# Patient Record
Sex: FEMALE | Race: WHITE | NOT HISPANIC OR LATINO | Employment: OTHER | URBAN - METROPOLITAN AREA
[De-identification: names, ages, dates, MRNs, and addresses within clinical notes are randomized per-mention and may not be internally consistent; named-entity substitution may affect disease eponyms.]

---

## 2017-01-15 ENCOUNTER — HOSPITAL ENCOUNTER (INPATIENT)
Facility: HOSPITAL | Age: 52
LOS: 11 days | Discharge: OTHER INSTITUTION | DRG: 442 | End: 2017-01-26
Attending: EMERGENCY MEDICINE | Admitting: HOSPITALIST
Payer: MEDICARE

## 2017-01-15 DIAGNOSIS — R56.9 SEIZURE IN RESPONSE TO ACUTE EVENT: ICD-10-CM

## 2017-01-15 DIAGNOSIS — F31.70 BIPOLAR DISORDER IN REMISSION: ICD-10-CM

## 2017-01-15 DIAGNOSIS — F10.21 ALCOHOL DEPENDENCE IN REMISSION: ICD-10-CM

## 2017-01-15 DIAGNOSIS — K92.0 HEMATEMESIS: ICD-10-CM

## 2017-01-15 DIAGNOSIS — E46 MALNUTRITION: ICD-10-CM

## 2017-01-15 DIAGNOSIS — F13.932: ICD-10-CM

## 2017-01-15 DIAGNOSIS — K76.82 HEPATIC ENCEPHALOPATHY: Primary | ICD-10-CM

## 2017-01-15 DIAGNOSIS — F10.90 ALCOHOL USE DISORDER: ICD-10-CM

## 2017-01-15 LAB
ALBUMIN SERPL BCP-MCNC: 3.9 G/DL
ALP SERPL-CCNC: 115 U/L
ALT SERPL W/O P-5'-P-CCNC: 40 U/L
AMMONIA PLAS-SCNC: 50 UMOL/L
AMPHET+METHAMPHET UR QL: NEGATIVE
ANION GAP SERPL CALC-SCNC: 8 MMOL/L
AST SERPL-CCNC: 64 U/L
BARBITURATES UR QL SCN>200 NG/ML: NEGATIVE
BASOPHILS # BLD AUTO: 0.04 K/UL
BASOPHILS NFR BLD: 0.3 %
BENZODIAZ UR QL SCN>200 NG/ML: NORMAL
BILIRUB SERPL-MCNC: 14.1 MG/DL
BILIRUB UR QL STRIP: ABNORMAL
BUN SERPL-MCNC: 16 MG/DL
BZE UR QL SCN: NEGATIVE
CALCIUM SERPL-MCNC: 9.9 MG/DL
CANNABINOIDS UR QL SCN: NEGATIVE
CHLORIDE SERPL-SCNC: 107 MMOL/L
CLARITY UR REFRACT.AUTO: CLEAR
CO2 SERPL-SCNC: 24 MMOL/L
COLOR UR AUTO: ABNORMAL
CREAT SERPL-MCNC: 0.7 MG/DL
CREAT UR-MCNC: 192 MG/DL
DIFFERENTIAL METHOD: ABNORMAL
EOSINOPHIL # BLD AUTO: 0.2 K/UL
EOSINOPHIL NFR BLD: 1.2 %
ERYTHROCYTE [DISTWIDTH] IN BLOOD BY AUTOMATED COUNT: 16 %
EST. GFR  (AFRICAN AMERICAN): >60 ML/MIN/1.73 M^2
EST. GFR  (NON AFRICAN AMERICAN): >60 ML/MIN/1.73 M^2
ETHANOL SERPL-MCNC: <10 MG/DL
ETHANOL UR-MCNC: <10 MG/DL
GLUCOSE SERPL-MCNC: 107 MG/DL
GLUCOSE UR QL STRIP: NEGATIVE
HCT VFR BLD AUTO: 31.4 %
HGB BLD-MCNC: 10.7 G/DL
HGB UR QL STRIP: NEGATIVE
INR PPP: 1.8
KETONES UR QL STRIP: ABNORMAL
LACTATE SERPL-SCNC: 2.3 MMOL/L
LEUKOCYTE ESTERASE UR QL STRIP: NEGATIVE
LITHIUM SERPL-SCNC: 0.7 MMOL/L
LYMPHOCYTES # BLD AUTO: 1.5 K/UL
LYMPHOCYTES NFR BLD: 12.5 %
MCH RBC QN AUTO: 32 PG
MCHC RBC AUTO-ENTMCNC: 34.1 %
MCV RBC AUTO: 94 FL
METHADONE UR QL SCN>300 NG/ML: NEGATIVE
MONOCYTES # BLD AUTO: 0.7 K/UL
MONOCYTES NFR BLD: 5.8 %
NEUTROPHILS # BLD AUTO: 9.8 K/UL
NEUTROPHILS NFR BLD: 79.6 %
NITRITE UR QL STRIP: NEGATIVE
OPIATES UR QL SCN: NEGATIVE
PCP UR QL SCN>25 NG/ML: NEGATIVE
PH UR STRIP: 6 [PH] (ref 5–8)
PLATELET # BLD AUTO: 70 K/UL
PMV BLD AUTO: 9.9 FL
POTASSIUM SERPL-SCNC: 3.6 MMOL/L
PROT SERPL-MCNC: 7.9 G/DL
PROT UR QL STRIP: ABNORMAL
PROTHROMBIN TIME: 17.7 SEC
RBC # BLD AUTO: 3.34 M/UL
SODIUM SERPL-SCNC: 139 MMOL/L
SP GR UR STRIP: 1.02 (ref 1–1.03)
TOXICOLOGY INFORMATION: NORMAL
URN SPEC COLLECT METH UR: ABNORMAL
UROBILINOGEN UR STRIP-ACNC: 2 EU/DL
WBC # BLD AUTO: 12.34 K/UL

## 2017-01-15 PROCEDURE — 82140 ASSAY OF AMMONIA: CPT

## 2017-01-15 PROCEDURE — 80178 ASSAY OF LITHIUM: CPT

## 2017-01-15 PROCEDURE — 25000003 PHARM REV CODE 250: Performed by: STUDENT IN AN ORGANIZED HEALTH CARE EDUCATION/TRAINING PROGRAM

## 2017-01-15 PROCEDURE — 85610 PROTHROMBIN TIME: CPT

## 2017-01-15 PROCEDURE — 93010 ELECTROCARDIOGRAM REPORT: CPT | Mod: ,,, | Performed by: INTERNAL MEDICINE

## 2017-01-15 PROCEDURE — 63600175 PHARM REV CODE 636 W HCPCS: Performed by: STUDENT IN AN ORGANIZED HEALTH CARE EDUCATION/TRAINING PROGRAM

## 2017-01-15 PROCEDURE — 80307 DRUG TEST PRSMV CHEM ANLYZR: CPT

## 2017-01-15 PROCEDURE — 11000001 HC ACUTE MED/SURG PRIVATE ROOM

## 2017-01-15 PROCEDURE — 96360 HYDRATION IV INFUSION INIT: CPT

## 2017-01-15 PROCEDURE — 85025 COMPLETE CBC W/AUTO DIFF WBC: CPT

## 2017-01-15 PROCEDURE — 99285 EMERGENCY DEPT VISIT HI MDM: CPT | Mod: ,,, | Performed by: EMERGENCY MEDICINE

## 2017-01-15 PROCEDURE — 25000003 PHARM REV CODE 250: Performed by: EMERGENCY MEDICINE

## 2017-01-15 PROCEDURE — 83605 ASSAY OF LACTIC ACID: CPT

## 2017-01-15 PROCEDURE — 96361 HYDRATE IV INFUSION ADD-ON: CPT

## 2017-01-15 PROCEDURE — 80053 COMPREHEN METABOLIC PANEL: CPT

## 2017-01-15 PROCEDURE — 80184 ASSAY OF PHENOBARBITAL: CPT

## 2017-01-15 PROCEDURE — 81003 URINALYSIS AUTO W/O SCOPE: CPT

## 2017-01-15 PROCEDURE — 99285 EMERGENCY DEPT VISIT HI MDM: CPT | Mod: 25

## 2017-01-15 PROCEDURE — 93005 ELECTROCARDIOGRAM TRACING: CPT

## 2017-01-15 PROCEDURE — 87086 URINE CULTURE/COLONY COUNT: CPT

## 2017-01-15 PROCEDURE — 87040 BLOOD CULTURE FOR BACTERIA: CPT

## 2017-01-15 PROCEDURE — 80320 DRUG SCREEN QUANTALCOHOLS: CPT

## 2017-01-15 PROCEDURE — 99223 1ST HOSP IP/OBS HIGH 75: CPT | Mod: ,,, | Performed by: HOSPITALIST

## 2017-01-15 RX ORDER — IBUPROFEN 200 MG
24 TABLET ORAL
Status: DISCONTINUED | OUTPATIENT
Start: 2017-01-15 | End: 2017-01-26 | Stop reason: HOSPADM

## 2017-01-15 RX ORDER — ONDANSETRON 8 MG/1
8 TABLET, ORALLY DISINTEGRATING ORAL EVERY 8 HOURS PRN
Status: DISCONTINUED | OUTPATIENT
Start: 2017-01-15 | End: 2017-01-21

## 2017-01-15 RX ORDER — PANTOPRAZOLE SODIUM 40 MG/1
40 TABLET, DELAYED RELEASE ORAL DAILY
Status: DISCONTINUED | OUTPATIENT
Start: 2017-01-16 | End: 2017-01-21

## 2017-01-15 RX ORDER — IBUPROFEN 200 MG
16 TABLET ORAL
Status: DISCONTINUED | OUTPATIENT
Start: 2017-01-15 | End: 2017-01-26 | Stop reason: HOSPADM

## 2017-01-15 RX ORDER — FOLIC ACID 1 MG/1
1000 TABLET ORAL DAILY
Status: DISCONTINUED | OUTPATIENT
Start: 2017-01-16 | End: 2017-01-26 | Stop reason: HOSPADM

## 2017-01-15 RX ORDER — LEVETIRACETAM 500 MG/1
500 TABLET ORAL 2 TIMES DAILY
Status: DISCONTINUED | OUTPATIENT
Start: 2017-01-15 | End: 2017-01-17

## 2017-01-15 RX ORDER — FUROSEMIDE 20 MG/1
20 TABLET ORAL DAILY
Status: DISCONTINUED | OUTPATIENT
Start: 2017-01-16 | End: 2017-01-15

## 2017-01-15 RX ORDER — GLUCAGON 1 MG
1 KIT INJECTION
Status: DISCONTINUED | OUTPATIENT
Start: 2017-01-15 | End: 2017-01-26 | Stop reason: HOSPADM

## 2017-01-15 RX ORDER — SPIRONOLACTONE 25 MG/1
50 TABLET ORAL DAILY
Status: DISCONTINUED | OUTPATIENT
Start: 2017-01-16 | End: 2017-01-15

## 2017-01-15 RX ORDER — ENOXAPARIN SODIUM 100 MG/ML
30 INJECTION SUBCUTANEOUS EVERY 24 HOURS
Status: DISCONTINUED | OUTPATIENT
Start: 2017-01-16 | End: 2017-01-16

## 2017-01-15 RX ORDER — ENOXAPARIN SODIUM 100 MG/ML
40 INJECTION SUBCUTANEOUS EVERY 24 HOURS
Status: DISCONTINUED | OUTPATIENT
Start: 2017-01-16 | End: 2017-01-15

## 2017-01-15 RX ORDER — OLANZAPINE 10 MG/2ML
2.5 INJECTION, POWDER, FOR SOLUTION INTRAMUSCULAR ONCE AS NEEDED
Status: DISPENSED | OUTPATIENT
Start: 2017-01-15 | End: 2017-01-15

## 2017-01-15 RX ORDER — LACTULOSE 10 G/15ML
20 SOLUTION ORAL 3 TIMES DAILY
Status: DISCONTINUED | OUTPATIENT
Start: 2017-01-15 | End: 2017-01-16

## 2017-01-15 RX ORDER — LITHIUM CARBONATE 300 MG/1
300 CAPSULE ORAL DAILY
Status: DISCONTINUED | OUTPATIENT
Start: 2017-01-16 | End: 2017-01-20

## 2017-01-15 RX ORDER — LACTULOSE 10 G/15ML
20 SOLUTION ORAL ONCE
Status: COMPLETED | OUTPATIENT
Start: 2017-01-15 | End: 2017-01-15

## 2017-01-15 RX ADMIN — LEVETIRACETAM 500 MG: 500 TABLET, FILM COATED ORAL at 10:01

## 2017-01-15 RX ADMIN — CEFTRIAXONE 1 G: 1 INJECTION, SOLUTION INTRAVENOUS at 11:01

## 2017-01-15 RX ADMIN — LACTULOSE 20 G: 10 SOLUTION ORAL at 02:01

## 2017-01-15 RX ADMIN — SODIUM CHLORIDE 1000 ML: 0.9 INJECTION, SOLUTION INTRAVENOUS at 02:01

## 2017-01-15 RX ADMIN — LACTULOSE 20 G: 10 SOLUTION ORAL at 10:01

## 2017-01-15 NOTE — ED NOTES
Patient identifiers verified and correct for Marely Cardosoelle     C/C: AMS  APPEARANCE: awake.  SKIN: warm, dry and intact. No breakdown or bruising. Jaundice noted.  MUSCULOSKELETAL: Patient moving all extremities spontaneously, no obvious swelling or deformities noted. Ambulates with assistance.   RESPIRATORY: no shortness of breath. Productive cough noted.   CARDIAC: heart tones normal. Regular rate and rhythm; 2+ distal pulses; no peripheral edema  ABDOMEN: S/ND/NT, normoactive bowel sounds present in all four quadrants. Normal stool pattern.  : voids spontaneously without difficulty.  Neurologic: AAO x 4; delayed responses. follows commands equal strength in all extremities; denies numbness/tingling.

## 2017-01-15 NOTE — ED PROVIDER NOTES
Encounter Date: 1/15/2017       History     Chief Complaint   Patient presents with    Altered Mental Status     since last sunday; patient jaundiced; hx liver problems     Review of patient's allergies indicates:   Allergen Reactions    Sulfa (sulfonamide antibiotics) Rash     HPI Comments: 51 y.o. F w end stage liver disease, bipolar disorder, presenting for AMS for 1 week. The patient was previously being evaluated for liver transplant here but after seeing a [facility] doctor who told her that transplant was not needed, she was lost to f/u at [facility]. This is according to [facility] chart review. Patient's mother states that she was rejected for transplant and followed up at [facility] after this. The patient is alert and A&Ox3 but very slow to respond to questions. Sometimes does not respond at all. She denies having specific complaints. She is not eating much (per mom) but has baseline poor PO intake and was noted to be cachectic in prior notes. No fevers or vomiting. No abdominal pain. Patient states that she does take lactulose but does not take it every day. She takes lithium for bipolar according to her mom.      Past Medical History   Diagnosis Date    Alcohol abuse, in remission 6/15/2015     14.5 weeks ago; AA weekly    Anemia     Anxiety 6/15/2015    Behavioral problem     Bipolar disorder     Bipolar disorder in remission 6/15/2015    Cirrhosis, Laennec's 6/15/2015    Depression     Encounter for blood transfusion     Epistaxis 6/15/2015    Fatigue     Glaucoma     Hematuria     Hepatic encephalopathy 6/15/2015    Hepatic enlargement     History of psychiatric care     History of psychiatric hospitalization     History of seizure 6/15/2015     1    hx of intentional Tylenol overdose 6/15/2015     2005- situational and hx of bipolar    Jeana     Osteoarthritis     Other ascites 6/15/2015    Psychiatric problem     Renal disorder     Seizures     Self-harming behavior     Suicide  attempt     Therapy     Thrombocytopenia 6/15/2015     Past Medical History Pertinent Negatives   Diagnosis Date Noted    ADHD (attention deficit hyperactivity disorder) 11/26/2014    CHF (congestive heart failure) 11/26/2014    COPD (chronic obstructive pulmonary disease) 11/26/2014    CVA (cerebral vascular accident) 11/26/2014    Dementia 11/26/2014    HIV infection 11/26/2014    Neuropathy 11/26/2014    Obsessive-compulsive disorder 11/26/2014    Oppositional defiant disorder 11/26/2014    Psychosis 11/26/2014    PTSD (post-traumatic stress disorder) 11/26/2014    Renal dialysis status(V45.11) 11/26/2014    Schizoaffective disorder 11/26/2014    Thyroid disease 11/26/2014     Past Surgical History   Procedure Laterality Date    Esophagogastroduodenoscopy      Cosmetic surgery       Family History   Problem Relation Age of Onset    Alcohol abuse Father     Suicide Father     Bipolar disorder Father     Alcohol abuse Sister     Hypertension Mother     Alcohol abuse Paternal Grandfather     Drug abuse Neg Hx     Dementia Neg Hx      Social History   Substance Use Topics    Smoking status: Never Smoker    Smokeless tobacco: None    Alcohol use No      Comment: pt denies at this time     Review of Systems   Unable to perform ROS: Mental status change       Physical Exam   Initial Vitals   BP Pulse Resp Temp SpO2   01/15/17 1153 01/15/17 1153 01/15/17 1153 01/15/17 1153 01/15/17 1153   187/82 100 15 98.7 °F (37.1 °C) 95 %     Physical Exam    Constitutional: She appears well-developed and well-nourished. She is not diaphoretic. No distress.   HENT:   Head: Normocephalic and atraumatic.   Eyes: Conjunctivae and EOM are normal. Pupils are equal, round, and reactive to light. Right eye exhibits no discharge. Left eye exhibits no discharge. Scleral icterus is present.   Neck: Normal range of motion. Neck supple.   Cardiovascular: Normal rate and normal heart sounds. Exam reveals no friction  rub.    No murmur heard.  Pulmonary/Chest: Breath sounds normal. No respiratory distress. She has no wheezes. She has no rales.   Abdominal: Soft. There is hepatosplenomegaly. There is no tenderness. There is no rebound and no guarding.   Musculoskeletal: She exhibits no edema or tenderness.   Lymphadenopathy:     She has no cervical adenopathy.   Neurological: She is alert and oriented to person, place, and time.   Skin: Skin is warm and dry. No erythema. No pallor.   jaundice         ED Course   Procedures  Labs Reviewed   CBC W/ AUTO DIFFERENTIAL   COMPREHENSIVE METABOLIC PANEL   AMMONIA   URINALYSIS   LITHIUM LEVEL                   APC / Resident Notes:   51 y.o. F w ESLD presenting w AMS. Differential is broad including hepatic encephalopathy (most likely), occult infection, renal failure, dehydration, intracranial pathology, ACS, dysrhythmia, urosepsis.  Patient found to have ammonia just at upper limit of normal. Bilirubin has continued to rise. RUQ ultrasound shows GB with thickened wall but likely chronic in etiology. She has been given 1 dose of PO lactulose and will be admitted to medicine for further w/u and treatment of her AMS.    Shea Meyer MD           Attending Attestation:   Physician Attestation Statement for Resident:  As the supervising MD . -: Patient with history of liver difficulties patient wife is concerned because the patient continues to be more lethargic despite taking lactulose patient is not on the liver transplant list  -: Will answer some questions but is quite lethargic no recent trauma with no contusions of head  -: Will do labs including ammonia level concern for infection GI bleed            Attending ED Notes:   Patient with history of hepatic complications presenting now his wife because of increased lethargy and confusion this is despite taking his lactulose patient evaluated in the ED was gi ammonia level patient required admission to the hospital because of continued  confusion          ED Course     Clinical Impression:   The encounter diagnosis was Hepatic encephalopathy.    Disposition:   Disposition: Admitted  Condition: Serious       Michelle Lucio MD  Resident  01/15/17 2046       Brady Deras MD  01/24/17 5825

## 2017-01-15 NOTE — ED TRIAGE NOTES
Pt arrived to the ED with CC of AMS. Pts mother states she noticed a decline in her daughters mental status a week ago. pts mother states she helps her with her medications but does not know if her daughter has been taking her lactulose.

## 2017-01-16 LAB
ALBUMIN SERPL BCP-MCNC: 3.3 G/DL
ALP SERPL-CCNC: 111 U/L
ALT SERPL W/O P-5'-P-CCNC: 34 U/L
ANION GAP SERPL CALC-SCNC: 6 MMOL/L
AST SERPL-CCNC: 50 U/L
BACTERIA UR CULT: NO GROWTH
BASOPHILS # BLD AUTO: 0.03 K/UL
BASOPHILS NFR BLD: 0.4 %
BILIRUB SERPL-MCNC: 11.7 MG/DL
BUN SERPL-MCNC: 12 MG/DL
CALCIUM SERPL-MCNC: 8.9 MG/DL
CHLORIDE SERPL-SCNC: 111 MMOL/L
CO2 SERPL-SCNC: 23 MMOL/L
CREAT SERPL-MCNC: 0.6 MG/DL
DIFFERENTIAL METHOD: ABNORMAL
EOSINOPHIL # BLD AUTO: 0.2 K/UL
EOSINOPHIL NFR BLD: 2.6 %
ERYTHROCYTE [DISTWIDTH] IN BLOOD BY AUTOMATED COUNT: 15.9 %
EST. GFR  (AFRICAN AMERICAN): >60 ML/MIN/1.73 M^2
EST. GFR  (NON AFRICAN AMERICAN): >60 ML/MIN/1.73 M^2
GLUCOSE SERPL-MCNC: 93 MG/DL
HAV IGM SERPL QL IA: NEGATIVE
HBV CORE IGM SERPL QL IA: NEGATIVE
HBV SURFACE AG SERPL QL IA: NEGATIVE
HCT VFR BLD AUTO: 28.9 %
HCV AB SERPL QL IA: NEGATIVE
HGB BLD-MCNC: 10 G/DL
HIV 1+2 AB+HIV1 P24 AG SERPL QL IA: NEGATIVE
INR PPP: 1.8
LYMPHOCYTES # BLD AUTO: 1.3 K/UL
LYMPHOCYTES NFR BLD: 16.1 %
MAGNESIUM SERPL-MCNC: 1.7 MG/DL
MCH RBC QN AUTO: 33.1 PG
MCHC RBC AUTO-ENTMCNC: 34.6 %
MCV RBC AUTO: 96 FL
MONOCYTES # BLD AUTO: 0.7 K/UL
MONOCYTES NFR BLD: 8 %
NEUTROPHILS # BLD AUTO: 5.9 K/UL
NEUTROPHILS NFR BLD: 72.5 %
PHENOBARB SERPL-MCNC: <2 UG/DL
PLATELET # BLD AUTO: 58 K/UL
PMV BLD AUTO: 8.9 FL
POTASSIUM SERPL-SCNC: 3.5 MMOL/L
PROT SERPL-MCNC: 6.8 G/DL
PROTHROMBIN TIME: 17.7 SEC
RBC # BLD AUTO: 3.02 M/UL
SODIUM SERPL-SCNC: 140 MMOL/L
WBC # BLD AUTO: 8.16 K/UL

## 2017-01-16 PROCEDURE — 85025 COMPLETE CBC W/AUTO DIFF WBC: CPT

## 2017-01-16 PROCEDURE — 80053 COMPREHEN METABOLIC PANEL: CPT

## 2017-01-16 PROCEDURE — 99232 SBSQ HOSP IP/OBS MODERATE 35: CPT | Mod: GC,,, | Performed by: INTERNAL MEDICINE

## 2017-01-16 PROCEDURE — 63600175 PHARM REV CODE 636 W HCPCS: Performed by: STUDENT IN AN ORGANIZED HEALTH CARE EDUCATION/TRAINING PROGRAM

## 2017-01-16 PROCEDURE — 92610 EVALUATE SWALLOWING FUNCTION: CPT

## 2017-01-16 PROCEDURE — 80321 ALCOHOLS BIOMARKERS 1OR 2: CPT

## 2017-01-16 PROCEDURE — 11000001 HC ACUTE MED/SURG PRIVATE ROOM

## 2017-01-16 PROCEDURE — 36415 COLL VENOUS BLD VENIPUNCTURE: CPT

## 2017-01-16 PROCEDURE — 97802 MEDICAL NUTRITION INDIV IN: CPT

## 2017-01-16 PROCEDURE — G8989 SELF CARE D/C STATUS: HCPCS | Mod: CN

## 2017-01-16 PROCEDURE — 97163 PT EVAL HIGH COMPLEX 45 MIN: CPT

## 2017-01-16 PROCEDURE — 25000003 PHARM REV CODE 250: Performed by: STUDENT IN AN ORGANIZED HEALTH CARE EDUCATION/TRAINING PROGRAM

## 2017-01-16 PROCEDURE — 85610 PROTHROMBIN TIME: CPT

## 2017-01-16 PROCEDURE — 86703 HIV-1/HIV-2 1 RESULT ANTBDY: CPT

## 2017-01-16 PROCEDURE — G8987 SELF CARE CURRENT STATUS: HCPCS | Mod: CN

## 2017-01-16 PROCEDURE — 97167 OT EVAL HIGH COMPLEX 60 MIN: CPT

## 2017-01-16 PROCEDURE — 83735 ASSAY OF MAGNESIUM: CPT

## 2017-01-16 PROCEDURE — 80074 ACUTE HEPATITIS PANEL: CPT

## 2017-01-16 PROCEDURE — G8988 SELF CARE GOAL STATUS: HCPCS | Mod: CN

## 2017-01-16 RX ORDER — LACTULOSE 10 G/15ML
20 SOLUTION ORAL
Status: DISCONTINUED | OUTPATIENT
Start: 2017-01-16 | End: 2017-01-21

## 2017-01-16 RX ORDER — POTASSIUM CHLORIDE 750 MG/1
30 CAPSULE, EXTENDED RELEASE ORAL ONCE
Status: COMPLETED | OUTPATIENT
Start: 2017-01-16 | End: 2017-01-16

## 2017-01-16 RX ORDER — CLOTRIMAZOLE 10 MG/1
10 LOZENGE ORAL; TOPICAL 3 TIMES DAILY
Status: DISCONTINUED | OUTPATIENT
Start: 2017-01-16 | End: 2017-01-23

## 2017-01-16 RX ADMIN — LEVETIRACETAM 500 MG: 500 TABLET, FILM COATED ORAL at 08:01

## 2017-01-16 RX ADMIN — POTASSIUM CHLORIDE 30 MEQ: 750 CAPSULE, EXTENDED RELEASE ORAL at 10:01

## 2017-01-16 RX ADMIN — LACTULOSE 20 G: 10 SOLUTION ORAL at 05:01

## 2017-01-16 RX ADMIN — FOLIC ACID 1000 MCG: 1 TABLET ORAL at 10:01

## 2017-01-16 RX ADMIN — LEVETIRACETAM 500 MG: 500 TABLET, FILM COATED ORAL at 10:01

## 2017-01-16 RX ADMIN — CEFTRIAXONE 1 G: 1 INJECTION, SOLUTION INTRAVENOUS at 10:01

## 2017-01-16 RX ADMIN — LITHIUM CARBONATE 300 MG: 300 CAPSULE, GELATIN COATED ORAL at 10:01

## 2017-01-16 RX ADMIN — LACTULOSE 20 G: 10 SOLUTION ORAL at 08:01

## 2017-01-16 RX ADMIN — LACTULOSE 20 G: 10 SOLUTION ORAL at 06:01

## 2017-01-16 RX ADMIN — PANTOPRAZOLE SODIUM 40 MG: 40 TABLET, DELAYED RELEASE ORAL at 10:01

## 2017-01-16 RX ADMIN — RIFAXIMIN 550 MG: 550 TABLET ORAL at 08:01

## 2017-01-16 NOTE — PROGRESS NOTES
Pt speaking Bhutanese in response to questions to posed to her    Pt speaks minimally and responds predominately c/ Y or N in response to questions.    MD in room assessing pt.

## 2017-01-16 NOTE — PT/OT/SLP EVAL
Speech Language Pathology  Evaluation    Marely Hamilton   MRN: [id number]   Admitting Diagnosis: Hepatic encephalopathy    Diet recommendations: Solid Diet Level: Regular  Liquid Diet Level: Thin Feed only when awake/alert, HOB to 90 degrees, Small bites/sips, Alternating bites/sips, 1 bite/sip at a time and Check for pocketing/oral residue    SLP Treatment Date: 17  Speech Start Time: 1028     Speech Stop Time: 1036     Speech Total (min): 8 min       TREATMENT BILLABLE MINUTES:  Eval Swallow and Oral Function 8    Diagnosis: Hepatic encephalopathy      Past Medical History   Diagnosis Date    Alcohol abuse, in remission 6/15/2015     14.5 weeks ago; AA weekly    Anemia     Anxiety 6/15/2015    Behavioral problem     Bipolar disorder     Bipolar disorder in remission 6/15/2015    Cirrhosis, Laennec's 6/15/2015    Depression     Encounter for blood transfusion     Epistaxis 6/15/2015    Fatigue     Glaucoma     Hematuria     Hepatic encephalopathy 6/15/2015    Hepatic enlargement     History of psychiatric care     History of psychiatric hospitalization     History of seizure 6/15/2015     1    hx of intentional Tylenol overdose 6/15/2015     2005- situational and hx of bipolar    Jeana     Osteoarthritis     Other ascites 6/15/2015    Psychiatric problem     Renal disorder     Seizures     Self-harming behavior     Suicide attempt     Therapy     Thrombocytopenia 6/15/2015     Past Surgical History   Procedure Laterality Date    Esophagogastroduodenoscopy      Cosmetic surgery         Has the patient been evaluated by SLP for swallowing? : Yes  Keep patient NPO?: No   General Precautions: Standard, aspiration, fall         Prior diet: Regular/thin.    Subjective:  Awake/alert, confusion noted.    Pain Ratin/10              Pain Rating Post-Intervention: 0/10    Objective:    Pt reluctant to accept trials throughout tx, requiring min cues.     Oral Musculature Evaluation  Oral  Musculature: WFL  Dentition: present and adequate  Mandibular Strength and Mobility: WFL  Oral Labial Strength and Mobility: WFL  Lingual Strength and Mobility: WFL  Voice Prior to PO Intake: clear     Bedside Swallow Eval:  Consistencies Assessed: Thin liquids x7 cup/straw, Puree x2 and Solids x1  Oral Phase: WFL, oral residue noted post medication whole, pt would benefit from liquid/puree wash  Pharyngeal Phase: no overt clinical  signs/symptoms of aspiration and no overt clinical signs/symptoms of pharyngeal dysphagia      Assessment:  Marely Hamilton is a 51 y.o. female with a medical diagnosis of Hepatic encephalopathy and presents with Dysphaia.        Goals:   SLP Goals        Problem: SLP Goal    Goal Priority Disciplines Outcome   SLP Goal     SLP    Description:  Speech Language Pathology Goals  Goals expected to be met by 1/23    1. Pt will tolerate regular diet/thin liquids without overt s/s of aspiration                   Plan:   Patient to be seen Therapy Frequency: 5 x/week   Plan of Care expires: 02/14/17  Plan of Care reviewed with: patient                 Mayra Olivier CCC-SLP   Speech Language Pathologist  Pager [phone]  01/16/2017

## 2017-01-16 NOTE — ASSESSMENT & PLAN NOTE
- Cirrhosis secondary to alcohol abuse  - Review principal problem for more elaboration   - Will place her Ceftriaxone 1 gm BID.  - No fluid to be tapped in her ascitic fluid  - Will consult hepatology

## 2017-01-16 NOTE — ED NOTES
Patient maintained on continuous cardiac monitor, automatic blood pressure cuff and continuous pulse oximeter.

## 2017-01-16 NOTE — PT/OT/SLP EVAL
Physical Therapy  Evaluation    Marely Hamilton   MRN: [id number]   Admitting Diagnosis: Hepatic encephalopathy    PT Received On: 01/16/17  PT Start Time: 0748     PT Stop Time: 0759    PT Total Time (min): 11 min       Billable Minutes:  Evaluation 11    Diagnosis: Hepatic encephalopathy    Past Medical History   Diagnosis Date    Alcohol abuse, in remission 6/15/2015     14.5 weeks ago; AA weekly    Anemia     Anxiety 6/15/2015    Behavioral problem     Bipolar disorder     Bipolar disorder in remission 6/15/2015    Cirrhosis, Laennec's 6/15/2015    Depression     Encounter for blood transfusion     Epistaxis 6/15/2015    Fatigue     Glaucoma     Hematuria     Hepatic encephalopathy 6/15/2015    Hepatic enlargement     History of psychiatric care     History of psychiatric hospitalization     History of seizure 6/15/2015     1    hx of intentional Tylenol overdose 6/15/2015     2005- situational and hx of bipolar    Jeana     Osteoarthritis     Other ascites 6/15/2015    Psychiatric problem     Renal disorder     Seizures     Self-harming behavior     Suicide attempt     Therapy     Thrombocytopenia 6/15/2015      Past Surgical History   Procedure Laterality Date    Esophagogastroduodenoscopy      Cosmetic surgery         Referring physician: BEATRICE Bowen  Date referred to PT: 1/15/2017    General Precautions: Standard, fall, aspiration  Orthopedic Precautions: N/A   Braces: N/A            Patient History:  Lives With: parent(s)  Living Environment Comment: Pt lives with mother per chart review. Information limited 2* poor cognition and communication of the pt and no family present.  DME owned (not currently used): unkown 2* pt limited cognition and no family present    Previous Level of Function:  Ambulation Skills:  (unable to obtain information)  Transfer Skills:  (unable to obtain information)  ADL Skills:  (unable to obtain information)  Work/Leisure Activity:  (unable to obtain  "information)    Subjective:  Communicated with RN prior to session.  Pt laying in bed with tele. Pt nonverbal to questions, but nodded "yes" when asked if she wanted to get up.  Chief Complaint: unable to state 2* pt limited communication  Patient goals: unable to state due to limited communication    Pain Rating:  (Pt nodded "yes" when asked if she was in pain, but unable to quantify or indicate location.)     Objective:   Patient found with: telemetry     Cognitive Exam:  Oriented to: Pt unable to communicate (verbally or nonverbally) knowledge of person, place, situation, or time.    Follows Commands/attention: Inattentive, pt able to follow commands 25% of the time for single step  Communication: Pt able to verbally say "yes" and "no" and nod head "yes" and "no". However, the responses are inconsistent and not accurate to the questions asked.  Safety awareness/insight to disability: impaired    Physical Exam:  Postural examination/scapula alignment: Head forward    Skin integrity: Visible skin intact  Edema: None noted BLE    Sensation:   Pt unable to indicate sensation.    Lower Extremity Range of Motion:  Right Lower Extremity: WFL  Left Lower Extremity: WFL    Lower Extremity Strength:  Right Lower Extremity: unable to formally assess 2* to poor command follow  Left Lower Extremity: unable to formally assess 2* to poor command follow     Gross motor coordination: unable to assess 2* to poor command follow    Functional Mobility:  Bed Mobility:  Supine to Sit: Dependent  Sit to Supine: Dependent    Transfers:  Sit <> Stand Assistance:  (unable to perform)    Gait:   Gait Distance: unable to perform    Stairs:  Not assessed at this time.    Balance:   Static Sit: 0: Needs MAXIMAL assist to maintain sitting without back support  Dynamic Sit: 0: N/A  Static Stand: 0: Needs MAXIMAL assist to maintain   Dynamic stand: 0: N/A    Therapeutic Activities and Exercises:  Pt nonverbal, except for an occasional "yes" and " ""no", however the responses were inconsistent and inappropriate for the questions.  Pt also has limited eye contact.  Pt dependent for bed mobility and to sit at EOB.  Pt had no equilibrium reaction when sitting at EOB.    AM-PAC 6 CLICK MOBILITY  How much help from another person does this patient currently need?   1 = Unable, Total/Dependent Assistance  2 = A lot, Maximum/Moderate Assistance  3 = A little, Minimum/Contact Guard/Supervision  4 = None, Modified [place]/Independent    Turning over in bed (including adjusting bedclothes, sheets and blankets)?: 1  Sitting down on and standing up from a chair with arms (e.g., wheelchair, bedside commode, etc.): 1  Moving from lying on back to sitting on the side of the bed?: 1  Moving to and from a bed to a chair (including a wheelchair)?: 1  Need to walk in hospital room?: 1  Climbing 3-5 steps with a railing?: 1  Total Score: 6     AM-PAC Raw Score CMS G-Code Modifier Level of Impairment Assistance   6 % Total / Unable   7 - 9 CM 80 - 100% Maximal Assist   10 - 14 CL 60 - 80% Moderate Assist   15 - 19 CK 40 - 60% Moderate Assist   20 - 22 CJ 20 - 40% Minimal Assist   23 CI 1-20% SBA / CGA   24 CH 0% Independent/ Mod I     Patient left supine with all lines intact, call button in reach, bed alarm on and RN notified.    Assessment:   Marely Hamilton is a 51 y.o. female with a medical diagnosis of Hepatic encephalopathy. Pt with limited verbal and nonverbal communication, and only able to communicate with "yes" and "no", however the responses are inconsistent and inappropriate for the questions asked. Pt able to follow commands 25% of the time for single step requests. Pt dependent for supine to sit, sit to supine, and to sit at EOB. She does not demonstrate equilibrium reactions when sitting at EOB. Pt demonstrated independent leg movements when asked to move her legs, and a gross assessment of leg strength showed that her strength is WFL. Pt to benefit " from skilled physical therapy on a trial basis (pending patient ability to participate) at this time to address these deficits and restore functional mobility.    Rehab identified problem list/impairments: Rehab identified problem list/impairments: weakness, impaired endurance, impaired self care skills, impaired functional mobilty, decreased lower extremity function, decreased upper extremity function, decreased coordination, impaired cognition, impaired coordination    Rehab potential is fair.    Activity tolerance: Fair    Discharge recommendations: Discharge Facility/Level Of Care Needs: other (see comments) (24 hour assist, pending patient progress)     Barriers to discharge: Barriers to Discharge: Other (Comment) (cognition)    Equipment recommendations: Equipment Needed After Discharge:  (TBD)     GOALS:   Physical Therapy Goals        Problem: Physical Therapy Goal    Goal Priority Disciplines Outcome Goal Variances Interventions   Physical Therapy Goal     PT/OT, PT      Physical Therapy Goal     PT Ongoing (interventions implemented as appropriate)     Description:  Goals to be met by: 2017     Patient will increase functional independence with mobility by performin. Supine to sit with Moderate Assistance  2. Sit to supine with Moderate Assistance  3. Sit to stand transfer with Maximum Assistance  4. Gait  x 10 feet with Maximum Assistance with or without appropriate AD.                 PLAN:    Patient to be seen 4 x/week to address the above listed problems via therapeutic exercises, therapeutic activities, gait training  Plan of Care expires:  2017  Plan of Care reviewed with: patient (pt unable to communicate understanding)          DILSHAD Curiel  2017

## 2017-01-16 NOTE — MEDICAL/APP STUDENT
Progress Note  [facility]  Hepatology    01/16/2017  REASON FOR CONSULT  Hepatic Encephalopathy    HPI  Ms. Hamilton, a 50 yo female with a PMHx significant of EtOH liver cirrhosis (rejected for transplant by [facility] and lost to follow-up with [facility]) and bipolar disorder was brought to the ER by her mother. For the last week her mother had a notice a change in mental status and a change in her skin color. Per notes, her mother states that Ms. Hamilton has not been compliant with her medication especially her lactulose.     Subjective   The patient has been speaking Occitan and minimally responds to questions with a 'yes' or 'no'. Per nursing staff there was no acute events overnight.    ROS  Unable to attain due to mental status.    Objective     Vitals:    01/16/17 1151   BP: (!) 142/73   Pulse: 94   Resp: 17   Temp: 98.2 °F (36.8 °C)       Physical Exam   Constitutional:   Patient is NOT orient x3   Eyes: Scleral icterus is present.   Abdominal: Soft. Bowel sounds are normal. She exhibits no distension and no mass. There is hepatosplenomegaly. There is no tenderness. There is no rebound, no guarding and negative Tang's sign.   Neurological: She is alert.   Psychiatric: Cognition and memory are impaired. She is noncommunicative.       LABS    CBC  WBC: 8.16  Hb: 10  Hct: 28.9  Platelets: 58    CMP  Na: 140  K: 3.5  Cl: 111  Bicarb: 23  BUN: 12  Cr: 0.6  Glucose: 93  Total Protein: 6.8  Albumin: 3.3  Total Bili: 11.7  AST: 50  ALT: 34  ALP: 111    PT: 17.7  INR: 1.8  MELD: 22    U/A: unremarkable    Urine Toxicology: negative    Blood Cultures: no growth to date    IMAGING    U/S Liver: small cirrhotic liver, splenomegaly, possible cholelithiasis    ASSESSMENT/PLAN    Ms. Hamilton, a 50 yo female with a PMHx significant of EtOH cirrhosis is consulted for hepatic encephalopathy.    Assessment: medication non-compliance vs EtOH induced vs infection    Recommendations:  -Lactulose: titrate to 3-4 bowel  movements per day, watch and observe  -Rifaximin  550 mg PO BID  -PETH screen  -Daily CMPs  -Continue possible liver transplant discussion/evaluation when patient returns to baseline and patient's mother is available (primary caregiver).

## 2017-01-16 NOTE — ASSESSMENT & PLAN NOTE
- Hospitalized for crystal and psychosis x 2; also has had depression (total length 4) (duration 3 wks and 1.5 weeks with the others)  - On lithium daily and follow with her psychiatric  - Lithium level is normal

## 2017-01-16 NOTE — ASSESSMENT & PLAN NOTE
Recent Labs  Lab 01/15/17  1406   HGB 10.7*   HCT 31.4*   - epoetin eladio (PROCRIT) injection 40,000 Units

## 2017-01-16 NOTE — PLAN OF CARE
Pt very confused.     01/16/17 1201   Discharge Assessment   Assessment Type Discharge Planning Assessment   Confirmed/corrected address and phone number on facesheet? Yes   Assessment information obtained from? Other  (Zaria Hamilton, sister)   Expected Length of Stay (days) 5   Communicated expected length of stay with patient/caregiver no   Prior to hospitilization cognitive status: Alert/Oriented   Prior to hospitalization functional status: Independent   Current cognitive status: Not Oriented to Person;Not Oriented to Place;Not Oriented to Time;Unable to Assess   Current Functional Status: Needs Assistance   Arrived From home or self-care   Lives With parent(s)   Able to Return to Prior Arrangements unable to determine at this time (comments)   Is patient able to care for self after discharge? Unable to determine at this time (comments)   How many people do you have in your home that can help with your care after discharge? 1   Who are your caregiver(s) and their phone number(s)? Anastasia Hamilton, mother, [phone]; Zaria Hamilton, sister, [phone]   Readmission Within The Last 30 Days no previous admission in last 30 days   Patient currently being followed by outpatient case management? No   Patient currently receives home health services? No   Does the patient currently use HME? No   Patient currently receives private duty nursing? N/A   Patient currently receives any other outside agency services? No   Equipment Currently Used at Home none   Do you have any problems affording any of your prescribed medications? No   Is the patient taking medications as prescribed? yes   Do you have any financial concerns preventing you from receiving the healthcare you need? No   Does the patient have transportation to healthcare appointments? Yes   Transportation Available family or friend will provide   On Dialysis? No   Does the patient receive services at the [facility]mad[facility] Clinic? No   Are there any open cases? No    Discharge Plan A Home;Home with family   Discharge Plan B Home;Home with family;Home Health   Patient/Family In Agreement With Plan yes     Viktor Ross MD  [address] / [place] [address]      [facility] - [place] - [address]  [address]  [place] [address]  Phone: [phone] Fax: [phone]      [facility] / Plan: [employer] MEDICARE HMO / Product Type: Capitation /

## 2017-01-16 NOTE — PROGRESS NOTES
"[facility] Hospital Medicine  Progress Note    Patient Name: Marely Hamilton  MRN: [id number]  Patient Class: IP- Inpatient   Admission Date: 1/15/2017  Length of Stay: 1 days  Attending Physician: Monalisa Bowen MD  Primary Care Provider: Viktor Ross MD    [facility] Medicine Team: Networked reference to record PCT  Adin Sawyer MD    Subjective:     Principal Problem:Hepatic encephalopathy    HPI:  This is Ms. Marely Hamilton, 51 year female known to have Liver Cirrhosis and ESLD secondary to alcohol abuse, Bipolar disorder she was brought to ED by her mother after she noticed that she was confused and her skin was discolor for the last couple of days.    When we evaluate the patient at bedside, she was by herself and not accompanied by anyone, patient was not good historian and she couldn't reply the question we were asking. So I called the mother Joy Anastasia [phone] and ask questions about current presentation. So based on mother conversation over the phone she  claimed that she was non complaint with her medication especially Lactulose and she did not keep track of her bowel movement on daily basis. Mother also mention that she was having some change in her basal mentation and when you ask her question and doesn't answer question and taking about her sister and cats and "doesn't make sense". Patient lives with her mother. Her mother noticed change in her face color and eyes color in the last couple of weeks more noticeably in the last couple of days. Mother denies if she observed any fever chills, or symptoms systemic infections and lethargy. No clear when she drinks the last alcohol drink.              Interval History:     Over night, she was stable no agitation or disruptive behavior, resumed her Bipolar medication and negative for alcohol in her urine. Still seems lethargic and delay in her responsive. Increased lactulose to aim for better bowel movement.     Review of Systems "   Constitutional: Positive for fatigue. Negative for activity change, appetite change, chills, diaphoresis and fever.   HENT: Negative for congestion and dental problem.    Eyes: Negative for photophobia, pain, discharge and redness.   Respiratory: Negative for apnea, cough, choking and shortness of breath.    Cardiovascular: Negative for chest pain, palpitations and leg swelling.   Gastrointestinal: Negative for abdominal distention, abdominal pain, anal bleeding and blood in stool.   Genitourinary: Negative for difficulty urinating, dysuria, flank pain and frequency.   Musculoskeletal: Negative for back pain and gait problem.   Skin: Positive for color change and pallor.   Neurological: Negative for dizziness, facial asymmetry and headaches.   Hematological: Does not bruise/bleed easily.     Objective:     Vital Signs (Most Recent):  Temp: 98 °F (36.7 °C) (01/16/17 0329)  Pulse: 90 (01/16/17 0329)  Resp: 15 (01/16/17 0329)  BP: (!) 159/67 (01/16/17 0329)  SpO2: 96 % (01/16/17 0329) Vital Signs (24h Range):  Temp:  [97.6 °F (36.4 °C)-98.7 °F (37.1 °C)] 98 °F (36.7 °C)  Pulse:  [] 90  Resp:  [15-17] 15  SpO2:  [95 %-98 %] 96 %  BP: (149-187)/(66-82) 159/67     Weight: 52.1 kg (114 lb 12.8 oz)  Body mass index is 20.34 kg/(m^2).  No intake or output data in the 24 hours ending 01/16/17 0717   Physical Exam   Constitutional: She appears well-developed. No distress.   HENT:   Sclerae of face and conjunctivae    Eyes: Right eye exhibits no discharge. Left eye exhibits no discharge. Scleral icterus is present.   Neck: Normal range of motion. Neck supple. No JVD present. No thyromegaly present.   Cardiovascular: Normal rate and regular rhythm.  Exam reveals no friction rub.    No murmur heard.  Pulmonary/Chest: Effort normal and breath sounds normal. No respiratory distress. She has no wheezes. She has no rales.   Abdominal: Soft. Bowel sounds are normal. She exhibits no distension. There is no tenderness.    Neurological: No cranial nerve deficit. Coordination normal.   conscious but only oriented to person and place.    Skin: She is not diaphoretic.   Vitals reviewed.    Complete Blood Count Coagulation Profile       Recent Labs  Lab 01/15/17  1406 01/16/17  0438   WBC 12.34 8.16   HGB 10.7* 10.0*   HCT 31.4* 28.9*   PLT 70* 58*   MCV 94 96   RDW 16.0* 15.9*      Recent Labs  Lab 01/15/17  2228 01/16/17  0438   INR 1.8* 1.8*        Basic Metabolic Panel  Liver Function Test and Lipid Profile     Recent Labs  Lab 01/15/17  1406 01/16/17  0438    140   K 3.6 3.5    111*   CO2 24 23   BUN 16 12   CREATININE 0.7 0.6   CALCIUM 9.9 8.9   MG  --  1.7      Recent Labs  Lab 01/15/17  1406 01/16/17  0438   PROT 7.9 6.8   BILITOT 14.1* 11.7*   ALKPHOS 115 111   ALT 40 34   AST 64* 50*       Lab Results   Component Value Date    CHOL 175 06/15/2015    HDL 30 (L) 06/15/2015    LDLCALC 125.4 06/15/2015    TRIG 98 06/15/2015        POCT Glucose HbA1c   No results for input(s): POCTGLUCOSE in the last 168 hours. No results found for: HGBA1C     Cardiac Marker Echocardiography   No results for input(s): TROPONINI, BNP in the last 168 hours. EF   Date Value Ref Range Status   10/29/2015 65 55 - 65         Blood Culture  Urine Culture   Lab Results   Component Value Date    LABBLOO No Growth to date 01/15/2017    LABBLOO No Growth to date 01/15/2017    LABBLOO No growth after 5 days. 09/19/2015    LABBLOO No growth after 5 days. 09/19/2015    Lab Results   Component Value Date    LABURIN  09/18/2015     STAPHYLOCOCCUS EPIDERMIDIS  10,000 - 49,999 cfu/ml      LABURIN ESCHERICHIA COLI  >100,000 cfu/ml   09/05/2015        Medications:  Scheduled Meds:   cefTRIAXone (ROCEPHIN) IVPB  1 g Intravenous Q12H    folic acid  1,000 mcg Oral Daily    lactulose  20 g Oral TID    levetiracetam  500 mg Oral BID    lithium  300 mg Oral Daily    pantoprazole  40 mg Oral Daily     Continuous Infusions:   PRN Meds:dextrose 50%, dextrose  50%, glucagon (human recombinant), glucose, glucose, ondansetron       Assessment/Plan:      * Hepatic encephalopathy  - Non compliance history of her medication  - Ammonia at presentation was 50   - No clear etiology, differential diagnosis include but not limited to Hepatic Encephalopathy, medication overdose, deterioration of her ESLD, metabolic etiologies or infectious process.   - Delirium precaution :    Daytime: awake, up in chair as tolerated, tv on, window shades up, frequent reorientation.   Nighttime: in bed, minimize interruptions, tv off, window shades down.  - Resumed lactulose and aim for bowel movement 3-4 times bowel movements   - Increased lactulose for better bowel movement  - Add Rifaximin to improve her mentation   - Will consult Hepatology for their input    MELD-Na score: 22 at 1/16/2017  4:38 AM  MELD score: 22 at 1/16/2017  4:38 AM  Calculated from:  Serum Creatinine: 0.6 mg/dL (Rounded to 1) at 1/16/2017  4:38 AM  Serum Sodium: 140 mmol/L (Rounded to 137) at 1/16/2017  4:38 AM  Total Bilirubin: 11.7 mg/dL at 1/16/2017  4:38 AM  INR(ratio): 1.8 at 1/16/2017  4:38 AM  Age: 51 years        Cirrhosis, Laennec's  - Cirrhosis secondary to alcohol abuse  - Review principal problem for more elaboration   - Will place her Ceftriaxone 1 gm BID.  - No fluid to be tapped in her ascitic fluid  - Will consult hepatology and waiting their input.     Thrombocytopenia  - Could be secondary to her chronic liver disease   - Will hold DVT PPx given her thrombocytopenia and high INR     Chronic liver failure  - Review principal problem for more elaboration     Bipolar disorder in remission  - Hospitalized for crystal and psychosis x 2; also has had depression (total length 4) (duration 3 wks and 1.5 weeks with the others)  - On lithium daily and follow with her psychiatric  - Lithium level is normal  Resumed Lithium       Anemia    Recent Labs  Lab 01/15/17  1406 01/16/17  0438   HGB 10.7* 10.0*   HCT 31.4* 28.9*    - epoetin eladio (PROCRIT) injection 40,000 Units      Alcohol dependence in remission  - No clear history of alcohol use  - Alcohol level is < 10     Malnutrition  - Will consult dietary for their input.       VTE Risk Mitigation         Ordered     Medium Risk of VTE  Once      01/15/17 1926          Adin Sawyer MD  Department of Hospital Medicine   [facility]

## 2017-01-16 NOTE — PT/OT/SLP EVAL
Occupational Therapy  Evaluation/Discharge Summary    Marely Hamilton   MRN: [id number]   Admitting Diagnosis: Hepatic encephalopathy    OT Date of Treatment: 01/16/17   OT Start Time: 0747  OT Stop Time: 0759  OT Total Time (min): 12 min    Billable Minutes:  Evaluation 12    Diagnosis: Hepatic encephalopathy       Past Medical History   Diagnosis Date    Alcohol abuse, in remission 6/15/2015     14.5 weeks ago; AA weekly    Anemia     Anxiety 6/15/2015    Behavioral problem     Bipolar disorder     Bipolar disorder in remission 6/15/2015    Cirrhosis, Laennec's 6/15/2015    Depression     Encounter for blood transfusion     Epistaxis 6/15/2015    Fatigue     Glaucoma     Hematuria     Hepatic encephalopathy 6/15/2015    Hepatic enlargement     History of psychiatric care     History of psychiatric hospitalization     History of seizure 6/15/2015     1    hx of intentional Tylenol overdose 6/15/2015     2005- situational and hx of bipolar    Jeana     Osteoarthritis     Other ascites 6/15/2015    Psychiatric problem     Renal disorder     Seizures     Self-harming behavior     Suicide attempt     Therapy     Thrombocytopenia 6/15/2015      Past Surgical History   Procedure Laterality Date    Esophagogastroduodenoscopy      Cosmetic surgery       Comorbidities/personal factors that affect OT eval/treatment:  · Anxiety  · Bipolar Disorder  · Glaucoma  · OA  · Seizures  · AMS  · Anemia      Referring physician: Adin Sawyer MD  Date referred to OT: 1/16/2017    General Precautions: Standard, fall, aspiration (cardiac, delirium)  Orthopedic Precautions: N/A  Braces: N/A    Do you have any cultural, spiritual, Druze conflicts, given your current situation?: none stated     Patient History:  Living Environment  Lives With: parent(s) (mother)  Living Arrangements: house  Living Environment Comment: Pt was non verbal during session and is poor historian due to cognition  "deficits. No family present. Per previous notes, pt lives in a house with mother.    Prior level of function: No family present during evaluation  Bed Mobility/Transfers:  (unable to determine)  Grooming:  (unable to determine)  Bathing:  (unable to determine)  Upper Body Dressing:  (unable to determine)  Lower Body Dressing:  (unable to determine)  Toileting:  (unable to determine)        Subjective:  Communicated with RN prior to session.      Chief Complaint: none stated  Patient/Family stated goals: none stated    Pain Rating:  (nodedd "yes" to pain but unable to state location or level)    Objective:  Patient found with: telemetry, bed alarm    Pt found in supine in bed. OT/PT introduced themselves to pt with no response. Curtain raised and lights were turned on due to delirium precautions.     Cognitive Exam:  Oriented to: AOxO  Follows Commands/attention: Inattentive and Easily distracted, Follows one simple commands with ~25% accuracy  Communication: non verbal during majority of session. Pt would state random, incoherent words not associated to questions  Memory:  Impaired STM, Impaired LTM and Poor immediate recall  Safety awareness/insight to disability: impaired  Coping skills/emotional control: flat affect  -Pt only responded to yes/no questions by nodding "yes" or "no", majority of session pt's answers were not appropriate to questions.    Visual/perceptual:  Unable to assess. Pt had blank stare and flat affect during session.    Physical Exam:  Postural examination/scapula alignment: cachexia  Skin integrity: discoloration of skin, jaundice  Edema: None noted in BUE    Upper Extremity Range of Motion: Unable to assess due to pt not following commands and cognition; pt depedent in transfers, unable to assess from observation  Right Upper Extremity:   Left Upper Extremity:     Upper Extremity Strength:Unable to assess due to pt not following commands and cognition; pt depedent in transfers, unable to " "assess from observation  Right Upper Extremity:   Left Upper Extremity:     Functional Mobility:  Bed Mobility:  Scooting/Bridging: Dependent, With assist of 2 (uisng drawsheet to HOB)  Supine to Sit: Dependent, With assist of 2  Sit to Supine: Dependent, With assist of 2    Transfers:  Sit <> Stand Assistance: Activity did not occur (due to pt's current cognitive level and not following commands consistently)        Activities of Daily Living: Pt currently dependent in all ADLs.    Balance: Tolerated EOB ~3 minutes with dependent assistance for balance from OT posteriorly, no basic response or reflex when feeling of falling when  let go if pt in a controlled manner with OT posteriorly and PT anteriorly.    Therapeutic Activities and Exercises:    Pt was educated on OT's role and whiteboard updated, pt did not respond with verbalization or gave indication of understanding.    AM-PAC 6 CLICK ADL  How much help from another person does this patient currently need?  1 = Unable, Total/Dependent Assistance  2 = A lot, Maximum/Moderate Assistance  3 = A little, Minimum/Contact Guard/Supervision  4 = None, Modified [place]win/Independent    Putting on and taking off regular lower body clothing? : 1  Bathing (including washing, rinsing, drying)?: 1  Toileting, which includes using toilet, bedpan, or urinal? : 1  Putting on and taking off regular upper body clothing?: 1  Taking care of personal grooming such as brushing teeth?: 1  Eating meals?: 1  Total Score: 6    AM-PAC Raw Score CMS "G-Code Modifier Level of Impairment Assistance   6 % Total / Unable   7 - 9 CM 80 - 100% Maximal Assist   10 - 14 CL 60 - 80% Moderate Assist   15 - 19 CK 40 - 60% Moderate Assist   20 - 22 CJ 20 - 40% Minimal Assist   23 CI 1-20% SBA / CGA   24 CH 0% Independent/ Mod I     Patient left supine with all lines intact, call button in reach, bed alarm on and RN notified     Clinical presentation: Evolving. Currently, pt has several " co-morbidities affecting her participation in ADLs and mobility.      Assessment:  Marely Hamilton is a 51 y.o. female with a medical diagnosis of Hepatic encephalopathy and presents with AMS and not following commands. Session severely limited due to pt's current cognitive level and not following commands consistently. During session, pt was non verbal and could not express any concerns to therapist. Currently, she is dependent in all transfers in ADLs and mobility. Pt is only appropriate for one discpline of therapy due to current level of function and not following commands. PT will follow pt and will inform OT when any mental or functional changes has occurred. She will be discharged from skilled OT acute services.    Discharge recommendations: Discharge Facility/Level Of Care Needs: HHOT with 24/7 supervision/assistance pending changes in mental status and further assessment.    Barriers to discharge:   Requires increased assistance for all ADLs and mobility.    Equipment recommendations:  (TBD pending progress)     GOALS:   Occupational Therapy Goals     Not on file      Multidisciplinary Problems (Resolved)        Problem: Occupational Therapy Goal    Goal Priority Disciplines Outcome Interventions   Occupational Therapy Goal   (Resolved)     OT, PT/OT Outcome(s) achieved              PLAN:  DC from acute OT services. PT will inform OT services if pt has any medical or functional changes.  OT G-codes  Functional Assessment Tool Used: [facility]  Score: 6  Functional Limitation: Self care  Self Care Current Status (): HIGINIO  Self Care Goal Status (): CN  Self Care Discharge Status (): BALBIR Ray  01/16/2017

## 2017-01-16 NOTE — PLAN OF CARE
Problem: Physical Therapy Goal  Goal: Physical Therapy Goal  Goals to be met by: 2017      Patient will increase functional independence with mobility by performin. Supine to sit with Moderate Assistance  2. Sit to supine with Moderate Assistance  3. Sit to stand transfer with Maximum Assistance  4. Gait  x 10 feet with Maximum Assistance with or without appropriate AD.

## 2017-01-16 NOTE — PLAN OF CARE
"Problem: Occupational Therapy Goal  Goal: Occupational Therapy Goal  Outcome: Outcome(s) achieved Date Met:  01/16/17  OT evaluation completed. Currently, pt is dependent for mobility and ADLs. She follows commands with ~25% accuracy and is only responding to "yes" or "no" questions with head nodding. Pt is only one discpline appropriate at this time due to AMS and not following commands. PT will inform pt if any changes in medical status. Pt will be discharged from skilled OT acute services.     BALBIR Jimenez  1/16/2017             "

## 2017-01-16 NOTE — ASSESSMENT & PLAN NOTE
- Cirrhosis secondary to alcohol abuse  - Review principal problem for more elaboration   - Will place her Ceftriaxone 1 gm BID.  - No fluid to be tapped in her ascitic fluid  - Will consult hepatology and waiting their input.

## 2017-01-16 NOTE — PLAN OF CARE
Problem: Physical Therapy Goal  Goal: Physical Therapy Goal  Goals to be met by: 2017     Patient will increase functional independence with mobility by performin. Supine to sit with Moderate Assistance  2. Sit to supine with Moderate Assistance  3. Sit to stand transfer with Maximum Assistance  4. Gait x 10 feet with Maximum Assistance with or without appropriate AD.   Outcome: Ongoing (interventions implemented as appropriate)  PT evaluation moose Reynaga, DILSHAD  2017

## 2017-01-16 NOTE — ASSESSMENT & PLAN NOTE
- Could be secondary to her chronic liver disease   - Will hold DVT PPx given her thrombocytopenia and high INR

## 2017-01-16 NOTE — ASSESSMENT & PLAN NOTE
- Non compliance history of her medication  - Ammonia at presentation was 50   - No clear etiology, differential diagnosis include but not limited to Hepatic Encephalopathy, medication overdose, deterioration of her ESLD, metabolic etiologies or infectious process.   - Delirium precaution :    Daytime: awake, up in chair as tolerated, tv on, window shades up, frequent reorientation.   Nighttime: in bed, minimize interruptions, tv off, window shades down.  - Resumed lactulose and aim for bowel movement 3-4 times bowel movements   - Will consult Hepatology for their input    MELD-Na score: 22 at 1/16/2017  4:38 AM  MELD score: 22 at 1/16/2017  4:38 AM  Calculated from:  Serum Creatinine: 0.6 mg/dL (Rounded to 1) at 1/16/2017  4:38 AM  Serum Sodium: 140 mmol/L (Rounded to 137) at 1/16/2017  4:38 AM  Total Bilirubin: 11.7 mg/dL at 1/16/2017  4:38 AM  INR(ratio): 1.8 at 1/16/2017  4:38 AM  Age: 51 years

## 2017-01-16 NOTE — SUBJECTIVE & OBJECTIVE
Interval History:     Over night, she was stable no agitation or disruptive behavior, resumed her Bipolar medication and negative for alcohol in her urine.      Review of Systems   Constitutional: Positive for fatigue. Negative for activity change, appetite change, chills, diaphoresis and fever.   HENT: Negative for congestion and dental problem.    Eyes: Negative for photophobia, pain, discharge and redness.   Respiratory: Negative for apnea, cough, choking and shortness of breath.    Cardiovascular: Negative for chest pain, palpitations and leg swelling.   Gastrointestinal: Negative for abdominal distention, abdominal pain, anal bleeding and blood in stool.   Genitourinary: Negative for difficulty urinating, dysuria, flank pain and frequency.   Musculoskeletal: Negative for back pain and gait problem.   Skin: Positive for color change and pallor.   Neurological: Negative for dizziness, facial asymmetry and headaches.   Hematological: Does not bruise/bleed easily.     Objective:     Vital Signs (Most Recent):  Temp: 98 °F (36.7 °C) (01/16/17 0329)  Pulse: 90 (01/16/17 0329)  Resp: 15 (01/16/17 0329)  BP: (!) 159/67 (01/16/17 0329)  SpO2: 96 % (01/16/17 0329) Vital Signs (24h Range):  Temp:  [97.6 °F (36.4 °C)-98.7 °F (37.1 °C)] 98 °F (36.7 °C)  Pulse:  [] 90  Resp:  [15-17] 15  SpO2:  [95 %-98 %] 96 %  BP: (149-187)/(66-82) 159/67     Weight: 52.1 kg (114 lb 12.8 oz)  Body mass index is 20.34 kg/(m^2).  No intake or output data in the 24 hours ending 01/16/17 0717   Physical Exam   Constitutional: She appears well-developed. No distress.   HENT:   Sclerae of face and conjunctivae    Eyes: Right eye exhibits no discharge. Left eye exhibits no discharge. Scleral icterus is present.   Neck: Normal range of motion. Neck supple. No JVD present. No thyromegaly present.   Cardiovascular: Normal rate and regular rhythm.  Exam reveals no friction rub.    No murmur heard.  Pulmonary/Chest: Effort normal and breath  sounds normal. No respiratory distress. She has no wheezes. She has no rales.   Abdominal: Soft. Bowel sounds are normal. She exhibits no distension. There is no tenderness.   Neurological: No cranial nerve deficit. Coordination normal.   conscious but only oriented to person and place.    Skin: She is not diaphoretic.   Vitals reviewed.    Complete Blood Count Coagulation Profile       Recent Labs  Lab 01/15/17  1406 01/16/17  0438   WBC 12.34 8.16   HGB 10.7* 10.0*   HCT 31.4* 28.9*   PLT 70* 58*   MCV 94 96   RDW 16.0* 15.9*      Recent Labs  Lab 01/15/17  2228 01/16/17  0438   INR 1.8* 1.8*        Basic Metabolic Panel  Liver Function Test and Lipid Profile     Recent Labs  Lab 01/15/17  1406 01/16/17  0438    140   K 3.6 3.5    111*   CO2 24 23   BUN 16 12   CREATININE 0.7 0.6   CALCIUM 9.9 8.9   MG  --  1.7      Recent Labs  Lab 01/15/17  1406 01/16/17  0438   PROT 7.9 6.8   BILITOT 14.1* 11.7*   ALKPHOS 115 111   ALT 40 34   AST 64* 50*       Lab Results   Component Value Date    CHOL 175 06/15/2015    HDL 30 (L) 06/15/2015    LDLCALC 125.4 06/15/2015    TRIG 98 06/15/2015        POCT Glucose HbA1c   No results for input(s): POCTGLUCOSE in the last 168 hours. No results found for: HGBA1C     Cardiac Marker Echocardiography   No results for input(s): TROPONINI, BNP in the last 168 hours. EF   Date Value Ref Range Status   10/29/2015 65 55 - 65         Blood Culture  Urine Culture   Lab Results   Component Value Date    LABBLOO No Growth to date 01/15/2017    LABBLOO No Growth to date 01/15/2017    LABBLOO No growth after 5 days. 09/19/2015    LABBLOO No growth after 5 days. 09/19/2015    Lab Results   Component Value Date    LABURIN  09/18/2015     STAPHYLOCOCCUS EPIDERMIDIS  10,000 - 49,999 cfu/ml      LABURIN ESCHERICHIA COLI  >100,000 cfu/ml   09/05/2015        Medications:  Scheduled Meds:   cefTRIAXone (ROCEPHIN) IVPB  1 g Intravenous Q12H    folic acid  1,000 mcg Oral Daily    lactulose   20 g Oral TID    levetiracetam  500 mg Oral BID    lithium  300 mg Oral Daily    pantoprazole  40 mg Oral Daily     Continuous Infusions:   PRN Meds:dextrose 50%, dextrose 50%, glucagon (human recombinant), glucose, glucose, ondansetron

## 2017-01-16 NOTE — CONSULTS
"[facility]  Adult Nutrition  Consult Note    SUMMARY     Recommendations    Recommendation/Intervention: 1. Boost Plus TID (unable to obtain from pt which flavor she prefers); cont Regular diet unless pt becomes edematous; in that case add 2gm Na restriction 2. Per prior notes, pt was on Megace last year; consider appetite stimulant if meal intake remians poor; pt w/ 26# desired wt gain since RD initially eval'd pt in 10/2015  3. Consider IVF if PO intake remains poor  Goals: Maintain meal intake >/=75%  Nutrition Goal Status: new  Communication of RD Recs: reviewed with RN (Rell)    Continuum of Care Plan    Referral to Outpatient Services: other (see comments) (Nutr D/c Plan: unable to determine @ this time)    Reason for Assessment    Reason for Assessment: physician consult  Diagnosis: liver disease, other (see comments) (hepatic encephalopathy 2/2 ESLD)  Relevent Medical History: ESLD 2/2 EtOH abuse, Bipolar   Interdisciplinary Rounds: did not attend     General Information Comments: Majority of nutr hx obtained from pre-tx w/u nutr note; pt seen 10/2015 in outpatient clinic. At that time she was only 40.2kg; was drinking Boost TID & was on Megace as well.Attempted to speak to pt; no fmly present in room. Pt only answered a few of my "yes"/"no" questions. Stared blankly at me when I attempted to obtain more info from her.     Nutrition Prescription Ordered    Current Diet Order: Regular        Evaluation of Received Nutrients/Fluid Intake        Energy Calories Required: not meeting needs                 Protein Required: not meeting needs           Other Fluid (mL):  (IVPB abx)      Fluid Required: not meeting needs  Comments: LBM 1/15     Nutrition/Diet History    Patient Reported Diet/Restrictions/Preferences: other (see comments) (JAMIL)  Typical Food/Fluid Intake: JAMIL  Food Preferences: JAMIL     Supplemental Drinks or Food Habits: Boost Plus (pta per prior nutr eval note)  Factors " Affecting Nutritional Intake: impaired cognitive status/motor control, decreased appetite           Labs/Tests/Procedures/Meds       Pertinent Labs Reviewed: reviewed  Pertinent Labs Comments: tbili 11.7, Alb 3.3, other LFT's noted  Pertinent Medications Reviewed: reviewed  Pertinent Medications Comments: folic acid, lactulose, lithium, pantoprazole    Physical Findings    Overall Physical Appearance: generalized wasting        Skin: jaundice, other (see comments) (Francisco 18)    Anthropometrics       Height (inches): 62.99 in  Weight Method: Bed Scale  Weight (kg): 52.1 kg     Ideal Body Weight (IBW), Female: 114.95 lb     % Ideal Body Weight, Female (lb): 99.92 lb  BMI (kg/m2): 20.35  BMI Grade: 18.5-24.9 - normal  Usual Body Weight (UBW), k kg  % Usual Body Weight: 110.85     Weight Loss: other (see comments) (none noted per prior notes)        Estimated/Assessed Needs    Weight Used For Calorie Calculations: 52.1 kg (114 lb 13.8 oz)   Height (cm): 160 cm     Energy Need Method: [place]-St Jeor (x 1.4 = 1558 cals)    RMR ([place]-St. Jeor Equation): 1113.48        Weight Used For Protein Calculations: 52.1 kg (114 lb 13.8 oz)  Protein Requirements: 62-73 gms (1.2-1.4 gms/kg)    Fluid Need Method: RDA Method (1ml/kcal or per MD)        Malnutrition (Undernutrition) Diagnosis    % Intake of Estimated Energy Needs: Other (Comment) (none recorded)  % Meal Intake: Other (comment) (unknown)  Malnutrition Reason: chronic, illness related     Nutrition Diagnosis    Nutrition Problem: Increased nutrient needs  Etiology/Related To: physiological factors w/ ESLD  Nutrition Diagnosis Signs/Symptoms As Evidenced By: EPN  Nutrition Diagnosis Status: New    Monitor and Evaluation    Food and Nutrient Intake: energy intake, food and beverage intake  Food and Nutrient Adminstration: diet order        Anthropometric Measurements: weight, weight change  Biochemical Data, Medical Tests and Procedures: glucose/endocrine profile,  electrolyte and renal panel, gastrointestinal profile  Nutrition-Focused Physical Findings: overall appearance    Nutrition Risk    Level of Risk: high    Nutrition Follow-Up    RD Follow-up?: Yes

## 2017-01-16 NOTE — ASSESSMENT & PLAN NOTE
- Hospitalized for crystal and psychosis x 2; also has had depression (total length 4) (duration 3 wks and 1.5 weeks with the others)  - On lithium daily and follow with her psychiatric  - Lithium level is normal  Resumed Lithium

## 2017-01-16 NOTE — H&P
"[facility] Hospital Medicine    Patient Name: Marely Hamilton  MRN: [id number]  Patient Class: IP- Inpatient   Admission Date: 1/15/2017  Length of Stay: 0 days  Attending Physician: Monalisa Bowen MD  Primary Care Provider: Viktor Ross MD    [facility] Medicine Team: Networked reference to record PCT  Adin Sawyer MD    Subjective:     Principal Problem:Hepatic encephalopathy    HPI:  This is Ms. Marely Hamilton, 51 year female known to have Liver Cirrhosis and ESLD secondary to alcohol abuse, Bipolar disorder she was brought to ED by her mother after she noticed that she was confused and her skin was discolor for the last couple of days.    When we evaluate the patient at bedside, she was by herself and not accompanied by anyone, patient was not good historian and she couldn't reply the question we were asking. So I called the mother Joy Oconnor [phone] and ask questions about current presentation. So based on mother conversation over the phone she  claimed that she was non complaint with her medication especially Lactulose and she did not keep track of her bowel movement on daily basis. Mother also mention that she was having some change in her basal mentation and when you ask her question and doesn't answer question and taking about her sister and cats and "doesn't make sense". Patient lives with her mother. Her mother noticed change in her face color and eyes color in the last couple of weeks more noticeably in the last couple of days. Mother denies if she observed any fever chills, or symptoms systemic infections and lethargy. No clear when she drinks the last alcohol drink.                  Review of Systems   Unable to perform ROS: Mental status change     Objective:     Vital Signs (Most Recent):  Temp: 98.7 °F (37.1 °C) (01/15/17 1153)  Pulse: 92 (01/15/17 2016)  Resp: 15 (01/15/17 1153)  BP: (!) 168/78 (01/15/17 2016)  SpO2: 96 % (01/15/17 2016) Vital Signs (24h " Range):  Temp:  [98.7 °F (37.1 °C)] 98.7 °F (37.1 °C)  Pulse:  [] 92  Resp:  [15] 15  SpO2:  [95 %-98 %] 96 %  BP: (151-187)/(66-82) 168/78     Weight: 47.2 kg (104 lb)  Body mass index is 18.42 kg/(m^2).  No intake or output data in the 24 hours ending 01/15/17 2024   Physical Exam   Constitutional: She appears well-developed. No distress.   HENT:   Sclerae of face and conjunctivae    Eyes: Right eye exhibits no discharge. Left eye exhibits no discharge. Scleral icterus is present.   Neck: Normal range of motion. Neck supple. No JVD present. No thyromegaly present.   Cardiovascular: Normal rate and regular rhythm.  Exam reveals no friction rub.    No murmur heard.  Pulmonary/Chest: Effort normal and breath sounds normal. No respiratory distress. She has no wheezes. She has no rales.   Abdominal: Soft. Bowel sounds are normal. She exhibits no distension. There is no tenderness.   Neurological: No cranial nerve deficit. Coordination normal.   conscious but only oriented to person and place.    Skin: She is not diaphoretic.   Vitals reviewed.    Complete Blood Count Coagulation Profile       Recent Labs  Lab 01/15/17  1406   WBC 12.34   HGB 10.7*   HCT 31.4*   PLT 70*   MCV 94   RDW 16.0*    No results for input(s): INR, APTT in the last 168 hours.    Invalid input(s): PT     Basic Metabolic Panel  Liver Function Test and Lipid Profile     Recent Labs  Lab 01/15/17  1406      K 3.6      CO2 24   BUN 16   CREATININE 0.7   CALCIUM 9.9      Recent Labs  Lab 01/15/17  1406   PROT 7.9   BILITOT 14.1*   ALKPHOS 115   ALT 40   AST 64*       Lab Results   Component Value Date    CHOL 175 06/15/2015    HDL 30 (L) 06/15/2015    LDLCALC 125.4 06/15/2015    TRIG 98 06/15/2015        POCT Glucose HbA1c   No results for input(s): POCTGLUCOSE in the last 168 hours. No results found for: HGBA1C     Cardiac Marker Echocardiography   No results for input(s): TROPONINI, BNP in the last 168 hours. EF   Date Value Ref  Range Status   10/29/2015 65 55 - 65         Blood Culture  Urine Culture   Lab Results   Component Value Date    LABBLOO No growth after 5 days. 09/19/2015    LABBLOO No growth after 5 days. 09/19/2015    Lab Results   Component Value Date    LABURIN  09/18/2015     STAPHYLOCOCCUS EPIDERMIDIS  10,000 - 49,999 cfu/ml      LABURIN ESCHERICHIA COLI  >100,000 cfu/ml   09/05/2015        Medications:  Scheduled Meds:   cefTRIAXone (ROCEPHIN) IVPB  1 g Intravenous Q12H    [START ON 1/16/2017] enoxaparin  30 mg Subcutaneous Daily    [START ON 1/16/2017] folic acid  1,000 mcg Oral Daily    lactulose  20 g Oral TID    levetiracetam  500 mg Oral BID    [START ON 1/16/2017] lithium  300 mg Oral Daily    [START ON 1/16/2017] pantoprazole  40 mg Oral Daily     Continuous Infusions:   PRN Meds:dextrose 50%, dextrose 50%, glucagon (human recombinant), glucose, glucose, olanzapine, ondansetron       Assessment/Plan:      * Hepatic encephalopathy  - Non compliance history of her medication  - Ammonia at presentation was 50   - No clear etiology, differential diagnosis include but not limited to Hepatic Encephalopathy, medication overdose, deterioration of her ESLD, metabolic etiologies or infectious process.   - Delirium precaution :    Daytime: awake, up in chair as tolerated, tv on, window shades up, frequent reorientation.   Nighttime: in bed, minimize interruptions, tv off, window shades down.  - Resumed lactulose and aim for bowel movement 3-4 times bowel movements   - Will consult Hepatology for their input    MELD-Na score: 18 at 3/29/2016 12:20 PM  MELD score: 18 at 3/29/2016 12:20 PM  Calculated from:  Serum Creatinine: 0.8 mg/dL (Rounded to 1) at 3/29/2016 12:20 PM  Serum Sodium: 137 mmol/L at 3/29/2016 12:20 PM  Total Bilirubin: 8.1 mg/dL at 3/29/2016 12:20 PM  INR(ratio): 1.4 at 3/29/2016 12:20 PM  Age: 51 years        Cirrhosis, Laennec's  - Cirrhosis secondary to alcohol abuse  - Review principal problem for  more elaboration   - Will place her Ceftriaxone 1 gm BID.  - No fluid to be tapped in her ascitic fluid  - Will consult hepatology      Thrombocytopenia  - Could be secondary to her chronic liver disease   - Will make her DVT PPx as 30 mg SQ    Chronic liver failure  - Review principal problem for more elaboration     Bipolar disorder in remission  - Hospitalized for crystal and psychosis x 2; also has had depression (total length 4) (duration 3 wks and 1.5 weeks with the others)  - On lithium daily and follow with her psychiatric  - Lithium level is normal        Anemia    Recent Labs  Lab 01/15/17  1406   HGB 10.7*   HCT 31.4*   - epoetin eladio (PROCRIT) injection 40,000 Units      Alcohol dependence in remission  - No clear history of alcohol use  - Will send for alcohol level       Malnutrition  - Will consult dietary for their input.       VTE Risk Mitigation         Ordered     enoxaparin injection 30 mg  Daily     Route:  Subcutaneous        01/15/17 1926     Medium Risk of VTE  Once      01/15/17 1926          Adin Sawyer MD  Department of Hospital Medicine   [facility]

## 2017-01-16 NOTE — ASSESSMENT & PLAN NOTE
- Non compliance history of her medication  - Ammonia at presentation was 50   - No clear etiology, differential diagnosis include but not limited to Hepatic Encephalopathy, medication overdose, deterioration of her ESLD, metabolic etiologies or infectious process.   - Delirium precaution :    Daytime: awake, up in chair as tolerated, tv on, window shades up, frequent reorientation.   Nighttime: in bed, minimize interruptions, tv off, window shades down.  - Resumed lactulose and aim for bowel movement 3-4 times bowel movements   - Will consult Hepatology for their input    MELD-Na score: 18 at 3/29/2016 12:20 PM  MELD score: 18 at 3/29/2016 12:20 PM  Calculated from:  Serum Creatinine: 0.8 mg/dL (Rounded to 1) at 3/29/2016 12:20 PM  Serum Sodium: 137 mmol/L at 3/29/2016 12:20 PM  Total Bilirubin: 8.1 mg/dL at 3/29/2016 12:20 PM  INR(ratio): 1.4 at 3/29/2016 12:20 PM  Age: 51 years

## 2017-01-16 NOTE — CONSULTS
"Hepatology  Consult note      SUBJECTIVE:     Reason for Consult: ESLD    History of Present Illness:  Patient is a 51 y.o. 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    No family presently at bedside, however the mother had reported that she had noticed a progressive decline in mental function over the past week.  She was unsure if the patient was taking her lactulose as prescribed.  No reported ETOH use, however the mother was unsure of this.  No known fevers, chills, rigors.      The patient was previously worked up for liver transplant at [facility] and was in workup for OLT here at [facility] last Spring, however she stopped her evaluation because she said her [facility] physician told her she "didn't need a transplant."  No further follow up since March, 2016.        Facility-Administered Medications Prior to Admission   Medication    epoetin eladio (PROCRIT) injection 40,000 Units    epoetin eladio (PROCRIT) injection 40,000 Units    epoetin eladio (PROCRIT) injection 40,000 Units    epoetin eladio (PROCRIT) injection 40,000 Units     PTA Medications   Medication Sig    clonazePAM (KLONOPIN) 0.5 MG tablet Take 1.5 mg by mouth every evening.     folic acid (FOLVITE) 1 MG tablet TAKE ONE TABLET BY MOUTH EVERY DAY    lithium (ESKALITH) 300 MG capsule Take 300 mg by mouth once daily.     megestrol (MEGACE) 20 MG Tab Take 1 tablet (20 mg total) by mouth once daily.    multivitamin (ONE DAILY MULTIVITAMIN) per tablet Take 1 tablet by mouth once daily.    pantoprazole (PROTONIX) 40 MG tablet Take 40 mg by mouth daily as needed.     pilocarpine HCL 1% (PILOCAR) 1 % ophthalmic solution Place 1 drop into both eyes 3 (three) times daily.     vitamin D 1000 units Tab Take 1,000 mg by mouth once daily.     bethanechol (URECHOLINE) 5 MG Tab Take 5 mg by mouth 2 (two) times daily.     brimonidine-timolol (COMBIGAN) 0.2-0.5 % Drop " Place 1 drop into both eyes 3 (three) times daily.     furosemide (LASIX) 20 MG tablet Take 20 mg by mouth once daily.     lactulose (CHRONULAC) 10 gram/15 mL solution Take 23 mLs (15.3333 g total) by mouth 3 (three) times daily. (Patient taking differently: Take 45 g by mouth 2 (two) times daily. )    megestrol (MEGACE) 20 MG Tab TAKE ONE TABLET BY MOUTH EVERY DAY    ondansetron (ZOFRAN) 4 MG tablet Take 4 mg by mouth every 8 (eight) hours.     phenobarbital (LUMINAL) 32.4 MG tablet Take 32.4 mg by mouth before meals, at bedtime and at 0200.    phytonadione (MEPHYTON) 5 mg Tab Take 2 tablets (10 mg total) by mouth once daily. Take 10 mg daily x 3 then 10 mg weekly    ranitidine (ZANTAC) 300 MG tablet Take 150 mg by mouth 2 (two) times daily as needed.     sodium polystyrene (KAYEXALATE) 15 gram/60 mL Susp Take 120 mLs (30 g total) by mouth as directed.    spironolactone (ALDACTONE) 50 MG tablet Take 1 tablet (50 mg total) by mouth once daily.       Review of patient's allergies indicates:   Allergen Reactions    Sulfa (sulfonamide antibiotics) Rash        Past Medical History   Diagnosis Date    Alcohol abuse, in remission 6/15/2015     14.5 weeks ago; AA weekly    Anemia     Anxiety 6/15/2015    Behavioral problem     Bipolar disorder     Bipolar disorder in remission 6/15/2015    Cirrhosis, Laennec's 6/15/2015    Depression     Encounter for blood transfusion     Epistaxis 6/15/2015    Fatigue     Glaucoma     Hematuria     Hepatic encephalopathy 6/15/2015    Hepatic enlargement     History of psychiatric care     History of psychiatric hospitalization     History of seizure 6/15/2015     1    hx of intentional Tylenol overdose 6/15/2015     2005- situational and hx of bipolar    Jeana     Osteoarthritis     Other ascites 6/15/2015    Psychiatric problem     Renal disorder     Seizures     Self-harming behavior     Suicide attempt     Therapy     Thrombocytopenia 6/15/2015      Past Surgical History   Procedure Laterality Date    Esophagogastroduodenoscopy      Cosmetic surgery       Family History   Problem Relation Age of Onset    Alcohol abuse Father     Suicide Father     Bipolar disorder Father     Alcohol abuse Sister     Hypertension Mother     Alcohol abuse Paternal Grandfather     Drug abuse Neg Hx     Dementia Neg Hx      Social History   Substance Use Topics    Smoking status: Never Smoker    Smokeless tobacco: None    Alcohol use No      Comment: pt denies at this time       Review of Systems:  Could not obtain 2/2 patient condition      OBJECTIVE:     Vital Signs (Most Recent)  Temp: 98.2 °F (36.8 °C) (01/16/17 1151)  Pulse: 102 (01/16/17 1200)  Resp: 17 (01/16/17 1151)  BP: (!) 142/73 (01/16/17 1151)  SpO2: 99 % (01/16/17 1151)    Physical Exam:  General appearance: no acute distress  Eyes: scleral icterus  Lungs: breath sounds vesicular in nature, air entry equal bilaterally,   Heart: regular rate and rhythm, S1, S2 clearly audible,  Abdomen: soft, non-tender, non-distended;  no rebound tenderness, rigidity, or guarding  Extremities: clubbing bilaterally  Pulses: 2+ and symmetric  Skin: Skin color, texture, turgor normal.   Neurologic: Oriented to place and person.  Intermittent nonsensical speech.       Laboratory    CBC:   Recent Labs  Lab 01/15/17  1406 01/16/17  0438   WBC 12.34 8.16   RBC 3.34* 3.02*   HGB 10.7* 10.0*   HCT 31.4* 28.9*   PLT 70* 58*   MCV 94 96   MCH 32.0* 33.1*   MCHC 34.1 34.6     BMP:   Recent Labs  Lab 01/15/17  1406 01/16/17  0438    93    140   K 3.6 3.5    111*   CO2 24 23   BUN 16 12   CREATININE 0.7 0.6   CALCIUM 9.9 8.9   MG  --  1.7     Coagulation:   Recent Labs  Lab 01/16/17  0438   INR 1.8*           ASSESSMENT/PLAN:     ESLD 2/2 ETOH presenting with hepatic encephalopathy likely 2/2 medication noncompliance.  Patient has no obvious ascites for drainage by U/S, her UA was not consistent with infection.   She has no white count and no fevers.        Recommendations:   Check PETH  Continue lactulose with dose titrated to have 3-4 liquid BM's per day  Can start Rifaximin 500mg BID  Check daily CMP    Will revisit the idea of potential liver transplant evaluation with patient and mother when patient's mental status has improved (patient previously declined evaluation).      Gagandeep Nunez   Gastroenterology Fellow PGY V  [phone]

## 2017-01-16 NOTE — PLAN OF CARE
Problem: Patient Care Overview  Goal: Plan of Care Review  Recommendations     Recommendation/Intervention: 1. Boost Plus TID (unable to obtain from pt which flavor she prefers); cont Regular diet unless pt becomes edematous; in that case add 2gm Na restriction 2. Per prior notes, pt was on Megace last year; consider appetite stimulant if meal intake remians poor; pt w/ 26# desired wt gain since RD initially eval'd pt in 10/2015 3. Consider IVF if PO intake remains poor  Goals: Maintain meal intake >/=75%  Nutrition Goal Status: new  Communication of RD Recs: reviewed with RN (Rell)

## 2017-01-16 NOTE — SUBJECTIVE & OBJECTIVE
Review of Systems   Unable to perform ROS: Mental status change     Objective:     Vital Signs (Most Recent):  Temp: 98.7 °F (37.1 °C) (01/15/17 1153)  Pulse: 92 (01/15/17 2016)  Resp: 15 (01/15/17 1153)  BP: (!) 168/78 (01/15/17 2016)  SpO2: 96 % (01/15/17 2016) Vital Signs (24h Range):  Temp:  [98.7 °F (37.1 °C)] 98.7 °F (37.1 °C)  Pulse:  [] 92  Resp:  [15] 15  SpO2:  [95 %-98 %] 96 %  BP: (151-187)/(66-82) 168/78     Weight: 47.2 kg (104 lb)  Body mass index is 18.42 kg/(m^2).  No intake or output data in the 24 hours ending 01/15/17 2024   Physical Exam   Constitutional: She appears well-developed. No distress.   HENT:   Sclerae of face and conjunctivae    Eyes: Right eye exhibits no discharge. Left eye exhibits no discharge. Scleral icterus is present.   Neck: Normal range of motion. Neck supple. No JVD present. No thyromegaly present.   Cardiovascular: Normal rate and regular rhythm.  Exam reveals no friction rub.    No murmur heard.  Pulmonary/Chest: Effort normal and breath sounds normal. No respiratory distress. She has no wheezes. She has no rales.   Abdominal: Soft. Bowel sounds are normal. She exhibits no distension. There is no tenderness.   Neurological: No cranial nerve deficit. Coordination normal.   conscious but only oriented to person and place.    Skin: She is not diaphoretic.   Vitals reviewed.    Complete Blood Count Coagulation Profile       Recent Labs  Lab 01/15/17  1406   WBC 12.34   HGB 10.7*   HCT 31.4*   PLT 70*   MCV 94   RDW 16.0*    No results for input(s): INR, APTT in the last 168 hours.    Invalid input(s): PT     Basic Metabolic Panel  Liver Function Test and Lipid Profile     Recent Labs  Lab 01/15/17  1406      K 3.6      CO2 24   BUN 16   CREATININE 0.7   CALCIUM 9.9      Recent Labs  Lab 01/15/17  1406   PROT 7.9   BILITOT 14.1*   ALKPHOS 115   ALT 40   AST 64*       Lab Results   Component Value Date    CHOL 175 06/15/2015    HDL 30 (L) 06/15/2015     LDLCALC 125.4 06/15/2015    TRIG 98 06/15/2015        POCT Glucose HbA1c   No results for input(s): POCTGLUCOSE in the last 168 hours. No results found for: HGBA1C     Cardiac Marker Echocardiography   No results for input(s): TROPONINI, BNP in the last 168 hours. EF   Date Value Ref Range Status   10/29/2015 65 55 - 65         Blood Culture  Urine Culture   Lab Results   Component Value Date    LABBLOO No growth after 5 days. 09/19/2015    LABBLOO No growth after 5 days. 09/19/2015    Lab Results   Component Value Date    LABURIN  09/18/2015     STAPHYLOCOCCUS EPIDERMIDIS  10,000 - 49,999 cfu/ml      LABURIN ESCHERICHIA COLI  >100,000 cfu/ml   09/05/2015        Medications:  Scheduled Meds:   cefTRIAXone (ROCEPHIN) IVPB  1 g Intravenous Q12H    [START ON 1/16/2017] enoxaparin  30 mg Subcutaneous Daily    [START ON 1/16/2017] folic acid  1,000 mcg Oral Daily    lactulose  20 g Oral TID    levetiracetam  500 mg Oral BID    [START ON 1/16/2017] lithium  300 mg Oral Daily    [START ON 1/16/2017] pantoprazole  40 mg Oral Daily     Continuous Infusions:   PRN Meds:dextrose 50%, dextrose 50%, glucagon (human recombinant), glucose, glucose, olanzapine, ondansetron

## 2017-01-16 NOTE — ASSESSMENT & PLAN NOTE
Recent Labs  Lab 01/15/17  1406 01/16/17  0438   HGB 10.7* 10.0*   HCT 31.4* 28.9*   - epoetin eladio (PROCRIT) injection 40,000 Units

## 2017-01-17 LAB
ALBUMIN SERPL BCP-MCNC: 3.2 G/DL
ALP SERPL-CCNC: 107 U/L
ALT SERPL W/O P-5'-P-CCNC: 34 U/L
AMMONIA PLAS-SCNC: 48 UMOL/L
ANION GAP SERPL CALC-SCNC: 7 MMOL/L
AST SERPL-CCNC: 56 U/L
BASOPHILS # BLD AUTO: 0.02 K/UL
BASOPHILS NFR BLD: 0.4 %
BILIRUB SERPL-MCNC: 9.5 MG/DL
BUN SERPL-MCNC: 9 MG/DL
CALCIUM SERPL-MCNC: 9 MG/DL
CHLORIDE SERPL-SCNC: 113 MMOL/L
CO2 SERPL-SCNC: 22 MMOL/L
CREAT SERPL-MCNC: 0.6 MG/DL
DIFFERENTIAL METHOD: ABNORMAL
EOSINOPHIL # BLD AUTO: 0.2 K/UL
EOSINOPHIL NFR BLD: 3.1 %
ERYTHROCYTE [DISTWIDTH] IN BLOOD BY AUTOMATED COUNT: 16 %
EST. GFR  (AFRICAN AMERICAN): >60 ML/MIN/1.73 M^2
EST. GFR  (NON AFRICAN AMERICAN): >60 ML/MIN/1.73 M^2
GLUCOSE SERPL-MCNC: 113 MG/DL
HCT VFR BLD AUTO: 29.4 %
HGB BLD-MCNC: 10 G/DL
INR PPP: 1.8
LYMPHOCYTES # BLD AUTO: 1 K/UL
LYMPHOCYTES NFR BLD: 18.7 %
MAGNESIUM SERPL-MCNC: 1.8 MG/DL
MCH RBC QN AUTO: 32.5 PG
MCHC RBC AUTO-ENTMCNC: 34 %
MCV RBC AUTO: 96 FL
MONOCYTES # BLD AUTO: 0.5 K/UL
MONOCYTES NFR BLD: 9.1 %
NEUTROPHILS # BLD AUTO: 3.6 K/UL
NEUTROPHILS NFR BLD: 68.3 %
PHOSPHATE SERPL-MCNC: 4.3 MG/DL
PLATELET # BLD AUTO: 65 K/UL
PMV BLD AUTO: 9.6 FL
POTASSIUM SERPL-SCNC: 4.2 MMOL/L
PROT SERPL-MCNC: 6.8 G/DL
PROTHROMBIN TIME: 17.9 SEC
RBC # BLD AUTO: 3.08 M/UL
SODIUM SERPL-SCNC: 142 MMOL/L
WBC # BLD AUTO: 5.19 K/UL

## 2017-01-17 PROCEDURE — 84100 ASSAY OF PHOSPHORUS: CPT

## 2017-01-17 PROCEDURE — 82140 ASSAY OF AMMONIA: CPT

## 2017-01-17 PROCEDURE — 11000001 HC ACUTE MED/SURG PRIVATE ROOM

## 2017-01-17 PROCEDURE — 36415 COLL VENOUS BLD VENIPUNCTURE: CPT

## 2017-01-17 PROCEDURE — 95819 EEG AWAKE AND ASLEEP: CPT | Mod: 26,,, | Performed by: PSYCHIATRY & NEUROLOGY

## 2017-01-17 PROCEDURE — 25000003 PHARM REV CODE 250: Performed by: STUDENT IN AN ORGANIZED HEALTH CARE EDUCATION/TRAINING PROGRAM

## 2017-01-17 PROCEDURE — 83735 ASSAY OF MAGNESIUM: CPT

## 2017-01-17 PROCEDURE — 99233 SBSQ HOSP IP/OBS HIGH 50: CPT | Mod: ,,, | Performed by: HOSPITALIST

## 2017-01-17 PROCEDURE — 80053 COMPREHEN METABOLIC PANEL: CPT

## 2017-01-17 PROCEDURE — 95816 EEG AWAKE AND DROWSY: CPT

## 2017-01-17 PROCEDURE — 85610 PROTHROMBIN TIME: CPT

## 2017-01-17 PROCEDURE — 92526 ORAL FUNCTION THERAPY: CPT

## 2017-01-17 PROCEDURE — 99232 SBSQ HOSP IP/OBS MODERATE 35: CPT | Mod: GC,,, | Performed by: INTERNAL MEDICINE

## 2017-01-17 PROCEDURE — 85025 COMPLETE CBC W/AUTO DIFF WBC: CPT

## 2017-01-17 RX ORDER — ALPRAZOLAM 2 MG/1
2 TABLET ORAL NIGHTLY
Status: ON HOLD | COMMUNITY
End: 2017-01-24 | Stop reason: SINTOL

## 2017-01-17 RX ORDER — OLANZAPINE 5 MG/1
10 TABLET, ORALLY DISINTEGRATING ORAL ONCE
Status: COMPLETED | OUTPATIENT
Start: 2017-01-17 | End: 2017-01-17

## 2017-01-17 RX ORDER — TEMAZEPAM 30 MG/1
30 CAPSULE ORAL NIGHTLY PRN
Status: ON HOLD | COMMUNITY
End: 2017-01-24 | Stop reason: SINTOL

## 2017-01-17 RX ORDER — LACTULOSE 10 G/15ML
200 SOLUTION ORAL; RECTAL 3 TIMES DAILY
Status: DISCONTINUED | OUTPATIENT
Start: 2017-01-17 | End: 2017-01-18

## 2017-01-17 RX ADMIN — RIFAXIMIN 550 MG: 550 TABLET ORAL at 10:01

## 2017-01-17 RX ADMIN — LACTULOSE 20 G: 10 SOLUTION ORAL at 03:01

## 2017-01-17 RX ADMIN — LITHIUM CARBONATE 300 MG: 300 CAPSULE, GELATIN COATED ORAL at 09:01

## 2017-01-17 RX ADMIN — LACTULOSE 200 G: 10 SOLUTION ORAL at 03:01

## 2017-01-17 RX ADMIN — LACTULOSE 20 G: 10 SOLUTION ORAL at 05:01

## 2017-01-17 RX ADMIN — LACTULOSE 20 G: 10 SOLUTION ORAL at 12:01

## 2017-01-17 RX ADMIN — LACTULOSE 200 G: 10 SOLUTION ORAL at 11:01

## 2017-01-17 RX ADMIN — PANTOPRAZOLE SODIUM 40 MG: 40 TABLET, DELAYED RELEASE ORAL at 09:01

## 2017-01-17 RX ADMIN — LEVETIRACETAM 500 MG: 500 TABLET, FILM COATED ORAL at 09:01

## 2017-01-17 RX ADMIN — OLANZAPINE 10 MG: 5 TABLET, ORALLY DISINTEGRATING ORAL at 03:01

## 2017-01-17 RX ADMIN — CLOTRIMAZOLE 10 MG: 10 LOZENGE ORAL at 10:01

## 2017-01-17 RX ADMIN — CLOTRIMAZOLE 10 MG: 10 LOZENGE ORAL at 03:01

## 2017-01-17 RX ADMIN — RIFAXIMIN 550 MG: 550 TABLET ORAL at 09:01

## 2017-01-17 RX ADMIN — LACTULOSE 20 G: 10 SOLUTION ORAL at 10:01

## 2017-01-17 RX ADMIN — LACTULOSE 20 G: 10 SOLUTION ORAL at 09:01

## 2017-01-17 RX ADMIN — FOLIC ACID 1000 MCG: 1 TABLET ORAL at 09:01

## 2017-01-17 RX ADMIN — CLOTRIMAZOLE 10 MG: 10 LOZENGE ORAL at 06:01

## 2017-01-17 NOTE — ASSESSMENT & PLAN NOTE
- Non compliance history of her medication  - Ammonia at presentation was 50   - No clear etiology, differential diagnosis include but not limited to Hepatic Encephalopathy, medication overdose, deterioration of her ESLD, metabolic etiologies or infectious process.   - Delirium precaution :    Daytime: awake, up in chair as tolerated, tv on, window shades up, frequent reorientation.   Nighttime: in bed, minimize interruptions, tv off, window shades down.  - Resumed lactulose and aim for bowel movement 3-4 times bowel movements   - Consulted hepatology and they agreed on current plan and will disucss with patient when she is more clear and comprehensive.  - Hepatitis panel and HIV negative  - PETH pending  - If PETH is negative, then patient continues to have decompensated liver disease despite appropriate medical therapy and abstinence    MELD-Na score: 22 at 1/16/2017  4:38 AM  MELD score: 22 at 1/16/2017  4:38 AM  Calculated from:  Serum Creatinine: 0.6 mg/dL (Rounded to 1) at 1/16/2017  4:38 AM  Serum Sodium: 140 mmol/L (Rounded to 137) at 1/16/2017  4:38 AM  Total Bilirubin: 11.7 mg/dL at 1/16/2017  4:38 AM  INR(ratio): 1.8 at 1/16/2017  4:38 AM  Age: 51 years

## 2017-01-17 NOTE — CONSULTS
Consult to manage medications; however according to the primary team patient is unable to clarify her medications at this time due to AMS.  Re Consult when patient can participate in medication management.

## 2017-01-17 NOTE — PHYSICIAN QUERY
"PT Name: Marely Hamilton  MR #: [id number]  Physician Query Form - Nutrition Clarification   Reviewer  Ext [id number] Sumaya Winters RN CDS    This form is a permanent document in the medical record.     Query Date: January 17, 2017  By submitting this query, we are merely seeking further clarification of documentation.. Please utilize your independent clinical judgment when addressing the question(s) below.    The Medical record reflects the following:   Indicators     Supporting Clinical Findings Location in Medical Record   x Wt/ BMI/ Usual Body Weight BMI=18.4 Flow sheet     x Alb          TProt            Pre-Albumin Albumin= 3.9...>3.2   Labs 1/15...>1/17   x Acute or Chronic illness known to have Liver Cirrhosis and ESLD secondary to alcohol abuse, Bipolar disorder she was brought to ED by her mother after she noticed that she was confused  and her skin was discolor for the last couple of days.    H/P 1/15   x % of estimated energy intake over a time frame from p.o., TF or TPN Boost Plus TID (unable to obtain from pt which flavor she prefers); cont Regular diet unless pt becomes edematous; in that case add 2gm Na restriction 2. Per prior notes, pt was on Megace last year; consider appetite stimulant if meal intake remians poor; pt w/ 26# desired wt gain since RD initially eval'd pt in 10/2015 3. Consider IVF if PO intake remains poor   Nutrition care plane 1/16    Weight status over a time frame      Subcutaneous fat and/or muscle loss      Reduced  strength      "Delayed wound healing:, "Failure to thrive"      "Fluid accumulation" or "Edema"      "Hypoalbuminemia"     x Other:  Malnutrition    H/P 1/15     AND / ASPEN Clinical Characteristics (October 2011)  A minimum of two characteristics is recommended for diagnosing either moderate or severe malnutrition    Mild Malnutrition Moderate Malnutrition Severe Malnutrition    Energy Intake from p.o., TF or TPN. < 75% intake of estimated energy needs for less than " 7 days. < 75% intake of estimated energy needs for greater than 7 days. < 50% intake of estimated energy needs for > 5 days   Weight Loss 1-2% in 1 month  5% in 3 months  7.5% in 6 months  10% in one year 1-2 % in 1 week  5% in 1 month  7.5% in 3 months  10% in 6 months  20% in 1 year > 2% in 1 week  > 5% in 1 month  >7.5% in 3 months  >10% in 6 months  >20% in 1 year   Physical Findings None *Mild subcutaneous fat and/or muscle loss  *Mild fluid accumulation  *Stage II decubitus  *Surgical wound or non-healing wound *Mod subcutaneous fat and/or muscle loss  *Mod/severe fluid accumulation  *Stage III or IV decubitus  *Non-healing surgical wound     Provider, please specify the diagnosis or diagnoses associated with above clinical findings.                                [ ] Mild Protein-Calorie Malnutrition  [ x  ] Moderate Protein-Calorie Malnutrition  [ ] Severe Protein-CalorieMalnutrition  [ ] Cachexia  [ ] Anorexia  [ ] Other Nutritional Diagnosis (Specify) ____________________________________  [ ] Clinically Undetermined    Please document in your progress notes daily for the duration of treatment, until resolved, and include in your discharge summary.

## 2017-01-17 NOTE — PLAN OF CARE
Problem: SLP Goal  Goal: SLP Goal  Speech Language Pathology Goals  Goals expected to be met by 1/23    1. Pt will tolerate regular diet/thin liquids without overt s/s of aspiration     Continue current diet at this time, limited trials 2/2 lethargy  Mayra Olivier CCC-SLP  1/17/2017

## 2017-01-17 NOTE — MEDICAL/APP STUDENT
"[facility] Hospital Medicine  Progress Note    Patient Name: Marely Hamilton  MRN: [id number]  Patient Class: IP- Inpatient   Admission Date: 1/15/2017  Length of Stay: 2 days  Attending Physician: Blessing Cherry MD  Primary Care Provider: Viktor Ross MD    [facility] Medicine Team: [facility] 5 Corrie Rogers    Subjective:     HPI:  Ms. Marely Hamilton, 51 year female known to have Liver Cirrhosis and ESLD secondary to alcohol abuse, Bipolar disorder she was brought to ED by her mother after she noticed that she was confused and her skin was discolored for the last couple of days.     Based on a phone conversation with the patient's mother the patient was non complaint with her medication especially Lactulose and she did not keep track of her bowel movement on daily basis. Mother also mention that she was having some change in her basal mentation, for example she doesn't answer question and when talking about her sister and cats she "doesn't make sense". Patient lives with her mother. Her mother noticed change in her face color and eyes color in the last couple of weeks more noticeably in the last couple of days. Mother denies if she observed any fever chills, or symptoms systemic infections and lethargy. Not clear when patient had last alcohol drink.      Interval History:    Principal Problem:Hepatic encephalopathy     This morning patient was extremely sleepy, in contrast to yesterday.   Resident stated patient was rouseable but not talkative. Both the sleepiness and lack of speech represent a worsening of her mental status compared to yesterday.   Nurse stated she actively resisted lactulose medication administration at night, also 2 overnight bowel movements reported.   Jaundice is unchanged.     Review of Systems   Constitutional: Positive for fatigue.   Eyes: Negative for discharge.   Gastrointestinal: Negative for abdominal distention.   Skin: Positive for color change. "   Psychiatric/Behavioral: Positive for confusion.     Objective:     Vital Signs (Most Recent):  Temp: 97 °F (36.1 °C) (01/17/17 1202)  Pulse: 82 (01/17/17 1202)  Resp: 18 (01/17/17 1202)  BP: (!) 163/93 (01/17/17 1202)  SpO2: 97 % (01/17/17 1202) Vital Signs (24h Range):  Temp:  [96.8 °F (36 °C)-99.5 °F (37.5 °C)] 97 °F (36.1 °C)  Pulse:  [] 82  Resp:  [17-19] 18  SpO2:  [96 %-98 %] 97 %  BP: (146-163)/(54-93) 163/93     Weight: 49.4 kg (109 lb)  Body mass index is 19.31 kg/(m^2).  No intake or output data in the 24 hours ending 01/17/17 1404   Physical Exam   Constitutional: She appears well-developed. She appears lethargic.   Eyes: Right eye exhibits no discharge. Left eye exhibits no discharge. Scleral icterus is present.   Cardiovascular: Normal rate, regular rhythm, normal heart sounds and intact distal pulses.    Pulmonary/Chest: Effort normal and breath sounds normal. No respiratory distress.   Abdominal: Soft. Normal appearance. She exhibits no distension and no ascites.   Neurological: She appears lethargic.   Skin: Nails show clubbing.   Psychiatric: She is noncommunicative. She is inattentive.      Significant Labs:   All significant labs over the last 24 hours have been reviewed.       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 Female admitted from ED for altered  mental status and jaundice with a history of ESLD and bipolar disorder.     Principal Problem - Hepatic encephalopathy:  - Continue lactulose medication, if necessary parenteral.  - EEG to exclude absence seizure.     Alcohol Dependence  - in remission, confirmed with negative Peth test.     Malnutrition  - continue regular diet for now    Chronic Liver Failure  - MELD Score = 22 as of 1/17/17 with a bilirubin of 11.7  - Hepatitis and HIV panel returned negative.    - discuss possible liver transplant after current episode is resolved    Anemia:   - Hemoglobin has been stable at 10, hematocrit 29.4.  - continue epoetin    Cirrhosis  - Liver Cirrhosis confirmed with CT Scan on 1/15/17.     - discuss possible liver transplant after current episode is resolved    Thrombocytopenia  - Currently no treatment planned    Bipolar disorder   - Patient has been suffering from Bipolar Disorder since her late teens and it is controlled with medication.   - Continue lithium treatment      Corrie Rogers  Department of Hospital Medicine   [facility]

## 2017-01-17 NOTE — PROGRESS NOTES
"[facility] Hospital Medicine  Progress Note    Patient Name: Marely Hamilton  MRN: [id number]  Patient Class: IP- Inpatient   Admission Date: 1/15/2017  Length of Stay: 2 days  Attending Physician: Monalisa Bowen MD  Primary Care Provider: Viktor Ross MD    [facility] Medicine Team: [facility] 5 Adin Sawyer MD    Subjective:     Principal Problem:Hepatic encephalopathy    HPI:  This is Ms. Marely Hamilton, 51 year female known to have Liver Cirrhosis and ESLD secondary to alcohol abuse, Bipolar disorder she was brought to ED by her mother after she noticed that she was confused and her skin was discolor for the last couple of days.    When we evaluate the patient at bedside, she was by herself and not accompanied by anyone, patient was not good historian and she couldn't reply the question we were asking. So I called the mother Joy Oconnor [phone] and ask questions about current presentation. So based on mother conversation over the phone she  claimed that she was non complaint with her medication especially Lactulose and she did not keep track of her bowel movement on daily basis. Mother also mention that she was having some change in her basal mentation and when you ask her question and doesn't answer question and taking about her sister and cats and "doesn't make sense". Patient lives with her mother. Her mother noticed change in her face color and eyes color in the last couple of weeks more noticeably in the last couple of days. Mother denies if she observed any fever chills, or symptoms systemic infections and lethargy. No clear when she drinks the last alcohol drink.              Interval History:     Over night,     Review of Systems   Constitutional: Positive for fatigue. Negative for activity change, appetite change, chills, diaphoresis and fever.   HENT: Negative for congestion and dental problem.    Eyes: Negative for photophobia, pain, discharge and redness. "   Respiratory: Negative for apnea, cough, choking and shortness of breath.    Cardiovascular: Negative for chest pain, palpitations and leg swelling.   Gastrointestinal: Negative for abdominal distention, abdominal pain, anal bleeding and blood in stool.   Genitourinary: Negative for difficulty urinating, dysuria, flank pain and frequency.   Musculoskeletal: Negative for back pain and gait problem.   Skin: Positive for color change and pallor.   Neurological: Negative for dizziness, facial asymmetry and headaches.   Hematological: Does not bruise/bleed easily.     Objective:     Vital Signs (Most Recent):  Temp: 98.9 °F (37.2 °C) (01/17/17 0325)  Pulse: 84 (01/17/17 0325)  Resp: 19 (01/17/17 0325)  BP: (!) 149/70 (01/17/17 0325)  SpO2: 96 % (01/17/17 0325) Vital Signs (24h Range):  Temp:  [97.2 °F (36.2 °C)-99.5 °F (37.5 °C)] 98.9 °F (37.2 °C)  Pulse:  [] 84  Resp:  [16-19] 19  SpO2:  [96 %-99 %] 96 %  BP: (142-170)/(70-79) 149/70     Weight: 49.4 kg (109 lb)  Body mass index is 19.31 kg/(m^2).  No intake or output data in the 24 hours ending 01/17/17 0508   Physical Exam   Constitutional: She appears well-developed. No distress.   HENT:   Sclerae of face and conjunctivae    Eyes: Right eye exhibits no discharge. Left eye exhibits no discharge. Scleral icterus is present.   Neck: Normal range of motion. Neck supple. No JVD present. No thyromegaly present.   Cardiovascular: Normal rate and regular rhythm.  Exam reveals no friction rub.    No murmur heard.  Pulmonary/Chest: Effort normal and breath sounds normal. No respiratory distress. She has no wheezes. She has no rales.   Abdominal: Soft. Bowel sounds are normal. She exhibits no distension. There is no tenderness.   Neurological: No cranial nerve deficit. Coordination normal.   conscious but only oriented to person and place.    Skin: She is not diaphoretic.   Vitals reviewed.    Complete Blood Count Coagulation Profile       Recent Labs  Lab 01/15/17  1406  01/16/17  0438   WBC 12.34 8.16   HGB 10.7* 10.0*   HCT 31.4* 28.9*   PLT 70* 58*   MCV 94 96   RDW 16.0* 15.9*      Recent Labs  Lab 01/15/17  2228 01/16/17  0438   INR 1.8* 1.8*        Basic Metabolic Panel  Liver Function Test and Lipid Profile     Recent Labs  Lab 01/15/17  1406 01/16/17  0438    140   K 3.6 3.5    111*   CO2 24 23   BUN 16 12   CREATININE 0.7 0.6   CALCIUM 9.9 8.9   MG  --  1.7      Recent Labs  Lab 01/15/17  1406 01/16/17  0438   PROT 7.9 6.8   BILITOT 14.1* 11.7*   ALKPHOS 115 111   ALT 40 34   AST 64* 50*       Lab Results   Component Value Date    CHOL 175 06/15/2015    HDL 30 (L) 06/15/2015    LDLCALC 125.4 06/15/2015    TRIG 98 06/15/2015        POCT Glucose HbA1c   No results for input(s): POCTGLUCOSE in the last 168 hours. No results found for: HGBA1C     Cardiac Marker Echocardiography   No results for input(s): TROPONINI, BNP in the last 168 hours. EF   Date Value Ref Range Status   10/29/2015 65 55 - 65         Blood Culture  Urine Culture   Lab Results   Component Value Date    LABBLOO No Growth to date 01/15/2017    LABBLOO No Growth to date 01/15/2017    LABBLOO No Growth to date 01/15/2017    LABBLOO No Growth to date 01/15/2017    LABBLOO No growth after 5 days. 09/19/2015    Lab Results   Component Value Date    LABURIN No growth 01/15/2017    LABURIN  09/18/2015     STAPHYLOCOCCUS EPIDERMIDIS  10,000 - 49,999 cfu/ml      LABURIN ESCHERICHIA COLI  >100,000 cfu/ml   09/05/2015        Medications:  Scheduled Meds:   clotrimazole  10 mg Oral TID    folic acid  1,000 mcg Oral Daily    lactulose  20 g Oral Q3H    levetiracetam  500 mg Oral BID    lithium  300 mg Oral Daily    pantoprazole  40 mg Oral Daily    rifAXIMimin  550 mg Oral BID     Continuous Infusions:   PRN Meds:dextrose 50%, dextrose 50%, glucagon (human recombinant), glucose, glucose, ondansetron       Assessment/Plan:      * Hepatic encephalopathy  - Non compliance history of her medication  -  Ammonia at presentation was 50   - No clear etiology, differential diagnosis include but not limited to Hepatic Encephalopathy, medication overdose, deterioration of her ESLD, metabolic etiologies or infectious process.   - Delirium precaution :    Daytime: awake, up in chair as tolerated, tv on, window shades up, frequent reorientation.   Nighttime: in bed, minimize interruptions, tv off, window shades down.  - Resumed lactulose and aim for bowel movement 3-4 times bowel movements   - Consulted hepatology and they agreed on current plan and will disucss with patient when she is more clear and comprehensive.  - Still having low bowel movement than the target despite increasing the frequency of her lactulose.   - Hepatitis panel and HIV negative  - PETH pending  - If PETH is negative, then patient continues to have decompensated liver disease despite appropriate medical therapy and abstinence   - Hepatology recommend to investigate further etiology of her AMS.   - Will order EEG.    MELD-Na score: 22 at 1/17/2017  6:20 AM  MELD score: 22 at 1/17/2017  6:20 AM  Calculated from:  Serum Creatinine: 0.6 mg/dL (Rounded to 1) at 1/17/2017  6:20 AM  Serum Sodium: 142 mmol/L (Rounded to 137) at 1/17/2017  6:20 AM  Total Bilirubin: 9.5 mg/dL at 1/17/2017  6:20 AM  INR(ratio): 1.8 at 1/17/2017  6:20 AM  Age: 51 years    Cirrhosis, Laennec's  - Cirrhosis secondary to alcohol abuse  - Review principal problem for more elaboration   - Will place her Ceftriaxone 1 gm BID.  - No fluid to be tapped in her ascitic fluid  - Consult Hepatology and they recommend to continue current plan (increase Lactulose and rifaximin)       Thrombocytopenia  - Could be secondary to her chronic liver disease   - Will hold DVT PPx given her thrombocytopenia and high INR     Chronic liver failure  - Review principal problem for more elaboration     Bipolar disorder in remission  - Hospitalized for crystal and psychosis x 2; also has had depression (total  length 4) (duration 3 wks and 1.5 weeks with the others)  - On lithium daily and follow with her psychiatric  - Lithium level is normal  Resumed Lithium       Anemia    Recent Labs  Lab 01/15/17  1406 01/16/17  0438   HGB 10.7* 10.0*   HCT 31.4* 28.9*   - epoetin eladio (PROCRIT) injection 40,000 Units      Alcohol dependence in remission  - No clear history of alcohol use  - Alcohol level is < 10     Malnutrition  - Will consult dietary for their input.       VTE Risk Mitigation         Ordered     Medium Risk of VTE  Once      01/15/17 1926          Adin Sawyer MD  Department of Hospital Medicine   [facility]

## 2017-01-17 NOTE — PLAN OF CARE
Pt sleeping with nurse at bedside stating pt was very agitated last pm and given Zyprexa.  Will continue to follow.

## 2017-01-17 NOTE — SUBJECTIVE & OBJECTIVE
Interval History:     Over night,     Review of Systems   Constitutional: Positive for fatigue. Negative for activity change, appetite change, chills, diaphoresis and fever.   HENT: Negative for congestion and dental problem.    Eyes: Negative for photophobia, pain, discharge and redness.   Respiratory: Negative for apnea, cough, choking and shortness of breath.    Cardiovascular: Negative for chest pain, palpitations and leg swelling.   Gastrointestinal: Negative for abdominal distention, abdominal pain, anal bleeding and blood in stool.   Genitourinary: Negative for difficulty urinating, dysuria, flank pain and frequency.   Musculoskeletal: Negative for back pain and gait problem.   Skin: Positive for color change and pallor.   Neurological: Negative for dizziness, facial asymmetry and headaches.   Hematological: Does not bruise/bleed easily.     Objective:     Vital Signs (Most Recent):  Temp: 98.9 °F (37.2 °C) (01/17/17 0325)  Pulse: 84 (01/17/17 0325)  Resp: 19 (01/17/17 0325)  BP: (!) 149/70 (01/17/17 0325)  SpO2: 96 % (01/17/17 0325) Vital Signs (24h Range):  Temp:  [97.2 °F (36.2 °C)-99.5 °F (37.5 °C)] 98.9 °F (37.2 °C)  Pulse:  [] 84  Resp:  [16-19] 19  SpO2:  [96 %-99 %] 96 %  BP: (142-170)/(70-79) 149/70     Weight: 49.4 kg (109 lb)  Body mass index is 19.31 kg/(m^2).  No intake or output data in the 24 hours ending 01/17/17 0508   Physical Exam   Constitutional: She appears well-developed. No distress.   HENT:   Sclerae of face and conjunctivae    Eyes: Right eye exhibits no discharge. Left eye exhibits no discharge. Scleral icterus is present.   Neck: Normal range of motion. Neck supple. No JVD present. No thyromegaly present.   Cardiovascular: Normal rate and regular rhythm.  Exam reveals no friction rub.    No murmur heard.  Pulmonary/Chest: Effort normal and breath sounds normal. No respiratory distress. She has no wheezes. She has no rales.   Abdominal: Soft. Bowel sounds are normal. She  exhibits no distension. There is no tenderness.   Neurological: No cranial nerve deficit. Coordination normal.   conscious but only oriented to person and place.    Skin: She is not diaphoretic.   Vitals reviewed.    Complete Blood Count Coagulation Profile       Recent Labs  Lab 01/15/17  1406 01/16/17  0438   WBC 12.34 8.16   HGB 10.7* 10.0*   HCT 31.4* 28.9*   PLT 70* 58*   MCV 94 96   RDW 16.0* 15.9*      Recent Labs  Lab 01/15/17  2228 01/16/17  0438   INR 1.8* 1.8*        Basic Metabolic Panel  Liver Function Test and Lipid Profile     Recent Labs  Lab 01/15/17  1406 01/16/17  0438    140   K 3.6 3.5    111*   CO2 24 23   BUN 16 12   CREATININE 0.7 0.6   CALCIUM 9.9 8.9   MG  --  1.7      Recent Labs  Lab 01/15/17  1406 01/16/17  0438   PROT 7.9 6.8   BILITOT 14.1* 11.7*   ALKPHOS 115 111   ALT 40 34   AST 64* 50*       Lab Results   Component Value Date    CHOL 175 06/15/2015    HDL 30 (L) 06/15/2015    LDLCALC 125.4 06/15/2015    TRIG 98 06/15/2015        POCT Glucose HbA1c   No results for input(s): POCTGLUCOSE in the last 168 hours. No results found for: HGBA1C     Cardiac Marker Echocardiography   No results for input(s): TROPONINI, BNP in the last 168 hours. EF   Date Value Ref Range Status   10/29/2015 65 55 - 65         Blood Culture  Urine Culture   Lab Results   Component Value Date    LABBLOO No Growth to date 01/15/2017    LABBLOO No Growth to date 01/15/2017    LABBLOO No Growth to date 01/15/2017    LABBLOO No Growth to date 01/15/2017    LABBLOO No growth after 5 days. 09/19/2015    Lab Results   Component Value Date    LABURIN No growth 01/15/2017    LABURIN  09/18/2015     STAPHYLOCOCCUS EPIDERMIDIS  10,000 - 49,999 cfu/ml      LABURIN ESCHERICHIA COLI  >100,000 cfu/ml   09/05/2015        Medications:  Scheduled Meds:   clotrimazole  10 mg Oral TID    folic acid  1,000 mcg Oral Daily    lactulose  20 g Oral Q3H    levetiracetam  500 mg Oral BID    lithium  300 mg Oral Daily     pantoprazole  40 mg Oral Daily    rifAXIMimin  550 mg Oral BID     Continuous Infusions:   PRN Meds:dextrose 50%, dextrose 50%, glucagon (human recombinant), glucose, glucose, ondansetron

## 2017-01-17 NOTE — PT/OT/SLP PROGRESS
Speech Language Pathology  Treatment    Marely Hamilton   MRN: [id number]   Admitting Diagnosis: Hepatic encephalopathy    Diet recommendations: Solid Diet Level: Regular  Liquid Diet Level: Thin Feed only when awake/alert, HOB to 90 degrees, Small bites/sips, Alternating bites/sips and 1 bite/sip at a time    SLP Treatment Date: 17  Speech Start Time: 1323     Speech Stop Time: 1335     Speech Total (min): 12 min       TREATMENT BILLABLE MINUTES:  Treatment Swallowing Dysfunction 12    Has the patient been evaluated by SLP for swallowing? : Yes  Keep patient NPO?: No   General Precautions: Standard, aspiration, fall          Subjective:  Awake yet lethargic    Pain Ratin/10  Pain Rating Post-Intervention: 0/10    Objective:     Pt repositioned upright for PO trials and aroused with verbal cues. She tolerated peaches x2 and tea via straw x6 without overt s/s of aspiration. NSG reports she was unable to tolerate medication this am 2/2 lethargy. SLP reviewed swallow precautions with pt. Educated NSG to crush meds in puree when pt lethargic. Continue regular diet/thin liquids.     Assessment:  Marely Hamilton is a 51 y.o. female with a medical diagnosis of Hepatic encephalopathy and presents with Dysphagia.      Goals:   SLP Goals        Problem: SLP Goal    Goal Priority Disciplines Outcome   SLP Goal     SLP    Description:  Speech Language Pathology Goals  Goals expected to be met by     1. Pt will tolerate regular diet/thin liquids without overt s/s of aspiration                   Plan:   Patient to be seen Therapy Frequency: 5 x/week   Plan of Care expires: 17  Plan of Care reviewed with: patient  SLP Follow-up?: Yes              Mayra Olivier CCC-SLP   Speech Language Pathologist  Pager [phone]  2017

## 2017-01-17 NOTE — ASSESSMENT & PLAN NOTE
- Cirrhosis secondary to alcohol abuse  - Review principal problem for more elaboration   - Will place her Ceftriaxone 1 gm BID.  - No fluid to be tapped in her ascitic fluid  - Consult Hepatology and they recommend to continue current plan (increase Lactulose and rifaximin)

## 2017-01-17 NOTE — PROGRESS NOTES
"Hepatology  Progress note      SUBJECTIVE:     Reason for Consult: ESLD    Initial HPI:  Patient is a 51 y.o. 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    No family presently at bedside, however the mother had reported that she had noticed a progressive decline in mental function over the past week.  She was unsure if the patient was taking her lactulose as prescribed.  No reported ETOH use, however the mother was unsure of this.  No known fevers, chills, rigors.      The patient was previously worked up for liver transplant at [facility] and was in workup for OLT here at [facility] last Spring, however she stopped her evaluation because she said her [facility] physician told her she "didn't need a transplant."  No further follow up since March, 2016.    Interval Hx: Patient with deteriorating mental status.  Now only opens eyes to voice.  Does not follow commands, nonverbal.         OBJECTIVE:     Vital Signs (Most Recent)  Temp: 97 °F (36.1 °C) (01/17/17 1202)  Pulse: 82 (01/17/17 1202)  Resp: 18 (01/17/17 1202)  BP: (!) 163/93 (01/17/17 1202)  SpO2: 97 % (01/17/17 1202)    Physical Exam:  General appearance: no acute distress; somnolent  Eyes: scleral icterus  Lungs: breath sounds vesicular in nature, air entry equal bilaterally,   Heart: regular rate and rhythm, S1, S2 clearly audible,  Abdomen: soft, non-tender, non-distended;  no rebound tenderness, rigidity, or guarding  Extremities: clubbing bilaterally  Pulses: 2+ and symmetric  Skin: Skin color, texture, turgor normal.   Neurologic: Nonverbal, somnolent      Laboratory    CBC:     Recent Labs  Lab 01/15/17  1406 01/16/17  0438 01/17/17  0620   WBC 12.34 8.16 5.19   RBC 3.34* 3.02* 3.08*   HGB 10.7* 10.0* 10.0*   HCT 31.4* 28.9* 29.4*   PLT 70* 58* 65*   MCV 94 96 96   MCH 32.0* 33.1* 32.5*   MCHC 34.1 34.6 34.0     BMP:   Recent Labs  Lab 01/15/17  1406  01/16/17  0438 " 01/17/17  0620     --  93 113*     --  140 142   K 3.6  --  3.5 4.2     --  111* 113*   CO2 24  --  23 22*   BUN 16  --  12 9   CREATININE 0.7  --  0.6 0.6   CALCIUM 9.9  --  8.9 9.0   MG  --   < > 1.7 1.8   < > = values in this interval not displayed.  Coagulation:     Recent Labs  Lab 01/17/17  0620   INR 1.8*           ASSESSMENT/PLAN:     ESLD 2/2 ETOH presenting with hepatic encephalopathy likely 2/2 medication noncompliance.  Patient has no obvious ascites for drainage by U/S, her UA was not consistent with infection.  She has no white count and no fevers.        Recommendations:     Given worsening mental status and normal Ammonia at presentation, would recommend rechecking Ammonia, and if not significantly elevated, then pursuing other causes of AMS (CT head, ?EEG, etc.)    Check PETH  Continue lactulose with dose titrated to have 3-4 liquid BM's per day  Can start Rifaximin 500mg BID  Check daily CMP    Will revisit the idea of potential liver transplant evaluation with patient and mother when patient's mental status has improved (patient previously declined evaluation).      Gagandeep Nunez   Gastroenterology Fellow PGY V  [phone]

## 2017-01-18 LAB
ALBUMIN SERPL BCP-MCNC: 3.4 G/DL
ALP SERPL-CCNC: 124 U/L
ALT SERPL W/O P-5'-P-CCNC: 38 U/L
ANION GAP SERPL CALC-SCNC: 10 MMOL/L
AST SERPL-CCNC: 61 U/L
BILIRUB SERPL-MCNC: 9.3 MG/DL
BUN SERPL-MCNC: 7 MG/DL
CALCIUM SERPL-MCNC: 9.2 MG/DL
CHLORIDE SERPL-SCNC: 113 MMOL/L
CO2 SERPL-SCNC: 21 MMOL/L
CREAT SERPL-MCNC: 0.8 MG/DL
EST. GFR  (AFRICAN AMERICAN): >60 ML/MIN/1.73 M^2
EST. GFR  (NON AFRICAN AMERICAN): >60 ML/MIN/1.73 M^2
GLUCOSE SERPL-MCNC: 109 MG/DL
INR PPP: 1.7
MAGNESIUM SERPL-MCNC: 1.7 MG/DL
POTASSIUM SERPL-SCNC: 3.4 MMOL/L
PROT SERPL-MCNC: 7.2 G/DL
PROTHROMBIN TIME: 16.9 SEC
SODIUM SERPL-SCNC: 144 MMOL/L

## 2017-01-18 PROCEDURE — 63600175 PHARM REV CODE 636 W HCPCS: Performed by: STUDENT IN AN ORGANIZED HEALTH CARE EDUCATION/TRAINING PROGRAM

## 2017-01-18 PROCEDURE — 85610 PROTHROMBIN TIME: CPT

## 2017-01-18 PROCEDURE — 92526 ORAL FUNCTION THERAPY: CPT

## 2017-01-18 PROCEDURE — 25000003 PHARM REV CODE 250: Performed by: STUDENT IN AN ORGANIZED HEALTH CARE EDUCATION/TRAINING PROGRAM

## 2017-01-18 PROCEDURE — 83735 ASSAY OF MAGNESIUM: CPT

## 2017-01-18 PROCEDURE — 25000003 PHARM REV CODE 250: Performed by: INTERNAL MEDICINE

## 2017-01-18 PROCEDURE — 11000001 HC ACUTE MED/SURG PRIVATE ROOM

## 2017-01-18 PROCEDURE — 36415 COLL VENOUS BLD VENIPUNCTURE: CPT

## 2017-01-18 PROCEDURE — 99233 SBSQ HOSP IP/OBS HIGH 50: CPT | Mod: GC,,, | Performed by: HOSPITALIST

## 2017-01-18 PROCEDURE — 80053 COMPREHEN METABOLIC PANEL: CPT

## 2017-01-18 PROCEDURE — 99232 SBSQ HOSP IP/OBS MODERATE 35: CPT | Mod: GC,,, | Performed by: INTERNAL MEDICINE

## 2017-01-18 RX ORDER — THIAMINE HCL 100 MG
100 TABLET ORAL DAILY
Status: DISCONTINUED | OUTPATIENT
Start: 2017-01-18 | End: 2017-01-25

## 2017-01-18 RX ORDER — ZINC SULFATE 50(220)MG
220 CAPSULE ORAL DAILY
Status: DISCONTINUED | OUTPATIENT
Start: 2017-01-18 | End: 2017-01-21

## 2017-01-18 RX ORDER — RAMELTEON 8 MG/1
8 TABLET ORAL NIGHTLY PRN
Status: DISCONTINUED | OUTPATIENT
Start: 2017-01-18 | End: 2017-01-26 | Stop reason: HOSPADM

## 2017-01-18 RX ORDER — ENOXAPARIN SODIUM 100 MG/ML
40 INJECTION SUBCUTANEOUS EVERY 24 HOURS
Status: DISCONTINUED | OUTPATIENT
Start: 2017-01-18 | End: 2017-01-20

## 2017-01-18 RX ORDER — HYDROXYZINE HYDROCHLORIDE 25 MG/1
25 TABLET, FILM COATED ORAL 3 TIMES DAILY PRN
Status: DISCONTINUED | OUTPATIENT
Start: 2017-01-18 | End: 2017-01-21

## 2017-01-18 RX ADMIN — LACTULOSE 200 G: 10 SOLUTION ORAL at 05:01

## 2017-01-18 RX ADMIN — CLOTRIMAZOLE 10 MG: 10 LOZENGE ORAL at 05:01

## 2017-01-18 RX ADMIN — ENOXAPARIN SODIUM 40 MG: 100 INJECTION SUBCUTANEOUS at 04:01

## 2017-01-18 RX ADMIN — LITHIUM CARBONATE 300 MG: 300 CAPSULE, GELATIN COATED ORAL at 08:01

## 2017-01-18 RX ADMIN — FOLIC ACID 1000 MCG: 1 TABLET ORAL at 08:01

## 2017-01-18 RX ADMIN — LACTULOSE 20 G: 10 SOLUTION ORAL at 02:01

## 2017-01-18 RX ADMIN — Medication 220 MG: at 02:01

## 2017-01-18 RX ADMIN — PANTOPRAZOLE SODIUM 40 MG: 40 TABLET, DELAYED RELEASE ORAL at 08:01

## 2017-01-18 RX ADMIN — LACTULOSE 20 G: 10 SOLUTION ORAL at 05:01

## 2017-01-18 RX ADMIN — Medication 100 MG: at 11:01

## 2017-01-18 RX ADMIN — RAMELTEON 8 MG: 8 TABLET, FILM COATED ORAL at 10:01

## 2017-01-18 RX ADMIN — LACTULOSE 20 G: 10 SOLUTION ORAL at 09:01

## 2017-01-18 RX ADMIN — RIFAXIMIN 550 MG: 550 TABLET ORAL at 09:01

## 2017-01-18 RX ADMIN — CLOTRIMAZOLE 10 MG: 10 LOZENGE ORAL at 09:01

## 2017-01-18 RX ADMIN — CLOTRIMAZOLE 10 MG: 10 LOZENGE ORAL at 06:01

## 2017-01-18 RX ADMIN — RIFAXIMIN 550 MG: 550 TABLET ORAL at 08:01

## 2017-01-18 RX ADMIN — LACTULOSE 20 G: 10 SOLUTION ORAL at 12:01

## 2017-01-18 RX ADMIN — LACTULOSE 20 G: 10 SOLUTION ORAL at 08:01

## 2017-01-18 NOTE — ASSESSMENT & PLAN NOTE
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

## 2017-01-18 NOTE — PLAN OF CARE
Problem: Patient Care Overview  Goal: Plan of Care Review  Outcome: Ongoing (interventions implemented as appropriate)  Patient remained free of falls thsi shift. Lactulose given with slight improvement in mental status. Skin remains free of breakdown

## 2017-01-18 NOTE — ASSESSMENT & PLAN NOTE
Recent Labs  Lab 01/15/17  1406 01/16/17  0438 01/17/17  0620   HGB 10.7* 10.0* 10.0*   HCT 31.4* 28.9* 29.4*   - epoetin eladio (PROCRIT) injection 40,000 Units

## 2017-01-18 NOTE — PT/OT/SLP PROGRESS
Speech Language Pathology  Treatment/Discharge Summary    Marely Hamilton   MRN: [id number]   Admitting Diagnosis: Hepatic encephalopathy    Diet recommendations: Solid Diet Level: Regular  Liquid Diet Level: Thin Feed only when awake/alert, HOB to 90 degrees, Small bites/sips, Alternating bites/sips and 1 bite/sip at a time, pt able to take meds whole when awake/alert, if pt lethargic crush meds in puree.     SLP Treatment Date: 17  Speech Start Time: 850     Speech Stop Time: 859     Speech Total (min): 9 min       TREATMENT BILLABLE MINUTES:  Treatment Swallowing Dysfunction 9    Has the patient been evaluated by SLP for swallowing? : Yes  Keep patient NPO?: No   General Precautions: Standard, aspiration, fall          Subjective:  Awake/alert    Pain Ratin/10  Pain Rating Post-Intervention: 0/10    Objective:     Pt upright in bed upon entry. Thin liquids tolerated x7 via straw without overt s/s of aspiration. Pt tolerated 3 bites of apple with adequate mastication and no overt s/s of airway compromise. NSG reports pt tolerated meds crushed without difficulty. Pt is able to tolerate meds whole one at a time when awake/alert, if lethargic crush meds. Continue regular diet/thin liquids at this time. No further ST recommended at this time, please re-consult if warranted.     Assessment:  Marely Hamilton is a 51 y.o. female with a medical diagnosis of Hepatic encephalopathy and presents with Dysphagia.      Goals:   SLP Goals        Problem: SLP Goal    Goal Priority Disciplines Outcome   SLP Goal     SLP    Description:  Speech Language Pathology Goals  Goals expected to be met by     1. Pt will tolerate regular diet/thin liquids without overt s/s of aspiration GOAL MET                    Plan:   Plan of Care reviewed with: patient  SLP Follow-up?: No              Mayra Olivier CCC-SLP   Speech Language Pathologist  Pager [phone]  2017

## 2017-01-18 NOTE — PROGRESS NOTES
"[facility] Hospital Medicine  Progress Note    Patient Name: Marely Hamilton  MRN: [id number]  Patient Class: IP- Inpatient   Admission Date: 1/15/2017  Length of Stay: 3 days  Attending Physician: Blessing Cherry MD  Primary Care Provider: Viktor Ross MD    [facility] Medicine Team: [facility] HOSP MED 5 Julia Collazo MD    Subjective:     Principal Problem:Hepatic encephalopathy    HPI:  This is Ms. Marely Hamilton, 51 year female known to have Liver Cirrhosis and ESLD secondary to alcohol abuse, Bipolar disorder she was brought to ED by her mother after she noticed that she was confused and her skin was discolor for the last couple of days.    When we evaluate the patient at bedside, she was by herself and not accompanied by anyone, patient was not good historian and she couldn't reply the question we were asking. So I called the mother Joy Oconnor [phone] and ask questions about current presentation. So based on mother conversation over the phone she  claimed that she was non complaint with her medication especially Lactulose and she did not keep track of her bowel movement on daily basis. Mother also mention that she was having some change in her basal mentation and when you ask her question and doesn't answer question and taking about her sister and cats and "doesn't make sense". Patient lives with her mother. Her mother noticed change in her face color and eyes color in the last couple of weeks more noticeably in the last couple of days. Mother denies if she observed any fever chills, or symptoms systemic infections and lethargy. No clear when she drinks the last alcohol drink.           Hospital Course:  01/18/17 - Patient improving on the PO lactulose    Interval History:     Over night, no acute issues. Patient's mentation much improved on PO lactulose. Will try to get PT/OT re-ordered to assess for placement options now that she is more cooperative.     Review of Systems "   Constitutional: Positive for fatigue. Negative for activity change, appetite change, chills, diaphoresis and fever.   HENT: Negative for congestion and dental problem.    Eyes: Negative for photophobia, pain, discharge and redness.   Respiratory: Negative for apnea, cough, choking and shortness of breath.    Cardiovascular: Negative for chest pain, palpitations and leg swelling.   Gastrointestinal: Negative for abdominal distention, abdominal pain, anal bleeding and blood in stool.   Genitourinary: Negative for difficulty urinating, dysuria, flank pain and frequency.   Musculoskeletal: Negative for back pain and gait problem.   Skin: Positive for color change and pallor.   Neurological: Negative for dizziness, facial asymmetry and headaches.   Hematological: Does not bruise/bleed easily.     Objective:     Vital Signs (Most Recent):  Temp: 98.6 °F (37 °C) (01/18/17 1143)  Pulse: 101 (01/18/17 1143)  Resp: 18 (01/18/17 1143)  BP: (!) 150/82 (01/18/17 1143)  SpO2: 97 % (01/18/17 1143) Vital Signs (24h Range):  Temp:  [98.1 °F (36.7 °C)-99.4 °F (37.4 °C)] 98.6 °F (37 °C)  Pulse:  [] 101  Resp:  [18] 18  SpO2:  [97 %-100 %] 97 %  BP: (150-163)/(70-85) 150/82     Weight: 49.4 kg (109 lb)  Body mass index is 19.31 kg/(m^2).  No intake or output data in the 24 hours ending 01/18/17 1516   Physical Exam   Constitutional: She appears well-developed. No distress.   HENT:   Sclerae of face and conjunctivae    Eyes: Right eye exhibits no discharge. Left eye exhibits no discharge. Scleral icterus is present.   Neck: Normal range of motion. Neck supple. No JVD present. No thyromegaly present.   Cardiovascular: Normal rate and regular rhythm.  Exam reveals no friction rub.    No murmur heard.  Pulmonary/Chest: Effort normal and breath sounds normal. No respiratory distress. She has no wheezes. She has no rales.   Abdominal: Soft. Bowel sounds are normal. She exhibits no distension. There is no tenderness.   Negative for  hepatic asterixis.    Neurological: No cranial nerve deficit. Coordination normal.   conscious but only oriented to person and place.    Skin: She is not diaphoretic.   Vitals reviewed.    Complete Blood Count Coagulation Profile       Recent Labs  Lab 01/15/17  1406 01/16/17 0438 01/17/17  0620   WBC 12.34 8.16 5.19   HGB 10.7* 10.0* 10.0*   HCT 31.4* 28.9* 29.4*   PLT 70* 58* 65*   MCV 94 96 96   RDW 16.0* 15.9* 16.0*      Recent Labs  Lab 01/16/17  0438 01/17/17  0620 01/18/17  0519   INR 1.8* 1.8* 1.7*        Basic Metabolic Panel  Liver Function Test and Lipid Profile     Recent Labs  Lab 01/16/17 0438 01/17/17  0620 01/18/17  0519    142 144   K 3.5 4.2 3.4*   * 113* 113*   CO2 23 22* 21*   BUN 12 9 7   CREATININE 0.6 0.6 0.8   CALCIUM 8.9 9.0 9.2   MG 1.7 1.8 1.7   PHOS  --  4.3  --       Recent Labs  Lab 01/16/17  0438 01/17/17  0620 01/18/17  0519   PROT 6.8 6.8 7.2   BILITOT 11.7* 9.5* 9.3*   ALKPHOS 111 107 124   ALT 34 34 38   AST 50* 56* 61*       Lab Results   Component Value Date    CHOL 175 06/15/2015    HDL 30 (L) 06/15/2015    LDLCALC 125.4 06/15/2015    TRIG 98 06/15/2015        POCT Glucose HbA1c   No results for input(s): POCTGLUCOSE in the last 168 hours. No results found for: HGBA1C     Cardiac Marker Echocardiography   No results for input(s): TROPONINI, BNP in the last 168 hours. EF   Date Value Ref Range Status   10/29/2015 65 55 - 65         Blood Culture  Urine Culture   Lab Results   Component Value Date    LABBLOO No Growth to date 01/15/2017    LABBLOO No Growth to date 01/15/2017    LABBLOO No Growth to date 01/15/2017    LABBLOO No Growth to date 01/15/2017    LABBLOO No Growth to date 01/15/2017    LABBLOO No Growth to date 01/15/2017    Lab Results   Component Value Date    LABURIN No growth 01/15/2017    LABURIN  09/18/2015     STAPHYLOCOCCUS EPIDERMIDIS  10,000 - 49,999 cfu/ml      LABURIN ESCHERICHIA COLI  >100,000 cfu/ml   09/05/2015        Medications:  Scheduled  Meds:   clotrimazole  10 mg Oral TID    folic acid  1,000 mcg Oral Daily    lactulose  20 g Oral Q3H    lithium  300 mg Oral Daily    pantoprazole  40 mg Oral Daily    rifAXIMimin  550 mg Oral BID    thiamine  100 mg Oral Daily    zinc sulfate  220 mg Oral Daily     Continuous Infusions:   PRN Meds:dextrose 50%, dextrose 50%, glucagon (human recombinant), glucose, glucose, hydrOXYzine HCl, ondansetron, ramelteon       Assessment/Plan:      * Hepatic encephalopathy  - Non compliance history of her medication  - Ammonia at presentation was 50   - No clear etiology, differential diagnosis include but not limited to Hepatic Encephalopathy, medication overdose, deterioration of her ESLD, metabolic etiologies or infectious process.   - Delirium precaution :    Daytime: awake, up in chair as tolerated, tv on, window shades up, frequent reorientation.   Nighttime: in bed, minimize interruptions, tv off, window shades down.  - Resumed lactulose and aim for bowel movement 3-4 times bowel movements   - Consulted hepatology and they agreed on current plan and will disucss with patient when she is more clear and comprehensive.  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      Bipolar disorder in remission  - Hospitalized for crystal and psychosis x 2; also has had depression (total length 4) (duration 3 wks and 1.5 weeks with the others)  - On lithium daily and follow with her psychiatric  - Lithium level is normal  Resumed Lithium       Anemia    Recent Labs  Lab 01/15/17  1406 01/16/17  0438 01/17/17  0620   HGB 10.7* 10.0* 10.0*   HCT 31.4* 28.9* 29.4*   - epoetin eladio (PROCRIT) injection 40,000 Units      Chronic liver failure  - Review principal problem for more elaboration     Malnutrition  - Will consult dietary for their input.       Alcohol dependence in remission  - No clear history of alcohol use  - Alcohol level is < 10     VTE Risk Mitigation         Ordered     enoxaparin injection 40 mg  Daily     Route:  Subcutaneous        01/18/17 1518     Medium Risk of VTE  Once      01/15/17 1926          Julia Collazo MD  Department of Hospital Medicine   [facility]

## 2017-01-18 NOTE — ASSESSMENT & PLAN NOTE
- Could be secondary to her chronic liver disease and hypersplenism  - Restarted dvt ppx as her platelets are above 50.

## 2017-01-18 NOTE — PLAN OF CARE
CM in rounding with IM5 this am.  Pt looks better sitting up in bed awake and alert but still some disorientation to time and not answering some questions appropriately, wanders with ideas.  Pt continues to be jaundiced.  Pt with some complaints of pain in various places on her body.  Plan continues to follow with Liver Service for disposition.  Will continue to follow.

## 2017-01-18 NOTE — NURSING
Pt awake and alert since 2100.  Pt has been oriented but has difficulty following commands.  Also noted to have some flight of idea and other thought disruptions.

## 2017-01-18 NOTE — PHARMACY MED REC
"Admission Medication Reconciliation - Pharmacy Consult Note    The home medication history was taken by Daily Payan.  Based on information gathered and subsequent review by the clinical pharmacist, the items below may need attention.     You may go to "Admission" then "Reconcile Home Medications" tabs to review and/or act upon these items. Based on information gathered and subsequent review by the clinical pharmacist, the items below may need attention.    Potentially problematic discrepancies with current MAR: See Chart Below      Please address this information as you see fit.  Feel free to contact us if you have any questions or require assistance.    Brenton Mcgraw, PharmD  [id number]        Patient's prior to admission medication regimen was as follows:    Medication Sig     alprazolam (XANAX) 2 MG Tab Take 2 mg by mouth every evening. Consider not restarting; undergoes hepatic metabolism    brimonidine-timolol (COMBIGAN) 0.2-0.5 % Drop Place 1 drop into both eyes 3 (three) times daily.  Currently not ordered    CLONAZEPAM ORAL Take 2 mg by mouth every evening.  Recommend not restarting; extensive accumulation in setting of ESLD    megestrol (MEGACE) 20 MG Tab Take 1 tablet (20 mg total) by mouth once daily. (Patient taking differently: Take 20 mg by mouth daily as needed (for appetite). ) Currently not ordered; was taking for appetite; consider alternative    pilocarpine HCL 1% (PILOCAR) 1 % ophthalmic solution Place 1 drop into both eyes 3 (three) times daily.  Currently not ordered    temazepam (RESTORIL) 30 mg capsule Take 30 mg by mouth nightly as needed for Insomnia. Consider not restarting; undergoes hepatic metabolism         Please add appropriate    SmartPhrase below:        "

## 2017-01-18 NOTE — PROCEDURES
DATE OF SERVICE:  01/17/2017    ELECTROENCEPHALOGRAM REPORT    METHODOLOGY:  Electroencephalographic (EEG) recording is recorded with   electrodes placed according to the International 10-20 placement system.  Thirty   two (32) channels of digital signal (sampling rate of 512/sec), including T1   and T2, were simultaneously recorded from the scalp and may include EKG, EMG,   and/or eye monitors.  Recording band pass was 0.1 to 512 Hz.  Digital video   recording of the patient is simultaneously recorded with the EEG.  The patient   is instructed to report clinical symptoms which may occur during the recording   session.  EEG and video recording are stored and archived in digital format.    Activation procedures, which include photic stimulation, hyperventilation and   instructing patients to perform simple tasks, are done in selected patients.    The EEG is displayed on a monitor screen and can be reviewed using different   montages.  Computer assisted-analysis is employed to detect spike and   electrographic seizure activity.  The entire record is submitted for computer   analysis.  The entire recording is visually reviewed, and the times identified   by computer analysis as being spikes or seizures are reviewed again.    Compressed spectral analysis (CSA) is also performed on the activity recorded   from each individual channel.  This is displayed as a power display of   frequencies from 0 to 30 Hz over time.  The CSA is reviewed looking for   asymmetries in power between homologous areas of the scalp, then compared with   the original EEG recording.    [employer] software was also utilized in the review of this study.  This software   suite analyzes the EEG recording in multiple domains.  Coherence and rhythmicity   are computed to identify EEG sections which may contain organized seizures.    Each channel undergoes analysis to detect the presence of spike and sharp waves   which have special and morphological  characteristics of epileptic activity.  The   routine EEG recording is converted from special into frequency domain.  This is   then displayed comparing homologous areas to identify areas of significant   asymmetry.  Algorithm to identify non-cortically generated artifact is used to   separate artifact from the EEG.    FINDINGS:  At the onset of this study, the patient appears to be asleep, and the   EEG reveals low amplitude activity with some central spindles and occasional K   complexes.  The accompanying video reveals the patient in a hospital bed,   seemingly asleep.    The patient appears to arouse midway through the study, after which time, EEG   begins reveal some faster activity including alpha and beta activity and   snippets of a posterior dominant rhythm.  The patient remains drowsy and drifts   in and out of sleep.  EEG does reveal bitemporal rhythmic theta activity at   times, which is somewhat sharp and is of unclear significance (this may   represent a normal variant rhythmic mid temporal theta of drowsiness or it may   represent an abnormality, difficult to discern from this brief sample).    The remainder of the study, the patient drifts in and out of sleep and no   further findings are seen.  There are no electrographic seizures or clinical   events.  There are no clear focal epileptiform disturbances other than the   indeterminate pattern mentioned above.    INTERPRETATION:  Probably normal asleep and drowsy EEG with rhythmic mid   temporal theta activity, which may signify drowsiness.  If suspicion for an   epileptiform disturbance is high, consider repeat or prolonged EEG monitoring in   the future.      BILL/GEOFF  dd: 01/17/2017 15:04:11 (CST)  td: 01/17/2017 22:05:53 (CST)  Doc ID   #[id number]  Job ID #[id number]    CC:

## 2017-01-18 NOTE — SUBJECTIVE & OBJECTIVE
Interval History:     Over night, no acute issues. Patient's mentation much improved on PO lactulose. Will try to get PT/OT re-ordered to assess for placement options now that she is more cooperative.     Review of Systems   Constitutional: Positive for fatigue. Negative for activity change, appetite change, chills, diaphoresis and fever.   HENT: Negative for congestion and dental problem.    Eyes: Negative for photophobia, pain, discharge and redness.   Respiratory: Negative for apnea, cough, choking and shortness of breath.    Cardiovascular: Negative for chest pain, palpitations and leg swelling.   Gastrointestinal: Negative for abdominal distention, abdominal pain, anal bleeding and blood in stool.   Genitourinary: Negative for difficulty urinating, dysuria, flank pain and frequency.   Musculoskeletal: Negative for back pain and gait problem.   Skin: Positive for color change and pallor.   Neurological: Negative for dizziness, facial asymmetry and headaches.   Hematological: Does not bruise/bleed easily.     Objective:     Vital Signs (Most Recent):  Temp: 98.6 °F (37 °C) (01/18/17 1143)  Pulse: 101 (01/18/17 1143)  Resp: 18 (01/18/17 1143)  BP: (!) 150/82 (01/18/17 1143)  SpO2: 97 % (01/18/17 1143) Vital Signs (24h Range):  Temp:  [98.1 °F (36.7 °C)-99.4 °F (37.4 °C)] 98.6 °F (37 °C)  Pulse:  [] 101  Resp:  [18] 18  SpO2:  [97 %-100 %] 97 %  BP: (150-163)/(70-85) 150/82     Weight: 49.4 kg (109 lb)  Body mass index is 19.31 kg/(m^2).  No intake or output data in the 24 hours ending 01/18/17 1516   Physical Exam   Constitutional: She appears well-developed. No distress.   HENT:   Sclerae of face and conjunctivae    Eyes: Right eye exhibits no discharge. Left eye exhibits no discharge. Scleral icterus is present.   Neck: Normal range of motion. Neck supple. No JVD present. No thyromegaly present.   Cardiovascular: Normal rate and regular rhythm.  Exam reveals no friction rub.    No murmur  heard.  Pulmonary/Chest: Effort normal and breath sounds normal. No respiratory distress. She has no wheezes. She has no rales.   Abdominal: Soft. Bowel sounds are normal. She exhibits no distension. There is no tenderness.   Negative for hepatic asterixis.    Neurological: No cranial nerve deficit. Coordination normal.   conscious but only oriented to person and place.    Skin: She is not diaphoretic.   Vitals reviewed.    Complete Blood Count Coagulation Profile       Recent Labs  Lab 01/15/17  1406 01/16/17  0438 01/17/17  0620   WBC 12.34 8.16 5.19   HGB 10.7* 10.0* 10.0*   HCT 31.4* 28.9* 29.4*   PLT 70* 58* 65*   MCV 94 96 96   RDW 16.0* 15.9* 16.0*      Recent Labs  Lab 01/16/17  0438 01/17/17  0620 01/18/17  0519   INR 1.8* 1.8* 1.7*        Basic Metabolic Panel  Liver Function Test and Lipid Profile     Recent Labs  Lab 01/16/17  0438 01/17/17  0620 01/18/17  0519    142 144   K 3.5 4.2 3.4*   * 113* 113*   CO2 23 22* 21*   BUN 12 9 7   CREATININE 0.6 0.6 0.8   CALCIUM 8.9 9.0 9.2   MG 1.7 1.8 1.7   PHOS  --  4.3  --       Recent Labs  Lab 01/16/17  0438 01/17/17  0620 01/18/17  0519   PROT 6.8 6.8 7.2   BILITOT 11.7* 9.5* 9.3*   ALKPHOS 111 107 124   ALT 34 34 38   AST 50* 56* 61*       Lab Results   Component Value Date    CHOL 175 06/15/2015    HDL 30 (L) 06/15/2015    LDLCALC 125.4 06/15/2015    TRIG 98 06/15/2015        POCT Glucose HbA1c   No results for input(s): POCTGLUCOSE in the last 168 hours. No results found for: HGBA1C     Cardiac Marker Echocardiography   No results for input(s): TROPONINI, BNP in the last 168 hours. EF   Date Value Ref Range Status   10/29/2015 65 55 - 65         Blood Culture  Urine Culture   Lab Results   Component Value Date    LABBLOO No Growth to date 01/15/2017    LABBLOO No Growth to date 01/15/2017    LABBLOO No Growth to date 01/15/2017    LABBLOO No Growth to date 01/15/2017    LABBLOO No Growth to date 01/15/2017    LABBLOO No Growth to date 01/15/2017     Lab Results   Component Value Date    LABURIN No growth 01/15/2017    LABURIN  09/18/2015     STAPHYLOCOCCUS EPIDERMIDIS  10,000 - 49,999 cfu/ml      LABURIN ESCHERICHIA COLI  >100,000 cfu/ml   09/05/2015        Medications:  Scheduled Meds:   clotrimazole  10 mg Oral TID    folic acid  1,000 mcg Oral Daily    lactulose  20 g Oral Q3H    lithium  300 mg Oral Daily    pantoprazole  40 mg Oral Daily    rifAXIMimin  550 mg Oral BID    thiamine  100 mg Oral Daily    zinc sulfate  220 mg Oral Daily     Continuous Infusions:   PRN Meds:dextrose 50%, dextrose 50%, glucagon (human recombinant), glucose, glucose, hydrOXYzine HCl, ondansetron, ramelteon

## 2017-01-18 NOTE — MEDICAL/APP STUDENT
"[facility] Hospital Medicine  Progress Note    Patient Name: Marely Hamilton  MRN: [id number]  Patient Class: IP- Inpatient   Admission Date: 1/15/2017  Length of Stay: 3 days  Attending Physician: Blessing Cherry MD  Primary Care Provider: Viktor Ross MD    [facility] Medicine Team: [facility] MED 5 Corrie Rogers    Subjective:     Principal Problem:Hepatic encephalopathy    HPI: Ms. Marely Hamilton, 51 year female known to have Liver Cirrhosis and ESLD secondary to alcohol abuse, Bipolar disorder she was brought to ED by her mother after she noticed that she was confused and her skin was discolored for the last couple of days.      Based on a phone conversation with the patient's mother the patient was non complaint with her medication especially Lactulose and she did not keep track of her bowel movement on daily basis. Mother also mention that she was having some change in her basal mentation, for example she doesn't answer question and when talking about her sister and cats she "doesn't make sense". Patient lives with her mother. Her mother noticed change in her face color and eyes color in the last couple of weeks more noticeably in the last couple of days. Mother denies if she observed any fever chills, or symptoms systemic infections and lethargy. Not clear when patient had last alcohol drink.     Interval History:     Principal Problem:Hepatic encephalopathy      -This morning patient was alert and able to answer most questions with some confabulations and tangential speech.  -2 overnight bowel movements reported.   -Jaundice is clearing up.   -Pt reported light tenderness in lower abdomen (1/10)   - Pt reported upper back pain. Reduced breath sounds in left lower lung field noted, Xray ordered.    Review of Systems   Gastrointestinal: Positive for abdominal pain.   Musculoskeletal: Positive for back pain.   Skin: Positive for color change.   Psychiatric/Behavioral: Positive for " confusion.     Objective:     Vital Signs (Most Recent):  Temp: 98.6 °F (37 °C) (01/18/17 1143)  Pulse: 101 (01/18/17 1143)  Resp: 18 (01/18/17 1143)  BP: (!) 150/82 (01/18/17 1143)  SpO2: 97 % (01/18/17 1143) Vital Signs (24h Range):  Temp:  [98.1 °F (36.7 °C)-99.4 °F (37.4 °C)] 98.6 °F (37 °C)  Pulse:  [] 101  Resp:  [18] 18  SpO2:  [97 %-100 %] 97 %  BP: (150-163)/(70-85) 150/82     Weight: 49.4 kg (109 lb)  Body mass index is 19.31 kg/(m^2).  No intake or output data in the 24 hours ending 01/18/17 1417   Physical Exam   Constitutional: She appears well-developed.   Eyes: Scleral icterus is present.   Cardiovascular: Normal rate, regular rhythm, normal heart sounds and intact distal pulses.    Pulmonary/Chest: Effort normal. She has decreased breath sounds in the left lower field. She has no wheezes. She has no rhonchi. She has no rales.   Abdominal: There is tenderness.   Neurological: She is alert.   Skin: Nails show clubbing.   Psychiatric: Her speech is delayed and tangential.       Significant Labs: All pertinent labs within the past 24 hours have been reviewed.      Assessment/Plan:      Active Diagnoses:    Diagnosis Date Noted POA    PRINCIPAL PROBLEM:  Hepatic encephalopathy [K72.90] 10/11/2015 Yes    Alcohol dependence in remission [F10.21]  Yes    Malnutrition [E46] 10/11/2015 Yes    Chronic liver failure [K72.11] 10/11/2015 Yes    Anemia [D64.9] 09/18/2015 Yes    Cirrhosis, Laennec's [K70.30] 06/15/2015 Yes    Thrombocytopenia [D69.6] 06/15/2015 Yes    Bipolar disorder in remission [F31.70] 06/15/2015 Yes      Problems Resolved During this Admission:    Diagnosis Date Noted Date Resolved POA     VTE Risk Mitigation         Ordered     Medium Risk of VTE  Once      01/15/17 1926        Ms Hamilton, a 51 y.o. Female admitted from ED for altered mental status and jaundice with a history of ESLD and bipolar disorder.      Principal Problem - Hepatic encephalopathy:  - Continue lactulose  medication, if necessary parenteral. Talked to pt about the importance of taking lactulose.  - EEG to exclude absence seizure determined no seizures or adverse events.      Alcohol Dependence  - in remission, confirmed with negative Peth test.      Malnutrition  - continue regular diet for now     Chronic Liver Failure  - MELD Score = 21 as of 1/18/17 with a bilirubin of 9.3  - Hepatitis and HIV panel are negative.   - discuss possible liver transplant after current episode is resolved     Anemia:   - Hemoglobin has been stable at 10, hematocrit 29.4.  - continue epoetin     Cirrhosis  - Liver Cirrhosis confirmed with CT Scan on 1/15/17.   - discuss possible liver transplant after current episode is resolved     Thrombocytopenia  - Currently no treatment planned     Bipolar disorder   - Patient has been suffering from Bipolar Disorder since her late teens and it is controlled with medication.   - Continue lithium treatment        Corrie Rogers  Department of Hospital Medicine   [facility]

## 2017-01-18 NOTE — ASSESSMENT & PLAN NOTE
- Non compliance history of her medication  - Ammonia at presentation was 50   - No clear etiology, differential diagnosis include but not limited to Hepatic Encephalopathy, medication overdose, deterioration of her ESLD, metabolic etiologies or infectious process.   - Delirium precaution :    Daytime: awake, up in chair as tolerated, tv on, window shades up, frequent reorientation.   Nighttime: in bed, minimize interruptions, tv off, window shades down.  - Resumed lactulose and aim for bowel movement 3-4 times bowel movements   - Consulted hepatology and they agreed on current plan and will disucss with patient when she is more clear and comprehensive.  - Hepatitis panel and HIV negative  - PETH pending  - If PETH is negative, then patient continues to have decompensated liver disease despite appropriate medical therapy and abstinence    MELD-Na score: 21 at 1/18/2017  5:19 AM  MELD score: 21 at 1/18/2017  5:19 AM  Calculated from:  Serum Creatinine: 0.8 mg/dL (Rounded to 1) at 1/18/2017  5:19 AM  Serum Sodium: 144 mmol/L (Rounded to 137) at 1/18/2017  5:19 AM  Total Bilirubin: 9.3 mg/dL at 1/18/2017  5:19 AM  INR(ratio): 1.7 at 1/18/2017  5:19 AM  Age: 51 years

## 2017-01-18 NOTE — PROGRESS NOTES
"Hepatology  Progress note      SUBJECTIVE:     Reason for Consult: ESLD    Initial HPI:  Patient is a 51 y.o. 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    No family presently at bedside, however the mother had reported that she had noticed a progressive decline in mental function over the past week.  She was unsure if the patient was taking her lactulose as prescribed.  No reported ETOH use, however the mother was unsure of this.  No known fevers, chills, rigors.      The patient was previously worked up for liver transplant at [facility] and was in workup for OLT here at [facility] last Spring, however she stopped her evaluation because she said her [facility] physician told her she "didn't need a transplant."  No further follow up since March, 2016.    Interval Hx: Improved mental status however still encephalopathic, nonsensical speech.         OBJECTIVE:     Vital Signs (Most Recent)  Temp: 98.6 °F (37 °C) (01/18/17 1143)  Pulse: 101 (01/18/17 1143)  Resp: 18 (01/18/17 1143)  BP: (!) 150/82 (01/18/17 1143)  SpO2: 97 % (01/18/17 1143)    Physical Exam:  General appearance: no acute distress;   Eyes: scleral icterus  Lungs: breath sounds vesicular in nature, air entry equal bilaterally,   Heart: regular rate and rhythm, S1, S2 clearly audible,  Abdomen: soft, non-tender, non-distended;  no rebound tenderness, rigidity, or guarding  Extremities: clubbing bilaterally  Pulses: 2+ and symmetric  Skin: Skin color, texture, turgor normal.   Neurologic: Encephalopathic    Laboratory    CBC:     Recent Labs  Lab 01/15/17  1406 01/16/17  0438 01/17/17  0620   WBC 12.34 8.16 5.19   RBC 3.34* 3.02* 3.08*   HGB 10.7* 10.0* 10.0*   HCT 31.4* 28.9* 29.4*   PLT 70* 58* 65*   MCV 94 96 96   MCH 32.0* 33.1* 32.5*   MCHC 34.1 34.6 34.0     BMP:     Recent Labs  Lab 01/16/17  0438 01/17/17  0620 01/18/17  0519   GLU 93 113* 109    142 144   K " 3.5 4.2 3.4*   * 113* 113*   CO2 23 22* 21*   BUN 12 9 7   CREATININE 0.6 0.6 0.8   CALCIUM 8.9 9.0 9.2   MG 1.7 1.8 1.7     Coagulation:     Recent Labs  Lab 01/18/17  0519   INR 1.7*           ASSESSMENT/PLAN:     ESLD 2/2 ETOH presenting with hepatic encephalopathy likely 2/2 medication noncompliance.  Patient has no obvious ascites for drainage by U/S, her UA was not consistent with infection.  She has no white count and no fevers.        Recommendations:     Add Zinc Sulfate to regimen for HE    Await PETH  Continue lactulose with dose titrated to have 3-4 liquid BM's per day  Continue Rifaximin 500mg BID  Check daily CMP    Will revisit the idea of potential liver transplant evaluation with patient and mother when patient's mental status has improved (patient previously declined evaluation).      Gagandeep Nunez   Gastroenterology Fellow PGY V  [phone]

## 2017-01-19 LAB
ALBUMIN SERPL BCP-MCNC: 3.4 G/DL
ALP SERPL-CCNC: 153 U/L
ALT SERPL W/O P-5'-P-CCNC: 39 U/L
ANION GAP SERPL CALC-SCNC: 11 MMOL/L
AST SERPL-CCNC: 70 U/L
BASOPHILS # BLD AUTO: 0.02 K/UL
BASOPHILS NFR BLD: 0.3 %
BILIRUB SERPL-MCNC: 9.4 MG/DL
BUN SERPL-MCNC: 5 MG/DL
CALCIUM SERPL-MCNC: 9.3 MG/DL
CHLORIDE SERPL-SCNC: 108 MMOL/L
CO2 SERPL-SCNC: 20 MMOL/L
CREAT SERPL-MCNC: 0.7 MG/DL
DIFFERENTIAL METHOD: ABNORMAL
EOSINOPHIL # BLD AUTO: 0.2 K/UL
EOSINOPHIL NFR BLD: 3.1 %
ERYTHROCYTE [DISTWIDTH] IN BLOOD BY AUTOMATED COUNT: 16.3 %
EST. GFR  (AFRICAN AMERICAN): >60 ML/MIN/1.73 M^2
EST. GFR  (NON AFRICAN AMERICAN): >60 ML/MIN/1.73 M^2
GLUCOSE SERPL-MCNC: 162 MG/DL
HCT VFR BLD AUTO: 31.3 %
HGB BLD-MCNC: 10.9 G/DL
INR PPP: 1.9
LYMPHOCYTES # BLD AUTO: 0.7 K/UL
LYMPHOCYTES NFR BLD: 9.9 %
MAGNESIUM SERPL-MCNC: 1.5 MG/DL
MCH RBC QN AUTO: 32.3 PG
MCHC RBC AUTO-ENTMCNC: 34.8 %
MCV RBC AUTO: 93 FL
MONOCYTES # BLD AUTO: 0.7 K/UL
MONOCYTES NFR BLD: 10.2 %
NEUTROPHILS # BLD AUTO: 5.1 K/UL
NEUTROPHILS NFR BLD: 76.4 %
PHOSPHATIDYLETHANOL (PETH): NEGATIVE NG/ML
PLATELET # BLD AUTO: 57 K/UL
PMV BLD AUTO: 9 FL
POTASSIUM SERPL-SCNC: 3.4 MMOL/L
PROT SERPL-MCNC: 7.1 G/DL
PROTHROMBIN TIME: 18.9 SEC
RBC # BLD AUTO: 3.37 M/UL
SODIUM SERPL-SCNC: 139 MMOL/L
WBC # BLD AUTO: 6.74 K/UL

## 2017-01-19 PROCEDURE — 80053 COMPREHEN METABOLIC PANEL: CPT

## 2017-01-19 PROCEDURE — 83735 ASSAY OF MAGNESIUM: CPT

## 2017-01-19 PROCEDURE — 85610 PROTHROMBIN TIME: CPT

## 2017-01-19 PROCEDURE — 97803 MED NUTRITION INDIV SUBSEQ: CPT

## 2017-01-19 PROCEDURE — 97168 OT RE-EVAL EST PLAN CARE: CPT

## 2017-01-19 PROCEDURE — 11000001 HC ACUTE MED/SURG PRIVATE ROOM

## 2017-01-19 PROCEDURE — 25000003 PHARM REV CODE 250: Performed by: STUDENT IN AN ORGANIZED HEALTH CARE EDUCATION/TRAINING PROGRAM

## 2017-01-19 PROCEDURE — 99232 SBSQ HOSP IP/OBS MODERATE 35: CPT | Mod: GC,,, | Performed by: INTERNAL MEDICINE

## 2017-01-19 PROCEDURE — 97535 SELF CARE MNGMENT TRAINING: CPT

## 2017-01-19 PROCEDURE — 25000003 PHARM REV CODE 250: Performed by: INTERNAL MEDICINE

## 2017-01-19 PROCEDURE — 36415 COLL VENOUS BLD VENIPUNCTURE: CPT

## 2017-01-19 PROCEDURE — 85025 COMPLETE CBC W/AUTO DIFF WBC: CPT

## 2017-01-19 PROCEDURE — 97116 GAIT TRAINING THERAPY: CPT

## 2017-01-19 PROCEDURE — 99232 SBSQ HOSP IP/OBS MODERATE 35: CPT | Mod: GC,,, | Performed by: HOSPITALIST

## 2017-01-19 PROCEDURE — 63600175 PHARM REV CODE 636 W HCPCS: Performed by: STUDENT IN AN ORGANIZED HEALTH CARE EDUCATION/TRAINING PROGRAM

## 2017-01-19 PROCEDURE — 97530 THERAPEUTIC ACTIVITIES: CPT

## 2017-01-19 RX ORDER — MAGNESIUM SULFATE HEPTAHYDRATE 40 MG/ML
2 INJECTION, SOLUTION INTRAVENOUS ONCE
Status: COMPLETED | OUTPATIENT
Start: 2017-01-19 | End: 2017-01-19

## 2017-01-19 RX ORDER — POTASSIUM CHLORIDE 20 MEQ/1
40 TABLET, EXTENDED RELEASE ORAL ONCE
Status: COMPLETED | OUTPATIENT
Start: 2017-01-19 | End: 2017-01-19

## 2017-01-19 RX ADMIN — LACTULOSE 20 G: 10 SOLUTION ORAL at 02:01

## 2017-01-19 RX ADMIN — ENOXAPARIN SODIUM 40 MG: 100 INJECTION SUBCUTANEOUS at 11:01

## 2017-01-19 RX ADMIN — POTASSIUM CHLORIDE 40 MEQ: 1500 TABLET, EXTENDED RELEASE ORAL at 09:01

## 2017-01-19 RX ADMIN — LITHIUM CARBONATE 300 MG: 300 CAPSULE, GELATIN COATED ORAL at 09:01

## 2017-01-19 RX ADMIN — LACTULOSE 20 G: 10 SOLUTION ORAL at 09:01

## 2017-01-19 RX ADMIN — LACTULOSE 20 G: 10 SOLUTION ORAL at 06:01

## 2017-01-19 RX ADMIN — LACTULOSE 20 G: 10 SOLUTION ORAL at 05:01

## 2017-01-19 RX ADMIN — LACTULOSE 20 G: 10 SOLUTION ORAL at 03:01

## 2017-01-19 RX ADMIN — RIFAXIMIN 550 MG: 550 TABLET ORAL at 09:01

## 2017-01-19 RX ADMIN — MAGNESIUM SULFATE IN WATER 2 G: 40 INJECTION, SOLUTION INTRAVENOUS at 09:01

## 2017-01-19 RX ADMIN — Medication 100 MG: at 09:01

## 2017-01-19 RX ADMIN — CLOTRIMAZOLE 10 MG: 10 LOZENGE ORAL at 06:01

## 2017-01-19 RX ADMIN — CLOTRIMAZOLE 10 MG: 10 LOZENGE ORAL at 09:01

## 2017-01-19 RX ADMIN — CLOTRIMAZOLE 10 MG: 10 LOZENGE ORAL at 02:01

## 2017-01-19 RX ADMIN — LACTULOSE 20 G: 10 SOLUTION ORAL at 11:01

## 2017-01-19 RX ADMIN — LACTULOSE 20 G: 10 SOLUTION ORAL at 12:01

## 2017-01-19 RX ADMIN — Medication 220 MG: at 09:01

## 2017-01-19 RX ADMIN — PANTOPRAZOLE SODIUM 40 MG: 40 TABLET, DELAYED RELEASE ORAL at 09:01

## 2017-01-19 RX ADMIN — FOLIC ACID 1000 MCG: 1 TABLET ORAL at 09:01

## 2017-01-19 NOTE — NURSING
Pt was found out of bed in a confused state several times through the night. Pt was always orientated to name,  was able to read the board for the current date and that she was in an [facility]. Pt was confused as to why she was here.

## 2017-01-19 NOTE — PLAN OF CARE
Pt sitting up in bed awake and alert today still confused with time and why she is in the hospital.  Pt re-oriented per floor RN and meds given.  Pt still not medically stable for discharge, will continue to follow.

## 2017-01-19 NOTE — ASSESSMENT & PLAN NOTE
- Could be secondary to her chronic liver disease and hypersplenism  - Restarted dvt ppx as her platelets are above 50

## 2017-01-19 NOTE — SUBJECTIVE & OBJECTIVE
Interval History:     Over night, her mentation improved compared to admission. Still having .... . Had three - four bowel movement in the last 24 hours. Hepatology on board for possible transplant workup. Re ordered PT OT for appropriate placement.     Review of Systems   Constitutional: Positive for fatigue. Negative for activity change, appetite change, chills, diaphoresis and fever.   HENT: Negative for congestion and dental problem.    Eyes: Negative for photophobia, pain, discharge and redness.   Respiratory: Negative for apnea, cough, choking and shortness of breath.    Cardiovascular: Negative for chest pain, palpitations and leg swelling.   Gastrointestinal: Negative for abdominal distention, abdominal pain, anal bleeding and blood in stool.   Genitourinary: Negative for difficulty urinating, dysuria, flank pain and frequency.   Musculoskeletal: Negative for back pain and gait problem.   Skin: Positive for color change and pallor.   Neurological: Negative for dizziness, facial asymmetry and headaches.   Hematological: Does not bruise/bleed easily.     Objective:     Vital Signs (Most Recent):  Temp: 97.8 °F (36.6 °C) (01/19/17 0100)  Pulse: 99 (01/19/17 0100)  Resp: 16 (01/19/17 0100)  BP: (!) 177/82 (01/19/17 0100)  SpO2: 96 % (01/18/17 1930) Vital Signs (24h Range):  Temp:  [97.8 °F (36.6 °C)-99.3 °F (37.4 °C)] 97.8 °F (36.6 °C)  Pulse:  [] 99  Resp:  [16-18] 16  SpO2:  [96 %-98 %] 96 %  BP: (146-186)/(74-82) 177/82     Weight: 49.4 kg (109 lb)  Body mass index is 19.31 kg/(m^2).  No intake or output data in the 24 hours ending 01/19/17 0643   Physical Exam   Constitutional: She appears well-developed. No distress.   HENT:   Sclerae of face and conjunctivae    Eyes: Right eye exhibits no discharge. Left eye exhibits no discharge. Scleral icterus is present.   Neck: Normal range of motion. Neck supple. No JVD present. No thyromegaly present.   Cardiovascular: Normal rate and regular rhythm.  Exam  reveals no friction rub.    No murmur heard.  Pulmonary/Chest: Effort normal and breath sounds normal. No respiratory distress. She has no wheezes. She has no rales.   Abdominal: Soft. Bowel sounds are normal. She exhibits no distension. There is no tenderness.   Negative for hepatic asterixis.    Neurological: No cranial nerve deficit. Coordination normal.   conscious but only oriented to person and place.    Skin: She is not diaphoretic.   Vitals reviewed.    Complete Blood Count Coagulation Profile       Recent Labs  Lab 01/16/17  0438 01/17/17  0620 01/19/17  0420   WBC 8.16 5.19 6.74   HGB 10.0* 10.0* 10.9*   HCT 28.9* 29.4* 31.3*   PLT 58* 65* 57*   MCV 96 96 93   RDW 15.9* 16.0* 16.3*      Recent Labs  Lab 01/17/17  0620 01/18/17  0519 01/19/17  0420   INR 1.8* 1.7* 1.9*        Basic Metabolic Panel  Liver Function Test and Lipid Profile     Recent Labs  Lab 01/17/17  0620 01/18/17  0519 01/19/17  0420    144 139   K 4.2 3.4* 3.4*   * 113* 108   CO2 22* 21* 20*   BUN 9 7 5*   CREATININE 0.6 0.8 0.7   CALCIUM 9.0 9.2 9.3   MG 1.8 1.7 1.5*   PHOS 4.3  --   --       Recent Labs  Lab 01/17/17  0620 01/18/17  0519 01/19/17  0420   PROT 6.8 7.2 7.1   BILITOT 9.5* 9.3* 9.4*   ALKPHOS 107 124 153*   ALT 34 38 39   AST 56* 61* 70*       Lab Results   Component Value Date    CHOL 175 06/15/2015    HDL 30 (L) 06/15/2015    LDLCALC 125.4 06/15/2015    TRIG 98 06/15/2015        POCT Glucose HbA1c   No results for input(s): POCTGLUCOSE in the last 168 hours. No results found for: HGBA1C     Cardiac Marker Echocardiography   No results for input(s): TROPONINI, BNP in the last 168 hours. EF   Date Value Ref Range Status   10/29/2015 65 55 - 65         Blood Culture  Urine Culture   Lab Results   Component Value Date    LABBLOO No Growth to date 01/15/2017    LABBLOO No Growth to date 01/15/2017    LABBLOO No Growth to date 01/15/2017    LABBLOO No Growth to date 01/15/2017    LABBLOO No Growth to date 01/15/2017     LABBLOO No Growth to date 01/15/2017    LABBLOO No Growth to date 01/15/2017    LABBLOO No Growth to date 01/15/2017    Lab Results   Component Value Date    LABURIN No growth 01/15/2017    LABURIN  09/18/2015     STAPHYLOCOCCUS EPIDERMIDIS  10,000 - 49,999 cfu/ml      LABURIN ESCHERICHIA COLI  >100,000 cfu/ml   09/05/2015        Medications:  Scheduled Meds:   clotrimazole  10 mg Oral TID    enoxaparin  40 mg Subcutaneous Daily    folic acid  1,000 mcg Oral Daily    lactulose  20 g Oral Q3H    lithium  300 mg Oral Daily    magnesium sulfate IVPB  2 g Intravenous Once    pantoprazole  40 mg Oral Daily    potassium chloride  40 mEq Oral Once    rifAXIMimin  550 mg Oral BID    thiamine  100 mg Oral Daily    zinc sulfate  220 mg Oral Daily     Continuous Infusions:   PRN Meds:dextrose 50%, dextrose 50%, glucagon (human recombinant), glucose, glucose, hydrOXYzine HCl, ondansetron, ramelteon

## 2017-01-19 NOTE — PT/OT/SLP PROGRESS
Physical Therapy  Treatment    Marely aHmilton   MRN: [id number]   Admitting Diagnosis: Hepatic encephalopathy    PT Received On: 17  PT Start Time: 1309     PT Stop Time: 1333    PT Total Time (min): 24 min   Completed with OT (patrick)    Billable Minutes:  Gait Training 10 minutes and Therapeutic Activity 14 minutes    Treatment Type: Treatment  PT/PTA: PT     PTA Visit Number: 0       General Precautions: Standard, fall  Orthopedic Precautions: N/A   Braces: N/A         Subjective:  Communicated with RN prior to session.  Pt agreeable to therapy. Pt's mother present. RN request to not leave patient in BS chair secondary to confusion. PT notified RN and updated white board regarding functional mobility. RN stated she would put patient in chair this afternoon.     Pain Ratin/10  Pain Rating Post-Intervention: 0/10    Objective:   Patient found with: peripheral IV    Functional Mobility:  Bed Mobility:   Scooting/Bridging: Contact Guard Assistance  Supine to Sit: Contact Guard Assistance (increased time to complete secondary to confusion)  Sit to Supine: Stand by Assistance    Transfers:  Sit <> Stand Assistance: Stand By Assistance  Sit <> Stand Assistive Device: No Assistive Device  Toilet Transfer Technique: Stand Pivot  Toilet Transfer Assistance: Stand By Assistance  Toilet Transfer Assistive Device: No Assistive Device    Gait:   Gait Distance: 20' within the room with CGA and no AD. Pt required cues for safety secondary to confusion  Assistance 1: Contact Guard Assistance  Gait Assistive Device: No device  Gait Pattern: reciprocal  Gait Deviation(s): decreased alex, decreased step length, decreased stride length    Stairs: did not occur     Balance:   Static Sit: FAIR+: Able to take MINIMAL challenges from all directions  Dynamic Sit: FAIR+: Maintains balance through MINIMAL excursions of active trunk motion  Static Stand: FAIR+: Takes MINIMAL challenges from all directions  Dynamic stand: FAIR:  Needs CONTACT GUARD during gait     Therapeutic Activities and Exercises:  Therapeutic activities aimed to increase pt's independence, safety, and efficiency with bed mobility and functional transfers. See above for assistance levels.   · Pt completed sit to stand from toilet with SBA and self cleaned self  · Pt maintained stand balance with SBA at sink to wash hands  · Pt required multiple re-directional cueing for safety     Gait training performed to increase pt's endurance, gait stability, safety, and independence.   · Attempted RW but patient confused with AD     AM-PAC 6 CLICK MOBILITY  How much help from another person does this patient currently need?   1 = Unable, Total/Dependent Assistance  2 = A lot, Maximum/Moderate Assistance  3 = A little, Minimum/Contact Guard/Supervision  4 = None, Modified [place]/Independent    Turning over in bed (including adjusting bedclothes, sheets and blankets)?: 3  Sitting down on and standing up from a chair with arms (e.g., wheelchair, bedside commode, etc.): 3  Moving from lying on back to sitting on the side of the bed?: 3  Moving to and from a bed to a chair (including a wheelchair)?: 3  Need to walk in hospital room?: 3  Climbing 3-5 steps with a railing?: 3  Total Score: 18    AM-PAC Raw Score CMS G-Code Modifier Level of Impairment Assistance   6 % Total / Unable   7 - 9 CM 80 - 100% Maximal Assist   10 - 14 CL 60 - 80% Moderate Assist   15 - 19 CK 40 - 60% Moderate Assist   20 - 22 CJ 20 - 40% Minimal Assist   23 CI 1-20% SBA / CGA   24 CH 0% Independent/ Mod I     Patient left head in chair position with all lines intact, call button in reach, bed alarm on, RN notified and mother present.    Assessment:  Marely Hamilton is a 51 y.o. female with a medical diagnosis of Hepatic encephalopathy. Pt met all goals today. Goals revised and appropriate. Pt progressing towards goals, but not at PLOF. Pt tolerated session well with improvements in cognitive  status. Pt is improving with therapy evidenced by completing bed mobility with CGA, transfers with SBA, and ambulated 20' with CGA and no AD. Pt would benefit from continued PT services to improve overall functional mobility. Recommend d/c to Home (with 24/7 assistance) to maximize functional independence.    Rehab identified problem list/impairments: Rehab identified problem list/impairments: impaired endurance, impaired sensation, impaired self care skills, impaired functional mobilty, gait instability, impaired balance, impaired cognition, decreased safety awareness    Rehab potential is good.    Activity tolerance: Good    Discharge recommendations: Discharge Facility/Level Of Care Needs: home health PT     Barriers to discharge: Barriers to Discharge: None    Equipment recommendations: Equipment Needed After Discharge: none     GOALS:   Physical Therapy Goals        Problem: Physical Therapy Goal    Goal Priority Disciplines Outcome Goal Variances Interventions   Physical Therapy Goal     PT/OT, PT      Physical Therapy Goal     PT Ongoing (interventions implemented as appropriate)     Description:  Goals to be met by: 2017     Patient will increase functional independence with mobility by performin. Supine to sit with Moderate Assistance- Met  Revised: Supine to sit with supervision   2. Sit to supine with Moderate Assistance- Met  Revised: Sit to supine with Moderate Assistance  3. Sit to stand transfer with Maximum Assistance- Met  Revised: Sit to stand transfer with supervision   4. Gait  x 10 feet with Maximum Assistance with or without appropriate AD. - Met  Revised: Gait  x 100  feet with Supervision with or without appropriate AD  5. (I) with BLE therapeutic exercises                 PLAN:    Patient to be seen 4 x/week  to address the above listed problems via gait training, therapeutic activities, therapeutic exercises, neuromuscular re-education  Plan of Care expires: 17  Plan of  Care reviewed with: patient, mother      Gertrudis OLIVAREZ Olya, PT  01/19/2017

## 2017-01-19 NOTE — PROGRESS NOTES
"[facility] Hospital Medicine  Progress Note    Patient Name: Marely Hamilton  MRN: [id number]  Patient Class: IP- Inpatient   Admission Date: 1/15/2017  Length of Stay: 4 days  Attending Physician: Blessing Cherry MD  Primary Care Provider: Viktor Ross MD    [facility] Medicine Team: [facility] 5 Adin Sawyer MD    Subjective:     Principal Problem:Hepatic encephalopathy    HPI:  This is Ms. Marely Hamilton, 51 year female known to have Liver Cirrhosis and ESLD secondary to alcohol abuse, Bipolar disorder she was brought to ED by her mother after she noticed that she was confused and her skin was discolor for the last couple of days.    When we evaluate the patient at bedside, she was by herself and not accompanied by anyone, patient was not good historian and she couldn't reply the question we were asking. So I called the mother Joy Oconnor [phone] and ask questions about current presentation. So based on mother conversation over the phone she  claimed that she was non complaint with her medication especially Lactulose and she did not keep track of her bowel movement on daily basis. Mother also mention that she was having some change in her basal mentation and when you ask her question and doesn't answer question and taking about her sister and cats and "doesn't make sense". Patient lives with her mother. Her mother noticed change in her face color and eyes color in the last couple of weeks more noticeably in the last couple of days. Mother denies if she observed any fever chills, or symptoms systemic infections and lethargy. No clear when she drinks the last alcohol drink.           Hospital Course:  01/18/17 - Patient improving on the PO lactulose    Interval History:     Over night, her mentation improved compared to admission. Still having .... . Had three - four bowel movement in the last 24 hours. Hepatology on board for possible transplant workup. Re ordered PT OT for " appropriate placement.     Review of Systems   Constitutional: Positive for fatigue. Negative for activity change, appetite change, chills, diaphoresis and fever.   HENT: Negative for congestion and dental problem.    Eyes: Negative for photophobia, pain, discharge and redness.   Respiratory: Negative for apnea, cough, choking and shortness of breath.    Cardiovascular: Negative for chest pain, palpitations and leg swelling.   Gastrointestinal: Negative for abdominal distention, abdominal pain, anal bleeding and blood in stool.   Genitourinary: Negative for difficulty urinating, dysuria, flank pain and frequency.   Musculoskeletal: Negative for back pain and gait problem.   Skin: Positive for color change and pallor.   Neurological: Negative for dizziness, facial asymmetry and headaches.   Hematological: Does not bruise/bleed easily.     Objective:     Vital Signs (Most Recent):  Temp: 97.8 °F (36.6 °C) (01/19/17 0100)  Pulse: 99 (01/19/17 0100)  Resp: 16 (01/19/17 0100)  BP: (!) 177/82 (01/19/17 0100)  SpO2: 96 % (01/18/17 1930) Vital Signs (24h Range):  Temp:  [97.8 °F (36.6 °C)-99.3 °F (37.4 °C)] 97.8 °F (36.6 °C)  Pulse:  [] 99  Resp:  [16-18] 16  SpO2:  [96 %-98 %] 96 %  BP: (146-186)/(74-82) 177/82     Weight: 49.4 kg (109 lb)  Body mass index is 19.31 kg/(m^2).  No intake or output data in the 24 hours ending 01/19/17 0643   Physical Exam   Constitutional: She appears well-developed. No distress.   HENT:   Sclerae of face and conjunctivae    Eyes: Right eye exhibits no discharge. Left eye exhibits no discharge. Scleral icterus is present.   Neck: Normal range of motion. Neck supple. No JVD present. No thyromegaly present.   Cardiovascular: Normal rate and regular rhythm.  Exam reveals no friction rub.    No murmur heard.  Pulmonary/Chest: Effort normal and breath sounds normal. No respiratory distress. She has no wheezes. She has no rales.   Abdominal: Soft. Bowel sounds are normal. She exhibits no  distension. There is no tenderness.   Negative for hepatic asterixis.    Neurological: No cranial nerve deficit. Coordination normal.   conscious but only oriented to person and place.    Skin: She is not diaphoretic.   Vitals reviewed.    Complete Blood Count Coagulation Profile       Recent Labs  Lab 01/16/17  0438 01/17/17  0620 01/19/17  0420   WBC 8.16 5.19 6.74   HGB 10.0* 10.0* 10.9*   HCT 28.9* 29.4* 31.3*   PLT 58* 65* 57*   MCV 96 96 93   RDW 15.9* 16.0* 16.3*      Recent Labs  Lab 01/17/17  0620 01/18/17  0519 01/19/17  0420   INR 1.8* 1.7* 1.9*        Basic Metabolic Panel  Liver Function Test and Lipid Profile     Recent Labs  Lab 01/17/17  0620 01/18/17  0519 01/19/17  0420    144 139   K 4.2 3.4* 3.4*   * 113* 108   CO2 22* 21* 20*   BUN 9 7 5*   CREATININE 0.6 0.8 0.7   CALCIUM 9.0 9.2 9.3   MG 1.8 1.7 1.5*   PHOS 4.3  --   --       Recent Labs  Lab 01/17/17 0620 01/18/17  0519 01/19/17  0420   PROT 6.8 7.2 7.1   BILITOT 9.5* 9.3* 9.4*   ALKPHOS 107 124 153*   ALT 34 38 39   AST 56* 61* 70*       Lab Results   Component Value Date    CHOL 175 06/15/2015    HDL 30 (L) 06/15/2015    LDLCALC 125.4 06/15/2015    TRIG 98 06/15/2015        POCT Glucose HbA1c   No results for input(s): POCTGLUCOSE in the last 168 hours. No results found for: HGBA1C     Cardiac Marker Echocardiography   No results for input(s): TROPONINI, BNP in the last 168 hours. EF   Date Value Ref Range Status   10/29/2015 65 55 - 65         Blood Culture  Urine Culture   Lab Results   Component Value Date    LABBLOO No Growth to date 01/15/2017    LABBLOO No Growth to date 01/15/2017    LABBLOO No Growth to date 01/15/2017    LABBLOO No Growth to date 01/15/2017    LABBLOO No Growth to date 01/15/2017    LABBLOO No Growth to date 01/15/2017    LABBLOO No Growth to date 01/15/2017    LABBLOO No Growth to date 01/15/2017    Lab Results   Component Value Date    LABURIN No growth 01/15/2017    LABURIN  09/18/2015      STAPHYLOCOCCUS EPIDERMIDIS  10,000 - 49,999 cfu/ml      LABURIN ESCHERICHIA COLI  >100,000 cfu/ml   09/05/2015        Medications:  Scheduled Meds:   clotrimazole  10 mg Oral TID    enoxaparin  40 mg Subcutaneous Daily    folic acid  1,000 mcg Oral Daily    lactulose  20 g Oral Q3H    lithium  300 mg Oral Daily    magnesium sulfate IVPB  2 g Intravenous Once    pantoprazole  40 mg Oral Daily    potassium chloride  40 mEq Oral Once    rifAXIMimin  550 mg Oral BID    thiamine  100 mg Oral Daily    zinc sulfate  220 mg Oral Daily     Continuous Infusions:   PRN Meds:dextrose 50%, dextrose 50%, glucagon (human recombinant), glucose, glucose, hydrOXYzine HCl, ondansetron, ramelteon       Assessment/Plan:      * Hepatic encephalopathy  - Non compliance history of her medication  - Ammonia at presentation was 50   - No clear etiology, differential diagnosis include but not limited to Hepatic Encephalopathy, medication overdose, deterioration of her ESLD, metabolic etiologies or infectious process.   - Delirium precaution    Daytime: awake, up in chair as tolerated, tv on, window shades up, frequent reorientation.   Nighttime: in bed, minimize interruptions, tv off, window shades down.  - Resumed lactulose and aim for bowel movement 3-4 times bowel movements   - Consulted hepatology and they agreed on current plan and will disucss with patient when she is more clear and comprehensive.  - Hepatitis panel and HIV negative  - PETH negative   - Hepatology will decide for transplant work up with patient and her mother.   - Started on long term vitamin treatment.  - Hepatology recommend possibility to consider in patient transplant evaluation.    MELD-Na score: 22 at 1/19/2017  4:20 AM  MELD score: 22 at 1/19/2017  4:20 AM  Calculated from:  Serum Creatinine: 0.7 mg/dL (Rounded to 1) at 1/19/2017  4:20 AM  Serum Sodium: 139 mmol/L (Rounded to 137) at 1/19/2017  4:20 AM  Total Bilirubin: 9.4 mg/dL at 1/19/2017  4:20  AM  INR(ratio): 1.9 at 1/19/2017  4:20 AM  Age: 51 years    Cirrhosis, Laennec's  - Cirrhosis secondary to alcohol abuse  - Review principal problem for more elaboration   - No fluid to be tapped in her ascitic fluid  - Consult Hepatology and they recommend to continue current plan (Lactulose and rifaximin)     Thrombocytopenia  - Could be secondary to her chronic liver disease and hypersplenism  - Restarted dvt ppx as her platelets are above 50    Chronic liver failure  - Review principal problem for more elaboration     Bipolar disorder in remission  - Hospitalized for crystal and psychosis x 2; also has had depression (total length 4) (duration 3 wks and 1.5 weeks with the others)  - On lithium daily and follow with her psychiatric  - Lithium level is normal  Resumed Lithium     Anemia  Recent Labs  Lab 01/16/17  0438 01/17/17  0620 01/19/17  0420   HGB 10.0* 10.0* 10.9*   HCT 28.9* 29.4* 31.3*   - epoetin eladio (PROCRIT) injection 40,000 Units    Alcohol dependence in remission  - No clear history of alcohol use  - Alcohol level is < 10  - Started on thiamine daily and will continue after discharge     Malnutrition  - Will consult dietary for their input.     Code: Full    VTE Risk Mitigation         Ordered     enoxaparin injection 40 mg  Daily     Route:  Subcutaneous        01/18/17 1518     Medium Risk of VTE  Once      01/15/17 1926          Adin Sawyer MD  Department of Hospital Medicine   [facility]

## 2017-01-19 NOTE — ASSESSMENT & PLAN NOTE
- Non compliance history of her medication  - Ammonia at presentation was 50   - No clear etiology, differential diagnosis include but not limited to Hepatic Encephalopathy, medication overdose, deterioration of her ESLD, metabolic etiologies or infectious process.   - Delirium precaution    Daytime: awake, up in chair as tolerated, tv on, window shades up, frequent reorientation.   Nighttime: in bed, minimize interruptions, tv off, window shades down.  - Resumed lactulose and aim for bowel movement 3-4 times bowel movements   - Consulted hepatology and they agreed on current plan and will disucss with patient when she is more clear and comprehensive.  - Hepatitis panel and HIV negative  - PETH negative   - Hepatology will decide for transplant work up with patient and her mother.   - Started on long term vitamin treatment.   MELD-Na score: 22 at 1/19/2017  4:20 AM  MELD score: 22 at 1/19/2017  4:20 AM  Calculated from:  Serum Creatinine: 0.7 mg/dL (Rounded to 1) at 1/19/2017  4:20 AM  Serum Sodium: 139 mmol/L (Rounded to 137) at 1/19/2017  4:20 AM  Total Bilirubin: 9.4 mg/dL at 1/19/2017  4:20 AM  INR(ratio): 1.9 at 1/19/2017  4:20 AM  Age: 51 years

## 2017-01-19 NOTE — ASSESSMENT & PLAN NOTE
Recent Labs  Lab 01/16/17  0438 01/17/17  0620 01/19/17  0420   HGB 10.0* 10.0* 10.9*   HCT 28.9* 29.4* 31.3*   - epoetin eladio (PROCRIT) injection 40,000 Units

## 2017-01-19 NOTE — PROGRESS NOTES
"Hepatology  Progress note      SUBJECTIVE:     Reason for Consult: ESLD    Initial HPI:  Patient is a 51 y.o. 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    No family presently at bedside, however the mother had reported that she had noticed a progressive decline in mental function over the past week.  She was unsure if the patient was taking her lactulose as prescribed.  No reported ETOH use, however the mother was unsure of this.  No known fevers, chills, rigors.      The patient was previously worked up for liver transplant at [place] and was in workup for OLT here at [facility] last Spring, however she stopped her evaluation because she said her [place] physician told her she "didn't need a transplant."  No further follow up since March, 2016.    Interval Hx: Mental status improved today - patient acknowledges she isn't "oriented"    OBJECTIVE:     Vital Signs (Most Recent)  Temp: 98.5 °F (36.9 °C) (01/19/17 1150)  Pulse: 84 (01/19/17 1150)  Resp: 18 (01/19/17 1150)  BP: 135/67 (01/19/17 1150)  SpO2: 98 % (01/19/17 1150)    Physical Exam:  General appearance: no acute distress;   Eyes: scleral icterus  Lungs: breath sounds vesicular in nature, air entry equal bilaterally,   Heart: regular rate and rhythm, S1, S2 clearly audible,  Abdomen: soft, non-tender, non-distended;  no rebound tenderness, rigidity, or guarding  Extremities: clubbing bilaterally  Pulses: 2+ and symmetric  Skin: Skin color, texture, turgor normal.   Neurologic: Encephalopathic    Laboratory    CBC:     Recent Labs  Lab 01/16/17  0438 01/17/17  0620 01/19/17  0420   WBC 8.16 5.19 6.74   RBC 3.02* 3.08* 3.37*   HGB 10.0* 10.0* 10.9*   HCT 28.9* 29.4* 31.3*   PLT 58* 65* 57*   MCV 96 96 93   MCH 33.1* 32.5* 32.3*   MCHC 34.6 34.0 34.8     BMP:     Recent Labs  Lab 01/17/17  0620 01/18/17  0519 01/19/17  0420   * 109 162*    144 139   K 4.2 " 3.4* 3.4*   * 113* 108   CO2 22* 21* 20*   BUN 9 7 5*   CREATININE 0.6 0.8 0.7   CALCIUM 9.0 9.2 9.3   MG 1.8 1.7 1.5*     Coagulation:     Recent Labs  Lab 01/19/17  0420   INR 1.9*           ASSESSMENT/PLAN:     ESLD 2/2 ETOH presenting with hepatic encephalopathy likely 2/2 medication noncompliance.  Patient has no obvious ascites for drainage by U/S, her UA was not consistent with infection.  She has no white count and no fevers.      PETH negative    Recommendations:     Continue lactulose with dose titrated to have 3-4 liquid BM's per day  Continue Rifaximin 500mg BID  Zinc Sulfate  Check daily CMP    Will revisit the idea of potential liver transplant evaluation with patient, patient's sister, and mother when patient's mental status has improved (patient previously declined evaluation).    Will seek Insurance Authorization for inpatient transplant evaluation.     Gagandeep Nunez   Gastroenterology Fellow PGY V  [phone]

## 2017-01-19 NOTE — PLAN OF CARE
Problem: Occupational Therapy Goal  Goal: Occupational Therapy Goal  Goals to be met by: 2/19/17     Patient will increase functional independence with ADLs by performing:    UE Dressing with [place].  LE Dressing with [place].  Grooming while standing at sink with [place].  Toileting from toilet with [place] for hygiene and clothing management.   Bathing from edge of bed with [place].  Toilet transfer to toilet with [place].  Upper extremity exercise program x15 reps per handout, with independence.  Pt will be Ox4.  Pt will state what to do in an emergency situation c 100% accuracy.  Pt will follow simple multi-step commands c 100% accuracy.  Pt will demonstrate good STM/LTM c 100% accuracy.  POC initiated

## 2017-01-19 NOTE — ASSESSMENT & PLAN NOTE
- No clear history of alcohol use  - Alcohol level is < 10  - Started on thiamine daily and will continue after discharge

## 2017-01-19 NOTE — PLAN OF CARE
Problem: Physical Therapy Goal  Goal: Physical Therapy Goal  Goals to be met by: 2017     Patient will increase functional independence with mobility by performin. Supine to sit with Moderate Assistance- Met  Revised: Supine to sit with supervision   2. Sit to supine with Moderate Assistance- Met  Revised: Sit to supine with Moderate Assistance  3. Sit to stand transfer with Maximum Assistance- Met  Revised: Sit to stand transfer with supervision   4. Gait x 10 feet with Maximum Assistance with or without appropriate AD. - Met  Revised: Gait x 100 feet with Supervision with or without appropriate AD  5. (I) with BLE therapeutic exercises   Outcome: Ongoing (interventions implemented as appropriate)  Pt met all goals today. Goals revised and appropriate. Pt progressing towards goals, but not at PLOF. Pt tolerated session well with improvements in cognitive status. Pt is improving with therapy evidenced by completing bed mobility with CGA, transfers with SBA, and ambulated 20' with CGA and no AD. Pt would benefit from continued PT services to improve overall functional mobility. Recommend d/c to Home (with 24/7 assistance) to maximize functional independence.     Gertrudis Dobson DPT, PT  2017

## 2017-01-19 NOTE — PROGRESS NOTES
[facility]  Adult Nutrition  Consult Note    SUMMARY     Recommendations    Recommendation/Intervention: 1. Cont w/ Regular diet; ordered Boost Plus TID to optimize meal intake  2. RD to monitor  Goals: Maintain meal intake >/=75%  Nutrition Goal Status: progressing towards goal  Communication of RD Recs: reviewed with RN (Rell)    Continuum of Care Plan    Referral to Outpatient Services: other (see comments) (Nutr D/c Plan: unable to determine @ this time)    Reason for Assessment    Reason for Assessment: RD follow-up  Diagnosis: liver disease, other (see comments) (hepatic encephalopathy 2/2 ESLD)  Relevent Medical History: ESLD 2/2 EtOH abuse, Bipolar   Interdisciplinary Rounds: did not attend     General Information Comments: Pt's MS improved since Monday when I first spoke to her; able to answer most questions appropriately; able to recall what she had for breakfast; interested in receiving Boost Plus w/ meals    Nutrition Prescription Ordered    Current Diet Order: Regular        Evaluation of Received Nutrients/Fluid Intake          Energy Calories Required: not meeting needs                 Protein Required: not meeting needs           Other Fluid (mL):  (IVPB meds)      Fluid Required: meeting needs  Comments: LBM 1/15        Nutrition Risk Screen     Nutrition Risk Screen: no indicators present    Nutrition/Diet History    Patient Reported Diet/Restrictions/Preferences: other (see comments) (JAMIL)  Typical Food/Fluid Intake: JAMIL  Food Preferences: JAMIL     Supplemental Drinks or Food Habits: Boost Plus (pta per prior nutr eval note)  Factors Affecting Nutritional Intake: impaired cognitive status/motor control, decreased appetite                Labs/Tests/Procedures/Meds       Pertinent Labs Reviewed: reviewed  Pertinent Labs Comments: Mg 1.5 (being replaced), Alb 3.4, K 3.4, BUN 5, Glu 162  Pertinent Medications Reviewed: reviewed  Pertinent Medications Comments: folic acid,  lactulose, lithium, Mg sulfate, pantoprazole, KCl, thiamine, zinc sulfate    Physical Findings    Overall Physical Appearance: generalized wasting        Skin: jaundice, intact, other (see comments) (Francisco 16)    Anthropometrics       Height (inches): 62.99 in  Weight Method: Bed Scale  Weight (kg): 49.4 kg     Ideal Body Weight (IBW), Female: 114.95 lb     % Ideal Body Weight, Female (lb): 99.92 lb  BMI (kg/m2): 20.35  BMI Grade: 18.5-24.9 - normal  Usual Body Weight (UBW), k kg  % Usual Body Weight: 110.85     Weight Loss: other (see comments) (none noted per prior notes)        Estimated/Assessed Needs    Weight Used For Calorie Calculations: 52.1 kg (114 lb 13.8 oz)   Height (cm): 160 cm     Energy Need Method: Coram-St Jeor (x 1.4 = 1558 cals)     RMR (Coram-St. Jeor Equation): 1113.48        Weight Used For Protein Calculations: 52.1 kg (114 lb 13.8 oz)  Protein Requirements: 62-73 gms (1.2-1.4 gms/kg)    Fluid Need Method: RDA Method (1ml/kcal or per MD)     Malnutrition (Undernutrition) Diagnosis    % Intake of Estimated Energy Needs: 25 - 50%  % Meal Intake: Other (comment) (0-50%)  Malnutrition Reason: chronic, illness related              Nutrition Diagnosis    Nutrition Problem: Inadequate energy intake  Etiology/Related To: multiple factors (decreased appetite, AMS)  Nutrition Diagnosis Signs/Symptoms As Evidenced By: % meals consumed  Nutrition Diagnosis Status: New    Monitor and Evaluation    Food and Nutrient Intake: energy intake, food and beverage intake  Food and Nutrient Adminstration: diet order        Anthropometric Measurements: weight, weight change  Biochemical Data, Medical Tests and Procedures: glucose/endocrine profile, electrolyte and renal panel, gastrointestinal profile  Nutrition-Focused Physical Findings: overall appearance    Nutrition Risk    Level of Risk: moderate    Nutrition Follow-Up    RD Follow-up?: Yes

## 2017-01-19 NOTE — PT/OT/SLP RE-EVAL
Occupational Therapy  Re-evaluation    Marely Hamilton   MRN: [id number]   Admitting Diagnosis: Hepatic encephalopathy    OT Date of Treatment: 01/19/17   OT Start Time: 1315  OT Stop Time: 1345  OT Total Time (min): 30 min    Billable Minutes:  Re-eval 15  Self Care/Home Management 15    Diagnosis: Hepatic encephalopathy       Past Medical History   Diagnosis Date    Alcohol abuse, in remission 6/15/2015     14.5 weeks ago; AA weekly    Anemia     Anxiety 6/15/2015    Behavioral problem     Bipolar disorder     Bipolar disorder in remission 6/15/2015    Cirrhosis, Laennec's 6/15/2015    Depression     Encounter for blood transfusion     Epistaxis 6/15/2015    Fatigue     Glaucoma     Hematuria     Hepatic encephalopathy 6/15/2015    Hepatic enlargement     History of psychiatric care     History of psychiatric hospitalization     History of seizure 6/15/2015     1    hx of intentional Tylenol overdose 6/15/2015     2005- situational and hx of bipolar    Jeana     Osteoarthritis     Other ascites 6/15/2015    Psychiatric problem     Renal disorder     Seizures     Self-harming behavior     Suicide attempt     Therapy     Thrombocytopenia 6/15/2015      Past Surgical History   Procedure Laterality Date    Esophagogastroduodenoscopy      Cosmetic surgery             General Precautions: Standard, fall  Orthopedic Precautions: N/A  Braces: N/A    Do you have any cultural, spiritual, Jehovah's witness conflicts, given your current situation?: None     Patient History:  Living Environment  Lives With: parent(s) (mother)  Living Arrangements: house  Living Environment Comment: Pt was non verbal during session and is poor historian due to cognition deficits. No family present. Per previous notes, pt lives in a house with mother.    Prior level of function:   Bed Mobility/Transfers:  (See initial eval for PLOF)  Grooming:  (unable to determine)  Bathing:  (unable to determine)  Upper Body Dressing:   (unable to determine)  Lower Body Dressing:  (unable to determine)  Toileting:  (unable to determine)            Subjective:  Communicated with RN prior to session.    Chief Complaint: Pt was admitted c cirrhosis, hepatic encephalopathy, and ESLD.  Patient/Family stated goals: To get better.    Pain Ratin/10              Pain Rating Post-Intervention: 0/10    Objective:  Patient found with: peripheral IV    Cognitive Exam:  Oriented to: Person, Place and Situation  Follows Commands/attention: Follows one-step commands  Communication: clear/fluent  Memory:  Impaired STM and Impaired LTM  Safety awareness/insight to disability: impaired  Coping skills/emotional control: Appropriate to situation    Visual/perceptual:  Intact    Physical Exam:  Postural examination/scapula alignment: No postural abnormalities identified  Skin integrity: Visible skin intact  Edema: None noted     Sensation:   Intact    Upper Extremity Range of Motion:  Right Upper Extremity: WFL  Left Upper Extremity: WFL    Upper Extremity Strength:  Right Upper Extremity: 4/5  Left Upper Extremity: 4/5   Strength: 4/5      Fine motor coordination:   Intact    Gross motor coordination: WFL    Functional Mobility:  Bed Mobility:  Supine to Sit: Stand by Assistance  Sit to Supine: Stand by Assistance    Transfers:  Sit <> Stand Assistance: Stand By Assistance  Sit <> Stand Assistive Device: No Assistive Device  Toilet Transfer Technique: Stand Pivot  Toilet Transfer Assistance: Stand By Assistance  Toilet Transfer Assistive Device: No Assistive Device    Functional Ambulation: Pt was able to walk to bathroom c CGA.    Activities of Daily Living:       UE Dressing Level of Assistance: Moderate assistance (To don hospital gown 2* IV lines)    Grooming Position: Standing at sink  Grooming Level of Assistance: Minimum assistance (To wash hands.)  Toileting Where Assessed: Toilet  Toileting Level of Assistance: Moderate assistance (For toilet  "hygiene)            Balance:   Static Sit: GOOD: Takes MODERATE challenges from all directions  Dynamic Sit: GOOD: Maintains balance through MODERATE excursions of active trunk movement  Static Stand: FAIR: Maintains without assist but unable to take challenges  Dynamic stand: FAIR: Needs CONTACT GUARD during gait      AM-PAC 6 CLICK ADL  How much help from another person does this patient currently need?  1 = Unable, Total/Dependent Assistance  2 = A lot, Maximum/Moderate Assistance  3 = A little, Minimum/Contact Guard/Supervision  4 = None, Modified [place]/Independent    Putting on and taking off regular lower body clothing? : 3  Bathing (including washing, rinsing, drying)?: 3  Toileting, which includes using toilet, bedpan, or urinal? : 3  Putting on and taking off regular upper body clothing?: 3  Taking care of personal grooming such as brushing teeth?: 3  Eating meals?: 4  Total Score: 19    AM-PAC Raw Score CMS "G-Code Modifier Level of Impairment Assistance   6 % Total / Unable   7 - 9 CM 80 - 100% Maximal Assist   10 - 14 CL 60 - 80% Moderate Assist   15 - 19 CK 40 - 60% Moderate Assist   20 - 22 CJ 20 - 40% Minimal Assist   23 CI 1-20% SBA / CGA   24 CH 0% Independent/ Mod I       Patient left supine with all lines intact, call button in reach, RN notified and family present    Assessment:  Marely Hamilton is a 51 y.o. female with a medical diagnosis of Hepatic encephalopathy and presents with deficits in ADL's, functional T/F's, decreased B UE strength and impaired cognition.  Pt was able to perform supine/sit, sit/stand, and walk to bathroom c CGA 2* fair dynamic standing balance.  Pt was Ox3- not date, and follows simple 1 step commands.  Has difficulty sequencing tasks and has poor STM/LTM.  Has deficits in safety awareness as well.  Able to perform toilet hygiene, grooming, and UB dressing c min A and cues.  Has 4/5 B UE strength.  Rehab identified problem list/impairments: Rehab " identified problem list/impairments: impaired self care skills, impaired functional mobilty, impaired cognition, decreased upper extremity function, decreased ROM    Rehab potential is good.    Activity tolerance: Good    Discharge recommendations: Discharge Facility/Level Of Care Needs: nursing facility, skilled     Barriers to discharge: Barriers to Discharge: Decreased caregiver support    Equipment recommendations: none     GOALS:   Occupational Therapy Goals        Problem: Occupational Therapy Goal    Goal Priority Disciplines Outcome Interventions   Occupational Therapy Goal     OT, PT/OT     Description:  Goals to be met by: 2/19/17     Patient will increase functional independence with ADLs by performing:    UE Dressing with [place].  LE Dressing with [place].  Grooming while standing at sink with [place].  Toileting from toilet with [place] for hygiene and clothing management.   Bathing from  edge of bed with [place].  Toilet transfer to toilet with [place].  Upper extremity exercise program x15 reps per handout, with independence.  Pt will be Ox4.  Pt will state what to do in an emergency situation c 100% accuracy.  Pt will follow simple multi-step commands c 100% accuracy.  Pt will demonstrate good STM/LTM c 100% accuracy.                PLAN:  Patient to be seen 5 x/week to address the above listed problems via self-care/home management, therapeutic activities, therapeutic exercises  Plan of Care expires: 02/19/17  Plan of Care reviewed with: patient, mother         BALBIR Harrell  01/19/2017

## 2017-01-20 LAB
ALBUMIN SERPL BCP-MCNC: 3.1 G/DL
ALP SERPL-CCNC: 172 U/L
ALT SERPL W/O P-5'-P-CCNC: 46 U/L
ANION GAP SERPL CALC-SCNC: 7 MMOL/L
AST SERPL-CCNC: 85 U/L
BACTERIA BLD CULT: NORMAL
BACTERIA BLD CULT: NORMAL
BILIRUB SERPL-MCNC: 7.7 MG/DL
BUN SERPL-MCNC: 5 MG/DL
CALCIUM SERPL-MCNC: 8.9 MG/DL
CHLORIDE SERPL-SCNC: 112 MMOL/L
CO2 SERPL-SCNC: 22 MMOL/L
CREAT SERPL-MCNC: 0.7 MG/DL
EST. GFR  (AFRICAN AMERICAN): >60 ML/MIN/1.73 M^2
EST. GFR  (NON AFRICAN AMERICAN): >60 ML/MIN/1.73 M^2
GLUCOSE SERPL-MCNC: 118 MG/DL
INR PPP: 2
MAGNESIUM SERPL-MCNC: 1.8 MG/DL
POTASSIUM SERPL-SCNC: 4 MMOL/L
PROT SERPL-MCNC: 6.6 G/DL
PROTHROMBIN TIME: 20.6 SEC
SODIUM SERPL-SCNC: 141 MMOL/L
TSH SERPL DL<=0.005 MIU/L-ACNC: 0.54 UIU/ML

## 2017-01-20 PROCEDURE — 90792 PSYCH DIAG EVAL W/MED SRVCS: CPT | Mod: GC,,, | Performed by: PSYCHIATRY & NEUROLOGY

## 2017-01-20 PROCEDURE — 36415 COLL VENOUS BLD VENIPUNCTURE: CPT

## 2017-01-20 PROCEDURE — 25000003 PHARM REV CODE 250: Performed by: STUDENT IN AN ORGANIZED HEALTH CARE EDUCATION/TRAINING PROGRAM

## 2017-01-20 PROCEDURE — 25000003 PHARM REV CODE 250: Performed by: INTERNAL MEDICINE

## 2017-01-20 PROCEDURE — 83735 ASSAY OF MAGNESIUM: CPT

## 2017-01-20 PROCEDURE — 11000001 HC ACUTE MED/SURG PRIVATE ROOM

## 2017-01-20 PROCEDURE — 25000003 PHARM REV CODE 250: Performed by: PSYCHIATRY & NEUROLOGY

## 2017-01-20 PROCEDURE — 99233 SBSQ HOSP IP/OBS HIGH 50: CPT | Mod: GC,,, | Performed by: INTERNAL MEDICINE

## 2017-01-20 PROCEDURE — 99233 SBSQ HOSP IP/OBS HIGH 50: CPT | Mod: GC,,, | Performed by: HOSPITALIST

## 2017-01-20 PROCEDURE — 80053 COMPREHEN METABOLIC PANEL: CPT

## 2017-01-20 PROCEDURE — 93005 ELECTROCARDIOGRAM TRACING: CPT

## 2017-01-20 PROCEDURE — 93010 ELECTROCARDIOGRAM REPORT: CPT | Mod: ,,, | Performed by: INTERNAL MEDICINE

## 2017-01-20 PROCEDURE — 85610 PROTHROMBIN TIME: CPT

## 2017-01-20 PROCEDURE — 84443 ASSAY THYROID STIM HORMONE: CPT

## 2017-01-20 RX ORDER — CLONAZEPAM 1 MG/1
1 TABLET ORAL 2 TIMES DAILY
Status: DISCONTINUED | OUTPATIENT
Start: 2017-01-20 | End: 2017-01-20

## 2017-01-20 RX ORDER — RISPERIDONE 0.5 MG/1
0.5 TABLET ORAL NIGHTLY
Status: DISCONTINUED | OUTPATIENT
Start: 2017-01-20 | End: 2017-01-20

## 2017-01-20 RX ORDER — LORAZEPAM 1 MG/1
2 TABLET ORAL EVERY 4 HOURS PRN
Status: DISCONTINUED | OUTPATIENT
Start: 2017-01-20 | End: 2017-01-21

## 2017-01-20 RX ADMIN — CLOTRIMAZOLE 10 MG: 10 LOZENGE ORAL at 02:01

## 2017-01-20 RX ADMIN — LACTULOSE 20 G: 10 SOLUTION ORAL at 03:01

## 2017-01-20 RX ADMIN — RIFAXIMIN 550 MG: 550 TABLET ORAL at 08:01

## 2017-01-20 RX ADMIN — Medication 100 MG: at 09:01

## 2017-01-20 RX ADMIN — CLOTRIMAZOLE 10 MG: 10 LOZENGE ORAL at 08:01

## 2017-01-20 RX ADMIN — LACTULOSE 20 G: 10 SOLUTION ORAL at 06:01

## 2017-01-20 RX ADMIN — CLOTRIMAZOLE 10 MG: 10 LOZENGE ORAL at 06:01

## 2017-01-20 RX ADMIN — LITHIUM CARBONATE 300 MG: 300 CAPSULE, GELATIN COATED ORAL at 11:01

## 2017-01-20 RX ADMIN — LACTULOSE 20 G: 10 SOLUTION ORAL at 11:01

## 2017-01-20 RX ADMIN — LACTULOSE 20 G: 10 SOLUTION ORAL at 09:01

## 2017-01-20 RX ADMIN — CLONAZEPAM 1 MG: 1 TABLET ORAL at 05:01

## 2017-01-20 RX ADMIN — Medication 220 MG: at 09:01

## 2017-01-20 RX ADMIN — PANTOPRAZOLE SODIUM 40 MG: 40 TABLET, DELAYED RELEASE ORAL at 09:01

## 2017-01-20 RX ADMIN — LACTULOSE 20 G: 10 SOLUTION ORAL at 08:01

## 2017-01-20 RX ADMIN — RIFAXIMIN 550 MG: 550 TABLET ORAL at 09:01

## 2017-01-20 RX ADMIN — FOLIC ACID 1000 MCG: 1 TABLET ORAL at 09:01

## 2017-01-20 RX ADMIN — LACTULOSE 20 G: 10 SOLUTION ORAL at 12:01

## 2017-01-20 NOTE — PROGRESS NOTES
"[facility] Hospital Medicine  Progress Note    Patient Name: Marely Hamilton  MRN: [id number]  Patient Class: IP- Inpatient   Admission Date: 1/15/2017  Length of Stay: 5 days  Attending Physician: Blessing Cherry MD  Primary Care Provider: Viktor Ross MD    [facility] Medicine Team: [facility] HOSP MED 5 Adin Sawyer MD    Subjective:     Principal Problem:Hepatic encephalopathy    HPI:  This is Ms. Marely Hamilton, 51 year female known to have Liver Cirrhosis and ESLD secondary to alcohol abuse, Bipolar disorder she was brought to ED by her mother after she noticed that she was confused and her skin was discolor for the last couple of days.    When we evaluate the patient at bedside, she was by herself and not accompanied by anyone, patient was not good historian and she couldn't reply the question we were asking. So I called the mother Joy Oconnor [phone] and ask questions about current presentation. So based on mother conversation over the phone she  claimed that she was non complaint with her medication especially Lactulose and she did not keep track of her bowel movement on daily basis. Mother also mention that she was having some change in her basal mentation and when you ask her question and doesn't answer question and taking about her sister and cats and "doesn't make sense". Patient lives with her mother. Her mother noticed change in her face color and eyes color in the last couple of weeks more noticeably in the last couple of days. Mother denies if she observed any fever chills, or symptoms systemic infections and lethargy. No clear when she drinks the last alcohol drink.           Hospital Course:  01/18/17 - Patient improving on the PO lactulose    Interval History:     Over night, she was doing fine, no acute event happened and no signs of deterioration of her mentation. Seems clearer this morning and able to conduct a conversation with me. No chills, fever, SOB or falls. " According to charts, one bowel movement in the last 24 hours.     Review of Systems   Constitutional: Positive for fatigue. Negative for activity change, appetite change, chills, diaphoresis and fever.   HENT: Negative for congestion and dental problem.    Eyes: Negative for photophobia, pain, discharge and redness.   Respiratory: Negative for apnea, cough, choking and shortness of breath.    Cardiovascular: Negative for chest pain, palpitations and leg swelling.   Gastrointestinal: Negative for abdominal distention, abdominal pain, anal bleeding and blood in stool.   Genitourinary: Negative for difficulty urinating, dysuria, flank pain and frequency.   Musculoskeletal: Negative for back pain and gait problem.   Skin: Positive for color change and pallor.   Neurological: Negative for dizziness, facial asymmetry and headaches.   Hematological: Does not bruise/bleed easily.     Objective:     Vital Signs (Most Recent):  Temp: 98.3 °F (36.8 °C) (01/20/17 0500)  Pulse: (!) 114 (01/20/17 0500)  Resp: 18 (01/20/17 0500)  BP: (!) 158/80 (01/20/17 0500)  SpO2: 96 % (01/20/17 0500) Vital Signs (24h Range):  Temp:  [98.1 °F (36.7 °C)-99.4 °F (37.4 °C)] 98.3 °F (36.8 °C)  Pulse:  [] 114  Resp:  [16-18] 18  SpO2:  [94 %-98 %] 96 %  BP: (132-158)/(63-80) 158/80     Weight: 49.4 kg (109 lb)  Body mass index is 19.31 kg/(m^2).  No intake or output data in the 24 hours ending 01/20/17 0708   Physical Exam   Constitutional: She appears well-developed. No distress.   HENT:   Sclerae of face and conjunctivae    Eyes: Right eye exhibits no discharge. Left eye exhibits no discharge. Scleral icterus is present.   Neck: Normal range of motion. Neck supple. No JVD present. No thyromegaly present.   Cardiovascular: Normal rate and regular rhythm.  Exam reveals no friction rub.    No murmur heard.  Pulmonary/Chest: Effort normal and breath sounds normal. No respiratory distress. She has no wheezes. She has no rales.   Abdominal: Soft.  Bowel sounds are normal. She exhibits no distension. There is no tenderness.   Negative for hepatic asterixis.    Neurological: No cranial nerve deficit. Coordination normal.   conscious but only oriented to person and place.    Skin: She is not diaphoretic.   Vitals reviewed.        Recent Labs  Lab 01/18/17 0519 01/19/17 0420 01/20/17  0504    139 141   K 3.4* 3.4* 4.0   * 108 112*   CO2 21* 20* 22*   BUN 7 5* 5*   CREATININE 0.8 0.7 0.7   CALCIUM 9.2 9.3 8.9   MG 1.7 1.5* 1.8      Recent Labs  Lab 01/18/17 0519 01/19/17  0420 01/20/17  0504   ALBUMIN 3.4* 3.4* 3.1*   PROT 7.2 7.1 6.6   BILITOT 9.3* 9.4* 7.7*   ALKPHOS 124 153* 172*   ALT 38 39 46*   AST 61* 70* 85*         Assessment/Plan:      * Hepatic encephalopathy  - Non compliance history of her medication  - Ammonia at presentation was 50   - No clear etiology, differential diagnosis include but not limited to Hepatic Encephalopathy, medication overdose, deterioration of her ESLD, metabolic etiologies or infectious process.   - Delirium precaution    Daytime: awake, up in chair as tolerated, tv on, window shades up, frequent reorientation.   Nighttime: in bed, minimize interruptions, tv off, window shades down.  - Resumed lactulose and aim for bowel movement 3-4 times bowel movements   - Consulted hepatology and they agreed on current plan and will disucss with patient when she is more clear and comprehensive.  - Hepatitis panel and HIV negative  - PETH negative   - Hepatology will decide for transplant work up with patient and her sister and they tried to reach her sister and communicate regarding liver transplant workup. If failed to reach sister will communicate mother. If no transplant workup will be initiated, palliative consult is imperative.   - Hepatology called her sister and she will come over the week and discuss the transplant work up  - Also Hepatology recommend to consult psychiatry given her crystal episode and being on lithium.    - Started on long term vitamin treatment.   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  - Review principal problem for more elaboration   - No fluid to be tapped in her ascitic fluid  - Consult Hepatology and they recommend to continue current plan (Lactulose and rifaximin)   - Review Above problem for more elaboration     Thrombocytopenia  - Could be secondary to her chronic liver disease and hypersplenism  - Restarted dvt ppx as her platelets are above 50    Chronic liver failure  - Review principal problem for more elaboration     Bipolar disorder in remission  - Hospitalized for crystal and psychosis x 2; also has had depression (total length 4) (duration 3 wks and 1.5 weeks with the others)  - On lithium daily and follow with her psychiatric  - Lithium level is normal  Resumed Lithium     Anemia    Recent Labs  Lab 01/16/17  0438 01/17/17  0620 01/19/17  0420   HGB 10.0* 10.0* 10.9*   HCT 28.9* 29.4* 31.3*   - epoetin eladio (PROCRIT) injection 40,000 Units      Alcohol dependence in remission  - No clear history of alcohol use  - Alcohol level is < 10  - Started on thiamine daily and will continue after discharge    Malnutrition  - Will consult dietary for their input.     VTE Risk Mitigation         Ordered     enoxaparin injection 40 mg  Daily     Route:  Subcutaneous        01/18/17 1518     Medium Risk of VTE  Once      01/15/17 1926          Adin Sawyer MD  Department of Hospital Medicine   [facility]

## 2017-01-20 NOTE — ASSESSMENT & PLAN NOTE
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

## 2017-01-20 NOTE — PLAN OF CARE
CM in to see pt with IMFRANKLIN Richardson looking medically better and talkative appropriate.  Pt understands per Medicine she will be transferred to Liver team for Liver workup for possible transplant.

## 2017-01-20 NOTE — SUBJECTIVE & OBJECTIVE
Interval History:     Over night, she was doing fine, no acute event happened and no signs of deterioration of her mentation. Seems clearer this morning and able to conduct a conversation with me. No chills, fever, SOB or falls. According to charts, one bowel movement in the last 24 hours.     Review of Systems   Constitutional: Positive for fatigue. Negative for activity change, appetite change, chills, diaphoresis and fever.   HENT: Negative for congestion and dental problem.    Eyes: Negative for photophobia, pain, discharge and redness.   Respiratory: Negative for apnea, cough, choking and shortness of breath.    Cardiovascular: Negative for chest pain, palpitations and leg swelling.   Gastrointestinal: Negative for abdominal distention, abdominal pain, anal bleeding and blood in stool.   Genitourinary: Negative for difficulty urinating, dysuria, flank pain and frequency.   Musculoskeletal: Negative for back pain and gait problem.   Skin: Positive for color change and pallor.   Neurological: Negative for dizziness, facial asymmetry and headaches.   Hematological: Does not bruise/bleed easily.     Objective:     Vital Signs (Most Recent):  Temp: 98.3 °F (36.8 °C) (01/20/17 0500)  Pulse: (!) 114 (01/20/17 0500)  Resp: 18 (01/20/17 0500)  BP: (!) 158/80 (01/20/17 0500)  SpO2: 96 % (01/20/17 0500) Vital Signs (24h Range):  Temp:  [98.1 °F (36.7 °C)-99.4 °F (37.4 °C)] 98.3 °F (36.8 °C)  Pulse:  [] 114  Resp:  [16-18] 18  SpO2:  [94 %-98 %] 96 %  BP: (132-158)/(63-80) 158/80     Weight: 49.4 kg (109 lb)  Body mass index is 19.31 kg/(m^2).  No intake or output data in the 24 hours ending 01/20/17 0708   Physical Exam   Constitutional: She appears well-developed. No distress.   HENT:   Sclerae of face and conjunctivae    Eyes: Right eye exhibits no discharge. Left eye exhibits no discharge. Scleral icterus is present.   Neck: Normal range of motion. Neck supple. No JVD present. No thyromegaly present.    Cardiovascular: Normal rate and regular rhythm.  Exam reveals no friction rub.    No murmur heard.  Pulmonary/Chest: Effort normal and breath sounds normal. No respiratory distress. She has no wheezes. She has no rales.   Abdominal: Soft. Bowel sounds are normal. She exhibits no distension. There is no tenderness.   Negative for hepatic asterixis.    Neurological: No cranial nerve deficit. Coordination normal.   conscious but only oriented to person and place.    Skin: She is not diaphoretic.   Vitals reviewed.        Recent Labs  Lab 01/18/17 0519 01/19/17  0420 01/20/17  0504    139 141   K 3.4* 3.4* 4.0   * 108 112*   CO2 21* 20* 22*   BUN 7 5* 5*   CREATININE 0.8 0.7 0.7   CALCIUM 9.2 9.3 8.9   MG 1.7 1.5* 1.8      Recent Labs  Lab 01/18/17 0519 01/19/17  0420 01/20/17  0504   ALBUMIN 3.4* 3.4* 3.1*   PROT 7.2 7.1 6.6   BILITOT 9.3* 9.4* 7.7*   ALKPHOS 124 153* 172*   ALT 38 39 46*   AST 61* 70* 85*

## 2017-01-20 NOTE — CONSULTS
1/20/2017 2:52 PM   Marely Hamilton   1965   [id number]        Psychiatric H&P     Chief complaint/Reason for consult: Bipolar Disorder    SUBJECTIVE:   HPI:   Marely Hamilton is a 51 y.o. female with past psychiatric history of Bipolar Disorder, Alcohol Use Disorder, currently admitted with Decompensated Alcoholic Cirrhosis, Hepatic encephalopathy.  Psychiatry has been consulted to address Bipolar Disorder.     Patient evaluated this afternoon with mother at bedside. She is awake and alert. Pt is oriented to location, city, state, month and year. She reports feeling like her mind is racing and is anxiou. Mother requests she be placed back on klonopin because she has been agitated and keeps trying to get out of bed. Patient has not been sleeping well at night. She endorses cognitive slowing during hospitalization and episodes of confusion, but denies AVH. Pt reports racing thoughts, decreased sleep prior to entering the hospital. Was drinking at least 1 bottle of Wine daily and has been trying to cut down on etoh intake recently.     Pt has h/o manic episodes in the past associated with increased energy and activity, distractibility, pressure speech, and reckless behavior. She denies suicidal or homicidal thoughts. She expresses desire to become sober and plans to enter sober living once discharged.     She has been on a higher dose of lithium before and could not tolerate it. She has also taken seroquel but felt it was too sedating. Pt has tolerated risperdal in the past.       Collateral:   Mother at bedside confirms documented hx.      Current Medications:   Scheduled Meds:    clotrimazole  10 mg Oral TID    folic acid  1,000 mcg Oral Daily    lactulose  20 g Oral Q3H    lithium  300 mg Oral Daily    pantoprazole  40 mg Oral Daily    rifAXIMimin  550 mg Oral BID    thiamine  100 mg Oral Daily    zinc sulfate  220 mg Oral Daily      PRN Meds: dextrose 50%, dextrose 50%, glucagon (human recombinant),  glucose, glucose, hydrOXYzine HCl, ondansetron, ramelteon   Psychotherapeutics     Start     Stop Route Frequency Ordered    01/16/17 0900  lithium capsule 300 mg      -- Oral Daily 01/15/17 1926    01/15/17 2050  olanzapine injection 2.5 mg      01/15 2359 IM Once as needed 01/15/17 1950    01/18/17 1609  ramelteon tablet 8 mg      -- Oral Nightly PRN 01/18/17 1509        OTC Meds: unknown  Home Meds: lithium 300mg po QHS, clonazepam 2mg po 4x daily    Allergies:   Review of patient's allergies indicates:   Allergen Reactions    Sulfa (sulfonamide antibiotics) Rash        Past Medical/Surgical History:   Past Medical History   Diagnosis Date    Alcohol abuse, in remission 6/15/2015     14.5 weeks ago; AA weekly    Anemia     Anxiety 6/15/2015    Behavioral problem     Bipolar disorder     Bipolar disorder in remission 6/15/2015    Cirrhosis, Laennec's 6/15/2015    Depression     Encounter for blood transfusion     Epistaxis 6/15/2015    Fatigue     Glaucoma     Hematuria     Hepatic encephalopathy 6/15/2015    Hepatic enlargement     History of psychiatric care     History of psychiatric hospitalization     History of seizure 6/15/2015     1    hx of intentional Tylenol overdose 6/15/2015     2005- situational and hx of bipolar    Jeana     Osteoarthritis     Other ascites 6/15/2015    Psychiatric problem     Renal disorder     Seizures     Self-harming behavior     Suicide attempt     Therapy     Thrombocytopenia 6/15/2015     Past Surgical History   Procedure Laterality Date    Esophagogastroduodenoscopy      Cosmetic surgery          Past Psychiatric History:   Diagnoses: Bipolar disorder  Previous Medication Trials:yes, zoloft-induced jeana, xanax- anxiety, Ambien- insomnia, Restoril, Seroquel- too strong, Risperdal-did well, unable to tolerate higher doses of lithium in the past  Previous Psychiatric Hospitalizations: yes, pt recalls 4x for symptoms of psychosis and jeana; 1x  suicide attempt  Previous Suicide Attempts: yes- tylenol OD following crystal impulsivity leading to social issues including lose of job/BF  History of Violence:no   Outpatient Psychiatrist:yes- Dr. Coates U, sees Q3 months, last saw Nov 4, 2016. [facility] chart review, she is prescribed klonopin 2mg po 4x daily, lithium 300mg po QHS      Nerurological History:   Seizures: yes, many years ago  Head trauma: yes, 3 falls    Social History:   Childhood history: Happy childhood prior to fathers suicide; grew up in [place] with her 3 sisters, mother and father  History of Abuse (whether as abuser or abused): no  Education: completed a law degree at [employer] Ed: no  Relational: single  Children: 0  Occupational: on disability  Financial status: social security disability for mental health disorder and seizures   history: No  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[employer]; loves looking for [employer] clothing/[employer]   Yazdanism: Yarsanism    Legal:   Past Charges/Incarcerations:yes; charged with DWI in 2005 but it was dismissed  Pending charges: no    Psychosocial Factors:  Maladaptive or problem behaviors: past alcohol use  Peer group, social, ethic, cultural, emotional, and health factors: does not have significant peer group  Living situation, family constellation, family circumstances/homeLives in [place] with her 85 yo mother; 1 younger sister who is an  in [place] and 1 older sister who is an  in [place]  Recovery environment: lives in a supportive home with her mother; younger sister is also very supportive and would like to help  Community resources used by patient: Recently started to attend AA meetings  Treatment acceptance/motivation for change: Pt is aware of her condition and is motivated to change so she can be around to help her aging mother when she needs her in the future       Substance Abuse History:   Recreational Drugs:denies  Use of Alcohol: heavy, reports drinking 1 bottle of wine daily.  Per chart, Pt has h/o of drinking fifth of vodka for about 10 years.  Tobacco Use:denies   Rehab History: yes      Family Psychiatric History:   father ([place] and [place]) was bipolar, alcoholic, and committed suicide by TCA OD when she was 18; Older sister Bridget is an alcoholic not interested in Tx      OBJECTIVE:   Vitals   Vitals:    01/20/17 1200   BP: (!) 160/70   Pulse: 90   Resp: 16   Temp: 98.4 °F (36.9 °C)        Labs/Imaging/Studies:   Recent Results (from the past 48 hour(s))   Comprehensive Metabolic Panel (CMP)    Collection Time: 01/19/17  4:20 AM   Result Value Ref Range    Sodium 139 136 - 145 mmol/L    Potassium 3.4 (L) 3.5 - 5.1 mmol/L    Chloride 108 95 - 110 mmol/L    CO2 20 (L) 23 - 29 mmol/L    Glucose 162 (H) 70 - 110 mg/dL    BUN, Bld 5 (L) 6 - 20 mg/dL    Creatinine 0.7 0.5 - 1.4 mg/dL    Calcium 9.3 8.7 - 10.5 mg/dL    Total Protein 7.1 6.0 - 8.4 g/dL    Albumin 3.4 (L) 3.5 - 5.2 g/dL    Total Bilirubin 9.4 (H) 0.1 - 1.0 mg/dL    Alkaline Phosphatase 153 (H) 55 - 135 U/L    AST 70 (H) 10 - 40 U/L    ALT 39 10 - 44 U/L    Anion Gap 11 8 - 16 mmol/L    eGFR if African American >60.0 >60 mL/min/1.73 m^2    eGFR if non African American >60.0 >60 mL/min/1.73 m^2   PT/INR    Collection Time: 01/19/17  4:20 AM   Result Value Ref Range    Prothrombin Time 18.9 (H) 9.0 - 12.5 sec    INR 1.9 (H) 0.8 - 1.2   Magnesium    Collection Time: 01/19/17  4:20 AM   Result Value Ref Range    Magnesium 1.5 (L) 1.6 - 2.6 mg/dL   CBC auto differential    Collection Time: 01/19/17  4:20 AM   Result Value Ref Range    WBC 6.74 3.90 - 12.70 K/uL    RBC 3.37 (L) 4.00 - 5.40 M/uL    Hemoglobin 10.9 (L) 12.0 - 16.0 g/dL    Hematocrit 31.3 (L) 37.0 - 48.5 %    MCV 93 82 - 98 fL    MCH 32.3 (H) 27.0 - 31.0 pg    MCHC 34.8 32.0 - 36.0 %    RDW 16.3 (H) 11.5 - 14.5 %    Platelets 57 (L) 150 - 350 K/uL    MPV 9.0 (L) 9.2 - 12.9 fL    Gran # 5.1 1.8 - 7.7 K/uL    Lymph # 0.7 (L) 1.0 - 4.8 K/uL    Mono # 0.7 0.3 - 1.0  "K/uL    Eos # 0.2 0.0 - 0.5 K/uL    Baso # 0.02 0.00 - 0.20 K/uL    Gran% 76.4 (H) 38.0 - 73.0 %    Lymph% 9.9 (L) 18.0 - 48.0 %    Mono% 10.2 4.0 - 15.0 %    Eosinophil% 3.1 0.0 - 8.0 %    Basophil% 0.3 0.0 - 1.9 %    Differential Method Automated    Comprehensive Metabolic Panel (CMP)    Collection Time: 01/20/17  5:04 AM   Result Value Ref Range    Sodium 141 136 - 145 mmol/L    Potassium 4.0 3.5 - 5.1 mmol/L    Chloride 112 (H) 95 - 110 mmol/L    CO2 22 (L) 23 - 29 mmol/L    Glucose 118 (H) 70 - 110 mg/dL    BUN, Bld 5 (L) 6 - 20 mg/dL    Creatinine 0.7 0.5 - 1.4 mg/dL    Calcium 8.9 8.7 - 10.5 mg/dL    Total Protein 6.6 6.0 - 8.4 g/dL    Albumin 3.1 (L) 3.5 - 5.2 g/dL    Total Bilirubin 7.7 (H) 0.1 - 1.0 mg/dL    Alkaline Phosphatase 172 (H) 55 - 135 U/L    AST 85 (H) 10 - 40 U/L    ALT 46 (H) 10 - 44 U/L    Anion Gap 7 (L) 8 - 16 mmol/L    eGFR if African American >60.0 >60 mL/min/1.73 m^2    eGFR if non African American >60.0 >60 mL/min/1.73 m^2   PT/INR    Collection Time: 01/20/17  5:04 AM   Result Value Ref Range    Prothrombin Time 20.6 (H) 9.0 - 12.5 sec    INR 2.0 (H) 0.8 - 1.2   Magnesium    Collection Time: 01/20/17  5:04 AM   Result Value Ref Range    Magnesium 1.8 1.6 - 2.6 mg/dL   TSH    Collection Time: 01/20/17 12:02 PM   Result Value Ref Range    TSH 0.538 0.400 - 4.000 uIU/mL      Lab Results   Component Value Date    PHENOBARB <2.0 (L) 01/15/2017           Mental Status Exam:   Appearance: thin, jaundiced, scleral icterus  Level of Consciousness: alert, awake   Orientation: intact to person, place, time   Grooming: Disheveled  Psychomotor Behavior: retardation   Speech: soft, slow   Language: fluent English   Mood: "anxious"   Affect: blunted   Thought Process: slowed, but linear and logical   Associations: intact, no loosening of associations   Thought Content: denies suicidal/homicidal ideation, denies plan or intent for self harm or harm to others; no evidence of hallucinations, or " delusions   Memory: intact to recent medical events  Attention: intact, not distractible   Fund of Knowledge: appropriate for setting   Insight: intact, appropriate   Judgment: intact, appropriate       ASSESSMENT/PLAN:   Bipolar Disorder type I  Alcohol Use Disorder    Recommendations:   - Pt does not meet criteria for involuntary psychiatric hospitalization; she is not suicidal, homicidal or gravely disabled. The described symptoms she experienced prior to being hospitalized are likely due alcohol/benzo withdrawal rather than crystal as she was trying to cut back on etoh intake.  -She was prescribed klonopin 2mg po 4x day prior to entering the hospital. She is at risk of benzo withdrawal and has had episodes of tachycardia;  Place on withdrawal precautions, monitor vital every 4 hours.  - Will restart scheduled klonopin 1mg po BID. Will also order Ativan 2mg po every 4 hours prn systolic blood pressure > 160, Diastolic Blood pressure >110, heart rate > 110, tremors, diaphoresis, or other signs of withdrawal.  -Continue Lithium 300mg po QHS, she was unable to tolerate higher doses in the past.   - Patients EKG displayed QTc prolongation on 1/15/17 @ 503. Would recheck EKG, and if it is no longer prolonged (<460), consider risperdal 0.5mg po QHS to regulate sleep/wake cycle during delirium due to hepatic encephalopathy. This can also be used as a duel mood stabilizer. She has tolerated this medication in the past.   - Multivitamin, Thiamine, and Folate qdaily   -  At this current time the pt states she is motivated to quit drinking alcohol, would recommend she transition to a residential rehab program.       Case discussed with Dr. Amara Brannon Wiedemann, M.D.   [facility] Psychiatry PGY4  1/20/2017

## 2017-01-20 NOTE — ASSESSMENT & PLAN NOTE
- Non compliance history of her medication  - Ammonia at presentation was 51   - No clear etiology, differential diagnosis include but not limited to Hepatic Encephalopathy, medication overdose, deterioration of her ESLD, metabolic etiologies or infectious process.   - Delirium precaution    Daytime: awake, up in chair as tolerated, tv on, window shades up, frequent reorientation.   Nighttime: in bed, minimize interruptions, tv off, window shades down.  - Resumed lactulose and aim for bowel movement 3-4 times bowel movements   - Consulted hepatology and they agreed on current plan and will disucss with patient when she is more clear and comprehensive.  - Hepatitis panel and HIV negative  - PETH negative   - Hepatology will decide for transplant work up with patient and her sister and they tried to reach her sister and communicate regarding liver transplant workup. If failed to reach sister will communicate mother. If no transplant workup will be initiated, palliative consult is imperative.   - Started on long term vitamin treatment.   MELD-Na score: 22 at 1/20/2017  5:04 AM  MELD score: 22 at 1/20/2017  5:04 AM  Calculated from:  Serum Creatinine: 0.7 mg/dL (Rounded to 1) at 1/20/2017  5:04 AM  Serum Sodium: 141 mmol/L (Rounded to 137) at 1/20/2017  5:04 AM  Total Bilirubin: 7.7 mg/dL at 1/20/2017  5:04 AM  INR(ratio): 2.0 at 1/20/2017  5:04 AM  Age: 51 years

## 2017-01-20 NOTE — PROGRESS NOTES
"Hepatology  Progress note      SUBJECTIVE:     Reason for Consult: ESLD    Initial HPI:  Patient is a 51 y.o. 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    No family presently at bedside, however the mother had reported that she had noticed a progressive decline in mental function over the past week.  She was unsure if the patient was taking her lactulose as prescribed.  No reported ETOH use, however the mother was unsure of this.  No known fevers, chills, rigors.      The patient was previously worked up for liver transplant at [facility] and was in workup for OLT here at [facility] last Spring, however she stopped her evaluation because she said her [facility] physician told her she "didn't need a transplant."  No further follow up since March, 2016.    Interval Hx: Mental status improved today - patient states she thinks she's going into a Manic Episode    OBJECTIVE:     Vital Signs (Most Recent)  Temp: 98.4 °F (36.9 °C) (01/20/17 0735)  Pulse: 80 (01/20/17 0735)  Resp: 16 (01/20/17 0735)  BP: 135/65 (01/20/17 0735)  SpO2: (!) 92 % (01/20/17 0735)    Physical Exam:  General appearance: no acute distress;   Eyes: scleral icterus  Lungs: breath sounds vesicular in nature, air entry equal bilaterally,   Heart: regular rate and rhythm, S1, S2 clearly audible,  Abdomen: soft, non-tender, non-distended;  no rebound tenderness, rigidity, or guarding  Extremities: clubbing bilaterally  Pulses: 2+ and symmetric  Skin: Skin color, texture, turgor normal.   Neurologic: no focal deficits noted    Laboratory    CBC:     Recent Labs  Lab 01/16/17  0438 01/17/17  0620 01/19/17  0420   WBC 8.16 5.19 6.74   RBC 3.02* 3.08* 3.37*   HGB 10.0* 10.0* 10.9*   HCT 28.9* 29.4* 31.3*   PLT 58* 65* 57*   MCV 96 96 93   MCH 33.1* 32.5* 32.3*   MCHC 34.6 34.0 34.8     BMP:     Recent Labs  Lab 01/18/17  0519 01/19/17  0420 01/20/17  0504    162* 118* "    139 141   K 3.4* 3.4* 4.0   * 108 112*   CO2 21* 20* 22*   BUN 7 5* 5*   CREATININE 0.8 0.7 0.7   CALCIUM 9.2 9.3 8.9   MG 1.7 1.5* 1.8     Coagulation:     Recent Labs  Lab 01/20/17  0504   INR 2.0*           ASSESSMENT/PLAN:     ESLD 2/2 ETOH presenting with hepatic encephalopathy likely 2/2 medication noncompliance.  Patient has no obvious ascites for drainage by U/S, her UA was not consistent with infection.  She has no white count and no fevers.      PETH negative    Recommendations:     Psychiatry consult today to evaluate for comorbid conditions affecting mental status (i.e. Jeana)    Continue lactulose with dose titrated to have 3-4 liquid BM's per day  Continue Rifaximin 500mg BID  Zinc Sulfate  Check daily CMP    Will revisit the idea of potential liver transplant evaluation with patient, patient's sister, and mother over the weekend -- sister is planning on visiting from [place]   Gastroenterology Fellow PGY V  [phone]

## 2017-01-21 PROBLEM — K92.0 HEMATEMESIS: Status: ACTIVE | Noted: 2017-01-21

## 2017-01-21 PROBLEM — K92.2 UGIB (UPPER GASTROINTESTINAL BLEED): Status: ACTIVE | Noted: 2017-01-21

## 2017-01-21 LAB
ABO + RH BLD: NORMAL
ALBUMIN SERPL BCP-MCNC: 3.3 G/DL
ALBUMIN SERPL BCP-MCNC: 3.3 G/DL
ALP SERPL-CCNC: 235 U/L
ALP SERPL-CCNC: 235 U/L
ALT SERPL W/O P-5'-P-CCNC: 51 U/L
ALT SERPL W/O P-5'-P-CCNC: 51 U/L
ANION GAP SERPL CALC-SCNC: 16 MMOL/L
ANION GAP SERPL CALC-SCNC: 16 MMOL/L
AST SERPL-CCNC: 94 U/L
AST SERPL-CCNC: 94 U/L
BASOPHILS # BLD AUTO: 0.01 K/UL
BASOPHILS # BLD AUTO: 0.03 K/UL
BASOPHILS # BLD AUTO: 0.04 K/UL
BASOPHILS NFR BLD: 0.2 %
BASOPHILS NFR BLD: 0.4 %
BASOPHILS NFR BLD: 0.5 %
BILIRUB SERPL-MCNC: 7.5 MG/DL
BILIRUB SERPL-MCNC: 7.5 MG/DL
BLD GP AB SCN CELLS X3 SERPL QL: NORMAL
BUN SERPL-MCNC: 7 MG/DL
BUN SERPL-MCNC: 7 MG/DL
CALCIUM SERPL-MCNC: 9.2 MG/DL
CALCIUM SERPL-MCNC: 9.2 MG/DL
CHLORIDE SERPL-SCNC: 109 MMOL/L
CHLORIDE SERPL-SCNC: 109 MMOL/L
CO2 SERPL-SCNC: 16 MMOL/L
CO2 SERPL-SCNC: 16 MMOL/L
CREAT SERPL-MCNC: 0.7 MG/DL
CREAT SERPL-MCNC: 0.7 MG/DL
DIFFERENTIAL METHOD: ABNORMAL
EOSINOPHIL # BLD AUTO: 0.1 K/UL
EOSINOPHIL # BLD AUTO: 0.2 K/UL
EOSINOPHIL # BLD AUTO: 0.2 K/UL
EOSINOPHIL # BLD AUTO: 0.3 K/UL
EOSINOPHIL # BLD AUTO: 0.3 K/UL
EOSINOPHIL NFR BLD: 2.1 %
EOSINOPHIL NFR BLD: 2.5 %
EOSINOPHIL NFR BLD: 2.5 %
EOSINOPHIL NFR BLD: 4.4 %
EOSINOPHIL NFR BLD: 4.4 %
ERYTHROCYTE [DISTWIDTH] IN BLOOD BY AUTOMATED COUNT: 17.1 %
ERYTHROCYTE [DISTWIDTH] IN BLOOD BY AUTOMATED COUNT: 17.1 %
ERYTHROCYTE [DISTWIDTH] IN BLOOD BY AUTOMATED COUNT: 17.2 %
ERYTHROCYTE [DISTWIDTH] IN BLOOD BY AUTOMATED COUNT: 17.3 %
ERYTHROCYTE [DISTWIDTH] IN BLOOD BY AUTOMATED COUNT: 17.4 %
EST. GFR  (AFRICAN AMERICAN): >60 ML/MIN/1.73 M^2
EST. GFR  (AFRICAN AMERICAN): >60 ML/MIN/1.73 M^2
EST. GFR  (NON AFRICAN AMERICAN): >60 ML/MIN/1.73 M^2
EST. GFR  (NON AFRICAN AMERICAN): >60 ML/MIN/1.73 M^2
GLUCOSE SERPL-MCNC: 125 MG/DL
GLUCOSE SERPL-MCNC: 125 MG/DL
HCT VFR BLD AUTO: 28.7 %
HCT VFR BLD AUTO: 29.7 %
HCT VFR BLD AUTO: 30.8 %
HCT VFR BLD AUTO: 32 %
HGB BLD-MCNC: 10.2 G/DL
HGB BLD-MCNC: 10.4 G/DL
HGB BLD-MCNC: 10.9 G/DL
HGB BLD-MCNC: 9.9 G/DL
INR PPP: 1.8
INR PPP: 1.8
INR PPP: 1.9
LYMPHOCYTES # BLD AUTO: 0.9 K/UL
LYMPHOCYTES # BLD AUTO: 1.4 K/UL
LYMPHOCYTES # BLD AUTO: 1.6 K/UL
LYMPHOCYTES # BLD AUTO: 1.9 K/UL
LYMPHOCYTES # BLD AUTO: 1.9 K/UL
LYMPHOCYTES NFR BLD: 19.1 %
LYMPHOCYTES NFR BLD: 20.9 %
LYMPHOCYTES NFR BLD: 21.4 %
LYMPHOCYTES NFR BLD: 29.5 %
LYMPHOCYTES NFR BLD: 29.5 %
MAGNESIUM SERPL-MCNC: 1.6 MG/DL
MCH RBC QN AUTO: 32.4 PG
MCH RBC QN AUTO: 32.4 PG
MCH RBC QN AUTO: 32.6 PG
MCH RBC QN AUTO: 32.7 PG
MCH RBC QN AUTO: 32.7 PG
MCHC RBC AUTO-ENTMCNC: 33.8 %
MCHC RBC AUTO-ENTMCNC: 34.1 %
MCHC RBC AUTO-ENTMCNC: 34.1 %
MCHC RBC AUTO-ENTMCNC: 34.3 %
MCHC RBC AUTO-ENTMCNC: 34.5 %
MCV RBC AUTO: 94 FL
MCV RBC AUTO: 94 FL
MCV RBC AUTO: 96 FL
MONOCYTES # BLD AUTO: 0.4 K/UL
MONOCYTES # BLD AUTO: 0.6 K/UL
MONOCYTES # BLD AUTO: 0.6 K/UL
MONOCYTES # BLD AUTO: 0.9 K/UL
MONOCYTES # BLD AUTO: 1.1 K/UL
MONOCYTES NFR BLD: 12.7 %
MONOCYTES NFR BLD: 14.3 %
MONOCYTES NFR BLD: 8.5 %
MONOCYTES NFR BLD: 9.3 %
MONOCYTES NFR BLD: 9.3 %
NEUTROPHILS # BLD AUTO: 3.3 K/UL
NEUTROPHILS # BLD AUTO: 3.6 K/UL
NEUTROPHILS # BLD AUTO: 3.6 K/UL
NEUTROPHILS # BLD AUTO: 4.3 K/UL
NEUTROPHILS # BLD AUTO: 4.6 K/UL
NEUTROPHILS NFR BLD: 56 %
NEUTROPHILS NFR BLD: 56 %
NEUTROPHILS NFR BLD: 60.9 %
NEUTROPHILS NFR BLD: 63.2 %
NEUTROPHILS NFR BLD: 69.9 %
PLATELET # BLD AUTO: 53 K/UL
PLATELET # BLD AUTO: 55 K/UL
PLATELET # BLD AUTO: 56 K/UL
PLATELET # BLD AUTO: 64 K/UL
PLATELET # BLD AUTO: 64 K/UL
PMV BLD AUTO: 10 FL
PMV BLD AUTO: 10.1 FL
PMV BLD AUTO: 9.5 FL
PMV BLD AUTO: 9.5 FL
PMV BLD AUTO: 9.8 FL
POCT GLUCOSE: 138 MG/DL (ref 70–110)
POTASSIUM SERPL-SCNC: 3.5 MMOL/L
POTASSIUM SERPL-SCNC: 3.5 MMOL/L
PROT SERPL-MCNC: 7.2 G/DL
PROT SERPL-MCNC: 7.2 G/DL
PROTHROMBIN TIME: 18.5 SEC
PROTHROMBIN TIME: 18.5 SEC
PROTHROMBIN TIME: 18.7 SEC
RBC # BLD AUTO: 3.04 M/UL
RBC # BLD AUTO: 3.15 M/UL
RBC # BLD AUTO: 3.21 M/UL
RBC # BLD AUTO: 3.33 M/UL
RBC # BLD AUTO: 3.33 M/UL
SODIUM SERPL-SCNC: 141 MMOL/L
SODIUM SERPL-SCNC: 141 MMOL/L
WBC # BLD AUTO: 4.7 K/UL
WBC # BLD AUTO: 6.43 K/UL
WBC # BLD AUTO: 6.43 K/UL
WBC # BLD AUTO: 6.83 K/UL
WBC # BLD AUTO: 7.48 K/UL

## 2017-01-21 PROCEDURE — 99233 SBSQ HOSP IP/OBS HIGH 50: CPT | Mod: GC,,, | Performed by: INTERNAL MEDICINE

## 2017-01-21 PROCEDURE — 36415 COLL VENOUS BLD VENIPUNCTURE: CPT

## 2017-01-21 PROCEDURE — 25000003 PHARM REV CODE 250: Performed by: STUDENT IN AN ORGANIZED HEALTH CARE EDUCATION/TRAINING PROGRAM

## 2017-01-21 PROCEDURE — 99232 SBSQ HOSP IP/OBS MODERATE 35: CPT | Mod: GC,,, | Performed by: INTERNAL MEDICINE

## 2017-01-21 PROCEDURE — 80053 COMPREHEN METABOLIC PANEL: CPT

## 2017-01-21 PROCEDURE — 83735 ASSAY OF MAGNESIUM: CPT

## 2017-01-21 PROCEDURE — 63600175 PHARM REV CODE 636 W HCPCS: Performed by: STUDENT IN AN ORGANIZED HEALTH CARE EDUCATION/TRAINING PROGRAM

## 2017-01-21 PROCEDURE — 85025 COMPLETE CBC W/AUTO DIFF WBC: CPT | Mod: 91

## 2017-01-21 PROCEDURE — 86850 RBC ANTIBODY SCREEN: CPT

## 2017-01-21 PROCEDURE — 85610 PROTHROMBIN TIME: CPT | Mod: 91

## 2017-01-21 PROCEDURE — 11000001 HC ACUTE MED/SURG PRIVATE ROOM

## 2017-01-21 PROCEDURE — 85610 PROTHROMBIN TIME: CPT

## 2017-01-21 PROCEDURE — 86920 COMPATIBILITY TEST SPIN: CPT

## 2017-01-21 PROCEDURE — 25000003 PHARM REV CODE 250: Performed by: INTERNAL MEDICINE

## 2017-01-21 PROCEDURE — C9113 INJ PANTOPRAZOLE SODIUM, VIA: HCPCS | Performed by: STUDENT IN AN ORGANIZED HEALTH CARE EDUCATION/TRAINING PROGRAM

## 2017-01-21 RX ORDER — HYDROCODONE BITARTRATE AND ACETAMINOPHEN 500; 5 MG/1; MG/1
TABLET ORAL
Status: DISCONTINUED | OUTPATIENT
Start: 2017-01-21 | End: 2017-01-23

## 2017-01-21 RX ORDER — ONDANSETRON HYDROCHLORIDE 4 MG/5ML
4 SOLUTION ORAL EVERY 8 HOURS PRN
Status: DISCONTINUED | OUTPATIENT
Start: 2017-01-21 | End: 2017-01-26 | Stop reason: HOSPADM

## 2017-01-21 RX ORDER — PANTOPRAZOLE SODIUM 40 MG/10ML
80 INJECTION, POWDER, LYOPHILIZED, FOR SOLUTION INTRAVENOUS ONCE
Status: DISCONTINUED | OUTPATIENT
Start: 2017-01-21 | End: 2017-01-21

## 2017-01-21 RX ORDER — OCTREOTIDE ACETATE 100 UG/ML
50 INJECTION, SOLUTION INTRAVENOUS; SUBCUTANEOUS ONCE
Status: COMPLETED | OUTPATIENT
Start: 2017-01-21 | End: 2017-01-21

## 2017-01-21 RX ORDER — ONDANSETRON 2 MG/ML
4 INJECTION INTRAMUSCULAR; INTRAVENOUS ONCE
Status: COMPLETED | OUTPATIENT
Start: 2017-01-21 | End: 2017-01-21

## 2017-01-21 RX ORDER — PANTOPRAZOLE SODIUM 40 MG/10ML
80 INJECTION, POWDER, LYOPHILIZED, FOR SOLUTION INTRAVENOUS ONCE
Status: COMPLETED | OUTPATIENT
Start: 2017-01-21 | End: 2017-01-21

## 2017-01-21 RX ADMIN — PANTOPRAZOLE SODIUM 80 MG: 40 INJECTION, POWDER, FOR SOLUTION INTRAVENOUS at 05:01

## 2017-01-21 RX ADMIN — RAMELTEON 8 MG: 8 TABLET, FILM COATED ORAL at 10:01

## 2017-01-21 RX ADMIN — LACTULOSE 20 G: 10 SOLUTION ORAL at 12:01

## 2017-01-21 RX ADMIN — OCTREOTIDE ACETATE 50 MCG/HR: 50 INJECTION, SOLUTION INTRAVENOUS; SUBCUTANEOUS at 05:01

## 2017-01-21 RX ADMIN — LACTULOSE 20 G: 10 SOLUTION ORAL at 03:01

## 2017-01-21 RX ADMIN — Medication 8 MG/HR: at 11:01

## 2017-01-21 RX ADMIN — PHYTONADIONE 10 MG: 10 INJECTION, EMULSION INTRAMUSCULAR; INTRAVENOUS; SUBCUTANEOUS at 09:01

## 2017-01-21 RX ADMIN — OCTREOTIDE ACETATE 50 MCG: 100 INJECTION, SOLUTION INTRAVENOUS; SUBCUTANEOUS at 05:01

## 2017-01-21 RX ADMIN — CLOTRIMAZOLE 10 MG: 10 LOZENGE ORAL at 02:01

## 2017-01-21 RX ADMIN — OCTREOTIDE ACETATE 50 MCG/HR: 50 INJECTION, SOLUTION INTRAVENOUS; SUBCUTANEOUS at 09:01

## 2017-01-21 RX ADMIN — Medication 8 MG/HR: at 05:01

## 2017-01-21 RX ADMIN — Medication 8 MG/HR: at 09:01

## 2017-01-21 RX ADMIN — ONDANSETRON 4 MG: 2 INJECTION INTRAMUSCULAR; INTRAVENOUS at 09:01

## 2017-01-21 RX ADMIN — CLOTRIMAZOLE 10 MG: 10 LOZENGE ORAL at 10:01

## 2017-01-21 NOTE — RESIDENT HANDOFF
"Handoff     Primary Team: Networked reference to record [facility]  Room Number: 1150/1150 A     Patient Name: Marely Hamilton MRN: [id number]     Date of Birth: [id number] Allergies: Sulfa (sulfonamide antibiotics)     Age: 51 y.o. Admit Date: 1/15/2017     Sex: female  BMI: Body mass index is 19.31 kg/(m^2).     Code Status: Full Code        Illness Level (current clinical status): Watcher - No    Reason for Admission: Hepatic encephalopathy    Brief HPI (pertinent PMH and diagnosis or differential diagnosis):   Marely Hamilton, 51 year female known to have Liver Cirrhosis and ESLD secondary to alcohol abuse, Bipolar disorder she was brought to ED by her mother after she noticed increasing confusion and skin discoloration of several days duration prior to admission.     When hospital medicine evaluated the patient at bedside, she was by herself and not accompanied by anyone, patient was not good historian and she couldn't reply the questions they were asking. Primary team called the mother Joy Anastasia [phone] and asked questions about her current presentation. So based on mothers' conversation over the phone she claimed that she was non complaint with her medication especially Lactulose and she did not keep track of her bowel movement on daily basis. Mother also mentioned that she was having some change in her basal mentation and when you ask her question and doesn't answer question and taking about her sister and cats and "doesn't make sense". Patient lives with her mother. Her mother noticed change in her face color and eyes color in the last couple of weeks more noticeably in the last couple of days. Mother denies if she observed any fever chills, or symptoms systemic infections and lethargy. Not clear when she drinks the last alcohol drink.     Hospital Course (updated, brief assessment by system or problem, significant events): On admission, GI was consulted for suspected variceal bleed after she had hematemesis " overnight but did not scope her due to suspicion of oral bleeding from tongue biting during her seizure (with loss of bowel and bladder incontinence). She had no more bowel movements and had stable Hgb with normal BUN. She was deemed an appropriate candidate for transfer back down to the floor given her clinical stability.      Tasks (specific, using if-then statements): Follow up psychiatry and GI recs.     Contingency Plan (special circumstances anticipated and plan): Please feel free to re consult us if there is evidence of active GI bleeding (ie increases in BMs, increasing BUN with normal sCr, black or blood BMs, or more hematemesis without evidence of tounge / soft palate lacerations with a dropping H&H)    Estimated Discharge Date: ---    Discharge Disposition: Home or Self Care    Mentored By: Dr. James

## 2017-01-21 NOTE — ASSESSMENT & PLAN NOTE
- was likely due to non compliance history of her medication - pt with history of alcoholic cirrhosis  - Ammonia at presentation was 51   - No clear etiology, differential diagnosis include but not limited to Hepatic Encephalopathy, medication overdose, deterioration of her ESLD, metabolic etiologies or infectious process.   -CT Head unremarkable  - Delirium precaution    Daytime: awake, up in chair as tolerated, tv on, window shades up, frequent reorientation.   Nighttime: in bed, minimize interruptions, tv off, window shades down.   - Consulted hepatology and they agreed on current plan and will disucss with patient when she is more clear and comprehensive.  - Hepatitis panel and HIV negative  - PETH negative   - Hepatology will decide for transplant work up with patient and her sister and they tried to reach her sister and communicate regarding liver transplant workup. If failed to reach sister will communicate mother. If no transplant workup will be initiated, palliative consult is imperative.   - Hepatology called her sister and she will come over the week and discuss the transplant work up  - Also Hepatology recommended to consult psychiatry given her crystal episode and being on lithium   - Started on long term vitamin treatment - will continue thiamine and folate  - Resumed lactulose on admission and aimed for bowel movement 3-4 times bowel movements; will hold lactulose and rifaximin and made pt NPO due to concern for esophageal varices    MELD-Na score: 21 at 1/21/2017  5:01 AM  MELD score: 21 at 1/21/2017  5:01 AM  Calculated from:  Serum Creatinine: 0.7 mg/dL (Rounded to 1) at 1/21/2017  5:01 AM  Serum Sodium: 141 mmol/L (Rounded to 137) at 1/21/2017  5:01 AM  Total Bilirubin: 7.5 mg/dL at 1/21/2017  5:01 AM  INR(ratio): 1.8 at 1/21/2017  5:01 AM  Age: 51 years

## 2017-01-21 NOTE — CONSULTS
GI Initial Consult H&P    Requesting service: IM  Reason for Consult: hematemesis    HPI:  Marely Hamilton is a 51 y.o. female hx of etoh cirrhosis and bipolar d/o admitted 1/15/17 for AMS who has developed hematemesis overnight and thus GI consulted.  Pt currently undergoing workup for altered mental status with heptology involvement. Overnight pt having 'seizure' like activity, pt was nonverbal and had loss of bowel and bladder continence. Pt found having bright red hematemesis.  On interview, pt is altered and cannot give history. She is repeating everything that i ask her. Sitter at bedside, says no more vomiting.  Overnight, MELD 21. Hgb 10.9. INR 1.8 and Plt 64.Bun is 7, with Cr 0.7.      Endoscopic Hx:  Unknown to me and unable to obtain    Review of Systems:     Unable to obtain 2/2 clinical condition / AMS      Past Medical History   Diagnosis Date    Alcohol abuse, in remission 6/15/2015     14.5 weeks ago; AA weekly    Anemia     Anxiety 6/15/2015    Behavioral problem     Bipolar disorder     Bipolar disorder in remission 6/15/2015    Cirrhosis, Laennec's 6/15/2015    Depression     Encounter for blood transfusion     Epistaxis 6/15/2015    Fatigue     Glaucoma     Hematuria     Hepatic encephalopathy 6/15/2015    Hepatic enlargement     History of psychiatric care     History of psychiatric hospitalization     History of seizure 6/15/2015     1    hx of intentional Tylenol overdose 6/15/2015     2005- situational and hx of bipolar    Jeana     Osteoarthritis     Other ascites 6/15/2015    Psychiatric problem     Renal disorder     Seizures     Self-harming behavior     Suicide attempt     Therapy     Thrombocytopenia 6/15/2015       Past Surgical History   Procedure Laterality Date    Esophagogastroduodenoscopy      Cosmetic surgery         Family History   Problem Relation Age of Onset    Alcohol abuse Father     Suicide Father     Bipolar disorder Father     Alcohol  "abuse Sister     Hypertension Mother     Alcohol abuse Paternal Grandfather     Drug abuse Neg Hx     Dementia Neg Hx        Review of patient's allergies indicates:   Allergen Reactions    Sulfa (sulfonamide antibiotics) Rash       Social History     Social History    Marital status: Single     Spouse name: N/A    Number of children: N/A    Years of education: N/A     Occupational History    Disabled Disabled     Social History Main Topics    Smoking status: Never Smoker    Smokeless tobacco: None    Alcohol use No      Comment: pt denies at this time    Drug use: No    Sexual activity: Not Currently     Other Topics Concern    None     Social History Narrative    Pt has 1 older and 1 younger sister.  Her father committed suicide when she was 18.  She has a [employer] degree and worked as an , but she has been disabled since age 40.  She never  and has no children.  She is not dating and claims no current friends.  She currently lives with her mother along with her pet dog.  She denies any hobbies and says she is not Yarsanism.       Medications:     clotrimazole  10 mg Oral TID    folic acid  1,000 mcg Oral Daily    lactulose  20 g Oral Q3H    phytonadione ((AQUA-MEPHYTON) IVPB  10 mg Intravenous Daily    rifAXIMimin  550 mg Oral BID    thiamine  100 mg Oral Daily       Physical exam:  Visit Vitals    BP (!) 154/75    Pulse 91    Temp 97.8 °F (36.6 °C) (Axillary)    Resp 16    Ht 5' 3" (1.6 m)    Wt 49.4 kg (109 lb)    SpO2 95%    Breastfeeding No    BMI 19.31 kg/m2       Gen: alert but altered and repeating phrases ;   HEENT: sclera mild icteric ; pinpoint pupils; wandering gaze ; tongue and lips with dried dark blood.  Chest: non-labored breathing ; symmetrical expansion  CV: RRR ; pulses intact  Abd: soft, NT , ND ; active BS ; no organomegaly  Ext: trace LE edema  Skin: no rashes,  or lesions  ; mild jaundice  Pscyh:altered , nonsensical speech   Neuro: alert but not " oriented ; unable to cooperate with neuro exam ;pinpoint pupils but symmetric        Recent Labs  Lab 01/21/17  0501   WBC 6.43  6.43   RBC 3.33*  3.33*   HGB 10.9*  10.9*  10.9*   HCT 32.0*  32.0*  32.0*   PLT 64*  64*   MCV 96  96   MCH 32.7*  32.7*   MCHC 34.1  34.1       Recent Labs  Lab 01/21/17  0501   CALCIUM 9.2  9.2   PROT 7.2  7.2     141   K 3.5  3.5   CO2 16*  16*     109   BUN 7  7   CREATININE 0.7  0.7   ALKPHOS 235*  235*   ALT 51*  51*   AST 94*  94*   BILITOT 7.5*  7.5*       Recent Labs  Lab 01/21/17  0501   INR 1.8*  1.8*       MELD-Na score: 21 at 1/21/2017  5:01 AM  MELD score: 21 at 1/21/2017  5:01 AM  Calculated from:  Serum Creatinine: 0.7 mg/dL (Rounded to 1) at 1/21/2017  5:01 AM  Serum Sodium: 141 mmol/L (Rounded to 137) at 1/21/2017  5:01 AM  Total Bilirubin: 7.5 mg/dL at 1/21/2017  5:01 AM  INR(ratio): 1.8 at 1/21/2017  5:01 AM  Age: 51 years          Assessment:  Marely Hamilton is a 51 y.o. female with etoh cirrhosis and altered mental status who developed hematemsis overnight.   Currently HDS but severely altered, which does not seem to be a change from prior.   Given normal BUN, stable Hgb without further episodes or without BMs, we suspect this may have been oral bleeding from tongue biting during her seizure (with loss of bowel / bladder continence).  Given risk however, would continue treatment with octreotide, ppi, and rocephin.    Recs:  Protonix 80mg IV bolus x1 and then gtt at 8mg/hr ; Octreotide gtt at 50mcg/hr  Ceftriaxone 1g IV Q daily for primary SBP prophylaxis  Intravascular resuscitation/support with IVFs ; Serial H/H's and pRBCs transfusion as indicated  Discontinue all NSAIDs and Heparin products  Please correct any coagulopathy with platelets and FFP to a goal of platelets >50K and INR <2.0  Maintain IV access with 2 large bore IVs  No plan for EGD currently  - can start clear liquids     IF developed hematemesis or overt blood in  stool, please contact GI on call  Please notify GI team if there is significant change in patient's clinical status    Please call with any additional concerns/ questions.    Jermain Youssef MD  Gastroenterology Fellow ([id number])  Pager: [phone]

## 2017-01-21 NOTE — H&P
"[facility]  Critical Care Medicine  Progress Note    Patient Name: Marely Hamilton  MRN: [id number]  Admission Date: 1/15/2017  Hospital Length of Stay: 6 days  Code Status: Full Code  Attending Provider: Katy James MD  Primary Care Provider: Viktor Ross MD   Principal Problem: Hepatic encephalopathy    Subjective:     HPI:  Marely Hamilton, 51 year female known to have Liver Cirrhosis and ESLD secondary to alcohol abuse, Bipolar disorder she was brought to ED by her mother after she noticed increasing confusion and skin discoloration of several days duration prior to admission.    When hospital medicine evaluated the patient at bedside, she was by herself and not accompanied by anyone, patient was not good historian and she couldn't reply the questions they were asking. Primary team called the mother Joy Oconnor [phone] and asked questions about her current presentation. So based on mothers' conversation over the phone she claimed that she was non complaint with her medication especially Lactulose and she did not keep track of her bowel movement on daily basis. Mother also mentioned that she was having some change in her basal mentation and when you ask her question and doesn't answer question and taking about her sister and cats and "doesn't make sense". Patient lives with her mother. Her mother noticed change in her face color and eyes color in the last couple of weeks more noticeably in the last couple of days. Mother denies if she observed any fever chills, or symptoms systemic infections and lethargy. Not clear when she drinks the last alcohol drink.            Hospital/ICU Course:  Patient admitted to hospital medicine and treated for hepatic encephalopathy - per notes, pt improved on PO lactulose and evaluated by liver transplant and psychiatry. ICU consulted on 1/21/17 after pt had 1 episode of hematemesis with 70cc of bright red blood suctioned. Pt initially tachycardic to " 105 and hypertensive to the 170's but remained HDS after with no further episodes of hematemesis. Pt started on octreotide and pantoprazole gtt's due to concern for esophageal varices given pt's history of alcoholic cirrhosis and transferred to ICU. GI consulted and plan to scope later this AM.    Interval History/Significant Events: Please see hospital course    Review of Systems   Constitutional: Positive for fatigue.   Respiratory: Negative for cough and shortness of breath.    Cardiovascular: Negative for chest pain.   Gastrointestinal: Positive for vomiting. Negative for abdominal pain and blood in stool.   Neurological: Positive for dizziness, weakness and light-headedness.     Objective:     Vital Signs (Most Recent):  Temp: 97.8 °F (36.6 °C) (01/21/17 0455)  Pulse: 91 (01/21/17 0530)  Resp: 16 (01/21/17 0530)  BP: (!) 154/75 (01/21/17 0530)  SpO2: 95 % (01/21/17 0530) Vital Signs (24h Range):  Temp:  [97.8 °F (36.6 °C)-98.4 °F (36.9 °C)] 97.8 °F (36.6 °C)  Pulse:  [] 91  Resp:  [16-18] 16  SpO2:  [92 %-98 %] 95 %  BP: (116-177)/(65-81) 154/75   Weight: 49.4 kg (109 lb)  Body mass index is 19.31 kg/(m^2).    No intake or output data in the 24 hours ending 01/21/17 0623    Physical Exam   Constitutional: She is oriented to person, place, and time. She appears ill.   HENT:   Head: Normocephalic and atraumatic.   Pt with dried blood around mouth    Eyes: Pupils are equal, round, and reactive to light. Scleral icterus is present.   Neck: Normal range of motion. Neck supple.   Cardiovascular: Regular rhythm.  Tachycardia present.    No murmur heard.  Pulmonary/Chest: Effort normal. No respiratory distress.   Abdominal: Soft. She exhibits no distension. There is no tenderness.   Musculoskeletal: She exhibits no edema.   Decreased strength in all 4 extremities   Neurological: She is alert and oriented to person, place, and time.   Skin:   Jaundiced skin       Vents:  Oxygen Concentration (%): 100 (01/17/17  1600)  Lines/Drains/Airways     Peripheral Intravenous Line                 Peripheral IV - Single Lumen 01/21/17 0521 Left Forearm less than 1 day         Peripheral IV - Single Lumen 01/21/17 0522 Right Forearm less than 1 day              Significant Labs:    CBC/Anemia Profile:    Recent Labs  Lab 01/21/17  0501   WBC 6.43  6.43   HGB 10.9*  10.9*  10.9*   HCT 32.0*  32.0*  32.0*   PLT 64*  64*   MCV 96  96   RDW 17.1*  17.1*        Chemistries:    Recent Labs  Lab 01/20/17  0504 01/21/17  0501    141  141   K 4.0 3.5  3.5   * 109  109   CO2 22* 16*  16*   BUN 5* 7  7   CREATININE 0.7 0.7  0.7   CALCIUM 8.9 9.2  9.2   ALBUMIN 3.1* 3.3*  3.3*   PROT 6.6 7.2  7.2   BILITOT 7.7* 7.5*  7.5*   ALKPHOS 172* 235*  235*   ALT 46* 51*  51*   AST 85* 94*  94*   MG 1.8 1.6       Coagulation:   Recent Labs  Lab 01/21/17  0501   INR 1.8*  1.8*       Significant Imaging:  I have reviewed all pertinent imaging results/findings within the past 24 hours.   US Liver with Doppler 1/15/17  -Small cirrhotic liver.    -Splenomegaly.  -Cholelithiasis.  Mild gallbladder wall thickening without evidence of hyperemia or pericholecystic fluid collection.  Sonographic Tang's sign is negative.  Gallbladder wall thickening may be secondary to nondistention or underlying liver disease.  Correlation with symptomatology for acute cholecystitis is recommended.    US Abdomen 1/15/17  -Several gallbladder stones measuring up to 1.1 cm with diffuse wall thickening.  Sonographic Tang's sign is negative, and no pericholecystic fluid or biliary ductal dilatation.  Findings are nonspecific but suggest a more chronic process such as secondary thickening from intrinsic liver disease or hypoproteinemia, with sequela of chronic cholecystitis not entirely excluded.  -Dilatation of the portal and splenic veins suggesting underlying elevated portal venous pressures.    CT Head 1/15/17  -No acute intracranial  abnormalities.    Assessment/Plan:     Neuro  * Hepatic encephalopathy  - was likely due to non compliance history of her medication - pt with history of alcoholic cirrhosis  - Ammonia at presentation was 50   - No clear etiology, differential diagnosis include but not limited to Hepatic Encephalopathy, medication overdose, deterioration of her ESLD, metabolic etiologies or infectious process.   -CT Head unremarkable  - Delirium precaution    Daytime: awake, up in chair as tolerated, tv on, window shades up, frequent reorientation.   Nighttime: in bed, minimize interruptions, tv off, window shades down.   - Consulted hepatology and they agreed on current plan and will disucss with patient when she is more clear and comprehensive.  - Hepatitis panel and HIV negative  - PETH negative   - Hepatology will decide for transplant work up with patient and her sister and they tried to reach her sister and communicate regarding liver transplant workup. If failed to reach sister will communicate mother. If no transplant workup will be initiated, palliative consult is imperative.   - Hepatology called her sister and she will come over the week and discuss the transplant work up  - Also Hepatology recommended to consult psychiatry given her crystal episode and being on lithium   - Started on long term vitamin treatment - will continue thiamine and folate  - Resumed lactulose on admission and aimed for bowel movement 3-4 times bowel movements; will hold lactulose and rifaximin and made pt NPO due to concern for esophageal varices    MELD-Na score: 21 at 1/21/2017  5:01 AM  MELD score: 21 at 1/21/2017  5:01 AM  Calculated from:  Serum Creatinine: 0.7 mg/dL (Rounded to 1) at 1/21/2017  5:01 AM  Serum Sodium: 141 mmol/L (Rounded to 137) at 1/21/2017  5:01 AM  Total Bilirubin: 7.5 mg/dL at 1/21/2017  5:01 AM  INR(ratio): 1.8 at 1/21/2017  5:01 AM  Age: 51 years      Bipolar disorder in remission  - Hospitalized for crystal and psychosis  x 2; also has had depression (total length 4) (duration 3 wks and 1.5 weeks with the others)  - On lithium daily; psych recommended to hold Lithium for now as renal function declining and high risk for seizure in lieu of pts alcohol disorder and high home dose of Klonopin 2mg QID in addition to autonomic instability  -Will restart Klonopin 1mg BID with PRN coverage of Ativan 2mg po/im q4 for Benzo and alcohol withdrawals. May consider Risperdal 0.5 qhs if QTC<460. D/C PRN upon admission to ICU and pt NPO for scope today    Fluids/Electrolytes/Nutrition/GI  Cirrhosis, Laennec's  - Cirrhosis secondary to alcohol abuse  - Review principal problem for more elaboration   - No fluid to be tapped in her ascitic fluid  - Consulted Hepatology and they recommend to continue lactulose and rifaximin - currently holding as pt NPO for scope today     Hematemesis  -pt with 1 episode of hematemesis at approximately 0440 on 1/21/17 with 70cc of bright red blood suctioned  -concern for esophageal varices given pt's history of alcoholic cirrhosis; pt with no prior EGD as she refused it in the past per chart review. Only documentation of esophageal varices is in a discharge summary from 2015. US from 1/15/17 showed dilatation of the portal and splenic veins suggesting underlying elevated portal venous pressures.  -ordered CXray  -placed 2 large bore (18guage) peripheral IV   -pt started on octreotide and pantoprazole gtt  -Hb/Hct at 10.9/32.0 -- will consider trending q6h  -INR 1.8, will give Vitamin K IV daily for 3 days  -ordered type&screen, prepped 1U PRBC  -IV anti-emetics PRN  -consulted GI, will scope later today     Other  Thrombocytopenia  -pt's platelet count at 64  -likely 2/2 to to her chronic liver disease and hypersplenism     Critical Care Medicine Daily Checklist:    A: Awake: RASS Goal/Actual Goal:    Actual:     B: Spontaneous Breathing Trial Performed?  n/a   C: SAT & SBT Coordinated?  n/a                    D:  Delirium: CAM-ICU  negative   E: Early Mobility Performed? no   F: Feeding Goal: Goals: Maintain meal intake >/=75%  Status: Nutrition Goal Status: progressing towards goal   Current Diet Order   Procedures    Diet NPO      AS: Analgesia/Sedation n/a   T: Thromboembolic Prophylaxis n/a   H: HOB > 300 yes   U: Stress Ulcer Prophylaxis (if needed) Pantoprazole gtt   G: Glucose Control LDSSI   B: Bowel Function Stool Occurrence: 1   I: Indwelling Catheter (Lines & Saldaña) Necessity 2 18 gauge peripheral IV   D: De-escalation of Antimicrobials/Pharmacotherapies n/a    Plan for the day/ETD EGD    Code Status:  Family/Goals of Care: Full Code          Kevin Rolon MD  Critical Care Medicine  [facility]

## 2017-01-21 NOTE — PROGRESS NOTES
"[facility]  Critical Care Medicine  Progress Note    Patient Name: Marely Hamilton  MRN: [id number]  Admission Date: 1/15/2017  Hospital Length of Stay: 6 days  Code Status: Full Code  Attending Provider: Katy James MD  Primary Care Provider: Viktor Ross MD   Principal Problem: Hepatic encephalopathy    Subjective:     HPI:  Marely Hamilton, 51 year female known to have Liver Cirrhosis and ESLD secondary to alcohol abuse, Bipolar disorder she was brought to ED by her mother after she noticed increasing confusion and skin discoloration of several days duration prior to admission.    When hospital medicine evaluated the patient at bedside, she was by herself and not accompanied by anyone, patient was not good historian and she couldn't reply the questions they were asking. Primary team called the mother Joy Oconnor [phone] and asked questions about her current presentation. So based on mothers' conversation over the phone she claimed that she was non complaint with her medication especially Lactulose and she did not keep track of her bowel movement on daily basis. Mother also mentioned that she was having some change in her basal mentation and when you ask her question and doesn't answer question and taking about her sister and cats and "doesn't make sense". Patient lives with her mother. Her mother noticed change in her face color and eyes color in the last couple of weeks more noticeably in the last couple of days. Mother denies if she observed any fever chills, or symptoms systemic infections and lethargy. Not clear when she drinks the last alcohol drink.            Hospital/ICU Course:  Patient admitted to hospital medicine and treated for hepatic encephalopathy - per notes, pt improved on PO lactulose and evaluated by liver transplant and psychiatry. ICU consulted on 1/21/17 after pt had 1 episode of hematemesis with 70cc of bright red blood suctioned. Pt initially tachycardic to " 105 and hypertensive to the 170's but remained HDS after with no further episodes of hematemesis. Pt started on octreotide and pantoprazole gtt's due to concern for esophageal varices given pt's history of alcoholic cirrhosis and transferred to ICU. GI consulted and plan to scope later this AM.      Assessment/Plan:     Neuro  * Hepatic encephalopathy  - was likely due to non compliance history of her medication - pt with history of alcoholic cirrhosis  - Ammonia at presentation was 50   - No clear etiology, differential diagnosis include but not limited to Hepatic Encephalopathy, medication overdose, deterioration of her ESLD, metabolic etiologies or infectious process.   -CT Head unremarkable  - Delirium precaution    Daytime: awake, up in chair as tolerated, tv on, window shades up, frequent reorientation.   Nighttime: in bed, minimize interruptions, tv off, window shades down.   - Consulted hepatology and they agreed on current plan and will disucss with patient when she is more clear and comprehensive.  - Hepatitis panel and HIV negative  - PETH negative   - Hepatology will decide for transplant work up with patient and her sister and they tried to reach her sister and communicate regarding liver transplant workup. If failed to reach sister will communicate mother. If no transplant workup will be initiated, palliative consult is imperative.   - Hepatology called her sister and she will come over the week and discuss the transplant work up  - Also Hepatology recommended to consult psychiatry given her crystal episode and being on lithium   - Started on long term vitamin treatment - will continue thiamine and folate  - Resumed lactulose on admission and aimed for bowel movement 3-4 times bowel movements; will hold lactulose and rifaximin and made pt NPO due to concern for esophageal varices    MELD-Na score: 21 at 1/21/2017  5:01 AM  MELD score: 21 at 1/21/2017  5:01 AM  Calculated from:  Serum Creatinine: 0.7  mg/dL (Rounded to 1) at 1/21/2017  5:01 AM  Serum Sodium: 141 mmol/L (Rounded to 137) at 1/21/2017  5:01 AM  Total Bilirubin: 7.5 mg/dL at 1/21/2017  5:01 AM  INR(ratio): 1.8 at 1/21/2017  5:01 AM  Age: 51 years      Bipolar disorder in remission  - Hospitalized for crystal and psychosis x 2; also has had depression (total length 4) (duration 3 wks and 1.5 weeks with the others)  - On lithium daily; psych recommended to hold Lithium for now as renal function declining and high risk for seizure in lieu of pts alcohol disorder and high home dose of Klonopin 2mg QID in addition to autonomic instability  -Will restart Klonopin 1mg BID with PRN coverage of Ativan 2mg po/im q4 for Benzo and alcohol withdrawals. May consider Risperdal 0.5 qhs if QTC<460. D/C PRN upon admission to ICU and pt NPO for scope today    Fluids/Electrolytes/Nutrition/GI  Cirrhosis, Laennec's  - Cirrhosis secondary to alcohol abuse  - Review principal problem for more elaboration   - No fluid to be tapped in her ascitic fluid  - Consulted Hepatology and they recommend to continue lactulose and rifaximin - currently holding as pt NPO for scope today     Hematemesis  -pt with 1 episode of hematemesis at approximately 0440 on 1/21/17 with 70cc of bright red blood suctioned  -concern for esophageal varices given pt's history of alcoholic cirrhosis; pt with no prior EGD as she refused it in the past per chart review. Only documentation of esophageal varices is in a discharge summary from 2015. US from 1/15/17 showed dilatation of the portal and splenic veins suggesting underlying elevated portal venous pressures.  -ordered CXray  -placed 2 large bore (18guage) peripheral IV   -pt started on octreotide and pantoprazole gtt  -Hb/Hct at 10.9/32.0 -- will consider trending q6h  -INR 1.8, will give Vitamin K IV daily for 3 days  -ordered type&screen, prepped 1U PRBC  -IV anti-emetics PRN  -consulted GI, will scope later today     Other  Thrombocytopenia  -pt's  platelet count at 64  -likely 2/2 to to her chronic liver disease and hypersplenism     Critical Care Medicine Daily Checklist:    A: Awake: RASS Goal/Actual Goal:    Actual:     B: Spontaneous Breathing Trial Performed?  n/a   C: SAT & SBT Coordinated?  n/a                      D: Delirium: CAM-ICU     E: Early Mobility Performed? no   F: Feeding Goal: Goals: Maintain meal intake >/=75%  Status: Nutrition Goal Status: progressing towards goal   Current Diet Order   Procedures    Diet NPO      AS: Analgesia/Sedation n/a   T: Thromboembolic Prophylaxis SINCERE. SCD.    H: HOB > 300 no   U: Stress Ulcer Prophylaxis (if needed) Pantoprazole gtt   G: Glucose Control LDSSI   B: Bowel Function Stool Occurrence: 1   I: Indwelling Catheter (Lines & Saldaña) Necessity 2 18gauge peripheral IV   D: De-escalation of Antimicrobials/Pharmacotherapies n/a    Plan for the day/ETD EGD    Code Status:  Family/Goals of Care: Full Code          Kevin Rolon MD  Critical Care Medicine  [facility] [address]

## 2017-01-21 NOTE — NURSING
"Called to room for pt having "seizure"by [employer] tech. Upon entering room pt nonverbal and vomiting bright red blood. Pt had a loss of bowel and bladder during this time. Pt suctioned at the bedside and Vitals were taken. Rapid response was called. Charge nurse in room. Pt assessed by ICU team.  and . Pt had CBC CMP PT INR T&S drawn. Accucheck normal Pt sats in high 90s on RA. Pt given Octreotide and Protonix IV. 2 18gauges started. No oral meds to be given at this time per ICU team in case of varicies. Will monitor.   "

## 2017-01-21 NOTE — PROGRESS NOTES
"1/21/2017 5:59 PM   Marely Hamilton   1965   [id number]        Psychiatry Progress Note     SUBJECTIVE:   Patient seen and examined on rounds today in conjunction with student doctor Zulma.    Per Medical Student: Pt had an episode of hematemesis this morning. She was seen by GI and Critical Care teams and will likely have EGD today. Pt lying in bed with eyes closed upon entering. Pt was difficult to rouse. She would stare but would not respond to questions before closing her eyes and laying back down. She was unable to follow commands. At one point she woke up, pointed to her sock and her wrist bands and said, "Risk. Rich people low risk. Flying. Free. Arjun type shit." She then closed her eyes and put her head back down.     On my interview, Ms. Hamilton has a flat affect. She stares throughout the interview with very little facial expression. She is disorganized and RIS. She states that she wants me to explain to her mother about whether or not a tattoo is permanent on the interviewer's shoulder. I informed her that not only is her mother not in the room, but that I do not have a tattoo. She then responded to all other questions by reading seemingly random words off of the markerboard in her room. She intermittently refers to herself in the 3rd person.       Current Medications:   Scheduled Meds:    clotrimazole  10 mg Oral TID    folic acid  1,000 mcg Oral Daily    phytonadione ((AQUA-MEPHYTON) IVPB  10 mg Intravenous Daily    thiamine  100 mg Oral Daily      PRN Meds: sodium chloride, dextrose 50%, dextrose 50%, glucagon (human recombinant), glucose, glucose, ondansetron, ramelteon   Psychotherapeutics     Start     Stop Route Frequency Ordered    01/15/17 2050  olanzapine injection 2.5 mg      01/15 2359 IM Once as needed 01/15/17 1950    01/18/17 1609  ramelteon tablet 8 mg      -- Oral Nightly PRN 01/18/17 1509          Allergies:   Review of patient's allergies indicates:   Allergen Reactions    " Sulfa (sulfonamide antibiotics) Rash        OBJECTIVE:   Vitals   Vitals:    01/21/17 1600   BP: (!) 128/59   Pulse: 89   Resp: 17   Temp: 97.8 °F (36.6 °C)        Labs/Imaging/Studies:   Recent Results (from the past 36 hour(s))   TSH    Collection Time: 01/20/17 12:02 PM   Result Value Ref Range    TSH 0.538 0.400 - 4.000 uIU/mL   POCT glucose    Collection Time: 01/21/17  4:40 AM   Result Value Ref Range    POCT Glucose 138 (H) 70 - 110 mg/dL   Comprehensive Metabolic Panel (CMP)    Collection Time: 01/21/17  5:01 AM   Result Value Ref Range    Sodium 141 136 - 145 mmol/L    Potassium 3.5 3.5 - 5.1 mmol/L    Chloride 109 95 - 110 mmol/L    CO2 16 (L) 23 - 29 mmol/L    Glucose 125 (H) 70 - 110 mg/dL    BUN, Bld 7 6 - 20 mg/dL    Creatinine 0.7 0.5 - 1.4 mg/dL    Calcium 9.2 8.7 - 10.5 mg/dL    Total Protein 7.2 6.0 - 8.4 g/dL    Albumin 3.3 (L) 3.5 - 5.2 g/dL    Total Bilirubin 7.5 (H) 0.1 - 1.0 mg/dL    Alkaline Phosphatase 235 (H) 55 - 135 U/L    AST 94 (H) 10 - 40 U/L    ALT 51 (H) 10 - 44 U/L    Anion Gap 16 8 - 16 mmol/L    eGFR if African American >60.0 >60 mL/min/1.73 m^2    eGFR if non African American >60.0 >60 mL/min/1.73 m^2   PT/INR    Collection Time: 01/21/17  5:01 AM   Result Value Ref Range    Prothrombin Time 18.5 (H) 9.0 - 12.5 sec    INR 1.8 (H) 0.8 - 1.2   Magnesium    Collection Time: 01/21/17  5:01 AM   Result Value Ref Range    Magnesium 1.6 1.6 - 2.6 mg/dL   CBC auto differential    Collection Time: 01/21/17  5:01 AM   Result Value Ref Range    WBC 6.43 3.90 - 12.70 K/uL    RBC 3.33 (L) 4.00 - 5.40 M/uL    Hemoglobin 10.9 (L) 12.0 - 16.0 g/dL    Hematocrit 32.0 (L) 37.0 - 48.5 %    MCV 96 82 - 98 fL    MCH 32.7 (H) 27.0 - 31.0 pg    MCHC 34.1 32.0 - 36.0 %    RDW 17.1 (H) 11.5 - 14.5 %    Platelets 64 (L) 150 - 350 K/uL    MPV 9.5 9.2 - 12.9 fL    Gran # 3.6 1.8 - 7.7 K/uL    Lymph # 1.9 1.0 - 4.8 K/uL    Mono # 0.6 0.3 - 1.0 K/uL    Eos # 0.3 0.0 - 0.5 K/uL    Baso # 0.03 0.00 - 0.20 K/uL     Gran% 56.0 38.0 - 73.0 %    Lymph% 29.5 18.0 - 48.0 %    Mono% 9.3 4.0 - 15.0 %    Eosinophil% 4.4 0.0 - 8.0 %    Basophil% 0.5 0.0 - 1.9 %    Differential Method Automated    Hemoglobin    Collection Time: 01/21/17  5:01 AM   Result Value Ref Range    Hemoglobin 10.9 (L) 12.0 - 16.0 g/dL   Hematocrit    Collection Time: 01/21/17  5:01 AM   Result Value Ref Range    Hematocrit 32.0 (L) 37.0 - 48.5 %   CBC auto differential    Collection Time: 01/21/17  5:01 AM   Result Value Ref Range    WBC 6.43 3.90 - 12.70 K/uL    RBC 3.33 (L) 4.00 - 5.40 M/uL    Hemoglobin 10.9 (L) 12.0 - 16.0 g/dL    Hematocrit 32.0 (L) 37.0 - 48.5 %    MCV 96 82 - 98 fL    MCH 32.7 (H) 27.0 - 31.0 pg    MCHC 34.1 32.0 - 36.0 %    RDW 17.1 (H) 11.5 - 14.5 %    Platelets 64 (L) 150 - 350 K/uL    MPV 9.5 9.2 - 12.9 fL    Gran # 3.6 1.8 - 7.7 K/uL    Lymph # 1.9 1.0 - 4.8 K/uL    Mono # 0.6 0.3 - 1.0 K/uL    Eos # 0.3 0.0 - 0.5 K/uL    Baso # 0.03 0.00 - 0.20 K/uL    Gran% 56.0 38.0 - 73.0 %    Lymph% 29.5 18.0 - 48.0 %    Mono% 9.3 4.0 - 15.0 %    Eosinophil% 4.4 0.0 - 8.0 %    Basophil% 0.5 0.0 - 1.9 %    Differential Method Automated    Comprehensive metabolic panel    Collection Time: 01/21/17  5:01 AM   Result Value Ref Range    Sodium 141 136 - 145 mmol/L    Potassium 3.5 3.5 - 5.1 mmol/L    Chloride 109 95 - 110 mmol/L    CO2 16 (L) 23 - 29 mmol/L    Glucose 125 (H) 70 - 110 mg/dL    BUN, Bld 7 6 - 20 mg/dL    Creatinine 0.7 0.5 - 1.4 mg/dL    Calcium 9.2 8.7 - 10.5 mg/dL    Total Protein 7.2 6.0 - 8.4 g/dL    Albumin 3.3 (L) 3.5 - 5.2 g/dL    Total Bilirubin 7.5 (H) 0.1 - 1.0 mg/dL    Alkaline Phosphatase 235 (H) 55 - 135 U/L    AST 94 (H) 10 - 40 U/L    ALT 51 (H) 10 - 44 U/L    Anion Gap 16 8 - 16 mmol/L    eGFR if African American >60.0 >60 mL/min/1.73 m^2    eGFR if non African American >60.0 >60 mL/min/1.73 m^2   Protime-INR    Collection Time: 01/21/17  5:01 AM   Result Value Ref Range    Prothrombin Time 18.5 (H) 9.0 - 12.5 sec     INR 1.8 (H) 0.8 - 1.2   Type & Screen    Collection Time: 01/21/17  5:16 AM   Result Value Ref Range    Group & Rh O POS     Indirect Ameya NEG    Prepare RBC 1 Unit    Collection Time: 01/21/17  5:16 AM   Result Value Ref Range    UNIT NUMBER [id number]     PRODUCT CODE H7620T18     DISPENSE STATUS CROSSMATCHED     CODING SYSTEM UIRU916     Unit Blood Type Code 5100     Unit Blood Type O POS     Unit Expiration [id number]    CBC auto differential    Collection Time: 01/21/17  9:22 AM   Result Value Ref Range    WBC 4.70 3.90 - 12.70 K/uL    RBC 3.21 (L) 4.00 - 5.40 M/uL    Hemoglobin 10.4 (L) 12.0 - 16.0 g/dL    Hematocrit 30.8 (L) 37.0 - 48.5 %    MCV 96 82 - 98 fL    MCH 32.4 (H) 27.0 - 31.0 pg    MCHC 33.8 32.0 - 36.0 %    RDW 17.2 (H) 11.5 - 14.5 %    Platelets 55 (L) 150 - 350 K/uL    MPV 10.0 9.2 - 12.9 fL    Gran # 3.3 1.8 - 7.7 K/uL    Lymph # 0.9 (L) 1.0 - 4.8 K/uL    Mono # 0.4 0.3 - 1.0 K/uL    Eos # 0.1 0.0 - 0.5 K/uL    Baso # 0.01 0.00 - 0.20 K/uL    Gran% 69.9 38.0 - 73.0 %    Lymph% 19.1 18.0 - 48.0 %    Mono% 8.5 4.0 - 15.0 %    Eosinophil% 2.1 0.0 - 8.0 %    Basophil% 0.2 0.0 - 1.9 %    Differential Method Automated    Protime-INR    Collection Time: 01/21/17  9:22 AM   Result Value Ref Range    Prothrombin Time 18.7 (H) 9.0 - 12.5 sec    INR 1.9 (H) 0.8 - 1.2   CBC auto differential    Collection Time: 01/21/17 11:41 AM   Result Value Ref Range    WBC 6.83 3.90 - 12.70 K/uL    RBC 3.15 (L) 4.00 - 5.40 M/uL    Hemoglobin 10.2 (L) 12.0 - 16.0 g/dL    Hematocrit 29.7 (L) 37.0 - 48.5 %    MCV 94 82 - 98 fL    MCH 32.4 (H) 27.0 - 31.0 pg    MCHC 34.3 32.0 - 36.0 %    RDW 17.4 (H) 11.5 - 14.5 %    Platelets 56 (L) 150 - 350 K/uL    MPV 10.1 9.2 - 12.9 fL    Gran # 4.3 1.8 - 7.7 K/uL    Lymph # 1.4 1.0 - 4.8 K/uL    Mono # 0.9 0.3 - 1.0 K/uL    Eos # 0.2 0.0 - 0.5 K/uL    Baso # 0.03 0.00 - 0.20 K/uL    Gran% 63.2 38.0 - 73.0 %    Lymph% 20.9 18.0 - 48.0 %    Mono% 12.7 4.0 - 15.0 %     "Eosinophil% 2.5 0.0 - 8.0 %    Basophil% 0.4 0.0 - 1.9 %    Differential Method Automated    CBC auto differential    Collection Time: 01/21/17  3:54 PM   Result Value Ref Range    WBC 7.48 3.90 - 12.70 K/uL    RBC 3.04 (L) 4.00 - 5.40 M/uL    Hemoglobin 9.9 (L) 12.0 - 16.0 g/dL    Hematocrit 28.7 (L) 37.0 - 48.5 %    MCV 94 82 - 98 fL    MCH 32.6 (H) 27.0 - 31.0 pg    MCHC 34.5 32.0 - 36.0 %    RDW 17.3 (H) 11.5 - 14.5 %    Platelets 53 (L) 150 - 350 K/uL    MPV 9.8 9.2 - 12.9 fL    Gran # 4.6 1.8 - 7.7 K/uL    Lymph # 1.6 1.0 - 4.8 K/uL    Mono # 1.1 (H) 0.3 - 1.0 K/uL    Eos # 0.2 0.0 - 0.5 K/uL    Baso # 0.04 0.00 - 0.20 K/uL    Gran% 60.9 38.0 - 73.0 %    Lymph% 21.4 18.0 - 48.0 %    Mono% 14.3 4.0 - 15.0 %    Eosinophil% 2.5 0.0 - 8.0 %    Basophil% 0.5 0.0 - 1.9 %    Differential Method Automated         Mental Status Exam:   Arousal: awake, alert  Appearance: NAD, lying in bed  Sensorium/Orientation: disoriented to place, situation. Oriented to month and year  Grooming: disheveled  Behavior/Cooperation: Limited cooperation   Psychomotor: no PMA/PMR, some stereotyped movements present  Speech: increased latency of response, childlike tone   Language: english, but with inappropriate use of vocabulary  Mood: "good"   Affect: flat   Thought Process: disorganized   Thought Content: clang associations noted, loose associations noted, some possible confabulation as well. Denied SI/HI. +RIS and VH of mother in room  Associations: Loose associations present  Attention/Concentration: impaired and easily distracted  Memory: unable to assess  Fund of Knowledge: unable to assess   Insight: limited   Judgment: limited    ASSESSMENT/PLAN:   Bipolar Disorder type I  Alcohol Use Disorder     Recommendations:   - Pt does not meet criteria for involuntary psychiatric hospitalization; she is not suicidal, homicidal or gravely disabled. The described symptoms she experienced prior to being hospitalized are likely due " alcohol/benzo withdrawal rather than crystal as she was trying to cut back on etoh intake.  -She was prescribed klonopin 2mg po 4x day prior to entering the hospital. She is at risk of benzo withdrawal and has had episodes of tachycardia;   - Continue withdrawal precautions, monitor vital every 4 hours.    - Recommend scheduled Ativan 1mg Q12H Recommend Ativan 2mg po every 4 hours prn systolic blood pressure > 160, Diastolic Blood pressure >110, heart rate > 110, tremors, diaphoresis, or other signs of withdrawal.   - Previous recommendation was for scheduled clonazepam with PRN ativan. As the patient has liver damage, using clonazepam can result in increased levels in the blood as it is not cleared as quickly. Therefore we are recommending that the patient be placed on both scheduled and PRN Ativan.    -Recommend Lithium 300mg po QHS, she was unable to tolerate higher doses in the past.   - Patients EKG displayed QTc prolongation on 1/15/17 @ 503. Would recheck EKG, and if it is no longer prolonged (<460), consider risperdal 0.5mg po QHS to regulate sleep/wake cycle during delirium due to hepatic encephalopathy. This can also be used as a duel mood stabilizer. She has tolerated this medication in the past.   - Multivitamin, Thiamine, and Folate geovanny Vasquez M.D.  1/21/2017

## 2017-01-21 NOTE — SUBJECTIVE & OBJECTIVE
Interval History/Significant Events: Please see hospital course    Review of Systems   Constitutional: Positive for fatigue.   Respiratory: Negative for cough and shortness of breath.    Cardiovascular: Negative for chest pain.   Gastrointestinal: Positive for vomiting. Negative for abdominal pain and blood in stool.   Neurological: Positive for dizziness, weakness and light-headedness.     Objective:     Vital Signs (Most Recent):  Temp: 97.8 °F (36.6 °C) (01/21/17 0455)  Pulse: 91 (01/21/17 0530)  Resp: 16 (01/21/17 0530)  BP: (!) 154/75 (01/21/17 0530)  SpO2: 95 % (01/21/17 0530) Vital Signs (24h Range):  Temp:  [97.8 °F (36.6 °C)-98.4 °F (36.9 °C)] 97.8 °F (36.6 °C)  Pulse:  [] 91  Resp:  [16-18] 16  SpO2:  [92 %-98 %] 95 %  BP: (116-177)/(65-81) 154/75   Weight: 49.4 kg (109 lb)  Body mass index is 19.31 kg/(m^2).    No intake or output data in the 24 hours ending 01/21/17 0623    Physical Exam   Constitutional: She is oriented to person, place, and time. She appears ill.   HENT:   Head: Normocephalic and atraumatic.   Pt with dried blood around mouth    Eyes: Pupils are equal, round, and reactive to light. Scleral icterus is present.   Neck: Normal range of motion. Neck supple.   Cardiovascular: Regular rhythm.  Tachycardia present.    No murmur heard.  Pulmonary/Chest: Effort normal. No respiratory distress.   Abdominal: Soft. She exhibits no distension. There is no tenderness.   Musculoskeletal: She exhibits no edema.   Decreased strength in all 4 extremities   Neurological: She is alert and oriented to person, place, and time.   Skin:   Jaundiced skin       Vents:  Oxygen Concentration (%): 100 (01/17/17 1600)  Lines/Drains/Airways     Peripheral Intravenous Line                 Peripheral IV - Single Lumen 01/21/17 0521 Left Forearm less than 1 day         Peripheral IV - Single Lumen 01/21/17 0522 Right Forearm less than 1 day              Significant Labs:    CBC/Anemia Profile:    Recent Labs  Lab  01/21/17  0501   WBC 6.43  6.43   HGB 10.9*  10.9*  10.9*   HCT 32.0*  32.0*  32.0*   PLT 64*  64*   MCV 96  96   RDW 17.1*  17.1*        Chemistries:    Recent Labs  Lab 01/20/17  0504 01/21/17  0501    141  141   K 4.0 3.5  3.5   * 109  109   CO2 22* 16*  16*   BUN 5* 7  7   CREATININE 0.7 0.7  0.7   CALCIUM 8.9 9.2  9.2   ALBUMIN 3.1* 3.3*  3.3*   PROT 6.6 7.2  7.2   BILITOT 7.7* 7.5*  7.5*   ALKPHOS 172* 235*  235*   ALT 46* 51*  51*   AST 85* 94*  94*   MG 1.8 1.6       Coagulation:   Recent Labs  Lab 01/21/17  0501   INR 1.8*  1.8*       Significant Imaging:  I have reviewed all pertinent imaging results/findings within the past 24 hours.   US Liver with Doppler 1/15/17  -Small cirrhotic liver.    -Splenomegaly.  -Cholelithiasis.  Mild gallbladder wall thickening without evidence of hyperemia or pericholecystic fluid collection.  Sonographic Tang's sign is negative.  Gallbladder wall thickening may be secondary to nondistention or underlying liver disease.  Correlation with symptomatology for acute cholecystitis is recommended.    US Abdomen 1/15/17  -Several gallbladder stones measuring up to 1.1 cm with diffuse wall thickening.  Sonographic Tang's sign is negative, and no pericholecystic fluid or biliary ductal dilatation.  Findings are nonspecific but suggest a more chronic process such as secondary thickening from intrinsic liver disease or hypoproteinemia, with sequela of chronic cholecystitis not entirely excluded.  -Dilatation of the portal and splenic veins suggesting underlying elevated portal venous pressures.    CT Head 1/15/17  -No acute intracranial abnormalities.

## 2017-01-21 NOTE — ASSESSMENT & PLAN NOTE
- Cirrhosis secondary to alcohol abuse  - Review principal problem for more elaboration   - No fluid to be tapped in her ascitic fluid  - Consulted Hepatology and they recommend to continue lactulose and rifaximin - currently holding as pt NPO for scope today

## 2017-01-21 NOTE — ASSESSMENT & PLAN NOTE
-pt with 1 episode of hematemesis at approximately 0440 on 1/21/17 with 70cc of bright red blood suctioned  -concern for esophageal varices given pt's history of alcoholic cirrhosis; pt with no prior EGD as she refused it in the past per chart review. Only documentation of esophageal varices is in a discharge summary from 2015. US from 1/15/17 showed dilatation of the portal and splenic veins suggesting underlying elevated portal venous pressures.  -ordered CXray  -placed 2 large bore (18guage) peripheral IV   -pt started on octreotide and pantoprazole gtt  -Hb/Hct at 10.9/32.0 -- will consider trending q6h  -INR 1.8, will give Vitamin K IV daily for 3 days  -ordered type&screen, prepped 1U PRBC  -IV anti-emetics PRN  -consulted GI, will scope later today

## 2017-01-21 NOTE — PROGRESS NOTES
Spoke with Maggi, [facility], patient will be stepped down to Hospital Medicine.     Monalisa Gomez MD   IM PGY3

## 2017-01-21 NOTE — ASSESSMENT & PLAN NOTE
- Hospitalized for crystal and psychosis x 2; also has had depression (total length 4) (duration 3 wks and 1.5 weeks with the others)  - On lithium daily; psych recommended to hold Lithium for now as renal function declining and high risk for seizure in lieu of pts alcohol disorder and high home dose of Klonopin 2mg QID in addition to autonomic instability  -Will restart Klonopin 1mg BID with PRN coverage of Ativan 2mg po/im q4 for Benzo and alcohol withdrawals. May consider Risperdal 0.5 qhs if QTC<460. D/C PRN upon admission to ICU and pt NPO for scope today

## 2017-01-21 NOTE — MEDICAL/APP STUDENT
"1/21/2017 11:31 AM   Marely Hamilton   1965   [id number]        Psychiatry Progress Note     SUBJECTIVE:   Patient seen and examined this morning. Pt had an episode of hematemesis this morning. She was seen by GI and Critical Care teams and will likely have EGD today. Pt lying in bed with eyes closed upon entering. Pt was difficult to rouse. She would stare but would not respond to questions before closing her eyes and laying back down. She was unable to follow commands. At one point she woke up, pointed to her sock and her wrist bands and said, "Risk. Rich people low risk. Flying. Free. Arjun type shit." She then closed her eyes and put her head back down.       Current Medications:   Scheduled Meds:    clotrimazole  10 mg Oral TID    folic acid  1,000 mcg Oral Daily    phytonadione ((AQUA-MEPHYTON) IVPB  10 mg Intravenous Daily    thiamine  100 mg Oral Daily      PRN Meds: sodium chloride, dextrose 50%, dextrose 50%, glucagon (human recombinant), glucose, glucose, ondansetron, ramelteon   Psychotherapeutics     Start     Stop Route Frequency Ordered    01/15/17 2050  olanzapine injection 2.5 mg      01/15 2359 IM Once as needed 01/15/17 1950    01/18/17 1609  ramelteon tablet 8 mg      -- Oral Nightly PRN 01/18/17 1509          Allergies:   Review of patient's allergies indicates:   Allergen Reactions    Sulfa (sulfonamide antibiotics) Rash        OBJECTIVE:   Vitals   Vitals:    01/21/17 0830   BP: (!) 144/78   Pulse: 94   Resp: 15   Temp: 97.9 °F (36.6 °C)        Labs/Imaging/Studies:   Recent Results (from the past 36 hour(s))   Comprehensive Metabolic Panel (CMP)    Collection Time: 01/20/17  5:04 AM   Result Value Ref Range    Sodium 141 136 - 145 mmol/L    Potassium 4.0 3.5 - 5.1 mmol/L    Chloride 112 (H) 95 - 110 mmol/L    CO2 22 (L) 23 - 29 mmol/L    Glucose 118 (H) 70 - 110 mg/dL    BUN, Bld 5 (L) 6 - 20 mg/dL    Creatinine 0.7 0.5 - 1.4 mg/dL    Calcium 8.9 8.7 - 10.5 mg/dL    Total Protein 6.6 " 6.0 - 8.4 g/dL    Albumin 3.1 (L) 3.5 - 5.2 g/dL    Total Bilirubin 7.7 (H) 0.1 - 1.0 mg/dL    Alkaline Phosphatase 172 (H) 55 - 135 U/L    AST 85 (H) 10 - 40 U/L    ALT 46 (H) 10 - 44 U/L    Anion Gap 7 (L) 8 - 16 mmol/L    eGFR if African American >60.0 >60 mL/min/1.73 m^2    eGFR if non African American >60.0 >60 mL/min/1.73 m^2   PT/INR    Collection Time: 01/20/17  5:04 AM   Result Value Ref Range    Prothrombin Time 20.6 (H) 9.0 - 12.5 sec    INR 2.0 (H) 0.8 - 1.2   Magnesium    Collection Time: 01/20/17  5:04 AM   Result Value Ref Range    Magnesium 1.8 1.6 - 2.6 mg/dL   TSH    Collection Time: 01/20/17 12:02 PM   Result Value Ref Range    TSH 0.538 0.400 - 4.000 uIU/mL   POCT glucose    Collection Time: 01/21/17  4:40 AM   Result Value Ref Range    POCT Glucose 138 (H) 70 - 110 mg/dL   Comprehensive Metabolic Panel (CMP)    Collection Time: 01/21/17  5:01 AM   Result Value Ref Range    Sodium 141 136 - 145 mmol/L    Potassium 3.5 3.5 - 5.1 mmol/L    Chloride 109 95 - 110 mmol/L    CO2 16 (L) 23 - 29 mmol/L    Glucose 125 (H) 70 - 110 mg/dL    BUN, Bld 7 6 - 20 mg/dL    Creatinine 0.7 0.5 - 1.4 mg/dL    Calcium 9.2 8.7 - 10.5 mg/dL    Total Protein 7.2 6.0 - 8.4 g/dL    Albumin 3.3 (L) 3.5 - 5.2 g/dL    Total Bilirubin 7.5 (H) 0.1 - 1.0 mg/dL    Alkaline Phosphatase 235 (H) 55 - 135 U/L    AST 94 (H) 10 - 40 U/L    ALT 51 (H) 10 - 44 U/L    Anion Gap 16 8 - 16 mmol/L    eGFR if African American >60.0 >60 mL/min/1.73 m^2    eGFR if non African American >60.0 >60 mL/min/1.73 m^2   PT/INR    Collection Time: 01/21/17  5:01 AM   Result Value Ref Range    Prothrombin Time 18.5 (H) 9.0 - 12.5 sec    INR 1.8 (H) 0.8 - 1.2   Magnesium    Collection Time: 01/21/17  5:01 AM   Result Value Ref Range    Magnesium 1.6 1.6 - 2.6 mg/dL   CBC auto differential    Collection Time: 01/21/17  5:01 AM   Result Value Ref Range    WBC 6.43 3.90 - 12.70 K/uL    RBC 3.33 (L) 4.00 - 5.40 M/uL    Hemoglobin 10.9 (L) 12.0 - 16.0 g/dL     Hematocrit 32.0 (L) 37.0 - 48.5 %    MCV 96 82 - 98 fL    MCH 32.7 (H) 27.0 - 31.0 pg    MCHC 34.1 32.0 - 36.0 %    RDW 17.1 (H) 11.5 - 14.5 %    Platelets 64 (L) 150 - 350 K/uL    MPV 9.5 9.2 - 12.9 fL    Gran # 3.6 1.8 - 7.7 K/uL    Lymph # 1.9 1.0 - 4.8 K/uL    Mono # 0.6 0.3 - 1.0 K/uL    Eos # 0.3 0.0 - 0.5 K/uL    Baso # 0.03 0.00 - 0.20 K/uL    Gran% 56.0 38.0 - 73.0 %    Lymph% 29.5 18.0 - 48.0 %    Mono% 9.3 4.0 - 15.0 %    Eosinophil% 4.4 0.0 - 8.0 %    Basophil% 0.5 0.0 - 1.9 %    Differential Method Automated    Hemoglobin    Collection Time: 01/21/17  5:01 AM   Result Value Ref Range    Hemoglobin 10.9 (L) 12.0 - 16.0 g/dL   Hematocrit    Collection Time: 01/21/17  5:01 AM   Result Value Ref Range    Hematocrit 32.0 (L) 37.0 - 48.5 %   CBC auto differential    Collection Time: 01/21/17  5:01 AM   Result Value Ref Range    WBC 6.43 3.90 - 12.70 K/uL    RBC 3.33 (L) 4.00 - 5.40 M/uL    Hemoglobin 10.9 (L) 12.0 - 16.0 g/dL    Hematocrit 32.0 (L) 37.0 - 48.5 %    MCV 96 82 - 98 fL    MCH 32.7 (H) 27.0 - 31.0 pg    MCHC 34.1 32.0 - 36.0 %    RDW 17.1 (H) 11.5 - 14.5 %    Platelets 64 (L) 150 - 350 K/uL    MPV 9.5 9.2 - 12.9 fL    Gran # 3.6 1.8 - 7.7 K/uL    Lymph # 1.9 1.0 - 4.8 K/uL    Mono # 0.6 0.3 - 1.0 K/uL    Eos # 0.3 0.0 - 0.5 K/uL    Baso # 0.03 0.00 - 0.20 K/uL    Gran% 56.0 38.0 - 73.0 %    Lymph% 29.5 18.0 - 48.0 %    Mono% 9.3 4.0 - 15.0 %    Eosinophil% 4.4 0.0 - 8.0 %    Basophil% 0.5 0.0 - 1.9 %    Differential Method Automated    Comprehensive metabolic panel    Collection Time: 01/21/17  5:01 AM   Result Value Ref Range    Sodium 141 136 - 145 mmol/L    Potassium 3.5 3.5 - 5.1 mmol/L    Chloride 109 95 - 110 mmol/L    CO2 16 (L) 23 - 29 mmol/L    Glucose 125 (H) 70 - 110 mg/dL    BUN, Bld 7 6 - 20 mg/dL    Creatinine 0.7 0.5 - 1.4 mg/dL    Calcium 9.2 8.7 - 10.5 mg/dL    Total Protein 7.2 6.0 - 8.4 g/dL    Albumin 3.3 (L) 3.5 - 5.2 g/dL    Total Bilirubin 7.5 (H) 0.1 - 1.0 mg/dL     Alkaline Phosphatase 235 (H) 55 - 135 U/L    AST 94 (H) 10 - 40 U/L    ALT 51 (H) 10 - 44 U/L    Anion Gap 16 8 - 16 mmol/L    eGFR if African American >60.0 >60 mL/min/1.73 m^2    eGFR if non African American >60.0 >60 mL/min/1.73 m^2   Protime-INR    Collection Time: 01/21/17  5:01 AM   Result Value Ref Range    Prothrombin Time 18.5 (H) 9.0 - 12.5 sec    INR 1.8 (H) 0.8 - 1.2   Type & Screen    Collection Time: 01/21/17  5:16 AM   Result Value Ref Range    Group & Rh O POS     Indirect Ameya NEG    Prepare RBC 1 Unit    Collection Time: 01/21/17  5:16 AM   Result Value Ref Range    UNIT NUMBER [id number]     PRODUCT CODE M2544O43     DISPENSE STATUS CROSSMATCHED     CODING SYSTEM NCJQ645     Unit Blood Type Code 5100     Unit Blood Type O POS     Unit Expiration [id number]    CBC auto differential    Collection Time: 01/21/17  9:22 AM   Result Value Ref Range    WBC 4.70 3.90 - 12.70 K/uL    RBC 3.21 (L) 4.00 - 5.40 M/uL    Hemoglobin 10.4 (L) 12.0 - 16.0 g/dL    Hematocrit 30.8 (L) 37.0 - 48.5 %    MCV 96 82 - 98 fL    MCH 32.4 (H) 27.0 - 31.0 pg    MCHC 33.8 32.0 - 36.0 %    RDW 17.2 (H) 11.5 - 14.5 %    Platelets 55 (L) 150 - 350 K/uL    MPV 10.0 9.2 - 12.9 fL    Gran # 3.3 1.8 - 7.7 K/uL    Lymph # 0.9 (L) 1.0 - 4.8 K/uL    Mono # 0.4 0.3 - 1.0 K/uL    Eos # 0.1 0.0 - 0.5 K/uL    Baso # 0.01 0.00 - 0.20 K/uL    Gran% 69.9 38.0 - 73.0 %    Lymph% 19.1 18.0 - 48.0 %    Mono% 8.5 4.0 - 15.0 %    Eosinophil% 2.1 0.0 - 8.0 %    Basophil% 0.2 0.0 - 1.9 %    Differential Method Automated    Protime-INR    Collection Time: 01/21/17  9:22 AM   Result Value Ref Range    Prothrombin Time 18.7 (H) 9.0 - 12.5 sec    INR 1.9 (H) 0.8 - 1.2        Mental Status Exam:   Arousal: difficult to arouse, drifting in and out of sleep  Appearance: lying in bed, disheveled, dried blood in and around mouth  Sensorium/Orientation: not oriented to person, place, or time  Grooming: poor  Behavior/Cooperation: unable to cooperate    Psychomotor: PMR  Speech: short, broken   Language: English  Mood: unable to assess   Affect: flat    Thought Process: illogical   Thought Content: unable to assess  Associations: unable to assess  Attention/Concentration: unable to concentrate  Memory: unable to assess  Fund of Knowledge: unable to assess  Insight: unable to assess   Judgment: impaired    ASSESSMENT/PLAN:     52yo female w/ Hx of bipolar disorder and alcohol use disorder. She is currently admitted for decompensated alcoholic cirrhosis and hepatic encephalopathy. Likely suffering from withdrawal from alcohol and benzodiazepines.     Recommendations:    - Management per primary team  - d/c klonopin due to declining liver function  - Start Ativan 1mg po BID  - Continue Ativan 2mg po every 4 hours prn for benzo withdrawl  - Multivitamin, Thiamine, and Folate qd    Rangel Maya, L3  [employer]  [facility]

## 2017-01-21 NOTE — SIGNIFICANT EVENT
Responded to patient found with blood around mouth and actively vomiting blood. Patient has history of cirrhosis secondary to alcohol abuse. Patient was not responsive when I arrived at the room. Vitals were ascertained and found to be within normal limits. CBC, CMP, type and screen ordered. Octreotide and protonix bolus and infusion started. ICU consulted and presented to bedside. GI consulted, indicated likely EGD this AM. Patient continued to remain stable hemodynamically. Will continue to monitor closely.    Cliff Cee MD PGY1    Sister and Mother called x2 for collateral and to inform of change in condition at approximately 0530.

## 2017-01-21 NOTE — CONSULTS
Pt to be admitted to ICU due to concern for esophageal bleed given pt's history of alcoholic cirrhosis. Full H&P dated 1/221/17 to follow.    Discussed with fellow, Dr. Marquez.     Kevin Rolon MD

## 2017-01-21 NOTE — PROGRESS NOTES
"Hepatology  Progress note      SUBJECTIVE:     Reason for Consult: ESLD    Initial HPI:  Patient is a 51 y.o. 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    No family presently at bedside, however the mother had reported that she had noticed a progressive decline in mental function over the past week.  She was unsure if the patient was taking her lactulose as prescribed.  No reported ETOH use, however the mother was unsure of this.  No known fevers, chills, rigors.      The patient was previously worked up for liver transplant at [facility] and was in workup for OLT here at [facility] last Spring, however she stopped her evaluation because she said her [facility] physician told her she "didn't need a transplant."  No further follow up since March, 2016.    Interval Hx: Patient had a seizure overnight -- lost bowel and bladder continence, likely with tongue biting causing oral bleeding.      OBJECTIVE:     Vital Signs (Most Recent)  Temp: 97.9 °F (36.6 °C) (01/21/17 0830)  Pulse: 94 (01/21/17 0830)  Resp: 15 (01/21/17 0830)  BP: (!) 144/78 (01/21/17 0830)  SpO2: 98 % (01/21/17 0830)    Physical Exam:  General appearance: no acute distress;   Eyes: scleral icterus  Lungs: breath sounds vesicular in nature, air entry equal bilaterally,   Heart: regular rate and rhythm, S1, S2 clearly audible,  Abdomen: soft, non-tender, non-distended;  no rebound tenderness, rigidity, or guarding  Extremities: clubbing bilaterally  Pulses: 2+ and symmetric  Skin: Skin color, texture, turgor normal.   Neurologic: no focal deficits noted    Laboratory    CBC:     Recent Labs  Lab 01/19/17  0420 01/21/17  0501 01/21/17  0922   WBC 6.74 6.43  6.43 4.70   RBC 3.37* 3.33*  3.33* 3.21*   HGB 10.9* 10.9*  10.9*  10.9* 10.4*   HCT 31.3* 32.0*  32.0*  32.0* 30.8*   PLT 57* 64*  64* 55*   MCV 93 96  96 96   MCH 32.3* 32.7*  32.7* 32.4*   MCHC 34.8 34.1  " "34.1 33.8     BMP:     Recent Labs  Lab 01/19/17  0420 01/20/17  0504 01/21/17  0501   * 118* 125*  125*    141 141  141   K 3.4* 4.0 3.5  3.5    112* 109  109   CO2 20* 22* 16*  16*   BUN 5* 5* 7  7   CREATININE 0.7 0.7 0.7  0.7   CALCIUM 9.3 8.9 9.2  9.2   MG 1.5* 1.8 1.6     Coagulation:     Recent Labs  Lab 01/21/17  0922   INR 1.9*           ASSESSMENT/PLAN:     ESLD 2/2 ETOH presenting with hepatic encephalopathy likely 2/2 medication noncompliance.  Patient has no obvious ascites for drainage by U/S, her UA was not consistent with infection.  She has no white count and no fevers.      PETH negative    1/21: ?Benzo withdrawal seizure    Recommendations:     Appreciate Psych recs: lithium discontinued 2/2 "declining renal function" however renal function stable and WNL -- ?further recs    Appreciate GI recs: likely oral bleeding sustained during seizure.  No convincing evidence of Upper GI bleed    Continue lactulose with dose titrated to have 3-4 liquid BM's per day  Continue Rifaximin 500mg BID  Zinc Sulfate  Check daily CMP    Will revisit the idea of potential liver transplant evaluation with patient, patient's sister, and mother over the weekend -- sister is planning on visiting from [place]   Gastroenterology Fellow PGY V  [phone]    "

## 2017-01-22 PROBLEM — R56.9 SEIZURE IN RESPONSE TO ACUTE EVENT: Status: ACTIVE | Noted: 2017-01-22

## 2017-01-22 PROBLEM — F13.939 BENZODIAZEPINE WITHDRAWAL WITH COMPLICATION: Status: ACTIVE | Noted: 2017-01-22

## 2017-01-22 PROBLEM — S01.512A TONGUE LACERATION: Status: ACTIVE | Noted: 2017-01-22

## 2017-01-22 PROBLEM — K92.0 HEMATEMESIS: Status: RESOLVED | Noted: 2017-01-21 | Resolved: 2017-01-22

## 2017-01-22 PROBLEM — K92.2 UGIB (UPPER GASTROINTESTINAL BLEED): Status: RESOLVED | Noted: 2017-01-21 | Resolved: 2017-01-22

## 2017-01-22 LAB
ALBUMIN SERPL BCP-MCNC: 2.9 G/DL
ALLENS TEST: ABNORMAL
ALP SERPL-CCNC: 138 U/L
ALT SERPL W/O P-5'-P-CCNC: 40 U/L
ANION GAP SERPL CALC-SCNC: 7 MMOL/L
AST SERPL-CCNC: 72 U/L
BASOPHILS # BLD AUTO: 0.03 K/UL
BASOPHILS # BLD AUTO: 0.03 K/UL
BASOPHILS # BLD AUTO: 0.05 K/UL
BASOPHILS NFR BLD: 0.4 %
BILIRUB SERPL-MCNC: 10 MG/DL
BLD PROD TYP BPU: NORMAL
BLOOD UNIT EXPIRATION DATE: NORMAL
BLOOD UNIT TYPE CODE: 5100
BLOOD UNIT TYPE: NORMAL
BUN SERPL-MCNC: 7 MG/DL
CALCIUM SERPL-MCNC: 8.4 MG/DL
CHLORIDE SERPL-SCNC: 106 MMOL/L
CO2 SERPL-SCNC: 23 MMOL/L
CODING SYSTEM: NORMAL
CREAT SERPL-MCNC: 0.6 MG/DL
DELSYS: ABNORMAL
DIFFERENTIAL METHOD: ABNORMAL
DISPENSE STATUS: NORMAL
EOSINOPHIL # BLD AUTO: 0.1 K/UL
EOSINOPHIL # BLD AUTO: 0.2 K/UL
EOSINOPHIL # BLD AUTO: 0.2 K/UL
EOSINOPHIL NFR BLD: 1.4 %
EOSINOPHIL NFR BLD: 1.5 %
EOSINOPHIL NFR BLD: 2.4 %
ERYTHROCYTE [DISTWIDTH] IN BLOOD BY AUTOMATED COUNT: 16.7 %
ERYTHROCYTE [DISTWIDTH] IN BLOOD BY AUTOMATED COUNT: 16.8 %
ERYTHROCYTE [DISTWIDTH] IN BLOOD BY AUTOMATED COUNT: 16.9 %
EST. GFR  (AFRICAN AMERICAN): >60 ML/MIN/1.73 M^2
EST. GFR  (NON AFRICAN AMERICAN): >60 ML/MIN/1.73 M^2
FLOW: 4
GLUCOSE SERPL-MCNC: 121 MG/DL
HCO3 UR-SCNC: 15.2 MMOL/L (ref 24–28)
HCT VFR BLD AUTO: 28.5 %
HCT VFR BLD AUTO: 29 %
HCT VFR BLD AUTO: 30.4 %
HGB BLD-MCNC: 10.5 G/DL
HGB BLD-MCNC: 9.9 G/DL
HGB BLD-MCNC: 9.9 G/DL
INR PPP: 1.7
LYMPHOCYTES # BLD AUTO: 1.1 K/UL
LYMPHOCYTES # BLD AUTO: 1.7 K/UL
LYMPHOCYTES # BLD AUTO: 2.3 K/UL
LYMPHOCYTES NFR BLD: 15.4 %
LYMPHOCYTES NFR BLD: 18.9 %
LYMPHOCYTES NFR BLD: 21.1 %
MAGNESIUM SERPL-MCNC: 1.3 MG/DL
MCH RBC QN AUTO: 32.5 PG
MCH RBC QN AUTO: 32.9 PG
MCH RBC QN AUTO: 33.2 PG
MCHC RBC AUTO-ENTMCNC: 34.1 %
MCHC RBC AUTO-ENTMCNC: 34.5 %
MCHC RBC AUTO-ENTMCNC: 34.7 %
MCV RBC AUTO: 93 FL
MCV RBC AUTO: 96 FL
MCV RBC AUTO: 96 FL
MODE: ABNORMAL
MONOCYTES # BLD AUTO: 0.7 K/UL
MONOCYTES # BLD AUTO: 0.7 K/UL
MONOCYTES # BLD AUTO: 1.1 K/UL
MONOCYTES NFR BLD: 8.6 %
MONOCYTES NFR BLD: 9.4 %
MONOCYTES NFR BLD: 9.5 %
NEUTROPHILS # BLD AUTO: 5.2 K/UL
NEUTROPHILS # BLD AUTO: 5.4 K/UL
NEUTROPHILS # BLD AUTO: 8.4 K/UL
NEUTROPHILS NFR BLD: 67.1 %
NEUTROPHILS NFR BLD: 69.4 %
NEUTROPHILS NFR BLD: 72.7 %
NUM UNITS TRANS FFP: NORMAL
PCO2 BLDA: 38.2 MMHG (ref 35–45)
PH SMN: 7.21 [PH] (ref 7.35–7.45)
PLATELET # BLD AUTO: 55 K/UL
PLATELET # BLD AUTO: 58 K/UL
PLATELET # BLD AUTO: 71 K/UL
PMV BLD AUTO: 9.7 FL
PMV BLD AUTO: 9.7 FL
PMV BLD AUTO: 9.8 FL
PO2 BLDA: 94 MMHG (ref 80–100)
POC BE: -13 MMOL/L
POC SATURATED O2: 95 % (ref 95–100)
POC TCO2: 16 MMOL/L (ref 23–27)
POCT GLUCOSE: 164 MG/DL (ref 70–110)
POTASSIUM SERPL-SCNC: 4.1 MMOL/L
PROT SERPL-MCNC: 6.3 G/DL
PROTHROMBIN TIME: 17.1 SEC
RBC # BLD AUTO: 3.01 M/UL
RBC # BLD AUTO: 3.05 M/UL
RBC # BLD AUTO: 3.16 M/UL
SAMPLE: ABNORMAL
SITE: ABNORMAL
SODIUM SERPL-SCNC: 136 MMOL/L
SP02: 95
WBC # BLD AUTO: 12.09 K/UL
WBC # BLD AUTO: 7.14 K/UL
WBC # BLD AUTO: 8.02 K/UL

## 2017-01-22 PROCEDURE — 83735 ASSAY OF MAGNESIUM: CPT

## 2017-01-22 PROCEDURE — 99232 SBSQ HOSP IP/OBS MODERATE 35: CPT | Mod: GC,,, | Performed by: INTERNAL MEDICINE

## 2017-01-22 PROCEDURE — 36415 COLL VENOUS BLD VENIPUNCTURE: CPT

## 2017-01-22 PROCEDURE — 63600175 PHARM REV CODE 636 W HCPCS: Performed by: STUDENT IN AN ORGANIZED HEALTH CARE EDUCATION/TRAINING PROGRAM

## 2017-01-22 PROCEDURE — 82803 BLOOD GASES ANY COMBINATION: CPT

## 2017-01-22 PROCEDURE — 63600175 PHARM REV CODE 636 W HCPCS: Performed by: INTERNAL MEDICINE

## 2017-01-22 PROCEDURE — 85610 PROTHROMBIN TIME: CPT

## 2017-01-22 PROCEDURE — 99223 1ST HOSP IP/OBS HIGH 75: CPT | Mod: GC,,, | Performed by: INTERNAL MEDICINE

## 2017-01-22 PROCEDURE — 11000001 HC ACUTE MED/SURG PRIVATE ROOM

## 2017-01-22 PROCEDURE — 36600 WITHDRAWAL OF ARTERIAL BLOOD: CPT

## 2017-01-22 PROCEDURE — 99233 SBSQ HOSP IP/OBS HIGH 50: CPT | Mod: GC,,, | Performed by: HOSPITALIST

## 2017-01-22 PROCEDURE — 25000003 PHARM REV CODE 250: Performed by: STUDENT IN AN ORGANIZED HEALTH CARE EDUCATION/TRAINING PROGRAM

## 2017-01-22 PROCEDURE — P9017 PLASMA 1 DONOR FRZ W/IN 8 HR: HCPCS | Mod: BL

## 2017-01-22 PROCEDURE — P9017 PLASMA 1 DONOR FRZ W/IN 8 HR: HCPCS

## 2017-01-22 PROCEDURE — 36430 TRANSFUSION BLD/BLD COMPNT: CPT

## 2017-01-22 PROCEDURE — 80053 COMPREHEN METABOLIC PANEL: CPT

## 2017-01-22 PROCEDURE — 85025 COMPLETE CBC W/AUTO DIFF WBC: CPT | Mod: 91

## 2017-01-22 PROCEDURE — 63600175 PHARM REV CODE 636 W HCPCS

## 2017-01-22 PROCEDURE — 25000003 PHARM REV CODE 250: Performed by: INTERNAL MEDICINE

## 2017-01-22 RX ORDER — DOXYLAMINE SUCCINATE 25 MG
TABLET ORAL 2 TIMES DAILY
Status: DISCONTINUED | OUTPATIENT
Start: 2017-01-22 | End: 2017-01-26 | Stop reason: HOSPADM

## 2017-01-22 RX ORDER — LORAZEPAM 1 MG/1
2 TABLET ORAL EVERY 4 HOURS PRN
Status: DISCONTINUED | OUTPATIENT
Start: 2017-01-22 | End: 2017-01-22

## 2017-01-22 RX ORDER — LORAZEPAM 1 MG/1
1 TABLET ORAL 2 TIMES DAILY
Status: DISCONTINUED | OUTPATIENT
Start: 2017-01-22 | End: 2017-01-22

## 2017-01-22 RX ORDER — LORAZEPAM 2 MG/ML
2 INJECTION INTRAMUSCULAR ONCE
Status: COMPLETED | OUTPATIENT
Start: 2017-01-22 | End: 2017-01-22

## 2017-01-22 RX ORDER — LORAZEPAM 1 MG/1
1 TABLET ORAL EVERY 6 HOURS
Status: DISCONTINUED | OUTPATIENT
Start: 2017-01-22 | End: 2017-01-22

## 2017-01-22 RX ORDER — LORAZEPAM 2 MG/ML
INJECTION INTRAMUSCULAR
Status: COMPLETED
Start: 2017-01-22 | End: 2017-01-22

## 2017-01-22 RX ORDER — LORAZEPAM 2 MG/ML
2 INJECTION INTRAMUSCULAR EVERY 10 MIN PRN
Status: DISCONTINUED | OUTPATIENT
Start: 2017-01-22 | End: 2017-01-26 | Stop reason: HOSPADM

## 2017-01-22 RX ORDER — LORAZEPAM 2 MG/ML
1 INJECTION INTRAMUSCULAR EVERY 6 HOURS
Status: DISCONTINUED | OUTPATIENT
Start: 2017-01-22 | End: 2017-01-23

## 2017-01-22 RX ORDER — HYDROCODONE BITARTRATE AND ACETAMINOPHEN 500; 5 MG/1; MG/1
TABLET ORAL
Status: DISCONTINUED | OUTPATIENT
Start: 2017-01-22 | End: 2017-01-23

## 2017-01-22 RX ORDER — LACTULOSE 10 G/15ML
15 SOLUTION ORAL 3 TIMES DAILY
Status: DISCONTINUED | OUTPATIENT
Start: 2017-01-22 | End: 2017-01-26 | Stop reason: HOSPADM

## 2017-01-22 RX ADMIN — PHYTONADIONE 10 MG: 10 INJECTION, EMULSION INTRAMUSCULAR; INTRAVENOUS; SUBCUTANEOUS at 09:01

## 2017-01-22 RX ADMIN — Medication 8 MG/HR: at 09:01

## 2017-01-22 RX ADMIN — RIFAXIMIN 550 MG: 550 TABLET ORAL at 08:01

## 2017-01-22 RX ADMIN — LORAZEPAM 1 MG: 2 INJECTION INTRAMUSCULAR; INTRAVENOUS at 05:01

## 2017-01-22 RX ADMIN — CLOTRIMAZOLE 10 MG: 10 LOZENGE ORAL at 09:01

## 2017-01-22 RX ADMIN — CLOTRIMAZOLE 10 MG: 10 LOZENGE ORAL at 05:01

## 2017-01-22 RX ADMIN — MICONAZOLE NITRATE: 20 CREAM TOPICAL at 01:01

## 2017-01-22 RX ADMIN — LORAZEPAM 2 MG: 2 INJECTION INTRAMUSCULAR at 11:01

## 2017-01-22 RX ADMIN — Medication 8 MG/HR: at 05:01

## 2017-01-22 RX ADMIN — CLOTRIMAZOLE 10 MG: 10 LOZENGE ORAL at 02:01

## 2017-01-22 RX ADMIN — RAMELTEON 8 MG: 8 TABLET, FILM COATED ORAL at 08:01

## 2017-01-22 RX ADMIN — FOLIC ACID 1000 MCG: 1 TABLET ORAL at 09:01

## 2017-01-22 RX ADMIN — LORAZEPAM 2 MG: 2 INJECTION INTRAMUSCULAR; INTRAVENOUS at 11:01

## 2017-01-22 RX ADMIN — LACTULOSE 15 G: 10 SOLUTION ORAL at 09:01

## 2017-01-22 RX ADMIN — MICONAZOLE NITRATE: 20 CREAM TOPICAL at 08:01

## 2017-01-22 RX ADMIN — OCTREOTIDE ACETATE 50 MCG/HR: 50 INJECTION, SOLUTION INTRAVENOUS; SUBCUTANEOUS at 05:01

## 2017-01-22 RX ADMIN — Medication 100 MG: at 09:01

## 2017-01-22 RX ADMIN — LACTULOSE 15 G: 10 SOLUTION ORAL at 02:01

## 2017-01-22 NOTE — ASSESSMENT & PLAN NOTE
- Could be secondary to her chronic liver disease and hypersplenism  - off of dvt ppx as the patient has a oral pharyngeal bleed.

## 2017-01-22 NOTE — ASSESSMENT & PLAN NOTE
- Non compliance history of her lactulose.   - Ammonia at presentation was 50   - complicated by her administration of Klonopin.   - Delirium precaution    Daytime: awake, up in chair as tolerated, tv on, window shades up, frequent reorientation.   Nighttime: in bed, minimize interruptions, tv off, window shades down.  - lactulose and rifxamin has improved her mental status.   - Hepatitis panel and HIV negative  - PETH negative   - Hepatology will decide for transplant work up with patient and her sister and they tried to reach her sister and communicate regarding liver transplant workup. If failed to reach sister will communicate mother. If no transplant workup will be initiated, palliative consult is imperative.   - Started on long term vitamin treatment.  - NPO past midnight.    MELD-Na score: 22 at 1/22/2017  6:22 AM  MELD score: 21 at 1/22/2017  6:22 AM  Calculated from:  Serum Creatinine: 0.6 mg/dL (Rounded to 1) at 1/22/2017  6:22 AM  Serum Sodium: 136 mmol/L at 1/22/2017  6:22 AM  Total Bilirubin: 10.0 mg/dL at 1/22/2017  6:22 AM  INR(ratio): 1.7 at 1/22/2017  6:22 AM  Age: 51 years

## 2017-01-22 NOTE — SUBJECTIVE & OBJECTIVE
Interval History:   No acute issues overnight. Currently however, patient seized before administration of ativan. Currently in post-ictal state. Before this was able to converse and was alert and oriented.      Review of Systems   Constitutional: Positive for fatigue. Negative for activity change, appetite change, chills, diaphoresis and fever.   HENT: Negative for congestion and dental problem.    Eyes: Negative for photophobia, pain, discharge and redness.   Respiratory: Negative for apnea, cough, choking and shortness of breath.    Cardiovascular: Negative for chest pain, palpitations and leg swelling.   Gastrointestinal: Negative for abdominal distention, abdominal pain, anal bleeding and blood in stool.   Genitourinary: Negative for difficulty urinating, dysuria, flank pain and frequency.   Musculoskeletal: Negative for back pain and gait problem.   Skin: Positive for color change and pallor.   Neurological: Positive for seizures. Negative for dizziness, facial asymmetry and headaches.   Hematological: Does not bruise/bleed easily.     Objective:     Vital Signs (Most Recent):  Temp: 98.1 °F (36.7 °C) (01/22/17 1133)  Pulse: 97 (01/22/17 1133)  Resp: 18 (01/22/17 1133)  BP: (!) 163/77 (01/22/17 1133)  SpO2: 100 % (01/22/17 1133) Vital Signs (24h Range):  Temp:  [97.1 °F (36.2 °C)-100.1 °F (37.8 °C)] 98.1 °F (36.7 °C)  Pulse:  [] 97  Resp:  [16-20] 18  SpO2:  [93 %-100 %] 100 %  BP: (122-202)/(58-82) 163/77     Weight: 49.4 kg (109 lb)  Body mass index is 19.31 kg/(m^2).  No intake or output data in the 24 hours ending 01/22/17 1147   Physical Exam   Constitutional: She appears well-developed. No distress.   HENT:   Sclerae of face and conjunctivae    Eyes: Right eye exhibits no discharge. Left eye exhibits no discharge. Scleral icterus is present.   Neck: Normal range of motion. Neck supple. No JVD present. No thyromegaly present.   Cardiovascular: Normal rate and regular rhythm.  Exam reveals no friction  rub.    No murmur heard.  Pulmonary/Chest: Effort normal and breath sounds normal. No respiratory distress. She has no wheezes. She has no rales.   Abdominal: Soft. Bowel sounds are normal. She exhibits no distension. There is no tenderness.   Negative for hepatic asterixis.    Neurological: No cranial nerve deficit. Coordination normal.   conscious but only oriented to person and place.    Skin: She is not diaphoretic.   Vitals reviewed.        Recent Labs  Lab 01/20/17  0504 01/21/17  0501 01/22/17  0622    141  141 136   K 4.0 3.5  3.5 4.1   * 109  109 106   CO2 22* 16*  16* 23   BUN 5* 7  7 7   CREATININE 0.7 0.7  0.7 0.6   CALCIUM 8.9 9.2  9.2 8.4*   MG 1.8 1.6 1.3*        Recent Labs  Lab 01/20/17  0504 01/21/17  0501 01/22/17  0622   ALBUMIN 3.1* 3.3*  3.3* 2.9*   PROT 6.6 7.2  7.2 6.3   BILITOT 7.7* 7.5*  7.5* 10.0*   ALKPHOS 172* 235*  235* 138*   ALT 46* 51*  51* 40   AST 85* 94*  94* 72*

## 2017-01-22 NOTE — PROGRESS NOTES
"Hepatology  Progress note      SUBJECTIVE:     Reason for Consult: ESLD    Initial HPI:  Patient is a 51 y.o. 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    No family presently at bedside, however the mother had reported that she had noticed a progressive decline in mental function over the past week.  She was unsure if the patient was taking her lactulose as prescribed.  No reported ETOH use, however the mother was unsure of this.  No known fevers, chills, rigors.      The patient was previously worked up for liver transplant at [facility] and was in workup for OLT here at [facility] last Spring, however she stopped her evaluation because she said her [facility] physician told her she "didn't need a transplant."  No further follow up since March, 2016.    Interval Hx: Patient had a seizure overnight -- lost bowel and bladder continence, likely with tongue biting causing oral bleeding.      OBJECTIVE:     Vital Signs (Most Recent)  Temp: 98.1 °F (36.7 °C) (01/22/17 1133)  Pulse: 97 (01/22/17 1133)  Resp: 18 (01/22/17 1133)  BP: (!) 163/77 (01/22/17 1133)  SpO2: 100 % (01/22/17 1133)    Physical Exam:  General appearance: no acute distress;   Eyes: scleral icterus  Lungs: breath sounds vesicular in nature, air entry equal bilaterally,   Heart: regular rate and rhythm, S1, S2 clearly audible,  Abdomen: soft, non-tender, non-distended;  no rebound tenderness, rigidity, or guarding  Extremities: clubbing bilaterally  Pulses: 2+ and symmetric  Skin: Skin color, texture, turgor normal.   Neurologic: no focal deficits noted    Laboratory    CBC:     Recent Labs  Lab 01/21/17  1554 01/21/17  2346 01/22/17  0800   WBC 7.48 12.09 8.02   RBC 3.04* 3.16* 3.01*   HGB 9.9* 10.5* 9.9*   HCT 28.7* 30.4* 29.0*   PLT 53* 71* 58*   MCV 94 96 96   MCH 32.6* 33.2* 32.9*   MCHC 34.5 34.5 34.1     BMP:     Recent Labs  Lab 01/20/17  0504 01/21/17  0501 " 01/22/17  0622   * 125*  125* 121*    141  141 136   K 4.0 3.5  3.5 4.1   * 109  109 106   CO2 22* 16*  16* 23   BUN 5* 7  7 7   CREATININE 0.7 0.7  0.7 0.6   CALCIUM 8.9 9.2  9.2 8.4*   MG 1.8 1.6 1.3*     Coagulation:     Recent Labs  Lab 01/22/17  0622   INR 1.7*           ASSESSMENT/PLAN:     ESLD 2/2 ETOH presenting with hepatic encephalopathy likely 2/2 medication noncompliance.  Patient has no obvious ascites for drainage by U/S, her UA was not consistent with infection.  She has no white count and no fevers.      PETH negative    1/21: ?Benzo withdrawal seizure    Recommendations:     Appreciate Psych recs: patient switched to Ativan scheduled+PRN    Appreciate GI recs: likely oral bleeding sustained during seizure.  No convincing evidence of Upper GI bleed    Continue lactulose with dose titrated to have 3-4 liquid BM's per day  Continue Rifaximin 500mg BID  Zinc Sulfate  Check daily CMP    Will revisit the idea of potential liver transplant evaluation once patient has had a chance to speak with her sister.     Gagandeep Nunez   Gastroenterology Fellow PGY V  [phone]

## 2017-01-22 NOTE — PROGRESS NOTES
"[facility] Hospital Medicine  Progress Note    Patient Name: Marely Hamilton  MRN: [id number]  Patient Class: IP- Inpatient   Admission Date: 1/15/2017  Length of Stay: 7 days  Attending Physician: Blessing Cherry MD  Primary Care Provider: Viktor Ross MD    [facility] Medicine Team: [facility] HOSP MED 5 Julia Collazo MD    Subjective:     Principal Problem:Hepatic encephalopathy    HPI:  Marely Hamilton, 51 year female known to have Liver Cirrhosis and ESLD secondary to alcohol abuse, Bipolar disorder she was brought to ED by her mother after she noticed increasing confusion and skin discoloration of several days duration prior to admission.    When hospital medicine evaluated the patient at bedside, she was by herself and not accompanied by anyone, patient was not good historian and she couldn't reply the questions they were asking. Primary team called the mother Joy Oconnor [phone] and asked questions about her current presentation. So based on mothers' conversation over the phone she claimed that she was non complaint with her medication especially Lactulose and she did not keep track of her bowel movement on daily basis. Mother also mentioned that she was having some change in her basal mentation and when you ask her question and doesn't answer question and taking about her sister and cats and "doesn't make sense". Patient lives with her mother. Her mother noticed change in her face color and eyes color in the last couple of weeks more noticeably in the last couple of days. Mother denies if she observed any fever chills, or symptoms systemic infections and lethargy. Not clear when she drinks the last alcohol drink.            Hospital Course:  Patient admitted to hospital medicine and treated for hepatic encephalopathy - per notes, pt improved on PO lactulose and evaluated by liver transplant and psychiatry. ICU consulted on 1/21/17 after pt had 1 episode of what was thought to be " hematemesis but later upon inspection was a bleed from mouth trauma secondary to a seizure.     01/22/17 - Patient was scheduled to get ativan, but seized before administration of ativan this morning. The patient was treated with ativan 2 mg IV once and started on 1 mg q6 hours. 1 unit of FFP ordered for her oral pharyngeal bleed. Mouth packed. The patient is currently being considered for hepatic transplantation. If she does not elect for this then palliation should be considered.        Interval History:   No acute issues overnight. Currently however, patient seized before administration of ativan. Currently in post-ictal state. Before this was able to converse and was alert and oriented.      Review of Systems   Constitutional: Positive for fatigue. Negative for activity change, appetite change, chills, diaphoresis and fever.   HENT: Negative for congestion and dental problem.    Eyes: Negative for photophobia, pain, discharge and redness.   Respiratory: Negative for apnea, cough, choking and shortness of breath.    Cardiovascular: Negative for chest pain, palpitations and leg swelling.   Gastrointestinal: Negative for abdominal distention, abdominal pain, anal bleeding and blood in stool.   Genitourinary: Negative for difficulty urinating, dysuria, flank pain and frequency.   Musculoskeletal: Negative for back pain and gait problem.   Skin: Positive for color change and pallor.   Neurological: Positive for seizures. Negative for dizziness, facial asymmetry and headaches.   Hematological: Does not bruise/bleed easily.     Objective:     Vital Signs (Most Recent):  Temp: 98.1 °F (36.7 °C) (01/22/17 1133)  Pulse: 97 (01/22/17 1133)  Resp: 18 (01/22/17 1133)  BP: (!) 163/77 (01/22/17 1133)  SpO2: 100 % (01/22/17 1133) Vital Signs (24h Range):  Temp:  [97.1 °F (36.2 °C)-100.1 °F (37.8 °C)] 98.1 °F (36.7 °C)  Pulse:  [] 97  Resp:  [16-20] 18  SpO2:  [93 %-100 %] 100 %  BP: (122-202)/(58-82) 163/77     Weight:  49.4 kg (109 lb)  Body mass index is 19.31 kg/(m^2).  No intake or output data in the 24 hours ending 01/22/17 1147   Physical Exam   Constitutional: She appears well-developed. No distress.   HENT:   Sclerae of face and conjunctivae    Eyes: Right eye exhibits no discharge. Left eye exhibits no discharge. Scleral icterus is present.   Neck: Normal range of motion. Neck supple. No JVD present. No thyromegaly present.   Cardiovascular: Normal rate and regular rhythm.  Exam reveals no friction rub.    No murmur heard.  Pulmonary/Chest: Effort normal and breath sounds normal. No respiratory distress. She has no wheezes. She has no rales.   Abdominal: Soft. Bowel sounds are normal. She exhibits no distension. There is no tenderness.   Negative for hepatic asterixis.    Neurological: No cranial nerve deficit. Coordination normal.   conscious but only oriented to person and place.    Skin: She is not diaphoretic.   Vitals reviewed.        Recent Labs  Lab 01/20/17  0504 01/21/17  0501 01/22/17  0622    141  141 136   K 4.0 3.5  3.5 4.1   * 109  109 106   CO2 22* 16*  16* 23   BUN 5* 7  7 7   CREATININE 0.7 0.7  0.7 0.6   CALCIUM 8.9 9.2  9.2 8.4*   MG 1.8 1.6 1.3*        Recent Labs  Lab 01/20/17  0504 01/21/17  0501 01/22/17  0622   ALBUMIN 3.1* 3.3*  3.3* 2.9*   PROT 6.6 7.2  7.2 6.3   BILITOT 7.7* 7.5*  7.5* 10.0*   ALKPHOS 172* 235*  235* 138*   ALT 46* 51*  51* 40   AST 85* 94*  94* 72*         Assessment/Plan:      * Hepatic encephalopathy  - Non compliance history of her lactulose.   - Ammonia at presentation was 50   - complicated by her administration of Klonopin.   - Delirium precaution    Daytime: awake, up in chair as tolerated, tv on, window shades up, frequent reorientation.   Nighttime: in bed, minimize interruptions, tv off, window shades down.  - lactulose and rifxamin has improved her mental status.   - Hepatitis panel and HIV negative  - PETH negative   - Hepatology will decide  for transplant work up with patient and her sister and they tried to reach her sister and communicate regarding liver transplant workup. If failed to reach sister will communicate mother. If no transplant workup will be initiated, palliative consult is imperative.   - Started on long term vitamin treatment.  - NPO past midnight.    MELD-Na score: 22 at 1/22/2017  6:22 AM  MELD score: 21 at 1/22/2017  6:22 AM  Calculated from:  Serum Creatinine: 0.6 mg/dL (Rounded to 1) at 1/22/2017  6:22 AM  Serum Sodium: 136 mmol/L at 1/22/2017  6:22 AM  Total Bilirubin: 10.0 mg/dL at 1/22/2017  6:22 AM  INR(ratio): 1.7 at 1/22/2017  6:22 AM  Age: 51 years      Cirrhosis, Laennec's  - Cirrhosis secondary to alcohol abuse  - Review principal problem for more elaboration   - No fluid to be tapped in her ascitic fluid  - Consulted Hepatology and following their recommendations.      Thrombocytopenia  - Could be secondary to her chronic liver disease and hypersplenism  - off of dvt ppx as the patient has a oral pharyngeal bleed.     Bipolar disorder in remission  - Hospitalized for crystal and psychosis x 2; also has had depression (total length 4) (duration 3 wks and 1.5 weeks with the others)  - On lithium daily and follow with her psychiatric  - Lithium level is normal  Resumed Lithium   - Will consider risperdal once patient's mental status comes back to baseline.     Anemia    Recent Labs  Lab 01/21/17  1554 01/21/17  2346 01/22/17  0800   HGB 9.9* 10.5* 9.9*   HCT 28.7* 30.4* 29.0*   - secondary to chronic liver disease and potentially nutritional deficiencies       Chronic liver failure  - Review principal problem for more elaboration     Malnutrition  - Will consult dietary for their input.     Alcohol dependence in remission  - No clear history of alcohol use  - Alcohol level is < 10  - Started on thiamine daily and will continue after discharge    Benzodiazepine withdrawal with complication  Giving now 1 mg ativan q 6  hours  Seized before getting ativan dosage this morning  PRN's in place.       Seizure in response to acute event  See problem with benzo withdrawal.       Tongue laceration  Secondary to seizures.   Ordered FFP  IV Vitamin K given.       VTE Risk Mitigation         Ordered     Medium Risk of VTE  Once      01/15/17 1926          Julia Collazo MD  Department of Hospital Medicine   [facility]

## 2017-01-22 NOTE — NURSING
Plan of care reviewed with patient. Pt oriented to self only, unable to verbalize understanding. V/S stable, afebrile throughout shift. Medicated per MAR. Pt free of fall/injury, Bed in lowest position with wheels locked, side rails up x3, avasys camera at bedside, belongings within reach. Will continue to monitor

## 2017-01-22 NOTE — MEDICAL/APP STUDENT
"1/22/2017 9:44 AM   Marely Hamilton   1965   [id number]        Psychiatry Progress Note     SUBJECTIVE:   Patient seen and examined this AM. Pt appeared jaundiced this morning. She was awake and lying in bed. Though she was alone in the room, she believes that her mother is in the room with her and speaking with her. She spoke of being "50%" and needing to "assemble the full team." Several times, she referred to 2 magnets that were conducting a signal that tuned her into the radio. She appeared to be incorporating things that she saw around the room or on TV into her speech. She stated that she did not feel safe. She reported that she had thoughts of harming herself and that safety was her main goal. She denied any pain.      Current Medications:   Scheduled Meds:    clotrimazole  10 mg Oral TID    folic acid  1,000 mcg Oral Daily    lorazepam  1 mg Oral BID    phytonadione ((AQUA-MEPHYTON) IVPB  10 mg Intravenous Daily    thiamine  100 mg Oral Daily      PRN Meds: sodium chloride, dextrose 50%, dextrose 50%, glucagon (human recombinant), glucose, glucose, lorazepam, ondansetron, ramelteon   Psychotherapeutics     Start     Stop Route Frequency Ordered    01/15/17 2050  olanzapine injection 2.5 mg      01/15 2359 IM Once as needed 01/15/17 1950    01/18/17 1609  ramelteon tablet 8 mg      -- Oral Nightly PRN 01/18/17 1509    01/22/17 1100  lorazepam tablet 1 mg      -- Oral 2 times daily 01/22/17 0800    01/22/17 0904  lorazepam tablet 2 mg      -- Oral Every 4 hours PRN 01/22/17 0804          Allergies:   Review of patient's allergies indicates:   Allergen Reactions    Sulfa (sulfonamide antibiotics) Rash        OBJECTIVE:   Vitals   Vitals:    01/22/17 0840   BP: (!) 140/66   Pulse: 79   Resp: 16   Temp: 98.9 °F (37.2 °C)        Labs/Imaging/Studies:   Recent Results (from the past 36 hour(s))   POCT glucose    Collection Time: 01/21/17  4:40 AM   Result Value Ref Range    POCT Glucose 138 (H) 70 - 110 " mg/dL   Comprehensive Metabolic Panel (CMP)    Collection Time: 01/21/17  5:01 AM   Result Value Ref Range    Sodium 141 136 - 145 mmol/L    Potassium 3.5 3.5 - 5.1 mmol/L    Chloride 109 95 - 110 mmol/L    CO2 16 (L) 23 - 29 mmol/L    Glucose 125 (H) 70 - 110 mg/dL    BUN, Bld 7 6 - 20 mg/dL    Creatinine 0.7 0.5 - 1.4 mg/dL    Calcium 9.2 8.7 - 10.5 mg/dL    Total Protein 7.2 6.0 - 8.4 g/dL    Albumin 3.3 (L) 3.5 - 5.2 g/dL    Total Bilirubin 7.5 (H) 0.1 - 1.0 mg/dL    Alkaline Phosphatase 235 (H) 55 - 135 U/L    AST 94 (H) 10 - 40 U/L    ALT 51 (H) 10 - 44 U/L    Anion Gap 16 8 - 16 mmol/L    eGFR if African American >60.0 >60 mL/min/1.73 m^2    eGFR if non African American >60.0 >60 mL/min/1.73 m^2   PT/INR    Collection Time: 01/21/17  5:01 AM   Result Value Ref Range    Prothrombin Time 18.5 (H) 9.0 - 12.5 sec    INR 1.8 (H) 0.8 - 1.2   Magnesium    Collection Time: 01/21/17  5:01 AM   Result Value Ref Range    Magnesium 1.6 1.6 - 2.6 mg/dL   CBC auto differential    Collection Time: 01/21/17  5:01 AM   Result Value Ref Range    WBC 6.43 3.90 - 12.70 K/uL    RBC 3.33 (L) 4.00 - 5.40 M/uL    Hemoglobin 10.9 (L) 12.0 - 16.0 g/dL    Hematocrit 32.0 (L) 37.0 - 48.5 %    MCV 96 82 - 98 fL    MCH 32.7 (H) 27.0 - 31.0 pg    MCHC 34.1 32.0 - 36.0 %    RDW 17.1 (H) 11.5 - 14.5 %    Platelets 64 (L) 150 - 350 K/uL    MPV 9.5 9.2 - 12.9 fL    Gran # 3.6 1.8 - 7.7 K/uL    Lymph # 1.9 1.0 - 4.8 K/uL    Mono # 0.6 0.3 - 1.0 K/uL    Eos # 0.3 0.0 - 0.5 K/uL    Baso # 0.03 0.00 - 0.20 K/uL    Gran% 56.0 38.0 - 73.0 %    Lymph% 29.5 18.0 - 48.0 %    Mono% 9.3 4.0 - 15.0 %    Eosinophil% 4.4 0.0 - 8.0 %    Basophil% 0.5 0.0 - 1.9 %    Differential Method Automated    Hemoglobin    Collection Time: 01/21/17  5:01 AM   Result Value Ref Range    Hemoglobin 10.9 (L) 12.0 - 16.0 g/dL   Hematocrit    Collection Time: 01/21/17  5:01 AM   Result Value Ref Range    Hematocrit 32.0 (L) 37.0 - 48.5 %   CBC auto differential    Collection  Time: 01/21/17  5:01 AM   Result Value Ref Range    WBC 6.43 3.90 - 12.70 K/uL    RBC 3.33 (L) 4.00 - 5.40 M/uL    Hemoglobin 10.9 (L) 12.0 - 16.0 g/dL    Hematocrit 32.0 (L) 37.0 - 48.5 %    MCV 96 82 - 98 fL    MCH 32.7 (H) 27.0 - 31.0 pg    MCHC 34.1 32.0 - 36.0 %    RDW 17.1 (H) 11.5 - 14.5 %    Platelets 64 (L) 150 - 350 K/uL    MPV 9.5 9.2 - 12.9 fL    Gran # 3.6 1.8 - 7.7 K/uL    Lymph # 1.9 1.0 - 4.8 K/uL    Mono # 0.6 0.3 - 1.0 K/uL    Eos # 0.3 0.0 - 0.5 K/uL    Baso # 0.03 0.00 - 0.20 K/uL    Gran% 56.0 38.0 - 73.0 %    Lymph% 29.5 18.0 - 48.0 %    Mono% 9.3 4.0 - 15.0 %    Eosinophil% 4.4 0.0 - 8.0 %    Basophil% 0.5 0.0 - 1.9 %    Differential Method Automated    Comprehensive metabolic panel    Collection Time: 01/21/17  5:01 AM   Result Value Ref Range    Sodium 141 136 - 145 mmol/L    Potassium 3.5 3.5 - 5.1 mmol/L    Chloride 109 95 - 110 mmol/L    CO2 16 (L) 23 - 29 mmol/L    Glucose 125 (H) 70 - 110 mg/dL    BUN, Bld 7 6 - 20 mg/dL    Creatinine 0.7 0.5 - 1.4 mg/dL    Calcium 9.2 8.7 - 10.5 mg/dL    Total Protein 7.2 6.0 - 8.4 g/dL    Albumin 3.3 (L) 3.5 - 5.2 g/dL    Total Bilirubin 7.5 (H) 0.1 - 1.0 mg/dL    Alkaline Phosphatase 235 (H) 55 - 135 U/L    AST 94 (H) 10 - 40 U/L    ALT 51 (H) 10 - 44 U/L    Anion Gap 16 8 - 16 mmol/L    eGFR if African American >60.0 >60 mL/min/1.73 m^2    eGFR if non African American >60.0 >60 mL/min/1.73 m^2   Protime-INR    Collection Time: 01/21/17  5:01 AM   Result Value Ref Range    Prothrombin Time 18.5 (H) 9.0 - 12.5 sec    INR 1.8 (H) 0.8 - 1.2   Type & Screen    Collection Time: 01/21/17  5:16 AM   Result Value Ref Range    Group & Rh O POS     Indirect Ameya NEG    Prepare RBC 1 Unit    Collection Time: 01/21/17  5:16 AM   Result Value Ref Range    UNIT NUMBER [id number]     PRODUCT CODE K1738U65     DISPENSE STATUS CROSSMATCHED     CODING SYSTEM MLBB130     Unit Blood Type Code 5100     Unit Blood Type O POS     Unit Expiration [id number]    CBC  auto differential    Collection Time: 01/21/17  9:22 AM   Result Value Ref Range    WBC 4.70 3.90 - 12.70 K/uL    RBC 3.21 (L) 4.00 - 5.40 M/uL    Hemoglobin 10.4 (L) 12.0 - 16.0 g/dL    Hematocrit 30.8 (L) 37.0 - 48.5 %    MCV 96 82 - 98 fL    MCH 32.4 (H) 27.0 - 31.0 pg    MCHC 33.8 32.0 - 36.0 %    RDW 17.2 (H) 11.5 - 14.5 %    Platelets 55 (L) 150 - 350 K/uL    MPV 10.0 9.2 - 12.9 fL    Gran # 3.3 1.8 - 7.7 K/uL    Lymph # 0.9 (L) 1.0 - 4.8 K/uL    Mono # 0.4 0.3 - 1.0 K/uL    Eos # 0.1 0.0 - 0.5 K/uL    Baso # 0.01 0.00 - 0.20 K/uL    Gran% 69.9 38.0 - 73.0 %    Lymph% 19.1 18.0 - 48.0 %    Mono% 8.5 4.0 - 15.0 %    Eosinophil% 2.1 0.0 - 8.0 %    Basophil% 0.2 0.0 - 1.9 %    Differential Method Automated    Protime-INR    Collection Time: 01/21/17  9:22 AM   Result Value Ref Range    Prothrombin Time 18.7 (H) 9.0 - 12.5 sec    INR 1.9 (H) 0.8 - 1.2   CBC auto differential    Collection Time: 01/21/17 11:41 AM   Result Value Ref Range    WBC 6.83 3.90 - 12.70 K/uL    RBC 3.15 (L) 4.00 - 5.40 M/uL    Hemoglobin 10.2 (L) 12.0 - 16.0 g/dL    Hematocrit 29.7 (L) 37.0 - 48.5 %    MCV 94 82 - 98 fL    MCH 32.4 (H) 27.0 - 31.0 pg    MCHC 34.3 32.0 - 36.0 %    RDW 17.4 (H) 11.5 - 14.5 %    Platelets 56 (L) 150 - 350 K/uL    MPV 10.1 9.2 - 12.9 fL    Gran # 4.3 1.8 - 7.7 K/uL    Lymph # 1.4 1.0 - 4.8 K/uL    Mono # 0.9 0.3 - 1.0 K/uL    Eos # 0.2 0.0 - 0.5 K/uL    Baso # 0.03 0.00 - 0.20 K/uL    Gran% 63.2 38.0 - 73.0 %    Lymph% 20.9 18.0 - 48.0 %    Mono% 12.7 4.0 - 15.0 %    Eosinophil% 2.5 0.0 - 8.0 %    Basophil% 0.4 0.0 - 1.9 %    Differential Method Automated    CBC auto differential    Collection Time: 01/21/17  3:54 PM   Result Value Ref Range    WBC 7.48 3.90 - 12.70 K/uL    RBC 3.04 (L) 4.00 - 5.40 M/uL    Hemoglobin 9.9 (L) 12.0 - 16.0 g/dL    Hematocrit 28.7 (L) 37.0 - 48.5 %    MCV 94 82 - 98 fL    MCH 32.6 (H) 27.0 - 31.0 pg    MCHC 34.5 32.0 - 36.0 %    RDW 17.3 (H) 11.5 - 14.5 %    Platelets 53 (L) 150 -  350 K/uL    MPV 9.8 9.2 - 12.9 fL    Gran # 4.6 1.8 - 7.7 K/uL    Lymph # 1.6 1.0 - 4.8 K/uL    Mono # 1.1 (H) 0.3 - 1.0 K/uL    Eos # 0.2 0.0 - 0.5 K/uL    Baso # 0.04 0.00 - 0.20 K/uL    Gran% 60.9 38.0 - 73.0 %    Lymph% 21.4 18.0 - 48.0 %    Mono% 14.3 4.0 - 15.0 %    Eosinophil% 2.5 0.0 - 8.0 %    Basophil% 0.5 0.0 - 1.9 %    Differential Method Automated    CBC auto differential    Collection Time: 01/21/17 11:46 PM   Result Value Ref Range    WBC 12.09 3.90 - 12.70 K/uL    RBC 3.16 (L) 4.00 - 5.40 M/uL    Hemoglobin 10.5 (L) 12.0 - 16.0 g/dL    Hematocrit 30.4 (L) 37.0 - 48.5 %    MCV 96 82 - 98 fL    MCH 33.2 (H) 27.0 - 31.0 pg    MCHC 34.5 32.0 - 36.0 %    RDW 16.9 (H) 11.5 - 14.5 %    Platelets 71 (L) 150 - 350 K/uL    MPV 9.8 9.2 - 12.9 fL    Gran # 8.4 (H) 1.8 - 7.7 K/uL    Lymph # 2.3 1.0 - 4.8 K/uL    Mono # 1.1 (H) 0.3 - 1.0 K/uL    Eos # 0.2 0.0 - 0.5 K/uL    Baso # 0.05 0.00 - 0.20 K/uL    Gran% 69.4 38.0 - 73.0 %    Lymph% 18.9 18.0 - 48.0 %    Mono% 9.4 4.0 - 15.0 %    Eosinophil% 1.5 0.0 - 8.0 %    Basophil% 0.4 0.0 - 1.9 %    Differential Method Automated    Comprehensive Metabolic Panel (CMP)    Collection Time: 01/22/17  6:22 AM   Result Value Ref Range    Sodium 136 136 - 145 mmol/L    Potassium 4.1 3.5 - 5.1 mmol/L    Chloride 106 95 - 110 mmol/L    CO2 23 23 - 29 mmol/L    Glucose 121 (H) 70 - 110 mg/dL    BUN, Bld 7 6 - 20 mg/dL    Creatinine 0.6 0.5 - 1.4 mg/dL    Calcium 8.4 (L) 8.7 - 10.5 mg/dL    Total Protein 6.3 6.0 - 8.4 g/dL    Albumin 2.9 (L) 3.5 - 5.2 g/dL    Total Bilirubin 10.0 (H) 0.1 - 1.0 mg/dL    Alkaline Phosphatase 138 (H) 55 - 135 U/L    AST 72 (H) 10 - 40 U/L    ALT 40 10 - 44 U/L    Anion Gap 7 (L) 8 - 16 mmol/L    eGFR if African American >60.0 >60 mL/min/1.73 m^2    eGFR if non African American >60.0 >60 mL/min/1.73 m^2   Magnesium    Collection Time: 01/22/17  6:22 AM   Result Value Ref Range    Magnesium 1.3 (L) 1.6 - 2.6 mg/dL   PT/INR    Collection Time: 01/22/17  " 6:22 AM   Result Value Ref Range    Prothrombin Time 17.1 (H) 9.0 - 12.5 sec    INR 1.7 (H) 0.8 - 1.2   CBC auto differential    Collection Time: 01/22/17  8:00 AM   Result Value Ref Range    WBC 8.02 3.90 - 12.70 K/uL    RBC 3.01 (L) 4.00 - 5.40 M/uL    Hemoglobin 9.9 (L) 12.0 - 16.0 g/dL    Hematocrit 29.0 (L) 37.0 - 48.5 %    MCV 96 82 - 98 fL    MCH 32.9 (H) 27.0 - 31.0 pg    MCHC 34.1 32.0 - 36.0 %    RDW 16.8 (H) 11.5 - 14.5 %    Platelets 58 (L) 150 - 350 K/uL    MPV 9.7 9.2 - 12.9 fL    Gran # 5.4 1.8 - 7.7 K/uL    Lymph # 1.7 1.0 - 4.8 K/uL    Mono # 0.7 0.3 - 1.0 K/uL    Eos # 0.2 0.0 - 0.5 K/uL    Baso # 0.03 0.00 - 0.20 K/uL    Gran% 67.1 38.0 - 73.0 %    Lymph% 21.1 18.0 - 48.0 %    Mono% 8.6 4.0 - 15.0 %    Eosinophil% 2.4 0.0 - 8.0 %    Basophil% 0.4 0.0 - 1.9 %    Differential Method Automated         Mental Status Exam:   Arousal: awake, lying in bed   Appearance: jaundiced, disheveled   Sensorium/Orientation: oriented to person and place, not oriented to time  Grooming: poor  Behavior/Cooperation: cooperative  Psychomotor: no PMA/PMR  Speech: normal rate, rhythm, and tone     Language: English, occasional Greenlandic  Mood: "ok"   Affect: blunted   Thought Process: impaired   Thought Content: endorses SI w/ no plan, denies HI, active AVH - Pt believes mother is in her room with her and is talking with her  Associations: loose associations based on objects in the room, things seen on tv, and words and images on the board in her room  Attention/Concentration: distractible  Memory: impaired  Fund of Knowledge: impaired   Insight: impaired   Judgment: impaired    ASSESSMENT/PLAN:     52yo female w/ Hx of bipolar disorder and alcohol use disorder. She is currently admitted for decompensated alcoholic cirrhosis and hepatic encephalopathy. Likely suffering from withdrawal from alcohol and benzodiazepines.      Recommendations:    - Continue Ativan 1mg po BID for benzo withdrawal  - Continue Ativan 2mg po " every 4 hours PRN - systolic blood pressure > 160, Diastolic Blood pressure >110, heart rate > 110, tremors, diaphoresis, or other signs of withdrawal.  - Multivitamin, Thiamine, and Folate QD  - Recommend Lithium 300 mg po QHS, she has been unable to tolerate higher does in the past  - Per EKG performed on 1/20/17, QTc was 490. Recommend repeat EKG to assess QTc. If QTc <450, consider adding Risperdal 0.5mg po QHS to regulate sleep/wake cycle during delerium 2/2 hepatic encephalopathy. She has tolerated Risperdal in the past.        Rangel Maya, L3  [facility] [employer]  [facility]

## 2017-01-22 NOTE — PROGRESS NOTES
GI Follow-up Note      Chart reviewed.  H/H remains stable.  Pt with another seizure this AM      Plan:  No evidence of further bleeding, do no suspect variceal bleed  Continue supportive care per primary  No need for endoscopic eval at this time.    Will sign off at this time. Please call with any additional concerns /questions    Jermain Youssef MD  Gastroenterology Fellow (PGY-[id number])  Pager: [phone]

## 2017-01-22 NOTE — ASSESSMENT & PLAN NOTE
- Hospitalized for crystal and psychosis x 2; also has had depression (total length 4) (duration 3 wks and 1.5 weeks with the others)  - On lithium daily and follow with her psychiatric  - Lithium level is normal  Resumed Lithium   - Will consider risperdal once patient's mental status comes back to baseline.

## 2017-01-22 NOTE — ASSESSMENT & PLAN NOTE
Recent Labs  Lab 01/21/17  1554 01/21/17  2346 01/22/17  0800   HGB 9.9* 10.5* 9.9*   HCT 28.7* 30.4* 29.0*   - secondary to chronic liver disease and potentially nutritional deficiencies

## 2017-01-22 NOTE — ASSESSMENT & PLAN NOTE
- Cirrhosis secondary to alcohol abuse  - Review principal problem for more elaboration   - No fluid to be tapped in her ascitic fluid  - Consulted Hepatology and following their recommendations.

## 2017-01-22 NOTE — ASSESSMENT & PLAN NOTE
Giving now 1 mg ativan q 6 hours  Seized before getting ativan dosage this morning  PRN's in place.

## 2017-01-23 PROBLEM — F13.932: Status: ACTIVE | Noted: 2017-01-23

## 2017-01-23 PROBLEM — R56.9 SEIZURE IN RESPONSE TO ACUTE EVENT: Status: RESOLVED | Noted: 2017-01-22 | Resolved: 2017-01-23

## 2017-01-23 PROBLEM — F10.90 ALCOHOL USE DISORDER: Status: ACTIVE | Noted: 2017-01-23

## 2017-01-23 LAB
ALBUMIN SERPL BCP-MCNC: 2.8 G/DL
ALP SERPL-CCNC: 121 U/L
ALT SERPL W/O P-5'-P-CCNC: 35 U/L
ANION GAP SERPL CALC-SCNC: 7 MMOL/L
AST SERPL-CCNC: 65 U/L
BASOPHILS # BLD AUTO: 0.02 K/UL
BASOPHILS # BLD AUTO: 0.02 K/UL
BASOPHILS # BLD AUTO: 0.03 K/UL
BASOPHILS # BLD AUTO: 0.03 K/UL
BASOPHILS NFR BLD: 0.3 %
BASOPHILS NFR BLD: 0.3 %
BASOPHILS NFR BLD: 0.4 %
BASOPHILS NFR BLD: 0.4 %
BILIRUB SERPL-MCNC: 9.4 MG/DL
BUN SERPL-MCNC: 8 MG/DL
CALCIUM SERPL-MCNC: 8.4 MG/DL
CHLORIDE SERPL-SCNC: 104 MMOL/L
CO2 SERPL-SCNC: 25 MMOL/L
CREAT SERPL-MCNC: 0.6 MG/DL
DIFFERENTIAL METHOD: ABNORMAL
EOSINOPHIL # BLD AUTO: 0.1 K/UL
EOSINOPHIL # BLD AUTO: 0.1 K/UL
EOSINOPHIL # BLD AUTO: 0.2 K/UL
EOSINOPHIL # BLD AUTO: 0.2 K/UL
EOSINOPHIL NFR BLD: 1.4 %
EOSINOPHIL NFR BLD: 1.8 %
EOSINOPHIL NFR BLD: 2.4 %
EOSINOPHIL NFR BLD: 2.4 %
ERYTHROCYTE [DISTWIDTH] IN BLOOD BY AUTOMATED COUNT: 16.2 %
ERYTHROCYTE [DISTWIDTH] IN BLOOD BY AUTOMATED COUNT: 16.3 %
ERYTHROCYTE [DISTWIDTH] IN BLOOD BY AUTOMATED COUNT: 16.3 %
ERYTHROCYTE [DISTWIDTH] IN BLOOD BY AUTOMATED COUNT: 16.4 %
EST. GFR  (AFRICAN AMERICAN): >60 ML/MIN/1.73 M^2
EST. GFR  (NON AFRICAN AMERICAN): >60 ML/MIN/1.73 M^2
GLUCOSE SERPL-MCNC: 111 MG/DL
HCT VFR BLD AUTO: 26.9 %
HCT VFR BLD AUTO: 26.9 %
HCT VFR BLD AUTO: 30.1 %
HCT VFR BLD AUTO: 30.4 %
HGB BLD-MCNC: 10.1 G/DL
HGB BLD-MCNC: 10.4 G/DL
HGB BLD-MCNC: 9.2 G/DL
HGB BLD-MCNC: 9.4 G/DL
INR PPP: 1.5
LYMPHOCYTES # BLD AUTO: 1.5 K/UL
LYMPHOCYTES # BLD AUTO: 1.6 K/UL
LYMPHOCYTES # BLD AUTO: 1.7 K/UL
LYMPHOCYTES # BLD AUTO: 2 K/UL
LYMPHOCYTES NFR BLD: 19.1 %
LYMPHOCYTES NFR BLD: 23.2 %
LYMPHOCYTES NFR BLD: 24 %
LYMPHOCYTES NFR BLD: 25.4 %
MAGNESIUM SERPL-MCNC: 1.4 MG/DL
MCH RBC QN AUTO: 32.2 PG
MCH RBC QN AUTO: 32.4 PG
MCH RBC QN AUTO: 32.6 PG
MCH RBC QN AUTO: 32.8 PG
MCHC RBC AUTO-ENTMCNC: 33.6 %
MCHC RBC AUTO-ENTMCNC: 34.2 %
MCHC RBC AUTO-ENTMCNC: 34.2 %
MCHC RBC AUTO-ENTMCNC: 34.9 %
MCV RBC AUTO: 93 FL
MCV RBC AUTO: 94 FL
MCV RBC AUTO: 96 FL
MCV RBC AUTO: 97 FL
MONOCYTES # BLD AUTO: 0.5 K/UL
MONOCYTES # BLD AUTO: 0.5 K/UL
MONOCYTES # BLD AUTO: 0.7 K/UL
MONOCYTES # BLD AUTO: 0.8 K/UL
MONOCYTES NFR BLD: 10.6 %
MONOCYTES NFR BLD: 6.5 %
MONOCYTES NFR BLD: 7 %
MONOCYTES NFR BLD: 8.5 %
NEUTROPHILS # BLD AUTO: 4.6 K/UL
NEUTROPHILS # BLD AUTO: 4.8 K/UL
NEUTROPHILS # BLD AUTO: 4.8 K/UL
NEUTROPHILS # BLD AUTO: 5.5 K/UL
NEUTROPHILS NFR BLD: 60.8 %
NEUTROPHILS NFR BLD: 65.9 %
NEUTROPHILS NFR BLD: 67.8 %
NEUTROPHILS NFR BLD: 70.3 %
PLATELET # BLD AUTO: 53 K/UL
PLATELET # BLD AUTO: 54 K/UL
PLATELET # BLD AUTO: 60 K/UL
PLATELET # BLD AUTO: 63 K/UL
PMV BLD AUTO: 10 FL
PMV BLD AUTO: 9.4 FL
PMV BLD AUTO: 9.5 FL
PMV BLD AUTO: 9.7 FL
POTASSIUM SERPL-SCNC: 4 MMOL/L
PROT SERPL-MCNC: 6.2 G/DL
PROTHROMBIN TIME: 15.3 SEC
RBC # BLD AUTO: 2.86 M/UL
RBC # BLD AUTO: 2.88 M/UL
RBC # BLD AUTO: 3.12 M/UL
RBC # BLD AUTO: 3.17 M/UL
SODIUM SERPL-SCNC: 136 MMOL/L
WBC # BLD AUTO: 6.96 K/UL
WBC # BLD AUTO: 7.08 K/UL
WBC # BLD AUTO: 7.8 K/UL
WBC # BLD AUTO: 7.92 K/UL

## 2017-01-23 PROCEDURE — 63600175 PHARM REV CODE 636 W HCPCS: Performed by: HOSPITALIST

## 2017-01-23 PROCEDURE — 25000003 PHARM REV CODE 250: Performed by: STUDENT IN AN ORGANIZED HEALTH CARE EDUCATION/TRAINING PROGRAM

## 2017-01-23 PROCEDURE — 25000003 PHARM REV CODE 250: Performed by: INTERNAL MEDICINE

## 2017-01-23 PROCEDURE — 99233 SBSQ HOSP IP/OBS HIGH 50: CPT | Mod: GC,,, | Performed by: PSYCHIATRY & NEUROLOGY

## 2017-01-23 PROCEDURE — 80053 COMPREHEN METABOLIC PANEL: CPT

## 2017-01-23 PROCEDURE — 25000003 PHARM REV CODE 250: Performed by: HOSPITALIST

## 2017-01-23 PROCEDURE — 92610 EVALUATE SWALLOWING FUNCTION: CPT

## 2017-01-23 PROCEDURE — 63600175 PHARM REV CODE 636 W HCPCS: Performed by: INTERNAL MEDICINE

## 2017-01-23 PROCEDURE — 36415 COLL VENOUS BLD VENIPUNCTURE: CPT

## 2017-01-23 PROCEDURE — 63600175 PHARM REV CODE 636 W HCPCS: Performed by: STUDENT IN AN ORGANIZED HEALTH CARE EDUCATION/TRAINING PROGRAM

## 2017-01-23 PROCEDURE — 85610 PROTHROMBIN TIME: CPT

## 2017-01-23 PROCEDURE — 99233 SBSQ HOSP IP/OBS HIGH 50: CPT | Mod: GC,,, | Performed by: INTERNAL MEDICINE

## 2017-01-23 PROCEDURE — 99233 SBSQ HOSP IP/OBS HIGH 50: CPT | Mod: GC,,, | Performed by: HOSPITALIST

## 2017-01-23 PROCEDURE — 83735 ASSAY OF MAGNESIUM: CPT

## 2017-01-23 PROCEDURE — 85025 COMPLETE CBC W/AUTO DIFF WBC: CPT

## 2017-01-23 PROCEDURE — 11000001 HC ACUTE MED/SURG PRIVATE ROOM

## 2017-01-23 RX ORDER — MAGNESIUM SULFATE HEPTAHYDRATE 40 MG/ML
2 INJECTION, SOLUTION INTRAVENOUS
Status: COMPLETED | OUTPATIENT
Start: 2017-01-23 | End: 2017-01-23

## 2017-01-23 RX ORDER — ZINC SULFATE 50(220)MG
220 CAPSULE ORAL DAILY
Status: DISCONTINUED | OUTPATIENT
Start: 2017-01-23 | End: 2017-01-26 | Stop reason: HOSPADM

## 2017-01-23 RX ORDER — RISPERIDONE 1 MG/ML
0.5 SOLUTION ORAL NIGHTLY
Status: DISCONTINUED | OUTPATIENT
Start: 2017-01-23 | End: 2017-01-26 | Stop reason: HOSPADM

## 2017-01-23 RX ORDER — LORAZEPAM 1 MG/1
1 TABLET ORAL EVERY 6 HOURS
Status: DISCONTINUED | OUTPATIENT
Start: 2017-01-23 | End: 2017-01-25

## 2017-01-23 RX ADMIN — RIFAXIMIN 550 MG: 550 TABLET ORAL at 09:01

## 2017-01-23 RX ADMIN — CLOTRIMAZOLE 10 MG: 10 LOZENGE ORAL at 05:01

## 2017-01-23 RX ADMIN — FOLIC ACID 1000 MCG: 1 TABLET ORAL at 09:01

## 2017-01-23 RX ADMIN — LORAZEPAM 1 MG: 2 INJECTION INTRAMUSCULAR; INTRAVENOUS at 05:01

## 2017-01-23 RX ADMIN — MICONAZOLE NITRATE: 20 CREAM TOPICAL at 09:01

## 2017-01-23 RX ADMIN — LACTULOSE 15 G: 10 SOLUTION ORAL at 09:01

## 2017-01-23 RX ADMIN — MAGNESIUM SULFATE IN WATER 2 G: 40 INJECTION, SOLUTION INTRAVENOUS at 07:01

## 2017-01-23 RX ADMIN — RISPERIDONE 0.5 MG: 1 SOLUTION ORAL at 09:01

## 2017-01-23 RX ADMIN — LACTULOSE 15 G: 10 SOLUTION ORAL at 01:01

## 2017-01-23 RX ADMIN — PHYTONADIONE 10 MG: 10 INJECTION, EMULSION INTRAMUSCULAR; INTRAVENOUS; SUBCUTANEOUS at 01:01

## 2017-01-23 RX ADMIN — MICONAZOLE NITRATE: 20 CREAM TOPICAL at 12:01

## 2017-01-23 RX ADMIN — LORAZEPAM 1 MG: 2 INJECTION INTRAMUSCULAR; INTRAVENOUS at 12:01

## 2017-01-23 RX ADMIN — Medication 100 MG: at 09:01

## 2017-01-23 RX ADMIN — Medication 220 MG: at 04:01

## 2017-01-23 RX ADMIN — CLOTRIMAZOLE 10 MG: 10 LOZENGE ORAL at 01:01

## 2017-01-23 RX ADMIN — LORAZEPAM 1 MG: 1 TABLET ORAL at 05:01

## 2017-01-23 RX ADMIN — LACTULOSE 15 G: 10 SOLUTION ORAL at 05:01

## 2017-01-23 RX ADMIN — MAGNESIUM SULFATE IN WATER 2 G: 40 INJECTION, SOLUTION INTRAVENOUS at 04:01

## 2017-01-23 NOTE — PLAN OF CARE
CM in to see pt and still very confused, jaundiced.  Pt pending family with decision of possible liver workup for transplant.  Will continue to follow.

## 2017-01-23 NOTE — ASSESSMENT & PLAN NOTE
- Non compliance history of her lactulose.   - Ammonia at presentation was 50   - complicated by her administration of Klonopin.   - Delirium precaution   Daytime: awake, up in chair as tolerated, tv on, window shades up, frequent reorientation.  Nighttime: in bed, minimize interruptions, tv off, window shades down.  - lactulose and rifxamin has improved her mental status.   - Hepatitis panel and HIV negative  - PETH negative   - Hepatology will decide for transplant work up with patient and her sister and they tried to reach her sister and communicate regarding liver transplant workup. If failed to reach sister will communicate mother. If no transplant workup will be initiated, palliative consult is imperative.   - Started on long term vitamin treatment.  - Hepatology recommended to start zinc sulfate capsule 220 mg  - NPO past midnight.    MELD-Na score: 20 at 1/23/2017  5:55 AM  MELD score: 19 at 1/23/2017  5:55 AM  Calculated from:  Serum Creatinine: 0.6 mg/dL (Rounded to 1) at 1/23/2017  5:55 AM  Serum Sodium: 136 mmol/L at 1/23/2017  5:55 AM  Total Bilirubin: 9.4 mg/dL at 1/23/2017  5:55 AM  INR(ratio): 1.5 at 1/23/2017  5:55 AM  Age: 51 years

## 2017-01-23 NOTE — ASSESSMENT & PLAN NOTE
Recent Labs  Lab 01/22/17  2321 01/23/17  0746 01/23/17  1606   HGB 9.4* 10.4* 10.1*   HCT 26.9* 30.4* 30.1*   - secondary to chronic liver disease and potentially nutritional deficiencies

## 2017-01-23 NOTE — SUBJECTIVE & OBJECTIVE
Interval History:     No acute events happened, she is currently having better communication and more talkative more than previous days. Ortinted.      Review of Systems   Constitutional: Positive for fatigue. Negative for activity change, appetite change, chills, diaphoresis and fever.   HENT: Negative for congestion and dental problem.    Eyes: Negative for photophobia, pain, discharge and redness.   Respiratory: Negative for apnea, cough, choking and shortness of breath.    Cardiovascular: Negative for chest pain, palpitations and leg swelling.   Gastrointestinal: Negative for abdominal distention, abdominal pain, anal bleeding and blood in stool.   Genitourinary: Negative for difficulty urinating, dysuria, flank pain and frequency.   Musculoskeletal: Negative for back pain and gait problem.   Skin: Positive for color change and pallor.   Neurological: Positive for seizures. Negative for dizziness, facial asymmetry and headaches.   Hematological: Does not bruise/bleed easily.     Objective:     Vital Signs (Most Recent):  Temp: 98.6 °F (37 °C) (01/23/17 1630)  Pulse: 97 (01/23/17 1630)  Resp: 16 (01/23/17 1630)  BP: 120/63 (01/23/17 1630)  SpO2: 96 % (01/23/17 1630) Vital Signs (24h Range):  Temp:  [97.9 °F (36.6 °C)-100 °F (37.8 °C)] 98.6 °F (37 °C)  Pulse:  [77-97] 97  Resp:  [14-18] 16  SpO2:  [93 %-96 %] 96 %  BP: (118-144)/(59-76) 120/63     Weight: 49.4 kg (109 lb)  Body mass index is 19.31 kg/(m^2).  No intake or output data in the 24 hours ending 01/23/17 1657   Physical Exam   Constitutional: She appears well-developed. No distress.   HENT:   Sclerae of face and conjunctivae    Eyes: Right eye exhibits no discharge. Left eye exhibits no discharge. Scleral icterus is present.   Neck: Normal range of motion. Neck supple. No JVD present. No thyromegaly present.   Cardiovascular: Normal rate and regular rhythm.  Exam reveals no friction rub.    No murmur heard.  Pulmonary/Chest: Effort normal and breath  sounds normal. No respiratory distress. She has no wheezes. She has no rales.   Abdominal: Soft. Bowel sounds are normal. She exhibits no distension. There is no tenderness.   Negative for hepatic asterixis.    Neurological: No cranial nerve deficit. Coordination normal.   conscious but only oriented to person and place.    Skin: She is not diaphoretic.   Vitals reviewed.        Recent Labs  Lab 01/21/17  0501 01/22/17  0622 01/23/17  0555     141 136 136   K 3.5  3.5 4.1 4.0     109 106 104   CO2 16*  16* 23 25   BUN 7  7 7 8   CREATININE 0.7  0.7 0.6 0.6   CALCIUM 9.2  9.2 8.4* 8.4*   MG 1.6 1.3* 1.4*        Recent Labs  Lab 01/21/17  0501 01/22/17  0622 01/23/17  0555   ALBUMIN 3.3*  3.3* 2.9* 2.8*   PROT 7.2  7.2 6.3 6.2   BILITOT 7.5*  7.5* 10.0* 9.4*   ALKPHOS 235*  235* 138* 121   ALT 51*  51* 40 35   AST 94*  94* 72* 65*

## 2017-01-23 NOTE — PROGRESS NOTES
"Hepatology  Progress note      SUBJECTIVE:     Reason for Consult: ESLD    Presentation history:  Patient is a 51 y.o. 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    No family presently at bedside, however the mother had reported that she had noticed a progressive decline in mental function over the past week.  She was unsure if the patient was taking her lactulose as prescribed.  No reported ETOH use, however the mother was unsure of this.  No known fevers, chills, rigors.      The patient was previously worked up for liver transplant at [facility] and was in workup for OLT here at [facility] last Spring, however she stopped her evaluation because she said her [facility] physician told her she "didn't need a transplant."  No further follow up since March, 2016.    Interval Hx: Patient had a seizure overnight over the weekend-- lost bowel and bladder continence, likely with tongue biting causing oral bleeding.  Patient continues with delirium, pressured speech, she is oriented to person, date, situation, but confused about which hospital she is at at times.    OBJECTIVE:     Vital Signs (Most Recent)  Temp: 98.3 °F (36.8 °C) (01/23/17 0800)  Pulse: 86 (01/23/17 0800)  Resp: 14 (01/23/17 0800)  BP: 136/69 (01/23/17 0800)  SpO2: (!) 94 % (01/23/17 0800)    Physical Exam:  General appearance: no acute distress;   Eyes: scleral icterus  Lungs: breath sounds vesicular in nature, air entry equal bilaterally,   Heart: regular rate and rhythm, S1, S2 clearly audible,  Abdomen: soft, non-tender, non-distended;  no rebound tenderness, rigidity, or guarding  Extremities: clubbing bilaterally  Pulses: 2+ and symmetric  Skin: Skin color, texture, turgor normal.   Neurologic: no focal deficits noted    Laboratory    CBC:     Recent Labs  Lab 01/22/17  1629 01/22/17  2321 01/23/17  0746   WBC 7.14 7.92 7.80   RBC 3.05* 2.88* 3.17*   HGB 9.9* 9.4* " 10.4*   HCT 28.5* 26.9* 30.4*   PLT 55* 53* 63*   MCV 93 93 96   MCH 32.5* 32.6* 32.8*   MCHC 34.7 34.9 34.2     BMP:     Recent Labs  Lab 01/21/17  0501 01/22/17  0622 01/23/17  0555   *  125* 121* 111*     141 136 136   K 3.5  3.5 4.1 4.0     109 106 104   CO2 16*  16* 23 25   BUN 7  7 7 8   CREATININE 0.7  0.7 0.6 0.6   CALCIUM 9.2  9.2 8.4* 8.4*   MG 1.6 1.3* 1.4*     Coagulation:     Recent Labs  Lab 01/23/17  0555   INR 1.5*           ASSESSMENT/PLAN:     ESLD 2/2 ETOH presenting with hepatic encephalopathy likely 2/2 medication noncompliance.  Patient has no obvious ascites for drainage by U/S, her UA was not consistent with infection.  She has no white count and no fevers.      PETH negative    1/21: ?Benzo withdrawal seizure    Recommendations:     Appreciate Psych recs    Appreciate GI recs: likely oral bleeding sustained during seizure.  No convincing evidence of Upper GI bleed    Continue lactulose with dose titrated to have 3-4 liquid BM's per day  Continue Rifaximin 500mg BID  Zinc Sulfate  Check daily CMP    Will discuss the patients case with her hepatologist from [facility] who was managing her liver disease, and discuss possible transfer to [facility] and we will keep the primary team updated on this progress    Roman Rucker   Gastroenterology Fellow PGY V

## 2017-01-23 NOTE — PROGRESS NOTES
"[facility] Hospital Medicine  Progress Note    Patient Name: Marely Hamilton  MRN: [id number]  Patient Class: IP- Inpatient   Admission Date: 1/15/2017  Length of Stay: 8 days  Attending Physician: Blessing Cherry MD  Primary Care Provider: Viktor Ross MD    [facility] Medicine Team: [facility] [place] [facility] 5 Adin Sawyer MD    Subjective:     Principal Problem:Hepatic encephalopathy    HPI:  Marely Hamilton, 51 year female known to have Liver Cirrhosis and ESLD secondary to alcohol abuse, Bipolar disorder she was brought to ED by her mother after she noticed increasing confusion and skin discoloration of several days duration prior to admission.    When hospital medicine evaluated the patient at bedside, she was by herself and not accompanied by anyone, patient was not good historian and she couldn't reply the questions they were asking. Primary team called the mother Joy Oconnor [phone] and asked questions about her current presentation. So based on mothers' conversation over the phone she claimed that she was non complaint with her medication especially Lactulose and she did not keep track of her bowel movement on daily basis. Mother also mentioned that she was having some change in her basal mentation and when you ask her question and doesn't answer question and taking about her sister and cats and "doesn't make sense". Patient lives with her mother. Her mother noticed change in her face color and eyes color in the last couple of weeks more noticeably in the last couple of days. Mother denies if she observed any fever chills, or symptoms systemic infections and lethargy. Not clear when she drinks the last alcohol drink.            Hospital Course:  Patient admitted to hospital medicine and treated for hepatic encephalopathy - per notes, pt improved on PO lactulose and evaluated by liver transplant and psychiatry. ICU consulted on 1/21/17 after pt had 1 episode of what was thought to be " hematemesis but later upon inspection was a bleed from mouth trauma secondary to a seizure.     01/22/17 - Patient was scheduled to get ativan, but seized before administration of ativan this morning. The patient was treated with ativan 2 mg IV once and started on 1 mg q6 hours. 1 unit of FFP ordered for her oral pharyngeal bleed. Mouth packed. The patient is currently being considered for hepatic transplantation. If she does not elect for this then palliation should be considered.        Interval History:     No acute events happened, she is currently having better communication and more talkative more than previous days. Ortinted.      Review of Systems   Constitutional: Positive for fatigue. Negative for activity change, appetite change, chills, diaphoresis and fever.   HENT: Negative for congestion and dental problem.    Eyes: Negative for photophobia, pain, discharge and redness.   Respiratory: Negative for apnea, cough, choking and shortness of breath.    Cardiovascular: Negative for chest pain, palpitations and leg swelling.   Gastrointestinal: Negative for abdominal distention, abdominal pain, anal bleeding and blood in stool.   Genitourinary: Negative for difficulty urinating, dysuria, flank pain and frequency.   Musculoskeletal: Negative for back pain and gait problem.   Skin: Positive for color change and pallor.   Neurological: Positive for seizures. Negative for dizziness, facial asymmetry and headaches.   Hematological: Does not bruise/bleed easily.     Objective:     Vital Signs (Most Recent):  Temp: 98.6 °F (37 °C) (01/23/17 1630)  Pulse: 97 (01/23/17 1630)  Resp: 16 (01/23/17 1630)  BP: 120/63 (01/23/17 1630)  SpO2: 96 % (01/23/17 1630) Vital Signs (24h Range):  Temp:  [97.9 °F (36.6 °C)-100 °F (37.8 °C)] 98.6 °F (37 °C)  Pulse:  [77-97] 97  Resp:  [14-18] 16  SpO2:  [93 %-96 %] 96 %  BP: (118-144)/(59-76) 120/63     Weight: 49.4 kg (109 lb)  Body mass index is 19.31 kg/(m^2).  No intake or output  data in the 24 hours ending 01/23/17 7471   Physical Exam   Constitutional: She appears well-developed. No distress.   HENT:   Sclerae of face and conjunctivae    Eyes: Right eye exhibits no discharge. Left eye exhibits no discharge. Scleral icterus is present.   Neck: Normal range of motion. Neck supple. No JVD present. No thyromegaly present.   Cardiovascular: Normal rate and regular rhythm.  Exam reveals no friction rub.    No murmur heard.  Pulmonary/Chest: Effort normal and breath sounds normal. No respiratory distress. She has no wheezes. She has no rales.   Abdominal: Soft. Bowel sounds are normal. She exhibits no distension. There is no tenderness.   Negative for hepatic asterixis.    Neurological: No cranial nerve deficit. Coordination normal.   conscious but only oriented to person and place.    Skin: She is not diaphoretic.   Vitals reviewed.        Recent Labs  Lab 01/21/17  0501 01/22/17  0622 01/23/17  0555     141 136 136   K 3.5  3.5 4.1 4.0     109 106 104   CO2 16*  16* 23 25   BUN 7  7 7 8   CREATININE 0.7  0.7 0.6 0.6   CALCIUM 9.2  9.2 8.4* 8.4*   MG 1.6 1.3* 1.4*        Recent Labs  Lab 01/21/17  0501 01/22/17  0622 01/23/17  0555   ALBUMIN 3.3*  3.3* 2.9* 2.8*   PROT 7.2  7.2 6.3 6.2   BILITOT 7.5*  7.5* 10.0* 9.4*   ALKPHOS 235*  235* 138* 121   ALT 51*  51* 40 35   AST 94*  94* 72* 65*         Assessment/Plan:      * Hepatic encephalopathy  - Non compliance history of her lactulose.   - Ammonia at presentation was 50   - complicated by her administration of Klonopin.   - Delirium precaution   Daytime: awake, up in chair as tolerated, tv on, window shades up, frequent reorientation.  Nighttime: in bed, minimize interruptions, tv off, window shades down.  - lactulose and rifxamin has improved her mental status.   - Hepatitis panel and HIV negative  - PETH negative   - Hepatology will decide for transplant work up with patient and her sister and they tried to reach her  sister and communicate regarding liver transplant workup. If failed to reach sister will communicate mother. If no transplant workup will be initiated, palliative consult is imperative.   - Started on long term vitamin treatment.  - Hepatology recommended to start zinc sulfate capsule 220 mg  - Psychiatry recommends Risperdal 0.5 mg QHS.  - Also recommend obtaining of thiamin level and possible neuro consult for confabulation.    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      Thrombocytopenia  - Could be secondary to her chronic liver disease and hypersplenism  - off of dvt ppx as the patient has a oral pharyngeal bleed.     Chronic liver failure  - Review principal problem for more elaboration     Bipolar disorder in remission  - Hospitalized for crystal and psychosis x 2; also has had depression (total length 4) (duration 3 wks and 1.5 weeks with the others)  - On lithium daily and follow with her psychiatric  - Lithium level is normal  Resumed Lithium   - Will consider risperdal once patient's mental status comes back to baseline.     Anemia    Recent Labs  Lab 01/22/17  2321 01/23/17  0746 01/23/17  1606   HGB 9.4* 10.4* 10.1*   HCT 26.9* 30.4* 30.1*   - secondary to chronic liver disease and potentially nutritional deficiencies       Alcohol dependence in remission  - No clear history of alcohol use  - Alcohol level is < 10  - Started on thiamine daily and will continue after discharge    Malnutrition  - Will consult dietary  for their input.     History of seizure        Benzodiazepine withdrawal with complication  Giving now 1 mg ativan q 6 hours  Seized before getting ativan dosage this morning  PRN's in place.       Tongue laceration  Secondary to seizures.   Ordered FFP  IV Vitamin K given.       Seizure in response to acute event, resolved as of 1/23/2017  See problem with benzo withdrawal.       VTE Risk Mitigation         Ordered     Medium Risk of VTE  Once      01/15/17 1926          Adin Sawyer MD  Department of Hospital Medicine   [facility]

## 2017-01-23 NOTE — PT/OT/SLP PROGRESS
Physical Therapy      Marely Hamilton  MRN: [id number]    Patient not seen today secondary to Other (Comment). MD discontinued orders. Attempted to get in touch with primary team on orders in AM and PM; new treatment orders in PM. Will follow-up when next scheduled.    ROMY MONTAGUE, PT   1/23/2017

## 2017-01-23 NOTE — PLAN OF CARE
CM called pt sister, Zaria Hamliton, leaving voicemail to return call in regards to pt having liver workup for possible Liver Transplant and thoughts from the family per Md request.

## 2017-01-23 NOTE — NURSING
Plan of care reviewed with patient, questions/concerns addressed. Pt oriented to self and place, verbalized understanding of plan. V/S stable. Medicated per MAR. Pt free of fall/injury, Bed in lowest position with wheels locked, side rails up x2, belongings within reach, avasys monitor at bedside. Will continue to monitor

## 2017-01-23 NOTE — PT/OT/SLP EVAL
Speech Language Pathology  Evaluation    Marely Hamilton   MRN: [id number]   Admitting Diagnosis: Hepatic encephalopathy    Diet recommendations: Solid Diet Level: Regular  Liquid Diet Level: Thin Feed only when awake/alert, HOB to 90 degrees, Small bites/sips, Alternating bites/sips and 1 bite/sip at a time  Pt able to take meds whole when awake/alert, if pt lethargic or taking large meds crush meds in puree  .   SLP Treatment Date: 17  Speech Start Time: 1350     Speech Stop Time: 1401     Speech Total (min): 11 min       TREATMENT BILLABLE MINUTES:  Eval Swallow and Oral Function 11    Diagnosis: Hepatic encephalopathy      Past Medical History   Diagnosis Date    Alcohol abuse, in remission 6/15/2015     14.5 weeks ago; AA weekly    Anemia     Anxiety 6/15/2015    Behavioral problem     Bipolar disorder     Bipolar disorder in remission 6/15/2015    Cirrhosis, Laennec's 6/15/2015    Depression     Encounter for blood transfusion     Epistaxis 6/15/2015    Fatigue     Glaucoma     Hematuria     Hepatic encephalopathy 6/15/2015    Hepatic enlargement     History of psychiatric care     History of psychiatric hospitalization     History of seizure 6/15/2015     1    hx of intentional Tylenol overdose 6/15/2015     2005- situational and hx of bipolar    Jeana     Osteoarthritis     Other ascites 6/15/2015    Psychiatric problem     Renal disorder     Seizures     Self-harming behavior     Suicide attempt     Therapy     Thrombocytopenia 6/15/2015     Past Surgical History   Procedure Laterality Date    Esophagogastroduodenoscopy      Cosmetic surgery         Has the patient been evaluated by SLP for swallowing? : Yes  Keep patient NPO?: No   General Precautions: Standard, fall          Prior diet: Regular/thin.    Subjective:  Awake/alert, confused    Pain Ratin/10  Pain Rating Post-Intervention: 0/10    Objective:        Oral Musculature Evaluation  Oral Musculature:  WFL  Dentition: present and adequate  Mandibular Strength and Mobility: WFL  Oral Labial Strength and Mobility: WFL  Lingual Strength and Mobility: WFL  Voice Prior to PO Intake: clear     Bedside Swallow Eval:  Consistencies Assessed: Thin liquids x12 straw, Puree x4 and Solids x2  Oral Phase: WFL  Pharyngeal Phase: no overt clinical  signs/symptoms of aspiration and no overt clinical signs/symptoms of pharyngeal dysphagia with single and consecutive sips without medication, puree and cracker trials. Coughing when trying to take large chalky pill with consecutive sips of thin liquids. Pill had to be cut in half and placed in puree.       Assessment:  Marely Hamilton is a 51 y.o. female with a medical diagnosis of Hepatic encephalopathy and presents with Oral and pharyngeal phases of swallow deemed WFL.          Discharge recommendations:   TBD    Goals:   SLP Goals        Problem: SLP Goal    Goal Priority Disciplines Outcome   SLP Goal     SLP    Description:  Speech Language Pathology Goals  Goals expected to be met by 1/23    1. Pt will tolerate regular diet/thin liquids without overt s/s of aspiration GOAL MET                    Plan:   Plan of Care reviewed with: patient  SLP Follow-up?: No              Mayra Olivier CCC-SLP   Speech Language Pathologist  Pager [phone]  01/23/2017

## 2017-01-23 NOTE — PROGRESS NOTES
"1/23/2017 10:25 AM   Marely Hamilton   1965   [id number]        Psychiatry Progress Note     SUBJECTIVE:   Per student eval:   Patient seen and examined this AM. Pt was lying in bed awake this morning. She is more alert this morning. She still appears jaundiced. She reports her mood as "much better," but she still c/o 8/10 pain. She seems almost child like at times this morning. She says that she slept well overnight and that the sleep is important for her to regain her energy. She also stated that being able to have her "juice and popsicles" would help her to regain her energy. She says that she feels safe in the hospital. She stated that safety remains her main concern, especially in regards to staying in bed and asking for help getting out of bed and using the bathroom. She states that her "brock tees" and "doodles" are private, and she is concerned about having female staff around to assist her in using the bathroom. She is also concerned with her mother being home alone, specifically that her thermostat may be up too high. She currently denies any SI/HI/AVH, but she did say that she and her mother had slept on top of each other on the couch last night. She also continues to incorporate items that she seems on the new or reads off her board into her speech. She was able to spell "world" but was unable to spell it backwards. She failed "SAVEAHAART". She is oriented to person (Marely Hamilton) and place ([facility] in [place], [place]). She is able to state the date; however, she does so after reading it off her board.     Patient evaluated later this am. She is able to states day of week, month and year but not date. She failed SAVEAHAART and when asked to spell "GLOBE" she responded "World? Backwards? Let do that one because its faster and I know the med students are a little confused..." When she attempted to spell WORLD backwards, she closed her eyes and said "av... Chart... Finger." She exhibits " "disorganized thought process, loose associations and is possibly confabulating. She asked what happened to her mother and pointed to the couch, she then suggested the blanket either be placed in "soiled linens" or thrown on top of her before she does "the procedure." When asked what procedure she was referring to, pt responded "the mini mental status procedure."    On rounds, pt continued to display disorganized thought process. She is able to name pen and watch correctly, but when asked what they were used for, she said "you erase" with a pen and became distracted and did not answer the use of a watch. Patient's mother was at bedside and she reports concern that she is becoming more confused and is getting worse.   I attempted to test for nystagmus but patient had difficulty complying with my directions, and wouldn't fully follow my finger w her eyes. Some saccades were noted. Unable to test gait (bed alarm).        Current Medications:   Scheduled Meds:    clotrimazole  10 mg Oral TID    folic acid  1,000 mcg Oral Daily    lactulose  15 g Oral TID    lorazepam  1 mg Intravenous Q6H    miconazole   Topical (Top) BID    phytonadione ((AQUA-MEPHYTON) IVPB  10 mg Intravenous Daily    rifAXIMimin  550 mg Oral BID    thiamine  100 mg Oral Daily      PRN Meds: dextrose 50%, dextrose 50%, glucagon (human recombinant), glucose, glucose, lorazepam, ondansetron, ramelteon   Psychotherapeutics     Start     Stop Route Frequency Ordered    01/15/17 2050  olanzapine injection 2.5 mg      01/15 2359 IM Once as needed 01/15/17 1950    01/18/17 1609  ramelteon tablet 8 mg      -- Oral Nightly PRN 01/18/17 1509    01/22/17 1800  lorazepam injection 1 mg      -- IV Every 6 hours 01/22/17 1124    01/22/17 1224  lorazepam injection 2 mg      -- IV Every 10 min PRN 01/22/17 1125          Allergies:   Review of patient's allergies indicates:   Allergen Reactions    Sulfa (sulfonamide antibiotics) Rash        OBJECTIVE:   Vitals "   Vitals:    01/23/17 0800   BP: 136/69   Pulse: 86   Resp: 14   Temp: 98.3 °F (36.8 °C)        Labs/Imaging/Studies:   Recent Results (from the past 36 hour(s))   Comprehensive Metabolic Panel (CMP)    Collection Time: 01/22/17  6:22 AM   Result Value Ref Range    Sodium 136 136 - 145 mmol/L    Potassium 4.1 3.5 - 5.1 mmol/L    Chloride 106 95 - 110 mmol/L    CO2 23 23 - 29 mmol/L    Glucose 121 (H) 70 - 110 mg/dL    BUN, Bld 7 6 - 20 mg/dL    Creatinine 0.6 0.5 - 1.4 mg/dL    Calcium 8.4 (L) 8.7 - 10.5 mg/dL    Total Protein 6.3 6.0 - 8.4 g/dL    Albumin 2.9 (L) 3.5 - 5.2 g/dL    Total Bilirubin 10.0 (H) 0.1 - 1.0 mg/dL    Alkaline Phosphatase 138 (H) 55 - 135 U/L    AST 72 (H) 10 - 40 U/L    ALT 40 10 - 44 U/L    Anion Gap 7 (L) 8 - 16 mmol/L    eGFR if African American >60.0 >60 mL/min/1.73 m^2    eGFR if non African American >60.0 >60 mL/min/1.73 m^2   Magnesium    Collection Time: 01/22/17  6:22 AM   Result Value Ref Range    Magnesium 1.3 (L) 1.6 - 2.6 mg/dL   PT/INR    Collection Time: 01/22/17  6:22 AM   Result Value Ref Range    Prothrombin Time 17.1 (H) 9.0 - 12.5 sec    INR 1.7 (H) 0.8 - 1.2   CBC auto differential    Collection Time: 01/22/17  8:00 AM   Result Value Ref Range    WBC 8.02 3.90 - 12.70 K/uL    RBC 3.01 (L) 4.00 - 5.40 M/uL    Hemoglobin 9.9 (L) 12.0 - 16.0 g/dL    Hematocrit 29.0 (L) 37.0 - 48.5 %    MCV 96 82 - 98 fL    MCH 32.9 (H) 27.0 - 31.0 pg    MCHC 34.1 32.0 - 36.0 %    RDW 16.8 (H) 11.5 - 14.5 %    Platelets 58 (L) 150 - 350 K/uL    MPV 9.7 9.2 - 12.9 fL    Gran # 5.4 1.8 - 7.7 K/uL    Lymph # 1.7 1.0 - 4.8 K/uL    Mono # 0.7 0.3 - 1.0 K/uL    Eos # 0.2 0.0 - 0.5 K/uL    Baso # 0.03 0.00 - 0.20 K/uL    Gran% 67.1 38.0 - 73.0 %    Lymph% 21.1 18.0 - 48.0 %    Mono% 8.6 4.0 - 15.0 %    Eosinophil% 2.4 0.0 - 8.0 %    Basophil% 0.4 0.0 - 1.9 %    Differential Method Automated    POCT glucose    Collection Time: 01/22/17 11:16 AM   Result Value Ref Range    POCT Glucose 164 (H) 70 -  110 mg/dL   ISTAT PROCEDURE    Collection Time: 01/22/17 11:17 AM   Result Value Ref Range    POC PH 7.208 (LL) 7.35 - 7.45    POC PCO2 38.2 35 - 45 mmHg    POC PO2 94 80 - 100 mmHg    POC HCO3 15.2 (L) 24 - 28 mmol/L    POC BE -13 -2 to 2 mmol/L    POC SATURATED O2 95 95 - 100 %    POC TCO2 16 (L) 23 - 27 mmol/L    Sample ARTERIAL     Site RR     Allens Test Pass     DelSys Nasal Can     Mode SPONT     Flow 4     Sp02 95    CBC auto differential    Collection Time: 01/22/17  4:29 PM   Result Value Ref Range    WBC 7.14 3.90 - 12.70 K/uL    RBC 3.05 (L) 4.00 - 5.40 M/uL    Hemoglobin 9.9 (L) 12.0 - 16.0 g/dL    Hematocrit 28.5 (L) 37.0 - 48.5 %    MCV 93 82 - 98 fL    MCH 32.5 (H) 27.0 - 31.0 pg    MCHC 34.7 32.0 - 36.0 %    RDW 16.7 (H) 11.5 - 14.5 %    Platelets 55 (L) 150 - 350 K/uL    MPV 9.7 9.2 - 12.9 fL    Gran # 5.2 1.8 - 7.7 K/uL    Lymph # 1.1 1.0 - 4.8 K/uL    Mono # 0.7 0.3 - 1.0 K/uL    Eos # 0.1 0.0 - 0.5 K/uL    Baso # 0.03 0.00 - 0.20 K/uL    Gran% 72.7 38.0 - 73.0 %    Lymph% 15.4 (L) 18.0 - 48.0 %    Mono% 9.5 4.0 - 15.0 %    Eosinophil% 1.4 0.0 - 8.0 %    Basophil% 0.4 0.0 - 1.9 %    Differential Method Automated    CBC auto differential    Collection Time: 01/22/17 11:21 PM   Result Value Ref Range    WBC 7.92 3.90 - 12.70 K/uL    RBC 2.88 (L) 4.00 - 5.40 M/uL    Hemoglobin 9.4 (L) 12.0 - 16.0 g/dL    Hematocrit 26.9 (L) 37.0 - 48.5 %    MCV 93 82 - 98 fL    MCH 32.6 (H) 27.0 - 31.0 pg    MCHC 34.9 32.0 - 36.0 %    RDW 16.4 (H) 11.5 - 14.5 %    Platelets 53 (L) 150 - 350 K/uL    MPV 9.4 9.2 - 12.9 fL    Gran # 4.8 1.8 - 7.7 K/uL    Lymph # 2.0 1.0 - 4.8 K/uL    Mono # 0.8 0.3 - 1.0 K/uL    Eos # 0.2 0.0 - 0.5 K/uL    Baso # 0.02 0.00 - 0.20 K/uL    Gran% 60.8 38.0 - 73.0 %    Lymph% 25.4 18.0 - 48.0 %    Mono% 10.6 4.0 - 15.0 %    Eosinophil% 2.4 0.0 - 8.0 %    Basophil% 0.3 0.0 - 1.9 %    Differential Method Automated    Comprehensive Metabolic Panel (CMP)    Collection Time: 01/23/17  5:55 AM    Result Value Ref Range    Sodium 136 136 - 145 mmol/L    Potassium 4.0 3.5 - 5.1 mmol/L    Chloride 104 95 - 110 mmol/L    CO2 25 23 - 29 mmol/L    Glucose 111 (H) 70 - 110 mg/dL    BUN, Bld 8 6 - 20 mg/dL    Creatinine 0.6 0.5 - 1.4 mg/dL    Calcium 8.4 (L) 8.7 - 10.5 mg/dL    Total Protein 6.2 6.0 - 8.4 g/dL    Albumin 2.8 (L) 3.5 - 5.2 g/dL    Total Bilirubin 9.4 (H) 0.1 - 1.0 mg/dL    Alkaline Phosphatase 121 55 - 135 U/L    AST 65 (H) 10 - 40 U/L    ALT 35 10 - 44 U/L    Anion Gap 7 (L) 8 - 16 mmol/L    eGFR if African American >60.0 >60 mL/min/1.73 m^2    eGFR if non African American >60.0 >60 mL/min/1.73 m^2   Magnesium    Collection Time: 01/23/17  5:55 AM   Result Value Ref Range    Magnesium 1.4 (L) 1.6 - 2.6 mg/dL   PT/INR    Collection Time: 01/23/17  5:55 AM   Result Value Ref Range    Prothrombin Time 15.3 (H) 9.0 - 12.5 sec    INR 1.5 (H) 0.8 - 1.2   CBC auto differential    Collection Time: 01/23/17  7:46 AM   Result Value Ref Range    WBC 7.80 3.90 - 12.70 K/uL    RBC 3.17 (L) 4.00 - 5.40 M/uL    Hemoglobin 10.4 (L) 12.0 - 16.0 g/dL    Hematocrit 30.4 (L) 37.0 - 48.5 %    MCV 96 82 - 98 fL    MCH 32.8 (H) 27.0 - 31.0 pg    MCHC 34.2 32.0 - 36.0 %    RDW 16.3 (H) 11.5 - 14.5 %    Platelets 63 (L) 150 - 350 K/uL    MPV 9.7 9.2 - 12.9 fL    Gran # 5.5 1.8 - 7.7 K/uL    Lymph # 1.5 1.0 - 4.8 K/uL    Mono # 0.7 0.3 - 1.0 K/uL    Eos # 0.1 0.0 - 0.5 K/uL    Baso # 0.03 0.00 - 0.20 K/uL    Gran% 70.3 38.0 - 73.0 %    Lymph% 19.1 18.0 - 48.0 %    Mono% 8.5 4.0 - 15.0 %    Eosinophil% 1.4 0.0 - 8.0 %    Basophil% 0.4 0.0 - 1.9 %    Differential Method Automated         Mental Status Exam:   Arousal: awake, lying in bed  Appearance: jaundiced, disheveled, excoriations around mouth  Sensorium/Orientation: oriented to person and place, possibly oriented to time (reading from board)  Grooming: poor  Behavior/Cooperation: cooperative, good eye contact, disorganized behavior  Psychomotor: no PMA/PMR  Speech:  "normal rate, volume, tone   Language: fluent English  Mood: "much better"   Affect: blunted  Thought Process: disorganized, loose, answers questions inappropriately, easily derails  Thought Content: denies SI/HI/AVH  Associations: loose associations based on objects in room, on tv, or on her board. Conversation shifts based on new observations and phrases/signs written in the room  Attention/Concentration: distractible, impaired, failed SAVEAHAART  Memory: impaired  Fund of Knowledge: impaired   Insight: impaired   Judgment: impaired    ASSESSMENT/PLAN:   Bipolar Disorder I  Alcohol Use Disorder  Benzodiazepine Use Disorder  Benzodiazepine/Alcohol Withdrawal  R/O Delirium   R/O Wernicke Encephalopathy    Recommendations:    Continue withdrawal precautions, monitor vital every 4 hours. She was prescribed klonopin 2mg po 4x day prior to entering the hospital and has h/o alcohol dependence.   - Continue scheduled Ativan taper with prn Ativan 2mg po every 4 hours prn systolic blood pressure > 160, Diastolic Blood pressure >110, heart rate > 110, tremors, diaphoresis, or other signs of withdrawal.  - Continue  Lithium 300mg po QHS, she was unable to tolerate higher doses in the past.   - EKG 1/20/17 showed improved QTc @ 490 (compared to QTc 503 on 1/15/17). Recommend scheduled Risperdal 0.5mg mtabs po QHS to regulate sleep/wake cycle and manage psychosis during delirium. This can also be used as a duel mood stabilizer. She has tolerated this medication in the past.   - Multivitamin and Folate qdaily   - Patient appears to be confabulating. Obtain thiamine level, replete thiamine via IV route, recommend Neuro consult and consider MRI brain.    Case seen and discussed w Dr. Corado.  Brannon Wiedemann, M.D.   [facility] Psychiatry PGY4  1/23/2017       STAFF NOTE:  Patient seen on rounds.  Case discussed and I agree with A & P per Dr. Wiedemann as stated above. Pt was fully awake and alert.  However, many of her responses " "to questions were inappropriate.  She exhibited a significant amount of confusion with MMSE testing.  She was easily distracted by irrelevant or innocuous stimuli.  She had difficulty comprehending and complying with most simple commands, such as "touch your left hand to your right shoulder".    Mom states her confusion appears to be worse than in previous days.      IMP:  Hepatic Encephalopathy (R/O Wernicke's)   Bipolar Disorder I, unspecified   Alcohol Use Disorder, severe  Benzodiazepine Use Disorder, unspecified   Benzodiazepine/Alcohol Withdrawal    REC:  As noted above.  IV thiamine to help with possible progression into Wernicke's encephalopathy.  Continue to check labs, including ammonia level.    I also agree with consulting Neurology for additional assistance if sensorium continues to decline.      Will f/u with you.   "

## 2017-01-23 NOTE — PLAN OF CARE
Problem: SLP Goal  Goal: SLP Goal  Speech Language Pathology Goals  Goals expected to be met by 1/23    1. Pt will tolerate regular diet/thin liquids without overt s/s of aspiration GOAL MET     Bedside swallow evaluation completed, no further ST recommended  Mayra Olivier CCC-SLP  1/23/2017

## 2017-01-23 NOTE — MEDICAL/APP STUDENT
"1/23/2017 10:25 AM   Marely Hamilton   1965   [id number]        Psychiatry Progress Note     SUBJECTIVE:     Patient seen and examined this AM. Pt was lying in bed awake this morning. She is more alert this morning. She still appears jaundiced. She reports her mood as "much better," but she still c/o 8/10 pain. She seems almost child like at times this morning. She says that she slept well overnight and that the sleep is important for her to regain her energy. She also stated that being able to have her "juice and popsicles" would help her to regain her energy. She says that she feels safe in the hospital. She stated that safety remains her main concern, especially in regards to staying in bed and asking for help getting out of bed and using the bathroom. She states that her "brock tees" and "doodles" are private, and she is concerned about having female staff around to assist her in using the bathroom. She is also concerned with her mother being home alone, specifically that her thermostat may be up too high. She currently denies any SI/HI/AVH, but she did say that she and her mother had slept on top of each other on the couch last night. She also continues to incorporate items that she seems on the new or reads off her board into her speech. She was able to spell "world" but was unable to spell it backwards. She failed "SAVEAHAART". She is oriented to person (Marely Joy) and place ([facility] in [place], [place]). She is able to state the date; however, she does so after reading it off her board. Will continue to follow.        Current Medications:   Scheduled Meds:    clotrimazole  10 mg Oral TID    folic acid  1,000 mcg Oral Daily    lactulose  15 g Oral TID    lorazepam  1 mg Intravenous Q6H    miconazole   Topical (Top) BID    phytonadione ((AQUA-MEPHYTON) IVPB  10 mg Intravenous Daily    rifAXIMimin  550 mg Oral BID    thiamine  100 mg Oral Daily      PRN Meds: dextrose 50%, dextrose " 50%, glucagon (human recombinant), glucose, glucose, lorazepam, ondansetron, ramelteon   Psychotherapeutics     Start     Stop Route Frequency Ordered    01/15/17 2050  olanzapine injection 2.5 mg      01/15 2359 IM Once as needed 01/15/17 1950    01/18/17 1609  ramelteon tablet 8 mg      -- Oral Nightly PRN 01/18/17 1509    01/22/17 1800  lorazepam injection 1 mg      -- IV Every 6 hours 01/22/17 1124    01/22/17 1224  lorazepam injection 2 mg      -- IV Every 10 min PRN 01/22/17 1125          Allergies:   Review of patient's allergies indicates:   Allergen Reactions    Sulfa (sulfonamide antibiotics) Rash        OBJECTIVE:   Vitals   Vitals:    01/23/17 0800   BP: 136/69   Pulse: 86   Resp: 14   Temp: 98.3 °F (36.8 °C)        Labs/Imaging/Studies:   Recent Results (from the past 36 hour(s))   CBC auto differential    Collection Time: 01/21/17 11:46 PM   Result Value Ref Range    WBC 12.09 3.90 - 12.70 K/uL    RBC 3.16 (L) 4.00 - 5.40 M/uL    Hemoglobin 10.5 (L) 12.0 - 16.0 g/dL    Hematocrit 30.4 (L) 37.0 - 48.5 %    MCV 96 82 - 98 fL    MCH 33.2 (H) 27.0 - 31.0 pg    MCHC 34.5 32.0 - 36.0 %    RDW 16.9 (H) 11.5 - 14.5 %    Platelets 71 (L) 150 - 350 K/uL    MPV 9.8 9.2 - 12.9 fL    Gran # 8.4 (H) 1.8 - 7.7 K/uL    Lymph # 2.3 1.0 - 4.8 K/uL    Mono # 1.1 (H) 0.3 - 1.0 K/uL    Eos # 0.2 0.0 - 0.5 K/uL    Baso # 0.05 0.00 - 0.20 K/uL    Gran% 69.4 38.0 - 73.0 %    Lymph% 18.9 18.0 - 48.0 %    Mono% 9.4 4.0 - 15.0 %    Eosinophil% 1.5 0.0 - 8.0 %    Basophil% 0.4 0.0 - 1.9 %    Differential Method Automated    Comprehensive Metabolic Panel (CMP)    Collection Time: 01/22/17  6:22 AM   Result Value Ref Range    Sodium 136 136 - 145 mmol/L    Potassium 4.1 3.5 - 5.1 mmol/L    Chloride 106 95 - 110 mmol/L    CO2 23 23 - 29 mmol/L    Glucose 121 (H) 70 - 110 mg/dL    BUN, Bld 7 6 - 20 mg/dL    Creatinine 0.6 0.5 - 1.4 mg/dL    Calcium 8.4 (L) 8.7 - 10.5 mg/dL    Total Protein 6.3 6.0 - 8.4 g/dL    Albumin 2.9 (L) 3.5 -  5.2 g/dL    Total Bilirubin 10.0 (H) 0.1 - 1.0 mg/dL    Alkaline Phosphatase 138 (H) 55 - 135 U/L    AST 72 (H) 10 - 40 U/L    ALT 40 10 - 44 U/L    Anion Gap 7 (L) 8 - 16 mmol/L    eGFR if African American >60.0 >60 mL/min/1.73 m^2    eGFR if non African American >60.0 >60 mL/min/1.73 m^2   Magnesium    Collection Time: 01/22/17  6:22 AM   Result Value Ref Range    Magnesium 1.3 (L) 1.6 - 2.6 mg/dL   PT/INR    Collection Time: 01/22/17  6:22 AM   Result Value Ref Range    Prothrombin Time 17.1 (H) 9.0 - 12.5 sec    INR 1.7 (H) 0.8 - 1.2   CBC auto differential    Collection Time: 01/22/17  8:00 AM   Result Value Ref Range    WBC 8.02 3.90 - 12.70 K/uL    RBC 3.01 (L) 4.00 - 5.40 M/uL    Hemoglobin 9.9 (L) 12.0 - 16.0 g/dL    Hematocrit 29.0 (L) 37.0 - 48.5 %    MCV 96 82 - 98 fL    MCH 32.9 (H) 27.0 - 31.0 pg    MCHC 34.1 32.0 - 36.0 %    RDW 16.8 (H) 11.5 - 14.5 %    Platelets 58 (L) 150 - 350 K/uL    MPV 9.7 9.2 - 12.9 fL    Gran # 5.4 1.8 - 7.7 K/uL    Lymph # 1.7 1.0 - 4.8 K/uL    Mono # 0.7 0.3 - 1.0 K/uL    Eos # 0.2 0.0 - 0.5 K/uL    Baso # 0.03 0.00 - 0.20 K/uL    Gran% 67.1 38.0 - 73.0 %    Lymph% 21.1 18.0 - 48.0 %    Mono% 8.6 4.0 - 15.0 %    Eosinophil% 2.4 0.0 - 8.0 %    Basophil% 0.4 0.0 - 1.9 %    Differential Method Automated    POCT glucose    Collection Time: 01/22/17 11:16 AM   Result Value Ref Range    POCT Glucose 164 (H) 70 - 110 mg/dL   ISTAT PROCEDURE    Collection Time: 01/22/17 11:17 AM   Result Value Ref Range    POC PH 7.208 (LL) 7.35 - 7.45    POC PCO2 38.2 35 - 45 mmHg    POC PO2 94 80 - 100 mmHg    POC HCO3 15.2 (L) 24 - 28 mmol/L    POC BE -13 -2 to 2 mmol/L    POC SATURATED O2 95 95 - 100 %    POC TCO2 16 (L) 23 - 27 mmol/L    Sample ARTERIAL     Site RR     Allens Test Pass     DelSys Nasal Can     Mode SPONT     Flow 4     Sp02 95    CBC auto differential    Collection Time: 01/22/17  4:29 PM   Result Value Ref Range    WBC 7.14 3.90 - 12.70 K/uL    RBC 3.05 (L) 4.00 - 5.40 M/uL     Hemoglobin 9.9 (L) 12.0 - 16.0 g/dL    Hematocrit 28.5 (L) 37.0 - 48.5 %    MCV 93 82 - 98 fL    MCH 32.5 (H) 27.0 - 31.0 pg    MCHC 34.7 32.0 - 36.0 %    RDW 16.7 (H) 11.5 - 14.5 %    Platelets 55 (L) 150 - 350 K/uL    MPV 9.7 9.2 - 12.9 fL    Gran # 5.2 1.8 - 7.7 K/uL    Lymph # 1.1 1.0 - 4.8 K/uL    Mono # 0.7 0.3 - 1.0 K/uL    Eos # 0.1 0.0 - 0.5 K/uL    Baso # 0.03 0.00 - 0.20 K/uL    Gran% 72.7 38.0 - 73.0 %    Lymph% 15.4 (L) 18.0 - 48.0 %    Mono% 9.5 4.0 - 15.0 %    Eosinophil% 1.4 0.0 - 8.0 %    Basophil% 0.4 0.0 - 1.9 %    Differential Method Automated    CBC auto differential    Collection Time: 01/22/17 11:21 PM   Result Value Ref Range    WBC 7.92 3.90 - 12.70 K/uL    RBC 2.88 (L) 4.00 - 5.40 M/uL    Hemoglobin 9.4 (L) 12.0 - 16.0 g/dL    Hematocrit 26.9 (L) 37.0 - 48.5 %    MCV 93 82 - 98 fL    MCH 32.6 (H) 27.0 - 31.0 pg    MCHC 34.9 32.0 - 36.0 %    RDW 16.4 (H) 11.5 - 14.5 %    Platelets 53 (L) 150 - 350 K/uL    MPV 9.4 9.2 - 12.9 fL    Gran # 4.8 1.8 - 7.7 K/uL    Lymph # 2.0 1.0 - 4.8 K/uL    Mono # 0.8 0.3 - 1.0 K/uL    Eos # 0.2 0.0 - 0.5 K/uL    Baso # 0.02 0.00 - 0.20 K/uL    Gran% 60.8 38.0 - 73.0 %    Lymph% 25.4 18.0 - 48.0 %    Mono% 10.6 4.0 - 15.0 %    Eosinophil% 2.4 0.0 - 8.0 %    Basophil% 0.3 0.0 - 1.9 %    Differential Method Automated    Comprehensive Metabolic Panel (CMP)    Collection Time: 01/23/17  5:55 AM   Result Value Ref Range    Sodium 136 136 - 145 mmol/L    Potassium 4.0 3.5 - 5.1 mmol/L    Chloride 104 95 - 110 mmol/L    CO2 25 23 - 29 mmol/L    Glucose 111 (H) 70 - 110 mg/dL    BUN, Bld 8 6 - 20 mg/dL    Creatinine 0.6 0.5 - 1.4 mg/dL    Calcium 8.4 (L) 8.7 - 10.5 mg/dL    Total Protein 6.2 6.0 - 8.4 g/dL    Albumin 2.8 (L) 3.5 - 5.2 g/dL    Total Bilirubin 9.4 (H) 0.1 - 1.0 mg/dL    Alkaline Phosphatase 121 55 - 135 U/L    AST 65 (H) 10 - 40 U/L    ALT 35 10 - 44 U/L    Anion Gap 7 (L) 8 - 16 mmol/L    eGFR if African American >60.0 >60 mL/min/1.73 m^2    eGFR if  "non African American >60.0 >60 mL/min/1.73 m^2   Magnesium    Collection Time: 01/23/17  5:55 AM   Result Value Ref Range    Magnesium 1.4 (L) 1.6 - 2.6 mg/dL   PT/INR    Collection Time: 01/23/17  5:55 AM   Result Value Ref Range    Prothrombin Time 15.3 (H) 9.0 - 12.5 sec    INR 1.5 (H) 0.8 - 1.2   CBC auto differential    Collection Time: 01/23/17  7:46 AM   Result Value Ref Range    WBC 7.80 3.90 - 12.70 K/uL    RBC 3.17 (L) 4.00 - 5.40 M/uL    Hemoglobin 10.4 (L) 12.0 - 16.0 g/dL    Hematocrit 30.4 (L) 37.0 - 48.5 %    MCV 96 82 - 98 fL    MCH 32.8 (H) 27.0 - 31.0 pg    MCHC 34.2 32.0 - 36.0 %    RDW 16.3 (H) 11.5 - 14.5 %    Platelets 63 (L) 150 - 350 K/uL    MPV 9.7 9.2 - 12.9 fL    Gran # 5.5 1.8 - 7.7 K/uL    Lymph # 1.5 1.0 - 4.8 K/uL    Mono # 0.7 0.3 - 1.0 K/uL    Eos # 0.1 0.0 - 0.5 K/uL    Baso # 0.03 0.00 - 0.20 K/uL    Gran% 70.3 38.0 - 73.0 %    Lymph% 19.1 18.0 - 48.0 %    Mono% 8.5 4.0 - 15.0 %    Eosinophil% 1.4 0.0 - 8.0 %    Basophil% 0.4 0.0 - 1.9 %    Differential Method Automated         Mental Status Exam:   Arousal: awake, lying in bed  Appearance: jaundiced, disheveled  Sensorium/Orientation: oriented to person and place, possibly oriented to time (reading from board)  Grooming: poor  Behavior/Cooperation: cooperative   Psychomotor: no PMA/PMR  Speech: normal rate, volume, tone   Language: fluent English  Mood: "much better"   Affect: blunted, child like at times  Thought Process: impaired   Thought Content: denies SI/HI/AVH  Associations: loose associations based on objects in room, on tv, or on her board. Conversation shifts based on new observations.   Attention/Concentration: distractible  Memory: impaired  Fund of Knowledge: impaired   Insight: impaired   Judgment: impaired    ASSESSMENT/PLAN:     Bipolar Disorder I  Alcohol Use Disorder    Recommendations:   - Pt does not meet criteria for involuntary psychiatric hospitalization; she is not suicidal, homicidal or gravely disabled. " The described symptoms she experienced prior to being hospitalized are likely due alcohol/benzo withdrawal rather than crystal as she was trying to cut back on etoh intake.  - She was prescribed klonopin 2mg po 4x day prior to entering the hospital. She is at risk of benzo withdrawal and has had episodes of tachycardia;   - Continue withdrawal precautions, monitor vital every 4 hours.  - Recommend continue scheduled Ativan 1mg Q12H Recommend Ativan 2mg po every 4 hours prn systolic blood pressure > 160, Diastolic Blood pressure >110, heart rate > 110, tremors, diaphoresis, or other signs of withdrawal.  - Recommend Lithium 300mg po QHS, she was unable to tolerate higher doses in the past.   - Patients EKG displayed QTc prolongation on 1/15/17 @ 503. Would recheck EKG, and if it is no longer prolonged (<460), consider risperdal 0.5mg po QHS to regulate sleep/wake cycle during delirium due to hepatic encephalopathy. This can also be used as a duel mood stabilizer. She has tolerated this medication in the past.   - Multivitamin, Thiamine, and Folate geovanny Maya, L3  [facility] [employer] [employer]  [facility]

## 2017-01-24 LAB
ALBUMIN SERPL BCP-MCNC: 3 G/DL
ALP SERPL-CCNC: 166 U/L
ALT SERPL W/O P-5'-P-CCNC: 39 U/L
ANION GAP SERPL CALC-SCNC: 6 MMOL/L
AST SERPL-CCNC: 73 U/L
BASOPHILS # BLD AUTO: 0.01 K/UL
BASOPHILS # BLD AUTO: 0.02 K/UL
BASOPHILS NFR BLD: 0.2 %
BASOPHILS NFR BLD: 0.3 %
BILIRUB SERPL-MCNC: 6.6 MG/DL
BUN SERPL-MCNC: 6 MG/DL
CALCIUM SERPL-MCNC: 8.4 MG/DL
CHLORIDE SERPL-SCNC: 106 MMOL/L
CO2 SERPL-SCNC: 25 MMOL/L
CREAT SERPL-MCNC: 0.6 MG/DL
DIFFERENTIAL METHOD: ABNORMAL
DIFFERENTIAL METHOD: ABNORMAL
EOSINOPHIL # BLD AUTO: 0.1 K/UL
EOSINOPHIL # BLD AUTO: 0.2 K/UL
EOSINOPHIL NFR BLD: 2.6 %
EOSINOPHIL NFR BLD: 2.7 %
ERYTHROCYTE [DISTWIDTH] IN BLOOD BY AUTOMATED COUNT: 16.1 %
ERYTHROCYTE [DISTWIDTH] IN BLOOD BY AUTOMATED COUNT: 16.3 %
EST. GFR  (AFRICAN AMERICAN): >60 ML/MIN/1.73 M^2
EST. GFR  (NON AFRICAN AMERICAN): >60 ML/MIN/1.73 M^2
GLUCOSE SERPL-MCNC: 123 MG/DL
HCT VFR BLD AUTO: 28 %
HCT VFR BLD AUTO: 28.3 %
HGB BLD-MCNC: 9.5 G/DL
HGB BLD-MCNC: 9.6 G/DL
INR PPP: 1.5
LYMPHOCYTES # BLD AUTO: 1 K/UL
LYMPHOCYTES # BLD AUTO: 1.4 K/UL
LYMPHOCYTES NFR BLD: 21 %
LYMPHOCYTES NFR BLD: 24.5 %
MAGNESIUM SERPL-MCNC: 2 MG/DL
MCH RBC QN AUTO: 32.5 PG
MCH RBC QN AUTO: 32.9 PG
MCHC RBC AUTO-ENTMCNC: 33.6 %
MCHC RBC AUTO-ENTMCNC: 34.3 %
MCV RBC AUTO: 96 FL
MCV RBC AUTO: 97 FL
MONOCYTES # BLD AUTO: 0.4 K/UL
MONOCYTES # BLD AUTO: 0.4 K/UL
MONOCYTES NFR BLD: 7.3 %
MONOCYTES NFR BLD: 8.4 %
NEUTROPHILS # BLD AUTO: 3.3 K/UL
NEUTROPHILS # BLD AUTO: 3.7 K/UL
NEUTROPHILS NFR BLD: 65.1 %
NEUTROPHILS NFR BLD: 67.5 %
PLATELET # BLD AUTO: 55 K/UL
PLATELET # BLD AUTO: 63 K/UL
PMV BLD AUTO: 9.6 FL
PMV BLD AUTO: 9.7 FL
POTASSIUM SERPL-SCNC: 3.8 MMOL/L
PROT SERPL-MCNC: 6.4 G/DL
PROTHROMBIN TIME: 15 SEC
RBC # BLD AUTO: 2.92 M/UL
RBC # BLD AUTO: 2.92 M/UL
SODIUM SERPL-SCNC: 137 MMOL/L
WBC # BLD AUTO: 4.86 K/UL
WBC # BLD AUTO: 5.75 K/UL

## 2017-01-24 PROCEDURE — 36415 COLL VENOUS BLD VENIPUNCTURE: CPT

## 2017-01-24 PROCEDURE — 25000003 PHARM REV CODE 250: Performed by: HOSPITALIST

## 2017-01-24 PROCEDURE — 80053 COMPREHEN METABOLIC PANEL: CPT

## 2017-01-24 PROCEDURE — 97530 THERAPEUTIC ACTIVITIES: CPT

## 2017-01-24 PROCEDURE — 93010 ELECTROCARDIOGRAM REPORT: CPT | Mod: ,,, | Performed by: INTERNAL MEDICINE

## 2017-01-24 PROCEDURE — 25000003 PHARM REV CODE 250: Performed by: STUDENT IN AN ORGANIZED HEALTH CARE EDUCATION/TRAINING PROGRAM

## 2017-01-24 PROCEDURE — 84425 ASSAY OF VITAMIN B-1: CPT

## 2017-01-24 PROCEDURE — 85025 COMPLETE CBC W/AUTO DIFF WBC: CPT | Mod: 91

## 2017-01-24 PROCEDURE — 93005 ELECTROCARDIOGRAM TRACING: CPT

## 2017-01-24 PROCEDURE — 85610 PROTHROMBIN TIME: CPT

## 2017-01-24 PROCEDURE — 25000003 PHARM REV CODE 250: Performed by: INTERNAL MEDICINE

## 2017-01-24 PROCEDURE — 11000001 HC ACUTE MED/SURG PRIVATE ROOM

## 2017-01-24 PROCEDURE — 99238 HOSP IP/OBS DSCHRG MGMT 30/<: CPT | Mod: GC,,, | Performed by: HOSPITALIST

## 2017-01-24 PROCEDURE — 83735 ASSAY OF MAGNESIUM: CPT

## 2017-01-24 PROCEDURE — 97116 GAIT TRAINING THERAPY: CPT

## 2017-01-24 PROCEDURE — 99233 SBSQ HOSP IP/OBS HIGH 50: CPT | Mod: GC,,, | Performed by: PSYCHIATRY & NEUROLOGY

## 2017-01-24 PROCEDURE — 97535 SELF CARE MNGMENT TRAINING: CPT

## 2017-01-24 RX ORDER — LACTULOSE 10 G/15ML
15 SOLUTION ORAL 3 TIMES DAILY
Qty: 675 ML | Refills: 0
Start: 2017-01-24 | End: 2017-02-03

## 2017-01-24 RX ORDER — LANOLIN ALCOHOL/MO/W.PET/CERES
100 CREAM (GRAM) TOPICAL DAILY
Status: ON HOLD | COMMUNITY
Start: 2017-01-24 | End: 2020-06-09 | Stop reason: HOSPADM

## 2017-01-24 RX ORDER — LITHIUM CARBONATE 300 MG/1
300 CAPSULE ORAL NIGHTLY
Status: DISCONTINUED | OUTPATIENT
Start: 2017-01-24 | End: 2017-01-26 | Stop reason: HOSPADM

## 2017-01-24 RX ORDER — DOXYLAMINE SUCCINATE 25 MG
TABLET ORAL 2 TIMES DAILY
Refills: 0 | Status: ON HOLD | COMMUNITY
Start: 2017-01-24 | End: 2020-06-09 | Stop reason: HOSPADM

## 2017-01-24 RX ORDER — LORAZEPAM 1 MG/1
TABLET ORAL
Qty: 90 TABLET | Refills: 0 | Status: ON HOLD
Start: 2017-01-24 | End: 2019-10-09 | Stop reason: HOSPADM

## 2017-01-24 RX ORDER — RISPERIDONE 1 MG/ML
0.5 SOLUTION ORAL NIGHTLY
Qty: 15 ML | Refills: 0 | Status: ON HOLD
Start: 2017-01-24 | End: 2019-10-09 | Stop reason: HOSPADM

## 2017-01-24 RX ORDER — ZINC SULFATE 50(220)MG
220 CAPSULE ORAL DAILY
Status: ON HOLD | COMMUNITY
Start: 2017-01-24 | End: 2020-07-20 | Stop reason: HOSPADM

## 2017-01-24 RX ADMIN — LORAZEPAM 1 MG: 1 TABLET ORAL at 11:01

## 2017-01-24 RX ADMIN — LORAZEPAM 1 MG: 1 TABLET ORAL at 05:01

## 2017-01-24 RX ADMIN — Medication 220 MG: at 09:01

## 2017-01-24 RX ADMIN — LACTULOSE 15 G: 10 SOLUTION ORAL at 05:01

## 2017-01-24 RX ADMIN — FOLIC ACID 1000 MCG: 1 TABLET ORAL at 09:01

## 2017-01-24 RX ADMIN — RIFAXIMIN 550 MG: 550 TABLET ORAL at 08:01

## 2017-01-24 RX ADMIN — LACTULOSE 15 G: 10 SOLUTION ORAL at 01:01

## 2017-01-24 RX ADMIN — LITHIUM CARBONATE 300 MG: 300 CAPSULE, GELATIN COATED ORAL at 08:01

## 2017-01-24 RX ADMIN — LACTULOSE 15 G: 10 SOLUTION ORAL at 10:01

## 2017-01-24 RX ADMIN — Medication 100 MG: at 09:01

## 2017-01-24 RX ADMIN — LORAZEPAM 1 MG: 1 TABLET ORAL at 12:01

## 2017-01-24 RX ADMIN — MICONAZOLE NITRATE: 20 CREAM TOPICAL at 09:01

## 2017-01-24 RX ADMIN — RISPERIDONE 0.5 MG: 1 SOLUTION ORAL at 08:01

## 2017-01-24 RX ADMIN — RIFAXIMIN 550 MG: 550 TABLET ORAL at 09:01

## 2017-01-24 RX ADMIN — LORAZEPAM 1 MG: 1 TABLET ORAL at 01:01

## 2017-01-24 RX ADMIN — MICONAZOLE NITRATE: 20 CREAM TOPICAL at 08:01

## 2017-01-24 NOTE — ASSESSMENT & PLAN NOTE
Recent Labs  Lab 01/23/17  0746 01/23/17  1606 01/23/17  2344   HGB 10.4* 10.1* 9.2*   HCT 30.4* 30.1* 26.9*   - secondary to chronic liver disease and potentially nutritional deficiencies

## 2017-01-24 NOTE — ASSESSMENT & PLAN NOTE
- Giving now 1 mg ativan q 6 hours  - Seized before getting ativan dosage this morning  - PRN's in place.

## 2017-01-24 NOTE — MEDICAL/APP STUDENT
"1/24/2017 12:02 PM   Marely Hamilton   1965   [id number]        Psychiatry Progress Note     SUBJECTIVE:     Patient seen and examined this AM. She was awake and sitting up in bed. She had just finished her breakfast, which she ate nearly all of. She reported being "better" today, and said that she slept much better last night. She attributes her feeling better this morning to getting good sleep and eating a good breakfast. She is oriented to person (Marely Hamilton), place ([facility]), and time (January 24, 2017 and Tuesday). Her thought process is much improved from yesterday. She no longer appears to incorporating items from her room or television into her speech. She is able to remained focused on the interview and, for the most part, answer questions appropriately. She passed [employer]oster[employer] with no errors. When asked to spell the word "world," she asked, "Forwards or backwards?" She was able to spell "world" forwards and backwards with only 1 error (swapping "r" and "o" backwards". She is very concerned about her cleanliness and her appearance today. She expressed a desirfe to look at herself in the mirror and to have a shower and wash her hair. Otherwise, she denies any SI/HI/AVH. She denies and pain, nausea, or vomiting. She says that she feels safe and that the staff is caring for her well. We will continue to follow her.      Current Medications:   Scheduled Meds:    folic acid  1,000 mcg Oral Daily    lactulose  15 g Oral TID    lorazepam  1 mg Oral Q6H    miconazole   Topical (Top) BID    rifAXIMimin  550 mg Oral BID    risperidone 1 mg/ml  0.5 mg Oral QHS    thiamine  100 mg Oral Daily    zinc sulfate  220 mg Oral Daily      PRN Meds: dextrose 50%, dextrose 50%, glucagon (human recombinant), glucose, glucose, lorazepam, ondansetron, ramelteon   Psychotherapeutics     Start     Stop Route Frequency Ordered    01/15/17 2050  olanzapine injection 2.5 mg      01/15 2359 IM Once " as needed 01/15/17 1950    01/18/17 1609  ramelteon tablet 8 mg      -- Oral Nightly PRN 01/18/17 1509    01/22/17 1224  lorazepam injection 2 mg      -- IV Every 10 min PRN 01/22/17 1125    01/23/17 1800  lorazepam tablet 1 mg      -- Oral Every 6 hours 01/23/17 1530    01/23/17 2100  risperidone 1 mg/ml oral solution 0.5 mg      -- Oral Nightly 01/23/17 1844          Allergies:   Review of patient's allergies indicates:   Allergen Reactions    Sulfa (sulfonamide antibiotics) Rash        OBJECTIVE:   Vitals   Vitals:    01/24/17 1100   BP: (!) 116/58   Pulse: 86   Resp: 18   Temp: 98.5 °F (36.9 °C)        Labs/Imaging/Studies:   Recent Results (from the past 36 hour(s))   Comprehensive Metabolic Panel (CMP)    Collection Time: 01/23/17  5:55 AM   Result Value Ref Range    Sodium 136 136 - 145 mmol/L    Potassium 4.0 3.5 - 5.1 mmol/L    Chloride 104 95 - 110 mmol/L    CO2 25 23 - 29 mmol/L    Glucose 111 (H) 70 - 110 mg/dL    BUN, Bld 8 6 - 20 mg/dL    Creatinine 0.6 0.5 - 1.4 mg/dL    Calcium 8.4 (L) 8.7 - 10.5 mg/dL    Total Protein 6.2 6.0 - 8.4 g/dL    Albumin 2.8 (L) 3.5 - 5.2 g/dL    Total Bilirubin 9.4 (H) 0.1 - 1.0 mg/dL    Alkaline Phosphatase 121 55 - 135 U/L    AST 65 (H) 10 - 40 U/L    ALT 35 10 - 44 U/L    Anion Gap 7 (L) 8 - 16 mmol/L    eGFR if African American >60.0 >60 mL/min/1.73 m^2    eGFR if non African American >60.0 >60 mL/min/1.73 m^2   Magnesium    Collection Time: 01/23/17  5:55 AM   Result Value Ref Range    Magnesium 1.4 (L) 1.6 - 2.6 mg/dL   PT/INR    Collection Time: 01/23/17  5:55 AM   Result Value Ref Range    Prothrombin Time 15.3 (H) 9.0 - 12.5 sec    INR 1.5 (H) 0.8 - 1.2   CBC auto differential    Collection Time: 01/23/17  7:46 AM   Result Value Ref Range    WBC 7.80 3.90 - 12.70 K/uL    RBC 3.17 (L) 4.00 - 5.40 M/uL    Hemoglobin 10.4 (L) 12.0 - 16.0 g/dL    Hematocrit 30.4 (L) 37.0 - 48.5 %    MCV 96 82 - 98 fL    MCH 32.8 (H) 27.0 - 31.0 pg    MCHC 34.2 32.0 - 36.0 %    RDW  16.3 (H) 11.5 - 14.5 %    Platelets 63 (L) 150 - 350 K/uL    MPV 9.7 9.2 - 12.9 fL    Gran # 5.5 1.8 - 7.7 K/uL    Lymph # 1.5 1.0 - 4.8 K/uL    Mono # 0.7 0.3 - 1.0 K/uL    Eos # 0.1 0.0 - 0.5 K/uL    Baso # 0.03 0.00 - 0.20 K/uL    Gran% 70.3 38.0 - 73.0 %    Lymph% 19.1 18.0 - 48.0 %    Mono% 8.5 4.0 - 15.0 %    Eosinophil% 1.4 0.0 - 8.0 %    Basophil% 0.4 0.0 - 1.9 %    Differential Method Automated    CBC auto differential    Collection Time: 01/23/17  4:06 PM   Result Value Ref Range    WBC 7.08 3.90 - 12.70 K/uL    RBC 3.12 (L) 4.00 - 5.40 M/uL    Hemoglobin 10.1 (L) 12.0 - 16.0 g/dL    Hematocrit 30.1 (L) 37.0 - 48.5 %    MCV 97 82 - 98 fL    MCH 32.4 (H) 27.0 - 31.0 pg    MCHC 33.6 32.0 - 36.0 %    RDW 16.3 (H) 11.5 - 14.5 %    Platelets 60 (L) 150 - 350 K/uL    MPV 9.5 9.2 - 12.9 fL    Gran # 4.8 1.8 - 7.7 K/uL    Lymph # 1.6 1.0 - 4.8 K/uL    Mono # 0.5 0.3 - 1.0 K/uL    Eos # 0.1 0.0 - 0.5 K/uL    Baso # 0.02 0.00 - 0.20 K/uL    Gran% 67.8 38.0 - 73.0 %    Lymph% 23.2 18.0 - 48.0 %    Mono% 6.5 4.0 - 15.0 %    Eosinophil% 1.8 0.0 - 8.0 %    Basophil% 0.3 0.0 - 1.9 %    Differential Method Automated    CBC auto differential    Collection Time: 01/23/17 11:44 PM   Result Value Ref Range    WBC 6.96 3.90 - 12.70 K/uL    RBC 2.86 (L) 4.00 - 5.40 M/uL    Hemoglobin 9.2 (L) 12.0 - 16.0 g/dL    Hematocrit 26.9 (L) 37.0 - 48.5 %    MCV 94 82 - 98 fL    MCH 32.2 (H) 27.0 - 31.0 pg    MCHC 34.2 32.0 - 36.0 %    RDW 16.2 (H) 11.5 - 14.5 %    Platelets 54 (L) 150 - 350 K/uL    MPV 10.0 9.2 - 12.9 fL    Gran # 4.6 1.8 - 7.7 K/uL    Lymph # 1.7 1.0 - 4.8 K/uL    Mono # 0.5 0.3 - 1.0 K/uL    Eos # 0.2 0.0 - 0.5 K/uL    Baso # 0.03 0.00 - 0.20 K/uL    Gran% 65.9 38.0 - 73.0 %    Lymph% 24.0 18.0 - 48.0 %    Mono% 7.0 4.0 - 15.0 %    Eosinophil% 2.4 0.0 - 8.0 %    Basophil% 0.4 0.0 - 1.9 %    Differential Method Automated    Comprehensive Metabolic Panel (CMP)    Collection Time: 01/24/17  6:30 AM   Result Value Ref  Range    Sodium 137 136 - 145 mmol/L    Potassium 3.8 3.5 - 5.1 mmol/L    Chloride 106 95 - 110 mmol/L    CO2 25 23 - 29 mmol/L    Glucose 123 (H) 70 - 110 mg/dL    BUN, Bld 6 6 - 20 mg/dL    Creatinine 0.6 0.5 - 1.4 mg/dL    Calcium 8.4 (L) 8.7 - 10.5 mg/dL    Total Protein 6.4 6.0 - 8.4 g/dL    Albumin 3.0 (L) 3.5 - 5.2 g/dL    Total Bilirubin 6.6 (H) 0.1 - 1.0 mg/dL    Alkaline Phosphatase 166 (H) 55 - 135 U/L    AST 73 (H) 10 - 40 U/L    ALT 39 10 - 44 U/L    Anion Gap 6 (L) 8 - 16 mmol/L    eGFR if African American >60.0 >60 mL/min/1.73 m^2    eGFR if non African American >60.0 >60 mL/min/1.73 m^2   Magnesium    Collection Time: 01/24/17  6:30 AM   Result Value Ref Range    Magnesium 2.0 1.6 - 2.6 mg/dL   PT/INR    Collection Time: 01/24/17  6:30 AM   Result Value Ref Range    Prothrombin Time 15.0 (H) 9.0 - 12.5 sec    INR 1.5 (H) 0.8 - 1.2   CBC auto differential    Collection Time: 01/24/17  8:06 AM   Result Value Ref Range    WBC 4.86 3.90 - 12.70 K/uL    RBC 2.92 (L) 4.00 - 5.40 M/uL    Hemoglobin 9.6 (L) 12.0 - 16.0 g/dL    Hematocrit 28.0 (L) 37.0 - 48.5 %    MCV 96 82 - 98 fL    MCH 32.9 (H) 27.0 - 31.0 pg    MCHC 34.3 32.0 - 36.0 %    RDW 16.1 (H) 11.5 - 14.5 %    Platelets 55 (L) 150 - 350 K/uL    MPV 9.7 9.2 - 12.9 fL    Gran # 3.3 1.8 - 7.7 K/uL    Lymph # 1.0 1.0 - 4.8 K/uL    Mono # 0.4 0.3 - 1.0 K/uL    Eos # 0.1 0.0 - 0.5 K/uL    Baso # 0.01 0.00 - 0.20 K/uL    Gran% 67.5 38.0 - 73.0 %    Lymph% 21.0 18.0 - 48.0 %    Mono% 8.4 4.0 - 15.0 %    Eosinophil% 2.7 0.0 - 8.0 %    Basophil% 0.2 0.0 - 1.9 %    Differential Method Automated         Mental Status Exam:   Arousal: awake, sitting up in bed  Appearance: jaundiced (improved from yesterday), disheveled, rash around mouth  Sensorium/Orientation: AOx3   Grooming: poor, but she is concerned about her appearance today and wants to shower and fix her hair  Behavior/Cooperation: cooperative, good eye contact   Psychomotor: no PMA/PMR  Speech: normal  "rate, volume, tone   Language: fluent English  Mood: "better"   Affect: constricted, but improving   Thought Process: slightly disorganized at times, but improving/becoming more linear  Thought Content: denies SI/HI/AVH  Associations: no loose associations  Attention/Concentration: attentive, passed [employer]AVEA[employer]ART  Memory: impaired, but improving  Fund of Knowledge: impaired, but improving   Insight: impaired, but improving  Judgment: impaired, but improving     ASSESSMENT/PLAN:     Bipolar Disorder I  Alcohol Use Disorder  Benzodiazepine Use Disorder  Benzodiazepine/Alcohol Withdrawal  R/O Delirium   R/O Wernicke Encephalopathy     Recommendations:     - Continue withdrawal precautions, monitor vital every 4 hours. She was prescribed klonopin 2mg po 4x day prior to entering the hospital and has h/o alcohol dependence.   - Continue scheduled Ativan taper with prn Ativan 2mg po every 4 hours prn systolic blood pressure > 160, Diastolic Blood pressure >110, heart rate > 110, tremors, diaphoresis, or other signs of withdrawal.  - Continue  Lithium 300mg po QHS, she was unable to tolerate higher doses in the past.   - EKG 1/20/17 showed improved QTc @ 490 (compared to QTc 503 on 1/15/17).   - Repeat EKG to monitor QTc   Continue scheduled Risperdal 0.5mg mtabs po QHS to regulate sleep/wake cycle and manage psychosis during delirium. This can also be used as a duel mood stabilizer. She has tolerated this medication in the past.   - Multivitamin and Folate qdaily   - Patient possibly confabulating. Thiamine level results still pending, continue to replete thiamine via IV route, recommend Neuro consult and consider MRI brain.      Rangel Maya, L3  [employer] [facility] [employer]  [facility]    "

## 2017-01-24 NOTE — PLAN OF CARE
CM called pt sister, Zaria in [place], informed of transfer to [facility] still pending insurance authorization and to stay in touch with her nurse here at [facility] floor for updates of transfer.

## 2017-01-24 NOTE — PLAN OF CARE
Pt to be transferred to [facility] through transfer center.  Spoke with Alia in Transfer Center requesting lab, imaging, h&p, clinicals, progress notes to be faxed to NE[facility]incon @ [phone].

## 2017-01-24 NOTE — SUBJECTIVE & OBJECTIVE
Interval History:     Over night,     Review of Systems   Constitutional: Positive for fatigue. Negative for activity change, appetite change, chills, diaphoresis and fever.   HENT: Negative for congestion and dental problem.    Eyes: Negative for photophobia, pain, discharge and redness.   Respiratory: Negative for apnea, cough, choking and shortness of breath.    Cardiovascular: Negative for chest pain, palpitations and leg swelling.   Gastrointestinal: Negative for abdominal distention, abdominal pain, anal bleeding and blood in stool.   Genitourinary: Negative for difficulty urinating, dysuria, flank pain and frequency.   Musculoskeletal: Negative for back pain and gait problem.   Skin: Positive for color change and pallor.   Neurological: Negative for dizziness, seizures, facial asymmetry and headaches.   Hematological: Does not bruise/bleed easily.     Objective:     Vital Signs (Most Recent):  Temp: 98.3 °F (36.8 °C) (01/24/17 0515)  Pulse: 81 (01/24/17 0515)  Resp: 18 (01/24/17 0515)  BP: 109/60 (01/24/17 0515)  SpO2: 95 % (01/24/17 0515) Vital Signs (24h Range):  Temp:  [97.9 °F (36.6 °C)-98.6 °F (37 °C)] 98.3 °F (36.8 °C)  Pulse:  [81-97] 81  Resp:  [14-18] 18  SpO2:  [94 %-97 %] 95 %  BP: (109-144)/(60-76) 109/60     Weight: 49.4 kg (109 lb)  Body mass index is 19.31 kg/(m^2).    Intake/Output Summary (Last 24 hours) at 01/24/17 0707  Last data filed at 01/23/17 1800   Gross per 24 hour   Intake              820 ml   Output                0 ml   Net              820 ml      Physical Exam   Constitutional: She appears well-developed. No distress.   HENT:   Sclerae of face and conjunctivae    Eyes: Right eye exhibits no discharge. Left eye exhibits no discharge. Scleral icterus is present.   Neck: Normal range of motion. Neck supple. No JVD present. No thyromegaly present.   Cardiovascular: Normal rate and regular rhythm.  Exam reveals no friction rub.    No murmur heard.  Pulmonary/Chest: Effort normal and  breath sounds normal. No respiratory distress. She has no wheezes. She has no rales.   Abdominal: Soft. Bowel sounds are normal. She exhibits no distension. There is no tenderness.   Negative for hepatic asterixis.    Neurological: No cranial nerve deficit. Coordination normal.   conscious but only oriented to person and place.    Skin: She is not diaphoretic.   Vitals reviewed.      Recent Labs  Lab 01/23/17  0746 01/23/17  1606 01/23/17  2344   WBC 7.80 7.08 6.96   HGB 10.4* 10.1* 9.2*   HCT 30.4* 30.1* 26.9*   PLT 63* 60* 54*       Recent Labs  Lab 01/21/17  0501 01/22/17  0622 01/23/17  0555     141 136 136   K 3.5  3.5 4.1 4.0     109 106 104   CO2 16*  16* 23 25   BUN 7  7 7 8   CREATININE 0.7  0.7 0.6 0.6   CALCIUM 9.2  9.2 8.4* 8.4*   MG 1.6 1.3* 1.4*      Recent Labs  Lab 01/21/17  0501 01/22/17  0622 01/23/17  0555   ALBUMIN 3.3*  3.3* 2.9* 2.8*   PROT 7.2  7.2 6.3 6.2   BILITOT 7.5*  7.5* 10.0* 9.4*   ALKPHOS 235*  235* 138* 121   ALT 51*  51* 40 35   AST 94*  94* 72* 65*

## 2017-01-24 NOTE — PLAN OF CARE
Problem: Physical Therapy Goal  Goal: Physical Therapy Goal  Goals to be met by: 2017     Patient will increase functional independence with mobility by performin. Supine to sit with Moderate Assistance- Met  Revised: Supine to sit with supervision   2. Sit to supine with Moderate Assistance- Met  Revised: Sit to supine with Moderate Assistance  3. Sit to stand transfer with Maximum Assistance- Met  Revised: Sit to stand transfer with supervision   4. Gait x 10 feet with Maximum Assistance with or without appropriate AD. - Met  Revised: Gait x 100 feet with Supervision with or without appropriate AD  5. (I) with BLE therapeutic exercises   Outcome: Ongoing (interventions implemented as appropriate)  Pt progressing towards goals.      ROMY MONTAGUE, PT  2017

## 2017-01-24 NOTE — ASSESSMENT & PLAN NOTE
- Happened once, bit her tongue and had some mild bleeding through mouth  - Could be withdrawal symptoms,  - No recurrence.

## 2017-01-24 NOTE — PLAN OF CARE
01/24/17 1635   Final Note   Assessment Type Final Discharge Note   Discharge Disposition ANOTHER INST  ([facility] Transfer)   Discharge planning education complete? Yes   Hospital Follow Up  Appt(s) scheduled? No   Discharge plans and expectations educations in teach back method with documentation complete? No  (Transfer)   Offered [facility] -- Bedside Delivery? n/a   Discharge/Hospital Encounter Summary to (non[facility]) PCP n/a   Referral to Outpatient Case Management complete? n/a   Referral to / orders for Home Health Complete? n/a   30 day supply of medicines given at discharge, if documented non-compliance / non-adherence? n/a   Any social issues identified prior to discharge? No   Did you assess the readiness or willingness of the family or caregiver to support self management of care? No  (sister, Zaria, informed via phone)    Pt transfer to [facility]

## 2017-01-24 NOTE — PROGRESS NOTES
"1/24/2017 10:25 AM   Marely Hamilton   1965   [id number]        Psychiatry Progress Note     SUBJECTIVE:     Patient evaluated this morning. She is awake, alert and oriented to date. She passed SAVEAHAART with 0 errors. She could spell GLOBE forwards, when asked to spell backwards, she started to spell WORLD, but was able to be redirected and spelled GLOBE backwards. She was able to name the city that president Debra is from. She asks some inappropriate questions such as if she should keep thinking "keep calm and carry on," or "keep calm and be still." Denies AVH.     Current Medications:   Scheduled Meds:    folic acid  1,000 mcg Oral Daily    lactulose  15 g Oral TID    lorazepam  1 mg Oral Q6H    miconazole   Topical (Top) BID    rifAXIMimin  550 mg Oral BID    risperidone 1 mg/ml  0.5 mg Oral QHS    thiamine  100 mg Oral Daily    zinc sulfate  220 mg Oral Daily      PRN Meds: dextrose 50%, dextrose 50%, glucagon (human recombinant), glucose, glucose, lorazepam, ondansetron, ramelteon   Psychotherapeutics     Start     Stop Route Frequency Ordered    01/15/17 2050  olanzapine injection 2.5 mg      01/15 2359 IM Once as needed 01/15/17 1950    01/18/17 1609  ramelteon tablet 8 mg      -- Oral Nightly PRN 01/18/17 1509    01/22/17 1224  lorazepam injection 2 mg      -- IV Every 10 min PRN 01/22/17 1125    01/23/17 1800  lorazepam tablet 1 mg      -- Oral Every 6 hours 01/23/17 1530    01/23/17 2100  risperidone 1 mg/ml oral solution 0.5 mg      -- Oral Nightly 01/23/17 1844          Allergies:   Review of patient's allergies indicates:   Allergen Reactions    Sulfa (sulfonamide antibiotics) Rash        OBJECTIVE:   Vitals   Vitals:    01/24/17 0800   BP: 117/63   Pulse: 84   Resp: 18   Temp: 98 °F (36.7 °C)        Labs/Imaging/Studies:   Recent Results (from the past 36 hour(s))   CBC auto differential    Collection Time: 01/22/17 11:21 PM   Result Value Ref Range    WBC 7.92 3.90 - 12.70 K/uL    RBC " 2.88 (L) 4.00 - 5.40 M/uL    Hemoglobin 9.4 (L) 12.0 - 16.0 g/dL    Hematocrit 26.9 (L) 37.0 - 48.5 %    MCV 93 82 - 98 fL    MCH 32.6 (H) 27.0 - 31.0 pg    MCHC 34.9 32.0 - 36.0 %    RDW 16.4 (H) 11.5 - 14.5 %    Platelets 53 (L) 150 - 350 K/uL    MPV 9.4 9.2 - 12.9 fL    Gran # 4.8 1.8 - 7.7 K/uL    Lymph # 2.0 1.0 - 4.8 K/uL    Mono # 0.8 0.3 - 1.0 K/uL    Eos # 0.2 0.0 - 0.5 K/uL    Baso # 0.02 0.00 - 0.20 K/uL    Gran% 60.8 38.0 - 73.0 %    Lymph% 25.4 18.0 - 48.0 %    Mono% 10.6 4.0 - 15.0 %    Eosinophil% 2.4 0.0 - 8.0 %    Basophil% 0.3 0.0 - 1.9 %    Differential Method Automated    Comprehensive Metabolic Panel (CMP)    Collection Time: 01/23/17  5:55 AM   Result Value Ref Range    Sodium 136 136 - 145 mmol/L    Potassium 4.0 3.5 - 5.1 mmol/L    Chloride 104 95 - 110 mmol/L    CO2 25 23 - 29 mmol/L    Glucose 111 (H) 70 - 110 mg/dL    BUN, Bld 8 6 - 20 mg/dL    Creatinine 0.6 0.5 - 1.4 mg/dL    Calcium 8.4 (L) 8.7 - 10.5 mg/dL    Total Protein 6.2 6.0 - 8.4 g/dL    Albumin 2.8 (L) 3.5 - 5.2 g/dL    Total Bilirubin 9.4 (H) 0.1 - 1.0 mg/dL    Alkaline Phosphatase 121 55 - 135 U/L    AST 65 (H) 10 - 40 U/L    ALT 35 10 - 44 U/L    Anion Gap 7 (L) 8 - 16 mmol/L    eGFR if African American >60.0 >60 mL/min/1.73 m^2    eGFR if non African American >60.0 >60 mL/min/1.73 m^2   Magnesium    Collection Time: 01/23/17  5:55 AM   Result Value Ref Range    Magnesium 1.4 (L) 1.6 - 2.6 mg/dL   PT/INR    Collection Time: 01/23/17  5:55 AM   Result Value Ref Range    Prothrombin Time 15.3 (H) 9.0 - 12.5 sec    INR 1.5 (H) 0.8 - 1.2   CBC auto differential    Collection Time: 01/23/17  7:46 AM   Result Value Ref Range    WBC 7.80 3.90 - 12.70 K/uL    RBC 3.17 (L) 4.00 - 5.40 M/uL    Hemoglobin 10.4 (L) 12.0 - 16.0 g/dL    Hematocrit 30.4 (L) 37.0 - 48.5 %    MCV 96 82 - 98 fL    MCH 32.8 (H) 27.0 - 31.0 pg    MCHC 34.2 32.0 - 36.0 %    RDW 16.3 (H) 11.5 - 14.5 %    Platelets 63 (L) 150 - 350 K/uL    MPV 9.7 9.2 - 12.9 fL     Gran # 5.5 1.8 - 7.7 K/uL    Lymph # 1.5 1.0 - 4.8 K/uL    Mono # 0.7 0.3 - 1.0 K/uL    Eos # 0.1 0.0 - 0.5 K/uL    Baso # 0.03 0.00 - 0.20 K/uL    Gran% 70.3 38.0 - 73.0 %    Lymph% 19.1 18.0 - 48.0 %    Mono% 8.5 4.0 - 15.0 %    Eosinophil% 1.4 0.0 - 8.0 %    Basophil% 0.4 0.0 - 1.9 %    Differential Method Automated    CBC auto differential    Collection Time: 01/23/17  4:06 PM   Result Value Ref Range    WBC 7.08 3.90 - 12.70 K/uL    RBC 3.12 (L) 4.00 - 5.40 M/uL    Hemoglobin 10.1 (L) 12.0 - 16.0 g/dL    Hematocrit 30.1 (L) 37.0 - 48.5 %    MCV 97 82 - 98 fL    MCH 32.4 (H) 27.0 - 31.0 pg    MCHC 33.6 32.0 - 36.0 %    RDW 16.3 (H) 11.5 - 14.5 %    Platelets 60 (L) 150 - 350 K/uL    MPV 9.5 9.2 - 12.9 fL    Gran # 4.8 1.8 - 7.7 K/uL    Lymph # 1.6 1.0 - 4.8 K/uL    Mono # 0.5 0.3 - 1.0 K/uL    Eos # 0.1 0.0 - 0.5 K/uL    Baso # 0.02 0.00 - 0.20 K/uL    Gran% 67.8 38.0 - 73.0 %    Lymph% 23.2 18.0 - 48.0 %    Mono% 6.5 4.0 - 15.0 %    Eosinophil% 1.8 0.0 - 8.0 %    Basophil% 0.3 0.0 - 1.9 %    Differential Method Automated    CBC auto differential    Collection Time: 01/23/17 11:44 PM   Result Value Ref Range    WBC 6.96 3.90 - 12.70 K/uL    RBC 2.86 (L) 4.00 - 5.40 M/uL    Hemoglobin 9.2 (L) 12.0 - 16.0 g/dL    Hematocrit 26.9 (L) 37.0 - 48.5 %    MCV 94 82 - 98 fL    MCH 32.2 (H) 27.0 - 31.0 pg    MCHC 34.2 32.0 - 36.0 %    RDW 16.2 (H) 11.5 - 14.5 %    Platelets 54 (L) 150 - 350 K/uL    MPV 10.0 9.2 - 12.9 fL    Gran # 4.6 1.8 - 7.7 K/uL    Lymph # 1.7 1.0 - 4.8 K/uL    Mono # 0.5 0.3 - 1.0 K/uL    Eos # 0.2 0.0 - 0.5 K/uL    Baso # 0.03 0.00 - 0.20 K/uL    Gran% 65.9 38.0 - 73.0 %    Lymph% 24.0 18.0 - 48.0 %    Mono% 7.0 4.0 - 15.0 %    Eosinophil% 2.4 0.0 - 8.0 %    Basophil% 0.4 0.0 - 1.9 %    Differential Method Automated    PT/INR    Collection Time: 01/24/17  6:30 AM   Result Value Ref Range    Prothrombin Time 15.0 (H) 9.0 - 12.5 sec    INR 1.5 (H) 0.8 - 1.2   CBC auto differential    Collection Time:  "01/24/17  8:06 AM   Result Value Ref Range    WBC 4.86 3.90 - 12.70 K/uL    RBC 2.92 (L) 4.00 - 5.40 M/uL    Hemoglobin 9.6 (L) 12.0 - 16.0 g/dL    Hematocrit 28.0 (L) 37.0 - 48.5 %    MCV 96 82 - 98 fL    MCH 32.9 (H) 27.0 - 31.0 pg    MCHC 34.3 32.0 - 36.0 %    RDW 16.1 (H) 11.5 - 14.5 %    Platelets 55 (L) 150 - 350 K/uL    MPV 9.7 9.2 - 12.9 fL    Gran # 3.3 1.8 - 7.7 K/uL    Lymph # 1.0 1.0 - 4.8 K/uL    Mono # 0.4 0.3 - 1.0 K/uL    Eos # 0.1 0.0 - 0.5 K/uL    Baso # 0.01 0.00 - 0.20 K/uL    Gran% 67.5 38.0 - 73.0 %    Lymph% 21.0 18.0 - 48.0 %    Mono% 8.4 4.0 - 15.0 %    Eosinophil% 2.7 0.0 - 8.0 %    Basophil% 0.2 0.0 - 1.9 %    Differential Method Automated         Mental Status Exam:   Arousal: awake, lying in bed  Appearance: jaundiced, disheveled, excoriations around mouth  Sensorium/Orientation: oriented to person and place, possibly oriented to time (reading from board)  Grooming: poor  Behavior/Cooperation: cooperative, good eye contact, disorganized behavior  Psychomotor: no PMA/PMR  Speech: normal rate, volume, tone   Language: fluent English  Mood: "much better"   Affect: constricted, improving  Thought Process: tangential  Thought Content: denies SI/HI/AVH  Associations: loose associations based on objects in room, on tv, or on her board. Conversation shifts based on new observations and phrases/signs written in the room  Attention/Concentration: passed SAVEAHAART  Memory: impaired  Fund of Knowledge: intact   Insight: impaired   Judgment: impaired    ASSESSMENT/PLAN:   Hepatic Encephalopathy (R/O Wernicke's)   Bipolar Disorder I, unspecified   Alcohol Use Disorder, severe  Benzodiazepine Use Disorder, unspecified   Benzodiazepine/Alcohol Withdrawal    Recommendations:    Continue withdrawal precautions, monitor vital every 4 hours. She was prescribed klonopin 2mg po 4x day prior to entering the hospital and has h/o alcohol dependence.   - Continue scheduled Ativan taper with prn Ativan 2mg po " every 4 hours prn systolic blood pressure > 160, Diastolic Blood pressure >110, heart rate > 110, tremors, diaphoresis, or other signs of withdrawal.  - Continue  Lithium 300mg po QHS, she was unable to tolerate higher doses in the past.   - EKG 1/20/17 showed improved QTc @ 490 (compared to QTc 503 on 1/15/17). Recommend scheduled Risperdal 0.5mg mtabs po QHS to regulate sleep/wake cycle and manage psychosis during delirium. Will order a repeat EKG to monitor QTc. This can also be used as a duel mood stabilizer. She has tolerated this medication in the past.   - Multivitamin and Folate qdaily   - Obtain thiamine level, replete thiamine via IV route, recommend Neuro consult and consider MRI brain.    Case discussed w Dr. Corado.  Brannon Wiedemann, M.D.   [facility] Psychiatry PGY4  1/24/2017

## 2017-01-24 NOTE — PT/OT/SLP PROGRESS
"Occupational Therapy  Treatment    Marely Hamilton   MRN: [id number]   Admitting Diagnosis: Hepatic encephalopathy    OT Date of Treatment: 17   OT Start Time: 926  OT Stop Time: 1000  OT Total Time (min): 34 min    Billable Minutes:  Self Care/Home Management 24 minutes and Therapeutic Activity 10 minutes    General Precautions: Standard, fall    Do you have any cultural, spiritual, Evangelical conflicts, given your current situation?: None    Subjective:  Communicated with RN prior to session.  "I need to look better for when my mom comes."    Pain Ratin/10  Pain Rating Post-Intervention: 0/10    Objective:   Pt found supine with HOB elevated. Pt agreeable to therapy.     Functional Mobility:  Bed Mobility:  Scooting/Bridging: Supervision  Supine to Sit: Stand by Assistance  Sit to Supine: Stand by Assistance    Transfers:  Sit <> Stand Assistance: Stand By Assistance  Sit <> Stand Assistive Device: No Assistive Device    Functional Ambulation: HHA from bed to bathroom. Pt able to ambulate with SBA, but stated she felt "loopy", therefore CGA provided for safety and guidance of task.    Activities of Daily Living:    Feeding: Setup A  Grooming Position: Standing at sink  Grooming Level of Assistance: Stand by assistance    Balance:   Static Sit: NORMAL: No deviations seen in posture held statically  Dynamic Sit: GOOD+: Maintains balance through MAXIMAL excursions of active trunk motion  Static Stand: GOOD-: Takes MODERATE challenges from all directions inconsistently  Dynamic stand: FAIR+: Needs CLOSE SUPERVISION during gait and is able to right self with minor LOB    Therapeutic Activities and Exercises:  Pt ed re OT role and POC. Pt performed supine to sit and scoot to EOB with SBA. Pt performed sit to stand t/f with SBA and no AD, and ambulated with CGA to bathroom. Pt washed mouth, face, and hands at sink with SBA and cues for location of items. Pt with minor LOB, able to self correct. Pt returned to room " and 'crawled into bed' with LOB noted as she rotated her body to supine; pt ed re safety and technique for supine to sit, pt demo understanding as she repeated supine to sit and sit to supine with SBA. Pt able to scoot to HOB with S. Pt setup with food tray.    AM-PAC 6 CLICK ADL   How much help from another person does this patient currently need?   1 = Unable, Total/Dependent Assistance  2 = A lot, Maximum/Moderate Assistance  3 = A little, Minimum/Contact Guard/Supervision  4 = None, Modified [place]/Independent    Putting on and taking off regular lower body clothing? : 3  Bathing (including washing, rinsing, drying)?: 3  Toileting, which includes using toilet, bedpan, or urinal? : 3  Putting on and taking off regular upper body clothing?: 3  Taking care of personal grooming such as brushing teeth?: 3  Eating meals?: 4  Total Score: 19     AM-PAC Raw Score CMS G-Code Modifier Level of Impairment Assistance   6 % Total / Unable   7 - 9 CM 80 - 100% Maximal Assist   10 - 14 CL 60 - 80% Moderate Assist   15 - 19 CK 40 - 60% Moderate Assist   20 - 22 CJ 20 - 40% Minimal Assist   23 CI 1-20% SBA / CGA   24 CH 0% Independent/ Mod I     Patient left HOB elevated with all lines intact, call button in reach and bed alarm on    ASSESSMENT:  Marely Hamilton is a 51 y.o. female with a medical diagnosis of Hepatic encephalopathy and presents with improved bed mobility, strength, endurance, and balance. Pt orientedx4, but requiring cues for safety and sequencing tasks in the bathroom. Pt able to ambulate with good balance and HHA. Pt demo some decreased balance while turning in stance. Pt would benefit from cont OT to improve self care skills and safety with mobility.    Rehab identified problem list/impairments: Rehab identified problem list/impairments: impaired endurance, impaired self care skills, impaired functional mobilty, gait instability, impaired balance, impaired cognition, decreased safety  awareness    Rehab potential is good.    Activity tolerance: Fair    Discharge recommendations: Discharge Facility/Level Of Care Needs: home with home health     Barriers to discharge: Barriers to Discharge: None    Equipment recommendations: none     GOALS:   Occupational Therapy Goals        Problem: Occupational Therapy Goal    Goal Priority Disciplines Outcome Interventions   Occupational Therapy Goal     OT, PT/OT Ongoing (interventions implemented as appropriate)    Description:  Goals to be met by: 2/19/17     Patient will increase functional independence with ADLs by performing:    UE Dressing with [place].  LE Dressing with [place].  Grooming while standing at sink with [place].  Toileting from toilet with [place] for hygiene and clothing management.   Bathing from  edge of bed with [place].  Toilet transfer to toilet with [place].  Upper extremity exercise program x15 reps per handout, with independence.  Pt will be Ox4.  Pt will state what to do in an emergency situation c 100% accuracy.  Pt will follow simple multi-step commands c 100% accuracy.  Pt will demonstrate good STM/LTM c 100% accuracy.                Plan:  Patient to be seen 5 x/week to address the above listed problems via self-care/home management, therapeutic activities, therapeutic exercises  Plan of Care expires:    Plan of Care reviewed with: patient    Taurus BALBIR Owens  1/24/2017  Pager: [phone]

## 2017-01-24 NOTE — ASSESSMENT & PLAN NOTE
- Non compliance history of her lactulose.   - Ammonia at presentation was 50   - complicated by her administration of Klonopin.   - Hepatitis panel and HIV negative  - Delirium precaution  Daytime: awake, up in chair as tolerated, tv on, window shades up, frequent reorientation.  Nighttime: in bed, minimize interruptions, tv off, window shades down.  - PETH negative   - Hepatology will decide for transplant work up with patient and her sister and they tried to reach her sister and communicate regarding liver transplant workup. If failed to reach sister will communicate mother. If no transplant workup will be initiated, palliative consult is imperative.   - Started on long term vitamin treatment.  - Hepatology recommended to start zinc sulfate capsule 220 mg, and still finding out the decision for work her up for liver transplant.    MELD-Na score: 20 at 1/23/2017  5:55 AM  MELD score: 19 at 1/23/2017  5:55 AM  Calculated from:  Serum Creatinine: 0.6 mg/dL (Rounded to 1) at 1/23/2017  5:55 AM  Serum Sodium: 136 mmol/L at 1/23/2017  5:55 AM  Total Bilirubin: 9.4 mg/dL at 1/23/2017  5:55 AM  INR(ratio): 1.5 at 1/23/2017  5:55 AM  Age: 51 years

## 2017-01-24 NOTE — DISCHARGE SUMMARY
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]  Yes     Priority: 2     Thrombocytopenia [D69.6]  Yes     Priority: 2     Bipolar disorder in remission [F31.70]  Yes     Priority: 3      Hospitalized for crystal and psychosis x 2; also has had depression (total length 4) (duration 3 wks and 1.5 weeks with the others)      Alcohol dependence in remission [F10.21]  Yes     Priority: 5     Anemia [D64.9]  Yes     Priority: 5      6 units PRBC since June 2015      Malnutrition [E46]  Yes     Priority: 6     Alcohol use disorder [F10.99]  Yes    Sedative, hypnotic or anxiolytic use, 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]  Yes    Benzodiazepine withdrawal with complication [F13.239]  No    Tongue laceration [S01.512A]  No    History of seizure [Z87.898]  Not Applicable     One seizure 2013- "low blood sugar from a starvation diet"        Resolved Hospital Problems    Diagnosis Date Resolved POA    Seizure in response to acute event [R56.9] 01/23/2017 No     In responsive to benzo withdrawl      Hematemesis [K92.0] 01/22/2017 Yes    UGIB (upper gastrointestinal bleed) [K92.2] 01/22/2017 Yes       Discharged Condition: admit problems have stabilized       HOSPITAL COURSE:    Initial Presentation:     This is Ms. Marely Hamilton, 51 year female known to have Liver Cirrhosis and ESLD secondary to alcohol abuse, Bipolar disorder she was brought to ED by her mother after she noticed that she was confused and her skin was discolor for the last couple of days. When we evaluate the patient at " "bedside, she was by herself and not accompanied by anyone, patient was not good historian and she couldn't reply the question we were asking. So I called the mother Joy Oconnor [phone] and ask questions about current presentation. So based on mother conversation over the phone she claimed that she was non complaint with her medication especially Lactulose and she did not keep track of her bowel movement on daily basis. Mother also mention that she was having some change in her basal mentation and when you ask her question and doesn't answer question and taking about her sister and cats and "doesn't make sense". Patient lives with her mother. Her mother noticed change in her face color and eyes color in the last couple of weeks more noticeably in the last couple of days. Mother denies if she observed any fever chills, or symptoms systemic infections and lethargy. No clear when she drinks the last alcohol drink.     Course of Principle Problem for Admission:    Encephalopathy    Differential diagnoses included but not limited to hepatic encephalopathy due to noncompliance with lactulose, adverse effect of benzos (UTox +benzos; clonazepam on home med list). Resumed on her lactulose to have better bowel movement, it showed slight improvement in her mentation in the next couple of days, but not return to her baseline. Head CT scan were negative for any acute insult to her brain, EEG showed Probably normal asleep and drowsy EEG with rhythmic mid temporal theta activity, which may signify drowsiness. If suspicion for an   epileptiform disturbance is high, consider repeat or prolonged EEG monitoring in the future. Psychiatry involved and they recommend slow tapering of her Benzodiazepine given her presumptive positive in her urine when she presents to ED. They recommend involving neurology for further workup. Patient still having confabulation and still doesn't return to her baseline mental status. Recommend to " continue her workup from neurological point of view.     Work up for Liver Transplant in Alcoholic Cirrhosis     Hepatology services were consulted and they recommended continuing lactulose with better frequency of bowel movement, her bowel movement were reached target and her MELD score were slightly elevated but stayed the same for the last 3-4 days prior discharge. - PETH negative. Ammonia on presentation showed 50, - Hepatitis panel and HIV negative. Hepatology recommend transfer of care to [facility] to resume her transplant work up. If patient could not continue transplant work up in patient, recommend to continue working her up as outpatient.  MELD-Na score: 18 at 1/24/2017  6:30 AM  MELD score: 18 at 1/24/2017  6:30 AM  Calculated from:  Serum Creatinine: 0.6 mg/dL (Rounded to 1) at 1/24/2017  6:30 AM  Serum Sodium: 137 mmol/L at 1/24/2017  6:30 AM  Total Bilirubin: 6.6 mg/dL at 1/24/2017  6:30 AM  INR(ratio): 1.5 at 1/24/2017  6:30 AM  Age: 51 years    Other Medical Problems Addressed in the Hospital:    Thrombocytopenia and anemia - Could be secondary to her chronic liver disease and hypersplenism.  Recent Labs  Lab 01/23/17  2344 01/24/17  0806 01/24/17  1531   WBC 6.96 4.86 5.75   HGB 9.2* 9.6* 9.5*   HCT 26.9* 28.0* 28.3*   PLT 54* 55* 63*     Chronic liver failure  - Review principal problem for more elaboration     Bipolar disorder in remission  - Hospitalized for crystal and psychosis x 2; also has had depression (total length 4) (duration 3 wks and 1.5 weeks with the others)  - On lithium daily and follow with her psychiatric  - Lithium level is normal Resumed Lithium   - Psychiatry recommended to start patient on Risperidone QHS for delirium and improve her sleep awake cycle       Alcohol dependence in remission-  No clear history of alcohol use Alcohol level is < 10 Started on thiamine daily and will continue after discharge.    CONSULTS: Hepatology, Psychiatry     Pertinent/Significant Diagnostic  Studies:  Head CT scan and EEG.     Disposition:  [facility] for In patient Liver Transplant Work up    Future Scheduled Appointments:  No future appointments.    Last CBC/BMP/HgbA1c (if applicable):  Recent Results (from the past 336 hour(s))   CBC auto differential    Collection Time: 01/24/17  3:31 PM   Result Value Ref Range    WBC 5.75 3.90 - 12.70 K/uL    Hemoglobin 9.5 (L) 12.0 - 16.0 g/dL    Hematocrit 28.3 (L) 37.0 - 48.5 %    Platelets 63 (L) 150 - 350 K/uL   CBC auto differential    Collection Time: 01/24/17  8:06 AM   Result Value Ref Range    WBC 4.86 3.90 - 12.70 K/uL    Hemoglobin 9.6 (L) 12.0 - 16.0 g/dL    Hematocrit 28.0 (L) 37.0 - 48.5 %    Platelets 55 (L) 150 - 350 K/uL   CBC auto differential    Collection Time: 01/23/17 11:44 PM   Result Value Ref Range    WBC 6.96 3.90 - 12.70 K/uL    Hemoglobin 9.2 (L) 12.0 - 16.0 g/dL    Hematocrit 26.9 (L) 37.0 - 48.5 %    Platelets 54 (L) 150 - 350 K/uL     No results found for this or any previous visit (from the past 336 hour(s)).  No results found for: HGBA1C    Discharge Medication List:     Medication List      START taking these medications          lactulose 20 gram/30 mL Soln   Commonly known as:  CHRONULAC   Take 22.5 mLs (15 g total) by mouth 3 (three) times daily.       lorazepam 1 MG tablet   Commonly known as:  ATIVAN   Take 1 tab PO every 8 hours for 2 days, then 1 tab every 12 hours for 2 days, then 1 tab daily for 2 days then STOP.       miconazole 2 % cream   Commonly known as:  MICOTIN   Apply topically 2 (two) times daily. Inguinal area around buttocks       * rifAXIMin 550 mg Tab   Commonly known as:  XIFAXAN   Take 1 tablet (550 mg total) by mouth 2 (two) times daily.       * rifAXIMin 550 mg Tab   Commonly known as:  XIFAXAN   Take 1 tablet (550 mg total) by mouth 2 (two) times daily.       risperidone 1 mg/ml 1 mg/mL Soln   Commonly known as:  RISPERDAL   Take 0.5 mLs (0.5 mg total) by mouth every evening.       thiamine 100 MG tablet    Take 1 tablet (100 mg total) by mouth once daily.       zinc sulfate 220 (50) mg capsule   Commonly known as:  ZINCATE   Take 1 capsule (220 mg total) by mouth once daily.       * Notice:  This list has 2 medication(s) that are the same as other medications prescribed for you. Read the directions carefully, and ask your doctor or other care provider to review them with you.      CONTINUE taking these medications          COMBIGAN 0.2-0.5 % Drop   Generic drug:  brimonidine-timolol       folic acid 1 MG tablet   Commonly known as:  FOLVITE   TAKE ONE TABLET BY MOUTH EVERY DAY       lithium 300 MG capsule   Commonly known as:  ESKALITH       ondansetron 4 MG tablet   Commonly known as:  ZOFRAN       ONE DAILY MULTIVITAMIN per tablet   Generic drug:  multivitamin       pantoprazole 40 MG tablet   Commonly known as:  PROTONIX       pilocarpine HCL 1% 1 % ophthalmic solution   Commonly known as:  PILOCAR       vitamin D 1000 units Tab            Where to Get Your Medications      These medications were sent to [facility] - [place], [place] - [address], [place] [address]     Phone:  [phone]     rifAXIMin 550 mg Tab         You can get these medications from any pharmacy     You don't need a prescription for these medications     miconazole 2 % cream    thiamine 100 MG tablet    zinc sulfate 220 (50) mg capsule         Information about where to get these medications is not yet available     ! Ask your nurse or doctor about these medications     lactulose 20 gram/30 mL Soln    lorazepam 1 MG tablet    rifAXIMin 550 mg Tab    risperidone 1 mg/ml 1 mg/mL Soln             Patient Instructions:  No discharge procedures on file.    Signing Physician:  Adin Sawyer MD

## 2017-01-24 NOTE — PLAN OF CARE
CM called [facility], for update on pt transfer stating pt has been accepted per Dr. Anguiano awaiting bed assignment and insurance authorization.

## 2017-01-24 NOTE — ASSESSMENT & PLAN NOTE
- Hospitalized for crystal and psychosis x 2; also has had depression (total length 4) (duration 3 wks and 1.5 weeks with the others)  - On lithium daily and follow with her psychiatric  - Lithium level is normal  Resumed Lithium   - Psychiatry recommended to start patient on Risperidone QHS

## 2017-01-24 NOTE — PLAN OF CARE
CM rounding with IM team.  Team spoke with Liver service and pt will be a transfer to [facility] with original Liver workup and pt informed.  Transfer center to be notified of transfer and will follow.

## 2017-01-24 NOTE — PROGRESS NOTES
"[facility] Hospital Medicine  Progress Note    Patient Name: Marely Hamilton  MRN: [id number]  Patient Class: IP- Inpatient   Admission Date: 1/15/2017  Length of Stay: 9 days  Attending Physician: Blessing Cherry MD  Primary Care Provider: Viktor Ross MD    [facility] Medicine Team: [facility] HOSP MED 5 Adin Sawyer MD    Subjective:     Principal Problem:Hepatic encephalopathy    HPI:  Marely Hamilton, 51 year female known to have Liver Cirrhosis and ESLD secondary to alcohol abuse, Bipolar disorder she was brought to ED by her mother after she noticed increasing confusion and skin discoloration of several days duration prior to admission.    When hospital medicine evaluated the patient at bedside, she was by herself and not accompanied by anyone, patient was not good historian and she couldn't reply the questions they were asking. Primary team called the mother Joy Oconnor [phone] and asked questions about her current presentation. So based on mothers' conversation over the phone she claimed that she was non complaint with her medication especially Lactulose and she did not keep track of her bowel movement on daily basis. Mother also mentioned that she was having some change in her basal mentation and when you ask her question and doesn't answer question and taking about her sister and cats and "doesn't make sense". Patient lives with her mother. Her mother noticed change in her face color and eyes color in the last couple of weeks more noticeably in the last couple of days. Mother denies if she observed any fever chills, or symptoms systemic infections and lethargy. Not clear when she drinks the last alcohol drink.            Hospital Course:  Patient admitted to hospital medicine and treated for hepatic encephalopathy - per notes, pt improved on PO lactulose and evaluated by liver transplant and psychiatry. ICU consulted on 1/21/17 after pt had 1 episode of what was thought to be " hematemesis but later upon inspection was a bleed from mouth trauma secondary to a seizure.     01/22/17 - Patient was scheduled to get ativan, but seized before administration of ativan this morning. The patient was treated with ativan 2 mg IV once and started on 1 mg q6 hours. 1 unit of FFP ordered for her oral pharyngeal bleed. Mouth packed. The patient is currently being considered for hepatic transplantation. If she does not elect for this then palliation should be considered.        Interval History:     Over night, she was doing fine, no active complain. Seems to be more verbal and has some lose of association in her conversation.     Review of Systems   Constitutional: Positive for fatigue. Negative for activity change, appetite change, chills, diaphoresis and fever.   HENT: Negative for congestion and dental problem.    Eyes: Negative for photophobia, pain, discharge and redness.   Respiratory: Negative for apnea, cough, choking and shortness of breath.    Cardiovascular: Negative for chest pain, palpitations and leg swelling.   Gastrointestinal: Negative for abdominal distention, abdominal pain, anal bleeding and blood in stool.   Genitourinary: Negative for difficulty urinating, dysuria, flank pain and frequency.   Musculoskeletal: Negative for back pain and gait problem.   Skin: Positive for color change and pallor.   Neurological: Negative for dizziness, seizures, facial asymmetry and headaches.   Hematological: Does not bruise/bleed easily.     Objective:     Vital Signs (Most Recent):  Temp: 98.3 °F (36.8 °C) (01/24/17 0515)  Pulse: 81 (01/24/17 0515)  Resp: 18 (01/24/17 0515)  BP: 109/60 (01/24/17 0515)  SpO2: 95 % (01/24/17 0515) Vital Signs (24h Range):  Temp:  [97.9 °F (36.6 °C)-98.6 °F (37 °C)] 98.3 °F (36.8 °C)  Pulse:  [81-97] 81  Resp:  [14-18] 18  SpO2:  [94 %-97 %] 95 %  BP: (109-144)/(60-76) 109/60     Weight: 49.4 kg (109 lb)  Body mass index is 19.31 kg/(m^2).    Intake/Output Summary  (Last 24 hours) at 01/24/17 0707  Last data filed at 01/23/17 1800   Gross per 24 hour   Intake              820 ml   Output                0 ml   Net              820 ml      Physical Exam   Constitutional: She appears well-developed. No distress.   HENT:   Sclerae of face and conjunctivae    Eyes: Right eye exhibits no discharge. Left eye exhibits no discharge. Scleral icterus is present.   Neck: Normal range of motion. Neck supple. No JVD present. No thyromegaly present.   Cardiovascular: Normal rate and regular rhythm.  Exam reveals no friction rub.    No murmur heard.  Pulmonary/Chest: Effort normal and breath sounds normal. No respiratory distress. She has no wheezes. She has no rales.   Abdominal: Soft. Bowel sounds are normal. She exhibits no distension. There is no tenderness.   Negative for hepatic asterixis.    Neurological: No cranial nerve deficit. Coordination normal.   conscious but only oriented to person and place.    Skin: She is not diaphoretic.   Vitals reviewed.      Recent Labs  Lab 01/23/17  0746 01/23/17  1606 01/23/17  2344   WBC 7.80 7.08 6.96   HGB 10.4* 10.1* 9.2*   HCT 30.4* 30.1* 26.9*   PLT 63* 60* 54*       Recent Labs  Lab 01/21/17  0501 01/22/17  0622 01/23/17  0555     141 136 136   K 3.5  3.5 4.1 4.0     109 106 104   CO2 16*  16* 23 25   BUN 7  7 7 8   CREATININE 0.7  0.7 0.6 0.6   CALCIUM 9.2  9.2 8.4* 8.4*   MG 1.6 1.3* 1.4*      Recent Labs  Lab 01/21/17  0501 01/22/17  0622 01/23/17  0555   ALBUMIN 3.3*  3.3* 2.9* 2.8*   PROT 7.2  7.2 6.3 6.2   BILITOT 7.5*  7.5* 10.0* 9.4*   ALKPHOS 235*  235* 138* 121   ALT 51*  51* 40 35   AST 94*  94* 72* 65*         Assessment/Plan:      * Hepatic encephalopathy  - Non compliance history of her lactulose.   - Ammonia at presentation was 50   - complicated by her administration of Klonopin.   - Hepatitis panel and HIV negative  - Delirium precaution  Daytime: awake, up in chair as tolerated, tv on, window shades  up, frequent reorientation.  Nighttime: in bed, minimize interruptions, tv off, window shades down.  - PETH negative   - Hepatology will decide for transplant work up with patient and her sister and they tried to reach her sister and communicate regarding liver transplant workup. If failed to reach sister will communicate mother. If no transplant workup will be initiated, palliative consult is imperative.   - Started on long term vitamin treatment.  - Hepatology recommended to start zinc sulfate capsule 220 mg  - Hepatology recommend to resume her work up as outpatient in [facility] since she is already started the work up.    MELD-Na score: 20 at 1/23/2017  5:55 AM  MELD score: 19 at 1/23/2017  5:55 AM  Calculated from:  Serum Creatinine: 0.6 mg/dL (Rounded to 1) at 1/23/2017  5:55 AM  Serum Sodium: 136 mmol/L at 1/23/2017  5:55 AM  Total Bilirubin: 9.4 mg/dL at 1/23/2017  5:55 AM  INR(ratio): 1.5 at 1/23/2017  5:55 AM  Age: 51 years      Cirrhosis, Laennec's  - Cirrhosis secondary to alcohol abuse  - Review principal problem for more elaboration     Thrombocytopenia  - Could be secondary to her chronic liver disease and hypersplenism  - off of dvt ppx as the patient has a oral pharyngeal bleed.   Recent Labs  Lab 01/23/17  0746 01/23/17  1606 01/23/17  2344   WBC 7.80 7.08 6.96   HGB 10.4* 10.1* 9.2*   HCT 30.4* 30.1* 26.9*   PLT 63* 60* 54*       Chronic liver failure  - Review principal problem for more elaboration    Bipolar disorder in remission  - Hospitalized for crystal and psychosis x 2; also has had depression (total length 4) (duration 3 wks and 1.5 weeks with the others)  - On lithium daily and follow with her psychiatric  - Lithium level is normal  Resumed Lithium   - Psychiatry recommended to start patient on Risperidone QHS for delirium and improve her sleep awake cycle     Anemia    Recent Labs  Lab 01/23/17  0746 01/23/17  1606 01/23/17  2344   HGB 10.4* 10.1* 9.2*   HCT 30.4* 30.1* 26.9*   -  secondary to chronic liver disease and potentially nutritional deficiencies       Alcohol dependence in remission  - No clear history of alcohol use  - Alcohol level is < 10  - Started on thiamine daily and will continue after discharge    Malnutrition  - Will consult dietary for their input.     History of seizure  - Happened once, bit her tongue and had some mild bleeding through mouth  - Could be withdrawal symptoms,  - No recurrence.       Benzodiazepine withdrawal with complication  - Giving now 1 mg ativan q 6 hours  - Seized before getting ativan dosage this morning  - PRN's in place.       Tongue laceration  - Secondary to seizures.   - Ordered FFP  - IV Vitamin K given.     Alcohol use disorder  - Review above for more elaboration         Hematemesis, resolved as of 1/22/2017      UGIB (upper gastrointestinal bleed), resolved as of 1/22/2017      Seizure in response to acute event, resolved as of 1/23/2017  See problem with benzo withdrawal.       VTE Risk Mitigation         Ordered     Medium Risk of VTE  Once      01/15/17 1926          Adin Sawyer MD  Department of Hospital Medicine   [facility]

## 2017-01-24 NOTE — ASSESSMENT & PLAN NOTE
- Could be secondary to her chronic liver disease and hypersplenism  - off of dvt ppx as the patient has a oral pharyngeal bleed.   Recent Labs  Lab 01/23/17  0746 01/23/17  1606 01/23/17  2344   WBC 7.80 7.08 6.96   HGB 10.4* 10.1* 9.2*   HCT 30.4* 30.1* 26.9*   PLT 63* 60* 54*

## 2017-01-24 NOTE — PLAN OF CARE
Problem: Occupational Therapy Goal  Goal: Occupational Therapy Goal  Goals to be met by: 2/19/17     Patient will increase functional independence with ADLs by performing:    UE Dressing with Mcintosh.  LE Dressing with Mcintosh.  Grooming while standing at sink with Mcintosh.  Toileting from toilet with Mcintosh for hygiene and clothing management.   Bathing from edge of bed with Mcintosh.  Toilet transfer to toilet with Mcintosh.  Upper extremity exercise program x15 reps per handout, with independence.  Pt will be Ox4.  Pt will state what to do in an emergency situation c 100% accuracy.  Pt will follow simple multi-step commands c 100% accuracy.  Pt will demonstrate good STM/LTM c 100% accuracy.   Outcome: Ongoing (interventions implemented as appropriate)  Goals remain appropriate. Cont POC.     BALBIR Blevins  1/24/2017  Pager: [phone]

## 2017-01-24 NOTE — PT/OT/SLP PROGRESS
Physical Therapy  Treatment    Marely Hamilton   MRN: [id number]   Admitting Diagnosis: Hepatic encephalopathy    PT Received On: 17  PT Start Time: 1251     PT Stop Time: 1308    PT Total Time (min): 17 min       Billable Minutes:  Gait Training9 and Therapeutic Activity 8    Treatment Type: Treatment  PT/PTA: PT     PTA Visit Number: 0       General Precautions: Standard, fall  Orthopedic Precautions: N/A   Braces: N/A         Subjective:  Communicated with RN prior to session.  Pt agreeable to therapy session    Pain Ratin/10              Pain Rating Post-Intervention: 0/10    Objective:   Patient found with: peripheral IV    Functional Mobility:  Bed Mobility:    Pt seated EOB when PT entered room    Transfers:  Sit <> Stand Assistance: Stand By Assistance  Sit <> Stand Assistive Device: No Assistive Device  Toilet Transfer Assistance: Contact Guard Assistance  Toilet Transfer Assistive Device: No Assistive Device    Gait:   Gait Distance: ~75ft with 2 LOB 2* easily distractable while amb in hallway with mild SOB  Assistance 1: Contact Guard Assistance  Gait Assistive Device: No device  Gait Pattern: reciprocal  Gait Deviation(s): decreased alex, decreased step length, decreased stride length    Balance:   Static Sit: GOOD-: Takes MODERATE challenges from all directions but inconsistently  Dynamic Sit: GOOD-: Maintains balance through MODERATE excursions of active trunk movement,     Static Stand: FAIR+: Takes MINIMAL challenges from all directions  Dynamic stand: FAIR: Needs CONTACT GUARD during gait     Therapeutic Activities and Exercises:  Pt educated on safety with amb.  Pt performed toileting and assistance needed for donning brief.  Pt safe to amb in hallway with RN staff.      AM-PAC 6 CLICK MOBILITY  How much help from another person does this patient currently need?   1 = Unable, Total/Dependent Assistance  2 = A lot, Maximum/Moderate Assistance  3 = A little, Minimum/Contact  Guard/Supervision  4 = None, Modified [place]/Independent    Turning over in bed (including adjusting bedclothes, sheets and blankets)?: 4  Sitting down on and standing up from a chair with arms (e.g., wheelchair, bedside commode, etc.): 3  Moving from lying on back to sitting on the side of the bed?: 3  Moving to and from a bed to a chair (including a wheelchair)?: 3  Need to walk in hospital room?: 3  Climbing 3-5 steps with a railing?: 3  Total Score: 19    AM-PAC Raw Score CMS G-Code Modifier Level of Impairment Assistance   6 % Total / Unable   7 - 9 CM 80 - 100% Maximal Assist   10 - 14 CL 60 - 80% Moderate Assist   15 - 19 CK 40 - 60% Moderate Assist   20 - 22 CJ 20 - 40% Minimal Assist   23 CI 1-20% SBA / CGA   24 CH 0% Independent/ Mod I     Patient left up in chair with all lines intact, call button in reach, RN notified and mother present.    Assessment:  Marely Hamilton is a 51 y.o. female with a medical diagnosis of Hepatic encephalopathy and presents with decreased safety awareness, balance, endurance and overall functional mobility. Pt performed transfers SBA/CGA and amb ~75ft CGA without AD with 2 LOB 2* easily distractable while amb in hallway with mild SOB. Pt will continue to benefit from skilled PT to improve deficits and increase overall functional mobility.     Rehab identified problem list/impairments: Rehab identified problem list/impairments: impaired balance, decreased safety awareness, impaired endurance, impaired functional mobilty, gait instability    Rehab potential is good.    Activity tolerance: Good    Discharge recommendations: Discharge Facility/Level Of Care Needs: home with home health     Barriers to discharge: Barriers to Discharge: None    Equipment recommendations: Equipment Needed After Discharge: none     GOALS:   Physical Therapy Goals        Problem: Physical Therapy Goal    Goal Priority Disciplines Outcome Goal Variances Interventions   Physical Therapy  Goal     PT/OT, PT      Physical Therapy Goal     PT Ongoing (interventions implemented as appropriate)     Description:  Goals to be met by: 2017     Patient will increase functional independence with mobility by performin. Supine to sit with Moderate Assistance- Met  Revised: Supine to sit with supervision   2. Sit to supine with Moderate Assistance- Met  Revised: Sit to supine with Moderate Assistance  3. Sit to stand transfer with Maximum Assistance- Met  Revised: Sit to stand transfer with supervision   4. Gait  x 10 feet with Maximum Assistance with or without appropriate AD. - Met  Revised: Gait  x 100  feet with Supervision with or without appropriate AD  5. (I) with BLE therapeutic exercises                 PLAN:    Patient to be seen 4 x/week  to address the above listed problems via therapeutic exercises, neuromuscular re-education, gait training, therapeutic activities  Plan of Care expires: 17  Plan of Care reviewed with: patient, mother         ROMY MONTAGUE, PT  2017

## 2017-01-25 PROBLEM — S01.512A TONGUE LACERATION: Status: RESOLVED | Noted: 2017-01-22 | Resolved: 2017-01-25

## 2017-01-25 LAB
ALBUMIN SERPL BCP-MCNC: 2.6 G/DL
ALP SERPL-CCNC: 164 U/L
ALT SERPL W/O P-5'-P-CCNC: 35 U/L
ANION GAP SERPL CALC-SCNC: 7 MMOL/L
AST SERPL-CCNC: 64 U/L
BASOPHILS # BLD AUTO: 0.01 K/UL
BASOPHILS # BLD AUTO: 0.02 K/UL
BASOPHILS # BLD AUTO: 0.02 K/UL
BASOPHILS NFR BLD: 0.2 %
BASOPHILS NFR BLD: 0.4 %
BASOPHILS NFR BLD: 0.5 %
BILIRUB SERPL-MCNC: 4.9 MG/DL
BLD PROD TYP BPU: NORMAL
BLOOD UNIT EXPIRATION DATE: NORMAL
BLOOD UNIT TYPE CODE: 5100
BLOOD UNIT TYPE: NORMAL
BUN SERPL-MCNC: 8 MG/DL
CALCIUM SERPL-MCNC: 8.5 MG/DL
CHLORIDE SERPL-SCNC: 107 MMOL/L
CO2 SERPL-SCNC: 24 MMOL/L
CODING SYSTEM: NORMAL
CREAT SERPL-MCNC: 0.6 MG/DL
DIFFERENTIAL METHOD: ABNORMAL
DISPENSE STATUS: NORMAL
EOSINOPHIL # BLD AUTO: 0.1 K/UL
EOSINOPHIL NFR BLD: 2.2 %
EOSINOPHIL NFR BLD: 2.4 %
EOSINOPHIL NFR BLD: 2.5 %
ERYTHROCYTE [DISTWIDTH] IN BLOOD BY AUTOMATED COUNT: 16.1 %
ERYTHROCYTE [DISTWIDTH] IN BLOOD BY AUTOMATED COUNT: 16.4 %
ERYTHROCYTE [DISTWIDTH] IN BLOOD BY AUTOMATED COUNT: 16.4 %
EST. GFR  (AFRICAN AMERICAN): >60 ML/MIN/1.73 M^2
EST. GFR  (NON AFRICAN AMERICAN): >60 ML/MIN/1.73 M^2
GLUCOSE SERPL-MCNC: 94 MG/DL
HCT VFR BLD AUTO: 25.2 %
HCT VFR BLD AUTO: 28.3 %
HCT VFR BLD AUTO: 30.5 %
HGB BLD-MCNC: 10.1 G/DL
HGB BLD-MCNC: 8.7 G/DL
HGB BLD-MCNC: 9.6 G/DL
INR PPP: 1.6
LYMPHOCYTES # BLD AUTO: 1.1 K/UL
LYMPHOCYTES # BLD AUTO: 1.3 K/UL
LYMPHOCYTES # BLD AUTO: 1.4 K/UL
LYMPHOCYTES NFR BLD: 27.4 %
LYMPHOCYTES NFR BLD: 28.4 %
LYMPHOCYTES NFR BLD: 29.3 %
MAGNESIUM SERPL-MCNC: 1.6 MG/DL
MCH RBC QN AUTO: 32.2 PG
MCH RBC QN AUTO: 32.6 PG
MCH RBC QN AUTO: 32.8 PG
MCHC RBC AUTO-ENTMCNC: 33.1 %
MCHC RBC AUTO-ENTMCNC: 33.9 %
MCHC RBC AUTO-ENTMCNC: 34.5 %
MCV RBC AUTO: 94 FL
MCV RBC AUTO: 97 FL
MCV RBC AUTO: 97 FL
MONOCYTES # BLD AUTO: 0.3 K/UL
MONOCYTES # BLD AUTO: 0.4 K/UL
MONOCYTES # BLD AUTO: 0.4 K/UL
MONOCYTES NFR BLD: 7.2 %
MONOCYTES NFR BLD: 7.6 %
MONOCYTES NFR BLD: 8.4 %
NEUTROPHILS # BLD AUTO: 2.5 K/UL
NEUTROPHILS # BLD AUTO: 2.7 K/UL
NEUTROPHILS # BLD AUTO: 3 K/UL
NEUTROPHILS NFR BLD: 59.7 %
NEUTROPHILS NFR BLD: 61 %
NEUTROPHILS NFR BLD: 62.4 %
PLATELET # BLD AUTO: 56 K/UL
PLATELET # BLD AUTO: 57 K/UL
PLATELET # BLD AUTO: 62 K/UL
PMV BLD AUTO: 10.2 FL
PMV BLD AUTO: 9.3 FL
PMV BLD AUTO: 9.9 FL
POTASSIUM SERPL-SCNC: 4.3 MMOL/L
PROT SERPL-MCNC: 5.8 G/DL
PROTHROMBIN TIME: 16.2 SEC
RBC # BLD AUTO: 2.67 M/UL
RBC # BLD AUTO: 2.93 M/UL
RBC # BLD AUTO: 3.14 M/UL
SODIUM SERPL-SCNC: 138 MMOL/L
TRANS ERYTHROCYTES VOL PATIENT: NORMAL ML
VIT B1 SERPL-MCNC: 40 UG/L (ref 38–122)
WBC # BLD AUTO: 4.05 K/UL
WBC # BLD AUTO: 4.54 K/UL
WBC # BLD AUTO: 4.9 K/UL

## 2017-01-25 PROCEDURE — 25000003 PHARM REV CODE 250: Performed by: STUDENT IN AN ORGANIZED HEALTH CARE EDUCATION/TRAINING PROGRAM

## 2017-01-25 PROCEDURE — 85025 COMPLETE CBC W/AUTO DIFF WBC: CPT | Mod: 91

## 2017-01-25 PROCEDURE — 80053 COMPREHEN METABOLIC PANEL: CPT

## 2017-01-25 PROCEDURE — 25000003 PHARM REV CODE 250: Performed by: HOSPITALIST

## 2017-01-25 PROCEDURE — 11000001 HC ACUTE MED/SURG PRIVATE ROOM

## 2017-01-25 PROCEDURE — 85610 PROTHROMBIN TIME: CPT

## 2017-01-25 PROCEDURE — 36415 COLL VENOUS BLD VENIPUNCTURE: CPT

## 2017-01-25 PROCEDURE — 83735 ASSAY OF MAGNESIUM: CPT

## 2017-01-25 PROCEDURE — 99232 SBSQ HOSP IP/OBS MODERATE 35: CPT | Mod: GC,,, | Performed by: HOSPITALIST

## 2017-01-25 PROCEDURE — 25000003 PHARM REV CODE 250: Performed by: INTERNAL MEDICINE

## 2017-01-25 PROCEDURE — 97803 MED NUTRITION INDIV SUBSEQ: CPT

## 2017-01-25 RX ORDER — LORAZEPAM 1 MG/1
1 TABLET ORAL EVERY 8 HOURS
Status: DISCONTINUED | OUTPATIENT
Start: 2017-01-25 | End: 2017-01-26

## 2017-01-25 RX ORDER — THIAMINE HCL 100 MG
500 TABLET ORAL DAILY
Status: DISCONTINUED | OUTPATIENT
Start: 2017-01-26 | End: 2017-01-26 | Stop reason: HOSPADM

## 2017-01-25 RX ADMIN — MICONAZOLE NITRATE: 20 CREAM TOPICAL at 09:01

## 2017-01-25 RX ADMIN — FOLIC ACID 1000 MCG: 1 TABLET ORAL at 09:01

## 2017-01-25 RX ADMIN — Medication 220 MG: at 09:01

## 2017-01-25 RX ADMIN — MICONAZOLE NITRATE: 20 CREAM TOPICAL at 10:01

## 2017-01-25 RX ADMIN — RIFAXIMIN 550 MG: 550 TABLET ORAL at 09:01

## 2017-01-25 RX ADMIN — RAMELTEON 8 MG: 8 TABLET, FILM COATED ORAL at 10:01

## 2017-01-25 RX ADMIN — LORAZEPAM 1 MG: 1 TABLET ORAL at 02:01

## 2017-01-25 RX ADMIN — LORAZEPAM 1 MG: 1 TABLET ORAL at 05:01

## 2017-01-25 RX ADMIN — Medication 100 MG: at 09:01

## 2017-01-25 RX ADMIN — LITHIUM CARBONATE 300 MG: 300 CAPSULE, GELATIN COATED ORAL at 10:01

## 2017-01-25 RX ADMIN — LACTULOSE 15 G: 10 SOLUTION ORAL at 05:01

## 2017-01-25 RX ADMIN — LORAZEPAM 1 MG: 1 TABLET ORAL at 10:01

## 2017-01-25 RX ADMIN — RIFAXIMIN 550 MG: 550 TABLET ORAL at 10:01

## 2017-01-25 RX ADMIN — LACTULOSE 15 G: 10 SOLUTION ORAL at 02:01

## 2017-01-25 NOTE — MEDICAL/APP STUDENT
"1/25/2017 11:04 AM   Marely Hamilton   1965   [id number]        Psychiatry Progress Note     SUBJECTIVE:   Patient seen and examined this AM. Pt states that she is doing "great" and that she slept well last night. She was able to tell us that [place] being [place] this [place]. She is oriented to person, place, time. She has been improving, and she states that she expects to continue to do so. She is looking forward to getting to [place] to start her workup for liver transplant.     Current Medications:   Scheduled Meds:    folic acid  1,000 mcg Oral Daily    lactulose  15 g Oral TID    lithium carbonate  300 mg Oral QHS    lorazepam  1 mg Oral Q8H    miconazole   Topical (Top) BID    rifAXIMimin  550 mg Oral BID    risperidone 1 mg/ml  0.5 mg Oral QHS    [START ON 1/26/2017] thiamine  500 mg Oral Daily    zinc sulfate  220 mg Oral Daily      PRN Meds: dextrose 50%, dextrose 50%, glucagon (human recombinant), glucose, glucose, lorazepam, ondansetron, ramelteon   Psychotherapeutics     Start     Stop Route Frequency Ordered    01/15/17 2050  olanzapine injection 2.5 mg      01/15 2359 IM Once as needed 01/15/17 1950    01/18/17 1609  ramelteon tablet 8 mg      -- Oral Nightly PRN 01/18/17 1509    01/22/17 1224  lorazepam injection 2 mg      -- IV Every 10 min PRN 01/22/17 1125    01/23/17 2100  risperidone 1 mg/ml oral solution 0.5 mg      -- Oral Nightly 01/23/17 1844    01/24/17 2100  lithium capsule 300 mg      -- Oral Nightly 01/24/17 1318    01/25/17 1400  lorazepam tablet 1 mg      -- Oral Every 8 hours 01/25/17 1028          Allergies:   Review of patient's allergies indicates:   Allergen Reactions    Sulfa (sulfonamide antibiotics) Rash        OBJECTIVE:   Vitals   Vitals:    01/25/17 0800   BP: (!) 121/59   Pulse: 78   Resp: 20   Temp: 98 °F (36.7 °C)        Labs/Imaging/Studies:   Recent Results (from the past 36 hour(s))   CBC auto differential    Collection Time: 01/23/17 11:44 " PM   Result Value Ref Range    WBC 6.96 3.90 - 12.70 K/uL    RBC 2.86 (L) 4.00 - 5.40 M/uL    Hemoglobin 9.2 (L) 12.0 - 16.0 g/dL    Hematocrit 26.9 (L) 37.0 - 48.5 %    MCV 94 82 - 98 fL    MCH 32.2 (H) 27.0 - 31.0 pg    MCHC 34.2 32.0 - 36.0 %    RDW 16.2 (H) 11.5 - 14.5 %    Platelets 54 (L) 150 - 350 K/uL    MPV 10.0 9.2 - 12.9 fL    Gran # 4.6 1.8 - 7.7 K/uL    Lymph # 1.7 1.0 - 4.8 K/uL    Mono # 0.5 0.3 - 1.0 K/uL    Eos # 0.2 0.0 - 0.5 K/uL    Baso # 0.03 0.00 - 0.20 K/uL    Gran% 65.9 38.0 - 73.0 %    Lymph% 24.0 18.0 - 48.0 %    Mono% 7.0 4.0 - 15.0 %    Eosinophil% 2.4 0.0 - 8.0 %    Basophil% 0.4 0.0 - 1.9 %    Differential Method Automated    Comprehensive Metabolic Panel (CMP)    Collection Time: 01/24/17  6:30 AM   Result Value Ref Range    Sodium 137 136 - 145 mmol/L    Potassium 3.8 3.5 - 5.1 mmol/L    Chloride 106 95 - 110 mmol/L    CO2 25 23 - 29 mmol/L    Glucose 123 (H) 70 - 110 mg/dL    BUN, Bld 6 6 - 20 mg/dL    Creatinine 0.6 0.5 - 1.4 mg/dL    Calcium 8.4 (L) 8.7 - 10.5 mg/dL    Total Protein 6.4 6.0 - 8.4 g/dL    Albumin 3.0 (L) 3.5 - 5.2 g/dL    Total Bilirubin 6.6 (H) 0.1 - 1.0 mg/dL    Alkaline Phosphatase 166 (H) 55 - 135 U/L    AST 73 (H) 10 - 40 U/L    ALT 39 10 - 44 U/L    Anion Gap 6 (L) 8 - 16 mmol/L    eGFR if African American >60.0 >60 mL/min/1.73 m^2    eGFR if non African American >60.0 >60 mL/min/1.73 m^2   Magnesium    Collection Time: 01/24/17  6:30 AM   Result Value Ref Range    Magnesium 2.0 1.6 - 2.6 mg/dL   PT/INR    Collection Time: 01/24/17  6:30 AM   Result Value Ref Range    Prothrombin Time 15.0 (H) 9.0 - 12.5 sec    INR 1.5 (H) 0.8 - 1.2   CBC auto differential    Collection Time: 01/24/17  8:06 AM   Result Value Ref Range    WBC 4.86 3.90 - 12.70 K/uL    RBC 2.92 (L) 4.00 - 5.40 M/uL    Hemoglobin 9.6 (L) 12.0 - 16.0 g/dL    Hematocrit 28.0 (L) 37.0 - 48.5 %    MCV 96 82 - 98 fL    MCH 32.9 (H) 27.0 - 31.0 pg    MCHC 34.3 32.0 - 36.0 %    RDW 16.1 (H) 11.5 - 14.5  %    Platelets 55 (L) 150 - 350 K/uL    MPV 9.7 9.2 - 12.9 fL    Gran # 3.3 1.8 - 7.7 K/uL    Lymph # 1.0 1.0 - 4.8 K/uL    Mono # 0.4 0.3 - 1.0 K/uL    Eos # 0.1 0.0 - 0.5 K/uL    Baso # 0.01 0.00 - 0.20 K/uL    Gran% 67.5 38.0 - 73.0 %    Lymph% 21.0 18.0 - 48.0 %    Mono% 8.4 4.0 - 15.0 %    Eosinophil% 2.7 0.0 - 8.0 %    Basophil% 0.2 0.0 - 1.9 %    Differential Method Automated    CBC auto differential    Collection Time: 01/24/17  3:31 PM   Result Value Ref Range    WBC 5.75 3.90 - 12.70 K/uL    RBC 2.92 (L) 4.00 - 5.40 M/uL    Hemoglobin 9.5 (L) 12.0 - 16.0 g/dL    Hematocrit 28.3 (L) 37.0 - 48.5 %    MCV 97 82 - 98 fL    MCH 32.5 (H) 27.0 - 31.0 pg    MCHC 33.6 32.0 - 36.0 %    RDW 16.3 (H) 11.5 - 14.5 %    Platelets 63 (L) 150 - 350 K/uL    MPV 9.6 9.2 - 12.9 fL    Gran # 3.7 1.8 - 7.7 K/uL    Lymph # 1.4 1.0 - 4.8 K/uL    Mono # 0.4 0.3 - 1.0 K/uL    Eos # 0.2 0.0 - 0.5 K/uL    Baso # 0.02 0.00 - 0.20 K/uL    Gran% 65.1 38.0 - 73.0 %    Lymph% 24.5 18.0 - 48.0 %    Mono% 7.3 4.0 - 15.0 %    Eosinophil% 2.6 0.0 - 8.0 %    Basophil% 0.3 0.0 - 1.9 %    Differential Method Automated    CBC auto differential    Collection Time: 01/25/17 12:15 AM   Result Value Ref Range    WBC 4.54 3.90 - 12.70 K/uL    RBC 2.67 (L) 4.00 - 5.40 M/uL    Hemoglobin 8.7 (L) 12.0 - 16.0 g/dL    Hematocrit 25.2 (L) 37.0 - 48.5 %    MCV 94 82 - 98 fL    MCH 32.6 (H) 27.0 - 31.0 pg    MCHC 34.5 32.0 - 36.0 %    RDW 16.4 (H) 11.5 - 14.5 %    Platelets 56 (L) 150 - 350 K/uL    MPV 10.2 9.2 - 12.9 fL    Gran # 2.7 1.8 - 7.7 K/uL    Lymph # 1.3 1.0 - 4.8 K/uL    Mono # 0.4 0.3 - 1.0 K/uL    Eos # 0.1 0.0 - 0.5 K/uL    Baso # 0.01 0.00 - 0.20 K/uL    Gran% 59.7 38.0 - 73.0 %    Lymph% 29.3 18.0 - 48.0 %    Mono% 8.4 4.0 - 15.0 %    Eosinophil% 2.2 0.0 - 8.0 %    Basophil% 0.2 0.0 - 1.9 %    Differential Method Automated    Comprehensive Metabolic Panel (CMP)    Collection Time: 01/25/17  4:28 AM   Result Value Ref Range    Sodium 138 136 -  "145 mmol/L    Potassium 4.3 3.5 - 5.1 mmol/L    Chloride 107 95 - 110 mmol/L    CO2 24 23 - 29 mmol/L    Glucose 94 70 - 110 mg/dL    BUN, Bld 8 6 - 20 mg/dL    Creatinine 0.6 0.5 - 1.4 mg/dL    Calcium 8.5 (L) 8.7 - 10.5 mg/dL    Total Protein 5.8 (L) 6.0 - 8.4 g/dL    Albumin 2.6 (L) 3.5 - 5.2 g/dL    Total Bilirubin 4.9 (H) 0.1 - 1.0 mg/dL    Alkaline Phosphatase 164 (H) 55 - 135 U/L    AST 64 (H) 10 - 40 U/L    ALT 35 10 - 44 U/L    Anion Gap 7 (L) 8 - 16 mmol/L    eGFR if African American >60.0 >60 mL/min/1.73 m^2    eGFR if non African American >60.0 >60 mL/min/1.73 m^2   Magnesium    Collection Time: 01/25/17  4:28 AM   Result Value Ref Range    Magnesium 1.6 1.6 - 2.6 mg/dL   PT/INR    Collection Time: 01/25/17  4:28 AM   Result Value Ref Range    Prothrombin Time 16.2 (H) 9.0 - 12.5 sec    INR 1.6 (H) 0.8 - 1.2   CBC auto differential    Collection Time: 01/25/17  8:06 AM   Result Value Ref Range    WBC 4.05 3.90 - 12.70 K/uL    RBC 2.93 (L) 4.00 - 5.40 M/uL    Hemoglobin 9.6 (L) 12.0 - 16.0 g/dL    Hematocrit 28.3 (L) 37.0 - 48.5 %    MCV 97 82 - 98 fL    MCH 32.8 (H) 27.0 - 31.0 pg    MCHC 33.9 32.0 - 36.0 %    RDW 16.1 (H) 11.5 - 14.5 %    Platelets 57 (L) 150 - 350 K/uL    MPV 9.9 9.2 - 12.9 fL    Gran # 2.5 1.8 - 7.7 K/uL    Lymph # 1.1 1.0 - 4.8 K/uL    Mono # 0.3 0.3 - 1.0 K/uL    Eos # 0.1 0.0 - 0.5 K/uL    Baso # 0.02 0.00 - 0.20 K/uL    Gran% 62.4 38.0 - 73.0 %    Lymph% 27.4 18.0 - 48.0 %    Mono% 7.2 4.0 - 15.0 %    Eosinophil% 2.5 0.0 - 8.0 %    Basophil% 0.5 0.0 - 1.9 %    Differential Method Automated         Mental Status Exam:   Arousal: awake, lying in bed  Appearance: jaundiced, disheveled, excoriations around mouth  Sensorium/Orientation: AOx3  Grooming: poor  Behavior/Cooperation: cooperative, good eye contact  Psychomotor: no PMA/PMR  Speech: normal rate, volume, tone   Language: fluent English  Mood: "great"   Affect: constricted, improving  Thought Process: tangential  Thought " Content: denies SI/HI/AVH  Associations:no loose associations  Attention/Concentration: passed SAVEAHAART  Memory: impaired  Fund of Knowledge: intact   Insight: impaired, improving  Judgment: impaired    ASSESSMENT/PLAN:     Hepatic Encephalopathy (R/O Wernicke's)   Bipolar Disorder I, unspecified   Alcohol Use Disorder, severe  Benzodiazepine Use Disorder, unspecified   Benzodiazepine/Alcohol Withdrawal     Recommendations:   - Pt is preparing for transfer to [facility] today for liver transplant workup  - Continue withdrawal precautions, monitor vital every 4 hours. She was prescribed klonopin 2mg po 4x day prior to entering the hospital and has h/o alcohol dependence.   - Continue scheduled Ativan taper with prn Ativan 2mg po every 4 hours prn systolic blood pressure > 160, Diastolic Blood pressure >110, heart rate > 110, tremors, diaphoresis, or other signs of withdrawal.  - Continue Lithium 300mg po QHS, she was unable to tolerate higher doses in the past.   - EKG 1/20/17 showed improved QTc @ 490 (compared to QTc 503 on 1/15/17). Recommend scheduled Risperdal 0.5mg mtabs po QHS to regulate sleep/wake cycle and manage psychosis during delirium. Will order a repeat EKG to monitor QTc. This can also be used as a duel mood stabilizer. She has tolerated this medication in the past.   - Multivitamin and Folate qdaily   - Continue Thiamine supplementation via IV      Rangel Maya, L3  [facility] [employer]  [facility]

## 2017-01-25 NOTE — PROGRESS NOTES
"[facility] Hospital Medicine  Progress Note    Patient Name: Marely Hamilton  MRN: [id number]  Patient Class: IP- Inpatient   Admission Date: 1/15/2017  Length of Stay: 10 days  Attending Physician: Blessing Cherry MD  Primary Care Provider: Viktor Ross MD    [facility] Medicine Team: [facility] 5 Adin Sawyer MD    Subjective:     Principal Problem:Hepatic encephalopathy    HPI:  Marely Hamilton, 51 year female known to have Liver Cirrhosis and ESLD secondary to alcohol abuse, Bipolar disorder she was brought to ED by her mother after she noticed increasing confusion and skin discoloration of several days duration prior to admission.    When hospital medicine evaluated the patient at bedside, she was by herself and not accompanied by anyone, patient was not good historian and she couldn't reply the questions they were asking. Primary team called the mother Joy Oconnor [phone] and asked questions about her current presentation. So based on mothers' conversation over the phone she claimed that she was non complaint with her medication especially Lactulose and she did not keep track of her bowel movement on daily basis. Mother also mentioned that she was having some change in her basal mentation and when you ask her question and doesn't answer question and taking about her sister and cats and "doesn't make sense". Patient lives with her mother. Her mother noticed change in her face color and eyes color in the last couple of weeks more noticeably in the last couple of days. Mother denies if she observed any fever chills, or symptoms systemic infections and lethargy. Not clear when she drinks the last alcohol drink.            Hospital Course:  Patient admitted to hospital medicine and treated for hepatic encephalopathy - per notes, pt improved on PO lactulose and evaluated by liver transplant and psychiatry. ICU consulted on 1/21/17 after pt had 1 episode of what was thought to be " hematemesis but later upon inspection was a bleed from mouth trauma secondary to a seizure.     01/22/17 - Patient was scheduled to get ativan, but seized before administration of ativan this morning. The patient was treated with ativan 2 mg IV once and started on 1 mg q6 hours. 1 unit of FFP ordered for her oral pharyngeal bleed. Mouth packed. The patient is currently being considered for hepatic transplantation. If she does not elect for this then palliation should be considered.        Interval History:     Over night, she was doing stable no acute events happened overnight, denies any chest pain, SOB. Seems still to have mild encephalopathy, but slowly improved.     Review of Systems   Constitutional: Positive for fatigue. Negative for activity change, appetite change, chills, diaphoresis and fever.   HENT: Negative for congestion and dental problem.    Eyes: Negative for photophobia, pain, discharge and redness.   Respiratory: Negative for apnea, cough, choking and shortness of breath.    Cardiovascular: Negative for chest pain, palpitations and leg swelling.   Gastrointestinal: Negative for abdominal distention, abdominal pain, anal bleeding and blood in stool.   Genitourinary: Negative for difficulty urinating, dysuria, flank pain and frequency.   Musculoskeletal: Negative for back pain and gait problem.   Skin: Positive for color change and pallor.   Neurological: Negative for dizziness, seizures, facial asymmetry and headaches.   Hematological: Does not bruise/bleed easily.     Objective:     Vital Signs (Most Recent):  Temp: 98 °F (36.7 °C) (01/25/17 0800)  Pulse: 78 (01/25/17 0800)  Resp: 20 (01/25/17 0800)  BP: (!) 121/59 (01/25/17 0800)  SpO2: 98 % (01/25/17 0800) Vital Signs (24h Range):  Temp:  [97.9 °F (36.6 °C)-98.1 °F (36.7 °C)] 98 °F (36.7 °C)  Pulse:  [78-94] 78  Resp:  [18-20] 20  SpO2:  [95 %-99 %] 98 %  BP: (106-121)/(55-72) 121/59     Weight: 49.4 kg (109 lb)  Body mass index is 19.31  kg/(m^2).    Intake/Output Summary (Last 24 hours) at 01/25/17 1336  Last data filed at 01/25/17 1000   Gross per 24 hour   Intake              840 ml   Output              301 ml   Net              539 ml      Physical Exam   Constitutional: She appears well-developed. No distress.   HENT:   Sclerae of face and conjunctivae    Eyes: Right eye exhibits no discharge. Left eye exhibits no discharge. Scleral icterus is present.   Neck: Normal range of motion. Neck supple. No JVD present. No thyromegaly present.   Cardiovascular: Normal rate and regular rhythm.  Exam reveals no friction rub.    No murmur heard.  Pulmonary/Chest: Effort normal and breath sounds normal. No respiratory distress. She has no wheezes. She has no rales.   Abdominal: Soft. Bowel sounds are normal. She exhibits no distension. There is no tenderness.   Negative for hepatic asterixis.    Neurological: No cranial nerve deficit. Coordination normal.   conscious but only oriented to person and place.    Skin: She is not diaphoretic.   Vitals reviewed.      Recent Labs  Lab 01/24/17  1531 01/25/17  0015 01/25/17  0806   WBC 5.75 4.54 4.05   HGB 9.5* 8.7* 9.6*   HCT 28.3* 25.2* 28.3*   PLT 63* 56* 57*       Recent Labs  Lab 01/23/17  0555 01/24/17  0630 01/25/17  0428    137 138   K 4.0 3.8 4.3    106 107   CO2 25 25 24   BUN 8 6 8   CREATININE 0.6 0.6 0.6   CALCIUM 8.4* 8.4* 8.5*   MG 1.4* 2.0 1.6        Recent Labs  Lab 01/23/17  0555 01/24/17  0630 01/25/17  0428   ALBUMIN 2.8* 3.0* 2.6*   PROT 6.2 6.4 5.8*   BILITOT 9.4* 6.6* 4.9*   ALKPHOS 121 166* 164*   ALT 35 39 35   AST 65* 73* 64*         Assessment/Plan:      * Hepatic encephalopathy  - Non compliance history of her lactulose.   - Ammonia at presentation was 50   - complicated by her administration of Klonopin.   - Hepatitis panel and HIV negative  - Delirium precaution  Daytime: awake, up in chair as tolerated, tv on, window shades up, frequent reorientation.  Nighttime: in bed,  minimize interruptions, tv off, window shades down.  - PETH negative   - Hepatology will decide for transplant work up with patient and her sister and they tried to reach her sister and communicate regarding liver transplant workup. If failed to reach sister will communicate mother. If no transplant workup will be initiated, palliative consult is imperative.   - Started on long term vitamin treatment.  - Hepatology recommended to start zinc sulfate capsule 220 mg, and still finding out the decision for work her up for liver transplant.    MELD-Na score: 18 at 1/25/2017  4:28 AM  MELD score: 18 at 1/25/2017  4:28 AM  Calculated from:  Serum Creatinine: 0.6 mg/dL (Rounded to 1) at 1/25/2017  4:28 AM  Serum Sodium: 138 mmol/L (Rounded to 137) at 1/25/2017  4:28 AM  Total Bilirubin: 4.9 mg/dL at 1/25/2017  4:28 AM  INR(ratio): 1.6 at 1/25/2017  4:28 AM  Age: 51 years      Cirrhosis, Laennec's  - Cirrhosis secondary to alcohol abuse  - Review principal problem for more elaboration     Thrombocytopenia  - Could be secondary to her chronic liver disease and hypersplenism  - off of dvt ppx as the patient has a oral pharyngeal bleed.   Recent Labs  Lab 01/24/17  1531 01/25/17  0015 01/25/17  0806   WBC 5.75 4.54 4.05   HGB 9.5* 8.7* 9.6*   HCT 28.3* 25.2* 28.3*   PLT 63* 56* 57*       Chronic liver failure  - Review principal problem for more elaboration    Bipolar disorder in remission  - Hospitalized for crystal and psychosis x 2; also has had depression (total length 4) (duration 3 wks and 1.5 weeks with the others)  - On lithium daily and follow with her psychiatric  - Lithium level is normal  Resumed Lithium   - Psychiatry recommended to start patient on Risperidone QHS  - Will slowly taper her from Benzo if we are going to start work her up for transplant     Benzodiazepine withdrawal with complication  - Giving now 1 mg ativan q 6 hours  - Seized before getting ativan dosage this morning  - PRN's in place.     History  of seizure  - Happened once, bit her tongue and had some mild bleeding through mouth  - Could be withdrawal symptoms,  - No recurrence.     Anemia    Recent Labs  Lab 01/24/17  1531 01/25/17  0015 01/25/17  0806   HGB 9.5* 8.7* 9.6*   HCT 28.3* 25.2* 28.3*   - secondary to chronic liver disease and potentially nutritional deficiencies       Alcohol dependence in remission  - No clear history of alcohol use  - Alcohol level is < 10  - Thiamin level is normal  - Increase thiamine to 500 mg PO daily.     Malnutrition  - Will consult dietary for their input.     Alcohol use disorder  - Review above for more elaboration       Sedative, hypnotic or anxiolytic use, unspecified with withdrawal with perceptual disturbances        Hematemesis, resolved as of 1/22/2017        UGIB (upper gastrointestinal bleed), resolved as of 1/22/2017        Seizure in response to acute event, resolved as of 1/23/2017  See problem with benzo withdrawal.       VTE Risk Mitigation         Ordered     Medium Risk of VTE  Once      01/15/17 1926          Adin Sawyer MD  Department of Hospital Medicine   [facility]

## 2017-01-25 NOTE — ASSESSMENT & PLAN NOTE
Recent Labs  Lab 01/24/17  1531 01/25/17  0015 01/25/17  0806   HGB 9.5* 8.7* 9.6*   HCT 28.3* 25.2* 28.3*   - secondary to chronic liver disease and potentially nutritional deficiencies

## 2017-01-25 NOTE — PLAN OF CARE
01/25/17 1407   Right Care Assessment   Can the patient answer the patient profile reliably? No, cognitively impaired   How often would a person be available to care for the patient? Infrequently   Describe the patient's ability to walk at the present time. Minor restrictions or changes   How does the patient rate their overall health at the present time? Fair   During the past month, has the patient often been bothered by feeling down, depressed or hopeless? No   During the past month, has the patient often been bothered by little interest or pleasure in doing things? No   Have you felt little interest or pleasure in doing things? 1     Plan: Transfer to [facility] vs Home with

## 2017-01-25 NOTE — PROGRESS NOTES
"1/25/2017 11:04 AM   Marely Hamilton   1965   [id number]        Psychiatry Progress Note     SUBJECTIVE:   Patient seen and examined this AM. Pt states that she is doing "great" and that she slept well last night. She was able to tell us that she is being [place] to [place] this afternoon. She is oriented to person, place, time. She has been improving, and she states that she expects to continue to do so. She is looking forward to getting to [place] to start her workup for liver transplant.     Current Medications:   Scheduled Meds:    folic acid  1,000 mcg Oral Daily    lactulose  15 g Oral TID    lithium carbonate  300 mg Oral QHS    lorazepam  1 mg Oral Q8H    miconazole   Topical (Top) BID    rifAXIMimin  550 mg Oral BID    risperidone 1 mg/ml  0.5 mg Oral QHS    [START ON 1/26/2017] thiamine  500 mg Oral Daily    zinc sulfate  220 mg Oral Daily      PRN Meds: dextrose 50%, dextrose 50%, glucagon (human recombinant), glucose, glucose, lorazepam, ondansetron, ramelteon   Psychotherapeutics     Start     Stop Route Frequency Ordered    01/15/17 2050  olanzapine injection 2.5 mg      01/15 2359 IM Once as needed 01/15/17 1950    01/18/17 1609  ramelteon tablet 8 mg      -- Oral Nightly PRN 01/18/17 1509    01/22/17 1224  lorazepam injection 2 mg      -- IV Every 10 min PRN 01/22/17 1125    01/23/17 2100  risperidone 1 mg/ml oral solution 0.5 mg      -- Oral Nightly 01/23/17 1844    01/24/17 2100  lithium capsule 300 mg      -- Oral Nightly 01/24/17 1318    01/25/17 1400  lorazepam tablet 1 mg      -- Oral Every 8 hours 01/25/17 1028          Allergies:   Review of patient's allergies indicates:   Allergen Reactions    Sulfa (sulfonamide antibiotics) Rash        OBJECTIVE:   Vitals   Vitals:    01/25/17 0800   BP: (!) 121/59   Pulse: 78   Resp: 20   Temp: 98 °F (36.7 °C)        Labs/Imaging/Studies:   Recent Results (from the past 36 hour(s))   Comprehensive Metabolic Panel (CMP)    Collection Time: " 01/24/17  6:30 AM   Result Value Ref Range    Sodium 137 136 - 145 mmol/L    Potassium 3.8 3.5 - 5.1 mmol/L    Chloride 106 95 - 110 mmol/L    CO2 25 23 - 29 mmol/L    Glucose 123 (H) 70 - 110 mg/dL    BUN, Bld 6 6 - 20 mg/dL    Creatinine 0.6 0.5 - 1.4 mg/dL    Calcium 8.4 (L) 8.7 - 10.5 mg/dL    Total Protein 6.4 6.0 - 8.4 g/dL    Albumin 3.0 (L) 3.5 - 5.2 g/dL    Total Bilirubin 6.6 (H) 0.1 - 1.0 mg/dL    Alkaline Phosphatase 166 (H) 55 - 135 U/L    AST 73 (H) 10 - 40 U/L    ALT 39 10 - 44 U/L    Anion Gap 6 (L) 8 - 16 mmol/L    eGFR if African American >60.0 >60 mL/min/1.73 m^2    eGFR if non African American >60.0 >60 mL/min/1.73 m^2   Magnesium    Collection Time: 01/24/17  6:30 AM   Result Value Ref Range    Magnesium 2.0 1.6 - 2.6 mg/dL   PT/INR    Collection Time: 01/24/17  6:30 AM   Result Value Ref Range    Prothrombin Time 15.0 (H) 9.0 - 12.5 sec    INR 1.5 (H) 0.8 - 1.2   Vitamin B1    Collection Time: 01/24/17  6:30 AM   Result Value Ref Range    Thiamine 40 38 - 122 ug/L   CBC auto differential    Collection Time: 01/24/17  8:06 AM   Result Value Ref Range    WBC 4.86 3.90 - 12.70 K/uL    RBC 2.92 (L) 4.00 - 5.40 M/uL    Hemoglobin 9.6 (L) 12.0 - 16.0 g/dL    Hematocrit 28.0 (L) 37.0 - 48.5 %    MCV 96 82 - 98 fL    MCH 32.9 (H) 27.0 - 31.0 pg    MCHC 34.3 32.0 - 36.0 %    RDW 16.1 (H) 11.5 - 14.5 %    Platelets 55 (L) 150 - 350 K/uL    MPV 9.7 9.2 - 12.9 fL    Gran # 3.3 1.8 - 7.7 K/uL    Lymph # 1.0 1.0 - 4.8 K/uL    Mono # 0.4 0.3 - 1.0 K/uL    Eos # 0.1 0.0 - 0.5 K/uL    Baso # 0.01 0.00 - 0.20 K/uL    Gran% 67.5 38.0 - 73.0 %    Lymph% 21.0 18.0 - 48.0 %    Mono% 8.4 4.0 - 15.0 %    Eosinophil% 2.7 0.0 - 8.0 %    Basophil% 0.2 0.0 - 1.9 %    Differential Method Automated    CBC auto differential    Collection Time: 01/24/17  3:31 PM   Result Value Ref Range    WBC 5.75 3.90 - 12.70 K/uL    RBC 2.92 (L) 4.00 - 5.40 M/uL    Hemoglobin 9.5 (L) 12.0 - 16.0 g/dL    Hematocrit 28.3 (L) 37.0 - 48.5 %     MCV 97 82 - 98 fL    MCH 32.5 (H) 27.0 - 31.0 pg    MCHC 33.6 32.0 - 36.0 %    RDW 16.3 (H) 11.5 - 14.5 %    Platelets 63 (L) 150 - 350 K/uL    MPV 9.6 9.2 - 12.9 fL    Gran # 3.7 1.8 - 7.7 K/uL    Lymph # 1.4 1.0 - 4.8 K/uL    Mono # 0.4 0.3 - 1.0 K/uL    Eos # 0.2 0.0 - 0.5 K/uL    Baso # 0.02 0.00 - 0.20 K/uL    Gran% 65.1 38.0 - 73.0 %    Lymph% 24.5 18.0 - 48.0 %    Mono% 7.3 4.0 - 15.0 %    Eosinophil% 2.6 0.0 - 8.0 %    Basophil% 0.3 0.0 - 1.9 %    Differential Method Automated    CBC auto differential    Collection Time: 01/25/17 12:15 AM   Result Value Ref Range    WBC 4.54 3.90 - 12.70 K/uL    RBC 2.67 (L) 4.00 - 5.40 M/uL    Hemoglobin 8.7 (L) 12.0 - 16.0 g/dL    Hematocrit 25.2 (L) 37.0 - 48.5 %    MCV 94 82 - 98 fL    MCH 32.6 (H) 27.0 - 31.0 pg    MCHC 34.5 32.0 - 36.0 %    RDW 16.4 (H) 11.5 - 14.5 %    Platelets 56 (L) 150 - 350 K/uL    MPV 10.2 9.2 - 12.9 fL    Gran # 2.7 1.8 - 7.7 K/uL    Lymph # 1.3 1.0 - 4.8 K/uL    Mono # 0.4 0.3 - 1.0 K/uL    Eos # 0.1 0.0 - 0.5 K/uL    Baso # 0.01 0.00 - 0.20 K/uL    Gran% 59.7 38.0 - 73.0 %    Lymph% 29.3 18.0 - 48.0 %    Mono% 8.4 4.0 - 15.0 %    Eosinophil% 2.2 0.0 - 8.0 %    Basophil% 0.2 0.0 - 1.9 %    Differential Method Automated    Comprehensive Metabolic Panel (CMP)    Collection Time: 01/25/17  4:28 AM   Result Value Ref Range    Sodium 138 136 - 145 mmol/L    Potassium 4.3 3.5 - 5.1 mmol/L    Chloride 107 95 - 110 mmol/L    CO2 24 23 - 29 mmol/L    Glucose 94 70 - 110 mg/dL    BUN, Bld 8 6 - 20 mg/dL    Creatinine 0.6 0.5 - 1.4 mg/dL    Calcium 8.5 (L) 8.7 - 10.5 mg/dL    Total Protein 5.8 (L) 6.0 - 8.4 g/dL    Albumin 2.6 (L) 3.5 - 5.2 g/dL    Total Bilirubin 4.9 (H) 0.1 - 1.0 mg/dL    Alkaline Phosphatase 164 (H) 55 - 135 U/L    AST 64 (H) 10 - 40 U/L    ALT 35 10 - 44 U/L    Anion Gap 7 (L) 8 - 16 mmol/L    eGFR if African American >60.0 >60 mL/min/1.73 m^2    eGFR if non African American >60.0 >60 mL/min/1.73 m^2   Magnesium    Collection Time:  "01/25/17  4:28 AM   Result Value Ref Range    Magnesium 1.6 1.6 - 2.6 mg/dL   PT/INR    Collection Time: 01/25/17  4:28 AM   Result Value Ref Range    Prothrombin Time 16.2 (H) 9.0 - 12.5 sec    INR 1.6 (H) 0.8 - 1.2   CBC auto differential    Collection Time: 01/25/17  8:06 AM   Result Value Ref Range    WBC 4.05 3.90 - 12.70 K/uL    RBC 2.93 (L) 4.00 - 5.40 M/uL    Hemoglobin 9.6 (L) 12.0 - 16.0 g/dL    Hematocrit 28.3 (L) 37.0 - 48.5 %    MCV 97 82 - 98 fL    MCH 32.8 (H) 27.0 - 31.0 pg    MCHC 33.9 32.0 - 36.0 %    RDW 16.1 (H) 11.5 - 14.5 %    Platelets 57 (L) 150 - 350 K/uL    MPV 9.9 9.2 - 12.9 fL    Gran # 2.5 1.8 - 7.7 K/uL    Lymph # 1.1 1.0 - 4.8 K/uL    Mono # 0.3 0.3 - 1.0 K/uL    Eos # 0.1 0.0 - 0.5 K/uL    Baso # 0.02 0.00 - 0.20 K/uL    Gran% 62.4 38.0 - 73.0 %    Lymph% 27.4 18.0 - 48.0 %    Mono% 7.2 4.0 - 15.0 %    Eosinophil% 2.5 0.0 - 8.0 %    Basophil% 0.5 0.0 - 1.9 %    Differential Method Automated         Mental Status Exam:   Arousal: awake, lying in bed  Appearance: jaundiced, disheveled, excoriations around mouth  Sensorium/Orientation: AOx3  Grooming: poor  Behavior/Cooperation: cooperative, good eye contact  Psychomotor: no PMA/PMR  Speech: normal rate, volume, tone   Language: fluent English  Mood: "great"   Affect: constricted, improving  Thought Process: tangential  Thought Content: denies SI/HI/AVH  Associations:no loose associations  Attention/Concentration: passed SAVEAHAART  Memory: impaired  Fund of Knowledge: intact   Insight: impaired, improving  Judgment: impaired    ASSESSMENT/PLAN:     Hepatic Encephalopathy (R/O Wernicke's)   Bipolar Disorder I, unspecified   Alcohol Use Disorder, severe  Benzodiazepine Use Disorder, unspecified   Benzodiazepine/Alcohol Withdrawal     Recommendations:   - Pt is preparing for transfer to [facility] today for liver transplant workup  - Continue withdrawal precautions, monitor vital every 4 hours. She was prescribed klonopin 2mg po 4x day prior " to entering the hospital and has h/o alcohol dependence.   - Continue scheduled Ativan taper with prn Ativan 2mg po every 4 hours prn systolic blood pressure > 160, Diastolic Blood pressure >110, heart rate > 110, tremors, diaphoresis, or other signs of withdrawal.  - Continue Lithium 300mg po QHS, she was unable to tolerate higher doses in the past.   - EKG 1/20/17 showed improved QTc @ 490 (compared to QTc 503 on 1/15/17). Continue scheduled Risperdal 0.5mg mtabs po QHS while awaiting transplant in the event of encephalopathy. It will also serve to regulate sleep/wake cycle and manage psychosis during delirium. This can also be used as a duel mood stabilizer. She has tolerated this medication in the past.   Its important to insure the pt remains compliant w lactulose to reduce encephalopathy from elevated ammonia.  - Multivitamin and Folate qdaily   - Continue Thiamine supplementation via IV      Brannon E Wiedemann, PGY4  Psychiatry

## 2017-01-25 NOTE — ASSESSMENT & PLAN NOTE
- Could be secondary to her chronic liver disease and hypersplenism  - off of dvt ppx as the patient has a oral pharyngeal bleed.   Recent Labs  Lab 01/24/17  1531 01/25/17  0015 01/25/17  0806   WBC 5.75 4.54 4.05   HGB 9.5* 8.7* 9.6*   HCT 28.3* 25.2* 28.3*   PLT 63* 56* 57*

## 2017-01-25 NOTE — PROGRESS NOTES
[facility]  Adult Nutrition  Consult Note    SUMMARY     Recommendations    Recommendation/Intervention: 1. Cont current diet/OS order as it is appropriate to meet pt's EEN/EPN  Goals: Maintain meal intake >/=75%  Nutrition Goal Status: goal met  Communication of RD Recs: reviewed with RN    Continuum of Care Plan    Referral to Outpatient Services: other (see comments) (Nutr D/c Plan: d/c on Regular diet + OS)    Reason for Assessment    Reason for Assessment: RD follow-up  Diagnosis: liver disease, other (see comments) (hepatic encephalopathy 2/2 ESLD)  Relevent Medical History: ESLD 2/2 EtOH abuse, Bipolar   Interdisciplinary Rounds: did not attend     General Information Comments: Pt possibly to tx to [facility]; unknown when. Pt able to answer most of my questions appropriately today; reports she cont's to eat well & drink Boost OS daily; documented meal intake over past 48hrs has mostly been 100%; ate 50% of breakfast today    Nutrition Prescription Ordered    Current Diet Order: Regular              Oral Nutrition Supplement: Boost Plus TID     Evaluation of Received Nutrients/Fluid Intake        Energy Calories Required: meeting needs                 Protein Required: meeting needs           Other Fluid (mL):  (IVPB meds)      Fluid Required: meeting needs  Comments: LBM 1/22  Tolerance: tolerating     Nutrition Risk Screen     Nutrition Risk Screen: no indicators present    Nutrition/Diet History    Patient Reported Diet/Restrictions/Preferences: general  Typical Food/Fluid Intake: adequate  Food Preferences: No Caodaism/cultural prefs reported     Supplemental Drinks or Food Habits: Boost Plus (pta per prior nutr eval note)  Factors Affecting Nutritional Intake: impaired cognitive status/motor control                Labs/Tests/Procedures/Meds       Pertinent Labs Reviewed: reviewed  Pertinent Labs Comments: nickie wnl, tbili 4.9  Pertinent Medications Reviewed: reviewed  Pertinent  Medications Comments: folic acid, lactulose, lithium, thiamine, zinc, risperidone    Physical Findings    Overall Physical Appearance: generalized wasting        Skin: intact, jaundice, other (see comments) (Francisco 20)    Anthropometrics       Height (inches): 62.99 in  Weight Method: Bed Scale  Weight (kg): 49.4 kg (no new wt since RD's last eval)     Ideal Body Weight (IBW), Female: 114.95 lb     % Ideal Body Weight, Female (lb): 94.75 lb  BMI (kg/m2): 19.3  BMI Grade: 18.5-24.9 - normal  Usual Body Weight (UBW), k kg  % Usual Body Weight: 110.85     Weight Loss: other (see comments) (none noted per prior notes)        Estimated/Assessed Needs    Weight Used For Calorie Calculations: 49.4 kg (108 lb 14.5 oz)   Height (cm): 160 cm     Energy Need Method: Crittenden-St Jeor (x 1.4 = 1520 cals daily )     RMR (Crittenden-St. Jeor Equation): 1086.51        Weight Used For Protein Calculations: 49.4 kg (108 lb 14.5 oz)  Protein Requirements: 59-74 gms (1.2-1.5 gms/kg)    Fluid Need Method: RDA Method (1ml/kcal or per MD)        Malnutrition (Undernutrition) Diagnosis    % Intake of Estimated Energy Needs: 75 - 100%  % Meal Intake: Other (comment) (%)  Malnutrition Reason: chronic, illness related        Nutrition Diagnosis    Nutrition Problem: Inadequate energy intake  Etiology/Related To: multiple factors (decreased appetite, AMS)  Nutrition Diagnosis Signs/Symptoms As Evidenced By: % meals consumed  Nutrition Diagnosis Status: Improving    Monitor and Evaluation    Food and Nutrient Intake: energy intake, food and beverage intake  Food and Nutrient Adminstration: diet order        Anthropometric Measurements: weight, weight change  Biochemical Data, Medical Tests and Procedures: glucose/endocrine profile, electrolyte and renal panel, gastrointestinal profile  Nutrition-Focused Physical Findings: overall appearance    Nutrition Risk    Level of Risk: low    Nutrition Follow-Up    RD Follow-up?: Yes

## 2017-01-25 NOTE — PLAN OF CARE
MARINA called transfer center speaking with Sara Kirkland for update on pt transfer to [facility] stating she spoke with [facility] transfer center, who states MARINA Marc was questioning the transfer of the pt.

## 2017-01-25 NOTE — PLAN OF CARE
CM called Florentino Cotter, leaving voicemail to return call in regards to pt pending authorization status for transfer to [facility].  Awaiting return call.

## 2017-01-25 NOTE — ASSESSMENT & PLAN NOTE
- No clear history of alcohol use  - Alcohol level is < 10  - Thiamin level is normal  - Increase thiamine to 500 mg PO daily.

## 2017-01-25 NOTE — SUBJECTIVE & OBJECTIVE
Interval History:     Over night, she was doing stable no acute events happened overnight, denies any chest pain, SOB. Seems still to have mild encephalopathy, but slowly improved.     Review of Systems   Constitutional: Positive for fatigue. Negative for activity change, appetite change, chills, diaphoresis and fever.   HENT: Negative for congestion and dental problem.    Eyes: Negative for photophobia, pain, discharge and redness.   Respiratory: Negative for apnea, cough, choking and shortness of breath.    Cardiovascular: Negative for chest pain, palpitations and leg swelling.   Gastrointestinal: Negative for abdominal distention, abdominal pain, anal bleeding and blood in stool.   Genitourinary: Negative for difficulty urinating, dysuria, flank pain and frequency.   Musculoskeletal: Negative for back pain and gait problem.   Skin: Positive for color change and pallor.   Neurological: Negative for dizziness, seizures, facial asymmetry and headaches.   Hematological: Does not bruise/bleed easily.     Objective:     Vital Signs (Most Recent):  Temp: 98 °F (36.7 °C) (01/25/17 0800)  Pulse: 78 (01/25/17 0800)  Resp: 20 (01/25/17 0800)  BP: (!) 121/59 (01/25/17 0800)  SpO2: 98 % (01/25/17 0800) Vital Signs (24h Range):  Temp:  [97.9 °F (36.6 °C)-98.1 °F (36.7 °C)] 98 °F (36.7 °C)  Pulse:  [78-94] 78  Resp:  [18-20] 20  SpO2:  [95 %-99 %] 98 %  BP: (106-121)/(55-72) 121/59     Weight: 49.4 kg (109 lb)  Body mass index is 19.31 kg/(m^2).    Intake/Output Summary (Last 24 hours) at 01/25/17 1336  Last data filed at 01/25/17 1000   Gross per 24 hour   Intake              840 ml   Output              301 ml   Net              539 ml      Physical Exam   Constitutional: She appears well-developed. No distress.   HENT:   Sclerae of face and conjunctivae    Eyes: Right eye exhibits no discharge. Left eye exhibits no discharge. Scleral icterus is present.   Neck: Normal range of motion. Neck supple. No JVD present. No  thyromegaly present.   Cardiovascular: Normal rate and regular rhythm.  Exam reveals no friction rub.    No murmur heard.  Pulmonary/Chest: Effort normal and breath sounds normal. No respiratory distress. She has no wheezes. She has no rales.   Abdominal: Soft. Bowel sounds are normal. She exhibits no distension. There is no tenderness.   Negative for hepatic asterixis.    Neurological: No cranial nerve deficit. Coordination normal.   conscious but only oriented to person and place.    Skin: She is not diaphoretic.   Vitals reviewed.      Recent Labs  Lab 01/24/17  1531 01/25/17  0015 01/25/17  0806   WBC 5.75 4.54 4.05   HGB 9.5* 8.7* 9.6*   HCT 28.3* 25.2* 28.3*   PLT 63* 56* 57*       Recent Labs  Lab 01/23/17  0555 01/24/17  0630 01/25/17  0428    137 138   K 4.0 3.8 4.3    106 107   CO2 25 25 24   BUN 8 6 8   CREATININE 0.6 0.6 0.6   CALCIUM 8.4* 8.4* 8.5*   MG 1.4* 2.0 1.6        Recent Labs  Lab 01/23/17  0555 01/24/17  0630 01/25/17  0428   ALBUMIN 2.8* 3.0* 2.6*   PROT 6.2 6.4 5.8*   BILITOT 9.4* 6.6* 4.9*   ALKPHOS 121 166* 164*   ALT 35 39 35   AST 65* 73* 64*

## 2017-01-25 NOTE — PLAN OF CARE
CM in to see pt this am.  Psychiatry at pt bedside seeing pt.  Pt informed her sister, Zaria, is flying in today from [place].  Pt informed of transfer to [facility] hold up pending insurance authorization.  Pt very pleasant more oriented.  Will continue to follow for transfer process.

## 2017-01-25 NOTE — PLAN OF CARE
MARINA placed several calls to f/u on the status of the transfer to [facility].    CM spoke with Pat in the C.     1156: CM called [facility]'s [facility] to /.    1159[phone] Per Tari, this is a lateral move, they need to see if the patient will meet IP criteria, they haven't received insurance auth, and if the insurance doesn't approve, the pt may be responsible for the bill. MARINA asked if they submitted for auth from [employer] and she said yes.   MARINA exchanged phone numbers with Tari and our CM team will give her a call back after speaking with the MD, pt/fmly, and our [employer] contact.    1213: Informed team MARINA.

## 2017-01-25 NOTE — PLAN OF CARE
CM called Transfer Center for update on status of pt transfer to [facility].  Spoke with Sara Kirkland in transfer center stating awaiting approval from [facility].

## 2017-01-25 NOTE — ASSESSMENT & PLAN NOTE
- Non compliance history of her lactulose.   - Ammonia at presentation was 50   - complicated by her administration of Klonopin.   - Hepatitis panel and HIV negative  - Delirium precaution  Daytime: awake, up in chair as tolerated, tv on, window shades up, frequent reorientation.  Nighttime: in bed, minimize interruptions, tv off, window shades down.  - PETH negative   - Hepatology will decide for transplant work up with patient and her sister and they tried to reach her sister and communicate regarding liver transplant workup. If failed to reach sister will communicate mother. If no transplant workup will be initiated, palliative consult is imperative.   - Started on long term vitamin treatment.  - Hepatology recommended to start zinc sulfate capsule 220 mg, and still finding out the decision for work her up for liver transplant.    MELD-Na score: 18 at 1/25/2017  4:28 AM  MELD score: 18 at 1/25/2017  4:28 AM  Calculated from:  Serum Creatinine: 0.6 mg/dL (Rounded to 1) at 1/25/2017  4:28 AM  Serum Sodium: 138 mmol/L (Rounded to 137) at 1/25/2017  4:28 AM  Total Bilirubin: 4.9 mg/dL at 1/25/2017  4:28 AM  INR(ratio): 1.6 at 1/25/2017  4:28 AM  Age: 51 years

## 2017-01-25 NOTE — ASSESSMENT & PLAN NOTE
- Hospitalized for crystal and psychosis x 2; also has had depression (total length 4) (duration 3 wks and 1.5 weeks with the others)  - On lithium daily and follow with her psychiatric  - Lithium level is normal  Resumed Lithium   - Psychiatry recommended to start patient on Risperidone QHS  - Will slowly taper her from Benzo if we are going to start work her up for transplant

## 2017-01-25 NOTE — NURSING
Patient more alert today than previous shift. Pt able to tell me that she is being discharged tomorrow to go to [facility] so another doctor can look at her liver. Will continue to monitor pt throughout the shift.

## 2017-01-25 NOTE — PLAN OF CARE
CM received call from Cyndee Good CM, stating pt has authorization for transfer to [facility].  Dr. Sawyer and CM at pt bedside speaking with pt, mother, and sister Zaria in regards to the transfer being approved.  All in agreement.  Transfer Center called and notified of authorization of transfer from [facility].  [facility] service notified of transfer needed pt placed in will call.

## 2017-01-25 NOTE — PLAN OF CARE
CM called Alia Feliciano, leaving voicemail to return call on pending authorization of pt transfer to [facility].  Awaiting return call.

## 2017-01-26 VITALS
DIASTOLIC BLOOD PRESSURE: 63 MMHG | WEIGHT: 109 LBS | HEIGHT: 63 IN | SYSTOLIC BLOOD PRESSURE: 136 MMHG | OXYGEN SATURATION: 96 % | HEART RATE: 96 BPM | TEMPERATURE: 99 F | BODY MASS INDEX: 19.31 KG/M2 | RESPIRATION RATE: 18 BRPM

## 2017-01-26 LAB
ALBUMIN SERPL BCP-MCNC: 3 G/DL
ALP SERPL-CCNC: 178 U/L
ALT SERPL W/O P-5'-P-CCNC: 36 U/L
ANION GAP SERPL CALC-SCNC: 6 MMOL/L
AST SERPL-CCNC: 72 U/L
BASOPHILS # BLD AUTO: 0.01 K/UL
BASOPHILS # BLD AUTO: 0.02 K/UL
BASOPHILS # BLD AUTO: 0.03 K/UL
BASOPHILS NFR BLD: 0.2 %
BASOPHILS NFR BLD: 0.4 %
BASOPHILS NFR BLD: 0.7 %
BILIRUB SERPL-MCNC: 5.5 MG/DL
BUN SERPL-MCNC: 6 MG/DL
CALCIUM SERPL-MCNC: 9 MG/DL
CHLORIDE SERPL-SCNC: 107 MMOL/L
CO2 SERPL-SCNC: 23 MMOL/L
CREAT SERPL-MCNC: 0.6 MG/DL
DIFFERENTIAL METHOD: ABNORMAL
EOSINOPHIL # BLD AUTO: 0.1 K/UL
EOSINOPHIL NFR BLD: 2 %
EOSINOPHIL NFR BLD: 2.3 %
EOSINOPHIL NFR BLD: 2.4 %
ERYTHROCYTE [DISTWIDTH] IN BLOOD BY AUTOMATED COUNT: 16.4 %
ERYTHROCYTE [DISTWIDTH] IN BLOOD BY AUTOMATED COUNT: 16.4 %
ERYTHROCYTE [DISTWIDTH] IN BLOOD BY AUTOMATED COUNT: 16.6 %
EST. GFR  (AFRICAN AMERICAN): >60 ML/MIN/1.73 M^2
EST. GFR  (NON AFRICAN AMERICAN): >60 ML/MIN/1.73 M^2
GLUCOSE SERPL-MCNC: 95 MG/DL
HCT VFR BLD AUTO: 26.5 %
HCT VFR BLD AUTO: 27.3 %
HCT VFR BLD AUTO: 29.3 %
HGB BLD-MCNC: 9 G/DL
HGB BLD-MCNC: 9.5 G/DL
HGB BLD-MCNC: 9.9 G/DL
INR PPP: 1.5
LYMPHOCYTES # BLD AUTO: 0.9 K/UL
LYMPHOCYTES # BLD AUTO: 1.2 K/UL
LYMPHOCYTES # BLD AUTO: 1.4 K/UL
LYMPHOCYTES NFR BLD: 21.9 %
LYMPHOCYTES NFR BLD: 23.8 %
LYMPHOCYTES NFR BLD: 31.6 %
MAGNESIUM SERPL-MCNC: 1.6 MG/DL
MCH RBC QN AUTO: 33 PG
MCH RBC QN AUTO: 33.3 PG
MCH RBC QN AUTO: 33.9 PG
MCHC RBC AUTO-ENTMCNC: 33.8 %
MCHC RBC AUTO-ENTMCNC: 34 %
MCHC RBC AUTO-ENTMCNC: 34.8 %
MCV RBC AUTO: 97 FL
MCV RBC AUTO: 98 FL
MCV RBC AUTO: 99 FL
MONOCYTES # BLD AUTO: 0.3 K/UL
MONOCYTES # BLD AUTO: 0.3 K/UL
MONOCYTES # BLD AUTO: 0.4 K/UL
MONOCYTES NFR BLD: 6.6 %
MONOCYTES NFR BLD: 7.1 %
MONOCYTES NFR BLD: 7.5 %
NEUTROPHILS # BLD AUTO: 2.6 K/UL
NEUTROPHILS # BLD AUTO: 2.8 K/UL
NEUTROPHILS # BLD AUTO: 3.5 K/UL
NEUTROPHILS NFR BLD: 57.6 %
NEUTROPHILS NFR BLD: 66 %
NEUTROPHILS NFR BLD: 69.3 %
PLATELET # BLD AUTO: 57 K/UL
PLATELET # BLD AUTO: 62 K/UL
PLATELET # BLD AUTO: 62 K/UL
PMV BLD AUTO: 9.5 FL
PMV BLD AUTO: 9.6 FL
PMV BLD AUTO: 9.7 FL
POTASSIUM SERPL-SCNC: 4.6 MMOL/L
PROT SERPL-MCNC: 6.5 G/DL
PROTHROMBIN TIME: 15.4 SEC
RBC # BLD AUTO: 2.73 M/UL
RBC # BLD AUTO: 2.8 M/UL
RBC # BLD AUTO: 2.97 M/UL
SODIUM SERPL-SCNC: 136 MMOL/L
WBC # BLD AUTO: 4.07 K/UL
WBC # BLD AUTO: 4.56 K/UL
WBC # BLD AUTO: 5.22 K/UL

## 2017-01-26 PROCEDURE — 85025 COMPLETE CBC W/AUTO DIFF WBC: CPT

## 2017-01-26 PROCEDURE — 25000003 PHARM REV CODE 250: Performed by: STUDENT IN AN ORGANIZED HEALTH CARE EDUCATION/TRAINING PROGRAM

## 2017-01-26 PROCEDURE — 85610 PROTHROMBIN TIME: CPT

## 2017-01-26 PROCEDURE — 25000003 PHARM REV CODE 250: Performed by: INTERNAL MEDICINE

## 2017-01-26 PROCEDURE — 25000003 PHARM REV CODE 250: Performed by: HOSPITALIST

## 2017-01-26 PROCEDURE — 36415 COLL VENOUS BLD VENIPUNCTURE: CPT

## 2017-01-26 PROCEDURE — 97535 SELF CARE MNGMENT TRAINING: CPT

## 2017-01-26 PROCEDURE — 80053 COMPREHEN METABOLIC PANEL: CPT

## 2017-01-26 PROCEDURE — 99238 HOSP IP/OBS DSCHRG MGMT 30/<: CPT | Mod: GC,,, | Performed by: HOSPITALIST

## 2017-01-26 PROCEDURE — 83735 ASSAY OF MAGNESIUM: CPT

## 2017-01-26 RX ORDER — PILOCARPINE HYDROCHLORIDE 10 MG/ML
1 SOLUTION/ DROPS OPHTHALMIC 3 TIMES DAILY
Status: DISCONTINUED | OUTPATIENT
Start: 2017-01-26 | End: 2017-01-26 | Stop reason: HOSPADM

## 2017-01-26 RX ORDER — THIAMINE HCL 500 MG
500 TABLET ORAL DAILY
Start: 2017-01-26

## 2017-01-26 RX ORDER — DOXYLAMINE SUCCINATE 25 MG
TABLET ORAL 2 TIMES DAILY
Refills: 0 | COMMUNITY
Start: 2017-01-26

## 2017-01-26 RX ORDER — LACTULOSE 10 G/15ML
15 SOLUTION ORAL 3 TIMES DAILY
Qty: 675 ML | Refills: 0
Start: 2017-01-26 | End: 2017-02-05

## 2017-01-26 RX ORDER — LANOLIN ALCOHOL/MO/W.PET/CERES
100 CREAM (GRAM) TOPICAL DAILY
COMMUNITY
Start: 2017-01-26

## 2017-01-26 RX ORDER — LORAZEPAM 1 MG/1
1 TABLET ORAL 2 TIMES DAILY
Status: DISCONTINUED | OUTPATIENT
Start: 2017-01-26 | End: 2017-01-26 | Stop reason: HOSPADM

## 2017-01-26 RX ORDER — LORAZEPAM 1 MG/1
TABLET ORAL
Qty: 90 TABLET | Refills: 0
Start: 2017-01-26

## 2017-01-26 RX ORDER — RISPERIDONE 1 MG/ML
0.5 SOLUTION ORAL NIGHTLY
Qty: 15 ML | Refills: 0
Start: 2017-01-26 | End: 2018-01-26

## 2017-01-26 RX ORDER — ZINC SULFATE 50(220)MG
220 CAPSULE ORAL DAILY
COMMUNITY
Start: 2017-01-26

## 2017-01-26 RX ORDER — LANOLIN ALCOHOL/MO/W.PET/CERES
100 CREAM (GRAM) TOPICAL DAILY
Status: CANCELLED | COMMUNITY
Start: 2017-01-26

## 2017-01-26 RX ADMIN — LORAZEPAM 1 MG: 1 TABLET ORAL at 05:01

## 2017-01-26 RX ADMIN — RIFAXIMIN 550 MG: 550 TABLET ORAL at 08:01

## 2017-01-26 RX ADMIN — PILOCARPINE HYDROCHLORIDE 1 DROP: 10 SOLUTION/ DROPS OPHTHALMIC at 01:01

## 2017-01-26 RX ADMIN — LACTULOSE 15 G: 10 SOLUTION ORAL at 05:01

## 2017-01-26 RX ADMIN — Medication 500 MG: at 08:01

## 2017-01-26 RX ADMIN — MICONAZOLE NITRATE: 20 CREAM TOPICAL at 08:01

## 2017-01-26 RX ADMIN — FOLIC ACID 1000 MCG: 1 TABLET ORAL at 08:01

## 2017-01-26 RX ADMIN — RISPERIDONE 0.5 MG: 1 SOLUTION ORAL at 12:01

## 2017-01-26 RX ADMIN — Medication 220 MG: at 08:01

## 2017-01-26 NOTE — PLAN OF CARE
MARINA spoke with pt sister, Zaria, informing I have been in touch with Dr. Grove office to continue to facilitate transfer of the pt and awaiting return call from NEHAL Quarles.

## 2017-01-26 NOTE — PROGRESS NOTES
"[facility] Hospital Medicine  Progress Note    Patient Name: Marely Hamilton  MRN: [id number]  Patient Class: IP- Inpatient   Admission Date: 1/15/2017  Length of Stay: 11 days  Attending Physician: Blessing Cherry MD  Primary Care Provider: Viktor Ross MD    [facility] Medicine Team: [facility] 5 Adin Sawyer MD    Subjective:     Principal Problem:Hepatic encephalopathy    HPI:  Marely Hamilton, 51 year female known to have Liver Cirrhosis and ESLD secondary to alcohol abuse, Bipolar disorder she was brought to ED by her mother after she noticed increasing confusion and skin discoloration of several days duration prior to admission.    When hospital medicine evaluated the patient at bedside, she was by herself and not accompanied by anyone, patient was not good historian and she couldn't reply the questions they were asking. Primary team called the mother Joy Oconnor [phone] and asked questions about her current presentation. So based on mothers' conversation over the phone she claimed that she was non complaint with her medication especially Lactulose and she did not keep track of her bowel movement on daily basis. Mother also mentioned that she was having some change in her basal mentation and when you ask her question and doesn't answer question and taking about her sister and cats and "doesn't make sense". Patient lives with her mother. Her mother noticed change in her face color and eyes color in the last couple of weeks more noticeably in the last couple of days. Mother denies if she observed any fever chills, or symptoms systemic infections and lethargy. Not clear when she drinks the last alcohol drink.            Hospital Course:  Patient admitted to hospital medicine and treated for hepatic encephalopathy - per notes, pt improved on PO lactulose and evaluated by liver transplant and psychiatry. ICU consulted on 1/21/17 after pt had 1 episode of what was thought to be " hematemesis but later upon inspection was a bleed from mouth trauma secondary to a seizure.     01/22/17 - Patient was scheduled to get ativan, but seized before administration of ativan this morning. The patient was treated with ativan 2 mg IV once and started on 1 mg q6 hours. 1 unit of FFP ordered for her oral pharyngeal bleed. Mouth packed. The patient is currently being considered for hepatic transplantation. If she does not elect for this then palliation should be considered.        Interval History:     Over the night, the patient has no complain, no active event happened and change in the treatment plan occurred. Patient denies chest pain, SOB, difficulty in breathing, and had 3 bowel movement and has limited ambulating.     Review of Systems   Constitutional: Positive for fatigue. Negative for activity change, appetite change, chills, diaphoresis and fever.   HENT: Negative for congestion and dental problem.    Eyes: Negative for photophobia, pain, discharge and redness.   Respiratory: Negative for apnea, cough, choking and shortness of breath.    Cardiovascular: Negative for chest pain, palpitations and leg swelling.   Gastrointestinal: Negative for abdominal distention, abdominal pain, anal bleeding and blood in stool.   Genitourinary: Negative for difficulty urinating, dysuria, flank pain and frequency.   Musculoskeletal: Negative for back pain and gait problem.   Skin: Positive for color change and pallor.   Neurological: Negative for dizziness, seizures, facial asymmetry and headaches.   Hematological: Does not bruise/bleed easily.     Objective:     Vital Signs (Most Recent):  Temp: 98.1 °F (36.7 °C) (01/26/17 0400)  Pulse: 84 (01/26/17 0400)  Resp: 18 (01/26/17 0400)  BP: 121/68 (01/26/17 0400)  SpO2: 96 % (01/26/17 0400) Vital Signs (24h Range):  Temp:  [98 °F (36.7 °C)-98.5 °F (36.9 °C)] 98.1 °F (36.7 °C)  Pulse:  [78-93] 84  Resp:  [18-20] 18  SpO2:  [96 %-98 %] 96 %  BP: (121-132)/(59-72) 121/68      Weight: 49.4 kg (109 lb)  Body mass index is 19.31 kg/(m^2).    Intake/Output Summary (Last 24 hours) at 01/26/17 0730  Last data filed at 01/25/17 1800   Gross per 24 hour   Intake              600 ml   Output                0 ml   Net              600 ml      Physical Exam   Constitutional: She appears well-developed. No distress.   HENT:   Sclerae of face and conjunctivae    Eyes: Right eye exhibits no discharge. Left eye exhibits no discharge. Scleral icterus is present.   Neck: Normal range of motion. Neck supple. No JVD present. No thyromegaly present.   Cardiovascular: Normal rate and regular rhythm.  Exam reveals no friction rub.    No murmur heard.  Pulmonary/Chest: Effort normal and breath sounds normal. No respiratory distress. She has no wheezes. She has no rales.   Abdominal: Soft. Bowel sounds are normal. She exhibits no distension. There is no tenderness.   Negative for hepatic asterixis.    Neurological: No cranial nerve deficit. Coordination normal.   conscious but only oriented to person and place.    Skin: She is not diaphoretic.   Vitals reviewed.      Recent Labs  Lab 01/25/17  0806 01/25/17  1543 01/26/17  0015   WBC 4.05 4.90 4.56   HGB 9.6* 10.1* 9.0*   HCT 28.3* 30.5* 26.5*   PLT 57* 62* 57*       Recent Labs  Lab 01/24/17  0630 01/25/17  0428 01/26/17  0428    138 136   K 3.8 4.3 4.6    107 107   CO2 25 24 23   BUN 6 8 6   CREATININE 0.6 0.6 0.6   CALCIUM 8.4* 8.5* 9.0   MG 2.0 1.6 1.6      Recent Labs  Lab 01/24/17  0630 01/25/17  0428 01/26/17  0428   ALBUMIN 3.0* 2.6* 3.0*   PROT 6.4 5.8* 6.5   BILITOT 6.6* 4.9* 5.5*   ALKPHOS 166* 164* 178*   ALT 39 35 36   AST 73* 64* 72*         Assessment/Plan:      * Hepatic encephalopathy  - Non compliance history of her lactulose.   - Ammonia at presentation was 50   - complicated by her administration of Klonopin.   - Hepatitis panel and HIV negative  - Delirium precaution  Daytime: awake, up in chair as tolerated, tv on, window  shades up, frequent reorientation.  Nighttime: in bed, minimize interruptions, tv off, window shades down.  - PETH negative   - Hepatology will decide for transplant work up with patient and her sister and they tried to reach her sister and communicate regarding liver transplant workup. If failed to reach sister will communicate mother. If no transplant workup will be initiated, palliative consult is imperative.   - Started on long term vitamin treatment.  - Hepatology recommended to start zinc sulfate capsule 220 mg, and still finding out the decision for work her up for liver transplant.   - Suppose to have transfer to [facility]    MELD-Na score: 18 at 1/26/2017  4:28 AM  MELD score: 17 at 1/26/2017  4:28 AM  Calculated from:  Serum Creatinine: 0.6 mg/dL (Rounded to 1) at 1/26/2017  4:28 AM  Serum Sodium: 136 mmol/L at 1/26/2017  4:28 AM  Total Bilirubin: 5.5 mg/dL at 1/26/2017  4:28 AM  INR(ratio): 1.5 at 1/26/2017  4:28 AM  Age: 51 years      Cirrhosis, Laennec's  - Cirrhosis secondary to alcohol abuse  - Review principal problem for more elaboration    Thrombocytopenia  - Could be secondary to her chronic liver disease and hypersplenism  - off of dvt ppx as the patient has a oral pharyngeal bleed.     Recent Labs  Lab 01/25/17  0806 01/25/17  1543 01/26/17  0015   WBC 4.05 4.90 4.56   HGB 9.6* 10.1* 9.0*   HCT 28.3* 30.5* 26.5*   PLT 57* 62* 57*       Chronic liver failure  - Review principal problem for more elaboration    Bipolar disorder in remission  - Hospitalized for crystal and psychosis x 2; also has had depression (total length 4) (duration 3 wks and 1.5 weeks with the others)  - On lithium daily and follow with her psychiatric  - Lithium level is normal  Resumed Lithium   - Psychiatry recommended to start patient on Risperidone QHS  - Will slowly taper her from Benzo if we are going to start work her up for transplant     Benzodiazepine withdrawal with complication  - Giving now 1 mg ativan BID (was TID  yesterday)   - Seized before getting ativan dosage this morning  - PRN's in place.     History of seizure  - Happened once, bit her tongue and had some mild bleeding through mouth  - Could be withdrawal symptoms,  - No recurrence.     Anemia    Recent Labs  Lab 01/25/17  0806 01/25/17  1543 01/26/17  0015   HGB 9.6* 10.1* 9.0*   HCT 28.3* 30.5* 26.5*   - secondary to chronic liver disease and potentially nutritional deficiencies       Alcohol dependence in remission  - No clear history of alcohol use  - Alcohol level is < 10  - Thiamin level is normal  - Increase thiamine to 500 mg PO daily.     Malnutrition  - Will consult dietary for their input.     Alcohol use disorder  - Review above for more elaboration       Sedative, hypnotic or anxiolytic use, unspecified with withdrawal with perceptual disturbances        Hematemesis, resolved as of 1/22/2017        UGIB (upper gastrointestinal bleed), resolved as of 1/22/2017        Seizure in response to acute event, resolved as of 1/23/2017  See problem with benzo withdrawal.       VTE Risk Mitigation         Ordered     Medium Risk of VTE  Once      01/15/17 1926          Adin Sawyer MD  Department of Hospital Medicine   [facility]

## 2017-01-26 NOTE — PLAN OF CARE
CM received call from NEHAL Quarles, Dr. Grove nurse stating the Director and  of [facility] met and has approved the transfer of the pt to [facility].  States there is a bed available and they will be calling shortly with bed assignment for report to be called.  Awaiting bed assignment.  Pt and sister informed of approved transfer as well as floor RN.

## 2017-01-26 NOTE — PLAN OF CARE
Problem: Patient Care Overview  Goal: Plan of Care Review  Outcome: Ongoing (interventions implemented as appropriate)  No acute events throughout shift. VS and assessment performed per orders. Pt A&Ox3, calm and cooperative.  Plan of care reviewed with pt, all concerns addressed. Will continue to monitor

## 2017-01-26 NOTE — ASSESSMENT & PLAN NOTE
- Non compliance history of her lactulose.   - Ammonia at presentation was 50   - complicated by her administration of Klonopin.   - Hepatitis panel and HIV negative  - Delirium precaution  Daytime: awake, up in chair as tolerated, tv on, window shades up, frequent reorientation.  Nighttime: in bed, minimize interruptions, tv off, window shades down.  - PETH negative   - Hepatology will decide for transplant work up with patient and her sister and they tried to reach her sister and communicate regarding liver transplant workup. If failed to reach sister will communicate mother. If no transplant workup will be initiated, palliative consult is imperative.   - Started on long term vitamin treatment.  - Hepatology recommended to start zinc sulfate capsule 220 mg, and still finding out the decision for work her up for liver transplant.   - Suppose to have transfer to [facility]    MELD-Na score: 18 at 1/26/2017  4:28 AM  MELD score: 17 at 1/26/2017  4:28 AM  Calculated from:  Serum Creatinine: 0.6 mg/dL (Rounded to 1) at 1/26/2017  4:28 AM  Serum Sodium: 136 mmol/L at 1/26/2017  4:28 AM  Total Bilirubin: 5.5 mg/dL at 1/26/2017  4:28 AM  INR(ratio): 1.5 at 1/26/2017  4:28 AM  Age: 51 years

## 2017-01-26 NOTE — ASSESSMENT & PLAN NOTE
Recent Labs  Lab 01/25/17  0806 01/25/17  1543 01/26/17  0015   HGB 9.6* 10.1* 9.0*   HCT 28.3* 30.5* 26.5*   - secondary to chronic liver disease and potentially nutritional deficiencies

## 2017-01-26 NOTE — PLAN OF CARE
Problem: Occupational Therapy Goal  Goal: Occupational Therapy Goal  Goals to be met by: 2/19/17     Patient will increase functional independence with ADLs by performing:    UE Dressing with [place].  LE Dressing with [place].  Grooming while standing at sink with [place].  Toileting from toilet with [place] for hygiene and clothing management.   Bathing from edge of bed with [place].  Toilet transfer to toilet with [place].  Upper extremity exercise program x15 reps per handout, with independence.  Pt will be Ox4.  Pt will state what to do in an emergency situation c 100% accuracy.  Pt will follow simple multi-step commands c 100% accuracy.  Pt will demonstrate good STM/LTM c 100% accuracy.   Outcome: Ongoing (interventions implemented as appropriate)  Pt. Is progressing towards goals.

## 2017-01-26 NOTE — PT/OT/SLP PROGRESS
Occupational Therapy  Treatment    Marely Hamilton   MRN: [id number]   Admitting Diagnosis: Hepatic encephalopathy    OT Date of Treatment: 17   OT Start Time: 1039  OT Stop Time: 1103  OT Total Time (min): 24 min    Billable Minutes:  Self Care/Home Management 24    General Precautions: Standard, fall, seizure  Orthopedic Precautions: N/A  Braces: N/A    Do you have any cultural, spiritual, Anabaptist conflicts, given your current situation?: None    Subjective:  I am supposed to go to [facility] tomorrow.   Communicated with nurse prior to session.      Pain Ratin/10        Location: head  Pain Addressed: Reposition, Distraction  Pain Rating Post-Intervention:  (no increases in pain noted)    Objective:  Patient found with:  (supine in bed)     Functional Mobility:  Bed Mobility:  Rolling/Turning to Left: Supervision  Rolling/Turning Right: Supervision  Supine to Sit: Supervision    Transfers:   Sit <> Stand Assistance: Stand By Assistance  Sit <> Stand Assistive Device: No Assistive Device    Functional Ambulation: Pt. Performed functional mobility to and from the bathroom x 2 trials without an AD and SBA/CGA    Activities of Daily Living:     UE Dressing Level of Assistance: Minimum assistance (to don gown like robe)  LE Dressing Level of Assistance: Stand by assistance (to don socks)  Grooming Position: Standing at sink  Grooming Level of Assistance: Contact guard assistance (to brush teeth and wash face)                Balance:   Static Sit: GOOD: Takes MODERATE challenges from all directions  Dynamic Sit: GOOD: Maintains balance through MODERATE excursions of active trunk movement  Static Stand: SBA  Dynamic stand: CGA      AM-PAC 6 CLICK ADL   How much help from another person does this patient currently need?   1 = Unable, Total/Dependent Assistance  2 = A lot, Maximum/Moderate Assistance  3 = A little, Minimum/Contact Guard/Supervision  4 = None, Modified [place]/Independent    Putting on and taking  off regular lower body clothing? : 3  Bathing (including washing, rinsing, drying)?: 3  Toileting, which includes using toilet, bedpan, or urinal? : 3  Putting on and taking off regular upper body clothing?: 3  Taking care of personal grooming such as brushing teeth?: 3  Eating meals?: 4  Total Score: 19     AM-PAC Raw Score CMS G-Code Modifier Level of Impairment Assistance   6 % Total / Unable   7 - 9 CM 80 - 100% Maximal Assist   10 - 14 CL 60 - 80% Moderate Assist   15 - 19 CK 40 - 60% Moderate Assist   20 - 22 CJ 20 - 40% Minimal Assist   23 CI 1-20% SBA / CGA   24 CH 0% Independent/ Mod I     Patient left supine with call button in reach    ASSESSMENT:  Marely Hamilton is a 51 y.o. female with a medical diagnosis of Hepatic encephalopathy and presents with deficits with higher level ADL tasks particularly those involving standing and would benefit from continued OT services to  maximize level of independence and safety with ADL tasks.    Rehab identified problem list/impairments: Rehab identified problem list/impairments: impaired endurance, impaired self care skills, impaired functional mobilty, gait instability    Rehab potential is good.    Activity tolerance: Good    Discharge recommendations: Discharge Facility/Level Of Care Needs: home health OT (with supervision/light assist)     Barriers to discharge:      Equipment recommendations: none     GOALS:   Occupational Therapy Goals        Problem: Occupational Therapy Goal    Goal Priority Disciplines Outcome Interventions   Occupational Therapy Goal     OT, PT/OT Ongoing (interventions implemented as appropriate)    Description:  Goals to be met by: 2/19/17     Patient will increase functional independence with ADLs by performing:    UE Dressing with [place].  LE Dressing with [place].  Grooming while standing at sink with [place].  Toileting from toilet with [place] for hygiene and clothing management.   Bathing from  edge of  bed with [place].  Toilet transfer to toilet with [place].  Upper extremity exercise program x15 reps per handout, with independence.  Pt will be Ox4.  Pt will state what to do in an emergency situation c 100% accuracy.  Pt will follow simple multi-step commands c 100% accuracy.  Pt will demonstrate good STM/LTM c 100% accuracy.                Plan:  Patient to be seen 5 x/week to address the above listed problems via self-care/home management, therapeutic activities, therapeutic exercises  Plan of Care expires:    Plan of Care reviewed with: patient         BALBIR Willis  01/26/2017

## 2017-01-26 NOTE — PLAN OF CARE
MARINA called Dr. Colt Grove's office speaking with NEHAL Quarles to inform Dr. Grove his pt had been denied the transfer to his facility, [facility], for liver workup possible transplant as of 3:52pm 1/25/17.  Robina states she was aware and would speak with Dr. Grove on this matter and get back with me as soon as possible on the transfer.  Robina made aware the transfer was accepted by Dr. Radha Anguiano, Hospitalist, on Wednesday, 1/24/17, 3pm.  We were pending [employer] authorization for the transfer on Wednesday 3pm.  1/25/17 CM received call from Cyndee Good CM, authorization the transfer.  [facility] transfer [facility] was notified and 3:52pm and returned call to  stating the transfer had been declined per Joe Marc CM, and  On Call.  Telma, coordinator of [facility], called per MARINA requesting reason for decline seeing that we had accepting physician and authorization from [facility] stating she could give no further information.  MARINA informed pt and family, Zaria her sister, of the decline and would continue to work on the transfer with Dr. Grove today.

## 2017-01-26 NOTE — PLAN OF CARE
Pt sister, Zaria, called CM asking for update on transfer status to [place].  CM informed Zaria several calls in to NEHAL Quarles of Dr. Grove notifying of no bed assignment called in since  approval from [place] at 12:30pm.  Informed Zaria that my manager was going to make some calls himself to try to escalate this transfer.  Zaria states she will wait until 5pm if no word she will take her sister, the pt, out of this hospital and bring her to [place] ED for evaluation and admission.

## 2017-01-26 NOTE — ASSESSMENT & PLAN NOTE
- Could be secondary to her chronic liver disease and hypersplenism  - off of dvt ppx as the patient has a oral pharyngeal bleed.     Recent Labs  Lab 01/25/17  0806 01/25/17  1543 01/26/17  0015   WBC 4.05 4.90 4.56   HGB 9.6* 10.1* 9.0*   HCT 28.3* 30.5* 26.5*   PLT 57* 62* 57*

## 2017-01-26 NOTE — SUBJECTIVE & OBJECTIVE
Interval History:     Over the night, the patient has no complain, no active event happened and change in the treatment plan occurred. Patient denies chest pain, SOB, difficulty in breathing, and had 3 bowel movement and has limited ambulating.     Review of Systems   Constitutional: Positive for fatigue. Negative for activity change, appetite change, chills, diaphoresis and fever.   HENT: Negative for congestion and dental problem.    Eyes: Negative for photophobia, pain, discharge and redness.   Respiratory: Negative for apnea, cough, choking and shortness of breath.    Cardiovascular: Negative for chest pain, palpitations and leg swelling.   Gastrointestinal: Negative for abdominal distention, abdominal pain, anal bleeding and blood in stool.   Genitourinary: Negative for difficulty urinating, dysuria, flank pain and frequency.   Musculoskeletal: Negative for back pain and gait problem.   Skin: Positive for color change and pallor.   Neurological: Negative for dizziness, seizures, facial asymmetry and headaches.   Hematological: Does not bruise/bleed easily.     Objective:     Vital Signs (Most Recent):  Temp: 98.1 °F (36.7 °C) (01/26/17 0400)  Pulse: 84 (01/26/17 0400)  Resp: 18 (01/26/17 0400)  BP: 121/68 (01/26/17 0400)  SpO2: 96 % (01/26/17 0400) Vital Signs (24h Range):  Temp:  [98 °F (36.7 °C)-98.5 °F (36.9 °C)] 98.1 °F (36.7 °C)  Pulse:  [78-93] 84  Resp:  [18-20] 18  SpO2:  [96 %-98 %] 96 %  BP: (121-132)/(59-72) 121/68     Weight: 49.4 kg (109 lb)  Body mass index is 19.31 kg/(m^2).    Intake/Output Summary (Last 24 hours) at 01/26/17 0730  Last data filed at 01/25/17 1800   Gross per 24 hour   Intake              600 ml   Output                0 ml   Net              600 ml      Physical Exam   Constitutional: She appears well-developed. No distress.   HENT:   Sclerae of face and conjunctivae    Eyes: Right eye exhibits no discharge. Left eye exhibits no discharge. Scleral icterus is present.   Neck:  Normal range of motion. Neck supple. No JVD present. No thyromegaly present.   Cardiovascular: Normal rate and regular rhythm.  Exam reveals no friction rub.    No murmur heard.  Pulmonary/Chest: Effort normal and breath sounds normal. No respiratory distress. She has no wheezes. She has no rales.   Abdominal: Soft. Bowel sounds are normal. She exhibits no distension. There is no tenderness.   Negative for hepatic asterixis.    Neurological: No cranial nerve deficit. Coordination normal.   conscious but only oriented to person and place.    Skin: She is not diaphoretic.   Vitals reviewed.      Recent Labs  Lab 01/25/17  0806 01/25/17  1543 01/26/17  0015   WBC 4.05 4.90 4.56   HGB 9.6* 10.1* 9.0*   HCT 28.3* 30.5* 26.5*   PLT 57* 62* 57*       Recent Labs  Lab 01/24/17  0630 01/25/17  0428 01/26/17  0428    138 136   K 3.8 4.3 4.6    107 107   CO2 25 24 23   BUN 6 8 6   CREATININE 0.6 0.6 0.6   CALCIUM 8.4* 8.5* 9.0   MG 2.0 1.6 1.6      Recent Labs  Lab 01/24/17  0630 01/25/17  0428 01/26/17  0428   ALBUMIN 3.0* 2.6* 3.0*   PROT 6.4 5.8* 6.5   BILITOT 6.6* 4.9* 5.5*   ALKPHOS 166* 164* 178*   ALT 39 35 36   AST 73* 64* 72*

## 2017-01-26 NOTE — PLAN OF CARE
MARINA spoke with Oswaldo, 11th floor RN, updating on transfer informing hospital medicine md at [facility] to speak with IM5 team then bed assignment will be called to him to give report.  Oswaldo informed pt in will call with [facility] for transfer and given [id number] reference # for pt with [facility].  Transfer center called and spoke with Sara Kirkland updating her on the progress with the transfer.  MARINA called Zaria, sister, and informed of progress as well.

## 2017-01-26 NOTE — PLAN OF CARE
MARINA called [facility] center speaking with [facility] in regards to bed assignment for pt transfer to [facility].  Heaven states she will place a call to [facility] for the bed assignment and call me back.  Awaiting return call.

## 2017-01-27 NOTE — DISCHARGE SUMMARY
"DISCHARGE SUMMARY  Hospital Medicine    Team: [facility] [place]    Patient Name: Marely Hamilton  YOB: 1965    Admit Date: 1/15/2017    Discharge Date: 01/27/2017    Discharge Attending Physician: No att. 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]  Yes     Priority: 2     Thrombocytopenia [D69.6]  Yes     Priority: 2     Benzodiazepine withdrawal with complication [F13.239]  No     Priority: 3     Bipolar disorder in remission [F31.70]  Yes     Priority: 3      Hospitalized for crystal and psychosis x 2; also has had depression (total length 4) (duration 3 wks and 1.5 weeks with the others)      History of seizure [Z87.898]  Not Applicable     Priority: 4      One seizure 2013- "low blood sugar from a starvation diet"      Alcohol dependence in remission [F10.21]  Yes     Priority: 5     Anemia [D64.9]  Yes     Priority: 5      6 units PRBC since June 2015      Malnutrition [E46]  Yes     Priority: 6     Alcohol use disorder [F10.99]  Yes    Sedative, hypnotic or anxiolytic use, 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     HOSPITAL COURSE:    Initial Presentation:     This is Ms. Marely Hamilton, 51 year female known to have Liver Cirrhosis and ESLD secondary to alcohol abuse, Bipolar disorder she was brought to ED by her mother after she noticed that she " "was confused and her skin was discolor for the last couple of days. When we evaluate the patient at bedside, she was by herself and not accompanied by anyone, patient was not good historian and she couldn't reply the question we were asking. So I called the mother Joy Oconnor [phone] and ask questions about current presentation. So based on mother conversation over the phone she claimed that she was non complaint with her medication especially Lactulose and she did not keep track of her bowel movement on daily basis. Mother also mention that she was having some change in her basal mentation and when you ask her question and doesn't answer question and taking about her sister and cats and "doesn't make sense". Patient lives with her mother. Her mother noticed change in her face color and eyes color in the last couple of weeks more noticeably in the last couple of days. Mother denies if she observed any fever chills, or symptoms systemic infections and lethargy. No clear when she drinks the last alcohol drink.     Course of Principle Problem for Admission:    Encephalopathy    Differential diagnoses included but not limited to hepatic encephalopathy due to noncompliance with lactulose, adverse effect of benzos (UTox +benzos; clonazepam on home med list). Resumed on her lactulose to have better bowel movement, it showed slight improvement in her mentation in the next couple of days, but not return to her baseline. Head CT scan were negative for any acute insult to her brain, EEG showed Probably normal asleep and drowsy EEG with rhythmic mid temporal theta activity, which may signify drowsiness. If suspicion for an   epileptiform disturbance is high, consider repeat or prolonged EEG monitoring in the future. Psychiatry involved and they recommend slow tapering of her Benzodiazepine given her presumptive positive in her urine when she presents to ED. They recommend involving neurology for further workup. Patient " still having confabulation and still doesn't return to her baseline mental status. Recommend to continue her workup from neurological point of view.     Work up for Liver Transplant in Alcoholic Cirrhosis     Hepatology services were consulted and they recommended continuing lactulose with better frequency of bowel movement, her bowel movement were reached target and her MELD score were slightly elevated but stayed the same for the last 3-4 days prior discharge. - PETH negative. Ammonia on presentation showed 50, - Hepatitis panel and HIV negative. Hepatology recommend transfer of care to [facility] to resume her transplant work up. If patient could not continue transplant work up in patient, recommend to continue working her up as outpatient.  MELD-Na score: 18 at 1/26/2017  4:28 AM  MELD score: 17 at 1/26/2017  4:28 AM  Calculated from:  Serum Creatinine: 0.6 mg/dL (Rounded to 1) at 1/26/2017  4:28 AM  Serum Sodium: 136 mmol/L at 1/26/2017  4:28 AM  Total Bilirubin: 5.5 mg/dL at 1/26/2017  4:28 AM  INR(ratio): 1.5 at 1/26/2017  4:28 AM  Age: 51 years    Other Medical Problems Addressed in the Hospital:    Thrombocytopenia and anemia - Could be secondary to her chronic liver disease and hypersplenism.    Recent Labs  Lab 01/26/17  0015 01/26/17  0818 01/26/17  1629   WBC 4.56 4.07 5.22   HGB 9.0* 9.9* 9.5*   HCT 26.5* 29.3* 27.3*   PLT 57* 62* 62*     Chronic liver failure  - Review principal problem for more elaboration     Bipolar disorder in remission  - Hospitalized for crystal and psychosis x 2; also has had depression (total length 4) (duration 3 wks and 1.5 weeks with the others)  - On lithium daily and follow with her psychiatric  - Lithium level is normal Resumed Lithium   - Psychiatry recommended to start patient on Risperidone QHS for delirium and improve her sleep awake cycle       Alcohol dependence in remission-  No clear history of alcohol use Alcohol level is < 10 Started on thiamine daily and will  continue after discharge.    CONSULTS: Hepatology, Psychiatry     Pertinent/Significant Diagnostic Studies:  Head CT scan and EEG.     Disposition:  [facility] for In patient Liver Transplant Work up    Future Scheduled Appointments:  No future appointments.    Last CBC/BMP/HgbA1c (if applicable):  Recent Results (from the past 336 hour(s))   CBC auto differential    Collection Time: 01/26/17  4:29 PM   Result Value Ref Range    WBC 5.22 3.90 - 12.70 K/uL    Hemoglobin 9.5 (L) 12.0 - 16.0 g/dL    Hematocrit 27.3 (L) 37.0 - 48.5 %    Platelets 62 (L) 150 - 350 K/uL   CBC auto differential    Collection Time: 01/26/17  8:18 AM   Result Value Ref Range    WBC 4.07 3.90 - 12.70 K/uL    Hemoglobin 9.9 (L) 12.0 - 16.0 g/dL    Hematocrit 29.3 (L) 37.0 - 48.5 %    Platelets 62 (L) 150 - 350 K/uL   CBC auto differential    Collection Time: 01/26/17 12:15 AM   Result Value Ref Range    WBC 4.56 3.90 - 12.70 K/uL    Hemoglobin 9.0 (L) 12.0 - 16.0 g/dL    Hematocrit 26.5 (L) 37.0 - 48.5 %    Platelets 57 (L) 150 - 350 K/uL     No results found for this or any previous visit (from the past 336 hour(s)).  No results found for: HGBA1C    Discharge Medication List:     Medication List      START taking these medications          lactulose 20 gram/30 mL Soln   Commonly known as:  CHRONULAC   Take 22.5 mLs (15 g total) by mouth 3 (three) times daily.       lorazepam 1 MG tablet   Commonly known as:  ATIVAN   Take 1 tab PO every 8 hours for 2 days, then 1 tab every 12 hours for 2 days, then 1 tab daily for 2 days then STOP.       miconazole 2 % cream   Commonly known as:  MICOTIN   Apply topically 2 (two) times daily. Inguinal area around buttocks       rifAXIMin 550 mg Tab   Commonly known as:  XIFAXAN   Take 1 tablet (550 mg total) by mouth 2 (two) times daily.       risperidone 1 mg/ml 1 mg/mL Soln   Commonly known as:  RISPERDAL   Take 0.5 mLs (0.5 mg total) by mouth every evening.       thiamine 100 MG tablet   Take 1 tablet (100 mg  total) by mouth once daily.       zinc sulfate 220 (50) mg capsule   Commonly known as:  ZINCATE   Take 1 capsule (220 mg total) by mouth once daily.         CONTINUE taking these medications          COMBIGAN 0.2-0.5 % Drop   Generic drug:  brimonidine-timolol       folic acid 1 MG tablet   Commonly known as:  FOLVITE   TAKE ONE TABLET BY MOUTH EVERY DAY       lithium 300 MG capsule   Commonly known as:  ESKALITH       ondansetron 4 MG tablet   Commonly known as:  ZOFRAN       ONE DAILY MULTIVITAMIN per tablet   Generic drug:  multivitamin       pantoprazole 40 MG tablet   Commonly known as:  PROTONIX       pilocarpine HCL 1% 1 % ophthalmic solution   Commonly known as:  PILOCAR       vitamin D 1000 units Tab            Where to Get Your Medications      These medications were sent to [facility] - [place], [place] - [address], [place] [address]     Phone:  [phone]     rifAXIMin 550 mg Tab         You can get these medications from any pharmacy     You don't need a prescription for these medications     miconazole 2 % cream    thiamine 100 MG tablet    zinc sulfate 220 (50) mg capsule         Information about where to get these medications is not yet available     ! Ask your nurse or doctor about these medications     lactulose 20 gram/30 mL Soln    lorazepam 1 MG tablet    risperidone 1 mg/ml 1 mg/mL Soln             Patient Instructions:  No discharge procedures on file.    Signing Physician:  Adin Sawyer MD

## 2017-02-07 NOTE — PT/OT/SLP DISCHARGE
Occupational Therapy Discharge Summary    Marely Hamilton  MRN: [id number]   Hepatic encephalopathy   Patient Discharged from acute Occupational Therapy on 02-07-17.  Please refer to prior OT note dated on 01-26-17 for functional status.     Assessment:   Patient appropriate for care in another setting.  GOALS:   Occupational Therapy Goals        Problem: Occupational Therapy Goal    Goal Priority Disciplines Outcome Interventions   Occupational Therapy Goal     OT, PT/OT Ongoing (interventions implemented as appropriate)    Description:  Goals to be met by: 2/19/17     Patient will increase functional independence with ADLs by performing:    UE Dressing with Blanco. NOT MET  LE Dressing with Blanco. NOT MET  Grooming while standing at sink with Blanco.NOT MET  Toileting from toilet with Blanco for hygiene and clothing management. NOT MET  Bathing from  edge of bed with Blanco. NOT MET  Toilet transfer to toilet with Blanco.NOT MET  Upper extremity exercise program x15 reps per handout, with independence.NOT MET  Pt will be Ox4.NOT MET  Pt will state what to do in an emergency situation c 100% accuracy.NOT MET  Pt will follow simple multi-step commands c 100% accuracy.NOT MET  Pt will demonstrate good STM/LTM c 100% accuracy.Not MET              Reasons for Discontinuation of Therapy Services  Transfer to alternate level of care.      Plan:  Patient Discharged to: transfer to [facility].

## 2017-02-27 NOTE — PT/OT/SLP DISCHARGE
Physical Therapy Discharge Summary    Marely Hamilton  MRN: [id number]   Hepatic encephalopathy   Patient Discharged from acute Physical Therapy on 2017.  Please refer to prior PT noted date on 2017 for functional status.     Assessment:   Patient was discharge unexpectedly.  Information required to complete and accurate discharge summary is unknown.  Refer to therapy initial evaluation and last progress note for initial and most recent functional status and goal achievement.  Recommendations made may be found in medical record.  GOALS:   Physical Therapy Goals        Problem: Physical Therapy Goal    Goal Priority Disciplines Outcome Goal Variances Interventions   Physical Therapy Goal     PT/OT, PT      Physical Therapy Goal     PT Ongoing (interventions implemented as appropriate)     Description:  Goals to be met by: 2017     Patient will increase functional independence with mobility by performin. Supine to sit with Moderate Assistance- Met  Revised: Supine to sit with supervision   2. Sit to supine with Moderate Assistance- Met  Revised: Sit to supine with Moderate Assistance  3. Sit to stand transfer with Maximum Assistance- Met  Revised: Sit to stand transfer with supervision   4. Gait  x 10 feet with Maximum Assistance with or without appropriate AD. - Met  Revised: Gait  x 100  feet with Supervision with or without appropriate AD  5. (I) with BLE therapeutic exercises               Reasons for Discontinuation of Therapy Services  Transfer to alternate level of care.      Plan:  Patient Discharged to: transfer to [facility].     ROMY MONTAGUE, PT  2017  .

## 2019-09-26 PROBLEM — F29 PSYCHOSIS: Status: ACTIVE | Noted: 2019-09-26

## 2019-10-04 PROBLEM — F31.10 BIPOLAR AFFECTIVE DISORDER, CURRENT EPISODE MANIC: Status: ACTIVE | Noted: 2019-10-04

## 2020-05-19 PROBLEM — E63.8 INADEQUATE DIETARY INTAKE OF PROTEIN: Status: ACTIVE | Noted: 2020-05-19

## 2020-06-03 PROBLEM — R79.89 ELEVATED LFTS: Status: ACTIVE | Noted: 2020-06-03

## 2020-06-09 PROBLEM — F31.9 BIPOLAR 1 DISORDER: Status: ACTIVE | Noted: 2020-06-09

## 2020-07-29 DIAGNOSIS — F31.70 BIPOLAR DISORDER IN REMISSION: Primary | ICD-10-CM

## 2020-08-03 PROCEDURE — G0179 MD RECERTIFICATION HHA PT: HCPCS | Mod: ,,,

## 2020-08-03 PROCEDURE — G0179 PR HOME HEALTH MD RECERTIFICATION: ICD-10-PCS | Mod: ,,,

## 2020-08-25 ENCOUNTER — DOCUMENT SCAN (OUTPATIENT)
Dept: HOME HEALTH SERVICES | Facility: HOSPITAL | Age: 55
End: 2020-08-25
Payer: MEDICARE

## 2020-08-26 ENCOUNTER — EXTERNAL HOME HEALTH (OUTPATIENT)
Dept: HOME HEALTH SERVICES | Facility: HOSPITAL | Age: 55
End: 2020-08-26
Payer: MEDICARE

## 2020-11-16 ENCOUNTER — DOCUMENT SCAN (OUTPATIENT)
Dept: HOME HEALTH SERVICES | Facility: HOSPITAL | Age: 55
End: 2020-11-16
Payer: MEDICARE

## 2020-11-21 PROBLEM — G93.40 ENCEPHALOPATHY: Status: ACTIVE | Noted: 2020-11-21

## 2020-12-23 PROBLEM — F10.90 ALCOHOL USE DISORDER: Status: RESOLVED | Noted: 2017-01-23 | Resolved: 2020-12-23

## 2020-12-23 PROBLEM — F13.939 BENZODIAZEPINE WITHDRAWAL WITH COMPLICATION: Status: RESOLVED | Noted: 2017-01-22 | Resolved: 2020-12-23

## 2020-12-23 PROBLEM — F31.10 BIPOLAR AFFECTIVE DISORDER, CURRENT EPISODE MANIC: Status: RESOLVED | Noted: 2019-10-04 | Resolved: 2020-12-23

## 2020-12-23 PROBLEM — F29 PSYCHOSIS: Status: RESOLVED | Noted: 2019-09-26 | Resolved: 2020-12-23

## 2020-12-23 PROBLEM — F13.932: Status: RESOLVED | Noted: 2017-01-23 | Resolved: 2020-12-23

## 2021-01-01 PROCEDURE — G0179 MD RECERTIFICATION HHA PT: HCPCS | Mod: ,,,

## 2021-01-01 PROCEDURE — G0179 PR HOME HEALTH MD RECERTIFICATION: ICD-10-PCS | Mod: ,,,

## 2021-01-11 ENCOUNTER — EXTERNAL HOME HEALTH (OUTPATIENT)
Dept: HOME HEALTH SERVICES | Facility: HOSPITAL | Age: 56
End: 2021-01-11
Payer: MEDICARE

## 2021-01-12 ENCOUNTER — DOCUMENT SCAN (OUTPATIENT)
Dept: HOME HEALTH SERVICES | Facility: HOSPITAL | Age: 56
End: 2021-01-12
Payer: MEDICARE

## 2021-01-19 ENCOUNTER — TELEPHONE (OUTPATIENT)
Dept: ADMINISTRATIVE | Facility: OTHER | Age: 56
End: 2021-01-19

## 2021-01-19 ENCOUNTER — DOCUMENT SCAN (OUTPATIENT)
Dept: HOME HEALTH SERVICES | Facility: HOSPITAL | Age: 56
End: 2021-01-19
Payer: MEDICARE

## 2021-01-20 ENCOUNTER — TELEPHONE (OUTPATIENT)
Dept: ADMINISTRATIVE | Facility: OTHER | Age: 56
End: 2021-01-20

## 2021-01-21 PROBLEM — F31.10 BIPOLAR DISORDER, MANIC: Status: ACTIVE | Noted: 2021-01-21

## 2021-01-25 ENCOUNTER — TELEPHONE (OUTPATIENT)
Dept: UROLOGY | Facility: CLINIC | Age: 56
End: 2021-01-25

## 2021-02-19 ENCOUNTER — TELEPHONE (OUTPATIENT)
Dept: PHARMACY | Facility: CLINIC | Age: 56
End: 2021-02-19

## 2021-02-24 ENCOUNTER — TELEPHONE (OUTPATIENT)
Dept: PHARMACY | Facility: CLINIC | Age: 56
End: 2021-02-24

## 2021-04-08 ENCOUNTER — IMMUNIZATION (OUTPATIENT)
Dept: PHARMACY | Facility: CLINIC | Age: 56
End: 2021-04-08
Payer: MEDICARE

## 2021-04-08 DIAGNOSIS — Z23 NEED FOR VACCINATION: Primary | ICD-10-CM

## 2021-05-06 ENCOUNTER — IMMUNIZATION (OUTPATIENT)
Dept: PHARMACY | Facility: CLINIC | Age: 56
End: 2021-05-06
Payer: MEDICARE

## 2021-05-06 DIAGNOSIS — Z23 NEED FOR VACCINATION: Primary | ICD-10-CM

## 2021-11-03 PROBLEM — E87.6 HYPOKALEMIA: Status: ACTIVE | Noted: 2021-11-03

## 2021-11-03 PROBLEM — N39.0 URINARY TRACT INFECTION WITHOUT HEMATURIA: Status: ACTIVE | Noted: 2021-11-03

## 2021-11-05 PROBLEM — E87.6 HYPOKALEMIA: Status: RESOLVED | Noted: 2021-11-03 | Resolved: 2021-11-05

## 2021-11-05 PROBLEM — G93.40 ENCEPHALOPATHY ACUTE: Status: RESOLVED | Noted: 2020-11-21 | Resolved: 2021-11-05

## 2022-10-25 NOTE — ASSESSMENT & PLAN NOTE
- Giving now 1 mg ativan q 6 hours  - Seized before getting ativan dosage this morning  - PRN's in place.    Pleural effusion, right

## 2023-01-18 NOTE — PROGRESS NOTES
"1/22/2017 5:59 PM   Marely Hamilton   1965   [id number]        Psychiatry Progress Note     SUBJECTIVE:   Patient seen and examined on rounds today in conjunction with student doctor, our findings are integrated below:    Patient seen and examined. Pt appeared jaundiced this morning. She was awake and lying in bed. Though she was alone in the room, she believes that her mother is in the room with her and speaking with her. She spoke of being "50%" and needing to "assemble the full team." Several times, she referred to 2 magnets that were conducting a signal that tuned her into the radio. She appeared to be incorporating things that she saw around the room or on TV into her speech. She stated that she did not feel safe. She reported that she had thoughts of harming herself and that safety was her main goal. She denied any pain.       Current Medications:   Scheduled Meds:    clotrimazole  10 mg Oral TID    folic acid  1,000 mcg Oral Daily    lorazepam  1 mg Oral BID    phytonadione ((AQUA-MEPHYTON) IVPB  10 mg Intravenous Daily    thiamine  100 mg Oral Daily      PRN Meds: sodium chloride, dextrose 50%, dextrose 50%, glucagon (human recombinant), glucose, glucose, lorazepam, ondansetron, ramelteon   Psychotherapeutics     Start     Stop Route Frequency Ordered    01/15/17 2050  olanzapine injection 2.5 mg      01/15 2359 IM Once as needed 01/15/17 1950    01/18/17 1609  ramelteon tablet 8 mg      -- Oral Nightly PRN 01/18/17 1509    01/22/17 1100  lorazepam tablet 1 mg      -- Oral 2 times daily 01/22/17 0800    01/22/17 0904  lorazepam tablet 2 mg      -- Oral Every 4 hours PRN 01/22/17 0804          Allergies:   Review of patient's allergies indicates:   Allergen Reactions    Sulfa (sulfonamide antibiotics) Rash        OBJECTIVE:   Vitals   Vitals:    01/22/17 0840   BP: (!) 140/66   Pulse: 79   Resp: 16   Temp: 98.9 °F (37.2 °C)        Labs/Imaging/Studies:   Recent Results (from the past 36 hour(s)) "   POCT glucose    Collection Time: 01/21/17  4:40 AM   Result Value Ref Range    POCT Glucose 138 (H) 70 - 110 mg/dL   Comprehensive Metabolic Panel (CMP)    Collection Time: 01/21/17  5:01 AM   Result Value Ref Range    Sodium 141 136 - 145 mmol/L    Potassium 3.5 3.5 - 5.1 mmol/L    Chloride 109 95 - 110 mmol/L    CO2 16 (L) 23 - 29 mmol/L    Glucose 125 (H) 70 - 110 mg/dL    BUN, Bld 7 6 - 20 mg/dL    Creatinine 0.7 0.5 - 1.4 mg/dL    Calcium 9.2 8.7 - 10.5 mg/dL    Total Protein 7.2 6.0 - 8.4 g/dL    Albumin 3.3 (L) 3.5 - 5.2 g/dL    Total Bilirubin 7.5 (H) 0.1 - 1.0 mg/dL    Alkaline Phosphatase 235 (H) 55 - 135 U/L    AST 94 (H) 10 - 40 U/L    ALT 51 (H) 10 - 44 U/L    Anion Gap 16 8 - 16 mmol/L    eGFR if African American >60.0 >60 mL/min/1.73 m^2    eGFR if non African American >60.0 >60 mL/min/1.73 m^2   PT/INR    Collection Time: 01/21/17  5:01 AM   Result Value Ref Range    Prothrombin Time 18.5 (H) 9.0 - 12.5 sec    INR 1.8 (H) 0.8 - 1.2   Magnesium    Collection Time: 01/21/17  5:01 AM   Result Value Ref Range    Magnesium 1.6 1.6 - 2.6 mg/dL   CBC auto differential    Collection Time: 01/21/17  5:01 AM   Result Value Ref Range    WBC 6.43 3.90 - 12.70 K/uL    RBC 3.33 (L) 4.00 - 5.40 M/uL    Hemoglobin 10.9 (L) 12.0 - 16.0 g/dL    Hematocrit 32.0 (L) 37.0 - 48.5 %    MCV 96 82 - 98 fL    MCH 32.7 (H) 27.0 - 31.0 pg    MCHC 34.1 32.0 - 36.0 %    RDW 17.1 (H) 11.5 - 14.5 %    Platelets 64 (L) 150 - 350 K/uL    MPV 9.5 9.2 - 12.9 fL    Gran # 3.6 1.8 - 7.7 K/uL    Lymph # 1.9 1.0 - 4.8 K/uL    Mono # 0.6 0.3 - 1.0 K/uL    Eos # 0.3 0.0 - 0.5 K/uL    Baso # 0.03 0.00 - 0.20 K/uL    Gran% 56.0 38.0 - 73.0 %    Lymph% 29.5 18.0 - 48.0 %    Mono% 9.3 4.0 - 15.0 %    Eosinophil% 4.4 0.0 - 8.0 %    Basophil% 0.5 0.0 - 1.9 %    Differential Method Automated    Hemoglobin    Collection Time: 01/21/17  5:01 AM   Result Value Ref Range    Hemoglobin 10.9 (L) 12.0 - 16.0 g/dL   Hematocrit    Collection Time:  01/21/17  5:01 AM   Result Value Ref Range    Hematocrit 32.0 (L) 37.0 - 48.5 %   CBC auto differential    Collection Time: 01/21/17  5:01 AM   Result Value Ref Range    WBC 6.43 3.90 - 12.70 K/uL    RBC 3.33 (L) 4.00 - 5.40 M/uL    Hemoglobin 10.9 (L) 12.0 - 16.0 g/dL    Hematocrit 32.0 (L) 37.0 - 48.5 %    MCV 96 82 - 98 fL    MCH 32.7 (H) 27.0 - 31.0 pg    MCHC 34.1 32.0 - 36.0 %    RDW 17.1 (H) 11.5 - 14.5 %    Platelets 64 (L) 150 - 350 K/uL    MPV 9.5 9.2 - 12.9 fL    Gran # 3.6 1.8 - 7.7 K/uL    Lymph # 1.9 1.0 - 4.8 K/uL    Mono # 0.6 0.3 - 1.0 K/uL    Eos # 0.3 0.0 - 0.5 K/uL    Baso # 0.03 0.00 - 0.20 K/uL    Gran% 56.0 38.0 - 73.0 %    Lymph% 29.5 18.0 - 48.0 %    Mono% 9.3 4.0 - 15.0 %    Eosinophil% 4.4 0.0 - 8.0 %    Basophil% 0.5 0.0 - 1.9 %    Differential Method Automated    Comprehensive metabolic panel    Collection Time: 01/21/17  5:01 AM   Result Value Ref Range    Sodium 141 136 - 145 mmol/L    Potassium 3.5 3.5 - 5.1 mmol/L    Chloride 109 95 - 110 mmol/L    CO2 16 (L) 23 - 29 mmol/L    Glucose 125 (H) 70 - 110 mg/dL    BUN, Bld 7 6 - 20 mg/dL    Creatinine 0.7 0.5 - 1.4 mg/dL    Calcium 9.2 8.7 - 10.5 mg/dL    Total Protein 7.2 6.0 - 8.4 g/dL    Albumin 3.3 (L) 3.5 - 5.2 g/dL    Total Bilirubin 7.5 (H) 0.1 - 1.0 mg/dL    Alkaline Phosphatase 235 (H) 55 - 135 U/L    AST 94 (H) 10 - 40 U/L    ALT 51 (H) 10 - 44 U/L    Anion Gap 16 8 - 16 mmol/L    eGFR if African American >60.0 >60 mL/min/1.73 m^2    eGFR if non African American >60.0 >60 mL/min/1.73 m^2   Protime-INR    Collection Time: 01/21/17  5:01 AM   Result Value Ref Range    Prothrombin Time 18.5 (H) 9.0 - 12.5 sec    INR 1.8 (H) 0.8 - 1.2   Type & Screen    Collection Time: 01/21/17  5:16 AM   Result Value Ref Range    Group & Rh O POS     Indirect Ameya NEG    Prepare RBC 1 Unit    Collection Time: 01/21/17  5:16 AM   Result Value Ref Range    UNIT NUMBER [id number]     PRODUCT CODE Q5212B82     DISPENSE STATUS CROSSMATCHED      CODING SYSTEM [id number]     Unit Blood Type Code 5100     Unit Blood Type O POS     Unit Expiration [id number]    CBC auto differential    Collection Time: 01/21/17  9:22 AM   Result Value Ref Range    WBC 4.70 3.90 - 12.70 K/uL    RBC 3.21 (L) 4.00 - 5.40 M/uL    Hemoglobin 10.4 (L) 12.0 - 16.0 g/dL    Hematocrit 30.8 (L) 37.0 - 48.5 %    MCV 96 82 - 98 fL    MCH 32.4 (H) 27.0 - 31.0 pg    MCHC 33.8 32.0 - 36.0 %    RDW 17.2 (H) 11.5 - 14.5 %    Platelets 55 (L) 150 - 350 K/uL    MPV 10.0 9.2 - 12.9 fL    Gran # 3.3 1.8 - 7.7 K/uL    Lymph # 0.9 (L) 1.0 - 4.8 K/uL    Mono # 0.4 0.3 - 1.0 K/uL    Eos # 0.1 0.0 - 0.5 K/uL    Baso # 0.01 0.00 - 0.20 K/uL    Gran% 69.9 38.0 - 73.0 %    Lymph% 19.1 18.0 - 48.0 %    Mono% 8.5 4.0 - 15.0 %    Eosinophil% 2.1 0.0 - 8.0 %    Basophil% 0.2 0.0 - 1.9 %    Differential Method Automated    Protime-INR    Collection Time: 01/21/17  9:22 AM   Result Value Ref Range    Prothrombin Time 18.7 (H) 9.0 - 12.5 sec    INR 1.9 (H) 0.8 - 1.2   CBC auto differential    Collection Time: 01/21/17 11:41 AM   Result Value Ref Range    WBC 6.83 3.90 - 12.70 K/uL    RBC 3.15 (L) 4.00 - 5.40 M/uL    Hemoglobin 10.2 (L) 12.0 - 16.0 g/dL    Hematocrit 29.7 (L) 37.0 - 48.5 %    MCV 94 82 - 98 fL    MCH 32.4 (H) 27.0 - 31.0 pg    MCHC 34.3 32.0 - 36.0 %    RDW 17.4 (H) 11.5 - 14.5 %    Platelets 56 (L) 150 - 350 K/uL    MPV 10.1 9.2 - 12.9 fL    Gran # 4.3 1.8 - 7.7 K/uL    Lymph # 1.4 1.0 - 4.8 K/uL    Mono # 0.9 0.3 - 1.0 K/uL    Eos # 0.2 0.0 - 0.5 K/uL    Baso # 0.03 0.00 - 0.20 K/uL    Gran% 63.2 38.0 - 73.0 %    Lymph% 20.9 18.0 - 48.0 %    Mono% 12.7 4.0 - 15.0 %    Eosinophil% 2.5 0.0 - 8.0 %    Basophil% 0.4 0.0 - 1.9 %    Differential Method Automated    CBC auto differential    Collection Time: 01/21/17  3:54 PM   Result Value Ref Range    WBC 7.48 3.90 - 12.70 K/uL    RBC 3.04 (L) 4.00 - 5.40 M/uL    Hemoglobin 9.9 (L) 12.0 - 16.0 g/dL    Hematocrit 28.7 (L) 37.0 - 48.5 %    MCV 94 82 -  98 fL    MCH 32.6 (H) 27.0 - 31.0 pg    MCHC 34.5 32.0 - 36.0 %    RDW 17.3 (H) 11.5 - 14.5 %    Platelets 53 (L) 150 - 350 K/uL    MPV 9.8 9.2 - 12.9 fL    Gran # 4.6 1.8 - 7.7 K/uL    Lymph # 1.6 1.0 - 4.8 K/uL    Mono # 1.1 (H) 0.3 - 1.0 K/uL    Eos # 0.2 0.0 - 0.5 K/uL    Baso # 0.04 0.00 - 0.20 K/uL    Gran% 60.9 38.0 - 73.0 %    Lymph% 21.4 18.0 - 48.0 %    Mono% 14.3 4.0 - 15.0 %    Eosinophil% 2.5 0.0 - 8.0 %    Basophil% 0.5 0.0 - 1.9 %    Differential Method Automated    CBC auto differential    Collection Time: 01/21/17 11:46 PM   Result Value Ref Range    WBC 12.09 3.90 - 12.70 K/uL    RBC 3.16 (L) 4.00 - 5.40 M/uL    Hemoglobin 10.5 (L) 12.0 - 16.0 g/dL    Hematocrit 30.4 (L) 37.0 - 48.5 %    MCV 96 82 - 98 fL    MCH 33.2 (H) 27.0 - 31.0 pg    MCHC 34.5 32.0 - 36.0 %    RDW 16.9 (H) 11.5 - 14.5 %    Platelets 71 (L) 150 - 350 K/uL    MPV 9.8 9.2 - 12.9 fL    Gran # 8.4 (H) 1.8 - 7.7 K/uL    Lymph # 2.3 1.0 - 4.8 K/uL    Mono # 1.1 (H) 0.3 - 1.0 K/uL    Eos # 0.2 0.0 - 0.5 K/uL    Baso # 0.05 0.00 - 0.20 K/uL    Gran% 69.4 38.0 - 73.0 %    Lymph% 18.9 18.0 - 48.0 %    Mono% 9.4 4.0 - 15.0 %    Eosinophil% 1.5 0.0 - 8.0 %    Basophil% 0.4 0.0 - 1.9 %    Differential Method Automated    Comprehensive Metabolic Panel (CMP)    Collection Time: 01/22/17  6:22 AM   Result Value Ref Range    Sodium 136 136 - 145 mmol/L    Potassium 4.1 3.5 - 5.1 mmol/L    Chloride 106 95 - 110 mmol/L    CO2 23 23 - 29 mmol/L    Glucose 121 (H) 70 - 110 mg/dL    BUN, Bld 7 6 - 20 mg/dL    Creatinine 0.6 0.5 - 1.4 mg/dL    Calcium 8.4 (L) 8.7 - 10.5 mg/dL    Total Protein 6.3 6.0 - 8.4 g/dL    Albumin 2.9 (L) 3.5 - 5.2 g/dL    Total Bilirubin 10.0 (H) 0.1 - 1.0 mg/dL    Alkaline Phosphatase 138 (H) 55 - 135 U/L    AST 72 (H) 10 - 40 U/L    ALT 40 10 - 44 U/L    Anion Gap 7 (L) 8 - 16 mmol/L    eGFR if African American >60.0 >60 mL/min/1.73 m^2    eGFR if non African American >60.0 >60 mL/min/1.73 m^2   Magnesium    Collection  "Time: 01/22/17  6:22 AM   Result Value Ref Range    Magnesium 1.3 (L) 1.6 - 2.6 mg/dL   PT/INR    Collection Time: 01/22/17  6:22 AM   Result Value Ref Range    Prothrombin Time 17.1 (H) 9.0 - 12.5 sec    INR 1.7 (H) 0.8 - 1.2   CBC auto differential    Collection Time: 01/22/17  8:00 AM   Result Value Ref Range    WBC 8.02 3.90 - 12.70 K/uL    RBC 3.01 (L) 4.00 - 5.40 M/uL    Hemoglobin 9.9 (L) 12.0 - 16.0 g/dL    Hematocrit 29.0 (L) 37.0 - 48.5 %    MCV 96 82 - 98 fL    MCH 32.9 (H) 27.0 - 31.0 pg    MCHC 34.1 32.0 - 36.0 %    RDW 16.8 (H) 11.5 - 14.5 %    Platelets 58 (L) 150 - 350 K/uL    MPV 9.7 9.2 - 12.9 fL    Gran # 5.4 1.8 - 7.7 K/uL    Lymph # 1.7 1.0 - 4.8 K/uL    Mono # 0.7 0.3 - 1.0 K/uL    Eos # 0.2 0.0 - 0.5 K/uL    Baso # 0.03 0.00 - 0.20 K/uL    Gran% 67.1 38.0 - 73.0 %    Lymph% 21.1 18.0 - 48.0 %    Mono% 8.6 4.0 - 15.0 %    Eosinophil% 2.4 0.0 - 8.0 %    Basophil% 0.4 0.0 - 1.9 %    Differential Method Automated         Mental Status Exam:   Arousal: awake, lying in bed   Appearance: jaundiced, disheveled   Sensorium/Orientation: oriented to person and place, not oriented to time  Grooming: poor  Behavior/Cooperation: cooperative  Psychomotor: no PMA/PMR  Speech: normal rate, rhythm, and tone    Language: English, occasional Welsh  Mood: "ok"   Affect: blunted   Thought Process: impaired   Thought Content: endorses SI w/ no plan, denies HI, active AVH - Pt believes mother is in her room with her and is talking with her  Associations: loose associations based on objects in the room, things seen on tv, and words and images on the board in her room  Attention/Concentration: distractible  Memory: impaired  Fund of Knowledge: impaired   Insight: impaired   Judgment: impaired    ASSESSMENT/PLAN:   Bipolar Disorder type I  Alcohol Use Disorder     Recommendations:   - Pt does not meet criteria for involuntary psychiatric hospitalization; she is not suicidal, homicidal or gravely disabled. The described " symptoms she experienced prior to being hospitalized are likely due alcohol/benzo withdrawal rather than crystal as she was trying to cut back on etoh intake.  -She was prescribed klonopin 2mg po 4x day prior to entering the hospital. She is at risk of benzo withdrawal and has had episodes of tachycardia;   - Continue withdrawal precautions, monitor vital every 4 hours.  - Recommend continue scheduled Ativan 1mg Q12H Recommend Ativan 2mg po every 4 hours prn systolic blood pressure > 160, Diastolic Blood pressure >110, heart rate > 110, tremors, diaphoresis, or other signs of withdrawal.  -Recommend Lithium 300mg po QHS, she was unable to tolerate higher doses in the past.   - Patients EKG displayed QTc prolongation on 1/15/17 @ 503. Would recheck EKG, and if it is no longer prolonged (<460), consider risperdal 0.5mg po QHS to regulate sleep/wake cycle during delirium due to hepatic encephalopathy. This can also be used as a duel mood stabilizer. She has tolerated this medication in the past.   - Multivitamin, Thiamine, and Folate qdaillayla Thurman MD  [facility] / [facility] PGY2  1/22/2017  Pager: [phone]             done

## 2023-01-25 ENCOUNTER — HOSPITAL ENCOUNTER (INPATIENT)
Facility: HOSPITAL | Age: 58
LOS: 3 days | Discharge: HOME | DRG: 443 | End: 2023-01-28
Attending: EMERGENCY MEDICINE | Admitting: FAMILY MEDICINE
Payer: MEDICARE

## 2023-01-25 DIAGNOSIS — D69.6 THROMBOCYTOPENIA: ICD-10-CM

## 2023-01-25 DIAGNOSIS — R07.9 CHEST PAIN: ICD-10-CM

## 2023-01-25 DIAGNOSIS — K76.82 HEPATIC ENCEPHALOPATHY: Primary | ICD-10-CM

## 2023-01-25 DIAGNOSIS — F31.9 BIPOLAR 1 DISORDER: ICD-10-CM

## 2023-01-25 DIAGNOSIS — R41.82 ALTERED MENTAL STATUS: ICD-10-CM

## 2023-01-25 DIAGNOSIS — Z87.898 HISTORY OF SEIZURE: ICD-10-CM

## 2023-01-25 LAB
ALBUMIN SERPL BCP-MCNC: 3.1 G/DL (ref 3.5–5.2)
ALP SERPL-CCNC: 102 U/L (ref 55–135)
ALT SERPL W/O P-5'-P-CCNC: 14 U/L (ref 10–44)
AMMONIA PLAS-SCNC: 139 UMOL/L (ref 10–50)
AMPHET+METHAMPHET UR QL: NEGATIVE
ANION GAP SERPL CALC-SCNC: 3 MMOL/L (ref 8–16)
APTT BLDCRRT: 32.3 SEC (ref 21–32)
AST SERPL-CCNC: 35 U/L (ref 10–40)
BARBITURATES UR QL SCN>200 NG/ML: NEGATIVE
BASOPHILS # BLD AUTO: 0.01 K/UL (ref 0–0.2)
BASOPHILS NFR BLD: 0.4 % (ref 0–1.9)
BENZODIAZ UR QL SCN>200 NG/ML: NEGATIVE
BILIRUB SERPL-MCNC: 2.4 MG/DL (ref 0.1–1)
BILIRUB UR QL STRIP: NEGATIVE
BUN SERPL-MCNC: 15 MG/DL (ref 6–20)
BZE UR QL SCN: NEGATIVE
CALCIUM SERPL-MCNC: 9.6 MG/DL (ref 8.7–10.5)
CANNABINOIDS UR QL SCN: NEGATIVE
CHLORIDE SERPL-SCNC: 113 MMOL/L (ref 95–110)
CK SERPL-CCNC: 27 U/L (ref 20–180)
CLARITY UR: CLEAR
CO2 SERPL-SCNC: 22 MMOL/L (ref 23–29)
COLOR UR: YELLOW
CREAT SERPL-MCNC: 0.7 MG/DL (ref 0.5–1.4)
CREAT UR-MCNC: 83.2 MG/DL (ref 15–325)
CTP QC/QA: YES
DIFFERENTIAL METHOD: ABNORMAL
EOSINOPHIL # BLD AUTO: 0.1 K/UL (ref 0–0.5)
EOSINOPHIL NFR BLD: 3.3 % (ref 0–8)
ERYTHROCYTE [DISTWIDTH] IN BLOOD BY AUTOMATED COUNT: 15.6 % (ref 11.5–14.5)
EST. GFR  (NO RACE VARIABLE): >60 ML/MIN/1.73 M^2
ETHANOL SERPL-MCNC: <10 MG/DL
GLUCOSE SERPL-MCNC: 103 MG/DL (ref 70–110)
GLUCOSE UR QL STRIP: NEGATIVE
HCT VFR BLD AUTO: 36.4 % (ref 37–48.5)
HGB BLD-MCNC: 12.2 G/DL (ref 12–16)
HGB UR QL STRIP: NEGATIVE
IMM GRANULOCYTES # BLD AUTO: 0.01 K/UL (ref 0–0.04)
IMM GRANULOCYTES NFR BLD AUTO: 0.4 % (ref 0–0.5)
INR PPP: 1.5 (ref 0.8–1.2)
KETONES UR QL STRIP: NEGATIVE
LEUKOCYTE ESTERASE UR QL STRIP: NEGATIVE
LIPASE SERPL-CCNC: 187 U/L (ref 4–60)
LITHIUM SERPL-SCNC: 0.9 MMOL/L (ref 0.6–1.2)
LYMPHOCYTES # BLD AUTO: 0.8 K/UL (ref 1–4.8)
LYMPHOCYTES NFR BLD: 33.8 % (ref 18–48)
MAGNESIUM SERPL-MCNC: 2 MG/DL (ref 1.6–2.6)
MCH RBC QN AUTO: 33 PG (ref 27–31)
MCHC RBC AUTO-ENTMCNC: 33.5 G/DL (ref 32–36)
MCV RBC AUTO: 98 FL (ref 82–98)
METHADONE UR QL SCN>300 NG/ML: NEGATIVE
MONOCYTES # BLD AUTO: 0.2 K/UL (ref 0.3–1)
MONOCYTES NFR BLD: 6.7 % (ref 4–15)
NEUTROPHILS # BLD AUTO: 1.3 K/UL (ref 1.8–7.7)
NEUTROPHILS NFR BLD: 55.4 % (ref 38–73)
NITRITE UR QL STRIP: NEGATIVE
NRBC BLD-RTO: 0 /100 WBC
OPIATES UR QL SCN: NEGATIVE
PCP UR QL SCN>25 NG/ML: NEGATIVE
PH UR STRIP: 7 [PH] (ref 5–8)
PLATELET # BLD AUTO: 79 K/UL (ref 150–450)
PMV BLD AUTO: 10 FL (ref 9.2–12.9)
POC MOLECULAR INFLUENZA A AGN: NEGATIVE
POC MOLECULAR INFLUENZA B AGN: NEGATIVE
POCT GLUCOSE: 93 MG/DL (ref 70–110)
POTASSIUM SERPL-SCNC: 3.9 MMOL/L (ref 3.5–5.1)
PROT SERPL-MCNC: 6.2 G/DL (ref 6–8.4)
PROT UR QL STRIP: NEGATIVE
PROTHROMBIN TIME: 15.1 SEC (ref 9–12.5)
RBC # BLD AUTO: 3.7 M/UL (ref 4–5.4)
SODIUM SERPL-SCNC: 138 MMOL/L (ref 136–145)
SP GR UR STRIP: 1.01 (ref 1–1.03)
TOXICOLOGY INFORMATION: NORMAL
TROPONIN I SERPL DL<=0.01 NG/ML-MCNC: <0.006 NG/ML (ref 0–0.03)
TSH SERPL DL<=0.005 MIU/L-ACNC: 1.59 UIU/ML (ref 0.4–4)
URN SPEC COLLECT METH UR: ABNORMAL
UROBILINOGEN UR STRIP-ACNC: ABNORMAL EU/DL
WBC # BLD AUTO: 2.4 K/UL (ref 3.9–12.7)

## 2023-01-25 PROCEDURE — 51798 US URINE CAPACITY MEASURE: CPT

## 2023-01-25 PROCEDURE — 85610 PROTHROMBIN TIME: CPT | Performed by: EMERGENCY MEDICINE

## 2023-01-25 PROCEDURE — 80053 COMPREHEN METABOLIC PANEL: CPT | Performed by: EMERGENCY MEDICINE

## 2023-01-25 PROCEDURE — 82140 ASSAY OF AMMONIA: CPT | Performed by: EMERGENCY MEDICINE

## 2023-01-25 PROCEDURE — 84146 ASSAY OF PROLACTIN: CPT

## 2023-01-25 PROCEDURE — 85730 THROMBOPLASTIN TIME PARTIAL: CPT | Performed by: EMERGENCY MEDICINE

## 2023-01-25 PROCEDURE — 82962 GLUCOSE BLOOD TEST: CPT

## 2023-01-25 PROCEDURE — 85025 COMPLETE CBC W/AUTO DIFF WBC: CPT | Performed by: EMERGENCY MEDICINE

## 2023-01-25 PROCEDURE — 12000002 HC ACUTE/MED SURGE SEMI-PRIVATE ROOM

## 2023-01-25 PROCEDURE — 84443 ASSAY THYROID STIM HORMONE: CPT | Performed by: EMERGENCY MEDICINE

## 2023-01-25 PROCEDURE — 83735 ASSAY OF MAGNESIUM: CPT | Performed by: EMERGENCY MEDICINE

## 2023-01-25 PROCEDURE — 99285 EMERGENCY DEPT VISIT HI MDM: CPT | Mod: 25

## 2023-01-25 PROCEDURE — 82077 ASSAY SPEC XCP UR&BREATH IA: CPT | Performed by: EMERGENCY MEDICINE

## 2023-01-25 PROCEDURE — 93010 EKG 12-LEAD: ICD-10-PCS | Mod: ,,, | Performed by: INTERNAL MEDICINE

## 2023-01-25 PROCEDURE — 80307 DRUG TEST PRSMV CHEM ANLYZR: CPT | Performed by: EMERGENCY MEDICINE

## 2023-01-25 PROCEDURE — 25000003 PHARM REV CODE 250

## 2023-01-25 PROCEDURE — 93010 ELECTROCARDIOGRAM REPORT: CPT | Mod: ,,, | Performed by: INTERNAL MEDICINE

## 2023-01-25 PROCEDURE — 82550 ASSAY OF CK (CPK): CPT

## 2023-01-25 PROCEDURE — 81003 URINALYSIS AUTO W/O SCOPE: CPT | Mod: 59 | Performed by: EMERGENCY MEDICINE

## 2023-01-25 PROCEDURE — 80178 ASSAY OF LITHIUM: CPT | Performed by: EMERGENCY MEDICINE

## 2023-01-25 PROCEDURE — 63600175 PHARM REV CODE 636 W HCPCS: Performed by: EMERGENCY MEDICINE

## 2023-01-25 PROCEDURE — 25000003 PHARM REV CODE 250: Performed by: EMERGENCY MEDICINE

## 2023-01-25 PROCEDURE — 84484 ASSAY OF TROPONIN QUANT: CPT | Performed by: EMERGENCY MEDICINE

## 2023-01-25 PROCEDURE — 80177 DRUG SCRN QUAN LEVETIRACETAM: CPT | Performed by: EMERGENCY MEDICINE

## 2023-01-25 PROCEDURE — 25000003 PHARM REV CODE 250: Performed by: STUDENT IN AN ORGANIZED HEALTH CARE EDUCATION/TRAINING PROGRAM

## 2023-01-25 PROCEDURE — 83690 ASSAY OF LIPASE: CPT | Performed by: EMERGENCY MEDICINE

## 2023-01-25 PROCEDURE — 93005 ELECTROCARDIOGRAM TRACING: CPT

## 2023-01-25 PROCEDURE — 87502 INFLUENZA DNA AMP PROBE: CPT

## 2023-01-25 RX ORDER — NALOXONE HCL 0.4 MG/ML
0.02 VIAL (ML) INJECTION
Status: DISCONTINUED | OUTPATIENT
Start: 2023-01-25 | End: 2023-01-28 | Stop reason: HOSPADM

## 2023-01-25 RX ORDER — IBUPROFEN 200 MG
24 TABLET ORAL
Status: DISCONTINUED | OUTPATIENT
Start: 2023-01-25 | End: 2023-01-28 | Stop reason: HOSPADM

## 2023-01-25 RX ORDER — HYDROXYZINE HYDROCHLORIDE 25 MG/1
50 TABLET, FILM COATED ORAL NIGHTLY
Status: DISCONTINUED | OUTPATIENT
Start: 2023-01-25 | End: 2023-01-26

## 2023-01-25 RX ORDER — AMOXICILLIN 250 MG
1 CAPSULE ORAL 2 TIMES DAILY PRN
Status: DISCONTINUED | OUTPATIENT
Start: 2023-01-25 | End: 2023-01-28 | Stop reason: HOSPADM

## 2023-01-25 RX ORDER — SODIUM CHLORIDE 0.9 % (FLUSH) 0.9 %
5 SYRINGE (ML) INJECTION
Status: DISCONTINUED | OUTPATIENT
Start: 2023-01-25 | End: 2023-01-28 | Stop reason: HOSPADM

## 2023-01-25 RX ORDER — GLUCAGON 1 MG
1 KIT INJECTION
Status: DISCONTINUED | OUTPATIENT
Start: 2023-01-25 | End: 2023-01-28 | Stop reason: HOSPADM

## 2023-01-25 RX ORDER — ONDANSETRON 2 MG/ML
4 INJECTION INTRAMUSCULAR; INTRAVENOUS EVERY 8 HOURS PRN
Status: DISCONTINUED | OUTPATIENT
Start: 2023-01-25 | End: 2023-01-28 | Stop reason: HOSPADM

## 2023-01-25 RX ORDER — LACTULOSE 10 G/15ML
30 SOLUTION ORAL 2 TIMES DAILY
Status: DISCONTINUED | OUTPATIENT
Start: 2023-01-25 | End: 2023-01-25

## 2023-01-25 RX ORDER — LACTULOSE 10 G/15ML
30 SOLUTION ORAL 3 TIMES DAILY
Status: DISCONTINUED | OUTPATIENT
Start: 2023-01-25 | End: 2023-01-28 | Stop reason: HOSPADM

## 2023-01-25 RX ORDER — LACTULOSE 10 G/15ML
20 SOLUTION ORAL 2 TIMES DAILY
Status: DISCONTINUED | OUTPATIENT
Start: 2023-01-25 | End: 2023-01-25

## 2023-01-25 RX ORDER — ENOXAPARIN SODIUM 100 MG/ML
40 INJECTION SUBCUTANEOUS EVERY 24 HOURS
Status: DISCONTINUED | OUTPATIENT
Start: 2023-01-25 | End: 2023-01-25

## 2023-01-25 RX ORDER — IBUPROFEN 200 MG
16 TABLET ORAL
Status: DISCONTINUED | OUTPATIENT
Start: 2023-01-25 | End: 2023-01-28 | Stop reason: HOSPADM

## 2023-01-25 RX ORDER — TALC
6 POWDER (GRAM) TOPICAL NIGHTLY PRN
Status: DISCONTINUED | OUTPATIENT
Start: 2023-01-25 | End: 2023-01-28 | Stop reason: HOSPADM

## 2023-01-25 RX ORDER — LEVETIRACETAM 500 MG/1
500 TABLET ORAL 2 TIMES DAILY
Status: DISCONTINUED | OUTPATIENT
Start: 2023-01-25 | End: 2023-01-28 | Stop reason: HOSPADM

## 2023-01-25 RX ORDER — QUETIAPINE FUMARATE 100 MG/1
100 TABLET, FILM COATED ORAL NIGHTLY
Status: DISCONTINUED | OUTPATIENT
Start: 2023-01-25 | End: 2023-01-26

## 2023-01-25 RX ORDER — LACTULOSE 10 G/15ML
30 SOLUTION ORAL
Status: COMPLETED | OUTPATIENT
Start: 2023-01-25 | End: 2023-01-25

## 2023-01-25 RX ORDER — SPIRONOLACTONE 25 MG/1
25 TABLET ORAL DAILY
Status: DISCONTINUED | OUTPATIENT
Start: 2023-01-25 | End: 2023-01-28 | Stop reason: HOSPADM

## 2023-01-25 RX ORDER — ARIPIPRAZOLE 5 MG/1
10 TABLET ORAL DAILY
Status: DISCONTINUED | OUTPATIENT
Start: 2023-01-26 | End: 2023-01-26

## 2023-01-25 RX ORDER — AMOXICILLIN 250 MG
1 CAPSULE ORAL 2 TIMES DAILY
Status: DISCONTINUED | OUTPATIENT
Start: 2023-01-25 | End: 2023-01-25

## 2023-01-25 RX ADMIN — SPIRONOLACTONE 25 MG: 25 TABLET ORAL at 03:01

## 2023-01-25 RX ADMIN — LACTULOSE 30 G: 10 SOLUTION ORAL at 10:01

## 2023-01-25 RX ADMIN — SODIUM CHLORIDE 500 ML: 9 INJECTION, SOLUTION INTRAVENOUS at 11:01

## 2023-01-25 RX ADMIN — HYDROXYZINE HYDROCHLORIDE 50 MG: 25 TABLET ORAL at 08:01

## 2023-01-25 RX ADMIN — QUETIAPINE FUMARATE 100 MG: 100 TABLET ORAL at 08:01

## 2023-01-25 RX ADMIN — SODIUM CHLORIDE, SODIUM LACTATE, POTASSIUM CHLORIDE, AND CALCIUM CHLORIDE 1000 ML: .6; .31; .03; .02 INJECTION, SOLUTION INTRAVENOUS at 01:01

## 2023-01-25 RX ADMIN — LACTULOSE 30 G: 20 SOLUTION ORAL at 08:01

## 2023-01-25 RX ADMIN — LACTULOSE 30 G: 10 SOLUTION ORAL at 01:01

## 2023-01-25 RX ADMIN — LEVETIRACETAM 500 MG: 500 TABLET, FILM COATED ORAL at 08:01

## 2023-01-25 NOTE — HPI
56 yo F with PMH alcoholic cirrhosis, hepatic encephalopathy, seizure, bipolar disorder who presented to [facility] ED via EMS after call for AMS at patient's home. Per EMS report, patient was found with urinary incontinence and confused. Patient reports she has been feeling sick for a few days now. Denies any complaints. Unable to explain any symptoms or what medications she takes. She denies fever/chills, nausea or vomiting, abdominal pain. She denies any alcohol, drug or tobacco use. Due to patient unable to answer questions appropriately, called mother who patient lives with for collateral. She states patient is compliant with her medications and goes to  PCP. However, she states for the past few days she noticed she has been more sleepier than usual and could not communicate at her baseline. She also reports decreased appetite. She states she has been trying to keep patient out of the hospital but she often goes back in for a similar presentation. She states sister who lives in [place] was the one who called EMS for patient due to concerns.     In ED, HR 58, RR16, /60, stable on room air. Ammonia 139. Ethanol wnl. Tbili 2.4. WBC 2.40. PT/INR 15.1/1.5. CTH negative. CXR with no acute abnormality. UA and UDS in process. [facility] consulted for admission for hepatic encephalopathy vs seizure.

## 2023-01-25 NOTE — H&P
[facility] Medicine  History & Physical    Patient Name: Marely Hamilton  MRN: [id number]  Patient Class: IP- Inpatient  Admission Date: 1/25/2023  Attending Physician: Amina Herr MD   Primary Care Provider: Viktor Ross MD         Patient information was obtained from patient, parent, past medical records and ER records.     Subjective:     Principal Problem:Hepatic encephalopathy    Chief Complaint:   Chief Complaint   Patient presents with    Altered Mental Status     EMS called to residence by careworker who found patient to be disoriented, incontinent of urine, and confused. Unknown baseline, unknown last seen normal. EMS reports h/o liver disease. On arrival, awake, oriented to name only and responds to all questions by stating her name. MM dry, color pale, strong odor of urine. Resp unlabored.         HPI: 58 yo F with PMH alcoholic cirrhosis, hepatic encephalopathy, seizure, bipolar disorder who presented to [facility] ED via EMS after call for AMS at patient's home. Per EMS report, patient was found with urinary incontinence and confused. Patient reports she has been feeling sick for a few days now. Denies any complaints. Unable to explain any symptoms or what medications she takes. She denies fever/chills, nausea or vomiting, abdominal pain. She denies any alcohol, drug or tobacco use. Due to patient unable to answer questions appropriately, called mother who patient lives with for collateral. She states patient is compliant with her medications and goes to  PCP. However, she states for the past few days she noticed she has been more sleepier than usual and could not communicate at her baseline. She also reports decreased appetite. She states she has been trying to keep patient out of the hospital but she often goes back in for a similar presentation. She states sister who lives in [place] was the one who called EMS for patient due to concerns.     In ED, HR 58, RR16, BP  103/60, stable on room air. Ammonia 139. Ethanol wnl. Tbili 2.4. WBC 2.40. PT/INR 15.1/1.5. CTH negative. CXR with no acute abnormality. UA and UDS in process. [facility] consulted for admission for hepatic encephalopathy vs seizure.       Past Medical History:   Diagnosis Date    Addiction to drug     Alcohol abuse     Alcohol abuse, in remission 6/15/2015    14.5 weeks ago; AA weekly    Anemia     Anxiety 6/15/2015    Behavioral problem     Bipolar disorder     Bipolar disorder in remission 6/15/2015    Cirrhosis, Laennec's 6/15/2015    Depression     Encounter for blood transfusion     Epistaxis 6/15/2015    Fatigue     Glaucoma     Hematuria     Hepatic encephalopathy 6/15/2015    Hepatic enlargement     History of psychiatric care     History of psychiatric hospitalization     History of seizure 6/15/2015    1    hx of intentional Tylenol overdose 6/15/2015    2005- situational and hx of bipolar    Hx of psychiatric care     Jeana     Osteoarthritis     Other ascites 6/15/2015    Psychiatric exam requested by authority     Psychiatric problem     Psychosis 9/26/2019    Renal disorder     Seizures     Self-harming behavior     Suicide attempt     Therapy     Thrombocytopenia 6/15/2015       Past Surgical History:   Procedure Laterality Date    COSMETIC SURGERY      ESOPHAGOGASTRODUODENOSCOPY         Review of patient's allergies indicates:   Allergen Reactions    Sulfa (sulfonamide antibiotics) Rash    Codeine Nausea And Vomiting       No current facility-administered medications on file prior to encounter.     Current Outpatient Medications on File Prior to Encounter   Medication Sig    ARIPiprazole (ABILIFY) 20 MG Tab Take 1 tablet (20 mg total) by mouth once daily. (Patient taking differently: Take 10 mg by mouth once daily.)    camphor-menthol 0.5-0.5% (SARNA) lotion Apply topically as needed (muscle pain).    folic acid (FOLVITE) 1 MG tablet Take 1 tablet (1  mg total) by mouth once daily.    furosemide (LASIX) 20 MG tablet Take 1 tablet (20 mg total) by mouth once daily.    lactulose (CHRONULAC) 10 gram/15 mL solution Take 45 mLs (30 g total) by mouth 2 (two) times daily.    lithium (ESKALITH) 300 MG capsule Take 1 capsule (300 mg total) by mouth 2 (two) times daily. (Patient taking differently: Take 300 mg by mouth every evening.)    magnesium oxide (MAG-OX) 400 mg (241.3 mg magnesium) tablet Take 400 mg by mouth once daily. 250mg daily    multivitamin Tab Take 1 tablet by mouth once daily.    propranoloL (INDERAL) 40 MG tablet Take 40 mg by mouth once daily.    QUEtiapine (SEROQUEL) 300 MG Tab Take 1 tablet (300 mg total) by mouth nightly.    sodium bicarbonate 650 MG tablet Take 1 tablet (650 mg total) by mouth once daily.    spironolactone (ALDACTONE) 50 MG tablet Take 1 tablet (50 mg total) by mouth once daily.    zonisamide (ZONEGRAN) 100 MG Cap Take 1 capsule (100 mg total) by mouth once daily.     Family History       Problem Relation (Age of Onset)    Alcohol abuse Father, Sister, Paternal Grandfather    Bipolar disorder Father    Hypertension Mother    Suicide Father          Tobacco Use    Smoking status: Never    Smokeless tobacco: Never   Substance and Sexual Activity    Alcohol use: Not Currently     Comment: hx of ETOH abuse with cirrhosis of liver    Drug use: No    Sexual activity: Not Currently     Review of Systems   Unable to perform ROS: Mental status change   Constitutional:  Negative for chills and fever.   Respiratory:  Negative for shortness of breath.    Gastrointestinal:  Negative for abdominal pain, diarrhea, nausea and vomiting.   Neurological:  Positive for weakness. Negative for headaches.   Psychiatric/Behavioral:  Positive for confusion.    Objective:     Vital Signs (Most Recent):  Temp: 98.7 °F (37.1 °C) (01/25/23 1049)  Pulse: (!) 58 (01/25/23 1049)  Resp: 16 (01/25/23 1049)  BP: 103/60 (01/25/23 1049)  SpO2: 97 %  (01/25/23 1049)   Vital Signs (24h Range):  Temp:  [98.7 °F (37.1 °C)] 98.7 °F (37.1 °C)  Pulse:  [58] 58  Resp:  [16] 16  SpO2:  [97 %] 97 %  BP: (103)/(60) 103/60        There is no height or weight on file to calculate BMI.    Physical Exam  Vitals reviewed.   Constitutional:       General: She is awake. She is not in acute distress.     Appearance: She is not toxic-appearing.   Eyes:      Extraocular Movements: Extraocular movements intact.   Cardiovascular:      Rate and Rhythm: Regular rhythm. Bradycardia present.      Pulses: Normal pulses.      Heart sounds: Normal heart sounds.   Pulmonary:      Effort: Pulmonary effort is normal. No respiratory distress.      Breath sounds: Normal breath sounds.   Abdominal:      General: Abdomen is flat.      Palpations: Abdomen is soft.      Tenderness: There is no abdominal tenderness. There is no guarding or rebound.   Neurological:      Mental Status: She is disoriented and confused.      Cranial Nerves: Cranial nerves 2-12 are intact. No cranial nerve deficit or facial asymmetry.      Sensory: No sensory deficit.      Motor: Weakness present.      Coordination: Finger-Nose-Finger Test abnormal.      Comments: Oriented only to person, not to time or place  Asterixis present bilaterally  Able to squeeze my fingers, but decreased  strength  Bradykinesia and dysdiadochokinesia  Cranial nerves grossly intact  Able to follow simple commands   Psychiatric:         Behavior: Behavior is cooperative.           Significant Labs: All pertinent labs within the past 24 hours have been reviewed.    Significant Imaging: I have reviewed all pertinent imaging results/findings within the past 24 hours.    Assessment/Plan:     * Hepatic encephalopathy  Lactulose 30g TID history but not seen on patient's dispense report. Unsure of how long she has been off of this   Taking Spironolactone 25 mg and Propranolol 20 mg daily at home   Remote hx of Lasix, but not taking it at home    Ammonia 139 on admit  Elevated lipase. Tbili 2.4 on admit.    Plan:  -Cont lactulose, will up-titrate as needed  -Continue spironolactone 25mg daily  -Home propranolol held due to bradycardia on admit, will re-start when appropriate   -Abdominal US ordered to rule out ascites      Bipolar 1 disorder  Per medication dispense report, patient on Aripiprazole 10 mg, hydroxyzine 50 nightly, lithium 300 daily, seroquel 100 nightly   Per mother, she thinks the psych medications are the cause of her sleepiness and inability to do much during the day  Followed by Dr. Coates at   [place] level wnl on admit but has hx of lithium toxicity     Plan:  -Consult Psych to help with medication management. Appreciate recs  -Will hold Lithium for now and re-start if appropriate  -Plan to continue home meds otherwise if appropriate per Psych recs      History of seizure  Unknown patient's last seizure  Could be seizure as patient was found by EMS urinary incontinence     Plan:  -Continue home Keppra 500 BID  -Follow up Keppra level      Thrombocytopenia  79 on admit  Chronic hx of thrombocytopenia 2/2 splenomegaly in setting of alcoholic cirrhosis     Plan:  -Continue to monitor for bleeding        VTE Risk Mitigation (From admission, onward)         Ordered     IP VTE LOW RISK PATIENT  Once         01/25/23 1352     Place sequential compression device  Until discontinued         01/25/23 1352                   Radha Bang MD  [facility], PGY-1  01/25/2023

## 2023-01-25 NOTE — ED NOTES
Present to ED via EMS with AMS. EMS reports pt has been lying in the bed at home for 3 day with decrease appetite and AMS. AAO x 1

## 2023-01-25 NOTE — ED NOTES
Patient checked for incontinence. No BM. RN fed patient about 25% of meal tray. Ate without difficulty. Drank 2 cups of lemonade. MD in room rounding on patient. Patient alert to self and place but not time or situation. Intermittently following commands. Speech clear.

## 2023-01-25 NOTE — SUBJECTIVE & OBJECTIVE
Past Medical History:   Diagnosis Date    Addiction to drug     Alcohol abuse     Alcohol abuse, in remission 6/15/2015    14.5 weeks ago; AA weekly    Anemia     Anxiety 6/15/2015    Behavioral problem     Bipolar disorder     Bipolar disorder in remission 6/15/2015    Cirrhosis, Laennec's 6/15/2015    Depression     Encounter for blood transfusion     Epistaxis 6/15/2015    Fatigue     Glaucoma     Hematuria     Hepatic encephalopathy 6/15/2015    Hepatic enlargement     History of psychiatric care     History of psychiatric hospitalization     History of seizure 6/15/2015    1    hx of intentional Tylenol overdose 6/15/2015    2005- situational and hx of bipolar    Hx of psychiatric care     Jeana     Osteoarthritis     Other ascites 6/15/2015    Psychiatric exam requested by authority     Psychiatric problem     Psychosis 9/26/2019    Renal disorder     Seizures     Self-harming behavior     Suicide attempt     Therapy     Thrombocytopenia 6/15/2015       Past Surgical History:   Procedure Laterality Date    COSMETIC SURGERY      ESOPHAGOGASTRODUODENOSCOPY         Review of patient's allergies indicates:   Allergen Reactions    Sulfa (sulfonamide antibiotics) Rash    Codeine Nausea And Vomiting       No current facility-administered medications on file prior to encounter.     Current Outpatient Medications on File Prior to Encounter   Medication Sig    ARIPiprazole (ABILIFY) 20 MG Tab Take 1 tablet (20 mg total) by mouth once daily. (Patient taking differently: Take 10 mg by mouth once daily.)    camphor-menthol 0.5-0.5% (SARNA) lotion Apply topically as needed (muscle pain).    folic acid (FOLVITE) 1 MG tablet Take 1 tablet (1 mg total) by mouth once daily.    furosemide (LASIX) 20 MG tablet Take 1 tablet (20 mg total) by mouth once daily.    lactulose (CHRONULAC) 10 gram/15 mL solution Take 45 mLs (30 g total) by mouth 2 (two) times daily.    lithium (ESKALITH) 300 MG capsule Take 1 capsule (300 mg total) by  mouth 2 (two) times daily. (Patient taking differently: Take 300 mg by mouth every evening.)    magnesium oxide (MAG-OX) 400 mg (241.3 mg magnesium) tablet Take 400 mg by mouth once daily. 250mg daily    multivitamin Tab Take 1 tablet by mouth once daily.    propranoloL (INDERAL) 40 MG tablet Take 40 mg by mouth once daily.    QUEtiapine (SEROQUEL) 300 MG Tab Take 1 tablet (300 mg total) by mouth nightly.    sodium bicarbonate 650 MG tablet Take 1 tablet (650 mg total) by mouth once daily.    spironolactone (ALDACTONE) 50 MG tablet Take 1 tablet (50 mg total) by mouth once daily.    zonisamide (ZONEGRAN) 100 MG Cap Take 1 capsule (100 mg total) by mouth once daily.     Family History       Problem Relation (Age of Onset)    Alcohol abuse Father, Sister, Paternal Grandfather    Bipolar disorder Father    Hypertension Mother    Suicide Father          Tobacco Use    Smoking status: Never    Smokeless tobacco: Never   Substance and Sexual Activity    Alcohol use: Not Currently     Comment: hx of ETOH abuse with cirrhosis of liver    Drug use: No    Sexual activity: Not Currently     Review of Systems   Unable to perform ROS: Mental status change   Constitutional:  Negative for chills and fever.   Respiratory:  Negative for shortness of breath.    Gastrointestinal:  Negative for abdominal pain, diarrhea, nausea and vomiting.   Neurological:  Positive for weakness. Negative for headaches.   Psychiatric/Behavioral:  Positive for confusion.    Objective:     Vital Signs (Most Recent):  Temp: 98.7 °F (37.1 °C) (01/25/23 1049)  Pulse: (!) 58 (01/25/23 1049)  Resp: 16 (01/25/23 1049)  BP: 103/60 (01/25/23 1049)  SpO2: 97 % (01/25/23 1049)   Vital Signs (24h Range):  Temp:  [98.7 °F (37.1 °C)] 98.7 °F (37.1 °C)  Pulse:  [58] 58  Resp:  [16] 16  SpO2:  [97 %] 97 %  BP: (103)/(60) 103/60        There is no height or weight on file to calculate BMI.    Physical Exam  Vitals reviewed.   Constitutional:       General: She is awake.  She is not in acute distress.     Appearance: She is not toxic-appearing.   Eyes:      Extraocular Movements: Extraocular movements intact.   Cardiovascular:      Rate and Rhythm: Regular rhythm. Bradycardia present.      Pulses: Normal pulses.      Heart sounds: Normal heart sounds.   Pulmonary:      Effort: Pulmonary effort is normal. No respiratory distress.      Breath sounds: Normal breath sounds.   Abdominal:      General: Abdomen is flat.      Palpations: Abdomen is soft.      Tenderness: There is no abdominal tenderness. There is no guarding or rebound.   Neurological:      Mental Status: She is disoriented and confused.      Cranial Nerves: Cranial nerves 2-12 are intact. No cranial nerve deficit or facial asymmetry.      Sensory: No sensory deficit.      Motor: Weakness present.      Coordination: Finger-Nose-Finger Test abnormal.      Comments: Oriented only to person, not to time or place  Asterixis present bilaterally  Able to squeeze my fingers, but decreased  strength  Bradykinesia and dysdiadochokinesia  Cranial nerves grossly intact  Able to follow simple commands   Psychiatric:         Behavior: Behavior is cooperative.           Significant Labs: All pertinent labs within the past 24 hours have been reviewed.    Significant Imaging: I have reviewed all pertinent imaging results/findings within the past 24 hours.

## 2023-01-25 NOTE — ASSESSMENT & PLAN NOTE
Per medication dispense report, patient on Aripiprazole 10 mg, hydroxyzine 50 nightly, lithium 300 daily, seroquel 100 nightly   Per mother, she thinks the psych medications are the cause of her sleepiness and inability to do much during the day  Followed by Dr. Coates at   [facility] [facility] level wnl on admit but has hx of lithium toxicity     Plan:  -Consult Psych to help with medication management. Appreciate recs  -Will hold Lithium for now and re-start if appropriate  -Plan to continue home meds otherwise if appropriate per Psych recs

## 2023-01-25 NOTE — ASSESSMENT & PLAN NOTE
Lactulose 30g TID history but not seen on patient's dispense report. Unsure of how long she has been off of this   Taking Spironolactone 25 mg and Propranolol 20 mg daily at home   Remote hx of Lasix, but not taking it at home   Ammonia 139 on admit  Elevated lipase. Tbili 2.4 on admit.    Plan:  -Cont lactulose, will up-titrate as needed  -Continue spironolactone 25mg daily  -Home propranolol held due to bradycardia on admit, will re-start when appropriate   -Abdominal US ordered to rule out ascites

## 2023-01-25 NOTE — ED NOTES
Present to ED via EMS with AMS. EMS report caregiver discovered pt at home lying in bed confused and incontinent of urine. AAO x 1. Hx of liver disease. Pt delayed to follow command. Responds to all question by stating her name. Pt also repeats I'm sick.

## 2023-01-25 NOTE — ASSESSMENT & PLAN NOTE
80 on admit  Chronic hx of thrombocytopenia 2/2 splenomegaly in setting of alcoholic cirrhosis     Plan:  -Continue to monitor for bleeding

## 2023-01-25 NOTE — ED PROVIDER NOTES
Encounter Date: 1/25/2023       History     Chief Complaint   Patient presents with    Altered Mental Status     EMS called to residence by careworker who found patient to be disoriented, incontinent of urine, and confused. Unknown baseline, unknown last seen normal. EMS reports h/o liver disease. On arrival, awake, oriented to name only and responds to all questions by stating her name. MM dry, color pale, strong odor of urine. Resp unlabored.      57-year-old female brought to the emergency department by EMS for altered mental status.  EMS was called by someone that found the patient in this state.  No other history immediately available.    Review of patient's allergies indicates:   Allergen Reactions    Sulfa (sulfonamide antibiotics) Rash    Codeine Nausea And Vomiting     Past Medical History:   Diagnosis Date    Addiction to drug     Alcohol abuse     Alcohol abuse, in remission 6/15/2015    14.5 weeks ago; AA weekly    Anemia     Anxiety 6/15/2015    Behavioral problem     Bipolar disorder     Bipolar disorder in remission 6/15/2015    Cirrhosis, Laennec's 6/15/2015    Depression     Encounter for blood transfusion     Epistaxis 6/15/2015    Fatigue     Glaucoma     Hematuria     Hepatic encephalopathy 6/15/2015    Hepatic enlargement     History of psychiatric care     History of psychiatric hospitalization     History of seizure 6/15/2015    1    hx of intentional Tylenol overdose 6/15/2015    2005- situational and hx of bipolar    Hx of psychiatric care     Jeana     Osteoarthritis     Other ascites 6/15/2015    Psychiatric exam requested by authority     Psychiatric problem     Psychosis 9/26/2019    Renal disorder     Seizures     Self-harming behavior     Suicide attempt     Therapy     Thrombocytopenia 6/15/2015     Past Surgical History:   Procedure Laterality Date    COSMETIC SURGERY      ESOPHAGOGASTRODUODENOSCOPY       Family History   Problem Relation Age of Onset    Alcohol abuse Father      Suicide Father     Bipolar disorder Father     Alcohol abuse Sister     Hypertension Mother     Alcohol abuse Paternal Grandfather     Drug abuse Neg Hx     Dementia Neg Hx      Social History     Tobacco Use    Smoking status: Never    Smokeless tobacco: Never   Substance Use Topics    Alcohol use: Not Currently     Comment: hx of ETOH abuse with cirrhosis of liver    Drug use: No     Review of Systems   Unable to perform ROS: Mental status change     Physical Exam     Initial Vitals [01/25/23 1049]   BP Pulse Resp Temp SpO2   103/60 (!) 58 16 98.7 °F (37.1 °C) 97 %      MAP       --         Physical Exam    Nursing note and vitals reviewed.  Constitutional: She appears well-developed and well-nourished.   HENT:   Head: Normocephalic and atraumatic.   Dry MM   Eyes: Conjunctivae and EOM are normal. Pupils are equal, round, and reactive to light.   Neck: Neck supple. No tracheal deviation present.   Normal range of motion.  Cardiovascular:  Normal rate and intact distal pulses.           Pulmonary/Chest: No respiratory distress.   Abdominal: Abdomen is soft. She exhibits no distension.   Musculoskeletal:         General: No tenderness or edema. Normal range of motion.      Cervical back: Normal range of motion and neck supple.     Neurological: She is alert. No cranial nerve deficit. GCS eye subscore is 4. GCS verbal subscore is 4. GCS motor subscore is 5.   Oriented to person   Skin: Skin is warm and dry.       ED Course   Critical Care    Date/Time: 1/25/2023 12:59 PM  Performed by: Juan Devine MD  Authorized by: Juan Devine MD   Direct patient critical care time: 15 minutes  Additional history critical care time: 15 minutes  Ordering / reviewing critical care time: 15 minutes  Documentation critical care time: 15 minutes  Consulting other physicians critical care time: 15 minutes  Consult with family critical care time: 10 minutes  Total critical care time (exclusive of procedural time) : 85  minutes  Critical care time was exclusive of separately billable procedures and treating other patients.  Critical care was necessary to treat or prevent imminent or life-threatening deterioration of the following conditions: CNS failure or compromise and hepatic failure.  Critical care was time spent personally by me on the following activities: development of treatment plan with patient or surrogate, interpretation of cardiac output measurements, evaluation of patient's response to treatment, examination of patient, obtaining history from patient or surrogate, ordering and performing treatments and interventions, ordering and review of radiographic studies, pulse oximetry and ordering and review of laboratory studies.      Labs Reviewed   CBC W/ AUTO DIFFERENTIAL - Abnormal; Notable for the following components:       Result Value    WBC 2.40 (*)     RBC 3.70 (*)     Hematocrit 36.4 (*)     MCH 33.0 (*)     RDW 15.6 (*)     Platelets 79 (*)     Gran # (ANC) 1.3 (*)     Lymph # 0.8 (*)     Mono # 0.2 (*)     All other components within normal limits   COMPREHENSIVE METABOLIC PANEL - Abnormal; Notable for the following components:    Chloride 113 (*)     CO2 22 (*)     Albumin 3.1 (*)     Total Bilirubin 2.4 (*)     Anion Gap 3 (*)     All other components within normal limits   PROTIME-INR - Abnormal; Notable for the following components:    Prothrombin Time 15.1 (*)     INR 1.5 (*)     All other components within normal limits   APTT - Abnormal; Notable for the following components:    aPTT 32.3 (*)     All other components within normal limits   LIPASE - Abnormal; Notable for the following components:    Lipase 187 (*)     All other components within normal limits   AMMONIA - Abnormal; Notable for the following components:    Ammonia 139 (*)     All other components within normal limits    Narrative:     sample looks normal    ALCOHOL,MEDICAL (ETHANOL)   TSH   TROPONIN I   MAGNESIUM   LITHIUM LEVEL   URINALYSIS    DRUG SCREEN PANEL, URINE EMERGENCY   LEVETIRACETAM  (KEPPRA) LEVEL   POCT INFLUENZA A/B MOLECULAR   POCT GLUCOSE   POCT GLUCOSE MONITORING CONTINUOUS              X-Rays:   Independently Interpreted Readings:   Other Readings:  Chest x-ray interpreted independently by me pending radiology review:  No acute infiltrate, no pneumothorax, no effusion    CT head interpreted independently by me pending radiology review:  No acute intracranial findings  Imaging Results              CT Head Without Contrast (Final result)  Result time 01/25/23 12:53:51      Final result by Ayush Umana MD (01/25/23 12:53:51)                   Impression:      No evidence of acute hemorrhage or major vascular distribution infarct.      Electronically signed by: Ayush Umana MD  Date:    01/25/2023  Time:    12:53               Narrative:    EXAMINATION:  CT HEAD WITHOUT CONTRAST    CLINICAL HISTORY:  Mental status change, unknown cause;    TECHNIQUE:  Low dose axial CT images obtained throughout the head without the use of intravenous contrast.  Axial, sagittal and coronal reconstructions were performed.    COMPARISON:  11/2/21.    FINDINGS:  Intracranial compartment:    Ventricles and sulci are normal in size for age without evidence of hydrocephalus.    The brain parenchyma appears stable.  No new parenchymal  hemorrhage, edema, mass effect or major vascular distribution infarct.    No extra-axial blood or fluid collections.    Skull/extracranial contents (limited evaluation):    No displaced calvarial fracture.    The mastoid air cells and visualized paranasal sinuses are essentially clear.    Post-surgical change in the pre-maxillary soft tissues.  Bilateral pseudophakia.                                       X-Ray Chest AP Portable (Final result)  Result time 01/25/23 12:05:27      Final result by Jamar Funes MD (01/25/23 12:05:27)                   Impression:      No detrimental change or radiographic acute intrathoracic  process seen.      Electronically signed by: Jamar Funes MD  Date:    01/25/2023  Time:    12:05               Narrative:    EXAMINATION:  XR CHEST AP PORTABLE    CLINICAL HISTORY:  Altered mental status;    TECHNIQUE:  Single frontal view of the chest was performed.    COMPARISON:  Chest radiograph 11/02/2021    FINDINGS:  Monitoring leads overlie the chest.    No detrimental change.  Unchanged slight elevation of the right hemidiaphragm.  Cardiomediastinal silhouette is midline and stable without evidence of failure.  Pulmonary vasculature and hilar contours are within normal limits.  Grossly similar scattered linear opacities throughout the lungs consistent with platelike scarring versus atelectasis.  The lungs are otherwise well expanded without large consolidation, pleural effusion or pneumothorax.  No acute osseous process seen.  PA and lateral views can be obtained.                                      Medications   lactulose 20 gram/30 mL solution Soln 30 g (has no administration in time range)   lactated ringers bolus 1,000 mL (has no administration in time range)   sodium chloride 0.9% bolus 500 mL 500 mL (500 mLs Intravenous New Bag 1/25/23 1143)     Medical Decision Making:   History:   I obtained history from: someone other than patient and EMS provider.       <> Summary of History: EMS confirmed triage note and vital signs EN route; also spoke with the patient's mother who reports that she is had similar presentations in the past, that for the last 4 days she has been eating or drinking as much prompting the mother to call 911 this morning because the patient was no longer contributing to her ADLs    Mother's (Anastasia) phone number is [phone]    Sister's (Zaria) phone number is [phone]  Old Medical Records: I decided to obtain old medical records.  Old Records Summarized: records from previous admission(s).       <> Summary of Records: Reviewed most recent discharge summary from an [facility]  facility in November of 2021, with a apparently similar seeming presentation, was admitted for encephalopathy and urinary tract infection, also with noted medication noncompliance  Initial Assessment:   57-year-old female brought to the emergency department for evaluation of altered mental status  Differential Diagnosis:   Differential:  Infection, UTI, pneumonia, encephalopathy, dehydration, electrolyte dyscrasias, AYESHA, hyperammonemia, renal failure, noncompliance, toxidrome, overdose, withdrawal, several of these diagnoses may require admission which is being considered in this patient     Initial MDM: Plan to start with IV fluid, a relatively broad altered mental status workup including head CT, chest x-ray, labs, urine sample  Independently Interpreted Test(s):   I have ordered and independently interpreted X-rays - see prior notes.  I have ordered and independently interpreted EKG Reading(s) - see prior notes  Clinical Tests:   Lab Tests: Reviewed       <> Summary of Lab: CBC with borderline low white blood cell count similar to baseline, somewhat improved H&H compared to baseline, borderline low platelet count similar to baseline; CMP consistent with mild dehydration, normal kidney function, mildly elevated lipase, significantly elevated ammonia level; troponin and TSH both within normal limits  ED Management:  I have ordered 2 L of IV fluid and a dose of lactulose.  Patient is pending urinalysis and urine drug screen results.  She will require admission for hepatic encephalopathy.  I have discussed her case with [facility] who will see and admit the patient.                         Clinical Impression:   Final diagnoses:  [R41.82] Altered mental status  [K76.82] Hepatic encephalopathy (Primary)        ED Disposition Condition    Admit Stable                Juan Devine MD  01/25/23 4581

## 2023-01-25 NOTE — ASSESSMENT & PLAN NOTE
Unknown patient's last seizure  Could be seizure as patient was found by EMS urinary incontinence     Plan:  -Continue home Keppra 500 BID  -Follow up Keppra level

## 2023-01-26 LAB
ALBUMIN SERPL BCP-MCNC: 3 G/DL (ref 3.5–5.2)
ALP SERPL-CCNC: 82 U/L (ref 55–135)
ALT SERPL W/O P-5'-P-CCNC: 15 U/L (ref 10–44)
ANION GAP SERPL CALC-SCNC: 7 MMOL/L (ref 8–16)
AST SERPL-CCNC: 36 U/L (ref 10–40)
BASOPHILS # BLD AUTO: 0.02 K/UL (ref 0–0.2)
BASOPHILS NFR BLD: 0.9 % (ref 0–1.9)
BILIRUB SERPL-MCNC: 3 MG/DL (ref 0.1–1)
BUN SERPL-MCNC: 10 MG/DL (ref 6–20)
CALCIUM SERPL-MCNC: 9.7 MG/DL (ref 8.7–10.5)
CHLORIDE SERPL-SCNC: 120 MMOL/L (ref 95–110)
CO2 SERPL-SCNC: 14 MMOL/L (ref 23–29)
CREAT SERPL-MCNC: 0.8 MG/DL (ref 0.5–1.4)
DIFFERENTIAL METHOD: ABNORMAL
EOSINOPHIL # BLD AUTO: 0.1 K/UL (ref 0–0.5)
EOSINOPHIL NFR BLD: 3.9 % (ref 0–8)
ERYTHROCYTE [DISTWIDTH] IN BLOOD BY AUTOMATED COUNT: 15.7 % (ref 11.5–14.5)
EST. GFR  (NO RACE VARIABLE): >60 ML/MIN/1.73 M^2
GLUCOSE SERPL-MCNC: 124 MG/DL (ref 70–110)
HCT VFR BLD AUTO: 36.8 % (ref 37–48.5)
HGB BLD-MCNC: 12.2 G/DL (ref 12–16)
IMM GRANULOCYTES # BLD AUTO: 0.01 K/UL (ref 0–0.04)
IMM GRANULOCYTES NFR BLD AUTO: 0.4 % (ref 0–0.5)
INR PPP: 1.5 (ref 0.8–1.2)
LYMPHOCYTES # BLD AUTO: 0.9 K/UL (ref 1–4.8)
LYMPHOCYTES NFR BLD: 36.5 % (ref 18–48)
MAGNESIUM SERPL-MCNC: 2 MG/DL (ref 1.6–2.6)
MCH RBC QN AUTO: 32.6 PG (ref 27–31)
MCHC RBC AUTO-ENTMCNC: 33.2 G/DL (ref 32–36)
MCV RBC AUTO: 98 FL (ref 82–98)
MONOCYTES # BLD AUTO: 0.2 K/UL (ref 0.3–1)
MONOCYTES NFR BLD: 10.3 % (ref 4–15)
NEUTROPHILS # BLD AUTO: 1.1 K/UL (ref 1.8–7.7)
NEUTROPHILS NFR BLD: 48 % (ref 38–73)
NRBC BLD-RTO: 0 /100 WBC
PHOSPHATE SERPL-MCNC: 2.8 MG/DL (ref 2.7–4.5)
PLATELET # BLD AUTO: 74 K/UL (ref 150–450)
PMV BLD AUTO: 10 FL (ref 9.2–12.9)
POTASSIUM SERPL-SCNC: 3.2 MMOL/L (ref 3.5–5.1)
PROT SERPL-MCNC: 6 G/DL (ref 6–8.4)
PROTHROMBIN TIME: 15.4 SEC (ref 9–12.5)
RBC # BLD AUTO: 3.74 M/UL (ref 4–5.4)
SODIUM SERPL-SCNC: 141 MMOL/L (ref 136–145)
WBC # BLD AUTO: 2.33 K/UL (ref 3.9–12.7)

## 2023-01-26 PROCEDURE — 25000003 PHARM REV CODE 250: Performed by: STUDENT IN AN ORGANIZED HEALTH CARE EDUCATION/TRAINING PROGRAM

## 2023-01-26 PROCEDURE — 25000003 PHARM REV CODE 250

## 2023-01-26 PROCEDURE — 25000003 PHARM REV CODE 250: Performed by: PSYCHIATRY & NEUROLOGY

## 2023-01-26 PROCEDURE — 99223 PR INITIAL HOSPITAL CARE,LEVL III: ICD-10-PCS | Mod: ,,, | Performed by: PSYCHIATRY & NEUROLOGY

## 2023-01-26 PROCEDURE — 97530 THERAPEUTIC ACTIVITIES: CPT

## 2023-01-26 PROCEDURE — 97165 OT EVAL LOW COMPLEX 30 MIN: CPT

## 2023-01-26 PROCEDURE — 85025 COMPLETE CBC W/AUTO DIFF WBC: CPT

## 2023-01-26 PROCEDURE — 99223 1ST HOSP IP/OBS HIGH 75: CPT | Mod: ,,, | Performed by: PSYCHIATRY & NEUROLOGY

## 2023-01-26 PROCEDURE — 80053 COMPREHEN METABOLIC PANEL: CPT

## 2023-01-26 PROCEDURE — 84100 ASSAY OF PHOSPHORUS: CPT

## 2023-01-26 PROCEDURE — 11000001 HC ACUTE MED/SURG PRIVATE ROOM

## 2023-01-26 PROCEDURE — 97535 SELF CARE MNGMENT TRAINING: CPT

## 2023-01-26 PROCEDURE — 85610 PROTHROMBIN TIME: CPT

## 2023-01-26 PROCEDURE — 63600175 PHARM REV CODE 636 W HCPCS

## 2023-01-26 PROCEDURE — 83735 ASSAY OF MAGNESIUM: CPT

## 2023-01-26 RX ORDER — ARIPIPRAZOLE 5 MG/1
5 TABLET ORAL DAILY
Status: DISCONTINUED | OUTPATIENT
Start: 2023-01-27 | End: 2023-01-28 | Stop reason: HOSPADM

## 2023-01-26 RX ORDER — QUETIAPINE FUMARATE 200 MG/1
1 TABLET, FILM COATED ORAL NIGHTLY
COMMUNITY
Start: 2022-08-29 | End: 2023-01-26 | Stop reason: DRUGHIGH

## 2023-01-26 RX ORDER — ARIPIPRAZOLE 10 MG/1
10 TABLET ORAL NIGHTLY
COMMUNITY
Start: 2022-11-25 | End: 2023-05-05 | Stop reason: CLARIF

## 2023-01-26 RX ORDER — LITHIUM CARBONATE 150 MG/1
150 CAPSULE ORAL NIGHTLY
Status: DISCONTINUED | OUTPATIENT
Start: 2023-01-26 | End: 2023-01-28 | Stop reason: HOSPADM

## 2023-01-26 RX ORDER — HYDROXYZINE HYDROCHLORIDE 50 MG/1
50 TABLET, FILM COATED ORAL NIGHTLY
Status: ON HOLD | COMMUNITY
Start: 2022-12-03 | End: 2023-03-13 | Stop reason: HOSPADM

## 2023-01-26 RX ORDER — ENOXAPARIN SODIUM 100 MG/ML
40 INJECTION SUBCUTANEOUS EVERY 24 HOURS
Status: DISCONTINUED | OUTPATIENT
Start: 2023-01-26 | End: 2023-01-28 | Stop reason: HOSPADM

## 2023-01-26 RX ORDER — QUETIAPINE FUMARATE 25 MG/1
50 TABLET, FILM COATED ORAL NIGHTLY
Status: DISCONTINUED | OUTPATIENT
Start: 2023-01-26 | End: 2023-01-28 | Stop reason: HOSPADM

## 2023-01-26 RX ORDER — ZONISAMIDE 100 MG/1
100 CAPSULE ORAL NIGHTLY
Status: ON HOLD | COMMUNITY
End: 2023-01-27 | Stop reason: HOSPADM

## 2023-01-26 RX ORDER — PANTOPRAZOLE SODIUM 40 MG/1
40 TABLET, DELAYED RELEASE ORAL DAILY
COMMUNITY
Start: 2022-12-03 | End: 2023-05-05

## 2023-01-26 RX ORDER — LITHIUM CARBONATE 300 MG
300 TABLET ORAL NIGHTLY
Status: ON HOLD | COMMUNITY
Start: 2022-12-20 | End: 2023-03-13 | Stop reason: HOSPADM

## 2023-01-26 RX ORDER — PROPRANOLOL HYDROCHLORIDE 40 MG/1
40 TABLET ORAL DAILY
Status: ON HOLD | COMMUNITY
Start: 2022-12-03 | End: 2023-08-01

## 2023-01-26 RX ORDER — MOMETASONE FUROATE 1 MG/G
OINTMENT TOPICAL
Status: ON HOLD | COMMUNITY
Start: 2023-01-05 | End: 2023-02-12 | Stop reason: HOSPADM

## 2023-01-26 RX ORDER — LEVETIRACETAM 500 MG/1
1000 TABLET ORAL 2 TIMES DAILY
COMMUNITY
Start: 2023-01-04 | End: 2023-03-03 | Stop reason: SDUPTHER

## 2023-01-26 RX ORDER — HYDROXYZINE HYDROCHLORIDE 25 MG/1
50 TABLET, FILM COATED ORAL 3 TIMES DAILY PRN
Status: DISCONTINUED | OUTPATIENT
Start: 2023-01-26 | End: 2023-01-26

## 2023-01-26 RX ORDER — QUETIAPINE FUMARATE 100 MG/1
100 TABLET, FILM COATED ORAL NIGHTLY
Status: ON HOLD | COMMUNITY
Start: 2022-12-26 | End: 2023-03-13 | Stop reason: SDUPTHER

## 2023-01-26 RX ORDER — LITHIUM CARBONATE 150 MG/1
300 CAPSULE ORAL NIGHTLY
Status: DISCONTINUED | OUTPATIENT
Start: 2023-01-26 | End: 2023-01-26

## 2023-01-26 RX ORDER — SPIRONOLACTONE 25 MG/1
25 TABLET ORAL DAILY
Status: ON HOLD | COMMUNITY
Start: 2022-12-03 | End: 2023-05-28 | Stop reason: HOSPADM

## 2023-01-26 RX ORDER — HYDROXYZINE HYDROCHLORIDE 10 MG/1
TABLET, FILM COATED ORAL
COMMUNITY
Start: 2022-08-12 | End: 2023-01-26 | Stop reason: DRUGHIGH

## 2023-01-26 RX ORDER — ZOLPIDEM TARTRATE 12.5 MG/1
10 TABLET, FILM COATED, EXTENDED RELEASE ORAL
COMMUNITY
End: 2023-01-26

## 2023-01-26 RX ORDER — QUETIAPINE FUMARATE 25 MG/1
25 TABLET, FILM COATED ORAL NIGHTLY
COMMUNITY
End: 2023-01-26 | Stop reason: DRUGHIGH

## 2023-01-26 RX ORDER — METRONIDAZOLE 250 MG/1
250 TABLET ORAL 2 TIMES DAILY
Status: ON HOLD | COMMUNITY
Start: 2023-01-19 | End: 2023-01-27 | Stop reason: HOSPADM

## 2023-01-26 RX ORDER — HYDROXYZINE HYDROCHLORIDE 25 MG/1
50 TABLET, FILM COATED ORAL DAILY PRN
Status: DISCONTINUED | OUTPATIENT
Start: 2023-01-26 | End: 2023-01-28 | Stop reason: HOSPADM

## 2023-01-26 RX ORDER — PROPRANOLOL HYDROCHLORIDE 10 MG/1
20 TABLET ORAL DAILY
Status: DISCONTINUED | OUTPATIENT
Start: 2023-01-26 | End: 2023-01-28 | Stop reason: HOSPADM

## 2023-01-26 RX ORDER — QUETIAPINE FUMARATE 200 MG/1
200 TABLET, FILM COATED ORAL NIGHTLY
COMMUNITY
Start: 2022-09-09 | End: 2023-01-26 | Stop reason: DRUGHIGH

## 2023-01-26 RX ADMIN — SPIRONOLACTONE 25 MG: 25 TABLET ORAL at 10:01

## 2023-01-26 RX ADMIN — LACTULOSE 30 G: 10 SOLUTION ORAL at 04:01

## 2023-01-26 RX ADMIN — LACTULOSE 30 G: 10 SOLUTION ORAL at 09:01

## 2023-01-26 RX ADMIN — QUETIAPINE FUMARATE 50 MG: 25 TABLET ORAL at 08:01

## 2023-01-26 RX ADMIN — LITHIUM CARBONATE 150 MG: 150 CAPSULE, GELATIN COATED ORAL at 08:01

## 2023-01-26 RX ADMIN — POTASSIUM BICARBONATE 25 MEQ: 977.5 TABLET, EFFERVESCENT ORAL at 07:01

## 2023-01-26 RX ADMIN — ENOXAPARIN SODIUM 40 MG: 40 INJECTION SUBCUTANEOUS at 04:01

## 2023-01-26 RX ADMIN — LEVETIRACETAM 500 MG: 500 TABLET, FILM COATED ORAL at 10:01

## 2023-01-26 RX ADMIN — POTASSIUM BICARBONATE 25 MEQ: 977.5 TABLET, EFFERVESCENT ORAL at 10:01

## 2023-01-26 RX ADMIN — LEVETIRACETAM 500 MG: 500 TABLET, FILM COATED ORAL at 08:01

## 2023-01-26 RX ADMIN — ARIPIPRAZOLE 10 MG: 5 TABLET ORAL at 10:01

## 2023-01-26 RX ADMIN — Medication 6 MG: at 08:01

## 2023-01-26 RX ADMIN — LACTULOSE 30 G: 10 SOLUTION ORAL at 10:01

## 2023-01-26 NOTE — CONSULTS
PSYCHIATRY INPATIENT CONSULT NOTE      1/26/2023 11:31 AM   Marely Hamilton   1965   [id number]           DATE OF ADMISSION: 1/25/2023 10:45 AM    SITE: [facility]    CURRENT LEGAL STATUS: Patient does not currently meet PEC criteria due to not currently being an imminent threat to self/others and not being gravely disabled 2/2 mental illness at this time.     HISTORY    Per Initial History from Primary Team:   58 yo F with PMH alcoholic cirrhosis, hepatic encephalopathy, seizure, bipolar disorder who presented to [facility] ED via EMS after call for AMS at patient's home. Per EMS report, patient was found with urinary incontinence and confused. Patient reports she has been feeling sick for a few days now. Denies any complaints. Unable to explain any symptoms or what medications she takes. She denies fever/chills, nausea or vomiting, abdominal pain. She denies any alcohol, drug or tobacco use. Due to patient unable to answer questions appropriately, called mother who patient lives with for collateral. She states patient is compliant with her medications and goes to  PCP. However, she states for the past few days she noticed she has been more sleepier than usual and could not communicate at her baseline. She also reports decreased appetite. She states she has been trying to keep patient out of the hospital but she often goes back in for a similar presentation. She states sister who lives in [place] was the one who called EMS for patient due to concerns.   In ED, HR 58, RR16, /60, stable on room air. Ammonia 139. Ethanol wnl. Tbili 2.4. WBC 2.40. PT/INR 15.1/1.5. CTH negative. CXR with no acute abnormality. UA and UDS in process. [facility] consulted for admission for hepatic encephalopathy vs seizure.   Interval History from Primary Team on 01/26/2023:  Overnight, had 2 BM. Mentation improved, now oriented to person and place but not to time. No other complaints other than on-going weakness which  is why she has been unable to get out of bed much. She also states some of her psych meds may be contributing factor as well. Dispo pending Psych recs and PT/OT recs.       Chief Complaint / Reason for Psychiatry Consult: Psychiatric Medication Management in Patient with History of Bipolar Disorder and Hepatic Encephalopathy       HPI:   Marely Hamilton is a 57 y.o. female with a past medical history of alcoholic cirrhosis, hepatic encephalopathy, and seizure, and a past psychiatric history of Bipolar I Disorder and Alcohol Use Disorder, currently being treated by her inpatient primary team for a principle problem of Hepatic Encephalopathy.  Psychiatry was originally consulted as noted above.  The patient was seen and examined.  The chart was reviewed.  On examination today, the patient was alert and oriented to person, type of place, state, and situation.  She was disoriented to name of place, city, month, and year.  She was CAM-ICU negative for delirium (initially got questions incorrect, then self-corrected ; patient with insight into confusion in HE).  She endorses that her mood has been stable on her most recent outpatient psychiatric medication regimen from her longstanding psychiatrist (Dr. Dewey Coates) of Lithium, Seroquel, and Abilify (patient endorses recent compliance but was unable to give specific dosages ; per discussion with Dr. Coates, her current regimen is Lithium 300 mg PO QHS, Seroquel 100 mg PO QHS, and Abilify 10 mg PO daily ; see collateral below).  Other than some chronic intermittent low mood when thinking about not being able to work anymore as a , she denies any current or recent s/s of depression.  She denies any current or recent s/s of anxiety / panic.  She denies any current or recent s/s consistent with psychosis other than some vague recent nighttimes hallucinations that appear to be more in the context of hepatic encephalopathy / delirium rather than a mood / psychosis  disorder.  She denies any current / recent issues with insomnia (unable to quantify number of hours of sleep per night).  She endorses a good appetite but does endorses some recent mild unintentional weight loss.  She denies any current or recent active or passive SI / HI.  She denies any current AH, VH, TH, delusions, paranoia, or DIGFAST (crystal) s/s.  She denies any adverse effects to her current medication regimen.  Regarding current medical/physical complaints, she endorses fatigue and confusion.  She denies any other medical complaints at this time.  NAD was observed during the examination.  See detailed psych ROS below.  See A/P below.      Collateral:    Per Primary Team's discussion with patient's sister:  Called patient's sister Zaria on file. She verified patient's name and . Discussed with her the plan is to repeat ammonia tomorrow and see if improves. Also to have PT/OT for evaluation for possible Home Health PT/OT due to on-going weakness. We also have Psych on board due to multiple medications possibly contributing to her lethargy. Patient and patient's mother feels like her psych meds may be too sedating. Lawtey has been held, but due to concerns by patient's sister, will re-start today to prevent manic episode relapse. Patient's sister reports patient has been seeing Dr. Coates for over a decade and to call [phone] if we have any questions or need to make any changes to her medications. Per dispense report, patient has not picked up Lactulose in a while. However, sister reports patient has been taking it but does miss some from time to time right before her hospitalization. She states usually it is due to a precipitating factor like a UTI. She also reports there is a helper that comes to the home 6 days a week. All questions were answered.     Per my discussion today with patient's longstanding outpatient psychiatrist (Dr. Dewey Coates):  Patient most recently has been stable on a regimen  of Lithium 300 mg PO QHS, Seroquel 100 mg PO QHS, and Abilify 10 mg PO daily.  I described her current presentation, and he stated that his sounds similar to prior admissions for hepatic encephalopathy / delirium as opposed to an acute decompensation of Bipolar Disorder.  We discussed that reducing her current regimen to Lithium 150 mg PO QHS, Seroquel 50 mg PO QHS, and Abilify 5 mg PO daily would be the most appropriate plan while her delirium / encephalopathy is improving.  His office can be contacted for outpatient psychiatric f/u at discharge from this hospitalization.       Psychiatric Review Of Systems - Currently, the patient is endorsing and/or denying the following:  (patient's endorsements are BOLDED below; if not BOLDED, then patient denied):    Endorses mild intermittent Symptoms of Depression (see HPI above): diminished mood, low motivation, loss of interest/anhedonia, irritability, diminished energy, change in sleep, change in appetite, diminished concentration or cognition or indecisiveness, PMA/R, excessive guilt or hopelessness or worthlessness, suicidal ideations    Denies issues with Sleep: initiation, maintenance, early morning awakening with inability to return to sleep    Denies Suicidal/Homicidal ideations: active/passive ideations, organized plans, future intentions    Denies Symptoms of psychosis: hallucinations, delusions, disorganized thinking, disorganized behavior or abnormal motor behavior, or negative symptoms (diminshed emotional expression, avolition, anhedonia, alogia, asociality     Denies Symptoms of crystal or hypomania: elevated, expansive, or irritable mood with increased energy or activity; with inflated self-esteem or grandiosity, decreased need for sleep, increased rate of speech, FOI or racing thoughts, distractibility, increased goal directed activity or PMA, risky/disinhibited behavior    Denies Symptoms of Anxiety: excessive anxiety/worry/fear, more days than not, about  numerous issues, difficult to control, with restlessness, fatigue, poor concentration, irritability, muscle tension, sleep disturbance; causes functionally impairing distress     Denies Symptoms of Panic Disorder: recurrent panic attacks, precipitated or un-precipitated, source of worry and/or behavioral changes secondary; with or without agoraphobia    Denies Symptoms of PTSD: h/o trauma; re-experiencing/intrusive symptoms, avoidant behavior, negative alterations in cognition or mood, or hyperarousal symptoms; with or without dissociative symptoms     Denies Symptoms of OCD: obsessions or compulsions     Denies Symptoms of Eating Disorders: anorexia, bulimia or binging    Denies Substance Use: intoxication, withdrawal, tolerance, used in larger amounts or duration than intended, unsuccessful attempts to limit or quit, increased time engaging in or seeking out, cravings or strong desire to use, failure to fulfill obligations, negative consequences in social/interpersonal/occupational,/recreational areas, use in dangerous situations, medical or psychological consequences       ROS:  General ROS: negative for - chills, fever or night sweats; positive for fatigue  Ophthalmic ROS: negative for - blurry vision, double vision or eye pain  ENT ROS: negative for - sinus pain, headaches, sore throat or visual changes  Allergy and Immunology ROS: negative for - hives, itchy/watery eyes or nasal congestion  Hematological and Lymphatic ROS: negative for - bleeding problems, bruising, jaundice or pallor  Endocrine ROS: negative for - galactorrhea, hot flashes, mood swings, palpitations or temperature intolerance  Respiratory ROS: negative for - cough, hemoptysis, shortness of breath, tachypnea or wheezing  Cardiovascular ROS: negative for - chest pain, dyspnea on exertion, loss of consciousness, palpitations, rapid heart rate or shortness of breath  Gastrointestinal ROS: negative for - appetite loss, nausea, abdominal pain,  blood in stools, change in bowel habits, constipation or diarrhea  Genito-Urinary ROS: negative for - incontinence, nocturia or pelvic pain  Musculoskeletal ROS: negative for - joint stiffness, joint swelling, joint pain or muscle pain   Neurological ROS: negative for - behavioral changes, dizziness, memory loss, numbness/tingling or seizures; positive for confusion  Dermatological ROS: negative for dry skin, hair changes, pruritus or rash  Psychiatric ROS: see detailed psychiatric ROS above in history section       Past Psychiatric History:  Previous Medication Trials: yes, multiple  Previous Psychiatric Hospitalizations: yes, multiple (most recently in 02/2021 at [facility])  Previous Suicide Attempts: 3 previous SA via OD  History of Violence: denies   Outpatient Psychiatrist: Dr. Dewey Coates with [facility]   Hx of Depression: yes  Hx of Anxiety: yes  Hx of Jeana: yes  Hx of Psychosis: denies   Hx of PTSD: denies     Social History:  Marital Status: single  Children: 0 (has two sisters)   Employment Status/Info: on disability  Education: post college graduate work or degree (EDIN from [employer] [place] [employer])  Special Ed: denies  : denies  Housing Status: lives with family in [place], [place]    HobJayCut/Leisure time: yes   History of phys/sexual abuse: denies  Access to gun: denies    Family Psychiatric History: father with bipolar disorder ; father and sister with alcoholism     Substance Abuse History:  Recreational Drugs: denies  Use of Alcohol: hx of heavy alcohol abuse / dependence with resultant cirrhosis ; endorses total sobriety for multiple years (unable to give specific #) ; endorses hx of complicated withdrawal   Rehab History: yes  Tobacco Use: denies     Legal History:  Past Charges/Incarcerations: prior DUI   Pending charges: denies      Psychosocial Stressors: financial, health, and occupational   Functioning Relationships: good support system in family  Strengths AND Liabilities: Strength: Patient  accepts guidance/feedback, Strength: Patient is expressive/articulate., Strength: Patient is motivated for change., Strength: Patient has positive support network., Liability: Patient has poor health., Liability: Patient has possible cognitive impairment.      PAST MEDICAL & SURGICAL HISTORY   Past Medical History:   Diagnosis Date    Addiction to drug     Alcohol abuse     Alcohol abuse, in remission 6/15/2015    14.5 weeks ago; AA weekly    Anemia     Anxiety 6/15/2015    Behavioral problem     Bipolar disorder     Bipolar disorder in remission 6/15/2015    Cirrhosis, Laennec's 6/15/2015    Depression     Encounter for blood transfusion     Epistaxis 6/15/2015    Fatigue     Glaucoma     Hematuria     Hepatic encephalopathy 6/15/2015    Hepatic enlargement     History of psychiatric care     History of psychiatric hospitalization     History of seizure 6/15/2015    1    hx of intentional Tylenol overdose 6/15/2015    2005- situational and hx of bipolar    Hx of psychiatric care     Jeana     Osteoarthritis     Other ascites 6/15/2015    Psychiatric exam requested by authority     Psychiatric problem     Psychosis 9/26/2019    Renal disorder     Seizures     Self-harming behavior     Suicide attempt     Therapy     Thrombocytopenia 6/15/2015     Past Surgical History:   Procedure Laterality Date    COSMETIC SURGERY      ESOPHAGOGASTRODUODENOSCOPY         NEUROLOGIC HISTORY  Seizures: yes   Head trauma with LOC: denies   CVA: denies     FAMILY HISTORY   Family History   Problem Relation Age of Onset    Alcohol abuse Father     Suicide Father     Bipolar disorder Father     Alcohol abuse Sister     Hypertension Mother     Alcohol abuse Paternal Grandfather     Drug abuse Neg Hx     Dementia Neg Hx        ALLERGIES   Review of patient's allergies indicates:   Allergen Reactions    Sulfa (sulfonamide antibiotics) Rash    Codeine Nausea And Vomiting       CURRENT MEDICATION REGIMEN   Home Meds:   Prior to Admission  medications    Medication Sig Start Date End Date Taking? Authorizing Provider   ARIPiprazole (ABILIFY) 10 MG Tab Take 10 mg by mouth nightly. 11/25/22  Yes Historical Provider   hydrOXYzine (ATARAX) 50 MG tablet Take 50 mg by mouth every evening. 12/3/22  Yes Historical Provider   levETIRAcetam (KEPPRA) 500 MG Tab Take 1,000 mg by mouth 2 (two) times daily. 1/4/23  Yes Historical Provider   lithium (LITHOTAB) 300 mg tablet Take 300 mg by mouth nightly. 12/20/22  Yes Historical Provider   metroNIDAZOLE (FLAGYL) 250 MG tablet Take 250 mg by mouth 2 (two) times a day. 1/19/23  Yes Historical Provider   mometasone (ELOCON) 0.1 % ointment Apply topically. 1/5/23  Yes Historical Provider   pantoprazole (PROTONIX) 40 MG tablet Take 40 mg by mouth once daily. 12/3/22  Yes Historical Provider   propranoloL (INDERAL) 20 MG tablet Take 20 mg by mouth once daily. 12/3/22  Yes Historical Provider   QUEtiapine (SEROQUEL) 100 MG Tab Take 100 mg by mouth every evening. 12/26/22  Yes Historical Provider   spironolactone (ALDACTONE) 25 MG tablet Take 25 mg by mouth once daily. 12/3/22  Yes Historical Provider   zonisamide (ZONEGRAN) 100 MG Cap Take 100 mg by mouth every evening.   Yes Historical Provider   multivitamin Tab Take 1 tablet by mouth once daily. 2/2/21   Lucero Castillo MD   ARIPiprazole (ABILIFY) 20 MG Tab Take 1 tablet (20 mg total) by mouth once daily.  Patient taking differently: Take 10 mg by mouth once daily. 2/3/21 1/26/23  Lucero Castillo MD   camphor-menthol 0.5-0.5% (SARNA) lotion Apply topically as needed (muscle pain). 2/2/21 1/26/23  Lucero Castillo MD   folic acid (FOLVITE) 1 MG tablet Take 1 tablet (1 mg total) by mouth once daily. 2/2/21 1/26/23  Lucero Castillo MD   furosemide (LASIX) 20 MG tablet Take 1 tablet (20 mg total) by mouth once daily. 2/2/21 1/26/23  Lucero Castillo MD   hydrOXYzine HCL (ATARAX) 10 MG Tab Take by mouth. 8/12/22 1/26/23  Historical Provider   lactulose (CHRONULAC) 10 gram/15 mL  solution Take 45 mLs (30 g total) by mouth 2 (two) times daily. 11/4/21 1/26/23  Pallavi Sunkara, MD   lithium (ESKALITH) 300 MG capsule Take 1 capsule (300 mg total) by mouth 2 (two) times daily.  Patient taking differently: Take 300 mg by mouth every evening. 2/2/21 1/26/23  Lucero Castillo MD   magnesium oxide (MAG-OX) 400 mg (241.3 mg magnesium) tablet Take 400 mg by mouth once daily. 250mg daily  1/26/23  Historical Provider   propranoloL (INDERAL) 40 MG tablet Take 40 mg by mouth once daily.  1/26/23  Historical Provider   QUEtiapine (SEROQUEL) 200 MG Tab Take 200 mg by mouth every evening. 9/9/22 1/26/23  Historical Provider   QUEtiapine (SEROQUEL) 200 MG Tab Take 1 tablet by mouth every evening. 8/29/22 1/26/23  Historical Provider   QUEtiapine (SEROQUEL) 25 MG Tab Take 25 mg by mouth every evening.  1/26/23  Historical Provider   QUEtiapine (SEROQUEL) 300 MG Tab Take 1 tablet (300 mg total) by mouth nightly. 2/2/21 1/26/23  Lucero Castillo MD   sodium bicarbonate 650 MG tablet Take 1 tablet (650 mg total) by mouth once daily. 2/2/21 1/26/23  Lucero Castillo MD   spironolactone (ALDACTONE) 50 MG tablet Take 1 tablet (50 mg total) by mouth once daily. 2/2/21 1/26/23  Lucero Castillo MD   zolpidem (AMBIEN CR) 12.5 MG CR tablet Take 10 mg by mouth.  1/26/23  Historical Provider   zonisamide (ZONEGRAN) 100 MG Cap Take 1 capsule (100 mg total) by mouth once daily. 2/2/21 1/26/23  Lucero Castillo MD       OTC Meds: denies     Scheduled Meds:    [START ON 1/27/2023] ARIPiprazole  5 mg Oral Daily    enoxaparin  40 mg Subcutaneous Daily    lactulose  30 g Oral TID    levETIRAcetam  500 mg Oral BID    lithium  150 mg Oral QHS    propranoloL  20 mg Oral Daily    QUEtiapine  50 mg Oral QHS    spironolactone  25 mg Oral Daily      PRN Meds: dextrose 10%, dextrose 10%, glucagon (human recombinant), glucose, glucose, hydrOXYzine HCL, melatonin, naloxone, ondansetron, senna-docusate 8.6-50 mg, sodium chloride 0.9%    Psychotherapeutics (From admission, onward)      Start     Stop Route Frequency Ordered    01/27/23 0900  ARIPiprazole tablet 5 mg         -- Oral Daily 01/26/23 1726 01/26/23 2100  lithium capsule         -- Oral Nightly 01/26/23 1726 01/26/23 2100  QUEtiapine tablet 50 mg         -- Oral Nightly 01/26/23 1726            LABORATORY DATA   Recent Results (from the past 72 hour(s))   CBC auto differential    Collection Time: 01/25/23 11:22 AM   Result Value Ref Range    WBC 2.40 (L) 3.90 - 12.70 K/uL    RBC 3.70 (L) 4.00 - 5.40 M/uL    Hemoglobin 12.2 12.0 - 16.0 g/dL    Hematocrit 36.4 (L) 37.0 - 48.5 %    MCV 98 82 - 98 fL    MCH 33.0 (H) 27.0 - 31.0 pg    MCHC 33.5 32.0 - 36.0 g/dL    RDW 15.6 (H) 11.5 - 14.5 %    Platelets 79 (L) 150 - 450 K/uL    MPV 10.0 9.2 - 12.9 fL    Immature Granulocytes 0.4 0.0 - 0.5 %    Gran # (ANC) 1.3 (L) 1.8 - 7.7 K/uL    Immature Grans (Abs) 0.01 0.00 - 0.04 K/uL    Lymph # 0.8 (L) 1.0 - 4.8 K/uL    Mono # 0.2 (L) 0.3 - 1.0 K/uL    Eos # 0.1 0.0 - 0.5 K/uL    Baso # 0.01 0.00 - 0.20 K/uL    nRBC 0 0 /100 WBC    Gran % 55.4 38.0 - 73.0 %    Lymph % 33.8 18.0 - 48.0 %    Mono % 6.7 4.0 - 15.0 %    Eosinophil % 3.3 0.0 - 8.0 %    Basophil % 0.4 0.0 - 1.9 %    Differential Method Automated    Comprehensive metabolic panel    Collection Time: 01/25/23 11:22 AM   Result Value Ref Range    Sodium 138 136 - 145 mmol/L    Potassium 3.9 3.5 - 5.1 mmol/L    Chloride 113 (H) 95 - 110 mmol/L    CO2 22 (L) 23 - 29 mmol/L    Glucose 103 70 - 110 mg/dL    BUN 15 6 - 20 mg/dL    Creatinine 0.7 0.5 - 1.4 mg/dL    Calcium 9.6 8.7 - 10.5 mg/dL    Total Protein 6.2 6.0 - 8.4 g/dL    Albumin 3.1 (L) 3.5 - 5.2 g/dL    Total Bilirubin 2.4 (H) 0.1 - 1.0 mg/dL    Alkaline Phosphatase 102 55 - 135 U/L    AST 35 10 - 40 U/L    ALT 14 10 - 44 U/L    Anion Gap 3 (L) 8 - 16 mmol/L    eGFR >60 >60 mL/min/1.73 m^2   Ethanol    Collection Time: 01/25/23 11:22 AM   Result Value Ref Range    Alcohol, Serum <10  <10 mg/dL   Protime-INR    Collection Time: 01/25/23 11:22 AM   Result Value Ref Range    Prothrombin Time 15.1 (H) 9.0 - 12.5 sec    INR 1.5 (H) 0.8 - 1.2   APTT    Collection Time: 01/25/23 11:22 AM   Result Value Ref Range    aPTT 32.3 (H) 21.0 - 32.0 sec   TSH    Collection Time: 01/25/23 11:22 AM   Result Value Ref Range    TSH 1.595 0.400 - 4.000 uIU/mL   Troponin I    Collection Time: 01/25/23 11:22 AM   Result Value Ref Range    Troponin I <0.006 0.000 - 0.026 ng/mL   Lipase    Collection Time: 01/25/23 11:22 AM   Result Value Ref Range    Lipase 187 (H) 4 - 60 U/L   Ammonia    Collection Time: 01/25/23 11:22 AM   Result Value Ref Range    Ammonia 139 (H) 10 - 50 umol/L   Magnesium    Collection Time: 01/25/23 11:22 AM   Result Value Ref Range    Magnesium 2.0 1.6 - 2.6 mg/dL   Lithium level    Collection Time: 01/25/23 11:22 AM   Result Value Ref Range    Lithium Level 0.9 0.6 - 1.2 mmol/L   POCT glucose    Collection Time: 01/25/23 11:34 AM   Result Value Ref Range    POCT Glucose 93 70 - 110 mg/dL   POCT Influenza A/B Molecular    Collection Time: 01/25/23 11:55 AM   Result Value Ref Range    POC Molecular Influenza A Ag Negative Negative, Not Reported    POC Molecular Influenza B Ag Negative Negative, Not Reported     Acceptable Yes    Drug screen panel, emergency    Collection Time: 01/25/23  1:56 PM   Result Value Ref Range    Benzodiazepines Negative Negative    Methadone metabolites Negative Negative    Cocaine (Metab.) Negative Negative    Opiate Scrn, Ur Negative Negative    Barbiturate Screen, Ur Negative Negative    Amphetamine Screen, Ur Negative Negative    THC Negative Negative    Phencyclidine Negative Negative    Creatinine, Urine 83.2 15.0 - 325.0 mg/dL    Toxicology Information SEE COMMENT    Urinalysis - Cath.    Collection Time: 01/25/23  1:57 PM   Result Value Ref Range    Specimen UA Urine, Catheterized     Color, UA Yellow Yellow, Straw, Sarah    Appearance, UA Clear  Clear    pH, UA 7.0 5.0 - 8.0    Specific Gravity, UA 1.015 1.005 - 1.030    Protein, UA Negative Negative    Glucose, UA Negative Negative    Ketones, UA Negative Negative    Bilirubin (UA) Negative Negative    Occult Blood UA Negative Negative    Nitrite, UA Negative Negative    Urobilinogen, UA 2.0-3.0 (A) <2.0 EU/dL    Leukocytes, UA Negative Negative   CK    Collection Time: 01/25/23  3:17 PM   Result Value Ref Range    CPK 27 20 - 180 U/L   Comprehensive Metabolic Panel (CMP)    Collection Time: 01/26/23  5:19 AM   Result Value Ref Range    Sodium 141 136 - 145 mmol/L    Potassium 3.2 (L) 3.5 - 5.1 mmol/L    Chloride 120 (H) 95 - 110 mmol/L    CO2 14 (L) 23 - 29 mmol/L    Glucose 124 (H) 70 - 110 mg/dL    BUN 10 6 - 20 mg/dL    Creatinine 0.8 0.5 - 1.4 mg/dL    Calcium 9.7 8.7 - 10.5 mg/dL    Total Protein 6.0 6.0 - 8.4 g/dL    Albumin 3.0 (L) 3.5 - 5.2 g/dL    Total Bilirubin 3.0 (H) 0.1 - 1.0 mg/dL    Alkaline Phosphatase 82 55 - 135 U/L    AST 36 10 - 40 U/L    ALT 15 10 - 44 U/L    Anion Gap 7 (L) 8 - 16 mmol/L    eGFR >60 >60 mL/min/1.73 m^2   Magnesium    Collection Time: 01/26/23  5:19 AM   Result Value Ref Range    Magnesium 2.0 1.6 - 2.6 mg/dL   Phosphorus    Collection Time: 01/26/23  5:19 AM   Result Value Ref Range    Phosphorus 2.8 2.7 - 4.5 mg/dL   CBC with Automated Differential    Collection Time: 01/26/23  5:19 AM   Result Value Ref Range    WBC 2.33 (L) 3.90 - 12.70 K/uL    RBC 3.74 (L) 4.00 - 5.40 M/uL    Hemoglobin 12.2 12.0 - 16.0 g/dL    Hematocrit 36.8 (L) 37.0 - 48.5 %    MCV 98 82 - 98 fL    MCH 32.6 (H) 27.0 - 31.0 pg    MCHC 33.2 32.0 - 36.0 g/dL    RDW 15.7 (H) 11.5 - 14.5 %    Platelets 74 (L) 150 - 450 K/uL    MPV 10.0 9.2 - 12.9 fL    Immature Granulocytes 0.4 0.0 - 0.5 %    Gran # (ANC) 1.1 (L) 1.8 - 7.7 K/uL    Immature Grans (Abs) 0.01 0.00 - 0.04 K/uL    Lymph # 0.9 (L) 1.0 - 4.8 K/uL    Mono # 0.2 (L) 0.3 - 1.0 K/uL    Eos # 0.1 0.0 - 0.5 K/uL    Baso # 0.02 0.00 - 0.20 K/uL  "   nRBC 0 0 /100 WBC    Gran % 48.0 38.0 - 73.0 %    Lymph % 36.5 18.0 - 48.0 %    Mono % 10.3 4.0 - 15.0 %    Eosinophil % 3.9 0.0 - 8.0 %    Basophil % 0.9 0.0 - 1.9 %    Differential Method Automated    PT/INR    Collection Time: 01/26/23  5:19 AM   Result Value Ref Range    Prothrombin Time 15.4 (H) 9.0 - 12.5 sec    INR 1.5 (H) 0.8 - 1.2      Lab Results   Component Value Date    PHENOBARB <2.0 (L) 01/15/2017         EXAMINATION    VITALS   Vitals:    01/26/23 1230 01/26/23 1258 01/26/23 1409 01/26/23 1512   BP: 137/71 (!) 145/67  134/63   Patient Position:  Lying  Lying   Pulse: 84 77  80   Resp: (!) 22 16  16   Temp:  98.2 °F (36.8 °C)  98.2 °F (36.8 °C)   TempSrc:  Oral  Oral   SpO2: 98% 95%  (!) 93%   Weight:       Height:   5' 3" (1.6 m)         CONSTITUTIONAL  General Appearance: NAD, unremarkable, age appropriate, normal weight, lying in bed, calm     MUSCULOSKELETAL  Muscle Strength and Tone: WNL (generalized weakness / fatigue noted)   Abnormal Involuntary Movements: none observed   Gait and Station: Attempted but unable to assess due to medical acuity     PSYCHIATRIC   Behavior/Cooperation:  friendly and cooperative, eye contact normal, calm  Speech:  normal tone, normal pitch, normal volume, slowed, increased latency of response  Language: grossly intact, able to name, able to repeat with spontaneous speech  Mood:  "pretty good"  Affect:  congruent with mood and full / reactive   Associations: intact; no TRISTAN  Thought Process: Linear with intermittent confusion   Thought Content: denies SI, HI, AH, VH, TH, delusions, or paranoia (no RIS observed)   Sensorium:  Awake/Delirium  Alert and Oriented: to person, type of place, state, and situation.  She was disoriented to name of place, city, month, and year.  Memory: 3/3 immediate, 1/3 at 5 minutes    Recent: Limited / Intact; able to report intermittent recent events   Remote: Limited / Intact; Named 3/4 past presidents   Attention/concentration: Fair / " Limited. Inappropriate for age/education. Able to spell w-o-r-l-d but NOT d-l-r-o-w.   Similarities: Intact (difference between apple and orange?)  Abstract reasoning: Limited  Fund of Knowledge: Named 3/4 past presidents   Insight: Limited / Intact  Judgment: Limited / Intact    CAM ICU Delirium Assessment - Patient was CAM-ICU negative for delirium (initially got questions incorrect, then self-corrected ; patient with insight into confusion in HE).    Is the patient aware of the biomedical complications associated with substance abuse and mental illness? yes        MEDICAL DECISION MAKING    ASSESSMENT        Bipolar I Disorder, In Full Remission   Delirium due to Medication Condition (Hepatic Encephalopathy) Without Behavioral Disturbances   Alcohol Use Disorder, Severe, Dependence, In Sustained Remission        RECOMMENDATIONS       - Patient does not currently meet PEC criteria due to not being an imminent threat to self/others and not being gravely disabled 2/2 mental illness at this time.       - After discussion with patient's longstanding outpatient psychiatrist (Dewey Coates MD), will continue a reduced regimen of Lithium 150 mg PO QHS for mood, Seroquel 50 mg PO QHS for mood, and Abilify 5 mg PO daily for mood while patient is recovering from hepatic encephalopathy / delirium (discussed risks/benefits/alt vs no treatment with patient).     Implement the below DELIRIUM BEHAVIOR MANAGEMENT:  - Minimize use of restraints; if physical restraints necessary, please utilize medical/chemical prns for agitation (can use Zyprexa 2.5 mg PO/IM q8 hours PRN).  - Keep shades open and room lit during day and room dim at night in order to promote healthy circadian rhythms.  - Encourage family at bedside.  - Keep whiteboard in patient's room current with the date and name of the members of patient's team for easy patient self re-orientation.  - Avoid benzodiazepines, antihistamines, anticholinergics, hypnotics, and  "minimize opiates while controlling for pain as these medications may exacerbate delirium.      - Patient's most recent resulted labs, imaging, and EKG were reviewed today ; Ammonia elevated at 139 (repeat pending) ; Lipase elevated at 187 ; Lithium level wnl at 0.9 ; UDS negative ; Ethanol < 10     - Please have Hospital CM/SW assist patient with asap outpatient mental health f/u for s/p discharge from this facility (with patient's longstanding psychiatrist, Dewey Coates MD).     - Patient was instructed to call 911 and/or [phone], and return to the nearest ED if she begins feeling suicidal, homicidal, or gravely disabled (for s/p this hospitalization).       - Thank you for this consult ; I will be on leave from 01/27/2023 through 01/30/2023 ; please utilize our [facility] service for any psychiatric consultation needs during this time ; the operating procedure for obtaining a telepsychiatry consultation is to place the "telemedicine-psych" order in [employer] and then call [phone] (option 3) to inform the team of the consult need (both the Epic order and the phone call are needed).           Total time spent with patient and/or managing/coordinating patient's care today: 78 minutes      More than 50% of the time was spent counseling/coordinating care.     Consulting clinician was informed of the encounter and consult note.       STAFF:  Alfredito Aguayo MD  [facility]   1/26/2023        "

## 2023-01-26 NOTE — CARE UPDATE
Called patient's sister Zaria on file. She verified patient's name and . Discussed with her the plan is to repeat ammonia tomorrow and see if improves. Also to have PT/OT for evaluation for possible Home Health PT/OT due to on-going weakness. We also have Psych on board due to multiple medications possibly contributing to her lethargy. Patient and patient's mother feels like her psych meds may be too sedating. [facility] has been held, but due to concerns by patient's sister, will re-start today to prevent manic episode relapse. Patient's sister reports patient has been seeing Dr. Coates for over a decade and to call [phone] if we have any questions or need to make any changes to her medications. Per dispense report, patient has not picked up Lactulose in a while. However, sister reports patient has been taking it but does miss some from time to time right before her hospitalization. She states usually it is due to a precipitating factor like a UTI. She also reports there is a helper that comes to the home 6 days a week. All questions were answered.     Radha aBng MD  [facility], PGY-1  2023

## 2023-01-26 NOTE — PLAN OF CARE
Occupational therapy evaluation completed in ED. Skilled acute OT services warranted to maximize occupational engagement and performance in daily and mobility routines. Patient oriented to self and with choices able to discern being in the hospital. Patient with tremulous, ataxic movements and significant lethargy on this date; tolerated transition to eob with moderate assist and requiring fluctuating minimal to moderate assist for sitting balance eob. Per conversation with patient's sister via phone call this is significantly different from patient's baseline which is independently mobilizing without device, carrying out own self care routine, and performing community mobility/driving and IADL household management/housework. Does have hired help 6 days a week but is for 4 hours only. Continued OT assessment needed for providing specific discharge disposition recommendations pending progression of medical stability and functional therapy-related improvements. On this date recommend consideration of post acute therapy. Detailed note to follow.    Problem: Occupational Therapy  Goal: Occupational Therapy Goal  Description: Goals to be met by: 2/23/2023     Patient will increase functional independence with ADLs by performing:    Feeding with Set-up Assistance in upright sitting.  UE Dressing with Set-up Assistance.  LE Dressing with Contact Guard Assistance.  Toileting from bedside commode with Contact Guard Assistance for hygiene and clothing management.   Sitting at edge of bed x8 minutes with Stand-by Assistance for balance  Toilet transfer to bedside commode with Standby Assistance.  Functional mobility from bed to the bathroom with CGA and use of least restrictive device  Increased functional strength to 4-/5 grossly for improving strength to participate in daily regimen.    Outcome: Ongoing, Progressing

## 2023-01-26 NOTE — ASSESSMENT & PLAN NOTE
Lactulose 30g TID history but not seen on patient's dispense report. Unsure of how long she has been off of this   Taking Spironolactone 25 mg and Propranolol 20 mg daily at home   Remote hx of Lasix, but not taking it at home   Ammonia 139 on admit  Elevated lipase. Tbili 2.4 on admit.  US with no ascites, did show reversal flow of portal vein    Plan:  -Cont lactulose, will up-titrate as needed  -Continue spironolactone 25mg daily  -Re-started home propranolol 20 daily

## 2023-01-26 NOTE — PHARMACY MED REC
"[facility]           Pharmacy  Admission Medication History     The home medication history was taken by Jovita Rangel.      Medication history obtained from Medications listed below were obtained from: Patient's pharmacy    Based on information gathered for medication list, you may go to "Admission" then "Reconcile Home Medications" tabs to review and/or act upon those items.     The home medication list has been updated by the Pharmacy department.   Please read ALL comments highlighted in yellow.   Please address this information as you see fit.    Feel free to contact us if you have any questions or require assistance.    The medications listed below were removed from the home medication list.  Please reorder if appropriate:    Patient reports NOT TAKING the following medication(s):  Zolpidem cr 12.5mg  Sodium bicarb 650mg  Mag ox 400mg  Lactulose 10g/15ml  Lasix 20mg  Folic acid 1mg  Sarna lotion      No current facility-administered medications on file prior to encounter.     Current Outpatient Medications on File Prior to Encounter   Medication Sig Dispense Refill    ARIPiprazole (ABILIFY) 10 MG Tab Take 10 mg by mouth nightly.      hydrOXYzine (ATARAX) 50 MG tablet Take 50 mg by mouth every evening.      levETIRAcetam (KEPPRA) 500 MG Tab Take 1,000 mg by mouth 2 (two) times daily.      lithium (LITHOTAB) 300 mg tablet Take 300 mg by mouth nightly.      metroNIDAZOLE (FLAGYL) 250 MG tablet Take 250 mg by mouth 2 (two) times a day.      mometasone (ELOCON) 0.1 % ointment Apply topically.      pantoprazole (PROTONIX) 40 MG tablet Take 40 mg by mouth once daily.      propranoloL (INDERAL) 20 MG tablet Take 20 mg by mouth once daily.      QUEtiapine (SEROQUEL) 100 MG Tab Take 100 mg by mouth every evening.      spironolactone (ALDACTONE) 25 MG tablet Take 25 mg by mouth once daily.      zonisamide (ZONEGRAN) 100 MG Cap Take 100 mg by mouth every evening.      multivitamin Tab Take 1 tablet " by mouth once daily.         Please address this information as you see fit.  Feel free to contact us if you have any questions or require assistance.    Jovita Rangel  [phone]                  .

## 2023-01-26 NOTE — ASSESSMENT & PLAN NOTE
Per medication dispense report, patient on Aripiprazole 10 mg, hydroxyzine 50 nightly, lithium 300 daily, seroquel 100 nightly   Per mother, she thinks the psych medications are the cause of her sleepiness and inability to do much during the day  Followed by Dr. Coates at   [place] level wnl on admit but has hx of lithium toxicity     Plan:  -Consult Psych to help with medication management. Appreciate recs  -Will hold Lithium for now and re-start if appropriate  -Plan to continue home meds otherwise if appropriate per Psych recs

## 2023-01-26 NOTE — PT/OT/SLP PROGRESS
Occupational Therapy evaluation/history intake initiated      Patient Name:  Marely Hamilton   MRN:  [id number]    Patient not seen today secondary to Other (Comment) (Dr. Aguayo entered room via teleservice). Will follow-up as able.    OT in room from 11:29-11:41     1/26/2023
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
## 2023-01-26 NOTE — ED NOTES
Assisted with eating, pt. Tolerated <50% of breakfast tray. Incontinent of liquid brown stool. Skin care and linens changed. Repositioned in bed for comfort.

## 2023-01-26 NOTE — ED NOTES
Patient had incontinence bowel episode. Patient cleaned up, new gown on patient. Face washed. Warm blankets covering patient. Side rails up. Call light within reach. Patient alert to self, that she is in a hospital, and that she is here for confusion. Talking in complete sentences. Repositioned on R side for comfort. Moving self in bed. Coccyx red and blanchable. Has not voided on own since in and out catheterized.

## 2023-01-26 NOTE — PLAN OF CARE
01/26/23 1411   Admission   Initial VN Admission Questions Complete   Communication Issues? Technical Issue  (unable to visualize patient)   Shift   Pain Management Interventions care clustered;diversional activity provided;quiet environment facilitated;relaxation techniques promoted   Virtual Nurse - Patient Verbalized Approval Of Camera Use;VN Rounding   Safety/Activity   Safety Promotion/Fall Prevention Fall Risk reviewed with patient/family;instructed to call staff for mobility   Pain/Comfort/Sleep   Preferred Pain Scale number (Numeric Rating Pain Scale)   VN cued into patient's room for introduction with patient's permission. Unable to visualize patient due to camera issues. Patient can visualize me and we can hear each other.  VN role explained and informed patient that VN would be working with bedside nurse and rest of care team.  Fall risk and bed alarm protocol education provided.  Instructed patient to call for assistance.  Patient aware and agreeable; requires reinforcement due to confusion at times. Patient's chart, labs and vital signs reviewed.  Allowed time for questions.  Will continue to be available as needed.

## 2023-01-26 NOTE — PROGRESS NOTES
[facility]  [facility]  Progress Note    Patient Name: Marely Hamilton  MRN: [id number]  Patient Class: IP- Inpatient   Admission Date: 1/25/2023  Length of Stay: 1 days  Attending Physician: Amina Herr MD  Primary Care Provider: Viktor Ross MD        Subjective:     Principal Problem:Hepatic encephalopathy        HPI:  56 yo F with PMH alcoholic cirrhosis, hepatic encephalopathy, seizure, bipolar disorder who presented to [facility] ED via EMS after call for AMS at patient's home. Per EMS report, patient was found with urinary incontinence and confused. Patient reports she has been feeling sick for a few days now. Denies any complaints. Unable to explain any symptoms or what medications she takes. She denies fever/chills, nausea or vomiting, abdominal pain. She denies any alcohol, drug or tobacco use. Due to patient unable to answer questions appropriately, called mother who patient lives with for collateral. She states patient is compliant with her medications and goes to  PCP. However, she states for the past few days she noticed she has been more sleepier than usual and could not communicate at her baseline. She also reports decreased appetite. She states she has been trying to keep patient out of the hospital but she often goes back in for a similar presentation. She states sister who lives in [place] was the one who called EMS for patient due to concerns.     In ED, HR 58, RR16, /60, stable on room air. Ammonia 139. Ethanol wnl. Tbili 2.4. WBC 2.40. PT/INR 15.1/1.5. CTH negative. CXR with no acute abnormality. UA and UDS in process. [facility] consulted for admission for hepatic encephalopathy vs seizure.       Interval History: Overnight, had 2 BM. Mentation improved, now oriented to person and place but not to time. No other complaints other than on-going weakness which is why she has been unable to get out of bed much. She also states some of her psych meds may be  contributing factor as well. Dispo pending Psych recs and PT/OT recs.     Review of Systems   Unable to perform ROS: Mental status change   Constitutional:  Negative for chills and fever.   Respiratory:  Negative for shortness of breath.    Gastrointestinal:  Negative for abdominal pain, diarrhea, nausea and vomiting.   Genitourinary:  Negative for difficulty urinating.   Neurological:  Positive for weakness. Negative for headaches.   Psychiatric/Behavioral:  Positive for confusion.    Objective:     Vital Signs (Most Recent):  Temp: 97.8 °F (36.6 °C) (01/26/23 0030)  Pulse: 69 (01/26/23 0629)  Resp: 13 (01/26/23 0629)  BP: (!) 129/59 (01/26/23 0602)  SpO2: 97 % (01/26/23 0629)   Vital Signs (24h Range):  Temp:  [97.8 °F (36.6 °C)-98.7 °F (37.1 °C)] 97.8 °F (36.6 °C)  Pulse:  [57-70] 69  Resp:  [10-19] 13  SpO2:  [95 %-98 %] 97 %  BP: (103-132)/(57-83) 129/59     Weight: 58.9 kg (129 lb 13.6 oz)  Body mass index is 23 kg/m².    Intake/Output Summary (Last 24 hours) at 1/26/2023 1016  Last data filed at 1/25/2023 2300  Gross per 24 hour   Intake --   Output 500 ml   Net -500 ml      Physical Exam  Vitals reviewed.   Constitutional:       General: She is awake. She is not in acute distress.     Appearance: She is not toxic-appearing.   Eyes:      Extraocular Movements: Extraocular movements intact.   Cardiovascular:      Rate and Rhythm: Regular rhythm. Bradycardia present.      Pulses: Normal pulses.      Heart sounds: Normal heart sounds.   Pulmonary:      Effort: Pulmonary effort is normal. No respiratory distress.      Breath sounds: Normal breath sounds.   Abdominal:      General: Abdomen is flat.      Palpations: Abdomen is soft.      Tenderness: There is no abdominal tenderness. There is no guarding or rebound.   Neurological:      Mental Status: She is disoriented and confused.      Cranial Nerves: Cranial nerves 2-12 are intact. No cranial nerve deficit or facial asymmetry.      Sensory: No sensory deficit.       Motor: Weakness present.      Coordination: Finger-Nose-Finger Test abnormal.      Comments: Oriented only to person and place, not to time  Asterixis present bilaterally  Able to squeeze my fingers, but decreased  strength  Bradykinesia and dysdiadochokinesia  Cranial nerves grossly intact  Able to follow simple commands   Psychiatric:         Behavior: Behavior is cooperative.       Significant Labs: All pertinent labs within the past 24 hours have been reviewed.    Significant Imaging: I have reviewed all pertinent imaging results/findings within the past 24 hours.      Assessment/Plan:      * Hepatic encephalopathy  Lactulose 30g TID history but not seen on patient's dispense report. Unsure of how long she has been off of this   Taking Spironolactone 25 mg and Propranolol 20 mg daily at home   Remote hx of Lasix, but not taking it at home   Ammonia 139 on admit  Elevated lipase. Tbili 2.4 on admit.  US with no ascites, did show reversal flow of portal vein    Plan:  -Cont lactulose, will up-titrate as needed  -Continue spironolactone 25mg daily  -Re-started home propranolol 20 daily       Bipolar 1 disorder  Per medication dispense report, patient on Aripiprazole 10 mg, hydroxyzine 50 nightly, lithium 300 daily, seroquel 100 nightly   Per mother, she thinks the psych medications are the cause of her sleepiness and inability to do much during the day  Followed by Dr. Coates at   [place] level wnl on admit but has hx of lithium toxicity     Plan:  -Consult Psych to help with medication management. Appreciate recs  -Will hold Lithium for now and re-start if appropriate  -Plan to continue home meds otherwise if appropriate per Psych recs      History of seizure  Unknown patient's last seizure  Could be seizure as patient was found by EMS urinary incontinence     Plan:  -Continue home Keppra 500 BID  -Follow up Keppra level      Thrombocytopenia  79 on admit  Chronic hx of thrombocytopenia 2/2 splenomegaly  in setting of alcoholic cirrhosis     Plan:  -Continue to monitor for bleeding        VTE Risk Mitigation (From admission, onward)         Ordered     IP VTE LOW RISK PATIENT  Once         01/25/23 1352     Place sequential compression device  Until discontinued         01/25/23 1352                Discharge Planning   BRAYAN:      Code Status: Full Code   Is the patient medically ready for discharge?:     Reason for patient still in hospital (select all that apply): Consult recommendations, PT / OT recommendations and Pending disposition             Radha Bang MD  [facility], PGY-1  01/26/2023

## 2023-01-26 NOTE — HOSPITAL COURSE
UDS negative. UA with no evidence of UTI. Abdominal US with no ascites. Mentation continued to improve with the initiation of lactulose 30 mg TID with goal BM 2-3 daily achieved. Psych consulted for med recs. After discussion with patient's outpatient psychiatrist Dr. Coates, he recommended giving half dose of home meds while inpatient. Patient to resume full dose of her psych meds on discharge. PT/OT worked with patient due to chronic complaint of weakness. PT/OT recommended post-acute placement. However family and patient declined and preferred patient to go home with Home Health as they have a sitter already that is there 6 days a week. Repeat ammonia on 1/27 showed improvement to 47. Family spoke with patient on phone and agreed that she was at baseline and safe to discharge. Return precautions discussed. All questions answered. Patient verbalized understanding. Medications delivered bedside for patient.    Physical Exam  Vitals reviewed.   Constitutional:       General: She is awake. She is not in acute distress.     Appearance: She is not toxic-appearing.   Eyes:      Extraocular Movements: Extraocular movements intact.   Cardiovascular:      Rate and Rhythm: Normal rate and regular rhythm.      Pulses: Normal pulses.      Heart sounds: Normal heart sounds.   Pulmonary:      Effort: Pulmonary effort is normal. No respiratory distress.      Breath sounds: Normal breath sounds.   Abdominal:      General: Abdomen is flat.      Palpations: Abdomen is soft.      Tenderness: There is no abdominal tenderness. There is no guarding or rebound.   Neurological:      Mental Status: She is alert and oriented to person, place, and time.      Cranial Nerves: Cranial nerves 2-12 are intact. No cranial nerve deficit or facial asymmetry.      Sensory: No sensory deficit.      Motor: Weakness present.      Coordination: Finger-Nose-Finger Test abnormal.      Comments: Asterixis present bilaterally  Able to squeeze my fingers,  but decreased  strength  Bradykinesia and dysdiadochokinesia  Cranial nerves grossly intact  Able to follow simple commands   Psychiatric:         Behavior: Behavior normal. Behavior is cooperative.         Thought Content: Thought content normal.

## 2023-01-26 NOTE — SUBJECTIVE & OBJECTIVE
Interval History: Overnight, had 2 BM. Mentation improved, now oriented to person and place but not to time. No other complaints other than on-going weakness which is why she has been unable to get out of bed much. She also states some of her psych meds may be contributing factor as well. Dispo pending Psych recs and PT/OT recs.     Review of Systems   Unable to perform ROS: Mental status change   Constitutional:  Negative for chills and fever.   Respiratory:  Negative for shortness of breath.    Gastrointestinal:  Negative for abdominal pain, diarrhea, nausea and vomiting.   Genitourinary:  Negative for difficulty urinating.   Neurological:  Positive for weakness. Negative for headaches.   Psychiatric/Behavioral:  Positive for confusion.    Objective:     Vital Signs (Most Recent):  Temp: 97.8 °F (36.6 °C) (01/26/23 0030)  Pulse: 69 (01/26/23 0629)  Resp: 13 (01/26/23 0629)  BP: (!) 129/59 (01/26/23 0602)  SpO2: 97 % (01/26/23 0629)   Vital Signs (24h Range):  Temp:  [97.8 °F (36.6 °C)-98.7 °F (37.1 °C)] 97.8 °F (36.6 °C)  Pulse:  [57-70] 69  Resp:  [10-19] 13  SpO2:  [95 %-98 %] 97 %  BP: (103-132)/(57-83) 129/59     Weight: 58.9 kg (129 lb 13.6 oz)  Body mass index is 23 kg/m².    Intake/Output Summary (Last 24 hours) at 1/26/2023 1016  Last data filed at 1/25/2023 2300  Gross per 24 hour   Intake --   Output 500 ml   Net -500 ml      Physical Exam  Vitals reviewed.   Constitutional:       General: She is awake. She is not in acute distress.     Appearance: She is not toxic-appearing.   Eyes:      Extraocular Movements: Extraocular movements intact.   Cardiovascular:      Rate and Rhythm: Regular rhythm. Bradycardia present.      Pulses: Normal pulses.      Heart sounds: Normal heart sounds.   Pulmonary:      Effort: Pulmonary effort is normal. No respiratory distress.      Breath sounds: Normal breath sounds.   Abdominal:      General: Abdomen is flat.      Palpations: Abdomen is soft.      Tenderness: There is  no abdominal tenderness. There is no guarding or rebound.   Neurological:      Mental Status: She is disoriented and confused.      Cranial Nerves: Cranial nerves 2-12 are intact. No cranial nerve deficit or facial asymmetry.      Sensory: No sensory deficit.      Motor: Weakness present.      Coordination: Finger-Nose-Finger Test abnormal.      Comments: Oriented only to person and place, not to time  Asterixis present bilaterally  Able to squeeze my fingers, but decreased  strength  Bradykinesia and dysdiadochokinesia  Cranial nerves grossly intact  Able to follow simple commands   Psychiatric:         Behavior: Behavior is cooperative.       Significant Labs: All pertinent labs within the past 24 hours have been reviewed.    Significant Imaging: I have reviewed all pertinent imaging results/findings within the past 24 hours.

## 2023-01-26 NOTE — ED NOTES
RN spoke with Dr. Roman with admit team. Patient unable to void and has not had a BM at this time. Bladder scan for >400.  Patient states she feels like she needs to have a BM soon. Orders received.

## 2023-01-26 NOTE — ED NOTES
Patient awake and alert to self. Redirected. In and out catheterized. Patient passing gas. Smear of stool on oxana pad.

## 2023-01-26 NOTE — ED NOTES
Pt. Is tolerating p.o. medications with juice well. She is a/o to person and place. She denies any pain or distress. She states she lives with her mother who falls often and not able to help her. She has hx. Of Hepatic Encephalopathy with altered mental status. She states she is compliant with her medicine regimen at home. She is able to ambulate with moderate weakness at baseline. She is able to give her sisters name(Zaria) and gives permission to give update on the phone. She is occasionally confused to surroundings. She is chronically ill appearing without distress.

## 2023-01-27 LAB
ALBUMIN SERPL BCP-MCNC: 2.8 G/DL (ref 3.5–5.2)
ALP SERPL-CCNC: 90 U/L (ref 55–135)
ALT SERPL W/O P-5'-P-CCNC: 15 U/L (ref 10–44)
AMMONIA PLAS-SCNC: 47 UMOL/L (ref 10–50)
ANION GAP SERPL CALC-SCNC: 5 MMOL/L (ref 8–16)
AST SERPL-CCNC: 34 U/L (ref 10–40)
BASOPHILS # BLD AUTO: 0.02 K/UL (ref 0–0.2)
BASOPHILS NFR BLD: 0.6 % (ref 0–1.9)
BILIRUB SERPL-MCNC: 3.3 MG/DL (ref 0.1–1)
BUN SERPL-MCNC: 10 MG/DL (ref 6–20)
CALCIUM SERPL-MCNC: 9.6 MG/DL (ref 8.7–10.5)
CHLORIDE SERPL-SCNC: 116 MMOL/L (ref 95–110)
CO2 SERPL-SCNC: 18 MMOL/L (ref 23–29)
CREAT SERPL-MCNC: 0.8 MG/DL (ref 0.5–1.4)
DIFFERENTIAL METHOD: ABNORMAL
EOSINOPHIL # BLD AUTO: 0.1 K/UL (ref 0–0.5)
EOSINOPHIL NFR BLD: 4.2 % (ref 0–8)
ERYTHROCYTE [DISTWIDTH] IN BLOOD BY AUTOMATED COUNT: 16.1 % (ref 11.5–14.5)
EST. GFR  (NO RACE VARIABLE): >60 ML/MIN/1.73 M^2
GLUCOSE SERPL-MCNC: 110 MG/DL (ref 70–110)
HCT VFR BLD AUTO: 34.7 % (ref 37–48.5)
HGB BLD-MCNC: 11.8 G/DL (ref 12–16)
IMM GRANULOCYTES # BLD AUTO: 0.01 K/UL (ref 0–0.04)
IMM GRANULOCYTES NFR BLD AUTO: 0.3 % (ref 0–0.5)
INR PPP: 1.6 (ref 0.8–1.2)
LYMPHOCYTES # BLD AUTO: 1.4 K/UL (ref 1–4.8)
LYMPHOCYTES NFR BLD: 42.4 % (ref 18–48)
MAGNESIUM SERPL-MCNC: 1.7 MG/DL (ref 1.6–2.6)
MCH RBC QN AUTO: 32.9 PG (ref 27–31)
MCHC RBC AUTO-ENTMCNC: 34 G/DL (ref 32–36)
MCV RBC AUTO: 97 FL (ref 82–98)
MONOCYTES # BLD AUTO: 0.3 K/UL (ref 0.3–1)
MONOCYTES NFR BLD: 8.2 % (ref 4–15)
NEUTROPHILS # BLD AUTO: 1.5 K/UL (ref 1.8–7.7)
NEUTROPHILS NFR BLD: 44.3 % (ref 38–73)
NRBC BLD-RTO: 0 /100 WBC
PHOSPHATE SERPL-MCNC: 3.2 MG/DL (ref 2.7–4.5)
PLATELET # BLD AUTO: 84 K/UL (ref 150–450)
PMV BLD AUTO: 10.8 FL (ref 9.2–12.9)
POTASSIUM SERPL-SCNC: 3.5 MMOL/L (ref 3.5–5.1)
PROLACTIN SERPL IA-MCNC: 7.6 NG/ML (ref 5.2–26.5)
PROT SERPL-MCNC: 5.8 G/DL (ref 6–8.4)
PROTHROMBIN TIME: 15.7 SEC (ref 9–12.5)
RBC # BLD AUTO: 3.59 M/UL (ref 4–5.4)
SODIUM SERPL-SCNC: 139 MMOL/L (ref 136–145)
WBC # BLD AUTO: 3.3 K/UL (ref 3.9–12.7)

## 2023-01-27 PROCEDURE — 84100 ASSAY OF PHOSPHORUS: CPT

## 2023-01-27 PROCEDURE — 63600175 PHARM REV CODE 636 W HCPCS: Performed by: STUDENT IN AN ORGANIZED HEALTH CARE EDUCATION/TRAINING PROGRAM

## 2023-01-27 PROCEDURE — 97535 SELF CARE MNGMENT TRAINING: CPT

## 2023-01-27 PROCEDURE — 97116 GAIT TRAINING THERAPY: CPT

## 2023-01-27 PROCEDURE — 85610 PROTHROMBIN TIME: CPT

## 2023-01-27 PROCEDURE — 85025 COMPLETE CBC W/AUTO DIFF WBC: CPT

## 2023-01-27 PROCEDURE — 82140 ASSAY OF AMMONIA: CPT | Performed by: STUDENT IN AN ORGANIZED HEALTH CARE EDUCATION/TRAINING PROGRAM

## 2023-01-27 PROCEDURE — 94761 N-INVAS EAR/PLS OXIMETRY MLT: CPT

## 2023-01-27 PROCEDURE — 25000003 PHARM REV CODE 250: Performed by: STUDENT IN AN ORGANIZED HEALTH CARE EDUCATION/TRAINING PROGRAM

## 2023-01-27 PROCEDURE — 97530 THERAPEUTIC ACTIVITIES: CPT

## 2023-01-27 PROCEDURE — 99900035 HC TECH TIME PER 15 MIN (STAT)

## 2023-01-27 PROCEDURE — 97161 PT EVAL LOW COMPLEX 20 MIN: CPT

## 2023-01-27 PROCEDURE — 11000001 HC ACUTE MED/SURG PRIVATE ROOM

## 2023-01-27 PROCEDURE — 63600175 PHARM REV CODE 636 W HCPCS

## 2023-01-27 PROCEDURE — 36415 COLL VENOUS BLD VENIPUNCTURE: CPT

## 2023-01-27 PROCEDURE — 83735 ASSAY OF MAGNESIUM: CPT

## 2023-01-27 PROCEDURE — 25000003 PHARM REV CODE 250

## 2023-01-27 PROCEDURE — 80053 COMPREHEN METABOLIC PANEL: CPT

## 2023-01-27 PROCEDURE — 25000003 PHARM REV CODE 250: Performed by: PSYCHIATRY & NEUROLOGY

## 2023-01-27 RX ORDER — LACTULOSE 10 G/15ML
30 SOLUTION ORAL; RECTAL 3 TIMES DAILY
Qty: 3784 ML | Refills: 3 | Status: ON HOLD | OUTPATIENT
Start: 2023-01-27 | End: 2023-02-12 | Stop reason: SDUPTHER

## 2023-01-27 RX ORDER — POTASSIUM CHLORIDE 20 MEQ/1
40 TABLET, EXTENDED RELEASE ORAL ONCE
Status: DISCONTINUED | OUTPATIENT
Start: 2023-01-27 | End: 2023-01-27

## 2023-01-27 RX ORDER — MAGNESIUM SULFATE HEPTAHYDRATE 40 MG/ML
2 INJECTION, SOLUTION INTRAVENOUS ONCE
Status: COMPLETED | OUTPATIENT
Start: 2023-01-27 | End: 2023-01-27

## 2023-01-27 RX ADMIN — POTASSIUM BICARBONATE 25 MEQ: 978 TABLET, EFFERVESCENT ORAL at 12:01

## 2023-01-27 RX ADMIN — ENOXAPARIN SODIUM 40 MG: 40 INJECTION SUBCUTANEOUS at 06:01

## 2023-01-27 RX ADMIN — LEVETIRACETAM 500 MG: 500 TABLET, FILM COATED ORAL at 09:01

## 2023-01-27 RX ADMIN — LITHIUM CARBONATE 150 MG: 150 CAPSULE, GELATIN COATED ORAL at 09:01

## 2023-01-27 RX ADMIN — LACTULOSE 30 G: 10 SOLUTION ORAL at 09:01

## 2023-01-27 RX ADMIN — MAGNESIUM SULFATE 2 G: 2 INJECTION INTRAVENOUS at 09:01

## 2023-01-27 RX ADMIN — POTASSIUM BICARBONATE 25 MEQ: 978 TABLET, EFFERVESCENT ORAL at 09:01

## 2023-01-27 RX ADMIN — PROPRANOLOL HYDROCHLORIDE 20 MG: 10 TABLET ORAL at 09:01

## 2023-01-27 RX ADMIN — SPIRONOLACTONE 25 MG: 25 TABLET ORAL at 09:01

## 2023-01-27 RX ADMIN — ARIPIPRAZOLE 5 MG: 5 TABLET ORAL at 09:01

## 2023-01-27 RX ADMIN — LACTULOSE 30 G: 10 SOLUTION ORAL at 03:01

## 2023-01-27 RX ADMIN — QUETIAPINE FUMARATE 50 MG: 25 TABLET ORAL at 09:01

## 2023-01-27 NOTE — SUBJECTIVE & OBJECTIVE
Interval History: Overnight, no acute events. 2 BM yesterday. This AM, mentation continuing to improve - oriented to person, place and year. Ammonia level now normal. Dispo pending PT/OT recs.     Review of Systems   Constitutional:  Negative for chills and fever.   Respiratory:  Negative for shortness of breath.    Gastrointestinal:  Negative for abdominal pain, diarrhea, nausea and vomiting.   Genitourinary:  Negative for difficulty urinating.   Neurological:  Positive for weakness. Negative for headaches.   Psychiatric/Behavioral:  Positive for confusion.    Objective:     Vital Signs (Most Recent):  Temp: 97.9 °F (36.6 °C) (01/27/23 1221)  Pulse: 75 (01/27/23 1239)  Resp: 18 (01/27/23 1221)  BP: (!) 98/54 (01/27/23 1221)  SpO2: 97 % (01/27/23 1221)   Vital Signs (24h Range):  Temp:  [97.6 °F (36.4 °C)-98.6 °F (37 °C)] 97.9 °F (36.6 °C)  Pulse:  [70-86] 75  Resp:  [16-18] 18  SpO2:  [93 %-97 %] 97 %  BP: ()/(54-69) 98/54     Weight: 58.9 kg (129 lb 13.6 oz)  Body mass index is 23 kg/m².    Intake/Output Summary (Last 24 hours) at 1/27/2023 1312  Last data filed at 1/27/2023 0800  Gross per 24 hour   Intake 480 ml   Output --   Net 480 ml      Physical Exam  Vitals reviewed.   Constitutional:       General: She is awake. She is not in acute distress.     Appearance: She is not toxic-appearing.   Eyes:      Extraocular Movements: Extraocular movements intact.   Cardiovascular:      Rate and Rhythm: Normal rate and regular rhythm.      Pulses: Normal pulses.      Heart sounds: Normal heart sounds.   Pulmonary:      Effort: Pulmonary effort is normal. No respiratory distress.      Breath sounds: Normal breath sounds.   Abdominal:      General: Abdomen is flat.      Palpations: Abdomen is soft.      Tenderness: There is no abdominal tenderness. There is no guarding or rebound.   Neurological:      Mental Status: She is alert and oriented to person, place, and time.      Cranial Nerves: Cranial nerves 2-12 are  intact. No cranial nerve deficit or facial asymmetry.      Sensory: No sensory deficit.      Motor: Weakness present.      Coordination: Finger-Nose-Finger Test abnormal.      Comments: Asterixis present bilaterally  Able to squeeze my fingers, but decreased  strength  Bradykinesia and dysdiadochokinesia  Cranial nerves grossly intact  Able to follow simple commands   Psychiatric:         Behavior: Behavior normal. Behavior is cooperative.         Thought Content: Thought content normal.       Significant Labs: All pertinent labs within the past 24 hours have been reviewed.    Significant Imaging: I have reviewed all pertinent imaging results/findings within the past 24 hours.

## 2023-01-27 NOTE — PROGRESS NOTES
[facility] Medicine  Progress Note    Patient Name: Marely Hamilton  MRN: [id number]  Patient Class: IP- Inpatient   Admission Date: 1/25/2023  Length of Stay: 2 days  Attending Physician: Edu Paz III, MD  Primary Care Provider: Viktor Ross MD        Subjective:     Principal Problem:Hepatic encephalopathy        HPI:  56 yo F with PMH alcoholic cirrhosis, hepatic encephalopathy, seizure, bipolar disorder who presented to [facility] ED via EMS after call for AMS at patient's home. Per EMS report, patient was found with urinary incontinence and confused. Patient reports she has been feeling sick for a few days now. Denies any complaints. Unable to explain any symptoms or what medications she takes. She denies fever/chills, nausea or vomiting, abdominal pain. She denies any alcohol, drug or tobacco use. Due to patient unable to answer questions appropriately, called mother who patient lives with for collateral. She states patient is compliant with her medications and goes to  PCP. However, she states for the past few days she noticed she has been more sleepier than usual and could not communicate at her baseline. She also reports decreased appetite. She states she has been trying to keep patient out of the hospital but she often goes back in for a similar presentation. She states sister who lives in [place] was the one who called EMS for patient due to concerns.     In ED, HR 58, RR16, /60, stable on room air. Ammonia 139. Ethanol wnl. Tbili 2.4. WBC 2.40. PT/INR 15.1/1.5. CTH negative. CXR with no acute abnormality. UA and UDS in process. [facility] consulted for admission for hepatic encephalopathy vs seizure.       Interval History: Overnight, no acute events. 2 BM yesterday. This AM, mentation continuing to improve - oriented to person, place and year. Ammonia level now normal. Dispo pending PT/OT recs.     Review of Systems   Constitutional:  Negative for chills and fever.    Respiratory:  Negative for shortness of breath.    Gastrointestinal:  Negative for abdominal pain, diarrhea, nausea and vomiting.   Genitourinary:  Negative for difficulty urinating.   Neurological:  Positive for weakness. Negative for headaches.   Psychiatric/Behavioral:  Positive for confusion.    Objective:     Vital Signs (Most Recent):  Temp: 97.9 °F (36.6 °C) (01/27/23 1221)  Pulse: 75 (01/27/23 1239)  Resp: 18 (01/27/23 1221)  BP: (!) 98/54 (01/27/23 1221)  SpO2: 97 % (01/27/23 1221)   Vital Signs (24h Range):  Temp:  [97.6 °F (36.4 °C)-98.6 °F (37 °C)] 97.9 °F (36.6 °C)  Pulse:  [70-86] 75  Resp:  [16-18] 18  SpO2:  [93 %-97 %] 97 %  BP: ()/(54-69) 98/54     Weight: 58.9 kg (129 lb 13.6 oz)  Body mass index is 23 kg/m².    Intake/Output Summary (Last 24 hours) at 1/27/2023 1312  Last data filed at 1/27/2023 0800  Gross per 24 hour   Intake 480 ml   Output --   Net 480 ml      Physical Exam  Vitals reviewed.   Constitutional:       General: She is awake. She is not in acute distress.     Appearance: She is not toxic-appearing.   Eyes:      Extraocular Movements: Extraocular movements intact.   Cardiovascular:      Rate and Rhythm: Normal rate and regular rhythm.      Pulses: Normal pulses.      Heart sounds: Normal heart sounds.   Pulmonary:      Effort: Pulmonary effort is normal. No respiratory distress.      Breath sounds: Normal breath sounds.   Abdominal:      General: Abdomen is flat.      Palpations: Abdomen is soft.      Tenderness: There is no abdominal tenderness. There is no guarding or rebound.   Neurological:      Mental Status: She is alert and oriented to person, place, and time.      Cranial Nerves: Cranial nerves 2-12 are intact. No cranial nerve deficit or facial asymmetry.      Sensory: No sensory deficit.      Motor: Weakness present.      Coordination: Finger-Nose-Finger Test abnormal.      Comments: Asterixis present bilaterally  Able to squeeze my fingers, but decreased   strength  Bradykinesia and dysdiadochokinesia  Cranial nerves grossly intact  Able to follow simple commands   Psychiatric:         Behavior: Behavior normal. Behavior is cooperative.         Thought Content: Thought content normal.       Significant Labs: All pertinent labs within the past 24 hours have been reviewed.    Significant Imaging: I have reviewed all pertinent imaging results/findings within the past 24 hours.      Assessment/Plan:      * Hepatic encephalopathy  Lactulose 30g TID history but not seen on patient's dispense report. Unsure of how long she has been off of this   Taking Spironolactone 25 mg and Propranolol 20 mg daily at home   Remote hx of Lasix, but not taking it at home   Ammonia 139 on admit  Elevated lipase. Tbili 2.4 on admit.  US with no ascites, did show reversal flow of portal vein    Plan:  -Cont lactulose 30g TID for goal 2-3 BM daily, can adjust as needed  -Continue spironolactone 25mg daily  -Re-started home propranolol 20 daily     Bipolar 1 disorder  Per medication dispense report, patient on Aripiprazole 10 mg, hydroxyzine 50 nightly, lithium 300 daily, seroquel 100 nightly   Per mother, she thinks the psych medications are the cause of her sleepiness and inability to do much during the day  Followed by Dr. Coates   Lithium level wnl on admit but has hx of lithium toxicity     Plan:  -Consult Psych to help with medication management. Appreciate recs. After discussion with patient's longstanding outpatient psychiatrist (Dewey Coates MD), will continue a reduced regimen of Lithium 150 mg PO QHS for mood, Seroquel 50 mg PO QHS for mood, and Abilify 5 mg PO daily for mood while patient is recovering from hepatic encephalopathy / delirium (discussed risks/benefits/alt vs no treatment with patient).  -Patient to go home and resume full dose of medications on discharge      History of seizure  Unknown patient's last seizure  Could be seizure as patient was found by EMS urinary  incontinence     Plan:  -Continue home Keppra 500 BID  -Follow up Keppra level      Thrombocytopenia  79 on admit  Chronic hx of thrombocytopenia 2/2 splenomegaly in setting of alcoholic cirrhosis     Plan:  -Continue to monitor for bleeding        VTE Risk Mitigation (From admission, onward)         Ordered     enoxaparin injection 40 mg  Daily         01/26/23 1424     IP VTE LOW RISK PATIENT  Once         01/25/23 1352     Place sequential compression device  Until discontinued         01/25/23 1352                Discharge Planning   BRAYAN:      Code Status: Full Code   Is the patient medically ready for discharge?:     Reason for patient still in hospital (select all that apply): PT / OT recommendations and Pending disposition             Radha Bang MD  [facility], PGY-1  01/27/2023

## 2023-01-27 NOTE — PT/OT/SLP PROGRESS
Occupational Therapy   Treatment    Name: Marely Hamilton  MRN: [id number]  Admitting Diagnosis:  Hepatic encephalopathy       Recommendations:     Discharge Recommendations: other (see comments) (post acute therapy at this time)  Discharge Equipment Recommendations:  other (see comments) (to be determined)  Barriers to discharge:  Other (Comment) (decreased mentation; fall risk)    Assessment:     Marely Hamilton is a 57 y.o. female with a medical diagnosis of Hepatic encephalopathy.  She presents with ..The primary encounter diagnosis was Hepatic encephalopathy. Diagnoses of Altered mental status and Chest pain were also pertinent to this visit.  . Performance deficits affecting function are weakness, impaired endurance, impaired cognition, impaired sensation, impaired coordination, impaired self care skills, decreased upper extremity function, decreased lower extremity function, impaired functional mobility, gait instability, impaired balance, decreased safety awareness.     Improved progress and mentation on this date prior to yesterday's initial evaluation. Patient oriented to self, place, follow commands with improved accuracy, and partake in functional conversation. Yesterday unable to legibly write name vs today able to write name. Clock drawing screen: impaired (able to recognize 12 #s of clock but accuracy < 25% in drawing own clock). Cotreatment with PT to maximize mobility outcomes. BUE strength improved to 3+/5 to 4-/5 grossly. Less assist for lower body dressing. Able to participate in functional mobility with bilateral handheld assist and moderate lifting assist (2 therapists present for safety 2/2 periodically decreased attention). At this time continued therapy needed. Recommending post acute therapy at this time; will update discharge disposition and DME recommendations as patient progresses.     Rehab Prognosis:  Good; patient would benefit from acute skilled OT services to address these deficits  and reach maximum level of function.       Plan:     Patient to be seen 3 x/week to address the above listed problems via self-care/home management, therapeutic activities, therapeutic exercises  Plan of Care Expires: 02/23/23  Plan of Care Reviewed with: patient    Subjective     Pain/Comfort:  Pain Rating 1: 0/10  Pain Addressed 1: Pre-medicate for activity, Cessation of Activity, Nurse notified  Pain Rating Post-Intervention 1: 0/10    Objective:     Communicated with: nursing prior to session.  Patient found HOB elevated with bed alarm, peripheral IV, telemetry upon OT entry to room.    General Precautions: Standard, fall    Orthopedic Precautions:N/A  Braces: N/A  Respiratory Status: Room air     Occupational Performance:     Bed Mobility:    Patient completed rolling bilaterally with minimal assist.  Patient performed supine to sit with CGA.  Patient performed scooting to eob with min assist  Increased time, and visual cues provided for technique. Instructed for reaching for bed rail.     Functional Mobility/Transfers:  Patient completed Sit <> Stand Transfer with CGA to Min assist x3 trials  with  Bilateral handheld assist   Functional Mobility: with assist of 2 therapists patient completed stepping in place with balance assist. Patient with posterior leaning and lowering self to bed. Brief attempt to place walker in front to improve stability, but due  to confusion, resumed bilateral handheld assist of two thearpists to facilitate safe, but effortful mobility from bed to alongside bed to recliner. Very small steps with looking down at floor and intermittently closing eyes. Continuous cues for forward ahead scanning.    Activities of Daily Living:  Upper Body Dressing: minimum assistance ; increased time ; requires prompting to facilitate recognition for changing clothing  Lower Body Dressing: moderate assistance ; guided to sit eob with assist to thread on followed by return to supine and able to perform  assisted bridging  Toileting: recognized need to be changed; total assist for hygiene although does attempt to assist but poor thoroughness    [facility] 6 Click ADL: 17    Treatment & Education:  Patient educated on role of OT. Cotreat with PT to optimize outcomes. Orientation questions and additional inquiries asked. Patient on this date able to make needs known and notified therapists that she was soiled and needing assist for getting cleaned up. Instructed in rolling multiple times for hygiene for pericare followed by dressing as above. Patient transitioned to eob. Performed static stand with assist. Performed functional mobility to recliner as above. Able to legibly write name on command. Able to recognize there are 12 numbers on a clock but unable to be assisted in drawing. Patient able to without cueing demonstrate recognition for how to use call light to call for assist.    Patient left HOB elevated with all lines intact, call button in reach, chair alarm on, and nursing notified    GOALS:   Multidisciplinary Problems       Occupational Therapy Goals          Problem: Occupational Therapy    Goal Priority Disciplines Outcome Interventions   Occupational Therapy Goal     OT, PT/OT Ongoing, Progressing    Description: Goals to be met by: 2/23/2023     Patient will increase functional independence with ADLs by performing:    Feeding with Set-up Assistance in upright sitting.  UE Dressing with Set-up Assistance.  LE Dressing with Contact Guard Assistance.  Toileting from bedside commode with Contact Guard Assistance for hygiene and clothing management.   Sitting at edge of bed x8 minutes with Stand-by Assistance for balance  Toilet transfer to bedside commode with Standby Assistance.  Functional mobility from bed to the bathroom with CGA and use of least restrictive device  Increased functional strength to 4-/5 grossly for improving strength to participate in daily regimen.                         Time Tracking:      OT Date of Treatment: 01/27/23  OT Start Time: 1116  OT Stop Time: 1155  OT Total Time (min): 39 min ; cotx with PT to maximize outcomes    Billable Minutes:Self Care/Home Management 19 min  Therapeutic Activity 20 min    OT/DC: OT          1/27/2023

## 2023-01-27 NOTE — PT/OT/SLP EVAL
Physical Therapy Evaluation and Treatment    Patient Name:  Marely Hamilton   MRN:  [id number]    Recommendations:     Discharge Recommendations:  (post acute placement / therapy)   Discharge Equipment Recommendations:  (TBD)   Barriers to discharge:  increased assist needed, fall risk    Assessment:     Marely Hamilton is a 57 y.o. female admitted with a medical diagnosis of Hepatic encephalopathy.  She presents with the following impairments/functional limitations: weakness, decreased upper extremity function, gait instability, decreased lower extremity function, impaired balance, impaired endurance, impaired self care skills, impaired functional mobility, decreased safety awareness, impaired cognition, decreased coordination, impaired coordination, impaired cardiopulmonary response to activity .Pt reporting improvement in mentation this date. Pt AAO x 3 this date. However, pt requires increased assistance for functional mobility and is not safe to d/c home at this time. Pt requires ModA x 1 (and assist of another for safety) for ambulating ~12 ft with B HHA. Pt demonstrating increased difficulty with motor planning and sequencing. Pt presenting with decreased alertness while ambulating and needing cueing to keep eyes open. Recommending post acute placement / therapy at this time.     Rehab Prognosis: Good; patient would benefit from acute skilled PT services to address these deficits and reach maximum level of function.    Recent Surgery: * No surgery found *      Plan:     During this hospitalization, patient to be seen 5 x/week to address the identified rehab impairments via gait training, therapeutic activities, therapeutic exercises, neuromuscular re-education and progress toward the following goals:    Plan of Care Expires:  02/27/23    Subjective     Chief Complaint: None stated  Patient/Family Comments/goals: pt reports improvement in mentation this date  Pain/Comfort:  Pain Rating 1: 0/10  Pain Rating  Post-Intervention 1: 0/10    Patients cultural, spiritual, Anabaptism conflicts given the current situation: no    Living Environment:  Pt lives with elderly mom in a SSH, 1 [place] without HR, and WIS with BIS  Prior to admission, patients level of function was Mod I / Independent with ADL's and mobility; pt drives and grocery shops; pt with hx of Bipolar disorder. Patient and her mom do have hired help that come in from 9am - 1pm 6 days a week that assist her mom and perform heavier housework. Equipment used at home: walker, rolling, bedside commode, wheelchair.  DME owned (not currently used): rolling walker, bedside commode, and wheelchair.  Upon discharge, patient will have assistance from limited physical assist available;  does have hired help 4 hours a day 6 days a week for mother and housework but unsure if able to assist the pt.    Objective:     Communicated with nsg prior to session.  Patient found HOB elevated with telemetry, peripheral IV  upon PT entry to room.    General Precautions: Standard, fall  Orthopedic Precautions:N/A   Braces: N/A  Respiratory Status: Room air    Exams:  Cognitive Exam:  Patient is oriented to Person, Place, and Situation  Postural Exam:  Patient presented with the following abnormalities:    -       Rounded shoulders  Skin Integrity/Edema:      -       Skin integrity: Visible skin intact  -       Edema: None noted BLE  BLE ROM: WFL  BLE Strength: grossly 4- to 4+/5 and generalized weakness noted upon functional assessment     Functional Mobility:  Bed Mobility:     Rolling Left:  minimum assistance  Rolling Right: minimum assistance  Scooting: contact guard assistance  Supine to Sit: contact guard assistance  Sit to Supine: contact guard assistance and minimum assistance  Transfers:     Sit to Stand:  minimum assistance with B hand-held assist  Bed to Chair: moderate assistance with B hand-held assist  using  Step Transfer  Gait: Pt requires ModA x 1 (and assist of another for  safety) for ambulating ~12 ft with B HHA. Pt demonstrating increased difficulty with motor planning and sequencing. Max cueing for sequencing and navigating - pt demonstrating increased hip flexion and marching while taking steps, decreased step length B and increased flexion at B knees and trunk; pt unable to manage use of RW safely upon attempt      AM-PAC 6 CLICK MOBILITY  Total Score:14       Treatment & Education:  Pt educated on role of PT/POC and pt agreeable to participate  Pt soiled in BM upon arrival.  B rolling and hygiene performed   Pt transitioned sitting EOB  Performed 3 sit<>stands with B HHA and Darwin 2/2 pt posterior leaning against bed and increased flexed posture  Attempted 2-3 steps with use of RW ,however, pt unable to safely manage RW  Pt ambulated with B HHA as reported above to chair  Pt presenting with decreased alertness while ambulating and needing cueing to keep eyes open. Pt denies any dizziness.   Pt requiring cueing for safe approach to chair, hand placement, and controlled descent to chair.  Discussed pt's need for more therapy prior to d/c.  B LE elevated and pt educated on UE/LE exercises and instructed to call for assist from staff when OOB.     Patient left up in chair with all lines intact, call button in reach, chair alarm on, and nsg notified.    GOALS:   Multidisciplinary Problems       Physical Therapy Goals          Problem: Physical Therapy    Goal Priority Disciplines Outcome Goal Variances Interventions   Physical Therapy Goal     PT, PT/OT Ongoing, Progressing     Description: Goals to be met by: 23     Patient will increase functional independence with mobility by performin. Supine to sit with Stand-by Assistance  2. Sit to supine with Stand-by Assistance  3. Sit to stand transfer with Stand-by Assistance  4. Bed to chair transfer with Stand-by Assistance using LRAD  5. Gait  x 100 feet with Stand-by Assistance using LRAD.   6. Ascend/descend 1 step with  either HHA / CGA or use of assistive device.                          History:     Past Medical History:   Diagnosis Date    Addiction to drug     Alcohol abuse     Alcohol abuse, in remission 6/15/2015    14.5 weeks ago; AA weekly    Anemia     Anxiety 6/15/2015    Behavioral problem     Bipolar disorder     Bipolar disorder in remission 6/15/2015    Cirrhosis, Laennec's 6/15/2015    Depression     Encounter for blood transfusion     Epistaxis 6/15/2015    Fatigue     Glaucoma     Hematuria     Hepatic encephalopathy 6/15/2015    Hepatic enlargement     History of psychiatric care     History of psychiatric hospitalization     History of seizure 6/15/2015    1    hx of intentional Tylenol overdose 6/15/2015    2005- situational and hx of bipolar    Hx of psychiatric care     Jeana     Osteoarthritis     Other ascites 6/15/2015    Psychiatric exam requested by authority     Psychiatric problem     Psychosis 9/26/2019    Renal disorder     Seizures     Self-harming behavior     Suicide attempt     Therapy     Thrombocytopenia 6/15/2015       Past Surgical History:   Procedure Laterality Date    COSMETIC SURGERY      ESOPHAGOGASTRODUODENOSCOPY         Time Tracking:     PT Received On: 01/27/23  PT Start Time: 1116     PT Stop Time: 1155  PT Total Time (min): 39 min     Billable Minutes: Evaluation 10, Gait Training 15, and Therapeutic Activity 14 (cotx with OT)      01/27/2023

## 2023-01-27 NOTE — ASSESSMENT & PLAN NOTE
Per medication dispense report, patient on Aripiprazole 10 mg, hydroxyzine 50 nightly, lithium 300 daily, seroquel 100 nightly   Per mother, she thinks the psych medications are the cause of her sleepiness and inability to do much during the day  Followed by Dr. Coates   Lithium level wnl on admit but has hx of lithium toxicity     Plan:  -Consult Psych to help with medication management. Appreciate recs. After discussion with patient's longstanding outpatient psychiatrist (Dewey Coates MD), will continue a reduced regimen of Lithium 150 mg PO QHS for mood, Seroquel 50 mg PO QHS for mood, and Abilify 5 mg PO daily for mood while patient is recovering from hepatic encephalopathy / delirium (discussed risks/benefits/alt vs no treatment with patient).  -Patient to go home and resume full dose of medications on discharge

## 2023-01-27 NOTE — PLAN OF CARE
Problem: Physical Therapy  Goal: Physical Therapy Goal  Description: Goals to be met by: 23     Patient will increase functional independence with mobility by performin. Supine to sit with Stand-by Assistance  2. Sit to supine with Stand-by Assistance  3. Sit to stand transfer with Stand-by Assistance  4. Bed to chair transfer with Stand-by Assistance using LRAD  5. Gait  x 100 feet with Stand-by Assistance using LRAD.   6. Ascend/descend 1 step with either HHA / CGA or use of assistive device.     Outcome: Ongoing, Progressing     PT Eval completed, note to follow. Pt reporting improvement in mentation this date. Pt AAO x 3 this date. However, pt requires increased assistance for functional mobility and is not safe to d/c home at this time. Pt requires ModA x 1 (and assist of another for safety) for ambulating ~12 ft with B HHA. Pt demonstrating increased difficulty with motor planning and sequencing. Pt presenting with decreased alertness while ambulating and needing cueing to keep eyes open. Recommending post acute placement / therapy at this time.

## 2023-01-27 NOTE — ASSESSMENT & PLAN NOTE
Lactulose 30g TID history but not seen on patient's dispense report. Unsure of how long she has been off of this   Taking Spironolactone 25 mg and Propranolol 20 mg daily at home   Remote hx of Lasix, but not taking it at home   Ammonia 139 on admit  Elevated lipase. Tbili 2.4 on admit.  US with no ascites, did show reversal flow of portal vein    Plan:  -Cont lactulose 30g TID for goal 2-3 BM daily, can adjust as needed  -Continue spironolactone 25mg daily  -Re-started home propranolol 20 daily

## 2023-01-27 NOTE — PLAN OF CARE
went to meet with patient today. Patient reports she lives at home with her mother. She has a wheelchair (if needed), walker, and bedside commode at home. She receives sitter services from [facility] (separate from Home Health). Her caregiver is there 9 am-1 pm/6 days a week. She does not drive, but the caregiver will provide transport to appointments. Patient stated it was ok if I contacted her sister as well.      contacted patient's sister. She confirmed the above information. Patient's sitter/caregiver services provide non-medical services such as grocery shopping, laundry, transport to appointments, bathing if needed, etc. They pay for this service out of pocket. Her sister also stated she was going to pay for a NP at home again to see patient to check her meds, etc. (She has done this before). I update her on therapy recommendations. She told me that she believes her sister will want to go home. She will make sure adequate support is set up. She is agreeable for  to set up Home Health with [facility] and request NP at home program follow-up.      and Supervisor Fernando went to meet with patient in room. We again dicussed therapy recommendations and patient wants to go home with home health. She is refusing placement.     I contacted patient's sister and notified her. I also spoke with Dr. Cherry. Plan for patient to discharge tomorrow with  services. Her sister reports to call her (sister lives out of state) and she can contact sitter services to transport home. If sitter unable to transport home (after 1 pm), CM/SW will need to set up ride home for patient.    [facility] has accepted patient for Home Health. Patient Choice Form signed.       Patient Contacts    Name Relation Home Work Mobile   Zaria Hamilton Sister [phone]     Julian Chou Other   [phone]   Anastasia Hamilton Mother   [phone]      [facility] [place] [facility]  [facility]  Home Health Services, Home Therapy Services, Home Living Aide Services [phone] [address] [place] [address]     Next Steps: Follow up  Appointment:   Instructions: Home Health [employer]    Paz ANDERSON Chairs, NP Paz ANDERSON Chairs, NP  Hospitalist [phone] [address] [place] [address]     Next Steps: Follow up  Appointment:   Instructions: [facility] (NP hospital follow-up at home appointment requested).      01/27/23 1639   Discharge Assessment   Assessment Type Discharge Planning Assessment   Confirmed/corrected address, phone number and insurance Yes   Confirmed Demographics Correct on Facesheet   Source of Information patient;family;health record   People in Home parent(s)   Facility Arrived From: Home   Do you expect to return to your current living situation? Yes   Do you have help at home or someone to help you manage your care at home? Yes   Prior to hospitilization cognitive status: Alert/Oriented   Current cognitive status: Alert/Oriented   Walking or Climbing Stairs ambulation difficulty, requires equipment   Equipment Currently Used at Home walker, rolling;bedside commode   Do you currently have service(s) that help you manage your care at home? Yes   Name and Contact number of agency [facility] [employer] 9 am-1 pm/6 days a week   Do you take prescription medications? Yes   Do you have prescription coverage? Yes   Do you have any problems affording any of your prescribed medications? No   Is the patient taking medications as prescribed? yes   Who is going to help you get home at discharge? Patient will likely need transport home.   How do you get to doctors appointments? other (see comments)  ([facility] [employer] [facility])   Are you on dialysis? No   Do you take coumadin? No   Discharge Plan A Home with family;Home Health   Discharge Plan B Skilled Nursing Facility   DME Needed Upon Discharge  none   Discharge Plan discussed with: Patient;Sibling    Discharge Barriers Identified Mental illness   Physical Activity   On average, how many days per week do you engage in moderate to strenuous exercise (like a brisk walk)? Patient refu   On average, how many minutes do you engage in exercise at this level? Patient refu   Financial Resource Strain   How hard is it for you to pay for the very basics like food, housing, medical care, and heating? Not hard   Housing Stability   In the last 12 months, was there a time when you were not able to pay the mortgage or rent on time? N   In the last 12 months, was there a time when you did not have a steady place to sleep or slept in a shelter (including now)? N   Transportation Needs   In the past 12 months, has lack of transportation kept you from medical appointments or from getting medications? no   In the past 12 months, has lack of transportation kept you from meetings, work, or from getting things needed for daily living? No   Food Insecurity   Within the past 12 months, you worried that your food would run out before you got the money to buy more. Never true   Within the past 12 months, the food you bought just didn't last and you didn't have money to get more. Never true   Stress   Do you feel stress - tense, restless, nervous, or anxious, or unable to sleep at night because your mind is troubled all the time - these days? Patient refu   Social Connections   In a typical week, how many times do you talk on the phone with family, friends, or neighbors? More than 3   How often do you get together with friends or relatives? More than 3   How often do you attend Methodist or Synagogue services? Patient refu   Do you belong to any clubs or organizations such as Methodist groups, unions, fraternal or athletic groups, or school groups? Patient refu   How often do you attend meetings of the clubs or organizations you belong to? Patient refu   Are you , , , , never , or living with a partner?  Patient refu   Alcohol Use   Q1: How often do you have a drink containing alcohol? Patient refu   Q2: How many drinks containing alcohol do you have on a typical day when you are drinking? Patient refu   Q3: How often do you have six or more drinks on one occasion? Patient refu     Kanwal Caban RN    [phone]

## 2023-01-27 NOTE — PLAN OF CARE
Improved progress and mentation on this date prior to yesterday's initial evaluation. Patient oriented to self, place, follow commands with improved accuracy, and partake in functional conversation. Yesterday unable to legibly write name vs today able to write name. Clock drawing screen: impaired (able to recognize 12 #s of clock but accuracy < 25% in drawing own clock). Cotreatment with PT to maximize mobility outcomes. BUE strength improved to 3+/5 to 4-/5 grossly. Less assist for lower body dressing. Able to participate in functional mobility with bilateral handheld assist and moderate lifting assist (2 therapists present for safety 2/2 periodically decreased attention). At this time continued therapy needed. Recommending post acute therapy at this time; will update discharge disposition and DME recommendations as patient progresses. Note to follow.    Problem: Occupational Therapy  Goal: Occupational Therapy Goal  Description: Goals to be met by: 2/23/2023     Patient will increase functional independence with ADLs by performing:    Feeding with Set-up Assistance in upright sitting.  UE Dressing with Set-up Assistance.  LE Dressing with Contact Guard Assistance.  Toileting from bedside commode with Contact Guard Assistance for hygiene and clothing management.   Sitting at edge of bed x8 minutes with Stand-by Assistance for balance  Toilet transfer to bedside commode with Standby Assistance.  Functional mobility from bed to the bathroom with CGA and use of least restrictive device  Increased functional strength to 4-/5 grossly for improving strength to participate in daily regimen.    Outcome: Ongoing, Progressing

## 2023-01-28 VITALS
BODY MASS INDEX: 23.01 KG/M2 | DIASTOLIC BLOOD PRESSURE: 54 MMHG | SYSTOLIC BLOOD PRESSURE: 96 MMHG | WEIGHT: 129.88 LBS | OXYGEN SATURATION: 93 % | TEMPERATURE: 98 F | RESPIRATION RATE: 20 BRPM | HEART RATE: 77 BPM | HEIGHT: 63 IN

## 2023-01-28 LAB
ALBUMIN SERPL BCP-MCNC: 2.5 G/DL (ref 3.5–5.2)
ALP SERPL-CCNC: 83 U/L (ref 55–135)
ALT SERPL W/O P-5'-P-CCNC: 17 U/L (ref 10–44)
ANION GAP SERPL CALC-SCNC: 6 MMOL/L (ref 8–16)
AST SERPL-CCNC: 35 U/L (ref 10–40)
BASOPHILS # BLD AUTO: 0.01 K/UL (ref 0–0.2)
BASOPHILS NFR BLD: 0.3 % (ref 0–1.9)
BILIRUB SERPL-MCNC: 2.8 MG/DL (ref 0.1–1)
BUN SERPL-MCNC: 7 MG/DL (ref 6–20)
CALCIUM SERPL-MCNC: 8.8 MG/DL (ref 8.7–10.5)
CHLORIDE SERPL-SCNC: 116 MMOL/L (ref 95–110)
CO2 SERPL-SCNC: 17 MMOL/L (ref 23–29)
CREAT SERPL-MCNC: 0.8 MG/DL (ref 0.5–1.4)
DIFFERENTIAL METHOD: ABNORMAL
EOSINOPHIL # BLD AUTO: 0.1 K/UL (ref 0–0.5)
EOSINOPHIL NFR BLD: 3.5 % (ref 0–8)
ERYTHROCYTE [DISTWIDTH] IN BLOOD BY AUTOMATED COUNT: 16.1 % (ref 11.5–14.5)
EST. GFR  (NO RACE VARIABLE): >60 ML/MIN/1.73 M^2
GLUCOSE SERPL-MCNC: 94 MG/DL (ref 70–110)
HCT VFR BLD AUTO: 31 % (ref 37–48.5)
HGB BLD-MCNC: 10.3 G/DL (ref 12–16)
IMM GRANULOCYTES # BLD AUTO: 0.01 K/UL (ref 0–0.04)
IMM GRANULOCYTES NFR BLD AUTO: 0.3 % (ref 0–0.5)
INR PPP: 1.6 (ref 0.8–1.2)
LYMPHOCYTES # BLD AUTO: 1.4 K/UL (ref 1–4.8)
LYMPHOCYTES NFR BLD: 43.7 % (ref 18–48)
MAGNESIUM SERPL-MCNC: 1.7 MG/DL (ref 1.6–2.6)
MCH RBC QN AUTO: 33.2 PG (ref 27–31)
MCHC RBC AUTO-ENTMCNC: 33.2 G/DL (ref 32–36)
MCV RBC AUTO: 100 FL (ref 82–98)
MONOCYTES # BLD AUTO: 0.3 K/UL (ref 0.3–1)
MONOCYTES NFR BLD: 9.3 % (ref 4–15)
NEUTROPHILS # BLD AUTO: 1.3 K/UL (ref 1.8–7.7)
NEUTROPHILS NFR BLD: 42.9 % (ref 38–73)
NRBC BLD-RTO: 0 /100 WBC
PHOSPHATE SERPL-MCNC: 3.2 MG/DL (ref 2.7–4.5)
PLATELET # BLD AUTO: 77 K/UL (ref 150–450)
PMV BLD AUTO: 10.5 FL (ref 9.2–12.9)
POTASSIUM SERPL-SCNC: 3.6 MMOL/L (ref 3.5–5.1)
PROT SERPL-MCNC: 5.1 G/DL (ref 6–8.4)
PROTHROMBIN TIME: 15.7 SEC (ref 9–12.5)
RBC # BLD AUTO: 3.1 M/UL (ref 4–5.4)
SODIUM SERPL-SCNC: 139 MMOL/L (ref 136–145)
WBC # BLD AUTO: 3.11 K/UL (ref 3.9–12.7)

## 2023-01-28 PROCEDURE — 85610 PROTHROMBIN TIME: CPT

## 2023-01-28 PROCEDURE — 25000003 PHARM REV CODE 250: Performed by: STUDENT IN AN ORGANIZED HEALTH CARE EDUCATION/TRAINING PROGRAM

## 2023-01-28 PROCEDURE — 25000003 PHARM REV CODE 250: Performed by: PSYCHIATRY & NEUROLOGY

## 2023-01-28 PROCEDURE — 84100 ASSAY OF PHOSPHORUS: CPT

## 2023-01-28 PROCEDURE — 25000003 PHARM REV CODE 250

## 2023-01-28 PROCEDURE — 85025 COMPLETE CBC W/AUTO DIFF WBC: CPT

## 2023-01-28 PROCEDURE — 83735 ASSAY OF MAGNESIUM: CPT

## 2023-01-28 PROCEDURE — 80053 COMPREHEN METABOLIC PANEL: CPT

## 2023-01-28 PROCEDURE — 36415 COLL VENOUS BLD VENIPUNCTURE: CPT

## 2023-01-28 RX ORDER — FUROSEMIDE 20 MG/1
10 TABLET ORAL 2 TIMES DAILY
Qty: 30 TABLET | Refills: 1 | Status: ON HOLD | OUTPATIENT
Start: 2023-01-28 | End: 2023-02-27 | Stop reason: SDUPTHER

## 2023-01-28 RX ADMIN — SPIRONOLACTONE 25 MG: 25 TABLET ORAL at 09:01

## 2023-01-28 RX ADMIN — LACTULOSE 30 G: 10 SOLUTION ORAL at 09:01

## 2023-01-28 RX ADMIN — LEVETIRACETAM 500 MG: 500 TABLET, FILM COATED ORAL at 09:01

## 2023-01-28 RX ADMIN — ARIPIPRAZOLE 5 MG: 5 TABLET ORAL at 09:01

## 2023-01-28 NOTE — DISCHARGE SUMMARY
[facility] Medicine  Discharge Summary      Patient Name: Marely Hamilton  MRN: [id number]  BUTCH: [id number]  Patient Class: IP- Inpatient  Admission Date: 1/25/2023  Hospital Length of Stay: 3 days  Discharge Date and Time:  01/28/2023 9:00 AM  Attending Physician: Edu Paz III, MD   Discharging Provider: Radha Bang MD  Primary Care Provider: Viktor Ross MD    Primary Care Team: Networked reference to record PCT     HPI:   58 yo F with PMH alcoholic cirrhosis, hepatic encephalopathy, seizure, bipolar disorder who presented to [facility] ED via EMS after call for AMS at patient's home. Per EMS report, patient was found with urinary incontinence and confused. Patient reports she has been feeling sick for a few days now. Denies any complaints. Unable to explain any symptoms or what medications she takes. She denies fever/chills, nausea or vomiting, abdominal pain. She denies any alcohol, drug or tobacco use. Due to patient unable to answer questions appropriately, called mother who patient lives with for collateral. She states patient is compliant with her medications and goes to  PCP. However, she states for the past few days she noticed she has been more sleepier than usual and could not communicate at her baseline. She also reports decreased appetite. She states she has been trying to keep patient out of the hospital but she often goes back in for a similar presentation. She states sister who lives in [place] was the one who called EMS for patient due to concerns.     In ED, HR 58, RR16, /60, stable on room air. Ammonia 139. Ethanol wnl. Tbili 2.4. WBC 2.40. PT/INR 15.1/1.5. CTH negative. CXR with no acute abnormality. UA and UDS in process. [facility] consulted for admission for hepatic encephalopathy vs seizure.       * No surgery found *      Hospital Course:   UDS negative. UA with no evidence of UTI. Abdominal US with no ascites. Mentation continued to improve with the  initiation of lactulose 30 mg TID with goal BM 2-3 daily achieved. Psych consulted for med recs. After discussion with patient's outpatient psychiatrist Dr. Coates, he recommended giving half dose of home meds while inpatient. Patient to resume full dose of her psych meds on discharge. PT/OT worked with patient due to chronic complaint of weakness. PT/OT recommended post-acute placement. However family and patient declined and preferred patient to go home with Home Health as they have a sitter already that is there 6 days a week. Repeat ammonia on 1/27 showed improvement to 47. Family spoke with patient on phone and agreed that she was at baseline and safe to discharge. Return precautions discussed. All questions answered. Patient verbalized understanding. Medications delivered bedside for patient.    Physical Exam  Vitals reviewed.   Constitutional:       General: She is awake. She is not in acute distress.     Appearance: She is not toxic-appearing.   Eyes:      Extraocular Movements: Extraocular movements intact.   Cardiovascular:      Rate and Rhythm: Normal rate and regular rhythm.      Pulses: Normal pulses.      Heart sounds: Normal heart sounds.   Pulmonary:      Effort: Pulmonary effort is normal. No respiratory distress.      Breath sounds: Normal breath sounds.   Abdominal:      General: Abdomen is flat.      Palpations: Abdomen is soft.      Tenderness: There is no abdominal tenderness. There is no guarding or rebound.   Neurological:      Mental Status: She is alert and oriented to person, place, and time.      Cranial Nerves: Cranial nerves 2-12 are intact. No cranial nerve deficit or facial asymmetry.      Sensory: No sensory deficit.      Motor: Weakness present.      Coordination: Finger-Nose-Finger Test abnormal.      Comments: Asterixis present bilaterally  Able to squeeze my fingers, but decreased  strength  Bradykinesia and dysdiadochokinesia  Cranial nerves grossly intact  Able to follow  simple commands   Psychiatric:         Behavior: Behavior normal. Behavior is cooperative.         Thought Content: Thought content normal.           Goals of Care Treatment Preferences:  Code Status: Full Code      Consults:   Consults (From admission, onward)        Status Ordering Provider     Inpatient consult to Psychiatry  Once        Provider:  Alfredito Aguayo MD    Completed DIPTI SILVA          No new Assessment & Plan notes have been filed under this hospital service since the last note was generated.  Service: Hospital Medicine    Final Active Diagnoses:    Diagnosis Date Noted POA    PRINCIPAL PROBLEM:  Hepatic encephalopathy [K76.82] 10/11/2015 Yes    Bipolar 1 disorder [F31.9] 06/09/2020 Yes    History of seizure [Z87.898] 06/15/2015 Yes    Thrombocytopenia [D69.6] 06/15/2015 Yes      Problems Resolved During this Admission:       Discharged Condition: stable    Disposition: Home or Self Care    Follow Up:   Follow-up Information     [facility] Follow up.    Specialties: Home Health Services, Home Therapy Services, Home Living Aide Services  Why: [facility]  Contact information:  [address]  [facility] [id number] [phone]           Paz Barrientos, NP Follow up.    Specialty: Hospitalist  Why: [facility] (NP hospital follow-up at home appointment requested).  Contact information:  [address] [place] [address] [phone]                       Patient Instructions:      Ambulatory referral/consult to [facility]   Standing Status: Future   Referral Priority: Routine Referral Type: Consultation   Referral Reason: Specialty Services Required   Number of Visits Requested: 1     Ambulatory referral/consult to Home Health   Standing Status: Future   Referral Priority: Routine Referral Type: Home Health   Referral Reason: Specialty Services Required   Requested Specialty: Home Health Services   Number of Visits  Requested: 1     Diet Adult Regular     Notify your health care provider if you experience any of the following:  persistent nausea and vomiting or diarrhea     Notify your health care provider if you experience any of the following:  persistent dizziness, light-headedness, or visual disturbances     Notify your health care provider if you experience any of the following:  increased confusion or weakness     Notify your health care provider if you experience any of the following:  temperature >100.4     Activity as tolerated       Significant Diagnostic Studies: Labs: All labs within the past 24 hours have been reviewed    Pending Diagnostic Studies:     Procedure Component Value Units Date/Time    Levetiracetam level [[id number]] Collected: 01/25/23 1122    Order Status: Sent Lab Status: In process Updated: 01/25/23 1939    Specimen: Blood          Medications:  Reconciled Home Medications:      Medication List      START taking these medications    lactulose 10 gram/15 mL solution  Commonly known as: CHRONULAC  Take 45 mLs (30 g total) by mouth 3 (three) times daily.        CONTINUE taking these medications    ARIPiprazole 10 MG Tab  Commonly known as: ABILIFY  Take 10 mg by mouth nightly.     hydrOXYzine 50 MG tablet  Commonly known as: ATARAX  Take 50 mg by mouth every evening.     levETIRAcetam 500 MG Tab  Commonly known as: KEPPRA  Take 1,000 mg by mouth 2 (two) times daily.     lithium 300 mg tablet  Commonly known as: LITHOTAB  Take 300 mg by mouth nightly.     mometasone 0.1 % ointment  Commonly known as: ELOCON  Apply topically.     multivitamin Tab  Take 1 tablet by mouth once daily.     pantoprazole 40 MG tablet  Commonly known as: PROTONIX  Take 40 mg by mouth once daily.     propranoloL 20 MG tablet  Commonly known as: INDERAL  Take 20 mg by mouth once daily.     QUEtiapine 100 MG Tab  Commonly known as: SEROQUEL  Take 100 mg by mouth every evening.     spironolactone 25 MG tablet  Commonly known as:  ALDACTONE  Take 25 mg by mouth once daily.        STOP taking these medications    metroNIDAZOLE 250 MG tablet  Commonly known as: FLAGYL     zonisamide 100 MG Cap  Commonly known as: ZONEGRAN            Indwelling Lines/Drains at time of discharge:   Lines/Drains/Airways     None                 Time spent on the discharge of patient: 30 minutes       Radha Bang MD  [facility], PGY-1  01/28/2023

## 2023-01-28 NOTE — CARE UPDATE
Medication list    START taking these medications    START taking these medications     Additional info Begin Date AM Noon PM Bedtime   Icon medications to start taking   lactulose 10 gram/15 mL solution  Commonly known as: CHRONULAC  Quantity: 3784 mL  Refills: 3  Dose: 30 g  Last time this was given: 30 g on January 28, 2023  9:03 AM Take 45 mLs (30 g total) by mouth 3 (three) times daily. Begin today with next dose due         CONTINUE taking these medications    CONTINUE taking these medications     Additional info Begin Date AM Noon PM Bedtime   Icon medications to continue taking   ARIPiprazole 10 MG Tab  Commonly known as: ABILIFY  Refills: 0  Dose: 10 mg  Last time this was given: 5 mg on January 28, 2023  9:03 AM Take 10 mg by mouth nightly. Begin tonight       Icon medications to continue taking   hydrOXYzine 50 MG tablet  Commonly known as: ATARAX  Refills: 0  Dose: 50 mg  Last time this was given: 50 mg on January 25, 2023  8:44 PM Take 50 mg by mouth every evening. Begin tonight       Icon medications to continue taking   levETIRAcetam 500 MG Tab  Commonly known as: KEPPRA  Refills: 0  Dose: 1,000 mg  Last time this was given: 500 mg on January 28, 2023  9:03 AM Take 1,000 mg by mouth 2 (two) times daily. Begin tonight       Icon medications to continue taking   lithium 300 mg tablet  Commonly known as: LITHOTAB  Refills: 0  Dose: 300 mg  Last time this was given: Ask your nurse or doctor Take 300 mg by mouth nightly. Begin tonight       Icon medications to continue taking   mometasone 0.1 % ointment  Commonly known as: ELOCON  Refills: 0 Apply topically. As directed       Icon medications to continue taking   multivitamin Tab  Refills: 0  Dose: 1 tablet Take 1 tablet by mouth once daily.        Icon medications to continue taking   pantoprazole 40 MG tablet  Commonly known as: PROTONIX  Refills: 0  Dose: 40 mg Take 40 mg by mouth once daily.        Icon medications to continue taking   propranoloL 20  MG tablet  Commonly known as: INDERAL  Refills: 0  Dose: 20 mg  Last time this was given: 20 mg on January 27, 2023  9:07 AM Take 20 mg by mouth once daily.        Icon medications to continue taking   QUEtiapine 100 MG Tab  Commonly known as: SEROQUEL  Refills: 0  Dose: 100 mg  Last time this was given: 50 mg on January 27, 2023  9:37 PM Take 100 mg by mouth every evening. Begin tonight       Icon medications to continue taking   spironolactone 25 MG tablet  Commonly known as: ALDACTONE  Refills: 0  Dose: 25 mg  Last time this was given: 25 mg on January 28, 2023  9:03 AM Take 25 mg by mouth once daily.          STOP taking these medications    STOP taking these medications   Icon medications to stop taking   metroNIDAZOLE 250 MG tablet  Commonly known as: FLAGYL   Icon medications to stop taking   zonisamide 100 MG Cap  Commonly known as: ZONEGRAN

## 2023-01-28 NOTE — NURSING
AVS reviewed with patient. Verbalized understanding of home medications. IV and Cardiac monitoring in place. Prescriptions at bedside. Voiced no concerns. W/c transport arrived to bring patient home.

## 2023-01-28 NOTE — PLAN OF CARE
"[facility] - Med Surg  Discharge Final Note    Primary Care Provider: Viktor Ross MD    Expected Discharge Date:     Pharmacist will go over home medications and reasons for medications. VN and bedside nurse to reiterate final discharge instructions.     Cleared from CM . Bedside Nurse and VN notified.    0928 am - Updated sister to DC for today. She will contact sitter to pickup pt for DC today. Bedside staff notified.   0940 am - Sitter unavailable. [facility] transport requested for ambulatory van for 1030 am .    Final Discharge Note (most recent)       Final Note - 01/28/23 0836          Final Note    Assessment Type Final Discharge Note     Anticipated Discharge Disposition Home-Health Care [facility]     Hospital Resources/Appts/Education Provided Appointments scheduled and added to AVS        Post-Acute Status    Post-Acute Authorization Home Health     Home Health Status Set-up Complete/Auth obtained   Orders to be sent to [facility]. [facility] accepted. Discussed with sister. Reports she did not want [facility] but is amendable to them. SOC for 1/30    Discharge Delays Personal Transportation   Orders needed for DC. PCA sitter to  at DC if prior to 1 pm.                  No future appointments.    BP (!) 96/54   Pulse 77   Temp 98.1 °F (36.7 °C)   Resp 20   Ht 5' 3" (1.6 m)   Wt 58.9 kg (129 lb 13.6 oz)   SpO2 (!) 93%   BMI 23.00 kg/m²       Contact Info       [facility]   Specialty: Home Health Services, Home Therapy Services, Home Living Aide Services    [address]  [facility] [address]   Phone: [phone]       Next Steps: Follow up    Instructions: Spoqa    Paz Barrientos, NP   Specialty: Hospitalist    [address] [place] [address]   Phone: [phone]       Next Steps: Follow up    Instructions: [facility] (NP hospital follow-up at home appointment requested).             Medication List        START taking these medications      lactulose " 10 gram/15 mL solution  Commonly known as: CHRONULAC  Take 45 mLs (30 g total) by mouth 3 (three) times daily.            CONTINUE taking these medications      ARIPiprazole 10 MG Tab  Commonly known as: ABILIFY     hydrOXYzine 50 MG tablet  Commonly known as: ATARAX     levETIRAcetam 500 MG Tab  Commonly known as: KEPPRA     lithium 300 mg tablet  Commonly known as: LITHOTAB     mometasone 0.1 % ointment  Commonly known as: ELOCON     multivitamin Tab  Take 1 tablet by mouth once daily.     pantoprazole 40 MG tablet  Commonly known as: PROTONIX     propranoloL 20 MG tablet  Commonly known as: INDERAL     QUEtiapine 100 MG Tab  Commonly known as: SEROQUEL     spironolactone 25 MG tablet  Commonly known as: ALDACTONE            STOP taking these medications      metroNIDAZOLE 250 MG tablet  Commonly known as: FLAGYL     zonisamide 100 MG Cap  Commonly known as: ZONEGRAN               Where to Get Your Medications        These medications were sent to [facility] [address] [address] [place] [address]      Hours: Mon-Fri, 8a-5:30p Phone: [phone]   lactulose 10 gram/15 mL solution

## 2023-01-28 NOTE — PT/OT/SLP PROGRESS
Physical Therapy      Patient Name:  Marely Hamilton   MRN:  [id number]    Patient not seen today secondary to Other (Comment) (Pt was discharged prior to PT staff arriving to her room.).

## 2023-01-28 NOTE — PLAN OF CARE
Problem: Adult Inpatient Plan of Care  Goal: Plan of Care Review  2023 1108 by Ronit Le RN  Outcome: Met  2023 1046 by Ronit Le RN  Outcome: Ongoing, Progressing  Goal: Patient-Specific Goal (Individualized)  Outcome: Met  Goal: Absence of Hospital-Acquired Illness or Injury  Outcome: Met  Goal: Optimal Comfort and Wellbeing  Outcome: Met  Goal: Readiness for Transition of Care  Outcome: Met     Problem: Skin Injury Risk Increased  Goal: Skin Health and Integrity  Outcome: Met     Problem: Fall Injury Risk  Goal: Absence of Fall and Fall-Related Injury  Outcome: Met     Problem: Behavioral Health Comorbidity  Goal: Maintenance of Behavioral Health Symptom Control  Outcome: Met     Problem: Seizure Disorder Comorbidity  Goal: Maintenance of Seizure Control  Outcome: Met     Problem: Confusion Acute  Goal: Optimal Cognitive Function  Outcome: Met     Problem: Occupational Therapy  Goal: Occupational Therapy Goal  Description: Goals to be met by: 2023     Patient will increase functional independence with ADLs by performing:    Feeding with Set-up Assistance in upright sitting.  UE Dressing with Set-up Assistance.  LE Dressing with Contact Guard Assistance.  Toileting from bedside commode with Contact Guard Assistance for hygiene and clothing management.   Sitting at edge of bed x8 minutes with Stand-by Assistance for balance  Toilet transfer to bedside commode with Standby Assistance.  Functional mobility from bed to the bathroom with CGA and use of least restrictive device  Increased functional strength to 4-/5 grossly for improving strength to participate in daily regimen.    Outcome: Met     Problem: Physical Therapy  Goal: Physical Therapy Goal  Description: Goals to be met by: 23     Patient will increase functional independence with mobility by performin. Supine to sit with Stand-by Assistance  2. Sit to supine with Stand-by Assistance  3. Sit to stand transfer with Stand-by  Assistance  4. Bed to chair transfer with Stand-by Assistance using LRAD  5. Gait  x 100 feet with Stand-by Assistance using LRAD.   6. Ascend/descend 1 step with either HHA / CGA or use of assistive device.     Outcome: Met     Problem: Bleeding Risk or Actual (Thrombocytopenia)  Goal: Absence of Bleeding  Outcome: Met

## 2023-01-28 NOTE — PLAN OF CARE
VN NOTE  CM informed VN to call patient's sister with discharge instructions. Called patient's sister, Zaria, who had a copy of the discharge papers. Reviewed all discharge instructions. Zaria verbalized understanding. Teach back method used. All questions answered.

## 2023-01-28 NOTE — CARE UPDATE
[place] med list emailed to sister Vernon at [email or web address]    Medication list    START taking these medications    START taking these medications     Additional info Begin Date AM Noon PM Bedtime   Icon medications to start taking   furosemide 20 MG tablet  Commonly known as: LASIX  Quantity: 30 tablet  Refills: 1  Dose: 10 mg Take 0.5 tablets (10 mg total) by mouth 2 (two) times daily. Begin today before dinner       Icon medications to start taking   lactulose 10 gram/15 mL solution  Commonly known as: CHRONULAC  Quantity: 3784 mL  Refills: 3  Dose: 30 g  Last time this was given: 30 g on January 28, 2023  9:03 AM Take 45 mLs (30 g total) by mouth 3 (three) times daily. Begin today with next dose due         CONTINUE taking these medications    CONTINUE taking these medications     Additional info Begin Date AM Noon PM Bedtime   Icon medications to continue taking   ARIPiprazole 10 MG Tab  Commonly known as: ABILIFY  Refills: 0  Dose: 10 mg  Last time this was given: 5 mg on January 28, 2023  9:03 AM Take 10 mg by mouth nightly. Begin tonight       Icon medications to continue taking   hydrOXYzine 50 MG tablet  Commonly known as: ATARAX  Refills: 0  Dose: 50 mg  Last time this was given: 50 mg on January 25, 2023  8:44 PM Take 50 mg by mouth every evening. Begin tonight       Icon medications to continue taking   levETIRAcetam 500 MG Tab  Commonly known as: KEPPRA  Refills: 0  Dose: 1,000 mg  Last time this was given: 500 mg on January 28, 2023  9:03 AM Take 1,000 mg by mouth 2 (two) times daily. Begin tonight       Icon medications to continue taking   lithium 300 mg tablet  Commonly known as: LITHOTAB  Refills: 0  Dose: 300 mg  Last time this was given: Ask your nurse or doctor Take 300 mg by mouth nightly. Begin tonight       Icon medications to continue taking   mometasone 0.1 % ointment  Commonly known as: ELOCON  Refills: 0 Apply topically. As directed       Icon medications to continue taking   multivitamin  Tab  Refills: 0  Dose: 1 tablet Take 1 tablet by mouth once daily.        Icon medications to continue taking   pantoprazole 40 MG tablet  Commonly known as: PROTONIX  Refills: 0  Dose: 40 mg Take 40 mg by mouth once daily.        Icon medications to continue taking   propranoloL 20 MG tablet  Commonly known as: INDERAL  Refills: 0  Dose: 20 mg  Last time this was given: 20 mg on January 27, 2023  9:07 AM Take 20 mg by mouth once daily.        Icon medications to continue taking   QUEtiapine 100 MG Tab  Commonly known as: SEROQUEL  Refills: 0  Dose: 100 mg  Last time this was given: 50 mg on January 27, 2023  9:37 PM Take 100 mg by mouth every evening. Begin tonight       Icon medications to continue taking   spironolactone 25 MG tablet  Commonly known as: ALDACTONE  Refills: 0  Dose: 25 mg  Last time this was given: 25 mg on January 28, 2023  9:03 AM Take 25 mg by mouth once daily.          STOP taking these medications    STOP taking these medications   Icon medications to stop taking   metroNIDAZOLE 250 MG tablet  Commonly known as: FLAGYL   Icon medications to stop taking   zonisamide 100 MG Cap  Commonly known as: ZONEGRAN            Where to  your medications    Icon medication  information                these medications at [facility]  [address] [place] [address]    furosemide  [address] [address] [address]   Phone: [phone]     Icon medication  information                these medications at [facility]    lactulose  Address: [address] [place] [address] [address]   Hours: Mon-Fri, 8a-5:30p   Phone: [phone]

## 2023-01-28 NOTE — PLAN OF CARE
Problem: Adult Inpatient Plan of Care  Goal: Plan of Care Review  Outcome: Ongoing, Not Progressing   Updated care plan.  Chart reviewed.

## 2023-01-30 ENCOUNTER — PATIENT OUTREACH (OUTPATIENT)
Dept: ADMINISTRATIVE | Facility: CLINIC | Age: 58
End: 2023-01-30
Payer: MEDICARE

## 2023-01-30 ENCOUNTER — NURSE TRIAGE (OUTPATIENT)
Dept: ADMINISTRATIVE | Facility: CLINIC | Age: 58
End: 2023-01-30
Payer: MEDICARE

## 2023-01-30 DIAGNOSIS — K76.82 HEPATIC ENCEPHALOPATHY: Primary | ICD-10-CM

## 2023-01-30 LAB — LEVETIRACETAM SERPL-MCNC: 44.9 UG/ML (ref 3–60)
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
## 2023-01-30 NOTE — PROGRESS NOTES
"C3 nurse spoke with Marely Hamilton for a TCC post hospital discharge follow up call. The patient reports does not have a scheduled HOSFU appointment. C3 nurse was unable to schedule HOSFU appointment for [facility]. Patient advised to contact their PCP to schedule a HOSPFU within 5-7 days. Referral to home NP placed.   Pt complained of "Dizziness, nausea, vomitting" Last vomited this morning. [facility] nurse will reach out to pt.               "

## 2023-01-31 ENCOUNTER — PES CALL (OUTPATIENT)
Dept: ADMINISTRATIVE | Facility: CLINIC | Age: 58
End: 2023-01-31
Payer: MEDICARE

## 2023-02-07 ENCOUNTER — HOSPITAL ENCOUNTER (INPATIENT)
Facility: HOSPITAL | Age: 58
LOS: 7 days | Discharge: NURSING, REHAB OR LONG-TERM CARE | DRG: 442 | End: 2023-02-14
Attending: EMERGENCY MEDICINE | Admitting: EMERGENCY MEDICINE
Payer: MEDICARE

## 2023-02-07 DIAGNOSIS — R41.82 AMS (ALTERED MENTAL STATUS): ICD-10-CM

## 2023-02-07 DIAGNOSIS — R00.0 TACHYCARDIA: ICD-10-CM

## 2023-02-07 DIAGNOSIS — K76.82 HEPATIC ENCEPHALOPATHY: Primary | ICD-10-CM

## 2023-02-07 DIAGNOSIS — I47.10 SVT (SUPRAVENTRICULAR TACHYCARDIA): ICD-10-CM

## 2023-02-07 LAB
ALBUMIN SERPL BCP-MCNC: 3.1 G/DL (ref 3.5–5.2)
ALP SERPL-CCNC: 91 U/L (ref 55–135)
ALT SERPL W/O P-5'-P-CCNC: 16 U/L (ref 10–44)
AMMONIA PLAS-SCNC: 131 UMOL/L (ref 10–50)
ANION GAP SERPL CALC-SCNC: 9 MMOL/L (ref 8–16)
APTT BLDCRRT: 32.3 SEC (ref 21–32)
AST SERPL-CCNC: 34 U/L (ref 10–40)
BASOPHILS # BLD AUTO: 0.01 K/UL (ref 0–0.2)
BASOPHILS NFR BLD: 0.3 % (ref 0–1.9)
BILIRUB SERPL-MCNC: 3.1 MG/DL (ref 0.1–1)
BUN SERPL-MCNC: 16 MG/DL (ref 6–20)
CALCIUM SERPL-MCNC: 9.7 MG/DL (ref 8.7–10.5)
CHLORIDE SERPL-SCNC: 117 MMOL/L (ref 95–110)
CO2 SERPL-SCNC: 14 MMOL/L (ref 23–29)
CREAT SERPL-MCNC: 0.9 MG/DL (ref 0.5–1.4)
DIFFERENTIAL METHOD: ABNORMAL
EOSINOPHIL # BLD AUTO: 0.1 K/UL (ref 0–0.5)
EOSINOPHIL NFR BLD: 3.4 % (ref 0–8)
ERYTHROCYTE [DISTWIDTH] IN BLOOD BY AUTOMATED COUNT: 16.9 % (ref 11.5–14.5)
EST. GFR  (NO RACE VARIABLE): >60 ML/MIN/1.73 M^2
GLUCOSE SERPL-MCNC: 211 MG/DL (ref 70–110)
HCT VFR BLD AUTO: 40.1 % (ref 37–48.5)
HCV AB SERPL QL IA: NORMAL
HGB BLD-MCNC: 12.5 G/DL (ref 12–16)
HIV 1+2 AB+HIV1 P24 AG SERPL QL IA: NORMAL
IMM GRANULOCYTES # BLD AUTO: 0.01 K/UL (ref 0–0.04)
IMM GRANULOCYTES NFR BLD AUTO: 0.3 % (ref 0–0.5)
INR PPP: 1.6 (ref 0.8–1.2)
LITHIUM SERPL-SCNC: 0.5 MMOL/L (ref 0.6–1.2)
LYMPHOCYTES # BLD AUTO: 1.1 K/UL (ref 1–4.8)
LYMPHOCYTES NFR BLD: 33.4 % (ref 18–48)
MCH RBC QN AUTO: 32.3 PG (ref 27–31)
MCHC RBC AUTO-ENTMCNC: 31.2 G/DL (ref 32–36)
MCV RBC AUTO: 104 FL (ref 82–98)
MONOCYTES # BLD AUTO: 0.2 K/UL (ref 0.3–1)
MONOCYTES NFR BLD: 7.1 % (ref 4–15)
NEUTROPHILS # BLD AUTO: 1.8 K/UL (ref 1.8–7.7)
NEUTROPHILS NFR BLD: 55.5 % (ref 38–73)
NRBC BLD-RTO: 0 /100 WBC
PLATELET # BLD AUTO: 59 K/UL (ref 150–450)
PMV BLD AUTO: 11.4 FL (ref 9.2–12.9)
POTASSIUM SERPL-SCNC: 4.3 MMOL/L (ref 3.5–5.1)
PROT SERPL-MCNC: 6.2 G/DL (ref 6–8.4)
PROTHROMBIN TIME: 16 SEC (ref 9–12.5)
RBC # BLD AUTO: 3.87 M/UL (ref 4–5.4)
SODIUM SERPL-SCNC: 140 MMOL/L (ref 136–145)
T4 FREE SERPL-MCNC: 0.81 NG/DL (ref 0.71–1.51)
TSH SERPL DL<=0.005 MIU/L-ACNC: 0.36 UIU/ML (ref 0.4–4)
WBC # BLD AUTO: 3.26 K/UL (ref 3.9–12.7)

## 2023-02-07 PROCEDURE — 80178 ASSAY OF LITHIUM: CPT | Performed by: EMERGENCY MEDICINE

## 2023-02-07 PROCEDURE — 25000003 PHARM REV CODE 250: Performed by: STUDENT IN AN ORGANIZED HEALTH CARE EDUCATION/TRAINING PROGRAM

## 2023-02-07 PROCEDURE — 87389 HIV-1 AG W/HIV-1&-2 AB AG IA: CPT | Performed by: PHYSICIAN ASSISTANT

## 2023-02-07 PROCEDURE — 80053 COMPREHEN METABOLIC PANEL: CPT | Performed by: EMERGENCY MEDICINE

## 2023-02-07 PROCEDURE — 85730 THROMBOPLASTIN TIME PARTIAL: CPT | Performed by: EMERGENCY MEDICINE

## 2023-02-07 PROCEDURE — 63600175 PHARM REV CODE 636 W HCPCS: Performed by: EMERGENCY MEDICINE

## 2023-02-07 PROCEDURE — 99285 EMERGENCY DEPT VISIT HI MDM: CPT | Mod: 25

## 2023-02-07 PROCEDURE — 99223 1ST HOSP IP/OBS HIGH 75: CPT | Mod: ,,, | Performed by: STUDENT IN AN ORGANIZED HEALTH CARE EDUCATION/TRAINING PROGRAM

## 2023-02-07 PROCEDURE — 99285 PR EMERGENCY DEPT VISIT,LEVEL V: ICD-10-PCS | Mod: ,,, | Performed by: EMERGENCY MEDICINE

## 2023-02-07 PROCEDURE — 93010 ELECTROCARDIOGRAM REPORT: CPT | Mod: ,,, | Performed by: INTERNAL MEDICINE

## 2023-02-07 PROCEDURE — 99223 PR INITIAL HOSPITAL CARE,LEVL III: ICD-10-PCS | Mod: ,,, | Performed by: STUDENT IN AN ORGANIZED HEALTH CARE EDUCATION/TRAINING PROGRAM

## 2023-02-07 PROCEDURE — 93005 ELECTROCARDIOGRAM TRACING: CPT

## 2023-02-07 PROCEDURE — 85610 PROTHROMBIN TIME: CPT | Performed by: EMERGENCY MEDICINE

## 2023-02-07 PROCEDURE — 25000003 PHARM REV CODE 250: Performed by: EMERGENCY MEDICINE

## 2023-02-07 PROCEDURE — 84439 ASSAY OF FREE THYROXINE: CPT | Performed by: EMERGENCY MEDICINE

## 2023-02-07 PROCEDURE — 86803 HEPATITIS C AB TEST: CPT | Performed by: PHYSICIAN ASSISTANT

## 2023-02-07 PROCEDURE — 99285 EMERGENCY DEPT VISIT HI MDM: CPT | Mod: ,,, | Performed by: EMERGENCY MEDICINE

## 2023-02-07 PROCEDURE — 84443 ASSAY THYROID STIM HORMONE: CPT | Performed by: EMERGENCY MEDICINE

## 2023-02-07 PROCEDURE — 85025 COMPLETE CBC W/AUTO DIFF WBC: CPT | Performed by: EMERGENCY MEDICINE

## 2023-02-07 PROCEDURE — 93010 EKG 12-LEAD: ICD-10-PCS | Mod: ,,, | Performed by: INTERNAL MEDICINE

## 2023-02-07 PROCEDURE — 12000002 HC ACUTE/MED SURGE SEMI-PRIVATE ROOM

## 2023-02-07 PROCEDURE — 82140 ASSAY OF AMMONIA: CPT | Performed by: EMERGENCY MEDICINE

## 2023-02-07 RX ORDER — LACTULOSE 10 G/15ML
30 SOLUTION ORAL 4 TIMES DAILY
Status: DISCONTINUED | OUTPATIENT
Start: 2023-02-07 | End: 2023-02-08

## 2023-02-07 RX ORDER — POLYETHYLENE GLYCOL 3350 17 G/17G
17 POWDER, FOR SOLUTION ORAL DAILY PRN
Status: DISCONTINUED | OUTPATIENT
Start: 2023-02-07 | End: 2023-02-14 | Stop reason: HOSPADM

## 2023-02-07 RX ORDER — SODIUM CHLORIDE 0.9 % (FLUSH) 0.9 %
10 SYRINGE (ML) INJECTION EVERY 12 HOURS PRN
Status: DISCONTINUED | OUTPATIENT
Start: 2023-02-07 | End: 2023-02-14 | Stop reason: HOSPADM

## 2023-02-07 RX ORDER — TALC
6 POWDER (GRAM) TOPICAL NIGHTLY PRN
Status: DISCONTINUED | OUTPATIENT
Start: 2023-02-07 | End: 2023-02-14 | Stop reason: HOSPADM

## 2023-02-07 RX ORDER — ONDANSETRON 8 MG/1
8 TABLET, ORALLY DISINTEGRATING ORAL EVERY 8 HOURS PRN
Status: DISCONTINUED | OUTPATIENT
Start: 2023-02-07 | End: 2023-02-14 | Stop reason: HOSPADM

## 2023-02-07 RX ORDER — FOLIC ACID 1 MG/1
1 TABLET ORAL DAILY
Status: DISCONTINUED | OUTPATIENT
Start: 2023-02-08 | End: 2023-02-14 | Stop reason: HOSPADM

## 2023-02-07 RX ORDER — PROCHLORPERAZINE EDISYLATE 5 MG/ML
5 INJECTION INTRAMUSCULAR; INTRAVENOUS EVERY 6 HOURS PRN
Status: DISCONTINUED | OUTPATIENT
Start: 2023-02-07 | End: 2023-02-14 | Stop reason: HOSPADM

## 2023-02-07 RX ORDER — PANTOPRAZOLE SODIUM 40 MG/1
40 TABLET, DELAYED RELEASE ORAL DAILY
Status: DISCONTINUED | OUTPATIENT
Start: 2023-02-08 | End: 2023-02-14 | Stop reason: HOSPADM

## 2023-02-07 RX ORDER — THIAMINE HCL 100 MG
100 TABLET ORAL DAILY
Status: DISCONTINUED | OUTPATIENT
Start: 2023-02-08 | End: 2023-02-14 | Stop reason: HOSPADM

## 2023-02-07 RX ORDER — LITHIUM CARBONATE 300 MG/1
300 TABLET, FILM COATED, EXTENDED RELEASE ORAL NIGHTLY
Status: DISCONTINUED | OUTPATIENT
Start: 2023-02-07 | End: 2023-02-14 | Stop reason: HOSPADM

## 2023-02-07 RX ORDER — SPIRONOLACTONE 25 MG/1
25 TABLET ORAL DAILY
Status: DISCONTINUED | OUTPATIENT
Start: 2023-02-08 | End: 2023-02-10

## 2023-02-07 RX ORDER — ARIPIPRAZOLE 5 MG/1
10 TABLET ORAL NIGHTLY
Status: DISCONTINUED | OUTPATIENT
Start: 2023-02-07 | End: 2023-02-14 | Stop reason: HOSPADM

## 2023-02-07 RX ORDER — HYDROXYZINE HYDROCHLORIDE 25 MG/1
50 TABLET, FILM COATED ORAL NIGHTLY
Status: DISCONTINUED | OUTPATIENT
Start: 2023-02-07 | End: 2023-02-14 | Stop reason: HOSPADM

## 2023-02-07 RX ORDER — LEVETIRACETAM 500 MG/1
1000 TABLET ORAL 2 TIMES DAILY
Status: DISCONTINUED | OUTPATIENT
Start: 2023-02-07 | End: 2023-02-14 | Stop reason: HOSPADM

## 2023-02-07 RX ORDER — LACTULOSE 10 G/15ML
10 SOLUTION ORAL
Status: COMPLETED | OUTPATIENT
Start: 2023-02-07 | End: 2023-02-07

## 2023-02-07 RX ORDER — ACETAMINOPHEN 325 MG/1
650 TABLET ORAL EVERY 8 HOURS PRN
Status: DISCONTINUED | OUTPATIENT
Start: 2023-02-07 | End: 2023-02-14 | Stop reason: HOSPADM

## 2023-02-07 RX ORDER — NALOXONE HCL 0.4 MG/ML
0.02 VIAL (ML) INJECTION
Status: DISCONTINUED | OUTPATIENT
Start: 2023-02-07 | End: 2023-02-14 | Stop reason: HOSPADM

## 2023-02-07 RX ORDER — PROPRANOLOL HYDROCHLORIDE 10 MG/1
20 TABLET ORAL DAILY
Status: DISCONTINUED | OUTPATIENT
Start: 2023-02-08 | End: 2023-02-07

## 2023-02-07 RX ADMIN — LACTULOSE 10 G: 20 SOLUTION ORAL at 03:02

## 2023-02-07 RX ADMIN — LITHIUM CARBONATE 300 MG: 300 TABLET, FILM COATED, EXTENDED RELEASE ORAL at 10:02

## 2023-02-07 RX ADMIN — ARIPIPRAZOLE 10 MG: 5 TABLET ORAL at 10:02

## 2023-02-07 RX ADMIN — LACTULOSE 30 G: 20 SOLUTION ORAL at 10:02

## 2023-02-07 RX ADMIN — SODIUM CHLORIDE, POTASSIUM CHLORIDE, SODIUM LACTATE AND CALCIUM CHLORIDE 1000 ML: 600; 310; 30; 20 INJECTION, SOLUTION INTRAVENOUS at 03:02

## 2023-02-07 RX ADMIN — LACTULOSE 30 G: 20 SOLUTION ORAL at 05:02

## 2023-02-07 RX ADMIN — LEVETIRACETAM 1000 MG: 500 TABLET, FILM COATED ORAL at 10:02

## 2023-02-07 RX ADMIN — HYDROXYZINE HYDROCHLORIDE 50 MG: 25 TABLET, FILM COATED ORAL at 10:02

## 2023-02-07 NOTE — ASSESSMENT & PLAN NOTE
MELD-Na score: 16 at 2/7/2023  2:24 PM  MELD score: 16 at 2/7/2023  2:24 PM  Calculated from:  Serum Creatinine: 0.9 mg/dL (Using min of 1 mg/dL) at 2/7/2023  1:56 PM  Serum Sodium: 140 mmol/L (Using max of 137 mmol/L) at 2/7/2023  1:56 PM  Total Bilirubin: 3.1 mg/dL at 2/7/2023  1:56 PM  INR(ratio): 1.6 at 2/7/2023  2:24 PM  Age: 57 years     - Lactulose 4 times daily  - Holding home Lasix and spironolactone given concern for dehydration  - Holding home propranolol given bradycardia  - Low-sodium diet, fluid restriction 1500 mL  - Nutrition consult  - Hepatology consult

## 2023-02-07 NOTE — ED TRIAGE NOTES
EMS states pt home health sitter stated she is altered from baseline and is refusing to take medications

## 2023-02-07 NOTE — ED NOTES
Patient identifiers verified and correct for Marely Hamilton  C/C: EMS states pt home health sitter stated she is altered from baseline and is refusing to take medications. Pt denies any complaints at this time  APPEARANCE: awake and alert in no acute distress.  SKIN: warm, dry and intact. No breakdown or bruising.  MUSCULOSKELETAL: Patient moving all extremities spontaneously, no obvious swelling or deformities noted.   RESPIRATORY: Denies shortness of breath. Respirations unlabored.   CARDIAC: Denies CP, 2+ distal pulses; no peripheral edema  ABDOMEN: soft, non-tender, and non-distended, Denies N/V  : voids spontaneously, denies difficulty  Neurologic: AAO x 2, only oriented to person and place; follows commands equal strength in all extremities; denies numbness/tingling.

## 2023-02-07 NOTE — ED PROVIDER NOTES
Encounter Date: 2/7/2023       History     Chief Complaint   Patient presents with    Altered Mental Status     Hasnt taken lactulose in week. Hx of cirrhosis. Also hasn't been eating or drinking.     HPI  58 yo F with PMH alcoholic cirrhosis, hepatic encephalopathy, seizure, bipolar disorder who [place] [facility] after call for AMS.  Patient is alert and oriented to self only, confused about time or her current situation, appear to be semi somnolent.  Per EMS report, they was called by her sitter for altered mental status, she was confused and unable to follow command at that time.  I decided to call her sister for collateral information, she states that patient has history of bipolar and alcoholic liver cirrhosis.  States that patient has depression.  That she would not take care of herself, stopped taking medication which makes her condition getting worse quickly.  They believe that patient has not taking lactulose for 1 week now.  Denies head injury, alcohol consumption.  Patient is normally take care of the medication by herself, she lives with her mother also had chronic illness, they have a caregiver that came to check on them every day.  From chart review, patient was recently discharged from the hospital on January 28 for similar presentation.  Review of patient's allergies indicates:   Allergen Reactions    Sulfa (sulfonamide antibiotics) Rash    Codeine Nausea And Vomiting     Past Medical History:   Diagnosis Date    Addiction to drug     Alcohol abuse     Alcohol abuse, in remission 6/15/2015    14.5 weeks ago; AA weekly    Anemia     Anxiety 6/15/2015    Behavioral problem     Bipolar disorder     Bipolar disorder in remission 6/15/2015    Cirrhosis, Laennec's 6/15/2015    Depression     Encounter for blood transfusion     Epistaxis 6/15/2015    Fatigue     Glaucoma     Hematuria     Hepatic encephalopathy 6/15/2015    Hepatic enlargement     History of psychiatric care     History of  psychiatric hospitalization     History of seizure 6/15/2015    1    hx of intentional Tylenol overdose 6/15/2015    2005- situational and hx of bipolar    Hx of psychiatric care     Jeana     Osteoarthritis     Other ascites 6/15/2015    Psychiatric exam requested by authority     Psychiatric problem     Psychosis 9/26/2019    Renal disorder     Seizures     Self-harming behavior     Suicide attempt     Therapy     Thrombocytopenia 6/15/2015     Past Surgical History:   Procedure Laterality Date    COSMETIC SURGERY      ESOPHAGOGASTRODUODENOSCOPY       Family History   Problem Relation Age of Onset    Alcohol abuse Father     Suicide Father     Bipolar disorder Father     Alcohol abuse Sister     Hypertension Mother     Alcohol abuse Paternal Grandfather     Drug abuse Neg Hx     Dementia Neg Hx      Social History     Tobacco Use    Smoking status: Never    Smokeless tobacco: Never   Substance Use Topics    Alcohol use: Not Currently     Comment: hx of ETOH abuse with cirrhosis of liver    Drug use: No     Review of Systems   Unable to perform ROS: Mental status change     Physical Exam     Initial Vitals [02/07/23 1335]   BP Pulse Resp Temp SpO2   107/68 (!) 57 (!) 22 98.8 °F (37.1 °C) 99 %      MAP       --         Physical Exam    Nursing note and vitals reviewed.  Constitutional: She appears well-developed. No distress.   HENT:   Head: Normocephalic and atraumatic.   Mouth/Throat: Oropharynx is clear and moist.   Eyes: Conjunctivae and EOM are normal.   Neck: No JVD present.   Normal range of motion.  Cardiovascular:  Normal rate, regular rhythm, normal heart sounds and intact distal pulses.           Pulmonary/Chest: Breath sounds normal. No respiratory distress.   Abdominal: Abdomen is soft. She exhibits no distension. There is no abdominal tenderness.   Musculoskeletal:         General: No tenderness or edema. Normal range of motion.      Cervical back: Normal range of motion.     Neurological: She is  alert. She has normal strength. No cranial nerve deficit.   Skin: Skin is warm and dry. Capillary refill takes less than 2 seconds.       ED Course   Procedures  Labs Reviewed   CBC W/ AUTO DIFFERENTIAL - Abnormal; Notable for the following components:       Result Value    WBC 3.26 (*)     RBC 3.87 (*)      (*)     MCH 32.3 (*)     MCHC 31.2 (*)     RDW 16.9 (*)     Platelets 59 (*)     Mono # 0.2 (*)     All other components within normal limits    Narrative:     Release to patient->Immediate   COMPREHENSIVE METABOLIC PANEL - Abnormal; Notable for the following components:    Chloride 117 (*)     CO2 14 (*)     Glucose 211 (*)     Albumin 3.1 (*)     Total Bilirubin 3.1 (*)     All other components within normal limits    Narrative:     Release to patient->Immediate   APTT - Abnormal; Notable for the following components:    aPTT 32.3 (*)     All other components within normal limits    Narrative:     Release to patient->Immediate   PROTIME-INR - Abnormal; Notable for the following components:    Prothrombin Time 16.0 (*)     INR 1.6 (*)     All other components within normal limits    Narrative:     Release to patient->Immediate   TSH - Abnormal; Notable for the following components:    TSH 0.361 (*)     All other components within normal limits    Narrative:     Release to patient->Immediate   AMMONIA - Abnormal; Notable for the following components:    Ammonia 131 (*)     All other components within normal limits    Narrative:     Release to patient->Immediate   LITHIUM LEVEL - Abnormal; Notable for the following components:    Lithium Level 0.5 (*)     All other components within normal limits   HIV 1 / 2 ANTIBODY    Narrative:     Release to patient->Immediate   HEPATITIS C ANTIBODY    Narrative:     Release to patient->Immediate   T4, FREE    Narrative:     Release to patient->Immediate     EKG Readings: (Independently Interpreted)   Initial Reading: No STEMI. Rhythm: Sinus Bradycardia. Heart Rate:  56. Ectopy: No Ectopy. Conduction: Normal. ST Segments: Normal ST Segments. T Waves: Normal. Axis: Normal. Clinical Impression: Sinus Bradycardia   ECG Results              EKG 12-lead (Final result)  Result time 02/07/23 15:57:30      Final result by Interface, Lab In [facility] (02/07/23 15:57:30)                   Narrative:    Test Reason : R41.82,    Vent. Rate : 056 BPM     Atrial Rate : 056 BPM     P-R Int : 152 ms          QRS Dur : 082 ms      QT Int : 438 ms       P-R-T Axes : 077 036 087 degrees     QTc Int : 422 ms    Artifact  Sinus bradycardia  Nonspecific T wave abnormality  Abnormal ECG  When compared with ECG of 25-JAN-2023 11:13,  Poor data quality in current ECG precludes serial comparison    Confirmed by Ayush Hay MD ([id number]) on 2/7/2023 3:57:21 PM    Referred By: [email or web address]   SELF           Confirmed By:Ayush Hay MD                                  Imaging Results              CT Head Without Contrast (Final result)  Result time 02/07/23 15:58:19      Final result by Ayush Umana MD (02/07/23 15:58:19)                   Impression:      Stable exam.  No evidence of acute hemorrhage or major vascular distribution infarct.    Electronically signed by resident: Emiliano Mcmahon  Date:    02/07/2023  Time:    15:38    Electronically signed by: Ayush Umana MD  Date:    02/07/2023  Time:    15:58               Narrative:    EXAMINATION:  CT HEAD WITHOUT CONTRAST    CLINICAL HISTORY:  Mental status change, unknown cause;    TECHNIQUE:  Low dose axial CT images obtained throughout the head without the use of intravenous contrast.  Axial, sagittal and coronal reconstructions were performed.    COMPARISON:  CT head 01/25/2023    FINDINGS:  The brain parenchyma appears unchanged.  No new hemorrhage or edema.  No new mass effect or midline shift.  No new major vascular distribution infarct.    Ventricles are unchanged in size. No hydrocephalus.    No new extra-axial blood or fluid collections are  identified.    No displaced calvarial fracture.    Visualized mastoid air cells and paranasal sinuses are essentially clear.                                       Medications   sodium chloride 0.9% flush 10 mL (has no administration in time range)   melatonin tablet 6 mg (has no administration in time range)   ondansetron disintegrating tablet 8 mg (has no administration in time range)   prochlorperazine injection Soln 5 mg (has no administration in time range)   polyethylene glycol packet 17 g (has no administration in time range)   acetaminophen tablet 650 mg (has no administration in time range)   naloxone 0.4 mg/mL injection 0.02 mg (has no administration in time range)   levETIRAcetam tablet 1,000 mg (1,000 mg Oral Given 2/8/23 0911)   lithium CR tablet 300 mg (300 mg Oral Given 2/7/23 2222)   lactulose 20 gram/30 mL solution Soln 30 g (30 g Oral Given 2/8/23 1313)   hydrOXYzine HCL tablet 50 mg (50 mg Oral Given 2/7/23 2222)   ARIPiprazole tablet 10 mg (10 mg Oral Given 2/7/23 2222)   multivitamin tablet (1 tablet Oral Given 2/8/23 0911)   pantoprazole EC tablet 40 mg (40 mg Oral Given 2/8/23 0912)   spironolactone tablet 25 mg (0 mg Oral Hold 2/8/23 0911)   thiamine tablet 100 mg (100 mg Oral Given 2/8/23 0911)   folic acid tablet 1 mg (1 mg Oral Given 2/8/23 0912)   lactulose 20 gram/30 mL solution Soln 10 g (10 g Oral Given 2/7/23 1503)   lactated ringers bolus 1,000 mL (0 mLs Intravenous Stopped 2/7/23 1730)     Medical Decision Making:   History:   Old Medical Records: I decided to obtain old medical records.  Initial Assessment:   58 yo F with PMH alcoholic cirrhosis, hepatic encephalopathy, seizure, bipolar disorder who presented to [facility] [place] [place] via [place] after call for AMS.  Patient is alert, but only oriented to self and place, confused about her current situation and appears somnolent.  Vital signs reviewed, bradycardia, but afebrile, no hypoxia.  No obvious head trauma  Differential Diagnosis:   Hepatic  encephalopathy, intracranial process, electrolyte derangement, lithium overdose, doubt intoxication  Clinical Tests:   Lab Tests: Ordered and Reviewed  Radiological Study: Ordered and Reviewed  Medical Tests: Ordered and Reviewed   MELD-Na score: 17 at 2/8/2023  3:38 AM  MELD score: 17 at 2/8/2023  3:38 AM  Calculated from:  Serum Creatinine: 0.7 mg/dL (Using min of 1 mg/dL) at 2/8/2023  3:38 AM  Serum Sodium: 141 mmol/L (Using max of 137 mmol/L) at 2/8/2023  3:38 AM  Total Bilirubin: 3.7 mg/dL at 2/8/2023  3:38 AM  INR(ratio): 1.6 at 2/8/2023  3:38 AM  Age: 57 years        Attending Attestation:   Physician Attestation Statement for Resident:  As the supervising MD   Physician Attestation Statement: I have personally seen and examined this patient.   I agree with the above history.  -:   As the supervising MD I agree with the above PE.     As the supervising MD I agree with the above treatment, course, plan, and disposition.                  ED Course as of 02/08/23 1330   Tue Feb 07, 2023   1450  Leukocytopenia and thrombocytopenia  [NC]   1451 Comprehensive metabolic panel(!)   No electrolyte derangement, normal renal function [NC]   1452 Ammonia(!): 131   Concerning for hepatic encephalopathy [NC]   1644 CT Head Without Contrast  No intracranial process [NC]   1644 Comprehensive metabolic panel(!)  Hyperchloremia, and non-anion gap metabolic acidosis, normal renal function [NC]   1645 Discussed with Hospital Medicine, will admit patient for hepatic encephalopathy likely from medication noncompliance. [NC]      ED Course User Index  [NC] Kevin Rollins MD                 Clinical Impression:   Final diagnoses:  [R41.82] AMS (altered mental status)  [K76.82] Hepatic encephalopathy (Primary)        ED Disposition Condition    Admit Stable                Kevin Rollins MD  Resident  02/07/23 1646       Brenton Clark MD  02/08/23 1331

## 2023-02-08 LAB
ALBUMIN SERPL BCP-MCNC: 2.9 G/DL (ref 3.5–5.2)
ALP SERPL-CCNC: 76 U/L (ref 55–135)
ALT SERPL W/O P-5'-P-CCNC: 15 U/L (ref 10–44)
ANION GAP SERPL CALC-SCNC: 9 MMOL/L (ref 8–16)
AST SERPL-CCNC: 34 U/L (ref 10–40)
BACTERIA #/AREA URNS AUTO: ABNORMAL /HPF
BASOPHILS # BLD AUTO: 0.03 K/UL (ref 0–0.2)
BASOPHILS NFR BLD: 0.6 % (ref 0–1.9)
BILIRUB SERPL-MCNC: 3.7 MG/DL (ref 0.1–1)
BILIRUB UR QL STRIP: NEGATIVE
BUN SERPL-MCNC: 13 MG/DL (ref 6–20)
CALCIUM SERPL-MCNC: 9.7 MG/DL (ref 8.7–10.5)
CHLORIDE SERPL-SCNC: 116 MMOL/L (ref 95–110)
CLARITY UR REFRACT.AUTO: ABNORMAL
CO2 SERPL-SCNC: 16 MMOL/L (ref 23–29)
COLOR UR AUTO: YELLOW
CREAT SERPL-MCNC: 0.7 MG/DL (ref 0.5–1.4)
DIFFERENTIAL METHOD: ABNORMAL
EOSINOPHIL # BLD AUTO: 0.2 K/UL (ref 0–0.5)
EOSINOPHIL NFR BLD: 3.6 % (ref 0–8)
ERYTHROCYTE [DISTWIDTH] IN BLOOD BY AUTOMATED COUNT: 16.6 % (ref 11.5–14.5)
EST. GFR  (NO RACE VARIABLE): >60 ML/MIN/1.73 M^2
GLUCOSE SERPL-MCNC: 98 MG/DL (ref 70–110)
GLUCOSE UR QL STRIP: NEGATIVE
HCT VFR BLD AUTO: 36.3 % (ref 37–48.5)
HGB BLD-MCNC: 12.1 G/DL (ref 12–16)
HGB UR QL STRIP: NEGATIVE
HYALINE CASTS UR QL AUTO: 22 /LPF
IMM GRANULOCYTES # BLD AUTO: 0.01 K/UL (ref 0–0.04)
IMM GRANULOCYTES NFR BLD AUTO: 0.2 % (ref 0–0.5)
INR PPP: 1.6 (ref 0.8–1.2)
KETONES UR QL STRIP: NEGATIVE
LEUKOCYTE ESTERASE UR QL STRIP: ABNORMAL
LITHIUM SERPL-SCNC: 0.6 MMOL/L (ref 0.6–1.2)
LYMPHOCYTES # BLD AUTO: 1.9 K/UL (ref 1–4.8)
LYMPHOCYTES NFR BLD: 39.9 % (ref 18–48)
MAGNESIUM SERPL-MCNC: 1.8 MG/DL (ref 1.6–2.6)
MCH RBC QN AUTO: 33.9 PG (ref 27–31)
MCHC RBC AUTO-ENTMCNC: 33.3 G/DL (ref 32–36)
MCV RBC AUTO: 102 FL (ref 82–98)
MICROSCOPIC COMMENT: ABNORMAL
MONOCYTES # BLD AUTO: 0.3 K/UL (ref 0.3–1)
MONOCYTES NFR BLD: 7.2 % (ref 4–15)
NEUTROPHILS # BLD AUTO: 2.3 K/UL (ref 1.8–7.7)
NEUTROPHILS NFR BLD: 48.5 % (ref 38–73)
NITRITE UR QL STRIP: NEGATIVE
NRBC BLD-RTO: 0 /100 WBC
PH UR STRIP: 6 [PH] (ref 5–8)
PHOSPHATE SERPL-MCNC: 2.9 MG/DL (ref 2.7–4.5)
PLATELET # BLD AUTO: 89 K/UL (ref 150–450)
PMV BLD AUTO: 9.9 FL (ref 9.2–12.9)
POTASSIUM SERPL-SCNC: 3.7 MMOL/L (ref 3.5–5.1)
PROT SERPL-MCNC: 5.9 G/DL (ref 6–8.4)
PROT UR QL STRIP: NEGATIVE
PROTHROMBIN TIME: 16.3 SEC (ref 9–12.5)
RBC # BLD AUTO: 3.57 M/UL (ref 4–5.4)
RBC #/AREA URNS AUTO: 2 /HPF (ref 0–4)
SODIUM SERPL-SCNC: 141 MMOL/L (ref 136–145)
SP GR UR STRIP: 1.02 (ref 1–1.03)
URN SPEC COLLECT METH UR: ABNORMAL
WBC # BLD AUTO: 4.71 K/UL (ref 3.9–12.7)
WBC #/AREA URNS AUTO: >100 /HPF (ref 0–5)

## 2023-02-08 PROCEDURE — 80053 COMPREHEN METABOLIC PANEL: CPT | Performed by: STUDENT IN AN ORGANIZED HEALTH CARE EDUCATION/TRAINING PROGRAM

## 2023-02-08 PROCEDURE — 36415 COLL VENOUS BLD VENIPUNCTURE: CPT | Performed by: STUDENT IN AN ORGANIZED HEALTH CARE EDUCATION/TRAINING PROGRAM

## 2023-02-08 PROCEDURE — 94761 N-INVAS EAR/PLS OXIMETRY MLT: CPT

## 2023-02-08 PROCEDURE — 81001 URINALYSIS AUTO W/SCOPE: CPT | Performed by: EMERGENCY MEDICINE

## 2023-02-08 PROCEDURE — 83735 ASSAY OF MAGNESIUM: CPT | Performed by: STUDENT IN AN ORGANIZED HEALTH CARE EDUCATION/TRAINING PROGRAM

## 2023-02-08 PROCEDURE — 12000002 HC ACUTE/MED SURGE SEMI-PRIVATE ROOM

## 2023-02-08 PROCEDURE — 25000003 PHARM REV CODE 250: Performed by: STUDENT IN AN ORGANIZED HEALTH CARE EDUCATION/TRAINING PROGRAM

## 2023-02-08 PROCEDURE — 87186 SC STD MICRODIL/AGAR DIL: CPT | Performed by: EMERGENCY MEDICINE

## 2023-02-08 PROCEDURE — 85025 COMPLETE CBC W/AUTO DIFF WBC: CPT | Performed by: STUDENT IN AN ORGANIZED HEALTH CARE EDUCATION/TRAINING PROGRAM

## 2023-02-08 PROCEDURE — 84100 ASSAY OF PHOSPHORUS: CPT | Performed by: STUDENT IN AN ORGANIZED HEALTH CARE EDUCATION/TRAINING PROGRAM

## 2023-02-08 PROCEDURE — 99233 PR SUBSEQUENT HOSPITAL CARE,LEVL III: ICD-10-PCS | Mod: ,,, | Performed by: STUDENT IN AN ORGANIZED HEALTH CARE EDUCATION/TRAINING PROGRAM

## 2023-02-08 PROCEDURE — 85610 PROTHROMBIN TIME: CPT | Performed by: STUDENT IN AN ORGANIZED HEALTH CARE EDUCATION/TRAINING PROGRAM

## 2023-02-08 PROCEDURE — 51798 US URINE CAPACITY MEASURE: CPT

## 2023-02-08 PROCEDURE — 87077 CULTURE AEROBIC IDENTIFY: CPT | Performed by: EMERGENCY MEDICINE

## 2023-02-08 PROCEDURE — 87088 URINE BACTERIA CULTURE: CPT | Performed by: EMERGENCY MEDICINE

## 2023-02-08 PROCEDURE — 99233 SBSQ HOSP IP/OBS HIGH 50: CPT | Mod: ,,, | Performed by: STUDENT IN AN ORGANIZED HEALTH CARE EDUCATION/TRAINING PROGRAM

## 2023-02-08 PROCEDURE — 80178 ASSAY OF LITHIUM: CPT | Performed by: STUDENT IN AN ORGANIZED HEALTH CARE EDUCATION/TRAINING PROGRAM

## 2023-02-08 PROCEDURE — 87086 URINE CULTURE/COLONY COUNT: CPT | Performed by: EMERGENCY MEDICINE

## 2023-02-08 RX ORDER — LACTULOSE 10 G/15ML
30 SOLUTION ORAL EVERY 4 HOURS
Status: DISCONTINUED | OUTPATIENT
Start: 2023-02-08 | End: 2023-02-10

## 2023-02-08 RX ADMIN — LACTULOSE 30 G: 20 SOLUTION ORAL at 01:02

## 2023-02-08 RX ADMIN — THIAMINE HCL TAB 100 MG 100 MG: 100 TAB at 09:02

## 2023-02-08 RX ADMIN — FOLIC ACID 1 MG: 1 TABLET ORAL at 09:02

## 2023-02-08 RX ADMIN — LACTULOSE 30 G: 20 SOLUTION ORAL at 09:02

## 2023-02-08 RX ADMIN — PANTOPRAZOLE SODIUM 40 MG: 40 TABLET, DELAYED RELEASE ORAL at 09:02

## 2023-02-08 RX ADMIN — ARIPIPRAZOLE 10 MG: 5 TABLET ORAL at 09:02

## 2023-02-08 RX ADMIN — THERA TABS 1 TABLET: TAB at 09:02

## 2023-02-08 RX ADMIN — LITHIUM CARBONATE 300 MG: 300 TABLET, FILM COATED, EXTENDED RELEASE ORAL at 09:02

## 2023-02-08 RX ADMIN — LEVETIRACETAM 1000 MG: 500 TABLET, FILM COATED ORAL at 09:02

## 2023-02-08 RX ADMIN — HYDROXYZINE HYDROCHLORIDE 50 MG: 25 TABLET, FILM COATED ORAL at 09:02

## 2023-02-08 RX ADMIN — LACTULOSE 30 G: 20 SOLUTION ORAL at 05:02

## 2023-02-08 NOTE — PROGRESS NOTES
[facility] ([address] Medicine  Progress Note    Patient Name: Marely Hamilton  MRN: [id number]  Patient Class: IP- Inpatient   Admission Date: 2/7/2023  Length of Stay: 1 days  Attending Physician: Sarbjit Tavarez MD  Primary Care Provider: Viktor Ross MD        Subjective:     Principal Problem:Hepatic encephalopathy        HPI:  Ms. Hamilton is a 57 yoF with a h/o alcoholic cirrhosis, hepatic encephalopathy, seizure, bipolar disorder who present with altered mental status.  History taken from chart review given patient only able to state that she is confused.  Per report, EMS was called by patient's sister given concern for worsening mental status.  Sister also states patient has become less able take care of herself and manage her medications.  Believes patient has not taking her lactulose in over a week.    In the ED, VSS.  Labs notable for creatinine 0.4, hemoglobin 12.5, bilirubin 3.1, platelets 59, INR 1.6, ammonia 131.  CT head unremarkable.            Overview/Hospital Course:  No notes on file    Interval History:   No events overnight. Patient more alert this morning, but reports continued confusion. No other complaints. Saldaña placed due to urinary retention. Attempted to call patient sister, Zaria, twice with no answer.       Review of Systems   Constitutional:  Negative for activity change, appetite change, fatigue and fever.   Respiratory:  Negative for shortness of breath.    Cardiovascular:  Negative for palpitations.   Gastrointestinal:  Negative for abdominal pain.   Musculoskeletal:  Negative for arthralgias.   Neurological:  Positive for weakness. Negative for speech difficulty.   Objective:     Vital Signs (Most Recent):  Temp: 98.7 °F (37.1 °C) (02/08/23 1122)  Pulse: 62 (02/08/23 1122)  Resp: 14 (02/08/23 1122)  BP: (!) 106/57 (02/08/23 1122)  SpO2: 96 % (02/08/23 1122)   Vital Signs (24h Range):  Temp:  [98.2 °F (36.8 °C)-98.7 °F (37.1 °C)] 98.7 °F (37.1  °C)  Pulse:  [50-63] 62  Resp:  [14-18] 14  SpO2:  [95 %-100 %] 96 %  BP: ()/(55-67) 106/57     Weight: 58.5 kg (129 lb)  Body mass index is 22.85 kg/m².    Intake/Output Summary (Last 24 hours) at 2/8/2023 1507  Last data filed at 2/8/2023 1330  Gross per 24 hour   Intake 1000 ml   Output 750 ml   Net 250 ml      Physical Exam  Constitutional:       General: She is not in acute distress.     Appearance: She is underweight.   HENT:      Head: Normocephalic and atraumatic.      Mouth/Throat:      Mouth: Mucous membranes are moist.   Eyes:      General: No scleral icterus.     Extraocular Movements: Extraocular movements intact.      Conjunctiva/sclera: Conjunctivae normal.   Cardiovascular:      Rate and Rhythm: Normal rate and regular rhythm.   Pulmonary:      Effort: Pulmonary effort is normal. No respiratory distress.      Breath sounds: Normal breath sounds. No wheezing or rales.   Abdominal:      General: Abdomen is flat. Bowel sounds are normal.      Palpations: Abdomen is soft.      Tenderness: There is no abdominal tenderness. There is no guarding.   Musculoskeletal:         General: No swelling or tenderness.   Skin:     Coloration: Skin is not jaundiced.      Findings: No rash.      Nails: There is clubbing.   Neurological:      Mental Status: She is alert. She is disoriented.   Psychiatric:         Behavior: Behavior is cooperative.       Significant Labs: All pertinent labs within the past 24 hours have been reviewed.  CBC:   Recent Labs   Lab 02/07/23  1356 02/08/23  0338   WBC 3.26* 4.71   HGB 12.5 12.1   HCT 40.1 36.3*   PLT 59* 89*     CMP:   Recent Labs   Lab 02/07/23  1356 02/08/23  0338    141   K 4.3 3.7   * 116*   CO2 14* 16*   * 98   BUN 16 13   CREATININE 0.9 0.7   CALCIUM 9.7 9.7   PROT 6.2 5.9*   ALBUMIN 3.1* 2.9*   BILITOT 3.1* 3.7*   ALKPHOS 91 76   AST 34 34   ALT 16 15   ANIONGAP 9 9     Coagulation:   Recent Labs   Lab 02/07/23  1424 02/08/23  0338   INR 1.6*  1.6*   APTT 32.3*  --        Significant Imaging: I have reviewed all pertinent imaging results/findings within the past 24 hours.      Assessment/Plan:      * Hepatic encephalopathy  See above      Cirrhosis, Capri's  MELD-Na score: 17 at 2/8/2023  3:38 AM  MELD score: 17 at 2/8/2023  3:38 AM  Calculated from:  Serum Creatinine: 0.7 mg/dL (Using min of 1 mg/dL) at 2/8/2023  3:38 AM  Serum Sodium: 141 mmol/L (Using max of 137 mmol/L) at 2/8/2023  3:38 AM  Total Bilirubin: 3.7 mg/dL at 2/8/2023  3:38 AM  INR(ratio): 1.6 at 2/8/2023  3:38 AM  Age: 57 years     - Lactulose 4 times daily  - Holding home Lasix given concern for dehydration  - Continue spironolactone   - Holding home propranolol given bradycardia  - Low-sodium diet, fluid restriction 1500 mL  - Nutrition consulted  - Mentation improving  - PT/OT    Bipolar 1 disorder, depressed, severe  - Continue home lithium  - Continue home Abilify  - Holding home Seroquel    Malnutrition  Nutrition consulted      Macrocytic anemia  Trend CBC      History of seizure  Continue home Keppra      Thrombocytopenia  Secondary to cirrhosis   Trend CBC      VTE Risk Mitigation (From admission, onward)         Ordered     IP VTE LOW RISK PATIENT  Once         02/07/23 1635     Place sequential compression device  Until discontinued         02/07/23 1635                Discharge Planning   BRAYAN:      Code Status: Full Code   Is the patient medically ready for discharge?:     Reason for patient still in hospital (select all that apply): Patient trending condition, Laboratory test, Treatment, PT / OT recommendations and Pending disposition  Discharge Plan A: Home, Home with family, Home Health ([facility]                  Sarbjit Tavarez MD  Department of Hospital Medicine   [facility] - Intensive Care ([place]-[facility])

## 2023-02-08 NOTE — H&P
[facility] - Emergency Dept  [facility] Medicine  History & Physical    Patient Name: Marely Hamilton  MRN: [id number]  Patient Class: IP- Inpatient  Admission Date: 2/7/2023  Attending Physician: Sarbjit Tavarez MD  Primary Care Provider: Viktor Ross MD         Patient information was obtained from past medical records and ER records.     Subjective:     Principal Problem:<principal problem not specified>    Chief Complaint:   Chief Complaint   Patient presents with    Altered Mental Status     Hasnt taken lactulose in week. Hx of cirrhosis. Also hasn't been eating or drinking.        HPI: Ms. Hamilton is a 57 yoF with a h/o alcoholic cirrhosis, hepatic encephalopathy, seizure, bipolar disorder who present with altered mental status.  History taken from chart review given patient only able to state that she is confused.  Per report, EMS was called by patient's sister given concern for worsening mental status.  Sister also states patient has become less able take care of herself and manage her medications.  Believes patient has not taking her lactulose in over a week.    In the ED, VSS.  Labs notable for creatinine 0.4, hemoglobin 12.5, bilirubin 3.1, platelets 59, INR 1.6, ammonia 131.  CT head unremarkable.            Past Medical History:   Diagnosis Date    Addiction to drug     Alcohol abuse     Alcohol abuse, in remission 6/15/2015    14.5 weeks ago; AA weekly    Anemia     Anxiety 6/15/2015    Behavioral problem     Bipolar disorder     Bipolar disorder in remission 6/15/2015    Cirrhosis, Laennec's 6/15/2015    Depression     Encounter for blood transfusion     Epistaxis 6/15/2015    Fatigue     Glaucoma     Hematuria     Hepatic encephalopathy 6/15/2015    Hepatic enlargement     History of psychiatric care     History of psychiatric hospitalization     History of seizure 6/15/2015    1    hx of intentional Tylenol overdose 6/15/2015    2005- situational and hx of bipolar    Hx of  psychiatric care     Jeana     Osteoarthritis     Other ascites 6/15/2015    Psychiatric exam requested by authority     Psychiatric problem     Psychosis 9/26/2019    Renal disorder     Seizures     Self-harming behavior     Suicide attempt     Therapy     Thrombocytopenia 6/15/2015       Past Surgical History:   Procedure Laterality Date    COSMETIC SURGERY      ESOPHAGOGASTRODUODENOSCOPY         Review of patient's allergies indicates:   Allergen Reactions    Sulfa (sulfonamide antibiotics) Rash    Codeine Nausea And Vomiting       No current facility-administered medications on file prior to encounter.     Current Outpatient Medications on File Prior to Encounter   Medication Sig    ARIPiprazole (ABILIFY) 10 MG Tab Take 10 mg by mouth nightly.    furosemide (LASIX) 20 MG tablet Take 0.5 tablets (10 mg total) by mouth 2 (two) times daily.    hydrOXYzine (ATARAX) 50 MG tablet Take 50 mg by mouth every evening.    lactulose (CHRONULAC) 10 gram/15 mL solution Take 45 mLs (30 g total) by mouth 3 (three) times daily.    levETIRAcetam (KEPPRA) 500 MG Tab Take 1,000 mg by mouth 2 (two) times daily.    lithium (LITHOTAB) 300 mg tablet Take 300 mg by mouth nightly.    mometasone (ELOCON) 0.1 % ointment Apply topically.    multivitamin Tab Take 1 tablet by mouth once daily.    pantoprazole (PROTONIX) 40 MG tablet Take 40 mg by mouth once daily.    propranoloL (INDERAL) 20 MG tablet Take 20 mg by mouth once daily.    QUEtiapine (SEROQUEL) 100 MG Tab Take 100 mg by mouth every evening.    spironolactone (ALDACTONE) 25 MG tablet Take 25 mg by mouth once daily.     Family History       Problem Relation (Age of Onset)    Alcohol abuse Father, Sister, Paternal Grandfather    Bipolar disorder Father    Hypertension Mother    Suicide Father          Tobacco Use    Smoking status: Never    Smokeless tobacco: Never   Substance and Sexual Activity    Alcohol use: Not Currently     Comment: hx of ETOH  abuse with cirrhosis of liver    Drug use: No    Sexual activity: Not Currently     Review of Systems   Unable to perform ROS: Mental status change   Objective:     Vital Signs (Most Recent):  Temp: 98.8 °F (37.1 °C) (02/07/23 1335)  Pulse: (!) 58 (02/07/23 1830)  Resp: 16 (02/07/23 1830)  BP: (!) 111/59 (02/07/23 1830)  SpO2: 99 % (02/07/23 1830)   Vital Signs (24h Range):  Temp:  [98.8 °F (37.1 °C)] 98.8 °F (37.1 °C)  Pulse:  [50-63] 58  Resp:  [16-22] 16  SpO2:  [95 %-100 %] 99 %  BP: (105-125)/(56-68) 111/59     Weight: 58.5 kg (129 lb)  Body mass index is 22.85 kg/m².    Physical Exam  Constitutional:       General: She is not in acute distress.     Appearance: She is underweight.   HENT:      Head: Normocephalic and atraumatic.      Mouth/Throat:      Mouth: Mucous membranes are moist.   Eyes:      General: No scleral icterus.     Extraocular Movements: Extraocular movements intact.      Conjunctiva/sclera: Conjunctivae normal.   Cardiovascular:      Rate and Rhythm: Normal rate and regular rhythm.   Pulmonary:      Effort: Pulmonary effort is normal. No respiratory distress.      Breath sounds: Normal breath sounds. No wheezing or rales.   Abdominal:      General: Abdomen is flat. Bowel sounds are normal.      Palpations: Abdomen is soft.      Tenderness: There is no abdominal tenderness. There is no guarding.   Musculoskeletal:         General: No swelling or tenderness.   Skin:     Coloration: Skin is not jaundiced.      Findings: No rash.      Nails: There is clubbing.   Neurological:      Mental Status: She is alert. She is disoriented.   Psychiatric:         Behavior: Behavior is cooperative.           Significant Labs: All pertinent labs within the past 24 hours have been reviewed.  CBC:   Recent Labs   Lab 02/07/23  1356   WBC 3.26*   HGB 12.5   HCT 40.1   PLT 59*     CMP:   Recent Labs   Lab 02/07/23  1356      K 4.3   *   CO2 14*   *   BUN 16   CREATININE 0.9   CALCIUM 9.7   PROT  6.2   ALBUMIN 3.1*   BILITOT 3.1*   ALKPHOS 91   AST 34   ALT 16   ANIONGAP 9       Significant Imaging: I have reviewed all pertinent imaging results/findings within the past 24 hours.    Assessment/Plan:     Cirrhosis, Capri's  MELD-Na score: 16 at 2/7/2023  2:24 PM  MELD score: 16 at 2/7/2023  2:24 PM  Calculated from:  Serum Creatinine: 0.9 mg/dL (Using min of 1 mg/dL) at 2/7/2023  1:56 PM  Serum Sodium: 140 mmol/L (Using max of 137 mmol/L) at 2/7/2023  1:56 PM  Total Bilirubin: 3.1 mg/dL at 2/7/2023  1:56 PM  INR(ratio): 1.6 at 2/7/2023  2:24 PM  Age: 57 years     - Lactulose 4 times daily  - Holding home Lasix and spironolactone given concern for dehydration  - Holding home propranolol given bradycardia  - Low-sodium diet, fluid restriction 1500 mL  - Nutrition consult  - Hepatology consult    Bipolar 1 disorder, depressed, severe  - Continue home lithium  - Follow-up lithium level  - Continue home Abilify  - Holding home Seroquel    Hepatic encephalopathy  See above      Malnutrition  Nutrition consulted      Macrocytic anemia        History of seizure  Continue home Keppra      Thrombocytopenia  Secondary to cirrhosis   Trend CBC        VTE Risk Mitigation (From admission, onward)         Ordered     IP VTE LOW RISK PATIENT  Once         02/07/23 1635     Place sequential compression device  Until discontinued         02/07/23 1635                   Sarbjit Tavarez MD  Department of Hospital Medicine   [facility] - Emergency Dept

## 2023-02-08 NOTE — CONSULTS
[facility] - Intensive Care ([facility]-)  Adult Nutrition  Consult Note    SUMMARY     Recommendations    1. Continue Low Na diet      2. Add Boost Plus TID      3. RD to monitor and follow    Goals: Meet % EEN, EPN by RD f/u  Nutrition Goal Status: new  Communication of RD Recs:  (POC)    Assessment and Plan    Nutrition Problem  Increased nutrient needs (protein, energy)    Related to (etiology):   Increased physiological demands    Signs and Symptoms (as evidenced by):   Liver cirrhosis    Interventions/Recommendations (treatment strategy):  Collaboration with other providers  ONS    Nutrition Diagnosis Status:   New      Malnutrition Assessment  Orbital Region (Subcutaneous Fat Loss): moderate depletion  Upper Arm Region (Subcutaneous Fat Loss): moderate depletion   Cumming Region (Muscle Loss): severe depletion  Clavicle Bone Region (Muscle Loss): severe depletion  Clavicle and Acromion Bone Region (Muscle Loss): severe depletion  Dorsal Hand (Muscle Loss): severe depletion  Anterior Thigh Region (Muscle Loss): moderate depletion  Posterior Calf Region (Muscle Loss): moderate depletion    Reason for Assessment    Reason For Assessment: consult  Diagnosis:  (Hepatic encephalopathy)  Relevant Medical History: cirrhosis, malnutrition  Interdisciplinary Rounds: did not attend    General Information Comments:   57 yoF with a h/o alcoholic cirrhosis, hepatic encephalopathy, seizure, bipolar disorder who present with altered mental status. Pt reports good appetite PTA, usually eat 3 meals per day, takes MV and Boost x1/d at home. Reports tolerated 50% of breakfast this AM. Denies N/V, chewing/swallowing difficulties. Pt unsure of UBW. Last recorded wt is 129lb from 11/2/2021, no significant wt changes. NFPE completed today, noted moderate-severe fat and muscle depletion. However, does not meet criteria for malnutrition at this time.     Nutrition Discharge Planning: Pending medical course    Nutrition Risk  "Screen    Nutrition Risk Screen: no indicators present    Anthropometrics    Temp: 98.7 °F (37.1 °C)  Height: 5' 3" (160 cm)  Height (inches): 63 in  Weight: 58.5 kg (129 lb)  Weight (lb): 129 lb  Ideal Body Weight (IBW), Female: 115 lb  % Ideal Body Weight, Female (lb): 112.17 %  BMI (Calculated): 22.9     Lab/Procedures/Meds    Pertinent Labs Reviewed: reviewed  Pertinent Labs Comments: Tbili 3.7  Pertinent Medications Reviewed: reviewed  Pertinent Medications Comments: MV, pantoprazole, spironolactone, thiamine, flic acid, lactulose    Estimated/Assessed Needs    Weight Used For Calorie Calculations: 58.5 kg (129 lb)  Energy Calorie Requirements (kcal): 7218-8663 kcal  Energy Need Method: Kcal/kg (25-30)  Protein Requirements: 70-82g (1.2-1.5g/kg)  Weight Used For Protein Calculations: 58.5 kg (129 lb)  Fluid Requirements (mL): 1ml/kcal or per MD  Estimated Fluid Requirement Method: RDA Method  RDA Method (mL): 1462     Nutrition Prescription Ordered    Current Diet Order: Low Na  Nutrition Order Comments: 1500ml FR    Evaluation of Received Nutrient/Fluid Intake    I/O: +1 L since admit  Energy Calories Required: not meeting needs  Protein Required: not meeting needs  Comments: LBM 2/3  Tolerance: tolerating    Nutrition Risk    Level of Risk/Frequency of Follow-up:  (1x/week)     Monitor and Evaluation    Food and Nutrient Intake: energy intake, food and beverage intake  Food and Nutrient Adminstration: diet order  Physical Activity and Function: nutrition-related ADLs and IADLs  Anthropometric Measurements: height/length, weight, body mass index, weight change  Biochemical Data, Medical Tests and Procedures: electrolyte and renal panel, gastrointestinal profile, glucose/endocrine profile, inflammatory profile, lipid profile  Nutrition-Focused Physical Findings: overall appearance     Nutrition Follow-Up    RD Follow-up?: Yes    "

## 2023-02-08 NOTE — PLAN OF CARE
Recommendations    1. Continue Low Na diet      2. Add Boost Plus TID      3. RD to monitor and follow    Goals: Meet % EEN, EPN by RD f/u  Nutrition Goal Status: new  Communication of RD Recs:  (POC)

## 2023-02-08 NOTE — HPI
Ms. Hamilton is a 57 yoF with a h/o alcoholic cirrhosis, hepatic encephalopathy, seizure, bipolar disorder who present with altered mental status.  History taken from chart review given patient only able to state that she is confused.  Per report, EMS was called by patient's sister given concern for worsening mental status.  Sister also states patient has become less able take care of herself and manage her medications.  Believes patient has not taking her lactulose in over a week.    In the ED, VSS.  Labs notable for creatinine 0.4, hemoglobin 12.5, bilirubin 3.1, platelets 59, INR 1.6, ammonia 131.  CT head unremarkable.

## 2023-02-08 NOTE — PLAN OF CARE
[facility] - Intensive Care ([place] [address])  Initial Discharge Assessment       Primary Care Provider: Viktor Ross MD    Admission Diagnosis: Hepatic encephalopathy [K76.82]  AMS (altered mental status) [R41.82]    Admission Date: 2/7/2023  Expected Discharge Date:     Discharge Barriers Identified: None    Payor: HU[employer] MANAGED MEDICARE / Plan: HU[employer] MEDICARE HMO / Product Type: Capitation /     Extended Emergency Contact Information  Primary Emergency Contact: Zaria Hamilton   [facility]  Home Phone: [phone]  Relation: Sister  Secondary Emergency Contact: Anastasia Hamilton   [place]  Mobile Phone: [phone]  Relation: Mother    Discharge Plan A: Home, Home with family, [facility] ([facility]  Discharge Plan B: Skilled Nursing Facility      [facility] - [facility] - [address]  [address]  [place] [address]  Phone: [phone] Fax: [phone]      Initial Assessment (most recent)       Adult Discharge Assessment - 02/08/23 1456          Discharge Assessment    Confirmed/corrected address, phone number and insurance Yes     Confirmed Demographics Correct on Facesheet     Source of Information health record;other (see comments)   Additional information obtained from Bertha robison/ [facility].    Communicated BRAYAN with patient/caregiver Date not available/Unable to determine     Reason For Admission Hepatic Encephalopathy     People in Home parent(s)   Pt lives with her mother who also requires a caregiver.    Do you expect to return to your current living situation? Yes     Do you have help at home or someone to help you manage your care at home? Yes     Who are your caregiver(s) and their phone number(s)? Per records, pt has hired sitters to help both she and her mother 4 hours a day - 6 days a week.  Sitters information is unknown at this time.     Prior to hospitilization cognitive status: Unable to Assess      Current cognitive status: Unable to Assess     Walking or Climbing Stairs ambulation difficulty, requires equipment     Mobility Management Walker, rolling, wheelchair.     Equipment Currently Used at Home walker, rolling;wheelchair;bedside commode     Readmission within 30 days? Yes     Do you currently have service(s) that help you manage your care at home? Yes     Name and Contact number of agency Pt recently enrolled with [facility].     Is the pt/caregiver preference to resume services with current agency Yes     Do you take prescription medications? Yes     Do you have prescription coverage? Yes     Coverage [employer]     Do you have any problems affording any of your prescribed medications? No     Is the patient taking medications as prescribed? yes     Who is going to help you get home at discharge? Pt may need transportation home if [facility] is unable to provide transportation.     How do you get to doctors appointments? family or friend will provide;other (see comments)   sitter service provides transportation.    Are you on dialysis? No     Do you take coumadin? No     Discharge Plan A Home;Home with family;[facility]   [facility] and [facility].    Discharge Plan B Skilled Nursing Facility     DME Needed Upon Discharge  other (see comments)   TBD    Discharge Plan discussed with: --   Unable to discuss with anyone at this time.  Left message for sister.    Discharge Barriers Identified None        OTHER    Name(s) of People in Home Pt lives with her elder mother.                      ELSI met with pt and was unable to complete assessment due to pt's mental status.  Pt has hospital readmissions within the last 30 days.  ELSI telephoned pt's sister, Zaria [phone], and was advised she is at work and unable to talk but will call ELSI back on her break.  Information for assessment obtained from clinicals.      Pt lives with her  elderly mother  in a [facility] with 1 step for entry into home. Pt uses a walker, wheelchair, and bedside commode as needed.  Pt has paid sitter services with [place].  Pt has caregives 4 hours a day for 6 days a week.  Sitter serive provides care to both pt and her mother.  Sitter service available from 9am to 1p.m.  Pt also has [facility].  Per  staff, pt has coginitive deficiencies.      Pt does not receive coumadin or dialysis.  Pt does have a history of mental health ([facility]).    Disp:  Tentative- Home w/ HH and sitter service ([facility] and [place] [facility]), Care at Home.  Pt refused placement in the past.      Emily De La Rosa LMSW  PRN-  [facility]  Ext. [phone]

## 2023-02-08 NOTE — PLAN OF CARE
Problem: Skin Injury Risk Increased  Goal: Skin Health and Integrity  Outcome: Ongoing, Progressing     Problem: Infection  Goal: Absence of Infection Signs and Symptoms  Outcome: Ongoing, Progressing     Problem: Fluid Volume Excess  Goal: Fluid Balance  Outcome: Ongoing, Progressing     Problem: Urinary Retention  Goal: Effective Urinary Elimination  Outcome: Ongoing, Progressing

## 2023-02-08 NOTE — SUBJECTIVE & OBJECTIVE
Past Medical History:   Diagnosis Date    Addiction to drug     Alcohol abuse     Alcohol abuse, in remission 6/15/2015    14.5 weeks ago; AA weekly    Anemia     Anxiety 6/15/2015    Behavioral problem     Bipolar disorder     Bipolar disorder in remission 6/15/2015    Cirrhosis, Laennec's 6/15/2015    Depression     Encounter for blood transfusion     Epistaxis 6/15/2015    Fatigue     Glaucoma     Hematuria     Hepatic encephalopathy 6/15/2015    Hepatic enlargement     History of psychiatric care     History of psychiatric hospitalization     History of seizure 6/15/2015    1    hx of intentional Tylenol overdose 6/15/2015    2005- situational and hx of bipolar    Hx of psychiatric care     Jeana     Osteoarthritis     Other ascites 6/15/2015    Psychiatric exam requested by authority     Psychiatric problem     Psychosis 9/26/2019    Renal disorder     Seizures     Self-harming behavior     Suicide attempt     Therapy     Thrombocytopenia 6/15/2015       Past Surgical History:   Procedure Laterality Date    COSMETIC SURGERY      ESOPHAGOGASTRODUODENOSCOPY         Review of patient's allergies indicates:   Allergen Reactions    Sulfa (sulfonamide antibiotics) Rash    Codeine Nausea And Vomiting       No current facility-administered medications on file prior to encounter.     Current Outpatient Medications on File Prior to Encounter   Medication Sig    ARIPiprazole (ABILIFY) 10 MG Tab Take 10 mg by mouth nightly.    furosemide (LASIX) 20 MG tablet Take 0.5 tablets (10 mg total) by mouth 2 (two) times daily.    hydrOXYzine (ATARAX) 50 MG tablet Take 50 mg by mouth every evening.    lactulose (CHRONULAC) 10 gram/15 mL solution Take 45 mLs (30 g total) by mouth 3 (three) times daily.    levETIRAcetam (KEPPRA) 500 MG Tab Take 1,000 mg by mouth 2 (two) times daily.    lithium (LITHOTAB) 300 mg tablet Take 300 mg by mouth nightly.    mometasone (ELOCON) 0.1 % ointment Apply topically.    multivitamin Tab Take 1 tablet  by mouth once daily.    pantoprazole (PROTONIX) 40 MG tablet Take 40 mg by mouth once daily.    propranoloL (INDERAL) 20 MG tablet Take 20 mg by mouth once daily.    QUEtiapine (SEROQUEL) 100 MG Tab Take 100 mg by mouth every evening.    spironolactone (ALDACTONE) 25 MG tablet Take 25 mg by mouth once daily.     Family History       Problem Relation (Age of Onset)    Alcohol abuse Father, Sister, Paternal Grandfather    Bipolar disorder Father    Hypertension Mother    Suicide Father          Tobacco Use    Smoking status: Never    Smokeless tobacco: Never   Substance and Sexual Activity    Alcohol use: Not Currently     Comment: hx of ETOH abuse with cirrhosis of liver    Drug use: No    Sexual activity: Not Currently     Review of Systems   Unable to perform ROS: Mental status change   Objective:     Vital Signs (Most Recent):  Temp: 98.8 °F (37.1 °C) (02/07/23 1335)  Pulse: (!) 58 (02/07/23 1830)  Resp: 16 (02/07/23 1830)  BP: (!) 111/59 (02/07/23 1830)  SpO2: 99 % (02/07/23 1830)   Vital Signs (24h Range):  Temp:  [98.8 °F (37.1 °C)] 98.8 °F (37.1 °C)  Pulse:  [50-63] 58  Resp:  [16-22] 16  SpO2:  [95 %-100 %] 99 %  BP: (105-125)/(56-68) 111/59     Weight: 58.5 kg (129 lb)  Body mass index is 22.85 kg/m².    Physical Exam  Constitutional:       General: She is not in acute distress.     Appearance: She is underweight.   HENT:      Head: Normocephalic and atraumatic.      Mouth/Throat:      Mouth: Mucous membranes are moist.   Eyes:      General: No scleral icterus.     Extraocular Movements: Extraocular movements intact.      Conjunctiva/sclera: Conjunctivae normal.   Cardiovascular:      Rate and Rhythm: Normal rate and regular rhythm.   Pulmonary:      Effort: Pulmonary effort is normal. No respiratory distress.      Breath sounds: Normal breath sounds. No wheezing or rales.   Abdominal:      General: Abdomen is flat. Bowel sounds are normal.      Palpations: Abdomen is soft.      Tenderness: There is no  abdominal tenderness. There is no guarding.   Musculoskeletal:         General: No swelling or tenderness.   Skin:     Coloration: Skin is not jaundiced.      Findings: No rash.      Nails: There is clubbing.   Neurological:      Mental Status: She is alert. She is disoriented.   Psychiatric:         Behavior: Behavior is cooperative.           Significant Labs: All pertinent labs within the past 24 hours have been reviewed.  CBC:   Recent Labs   Lab 02/07/23  1356   WBC 3.26*   HGB 12.5   HCT 40.1   PLT 59*     CMP:   Recent Labs   Lab 02/07/23  1356      K 4.3   *   CO2 14*   *   BUN 16   CREATININE 0.9   CALCIUM 9.7   PROT 6.2   ALBUMIN 3.1*   BILITOT 3.1*   ALKPHOS 91   AST 34   ALT 16   ANIONGAP 9       Significant Imaging: I have reviewed all pertinent imaging results/findings within the past 24 hours.

## 2023-02-08 NOTE — ASSESSMENT & PLAN NOTE
- Continue home lithium  - Follow-up lithium level  - Continue home Abilify  - Holding home Seroquel

## 2023-02-08 NOTE — ASSESSMENT & PLAN NOTE
MELD-Na score: 17 at 2/8/2023  3:38 AM  MELD score: 17 at 2/8/2023  3:38 AM  Calculated from:  Serum Creatinine: 0.7 mg/dL (Using min of 1 mg/dL) at 2/8/2023  3:38 AM  Serum Sodium: 141 mmol/L (Using max of 137 mmol/L) at 2/8/2023  3:38 AM  Total Bilirubin: 3.7 mg/dL at 2/8/2023  3:38 AM  INR(ratio): 1.6 at 2/8/2023  3:38 AM  Age: 57 years     - Lactulose 4 times daily  - Holding home Lasix given concern for dehydration  - Continue spironolactone   - Holding home propranolol given bradycardia  - Low-sodium diet, fluid restriction 1500 mL  - Nutrition consulted  - Mentation improving  - PT/OT

## 2023-02-08 NOTE — SUBJECTIVE & OBJECTIVE
Interval History:   No events overnight. Patient more alert this morning, but reports continued confusion. No other complaints. Saldaña placed due to urinary retention. Attempted to call patient sister, Zaria, twice with no answer.       Review of Systems   Constitutional:  Negative for activity change, appetite change, fatigue and fever.   Respiratory:  Negative for shortness of breath.    Cardiovascular:  Negative for palpitations.   Gastrointestinal:  Negative for abdominal pain.   Musculoskeletal:  Negative for arthralgias.   Neurological:  Positive for weakness. Negative for speech difficulty.   Objective:     Vital Signs (Most Recent):  Temp: 98.7 °F (37.1 °C) (02/08/23 1122)  Pulse: 62 (02/08/23 1122)  Resp: 14 (02/08/23 1122)  BP: (!) 106/57 (02/08/23 1122)  SpO2: 96 % (02/08/23 1122)   Vital Signs (24h Range):  Temp:  [98.2 °F (36.8 °C)-98.7 °F (37.1 °C)] 98.7 °F (37.1 °C)  Pulse:  [50-63] 62  Resp:  [14-18] 14  SpO2:  [95 %-100 %] 96 %  BP: ()/(55-67) 106/57     Weight: 58.5 kg (129 lb)  Body mass index is 22.85 kg/m².    Intake/Output Summary (Last 24 hours) at 2/8/2023 1507  Last data filed at 2/8/2023 1330  Gross per 24 hour   Intake 1000 ml   Output 750 ml   Net 250 ml      Physical Exam  Constitutional:       General: She is not in acute distress.     Appearance: She is underweight.   HENT:      Head: Normocephalic and atraumatic.      Mouth/Throat:      Mouth: Mucous membranes are moist.   Eyes:      General: No scleral icterus.     Extraocular Movements: Extraocular movements intact.      Conjunctiva/sclera: Conjunctivae normal.   Cardiovascular:      Rate and Rhythm: Normal rate and regular rhythm.   Pulmonary:      Effort: Pulmonary effort is normal. No respiratory distress.      Breath sounds: Normal breath sounds. No wheezing or rales.   Abdominal:      General: Abdomen is flat. Bowel sounds are normal.      Palpations: Abdomen is soft.      Tenderness: There is no abdominal tenderness.  There is no guarding.   Musculoskeletal:         General: No swelling or tenderness.   Skin:     Coloration: Skin is not jaundiced.      Findings: No rash.      Nails: There is clubbing.   Neurological:      Mental Status: She is alert. She is disoriented.   Psychiatric:         Behavior: Behavior is cooperative.       Significant Labs: All pertinent labs within the past 24 hours have been reviewed.  CBC:   Recent Labs   Lab 02/07/23  1356 02/08/23  0338   WBC 3.26* 4.71   HGB 12.5 12.1   HCT 40.1 36.3*   PLT 59* 89*     CMP:   Recent Labs   Lab 02/07/23  1356 02/08/23  0338    141   K 4.3 3.7   * 116*   CO2 14* 16*   * 98   BUN 16 13   CREATININE 0.9 0.7   CALCIUM 9.7 9.7   PROT 6.2 5.9*   ALBUMIN 3.1* 2.9*   BILITOT 3.1* 3.7*   ALKPHOS 91 76   AST 34 34   ALT 16 15   ANIONGAP 9 9     Coagulation:   Recent Labs   Lab 02/07/23  1424 02/08/23  0338   INR 1.6* 1.6*   APTT 32.3*  --        Significant Imaging: I have reviewed all pertinent imaging results/findings within the past 24 hours.

## 2023-02-09 PROBLEM — E83.39 HYPOPHOSPHATEMIA: Status: ACTIVE | Noted: 2023-02-09

## 2023-02-09 PROBLEM — R79.1 ELEVATED INR: Status: ACTIVE | Noted: 2023-02-09

## 2023-02-09 LAB
ALBUMIN SERPL BCP-MCNC: 2.7 G/DL (ref 3.5–5.2)
ALP SERPL-CCNC: 86 U/L (ref 55–135)
ALT SERPL W/O P-5'-P-CCNC: 15 U/L (ref 10–44)
ANION GAP SERPL CALC-SCNC: 9 MMOL/L (ref 8–16)
AST SERPL-CCNC: 35 U/L (ref 10–40)
BASOPHILS # BLD AUTO: 0.02 K/UL (ref 0–0.2)
BASOPHILS NFR BLD: 0.5 % (ref 0–1.9)
BILIRUB SERPL-MCNC: 3 MG/DL (ref 0.1–1)
BUN SERPL-MCNC: 9 MG/DL (ref 6–20)
CALCIUM SERPL-MCNC: 9.2 MG/DL (ref 8.7–10.5)
CHLORIDE SERPL-SCNC: 116 MMOL/L (ref 95–110)
CO2 SERPL-SCNC: 13 MMOL/L (ref 23–29)
CREAT SERPL-MCNC: 0.8 MG/DL (ref 0.5–1.4)
DIFFERENTIAL METHOD: ABNORMAL
EOSINOPHIL # BLD AUTO: 0.1 K/UL (ref 0–0.5)
EOSINOPHIL NFR BLD: 3.2 % (ref 0–8)
ERYTHROCYTE [DISTWIDTH] IN BLOOD BY AUTOMATED COUNT: 16.1 % (ref 11.5–14.5)
EST. GFR  (NO RACE VARIABLE): >60 ML/MIN/1.73 M^2
GLUCOSE SERPL-MCNC: 96 MG/DL (ref 70–110)
HCT VFR BLD AUTO: 34.1 % (ref 37–48.5)
HGB BLD-MCNC: 11.4 G/DL (ref 12–16)
IMM GRANULOCYTES # BLD AUTO: 0.01 K/UL (ref 0–0.04)
IMM GRANULOCYTES NFR BLD AUTO: 0.2 % (ref 0–0.5)
INR PPP: 1.6 (ref 0.8–1.2)
LYMPHOCYTES # BLD AUTO: 1.6 K/UL (ref 1–4.8)
LYMPHOCYTES NFR BLD: 36.9 % (ref 18–48)
MAGNESIUM SERPL-MCNC: 1.7 MG/DL (ref 1.6–2.6)
MCH RBC QN AUTO: 33.3 PG (ref 27–31)
MCHC RBC AUTO-ENTMCNC: 33.4 G/DL (ref 32–36)
MCV RBC AUTO: 100 FL (ref 82–98)
MONOCYTES # BLD AUTO: 0.4 K/UL (ref 0.3–1)
MONOCYTES NFR BLD: 9.9 % (ref 4–15)
NEUTROPHILS # BLD AUTO: 2.2 K/UL (ref 1.8–7.7)
NEUTROPHILS NFR BLD: 49.3 % (ref 38–73)
NRBC BLD-RTO: 0 /100 WBC
PHOSPHATE SERPL-MCNC: 2.5 MG/DL (ref 2.7–4.5)
PLATELET # BLD AUTO: 95 K/UL (ref 150–450)
PMV BLD AUTO: 10 FL (ref 9.2–12.9)
POTASSIUM SERPL-SCNC: 3.1 MMOL/L (ref 3.5–5.1)
PROT SERPL-MCNC: 5.6 G/DL (ref 6–8.4)
PROTHROMBIN TIME: 16.4 SEC (ref 9–12.5)
RBC # BLD AUTO: 3.42 M/UL (ref 4–5.4)
SODIUM SERPL-SCNC: 138 MMOL/L (ref 136–145)
WBC # BLD AUTO: 4.44 K/UL (ref 3.9–12.7)

## 2023-02-09 PROCEDURE — 84100 ASSAY OF PHOSPHORUS: CPT | Performed by: STUDENT IN AN ORGANIZED HEALTH CARE EDUCATION/TRAINING PROGRAM

## 2023-02-09 PROCEDURE — 80053 COMPREHEN METABOLIC PANEL: CPT | Performed by: STUDENT IN AN ORGANIZED HEALTH CARE EDUCATION/TRAINING PROGRAM

## 2023-02-09 PROCEDURE — 63600175 PHARM REV CODE 636 W HCPCS: Performed by: HOSPITALIST

## 2023-02-09 PROCEDURE — 25000003 PHARM REV CODE 250: Performed by: STUDENT IN AN ORGANIZED HEALTH CARE EDUCATION/TRAINING PROGRAM

## 2023-02-09 PROCEDURE — 87040 BLOOD CULTURE FOR BACTERIA: CPT | Mod: 59 | Performed by: HOSPITALIST

## 2023-02-09 PROCEDURE — 97165 OT EVAL LOW COMPLEX 30 MIN: CPT

## 2023-02-09 PROCEDURE — 99233 SBSQ HOSP IP/OBS HIGH 50: CPT | Mod: ,,, | Performed by: HOSPITALIST

## 2023-02-09 PROCEDURE — 36415 COLL VENOUS BLD VENIPUNCTURE: CPT | Performed by: HOSPITALIST

## 2023-02-09 PROCEDURE — 97116 GAIT TRAINING THERAPY: CPT

## 2023-02-09 PROCEDURE — 85610 PROTHROMBIN TIME: CPT | Performed by: STUDENT IN AN ORGANIZED HEALTH CARE EDUCATION/TRAINING PROGRAM

## 2023-02-09 PROCEDURE — 99233 PR SUBSEQUENT HOSPITAL CARE,LEVL III: ICD-10-PCS | Mod: ,,, | Performed by: HOSPITALIST

## 2023-02-09 PROCEDURE — 36415 COLL VENOUS BLD VENIPUNCTURE: CPT | Performed by: STUDENT IN AN ORGANIZED HEALTH CARE EDUCATION/TRAINING PROGRAM

## 2023-02-09 PROCEDURE — 85025 COMPLETE CBC W/AUTO DIFF WBC: CPT | Performed by: STUDENT IN AN ORGANIZED HEALTH CARE EDUCATION/TRAINING PROGRAM

## 2023-02-09 PROCEDURE — 83735 ASSAY OF MAGNESIUM: CPT | Performed by: STUDENT IN AN ORGANIZED HEALTH CARE EDUCATION/TRAINING PROGRAM

## 2023-02-09 PROCEDURE — 12000002 HC ACUTE/MED SURGE SEMI-PRIVATE ROOM

## 2023-02-09 PROCEDURE — 51702 INSERT TEMP BLADDER CATH: CPT

## 2023-02-09 PROCEDURE — 25000003 PHARM REV CODE 250: Performed by: HOSPITALIST

## 2023-02-09 PROCEDURE — 97161 PT EVAL LOW COMPLEX 20 MIN: CPT

## 2023-02-09 PROCEDURE — 97535 SELF CARE MNGMENT TRAINING: CPT

## 2023-02-09 RX ORDER — SODIUM,POTASSIUM PHOSPHATES 280-250MG
2 POWDER IN PACKET (EA) ORAL ONCE
Status: COMPLETED | OUTPATIENT
Start: 2023-02-09 | End: 2023-02-09

## 2023-02-09 RX ORDER — SODIUM BICARBONATE 650 MG/1
1300 TABLET ORAL 2 TIMES DAILY
Status: DISCONTINUED | OUTPATIENT
Start: 2023-02-09 | End: 2023-02-09

## 2023-02-09 RX ORDER — POTASSIUM CHLORIDE 20 MEQ/1
40 TABLET, EXTENDED RELEASE ORAL ONCE
Status: COMPLETED | OUTPATIENT
Start: 2023-02-09 | End: 2023-02-09

## 2023-02-09 RX ORDER — SODIUM BICARBONATE 650 MG/1
1300 TABLET ORAL 3 TIMES DAILY
Status: DISCONTINUED | OUTPATIENT
Start: 2023-02-09 | End: 2023-02-10

## 2023-02-09 RX ADMIN — LACTULOSE 30 G: 20 SOLUTION ORAL at 10:02

## 2023-02-09 RX ADMIN — THIAMINE HCL TAB 100 MG 100 MG: 100 TAB at 10:02

## 2023-02-09 RX ADMIN — LACTULOSE 30 G: 20 SOLUTION ORAL at 05:02

## 2023-02-09 RX ADMIN — LITHIUM CARBONATE 300 MG: 300 TABLET, FILM COATED, EXTENDED RELEASE ORAL at 08:02

## 2023-02-09 RX ADMIN — HYDROXYZINE HYDROCHLORIDE 50 MG: 25 TABLET, FILM COATED ORAL at 08:02

## 2023-02-09 RX ADMIN — POTASSIUM & SODIUM PHOSPHATES POWDER PACK 280-160-250 MG 2 PACKET: 280-160-250 PACK at 10:02

## 2023-02-09 RX ADMIN — LEVETIRACETAM 1000 MG: 500 TABLET, FILM COATED ORAL at 08:02

## 2023-02-09 RX ADMIN — SODIUM BICARBONATE 1300 MG: 650 TABLET ORAL at 08:02

## 2023-02-09 RX ADMIN — PANTOPRAZOLE SODIUM 40 MG: 40 TABLET, DELAYED RELEASE ORAL at 10:02

## 2023-02-09 RX ADMIN — LACTULOSE 30 G: 20 SOLUTION ORAL at 06:02

## 2023-02-09 RX ADMIN — LEVETIRACETAM 1000 MG: 500 TABLET, FILM COATED ORAL at 10:02

## 2023-02-09 RX ADMIN — THERA TABS 1 TABLET: TAB at 10:02

## 2023-02-09 RX ADMIN — SODIUM BICARBONATE 1300 MG: 650 TABLET ORAL at 03:02

## 2023-02-09 RX ADMIN — FOLIC ACID 1 MG: 1 TABLET ORAL at 10:02

## 2023-02-09 RX ADMIN — POTASSIUM CHLORIDE 40 MEQ: 1500 TABLET, EXTENDED RELEASE ORAL at 10:02

## 2023-02-09 RX ADMIN — ARIPIPRAZOLE 10 MG: 5 TABLET ORAL at 08:02

## 2023-02-09 RX ADMIN — LACTULOSE 30 G: 20 SOLUTION ORAL at 01:02

## 2023-02-09 RX ADMIN — SPIRONOLACTONE 25 MG: 25 TABLET, FILM COATED ORAL at 10:02

## 2023-02-09 RX ADMIN — CEFTRIAXONE 1 G: 1 INJECTION, POWDER, FOR SOLUTION INTRAMUSCULAR; INTRAVENOUS at 10:02

## 2023-02-09 NOTE — PT/OT/SLP EVAL
Occupational Therapy   Co-Evaluation & Treatment    Name: Marely Hamilton  MRN: [id number]  Admitting Diagnosis:  Hepatic encephalopathy    Length of Stay: 2 days    Recommendations:     Discharge Recommendations: nursing facility, skilled  Discharge Equipment Recommendations:  other (see comments) (TBD pending progress)  Barriers to discharge:  Decreased caregiver support, Other (Comment) (impaired cognition requiring 24/7 (A)/Sup)    Plan:     Patient to be seen 3 x/week to address the above listed problems via self-care/home management, therapeutic activities, therapeutic exercises, neuromuscular re-education  Plan of Care Expires: 03/11/23  Plan of Care Reviewed with: patient      Assessment:     Marely Hamilton is a 57 y.o. female with a medical diagnosis of Hepatic encephalopathy.  She presents with the following performance deficits affecting function: weakness, impaired endurance, impaired self care skills, impaired functional mobility, gait instability, impaired balance, impaired cognition, decreased upper extremity function, decreased lower extremity function, decreased safety awareness, impaired fine motor.      Pt mainly limited by impaired gait pattern on this date. Pt requiring SBA for bed mobility, CGA-Min A for functional mobility (gait) with RW and HHA, and SBA-Min A for ADLs (grooming, LE dressing). Pt's strengths include strong desire to regain independence. Pt with good motivation and fair tolerance for therapy. Pt would benefit from SNF OT upon D/C to return to [facility].    Rehab Prognosis: Good; patient would benefit from acute skilled OT services to address these deficits and reach maximum level of function.         Subjective     Communicated with: RN prior to session. Patient found supine with bed alarm, peripheral IV, baugh catheter and no family present upon OT entry to room.    Chief Complaint: Altered Mental Status (Hasnt taken lactulose in week. Hx of cirrhosis. Also hasn't been eating or  "drinking.)    Patient/Family Comments/goals: Pt requested to walk a further distance during functional mobility in hallway    Pain/Comfort:  Pain Rating 1: 0/10  Pain Rating Post-Intervention 1: 0/10    Patients cultural, spiritual, Gnosticism conflicts given the current situation: no    Occupational Profile:  Living Environment: Pt lives with her mother in a H with 1 DAKOTA. Pt's bathroom has a WIS, there is another WIS available with a built-in bench.  Prior Level of Function: Patient reports being Mod I with mobility & with ADLs, however she requires assist with bathing. She has hired sitters who come for 4h/day, 4d/wk, primarily to assist her mother, however they do assist her with showering. Pt uses a RW PRN for mobility.  Patient uses DME as follows: RW.   DME owned (not currently used): [employer], W/C.  Roles/Responsibilities:   Work: No, previously a .   Drive: No, [place] sitters provide.   Managing Medicines/Managing Home: Yes.     Patient reports they will have limited assistance from sitters (no 24/7 assist) upon discharge.      Objective:     Patient found with: bed alarm, peripheral IV, baugh catheter   General Precautions: Standard, fall   Orthopedic Precautions:N/A   Braces: N/A   Oxygen Device: Room air  Vitals: /65 (Patient Position: Lying)   Pulse 69   Temp 98.4 °F (36.9 °C) (Oral)   Resp 18   Ht 5' 3" (1.6 m)   Wt 58.5 kg (129 lb)   SpO2 (!) 94%   BMI 22.85 kg/m²     Cognitive and Psychosocial Function:   AxOx3 Person, place, situation; able to state month as February, unable to name year   Command-Following  Follows 2-step commands with increased processing time   Communication Slowed speech, often inappropriate gradation of volume   Memory Impaired   Safety Awareness Impaired   Affect Flat, cooperative     Hearing: WFL    Vision: No deficits noted    Physical Exam:  Postural examination/scapula alignment:    -       Rounded shoulders  -       Forward head     Left UE Right UE   UE " Edema Absent Absent   UE ROM WFL WFL   UE Strength Grossly 4/5 Grossly 4/5    Strength WFL WFL   Sensation No deficits noted No deficits noted   Fine Motor Coordination Impaired (Finger-to-Nose test, manipulation of objects) Impaired (Finger-to-Nose test, manipulation of objects)   Gross Motor Coordination Intact Intact     Occupational Performance:  Bed Mobility:    Scooting to HOB in supine: Sup  Patient completed supine to sit with SBA on right side of bed  Scooting anteriorly to EOB to have both feet planted on floor: SBA  Patient completed sit to supine with SBA on left side of bed    Functional Mobility/Transfers:  Static Sitting EOB: Sup  Patient completed Sit <> Stand Transfer with CGA with RW  Static Standing Balance: CGA  Functional Mobility: Pt engaging in functional mobility to simulate household/community distances in order to maximize functional activity tolerance and standing balance required for engagement in occupations of choice  Trial 1 (bedside > bathroom): utilizing HHA and Min A  Trial 2 (bathroom > hallway > bedside): utilizing RW and CGA   LOB: Absent  SOB: Absent  Dizziness/Lightheadedness: Absent    Activities of Daily Living:  Grooming: SBA to perform oral hygiene while standing at sink  Upper Body Dressing: Min A to doff/down gown while standing  Lower Body Dressing: Min A to don B  socks with assist for threading L foot    AMPAC 6 Click ADL:  AMPAC Total Score: 18    Treatment & Education:  Educated on role of OT, POC, and goals for this hospital stay  Emphasized importance of OOB ax and level of staff assistance required for transfers and functional mobility (CGA with RW)  Encouraged pt to alert OT of personal self-care goals and/or comfort-related concerns during future OT sessions  Pt denies any further questions, concerns, or requests at this time        Patient left supine with all lines intact, call button in reach, and bed alarm on    GOALS:   Multidisciplinary Problems        Occupational Therapy Goals          Problem: Occupational Therapy    Goal Priority Disciplines Outcome Interventions   Occupational Therapy Goal     OT, PT/OT Ongoing, Progressing    Description: Goals to be met by: 2/23/2023    Patient will increase functional independence with ADLs by performing:    Feeding with [place].  UE Dressing with [place].  LE Dressing with [place].  Grooming while standing at sink with Modified [place] using AD PRN.  Toileting from toilet with [place] for hygiene and clothing management.   Toilet transfer to toilet with Modified [place] using AD PRN.  Step transfer with Modified [place] using AD PRN.                           History:     Past Medical History:   Diagnosis Date    Addiction to drug     Alcohol abuse     Alcohol abuse, in remission 6/15/2015    14.5 weeks ago; AA weekly    Anemia     Anxiety 6/15/2015    Behavioral problem     Bipolar disorder     Bipolar disorder in remission 6/15/2015    Cirrhosis, Laennec's 6/15/2015    Depression     Encounter for blood transfusion     Epistaxis 6/15/2015    Fatigue     Glaucoma     Hematuria     Hepatic encephalopathy 6/15/2015    Hepatic enlargement     History of psychiatric care     History of psychiatric hospitalization     History of seizure 6/15/2015    1    hx of intentional Tylenol overdose 6/15/2015    2005- situational and hx of bipolar    Hx of psychiatric care     Macrocytic anemia 9/18/2015    6 units PRBC since June 2015    Jeana     Osteoarthritis     Other ascites 6/15/2015    Psychiatric exam requested by authority     Psychiatric problem     Psychosis 9/26/2019    Renal disorder     Seizures     Self-harming behavior     Suicide attempt     Therapy     Thrombocytopenia 6/15/2015         Past Surgical History:   Procedure Laterality Date    COSMETIC SURGERY      ESOPHAGOGASTRODUODENOSCOPY           Time Tracking:     OT Date of Treatment: 02/09/23  OT Start Time: 1104  OT Stop  Time: 1130  OT Total Time (min): 26 min  Additional staff present: PT  Co-evaluation performed to safely facilitate mobility and functional tasks concurrently for comprehensive assessment.      Billable Minutes:  Evaluation 16  Self Care/Home Management 10      2/9/2023

## 2023-02-09 NOTE — PLAN OF CARE
Problem: Occupational Therapy  Goal: Occupational Therapy Goal  Description: Goals to be met by: 2/23/2023    Patient will increase functional independence with ADLs by performing:    Feeding with [place].  UE Dressing with [place].  LE Dressing with [place].  Grooming while standing at sink with Modified [place] using AD PRN.  Toileting from toilet with [place] for hygiene and clothing management.   Toilet transfer to toilet with Modified [place] using AD PRN.  Step transfer with Modified [place] using AD PRN.    Outcome: Ongoing, Progressing     Evaluation complete; goals and POC established. Recommending SNF upon discharge to return to [facility].    2/9/2023

## 2023-02-09 NOTE — ASSESSMENT & PLAN NOTE
Patient's with macrocytic anemia.. Hemoglobin stable. Etiology likely due to chronic disease . B12 and folate levels   Current CBC reviewed-  Recent Labs   Lab 02/07/23  1356 02/08/23  0338 02/09/23  0223   HGB 12.5 12.1 11.4*     Monitor CBC and transfuse if H/H drops below 7/21.

## 2023-02-09 NOTE — ASSESSMENT & PLAN NOTE
MELD-Na score: 16 at 2/9/2023  2:23 AM  MELD score: 16 at 2/9/2023  2:23 AM  Calculated from:  Serum Creatinine: 0.8 mg/dL (Using min of 1 mg/dL) at 2/9/2023  2:22 AM  Serum Sodium: 138 mmol/L (Using max of 137 mmol/L) at 2/9/2023  2:22 AM  Total Bilirubin: 3.0 mg/dL at 2/9/2023  2:22 AM  INR(ratio): 1.6 at 2/9/2023  2:23 AM  Age: 57 years     - Lactulose 4 times daily  - Holding home Lasix given concern for dehydration  - Continue spironolactone   - Holding home propranolol given bradycardia  - Low-sodium diet, fluid restriction 1500 mL  - Nutrition consulted  - Mentation improving  - PT/OT

## 2023-02-09 NOTE — ASSESSMENT & PLAN NOTE
See above  Patient with hepatic enephalopathy  Recent Labs   Lab 02/07/23  1356   AMMONIA 131*     continue lactulose TID with goal of 3-4 bowel movements daily.  2/9 Alert and oriented to person, place. follows simple commands

## 2023-02-09 NOTE — ASSESSMENT & PLAN NOTE
patient with INR Recent Labs   Lab 02/07/23  1424 02/08/23  0338 02/09/23  0223   INR 1.6* 1.6* 1.6*   . INR is supratherapeutic. secondary to coagulopathy from liver disease.monitor

## 2023-02-09 NOTE — ASSESSMENT & PLAN NOTE
Secondary to cirrhosis   Trend CBC  2/9     Patient with thrombocytopenia Recent Labs   Lab 02/07/23  1356 02/08/23  0338 02/09/23  0223   PLT 59* 89* 95*   . Platelet counts stable.monitor

## 2023-02-09 NOTE — HOSPITAL COURSE
2/9 K and P replaced. UA + UC pending. stared on IV ceftriaxone . CX ray -No significant intrathoracic abnormality.and BCx 2 . sono abdomen with no ascitis  2/10  SVT 160s overnight ,asymptomatic improved with  1L NS bolus initiated and carotid massage . converted to NSR on EKG. K, MG and P replaced. 9 episodes of BMs yesterday. lactulose held. severe metabolic acidosis likely from diarrhea. started on sodium bicarbonate drip . UC - GRAM NEGATIVE BILLY >100,000 cfu/ml Identification and susceptibility pending .ENTEROCOCCUS SPECIES > 100,000 cfu/ml dentification and susceptibility pending. started on amoxicillin  2/11 BCX 2 NGTD. E coli sensitive to ceftriaxone and Enterococcus sensitive to amoxicillin. Ammonia trended down to 58. Bicarb at 21. Saldaña cath discontinued. resumed lactulose at  10g TID  2/12  Mg and K replaced . 2BMs yesterday.   2/14 lactulose increased to 30g TID. sister preferes [facility]. discharge to [facility] today

## 2023-02-09 NOTE — PLAN OF CARE
PT Evaluation completed and PT POC established.    Problem: Physical Therapy  Goal: Physical Therapy Goal  Description: Goals to be met by: 2023     Patient will increase functional independence with mobility by performin. Supine to sit with [place]  2. Sit to supine with [place]  3. Sit to stand transfer with Supervision  4. Bed to chair transfer with Supervision using LRAD  5. Gait  x 150 feet with Supervision using LRAD.   6. Ascend/descend 1 stair with no Handrails Stand-by Assistance using LRAD.     Outcome: Ongoing, Progressing

## 2023-02-09 NOTE — PT/OT/SLP EVAL
"Physical Therapy Co-Evaluation    Patient Name:  Marely Hamilton   MRN:  [id number]    Recommendations:     Discharge Recommendations: nursing facility, skilled   Discharge Equipment Recommendations:  (TBD)   Barriers to discharge: Pt currently requiring increased level of assistance with mobility    Assessment:     Marely Hamilton is a 57 y.o. female admitted with a medical diagnosis of Hepatic encephalopathy.  She presents with the following impairments/functional limitations: weakness, impaired endurance, impaired functional mobility, gait instability, impaired balance, decreased coordination, impaired cognition, decreased safety awareness. AAOx3, intermittently confused. Attempted to assess finger to nose coordination and MMT, however demonstrated difficulty with differentiating L and R and following ~50-75% simple commands with cues. Pt amb in keating with RW and presented with improved steadiness compared to without AD. Pt mobility limited due to cognition status. Pt will benefit from skilled PT services while in house in order to address the aforementioned deficits.      Rehab Prognosis: Good; patient would benefit from acute skilled PT services to address these deficits and reach maximum level of function.    Recent Surgery: * No surgery found *      Plan:     During this hospitalization, patient to be seen 3 x/week to address the identified rehab impairments via therapeutic activities, gait training, therapeutic exercises, neuromuscular re-education and progress toward the following goals:    Plan of Care Expires:  03/09/23    Subjective     "We can try to walk more"    Pain/Comfort:  Pain Rating 1: 0/10  Pain Rating Post-Intervention 1: 0/10    Patients cultural, spiritual, Yarsani conflicts given the current situation: no    Living Environment:  Per EMR, Pt lives with elderly mom in a [facility], 1 DAKOTA without HR, and walk in shower  Prior to admission, patients level of function was Mod I / Independent with " ADL's and mobility. Patient and her mom do have hired help that come in from 9am - 1pm 6 days a week that assist her mom and perform heavier housework. Equipment used at home: walker, rolling, bedside commode, wheelchair.  DME owned (not currently used): rolling walker, bedside commode, and wheelchair.  Upon discharge, patient will have assistance from unknown;  does have hired help 4 hours a day 6 days a week for mother and housework; pt requires assist with bathing  Objective:     Communicated with RN prior to session.  Patient found HOB elevated with bed alarm, peripheral IV  upon PT entry to room.    General Precautions: Standard, fall  Orthopedic Precautions:N/A   Braces: N/A  Respiratory Status: Room air    Exams:  Cognitive Exam:  Patient is oriented to Person, Place, and Situation  RLE ROM: WFL  RLE Strength: Grossly 3+/5  LLE ROM: WFL  LLE Strength: Grossly 3+/5    Functional Mobility:  Bed Mobility:     Scooting: stand by assistance  Supine to Sit: stand by assistance  Transfers:     Sit to Stand:  contact guard assistance with no AD  Gait: Pt amb 10' HHA [place] + 120' RW CGA. With AD, pt presented with improved steadiness, reciprocal gait, decreased alex  Balance:   Good sitting balance  Fair standing balance RW      AM-PAC 6 CLICK MOBILITY  Total Score:18       Treatment & Education:  Educated pt on PT role/POC  Educated pt on importance of OOB activity and daily ambulation   Pt educated on proper body mechanics, safety techniques, and energy conservation with PT facilitation and cueing throughout session   Pt verbalized understanding      Patient left HOB elevated with all lines intact, call button in reach, bed alarm on, RN notified, and OT present.    GOALS:   Multidisciplinary Problems       Physical Therapy Goals          Problem: Physical Therapy    Goal Priority Disciplines Outcome Goal Variances Interventions   Physical Therapy Goal     PT, PT/OT Ongoing, Progressing     Description: Goals to be  met by: 2023     Patient will increase functional independence with mobility by performin. Supine to sit with [place]  2. Sit to supine with [place]  3. Sit to stand transfer with Supervision  4. Bed to chair transfer with Supervision using LRAD  5. Gait  x 150 feet with Supervision using LRAD.   6. Ascend/descend 1 stair with no Handrails Stand-by Assistance using LRAD.                          History:     Past Medical History:   Diagnosis Date    Addiction to drug     Alcohol abuse     Alcohol abuse, in remission 6/15/2015    14.5 weeks ago; AA weekly    Anemia     Anxiety 6/15/2015    Behavioral problem     Bipolar disorder     Bipolar disorder in remission 6/15/2015    Cirrhosis, Laennec's 6/15/2015    Depression     Encounter for blood transfusion     Epistaxis 6/15/2015    Fatigue     Glaucoma     Hematuria     Hepatic encephalopathy 6/15/2015    Hepatic enlargement     History of psychiatric care     History of psychiatric hospitalization     History of seizure 6/15/2015    1    hx of intentional Tylenol overdose 6/15/2015    2005- situational and hx of bipolar    Hx of psychiatric care     Macrocytic anemia 2015    6 units PRBC since 2015    Jeana     Osteoarthritis     Other ascites 6/15/2015    Psychiatric exam requested by authority     Psychiatric problem     Psychosis 2019    Renal disorder     Seizures     Self-harming behavior     Suicide attempt     Therapy     Thrombocytopenia 6/15/2015       Past Surgical History:   Procedure Laterality Date    COSMETIC SURGERY      ESOPHAGOGASTRODUODENOSCOPY         Time Tracking:     PT Received On: 23  PT Start Time: 1105     PT Stop Time: 1130  PT Total Time (min): 25 min     Billable Minutes: Evaluation 10 and Gait Training 15    Co-treatment performed due to patient's multiple deficits requiring two skilled therapists to appropriately and safely assess patient's strength and endurance while facilitating functional  tasks in addition to accommodating for patient's activity tolerance.       02/09/2023

## 2023-02-09 NOTE — PLAN OF CARE
Problem: Adult Inpatient Plan of Care  Goal: Absence of Hospital-Acquired Illness or Injury  Outcome: Ongoing, Progressing  Goal: Optimal Comfort and Wellbeing  Outcome: Ongoing, Progressing     Problem: Skin Injury Risk Increased  Goal: Skin Health and Integrity  Outcome: Ongoing, Progressing     Problem: Infection  Goal: Absence of Infection Signs and Symptoms  Outcome: Ongoing, Progressing     Problem: Fluid Volume Excess  Goal: Fluid Balance  Outcome: Ongoing, Progressing     Problem: Urinary Retention  Goal: Effective Urinary Elimination  2/9/2023 1725 by Jose L Bang RN  Outcome: Ongoing, Progressing

## 2023-02-09 NOTE — PROGRESS NOTES
[facility] ([address] Medicine  Progress Note    Patient Name: Marely Hamilton  MRN: [id number]  Patient Class: IP- Inpatient   Admission Date: 2/7/2023  Length of Stay: 2 days  Attending Physician: Seth Noyola MD  Primary Care Provider: Viktor Ross MD        Subjective:     Principal Problem:Hepatic encephalopathy        HPI:  Ms. Hamilton is a 57 yoF with a h/o alcoholic cirrhosis, hepatic encephalopathy, seizure, bipolar disorder who present with altered mental status.  History taken from chart review given patient only able to state that she is confused.  Per report, EMS was called by patient's sister given concern for worsening mental status.  Sister also states patient has become less able take care of herself and manage her medications.  Believes patient has not taking her lactulose in over a week.    In the ED, VSS.  Labs notable for creatinine 0.4, hemoglobin 12.5, bilirubin 3.1, platelets 59, INR 1.6, ammonia 131.  CT head unremarkable.          Overview/Hospital Course:  2/9 K and P replaced. UA + UC pending. stared on IV ceftriaxone . CX ray -No significant intrathoracic abnormality.and BCx 2 . sono abdomen to rule out ascitis        Review of Systems:   Pain scale:   Unable to perform ROS: Mental status change   Constitutional:  fever,  chills, headache, vision loss, hearing loss, weight loss, Generalized weakness, falls, loss of smell, loss of taste, poor appetite,  sore throat  Respiratory: cough, shortness of breath.   Cardiovascular: chest pain, dizziness, palpitations, orthopnea, swelling of feet, syncope  Gastrointestinal: nausea, vomiting, abdominal pain- suprapubic, diarrhea, black stool,  blood in stool, change in bowel habits  Genitourinary: hematuria, dysuria, urgency, frequency  Integument/Breast: rash,  pruritis  Hematologic/Lymphatic: easy bruising, lymphadenopathy  Musculoskeletal: arthralgias , myalgias, back pain, neck pain, knee pain  Neurological:  confusion, seizures, tremors, slurred speech  Behavioral/Psych:  depression, anxiety, auditory or visual hallucinations     OBJECTIVE:     Physical Exam:  Body mass index is 22.85 kg/m².    Constitutional: Appears well-developed and well-nourished.   Head: Normocephalic and atraumatic.   Neck: Normal range of motion. Neck supple.   Cardiovascular: Normal heart rate.  Regular heart rhythm.  Pulmonary/Chest: Effort normal.   Abdominal: No distension.  + suprapubic  tenderness  Musculoskeletal: Normal range of motion. No edema.   Neurological: Alert and oriented to person, place. follows simple commands  Skin: Skin is warm and dry.   Psychiatric: Normal mood and affect. Behavior is normal.                  Vital Signs  Temp: 98.9 °F (37.2 °C) (02/09/23 1543)  Pulse: 71 (02/09/23 1543)  Resp: 18 (02/09/23 1543)  BP: 126/64 (02/09/23 1543)  SpO2: (Abnormal) 94 % (02/09/23 1543)       24 Hour VS Range    Temp:  [97 °F (36.1 °C)-98.9 °F (37.2 °C)]   Pulse:  [66-73]   Resp:  [17-20]   BP: (106-138)/(58-65)   SpO2:  [93 %-97 %]     Intake/Output Summary (Last 24 hours) at 2/9/2023 1924  Last data filed at 2/9/2023 1035  Gross per 24 hour   Intake 360 ml   Output 650 ml   Net -290 ml         I/O This Shift:  No intake/output data recorded.    Wt Readings from Last 3 Encounters:   02/09/23 58.5 kg (129 lb)   01/27/23 58.9 kg (129 lb 13.6 oz)   11/02/21 58.9 kg (129 lb 13.6 oz)       I have personally reviewed the vitals and recorded Intake/Output     Laboratory/Diagnostic Data:    CBC/Anemia Labs: Coags:    Recent Labs   Lab 02/07/23  1356 02/08/23  0338 02/09/23  0223   WBC 3.26* 4.71 4.44   HGB 12.5 12.1 11.4*   HCT 40.1 36.3* 34.1*   PLT 59* 89* 95*   * 102* 100*   RDW 16.9* 16.6* 16.1*    Recent Labs   Lab 02/07/23  1424 02/08/23  0338 02/09/23 0223   INR 1.6* 1.6* 1.6*   APTT 32.3*  --   --         Chemistries: ABG:   Recent Labs   Lab 02/07/23  1356 02/08/23  0338 02/09/23 0222    141 138   K 4.3 3.7 3.1*    * 116* 116*   CO2 14* 16* 13*   BUN 16 13 9   CREATININE 0.9 0.7 0.8   CALCIUM 9.7 9.7 9.2   PROT 6.2 5.9* 5.6*   BILITOT 3.1* 3.7* 3.0*   ALKPHOS 91 76 86   ALT 16 15 15   AST 34 34 35   MG  --  1.8 1.7   PHOS  --  2.9 2.5*    No results for input(s): PH, PCO2, PO2, HCO3, POCSATURATED, BE in the last 168 hours.     POCT Glucose: HbA1c:    No results for input(s): POCTGLUCOSE in the last 168 hours. Hemoglobin A1C   Date Value Ref Range Status   01/22/2021 4.1 4.0 - 5.6 % Final     Comment:     ADA Screening Guidelines:  5.7-6.4%  Consistent with prediabetes  >or=6.5%  Consistent with diabetes  High levels of fetal hemoglobin interfere with the HbA1C  assay. Heterozygous hemoglobin variants (HbS, HgC, etc)do  not significantly interfere with this assay.   However, presence of multiple variants may affect accuracy.     12/10/2020 4.6 4.0 - 5.6 % Final     Comment:     ADA Screening Guidelines:  5.7-6.4%  Consistent with prediabetes  >or=6.5%  Consistent with diabetes  High levels of fetal hemoglobin interfere with the HbA1C  assay. Heterozygous hemoglobin variants (HbS, HgC, etc)do  not significantly interfere with this assay.   However, presence of multiple variants may affect accuracy.     05/16/2020 4.1 4.0 - 5.6 % Final     Comment:     ADA Screening Guidelines:  5.7-6.4%  Consistent with prediabetes  >or=6.5%  Consistent with diabetes  High levels of fetal hemoglobin interfere with the HbA1C  assay. Heterozygous hemoglobin variants (HbS, HgC, etc)do  not significantly interfere with this assay.   However, presence of multiple variants may affect accuracy.          Cardiac Enzymes: Ejection Fractions:    No results for input(s): CPK, CPKMB, MB, TROPONINI in the last 72 hours. No results found for: EF       Recent Labs   Lab 02/08/23  1230   COLORU Yellow   APPEARANCEUA Hazy*   PHUR 6.0   SPECGRAV 1.020   PROTEINUA Negative   GLUCUA Negative   KETONESU Negative   BILIRUBINUA Negative   OCCULTUA Negative    NITRITE Negative   LEUKOCYTESUR 3+*   RBCUA 2   WBCUA >100*   BACTERIA Many*   HYALINECASTS 22*       Lactate (Lactic Acid) (mmol/L)   Date Value   11/02/2021 1.8   01/11/2021 2.0   01/15/2017 2.3 (H)     No results found for: BNP  No results found for: CRP, SEDRATE  No results found for: DDIMER  Ferritin (ng/mL)   Date Value   10/23/2015 412 (H)   09/19/2015 599 (H)   09/19/2015 599 (H)   07/23/2015 551 (H)   06/17/2015 612 (H)     LD (U/L)   Date Value   09/19/2015 280 (H)     Troponin I (ng/mL)   Date Value   01/25/2023 <0.006     CPK (U/L)   Date Value   01/25/2023 27   06/14/2020 23 (L)     No results found for this or any previous visit.  SARS-CoV-2 RNA, Amplification, Qual (no units)   Date Value   11/02/2021 Negative   01/21/2021 Negative   01/11/2021 Negative   11/21/2020 Negative   06/16/2020 Negative       Microbiology labs for the last week  Microbiology Results (last 7 days)       Procedure Component Value Units Date/Time    Blood culture [[id number]] Collected: 02/09/23 0847    Order Status: Completed Specimen: Blood Updated: 02/09/23 1545     Blood Culture, Routine No Growth to date    Blood culture [[id number]] Collected: 02/09/23 0847    Order Status: Completed Specimen: Blood Updated: 02/09/23 1545     Blood Culture, Routine No Growth to date    Urine culture [[id number]] Collected: 02/08/23 1230    Order Status: No result Specimen: Urine Updated: 02/08/23 1622            Reviewed and noted in plan where applicable- Please see chart for full lab data.    Lines/Drains:       Peripheral IV - Single Lumen 02/07/23 1425 20 G Anterior;Right Upper Arm (Active)   Site Assessment Clean;Dry;Intact 02/09/23 0800   Extremity Assessment Distal to IV No swelling;No redness;No abnormal discoloration 02/09/23 0800   Line Status Saline locked 02/09/23 0800   Dressing Status Clean;Dry;Intact 02/09/23 0800   Dressing Intervention Integrity maintained 02/09/23 0800   Dressing Change Due 02/11/23 02/09/23 0800   Site  "Change Due 02/11/23 02/09/23 0800   Reason Not Rotated Not due 02/09/23 0800   Number of days: 2            Urethral Catheter 02/08/23 1240 16 Fr. (Active)   $ Saldaña Insertion Bedside Insertion Performed 02/09/23 1305   Site Assessment Clean;Intact 02/09/23 0800   Collection Container Urimeter 02/09/23 0800   Securement Method secured to top of thigh w/ adhesive device 02/09/23 0800   Catheter Care Performed yes 02/09/23 0800   Reason for Continuing Urinary Catheterization Urinary retention 02/09/23 0800   CAUTI Prevention Bundle Securement Device in place with 1" slack 02/09/23 0800   Output (mL) 200 mL 02/09/23 0800   Number of days: 1       Imaging  ECG Results              EKG 12-lead (Final result)  Result time 02/07/23 15:57:30      Final result by Interface, Lab [facility] (02/07/23 15:57:30)               Narrative:    Test Reason : R41.82,    Vent. Rate : 056 BPM     Atrial Rate : 056 BPM     P-R Int : 152 ms          QRS Dur : 082 ms      QT Int : 438 ms       P-R-T Axes : 077 036 087 degrees     QTc Int : 422 ms    Artifact  Sinus bradycardia  Nonspecific T wave abnormality  Abnormal ECG  When compared with ECG of 25-JAN-2023 11:13,  Poor data quality in current ECG precludes serial comparison    Confirmed by Ayush Hay MD ([id number]) on 2/7/2023 3:57:21 PM    Referred By: [email or web address]   SELF           Confirmed By:Ayush Hay MD                                  Results for orders placed during the hospital encounter of 11/21/20    Echo Color Flow Doppler? Yes    Interpretation Summary  · The left ventricle is normal in size with normal systolic function. The estimated ejection fraction is 55%.  · There is left ventricular concentric remodeling.  · Normal left ventricular diastolic function.  · Normal right ventricular size with normal right ventricular systolic function.  · Normal central venous pressure (3 mmHg).      X-Ray Chest 1 View  Narrative: EXAMINATION:  XR CHEST 1 VIEW    CLINICAL HISTORY:  r/o " pneumonia;    TECHNIQUE:  One view    COMPARISON:  Comparison is made to 01/25/2023.    FINDINGS:  Heart size is normal, as is the appearance of the pulmonary vascularity.  Lung zones are clear, and are free of significant airspace consolidation or volume loss.  No pleural fluid.  No hilar or mediastinal mass lesion.  No pneumothorax.  Impression: No significant intrathoracic abnormality.  No significant detrimental interval change in the appearance of the chest since 01/25/2023 is appreciated.    Electronically signed by: Americo Reyes MD  Date:    02/09/2023  Time:    11:12  US Abdomen Limited  Narrative: EXAMINATION:  US ABDOMEN LIMITED    CLINICAL HISTORY:  r/o ascitis;    TECHNIQUE:  Limited ultrasound evaluation of the abdomen was performed to evaluate for ascites.    COMPARISON:  Abdominal ultrasound 01/25/2023.  CT abdomen pelvis 01/21/2021.    FINDINGS:  Limited sonographic evaluation of the all 4 abdominal quadrants demonstrates no significant ascites.    Redemonstrated dilated varices within the right lower quadrant.  Impression: No significant ascites.    Electronically signed by resident: Wilian Daily  Date:    02/09/2023  Time:    10:15    Electronically signed by: Eriberto Holliday  Date:    02/09/2023  Time:    10:49      Labs, Imaging, EKG and Diagnostic results from 2/9/2023 were reviewed.    Medications:  Medication list was reviewed and changes noted under Assessment/Plan.  No current facility-administered medications on file prior to encounter.     Current Outpatient Medications on File Prior to Encounter   Medication Sig Dispense Refill    ARIPiprazole (ABILIFY) 10 MG Tab Take 10 mg by mouth nightly.      furosemide (LASIX) 20 MG tablet Take 0.5 tablets (10 mg total) by mouth 2 (two) times daily. 30 tablet 1    hydrOXYzine (ATARAX) 50 MG tablet Take 50 mg by mouth every evening.      lactulose (CHRONULAC) 10 gram/15 mL solution Take 45 mLs (30 g total) by mouth 3 (three) times daily. 3784 mL 3     levETIRAcetam (KEPPRA) 500 MG Tab Take 1,000 mg by mouth 2 (two) times daily.      lithium (LITHOTAB) 300 mg tablet Take 300 mg by mouth nightly.      mometasone (ELOCON) 0.1 % ointment Apply topically.      multivitamin Tab Take 1 tablet by mouth once daily.      pantoprazole (PROTONIX) 40 MG tablet Take 40 mg by mouth once daily.      propranoloL (INDERAL) 20 MG tablet Take 20 mg by mouth once daily.      QUEtiapine (SEROQUEL) 100 MG Tab Take 100 mg by mouth every evening.      spironolactone (ALDACTONE) 25 MG tablet Take 25 mg by mouth once daily.       Scheduled Medications:  ARIPiprazole, 10 mg, Oral, Nightly  cefTRIAXone (ROCEPHIN) IVPB, 1 g, Intravenous, Q24H  folic acid, 1 mg, Oral, Daily  hydrOXYzine, 50 mg, Oral, QHS  lactulose, 30 g, Oral, Q4H  levETIRAcetam, 1,000 mg, Oral, BID  lithium, 300 mg, Oral, QHS  multivitamin, 1 tablet, Oral, Daily  pantoprazole, 40 mg, Oral, Daily  sodium bicarbonate, 1,300 mg, Oral, TID  spironolactone, 25 mg, Oral, Daily  thiamine, 100 mg, Oral, Daily      PRN: acetaminophen, melatonin, naloxone, ondansetron, polyethylene glycol, prochlorperazine, sodium chloride 0.9%  Infusions:   Estimated Creatinine Clearance: 65 mL/min (based on SCr of 0.8 mg/dL).     Assessment/Plan:      * Hepatic encephalopathy  See above  Patient with hepatic enephalopathy  Recent Labs   Lab 02/07/23  1356   AMMONIA 131*     continue lactulose TID with goal of 3-4 bowel movements daily.  2/9 Alert and oriented to person, place. follows simple commands    Elevated INR  patient with INR Recent Labs   Lab 02/07/23  1424 02/08/23  0338 02/09/23  0223   INR 1.6* 1.6* 1.6*   . INR is supratherapeutic. secondary to coagulopathy from liver disease.monitor      Hypophosphatemia  replaced      Hypokalemia    replaced     UTI (urinary tract infection)  2/9  UA + UC pending. stared on IV ceftriaxone .       Bipolar 1 disorder, depressed, severe  - Continue home lithium  - Continue home Abilify  - Holding home  Seroquel    Malnutrition  Nutrition consulted      Anemia    Patient's with macrocytic anemia.. Hemoglobin stable. Etiology likely due to chronic disease . B12 and folate levels   Current CBC reviewed-  Recent Labs   Lab 02/07/23  1356 02/08/23  0338 02/09/23 0223   HGB 12.5 12.1 11.4*     Monitor CBC and transfuse if H/H drops below 7/21.       History of seizure  Continue home Keppra      Thrombocytopenia  Secondary to cirrhosis   Trend CBC  2/9     Patient with thrombocytopenia Recent Labs   Lab 02/07/23  1356 02/08/23  0338 02/09/23 0223   PLT 59* 89* 95*   . Platelet counts stable.monitor    Cirrhosis, Laennec's  MELD-Na score: 16 at 2/9/2023  2:23 AM  MELD score: 16 at 2/9/2023  2:23 AM  Calculated from:  Serum Creatinine: 0.8 mg/dL (Using min of 1 mg/dL) at 2/9/2023  2:22 AM  Serum Sodium: 138 mmol/L (Using max of 137 mmol/L) at 2/9/2023  2:22 AM  Total Bilirubin: 3.0 mg/dL at 2/9/2023  2:22 AM  INR(ratio): 1.6 at 2/9/2023  2:23 AM  Age: 57 years     - Lactulose 4 times daily  - Holding home Lasix given concern for dehydration  - Continue spironolactone   - Holding home propranolol given bradycardia  - Low-sodium diet, fluid restriction 1500 mL  - Nutrition consulted  - Mentation improving  - PT/OT      VTE Risk Mitigation (From admission, onward)           Ordered     IP VTE LOW RISK PATIENT  Once         02/07/23 1635     Place sequential compression device  Until discontinued         02/07/23 1635                    Discharge Planning   BRAYAN:      Code Status: Full Code   Is the patient medically ready for discharge?: No    Reason for patient still in hospital (select all that apply): Treatment  Discharge Plan A: Home, Home with family, Home Health, Other ([facility], [facility], and [facility])   Discharge Delays: None known at this time              Seth Noyola MD  Department of Hospital Medicine   [facility] - Intensive [facility] ([place])

## 2023-02-09 NOTE — PLAN OF CARE
Problem: Adult Inpatient Plan of Care  Goal: Plan of Care Review  Outcome: Ongoing, Progressing  Goal: Patient-Specific Goal (Individualized)  Outcome: Ongoing, Progressing  Pt alert, but able to express needs.  No c/o pain.  Tolerating Lactulose.  Several BM's.  Safety maintained.  Bed in low position,  call  light in reach.

## 2023-02-10 PROBLEM — E87.20 METABOLIC ACIDOSIS: Status: ACTIVE | Noted: 2023-02-10

## 2023-02-10 PROBLEM — I47.10 SVT (SUPRAVENTRICULAR TACHYCARDIA): Status: ACTIVE | Noted: 2023-02-10

## 2023-02-10 PROBLEM — R19.7 DIARRHEA: Status: ACTIVE | Noted: 2023-02-10

## 2023-02-10 LAB
ALBUMIN SERPL BCP-MCNC: 2.4 G/DL (ref 3.5–5.2)
ALBUMIN SERPL BCP-MCNC: 2.6 G/DL (ref 3.5–5.2)
ALLENS TEST: ABNORMAL
ALP SERPL-CCNC: 100 U/L (ref 55–135)
ALT SERPL W/O P-5'-P-CCNC: 17 U/L (ref 10–44)
ANION GAP SERPL CALC-SCNC: 10 MMOL/L (ref 8–16)
ANION GAP SERPL CALC-SCNC: 5 MMOL/L (ref 8–16)
AST SERPL-CCNC: 44 U/L (ref 10–40)
BASOPHILS # BLD AUTO: 0.02 K/UL (ref 0–0.2)
BASOPHILS NFR BLD: 0.4 % (ref 0–1.9)
BILIRUB SERPL-MCNC: 3 MG/DL (ref 0.1–1)
BUN SERPL-MCNC: 3 MG/DL (ref 6–20)
BUN SERPL-MCNC: 5 MG/DL (ref 6–20)
CALCIUM SERPL-MCNC: 8 MG/DL (ref 8.7–10.5)
CALCIUM SERPL-MCNC: 9.5 MG/DL (ref 8.7–10.5)
CHLORIDE SERPL-SCNC: 111 MMOL/L (ref 95–110)
CHLORIDE SERPL-SCNC: 115 MMOL/L (ref 95–110)
CO2 SERPL-SCNC: 11 MMOL/L (ref 23–29)
CO2 SERPL-SCNC: 18 MMOL/L (ref 23–29)
CREAT SERPL-MCNC: 0.6 MG/DL (ref 0.5–1.4)
CREAT SERPL-MCNC: 0.6 MG/DL (ref 0.5–1.4)
DIFFERENTIAL METHOD: ABNORMAL
EOSINOPHIL # BLD AUTO: 0.2 K/UL (ref 0–0.5)
EOSINOPHIL NFR BLD: 3.1 % (ref 0–8)
ERYTHROCYTE [DISTWIDTH] IN BLOOD BY AUTOMATED COUNT: 16.2 % (ref 11.5–14.5)
EST. GFR  (NO RACE VARIABLE): >60 ML/MIN/1.73 M^2
EST. GFR  (NO RACE VARIABLE): >60 ML/MIN/1.73 M^2
FOLATE SERPL-MCNC: 15.1 NG/ML (ref 4–24)
GLUCOSE SERPL-MCNC: 102 MG/DL (ref 70–110)
GLUCOSE SERPL-MCNC: 118 MG/DL (ref 70–110)
HCO3 UR-SCNC: 16.6 MMOL/L (ref 24–28)
HCT VFR BLD AUTO: 33.6 % (ref 37–48.5)
HGB BLD-MCNC: 11.2 G/DL (ref 12–16)
IMM GRANULOCYTES # BLD AUTO: 0.01 K/UL (ref 0–0.04)
IMM GRANULOCYTES NFR BLD AUTO: 0.2 % (ref 0–0.5)
INR PPP: 1.6 (ref 0.8–1.2)
LYMPHOCYTES # BLD AUTO: 1.8 K/UL (ref 1–4.8)
LYMPHOCYTES NFR BLD: 34.4 % (ref 18–48)
MAGNESIUM SERPL-MCNC: 1.6 MG/DL (ref 1.6–2.6)
MCH RBC QN AUTO: 32.9 PG (ref 27–31)
MCHC RBC AUTO-ENTMCNC: 33.3 G/DL (ref 32–36)
MCV RBC AUTO: 99 FL (ref 82–98)
MONOCYTES # BLD AUTO: 0.5 K/UL (ref 0.3–1)
MONOCYTES NFR BLD: 9 % (ref 4–15)
NEUTROPHILS # BLD AUTO: 2.7 K/UL (ref 1.8–7.7)
NEUTROPHILS NFR BLD: 52.9 % (ref 38–73)
NRBC BLD-RTO: 0 /100 WBC
PCO2 BLDA: 23.6 MMHG (ref 35–45)
PH SMN: 7.46 [PH] (ref 7.35–7.45)
PHOSPHATE SERPL-MCNC: 2.1 MG/DL (ref 2.7–4.5)
PHOSPHATE SERPL-MCNC: 2.2 MG/DL (ref 2.7–4.5)
PLATELET # BLD AUTO: 93 K/UL (ref 150–450)
PMV BLD AUTO: 9.9 FL (ref 9.2–12.9)
PO2 BLDA: 117 MMHG (ref 80–100)
POC BE: -7 MMOL/L
POC SATURATED O2: 99 % (ref 95–100)
POC TCO2: 17 MMOL/L (ref 23–27)
POCT GLUCOSE: 114 MG/DL (ref 70–110)
POTASSIUM SERPL-SCNC: 3.3 MMOL/L (ref 3.5–5.1)
POTASSIUM SERPL-SCNC: 4.6 MMOL/L (ref 3.5–5.1)
PROT SERPL-MCNC: 5.5 G/DL (ref 6–8.4)
PROTHROMBIN TIME: 16.5 SEC (ref 9–12.5)
RBC # BLD AUTO: 3.4 M/UL (ref 4–5.4)
SAMPLE: ABNORMAL
SITE: ABNORMAL
SODIUM SERPL-SCNC: 134 MMOL/L (ref 136–145)
SODIUM SERPL-SCNC: 136 MMOL/L (ref 136–145)
VIT B12 SERPL-MCNC: 944 PG/ML (ref 210–950)
WBC # BLD AUTO: 5.12 K/UL (ref 3.9–12.7)

## 2023-02-10 PROCEDURE — 99233 SBSQ HOSP IP/OBS HIGH 50: CPT | Mod: ,,, | Performed by: HOSPITALIST

## 2023-02-10 PROCEDURE — 82746 ASSAY OF FOLIC ACID SERUM: CPT | Performed by: HOSPITALIST

## 2023-02-10 PROCEDURE — 25000003 PHARM REV CODE 250: Performed by: HOSPITALIST

## 2023-02-10 PROCEDURE — 93010 ELECTROCARDIOGRAM REPORT: CPT | Mod: ,,, | Performed by: INTERNAL MEDICINE

## 2023-02-10 PROCEDURE — 36600 WITHDRAWAL OF ARTERIAL BLOOD: CPT

## 2023-02-10 PROCEDURE — 63600175 PHARM REV CODE 636 W HCPCS: Performed by: HOSPITALIST

## 2023-02-10 PROCEDURE — 99900035 HC TECH TIME PER 15 MIN (STAT)

## 2023-02-10 PROCEDURE — 97530 THERAPEUTIC ACTIVITIES: CPT | Mod: CQ

## 2023-02-10 PROCEDURE — 93005 ELECTROCARDIOGRAM TRACING: CPT

## 2023-02-10 PROCEDURE — 99233 PR SUBSEQUENT HOSPITAL CARE,LEVL III: ICD-10-PCS | Mod: ,,, | Performed by: HOSPITALIST

## 2023-02-10 PROCEDURE — 97110 THERAPEUTIC EXERCISES: CPT | Mod: CQ

## 2023-02-10 PROCEDURE — 83735 ASSAY OF MAGNESIUM: CPT | Performed by: STUDENT IN AN ORGANIZED HEALTH CARE EDUCATION/TRAINING PROGRAM

## 2023-02-10 PROCEDURE — 93010 EKG 12-LEAD: ICD-10-PCS | Mod: 76,,, | Performed by: INTERNAL MEDICINE

## 2023-02-10 PROCEDURE — 12000002 HC ACUTE/MED SURGE SEMI-PRIVATE ROOM

## 2023-02-10 PROCEDURE — 80053 COMPREHEN METABOLIC PANEL: CPT | Performed by: STUDENT IN AN ORGANIZED HEALTH CARE EDUCATION/TRAINING PROGRAM

## 2023-02-10 PROCEDURE — 80069 RENAL FUNCTION PANEL: CPT | Performed by: HOSPITALIST

## 2023-02-10 PROCEDURE — 82607 VITAMIN B-12: CPT | Performed by: HOSPITALIST

## 2023-02-10 PROCEDURE — 84100 ASSAY OF PHOSPHORUS: CPT | Performed by: STUDENT IN AN ORGANIZED HEALTH CARE EDUCATION/TRAINING PROGRAM

## 2023-02-10 PROCEDURE — 85610 PROTHROMBIN TIME: CPT | Performed by: STUDENT IN AN ORGANIZED HEALTH CARE EDUCATION/TRAINING PROGRAM

## 2023-02-10 PROCEDURE — 97530 THERAPEUTIC ACTIVITIES: CPT

## 2023-02-10 PROCEDURE — 36415 COLL VENOUS BLD VENIPUNCTURE: CPT | Performed by: HOSPITALIST

## 2023-02-10 PROCEDURE — 82803 BLOOD GASES ANY COMBINATION: CPT

## 2023-02-10 PROCEDURE — 97535 SELF CARE MNGMENT TRAINING: CPT

## 2023-02-10 PROCEDURE — 25000003 PHARM REV CODE 250: Performed by: STUDENT IN AN ORGANIZED HEALTH CARE EDUCATION/TRAINING PROGRAM

## 2023-02-10 PROCEDURE — 85025 COMPLETE CBC W/AUTO DIFF WBC: CPT | Performed by: STUDENT IN AN ORGANIZED HEALTH CARE EDUCATION/TRAINING PROGRAM

## 2023-02-10 RX ORDER — POTASSIUM CHLORIDE 20 MEQ/1
40 TABLET, EXTENDED RELEASE ORAL ONCE
Status: COMPLETED | OUTPATIENT
Start: 2023-02-10 | End: 2023-02-10

## 2023-02-10 RX ORDER — TRIPROLIDINE/PSEUDOEPHEDRINE 2.5MG-60MG
200 TABLET ORAL EVERY 6 HOURS PRN
Status: CANCELLED | OUTPATIENT
Start: 2023-02-10

## 2023-02-10 RX ORDER — SODIUM BICARBONATE 650 MG/1
1300 TABLET ORAL 3 TIMES DAILY
Status: DISCONTINUED | OUTPATIENT
Start: 2023-02-11 | End: 2023-02-11

## 2023-02-10 RX ORDER — AMOXICILLIN 875 MG/1
875 TABLET, FILM COATED ORAL EVERY 12 HOURS
Status: DISCONTINUED | OUTPATIENT
Start: 2023-02-10 | End: 2023-02-13

## 2023-02-10 RX ORDER — ALBUTEROL SULFATE 2.5 MG/.5ML
2.5 SOLUTION RESPIRATORY (INHALATION) EVERY 4 HOURS PRN
Status: CANCELLED | OUTPATIENT
Start: 2023-02-10

## 2023-02-10 RX ORDER — SODIUM,POTASSIUM PHOSPHATES 280-250MG
2 POWDER IN PACKET (EA) ORAL
Status: COMPLETED | OUTPATIENT
Start: 2023-02-10 | End: 2023-02-10

## 2023-02-10 RX ORDER — MAGNESIUM SULFATE HEPTAHYDRATE 40 MG/ML
2 INJECTION, SOLUTION INTRAVENOUS ONCE
Status: COMPLETED | OUTPATIENT
Start: 2023-02-10 | End: 2023-02-10

## 2023-02-10 RX ADMIN — ARIPIPRAZOLE 10 MG: 5 TABLET ORAL at 09:02

## 2023-02-10 RX ADMIN — AMOXICILLIN 875 MG: 875 TABLET, COATED ORAL at 09:02

## 2023-02-10 RX ADMIN — HYDROXYZINE HYDROCHLORIDE 50 MG: 25 TABLET, FILM COATED ORAL at 09:02

## 2023-02-10 RX ADMIN — LITHIUM CARBONATE 300 MG: 300 TABLET, FILM COATED, EXTENDED RELEASE ORAL at 09:02

## 2023-02-10 RX ADMIN — FOLIC ACID 1 MG: 1 TABLET ORAL at 09:02

## 2023-02-10 RX ADMIN — THERA TABS 1 TABLET: TAB at 09:02

## 2023-02-10 RX ADMIN — LACTULOSE 30 G: 20 SOLUTION ORAL at 02:02

## 2023-02-10 RX ADMIN — CEFTRIAXONE 1 G: 1 INJECTION, POWDER, FOR SOLUTION INTRAMUSCULAR; INTRAVENOUS at 09:02

## 2023-02-10 RX ADMIN — AMOXICILLIN 875 MG: 875 TABLET, COATED ORAL at 03:02

## 2023-02-10 RX ADMIN — POTASSIUM CHLORIDE 40 MEQ: 1500 TABLET, EXTENDED RELEASE ORAL at 06:02

## 2023-02-10 RX ADMIN — MAGNESIUM SULFATE 2 G: 2 INJECTION INTRAVENOUS at 06:02

## 2023-02-10 RX ADMIN — POTASSIUM & SODIUM PHOSPHATES POWDER PACK 280-160-250 MG 2 PACKET: 280-160-250 PACK at 04:02

## 2023-02-10 RX ADMIN — MAGNESIUM SULFATE 2 G: 2 INJECTION INTRAVENOUS at 10:02

## 2023-02-10 RX ADMIN — POTASSIUM CHLORIDE 40 MEQ: 1500 TABLET, EXTENDED RELEASE ORAL at 10:02

## 2023-02-10 RX ADMIN — LEVETIRACETAM 1000 MG: 500 TABLET, FILM COATED ORAL at 09:02

## 2023-02-10 RX ADMIN — POTASSIUM & SODIUM PHOSPHATES POWDER PACK 280-160-250 MG 2 PACKET: 280-160-250 PACK at 10:02

## 2023-02-10 RX ADMIN — PANTOPRAZOLE SODIUM 40 MG: 40 TABLET, DELAYED RELEASE ORAL at 09:02

## 2023-02-10 RX ADMIN — POTASSIUM & SODIUM PHOSPHATES POWDER PACK 280-160-250 MG 2 PACKET: 280-160-250 PACK at 09:02

## 2023-02-10 RX ADMIN — SODIUM CHLORIDE 1000 ML: 9 INJECTION, SOLUTION INTRAVENOUS at 05:02

## 2023-02-10 RX ADMIN — SODIUM BICARBONATE: 84 INJECTION, SOLUTION INTRAVENOUS at 10:02

## 2023-02-10 RX ADMIN — THIAMINE HCL TAB 100 MG 100 MG: 100 TAB at 09:02

## 2023-02-10 NOTE — ASSESSMENT & PLAN NOTE
2/10 9 episodes of BMs yesterday. lactulose held. severe metabolic acidosis likely from diarrhea.. patient with bicarbonate   Recent Labs   Lab 02/12/23  0241 02/13/23  0338 02/14/23  0457   CO2 20* 21* 20*    .started on bicarbonate drip. monitor   2/11 off bicarbonate drip. continue sodium bicarbonate

## 2023-02-10 NOTE — ASSESSMENT & PLAN NOTE
2/19  SVT 160s overnight ,asymptomatic improved with  1L NS bolus initiated and carotid massage . converted to NSR on EKG.

## 2023-02-10 NOTE — ASSESSMENT & PLAN NOTE
2/9  UA + UC pending. stared on IV ceftriaxone .   2/10 . UC - GRAM NEGATIVE BILLY >100,000 cfu/ml Identification and susceptibility pending .ENTEROCOCCUS SPECIES > 100,000 cfu/ml dentification and susceptibility pending. started on amoxicillin  2/12 E coli sensitive to ceftriaxone and Enterococcus sensitive to amoxicillin

## 2023-02-10 NOTE — ASSESSMENT & PLAN NOTE
2/10. 9 episodes of BMs yesterday. lactulose held.   2/11 resolved.  resumed lactulose at  10g TID

## 2023-02-10 NOTE — PLAN OF CARE
Problem: Fluid Volume Excess  Goal: Fluid Balance  Outcome: Ongoing, Progressing     Problem: Urinary Retention  Goal: Effective Urinary Elimination  Outcome: Ongoing, Progressing   Pt able to express needs.  NO c/o pain.  AT 4:43 this morning, Heart rate 160-170 sustaining.  Charge nurse and Rapid Response in room.  EKG , Blood Gases, & Bolus given.  No IVP from code cart.  Heart rate returned to normal after sternal rub/ carotid massage per Dr. Rivera.   IV Magnesium and PO Potassium given as ordered. Patient stable by 7am.  Reported to day shift.  Safety maintained.  Bed in low position,  call  light in reach.

## 2023-02-10 NOTE — PROGRESS NOTES
Removed indwelling baugh per MD orders. 200cc clear yellow emptied and urine recorded in I&O.  Placed purewick. Patient returned to bed from recliner.  Bed in lowest/locked position, bed alarm on and call bell within reach.  Nabicarb running to piv @ 75cc/hr.  Renal function panel drawn by lab.  Yousuf.

## 2023-02-10 NOTE — PT/OT/SLP PROGRESS
"Occupational Therapy   Treatment    Name: Marely Hamilton  MRN: [id number]  Admitting Diagnosis:  Hepatic encephalopathy       Recommendations:     Discharge Recommendations: nursing facility, skilled  Discharge Equipment Recommendations:  none  Barriers to discharge:  Decreased caregiver support    Assessment:     Marely Hamilton is a 57 y.o. female with a medical diagnosis of Hepatic encephalopathy.  She presents with lethargy, but agreeable for therapy. Pt was full code for rapid response at 5am and did not receive much rest. Pt HR remained "normal" 84 bpm and 99% O2 saturation during light activity. Pt tolerated sitting EOB for 10 minutes with mild light headedness. Once resolved pt donned B socks without (A). Pt performed STS with CGA for stability / safety. Pt ambulated to chair using RW without changes in vitals. Performance deficits affecting function are weakness, impaired sensation, impaired endurance, impaired self care skills, impaired functional mobility, impaired balance, gait instability, impaired cardiopulmonary response to activity.     Rehab Prognosis:  Good; patient would benefit from acute skilled OT services to address these deficits and reach maximum level of function.       Plan:     Patient to be seen 3 x/week to address the above listed problems via self-care/home management, neuromuscular re-education, therapeutic activities, therapeutic exercises  Plan of Care Expires: 03/11/23  Plan of Care Reviewed with: patient    Subjective     Pain/Comfort:  Pain Rating 1: 0/10    Objective:     Communicated with: nurse prior to session.  Patient found HOB elevated with bed alarm, peripheral IV, baugh catheter upon OT entry to room.    General Precautions: Standard, fall    Orthopedic Precautions:N/A  Braces: N/A  Respiratory Status: Room air     Occupational Performance:     Bed Mobility:    Patient completed Rolling/Turning to Left with  contact guard assistance  Patient completed Rolling/Turning to " Right with contact guard assistance  Patient completed Supine to Sit with contact guard assistance     Functional Mobility/Transfers:  Patient completed Sit <> Stand Transfer with contact guard assistance  with  rolling walker   Patient completed Bed <> Chair Transfer using Stand Pivot technique with contact guard assistance with rolling walker  Functional Mobility: Pt ambulated to chair with unsteady gait    Activities of Daily Living:  Lower Body Dressing: contact guard assistance Don B socks at EOB      [facility] 6 Click ADL: 20    Treatment & Education:  Pt educated on role of OT/POC  Pt. Educated on safety precautions   Pt provided self-care/ADL re-education  Pt reviewed bed mobility/functional mobility    Patient left up in chair with all lines intact, call button in reach, and nurse notified    GOALS:   Multidisciplinary Problems       Occupational Therapy Goals          Problem: Occupational Therapy    Goal Priority Disciplines Outcome Interventions   Occupational Therapy Goal     OT, PT/OT Ongoing, Progressing    Description: Goals to be met by: 2/23/2023    Patient will increase functional independence with ADLs by performing:    Feeding with [place].  UE Dressing with [place].  LE Dressing with [place].  Grooming while standing at sink with Modified [place] using AD PRN.  Toileting from toilet with [place] for hygiene and clothing management.   Toilet transfer to toilet with Modified [place] using AD PRN.  Step transfer with Modified [place] using AD PRN.                         Time Tracking:     OT Date of Treatment: 02/10/23  OT Start Time: 0940  OT Stop Time: 1004  OT Total Time (min): 24 min    Billable Minutes:Self Care/Home Management 24    OT/DC: OT     DC Visit Number: 0    2/10/2023

## 2023-02-10 NOTE — CARE UPDATE
RAPID RESPONSE NURSE NOTE        Admit Date: 2023  LOS: 3  Code Status: Full Code   Date of Consult: 02/10/2023  : 1965  Age: 57 y.o.  Weight:   Wt Readings from Last 1 Encounters:   23 58.5 kg (129 lb)     Sex: female  Race: White   Bed: 37978/45092 A:   MRN: [id number]  Time Rapid Response Team page Received: 0505  Time Rapid Response Team at Bedside: 0511  Time Rapid Response Team left Bedside: 0532  Was the patient discharged from an ICU this admission? No   Was the patient discharged from a PACU within last 24 hours? No   Did the patient receive conscious sedation/general anesthesia in last 24 hours? No  Was the patient in the ED within the past 24 hours? No  Was the patient on NIPPV within the past 24 hours? No   Did this progress into an ARC or CPA: no  Attending Physician: Seth Noyola MD  Primary Service: [facility] MED N       SITUATION    Notified by charge RN via phone call.  Reason for alert: SVT  Called to evaluate the patient for Dysrythmia    BACKGROUND     Why is the patient in the hospital?: Hepatic encephalopathy    Patient has a past medical history of Addiction to drug, Alcohol abuse, Alcohol abuse, in remission, Anemia, Anxiety, Behavioral problem, Bipolar disorder, Bipolar disorder in remission, Cirrhosis, Laennec's, Depression, Encounter for blood transfusion, Epistaxis, Fatigue, Glaucoma, Hematuria, Hepatic encephalopathy, Hepatic enlargement, History of psychiatric care, History of psychiatric hospitalization, History of seizure, hx of intentional Tylenol overdose, psychiatric care, Macrocytic anemia, Jeana, Osteoarthritis, Other ascites, Psychiatric exam requested by authority, Psychiatric problem, Psychosis, Renal disorder, Seizures, Self-harming behavior, Suicide attempt, Therapy, and Thrombocytopenia.    Last Vitals:  Temp: 96.4 °F (35.8 °C) (02/10 0443)  Pulse: 160 (02/10 0443)  Resp: 18 (02/10 0443)  BP: 104/60 (02/10 0443)  SpO2: 96 % (02/10 0443)    24 Hours Vitals  Range:  Temp:  [96.4 °F (35.8 °C)-98.9 °F (37.2 °C)]   Pulse:  []   Resp:  [18]   BP: (104-131)/(58-67)   SpO2:  [93 %-96 %]     Labs:  Recent Labs     02/08/23  0338 02/09/23  0223 02/10/23  0436   WBC 4.71 4.44 5.12   HGB 12.1 11.4* 11.2*   HCT 36.3* 34.1* 33.6*   PLT 89* 95* 93*       Recent Labs     02/07/23  1356 02/08/23  0338 02/09/23  0222    141 138   K 4.3 3.7 3.1*   * 116* 116*   CO2 14* 16* 13*   CREATININE 0.9 0.7 0.8   * 98 96   PHOS  --  2.9 2.5*   MG  --  1.8 1.7        No results for input(s): PH, PCO2, PO2, HCO3, POCSATURATED, BE in the last 72 hours.     ASSESSMENT    Physical Exam  Vitals and nursing note reviewed.   Constitutional:       General: She is not in acute distress.     Appearance: She is ill-appearing.   Cardiovascular:      Rate and Rhythm: Tachycardia present.   Pulmonary:      Effort: Pulmonary effort is normal.   Abdominal:      General: Abdomen is flat.      Palpations: Abdomen is soft.   Skin:     General: Skin is warm.   Neurological:      Mental Status: She is oriented to person, place, and time. She is lethargic.     Called to assess patient for HR sustaining 160 on telemetry. Patient in no acute distress, BP stable. 12-Lead EKG shows SVT. Attempted valsalva maneuvers with no success. Dr. Rivera to bedside. 1L NS bolus initiated, carotid massage performed by MD. Patient converted to NSR, confirmed with repeat 12-lead EKG. Morning labs already collected and pending (CBC, CMP, Mag, Phos, PT/INR).     RRN IV START       IV started during emergency event. 20g placed to right forearm.   Please call Rapid Response RN, Cameron Junior RN with any questions or concerns at [phone].      INTERVENTIONS    The patient was seen for Cardiac problem. Staff concerns included tachycardia. The following interventions were performed: CBC, CMP, Magnesium, EKG, continuous cardiac monitoring continued, continuous pulse ox monitoring continued, normal saline 1000 ml IV  bolus , and carotid massage per MD.    RECOMMENDATIONS    We recommend: admin NS bolus, follow up with labs and replete lytes as necessary. Consider MIVF as patient is having multiple BMs with lactulose administration. Consider cardiology consult if dysrhythmia occurs again.     PROVIDER ESCALATION    Orders received and case discussed with Dr. Rivera .    Primary team arrival time: 0516    Disposition: Remain in room 11791.    FOLLOW UP    Bedside RNAngella and Charge RN Pancho  updated on plan of care. Instructed to call the Rapid Response Nurse, Cameron Junior RN at [phone] for additional questions or concerns.

## 2023-02-10 NOTE — NURSING
At 0400 vitals, pt HR noted to be in 160s sustaining. Stat EKG obtained showing SVT. MD and RR notified, both to bedside. Carotid massage performed by Dr. Rivera, with a positive response. HR returned to low 90s, stat EKG obtained showing Normal sinus. 1L NS bolus, telemetry monitoring, and an ABG ordered. Pt remains on this floor at this time. Monitoring.

## 2023-02-10 NOTE — PLAN OF CARE
[facility] - Intensive Care ([facility])  Discharge Reassessment    Primary Care Provider: Viktor Ross MD    Expected Discharge Date: 2/13/2023    Reassessment (most recent)       Discharge Reassessment - 02/10/23 1120          Discharge Reassessment    Assessment Type Discharge Planning Reassessment     Did the patient's condition or plan change since previous assessment? No     Discharge Plan A Skilled Nursing Facility     Discharge Plan B Home Health     DME Needed Upon Discharge  --   TBD    Discharge Barriers Identified None     Why the patient remains in the hospital Requires continued medical care        Post-Acute Status    Post-Acute Authorization Placement;Home Health     Post-Acute Placement Status Pending post-acute provider review/more information requested   SNFs are reviewing the referral    Home Health Status Pending medical clearance/testing   [facility] - patient is current    Discharge Delays None known at this time                 Completed LOCET. Will need attending MD's signature on the PASRR prior to faxing to the Office of Aging along with supporting clinical documentation. Updated MD.    SNFs currently reviewing/sent in careport:  [facility]  [facility]  [facility]    SNFs that have denied:  [facility]- no beds available  [facility]- care needs exceed current capacity      12:00 PM  Faxed MD signed PASRR along with supporting clinical documentation to the Office of Aging to obtain form 142 needed for SNF placement.    1:55 PM  Form 142/PASRR has been uploaded to careport.    Updated clinicals were sent to SNFs in care\[facility]\"".    Mirela Cintron LCSW  [facility]  Ext. [phone]

## 2023-02-10 NOTE — PLAN OF CARE
Pt current with [facility], [facility], and [facility].  Pt may make decision to go home and resume services instead of SNF services.      Pt's sister, Zaria, interested in SNF short term; [facility] first choice.  SW sent to facilities in network with pt's insurance.  Referrals sent to the following facilities:  [facility], [facility]. [facility], [facility], [facility], and [facility].       02/09/23 5072   Post-Acute Status   Post-Acute Authorization Placement;Home Health   Post-Acute Placement Status Referrals Sent   Home Health Status Referrals Sent   Coverage [employer]   Discharge Delays None known at this time   Discharge Plan   Discharge Plan A Skilled Nursing Facility   Discharge Plan B Home Health;Home;Home with family;Other  ([facility], [facility], and [facility])       Emily De La Rosa LMSW  PRN-  [facility]  Ext. [phone]

## 2023-02-10 NOTE — PLAN OF CARE
SW spoke to pt's sister, Zaria [phone] to discuss recommendations for SNF.  Pt's sister stated pt nor pt's family interested in nursing home placement and does not want pt to go live anywhere.  SW provided education on difference between Nursing Home Placement and SNF.  Pt's sister stated pt may agree to SNF placement.  Pt's sister stated once pt stabilizes she should be able to make decision on if she wants SNF vs continue HH w/ sitter services.  Pt's sister stated if they decided to go with SNF -1st preference [facility].  SW advised will send referrals to facilities in network with [facility].   Pt's sister requested a return call from provider.  SW notified MD, pt's sister would like a return call.     Emily De La Rosa LMSW  PRN-  [facility]  Ext. [phone]

## 2023-02-10 NOTE — PROGRESS NOTES
[facility] - [facility] ([address] Medicine  Progress Note    Patient Name: Marely Hamilton  MRN: [id number]  Patient Class: IP- Inpatient   Admission Date: 2/7/2023  Length of Stay: 3 days  Attending Physician: Seth Noyola MD  Primary Care Provider: Viktor Ross MD        Subjective:     Principal Problem:Hepatic encephalopathy        HPI:  Ms. Hamilton is a 57 yoF with a h/o alcoholic cirrhosis, hepatic encephalopathy, seizure, bipolar disorder who present with altered mental status.  History taken from chart review given patient only able to state that she is confused.  Per report, EMS was called by patient's sister given concern for worsening mental status.  Sister also states patient has become less able take care of herself and manage her medications.  Believes patient has not taking her lactulose in over a week.    In the ED, VSS.  Labs notable for creatinine 0.4, hemoglobin 12.5, bilirubin 3.1, platelets 59, INR 1.6, ammonia 131.  CT head unremarkable.          Overview/Hospital Course:  2/9 K and P replaced. UA + UC pending. stared on IV ceftriaxone . CX ray -No significant intrathoracic abnormality.and BCx 2 . sono abdomen with no ascitis  2/10  SVT 160s overnight ,asymptomatic improved with  1L NS bolus initiated and carotid massage . converted to NSR on EKG. K, MG and P replaced. 9 episodes of BMs yesterday. lactulose held. severe metabolic acidosis likely from diarrhea. started on sodium bicarbonate drip . UC - GRAM NEGATIVE BILLY >100,000 cfu/ml Identification and susceptibility pending .ENTEROCOCCUS SPECIES > 100,000 cfu/ml dentification and susceptibility pending. started on amoxicillin          Review of Systems:   Pain scale:   Unable to perform ROS: Mental status change   Constitutional:  fever,  chills, headache, vision loss, hearing loss, weight loss, Generalized weakness, falls, loss of smell, loss of taste, poor appetite,  sore throat, epistaxis- resolved  Respiratory:  cough, shortness of breath.   Cardiovascular: chest pain, dizziness, palpitations, orthopnea, swelling of feet, syncope  Gastrointestinal: nausea, vomiting, abdominal pain- suprapubic -improved , diarrhea -improved , black stool,  blood in stool, change in bowel habits  Genitourinary: hematuria, dysuria, urgency, frequency  Integument/Breast: rash,  pruritis  Hematologic/Lymphatic: easy bruising, lymphadenopathy  Musculoskeletal: arthralgias , myalgias, back pain, neck pain, knee pain  Neurological: confusion, seizures, tremors, slurred speech  Behavioral/Psych:  depression, anxiety, auditory or visual hallucinations     OBJECTIVE:     Physical Exam:  Body mass index is 22.85 kg/m².    Constitutional: Appears well-developed and well-nourished.   Head: Normocephalic and atraumatic.   Neck: Normal range of motion. Neck supple.   Cardiovascular: Normal heart rate.  Regular heart rhythm.  Pulmonary/Chest: Effort normal.   Abdominal: No distension.  + suprapubic  tenderness  Musculoskeletal: Normal range of motion. No edema.   Neurological: Alert and oriented to person, place, month  follows simple commands  Skin: Skin is warm and dry.   Psychiatric: Normal mood and affect. Behavior is normal.                  Vital Signs  Temp: 98.6 °F (37 °C) (02/10/23 1115)  Pulse: 79 (02/10/23 1149)  Resp: 14 (02/10/23 1115)  BP: (Abnormal) 100/55 (02/10/23 1115)  SpO2: 98 % (02/10/23 1149)       24 Hour VS Range    Temp:  [96.4 °F (35.8 °C)-98.9 °F (37.2 °C)]   Pulse:  []   Resp:  [14-25]   BP: (100-131)/(55-67)   SpO2:  [93 %-98 %]     Intake/Output Summary (Last 24 hours) at 2/10/2023 1515  Last data filed at 2/10/2023 1300  Gross per 24 hour   Intake 369.25 ml   Output 700 ml   Net -330.75 ml         I/O This Shift:  No intake/output data recorded.    Wt Readings from Last 3 Encounters:   02/09/23 58.5 kg (129 lb)   01/27/23 58.9 kg (129 lb 13.6 oz)   11/02/21 58.9 kg (129 lb 13.6 oz)       I have personally reviewed the  vitals and recorded Intake/Output     Laboratory/Diagnostic Data:    CBC/Anemia Labs: Coags:    Recent Labs   Lab 02/08/23  0338 02/09/23  0223 02/10/23  0436   WBC 4.71 4.44 5.12   HGB 12.1 11.4* 11.2*   HCT 36.3* 34.1* 33.6*   PLT 89* 95* 93*   * 100* 99*   RDW 16.6* 16.1* 16.2*   FOLATE  --   --  15.1   [id number]  --   --  944    Recent Labs   Lab 02/07/23  1424 02/08/23  0338 02/09/23  0223 02/10/23  0436   INR 1.6* 1.6* 1.6* 1.6*   APTT 32.3*  --   --   --         Chemistries: ABG:   Recent Labs   Lab 02/08/23  0338 02/09/23  0222 02/10/23  0437    138 136   K 3.7 3.1* 3.3*   * 116* 115*   CO2 16* 13* 11*   BUN 13 9 5*   CREATININE 0.7 0.8 0.6   CALCIUM 9.7 9.2 9.5   PROT 5.9* 5.6* 5.5*   BILITOT 3.7* 3.0* 3.0*   ALKPHOS 76 86 100   ALT 15 15 17   AST 34 35 44*   MG 1.8 1.7 1.6   PHOS 2.9 2.5* 2.1*    Recent Labs   Lab 02/10/23  0537   PH 7.456*   PCO2 23.6*   PO2 117*   HCO3 16.6*   POCSATURATED 99   BE -7        POCT Glucose: HbA1c:    Recent Labs   Lab 02/10/23  0504   POCTGLUCOSE 114*    Hemoglobin A1C   Date Value Ref Range Status   01/22/2021 4.1 4.0 - 5.6 % Final     Comment:     ADA Screening Guidelines:  5.7-6.4%  Consistent with prediabetes  >or=6.5%  Consistent with diabetes  High levels of fetal hemoglobin interfere with the HbA1C  assay. Heterozygous hemoglobin variants (HbS, HgC, etc)do  not significantly interfere with this assay.   However, presence of multiple variants may affect accuracy.     12/10/2020 4.6 4.0 - 5.6 % Final     Comment:     ADA Screening Guidelines:  5.7-6.4%  Consistent with prediabetes  >or=6.5%  Consistent with diabetes  High levels of fetal hemoglobin interfere with the HbA1C  assay. Heterozygous hemoglobin variants (HbS, HgC, etc)do  not significantly interfere with this assay.   However, presence of multiple variants may affect accuracy.     05/16/2020 4.1 4.0 - 5.6 % Final     Comment:     ADA Screening Guidelines:  5.7-6.4%  Consistent with  prediabetes  >or=6.5%  Consistent with diabetes  High levels of fetal hemoglobin interfere with the HbA1C  assay. Heterozygous hemoglobin variants (HbS, HgC, etc)do  not significantly interfere with this assay.   However, presence of multiple variants may affect accuracy.          Cardiac Enzymes: Ejection Fractions:    No results for input(s): CPK, CPKMB, MB, TROPONINI in the last 72 hours. No results found for: EF       No results for input(s): COLORU, APPEARANCEUA, PHUR, SPECGRAV, PROTEINUA, GLUCUA, KETONESU, BILIRUBINUA, OCCULTUA, NITRITE, UROBILINOGEN, LEUKOCYTESUR, RBCUA, WBCUA, BACTERIA, SQUAMEPITHEL, HYALINECASTS in the last 48 hours.    Invalid input(s): WRIGHTSUR      Lactate (Lactic Acid) (mmol/L)   Date Value   11/02/2021 1.8   01/11/2021 2.0   01/15/2017 2.3 (H)     No results found for: BNP  No results found for: CRP, SEDRATE  No results found for: DDIMER  Ferritin (ng/mL)   Date Value   10/23/2015 412 (H)   09/19/2015 599 (H)   09/19/2015 599 (H)   07/23/2015 551 (H)   06/17/2015 612 (H)     LD (U/L)   Date Value   09/19/2015 280 (H)     Troponin I (ng/mL)   Date Value   01/25/2023 <0.006     CPK (U/L)   Date Value   01/25/2023 27   06/14/2020 23 (L)     No results found for this or any previous visit.  SARS-CoV-2 RNA, Amplification, Qual (no units)   Date Value   11/02/2021 Negative   01/21/2021 Negative   01/11/2021 Negative   11/21/2020 Negative   06/16/2020 Negative       Microbiology labs for the last week  Microbiology Results (last 7 days)       Procedure Component Value Units Date/Time    Blood culture [[id number]] Collected: 02/09/23 0847    Order Status: Completed Specimen: Blood Updated: 02/10/23 1012     Blood Culture, Routine No Growth to date      No Growth to date    Blood culture [[id number]] Collected: 02/09/23 0847    Order Status: Completed Specimen: Blood Updated: 02/10/23 1012     Blood Culture, Routine No Growth to date      No Growth to date    Urine culture [[id number]]   "(Abnormal) Collected: 02/08/23 1230    Order Status: Completed Specimen: Urine Updated: 02/09/23 2024     Urine Culture, Routine GRAM NEGATIVE BILLY  >100,000 cfu/ml  Identification and susceptibility pending        ENTEROCOCCUS SPECIES  > 100,000 cfu/ml  Identification and susceptibility pending      Narrative:      Specimen Source->Urine            Reviewed and noted in plan where applicable- Please see chart for full lab data.    Lines/Drains:       Peripheral IV - Single Lumen 02/07/23 1425 20 G Anterior;Right Upper Arm (Active)   Site Assessment Clean;Dry;Intact 02/09/23 0800   Extremity Assessment Distal to IV No swelling;No redness;No abnormal discoloration 02/09/23 0800   Line Status Saline locked 02/09/23 0800   Dressing Status Clean;Dry;Intact 02/09/23 0800   Dressing Intervention Integrity maintained 02/09/23 0800   Dressing Change Due 02/11/23 02/09/23 0800   Site Change Due 02/11/23 02/09/23 0800   Reason Not Rotated Not due 02/09/23 0800   Number of days: 2            Urethral Catheter 02/08/23 1240 16 Fr. (Active)   $ Saldaña Insertion Bedside Insertion Performed 02/09/23 1305   Site Assessment Clean;Intact 02/09/23 0800   Collection Container Urimeter 02/09/23 0800   Securement Method secured to top of thigh w/ adhesive device 02/09/23 0800   Catheter Care Performed yes 02/09/23 0800   Reason for Continuing Urinary Catheterization Urinary retention 02/09/23 0800   CAUTI Prevention Bundle Securement Device in place with 1" slack 02/09/23 0800   Output (mL) 200 mL 02/09/23 0800   Number of days: 1       Imaging  ECG Results              EKG 12-lead (Final result)  Result time 02/07/23 15:57:30      Final result by Interface, Lab In [facility] (02/07/23 15:57:30)               Narrative:    Test Reason : R41.82,    Vent. Rate : 056 BPM     Atrial Rate : 056 BPM     P-R Int : 152 ms          QRS Dur : 082 ms      QT Int : 438 ms       P-R-T Axes : 077 036 087 degrees     QTc Int : 422 ms    Artifact  Sinus " bradycardia  Nonspecific T wave abnormality  Abnormal ECG  When compared with ECG of 25-JAN-2023 11:13,  Poor data quality in current ECG precludes serial comparison    Confirmed by Ayush Hay MD ([id number]) on 2/7/2023 3:57:21 PM    Referred By: GLORIA   SELF           Confirmed By:Ayush Hay MD                                  Results for orders placed during the hospital encounter of 11/21/20    Echo Color Flow Doppler? Yes    Interpretation Summary  · The left ventricle is normal in size with normal systolic function. The estimated ejection fraction is 55%.  · There is left ventricular concentric remodeling.  · Normal left ventricular diastolic function.  · Normal right ventricular size with normal right ventricular systolic function.  · Normal central venous pressure (3 mmHg).      X-Ray Chest 1 View  Narrative: EXAMINATION:  XR CHEST 1 VIEW    CLINICAL HISTORY:  r/o pneumonia;    TECHNIQUE:  One view    COMPARISON:  Comparison is made to 01/25/2023.    FINDINGS:  Heart size is normal, as is the appearance of the pulmonary vascularity.  Lung zones are clear, and are free of significant airspace consolidation or volume loss.  No pleural fluid.  No hilar or mediastinal mass lesion.  No pneumothorax.  Impression: No significant intrathoracic abnormality.  No significant detrimental interval change in the appearance of the chest since 01/25/2023 is appreciated.    Electronically signed by: Americo Reyes MD  Date:    02/09/2023  Time:    11:12  US Abdomen Limited  Narrative: EXAMINATION:  US ABDOMEN LIMITED    CLINICAL HISTORY:  r/o ascitis;    TECHNIQUE:  Limited ultrasound evaluation of the abdomen was performed to evaluate for ascites.    COMPARISON:  Abdominal ultrasound 01/25/2023.  CT abdomen pelvis 01/21/2021.    FINDINGS:  Limited sonographic evaluation of the all 4 abdominal quadrants demonstrates no significant ascites.    Redemonstrated dilated varices within the right lower quadrant.  Impression: No  significant ascites.    Electronically signed by resident: Wilian Daily  Date:    02/09/2023  Time:    10:15    Electronically signed by: Eriberto Holliday  Date:    02/09/2023  Time:    10:49      Labs, Imaging, EKG and Diagnostic results from 2/10/2023 were reviewed.    Medications:  Medication list was reviewed and changes noted under Assessment/Plan.  No current facility-administered medications on file prior to encounter.     Current Outpatient Medications on File Prior to Encounter   Medication Sig Dispense Refill    ARIPiprazole (ABILIFY) 10 MG Tab Take 10 mg by mouth nightly.      furosemide (LASIX) 20 MG tablet Take 0.5 tablets (10 mg total) by mouth 2 (two) times daily. 30 tablet 1    hydrOXYzine (ATARAX) 50 MG tablet Take 50 mg by mouth every evening.      lactulose (CHRONULAC) 10 gram/15 mL solution Take 45 mLs (30 g total) by mouth 3 (three) times daily. 3784 mL 3    levETIRAcetam (KEPPRA) 500 MG Tab Take 1,000 mg by mouth 2 (two) times daily.      lithium (LITHOTAB) 300 mg tablet Take 300 mg by mouth nightly.      mometasone (ELOCON) 0.1 % ointment Apply topically.      multivitamin Tab Take 1 tablet by mouth once daily.      pantoprazole (PROTONIX) 40 MG tablet Take 40 mg by mouth once daily.      propranoloL (INDERAL) 20 MG tablet Take 20 mg by mouth once daily.      QUEtiapine (SEROQUEL) 100 MG Tab Take 100 mg by mouth every evening.      spironolactone (ALDACTONE) 25 MG tablet Take 25 mg by mouth once daily.       Scheduled Medications:  amoxicillin, 875 mg, Oral, Q12H  ARIPiprazole, 10 mg, Oral, Nightly  cefTRIAXone (ROCEPHIN) IVPB, 1 g, Intravenous, Q24H  folic acid, 1 mg, Oral, Daily  hydrOXYzine, 50 mg, Oral, QHS  levETIRAcetam, 1,000 mg, Oral, BID  lithium, 300 mg, Oral, QHS  multivitamin, 1 tablet, Oral, Daily  pantoprazole, 40 mg, Oral, Daily  potassium, sodium phosphates, 2 packet, Oral, QID (AC & HS)  [START ON 2/11/2023] sodium bicarbonate, 1,300 mg, Oral, TID  thiamine, 100 mg, Oral,  Daily      PRN: acetaminophen, melatonin, naloxone, ondansetron, polyethylene glycol, prochlorperazine, sodium chloride 0.9%  Infusions:    sodium bicarbonate drip 75 mL/hr at 02/10/23 1017     Estimated Creatinine Clearance: 86.6 mL/min (based on SCr of 0.6 mg/dL).     Assessment/Plan:      * Hepatic encephalopathy  See above  Patient with hepatic enephalopathy  Recent Labs   Lab 02/07/23  1356   AMMONIA 131*     continue lactulose TID with goal of 3-4 bowel movements daily.  2/9 Alert and oriented to person, place. follows simple commands    Metabolic acidosis   2/109 episodes of BMs yesterday. lactulose held. severe metabolic acidosis likely from diarrhea. \  likely from CKD/AYESHA . patient with bicarbonate Recent Labs   Lab 02/08/23  0338 02/09/23  0222 02/10/23  0437   CO2 16* 13* 11*    .started on bicarbonate drip. monitor       Diarrhea  2/10. 9 episodes of BMs yesterday. lactulose held.     SVT (supraventricular tachycardia)  2/19  SVT 160s overnight ,asymptomatic improved with  1L NS bolus initiated and carotid massage . converted to NSR on EKG. K, MG and P replaced.     Elevated INR  patient with INR Recent Labs   Lab 02/07/23  1424 02/08/23  0338 02/09/23  0223   INR 1.6* 1.6* 1.6*   . INR is supratherapeutic. secondary to coagulopathy from liver disease.monitor      Hypophosphatemia  replaced      Hypokalemia    replaced     UTI (urinary tract infection)  2/9  UA + UC pending. stared on IV ceftriaxone .   2/10 . UC - GRAM NEGATIVE BILLY >100,000 cfu/ml Identification and susceptibility pending .ENTEROCOCCUS SPECIES > 100,000 cfu/ml dentification and susceptibility pending. started on amoxicillin      Bipolar 1 disorder, depressed, severe  - Continue home lithium  - Continue home Abilify  - Holding home Seroquel    Malnutrition  Nutrition consulted      Anemia    Patient's with macrocytic anemia.. Hemoglobin stable. Etiology likely due to chronic disease . B12 and folate levels   Current CBC reviewed-   Recent Labs   Lab 02/07/23  1356 02/08/23  0338 02/09/23  0223   HGB 12.5 12.1 11.4*     Monitor CBC and transfuse if H/H drops below 7/21.       History of seizure  Continue home Keppra      Thrombocytopenia  Secondary to cirrhosis   Trend CBC  2/9     Patient with thrombocytopenia Recent Labs   Lab 02/07/23  1356 02/08/23  0338 02/09/23  0223   PLT 59* 89* 95*   . Platelet counts stable.monitor    Cirrhosis, Laennec's  MELD-Na score: 16 at 2/9/2023  2:23 AM  MELD score: 16 at 2/9/2023  2:23 AM  Calculated from:  Serum Creatinine: 0.8 mg/dL (Using min of 1 mg/dL) at 2/9/2023  2:22 AM  Serum Sodium: 138 mmol/L (Using max of 137 mmol/L) at 2/9/2023  2:22 AM  Total Bilirubin: 3.0 mg/dL at 2/9/2023  2:22 AM  INR(ratio): 1.6 at 2/9/2023  2:23 AM  Age: 57 years     - Lactulose 4 times daily  - Holding home Lasix given concern for dehydration  - Continue spironolactone   - Holding home propranolol given bradycardia  - Low-sodium diet, fluid restriction 1500 mL  - Nutrition consulted  - Mentation improving  - PT/OT      VTE Risk Mitigation (From admission, onward)           Ordered     IP VTE LOW RISK PATIENT  Once         02/07/23 1635     Place sequential compression device  Until discontinued         02/07/23 1635                    Discharge Planning   BRAYAN:      Code Status: Full Code   Is the patient medically ready for discharge?: No    Reason for patient still in hospital (select all that apply): Treatment  Discharge Plan A: Skilled Nursing Facility   Discharge Delays: None known at this time              Seth Noyola MD  Department of Hospital Medicine   [facility] - Intensive Care ([place]-16)

## 2023-02-10 NOTE — PLAN OF CARE
Problem: Occupational Therapy  Goal: Occupational Therapy Goal  Description: Goals to be met by: 2/23/2023    Patient will increase functional independence with ADLs by performing:    Feeding with [place].  UE Dressing with [place].  LE Dressing with [place].  Grooming while standing at sink with Modified [place] using AD PRN.  Toileting from toilet with [place] for hygiene and clothing management.   Toilet transfer to toilet with Modified [place] using AD PRN.  Step transfer with Modified [place] using AD PRN.    Outcome: Ongoing, Progressing

## 2023-02-10 NOTE — PT/OT/SLP PROGRESS
"Physical Therapy Treatment    Patient Name:  Marely Hamilton   MRN:  [id number]    Recommendations:     Discharge Recommendations: nursing facility, skilled  Discharge Equipment Recommendations: other (see comments)  Barriers to discharge:  Pt currently requiring increased level of assistance with mobility    Assessment:     Marely Hamilton is a 57 y.o. female admitted with a medical diagnosis of Hepatic encephalopathy.  She presents with the following impairments/functional limitations: weakness, impaired endurance, impaired self care skills, impaired functional mobility, gait instability, impaired balance, impaired cognition, impaired fine motor, decreased upper extremity function, decreased lower extremity function, decreased safety awareness. Pt was calm and cooperative during therapy. Pt was oriented to place and self and followed commands 75%. Pt required redirection during gait training using RW. Pt was able to perform therapeutic exercises in bed AROM.  Pt will cont to benefit from skilled acute PT to improve mobility and endurance.    Rehab Prognosis: Good; patient would benefit from acute skilled PT services to address these deficits and reach maximum level of function.    Recent Surgery: * No surgery found *      Plan:     During this hospitalization, patient to be seen 3 x/week to address the identified rehab impairments via gait training, therapeutic activities, therapeutic exercises, neuromuscular re-education and progress toward the following goals:    Plan of Care Expires:  03/09/23    Subjective     Chief Complaint: None  Patient/Family Comments/goals: "Thank you"  Pain/Comfort:  Pain Rating 1: 0/10      Objective:     Communicated with nurse prior to session.  Patient found HOB elevated with bed alarm, peripheral IV, baugh catheter upon PT entry to room.     General Precautions: Standard, fall  Orthopedic Precautions: N/A  Braces: N/A  Respiratory Status: Room air     Functional Mobility:  Bed " Mobility:     Rolling Left:  stand by assistance  Rolling Right: stand by assistance  Scooting: stand by assistance  Supine to Sit: stand by assistance  Sit to Supine: stand by assistance  Transfers:     Sit to Stand:  contact guard assistance with no AD  Gait: 16 ft in room w/ RW @ CGA mod v/c to navigate using RW, decreased step length, decrease alex  Balance: Fair standing balance w/ RW      AM-PAC 6 CLICK MOBILITY  Turning over in bed (including adjusting bedclothes, sheets and blankets)?: 4  Sitting down on and standing up from a chair with arms (e.g., wheelchair, bedside commode, etc.): 4  Moving from lying on back to sitting on the side of the bed?: 3  Moving to and from a bed to a chair (including a wheelchair)?: 3  Need to walk in hospital room?: 3  Climbing 3-5 steps with a railing?: 2  Basic Mobility Total Score: 19       Treatment & Education:  Therapeutic exercises: hip abd/add, marching, and AP's x 10 reps  Pt was educated on pacing activities and performing exercises in bed to maintain mobility.     Patient left HOB elevated with all lines intact, call button in reach, bed alarm on, and nurse present..    GOALS:   Multidisciplinary Problems       Physical Therapy Goals          Problem: Physical Therapy    Goal Priority Disciplines Outcome Goal Variances Interventions   Physical Therapy Goal     PT, PT/OT Ongoing, Progressing     Description: Goals to be met by: 2023     Patient will increase functional independence with mobility by performin. Supine to sit with [place]  2. Sit to supine with [place]  3. Sit to stand transfer with Supervision  4. Bed to chair transfer with Supervision using LRAD  5. Gait  x 150 feet with Supervision using LRAD.   6. Ascend/descend 1 stair with no Handrails Stand-by Assistance using LRAD.                          Time Tracking:     PT Received On: 02/10/23  PT Start Time: 900     PT Stop Time: 923  PT Total Time (min): 23 min     Billable  Minutes: Therapeutic Activity 13 and Therapeutic Exercise 10    Treatment Type: Treatment  PT/PTA: PTA     PTA Visit Number: 1     02/10/2023

## 2023-02-11 LAB
ALBUMIN SERPL BCP-MCNC: 2.3 G/DL (ref 3.5–5.2)
ALP SERPL-CCNC: 104 U/L (ref 55–135)
ALT SERPL W/O P-5'-P-CCNC: 19 U/L (ref 10–44)
AMMONIA PLAS-SCNC: 58 UMOL/L (ref 10–50)
ANION GAP SERPL CALC-SCNC: 8 MMOL/L (ref 8–16)
AST SERPL-CCNC: 46 U/L (ref 10–40)
BASOPHILS # BLD AUTO: 0.02 K/UL (ref 0–0.2)
BASOPHILS NFR BLD: 0.6 % (ref 0–1.9)
BILIRUB SERPL-MCNC: 3.1 MG/DL (ref 0.1–1)
BUN SERPL-MCNC: 4 MG/DL (ref 6–20)
CALCIUM SERPL-MCNC: 8.4 MG/DL (ref 8.7–10.5)
CHLORIDE SERPL-SCNC: 109 MMOL/L (ref 95–110)
CO2 SERPL-SCNC: 21 MMOL/L (ref 23–29)
CREAT SERPL-MCNC: 0.6 MG/DL (ref 0.5–1.4)
DIFFERENTIAL METHOD: ABNORMAL
EOSINOPHIL # BLD AUTO: 0.1 K/UL (ref 0–0.5)
EOSINOPHIL NFR BLD: 3.8 % (ref 0–8)
ERYTHROCYTE [DISTWIDTH] IN BLOOD BY AUTOMATED COUNT: 16.3 % (ref 11.5–14.5)
EST. GFR  (NO RACE VARIABLE): >60 ML/MIN/1.73 M^2
GLUCOSE SERPL-MCNC: 88 MG/DL (ref 70–110)
HCT VFR BLD AUTO: 29.7 % (ref 37–48.5)
HGB BLD-MCNC: 9.9 G/DL (ref 12–16)
IMM GRANULOCYTES # BLD AUTO: 0.01 K/UL (ref 0–0.04)
IMM GRANULOCYTES NFR BLD AUTO: 0.3 % (ref 0–0.5)
INR PPP: 1.7 (ref 0.8–1.2)
LYMPHOCYTES # BLD AUTO: 1.1 K/UL (ref 1–4.8)
LYMPHOCYTES NFR BLD: 35 % (ref 18–48)
MAGNESIUM SERPL-MCNC: 1.8 MG/DL (ref 1.6–2.6)
MCH RBC QN AUTO: 33.7 PG (ref 27–31)
MCHC RBC AUTO-ENTMCNC: 33.3 G/DL (ref 32–36)
MCV RBC AUTO: 101 FL (ref 82–98)
MONOCYTES # BLD AUTO: 0.3 K/UL (ref 0.3–1)
MONOCYTES NFR BLD: 8.8 % (ref 4–15)
NEUTROPHILS # BLD AUTO: 1.7 K/UL (ref 1.8–7.7)
NEUTROPHILS NFR BLD: 51.5 % (ref 38–73)
NRBC BLD-RTO: 0 /100 WBC
PHOSPHATE SERPL-MCNC: 3.1 MG/DL (ref 2.7–4.5)
PLATELET # BLD AUTO: 75 K/UL (ref 150–450)
PMV BLD AUTO: 9.9 FL (ref 9.2–12.9)
POTASSIUM SERPL-SCNC: 3.9 MMOL/L (ref 3.5–5.1)
PROT SERPL-MCNC: 4.7 G/DL (ref 6–8.4)
PROTHROMBIN TIME: 17.2 SEC (ref 9–12.5)
RBC # BLD AUTO: 2.94 M/UL (ref 4–5.4)
SODIUM SERPL-SCNC: 138 MMOL/L (ref 136–145)
WBC # BLD AUTO: 3.2 K/UL (ref 3.9–12.7)

## 2023-02-11 PROCEDURE — 12000002 HC ACUTE/MED SURGE SEMI-PRIVATE ROOM

## 2023-02-11 PROCEDURE — 25000003 PHARM REV CODE 250: Performed by: STUDENT IN AN ORGANIZED HEALTH CARE EDUCATION/TRAINING PROGRAM

## 2023-02-11 PROCEDURE — 36415 COLL VENOUS BLD VENIPUNCTURE: CPT | Performed by: HOSPITALIST

## 2023-02-11 PROCEDURE — 51798 US URINE CAPACITY MEASURE: CPT

## 2023-02-11 PROCEDURE — 82140 ASSAY OF AMMONIA: CPT | Performed by: HOSPITALIST

## 2023-02-11 PROCEDURE — 99232 SBSQ HOSP IP/OBS MODERATE 35: CPT | Mod: ,,, | Performed by: HOSPITALIST

## 2023-02-11 PROCEDURE — 80053 COMPREHEN METABOLIC PANEL: CPT | Performed by: STUDENT IN AN ORGANIZED HEALTH CARE EDUCATION/TRAINING PROGRAM

## 2023-02-11 PROCEDURE — 63600175 PHARM REV CODE 636 W HCPCS: Performed by: HOSPITALIST

## 2023-02-11 PROCEDURE — 83735 ASSAY OF MAGNESIUM: CPT | Performed by: STUDENT IN AN ORGANIZED HEALTH CARE EDUCATION/TRAINING PROGRAM

## 2023-02-11 PROCEDURE — 84100 ASSAY OF PHOSPHORUS: CPT | Performed by: STUDENT IN AN ORGANIZED HEALTH CARE EDUCATION/TRAINING PROGRAM

## 2023-02-11 PROCEDURE — 85025 COMPLETE CBC W/AUTO DIFF WBC: CPT | Performed by: STUDENT IN AN ORGANIZED HEALTH CARE EDUCATION/TRAINING PROGRAM

## 2023-02-11 PROCEDURE — 85610 PROTHROMBIN TIME: CPT | Performed by: STUDENT IN AN ORGANIZED HEALTH CARE EDUCATION/TRAINING PROGRAM

## 2023-02-11 PROCEDURE — 25000003 PHARM REV CODE 250: Performed by: HOSPITALIST

## 2023-02-11 PROCEDURE — 99232 PR SUBSEQUENT HOSPITAL CARE,LEVL II: ICD-10-PCS | Mod: ,,, | Performed by: HOSPITALIST

## 2023-02-11 RX ORDER — LACTULOSE 10 G/15ML
10 SOLUTION ORAL 3 TIMES DAILY
Status: DISCONTINUED | OUTPATIENT
Start: 2023-02-11 | End: 2023-02-12

## 2023-02-11 RX ORDER — SODIUM BICARBONATE 650 MG/1
1300 TABLET ORAL 3 TIMES DAILY
Status: COMPLETED | OUTPATIENT
Start: 2023-02-11 | End: 2023-02-12

## 2023-02-11 RX ADMIN — THIAMINE HCL TAB 100 MG 100 MG: 100 TAB at 10:02

## 2023-02-11 RX ADMIN — CEFTRIAXONE 1 G: 1 INJECTION, POWDER, FOR SOLUTION INTRAMUSCULAR; INTRAVENOUS at 10:02

## 2023-02-11 RX ADMIN — ARIPIPRAZOLE 10 MG: 5 TABLET ORAL at 10:02

## 2023-02-11 RX ADMIN — HYDROXYZINE HYDROCHLORIDE 50 MG: 25 TABLET, FILM COATED ORAL at 09:02

## 2023-02-11 RX ADMIN — LACTULOSE 10 G: 20 SOLUTION ORAL at 02:02

## 2023-02-11 RX ADMIN — LITHIUM CARBONATE 300 MG: 300 TABLET, FILM COATED, EXTENDED RELEASE ORAL at 09:02

## 2023-02-11 RX ADMIN — LACTULOSE 10 G: 20 SOLUTION ORAL at 09:02

## 2023-02-11 RX ADMIN — SODIUM BICARBONATE 1300 MG: 650 TABLET ORAL at 02:02

## 2023-02-11 RX ADMIN — AMOXICILLIN 875 MG: 875 TABLET, COATED ORAL at 10:02

## 2023-02-11 RX ADMIN — LEVETIRACETAM 1000 MG: 500 TABLET, FILM COATED ORAL at 09:02

## 2023-02-11 RX ADMIN — SODIUM BICARBONATE 1300 MG: 650 TABLET ORAL at 09:02

## 2023-02-11 RX ADMIN — AMOXICILLIN 875 MG: 875 TABLET, COATED ORAL at 09:02

## 2023-02-11 RX ADMIN — THERA TABS 1 TABLET: TAB at 10:02

## 2023-02-11 RX ADMIN — LEVETIRACETAM 1000 MG: 500 TABLET, FILM COATED ORAL at 10:02

## 2023-02-11 RX ADMIN — PANTOPRAZOLE SODIUM 40 MG: 40 TABLET, DELAYED RELEASE ORAL at 10:02

## 2023-02-11 RX ADMIN — LACTULOSE 10 G: 20 SOLUTION ORAL at 10:02

## 2023-02-11 RX ADMIN — FOLIC ACID 1 MG: 1 TABLET ORAL at 10:02

## 2023-02-11 NOTE — ASSESSMENT & PLAN NOTE
Patient's with macrocytic anemia.. Hemoglobin stable. Etiology likely due to chronic disease . B12 and folate levels   Current CBC reviewed-    Recent Labs   Lab 02/12/23  0241 02/13/23  0338 02/14/23  0457   HGB 9.1* 9.4* 9.6*     Monitor CBC and transfuse if H/H drops below 7/21.

## 2023-02-11 NOTE — ASSESSMENT & PLAN NOTE
patient with INR   Recent Labs   Lab 02/12/23  0241 02/13/23  0338 02/14/23  0457   INR 1.7* 1.5* 1.6*   . INR is supratherapeutic. secondary to coagulopathy from liver disease.monitor

## 2023-02-11 NOTE — PROGRESS NOTES
[facility] ([address] Medicine  Progress Note    Patient Name: Marely Hamilton  MRN: [id number]  Patient Class: IP- Inpatient   Admission Date: 2/7/2023  Length of Stay: 4 days  Attending Physician: Seth Noyola MD  Primary Care Provider: Viktor Ross MD        Subjective:     Principal Problem:Hepatic encephalopathy        HPI:  Ms. Hamilton is a 57 yoF with a h/o alcoholic cirrhosis, hepatic encephalopathy, seizure, bipolar disorder who present with altered mental status.  History taken from chart review given patient only able to state that she is confused.  Per report, EMS was called by patient's sister given concern for worsening mental status.  Sister also states patient has become less able take care of herself and manage her medications.  Believes patient has not taking her lactulose in over a week.    In the ED, VSS.  Labs notable for creatinine 0.4, hemoglobin 12.5, bilirubin 3.1, platelets 59, INR 1.6, ammonia 131.  CT head unremarkable.          Overview/Hospital Course:  2/9 K and P replaced. UA + UC pending. stared on IV ceftriaxone . CX ray -No significant intrathoracic abnormality.and BCx 2 . sono abdomen with no ascitis  2/10  SVT 160s overnight ,asymptomatic improved with  1L NS bolus initiated and carotid massage . converted to NSR on EKG. K, MG and P replaced. 9 episodes of BMs yesterday. lactulose held. severe metabolic acidosis likely from diarrhea. started on sodium bicarbonate drip . UC - GRAM NEGATIVE BILLY >100,000 cfu/ml Identification and susceptibility pending .ENTEROCOCCUS SPECIES > 100,000 cfu/ml dentification and susceptibility pending. started on amoxicillin  2/11 BCX 2 NGTD. Ammonia trended down to 58. Bicarb at 21. Saldaña cath discontinued. resumed lactulose at  10g TID          Review of Systems:   Pain scale:   Unable to perform ROS: Mental status change   Constitutional:  fever,  chills, headache, vision loss, hearing loss, weight loss,  Generalized weakness, falls, loss of smell, loss of taste, poor appetite,  sore throat, epistaxis- resolved  Respiratory: cough, shortness of breath.   Cardiovascular: chest pain, dizziness, palpitations, orthopnea, swelling of feet, syncope  Gastrointestinal: nausea, vomiting, abdominal pain- suprapubic -improved , diarrhea -improved , black stool,  blood in stool, change in bowel habits  Genitourinary: hematuria, dysuria, urgency, frequency  Integument/Breast: rash,  pruritis  Hematologic/Lymphatic: easy bruising, lymphadenopathy  Musculoskeletal: arthralgias , myalgias, back pain, neck pain, knee pain  Neurological: confusion, seizures, tremors, slurred speech  Behavioral/Psych:  depression, anxiety, auditory or visual hallucinations     OBJECTIVE:     Physical Exam:  Body mass index is 22.85 kg/m².    Constitutional: Appears well-developed and well-nourished.   Head: Normocephalic and atraumatic.   Neck: Normal range of motion. Neck supple.   Cardiovascular: Normal heart rate.  Regular heart rhythm.  Pulmonary/Chest: Effort normal.   Abdominal: No distension.  + suprapubic  tenderness- improved  Musculoskeletal: Normal range of motion. No edema.   Neurological: Alert and oriented to person, place, month  follows simple commands  Skin: Skin is warm and dry.   Psychiatric: Normal mood and affect. Behavior is normal.                  Vital Signs  Temp: 98.3 °F (36.8 °C) (02/11/23 1130)  Pulse: 86 (02/11/23 1134)  Resp: 18 (02/11/23 1130)  BP: (Abnormal) 112/57 (02/11/23 1130)  SpO2: (Abnormal) 94 % (02/11/23 1130)       24 Hour VS Range    Temp:  [97.7 °F (36.5 °C)-98.9 °F (37.2 °C)]   Pulse:  [83-92]   Resp:  [14-20]   BP: ()/(54-58)   SpO2:  [93 %-97 %]     Intake/Output Summary (Last 24 hours) at 2/11/2023 1532  Last data filed at 2/11/2023 0524  Gross per 24 hour   Intake 480 ml   Output 900 ml   Net -420 ml         I/O This Shift:  No intake/output data recorded.    Wt Readings from Last 3 Encounters:    02/09/23 58.5 kg (129 lb)   01/27/23 58.9 kg (129 lb 13.6 oz)   11/02/21 58.9 kg (129 lb 13.6 oz)       I have personally reviewed the vitals and recorded Intake/Output     Laboratory/Diagnostic Data:    CBC/Anemia Labs: Coags:    Recent Labs   Lab 02/09/23  0223 02/10/23  0436 02/11/23  0451   WBC 4.44 5.12 3.20*   HGB 11.4* 11.2* 9.9*   HCT 34.1* 33.6* 29.7*   PLT 95* 93* 75*   * 99* 101*   RDW 16.1* 16.2* 16.3*   FOLATE  --  15.1  --    [id number]  --  944  --     Recent Labs   Lab 02/07/23  1424 02/08/23  0338 02/09/23  0223 02/10/23  0436 02/11/23  0451   INR 1.6*   < > 1.6* 1.6* 1.7*   APTT 32.3*  --   --   --   --     < > = values in this interval not displayed.        Chemistries: ABG:   Recent Labs   Lab 02/09/23  0222 02/10/23  0437 02/10/23  1609 02/11/23  0451    136 134* 138   K 3.1* 3.3* 4.6 3.9   * 115* 111* 109   CO2 13* 11* 18* 21*   BUN 9 5* 3* 4*   CREATININE 0.8 0.6 0.6 0.6   CALCIUM 9.2 9.5 8.0* 8.4*   PROT 5.6* 5.5*  --  4.7*   BILITOT 3.0* 3.0*  --  3.1*   ALKPHOS 86 100  --  104   ALT 15 17  --  19   AST 35 44*  --  46*   MG 1.7 1.6  --  1.8   PHOS 2.5* 2.1* 2.2* 3.1    Recent Labs   Lab 02/10/23  0537   PH 7.456*   PCO2 23.6*   PO2 117*   HCO3 16.6*   POCSATURATED 99   BE -7        POCT Glucose: HbA1c:    Recent Labs   Lab 02/10/23  0504   POCTGLUCOSE 114*    Hemoglobin A1C   Date Value Ref Range Status   01/22/2021 4.1 4.0 - 5.6 % Final     Comment:     ADA Screening Guidelines:  5.7-6.4%  Consistent with prediabetes  >or=6.5%  Consistent with diabetes  High levels of fetal hemoglobin interfere with the HbA1C  assay. Heterozygous hemoglobin variants (HbS, HgC, etc)do  not significantly interfere with this assay.   However, presence of multiple variants may affect accuracy.     12/10/2020 4.6 4.0 - 5.6 % Final     Comment:     ADA Screening Guidelines:  5.7-6.4%  Consistent with prediabetes  >or=6.5%  Consistent with diabetes  High levels of fetal hemoglobin interfere  with the HbA1C  assay. Heterozygous hemoglobin variants (HbS, HgC, etc)do  not significantly interfere with this assay.   However, presence of multiple variants may affect accuracy.     05/16/2020 4.1 4.0 - 5.6 % Final     Comment:     ADA Screening Guidelines:  5.7-6.4%  Consistent with prediabetes  >or=6.5%  Consistent with diabetes  High levels of fetal hemoglobin interfere with the HbA1C  assay. Heterozygous hemoglobin variants (HbS, HgC, etc)do  not significantly interfere with this assay.   However, presence of multiple variants may affect accuracy.          Cardiac Enzymes: Ejection Fractions:    No results for input(s): CPK, CPKMB, MB, TROPONINI in the last 72 hours. No results found for: EF       No results for input(s): COLORU, APPEARANCEUA, PHUR, SPECGRAV, PROTEINUA, GLUCUA, KETONESU, BILIRUBINUA, OCCULTUA, NITRITE, UROBILINOGEN, LEUKOCYTESUR, RBCUA, WBCUA, BACTERIA, SQUAMEPITHEL, HYALINECASTS in the last 48 hours.    Invalid input(s): WRIGHTSUR      Lactate (Lactic Acid) (mmol/L)   Date Value   11/02/2021 1.8   01/11/2021 2.0   01/15/2017 2.3 (H)     No results found for: BNP  No results found for: CRP, SEDRATE  No results found for: DDIMER  Ferritin (ng/mL)   Date Value   10/23/2015 412 (H)   09/19/2015 599 (H)   09/19/2015 599 (H)   07/23/2015 551 (H)   06/17/2015 612 (H)     LD (U/L)   Date Value   09/19/2015 280 (H)     Troponin I (ng/mL)   Date Value   01/25/2023 <0.006     CPK (U/L)   Date Value   01/25/2023 27   06/14/2020 23 (L)     No results found for this or any previous visit.  SARS-CoV-2 RNA, Amplification, Qual (no units)   Date Value   11/02/2021 Negative   01/21/2021 Negative   01/11/2021 Negative   11/21/2020 Negative   06/16/2020 Negative       Microbiology labs for the last week  Microbiology Results (last 7 days)       Procedure Component Value Units Date/Time    Blood culture [[id number]] Collected: 02/09/23 0847    Order Status: Completed Specimen: Blood Updated: 02/11/23 1012      "Blood Culture, Routine No Growth to date      No Growth to date      No Growth to date    Blood culture [[id number]] Collected: 02/09/23 0847    Order Status: Completed Specimen: Blood Updated: 02/11/23 1012     Blood Culture, Routine No Growth to date      No Growth to date      No Growth to date    Urine culture [[id number]]  (Abnormal)  (Susceptibility) Collected: 02/08/23 1230    Order Status: Completed Specimen: Urine Updated: 02/11/23 0215     Urine Culture, Routine ESCHERICHIA COLI  >100,000 cfu/ml        ENTEROCOCCUS SPECIES  > 100,000 cfu/ml  Identification and susceptibility pending      Narrative:      Specimen Source->Urine            Reviewed and noted in plan where applicable- Please see chart for full lab data.    Lines/Drains:       Peripheral IV - Single Lumen 02/07/23 1425 20 G Anterior;Right Upper Arm (Active)   Site Assessment Clean;Dry;Intact 02/09/23 0800   Extremity Assessment Distal to IV No swelling;No redness;No abnormal discoloration 02/09/23 0800   Line Status Saline locked 02/09/23 0800   Dressing Status Clean;Dry;Intact 02/09/23 0800   Dressing Intervention Integrity maintained 02/09/23 0800   Dressing Change Due 02/11/23 02/09/23 0800   Site Change Due 02/11/23 02/09/23 0800   Reason Not Rotated Not due 02/09/23 0800   Number of days: 2            Urethral Catheter 02/08/23 1240 16 Fr. (Active)   $ Saldaña Insertion Bedside Insertion Performed 02/09/23 1305   Site Assessment Clean;Intact 02/09/23 0800   Collection Container Urimeter 02/09/23 0800   Securement Method secured to top of thigh w/ adhesive device 02/09/23 0800   Catheter Care Performed yes 02/09/23 0800   Reason for Continuing Urinary Catheterization Urinary retention 02/09/23 0800   CAUTI Prevention Bundle Securement Device in place with 1" slack 02/09/23 0800   Output (mL) 200 mL 02/09/23 0800   Number of days: 1       Imaging  ECG Results              EKG 12-lead (Final result)  Result time 02/07/23 15:57:30      Final " result by Interface, Lab In [facility] (02/07/23 15:57:30)               Narrative:    Test Reason : R41.82,    Vent. Rate : 056 BPM     Atrial Rate : 056 BPM     P-R Int : 152 ms          QRS Dur : 082 ms      QT Int : 438 ms       P-R-T Axes : 077 036 087 degrees     QTc Int : 422 ms    Artifact  Sinus bradycardia  Nonspecific T wave abnormality  Abnormal ECG  When compared with ECG of 25-JAN-2023 11:13,  Poor data quality in current ECG precludes serial comparison    Confirmed by Ayush Hay MD ([id number]) on 2/7/2023 3:57:21 PM    Referred By: [email or web address]   SELF           Confirmed By:Ayush Hay MD                                  Results for orders placed during the hospital encounter of 11/21/20    Echo Color Flow Doppler? Yes    Interpretation Summary  · The left ventricle is normal in size with normal systolic function. The estimated ejection fraction is 55%.  · There is left ventricular concentric remodeling.  · Normal left ventricular diastolic function.  · Normal right ventricular size with normal right ventricular systolic function.  · Normal central venous pressure (3 mmHg).      X-Ray Chest 1 View  Narrative: EXAMINATION:  XR CHEST 1 VIEW    CLINICAL HISTORY:  r/o pneumonia;    TECHNIQUE:  One view    COMPARISON:  Comparison is made to 01/25/2023.    FINDINGS:  Heart size is normal, as is the appearance of the pulmonary vascularity.  Lung zones are clear, and are free of significant airspace consolidation or volume loss.  No pleural fluid.  No hilar or mediastinal mass lesion.  No pneumothorax.  Impression: No significant intrathoracic abnormality.  No significant detrimental interval change in the appearance of the chest since 01/25/2023 is appreciated.    Electronically signed by: Americo Reyes MD  Date:    02/09/2023  Time:    11:12  US Abdomen Limited  Narrative: EXAMINATION:  US ABDOMEN LIMITED    CLINICAL HISTORY:  r/o ascitis;    TECHNIQUE:  Limited ultrasound evaluation of the abdomen was performed  to evaluate for ascites.    COMPARISON:  Abdominal ultrasound 01/25/2023.  CT abdomen pelvis 01/21/2021.    FINDINGS:  Limited sonographic evaluation of the all 4 abdominal quadrants demonstrates no significant ascites.    Redemonstrated dilated varices within the right lower quadrant.  Impression: No significant ascites.    Electronically signed by resident: Wilian Daily  Date:    02/09/2023  Time:    10:15    Electronically signed by: Eriberto Holliday  Date:    02/09/2023  Time:    10:49      Labs, Imaging, EKG and Diagnostic results from 2/11/2023 were reviewed.    Medications:  Medication list was reviewed and changes noted under Assessment/Plan.  No current facility-administered medications on file prior to encounter.     Current Outpatient Medications on File Prior to Encounter   Medication Sig Dispense Refill    ARIPiprazole (ABILIFY) 10 MG Tab Take 10 mg by mouth nightly.      furosemide (LASIX) 20 MG tablet Take 0.5 tablets (10 mg total) by mouth 2 (two) times daily. 30 tablet 1    hydrOXYzine (ATARAX) 50 MG tablet Take 50 mg by mouth every evening.      lactulose (CHRONULAC) 10 gram/15 mL solution Take 45 mLs (30 g total) by mouth 3 (three) times daily. 3784 mL 3    levETIRAcetam (KEPPRA) 500 MG Tab Take 1,000 mg by mouth 2 (two) times daily.      lithium (LITHOTAB) 300 mg tablet Take 300 mg by mouth nightly.      mometasone (ELOCON) 0.1 % ointment Apply topically.      multivitamin Tab Take 1 tablet by mouth once daily.      pantoprazole (PROTONIX) 40 MG tablet Take 40 mg by mouth once daily.      propranoloL (INDERAL) 20 MG tablet Take 20 mg by mouth once daily.      QUEtiapine (SEROQUEL) 100 MG Tab Take 100 mg by mouth every evening.      spironolactone (ALDACTONE) 25 MG tablet Take 25 mg by mouth once daily.       Scheduled Medications:  amoxicillin, 875 mg, Oral, Q12H  ARIPiprazole, 10 mg, Oral, Nightly  folic acid, 1 mg, Oral, Daily  hydrOXYzine, 50 mg, Oral, QHS  lactulose, 10 g, Oral,  TID  levETIRAcetam, 1,000 mg, Oral, BID  lithium, 300 mg, Oral, QHS  multivitamin, 1 tablet, Oral, Daily  pantoprazole, 40 mg, Oral, Daily  sodium bicarbonate, 1,300 mg, Oral, TID  thiamine, 100 mg, Oral, Daily      PRN: acetaminophen, melatonin, naloxone, ondansetron, polyethylene glycol, prochlorperazine, sodium chloride 0.9%  Infusions:       Estimated Creatinine Clearance: 86.6 mL/min (based on SCr of 0.6 mg/dL).     Assessment/Plan:      * Hepatic encephalopathy  See above  Patient with hepatic enephalopathy  Recent Labs   Lab 02/11/23  0451   AMMONIA 58*     continue lactulose TID with goal of 3-4 bowel movements daily.  2/9 Alert and oriented to person, place. follows simple commands  2/11 BCX 2 NGTD. Ammonia trended down to 58  resumed lactulose at  10g TID    Metabolic acidosis   2/10 9 episodes of BMs yesterday. lactulose held. severe metabolic acidosis likely from diarrhea. \  likely from CKD/AYESHA . patient with bicarbonate   Recent Labs   Lab 02/10/23  0437 02/10/23  1609 02/11/23  0451   CO2 11* 18* 21*    .started on bicarbonate drip. monitor   2/11 off bicarbonate drip. continue sodium bicarbonate    Diarrhea  2/10. 9 episodes of BMs yesterday. lactulose held.   2/11 resolved.  resumed lactulose at  10g TID    SVT (supraventricular tachycardia)  2/19  SVT 160s overnight ,asymptomatic improved with  1L NS bolus initiated and carotid massage . converted to NSR on EKG.      Elevated INR  patient with INR   Recent Labs   Lab 02/09/23  0223 02/10/23  0436 02/11/23  0451   INR 1.6* 1.6* 1.7*   . INR is supratherapeutic. secondary to coagulopathy from liver disease.monitor      Hypophosphatemia  replaced      Hypokalemia    replaced     UTI (urinary tract infection)  2/9  UA + UC pending. stared on IV ceftriaxone .   2/10 . UC - GRAM NEGATIVE BILLY >100,000 cfu/ml Identification and susceptibility pending .ENTEROCOCCUS SPECIES > 100,000 cfu/ml dentification and susceptibility pending. started on  amoxicillin      Bipolar 1 disorder, depressed, severe  - Continue home lithium  - Continue home Abilify  - Holding home Seroquel    Malnutrition  Nutrition consulted      Anemia    Patient's with macrocytic anemia.. Hemoglobin stable. Etiology likely due to chronic disease . B12 and folate levels   Current CBC reviewed-    Recent Labs   Lab 02/09/23  0223 02/10/23  0436 02/11/23  0451   HGB 11.4* 11.2* 9.9*     Monitor CBC and transfuse if H/H drops below 7/21.       History of seizure  Continue home Keppra      Thrombocytopenia  Secondary to cirrhosis   Trend CBC  2/9     Patient with thrombocytopenia   Recent Labs   Lab 02/09/23  0223 02/10/23  0436 02/11/23  0451   PLT 95* 93* 75*   . Platelet counts stable.monitor    Cirrhosis, Laennec's  MELD-Na score: 16 at 2/9/2023  2:23 AM  MELD score: 16 at 2/9/2023  2:23 AM  Calculated from:  Serum Creatinine: 0.8 mg/dL (Using min of 1 mg/dL) at 2/9/2023  2:22 AM  Serum Sodium: 138 mmol/L (Using max of 137 mmol/L) at 2/9/2023  2:22 AM  Total Bilirubin: 3.0 mg/dL at 2/9/2023  2:22 AM  INR(ratio): 1.6 at 2/9/2023  2:23 AM  Age: 57 years     - Lactulose 4 times daily  - Holding home Lasix given concern for dehydration  - Continue spironolactone   - Holding home propranolol given bradycardia  - Low-sodium diet, fluid restriction 1500 mL  - Nutrition consulted  - Mentation improving  - PT/OT      VTE Risk Mitigation (From admission, onward)           Ordered     IP VTE LOW RISK PATIENT  Once         02/07/23 1635     Place sequential compression device  Until discontinued         02/07/23 1635                    Discharge Planning   BRAYAN:      Code Status: Full Code   Is the patient medically ready for discharge?: No    Reason for patient still in hospital (select all that apply): Treatment  Discharge Plan A: Skilled Nursing Facility   Discharge Delays: None known at this time              Seth Noyola MD  Department of Hospital Medicine   [facility] - Intensive [facility]  [id number])

## 2023-02-11 NOTE — ASSESSMENT & PLAN NOTE
See above  Patient with hepatic enephalopathy  Recent Labs   Lab 02/11/23  0451   AMMONIA 58*     continue lactulose TID with goal of 3-4 bowel movements daily.  2/9 Alert and oriented to person, place. follows simple commands  2/11 BCX 2 NGTD. Ammonia trended down to 58  resumed lactulose at  10g TID  2/14 lactulose increased to 30g TID. sister preferes [facility]

## 2023-02-11 NOTE — ASSESSMENT & PLAN NOTE
Secondary to cirrhosis   Trend CBC  2/9     Patient with thrombocytopenia   Recent Labs   Lab 02/12/23  0241 02/13/23  0338 02/14/23  0457   PLT 60* 58* 50*   . Platelet counts stable.monitor

## 2023-02-11 NOTE — PLAN OF CARE
"   [facility]     Department of Hospital Medicine     [address] [place] [address]     [phone] after hours  [phone] fax       NURSING HOME ORDERS    Patient Name: Marely Hamilton  YOB: [birth year]    Admit to Nursing Home:  skilled Bed                                              Diagnoses:  Active Hospital Problems    Diagnosis  POA    *Hepatic encephalopathy [K76.82]  Yes    SVT (supraventricular tachycardia) [I47.1]  Yes    Diarrhea [R19.7]  Yes    Metabolic acidosis [E87.20]  Yes    Hypophosphatemia [E83.39]  Yes    Elevated INR [R79.1]  Yes    Hypokalemia [E87.6]  Unknown    UTI (urinary tract infection) [N39.0]  Unknown    Bipolar 1 disorder, depressed, severe [F31.4]  Yes    Malnutrition [E46]  Yes    Anemia [D64.9]  Yes     6 units PRBC since June 2015      Cirrhosis, Laennec's [K70.30]  Yes    History of seizure [Z87.898]  Yes     One seizure 2013- "low blood sugar from a starvation diet"      Thrombocytopenia [D69.6]  Yes      Resolved Hospital Problems   No resolved problems to display.       Patient is homebound due to:  Hepatic encephalopathy    Allergies:  Review of patient's allergies indicates:   Allergen Reactions    Sulfa (sulfonamide antibiotics) Rash    Codeine Nausea And Vomiting       Vitals:       Every shift (Skilled Nursing patients)    Diet: cardiac diet                  Acitivities:    - Up in a chair each morning as tolerated  - Ambulate with assistance to bathroom  - Scheduled walks once each shift (every 8 hours)  - May ambulate independently  - May use walker, cane, or self-propelled wheelchair       - Weight bearing: as tolerated      LABS:  Per facility protocol     Nursing Precautions:     - Aspiration precautions:             - Total assistance with meals            -  Upright 90 degrees befor during and after meals             -  Suction at bedside          - Fall precautions per nursing home " protocol   - Seizure precaution per assisted protocol   - Decubitus precautions:        -  for positioning   - Pressure reducing foam mattress   - Turn patient every two hours. Use wedge pillows to anchor patient    CONSULTS:      Physical Therapy to evaluate and treat     Occupational Therapy to evaluate and treat        MISCELLANEOUS CARE:       Routine Skin for Bedridden Patients:  Apply moisture barrier cream to all    skin folds and wet areas in perineal area daily and after baths and                           all bowel movements.        DIABETES CARE:         Medications: Discontinue all previous medication orders, if any. See new list below.        Medication List        Start taking these medications      folic acid 1 MG tablet  Commonly known as: FOLVITE  Take 1 tablet (1 mg total) by mouth once daily.  Start taking on: February 13, 2023     thiamine 100 MG tablet  Take 1 tablet (100 mg total) by mouth once daily.  Start taking on: February 13, 2023            Change how you take these medications      lactulose 10 gram/15 mL solution  Commonly known as: CHRONULAC  Take 30 mLs (20 g total) by mouth 3 (three) times daily.  What changed: how much to take            Continue taking these medications      ARIPiprazole 10 MG Tab  Commonly known as: ABILIFY  Take 10 mg by mouth nightly.     furosemide 20 MG tablet  Commonly known as: LASIX  Take 0.5 tablets (10 mg total) by mouth 2 (two) times daily.     hydrOXYzine 50 MG tablet  Commonly known as: ATARAX  Take 50 mg by mouth every evening.     levETIRAcetam 500 MG Tab  Commonly known as: KEPPRA  Take 1,000 mg by mouth 2 (two) times daily.     lithium 300 mg tablet  Commonly known as: LITHOTAB  Take 300 mg by mouth nightly.     multivitamin Tab  Take 1 tablet by mouth once daily.     pantoprazole 40 MG tablet  Commonly known as: PROTONIX  Take 40 mg by mouth once daily.     propranoloL 20 MG tablet  Commonly known as: INDERAL  Take 20 mg by mouth once  daily.     QUEtiapine 100 MG Tab  Commonly known as: SEROQUEL  Take 100 mg by mouth every evening.     spironolactone 25 MG tablet  Commonly known as: ALDACTONE  Take 25 mg by mouth once daily.            Stop taking these medications      mometasone 0.1 % ointment  Commonly known as: ELOCON                   _________________________________  Seth Noyola MD  02/12/2023

## 2023-02-11 NOTE — PLAN OF CARE
Patient continues on room air. Denies pain. IV abx continued. Lactulose continued. Oral intake encouraged.   1500- patient had not urinated, [place] [facility] [place]. Bladder scanned patient- 272mL. MD notified.   1750- bladder scanned patient- 355mL  Per MD, straight cath x1 if bladder scan is >400mL  Patient stable at this time, no acute changes.       Problem: Adult Inpatient Plan of Care  Goal: Plan of Care Review  Outcome: Ongoing, Progressing  Goal: Patient-Specific Goal (Individualized)  Outcome: Ongoing, Progressing  Goal: Absence of Hospital-Acquired Illness or Injury  Outcome: Ongoing, Progressing  Goal: Optimal Comfort and Wellbeing  Outcome: Ongoing, Progressing  Goal: Readiness for Transition of Care  Outcome: Ongoing, Progressing     Problem: Skin Injury Risk Increased  Goal: Skin Health and Integrity  Outcome: Ongoing, Progressing     Problem: Infection  Goal: Absence of Infection Signs and Symptoms  Outcome: Ongoing, Progressing     Problem: Fluid Volume Excess  Goal: Fluid Balance  Outcome: Ongoing, Progressing     Problem: Urinary Retention  Goal: Effective Urinary Elimination  Outcome: Ongoing, Progressing

## 2023-02-11 NOTE — PLAN OF CARE
Problem: Fluid Volume Excess  Goal: Fluid Balance  Outcome: Ongoing, Progressing     Problem: Urinary Retention  Goal: Effective Urinary Elimination  Outcome: Ongoing, Progressing    Pt  able to express needs.  NO c/o pain. Saldaña cath discontinued on day shift yesterday.  Pure wick in place.  Appetite better, but needs assistance/encouragement  with meals.  Safety maintained.  Bed in low position,  call  light in reach.

## 2023-02-12 LAB
ALBUMIN SERPL BCP-MCNC: 2.1 G/DL (ref 3.5–5.2)
ALP SERPL-CCNC: 95 U/L (ref 55–135)
ALT SERPL W/O P-5'-P-CCNC: 19 U/L (ref 10–44)
ANION GAP SERPL CALC-SCNC: 9 MMOL/L (ref 8–16)
AST SERPL-CCNC: 47 U/L (ref 10–40)
BACTERIA UR CULT: ABNORMAL
BACTERIA UR CULT: ABNORMAL
BASOPHILS # BLD AUTO: 0.02 K/UL (ref 0–0.2)
BASOPHILS NFR BLD: 0.8 % (ref 0–1.9)
BILIRUB SERPL-MCNC: 2.7 MG/DL (ref 0.1–1)
BUN SERPL-MCNC: 4 MG/DL (ref 6–20)
CALCIUM SERPL-MCNC: 8.4 MG/DL (ref 8.7–10.5)
CHLORIDE SERPL-SCNC: 105 MMOL/L (ref 95–110)
CO2 SERPL-SCNC: 20 MMOL/L (ref 23–29)
CREAT SERPL-MCNC: 0.5 MG/DL (ref 0.5–1.4)
DIFFERENTIAL METHOD: ABNORMAL
EOSINOPHIL # BLD AUTO: 0.1 K/UL (ref 0–0.5)
EOSINOPHIL NFR BLD: 3.4 % (ref 0–8)
ERYTHROCYTE [DISTWIDTH] IN BLOOD BY AUTOMATED COUNT: 16.3 % (ref 11.5–14.5)
EST. GFR  (NO RACE VARIABLE): >60 ML/MIN/1.73 M^2
GLUCOSE SERPL-MCNC: 81 MG/DL (ref 70–110)
HCT VFR BLD AUTO: 27.7 % (ref 37–48.5)
HGB BLD-MCNC: 9.1 G/DL (ref 12–16)
IMM GRANULOCYTES # BLD AUTO: 0.01 K/UL (ref 0–0.04)
IMM GRANULOCYTES NFR BLD AUTO: 0.4 % (ref 0–0.5)
INR PPP: 1.7 (ref 0.8–1.2)
LYMPHOCYTES # BLD AUTO: 0.9 K/UL (ref 1–4.8)
LYMPHOCYTES NFR BLD: 39.5 % (ref 18–48)
MAGNESIUM SERPL-MCNC: 1.5 MG/DL (ref 1.6–2.6)
MCH RBC QN AUTO: 34.1 PG (ref 27–31)
MCHC RBC AUTO-ENTMCNC: 32.9 G/DL (ref 32–36)
MCV RBC AUTO: 104 FL (ref 82–98)
MONOCYTES # BLD AUTO: 0.2 K/UL (ref 0.3–1)
MONOCYTES NFR BLD: 10.1 % (ref 4–15)
NEUTROPHILS # BLD AUTO: 1.1 K/UL (ref 1.8–7.7)
NEUTROPHILS NFR BLD: 45.8 % (ref 38–73)
NRBC BLD-RTO: 0 /100 WBC
PHOSPHATE SERPL-MCNC: 3 MG/DL (ref 2.7–4.5)
PLATELET # BLD AUTO: 60 K/UL (ref 150–450)
PMV BLD AUTO: 9.9 FL (ref 9.2–12.9)
POTASSIUM SERPL-SCNC: 3.6 MMOL/L (ref 3.5–5.1)
PROT SERPL-MCNC: 4.3 G/DL (ref 6–8.4)
PROTHROMBIN TIME: 17.1 SEC (ref 9–12.5)
RBC # BLD AUTO: 2.67 M/UL (ref 4–5.4)
SODIUM SERPL-SCNC: 134 MMOL/L (ref 136–145)
WBC # BLD AUTO: 2.38 K/UL (ref 3.9–12.7)

## 2023-02-12 PROCEDURE — 99233 PR SUBSEQUENT HOSPITAL CARE,LEVL III: ICD-10-PCS | Mod: ,,, | Performed by: HOSPITALIST

## 2023-02-12 PROCEDURE — 63600175 PHARM REV CODE 636 W HCPCS: Performed by: HOSPITALIST

## 2023-02-12 PROCEDURE — 25000003 PHARM REV CODE 250: Performed by: HOSPITALIST

## 2023-02-12 PROCEDURE — 83735 ASSAY OF MAGNESIUM: CPT | Performed by: STUDENT IN AN ORGANIZED HEALTH CARE EDUCATION/TRAINING PROGRAM

## 2023-02-12 PROCEDURE — 99233 SBSQ HOSP IP/OBS HIGH 50: CPT | Mod: ,,, | Performed by: HOSPITALIST

## 2023-02-12 PROCEDURE — 36415 COLL VENOUS BLD VENIPUNCTURE: CPT | Performed by: STUDENT IN AN ORGANIZED HEALTH CARE EDUCATION/TRAINING PROGRAM

## 2023-02-12 PROCEDURE — 25000003 PHARM REV CODE 250: Performed by: STUDENT IN AN ORGANIZED HEALTH CARE EDUCATION/TRAINING PROGRAM

## 2023-02-12 PROCEDURE — 85610 PROTHROMBIN TIME: CPT | Performed by: STUDENT IN AN ORGANIZED HEALTH CARE EDUCATION/TRAINING PROGRAM

## 2023-02-12 PROCEDURE — 85025 COMPLETE CBC W/AUTO DIFF WBC: CPT | Performed by: STUDENT IN AN ORGANIZED HEALTH CARE EDUCATION/TRAINING PROGRAM

## 2023-02-12 PROCEDURE — 80053 COMPREHEN METABOLIC PANEL: CPT | Performed by: STUDENT IN AN ORGANIZED HEALTH CARE EDUCATION/TRAINING PROGRAM

## 2023-02-12 PROCEDURE — 84100 ASSAY OF PHOSPHORUS: CPT | Performed by: STUDENT IN AN ORGANIZED HEALTH CARE EDUCATION/TRAINING PROGRAM

## 2023-02-12 PROCEDURE — 12000002 HC ACUTE/MED SURGE SEMI-PRIVATE ROOM

## 2023-02-12 RX ORDER — MAGNESIUM SULFATE HEPTAHYDRATE 40 MG/ML
2 INJECTION, SOLUTION INTRAVENOUS ONCE
Status: COMPLETED | OUTPATIENT
Start: 2023-02-12 | End: 2023-02-12

## 2023-02-12 RX ORDER — POTASSIUM CHLORIDE 20 MEQ/1
40 TABLET, EXTENDED RELEASE ORAL ONCE
Status: COMPLETED | OUTPATIENT
Start: 2023-02-12 | End: 2023-02-12

## 2023-02-12 RX ORDER — FOLIC ACID 1 MG/1
1 TABLET ORAL DAILY
Qty: 30 TABLET | Refills: 2 | Status: ON HOLD | OUTPATIENT
Start: 2023-02-13 | End: 2023-08-01

## 2023-02-12 RX ORDER — LACTULOSE 10 G/15ML
15 SOLUTION ORAL 3 TIMES DAILY
Status: DISCONTINUED | OUTPATIENT
Start: 2023-02-12 | End: 2023-02-13

## 2023-02-12 RX ORDER — LACTULOSE 10 G/15ML
20 SOLUTION ORAL; RECTAL 3 TIMES DAILY
Qty: 3784 ML | Refills: 3 | Status: SHIPPED | OUTPATIENT
Start: 2023-02-12 | End: 2023-02-14 | Stop reason: HOSPADM

## 2023-02-12 RX ORDER — LANOLIN ALCOHOL/MO/W.PET/CERES
100 CREAM (GRAM) TOPICAL DAILY
Qty: 30 TABLET | Refills: 2 | Status: SHIPPED | OUTPATIENT
Start: 2023-02-13 | End: 2023-03-03 | Stop reason: SDUPTHER

## 2023-02-12 RX ADMIN — ARIPIPRAZOLE 10 MG: 5 TABLET ORAL at 08:02

## 2023-02-12 RX ADMIN — POTASSIUM CHLORIDE 40 MEQ: 1500 TABLET, EXTENDED RELEASE ORAL at 04:02

## 2023-02-12 RX ADMIN — PANTOPRAZOLE SODIUM 40 MG: 40 TABLET, DELAYED RELEASE ORAL at 08:02

## 2023-02-12 RX ADMIN — HYDROXYZINE HYDROCHLORIDE 50 MG: 25 TABLET, FILM COATED ORAL at 08:02

## 2023-02-12 RX ADMIN — MAGNESIUM SULFATE 2 G: 2 INJECTION INTRAVENOUS at 12:02

## 2023-02-12 RX ADMIN — LEVETIRACETAM 1000 MG: 500 TABLET, FILM COATED ORAL at 08:02

## 2023-02-12 RX ADMIN — LACTULOSE 10 G: 20 SOLUTION ORAL at 08:02

## 2023-02-12 RX ADMIN — LACTULOSE 15 G: 20 SOLUTION ORAL at 04:02

## 2023-02-12 RX ADMIN — FOLIC ACID 1 MG: 1 TABLET ORAL at 08:02

## 2023-02-12 RX ADMIN — THIAMINE HCL TAB 100 MG 100 MG: 100 TAB at 08:02

## 2023-02-12 RX ADMIN — THERA TABS 1 TABLET: TAB at 08:02

## 2023-02-12 RX ADMIN — AMOXICILLIN 875 MG: 875 TABLET, COATED ORAL at 08:02

## 2023-02-12 RX ADMIN — SODIUM BICARBONATE 1300 MG: 650 TABLET ORAL at 08:02

## 2023-02-12 RX ADMIN — LACTULOSE 15 G: 20 SOLUTION ORAL at 08:02

## 2023-02-12 RX ADMIN — LITHIUM CARBONATE 300 MG: 300 TABLET, FILM COATED, EXTENDED RELEASE ORAL at 08:02

## 2023-02-12 NOTE — PROGRESS NOTES
[facility] ([address] Medicine  Progress Note    Patient Name: Marely Hamilton  MRN: [id number]  Patient Class: IP- Inpatient   Admission Date: 2/7/2023  Length of Stay: 5 days  Attending Physician: Seth Noyola MD  Primary Care Provider: Viktor Ross MD        Subjective:     Principal Problem:Hepatic encephalopathy        HPI:  Ms. Hamilton is a 57 yoF with a h/o alcoholic cirrhosis, hepatic encephalopathy, seizure, bipolar disorder who present with altered mental status.  History taken from chart review given patient only able to state that she is confused.  Per report, EMS was called by patient's sister given concern for worsening mental status.  Sister also states patient has become less able take care of herself and manage her medications.  Believes patient has not taking her lactulose in over a week.    In the ED, VSS.  Labs notable for creatinine 0.4, hemoglobin 12.5, bilirubin 3.1, platelets 59, INR 1.6, ammonia 131.  CT head unremarkable.          Overview/Hospital Course:  2/9 K and P replaced. UA + UC pending. stared on IV ceftriaxone . CX ray -No significant intrathoracic abnormality.and BCx 2 . sono abdomen with no ascitis  2/10  SVT 160s overnight ,asymptomatic improved with  1L NS bolus initiated and carotid massage . converted to NSR on EKG. K, MG and P replaced. 9 episodes of BMs yesterday. lactulose held. severe metabolic acidosis likely from diarrhea. started on sodium bicarbonate drip . UC - GRAM NEGATIVE BILLY >100,000 cfu/ml Identification and susceptibility pending .ENTEROCOCCUS SPECIES > 100,000 cfu/ml dentification and susceptibility pending. started on amoxicillin  2/11 BCX 2 NGTD. E coli sensitive to ceftriaxone and Enterococcus sensitive to amoxicillin. Ammonia trended down to 58. Bicarb at 21. Saldaña cath discontinued. resumed lactulose at  10g TID  2/12  Mg and K replaced . 2BMs yesterday. lactulose increased to 15g TID          Review of Systems:   Pain  scale:   Unable to perform ROS: Mental status change   Constitutional:  fever,  chills, headache, vision loss, hearing loss, weight loss, Generalized weakness, falls, loss of smell, loss of taste, poor appetite,  sore throat, epistaxis- resolved  Respiratory: cough, shortness of breath.   Cardiovascular: chest pain, dizziness, palpitations, orthopnea, swelling of feet, syncope  Gastrointestinal: nausea, vomiting, abdominal pain- suprapubic -improved , diarrhea -improved , black stool,  blood in stool, change in bowel habits  Genitourinary: hematuria, dysuria, urgency, frequency  Integument/Breast: rash,  pruritis  Hematologic/Lymphatic: easy bruising, lymphadenopathy  Musculoskeletal: arthralgias , myalgias, back pain, neck pain, knee pain  Neurological: confusion, seizures, tremors, slurred speech  Behavioral/Psych:  depression, anxiety, auditory or visual hallucinations     OBJECTIVE:     Physical Exam:  Body mass index is 22.85 kg/m².    Constitutional: Appears well-developed and well-nourished.   Head: Normocephalic and atraumatic.   Neck: Normal range of motion. Neck supple.   Cardiovascular: Normal heart rate.  Regular heart rhythm.  Pulmonary/Chest: Effort normal.   Abdominal: No distension.  + suprapubic  tenderness- improved  Musculoskeletal: Normal range of motion. No edema.   Neurological: Alert and oriented to person, place, month  follows simple commands  Skin: Skin is warm and dry.   Psychiatric: Normal mood and affect. Behavior is normal.                  Vital Signs  Temp: 98.5 °F (36.9 °C) (02/12/23 1210)  Pulse: 82 (02/12/23 1450)  Resp: 15 (02/12/23 1210)  BP: (Abnormal) 109/56 (02/12/23 1210)  SpO2: 95 % (02/12/23 1450)       24 Hour VS Range    Temp:  [98.5 °F (36.9 °C)-99.4 °F (37.4 °C)]   Pulse:  [74-89]   Resp:  [13-23]   BP: (100-124)/(51-59)   SpO2:  [94 %-97 %]     Intake/Output Summary (Last 24 hours) at 2/12/2023 2819  Last data filed at 2/12/2023 0956  Gross per 24 hour   Intake 710 ml    Output 400 ml   Net 310 ml         I/O This Shift:  I/O this shift:  In: 240 [P.O.:240]  Out: 0     Wt Readings from Last 3 Encounters:   02/09/23 58.5 kg (129 lb)   01/27/23 58.9 kg (129 lb 13.6 oz)   11/02/21 58.9 kg (129 lb 13.6 oz)       I have personally reviewed the vitals and recorded Intake/Output     Laboratory/Diagnostic Data:    CBC/Anemia Labs: Coags:    Recent Labs   Lab 02/10/23  0436 02/11/23  0451 02/12/23  0241   WBC 5.12 3.20* 2.38*   HGB 11.2* 9.9* 9.1*   HCT 33.6* 29.7* 27.7*   PLT 93* 75* 60*   MCV 99* 101* 104*   RDW 16.2* 16.3* 16.3*   FOLATE 15.1  --   --    [id number] 944  --   --     Recent Labs   Lab 02/07/23  1424 02/08/23  0338 02/10/23  0436 02/11/23  0451 02/12/23  0241   INR 1.6*   < > 1.6* 1.7* 1.7*   APTT 32.3*  --   --   --   --     < > = values in this interval not displayed.        Chemistries: ABG:   Recent Labs   Lab 02/10/23  0437 02/10/23  1609 02/11/23  0451 02/12/23  0241    134* 138 134*   K 3.3* 4.6 3.9 3.6   * 111* 109 105   CO2 11* 18* 21* 20*   BUN 5* 3* 4* 4*   CREATININE 0.6 0.6 0.6 0.5   CALCIUM 9.5 8.0* 8.4* 8.4*   PROT 5.5*  --  4.7* 4.3*   BILITOT 3.0*  --  3.1* 2.7*   ALKPHOS 100  --  104 95   ALT 17  --  19 19   AST 44*  --  46* 47*   MG 1.6  --  1.8 1.5*   PHOS 2.1* 2.2* 3.1 3.0    Recent Labs   Lab 02/10/23  0537   PH 7.456*   PCO2 23.6*   PO2 117*   HCO3 16.6*   POCSATURATED 99   BE -7        POCT Glucose: HbA1c:    Recent Labs   Lab 02/10/23  0504   POCTGLUCOSE 114*    Hemoglobin A1C   Date Value Ref Range Status   01/22/2021 4.1 4.0 - 5.6 % Final     Comment:     ADA Screening Guidelines:  5.7-6.4%  Consistent with prediabetes  >or=6.5%  Consistent with diabetes  High levels of fetal hemoglobin interfere with the HbA1C  assay. Heterozygous hemoglobin variants (HbS, HgC, etc)do  not significantly interfere with this assay.   However, presence of multiple variants may affect accuracy.     12/10/2020 4.6 4.0 - 5.6 % Final     Comment:     ADA  Screening Guidelines:  5.7-6.4%  Consistent with prediabetes  >or=6.5%  Consistent with diabetes  High levels of fetal hemoglobin interfere with the HbA1C  assay. Heterozygous hemoglobin variants (HbS, HgC, etc)do  not significantly interfere with this assay.   However, presence of multiple variants may affect accuracy.     05/16/2020 4.1 4.0 - 5.6 % Final     Comment:     ADA Screening Guidelines:  5.7-6.4%  Consistent with prediabetes  >or=6.5%  Consistent with diabetes  High levels of fetal hemoglobin interfere with the HbA1C  assay. Heterozygous hemoglobin variants (HbS, HgC, etc)do  not significantly interfere with this assay.   However, presence of multiple variants may affect accuracy.          Cardiac Enzymes: Ejection Fractions:    No results for input(s): CPK, CPKMB, MB, TROPONINI in the last 72 hours. No results found for: EF       No results for input(s): COLORU, APPEARANCEUA, PHUR, SPECGRAV, PROTEINUA, GLUCUA, KETONESU, BILIRUBINUA, OCCULTUA, NITRITE, UROBILINOGEN, LEUKOCYTESUR, RBCUA, WBCUA, BACTERIA, SQUAMEPITHEL, HYALINECASTS in the last 48 hours.    Invalid input(s): WRIGHTSUR      Lactate (Lactic Acid) (mmol/L)   Date Value   11/02/2021 1.8   01/11/2021 2.0   01/15/2017 2.3 (H)     No results found for: BNP  No results found for: CRP, SEDRATE  No results found for: DDIMER  Ferritin (ng/mL)   Date Value   10/23/2015 412 (H)   09/19/2015 599 (H)   09/19/2015 599 (H)   07/23/2015 551 (H)   06/17/2015 612 (H)     LD (U/L)   Date Value   09/19/2015 280 (H)     Troponin I (ng/mL)   Date Value   01/25/2023 <0.006     CPK (U/L)   Date Value   01/25/2023 27   06/14/2020 23 (L)     No results found for this or any previous visit.  SARS-CoV-2 RNA, Amplification, Qual (no units)   Date Value   11/02/2021 Negative   01/21/2021 Negative   01/11/2021 Negative   11/21/2020 Negative   06/16/2020 Negative       Microbiology labs for the last week  Microbiology Results (last 7 days)       Procedure Component Value  "Units Date/Time    Urine culture [[id number]]  (Abnormal)  (Susceptibility) Collected: 02/08/23 1230    Order Status: Completed Specimen: Urine Updated: 02/12/23 1058     Urine Culture, Routine ESCHERICHIA COLI  >100,000 cfu/ml        ENTEROCOCCUS FAECALIS  > 100,000 cfu/ml      Narrative:      Specimen Source->Urine    Blood culture [[id number]] Collected: 02/09/23 0847    Order Status: Completed Specimen: Blood Updated: 02/12/23 1012     Blood Culture, Routine No Growth to date      No Growth to date      No Growth to date      No Growth to date    Blood culture [[id number]] Collected: 02/09/23 0847    Order Status: Completed Specimen: Blood Updated: 02/12/23 1012     Blood Culture, Routine No Growth to date      No Growth to date      No Growth to date      No Growth to date            Reviewed and noted in plan where applicable- Please see chart for full lab data.    Lines/Drains:       Peripheral IV - Single Lumen 02/07/23 1425 20 G Anterior;Right Upper Arm (Active)   Site Assessment Clean;Dry;Intact 02/09/23 0800   Extremity Assessment Distal to IV No swelling;No redness;No abnormal discoloration 02/09/23 0800   Line Status Saline locked 02/09/23 0800   Dressing Status Clean;Dry;Intact 02/09/23 0800   Dressing Intervention Integrity maintained 02/09/23 0800   Dressing Change Due 02/11/23 02/09/23 0800   Site Change Due 02/11/23 02/09/23 0800   Reason Not Rotated Not due 02/09/23 0800   Number of days: 2            Urethral Catheter 02/08/23 1240 16 Fr. (Active)   $ Saldaña Insertion Bedside Insertion Performed 02/09/23 1305   Site Assessment Clean;Intact 02/09/23 0800   Collection Container Urimeter 02/09/23 0800   Securement Method secured to top of thigh w/ adhesive device 02/09/23 0800   Catheter Care Performed yes 02/09/23 0800   Reason for Continuing Urinary Catheterization Urinary retention 02/09/23 0800   CAUTI Prevention Bundle Securement Device in place with 1" slack 02/09/23 0800   Output (mL) 200 mL " 02/09/23 0800   Number of days: 1       Imaging  ECG Results              EKG 12-lead (Final result)  Result time 02/07/23 15:57:30      Final result by Interface, Lab In [facility] (02/07/23 15:57:30)               Narrative:    Test Reason : R41.82,    Vent. Rate : 056 BPM     Atrial Rate : 056 BPM     P-R Int : 152 ms          QRS Dur : 082 ms      QT Int : 438 ms       P-R-T Axes : 077 036 087 degrees     QTc Int : 422 ms    Artifact  Sinus bradycardia  Nonspecific T wave abnormality  Abnormal ECG  When compared with ECG of 25-JAN-2023 11:13,  Poor data quality in current ECG precludes serial comparison    Confirmed by Ayush Hay MD ([id number]) on 2/7/2023 3:57:21 PM    Referred By: [email or web address]   SELF           Confirmed By:Ayush Hay MD                                  Results for orders placed during the hospital encounter of 11/21/20    Echo Color Flow Doppler? Yes    Interpretation Summary  · The left ventricle is normal in size with normal systolic function. The estimated ejection fraction is 55%.  · There is left ventricular concentric remodeling.  · Normal left ventricular diastolic function.  · Normal right ventricular size with normal right ventricular systolic function.  · Normal central venous pressure (3 mmHg).      X-Ray Chest 1 View  Narrative: EXAMINATION:  XR CHEST 1 VIEW    CLINICAL HISTORY:  r/o pneumonia;    TECHNIQUE:  One view    COMPARISON:  Comparison is made to 01/25/2023.    FINDINGS:  Heart size is normal, as is the appearance of the pulmonary vascularity.  Lung zones are clear, and are free of significant airspace consolidation or volume loss.  No pleural fluid.  No hilar or mediastinal mass lesion.  No pneumothorax.  Impression: No significant intrathoracic abnormality.  No significant detrimental interval change in the appearance of the chest since 01/25/2023 is appreciated.    Electronically signed by: Americo Reyes MD  Date:    02/09/2023  Time:    11:12  US Abdomen Limited  Narrative:  EXAMINATION:  US ABDOMEN LIMITED    CLINICAL HISTORY:  r/o ascitis;    TECHNIQUE:  Limited ultrasound evaluation of the abdomen was performed to evaluate for ascites.    COMPARISON:  Abdominal ultrasound 01/25/2023.  CT abdomen pelvis 01/21/2021.    FINDINGS:  Limited sonographic evaluation of the all 4 abdominal quadrants demonstrates no significant ascites.    Redemonstrated dilated varices within the right lower quadrant.  Impression: No significant ascites.    Electronically signed by resident: Wilian Daily  Date:    02/09/2023  Time:    10:15    Electronically signed by: Eriberto Holliday  Date:    02/09/2023  Time:    10:49      Labs, Imaging, EKG and Diagnostic results from 2/12/2023 were reviewed.    Medications:  Medication list was reviewed and changes noted under Assessment/Plan.  No current facility-administered medications on file prior to encounter.     Current Outpatient Medications on File Prior to Encounter   Medication Sig Dispense Refill    ARIPiprazole (ABILIFY) 10 MG Tab Take 10 mg by mouth nightly.      furosemide (LASIX) 20 MG tablet Take 0.5 tablets (10 mg total) by mouth 2 (two) times daily. 30 tablet 1    hydrOXYzine (ATARAX) 50 MG tablet Take 50 mg by mouth every evening.      lactulose (CHRONULAC) 10 gram/15 mL solution Take 45 mLs (30 g total) by mouth 3 (three) times daily. 3784 mL 3    levETIRAcetam (KEPPRA) 500 MG Tab Take 1,000 mg by mouth 2 (two) times daily.      lithium (LITHOTAB) 300 mg tablet Take 300 mg by mouth nightly.      mometasone (ELOCON) 0.1 % ointment Apply topically.      multivitamin Tab Take 1 tablet by mouth once daily.      pantoprazole (PROTONIX) 40 MG tablet Take 40 mg by mouth once daily.      propranoloL (INDERAL) 20 MG tablet Take 20 mg by mouth once daily.      QUEtiapine (SEROQUEL) 100 MG Tab Take 100 mg by mouth every evening.      spironolactone (ALDACTONE) 25 MG tablet Take 25 mg by mouth once daily.       Scheduled Medications:  amoxicillin, 875 mg, Oral,  Q12H  ARIPiprazole, 10 mg, Oral, Nightly  folic acid, 1 mg, Oral, Daily  hydrOXYzine, 50 mg, Oral, QHS  lactulose, 15 g, Oral, TID  levETIRAcetam, 1,000 mg, Oral, BID  lithium, 300 mg, Oral, QHS  multivitamin, 1 tablet, Oral, Daily  pantoprazole, 40 mg, Oral, Daily  potassium chloride, 40 mEq, Oral, Once  thiamine, 100 mg, Oral, Daily      PRN: acetaminophen, melatonin, naloxone, ondansetron, polyethylene glycol, prochlorperazine, sodium chloride 0.9%  Infusions:       Estimated Creatinine Clearance: 103.9 mL/min (based on SCr of 0.5 mg/dL).     Assessment/Plan:      * Hepatic encephalopathy  See above  Patient with hepatic enephalopathy  Recent Labs   Lab 02/11/23  0451   AMMONIA 58*     continue lactulose TID with goal of 3-4 bowel movements daily.  2/9 Alert and oriented to person, place. follows simple commands  2/11 BCX 2 NGTD. Ammonia trended down to 58  resumed lactulose at  10g TID    Metabolic acidosis   2/10 9 episodes of BMs yesterday. lactulose held. severe metabolic acidosis likely from diarrhea. \  likely from CKD/AYESHA . patient with bicarbonate   Recent Labs   Lab 02/10/23  1609 02/11/23  0451 02/12/23  0241   CO2 18* 21* 20*    .started on bicarbonate drip. monitor   2/11 off bicarbonate drip. continue sodium bicarbonate    Diarrhea  2/10. 9 episodes of BMs yesterday. lactulose held.   2/11 resolved.  resumed lactulose at  10g TID    SVT (supraventricular tachycardia)  2/19  SVT 160s overnight ,asymptomatic improved with  1L NS bolus initiated and carotid massage . converted to NSR on EKG.      Elevated INR  patient with INR   Recent Labs   Lab 02/10/23  0436 02/11/23  0451 02/12/23  0241   INR 1.6* 1.7* 1.7*   . INR is supratherapeutic. secondary to coagulopathy from liver disease.monitor      Hypophosphatemia  replaced      Hypokalemia    replaced     UTI (urinary tract infection)  2/9  UA + UC pending. stared on IV ceftriaxone .   2/10 . UC - GRAM NEGATIVE BLILY >100,000 cfu/ml Identification and  susceptibility pending .ENTEROCOCCUS SPECIES > 100,000 cfu/ml dentification and susceptibility pending. started on amoxicillin  2/12 E coli sensitive to ceftriaxone and Enterococcus sensitive to amoxicillin      Bipolar 1 disorder, depressed, severe  - Continue home lithium  - Continue home Abilify  - Holding home Seroquel    Malnutrition  Nutrition consulted      Anemia    Patient's with macrocytic anemia.. Hemoglobin stable. Etiology likely due to chronic disease . B12 and folate levels   Current CBC reviewed-    Recent Labs   Lab 02/10/23  0436 02/11/23  0451 02/12/23  0241   HGB 11.2* 9.9* 9.1*     Monitor CBC and transfuse if H/H drops below 7/21.       History of seizure  Continue home Keppra      Thrombocytopenia  Secondary to cirrhosis   Trend CBC  2/9     Patient with thrombocytopenia   Recent Labs   Lab 02/10/23  0436 02/11/23  0451 02/12/23  0241   PLT 93* 75* 60*   . Platelet counts stable.monitor    Cirrhosis, Laennec's  MELD-Na score: 16 at 2/9/2023  2:23 AM  MELD score: 16 at 2/9/2023  2:23 AM  Calculated from:  Serum Creatinine: 0.8 mg/dL (Using min of 1 mg/dL) at 2/9/2023  2:22 AM  Serum Sodium: 138 mmol/L (Using max of 137 mmol/L) at 2/9/2023  2:22 AM  Total Bilirubin: 3.0 mg/dL at 2/9/2023  2:22 AM  INR(ratio): 1.6 at 2/9/2023  2:23 AM  Age: 57 years     - Lactulose 4 times daily  - Holding home Lasix given concern for dehydration  - Continue spironolactone   - Holding home propranolol given bradycardia  - Low-sodium diet, fluid restriction 1500 mL  - Nutrition consulted  - Mentation improving  - PT/OT      VTE Risk Mitigation (From admission, onward)           Ordered     IP VTE LOW RISK PATIENT  Once         02/07/23 1635     Place sequential compression device  Until discontinued         02/07/23 1635                    Discharge Planning   BRAYAN:      Code Status: Full Code   Is the patient medically ready for discharge?: No    Reason for patient still in hospital (select all that apply):  Treatment  Discharge Plan A: Skilled Nursing Facility   Discharge Delays: None known at this time              Seth Noyola MD  Department of Hospital Medicine   [facility] - Intensive Care ([place]-16)

## 2023-02-12 NOTE — PLAN OF CARE
Problem: Adult Inpatient Plan of Care  Goal: Plan of Care Review  Outcome: Ongoing, Progressing  Goal: Patient-Specific Goal (Individualized)  Outcome: Ongoing, Progressing  Goal: Absence of Hospital-Acquired Illness or Injury  Outcome: Ongoing, Progressing  Pt able to express needs.  No c/o pain.  Wants to speak with team this morning regarding discharge plans to go to [place] to live with her sister.  Voiding independently via pure wick/diaper.  Safety maintained.  Bed in low position,  call  light in reach.

## 2023-02-13 LAB
ALBUMIN SERPL BCP-MCNC: 2.2 G/DL (ref 3.5–5.2)
ALP SERPL-CCNC: 129 U/L (ref 55–135)
ALT SERPL W/O P-5'-P-CCNC: 20 U/L (ref 10–44)
ANION GAP SERPL CALC-SCNC: 5 MMOL/L (ref 8–16)
AST SERPL-CCNC: 44 U/L (ref 10–40)
BASOPHILS # BLD AUTO: 0.01 K/UL (ref 0–0.2)
BASOPHILS NFR BLD: 0.5 % (ref 0–1.9)
BILIRUB SERPL-MCNC: 2.1 MG/DL (ref 0.1–1)
BUN SERPL-MCNC: 6 MG/DL (ref 6–20)
CALCIUM SERPL-MCNC: 8.7 MG/DL (ref 8.7–10.5)
CHLORIDE SERPL-SCNC: 110 MMOL/L (ref 95–110)
CO2 SERPL-SCNC: 21 MMOL/L (ref 23–29)
CREAT SERPL-MCNC: 0.6 MG/DL (ref 0.5–1.4)
DIFFERENTIAL METHOD: ABNORMAL
EOSINOPHIL # BLD AUTO: 0.1 K/UL (ref 0–0.5)
EOSINOPHIL NFR BLD: 3 % (ref 0–8)
ERYTHROCYTE [DISTWIDTH] IN BLOOD BY AUTOMATED COUNT: 15.9 % (ref 11.5–14.5)
EST. GFR  (NO RACE VARIABLE): >60 ML/MIN/1.73 M^2
GLUCOSE SERPL-MCNC: 132 MG/DL (ref 70–110)
HCT VFR BLD AUTO: 28.5 % (ref 37–48.5)
HGB BLD-MCNC: 9.4 G/DL (ref 12–16)
IMM GRANULOCYTES # BLD AUTO: 0.01 K/UL (ref 0–0.04)
IMM GRANULOCYTES NFR BLD AUTO: 0.5 % (ref 0–0.5)
INR PPP: 1.5 (ref 0.8–1.2)
LYMPHOCYTES # BLD AUTO: 0.7 K/UL (ref 1–4.8)
LYMPHOCYTES NFR BLD: 32 % (ref 18–48)
MAGNESIUM SERPL-MCNC: 1.8 MG/DL (ref 1.6–2.6)
MCH RBC QN AUTO: 33.2 PG (ref 27–31)
MCHC RBC AUTO-ENTMCNC: 33 G/DL (ref 32–36)
MCV RBC AUTO: 101 FL (ref 82–98)
MONOCYTES # BLD AUTO: 0.2 K/UL (ref 0.3–1)
MONOCYTES NFR BLD: 8.4 % (ref 4–15)
NEUTROPHILS # BLD AUTO: 1.1 K/UL (ref 1.8–7.7)
NEUTROPHILS NFR BLD: 55.6 % (ref 38–73)
NRBC BLD-RTO: 0 /100 WBC
PHOSPHATE SERPL-MCNC: 2.7 MG/DL (ref 2.7–4.5)
PLATELET # BLD AUTO: 58 K/UL (ref 150–450)
PMV BLD AUTO: 10 FL (ref 9.2–12.9)
POTASSIUM SERPL-SCNC: 4 MMOL/L (ref 3.5–5.1)
PROT SERPL-MCNC: 4.5 G/DL (ref 6–8.4)
PROTHROMBIN TIME: 15.8 SEC (ref 9–12.5)
RBC # BLD AUTO: 2.83 M/UL (ref 4–5.4)
SODIUM SERPL-SCNC: 136 MMOL/L (ref 136–145)
WBC # BLD AUTO: 2.03 K/UL (ref 3.9–12.7)

## 2023-02-13 PROCEDURE — 85025 COMPLETE CBC W/AUTO DIFF WBC: CPT | Performed by: STUDENT IN AN ORGANIZED HEALTH CARE EDUCATION/TRAINING PROGRAM

## 2023-02-13 PROCEDURE — 99232 PR SUBSEQUENT HOSPITAL CARE,LEVL II: ICD-10-PCS | Mod: ,,, | Performed by: HOSPITALIST

## 2023-02-13 PROCEDURE — 83735 ASSAY OF MAGNESIUM: CPT | Performed by: STUDENT IN AN ORGANIZED HEALTH CARE EDUCATION/TRAINING PROGRAM

## 2023-02-13 PROCEDURE — 80053 COMPREHEN METABOLIC PANEL: CPT | Performed by: STUDENT IN AN ORGANIZED HEALTH CARE EDUCATION/TRAINING PROGRAM

## 2023-02-13 PROCEDURE — 12000002 HC ACUTE/MED SURGE SEMI-PRIVATE ROOM

## 2023-02-13 PROCEDURE — 25000003 PHARM REV CODE 250: Performed by: HOSPITALIST

## 2023-02-13 PROCEDURE — 85610 PROTHROMBIN TIME: CPT | Performed by: STUDENT IN AN ORGANIZED HEALTH CARE EDUCATION/TRAINING PROGRAM

## 2023-02-13 PROCEDURE — 25000003 PHARM REV CODE 250: Performed by: STUDENT IN AN ORGANIZED HEALTH CARE EDUCATION/TRAINING PROGRAM

## 2023-02-13 PROCEDURE — 97530 THERAPEUTIC ACTIVITIES: CPT | Mod: CQ

## 2023-02-13 PROCEDURE — 36415 COLL VENOUS BLD VENIPUNCTURE: CPT | Performed by: STUDENT IN AN ORGANIZED HEALTH CARE EDUCATION/TRAINING PROGRAM

## 2023-02-13 PROCEDURE — 99232 SBSQ HOSP IP/OBS MODERATE 35: CPT | Mod: ,,, | Performed by: HOSPITALIST

## 2023-02-13 PROCEDURE — 97116 GAIT TRAINING THERAPY: CPT | Mod: CQ

## 2023-02-13 PROCEDURE — 84100 ASSAY OF PHOSPHORUS: CPT | Performed by: STUDENT IN AN ORGANIZED HEALTH CARE EDUCATION/TRAINING PROGRAM

## 2023-02-13 RX ORDER — AMOXICILLIN 875 MG/1
875 TABLET, FILM COATED ORAL EVERY 12 HOURS
Status: DISCONTINUED | OUTPATIENT
Start: 2023-02-13 | End: 2023-02-14 | Stop reason: HOSPADM

## 2023-02-13 RX ORDER — LACTULOSE 10 G/15ML
20 SOLUTION ORAL 3 TIMES DAILY
Status: DISCONTINUED | OUTPATIENT
Start: 2023-02-13 | End: 2023-02-14

## 2023-02-13 RX ADMIN — LACTULOSE 15 G: 20 SOLUTION ORAL at 10:02

## 2023-02-13 RX ADMIN — HYDROXYZINE HYDROCHLORIDE 50 MG: 25 TABLET, FILM COATED ORAL at 08:02

## 2023-02-13 RX ADMIN — AMOXICILLIN 875 MG: 875 TABLET, COATED ORAL at 10:02

## 2023-02-13 RX ADMIN — THIAMINE HCL TAB 100 MG 100 MG: 100 TAB at 10:02

## 2023-02-13 RX ADMIN — ARIPIPRAZOLE 10 MG: 5 TABLET ORAL at 08:02

## 2023-02-13 RX ADMIN — LACTULOSE 15 G: 20 SOLUTION ORAL at 04:02

## 2023-02-13 RX ADMIN — THERA TABS 1 TABLET: TAB at 10:02

## 2023-02-13 RX ADMIN — LEVETIRACETAM 1000 MG: 500 TABLET, FILM COATED ORAL at 08:02

## 2023-02-13 RX ADMIN — LITHIUM CARBONATE 300 MG: 300 TABLET, FILM COATED, EXTENDED RELEASE ORAL at 08:02

## 2023-02-13 RX ADMIN — FOLIC ACID 1 MG: 1 TABLET ORAL at 10:02

## 2023-02-13 RX ADMIN — LEVETIRACETAM 1000 MG: 500 TABLET, FILM COATED ORAL at 10:02

## 2023-02-13 RX ADMIN — PANTOPRAZOLE SODIUM 40 MG: 40 TABLET, DELAYED RELEASE ORAL at 10:02

## 2023-02-13 RX ADMIN — LACTULOSE 20 G: 20 SOLUTION ORAL at 08:02

## 2023-02-13 RX ADMIN — AMOXICILLIN 875 MG: 875 TABLET, COATED ORAL at 08:02

## 2023-02-13 NOTE — PT/OT/SLP PROGRESS
Physical Therapy Treatment    Patient Name:  Marely Hamilton   MRN:  [id number]    Recommendations:     Discharge Recommendations: nursing facility, skilled  Discharge Equipment Recommendations: other (see comments)  Barriers to discharge:  currently requiring increased levels of assistance with mobility    Assessment:     Marely Hamilton is a 57 y.o. female admitted with a medical diagnosis of Hepatic encephalopathy.  She presents with the following impairments/functional limitations: weakness, impaired endurance, impaired self care skills, impaired functional mobility, gait instability, decreased safety awareness, impaired cardiopulmonary response to activity Patient agreeable to session and tolerated well. Treatment focused on progression of gait training and functional mobility independence. Patient able to achieve multiple trials of sit to stands with SBA. Gait training of distances up to 38ft with RW required CGA this date with notable muscular fatigue displayed. Patient continues to be limited by prior mentioned deficits but remains strongly motivated to return to PLOF. Further skilled therapy would be beneficial to maximize patient functional mobility independence.     Rehab Prognosis: Good; patient would benefit from acute skilled PT services to address these deficits and reach maximum level of function.    Recent Surgery: * No surgery found *      Plan:     During this hospitalization, patient to be seen 3 x/week to address the identified rehab impairments via gait training, therapeutic activities, therapeutic exercises, neuromuscular re-education and progress toward the following goals:    Plan of Care Expires:  03/09/23    Subjective     Chief Complaint: none stated  Patient/Family Comments/goals: to walk more  Pain/Comfort:  Pain Rating 1: 0/10  Pain Rating Post-Intervention 1: 0/10      Objective:     Communicated with nurse prior to session.  Patient found HOB elevated with peripheral IV, PureWick upon  PT entry to room.     General Precautions: Standard, fall  Orthopedic Precautions: N/A  Braces: N/A  Respiratory Status: Room air     Functional Mobility:  Bed Mobility:     Rolling Right: stand by assistance  Scooting: stand by assistance  Bridging: contact guard assistance  Supine to Sit: stand by assistance  Sit to Supine: stand by assistance  Transfers:     Sit to Stand:    stand by assistance with rolling walker x1 trial from EOB  Stand by assistance with rolling walker x3 trials from commode  Toilet transfer: CGA with RW and step transfer  Gait: ~20ft + 38ft with RW and CGA.   Toileting completed between trials  Increased knee flexion in stance phase bilaterally  Decreased alex  Decreased foot clearance and step length bilaterally      AM-PAC 6 CLICK MOBILITY  Turning over in bed (including adjusting bedclothes, sheets and blankets)?: 4  Sitting down on and standing up from a chair with arms (e.g., wheelchair, bedside commode, etc.): 4  Moving from lying on back to sitting on the side of the bed?: 3  Moving to and from a bed to a chair (including a wheelchair)?: 3  Need to walk in hospital room?: 3  Climbing 3-5 steps with a railing?: 2  Basic Mobility Total Score: 19       Treatment & Education:  Patient found with soiled brief upon arrival. Patient gait trained to bathroom for pericare and changing of brief. Patient able to complete pericare with minimal assistance and donned brief with minimum assistance all in standing.   Educated patient on PTA role in established POC  Educated patient on using call bell to request assistance with functional mobility  All questions within PTA scope of practice answered.       Patient left HOB elevated with all lines intact, call button in reach, nurse notified, and telesitter present..    GOALS:   Multidisciplinary Problems       Physical Therapy Goals          Problem: Physical Therapy    Goal Priority Disciplines Outcome Goal Variances Interventions   Physical  Therapy Goal     PT, PT/OT Ongoing, Progressing     Description: Goals to be met by: 2023     Patient will increase functional independence with mobility by performin. Supine to sit with Casper  2. Sit to supine with Casper  3. Sit to stand transfer with Supervision  4. Bed to chair transfer with Supervision using LRAD  5. Gait  x 150 feet with Supervision using LRAD.   6. Ascend/descend 1 stair with no Handrails Stand-by Assistance using LRAD.                          Time Tracking:     PT Received On: 23  PT Start Time: 830     PT Stop Time: 0855  PT Total Time (min): 25 min     Billable Minutes: Gait Training 15 and Therapeutic Activity 10    Treatment Type: Treatment  PT/PTA: PTA     PTA Visit Number: 2     2023

## 2023-02-13 NOTE — PLAN OF CARE
Problem: Adult Inpatient Plan of Care  Goal: Plan of Care Review  Outcome: Ongoing, Progressing  Goal: Patient-Specific Goal (Individualized)  Outcome: Ongoing, Progressing  Goal: Absence of Hospital-Acquired Illness or Injury  Outcome: Ongoing, Progressing  Goal: Optimal Comfort and Wellbeing  Outcome: Ongoing, Progressing  Goal: Readiness for Transition of Care  Outcome: Ongoing, Progressing     Problem: Skin Injury Risk Increased  Goal: Skin Health and Integrity  Outcome: Ongoing, Progressing     Problem: Infection  Goal: Absence of Infection Signs and Symptoms  Outcome: Ongoing, Progressing     Problem: Fluid Volume Excess  Goal: Fluid Balance  Outcome: Ongoing, Progressing     Problem: Urinary Retention  Goal: Effective Urinary Elimination  Outcome: Ongoing, Progressing

## 2023-02-13 NOTE — PLAN OF CARE
Problem: Fluid Volume Excess  Goal: Fluid Balance  Outcome: Ongoing, Progressing     Problem: Urinary Retention  Goal: Effective Urinary Elimination  Outcome: Ongoing, Progressing    Pt AAO X 4; able to express needs.  No c/o pain.  Resting quietly.  Currently asking about discharge plans.  Skin intact.  Safety maintained.  Bed in low position,  call  light in reach.

## 2023-02-13 NOTE — PLAN OF CARE
02/13/23 1446   Post-Acute Status   Post-Acute Authorization Placement   Post-Acute Placement Status Pending post-acute provider review/more information requested   Discharge Delays None known at this time   Discharge Plan   Discharge Plan A Skilled Nursing Facility   Discharge Plan B Home Health     SW spoke with with the pt as the SW was told in huddle that the pt may be going to her sisters in [place] at [place].  SW met with the pt do discuss this and she asked the SW to call her sister.  SW called her sister, Zaria.  She stated that the plan is for the pt to either go to SNF or home with HH and sitters.  Her first choice for SNF is [facility]. Pt has been denied by [facility], [facility], and [facility].  Pt is still being reviewed by [facility], [facility], [facility], and [facility].  SW will f/u as needed.    Maya Martinez LCSW   PRN

## 2023-02-14 ENCOUNTER — HOSPITAL ENCOUNTER (INPATIENT)
Facility: HOSPITAL | Age: 58
LOS: 14 days | Discharge: HOME | DRG: 433 | End: 2023-02-28
Attending: HOSPITALIST | Admitting: HOSPITALIST
Payer: MEDICARE

## 2023-02-14 VITALS
HEIGHT: 63 IN | DIASTOLIC BLOOD PRESSURE: 52 MMHG | SYSTOLIC BLOOD PRESSURE: 108 MMHG | HEART RATE: 73 BPM | TEMPERATURE: 99 F | BODY MASS INDEX: 22.86 KG/M2 | RESPIRATION RATE: 18 BRPM | WEIGHT: 129 LBS | OXYGEN SATURATION: 92 %

## 2023-02-14 DIAGNOSIS — K70.30 CIRRHOSIS, LAENNEC'S: Primary | ICD-10-CM

## 2023-02-14 DIAGNOSIS — K76.82 HEPATIC ENCEPHALOPATHY: ICD-10-CM

## 2023-02-14 LAB
ALBUMIN SERPL BCP-MCNC: 2.3 G/DL (ref 3.5–5.2)
ALP SERPL-CCNC: 110 U/L (ref 55–135)
ALT SERPL W/O P-5'-P-CCNC: 20 U/L (ref 10–44)
ANION GAP SERPL CALC-SCNC: 7 MMOL/L (ref 8–16)
ANISOCYTOSIS BLD QL SMEAR: SLIGHT
AST SERPL-CCNC: 45 U/L (ref 10–40)
BACTERIA BLD CULT: NORMAL
BACTERIA BLD CULT: NORMAL
BASOPHILS NFR BLD: 1 % (ref 0–1.9)
BILIRUB SERPL-MCNC: 2.5 MG/DL (ref 0.1–1)
BUN SERPL-MCNC: 5 MG/DL (ref 6–20)
BURR CELLS BLD QL SMEAR: ABNORMAL
CALCIUM SERPL-MCNC: 8.7 MG/DL (ref 8.7–10.5)
CHLORIDE SERPL-SCNC: 108 MMOL/L (ref 95–110)
CO2 SERPL-SCNC: 20 MMOL/L (ref 23–29)
CREAT SERPL-MCNC: 0.5 MG/DL (ref 0.5–1.4)
DIFFERENTIAL METHOD: ABNORMAL
EOSINOPHIL NFR BLD: 6 % (ref 0–8)
ERYTHROCYTE [DISTWIDTH] IN BLOOD BY AUTOMATED COUNT: 16.4 % (ref 11.5–14.5)
EST. GFR  (NO RACE VARIABLE): >60 ML/MIN/1.73 M^2
GLUCOSE SERPL-MCNC: 86 MG/DL (ref 70–110)
HCT VFR BLD AUTO: 30.4 % (ref 37–48.5)
HGB BLD-MCNC: 9.6 G/DL (ref 12–16)
HYPOCHROMIA BLD QL SMEAR: ABNORMAL
IMM GRANULOCYTES # BLD AUTO: ABNORMAL K/UL (ref 0–0.04)
IMM GRANULOCYTES NFR BLD AUTO: ABNORMAL % (ref 0–0.5)
INR PPP: 1.6 (ref 0.8–1.2)
LYMPHOCYTES NFR BLD: 14 % (ref 18–48)
MAGNESIUM SERPL-MCNC: 1.6 MG/DL (ref 1.6–2.6)
MCH RBC QN AUTO: 33.7 PG (ref 27–31)
MCHC RBC AUTO-ENTMCNC: 31.6 G/DL (ref 32–36)
MCV RBC AUTO: 107 FL (ref 82–98)
MONOCYTES NFR BLD: 5 % (ref 4–15)
NEUTROPHILS NFR BLD: 74 % (ref 38–73)
NRBC BLD-RTO: 0 /100 WBC
OVALOCYTES BLD QL SMEAR: ABNORMAL
PHOSPHATE SERPL-MCNC: 2.7 MG/DL (ref 2.7–4.5)
PLATELET # BLD AUTO: 50 K/UL (ref 150–450)
PLATELET BLD QL SMEAR: ABNORMAL
PMV BLD AUTO: 9.6 FL (ref 9.2–12.9)
POIKILOCYTOSIS BLD QL SMEAR: SLIGHT
POLYCHROMASIA BLD QL SMEAR: ABNORMAL
POTASSIUM SERPL-SCNC: 3.7 MMOL/L (ref 3.5–5.1)
PROT SERPL-MCNC: 4.7 G/DL (ref 6–8.4)
PROTHROMBIN TIME: 15.9 SEC (ref 9–12.5)
RBC # BLD AUTO: 2.85 M/UL (ref 4–5.4)
SARS-COV-2 RNA RESP QL NAA+PROBE: NOT DETECTED
SODIUM SERPL-SCNC: 135 MMOL/L (ref 136–145)
WBC # BLD AUTO: 1.96 K/UL (ref 3.9–12.7)

## 2023-02-14 PROCEDURE — 97535 SELF CARE MNGMENT TRAINING: CPT

## 2023-02-14 PROCEDURE — 25000003 PHARM REV CODE 250: Performed by: STUDENT IN AN ORGANIZED HEALTH CARE EDUCATION/TRAINING PROGRAM

## 2023-02-14 PROCEDURE — 80053 COMPREHEN METABOLIC PANEL: CPT | Performed by: STUDENT IN AN ORGANIZED HEALTH CARE EDUCATION/TRAINING PROGRAM

## 2023-02-14 PROCEDURE — 11000004 HC SNF PRIVATE

## 2023-02-14 PROCEDURE — 85610 PROTHROMBIN TIME: CPT | Performed by: STUDENT IN AN ORGANIZED HEALTH CARE EDUCATION/TRAINING PROGRAM

## 2023-02-14 PROCEDURE — 25000003 PHARM REV CODE 250: Performed by: HOSPITALIST

## 2023-02-14 PROCEDURE — 83735 ASSAY OF MAGNESIUM: CPT | Performed by: STUDENT IN AN ORGANIZED HEALTH CARE EDUCATION/TRAINING PROGRAM

## 2023-02-14 PROCEDURE — U0005 INFEC AGEN DETEC AMPLI PROBE: HCPCS | Performed by: HOSPITALIST

## 2023-02-14 PROCEDURE — 99239 HOSP IP/OBS DSCHRG MGMT >30: CPT | Mod: ,,, | Performed by: HOSPITALIST

## 2023-02-14 PROCEDURE — 85027 COMPLETE CBC AUTOMATED: CPT | Performed by: STUDENT IN AN ORGANIZED HEALTH CARE EDUCATION/TRAINING PROGRAM

## 2023-02-14 PROCEDURE — 85007 BL SMEAR W/DIFF WBC COUNT: CPT | Performed by: STUDENT IN AN ORGANIZED HEALTH CARE EDUCATION/TRAINING PROGRAM

## 2023-02-14 PROCEDURE — 36415 COLL VENOUS BLD VENIPUNCTURE: CPT | Performed by: STUDENT IN AN ORGANIZED HEALTH CARE EDUCATION/TRAINING PROGRAM

## 2023-02-14 PROCEDURE — U0003 INFECTIOUS AGENT DETECTION BY NUCLEIC ACID (DNA OR RNA); SEVERE ACUTE RESPIRATORY SYNDROME CORONAVIRUS 2 (SARS-COV-2) (CORONAVIRUS DISEASE [COVID-19]), AMPLIFIED PROBE TECHNIQUE, MAKING USE OF HIGH THROUGHPUT TECHNOLOGIES AS DESCRIBED BY CMS-2020-01-R: HCPCS | Performed by: HOSPITALIST

## 2023-02-14 PROCEDURE — 84100 ASSAY OF PHOSPHORUS: CPT | Performed by: STUDENT IN AN ORGANIZED HEALTH CARE EDUCATION/TRAINING PROGRAM

## 2023-02-14 PROCEDURE — 99239 PR HOSPITAL DISCHARGE DAY,>30 MIN: ICD-10-PCS | Mod: ,,, | Performed by: HOSPITALIST

## 2023-02-14 RX ORDER — PROPRANOLOL HYDROCHLORIDE 10 MG/1
20 TABLET ORAL DAILY
Status: DISCONTINUED | OUTPATIENT
Start: 2023-02-15 | End: 2023-02-28 | Stop reason: HOSPADM

## 2023-02-14 RX ORDER — FOLIC ACID 1 MG/1
1 TABLET ORAL DAILY
Status: DISCONTINUED | OUTPATIENT
Start: 2023-02-15 | End: 2023-02-28 | Stop reason: HOSPADM

## 2023-02-14 RX ORDER — LEVETIRACETAM 500 MG/1
1000 TABLET ORAL 2 TIMES DAILY
Status: DISCONTINUED | OUTPATIENT
Start: 2023-02-14 | End: 2023-02-28 | Stop reason: HOSPADM

## 2023-02-14 RX ORDER — AMOXICILLIN 875 MG/1
875 TABLET, FILM COATED ORAL EVERY 12 HOURS
Qty: 2 TABLET | Refills: 0 | Status: ON HOLD | OUTPATIENT
Start: 2023-02-14 | End: 2023-02-27 | Stop reason: HOSPADM

## 2023-02-14 RX ORDER — ACETAMINOPHEN 325 MG/1
650 TABLET ORAL EVERY 6 HOURS PRN
Status: DISCONTINUED | OUTPATIENT
Start: 2023-02-14 | End: 2023-02-28 | Stop reason: HOSPADM

## 2023-02-14 RX ORDER — SPIRONOLACTONE 25 MG/1
25 TABLET ORAL DAILY
Status: DISCONTINUED | OUTPATIENT
Start: 2023-02-15 | End: 2023-02-28 | Stop reason: HOSPADM

## 2023-02-14 RX ORDER — LITHIUM CARBONATE 300 MG/1
300 TABLET, FILM COATED, EXTENDED RELEASE ORAL NIGHTLY
Status: DISCONTINUED | OUTPATIENT
Start: 2023-02-14 | End: 2023-02-28 | Stop reason: HOSPADM

## 2023-02-14 RX ORDER — AMOXICILLIN 250 MG
1 CAPSULE ORAL 2 TIMES DAILY
Status: DISCONTINUED | OUTPATIENT
Start: 2023-02-14 | End: 2023-02-15

## 2023-02-14 RX ORDER — LACTULOSE 10 G/15ML
30 SOLUTION ORAL 3 TIMES DAILY
Status: DISCONTINUED | OUTPATIENT
Start: 2023-02-14 | End: 2023-02-28 | Stop reason: HOSPADM

## 2023-02-14 RX ORDER — THIAMINE HCL 100 MG
100 TABLET ORAL DAILY
Status: DISCONTINUED | OUTPATIENT
Start: 2023-02-15 | End: 2023-02-28 | Stop reason: HOSPADM

## 2023-02-14 RX ORDER — CALCIUM CARBONATE 200(500)MG
500 TABLET,CHEWABLE ORAL 2 TIMES DAILY PRN
Status: DISCONTINUED | OUTPATIENT
Start: 2023-02-14 | End: 2023-02-28 | Stop reason: HOSPADM

## 2023-02-14 RX ORDER — ARIPIPRAZOLE 5 MG/1
10 TABLET ORAL NIGHTLY
Status: DISCONTINUED | OUTPATIENT
Start: 2023-02-14 | End: 2023-02-28 | Stop reason: HOSPADM

## 2023-02-14 RX ORDER — SODIUM BICARBONATE 650 MG/1
1300 TABLET ORAL 2 TIMES DAILY
Qty: 120 TABLET | Refills: 11 | Status: ON HOLD | OUTPATIENT
Start: 2023-02-14 | End: 2023-05-28 | Stop reason: HOSPADM

## 2023-02-14 RX ORDER — TALC
6 POWDER (GRAM) TOPICAL NIGHTLY PRN
Status: DISCONTINUED | OUTPATIENT
Start: 2023-02-14 | End: 2023-02-28 | Stop reason: HOSPADM

## 2023-02-14 RX ORDER — LACTULOSE 10 G/15ML
30 SOLUTION ORAL 3 TIMES DAILY
Status: DISCONTINUED | OUTPATIENT
Start: 2023-02-14 | End: 2023-02-14 | Stop reason: HOSPADM

## 2023-02-14 RX ORDER — LACTULOSE 10 G/15ML
30 SOLUTION ORAL 3 TIMES DAILY
Qty: 4050 ML | Refills: 0 | Status: SHIPPED | OUTPATIENT
Start: 2023-02-14 | End: 2023-03-16

## 2023-02-14 RX ORDER — SODIUM BICARBONATE 650 MG/1
1300 TABLET ORAL 2 TIMES DAILY
Status: DISCONTINUED | OUTPATIENT
Start: 2023-02-14 | End: 2023-02-28 | Stop reason: HOSPADM

## 2023-02-14 RX ORDER — HYDROXYZINE HYDROCHLORIDE 25 MG/1
50 TABLET, FILM COATED ORAL NIGHTLY
Status: DISCONTINUED | OUTPATIENT
Start: 2023-02-14 | End: 2023-02-28 | Stop reason: HOSPADM

## 2023-02-14 RX ORDER — FUROSEMIDE 20 MG/1
20 TABLET ORAL 2 TIMES DAILY
Status: DISCONTINUED | OUTPATIENT
Start: 2023-02-14 | End: 2023-02-14

## 2023-02-14 RX ORDER — QUETIAPINE FUMARATE 25 MG/1
100 TABLET, FILM COATED ORAL NIGHTLY
Status: DISCONTINUED | OUTPATIENT
Start: 2023-02-14 | End: 2023-02-14 | Stop reason: HOSPADM

## 2023-02-14 RX ORDER — QUETIAPINE FUMARATE 25 MG/1
100 TABLET, FILM COATED ORAL EVERY EVENING
Status: DISCONTINUED | OUTPATIENT
Start: 2023-02-14 | End: 2023-02-28 | Stop reason: HOSPADM

## 2023-02-14 RX ORDER — AMOXICILLIN 875 MG/1
875 TABLET, FILM COATED ORAL EVERY 12 HOURS
Status: COMPLETED | OUTPATIENT
Start: 2023-02-14 | End: 2023-02-15

## 2023-02-14 RX ORDER — SODIUM BICARBONATE 650 MG/1
1300 TABLET ORAL 2 TIMES DAILY
Status: DISCONTINUED | OUTPATIENT
Start: 2023-02-14 | End: 2023-02-14 | Stop reason: HOSPADM

## 2023-02-14 RX ORDER — PANTOPRAZOLE SODIUM 40 MG/1
40 TABLET, DELAYED RELEASE ORAL DAILY
Status: DISCONTINUED | OUTPATIENT
Start: 2023-02-15 | End: 2023-02-28 | Stop reason: HOSPADM

## 2023-02-14 RX ADMIN — Medication 6 MG: at 03:02

## 2023-02-14 RX ADMIN — SODIUM BICARBONATE 1300 MG: 650 TABLET ORAL at 09:02

## 2023-02-14 RX ADMIN — LITHIUM CARBONATE 300 MG: 300 TABLET, FILM COATED, EXTENDED RELEASE ORAL at 09:02

## 2023-02-14 RX ADMIN — LACTULOSE 20 G: 20 SOLUTION ORAL at 09:02

## 2023-02-14 RX ADMIN — FOLIC ACID 1 MG: 1 TABLET ORAL at 09:02

## 2023-02-14 RX ADMIN — ARIPIPRAZOLE 10 MG: 5 TABLET ORAL at 09:02

## 2023-02-14 RX ADMIN — SENNOSIDES AND DOCUSATE SODIUM 1 TABLET: 50; 8.6 TABLET ORAL at 09:02

## 2023-02-14 RX ADMIN — LACTULOSE 30 G: 20 SOLUTION ORAL at 09:02

## 2023-02-14 RX ADMIN — AMOXICILLIN 875 MG: 875 TABLET, COATED ORAL at 09:02

## 2023-02-14 RX ADMIN — THERA TABS 1 TABLET: TAB at 09:02

## 2023-02-14 RX ADMIN — THIAMINE HCL TAB 100 MG 100 MG: 100 TAB at 09:02

## 2023-02-14 RX ADMIN — HYDROXYZINE HYDROCHLORIDE 50 MG: 25 TABLET, FILM COATED ORAL at 09:02

## 2023-02-14 RX ADMIN — LEVETIRACETAM 1000 MG: 500 TABLET, FILM COATED ORAL at 09:02

## 2023-02-14 RX ADMIN — PANTOPRAZOLE SODIUM 40 MG: 40 TABLET, DELAYED RELEASE ORAL at 09:02

## 2023-02-14 RX ADMIN — QUETIAPINE FUMARATE 100 MG: 25 TABLET ORAL at 06:02

## 2023-02-14 RX ADMIN — LACTULOSE 30 G: 20 SOLUTION ORAL at 04:02

## 2023-02-14 NOTE — PROGRESS NOTES
[facility] ([address] Medicine  Progress Note    Patient Name: Marely Hamilton  MRN: [id number]  Patient Class: IP- Inpatient   Admission Date: 2/7/2023  Length of Stay: 6 days  Attending Physician: Seth Noyola MD  Primary Care Provider: Viktor Ross MD        Subjective:     Principal Problem:Hepatic encephalopathy        HPI:  Ms. Hamilton is a 57 yoF with a h/o alcoholic cirrhosis, hepatic encephalopathy, seizure, bipolar disorder who present with altered mental status.  History taken from chart review given patient only able to state that she is confused.  Per report, EMS was called by patient's sister given concern for worsening mental status.  Sister also states patient has become less able take care of herself and manage her medications.  Believes patient has not taking her lactulose in over a week.    In the ED, VSS.  Labs notable for creatinine 0.4, hemoglobin 12.5, bilirubin 3.1, platelets 59, INR 1.6, ammonia 131.  CT head unremarkable.          Overview/Hospital Course:  2/9 K and P replaced. UA + UC pending. stared on IV ceftriaxone . CX ray -No significant intrathoracic abnormality.and BCx 2 . sono abdomen with no ascitis  2/10  SVT 160s overnight ,asymptomatic improved with  1L NS bolus initiated and carotid massage . converted to NSR on EKG. K, MG and P replaced. 9 episodes of BMs yesterday. lactulose held. severe metabolic acidosis likely from diarrhea. started on sodium bicarbonate drip . UC - GRAM NEGATIVE IBLLY >100,000 cfu/ml Identification and susceptibility pending .ENTEROCOCCUS SPECIES > 100,000 cfu/ml dentification and susceptibility pending. started on amoxicillin  2/11 BCX 2 NGTD. E coli sensitive to ceftriaxone and Enterococcus sensitive to amoxicillin. Ammonia trended down to 58. Bicarb at 21. Saldaña cath discontinued. resumed lactulose at  10g TID  2/12  Mg and K replaced . 2BMs yesterday.   2/13 lactulose increased to 20g TID          Review of Systems:    Pain scale:   Unable to perform ROS: Mental status change   Constitutional:  fever,  chills, headache, vision loss, hearing loss, weight loss, Generalized weakness, falls, loss of smell, loss of taste, poor appetite,  sore throat, epistaxis- resolved  Respiratory: cough, shortness of breath.   Cardiovascular: chest pain, dizziness, palpitations, orthopnea, swelling of feet, syncope  Gastrointestinal: nausea, vomiting, abdominal pain- suprapubic -improved , diarrhea -improved , black stool,  blood in stool, change in bowel habits  Genitourinary: hematuria, dysuria, urgency, frequency  Integument/Breast: rash,  pruritis  Hematologic/Lymphatic: easy bruising, lymphadenopathy  Musculoskeletal: arthralgias , myalgias, back pain, neck pain, knee pain  Neurological: confusion, seizures, tremors, slurred speech  Behavioral/Psych:  depression, anxiety, auditory or visual hallucinations     OBJECTIVE:     Physical Exam:  Body mass index is 22.85 kg/m².    Constitutional: Appears well-developed and well-nourished.   Head: Normocephalic and atraumatic.   Neck: Normal range of motion. Neck supple.   Cardiovascular: Normal heart rate.  Regular heart rhythm.  Pulmonary/Chest: Effort normal.   Abdominal: No distension.  + suprapubic  tenderness- improved  Musculoskeletal: Normal range of motion. No edema.   Neurological: Alert and oriented to person, place, month  follows simple commands  Skin: Skin is warm and dry.   Psychiatric: Normal mood and affect. Behavior is normal.                  Vital Signs  Temp: 99 °F (37.2 °C) (02/13/23 1530)  Pulse: 89 (02/13/23 1530)  Resp: 18 (02/13/23 1530)  BP: (Abnormal) 121/58 (02/13/23 1530)  SpO2: (Abnormal) 92 % (02/13/23 1530)       24 Hour VS Range    Temp:  [98 °F (36.7 °C)-99 °F (37.2 °C)]   Pulse:  [75-91]   Resp:  [11-18]   BP: (104-121)/(56-60)   SpO2:  [92 %-97 %]     Intake/Output Summary (Last 24 hours) at 2/13/2023 2220  Last data filed at 2/13/2023 0643  Gross per 24 hour    Intake 480 ml   Output 576 ml   Net -96 ml         I/O This Shift:  No intake/output data recorded.    Wt Readings from Last 3 Encounters:   02/09/23 58.5 kg (129 lb)   01/27/23 58.9 kg (129 lb 13.6 oz)   11/02/21 58.9 kg (129 lb 13.6 oz)       I have personally reviewed the vitals and recorded Intake/Output     Laboratory/Diagnostic Data:    CBC/Anemia Labs: Coags:    Recent Labs   Lab 02/10/23  0436 02/11/23  0451 02/12/23 0241 02/13/23  0338   WBC 5.12 3.20* 2.38* 2.03*   HGB 11.2* 9.9* 9.1* 9.4*   HCT 33.6* 29.7* 27.7* 28.5*   PLT 93* 75* 60* 58*   MCV 99* 101* 104* 101*   RDW 16.2* 16.3* 16.3* 15.9*   FOLATE 15.1  --   --   --    [id number]  --   --   --     Recent Labs   Lab 02/07/23  1424 02/08/23  0338 02/11/23 0451 02/12/23 0241 02/13/23  0338   INR 1.6*   < > 1.7* 1.7* 1.5*   APTT 32.3*  --   --   --   --     < > = values in this interval not displayed.        Chemistries: ABG:   Recent Labs   Lab 02/11/23 0451 02/12/23 0241 02/13/23  0338    134* 136   K 3.9 3.6 4.0    105 110   CO2 21* 20* 21*   BUN 4* 4* 6   CREATININE 0.6 0.5 0.6   CALCIUM 8.4* 8.4* 8.7   PROT 4.7* 4.3* 4.5*   BILITOT 3.1* 2.7* 2.1*   ALKPHOS 104 95 129   ALT 19 19 20   AST 46* 47* 44*   MG 1.8 1.5* 1.8   PHOS 3.1 3.0 2.7    Recent Labs   Lab 02/10/23  0537   PH 7.456*   PCO2 23.6*   PO2 117*   HCO3 16.6*   POCSATURATED 99   BE -7        POCT Glucose: HbA1c:    Recent Labs   Lab 02/10/23  0504   POCTGLUCOSE 114*    Hemoglobin A1C   Date Value Ref Range Status   01/22/2021 4.1 4.0 - 5.6 % Final     Comment:     ADA Screening Guidelines:  5.7-6.4%  Consistent with prediabetes  >or=6.5%  Consistent with diabetes  High levels of fetal hemoglobin interfere with the HbA1C  assay. Heterozygous hemoglobin variants (HbS, HgC, etc)do  not significantly interfere with this assay.   However, presence of multiple variants may affect accuracy.     12/10/2020 4.6 4.0 - 5.6 % Final     Comment:     ADA Screening  Guidelines:  5.7-6.4%  Consistent with prediabetes  >or=6.5%  Consistent with diabetes  High levels of fetal hemoglobin interfere with the HbA1C  assay. Heterozygous hemoglobin variants (HbS, HgC, etc)do  not significantly interfere with this assay.   However, presence of multiple variants may affect accuracy.     05/16/2020 4.1 4.0 - 5.6 % Final     Comment:     ADA Screening Guidelines:  5.7-6.4%  Consistent with prediabetes  >or=6.5%  Consistent with diabetes  High levels of fetal hemoglobin interfere with the HbA1C  assay. Heterozygous hemoglobin variants (HbS, HgC, etc)do  not significantly interfere with this assay.   However, presence of multiple variants may affect accuracy.          Cardiac Enzymes: Ejection Fractions:    No results for input(s): CPK, CPKMB, MB, TROPONINI in the last 72 hours. No results found for: EF       No results for input(s): COLORU, APPEARANCEUA, PHUR, SPECGRAV, PROTEINUA, GLUCUA, KETONESU, BILIRUBINUA, OCCULTUA, NITRITE, UROBILINOGEN, LEUKOCYTESUR, RBCUA, WBCUA, BACTERIA, SQUAMEPITHEL, HYALINECASTS in the last 48 hours.    Invalid input(s): WRIGHTSUR      Lactate (Lactic Acid) (mmol/L)   Date Value   11/02/2021 1.8   01/11/2021 2.0   01/15/2017 2.3 (H)     No results found for: BNP  No results found for: CRP, SEDRATE  No results found for: DDIMER  Ferritin (ng/mL)   Date Value   10/23/2015 412 (H)   09/19/2015 599 (H)   09/19/2015 599 (H)   07/23/2015 551 (H)   06/17/2015 612 (H)     LD (U/L)   Date Value   09/19/2015 280 (H)     Troponin I (ng/mL)   Date Value   01/25/2023 <0.006     CPK (U/L)   Date Value   01/25/2023 27   06/14/2020 23 (L)     No results found for this or any previous visit.  SARS-CoV-2 RNA, Amplification, Qual (no units)   Date Value   11/02/2021 Negative   01/21/2021 Negative   01/11/2021 Negative   11/21/2020 Negative   06/16/2020 Negative       Microbiology labs for the last week  Microbiology Results (last 7 days)       Procedure Component Value Units  "Date/Time    Blood culture [[id number]] Collected: 02/09/23 0847    Order Status: Completed Specimen: Blood Updated: 02/13/23 1012     Blood Culture, Routine No Growth to date      No Growth to date      No Growth to date      No Growth to date      No Growth to date    Blood culture [[id number]] Collected: 02/09/23 0847    Order Status: Completed Specimen: Blood Updated: 02/13/23 1012     Blood Culture, Routine No Growth to date      No Growth to date      No Growth to date      No Growth to date      No Growth to date    Urine culture [[id number]]  (Abnormal)  (Susceptibility) Collected: 02/08/23 1230    Order Status: Completed Specimen: Urine Updated: 02/12/23 1058     Urine Culture, Routine ESCHERICHIA COLI  >100,000 cfu/ml        ENTEROCOCCUS FAECALIS  > 100,000 cfu/ml      Narrative:      Specimen Source->Urine            Reviewed and noted in plan where applicable- Please see chart for full lab data.    Lines/Drains:       Peripheral IV - Single Lumen 02/07/23 1425 20 G Anterior;Right Upper Arm (Active)   Site Assessment Clean;Dry;Intact 02/09/23 0800   Extremity Assessment Distal to IV No swelling;No redness;No abnormal discoloration 02/09/23 0800   Line Status Saline locked 02/09/23 0800   Dressing Status Clean;Dry;Intact 02/09/23 0800   Dressing Intervention Integrity maintained 02/09/23 0800   Dressing Change Due 02/11/23 02/09/23 0800   Site Change Due 02/11/23 02/09/23 0800   Reason Not Rotated Not due 02/09/23 0800   Number of days: 2            Urethral Catheter 02/08/23 1240 16 Fr. (Active)   $ Saldaña Insertion Bedside Insertion Performed 02/09/23 1305   Site Assessment Clean;Intact 02/09/23 0800   Collection Container Urimeter 02/09/23 0800   Securement Method secured to top of thigh w/ adhesive device 02/09/23 0800   Catheter Care Performed yes 02/09/23 0800   Reason for Continuing Urinary Catheterization Urinary retention 02/09/23 0800   CAUTI Prevention Bundle Securement Device in place with 1" slack " 02/09/23 0800   Output (mL) 200 mL 02/09/23 0800   Number of days: 1       Imaging  ECG Results              EKG 12-lead (Final result)  Result time 02/07/23 15:57:30      Final result by Interface, Lab In [facility] (02/07/23 15:57:30)               Narrative:    Test Reason : R41.82,    Vent. Rate : 056 BPM     Atrial Rate : 056 BPM     P-R Int : 152 ms          QRS Dur : 082 ms      QT Int : 438 ms       P-R-T Axes : 077 036 087 degrees     QTc Int : 422 ms    Artifact  Sinus bradycardia  Nonspecific T wave abnormality  Abnormal ECG  When compared with ECG of 25-JAN-2023 11:13,  Poor data quality in current ECG precludes serial comparison    Confirmed by Ayush Hay MD ([id number]) on 2/7/2023 3:57:21 PM    Referred By: [email or web address]   SELF           Confirmed By:Ayush Hay MD                                  Results for orders placed during the hospital encounter of 11/21/20    Echo Color Flow Doppler? Yes    Interpretation Summary  · The left ventricle is normal in size with normal systolic function. The estimated ejection fraction is 55%.  · There is left ventricular concentric remodeling.  · Normal left ventricular diastolic function.  · Normal right ventricular size with normal right ventricular systolic function.  · Normal central venous pressure (3 mmHg).      X-Ray Chest 1 View  Narrative: EXAMINATION:  XR CHEST 1 VIEW    CLINICAL HISTORY:  r/o pneumonia;    TECHNIQUE:  One view    COMPARISON:  Comparison is made to 01/25/2023.    FINDINGS:  Heart size is normal, as is the appearance of the pulmonary vascularity.  Lung zones are clear, and are free of significant airspace consolidation or volume loss.  No pleural fluid.  No hilar or mediastinal mass lesion.  No pneumothorax.  Impression: No significant intrathoracic abnormality.  No significant detrimental interval change in the appearance of the chest since 01/25/2023 is appreciated.    Electronically signed by: Americo Reyes  MD  Date:    02/09/2023  Time:    11:12  US Abdomen Limited  Narrative: EXAMINATION:  US ABDOMEN LIMITED    CLINICAL HISTORY:  r/o ascitis;    TECHNIQUE:  Limited ultrasound evaluation of the abdomen was performed to evaluate for ascites.    COMPARISON:  Abdominal ultrasound 01/25/2023.  CT abdomen pelvis 01/21/2021.    FINDINGS:  Limited sonographic evaluation of the all 4 abdominal quadrants demonstrates no significant ascites.    Redemonstrated dilated varices within the right lower quadrant.  Impression: No significant ascites.    Electronically signed by resident: Wilian Daily  Date:    02/09/2023  Time:    10:15    Electronically signed by: Eriberto Holliday  Date:    02/09/2023  Time:    10:49      Labs, Imaging, EKG and Diagnostic results from 2/13/2023 were reviewed.    Medications:  Medication list was reviewed and changes noted under Assessment/Plan.  No current facility-administered medications on file prior to encounter.     Current Outpatient Medications on File Prior to Encounter   Medication Sig Dispense Refill    ARIPiprazole (ABILIFY) 10 MG Tab Take 10 mg by mouth nightly.      furosemide (LASIX) 20 MG tablet Take 0.5 tablets (10 mg total) by mouth 2 (two) times daily. 30 tablet 1    hydrOXYzine (ATARAX) 50 MG tablet Take 50 mg by mouth every evening.      levETIRAcetam (KEPPRA) 500 MG Tab Take 1,000 mg by mouth 2 (two) times daily.      lithium (LITHOTAB) 300 mg tablet Take 300 mg by mouth nightly.      multivitamin Tab Take 1 tablet by mouth once daily.      pantoprazole (PROTONIX) 40 MG tablet Take 40 mg by mouth once daily.      propranoloL (INDERAL) 20 MG tablet Take 20 mg by mouth once daily.      QUEtiapine (SEROQUEL) 100 MG Tab Take 100 mg by mouth every evening.      spironolactone (ALDACTONE) 25 MG tablet Take 25 mg by mouth once daily.       Scheduled Medications:  amoxicillin, 875 mg, Oral, Q12H  ARIPiprazole, 10 mg, Oral, Nightly  folic acid, 1 mg, Oral, Daily  hydrOXYzine, 50 mg, Oral,  QHS  lactulose, 20 g, Oral, TID  levETIRAcetam, 1,000 mg, Oral, BID  lithium, 300 mg, Oral, QHS  multivitamin, 1 tablet, Oral, Daily  pantoprazole, 40 mg, Oral, Daily  thiamine, 100 mg, Oral, Daily      PRN: acetaminophen, melatonin, naloxone, ondansetron, polyethylene glycol, prochlorperazine, sodium chloride 0.9%  Infusions:       Estimated Creatinine Clearance: 86.6 mL/min (based on SCr of 0.6 mg/dL).     Assessment/Plan:      * Hepatic encephalopathy  See above  Patient with hepatic enephalopathy  Recent Labs   Lab 02/11/23 0451   AMMONIA 58*     continue lactulose TID with goal of 3-4 bowel movements daily.  2/9 Alert and oriented to person, place. follows simple commands  2/11 BCX 2 NGTD. Ammonia trended down to 58  resumed lactulose at  10g TID    Metabolic acidosis   2/10 9 episodes of BMs yesterday. lactulose held. severe metabolic acidosis likely from diarrhea.. patient with bicarbonate   Recent Labs   Lab 02/11/23 0451 02/12/23 0241 02/13/23 0338   CO2 21* 20* 21*    .started on bicarbonate drip. monitor   2/11 off bicarbonate drip. continue sodium bicarbonate    Diarrhea  2/10. 9 episodes of BMs yesterday. lactulose held.   2/11 resolved.  resumed lactulose at  10g TID    SVT (supraventricular tachycardia)  2/19  SVT 160s overnight ,asymptomatic improved with  1L NS bolus initiated and carotid massage . converted to NSR on EKG.      Elevated INR  patient with INR   Recent Labs   Lab 02/11/23 0451 02/12/23 0241 02/13/23  0338   INR 1.7* 1.7* 1.5*   . INR is supratherapeutic. secondary to coagulopathy from liver disease.monitor      Hypophosphatemia  replaced      Hypokalemia    replaced     UTI (urinary tract infection)  2/9  UA + UC pending. stared on IV ceftriaxone .   2/10 . UC - GRAM NEGATIVE BILLY >100,000 cfu/ml Identification and susceptibility pending .ENTEROCOCCUS SPECIES > 100,000 cfu/ml dentification and susceptibility pending. started on amoxicillin  2/12 E coli sensitive to ceftriaxone  and Enterococcus sensitive to amoxicillin      Bipolar 1 disorder, depressed, severe  - Continue home lithium  - Continue home Abilify  - Holding home Seroquel    Malnutrition  Nutrition consulted      Anemia    Patient's with macrocytic anemia.. Hemoglobin stable. Etiology likely due to chronic disease . B12 and folate levels   Current CBC reviewed-    Recent Labs   Lab 02/10/23  0436 02/11/23  0451 02/12/23  0241   HGB 11.2* 9.9* 9.1*     Monitor CBC and transfuse if H/H drops below 7/21.       History of seizure  Continue home Keppra      Thrombocytopenia  Secondary to cirrhosis   Trend CBC  2/9     Patient with thrombocytopenia   Recent Labs   Lab 02/11/23  0451 02/12/23  0241 02/13/23  0338   PLT 75* 60* 58*   . Platelet counts stable.monitor    Cirrhosis, Laennec's  MELD-Na score: 16 at 2/9/2023  2:23 AM  MELD score: 16 at 2/9/2023  2:23 AM  Calculated from:  Serum Creatinine: 0.8 mg/dL (Using min of 1 mg/dL) at 2/9/2023  2:22 AM  Serum Sodium: 138 mmol/L (Using max of 137 mmol/L) at 2/9/2023  2:22 AM  Total Bilirubin: 3.0 mg/dL at 2/9/2023  2:22 AM  INR(ratio): 1.6 at 2/9/2023  2:23 AM  Age: 57 years     - Lactulose 4 times daily  - Holding home Lasix given concern for dehydration  - Continue spironolactone   - Holding home propranolol given bradycardia  - Low-sodium diet, fluid restriction 1500 mL  - Nutrition consulted  - Mentation improving  - PT/OT      VTE Risk Mitigation (From admission, onward)           Ordered     IP VTE LOW RISK PATIENT  Once         02/07/23 1635     Place sequential compression device  Until discontinued         02/07/23 1635                    Discharge Planning   BRAYAN:      Code Status: Full Code   Is the patient medically ready for discharge?: No    Reason for patient still in hospital (select all that apply): Treatment  Discharge Plan A: Skilled Nursing Facility   Discharge Delays: None known at this time              Seth Noyola MD  Department of Hospital Medicine   [facility] ([place]

## 2023-02-14 NOTE — PLAN OF CARE
Problem: Adult Inpatient Plan of Care  Goal: Plan of Care Review  2/14/2023 1649 by Mayra Baires RN  Outcome: Met  2/14/2023 0957 by Mayra Baires RN  Outcome: Ongoing, Progressing  Goal: Patient-Specific Goal (Individualized)  2/14/2023 1649 by Mayra Baires RN  Outcome: Met  2/14/2023 0957 by Mayra Baires RN  Outcome: Ongoing, Progressing  Goal: Absence of Hospital-Acquired Illness or Injury  2/14/2023 1649 by Mayra Baires RN  Outcome: Met  2/14/2023 0957 by Mayra Baires RN  Outcome: Ongoing, Progressing  Goal: Optimal Comfort and Wellbeing  2/14/2023 1649 by Mayra Baires RN  Outcome: Met  2/14/2023 0957 by Mayra Baires RN  Outcome: Ongoing, Progressing  Goal: Readiness for Transition of Care  2/14/2023 1649 by Mayra Baires RN  Outcome: Met  2/14/2023 0957 by Mayra Baires RN  Outcome: Ongoing, Progressing     Problem: Skin Injury Risk Increased  Goal: Skin Health and Integrity  2/14/2023 1649 by Mayra Baires RN  Outcome: Met  2/14/2023 0957 by Mayra Baires RN  Outcome: Ongoing, Progressing     Problem: Infection  Goal: Absence of Infection Signs and Symptoms  2/14/2023 1649 by Mayra Baires RN  Outcome: Met  2/14/2023 0957 by Mayra Baires RN  Outcome: Ongoing, Progressing     Problem: Fluid Volume Excess  Goal: Fluid Balance  2/14/2023 1649 by Mayra Baires RN  Outcome: Met  2/14/2023 0957 by Mayra Bairse RN  Outcome: Ongoing, Progressing     Problem: Urinary Retention  Goal: Effective Urinary Elimination  2/14/2023 1649 by Mayra Baires RN  Outcome: Met  2/14/2023 0957 by Mayra Baires RN  Outcome: Ongoing, Progressing

## 2023-02-14 NOTE — NURSING TRANSFER
Nursing Transfer Note      2/14/2023     Reason patient is being transferred: DC to [facility]     Transfer To: [facility]     Transfer via wheelchair    Transfer with na    Transported by [facility]    Medicines sent: na    Any special needs or follow-up needed: na    Chart send with patient: No    Notified: Report called to Edwige at [facility]     Patient reassessed at: 2/14/23, 1650 (date, time)    Upon arrival to floor: call bell in reach

## 2023-02-14 NOTE — PHYSICIAN QUERY
PT Name: Marely Hamilton  MR #: [id number]    DOCUMENTATION CLARIFICATION     CDS/: Saima Gudino RN, CDI            Contact information:[email or web address]     This form is a permanent document in the medical record.     Query Date: February 14, 2023    By submitting this query, we are merely seeking further clarification of documentation.. Please utilize your independent clinical judgment when addressing the question(s) below.    The medical record contains the following:   Indicators  Supporting Clinical Findings Location in Medical Record   x Energy Intake  Pt reports good appetite PTA, usually eat 3 meals per day, takes MV and Boost x1/d at home. Reports tolerated 50% of breakfast this AM. Denies N/V, chewing/swallowing difficulties. Nutrition consult 2/8   x Weight Loss Pt unsure of UBW. Last recorded wt is 129lb from 11/2/2021, no significant wt changes Nutrition consult 2/8   x Fat Loss Orbital Region (Subcutaneous Fat Loss): moderate depletion  Upper Arm Region (Subcutaneous Fat Loss): moderate depletion  Nutrition consult 2/8   x Muscle Loss Anabaptism Region (Muscle Loss): severe depletion  Clavicle Bone Region (Muscle Loss): severe depletion  Clavicle and Acromion Bone Region (Muscle Loss): severe depletion  Dorsal Hand (Muscle Loss): severe depletion  Anterior Thigh Region (Muscle Loss): moderate depletion  Posterior Calf Region (Muscle Loss): moderate depletion Nutrition consult 2/8    Edema/Fluid Accumulation      Reduced  Strength (by dynamometer)     x Weight, BMI, Usual Body Weight Weight: 58.5 kg (129 lb)  BMI (Calculated): 22.9  Last recorded wt is 129lb from 11/2/2021 Nutrition consult 2/8    Delayed Wound Healing     x Registered Dietician Diagnosis NFPE completed today, noted moderate-severe fat and muscle depletion. However, does not meet criteria for malnutrition at this time.  Nutrition consult 2/8   x Acute or Chronic Illness Diagnosis:  (Hepatic encephalopathy)  Relevant Medical  History: cirrhosis, malnutrition    57 yoF with a h/o alcoholic cirrhosis, hepatic encephalopathy, seizure, bipolar disorder who present with altered mental status Nutrition consult 2/8    Social or Environmental Circumstances     x Treatment Recommendations     1. Continue Low Na diet    2. Add Boost Plus TID    3. RD to monitor and follow   Nutrition consult 2/8   x Other  Appearance: She is underweight  Malnutrition  Nutrition consulted H&P 2/7     Academy of Nutrition and Dietetics (Academy) and the American Society for Parenteral and Enteral Nutrition (A.S.P.E.N.) Clinical Characteristics to support Malnutrition   Malnutrition in the Context of Acute Illness or Injury Malnutrition in the Context of Chronic Illness or Injury Malnutrition in the Context of Social or Environmental Circumstances   Malnutrition Level Moderate Severe Moderate Severe   Moderate   Severe   Energy Intake <75%                   >7 days <50%                 >5 days <75%           >1 month <75%                      >1 month   <75% for >3 months   <50% for >1 month   Weight Loss   1-2% in 1 week >2% in 1 week 5% in 1 month >5% in 1 month 5% in 1 month >5% in 1 month    5% in 1 month >5% in 1 month 7.5% in 3 months >7.5% in 3 months 7.5% in 3 months >7.5% in 3 months    7.5% in 3 months >7.5% in 3 months 10% in 6 months >10% in 6 months 10% in 6 months >10% in 6 months        20% in 1 year                    >20% in 1 year                                                                  20% in 1 year                            >20% in 1 year                                                  Subcutaneous Fat Loss Mild  Moderate  Mild  Severe    Mild   Severe   Muscle Loss Mild  Moderate  Mild  Severe    Mild   Severe   Edema/Fluid Accumulation Mild Moderate to severe  Mild  Severe   Mild   Severe   Reduced  Strength         (based on standards supplied by  of dynamometer) N/A Measurably reduced N/A Measurably reduced N/A  Measurably reduced     Criteria for mild malnutrition is defined as 1 characteristic outlined above within the established moderate or severe parameters.  A minimum of 2 out of the 6 characteristics noted above are recommended for a diagnosis of moderate or severe malnutrition.  Chronic illness/injury is a disease/condition lasting 3 months or longer.    The noted clinical guidelines are only system guidelines and do not replace the providers clinical judgment.    Provider, please clarify the diagnosis or diagnoses associated with above clinical findings.    [  ] Mild Malnutrition - 1 characteristic outlined above within the established moderate or severe parameters   [  ] Moderate Malnutrition - a minimum of 2 of the 6 moderate malnutrition characteristics noted above    [  ] Malnutrition, Unspecified degree   [  ] Failure to Thrive   [  ] Underweight   [  ] Other Nutritional Diagnosis (please specify): _______   [x  ] Malnutrition ruled out     Please document in your progress notes daily for the duration of treatment until resolved and  include in your discharge summary.      References:    LARA Valencia, & JENNIFER Silva (2022, April). Assessment and management of anorexia and cachexia in palliative care. Retrieved May 23, 2022, from https://www.[email or web address]/contents/assessment-and-management-of-anorexia-and-cachexia-in-palliative-care?[email or web address]&source=see_link     CARRINGTON Iglesias, PhD, RD, AMERICA, CHRISTIANO Peters, PhD, RN, ARTHUR Orozco MD, PhD, [place] PEREIRA A., MS, RD, [facility], LEXII Escalante, MS, RD, The Academy Malnutrition Work Group, The A.S.P.E.N. Board of Directors. (2012). Consensus Statement: Academy of Nutrition and Dietetics and American Society for Parenteral and Enteral Nutrition: Characteristics Recommended for the Identification and Documentation of Adult Malnutrition (Undernutrition). Journal of Parenteral and Enteral Nutrition, 36(3), 275-283. doi:10.1177/0693575775667836     Form No. 91010

## 2023-02-14 NOTE — PLAN OF CARE
Pt has been accepted to transfer to OS[facility].  SW set up a WC van via the [facility] to  the pt at 5pm.  SW gave the nurse the number to call report.      EMMANUEL StroudW   PRN

## 2023-02-14 NOTE — PT/OT/SLP PROGRESS
"Occupational Therapy   Treatment    Name: Marely Hamilton  MRN: [id number]  Admitting Diagnosis:  Hepatic encephalopathy       Recommendations:     Discharge Recommendations: nursing facility, skilled  Discharge Equipment Recommendations:  none  Barriers to discharge:  Decreased caregiver support    Assessment:     Marely Hamilton is a 58 y.o. female with a medical diagnosis of Hepatic encephalopathy.  She presents with deficits in self-care tasks as well as mobility and endurance. Pt. Tolerated session well on this date. Pt. Pleasant and cooperative. Pt. Continues to require SBA/CGA for functional mobility in room. Patient would benefit from continued OT services to maximize level of safety and independence with self-care tasks.   Performance deficits affecting function are weakness, impaired endurance, impaired self care skills, impaired functional mobility, gait instability, impaired cardiopulmonary response to activity.     Rehab Prognosis:  Good; patient would benefit from acute skilled OT services to address these deficits and reach maximum level of function.       Plan:     Patient to be seen 3 x/week to address the above listed problems via self-care/home management, therapeutic activities, therapeutic exercises, neuromuscular re-education  Plan of Care Expires: 03/11/23  Plan of Care Reviewed with: patient    Subjective   " It's my [birth year]!"  Pain/Comfort:  Pain Rating 1: 0/10  Pain Rating Post-Intervention 1: 0/10    Objective:     Communicated with: nurse prior to session.  Patient found supine with PureWick, telemetry upon OT entry to room.Telesitter in room    General Precautions: Standard, fall    Orthopedic Precautions:N/A  Braces: N/A  Respiratory Status: Room air     Occupational Performance:     Bed Mobility:    Patient completed Supine to Sit with supervision     Functional Mobility/Transfers:  Patient completed Sit <> Stand Transfer with stand by assistance  with  rolling walker   Patient " completed Bed <> Chair Transfer using Stand Pivot technique with stand by assistance with rolling walker  Patient completed Toilet Transfer Stand Pivot technique with stand by assistance with  rolling walker  Functional Mobility: Pt. Ambulated ~ 20 feet in room x 2  trials with RW and CGA due to unsteadiness     Activities of Daily Living:  Upper Body Dressing: minimum assistance to don gown like robe  Toileting: minimum assistance to manage clothing in stand      [facility] 6 Click ADL: 20    Treatment & Education:  Pt. Educated on safety with mobility and ADL tasks.     Patient left up in chair with all lines intact and call button in reach   Telesitter present and nurse notified  GOALS:   Multidisciplinary Problems       Occupational Therapy Goals          Problem: Occupational Therapy    Goal Priority Disciplines Outcome Interventions   Occupational Therapy Goal     OT, PT/OT Ongoing, Progressing    Description: Goals to be met by: 2/23/2023    Patient will increase functional independence with ADLs by performing:    Feeding with [place].  UE Dressing with [place].  LE Dressing with [place].  Grooming while standing at sink with Modified [place] using AD PRN.  Toileting from toilet with [place] for hygiene and clothing management.   Toilet transfer to toilet with Modified [place] using AD PRN.  Step transfer with Modified [place] using AD PRN.                         Time Tracking:     OT Date of Treatment: 02/14/23  OT Start Time: 1207  OT Stop Time: 1225  OT Total Time (min): 18 min    Billable Minutes:Self Care/Home Management 18    OT/DC: OT     DC Visit Number: 0    2/14/2023

## 2023-02-14 NOTE — PLAN OF CARE
"   [facility]     Department of Hospital Medicine     [address] [place] [address]     [phone] after hours  [phone] fax       NURSING HOME ORDERS    Patient Name: Marely Hamilton  YOB: [birth year]    Admit to Nursing Home:   Skilled Bed                                            Diagnoses:  Active Hospital Problems    Diagnosis  POA    *Hepatic encephalopathy [K76.82]  Yes    SVT (supraventricular tachycardia) [I47.1]  Yes    Diarrhea [R19.7]  Yes    Metabolic acidosis [E87.20]  Yes    Hypophosphatemia [E83.39]  Yes    Elevated INR [R79.1]  Yes    Hypokalemia [E87.6]  Unknown    UTI (urinary tract infection) [N39.0]  Unknown    Bipolar 1 disorder, depressed, severe [F31.4]  Yes    Malnutrition [E46]  Yes    Anemia [D64.9]  Yes     6 units PRBC since June 2015      Cirrhosis, Laennec's [K70.30]  Yes    History of seizure [Z87.898]  Yes     One seizure 2013- "low blood sugar from a starvation diet"      Thrombocytopenia [D69.6]  Yes      Resolved Hospital Problems   No resolved problems to display.       Patient is homebound due to:  Hepatic encephalopathy    Allergies:  Review of patient's allergies indicates:   Allergen Reactions    Sulfa (sulfonamide antibiotics) Rash    Codeine Nausea And Vomiting       Vitals:      Every shift (Skilled Nursing patients)    Diet: cardiac diet                  Acitivities:    - Up in a chair each morning as tolerated  - Ambulate with assistance to bathroom  - Scheduled walks once each shift (every 8 hours)  - May ambulate independently  - May use walker, cane, or self-propelled wheelchair       - Weight bearing: as tolerated      LABS:  Per facility protocol     Nursing Precautions:     - Aspiration precautions:             - Total assistance with meals            -  Upright 90 degrees befor during and after meals             -  Suction at bedside          - Fall precautions per nursing home " protocol   - Seizure precaution per prison protocol   - Decubitus precautions:        -  for positioning   - Pressure reducing foam mattress   - Turn patient every two hours. Use wedge pillows to anchor patient    CONSULTS:     Physical Therapy to evaluate and treat     Occupational Therapy to evaluate and treat     MISCELLANEOUS CARE:       DIABETES CARE:  NA    Medications: Discontinue all previous medication orders, if any. See new list below.        Medication List        Start taking these medications      amoxicillin 875 MG tablet  Commonly known as: AMOXIL  Take 1 tablet (875 mg total) by mouth every 12 (twelve) hours. for 1 day     folic acid 1 MG tablet  Commonly known as: FOLVITE  Take 1 tablet (1 mg total) by mouth once daily.     sodium bicarbonate 650 MG tablet  Take 2 tablets (1,300 mg total) by mouth 2 (two) times daily.     thiamine 100 MG tablet  Take 1 tablet (100 mg total) by mouth once daily.            Change how you take these medications      lactulose 20 gram/30 mL Soln  Commonly known as: CHRONULAC  Take 45 mLs (30 g total) by mouth 3 (three) times daily.  What changed: medication strength            Continue taking these medications      ARIPiprazole 10 MG Tab  Commonly known as: ABILIFY  Take 10 mg by mouth nightly.     furosemide 20 MG tablet  Commonly known as: LASIX  Take 0.5 tablets (10 mg total) by mouth 2 (two) times daily. (may hold until discharge from [facility])      hydrOXYzine 50 MG tablet  Commonly known as: ATARAX  Take 50 mg by mouth every evening.     levETIRAcetam 500 MG Tab  Commonly known as: KEPPRA  Take 1,000 mg by mouth 2 (two) times daily.     lithium 300 mg tablet  Commonly known as: LITHOTAB  Take 300 mg by mouth nightly.     multivitamin Tab  Take 1 tablet by mouth once daily.     pantoprazole 40 MG tablet  Commonly known as: PROTONIX  Take 40 mg by mouth once daily.     propranoloL 20 MG tablet  Commonly known as: INDERAL  Take 20 mg by mouth once daily.      QUEtiapine 100 MG Tab  Commonly known as: SEROQUEL  Take 100 mg by mouth every evening.     spironolactone 25 MG tablet  Commonly known as: ALDACTONE  Take 25 mg by mouth once daily.            Stop taking these medications      mometasone 0.1 % ointment  Commonly known as: ELOCON                   _________________________________  Seth Noyola MD  02/14/2023

## 2023-02-14 NOTE — PROGRESS NOTES
[facility] - [facility] ([address] Medicine  Progress Note    Patient Name: Marely Hamilton  MRN: [id number]  Patient Class: IP- Inpatient   Admission Date: 2/7/2023  Length of Stay: 7 days  Attending Physician: Seth Noyola MD  Primary Care Provider: Viktor Ross MD        Subjective:     Principal Problem:Hepatic encephalopathy        HPI:  Ms. Hamilton is a 57 yoF with a h/o alcoholic cirrhosis, hepatic encephalopathy, seizure, bipolar disorder who present with altered mental status.  History taken from chart review given patient only able to state that she is confused.  Per report, EMS was called by patient's sister given concern for worsening mental status.  Sister also states patient has become less able take care of herself and manage her medications.  Believes patient has not taking her lactulose in over a week.    In the ED, VSS.  Labs notable for creatinine 0.4, hemoglobin 12.5, bilirubin 3.1, platelets 59, INR 1.6, ammonia 131.  CT head unremarkable.          Overview/Hospital Course:  2/9 K and P replaced. UA + UC pending. stared on IV ceftriaxone . CX ray -No significant intrathoracic abnormality.and BCx 2 . sono abdomen with no ascitis  2/10  SVT 160s overnight ,asymptomatic improved with  1L NS bolus initiated and carotid massage . converted to NSR on EKG. K, MG and P replaced. 9 episodes of BMs yesterday. lactulose held. severe metabolic acidosis likely from diarrhea. started on sodium bicarbonate drip . UC - GRAM NEGATIVE BILLY >100,000 cfu/ml Identification and susceptibility pending .ENTEROCOCCUS SPECIES > 100,000 cfu/ml dentification and susceptibility pending. started on amoxicillin  2/11 BCX 2 NGTD. E coli sensitive to ceftriaxone and Enterococcus sensitive to amoxicillin. Ammonia trended down to 58. Bicarb at 21. Saldaña cath discontinued. resumed lactulose at  10g TID  2/12  Mg and K replaced . 2BMs yesterday.   2/14 lactulose increased to 30g TID. sister zanes [facility]. discharge to [facility] today          Review of Systems:   Pain scale:   Unable to perform ROS: Mental status change   Constitutional:  fever,  chills, headache, vision loss, hearing loss, weight loss, Generalized weakness, falls, loss of smell, loss of taste, poor appetite,  sore throat, epistaxis- resolved  Respiratory: cough, shortness of breath.   Cardiovascular: chest pain, dizziness, palpitations, orthopnea, swelling of feet, syncope  Gastrointestinal: nausea, vomiting, abdominal pain- suprapubic -improved , diarrhea -improved , black stool,  blood in stool, change in bowel habits  Genitourinary: hematuria, dysuria, urgency, frequency  Integument/Breast: rash,  pruritis  Hematologic/Lymphatic: easy bruising, lymphadenopathy  Musculoskeletal: arthralgias , myalgias, back pain, neck pain, knee pain  Neurological: confusion, seizures, tremors, slurred speech  Behavioral/Psych:  depression, anxiety, auditory or visual hallucinations     OBJECTIVE:     Physical Exam:  Body mass index is 22.85 kg/m².    Constitutional: Appears well-developed and well-nourished.   Head: Normocephalic and atraumatic.   Neck: Normal range of motion. Neck supple.   Cardiovascular: Normal heart rate.  Regular heart rhythm.  Pulmonary/Chest: Effort normal.   Abdominal: No distension.  + suprapubic  tenderness- improved  Musculoskeletal: Normal range of motion. No edema.   Neurological: Alert and oriented to person, place, month  follows simple commands  Skin: Skin is warm and dry.   Psychiatric: Normal mood and affect. Behavior is normal.                  Vital Signs  Temp: 99.4 °F (37.4 °C) (02/14/23 1137)  Pulse: 87 (02/14/23 1137)  Resp: 19 (02/14/23 1137)  BP: (Abnormal) 115/57 (02/14/23 1137)  SpO2: (Abnormal) 91 % (02/14/23 1137)       24 Hour VS Range    Temp:  [97.3 °F (36.3 °C)-99.4 °F (37.4 °C)]   Pulse:  [72-91]   Resp:  [16-19]   BP: (108-131)/(54-60)   SpO2:  [91 %-96 %]     Intake/Output Summary (Last 24 hours) at  2/14/2023 1308  Last data filed at 2/14/2023 0530  Gross per 24 hour   Intake 470 ml   Output 1200 ml   Net -730 ml         I/O This Shift:  No intake/output data recorded.    Wt Readings from Last 3 Encounters:   02/09/23 58.5 kg (129 lb)   01/27/23 58.9 kg (129 lb 13.6 oz)   11/02/21 58.9 kg (129 lb 13.6 oz)       I have personally reviewed the vitals and recorded Intake/Output     Laboratory/Diagnostic Data:    CBC/Anemia Labs: Coags:    Recent Labs   Lab 02/10/23  0436 02/11/23  0451 02/12/23  0241 02/13/23 0338 02/14/23 0457   WBC 5.12   < > 2.38* 2.03* 1.96*   HGB 11.2*   < > 9.1* 9.4* 9.6*   HCT 33.6*   < > 27.7* 28.5* 30.4*   PLT 93*   < > 60* 58* 50*   MCV 99*   < > 104* 101* 107*   RDW 16.2*   < > 16.3* 15.9* 16.4*   FOLATE 15.1  --   --   --   --    [id number]  --   --   --   --     < > = values in this interval not displayed.    Recent Labs   Lab 02/07/23  1424 02/08/23 0338 02/12/23 0241 02/13/23 0338 02/14/23 0457   INR 1.6*   < > 1.7* 1.5* 1.6*   APTT 32.3*  --   --   --   --     < > = values in this interval not displayed.        Chemistries: ABG:   Recent Labs   Lab 02/12/23  0241 02/13/23 0338 02/14/23 0457   * 136 135*   K 3.6 4.0 3.7    110 108   CO2 20* 21* 20*   BUN 4* 6 5*   CREATININE 0.5 0.6 0.5   CALCIUM 8.4* 8.7 8.7   PROT 4.3* 4.5* 4.7*   BILITOT 2.7* 2.1* 2.5*   ALKPHOS 95 129 110   ALT 19 20 20   AST 47* 44* 45*   MG 1.5* 1.8 1.6   PHOS 3.0 2.7 2.7    Recent Labs   Lab 02/10/23  0537   PH 7.456*   PCO2 23.6*   PO2 117*   HCO3 16.6*   POCSATURATED 99   BE -7        POCT Glucose: HbA1c:    Recent Labs   Lab 02/10/23  0504   POCTGLUCOSE 114*    Hemoglobin A1C   Date Value Ref Range Status   01/22/2021 4.1 4.0 - 5.6 % Final     Comment:     ADA Screening Guidelines:  5.7-6.4%  Consistent with prediabetes  >or=6.5%  Consistent with diabetes  High levels of fetal hemoglobin interfere with the HbA1C  assay. Heterozygous hemoglobin variants (HbS, HgC, etc)do  not  significantly interfere with this assay.   However, presence of multiple variants may affect accuracy.     12/10/2020 4.6 4.0 - 5.6 % Final     Comment:     ADA Screening Guidelines:  5.7-6.4%  Consistent with prediabetes  >or=6.5%  Consistent with diabetes  High levels of fetal hemoglobin interfere with the HbA1C  assay. Heterozygous hemoglobin variants (HbS, HgC, etc)do  not significantly interfere with this assay.   However, presence of multiple variants may affect accuracy.     05/16/2020 4.1 4.0 - 5.6 % Final     Comment:     ADA Screening Guidelines:  5.7-6.4%  Consistent with prediabetes  >or=6.5%  Consistent with diabetes  High levels of fetal hemoglobin interfere with the HbA1C  assay. Heterozygous hemoglobin variants (HbS, HgC, etc)do  not significantly interfere with this assay.   However, presence of multiple variants may affect accuracy.          Cardiac Enzymes: Ejection Fractions:    No results for input(s): CPK, CPKMB, MB, TROPONINI in the last 72 hours. No results found for: EF       No results for input(s): COLORU, APPEARANCEUA, PHUR, SPECGRAV, PROTEINUA, GLUCUA, KETONESU, BILIRUBINUA, OCCULTUA, NITRITE, UROBILINOGEN, LEUKOCYTESUR, RBCUA, WBCUA, BACTERIA, SQUAMEPITHEL, HYALINECASTS in the last 48 hours.    Invalid input(s): WRIGHTSUR      Lactate (Lactic Acid) (mmol/L)   Date Value   11/02/2021 1.8   01/11/2021 2.0   01/15/2017 2.3 (H)     No results found for: BNP  No results found for: CRP, SEDRATE  No results found for: DDIMER  Ferritin (ng/mL)   Date Value   10/23/2015 412 (H)   09/19/2015 599 (H)   09/19/2015 599 (H)   07/23/2015 551 (H)   06/17/2015 612 (H)     LD (U/L)   Date Value   09/19/2015 280 (H)     Troponin I (ng/mL)   Date Value   01/25/2023 <0.006     CPK (U/L)   Date Value   01/25/2023 27   06/14/2020 23 (L)     No results found for this or any previous visit.  SARS-CoV-2 RNA, Amplification, Qual (no units)   Date Value   11/02/2021 Negative   01/21/2021 Negative   01/11/2021  "Negative   11/21/2020 Negative   06/16/2020 Negative       Microbiology labs for the last week  Microbiology Results (last 7 days)       Procedure Component Value Units Date/Time    Blood culture [[id number]] Collected: 02/09/23 0847    Order Status: Completed Specimen: Blood Updated: 02/14/23 1012     Blood Culture, Routine No growth after 5 days.    Blood culture [[id number]] Collected: 02/09/23 0847    Order Status: Completed Specimen: Blood Updated: 02/14/23 1012     Blood Culture, Routine No growth after 5 days.    Urine culture [[id number]]  (Abnormal)  (Susceptibility) Collected: 02/08/23 1230    Order Status: Completed Specimen: Urine Updated: 02/12/23 1058     Urine Culture, Routine ESCHERICHIA COLI  >100,000 cfu/ml        ENTEROCOCCUS FAECALIS  > 100,000 cfu/ml      Narrative:      Specimen Source->Urine            Reviewed and noted in plan where applicable- Please see chart for full lab data.    Lines/Drains:       Peripheral IV - Single Lumen 02/07/23 1425 20 G Anterior;Right Upper Arm (Active)   Site Assessment Clean;Dry;Intact 02/09/23 0800   Extremity Assessment Distal to IV No swelling;No redness;No abnormal discoloration 02/09/23 0800   Line Status Saline locked 02/09/23 0800   Dressing Status Clean;Dry;Intact 02/09/23 0800   Dressing Intervention Integrity maintained 02/09/23 0800   Dressing Change Due 02/11/23 02/09/23 0800   Site Change Due 02/11/23 02/09/23 0800   Reason Not Rotated Not due 02/09/23 0800   Number of days: 2            Urethral Catheter 02/08/23 1240 16 Fr. (Active)   $ Saldaña Insertion Bedside Insertion Performed 02/09/23 1305   Site Assessment Clean;Intact 02/09/23 0800   Collection Container Urimeter 02/09/23 0800   Securement Method secured to top of thigh w/ adhesive device 02/09/23 0800   Catheter Care Performed yes 02/09/23 0800   Reason for Continuing Urinary Catheterization Urinary retention 02/09/23 0800   CAUTI Prevention Bundle Securement Device in place with 1" slack " 02/09/23 0800   Output (mL) 200 mL 02/09/23 0800   Number of days: 1       Imaging  ECG Results              EKG 12-lead (Final result)  Result time 02/07/23 15:57:30      Final result by Interface, Lab In [facility] (02/07/23 15:57:30)               Narrative:    Test Reason : R41.82,    Vent. Rate : 056 BPM     Atrial Rate : 056 BPM     P-R Int : 152 ms          QRS Dur : 082 ms      QT Int : 438 ms       P-R-T Axes : 077 036 087 degrees     QTc Int : 422 ms    Artifact  Sinus bradycardia  Nonspecific T wave abnormality  Abnormal ECG  When compared with ECG of 25-JAN-2023 11:13,  Poor data quality in current ECG precludes serial comparison    Confirmed by Ayush Hay MD ([id number]) on 2/7/2023 3:57:21 PM    Referred By: [email or web address]   SELF           Confirmed By:Ayush Hay MD                                  Results for orders placed during the hospital encounter of 11/21/20    Echo Color Flow Doppler? Yes    Interpretation Summary  · The left ventricle is normal in size with normal systolic function. The estimated ejection fraction is 55%.  · There is left ventricular concentric remodeling.  · Normal left ventricular diastolic function.  · Normal right ventricular size with normal right ventricular systolic function.  · Normal central venous pressure (3 mmHg).      X-Ray Chest 1 View  Narrative: EXAMINATION:  XR CHEST 1 VIEW    CLINICAL HISTORY:  r/o pneumonia;    TECHNIQUE:  One view    COMPARISON:  Comparison is made to 01/25/2023.    FINDINGS:  Heart size is normal, as is the appearance of the pulmonary vascularity.  Lung zones are clear, and are free of significant airspace consolidation or volume loss.  No pleural fluid.  No hilar or mediastinal mass lesion.  No pneumothorax.  Impression: No significant intrathoracic abnormality.  No significant detrimental interval change in the appearance of the chest since 01/25/2023 is appreciated.    Electronically signed by: Americo Reyes  MD  Date:    02/09/2023  Time:    11:12  US Abdomen Limited  Narrative: EXAMINATION:  US ABDOMEN LIMITED    CLINICAL HISTORY:  r/o ascitis;    TECHNIQUE:  Limited ultrasound evaluation of the abdomen was performed to evaluate for ascites.    COMPARISON:  Abdominal ultrasound 01/25/2023.  CT abdomen pelvis 01/21/2021.    FINDINGS:  Limited sonographic evaluation of the all 4 abdominal quadrants demonstrates no significant ascites.    Redemonstrated dilated varices within the right lower quadrant.  Impression: No significant ascites.    Electronically signed by resident: Wilian Daily  Date:    02/09/2023  Time:    10:15    Electronically signed by: Eribreto Holliday  Date:    02/09/2023  Time:    10:49      Labs, Imaging, EKG and Diagnostic results from 2/14/2023 were reviewed.    Medications:  Medication list was reviewed and changes noted under Assessment/Plan.  No current facility-administered medications on file prior to encounter.     Current Outpatient Medications on File Prior to Encounter   Medication Sig Dispense Refill    ARIPiprazole (ABILIFY) 10 MG Tab Take 10 mg by mouth nightly.      furosemide (LASIX) 20 MG tablet Take 0.5 tablets (10 mg total) by mouth 2 (two) times daily. 30 tablet 1    hydrOXYzine (ATARAX) 50 MG tablet Take 50 mg by mouth every evening.      levETIRAcetam (KEPPRA) 500 MG Tab Take 1,000 mg by mouth 2 (two) times daily.      lithium (LITHOTAB) 300 mg tablet Take 300 mg by mouth nightly.      multivitamin Tab Take 1 tablet by mouth once daily.      pantoprazole (PROTONIX) 40 MG tablet Take 40 mg by mouth once daily.      propranoloL (INDERAL) 20 MG tablet Take 20 mg by mouth once daily.      QUEtiapine (SEROQUEL) 100 MG Tab Take 100 mg by mouth every evening.      spironolactone (ALDACTONE) 25 MG tablet Take 25 mg by mouth once daily.       Scheduled Medications:  amoxicillin, 875 mg, Oral, Q12H  ARIPiprazole, 10 mg, Oral, Nightly  folic acid, 1 mg, Oral, Daily  hydrOXYzine, 50 mg, Oral,  QHS  lactulose, 30 g, Oral, TID  levETIRAcetam, 1,000 mg, Oral, BID  lithium, 300 mg, Oral, QHS  multivitamin, 1 tablet, Oral, Daily  pantoprazole, 40 mg, Oral, Daily  QUEtiapine, 100 mg, Oral, QHS  sodium bicarbonate, 1,300 mg, Oral, BID  thiamine, 100 mg, Oral, Daily      PRN: acetaminophen, melatonin, naloxone, ondansetron, polyethylene glycol, prochlorperazine, sodium chloride 0.9%  Infusions:       Estimated Creatinine Clearance: 102.7 mL/min (based on SCr of 0.5 mg/dL).     Assessment/Plan:      * Hepatic encephalopathy  See above  Patient with hepatic enephalopathy  Recent Labs   Lab 02/11/23  0451   AMMONIA 58*     continue lactulose TID with goal of 3-4 bowel movements daily.  2/9 Alert and oriented to person, place. follows simple commands  2/11 BCX 2 NGTD. Ammonia trended down to 58  resumed lactulose at  10g TID  2/14 lactulose increased to 30g TID. sister preferes [facility]    Metabolic acidosis   2/10 9 episodes of BMs yesterday. lactulose held. severe metabolic acidosis likely from diarrhea.. patient with bicarbonate   Recent Labs   Lab 02/12/23  0241 02/13/23 0338 02/14/23  0457   CO2 20* 21* 20*    .started on bicarbonate drip. monitor   2/11 off bicarbonate drip. continue sodium bicarbonate    Diarrhea  2/10. 9 episodes of BMs yesterday. lactulose held.   2/11 resolved.  resumed lactulose at  10g TID    SVT (supraventricular tachycardia)  2/19  SVT 160s overnight ,asymptomatic improved with  1L NS bolus initiated and carotid massage . converted to NSR on EKG.      Elevated INR  patient with INR   Recent Labs   Lab 02/12/23  0241 02/13/23  0338 02/14/23  0457   INR 1.7* 1.5* 1.6*   . INR is supratherapeutic. secondary to coagulopathy from liver disease.monitor      Hypophosphatemia  replaced      Hypokalemia    replaced     UTI (urinary tract infection)  2/9  UA + UC pending. stared on IV ceftriaxone .   2/10 . UC - GRAM NEGATIVE BILLY >100,000 cfu/ml Identification and susceptibility pending  .ENTEROCOCCUS SPECIES > 100,000 cfu/ml dentification and susceptibility pending. started on amoxicillin  2/12 E coli sensitive to ceftriaxone and Enterococcus sensitive to amoxicillin      Bipolar 1 disorder, depressed, severe  - Continue home lithium  - Continue home Abilify  - Holding home Seroquel    Malnutrition  Nutrition consulted      Anemia    Patient's with macrocytic anemia.. Hemoglobin stable. Etiology likely due to chronic disease . B12 and folate levels   Current CBC reviewed-    Recent Labs   Lab 02/12/23  0241 02/13/23  0338 02/14/23  0457   HGB 9.1* 9.4* 9.6*     Monitor CBC and transfuse if H/H drops below 7/21.       History of seizure  Continue home Keppra      Thrombocytopenia  Secondary to cirrhosis   Trend CBC  2/9     Patient with thrombocytopenia   Recent Labs   Lab 02/12/23 0241 02/13/23 0338 02/14/23  0457   PLT 60* 58* 50*   . Platelet counts stable.monitor    Cirrhosis, Laennec's  MELD-Na score: 16 at 2/9/2023  2:23 AM  MELD score: 16 at 2/9/2023  2:23 AM  Calculated from:  Serum Creatinine: 0.8 mg/dL (Using min of 1 mg/dL) at 2/9/2023  2:22 AM  Serum Sodium: 138 mmol/L (Using max of 137 mmol/L) at 2/9/2023  2:22 AM  Total Bilirubin: 3.0 mg/dL at 2/9/2023  2:22 AM  INR(ratio): 1.6 at 2/9/2023  2:23 AM  Age: 57 years     - Lactulose 4 times daily  - Holding home Lasix given concern for dehydration  - Continue spironolactone   - Holding home propranolol given bradycardia  - Low-sodium diet, fluid restriction 1500 mL  - Nutrition consulted  - Mentation improving  - PT/OT      VTE Risk Mitigation (From admission, onward)           Ordered     IP VTE LOW RISK PATIENT  Once         02/07/23 1635     Place sequential compression device  Until discontinued         02/07/23 1635                    Discharge Planning   BRAYAN:      Code Status: Full Code   Is the patient medically ready for discharge?: No    Reason for patient still in hospital (select all that apply): Treatment  Discharge Plan A:  Skilled Nursing Facility   Discharge Delays: None known at this time              Seth Noyola MD  Department of Hospital Medicine   [facility] - Intensive Care ([place]-[facility])

## 2023-02-14 NOTE — DISCHARGE SUMMARY
[facility] - Intensive Care ([place] [address])  [facility] Medicine  Discharge Summary      Patient Name: Marely Hamilton  MRN: [id number]  BUTCH: [id number]  Patient Class: IP- Inpatient  Admission Date: 2/7/2023  Hospital Length of Stay: 7 days  Discharge Date and Time:  02/14/2023 1:08 PM  Attending Physician: Seth Noyola MD   Discharging Provider: Seth Noyola MD  Primary Care Provider: Viktor Ross MD  [facility] Medicine Team: [facility]OSP MED N Seth Noyola MD  Primary Care Team: [facility] MED N    HPI:   Ms. Hamilton is a 57 yoF with a h/o alcoholic cirrhosis, hepatic encephalopathy, seizure, bipolar disorder who present with altered mental status.  History taken from chart review given patient only able to state that she is confused.  Per report, EMS was called by patient's sister given concern for worsening mental status.  Sister also states patient has become less able take care of herself and manage her medications.  Believes patient has not taking her lactulose in over a week.    In the ED, VSS.  Labs notable for creatinine 0.4, hemoglobin 12.5, bilirubin 3.1, platelets 59, INR 1.6, ammonia 131.  CT head unremarkable.            * No surgery found *      Hospital Course:   2/9 K and P replaced. UA + UC pending. stared on IV ceftriaxone . CX ray -No significant intrathoracic abnormality.and BCx 2 . sono abdomen with no ascitis  2/10  SVT 160s overnight ,asymptomatic improved with  1L NS bolus initiated and carotid massage . converted to NSR on EKG. K, MG and P replaced. 9 episodes of BMs yesterday. lactulose held. severe metabolic acidosis likely from diarrhea. started on sodium bicarbonate drip . UC - GRAM NEGATIVE BILLY >100,000 cfu/ml Identification and susceptibility pending .ENTEROCOCCUS SPECIES > 100,000 cfu/ml dentification and susceptibility pending. started on amoxicillin  2/11 BCX 2 NGTD. E coli sensitive to ceftriaxone and Enterococcus sensitive to amoxicillin. Ammonia trended down to 58.  Bicarb at 21. Saldaña cath discontinued. resumed lactulose at  10g TID  2/12  Mg and K replaced . 2BMs yesterday.   2/14 lactulose increased to 30g TID. sister preferes [facility]. discharge to [facility] today         Goals of Care Treatment Preferences:  Code Status: Full Code      Consults:   Consults (From admission, onward)        Status Ordering Provider     Inpatient consult to Registered Dietitian/Nutritionist  Once        Provider:  (Not yet assigned)    Completed DOREEN BURDCIK        Assessment/Plan:      * Hepatic encephalopathy  See above  Patient with hepatic enephalopathy      Recent Labs   Lab 02/11/23  0451   AMMONIA 58*      continue lactulose TID with goal of 3-4 bowel movements daily.  2/9 Alert and oriented to person, place. follows simple commands  2/11 BCX 2 NGTD. Ammonia trended down to 58  resumed lactulose at  10g TID  2/14 lactulose increased to 30g TID. sister preferes [facility]     Metabolic acidosis   2/10 9 episodes of BMs yesterday. lactulose held. severe metabolic acidosis likely from diarrhea.. patient with bicarbonate         Recent Labs   Lab 02/12/23  0241 02/13/23 0338 02/14/23  0457   CO2 20* 21* 20*    .started on bicarbonate drip. monitor   2/11 off bicarbonate drip. continue sodium bicarbonate     Diarrhea  2/10. 9 episodes of BMs yesterday. lactulose held.   2/11 resolved.  resumed lactulose at  10g TID     SVT (supraventricular tachycardia)  2/19  SVT 160s overnight ,asymptomatic improved with  1L NS bolus initiated and carotid massage . converted to NSR on EKG.       Elevated INR  patient with INR         Recent Labs   Lab 02/12/23  0241 02/13/23 0338 02/14/23  0457   INR 1.7* 1.5* 1.6*   . INR is supratherapeutic. secondary to coagulopathy from liver disease.monitor        Hypophosphatemia  replaced        Hypokalemia     replaced      UTI (urinary tract infection)  2/9  UA + UC pending. stared on IV ceftriaxone .   2/10 . UC - GRAM NEGATIVE BILLY >100,000 cfu/ml  Identification and susceptibility pending .ENTEROCOCCUS SPECIES > 100,000 cfu/ml dentification and susceptibility pending. started on amoxicillin  2/12 E coli sensitive to ceftriaxone and Enterococcus sensitive to amoxicillin        Bipolar 1 disorder, depressed, severe  - Continue home lithium  - Continue home Abilify  - Holding home Seroquel     Malnutrition  Nutrition consulted        Anemia     Patient's with macrocytic anemia.. Hemoglobin stable. Etiology likely due to chronic disease . B12 and folate levels   Current CBC reviewed-          Recent Labs   Lab 02/12/23 0241 02/13/23 0338 02/14/23  0457   HGB 9.1* 9.4* 9.6*      Monitor CBC and transfuse if H/H drops below 7/21.         History of seizure  Continue home Keppra        Thrombocytopenia  Secondary to cirrhosis   Trend CBC  2/9      Patient with thrombocytopenia         Recent Labs   Lab 02/12/23 0241 02/13/23 0338 02/14/23  0457   PLT 60* 58* 50*   . Platelet counts stable.monitor     Cirrhosis, Laennec's  MELD-Na score: 16 at 2/9/2023  2:23 AM  MELD score: 16 at 2/9/2023  2:23 AM  Calculated from:  Serum Creatinine: 0.8 mg/dL (Using min of 1 mg/dL) at 2/9/2023  2:22 AM  Serum Sodium: 138 mmol/L (Using max of 137 mmol/L) at 2/9/2023  2:22 AM  Total Bilirubin: 3.0 mg/dL at 2/9/2023  2:22 AM  INR(ratio): 1.6 at 2/9/2023  2:23 AM  Age: 57 years      - Lactulose 4 times daily  - Holding home Lasix given concern for dehydration  - Continue spironolactone   - Holding home propranolol given bradycardia  - Low-sodium diet, fluid restriction 1500 mL  - Nutrition consulted  - Mentation improving  - PT/OT            Final Active Diagnoses:    Diagnosis Date Noted POA    PRINCIPAL PROBLEM:  Hepatic encephalopathy [K76.82] 10/11/2015 Yes    SVT (supraventricular tachycardia) [I47.1] 02/10/2023 Yes    Diarrhea [R19.7] 02/10/2023 Yes    Metabolic acidosis [E87.20] 02/10/2023 Yes    Hypophosphatemia [E83.39] 02/09/2023 Yes    Elevated INR [R79.1]  02/09/2023 Yes    Hypokalemia [E87.6] 11/03/2021 Unknown    UTI (urinary tract infection) [N39.0] 11/03/2021 Unknown    Bipolar 1 disorder, depressed, severe [F31.4]  Yes    Malnutrition [E46] 10/11/2015 Yes    Anemia [D64.9] 09/18/2015 Yes    Cirrhosis, Laennec's [K70.30] 06/15/2015 Yes    History of seizure [Z87.898] 06/15/2015 Yes    Thrombocytopenia [D69.6] 06/15/2015 Yes      Problems Resolved During this Admission:       Medications:  Reconciled Home Medications:      Medication List      Start taking these medications    amoxicillin 875 MG tablet  Commonly known as: AMOXIL  Take 1 tablet (875 mg total) by mouth every 12 (twelve) hours. for 1 day     folic acid 1 MG tablet  Commonly known as: FOLVITE  Take 1 tablet (1 mg total) by mouth once daily.     sodium bicarbonate 650 MG tablet  Take 2 tablets (1,300 mg total) by mouth 2 (two) times daily.     thiamine 100 MG tablet  Take 1 tablet (100 mg total) by mouth once daily.        Change how you take these medications    lactulose 20 gram/30 mL Soln  Commonly known as: CHRONULAC  Take 45 mLs (30 g total) by mouth 3 (three) times daily.  What changed: medication strength        Continue taking these medications    ARIPiprazole 10 MG Tab  Commonly known as: ABILIFY  Take 10 mg by mouth nightly.     furosemide 20 MG tablet  Commonly known as: LASIX  Take 0.5 tablets (10 mg total) by mouth 2 (two) times daily.     hydrOXYzine 50 MG tablet  Commonly known as: ATARAX  Take 50 mg by mouth every evening.     levETIRAcetam 500 MG Tab  Commonly known as: KEPPRA  Take 1,000 mg by mouth 2 (two) times daily.     lithium 300 mg tablet  Commonly known as: LITHOTAB  Take 300 mg by mouth nightly.     multivitamin Tab  Take 1 tablet by mouth once daily.     pantoprazole 40 MG tablet  Commonly known as: PROTONIX  Take 40 mg by mouth once daily.     propranoloL 20 MG tablet  Commonly known as: INDERAL  Take 20 mg by mouth once daily.     QUEtiapine 100 MG Tab  Commonly  known as: SEROQUEL  Take 100 mg by mouth every evening.     spironolactone 25 MG tablet  Commonly known as: ALDACTONE  Take 25 mg by mouth once daily.        Stop taking these medications    mometasone 0.1 % ointment  Commonly known as: ELOCON              Discharged Condition: fair    Disposition: Skilled Nursing Facility          Follow Up:     Patient Instructions:   No discharge procedures on file.    Laboratory/Diagnostic Data:    CBC/Anemia Labs: Coags:    Recent Labs   Lab 02/10/23  0436 02/11/23 0451 02/12/23 0241 02/13/23 0338 02/14/23 0457   WBC 5.12   < > 2.38* 2.03* 1.96*   HGB 11.2*   < > 9.1* 9.4* 9.6*   HCT 33.6*   < > 27.7* 28.5* 30.4*   PLT 93*   < > 60* 58* 50*   MCV 99*   < > 104* 101* 107*   RDW 16.2*   < > 16.3* 15.9* 16.4*   FOLATE 15.1  --   --   --   --    [id number] 944  --   --   --   --     < > = values in this interval not displayed.    Recent Labs   Lab 02/07/23  1424 02/08/23 0338 02/12/23 0241 02/13/23 0338 02/14/23 0457   INR 1.6*   < > 1.7* 1.5* 1.6*   APTT 32.3*  --   --   --   --     < > = values in this interval not displayed.        Chemistries: ABG:   Recent Labs   Lab 02/12/23 0241 02/13/23 0338 02/14/23 0457   * 136 135*   K 3.6 4.0 3.7    110 108   CO2 20* 21* 20*   BUN 4* 6 5*   CREATININE 0.5 0.6 0.5   CALCIUM 8.4* 8.7 8.7   PROT 4.3* 4.5* 4.7*   BILITOT 2.7* 2.1* 2.5*   ALKPHOS 95 129 110   ALT 19 20 20   AST 47* 44* 45*   MG 1.5* 1.8 1.6   PHOS 3.0 2.7 2.7    Recent Labs   Lab 02/10/23  0537   PH 7.456*   PCO2 23.6*   PO2 117*   HCO3 16.6*   POCSATURATED 99   BE -7        POCT Glucose: HbA1c:    Recent Labs   Lab 02/10/23  0504   POCTGLUCOSE 114*    Hemoglobin A1C   Date Value Ref Range Status   01/22/2021 4.1 4.0 - 5.6 % Final     Comment:     ADA Screening Guidelines:  5.7-6.4%  Consistent with prediabetes  >or=6.5%  Consistent with diabetes  High levels of fetal hemoglobin interfere with the HbA1C  assay. Heterozygous hemoglobin variants (HbS,  HgC, etc)do  not significantly interfere with this assay.   However, presence of multiple variants may affect accuracy.     12/10/2020 4.6 4.0 - 5.6 % Final     Comment:     ADA Screening Guidelines:  5.7-6.4%  Consistent with prediabetes  >or=6.5%  Consistent with diabetes  High levels of fetal hemoglobin interfere with the HbA1C  assay. Heterozygous hemoglobin variants (HbS, HgC, etc)do  not significantly interfere with this assay.   However, presence of multiple variants may affect accuracy.     05/16/2020 4.1 4.0 - 5.6 % Final     Comment:     ADA Screening Guidelines:  5.7-6.4%  Consistent with prediabetes  >or=6.5%  Consistent with diabetes  High levels of fetal hemoglobin interfere with the HbA1C  assay. Heterozygous hemoglobin variants (HbS, HgC, etc)do  not significantly interfere with this assay.   However, presence of multiple variants may affect accuracy.          Cardiac Enzymes: Ejection Fractions:    No results for input(s): CPK, CPKMB, MB, TROPONINI in the last 72 hours. No results found for: EF       No results for input(s): COLORU, APPEARANCEUA, PHUR, SPECGRAV, PROTEINUA, GLUCUA, KETONESU, BILIRUBINUA, OCCULTUA, NITRITE, UROBILINOGEN, LEUKOCYTESUR, RBCUA, WBCUA, BACTERIA, SQUAMEPITHEL, HYALINECASTS in the last 48 hours.    Invalid input(s): WRIGHTSUR    Lactate (Lactic Acid) (mmol/L)   Date Value   11/02/2021 1.8   01/11/2021 2.0   01/15/2017 2.3 (H)     No results found for: BNP  No results found for: CRP, SEDRATE  No results found for: DDIMER  Ferritin (ng/mL)   Date Value   10/23/2015 412 (H)   09/19/2015 599 (H)   09/19/2015 599 (H)   07/23/2015 551 (H)   06/17/2015 612 (H)     LD (U/L)   Date Value   09/19/2015 280 (H)     Troponin I (ng/mL)   Date Value   01/25/2023 <0.006     CPK (U/L)   Date Value   01/25/2023 27   06/14/2020 23 (L)     No results found for this or any previous visit.  SARS-CoV-2 RNA, Amplification, Qual (no units)   Date Value   11/02/2021 Negative   01/21/2021 Negative    01/11/2021 Negative   11/21/2020 Negative   06/16/2020 Negative       Microbiology labs for the last week  Microbiology Results (last 7 days)     Procedure Component Value Units Date/Time    Blood culture [[id number]] Collected: 02/09/23 0847    Order Status: Completed Specimen: Blood Updated: 02/14/23 1012     Blood Culture, Routine No growth after 5 days.    Blood culture [[id number]] Collected: 02/09/23 0847    Order Status: Completed Specimen: Blood Updated: 02/14/23 1012     Blood Culture, Routine No growth after 5 days.    Urine culture [[id number]]  (Abnormal)  (Susceptibility) Collected: 02/08/23 1230    Order Status: Completed Specimen: Urine Updated: 02/12/23 1058     Urine Culture, Routine ESCHERICHIA COLI  >100,000 cfu/ml        ENTEROCOCCUS FAECALIS  > 100,000 cfu/ml      Narrative:      Specimen Source->Urine            Reviewed and noted in plan where applicable- Please see chart for full lab data.    Lines/Drains:       Peripheral IV - Single Lumen 02/10/23 0525 20 G Anterior;Right Forearm (Active)   Site Assessment Clean;Dry;Intact 02/14/23 0900   Extremity Assessment Distal to IV No abnormal discoloration 02/14/23 0900   Line Status Saline locked 02/14/23 0900   Dressing Status Clean;Dry;Intact 02/13/23 2000   Dressing Intervention Integrity maintained 02/13/23 1600   Dressing Change Due 02/17/23 02/10/23 0800   Site Change Due 02/14/23 02/13/23 2000   Reason Not Rotated Not due 02/13/23 2000   Number of days: 4       Female External Urinary Catheter 02/13/23 2000 (Active)   Output (mL) 200 mL 02/14/23 0530   Number of days: 0       Imaging  ECG Results          EKG 12-lead (Final result)  Result time 02/07/23 15:57:30    Final result by Interface, Lab In [facility] (02/07/23 15:57:30)             Narrative:    Test Reason : R41.82,    Vent. Rate : 056 BPM     Atrial Rate : 056 BPM     P-R Int : 152 ms          QRS Dur : 082 ms      QT Int : 438 ms       P-R-T Axes : 077 036 087 degrees     QTc Int :  422 ms    Artifact  Sinus bradycardia  Nonspecific T wave abnormality  Abnormal ECG  When compared with ECG of 25-JAN-2023 11:13,  Poor data quality in current ECG precludes serial comparison    Confirmed by Ayush Hay MD ([id number]) on 2/7/2023 3:57:21 PM    Referred By: GLORIA   SELF           Confirmed By:Ayush Hay MD                            Results for orders placed during the hospital encounter of 11/21/20    Echo Color Flow Doppler? Yes    Interpretation Summary  · The left ventricle is normal in size with normal systolic function. The estimated ejection fraction is 55%.  · There is left ventricular concentric remodeling.  · Normal left ventricular diastolic function.  · Normal right ventricular size with normal right ventricular systolic function.  · Normal central venous pressure (3 mmHg).      X-Ray Chest 1 View  Narrative: EXAMINATION:  XR CHEST 1 VIEW    CLINICAL HISTORY:  r/o pneumonia;    TECHNIQUE:  One view    COMPARISON:  Comparison is made to 01/25/2023.    FINDINGS:  Heart size is normal, as is the appearance of the pulmonary vascularity.  Lung zones are clear, and are free of significant airspace consolidation or volume loss.  No pleural fluid.  No hilar or mediastinal mass lesion.  No pneumothorax.  Impression: No significant intrathoracic abnormality.  No significant detrimental interval change in the appearance of the chest since 01/25/2023 is appreciated.    Electronically signed by: Americo Reyes MD  Date:    02/09/2023  Time:    11:12  US Abdomen Limited  Narrative: EXAMINATION:  US ABDOMEN LIMITED    CLINICAL HISTORY:  r/o ascitis;    TECHNIQUE:  Limited ultrasound evaluation of the abdomen was performed to evaluate for ascites.    COMPARISON:  Abdominal ultrasound 01/25/2023.  CT abdomen pelvis 01/21/2021.    FINDINGS:  Limited sonographic evaluation of the all 4 abdominal quadrants demonstrates no significant ascites.    Redemonstrated dilated varices within the right lower  quadrant.  Impression: No significant ascites.    Electronically signed by resident: Wilian Daily  Date:    02/09/2023  Time:    10:15    Electronically signed by: Eriberto Holliday  Date:    02/09/2023  Time:    10:49      Imaging:  Imaging Results          CT Head Without Contrast (Final result)  Result time 02/07/23 15:58:19    Final result by Ayush Umana MD (02/07/23 15:58:19)             Impression:      Stable exam.  No evidence of acute hemorrhage or major vascular distribution infarct.    Electronically signed by resident: Emiliano Mcmahon  Date:    02/07/2023  Time:    15:38    Electronically signed by: Ayush Umana MD  Date:    02/07/2023  Time:    15:58           Narrative:    EXAMINATION:  CT HEAD WITHOUT CONTRAST    CLINICAL HISTORY:  Mental status change, unknown cause;    TECHNIQUE:  Low dose axial CT images obtained throughout the head without the use of intravenous contrast.  Axial, sagittal and coronal reconstructions were performed.    COMPARISON:  CT head 01/25/2023    FINDINGS:  The brain parenchyma appears unchanged.  No new hemorrhage or edema.  No new mass effect or midline shift.  No new major vascular distribution infarct.    Ventricles are unchanged in size. No hydrocephalus.    No new extra-axial blood or fluid collections are identified.    No displaced calvarial fracture.    Visualized mastoid air cells and paranasal sinuses are essentially clear.                                Follow Up Instructions:     No future appointments.    Discharge Instructions:  No discharge procedures on file.      Total time spent on discharge  45  minutes     Seth Noyola MD  Attending Staff Physician  [facility] Medicine

## 2023-02-14 NOTE — PLAN OF CARE
02/14/23 1338   Post-Acute Status   Post-Acute Authorization Placement   Post-Acute Placement Status Set-up Complete/Auth obtained   Discharge Delays None known at this time   Discharge Plan   Discharge Plan A Skilled Nursing Facility     Pt has been accepted to transfer to [facility] today and has auth. [facility] updated the pt.  Pt has a covid test pending.  SW will f/u as needed to set up transportation.    Maya Martinez LCSW   PRN

## 2023-02-15 PROCEDURE — 97535 SELF CARE MNGMENT TRAINING: CPT

## 2023-02-15 PROCEDURE — 97165 OT EVAL LOW COMPLEX 30 MIN: CPT

## 2023-02-15 PROCEDURE — 97110 THERAPEUTIC EXERCISES: CPT

## 2023-02-15 PROCEDURE — 97162 PT EVAL MOD COMPLEX 30 MIN: CPT

## 2023-02-15 PROCEDURE — 25000003 PHARM REV CODE 250: Performed by: HOSPITALIST

## 2023-02-15 PROCEDURE — 11000004 HC SNF PRIVATE

## 2023-02-15 PROCEDURE — 97530 THERAPEUTIC ACTIVITIES: CPT

## 2023-02-15 RX ADMIN — SPIRONOLACTONE 25 MG: 25 TABLET, FILM COATED ORAL at 10:02

## 2023-02-15 RX ADMIN — LITHIUM CARBONATE 300 MG: 300 TABLET, FILM COATED, EXTENDED RELEASE ORAL at 08:02

## 2023-02-15 RX ADMIN — LACTULOSE 30 G: 20 SOLUTION ORAL at 04:02

## 2023-02-15 RX ADMIN — AMOXICILLIN 875 MG: 875 TABLET, COATED ORAL at 01:02

## 2023-02-15 RX ADMIN — HYDROXYZINE HYDROCHLORIDE 50 MG: 25 TABLET, FILM COATED ORAL at 08:02

## 2023-02-15 RX ADMIN — Medication 6 MG: at 12:02

## 2023-02-15 RX ADMIN — THIAMINE HCL TAB 100 MG 100 MG: 100 TAB at 10:02

## 2023-02-15 RX ADMIN — FOLIC ACID 1 MG: 1 TABLET ORAL at 10:02

## 2023-02-15 RX ADMIN — QUETIAPINE FUMARATE 100 MG: 25 TABLET ORAL at 08:02

## 2023-02-15 RX ADMIN — LEVETIRACETAM 1000 MG: 500 TABLET, FILM COATED ORAL at 10:02

## 2023-02-15 RX ADMIN — THERA TABS 1 TABLET: TAB at 10:02

## 2023-02-15 RX ADMIN — PANTOPRAZOLE SODIUM 40 MG: 40 TABLET, DELAYED RELEASE ORAL at 10:02

## 2023-02-15 RX ADMIN — SODIUM BICARBONATE 1300 MG: 650 TABLET ORAL at 10:02

## 2023-02-15 RX ADMIN — LEVETIRACETAM 1000 MG: 500 TABLET, FILM COATED ORAL at 08:02

## 2023-02-15 RX ADMIN — ARIPIPRAZOLE 10 MG: 5 TABLET ORAL at 08:02

## 2023-02-15 RX ADMIN — SODIUM BICARBONATE 1300 MG: 650 TABLET ORAL at 08:02

## 2023-02-15 NOTE — NURSING
Pt arrived to floor for skilled services via wheel chair. Pt required 1 person assistance from wheelchair to bed. Pt is AAOX4, incontinent of B/B, Brief in place at arrival. Pt is on a Cardiac diet. Skin assessment done: Bruised to bilateral upper arms and hands, Bruised to right buttocks cheek, Blanchable redness to sacrum area, no additional skin breakdown noted, Patient presents with a distended abdomen at arrival.  Patients mother Anastasia informed of arrival. POC reviewed with patient. Pt denies pain at this time and is educated to use call light and not get OOB w/o assistance

## 2023-02-15 NOTE — PLAN OF CARE
Problem: Occupational Therapy  Goal: Occupational Therapy Goal  Description: Goals to be met by: 3/17/2023     Patient will increase functional independence with ADLs by performing:    UE Dressing with Cole.  LE Dressing with Cole.  Grooming while standing at sink with Modified Cole.  Toileting from toilet with Modified Cole for hygiene and clothing management.   Bathing from  shower chair/bench with Stand-by Assistance.  Supine to sit with Cole.  Step transfer with Modified Cole with RW.  Toilet transfer to toilet with Modified Cole with RW.    Outcome: Ongoing, Progressing     Pt evaluated and OT goals established.

## 2023-02-15 NOTE — PT/OT/SLP EVAL
Physical Therapy Evaluation    Patient Name:  Marely Hamilton   MRN:  [id number]  Admit Date: 2/14/2023  Diagnosis: Hepatic encephalopathy  General Precautions: Standard, fall   Orthopedic Precautions: N/A   Braces: N/A    Recommendations:     Discharge Recommendations: home with home health   Level of Assistance Recommended: Intermittent assistance   Discharge Equipment Recommendations: none   Barriers to discharge: Decreased caregiver support    Assessment:     Marely Hamilton is a 58 y.o. female admitted with a medical diagnosis of Hepatic encephalopathy.  Performance deficits affecting function  weakness, impaired endurance, impaired self care skills, impaired functional mobility, gait instability, impaired balance, decreased upper extremity function, decreased lower extremity function, impaired cardiopulmonary response to activity. Pt was Mod I for all functional mobility PTA and now requires [place]/CGA for most functional tasks. Pt would like to return home but has limited assistance from family, and would therefore benefit from continued SNF rehab.    Rehab Potential is good     Activity Tolerance: Good    Plan:     Patient to be seen 6 x/week to address the above listed problems via gait training, therapeutic activities, therapeutic exercises, neuromuscular re-education, wheelchair management/training     Plan of Care Expires: 03/17/23  Plan of Care Reviewed with: patient    Subjective     Chief Complaint: weakness  Patient/Family Comments/goals: gain (I)  Pain/Comfort:  Pain Rating 1: 0/10  Pain Rating Post-Intervention 1: 0/10    Living Environment:   Per pt, Pt lives with elderly mom in a H, 1 DAKOTA without HR, and walk in shower with no seat  Prior to admission, patients level of function was Mod I / Independent with ADL's and mobility. Patient and her mom do have hired help that come in from 9am - 1pm 6 days a week that assist her mom and perform heavier housework. Equipment used at home: walker, rolling   DME owned (not currently used): bedside commode, and wheelchair.  Upon discharge, patient will have assistance from unknown;  does have hired help 4 hours a day 6 days a week for mother and housework; pt requires assist with bathing    Patients cultural, spiritual, Judaism conflicts given the current situation: no    Objective:     Communicated with pt's nurse prior to session.  Patient found up in chair with    upon PT entry to room.    Exams:  Cognitive Exam:  Patient is oriented to Person, Place, Time, and Situation  Gross Motor Coordination:  WFL  Sensation:    -       Intact  Skin Integrity/Edema:      -       Skin integrity: Visible skin intact  RLE ROM: WFL  RLE Strength: grossly 4/5  LLE ROM: WFL  LLE Strength: grossly 4/5    Functional Mobility:     02/15/23 1135   Prior Functioning: Everyday Activities   Self Care Independent   Indoor Mobility (Ambulation) Independent   Stairs Unknown   Functional Cognition Independent   Prior Device Use Walker   Roll Left and Right   Assistance Needed Supervision   Comment SBA per OT eval   CARE Score - Roll Left and Right 4   Sit to Lying   Assistance Needed Supervision   Comment SBA per OT eval   CARE Score - Sit to Lying 4   Lying to Sitting on Side of Bed   Assistance Needed Supervision   Comment SBA   CARE Score - Lying to Sitting on Side of Bed 4   Sit to Stand   Physical Assistance Level 25% or less   Comment with RW from w/c   CARE Score - Sit to Stand 3   Chair/Bed-to-Chair Transfer   Physical Assistance Level 25% or less   Comment SPT with no AD   CARE Score - Chair/Bed-to-Chair Transfer 3   Car Transfer   Reason if not Attempted Environmental limitations   CARE Score - Car Transfer 10   Walk 10 Feet   Physical Assistance Level 25% or less   Comment with RW, 60ft +63ft. pt needing seated rest breaks between GT trials d.t fatigue. pt with very short step length and decrease alex.   CARE Score - Walk 10 Feet 3   Walk 50 Feet with Two Turns   Physical  Assistance Level 25% or less   Comment with RW, 60ft +63ft. pt needing seated rest breaks between GT trials d.t fatigue. pt with very short step length and decrease alex.   CARE Score - Walk 50 Feet with Two Turns 3   Walk 150 Feet   Reason if not Attempted Safety concerns   CARE Score - Walk 150 Feet 88   Walking 10 Feet on Uneven Surfaces   Physical Assistance Level 25% or less   Comment with RW   CARE Score - Walking 10 Feet on Uneven Surfaces 3   1 Step (Curb)   Reason if not Attempted Safety concerns   CARE Score - 1 Step (Curb) 88   4 Steps   Reason if not Attempted Safety concerns   CARE Score - 4 Steps 88   12 Steps   Reason if not Attempted Safety concerns   CARE Score - 12 Steps 88   Picking Up Object   Reason if not Attempted Safety concerns   CARE Score - Picking Up Object 88   Uses a Wheelchair/Scooter?   Uses a Wheelchair/Scooter? Yes   Wheel 50 Feet with Two Turns   Physical Assistance Level 26%-50%   CARE Score - Wheel 50 Feet with Two Turns 3   Type of Wheelchair/Scooter Manual   Wheel 150 Feet   Reason if not Attempted Safety concerns   CARE Score - Wheel 150 Feet 88   Type of Wheelchair/Scooter Manual       Therapeutic Activities and Exercises: Mini-eliptical set at medium resistance for 10mins to help improve B l/E MMT and endurance.  Additional time spent GT  Education:  Patient provided with daily orientation and goals of this PT session.  Patient educated to transfer to bedside chair/bedside commode/bathroom with RN/PCT present.   Patient educated on Fall risk and plan of care by explanation.   Patient Verbalized Understanding.  Time provided for therapeutic counseling and discussion of current health disposition. All questions answered to satisfaction, within scope of PT practice; voiced no other concerns. White board updated in patient's room, RN notified of session.     AM-PAC 6 CLICK MOBILITY  Total Score:17     Patient left up in chair with call button in reach.    GOALS:    Multidisciplinary Problems       Physical Therapy Goals          Problem: Physical Therapy    Goal Priority Disciplines Outcome Goal Variances Interventions   Physical Therapy Goal     PT, PT/OT Ongoing, Progressing     Description: Goals to be met by: 3/17/23     Patient will increase functional independence with mobility by performing:    . Supine to sit with [place]  . Sit to supine with [place]  . Rolling to Left and Right with [place].  . Sit to stand transfer with Supervision  . Bed to chair transfer with Supervision using No Assistive Device  . Gait  x 150 feet with Supervision using Rolling Walker.   . Wheelchair propulsion x100 feet with Contact Guard Assistance using bilateral uppper extremities  . Ascend/descend 4 stair with bilateral Handrails Contact Guard Assistance using No Assistive Device.   . Ascend/Descend 4 inch curb step with Contact Guard Assistance using Rolling Walker.                         History:     Past Medical History:   Diagnosis Date    Addiction to drug     Alcohol abuse     Alcohol abuse, in remission 6/15/2015    14.5 weeks ago; AA weekly    Anemia     Anxiety 6/15/2015    Behavioral problem     Bipolar disorder     Bipolar disorder in remission 6/15/2015    Cirrhosis, Laennec's 6/15/2015    Depression     Encounter for blood transfusion     Epistaxis 6/15/2015    Fatigue     Glaucoma     Hematuria     Hepatic encephalopathy 6/15/2015    Hepatic enlargement     History of psychiatric care     History of psychiatric hospitalization     History of seizure 6/15/2015    1    hx of intentional Tylenol overdose 6/15/2015    2005- situational and hx of bipolar    Hx of psychiatric care     Macrocytic anemia 9/18/2015    6 units PRBC since June 2015    Jeana     Osteoarthritis     Other ascites 6/15/2015    Psychiatric exam requested by authority     Psychiatric problem     Psychosis 9/26/2019    Renal disorder     Seizures     Self-harming behavior     Suicide attempt      Therapy     Thrombocytopenia 6/15/2015       Past Surgical History:   Procedure Laterality Date    COSMETIC SURGERY      ESOPHAGOGASTRODUODENOSCOPY         Time Tracking:     PT Received On: 02/15/23  PT Start Time: 1113     PT Stop Time: 1146  PT Total Time (min): 33 min     Billable Minutes: Evaluation 20 and Therapeutic Exercise 13      02/15/2023

## 2023-02-15 NOTE — PLAN OF CARE
Problem: Physical Therapy  Goal: Physical Therapy Goal  Description: Goals to be met by: 3/17/23     Patient will increase functional independence with mobility by performing:    . Supine to sit with [place]  . Sit to supine with [place]  . Rolling to Left and Right with [place].  . Sit to stand transfer with Supervision  . Bed to chair transfer with Supervision using No Assistive Device  . Gait  x 150 feet with Supervision using Rolling Walker.   . Wheelchair propulsion x100 feet with Contact Guard Assistance using bilateral uppper extremities  . Ascend/descend 4 stair with bilateral Handrails Contact Guard Assistance using No Assistive Device.   . Ascend/Descend 4 inch curb step with Contact Guard Assistance using Rolling Walker.    Outcome: Ongoing, Progressing

## 2023-02-15 NOTE — PLAN OF CARE
[facility] - Skilled Nursing  Discharge Final Note    Primary Care Provider: Viktor Ross MD    Expected Discharge Date:     Final Discharge Note (most recent)       Final Note - 02/14/23 1600          Final Note    Assessment Type Final Discharge Note (P)      Anticipated Discharge Disposition Skilled Nursing Facility (P)         Post-Acute Status    Discharge Delays None known at this time (P)                      Important Message from Medicare             Maya Martinez LCSW   PRN

## 2023-02-15 NOTE — PT/OT/SLP EVAL
"Occupational Therapy   Evaluation and Treatment    Name: Marely Hamilton  MRN: [id number]  Admit Date: 2/14/2023  Admission Diagnosis: Hepatic encephalopathy  Recent Surgeries: N/A     General Precautions: Standard, fall  Orthopedic Precautions:N/A   Braces: N/A    Recommendations:     Discharge Recommendations: home with home health  Level of Assistance Recommended: Intermittent assistance   Discharge Equipment Recommendations:  other (see comments) (TBD)  Barriers to discharge:  Decreased caregiver support    Assessment:     Marely Hamilton is a 58 y.o. female with a medical diagnosis of hepatic encephalopathy.   Pt tolerated session well and without incident, but she requires assistance to perform self-care tasks and mobility.  She would continue to benefit from skilled OT services at SNF to maximize her gains in functional independence.   She presents with the following.  Performance deficits affecting function: weakness, impaired endurance, impaired self care skills, impaired functional mobility, gait instability, impaired balance, pain, impaired fine motor, decreased upper extremity function, decreased lower extremity function.      Rehab Potential is good    Activity Tolerance: Good    Plan:     Patient to be seen 6 x/week to address the above listed problems via self-care/home management, therapeutic activities, therapeutic exercises  Plan of Care Expires: 03/17/23  Plan of Care Reviewed with: patient    Subjective   "I'm sleepy."  Chief Complaint: abdominal pain  Patient/Family Comments/goals: "I want to brush my teeth."    Occupational Profile:  Living Environment: Pt lives with her mother in a [facility] with 1 DAKOTA. She has a walk-in shower with a built-in bench.  Previous level of function: Independent with most ADLs, but she requires assistance for bathing.  Independent - Mod I to ambulate, using RW as needed.  She does not drive.   Roles and Routines: daughter  Equipment Used at Home: bedside commode, walker, " rolling, wheelchair, other (see comments) (shower bench)  Assistance upon Discharge: Her mother, who also requires assistance.  Hired caregivers, who assist pt and her mother for 4 hours/day for 5 days/week.      Pain/Comfort:  Pain Rating 1: 4/10  Location - Orientation 1: generalized  Location 1: abdomen  Pain Addressed 1: Reposition, Distraction  Pain Rating Post-Intervention 1: 4/10    Patients cultural, spiritual, Confucianist conflicts given the current situation: no    Objective:     Communicated with: nursing prior to session.  Patient found HOB elevated with Other (comments) (no active lines attached) upon OT entry to room.    Occupational Performance:     Bed Mobility:    Patient completed Rolling/Turning to Right with stand by assistance  Patient completed Scooting/Bridging with stand by assistance  Patient completed Supine to Sit with stand by assistance    Functional Mobility/Transfers:  Patient completed Sit <> Stand Transfer from EOB x 2 trials and from toilet x 1 trial with contact guard assistance with rolling walker and with grab bar from toilet  Patient completed Bed <> Chair Transfer using Step Transfer technique with contact guard assistance with rolling walker  Patient completed Toilet Transfer Step Transfer technique with contact guard assistance with  rolling walker and grab bar  Functional Mobility: Pt ambulated ~45 ft in total in her room with CGA with RW.  She reported no dizziness.  Pt was mildly unsteady and required cueing to remain inside the walker while turning.    Activities of Daily Living:  Feeding:  Writing therapist observed pt eating her lunch while seated in w/c with setup assistance of tray table.  Grooming: minimum assistance to brush her teeth while standing at the sink with (A) to loosen cap on toothpaste tube.  CGA for standing balance while pt washed her face at the sink.   Bathing: Pt respectfully declined to perform.   Upper Body Dressing: minimum assistance to doff  hospital gown/corey pullover shirt while seated EOB with (A) to initiate threading BUE through sleeves  Lower Body Dressing: minimum assistance to corey pants while seated EOB with (A) to thread her LLE.  Supervision to doff/corey non-skid socks while seated in bedside chair.  Toileting: contact guard assistance for transfers.  SBA for hygiene while seated on toilet.  Pt was able to urinate.  Mod A to manage adult brief while standing.    Cognitive/Visual Perceptual:  Cognitive/Psychosocial Skills:     -       Oriented to: Person, Place, Situation, and to month and day of the week.  Pt not oriented to the year.    -       Follows Commands/attention:Follows all one-step commands  -       Communication: clear/fluent    Physical Exam:  Dominant hand:    -       R-handed  Upper Extremity Range of Motion:     -       Right Upper Extremity: WFL  -       Left Upper Extremity: WFL  Upper Extremity Strength:    -       Right Upper Extremity: 4/5  -       Left Upper Extremity: 4/5   Strength:    -       Right Upper Extremity: moderate composite grasp  -       Left Upper Extremity: moderate composite grasp  Fine Motor Coordination: Impaired functionally during grooming task.     AMPA 6 Click ADL:  AMPAC Total Score: 19    Treatment & Education:  Pt edu on role of OT, POC, safety when performing self care tasks, benefit of performing OOB activity, and safety when performing functional transfers and mobility.  - White board updated  - Self care tasks completed-- as noted above      Patient left up in chair with call button in reach    GOALS:   Multidisciplinary Problems       Occupational Therapy Goals          Problem: Occupational Therapy    Goal Priority Disciplines Outcome Interventions   Occupational Therapy Goal     OT, PT/OT Ongoing, Progressing    Description: Goals to be met by: 3/17/2023     Patient will increase functional independence with ADLs by performing:    UE Dressing with [place].  LE Dressing with  [place].  Grooming while standing at sink with Modified [place].  Toileting from toilet with Modified [place] for hygiene and clothing management.   Bathing from  shower chair/bench with Stand-by Assistance.  Supine to sit with [place].  Step transfer with Modified [place] with RW.  Toilet transfer to toilet with Modified [place] with RW.                         History:     Past Medical History:   Diagnosis Date    Addiction to drug     Alcohol abuse     Alcohol abuse, in remission 6/15/2015    14.5 weeks ago; AA weekly    Anemia     Anxiety 6/15/2015    Behavioral problem     Bipolar disorder     Bipolar disorder in remission 6/15/2015    Cirrhosis, Laennec's 6/15/2015    Depression     Encounter for blood transfusion     Epistaxis 6/15/2015    Fatigue     Glaucoma     Hematuria     Hepatic encephalopathy 6/15/2015    Hepatic enlargement     History of psychiatric care     History of psychiatric hospitalization     History of seizure 6/15/2015    1    hx of intentional Tylenol overdose 6/15/2015    2005- situational and hx of bipolar    Hx of psychiatric care     Macrocytic anemia 9/18/2015    6 units PRBC since June 2015    Jeana     Osteoarthritis     Other ascites 6/15/2015    Psychiatric exam requested by authority     Psychiatric problem     Psychosis 9/26/2019    Renal disorder     Seizures     Self-harming behavior     Suicide attempt     Therapy     Thrombocytopenia 6/15/2015         Past Surgical History:   Procedure Laterality Date    COSMETIC SURGERY      ESOPHAGOGASTRODUODENOSCOPY         Time Tracking:     OT Date of Treatment: 02/15/23  OT Start Time: 0935  OT Stop Time: 1012  OT Total Time (min): 37 min    Billable Minutes:Evaluation 10 min  Self Care/Home Management 17 min  Therapeutic Activity 10 min    2/15/2023

## 2023-02-15 NOTE — PLAN OF CARE
Problem: Adult Inpatient Plan of Care  Goal: Plan of Care Review  Outcome: Ongoing, Progressing  Flowsheets (Taken 2/15/2023 0558)  Plan of Care Reviewed With: patient  Goal: Absence of Hospital-Acquired Illness or Injury  Outcome: Ongoing, Progressing  Intervention: Identify and Manage Fall Risk  Flowsheets (Taken 2/15/2023 0558)  Safety Promotion/Fall Prevention:   assistive device/personal item within reach   Fall Risk reviewed with patient/family  Goal: Optimal Comfort and Wellbeing  Outcome: Ongoing, Progressing  Intervention: Monitor Pain and Promote Comfort  Flowsheets (Taken 2/15/2023 0558)  Pain Management Interventions: pain management plan reviewed with patient/caregiver     Problem: Skin Injury Risk Increased  Goal: Skin Health and Integrity  Outcome: Ongoing, Progressing

## 2023-02-15 NOTE — PROGRESS NOTES
"                                                        [facility]                                  Skilled Nursing Facility                   Progress Note     Admit Date: 2/14/2023  BRAYAN   Principal Problem:  Hepatic encephalopathy   HPI obtained from patient interview and chart review     Chief Complaint: Establish Care/ Initial Visit     HPI:   Ms. Hamilton is a 57 year old female PMHx of alcoholic cirrhosis, hepatic encephalopathy, seizure, bipolar disorder who presents to SNF following hospitalization for hepatic encephalopathy with UTI.  Admission to SNF for secondary weakness and debility.     patient originally presented to [facility] ED on 02/07 with altered mental status.  Per [facility], History taken from chart review given patient only able to state that she is confused.  Per report, EMS was called by patient's sister given concern for worsening mental status.  Sister also states patient has become less able take care of herself and manage her medications.  Believes patient has not taking her lactulose in over a week.  In the ED, VSS.  Labs notable for creatinine 0.4, hemoglobin 12.5, bilirubin 3.1, platelets 59, INR 1.6, ammonia 131.  CT head unremarkable.  Patient admitted to Hospital Medicine, UA + UC pending. stared on IV ceftriaxone . CX ray -No significant intrathoracic abnormality.and BCx 2 . sono abdomen with no ascites. Experience SVT in the 160s, asymptomatic improved with  1L NS bolus initiated and carotid massage . converted to NSR on EKG. 9 episodes of BMs. lactulose held. severe metabolic acidosis likely from diarrhea. started on sodium bicarbonate drip . BCX 2 NGTD.UCx with E coli sensitive to ceftriaxone and Enterococcus sensitive to amoxicillin. Ammonia trended down to 58. Bicarb at 21. Saldaña cath discontinued. lactulose increased to 30g TID." PT/OT recommending SNF stay.    Today, patient patient's mentation has improved.  She is able to state her name, date of birth, year and " place.  She does report incorrect age of 57.  Patient's blood pressures are soft at this time, he is asymptomatic.  She reports no bowel movements yet today but having had 4 yesterday.  Patient refused her lactulose this morning.    Patient will be treated at [facility] with PT and OT to improve functional status and ability to perform ADLs.     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    Past Surgical History: Patient has a past surgical history that includes Esophagogastroduodenoscopy and Cosmetic surgery.    Social History: Patient reports that she has never smoked. She has never used smokeless tobacco. She reports that she does not currently use alcohol. She reports that she does not use drugs.    Family History: family history includes Alcohol abuse in her father, paternal grandfather, and sister; Bipolar disorder in her father; Hypertension in her mother; Suicide in her father.    Allergies: Patient is allergic to sulfa (sulfonamide antibiotics) and codeine.    ROS  Constitutional: Negative for fever   Eyes: Negative for blurred vision, double vision   Respiratory: Negative for cough,  shortness of breath   Cardiovascular: Negative for chest pain, palpitations, and leg swelling.   Gastrointestinal: Negative for abdominal pain, constipation, diarrhea, nausea, vomiting.   Genitourinary: Negative for dysuria, frequency   Musculoskeletal:  + generalized weakness. Negative for back pain and myalgias.   Skin: Negative for itching and rash.   Neurological: Negative for dizziness, headaches.   Psychiatric/Behavioral: +  for depression. The patient is not nervous/anxious.      24 hour Vital Sign Range   Temp:  [98.6 °F (37 °C)-99.1 °F (37.3 °C)]   Pulse:  [73-81]   Resp:  [18]   BP: (101-112)/(51-59)   SpO2:  [92 %-95 %]     Current BMI: Body mass index is 18.9 kg/m².    PEx  Constitutional: Patient appears debilitated.  No distress noted  HENT:   Head: Normocephalic and atraumatic.   Eyes: Pupils are equal, round  Neck: Normal range of motion. Neck supple.   Cardiovascular: Normal rate, regular rhythm and normal heart sounds.    Pulmonary/Chest: Effort normal and breath sounds are clear  Abdominal: Soft. Bowel sounds are normal.   Musculoskeletal: Normal range of motion.   Neurological: Alert and oriented to person, place, and time.   Psychiatric: Normal mood and affect. Behavior is normal.   Skin: Skin is warm and dry. Full skin assessment completed.  Scattered areas of ecchymosis noted, Blanchable redness to sacrum area    No results for input(s): GLUCOSE, NA, K, CL, CO2, BUN, CREATININE, MG in the last 24 hours.    Invalid input(s):  CALCIUM    No results for input(s): WBC, RBC, HGB, HCT, PLT, MCV, MCH, MCHC in the last 24 hours.      Assessment and Plan:    Cirrhosis, Laennec's  Hepatic encephalopathy (resolved)  - continue lactulose 30g TID with goal of 3-4 bowel movements daily  - Holding home Lasix given concern for dehydration  - Continue spironolactone 25 mg daily  - continue propanolol  - Low-sodium diet, fluid restriction 1500 mL  - Nutrition consulted  - Mentation back to baseline  -  PT/OT    Metabolic acidosis  - continue sodium bicarbonate 1300 mg BID     UTI (urinary tract infection)  - 2/9  UA + UC pending. stared on IV ceftriaxone .   - 2/10 . UC - GRAM NEGATIVE BILLY >100,000 cfu/ml Identification and susceptibility pending .ENTEROCOCCUS SPECIES > 100,000 cfu/ml dentification and susceptibility pending. started on amoxicillin  - 2/12 E coli sensitive to ceftriaxone and Enterococcus sensitive to amoxicillin  - complete 2 more doses of amoxicillin at SNF to complete UTI treatment     Bipolar 1 disorder, depressed, severe  - Continue home lithium 3 mg qHS., Abilify 10 mg qHS, Seroquel 100 mg qHS.     Malnutrition  - RD consulted  - continue multivitamin     Anemia  - macrocytic anemia.  - Hemoglobin stable.  - Etiology likely due to chronic disease  - continue folic acid, thiamine  - continue monitor twice weekly CBCs, transfuse for hemoglobin < 7    History of seizure  - Continue home Keppra 1000 mg BID      Thrombocytopenia  - Secondary to cirrhosis   - platelet counts low but stable  - continue monitor twice weekly CBC    GERD  - continue Protonix 40 mg daily    Debility   - Continue with PT/OT for gait training and strengthening and restoration of ADL's   - Encourage mobility, OOB in chair, and early ambulation as appropriate  - Fall precautions   - Monitor for bowel and bladder dysfunction  - Monitor for and prevent skin breakdown and pressure ulcers  - Continue DVT prophylaxis with frequent ambulation, chemical DVT prophylaxis not indicated in this patient with coagulopathies      Anticipate disposition:  Home with home health      Follow-up needed during SNF stay-    Follow-up needed after discharge from SNF: PCP, hepatology    No future appointments.      I certify that SNF services are required to be given on an inpatient basis because Marely Hamilton needs for skilled nursing care and/or skilled rehabilitation are required on a daily basis and such services can only practically be  provided in a skilled nursing facility setting and are for an ongoing condition for which she received inpatient care in the hospital.     Total time of the visit 112 minutes [id number]   Non physical exam/ non charting time: 70 minutes   Description of non physical exam/non charting time: counseling patient on clinical conditions and therapies provided regarding importance of medication compliance, particularly lactulose, importance of proper nutrition and hydration, participation with therapy, follow-up appointments in the beginning of discharge planning.  Extensive chart review completed including all consultation notes.  All pertinent laboratory and radiographical images reviewed.        Robina Esquivel NP  Department of Hospital Medicine   [facility]     DOS: 2/15/2023       Patient note was created using MModal Dictation.  Any errors in syntax or even information may not have been identified and edited on initial review prior to signing this note.

## 2023-02-16 LAB
ALBUMIN SERPL BCP-MCNC: 2.2 G/DL (ref 3.5–5.2)
ALP SERPL-CCNC: 94 U/L (ref 55–135)
ALT SERPL W/O P-5'-P-CCNC: 18 U/L (ref 10–44)
AMMONIA PLAS-SCNC: 61 UMOL/L (ref 10–50)
ANION GAP SERPL CALC-SCNC: 12 MMOL/L (ref 8–16)
ANISOCYTOSIS BLD QL SMEAR: SLIGHT
AST SERPL-CCNC: 43 U/L (ref 10–40)
BACTERIA #/AREA URNS AUTO: ABNORMAL /HPF
BASOPHILS # BLD AUTO: 0.01 K/UL (ref 0–0.2)
BASOPHILS NFR BLD: 0.7 % (ref 0–1.9)
BILIRUB SERPL-MCNC: 2.7 MG/DL (ref 0.1–1)
BILIRUB UR QL STRIP: NEGATIVE
BUN SERPL-MCNC: 5 MG/DL (ref 6–20)
BURR CELLS BLD QL SMEAR: ABNORMAL
CALCIUM SERPL-MCNC: 8.3 MG/DL (ref 8.7–10.5)
CHLORIDE SERPL-SCNC: 107 MMOL/L (ref 95–110)
CLARITY UR REFRACT.AUTO: CLEAR
CO2 SERPL-SCNC: 18 MMOL/L (ref 23–29)
COLOR UR AUTO: YELLOW
CREAT SERPL-MCNC: 0.5 MG/DL (ref 0.5–1.4)
DIFFERENTIAL METHOD: ABNORMAL
EOSINOPHIL # BLD AUTO: 0 K/UL (ref 0–0.5)
EOSINOPHIL NFR BLD: 2.9 % (ref 0–8)
ERYTHROCYTE [DISTWIDTH] IN BLOOD BY AUTOMATED COUNT: 16.3 % (ref 11.5–14.5)
EST. GFR  (NO RACE VARIABLE): >60 ML/MIN/1.73 M^2
GLUCOSE SERPL-MCNC: 65 MG/DL (ref 70–110)
GLUCOSE UR QL STRIP: NEGATIVE
HCT VFR BLD AUTO: 25.9 % (ref 37–48.5)
HGB BLD-MCNC: 8.1 G/DL (ref 12–16)
HGB UR QL STRIP: NEGATIVE
IMM GRANULOCYTES # BLD AUTO: 0 K/UL (ref 0–0.04)
IMM GRANULOCYTES NFR BLD AUTO: 0 % (ref 0–0.5)
KETONES UR QL STRIP: NEGATIVE
LEUKOCYTE ESTERASE UR QL STRIP: ABNORMAL
LYMPHOCYTES # BLD AUTO: 0.5 K/UL (ref 1–4.8)
LYMPHOCYTES NFR BLD: 35 % (ref 18–48)
MAGNESIUM SERPL-MCNC: 1.6 MG/DL (ref 1.6–2.6)
MCH RBC QN AUTO: 33.2 PG (ref 27–31)
MCHC RBC AUTO-ENTMCNC: 31.3 G/DL (ref 32–36)
MCV RBC AUTO: 106 FL (ref 82–98)
MICROSCOPIC COMMENT: ABNORMAL
MONOCYTES # BLD AUTO: 0.1 K/UL (ref 0.3–1)
MONOCYTES NFR BLD: 8.8 % (ref 4–15)
NEUTROPHILS # BLD AUTO: 0.7 K/UL (ref 1.8–7.7)
NEUTROPHILS NFR BLD: 52.6 % (ref 38–73)
NITRITE UR QL STRIP: NEGATIVE
NON-SQ EPI CELLS #/AREA URNS AUTO: 1 /HPF
NRBC BLD-RTO: 0 /100 WBC
OVALOCYTES BLD QL SMEAR: ABNORMAL
PH UR STRIP: 6 [PH] (ref 5–8)
PHOSPHATE SERPL-MCNC: 2.8 MG/DL (ref 2.7–4.5)
PLATELET # BLD AUTO: 40 K/UL (ref 150–450)
PLATELET BLD QL SMEAR: ABNORMAL
PMV BLD AUTO: 10.6 FL (ref 9.2–12.9)
POIKILOCYTOSIS BLD QL SMEAR: ABNORMAL
POTASSIUM SERPL-SCNC: 3.5 MMOL/L (ref 3.5–5.1)
PROT SERPL-MCNC: 4.4 G/DL (ref 6–8.4)
PROT UR QL STRIP: NEGATIVE
RBC # BLD AUTO: 2.44 M/UL (ref 4–5.4)
RBC #/AREA URNS AUTO: 3 /HPF (ref 0–4)
SMUDGE CELLS BLD QL SMEAR: PRESENT
SODIUM SERPL-SCNC: 137 MMOL/L (ref 136–145)
SP GR UR STRIP: 1.01 (ref 1–1.03)
SQUAMOUS #/AREA URNS AUTO: 2 /HPF
URN SPEC COLLECT METH UR: ABNORMAL
WBC # BLD AUTO: 1.37 K/UL (ref 3.9–12.7)
WBC #/AREA URNS AUTO: 29 /HPF (ref 0–5)
YEAST UR QL AUTO: ABNORMAL

## 2023-02-16 PROCEDURE — 85025 COMPLETE CBC W/AUTO DIFF WBC: CPT | Performed by: HOSPITALIST

## 2023-02-16 PROCEDURE — 97530 THERAPEUTIC ACTIVITIES: CPT

## 2023-02-16 PROCEDURE — 87040 BLOOD CULTURE FOR BACTERIA: CPT | Performed by: NURSE PRACTITIONER

## 2023-02-16 PROCEDURE — 97110 THERAPEUTIC EXERCISES: CPT

## 2023-02-16 PROCEDURE — 36415 COLL VENOUS BLD VENIPUNCTURE: CPT | Performed by: HOSPITALIST

## 2023-02-16 PROCEDURE — 84100 ASSAY OF PHOSPHORUS: CPT | Performed by: HOSPITALIST

## 2023-02-16 PROCEDURE — 11000004 HC SNF PRIVATE

## 2023-02-16 PROCEDURE — 25000003 PHARM REV CODE 250: Performed by: HOSPITALIST

## 2023-02-16 PROCEDURE — 97535 SELF CARE MNGMENT TRAINING: CPT

## 2023-02-16 PROCEDURE — 97116 GAIT TRAINING THERAPY: CPT

## 2023-02-16 PROCEDURE — 87088 URINE BACTERIA CULTURE: CPT | Performed by: NURSE PRACTITIONER

## 2023-02-16 PROCEDURE — 83735 ASSAY OF MAGNESIUM: CPT | Performed by: HOSPITALIST

## 2023-02-16 PROCEDURE — 87086 URINE CULTURE/COLONY COUNT: CPT | Performed by: NURSE PRACTITIONER

## 2023-02-16 PROCEDURE — 80053 COMPREHEN METABOLIC PANEL: CPT | Performed by: NURSE PRACTITIONER

## 2023-02-16 PROCEDURE — 81001 URINALYSIS AUTO W/SCOPE: CPT | Performed by: NURSE PRACTITIONER

## 2023-02-16 PROCEDURE — 87077 CULTURE AEROBIC IDENTIFY: CPT | Performed by: NURSE PRACTITIONER

## 2023-02-16 PROCEDURE — 36415 COLL VENOUS BLD VENIPUNCTURE: CPT | Performed by: NURSE PRACTITIONER

## 2023-02-16 PROCEDURE — 87186 SC STD MICRODIL/AGAR DIL: CPT | Performed by: NURSE PRACTITIONER

## 2023-02-16 PROCEDURE — 82140 ASSAY OF AMMONIA: CPT | Performed by: NURSE PRACTITIONER

## 2023-02-16 RX ADMIN — LEVETIRACETAM 1000 MG: 500 TABLET, FILM COATED ORAL at 10:02

## 2023-02-16 RX ADMIN — PANTOPRAZOLE SODIUM 40 MG: 40 TABLET, DELAYED RELEASE ORAL at 10:02

## 2023-02-16 RX ADMIN — PROPRANOLOL HYDROCHLORIDE 20 MG: 10 TABLET ORAL at 10:02

## 2023-02-16 RX ADMIN — LACTULOSE 30 G: 20 SOLUTION ORAL at 09:02

## 2023-02-16 RX ADMIN — SODIUM BICARBONATE 1300 MG: 650 TABLET ORAL at 10:02

## 2023-02-16 RX ADMIN — QUETIAPINE FUMARATE 100 MG: 25 TABLET ORAL at 05:02

## 2023-02-16 RX ADMIN — SPIRONOLACTONE 25 MG: 25 TABLET, FILM COATED ORAL at 10:02

## 2023-02-16 RX ADMIN — FOLIC ACID 1 MG: 1 TABLET ORAL at 10:02

## 2023-02-16 RX ADMIN — LEVETIRACETAM 1000 MG: 500 TABLET, FILM COATED ORAL at 08:02

## 2023-02-16 RX ADMIN — THERA TABS 1 TABLET: TAB at 10:02

## 2023-02-16 RX ADMIN — SODIUM BICARBONATE 1300 MG: 650 TABLET ORAL at 08:02

## 2023-02-16 RX ADMIN — THIAMINE HCL TAB 100 MG 100 MG: 100 TAB at 10:02

## 2023-02-16 RX ADMIN — ARIPIPRAZOLE 10 MG: 5 TABLET ORAL at 08:02

## 2023-02-16 RX ADMIN — HYDROXYZINE HYDROCHLORIDE 50 MG: 25 TABLET, FILM COATED ORAL at 08:02

## 2023-02-16 RX ADMIN — LITHIUM CARBONATE 300 MG: 300 TABLET, FILM COATED, EXTENDED RELEASE ORAL at 08:02

## 2023-02-16 RX ADMIN — LACTULOSE 30 G: 20 SOLUTION ORAL at 03:02

## 2023-02-16 NOTE — CONSULTS
[facility] - Skilled Nursing  Adult Nutrition  Consult Note    SUMMARY   Recommendations  Continue low sodium diet, add boost plus daily, chocolate, RD following  Goals: PO to meet 75% of needs with ONS by next RD follow up  Nutrition Goal Status: new  Communication of RD Recs: reviewed with physician    Assessment and Plan    Nutrition Problem:  Moderate  Protein-Calorie Malnutrition  Malnutrition in the context of  Chronic Illness/Injury    Related to (etiology):  Inadequate oral intake in the presence of altered mental status     Signs and Symptoms (as evidenced by):  Energy Intake: <75% of estimated energy requirement for > one month  Body Fat Depletion: moderate depletion of orbitals and triceps mild, chest  Muscle Mass Depletion: moderate depletion of temples, clavicle region, interosseous muscle and lower extremities       Interventions(treatment strategy):  Mineral modified diet- low sodium  Commercial beverage( protein, calories) boost plus daily, chocolate  Collaboration with other providers    Nutrition Diagnosis Status:  Continues      Malnutrition Assessment 2/16     Skin (Micronutrient): dry, cracked, bruised  Nails (Micronutrient): clubbed  Lips/Mucous Membranes (Micronutrient): pallor  Teeth (Micronutrient): broken dentition, other (see comments) (painful chewing)  Tongue (Micronutrient): red  Neck/Chest (Micronutrient): muscle wasting, subcutaneous fat loss  Musculoskeletal/Lower Extremities: muscle wasting, subcutaneous fat loss           Orbital Region (Subcutaneous Fat Loss): moderate depletion  Upper Arm Region (Subcutaneous Fat Loss): moderate depletion (severe buccal)  Thoracic and Lumbar Region: mild depletion   [place] Region (Muscle Loss): moderate depletion  Clavicle Bone Region (Muscle Loss): moderate depletion  Clavicle and Acromion Bone Region (Muscle Loss): severe depletion  Dorsal Hand (Muscle Loss): severe depletion  Patellar Region (Muscle Loss): mild depletion  Anterior Thigh  "Region (Muscle Loss): mild depletion  Posterior Calf Region (Muscle Loss): well nourished   Edema (Fluid Accumulation): 0-->no edema present             Reason for Assessment    Reason For Assessment: consult  Diagnosis:  (hepatic encephalopathy)  Interdisciplinary Rounds: did not attend  General Information Comments: Lives with mother , they both cook and shop. Patient reports good appetite, does not like hospital food, will take boost plus chocolate, takes one at home daily. NFPE completed with moderate to severe losses.  Nutrition Discharge Planning: DC low sodium diet, oral supplement of choice    Nutrition/Diet History    Patient Reported Diet/Restrictions/Preferences: general  Typical Food/Fluid Intake: 2 meals per day  Food Preferences: pasta, sandwiches  Spiritual, Cultural Beliefs, Baptist Practices, Values that Affect Care: no  Supplemental Drinks or Food Habits: Boost Plus  Vitamin/Mineral/Herbal Supplements: Multlivitamin/mineral  Food Allergies: NKFA    Anthropometrics    Temp: 98.7 °F (37.1 °C)  Height Method: Stated  Height: 5' 3" (160 cm)  Height (inches): 63 in  Weight Method: Standard Scale  Weight: 47.5 kg (104 lb 11.5 oz)  Weight (lb): 104.72 lb  Ideal Body Weight (IBW), Female: 115 lb  % Ideal Body Weight, Female (lb): 91.06 %  BMI (Calculated): 18.6  BMI Grade: 18.5-24.9 - normal  Weight Loss: unintentional  Usual Body Weight (UBW), k.36 kg  Weight Change Amount:  (pt reports ten pound weight loss this past year.started boost plus)  % Usual Body Weight: 92.68  % Weight Change From Usual Weight: -7.52 %       Lab/Procedures/Meds    Pertinent Labs Reviewed: reviewed  Pertinent Labs Comments: Hg 8.1, Hct 25.9, glucose 65, albumin 2.2, ammonia 58, Ca 8.3  Pertinent Medications Reviewed: reviewed  Pertinent Medications Comments: fiolic acid, thiamine, Mvi, lithium, lactulose, spironolactone, pantoprazole    Estimated/Assessed Needs    Weight Used For Calorie Calculations: 47.5 kg (104 lb 11.5 " oz)  Energy Calorie Requirements (kcal): 1662  Energy Need Method: Kcal/kg (35 kcal/kg)  Protein Requirements: 57g-71g  Weight Used For Protein Calculations: 47.5 kg (104 lb 11.5 oz) (x 1.2-1.5 g/kg)  Fluid Requirements (mL): 1662 or per MD  Estimated Fluid Requirement Method: RDA Method  RDA Method (mL): 1662  CHO Requirement: -      Nutrition Prescription Ordered  Low sodium   Current Diet Order: Low sodium  Nutrition Order Comments: does not like the food  Oral Nutrition Supplement: -    Evaluation of Received Nutrient/Fluid Intake    Energy Calories Required: not meeting needs  Protein Required: not meeting needs  Fluid Required: meeting needs  Comments: LBM 2/16  Tolerance: tolerating  % Intake of Estimated Energy Needs: 50-75%  % Meal Intake: 50 - 75 %    Nutrition Risk    Level of Risk/Frequency of Follow-up: high (two times per week)       Monitor and Evaluation    Food and Nutrient Intake: food and beverage intake  Food and Nutrient Adminstration: diet order  Physical Activity and Function: nutrition-related ADLs and IADLs  Anthropometric Measurements: weight change  Biochemical Data, Medical Tests and Procedures: electrolyte and renal panel, inflammatory profile, gastrointestinal profile  Nutrition-Focused Physical Findings: overall appearance       Nutrition Follow-Up    RD Follow-up?: Yes

## 2023-02-16 NOTE — PT/OT/SLP PROGRESS
Occupational Therapy   Treatment    Name: Marely Hamilton  MRN: [id number]  Admit Date: 2/14/2023  Admitting Diagnosis:  Hepatic encephalopathy    General Precautions: Standard, fall   Orthopedic Precautions: N/A   Braces: N/A    Recommendations:     Discharge Recommendations:  home health OT  Level of Assistance Recommended at Discharge: Intermittent assistance for ADL's and homemaking tasks  Discharge Equipment Recommendations:  (TBD)  Barriers to discharge:  Decreased caregiver support    Assessment:     Marely Hamilton is a 58 y.o. female with a medical diagnosis of Hepatic encephalopathy.  She presents with good participation and motivation. Pt demonstrated poor safety awareness and RW management requiring cues for safety with transfers. Pt brought to therapy gym beginning of session however returned to room for X-ray. Remainder of OT session completed on increasing functional independence with ADLs and functional mobility/transfers in room. Pt continues to require (A) with all activities & is at risk for falls. Goals remain appropriate. . Performance deficits affecting function are weakness, impaired endurance, impaired self care skills, impaired functional mobility, gait instability, impaired balance, impaired cognition, decreased upper extremity function, decreased lower extremity function, decreased safety awareness, impaired cardiopulmonary response to activity.     Rehab Potential is good    Activity tolerance:  Good    Plan:     Patient to be seen 6 x/week to address the above listed problems via self-care/home management, therapeutic activities, therapeutic exercises    Plan of Care Expires: 03/17/23  Plan of Care Reviewed with: patient    Subjective     Communicated with: RN prior to session.     Pain/Comfort:  Pain Rating 1: 0/10  Pain Rating Post-Intervention 1: 0/10    Patient's cultural, spiritual, Faith conflicts given the current situation:  no    Objective:     Patient found sitting edge of  bed with  (no lines attached) upon OT entry to room.    Bed Mobility:    Patient completed Scooting/Bridging with stand by assistance     Functional Mobility/Transfers:  Patient completed Sit <> Stand Transfer with contact guard assistance  with  rolling walker   From EOB and wheelchair  Patient completed Bed > Wheelchair Transfer with ambulation ~6 ft with minimum assistance with rolling walker  Patient completed Toilet Transfer Step Transfer technique with minimum assistance with  rolling walker  Functional Mobility: Pt completed functional ambulation to/from toilet (BSC over) with minimum assistance and RW  Pt demonstrated impaired RW management and multiple attempts to abandon device. Cues for proper use to maintain safety  No overt LOB however unsteadiness observed  Wheelchair Mobility: Pt propelled standard wheelchair using B UE from hallway to therapy gym ~30 ft with minimum assistance for steering    Activities of Daily Living:  Upper Body Dressing: minimum assistance and verbal cues for orientation to don/doff scrub top seated on wheelchair  Lower Body Dressing: minimum assistance required to initiate threading scrub pants onto b/l feet and manipulate above hips in standing RW    AMPAC 6 Click ADL: 19    OT Exercises: UE Ergometer 5 minutes forward and 3 minutes backward on minimal-moderate resistance. Performed to improve B UE strength/endurance required for engagement in functional activities of choice.    Treatment & Education:  -Education on energy conservation and task modification to maximize safety and (I) during ADLs and mobility  -Education on importance of OOB activity to improve overall activity tolerance and promote recovery  -Pt educated to call for assistance and to transfer with hospital staff only  -Provided education regarding role of OT, POC, & discharge recommendations with pt verbalizing understanding.  Pt had no further questions & when asked whether there were any concerns pt reported  none.     Patient left up in chair with call button in reach and RN notified    GOALS:   Multidisciplinary Problems       Occupational Therapy Goals          Problem: Occupational Therapy    Goal Priority Disciplines Outcome Interventions   Occupational Therapy Goal     OT, PT/OT Ongoing, Progressing    Description: Goals to be met by: 3/17/2023     Patient will increase functional independence with ADLs by performing:    UE Dressing with [place].  LE Dressing with [place].  Grooming while standing at sink with Modified [place].  Toileting from toilet with Modified [place] for hygiene and clothing management.   Bathing from  shower chair/bench with Stand-by Assistance.  Supine to sit with [place].  Step transfer with Modified [place] with RW.  Toilet transfer to toilet with Modified [place] with RW.                         Time Tracking:     OT Date of Treatment: 02/16/23  OT Start Time: 1300    OT Stop Time: 1330  OT Total Time (min): 30 min    Billable Minutes:Self Care/Home Management 14  Therapeutic Activity 16    2/16/2023

## 2023-02-16 NOTE — PROGRESS NOTES
[facility]                                  Skilled Nursing Facility                   Progress Note     Admit Date: 2/14/2023  BRAYAN   Principal Problem:  Hepatic encephalopathy   HPI obtained from patient interview and chart review     Chief Complaint: Re-evaluation of medical treatment and therapy status: Lab review, confusion    HPI:   Ms. Hamilton is a 57 year old female PMHx of alcoholic cirrhosis, hepatic encephalopathy, seizure, bipolar disorder who presents to SNF following hospitalization for hepatic encephalopathy with UTI.  Admission to SNF for secondary weakness and debility.    Interval history: All of today's labs reviewed and are listed below.  24 hr vital sign ranges listed below.  Patient with worsening leukopenia.  Patient's sister requested a phone call, patient's sister states that she is not acting herself, patient using grandiose phrases.  Will order infectious workup.  Patient denies shortness of breath, abdominal discomfort, nausea, or vomiting.  Patient reports an adequate appetite.  Patient denies dysuria.  Patient reports having regular bowel movements.  Patient progessing with PT/OT. Continuing to follow and treat all acute and chronic conditions.      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    Past Surgical History: Patient has a past surgical history that includes Esophagogastroduodenoscopy and Cosmetic surgery.    Social History: Patient reports that she has never smoked. She has never used smokeless tobacco. She reports that she does not currently use alcohol. She reports that she does not use drugs.    Family History: family history includes Alcohol abuse in her father, paternal grandfather, and sister; Bipolar disorder in her father; Hypertension in her mother; Suicide in her father.    Allergies: Patient is allergic to sulfa (sulfonamide antibiotics) and codeine.    ROS  Constitutional: Negative for fever   Eyes: Negative for blurred vision, double vision   Respiratory: Negative for cough, shortness of breath   Cardiovascular: Negative for chest pain, palpitations, and leg swelling.   Gastrointestinal: Negative for abdominal pain, constipation, diarrhea, nausea, vomiting.   Genitourinary: Negative for dysuria, frequency   Musculoskeletal:  + generalized weakness. Negative for back pain and myalgias.   Skin: Negative for itching and rash.   Neurological: Negative for dizziness, headaches.   Psychiatric/Behavioral: +  for depression. The patient is not nervous/anxious.      24 hour Vital Sign Range   Temp:  [98.1 °F (36.7 °C)-98.7 °F (37.1 °C)]   Pulse:  [80-96]   Resp:  [18]   BP: (108-122)/(53-60)   SpO2:  [96 %-99 %]     Current BMI: Body mass index is 18.55 kg/m².    PEx  Constitutional: Patient appears debilitated.  No distress noted  HENT:   Head: Normocephalic and atraumatic.   Eyes: Pupils are equal, round  Neck: Normal range of motion. Neck supple.   Cardiovascular: Normal rate, regular rhythm and normal heart sounds.    Pulmonary/Chest: Effort normal and breath sounds are  clear  Abdominal: Soft. Bowel sounds are normal.   Musculoskeletal: Normal range of motion.   Neurological: Alert and oriented to person, place, and time.   Psychiatric:  Bizarre mood and affect. Behavior is abnormal.   Skin: Skin is warm and dry.  Scattered areas of ecchymosis noted, Blanchable redness to sacrum area    Recent Labs   Lab 02/16/23  0513      K 3.5      CO2 18*   BUN 5*   CREATININE 0.5   MG 1.6       Recent Labs   Lab 02/16/23  0513   WBC 1.37*   RBC 2.44*   HGB 8.1*   HCT 25.9*   PLT 40*   *   MCH 33.2*   MCHC 31.3*         Assessment and Plan:    Leukopenia, worsening  - ordered:  Blood cultures- NGTD, UAx- reflexing to culture will await results, cxr- neg for acute findings-     Cirrhosis, Laennec's  Hepatic encephalopathy (resolved)  - continue lactulose 30g TID with goal of 3-4 bowel movements daily  - Holding home Lasix given concern for dehydration  - Continue spironolactone 25 mg daily  - continue propanolol  - Low-sodium diet, fluid restriction 1500 mL  - Nutrition consulted  - Mentation back to baseline  - PT/OT  - 2/16 ordered ammonia level, 61 from 68    Metabolic acidosis  - continue sodium bicarbonate 1300 mg BID     UTI (urinary tract infection)  - 2/9  UA + UC pending. stared on IV ceftriaxone .   - 2/10 . UC - GRAM NEGATIVE BILLY >100,000 cfu/ml Identification and susceptibility pending .ENTEROCOCCUS SPECIES > 100,000 cfu/ml dentification and susceptibility pending. started on amoxicillin  - 2/12 E coli sensitive to ceftriaxone and Enterococcus sensitive to amoxicillin  - complete 2 more doses of amoxicillin at SNF to complete UTI treatment     Bipolar 1 disorder, depressed, severe  - Continue home lithium 3 mg qHS., Abilify 10 mg qHS, Seroquel 100 mg qHS.     Malnutrition  - RD consulted  - continue multivitamin     Anemia  - macrocytic anemia.  - Hemoglobin stable.  - Etiology likely due to chronic disease  - continue folic acid, thiamine  - continue monitor twice  weekly CBCs, transfuse for hemoglobin < 7    History of seizure  - Continue home Keppra 1000 mg BID      Thrombocytopenia  - Secondary to cirrhosis   - platelet counts low but stable  - continue monitor twice weekly CBC    GERD  - continue Protonix 40 mg daily    Debility   - Continue with PT/OT for gait training and strengthening and restoration of ADL's   - Encourage mobility, OOB in chair, and early ambulation as appropriate  - Fall precautions   - Monitor for bowel and bladder dysfunction  - Monitor for and prevent skin breakdown and pressure ulcers  - Continue DVT prophylaxis with frequent ambulation, chemical DVT prophylaxis not indicated in this patient with coagulopathies      Anticipate disposition:  Home with home health      Follow-up needed during SNF stay-    Follow-up needed after discharge from SNF: PCP, hepatology    No future appointments.      Total time of the visit 67 minutes  [id number]  Non physical exam/ non charting time: 49 minutes   Description of non physical exam/non charting time:  Phone conversation held with patient's sister.  Care update provided.  Reviewing infectious workup.      Robina Esquivel NP  Department of Hospital Medicine   [facility]     DOS: 2/16/2023       Patient note was created using MModal Dictation.  Any errors in syntax or even information may not have been identified and edited on initial review prior to signing this note.

## 2023-02-16 NOTE — PT/OT/SLP PROGRESS
Physical Therapy Treatment    Patient Name:  Marely Hamilton   MRN:  [id number]  Admit Date: 2/14/2023  Admitting Diagnosis: Hepatic encephalopathy    General Precautions: Standard, fall  Orthopedic Precautions: N/A  Braces: N/A    Recommendations:     Discharge Recommendations: home with home health  Level of Assistance Recommended at Discharge: Intermittent assistance   Discharge Equipment Recommendations: shower chair  Barriers to discharge: Decreased caregiver support    Assessment:     Marely Hamilton is a 58 y.o. female admitted with a medical diagnosis of Hepatic encephalopathy . Pt continues to be motivated to participate in PT and pt was able to increase her GT distance to a total of 230 ft with 2 seated breaks d.t fatigue, and pt needing CGA.pt will benefit from continued SNF rehab to help improve her overall functional mobility and safety.    Performance deficits affecting function: weakness, impaired endurance, impaired self care skills, gait instability, impaired functional mobility, decreased upper extremity function, decreased lower extremity function, impaired balance, impaired cognition, impaired cardiopulmonary response to activity.    Rehab Potential is good    Activity Tolerance: Good    Plan:     Patient to be seen 6 x/week to address the above listed problems via gait training, therapeutic activities, therapeutic exercises, neuromuscular re-education, wheelchair management/training    Plan of Care Expires: 03/17/23  Plan of Care Reviewed with: patient    Subjective     Pt. Agreeable to work with PT.     Pain/Comfort:  Pain Rating 1: 0/10  Pain Rating Post-Intervention 1: 0/10    Patient's cultural, spiritual, Worship conflicts given the current situation:  no    Objective:     Communicated with pt's nurse prior to session.  Patient found up in chair with   upon PT entry to room.     Therapeutic Activities and Exercises: Mini-elipitcal x 10mins set at medium to high resistance to help improve B  "L/E MMT and endurance.  Mulitple transfer training with SBA with RW and with no AD    Functional Mobility:  Transfers:     Sit to Stand:  stand by assistance with no AD and rolling walker  Bed to Chair: stand by assistance with  no AD  using  Stand Pivot  Gait: 73ft +74ft +83ft with RW and CGA 2* to pt with very short step length, decrease alex and decrease push off.  Stairs:  Pt ascended/descended 4" curb step with Rolling Walker with no handrails with Minimal Assistance.   Wheelchair Propulsion:  Pt propelled Standard wheelchair x ~60 feet on Level tile with  Bilateral upper extremity with Moderate Assistance.     Education:  Patient provided with daily orientation and goals of this PT session.  Patient educated to transfer to bedside chair/bedside commode/bathroom with RN/PCT present.   Patient educated on Fall risk and plan of care by explanation.   Patient Verbalized Understanding.  Time provided for therapeutic counseling and discussion of current health disposition. All questions answered to satisfaction, within scope of PT practice; voiced no other concerns. White board updated in patient's room, RN notified of session.     AM-PAC 6 CLICK MOBILITY  17    Patient left up in chair with call button in reach.    GOALS:   Multidisciplinary Problems       Physical Therapy Goals          Problem: Physical Therapy    Goal Priority Disciplines Outcome Goal Variances Interventions   Physical Therapy Goal     PT, PT/OT Ongoing, Progressing     Description: Goals to be met by: 3/17/23     Patient will increase functional independence with mobility by performing:    . Supine to sit with [place]  . Sit to supine with [place]  . Rolling to Left and Right with [place].  . Sit to stand transfer with Supervision  . Bed to chair transfer with Supervision using No Assistive Device  . Gait  x 150 feet with Supervision using Rolling Walker.   . Wheelchair propulsion x100 feet with Contact Guard Assistance using " bilateral uppper extremities  . Ascend/descend 4 stair with bilateral Handrails Contact Guard Assistance using No Assistive Device.   . Ascend/Descend 4 inch curb step with Contact Guard Assistance using Rolling Walker.                         Time Tracking:     PT Received On: 02/16/23  PT Start Time: 0840  PT Stop Time: 0918  PT Total Time (min): 38 min    Billable Minutes: Gait Training 15, Therapeutic Activity 1-, and Therapeutic Exercise 15    Treatment Type: Treatment  PT/PTA: PT     PTA Visit Number: 0     02/16/2023

## 2023-02-16 NOTE — CLINICAL REVIEW
"Clinical Pharmacy Chart Review Note      Admit Date: 2/14/2023   LOS: 2 days       Marely Hamilton is a 58 y.o. female admitted to SNF for PT/OT after hospitalization for hepatic encephalopathy.    Active Hospital Problems    Diagnosis  POA    *Hepatic encephalopathy [K76.82]  Yes    Metabolic acidosis [E87.20]  Yes    UTI (urinary tract infection) [N39.0]  Yes    Bipolar disorder, manic [F31.10]  Yes    Bipolar 1 disorder [F31.9]  Yes    Elevated LFTs [R79.89]  Yes    Weakness [R53.1]  Yes    Malnutrition [E46]  Yes    Chronic liver failure [K72.11]  Yes    Anemia [D64.9]  Yes     6 units PRBC since June 2015      History of seizure [Z87.898]  Yes     One seizure 2013- "low blood sugar from a starvation diet"      Thrombocytopenia [D69.6]  Yes    Cirrhosis, Laennec's [K70.30]  Yes      Resolved Hospital Problems   No resolved problems to display.     Review of patient's allergies indicates:   Allergen Reactions    Sulfa (sulfonamide antibiotics) Rash    Codeine Nausea And Vomiting     Patient Active Problem List    Diagnosis Date Noted    SVT (supraventricular tachycardia) 02/10/2023    Diarrhea 02/10/2023    Metabolic acidosis 02/10/2023    Hypophosphatemia 02/09/2023    Elevated INR 02/09/2023    UTI (urinary tract infection) 11/03/2021    Hypokalemia 11/03/2021    Bipolar disorder, manic 01/21/2021    Bipolar 1 disorder 06/09/2020    Elevated LFTs 06/03/2020    Inadequate dietary intake of protein 05/19/2020    Bipolar 1 disorder, depressed, severe     Chronic liver failure 10/11/2015    Malnutrition 10/11/2015    Weakness 10/11/2015    Hepatic encephalopathy 10/11/2015    Anemia 09/18/2015    Cirrhosis, Laennec's 06/15/2015    Thrombocytopenia 06/15/2015    History of seizure 06/15/2015    Glaucoma 06/15/2015    hx of intentional Tylenol overdose 06/15/2015    Alcohol abuse, in remission 06/15/2015       Scheduled Meds:    ARIPiprazole  10 mg Oral Nightly    folic acid  1 mg Oral Daily    hydrOXYzine HCL  50 " mg Oral Nightly    lactulose  30 g Oral TID    levETIRAcetam  1,000 mg Oral BID    lithium  300 mg Oral Nightly    multivitamin  1 tablet Oral Daily    pantoprazole  40 mg Oral Daily    propranoloL  20 mg Oral Daily    QUEtiapine  100 mg Oral Daily PM    sodium bicarbonate  1,300 mg Oral BID    spironolactone  25 mg Oral Daily    thiamine  100 mg Oral Daily     Continuous Infusions:   PRN Meds: acetaminophen, calcium carbonate, melatonin    OBJECTIVE:     Vital Signs (Last 24H)  Temp:  [98.1 °F (36.7 °C)-98.7 °F (37.1 °C)]   Pulse:  [80-96]   Resp:  [18]   BP: (108-122)/(53-60)   SpO2:  [96 %-99 %]     Laboratory:  CBC:   Recent Labs   Lab 02/13/23 0338 02/14/23 0457 02/16/23 0513   WBC 2.03* 1.96* 1.37*   RBC 2.83* 2.85* 2.44*   HGB 9.4* 9.6* 8.1*   HCT 28.5* 30.4* 25.9*   PLT 58* 50* 40*   * 107* 106*   MCH 33.2* 33.7* 33.2*   MCHC 33.0 31.6* 31.3*     BMP:   Recent Labs   Lab 02/13/23 0338 02/14/23 0457 02/16/23  0513   * 86 65*    135* 137   K 4.0 3.7 3.5    108 107   CO2 21* 20* 18*   BUN 6 5* 5*   CREATININE 0.6 0.5 0.5   CALCIUM 8.7 8.7 8.3*   MG 1.8 1.6 1.6     CMP:   Recent Labs   Lab 02/13/23 0338 02/14/23 0457 02/16/23  0513   * 86 65*   CALCIUM 8.7 8.7 8.3*   ALBUMIN 2.2* 2.3* 2.2*   PROT 4.5* 4.7* 4.4*    135* 137   K 4.0 3.7 3.5   CO2 21* 20* 18*    108 107   BUN 6 5* 5*   CREATININE 0.6 0.5 0.5   ALKPHOS 129 110 94   ALT 20 20 18   AST 44* 45* 43*   BILITOT 2.1* 2.5* 2.7*     LFTs:   Recent Labs   Lab 02/13/23  0338 02/14/23  0457 02/16/23  0513   ALT 20 20 18   AST 44* 45* 43*   ALKPHOS 129 110 94   BILITOT 2.1* 2.5* 2.7*   PROT 4.5* 4.7* 4.4*   ALBUMIN 2.2* 2.3* 2.2*     Lab Results   Component Value Date    LITHIUM 0.6 02/08/2023     02/16/2023    BUN 5 (L) 02/16/2023    CREATININE 0.5 02/16/2023    TSH 0.361 (L) 02/07/2023    WBC 1.37 (LL) 02/16/2023           ASSESSMENT/PLAN:     I have reviewed the medications in compliance with CMS  "Regulation F756 of the [place]. Based on information gathered, the following items may need to be addressed:    **According to PMH and home medication list, patient takes the following medications at home. These medications are not currently ordered at [facility]:  Furosemide 10 mg twice daily    **Patient is taking lithium, quetiapine, and aripiprazole (home meds) for bipolar disorder. A gradual dose reduction is not recommended at this time. Patient to follow-up with prescribing MD as outpatient.  Monitor: mental status for manic behaviors, depression, suicide ideation, anxiety, hyponatremia, dizziness, drowsiness, somnolence  "Extrapyramidal symptoms (EPS)" such as:  - Akathisia: a distressing feeling of internal restlessness that may appear as constant motion, the inability to sit still, fidgeting, pacing, or rocking.   - Medication-induced Parkinsonism: including tremors, shuffling gait, slowness of movement, expressionless face, drooling, postural unsteadiness and rigidity of muscles in the limbs, neck and trunk.   - Dystonia: acute, painful, spastic contraction of muscle groups (commonly the neck, eyes and trunk) that often occurs soon after initiating treatment and is more common in younger individuals   "Tardive dyskinesia"   - abnormal, recurrent, involuntary movements that may be irreversible and typically present as lateral movements of the tongue or jaw, tongue thrusting, chewing, frequent blinking, brow arching, grimacing, and lip smacking, although the trunk or other parts of the body may also be affected   "Neuroleptic Malignant Syndrome (NMS)"  - typically presents with a sudden onset of diffuse muscle rigidity, high fever, labile blood pressure, tremor, and notable cognitive dysfunction. It is potentially fatal if not treated immediately, including stopping the offending medications       Medications reviewed by PharmD, please re-consult if needed.      Sujatha Holt, Sandy. D.  Clinical " Pharmacist  [facility]

## 2023-02-17 PROCEDURE — 97530 THERAPEUTIC ACTIVITIES: CPT | Mod: CO

## 2023-02-17 PROCEDURE — 97116 GAIT TRAINING THERAPY: CPT | Mod: CQ

## 2023-02-17 PROCEDURE — 25000003 PHARM REV CODE 250: Performed by: HOSPITALIST

## 2023-02-17 PROCEDURE — 97110 THERAPEUTIC EXERCISES: CPT | Mod: CQ

## 2023-02-17 PROCEDURE — 97530 THERAPEUTIC ACTIVITIES: CPT | Mod: CQ

## 2023-02-17 PROCEDURE — 97110 THERAPEUTIC EXERCISES: CPT | Mod: CO

## 2023-02-17 PROCEDURE — 11000004 HC SNF PRIVATE

## 2023-02-17 PROCEDURE — 99306 PR NURSING FACILITY CARE, INIT, HIGH SEVERITY: ICD-10-PCS | Mod: AI,,, | Performed by: HOSPITALIST

## 2023-02-17 PROCEDURE — 99306 1ST NF CARE HIGH MDM 50: CPT | Mod: AI,,, | Performed by: HOSPITALIST

## 2023-02-17 RX ADMIN — SODIUM BICARBONATE 1300 MG: 650 TABLET ORAL at 09:02

## 2023-02-17 RX ADMIN — FOLIC ACID 1 MG: 1 TABLET ORAL at 09:02

## 2023-02-17 RX ADMIN — LITHIUM CARBONATE 300 MG: 300 TABLET, FILM COATED, EXTENDED RELEASE ORAL at 08:02

## 2023-02-17 RX ADMIN — SODIUM BICARBONATE 1300 MG: 650 TABLET ORAL at 08:02

## 2023-02-17 RX ADMIN — ACETAMINOPHEN 650 MG: 325 TABLET ORAL at 08:02

## 2023-02-17 RX ADMIN — SPIRONOLACTONE 25 MG: 25 TABLET, FILM COATED ORAL at 09:02

## 2023-02-17 RX ADMIN — ARIPIPRAZOLE 10 MG: 5 TABLET ORAL at 08:02

## 2023-02-17 RX ADMIN — PANTOPRAZOLE SODIUM 40 MG: 40 TABLET, DELAYED RELEASE ORAL at 09:02

## 2023-02-17 RX ADMIN — Medication 6 MG: at 08:02

## 2023-02-17 RX ADMIN — THERA TABS 1 TABLET: TAB at 09:02

## 2023-02-17 RX ADMIN — LACTULOSE 30 G: 20 SOLUTION ORAL at 02:02

## 2023-02-17 RX ADMIN — THIAMINE HCL TAB 100 MG 100 MG: 100 TAB at 09:02

## 2023-02-17 RX ADMIN — LEVETIRACETAM 1000 MG: 500 TABLET, FILM COATED ORAL at 08:02

## 2023-02-17 RX ADMIN — QUETIAPINE FUMARATE 100 MG: 25 TABLET ORAL at 05:02

## 2023-02-17 RX ADMIN — LEVETIRACETAM 1000 MG: 500 TABLET, FILM COATED ORAL at 09:02

## 2023-02-17 RX ADMIN — LACTULOSE 30 G: 20 SOLUTION ORAL at 09:02

## 2023-02-17 RX ADMIN — HYDROXYZINE HYDROCHLORIDE 50 MG: 25 TABLET, FILM COATED ORAL at 08:02

## 2023-02-17 NOTE — PLAN OF CARE
Problem: Occupational Therapy  Goal: Occupational Therapy Goal  Description: Goals to be met by: 3/17/2023     Patient will increase functional independence with ADLs by performing:    UE Dressing with [place].  LE Dressing with [place].  Grooming while standing at sink with Modified [place].  Toileting from toilet with Modified [place] for hygiene and clothing management.   Bathing from  shower chair/bench with Stand-by Assistance.  Supine to sit with [place].  Step transfer with Modified [place] with RW.  Toilet transfer to toilet with Modified [place] with RW.    Outcome: Ongoing, Progressing

## 2023-02-17 NOTE — H&P
"[facility] Medicine  Skilled Nursing Facility   History and Physical Exam      Date of Service: 02/17/2023      Patient Name: Marely Hamilton  MRN: [id number]  Admission Date: 2/14/2023  Attending Physician: Tim Quan MD  Primary Care Provider: Viktor Ross MD  Code Status: Full Code      Principal problem:  Hepatic encephalopathy      Chief Complaint:   Chief Complaint   Patient presents with    Urinary Tract Infection     Admitted to [facility] for rehabilitation following hospital stay for hepatic encephalopathy and UTI.           HPI:   "Ms. Hamilton is a 57 year old female PMHx of alcoholic cirrhosis, hepatic encephalopathy, seizure, bipolar disorder who presents to SNF following hospitalization for hepatic encephalopathy with UTI.  Admission to SNF for secondary weakness and debility.      patient originally presented to [facility] ED on 02/07 with altered mental status.  Per [facility], History taken from chart review given patient only able to state that she is confused.  Per report, EMS was called by patient's sister given concern for worsening mental status.  Sister also states patient has become less able take care of herself and manage her medications.  Believes patient has not taking her lactulose in over a week.  In the ED, VSS.  Labs notable for creatinine 0.4, hemoglobin 12.5, bilirubin 3.1, platelets 59, INR 1.6, ammonia 131.  CT head unremarkable.  Patient admitted to [facility] Medicine, UA + UC pending. stared on IV ceftriaxone . CX ray -No significant intrathoracic abnormality.and BCx 2 . sono abdomen with no ascites. Experience SVT in the 160s, asymptomatic improved with  1L NS bolus initiated and carotid massage . converted to NSR on EKG. 9 episodes of BMs. lactulose held. severe metabolic acidosis likely from diarrhea. started on sodium bicarbonate drip . BCX 2 NGTD.UCx with E coli sensitive to ceftriaxone and Enterococcus sensitive to amoxicillin. Ammonia trended down to 58. Bicarb at 21. [place] cath " "discontinued. lactulose increased to 30g TID." PT/OT recommending SNF stay.     Today, patient patient's mentation has improved.  She is able to state her name, date of birth, year and place.  She does report incorrect age of 57.  Patient's blood pressures are soft at this time, he is asymptomatic.  She reports no bowel movements yet today but having had 4 yesterday.  Patient refused her lactulose this morning." Per Robina Esquivel NP on 2/15/23.     She is doing okay today. Working well with therapy and walked 86 ft and 96ft using rolling walker and CGA today. Says that her appetite is okay and is having regular BMs. She denies any dysuria, fever, or chills.     Patient admitted with skilled services with PT and OT to improve functional status and ability to perform ADLs.       Past Medical History:   Past Medical History:   Diagnosis Date    Addiction to drug     Alcohol abuse     Alcohol abuse, in remission 6/15/2015    14.5 weeks ago; AA weekly    Anemia     Anxiety 6/15/2015    Behavioral problem     Bipolar disorder     Bipolar disorder in remission 6/15/2015    Cirrhosis, Laennec's 6/15/2015    Depression     Encounter for blood transfusion     Epistaxis 6/15/2015    Fatigue     Glaucoma     Hematuria     Hepatic encephalopathy 6/15/2015    Hepatic enlargement     History of psychiatric care     History of psychiatric hospitalization     History of seizure 6/15/2015    1    hx of intentional Tylenol overdose 6/15/2015    2005- situational and hx of bipolar    Hx of psychiatric care     Macrocytic anemia 9/18/2015    6 units PRBC since June 2015    Jeana     Osteoarthritis     Other ascites 6/15/2015    Psychiatric exam requested by authority     Psychiatric problem     Psychosis 9/26/2019    Renal disorder     Seizures     Self-harming behavior     Suicide attempt     Therapy     Thrombocytopenia 6/15/2015       Past Surgical History:   Past Surgical History:   Procedure Laterality Date    COSMETIC SURGERY      " ESOPHAGOGASTRODUODENOSCOPY         Social History:   Tobacco Use    Smoking status: Never    Smokeless tobacco: Never   Substance and Sexual Activity    Alcohol use: Not Currently     Comment: hx of ETOH abuse with cirrhosis of liver    Drug use: No    Sexual activity: Not Currently       Family History:   Family History       Problem Relation (Age of Onset)    Alcohol abuse Father, Sister, Paternal Grandfather    Bipolar disorder Father    Hypertension Mother    Suicide Father            No current facility-administered medications on file prior to encounter.     Current Outpatient Medications on File Prior to Encounter   Medication Sig    ARIPiprazole (ABILIFY) 10 MG Tab Take 10 mg by mouth nightly.    folic acid (FOLVITE) 1 MG tablet Take 1 tablet (1 mg total) by mouth once daily.    furosemide (LASIX) 20 MG tablet Take 0.5 tablets (10 mg total) by mouth 2 (two) times daily.    hydrOXYzine (ATARAX) 50 MG tablet Take 50 mg by mouth every evening.    lactulose (CHRONULAC) 20 gram/30 mL Soln Take 45 mLs (30 g total) by mouth 3 (three) times daily.    levETIRAcetam (KEPPRA) 500 MG Tab Take 1,000 mg by mouth 2 (two) times daily.    lithium (LITHOTAB) 300 mg tablet Take 300 mg by mouth nightly.    multivitamin Tab Take 1 tablet by mouth once daily.    pantoprazole (PROTONIX) 40 MG tablet Take 40 mg by mouth once daily.    propranoloL (INDERAL) 20 MG tablet Take 20 mg by mouth once daily.    QUEtiapine (SEROQUEL) 100 MG Tab Take 100 mg by mouth every evening.    sodium bicarbonate 650 MG tablet Take 2 tablets (1,300 mg total) by mouth 2 (two) times daily.    spironolactone (ALDACTONE) 25 MG tablet Take 25 mg by mouth once daily.    thiamine 100 MG tablet Take 1 tablet (100 mg total) by mouth once daily.       Allergies:   Review of patient's allergies indicates:   Allergen Reactions    Sulfa (sulfonamide antibiotics) Rash    Codeine Nausea And Vomiting       ROS:  Review of Systems   Constitutional:  Negative for  appetite change and fever.   Respiratory:  Negative for cough and shortness of breath.    Cardiovascular:  Negative for chest pain and palpitations.   Gastrointestinal:  Negative for abdominal pain, nausea and vomiting.   Genitourinary:  Negative for difficulty urinating and dysuria.   Musculoskeletal:  Negative for arthralgias and back pain.   Neurological:  Positive for weakness. Negative for dizziness and light-headedness.   Psychiatric/Behavioral:  Negative for sleep disturbance. The patient is not nervous/anxious.        Objective:  Temp:  [98.3 °F (36.8 °C)-98.6 °F (37 °C)]   Pulse:  [74-97]   Resp:  [18]   BP: ()/(50-54)   SpO2:  [95 %-98 %]     Body mass index is 18.82 kg/m².      Physical Exam  Vitals and nursing note reviewed.   Constitutional:       General: She is not in acute distress.     Appearance: She is well-developed.   Cardiovascular:      Rate and Rhythm: Normal rate and regular rhythm.      Heart sounds: S1 normal and S2 normal. Murmur heard.   Systolic murmur is present.   Pulmonary:      Effort: Pulmonary effort is normal. No respiratory distress.      Breath sounds: Normal breath sounds. No wheezing or rales.   Abdominal:      General: Bowel sounds are normal. There is no distension.      Palpations: Abdomen is soft.      Tenderness: There is no abdominal tenderness.   Musculoskeletal:         General: No tenderness.      Right lower leg: No edema.      Left lower leg: No edema.   Skin:     General: Skin is warm and dry.      Findings: Bruising present.   Neurological:      Mental Status: She is alert and oriented to person, place, and time.   Psychiatric:         Mood and Affect: Mood normal.         Behavior: Behavior normal. Behavior is cooperative.         Thought Content: Thought content normal.       Significant Labs:   TSH:   Recent Labs   Lab 02/07/23  1356   TSH 0.361*     Urine Studies:   Recent Labs   Lab 02/16/23  1624   COLORU Yellow   APPEARANCEUA Clear   PHUR 6.0    SPECGRAV 1.010   PROTEINUA Negative   GLUCUA Negative   KETONESU Negative   BILIRUBINUA Negative   OCCULTUA Negative   NITRITE Negative   LEUKOCYTESUR 2+*   RBCUA 3   WBCUA 29*   BACTERIA Many*   SQUAMEPITHEL 2     CBC:  Recent Labs   Lab 02/16/23  0513   WBC 1.37*   HGB 8.1*   HCT 25.9*   PLT 40*   *     BMP:  Recent Labs   Lab 02/16/23 0513   GLU 65*      K 3.5      CO2 18*   BUN 5*   CREATININE 0.5   CALCIUM 8.3*   MG 1.6   PHOS 2.8     LFTs:  Recent Labs   Lab 02/16/23 0513   ALT 18   AST 43*   ALKPHOS 94   BILITOT 2.7*   PROT 4.4*   ALBUMIN 2.2*         Significant Imaging: I have reviewed all pertinent imaging results/findings completed during prior hospitalization.      Assessment and Plan:  Active Diagnoses:    Diagnosis Date Noted POA    PRINCIPAL PROBLEM:  Hepatic encephalopathy [K76.82] 10/11/2015 Yes    Metabolic acidosis [E87.20] 02/10/2023 Yes    UTI (urinary tract infection) [N39.0] 11/03/2021 Yes    Bipolar disorder, manic [F31.10] 01/21/2021 Yes    Bipolar 1 disorder [F31.9] 06/09/2020 Yes    Elevated LFTs [R79.89] 06/03/2020 Yes    Weakness [R53.1] 10/11/2015 Yes    Malnutrition [E46] 10/11/2015 Yes    Chronic liver failure [K72.11] 10/11/2015 Yes    Anemia [D64.9] 09/18/2015 Yes    History of seizure [Z87.898] 06/15/2015 Yes    Thrombocytopenia [D69.6] 06/15/2015 Yes    Cirrhosis, Laennec's [K70.30] 06/15/2015 Yes      Problems Resolved During this Admission:        Cirrhosis, Laennec's  Hepatic encephalopathy (resolved)  - continue lactulose 30g TID with goal of 3-4 bowel movements daily  - Holding home Lasix given concern for dehydration  - Continue spironolactone 25 mg daily  - continue propanolol  - Low-sodium diet, fluid restriction 1500 mL  - Nutrition consulted  - Mentation back to baseline  - PT/OT  - 2/16 ordered ammonia level, 61 from 68     Metabolic acidosis  - continue sodium bicarbonate 1300 mg BID     UTI (urinary tract infection) due to E.coli and  Enterococcus  - Received course of ceftriaxone in the hospital.   - Completed course of amoxicillin on 2/16/23 while at SNF.   - Repeat UA negative yesterday.      Bipolar 1 disorder, depressed, severe  - Continue home lithium 3 mg qHS., Abilify 10 mg qHS, Seroquel 100 mg qHS.     Malnutrition  - RD consulted  - continue multivitamin     Pancytopenia  Leukopenia/Neutropenia  Macrocytic Anemia  - Etiology likely due to chronic disease  - continue folic acid, thiamine  - continue monitor twice weekly CBCs  - transfuse for hemoglobin < 7     History of seizure  - Continue home Keppra 1000 mg BID      Thrombocytopenia  - Secondary to cirrhosis   - platelet counts low but stable  - continue monitor twice weekly CBC     GERD  - continue pantoprazole 40 mg daily     Debility   - Continue with PT/OT for gait training and strengthening and restoration of ADL's   - Encourage mobility, OOB in chair, and early ambulation as appropriate  - Fall precautions   - Monitor for bowel and bladder dysfunction  - Monitor for and prevent skin breakdown and pressure ulcers  - Continue DVT prophylaxis with frequent ambulation, chemical DVT prophylaxis not indicated in this patient with coagulopathies       Anticipated Disposition:  Home with home health      No future appointments.    I certify that SNF services are required to be given on an inpatient basis because Marely Hamilton needs for skilled nursing care and/or skilled rehabilitation are required on a daily basis and such services can only practically be provided in a skilled nursing facility setting and are for an ongoing condition for which she received inpatient care in the hospital.       Tim Quan MD  Department of Hospital Medicine   [facility] - Skilled Nursing

## 2023-02-17 NOTE — PT/OT/SLP PROGRESS
"Occupational Therapy   Treatment    Name: Marely Hamilton  MRN: [id number]  Admit Date: 2/14/2023  Admitting Diagnosis:  Hepatic encephalopathy    General Precautions: Standard, fall   Orthopedic Precautions: N/A   Braces: N/A    Recommendations:     Discharge Recommendations:  home health OT  Level of Assistance Recommended at Discharge: Intermittent assistance for ADL's and homemaking tasks  Discharge Equipment Recommendations:  (TBD)  Barriers to discharge:  Decreased caregiver support    Assessment:     Marely Hamilton is a 58 y.o. female with a medical diagnosis of Hepatic encephalopathy .  She presents with performance deficits affecting function are weakness, impaired endurance, impaired self care skills, impaired functional mobility, gait instability, impaired balance, impaired cognition, decreased upper extremity function, decreased lower extremity function, decreased safety awareness, impaired cardiopulmonary response to activity.     Pt participated well during today's session. Pt tolerated tx session without incident and is making progress, however, continues to demonstrate deficits with self care skills, balance, functional mobility, UB strength and endurance. Pt will benefit from continued OT services to progress towards goals.     Rehab Potential is good    Activity tolerance:  Good    Plan:     Patient to be seen 6 x/week to address the above listed problems via self-care/home management, therapeutic activities, therapeutic exercises    Plan of Care Expires: 03/17/23  Plan of Care Reviewed with: patient    Subjective   "I'm doing good today"  Communicated with: Rolando prior to session.  .    Pain/Comfort:  Pain Rating 1: 0/10  Pain Rating Post-Intervention 1: 0/10    Patient's cultural, spiritual, Sikh conflicts given the current situation:  no    Objective:     Patient found left sidelying upon OT entry to room.    Bed Mobility:    Patient completed Scooting/Bridging with stand by " assistance  Patient completed Supine to Sit with stand by assistance     Functional Mobility/Transfers:  Patient completed Sit <> Stand Transfer with contact guard assistance  with  rolling walker   Patient completed Bed <> Chair Transfer using Stand Pivot technique with contact guard assistance with rolling walker    Activities of Daily Living:  Not performed, already completed.     [facility] 6 Click ADL: 19    OT Exercises: AROM x 2 sets of 15 with #2 dowel to perform shoulder flex/ext, forward flex and bicep curls.   Therex performed to improve overall functional mobility/transfers, ADL's and w/c propulsion.     Treatment & Education:  Pt with standing activity on today with CGA and RW. Pt at raised counter perform matching card activity with focus on standing tolerance, dynamic standing bal, functional reaching crossing midline, and to promote independence with homemaking and self care tasks. Pt able to maintain standing tolerance for ~ 2:25 min/sec, Pt instructed to sit in w/c due to unsteadiness. Pt required v/c's to accurately match cards.     Pt educated on role of OT, safety while performing functional transfers and self care tasks, o and progress towards OT goals.    Patient left up in chair with call button in reach    GOALS:   Multidisciplinary Problems       Occupational Therapy Goals          Problem: Occupational Therapy    Goal Priority Disciplines Outcome Interventions   Occupational Therapy Goal     OT, PT/OT Ongoing, Progressing    Description: Goals to be met by: 3/17/2023     Patient will increase functional independence with ADLs by performing:    UE Dressing with [place].  LE Dressing with [place].  Grooming while standing at sink with Modified [place].  Toileting from toilet with Modified [place] for hygiene and clothing management.   Bathing from  shower chair/bench with Stand-by Assistance.  Supine to sit with [place].  Step transfer with Modified [place] with  RW.  Toilet transfer to toilet with Modified [place] with RW.                         Time Tracking:     OT Date of Treatment: 02/17/23  OT Start Time: 1106    OT Stop Time: 1139  OT Total Time (min): 33 min    Billable Minutes:Therapeutic Activity 18  Therapeutic Exercise 15    2/17/2023  A client care conference was performed between the LOTR and SHEPARD, prior to treatment by SHEPARD, to discuss the patient's status, treatment plan and established goals.

## 2023-02-17 NOTE — PT/OT/SLP PROGRESS
"Physical Therapy Treatment    Patient Name:  Marely Hamilton   MRN:  [id number]  Admit Date: 2/14/2023  Admitting Diagnosis: Hepatic encephalopathy  Recent Surgeries:     General Precautions: Standard, fall  Orthopedic Precautions: N/A  Braces: N/A    Recommendations:     Discharge Recommendations: home with home health  Level of Assistance Recommended at Discharge: Intermittent assistance   Discharge Equipment Recommendations: shower chair  Barriers to discharge: Decreased caregiver support    Assessment:     Marely Hamilton is a 58 y.o. female admitted with a medical diagnosis of Hepatic encephalopathy . Pt tolerated well, pt is very pleasant, demo good effort, motivated to improve, pt would continue to benefit from skilled PT services to improve overall functional mobility, strength and endurance.  .      Performance deficits affecting function: weakness, impaired endurance, impaired self care skills, gait instability, impaired functional mobility, decreased upper extremity function, decreased lower extremity function, impaired balance, impaired cognition, impaired cardiopulmonary response to activity.    Rehab Potential is good    Activity Tolerance: Fair    Plan:     Patient to be seen 6 x/week to address the above listed problems via gait training, therapeutic activities, therapeutic exercises, neuromuscular re-education, wheelchair management/training    Plan of Care Expires: 03/17/23  Plan of Care Reviewed with: patient    Subjective     "I'm good" .     Pain/Comfort:  Pain Rating 1: 0/10  Pain Rating Post-Intervention 1: 0/10    Patient's cultural, spiritual, Baptism conflicts given the current situation:  no    Objective:       Patient found with  (in wc) upon PT entry to room.     Therapeutic Activities and Exercises: mini elliptical x 15 minutes  BUE bicep curls and punch out 2x10 reps RUE 1.5 #wt LUE 1# wt    Functional Mobility:  Transfers:     Sit to Stand:  stand by assistance with rolling " walker  Gait: amb with RW CGA ~ 86 ft an 96 ft seated rest break wc follow little unsteady gait but no LOB pt walks on the front of her foot no heel strike noted recommended  wearing  shoes would be beneficial  for balance  Balance: dyn standing bal act with RW CGA uni lat support tapping balloon with BUE ~ 5 minutes    AM-PAC 6 CLICK MOBILITY  17    Patient left up in chair with call button in reach and belonging in reach .    GOALS:   Multidisciplinary Problems       Physical Therapy Goals          Problem: Physical Therapy    Goal Priority Disciplines Outcome Goal Variances Interventions   Physical Therapy Goal     PT, PT/OT Ongoing, Progressing     Description: Goals to be met by: 3/17/23     Patient will increase functional independence with mobility by performing:    . Supine to sit with [place]  . Sit to supine with [place]  . Rolling to Left and Right with [place].  . Sit to stand transfer with Supervision  . Bed to chair transfer with Supervision using No Assistive Device  . Gait  x 150 feet with Supervision using Rolling Walker.   . Wheelchair propulsion x100 feet with Contact Guard Assistance using bilateral uppper extremities  . Ascend/descend 4 stair with bilateral Handrails Contact Guard Assistance using No Assistive Device.   . Ascend/Descend 4 inch curb step with Contact Guard Assistance using Rolling Walker.                         Time Tracking:     PT Received On: 02/17/23  PT Start Time: 1009  PT Stop Time: 1050  PT Total Time (min): 41 min    Billable Minutes: Gait Training 13, Therapeutic Activity 8, and Therapeutic Exercise 20    Treatment Type: Treatment  PT/PTA: PTA     PTA Visit Number: 1 02/17/2023

## 2023-02-18 PROCEDURE — 97535 SELF CARE MNGMENT TRAINING: CPT | Mod: CO

## 2023-02-18 PROCEDURE — 11000004 HC SNF PRIVATE

## 2023-02-18 PROCEDURE — 25000003 PHARM REV CODE 250: Performed by: HOSPITALIST

## 2023-02-18 RX ADMIN — LITHIUM CARBONATE 300 MG: 300 TABLET, FILM COATED, EXTENDED RELEASE ORAL at 08:02

## 2023-02-18 RX ADMIN — LEVETIRACETAM 1000 MG: 500 TABLET, FILM COATED ORAL at 09:02

## 2023-02-18 RX ADMIN — LACTULOSE 30 G: 20 SOLUTION ORAL at 08:02

## 2023-02-18 RX ADMIN — SODIUM BICARBONATE 1300 MG: 650 TABLET ORAL at 08:02

## 2023-02-18 RX ADMIN — THERA TABS 1 TABLET: TAB at 09:02

## 2023-02-18 RX ADMIN — SPIRONOLACTONE 25 MG: 25 TABLET, FILM COATED ORAL at 09:02

## 2023-02-18 RX ADMIN — LEVETIRACETAM 1000 MG: 500 TABLET, FILM COATED ORAL at 08:02

## 2023-02-18 RX ADMIN — SODIUM BICARBONATE 1300 MG: 650 TABLET ORAL at 09:02

## 2023-02-18 RX ADMIN — FOLIC ACID 1 MG: 1 TABLET ORAL at 09:02

## 2023-02-18 RX ADMIN — PANTOPRAZOLE SODIUM 40 MG: 40 TABLET, DELAYED RELEASE ORAL at 09:02

## 2023-02-18 RX ADMIN — THIAMINE HCL TAB 100 MG 100 MG: 100 TAB at 09:02

## 2023-02-18 RX ADMIN — QUETIAPINE FUMARATE 100 MG: 25 TABLET ORAL at 05:02

## 2023-02-18 RX ADMIN — HYDROXYZINE HYDROCHLORIDE 50 MG: 25 TABLET, FILM COATED ORAL at 08:02

## 2023-02-18 RX ADMIN — LACTULOSE 30 G: 20 SOLUTION ORAL at 09:02

## 2023-02-18 NOTE — PLAN OF CARE
Problem: Adult Inpatient Plan of Care  Goal: Plan of Care Review  Outcome: Ongoing, Progressing  Goal: Patient-Specific Goal (Individualized)  Outcome: Ongoing, Progressing  Goal: Absence of Hospital-Acquired Illness or Injury  Outcome: Ongoing, Progressing  Goal: Optimal Comfort and Wellbeing  Outcome: Ongoing, Progressing  Goal: Readiness for Transition of Care  Outcome: Ongoing, Progressing     Problem: Skin Injury Risk Increased  Goal: Skin Health and Integrity  Outcome: Ongoing, Progressing     Problem: Impaired Wound Healing  Goal: Optimal Wound Healing  Outcome: Ongoing, Progressing     Problem: Malnutrition  Goal: Improved Nutritional Intake  Outcome: Ongoing, Progressing

## 2023-02-18 NOTE — PT/OT/SLP PROGRESS
"Occupational Therapy   Treatment    Name: Marely Hamilton  MRN: [id number]  Admit Date: 2/14/2023  Admitting Diagnosis:  Hepatic encephalopathy    General Precautions: Standard, fall   Orthopedic Precautions: N/A   Braces: N/A    Recommendations:     Discharge Recommendations:  home health OT  Level of Assistance Recommended at Discharge: Intermittent assistance for ADL's and homemaking tasks  Discharge Equipment Recommendations:  (TBD)  Barriers to discharge:  Decreased caregiver support    Assessment:     Marely Hamilton is a 58 y.o. female with a medical diagnosis of Hepatic encephalopathy .  She presents with performance deficits affecting function are weakness, impaired endurance, impaired self care skills, impaired functional mobility, gait instability, impaired balance, impaired cognition, decreased upper extremity function, decreased lower extremity function, decreased safety awareness, impaired cardiopulmonary response to activity.   Pt participated fair during today's session. Pt stated that she was exhausted but would try. Pt is making progress, however, continues to demonstrate deficits with self care skills, balance, functional mobility, UB strength and endurance. Pt will benefit from continued OT services to progress towards goals.     Rehab Potential is good    Activity tolerance:  Fair    Plan:     Patient to be seen 6 x/week to address the above listed problems via self-care/home management, therapeutic activities, therapeutic exercises    Plan of Care Expires: 03/17/23  Plan of Care Reviewed with: patient    Subjective   "I'm so tired today"  Communicated with: [facility] prior to session.  .    Pain/Comfort:  Pain Rating 1: 0/10  Pain Rating Post-Intervention 1: 0/10    Patient's cultural, spiritual, Caodaism conflicts given the current situation:  no    Objective:     Patient found right sidelying upon OT entry to room.    Bed Mobility:    Patient completed Scooting/Bridging with supervision  Patient " completed Supine to Sit with supervision     Functional Mobility/Transfers:  Patient completed Sit <> Stand Transfer with contact guard assistance  with  rolling walker   Patient completed Toilet Transfer Step Transfer technique with contact guard assistance with  rolling walker  Functional Mobility: Pt ambulated approx 15 ft in room with CGA and RW. Pt required v/c's for safety.     Activities of Daily Living:  Toileting: contact guard assistance to perform brijesh hygiene in stance with use of grab bar and RW.     [facility] 6 Click ADL: 19    Treatment & Education:  Pt educated on role of OT, safety while performing functional transfers and self care tasks,s and progress towards OT goals.    Patient left supine with call button in reach    GOALS:   Multidisciplinary Problems       Occupational Therapy Goals          Problem: Occupational Therapy    Goal Priority Disciplines Outcome Interventions   Occupational Therapy Goal     OT, PT/OT Ongoing, Progressing    Description: Goals to be met by: 3/17/2023     Patient will increase functional independence with ADLs by performing:    UE Dressing with [place].  LE Dressing with [place].  Grooming while standing at sink with Modified [place].  Toileting from toilet with Modified [place] for hygiene and clothing management.   Bathing from  shower chair/bench with Stand-by Assistance.  Supine to sit with [place].  Step transfer with Modified [place] with RW.  Toilet transfer to toilet with Modified [place] with RW.                         Time Tracking:     OT Date of Treatment: 02/18/23  OT Start Time: 1411    OT Stop Time: 1426  OT Total Time (min): 15 min    Billable Minutes:Self Care/Home Management 15    2/18/2023

## 2023-02-19 LAB — BACTERIA UR CULT: ABNORMAL

## 2023-02-19 PROCEDURE — 11000004 HC SNF PRIVATE

## 2023-02-19 PROCEDURE — 97530 THERAPEUTIC ACTIVITIES: CPT

## 2023-02-19 PROCEDURE — 97110 THERAPEUTIC EXERCISES: CPT

## 2023-02-19 PROCEDURE — 97116 GAIT TRAINING THERAPY: CPT

## 2023-02-19 PROCEDURE — 25000003 PHARM REV CODE 250: Performed by: HOSPITALIST

## 2023-02-19 RX ADMIN — LEVETIRACETAM 1000 MG: 500 TABLET, FILM COATED ORAL at 08:02

## 2023-02-19 RX ADMIN — FOLIC ACID 1 MG: 1 TABLET ORAL at 08:02

## 2023-02-19 RX ADMIN — THERA TABS 1 TABLET: TAB at 08:02

## 2023-02-19 RX ADMIN — SODIUM BICARBONATE 1300 MG: 650 TABLET ORAL at 08:02

## 2023-02-19 RX ADMIN — Medication 6 MG: at 07:02

## 2023-02-19 RX ADMIN — ARIPIPRAZOLE 10 MG: 5 TABLET ORAL at 08:02

## 2023-02-19 RX ADMIN — QUETIAPINE FUMARATE 100 MG: 25 TABLET ORAL at 05:02

## 2023-02-19 RX ADMIN — LACTULOSE 30 G: 20 SOLUTION ORAL at 08:02

## 2023-02-19 RX ADMIN — LACTULOSE 30 G: 20 SOLUTION ORAL at 03:02

## 2023-02-19 RX ADMIN — PANTOPRAZOLE SODIUM 40 MG: 40 TABLET, DELAYED RELEASE ORAL at 08:02

## 2023-02-19 RX ADMIN — LITHIUM CARBONATE 300 MG: 300 TABLET, FILM COATED, EXTENDED RELEASE ORAL at 08:02

## 2023-02-19 RX ADMIN — THIAMINE HCL TAB 100 MG 100 MG: 100 TAB at 08:02

## 2023-02-19 RX ADMIN — SPIRONOLACTONE 25 MG: 25 TABLET, FILM COATED ORAL at 08:02

## 2023-02-19 NOTE — PLAN OF CARE
Problem: Adult Inpatient Plan of Care  Goal: Plan of Care Review  Outcome: Ongoing, Progressing  Goal: Patient-Specific Goal (Individualized)  Outcome: Ongoing, Progressing  Goal: Absence of Hospital-Acquired Illness or Injury  Outcome: Ongoing, Progressing  Goal: Optimal Comfort and Wellbeing  Outcome: Ongoing, Progressing     Problem: Skin Injury Risk Increased  Goal: Skin Health and Integrity  Outcome: Ongoing, Progressing     Problem: Impaired Wound Healing  Goal: Optimal Wound Healing  Outcome: Ongoing, Progressing     Problem: Malnutrition  Goal: Improved Nutritional Intake  Outcome: Ongoing, Progressing

## 2023-02-19 NOTE — PT/OT/SLP PROGRESS
Physical Therapy Treatment    Patient Name:  Marely Hamilton   MRN:  [id number]  Admit Date: 2/14/2023  Admitting Diagnosis: Hepatic encephalopathy    General Precautions: Standard, fall  Orthopedic Precautions: N/A  Braces: N/A    Recommendations:     Discharge Recommendations: home with home health  Level of Assistance Recommended at Discharge: Intermittent assistance   Discharge Equipment Recommendations: shower chair  Barriers to discharge: Decreased caregiver support    Assessment:     Marely Hamilton is a 58 y.o. female admitted with a medical diagnosis of Hepatic encephalopathy . Pt continues to make good functional progress as evident in improvements with her GG scores since her initial eval. Pt will benefit from continued SNF rehab to help improve her overall functional mobility and safety prior to d/c home.      Performance deficits affecting function: weakness, impaired endurance, impaired functional mobility, impaired self care skills, gait instability, impaired balance, decreased upper extremity function, decreased lower extremity function, decreased safety awareness, impaired cardiopulmonary response to activity (Avas[employer] camera in place).    Rehab Potential is good    Activity Tolerance: Good    Plan:     Patient to be seen 6 x/week to address the above listed problems via gait training, therapeutic activities, therapeutic exercises, neuromuscular re-education, wheelchair management/training    Plan of Care Expires: 03/17/23  Plan of Care Reviewed with: patient    Subjective     Pt. Agreeable to work with PT.     Pain/Comfort:  Pain Rating 1: 0/10  Pain Rating Post-Intervention 1: 0/10    Patient's cultural, spiritual, Nondenominational conflicts given the current situation:  no    Objective:     Communicated with pt's nurse prior to session.  Patient found HOB elevated with   upon PT entry to room.     Therapeutic Activities and Exercises: Seated mini-eliptical with resistance set at medium to help improve B L/E  MMT and endurance.    Functional Mobility:     02/19/23 0949   Roll Left and Right   Assistance Needed Supervision   CARE Score - Roll Left and Right 4   Sit to Lying   Assistance Needed Supervision   CARE Score - Sit to Lying 4   Lying to Sitting on Side of Bed   Assistance Needed Supervision   CARE Score - Lying to Sitting on Side of Bed 4   Sit to Stand   Assistance Needed Incidental touching   Comment with no AD and with RW   CARE Score - Sit to Stand 4   Chair/Bed-to-Chair Transfer   Assistance Needed Incidental touching   Comment SPT, no AD   CARE Score - Chair/Bed-to-Chair Transfer 4   Chair/Bed-to-Chair Transfer Discharge Goal   Discharge Goal 5   Car Transfer   Reason if not Attempted Environmental limitations   CARE Score - Car Transfer 10   Walk 10 Feet   Assistance Needed Incidental touching   Comment 120ft +124ft with RW and CGA for safety as pt with decrease step length, decrease alex and decrease B push off. pt needed rest breaks d.t fatigue.   CARE Score - Walk 10 Feet 4   Walk 50 Feet with Two Turns   Assistance Needed Incidental touching   Comment 120ft +124ft with RW and CGA for safety as pt with decrease step length, decrease alex and decrease B push off. pt needed rest breaks d.t fatigue.   CARE Score - Walk 50 Feet with Two Turns 4   Walk 150 Feet   Reason if not Attempted Safety concerns   CARE Score - Walk 150 Feet 88   Walking 10 Feet on Uneven Surfaces   Physical Assistance Level 25% or less   Comment with RW   CARE Score - Walking 10 Feet on Uneven Surfaces 3   1 Step (Curb)   Physical Assistance Level 25% or less   Comment with RW, 4inch curb step   CARE Score - 1 Step (Curb) 3   4 Steps   Physical Assistance Level 25% or less   Comment using B rails   CARE Score - 4 Steps 3   12 Steps   Reason if not Attempted Safety concerns   CARE Score - 12 Steps 88   Picking Up Object   Physical Assistance Level 25% or less   Comment with RW and reacher   CARE Score - Picking Up Object 3   Uses  a Wheelchair/Scooter?   Uses a Wheelchair/Scooter? Yes   Wheel 50 Feet with Two Turns   Physical Assistance Level 26%-50%   Comment pt became fatigued at 80ft   CARE Score - Wheel 50 Feet with Two Turns 3   Type of Wheelchair/Scooter Manual   Wheel 150 Feet   Reason if not Attempted Safety concerns   CARE Score - Wheel 150 Feet 88   Type of Wheelchair/Scooter Manual     Education:  Patient provided with daily orientation and goals of this PT session.  Patient educated to transfer to bedside chair/bedside commode/bathroom with RN/PCT present.   Patient educated on Fall risk and plan of care by explanation.   Patient Verbalized Understanding and Needs Reinforcement.  Time provided for therapeutic counseling and discussion of current health disposition. All questions answered to satisfaction, within scope of PT practice; voiced no other concerns. White board updated in patient's room, RN notified of session.     AM-PAC 6 CLICK MOBILITY  18    Patient left up in chair with call button in reach.    GOALS:   Multidisciplinary Problems       Physical Therapy Goals          Problem: Physical Therapy    Goal Priority Disciplines Outcome Goal Variances Interventions   Physical Therapy Goal     PT, PT/OT Ongoing, Progressing     Description: Goals to be met by: 3/17/23     Patient will increase functional independence with mobility by performing:    . Supine to sit with [place]  . Sit to supine with [place]  . Rolling to Left and Right with [place].  . Sit to stand transfer with Supervision  . Bed to chair transfer with Supervision using No Assistive Device  . Gait  x 150 feet with Supervision using Rolling Walker.   . Wheelchair propulsion x100 feet with Contact Guard Assistance using bilateral uppper extremities  . Ascend/descend 4 stair with bilateral Handrails Contact Guard Assistance using No Assistive Device.   . Ascend/Descend 4 inch curb step with Contact Guard Assistance using Rolling Walker.                          Time Tracking:     PT Received On: 02/19/23  PT Start Time: 0910  PT Stop Time: 0958  PT Total Time (min): 48 min    Billable Minutes: Gait Training 18, Therapeutic Activity 15, and Therapeutic Exercise 15    Treatment Type: Treatment  PT/PTA: PT     PTA Visit Number: 0     02/19/2023

## 2023-02-20 LAB
ALBUMIN SERPL BCP-MCNC: 2.1 G/DL (ref 3.5–5.2)
ALP SERPL-CCNC: 121 U/L (ref 55–135)
ALT SERPL W/O P-5'-P-CCNC: 17 U/L (ref 10–44)
ANION GAP SERPL CALC-SCNC: 7 MMOL/L (ref 8–16)
ANISOCYTOSIS BLD QL SMEAR: SLIGHT
AST SERPL-CCNC: 35 U/L (ref 10–40)
BASOPHILS # BLD AUTO: 0.01 K/UL (ref 0–0.2)
BASOPHILS NFR BLD: 0.7 % (ref 0–1.9)
BILIRUB SERPL-MCNC: 1.8 MG/DL (ref 0.1–1)
BUN SERPL-MCNC: 8 MG/DL (ref 6–20)
BURR CELLS BLD QL SMEAR: ABNORMAL
CALCIUM SERPL-MCNC: 8.4 MG/DL (ref 8.7–10.5)
CHLORIDE SERPL-SCNC: 107 MMOL/L (ref 95–110)
CO2 SERPL-SCNC: 23 MMOL/L (ref 23–29)
CREAT SERPL-MCNC: 0.6 MG/DL (ref 0.5–1.4)
DIFFERENTIAL METHOD: ABNORMAL
EOSINOPHIL # BLD AUTO: 0.1 K/UL (ref 0–0.5)
EOSINOPHIL NFR BLD: 3.3 % (ref 0–8)
ERYTHROCYTE [DISTWIDTH] IN BLOOD BY AUTOMATED COUNT: 16.3 % (ref 11.5–14.5)
EST. GFR  (NO RACE VARIABLE): >60 ML/MIN/1.73 M^2
GLUCOSE SERPL-MCNC: 100 MG/DL (ref 70–110)
HCT VFR BLD AUTO: 25.4 % (ref 37–48.5)
HGB BLD-MCNC: 8.2 G/DL (ref 12–16)
IMM GRANULOCYTES # BLD AUTO: 0 K/UL (ref 0–0.04)
IMM GRANULOCYTES NFR BLD AUTO: 0 % (ref 0–0.5)
LYMPHOCYTES # BLD AUTO: 0.6 K/UL (ref 1–4.8)
LYMPHOCYTES NFR BLD: 41.4 % (ref 18–48)
MAGNESIUM SERPL-MCNC: 1.7 MG/DL (ref 1.6–2.6)
MCH RBC QN AUTO: 34 PG (ref 27–31)
MCHC RBC AUTO-ENTMCNC: 32.3 G/DL (ref 32–36)
MCV RBC AUTO: 105 FL (ref 82–98)
MONOCYTES # BLD AUTO: 0.2 K/UL (ref 0.3–1)
MONOCYTES NFR BLD: 10.5 % (ref 4–15)
NEUTROPHILS # BLD AUTO: 0.7 K/UL (ref 1.8–7.7)
NEUTROPHILS NFR BLD: 44.1 % (ref 38–73)
NRBC BLD-RTO: 0 /100 WBC
OVALOCYTES BLD QL SMEAR: ABNORMAL
PHOSPHATE SERPL-MCNC: 3 MG/DL (ref 2.7–4.5)
PLATELET # BLD AUTO: 41 K/UL (ref 150–450)
PMV BLD AUTO: 8.9 FL (ref 9.2–12.9)
POIKILOCYTOSIS BLD QL SMEAR: SLIGHT
POLYCHROMASIA BLD QL SMEAR: ABNORMAL
POTASSIUM SERPL-SCNC: 3.7 MMOL/L (ref 3.5–5.1)
PROT SERPL-MCNC: 4.3 G/DL (ref 6–8.4)
RBC # BLD AUTO: 2.41 M/UL (ref 4–5.4)
SODIUM SERPL-SCNC: 137 MMOL/L (ref 136–145)
WBC # BLD AUTO: 1.52 K/UL (ref 3.9–12.7)

## 2023-02-20 PROCEDURE — 25000003 PHARM REV CODE 250: Performed by: NURSE PRACTITIONER

## 2023-02-20 PROCEDURE — 97110 THERAPEUTIC EXERCISES: CPT

## 2023-02-20 PROCEDURE — 25000003 PHARM REV CODE 250: Performed by: HOSPITALIST

## 2023-02-20 PROCEDURE — 11000004 HC SNF PRIVATE

## 2023-02-20 PROCEDURE — 97535 SELF CARE MNGMENT TRAINING: CPT | Mod: CO

## 2023-02-20 PROCEDURE — 84100 ASSAY OF PHOSPHORUS: CPT | Performed by: HOSPITALIST

## 2023-02-20 PROCEDURE — 97116 GAIT TRAINING THERAPY: CPT

## 2023-02-20 PROCEDURE — 85025 COMPLETE CBC W/AUTO DIFF WBC: CPT | Performed by: HOSPITALIST

## 2023-02-20 PROCEDURE — 83735 ASSAY OF MAGNESIUM: CPT | Performed by: HOSPITALIST

## 2023-02-20 PROCEDURE — 36415 COLL VENOUS BLD VENIPUNCTURE: CPT | Performed by: HOSPITALIST

## 2023-02-20 PROCEDURE — 80053 COMPREHEN METABOLIC PANEL: CPT | Performed by: NURSE PRACTITIONER

## 2023-02-20 RX ORDER — NITROFURANTOIN 25; 75 MG/1; MG/1
100 CAPSULE ORAL EVERY 12 HOURS
Status: COMPLETED | OUTPATIENT
Start: 2023-02-20 | End: 2023-02-26

## 2023-02-20 RX ADMIN — THIAMINE HCL TAB 100 MG 100 MG: 100 TAB at 08:02

## 2023-02-20 RX ADMIN — PANTOPRAZOLE SODIUM 40 MG: 40 TABLET, DELAYED RELEASE ORAL at 08:02

## 2023-02-20 RX ADMIN — LEVETIRACETAM 1000 MG: 500 TABLET, FILM COATED ORAL at 09:02

## 2023-02-20 RX ADMIN — SODIUM BICARBONATE 1300 MG: 650 TABLET ORAL at 09:02

## 2023-02-20 RX ADMIN — ARIPIPRAZOLE 10 MG: 5 TABLET ORAL at 09:02

## 2023-02-20 RX ADMIN — LACTULOSE 30 G: 20 SOLUTION ORAL at 09:02

## 2023-02-20 RX ADMIN — Medication 6 MG: at 09:02

## 2023-02-20 RX ADMIN — SODIUM BICARBONATE 1300 MG: 650 TABLET ORAL at 08:02

## 2023-02-20 RX ADMIN — LITHIUM CARBONATE 300 MG: 300 TABLET, FILM COATED, EXTENDED RELEASE ORAL at 09:02

## 2023-02-20 RX ADMIN — THERA TABS 1 TABLET: TAB at 08:02

## 2023-02-20 RX ADMIN — NITROFURANTOIN MONOHYDRATE/MACROCRYSTALS 100 MG: 25; 75 CAPSULE ORAL at 02:02

## 2023-02-20 RX ADMIN — SPIRONOLACTONE 25 MG: 25 TABLET, FILM COATED ORAL at 08:02

## 2023-02-20 RX ADMIN — LEVETIRACETAM 1000 MG: 500 TABLET, FILM COATED ORAL at 08:02

## 2023-02-20 RX ADMIN — LACTULOSE 30 G: 20 SOLUTION ORAL at 08:02

## 2023-02-20 RX ADMIN — NITROFURANTOIN MONOHYDRATE/MACROCRYSTALS 100 MG: 25; 75 CAPSULE ORAL at 09:02

## 2023-02-20 RX ADMIN — FOLIC ACID 1 MG: 1 TABLET ORAL at 08:02

## 2023-02-20 RX ADMIN — HYDROXYZINE HYDROCHLORIDE 50 MG: 25 TABLET, FILM COATED ORAL at 09:02

## 2023-02-20 RX ADMIN — QUETIAPINE FUMARATE 100 MG: 25 TABLET ORAL at 06:02

## 2023-02-20 NOTE — PROGRESS NOTES
[facility] - Skilled Nursing  Adult Nutrition  Progress Note    SUMMARY   Recommendations  Continue low sodium diet, boost plus daily chocolate, RD following  Goals: PO to meet 75% of needs with ONS by next RD follow up  Nutrition Goal Status: progressing towards goal  Communication of RD Recs: other (comment) (POC)    Assessment and Plan   Moderate  Protein-Calorie Malnutrition  Malnutrition in the context of  Chronic Illness/Injury     Related to (etiology):  Inadequate oral intake in the presence of altered mental status      Signs and Symptoms (as evidenced by):  Energy Intake: <75% of estimated energy requirement for > one month  Body Fat Depletion: moderate depletion of orbitals and triceps mild, chest  Muscle Mass Depletion: moderate depletion of temples, clavicle region, interosseous muscle and lower extremities         Interventions(treatment strategy):  Mineral modified diet- low sodium  Commercial beverage( protein, calories) boost plus daily, chocolate  Collaboration with other providers     Nutrition Diagnosis Status:  Continues    Malnutrition Assessment 2/16     Skin (Micronutrient): dry, cracked, bruised  Nails (Micronutrient): clubbed  Lips/Mucous Membranes (Micronutrient): pallor  Teeth (Micronutrient): broken dentition, other (see comments) (painful chewing)  Tongue (Micronutrient): red  Neck/Chest (Micronutrient): muscle wasting, subcutaneous fat loss  Musculoskeletal/Lower Extremities: muscle wasting, subcutaneous fat loss           Orbital Region (Subcutaneous Fat Loss): moderate depletion  Upper Arm Region (Subcutaneous Fat Loss): moderate depletion (severe buccal)  Thoracic and Lumbar Region: mild depletion   [place] Region (Muscle Loss): moderate depletion  Clavicle Bone Region (Muscle Loss): moderate depletion  Clavicle and Acromion Bone Region (Muscle Loss): severe depletion  Dorsal Hand (Muscle Loss): severe depletion  Patellar Region (Muscle Loss): mild depletion  Anterior Thigh Region  "(Muscle Loss): mild depletion  Posterior Calf Region (Muscle Loss): well nourished   Edema (Fluid Accumulation): 0-->no edema present             Reason for Assessment    Reason For Assessment: RD follow-up  Diagnosis:  (hepatic encephalopathy)  Interdisciplinary Rounds: attended  General Information Comments: wants more hot food such as hot tea with meals as she is cold, room temp is set at 80 degrees, spoke with patient during IDT rounds. taking boost plus, PO >75%  Nutrition Discharge Planning: DC low sodium diet, oral supplement of choice  Nutrition Risk Screen: no indicators present    Nutrition/Diet History    Patient Reported Diet/Restrictions/Preferences: general  Typical Food/Fluid Intake: 2 meals per day  Food Preferences: pasta, sandwiches  Spiritual, Cultural Beliefs, Catholic Practices, Values that Affect Care: no  Supplemental Drinks or Food Habits: Boost Plus  Vitamin/Mineral/Herbal Supplements: Multlivitamin/mineral  Food Allergies: NKFA    Anthropometrics    Temp: 98.2 °F (36.8 °C)  Height Method: Stated  Height: 5' 3" (160 cm)  Height (inches): 63 in  Weight Method: Standard Scale  Weight: 49.4 kg (108 lb 14.5 oz)  Weight (lb): 108.91 lb  Ideal Body Weight (IBW), Female: 115 lb  % Ideal Body Weight, Female (lb): 91.06 %  BMI (Calculated): 19.3  BMI Grade: 18.5-24.9 - normal  Weight Loss: unintentional  Usual Body Weight (UBW), k.36 kg  Weight Change Amount:  (pt reports ten pound weight loss this past year.started boost plus)  % Usual Body Weight: 92.68  % Weight Change From Usual Weight: -7.52 %       Lab/Procedures/Meds    Pertinent Labs Reviewed: reviewed  Pertinent Labs Comments: Hg 8.2, Hct 25.4, Ca 8.4, Alblumin 2.1,  Pertinent Medications Reviewed: reviewed  Pertinent Medications Comments: fiolic acid, thiamine, Mvi, lithium, lactulose, spironolactone, pantoprazole    Estimated/Assessed Needs    Weight Used For Calorie Calculations: 47.5 kg (104 lb 11.5 oz)  Energy Calorie Requirements " (kcal): 1662  Energy Need Method: Kcal/kg (35 kcal/kg)  Protein Requirements: 57g-71g  Weight Used For Protein Calculations: 47.5 kg (104 lb 11.5 oz) (x 1.2-1.5 g/kg)  Fluid Requirements (mL): 1662 or per MD  Estimated Fluid Requirement Method: RDA Method  RDA Method (mL): 1662  CHO Requirement: -      Nutrition Prescription Ordered    Current Diet Order: Low sodium  Nutrition Order Comments: hot tea w meals  Oral Nutrition Supplement: boost plus chocolate daily    Evaluation of Received Nutrient/Fluid Intake    I/O: no data  Energy Calories Required: meeting needs  Protein Required: meeting needs  Fluid Required: meeting needs  Comments: LBM 2/19 patient appears to have gained 2 pounds from 016 to 108#  Tolerance: tolerating  % Intake of Estimated Energy Needs: 75 - 100 %  % Meal Intake: 50 - 75 %    Nutrition Risk    Level of Risk/Frequency of Follow-up: low (one time per week)     Monitor and Evaluation    Food and Nutrient Intake: food and beverage intake  Food and Nutrient Adminstration: diet order  Physical Activity and Function: nutrition-related ADLs and IADLs  Anthropometric Measurements: weight change  Biochemical Data, Medical Tests and Procedures: electrolyte and renal panel, inflammatory profile, gastrointestinal profile  Nutrition-Focused Physical Findings: overall appearance     Nutrition Follow-Up    RD Follow-up?: Yes

## 2023-02-20 NOTE — PT/OT/SLP PROGRESS
"Occupational Therapy   Treatment    Name: Marely Hamilton  MRN: [id number]  Admit Date: 2/14/2023  Admitting Diagnosis:  Hepatic encephalopathy    General Precautions: Standard, fall   Orthopedic Precautions: N/A   Braces: N/A    Recommendations:     Discharge Recommendations:  home health OT  Level of Assistance Recommended at Discharge: Intermittent assistance for ADL's and homemaking tasks  Discharge Equipment Recommendations:  (TBD)  Barriers to discharge:  Decreased caregiver support    Assessment:     Marely Hamilton is a 58 y.o. female with a medical diagnosis of Hepatic encephalopathy .  She presents with performance deficits affecting function are weakness, impaired endurance, impaired self care skills, impaired functional mobility, gait instability, impaired balance, impaired cognition, decreased upper extremity function, decreased lower extremity function, decreased safety awareness, impaired cardiopulmonary response to activity.   Pt participated well during today's session. Pt tolerated tx session without incident and is making progress, however, continues to demonstrate deficits with self care skills, balance, functional mobility, UB strength and endurance. Pt will benefit from continued OT services to progress towards goals.     Rehab Potential is good    Activity tolerance:  Good    Plan:     Patient to be seen 6 x/week to address the above listed problems via self-care/home management, therapeutic activities, therapeutic exercises    Plan of Care Expires: 03/17/23  Plan of Care Reviewed with: patient    Subjective   "Good morning"  Communicated with: Rol[place] prior to session.  .    Pain/Comfort:  Pain Rating 1: 0/10  Pain Rating Post-Intervention 1: 0/10    Patient's cultural, spiritual, Orthodox conflicts given the current situation:  no    Objective:     Patient found supine upon OT entry to room.    Bed Mobility:    Patient completed Rolling/Turning to Left with  supervision  Patient completed " Scooting/Bridging with supervision  Patient completed Supine to Sit with supervision     Functional Mobility/Transfers:  Patient completed Sit <> Stand Transfer x 3 with stand by assistance  with  rolling walker   Patient completed Toilet Transfer Step Transfer technique with stand by assistance with  rolling walker    Activities of Daily Living:  Feeding:  set up breakfast     Grooming: stand by assistance to perform oral/facial hygiene in stance at sink with use of RW.   Upper Body Dressing: modified independence to doff/don pullover shirt.   Lower Body Dressing: stand by assistance yo thread B LE and manage pants over hips in stance.  Toileting: minimum assistance for brief management. Pt able to perform brijesh hygiene seated on toilet.     [facility] 6 Click ADL: 19    Treatment & Education:  Pt educated on role of OT, safety while performing functional transfers and self care tasks, and progress towards OT goals.    Patient left up in chair with call button in reach and nsg present    GOALS:   Multidisciplinary Problems       Occupational Therapy Goals          Problem: Occupational Therapy    Goal Priority Disciplines Outcome Interventions   Occupational Therapy Goal     OT, PT/OT Ongoing, Progressing    Description: Goals to be met by: 3/17/2023     Patient will increase functional independence with ADLs by performing:    UE Dressing with [place].- Ongoing  LE Dressing with [place].- Ongoing  Grooming while standing at sink with Modified [place].- Ongoing  Toileting from toilet with Modified [place] for hygiene and clothing management.- Ongoing  Bathing from  shower chair/bench with Stand-by Assistance.- Ongoing  Supine to sit with [place] - Ongoing   Step transfer with Modified [place] with RW. - Ongoing  Toilet transfer to toilet with Modified [place] with RW.- Ongoing                          Time Tracking:     OT Date of Treatment: 02/20/23  OT Start Time: 0750    OT Stop Time:  0816  OT Total Time (min): 26 min    Billable Minutes:Self Care/Home Management 26 2/20/2023

## 2023-02-20 NOTE — PROGRESS NOTES
[facility]                                  Skilled Nursing Facility                   Progress Note     Admit Date: 2/14/2023  BRAYAN   Principal Problem:  Hepatic encephalopathy   HPI obtained from patient interview and chart review     Chief Complaint: Re-evaluation of medical treatment and therapy status: Lab review, evaluation of UCx    HPI:   Ms. Hamilton is a 57 year old female PMHx of alcoholic cirrhosis, hepatic encephalopathy, seizure, bipolar disorder who presents to SNF following hospitalization for hepatic encephalopathy with UTI.  Admission to SNF for secondary weakness and debility.    Interval history: All of today's labs reviewed and are listed below.  24 hr vital sign ranges listed below.  Urine culture reveals E coli, will treat with Macrobid.  Patient denies shortness of breath, abdominal discomfort, nausea, or vomiting.  Patient reports an adequate appetite.  Patient denies dysuria.  Patient reports having regular bowel movements.  Patient progessing with PT/OT. Continuing to follow and treat all acute and chronic conditions.  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    Past Surgical History: Patient has a past surgical history that includes Esophagogastroduodenoscopy and Cosmetic surgery.    Social History: Patient reports that she has never smoked. She has never used smokeless tobacco. She reports that she does not currently use alcohol. She reports that she does not use drugs.    Family History: family history includes Alcohol abuse in her father, paternal grandfather, and sister; Bipolar disorder in her father; Hypertension in her mother; Suicide in her father.    Allergies: Patient is allergic to sulfa (sulfonamide antibiotics) and codeine.    ROS  Constitutional: Negative for fever   Eyes: Negative for blurred vision, double vision   Respiratory: Negative for cough, shortness of breath   Cardiovascular: Negative for chest pain, palpitations, and leg swelling.   Gastrointestinal: Negative for abdominal pain, constipation, diarrhea, nausea, vomiting.   Genitourinary: Negative for dysuria, frequency   Musculoskeletal:  + generalized weakness. Negative for back pain and myalgias.   Skin: Negative for itching and rash.   Neurological: Negative for dizziness, headaches.   Psychiatric/Behavioral: +  for depression. The patient is not nervous/anxious.      24 hour Vital Sign Range   Temp:  [97.6 °F (36.4 °C)-98.2 °F (36.8 °C)]   Pulse:  [71-77]   Resp:  [16-18]   BP: ()/(54-57)   SpO2:  [93 %-95 %]     Current BMI: Body mass index is 19.29 kg/m².    PEx  Constitutional: Patient appears debilitated.  No distress noted  HENT:   Head: Normocephalic and atraumatic.   Eyes: Pupils are equal, round  Neck: Normal range of motion. Neck supple.   Cardiovascular: Normal rate, regular rhythm and normal heart sounds.    Pulmonary/Chest: Effort normal and breath sounds are clear  Abdominal: Soft. Bowel sounds are normal.   Musculoskeletal: Normal range of motion.    Neurological: Alert and oriented to person, place, and time.   Psychiatric: Normal mood and affect. Behavior is normal.   Skin: Skin is warm and dry. Scattered areas of ecchymosis noted, Blanchable redness to sacrum area    Recent Labs   Lab 02/20/23  0539      K 3.7      CO2 23   BUN 8   CREATININE 0.6   MG 1.7       Recent Labs   Lab 02/20/23  0539   WBC 1.52*   RBC 2.41*   HGB 8.2*   HCT 25.4*   PLT 41*   *   MCH 34.0*   MCHC 32.3         Assessment and Plan:    UTI (urinary tract infection)  - 2/9  UA + UC pending. stared on IV ceftriaxone .   - 2/10 . UC - GRAM NEGATIVE BILLY >100,000 cfu/ml Identification and susceptibility pending .ENTEROCOCCUS SPECIES > 100,000 cfu/ml dentification and susceptibility pending. started on amoxicillin  - 2/12 E coli sensitive to ceftriaxone and Enterococcus sensitive to amoxicillin  - complete 2 more doses of amoxicillin at SNF to complete UTI treatment  - 2/20 patient with E coli UTI again, now resistant to amoxicillin, sensitive to Macrobid will treat with that- 100 mg BID x7 days    Cirrhosis, Laennec's  Hepatic encephalopathy (resolved)  - continue lactulose 30g TID with goal of 3-4 bowel movements daily  - Holding home Lasix given concern for dehydration  - Continue spironolactone 25 mg daily  - continue propanolol  - Low-sodium diet, fluid restriction 1500 mL  - Nutrition consulted  - Mentation back to baseline  - PT/OT  - 2/16 ordered ammonia level, 61 from 68  - 2/20 LFTs improved today     Metabolic acidosis  - continue sodium bicarbonate 1300 mg BID    Bipolar 1 disorder, depressed, severe  - Continue home lithium 3 mg qHS., Abilify 10 mg qHS, Seroquel 100 mg qHS.     Malnutrition  - RD consulted  - continue multivitamin     Anemia  - macrocytic anemia.  - Hemoglobin stable.  - Etiology likely due to chronic disease  - continue folic acid, thiamine  - continue monitor twice weekly CBCs, transfuse for hemoglobin < 7    History of seizure  - Continue  home Keppra 1000 mg BID      Thrombocytopenia  - Secondary to cirrhosis   - platelet counts low but stable  - continue monitor twice weekly CBC    GERD  - continue Protonix 40 mg daily    Debility   - Continue with PT/OT for gait training and strengthening and restoration of ADL's   - Encourage mobility, OOB in chair, and early ambulation as appropriate  - Fall precautions   - Monitor for bowel and bladder dysfunction  - Monitor for and prevent skin breakdown and pressure ulcers  - Continue DVT prophylaxis with frequent ambulation, chemical DVT prophylaxis not indicated in this patient with coagulopathies      Anticipate disposition:  Home with home health      Follow-up needed during SNF stay-    Follow-up needed after discharge from SNF: PCP, hepatology    No future appointments.      Robina Esquivel NP  Department of Hospital Medicine   [facility]     DOS: 2/20/2023       Patient note was created using MModal Dictation.  Any errors in syntax or even information may not have been identified and edited on initial review prior to signing this note.

## 2023-02-20 NOTE — PLAN OF CARE
Interdisciplinary team, Vaughn Ibarra, OT, Marquise José Miguel LMSW, Arielle Barger, Activities, and Macie Narayan, Dietician, spoke to patient and patient's sister via phone for care plan conference, weekly status update, and therapy progress update. Tentative discharge date set for 2/28/23.

## 2023-02-20 NOTE — PLAN OF CARE
Family Training     Patient Name:  Marely Hamilton   MRN:  [id number]  Admit Date: 2/14/2023      Rehab tech called to schedule family training. Unable to reach pt's family/caregiver and voicemail left. Will re-attempt to schedule as able.     2/20/2023

## 2023-02-20 NOTE — PT/OT/SLP PROGRESS
Physical Therapy Treatment    Patient Name:  Marely Hamilton   MRN:  [id number]  Admit Date: 2/14/2023  Admitting Diagnosis: Hepatic encephalopathy    General Precautions: Standard, fall  Orthopedic Precautions: N/A  Braces: N/A    Recommendations:     Discharge Recommendations: home with home health  Level of Assistance Recommended at Discharge: Intermittent assistance   Discharge Equipment Recommendations: shower chair  Barriers to discharge: Decreased caregiver support    Assessment:     Marely Hamilton is a 58 y.o. female admitted with a medical diagnosis of Hepatic encephalopathy . Pt continues to make good functional progress and will benefit from continued SNF rehab.      Performance deficits affecting function: weakness, impaired endurance, impaired self care skills, impaired functional mobility, gait instability, impaired balance, decreased upper extremity function, decreased lower extremity function, decreased safety awareness, impaired cardiopulmonary response to activity.    Rehab Potential is good    Activity Tolerance: Good    Plan:     Patient to be seen 6 x/week to address the above listed problems via gait training, therapeutic activities, therapeutic exercises, neuromuscular re-education, wheelchair management/training    Plan of Care Expires: 03/17/23  Plan of Care Reviewed with: patient    Subjective     Pt agreeable to work with PT.     Pain/Comfort:  Pain Rating 1: 0/10  Pain Rating Post-Intervention 1: 0/10    Patient's cultural, spiritual, Hinduism conflicts given the current situation:  no    Objective:     Communicated with pt's nurse prior to session.  Patient found up in chair with   upon PT entry to room.     Therapeutic Activities and Exercises: Mini-eliptical x 15mins with resistance set at medium to help improve B l/e MMT and endurance.    Functional Mobility:  Transfers:     Sit to Stand x multiple trials with RW from w/c:  Supervision with rolling walker  Gait: 112ft +200ft with RW  "and CGA for safety as pt with slightly improved GT speed but with decrease step length and decrease B foot push off during the swing phase of GT.  Stairs:  Pt ascended/descended 4 stair(s) and 4" curb step with Rolling Walker with bilateral handrails with Minimal Assistance   Wheelchair Propulsion:  Pt propelled Standard wheelchair x ~100 feet on Level tile with  Bilateral upper extremity with Moderate Assistance.     AM-PAC 6 CLICK MOBILITY  18    Patient left up in chair with call button in reach.    GOALS:   Multidisciplinary Problems       Physical Therapy Goals          Problem: Physical Therapy    Goal Priority Disciplines Outcome Goal Variances Interventions   Physical Therapy Goal     PT, PT/OT Ongoing, Progressing     Description: Goals to be met by: 3/17/23     Patient will increase functional independence with mobility by performing:    . Supine to sit with [place]  . Sit to supine with [place]  . Rolling to Left and Right with [place].  . Sit to stand transfer with Supervision  . Bed to chair transfer with Supervision using No Assistive Device  . Gait  x 150 feet with Supervision using Rolling Walker.   . Wheelchair propulsion x100 feet with Contact Guard Assistance using bilateral uppper extremities  . Ascend/descend 4 stair with bilateral Handrails Contact Guard Assistance using No Assistive Device.   . Ascend/Descend 4 inch curb step with Contact Guard Assistance using Rolling Walker.                         Time Tracking:     PT Received On: 02/20/23  PT Start Time: 0855  PT Stop Time: 0930  PT Total Time (min): 35 min    Billable Minutes: Gait Training 20 and Therapeutic Exercise 15    Treatment Type: Treatment  PT/PTA: PT     PTA Visit Number: 0     02/20/2023  "

## 2023-02-21 LAB
BACTERIA BLD CULT: NORMAL
BACTERIA BLD CULT: NORMAL

## 2023-02-21 PROCEDURE — 25000003 PHARM REV CODE 250: Performed by: HOSPITALIST

## 2023-02-21 PROCEDURE — 11000004 HC SNF PRIVATE

## 2023-02-21 PROCEDURE — 25000003 PHARM REV CODE 250: Performed by: NURSE PRACTITIONER

## 2023-02-21 RX ADMIN — SODIUM BICARBONATE 1300 MG: 650 TABLET ORAL at 08:02

## 2023-02-21 RX ADMIN — SPIRONOLACTONE 25 MG: 25 TABLET, FILM COATED ORAL at 10:02

## 2023-02-21 RX ADMIN — LACTULOSE 30 G: 20 SOLUTION ORAL at 10:02

## 2023-02-21 RX ADMIN — PANTOPRAZOLE SODIUM 40 MG: 40 TABLET, DELAYED RELEASE ORAL at 10:02

## 2023-02-21 RX ADMIN — NITROFURANTOIN MONOHYDRATE/MACROCRYSTALS 100 MG: 25; 75 CAPSULE ORAL at 10:02

## 2023-02-21 RX ADMIN — ARIPIPRAZOLE 10 MG: 5 TABLET ORAL at 08:02

## 2023-02-21 RX ADMIN — SODIUM BICARBONATE 1300 MG: 650 TABLET ORAL at 10:02

## 2023-02-21 RX ADMIN — LEVETIRACETAM 1000 MG: 500 TABLET, FILM COATED ORAL at 10:02

## 2023-02-21 RX ADMIN — Medication 6 MG: at 08:02

## 2023-02-21 RX ADMIN — THIAMINE HCL TAB 100 MG 100 MG: 100 TAB at 10:02

## 2023-02-21 RX ADMIN — LEVETIRACETAM 1000 MG: 500 TABLET, FILM COATED ORAL at 08:02

## 2023-02-21 RX ADMIN — QUETIAPINE FUMARATE 100 MG: 25 TABLET ORAL at 06:02

## 2023-02-21 RX ADMIN — NITROFURANTOIN MONOHYDRATE/MACROCRYSTALS 100 MG: 25; 75 CAPSULE ORAL at 08:02

## 2023-02-21 RX ADMIN — THERA TABS 1 TABLET: TAB at 10:02

## 2023-02-21 RX ADMIN — LITHIUM CARBONATE 300 MG: 300 TABLET, FILM COATED, EXTENDED RELEASE ORAL at 08:02

## 2023-02-21 RX ADMIN — HYDROXYZINE HYDROCHLORIDE 50 MG: 25 TABLET, FILM COATED ORAL at 08:02

## 2023-02-21 RX ADMIN — FOLIC ACID 1 MG: 1 TABLET ORAL at 10:02

## 2023-02-22 PROCEDURE — 97110 THERAPEUTIC EXERCISES: CPT | Mod: CQ

## 2023-02-22 PROCEDURE — 97530 THERAPEUTIC ACTIVITIES: CPT

## 2023-02-22 PROCEDURE — 25000003 PHARM REV CODE 250: Performed by: HOSPITALIST

## 2023-02-22 PROCEDURE — 97535 SELF CARE MNGMENT TRAINING: CPT

## 2023-02-22 PROCEDURE — 97116 GAIT TRAINING THERAPY: CPT | Mod: CQ

## 2023-02-22 PROCEDURE — 97110 THERAPEUTIC EXERCISES: CPT

## 2023-02-22 PROCEDURE — 11000004 HC SNF PRIVATE

## 2023-02-22 PROCEDURE — 25000003 PHARM REV CODE 250: Performed by: NURSE PRACTITIONER

## 2023-02-22 RX ADMIN — HYDROXYZINE HYDROCHLORIDE 50 MG: 25 TABLET, FILM COATED ORAL at 08:02

## 2023-02-22 RX ADMIN — LEVETIRACETAM 1000 MG: 500 TABLET, FILM COATED ORAL at 08:02

## 2023-02-22 RX ADMIN — SODIUM BICARBONATE 1300 MG: 650 TABLET ORAL at 08:02

## 2023-02-22 RX ADMIN — PANTOPRAZOLE SODIUM 40 MG: 40 TABLET, DELAYED RELEASE ORAL at 08:02

## 2023-02-22 RX ADMIN — Medication 6 MG: at 08:02

## 2023-02-22 RX ADMIN — QUETIAPINE FUMARATE 100 MG: 25 TABLET ORAL at 05:02

## 2023-02-22 RX ADMIN — LACTULOSE 30 G: 20 SOLUTION ORAL at 03:02

## 2023-02-22 RX ADMIN — LITHIUM CARBONATE 300 MG: 300 TABLET, FILM COATED, EXTENDED RELEASE ORAL at 08:02

## 2023-02-22 RX ADMIN — ARIPIPRAZOLE 10 MG: 5 TABLET ORAL at 08:02

## 2023-02-22 RX ADMIN — NITROFURANTOIN MONOHYDRATE/MACROCRYSTALS 100 MG: 25; 75 CAPSULE ORAL at 08:02

## 2023-02-22 RX ADMIN — SPIRONOLACTONE 25 MG: 25 TABLET, FILM COATED ORAL at 02:02

## 2023-02-22 RX ADMIN — THERA TABS 1 TABLET: TAB at 08:02

## 2023-02-22 RX ADMIN — FOLIC ACID 1 MG: 1 TABLET ORAL at 08:02

## 2023-02-22 RX ADMIN — THIAMINE HCL TAB 100 MG 100 MG: 100 TAB at 08:02

## 2023-02-22 NOTE — PT/OT/SLP PROGRESS
"Physical Therapy Treatment    Patient Name:  Marely Hamilton   MRN:  [id number]  Admit Date: 2/14/2023  Admitting Diagnosis: Hepatic encephalopathy    General Precautions: Standard, fall  Orthopedic Precautions: N/A  Braces: N/A    Recommendations:     Discharge Recommendations: home with home health  Level of Assistance Recommended at Discharge: Intermittent assistance   Discharge Equipment Recommendations: shower chair  Barriers to discharge: Decreased caregiver support    Assessment:     Marely Hamilton is a 58 y.o. female admitted with a medical diagnosis of Hepatic encephalopathy .   Pt was agreeable and tolerated therapy session well. Pt ambulated with CGA and RW. Pt has occasional instability but no LOB noted during session. Pt completed gait activity and therex with no issues. Pt is motivated to improve and continues to benefit from therapy to improve functional endurance, strength, and mobility.       Performance deficits affecting function: weakness, impaired endurance, impaired self care skills, impaired functional mobility, gait instability, impaired balance, decreased upper extremity function, decreased lower extremity function, decreased safety awareness, impaired cardiopulmonary response to activity.    Rehab Potential is good    Activity Tolerance: Good    Plan:     Patient to be seen 6 x/week to address the above listed problems via gait training, therapeutic activities, therapeutic exercises, neuromuscular re-education, wheelchair management/training    Plan of Care Expires: 03/17/23  Plan of Care Reviewed with: patient    Subjective     "A bit of pain in my coccyx from laying in bed" and "I used to run all the time"     Pain/Comfort:  Pain Rating 1: 1/10  Location - Orientation 1: generalized  Location 1: coccyx  Pain Addressed 1: Reposition  Pain Rating Post-Intervention 1: 0/10    Patient's cultural, spiritual, Mormonism conflicts given the current situation:  no    Objective:     Patient found " "HOB elevated with  (no active lines) upon PT entry to room.     Therapeutic Activities and Exercises:   Mini elliptical x10 mins (moderate resistance)  To improve BLE endurance, strength, and ROM  Patient educated on role of therapy, goals of session, and benefits of out of bed mobility.   Instructed on use of call button and importance of calling nursing staff for assistance with mobility   Questions/concerns addressed within PTA scope of practice  Pt verbalized understanding.    Functional Mobility:  Bed Mobility:   Supine to Sit: supervision; HOB slightly elevated  Transfers:   Sit <> Stand Transfer: supervision with rolling walker   Bed <> Chair Transfer: stand by assistance with rolling walker using Step Transfer technique   Gait:  Pt ambulated ~200+100 ft with contact guard assistance and rolling walker.  No major LOB  Completed finding rings for visual scanning (~80ft) and weaving around cones (~40ft)  Gait Deviation(s): occasional unsteady gait with slight lateral sway, decrease alex, decrease step length, and decrease heel strike  Verbal/tactile cues for upright posture and pacing  Curb:  Pt ascended/descended 2 trials of  4" curb step with Rolling Walker with Minimal Assistance.   Cues to get closer to the edge and sequencing  [place] for RW management     AM-PAC 6 CLICK MOBILITY  20    Patient left  up in wheelchair in therapy gym  with  OT present for hand-off    GOALS:   Multidisciplinary Problems       Physical Therapy Goals          Problem: Physical Therapy    Goal Priority Disciplines Outcome Goal Variances Interventions   Physical Therapy Goal     PT, PT/OT Ongoing, Progressing     Description: Goals to be met by: 3/17/23     Patient will increase functional independence with mobility by performing:    . Supine to sit with [place]  . Sit to supine with [place]  . Rolling to Left and Right with [place].  . Sit to stand transfer with Supervision  . Bed to chair transfer with " Supervision using No Assistive Device  . Gait  x 150 feet with Supervision using Rolling Walker.   . Wheelchair propulsion x100 feet with Contact Guard Assistance using bilateral uppper extremities  . Ascend/descend 4 stair with bilateral Handrails Contact Guard Assistance using No Assistive Device.   . Ascend/Descend 4 inch curb step with Contact Guard Assistance using Rolling Walker.                         Time Tracking:     PT Received On: 02/22/23  PT Start Time: 1345  PT Stop Time: 1410  PT Total Time (min): 25 min    Billable Minutes: Gait Training 13 and Therapeutic Exercise 12    Treatment Type: Treatment  PT/PTA: PTA     PTA Visit Number: 1     02/22/2023

## 2023-02-22 NOTE — PLAN OF CARE
Problem: Adult Inpatient Plan of Care  Goal: Plan of Care Review  Outcome: Ongoing, Progressing  Goal: Patient-Specific Goal (Individualized)  Outcome: Ongoing, Progressing     Problem: Skin Injury Risk Increased  Goal: Skin Health and Integrity  Outcome: Ongoing, Progressing     Problem: Impaired Wound Healing  Goal: Optimal Wound Healing  Outcome: Ongoing, Progressing     Problem: Malnutrition  Goal: Improved Nutritional Intake  Outcome: Ongoing, Progressing

## 2023-02-22 NOTE — PT/OT/SLP PROGRESS
Occupational Therapy   Treatment    Name: Marely Hamilton  MRN: [id number]  Admit Date: 2/14/2023  Admitting Diagnosis:  Hepatic encephalopathy    General Precautions: Standard, fall   Orthopedic Precautions: N/A   Braces: N/A    Recommendations:     Discharge Recommendations:  home health OT  Level of Assistance Recommended at Discharge: Intermittent assistance for ADL's and homemaking tasks  Discharge Equipment Recommendations:  (TBD)  Barriers to discharge:  Decreased caregiver support    Assessment:     Marely Hamilton is a 58 y.o. female with a medical diagnosis of Hepatic encephalopathy .  She currently limited in the safe performance of self-care , functional mobility and ADLs and currently not performing tasks at PLOF  . Currently presenting with performance deficits including  weakness, impaired endurance, impaired self care skills, impaired functional mobility, gait instability, impaired balance, impaired cognition, decreased upper extremity function, decreased lower extremity function, decreased safety awareness, impaired cardiopulmonary response to activity.   Pt tolerated Tx without incident and is making progress but continues to require assist to perform self care tasks, functional mobility and functional transfers . She would continue to benefit from OT intervention to further her functional (I)ce and safety.     Rehab Potential is good    Activity tolerance:  Good    Plan:     Patient to be seen 6 x/week to address the above listed problems via self-care/home management, therapeutic activities, therapeutic exercises    Plan of Care Expires: 03/17/23  Plan of Care Reviewed with: patient    Subjective     Communicated with: nurse prior to session. .    Pain/Comfort:  Pain Rating 1: 0/10  Pain Rating Post-Intervention 1: 0/10    Patient's cultural, spiritual, Zoroastrianism conflicts given the current situation:  no    Objective:     Patient found up in chair with  (no active lines) upon OT entry to  room.    Bed Mobility:    Patient completed Rolling/Turning to Right with modified independence  Patient completed Scooting/Bridging with modified independence  Patient completed Sit to Supine with modified independence     Functional Mobility/Transfers:  Patient completed Sit <> Stand Transfer with supervision  with  rolling walker   Patient completed Bed <> Chair Transfer using Step Transfer technique with supervision with rolling walker  Functional Mobility: Pt ambulated with RW from therapy gym to her room a distance of 143 ft with (S) provided for safety.  No loss of balance observed but V/Cs required for safety.      Activities of Daily Living:  Lower Body Dressing: supervision with Pt doffing/donning pants and socks with RW to steady when standing.     [facility] 6 Click ADL: 21    OT Exercises: Pt performed UBE exercise for 13  minutes with Min resistance.  UE exercises performed to increase functional endurance and strength in order increase independence when performing self care tasks, functional ambulation, W/C propulsion, and functional standing activities .    Treatment & Education:    Pt worked on functional standing activity consisting of standing with RW while reaching in all planes and  crossing of midline to facilitate (B) wt shifting and stability in standing in prep for performance of self care tasks and functional ADLS in standing.  Pt tolerated up to  15 Min. in standing with (S) and RW to steady.     Patient left supine with call button in reach    GOALS:   Multidisciplinary Problems       Occupational Therapy Goals          Problem: Occupational Therapy    Goal Priority Disciplines Outcome Interventions   Occupational Therapy Goal     OT, PT/OT Ongoing, Progressing    Description: Goals to be met by: 3/17/2023     Patient will increase functional independence with ADLs by performing:    UE Dressing with [place].--MET  LE Dressing with [place]. --Ongoing  Grooming while standing at sink  with Modified [place]. --Ongoing  Toileting from toilet with Modified [place] for hygiene and clothing management. --Ongoing  Bathing from  shower chair/bench with Stand-by Assistance. --Ongoing  Supine to sit with [place].  --MET  Step transfer with Modified [place] with RW. --Ongoing  Toilet transfer to toilet with Modified [place] with RW. --Ongoing                         Time Tracking:     OT Date of Treatment: 02/22/23  OT Start Time: 1415    OT Stop Time: 1455  OT Total Time (min): 40 min    Billable Minutes:Self Care/Home Management 14  Therapeutic Activity 12  Therapeutic Exercise 14    2/22/2023

## 2023-02-22 NOTE — TREATMENT PLAN
Rehab Services' DME recommendations    Marely Hamilton  MRN: [id number]    Pt reports having RW, BSC, and wheelchair.    [x] Shower Chair With back    [x] Home health PT, OT, and Aide    Joan Wall, PTA 2/22/2023

## 2023-02-22 NOTE — PLAN OF CARE
Problem: Occupational Therapy  Goal: Occupational Therapy Goal  Description: Goals to be met by: 3/17/2023     Patient will increase functional independence with ADLs by performing:    UE Dressing with [place].--MET  LE Dressing with [place]. --Ongoing  Grooming while standing at sink with Modified [place]. --Ongoing  Toileting from toilet with Modified [place] for hygiene and clothing management. --Ongoing  Bathing from  shower chair/bench with Stand-by Assistance. --Ongoing  Supine to sit with [place].  --MET  Step transfer with Modified [place] with RW. --Ongoing  Toilet transfer to toilet with Modified [place] with RW. --Ongoing    Outcome: Ongoing, Progressing

## 2023-02-23 LAB
ALBUMIN SERPL BCP-MCNC: 2.1 G/DL (ref 3.5–5.2)
ALP SERPL-CCNC: 108 U/L (ref 55–135)
ALT SERPL W/O P-5'-P-CCNC: 17 U/L (ref 10–44)
ANION GAP SERPL CALC-SCNC: 5 MMOL/L (ref 8–16)
ANISOCYTOSIS BLD QL SMEAR: SLIGHT
AST SERPL-CCNC: 37 U/L (ref 10–40)
BASO STIPL BLD QL SMEAR: ABNORMAL
BASOPHILS # BLD AUTO: 0.01 K/UL (ref 0–0.2)
BASOPHILS NFR BLD: 0.8 % (ref 0–1.9)
BILIRUB SERPL-MCNC: 2.2 MG/DL (ref 0.1–1)
BUN SERPL-MCNC: 6 MG/DL (ref 6–20)
BURR CELLS BLD QL SMEAR: ABNORMAL
CALCIUM SERPL-MCNC: 8.1 MG/DL (ref 8.7–10.5)
CHLORIDE SERPL-SCNC: 115 MMOL/L (ref 95–110)
CO2 SERPL-SCNC: 20 MMOL/L (ref 23–29)
CREAT SERPL-MCNC: 0.5 MG/DL (ref 0.5–1.4)
DIFFERENTIAL METHOD: ABNORMAL
EOSINOPHIL # BLD AUTO: 0 K/UL (ref 0–0.5)
EOSINOPHIL NFR BLD: 3.1 % (ref 0–8)
ERYTHROCYTE [DISTWIDTH] IN BLOOD BY AUTOMATED COUNT: 16 % (ref 11.5–14.5)
EST. GFR  (NO RACE VARIABLE): >60 ML/MIN/1.73 M^2
GLUCOSE SERPL-MCNC: 70 MG/DL (ref 70–110)
HCT VFR BLD AUTO: 24.8 % (ref 37–48.5)
HGB BLD-MCNC: 8.1 G/DL (ref 12–16)
IMM GRANULOCYTES # BLD AUTO: 0 K/UL (ref 0–0.04)
IMM GRANULOCYTES NFR BLD AUTO: 0 % (ref 0–0.5)
LYMPHOCYTES # BLD AUTO: 0.6 K/UL (ref 1–4.8)
LYMPHOCYTES NFR BLD: 45 % (ref 18–48)
MAGNESIUM SERPL-MCNC: 1.7 MG/DL (ref 1.6–2.6)
MCH RBC QN AUTO: 34 PG (ref 27–31)
MCHC RBC AUTO-ENTMCNC: 32.7 G/DL (ref 32–36)
MCV RBC AUTO: 104 FL (ref 82–98)
MONOCYTES # BLD AUTO: 0.1 K/UL (ref 0.3–1)
MONOCYTES NFR BLD: 7.8 % (ref 4–15)
NEUTROPHILS # BLD AUTO: 0.6 K/UL (ref 1.8–7.7)
NEUTROPHILS NFR BLD: 43.3 % (ref 38–73)
NRBC BLD-RTO: 0 /100 WBC
OVALOCYTES BLD QL SMEAR: ABNORMAL
PHOSPHATE SERPL-MCNC: 3 MG/DL (ref 2.7–4.5)
PLATELET # BLD AUTO: 40 K/UL (ref 150–450)
PLATELET BLD QL SMEAR: ABNORMAL
PMV BLD AUTO: 9.2 FL (ref 9.2–12.9)
POIKILOCYTOSIS BLD QL SMEAR: SLIGHT
POLYCHROMASIA BLD QL SMEAR: ABNORMAL
POTASSIUM SERPL-SCNC: 4 MMOL/L (ref 3.5–5.1)
PROT SERPL-MCNC: 4.3 G/DL (ref 6–8.4)
RBC # BLD AUTO: 2.38 M/UL (ref 4–5.4)
SMUDGE CELLS BLD QL SMEAR: PRESENT
SODIUM SERPL-SCNC: 140 MMOL/L (ref 136–145)
WBC # BLD AUTO: 1.29 K/UL (ref 3.9–12.7)

## 2023-02-23 PROCEDURE — 80053 COMPREHEN METABOLIC PANEL: CPT | Performed by: NURSE PRACTITIONER

## 2023-02-23 PROCEDURE — 25000003 PHARM REV CODE 250: Performed by: NURSE PRACTITIONER

## 2023-02-23 PROCEDURE — 84100 ASSAY OF PHOSPHORUS: CPT | Performed by: HOSPITALIST

## 2023-02-23 PROCEDURE — 25000003 PHARM REV CODE 250: Performed by: HOSPITALIST

## 2023-02-23 PROCEDURE — 97110 THERAPEUTIC EXERCISES: CPT | Mod: CO

## 2023-02-23 PROCEDURE — 97530 THERAPEUTIC ACTIVITIES: CPT | Mod: CO

## 2023-02-23 PROCEDURE — 97110 THERAPEUTIC EXERCISES: CPT | Mod: CQ

## 2023-02-23 PROCEDURE — 94761 N-INVAS EAR/PLS OXIMETRY MLT: CPT

## 2023-02-23 PROCEDURE — 97116 GAIT TRAINING THERAPY: CPT | Mod: CQ

## 2023-02-23 PROCEDURE — 85025 COMPLETE CBC W/AUTO DIFF WBC: CPT | Performed by: HOSPITALIST

## 2023-02-23 PROCEDURE — 83735 ASSAY OF MAGNESIUM: CPT | Performed by: HOSPITALIST

## 2023-02-23 PROCEDURE — 11000004 HC SNF PRIVATE

## 2023-02-23 PROCEDURE — 97530 THERAPEUTIC ACTIVITIES: CPT | Mod: CQ

## 2023-02-23 PROCEDURE — 36415 COLL VENOUS BLD VENIPUNCTURE: CPT | Performed by: HOSPITALIST

## 2023-02-23 RX ADMIN — ARIPIPRAZOLE 10 MG: 5 TABLET ORAL at 08:02

## 2023-02-23 RX ADMIN — THERA TABS 1 TABLET: TAB at 09:02

## 2023-02-23 RX ADMIN — THIAMINE HCL TAB 100 MG 100 MG: 100 TAB at 09:02

## 2023-02-23 RX ADMIN — QUETIAPINE FUMARATE 100 MG: 25 TABLET ORAL at 05:02

## 2023-02-23 RX ADMIN — LEVETIRACETAM 1000 MG: 500 TABLET, FILM COATED ORAL at 09:02

## 2023-02-23 RX ADMIN — SODIUM BICARBONATE 1300 MG: 650 TABLET ORAL at 08:02

## 2023-02-23 RX ADMIN — LACTULOSE 30 G: 20 SOLUTION ORAL at 09:02

## 2023-02-23 RX ADMIN — NITROFURANTOIN MONOHYDRATE/MACROCRYSTALS 100 MG: 25; 75 CAPSULE ORAL at 09:02

## 2023-02-23 RX ADMIN — SPIRONOLACTONE 25 MG: 25 TABLET, FILM COATED ORAL at 09:02

## 2023-02-23 RX ADMIN — LACTULOSE 30 G: 20 SOLUTION ORAL at 08:02

## 2023-02-23 RX ADMIN — LEVETIRACETAM 1000 MG: 500 TABLET, FILM COATED ORAL at 08:02

## 2023-02-23 RX ADMIN — Medication 6 MG: at 08:02

## 2023-02-23 RX ADMIN — LITHIUM CARBONATE 300 MG: 300 TABLET, FILM COATED, EXTENDED RELEASE ORAL at 08:02

## 2023-02-23 RX ADMIN — NITROFURANTOIN MONOHYDRATE/MACROCRYSTALS 100 MG: 25; 75 CAPSULE ORAL at 08:02

## 2023-02-23 RX ADMIN — SODIUM BICARBONATE 1300 MG: 650 TABLET ORAL at 09:02

## 2023-02-23 RX ADMIN — HYDROXYZINE HYDROCHLORIDE 50 MG: 25 TABLET, FILM COATED ORAL at 08:02

## 2023-02-23 RX ADMIN — FOLIC ACID 1 MG: 1 TABLET ORAL at 09:02

## 2023-02-23 RX ADMIN — PANTOPRAZOLE SODIUM 40 MG: 40 TABLET, DELAYED RELEASE ORAL at 09:02

## 2023-02-23 NOTE — PLAN OF CARE
Problem: Adult Inpatient Plan of Care  Goal: Plan of Care Review  Outcome: Ongoing, Progressing  Goal: Absence of Hospital-Acquired Illness or Injury  Outcome: Ongoing, Progressing     Problem: Skin Injury Risk Increased  Goal: Skin Health and Integrity  Outcome: Ongoing, Progressing     Problem: Malnutrition  Goal: Improved Nutritional Intake  Outcome: Ongoing, Progressing     POC reviewed with pt who verbalized understanding. AAOx4. VSS on RA. Remains free of falls and injury. No complaints of pain or nausea. Up with walker and standby assist to bathroom. Resting well between care. Avasys in use, bed alarm on, call light in reach, and rounds made for safety. Will continue to monitor.

## 2023-02-23 NOTE — PT/OT/SLP PROGRESS
"Physical Therapy Treatment    Patient Name:  Marely Hamilton   MRN:  [id number]  Admit Date: 2/14/2023  Admitting Diagnosis: Hepatic encephalopathy    General Precautions: Standard, fall  Orthopedic Precautions: N/A  Braces: N/A    Recommendations:     Discharge Recommendations: home with home health  Level of Assistance Recommended at Discharge: Intermittent assistance   Discharge Equipment Recommendations: shower chair  Barriers to discharge: Decreased caregiver support    Assessment:     Marely Hamilton is a 58 y.o. female admitted with a medical diagnosis of Hepatic encephalopathy .   Pt was agreeable and tolerated therapy session well. Pt completed seated and standing therex and picking up activities with no issues. Pt continues to need occasional cues for ascending and descending steps. Pt is progressing well and continues to benefit from therapy to improve functional endurance, strength, and mobility.       Performance deficits affecting function: weakness, impaired endurance, impaired self care skills, impaired functional mobility, gait instability, impaired balance, decreased upper extremity function, decreased lower extremity function, decreased safety awareness, impaired cardiopulmonary response to activity.    Rehab Potential is good    Activity Tolerance: Good    Plan:     Patient to be seen 6 x/week to address the above listed problems via gait training, therapeutic activities, therapeutic exercises, neuromuscular re-education, wheelchair management/training    Plan of Care Expires: 03/17/23  Plan of Care Reviewed with: patient    Subjective     "There's always someone in the house".     Pain/Comfort:  Pain Rating 1: 0/10  Pain Rating Post-Intervention 1: 0/10    Patient's cultural, spiritual, Roman Catholic conflicts given the current situation:  no    Objective:     Patient found  up in wheelchair  with  (Avasys telesitter and no lines) upon PT entry to room.     Therapeutic Activities and Exercises: " "  Seated BLE therex x15 reps: heel/toe raises, LAQ, marching   Standing BLE therex x10 reps: toe tap fwd, side, and back  CGA with RW for safety   2x 5 reps of picking up cylinder blocks   1st trial with RW and reacher, CGA for safety   2nd trial with noAD and bending down, CGA for safety  Cues to step closer to block before attempting to reach down   Mini elliptical x10 mins (moderate resistance)  To improve BLE endurance, strength, and ROM  Patient educated on role of therapy, goals of session, and benefits of out of bed mobility.   Instructed on use of call button and importance of calling nursing staff for assistance with mobility   Questions/concerns addressed within PTA scope of practice  Pt verbalized understanding.    Functional Mobility:  Transfers:   Sit <> Stand Transfer: supervision with rolling walker   Bed <> Chair Transfer: contact guard assistance with no assistive device using Stand Pivot technique   Gait:  Pt ambulated ~205+110 ft with  CGA-closeSBA  and rolling walker.  1 seated rest breaks & no LOB  Gait Deviation(s): occasional unsteady gait with slightly flexed posture, decrease heel strike, and slightly lateral sway   Verbal/tactile cues for upright posture and pacing  Curb:  Pt ascended/descended 4" curb step with Rolling Walker with Minimal Assistance.   Cues to get closer to edge and have RW all the way up/down before moving feet    AM-PAC 6 CLICK MOBILITY  20    Patient left up in chair with call button in reach.    GOALS:   Multidisciplinary Problems       Physical Therapy Goals          Problem: Physical Therapy    Goal Priority Disciplines Outcome Goal Variances Interventions   Physical Therapy Goal     PT, PT/OT Ongoing, Progressing     Description: Goals to be met by: 3/17/23     Patient will increase functional independence with mobility by performing:    . Supine to sit with [place]  . Sit to supine with [place]  . Rolling to Left and Right with [place].  . Sit to " stand transfer with Supervision  . Bed to chair transfer with Supervision using No Assistive Device  . Gait  x 150 feet with Supervision using Rolling Walker.   . Wheelchair propulsion x100 feet with Contact Guard Assistance using bilateral uppper extremities  . Ascend/descend 4 stair with bilateral Handrails Contact Guard Assistance using No Assistive Device.   . Ascend/Descend 4 inch curb step with Contact Guard Assistance using Rolling Walker.                         Time Tracking:     PT Received On: 02/23/23  PT Start Time: 0953  PT Stop Time: 1032  PT Total Time (min): 39 min    Billable Minutes: Gait Training 12, Therapeutic Activity 10, and Therapeutic Exercise 17    Treatment Type: Treatment  PT/PTA: PTA     PTA Visit Number: 2     02/23/2023

## 2023-02-23 NOTE — PLAN OF CARE
Problem: Occupational Therapy  Goal: Occupational Therapy Goal  Description: Goals to be met by: 3/17/2023     Patient will increase functional independence with ADLs by performing:    UE Dressing with [place].--MET  LE Dressing with [place]. --Ongoing  Grooming while standing at sink with Modified [place]. --Ongoing  Toileting from toilet with Modified [place] for hygiene and clothing management. --Ongoing  Bathing from  shower chair/bench with Stand-by Assistance. --Ongoing  Supine to sit with [place].  --MET  Step transfer with Modified [place] with RW. --Ongoing  Toilet transfer to toilet with Modified [place] with RW. --Ongoing    Outcome: Ongoing, Progressing

## 2023-02-23 NOTE — NURSING
Patient refusing her Lactulose tonight despite no BM reported today. Patient educated on the importance of medication and she verbalized understanding. Patient promised she will take medication in the AM. Dr. Miller notified. No new orders at this time. Will continue to monitor.

## 2023-02-23 NOTE — PT/OT/SLP PROGRESS
Occupational Therapy   Treatment    Name: Marely Hamilton  MRN: [id number]  Admit Date: 2/14/2023  Admitting Diagnosis:  Hepatic encephalopathy    General Precautions: Standard, fall   Orthopedic Precautions: N/A   Braces: N/A    Recommendations:     Discharge Recommendations:  home health OT  Level of Assistance Recommended at Discharge: Intermittent assistance for ADL's and homemaking tasks  Discharge Equipment Recommendations:  (TBD)  Barriers to discharge:  Decreased caregiver support    Assessment:     Marely Hamilton is a 58 y.o. female with a medical diagnosis of Hepatic encephalopathy.  She presents with limitations in performance of self-care, functional mobility, and ADLs. Performance deficits affecting function are weakness, impaired endurance, impaired self care skills, impaired functional mobility, gait instability, impaired balance, decreased upper extremity function, decreased lower extremity function, decreased safety awareness, impaired cardiopulmonary response to activity. Pt tolerated Tx without incident and is making progress but continues to require assist to perform self care tasks, functional mobility and functional transfers. Pt would continue to benefit from OT intervention to further functional (I)ce and safety.     Rehab Potential is good    Activity tolerance:  Good    Plan:     Patient to be seen 6 x/week to address the above listed problems via self-care/home management, therapeutic activities, therapeutic exercises    Plan of Care Expires: 03/17/23  Plan of Care Reviewed with: patient    Subjective     Communicated with: Nursing prior to session.    Pain/Comfort:  Pain Rating 1: 0/10  Pain Rating Post-Intervention 1: 0/10    Patient's cultural, spiritual, Restoration conflicts given the current situation:  no    Objective:     Patient found HOB elevated with  (Avasys telesitter and no lines) upon OT entry to room.    Bed Mobility:    Patient completed Scooting/Bridging with modified  independence  Patient completed Supine to Sit with modified independence  Patient completed Sit to Supine with modified independence     Functional Mobility/Transfers:  Patient completed Sit <> Stand Transfer with supervision  with  no assistive device and rolling walker   Patient completed Bed <> Chair <> Bed Transfer using Stand Pivot technique with stand by assistance with no assistive device  Functional Mobility: Pt propelled W/C from room to therapy gym with Min A for a total of 56 ft using BUE to propel chair.    [facility] 6 Click ADL: 21    OT Exercises: Pt performed UBE exercise for 12 minutes with Min resistance.  UE exercises performed to increase functional endurance and strength in order increase independence when performing self care tasks, functional ambulation, W/C propulsion, and functional standing activities .    Treatment & Education:  Pt participated in gross motor standing activity with SBA and RW. Pt using racket and balloon with focus on standing tolerance, functional reaching, dynamic standing bal, crossing midline, and to promote independence with homemaking and self care tasks.    Pt educated on role of OT, safety while performing functional transfers and self care tasks, and progress towards OT goals    Patient left HOB elevated with call button in reach    GOALS:   Multidisciplinary Problems       Occupational Therapy Goals          Problem: Occupational Therapy    Goal Priority Disciplines Outcome Interventions   Occupational Therapy Goal     OT, PT/OT Ongoing, Progressing    Description: Goals to be met by: 3/17/2023     Patient will increase functional independence with ADLs by performing:    UE Dressing with [place].--MET  LE Dressing with [place]. --Ongoing  Grooming while standing at sink with Modified [place]. --Ongoing  Toileting from toilet with Modified [place] for hygiene and clothing management. --Ongoing  Bathing from  shower chair/bench with Stand-by  Assistance. --Ongoing  Supine to sit with [place].  --MET  Step transfer with Modified [place] with RW. --Ongoing  Toilet transfer to toilet with Modified [place] with RW. --Ongoing                         Time Tracking:     OT Date of Treatment: 02/23/23  OT Start Time: 1348    OT Stop Time: 1422  OT Total Time (min): 34 min    Billable Minutes:Therapeutic Activity 19  Therapeutic Exercise 15    2/23/2023  A client care conference was performed between the LOTR and DREA, prior to treatment by DREA, to discuss the patient's status, treatment plan and established goals.

## 2023-02-24 ENCOUNTER — TELEPHONE (OUTPATIENT)
Dept: HEPATOLOGY | Facility: CLINIC | Age: 58
End: 2023-02-24
Payer: MEDICARE

## 2023-02-24 PROCEDURE — 94761 N-INVAS EAR/PLS OXIMETRY MLT: CPT

## 2023-02-24 PROCEDURE — 97535 SELF CARE MNGMENT TRAINING: CPT | Mod: CO

## 2023-02-24 PROCEDURE — 25000003 PHARM REV CODE 250: Performed by: NURSE PRACTITIONER

## 2023-02-24 PROCEDURE — 97110 THERAPEUTIC EXERCISES: CPT | Mod: CQ

## 2023-02-24 PROCEDURE — 97530 THERAPEUTIC ACTIVITIES: CPT | Mod: CQ

## 2023-02-24 PROCEDURE — 11000004 HC SNF PRIVATE

## 2023-02-24 PROCEDURE — 25000003 PHARM REV CODE 250: Performed by: HOSPITALIST

## 2023-02-24 PROCEDURE — 97116 GAIT TRAINING THERAPY: CPT | Mod: CQ

## 2023-02-24 RX ADMIN — QUETIAPINE FUMARATE 100 MG: 25 TABLET ORAL at 06:02

## 2023-02-24 RX ADMIN — NITROFURANTOIN MONOHYDRATE/MACROCRYSTALS 100 MG: 25; 75 CAPSULE ORAL at 08:02

## 2023-02-24 RX ADMIN — ARIPIPRAZOLE 10 MG: 5 TABLET ORAL at 08:02

## 2023-02-24 RX ADMIN — LEVETIRACETAM 1000 MG: 500 TABLET, FILM COATED ORAL at 08:02

## 2023-02-24 RX ADMIN — THERA TABS 1 TABLET: TAB at 08:02

## 2023-02-24 RX ADMIN — Medication 6 MG: at 09:02

## 2023-02-24 RX ADMIN — THIAMINE HCL TAB 100 MG 100 MG: 100 TAB at 08:02

## 2023-02-24 RX ADMIN — SPIRONOLACTONE 25 MG: 25 TABLET, FILM COATED ORAL at 08:02

## 2023-02-24 RX ADMIN — LITHIUM CARBONATE 300 MG: 300 TABLET, FILM COATED, EXTENDED RELEASE ORAL at 08:02

## 2023-02-24 RX ADMIN — SODIUM BICARBONATE 1300 MG: 650 TABLET ORAL at 08:02

## 2023-02-24 RX ADMIN — FOLIC ACID 1 MG: 1 TABLET ORAL at 08:02

## 2023-02-24 RX ADMIN — PROPRANOLOL HYDROCHLORIDE 20 MG: 10 TABLET ORAL at 08:02

## 2023-02-24 RX ADMIN — HYDROXYZINE HYDROCHLORIDE 50 MG: 25 TABLET, FILM COATED ORAL at 08:02

## 2023-02-24 RX ADMIN — PANTOPRAZOLE SODIUM 40 MG: 40 TABLET, DELAYED RELEASE ORAL at 08:02

## 2023-02-24 RX ADMIN — LACTULOSE 30 G: 20 SOLUTION ORAL at 08:02

## 2023-02-24 NOTE — PLAN OF CARE
Problem: Adult Inpatient Plan of Care  Goal: Plan of Care Review  Outcome: Ongoing, Progressing  Goal: Patient-Specific Goal (Individualized)  Outcome: Ongoing, Progressing  Goal: Absence of Hospital-Acquired Illness or Injury  Outcome: Ongoing, Progressing  Goal: Optimal Comfort and Wellbeing  Outcome: Ongoing, Progressing  Goal: Readiness for Transition of Care  Outcome: Ongoing, Progressing     Problem: Skin Injury Risk Increased  Goal: Skin Health and Integrity  Outcome: Ongoing, Progressing     Problem: Impaired Wound Healing  Goal: Optimal Wound Healing  Outcome: Ongoing, Progressing     Problem: Malnutrition  Goal: Improved Nutritional Intake  Outcome: Ongoing, Progressing     Problem: Fall Injury Risk  Goal: Absence of Fall and Fall-Related Injury  Outcome: Ongoing, Progressing   Pt admitted to Skilled Nursing for Hepatic encephalopathy [K76.82] . Patient is AA0X4. Receiving daily PT/OT for strengthening, balance, gait training and ambulation. Pt currently requiring  stand by person assistance,for transfers and ambulation. Patient also requiring stand by person assistance with dressing and set up for grooming and eating. Daily skilled nursing interventions include pain management, medication management, surgical wound management and ADL assistance. Patient rates pain 0/10. Pt is on a low sodium diet, tolerating well. Care plan reviewed and updated. No complaints at this time, will continue to update care and monitor.

## 2023-02-24 NOTE — PROGRESS NOTES
[facility]                                  Skilled Nursing Facility                   Progress Note     Admit Date: 2/14/2023  BRAYAN 2/28/2023  Principal Problem:  Hepatic encephalopathy   HPI obtained from patient interview and chart review     Chief Complaint: Re-evaluation of medical treatment and therapy status: Lab review, re-evaluation of swollen ankles    HPI:   Ms. Hamilton is a 57 year old female PMHx of alcoholic cirrhosis, hepatic encephalopathy, seizure, bipolar disorder who presents to SNF following hospitalization for hepatic encephalopathy with UTI.  Admission to SNF for secondary weakness and debility.    Interval history: All of today's labs reviewed and are listed below.  Continue leukopenia at 1.29, bicarb 20,  24 hr vital sign ranges listed below.  24 hour blood pressure range is 99/50 to 105/55, patient asymptomatic.  Yesterday, patient noted to have swollen ankles, Seymour hose applied.  Today, very mild improvement noted.  Seymour hose removed at Ace bandage applied to foot and ankle to provide more concise compression, patient instructed to elevate.  Her blood pressure is too low for me to give any other diuretics at this time, she is on her home dose of spironolactone.  Patient denies shortness of breath, abdominal discomfort, nausea, or vomiting.  Patient reports an adequate appetite.  Patient denies dysuria.  Patient reports having regular bowel movements.  Patient progessing with PT/OT- ambulated ~205+110 ft with CGA-closeSBA  and rolling walker. Continuing to follow and treat all acute and chronic conditions.      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    Past Surgical History: Patient has a past surgical history that includes Esophagogastroduodenoscopy and Cosmetic surgery.    Social History: Patient reports that she has never smoked. She has never used smokeless tobacco. She reports that she does not currently use alcohol. She reports that she does not use drugs.    Family History: family history includes Alcohol abuse in her father, paternal grandfather, and sister; Bipolar disorder in her father; Hypertension in her mother; Suicide in her father.    Allergies: Patient is allergic to sulfa (sulfonamide antibiotics) and codeine.    ROS  Constitutional: Negative for fever   Eyes: Negative for blurred vision, double vision   Respiratory: Negative for cough, shortness of breath   Cardiovascular: Negative for chest pain, palpitations, and leg swelling.   Gastrointestinal: Negative for abdominal pain, constipation, diarrhea, nausea, vomiting.   Genitourinary: Negative for dysuria, frequency   Musculoskeletal:  + generalized weakness. Negative for back pain and myalgias.    Skin: Negative for itching and rash.   Neurological: Negative for dizziness, headaches.   Psychiatric/Behavioral: +  for depression. The patient is not nervous/anxious.      24 hour Vital Sign Range   Temp:  [98.1 °F (36.7 °C)-98.8 °F (37.1 °C)]   Pulse:  [77-80]   Resp:  [16-18]   BP: ()/(50-55)   SpO2:  [93 %-94 %]     Current BMI: Body  mass index is 19.37 kg/m².    PEx  Constitutional: Patient appears debilitated.  No distress noted  HENT:   Head: Normocephalic and atraumatic.   Eyes: Pupils are equal, round  Neck: Normal range of motion. Neck supple.   Cardiovascular: Normal rate, regular rhythm and normal heart sounds.    Pulmonary/Chest: Effort normal and breath sounds are clear  Abdominal: Soft. Bowel sounds are normal.   Musculoskeletal: Normal range of motion.   Neurological: Alert and oriented to person, place, and time.   Psychiatric: Normal mood and affect. Behavior is normal.   Skin: Skin is warm and dry. Scattered areas of ecchymosis noted, Blanchable redness to sacrum area. +bilateral ankle swelling    Recent Labs   Lab 02/23/23  0442      K 4.0   *   CO2 20*   BUN 6   CREATININE 0.5   MG 1.7       Recent Labs   Lab 02/23/23  0442   WBC 1.29*   RBC 2.38*   HGB 8.1*   HCT 24.8*   PLT 40*   *   MCH 34.0*   MCHC 32.7         Assessment and Plan:      Ankle swelling  - bilateral, compressive wraps applied today, continue spironolactone    UTI (urinary tract infection)  - 2/9  UA + UC pending. stared on IV ceftriaxone .   - 2/10 . UC - GRAM NEGATIVE BILLY >100,000 cfu/ml Identification and susceptibility pending .ENTEROCOCCUS SPECIES > 100,000 cfu/ml dentification and susceptibility pending. started on amoxicillin  - 2/12 E coli sensitive to ceftriaxone and Enterococcus sensitive to amoxicillin  - complete 2 more doses of amoxicillin at SNF to complete UTI treatment  - 2/20 patient with E coli UTI again, now resistant to amoxicillin, sensitive to Macrobid will treat with that- 100 mg BID x7 days    Cirrhosis, Laennec's  Hepatic encephalopathy (resolved)  - continue lactulose 30g TID with goal of 3-4 bowel movements daily  - Holding home Lasix given concern for dehydration  - Continue spironolactone 25 mg daily  - continue propanolol  - Low-sodium diet, fluid restriction 1500 mL  - Nutrition consulted  - Mentation back to  baseline  - PT/OT  - 2/16 ordered ammonia level, 61 from 68  - 2/23 LFTs stable; ambulatory referral placed for hepatology, patient has been lost to follow-up with hepatology for the last few years, patient's sister states they are having trouble finding a provider that they like, patient's PCP has been refilling her lactulose, message sent to  to set patient up with appointment    Metabolic acidosis  - continue sodium bicarbonate 1300 mg BID    Bipolar 1 disorder, depressed, severe  - Continue home lithium 3 mg qHS., Abilify 10 mg qHS, Seroquel 100 mg qHS.     Malnutrition  - RD consulted  - continue multivitamin     Anemia  - macrocytic anemia.  - Hemoglobin stable.  - Etiology likely due to chronic disease  - continue folic acid, thiamine  - continue monitor twice weekly CBCs, transfuse for hemoglobin < 7    History of seizure  - Continue home Keppra 1000 mg BID      Thrombocytopenia  - Secondary to cirrhosis   - platelet counts low but stable  - continue monitor twice weekly CBC    GERD  - continue Protonix 40 mg daily    Debility   - Continue with PT/OT for gait training and strengthening and restoration of ADL's   - Encourage mobility, OOB in chair, and early ambulation as appropriate  - Fall precautions   - Monitor for bowel and bladder dysfunction  - Monitor for and prevent skin breakdown and pressure ulcers  - Continue DVT prophylaxis with frequent ambulation, chemical DVT prophylaxis not indicated in this patient with coagulopathies      Anticipate disposition:  Home with home health      Follow-up needed during SNF stay-    Follow-up needed after discharge from SNF: PCP, hepatology    No future appointments.      Robina Esquivel NP  Department of Hospital Medicine   [facility]     DOS: 2/23/2023       Patient note was created using MModal Dictation.  Any errors in syntax or even information may not have been identified and edited on initial review prior to  signing this note.

## 2023-02-24 NOTE — PT/OT/SLP PROGRESS
"Occupational Therapy   Treatment    Name: Marely Hamilton  MRN: [id number]  Admit Date: 2/14/2023  Admitting Diagnosis:  Hepatic encephalopathy    General Precautions: Standard, fall   Orthopedic Precautions: N/A   Braces: N/A    Recommendations:     Discharge Recommendations:  home health OT  Level of Assistance Recommended at Discharge: Intermittent assistance for ADL's and homemaking tasks  Discharge Equipment Recommendations:  (TBD)  Barriers to discharge:  Decreased caregiver support    Assessment:     Marely Hamilton is a 58 y.o. female with a medical diagnosis of Hepatic encephalopathy .  She presents with performance deficits affecting function are weakness, impaired endurance, impaired self care skills, impaired functional mobility, gait instability, impaired balance, decreased upper extremity function, decreased lower extremity function, decreased safety awareness, impaired cardiopulmonary response to activity.     Pt participated well during today's session. Pt tolerated tx session without incident and is making progress, however, continues to demonstrate deficits with self care skills, balance, functional mobility, UB strength and endurance. Pt will benefit from continued OT services to progress towards goals.     Rehab Potential is good    Activity tolerance:  Good    Plan:     Patient to be seen 6 x/week to address the above listed problems via self-care/home management, therapeutic activities, therapeutic exercises    Plan of Care Expires: 03/17/23  Plan of Care Reviewed with: patient    Subjective   "I'm ready to go home"  Communicated with: Rolando prior to session.  .    Pain/Comfort:  Pain Rating 1: 0/10  Pain Rating Post-Intervention 1: 0/10    Patient's cultural, spiritual, Pentecostal conflicts given the current situation:  no    Objective:     Patient found HOB elevated upon OT entry to room.    Bed Mobility:    Patient completed Rolling/Turning to Right with modified independence  Patient completed " Scooting/Bridging with modified independence  Patient completed Supine to Sit with modified independence     Functional Mobility/Transfers:  Patient completed Sit <> Stand Transfer with supervision  with  rolling walker   Functional Mobility: Pt ambulated approx 20 ft in room with supervision and RW.     Activities of Daily Living:  Feeding:  set up breakfast. Opened containers and utensils.   Grooming: supervision to perform oral/facial hygiene.     [facility] 6 Click ADL: 21    Treatment & Education:  Pt performed grooming in stance for approx ~ 10 min at sink with focus on standing tolerance, dynamic standing bal, crossing midline, and to promote independence with homemaking and self care tasks.   Pt educated on role of OT, safety while performing functional transfers and self care tasks, and progress towards OT goals.    Patient left up in chair with call button in reach and PCT notified    GOALS:   Multidisciplinary Problems       Occupational Therapy Goals          Problem: Occupational Therapy    Goal Priority Disciplines Outcome Interventions   Occupational Therapy Goal     OT, PT/OT Ongoing, Progressing    Description: Goals to be met by: 3/17/2023     Patient will increase functional independence with ADLs by performing:    UE Dressing with [place].--MET  LE Dressing with [place]. --Ongoing  Grooming while standing at sink with Modified [place]. --Ongoing  Toileting from toilet with Modified [place] for hygiene and clothing management. --Ongoing  Bathing from  shower chair/bench with Stand-by Assistance. --Ongoing  Supine to sit with [place].  --MET  Step transfer with Modified [place] with RW. --Ongoing  Toilet transfer to toilet with Modified [place] with RW. --Ongoing                         Time Tracking:     OT Date of Treatment: 02/24/23  OT Start Time: 0750    OT Stop Time: 0813  OT Total Time (min): 23 min    Billable Minutes:Self Care/Home Management 23 2/24/2023

## 2023-02-24 NOTE — PROGRESS NOTES
[facility]                                  Skilled Nursing Facility                   Progress Note     Admit Date: 2/14/2023  BRAYAN 2/28/2023  Principal Problem:  Hepatic encephalopathy   HPI obtained from patient interview and chart review     Chief Complaint: Re-evaluation of medical treatment and therapy status: re-evaluation of swollen ankles    HPI:   Ms. Hamilton is a 57 year old female PMHx of alcoholic cirrhosis, hepatic encephalopathy, seizure, bipolar disorder who presents to SNF following hospitalization for hepatic encephalopathy with UTI.  Admission to SNF for secondary weakness and debility.    Interval history: All of today's labs reviewed and are listed below.  24 hr vital sign ranges listed below.  Ankle swelling has greatly decreased after providing compressive wraps yesterday.  Edema is nearly resolved.  Patient denies shortness of breath, abdominal discomfort, nausea, or vomiting.  Patient reports an adequate appetite.  Patient denies dysuria.  Patient reports having regular bowel movements.  Patient progessing with PT/OT- Gait: amb with RW CG/close SBA ~ 150 ft thomas include curb step and 120 ft to include 4 steps seated rest break no LOB. Continuing to follow and treat all acute and chronic conditions.      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    Past Surgical History: Patient has a past surgical history that includes Esophagogastroduodenoscopy and Cosmetic surgery.    Social History: Patient reports that she has never smoked. She has never used smokeless tobacco. She reports that she does not currently use alcohol. She reports that she does not use drugs.    Family History: family history includes Alcohol abuse in her father, paternal grandfather, and sister; Bipolar disorder in her father; Hypertension in her mother; Suicide in her father.    Allergies: Patient is allergic to sulfa (sulfonamide antibiotics) and codeine.    ROS  Constitutional: Negative for fever   Eyes: Negative for blurred vision, double vision   Respiratory: Negative for cough, shortness of breath   Cardiovascular: Negative for chest pain, palpitations, and leg swelling.   Gastrointestinal: Negative for abdominal pain, constipation, diarrhea, nausea, vomiting.   Genitourinary: Negative for dysuria, frequency   Musculoskeletal:  + generalized weakness. Negative for back pain and myalgias.    Skin: Negative for itching and rash.   Neurological: Negative for dizziness, headaches.   Psychiatric/Behavioral: +  for depression. The patient is not nervous/anxious.      24 hour Vital Sign Range   Temp:  [97.6 °F (36.4 °C)-98.3 °F (36.8 °C)]   Pulse:  [80-85]   Resp:  [16-18]   BP: ()/(55-57)   SpO2:  [94 %-97 %]     Current BMI: Body mass index is 19.33 kg/m².    PEx  Constitutional: Patient appears debilitated.  No distress noted  HENT:   Head: Normocephalic and atraumatic.   Eyes: Pupils are equal, round  Neck: Normal range of motion. Neck supple.   Cardiovascular: Normal rate, regular rhythm and normal heart sounds.     Pulmonary/Chest: Effort normal and breath sounds are clear  Abdominal: Soft. Bowel sounds are normal.   Musculoskeletal: Normal range of motion.   Neurological: Alert and oriented to person, place, and time.   Psychiatric: Normal mood and affect. Behavior is normal.   Skin: Skin is warm and dry. Scattered areas of ecchymosis noted, Blanchable redness to sacrum area. +bilateral ankle swelling, improved    No results for input(s): GLUCOSE, NA, K, CL, CO2, BUN, CREATININE, MG in the last 24 hours.    Invalid input(s):  CALCIUM      No results for input(s): WBC, RBC, HGB, HCT, PLT, MCV, MCH, MCHC in the last 24 hours.        Assessment and Plan:      Ankle swelling  - improved, bilateral, compressive wraps applied 2/23, continue spironolactone    UTI (urinary tract infection)  - 2/9  UA + UC pending. stared on IV ceftriaxone .   - 2/10 . UC - GRAM NEGATIVE BILLY >100,000 cfu/ml Identification and susceptibility pending .ENTEROCOCCUS SPECIES > 100,000 cfu/ml dentification and susceptibility pending. started on amoxicillin  - 2/12 E coli sensitive to ceftriaxone and Enterococcus sensitive to amoxicillin  - complete 2 more doses of amoxicillin at SNF to complete UTI treatment  - 2/20 patient with E coli UTI again, now resistant to amoxicillin, sensitive to Macrobid will treat with that- 100 mg BID x7 days    Cirrhosis, Laennec's  Hepatic encephalopathy (resolved)  - continue lactulose 30g TID with goal of 3-4 bowel movements daily  - Holding home Lasix given concern for dehydration  - Continue spironolactone 25 mg daily  - continue propanolol  - Low-sodium diet, fluid restriction 1500 mL  - Nutrition consulted  - Mentation back to baseline  - PT/OT  - 2/16 ordered ammonia level, 61 from 68  - 2/23 LFTs stable; ambulatory referral placed for hepatology, patient has been lost to follow-up with hepatology for the last few years, patient's sister states they are having trouble finding a provider that they like, patient's PCP  has been refilling her lactulose, message sent to  to set patient up with appointment  - 2/24 per hepatology office, 1st available will be in April, they will not schedule patient's appointment until patient is discharged from facility    Metabolic acidosis  - continue sodium bicarbonate 1300 mg BID    Bipolar 1 disorder, depressed, severe  - Continue home lithium 3 mg qHS., Abilify 10 mg qHS, Seroquel 100 mg qHS.     Malnutrition  - RD consulted  - continue multivitamin     Anemia  - macrocytic anemia.  - Hemoglobin stable.  - Etiology likely due to chronic disease  - continue folic acid, thiamine  - continue monitor twice weekly CBCs, transfuse for hemoglobin < 7    History of seizure  - Continue home Keppra 1000 mg BID      Thrombocytopenia  - Secondary to cirrhosis   - platelet counts low but stable  - continue monitor twice weekly CBC    GERD  - continue Protonix 40 mg daily    Debility   - Continue with PT/OT for gait training and strengthening and restoration of ADL's   - Encourage mobility, OOB in chair, and early ambulation as appropriate  - Fall precautions   - Monitor for bowel and bladder dysfunction  - Monitor for and prevent skin breakdown and pressure ulcers  - Continue DVT prophylaxis with frequent ambulation, chemical DVT prophylaxis not indicated in this patient with coagulopathies      Anticipate disposition:  Home with home health      Follow-up needed during SNF stay-    Follow-up needed after discharge from SNF: PCP, hepatology    No future appointments.      Robina Esquivel NP  Department of Hospital Medicine   [facility]- Skilled Nursing Facility     DOS: 2/24/2023       Patient note was created using MModal Dictation.  Any errors in syntax or even information may not have been identified and edited on initial review prior to signing this note.

## 2023-02-24 NOTE — PLAN OF CARE
"[facility] - Skilled Nursing      HOME HEALTH ORDERS  FACE TO FACE ENCOUNTER    Patient Name: Marely Hamilton  YOB: 1965    PCP: Viktor Ross MD   PCP Address: [address] / [place] [address]  PCP Phone Number: [phone]  PCP Fax: [phone]    Encounter Date: 2/14/23    Admit to Home Health    Diagnoses:  Active Hospital Problems    Diagnosis  POA    *Hepatic encephalopathy [K76.82]  Yes    Metabolic acidosis [E87.20]  Yes    UTI (urinary tract infection) [N39.0]  Yes    Bipolar disorder, manic [F31.10]  Yes    Bipolar 1 disorder [F31.9]  Yes    Elevated LFTs [R79.89]  Yes    Weakness [R53.1]  Yes    Malnutrition [E46]  Yes    Chronic liver failure [K72.11]  Yes    Anemia [D64.9]  Yes     6 units PRBC since June 2015      History of seizure [Z87.898]  Yes     One seizure 2013- "low blood sugar from a starvation diet"      Thrombocytopenia [D69.6]  Yes    Cirrhosis, Laennec's [K70.30]  Yes      Resolved Hospital Problems   No resolved problems to display.       Follow Up Appointments:  No future appointments.    Allergies:  Review of patient's allergies indicates:   Allergen Reactions    Sulfa (sulfonamide antibiotics) Rash    Codeine Nausea And Vomiting       Medications: Review discharge medications with patient and family and provide education.      I have seen and examined this patient within the last 30 days. My clinical findings that support the need for the home health skilled services and home bound status are the following:no   Weakness/numbness causing balance and gait disturbance due to Liver Disease making it taxing to leave home.     Referrals/ Consults  Physical Therapy to evaluate and treat. Evaluate for home safety and equipment needs; Establish/upgrade home exercise program. Perform / instruct on therapeutic exercises, gait training, transfer training, and Range of Motion.  Occupational Therapy to evaluate and treat. Evaluate home environment for safety and equipment " needs. Perform/Instruct on transfers, ADL training, ROM, and therapeutic exercises.  Aide to provide assistance with personal care, ADLs, and vital signs.    Activities:   activity as tolerated    Nursing:   Agency to admit patient within 24 hours of hospital discharge unless specified on physician order or at patient request    SN to complete comprehensive assessment including routine vital signs. Instruct on disease process and s/s of complications to report to MD. Review/verify medication list sent home with the patient at time of discharge  and instruct patient/caregiver as needed. Frequency may be adjusted depending on start of care date.     Skilled nurse to perform up to 3 visits PRN for symptoms related to diagnosis    Notify MD if SBP > 160 or < 90; DBP > 90 or < 50; HR > 120 or < 50; Temp > 101; O2 < 88%    Ok to schedule additional visits based on staff availability and patient request on consecutive days within the home health episode.      Home Health Aide:  Nursing Three times weekly, Physical Therapy Three times weekly, Occupational Therapy Three times weekly, and Home Health Aide Three times weekly    Wound Care Orders  no    I certify that this patient is confined to her home and needs intermittent skilled nursing care, physical therapy, and occupational therapy.

## 2023-02-24 NOTE — PLAN OF CARE
Problem: Adult Inpatient Plan of Care  Goal: Plan of Care Review  Outcome: Ongoing, Progressing  Goal: Absence of Hospital-Acquired Illness or Injury  Outcome: Ongoing, Progressing     Problem: Skin Injury Risk Increased  Goal: Skin Health and Integrity  Outcome: Ongoing, Progressing     Problem: Malnutrition  Goal: Improved Nutritional Intake  Outcome: Ongoing, Progressing     Problem: Fall Injury Risk  Goal: Absence of Fall and Fall-Related Injury  Outcome: Ongoing, Progressing   POC reviewed with pt who verbalized understanding. AAOx4. VSS on RA. Remains free of falls and injury. No complaints of pain or nausea. Up with standby assist and walker to bathroom; BM 1x reported this shift. Resting well between care. Avasys in use, bed alarm on, call light in reach and rounds made for safety. Will continue to monitor.

## 2023-02-24 NOTE — PROGRESS NOTES
[facility]                                  Skilled Nursing Facility                   Progress Note     Admit Date: 2/14/2023  BRAYAN 2/28/2023  Principal Problem:  Hepatic encephalopathy   HPI obtained from patient interview and chart review     Chief Complaint: Re-evaluation of medical treatment and therapy status:  Patient reports swollen ankles    HPI:   Ms. Hamilton is a 57 year old female PMHx of alcoholic cirrhosis, hepatic encephalopathy, seizure, bipolar disorder who presents to SNF following hospitalization for hepatic encephalopathy with UTI.  Admission to SNF for secondary weakness and debility.    Interval history:  24 hr vital sign ranges listed below.   Today, patient reports swollen ankles, Seymour hose applied. Patient denies shortness of breath, abdominal discomfort, nausea, or vomiting.  Patient reports an adequate appetite.  Patient denies dysuria.  Patient reports having regular bowel movements.  Patient progessing with PT/OT. Continuing to follow and treat all acute and chronic conditions.      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    Past Surgical History: Patient has a past surgical history that includes Esophagogastroduodenoscopy and Cosmetic surgery.    Social History: Patient reports that she has never smoked. She has never used smokeless tobacco. She reports that she does not currently use alcohol. She reports that she does not use drugs.    Family History: family history includes Alcohol abuse in her father, paternal grandfather, and sister; Bipolar disorder in her father; Hypertension in her mother; Suicide in her father.    Allergies: Patient is allergic to sulfa (sulfonamide antibiotics) and codeine.    ROS  Constitutional: Negative for fever   Eyes: Negative for blurred vision, double vision   Respiratory: Negative for cough, shortness of breath   Cardiovascular: Negative for chest pain, palpitations, and leg swelling.   Gastrointestinal: Negative for abdominal pain, constipation, diarrhea, nausea, vomiting.   Genitourinary: Negative for dysuria, frequency   Musculoskeletal:  + generalized weakness. Negative for back pain and myalgias.    Skin: Negative for itching and rash.   Neurological: Negative for dizziness, headaches.   Psychiatric/Behavioral: +  for depression. The patient is not nervous/anxious.      24 hour Vital Sign Range   Temp:  [98.1 °F (36.7 °C)-98.8 °F (37.1 °C)]   Pulse:  [77-80]   Resp:  [16-18]   BP: ()/(50-55)   SpO2:  [93 %-94 %]     Current BMI: Body mass index is 19.37 kg/m².    PEx  Constitutional: Patient appears debilitated.  No distress noted  HENT:   Head: Normocephalic and atraumatic.   Eyes: Pupils are equal, round  Neck: Normal range of motion. Neck supple.   Cardiovascular: Normal rate, regular rhythm and normal heart sounds.    Pulmonary/Chest: Effort normal and breath sounds are clear  Abdominal: Soft. Bowel sounds are normal.   Musculoskeletal: Normal range of motion.   Neurological: Alert and oriented to person, place, and time.   Psychiatric: Normal mood and  affect. Behavior is normal.   Skin: Skin is warm and dry. Scattered areas of ecchymosis noted, Blanchable redness to sacrum area. +bilateral ankle swelling      Assessment and Plan:      Ankle swelling  - bilateral, Seymour domingueze applied, continue spironolactone    UTI (urinary tract infection)  - 2/9  UA + UC pending. stared on IV ceftriaxone .   - 2/10 . UC - GRAM NEGATIVE BILLY >100,000 cfu/ml Identification and susceptibility pending .ENTEROCOCCUS SPECIES > 100,000 cfu/ml dentification and susceptibility pending. started on amoxicillin  - 2/12 E coli sensitive to ceftriaxone and Enterococcus sensitive to amoxicillin  - complete 2 more doses of amoxicillin at [facility] to complete UTI treatment  - 2/20 patient with E coli UTI again, now resistant to amoxicillin, sensitive to Macrobid will treat with that- 100 mg BID x7 days    Cirrhosis, Laennec's  Hepatic encephalopathy (resolved)  - continue lactulose 30g TID with goal of 3-4 bowel movements daily  - Holding home Lasix given concern for dehydration  - Continue spironolactone 25 mg daily  - continue propanolol  - Low-sodium diet, fluid restriction 1500 mL  - Nutrition consulted  - Mentation back to baseline  - PT/OT  - 2/16 ordered ammonia level, 61 from 68    Metabolic acidosis  - continue sodium bicarbonate 1300 mg BID    Bipolar 1 disorder, depressed, severe  - Continue home lithium 3 mg qHS., Abilify 10 mg qHS, Seroquel 100 mg qHS.     Malnutrition  - RD consulted  - continue multivitamin     Anemia  - macrocytic anemia.  - Hemoglobin stable.  - Etiology likely due to chronic disease  - continue folic acid, thiamine  - continue monitor twice weekly CBCs, transfuse for hemoglobin < 7    History of seizure  - Continue home Keppra 1000 mg BID      Thrombocytopenia  - Secondary to cirrhosis   - platelet counts low but stable  - continue monitor twice weekly CBC    GERD  - continue Protonix 40 mg daily    Debility   - Continue with PT/OT for gait training and strengthening  and restoration of ADL's   - Encourage mobility, OOB in chair, and early ambulation as appropriate  - Fall precautions   - Monitor for bowel and bladder dysfunction  - Monitor for and prevent skin breakdown and pressure ulcers  - Continue DVT prophylaxis with frequent ambulation, chemical DVT prophylaxis not indicated in this patient with coagulopathies      Anticipate disposition:  Home with home health      Follow-up needed during SNF stay-    Follow-up needed after discharge from SNF: PCP, hepatology    No future appointments.      Robina Esquivel NP  Department of Hospital Medicine   [facility]     DOS: 2/22/2023       Patient note was created using MModal Dictation.  Any errors in syntax or even information may not have been identified and edited on initial review prior to signing this note.

## 2023-02-24 NOTE — TELEPHONE ENCOUNTER
Referral to Hepatology for decompensated cirrhosis due to alcohol, hepatic encephalopathy. Previously seen by Dr. Ruggiero (last in 2016) though was then to f/u with Joe.    Scheduling attempt # 1    Please call patient to schedule appointment with Dr. Ruggiero. Thanks!

## 2023-02-24 NOTE — PT/OT/SLP PROGRESS
"Physical Therapy Treatment    Patient Name:  Marely Hamilton   MRN:  [id number]  Admit Date: 2/14/2023  Admitting Diagnosis: Hepatic encephalopathy  Recent Surgeries:     General Precautions: Standard, fall  Orthopedic Precautions: N/A  Braces: N/A    Recommendations:     Discharge Recommendations: home with home health  Level of Assistance Recommended at Discharge: Intermittent assistance   Discharge Equipment Recommendations: shower chair  Barriers to discharge: Decreased caregiver support    Assessment:     Marely Hamilton is a 58 y.o. female admitted with a medical diagnosis of Hepatic encephalopathy . Pt tolerated well, pt would continue to benefit from skilled PT services to improve overall functional mobility, strength and endurance.  .      Performance deficits affecting function: weakness, impaired endurance, impaired self care skills, impaired functional mobility, gait instability, impaired balance, decreased upper extremity function, decreased lower extremity function, decreased safety awareness, impaired cardiopulmonary response to activity.    Rehab Potential is good    Activity Tolerance: Fair    Plan:     Patient to be seen 6 x/week to address the above listed problems via gait training, therapeutic activities, therapeutic exercises, neuromuscular re-education, wheelchair management/training    Plan of Care Expires: 03/17/23  Plan of Care Reviewed with: patient    Subjective     "Good". Pt with concerns about swelling in feet and numbness inquiring about fluid pill lasix, nsg notified    Pain/Comfort:  Pain Rating 1: 0/10  Pain Rating Post-Intervention 1: 0/10    Patient's cultural, spiritual, Mosque conflicts given the current situation:  no    Objective:     Patient found with  (in BSC) upon PT entry to room.     Therapeutic Activities and Exercises: mini elliptical x 15 min with medium resistance    Functional Mobility:  Transfers:     Sit to Stand:  supervision with rolling walker  BSC to hair: " "stand by assistance with  rolling walker  using  Stand Pivot and vcs to keep RW closer and not to side sit, WC to BSC no AD SBA using arm rest for stability, vcs for safety/tech/positioning  Gait: amb with RW CG/close SBA ~ 150 ft thomas include curb step and 120 ft to include 4 steps seated rest break no LOB  Stairs:  asc/cris 4 steps with BHR SBA  Curb asc/cris 4" curb with RW CGA vcs for safety/tech    AM-PAC 6 CLICK MOBILITY  19    Patient left up in chair with call button in reach and belonging sin reach . Camera notified    GOALS:   Multidisciplinary Problems       Physical Therapy Goals          Problem: Physical Therapy    Goal Priority Disciplines Outcome Goal Variances Interventions   Physical Therapy Goal     PT, PT/OT Ongoing, Progressing     Description: Goals to be met by: 3/17/23     Patient will increase functional independence with mobility by performing:    . Supine to sit with Craig  . Sit to supine with Craig  . Rolling to Left and Right with Craig.  . Sit to stand transfer with Supervision  . Bed to chair transfer with Supervision using No Assistive Device  . Gait  x 150 feet with Supervision using Rolling Walker.   . Wheelchair propulsion x100 feet with Contact Guard Assistance using bilateral uppper extremities  . Ascend/descend 4 stair with bilateral Handrails Contact Guard Assistance using No Assistive Device.   . Ascend/Descend 4 inch curb step with Contact Guard Assistance using Rolling Walker.                         Time Tracking:     PT Received On: 02/24/23  PT Start Time: 0833  PT Stop Time: 0914  PT Total Time (min): 41 min    Billable Minutes: Gait Training 15, Therapeutic Activity 11, and Therapeutic Exercise 15    Treatment Type: Treatment  PT/PTA: PTA     PTA Visit Number: 3     02/24/2023  "

## 2023-02-25 PROCEDURE — 11000004 HC SNF PRIVATE

## 2023-02-25 PROCEDURE — 97116 GAIT TRAINING THERAPY: CPT | Mod: CQ

## 2023-02-25 PROCEDURE — 97530 THERAPEUTIC ACTIVITIES: CPT | Mod: CO

## 2023-02-25 PROCEDURE — 25000003 PHARM REV CODE 250: Performed by: HOSPITALIST

## 2023-02-25 PROCEDURE — 25000003 PHARM REV CODE 250: Performed by: NURSE PRACTITIONER

## 2023-02-25 PROCEDURE — 97110 THERAPEUTIC EXERCISES: CPT | Mod: CQ

## 2023-02-25 PROCEDURE — 97530 THERAPEUTIC ACTIVITIES: CPT | Mod: CQ

## 2023-02-25 RX ADMIN — LITHIUM CARBONATE 300 MG: 300 TABLET, FILM COATED, EXTENDED RELEASE ORAL at 08:02

## 2023-02-25 RX ADMIN — QUETIAPINE FUMARATE 100 MG: 25 TABLET ORAL at 05:02

## 2023-02-25 RX ADMIN — NITROFURANTOIN MONOHYDRATE/MACROCRYSTALS 100 MG: 25; 75 CAPSULE ORAL at 08:02

## 2023-02-25 RX ADMIN — PANTOPRAZOLE SODIUM 40 MG: 40 TABLET, DELAYED RELEASE ORAL at 09:02

## 2023-02-25 RX ADMIN — THERA TABS 1 TABLET: TAB at 09:02

## 2023-02-25 RX ADMIN — THIAMINE HCL TAB 100 MG 100 MG: 100 TAB at 09:02

## 2023-02-25 RX ADMIN — SODIUM BICARBONATE 1300 MG: 650 TABLET ORAL at 09:02

## 2023-02-25 RX ADMIN — NITROFURANTOIN MONOHYDRATE/MACROCRYSTALS 100 MG: 25; 75 CAPSULE ORAL at 09:02

## 2023-02-25 RX ADMIN — LEVETIRACETAM 1000 MG: 500 TABLET, FILM COATED ORAL at 09:02

## 2023-02-25 RX ADMIN — LACTULOSE 30 G: 20 SOLUTION ORAL at 09:02

## 2023-02-25 RX ADMIN — FOLIC ACID 1 MG: 1 TABLET ORAL at 09:02

## 2023-02-25 RX ADMIN — ARIPIPRAZOLE 10 MG: 5 TABLET ORAL at 08:02

## 2023-02-25 RX ADMIN — Medication 6 MG: at 08:02

## 2023-02-25 RX ADMIN — HYDROXYZINE HYDROCHLORIDE 50 MG: 25 TABLET, FILM COATED ORAL at 08:02

## 2023-02-25 RX ADMIN — PROPRANOLOL HYDROCHLORIDE 20 MG: 10 TABLET ORAL at 09:02

## 2023-02-25 RX ADMIN — LEVETIRACETAM 1000 MG: 500 TABLET, FILM COATED ORAL at 08:02

## 2023-02-25 RX ADMIN — SODIUM BICARBONATE 1300 MG: 650 TABLET ORAL at 08:02

## 2023-02-25 RX ADMIN — SPIRONOLACTONE 25 MG: 25 TABLET, FILM COATED ORAL at 09:02

## 2023-02-25 RX ADMIN — LACTULOSE 30 G: 20 SOLUTION ORAL at 08:02

## 2023-02-25 NOTE — PT/OT/SLP PROGRESS
Occupational Therapy   Treatment    Name: Marely Hamilton  MRN: [id number]  Admit Date: 2/14/2023  Admitting Diagnosis:  Hepatic encephalopathy    General Precautions: Standard, fall   Orthopedic Precautions: N/A   Braces: N/A    Recommendations:     Discharge Recommendations:  home health OT  Level of Assistance Recommended at Discharge: Intermittent assistance for ADL's and homemaking tasks  Discharge Equipment Recommendations:  (TBD)  Barriers to discharge:  Decreased caregiver support    Assessment:     Marely Hamilton is a 58 y.o. female with a medical diagnosis of Hepatic encephalopathy.  She presents with  Performance deficits affecting function are weakness, impaired endurance, impaired self care skills, impaired functional mobility, gait instability, impaired balance, decreased upper extremity function, decreased lower extremity function, decreased safety awareness, impaired cardiopulmonary response to activity.     Pt. Was cooperative and participated well with session on this day. Pt  continues to demonstrate levels of physical deficits with  functional indep with daily management activities tasks, selfcare skills with balance,  functional mobility, UB strength and endurance. Pt. Will continue to benefit from continued OT to progress towards goals      Rehab Potential is fair    Activity tolerance:  Fair    Plan:     Patient to be seen 6 x/week to address the above listed problems via self-care/home management, therapeutic activities, therapeutic exercises    Plan of Care Expires: 03/17/23  Plan of Care Reviewed with: patient    Subjective     Communicated with: PTA and Pt  prior to session. I am doing well today    Pain/Comfort:  Pain Rating 1:  (did not rate)  Location - Orientation 1: generalized  Location 1: coccyx  Pain Addressed 1: Pre-medicate for activity, Distraction    Patient's cultural, spiritual, Presybeterian conflicts given the current situation:  no    Objective:     Patient found up in chair in  gym with PTA present    upon OT entry to room.    Bed Mobility:    Patient completed Sit to Supine with stand by assistance     Functional Mobility/Transfers:  Patient completed Sit <> Stand Transfer with contact guard assistance  with  rolling walker   Patient completed Bed <> Chair Transfer using Stand Pivot technique with contact guard assistance with rolling walker    Activities of Daily Living:  Feeding:  modified independence with drinking water from a cup    AMPA 6 Click ADL: 21    OT Exercises: UE Ergometer 10 min    Treatment & Education:  Pt. With standing act on this day with task. Pt. With CGA/SBA for balance aspects with task with  AD at raised counter Pt with visual perception task with discrimination of various shapes and sizes x 7:11 min/sec with standing bal and min cues through out with weight shifting and use of BUE's incorporated and crossing mid line and facilitation with posture in prep for home management .     Pt. With 2# dowel activity with 2x15 reps with  shd flex, bicep curls and forward flex motion to increase BUE ROM and strength.    Pt. With therex performed to increase ROM, endurance selfcare task and fxl mobility for independence   Pt edu on role of OT, POC, safety when performing self care tasks , benefit of performing OOB activity, and safety when performing functional transfers and mobility management for preparation with goals to progress towards next level of care     Patient left supine with all lines intact and call button in reach    GOALS:   Multidisciplinary Problems       Occupational Therapy Goals          Problem: Occupational Therapy    Goal Priority Disciplines Outcome Interventions   Occupational Therapy Goal     OT, PT/OT Ongoing, Progressing    Description: Goals to be met by: 3/17/2023     Patient will increase functional independence with ADLs by performing:    UE Dressing with [place].--MET  LE Dressing with [place]. --Ongoing  Grooming while standing  at sink with Modified [place]. --Ongoing  Toileting from toilet with Modified [place] for hygiene and clothing management. --Ongoing  Bathing from  shower chair/bench with Stand-by Assistance. --Ongoing  Supine to sit with [place].  --MET  Step transfer with Modified [place] with RW. --Ongoing  Toilet transfer to toilet with Modified [place] with RW. --Ongoing                         Time Tracking:     OT Date of Treatment: 02/25/23  OT Start Time: 1050    OT Stop Time: 1128  OT Total Time (min): 38 min    Billable Minutes:Therapeutic Activity 38    2/25/2023  A client care conference was performed between the BALBIR and DREA, prior to treatment to discuss the patient's status, treatment plan and established goals.

## 2023-02-25 NOTE — PLAN OF CARE
CHW served per facility [facility] to patient. A copy was given to patient and faxed to [facility].

## 2023-02-25 NOTE — NURSING
"Notified by PTA that patient told them that "she wants to jump out the window." And the patient is close to being suicidal. Patient asked if she felt depressed. Patient replied "No, I take a lot of medicine to keep me from being depressed." Patient asked if she informed the PTA she wanted to jump out the window patient replied "no, you must have me confused with someone else". Patient asked if she felt suicidal patient replied "no".     "

## 2023-02-25 NOTE — PT/OT/SLP PROGRESS
"Physical Therapy Treatment    Patient Name:  Marely Hamilton   MRN:  [id number]  Admit Date: 2/14/2023  Admitting Diagnosis: Hepatic encephalopathy  Recent Surgeries:     General Precautions: Standard, fall  Orthopedic Precautions: N/A  Braces: N/A    Recommendations:     Discharge Recommendations: home with home health  Level of Assistance Recommended at Discharge: Intermittent assistance   Discharge Equipment Recommendations: shower chair  Barriers to discharge: Decreased caregiver support    Assessment:     Marely Hamilton is a 58 y.o. female admitted with a medical diagnosis of Hepatic encephalopathy . Pt tolerated well, pt would continue to benefit from skilled PT services to improve overall functional mobility, strength and endurance.  .      Performance deficits affecting function: weakness, impaired endurance, impaired self care skills, impaired functional mobility, gait instability, impaired balance, decreased upper extremity function, decreased lower extremity function, decreased safety awareness, impaired cardiopulmonary response to activity.    Rehab Potential is good    Activity Tolerance: Fair    Plan:     Patient to be seen 6 x/week to address the above listed problems via gait training, therapeutic activities, therapeutic exercises, neuromuscular re-education, wheelchair management/training    Plan of Care Expires: 03/17/23  Plan of Care Reviewed with: patient    Subjective     "Feel ok, my legs are weak" pt agreeable to therapy    Pain/Comfort:  Pain Rating 1:  (did not rate" sore")  Location - Orientation 1: generalized  Location 1: coccyx  Pain Addressed 1: Reposition, Distraction  Pain Rating Post-Intervention 1:  (no further mention)    Patient's cultural, spiritual, Pentecostalism conflicts given the current situation:  no    Objective:      Patient found with  (in bed) upon PT entry to room.     Therapeutic Activities and Exercises: recumbent cross  x 3 min L-2 (appeared nervous), pt preferred " mini elliptical x 7 minutes light resistance    Functional Mobility:  Bed Mobility:     Supine to Sit: modified independence and supervision  Transfers:     Sit to Stand:  supervision with rolling walker and occ vcs to ensure brakes  Bed to Chair: stand by assistance with  no AD  using  Stand Pivot   Gait: amb with RW CGA ~ 208 ft slightly unsteady but no LOB, amb with CG/HHA ~ 80 ft with 2 turns   Balance: dyn standing bal act with RW uni lat support CG/close SBA using BUE ~ 4:08 minutes  Stairs:  asc/cris 4 step with BHR SBA vcs for safety/tech    AM-PAC 6 CLICK MOBILITY  19    Patient left up in chair with  with OT in gym .    GOALS:   Multidisciplinary Problems       Physical Therapy Goals          Problem: Physical Therapy    Goal Priority Disciplines Outcome Goal Variances Interventions   Physical Therapy Goal     PT, PT/OT Ongoing, Progressing     Description: Goals to be met by: 3/17/23     Patient will increase functional independence with mobility by performing:    . Supine to sit with [place]  . Sit to supine with [place]  . Rolling to Left and Right with [place].  . Sit to stand transfer with Supervision  . Bed to chair transfer with Supervision using No Assistive Device  . Gait  x 150 feet with Supervision using Rolling Walker.   . Wheelchair propulsion x100 feet with Contact Guard Assistance using bilateral uppper extremities  . Ascend/descend 4 stair with bilateral Handrails Contact Guard Assistance using No Assistive Device.   . Ascend/Descend 4 inch curb step with Contact Guard Assistance using Rolling Walker.                         Time Tracking:     PT Received On: 02/25/23  PT Start Time: 0950  PT Stop Time: 1031  PT Total Time (min): 41 min    Billable Minutes: Gait Training 12, Therapeutic Activity 19, and Therapeutic Exercise 10    Treatment Type: Treatment  PT/PTA: PTA     PTA Visit Number: 4     02/25/2023

## 2023-02-25 NOTE — PLAN OF CARE
Problem: Adult Inpatient Plan of Care  Goal: Plan of Care Review  Outcome: Ongoing, Progressing  Flowsheets (Taken 2/25/2023 1254)  Plan of Care Reviewed With: patient     Problem: Skin Injury Risk Increased  Goal: Skin Health and Integrity  Outcome: Ongoing, Progressing     Problem: Impaired Wound Healing  Goal: Optimal Wound Healing  Outcome: Ongoing, Progressing     Problem: Malnutrition  Goal: Improved Nutritional Intake  Outcome: Ongoing, Progressing     Problem: Fall Injury Risk  Goal: Absence of Fall and Fall-Related Injury  Outcome: Ongoing, Progressing

## 2023-02-25 NOTE — PLAN OF CARE
Problem: Occupational Therapy  Goal: Occupational Therapy Goal  Description: Goals to be met by: 3/17/2023     Patient will increase functional independence with ADLs by performing:    UE Dressing with [place].--MET  LE Dressing with [place]. --Ongoing  Grooming while standing at sink with Modified [place]. --Ongoing  Toileting from toilet with Modified [place] for hygiene and clothing management. --Ongoing  Bathing from  shower chair/bench with Stand-by Assistance. --Ongoing  Supine to sit with [place].  --MET  Step transfer with Modified [place] with RW. --Ongoing  Toilet transfer to toilet with Modified [place] with RW. --Ongoing    Outcome: Ongoing, Progressing

## 2023-02-25 NOTE — NURSING
Scheduled medication explained. Ms Hamilton refused 30 mg of lactulose. Pt had only had one BM today. Will continue to monitor. Call light is within reach.

## 2023-02-26 PROCEDURE — 25000003 PHARM REV CODE 250: Performed by: HOSPITALIST

## 2023-02-26 PROCEDURE — 25000003 PHARM REV CODE 250: Performed by: NURSE PRACTITIONER

## 2023-02-26 PROCEDURE — 11000004 HC SNF PRIVATE

## 2023-02-26 RX ADMIN — SPIRONOLACTONE 25 MG: 25 TABLET, FILM COATED ORAL at 09:02

## 2023-02-26 RX ADMIN — PANTOPRAZOLE SODIUM 40 MG: 40 TABLET, DELAYED RELEASE ORAL at 09:02

## 2023-02-26 RX ADMIN — LITHIUM CARBONATE 300 MG: 300 TABLET, FILM COATED, EXTENDED RELEASE ORAL at 08:02

## 2023-02-26 RX ADMIN — LEVETIRACETAM 1000 MG: 500 TABLET, FILM COATED ORAL at 09:02

## 2023-02-26 RX ADMIN — SODIUM BICARBONATE 1300 MG: 650 TABLET ORAL at 09:02

## 2023-02-26 RX ADMIN — SODIUM BICARBONATE 1300 MG: 650 TABLET ORAL at 08:02

## 2023-02-26 RX ADMIN — LEVETIRACETAM 1000 MG: 500 TABLET, FILM COATED ORAL at 08:02

## 2023-02-26 RX ADMIN — LACTULOSE 30 G: 20 SOLUTION ORAL at 09:02

## 2023-02-26 RX ADMIN — NITROFURANTOIN MONOHYDRATE/MACROCRYSTALS 100 MG: 25; 75 CAPSULE ORAL at 08:02

## 2023-02-26 RX ADMIN — THIAMINE HCL TAB 100 MG 100 MG: 100 TAB at 09:02

## 2023-02-26 RX ADMIN — ARIPIPRAZOLE 10 MG: 5 TABLET ORAL at 08:02

## 2023-02-26 RX ADMIN — THERA TABS 1 TABLET: TAB at 09:02

## 2023-02-26 RX ADMIN — LACTULOSE 30 G: 20 SOLUTION ORAL at 02:02

## 2023-02-26 RX ADMIN — HYDROXYZINE HYDROCHLORIDE 50 MG: 25 TABLET, FILM COATED ORAL at 08:02

## 2023-02-26 RX ADMIN — Medication 6 MG: at 08:02

## 2023-02-26 RX ADMIN — QUETIAPINE FUMARATE 100 MG: 25 TABLET ORAL at 05:02

## 2023-02-26 RX ADMIN — FOLIC ACID 1 MG: 1 TABLET ORAL at 09:02

## 2023-02-26 RX ADMIN — NITROFURANTOIN MONOHYDRATE/MACROCRYSTALS 100 MG: 25; 75 CAPSULE ORAL at 09:02

## 2023-02-26 RX ADMIN — LACTULOSE 30 G: 20 SOLUTION ORAL at 08:02

## 2023-02-26 NOTE — PLAN OF CARE
Problem: Adult Inpatient Plan of Care  Goal: Plan of Care Review  Outcome: Ongoing, Progressing  Goal: Patient-Specific Goal (Individualized)  Outcome: Ongoing, Progressing     Problem: Skin Injury Risk Increased  Goal: Skin Health and Integrity  Outcome: Ongoing, Progressing     Problem: Impaired Wound Healing  Goal: Optimal Wound Healing  Outcome: Ongoing, Progressing     Problem: Malnutrition  Goal: Improved Nutritional Intake  Outcome: Ongoing, Progressing     Problem: Fall Injury Risk  Goal: Absence of Fall and Fall-Related Injury  Outcome: Ongoing, Progressing

## 2023-02-26 NOTE — PLAN OF CARE
Problem: Adult Inpatient Plan of Care  Goal: Plan of Care Review  Outcome: Ongoing, Progressing  Flowsheets (Taken 2/26/2023 2037)  Plan of Care Reviewed With: patient     Problem: Skin Injury Risk Increased  Goal: Skin Health and Integrity  Outcome: Ongoing, Progressing     Problem: Impaired Wound Healing  Goal: Optimal Wound Healing  Outcome: Ongoing, Progressing     Problem: Malnutrition  Goal: Improved Nutritional Intake  Outcome: Ongoing, Progressing     Problem: Fall Injury Risk  Goal: Absence of Fall and Fall-Related Injury  Outcome: Ongoing, Progressing

## 2023-02-27 LAB
ALBUMIN SERPL BCP-MCNC: 2.2 G/DL (ref 3.5–5.2)
ALP SERPL-CCNC: 118 U/L (ref 55–135)
ALT SERPL W/O P-5'-P-CCNC: 18 U/L (ref 10–44)
AMMONIA PLAS-SCNC: 41 UMOL/L (ref 10–50)
ANION GAP SERPL CALC-SCNC: 5 MMOL/L (ref 8–16)
ANISOCYTOSIS BLD QL SMEAR: SLIGHT
AST SERPL-CCNC: 38 U/L (ref 10–40)
BASOPHILS # BLD AUTO: 0.01 K/UL (ref 0–0.2)
BASOPHILS NFR BLD: 0.7 % (ref 0–1.9)
BILIRUB SERPL-MCNC: 2 MG/DL (ref 0.1–1)
BUN SERPL-MCNC: 6 MG/DL (ref 6–20)
BURR CELLS BLD QL SMEAR: ABNORMAL
CALCIUM SERPL-MCNC: 8.3 MG/DL (ref 8.7–10.5)
CHLORIDE SERPL-SCNC: 113 MMOL/L (ref 95–110)
CO2 SERPL-SCNC: 20 MMOL/L (ref 23–29)
CREAT SERPL-MCNC: 0.7 MG/DL (ref 0.5–1.4)
DIFFERENTIAL METHOD: ABNORMAL
EOSINOPHIL # BLD AUTO: 0 K/UL (ref 0–0.5)
EOSINOPHIL NFR BLD: 2.9 % (ref 0–8)
ERYTHROCYTE [DISTWIDTH] IN BLOOD BY AUTOMATED COUNT: 15.5 % (ref 11.5–14.5)
EST. GFR  (NO RACE VARIABLE): >60 ML/MIN/1.73 M^2
GLUCOSE SERPL-MCNC: 85 MG/DL (ref 70–110)
HCT VFR BLD AUTO: 27.5 % (ref 37–48.5)
HGB BLD-MCNC: 8.3 G/DL (ref 12–16)
IMM GRANULOCYTES # BLD AUTO: 0 K/UL (ref 0–0.04)
IMM GRANULOCYTES NFR BLD AUTO: 0 % (ref 0–0.5)
LYMPHOCYTES # BLD AUTO: 0.6 K/UL (ref 1–4.8)
LYMPHOCYTES NFR BLD: 42 % (ref 18–48)
MAGNESIUM SERPL-MCNC: 1.6 MG/DL (ref 1.6–2.6)
MCH RBC QN AUTO: 33.1 PG (ref 27–31)
MCHC RBC AUTO-ENTMCNC: 30.2 G/DL (ref 32–36)
MCV RBC AUTO: 110 FL (ref 82–98)
MONOCYTES # BLD AUTO: 0.1 K/UL (ref 0.3–1)
MONOCYTES NFR BLD: 10.1 % (ref 4–15)
NEUTROPHILS # BLD AUTO: 0.6 K/UL (ref 1.8–7.7)
NEUTROPHILS NFR BLD: 44.3 % (ref 38–73)
NRBC BLD-RTO: 0 /100 WBC
PHOSPHATE SERPL-MCNC: 3.1 MG/DL (ref 2.7–4.5)
PLATELET # BLD AUTO: 48 K/UL (ref 150–450)
PLATELET BLD QL SMEAR: ABNORMAL
PMV BLD AUTO: 10.9 FL (ref 9.2–12.9)
POIKILOCYTOSIS BLD QL SMEAR: SLIGHT
POTASSIUM SERPL-SCNC: 4.3 MMOL/L (ref 3.5–5.1)
PROT SERPL-MCNC: 4.5 G/DL (ref 6–8.4)
RBC # BLD AUTO: 2.51 M/UL (ref 4–5.4)
SODIUM SERPL-SCNC: 138 MMOL/L (ref 136–145)
WBC # BLD AUTO: 1.38 K/UL (ref 3.9–12.7)

## 2023-02-27 PROCEDURE — 97530 THERAPEUTIC ACTIVITIES: CPT

## 2023-02-27 PROCEDURE — 11000004 HC SNF PRIVATE

## 2023-02-27 PROCEDURE — 36415 COLL VENOUS BLD VENIPUNCTURE: CPT | Performed by: HOSPITALIST

## 2023-02-27 PROCEDURE — 25000003 PHARM REV CODE 250: Performed by: NURSE PRACTITIONER

## 2023-02-27 PROCEDURE — 83735 ASSAY OF MAGNESIUM: CPT | Performed by: HOSPITALIST

## 2023-02-27 PROCEDURE — 97110 THERAPEUTIC EXERCISES: CPT

## 2023-02-27 PROCEDURE — 85025 COMPLETE CBC W/AUTO DIFF WBC: CPT | Performed by: HOSPITALIST

## 2023-02-27 PROCEDURE — 82140 ASSAY OF AMMONIA: CPT | Performed by: NURSE PRACTITIONER

## 2023-02-27 PROCEDURE — 97116 GAIT TRAINING THERAPY: CPT

## 2023-02-27 PROCEDURE — 25000003 PHARM REV CODE 250: Performed by: HOSPITALIST

## 2023-02-27 PROCEDURE — 84100 ASSAY OF PHOSPHORUS: CPT | Performed by: HOSPITALIST

## 2023-02-27 PROCEDURE — 80053 COMPREHEN METABOLIC PANEL: CPT | Performed by: NURSE PRACTITIONER

## 2023-02-27 RX ORDER — FUROSEMIDE 20 MG/1
10 TABLET ORAL 2 TIMES DAILY
Qty: 30 TABLET | Refills: 1
Start: 2023-02-27 | End: 2023-05-05

## 2023-02-27 RX ORDER — TALC
6 POWDER (GRAM) TOPICAL NIGHTLY PRN
Refills: 0 | Status: CANCELLED | COMMUNITY
Start: 2023-02-27

## 2023-02-27 RX ADMIN — LACTULOSE 30 G: 20 SOLUTION ORAL at 09:02

## 2023-02-27 RX ADMIN — SPIRONOLACTONE 25 MG: 25 TABLET, FILM COATED ORAL at 09:02

## 2023-02-27 RX ADMIN — THERA TABS 1 TABLET: TAB at 09:02

## 2023-02-27 RX ADMIN — LEVETIRACETAM 1000 MG: 500 TABLET, FILM COATED ORAL at 08:02

## 2023-02-27 RX ADMIN — PANTOPRAZOLE SODIUM 40 MG: 40 TABLET, DELAYED RELEASE ORAL at 09:02

## 2023-02-27 RX ADMIN — QUETIAPINE FUMARATE 100 MG: 25 TABLET ORAL at 05:02

## 2023-02-27 RX ADMIN — LACTULOSE 30 G: 20 SOLUTION ORAL at 03:02

## 2023-02-27 RX ADMIN — HYDROXYZINE HYDROCHLORIDE 50 MG: 25 TABLET, FILM COATED ORAL at 08:02

## 2023-02-27 RX ADMIN — THIAMINE HCL TAB 100 MG 100 MG: 100 TAB at 09:02

## 2023-02-27 RX ADMIN — LACTULOSE 30 G: 20 SOLUTION ORAL at 08:02

## 2023-02-27 RX ADMIN — FOLIC ACID 1 MG: 1 TABLET ORAL at 09:02

## 2023-02-27 RX ADMIN — SODIUM BICARBONATE 1300 MG: 650 TABLET ORAL at 09:02

## 2023-02-27 RX ADMIN — Medication 6 MG: at 08:02

## 2023-02-27 RX ADMIN — LEVETIRACETAM 1000 MG: 500 TABLET, FILM COATED ORAL at 09:02

## 2023-02-27 RX ADMIN — LITHIUM CARBONATE 300 MG: 300 TABLET, FILM COATED, EXTENDED RELEASE ORAL at 08:02

## 2023-02-27 RX ADMIN — ARIPIPRAZOLE 10 MG: 5 TABLET ORAL at 08:02

## 2023-02-27 RX ADMIN — PROPRANOLOL HYDROCHLORIDE 20 MG: 10 TABLET ORAL at 09:02

## 2023-02-27 RX ADMIN — SODIUM BICARBONATE 1300 MG: 650 TABLET ORAL at 08:02

## 2023-02-27 NOTE — PROGRESS NOTES
[facility]                                  Skilled Nursing Facility                   Progress Note     Admit Date: 2/14/2023  RBAYAN 2/28/2023  Principal Problem:  Hepatic encephalopathy   HPI obtained from patient interview and chart review     Chief Complaint: Re-evaluation of medical treatment and therapy status:  Lab review, re-evaluation of swollen ankles    HPI:   Ms. Hamilton is a 57 year old female PMHx of alcoholic cirrhosis, hepatic encephalopathy, seizure, bipolar disorder who presents to SNF following hospitalization for hepatic encephalopathy with UTI.  Admission to SNF for secondary weakness and debility.    Interval history: All of today's labs reviewed and are listed below.  Patient with persistently low bicarb at 20, being supplemented, T bili has improved to 2.0, AST and ALT are stable, repeat ammonia is now within normal limits at 41, previously elevated at 61, leukopenia persists with WBC of 1.38, possibly due to her antipsychotic medications.  H&H is stable, thrombocytopenia continues.  24 hr vital sign ranges listed below.  Patient's bilateral ankle swelling has resolved.  Patient denies shortness of breath, abdominal discomfort, nausea, or vomiting.  Patient reports an adequate appetite.  Patient denies dysuria.  Patient reports having regular bowel movements.  Patient progessing with PT/OT. Continuing to follow and treat all acute and chronic conditions.        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    Past Surgical History: Patient has a past surgical history that includes Esophagogastroduodenoscopy and Cosmetic surgery.    Social History: Patient reports that she has never smoked. She has never used smokeless tobacco. She reports that she does not currently use alcohol. She reports that she does not use drugs.    Family History: family history includes Alcohol abuse in her father, paternal grandfather, and sister; Bipolar disorder in her father; Hypertension in her mother; Suicide in her father.    Allergies: Patient is allergic to sulfa (sulfonamide antibiotics) and codeine.    ROS  Constitutional: Negative for fever   Eyes: Negative for blurred vision, double vision   Respiratory: Negative for cough, shortness of breath   Cardiovascular: Negative for chest pain, palpitations, and leg swelling.   Gastrointestinal: Negative for abdominal pain, constipation, diarrhea, nausea, vomiting.   Genitourinary: Negative for dysuria, frequency   Musculoskeletal:  + generalized weakness. Negative for back pain and myalgias.    Skin: Negative for itching and rash.   Neurological: Negative for dizziness, headaches.   Psychiatric/Behavioral: +  for depression. The patient is not nervous/anxious.      24 hour Vital Sign Range   Temp:  [97.9 °F (36.6 °C)-98.1 °F (36.7 °C)]   Pulse:  [75-80]   Resp:  [16-18]   BP: (96-98)/(57-74)   SpO2:  [95 %-97 %]     Current BMI: Body mass index is 18.39 kg/m².    PEx  Constitutional: Patient appears debilitated.  No distress noted  HENT:   Head: Normocephalic and atraumatic.   Eyes:  Pupils are equal, round  Neck: Normal range of motion. Neck supple.   Cardiovascular: Normal rate, regular rhythm and normal heart sounds.    Pulmonary/Chest: Effort normal and breath sounds are clear  Abdominal: Soft. Bowel sounds are normal.   Musculoskeletal: Normal range of motion.   Neurological: Alert and oriented to person, place, and time.   Psychiatric: Normal mood and affect. Behavior is normal.   Skin: Skin is warm and dry. Scattered areas of ecchymosis noted, Blanchable redness to sacrum area.     Recent Labs   Lab 02/27/23  0442      K 4.3   *   CO2 20*   BUN 6   CREATININE 0.7   MG 1.6         Recent Labs   Lab 02/27/23  0442   WBC 1.38*   RBC 2.51*   HGB 8.3*   HCT 27.5*   PLT 48*   *   MCH 33.1*   MCHC 30.2*           Assessment and Plan:    Cirrhosis, Laennec's  Hepatic encephalopathy (resolved)  - continue lactulose 30g TID with goal of 3-4 bowel movements daily  - Holding home Lasix given concern for dehydration  - Continue spironolactone 25 mg daily  - continue propanolol  - Low-sodium diet, fluid restriction 1500 mL  - Nutrition consulted  - Mentation back to baseline  - PT/OT  - 2/16 ordered ammonia level, 61 from 68  - 2/23 LFTs stable; ambulatory referral placed for hepatology, patient has been lost to follow-up with hepatology for the last few years, patient's sister states they are having trouble finding a provider that they like, patient's PCP has been refilling her lactulose, message sent to  to set patient up with appointment  - 2/24 per hepatology office, 1st available will be in April, they will not schedule patient's appointment until patient is discharged from facility  - 2/27 stable, ammonia now within normal range, discussed with patient that her follow-up appointment with hepatology will be made after she is discharged and we will call her with the appointment time and date    Ankle swelling  - improved, bilateral, compressive wraps applied 2/23,  continue spironolactone  - resolved    UTI (urinary tract infection)  - 2/9  UA + UC pending. stared on IV ceftriaxone .   - 2/10 . UC - GRAM NEGATIVE BILLY >100,000 cfu/ml Identification and susceptibility pending .ENTEROCOCCUS SPECIES > 100,000 cfu/ml dentification and susceptibility pending. started on amoxicillin  - 2/12 E coli sensitive to ceftriaxone and Enterococcus sensitive to amoxicillin  - complete 2 more doses of amoxicillin at SNF to complete UTI treatment  - 2/20 patient with E coli UTI again, now resistant to amoxicillin, sensitive to Macrobid will treat with that- 100 mg BID x7 days  - resolved    Metabolic acidosis  - continue sodium bicarbonate 1300 mg BID    Bipolar 1 disorder, depressed, severe  - Continue home lithium 3 mg qHS., Abilify 10 mg qHS, Seroquel 100 mg qHS.     Malnutrition  - RD consulted  - continue multivitamin     Anemia  - macrocytic anemia.  - Hemoglobin stable.  - Etiology likely due to chronic disease  - continue folic acid, thiamine  - continue monitor twice weekly CBCs, transfuse for hemoglobin < 7    History of seizure  - Continue home Keppra 1000 mg BID      Thrombocytopenia  - Secondary to cirrhosis   - platelet counts low but stable  - continue monitor twice weekly CBC    GERD  - continue Protonix 40 mg daily    Debility   - Continue with PT/OT for gait training and strengthening and restoration of ADL's   - Encourage mobility, OOB in chair, and early ambulation as appropriate  - Fall precautions   - Monitor for bowel and bladder dysfunction  - Monitor for and prevent skin breakdown and pressure ulcers  - Continue DVT prophylaxis with frequent ambulation, chemical DVT prophylaxis not indicated in this patient with coagulopathies      Anticipate disposition:  Home with home health      Follow-up needed during SNF stay-    Follow-up needed after discharge from SNF: PCP, hepatology    No future appointments.      Robina Esquivel, NP  Department of Hospital Medicine   [facility]     DOS: 2/27/2023       Patient note was created using MModal Dictation.  Any errors in syntax or even information may not have been identified and edited on initial review prior to signing this note.

## 2023-02-27 NOTE — PT/OT/SLP PROGRESS
Physical Therapy Treatment and Discharge Summary    Patient Name:  Marely Hamilton   MRN:  [id number]  Admit Date: 2/14/2023  Admitting Diagnosis: Hepatic encephalopathy  General Precautions: Standard, fall  Orthopedic Precautions: N/A  Braces: N/A    Recommendations:     Discharge Recommendations: home with home health  Level of Assistance Recommended at Discharge: Intermittent assistance   Discharge Equipment Recommendations: shower chair  Barriers to discharge: Decreased caregiver support    Assessment:     Marely Hamilton is a 58 y.o. female admitted with a medical diagnosis of Hepatic encephalopathy . Pt appropriate for d/c home tomorrow with continued HHPT and intermittent assistance especially for GT and stair negotiation.      Performance deficits affecting function: weakness, impaired endurance, impaired self care skills, impaired functional mobility, gait instability, impaired balance, decreased upper extremity function, decreased lower extremity function, decreased safety awareness, impaired cardiopulmonary response to activity.    Rehab Potential is good    Activity Tolerance: Good    Plan:     Patient to be seen 6 x/week to address the above listed problems via gait training, therapeutic activities, therapeutic exercises, neuromuscular re-education, wheelchair management/training    Plan of Care Expires: 03/17/23  Plan of Care Reviewed with: patient    Subjective     Pt. Agreeable to work with PT and looking forward to being dc home.     Pain/Comfort:  Pain Rating 1: 0/10  Pain Rating Post-Intervention 1: 0/10    Patient's cultural, spiritual, Oriental orthodox conflicts given the current situation:  no    Objective:     Communicated with pt's nurse prior to session.  Patient found HOB elevated with   upon PT entry to room.     Therapeutic Activities and Exercises: Nustep with resistance at 4 to help improve pt's overall MMT and cardiovascular endurance. Pt did not need any rest breaks.    Functional  "Mobility:  Bed Mobility:     Supine to Sit: modified independence  Transfers:     Sit to Stand:  supervision with no AD and rolling walker  Bed to Chair: supervision with  no AD  using  Stand Pivot  Gait: ~220ft +120ft with HHA and CGA for safe balance as pt with lateral instability and decrease B push off. ~110ft with no Ad and [place] 2* increase lateral instability  Stairs:  Pt ascended/descended 12 stair(s) and 4" curb step with Rolling Walker with bilateral handrails with Contact Guard Assistance.   Wheelchair Propulsion:  Pt propelled Standard wheelchair x ~100 feet on Level tile with  Bilateral upper extremity with Minimal Assistance.     AM-PAC 6 CLICK MOBILITY  20    Patient left up in chair with call button in reach and [place] telesitter contacted to notify of pt's return to her room .    GOALS:   Multidisciplinary Problems       Physical Therapy Goals          Problem: Physical Therapy    Goal Priority Disciplines Outcome Goal Variances Interventions   Physical Therapy Goal     PT, PT/OT Ongoing, Progressing     Description: Goals to be met by: 3/17/23     Patient will increase functional independence with mobility by performing:    . Supine to sit with [place]-met  . Sit to supine with [place]-met  . Rolling to Left and Right with [place].-met  . Sit to stand transfer with Supervision-met  . Bed to chair transfer with Supervision using No Assistive Device-met  . Gait  x 150 feet with Supervision using Rolling Walker.-not met   . Wheelchair propulsion x100 feet with Contact Guard Assistance using bilateral uppper extremities-not met  . Ascend/descend 4 stair with bilateral Handrails Contact Guard Assistance using No Assistive Device. -met  . Ascend/Descend 4 inch curb step with Contact Guard Assistance using Rolling Walker.-met                         Time Tracking:     PT Received On: 02/27/23  PT Start Time: 0915  PT Stop Time: 1000  PT Total Time (min): 45 min    Billable Minutes: Gait " Training 20, Therapeutic Activity 10, and Therapeutic Exercise 15    Treatment Type: Treatment  PT/PTA: PT     PTA Visit Number: 0     02/27/2023

## 2023-02-27 NOTE — PLAN OF CARE
Patient is set to discharge on 02/28/23 between 11:30am-12:00pm. Patient discharging to home and will be picked up [facility]. Patient set up with [facility]. DME provided: the patient declined the recommended shower chair; she stated she have a shower chair at home.

## 2023-02-27 NOTE — PLAN OF CARE
Problem: Physical Therapy  Goal: Physical Therapy Goal  Description: Goals to be met by: 3/17/23     Patient will increase functional independence with mobility by performing:    . Supine to sit with [place]-met  . Sit to supine with [place]-met  . Rolling to Left and Right with [place].-met  . Sit to stand transfer with [place]-met  . Bed to chair transfer with [place] using No Assistive Device-met  . Gait  x 150 feet with Supervision using Rolling Walker.-not met   . Wheelchair propulsion x100 feet with Contact Guard Assistance using bilateral uppper extremities-not met  . Ascend/descend 4 stair with bilateral Handrails Contact Guard Assistance using No Assistive Device. -met  . Ascend/Descend 4 inch curb step with Contact Guard Assistance using Rolling Walker.-met    Outcome: Ongoing, Progressing

## 2023-02-27 NOTE — PLAN OF CARE
Problem: Adult Inpatient Plan of Care  Goal: Plan of Care Review  Outcome: Ongoing, Progressing  Goal: Patient-Specific Goal (Individualized)  Outcome: Ongoing, Progressing  Goal: Absence of Hospital-Acquired Illness or Injury  Outcome: Ongoing, Progressing  Goal: Optimal Comfort and Wellbeing  Outcome: Ongoing, Progressing  Goal: Readiness for Transition of Care  Outcome: Ongoing, Progressing     Problem: Skin Injury Risk Increased  Goal: Skin Health and Integrity  Outcome: Ongoing, Progressing     Problem: Impaired Wound Healing  Goal: Optimal Wound Healing  Outcome: Ongoing, Progressing     Problem: Malnutrition  Goal: Improved Nutritional Intake  Outcome: Ongoing, Progressing     Problem: Fall Injury Risk  Goal: Absence of Fall and Fall-Related Injury  Outcome: Ongoing, Progressing

## 2023-02-27 NOTE — PT/OT/SLP PROGRESS
Occupational Therapy   Treatment    Name: Marely Hamilton  MRN: [id number]  Admit Date: 2/14/2023  Admitting Diagnosis:  Hepatic encephalopathy    General Precautions: Standard, fall   Orthopedic Precautions: N/A   Braces: N/A    Recommendations:     Discharge Recommendations:  home health OT  Level of Assistance Recommended at Discharge: Intermittent assistance for ADL's and homemaking tasks  Discharge Equipment Recommendations:  (TBD)  Barriers to discharge:  Decreased caregiver support    Assessment:     Marely Hamilton is a 58 y.o. female with a medical diagnosis of Hepatic encephalopathy .  She currently limited in the safe performance of self-care , functional mobility and ADLs and currently not performing tasks at PLOF  . Currently presenting with performance deficits including  weakness, impaired endurance, impaired self care skills, impaired functional mobility, gait instability, impaired balance and  decreased safety awareness.   Pt tolerated Tx without incident and is making progress but continues to require SBA to (S)  to perform self care tasks, functional mobility and functional transfers .  She would continue to benefit from OT intervention to further her functional (I)ce and safety.     Rehab Potential is good    Activity tolerance:  Good    Plan:     Patient to be seen 6 x/week to address the above listed problems via self-care/home management, therapeutic activities, therapeutic exercises    Plan of Care Expires: 03/17/23  Plan of Care Reviewed with: patient    Subjective     Communicated with: nurse prior to session.     Pain/Comfort:  Pain Rating 1: 0/10  Pain Rating Post-Intervention 1: 0/10    Patient's cultural, spiritual, Mosque conflicts given the current situation:  no    Objective:     Patient found up in chair with  (no active lines) upon OT entry to room.    Bed Mobility:    Pt seated in bedside chair at onset of therapy session.      Functional Mobility/Transfers:  Patient completed  Sit <> Stand Transfer with supervision  with  rolling walker   Patient completed Bed <> Chair Transfer using Step Transfer technique with supervision with rolling walker  Functional Mobility: Pt ambulated with RW from bedside chair to therapy gym  a distance of 145 ft and later ambulated back to her room 140 ft  with (S) only.      Pt worked on functional standing activity consisting of standing with RW while reaching in all planes , crossing of midline and reaching to varying heights to facilitate (B) wt shifting and stability in standing in prep for performance of self care tasks and functional ADLS in standing.  Pt tolerated up to 14 Min. in standing with (S) and RW to steady.     [facility] 6 Click ADL: 21    OT Exercises: Pt performed UBE exercise for 12 minutes with Min  resistance.  UE exercises performed to increase functional endurance and strength in order increase independence when performing self care tasks, functional ambulation, W/C propulsion, and functional standing activities .     Treatment & Education:  Pt edu on safety when performing functional standing activities , and safety when performing functional transfers and mobility.  - White board updated    Patient left up in chair with call button in reach    GOALS:   Multidisciplinary Problems       Occupational Therapy Goals          Problem: Occupational Therapy    Goal Priority Disciplines Outcome Interventions   Occupational Therapy Goal     OT, PT/OT Ongoing, Progressing    Description: Goals to be met by: 3/17/2023     Patient will increase functional independence with ADLs by performing:    UE Dressing with [place].--MET  LE Dressing with [place]. --Ongoing  Grooming while standing at sink with Modified [place]. --Ongoing  Toileting from toilet with Modified [place] for hygiene and clothing management. --Ongoing  Bathing from  shower chair/bench with Stand-by Assistance. --Ongoing  Supine to sit with [place].  --MET  Step  transfer with Modified [place] with RW. --Ongoing  Toilet transfer to toilet with Modified [place] with RW. --Ongoing                         Time Tracking:     OT Date of Treatment: 02/27/23  OT Start Time: 0755    OT Stop Time: 0835  OT Total Time (min): 40 min    Billable Minutes:Therapeutic Activity 28  Therapeutic Exercise 12    2/27/2023

## 2023-02-28 VITALS
HEART RATE: 73 BPM | BODY MASS INDEX: 18.39 KG/M2 | OXYGEN SATURATION: 96 % | HEIGHT: 63 IN | TEMPERATURE: 98 F | DIASTOLIC BLOOD PRESSURE: 55 MMHG | RESPIRATION RATE: 18 BRPM | WEIGHT: 103.81 LBS | SYSTOLIC BLOOD PRESSURE: 109 MMHG

## 2023-02-28 PROCEDURE — 25000003 PHARM REV CODE 250: Performed by: NURSE PRACTITIONER

## 2023-02-28 PROCEDURE — 97535 SELF CARE MNGMENT TRAINING: CPT | Mod: CO

## 2023-02-28 PROCEDURE — 25000003 PHARM REV CODE 250: Performed by: HOSPITALIST

## 2023-02-28 RX ADMIN — SODIUM BICARBONATE 1300 MG: 650 TABLET ORAL at 08:02

## 2023-02-28 RX ADMIN — FOLIC ACID 1 MG: 1 TABLET ORAL at 08:02

## 2023-02-28 RX ADMIN — LEVETIRACETAM 1000 MG: 500 TABLET, FILM COATED ORAL at 08:02

## 2023-02-28 RX ADMIN — THERA TABS 1 TABLET: TAB at 08:02

## 2023-02-28 RX ADMIN — PANTOPRAZOLE SODIUM 40 MG: 40 TABLET, DELAYED RELEASE ORAL at 08:02

## 2023-02-28 RX ADMIN — SPIRONOLACTONE 25 MG: 25 TABLET, FILM COATED ORAL at 08:02

## 2023-02-28 RX ADMIN — THIAMINE HCL TAB 100 MG 100 MG: 100 TAB at 08:02

## 2023-02-28 NOTE — DISCHARGE SUMMARY
"[facility]  Discharge Summary      Patient Name: Marely Hamilton  MRN: [id number]  BUTCH: [id number]  Patient Class: IP- SNF  Admission Date: 2/14/2023  Hospital Length of Stay: 14 days  Discharge Date and Time: 2/28/2023 11:56 AM  Attending Physician: No att. providers found   Discharging Provider: Robina Esquivel NP  Primary Care Provider: Viktor Ross MD    Primary Care Team: LTAC 8 ROBINA ESQUIVEL     HPI:   Ms. Hamilton is a 57 year old female PMHx of alcoholic cirrhosis, hepatic encephalopathy, seizure, bipolar disorder who presents to SNF following hospitalization for hepatic encephalopathy with UTI.  Admission to SNF for secondary weakness and debility.      patient originally presented to [facility] ED on 02/07 with altered mental status.  Per [facility], History taken from chart review given patient only able to state that she is confused.  Per report, EMS was called by patient's sister given concern for worsening mental status.  Sister also states patient has become less able take care of herself and manage her medications.  Believes patient has not taking her lactulose in over a week.  In the ED, VSS.  Labs notable for creatinine 0.4, hemoglobin 12.5, bilirubin 3.1, platelets 59, INR 1.6, ammonia 131.  CT head unremarkable.  Patient admitted to Hospital Medicine, UA + UC pending. stared on IV ceftriaxone . CX ray -No significant intrathoracic abnormality.and BCx 2 . sono abdomen with no ascites. Experience SVT in the 160s, asymptomatic improved with  1L NS bolus initiated and carotid massage . converted to NSR on EKG. 9 episodes of BMs. lactulose held. severe metabolic acidosis likely from diarrhea. started on sodium bicarbonate drip . BCX 2 NGTD.UCx with E coli sensitive to ceftriaxone and Enterococcus sensitive to amoxicillin. Ammonia trended down to 58. Bicarb at 21. Salda[place] cath discontinued. lactulose increased to 30g TID." PT/OT recommending SNF stay.     Today, patient patient's " mentation has improved.  She is able to state her name, date of birth, year and place.  She does report incorrect age of 57.  Patient's blood pressures are soft at this time, he is asymptomatic.  She reports no bowel movements yet today but having had 4 yesterday.  Patient refused her lactulose this morning.     Patient will be treated at [facility] with PT and OT to improve functional status and ability to perform ADLs.     Hospital Course:   Patient progressed well with PT and OT- last PT note states that patient ambulated~220ft +120ft with HHA and CGA for safe balance as pt with lateral instability and decrease B push off. ~110ft with no Ad and [place] 2* increase lateral instability. Patient had no significant events during their stay at SNF.  Patient's metabolic encephalopathy resolved.  Patient's ammonia returned to normal levels prior to discharge from SNF.  Attempted to set patient up with hepatology follow-up as patient has not seen a hepatologist in years.  They would not allow appointment to be scheduled until patient was discharged.  Message sent to  to set up for appointment now that patient is discharged.  We will call patient and let her know when the appointment is.  Home health was set up. DME was ordered if needed. Follow up appointment to be made by patient within one week. All prescriptions and discharge instructions were ordered to be given to the patient prior to discharge.          Goals of Care Treatment Preferences:  Code Status: Full Code      Consults:   Consults (From admission, onward)        Status Ordering Provider     Inpatient consult to Registered Dietitian/Nutritionist  Once        Provider:  (Not yet assigned)    LUKAS Bowen notes have been filed under this hospital service.  Service: Hospital Medicine    Final Active Diagnoses:    Diagnosis Date Noted POA    PRINCIPAL PROBLEM:  Hepatic encephalopathy [K76.82] 10/11/2015 Yes    Metabolic  "acidosis [E87.20] 02/10/2023 Yes    UTI (urinary tract infection) [N39.0] 11/03/2021 Yes    Bipolar disorder, manic [F31.10] 01/21/2021 Yes    Bipolar 1 disorder [F31.9] 06/09/2020 Yes    Elevated LFTs [R79.89] 06/03/2020 Yes    Weakness [R53.1] 10/11/2015 Yes    Malnutrition [E46] 10/11/2015 Yes    Chronic liver failure [K72.11] 10/11/2015 Yes    Anemia [D64.9] 09/18/2015 Yes    History of seizure [Z87.898] 06/15/2015 Yes    Thrombocytopenia [D69.6] 06/15/2015 Yes    Cirrhosis, Laennec's [K70.30] 06/15/2015 Yes      Problems Resolved During this Admission:       Discharged Condition: good    Disposition: Home or Self Care    Follow Up:   Follow-up Information     [facility] Follow up.    Specialties: Home Health Services, Home Therapy Services, Home Living Aide Services  Why: Patient will be admitted on 03/01/23.  Contact information:  [address] [facility] [address]  [facility] [address]  [phone]           Viktor Ross MD Follow up in 6 day(s).    Specialty: Family Medicine  Why: Patient appointment is scheduled on 03/06/23 at 10:30am with Dr. Herb Brito.  Contact information:  [address] [address]  [phone]                       Patient Instructions:      BATH/SHOWER CHAIR FOR HOME USE     Order Specific Question Answer Comments   Height: 5' 3" (1.6 m)    Weight: 49.5 kg (109 lb 2 oz)    Does patient have medical equipment at home? bedside commode shower bench / shower bench / shower bench / shower bench   Does patient have medical equipment at home? walker, rolling    Does patient have medical equipment at home? wheelchair    Does patient have medical equipment at home? other (see comments)    Length of need (1-99 months): 99    Type: With back      Ambulatory referral/consult to Hepatology   Standing Status: Future   Referral Priority: Routine Referral Type: Consultation   Referral Reason: Specialty Services Required   Requested Specialty: " Hepatology   Number of Visits Requested: 1     No driving until:   Order Comments: Cleared by PCP     Notify your health care provider if you experience any of the following:  temperature >100.4     Notify your health care provider if you experience any of the following:  persistent nausea and vomiting or diarrhea     Notify your health care provider if you experience any of the following:  severe uncontrolled pain     Notify your health care provider if you experience any of the following:  redness, tenderness, or signs of infection (pain, swelling, redness, odor or green/yellow discharge around incision site)     Notify your health care provider if you experience any of the following:  difficulty breathing or increased cough     Notify your health care provider if you experience any of the following:  severe persistent headache     Notify your health care provider if you experience any of the following:  worsening rash     Notify your health care provider if you experience any of the following:  persistent dizziness, light-headedness, or visual disturbances     Notify your health care provider if you experience any of the following:  increased confusion or weakness     Activity as tolerated       Pending Diagnostic Studies:     None         Medications:  Reconciled Home Medications:      Medication List      CHANGE how you take these medications    furosemide 20 MG tablet  Commonly known as: LASIX  Take 0.5 tablets (10 mg total) by mouth 2 (two) times daily. HOLD UNTIL OTHERWISE DIRECTED BY PRIMARY CARE PROVIDER  What changed: additional instructions        CONTINUE taking these medications    ARIPiprazole 10 MG Tab  Commonly known as: ABILIFY  Take 10 mg by mouth nightly.     folic acid 1 MG tablet  Commonly known as: FOLVITE  Take 1 tablet (1 mg total) by mouth once daily.     hydrOXYzine 50 MG tablet  Commonly known as: ATARAX  Take 50 mg by mouth every evening.     lactulose 20 gram/30 mL Soln  Commonly known  as: CHRONULAC  Take 45 mLs (30 g total) by mouth 3 (three) times daily.     levETIRAcetam 500 MG Tab  Commonly known as: KEPPRA  Take 1,000 mg by mouth 2 (two) times daily.     lithium 300 mg tablet  Commonly known as: LITHOTAB  Take 300 mg by mouth nightly.     multivitamin Tab  Take 1 tablet by mouth once daily.     pantoprazole 40 MG tablet  Commonly known as: PROTONIX  Take 40 mg by mouth once daily.     propranoloL 20 MG tablet  Commonly known as: INDERAL  Take 20 mg by mouth once daily.     QUEtiapine 100 MG Tab  Commonly known as: SEROQUEL  Take 100 mg by mouth every evening.     sodium bicarbonate 650 MG tablet  Take 2 tablets (1,300 mg total) by mouth 2 (two) times daily.     spironolactone 25 MG tablet  Commonly known as: ALDACTONE  Take 25 mg by mouth once daily.     thiamine 100 MG tablet  Take 1 tablet (100 mg total) by mouth once daily.        STOP taking these medications    amoxicillin 875 MG tablet  Commonly known as: AMOXIL            Indwelling Lines/Drains at time of discharge:   Lines/Drains/Airways     None                 Time spent on the discharge of patient: 38 minutes         Robina Esquivel NP  Department of [facility]  [facility]

## 2023-02-28 NOTE — HOSPITAL COURSE
Patient progressed well with PT and OT- last PT note states that patient ambulated***. Patient had no significant events during their stay at SNF. Home health was set up. DME was ordered if needed. Follow up appointment to be made by patient within one week. All prescriptions and discharge instructions were ordered to be given to the patient prior to discharge.

## 2023-02-28 NOTE — NURSING
POC reviewed with pt, pt verbalized understanding. Safety maintained throughout shift, bed locked and in lowest position, call light in reach, Side rails up X 2. Non skid sock on when OOB. Pt remained free of fall/trauma. VSS, afebrile this shift. Pt denies pain this AM. Skin free of injury at discharge. Discharge instruction, follow up appointments and medication instructions given to pt, pt verbalized understanding.  Wheel chair van service transporting pt home. All item gathered and taken at the time of discharge.

## 2023-02-28 NOTE — PLAN OF CARE
Problem: Adult Inpatient Plan of Care  Goal: Plan of Care Review  2/28/2023 0755 by Nuzhat Gallegos LPN  Outcome: Met  2/28/2023 0755 by Nuzhat Gallegos LPN  Outcome: Ongoing, Progressing  Goal: Patient-Specific Goal (Individualized)  2/28/2023 0755 by Nuzhat Gallegos LPN  Outcome: Met  2/28/2023 0755 by Nuzhat Gallegos LPN  Outcome: Ongoing, Progressing  Goal: Absence of Hospital-Acquired Illness or Injury  2/28/2023 0755 by Nuzhat Gallegos LPN  Outcome: Met  2/28/2023 0755 by Nuzhat Gallegos LPN  Outcome: Ongoing, Progressing  Goal: Optimal Comfort and Wellbeing  2/28/2023 0755 by Nuzhat Gallegos LPN  Outcome: Met  2/28/2023 0755 by Nuzhat Gallegos LPN  Outcome: Ongoing, Progressing  Goal: Readiness for Transition of Care  2/28/2023 0755 by Nuzhat Gallegos LPN  Outcome: Met  2/28/2023 0755 by Nuzhat Gallegos LPN  Outcome: Ongoing, Progressing     Problem: Skin Injury Risk Increased  Goal: Skin Health and Integrity  2/28/2023 0755 by Nuzhat Gallegos LPN  Outcome: Met  2/28/2023 0755 by Nuzhat Gallegos LPN  Outcome: Ongoing, Progressing     Problem: Impaired Wound Healing  Goal: Optimal Wound Healing  2/28/2023 0755 by Nuzhat Gallegos LPN  Outcome: Met  2/28/2023 0755 by Nuzhat Gallegos LPN  Outcome: Ongoing, Progressing     Problem: Malnutrition  Goal: Improved Nutritional Intake  2/28/2023 0755 by Nuzhat Gallegos LPN  Outcome: Met  2/28/2023 0755 by Nuzhat Gallegos LPN  Outcome: Ongoing, Progressing     Problem: Fall Injury Risk  Goal: Absence of Fall and Fall-Related Injury  2/28/2023 0755 by Nuzhat Gallegos LPN  Outcome: Met  2/28/2023 0755 by Nuzhat Gallegos LPN  Outcome: Ongoing, Progressing

## 2023-03-03 ENCOUNTER — EXTERNAL HOME HEALTH (OUTPATIENT)
Dept: HOME HEALTH SERVICES | Facility: HOSPITAL | Age: 58
End: 2023-03-03
Payer: MEDICARE

## 2023-03-03 RX ORDER — LANOLIN ALCOHOL/MO/W.PET/CERES
100 CREAM (GRAM) TOPICAL DAILY
Qty: 30 TABLET | Refills: 2 | Status: ON HOLD | OUTPATIENT
Start: 2023-03-03 | End: 2023-05-28 | Stop reason: HOSPADM

## 2023-03-03 RX ORDER — LEVETIRACETAM 500 MG/1
1000 TABLET ORAL 2 TIMES DAILY
Qty: 60 TABLET | Refills: 3 | Status: ON HOLD | OUTPATIENT
Start: 2023-03-03 | End: 2023-05-28 | Stop reason: HOSPADM

## 2023-03-09 ENCOUNTER — DOCUMENT SCAN (OUTPATIENT)
Dept: HOME HEALTH SERVICES | Facility: HOSPITAL | Age: 58
End: 2023-03-09
Payer: MEDICARE

## 2023-03-09 ENCOUNTER — HOSPITAL ENCOUNTER (OUTPATIENT)
Facility: HOSPITAL | Age: 58
Discharge: HOME | End: 2023-03-13
Attending: EMERGENCY MEDICINE | Admitting: INTERNAL MEDICINE
Payer: MEDICARE

## 2023-03-09 DIAGNOSIS — E87.20 METABOLIC ACIDOSIS: ICD-10-CM

## 2023-03-09 DIAGNOSIS — K76.82 HEPATIC ENCEPHALOPATHY: Primary | ICD-10-CM

## 2023-03-09 DIAGNOSIS — R41.82 ALTERED MENTAL STATUS: ICD-10-CM

## 2023-03-09 DIAGNOSIS — K74.60 HEPATIC CIRRHOSIS, UNSPECIFIED HEPATIC CIRRHOSIS TYPE, UNSPECIFIED WHETHER ASCITES PRESENT: ICD-10-CM

## 2023-03-09 DIAGNOSIS — F31.4 BIPOLAR 1 DISORDER, DEPRESSED, SEVERE: ICD-10-CM

## 2023-03-09 LAB
ALBUMIN SERPL BCP-MCNC: 3.1 G/DL (ref 3.5–5.2)
ALP SERPL-CCNC: 140 U/L (ref 55–135)
ALT SERPL W/O P-5'-P-CCNC: 22 U/L (ref 10–44)
AMMONIA PLAS-SCNC: 55 UMOL/L (ref 10–50)
ANION GAP SERPL CALC-SCNC: 8 MMOL/L (ref 8–16)
APTT BLDCRRT: 33.9 SEC (ref 21–32)
AST SERPL-CCNC: 41 U/L (ref 10–40)
BASOPHILS # BLD AUTO: 0.02 K/UL (ref 0–0.2)
BASOPHILS NFR BLD: 0.4 % (ref 0–1.9)
BILIRUB SERPL-MCNC: 4.5 MG/DL (ref 0.1–1)
BILIRUB UR QL STRIP: NEGATIVE
BUN SERPL-MCNC: 14 MG/DL (ref 6–20)
CALCIUM SERPL-MCNC: 9.7 MG/DL (ref 8.7–10.5)
CHLORIDE SERPL-SCNC: 114 MMOL/L (ref 95–110)
CLARITY UR: CLEAR
CO2 SERPL-SCNC: 18 MMOL/L (ref 23–29)
COLOR UR: YELLOW
CREAT SERPL-MCNC: 0.7 MG/DL (ref 0.5–1.4)
DIFFERENTIAL METHOD: ABNORMAL
EOSINOPHIL # BLD AUTO: 0.1 K/UL (ref 0–0.5)
EOSINOPHIL NFR BLD: 3.1 % (ref 0–8)
ERYTHROCYTE [DISTWIDTH] IN BLOOD BY AUTOMATED COUNT: 15.4 % (ref 11.5–14.5)
EST. GFR  (NO RACE VARIABLE): >60 ML/MIN/1.73 M^2
ETHANOL SERPL-MCNC: <10 MG/DL
GLUCOSE SERPL-MCNC: 91 MG/DL (ref 70–110)
GLUCOSE UR QL STRIP: NEGATIVE
HCT VFR BLD AUTO: 41.3 % (ref 37–48.5)
HGB BLD-MCNC: 13.2 G/DL (ref 12–16)
HGB UR QL STRIP: NEGATIVE
IMM GRANULOCYTES # BLD AUTO: 0 K/UL (ref 0–0.04)
IMM GRANULOCYTES NFR BLD AUTO: 0 % (ref 0–0.5)
INR PPP: 1.6 (ref 0.8–1.2)
KETONES UR QL STRIP: NEGATIVE
LEUKOCYTE ESTERASE UR QL STRIP: NEGATIVE
LIPASE SERPL-CCNC: 43 U/L (ref 4–60)
LITHIUM SERPL-SCNC: 0.2 MMOL/L (ref 0.6–1.2)
LYMPHOCYTES # BLD AUTO: 1.3 K/UL (ref 1–4.8)
LYMPHOCYTES NFR BLD: 28.1 % (ref 18–48)
MAGNESIUM SERPL-MCNC: 2 MG/DL (ref 1.6–2.6)
MCH RBC QN AUTO: 33.1 PG (ref 27–31)
MCHC RBC AUTO-ENTMCNC: 32 G/DL (ref 32–36)
MCV RBC AUTO: 104 FL (ref 82–98)
MONOCYTES # BLD AUTO: 0.4 K/UL (ref 0.3–1)
MONOCYTES NFR BLD: 8.8 % (ref 4–15)
NEUTROPHILS # BLD AUTO: 2.7 K/UL (ref 1.8–7.7)
NEUTROPHILS NFR BLD: 59.6 % (ref 38–73)
NITRITE UR QL STRIP: NEGATIVE
NRBC BLD-RTO: 0 /100 WBC
PH UR STRIP: 7 [PH] (ref 5–8)
PLATELET # BLD AUTO: 110 K/UL (ref 150–450)
PMV BLD AUTO: 11.1 FL (ref 9.2–12.9)
POTASSIUM SERPL-SCNC: 5.1 MMOL/L (ref 3.5–5.1)
PROT SERPL-MCNC: 7.2 G/DL (ref 6–8.4)
PROT UR QL STRIP: NEGATIVE
PROTHROMBIN TIME: 16 SEC (ref 9–12.5)
RBC # BLD AUTO: 3.99 M/UL (ref 4–5.4)
SODIUM SERPL-SCNC: 140 MMOL/L (ref 136–145)
SP GR UR STRIP: 1.02 (ref 1–1.03)
URN SPEC COLLECT METH UR: NORMAL
UROBILINOGEN UR STRIP-ACNC: NEGATIVE EU/DL
WBC # BLD AUTO: 4.45 K/UL (ref 3.9–12.7)

## 2023-03-09 PROCEDURE — 82140 ASSAY OF AMMONIA: CPT | Performed by: EMERGENCY MEDICINE

## 2023-03-09 PROCEDURE — 81003 URINALYSIS AUTO W/O SCOPE: CPT | Mod: 59 | Performed by: EMERGENCY MEDICINE

## 2023-03-09 PROCEDURE — 82077 ASSAY SPEC XCP UR&BREATH IA: CPT | Performed by: EMERGENCY MEDICINE

## 2023-03-09 PROCEDURE — 80053 COMPREHEN METABOLIC PANEL: CPT | Performed by: EMERGENCY MEDICINE

## 2023-03-09 PROCEDURE — 83735 ASSAY OF MAGNESIUM: CPT | Performed by: EMERGENCY MEDICINE

## 2023-03-09 PROCEDURE — 80178 ASSAY OF LITHIUM: CPT

## 2023-03-09 PROCEDURE — 25000003 PHARM REV CODE 250: Performed by: STUDENT IN AN ORGANIZED HEALTH CARE EDUCATION/TRAINING PROGRAM

## 2023-03-09 PROCEDURE — 63600175 PHARM REV CODE 636 W HCPCS: Performed by: STUDENT IN AN ORGANIZED HEALTH CARE EDUCATION/TRAINING PROGRAM

## 2023-03-09 PROCEDURE — 83690 ASSAY OF LIPASE: CPT | Performed by: EMERGENCY MEDICINE

## 2023-03-09 PROCEDURE — 85610 PROTHROMBIN TIME: CPT

## 2023-03-09 PROCEDURE — 99285 EMERGENCY DEPT VISIT HI MDM: CPT | Mod: 25

## 2023-03-09 PROCEDURE — 96365 THER/PROPH/DIAG IV INF INIT: CPT

## 2023-03-09 PROCEDURE — G0378 HOSPITAL OBSERVATION PER HR: HCPCS

## 2023-03-09 PROCEDURE — P9612 CATHETERIZE FOR URINE SPEC: HCPCS

## 2023-03-09 PROCEDURE — 93010 ELECTROCARDIOGRAM REPORT: CPT | Mod: ,,, | Performed by: INTERNAL MEDICINE

## 2023-03-09 PROCEDURE — 93010 EKG 12-LEAD: ICD-10-PCS | Mod: ,,, | Performed by: INTERNAL MEDICINE

## 2023-03-09 PROCEDURE — 80307 DRUG TEST PRSMV CHEM ANLYZR: CPT | Performed by: STUDENT IN AN ORGANIZED HEALTH CARE EDUCATION/TRAINING PROGRAM

## 2023-03-09 PROCEDURE — 96361 HYDRATE IV INFUSION ADD-ON: CPT

## 2023-03-09 PROCEDURE — 85025 COMPLETE CBC W/AUTO DIFF WBC: CPT | Performed by: EMERGENCY MEDICINE

## 2023-03-09 PROCEDURE — 25000003 PHARM REV CODE 250: Performed by: EMERGENCY MEDICINE

## 2023-03-09 PROCEDURE — 93005 ELECTROCARDIOGRAM TRACING: CPT

## 2023-03-09 PROCEDURE — 85730 THROMBOPLASTIN TIME PARTIAL: CPT

## 2023-03-09 RX ORDER — NALOXONE HCL 0.4 MG/ML
0.02 VIAL (ML) INJECTION
Status: DISCONTINUED | OUTPATIENT
Start: 2023-03-09 | End: 2023-03-13 | Stop reason: HOSPADM

## 2023-03-09 RX ORDER — PANTOPRAZOLE SODIUM 40 MG/1
40 TABLET, DELAYED RELEASE ORAL DAILY
Status: DISCONTINUED | OUTPATIENT
Start: 2023-03-10 | End: 2023-03-13 | Stop reason: HOSPADM

## 2023-03-09 RX ORDER — THIAMINE HCL 100 MG
100 TABLET ORAL DAILY
Status: DISCONTINUED | OUTPATIENT
Start: 2023-03-10 | End: 2023-03-09

## 2023-03-09 RX ORDER — GLUCAGON 1 MG
1 KIT INJECTION
Status: DISCONTINUED | OUTPATIENT
Start: 2023-03-09 | End: 2023-03-13 | Stop reason: HOSPADM

## 2023-03-09 RX ORDER — SODIUM CHLORIDE 0.9 % (FLUSH) 0.9 %
10 SYRINGE (ML) INJECTION EVERY 12 HOURS PRN
Status: DISCONTINUED | OUTPATIENT
Start: 2023-03-09 | End: 2023-03-13 | Stop reason: HOSPADM

## 2023-03-09 RX ORDER — FOLIC ACID 1 MG/1
1 TABLET ORAL DAILY
Status: DISCONTINUED | OUTPATIENT
Start: 2023-03-10 | End: 2023-03-13 | Stop reason: HOSPADM

## 2023-03-09 RX ORDER — DEXTROSE 40 %
15 GEL (GRAM) ORAL
Status: DISCONTINUED | OUTPATIENT
Start: 2023-03-09 | End: 2023-03-13 | Stop reason: HOSPADM

## 2023-03-09 RX ORDER — ZONISAMIDE 100 MG/1
100 CAPSULE ORAL DAILY
Status: ON HOLD | COMMUNITY
Start: 2022-08-12 | End: 2023-06-30 | Stop reason: HOSPADM

## 2023-03-09 RX ORDER — DEXTROSE 40 %
30 GEL (GRAM) ORAL
Status: DISCONTINUED | OUTPATIENT
Start: 2023-03-09 | End: 2023-03-13 | Stop reason: HOSPADM

## 2023-03-09 RX ORDER — ARIPIPRAZOLE 5 MG/1
10 TABLET ORAL NIGHTLY
Status: DISCONTINUED | OUTPATIENT
Start: 2023-03-09 | End: 2023-03-13 | Stop reason: HOSPADM

## 2023-03-09 RX ORDER — TALC
6 POWDER (GRAM) TOPICAL NIGHTLY PRN
Status: DISCONTINUED | OUTPATIENT
Start: 2023-03-09 | End: 2023-03-13 | Stop reason: HOSPADM

## 2023-03-09 RX ORDER — PROPRANOLOL HYDROCHLORIDE 10 MG/1
20 TABLET ORAL DAILY
Status: DISCONTINUED | OUTPATIENT
Start: 2023-03-10 | End: 2023-03-13 | Stop reason: HOSPADM

## 2023-03-09 RX ORDER — LEVETIRACETAM 500 MG/1
1000 TABLET ORAL 2 TIMES DAILY
Status: DISCONTINUED | OUTPATIENT
Start: 2023-03-09 | End: 2023-03-13 | Stop reason: HOSPADM

## 2023-03-09 RX ORDER — LACTULOSE 10 G/15ML
30 SOLUTION ORAL EVERY 6 HOURS
Status: DISCONTINUED | OUTPATIENT
Start: 2023-03-10 | End: 2023-03-13 | Stop reason: HOSPADM

## 2023-03-09 RX ORDER — SODIUM BICARBONATE 650 MG/1
1300 TABLET ORAL 2 TIMES DAILY
Status: DISCONTINUED | OUTPATIENT
Start: 2023-03-09 | End: 2023-03-13 | Stop reason: HOSPADM

## 2023-03-09 RX ORDER — HYDROXYZINE PAMOATE 25 MG/1
25 CAPSULE ORAL EVERY 8 HOURS PRN
Status: DISCONTINUED | OUTPATIENT
Start: 2023-03-09 | End: 2023-03-10

## 2023-03-09 RX ADMIN — SODIUM BICARBONATE 1300 MG: 650 TABLET ORAL at 08:03

## 2023-03-09 RX ADMIN — LACTULOSE 30 G: 20 SOLUTION ORAL at 11:03

## 2023-03-09 RX ADMIN — LEVETIRACETAM 1000 MG: 500 TABLET, FILM COATED ORAL at 08:03

## 2023-03-09 RX ADMIN — SODIUM CHLORIDE 1000 ML: 0.9 INJECTION, SOLUTION INTRAVENOUS at 01:03

## 2023-03-09 RX ADMIN — THIAMINE HYDROCHLORIDE 500 MG: 100 INJECTION, SOLUTION INTRAMUSCULAR; INTRAVENOUS at 08:03

## 2023-03-09 RX ADMIN — ARIPIPRAZOLE 10 MG: 5 TABLET ORAL at 08:03

## 2023-03-09 NOTE — Clinical Note
Diagnosis: Altered mental status [780.97.ICD-9-CM]   Future Attending Provider: NITO VILLALBA [[id number]]   Admitting Provider:: NITO VILLALBA [[id number]]   Special Needs:: Fall Risk [15]

## 2023-03-09 NOTE — ED NOTES
"Prema CALVERT and Amy CALVERT at  for I/O cath; pt is more talkative however still disoriented x 4, replies "I know" when asked orientation questions.  "

## 2023-03-09 NOTE — ED PROVIDER NOTES
Encounter Date: 3/9/2023       History     Chief Complaint   Patient presents with    Altered Mental Status     Brought to Ed from home for AMS. Pt has hx of hepatic encephalopathy. Pt is poor historian.      59 y/o F brought to the ER for evaluation of AMS. Px with h/o hepatic encephalopathy. On arrival, oriented to person only. No other history immediately available.     Review of patient's allergies indicates:   Allergen Reactions    Sulfa (sulfonamide antibiotics) Rash    Codeine Nausea And Vomiting     Past Medical History:   Diagnosis Date    Addiction to drug     Alcohol abuse     Alcohol abuse, in remission 6/15/2015    14.5 weeks ago; AA weekly    Anemia     Anxiety 6/15/2015    Behavioral problem     Bipolar disorder     Bipolar disorder in remission 6/15/2015    Cirrhosis, Laennec's 6/15/2015    Depression     Encounter for blood transfusion     Epistaxis 6/15/2015    Fatigue     Glaucoma     Hematuria     Hepatic encephalopathy 6/15/2015    Hepatic enlargement     History of psychiatric care     History of psychiatric hospitalization     History of seizure 6/15/2015    1    hx of intentional Tylenol overdose 6/15/2015    2005- situational and hx of bipolar    Hx of psychiatric care     Macrocytic anemia 9/18/2015    6 units PRBC since June 2015    Jeana     Osteoarthritis     Other ascites 6/15/2015    Psychiatric exam requested by authority     Psychiatric problem     Psychosis 9/26/2019    Renal disorder     Seizures     Self-harming behavior     Suicide attempt     Therapy     Thrombocytopenia 6/15/2015     Past Surgical History:   Procedure Laterality Date    COSMETIC SURGERY      ESOPHAGOGASTRODUODENOSCOPY       Family History   Problem Relation Age of Onset    Alcohol abuse Father     Suicide Father     Bipolar disorder Father     Alcohol abuse Sister     Hypertension Mother     Alcohol abuse Paternal Grandfather     Drug abuse Neg Hx     Dementia Neg Hx      Social History     Tobacco Use     Smoking status: Never    Smokeless tobacco: Never   Substance Use Topics    Alcohol use: Not Currently     Comment: hx of ETOH abuse with cirrhosis of liver    Drug use: No     Review of Systems   Unable to perform ROS: Mental status change     Physical Exam     Initial Vitals [03/09/23 1227]   BP Pulse Resp Temp SpO2   132/75 60 18 98.8 °F (37.1 °C) 99 %      MAP       --         Physical Exam    Nursing note and vitals reviewed.  Constitutional: She appears well-developed and well-nourished. No distress.   HENT:   Head: Normocephalic and atraumatic.   Eyes: Conjunctivae and EOM are normal. Pupils are equal, round, and reactive to light.   Neck: Neck supple. No tracheal deviation present.   Normal range of motion.  Cardiovascular:  Normal rate and intact distal pulses.           Pulmonary/Chest: No respiratory distress.   Abdominal: Abdomen is soft. She exhibits no distension. There is no abdominal tenderness.   Musculoskeletal:         General: No tenderness. Normal range of motion.      Cervical back: Normal range of motion and neck supple.     Neurological: She is alert. She has normal strength. No cranial nerve deficit. GCS score is 15. GCS eye subscore is 4. GCS verbal subscore is 5. GCS motor subscore is 6.   Not oriented to person, place, or time   Skin: Skin is warm and dry.       ED Course   Critical Care    Date/Time: 3/9/2023 4:54 PM  Performed by: Juan Devine MD  Authorized by: Juan Devine MD   Direct patient critical care time: 15 minutes  Additional history critical care time: 15 minutes  Ordering / reviewing critical care time: 15 minutes  Documentation critical care time: 15 minutes  Consulting other physicians critical care time: 15 minutes  Consult with family critical care time: 10 minutes  Total critical care time (exclusive of procedural time) : 85 minutes  Critical care time was exclusive of separately billable procedures and treating other patients.  Critical care was  necessary to treat or prevent imminent or life-threatening deterioration of the following conditions: CNS failure or compromise.  Critical care was time spent personally by me on the following activities: development of treatment plan with patient or surrogate, interpretation of cardiac output measurements, evaluation of patient's response to treatment, examination of patient, obtaining history from patient or surrogate, ordering and review of laboratory studies, ordering and performing treatments and interventions, ordering and review of radiographic studies, pulse oximetry, review of old charts and re-evaluation of patient's condition.      Labs Reviewed   CBC W/ AUTO DIFFERENTIAL - Abnormal; Notable for the following components:       Result Value    RBC 3.99 (*)      (*)     MCH 33.1 (*)     RDW 15.4 (*)     Platelets 110 (*)     All other components within normal limits   COMPREHENSIVE METABOLIC PANEL - Abnormal; Notable for the following components:    Chloride 114 (*)     CO2 18 (*)     Albumin 3.1 (*)     Total Bilirubin 4.5 (*)     Alkaline Phosphatase 140 (*)     AST 41 (*)     All other components within normal limits   AMMONIA - Abnormal; Notable for the following components:    Ammonia 55 (*)     All other components within normal limits   URINALYSIS   ALCOHOL,MEDICAL (ETHANOL)   LIPASE   MAGNESIUM   AMMONIA   POCT GLUCOSE MONITORING CONTINUOUS     EKG Readings: (Independently Interpreted)   Initial Reading: No STEMI. Previous EKG: Compared with most recent EKG Previous EKG Date: 2/10/2023 (Minimal change). Rhythm: Normal Sinus Rhythm. Heart Rate: 57. Ectopy: No Ectopy. Conduction: Normal. ST Segments: Normal ST Segments. Axis: Normal.   EKG independently interpreted by me pending cardiology review:    ECG Results              EKG 12-lead (In process)  Result time 03/09/23 15:44:46      In process by Interface, Lab In [facility] (03/09/23 15:44:46)                   Narrative:    Test Reason :  R41.82,    Vent. Rate : 057 BPM     Atrial Rate : 057 BPM     P-R Int : 148 ms          QRS Dur : 082 ms      QT Int : 476 ms       P-R-T Axes : 044 004 060 degrees     QTc Int : 463 ms    Sinus bradycardia  Possible Anterior infarct (cited on or before 09-MAR-2023)  Abnormal ECG  When compared with ECG of 10-FEB-2023 05:14,  Vent. rate has decreased BY  28 BPM  Questionable change in initial forces of Septal leads  T wave inversion no longer evident in Inferior leads  T wave inversion no longer evident in Lateral leads    Referred By: [email or web address]   SELF           Confirmed By:                                       X-Rays:   Independently Interpreted Readings:   Other Readings:  CXR independently interpreted by me pending radiology review: No acute infiltrate, no pneumothorax, no effusion    CT Head independently interpreted by me pending radiology review: No acute intracranial findings   Imaging Results              US Abdomen Limited (Final result)  Result time 03/09/23 16:29:13      Final result by Bora Mckeon MD (03/09/23 16:29:13)                   Impression:      1. Cholelithiasis.  2. Mild splenomegaly.  3. Right pleural effusion.      Electronically signed by: Bora Mckeon  Date:    03/09/2023  Time:    16:29               Narrative:    EXAMINATION:  US ABDOMEN LIMITED    CLINICAL HISTORY:  Hyperbilirubinemia;    TECHNIQUE:  Limited abdominal ultrasound.    COMPARISON:  02/09/2023, CT 01/21/2021    FINDINGS:  Liver: Normal in size, measuring 11.2 cm. Homogeneous echotexture. No focal hepatic lesions.    Gallbladder: Multiple echogenic stones are present.  The largest measures 1.2 cm.  Negative sonographic Tang sign.  Gallbladder is contracted with limited visualization.  Mild wall thickening is not excluded.  No pericholecystic fluid.  No wall hypervascularity.  The patient is unable to left lateral decubitus.    Biliary system: The common duct is not dilated, measuring 5 mm.  No intrahepatic ductal  dilatation.    No acute abnormality of the inferior vena cava.    The spleen measures 12.7 cm.    Limited visualization of the pancreas which is obscured by gas.    Miscellaneous: No upper abdominal ascites.    Right pleural effusion is noted.    No evidence of hydronephrosis of the right kidney on limited visualization.    CT with contrast may better characterize.                                       CT Head Without Contrast (Final result)  Result time 03/09/23 15:12:35      Final result by Mikey Rodriguez DO (03/09/23 15:12:35)                   Impression:      Allowing for slight motion artifact there is no definite acute intracranial findings specifically without evidence for acute intracranial hemorrhage or sulcal effacement to suggest large territory recent infarction    Clinical correlation and further evaluation as warranted.      Electronically signed by: Mikey Rodriguez DO  Date:    03/09/2023  Time:    15:12               Narrative:    EXAMINATION:  CT HEAD WITHOUT CONTRAST    CLINICAL HISTORY:  Mental status change, unknown cause;    TECHNIQUE:  Multiple sequential 5 mm axial images of the head without contrast.  Coronal and sagittal reformatted imaging from the axial acquisition.    COMPARISON:  02/07/2023    FINDINGS:  Study is distorted by artifact from motion.  Subcentimeter lipoma along the interhemispheric fissure posteriorly stable    Mild ill-defined decreased attenuation supratentorial white matter while nonspecific concerning for underlying chronic ischemic change.  Allowing for artifacts there is no evidence for acute intracranial hemorrhage or sulcal effacement.  The ventricles are normal in size without hydrocephalus.  There is no midline shift or mass effect.  Small lobular opacities maxillary antra with moderate opacification right ethmoid air cells.  Remaining visualized paranasal sinuses and mastoid air cells are clear.  Incidental probable cosmetic implants pre maxillary soft tissues  bilaterally.                                       X-Ray Chest AP Portable (Final result)  Result time 03/09/23 13:51:46      Final result by Mikey Rodriguez DO (03/09/23 13:51:46)                   Impression:      Please see above      Electronically signed by: Mikey Rodriguez DO  Date:    03/09/2023  Time:    13:51               Narrative:    EXAMINATION:  XR CHEST AP PORTABLE    CLINICAL HISTORY:  Altered mental status;    TECHNIQUE:  Single frontal view of the chest was performed.    COMPARISON:  02/16/2023    FINDINGS:  Small lung volumes likely to poor inspiratory effort.  Reduced perihilar and ill-defined lung opacities which may represent resolving vascular congestion and edema.  There is no significant new lung opacity.  No large lung consolidation.  No large effusion or pneumothorax.  Further evaluation as warranted clinically.                                      Medications   sodium chloride 0.9% bolus 1,000 mL 1,000 mL (0 mLs Intravenous Stopped 3/9/23 1502)     Medical Decision Making:   History:   I obtained history from: someone other than patient and EMS provider.       <> Summary of History: History per EMS, as patient is altered   Old Medical Records: I decided to obtain old medical records.  Old Records Summarized: records from clinic visits.       <> Summary of Records: Reviewed recent discharge summary for similar presentation, documenting PMH and baseline meds  Initial Assessment:   59 y/o F brought to the ER for evaluation of AMS  Differential Diagnosis:   Hepatic v metabolic v infectious encephalopathy, CVA, dehydration, electrolyte dyscrasia   Independently Interpreted Test(s):   I have ordered and independently interpreted X-rays - see prior notes.  I have ordered and independently interpreted EKG Reading(s) - see prior notes  Clinical Tests:   Lab Tests: Reviewed       <> Summary of Lab: CBC without leukocytosis, minimal anemia; CMP with elevated LFTS, normal renal functions tests, normal  electrolytes; ammonia elevated; UA without overt signs of infection   ED Management:  I ordered an US due to elevated bilirubin and LFTs, which fortunately did not reveal any acute pathology. Labs concerning for elevated ammonia. Patient given IVF. On reeval, patient is now oriented to person. D/W [facility], who will see and admit patient.                         Clinical Impression:   Final diagnoses:  [R41.82] Altered mental status  [K76.82] Hepatic encephalopathy (Primary)        ED Disposition Condition    Observation Stable                Juan Devine MD  03/09/23 [id number]

## 2023-03-09 NOTE — ED NOTES
Pt's sister Zaria states pt has hx of bipolar, has been seeing psychiatrist over 10yrs; states pt may have taken an additional dose of medications but is not sure; pt lives at home with mother.

## 2023-03-09 NOTE — H&P
"[facility] Medicine H&P Note     Admitting Team: [facility] Hospitalist Team B  Attending Physician: Andre Nicholson MD  Resident: Nathaly Rutledge MD  Intern: Bang Neal MD (Nak)    Date of Admit: 3/9/2023    Chief Complaint     Altered Mental Status (Brought to Ed from home for AMS. Pt has hx of hepatic encephalopathy. Pt is poor historian. )   for unknown duration at this time.    Subjective:      History of Present Illness:  Marely Hamilton is a 58 y.o. female with a PMHx of alcoholic cirrhosis, hepatic encephalopathy, seizure, bipolar 1 disorder. The patient presented on 3/9/2023 for evaluation of AMS. Work-up in the ED notable for elevated NH3, elevated ALP, and U/S abdomen concerning for cholelithiasis w/ R pleural effusion. Ethanol levels and U/A were unremarkable, as well as CTH showed no acute findings. Per ED, pt was given lactulose (not charted at the time this note was created). Of note, pt was previously admitted here last month for AMS 2/2 hepatic encephalopathy.    Pt seen at bedside. Pt provided limited hx given only oriented to self but not oriented to location, situation, or time. Unable to provide HPI or ROS but stated living w/ her mother. Pt was calm, cooperative, and interactive during interview. Pt did not voice any complaints at this time. Pt will be admitted to [facility] IM for the evaluation and management of AMS.     Collateral Mother (Anastasia Hamilton; [phone]); called around 1930hrs on 3/9/23  No answer; left voice-message    Past Medical History:  Past Medical History:   Diagnosis Date    Addiction to drug     Alcohol abuse     Alcohol abuse, in remission 6/15/2015    14.5 weeks ago; AA weekly    Anemia     Anxiety 6/15/2015    Behavioral problem     Bipolar disorder     Bipolar disorder in remission 6/15/2015    Cirrhosis, Laennec's 6/15/2015    Depression     Encounter for blood transfusion     Epistaxis 6/15/2015    Fatigue     Glaucoma     Hematuria     Hepatic encephalopathy 6/15/2015 "    Hepatic enlargement     History of psychiatric care     History of psychiatric hospitalization     History of seizure 6/15/2015    1    hx of intentional Tylenol overdose 6/15/2015    2005- situational and hx of bipolar    Hx of psychiatric care     Macrocytic anemia 9/18/2015    6 units PRBC since June 2015    Jeana     Osteoarthritis     Other ascites 6/15/2015    Psychiatric exam requested by authority     Psychiatric problem     Psychosis 9/26/2019    Renal disorder     Seizures     Self-harming behavior     Suicide attempt     Therapy     Thrombocytopenia 6/15/2015       Past Surgical History:  Past Surgical History:   Procedure Laterality Date    COSMETIC SURGERY      ESOPHAGOGASTRODUODENOSCOPY         Allergies:  Review of patient's allergies indicates:   Allergen Reactions    Sulfa (sulfonamide antibiotics) Rash    Codeine Nausea And Vomiting       Home Medications:  Prior to Admission medications    Medication Sig Start Date End Date Taking? Authorizing Provider   ARIPiprazole (ABILIFY) 10 MG Tab Take 10 mg by mouth nightly. 11/25/22   Historical Provider   folic acid (FOLVITE) 1 MG tablet Take 1 tablet (1 mg total) by mouth once daily. 2/13/23 2/13/24  Seth Noyola MD   furosemide (LASIX) 20 MG tablet Take 0.5 tablets (10 mg total) by mouth 2 (two) times daily. HOLD UNTIL OTHERWISE DIRECTED BY PRIMARY CARE PROVIDER 2/27/23 2/27/24  Robina Esquivel NP   hydrOXYzine (ATARAX) 50 MG tablet Take 50 mg by mouth every evening. 12/3/22   Historical Provider   lactulose (CHRONULAC) 20 gram/30 mL Soln Take 45 mLs (30 g total) by mouth 3 (three) times daily. 2/14/23 3/16/23  Seth Noyola MD   levETIRAcetam (KEPPRA) 500 MG Tab Take 2 tablets (1,000 mg total) by mouth 2 (two) times daily. 3/3/23   Robina Esquivel NP   lithium (LITHOTAB) 300 mg tablet Take 300 mg by mouth nightly. 12/20/22   Historical Provider   multivitamin Tab Take 1 tablet by mouth once daily. 2/2/21   Lucero Castillo MD  "  pantoprazole (PROTONIX) 40 MG tablet Take 40 mg by mouth once daily. 12/3/22   Historical Provider   propranoloL (INDERAL) 20 MG tablet Take 20 mg by mouth once daily. 12/3/22   Historical Provider   QUEtiapine (SEROQUEL) 100 MG Tab Take 100 mg by mouth every evening. 22   Historical Provider   sodium bicarbonate 650 MG tablet Take 2 tablets (1,300 mg total) by mouth 2 (two) times daily. 23  Seth Noyola MD   spironolactone (ALDACTONE) 25 MG tablet Take 25 mg by mouth once daily. 12/3/22   Historical Provider   thiamine 100 MG tablet Take 1 tablet (100 mg total) by mouth once daily. 3/3/23   Robina Esquivel NP       Family History:  Family History   Problem Relation Age of Onset    Alcohol abuse Father     Suicide Father     Bipolar disorder Father     Alcohol abuse Sister     Hypertension Mother     Alcohol abuse Paternal Grandfather     Drug abuse Neg Hx     Dementia Neg Hx        Social History:  Social History     Tobacco Use    Smoking status: Never    Smokeless tobacco: Never   Substance Use Topics    Alcohol use: Not Currently     Comment: hx of ETOH abuse with cirrhosis of liver    Drug use: No     Review of Systems:  Review of Systems   Unable to perform ROS: Mental status change     Health Care Maintenance :   Primary Care Physician: Viktor Ross MD   Immunizations:   Immunization History   Administered Date(s) Administered    COVID-19, MRNA, LN-S, PF (MODERNA FULL 0.5 ML DOSE) 2021, 2021, 2022      Cancer Screening:  PAP: unable to assess  MMG: unable to assess  Colonoscopy: unable to assess    Objective:   Last 24 Hour Vital Signs:  BP  Min: 132/75  Max: 132/75  Temp  Av.8 °F (37.1 °C)  Min: 98.8 °F (37.1 °C)  Max: 98.8 °F (37.1 °C)  Pulse  Av  Min: 60  Max: 60  Resp  Av  Min: 18  Max: 18  SpO2  Av %  Min: 99 %  Max: 99 %  Height  Av' 3" (160 cm)  Min: 5' 3" (160 cm)  Max: 5' 3" (160 cm)  Weight  Av.7 kg (103 lb)  Min: 46.7 kg " (103 lb)  Max: 46.7 kg (103 lb)  Body mass index is 18.25 kg/m².  No intake/output data recorded.    Physical Examination:  Physical Exam   Constitutional: No distress.   HENT:   Head: Normocephalic and atraumatic.   Nose: Nose normal.   Mouth/Throat: Mucous membranes are moist. Oropharynx is clear.   Eyes: Scleral icterus is present.   Cardiovascular: Normal rate, regular rhythm, normal heart sounds and normal pulses. Pulmonary:      Effort: Pulmonary effort is normal.      Breath sounds: Normal breath sounds.     Abdominal: Soft.   Neurological: She is alert. She has normal motor skills and normal sensation. She is disoriented. Coordination normal.   Oriented to self only; disoriented to time, location, and situation.  Unable to follow one-step commands  +Asterixis   Skin: Skin is warm and dry. Capillary refill takes less than 2 seconds.   Psychiatric: Her behavior is normal.      Laboratory:  Most Recent Data:  CBC:   Lab Results   Component Value Date    WBC 4.45 03/09/2023    HGB 13.2 03/09/2023    HCT 41.3 03/09/2023     (L) 03/09/2023     (H) 03/09/2023    RDW 15.4 (H) 03/09/2023     WBC Differential: 2.7 % N, 0 % Bands, 1.3 % L, 0.4 % M, 0.1 % Eo, 0.02 % Baso, no additional cells seen  BMP:   Lab Results   Component Value Date     03/09/2023    K 5.1 03/09/2023     (H) 03/09/2023    CO2 18 (L) 03/09/2023    BUN 14 03/09/2023    CREATININE 0.7 03/09/2023    GLU 91 03/09/2023    CALCIUM 9.7 03/09/2023    MG 2.0 03/09/2023    PHOS 3.1 02/27/2023     LFTs:   Lab Results   Component Value Date    PROT 7.2 03/09/2023    ALBUMIN 3.1 (L) 03/09/2023    BILITOT 4.5 (H) 03/09/2023    AST 41 (H) 03/09/2023    ALKPHOS 140 (H) 03/09/2023    ALT 22 03/09/2023     (H) 06/02/2020     Coags:   Lab Results   Component Value Date    INR 1.6 (H) 02/14/2023     FLP:   Lab Results   Component Value Date    CHOL 138 12/11/2020    HDL 48 12/11/2020    LDLCALC 82.2 12/11/2020    TRIG 39 12/11/2020     CHOLHDL 34.8 12/11/2020     DM:   Lab Results   Component Value Date    HGBA1C 4.1 01/22/2021    HGBA1C 4.6 12/10/2020    HGBA1C 4.1 05/16/2020    LDLCALC 82.2 12/11/2020    CREATININE 0.7 03/09/2023     Thyroid:   Lab Results   Component Value Date    TSH 0.361 (L) 02/07/2023    FREET4 0.81 02/07/2023     Anemia:   Lab Results   Component Value Date    IRON 208 (H) 10/23/2015    TIBC 244 (L) 10/23/2015    FERRITIN 412 (H) 10/23/2015    HMIEWNFE19 944 02/10/2023    FOLATE 15.1 02/10/2023     Cardiac:   Lab Results   Component Value Date    TROPONINI <0.006 01/25/2023     Urinalysis:   Lab Results   Component Value Date    LABURIN (A) 02/16/2023     ESCHERICHIA COLI  10,000 - 49,999 cfu/ml  No other significant isolate      COLORU Yellow 03/09/2023    SPECGRAV 1.020 03/09/2023    NITRITE Negative 03/09/2023    KETONESU Negative 03/09/2023    UROBILINOGEN Negative 03/09/2023    WBCUA 29 (H) 02/16/2023     Trended Lab Data:  Recent Labs   Lab 03/09/23  1323   WBC 4.45   HGB 13.2   HCT 41.3   *   *   RDW 15.4*      K 5.1   *   CO2 18*   BUN 14   CREATININE 0.7   GLU 91   PROT 7.2   ALBUMIN 3.1*   BILITOT 4.5*   AST 41*   ALKPHOS 140*   ALT 22     Trended Cardiac Data:  No results for input(s): TROPONINI, CKTOTAL, CKMB, BNP in the last 168 hours.  Microbiology Data:  Microbiology Results (last 7 days)       ** No results found for the last 168 hours. **           Other Results:  EKG (my interpretation): sinus adan w/ abnormal EKG changes from previous  .  ECG Results              EKG 12-lead (In process)  Result time 03/09/23 15:44:46      In process by Interface, Lab In [facility] (03/09/23 15:44:46)                   Narrative:    Test Reason : R41.82,    Vent. Rate : 057 BPM     Atrial Rate : 057 BPM     P-R Int : 148 ms          QRS Dur : 082 ms      QT Int : 476 ms       P-R-T Axes : 044 004 060 degrees     QTc Int : 463 ms    Sinus bradycardia  Possible Anterior infarct (cited on or before  09-MAR-2023)  Abnormal ECG  When compared with ECG of 10-FEB-2023 05:14,  Vent. rate has decreased BY  28 BPM  Questionable change in initial forces of Septal leads  T wave inversion no longer evident in Inferior leads  T wave inversion no longer evident in Lateral leads    Referred By: [email or web address]   SELF           Confirmed By:                                   Radiology:  X-Ray Chest 1 View    Result Date: 2/16/2023  EXAMINATION: XR CHEST 1 VIEW CLINICAL HISTORY: infectious w/u; TECHNIQUE: Single frontal view of the chest was performed. COMPARISON: 02/09/2023 FINDINGS: Less complete inspiration, rotate chest left.  Heart top normal size.  Mild generalized prominence of interstitial markings with overall remote appearance.  Chronic pleuroparenchymal change left cardiophrenic angle fat pad.     Chronic interstitial fibrotic change.  No pneumonia or other acute change. Electronically signed by: Darien Bernard MD Date:    02/16/2023 Time:    16:16    X-Ray Chest 1 View    Result Date: 2/9/2023  EXAMINATION: XR CHEST 1 VIEW CLINICAL HISTORY: r/o pneumonia; TECHNIQUE: One view COMPARISON: Comparison is made to 01/25/2023. FINDINGS: Heart size is normal, as is the appearance of the pulmonary vascularity.  Lung zones are clear, and are free of significant airspace consolidation or volume loss.  No pleural fluid.  No hilar or mediastinal mass lesion.  No pneumothorax.     No significant intrathoracic abnormality.  No significant detrimental interval change in the appearance of the chest since 01/25/2023 is appreciated. Electronically signed by: Americo Reyes MD Date:    02/09/2023 Time:    11:12    CT Head Without Contrast    Result Date: 3/9/2023  EXAMINATION: CT HEAD WITHOUT CONTRAST CLINICAL HISTORY: Mental status change, unknown cause; TECHNIQUE: Multiple sequential 5 mm axial images of the head without contrast.  Coronal and sagittal reformatted imaging from the axial acquisition. COMPARISON: 02/07/2023 FINDINGS: Study  is distorted by artifact from motion.  Subcentimeter lipoma along the interhemispheric fissure posteriorly stable Mild ill-defined decreased attenuation supratentorial white matter while nonspecific concerning for underlying chronic ischemic change.  Allowing for artifacts there is no evidence for acute intracranial hemorrhage or sulcal effacement.  The ventricles are normal in size without hydrocephalus.  There is no midline shift or mass effect.  Small lobular opacities maxillary antra with moderate opacification right ethmoid air cells.  Remaining visualized paranasal sinuses and mastoid air cells are clear.  Incidental probable cosmetic implants pre maxillary soft tissues bilaterally.     Allowing for slight motion artifact there is no definite acute intracranial findings specifically without evidence for acute intracranial hemorrhage or sulcal effacement to suggest large territory recent infarction Clinical correlation and further evaluation as warranted. Electronically signed by: Mikey Rodriguez DO Date:    03/09/2023 Time:    15:12    X-Ray Chest AP Portable    Result Date: 3/9/2023  EXAMINATION: XR CHEST AP PORTABLE CLINICAL HISTORY: Altered mental status; TECHNIQUE: Single frontal view of the chest was performed. COMPARISON: 02/16/2023 FINDINGS: Small lung volumes likely to poor inspiratory effort.  Reduced perihilar and ill-defined lung opacities which may represent resolving vascular congestion and edema.  There is no significant new lung opacity.  No large lung consolidation.  No large effusion or pneumothorax.  Further evaluation as warranted clinically.     Please see above Electronically signed by: Mikey Rodriguez DO Date:    03/09/2023 Time:    13:51    US Abdomen Limited    Result Date: 3/9/2023  EXAMINATION: US ABDOMEN LIMITED CLINICAL HISTORY: Hyperbilirubinemia; TECHNIQUE: Limited abdominal ultrasound. COMPARISON: 02/09/2023, CT 01/21/2021 FINDINGS: Liver: Normal in size, measuring 11.2 cm.  Homogeneous echotexture. No focal hepatic lesions. Gallbladder: Multiple echogenic stones are present.  The largest measures 1.2 cm.  Negative sonographic Tang sign.  Gallbladder is contracted with limited visualization.  Mild wall thickening is not excluded.  No pericholecystic fluid.  No wall hypervascularity.  The patient is unable to left lateral decubitus. Biliary system: The common duct is not dilated, measuring 5 mm.  No intrahepatic ductal dilatation. No acute abnormality of the inferior vena cava. The spleen measures 12.7 cm. Limited visualization of the pancreas which is obscured by gas. Miscellaneous: No upper abdominal ascites. Right pleural effusion is noted. No evidence of hydronephrosis of the right kidney on limited visualization. CT with contrast may better characterize.     1. Cholelithiasis. 2. Mild splenomegaly. 3. Right pleural effusion. Electronically signed by: Bora Roldan Date:    03/09/2023 Time:    16:29    US Abdomen Limited    Result Date: 2/9/2023  EXAMINATION: US ABDOMEN LIMITED CLINICAL HISTORY: r/o ascitis; TECHNIQUE: Limited ultrasound evaluation of the abdomen was performed to evaluate for ascites. COMPARISON: Abdominal ultrasound 01/25/2023.  CT abdomen pelvis 01/21/2021. FINDINGS: Limited sonographic evaluation of the all 4 abdominal quadrants demonstrates no significant ascites. Redemonstrated dilated varices within the right lower quadrant.     No significant ascites. Electronically signed by resident: Wilian Daily Date:    02/09/2023 Time:    10:15 Electronically signed by: Eriberto Holliday Date:    02/09/2023 Time:    10:49    Assessment:     Marely Hamilton is a 58 y.o. female with a PMHx of alcoholic cirrhosis, hepatic encephalopathy, seizure, and bipolar 1 disorder presenting with AMS for an unknown duration at this time. Pt was previously admitted here last month for AMS 2/2 hepatic encephalopathy. Pt will be admitted to [facility] IM for the evaluation and management of AMS.  "     Plan:     Hepatic encephalopathy  Likely given recent presentation, elevated NH3 55, and AMS only oriented to self.  Lactulose 4 times daily  Trending NH3 and CTM    Cirrhosis, Laennec's  From previous admission w/ MELD-Na & MELD scores: 16  MELD-Na score: (pending INR)  MELD score: (pending INR)  Holding home Lasix and Spironolactone given concern for dehydration and K>5 respecively  Continue Propranolol  Low-sodium diet     Bipolar 1 disorder, depressed, severe  Home Lithium and Abilify, w/ Seroquel for sleep per chart  Hold Lithium; pending Lithium levels  Continue Abilify  Holding home Seroquel; PRN melatonin and vistaril for sleep    Macrocytic anemia  Hx of Macrocytic anemia on previous admission  H&H this admit currently stable and wnl;   Supplemental Thiamine, Folate, B12    History of seizure  Continue home Keppra    Thrombocytopenia  Plt 110; improved from previous presentation and no signs or symptoms of bleeds at this time.  Trending CBC and CTM    Non-Anion Gap Metabolic Acidosis  HCO3 18, AG 8, Cl 114  Continue home Bicarb   Trending CMP/BMP and CTM    Ppx: SCD; PPI  Diet: Low Sodium  Code: Full    Disposition: pending resolution/improvement of AMS    Bang "Ramu Neal MD  [facility] Internal Medicine, PGY-1    [facility] Medicine Hospitalist Pager numbers:   [facility] Hospitalist Medicine Team A (Angela/Vianney): [phone]  [facility] Hospitalist Medicine Team B (Lyn/Edenilson):  [phone]    "

## 2023-03-10 LAB
ALBUMIN SERPL BCP-MCNC: 2.7 G/DL (ref 3.5–5.2)
ALLENS TEST: ABNORMAL
ALP SERPL-CCNC: 112 U/L (ref 55–135)
ALT SERPL W/O P-5'-P-CCNC: 19 U/L (ref 10–44)
ANION GAP SERPL CALC-SCNC: 9 MMOL/L (ref 8–16)
AST SERPL-CCNC: 41 U/L (ref 10–40)
BASOPHILS # BLD AUTO: 0.03 K/UL (ref 0–0.2)
BASOPHILS NFR BLD: 0.7 % (ref 0–1.9)
BILIRUB DIRECT SERPL-MCNC: 0.9 MG/DL (ref 0.1–0.3)
BILIRUB SERPL-MCNC: 4.5 MG/DL (ref 0.1–1)
BUN SERPL-MCNC: 15 MG/DL (ref 6–20)
CALCIUM SERPL-MCNC: 9 MG/DL (ref 8.7–10.5)
CHLORIDE SERPL-SCNC: 113 MMOL/L (ref 95–110)
CO2 SERPL-SCNC: 16 MMOL/L (ref 23–29)
CREAT SERPL-MCNC: 0.7 MG/DL (ref 0.5–1.4)
DELSYS: ABNORMAL
DIFFERENTIAL METHOD: ABNORMAL
EOSINOPHIL # BLD AUTO: 0.2 K/UL (ref 0–0.5)
EOSINOPHIL NFR BLD: 4.6 % (ref 0–8)
ERYTHROCYTE [DISTWIDTH] IN BLOOD BY AUTOMATED COUNT: 14.6 % (ref 11.5–14.5)
EST. GFR  (NO RACE VARIABLE): >60 ML/MIN/1.73 M^2
GLUCOSE SERPL-MCNC: 90 MG/DL (ref 70–110)
HCO3 UR-SCNC: 15.9 MMOL/L (ref 24–28)
HCT VFR BLD AUTO: 37.4 % (ref 37–48.5)
HGB BLD-MCNC: 12.3 G/DL (ref 12–16)
IMM GRANULOCYTES # BLD AUTO: 0.01 K/UL (ref 0–0.04)
IMM GRANULOCYTES NFR BLD AUTO: 0.2 % (ref 0–0.5)
LACTATE SERPL-SCNC: 2.2 MMOL/L (ref 0.5–2.2)
LYMPHOCYTES # BLD AUTO: 1.3 K/UL (ref 1–4.8)
LYMPHOCYTES NFR BLD: 30.8 % (ref 18–48)
MAGNESIUM SERPL-MCNC: 1.8 MG/DL (ref 1.6–2.6)
MCH RBC QN AUTO: 33.4 PG (ref 27–31)
MCHC RBC AUTO-ENTMCNC: 32.9 G/DL (ref 32–36)
MCV RBC AUTO: 102 FL (ref 82–98)
MONOCYTES # BLD AUTO: 0.3 K/UL (ref 0.3–1)
MONOCYTES NFR BLD: 7.6 % (ref 4–15)
NEUTROPHILS # BLD AUTO: 2.4 K/UL (ref 1.8–7.7)
NEUTROPHILS NFR BLD: 56.1 % (ref 38–73)
NRBC BLD-RTO: 0 /100 WBC
PCO2 BLDA: 25.3 MMHG (ref 35–45)
PH SMN: 7.41 [PH] (ref 7.35–7.45)
PHOSPHATE SERPL-MCNC: 3.3 MG/DL (ref 2.7–4.5)
PLATELET # BLD AUTO: 113 K/UL (ref 150–450)
PMV BLD AUTO: 10.8 FL (ref 9.2–12.9)
PO2 BLDA: 54 MMHG (ref 40–60)
POC BE: -9 MMOL/L
POC SATURATED O2: 89 % (ref 95–100)
POC TCO2: 17 MMOL/L (ref 24–29)
POTASSIUM SERPL-SCNC: 4.5 MMOL/L (ref 3.5–5.1)
PROT SERPL-MCNC: 6.4 G/DL (ref 6–8.4)
RBC # BLD AUTO: 3.68 M/UL (ref 4–5.4)
SAMPLE: ABNORMAL
SODIUM SERPL-SCNC: 138 MMOL/L (ref 136–145)
WBC # BLD AUTO: 4.35 K/UL (ref 3.9–12.7)

## 2023-03-10 PROCEDURE — 80053 COMPREHEN METABOLIC PANEL: CPT | Performed by: STUDENT IN AN ORGANIZED HEALTH CARE EDUCATION/TRAINING PROGRAM

## 2023-03-10 PROCEDURE — G0427 PR INPT TELEHEALTH CON 70/>M: ICD-10-PCS | Mod: 95,,, | Performed by: PSYCHIATRY & NEUROLOGY

## 2023-03-10 PROCEDURE — G0378 HOSPITAL OBSERVATION PER HR: HCPCS

## 2023-03-10 PROCEDURE — 83735 ASSAY OF MAGNESIUM: CPT | Performed by: STUDENT IN AN ORGANIZED HEALTH CARE EDUCATION/TRAINING PROGRAM

## 2023-03-10 PROCEDURE — 25000003 PHARM REV CODE 250: Performed by: STUDENT IN AN ORGANIZED HEALTH CARE EDUCATION/TRAINING PROGRAM

## 2023-03-10 PROCEDURE — 82803 BLOOD GASES ANY COMBINATION: CPT

## 2023-03-10 PROCEDURE — 63600175 PHARM REV CODE 636 W HCPCS: Performed by: STUDENT IN AN ORGANIZED HEALTH CARE EDUCATION/TRAINING PROGRAM

## 2023-03-10 PROCEDURE — 99900035 HC TECH TIME PER 15 MIN (STAT)

## 2023-03-10 PROCEDURE — 96366 THER/PROPH/DIAG IV INF ADDON: CPT

## 2023-03-10 PROCEDURE — 83605 ASSAY OF LACTIC ACID: CPT | Performed by: STUDENT IN AN ORGANIZED HEALTH CARE EDUCATION/TRAINING PROGRAM

## 2023-03-10 PROCEDURE — 94761 N-INVAS EAR/PLS OXIMETRY MLT: CPT

## 2023-03-10 PROCEDURE — 85025 COMPLETE CBC W/AUTO DIFF WBC: CPT | Performed by: STUDENT IN AN ORGANIZED HEALTH CARE EDUCATION/TRAINING PROGRAM

## 2023-03-10 PROCEDURE — 84425 ASSAY OF VITAMIN B-1: CPT

## 2023-03-10 PROCEDURE — 82248 BILIRUBIN DIRECT: CPT | Performed by: STUDENT IN AN ORGANIZED HEALTH CARE EDUCATION/TRAINING PROGRAM

## 2023-03-10 PROCEDURE — G0427 INPT/ED TELECONSULT70: HCPCS | Mod: 95,,, | Performed by: PSYCHIATRY & NEUROLOGY

## 2023-03-10 PROCEDURE — 84100 ASSAY OF PHOSPHORUS: CPT | Performed by: STUDENT IN AN ORGANIZED HEALTH CARE EDUCATION/TRAINING PROGRAM

## 2023-03-10 PROCEDURE — 25000003 PHARM REV CODE 250

## 2023-03-10 RX ORDER — SODIUM CHLORIDE 9 MG/ML
INJECTION, SOLUTION INTRAVENOUS CONTINUOUS
Status: DISCONTINUED | OUTPATIENT
Start: 2023-03-10 | End: 2023-03-13 | Stop reason: HOSPADM

## 2023-03-10 RX ADMIN — LEVETIRACETAM 1000 MG: 500 TABLET, FILM COATED ORAL at 08:03

## 2023-03-10 RX ADMIN — LACTULOSE 30 G: 20 SOLUTION ORAL at 06:03

## 2023-03-10 RX ADMIN — PANTOPRAZOLE SODIUM 40 MG: 40 TABLET, DELAYED RELEASE ORAL at 08:03

## 2023-03-10 RX ADMIN — PROPRANOLOL HYDROCHLORIDE 20 MG: 10 TABLET ORAL at 08:03

## 2023-03-10 RX ADMIN — RIFAXIMIN 550 MG: 550 TABLET ORAL at 09:03

## 2023-03-10 RX ADMIN — THIAMINE HYDROCHLORIDE 500 MG: 100 INJECTION, SOLUTION INTRAMUSCULAR; INTRAVENOUS at 01:03

## 2023-03-10 RX ADMIN — FOLIC ACID 1 MG: 1 TABLET ORAL at 08:03

## 2023-03-10 RX ADMIN — LEVETIRACETAM 1000 MG: 500 TABLET, FILM COATED ORAL at 09:03

## 2023-03-10 RX ADMIN — MELATONIN TAB 3 MG 6 MG: 3 TAB at 09:03

## 2023-03-10 RX ADMIN — RIFAXIMIN 550 MG: 550 TABLET ORAL at 11:03

## 2023-03-10 RX ADMIN — LACTULOSE 30 G: 20 SOLUTION ORAL at 11:03

## 2023-03-10 RX ADMIN — THIAMINE HYDROCHLORIDE 500 MG: 100 INJECTION, SOLUTION INTRAMUSCULAR; INTRAVENOUS at 10:03

## 2023-03-10 RX ADMIN — SODIUM BICARBONATE 1300 MG: 650 TABLET ORAL at 09:03

## 2023-03-10 RX ADMIN — SODIUM BICARBONATE 1300 MG: 650 TABLET ORAL at 08:03

## 2023-03-10 RX ADMIN — LACTULOSE 30 G: 20 SOLUTION ORAL at 01:03

## 2023-03-10 RX ADMIN — THIAMINE HYDROCHLORIDE 500 MG: 100 INJECTION, SOLUTION INTRAMUSCULAR; INTRAVENOUS at 08:03

## 2023-03-10 RX ADMIN — ARIPIPRAZOLE 10 MG: 5 TABLET ORAL at 09:03

## 2023-03-10 NOTE — PLAN OF CARE
VN cued into pt's room for introduction with pt's permission. Pt oriented to self and place. Pt answered admission questions appropriately.  VN role explained and informed pt that VN would be working with bedside nurse and the rest of the care team.  Fall risk and bed alarm protocol education provided.  Instructed pt to call for assistance and agreeable.  Allowed time for questions. NAD noted.  Will cont to be available as needed.      03/10/23 1709   Admission   Initial VN Admission Questions Complete   Communication Issues? None   Shift   Virtual Nurse - Patient Verbalized Approval Of Camera Use;VN Rounding   Safety/Activity   Patient Rounds call light in patient/parent reach;visualized patient   Safety Promotion/Fall Prevention Fall Risk reviewed with patient/family;instructed to call staff for mobility   Pain/Comfort/Sleep   Preferred Pain Scale number (Numeric Rating Pain Scale)   Pain Rating (0-10): Rest 0

## 2023-03-10 NOTE — ED NOTES
Pt tolerated PO water, no coughing or drooling; pt still disoriented x4 however follows commands, says thank you, says she does not remember name nor knows where she is even with reorientation; pt currently watching tv, sr x2, VSS, call light in reach.

## 2023-03-10 NOTE — PLAN OF CARE
"Collateral: Anastasia Hamilton (mother); [phone]  3/10/22; ~1345hrs  Last saw her Tuesday or Wednesday of this wk. Limited hx provided. Per Mother, pt told mother that she wanted to come into hospital due to feeling more confused and also had recent change in her medication (pt manages her own medication per mother; not aware of specifics). No further clarity provided about details leading to this admission at this time. Pt has home health set-up by sister to help w/ ADLs for pt and mother. Mother is okay w/ pt coming home.     Collateral: Zaria Hamilton (sister); [phone]  3/10/22; ~1410hrs; called w/o answer and unable to leave voice message.     Pending verification and clarity from sister about mother's collateral information.    Pt's outpatient psychiatrist is Dr Alfredito Coates and last had an appointment w/ her at the end of last month. Dr Coates has been notified of pt's current admission which pt was amendable to.    Bang Neal MD (Nak)  [facility] Internal Medicine, PGY-1    "

## 2023-03-10 NOTE — PLAN OF CARE
This SW met with pt at bedside to complete DCA assessment. SW noted pt now has assessment in from ED worker. This SWCM will not enter a new assessment and has confirmed information noted.       No future appointments.    SHAHEEN Braga  [facility]  [phone]

## 2023-03-10 NOTE — PLAN OF CARE
[facility] - Emergency Dept  Initial Discharge Assessment       Primary Care Provider: Viktor Ross MD    Admission Diagnosis: Altered mental status [R41.82]    Admission Date: 3/9/2023  Expected Discharge Date:   SW went to meet with patient today at bedside. Patient reports she lives at home with her mother (92 years old). She stated she may need a wheelchair. Pt stated she had walker and shower chair at home. She receives sitter services and home health from [facility]. Pt was AO x3 and was mildly confused through out assessment. SW would reframe questions; pt would answer appropriately. Pt will transport home through sitter services [phone]. SW requested follow up appointment.        Payor: [employer] MEDICARE / Plan: [employer] MEDICARE HMO / Product Type: Capitation /     Extended Emergency Contact Information  Primary Emergency Contact: Zaria Hamilton   [facility]  Home Phone: [phone]  Relation: Sister  Secondary Emergency Contact: Anastasia Hamilton   [place]  Mobile Phone: [phone]  Relation: Mother    Discharge Plan A: Home Health  Discharge Plan B: Home with family      [facility] - [place], [place] - [address]  [address]  [place] [address]  Phone: [phone] Fax: [phone]      Initial Assessment (most recent)       Adult Discharge Assessment - 03/10/23 1430          Discharge Assessment    Assessment Type Discharge Planning Assessment     Confirmed/corrected address, phone number and insurance Yes     Source of Information patient     Does patient/caregiver understand observation status Yes     People in Home parent(s)     Do you expect to return to your current living situation? Yes     Do you have help at home or someone to help you manage your care at home? Yes     Who are your caregiver(s) and their phone number(s)? [facility]     Prior to hospitilization cognitive status: Unable to  Assess     Walking or Climbing Stairs ambulation difficulty, requires equipment     Mobility Management Wheelchair, walker     Dressing/Bathing bathing difficulty, requires equipment     Dressing/Bathing Management Shower chair     Home Accessibility wheelchair accessible     Equipment Currently Used at Home shower chair;walker, rolling;cane, straight     Patient currently being followed by outpatient case management? Unable to determine (comments)   pt. would benifit    Do you currently have service(s) that help you manage your care at home? Yes     Name and Contact number of agency [facility]     Is the pt/caregiver preference to resume services with current agency Yes     Do you take prescription medications? Yes     Do you have prescription coverage? Yes     Coverage HUMANA     Do you have any problems affording any of your prescribed medications? TBD     Who is going to help you get home at discharge? [facility] sitter to drive mother     Are you on dialysis? No     Discharge Plan A Home Health     Discharge Plan B Home with family     DME Needed Upon Discharge  --   possible wheelchair    Discharge Plan discussed with: Patient                                     03/10/23 1430   Discharge Assessment   Assessment Type Discharge Planning Assessment   Confirmed/corrected address, phone number and insurance Yes   Source of Information patient   Does patient/caregiver understand observation status Yes   People in Home parent(s)   Do you expect to return to your current living situation? Yes   Do you have help at home or someone to help you manage your care at home? Yes   Who are your caregiver(s) and their phone number(s)? [facility] sitter   Prior to hospitilization cognitive status: Unable to Assess   Walking or Climbing Stairs ambulation difficulty, requires equipment   Mobility Management Wheelchair, walker   Dressing/Bathing bathing difficulty, requires equipment   Dressing/Bathing Management  Shower chair   Home Accessibility wheelchair accessible   Equipment Currently Used at Home shower chair;walker, rolling;cane, straight   Patient currently being followed by outpatient case management? Unable to determine (comments)  (pt. would benifit)   Do you currently have service(s) that help you manage your care at home? Yes   Name and Contact number of agency [facility]   Is the pt/caregiver preference to resume services with current agency Yes   Do you take prescription medications? Yes   Do you have prescription coverage? Yes   Coverage HU[employer]NA   Do you have any problems affording any of your prescribed medications? TBD   Who is going to help you get home at discharge? pca sitter to drive mother   Are you on dialysis? No   Discharge Plan A Home Health   Discharge Plan B Home with family   DME Needed Upon Discharge    (possible wheelchair)   Discharge Plan discussed with: Patient         Irena Johnson, [facility]  ED Social Work  [phone]

## 2023-03-10 NOTE — NURSING
Patient is awake, alert and oriented to self and place.  Pure wick applied and connected to wall suction.  Diaper in use.  Telemetry monitoring box applied in ED.  AvaSys placed in room and patient is instructed on use of camera.  Verbalized understanding.  On seizure precautions and side rails are padded.  Safety is maintained with bed low, wheels locked and side rails up.  Call light within reach.  Bed alarm on.  Will continue to monitor.  Bruises noted to left hand.

## 2023-03-10 NOTE — CONSULTS
Please see telepsychiatry consultation note from this afternoon by Dr. Robert Calhoun.  Thank you.      Alfredito Aguayo MD  [facility]   03/10/2023

## 2023-03-10 NOTE — PROGRESS NOTES
"[facility] Medicine Progress Note    Primary Team: [facility] Hospitalist Team B  Attending Physician: Andre Nicholson MD  Resident: Nathaly Rutledge MD  Intern: Bang Neal MD (Nak)    Hospital Day: 0 days    Chief Complaint: AMS    Subjective:   NAEON. Overnight, pt was unable to urinate, had >400 cc of urine via bladder scan, and was straight cath'd. Afebrile w/ stable vitals on room air. Patient seen and examined by me this morning. Pt improved and oriented to self and location however not to situation or HPI for this admission. Pt denied any abdominal pains, SOB, chest pains, HA, dizziness, numbness, weakness, fevers, or chills    Review of Systems   All other systems reviewed and are negative.      Objective:   Last 24 Hour Vital Signs:  BP  Min: 117/66  Max: 179/75  Temp  Av.8 °F (37.1 °C)  Min: 98.8 °F (37.1 °C)  Max: 98.8 °F (37.1 °C)  Pulse  Av.8  Min: 53  Max: 68  Resp  Avg: 10.7  Min: 9  Max: 18  SpO2  Av.8 %  Min: 93 %  Max: 100 %  Height  Av' 3" (160 cm)  Min: 5' 3" (160 cm)  Max: 5' 3" (160 cm)  Weight  Av.7 kg (103 lb)  Min: 46.7 kg (103 lb)  Max: 46.7 kg (103 lb)    Intake/Output Summary (Last 24 hours) at 3/10/2023 0720  Last data filed at 3/9/2023 2042  Gross per 24 hour   Intake 100 ml   Output --   Net 100 ml        Physical Examination:  Physical Exam  HENT:      Head: Normocephalic and atraumatic.      Nose: Nose normal.      Mouth/Throat:      Mouth: Mucous membranes are moist.      Pharynx: Oropharynx is clear.   Eyes:      General: No scleral icterus.     Pupils: Pupils are equal, round, and reactive to light.   Cardiovascular:      Rate and Rhythm: Normal rate and regular rhythm.      Pulses: Normal pulses.      Heart sounds: Normal heart sounds.   Pulmonary:      Effort: Pulmonary effort is normal.      Breath sounds: Normal breath sounds.   Abdominal:      General: Abdomen is flat. Bowel sounds are normal.      Palpations: Abdomen is soft.      Tenderness: There is " no abdominal tenderness.   Musculoskeletal:      Right lower leg: No edema.      Left lower leg: No edema.   Skin:     General: Skin is warm and dry.      Capillary Refill: Capillary refill takes less than 2 seconds.   Neurological:      General: No focal deficit present.      Mental Status: She is alert. She is disoriented.      Comments: Disoriented but improved; neg asterixis   Psychiatric:         Mood and Affect: Mood normal.         Behavior: Behavior normal.         Thought Content: Thought content normal.      Laboratory:  Recent Labs   Lab 03/09/23  1323 03/10/23  0635   WBC 4.45 4.35   HGB 13.2 12.3   HCT 41.3 37.4   * 113*   * 102*   RDW 15.4* 14.6*    138   K 5.1 4.5   * 113*   CO2 18* 16*   BUN 14 15   CREATININE 0.7 0.7   GLU 91 90   PROT 7.2 6.4   ALBUMIN 3.1* 2.7*   BILITOT 4.5* 4.5*   AST 41* 41*   ALKPHOS 140* 112   ALT 22 19     Microbiology Results (last 7 days)       ** No results found for the last 168 hours. **          I have personally reviewed the above laboratory studies.     Imaging:  EKG (my interpretation): sinus adan w/ abnormal EKG changes from previous.    X-Ray Chest 1 View    Result Date: 2/16/2023  EXAMINATION: XR CHEST 1 VIEW CLINICAL HISTORY: infectious w/u; TECHNIQUE: Single frontal view of the chest was performed. COMPARISON: 02/09/2023 FINDINGS: Less complete inspiration, rotate chest left.  Heart top normal size.  Mild generalized prominence of interstitial markings with overall remote appearance.  Chronic pleuroparenchymal change left cardiophrenic angle fat pad.     Chronic interstitial fibrotic change.  No pneumonia or other acute change. Electronically signed by: Darien Bernard MD Date:    02/16/2023 Time:    16:16    X-Ray Chest 1 View    Result Date: 2/9/2023  EXAMINATION: XR CHEST 1 VIEW CLINICAL HISTORY: r/o pneumonia; TECHNIQUE: One view COMPARISON: Comparison is made to 01/25/2023. FINDINGS: Heart size is normal, as is the appearance  of the pulmonary vascularity.  Lung zones are clear, and are free of significant airspace consolidation or volume loss.  No pleural fluid.  No hilar or mediastinal mass lesion.  No pneumothorax.     No significant intrathoracic abnormality.  No significant detrimental interval change in the appearance of the chest since 01/25/2023 is appreciated. Electronically signed by: Americo Reyes MD Date:    02/09/2023 Time:    11:12    CT Head Without Contrast    Result Date: 3/9/2023  EXAMINATION: CT HEAD WITHOUT CONTRAST CLINICAL HISTORY: Mental status change, unknown cause; TECHNIQUE: Multiple sequential 5 mm axial images of the head without contrast.  Coronal and sagittal reformatted imaging from the axial acquisition. COMPARISON: 02/07/2023 FINDINGS: Study is distorted by artifact from motion.  Subcentimeter lipoma along the interhemispheric fissure posteriorly stable Mild ill-defined decreased attenuation supratentorial white matter while nonspecific concerning for underlying chronic ischemic change.  Allowing for artifacts there is no evidence for acute intracranial hemorrhage or sulcal effacement.  The ventricles are normal in size without hydrocephalus.  There is no midline shift or mass effect.  Small lobular opacities maxillary antra with moderate opacification right ethmoid air cells.  Remaining visualized paranasal sinuses and mastoid air cells are clear.  Incidental probable cosmetic implants pre maxillary soft tissues bilaterally.     Allowing for slight motion artifact there is no definite acute intracranial findings specifically without evidence for acute intracranial hemorrhage or sulcal effacement to suggest large territory recent infarction Clinical correlation and further evaluation as warranted. Electronically signed by: Mikey Rodriguez DO Date:    03/09/2023 Time:    15:12    X-Ray Chest AP Portable    Result Date: 3/9/2023  EXAMINATION: XR CHEST AP PORTABLE CLINICAL HISTORY: Altered mental status;  TECHNIQUE: Single frontal view of the chest was performed. COMPARISON: 02/16/2023 FINDINGS: Small lung volumes likely to poor inspiratory effort.  Reduced perihilar and ill-defined lung opacities which may represent resolving vascular congestion and edema.  There is no significant new lung opacity.  No large lung consolidation.  No large effusion or pneumothorax.  Further evaluation as warranted clinically.     Please see above Electronically signed by: Mikey Rodriguez DO Date:    03/09/2023 Time:    13:51    US Abdomen Limited    Result Date: 3/9/2023  EXAMINATION: US ABDOMEN LIMITED CLINICAL HISTORY: Hyperbilirubinemia; TECHNIQUE: Limited abdominal ultrasound. COMPARISON: 02/09/2023, CT 01/21/2021 FINDINGS: Liver: Normal in size, measuring 11.2 cm. Homogeneous echotexture. No focal hepatic lesions. Gallbladder: Multiple echogenic stones are present.  The largest measures 1.2 cm.  Negative sonographic Tang sign.  Gallbladder is contracted with limited visualization.  Mild wall thickening is not excluded.  No pericholecystic fluid.  No wall hypervascularity.  The patient is unable to left lateral decubitus. Biliary system: The common duct is not dilated, measuring 5 mm.  No intrahepatic ductal dilatation. No acute abnormality of the inferior vena cava. The spleen measures 12.7 cm. Limited visualization of the pancreas which is obscured by gas. Miscellaneous: No upper abdominal ascites. Right pleural effusion is noted. No evidence of hydronephrosis of the right kidney on limited visualization. CT with contrast may better characterize.     1. Cholelithiasis. 2. Mild splenomegaly. 3. Right pleural effusion. Electronically signed by: Bora Roldan Date:    03/09/2023 Time:    16:29    US Abdomen Limited    Result Date: 2/9/2023  EXAMINATION: US ABDOMEN LIMITED CLINICAL HISTORY: r/o ascitis; TECHNIQUE: Limited ultrasound evaluation of the abdomen was performed to evaluate for ascites. COMPARISON: Abdominal ultrasound  01/25/2023.  CT abdomen pelvis 01/21/2021. FINDINGS: Limited sonographic evaluation of the all 4 abdominal quadrants demonstrates no significant ascites. Redemonstrated dilated varices within the right lower quadrant.     No significant ascites. Electronically signed by resident: Wilian Daily Date:    02/09/2023 Time:    10:15 Electronically signed by: Eriberto Holliday Date:    02/09/2023 Time:    10:49    Current Medications:     Scheduled:   ARIPiprazole  10 mg Oral Nightly    folic acid  1 mg Oral Daily    lactulose  30 g Oral Q6H    levETIRAcetam  1,000 mg Oral BID    pantoprazole  40 mg Oral Daily    propranoloL  20 mg Oral Daily    sodium bicarbonate  1,300 mg Oral BID    thiamine (VITAMIN B1) IVPB  500 mg Intravenous TID    Followed by    [START ON 3/12/2023] thiamine (VITAMIN B1) IVPB  250 mg Intravenous Daily         Infusions:       PRN:  dextrose 10%, dextrose 10%, dextrose, dextrose, glucagon (human recombinant), hydrOXYzine pamoate, melatonin, naloxone, sodium chloride 0.9%    Antibiotics and Day Number of Therapy:  Antibiotics (From admission, onward)      None           Assessment:     Marely Hamilton is a 58 y.o. female with a PMHx of alcoholic cirrhosis, hepatic encephalopathy, seizure, and bipolar 1 disorder presenting with AMS for an unknown duration at this time. Pt was previously admitted here last month for AMS 2/2 hepatic encephalopathy. Pt will be admitted to [facility] for the evaluation and management of AMS.     Plan:     Hepatic encephalopathy  Likely given recent presentation, elevated NH3 55, and AMS only oriented to self.  Lactulose 4 times daily  Trending NH3 and CTM  Bladder scan q6hrs  Pending UDS  Pending Urine Na, K, Cl     Cirrhosis, Laennec's  From previous admission w/ MELD-Na & MELD scores: 16  INR 1.6; PT 16; aPTT 33.9  MELD-Na score: 17  MELD score: 17  Holding home Lasix and Spironolactone given concern for dehydration and K>5 respecively  Continue Propranolol  Low-sodium diet    "  Bipolar 1 disorder, depressed, severe  Home Lithium and Abilify, w/ Seroquel for sleep per chart  Low Lithium levels 0.2  Continue Abilify  Holding home Lithium and Seroquel; PRN melatonin and vistaril for sleep  Currently sees Dr Coates at [facility] for outpatient psychiatry  Consulted Psych for med management  Pending; appreciate recs     Macrocytic anemia  Hx of Macrocytic anemia on previous admission  H&H this admit currently stable and wnl;   On 2/10/23, B12 944 (wnl) & Folate 15.1 (wnl)  Pending Thiamine lab  Supplemental Thiamine, Folate, B12    Gallstones  U/S Abd showed multiple echogenic stones w/o evidence of obstruction at this time.  AST/ALT stable; ALP trending down  H&H stable w/ stable vitals on room air  CTM     History of seizure  Continue home Keppra     Thrombocytopenia  Plt 110; improved from previous presentation and no signs or symptoms of bleeds at this time  Trending CBC and CTM     Non-Anion Gap Metabolic Acidosis  HCO3 18, AG 8, Cl 114  Continue home Bicarb   Trending CMP/BMP and CTM    Ppx: SCD; PPI  Diet: Low Sodium  Code: Full    Disposition: pending resolution/improvement of AMS; collaterals for HPI and discharge planning    Bang Neal MD (Nak)  [facility], PGY-1    [facility] Hospitalist Pager numbers:   [facility] Hospitalist Medicine Team A (Angela/Vianney): [phone]  [facility] Hospitalist Medicine Team B (Lyn/Edenilson):  [phone]     "

## 2023-03-10 NOTE — PHARMACY MED REC
"    [facility]           Pharmacy  Admission Medication History     The home medication history was taken by Jovita Rangel.      Medication history obtained from Medications listed below were obtained from: [employer].     Based on information gathered for medication list, you may go to "Admission" then "Reconcile Home Medications" tabs to review and/or act upon those items.     The home medication list has been updated by the Pharmacy department.   Please read ALL comments highlighted in yellow.   Please address this information as you see fit.    Feel free to contact us if you have any questions or require assistance.        No current facility-administered medications on file prior to encounter.     Current Outpatient Medications on File Prior to Encounter   Medication Sig Dispense Refill    folic acid (FOLVITE) 1 MG tablet Take 1 tablet (1 mg total) by mouth once daily. 30 tablet 2    levETIRAcetam (KEPPRA) 500 MG Tab Take 2 tablets (1,000 mg total) by mouth 2 (two) times daily. 60 tablet 3    pantoprazole (PROTONIX) 40 MG tablet Take 40 mg by mouth once daily.      propranoloL (INDERAL) 20 MG tablet Take 20 mg by mouth once daily.      QUEtiapine (SEROQUEL) 100 MG Tab Take 100 mg by mouth every evening.      spironolactone (ALDACTONE) 25 MG tablet Take 25 mg by mouth once daily.      zonisamide (ZONEGRAN) 100 MG Cap Take 100 mg by mouth once daily.      ARIPiprazole (ABILIFY) 10 MG Tab Take 10 mg by mouth nightly.      furosemide (LASIX) 20 MG tablet Take 0.5 tablets (10 mg total) by mouth 2 (two) times daily. HOLD UNTIL OTHERWISE DIRECTED BY PRIMARY CARE PROVIDER 30 tablet 1    hydrOXYzine (ATARAX) 50 MG tablet Take 50 mg by mouth every evening.      lactulose (CHRONULAC) 20 gram/30 mL Soln Take 45 mLs (30 g total) by mouth 3 (three) times daily. 4050 mL 0    lithium (LITHOTAB) 300 mg tablet Take 300 mg by mouth nightly.      multivitamin Tab Take 1 tablet by mouth once " daily.      sodium bicarbonate 650 MG tablet Take 2 tablets (1,300 mg total) by mouth 2 (two) times daily. 120 tablet 11    thiamine 100 MG tablet Take 1 tablet (100 mg total) by mouth once daily. 30 tablet 2       Please address this information as you see fit.  Feel free to contact us if you have any questions or require assistance.    Jovita Rangel  [phone]              .

## 2023-03-10 NOTE — CONSULTS
"  Consults  Consult Start Time: 03/10/2023 13:00 CST  Consult End Time: 03/10/2023 14:10 CST      Inpatient Telepsychiatry Consult    The chief complaint leading to psychiatric consultation is: encephalopathy  This consultation is from the patient's treating physician Dr. Nathaly Harvey  Start time of consultation 1:00 pm      Patient Identification:  Marely Hamilton is a 58 y.o. female.    Patient information was obtained from patient.    History of Present Illness:    From H&P from yesterday:  "Marely Hamilton is a 58 y.o. female with a PMHx of alcoholic cirrhosis, hepatic encephalopathy, seizure, bipolar 1 disorder. The patient presented on 3/9/2023 for evaluation of AMS. Work-up in the ED notable for elevated NH3, elevated ALP, and U/S abdomen concerning for cholelithiasis w/ R pleural effusion. Ethanol levels and U/A were unremarkable, as well as CTH showed no acute findings. Per ED, pt was given lactulose (not charted at the time this note was created). Of note, pt was previously admitted here last month for AMS 2/2 hepatic encephalopathy.  Pt seen at bedside. Pt provided limited hx given only oriented to self but not oriented to location, situation, or time. Unable to provide HPI or ROS but stated living w/ her mother. Pt was calm, cooperative, and interactive during interview. Pt did not voice any complaints at this time. Pt will be admitted to [facility] for the evaluation and management of AMS."    On interview by me today:  Remembers her  with some difficulty. Does not know day of the week, does not know the month or the year.  Denies SI/HI/AH's/paranoid feelings.  Denies alcohol or drug use.  Lives with mother. Mother organizes medication.  Not in a relationship.  No child.  Not working.    Sister Zaria [phone]: when pt. Is at her baseline pt. Is not confused; h/o crystal[becomes psychotic], h/o depression[attempted suicide years ago via Tylenol overdose]; no alcohol for years, no " drug    Psychiatrist Dr. Coates [phone]: unable to reach    Mother Anastasia [phone]: unable to reach    Psychiatric History:   Please see psychiatry consult from 01/26/23.    Past Medical History:   Past Medical History:   Diagnosis Date    Addiction to drug     Alcohol abuse     Alcohol abuse, in remission 6/15/2015    14.5 weeks ago; AA weekly    Anemia     Anxiety 6/15/2015    Behavioral problem     Bipolar disorder     Bipolar disorder in remission 6/15/2015    Cirrhosis, Laennec's 6/15/2015    Depression     Encounter for blood transfusion     Epistaxis 6/15/2015    Fatigue     Glaucoma     Hematuria     Hepatic encephalopathy 6/15/2015    Hepatic enlargement     History of psychiatric care     History of psychiatric hospitalization     History of seizure 6/15/2015    1    hx of intentional Tylenol overdose 6/15/2015    2005- situational and hx of bipolar    Hx of psychiatric care     Macrocytic anemia 9/18/2015    6 units PRBC since June 2015    Jeana     Osteoarthritis     Other ascites 6/15/2015    Psychiatric exam requested by authority     Psychiatric problem     Psychosis 9/26/2019    Renal disorder     Seizures     Self-harming behavior     Suicide attempt     Therapy     Thrombocytopenia 6/15/2015     Allergies:   Review of patient's allergies indicates:   Allergen Reactions    Sulfa (sulfonamide antibiotics) Rash    Codeine Nausea And Vomiting     Medications:  Scheduled Meds:    ARIPiprazole  10 mg Oral Nightly    folic acid  1 mg Oral Daily    lactulose  30 g Oral Q6H    levETIRAcetam  1,000 mg Oral BID    pantoprazole  40 mg Oral Daily    propranoloL  20 mg Oral Daily    rifAXImin  550 mg Oral BID    sodium bicarbonate  1,300 mg Oral BID    thiamine (VITAMIN B1) IVPB  500 mg Intravenous TID    Followed by    [START ON 3/12/2023] thiamine (VITAMIN B1) IVPB  250 mg Intravenous Daily      PRN Meds: dextrose 10%, dextrose 10%, dextrose, dextrose, glucagon (human recombinant), hydrOXYzine pamoate,  "melatonin, naloxone, sodium chloride 0.9%   Psychotherapeutics (From admission, onward)      Start     Stop Route Frequency Ordered    03/09/23 2100  ARIPiprazole tablet 10 mg         -- Oral Nightly 03/09/23 1829          Family History:   Family History   Problem Relation Age of Onset    Alcohol abuse Father     Suicide Father     Bipolar disorder Father     Alcohol abuse Sister     Hypertension Mother     Alcohol abuse Paternal Grandfather     Drug abuse Neg Hx     Dementia Neg Hx      Legal History:   Pending charges: denies    Social History:   Voodoo: believes in God   History: denies  Access to Gun: yes     Current Evaluation:     Constitutional  Vitals:  Vitals:    03/09/23 1902 03/09/23 2002 03/09/23 2102 03/09/23 2202   BP: 125/65 121/67 128/66 131/67   Pulse: 64 64 68 66   Resp: 12 10     Temp:       SpO2: 98% 99% 96% 99%   Weight:       Height:        03/09/23 2203 03/09/23 2302 03/10/23 0002 03/10/23 0102   BP:   117/66 121/67   Pulse:  65 66 65   Resp: 10 10 10    Temp:       SpO2:  97% 97% 98%   Weight:       Height:        03/10/23 0107 03/10/23 0128 03/10/23 0202 03/10/23 0302   BP:       Pulse:  64 65 65   Resp: 10 (!) 9 10 10   Temp:       SpO2:  97% 97% 98%   Weight:       Height:        03/10/23 0402 03/10/23 0502 03/10/23 0732   BP: 122/65  120/71   Pulse: 66 68 66   Resp: 10 10 12   Temp:      SpO2: 98% 95% 96%   Weight:      Height:         General:  Appears stated age     Psychiatric  Level of Consciousness: alert  Orientation: please see above  Psychomotor Behavior: calm  Speech: normal r/r/v  Language: normal use of words  Mood: "pretty good"  Affect: appropriate  Thought Process: logical  Associations: intact  Thought Content: denies SI/HI  Attention: intact for interview  Insight: appears fair  Judgement: appears fair    Laboratory Data:   Recent Results (from the past 36 hour(s))   CBC auto differential    Collection Time: 03/09/23  1:23 PM   Result Value Ref Range    WBC 4.45 " 3.90 - 12.70 K/uL    RBC 3.99 (L) 4.00 - 5.40 M/uL    Hemoglobin 13.2 12.0 - 16.0 g/dL    Hematocrit 41.3 37.0 - 48.5 %     (H) 82 - 98 fL    MCH 33.1 (H) 27.0 - 31.0 pg    MCHC 32.0 32.0 - 36.0 g/dL    RDW 15.4 (H) 11.5 - 14.5 %    Platelets 110 (L) 150 - 450 K/uL    MPV 11.1 9.2 - 12.9 fL    Immature Granulocytes 0.0 0.0 - 0.5 %    Gran # (ANC) 2.7 1.8 - 7.7 K/uL    Immature Grans (Abs) 0.00 0.00 - 0.04 K/uL    Lymph # 1.3 1.0 - 4.8 K/uL    Mono # 0.4 0.3 - 1.0 K/uL    Eos # 0.1 0.0 - 0.5 K/uL    Baso # 0.02 0.00 - 0.20 K/uL    nRBC 0 0 /100 WBC    Gran % 59.6 38.0 - 73.0 %    Lymph % 28.1 18.0 - 48.0 %    Mono % 8.8 4.0 - 15.0 %    Eosinophil % 3.1 0.0 - 8.0 %    Basophil % 0.4 0.0 - 1.9 %    Differential Method Automated    Comprehensive metabolic panel    Collection Time: 03/09/23  1:23 PM   Result Value Ref Range    Sodium 140 136 - 145 mmol/L    Potassium 5.1 3.5 - 5.1 mmol/L    Chloride 114 (H) 95 - 110 mmol/L    CO2 18 (L) 23 - 29 mmol/L    Glucose 91 70 - 110 mg/dL    BUN 14 6 - 20 mg/dL    Creatinine 0.7 0.5 - 1.4 mg/dL    Calcium 9.7 8.7 - 10.5 mg/dL    Total Protein 7.2 6.0 - 8.4 g/dL    Albumin 3.1 (L) 3.5 - 5.2 g/dL    Total Bilirubin 4.5 (H) 0.1 - 1.0 mg/dL    Alkaline Phosphatase 140 (H) 55 - 135 U/L    AST 41 (H) 10 - 40 U/L    ALT 22 10 - 44 U/L    Anion Gap 8 8 - 16 mmol/L    eGFR >60 >60 mL/min/1.73 m^2   Ethanol    Collection Time: 03/09/23  1:23 PM   Result Value Ref Range    Alcohol, Serum <10 <10 mg/dL   Lipase    Collection Time: 03/09/23  1:23 PM   Result Value Ref Range    Lipase 43 4 - 60 U/L   Ammonia    Collection Time: 03/09/23  1:23 PM   Result Value Ref Range    Ammonia 55 (H) 10 - 50 umol/L   Magnesium    Collection Time: 03/09/23  1:23 PM   Result Value Ref Range    Magnesium 2.0 1.6 - 2.6 mg/dL   Urinalysis - Cath.    Collection Time: 03/09/23  2:53 PM   Result Value Ref Range    Specimen UA Urine, Clean Catch     Color, UA Yellow Yellow, Straw, Sarah    Appearance, UA  Clear Clear    pH, UA 7.0 5.0 - 8.0    Specific Gravity, UA 1.020 1.005 - 1.030    Protein, UA Negative Negative    Glucose, UA Negative Negative    Ketones, UA Negative Negative    Bilirubin (UA) Negative Negative    Occult Blood UA Negative Negative    Nitrite, UA Negative Negative    Urobilinogen, UA Negative <2.0 EU/dL    Leukocytes, UA Negative Negative   Lithium level    Collection Time: 03/09/23  7:05 PM   Result Value Ref Range    Lithium Level 0.2 (L) 0.6 - 1.2 mmol/L   Protime-INR    Collection Time: 03/09/23  7:06 PM   Result Value Ref Range    Prothrombin Time 16.0 (H) 9.0 - 12.5 sec    INR 1.6 (H) 0.8 - 1.2   APTT    Collection Time: 03/09/23  7:06 PM   Result Value Ref Range    aPTT 33.9 (H) 21.0 - 32.0 sec   Comprehensive Metabolic Panel (CMP)    Collection Time: 03/10/23  6:35 AM   Result Value Ref Range    Sodium 138 136 - 145 mmol/L    Potassium 4.5 3.5 - 5.1 mmol/L    Chloride 113 (H) 95 - 110 mmol/L    CO2 16 (L) 23 - 29 mmol/L    Glucose 90 70 - 110 mg/dL    BUN 15 6 - 20 mg/dL    Creatinine 0.7 0.5 - 1.4 mg/dL    Calcium 9.0 8.7 - 10.5 mg/dL    Total Protein 6.4 6.0 - 8.4 g/dL    Albumin 2.7 (L) 3.5 - 5.2 g/dL    Total Bilirubin 4.5 (H) 0.1 - 1.0 mg/dL    Alkaline Phosphatase 112 55 - 135 U/L    AST 41 (H) 10 - 40 U/L    ALT 19 10 - 44 U/L    Anion Gap 9 8 - 16 mmol/L    eGFR >60 >60 mL/min/1.73 m^2   Magnesium    Collection Time: 03/10/23  6:35 AM   Result Value Ref Range    Magnesium 1.8 1.6 - 2.6 mg/dL   Phosphorus    Collection Time: 03/10/23  6:35 AM   Result Value Ref Range    Phosphorus 3.3 2.7 - 4.5 mg/dL   CBC with Automated Differential    Collection Time: 03/10/23  6:35 AM   Result Value Ref Range    WBC 4.35 3.90 - 12.70 K/uL    RBC 3.68 (L) 4.00 - 5.40 M/uL    Hemoglobin 12.3 12.0 - 16.0 g/dL    Hematocrit 37.4 37.0 - 48.5 %     (H) 82 - 98 fL    MCH 33.4 (H) 27.0 - 31.0 pg    MCHC 32.9 32.0 - 36.0 g/dL    RDW 14.6 (H) 11.5 - 14.5 %    Platelets 113 (L) 150 - 450 K/uL    MPV  10.8 9.2 - 12.9 fL    Immature Granulocytes 0.2 0.0 - 0.5 %    Gran # (ANC) 2.4 1.8 - 7.7 K/uL    Immature Grans (Abs) 0.01 0.00 - 0.04 K/uL    Lymph # 1.3 1.0 - 4.8 K/uL    Mono # 0.3 0.3 - 1.0 K/uL    Eos # 0.2 0.0 - 0.5 K/uL    Baso # 0.03 0.00 - 0.20 K/uL    nRBC 0 0 /100 WBC    Gran % 56.1 38.0 - 73.0 %    Lymph % 30.8 18.0 - 48.0 %    Mono % 7.6 4.0 - 15.0 %    Eosinophil % 4.6 0.0 - 8.0 %    Basophil % 0.7 0.0 - 1.9 %    Differential Method Automated    ISTAT PROCEDURE    Collection Time: 03/10/23  8:17 AM   Result Value Ref Range    POC PH 7.405 7.35 - 7.45    POC PCO2 25.3 (L) 35 - 45 mmHg    POC PO2 54 40 - 60 mmHg    POC HCO3 15.9 (L) 24 - 28 mmol/L    POC BE -9 -2 to 2 mmol/L    POC SATURATED O2 89 (L) 95 - 100 %    POC TCO2 17 (L) 24 - 29 mmol/L    Sample VENOUS     Allens Test N/A     DelSys Room Air    Lactic acid, plasma    Collection Time: 03/10/23  8:42 AM   Result Value Ref Range    Lactate (Lactic Acid) 2.2 0.5 - 2.2 mmol/L   Bilirubin, direct    Collection Time: 03/10/23  8:42 AM   Result Value Ref Range    Bilirubin, Direct 0.9 (H) 0.1 - 0.3 mg/dL        Assessment - Diagnosis - Goals:     Impression:   Encephalopathy  Today , Platelets 113K, Total bili 4.5; Yesterday INR 1.6, Lithium level 0.2, Ammonia 55    Recommendations:   - can continue Abilify 10 mg at bedtime  - due to encephalopathy would not give Lithium or Seroquel for now  - would discontinue PRN Hydroxyzine  - Zyprexa 5 mg PO/IM Q8H PRN for agitation  - follow EKG/QTc if pt. Receives Zyprexa  - obtain psych f/u prn    Total time, including chart review, time with patient, obtaining collateral info[if possible]: 70 min

## 2023-03-10 NOTE — ED NOTES
Pt unable to urinate, stating she feels like she doesn't have to go. No urinary output noted for shift with this RN. Bladder scan performed, >400 in bladder at this time. MD paged for straight cath orders.

## 2023-03-11 LAB
ALBUMIN SERPL BCP-MCNC: 2.7 G/DL (ref 3.5–5.2)
ALP SERPL-CCNC: 119 U/L (ref 55–135)
ALT SERPL W/O P-5'-P-CCNC: 19 U/L (ref 10–44)
ANION GAP SERPL CALC-SCNC: 7 MMOL/L (ref 8–16)
AST SERPL-CCNC: 35 U/L (ref 10–40)
BASOPHILS # BLD AUTO: 0.03 K/UL (ref 0–0.2)
BASOPHILS NFR BLD: 0.5 % (ref 0–1.9)
BILIRUB SERPL-MCNC: 3.5 MG/DL (ref 0.1–1)
BUN SERPL-MCNC: 11 MG/DL (ref 6–20)
CALCIUM SERPL-MCNC: 8.7 MG/DL (ref 8.7–10.5)
CHLORIDE SERPL-SCNC: 116 MMOL/L (ref 95–110)
CO2 SERPL-SCNC: 15 MMOL/L (ref 23–29)
CREAT SERPL-MCNC: 0.7 MG/DL (ref 0.5–1.4)
DIFFERENTIAL METHOD: ABNORMAL
EOSINOPHIL # BLD AUTO: 0.1 K/UL (ref 0–0.5)
EOSINOPHIL NFR BLD: 2.3 % (ref 0–8)
ERYTHROCYTE [DISTWIDTH] IN BLOOD BY AUTOMATED COUNT: 14.8 % (ref 11.5–14.5)
EST. GFR  (NO RACE VARIABLE): >60 ML/MIN/1.73 M^2
GLUCOSE SERPL-MCNC: 128 MG/DL (ref 70–110)
HCT VFR BLD AUTO: 38.7 % (ref 37–48.5)
HGB BLD-MCNC: 12.8 G/DL (ref 12–16)
IMM GRANULOCYTES # BLD AUTO: 0.02 K/UL (ref 0–0.04)
IMM GRANULOCYTES NFR BLD AUTO: 0.3 % (ref 0–0.5)
LYMPHOCYTES # BLD AUTO: 1.2 K/UL (ref 1–4.8)
LYMPHOCYTES NFR BLD: 18.8 % (ref 18–48)
MAGNESIUM SERPL-MCNC: 1.6 MG/DL (ref 1.6–2.6)
MCH RBC QN AUTO: 33.1 PG (ref 27–31)
MCHC RBC AUTO-ENTMCNC: 33.1 G/DL (ref 32–36)
MCV RBC AUTO: 100 FL (ref 82–98)
MONOCYTES # BLD AUTO: 0.9 K/UL (ref 0.3–1)
MONOCYTES NFR BLD: 14.3 % (ref 4–15)
NEUTROPHILS # BLD AUTO: 4 K/UL (ref 1.8–7.7)
NEUTROPHILS NFR BLD: 63.8 % (ref 38–73)
NRBC BLD-RTO: 0 /100 WBC
PHOSPHATE SERPL-MCNC: 2.2 MG/DL (ref 2.7–4.5)
PLATELET # BLD AUTO: 105 K/UL (ref 150–450)
PMV BLD AUTO: 9.6 FL (ref 9.2–12.9)
POTASSIUM SERPL-SCNC: 4 MMOL/L (ref 3.5–5.1)
PROT SERPL-MCNC: 6.2 G/DL (ref 6–8.4)
RBC # BLD AUTO: 3.87 M/UL (ref 4–5.4)
SODIUM SERPL-SCNC: 138 MMOL/L (ref 136–145)
WBC # BLD AUTO: 6.22 K/UL (ref 3.9–12.7)

## 2023-03-11 PROCEDURE — 80053 COMPREHEN METABOLIC PANEL: CPT | Performed by: STUDENT IN AN ORGANIZED HEALTH CARE EDUCATION/TRAINING PROGRAM

## 2023-03-11 PROCEDURE — 85025 COMPLETE CBC W/AUTO DIFF WBC: CPT | Performed by: INTERNAL MEDICINE

## 2023-03-11 PROCEDURE — 84100 ASSAY OF PHOSPHORUS: CPT | Performed by: STUDENT IN AN ORGANIZED HEALTH CARE EDUCATION/TRAINING PROGRAM

## 2023-03-11 PROCEDURE — 63600175 PHARM REV CODE 636 W HCPCS: Performed by: STUDENT IN AN ORGANIZED HEALTH CARE EDUCATION/TRAINING PROGRAM

## 2023-03-11 PROCEDURE — 96367 TX/PROPH/DG ADDL SEQ IV INF: CPT

## 2023-03-11 PROCEDURE — G0378 HOSPITAL OBSERVATION PER HR: HCPCS

## 2023-03-11 PROCEDURE — 97166 OT EVAL MOD COMPLEX 45 MIN: CPT

## 2023-03-11 PROCEDURE — 25000003 PHARM REV CODE 250

## 2023-03-11 PROCEDURE — 97161 PT EVAL LOW COMPLEX 20 MIN: CPT

## 2023-03-11 PROCEDURE — 25000003 PHARM REV CODE 250: Performed by: STUDENT IN AN ORGANIZED HEALTH CARE EDUCATION/TRAINING PROGRAM

## 2023-03-11 PROCEDURE — 97530 THERAPEUTIC ACTIVITIES: CPT

## 2023-03-11 PROCEDURE — 36415 COLL VENOUS BLD VENIPUNCTURE: CPT | Performed by: INTERNAL MEDICINE

## 2023-03-11 PROCEDURE — 83735 ASSAY OF MAGNESIUM: CPT | Performed by: STUDENT IN AN ORGANIZED HEALTH CARE EDUCATION/TRAINING PROGRAM

## 2023-03-11 PROCEDURE — 96366 THER/PROPH/DIAG IV INF ADDON: CPT

## 2023-03-11 PROCEDURE — 36415 COLL VENOUS BLD VENIPUNCTURE: CPT | Performed by: STUDENT IN AN ORGANIZED HEALTH CARE EDUCATION/TRAINING PROGRAM

## 2023-03-11 RX ADMIN — ARIPIPRAZOLE 10 MG: 5 TABLET ORAL at 09:03

## 2023-03-11 RX ADMIN — PANTOPRAZOLE SODIUM 40 MG: 40 TABLET, DELAYED RELEASE ORAL at 08:03

## 2023-03-11 RX ADMIN — LEVETIRACETAM 1000 MG: 500 TABLET, FILM COATED ORAL at 08:03

## 2023-03-11 RX ADMIN — RIFAXIMIN 550 MG: 550 TABLET ORAL at 09:03

## 2023-03-11 RX ADMIN — THIAMINE HYDROCHLORIDE 500 MG: 100 INJECTION, SOLUTION INTRAMUSCULAR; INTRAVENOUS at 10:03

## 2023-03-11 RX ADMIN — SODIUM PHOSPHATE, MONOBASIC, MONOHYDRATE AND SODIUM PHOSPHATE, DIBASIC, ANHYDROUS 20.01 MMOL: 142; 276 INJECTION, SOLUTION INTRAVENOUS at 02:03

## 2023-03-11 RX ADMIN — SODIUM BICARBONATE 1300 MG: 650 TABLET ORAL at 08:03

## 2023-03-11 RX ADMIN — RIFAXIMIN 550 MG: 550 TABLET ORAL at 08:03

## 2023-03-11 RX ADMIN — MELATONIN TAB 3 MG 6 MG: 3 TAB at 09:03

## 2023-03-11 RX ADMIN — FOLIC ACID 1 MG: 1 TABLET ORAL at 08:03

## 2023-03-11 RX ADMIN — PROPRANOLOL HYDROCHLORIDE 20 MG: 10 TABLET ORAL at 08:03

## 2023-03-11 RX ADMIN — LEVETIRACETAM 1000 MG: 500 TABLET, FILM COATED ORAL at 09:03

## 2023-03-11 RX ADMIN — THIAMINE HYDROCHLORIDE 500 MG: 100 INJECTION, SOLUTION INTRAMUSCULAR; INTRAVENOUS at 02:03

## 2023-03-11 RX ADMIN — SODIUM BICARBONATE 1300 MG: 650 TABLET ORAL at 09:03

## 2023-03-11 NOTE — PROGRESS NOTES
[facility] Medicine Progress Note    Primary Team: [facility] Hospitalist Team B  Attending Physician: Andre Nicholson MD  Resident: Mellissa Leal MD    Hospital Day: 2     Chief Complaint: AMS    Subjective:   Alert and oriented x3, reports she stopped taking her lactulose because of the taste and effects of frequent loose bowel movements. Recognizes this was a bad idea, amenable to restarting at home.     Review of Systems   All other systems reviewed and are negative.      Objective:   Last 24 Hour Vital Signs:  BP  Min: 121/64  Max: 139/67  Temp  Av.4 °F (36.9 °C)  Min: 98.3 °F (36.8 °C)  Max: 98.7 °F (37.1 °C)  Pulse  Av  Min: 66  Max: 69  Resp  Av.8  Min: 18  Max: 20  SpO2  Av.8 %  Min: 96 %  Max: 98 %  Weight  Av.8 kg (103 lb 2.8 oz)  Min: 46.8 kg (103 lb 2.8 oz)  Max: 46.8 kg (103 lb 2.8 oz)    Intake/Output Summary (Last 24 hours) at 3/11/2023 0739  Last data filed at 3/10/2023 2300  Gross per 24 hour   Intake --   Output 300 ml   Net -300 ml          Physical Examination:  Physical Exam  HENT:      Head: Normocephalic and atraumatic.      Nose: Nose normal.      Mouth/Throat:      Mouth: Mucous membranes are moist.      Pharynx: Oropharynx is clear.   Eyes:      General: No scleral icterus.     Pupils: Pupils are equal, round, and reactive to light.   Cardiovascular:      Rate and Rhythm: Normal rate and regular rhythm.      Pulses: Normal pulses.      Heart sounds: Normal heart sounds.   Pulmonary:      Effort: Pulmonary effort is normal.      Breath sounds: Normal breath sounds.   Abdominal:      General: Abdomen is flat. Bowel sounds are normal.      Palpations: Abdomen is soft.      Tenderness: There is no abdominal tenderness.   Musculoskeletal:      Right lower leg: No edema.      Left lower leg: No edema.   Skin:     General: Skin is warm and dry.      Capillary Refill: Capillary refill takes less than 2 seconds.   Neurological:      General: No focal deficit present.       Mental Status: She is alert. She is disoriented.      Comments: Disoriented but improved; neg asterixis   Psychiatric:         Mood and Affect: Mood normal.         Behavior: Behavior normal.         Thought Content: Thought content normal.      Laboratory:  Recent Labs   Lab 03/09/23  1323 03/10/23  0635   WBC 4.45 4.35   HGB 13.2 12.3   HCT 41.3 37.4   * 113*   * 102*   RDW 15.4* 14.6*    138   K 5.1 4.5   * 113*   CO2 18* 16*   BUN 14 15   CREATININE 0.7 0.7   GLU 91 90   PROT 7.2 6.4   ALBUMIN 3.1* 2.7*   BILITOT 4.5* 4.5*   AST 41* 41*   ALKPHOS 140* 112   ALT 22 19       Microbiology Results (last 7 days)       ** No results found for the last 168 hours. **          I have personally reviewed the above laboratory studies.     Imaging:  EKG (my interpretation): sinus adan w/ abnormal EKG changes from previous.    X-Ray Chest 1 View    Result Date: 2/16/2023  EXAMINATION: XR CHEST 1 VIEW CLINICAL HISTORY: infectious w/u; TECHNIQUE: Single frontal view of the chest was performed. COMPARISON: 02/09/2023 FINDINGS: Less complete inspiration, rotate chest left.  Heart top normal size.  Mild generalized prominence of interstitial markings with overall remote appearance.  Chronic pleuroparenchymal change left cardiophrenic angle fat pad.     Chronic interstitial fibrotic change.  No pneumonia or other acute change. Electronically signed by: Darien Bernard MD Date:    02/16/2023 Time:    16:16    X-Ray Chest 1 View    Result Date: 2/9/2023  EXAMINATION: XR CHEST 1 VIEW CLINICAL HISTORY: r/o pneumonia; TECHNIQUE: One view COMPARISON: Comparison is made to 01/25/2023. FINDINGS: Heart size is normal, as is the appearance of the pulmonary vascularity.  Lung zones are clear, and are free of significant airspace consolidation or volume loss.  No pleural fluid.  No hilar or mediastinal mass lesion.  No pneumothorax.     No significant intrathoracic abnormality.  No significant detrimental interval  change in the appearance of the chest since 01/25/2023 is appreciated. Electronically signed by: Americo Reyes MD Date:    02/09/2023 Time:    11:12    CT Head Without Contrast    Result Date: 3/9/2023  EXAMINATION: CT HEAD WITHOUT CONTRAST CLINICAL HISTORY: Mental status change, unknown cause; TECHNIQUE: Multiple sequential 5 mm axial images of the head without contrast.  Coronal and sagittal reformatted imaging from the axial acquisition. COMPARISON: 02/07/2023 FINDINGS: Study is distorted by artifact from motion.  Subcentimeter lipoma along the interhemispheric fissure posteriorly stable Mild ill-defined decreased attenuation supratentorial white matter while nonspecific concerning for underlying chronic ischemic change.  Allowing for artifacts there is no evidence for acute intracranial hemorrhage or sulcal effacement.  The ventricles are normal in size without hydrocephalus.  There is no midline shift or mass effect.  Small lobular opacities maxillary antra with moderate opacification right ethmoid air cells.  Remaining visualized paranasal sinuses and mastoid air cells are clear.  Incidental probable cosmetic implants pre maxillary soft tissues bilaterally.     Allowing for slight motion artifact there is no definite acute intracranial findings specifically without evidence for acute intracranial hemorrhage or sulcal effacement to suggest large territory recent infarction Clinical correlation and further evaluation as warranted. Electronically signed by: Mikey Rodriguez DO Date:    03/09/2023 Time:    15:12    X-Ray Chest AP Portable    Result Date: 3/9/2023  EXAMINATION: XR CHEST AP PORTABLE CLINICAL HISTORY: Altered mental status; TECHNIQUE: Single frontal view of the chest was performed. COMPARISON: 02/16/2023 FINDINGS: Small lung volumes likely to poor inspiratory effort.  Reduced perihilar and ill-defined lung opacities which may represent resolving vascular congestion and edema.  There is no significant new  lung opacity.  No large lung consolidation.  No large effusion or pneumothorax.  Further evaluation as warranted clinically.     Please see above Electronically signed by: Mikey Rodriguez DO Date:    03/09/2023 Time:    13:51    US Abdomen Limited    Result Date: 3/9/2023  EXAMINATION: US ABDOMEN LIMITED CLINICAL HISTORY: Hyperbilirubinemia; TECHNIQUE: Limited abdominal ultrasound. COMPARISON: 02/09/2023, CT 01/21/2021 FINDINGS: Liver: Normal in size, measuring 11.2 cm. Homogeneous echotexture. No focal hepatic lesions. Gallbladder: Multiple echogenic stones are present.  The largest measures 1.2 cm.  Negative sonographic Tang sign.  Gallbladder is contracted with limited visualization.  Mild wall thickening is not excluded.  No pericholecystic fluid.  No wall hypervascularity.  The patient is unable to left lateral decubitus. Biliary system: The common duct is not dilated, measuring 5 mm.  No intrahepatic ductal dilatation. No acute abnormality of the inferior vena cava. The spleen measures 12.7 cm. Limited visualization of the pancreas which is obscured by gas. Miscellaneous: No upper abdominal ascites. Right pleural effusion is noted. No evidence of hydronephrosis of the right kidney on limited visualization. CT with contrast may better characterize.     1. Cholelithiasis. 2. Mild splenomegaly. 3. Right pleural effusion. Electronically signed by: Bora Roldan Date:    03/09/2023 Time:    16:29    US Abdomen Limited    Result Date: 2/9/2023  EXAMINATION: US ABDOMEN LIMITED CLINICAL HISTORY: r/o ascitis; TECHNIQUE: Limited ultrasound evaluation of the abdomen was performed to evaluate for ascites. COMPARISON: Abdominal ultrasound 01/25/2023.  CT abdomen pelvis 01/21/2021. FINDINGS: Limited sonographic evaluation of the all 4 abdominal quadrants demonstrates no significant ascites. Redemonstrated dilated varices within the right lower quadrant.     No significant ascites. Electronically signed by resident:  Wilian Killian Date:    02/09/2023 Time:    10:15 Electronically signed by: Eriberto Holliday Date:    02/09/2023 Time:    10:49    Current Medications:     Scheduled:   ARIPiprazole  10 mg Oral Nightly    folic acid  1 mg Oral Daily    lactulose  30 g Oral Q6H    levETIRAcetam  1,000 mg Oral BID    pantoprazole  40 mg Oral Daily    propranoloL  20 mg Oral Daily    rifAXImin  550 mg Oral BID    sodium bicarbonate  1,300 mg Oral BID    thiamine (VITAMIN B1) IVPB  500 mg Intravenous TID    Followed by    [START ON 3/12/2023] thiamine (VITAMIN B1) IVPB  250 mg Intravenous Daily         Infusions:   sodium chloride 0.9%          PRN:  dextrose 10%, dextrose 10%, dextrose, dextrose, glucagon (human recombinant), melatonin, naloxone, sodium chloride 0.9%    Antibiotics and Day Number of Therapy:  Antibiotics (From admission, onward)      Start     Stop Route Frequency Ordered    03/10/23 1215  rifAXIMin tablet 550 mg         -- Oral 2 times daily 03/10/23 1106           Assessment:     Marely Hamilton is a 58 y.o. female with a PMHx of alcoholic cirrhosis, hepatic encephalopathy, seizure, and bipolar 1 disorder presenting with AMS for an unknown duration at this time. Pt was previously admitted here last month for AMS 2/2 hepatic encephalopathy. Pt will be admitted to [facility] for the evaluation and management of AMS.     Plan:     Hepatic encephalopathy  Likely given recent presentation, elevated NH3 55, and acute mental status change  Lactulose titrated to 3 bowel movements daily    Cirrhosis 2/2 alcohol use disorder  From previous admission w/ MELD-Na & MELD scores: 16  INR 1.6; PT 16; aPTT 33.9  MELD-Na score: 17  MELD score: 17  Holding home Lasix and Spironolactone given concern for dehydration and K>5 respecively  Continue Propranolol  Low-sodium diet     Bipolar 1 disorder  Home Lithium and Abilify, w/ Seroquel for sleep per chart  Low Lithium levels 0.2  Continue Abilify  Holding home Lithium and Seroquel; PRN melatonin  and vistaril for sleep  Currently sees Dr Coates at [facility] for outpatient psychiatry  Consulted Psych for med management, rec:   - Abilify 10 mg at bedtime  - Zyprexa 5 mg PO/IM Q8H PRN for agitation         Macrocytic anemia  Hx of Macrocytic anemia on previous admission  H&H this admit currently stable and wnl;   B12 & Folate normal  Pending Thiamine  Supplemental Thiamine, Folate, B12    Cholelithiasis without obstruction  U/S Abd showed multiple echogenic stones w/o evidence of obstruction at this time.  AST/ALT stable; ALP trending down     History of seizure  Continue home Keppra     Thrombocytopenia, mild  Plt ~110-113     Non-Anion Gap Metabolic Acidosis  HCO3 18, AG 8, Cl 114  Continue home Bicarb     Ppx: SCD; PPI  Diet: Low Sodium  Code: Full    Disposition: pending resolution/improvement of AMS; collaterals for HPI and discharge planning    Mellissa Leal MD      [facility] Medicine Hospitalist Pager numbers:   [facility] Hospitalist Medicine Team A (Angela/Vianney): [phone]  [facility] Hospitalist Medicine Team B (Lyn/Edenilson):  [phone]

## 2023-03-11 NOTE — PT/OT/SLP EVAL
Occupational Therapy   Evaluation    Name: Marely Hamilton  MRN: [id number]  Admitting Diagnosis: <principal problem not specified>  Recent Surgery: * No surgery found *      Recommendations:     Discharge Recommendations:  (SNF but may progress to HHOT/PT)  Discharge Equipment Recommendations:  walker, rolling, shower chair  Barriers to discharge:   (Pt requires increased assistance)    Assessment:     Marely Hamilton is a 58 y.o. female with a medical diagnosis of <principal problem not specified>.  She presents with deconditioning, slight asterixis with fatigue while walking in hallway, L lateral lean with fatigue in stance. Performance deficits affecting function: weakness, impaired endurance, impaired self care skills, impaired functional mobility, gait instability, decreased lower extremity function, decreased upper extremity function, impaired balance, impaired fine motor, impaired coordination, decreased coordination.      Rehab Prognosis: Good; patient would benefit from acute skilled OT services to address these deficits and reach maximum level of function.       Plan:     Patient to be seen 3 x/week to address the above listed problems via self-care/home management, therapeutic activities, therapeutic exercises, neuromuscular re-education  Plan of Care Expires: 04/11/23  Plan of Care Reviewed with: patient    Subjective     Chief Complaint: Pt reports that she is feeling a little better  Patient/Family Comments/goals: To return to [facility]    Occupational Profile:  Living Environment: Pt lives with 92 year old mother in [address] (reports no seat in shower)  Previous level of function: Pt states she is Mod I for ADLs and functional mobility but requires assistance for bathing  Roles and Routines: Caretaker to self, some light housework and meal prep. Family has hired caregivers 6 days/week 7AM-7PM. Pt worked as a  in [employer]  Equipment Used at Home: rollator  Assistance upon Discharge:  Hired caregivers but unknown level of physical assistance available as caregivers are present for mother per pt report    Pain/Comfort:  Pain Rating 1: 0/10    Patients cultural, spiritual, Episcopal conflicts given the current situation:      Objective:     Communicated with: nscharlene prior to session.  Patient found HOB elevated with bed alarm, telemetry, peripheral IV upon OT entry to room.    General Precautions: Standard, fall  Orthopedic Precautions: N/A  Braces: N/A  Respiratory Status: Room air    Occupational Performance:    Bed Mobility:    Patient completed Rolling/Turning to Right with contact guard assistance  Patient completed Scooting/Bridging with contact guard assistance  Patient completed Supine to Sit with contact guard assistance    Functional Mobility/Transfers:  Patient completed Sit <> Stand Transfer with minimum assistance  with  rolling walker   Patient completed Bed <> Chair Transfer using Step Transfer technique with contact guard assistance and minimum assistance with rolling walker  Functional Mobility: Pt with fair to fair- dynamic seated and standing balance.     Activities of Daily Living:  Grooming: stand by assistance to brush teeth seated in bed side chair to open small packaging  Upper Body Dressing: minimum assistance to don gown as robe seated EOB  Lower Body Dressing: contact guard assistance to don/doff B socks seated EOB    Cognitive/Visual Perceptual:  Cognitive/Psychosocial Skills:     -       Oriented to: Person, Place, Time and Situation   -       Follows Commands/attention:Follows multistep  commands  -       Communication: clear/fluent  -       Memory: No Deficits noted  -       Safety awareness/insight to disability: intact   -       Mood/Affect/Coping skills/emotional control: Appropriate to situation     Physical Exam:  Postural examination/scapula alignment:    -       Rounded shoulders  Skin integrity: Visible skin intact  Motor Planning:    -      Some impairment  noted with slight asterixis   Dominant hand:    -       R handed  Upper Extremity Range of Motion: BUE WFL     Upper Extremity Strength:  BUE grossly 3 to 3+/5   Strength:  B hands 3+/5  Fine Motor Coordination:    -      Fairly intact but increased time required in functional task practice  Gross motor coordination:   WFL     AMPAC 6 Click ADL:  AMPAC Total Score: 19    Treatment & Education:  Pt educated on role of OT and POC.   Pt performing skills as listed above.   Pt ambulated in room and in hallway ~50 ft with increased assistance required upon return to room 2/2 increased unsteadiness and asterixis like movement with fatigue.    Patient left up in chair with all lines intact, call button in reach, chair alarm on, and nsg notified    GOALS:   Multidisciplinary Problems       Occupational Therapy Goals          Problem: Occupational Therapy    Goal Priority Disciplines Outcome Interventions   Occupational Therapy Goal     OT, PT/OT Ongoing, Progressing    Description: Goals to be met by: 03/11/2023      Patient will increase functional independence with ADLs by performing:    UE Dressing with Supervision.  LE Dressing with Contact Guard Assistance.  Grooming while standing with Stand-by Assistance.  Toileting from toilet with Supervision for hygiene and clothing management.   Toilet transfer to bedside commode over toilet with Supervision.  Increased functional strength to WFL for self care.  Upper extremity exercise program x10 reps per handout, with supervision.                          History:     Past Medical History:   Diagnosis Date    Addiction to drug     Alcohol abuse     Alcohol abuse, in remission 6/15/2015    14.5 weeks ago; AA weekly    Anemia     Anxiety 6/15/2015    Behavioral problem     Bipolar disorder     Bipolar disorder in remission 6/15/2015    Cirrhosis, Laennec's 6/15/2015    Depression     Encounter for blood transfusion     Epistaxis 6/15/2015    Fatigue     Glaucoma      Hematuria     Hepatic encephalopathy 6/15/2015    Hepatic enlargement     History of psychiatric care     History of psychiatric hospitalization     History of seizure 6/15/2015    1    hx of intentional Tylenol overdose 6/15/2015    2005- situational and hx of bipolar    Hx of psychiatric care     Macrocytic anemia 9/18/2015    6 units PRBC since June 2015    Jeana     Osteoarthritis     Other ascites 6/15/2015    Psychiatric exam requested by authority     Psychiatric problem     Psychosis 9/26/2019    Renal disorder     Seizures     Self-harming behavior     Suicide attempt     Therapy     Thrombocytopenia 6/15/2015         Past Surgical History:   Procedure Laterality Date    COSMETIC SURGERY      ESOPHAGOGASTRODUODENOSCOPY         Time Tracking:     OT Date of Treatment: 03/11/23  OT Start Time: 1325  OT Stop Time: 1358  OT Total Time (min): 33 min    Billable Minutes:Evaluation 20  Therapeutic Activity 13    3/11/2023

## 2023-03-11 NOTE — NURSING
RAPID RESPONSE NURSE PROACTIVE ROUNDING NOTE       Time of Visit: 013    Admit Date: 3/9/2023  LOS: 0  Code Status: Full Code   Date of Visit: 2023  : 1965  Age: 58 y.o.  Sex: female  Race: White  Bed: K530/K530 A:   MRN: [id number]  Was the patient discharged from an ICU this admission? No   Was the patient discharged from a PACU within last 24 hours? No   Did the patient receive conscious sedation/general anesthesia in last 24 hours? No   Was the patient in the ED within the past 24 hours? Yes   Was the patient on NIPPV within the past 24 hours? No   Attending Physician: Andre Nicholson MD  Primary Service: Allergy,Allergy and Immunology,Internal Medicine   Time spent at the bedside: < 15 min    SITUATION    Notified by bedside RN via phone call  Reason for alert: IV access    Diagnosis: <principal problem not specified>   has a past medical history of Addiction to drug, Alcohol abuse, Alcohol abuse, in remission, Anemia, Anxiety, Behavioral problem, Bipolar disorder, Bipolar disorder in remission, Cirrhosis, Laennec's, Depression, Encounter for blood transfusion, Epistaxis, Fatigue, Glaucoma, Hematuria, Hepatic encephalopathy, Hepatic enlargement, History of psychiatric care, History of psychiatric hospitalization, History of seizure, hx of intentional Tylenol overdose, psychiatric care, Macrocytic anemia, Jeana, Osteoarthritis, Other ascites, Psychiatric exam requested by authority, Psychiatric problem, Psychosis, Renal disorder, Seizures, Self-harming behavior, Suicide attempt, Therapy, and Thrombocytopenia.    Last Vitals:  Temp: 98.5 °F (36.9 °C) (03/10 2331)  Pulse: 69 ( 0030)  Resp: 19 (03/10 2331)  BP: 133/71 (03/10 2331)  SpO2: 98 % (03/10 2331)    24 Hour Vitals Range:  Temp:  [98.4 °F (36.9 °C)-98.7 °F (37.1 °C)]   Pulse:  [65-69]   Resp:  [10-19]   BP: (120-134)/(64-71)   SpO2:  [95 %-98 %]     22G PIV placed in R forearm via ultrasound. Nurse notified.    Call back the Rapid Response  Nurse, Jovi SHULTZ at  for additional questions or concerns.

## 2023-03-11 NOTE — PLAN OF CARE
Problem: Occupational Therapy  Goal: Occupational Therapy Goal  Description: Goals to be met by: 03/11/2023      Patient will increase functional independence with ADLs by performing:    UE Dressing with Supervision.  LE Dressing with Contact Guard Assistance.  Grooming while standing with Stand-by Assistance.  Toileting from toilet with Supervision for hygiene and clothing management.   Toilet transfer to bedside commode over toilet with Supervision.  Increased functional strength to WFL for self care.  Upper extremity exercise program x10 reps per handout, with supervision.     Outcome: Ongoing, Progressing   Pt would benefit from continued OT to address deficits in self care and functional mobility. Recommending SNF but may progress to HHOT/PT; DME needs likely [place], [place]

## 2023-03-12 LAB
ALBUMIN SERPL BCP-MCNC: 2.5 G/DL (ref 3.5–5.2)
ALP SERPL-CCNC: 130 U/L (ref 55–135)
ALT SERPL W/O P-5'-P-CCNC: 19 U/L (ref 10–44)
ANION GAP SERPL CALC-SCNC: 6 MMOL/L (ref 8–16)
AST SERPL-CCNC: 30 U/L (ref 10–40)
BASOPHILS # BLD AUTO: 0.03 K/UL (ref 0–0.2)
BASOPHILS NFR BLD: 0.7 % (ref 0–1.9)
BILIRUB SERPL-MCNC: 2.6 MG/DL (ref 0.1–1)
BUN SERPL-MCNC: 12 MG/DL (ref 6–20)
CALCIUM SERPL-MCNC: 8.5 MG/DL (ref 8.7–10.5)
CHLORIDE SERPL-SCNC: 108 MMOL/L (ref 95–110)
CO2 SERPL-SCNC: 24 MMOL/L (ref 23–29)
CREAT SERPL-MCNC: 0.7 MG/DL (ref 0.5–1.4)
DIFFERENTIAL METHOD: ABNORMAL
EOSINOPHIL # BLD AUTO: 0.2 K/UL (ref 0–0.5)
EOSINOPHIL NFR BLD: 4.3 % (ref 0–8)
ERYTHROCYTE [DISTWIDTH] IN BLOOD BY AUTOMATED COUNT: 14.5 % (ref 11.5–14.5)
EST. GFR  (NO RACE VARIABLE): >60 ML/MIN/1.73 M^2
GLUCOSE SERPL-MCNC: 112 MG/DL (ref 70–110)
HCT VFR BLD AUTO: 32.4 % (ref 37–48.5)
HGB BLD-MCNC: 10.8 G/DL (ref 12–16)
IMM GRANULOCYTES # BLD AUTO: 0.01 K/UL (ref 0–0.04)
IMM GRANULOCYTES NFR BLD AUTO: 0.2 % (ref 0–0.5)
LYMPHOCYTES # BLD AUTO: 1.6 K/UL (ref 1–4.8)
LYMPHOCYTES NFR BLD: 37.9 % (ref 18–48)
MAGNESIUM SERPL-MCNC: 1.7 MG/DL (ref 1.6–2.6)
MCH RBC QN AUTO: 33.4 PG (ref 27–31)
MCHC RBC AUTO-ENTMCNC: 33.3 G/DL (ref 32–36)
MCV RBC AUTO: 100 FL (ref 82–98)
MONOCYTES # BLD AUTO: 0.5 K/UL (ref 0.3–1)
MONOCYTES NFR BLD: 12.7 % (ref 4–15)
NEUTROPHILS # BLD AUTO: 1.8 K/UL (ref 1.8–7.7)
NEUTROPHILS NFR BLD: 44.2 % (ref 38–73)
NRBC BLD-RTO: 0 /100 WBC
PHOSPHATE SERPL-MCNC: 3 MG/DL (ref 2.7–4.5)
PLATELET # BLD AUTO: 108 K/UL (ref 150–450)
PMV BLD AUTO: 10.5 FL (ref 9.2–12.9)
POTASSIUM SERPL-SCNC: 4.2 MMOL/L (ref 3.5–5.1)
PROT SERPL-MCNC: 5.6 G/DL (ref 6–8.4)
RBC # BLD AUTO: 3.23 M/UL (ref 4–5.4)
SODIUM SERPL-SCNC: 138 MMOL/L (ref 136–145)
WBC # BLD AUTO: 4.17 K/UL (ref 3.9–12.7)

## 2023-03-12 PROCEDURE — 36415 COLL VENOUS BLD VENIPUNCTURE: CPT | Performed by: STUDENT IN AN ORGANIZED HEALTH CARE EDUCATION/TRAINING PROGRAM

## 2023-03-12 PROCEDURE — 96366 THER/PROPH/DIAG IV INF ADDON: CPT

## 2023-03-12 PROCEDURE — 63600175 PHARM REV CODE 636 W HCPCS: Performed by: STUDENT IN AN ORGANIZED HEALTH CARE EDUCATION/TRAINING PROGRAM

## 2023-03-12 PROCEDURE — 85025 COMPLETE CBC W/AUTO DIFF WBC: CPT | Performed by: STUDENT IN AN ORGANIZED HEALTH CARE EDUCATION/TRAINING PROGRAM

## 2023-03-12 PROCEDURE — 83735 ASSAY OF MAGNESIUM: CPT | Performed by: STUDENT IN AN ORGANIZED HEALTH CARE EDUCATION/TRAINING PROGRAM

## 2023-03-12 PROCEDURE — 25000003 PHARM REV CODE 250

## 2023-03-12 PROCEDURE — 80053 COMPREHEN METABOLIC PANEL: CPT | Performed by: STUDENT IN AN ORGANIZED HEALTH CARE EDUCATION/TRAINING PROGRAM

## 2023-03-12 PROCEDURE — G0378 HOSPITAL OBSERVATION PER HR: HCPCS

## 2023-03-12 PROCEDURE — 84100 ASSAY OF PHOSPHORUS: CPT | Performed by: STUDENT IN AN ORGANIZED HEALTH CARE EDUCATION/TRAINING PROGRAM

## 2023-03-12 PROCEDURE — 25000003 PHARM REV CODE 250: Performed by: STUDENT IN AN ORGANIZED HEALTH CARE EDUCATION/TRAINING PROGRAM

## 2023-03-12 RX ADMIN — RIFAXIMIN 550 MG: 550 TABLET ORAL at 10:03

## 2023-03-12 RX ADMIN — SODIUM BICARBONATE 1300 MG: 650 TABLET ORAL at 10:03

## 2023-03-12 RX ADMIN — FOLIC ACID 1 MG: 1 TABLET ORAL at 08:03

## 2023-03-12 RX ADMIN — ARIPIPRAZOLE 10 MG: 5 TABLET ORAL at 10:03

## 2023-03-12 RX ADMIN — RIFAXIMIN 550 MG: 550 TABLET ORAL at 08:03

## 2023-03-12 RX ADMIN — PROPRANOLOL HYDROCHLORIDE 20 MG: 10 TABLET ORAL at 08:03

## 2023-03-12 RX ADMIN — PANTOPRAZOLE SODIUM 40 MG: 40 TABLET, DELAYED RELEASE ORAL at 08:03

## 2023-03-12 RX ADMIN — THIAMINE HYDROCHLORIDE 250 MG: 100 INJECTION, SOLUTION INTRAMUSCULAR; INTRAVENOUS at 08:03

## 2023-03-12 RX ADMIN — LEVETIRACETAM 1000 MG: 500 TABLET, FILM COATED ORAL at 10:03

## 2023-03-12 RX ADMIN — MELATONIN TAB 3 MG 6 MG: 3 TAB at 10:03

## 2023-03-12 RX ADMIN — LEVETIRACETAM 1000 MG: 500 TABLET, FILM COATED ORAL at 08:03

## 2023-03-12 RX ADMIN — LACTULOSE 30 G: 20 SOLUTION ORAL at 05:03

## 2023-03-12 RX ADMIN — LACTULOSE 30 G: 20 SOLUTION ORAL at 01:03

## 2023-03-12 RX ADMIN — LACTULOSE 30 G: 20 SOLUTION ORAL at 11:03

## 2023-03-12 RX ADMIN — SODIUM BICARBONATE 1300 MG: 650 TABLET ORAL at 08:03

## 2023-03-12 NOTE — PLAN OF CARE
Patient A&Ox2.Up to bathroom with standby assist. No c/o pain or discomfort. Call light within reach. Bed alarm on.

## 2023-03-12 NOTE — PROGRESS NOTES
[facility] Medicine Progress Note    Primary Team: [facility] Hospitalist Team B  Attending Physician: Andre Nicholson MD  Resident: Mellissa Leal MD    Hospital Day: 3     Chief Complaint: AMS    Subjective:   Remains alert and oriented x3,     Review of Systems   All other systems reviewed and are negative.      Objective:   Last 24 Hour Vital Signs:  BP  Min: 98/53  Max: 120/66  Temp  Av.4 °F (36.9 °C)  Min: 98.1 °F (36.7 °C)  Max: 98.6 °F (37 °C)  Pulse  Av.7  Min: 62  Max: 70  Resp  Av.5  Min: 17  Max: 20  SpO2  Av.5 %  Min: 93 %  Max: 96 %    Intake/Output Summary (Last 24 hours) at 3/12/2023 1248  Last data filed at 3/11/2023 1812  Gross per 24 hour   Intake 240 ml   Output --   Net 240 ml          Physical Examination:  Physical Exam  HENT:      Head: Normocephalic and atraumatic.      Nose: Nose normal.      Mouth/Throat:      Mouth: Mucous membranes are moist.      Pharynx: Oropharynx is clear.   Eyes:      General: No scleral icterus.     Pupils: Pupils are equal, round, and reactive to light.   Cardiovascular:      Rate and Rhythm: Normal rate and regular rhythm.      Pulses: Normal pulses.      Heart sounds: Normal heart sounds.   Pulmonary:      Effort: Pulmonary effort is normal.      Breath sounds: Normal breath sounds.   Abdominal:      General: Abdomen is flat. Bowel sounds are normal.      Palpations: Abdomen is soft.      Tenderness: There is no abdominal tenderness.   Musculoskeletal:      Right lower leg: No edema.      Left lower leg: No edema.   Skin:     General: Skin is warm and dry.      Capillary Refill: Capillary refill takes less than 2 seconds.   Neurological:      General: No focal deficit present.      Mental Status: She is alert. She is disoriented.      Comments: Disoriented but improved; neg asterixis   Psychiatric:         Mood and Affect: Mood normal.         Behavior: Behavior normal.         Thought Content: Thought content normal.      Laboratory:  Recent Labs    Lab 03/10/23  0635 03/11/23  0707 03/11/23  0755 03/12/23  0602   WBC 4.35  --  6.22 4.17   HGB 12.3  --  12.8 10.8*   HCT 37.4  --  38.7 32.4*   *  --  105* 108*   *  --  100* 100*   RDW 14.6*  --  14.8* 14.5    138  --  138   K 4.5 4.0  --  4.2   * 116*  --  108   CO2 16* 15*  --  24   BUN 15 11  --  12   CREATININE 0.7 0.7  --  0.7   GLU 90 128*  --  112*   PROT 6.4 6.2  --  5.6*   ALBUMIN 2.7* 2.7*  --  2.5*   BILITOT 4.5* 3.5*  --  2.6*   AST 41* 35  --  30   ALKPHOS 112 119  --  130   ALT 19 19  --  19       Microbiology Results (last 7 days)       ** No results found for the last 168 hours. **          I have personally reviewed the above laboratory studies.     Imaging:  EKG (my interpretation): sinus adan w/ abnormal EKG changes from previous.    X-Ray Chest 1 View    Result Date: 2/16/2023  EXAMINATION: XR CHEST 1 VIEW CLINICAL HISTORY: infectious w/u; TECHNIQUE: Single frontal view of the chest was performed. COMPARISON: 02/09/2023 FINDINGS: Less complete inspiration, rotate chest left.  Heart top normal size.  Mild generalized prominence of interstitial markings with overall remote appearance.  Chronic pleuroparenchymal change left cardiophrenic angle fat pad.     Chronic interstitial fibrotic change.  No pneumonia or other acute change. Electronically signed by: Darien Bernard MD Date:    02/16/2023 Time:    16:16    X-Ray Chest 1 View    Result Date: 2/9/2023  EXAMINATION: XR CHEST 1 VIEW CLINICAL HISTORY: r/o pneumonia; TECHNIQUE: One view COMPARISON: Comparison is made to 01/25/2023. FINDINGS: Heart size is normal, as is the appearance of the pulmonary vascularity.  Lung zones are clear, and are free of significant airspace consolidation or volume loss.  No pleural fluid.  No hilar or mediastinal mass lesion.  No pneumothorax.     No significant intrathoracic abnormality.  No significant detrimental interval change in the appearance of the chest since 01/25/2023 is  appreciated. Electronically signed by: Americo Reyes MD Date:    02/09/2023 Time:    11:12    CT Head Without Contrast    Result Date: 3/9/2023  EXAMINATION: CT HEAD WITHOUT CONTRAST CLINICAL HISTORY: Mental status change, unknown cause; TECHNIQUE: Multiple sequential 5 mm axial images of the head without contrast.  Coronal and sagittal reformatted imaging from the axial acquisition. COMPARISON: 02/07/2023 FINDINGS: Study is distorted by artifact from motion.  Subcentimeter lipoma along the interhemispheric fissure posteriorly stable Mild ill-defined decreased attenuation supratentorial white matter while nonspecific concerning for underlying chronic ischemic change.  Allowing for artifacts there is no evidence for acute intracranial hemorrhage or sulcal effacement.  The ventricles are normal in size without hydrocephalus.  There is no midline shift or mass effect.  Small lobular opacities maxillary antra with moderate opacification right ethmoid air cells.  Remaining visualized paranasal sinuses and mastoid air cells are clear.  Incidental probable cosmetic implants pre maxillary soft tissues bilaterally.     Allowing for slight motion artifact there is no definite acute intracranial findings specifically without evidence for acute intracranial hemorrhage or sulcal effacement to suggest large territory recent infarction Clinical correlation and further evaluation as warranted. Electronically signed by: Mikey Rodriguez DO Date:    03/09/2023 Time:    15:12    X-Ray Chest AP Portable    Result Date: 3/9/2023  EXAMINATION: XR CHEST AP PORTABLE CLINICAL HISTORY: Altered mental status; TECHNIQUE: Single frontal view of the chest was performed. COMPARISON: 02/16/2023 FINDINGS: Small lung volumes likely to poor inspiratory effort.  Reduced perihilar and ill-defined lung opacities which may represent resolving vascular congestion and edema.  There is no significant new lung opacity.  No large lung consolidation.  No large  effusion or pneumothorax.  Further evaluation as warranted clinically.     Please see above Electronically signed by: Mikey Rodriguez DO Date:    03/09/2023 Time:    13:51    US Abdomen Limited    Result Date: 3/9/2023  EXAMINATION: US ABDOMEN LIMITED CLINICAL HISTORY: Hyperbilirubinemia; TECHNIQUE: Limited abdominal ultrasound. COMPARISON: 02/09/2023, CT 01/21/2021 FINDINGS: Liver: Normal in size, measuring 11.2 cm. Homogeneous echotexture. No focal hepatic lesions. Gallbladder: Multiple echogenic stones are present.  The largest measures 1.2 cm.  Negative sonographic Tang sign.  Gallbladder is contracted with limited visualization.  Mild wall thickening is not excluded.  No pericholecystic fluid.  No wall hypervascularity.  The patient is unable to left lateral decubitus. Biliary system: The common duct is not dilated, measuring 5 mm.  No intrahepatic ductal dilatation. No acute abnormality of the inferior vena cava. The spleen measures 12.7 cm. Limited visualization of the pancreas which is obscured by gas. Miscellaneous: No upper abdominal ascites. Right pleural effusion is noted. No evidence of hydronephrosis of the right kidney on limited visualization. CT with contrast may better characterize.     1. Cholelithiasis. 2. Mild splenomegaly. 3. Right pleural effusion. Electronically signed by: Bora Roldan Date:    03/09/2023 Time:    16:29    US Abdomen Limited    Result Date: 2/9/2023  EXAMINATION: US ABDOMEN LIMITED CLINICAL HISTORY: r/o ascitis; TECHNIQUE: Limited ultrasound evaluation of the abdomen was performed to evaluate for ascites. COMPARISON: Abdominal ultrasound 01/25/2023.  CT abdomen pelvis 01/21/2021. FINDINGS: Limited sonographic evaluation of the all 4 abdominal quadrants demonstrates no significant ascites. Redemonstrated dilated varices within the right lower quadrant.     No significant ascites. Electronically signed by resident: Wilian Daily Date:    02/09/2023 Time:    10:15  Electronically signed by: Eriberto Holliday Date:    02/09/2023 Time:    10:49    Current Medications:     Scheduled:   ARIPiprazole  10 mg Oral Nightly    folic acid  1 mg Oral Daily    lactulose  30 g Oral Q6H    levETIRAcetam  1,000 mg Oral BID    pantoprazole  40 mg Oral Daily    propranoloL  20 mg Oral Daily    rifAXImin  550 mg Oral BID    sodium bicarbonate  1,300 mg Oral BID    thiamine (VITAMIN B1) IVPB  250 mg Intravenous Daily         Infusions:   sodium chloride 0.9%          PRN:  dextrose 10%, dextrose 10%, dextrose, dextrose, glucagon (human recombinant), melatonin, naloxone, sodium chloride 0.9%    Antibiotics and Day Number of Therapy:  Antibiotics (From admission, onward)      Start     Stop Route Frequency Ordered    03/10/23 1215  rifAXIMin tablet 550 mg         -- Oral 2 times daily 03/10/23 1106           Assessment:     Marely Hamilton is a 58 y.o. female with a PMHx of alcoholic cirrhosis, hepatic encephalopathy, seizure, and bipolar 1 disorder presenting with AMS for an unknown duration at this time. Pt was previously admitted here last month for AMS 2/2 hepatic encephalopathy. Pt admitted to [facility] for the evaluation and management of AMS.     Plan:     Hepatic encephalopathy, resolved  Likely given recent presentation, elevated NH3 55, and acute mental status change  Lactulose titrated to 3 bowel movements daily    Cirrhosis 2/2 alcohol use disorder  From previous admission w/ MELD-Na & MELD scores: 16  INR 1.6; PT 16; aPTT 33.9  MELD-Na score: 17  MELD score: 17  Holding home Lasix and Spironolactone given concern for dehydration and K>5 respecively  Continue Propranolol  Low-sodium diet     Bipolar 1 disorder  Home Lithium and Abilify, w/ Seroquel for sleep per chart  Low Lithium levels 0.2  Continue Abilify  Holding home Lithium and Seroquel; PRN melatonin and vistaril for sleep  Currently sees Dr Co[facility] at [facility] for [facility]  Consulted Psych for med management, rec:   - Abilify  10 mg at bedtime  - Zyprexa 5 mg PO/IM Q8H PRN for agitation       Macrocytic anemia  Hx of Macrocytic anemia on previous admission  H&H this admit currently stable and wnl;   B12 & Folate normal  Pending Thiamine  Supplemental Thiamine, Folate, B12    Cholelithiasis without obstruction  U/S Abd showed multiple echogenic stones w/o evidence of obstruction at this time.  AST/ALT stable; ALP trending down     History of seizure  Continue home Keppra     Thrombocytopenia, mild  Plt ~110-113     Non-Anion Gap Metabolic Acidosis  HCO3 18, AG 8, Cl 114  Continue home Bicarb     Ppx: SCD; PPI  Diet: Low Sodium  Code: Full    Disposition: home with PT vs post acute placement for PT    Mellissa Leal MD      [facility] Hospitalist Pager numbers:   [facility] Hospitalist Medicine Team A (Angela/Vianney): [phone]  [facility] Hospitalist Medicine Team B (Lyn/Edenilson):  [phone]

## 2023-03-12 NOTE — PLAN OF CARE
Problem: Physical Therapy  Goal: Physical Therapy Goal  Description: Goals to be met by: 2023     Patient will increase functional independence with mobility by performin. Supine <> sit with  Supervision/Modified [place]  2. Sit to stand transfer with Supervision and Stand-by Assistance  3. Bed to chair transfer with Supervision and Stand-by Assistance using Rolling Walker  4. Gait  x 100 feet with Stand-by Assistance using Rolling Walker.     Outcome: Ongoing, Progressing   PT evaluation was completed. Pt requires CGAx 1 with supine<>sit, MINAx 1 with sit<>stand transfer and ambulation using a rw 40 ft requiring MIN/CGAx 1 exhibiting decreased B HS with high steppage gait pattern. Pt will benefit from post acute placement upon d/c for further therapy to address functional mobility deficits.

## 2023-03-12 NOTE — PLAN OF CARE
Patient disoriented to time. Awake and alert. Denies pain. Medications administered per MAR. Seizure precautions maintained. Safety maintained.

## 2023-03-12 NOTE — PT/OT/SLP EVAL
Physical Therapy Evaluation    Patient Name:  Marely Hamilton   MRN:  [id number]    Recommendations:     Discharge Recommendations: nursing facility, skilled (pending progress with PT - may be able to return home with HH PT/OT)   Discharge Equipment Recommendations: walker, rolling, shower chair   Barriers to discharge: Decreased caregiver support and impaired functional mobility    Assessment:     Marely Hamilton is a 58 y.o. female admitted with a medical diagnosis of <principal problem not specified>.  She presents with the following impairments/functional limitations: weakness, impaired endurance, impaired self care skills, impaired functional mobility, gait instability, impaired balance, decreased lower extremity function, decreased safety awareness, impaired fine motor, impaired coordination, decreased upper extremity function, decreased coordination. Pt requires CGAx 1 with supine<>sit, MINAx 1 with sit<>stand transfer and ambulation using a rw 40 ft requiring MIN/CGAx 1 exhibiting decreased B HS with high steppage gait pattern. Pt will benefit from post acute placement upon d/c for further therapy to address functional mobility deficits..    Rehab Prognosis: Good; patient would benefit from acute skilled PT services to address these deficits and reach maximum level of function.    Recent Surgery: * No surgery found *      Plan:     During this hospitalization, patient to be seen 5 x/week to address the identified rehab impairments via gait training, therapeutic activities, therapeutic exercises and progress toward the following goals:    Plan of Care Expires:  04/11/23    Subjective     Chief Complaint: unsteadiness with ambulation  Patient/Family Comments/goals: to get stronger and be independent   Pain/Comfort:  Pain Rating 1: 0/10  Pain Rating Post-Intervention 1: 0/10    Patients cultural, spiritual, Yarsanism conflicts given the current situation: no    Living Environment:  Pt lives with elderly mom in  [facility], 1 [address] without railing and walk in shower.Pt reports has hired sitter 6 x wk from 7 am to 7pm to assist with transportation, cooking and housekeeping needs. Pt reports receiving  PT and OT prior to this hospitalization.  Prior to admission, patients level of function was MOD (I) with ADLs and ambulation using rollator.  Equipment used at home: rollator.  DME owned (not currently used): bedside commode and wheelchair.  Upon discharge, patient will have assistance from sitter from 7 am to 7 pm 6 x wk.    Objective:     Communicated with RN prior to session.  Patient found HOB elevated with bed alarm, telemetry, peripheral IV  upon PT entry to room.    General Precautions: Standard, fall  Orthopedic Precautions:N/A   Braces: N/A  Respiratory Status: Room air    Exams:  Cognitive Exam:  Patient is oriented to Person, Place, Time, and Situation  Gross Motor Coordination:  WFL  Postural Exam:  Patient presented with the following abnormalities:    -       Rounded shoulders  RLE ROM: WFL except active ankle DF to neutral only  RLE Strength: 3+/5  LLE ROM: WFL except active ankle DF to neutral only  LLE Strength: 3+/5    Functional Mobility:  Bed Mobility:     Supine to Sit: contact guard assistance with cues for efficient technique  Sit to Supine: contact guard assistance with cues for efficient technique  Transfers:     Sit to Stand:  minimum assistance with no AD and cues for increasing ant wt shifting over CHETAN  Gait: ambulates 40 ft using a rw with MIN/CGAx 1 needing assist at time for proper rw advancement exp with turns exhibiting decreased B heel strike, high steppage gait and leaning posteriorly.  Balance: Static stand : F- using a rw      Dynamic stand: P+ using rw      AM-PAC 6 CLICK MOBILITY  Total Score:17       Treatment & Education:  Pt was educated in role of PT and POC. Pt  performed mobility skills as reported above needing cues for safety, balance  and proper rw management during transfer/ gait  skills.    Patient left HOB elevated with call button in reach, bed alarm on, and RN notified.    GOALS:   Multidisciplinary Problems       Physical Therapy Goals          Problem: Physical Therapy    Goal Priority Disciplines Outcome Goal Variances Interventions   Physical Therapy Goal     PT, PT/OT Ongoing, Progressing     Description: Goals to be met by: 2023     Patient will increase functional independence with mobility by performin. Supine <> sit with  Supervision/Modified Dauphin  2. Sit to stand transfer with Supervision and Stand-by Assistance  3. Bed to chair transfer with Supervision and Stand-by Assistance using Rolling Walker  4. Gait  x 100 feet with Stand-by Assistance using Rolling Walker.                          History:     Past Medical History:   Diagnosis Date    Addiction to drug     Alcohol abuse     Alcohol abuse, in remission 6/15/2015    14.5 weeks ago; AA weekly    Anemia     Anxiety 6/15/2015    Behavioral problem     Bipolar disorder     Bipolar disorder in remission 6/15/2015    Cirrhosis, Laennec's 6/15/2015    Depression     Encounter for blood transfusion     Epistaxis 6/15/2015    Fatigue     Glaucoma     Hematuria     Hepatic encephalopathy 6/15/2015    Hepatic enlargement     History of psychiatric care     History of psychiatric hospitalization     History of seizure 6/15/2015    1    hx of intentional Tylenol overdose 6/15/2015    2005- situational and hx of bipolar    Hx of psychiatric care     Macrocytic anemia 2015    6 units PRBC since 2015    Jeana     Osteoarthritis     Other ascites 6/15/2015    Psychiatric exam requested by authority     Psychiatric problem     Psychosis 2019    Renal disorder     Seizures     Self-harming behavior     Suicide attempt     Therapy     Thrombocytopenia 6/15/2015       Past Surgical History:   Procedure Laterality Date    COSMETIC SURGERY      ESOPHAGOGASTRODUODENOSCOPY         Time Tracking:     PT Received  On: 03/11/23  PT Start Time: 1212     PT Stop Time: 1234  PT Total Time (min): 22 min     Billable Minutes: Evaluation 22 03/11/2023

## 2023-03-12 NOTE — PLAN OF CARE
03/12/23 1338   Post-Acute Status   Post-Acute Authorization Placement  (Worker spoke with the patient in reference to skilled placement , she would like to have [facility] re[facility] as her first choice and other facilities close to home as her alternative choices.)   Post-Acute Placement Status Referrals Sent

## 2023-03-13 VITALS
HEART RATE: 69 BPM | HEIGHT: 63 IN | BODY MASS INDEX: 18.29 KG/M2 | TEMPERATURE: 99 F | RESPIRATION RATE: 18 BRPM | OXYGEN SATURATION: 95 % | SYSTOLIC BLOOD PRESSURE: 114 MMHG | WEIGHT: 103.19 LBS | DIASTOLIC BLOOD PRESSURE: 59 MMHG

## 2023-03-13 PROBLEM — R79.1 ELEVATED INR: Status: RESOLVED | Noted: 2023-02-09 | Resolved: 2023-03-13

## 2023-03-13 PROBLEM — N39.0 UTI (URINARY TRACT INFECTION): Status: RESOLVED | Noted: 2021-11-03 | Resolved: 2023-03-13

## 2023-03-13 PROBLEM — E63.8 INADEQUATE DIETARY INTAKE OF PROTEIN: Status: RESOLVED | Noted: 2020-05-19 | Resolved: 2023-03-13

## 2023-03-13 PROBLEM — R19.7 DIARRHEA: Status: RESOLVED | Noted: 2023-02-10 | Resolved: 2023-03-13

## 2023-03-13 PROBLEM — K70.30 ALCOHOLIC CIRRHOSIS: Status: ACTIVE | Noted: 2018-04-27

## 2023-03-13 PROBLEM — E87.6 HYPOKALEMIA: Status: RESOLVED | Noted: 2021-11-03 | Resolved: 2023-03-13

## 2023-03-13 PROBLEM — E83.39 HYPOPHOSPHATEMIA: Status: RESOLVED | Noted: 2023-02-09 | Resolved: 2023-03-13

## 2023-03-13 PROBLEM — E87.20 METABOLIC ACIDOSIS: Status: RESOLVED | Noted: 2023-02-10 | Resolved: 2023-03-13

## 2023-03-13 LAB
ALBUMIN SERPL BCP-MCNC: 2.3 G/DL (ref 3.5–5.2)
ALP SERPL-CCNC: 125 U/L (ref 55–135)
ALT SERPL W/O P-5'-P-CCNC: 16 U/L (ref 10–44)
AMPHETAMINES SERPL QL: NEGATIVE
ANION GAP SERPL CALC-SCNC: 7 MMOL/L (ref 8–16)
AST SERPL-CCNC: 37 U/L (ref 10–40)
BARBITURATES SERPL QL SCN: NEGATIVE
BASOPHILS # BLD AUTO: 0.02 K/UL (ref 0–0.2)
BASOPHILS NFR BLD: 0.6 % (ref 0–1.9)
BENZODIAZ SERPL QL SCN: NEGATIVE
BILIRUB SERPL-MCNC: 2 MG/DL (ref 0.1–1)
BUN SERPL-MCNC: 11 MG/DL (ref 6–20)
BZE SERPL QL: NEGATIVE
CALCIUM SERPL-MCNC: 8.5 MG/DL (ref 8.7–10.5)
CARBOXYTHC SERPL QL SCN: NEGATIVE
CHLORIDE SERPL-SCNC: 108 MMOL/L (ref 95–110)
CO2 SERPL-SCNC: 23 MMOL/L (ref 23–29)
CREAT SERPL-MCNC: 0.8 MG/DL (ref 0.5–1.4)
DIFFERENTIAL METHOD: ABNORMAL
EOSINOPHIL # BLD AUTO: 0.1 K/UL (ref 0–0.5)
EOSINOPHIL NFR BLD: 3 % (ref 0–8)
ERYTHROCYTE [DISTWIDTH] IN BLOOD BY AUTOMATED COUNT: 14.5 % (ref 11.5–14.5)
EST. GFR  (NO RACE VARIABLE): >60 ML/MIN/1.73 M^2
ETHANOL SERPL QL SCN: NEGATIVE
GLUCOSE SERPL-MCNC: 120 MG/DL (ref 70–110)
HCT VFR BLD AUTO: 29.3 % (ref 37–48.5)
HGB BLD-MCNC: 9.9 G/DL (ref 12–16)
IMM GRANULOCYTES # BLD AUTO: 0 K/UL (ref 0–0.04)
IMM GRANULOCYTES NFR BLD AUTO: 0 % (ref 0–0.5)
LYMPHOCYTES # BLD AUTO: 1.3 K/UL (ref 1–4.8)
LYMPHOCYTES NFR BLD: 39 % (ref 18–48)
MCH RBC QN AUTO: 33.9 PG (ref 27–31)
MCHC RBC AUTO-ENTMCNC: 33.8 G/DL (ref 32–36)
MCV RBC AUTO: 100 FL (ref 82–98)
METHADONE SERPL QL SCN: NEGATIVE
MONOCYTES # BLD AUTO: 0.4 K/UL (ref 0.3–1)
MONOCYTES NFR BLD: 12.8 % (ref 4–15)
NEUTROPHILS # BLD AUTO: 1.5 K/UL (ref 1.8–7.7)
NEUTROPHILS NFR BLD: 44.6 % (ref 38–73)
NRBC BLD-RTO: 0 /100 WBC
OPIATES SERPL QL SCN: NEGATIVE
PCP SERPL QL SCN: NEGATIVE
PLATELET # BLD AUTO: 88 K/UL (ref 150–450)
PMV BLD AUTO: 10.6 FL (ref 9.2–12.9)
POTASSIUM SERPL-SCNC: 3.4 MMOL/L (ref 3.5–5.1)
PROPOXYPH SERPL QL: NEGATIVE
PROT SERPL-MCNC: 5.3 G/DL (ref 6–8.4)
RBC # BLD AUTO: 2.92 M/UL (ref 4–5.4)
SODIUM SERPL-SCNC: 138 MMOL/L (ref 136–145)
WBC # BLD AUTO: 3.36 K/UL (ref 3.9–12.7)

## 2023-03-13 PROCEDURE — 80053 COMPREHEN METABOLIC PANEL: CPT | Performed by: STUDENT IN AN ORGANIZED HEALTH CARE EDUCATION/TRAINING PROGRAM

## 2023-03-13 PROCEDURE — 90833 PSYTX W PT W E/M 30 MIN: CPT | Mod: ,,, | Performed by: PSYCHIATRY & NEUROLOGY

## 2023-03-13 PROCEDURE — 97110 THERAPEUTIC EXERCISES: CPT | Mod: CQ

## 2023-03-13 PROCEDURE — 99233 PR SUBSEQUENT HOSPITAL CARE,LEVL III: ICD-10-PCS | Mod: ,,, | Performed by: PSYCHIATRY & NEUROLOGY

## 2023-03-13 PROCEDURE — 99233 SBSQ HOSP IP/OBS HIGH 50: CPT | Mod: ,,, | Performed by: PSYCHIATRY & NEUROLOGY

## 2023-03-13 PROCEDURE — 25000003 PHARM REV CODE 250: Performed by: STUDENT IN AN ORGANIZED HEALTH CARE EDUCATION/TRAINING PROGRAM

## 2023-03-13 PROCEDURE — 63600175 PHARM REV CODE 636 W HCPCS: Performed by: STUDENT IN AN ORGANIZED HEALTH CARE EDUCATION/TRAINING PROGRAM

## 2023-03-13 PROCEDURE — 25000003 PHARM REV CODE 250: Performed by: PSYCHIATRY & NEUROLOGY

## 2023-03-13 PROCEDURE — 36415 COLL VENOUS BLD VENIPUNCTURE: CPT | Performed by: STUDENT IN AN ORGANIZED HEALTH CARE EDUCATION/TRAINING PROGRAM

## 2023-03-13 PROCEDURE — 90833 PR PSYCHOTHERAPY W/PATIENT W/E&M, 30 MIN (ADD ON): ICD-10-PCS | Mod: ,,, | Performed by: PSYCHIATRY & NEUROLOGY

## 2023-03-13 PROCEDURE — 97535 SELF CARE MNGMENT TRAINING: CPT | Mod: CO

## 2023-03-13 PROCEDURE — 96366 THER/PROPH/DIAG IV INF ADDON: CPT

## 2023-03-13 PROCEDURE — 85025 COMPLETE CBC W/AUTO DIFF WBC: CPT | Performed by: STUDENT IN AN ORGANIZED HEALTH CARE EDUCATION/TRAINING PROGRAM

## 2023-03-13 PROCEDURE — 97116 GAIT TRAINING THERAPY: CPT | Mod: CQ

## 2023-03-13 PROCEDURE — G0378 HOSPITAL OBSERVATION PER HR: HCPCS

## 2023-03-13 RX ORDER — QUETIAPINE FUMARATE 50 MG/1
50 TABLET, FILM COATED ORAL NIGHTLY
Qty: 30 TABLET | Refills: 2 | Status: SHIPPED | OUTPATIENT
Start: 2023-03-13 | End: 2023-05-05 | Stop reason: DRUGHIGH

## 2023-03-13 RX ORDER — LITHIUM CARBONATE 150 MG/1
150 CAPSULE ORAL DAILY
Qty: 90 CAPSULE | Refills: 0 | Status: SHIPPED | OUTPATIENT
Start: 2023-03-14 | End: 2023-05-05 | Stop reason: DRUGHIGH

## 2023-03-13 RX ORDER — LITHIUM CARBONATE 150 MG/1
150 CAPSULE ORAL DAILY
Status: DISCONTINUED | OUTPATIENT
Start: 2023-03-13 | End: 2023-03-13 | Stop reason: HOSPADM

## 2023-03-13 RX ORDER — QUETIAPINE FUMARATE 25 MG/1
50 TABLET, FILM COATED ORAL NIGHTLY
Status: DISCONTINUED | OUTPATIENT
Start: 2023-03-13 | End: 2023-03-13 | Stop reason: HOSPADM

## 2023-03-13 RX ORDER — POTASSIUM CHLORIDE 20 MEQ/1
20 TABLET, EXTENDED RELEASE ORAL
Status: DISPENSED | OUTPATIENT
Start: 2023-03-13 | End: 2023-03-13

## 2023-03-13 RX ADMIN — RIFAXIMIN 550 MG: 550 TABLET ORAL at 08:03

## 2023-03-13 RX ADMIN — LACTULOSE 30 G: 20 SOLUTION ORAL at 05:03

## 2023-03-13 RX ADMIN — LEVETIRACETAM 1000 MG: 500 TABLET, FILM COATED ORAL at 08:03

## 2023-03-13 RX ADMIN — FOLIC ACID 1 MG: 1 TABLET ORAL at 08:03

## 2023-03-13 RX ADMIN — THIAMINE HYDROCHLORIDE 250 MG: 100 INJECTION, SOLUTION INTRAMUSCULAR; INTRAVENOUS at 10:03

## 2023-03-13 RX ADMIN — PANTOPRAZOLE SODIUM 40 MG: 40 TABLET, DELAYED RELEASE ORAL at 08:03

## 2023-03-13 RX ADMIN — POTASSIUM CHLORIDE 20 MEQ: 1500 TABLET, EXTENDED RELEASE ORAL at 11:03

## 2023-03-13 RX ADMIN — POTASSIUM CHLORIDE 20 MEQ: 1500 TABLET, EXTENDED RELEASE ORAL at 02:03

## 2023-03-13 RX ADMIN — LITHIUM CARBONATE 150 MG: 150 CAPSULE, GELATIN COATED ORAL at 11:03

## 2023-03-13 RX ADMIN — SODIUM BICARBONATE 1300 MG: 650 TABLET ORAL at 08:03

## 2023-03-13 RX ADMIN — LACTULOSE 30 G: 20 SOLUTION ORAL at 11:03

## 2023-03-13 RX ADMIN — PROPRANOLOL HYDROCHLORIDE 20 MG: 10 TABLET ORAL at 08:03

## 2023-03-13 NOTE — PLAN OF CARE
Pt reports to already be active with Home health and to receive paid caregiver support 7am-7pm. Pt lives at home with her mother and receive service support from her sister Zaria who is emergency contact. Pt able to ambulate and call for help if needed.     SW to send updated home health orders to [facility].        03/13/23 1504   Post-Acute Status   Post-Acute Authorization Home Health   Post-Acute Placement Status Discharge Plan Changed   Home Health Status Set-up Complete/Auth obtained   Discharge Delays None known at this time   Discharge Plan   Discharge Plan A Home with family;Home Health     No future appointments.    SHAHEEN Braga Case Management  [phone]

## 2023-03-13 NOTE — PROGRESS NOTES
"[facility] Medicine Progress Note    Primary Team: [facility] Hospitalist Team B  Attending Physician: Andre Nicholson MD  Resident: Driss Hallman MD  Intern: Bang Neal MD (Nak)    Hospital Day: 3     Chief Complaint: AMS ~1 day    Subjective:   NAEON. VSS on room air. No recorded I&Os overnight. Pt is A&O to self, location, time and situation-specifically expressing insight about discontinuing lactulose outpatient led to this current presentation. No other medical issues/concerns voiced at this time.    Review of Systems   All other systems reviewed and are negative.      Objective:   Last 24 Hour Vital Signs:  BP  Min: 98/56  Max: 115/58  Temp  Av.5 °F (36.9 °C)  Min: 98.2 °F (36.8 °C)  Max: 98.7 °F (37.1 °C)  Pulse  Av.6  Min: 62  Max: 77  Resp  Av.7  Min: 18  Max: 20  SpO2  Av.7 %  Min: 92 %  Max: 96 %  No intake or output data in the 24 hours ending 23 0804     Physical Examination:  Physical Exam  HENT:      Head: Normocephalic and atraumatic.      Nose: Nose normal.      Mouth/Throat:      Mouth: Mucous membranes are moist.      Pharynx: Oropharynx is clear.   Eyes:      General: No scleral icterus.     Pupils: Pupils are equal, round, and reactive to light.   Cardiovascular:      Rate and Rhythm: Normal rate and regular rhythm.      Pulses: Normal pulses.      Heart sounds: Normal heart sounds.   Pulmonary:      Effort: Pulmonary effort is normal.      Breath sounds: Normal breath sounds.   Abdominal:      General: Abdomen is flat. Bowel sounds are normal.      Palpations: Abdomen is soft.      Tenderness: There is no abdominal tenderness.   Musculoskeletal:      Right lower leg: No edema.      Left lower leg: No edema.   Skin:     General: Skin is warm and dry.      Capillary Refill: Capillary refill takes less than 2 seconds.   Neurological:      General: No focal deficit present.      Mental Status: She is alert.      Comments: A&O to self, location, time and situation; neg " asterixis   Psychiatric:         Mood and Affect: Mood normal.         Behavior: Behavior normal.         Thought Content: Thought content normal.      Laboratory:  Recent Labs   Lab 03/10/23  0635 03/11/23  0707 03/11/23  0755 03/12/23  0602 03/13/23  0745   WBC 4.35  --  6.22 4.17 3.36*   HGB 12.3  --  12.8 10.8* 9.9*   HCT 37.4  --  38.7 32.4* 29.3*   *  --  105* 108* 88*   *  --  100* 100* 100*   RDW 14.6*  --  14.8* 14.5 14.5    138  --  138  --    K 4.5 4.0  --  4.2  --    * 116*  --  108  --    CO2 16* 15*  --  24  --    BUN 15 11  --  12  --    CREATININE 0.7 0.7  --  0.7  --    GLU 90 128*  --  112*  --    PROT 6.4 6.2  --  5.6*  --    ALBUMIN 2.7* 2.7*  --  2.5*  --    BILITOT 4.5* 3.5*  --  2.6*  --    AST 41* 35  --  30  --    ALKPHOS 112 119  --  130  --    ALT 19 19  --  19  --        Microbiology Results (last 7 days)       ** No results found for the last 168 hours. **          I have personally reviewed the above laboratory studies.     Imaging:  EKG (my interpretation): sinus adan w/ abnormal EKG changes from previous.    X-Ray Chest 1 View    Result Date: 2/16/2023  EXAMINATION: XR CHEST 1 VIEW CLINICAL HISTORY: infectious w/u; TECHNIQUE: Single frontal view of the chest was performed. COMPARISON: 02/09/2023 FINDINGS: Less complete inspiration, rotate chest left.  Heart top normal size.  Mild generalized prominence of interstitial markings with overall remote appearance.  Chronic pleuroparenchymal change left cardiophrenic angle fat pad.     Chronic interstitial fibrotic change.  No pneumonia or other acute change. Electronically signed by: Darien Bernard MD Date:    02/16/2023 Time:    16:16    X-Ray Chest 1 View    Result Date: 2/9/2023  EXAMINATION: XR CHEST 1 VIEW CLINICAL HISTORY: r/o pneumonia; TECHNIQUE: One view COMPARISON: Comparison is made to 01/25/2023. FINDINGS: Heart size is normal, as is the appearance of the pulmonary vascularity.  Lung zones are  clear, and are free of significant airspace consolidation or volume loss.  No pleural fluid.  No hilar or mediastinal mass lesion.  No pneumothorax.     No significant intrathoracic abnormality.  No significant detrimental interval change in the appearance of the chest since 01/25/2023 is appreciated. Electronically signed by: Americo Reyes MD Date:    02/09/2023 Time:    11:12    CT Head Without Contrast    Result Date: 3/9/2023  EXAMINATION: CT HEAD WITHOUT CONTRAST CLINICAL HISTORY: Mental status change, unknown cause; TECHNIQUE: Multiple sequential 5 mm axial images of the head without contrast.  Coronal and sagittal reformatted imaging from the axial acquisition. COMPARISON: 02/07/2023 FINDINGS: Study is distorted by artifact from motion.  Subcentimeter lipoma along the interhemispheric fissure posteriorly stable Mild ill-defined decreased attenuation supratentorial white matter while nonspecific concerning for underlying chronic ischemic change.  Allowing for artifacts there is no evidence for acute intracranial hemorrhage or sulcal effacement.  The ventricles are normal in size without hydrocephalus.  There is no midline shift or mass effect.  Small lobular opacities maxillary antra with moderate opacification right ethmoid air cells.  Remaining visualized paranasal sinuses and mastoid air cells are clear.  Incidental probable cosmetic implants pre maxillary soft tissues bilaterally.     Allowing for slight motion artifact there is no definite acute intracranial findings specifically without evidence for acute intracranial hemorrhage or sulcal effacement to suggest large territory recent infarction Clinical correlation and further evaluation as warranted. Electronically signed by: Mikey Rodriguez DO Date:    03/09/2023 Time:    15:12    X-Ray Chest AP Portable    Result Date: 3/9/2023  EXAMINATION: XR CHEST AP PORTABLE CLINICAL HISTORY: Altered mental status; TECHNIQUE: Single frontal view of the chest was  performed. COMPARISON: 02/16/2023 FINDINGS: Small lung volumes likely to poor inspiratory effort.  Reduced perihilar and ill-defined lung opacities which may represent resolving vascular congestion and edema.  There is no significant new lung opacity.  No large lung consolidation.  No large effusion or pneumothorax.  Further evaluation as warranted clinically.     Please see above Electronically signed by: Mikey Rodriguez DO Date:    03/09/2023 Time:    13:51    US Abdomen Limited    Result Date: 3/9/2023  EXAMINATION: US ABDOMEN LIMITED CLINICAL HISTORY: Hyperbilirubinemia; TECHNIQUE: Limited abdominal ultrasound. COMPARISON: 02/09/2023, CT 01/21/2021 FINDINGS: Liver: Normal in size, measuring 11.2 cm. Homogeneous echotexture. No focal hepatic lesions. Gallbladder: Multiple echogenic stones are present.  The largest measures 1.2 cm.  Negative sonographic Tang sign.  Gallbladder is contracted with limited visualization.  Mild wall thickening is not excluded.  No pericholecystic fluid.  No wall hypervascularity.  The patient is unable to left lateral decubitus. Biliary system: The common duct is not dilated, measuring 5 mm.  No intrahepatic ductal dilatation. No acute abnormality of the inferior vena cava. The spleen measures 12.7 cm. Limited visualization of the pancreas which is obscured by gas. Miscellaneous: No upper abdominal ascites. Right pleural effusion is noted. No evidence of hydronephrosis of the right kidney on limited visualization. CT with contrast may better characterize.     1. Cholelithiasis. 2. Mild splenomegaly. 3. Right pleural effusion. Electronically signed by: Bora Roldan Date:    03/09/2023 Time:    16:29    US Abdomen Limited    Result Date: 2/9/2023  EXAMINATION: US ABDOMEN LIMITED CLINICAL HISTORY: r/o ascitis; TECHNIQUE: Limited ultrasound evaluation of the abdomen was performed to evaluate for ascites. COMPARISON: Abdominal ultrasound 01/25/2023.  CT abdomen pelvis 01/21/2021.  FINDINGS: Limited sonographic evaluation of the all 4 abdominal quadrants demonstrates no significant ascites. Redemonstrated dilated varices within the right lower quadrant.     No significant ascites. Electronically signed by resident: Wilian Daily Date:    02/09/2023 Time:    10:15 Electronically signed by: Eriberto Holliday Date:    02/09/2023 Time:    10:49    Current Medications:     Scheduled:   ARIPiprazole  10 mg Oral Nightly    folic acid  1 mg Oral Daily    lactulose  30 g Oral Q6H    levETIRAcetam  1,000 mg Oral BID    pantoprazole  40 mg Oral Daily    propranoloL  20 mg Oral Daily    rifAXImin  550 mg Oral BID    sodium bicarbonate  1,300 mg Oral BID    thiamine (VITAMIN B1) IVPB  250 mg Intravenous Daily         Infusions:   sodium chloride 0.9%          PRN:  dextrose 10%, dextrose 10%, dextrose, dextrose, glucagon (human recombinant), melatonin, naloxone, sodium chloride 0.9%    Antibiotics and Day Number of Therapy:  Antibiotics (From admission, onward)      Start     Stop Route Frequency Ordered    03/10/23 1215  rifAXIMin tablet 550 mg         -- Oral 2 times daily 03/10/23 1106           Assessment:     Marely Hamilton is a 58 y.o. female with a PMHx of alcoholic cirrhosis, hepatic encephalopathy, seizure, and bipolar 1 disorder presenting with AMS for an unknown duration at this time. Pt was previously admitted here last month for AMS 2/2 hepatic encephalopathy. Pt admitted to [facility] for the evaluation and management of AMS.     Plan:     Hepatic encephalopathy, resolved  Likely given recent presentation, elevated NH3 55, and acute mental status change  Lactulose titrated to 3 bowel movements daily  A&O to self, location, time and situation at this time    Cirrhosis 2/2 alcohol use disorder  From previous admission w/ MELD-Na & MELD scores: 16  INR 1.6; PT 16; aPTT 33.9  MELD-Na score: 17  MELD score: 17  Holding home Lasix and Spironolactone given concern for dehydration and K>5  "respecively  Continue Propranolol  Low-sodium diet     Bipolar 1 disorder  Home Lithium and Abilify, w/ Seroquel for sleep per chart  Low Lithium levels 0.2  Continue Abilify  Holding home Lithium and Seroquel; PRN melatonin and vistaril for sleep  Currently sees Dr Co[facility] at [facility] for outpatient [facility]ry  Consulted Psych for med management, rec:   Abilify 10 mg at bedtime  Zyprexa 5 mg PO/IM Q8H PRN for non-verbally redirectable agitation  Appreciate recs     Macrocytic anemia  Hx of Macrocytic anemia on previous admission  H&H this admit currently stable and wnl;   B12 & Folate normal  Pending Thiamine  Supplemental Thiamine, Folate, B12    Cholelithiasis without obstruction  U/S Abd showed multiple echogenic stones w/o evidence of obstruction at this time.  AST/ALT stable; ALP trending down     History of seizure  Continue home Keppra     Thrombocytopenia, mild  Plt ~110-113     Non-Anion Gap Metabolic Acidosis  HCO3 18, AG 8, Cl 114  Continue home Bicarb     Ppx: SCD; PPI  Diet: Low Sodium  Code: Full    Disposition: home with PT vs post acute placement for PT    Bang Neal MD (Nak)  [facility] Medicine, PGY-1    \[facility] [place] [facility]\"" Medicine Hospitalist Pager numbers:   [facility] Hospitalist Medicine Team A (Angela/Vianney): [phone]  \[facility] [place] [facility]\"" Hospitalist Medicine Team B (Lyn/Edenilson):  [phone]     "

## 2023-03-13 NOTE — PROGRESS NOTES
PSYCHIATRY INPATIENT PROGRESS NOTE  SUBSEQUENT HOSPITAL VISIT      3/13/2023 9:38 AM   Marely Hamilton   1965   [id number]           DATE OF ADMISSION: 3/9/2023 12:29 PM    [facility]    CURRENT LEGAL STATUS: Patient does not currently meet PEC criteria due to not currently being an imminent threat to self/others and not being gravely disabled 2/2 mental illness at this time.     HISTORY    Per Initial History from Primary Team:   Marely Hamilton is a 58 y.o. female with a PMHx of alcoholic cirrhosis, hepatic encephalopathy, seizure, bipolar 1 disorder. The patient presented on 3/9/2023 for evaluation of AMS. Work-up in the ED notable for elevated NH3, elevated ALP, and U/S abdomen concerning for cholelithiasis w/ R pleural effusion. Ethanol levels and U/A were unremarkable, as well as CTH showed no acute findings. Per ED, pt was given lactulose (not charted at the time this note was created). Of note, pt was previously admitted here last month for AMS 2/2 hepatic encephalopathy.  Pt seen at bedside. Pt provided limited hx given only oriented to self but not oriented to location, situation, or time. Unable to provide HPI or ROS but stated living w/ her mother. Pt was calm, cooperative, and interactive during interview. Pt did not voice any complaints at this time. Pt will be admitted to [facility] for the evaluation and management of AMS.   Collateral Mother (Anastasia Hamilton; [phone]); called around 1930hrs on 3/9/23  No answer; left voice-message  Interval History from Primary Team on 03/13/2023:  NAEON. VSS on room air. No recorded I&Os overnight. Pt is A&O to self, location, time and situation-specifically expressing insight about discontinuing lactulose outpatient led to this current presentation. No other medical issues/concerns voiced at this time.      Chief Complaint / Reason for original Psychiatry Consult: Encephalopathy / AMS      Subjective / Interval Psychiatric History Today (03/13/2023)  "(with Psychiatric ROS below):  Marely Hamilton is a 58 y.o. female with a past medical history of alcoholic cirrhosis, hepatic encephalopathy, and seizure, and a past psychiatric history of Bipolar I Disorder and Alcohol Use Disorder, currently being treated by her inpatient primary team for a principle problem of Hepatic Encephalopathy.  Psychiatry was originally consulted as noted above.  The patient was seen and examined.  The chart was reviewed.  On examination today, the patient was alert and oriented to person, name of place, type of place, city, state, month, year, and situation.  She was CAM-ICU negative for delirium.  She endorses being recently non-compliant with her lactulose "because it tastes so nasty" (counseled the patient on the importance of compliance).  She now appears to be back at or near her neurocognitive baseline.  She endorses that her mood has been stable on her most recent outpatient psychiatric medication regimen from her longstanding psychiatrist (Dr. Dewey Coates) of Lithium, Seroquel, and Abilify (patient endorses recent compliance with Lithium 300 mg PO QHS, Seroquel 100 mg PO QHS, and Abilify 10 mg PO daily ; see collateral below ; she endorses having virtual visits with Dr. Coates every month).  As noted previously, other than some chronic intermittent low mood when thinking about not being able to work anymore as a , she denies any current or recent s/s of depression.  She denies any current or recent s/s of anxiety / panic.  She denies any current or recent s/s consistent with psychosis / crystal.  She denies any current / recent issues with insomnia (slept 7-8 hours last night).  She endorses a good appetite but does endorses some recent mild unintentional weight loss.  She denies any current or recent active or passive SI / HI.  She denies any current AH, VH, TH, delusions, paranoia, or DIGFAST (crystal) s/s.  She denies any adverse effects to her current medication regimen.  " Regarding current medical/physical complaints, she endorses fatigue / weakness.  She denies any other medical complaints at this time.  NAD was observed during the examination.  Psychotherapy was implemented as noted below with a focus on improving mood, medication compliance, and continued total sobriety (denies any alcohol consumption in several years ; Ethanol < 10).  See detailed psych ROS below.  See A/P below.       Collateral:    Per my discussion with patient's longstanding outpatient psychiatrist (Dr. Dewey Coates):  As noted previously, the patient most recently has been stable on a regimen of Lithium 300 mg PO QHS, Seroquel 100 mg PO QHS, and Abilify 10 mg PO daily.  I described her ED presentation, and he stated that his sounds similar to prior admissions for hepatic encephalopathy / delirium as opposed to an acute decompensation of Bipolar Disorder.  His office can be contacted for outpatient psychiatric f/u at discharge from this hospitalization.         Psychiatric Review Of Systems - Currently, the patient is endorsing and/or denying the following:  (patient's endorsements are BOLDED below; if not BOLDED, then patient denied):     Endorses mild intermittent Symptoms of Depression (see HPI above): diminished mood (improving), low motivation, loss of interest/anhedonia, irritability, diminished energy, change in sleep, change in appetite, diminished concentration or cognition (improving) or indecisiveness, PMA/R, excessive guilt or hopelessness or worthlessness, suicidal ideations     Denies issues with Sleep: initiation, maintenance, early morning awakening with inability to return to sleep     Denies Suicidal/Homicidal ideations: active/passive ideations, organized plans, future intentions     Denies Symptoms of psychosis: hallucinations, delusions, disorganized thinking, disorganized behavior or abnormal motor behavior, or negative symptoms (diminshed emotional expression, avolition, anhedonia,  alogia, asociality      Denies Symptoms of crystal or hypomania: elevated, expansive, or irritable mood with increased energy or activity; with inflated self-esteem or grandiosity, decreased need for sleep, increased rate of speech, FOI or racing thoughts, distractibility, increased goal directed activity or PMA, risky/disinhibited behavior     Denies Symptoms of Anxiety: excessive anxiety/worry/fear, more days than not, about numerous issues, difficult to control, with restlessness, fatigue, poor concentration, irritability, muscle tension, sleep disturbance; causes functionally impairing distress      Denies Symptoms of Panic Disorder: recurrent panic attacks, precipitated or un-precipitated, source of worry and/or behavioral changes secondary; with or without agoraphobia     Denies Symptoms of PTSD: h/o trauma; re-experiencing/intrusive symptoms, avoidant behavior, negative alterations in cognition or mood, or hyperarousal symptoms; with or without dissociative symptoms      Denies Symptoms of OCD: obsessions or compulsions      Denies Symptoms of Eating Disorders: anorexia, bulimia or binging     Denies Substance Use: intoxication, withdrawal, tolerance, used in larger amounts or duration than intended, unsuccessful attempts to limit or quit, increased time engaging in or seeking out, cravings or strong desire to use, failure to fulfill obligations, negative consequences in social/interpersonal/occupational,/recreational areas, use in dangerous situations, medical or psychological consequences       [facility] Toolkit ASQ Suicide Screening Tool:  In the past few weeks, have you wished you were dead? Denies   In the past few weeks, have you felt that you or your family would be better off if you were dead? Denies  In the past week, have you been having thoughts about killing yourself? Denies  Have you ever tried to kill yourself? YES (remote hx ; see below)  Are you having thoughts of killing yourself right now? Denies        PSYCHOTHERAPY ADD-ON +10190   30 (16-37*) minutes    Time: 20 minutes  Participants: Met with patient    Therapeutic Intervention Type: behavior modifying psychotherapy, supportive psychotherapy  Why chosen therapy is appropriate versus another modality: relevant to diagnosis, patient responds to this modality, evidence based practice    Target symptoms: mood, medication noncompliance, and hx of alcohol abuse / dependence   Primary focus: improving mood, medication compliance, and continued total sobriety  Psychotherapeutic techniques: supportive and psychodynamic techniques; psycho-education; reality / insight orientation; motivational interviewing; CBT; problem solving techniques and managing life/medical stressors    Outcome monitoring methods: self-report, observation    Patient's response to intervention:  The patient's response to intervention is accepting.    Progress toward goals:  The patient's progress toward goals is fair.      ROS:  General ROS: negative for - chills, fever or night sweats; positive for fatigue / weakness   Ophthalmic ROS: negative for - blurry vision, double vision or eye pain  ENT ROS: negative for - sinus pain, headaches, sore throat or visual changes  Allergy and Immunology ROS: negative for - hives, itchy/watery eyes or nasal congestion  Hematological and Lymphatic ROS: negative for - bleeding problems, bruising, jaundice or pallor  Endocrine ROS: negative for - galactorrhea, hot flashes, mood swings, palpitations or temperature intolerance  Respiratory ROS: negative for - cough, hemoptysis, shortness of breath, tachypnea or wheezing  Cardiovascular ROS: negative for - chest pain, dyspnea on exertion, loss of consciousness, palpitations, rapid heart rate or shortness of breath  Gastrointestinal ROS: negative for - appetite loss, nausea, abdominal pain, blood in stools, change in bowel habits, constipation or diarrhea  Genito-Urinary ROS: negative for - incontinence, nocturia  or pelvic pain  Musculoskeletal ROS: negative for - joint stiffness, joint swelling, joint pain or muscle pain   Neurological ROS: negative for - behavioral changes, confusion, dizziness, memory loss, numbness/tingling or seizures  Dermatological ROS: negative for dry skin, hair changes, pruritus or rash  Psychiatric ROS: see detailed psychiatric ROS above in subjective section       Past Psychiatric History:  Previous Medication Trials: yes, multiple  Previous Psychiatric Hospitalizations: yes, multiple (most recently in 02/2021 at [facility])  Previous Suicide Attempts: 3 previous SA via OD  History of Violence: denies   Outpatient Psychiatrist: Dr. Dewey Coates with [facility]   Hx of Depression: yes  Hx of Anxiety: yes  Hx of Jeana: yes  Hx of Psychosis: denies   Hx of PTSD: denies      Social History:  Marital Status: single  Children: 0 (has two sisters)   Employment Status/Info: on disability  Education: post college graduate work or degree (EDIN from [employer])  Special Ed: denies  : denies  Housing Status: lives with family / mother in [place], [place]    Hobbies/Leisure time: yes   History of phys/sexual abuse: denies  Access to gun: denies     Family Psychiatric History: father with bipolar disorder ; father and sister with alcoholism      Substance Abuse History:  Recreational Drugs: denies  Use of Alcohol: hx of heavy alcohol abuse / dependence with resultant cirrhosis ; endorses total sobriety for multiple years (unable to give specific #) ; endorses hx of complicated withdrawal   Rehab History: yes  Tobacco Use: denies      Legal History:  Past Charges/Incarcerations: prior DUI   Pending charges: denies       Psychosocial Stressors: financial, health, and occupational   Functioning Relationships: good support system in family  Strengths AND Liabilities: Strength: Patient accepts guidance/feedback, Strength: Patient is expressive/articulate., Strength: Patient is motivated for change.,  Strength: Patient has positive support network., Liability: Patient has poor health., Liability: Patient has possible cognitive impairment.      PAST MEDICAL & SURGICAL HISTORY   Past Medical History:   Diagnosis Date    Addiction to drug     Alcohol abuse     Alcohol abuse, in remission 6/15/2015    14.5 weeks ago; AA weekly    Anemia     Anxiety 6/15/2015    Behavioral problem     Bipolar disorder     Bipolar disorder in remission 6/15/2015    Cirrhosis, Laennec's 6/15/2015    Depression     Encounter for blood transfusion     Epistaxis 6/15/2015    Fatigue     Glaucoma     Hematuria     Hepatic encephalopathy 6/15/2015    Hepatic enlargement     History of psychiatric care     History of psychiatric hospitalization     History of seizure 6/15/2015    1    hx of intentional Tylenol overdose 6/15/2015    2005- situational and hx of bipolar    Hx of psychiatric care     Macrocytic anemia 9/18/2015    6 units PRBC since June 2015    Jeana     Osteoarthritis     Other ascites 6/15/2015    Psychiatric exam requested by authority     Psychiatric problem     Psychosis 9/26/2019    Renal disorder     Seizures     Self-harming behavior     Suicide attempt     Therapy     Thrombocytopenia 6/15/2015     Past Surgical History:   Procedure Laterality Date    COSMETIC SURGERY      ESOPHAGOGASTRODUODENOSCOPY         NEUROLOGIC HISTORY  Seizures: yes   Head trauma with LOC: denies   CVA: denies     FAMILY HISTORY   Family History   Problem Relation Age of Onset    Alcohol abuse Father     Suicide Father     Bipolar disorder Father     Alcohol abuse Sister     Hypertension Mother     Alcohol abuse Paternal Grandfather     Drug abuse Neg Hx     Dementia Neg Hx        ALLERGIES   Review of patient's allergies indicates:   Allergen Reactions    Sulfa (sulfonamide antibiotics) Rash    Codeine Nausea And Vomiting       CURRENT MEDICATION REGIMEN   Home Meds:   Prior to Admission medications    Medication Sig Start Date End Date Taking?  Authorizing Provider   folic acid (FOLVITE) 1 MG tablet Take 1 tablet (1 mg total) by mouth once daily. 2/13/23 2/13/24 Yes Seth Noyola MD   levETIRAcetam (KEPPRA) 500 MG Tab Take 2 tablets (1,000 mg total) by mouth 2 (two) times daily. 3/3/23  Yes Robina Esquivel NP   pantoprazole (PROTONIX) 40 MG tablet Take 40 mg by mouth once daily. 12/3/22  Yes Historical Provider   propranoloL (INDERAL) 20 MG tablet Take 20 mg by mouth once daily. 12/3/22  Yes Historical Provider   QUEtiapine (SEROQUEL) 100 MG Tab Take 100 mg by mouth every evening. 12/26/22  Yes Historical Provider   spironolactone (ALDACTONE) 25 MG tablet Take 25 mg by mouth once daily. 12/3/22  Yes Historical Provider   zonisamide (ZONEGRAN) 100 MG Cap Take 100 mg by mouth once daily. 8/12/22  Yes Historical Provider   ARIPiprazole (ABILIFY) 10 MG Tab Take 10 mg by mouth nightly. 11/25/22   Historical Provider   furosemide (LASIX) 20 MG tablet Take 0.5 tablets (10 mg total) by mouth 2 (two) times daily. HOLD UNTIL OTHERWISE DIRECTED BY PRIMARY CARE PROVIDER 2/27/23 2/27/24  Robina Esquivel NP   hydrOXYzine (ATARAX) 50 MG tablet Take 50 mg by mouth every evening. 12/3/22   Historical Provider   lactulose (CHRONULAC) 20 gram/30 mL Soln Take 45 mLs (30 g total) by mouth 3 (three) times daily. 2/14/23 3/16/23  Seth Noyola MD   lithium (LITHOTAB) 300 mg tablet Take 300 mg by mouth nightly. 12/20/22   Historical Provider   multivitamin Tab Take 1 tablet by mouth once daily. 2/2/21   Lucero Castillo MD   sodium bicarbonate 650 MG tablet Take 2 tablets (1,300 mg total) by mouth 2 (two) times daily. 2/14/23 2/14/24  Seth Noyola MD   thiamine 100 MG tablet Take 1 tablet (100 mg total) by mouth once daily. 3/3/23   Robina M. Caire, NP       OTC Meds: denies     Scheduled Meds:    ARIPiprazole  10 mg Oral Nightly    folic acid  1 mg Oral Daily    lactulose  30 g Oral Q6H    levETIRAcetam  1,000 mg Oral BID    pantoprazole  40 mg Oral Daily     propranoloL  20 mg Oral Daily    rifAXImin  550 mg Oral BID    sodium bicarbonate  1,300 mg Oral BID    thiamine (VITAMIN B1) IVPB  250 mg Intravenous Daily      PRN Meds: dextrose 10%, dextrose 10%, dextrose, dextrose, glucagon (human recombinant), melatonin, naloxone, sodium chloride 0.9%   Psychotherapeutics (From admission, onward)      Start     Stop Route Frequency Ordered    03/09/23 2100  ARIPiprazole tablet 10 mg         -- Oral Nightly 03/09/23 1829            LABORATORY DATA   Recent Results (from the past 72 hour(s))   Comprehensive Metabolic Panel (CMP)    Collection Time: 03/11/23  7:07 AM   Result Value Ref Range    Sodium 138 136 - 145 mmol/L    Potassium 4.0 3.5 - 5.1 mmol/L    Chloride 116 (H) 95 - 110 mmol/L    CO2 15 (L) 23 - 29 mmol/L    Glucose 128 (H) 70 - 110 mg/dL    BUN 11 6 - 20 mg/dL    Creatinine 0.7 0.5 - 1.4 mg/dL    Calcium 8.7 8.7 - 10.5 mg/dL    Total Protein 6.2 6.0 - 8.4 g/dL    Albumin 2.7 (L) 3.5 - 5.2 g/dL    Total Bilirubin 3.5 (H) 0.1 - 1.0 mg/dL    Alkaline Phosphatase 119 55 - 135 U/L    AST 35 10 - 40 U/L    ALT 19 10 - 44 U/L    Anion Gap 7 (L) 8 - 16 mmol/L    eGFR >60 >60 mL/min/1.73 m^2   Magnesium    Collection Time: 03/11/23  7:07 AM   Result Value Ref Range    Magnesium 1.6 1.6 - 2.6 mg/dL   Phosphorus    Collection Time: 03/11/23  7:07 AM   Result Value Ref Range    Phosphorus 2.2 (L) 2.7 - 4.5 mg/dL   CBC auto differential    Collection Time: 03/11/23  7:55 AM   Result Value Ref Range    WBC 6.22 3.90 - 12.70 K/uL    RBC 3.87 (L) 4.00 - 5.40 M/uL    Hemoglobin 12.8 12.0 - 16.0 g/dL    Hematocrit 38.7 37.0 - 48.5 %     (H) 82 - 98 fL    MCH 33.1 (H) 27.0 - 31.0 pg    MCHC 33.1 32.0 - 36.0 g/dL    RDW 14.8 (H) 11.5 - 14.5 %    Platelets 105 (L) 150 - 450 K/uL    MPV 9.6 9.2 - 12.9 fL    Immature Granulocytes 0.3 0.0 - 0.5 %    Gran # (ANC) 4.0 1.8 - 7.7 K/uL    Immature Grans (Abs) 0.02 0.00 - 0.04 K/uL    Lymph # 1.2 1.0 - 4.8 K/uL    Mono # 0.9 0.3 - 1.0  K/uL    Eos # 0.1 0.0 - 0.5 K/uL    Baso # 0.03 0.00 - 0.20 K/uL    nRBC 0 0 /100 WBC    Gran % 63.8 38.0 - 73.0 %    Lymph % 18.8 18.0 - 48.0 %    Mono % 14.3 4.0 - 15.0 %    Eosinophil % 2.3 0.0 - 8.0 %    Basophil % 0.5 0.0 - 1.9 %    Differential Method Automated    Comprehensive Metabolic Panel (CMP)    Collection Time: 03/12/23  6:02 AM   Result Value Ref Range    Sodium 138 136 - 145 mmol/L    Potassium 4.2 3.5 - 5.1 mmol/L    Chloride 108 95 - 110 mmol/L    CO2 24 23 - 29 mmol/L    Glucose 112 (H) 70 - 110 mg/dL    BUN 12 6 - 20 mg/dL    Creatinine 0.7 0.5 - 1.4 mg/dL    Calcium 8.5 (L) 8.7 - 10.5 mg/dL    Total Protein 5.6 (L) 6.0 - 8.4 g/dL    Albumin 2.5 (L) 3.5 - 5.2 g/dL    Total Bilirubin 2.6 (H) 0.1 - 1.0 mg/dL    Alkaline Phosphatase 130 55 - 135 U/L    AST 30 10 - 40 U/L    ALT 19 10 - 44 U/L    Anion Gap 6 (L) 8 - 16 mmol/L    eGFR >60 >60 mL/min/1.73 m^2   Magnesium    Collection Time: 03/12/23  6:02 AM   Result Value Ref Range    Magnesium 1.7 1.6 - 2.6 mg/dL   Phosphorus    Collection Time: 03/12/23  6:02 AM   Result Value Ref Range    Phosphorus 3.0 2.7 - 4.5 mg/dL   CBC with Automated Differential    Collection Time: 03/12/23  6:02 AM   Result Value Ref Range    WBC 4.17 3.90 - 12.70 K/uL    RBC 3.23 (L) 4.00 - 5.40 M/uL    Hemoglobin 10.8 (L) 12.0 - 16.0 g/dL    Hematocrit 32.4 (L) 37.0 - 48.5 %     (H) 82 - 98 fL    MCH 33.4 (H) 27.0 - 31.0 pg    MCHC 33.3 32.0 - 36.0 g/dL    RDW 14.5 11.5 - 14.5 %    Platelets 108 (L) 150 - 450 K/uL    MPV 10.5 9.2 - 12.9 fL    Immature Granulocytes 0.2 0.0 - 0.5 %    Gran # (ANC) 1.8 1.8 - 7.7 K/uL    Immature Grans (Abs) 0.01 0.00 - 0.04 K/uL    Lymph # 1.6 1.0 - 4.8 K/uL    Mono # 0.5 0.3 - 1.0 K/uL    Eos # 0.2 0.0 - 0.5 K/uL    Baso # 0.03 0.00 - 0.20 K/uL    nRBC 0 0 /100 WBC    Gran % 44.2 38.0 - 73.0 %    Lymph % 37.9 18.0 - 48.0 %    Mono % 12.7 4.0 - 15.0 %    Eosinophil % 4.3 0.0 - 8.0 %    Basophil % 0.7 0.0 - 1.9 %    Differential Method  Automated    CBC auto differential    Collection Time: 03/13/23  7:45 AM   Result Value Ref Range    WBC 3.36 (L) 3.90 - 12.70 K/uL    RBC 2.92 (L) 4.00 - 5.40 M/uL    Hemoglobin 9.9 (L) 12.0 - 16.0 g/dL    Hematocrit 29.3 (L) 37.0 - 48.5 %     (H) 82 - 98 fL    MCH 33.9 (H) 27.0 - 31.0 pg    MCHC 33.8 32.0 - 36.0 g/dL    RDW 14.5 11.5 - 14.5 %    Platelets 88 (L) 150 - 450 K/uL    MPV 10.6 9.2 - 12.9 fL    Immature Granulocytes 0.0 0.0 - 0.5 %    Gran # (ANC) 1.5 (L) 1.8 - 7.7 K/uL    Immature Grans (Abs) 0.00 0.00 - 0.04 K/uL    Lymph # 1.3 1.0 - 4.8 K/uL    Mono # 0.4 0.3 - 1.0 K/uL    Eos # 0.1 0.0 - 0.5 K/uL    Baso # 0.02 0.00 - 0.20 K/uL    nRBC 0 0 /100 WBC    Gran % 44.6 38.0 - 73.0 %    Lymph % 39.0 18.0 - 48.0 %    Mono % 12.8 4.0 - 15.0 %    Eosinophil % 3.0 0.0 - 8.0 %    Basophil % 0.6 0.0 - 1.9 %    Differential Method Automated    Comprehensive metabolic panel    Collection Time: 03/13/23  7:45 AM   Result Value Ref Range    Sodium 138 136 - 145 mmol/L    Potassium 3.4 (L) 3.5 - 5.1 mmol/L    Chloride 108 95 - 110 mmol/L    CO2 23 23 - 29 mmol/L    Glucose 120 (H) 70 - 110 mg/dL    BUN 11 6 - 20 mg/dL    Creatinine 0.8 0.5 - 1.4 mg/dL    Calcium 8.5 (L) 8.7 - 10.5 mg/dL    Total Protein 5.3 (L) 6.0 - 8.4 g/dL    Albumin 2.3 (L) 3.5 - 5.2 g/dL    Total Bilirubin 2.0 (H) 0.1 - 1.0 mg/dL    Alkaline Phosphatase 125 55 - 135 U/L    AST 37 10 - 40 U/L    ALT 16 10 - 44 U/L    Anion Gap 7 (L) 8 - 16 mmol/L    eGFR >60 >60 mL/min/1.73 m^2      Lab Results   Component Value Date    PHENOBARB <2.0 (L) 01/15/2017         EXAMINATION    VITALS   Vitals:    03/12/23 2326 03/13/23 0039 03/13/23 0505 03/13/23 0751   BP: (!) 104/53  (!) 98/56 (!) 115/58   Pulse: 67 70 75 77   Resp: 18  18 18   Temp: 98.6 °F (37 °C)  98.6 °F (37 °C) 98.3 °F (36.8 °C)   TempSrc:    Oral   SpO2: (!) 94%  (!) 92% (!) 93%   Weight:       Height:          CONSTITUTIONAL  General Appearance: NAD, unremarkable, age appropriate,  "normal weight, seated in chair, calm      MUSCULOSKELETAL  Muscle Strength and Tone: WNL (generalized weakness / fatigue noted)   Abnormal Involuntary Movements: none observed   Gait and Station: Attempted but unable to assess due to medical acuity      PSYCHIATRIC   Behavior/Cooperation:  friendly and cooperative, eye contact normal, calm  Speech:  normal tone, normal pitch, normal volume, slowed, increased latency of response  Language: grossly intact, able to name, able to repeat with spontaneous speech  Mood:  "pretty stable"  Affect:  mildly constricted   Associations: intact; no TRISTAN  Thought Process: Linear and Future-Oriented   Thought Content: denies SI, HI, AH, VH, TH, delusions, or paranoia (no RIS observed)   Sensorium:  Awake  Alert and Oriented: to person, name of place, type of place, city, state, month, year, and situation.   Memory: 3/3 immediate, 1/3 at 5 minutes               Recent: Limited / Intact; able to report intermittent recent events              Remote: Intact; Named 5/5 past presidents   Attention/concentration: Intact. Appropriate for age/education. Able to spell [id number] & [id number].   Similarities: Intact (difference between apple and orange?)  Abstract reasoning: Intact   Fund of Knowledge: Named 5/5 past presidents   Insight: Intact  Judgment: Intact     CAM ICU Delirium Assessment - NEGATIVE     Is the patient aware of the biomedical complications associated with substance abuse and mental illness? yes      MEDICAL DECISION MAKING     ASSESSMENT         Bipolar I Disorder, In Full Remission   Delirium due to Medication Condition (Hepatic Encephalopathy) Without Behavioral Disturbances (resolving / resolved)  Alcohol Use Disorder, Severe, Dependence, In Sustained Remission         RECOMMENDATIONS       - Patient does not currently meet PEC criteria due to not being an imminent threat to self/others and not being gravely disabled 2/2 mental illness at this time.       - After " discussion with patient's longstanding outpatient psychiatrist (Dewey Coates MD), will continue a reduced regimen of Lithium 150 mg PO QHS for mood, Seroquel 50 mg PO QHS for mood, and Abilify 10 mg PO daily for mood while patient is recovering from hepatic encephalopathy / delirium (patient can increase / titrate back to her prior longstanding regimen of Lithium 300 mg PO QHS, Seroquel 100 mg PO QHS, and Abilify 10 mg PO daily after 2-3 days on above-listed reduced regimen) (discussed risks/benefits/alt vs no treatment with patient).     Implement / Continue the below DELIRIUM BEHAVIOR MANAGEMENT:  - Minimize use of restraints; if physical restraints necessary, please utilize medical/chemical prns for agitation (can use Zyprexa 2.5 mg PO/IM q8 hours PRN).  - Keep shades open and room lit during day and room dim at night in order to promote healthy circadian rhythms.  - Encourage family at bedside.  - Keep whiteboard in patient's room current with the date and name of the members of patient's team for easy patient self re-orientation.  - Avoid benzodiazepines, antihistamines, anticholinergics, hypnotics, and minimize opiates while controlling for pain as these medications may exacerbate delirium.      - Patient's most recent resulted labs, imaging, and EKG were reviewed today ; Ammonia elevated at 55 at admission ; Lithium at 0.2 at admission ; UDS pending ; Ethanol < 10     - Psychotherapy was implemented as noted above with a focus on improving mood, medication compliance, and continued total sobriety (denies any alcohol consumption in several years ; Ethanol < 10).     - Please have Hospital CM/SW assist patient with asap outpatient mental health f/u for s/p discharge from this facility (with patient's longstanding psychiatrist, Dewey Coates MD).     - Patient was instructed to call 911 and/or 988, and return to the nearest ED if she begins feeling suicidal, homicidal, or gravely disabled (for s/p this  hospitalization).       - Thank you for this consult         Total time spent with patient and/or managing/coordinating patient's care today (excluding the time spent on psychotherapy): 60 minutes   Time spent on psychotherapy today (as noted above): 20 minutes   Total time for encounter today including psychotherapy: 80 minutes      More than 50% of the time was spent counseling/coordinating care.     Consulting clinician was informed of the encounter and consult note.       STAFF:  Alfredito Aguayo MD  [facility]   3/13/2023

## 2023-03-13 NOTE — PLAN OF CARE
SW reviewed pt chart and noted recs for skilled placement.   SW noted pt baseline supports and hx. SW also noted that weekend SW sent referrals via careport and so far no accepting facilities.    SW noted that due to pt MH hx pt will require Level II state clearance for any nursing home placement. SW to review with team and discuss best plan for pt.   Pt has paid care giving supports and reports to live at home with parents. SW to assess for additional services.     03/13/23 1017   Post-Acute Status   Post-Acute Authorization Placement   Post-Acute Placement Status Referrals Sent   Discharge Delays None known at this time   Discharge Plan   Discharge Plan A Home with family;Home Health     No future appointments.    SHAHEEN Braga Case Management  [phone]

## 2023-03-13 NOTE — PLAN OF CARE
D/C recs noted. Pt to have follow up with PCP and psych. Pharmacist will go over home medications and reasons for medications. VN and bedside nurse to reiterate final discharge instructions.       At time of discharge pt will be transported home by wheelchair van set for 4:30pm . Please check under encounters for ETA and any changes.    DME at discharge: none  Home Health: [facility]     Pt has follow up appointments added to AVS.         03/13/23 1511   Final Note   Assessment Type Final Discharge Note   Anticipated Discharge Disposition Home-Health   What phone number can be called within the next 1-3 days to see how you are doing after discharge? [phone]   Hospital Resources/Appts/Education Provided Appointments scheduled by Navigator/Coordinator   Post-Acute Status   Home Health Status Set-up Complete/Auth obtained   Discharge Delays None known at this time     No future appointments.    SHAHEEN Braga Case Management  [phone]

## 2023-03-13 NOTE — PLAN OF CARE
Problem: Occupational Therapy  Goal: Occupational Therapy Goal  Description: Goals to be met by: 03/11/2023      Patient will increase functional independence with ADLs by performing:    UE Dressing with Supervision.  LE Dressing with Contact Guard Assistance.  Grooming while standing with Stand-by Assistance.  Toileting from toilet with Supervision for hygiene and clothing management.   Toilet transfer to bedside commode over toilet with Supervision.  Increased functional strength to WF for self care.  Upper extremity exercise program x10 reps per handout, with supervision.     Outcome: Ongoing, Progressing     Patient tolerated activity well. Tremulous throughout session and with impaired balance, both static and dynamic. Patient ambulates /c narrow CHETAN, difficulty managing RW around turns/corners. Would benefit from post-acute therapy placement.

## 2023-03-13 NOTE — PT/OT/SLP PROGRESS
Occupational Therapy   Treatment    Name: Marely Hamilton  MRN: [id number]  Admitting Diagnosis:   The primary encounter diagnosis was Hepatic encephalopathy. Diagnoses of Altered mental status, Metabolic acidosis, and Bipolar 1 disorder, depressed, severe were also pertinent to this visit.    Recommendations:     Discharge Recommendations: nursing facility, skilled  Discharge Equipment Recommendations:  walker, rolling, shower chair  Barriers to discharge:   (Increased fall risk)    Assessment:     Marely Hamilton is a 58 y.o. female with a medical diagnosis of The primary encounter diagnosis was Hepatic encephalopathy. Diagnoses of Altered mental status, Metabolic acidosis, and Bipolar 1 disorder, depressed, severe were also pertinent to this visit. Performance deficits affecting function are weakness, impaired endurance, impaired self care skills, impaired functional mobility, gait instability, impaired balance, decreased upper extremity function, decreased lower extremity function, decreased safety awareness, impaired coordination, impaired fine motor, decreased coordination.     Rehab Prognosis:  Good and Fair; patient would benefit from acute skilled OT services to address these deficits and reach maximum level of function.       Plan:     Patient to be seen 3 x/week to address the above listed problems via self-care/home management, therapeutic activities, therapeutic exercises, neuromuscular re-education  Plan of Care Expires: 04/11/23  Plan of Care Reviewed with: patient    Subjective     Pain/Comfort:  Pain Rating 1: 0/10  Pain Rating Post-Intervention 1: 0/10    Objective:     Communicated with: Bernice perkins prior to session.  Patient found up in chair with peripheral IV (chair alarm) upon OT entry to room.    General Precautions: Standard, fall    Orthopedic Precautions:N/A  Braces: N/A  Respiratory Status: Room air    Functional Mobility/Transfers:  Patient completed Sit <> Stand Transfer with contact  guard assistance  with  rolling walker   Functional Mobility: CGA/[place] using RW in room; some difficulty /c RW mgmt around turns and corners    Activities of Daily Living:  Grooming: set-up and CGA for balance in stance    Lower Body Dressing: stand by assistance patient able to pull B socks up from seated position in chair    AMPAC 6 Click ADL: 19    Treatment & Education:  Patient alert and agreeable to therapy. Required VCs to scoot self to edge of chair prior to stand. Patient ambulated in room as above; some difficulty /c RW mgmt around turns and corners which required VCs to correct. Narrow CHETAN throughout gait, along /c tremors and impaired safety awareness make patient an increased falls risk. Patient completed oral care at sink (noted patient required cues for sequencing - using a cup to rinse; left toothbrush un-rinsed after use); facial hygiene and hair grooming. Patient returned to chair level and set-up /c lunch. Nsg present for meds as well.    Patient left up in chair with all lines intact, call button in reach, chair alarm on, and nsg present    GOALS:   Multidisciplinary Problems       Occupational Therapy Goals          Problem: Occupational Therapy    Goal Priority Disciplines Outcome Interventions   Occupational Therapy Goal     OT, PT/OT Ongoing, Progressing    Description: Goals to be met by: 03/11/2023      Patient will increase functional independence with ADLs by performing:    UE Dressing with Supervision.  LE Dressing with Contact Guard Assistance.  Grooming while standing with Stand-by Assistance.  Toileting from toilet with Supervision for hygiene and clothing management.   Toilet transfer to bedside commode over toilet with Supervision.  Increased functional strength to WFL for self care.  Upper extremity exercise program x10 reps per handout, with supervision.                          Time Tracking:     OT Date of Treatment: 03/13/23  OT Start Time: 1109  OT Stop Time: 1134  OT Total  Time (min): 25 min    Billable Minutes:Self Care/Home Management 25    OT/DC: DC     Number of DC visits since last OT visit: 1    3/13/2023

## 2023-03-13 NOTE — PLAN OF CARE
Problem: Adult Inpatient Plan of Care  Goal: Plan of Care Review  Outcome: Ongoing, Progressing  Goal: Patient-Specific Goal (Individualized)  Outcome: Ongoing, Progressing  Goal: Absence of Hospital-Acquired Illness or Injury  Outcome: Ongoing, Progressing  Goal: Optimal Comfort and Wellbeing  Outcome: Ongoing, Progressing  Goal: Readiness for Transition of Care  Outcome: Ongoing, Progressing     Problem: Fall Injury Risk  Goal: Absence of Fall and Fall-Related Injury  Outcome: Ongoing, Progressing     Problem: Confusion Acute  Goal: Optimal Cognitive Function  Outcome: Ongoing, Progressing     Problem: Seizure Disorder Comorbidity  Goal: Maintenance of Seizure Control  Outcome: Ongoing, Progressing

## 2023-03-13 NOTE — PLAN OF CARE
VN reviewed discharge instructions with pt. Using teach back method.  AVS printed and handed to pt by bedside nurse.  Reviewed follow-up appointments, medications, diet, and importance of medication compliance.  Reviewed home care instructions, treatment plan, self-management, and when to seek medical attention.  Allowed time for questions.  All questions answered.  Patient verbalized complete understanding of discharge instructions and voices no concerns.     Discharge instructions complete.  Bedside delivery done. Pt waiting on wc van. .  Bedside nurse notified.

## 2023-03-13 NOTE — PT/OT/SLP PROGRESS
Physical Therapy Treatment    Patient Name:  Marely Hamilton   MRN:  [id number]    Recommendations:     Discharge Recommendations: nursing facility, skilled  Discharge Equipment Recommendations: walker, rolling, shower chair  Barriers to discharge:  decreased mobility,strength and endurance    Assessment:     Marely Hamilton is a 58 y.o. female admitted with a medical diagnosis of <principal problem not specified>.  She presents with the following impairments/functional limitations: weakness, impaired endurance, impaired functional mobility, gait instability, impaired balance, impaired cognition, decreased ROM, impaired coordination,pt with improving status and some decreased safety awareness,pt will benefit from continuing PT services upon discharge.    Rehab Prognosis: Fair; patient would benefit from acute skilled PT services to address these deficits and reach maximum level of function.    Recent Surgery: * No surgery found *      Plan:     During this hospitalization, patient to be seen 5 x/week to address the identified rehab impairments via gait training, therapeutic activities, therapeutic exercises, neuromuscular re-education and progress toward the following goals:    Plan of Care Expires:  04/11/23    Subjective     Chief Complaint: n/a  Patient/Family Comments/goals: pt agreeable to up in chair.  Pain/Comfort:  Pain Rating 1: 0/10      Objective:     Communicated with nsg prior to session.  Patient found supine with bed alarm, peripheral IV upon PT entry to room.     General Precautions: Standard, fall  Orthopedic Precautions: N/A  Braces: N/A  Respiratory Status: Room air     Functional Mobility:  Bed Mobility:     Supine to Sit: modified independence  Transfers:     Sit to Stand:  modified independence and supervision with rolling walker  Gait: amb ~65' X 2 with RW and SBA/CGA  Balance: fair standing balance with RW      AM-PAC 6 CLICK MOBILITY  Turning over in bed (including adjusting bedclothes, sheets  and blankets)?: 4  Sitting down on and standing up from a chair with arms (e.g., wheelchair, bedside commode, etc.): 3  Moving from lying on back to sitting on the side of the bed?: 4  Moving to and from a bed to a chair (including a wheelchair)?: 3  Need to walk in hospital room?: 3  Climbing 3-5 steps with a railing?: 2  Basic Mobility Total Score: 19       Treatment & Education: le supine ex's x 10-12 reps inc: ap,hs,abd/add,slr.      Patient left up in chair with all lines intact, call button in reach, bed alarm on, and nsg notified..    GOALS: see general POC  Multidisciplinary Problems       Physical Therapy Goals          Problem: Physical Therapy    Goal Priority Disciplines Outcome Goal Variances Interventions   Physical Therapy Goal     PT, PT/OT Ongoing, Progressing     Description: Goals to be met by: 2023     Patient will increase functional independence with mobility by performin. Supine <> sit with  Supervision/Modified [place]  2. Sit to stand transfer with Supervision and Stand-by Assistance  3. Bed to chair transfer with Supervision and Stand-by Assistance using Rolling Walker  4. Gait  x 100 feet with Stand-by Assistance using Rolling Walker.                          Time Tracking:     PT Received On: 23  PT Start Time: 823     PT Stop Time: 848  PT Total Time (min): 25 min     Billable Minutes: Gait Training 15 and Therapeutic Exercise 10    Treatment Type: Treatment  PT/PTA: PTA     Number of PTA visits since last PT visit: 2023

## 2023-03-13 NOTE — NURSING
Pt AAO. Medication administered per MAR. Pt OOB, ambulating to bathroom with 1:1 assist. Tele sitter utilized in pt room. Pt safety maintained, bed in lowest position, locked, and bed alarms set. Pt instructed to call when need, verbalized understanding. Call light within pt's reach.

## 2023-03-13 NOTE — PLAN OF CARE
"[facility] - [facility]      HOME HEALTH ORDERS  FACE TO FACE ENCOUNTER    Patient Name: Marely Hamilton  YOB: 1965    PCP: Viktor Ross MD   PCP Address: [address] [facility] [address] / [place]  PCP Phone Number: [phone]  PCP Fax: [phone]    Encounter Date: 3/9/23    Admit to Home Health    Diagnoses:  Active Hospital Problems    Diagnosis  POA    *Hepatic encephalopathy [K76.82]  Yes    Bipolar 1 disorder [F31.9]  Yes    Alcoholic cirrhosis [K70.30]  Yes     Last Assessment & Plan:   Formatting of this note might be different from the original.  This is reportedly due to tylenol OD and alcohol abuse per her sister. She has severe disease with elevated INR and MELD score of 25. She is at her baseline per her PCP. She was continued on rifaximin and encouraged to take lactulose. She was started on propranolol temporary for significant tachycardia. She is reportedly followed by Dr. Colt PATTEN, at [facility].  Formatting of this note might be different from the original.  Last Assessment & Plan:   Formatting of this note might be different from the original.  This is reportedly due to tylenol OD and alcohol abuse per her sister. She has severe disease with elevated INR and MELD score of 25. She is at her baseline per her PCP. She was continued on rifaximin and encouraged to take lactulose. She was started on propranolol temporary for significant tachycardia. She is reportedly followed by Dr. Colt PATTEN, at [facility].      Chronic liver failure [K72.11]  Yes    Thrombocytopenia [D69.6]  Yes    History of seizure [Z87.898]  Yes     One seizure 2013- "low blood sugar from a starvation diet"      Glaucoma [H40.9]  Yes    Alcohol abuse, in remission [F10.11]  Yes     Has addiction psych clearance from Dr Crain for liver tx        Resolved Hospital Problems   No resolved problems to display.       Follow Up Appointments:  No future appointments.    Allergies:  Review of patient's " allergies indicates:   Allergen Reactions    Sulfa (sulfonamide antibiotics) Rash    Codeine Nausea And Vomiting       Medications: Review discharge medications with patient and family and provide education.    I have seen and examined this patient within the last 30 days. My clinical findings that support the need for the home health skilled services and home bound status are the following:no   Weakness/numbness causing balance and gait disturbance due to Liver Disease making it taxing to leave home.  Requiring assistive device to leave home due to unsteady gait caused by  Liver Disease.  Patient with medication mismanagement issues requiring home bound status as evidenced by  Poor understanding of medication regimen/dosage and Poor adherence to medication regimen/dosage.     Diet:   cardiac diet    Referrals/ Consults  Physical Therapy to evaluate and treat. Evaluate for home safety and equipment needs; Establish/upgrade home exercise program. Perform / instruct on therapeutic exercises, gait training, transfer training, and Range of Motion.  Occupational Therapy to evaluate and treat. Evaluate home environment for safety and equipment needs. Perform/Instruct on transfers, ADL training, ROM, and therapeutic exercises.  Aide to provide assistance with personal care, ADLs, and vital signs.    Activities:   activity as tolerated    Nursing:   Agency to admit patient within 24 hours of hospital discharge unless specified on physician order or at patient request    SN to complete comprehensive assessment including routine vital signs. Instruct on disease process and s/s of complications to report to MD. Review/verify medication list sent home with the patient at time of discharge  and instruct patient/caregiver as needed. Frequency may be adjusted depending on start of care date.     Skilled nurse to perform up to 3 visits PRN for symptoms related to diagnosis    Notify MD if SBP > 160 or < 90; DBP > 90 or < 50; HR >  120 or < 50; Temp > 101; O2 < 88%;    Ok to schedule additional visits based on staff availability and patient request on consecutive days within the home health episode.    Home Health Aide:  Physical Therapy Three times weekly and Occupational Therapy Three times weekly    Wound Care Orders  no    I certify that this patient is confined to her home and needs physical therapy.    Driss Hallman MD  [facility] Internal Medicine HO-3  [facility] Hospitalist Team B    [facility] Medicine Hospitalist Pager numbers:   [facility] Hospitalist Medicine Team A (Angela/Vianney): [phone]  [facility] Hospitalist Medicine Team B (Lyn/Edenilson):  [phone]

## 2023-03-13 NOTE — DISCHARGE SUMMARY
"[facility] Medicine Discharge Summary    Primary Team: [facility] Hospitalist Team B  Attending Physician: Andre Nicholson MD  Resident: Driss Hallman MD  Intern: Bang Neal MD (Nak)    Date of Admit: 3/9/2023  Date of Discharge: 3/13/2023    Discharge to: Home  Condition: Stable    Discharge Diagnoses     Patient Active Problem List   Diagnosis    Cirrhosis, Laennec's    Thrombocytopenia    History of seizure    Glaucoma    hx of intentional Tylenol overdose    Alcohol abuse, in remission    Anemia    Chronic liver failure    Malnutrition    Hepatic encephalopathy    Bipolar 1 disorder, depressed, severe    Elevated LFTs    Bipolar 1 disorder    Bipolar disorder, manic    SVT (supraventricular tachycardia)    Alcoholic cirrhosis       Consultants and Procedures     Consultants:  Psychiatry    Procedures:   None    Imaging:  See image tab    Brief History of Present Illness & Summary of Hospital Course      Marely Hamilton is a 58 y.o.female with a PMHx of alcoholic cirrhosis, hepatic encephalopathy, seizure, and bipolar 1 disorder who presented on 3/9/2023 for AMS for an unknown duration of time. Work-up in the ED notable for elevated NH3, elevated ALP, and U/S abdomen concerning for cholelithiasis w/ R pleural effusion. U/A, UDS, and Ethanol levels were unremarkable, as well as CTH that showed no acute findings. Pt was restarted on home meds-including lactulose-with the addition of rifaximin, showing clinical improvements in hyperammonemia. Psych consulted for med rec management while inpatient. PT/OT evaluated pt prior to discharge recommending acute skilled PT (due to recent SNF placement, insurance declined) or discharged back home w/ HH PT/OT w/ DMEs of RW and shower chair. Discussed latter plan w/ pt and mother, which they understood and were in agreement. On the day of discharge, patient was afebrile with stable vital signs and will be discharged in stable condition to home with close follow up. "     For the full HPI please refer to the History & Physical from this admission.    Discharge Vitals and Physical Exam:   Last 24 Hour Vital Signs:  BP  Min: 98/56  Max: 115/58  Temp  Av.5 °F (36.9 °C)  Min: 98.2 °F (36.8 °C)  Max: 98.7 °F (37.1 °C)  Pulse  Av.8  Min: 64  Max: 77  Resp  Av.3  Min: 18  Max: 20  SpO2  Av.5 %  Min: 92 %  Max: 95 %  Body mass index is 18.28 kg/m².  No intake/output data recorded.    Physical Examination:  HENT:      Head: Normocephalic and atraumatic.      Nose: Nose normal.      Mouth/Throat:      Mouth: Mucous membranes are moist.      Pharynx: Oropharynx is clear.   Eyes:      General: No scleral icterus.     Pupils: Pupils are equal, round, and reactive to light.   Cardiovascular:      Rate and Rhythm: Normal rate and regular rhythm.      Pulses: Normal pulses.      Heart sounds: Normal heart sounds.   Pulmonary:      Effort: Pulmonary effort is normal.      Breath sounds: Normal breath sounds.   Abdominal:      General: Abdomen is flat. Bowel sounds are normal.      Palpations: Abdomen is soft.      Tenderness: There is no abdominal tenderness.   Musculoskeletal:      Right lower leg: No edema.      Left lower leg: No edema.   Skin:     General: Skin is warm and dry.      Capillary Refill: Capillary refill takes less than 2 seconds.   Neurological:      General: No focal deficit present.      Mental Status: She is alert.      Comments: A&O to self, location, time and situation; neg asterixis   Psychiatric:         Mood and Affect: Mood normal.         Behavior: Behavior normal.         Thought Content: Thought content normal.     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 Per Psych:   Abilify 10 mg at bedtime  Zyprexa 5 mg PO/IM Q8H PRN for non-verbally redirectable agitation  Appreciate recs  Follow-up o/p w/ Dr Coates.     Macrocytic anemia  Hx of Macrocytic anemia on previous admission  H&H this admit currently stable and wnl;   B12 & Folate normal  Pending Thiamine  Supplemental Thiamine, Folate, B12  Follow-up o/p w/ PCP     Cholelithiasis without obstruction  U/S Abd showed multiple echogenic stones w/o evidence of obstruction at this time.  AST/ALT stable; ALP trending down  CTM & follow-up o/p w/ PCP     History of seizure  Continue home Keppra  CTM & follow-up o/p w/ PCP     Thrombocytopenia, mild  Plt ~110-113  CTM & follow-up o/p w/ PCP     Non-Anion Gap Metabolic Acidosis  HCO3 18, AG 8, Cl 114  Continue home Bicarb  Follow-up o/p w/ PCP     Discharge Medications        Medication List        START taking these medications      rifAXIMin 550 mg Tab  Commonly known as: XIFAXAN  Take 1 tablet (550 mg total) by mouth 2 (two) times daily.            CHANGE how you take these medications      QUEtiapine 50 MG tablet  Commonly known as: SEROQUEL  Take 1 tablet (50 mg total) by mouth every evening.  What changed:   medication strength  how much to take            CONTINUE taking these medications      ARIPiprazole 10 MG Tab  Commonly known as: ABILIFY     folic acid 1 MG tablet  Commonly known as: FOLVITE  Take 1  "tablet (1 mg total) by mouth once daily.     furosemide 20 MG tablet  Commonly known as: LASIX  Take 0.5 tablets (10 mg total) by mouth 2 (two) times daily. HOLD UNTIL OTHERWISE DIRECTED BY PRIMARY CARE PROVIDER     lactulose 20 gram/30 mL Soln  Commonly known as: CHRONULAC  Take 45 mLs (30 g total) by mouth 3 (three) times daily.     levETIRAcetam 500 MG Tab  Commonly known as: KEPPRA  Take 2 tablets (1,000 mg total) by mouth 2 (two) times daily.     multivitamin Tab  Take 1 tablet by mouth once daily.     pantoprazole 40 MG tablet  Commonly known as: PROTONIX     propranoloL 20 MG tablet  Commonly known as: INDERAL     sodium bicarbonate 650 MG tablet  Take 2 tablets (1,300 mg total) by mouth 2 (two) times daily.     spironolactone 25 MG tablet  Commonly known as: ALDACTONE     thiamine 100 MG tablet  Take 1 tablet (100 mg total) by mouth once daily.     zonisamide 100 MG Cap  Commonly known as: ZONEGRAN            STOP taking these medications      hydrOXYzine 50 MG tablet  Commonly known as: ATARAX     lithium 300 mg tablet  Commonly known as: LITHOTAB               Where to Get Your Medications        These medications were sent to [facility]  [address], [place] [address]      Hours: Mon-Fri, 8a-5:30p Phone: [phone]   QUEtiapine 50 MG tablet  rifAXIMin 550 mg Tab          Discharge Information:   Diet:  Low sodium    Physical Activity:  As tolerated             Instructions:  1. Take all medications as prescribed  2. Keep all follow-up appointments  3. Return to the hospital or call your primary care physicians if any worsening symptoms such as fever, chest pain, shortness of breath, return of symptoms, or any other concerns.    Follow-Up Appointments:  PCP  Psychiatry    Bang Neal MD (Nak)  [facility], PGY-1    "

## 2023-03-13 NOTE — NURSING
Telemetry and PIV removed for discharge. Medications delivered to bedside. AVS given to patient. VN notified.

## 2023-03-13 NOTE — PLAN OF CARE
Problem: Physical Therapy  Goal: Physical Therapy Goal  Description: Goals to be met by: 2023     Patient will increase functional independence with mobility by performin. Supine <> sit with  Supervision/Modified Empire  2. Sit to stand transfer with Supervision and Stand-by Assistance  3. Bed to chair transfer with Supervision and Stand-by Assistance using Rolling Walker  4. Gait  x 100 feet with Stand-by Assistance using Rolling Walker.     Outcome: Ongoing, Progressing

## 2023-03-14 ENCOUNTER — NURSE TRIAGE (OUTPATIENT)
Dept: ADMINISTRATIVE | Facility: CLINIC | Age: 58
End: 2023-03-14
Payer: MEDICARE

## 2023-03-14 LAB — VIT B1 BLD-MCNC: >160 UG/L (ref 38–122)

## 2023-03-14 NOTE — TELEPHONE ENCOUNTER
Pt reports was discharged from hospital yesterday, was given Lithium to take, but not rifaxmin. Pt is confused as to if she is to take both meds or not. Discharge AVS states she is to be taking both meds, confirmed with the on call MD for [facility] primary care, Dr. Hernandez. Pt informed that a prescription for rifaxmin was sent to [facility] yesterday. Pt encouraged to call back with any worsening symptoms or questions and verbalized understanding.    Reason for Disposition   [1] Caller has URGENT medicine question about med that PCP or specialist prescribed AND [2] triager unable to answer question    Protocols used: Medication Question Call-A-

## 2023-03-17 ENCOUNTER — DOCUMENT SCAN (OUTPATIENT)
Dept: HOME HEALTH SERVICES | Facility: HOSPITAL | Age: 58
End: 2023-03-17
Payer: MEDICARE

## 2023-03-27 ENCOUNTER — DOCUMENT SCAN (OUTPATIENT)
Dept: HOME HEALTH SERVICES | Facility: HOSPITAL | Age: 58
End: 2023-03-27
Payer: MEDICARE

## 2023-04-18 PROBLEM — E87.20 METABOLIC ACIDOSIS: Status: ACTIVE | Noted: 2023-04-18

## 2023-04-18 PROBLEM — K74.60 HEPATIC CIRRHOSIS: Status: ACTIVE | Noted: 2023-04-18

## 2023-05-05 ENCOUNTER — HOSPITAL ENCOUNTER (OUTPATIENT)
Facility: HOSPITAL | Age: 58
Discharge: HOME | End: 2023-05-07
Attending: EMERGENCY MEDICINE | Admitting: INTERNAL MEDICINE
Payer: MEDICARE

## 2023-05-05 DIAGNOSIS — F31.4 BIPOLAR 1 DISORDER, DEPRESSED, SEVERE: ICD-10-CM

## 2023-05-05 DIAGNOSIS — R30.0 DYSURIA: ICD-10-CM

## 2023-05-05 DIAGNOSIS — K76.82 HEPATIC ENCEPHALOPATHY: Primary | ICD-10-CM

## 2023-05-05 DIAGNOSIS — K70.30 CIRRHOSIS, LAENNEC'S: ICD-10-CM

## 2023-05-05 DIAGNOSIS — R29.818 OTHER SYMPTOMS AND SIGNS INVOLVING THE NERVOUS SYSTEM: ICD-10-CM

## 2023-05-05 DIAGNOSIS — R41.82 ALTERED MENTAL STATUS: ICD-10-CM

## 2023-05-05 LAB
ALBUMIN SERPL BCP-MCNC: 2.3 G/DL (ref 3.5–5.2)
ALP SERPL-CCNC: 114 U/L (ref 55–135)
ALT SERPL W/O P-5'-P-CCNC: 22 U/L (ref 10–44)
AMMONIA PLAS-SCNC: 67 UMOL/L (ref 10–50)
AMPHET+METHAMPHET UR QL: NEGATIVE
ANION GAP SERPL CALC-SCNC: 4 MMOL/L (ref 8–16)
APTT PPP: 30.1 SEC (ref 21–32)
AST SERPL-CCNC: 42 U/L (ref 10–40)
BARBITURATES UR QL SCN>200 NG/ML: NEGATIVE
BASOPHILS # BLD AUTO: 0.01 K/UL (ref 0–0.2)
BASOPHILS NFR BLD: 0.2 % (ref 0–1.9)
BENZODIAZ UR QL SCN>200 NG/ML: NEGATIVE
BILIRUB SERPL-MCNC: 2.6 MG/DL (ref 0.1–1)
BILIRUB UR QL STRIP: NEGATIVE
BUN SERPL-MCNC: 14 MG/DL (ref 6–20)
BZE UR QL SCN: NEGATIVE
CALCIUM SERPL-MCNC: 8.8 MG/DL (ref 8.7–10.5)
CANNABINOIDS UR QL SCN: NEGATIVE
CHLORIDE SERPL-SCNC: 114 MMOL/L (ref 95–110)
CK SERPL-CCNC: 47 U/L (ref 20–180)
CLARITY UR: CLEAR
CO2 SERPL-SCNC: 21 MMOL/L (ref 23–29)
COLOR UR: YELLOW
CREAT SERPL-MCNC: 0.6 MG/DL (ref 0.5–1.4)
CREAT UR-MCNC: 103.9 MG/DL (ref 15–325)
DIFFERENTIAL METHOD: ABNORMAL
EOSINOPHIL # BLD AUTO: 0.2 K/UL (ref 0–0.5)
EOSINOPHIL NFR BLD: 4.4 % (ref 0–8)
ERYTHROCYTE [DISTWIDTH] IN BLOOD BY AUTOMATED COUNT: 16.7 % (ref 11.5–14.5)
EST. GFR  (NO RACE VARIABLE): >60 ML/MIN/1.73 M^2
ETHANOL SERPL-MCNC: <10 MG/DL
GLUCOSE SERPL-MCNC: 138 MG/DL (ref 70–110)
GLUCOSE UR QL STRIP: NEGATIVE
HCT VFR BLD AUTO: 35.9 % (ref 37–48.5)
HGB BLD-MCNC: 11.4 G/DL (ref 12–16)
HGB UR QL STRIP: NEGATIVE
IMM GRANULOCYTES # BLD AUTO: 0.02 K/UL (ref 0–0.04)
IMM GRANULOCYTES NFR BLD AUTO: 0.4 % (ref 0–0.5)
INR PPP: 1.7 (ref 0.8–1.2)
KETONES UR QL STRIP: NEGATIVE
LACTATE SERPL-SCNC: 1.9 MMOL/L (ref 0.5–2.2)
LACTATE SERPL-SCNC: 2.9 MMOL/L (ref 0.5–2.2)
LEUKOCYTE ESTERASE UR QL STRIP: NEGATIVE
LIPASE SERPL-CCNC: 42 U/L (ref 4–60)
LITHIUM SERPL-SCNC: 0.4 MMOL/L (ref 0.6–1.2)
LYMPHOCYTES # BLD AUTO: 1.1 K/UL (ref 1–4.8)
LYMPHOCYTES NFR BLD: 21.8 % (ref 18–48)
MAGNESIUM SERPL-MCNC: 1.8 MG/DL (ref 1.6–2.6)
MCH RBC QN AUTO: 31.9 PG (ref 27–31)
MCHC RBC AUTO-ENTMCNC: 31.8 G/DL (ref 32–36)
MCV RBC AUTO: 101 FL (ref 82–98)
METHADONE UR QL SCN>300 NG/ML: NEGATIVE
MONOCYTES # BLD AUTO: 0.5 K/UL (ref 0.3–1)
MONOCYTES NFR BLD: 11.2 % (ref 4–15)
NEUTROPHILS # BLD AUTO: 3 K/UL (ref 1.8–7.7)
NEUTROPHILS NFR BLD: 62 % (ref 38–73)
NITRITE UR QL STRIP: NEGATIVE
NRBC BLD-RTO: 0 /100 WBC
OPIATES UR QL SCN: NEGATIVE
PCP UR QL SCN>25 NG/ML: NEGATIVE
PH UR STRIP: 6 [PH] (ref 5–8)
PLATELET # BLD AUTO: 88 K/UL (ref 150–450)
PMV BLD AUTO: 9.7 FL (ref 9.2–12.9)
POTASSIUM SERPL-SCNC: 3.4 MMOL/L (ref 3.5–5.1)
PROT SERPL-MCNC: 5.3 G/DL (ref 6–8.4)
PROT UR QL STRIP: NEGATIVE
PROTHROMBIN TIME: 17.7 SEC (ref 9–12.5)
RBC # BLD AUTO: 3.57 M/UL (ref 4–5.4)
SODIUM SERPL-SCNC: 139 MMOL/L (ref 136–145)
SP GR UR STRIP: 1.02 (ref 1–1.03)
TOXICOLOGY INFORMATION: NORMAL
TROPONIN I SERPL DL<=0.01 NG/ML-MCNC: <0.006 NG/ML (ref 0–0.03)
TSH SERPL DL<=0.005 MIU/L-ACNC: 0.72 UIU/ML (ref 0.4–4)
URN SPEC COLLECT METH UR: ABNORMAL
UROBILINOGEN UR STRIP-ACNC: >=8 EU/DL
WBC # BLD AUTO: 4.82 K/UL (ref 3.9–12.7)

## 2023-05-05 PROCEDURE — 80053 COMPREHEN METABOLIC PANEL: CPT | Performed by: EMERGENCY MEDICINE

## 2023-05-05 PROCEDURE — G0378 HOSPITAL OBSERVATION PER HR: HCPCS

## 2023-05-05 PROCEDURE — 83690 ASSAY OF LIPASE: CPT | Performed by: EMERGENCY MEDICINE

## 2023-05-05 PROCEDURE — 84443 ASSAY THYROID STIM HORMONE: CPT | Performed by: EMERGENCY MEDICINE

## 2023-05-05 PROCEDURE — 85610 PROTHROMBIN TIME: CPT | Performed by: EMERGENCY MEDICINE

## 2023-05-05 PROCEDURE — 93010 EKG 12-LEAD: ICD-10-PCS | Mod: ,,, | Performed by: INTERNAL MEDICINE

## 2023-05-05 PROCEDURE — 93005 ELECTROCARDIOGRAM TRACING: CPT

## 2023-05-05 PROCEDURE — 80307 DRUG TEST PRSMV CHEM ANLYZR: CPT | Performed by: EMERGENCY MEDICINE

## 2023-05-05 PROCEDURE — 25000003 PHARM REV CODE 250

## 2023-05-05 PROCEDURE — 82550 ASSAY OF CK (CPK): CPT | Performed by: EMERGENCY MEDICINE

## 2023-05-05 PROCEDURE — 81003 URINALYSIS AUTO W/O SCOPE: CPT | Mod: 59 | Performed by: EMERGENCY MEDICINE

## 2023-05-05 PROCEDURE — 83605 ASSAY OF LACTIC ACID: CPT | Performed by: EMERGENCY MEDICINE

## 2023-05-05 PROCEDURE — 93010 ELECTROCARDIOGRAM REPORT: CPT | Mod: ,,, | Performed by: INTERNAL MEDICINE

## 2023-05-05 PROCEDURE — 83735 ASSAY OF MAGNESIUM: CPT | Performed by: EMERGENCY MEDICINE

## 2023-05-05 PROCEDURE — 82077 ASSAY SPEC XCP UR&BREATH IA: CPT | Performed by: EMERGENCY MEDICINE

## 2023-05-05 PROCEDURE — 25000003 PHARM REV CODE 250: Performed by: EMERGENCY MEDICINE

## 2023-05-05 PROCEDURE — 99285 EMERGENCY DEPT VISIT HI MDM: CPT | Mod: 25

## 2023-05-05 PROCEDURE — 85025 COMPLETE CBC W/AUTO DIFF WBC: CPT | Performed by: EMERGENCY MEDICINE

## 2023-05-05 PROCEDURE — 80178 ASSAY OF LITHIUM: CPT

## 2023-05-05 PROCEDURE — 84484 ASSAY OF TROPONIN QUANT: CPT | Performed by: EMERGENCY MEDICINE

## 2023-05-05 PROCEDURE — 85730 THROMBOPLASTIN TIME PARTIAL: CPT | Performed by: EMERGENCY MEDICINE

## 2023-05-05 PROCEDURE — 82140 ASSAY OF AMMONIA: CPT | Performed by: EMERGENCY MEDICINE

## 2023-05-05 PROCEDURE — 83605 ASSAY OF LACTIC ACID: CPT | Mod: 91

## 2023-05-05 PROCEDURE — 87040 BLOOD CULTURE FOR BACTERIA: CPT | Performed by: EMERGENCY MEDICINE

## 2023-05-05 RX ORDER — QUETIAPINE FUMARATE 100 MG/1
100 TABLET, FILM COATED ORAL NIGHTLY
Status: ON HOLD | COMMUNITY
Start: 2023-04-10 | End: 2023-05-28 | Stop reason: HOSPADM

## 2023-05-05 RX ORDER — FUROSEMIDE 20 MG/1
10 TABLET ORAL 2 TIMES DAILY
Status: ON HOLD | COMMUNITY
End: 2023-05-28 | Stop reason: HOSPADM

## 2023-05-05 RX ORDER — HYDROXYZINE HYDROCHLORIDE 50 MG/1
50 TABLET, FILM COATED ORAL NIGHTLY
COMMUNITY
Start: 2023-04-14 | End: 2023-05-05 | Stop reason: CLARIF

## 2023-05-05 RX ORDER — OLANZAPINE 2.5 MG/1
5 TABLET ORAL NIGHTLY
Status: DISCONTINUED | OUTPATIENT
Start: 2023-05-05 | End: 2023-05-07 | Stop reason: HOSPADM

## 2023-05-05 RX ORDER — LITHIUM CARBONATE 300 MG
300 TABLET ORAL DAILY
Status: ON HOLD | COMMUNITY
Start: 2023-04-10 | End: 2023-05-28 | Stop reason: HOSPADM

## 2023-05-05 RX ORDER — LACTULOSE 10 G/15ML
45 SOLUTION ORAL 2 TIMES DAILY
Status: ON HOLD | COMMUNITY
Start: 2023-04-17 | End: 2023-05-28 | Stop reason: HOSPADM

## 2023-05-05 RX ORDER — OLANZAPINE 5 MG/1
5 TABLET ORAL NIGHTLY
Status: ON HOLD | COMMUNITY
Start: 2023-04-26 | End: 2023-05-28 | Stop reason: HOSPADM

## 2023-05-05 RX ORDER — GLUCAGON 1 MG
1 KIT INJECTION
Status: DISCONTINUED | OUTPATIENT
Start: 2023-05-05 | End: 2023-05-07 | Stop reason: HOSPADM

## 2023-05-05 RX ORDER — DEXTROSE 40 %
30 GEL (GRAM) ORAL
Status: DISCONTINUED | OUTPATIENT
Start: 2023-05-05 | End: 2023-05-07 | Stop reason: HOSPADM

## 2023-05-05 RX ORDER — ZONISAMIDE 100 MG/1
100 CAPSULE ORAL DAILY
Status: DISCONTINUED | OUTPATIENT
Start: 2023-05-05 | End: 2023-05-07 | Stop reason: HOSPADM

## 2023-05-05 RX ORDER — LEVETIRACETAM 500 MG/1
1000 TABLET ORAL 2 TIMES DAILY
Status: DISCONTINUED | OUTPATIENT
Start: 2023-05-05 | End: 2023-05-07 | Stop reason: HOSPADM

## 2023-05-05 RX ORDER — FOLIC ACID 1 MG/1
1 TABLET ORAL DAILY
Status: DISCONTINUED | OUTPATIENT
Start: 2023-05-06 | End: 2023-05-07 | Stop reason: HOSPADM

## 2023-05-05 RX ORDER — LITHIUM CARBONATE 150 MG/1
300 CAPSULE ORAL DAILY
Status: DISCONTINUED | OUTPATIENT
Start: 2023-05-06 | End: 2023-05-07 | Stop reason: HOSPADM

## 2023-05-05 RX ORDER — LACTULOSE 10 G/15ML
20 SOLUTION ORAL
Status: COMPLETED | OUTPATIENT
Start: 2023-05-05 | End: 2023-05-05

## 2023-05-05 RX ORDER — SODIUM CHLORIDE 0.9 % (FLUSH) 0.9 %
10 SYRINGE (ML) INJECTION EVERY 12 HOURS PRN
Status: DISCONTINUED | OUTPATIENT
Start: 2023-05-05 | End: 2023-05-07 | Stop reason: HOSPADM

## 2023-05-05 RX ORDER — SPIRONOLACTONE 25 MG/1
25 TABLET ORAL DAILY
Status: DISCONTINUED | OUTPATIENT
Start: 2023-05-06 | End: 2023-05-07 | Stop reason: HOSPADM

## 2023-05-05 RX ORDER — QUETIAPINE FUMARATE 100 MG/1
100 TABLET, FILM COATED ORAL NIGHTLY
Status: DISCONTINUED | OUTPATIENT
Start: 2023-05-05 | End: 2023-05-07 | Stop reason: HOSPADM

## 2023-05-05 RX ORDER — DEXTROSE 40 %
15 GEL (GRAM) ORAL
Status: DISCONTINUED | OUTPATIENT
Start: 2023-05-05 | End: 2023-05-07 | Stop reason: HOSPADM

## 2023-05-05 RX ORDER — SODIUM BICARBONATE 650 MG/1
1300 TABLET ORAL 2 TIMES DAILY
Status: DISCONTINUED | OUTPATIENT
Start: 2023-05-05 | End: 2023-05-07 | Stop reason: HOSPADM

## 2023-05-05 RX ORDER — LACTULOSE 10 G/15ML
30 SOLUTION ORAL 3 TIMES DAILY
Status: DISCONTINUED | OUTPATIENT
Start: 2023-05-05 | End: 2023-05-07 | Stop reason: HOSPADM

## 2023-05-05 RX ORDER — PROPRANOLOL HYDROCHLORIDE 10 MG/1
20 TABLET ORAL DAILY
Status: DISCONTINUED | OUTPATIENT
Start: 2023-05-06 | End: 2023-05-07 | Stop reason: HOSPADM

## 2023-05-05 RX ORDER — THIAMINE HCL 100 MG
100 TABLET ORAL DAILY
Status: DISCONTINUED | OUTPATIENT
Start: 2023-05-06 | End: 2023-05-07 | Stop reason: HOSPADM

## 2023-05-05 RX ADMIN — QUETIAPINE FUMARATE 100 MG: 100 TABLET ORAL at 09:05

## 2023-05-05 RX ADMIN — LEVETIRACETAM 1000 MG: 500 TABLET, FILM COATED ORAL at 09:05

## 2023-05-05 RX ADMIN — LACTULOSE 30 G: 20 SOLUTION ORAL at 10:05

## 2023-05-05 RX ADMIN — LACTULOSE 20 G: 20 SOLUTION ORAL at 02:05

## 2023-05-05 RX ADMIN — SODIUM BICARBONATE 1300 MG: 650 TABLET ORAL at 09:05

## 2023-05-05 RX ADMIN — RIFAXIMIN 550 MG: 550 TABLET ORAL at 09:05

## 2023-05-05 RX ADMIN — OLANZAPINE 5 MG: 2.5 TABLET, FILM COATED ORAL at 09:05

## 2023-05-05 NOTE — ED PROVIDER NOTES
Encounter Date: 5/5/2023    SCRIBE #1 NOTE: I, Cameron Villanueva, am scribing for, and in the presence of,  Coy Coreas MD. I have scribed the following portions of the note - Other sections scribed: HPI, ROS, PE, MDM.     History     Chief Complaint   Patient presents with    Altered Mental Status     AMS not compliant with medications for approximately a week. Pt presents with jaundice . Pts sitter states that she is having hallucinations. Pt also presents with ascites.      Marely Hamilton is a 58 y.o. female who has a past medical history of Addiction to drug, Alcohol abuse, Alcohol abuse, in remission (6/15/2015), Anxiety (6/15/2015), Behavioral problem, Bipolar disorder, Bipolar disorder in remission (6/15/2015), Depression, History of psychiatric care, History of psychiatric hospitalization, Jenaa, Other ascites (6/15/2015), Psychiatric exam requested by authority, Psychiatric problem, Psychosis (9/26/2019), Self-harming behavior, Suicide attempt.    Patient was noticed earlier today by her sitter with an altered mental status possibly having hallucinations.  She reportedly has a history of noncompliance with her medications.  Patient was admitted last month after she was found with an elevated ammonia level.  She presents to the ED slightly lethargic with no physical complaints.    The history is provided by the EMS personnel, a caregiver and the patient. No  was used.   Review of patient's allergies indicates:   Allergen Reactions    Sulfa (sulfonamide antibiotics) Rash    Codeine Nausea And Vomiting     Past Medical History:   Diagnosis Date    Addiction to drug     Alcohol abuse     Alcohol abuse, in remission 6/15/2015    14.5 weeks ago; AA weekly    Anemia     Anxiety 6/15/2015    Behavioral problem     Bipolar disorder     Bipolar disorder in remission 6/15/2015    Cirrhosis, Laennec's 6/15/2015    Depression     Encounter for blood transfusion     Epistaxis 6/15/2015    Fatigue      Glaucoma     Hematuria     Hepatic encephalopathy 6/15/2015    Hepatic enlargement     History of psychiatric care     History of psychiatric hospitalization     History of seizure 6/15/2015    1    hx of intentional Tylenol overdose 6/15/2015    2005- situational and hx of bipolar    Hx of psychiatric care     Macrocytic anemia 9/18/2015    6 units PRBC since June 2015    Jeana     Osteoarthritis     Other ascites 6/15/2015    Psychiatric exam requested by authority     Psychiatric problem     Psychosis 9/26/2019    Renal disorder     Seizures     Self-harming behavior     Suicide attempt     Therapy     Thrombocytopenia 6/15/2015     Past Surgical History:   Procedure Laterality Date    COSMETIC SURGERY      ESOPHAGOGASTRODUODENOSCOPY       Family History   Problem Relation Age of Onset    Alcohol abuse Father     Suicide Father     Bipolar disorder Father     Alcohol abuse Sister     Hypertension Mother     Alcohol abuse Paternal Grandfather     Drug abuse Neg Hx     Dementia Neg Hx      Social History     Tobacco Use    Smoking status: Never    Smokeless tobacco: Never   Substance Use Topics    Alcohol use: Not Currently     Comment: hx of ETOH abuse with cirrhosis of liver    Drug use: No     Review of Systems   Reason unable to perform ROS: Alter mental status.     Physical Exam     Initial Vitals [05/05/23 1105]   BP Pulse Resp Temp SpO2   117/61 (!) 59 18 97.9 °F (36.6 °C) 98 %      MAP       --         Physical Exam    Nursing note and vitals reviewed.  HENT:   Head: Atraumatic.   Dry mucous membranes.   Eyes: EOM are normal. Scleral icterus is present.   Neck: Neck supple.   Cardiovascular:  Normal rate, regular rhythm and normal heart sounds.           Pulmonary/Chest: Breath sounds normal. No respiratory distress.   Abdominal: Abdomen is soft. There is no abdominal tenderness.   Musculoskeletal:         General: No edema. Normal range of motion.      Cervical back: Neck supple.      Right lower leg: No  edema.      Left lower leg: No edema.     Neurological: She is alert and oriented to person, place, and time.   Skin: Skin is warm and dry. There is pallor (slighty).   Psychiatric: Thought content normal.       ED Course   Procedures  Labs Reviewed   CBC W/ AUTO DIFFERENTIAL - Abnormal; Notable for the following components:       Result Value    RBC 3.57 (*)     Hemoglobin 11.4 (*)     Hematocrit 35.9 (*)      (*)     MCH 31.9 (*)     MCHC 31.8 (*)     RDW 16.7 (*)     Platelets 88 (*)     All other components within normal limits   COMPREHENSIVE METABOLIC PANEL - Abnormal; Notable for the following components:    Potassium 3.4 (*)     Chloride 114 (*)     CO2 21 (*)     Glucose 138 (*)     Total Protein 5.3 (*)     Albumin 2.3 (*)     Total Bilirubin 2.6 (*)     AST 42 (*)     Anion Gap 4 (*)     All other components within normal limits   PROTIME-INR - Abnormal; Notable for the following components:    Prothrombin Time 17.7 (*)     INR 1.7 (*)     All other components within normal limits   URINALYSIS - Abnormal; Notable for the following components:    Urobilinogen, UA >=8.0 (*)     All other components within normal limits    Narrative:     Specimen Source->Urine   LACTIC ACID, PLASMA - Abnormal; Notable for the following components:    Lactate (Lactic Acid) 2.9 (*)     All other components within normal limits   AMMONIA - Abnormal; Notable for the following components:    Ammonia 67 (*)     All other components within normal limits   CULTURE, BLOOD   CULTURE, BLOOD   CK   TROPONIN I   APTT   TSH   MAGNESIUM   LIPASE   DRUG SCREEN PANEL, URINE EMERGENCY    Narrative:     Specimen Source->Urine   ALCOHOL,MEDICAL (ETHANOL)          Imaging Results              X-Ray Chest 1 View (Final result)  Result time 05/05/23 14:12:48      Final result by Mumtaz Penny MD (05/05/23 14:12:48)                   Impression:      No convincing evidence of acute cardiopulmonary disease.      Electronically signed  by: Mumtaz Prasadross  Date:    05/05/2023  Time:    14:12               Narrative:    EXAMINATION:  XR CHEST 1 VIEW    CLINICAL HISTORY:  Altered mental status;    TECHNIQUE:  Single frontal view of the chest was performed.    COMPARISON:  Chest radiograph performed 03/09/2023    FINDINGS:  Monitoring leads overlie the chest.  Cardiomediastinal contours grossly unchanged.  Chronic interstitial coarsening, relatively similar appearance when compared to 03/09/2023 radiograph.    No definite focal airspace consolidation.    No definite pneumothorax or large pleural effusion.    No acute findings in the visualized abdomen.  Round to ovoid foci of increased attenuation in the right upper quadrant the abdomen could relate to cholelithiasis.  Osseous and soft tissue structures appear without definite acute change.                                       Medications   lactulose 20 gram/30 mL solution Soln 20 g (20 g Oral Given 5/5/23 1441)     Medical Decision Making:   Initial Assessment:     The patient presents to the ED via EMS for evaluation of altered mental status, ongoing for 1 week.  Differential Diagnosis:   Differential Diagnosis includes, but is not limited to:  CVA/TIA, seizure, status epilepticus, post-ictal state, meningitis/encephalitis, sepsis, MI/ACS, arrhythmia, syncope, intracranial mass/hemorrhage, head trauma, anaphylaxis, substance abuse, alcohol intoxication/withdrawal, medication reaction, intentional medication overdose, neuroleptic malignant syndrome, serotonin syndrome, CO poisoning, hypoxia/hypercapnea, hepatic encephalopathy, metabolic disturbance, thyroid disease, hypoglycemia.   Clinical Tests:   Lab Tests: Ordered and Reviewed       <> Summary of Lab: CBC shows a hemoglobin of 11.4 hematocrit of 35.9.  CMP with a potassium of 3.4, glucose of 139.  Ammonia level is 67.  Lactic acid is 2.9.  Radiological Study: Reviewed and Ordered  Medical Tests: Reviewed and Ordered  ED Management:  Ammonia  level is 67, suggestive of hepatic encephalopathy.  Patient is given a dose of lactulose.  She will be admitted by [facility] for further evaluation and treatment.        Scribe Attestation:   Scribe #1: I performed the above scribed service and the documentation accurately describes the services I performed. I attest to the accuracy of the note.            I, Dr. Coy Coreas, personally performed the services described in this documentation. All medical record entries made by the scribe were at my direction and in my presence. I have reviewed the chart and agree that the record reflects my personal performance and is accurate and complete. Coy Coreas MD.  2:59 PM 05/05/2023         Clinical Impression:   Final diagnoses:  [R41.82] Altered mental status  [K76.82] Hepatic encephalopathy (Primary)        ED Disposition Condition    Observation Stable                Coy Coreas MD  05/05/23 1500

## 2023-05-05 NOTE — NURSING
Pt up to floor; VS taken and recorded. Pt oriented to room, call light and personal items within reach. Bed alarm on and activated. Advised pt to call for assistance, pt verbalized understanding.    Pt given dinner tray and ice water.

## 2023-05-05 NOTE — PHARMACY MED REC
"  [facility]           Pharmacy  Admission Medication History     The home medication history was taken by Jovita Rangel.      Medication history obtained from Medications listed below were obtained from: Patient/family    Based on information gathered for medication list, you may go to "Admission" then "Reconcile Home Medications" tabs to review and/or act upon those items.     The home medication list has been updated by the Pharmacy department.   Please read ALL comments highlighted in yellow.   Please address this information as you see fit.    Feel free to contact us if you have any questions or require assistance.    The medications listed below were removed from the home medication list.  Please reorder if appropriate:    Patient reports NOT TAKING the following medication(s):  Lasix 20mg  Protonix 40mg          No current facility-administered medications on file prior to encounter.     Current Outpatient Medications on File Prior to Encounter   Medication Sig Dispense Refill    CONSTULOSE 10 gram/15 mL solution Take 45 mLs by mouth 2 (two) times daily.      folic acid (FOLVITE) 1 MG tablet Take 1 tablet (1 mg total) by mouth once daily. 30 tablet 2    levETIRAcetam (KEPPRA) 500 MG Tab Take 2 tablets (1,000 mg total) by mouth 2 (two) times daily. 60 tablet 3    lithium (LITHOTAB) 300 mg tablet Take 300 mg by mouth once daily.      multivitamin with iron (HAIR VITAMINS ORAL) Take by mouth.      OLANZapine (ZYPREXA) 5 MG tablet Take 5 mg by mouth every evening.      propranoloL (INDERAL) 20 MG tablet Take 20 mg by mouth once daily.      QUEtiapine (SEROQUEL) 100 MG Tab Take 100 mg by mouth every evening.      spironolactone (ALDACTONE) 25 MG tablet Take 25 mg by mouth once daily.      thiamine 100 MG tablet Take 1 tablet (100 mg total) by mouth once daily. 30 tablet 2    ARIPiprazole (ABILIFY) 10 MG Tab Take 10 mg by mouth nightly.      hydrOXYzine (ATARAX) 50 MG tablet Take 50 mg by " mouth every evening.      rifAXIMin (XIFAXAN) 550 mg Tab Take 1 tablet (550 mg total) by mouth 2 (two) times daily. 60 tablet 2    sodium bicarbonate 650 MG tablet Take 2 tablets (1,300 mg total) by mouth 2 (two) times daily. 120 tablet 11    zonisamide (ZONEGRAN) 100 MG Cap Take 100 mg by mouth once daily.         Please address this information as you see fit.  Feel free to contact us if you have any questions or require assistance.    Jovita Rangel  [phone]                .

## 2023-05-05 NOTE — ED NOTES
Mauro from the lab calls and states that the 2nd ammonia level is also hemolyzed. Pt is now going to be marked as a lab collect.

## 2023-05-06 LAB
ALBUMIN SERPL BCP-MCNC: 2.1 G/DL (ref 3.5–5.2)
ALP SERPL-CCNC: 112 U/L (ref 55–135)
ALT SERPL W/O P-5'-P-CCNC: 22 U/L (ref 10–44)
ANION GAP SERPL CALC-SCNC: 5 MMOL/L (ref 8–16)
AST SERPL-CCNC: 31 U/L (ref 10–40)
BASOPHILS # BLD AUTO: 0.02 K/UL (ref 0–0.2)
BASOPHILS NFR BLD: 0.4 % (ref 0–1.9)
BILIRUB SERPL-MCNC: 2.9 MG/DL (ref 0.1–1)
BUN SERPL-MCNC: 11 MG/DL (ref 6–20)
CALCIUM SERPL-MCNC: 8.7 MG/DL (ref 8.7–10.5)
CHLORIDE SERPL-SCNC: 111 MMOL/L (ref 95–110)
CO2 SERPL-SCNC: 21 MMOL/L (ref 23–29)
CREAT SERPL-MCNC: 0.6 MG/DL (ref 0.5–1.4)
DIFFERENTIAL METHOD: ABNORMAL
EOSINOPHIL # BLD AUTO: 0.2 K/UL (ref 0–0.5)
EOSINOPHIL NFR BLD: 5 % (ref 0–8)
ERYTHROCYTE [DISTWIDTH] IN BLOOD BY AUTOMATED COUNT: 16.5 % (ref 11.5–14.5)
EST. GFR  (NO RACE VARIABLE): >60 ML/MIN/1.73 M^2
GLUCOSE SERPL-MCNC: 91 MG/DL (ref 70–110)
HCT VFR BLD AUTO: 31.4 % (ref 37–48.5)
HGB BLD-MCNC: 10.5 G/DL (ref 12–16)
IMM GRANULOCYTES # BLD AUTO: 0.01 K/UL (ref 0–0.04)
IMM GRANULOCYTES NFR BLD AUTO: 0.2 % (ref 0–0.5)
LYMPHOCYTES # BLD AUTO: 1.2 K/UL (ref 1–4.8)
LYMPHOCYTES NFR BLD: 26.7 % (ref 18–48)
MAGNESIUM SERPL-MCNC: 1.7 MG/DL (ref 1.6–2.6)
MCH RBC QN AUTO: 32.4 PG (ref 27–31)
MCHC RBC AUTO-ENTMCNC: 33.4 G/DL (ref 32–36)
MCV RBC AUTO: 97 FL (ref 82–98)
MONOCYTES # BLD AUTO: 0.5 K/UL (ref 0.3–1)
MONOCYTES NFR BLD: 10.4 % (ref 4–15)
NEUTROPHILS # BLD AUTO: 2.6 K/UL (ref 1.8–7.7)
NEUTROPHILS NFR BLD: 57.3 % (ref 38–73)
NRBC BLD-RTO: 0 /100 WBC
PHOSPHATE SERPL-MCNC: 3.1 MG/DL (ref 2.7–4.5)
PLATELET # BLD AUTO: 80 K/UL (ref 150–450)
PMV BLD AUTO: 9.4 FL (ref 9.2–12.9)
POTASSIUM SERPL-SCNC: 3.1 MMOL/L (ref 3.5–5.1)
PROT SERPL-MCNC: 4.9 G/DL (ref 6–8.4)
RBC # BLD AUTO: 3.24 M/UL (ref 4–5.4)
SODIUM SERPL-SCNC: 137 MMOL/L (ref 136–145)
WBC # BLD AUTO: 4.6 K/UL (ref 3.9–12.7)

## 2023-05-06 PROCEDURE — 85025 COMPLETE CBC W/AUTO DIFF WBC: CPT

## 2023-05-06 PROCEDURE — 25000003 PHARM REV CODE 250

## 2023-05-06 PROCEDURE — 36415 COLL VENOUS BLD VENIPUNCTURE: CPT

## 2023-05-06 PROCEDURE — 84100 ASSAY OF PHOSPHORUS: CPT

## 2023-05-06 PROCEDURE — 25000003 PHARM REV CODE 250: Performed by: STUDENT IN AN ORGANIZED HEALTH CARE EDUCATION/TRAINING PROGRAM

## 2023-05-06 PROCEDURE — 80053 COMPREHEN METABOLIC PANEL: CPT

## 2023-05-06 PROCEDURE — 83735 ASSAY OF MAGNESIUM: CPT

## 2023-05-06 PROCEDURE — G0378 HOSPITAL OBSERVATION PER HR: HCPCS

## 2023-05-06 RX ORDER — POTASSIUM CHLORIDE 20 MEQ/1
40 TABLET, EXTENDED RELEASE ORAL
Status: COMPLETED | OUTPATIENT
Start: 2023-05-06 | End: 2023-05-06

## 2023-05-06 RX ADMIN — SODIUM BICARBONATE 1300 MG: 650 TABLET ORAL at 09:05

## 2023-05-06 RX ADMIN — LACTULOSE 30 G: 20 SOLUTION ORAL at 09:05

## 2023-05-06 RX ADMIN — FOLIC ACID 1 MG: 1 TABLET ORAL at 09:05

## 2023-05-06 RX ADMIN — OLANZAPINE 5 MG: 2.5 TABLET, FILM COATED ORAL at 09:05

## 2023-05-06 RX ADMIN — LITHIUM CARBONATE 300 MG: 150 CAPSULE, GELATIN COATED ORAL at 09:05

## 2023-05-06 RX ADMIN — RIFAXIMIN 550 MG: 550 TABLET ORAL at 09:05

## 2023-05-06 RX ADMIN — PROPRANOLOL HYDROCHLORIDE 20 MG: 10 TABLET ORAL at 09:05

## 2023-05-06 RX ADMIN — SPIRONOLACTONE 25 MG: 25 TABLET, FILM COATED ORAL at 09:05

## 2023-05-06 RX ADMIN — THERA TABS 1 TABLET: TAB at 09:05

## 2023-05-06 RX ADMIN — QUETIAPINE FUMARATE 100 MG: 100 TABLET ORAL at 09:05

## 2023-05-06 RX ADMIN — LEVETIRACETAM 1000 MG: 500 TABLET, FILM COATED ORAL at 09:05

## 2023-05-06 RX ADMIN — RIFAXIMIN 550 MG: 550 TABLET ORAL at 10:05

## 2023-05-06 RX ADMIN — POTASSIUM CHLORIDE 40 MEQ: 1500 TABLET, EXTENDED RELEASE ORAL at 11:05

## 2023-05-06 RX ADMIN — POTASSIUM CHLORIDE 40 MEQ: 1500 TABLET, EXTENDED RELEASE ORAL at 01:05

## 2023-05-06 RX ADMIN — ZONISAMIDE 100 MG: 100 CAPSULE ORAL at 09:05

## 2023-05-06 RX ADMIN — THIAMINE HCL TAB 100 MG 100 MG: 100 TAB at 09:05

## 2023-05-06 NOTE — PLAN OF CARE
VSS, patient tolerating treatment well, able to ambulate with minimal assistance to bathroom, on RA, eating well. No signs of distress noted, bed is in the lowest position, wheels are locked and call bell within reach.

## 2023-05-06 NOTE — H&P
[facility] Medicine H&P Note     Admitting Team: [facility] Hospitalist Team A  Attending Physician: Miguel Miller MD  Resident:   Intern: Lennie    Date of Admit: 5/5/2023    Chief Complaint     Confusion x 1 day    Subjective:      History of Present Illness:  Marely Hamilton is a 58 y.o. female with a PMH of compensated alcoholic cirrhosis, seizure disorder on AED, and bipolar disorder who presents for confusion/hallucinations for 1 day.     The patient was in their usual state of health until today when her sitter and mother noticed a change in her mental status. They noted that the patient seemed confused and possibly hallucinating this morning, which is very different from her baseline. Per her sister, that patient is very sharp at baseline. She can drive, pay her bills, handle her medications. Her mother states that she doesn't always take her lactulose and often becomes confused like this. The patient reports feeling fine today and has no complaints. Denies fevers, chills, nausea, vomiting, CP, SOB, palpitations, abdominal pain, dysuria, increased urinary frequency, foul-smelling urine, changes in BM, rash. She reports taking her lactulose as prescribed and having 2-3 BM a day.     Past Medical History:  Compensated Alcoholic Cirrhosis  Bipolar Disorder  Seizure disorder  GERD    Past Surgical History:  Past Surgical History:   Procedure Laterality Date    COSMETIC SURGERY      ESOPHAGOGASTRODUODENOSCOPY       Allergies:  Review of patient's allergies indicates:   Allergen Reactions    Sulfa (sulfonamide antibiotics) Rash    Codeine Nausea And Vomiting     Home Medications:  Seroquel 100mg nightly  Lithium 300mg daily  Zyprexa 5mg qHs  Keppra 1000mg BID  Zonisamide 100mg daily  Propanolol 20mg daily  Lasix 10mg BID  Spironolactone 25mg daily  Lactulose 30g TID  Rifaximin 550mg BID - not on this anymore because she could not afford it  Protonix 40mg daily  Sodium bicarb 1300mg BID  Folate  Thiamine    Family  "History:  Family History   Problem Relation Age of Onset    Alcohol abuse Father     Suicide Father     Bipolar disorder Father     Alcohol abuse Sister     Hypertension Mother     Alcohol abuse Paternal Grandfather     Drug abuse Neg Hx     Dementia Neg Hx      Social History:  Insignificant smoking history (1 pack per year)  Used to drink EtOH to excess, liquor  Denies illicits  Has a sitter at home but otherwise independent  States she used to work as  years ago    Review of Systems:  As above in HPI. All other systems are reviewed and are negative.    Health Maintaince :   Primary Care Physician: Viktor Ross MD    Immunizations:   TDap NUTD    Flu NUTD   Pna NUTD  COVID x2    Cancer Screening:  PAP: NUTD  MMG: NUTD  Colonoscopy: NUTD     Objective:   Last 24 Hour Vital Signs:  BP  Min: 101/57  Max: 124/64  Temp  Av.3 °F (36.8 °C)  Min: 97.9 °F (36.6 °C)  Max: 98.7 °F (37.1 °C)  Pulse  Av.6  Min: 53  Max: 62  Resp  Av.9  Min: 11  Max: 20  SpO2  Av.6 %  Min: 96 %  Max: 99 %  Height  Av' 2" (157.5 cm)  Min: 5' 2" (157.5 cm)  Max: 5' 2" (157.5 cm)  Weight  Av.3 kg (99 lb 13.9 oz)  Min: 45.3 kg (99 lb 13.9 oz)  Max: 45.3 kg (99 lb 13.9 oz)  Body mass index is 18.27 kg/m².  No intake/output data recorded.    Physical Examination:  General: alert and oriented, conversational, NAD  HEENT: PERRL, EOMI, moist mucus membranes   Neck: Trachea midline, JVP ~6cm  Cardiovascular: RRR, no murmurs appreciated  Pulm: CTAB, no wheezes or crackles; no respiratory distress  Abdomen: Soft, non-tender, non-distended; no ascites on bedside ultrasound  Skin: No rashes or erythema noted   Extremities: Atraumatic, no edema  Pulses: 2+ and symmetric  Neurological: no focal deficits  Psychiatric: flat affect, became a little tangential at the end of interview    Laboratory:  Most Recent Data:  CBC:   Lab Results   Component Value Date    WBC 4.82 2023    HGB 11.4 (L) 2023    HCT 35.9 (L) " 05/05/2023    PLT 88 (L) 05/05/2023     (H) 05/05/2023    RDW 16.7 (H) 05/05/2023     BMP:   Lab Results   Component Value Date     05/05/2023    K 3.4 (L) 05/05/2023     (H) 05/05/2023    CO2 21 (L) 05/05/2023    BUN 14 05/05/2023    CREATININE 0.6 05/05/2023     (H) 05/05/2023    CALCIUM 8.8 05/05/2023    MG 1.8 05/05/2023    PHOS 3.0 03/12/2023     LFTs:   Lab Results   Component Value Date    PROT 5.3 (L) 05/05/2023    ALBUMIN 2.3 (L) 05/05/2023    BILITOT 2.6 (H) 05/05/2023    AST 42 (H) 05/05/2023    ALKPHOS 114 05/05/2023    ALT 22 05/05/2023     (H) 06/02/2020     Coags:   Lab Results   Component Value Date    INR 1.7 (H) 05/05/2023     FLP:   Lab Results   Component Value Date    CHOL 138 12/11/2020    HDL 48 12/11/2020    LDLCALC 82.2 12/11/2020    TRIG 39 12/11/2020    CHOLHDL 34.8 12/11/2020     DM:   Lab Results   Component Value Date    HGBA1C 4.1 01/22/2021    HGBA1C 4.6 12/10/2020    HGBA1C 4.1 05/16/2020    LDLCALC 82.2 12/11/2020    CREATININE 0.6 05/05/2023     Thyroid:   Lab Results   Component Value Date    TSH 0.721 05/05/2023    FREET4 0.81 02/07/2023     Anemia:   Lab Results   Component Value Date    IRON 208 (H) 10/23/2015    TIBC 244 (L) 10/23/2015    FERRITIN 412 (H) 10/23/2015    WOLWIHSM50 944 02/10/2023    FOLATE 15.1 02/10/2023     Cardiac:   Lab Results   Component Value Date    TROPONINI <0.006 05/05/2023     Urinalysis:   Lab Results   Component Value Date    COLORU Yellow 05/05/2023    SPECGRAV 1.020 05/05/2023    NITRITE Negative 05/05/2023    KETONESU Negative 05/05/2023    UROBILINOGEN >=8.0 (A) 05/05/2023    WBCUA 29 (H) 02/16/2023     Microbiology Data:  Blood Culture 5/5 pending    Other Results:  EKG (my interpretation): Sinus bradycardia, TWI in V1-V3 seen on previous EKGs    Radiology:  CXR 5/5:  Monitoring leads overlie the chest.  Cardiomediastinal contours grossly unchanged.  Chronic interstitial coarsening, relatively similar  appearance when compared to 03/09/2023 radiograph.  No definite focal airspace consolidation.  No definite pneumothorax or large pleural effusion.  No acute findings in the visualized abdomen.  Round to ovoid foci of increased attenuation in the right upper quadrant the abdomen could relate to cholelithiasis.  Osseous and soft tissue structures appear without definite acute change.    CTH 5/5:  Chronic microvascular ischemic changes that are stable. No acute process.      Assessment:     Marely Hamilton is a 58 y.o. female with a PMH of compensated alcoholic cirrhosis, seizure disorder on AED, and bipolar disorder who presents for confusion/hallucinations for 1 day noted by her sitter and mother. Patient does not always take her lactulose as prescribed per family. Patient was admitted for acute encephalopathy likely hepatic encephalopathy 2/2 not taking lactulose.      Plan:     Acute encephalopathy  Hepatic encephalopathy  Compensated alcoholic cirrhosis   - patient presenting with 1 day of confusion  - family reports patient often does not take her lactulose as prescribed and becomes confused like this  - afebrile, no leukocytosis, TSH wnl, UDS negative, UA negative for infection, EtOH level negative  - no abdominal pain, abdomen soft and nontender, bedside US with no ascites  - on RA, CXR with no acute process  - CTH negative for acute process  - INR 1.7, MELD 17  - home meds: propanolol 20mg daily, lasix 10mg BID, spironolactone 25mg daily, lactulose 30g TID  - continue home meds  - resumed lactulose for ~3 BM/day  - ordered lithium level    Seizure disorder  - family denies witnessed seizure or post-ictal state  - no seizures witnessed in ED  - continue home keppra 1000mg bid, zyprexa 5mg qHS and zonisamide 100mg daily  - little suspicion for seizure at this time, but can consider EEG if patient persistently confused     Bipolar 1 disorder  - continue home seroquel 100mg nightly and lithium 300mg daily  -  ordered lithium level    Chronic non-anion gap metabolic acidosis  - HCO3 21, AG 4, Cl 114  - continue home sodium bicarb 1300mg BID    Macrocytic anemia  - Hb 11.4,   - baseline Hb fluctuates, anywhere in range of 9-13  - B12 792 and folate 13.5 in 04/2023  - no acute issues    Thrombocytopenia  - likely 2/2 cirrhosis  - PT 17.7, INR 1.7  - Plt 88 within baseline range  - no acute issues    Healthcare Maintenance  - needs Tdap and PNA  - needs PAP, MMG, and CSC    Diet: regular  DVT: SCDs  IVF: None  Code: Full    Dispo: Admit to observation for hepatic encephalopathy    Sharron DERAS Do, MD  [facility] HO-II  [facility] Internal Medicine Team A    [facility] Medicine Hospitalist Pager numbers:   [facility] Hospitalist Medicine Team A (Angela/Vianney): [phone]  [facility] Hospitalist Medicine Team B (Lyn/Edenilson):  [phone]

## 2023-05-06 NOTE — PROGRESS NOTES
"\[facility]\"" Hospital Medicine Progress Note    Primary Team: \[facility]\"" Hospitalist Team A  Attending Physician: Miguel Miller MD  Resident:   Intern: Lennie    Subjective:      Patient reports feeling better today. Denies fevers, chills, nausea, vomiting, CP, SOB. States that she feels back to her baseline. Had 2 BM yesterday.      Objective:     Last 24 Hour Vital Signs:  BP  Min: 101/57  Max: 125/68  Temp  Av.6 °F (37 °C)  Min: 97.9 °F (36.6 °C)  Max: 99.2 °F (37.3 °C)  Pulse  Av.6  Min: 53  Max: 87  Resp  Avg: 15.3  Min: 11  Max: 20  SpO2  Av.4 %  Min: 95 %  Max: 99 %  Height  Av' 2" (157.5 cm)  Min: 5' 2" (157.5 cm)  Max: 5' 2" (157.5 cm)  Weight  Av.3 kg (99 lb 13.9 oz)  Min: 45.3 kg (99 lb 13.9 oz)  Max: 45.3 kg (99 lb 13.9 oz)  No intake/output data recorded.    Physical Examination:  General: alert and oriented, conversational, NAD  HEENT: PERRL, EOMI, moist mucus membranes   Neck: Trachea midline, JVP ~6cm  Cardiovascular: RRR, no murmurs appreciated  Pulm: CTAB, no wheezes or crackles; no respiratory distress  Abdomen: Soft, non-tender, non-distended; no ascites on bedside ultrasound  Skin: No rashes or erythema noted   Extremities: Atraumatic, no edema  Pulses: 2+ and symmetric  Neurological: no focal deficits  Psychiatric: flat affect more improved today    Laboratory:  Recent Labs   Lab 23  1203 23  0605   WBC 4.82 4.60   HGB 11.4* 10.5*   HCT 35.9* 31.4*   PLT 88* 80*   * 97   RDW 16.7* 16.5*     --    K 3.4*  --    *  --    CO2 21*  --    BUN 14  --    CREATININE 0.6  --    *  --    PROT 5.3*  --    ALBUMIN 2.3*  --    BILITOT 2.6*  --    AST 42*  --    ALKPHOS 114  --    ALT 22  --      Laboratory Data Reviewed.    Microbiology Data Reviewed:  Blood Cx  NGTD    Other Results:  EKG (my interpretation): no new    Radiology Data Reviewed:   CXR :  Monitoring leads overlie the chest.  Cardiomediastinal contours grossly unchanged.  Chronic " interstitial coarsening, relatively similar appearance when compared to 03/09/2023 radiograph.  No definite focal airspace consolidation.  No definite pneumothorax or large pleural effusion.  No acute findings in the visualized abdomen.  Round to ovoid foci of increased attenuation in the right upper quadrant the abdomen could relate to cholelithiasis.  Osseous and soft tissue structures appear without definite acute change.     CTH 5/5:  Chronic microvascular ischemic changes that are stable. No acute process.     Current Medications:     Infusions:       Scheduled:   folic acid  1 mg Oral Daily    lactulose  30 g Oral TID    levETIRAcetam  1,000 mg Oral BID    lithium  300 mg Oral Daily    multivitamin  1 tablet Oral Daily    OLANZapine  5 mg Oral QHS    propranoloL  20 mg Oral Daily    QUEtiapine  100 mg Oral QHS    rifAXIMin  550 mg Oral BID    sodium bicarbonate  1,300 mg Oral BID    spironolactone  25 mg Oral Daily    thiamine  100 mg Oral Daily    zonisamide  100 mg Oral Daily        PRN:  dextrose 10%, dextrose 10%, dextrose, dextrose, glucagon (human recombinant), sodium chloride 0.9%    Assessment:     Marely Hamilton is a 58 y.o. female with a PMH of compensated alcoholic cirrhosis, seizure disorder on AED, and bipolar disorder who presents for confusion/hallucinations for 1 day noted by her sitter and mother. Patient does not always take her lactulose as prescribed per family. Patient was admitted for acute encephalopathy likely hepatic encephalopathy 2/2 not taking lactulose.      Plan:     Acute encephalopathy, resolved  Hepatic encephalopathy  Compensated alcoholic cirrhosis   - patient presenting with 1 day of confusion  - family reports patient often does not take her lactulose as prescribed and becomes confused like this  - afebrile, no leukocytosis, TSH wnl, UDS negative, UA negative for infection, EtOH level negative  - lithium level is low so no concern for lithium toxicity  - no abdominal  pain, abdomen soft and nontender, bedside US with no ascites  - on RA, CXR with no acute process  - CTH negative for acute process  - INR 1.7, MELD 17  - home meds: propanolol 20mg daily, lasix 10mg BID, spironolactone 25mg daily, lactulose 30g TID  - continue home meds  - mental status improved today  - resume lactulose for ~3 BM/day     Seizure disorder  - family denies witnessed seizure or post-ictal state  - no seizures witnessed in ED  - continue home keppra 1000mg bid, zyprexa 5mg qHS and zonisamide 100mg daily  - patient appears at her baseline mental status, no need for EEG     Bipolar 1 disorder  - continue home seroquel 100mg nightly and lithium 300mg daily  - lithium level 0.4 (subtherapeutic)     Chronic non-anion gap metabolic acidosis  - HCO3 21, AG 4, Cl 114  - continue home sodium bicarb 1300mg BID     Macrocytic anemia  - Hb 11.4,   - baseline Hb fluctuates, anywhere in range of 9-13  - B12 792 and folate 13.5 in 04/2023  - no acute issues     Thrombocytopenia  - likely 2/2 cirrhosis  - PT 17.7, INR 1.7  - Plt 88 within baseline range  - no acute issues     Healthcare Maintenance  - needs Tdap and PNA  - needs PAP, MMG, and CSC     Diet: regular  DVT: SCDs  IVF: None  Code: Full     Dispo: likely discharge today     Sharron DERAS Do, MD  [facility] Internal Medicine HO-II  [facility] Internal Medicine Team A    \[facility]\"" Medicine Hospitalist Pager numbers:   [facility] Hospitalist Medicine Team A (Angela/Vianney): [phone]  [facility] Hospitalist Medicine Team B (Lyn/Edenilson):  [phone]

## 2023-05-06 NOTE — PLAN OF CARE
05/05/23 2108   Admission   Initial VN Admission Questions Complete   Communication Issues? None   Shift   Virtual Nurse - Rounding Complete   Virtual Nurse - Patient Verbalized Approval Of Camera Use;VN Rounding   Type of Frequent Check   Type Patient Rounds   Safety/Activity   Safety Promotion/Fall Prevention assistive device/personal item within reach;bed alarm set;Fall Risk reviewed with patient/family;side rails raised x 2;instructed to call staff for mobility   Admission questions completed. Introduced patient to VIP model, patient verbalized understanding. Educated patient on fall prevention protocol, updated on plan of care. Opportunity given for pt's questions. All questions answered. Denies needs at this time.

## 2023-05-06 NOTE — PLAN OF CARE
"[facility]      HOME HEALTH ORDERS  FACE TO FACE ENCOUNTER    Patient Name: Marely Hamilton  YOB: 1965    PCP: Viktor Ross MD   PCP Address: [address] / [place]  PCP Phone Number: [phone]  PCP Fax: [phone]    Encounter Date: 5/5/23    Admit to Home Health    Diagnoses:  Active Hospital Problems    Diagnosis  POA    *Hepatic encephalopathy [K76.82]  Yes    Metabolic acidosis [E87.20]  Yes    Bipolar 1 disorder [F31.9]  Yes    Chronic liver failure [K72.11]  Yes    Malnutrition [E46]  Yes    Macrocytic anemia [D53.9]  Unknown     6 units PRBC since June 2015        Cirrhosis, Laennec's [K70.30]  Yes    Thrombocytopenia [D69.6]  Yes    Alcohol abuse, in remission [F10.11]  Yes     Has addiction psych clearance from Dr Crain for liver tx        History of seizure [Z87.898]  Yes     One seizure 2013- "low blood sugar from a starvation diet"          Resolved Hospital Problems   No resolved problems to display.       Follow Up Appointments:  No future appointments.    Allergies:  Review of patient's allergies indicates:   Allergen Reactions    Sulfa (sulfonamide antibiotics) Rash    Codeine Nausea And Vomiting       Medications: Review discharge medications with patient and family and provide education.    Current Discharge Medication List        CONTINUE these medications which have NOT CHANGED    Details   CONSTULOSE 10 gram/15 mL solution Take 45 mLs by mouth 2 (two) times daily.      folic acid (FOLVITE) 1 MG tablet Take 1 tablet (1 mg total) by mouth once daily.  Qty: 30 tablet, Refills: 2      furosemide (LASIX) 20 MG tablet Take 10 mg by mouth 2 (two) times daily.      levETIRAcetam (KEPPRA) 500 MG Tab Take 2 tablets (1,000 mg total) by mouth 2 (two) times daily.  Qty: 60 tablet, Refills: 3      lithium (LITHOTAB) 300 mg tablet Take 300 mg by mouth once daily.      multivitamin with iron (HAIR VITAMINS ORAL) Take by mouth.      OLANZapine (ZYPREXA) 5 MG " tablet Take 5 mg by mouth every evening.      propranoloL (INDERAL) 20 MG tablet Take 20 mg by mouth once daily.      QUEtiapine (SEROQUEL) 100 MG Tab Take 100 mg by mouth every evening.      spironolactone (ALDACTONE) 25 MG tablet Take 25 mg by mouth once daily.      thiamine 100 MG tablet Take 1 tablet (100 mg total) by mouth once daily.  Qty: 30 tablet, Refills: 2      rifAXIMin (XIFAXAN) 550 mg Tab Take 1 tablet (550 mg total) by mouth 2 (two) times daily.  Qty: 60 tablet, Refills: 2      sodium bicarbonate 650 MG tablet Take 2 tablets (1,300 mg total) by mouth 2 (two) times daily.  Qty: 120 tablet, Refills: 11      zonisamide (ZONEGRAN) 100 MG Cap Take 100 mg by mouth once daily.               I have seen and examined this patient within the last 30 days. My clinical findings that support the need for the home health skilled services and home bound status are the following:no   Patient with medication mismanagement issues requiring home bound status as evidenced by  Poor adherence to medication regimen/dosage.  Mental confusion making it unsafe for patient to leave home alone due to  Bipolar Disorder and hepatic encephalopathy.     Diet:   regular diet    Labs:  Per agency    Referrals/ Consults  Physical Therapy to evaluate and treat. Evaluate for home safety and equipment needs; Establish/upgrade home exercise program. Perform / instruct on therapeutic exercises, gait training, transfer training, and Range of Motion.  Occupational Therapy to evaluate and treat. Evaluate home environment for safety and equipment needs. Perform/Instruct on transfers, ADL training, ROM, and therapeutic exercises.  Aide to provide assistance with personal care, ADLs, and vital signs.    Activities:   activity as tolerated    Nursing:   Agency to admit patient within 24 hours of hospital discharge unless specified on physician order or at patient request    SN to complete comprehensive assessment including routine vital signs.  Instruct on disease process and s/s of complications to report to MD. Review/verify medication list sent home with the patient at time of discharge  and instruct patient/caregiver as needed. Frequency may be adjusted depending on start of care date.     Skilled nurse to perform up to 3 visits PRN for symptoms related to diagnosis    Notify MD if SBP > 160 or < 90; DBP > 90 or < 50; HR > 120 or < 50; Temp > 101; O2 < 88%    Ok to schedule additional visits based on staff availability and patient request on consecutive days within the home health episode.    When multiple disciplines ordered:    Start of Care occurs on Sunday - Wednesday schedule remaining discipline evaluations as ordered on separate consecutive days following the start of care.    Thursday SOC -schedule subsequent evaluations Friday and Monday the following week.     Friday - Saturday SOC - schedule subsequent discipline evaluations on consecutive days starting Monday of the following week.    For all post-discharge communication and subsequent orders please contact patient's primary care physician. If unable to reach primary care physician or do not receive response within 30 minutes, please contact [facility] for clinical staff order clarification    Miscellaneous   None    Home Health Aide:  Nursing Twice weekly, Physical Therapy Twice weekly, Occupational Therapy Twice weekly, and Home Health Aide Twice weekly    Wound Care Orders  no    I certify that this patient is confined to her home and needs intermittent skilled nursing care, physical therapy, and occupational therapy.

## 2023-05-06 NOTE — PLAN OF CARE
Problem: Adult Inpatient Plan of Care  Goal: Plan of Care Review  Outcome: Ongoing, Progressing  Flowsheets (Taken 5/6/2023 0334)  Plan of Care Reviewed With: patient     Problem: Fall Injury Risk  Goal: Absence of Fall and Fall-Related Injury  Outcome: Ongoing, Progressing  Intervention: Promote Injury-Free Environment  Flowsheets (Taken 5/6/2023 0334)  Safety Promotion/Fall Prevention:   side rails raised x 3   lighting adjusted   bed alarm set     Problem: Social, Occupational or Functional Impairment (Psychotic Signs/Symptoms)  Goal: Enhanced Social, Occupational or Functional Skills (Psychotic Signs/Symptoms)  Outcome: Ongoing, Progressing  Intervention: Promote Social, Occupational and Functional Ability  Flowsheets (Taken 5/6/2023 0334)  Trust Relationship/Rapport:   empathic listening provided   emotional support provided     Problem: Thought Process Alteration  Goal: Optimal Thought Clarity  Outcome: Ongoing, Progressing  Intervention: Minimize Safety Risk and Altered Thought  Flowsheets (Taken 5/6/2023 0334)  Sensory Stimulation Regulation: tactile stimulation minimized  Enhanced Safety Measures: bed alarm set

## 2023-05-07 VITALS
BODY MASS INDEX: 18.38 KG/M2 | RESPIRATION RATE: 18 BRPM | TEMPERATURE: 99 F | SYSTOLIC BLOOD PRESSURE: 99 MMHG | WEIGHT: 99.88 LBS | OXYGEN SATURATION: 96 % | HEART RATE: 87 BPM | DIASTOLIC BLOOD PRESSURE: 53 MMHG | HEIGHT: 62 IN

## 2023-05-07 LAB
ALBUMIN SERPL BCP-MCNC: 2.2 G/DL (ref 3.5–5.2)
ALP SERPL-CCNC: 119 U/L (ref 55–135)
ALT SERPL W/O P-5'-P-CCNC: 21 U/L (ref 10–44)
ANION GAP SERPL CALC-SCNC: 2 MMOL/L (ref 8–16)
AST SERPL-CCNC: 31 U/L (ref 10–40)
BACTERIA #/AREA URNS HPF: ABNORMAL /HPF
BASOPHILS # BLD AUTO: 0.02 K/UL (ref 0–0.2)
BASOPHILS NFR BLD: 0.4 % (ref 0–1.9)
BILIRUB SERPL-MCNC: 2.4 MG/DL (ref 0.1–1)
BILIRUB UR QL STRIP: NEGATIVE
BUN SERPL-MCNC: 9 MG/DL (ref 6–20)
CALCIUM SERPL-MCNC: 8.6 MG/DL (ref 8.7–10.5)
CHLORIDE SERPL-SCNC: 113 MMOL/L (ref 95–110)
CLARITY UR: ABNORMAL
CO2 SERPL-SCNC: 22 MMOL/L (ref 23–29)
COLOR UR: YELLOW
CREAT SERPL-MCNC: 0.7 MG/DL (ref 0.5–1.4)
DIFFERENTIAL METHOD: ABNORMAL
EOSINOPHIL # BLD AUTO: 0.1 K/UL (ref 0–0.5)
EOSINOPHIL NFR BLD: 2.6 % (ref 0–8)
ERYTHROCYTE [DISTWIDTH] IN BLOOD BY AUTOMATED COUNT: 16.7 % (ref 11.5–14.5)
EST. GFR  (NO RACE VARIABLE): >60 ML/MIN/1.73 M^2
GLUCOSE SERPL-MCNC: 105 MG/DL (ref 70–110)
GLUCOSE UR QL STRIP: NEGATIVE
HCT VFR BLD AUTO: 32.3 % (ref 37–48.5)
HGB BLD-MCNC: 10.5 G/DL (ref 12–16)
HGB UR QL STRIP: NEGATIVE
IMM GRANULOCYTES # BLD AUTO: 0.03 K/UL (ref 0–0.04)
IMM GRANULOCYTES NFR BLD AUTO: 0.6 % (ref 0–0.5)
KETONES UR QL STRIP: NEGATIVE
LEUKOCYTE ESTERASE UR QL STRIP: ABNORMAL
LYMPHOCYTES # BLD AUTO: 1.2 K/UL (ref 1–4.8)
LYMPHOCYTES NFR BLD: 25.5 % (ref 18–48)
MAGNESIUM SERPL-MCNC: 1.8 MG/DL (ref 1.6–2.6)
MCH RBC QN AUTO: 31.6 PG (ref 27–31)
MCHC RBC AUTO-ENTMCNC: 32.5 G/DL (ref 32–36)
MCV RBC AUTO: 97 FL (ref 82–98)
MICROSCOPIC COMMENT: ABNORMAL
MONOCYTES # BLD AUTO: 0.5 K/UL (ref 0.3–1)
MONOCYTES NFR BLD: 10.3 % (ref 4–15)
NEUTROPHILS # BLD AUTO: 2.8 K/UL (ref 1.8–7.7)
NEUTROPHILS NFR BLD: 60.6 % (ref 38–73)
NITRITE UR QL STRIP: POSITIVE
NON-SQ EPI CELLS #/AREA URNS HPF: 2 /HPF
NRBC BLD-RTO: 0 /100 WBC
PH UR STRIP: >8 [PH] (ref 5–8)
PHOSPHATE SERPL-MCNC: 2.6 MG/DL (ref 2.7–4.5)
PLATELET # BLD AUTO: 83 K/UL (ref 150–450)
PMV BLD AUTO: 9.7 FL (ref 9.2–12.9)
POTASSIUM SERPL-SCNC: 4.5 MMOL/L (ref 3.5–5.1)
PROT SERPL-MCNC: 5 G/DL (ref 6–8.4)
PROT UR QL STRIP: ABNORMAL
RBC # BLD AUTO: 3.32 M/UL (ref 4–5.4)
RBC #/AREA URNS HPF: 4 /HPF (ref 0–4)
SODIUM SERPL-SCNC: 137 MMOL/L (ref 136–145)
SP GR UR STRIP: 1.01 (ref 1–1.03)
SQUAMOUS #/AREA URNS HPF: 2 /HPF
URN SPEC COLLECT METH UR: ABNORMAL
UROBILINOGEN UR STRIP-ACNC: >=8 EU/DL
WBC # BLD AUTO: 4.67 K/UL (ref 3.9–12.7)
WBC #/AREA URNS HPF: 15 /HPF (ref 0–5)

## 2023-05-07 PROCEDURE — 85025 COMPLETE CBC W/AUTO DIFF WBC: CPT

## 2023-05-07 PROCEDURE — 81000 URINALYSIS NONAUTO W/SCOPE: CPT

## 2023-05-07 PROCEDURE — 36415 COLL VENOUS BLD VENIPUNCTURE: CPT

## 2023-05-07 PROCEDURE — 87086 URINE CULTURE/COLONY COUNT: CPT

## 2023-05-07 PROCEDURE — 87186 SC STD MICRODIL/AGAR DIL: CPT

## 2023-05-07 PROCEDURE — G0378 HOSPITAL OBSERVATION PER HR: HCPCS

## 2023-05-07 PROCEDURE — 87088 URINE BACTERIA CULTURE: CPT

## 2023-05-07 PROCEDURE — 80053 COMPREHEN METABOLIC PANEL: CPT

## 2023-05-07 PROCEDURE — 84100 ASSAY OF PHOSPHORUS: CPT

## 2023-05-07 PROCEDURE — 25000003 PHARM REV CODE 250

## 2023-05-07 PROCEDURE — 87077 CULTURE AEROBIC IDENTIFY: CPT

## 2023-05-07 PROCEDURE — 83735 ASSAY OF MAGNESIUM: CPT

## 2023-05-07 RX ADMIN — PROPRANOLOL HYDROCHLORIDE 20 MG: 10 TABLET ORAL at 09:05

## 2023-05-07 RX ADMIN — THERA TABS 1 TABLET: TAB at 09:05

## 2023-05-07 RX ADMIN — ZONISAMIDE 100 MG: 100 CAPSULE ORAL at 09:05

## 2023-05-07 RX ADMIN — LACTULOSE 30 G: 20 SOLUTION ORAL at 03:05

## 2023-05-07 RX ADMIN — THIAMINE HCL TAB 100 MG 100 MG: 100 TAB at 09:05

## 2023-05-07 RX ADMIN — LEVETIRACETAM 1000 MG: 500 TABLET, FILM COATED ORAL at 09:05

## 2023-05-07 RX ADMIN — RIFAXIMIN 550 MG: 550 TABLET ORAL at 09:05

## 2023-05-07 RX ADMIN — FOLIC ACID 1 MG: 1 TABLET ORAL at 09:05

## 2023-05-07 RX ADMIN — SPIRONOLACTONE 25 MG: 25 TABLET, FILM COATED ORAL at 09:05

## 2023-05-07 RX ADMIN — SODIUM BICARBONATE 1300 MG: 650 TABLET ORAL at 09:05

## 2023-05-07 RX ADMIN — LACTULOSE 30 G: 20 SOLUTION ORAL at 09:05

## 2023-05-07 RX ADMIN — LITHIUM CARBONATE 300 MG: 150 CAPSULE, GELATIN COATED ORAL at 09:05

## 2023-05-07 NOTE — NURSING
[facility] at bedside for transport to home. All personal belongings with pt at time of discharge.

## 2023-05-07 NOTE — PLAN OF CARE
05/07/23 1356   Final Note   Assessment Type Final Discharge Note   Anticipated Discharge Disposition Home   Hospital Resources/Appts/Education Provided Appointments scheduled and added to AVS   Post-Acute Status   Discharge Delays None known at this time     [facility] has been placed for discharge transportation for this patient . Patient is returning home at discharge

## 2023-05-07 NOTE — PLAN OF CARE
Introduced as VN and will be reviewing discharge instructions.  Educated patient on reason for admission, home medication list, and discharge instructions including when to return to ED and the following doctor appointments.  Education per flowsheet.  Opportunity given for questions and questions answered.    Nurse notified of   completion of discharge education. Patient has to call family for ride home and she want s to eat dinner. Wheelchair can be requested when she is ready.

## 2023-05-07 NOTE — PLAN OF CARE
Problem: Adult Inpatient Plan of Care  Goal: Plan of Care Review  Outcome: Ongoing, Progressing  Goal: Patient-Specific Goal (Individualized)  Outcome: Ongoing, Progressing  Goal: Absence of Hospital-Acquired Illness or Injury  Outcome: Ongoing, Progressing  Goal: Optimal Comfort and Wellbeing  Outcome: Ongoing, Progressing  Goal: Readiness for Transition of Care  Outcome: Ongoing, Progressing     Problem: Fall Injury Risk  Goal: Absence of Fall and Fall-Related Injury  Outcome: Ongoing, Progressing     Problem: Behavior Regulation Impairment (Psychotic Signs/Symptoms)  Goal: Improved Behavioral Control (Psychotic Signs/Symptoms)  Outcome: Ongoing, Progressing     Problem: Cognitive Impairment (Psychotic Signs/Symptoms)  Goal: Optimal Cognitive Function (Psychotic Signs/Symptoms)  Outcome: Ongoing, Progressing     Problem: Decreased Participation and Engagement (Psychotic Signs/Symptoms)  Goal: Increased Participation and Engagement (Psychotic Signs/Symptoms)  Outcome: Ongoing, Progressing     Problem: Mood Impairment (Psychotic Signs/Symptoms)  Goal: Improved Mood Symptoms (Psychotic Signs/Symptoms)  Outcome: Ongoing, Progressing     Problem: Psychomotor Impairment (Psychotic Signs/Symptoms)  Goal: Improved Psychomotor Symptoms (Psychotic Signs/Symptoms)  Outcome: Ongoing, Progressing     Problem: Sensory Perception Impairment (Psychotic Signs/Symptoms)  Goal: Decreased Sensory Symptoms (Psychotic Signs/Symptoms)  Outcome: Ongoing, Progressing     Problem: Sleep Disturbance (Psychotic Signs/Symptoms)  Goal: Improved Sleep (Psychotic Signs/Symptoms)  Outcome: Ongoing, Progressing     Problem: Social, Occupational or Functional Impairment (Psychotic Signs/Symptoms)  Goal: Enhanced Social, Occupational or Functional Skills (Psychotic Signs/Symptoms)  Outcome: Ongoing, Progressing     Problem: Thought Process Alteration  Goal: Optimal Thought Clarity  Outcome: Ongoing, Progressing

## 2023-05-07 NOTE — NURSING
Assumed care of patient from NEHAL Fleming, patient is AAOX4, room air, vitals WNL, no c/o pain or discomfort, SBAX1, all safety precautions are in place, call bell and tray table are within reach of the patient and will continue to monitor.

## 2023-05-07 NOTE — DISCHARGE SUMMARY
[facility] Hospital Medicine Discharge Summary    Primary Team: [facility] Hospitalist Team A  Attending Physician: Miguel Miller MD  Resident:   Intern: Lennie    Date of Admit: 2023  Date of Discharge: 2023    Discharge to: Home  Condition: Improved    Discharge Diagnoses     Patient Active Problem List   Diagnosis    Cirrhosis, Laennec's    Thrombocytopenia    History of seizure    Glaucoma    hx of intentional Tylenol overdose    Alcohol abuse, in remission    Macrocytic anemia    Chronic liver failure    Malnutrition    Hepatic encephalopathy    Bipolar 1 disorder, depressed, severe    Elevated LFTs    Bipolar 1 disorder    Bipolar disorder, manic    SVT (supraventricular tachycardia)    Alcoholic cirrhosis    Hepatic cirrhosis    Metabolic acidosis       Consultants and Procedures     Consultants:  None    Procedures:   None    Imagin/5 CXR: No convincing evidence of acute cardiopulmonary disease   CTH without: no acute abnormality    Brief History of Present Illness      Marely Hamilton is a 58 y.o. female with a PMH of compensated alcoholic cirrhosis, seizure disorder on AED, and bipolar disorder who presents for confusion/hallucinations for 1 day noted by her sitter and mother. Patient does not always take her lactulose as prescribed per family. Patient was admitted for acute encephalopathy likely hepatic encephalopathy 2/2 not taking lactulose.     For the full HPI please refer to the History & Physical from this admission.    Day of Discharge Physical Exam:  General: sitting up in bed, alert and oriented, conversational, NAD  HEENT: PERRL, EOMI, moist mucus membranes   Neck: Trachea midline  Cardiovascular: RRR, no murmurs appreciated  Pulm: CTAB, no wheezes or crackles; no respiratory distress  Abdomen: Soft, non-tender, non-distended  Skin: No jaundice, rashes or erythema noted   Extremities: Atraumatic, no edema  Pulses: 2+ and symmetric  Neurological: no focal deficits; able to move all  extremities freely  Psychiatric: flat affect that is improved today    Hospital Course By Problem with Pertinent Findings     Acute encephalopathy, resolved  Hepatic encephalopathy  Compensated alcoholic cirrhosis   - patient presenting with 1 day of confusion  - family reports patient often does not take her lactulose as prescribed and becomes confused like this  - afebrile, no leukocytosis, TSH wnl, UDS negative, UA negative for infection, EtOH level negative  - lithium level was low so no concern for lithium toxicity  - no abdominal pain, abdomen soft and nontender, bedside US with no ascites  - on RA, CXR with no acute process  - CTH negative for acute process  - INR 1.7, MELD 17  - home meds: propanolol 20mg daily, lasix 10mg BID, spironolactone 25mg daily, lactulose 30g TID  - continued home meds  - mental status improved today  - resume lactulose for ~3 BM/day     Seizure disorder  - family denied witnessed seizure or post-ictal state  - no seizures witnessed in ED  - continue home keppra 1000mg bid, zyprexa 5mg qHS and zonisamide 100mg daily  - patient appears at her baseline mental status, no need for EEG     Bipolar 1 disorder  - continued home seroquel 100mg nightly and lithium 300mg daily  - lithium level 0.4 (subtherapeutic)     Chronic non-anion gap metabolic acidosis  - HCO3 21, AG 4, Cl 114  - continued home sodium bicarb 1300mg BID     Macrocytic anemia  - Hb 11.4,   - baseline Hb fluctuates, anywhere in range of 9-13  - B12 792 and folate 13.5 in 04/2023  - no acute issues     Thrombocytopenia  - likely 2/2 cirrhosis  - PT 17.7, INR 1.7  - Plt 88 within baseline range  - no acute issues     Healthcare Maintenance  - needs follow up with PCP for PAP, MMG, and CSC    Discharge Medications        Medication List        CONTINUE taking these medications      CONSTULOSE 10 gram/15 mL solution  Generic drug: lactulose     folic acid 1 MG tablet  Commonly known as: FOLVITE  Take 1 tablet (1 mg  total) by mouth once daily.     furosemide 20 MG tablet  Commonly known as: LASIX     HAIR VITAMINS ORAL     levETIRAcetam 500 MG Tab  Commonly known as: KEPPRA  Take 2 tablets (1,000 mg total) by mouth 2 (two) times daily.     lithium 300 mg tablet  Commonly known as: LITHOTAB     OLANZapine 5 MG tablet  Commonly known as: ZyPREXA     propranoloL 20 MG tablet  Commonly known as: INDERAL     QUEtiapine 100 MG Tab  Commonly known as: SEROQUEL     rifAXIMin 550 mg Tab  Commonly known as: XIFAXAN  Take 1 tablet (550 mg total) by mouth 2 (two) times daily.     sodium bicarbonate 650 MG tablet  Take 2 tablets (1,300 mg total) by mouth 2 (two) times daily.     spironolactone 25 MG tablet  Commonly known as: ALDACTONE     thiamine 100 MG tablet  Take 1 tablet (100 mg total) by mouth once daily.     zonisamide 100 MG Cap  Commonly known as: ZONEGRAN              Discharge Information:   Diet:  Adult regular    Physical Activity:  As tolerated             Instructions:  1. Take all medications as prescribed  2. Keep all follow-up appointments  3. Return to the hospital or call your primary care physicians if any worsening symptoms such as fever, chest pain, shortness of breath, return of symptoms, or any other concerns.    Follow-Up Appointments:  Follow up with PCP Dr. Ross  Referral placed to [facility] and [facility] for hospital follow up    Lydia Lawrence MD  [facility] Internal Medicine, HO-1

## 2023-05-09 ENCOUNTER — TELEPHONE (OUTPATIENT)
Dept: HEPATOLOGY | Facility: CLINIC | Age: 58
End: 2023-05-09
Payer: MEDICARE

## 2023-05-09 LAB — BACTERIA UR CULT: ABNORMAL

## 2023-05-09 NOTE — TELEPHONE ENCOUNTER
Referral to hepatology for decompensated cirrhosis due to alcohol, hepatic encephalopathy    Scheduling attempt # 2 (no showed recent MD appt)    Previous chart notes state that pt has been following with Dr. Boudreaux at [facility]    Please call pt and inquire if she wishes to schedule appt at [facility] or if she is continuing to f/u with [facility] ([facility])?   If wishes to schedule with us, please schedule pt for MD visit    If not, please route message back and I can delete referral    Thanks !

## 2023-05-10 LAB
BACTERIA BLD CULT: NORMAL
BACTERIA BLD CULT: NORMAL

## 2023-05-11 NOTE — PROGRESS NOTES
I attempted to call Ms. Hamilton twice to inform her of her repeat urinalysis results and positive urine culture. On third attempt, her mother answered the phone. Ms. Hamilton did not want to speak on the phone but told her mother she was no longer having dysuria or other symptoms of UTI. Will defer treatment of asymptomatic bacteruria at this time. Counseled patient on symptoms to look for including dysuria, increased frequency or urination, foul odor, suprapubic tenderness. Recommend contacting PCP if symptoms recur.     Bridget Argueta MD

## 2023-05-16 ENCOUNTER — TELEPHONE (OUTPATIENT)
Dept: ADMINISTRATIVE | Facility: OTHER | Age: 58
End: 2023-05-16
Payer: MEDICARE

## 2023-05-18 ENCOUNTER — HOSPITAL ENCOUNTER (INPATIENT)
Facility: HOSPITAL | Age: 58
LOS: 10 days | Discharge: ANOTHER HOSPITAL | DRG: 441 | End: 2023-05-28
Attending: EMERGENCY MEDICINE | Admitting: INTERNAL MEDICINE
Payer: MEDICARE

## 2023-05-18 DIAGNOSIS — K74.60 HEPATIC CIRRHOSIS: ICD-10-CM

## 2023-05-18 DIAGNOSIS — N39.0 URINARY TRACT INFECTION WITHOUT HEMATURIA, SITE UNSPECIFIED: ICD-10-CM

## 2023-05-18 DIAGNOSIS — R41.82 AMS (ALTERED MENTAL STATUS): ICD-10-CM

## 2023-05-18 DIAGNOSIS — D69.6 THROMBOCYTOPENIA: ICD-10-CM

## 2023-05-18 DIAGNOSIS — E72.20 HYPERAMMONEMIA: ICD-10-CM

## 2023-05-18 DIAGNOSIS — I47.10 SVT (SUPRAVENTRICULAR TACHYCARDIA): ICD-10-CM

## 2023-05-18 DIAGNOSIS — E83.52 HYPERCALCEMIA: ICD-10-CM

## 2023-05-18 DIAGNOSIS — E87.20 METABOLIC ACIDOSIS: ICD-10-CM

## 2023-05-18 DIAGNOSIS — K76.82 HEPATIC ENCEPHALOPATHY: Primary | ICD-10-CM

## 2023-05-18 DIAGNOSIS — K70.30 ALCOHOLIC CIRRHOSIS: ICD-10-CM

## 2023-05-18 DIAGNOSIS — K70.30 ALCOHOLIC CIRRHOSIS, UNSPECIFIED WHETHER ASCITES PRESENT: ICD-10-CM

## 2023-05-18 DIAGNOSIS — R68.89 LOW BODY TEMPERATURE: ICD-10-CM

## 2023-05-18 DIAGNOSIS — F31.9 BIPOLAR 1 DISORDER: ICD-10-CM

## 2023-05-18 DIAGNOSIS — E83.39 HYPOPHOSPHATEMIA: ICD-10-CM

## 2023-05-18 DIAGNOSIS — E87.0 HYPERNATREMIA: ICD-10-CM

## 2023-05-18 DIAGNOSIS — E87.5 HYPERKALEMIA: ICD-10-CM

## 2023-05-18 DIAGNOSIS — K74.60 HEPATIC CIRRHOSIS, UNSPECIFIED HEPATIC CIRRHOSIS TYPE, UNSPECIFIED WHETHER ASCITES PRESENT: ICD-10-CM

## 2023-05-18 DIAGNOSIS — E21.0 PRIMARY HYPERPARATHYROIDISM: ICD-10-CM

## 2023-05-18 DIAGNOSIS — F31.4 BIPOLAR 1 DISORDER, DEPRESSED, SEVERE: ICD-10-CM

## 2023-05-18 DIAGNOSIS — R73.9 HYPERGLYCEMIA: ICD-10-CM

## 2023-05-18 DIAGNOSIS — G93.40 ACUTE ENCEPHALOPATHY: ICD-10-CM

## 2023-05-18 DIAGNOSIS — D53.9 MACROCYTIC ANEMIA: ICD-10-CM

## 2023-05-18 DIAGNOSIS — N17.9 AKI (ACUTE KIDNEY INJURY): ICD-10-CM

## 2023-05-18 LAB
ALBUMIN SERPL BCP-MCNC: 2.1 G/DL (ref 3.5–5.2)
ALP SERPL-CCNC: 119 U/L (ref 55–135)
ALT SERPL W/O P-5'-P-CCNC: 23 U/L (ref 10–44)
AMMONIA PLAS-SCNC: 139 UMOL/L (ref 10–50)
AMPHET+METHAMPHET UR QL: NEGATIVE
ANION GAP SERPL CALC-SCNC: 6 MMOL/L (ref 8–16)
ANION GAP SERPL CALC-SCNC: 7 MMOL/L (ref 8–16)
ANISOCYTOSIS BLD QL SMEAR: SLIGHT
AST SERPL-CCNC: 48 U/L (ref 10–40)
BACTERIA #/AREA URNS HPF: ABNORMAL /HPF
BARBITURATES UR QL SCN>200 NG/ML: NEGATIVE
BASOPHILS # BLD AUTO: 0.02 K/UL (ref 0–0.2)
BASOPHILS NFR BLD: 0.2 % (ref 0–1.9)
BENZODIAZ UR QL SCN>200 NG/ML: NEGATIVE
BILIRUB DIRECT SERPL-MCNC: 1.8 MG/DL (ref 0.1–0.3)
BILIRUB SERPL-MCNC: 4.8 MG/DL (ref 0.1–1)
BILIRUB UR QL STRIP: NEGATIVE
BUN SERPL-MCNC: 56 MG/DL (ref 6–20)
BUN SERPL-MCNC: 70 MG/DL (ref 6–20)
BZE UR QL SCN: NEGATIVE
CA-I BLDV-SCNC: 1.6 MMOL/L (ref 1.06–1.42)
CALCIUM SERPL-MCNC: 11.4 MG/DL (ref 8.7–10.5)
CALCIUM SERPL-MCNC: 11.9 MG/DL (ref 8.7–10.5)
CANNABINOIDS UR QL SCN: NEGATIVE
CHLORIDE SERPL-SCNC: 123 MMOL/L (ref 95–110)
CHLORIDE SERPL-SCNC: 126 MMOL/L (ref 95–110)
CLARITY UR: ABNORMAL
CO2 SERPL-SCNC: 22 MMOL/L (ref 23–29)
CO2 SERPL-SCNC: 24 MMOL/L (ref 23–29)
COLOR UR: YELLOW
CREAT SERPL-MCNC: 1.1 MG/DL (ref 0.5–1.4)
CREAT SERPL-MCNC: 1.1 MG/DL (ref 0.5–1.4)
CREAT UR-MCNC: 89.9 MG/DL (ref 15–325)
DIFFERENTIAL METHOD: ABNORMAL
EOSINOPHIL # BLD AUTO: 0.1 K/UL (ref 0–0.5)
EOSINOPHIL NFR BLD: 0.5 % (ref 0–8)
ERYTHROCYTE [DISTWIDTH] IN BLOOD BY AUTOMATED COUNT: 18.4 % (ref 11.5–14.5)
EST. GFR  (NO RACE VARIABLE): 59 ML/MIN/1.73 M^2
EST. GFR  (NO RACE VARIABLE): 59 ML/MIN/1.73 M^2
ETHANOL SERPL-MCNC: <10 MG/DL
FERRITIN SERPL-MCNC: 110 NG/ML (ref 20–300)
GLUCOSE SERPL-MCNC: 203 MG/DL (ref 70–110)
GLUCOSE SERPL-MCNC: 98 MG/DL (ref 70–110)
GLUCOSE UR QL STRIP: NEGATIVE
HCT VFR BLD AUTO: 37.6 % (ref 37–48.5)
HGB BLD-MCNC: 11.5 G/DL (ref 12–16)
HGB UR QL STRIP: ABNORMAL
HYALINE CASTS #/AREA URNS LPF: 0 /LPF
HYPOCHROMIA BLD QL SMEAR: ABNORMAL
IMM GRANULOCYTES # BLD AUTO: 0.05 K/UL (ref 0–0.04)
IMM GRANULOCYTES NFR BLD AUTO: 0.4 % (ref 0–0.5)
INR PPP: 3 (ref 0.8–1.2)
IRON SERPL-MCNC: 132 UG/DL (ref 30–160)
KETONES UR QL STRIP: NEGATIVE
LACTATE SERPL-SCNC: 2.3 MMOL/L (ref 0.5–2.2)
LEUKOCYTE ESTERASE UR QL STRIP: ABNORMAL
LITHIUM SERPL-SCNC: 0.3 MMOL/L (ref 0.6–1.2)
LYMPHOCYTES # BLD AUTO: 2.6 K/UL (ref 1–4.8)
LYMPHOCYTES NFR BLD: 20.2 % (ref 18–48)
MAGNESIUM SERPL-MCNC: 2.7 MG/DL (ref 1.6–2.6)
MCH RBC QN AUTO: 32.6 PG (ref 27–31)
MCHC RBC AUTO-ENTMCNC: 30.6 G/DL (ref 32–36)
MCV RBC AUTO: 107 FL (ref 82–98)
METHADONE UR QL SCN>300 NG/ML: NEGATIVE
MICROSCOPIC COMMENT: ABNORMAL
MONOCYTES # BLD AUTO: 1.6 K/UL (ref 0.3–1)
MONOCYTES NFR BLD: 11.9 % (ref 4–15)
NEUTROPHILS # BLD AUTO: 8.7 K/UL (ref 1.8–7.7)
NEUTROPHILS NFR BLD: 66.8 % (ref 38–73)
NITRITE UR QL STRIP: NEGATIVE
NRBC BLD-RTO: 0 /100 WBC
OPIATES UR QL SCN: NEGATIVE
OSMOLALITY SERPL: 360 MOSM/KG (ref 275–295)
OVALOCYTES BLD QL SMEAR: ABNORMAL
PCP UR QL SCN>25 NG/ML: NEGATIVE
PH UR STRIP: 8 [PH] (ref 5–8)
PHOSPHATE SERPL-MCNC: 4.2 MG/DL (ref 2.7–4.5)
PLATELET # BLD AUTO: 147 K/UL (ref 150–450)
PLATELET BLD QL SMEAR: ABNORMAL
PMV BLD AUTO: 9.2 FL (ref 9.2–12.9)
POCT GLUCOSE: 185 MG/DL (ref 70–110)
POIKILOCYTOSIS BLD QL SMEAR: SLIGHT
POLYCHROMASIA BLD QL SMEAR: ABNORMAL
POTASSIUM SERPL-SCNC: 5 MMOL/L (ref 3.5–5.1)
POTASSIUM SERPL-SCNC: 5.5 MMOL/L (ref 3.5–5.1)
PROT SERPL-MCNC: 5.4 G/DL (ref 6–8.4)
PROT UR QL STRIP: ABNORMAL
PROTHROMBIN TIME: 30.9 SEC (ref 9–12.5)
PTH-INTACT SERPL-MCNC: 84.1 PG/ML (ref 9–77)
RBC # BLD AUTO: 3.53 M/UL (ref 4–5.4)
RBC #/AREA URNS HPF: 34 /HPF (ref 0–4)
SATURATED IRON: 102 % (ref 20–50)
SODIUM SERPL-SCNC: 153 MMOL/L (ref 136–145)
SODIUM SERPL-SCNC: 155 MMOL/L (ref 136–145)
SP GR UR STRIP: 1.02 (ref 1–1.03)
SQUAMOUS #/AREA URNS HPF: 17 /HPF
TOTAL IRON BINDING CAPACITY: 130 UG/DL (ref 250–450)
TOXICOLOGY INFORMATION: NORMAL
TRANSFERRIN SERPL-MCNC: 88 MG/DL (ref 200–375)
TROPONIN I SERPL DL<=0.01 NG/ML-MCNC: 0.04 NG/ML (ref 0–0.03)
TROPONIN I SERPL DL<=0.01 NG/ML-MCNC: 0.04 NG/ML (ref 0–0.03)
UNIDENT CRYS URNS QL MICRO: 33
URN SPEC COLLECT METH UR: ABNORMAL
UROBILINOGEN UR STRIP-ACNC: ABNORMAL EU/DL
WBC # BLD AUTO: 13 K/UL (ref 3.9–12.7)
WBC #/AREA URNS HPF: >100 /HPF (ref 0–5)
WBC CLUMPS URNS QL MICRO: ABNORMAL

## 2023-05-18 PROCEDURE — 93005 ELECTROCARDIOGRAM TRACING: CPT

## 2023-05-18 PROCEDURE — 82077 ASSAY SPEC XCP UR&BREATH IA: CPT | Performed by: EMERGENCY MEDICINE

## 2023-05-18 PROCEDURE — 93010 EKG 12-LEAD: ICD-10-PCS | Mod: ,,, | Performed by: INTERNAL MEDICINE

## 2023-05-18 PROCEDURE — 83735 ASSAY OF MAGNESIUM: CPT

## 2023-05-18 PROCEDURE — 85610 PROTHROMBIN TIME: CPT

## 2023-05-18 PROCEDURE — 82330 ASSAY OF CALCIUM: CPT

## 2023-05-18 PROCEDURE — 83930 ASSAY OF BLOOD OSMOLALITY: CPT

## 2023-05-18 PROCEDURE — 25000003 PHARM REV CODE 250

## 2023-05-18 PROCEDURE — 82248 BILIRUBIN DIRECT: CPT | Performed by: EMERGENCY MEDICINE

## 2023-05-18 PROCEDURE — 85025 COMPLETE CBC W/AUTO DIFF WBC: CPT | Performed by: PHYSICIAN ASSISTANT

## 2023-05-18 PROCEDURE — 87088 URINE BACTERIA CULTURE: CPT | Performed by: PHYSICIAN ASSISTANT

## 2023-05-18 PROCEDURE — 51701 INSERT BLADDER CATHETER: CPT

## 2023-05-18 PROCEDURE — 21400001 HC TELEMETRY ROOM

## 2023-05-18 PROCEDURE — 99291 CRITICAL CARE FIRST HOUR: CPT

## 2023-05-18 PROCEDURE — 80307 DRUG TEST PRSMV CHEM ANLYZR: CPT | Performed by: PHYSICIAN ASSISTANT

## 2023-05-18 PROCEDURE — 84466 ASSAY OF TRANSFERRIN: CPT

## 2023-05-18 PROCEDURE — 80053 COMPREHEN METABOLIC PANEL: CPT | Performed by: PHYSICIAN ASSISTANT

## 2023-05-18 PROCEDURE — 25000003 PHARM REV CODE 250: Performed by: EMERGENCY MEDICINE

## 2023-05-18 PROCEDURE — 84484 ASSAY OF TROPONIN QUANT: CPT | Mod: 91

## 2023-05-18 PROCEDURE — 93010 ELECTROCARDIOGRAM REPORT: CPT | Mod: ,,, | Performed by: INTERNAL MEDICINE

## 2023-05-18 PROCEDURE — 82140 ASSAY OF AMMONIA: CPT | Performed by: PHYSICIAN ASSISTANT

## 2023-05-18 PROCEDURE — 84484 ASSAY OF TROPONIN QUANT: CPT | Performed by: PHYSICIAN ASSISTANT

## 2023-05-18 PROCEDURE — 87077 CULTURE AEROBIC IDENTIFY: CPT | Performed by: PHYSICIAN ASSISTANT

## 2023-05-18 PROCEDURE — 63600175 PHARM REV CODE 636 W HCPCS: Performed by: INTERNAL MEDICINE

## 2023-05-18 PROCEDURE — 63600175 PHARM REV CODE 636 W HCPCS: Performed by: STUDENT IN AN ORGANIZED HEALTH CARE EDUCATION/TRAINING PROGRAM

## 2023-05-18 PROCEDURE — 63600175 PHARM REV CODE 636 W HCPCS

## 2023-05-18 PROCEDURE — 36415 COLL VENOUS BLD VENIPUNCTURE: CPT

## 2023-05-18 PROCEDURE — 84100 ASSAY OF PHOSPHORUS: CPT

## 2023-05-18 PROCEDURE — 25000003 PHARM REV CODE 250: Performed by: INTERNAL MEDICINE

## 2023-05-18 PROCEDURE — 87086 URINE CULTURE/COLONY COUNT: CPT | Performed by: PHYSICIAN ASSISTANT

## 2023-05-18 PROCEDURE — 83605 ASSAY OF LACTIC ACID: CPT

## 2023-05-18 PROCEDURE — 82728 ASSAY OF FERRITIN: CPT

## 2023-05-18 PROCEDURE — 63600175 PHARM REV CODE 636 W HCPCS: Performed by: EMERGENCY MEDICINE

## 2023-05-18 PROCEDURE — 80048 BASIC METABOLIC PNL TOTAL CA: CPT | Mod: XB

## 2023-05-18 PROCEDURE — 83970 ASSAY OF PARATHORMONE: CPT | Performed by: PHYSICIAN ASSISTANT

## 2023-05-18 PROCEDURE — 80178 ASSAY OF LITHIUM: CPT

## 2023-05-18 PROCEDURE — 87186 SC STD MICRODIL/AGAR DIL: CPT | Performed by: PHYSICIAN ASSISTANT

## 2023-05-18 PROCEDURE — 81000 URINALYSIS NONAUTO W/SCOPE: CPT | Mod: 59 | Performed by: PHYSICIAN ASSISTANT

## 2023-05-18 RX ORDER — ALBUTEROL SULFATE 2.5 MG/.5ML
10 SOLUTION RESPIRATORY (INHALATION) ONCE
Status: DISCONTINUED | OUTPATIENT
Start: 2023-05-18 | End: 2023-05-20

## 2023-05-18 RX ORDER — SODIUM CHLORIDE 0.9 % (FLUSH) 0.9 %
10 SYRINGE (ML) INJECTION
Status: DISCONTINUED | OUTPATIENT
Start: 2023-05-18 | End: 2023-05-28 | Stop reason: HOSPADM

## 2023-05-18 RX ORDER — LACTULOSE 10 G/15ML
30 SOLUTION ORAL 3 TIMES DAILY
Status: DISCONTINUED | OUTPATIENT
Start: 2023-05-18 | End: 2023-05-19

## 2023-05-18 RX ORDER — LACTULOSE 10 G/15ML
30 SOLUTION ORAL 2 TIMES DAILY
Status: DISCONTINUED | OUTPATIENT
Start: 2023-05-18 | End: 2023-05-18

## 2023-05-18 RX ORDER — QUETIAPINE FUMARATE 100 MG/1
100 TABLET, FILM COATED ORAL NIGHTLY
Status: DISCONTINUED | OUTPATIENT
Start: 2023-05-18 | End: 2023-05-19

## 2023-05-18 RX ORDER — LACTULOSE 10 G/15ML
10 SOLUTION ORAL
Status: DISCONTINUED | OUTPATIENT
Start: 2023-05-18 | End: 2023-05-18

## 2023-05-18 RX ORDER — ZONISAMIDE 100 MG/1
100 CAPSULE ORAL DAILY
Status: DISCONTINUED | OUTPATIENT
Start: 2023-05-18 | End: 2023-05-19

## 2023-05-18 RX ORDER — PROPRANOLOL HYDROCHLORIDE 10 MG/1
20 TABLET ORAL DAILY
Status: DISCONTINUED | OUTPATIENT
Start: 2023-05-18 | End: 2023-05-19

## 2023-05-18 RX ORDER — SPIRONOLACTONE 25 MG/1
25 TABLET ORAL DAILY
Status: DISCONTINUED | OUTPATIENT
Start: 2023-05-18 | End: 2023-05-18

## 2023-05-18 RX ORDER — LEVETIRACETAM 500 MG/1
1000 TABLET ORAL 2 TIMES DAILY
Status: DISCONTINUED | OUTPATIENT
Start: 2023-05-18 | End: 2023-05-19

## 2023-05-18 RX ORDER — OLANZAPINE 2.5 MG/1
5 TABLET ORAL NIGHTLY
Status: DISCONTINUED | OUTPATIENT
Start: 2023-05-18 | End: 2023-05-19

## 2023-05-18 RX ORDER — SODIUM CHLORIDE, SODIUM LACTATE, POTASSIUM CHLORIDE, CALCIUM CHLORIDE 600; 310; 30; 20 MG/100ML; MG/100ML; MG/100ML; MG/100ML
INJECTION, SOLUTION INTRAVENOUS CONTINUOUS
Status: DISCONTINUED | OUTPATIENT
Start: 2023-05-18 | End: 2023-05-19

## 2023-05-18 RX ORDER — FOLIC ACID 1 MG/1
1 TABLET ORAL DAILY
Status: DISCONTINUED | OUTPATIENT
Start: 2023-05-18 | End: 2023-05-19

## 2023-05-18 RX ORDER — SODIUM BICARBONATE 650 MG/1
1300 TABLET ORAL 2 TIMES DAILY
Status: DISCONTINUED | OUTPATIENT
Start: 2023-05-18 | End: 2023-05-18

## 2023-05-18 RX ORDER — LITHIUM CARBONATE 150 MG/1
300 CAPSULE ORAL DAILY
Status: DISCONTINUED | OUTPATIENT
Start: 2023-05-18 | End: 2023-05-19

## 2023-05-18 RX ORDER — THIAMINE HCL 100 MG
100 TABLET ORAL DAILY
Status: DISCONTINUED | OUTPATIENT
Start: 2023-05-18 | End: 2023-05-19

## 2023-05-18 RX ORDER — TALC
6 POWDER (GRAM) TOPICAL NIGHTLY PRN
Status: DISCONTINUED | OUTPATIENT
Start: 2023-05-18 | End: 2023-05-19

## 2023-05-18 RX ADMIN — LACTULOSE 30 G: 20 SOLUTION ORAL at 09:05

## 2023-05-18 RX ADMIN — PROPRANOLOL HYDROCHLORIDE 20 MG: 10 TABLET ORAL at 01:05

## 2023-05-18 RX ADMIN — SODIUM CHLORIDE, POTASSIUM CHLORIDE, SODIUM LACTATE AND CALCIUM CHLORIDE: 600; 310; 30; 20 INJECTION, SOLUTION INTRAVENOUS at 04:05

## 2023-05-18 RX ADMIN — LITHIUM CARBONATE 300 MG: 150 CAPSULE, GELATIN COATED ORAL at 04:05

## 2023-05-18 RX ADMIN — ZONISAMIDE 100 MG: 100 CAPSULE ORAL at 04:05

## 2023-05-18 RX ADMIN — CEFEPIME 1 G: 1 INJECTION, POWDER, FOR SOLUTION INTRAMUSCULAR; INTRAVENOUS at 05:05

## 2023-05-18 RX ADMIN — SODIUM ZIRCONIUM CYCLOSILICATE 5 G: 5 POWDER, FOR SUSPENSION ORAL at 09:05

## 2023-05-18 RX ADMIN — CEFTRIAXONE SODIUM 2 G: 2 INJECTION, POWDER, FOR SOLUTION INTRAMUSCULAR; INTRAVENOUS at 01:05

## 2023-05-18 RX ADMIN — LEVETIRACETAM 1000 MG: 500 TABLET, FILM COATED ORAL at 09:05

## 2023-05-18 RX ADMIN — RIFAXIMIN 550 MG: 550 TABLET ORAL at 09:05

## 2023-05-18 RX ADMIN — DEXTROSE 250 ML: 10 SOLUTION INTRAVENOUS at 09:05

## 2023-05-18 RX ADMIN — SPIRONOLACTONE 25 MG: 25 TABLET, FILM COATED ORAL at 01:05

## 2023-05-18 RX ADMIN — Medication 100 MG: at 01:05

## 2023-05-18 RX ADMIN — SODIUM CHLORIDE, POTASSIUM CHLORIDE, SODIUM LACTATE AND CALCIUM CHLORIDE 1000 ML: 600; 310; 30; 20 INJECTION, SOLUTION INTRAVENOUS at 03:05

## 2023-05-18 RX ADMIN — VANCOMYCIN HYDROCHLORIDE 750 MG: 750 INJECTION, POWDER, LYOPHILIZED, FOR SOLUTION INTRAVENOUS at 04:05

## 2023-05-18 RX ADMIN — MULTIVITAMIN 15 ML: LIQUID ORAL at 04:05

## 2023-05-18 RX ADMIN — FOLIC ACID 1 MG: 1 TABLET ORAL at 01:05

## 2023-05-18 RX ADMIN — INSULIN HUMAN 10 UNITS: 100 INJECTION, SOLUTION PARENTERAL at 10:05

## 2023-05-18 NOTE — PROGRESS NOTES
Pharmacokinetic Initial Assessment: IV Vancomycin    Assessment/Plan:    Initiate intravenous vancomycin with loading dose of 750 mg once with subsequent doses when random concentrations are less than 20 mcg/mL  Desired empiric serum trough concentration is 15 to 20 mcg/mL  Draw vancomycin random level on 05/19/23 at 1715.  Pharmacy will continue to follow and monitor vancomycin.      Please contact pharmacy at extension [phone] with any questions regarding this assessment.     Thank you for the consult,   Marietta Hess       Patient brief summary:  Marely Hamilton is a 58 y.o. female initiated on antimicrobial therapy with IV Vancomycin for treatment of suspected urinary tract infection    Drug Allergies:   Review of patient's allergies indicates:   Allergen Reactions    Sulfa (sulfonamide antibiotics) Rash    Codeine Nausea And Vomiting       Actual Body Weight:   41.8 kg    Renal Function:   Estimated Creatinine Clearance: 37.2 mL/min (based on SCr of 1.1 mg/dL).,     Dialysis Method (if applicable):       CBC (last 72 hours):  Recent Labs   Lab Result Units 05/18/23  1140   WBC K/uL 13.00*   Hemoglobin g/dL 11.5*   Hematocrit % 37.6   Platelets K/uL 147*   Gran % % 66.8   Lymph % % 20.2   Mono % % 11.9   Eosinophil % % 0.5   Basophil % % 0.2   Differential Method  Automated       Metabolic Panel (last 72 hours):  Recent Labs   Lab Result Units 05/18/23  1127 05/18/23  1140 05/18/23  1527   Sodium mmol/L  --  155*  --    Potassium mmol/L  --  5.0  --    Chloride mmol/L  --  126*  --    CO2 mmol/L  --  22*  --    Glucose mg/dL  --  98  --    Glucose, UA  Negative  --   --    BUN mg/dL  --  56*  --    Creatinine mg/dL  --  1.1  --    Creatinine, Urine mg/dL 89.9  --   --    Albumin g/dL  --  2.1*  --    Total Bilirubin mg/dL  --  4.8*  --    Alkaline Phosphatase U/L  --  119  --    AST U/L  --  48*  --    ALT U/L  --  23  --    Magnesium mg/dL  --   --  2.7*   Phosphorus mg/dL  --   --  4.2       Drug levels (last  3 results):  No results for input(s): VANCOMYCINRA, VANCORANDOM, VANCOMYCINPE, VANCOPEAK, VANCOMYCINTR, VANCOTROUGH in the last 72 hours.    Microbiologic Results:  Microbiology Results (last 7 days)       Procedure Component Value Units Date/Time    Urine culture [[id number]] Collected: 05/18/23 1127    Order Status: No result Specimen: Urine Updated: 05/18/23 1202

## 2023-05-18 NOTE — ED PROVIDER NOTES
Encounter Date: 5/18/2023       History     Chief Complaint   Patient presents with    Altered Mental Status     CNA called EMS for AMS, GCS 14 pt confused to situation, jaundice, pt denies any complaints     Patient is a 58-year-old female with a past history of hepatic encephalopathy, hepatic enlargement, Laennec's cirrhosis, anxiety, anemia, alcohol abuse who presents to the ED via EMS with complaint of altered mental status.  Per EMS, patient was found to be altered from her baseline per CNA.  Also reports of jaundice, confusion.  Patient able to give me additional information secondary to altered mental status.    Review of patient's allergies indicates:   Allergen Reactions    Sulfa (sulfonamide antibiotics) Rash    Codeine Nausea And Vomiting     Past Medical History:   Diagnosis Date    Addiction to drug     Alcohol abuse     Alcohol abuse, in remission 6/15/2015    14.5 weeks ago; AA weekly    Anemia     Anxiety 6/15/2015    Behavioral problem     Bipolar disorder     Bipolar disorder in remission 6/15/2015    Cirrhosis, Laennec's 6/15/2015    Depression     Encounter for blood transfusion     Epistaxis 6/15/2015    Fatigue     Glaucoma     Hematuria     Hepatic encephalopathy 6/15/2015    Hepatic enlargement     History of psychiatric care     History of psychiatric hospitalization     History of seizure 6/15/2015    1    hx of intentional Tylenol overdose 6/15/2015    2005- situational and hx of bipolar    Hx of psychiatric care     Macrocytic anemia 9/18/2015    6 units PRBC since June 2015    Jeana     Osteoarthritis     Other ascites 6/15/2015    Psychiatric exam requested by authority     Psychiatric problem     Psychosis 9/26/2019    Renal disorder     Seizures     Self-harming behavior     Suicide attempt     Therapy     Thrombocytopenia 6/15/2015     Past Surgical History:   Procedure Laterality Date    COSMETIC SURGERY      ESOPHAGOGASTRODUODENOSCOPY       Family History   Problem Relation Age of  Onset    Alcohol abuse Father     Suicide Father     Bipolar disorder Father     Alcohol abuse Sister     Hypertension Mother     Alcohol abuse Paternal Grandfather     Drug abuse Neg Hx     Dementia Neg Hx      Social History     Tobacco Use    Smoking status: Never    Smokeless tobacco: Never   Substance Use Topics    Alcohol use: Not Currently     Comment: hx of ETOH abuse with cirrhosis of liver    Drug use: No     Review of Systems   Unable to perform ROS: Mental status change     Physical Exam     Initial Vitals [05/18/23 1124]   BP Pulse Resp Temp SpO2   131/78 102 20 99.4 °F (37.4 °C) (!) 94 %      MAP       --         Physical Exam    Nursing note and vitals reviewed.  Constitutional: She appears well-developed.   HENT:   Head: Normocephalic and atraumatic.   Right Ear: External ear normal.   Left Ear: External ear normal.   Eyes: EOM are normal. Pupils are equal, round, and reactive to light.   Scleral icterus bilaterally   Neck: Neck supple.   Normal range of motion.  Cardiovascular:  Normal rate, regular rhythm and normal heart sounds.           Pulmonary/Chest: Breath sounds normal.   Abdominal: Abdomen is soft. Bowel sounds are normal.   No hepatomegaly; no spider angioma; no right upper quadrant tenderness   Musculoskeletal:         General: No tenderness or edema. Normal range of motion.      Cervical back: Normal range of motion and neck supple.     Neurological: She is alert. She has normal strength.   Patient is alert and oriented to person only.  She is not oriented to place or time or year.  She is primarily giving me nonsensical answers to my questions.  Asterixis.   Skin: Skin is warm. Capillary refill takes less than 2 seconds. No erythema.   Jaundiced   Psychiatric: She has a normal mood and affect.       ED Course   Critical Care    Date/Time: 5/18/2023 3:02 PM  Performed by: Gagandeep Peralta MD  Authorized by: Gagandeep Peralta MD   Direct patient critical care time: 10  minutes  Additional history critical care time: 5 minutes  Ordering / reviewing critical care time: 5 minutes  Documentation critical care time: 5 minutes  Consulting other physicians critical care time: 6 minutes  Consult with family critical care time: 0 minutes  Other critical care time: 2 minutes  Total critical care time (exclusive of procedural time) : 33 minutes  Critical care was necessary to treat or prevent imminent or life-threatening deterioration of the following conditions: metabolic crisis and hepatic failure.      Labs Reviewed   CBC W/ AUTO DIFFERENTIAL - Abnormal; Notable for the following components:       Result Value    WBC 13.00 (*)     RBC 3.53 (*)     Hemoglobin 11.5 (*)      (*)     MCH 32.6 (*)     MCHC 30.6 (*)     RDW 18.4 (*)     Platelets 147 (*)     Gran # (ANC) 8.7 (*)     Immature Grans (Abs) 0.05 (*)     Mono # 1.6 (*)     All other components within normal limits   COMPREHENSIVE METABOLIC PANEL - Abnormal; Notable for the following components:    Sodium 155 (*)     Chloride 126 (*)     CO2 22 (*)     BUN 56 (*)     Calcium 11.9 (*)     Total Protein 5.4 (*)     Albumin 2.1 (*)     Total Bilirubin 4.8 (*)     AST 48 (*)     Anion Gap 7 (*)     eGFR 59 (*)     All other components within normal limits   URINALYSIS, REFLEX TO URINE CULTURE - Abnormal; Notable for the following components:    Appearance, UA Cloudy (*)     Protein, UA 2+ (*)     Occult Blood UA 3+ (*)     Urobilinogen, UA 2.0-3.0 (*)     Leukocytes, UA 3+ (*)     All other components within normal limits    Narrative:     Specimen Source->Urine   AMMONIA - Abnormal; Notable for the following components:    Ammonia 139 (*)     All other components within normal limits   TROPONIN I - Abnormal; Notable for the following components:    Troponin I 0.037 (*)     All other components within normal limits   BILIRUBIN, DIRECT - Abnormal; Notable for the following components:    Bilirubin, Direct 1.8 (*)     All other  components within normal limits   URINALYSIS MICROSCOPIC - Abnormal; Notable for the following components:    RBC, UA 34 (*)     WBC, UA >100 (*)     WBC Clumps, UA Many (*)     Bacteria Many (*)     All other components within normal limits    Narrative:     Specimen Source->Urine   CULTURE, URINE   DRUG SCREEN PANEL, URINE EMERGENCY    Narrative:     Specimen Source->Urine   ALCOHOL,MEDICAL (ETHANOL)   TROPONIN I   IRON AND TIBC   FERRITIN   PROTIME-INR   MAGNESIUM   PHOSPHORUS   UREA NITROGEN, URINE, RANDOM   CREATININE, URINE, RANDOM   LACTIC ACID, PLASMA   OSMOLALITY, URINE RANDOM   OSMOLALITY, SERUM   URINALYSIS, REFLEX TO URINE CULTURE   CALCIUM, IONIZED   LITHIUM LEVEL     EKG Readings: (Independently Interpreted)   Initial Reading: No STEMI. Rhythm: Normal Sinus Rhythm. Heart Rate: 100. Ectopy: No Ectopy. ST Segments: Normal ST Segments. T Waves: Normal.   NSR; no ischemic change; no STEMI    ECG Results              EKG 12-lead (Final result)  Result time 05/18/23 11:55:59      Final result by Interface, Lab In [facility] (05/18/23 11:55:59)                   Narrative:    Test Reason : R41.82,    Vent. Rate : 100 BPM     Atrial Rate : 100 BPM     P-R Int : 116 ms          QRS Dur : 070 ms      QT Int : 332 ms       P-R-T Axes : 063 -15 068 degrees     QTc Int : 428 ms    Normal sinus rhythm  Normal ECG  When compared with ECG of 05-MAY-2023 11:30,  Vent. rate has increased BY  47 BPM  Nonspecific T wave abnormality no longer evident in Inferior leads  T wave inversion less evident in Anterior leads  QT has shortened  Confirmed by Raulito KENDALL MD, Garo BERMUDEZ ([id number]) on 5/18/2023 11:55:45 AM    Referred By: System System           Confirmed By:Garo Cabezas III, MD                                  Imaging Results              CT Head Without Contrast (Final result)  Result time 05/18/23 14:08:10      Final result by Mumtaz Penny MD (05/18/23 14:08:10)                   Impression:      No acute  intracranial findings.    No significant change relative to prior head CT 05/05/2023.      Electronically signed by: Mumtaz Penny  Date:    05/18/2023  Time:    14:08               Narrative:    EXAMINATION:  CT HEAD WITHOUT CONTRAST    CLINICAL HISTORY:  Mental status change, unknown cause; Hepatic encephalopathy    TECHNIQUE:  Low dose axial images were obtained through the head.  Coronal and sagittal reformations were also performed. Contrast was not administered.    COMPARISON:  CT head 05/05/2023.    FINDINGS:  Blood: No acute intracranial hemorrhage.    Parenchyma: No definite loss of gray-white differentiation to suggest acute or subacute transcortical infarct. Parenchymal volume loss.  Nonspecific areas of white matter hypoattenuation may relate to sequela of chronic small vessel ischemic disease.    Ventricles/Extra-axial spaces: No abnormal extra-axial fluid collection. Basal cisterns are patent.    Vessels: Moderate atherosclerotic calcification.    Orbits: Status post bilateral lens replacements    Scalp: Unremarkable.    Skull: There are no depressed skull fractures or destructive bone lesions.    Sinuses and mastoids: Scattered relatively modest paranasal sinus mucosal thickening with small retention cysts.    Other findings: Partially imaged sequela of cosmetic surgery, with suggested silicone implants in the bilateral pre malar soft tissues.                                       X-Ray Chest 1 View (Final result)  Result time 05/18/23 12:00:12      Final result by Yossi Espinosa IV, MD (05/18/23 12:00:12)                   Impression:      No acute intrathoracic abnormality.      Electronically signed by: Yossi Espinosa  Date:    05/18/2023  Time:    12:00               Narrative:    EXAMINATION:  XR CHEST 1 VIEW    CLINICAL HISTORY:  AMS;    TECHNIQUE:  Single frontal view of the chest was performed.    COMPARISON:  Chest radiograph from 05/05/2023.    FINDINGS:  Mediastinal structures are  midline.  Normal mediastinal and hilar contours.  Normal size cardiac silhouette.    Lungs are symmetrically expanded.  No focal consolidation.  No pneumothorax.  No significant pleural fluid.    Probable cholelithiasis, similar to prior exam.  Osseous structures appear intact.                                       Medications   levETIRAcetam tablet 1,000 mg (has no administration in time range)   lithium capsule (has no administration in time range)   OLANZapine tablet 5 mg (has no administration in time range)   propranoloL tablet 20 mg (20 mg Oral Given 5/18/23 1320)   QUEtiapine tablet 100 mg (has no administration in time range)   rifAXIMin tablet 550 mg (has no administration in time range)   spironolactone tablet 25 mg (25 mg Oral Given 5/18/23 1320)   thiamine tablet 100 mg (100 mg Oral Given 5/18/23 1321)   zonisamide capsule 100 mg (has no administration in time range)   lactulose 20 gram/30 mL solution Soln 30 g (has no administration in time range)   folic acid tablet 1 mg (1 mg Oral Given 5/18/23 1321)   multivit-min-ferrous gluconate 9 mg iron/15 mL oral liquid 15 mL (has no administration in time range)   sodium chloride 0.9% flush 10 mL (has no administration in time range)   melatonin tablet 6 mg (has no administration in time range)   lactated ringers bolus 1,000 mL (has no administration in time range)   lactated ringers infusion (has no administration in time range)   vancomycin - pharmacy to dose (has no administration in time range)   ceFEPIme (MAXIPIME) 1 g in dextrose 5 % in water (D5W) 5 % 50 mL IVPB (MB+) (has no administration in time range)   cefTRIAXone (ROCEPHIN) 2 g in dextrose 5 % in water (D5W) 5 % 50 mL IVPB (MB+) (0 g Intravenous Stopped 5/18/23 1353)     Medical Decision Making:   Initial Assessment:   58-year-old female with past history of hepatic encephalopathy, alcohol abuse presents with 1 day of altered mental status; patient oriented to person only  Differential Diagnosis:    Hepatic encephalopathy, ascending cholangitis, alcohol intoxication, hyperammonemia  Clinical Tests:   Radiological Study: Ordered and Reviewed  Medical Tests: Ordered and Reviewed  ED Management:  - lab findings notable for mildly elevated troponin, hyper ammonia anemia, elevated T bili, hypernatremia urinary tract infection; patient administered IV fluids and Rocephin; will admit to the [facility] Medicine service as a bounce-back as the patient was recently admitted for the same complaint less than 2 weeks ago           ED Course as of 05/18/23 1503   u May 18, 2023   1500 WBC(!): 13.00  Reviewed by self - mild elevation.  [LC]   1500 Sodium(!): 155  Reviewed by self-abnormally elevated. [LC]   1500 Bilirubin Direct(!): 1.8  Reviewed by self-abnormal elevation T bili. [LC]   1500 Troponin I(!): 0.037 [LC]   1501 Ammonia(!): 139  Elevated - will administer lactulose.  [LC]   1501 Drug screen panel, emergency  Reviewed by self - WNL.  [LC]   1501 Leukocytes, UA(!): 3+  Reviewed by self - WNL.  [LC]   1502 X-Ray Chest 1 View  No acute cardiopulmonary process per my independent interpretation.  [LC]   1502 No acute abnormality such as mass effect, edema or obvious ICH noted per my interpretation.  [LC]      ED Course User Index  [LC] Gagandeep Peralta MD                 Clinical Impression:   Final diagnoses:  [R41.82] AMS (altered mental status)  [K76.82] Hepatic encephalopathy (Primary)  [E72.20] Hyperammonemia  [N39.0] Urinary tract infection without hematuria, site unspecified        ED Disposition Condition    Admit                 Gagandeep Peralta MD  05/18/23 1501

## 2023-05-18 NOTE — H&P
[facility] Medicine H&P Note     Admitting Team: [facility] Hospitalist Team B  Attending Physician: Salomón Pyle MD  Resident: Soila  Intern: Rajinder    Date of Admit: 5/18/2023    Chief Complaint     Altered Mental Status (CNA called EMS for AMS, GCS 14 pt confused to situation, jaundice, pt denies any complaints)   for 1 day    Subjective:      History of Present Illness:  Marely Hamilton is a 58 y.o. female w/ PMHx of EtOH induced cirrhosis, seizure on AEDs, and bipolar disorder who presented to [facility] on 5/18/2023 with a primary complaint of altered mental status for 1 day.    The patient was in their usual state of health until earlier today when her sitter (CNA) found her to be altered. She is unable to give any pertinent hx d/t her altered status.    She has a hx of episodic non-compliance w/ home medications including lactulose and psych meds. At baseline, she is ambulatory and able to perform ADLs.      Past Medical History:  Past Medical History:   Diagnosis Date    Addiction to drug     Alcohol abuse     Alcohol abuse, in remission 6/15/2015    14.5 weeks ago; AA weekly    Anemia     Anxiety 6/15/2015    Behavioral problem     Bipolar disorder     Bipolar disorder in remission 6/15/2015    Cirrhosis, Laennec's 6/15/2015    Depression     Encounter for blood transfusion     Epistaxis 6/15/2015    Fatigue     Glaucoma     Hematuria     Hepatic encephalopathy 6/15/2015    Hepatic enlargement     History of psychiatric care     History of psychiatric hospitalization     History of seizure 6/15/2015    1    hx of intentional Tylenol overdose 6/15/2015    2005- situational and hx of bipolar    Hx of psychiatric care     Macrocytic anemia 9/18/2015    6 units PRBC since June 2015    Jeana     Osteoarthritis     Other ascites 6/15/2015    Psychiatric exam requested by authority     Psychiatric problem     Psychosis 9/26/2019    Renal disorder     Seizures     Self-harming behavior      Suicide attempt     Therapy     Thrombocytopenia 6/15/2015       Past Surgical History:  Past Surgical History:   Procedure Laterality Date    COSMETIC SURGERY      ESOPHAGOGASTRODUODENOSCOPY         Allergies:  Review of patient's allergies indicates:   Allergen Reactions    Sulfa (sulfonamide antibiotics) Rash    Codeine Nausea And Vomiting       Home Medications:  Prior to Admission medications    Medication Sig Start Date End Date Taking? Authorizing Provider   CONSTULOSE 10 gram/15 mL solution Take 45 mLs by mouth 2 (two) times daily. 4/17/23   Historical Provider   folic acid (FOLVITE) 1 MG tablet Take 1 tablet (1 mg total) by mouth once daily. 2/13/23 2/13/24  Seth Noyola MD   furosemide (LASIX) 20 MG tablet Take 10 mg by mouth 2 (two) times daily.    Historical Provider   levETIRAcetam (KEPPRA) 500 MG Tab Take 2 tablets (1,000 mg total) by mouth 2 (two) times daily. 3/3/23   Robina Esquivel NP   lithium (LITHOTAB) 300 mg tablet Take 300 mg by mouth once daily. 4/10/23   Historical Provider   multivitamin with iron (HAIR VITAMINS ORAL) Take by mouth.    Historical Provider   OLANZapine (ZYPREXA) 5 MG tablet Take 5 mg by mouth every evening. 4/26/23   Historical Provider   propranoloL (INDERAL) 20 MG tablet Take 20 mg by mouth once daily. 12/3/22   Historical Provider   QUEtiapine (SEROQUEL) 100 MG Tab Take 100 mg by mouth every evening. 4/10/23   Historical Provider   rifAXIMin (XIFAXAN) 550 mg Tab Take 1 tablet (550 mg total) by mouth 2 (two) times daily. 3/13/23 6/11/23  Driss Hallman MD   sodium bicarbonate 650 MG tablet Take 2 tablets (1,300 mg total) by mouth 2 (two) times daily. 2/14/23 2/14/24  Seth Noyola MD   spironolactone (ALDACTONE) 25 MG tablet Take 25 mg by mouth once daily. 12/3/22   Historical Provider   thiamine 100 MG tablet Take 1 tablet (100 mg total) by mouth once daily. 3/3/23   Robina Esquivel NP   zonisamide (ZONEGRAN) 100 MG Cap Take 100 mg by mouth once  daily. 22   Historical Provider       Family History:  Family History   Problem Relation Age of Onset    Alcohol abuse Father     Suicide Father     Bipolar disorder Father     Alcohol abuse Sister     Hypertension Mother     Alcohol abuse Paternal Grandfather     Drug abuse Neg Hx     Dementia Neg Hx        Social History:  Social History     Tobacco Use    Smoking status: Never    Smokeless tobacco: Never   Substance Use Topics    Alcohol use: Not Currently     Comment: hx of ETOH abuse with cirrhosis of liver    Drug use: No       Review of Systems:  Unable to fully assess d/t altered status.    Health Maintaince :   Primary Care Physician: Viktor Ross MD    Immunizations:   Immunization History   Administered Date(s) Administered    COVID-19, MRNA, LN-S, PF (MODERNA FULL 0.5 ML DOSE) 2021, 2021, 2022       Tdap: not UTD  Flu: not UTD  Pna: not UTD  COVID: 2 doses    Cancer Screening:  PAP: not UTD  MMG: not UTD  Colonoscopy: not UTD     Objective:   Last 24 Hour Vital Signs:  BP  Min: 131/78  Max: 143/77  Temp  Av.4 °F (37.4 °C)  Min: 99.4 °F (37.4 °C)  Max: 99.4 °F (37.4 °C)  Pulse  Av.5  Min: 99  Max: 102  Resp  Av  Min: 16  Max: 20  SpO2  Av %  Min: 94 %  Max: 97 %  There is no height or weight on file to calculate BMI.  No intake/output data recorded.    Physical Examination:  General: thin adult female sitting up in bed in NAD  HEENT: NC/AT, EOMI, scleral icterus  Neck: Supple, trachea midline  CV: RRR, normal S1/S2, 2/6 JOE  Resp: CTAB, no wheezing or rhonchi appreciated, normal respiratory effort  Abd: Soft, NT/ND, normoactive BSx4  Extremities: 2+ pulses, no edema  Skin: Warm, dry, intact, jaundice, facial spider angiomas  Neuro: Oriented only to self, unable to assess motor strength  Psych: Pleasant w/ blank affect, cooperative w/ exam        Laboratory:  Most Recent Data:  CBC:   Lab Results   Component Value Date    WBC 13.00 (H) 2023    HGB 11.5  (L) 05/18/2023    HCT 37.6 05/18/2023     (L) 05/18/2023     (H) 05/18/2023    RDW 18.4 (H) 05/18/2023     BMP:   Lab Results   Component Value Date     (H) 05/18/2023    K 5.0 05/18/2023     (H) 05/18/2023    CO2 22 (L) 05/18/2023    BUN 56 (H) 05/18/2023    CREATININE 1.1 05/18/2023    GLU 98 05/18/2023    CALCIUM 11.9 (H) 05/18/2023    MG 1.8 05/07/2023    PHOS 2.6 (L) 05/07/2023     LFTs:   Lab Results   Component Value Date    PROT 5.4 (L) 05/18/2023    ALBUMIN 2.1 (L) 05/18/2023    BILITOT 4.8 (H) 05/18/2023    AST 48 (H) 05/18/2023    ALKPHOS 119 05/18/2023    ALT 23 05/18/2023     (H) 06/02/2020     Coags:   Lab Results   Component Value Date    INR 1.7 (H) 05/05/2023     FLP:   Lab Results   Component Value Date    CHOL 138 12/11/2020    HDL 48 12/11/2020    LDLCALC 82.2 12/11/2020    TRIG 39 12/11/2020    CHOLHDL 34.8 12/11/2020     DM:   Lab Results   Component Value Date    HGBA1C 4.1 01/22/2021    HGBA1C 4.6 12/10/2020    HGBA1C 4.1 05/16/2020    LDLCALC 82.2 12/11/2020    CREATININE 1.1 05/18/2023     Thyroid:   Lab Results   Component Value Date    TSH 0.721 05/05/2023    FREET4 0.81 02/07/2023     Anemia:   Lab Results   Component Value Date    IRON 208 (H) 10/23/2015    TIBC 244 (L) 10/23/2015    FERRITIN 412 (H) 10/23/2015    JFHFHHAD80 944 02/10/2023    FOLATE 15.1 02/10/2023     Cardiac:   Lab Results   Component Value Date    TROPONINI 0.037 (H) 05/18/2023     Urinalysis:   Lab Results   Component Value Date    LABURIN PROTEUS MIRABILIS  > 100,000 cfu/ml   (A) 05/07/2023    COLORU Yellow 05/18/2023    SPECGRAV 1.020 05/18/2023    NITRITE Negative 05/18/2023    KETONESU Negative 05/18/2023    UROBILINOGEN 2.0-3.0 (A) 05/18/2023    WBCUA >100 (H) 05/18/2023       Trended Lab Data:  Recent Labs   Lab 05/18/23  1140   WBC 13.00*   HGB 11.5*   HCT 37.6   *   *   RDW 18.4*   *   K 5.0   *   CO2 22*   BUN 56*   CREATININE 1.1   GLU 98   PROT  5.4*   ALBUMIN 2.1*   BILITOT 4.8*   AST 48*   ALKPHOS 119   ALT 23       Trended Cardiac Data:  Recent Labs   Lab 05/18/23  1140   TROPONINI 0.037*       Microbiology Data:  Microbiology Results (last 7 days)       Procedure Component Value Units Date/Time    Urine culture [[id number]] Collected: 05/18/23 1127    Order Status: No result Specimen: Urine Updated: 05/18/23 1202             Other Results:  EKG (my interpretation): NSR. T wave inversion in V1/V2.    Radiology:  Imaging Results              CT Head Without Contrast (In process)                      X-Ray Chest 1 View (Final result)  Result time 05/18/23 12:00:12      Final result by Yossi Espinosa IV, MD (05/18/23 12:00:12)                   Impression:      No acute intrathoracic abnormality.      Electronically signed by: Yossi Espinosa  Date:    05/18/2023  Time:    12:00               Narrative:    EXAMINATION:  XR CHEST 1 VIEW    CLINICAL HISTORY:  AMS;    TECHNIQUE:  Single frontal view of the chest was performed.    COMPARISON:  Chest radiograph from 05/05/2023.    FINDINGS:  Mediastinal structures are midline.  Normal mediastinal and hilar contours.  Normal size cardiac silhouette.    Lungs are symmetrically expanded.  No focal consolidation.  No pneumothorax.  No significant pleural fluid.    Probable cholelithiasis, similar to prior exam.  Osseous structures appear intact.                                     Assessment and Plan:     Marely Hamilton is a 58 y.o. female w/ PMHx of EtOH induced cirrhosis, seizure on AEDs, and bipolar disorder who presented to [facility] on 5/18/2023 with a primary complaint of altered mental status for 1 day. Hx is limited d/t pt's altered status and difficult communication w/ pt's mother.    Acute Encephalopathy  EtOh-induced Cirrhosis  Pt found altered by sitter (CNA) earlier on day of admission. She has a hx of hepatic encephalopathy 2/2 medication non-compliance. Pt found to have  jaundice and scleral icterus on physical exam. Ammonia 139, Tbili 4.8,  in ED.  Plan:  - Likely etiology of hepatic encephalopathy 2/2 medication non-compliance (especially given past hx and PE findings) vs UTI vs hypernatremia  - Check CTH now  - Check lactate  - Check Li serum level  - Continue home lactulose, rifaximine, thiamine, spironolactone, and multivitamin  - Keep NPO pending Speech eval  - Continue to monitor    UTI  UA in ED w/ 3+ occult blood, 2.0-3.0 urobilinogen, 3+ leukocytes, 34 RBCs, >100 WBC.  Plan:  - Given CTX x 1 dose in ED  - Repeat UA w/ cx d/t dirty catch on initial UA  - Start Cefepime and Vanc given hx of CTX-resistant E. coli on 02/26/23  - Will f/u Ucx    Hypernatremia  Na 155 on CMP in ED.  Plan:  - Give 1 L LR bolus now, followed by LR infusion @ 75 mL/hr  - Hold home sodium bicarb  - Will recheck BMP at 1800  - Goal Na decrease of 6-8 in first 24 hrs  - Will continue to monitor    Elevated Cardiac Enzymes  Troponin mildly elevated to 0.037 in ED.  Plan:  - Likely d/t demand in setting of hypovolemia  - EKG did not demonstrate new abnormalities  - Trend troponin until peak  - Continue to routinely monitor vitals    AYESHA  Cr 1.1 on CMP in ED, baseline 0.6.  Plan:  - Likely pre-renal in etiology given hypovolemia  - Check labs to calculate FEUrea  - Giving 1 L LR bolus now, then LR infusion @ 75 mL/hr  - Renally dose meds  - Will continue to monitor on morning labs    Seizure Disorder  Pt has hx of seizures and is currently on Keppra 1000 mg BID and Zonisamide 100 mg daily at home. No reported seizure for several years. Per chart review, seizures appear to have taken place in setting of EtOH withdrawal.  Plan:  - Continue home Keppra and Zonisamide  - Continue to monitor  - Recommend Neuro f/u at discharge    Bipolar Disorder  H/o Self Harm  Pt has bipolar disorder w/ h/o suicide attempt including intentional overdose w/ acetaminophen, self harm, and medication  non-compliance.  Plan:  - Continue home Lithium, Seroquel, and Zyprexa  - Ensure ordered medications are administered  - Continue to closely monitor pt's behavior as encephalopathy improves    Hypercalcemia  Ca 11.9 on CMP in ED.  Plan:  - Check ionized Ca, PTH  - Will continue to monitor on morning labs    Thrombocytopenia  Pt has known hx of thrombocytopenia. Plt 147 on CBC in ED.  Plan:  - Continue to monitor on morning labs    Healthcare Maintenance  Pt not UTD on vaccinations or preventative screenings.  Plan:  - F/u w/ PCP at discharge to discuss preventative care      Code Status: Full  VTE Ppx: SCDs  Diet: NPO    Disposition: Pending CTH and improvement in mentation      Yung Calvillo, DO  [facility] Internal Medicine, PGY-1  [facility] Hospital Medicine Team B    [facility] Medicine Hospitalist Pager numbers:   [facility] Hospitalist Medicine Team A (Angela/Vianney): [phone]  [facility] Hospitalist Medicine Team B (Lyn/Edenilson):  [phone]

## 2023-05-18 NOTE — PROGRESS NOTES
VIRTUAL NURSE:  Cued into patient's room.  Permission received per patient to turn camera to view patient.  Introduced as VN that will be working with floor nurse and nursing assistant.  Educated patient on VN's role in patient care and  VIP model.  Informed patient that staff will round on them every 2 hours but to use call light for any other needs they may have; informed of fall risk and fall precautions.  Patient confused due to acute medical condition. Call light within reach; bed siderails up x3.  Telemetry in place. Respirations even and unlabored  Will cont to monitor and intervene as needed. Left message for patient's mother, Anastasia, to call hospital to assist with home medication information.    Labs, notes, orders, and careplan reviewed.        05/18/23 1717   Admission   Initial VN Admission Questions Incomplete  (left message for patient's mother to call hospital)   Shift   Virtual Nurse - Patient Verbalized Approval Of VN Rounding;Camera Use

## 2023-05-19 PROBLEM — G93.40 ACUTE ENCEPHALOPATHY: Status: ACTIVE | Noted: 2023-05-19

## 2023-05-19 PROBLEM — E72.20 HYPERAMMONEMIA: Status: ACTIVE | Noted: 2023-05-19

## 2023-05-19 PROBLEM — E83.52 HYPERCALCEMIA: Status: ACTIVE | Noted: 2023-05-19

## 2023-05-19 PROBLEM — E87.0 HYPERNATREMIA: Status: ACTIVE | Noted: 2023-05-19

## 2023-05-19 LAB
ALBUMIN SERPL BCP-MCNC: 1.8 G/DL (ref 3.5–5.2)
ALBUMIN SERPL BCP-MCNC: 2 G/DL (ref 3.5–5.2)
ALP SERPL-CCNC: 106 U/L (ref 55–135)
ALP SERPL-CCNC: 108 U/L (ref 55–135)
ALT SERPL W/O P-5'-P-CCNC: 23 U/L (ref 10–44)
ALT SERPL W/O P-5'-P-CCNC: 25 U/L (ref 10–44)
ANION GAP SERPL CALC-SCNC: 6 MMOL/L (ref 8–16)
ANION GAP SERPL CALC-SCNC: 7 MMOL/L (ref 8–16)
ANION GAP SERPL CALC-SCNC: 8 MMOL/L (ref 8–16)
AST SERPL-CCNC: 55 U/L (ref 10–40)
AST SERPL-CCNC: 61 U/L (ref 10–40)
BACTERIA #/AREA URNS HPF: ABNORMAL /HPF
BASOPHILS # BLD AUTO: 0.04 K/UL (ref 0–0.2)
BASOPHILS NFR BLD: 0.3 % (ref 0–1.9)
BILIRUB SERPL-MCNC: 2.8 MG/DL (ref 0.1–1)
BILIRUB SERPL-MCNC: 3.8 MG/DL (ref 0.1–1)
BILIRUB UR QL STRIP: NEGATIVE
BUN SERPL-MCNC: 45 MG/DL (ref 6–20)
BUN SERPL-MCNC: 60 MG/DL (ref 6–20)
BUN SERPL-MCNC: 61 MG/DL (ref 6–20)
CALCIUM SERPL-MCNC: 11.1 MG/DL (ref 8.7–10.5)
CALCIUM SERPL-MCNC: 11.8 MG/DL (ref 8.7–10.5)
CALCIUM SERPL-MCNC: 11.9 MG/DL (ref 8.7–10.5)
CHLORIDE SERPL-SCNC: 121 MMOL/L (ref 95–110)
CHLORIDE SERPL-SCNC: 121 MMOL/L (ref 95–110)
CHLORIDE SERPL-SCNC: 126 MMOL/L (ref 95–110)
CLARITY UR: ABNORMAL
CO2 SERPL-SCNC: 20 MMOL/L (ref 23–29)
CO2 SERPL-SCNC: 22 MMOL/L (ref 23–29)
CO2 SERPL-SCNC: 24 MMOL/L (ref 23–29)
COLOR UR: YELLOW
CREAT SERPL-MCNC: 0.8 MG/DL (ref 0.5–1.4)
CREAT SERPL-MCNC: 0.9 MG/DL (ref 0.5–1.4)
CREAT SERPL-MCNC: 0.9 MG/DL (ref 0.5–1.4)
CREAT UR-MCNC: 50.4 MG/DL (ref 15–325)
DIFFERENTIAL METHOD: ABNORMAL
EOSINOPHIL # BLD AUTO: 0.1 K/UL (ref 0–0.5)
EOSINOPHIL NFR BLD: 0.9 % (ref 0–8)
ERYTHROCYTE [DISTWIDTH] IN BLOOD BY AUTOMATED COUNT: 18 % (ref 11.5–14.5)
EST. GFR  (NO RACE VARIABLE): >60 ML/MIN/1.73 M^2
GLUCOSE SERPL-MCNC: 176 MG/DL (ref 70–110)
GLUCOSE SERPL-MCNC: 179 MG/DL (ref 70–110)
GLUCOSE SERPL-MCNC: 213 MG/DL (ref 70–110)
GLUCOSE UR QL STRIP: NEGATIVE
HCT VFR BLD AUTO: 37.2 % (ref 37–48.5)
HGB BLD-MCNC: 10.9 G/DL (ref 12–16)
HGB UR QL STRIP: ABNORMAL
HYALINE CASTS #/AREA URNS LPF: 3 /LPF
IMM GRANULOCYTES # BLD AUTO: 0.1 K/UL (ref 0–0.04)
IMM GRANULOCYTES NFR BLD AUTO: 0.7 % (ref 0–0.5)
KETONES UR QL STRIP: NEGATIVE
LACTATE SERPL-SCNC: 3.1 MMOL/L (ref 0.5–2.2)
LEUKOCYTE ESTERASE UR QL STRIP: ABNORMAL
LYMPHOCYTES # BLD AUTO: 2.1 K/UL (ref 1–4.8)
LYMPHOCYTES NFR BLD: 15.2 % (ref 18–48)
MCH RBC QN AUTO: 32.6 PG (ref 27–31)
MCHC RBC AUTO-ENTMCNC: 29.3 G/DL (ref 32–36)
MCV RBC AUTO: 111 FL (ref 82–98)
MICROSCOPIC COMMENT: ABNORMAL
MONOCYTES # BLD AUTO: 0.9 K/UL (ref 0.3–1)
MONOCYTES NFR BLD: 6.8 % (ref 4–15)
NEUTROPHILS # BLD AUTO: 10.3 K/UL (ref 1.8–7.7)
NEUTROPHILS NFR BLD: 76.1 % (ref 38–73)
NITRITE UR QL STRIP: NEGATIVE
NRBC BLD-RTO: 0 /100 WBC
OSMOLALITY UR: 616 MOSM/KG (ref 50–1200)
PH UR STRIP: 7 [PH] (ref 5–8)
PLATELET # BLD AUTO: 134 K/UL (ref 150–450)
PMV BLD AUTO: 9.5 FL (ref 9.2–12.9)
POCT GLUCOSE: 168 MG/DL (ref 70–110)
POCT GLUCOSE: 207 MG/DL (ref 70–110)
POTASSIUM SERPL-SCNC: 4.6 MMOL/L (ref 3.5–5.1)
POTASSIUM SERPL-SCNC: 4.9 MMOL/L (ref 3.5–5.1)
POTASSIUM SERPL-SCNC: 5 MMOL/L (ref 3.5–5.1)
PROT SERPL-MCNC: 4.6 G/DL (ref 6–8.4)
PROT SERPL-MCNC: 5.2 G/DL (ref 6–8.4)
PROT UR QL STRIP: ABNORMAL
RBC # BLD AUTO: 3.34 M/UL (ref 4–5.4)
RBC #/AREA URNS HPF: 6 /HPF (ref 0–4)
SODIUM SERPL-SCNC: 151 MMOL/L (ref 136–145)
SODIUM SERPL-SCNC: 152 MMOL/L (ref 136–145)
SODIUM SERPL-SCNC: 152 MMOL/L (ref 136–145)
SODIUM UR-SCNC: <20 MMOL/L (ref 20–250)
SP GR UR STRIP: 1.02 (ref 1–1.03)
SQUAMOUS #/AREA URNS HPF: 0 /HPF
UNIDENT CRYS URNS QL MICRO: 3
URN SPEC COLLECT METH UR: ABNORMAL
UROBILINOGEN UR STRIP-ACNC: ABNORMAL EU/DL
UUN UR-MCNC: 1200 MG/DL (ref 140–1050)
WBC # BLD AUTO: 13.51 K/UL (ref 3.9–12.7)
WBC #/AREA URNS HPF: >100 /HPF (ref 0–5)

## 2023-05-19 PROCEDURE — 80053 COMPREHEN METABOLIC PANEL: CPT

## 2023-05-19 PROCEDURE — 87040 BLOOD CULTURE FOR BACTERIA: CPT | Mod: 59 | Performed by: STUDENT IN AN ORGANIZED HEALTH CARE EDUCATION/TRAINING PROGRAM

## 2023-05-19 PROCEDURE — 94761 N-INVAS EAR/PLS OXIMETRY MLT: CPT

## 2023-05-19 PROCEDURE — 80048 BASIC METABOLIC PNL TOTAL CA: CPT | Mod: XB

## 2023-05-19 PROCEDURE — 25000003 PHARM REV CODE 250

## 2023-05-19 PROCEDURE — 87086 URINE CULTURE/COLONY COUNT: CPT

## 2023-05-19 PROCEDURE — 25000003 PHARM REV CODE 250: Performed by: INTERNAL MEDICINE

## 2023-05-19 PROCEDURE — 63600175 PHARM REV CODE 636 W HCPCS

## 2023-05-19 PROCEDURE — 83605 ASSAY OF LACTIC ACID: CPT | Performed by: STUDENT IN AN ORGANIZED HEALTH CARE EDUCATION/TRAINING PROGRAM

## 2023-05-19 PROCEDURE — 84540 ASSAY OF URINE/UREA-N: CPT

## 2023-05-19 PROCEDURE — 21400001 HC TELEMETRY ROOM

## 2023-05-19 PROCEDURE — 36415 COLL VENOUS BLD VENIPUNCTURE: CPT | Performed by: STUDENT IN AN ORGANIZED HEALTH CARE EDUCATION/TRAINING PROGRAM

## 2023-05-19 PROCEDURE — 85025 COMPLETE CBC W/AUTO DIFF WBC: CPT

## 2023-05-19 PROCEDURE — 80053 COMPREHEN METABOLIC PANEL: CPT | Mod: 91 | Performed by: STUDENT IN AN ORGANIZED HEALTH CARE EDUCATION/TRAINING PROGRAM

## 2023-05-19 PROCEDURE — 84300 ASSAY OF URINE SODIUM: CPT | Performed by: STUDENT IN AN ORGANIZED HEALTH CARE EDUCATION/TRAINING PROGRAM

## 2023-05-19 PROCEDURE — 63600175 PHARM REV CODE 636 W HCPCS: Performed by: INTERNAL MEDICINE

## 2023-05-19 PROCEDURE — 99900035 HC TECH TIME PER 15 MIN (STAT)

## 2023-05-19 PROCEDURE — 83935 ASSAY OF URINE OSMOLALITY: CPT

## 2023-05-19 PROCEDURE — 82570 ASSAY OF URINE CREATININE: CPT

## 2023-05-19 PROCEDURE — 81000 URINALYSIS NONAUTO W/SCOPE: CPT

## 2023-05-19 RX ORDER — TALC
6 POWDER (GRAM) TOPICAL NIGHTLY PRN
Status: DISCONTINUED | OUTPATIENT
Start: 2023-05-19 | End: 2023-05-28 | Stop reason: HOSPADM

## 2023-05-19 RX ORDER — LITHIUM CARBONATE 150 MG/1
300 CAPSULE ORAL DAILY
Status: DISCONTINUED | OUTPATIENT
Start: 2023-05-20 | End: 2023-05-19

## 2023-05-19 RX ORDER — LACTULOSE 10 G/15ML
30 SOLUTION ORAL 3 TIMES DAILY
Status: DISCONTINUED | OUTPATIENT
Start: 2023-05-19 | End: 2023-05-24

## 2023-05-19 RX ORDER — LACTULOSE 10 G/15ML
200 SOLUTION ORAL; RECTAL ONCE
Status: COMPLETED | OUTPATIENT
Start: 2023-05-19 | End: 2023-05-19

## 2023-05-19 RX ORDER — OLANZAPINE 2.5 MG/1
5 TABLET ORAL NIGHTLY
Status: DISCONTINUED | OUTPATIENT
Start: 2023-05-19 | End: 2023-05-25

## 2023-05-19 RX ORDER — PROPRANOLOL HYDROCHLORIDE 10 MG/1
20 TABLET ORAL DAILY
Status: DISCONTINUED | OUTPATIENT
Start: 2023-05-20 | End: 2023-05-28 | Stop reason: HOSPADM

## 2023-05-19 RX ORDER — THIAMINE HCL 100 MG
100 TABLET ORAL DAILY
Status: DISCONTINUED | OUTPATIENT
Start: 2023-05-20 | End: 2023-05-25

## 2023-05-19 RX ORDER — LITHIUM CARBONATE 150 MG/1
300 CAPSULE ORAL 2 TIMES DAILY
Status: DISCONTINUED | OUTPATIENT
Start: 2023-05-19 | End: 2023-05-19

## 2023-05-19 RX ORDER — FOLIC ACID 1 MG/1
1 TABLET ORAL DAILY
Status: DISCONTINUED | OUTPATIENT
Start: 2023-05-20 | End: 2023-05-28 | Stop reason: HOSPADM

## 2023-05-19 RX ORDER — DEXTROSE MONOHYDRATE 50 MG/ML
INJECTION, SOLUTION INTRAVENOUS CONTINUOUS
Status: DISCONTINUED | OUTPATIENT
Start: 2023-05-19 | End: 2023-05-21

## 2023-05-19 RX ORDER — QUETIAPINE FUMARATE 100 MG/1
100 TABLET, FILM COATED ORAL NIGHTLY
Status: DISCONTINUED | OUTPATIENT
Start: 2023-05-19 | End: 2023-05-25

## 2023-05-19 RX ORDER — LEVETIRACETAM 100 MG/ML
1000 SOLUTION ORAL 2 TIMES DAILY
Status: DISCONTINUED | OUTPATIENT
Start: 2023-05-19 | End: 2023-05-28 | Stop reason: HOSPADM

## 2023-05-19 RX ORDER — LITHIUM CARBONATE 150 MG/1
300 CAPSULE ORAL 2 TIMES DAILY
Status: DISCONTINUED | OUTPATIENT
Start: 2023-05-19 | End: 2023-05-23

## 2023-05-19 RX ORDER — ZONISAMIDE 100 MG/1
100 CAPSULE ORAL DAILY
Status: DISCONTINUED | OUTPATIENT
Start: 2023-05-20 | End: 2023-05-28 | Stop reason: HOSPADM

## 2023-05-19 RX ADMIN — LACTULOSE 200 G: 10 SOLUTION ORAL at 02:05

## 2023-05-19 RX ADMIN — RIFAXIMIN 550 MG: 550 TABLET ORAL at 02:05

## 2023-05-19 RX ADMIN — OLANZAPINE 5 MG: 2.5 TABLET, FILM COATED ORAL at 09:05

## 2023-05-19 RX ADMIN — QUETIAPINE FUMARATE 100 MG: 100 TABLET ORAL at 09:05

## 2023-05-19 RX ADMIN — FOLIC ACID 1 MG: 1 TABLET ORAL at 02:05

## 2023-05-19 RX ADMIN — ZONISAMIDE 100 MG: 100 CAPSULE ORAL at 02:05

## 2023-05-19 RX ADMIN — CEFEPIME 1 G: 1 INJECTION, POWDER, FOR SOLUTION INTRAMUSCULAR; INTRAVENOUS at 05:05

## 2023-05-19 RX ADMIN — LEVETIRACETAM 1000 MG: 500 TABLET, FILM COATED ORAL at 02:05

## 2023-05-19 RX ADMIN — LEVETIRACETAM 1000 MG: 100 SOLUTION ORAL at 09:05

## 2023-05-19 RX ADMIN — MULTIVITAMIN 15 ML: LIQUID ORAL at 02:05

## 2023-05-19 RX ADMIN — RIFAXIMIN 550 MG: 550 TABLET ORAL at 09:05

## 2023-05-19 RX ADMIN — Medication 100 MG: at 02:05

## 2023-05-19 RX ADMIN — CEFEPIME 1 G: 1 INJECTION, POWDER, FOR SOLUTION INTRAMUSCULAR; INTRAVENOUS at 06:05

## 2023-05-19 RX ADMIN — LACTULOSE 30 G: 20 SOLUTION ORAL at 02:05

## 2023-05-19 RX ADMIN — LACTULOSE 30 G: 20 SOLUTION ORAL at 09:05

## 2023-05-19 RX ADMIN — LITHIUM CARBONATE 300 MG: 150 CAPSULE, GELATIN COATED ORAL at 02:05

## 2023-05-19 RX ADMIN — VANCOMYCIN HYDROCHLORIDE 750 MG: 750 INJECTION, POWDER, LYOPHILIZED, FOR SOLUTION INTRAVENOUS at 04:05

## 2023-05-19 RX ADMIN — DEXTROSE MONOHYDRATE: 50 INJECTION, SOLUTION INTRAVENOUS at 02:05

## 2023-05-19 RX ADMIN — LITHIUM CARBONATE 300 MG: 150 CAPSULE, GELATIN COATED ORAL at 09:05

## 2023-05-19 RX ADMIN — SODIUM CHLORIDE, POTASSIUM CHLORIDE, SODIUM LACTATE AND CALCIUM CHLORIDE 1000 ML: 600; 310; 30; 20 INJECTION, SOLUTION INTRAVENOUS at 07:05

## 2023-05-19 NOTE — PROGRESS NOTES
[facility] Hospital Medicine Progress Note    Primary Team: [facility] Hospitalist Team A  Attending Physician: Sarita Faith MD  Resident:   Intern: Kendall    Subjective:      Ms. Hamilton received one dose of lactulose last night. She is still very altered. Interview limited due to mental status.      Objective:     Last 24 Hour Vital Signs:  BP  Min: 92/51  Max: 143/77  Temp  Av.2 °F (36.2 °C)  Min: 96.2 °F (35.7 °C)  Max: 99.4 °F (37.4 °C)  Pulse  Av.5  Min: 50  Max: 102  Resp  Av.6  Min: 16  Max: 20  SpO2  Av.3 %  Min: 91 %  Max: 100 %  Weight  Av.8 kg (92 lb 2.4 oz)  Min: 41.8 kg (92 lb 2.4 oz)  Max: 41.8 kg (92 lb 2.4 oz)  I/O last 3 completed shifts:  In: 1101.9 [I.V.:867.3; IV Piggyback:234.6]  Out: -     Physical Examination:  Physical Exam  General: thin adult female laying in bed, awakens to sternal rub  HEENT: NC/AT, scleral icterus  Neck: Supple, trachea midline  CV: RRR, normal S1/S2, 2/6 JOE  Resp: CTAB, no wheezing or rhonchi appreciated, normal respiratory effort  Abd: Soft, NT/ND, normoactive BSx4  Extremities: 2+ pulses,left upper extremity edema   Skin: Warm, dry, intact, jaundice, facial spider angiomas  Neuro: somnolent, opens eyes to sternal rub  Psych: unable to assess     Laboratory:  Recent Labs   Lab 23  1140 23  1804 23  0241   WBC 13.00*  --  13.51*   HGB 11.5*  --  10.9*   HCT 37.6  --  37.2   *  --  134*   *  --  111*   RDW 18.4*  --  18.0*   * 153* 151*  152*   K 5.0 5.5* 4.9  5.0   * 123* 121*  121*   CO2 22* 24 22*  24   BUN 56* 70* 60*  61*   CREATININE 1.1 1.1 0.9  0.9   GLU 98 203* 176*  179*   PROT 5.4*  --  5.2*   ALBUMIN 2.1*  --  2.0*   BILITOT 4.8*  --  3.8*   AST 48*  --  55*   ALKPHOS 119  --  106   ALT 23  --  25     Urine culture pending     Other Results:  EKG (my interpretation): NSR    Radiology Data Reviewed:   Pertinent Findings:  CT Head no acute intracranial findings    Current Medications:      Infusions:   lactated ringers 75 mL/hr at 05/18/23 1636        Scheduled:   albuterol sulfate  10 mg Nebulization Once    cefTRIAXone (ROCEPHIN) IVPB  1 g Intravenous Q24H    folic acid  1 mg Oral Daily    insulin regular  10 Units Intravenous Once    lactated ringers  1,000 mL Intravenous Once    lactulose  200 g Rectal Once    lactulose  30 g Oral TID    levETIRAcetam  1,000 mg Oral BID    lithium  300 mg Oral Daily    multivit-min-ferrous gluconate  15 mL Oral Daily    OLANZapine  5 mg Oral QHS    propranoloL  20 mg Oral Daily    QUEtiapine  100 mg Oral QHS    rifAXIMin  550 mg Oral BID    thiamine  100 mg Oral Daily    zonisamide  100 mg Oral Daily        PRN:  [COMPLETED] dextrose 10% **AND** dextrose 10%, melatonin, sodium chloride 0.9%, Pharmacy to dose Vancomycin consult **AND** vancomycin - pharmacy to dose    Assessment:     Marely Hamilton is a 58 y.o.female w/ PMHx of EtOH induced cirrhosis, seizure on AEDs, and bipolar disorder who presented to [facility] on 5/18/2023 with a primary complaint of altered mental status for 1 day due to hepatic encephalopathy.      Plan:     Acute Encephalopathy due to hepatic encephalopathy   EtOh-induced Cirrhosis  Pt found altered by sitter (CNA) earlier on day of admission. She has a hx of hepatic encephalopathy 2/2 medication non-compliance. Pt found to have jaundice and scleral icterus on physical exam. Ammonia 139, Tbili 4.8 in ED.  Plan:  - Likely etiology of hepatic encephalopathy 2/2 medication non-compliance (especially given past hx and PE findings) vs UTI vs hypernatremia  - CTH with no acute abnormality   - Continue lactulose, rifaximine, thiamine, spironolactone, and multivitamin  - Keep NPO pending Speech eval and improvement in mentation  - NGT for lactulose administration, 1x rectal lactulose      UTI  UA in ED w/ 3+ occult blood, 2.0-3.0 urobilinogen, 3+ leukocytes, 34 RBCs, >100 WBC.  Plan:  - Given CTX and Vanc in ED  - Repeat UA w/  cx d/t dirty catch on initial UA  - previous urine culture with >100K CFU Proteus mirabilis   - will treat empirically with ceftriaxone while repeat cultures pending      Hypernatremia  Na 155 on CMP in ED.  Plan:  - Give 1 L LR bolus, followed by LR infusion @ 75 mL/hr  - Goal Na decrease of 6-8 in first 24 hrs  - Will continue to monitor  - repeat LR bolus      Elevated Troponin  Troponin mildly elevated to 0.037 in ED.  Plan:  - Likely d/t demand in setting of hypovolemia  - EKG did not demonstrate new abnormalities  - Troponin peaked, down trend to 0.035  - Continue to routinely monitor vitals     AYESHA, improving   Cr 1.1 on CMP in ED, baseline 0.6.  Plan:  - Likely pre-renal in etiology given hypovolemia  - continue IVF  - Renally dose meds     Seizure Disorder  Pt has hx of seizures and is currently on Keppra 1000 mg BID and Zonisamide 100 mg daily at home. No reported seizure for several years. Per chart review, seizures appear to have taken place in setting of EtOH withdrawal.  Plan:  - Continue home Keppra and Zonisamide  - Continue to monitor  - Recommend Neuro f/u at discharge     Bipolar Disorder  H/o Self Harm  Pt has bipolar disorder w/ h/o suicide attempt including intentional overdose w/ acetaminophen, self harm, and medication non-compliance.  Plan:  - Continue home Lithium, Seroquel, and Zyprexa  - Ensure ordered medications are administered  - Continue to closely monitor pt's behavior as encephalopathy improves  - lithium level low, likely non adherence consistent with collateral obtained from family      Hypercalcemia  Ca 11.9 on CMP in ED.  Plan:  - ionized Ca, PTH elevated   - continue IVF     Thrombocytopenia  Pt has known hx of thrombocytopenia. Plt 147 on CBC in ED.  Plan:  - due to cirrhosis  - no acute conern for bleeding, continue to monitor      Healthcare Maintenance  Pt not UTD on vaccinations or preventative screenings.  Plan:  - F/u w/ PCP at discharge to discuss preventative  care    Dispo: pending improvement in hepatic encephalopathy     Bridget Argueta MD  [facility] Med Peds PGY 1    [facility] Medicine Hospitalist Pager numbers:   [facility] Hospitalist Medicine Team A (Angela/Vianney): [phone]  [facility] Hospitalist Medicine Team B (Lyn/Edenilson):  [phone]

## 2023-05-19 NOTE — PLAN OF CARE
SW attempted to meet with pt at bedside to complete DCA. PT could not be roused, sw will follow back up with pt. Sw will follow.     TAMMY Luna  [phone]     05/19/23 1024   Post-Acute Status   Post-Acute Authorization Placement

## 2023-05-19 NOTE — CONSULTS
[facility] - Telemetry  Adult Nutrition  Consult Note    SUMMARY     Recommendations    Recommendations:   1. Initiate nutrition when medically able - consult RD for recs as needed. 2. Monitor NPO status, weight changes, and labs.   3. RD to follow.    Goals:   Initiate nutrition by RD follow up.  Nutrition Goal Status: new  Communication of RD Recs: other (comment) (Plan of care)    Assessment and Plan    Endocrine  Severe protein-calorie malnutrition  Malnutrition Type:  Context: Acute  Level: Severe    Related to (etiology):   Hepatic encephalopathy  Altered mental status    Signs and Symptoms (as evidenced by):   Wt loss of 28.55% x 3 months  Severe buccal, orbital, and temporal depletion    Malnutrition Characteristic Summary:  Wt loss of 28.55% x 3 months  Severe buccal, orbital, and temporal depletion    Interventions/Recommendations (treatment strategy):  1. Initiate nutrition when medically able - consult RD for recs as needed.   2. Monitor NPO status, weight changes, and labs.   3. RD to follow.    Nutrition Diagnosis Status:   New         Malnutrition Assessment    Orbital Region (Subcutaneous Fat Loss): severe depletion   Mormonism Region (Muscle Loss): severe depletion       Reason for Assessment    Reason For Assessment: identified at risk by screening criteria, consult  Diagnosis: liver disease, infection/sepsis  Relevant Medical History: Addiction to Drug, ETOH Abuse, Anemia, Anxiety, Behavioral Disorder, Bipolar Disorder, Cirrhosis, Depression, Hepatic Encephalopathy, Suicide Attempt, Psychosis, Self-Harming Behavior, Jeana  Interdisciplinary Rounds: did not attend  General Information Comments: Pt admitted 5/18/2023 with hepatic encephalopathy, UTI, AMS. Pt is currently NPO - has been NPO since admit. Current wt 41.8 kg - loss of 16.7 kg x 3 months - pt previously on diuretic. BMI 16.85. LBM 5/6/2023. Francisco 16 - no altered skin integrity noted. NFPE completed 5/19/2023 - severe buccal, temporal, and  orbital depletion. Pt sleeping at time of visit today.  Nutrition Discharge Planning: TBD    Patient Active Problem List   Diagnosis    Cirrhosis, Laennec's    Thrombocytopenia    History of seizure    Glaucoma    hx of intentional Tylenol overdose    Alcohol abuse, in remission    AYESHA (acute kidney injury)    Macrocytic anemia    Chronic liver failure    Severe protein-calorie malnutrition    Hepatic encephalopathy    Bipolar 1 disorder, depressed, severe    Elevated LFTs    Bipolar 1 disorder    Bipolar disorder, manic    Urinary tract infection without hematuria    SVT (supraventricular tachycardia)    Alcoholic cirrhosis    Hepatic cirrhosis    Metabolic acidosis    Hyperammonemia    Hypernatremia    Hypercalcemia    Acute encephalopathy     Past Medical History:   Diagnosis Date    Addiction to drug     Alcohol abuse     Alcohol abuse, in remission 6/15/2015    14.5 weeks ago; AA weekly    Anemia     Anxiety 6/15/2015    Behavioral problem     Bipolar disorder     Bipolar disorder in remission 6/15/2015    Cirrhosis, Laennec's 6/15/2015    Depression     Encounter for blood transfusion     Epistaxis 6/15/2015    Fatigue     Glaucoma     Hematuria     Hepatic encephalopathy 6/15/2015    Hepatic enlargement     History of psychiatric care     History of psychiatric hospitalization     History of seizure 6/15/2015    1    hx of intentional Tylenol overdose 6/15/2015    2005- situational and hx of bipolar    Hx of psychiatric care     Macrocytic anemia 9/18/2015    6 units PRBC since June 2015    Jeana     Osteoarthritis     Other ascites 6/15/2015    Psychiatric exam requested by authority     Psychiatric problem     Psychosis 9/26/2019    Renal disorder     Seizures     Self-harming behavior     Suicide attempt     Therapy     Thrombocytopenia 6/15/2015       Nutrition Risk Screen    Nutrition Risk Screen: other (see comments)    Nutrition/Diet History    Food Preferences: No cultural or Samaritan preferences  "identified.  Spiritual, Cultural Beliefs, Hinduism Practices, Values that Affect Care: no  Food Allergies: NKFA  Factors Affecting Nutritional Intake: impaired cognitive status/motor control, constipation, depression, excessive alcohol intake, NPO    Anthropometrics    Temp: 96.2 °F (35.7 °C)  Height Method: Stated (per prior medical record)  Height: 5' 2" (157.5 cm)  Height (inches): 62 in  Weight Method: Bed Scale (per [facility])  Weight: 41.8 kg (92 lb 2.4 oz)  Weight (lb): 92.15 lb  Ideal Body Weight (IBW), Female: 110 lb  % Ideal Body Weight, Female (lb): 83.77 %  BMI (Calculated): 16.9  BMI Grade: 16 - 16.9 protein-energy malnutrition grade II  Weight Loss: unintentional  Usual Body Weight (UBW), k.5 kg  % Usual Body Weight: 71.6  % Weight Change From Usual Weight: -28.55 %     Wt Readings from Last 10 Encounters:   23 41.8 kg (92 lb 2.4 oz)   23 45.3 kg (99 lb 13.9 oz)   03/10/23 46.8 kg (103 lb 2.8 oz)   23 47.1 kg (103 lb 13.4 oz)   23 58.5 kg (129 lb)   23 58.9 kg (129 lb 13.6 oz)   21 58.9 kg (129 lb 13.6 oz)   21 59 kg (130 lb)   21 53.1 kg (117 lb)   20 54.3 kg (119 lb 11.4 oz)       Lab/Procedures/Meds    Pertinent Labs Reviewed: reviewed  Pertinent Labs Comments: Sodium 151, BUN 61, Glucose 179, Albumin 2.0, Vitamin K 6.88    Lab Results   Component Value Date/Time     (H) 2023 02:41 AM     (H) 2023 02:41 AM    K 4.9 2023 02:41 AM    K 5.0 2023 02:41 AM    EGFRNORACEVR >60 2023 02:41 AM    EGFRNORACEVR >60 2023 02:41 AM    CALCIUM 11.8 (H) 2023 02:41 AM    CALCIUM 11.9 (H) 2023 02:41 AM    PHOS 4.2 2023 03:27 PM    MG 2.7 (H) 2023 03:27 PM    ALBUMIN 2.0 (L) 2023 02:41 AM    TRIG 39 2020 08:01 AM    HGBA1C 4.1 2021 07:29 AM     BMP  Lab Results   Component Value Date     (H) 2023     (H) 2023    K 4.9 2023    K 5.0 2023 "     (H) 05/19/2023     (H) 05/19/2023    CO2 22 (L) 05/19/2023    CO2 24 05/19/2023    BUN 60 (H) 05/19/2023    BUN 61 (H) 05/19/2023    CREATININE 0.9 05/19/2023    CREATININE 0.9 05/19/2023    CALCIUM 11.8 (H) 05/19/2023    CALCIUM 11.9 (H) 05/19/2023    ANIONGAP 8 05/19/2023    ANIONGAP 7 (L) 05/19/2023    EGFRNORACEVR >60 05/19/2023    EGFRNORACEVR >60 05/19/2023     Lab Results   Component Value Date    HGBA1C 4.1 01/22/2021     POCT Glucose   Date Value Ref Range Status   05/19/2023 168 (H) 70 - 110 mg/dL Final   05/19/2023 207 (H) 70 - 110 mg/dL Final   05/18/2023 185 (H) 70 - 110 mg/dL Final       Recent Labs   Lab 05/19/23  0241   WBC 13.51*   RBC 3.34*   HGB 10.9*   HCT 37.2   *   *   MCH 32.6*   MCHC 29.3*       Pertinent Medications Reviewed: reviewed  Pertinent Medications Comments: Cefepime, Folic Acid, Insulin Regular 10 units, Lactated Ringers, Lactulose, Multivitamin, Rifaximin, Thiamine, Vancomycin    Scheduled Meds:   albuterol sulfate  10 mg Nebulization Once    ceFEPime (MAXIPIME) IVPB  1 g Intravenous Q8H    folic acid  1 mg Oral Daily    insulin regular  10 Units Intravenous Once    lactulose  30 g Oral TID    levETIRAcetam  1,000 mg Oral BID    lithium  300 mg Oral Daily    multivit-min-ferrous gluconate  15 mL Oral Daily    OLANZapine  5 mg Oral QHS    propranoloL  20 mg Oral Daily    QUEtiapine  100 mg Oral QHS    rifAXIMin  550 mg Oral BID    thiamine  100 mg Oral Daily    zonisamide  100 mg Oral Daily     Continuous Infusions:   dextrose 5 % (D5W) 100 mL/hr at 05/19/23 1425     PRN Meds:.[COMPLETED] dextrose 10% **AND** dextrose 10%, melatonin, sodium chloride 0.9%, Pharmacy to dose Vancomycin consult **AND** vancomycin - pharmacy to dose    Estimated/Assessed Needs    Weight Used For Calorie Calculations: 41.8 kg (92 lb 2.4 oz)  Energy Calorie Requirements (kcal): 1881 (45 kcal/kg)  Energy Need Method: Kcal/kg  Protein Requirements: 71 g (1.7 g/kg)  Weight Used  For Protein Calculations: 41.8 kg (92 lb 2.4 oz)  Fluid Requirements (mL): 1881  Estimated Fluid Requirement Method: RDA Method  RDA Method (mL): 1881       Nutrition Prescription Ordered    Current Diet Order: NPO    Evaluation of Received Nutrient/Fluid Intake    I/O: 0/350    Intake/Output Summary (Last 24 hours) at 5/19/2023 1525  Last data filed at 5/19/2023 1406  Gross per 24 hour   Intake 1101.86 ml   Output 620 ml   Net 481.86 ml       Energy Calories Required: not meeting needs  Protein Required: not meeting needs  Fluid Required: not meeting needs  % Intake of Estimated Energy Needs: 0 - 25 %  % Meal Intake: NPO    Nutrition Risk    Level of Risk/Frequency of Follow-up:  (2x/week)       Monitor and Evaluation    Food and Nutrient Intake: other (specify) (NPO)  Food and Nutrient Adminstration: other (specify) (NPO)  Anthropometric Measurements: weight, weight change, body mass index  Biochemical Data, Medical Tests and Procedures: electrolyte and renal panel, gastrointestinal profile, glucose/endocrine profile  Nutrition-Focused Physical Findings: overall appearance       Nutrition Follow-Up    RD Follow-up?: Yes

## 2023-05-19 NOTE — ASSESSMENT & PLAN NOTE
Malnutrition Type:  Context: Acute  Level: Severe    Related to (etiology):   Hepatic encephalopathy  Altered mental status    Signs and Symptoms (as evidenced by):   Wt loss of 28.55% x 3 months  Severe buccal, orbital, and temporal depletion    Malnutrition Characteristic Summary:  Wt loss of 28.55% x 3 months  Severe buccal, orbital, and temporal depletion    Interventions/Recommendations (treatment strategy):  1. Initiate nutrition when medically able - consult RD for recs as needed.   2. Monitor NPO status, weight changes, and labs.   3. RD to follow.    Nutrition Diagnosis Status:   New

## 2023-05-19 NOTE — NURSING
RAPID RESPONSE NURSE PROACTIVE ROUNDING NOTE       Time of Visit: 730    Admit Date: 2023  LOS: 1  Code Status: Full Code   Date of Visit: 2023  : 1965  Age: 58 y.o.  Sex: female  Race: White  Bed: K485/K485 A:   MRN: [id number]  Was the patient discharged from an ICU this admission? No   Was the patient discharged from a PACU within last 24 hours? No   Did the patient receive conscious sedation/general anesthesia in last 24 hours? No   Was the patient in the ED within the past 24 hours? Yes   Was the patient on NIPPV within the past 24 hours? No   Attending Physician: Sarita Faith MD  Primary Service: Internal Medicine   Time spent at the bedside: 15 -30 min    SITUATION    Notified by bedside RN via phone call  Reason for alert: Increased lethargy     Diagnosis: <principal problem not specified>   has a past medical history of Addiction to drug, Alcohol abuse, Alcohol abuse, in remission, Anemia, Anxiety, Behavioral problem, Bipolar disorder, Bipolar disorder in remission, Cirrhosis, Laennec's, Depression, Encounter for blood transfusion, Epistaxis, Fatigue, Glaucoma, Hematuria, Hepatic encephalopathy, Hepatic enlargement, History of psychiatric care, History of psychiatric hospitalization, History of seizure, hx of intentional Tylenol overdose, psychiatric care, Macrocytic anemia, Jeana, Osteoarthritis, Other ascites, Psychiatric exam requested by authority, Psychiatric problem, Psychosis, Renal disorder, Seizures, Self-harming behavior, Suicide attempt, Therapy, and Thrombocytopenia.    Last Vitals:  Temp: 96.2 °F (35.7 °C) (748)  Pulse: 56 (748)  Resp: 18 (748)  BP: 97/49 (748)  SpO2: 91 % (748)    24 Hour Vitals Range:  Temp:  [96.2 °F (35.7 °C)-99.4 °F (37.4 °C)]   Pulse:  []   Resp:  [16-20]   BP: ()/(49-82)   SpO2:  [91 %-100 %]     Clinical Issues: Neuro    INTERVENTIONS/RECOMMENDATIONS  Patient very lethargic, arousing to vigorous painful  stimuli, very lethargic. Moaning with painful stimuli, no commands. Bedside LPN noted this change from yesterday. LPN states patient was awake and talking at shift change yesterday. Patient has received IVF with poor urine output, bladder scan performed with >211 noted on scan. Patient unable to swallow pills. Spoke with MD concerning NGT, lactulose enemas, in and out, and possible lasix. Swelling with weeping noted to left arm from an IV that infiltrated.       Discussed plan of care with LISETH Thrasher and charge RN Jolanta    PROVIDER ESCALATION    Physician escalation: Yes    Orders received and case discussed with Dr. Faith resident Robina .    Disposition:Remain in room 485    FOLLOW UP    Call back the Rapid Response Nurse, Yolanda Piña at [phone] for additional questions or concerns.

## 2023-05-19 NOTE — PLAN OF CARE
Problem: Infection  Goal: Absence of Infection Signs and Symptoms  5/19/2023 0246 by Vinnie Christie RN  Outcome: Ongoing, Progressing  5/19/2023 0147 by Vinnie Christie RN  Outcome: Ongoing, Progressing     Problem: Fall Injury Risk  Goal: Absence of Fall and Fall-Related Injury  5/19/2023 0246 by Vinnie Christie RN  Outcome: Ongoing, Progressing  5/19/2023 0147 by Vinnie Christie RN  Outcome: Ongoing, Progressing     Problem: Fluid and Electrolyte Imbalance (Liver Failure)  Goal: Fluid and Electrolyte Balance  Outcome: Ongoing, Progressing

## 2023-05-19 NOTE — PLAN OF CARE
Problem: Infection  Goal: Absence of Infection Signs and Symptoms  Outcome: Ongoing, Progressing     Problem: Fall Injury Risk  Goal: Absence of Fall and Fall-Related Injury  Outcome: Ongoing, Progressing     Problem: Fluid and Electrolyte Imbalance (Liver Failure)  Goal: Fluid and Electrolyte Balance  Outcome: Ongoing, Progressing

## 2023-05-19 NOTE — NURSING
PROACTIVE ROUNDING NOTE       Follow up with bedside RN, awaiting NGT confirmation. Spoke with admitting team, MD to confirm placement of NGT, follow up with RN about AM meds and lactulose enema. Able to assist if needed.       FOLLOW UP    Call back the Rapid Response Nurse, Yolanda Piña at [phone] for additional questions or concerns.

## 2023-05-19 NOTE — PLAN OF CARE
Recommendations    Recommendations:   1. Initiate nutrition when medically able - consult RD for recs as needed. 2. Monitor NPO status, weight changes, and labs.   3. RD to follow.    Goals:   Initiate nutrition by RD follow up.  Nutrition Goal Status: new  Communication of RD Recs: other (comment) (Plan of care)

## 2023-05-19 NOTE — PLAN OF CARE
SOCIAL WORK DISCHARGE PLANNING ASSESSMENT     completed discharge planning assessment with pt's sister Zaria ([phone]) via telephone. Zaria was easily engaged and education on the role of  was provided. Zaria reported that pt lives with her mother Anastasia. Zaria stated that pt's mother is currently sick in the hospital and might not make it. Zaria reported that if pt's mother does not make it then she is agreeable for pt to move to [facility] and live with her. Zaria reported that pt has the following DME: rolling walker and bsc. Zaria stated that pt has a private care take 7 days a week. Zaria stated that when pt takes her medication she is very independent and drives herself to doctor appointments. Zaria reported that pt's PCP is Dr. Viktor Ross and her psychiatrist is Dr. Alfredito Coates. Zaria stated she is worried that pt will be discharged too soon. Zaria was encouraged to call with any questions or concerns. Zaria verbalized understanding.       Patient Active Problem List   Diagnosis    Cirrhosis, Laennec's    Thrombocytopenia    History of seizure    Glaucoma    hx of intentional Tylenol overdose    Alcohol abuse, in remission    AYESHA (acute kidney injury)    Macrocytic anemia    Chronic liver failure    Malnutrition    Hepatic encephalopathy    Bipolar 1 disorder, depressed, severe    Elevated LFTs    Bipolar 1 disorder    Bipolar disorder, manic    Urinary tract infection without hematuria    SVT (supraventricular tachycardia)    Alcoholic cirrhosis    Hepatic cirrhosis    Metabolic acidosis    Hyperammonemia    Hypernatremia    Hypercalcemia    Acute encephalopathy        05/19/23 1438   Discharge Assessment   Assessment Type Discharge Planning Assessment   Confirmed/corrected address, phone number and insurance Yes   Confirmed Demographics Correct on Facesheet   Source of Information family  (pt's sister Zaria [phone])   Communicated BRAYAN with  patient/caregiver Date not available/Unable to determine   People in Home parent(s)   Facility Arrived From: home   Do you expect to return to your current living situation? Yes   Do you have help at home or someone to help you manage your care at home? Yes   Who are your caregiver(s) and their phone number(s)? pt's sister Zaria [phone]   Prior to hospitilization cognitive status: Not Oriented to Person;Not Oriented to Place;Not Oriented to Time   Current cognitive status: Not Oriented to Person;Not Oriented to Place;Not Oriented to Time   Walking or Climbing Stairs ambulation difficulty, requires equipment   Dressing/Bathing bathing difficulty, requires equipment   Home Accessibility wheelchair accessible   Home Layout Able to live on 1st floor   Equipment Currently Used at Home walker, rolling;bedside commode   Readmission within 30 days? No   Patient currently being followed by outpatient case management? No   Do you currently have service(s) that help you manage your care at home? Yes  (Pt has a private caregiver 7 days a week)   Is the pt/caregiver preference to resume services with current agency Yes   Do you take prescription medications? Yes   Do you have prescription coverage? Yes   Coverage [employer]   Do you have any problems affording any of your prescribed medications? No   Is the patient taking medications as prescribed? no   Who is going to help you get home at discharge? pt's sister Zaria [phone]   How do you get to doctors appointments? car, drives self   Are you on dialysis? No   Do you take coumadin? No   Discharge Plan A Home with family   DME Needed Upon Discharge    (TBD)   Discharge Plan discussed with: Sibling   Name(s) and Number(s) pt's sister Zaria [phone]   Physical Activity   On average, how many days per week do you engage in moderate to strenuous exercise (like a brisk walk)? Patient refu   On average, how many minutes do you engage in exercise at  this level? Patient refu   Financial Resource Strain   How hard is it for you to pay for the very basics like food, housing, medical care, and heating? Not hard   Housing Stability   In the last 12 months, was there a time when you were not able to pay the mortgage or rent on time? N   In the last 12 months, how many places have you lived? 1   In the last 12 months, was there a time when you did not have a steady place to sleep or slept in a shelter (including now)? N   Transportation Needs   In the past 12 months, has lack of transportation kept you from medical appointments or from getting medications? no   In the past 12 months, has lack of transportation kept you from meetings, work, or from getting things needed for daily living? No   Food Insecurity   Within the past 12 months, you worried that your food would run out before you got the money to buy more. Never true   Within the past 12 months, the food you bought just didn't last and you didn't have money to get more. Never true   Stress   Do you feel stress - tense, restless, nervous, or anxious, or unable to sleep at night because your mind is troubled all the time - these days? To some exte   Social Connections   In a typical week, how many times do you talk on the phone with family, friends, or neighbors? More than 3   How often do you get together with friends or relatives? More than 3   How often do you attend Cheondoism or Anglican services? Patient refu   Do you belong to any clubs or organizations such as Cheondoism groups, unions, fraternal or athletic groups, or school groups? Patient refu   How often do you attend meetings of the clubs or organizations you belong to? Patient refu   Are you , , , , never , or living with a partner? Never marrie   Alcohol Use   Q1: How often do you have a drink containing alcohol? Patient refu   Q2: How many drinks containing alcohol do you have on a typical day when you are drinking?  Patient refu   Q3: How often do you have six or more drinks on one occasion? Patient refu   OTHER   Name(s) of People in Home pt's mother Anastasia

## 2023-05-19 NOTE — PROGRESS NOTES
Pharmacokinetic Assessment Follow Up: IV Vancomycin    Vancomycin serum concentration assessment(s):        Vancomycin Regimen Plan:    Change regimen to Vancomycin 750 mg IV every 24 hours with next serum trough concentration measured at 1600 prior to 3rd dose on 5/20    Drug levels (last 3 results):  No results for input(s): VANCOMYCINRA, VANCORANDOM, VANCOMYCINPE, VANCOPEAK, VANCOMYCINTR, VANCOTROUGH in the last 72 hours.    Pharmacy will continue to follow and monitor vancomycin.    Please contact pharmacy at extension [phone] for questions regarding this assessment.    Thank you for the consult,   Vinicius Son       Patient brief summary:  Marely Hamilton is a 58 y.o. female initiated on antimicrobial therapy with IV Vancomycin for treatment of urinary tract infection    The patient's current regimen is vancomycin 750mg IV q24h    Drug Allergies:   Review of patient's allergies indicates:   Allergen Reactions    Sulfa (sulfonamide antibiotics) Rash    Codeine Nausea And Vomiting       Actual Body Weight:       Renal Function:   Estimated Creatinine Clearance: 45.5 mL/min (based on SCr of 0.9 mg/dL).,     Dialysis Method (if applicable):    CBC (last 72 hours):  Recent Labs   Lab Result Units 05/18/23  1140 05/19/23  0241   WBC K/uL 13.00* 13.51*   Hemoglobin g/dL 11.5* 10.9*   Hematocrit % 37.6 37.2   Platelets K/uL 147* 134*   Gran % % 66.8 76.1*   Lymph % % 20.2 15.2*   Mono % % 11.9 6.8   Eosinophil % % 0.5 0.9   Basophil % % 0.2 0.3   Differential Method  Automated Automated       Metabolic Panel (last 72 hours):  Recent Labs   Lab Result Units 05/18/23  1127 05/18/23  1140 05/18/23  1527 05/18/23  1804 05/19/23  0241 05/19/23  1401 05/19/23  1402   Sodium mmol/L  --  155*  --  153* 151*  152*  --   --    Sodium, Urine mmol/L  --   --   --   --   --   --  <20*   Potassium mmol/L  --  5.0  --  5.5* 4.9  5.0  --   --    Chloride mmol/L  --  126*  --  123* 121*  121*  --   --    CO2 mmol/L  --  22*  --  24  22*  24  --   --    Glucose mg/dL  --  98  --  203* 176*  179*  --   --    Glucose, UA  Negative  --   --   --   --  Negative  --    BUN mg/dL  --  56*  --  70* 60*  61*  --   --    Creatinine mg/dL  --  1.1  --  1.1 0.9  0.9  --   --    Creatinine, Urine mg/dL 89.9  --   --   --   --   --  50.4   Albumin g/dL  --  2.1*  --   --  2.0*  --   --    Total Bilirubin mg/dL  --  4.8*  --   --  3.8*  --   --    Alkaline Phosphatase U/L  --  119  --   --  106  --   --    AST U/L  --  48*  --   --  55*  --   --    ALT U/L  --  23  --   --  25  --   --    Magnesium mg/dL  --   --  2.7*  --   --   --   --    Phosphorus mg/dL  --   --  4.2  --   --   --   --        Vancomycin Administrations:  vancomycin given in the last 96 hours                     vancomycin 750 mg in dextrose 5 % (D5W) 250 mL IVPB (Vial-Mate) (mg) 750 mg New Bag 05/18/23 1638                    Microbiologic Results:  Microbiology Results (last 7 days)       Procedure Component Value Units Date/Time    Blood culture [[id number]] Collected: 05/19/23 1420    Order Status: Sent Specimen: Blood Updated: 05/19/23 1420    Narrative:      Collection has been rescheduled by [email or web address] at 05/19/2023 08:03 Reason:   Unable to collect.. patient is swollen and bruised   Collection has been rescheduled by [email or web address] at 05/19/2023 12:16 Reason:   Patient unavailable in ultrasound   Collection has been rescheduled by [email or web address] at 05/19/2023 08:03 Reason:   Unable to collect.. patient is swollen and bruised   Collection has been rescheduled by [email or web address] at 05/19/2023 12:16 Reason:   Patient unavailable in ultrasound     Blood culture [[id number]] Collected: 05/19/23 1420    Order Status: Sent Specimen: Blood Updated: 05/19/23 1420    Narrative:      Collection has been rescheduled by [email or web address] at 05/19/2023 08:03 Reason:   Unable to collect.. patient is swollen and bruised   Collection has been rescheduled by [email or web address] at 05/19/2023 12:16 Reason:   Patient unavailable in ultrasound   Collection has  been rescheduled by [email or web address] at 05/19/2023 08:03 Reason:   Unable to collect.. patient is swollen and bruised   Collection has been rescheduled by [email or web address] at 05/19/2023 12:16 Reason:   Patient unavailable in ultrasound     Urine culture [[id number]] Collected: 05/19/23 1402    Order Status: No result Specimen: Urine Updated: 05/19/23 1419    Urine culture [[id number]] Collected: 05/18/23 1127    Order Status: No result Specimen: Urine Updated: 05/18/23 1202

## 2023-05-19 NOTE — PLAN OF CARE
Called patient's sister Zaria to update home medication list. Sister unable to provide list at this time.

## 2023-05-20 LAB
ALBUMIN SERPL BCP-MCNC: 1.9 G/DL (ref 3.5–5.2)
ALBUMIN SERPL BCP-MCNC: 2 G/DL (ref 3.5–5.2)
ALP SERPL-CCNC: 109 U/L (ref 55–135)
ALP SERPL-CCNC: 126 U/L (ref 55–135)
ALT SERPL W/O P-5'-P-CCNC: 32 U/L (ref 10–44)
ALT SERPL W/O P-5'-P-CCNC: 43 U/L (ref 10–44)
ANION GAP SERPL CALC-SCNC: 3 MMOL/L (ref 8–16)
ANION GAP SERPL CALC-SCNC: 6 MMOL/L (ref 8–16)
ANISOCYTOSIS BLD QL SMEAR: SLIGHT
AST SERPL-CCNC: 55 U/L (ref 10–40)
AST SERPL-CCNC: 99 U/L (ref 10–40)
BACTERIA UR CULT: ABNORMAL
BACTERIA UR CULT: NO GROWTH
BASO STIPL BLD QL SMEAR: ABNORMAL
BASOPHILS # BLD AUTO: 0.01 K/UL (ref 0–0.2)
BASOPHILS NFR BLD: 0.1 % (ref 0–1.9)
BILIRUB SERPL-MCNC: 2.7 MG/DL (ref 0.1–1)
BILIRUB SERPL-MCNC: 2.7 MG/DL (ref 0.1–1)
BUN SERPL-MCNC: 23 MG/DL (ref 6–20)
BUN SERPL-MCNC: 29 MG/DL (ref 6–20)
BURR CELLS BLD QL SMEAR: ABNORMAL
CALCIUM SERPL-MCNC: 11.3 MG/DL (ref 8.7–10.5)
CALCIUM SERPL-MCNC: 11.5 MG/DL (ref 8.7–10.5)
CHLORIDE SERPL-SCNC: 121 MMOL/L (ref 95–110)
CHLORIDE SERPL-SCNC: 121 MMOL/L (ref 95–110)
CO2 SERPL-SCNC: 18 MMOL/L (ref 23–29)
CO2 SERPL-SCNC: 23 MMOL/L (ref 23–29)
CREAT SERPL-MCNC: 0.8 MG/DL (ref 0.5–1.4)
CREAT SERPL-MCNC: 0.9 MG/DL (ref 0.5–1.4)
DACRYOCYTES BLD QL SMEAR: ABNORMAL
DIFFERENTIAL METHOD: ABNORMAL
EOSINOPHIL # BLD AUTO: 0.3 K/UL (ref 0–0.5)
EOSINOPHIL NFR BLD: 3.6 % (ref 0–8)
ERYTHROCYTE [DISTWIDTH] IN BLOOD BY AUTOMATED COUNT: 17.2 % (ref 11.5–14.5)
EST. GFR  (NO RACE VARIABLE): >60 ML/MIN/1.73 M^2
EST. GFR  (NO RACE VARIABLE): >60 ML/MIN/1.73 M^2
GLUCOSE SERPL-MCNC: 151 MG/DL (ref 70–110)
GLUCOSE SERPL-MCNC: 183 MG/DL (ref 70–110)
HCT VFR BLD AUTO: 31.6 % (ref 37–48.5)
HGB BLD-MCNC: 9.7 G/DL (ref 12–16)
HYPOCHROMIA BLD QL SMEAR: ABNORMAL
IMM GRANULOCYTES # BLD AUTO: 0.04 K/UL (ref 0–0.04)
IMM GRANULOCYTES NFR BLD AUTO: 0.4 % (ref 0–0.5)
LACTATE SERPL-SCNC: 2.6 MMOL/L (ref 0.5–2.2)
LYMPHOCYTES # BLD AUTO: 1.6 K/UL (ref 1–4.8)
LYMPHOCYTES NFR BLD: 16.9 % (ref 18–48)
MCH RBC QN AUTO: 32.9 PG (ref 27–31)
MCHC RBC AUTO-ENTMCNC: 30.7 G/DL (ref 32–36)
MCV RBC AUTO: 107 FL (ref 82–98)
MONOCYTES # BLD AUTO: 0.8 K/UL (ref 0.3–1)
MONOCYTES NFR BLD: 8.9 % (ref 4–15)
NEUTROPHILS # BLD AUTO: 6.4 K/UL (ref 1.8–7.7)
NEUTROPHILS NFR BLD: 70.1 % (ref 38–73)
NRBC BLD-RTO: 0 /100 WBC
OVALOCYTES BLD QL SMEAR: ABNORMAL
PLATELET # BLD AUTO: 93 K/UL (ref 150–450)
PLATELET BLD QL SMEAR: ABNORMAL
PMV BLD AUTO: 9.4 FL (ref 9.2–12.9)
POCT GLUCOSE: 130 MG/DL (ref 70–110)
POCT GLUCOSE: 182 MG/DL (ref 70–110)
POCT GLUCOSE: 185 MG/DL (ref 70–110)
POCT GLUCOSE: 201 MG/DL (ref 70–110)
POCT GLUCOSE: 217 MG/DL (ref 70–110)
POIKILOCYTOSIS BLD QL SMEAR: SLIGHT
POLYCHROMASIA BLD QL SMEAR: ABNORMAL
POTASSIUM SERPL-SCNC: 3.3 MMOL/L (ref 3.5–5.1)
POTASSIUM SERPL-SCNC: 3.9 MMOL/L (ref 3.5–5.1)
PROT SERPL-MCNC: 4.7 G/DL (ref 6–8.4)
PROT SERPL-MCNC: 5.2 G/DL (ref 6–8.4)
RBC # BLD AUTO: 2.95 M/UL (ref 4–5.4)
SODIUM SERPL-SCNC: 145 MMOL/L (ref 136–145)
SODIUM SERPL-SCNC: 147 MMOL/L (ref 136–145)
WBC # BLD AUTO: 9.18 K/UL (ref 3.9–12.7)

## 2023-05-20 PROCEDURE — 80053 COMPREHEN METABOLIC PANEL: CPT | Mod: 91 | Performed by: STUDENT IN AN ORGANIZED HEALTH CARE EDUCATION/TRAINING PROGRAM

## 2023-05-20 PROCEDURE — 36415 COLL VENOUS BLD VENIPUNCTURE: CPT

## 2023-05-20 PROCEDURE — 83605 ASSAY OF LACTIC ACID: CPT | Performed by: STUDENT IN AN ORGANIZED HEALTH CARE EDUCATION/TRAINING PROGRAM

## 2023-05-20 PROCEDURE — 94761 N-INVAS EAR/PLS OXIMETRY MLT: CPT

## 2023-05-20 PROCEDURE — 27000221 HC OXYGEN, UP TO 24 HOURS

## 2023-05-20 PROCEDURE — 25000003 PHARM REV CODE 250: Performed by: STUDENT IN AN ORGANIZED HEALTH CARE EDUCATION/TRAINING PROGRAM

## 2023-05-20 PROCEDURE — 21400001 HC TELEMETRY ROOM

## 2023-05-20 PROCEDURE — 99900035 HC TECH TIME PER 15 MIN (STAT)

## 2023-05-20 PROCEDURE — 25000003 PHARM REV CODE 250

## 2023-05-20 PROCEDURE — 36415 COLL VENOUS BLD VENIPUNCTURE: CPT | Performed by: STUDENT IN AN ORGANIZED HEALTH CARE EDUCATION/TRAINING PROGRAM

## 2023-05-20 PROCEDURE — 25000003 PHARM REV CODE 250: Performed by: INTERNAL MEDICINE

## 2023-05-20 PROCEDURE — 85025 COMPLETE CBC W/AUTO DIFF WBC: CPT

## 2023-05-20 PROCEDURE — 63600175 PHARM REV CODE 636 W HCPCS

## 2023-05-20 RX ADMIN — DEXTROSE MONOHYDRATE: 50 INJECTION, SOLUTION INTRAVENOUS at 09:05

## 2023-05-20 RX ADMIN — PROPRANOLOL HYDROCHLORIDE 20 MG: 10 TABLET ORAL at 09:05

## 2023-05-20 RX ADMIN — FOLIC ACID 1 MG: 1 TABLET ORAL at 09:05

## 2023-05-20 RX ADMIN — CEFEPIME 1 G: 1 INJECTION, POWDER, FOR SOLUTION INTRAMUSCULAR; INTRAVENOUS at 09:05

## 2023-05-20 RX ADMIN — LITHIUM CARBONATE 300 MG: 150 CAPSULE, GELATIN COATED ORAL at 10:05

## 2023-05-20 RX ADMIN — POTASSIUM BICARBONATE 40 MEQ: 391 TABLET, EFFERVESCENT ORAL at 11:05

## 2023-05-20 RX ADMIN — LEVETIRACETAM 1000 MG: 100 SOLUTION ORAL at 09:05

## 2023-05-20 RX ADMIN — ZONISAMIDE 100 MG: 100 CAPSULE ORAL at 09:05

## 2023-05-20 RX ADMIN — LITHIUM CARBONATE 300 MG: 150 CAPSULE, GELATIN COATED ORAL at 09:05

## 2023-05-20 RX ADMIN — MULTIVITAMIN 15 ML: LIQUID ORAL at 09:05

## 2023-05-20 RX ADMIN — LACTULOSE 30 G: 20 SOLUTION ORAL at 09:05

## 2023-05-20 RX ADMIN — LACTULOSE 30 G: 20 SOLUTION ORAL at 03:05

## 2023-05-20 RX ADMIN — LEVETIRACETAM 1000 MG: 100 SOLUTION ORAL at 10:05

## 2023-05-20 RX ADMIN — CEFEPIME 1 G: 1 INJECTION, POWDER, FOR SOLUTION INTRAMUSCULAR; INTRAVENOUS at 02:05

## 2023-05-20 RX ADMIN — RIFAXIMIN 550 MG: 550 TABLET ORAL at 09:05

## 2023-05-20 RX ADMIN — POTASSIUM BICARBONATE 40 MEQ: 391 TABLET, EFFERVESCENT ORAL at 05:05

## 2023-05-20 RX ADMIN — LACTULOSE 30 G: 20 SOLUTION ORAL at 10:05

## 2023-05-20 RX ADMIN — Medication 100 MG: at 09:05

## 2023-05-20 RX ADMIN — RIFAXIMIN 550 MG: 550 TABLET ORAL at 10:05

## 2023-05-20 RX ADMIN — CEFEPIME 1 G: 1 INJECTION, POWDER, FOR SOLUTION INTRAMUSCULAR; INTRAVENOUS at 05:05

## 2023-05-20 NOTE — PROGRESS NOTES
"\[facility]\"" Hospital Medicine Progress Note    Primary Team: \[facility]\"" Hospitalist Team A  Attending Physician: Sarita Faith MD  Resident:   Intern: Kendall    Subjective:      Received one dose of lactulose last night with one BM. She is still very altered. Interview limited due to mental status.      Objective:     Last 24 Hour Vital Signs:  BP  Min: 103/53  Max: 126/65  Temp  Av.9 °F (36.1 °C)  Min: 96.1 °F (35.6 °C)  Max: 97.4 °F (36.3 °C)  Pulse  Av.1  Min: 52  Max: 65  Resp  Av.7  Min: 16  Max: 18  SpO2  Av.6 %  Min: 97 %  Max: 100 %  Height  Av' 2" (157.5 cm)  Min: 5' 2" (157.5 cm)  Max: 5' 2.01" (157.5 cm)  Weight  Av.2 kg (92 lb 15.4 oz)  Min: 41.8 kg (92 lb 2.4 oz)  Max: 42.9 kg (94 lb 9.2 oz)  I/O last 3 completed shifts:  In: 1101.9 [I.V.:867.3; IV Piggyback:234.6]  Out: 1222 [Urine:820; Stool:402]    Physical Examination:  General: thin adult female laying in bed, awakens to light sternal rub  HEENT: NC/AT, scleral icterus  Neck: Supple, trachea midline  CV: RRR, normal S1/S2, 2/6 JOE  Resp: CTAB, no wheezing or rhonchi appreciated, normal respiratory effort  Abd: Soft, NT/ND, normoactive BSx4  Extremities: 2+ pulses,left upper extremity edema   Skin: Warm, dry, intact, jaundice, facial spider angiomas  Neuro: somnolent, opens eyes to sternal rub  Psych: unable to assess     Laboratory:  Recent Labs   Lab 23  1140 23  1804 23  0241 23  1842 23  0455   WBC 13.00*  --  13.51*  --  9.18   HGB 11.5*  --  10.9*  --  9.7*   HCT 37.6  --  37.2  --  31.6*   *  --  134*  --  93*   *  --  111*  --  107*   RDW 18.4*  --  18.0*  --  17.2*   * 153* 151*  152* 152*  --    K 5.0 5.5* 4.9  5.0 4.6  --    * 123* 121*  121* 126*  --    CO2 22* 24 22*  24 20*  --    BUN 56* 70* 60*  61* 45*  --    CREATININE 1.1 1.1 0.9  0.9 0.8  --    GLU 98 203* 176*  179* 213*  --    PROT 5.4*  --  5.2* 4.6*  --    ALBUMIN 2.1*  --  2.0* 1.8*  --  "   BILITOT 4.8*  --  3.8* 2.8*  --    AST 48*  --  55* 61*  --    ALKPHOS 119  --  106 108  --    ALT 23  --  25 23  --        Urine culture growing GNRs     Other Results:  EKG (my interpretation): NSR    Radiology Data Reviewed:   Pertinent Findings:  CT Head no acute intracranial findings    Current Medications:     Infusions:   dextrose 5 % (D5W) 100 mL/hr at 05/19/23 1425        Scheduled:   ceFEPime (MAXIPIME) IVPB  1 g Intravenous Q8H    folic acid  1 mg Per NG tube Daily    insulin regular  10 Units Intravenous Once    lactulose  30 g Per NG tube TID    levetiracetam  1,000 mg Per NG tube BID    lithium  300 mg Oral BID    multivit-min-ferrous gluconate  15 mL Per NG tube Daily    OLANZapine  5 mg Per NG tube QHS    propranoloL  20 mg Per NG tube Daily    QUEtiapine  100 mg Per NG tube QHS    rifAXIMin  550 mg Per NG tube BID    thiamine  100 mg Per NG tube Daily    zonisamide  100 mg Per NG tube Daily        PRN:  melatonin, sodium chloride 0.9%, Pharmacy to dose Vancomycin consult **AND** vancomycin - pharmacy to dose    Assessment:     Marely Hamilton is a 58 y.o.female w/ PMHx of EtOH induced cirrhosis, seizure on AEDs, and bipolar disorder who presented to [facility] on 5/18/2023 with a primary complaint of altered mental status for 1 day due to hepatic encephalopathy.      Plan:     Acute Encephalopathy due to hepatic encephalopathy   EtOh-induced Cirrhosis  Pt found altered by sitter (CNA) earlier on day of admission. She has a hx of hepatic encephalopathy 2/2 medication non-compliance. Pt found to have jaundice and scleral icterus on physical exam. Ammonia 139, Tbili 4.8 in ED.  Plan:  - Likely etiology of hepatic encephalopathy 2/2 medication non-compliance (especially given past hx and PE findings) vs UTI vs hypernatremia  - CTH with no acute abnormality   - Continue lactulose, rifaximine, thiamine, spironolactone, and multivitamin  - Keep NPO pending Speech eval and improvement  in mentation  - NGT for lactulose administration, 1x rectal lactulose      UTI  UA in ED w/ 3+ occult blood, 2.0-3.0 urobilinogen, 3+ leukocytes, 34 RBCs, >100 WBC.  Plan:  - Given CTX and Vanc in ED  - Repeat UA w/ cx d/t dirty catch on initial UA  - previous urine culture with >100K CFU Proteus mirabilis   - will treat empirically with cefepime while repeat cultures pending   - cultures growing GNRs     Hypernatremia  Na 155 on CMP in ED.  Plan:  - Give 1 L LR bolus, followed by LR infusion @ 75 mL/hr  - Goal Na decrease of 6-8 in first 24 hrs  - Will continue to monitor  - fluids changed to D5W     Elevated Troponin  Troponin mildly elevated to 0.037 in ED.  Plan:  - Likely d/t demand in setting of hypovolemia  - EKG did not demonstrate new abnormalities  - Troponin peaked, down trend to 0.035  - Continue to routinely monitor vitals     AYESHA, improving   Cr 1.1 on CMP in ED, baseline 0.6.  Plan:  - Likely pre-renal in etiology given hypovolemia  - continue IVF  - Renally dose meds     Seizure Disorder  Pt has hx of seizures and is currently on Keppra 1000 mg BID and Zonisamide 100 mg daily at home. No reported seizure for several years. Per chart review, seizures appear to have taken place in setting of EtOH withdrawal.  Plan:  - Continue home Keppra and Zonisamide  - Continue to monitor  - Recommend Neuro f/u at discharge     Bipolar Disorder  H/o Self Harm  Pt has bipolar disorder w/ h/o suicide attempt including intentional overdose w/ acetaminophen, self harm, and medication non-compliance.  Plan:  - Continue home Lithium, Seroquel, and Zyprexa  - Ensure ordered medications are administered  - Continue to closely monitor pt's behavior as encephalopathy improves  - lithium level low, likely non adherence consistent with collateral obtained from family      Hypercalcemia  Ca 11.9 on CMP in ED.  Plan:  - ionized Ca, PTH elevated   - continue IVF     Thrombocytopenia  Pt has known hx of thrombocytopenia. Plt 147  on CBC in ED.  Plan:  - due to cirrhosis  - no acute conern for bleeding, continue to monitor      Healthcare Maintenance  Pt not UTD on vaccinations or preventative screenings.  Plan:  - F/u w/ PCP at discharge to discuss preventative care    Dispo: pending improvement in hepatic encephalopathy     Lydia Lawrence MD  [facility] Med PGY 1    [facility] Medicine Hospitalist Pager numbers:   [facility] Hospitalist Medicine Team A (Angela/Vianney): [phone]  [facility] Hospitalist Medicine Team B (Lyn/Edenilson):  [phone]

## 2023-05-20 NOTE — NURSING
"RAPID RESPONSE NURSE PROACTIVE ROUNDING NOTE     Time of Visit: 0641    Admit Date: 2023  LOS: 2  Code Status: Full Code   Date of Visit: 2023  : 1965  Age: 58 y.o.  Sex: female  Race: White  Bed: K485/K485 A:   MRN: [id number]  Was the patient discharged from an ICU this admission? No   Was the patient discharged from a PACU within last 24 hours?  No  Did the patient receive conscious sedation/general anesthesia in last 24 hours?  No  Was the patient in the ED within the past 24 hours?  Yes and No  Was the patient started on NIPPV within the past 24 hours?  No  Attending Physician: Sarita Faith MD  Primary Service: Networked reference to record PCT     ASSESSMENT     Notified by charge RN via phone call.  Reason for alert: AMS    Diagnosis: <principal problem not specified>    Abnormal Vital Signs: /61 (Patient Position: Lying)   Pulse 65   Temp 96.3 °F (35.7 °C) (Axillary)   Resp 16   Ht 5' 2" (1.575 m)   Wt 42.9 kg (94 lb 9.2 oz)   SpO2 98%   BMI 17.30 kg/m²      Clinical Issues: Neuro    Patient  has a past medical history of Addiction to drug, Alcohol abuse, Alcohol abuse, in remission, Anemia, Anxiety, Behavioral problem, Bipolar disorder, Bipolar disorder in remission, Cirrhosis, Laennec's, Depression, Encounter for blood transfusion, Epistaxis, Fatigue, Glaucoma, Hematuria, Hepatic encephalopathy, Hepatic enlargement, History of psychiatric care, History of psychiatric hospitalization, History of seizure, hx of intentional Tylenol overdose, psychiatric care, Macrocytic anemia, Jeana, Osteoarthritis, Other ascites, Psychiatric exam requested by authority, Psychiatric problem, Psychosis, Renal disorder, Seizures, Self-harming behavior, Suicide attempt, Therapy, and Thrombocytopenia.      Pt lying in bed with eyes closed. Arouseable to sternal rub. Pt will only moan to painful stimuli. Unable to follow commands at this time. According to Charge RN, current status is pt's " baseline from admission. D5% currently infusing @ 100cc/hr, and Lactulose 20g scheduled, 3x a day. VSS at this time.     INTERVENTIONS/ RECOMMENDATIONS     Chart reviewed. Discussed plan of care with RNLynsey. Reported off to ICU team for follow up.    PHYSICIAN ESCALATION     Yes/No  No    Orders received and case discussed with NA.    Disposition: Remain in room 485.    FOLLOW-UP     Call back the Rapid Response Nurse, Danita Rivera RN at [phone] for additional questions or concerns.

## 2023-05-20 NOTE — PLAN OF CARE
Problem: Infection  Goal: Absence of Infection Signs and Symptoms  Outcome: Ongoing, Progressing     Problem: Adult Inpatient Plan of Care  Goal: Plan of Care Review  Outcome: Ongoing, Progressing  Goal: Absence of Hospital-Acquired Illness or Injury  Outcome: Ongoing, Progressing  Goal: Optimal Comfort and Wellbeing  Outcome: Ongoing, Progressing  Goal: Readiness for Transition of Care  Outcome: Ongoing, Progressing     Problem: Fall Injury Risk  Goal: Absence of Fall and Fall-Related Injury  Outcome: Ongoing, Progressing     Problem: Skin Injury Risk Increased  Goal: Skin Health and Integrity  Outcome: Ongoing, Progressing     Problem: Behavioral Health Comorbidity  Goal: Maintenance of Behavioral Health Symptom Control  Outcome: Ongoing, Progressing     Problem: Seizure Disorder Comorbidity  Goal: Maintenance of Seizure Control  Outcome: Ongoing, Progressing     Problem: Adjustment to Illness (Liver Failure)  Goal: Optimal Coping with Liver Failure  Outcome: Ongoing, Progressing     Problem: Fluid and Electrolyte Imbalance (Liver Failure)  Goal: Fluid and Electrolyte Balance  Outcome: Ongoing, Progressing     Problem: Gastrointestinal Complications (Liver Failure)  Goal: Optimal Gastrointestinal Function  Outcome: Ongoing, Progressing     Problem: Impaired Coagulation (Liver Failure)  Goal: Optimal Coagulation Function  Outcome: Ongoing, Progressing     Problem: Infection (Liver Failure)  Goal: Absence of Infection Signs and Symptoms  Outcome: Ongoing, Progressing     Problem: Neurologic Function Impaired (Liver Failure)  Goal: Optimal Neurologic Function  Outcome: Ongoing, Progressing     Problem: Oral Intake Inadequate (Liver Failure)  Goal: Improved Oral Intake  Outcome: Ongoing, Progressing     Problem: Pain (Liver Failure)  Goal: Optimal Pain Control  Outcome: Ongoing, Progressing     Problem: Renal Dysfunction (Liver Failure)  Goal: Optimize Renal Function  Outcome: Ongoing, Progressing     Problem: Fluid  and Electrolyte Imbalance (Acute Kidney Injury/Impairment)  Goal: Fluid and Electrolyte Balance  Outcome: Ongoing, Progressing     Problem: Oral Intake Inadequate (Acute Kidney Injury/Impairment)  Goal: Optimal Nutrition Intake  Outcome: Ongoing, Progressing     Problem: Renal Function Impairment (Acute Kidney Injury/Impairment)  Goal: Effective Renal Function  Outcome: Ongoing, Progressing

## 2023-05-21 LAB
ALBUMIN SERPL BCP-MCNC: 2 G/DL (ref 3.5–5.2)
ALP SERPL-CCNC: 135 U/L (ref 55–135)
ALT SERPL W/O P-5'-P-CCNC: 53 U/L (ref 10–44)
AMMONIA PLAS-SCNC: 55 UMOL/L (ref 10–50)
ANION GAP SERPL CALC-SCNC: 3 MMOL/L (ref 8–16)
ANISOCYTOSIS BLD QL SMEAR: SLIGHT
AST SERPL-CCNC: 97 U/L (ref 10–40)
BASO STIPL BLD QL SMEAR: ABNORMAL
BASOPHILS # BLD AUTO: 0.02 K/UL (ref 0–0.2)
BASOPHILS NFR BLD: 0.1 % (ref 0–1.9)
BILIRUB SERPL-MCNC: 3.2 MG/DL (ref 0.1–1)
BUN SERPL-MCNC: 19 MG/DL (ref 6–20)
BURR CELLS BLD QL SMEAR: ABNORMAL
CALCIUM SERPL-MCNC: 11.7 MG/DL (ref 8.7–10.5)
CHLORIDE SERPL-SCNC: 117 MMOL/L (ref 95–110)
CO2 SERPL-SCNC: 24 MMOL/L (ref 23–29)
CREAT SERPL-MCNC: 0.7 MG/DL (ref 0.5–1.4)
DACRYOCYTES BLD QL SMEAR: ABNORMAL
DIFFERENTIAL METHOD: ABNORMAL
EOSINOPHIL # BLD AUTO: 0.7 K/UL (ref 0–0.5)
EOSINOPHIL NFR BLD: 4 % (ref 0–8)
ERYTHROCYTE [DISTWIDTH] IN BLOOD BY AUTOMATED COUNT: 17.6 % (ref 11.5–14.5)
EST. GFR  (NO RACE VARIABLE): >60 ML/MIN/1.73 M^2
GLUCOSE SERPL-MCNC: 145 MG/DL (ref 70–110)
HCT VFR BLD AUTO: 39.2 % (ref 37–48.5)
HGB BLD-MCNC: 12 G/DL (ref 12–16)
HYPOCHROMIA BLD QL SMEAR: ABNORMAL
IMM GRANULOCYTES # BLD AUTO: 0.14 K/UL (ref 0–0.04)
IMM GRANULOCYTES NFR BLD AUTO: 0.8 % (ref 0–0.5)
LYMPHOCYTES # BLD AUTO: 2.2 K/UL (ref 1–4.8)
LYMPHOCYTES NFR BLD: 12.6 % (ref 18–48)
MCH RBC QN AUTO: 33.1 PG (ref 27–31)
MCHC RBC AUTO-ENTMCNC: 30.6 G/DL (ref 32–36)
MCV RBC AUTO: 108 FL (ref 82–98)
MONOCYTES # BLD AUTO: 1.8 K/UL (ref 0.3–1)
MONOCYTES NFR BLD: 10 % (ref 4–15)
NEUTROPHILS # BLD AUTO: 12.7 K/UL (ref 1.8–7.7)
NEUTROPHILS NFR BLD: 72.5 % (ref 38–73)
NRBC BLD-RTO: 0 /100 WBC
OVALOCYTES BLD QL SMEAR: ABNORMAL
PLATELET # BLD AUTO: 99 K/UL (ref 150–450)
PLATELET BLD QL SMEAR: ABNORMAL
PMV BLD AUTO: 11.1 FL (ref 9.2–12.9)
POCT GLUCOSE: 103 MG/DL (ref 70–110)
POCT GLUCOSE: 104 MG/DL (ref 70–110)
POCT GLUCOSE: 106 MG/DL (ref 70–110)
POCT GLUCOSE: 156 MG/DL (ref 70–110)
POCT GLUCOSE: 91 MG/DL (ref 70–110)
POIKILOCYTOSIS BLD QL SMEAR: SLIGHT
POLYCHROMASIA BLD QL SMEAR: ABNORMAL
POTASSIUM SERPL-SCNC: 4.1 MMOL/L (ref 3.5–5.1)
PROT SERPL-MCNC: 5 G/DL (ref 6–8.4)
RBC # BLD AUTO: 3.62 M/UL (ref 4–5.4)
SODIUM SERPL-SCNC: 144 MMOL/L (ref 136–145)
STOMATOCYTES BLD QL SMEAR: ABNORMAL
WBC # BLD AUTO: 17.55 K/UL (ref 3.9–12.7)

## 2023-05-21 PROCEDURE — 80053 COMPREHEN METABOLIC PANEL: CPT | Performed by: STUDENT IN AN ORGANIZED HEALTH CARE EDUCATION/TRAINING PROGRAM

## 2023-05-21 PROCEDURE — 25000003 PHARM REV CODE 250: Performed by: INTERNAL MEDICINE

## 2023-05-21 PROCEDURE — 99900035 HC TECH TIME PER 15 MIN (STAT)

## 2023-05-21 PROCEDURE — 36415 COLL VENOUS BLD VENIPUNCTURE: CPT | Performed by: STUDENT IN AN ORGANIZED HEALTH CARE EDUCATION/TRAINING PROGRAM

## 2023-05-21 PROCEDURE — 36415 COLL VENOUS BLD VENIPUNCTURE: CPT

## 2023-05-21 PROCEDURE — 27000221 HC OXYGEN, UP TO 24 HOURS

## 2023-05-21 PROCEDURE — 25000003 PHARM REV CODE 250

## 2023-05-21 PROCEDURE — 63600175 PHARM REV CODE 636 W HCPCS

## 2023-05-21 PROCEDURE — 21400001 HC TELEMETRY ROOM

## 2023-05-21 PROCEDURE — 94761 N-INVAS EAR/PLS OXIMETRY MLT: CPT

## 2023-05-21 PROCEDURE — 82140 ASSAY OF AMMONIA: CPT | Performed by: STUDENT IN AN ORGANIZED HEALTH CARE EDUCATION/TRAINING PROGRAM

## 2023-05-21 PROCEDURE — 85025 COMPLETE CBC W/AUTO DIFF WBC: CPT

## 2023-05-21 RX ORDER — SODIUM CHLORIDE, SODIUM LACTATE, POTASSIUM CHLORIDE, CALCIUM CHLORIDE 600; 310; 30; 20 MG/100ML; MG/100ML; MG/100ML; MG/100ML
INJECTION, SOLUTION INTRAVENOUS CONTINUOUS
Status: DISCONTINUED | OUTPATIENT
Start: 2023-05-21 | End: 2023-05-22

## 2023-05-21 RX ADMIN — LACTULOSE 30 G: 20 SOLUTION ORAL at 09:05

## 2023-05-21 RX ADMIN — CEFEPIME 1 G: 1 INJECTION, POWDER, FOR SOLUTION INTRAMUSCULAR; INTRAVENOUS at 04:05

## 2023-05-21 RX ADMIN — LEVETIRACETAM 1000 MG: 100 SOLUTION ORAL at 10:05

## 2023-05-21 RX ADMIN — FOLIC ACID 1 MG: 1 TABLET ORAL at 09:05

## 2023-05-21 RX ADMIN — Medication 100 MG: at 09:05

## 2023-05-21 RX ADMIN — PROPRANOLOL HYDROCHLORIDE 20 MG: 10 TABLET ORAL at 09:05

## 2023-05-21 RX ADMIN — SODIUM CHLORIDE, POTASSIUM CHLORIDE, SODIUM LACTATE AND CALCIUM CHLORIDE: 600; 310; 30; 20 INJECTION, SOLUTION INTRAVENOUS at 10:05

## 2023-05-21 RX ADMIN — RIFAXIMIN 550 MG: 550 TABLET ORAL at 10:05

## 2023-05-21 RX ADMIN — LITHIUM CARBONATE 300 MG: 150 CAPSULE, GELATIN COATED ORAL at 09:05

## 2023-05-21 RX ADMIN — ZONISAMIDE 100 MG: 100 CAPSULE ORAL at 09:05

## 2023-05-21 RX ADMIN — LACTULOSE 30 G: 20 SOLUTION ORAL at 03:05

## 2023-05-21 RX ADMIN — MULTIVITAMIN 15 ML: LIQUID ORAL at 09:05

## 2023-05-21 RX ADMIN — LITHIUM CARBONATE 300 MG: 150 CAPSULE, GELATIN COATED ORAL at 10:05

## 2023-05-21 RX ADMIN — LEVETIRACETAM 1000 MG: 100 SOLUTION ORAL at 09:05

## 2023-05-21 RX ADMIN — RIFAXIMIN 550 MG: 550 TABLET ORAL at 09:05

## 2023-05-21 RX ADMIN — CEFTRIAXONE 1 G: 1 INJECTION, POWDER, FOR SOLUTION INTRAMUSCULAR; INTRAVENOUS at 03:05

## 2023-05-21 RX ADMIN — LACTULOSE 30 G: 20 SOLUTION ORAL at 10:05

## 2023-05-21 NOTE — PROGRESS NOTES
[facility] Medicine Progress Note    Primary Team: [facility] Hospitalist Team A  Attending Physician: Sarita Faith MD  Resident: Jorge  Intern: Kendall    Subjective:      Interview limited due to patient's mental status. Grunting in response to questions.  Per her nurse, she had multiple bowel movements overnight though no stool output recorded.      Objective:     Last 24 Hour Vital Signs:  BP  Min: 101/59  Max: 126/65  Temp  Av.3 °F (36.3 °C)  Min: 96 °F (35.6 °C)  Max: 98.4 °F (36.9 °C)  Pulse  Av.8  Min: 53  Max: 68  Resp  Av.7  Min: 15  Max: 18  SpO2  Av.5 %  Min: 93 %  Max: 99 %  I/O last 3 completed shifts:  In: 0   Out: 400 [Stool:400]    Physical Examination:  General: thin adult female laying in bed, awakens to light sternal rub  HEENT: NC/AT  Neck: Supple, trachea midline  CV: RRR, normal S1/S2, 2/6 JOE  Resp: CTAB, no wheezing or rhonchi appreciated, normal respiratory effort  Abd: Soft, NT/ND, normoactive BSx4  Extremities: 2+ pulses, bruising and edema in bilateral upper extremities   Skin: Warm, dry, intact, jaundice, facial spider angiomas  Neuro: somnolent, grunts to pain and sternal rub  Psych: unable to assess     Laboratory:  Recent Labs   Lab 23  0241 23  1842 23  0455 23  0854 23  1803 23  0407   WBC 13.51*  --  9.18  --   --  17.55*   HGB 10.9*  --  9.7*  --   --  12.0   HCT 37.2  --  31.6*  --   --  39.2   *  --  93*  --   --  99*   *  --  107*  --   --  108*   RDW 18.0*  --  17.2*  --   --  17.6*   *  152* 152*  --  147* 145  --    K 4.9  5.0 4.6  --  3.3* 3.9  --    *  121* 126*  --  121* 121*  --    CO2 22*  24 20*  --  23 18*  --    BUN 60*  61* 45*  --  29* 23*  --    CREATININE 0.9  0.9 0.8  --  0.8 0.9  --    *  179* 213*  --  183* 151*  --    PROT 5.2* 4.6*  --  4.7* 5.2*  --    ALBUMIN 2.0* 1.8*  --  1.9* 2.0*  --    BILITOT 3.8* 2.8*  --  2.7* 2.7*  --    AST 55* 61*  --  55* 99*  --     ALKPHOS 106 108  --  109 126  --    ALT 25 23  --  32 43  --        Urine culture growing Proteus mirabilis     Other Results:  EKG (my interpretation): NSR    Radiology Data Reviewed:   Pertinent Findings:  CT Head no acute intracranial findings    Current Medications:     Infusions:   dextrose 5 % (D5W) 100 mL/hr at 05/20/23 0945        Scheduled:   ceFEPime (MAXIPIME) IVPB  1 g Intravenous Q12H    folic acid  1 mg Per NG tube Daily    insulin regular  10 Units Intravenous Once    lactulose  30 g Per NG tube TID    levetiracetam  1,000 mg Per NG tube BID    lithium  300 mg Oral BID    multivit-min-ferrous gluconate  15 mL Per NG tube Daily    OLANZapine  5 mg Per NG tube QHS    propranoloL  20 mg Per NG tube Daily    QUEtiapine  100 mg Per NG tube QHS    rifAXIMin  550 mg Per NG tube BID    thiamine  100 mg Per NG tube Daily    zonisamide  100 mg Per NG tube Daily        PRN:  melatonin, sodium chloride 0.9%    Assessment:     Marely Hamilton is a 58 y.o.female w/ PMHx of EtOH induced cirrhosis, seizure on AEDs, and bipolar disorder who presented to [facility] on 5/18/2023 with a primary complaint of altered mental status for 1 day due to hepatic encephalopathy and UTI.      Plan:     Acute Encephalopathy due to hepatic encephalopathy   EtOh-induced Cirrhosis  Pt found altered by sitter (CNA) earlier on day of admission. She has a hx of hepatic encephalopathy 2/2 medication non-compliance. Pt found to have jaundice and scleral icterus on physical exam. Ammonia 139, Tbili 4.8 in ED.  Plan:  - Likely etiology of hepatic encephalopathy 2/2 medication non-compliance (especially given past hx and PE findings) vs UTI vs hypernatremia  - CTH with no acute abnormality   - Continue lactulose, rifaximine, thiamine, spironolactone, and multivitamin  - Keep NPO pending Speech eval and improvement in mentation  - NGT for lactulose administration, 1x rectal lactulose      Proteus UTI  UA in ED w/ 3+ occult  blood, 2.0-3.0 urobilinogen, 3+ leukocytes, 34 RBCs, >100 WBC.  Plan:  - Given CTX and Vanc in ED  - Repeat UA w/ cx d/t dirty catch on initial UA  - previous urine culture with >100K CFU Proteus mirabilis   - cultures growing Proteus Mirabilis   - continue cefepime      Hypernatremia  Na 155 on CMP in ED.  Plan:  - Give 1 L LR bolus, followed by LR infusion @ 75 mL/hr  - Goal Na decrease of 6-8 in first 24 hrs  - Will continue to monitor  - fluids changed to D5W     Elevated Troponin  Troponin mildly elevated to 0.037 in ED.  Plan:  - Likely d/t demand in setting of hypovolemia  - EKG did not demonstrate new abnormalities  - Troponin peaked, down trend to 0.035  - Continue to routinely monitor vitals     AYESHA, improving   Cr 1.1 on CMP in ED, baseline 0.6.  Plan:  - Likely pre-renal in etiology given hypovolemia  - continue IVF  - Renally dose meds     Seizure Disorder  Pt has hx of seizures and is currently on Keppra 1000 mg BID and Zonisamide 100 mg daily at home. No reported seizure for several years. Per chart review, seizures appear to have taken place in setting of EtOH withdrawal.  Plan:  - Continue home Keppra and Zonisamide  - Continue to monitor  - Recommend Neuro f/u at discharge     Bipolar Disorder  H/o Self Harm  Pt has bipolar disorder w/ h/o suicide attempt including intentional overdose w/ acetaminophen, self harm, and medication non-compliance.  Plan:  - Continue home Lithium, Seroquel, and Zyprexa  - Ensure ordered medications are administered  - Continue to closely monitor pt's behavior as encephalopathy improves  - lithium level low, likely non adherence consistent with collateral obtained from family      Hypercalcemia  Ca 11.9 on CMP in ED.  Plan:  - ionized Ca, PTH elevated   - continue IVF     Thrombocytopenia  Pt has known hx of thrombocytopenia. Plt 147 on CBC in ED.  Plan:  - due to cirrhosis  - no acute conern for bleeding, continue to monitor      Healthcare Maintenance  Pt not UTD on  vaccinations or preventative screenings.  Plan:  - F/u w/ PCP at discharge to discuss preventative care    Dispo: pending improvement in hepatic encephalopathy     Bridget Argueta MD  [employer] Med Peds PGY 1    [facility] Medicine Hospitalist Pager numbers:   [facility] Hospitalist Medicine Team A (Angela/Vianney): [phone]  [facility] Hospitalist Medicine Team B (Lyn/Edenilson):  [phone]

## 2023-05-21 NOTE — PROGRESS NOTES
Pharmacist Renal Dose Adjustment Note    Marely Hamilotn is a 58 y.o. female being treated with the medication cefepime    Patient Data:    Vital Signs (Most Recent):  Temp: 98.4 °F (36.9 °C) (05/21/23 0010)  Pulse: (!) 57 (05/21/23 0435)  Resp: 15 (05/21/23 0010)  BP: (!) 101/59 (05/21/23 0010)  SpO2: 98 % (05/21/23 0424) Vital Signs (72h Range):  Temp:  [96 °F (35.6 °C)-99.4 °F (37.4 °C)]   Pulse:  []   Resp:  [15-20]   BP: ()/(49-82)   SpO2:  [91 %-100 %]      Recent Labs   Lab 05/19/23  1842 05/20/23  0854 05/20/23  1803   CREATININE 0.8 0.8 0.9     Serum creatinine: 0.9 mg/dL 05/20/23 1803  Estimated creatinine clearance: 46.7 mL/min    Medication:cefepime dose: 1G frequency q8h will be changed to medication:cefepime dose:1G frequency:q12h    Pharmacist's Name: Rena Garrett  Pharmacist's Extension: [id number]

## 2023-05-21 NOTE — NURSING
RAPID RESPONSE NURSE PROACTIVE ROUNDING NOTE       Time of Visit: 0800    Admit Date: 2023  LOS: 3  Code Status: Full Code   Date of Visit: 2023  : 1965  Age: 58 y.o.  Sex: female  Race: White  Bed: K485/K485 A:   MRN: [id number]  Was the patient discharged from an ICU this admission? No   Was the patient discharged from a PACU within last 24 hours? No   Did the patient receive conscious sedation/general anesthesia in last 24 hours? No   Was the patient in the ED within the past 24 hours? No   Was the patient on NIPPV within the past 24 hours? No   Attending Physician: Sarita Faith MD  Primary Service: [facility] A   Time spent at the bedside: < 15 min    SITUATION    Notified by Epic patient alert  Reason for alert: Proactive Round    Diagnosis: Acute encephalopathy   has a past medical history of Addiction to drug, Alcohol abuse, Alcohol abuse, in remission, Anemia, Anxiety, Behavioral problem, Bipolar disorder, Bipolar disorder in remission, Cirrhosis, Laennec's, Depression, Encounter for blood transfusion, Epistaxis, Fatigue, Glaucoma, Hematuria, Hepatic encephalopathy, Hepatic enlargement, History of psychiatric care, History of psychiatric hospitalization, History of seizure, hx of intentional Tylenol overdose, psychiatric care, Macrocytic anemia, Jeana, Osteoarthritis, Other ascites, Psychiatric exam requested by authority, Psychiatric problem, Psychosis, Renal disorder, Seizures, Self-harming behavior, Suicide attempt, Therapy, and Thrombocytopenia.    Last Vitals:  Temp: 96 °F (35.6 °C) (803)  Pulse: 63 (812)  Resp: 19 (812)  BP: 129/63 ( 08)  SpO2: 96 % (812)    24 Hour Vitals Range:  Temp:  [96 °F (35.6 °C)-98.4 °F (36.9 °C)]   Pulse:  [53-68]   Resp:  [15-20]   BP: (101-129)/(59-84)   SpO2:  [93 %-99 %]     Clinical Issues: Neuro    ASSESSMENT/INTERVENTIONS    Neuro assessment    RECOMMENDATIONS  Cont to monitor    Discussed plan of care  with bedside RNHerb RN     PROVIDER ESCALATION    Physician escalation: No    Orders received and case discussed with NA.    Disposition:Remain in room      FOLLOW UP    Call back the Rapid Response Nurse, Clara Torrez RN at [phone] for additional questions or concerns.

## 2023-05-21 NOTE — PROGRESS NOTES
RAPID RESPONSE NURSE PROACTIVE ROUNDING NOTE     Time of Visit:     Admit Date: 2023  LOS: 2  Code Status: Full Code   Date of Visit: 2023  : 1965  Age: 58 y.o.  Sex: female  Race: White  Bed: K485/K485 A:   MRN: [id number]  Was the patient discharged from an ICU this admission? No   Was the patient discharged from a PACU within last 24 hours?  No  Did the patient receive conscious sedation/general anesthesia in last 24 hours?  No  Was the patient in the ED within the past 24 hours?  No  Was the patient started on NIPPV within the past 24 hours?  No  Attending Physician: Sarita Faith MD  Primary Service: [facility]    ASSESSMENT     Notified by previous RRN during handoff.  Reason for alert: Hypernatremia, altered mental    Diagnosis: Acute encephalopathy    Clinical Issues: Neuro    Patient  has a past medical history of Addiction to drug, Alcohol abuse, Alcohol abuse, in remission, Anemia, Anxiety, Behavioral problem, Bipolar disorder, Bipolar disorder in remission, Cirrhosis, Laennec's, Depression, Encounter for blood transfusion, Epistaxis, Fatigue, Glaucoma, Hematuria, Hepatic encephalopathy, Hepatic enlargement, History of psychiatric care, History of psychiatric hospitalization, History of seizure, hx of intentional Tylenol overdose, psychiatric care, Macrocytic anemia, Jeana, Osteoarthritis, Other ascites, Psychiatric exam requested by authority, Psychiatric problem, Psychosis, Renal disorder, Seizures, Self-harming behavior, Suicide attempt, Therapy, and Thrombocytopenia.      Pt lying in bed with eyes closed. Unable to follow commands, and nonverbal at this time. Pt is somnolent, and only aroused by sternal rubbing, or painful stimuli. Bilateral upper extremities positive for +2/+3 pitting edema. Vitals: 103/63, MAP 76, temp 96 (ax), hr 56. Currently on 2 L NC, no signs of resp distress.   D5% infusing at 100cc/hr. Most recent Na+ 145.        INTERVENTIONS/  RECOMMENDATIONS     Spoke with team, Cody Simental MD. Ok to hold olanzapine 5 mg, and seroquel 100 mg, due to pt's decreased arousability.   Discussed plan of care regarding sodium correction. Labs reviewed with MD. MD states to have pt remain on D5% gtt over night, and allow day team to decide if gtt should be discontinued. Next CMP for tomorrow AM. No new orders at this time.    Discussed plan of care with bedside nurse, Yan Barroso.    PHYSICIAN ESCALATION     Yes/No  Yes    Orders received and case discussed with Dr. Cody Simental .    Disposition: Remain in room 485.    FOLLOW-UP     Call back the Rapid Response Nurse, Danita Rivera RN at [phone] for additional questions or concerns.

## 2023-05-21 NOTE — PLAN OF CARE
Problem: Infection  Goal: Absence of Infection Signs and Symptoms  Outcome: Ongoing, Progressing     Problem: Adult Inpatient Plan of Care  Goal: Plan of Care Review  Outcome: Ongoing, Progressing  Goal: Patient-Specific Goal (Individualized)  Outcome: Ongoing, Progressing  Goal: Absence of Hospital-Acquired Illness or Injury  Outcome: Ongoing, Progressing  Goal: Optimal Comfort and Wellbeing  Outcome: Ongoing, Progressing  Goal: Readiness for Transition of Care  Outcome: Ongoing, Progressing     Problem: Fall Injury Risk  Goal: Absence of Fall and Fall-Related Injury  Outcome: Ongoing, Progressing     Problem: Skin Injury Risk Increased  Goal: Skin Health and Integrity  Outcome: Ongoing, Progressing     Problem: Behavioral Health Comorbidity  Goal: Maintenance of Behavioral Health Symptom Control  Outcome: Ongoing, Progressing     Problem: Seizure Disorder Comorbidity  Goal: Maintenance of Seizure Control  Outcome: Ongoing, Progressing     Problem: Adjustment to Illness (Liver Failure)  Goal: Optimal Coping with Liver Failure  Outcome: Ongoing, Progressing     Problem: Fluid and Electrolyte Imbalance (Liver Failure)  Goal: Fluid and Electrolyte Balance  Outcome: Ongoing, Progressing     Problem: Gastrointestinal Complications (Liver Failure)  Goal: Optimal Gastrointestinal Function  Outcome: Ongoing, Progressing     Problem: Impaired Coagulation (Liver Failure)  Goal: Optimal Coagulation Function  Outcome: Ongoing, Progressing     Problem: Infection (Liver Failure)  Goal: Absence of Infection Signs and Symptoms  Outcome: Ongoing, Progressing     Problem: Neurologic Function Impaired (Liver Failure)  Goal: Optimal Neurologic Function  Outcome: Ongoing, Progressing     Problem: Oral Intake Inadequate (Liver Failure)  Goal: Improved Oral Intake  Outcome: Ongoing, Progressing     Problem: Pain (Liver Failure)  Goal: Optimal Pain Control  Outcome: Ongoing, Progressing     Problem: Renal Dysfunction (Liver  Failure)  Goal: Optimize Renal Function  Outcome: Ongoing, Progressing     Problem: Respiratory Compromise (Liver Failure)  Goal: Effective Oxygenation and Ventilation  Outcome: Ongoing, Progressing     Problem: Fluid and Electrolyte Imbalance (Acute Kidney Injury/Impairment)  Goal: Fluid and Electrolyte Balance  Outcome: Ongoing, Progressing     Problem: Oral Intake Inadequate (Acute Kidney Injury/Impairment)  Goal: Optimal Nutrition Intake  Outcome: Ongoing, Progressing     Problem: Renal Function Impairment (Acute Kidney Injury/Impairment)  Goal: Effective Renal Function  Outcome: Ongoing, Progressing

## 2023-05-22 LAB
ALBUMIN SERPL BCP-MCNC: 1.8 G/DL (ref 3.5–5.2)
ALP SERPL-CCNC: 146 U/L (ref 55–135)
ALT SERPL W/O P-5'-P-CCNC: 48 U/L (ref 10–44)
ANION GAP SERPL CALC-SCNC: 5 MMOL/L (ref 8–16)
AST SERPL-CCNC: 90 U/L (ref 10–40)
BASOPHILS # BLD AUTO: 0.03 K/UL (ref 0–0.2)
BASOPHILS NFR BLD: 0.2 % (ref 0–1.9)
BILIRUB SERPL-MCNC: 2.6 MG/DL (ref 0.1–1)
BUN SERPL-MCNC: 19 MG/DL (ref 6–20)
CALCIUM SERPL-MCNC: 11.9 MG/DL (ref 8.7–10.5)
CHLORIDE SERPL-SCNC: 118 MMOL/L (ref 95–110)
CO2 SERPL-SCNC: 22 MMOL/L (ref 23–29)
CREAT SERPL-MCNC: 0.9 MG/DL (ref 0.5–1.4)
DIFFERENTIAL METHOD: ABNORMAL
EOSINOPHIL # BLD AUTO: 0.4 K/UL (ref 0–0.5)
EOSINOPHIL NFR BLD: 2.5 % (ref 0–8)
ERYTHROCYTE [DISTWIDTH] IN BLOOD BY AUTOMATED COUNT: 18.8 % (ref 11.5–14.5)
EST. GFR  (NO RACE VARIABLE): >60 ML/MIN/1.73 M^2
GLUCOSE SERPL-MCNC: 102 MG/DL (ref 70–110)
HCT VFR BLD AUTO: 33 % (ref 37–48.5)
HGB BLD-MCNC: 10.7 G/DL (ref 12–16)
IMM GRANULOCYTES # BLD AUTO: 0.2 K/UL (ref 0–0.04)
IMM GRANULOCYTES NFR BLD AUTO: 1.2 % (ref 0–0.5)
LYMPHOCYTES # BLD AUTO: 2.6 K/UL (ref 1–4.8)
LYMPHOCYTES NFR BLD: 15.5 % (ref 18–48)
MCH RBC QN AUTO: 33.6 PG (ref 27–31)
MCHC RBC AUTO-ENTMCNC: 32.4 G/DL (ref 32–36)
MCV RBC AUTO: 104 FL (ref 82–98)
MONOCYTES # BLD AUTO: 1.3 K/UL (ref 0.3–1)
MONOCYTES NFR BLD: 8 % (ref 4–15)
NEUTROPHILS # BLD AUTO: 12.2 K/UL (ref 1.8–7.7)
NEUTROPHILS NFR BLD: 72.6 % (ref 38–73)
NRBC BLD-RTO: 0 /100 WBC
PLATELET # BLD AUTO: 92 K/UL (ref 150–450)
PMV BLD AUTO: 10.8 FL (ref 9.2–12.9)
POCT GLUCOSE: 117 MG/DL (ref 70–110)
POCT GLUCOSE: 125 MG/DL (ref 70–110)
POCT GLUCOSE: 125 MG/DL (ref 70–110)
POCT GLUCOSE: 151 MG/DL (ref 70–110)
POCT GLUCOSE: 154 MG/DL (ref 70–110)
POCT GLUCOSE: 91 MG/DL (ref 70–110)
POTASSIUM SERPL-SCNC: 4 MMOL/L (ref 3.5–5.1)
PROT SERPL-MCNC: 4.9 G/DL (ref 6–8.4)
RBC # BLD AUTO: 3.18 M/UL (ref 4–5.4)
SODIUM SERPL-SCNC: 145 MMOL/L (ref 136–145)
WBC # BLD AUTO: 16.84 K/UL (ref 3.9–12.7)

## 2023-05-22 PROCEDURE — 25000003 PHARM REV CODE 250

## 2023-05-22 PROCEDURE — 85025 COMPLETE CBC W/AUTO DIFF WBC: CPT

## 2023-05-22 PROCEDURE — 63600175 PHARM REV CODE 636 W HCPCS

## 2023-05-22 PROCEDURE — 94761 N-INVAS EAR/PLS OXIMETRY MLT: CPT

## 2023-05-22 PROCEDURE — 36415 COLL VENOUS BLD VENIPUNCTURE: CPT

## 2023-05-22 PROCEDURE — 27000221 HC OXYGEN, UP TO 24 HOURS

## 2023-05-22 PROCEDURE — 80053 COMPREHEN METABOLIC PANEL: CPT

## 2023-05-22 PROCEDURE — 97530 THERAPEUTIC ACTIVITIES: CPT

## 2023-05-22 PROCEDURE — 21400001 HC TELEMETRY ROOM

## 2023-05-22 PROCEDURE — 92610 EVALUATE SWALLOWING FUNCTION: CPT

## 2023-05-22 PROCEDURE — 97166 OT EVAL MOD COMPLEX 45 MIN: CPT

## 2023-05-22 PROCEDURE — 25000003 PHARM REV CODE 250: Performed by: INTERNAL MEDICINE

## 2023-05-22 PROCEDURE — 99900035 HC TECH TIME PER 15 MIN (STAT)

## 2023-05-22 PROCEDURE — 97535 SELF CARE MNGMENT TRAINING: CPT

## 2023-05-22 RX ORDER — DEXTROSE MONOHYDRATE 50 MG/ML
INJECTION, SOLUTION INTRAVENOUS CONTINUOUS
Status: DISCONTINUED | OUTPATIENT
Start: 2023-05-22 | End: 2023-05-22

## 2023-05-22 RX ADMIN — LACTULOSE 30 G: 20 SOLUTION ORAL at 02:05

## 2023-05-22 RX ADMIN — LITHIUM CARBONATE 300 MG: 150 CAPSULE, GELATIN COATED ORAL at 09:05

## 2023-05-22 RX ADMIN — LACTULOSE 30 G: 20 SOLUTION ORAL at 11:05

## 2023-05-22 RX ADMIN — LITHIUM CARBONATE 300 MG: 150 CAPSULE, GELATIN COATED ORAL at 11:05

## 2023-05-22 RX ADMIN — RIFAXIMIN 550 MG: 550 TABLET ORAL at 11:05

## 2023-05-22 RX ADMIN — LACTULOSE 30 G: 20 SOLUTION ORAL at 09:05

## 2023-05-22 RX ADMIN — Medication 100 MG: at 09:05

## 2023-05-22 RX ADMIN — SODIUM CHLORIDE, POTASSIUM CHLORIDE, SODIUM LACTATE AND CALCIUM CHLORIDE: 600; 310; 30; 20 INJECTION, SOLUTION INTRAVENOUS at 01:05

## 2023-05-22 RX ADMIN — FOLIC ACID 1 MG: 1 TABLET ORAL at 09:05

## 2023-05-22 RX ADMIN — RIFAXIMIN 550 MG: 550 TABLET ORAL at 09:05

## 2023-05-22 RX ADMIN — CEFTRIAXONE 1 G: 1 INJECTION, POWDER, FOR SOLUTION INTRAMUSCULAR; INTRAVENOUS at 04:05

## 2023-05-22 RX ADMIN — MULTIVITAMIN 15 ML: LIQUID ORAL at 09:05

## 2023-05-22 RX ADMIN — DEXTROSE MONOHYDRATE: 50 INJECTION, SOLUTION INTRAVENOUS at 09:05

## 2023-05-22 RX ADMIN — LEVETIRACETAM 1000 MG: 100 SOLUTION ORAL at 09:05

## 2023-05-22 RX ADMIN — ZONISAMIDE 100 MG: 100 CAPSULE ORAL at 09:05

## 2023-05-22 RX ADMIN — LEVETIRACETAM 1000 MG: 100 SOLUTION ORAL at 11:05

## 2023-05-22 NOTE — PT/OT/SLP PROGRESS
Physical Therapy      Patient Name:  Marely Hamilton   MRN:  [id number]    [id number] Attempt. Patient not seen today secondary to Patient fatigue, Therapist assessment. Pt is confused and lethargic. Pt mumbling and unable to state name or place as well as answer other questions clearly. Flat affect noted. Pt demonstrated minimal command following and active participation during attempt for PT evaluation. Pt wiggled toes and performed L ankle DF with minimal active movement noted. Pt did not follow further motor commands and fell back asleep. Pt unable to maintain appropriate level of alertness and follow commands in order to actively participate in PT evaluation this date. Donned pressure relief boots and pillows in place for pressure relief and edema management. Nursing notified / aware of pt's cognitive status. Will follow-up as able / appropriate.    5/22/2023
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
## 2023-05-22 NOTE — PROGRESS NOTES
[facility] - Telemetry  Adult Nutrition  Progress Note    SUMMARY       Recommendations    Recommendations:   1. Initiate nutrition when medically able - consult RD for recs as needed. 2. Monitor NPO status, weight changes, and labs.   3. RD to follow.    Goals:   Initiate nutrition by RD follow up.  Nutrition Goal Status: progressing towards goal  Communication of RD Recs: other (comment) (Plan of care)    Assessment and Plan    Endocrine  Severe protein-calorie malnutrition  Malnutrition Type:  Context: Acute  Level: Severe    Related to (etiology):   Hepatic encephalopathy  Altered mental status    Signs and Symptoms (as evidenced by):   Wt loss of 28.55% x 3 months  Severe buccal, orbital, and temporal depletion    Malnutrition Characteristic Summary:  Wt loss of 28.55% x 3 months  Severe buccal, orbital, and temporal depletion    Interventions/Recommendations (treatment strategy):  1. Initiate nutrition when medically able - consult RD for recs as needed.   2. Monitor NPO status, weight changes, and labs.   3. RD to follow.    Nutrition Diagnosis Status:   Continues         Malnutrition Assessment    Orbital Region (Subcutaneous Fat Loss): severe depletion   Synagogue Region (Muscle Loss): severe depletion       Reason for Assessment    Reason For Assessment: RD follow-up  Diagnosis: liver disease, infection/sepsis  Relevant Medical History: Addiction to Drug, ETOH Abuse, Anemia, Anxiety, Behavioral Disorder, Bipolar Disorder, Cirrhosis, Depression, Hepatic Encephalopathy, Suicide Attempt, Psychosis, Self-Harming Behavior, Jeana  Interdisciplinary Rounds: did not attend  General Information Comments: Pt admitted 5/18/2023 with acute encephalopathy. Pt has been NPO since admit. Current wt 42.9 kg - increase of ~1 kg since 5/19/2023. NFPE completed 5/19/2023 - severe buccal, orbital, and temporal depletion. LBM 5/22/2023. Francisco 10 - no noted altered skin integrity.  Nutrition Discharge Planning: TBD    Patient Active  Problem List   Diagnosis    Cirrhosis, Laennec's    Thrombocytopenia    History of seizure    Glaucoma    hx of intentional Tylenol overdose    Alcohol abuse, in remission    AYESHA (acute kidney injury)    Macrocytic anemia    Chronic liver failure    Severe protein-calorie malnutrition    Hepatic encephalopathy    Bipolar 1 disorder, depressed, severe    Elevated LFTs    Bipolar 1 disorder    Bipolar disorder, manic    Urinary tract infection without hematuria    SVT (supraventricular tachycardia)    Alcoholic cirrhosis    Hepatic cirrhosis    Metabolic acidosis    Hyperammonemia    Hypernatremia    Hypercalcemia    Acute encephalopathy     Past Medical History:   Diagnosis Date    Addiction to drug     Alcohol abuse     Alcohol abuse, in remission 6/15/2015    14.5 weeks ago; AA weekly    Anemia     Anxiety 6/15/2015    Behavioral problem     Bipolar disorder     Bipolar disorder in remission 6/15/2015    Cirrhosis, Laennec's 6/15/2015    Depression     Encounter for blood transfusion     Epistaxis 6/15/2015    Fatigue     Glaucoma     Hematuria     Hepatic encephalopathy 6/15/2015    Hepatic enlargement     History of psychiatric care     History of psychiatric hospitalization     History of seizure 6/15/2015    1    hx of intentional Tylenol overdose 6/15/2015    2005- situational and hx of bipolar    Hx of psychiatric care     Macrocytic anemia 9/18/2015    6 units PRBC since June 2015    Jeana     Osteoarthritis     Other ascites 6/15/2015    Psychiatric exam requested by authority     Psychiatric problem     Psychosis 9/26/2019    Renal disorder     Seizures     Self-harming behavior     Suicide attempt     Therapy     Thrombocytopenia 6/15/2015       Nutrition Risk Screen    Nutrition Risk Screen: tube feeding or parenteral nutrition    Nutrition/Diet History    Food Preferences: No cultural or Confucianist preferences identified.  Spiritual, Cultural Beliefs, Zoroastrianism Practices, Values that Affect Care:  "no  Food Allergies: NKFA  Factors Affecting Nutritional Intake: NPO    Anthropometrics    Temp: 97.6 °F (36.4 °C)  Height Method: Stated (per prior medical record)  Height: 5' 2" (157.5 cm)  Height (inches): 62 in  Weight [facility]  Weight: 42.9 kg (94 lb 9.2 oz)  Weight (lb): 94.58 lb  Ideal Body Weight (IBW), Female: 110 lb  % Ideal Body Weight, Female (lb): 83.77 %  BMI (Calculated): 17.3  BMI Grade: 17 - 18.4 protein-energy malnutrition grade I  Weight Loss: unintentional  Usual Body Weight (UBW), k.5 kg  % Usual Body Weight: 71.6  % Weight Change From Usual Weight: -28.55 %     Wt Readings from Last 10 Encounters:   23 42.9 kg (94 lb 9.2 oz)   23 45.3 kg (99 lb 13.9 oz)   03/10/23 46.8 kg (103 lb 2.8 oz)   23 47.1 kg (103 lb 13.4 oz)   23 58.5 kg (129 lb)   23 58.9 kg (129 lb 13.6 oz)   21 58.9 kg (129 lb 13.6 oz)   21 59 kg (130 lb)   21 53.1 kg (117 lb)   20 54.3 kg (119 lb 11.4 oz)       Lab/Procedures/Meds    Pertinent Labs Reviewed: reviewed  Pertinent Labs Comments: Albumin 1.8, Vitamin K 6.88    Lab Results   Component Value Date/Time     2023 05:10 AM    K 4.0 2023 05:10 AM    EGFRNORACEVR >60 2023 05:10 AM    CALCIUM 11.9 (H) 2023 05:10 AM    PHOS 4.2 2023 03:27 PM    MG 2.7 (H) 2023 03:27 PM    ALBUMIN 1.8 (L) 2023 05:10 AM    TRIG 39 2020 08:01 AM    HGBA1C 4.1 2021 07:29 AM     BMP  Lab Results   Component Value Date     2023    K 4.0 2023     (H) 2023    CO2 22 (L) 2023    BUN 19 2023    CREATININE 0.9 2023    CALCIUM 11.9 (H) 2023    ANIONGAP 5 (L) 2023    EGFRNORACEVR >60 2023     Lab Results   Component Value Date    HGBA1C 4.1 2021     POCT Glucose   Date Value Ref Range Status   2023 125 (H) 70 - 110 mg/dL Final   2023 117 (H) 70 - 110 mg/dL Final   2023 91 70 - 110 " mg/dL Final   05/21/2023 91 70 - 110 mg/dL Final   05/21/2023 106 70 - 110 mg/dL Final   05/21/2023 104 70 - 110 mg/dL Final   05/21/2023 103 70 - 110 mg/dL Final   05/21/2023 156 (H) 70 - 110 mg/dL Final   05/20/2023 130 (H) 70 - 110 mg/dL Final   05/20/2023 182 (H) 70 - 110 mg/dL Final   05/20/2023 201 (H) 70 - 110 mg/dL Final   05/20/2023 185 (H) 70 - 110 mg/dL Final   05/20/2023 217 (H) 70 - 110 mg/dL Final       Recent Labs   Lab 05/22/23  0510   WBC 16.84*   RBC 3.18*   HGB 10.7*   HCT 33.0*   PLT 92*   *   MCH 33.6*   MCHC 32.4       Pertinent Medications Reviewed: reviewed  Pertinent Medications Comments: Ceftriaxone, Folic Acid, Insulin Regular 10 units, Lactulose, MVI with Iron, Rifaximin, Thiamine    Scheduled Meds:   cefTRIAXone (ROCEPHIN) IVPB  1 g Intravenous Q24H    folic acid  1 mg Per NG tube Daily    insulin regular  10 Units Intravenous Once    lactulose  30 g Per NG tube TID    levetiracetam  1,000 mg Per NG tube BID    lithium  300 mg Oral BID    multivit-min-ferrous gluconate  15 mL Per NG tube Daily    OLANZapine  5 mg Per NG tube QHS    propranoloL  20 mg Per NG tube Daily    QUEtiapine  100 mg Per NG tube QHS    rifAXIMin  550 mg Per NG tube BID    thiamine  100 mg Per NG tube Daily    zonisamide  100 mg Per NG tube Daily     Continuous Infusions:   dextrose 5 % (D5W) 75 mL/hr at 05/22/23 0939     PRN Meds:.melatonin, sodium chloride 0.9%    Estimated/Assessed Needs    Weight Used For Calorie Calculations: 42.9 kg (94 lb 9.2 oz)  Energy Calorie Requirements (kcal): 1930 (45 kcal/kg)  Energy Need Method: Kcal/kg  Protein Requirements: 73 g (1.7 g/kg)  Weight Used For Protein Calculations: 42.9 kg (94 lb 9.2 oz)  Fluid Requirements (mL): 1930  Estimated Fluid Requirement Method: RDA Method  RDA Method (mL): 1930       Nutrition Prescription Ordered    Current Diet Order: NPO    Evaluation of Received Nutrient/Fluid Intake    I/O: 1330.4/0    Intake/Output Summary (Last 24 hours) at  5/22/2023 1238  Last data filed at 5/22/2023 0825  Gross per 24 hour   Intake 1651.71 ml   Output 625 ml   Net 1026.71 ml       Energy Calories Required: not meeting needs  Protein Required: not meeting needs  Fluid Required: not meeting needs  % Intake of Estimated Energy Needs: 0 - 25 %  % Meal Intake: NPO    Nutrition Risk    Level of Risk/Frequency of Follow-up:  (2x/week)     Monitor and Evaluation    Food and Nutrient Intake: other (specify) (NPO)  Food and Nutrient Adminstration: other (specify) (NPO)  Anthropometric Measurements: weight, weight change, body mass index  Biochemical Data, Medical Tests and Procedures: electrolyte and renal panel, gastrointestinal profile, glucose/endocrine profile  Nutrition-Focused Physical Findings: overall appearance     Nutrition Follow-Up    RD Follow-up?: Yes

## 2023-05-22 NOTE — PROGRESS NOTES
[facility] Medicine Progress Note    Primary Team: [facility] Hospitalist Team A  Attending Physician: Sarita Faith MD  Resident: Jorge  Intern: Kendall    Subjective:      Patient was slightly more alert yesterday evening, opening eyes to voice. This morning she was able to carry on some conversation but still disoriented.      Objective:     Last 24 Hour Vital Signs:  BP  Min: 108/59  Max: 129/62  Temp  Av.7 °F (35.9 °C)  Min: 96 °F (35.6 °C)  Max: 98.3 °F (36.8 °C)  Pulse  Av.7  Min: 56  Max: 64  Resp  Av.4  Min: 15  Max: 20  SpO2  Av.5 %  Min: 93 %  Max: 99 %  I/O last 3 completed shifts:  In: 3471 [I.V.:3239.8; IV Piggyback:231.2]  Out: 625 [Other:625]    Physical Examination:  General: thin adult female laying in bed, awakens to light sternal rub  HEENT: NC/AT, NGT in place   Neck: Supple, trachea midline  CV: RRR, normal S1/S2, 2/6 JOE  Resp: CTAB, no wheezing or rhonchi appreciated, normal respiratory effort  Abd: Soft, NT/ND, normoactive BSx4  Extremities: 2+ pulses, bruising and edema in bilateral upper extremities   Skin: Warm, dry, intact, jaundice, facial spider angiomas  Neuro: somnolent, grunts to pain and sternal rub  Psych: unable to assess     Laboratory:  Recent Labs   Lab 23  0455 23  0854 23  1803 23  0407 23  0817 23  0510   WBC 9.18  --   --  17.55*  --  16.84*   HGB 9.7*  --   --  12.0  --  10.7*   HCT 31.6*  --   --  39.2  --  33.0*   PLT 93*  --   --  99*  --  92*   *  --   --  108*  --  104*   RDW 17.2*  --   --  17.6*  --  18.8*   NA  --    < > 145  --  144 145   K  --    < > 3.9  --  4.1 4.0   CL  --    < > 121*  --  117* 118*   CO2  --    < > 18*  --  24 22*   BUN  --    < > 23*  --  19 19   CREATININE  --    < > 0.9  --  0.7 0.9   GLU  --    < > 151*  --  145* 102   PROT  --    < > 5.2*  --  5.0* 4.9*   ALBUMIN  --    < > 2.0*  --  2.0* 1.8*   BILITOT  --    < > 2.7*  --  3.2* 2.6*   AST  --    < > 99*  --  97* 90*   ALKPHOS   --    < > 126  --  135 146*   ALT  --    < > 43  --  53* 48*    < > = values in this interval not displayed.       Urine culture growing Proteus mirabilis     Other Results:  EKG (my interpretation): NSR    Radiology Data Reviewed:   Pertinent Findings:  CT Head no acute intracranial findings    Current Medications:     Infusions:   lactated ringers 75 mL/hr at 05/22/23 0105        Scheduled:   cefTRIAXone (ROCEPHIN) IVPB  1 g Intravenous Q24H    folic acid  1 mg Per NG tube Daily    insulin regular  10 Units Intravenous Once    lactulose  30 g Per NG tube TID    levetiracetam  1,000 mg Per NG tube BID    lithium  300 mg Oral BID    multivit-min-ferrous gluconate  15 mL Per NG tube Daily    OLANZapine  5 mg Per NG tube QHS    propranoloL  20 mg Per NG tube Daily    QUEtiapine  100 mg Per NG tube QHS    rifAXIMin  550 mg Per NG tube BID    thiamine  100 mg Per NG tube Daily    zonisamide  100 mg Per NG tube Daily        PRN:  melatonin, sodium chloride 0.9%    Assessment:     Marely Hamilton is a 58 y.o.female w/ PMHx of EtOH induced cirrhosis, seizure on AEDs, and bipolar disorder who presented to [facility] on 5/18/2023 with a primary complaint of altered mental status for 1 day due to hepatic encephalopathy and UTI.      Plan:     Acute Encephalopathy due to hepatic encephalopathy   EtOh-induced Cirrhosis  Pt found altered by sitter (CNA) earlier on day of admission. She has a hx of hepatic encephalopathy 2/2 medication non-compliance. Pt found to have jaundice and scleral icterus on physical exam. Ammonia 139, Tbili 4.8 in ED.  Plan:  - Likely etiology of hepatic encephalopathy 2/2 medication non-compliance (especially given past hx and PE findings) vs UTI vs hypernatremia  - CTH with no acute abnormality   - Continue lactulose, rifaximine, thiamine, spironolactone, and multivitamin  - Keep NPO pending Speech eval and improvement in mentation  - NGT for lactulose administration, 1x rectal  lactulose      Proteus UTI  UA in ED w/ 3+ occult blood, 2.0-3.0 urobilinogen, 3+ leukocytes, 34 RBCs, >100 WBC.  Plan:  - Given CTX and Vanc in ED  - Repeat UA w/ cx d/t dirty catch on initial UA  - previous urine culture with >100K CFU Proteus mirabilis   - cultures growing Proteus Mirabilis   - continue cefepime      Hypernatremia  Na 155 on CMP in ED.  Plan:  - Give 1 L LR bolus, followed by LR infusion @ 75 mL/hr  - Goal Na decrease of 6-8 in first 24 hrs  - Will continue to monitor  - continue IVF with D5W     Elevated Troponin  Troponin mildly elevated to 0.037 in ED.  Plan:  - Likely d/t demand in setting of hypovolemia  - EKG did not demonstrate new abnormalities  - Troponin peaked, down trend to 0.035  - Continue to routinely monitor vitals     AYESHA, improving   Cr 1.1 on CMP in ED, baseline 0.6.  Plan:  - Likely pre-renal in etiology given hypovolemia  - continue IVF  - Renally dose meds     Seizure Disorder  Pt has hx of seizures and is currently on Keppra 1000 mg BID and Zonisamide 100 mg daily at home. No reported seizure for several years. Per chart review, seizures appear to have taken place in setting of EtOH withdrawal.  Plan:  - Continue home Keppra and Zonisamide  - Continue to monitor  - Recommend Neuro f/u at discharge     Bipolar Disorder  H/o Self Harm  Pt has bipolar disorder w/ h/o suicide attempt including intentional overdose w/ acetaminophen, self harm, and medication non-compliance.  Plan:  - Continue home Lithium, Seroquel, and Zyprexa  - Ensure ordered medications are administered  - Continue to closely monitor pt's behavior as encephalopathy improves  - lithium level low, likely non adherence consistent with collateral obtained from family      Hypercalcemia  Ca 11.9 on CMP in ED.  Plan:  - ionized Ca, PTH elevated   - continue IVF     Thrombocytopenia  Pt has known hx of thrombocytopenia. Plt 147 on CBC in ED.  Plan:  - due to cirrhosis  - no acute conern for bleeding, continue to  monitor      Healthcare Maintenance  Pt not UTD on vaccinations or preventative screenings.  Plan:  - F/u w/ PCP at discharge to discuss preventative care    Dispo: pending improvement in hepatic encephalopathy     Bridget Argueta MD  [facility] P[facility] PGY 1    [facility] Medicine Hospitalist Pager numbers:   [facility] Hospitalist Medicine Team A (Angela/Vianney): [phone]  [facility] Hospitalist Medicine Team B (Lyn/Edenilson):  [phone]

## 2023-05-22 NOTE — PLAN OF CARE
Problem: SLP  Goal: SLP Goal  Description: Short Term Goals:  1. Pt will participate in ongoing swallow eval to determine safest diet level.  2. Pt will participate in speech/lang eval to determine cognitive/communication baseline.   Outcome: Ongoing, Progressing   Consult for swallow eval received this date, rec: NPO for now. Pt with language of confusion and inconsistent command following. Will re-assess next date for PO advancement pending improvement in overall status.

## 2023-05-22 NOTE — PLAN OF CARE
Problem: Occupational Therapy  Goal: Occupational Therapy Goal  Description: Goals to be met by: 6/23/2023     Patient will increase functional independence with ADLs by performing:    Feeding with Set-up Assistance pending NG status and overarching ST ingestion recs.   UE Dressing with Minimal Assistance.  LE Dressing with Moderate Assistance.  Grooming while seated with Set-up Assistance.  Toileting from bedside commode with Stand-by Assistance for hygiene and clothing management.   Bathing seated UB sponge with Stand-by Assistance.  Toilet transfer to bedside commode with Minimal Assistance.  Increased functional strength to WFL as needed for recovery of effective use of UB as a stabilizer in routine ADL transfers.   Upper extremity exercise program x10 reps per handout, with assistance as needed.    Outcome: Underway,Ongoing

## 2023-05-22 NOTE — PT/OT/SLP EVAL
Speech Language Pathology Evaluation  Bedside Swallow    Patient Name:  Marely Hamilton   MRN:  [id number]  Admitting Diagnosis: Acute encephalopathy    Recommendations:                 General Recommendations:  ongoing swallow eval and monitor cognition and communication  Diet recommendations:  NPO, NPO   Aspiration Precautions: daily and frequent oral care by all staff   General Precautions: Standard, fall, aspiration, respiratory  Communication strategies:  yes/no questions, give time to respond, re-orient    Assessment:     Marely Hamilton is a 58 y.o. female admitted with acute encephalopathy with an SLP diagnosis of oral and pharyngeal dysphagia.  Pt w/ poor TRISTAN and poor command following which impacts overall safety with oral diet. SLP will continue with ongoing swallow assessment.     History per MD      Altered Mental Status (CNA called EMS for AMS, GCS 14 pt confused to situation, jaundice, pt denies any complaints)   for 1 day     Subjective:      History of Present Illness:  Marely Hamilton is a 58 y.o. female w/ PMHx of EtOH induced cirrhosis, seizure on AEDs, and bipolar disorder who presented to [facility] on 5/18/2023 with a primary complaint of altered mental status for 1 day.     The patient was in their usual state of health until earlier today when her sitter (CNA) found her to be altered. She is unable to give any pertinent hx d/t her altered status.     She has a hx of episodic non-compliance w/ home medications including lactulose and psych meds. At baseline, she is ambulatory and able to perform ADLs.         Past Medical History:   Diagnosis Date    Addiction to drug     Alcohol abuse     Alcohol abuse, in remission 6/15/2015    14.5 weeks ago; AA weekly    Anemia     Anxiety 6/15/2015    Behavioral problem     Bipolar disorder     Bipolar disorder in remission 6/15/2015    Cirrhosis, Laennec's 6/15/2015    Depression     Encounter for blood transfusion     Epistaxis  "6/15/2015    Fatigue     Glaucoma     Hematuria     Hepatic encephalopathy 6/15/2015    Hepatic enlargement     History of psychiatric care     History of psychiatric hospitalization     History of seizure 6/15/2015    1    hx of intentional Tylenol overdose 6/15/2015    2005- situational and hx of bipolar    Hx of psychiatric care     Macrocytic anemia 9/18/2015    6 units PRBC since June 2015    Jeana     Osteoarthritis     Other ascites 6/15/2015    Psychiatric exam requested by authority     Psychiatric problem     Psychosis 9/26/2019    Renal disorder     Seizures     Self-harming behavior     Suicide attempt     Therapy     Thrombocytopenia 6/15/2015       Past Surgical History:   Procedure Laterality Date    COSMETIC SURGERY      ESOPHAGOGASTRODUODENOSCOPY         Social History: Patient lives at home, per EMR, pt was ambulatory and able to perform ADLS. No family in room to corroborate history. Per chart, pt has a CNA (sitter) who takes care of her at least 7 days a week. Pt resides with her mom in the home but her elderly mother is ill.   Pt with psych hx and bi-polar disorder, t does take her medications and per her sister Zaria, pt can drive self and takes herself to appts.     Prior Intubation HX:  not on this admit    Modified Barium Swallow: none per EMR    CT: No acute intracranial findings.     Chest X-Rays: Mediastinal structures are midline.  Normal mediastinal and hilar contours.  Normal size cardiac silhouette.     Lungs are symmetrically expanded.  No focal consolidation.  No pneumothorax.  No significant pleural fluid.     Prior diet: unrestricted diet.    Occupation/hobbies/homemaking: unemployed.    Subjective     Consult received for clinical swallow eval this date, SLP did communicate with RN prior to eval/treat.    Patient goals: "do not call me tatyana."     Pain/Comfort:  Pain Rating 1:  (did not state pain level)    Respiratory Status: nasal cannula 2 liters   NGT in place "     Objective:   Upon entry into room, pt was found asleep, pt needed tactile cues to stimulate and awaken.   Pt with brief eye fluttering.   OT entered room and initiated cold rag and eval.   Pt was seated upright at 90 deg. Pt with significant head and neck weakness. Pt unable to turn head to midline or localize to her name when called.   Pt with language of confusion, not oriented to self, place or year.   Pt repeated last name only despite verbal cues.   Pt with poor attention to sustained tasks including OT and SLP.  Pt unable to answer basic or general personal questions. Pt began to speak in Macedonian (one phrase).    Oral Musculature Evaluation  Oral Musculature: unable to assess due to poor participation/comprehension  Dentition: present and adequate  Mucosal Quality: dry, coated tongue  Volitional Cough: elicited with max cues  Volitional Swallow: delayed swallow trigger  Voice Prior to PO Intake: low volume, mumbled speech at times, not wet    Bedside Swallow Eval: Pt required significant oral care and time by this clinician. Pt with thick, white secretions in back of oral cavity along back of tongue. Pt with poor awareness of secretions. Tongue was coated with thick, white secretions, lips cracked and dried pieces of skin on tongue which required scraping by SLP with green swab and water tinted with mint. Pt initially clenched jaw and required gentle pressing against lips to allow SLP to clean her teeth and tongue. Pt with facial grimacing during mouth care. SLP continued to educate pt on reason for oral care and importance of maintaining oral cavity free from bacteria. Pt with no confirmation of understanding information. NO verbal or vocal nod indicated by patient.     Consistencies Assessed:  Thin liquids ice chips x1, water by spoon, water by straw droplet  Puree 1/4 bite of applesauce x2 only      Oral Phase:   Decreased closure around utensil  Leakage, mouth breathing  Oral residue  Lingual  residue  Poor oral acceptance    Pharyngeal Phase:   decreased hyolaryngeal excursion to palpation  delayed swallow initation  throat clearing    Compensatory Strategies  Frequent and daily oral care by all staff      Treatment: Education provided to pt this date on role of SLP, NPO status including goals and reason for continued use of NGT.   Secure chat sent to MD, RN, and OT with above NPO recs.   Ongoing assessment of swallow eval next session.     Goals:   Multidisciplinary Problems       SLP Goals          Problem: SLP    Goal Priority Disciplines Outcome   SLP Goal     SLP Ongoing, Progressing   Description: Short Term Goals:  1. Pt will participate in ongoing swallow eval to determine safest diet level.  2. Pt will participate in speech/lang eval to determine cognitive/communication baseline.                        Plan:     Patient to be seen:  3 x/week   Plan of Care expires:  06/20/23  Plan of Care reviewed with:  patient   SLP Follow-Up:  Yes       Discharge recommendations:   (TBD)   Barriers to Discharge: nutrition and TRISTAN    Time Tracking:     SLP Treatment Date:   05/22/23  Speech Start Time:  1358  Speech Stop Time:  1418     Speech Total Time (min):  20 min      Billable Minutes: Eval Swallow and Oral Function 11 and Self Care/Home Management Training 9    05/22/2023

## 2023-05-22 NOTE — PROGRESS NOTES
NURSE PROACTIVE ROUNDING NOTE       Time of Visit:     Admit Date: 2023  LOS: 3  Code Status: Full Code   Date of Visit: 2023  : 1965  Age: 58 y.o.  Sex: female  Race: White  Bed: K485/K485 A:   MRN: [id number]  Was the patient discharged from an ICU this admission? No   Was the patient discharged from a PACU within last 24 hours? No   Did the patient receive conscious sedation/general anesthesia in last 24 hours? No   Was the patient in the ED within the past 24 hours? No   Was the patient on NIPPV within the past 24 hours? No   Attending Physician: Sarita Faith MD  Primary Service: [facility] HOSPITALIST TEAM A   Time spent at the bedside: < 15 min    SITUATION    Notified by previous RRN during handoff  Reason for alert: Proactive rounding    Diagnosis: Acute encephalopathy   has a past medical history of Addiction to drug, Alcohol abuse, Alcohol abuse, in remission, Anemia, Anxiety, Behavioral problem, Bipolar disorder, Bipolar disorder in remission, Cirrhosis, Laennec's, Depression, Encounter for blood transfusion, Epistaxis, Fatigue, Glaucoma, Hematuria, Hepatic encephalopathy, Hepatic enlargement, History of psychiatric care, History of psychiatric hospitalization, History of seizure, hx of intentional Tylenol overdose, psychiatric care, Macrocytic anemia, Jeana, Osteoarthritis, Other ascites, Psychiatric exam requested by authority, Psychiatric problem, Psychosis, Renal disorder, Seizures, Self-harming behavior, Suicide attempt, Therapy, and Thrombocytopenia.    Last Vitals:  Temp: 96.5 °F (35.8 °C) (1956)  Pulse: 64 (1956)  Resp: 16 (1956)  BP: 129/62 (1956)  SpO2: 97 % (1956)    24 Hour Vitals Range:  Temp:  [96 °F (35.6 °C)-98.4 °F (36.9 °C)]   Pulse:  [55-68]   Resp:  [15-20]   BP: (101-129)/(59-84)   SpO2:  [93 %-99 %]     Clinical Issues: Neuro    ASSESSMENT/INTERVENTIONS    Pt resting in bed. Vital signs reviewed and WNL. Labs  reviewed.    RECOMMENDATIONS  Place ng tube to gravity.     Discussed plan of care with charge RNAmy    PROVIDER ESCALATION    Physician escalation: No    Orders received and case discussed with NA.    Disposition:Remain in room 485    FOLLOW UP    Call back the Rapid Response NurseLopez at [phone] for additional questions or concerns.

## 2023-05-22 NOTE — NURSING
Per MD orders removed pt from intermittent NG tube suction at this time and pt placed on NG tube to gravity at this time; pt tolerates well; call light in reach; nursing continuing to monitor pt this shift.

## 2023-05-22 NOTE — PROGRESS NOTES
Recommendations:  Begin Isosource 1.5 progressing toward goal rate of 50 mL/hr with 250 mL water flush QID to provide 1800 kcal (93% assessed needs), 82 g protein (112% assessed needs), and 1916 mL fluid (99% assessed needs).  Monitor TF tolerance, weight changes, and labs.  RD to follow.

## 2023-05-22 NOTE — PLAN OF CARE
Recommendations    Recommendations:   1. Initiate nutrition when medically able - consult RD for recs as needed. 2. Monitor NPO status, weight changes, and labs.   3. RD to follow.    Goals:   Initiate nutrition by RD follow up.  Nutrition Goal Status: progressing towards goal  Communication of RD Recs: other (comment) (Plan of care)

## 2023-05-22 NOTE — PLAN OF CARE
SW attempted to meet with pt at bedside to discuss d/c planning with pt. Pt stated it was not a good time and asked sw to come back. Sw will follow back up. SW will continue to follow pt through transitions of care and assist with any discharge needs.    TAMMY Luna  [phone]     05/22/23 1600   Post-Acute Status   Coverage [employer]   Discharge Delays None known at this time   Discharge Plan   Discharge Plan A Home with family

## 2023-05-23 LAB
ALBUMIN SERPL BCP-MCNC: 1.8 G/DL (ref 3.5–5.2)
ALP SERPL-CCNC: 196 U/L (ref 55–135)
ALT SERPL W/O P-5'-P-CCNC: 54 U/L (ref 10–44)
ANION GAP SERPL CALC-SCNC: 3 MMOL/L (ref 8–16)
ANION GAP SERPL CALC-SCNC: 5 MMOL/L (ref 8–16)
AST SERPL-CCNC: 96 U/L (ref 10–40)
BASOPHILS # BLD AUTO: 0.02 K/UL (ref 0–0.2)
BASOPHILS NFR BLD: 0.1 % (ref 0–1.9)
BILIRUB SERPL-MCNC: 2.2 MG/DL (ref 0.1–1)
BUN SERPL-MCNC: 17 MG/DL (ref 6–20)
BUN SERPL-MCNC: 19 MG/DL (ref 6–20)
CALCIUM SERPL-MCNC: 11 MG/DL (ref 8.7–10.5)
CALCIUM SERPL-MCNC: 12.2 MG/DL (ref 8.7–10.5)
CHLORIDE SERPL-SCNC: 119 MMOL/L (ref 95–110)
CHLORIDE SERPL-SCNC: 121 MMOL/L (ref 95–110)
CO2 SERPL-SCNC: 21 MMOL/L (ref 23–29)
CO2 SERPL-SCNC: 25 MMOL/L (ref 23–29)
CREAT SERPL-MCNC: 0.9 MG/DL (ref 0.5–1.4)
CREAT SERPL-MCNC: 0.9 MG/DL (ref 0.5–1.4)
DIFFERENTIAL METHOD: ABNORMAL
EOSINOPHIL # BLD AUTO: 0.6 K/UL (ref 0–0.5)
EOSINOPHIL NFR BLD: 3.4 % (ref 0–8)
ERYTHROCYTE [DISTWIDTH] IN BLOOD BY AUTOMATED COUNT: 19.3 % (ref 11.5–14.5)
EST. GFR  (NO RACE VARIABLE): >60 ML/MIN/1.73 M^2
EST. GFR  (NO RACE VARIABLE): >60 ML/MIN/1.73 M^2
GLUCOSE SERPL-MCNC: 154 MG/DL (ref 70–110)
GLUCOSE SERPL-MCNC: 159 MG/DL (ref 70–110)
HCT VFR BLD AUTO: 34 % (ref 37–48.5)
HGB BLD-MCNC: 10.9 G/DL (ref 12–16)
IMM GRANULOCYTES # BLD AUTO: 0.09 K/UL (ref 0–0.04)
IMM GRANULOCYTES NFR BLD AUTO: 0.5 % (ref 0–0.5)
LITHIUM SERPL-SCNC: 1.4 MMOL/L (ref 0.6–1.2)
LYMPHOCYTES # BLD AUTO: 1.9 K/UL (ref 1–4.8)
LYMPHOCYTES NFR BLD: 11.5 % (ref 18–48)
MAGNESIUM SERPL-MCNC: 2.4 MG/DL (ref 1.6–2.6)
MCH RBC QN AUTO: 34 PG (ref 27–31)
MCHC RBC AUTO-ENTMCNC: 32.1 G/DL (ref 32–36)
MCV RBC AUTO: 106 FL (ref 82–98)
MONOCYTES # BLD AUTO: 1.1 K/UL (ref 0.3–1)
MONOCYTES NFR BLD: 6.8 % (ref 4–15)
NEUTROPHILS # BLD AUTO: 13.1 K/UL (ref 1.8–7.7)
NEUTROPHILS NFR BLD: 77.7 % (ref 38–73)
NRBC BLD-RTO: 0 /100 WBC
PHOSPHATE SERPL-MCNC: 1.9 MG/DL (ref 2.7–4.5)
PHOSPHATE SERPL-MCNC: 3.4 MG/DL (ref 2.7–4.5)
PLATELET # BLD AUTO: 114 K/UL (ref 150–450)
PMV BLD AUTO: 9.6 FL (ref 9.2–12.9)
POCT GLUCOSE: 126 MG/DL (ref 70–110)
POCT GLUCOSE: 153 MG/DL (ref 70–110)
POCT GLUCOSE: 154 MG/DL (ref 70–110)
POCT GLUCOSE: 170 MG/DL (ref 70–110)
POTASSIUM SERPL-SCNC: 3.4 MMOL/L (ref 3.5–5.1)
POTASSIUM SERPL-SCNC: 4.8 MMOL/L (ref 3.5–5.1)
PROT SERPL-MCNC: 4.8 G/DL (ref 6–8.4)
RBC # BLD AUTO: 3.21 M/UL (ref 4–5.4)
SODIUM SERPL-SCNC: 147 MMOL/L (ref 136–145)
SODIUM SERPL-SCNC: 147 MMOL/L (ref 136–145)
WBC # BLD AUTO: 16.87 K/UL (ref 3.9–12.7)

## 2023-05-23 PROCEDURE — 93010 EKG 12-LEAD: ICD-10-PCS | Mod: ,,, | Performed by: INTERNAL MEDICINE

## 2023-05-23 PROCEDURE — 94761 N-INVAS EAR/PLS OXIMETRY MLT: CPT

## 2023-05-23 PROCEDURE — 80178 ASSAY OF LITHIUM: CPT | Performed by: STUDENT IN AN ORGANIZED HEALTH CARE EDUCATION/TRAINING PROGRAM

## 2023-05-23 PROCEDURE — 99900035 HC TECH TIME PER 15 MIN (STAT)

## 2023-05-23 PROCEDURE — 84100 ASSAY OF PHOSPHORUS: CPT | Mod: 91

## 2023-05-23 PROCEDURE — 84100 ASSAY OF PHOSPHORUS: CPT | Performed by: STUDENT IN AN ORGANIZED HEALTH CARE EDUCATION/TRAINING PROGRAM

## 2023-05-23 PROCEDURE — 85025 COMPLETE CBC W/AUTO DIFF WBC: CPT | Performed by: STUDENT IN AN ORGANIZED HEALTH CARE EDUCATION/TRAINING PROGRAM

## 2023-05-23 PROCEDURE — 63600175 PHARM REV CODE 636 W HCPCS: Performed by: STUDENT IN AN ORGANIZED HEALTH CARE EDUCATION/TRAINING PROGRAM

## 2023-05-23 PROCEDURE — 25000003 PHARM REV CODE 250

## 2023-05-23 PROCEDURE — 63600175 PHARM REV CODE 636 W HCPCS

## 2023-05-23 PROCEDURE — 80053 COMPREHEN METABOLIC PANEL: CPT | Performed by: STUDENT IN AN ORGANIZED HEALTH CARE EDUCATION/TRAINING PROGRAM

## 2023-05-23 PROCEDURE — 83735 ASSAY OF MAGNESIUM: CPT | Performed by: STUDENT IN AN ORGANIZED HEALTH CARE EDUCATION/TRAINING PROGRAM

## 2023-05-23 PROCEDURE — 27000221 HC OXYGEN, UP TO 24 HOURS

## 2023-05-23 PROCEDURE — 21400001 HC TELEMETRY ROOM

## 2023-05-23 PROCEDURE — 80048 BASIC METABOLIC PNL TOTAL CA: CPT | Mod: XB | Performed by: STUDENT IN AN ORGANIZED HEALTH CARE EDUCATION/TRAINING PROGRAM

## 2023-05-23 PROCEDURE — 36415 COLL VENOUS BLD VENIPUNCTURE: CPT | Performed by: STUDENT IN AN ORGANIZED HEALTH CARE EDUCATION/TRAINING PROGRAM

## 2023-05-23 PROCEDURE — 93010 ELECTROCARDIOGRAM REPORT: CPT | Mod: ,,, | Performed by: INTERNAL MEDICINE

## 2023-05-23 PROCEDURE — 97535 SELF CARE MNGMENT TRAINING: CPT

## 2023-05-23 PROCEDURE — 92526 ORAL FUNCTION THERAPY: CPT

## 2023-05-23 PROCEDURE — 25000003 PHARM REV CODE 250: Performed by: INTERNAL MEDICINE

## 2023-05-23 PROCEDURE — 25000003 PHARM REV CODE 250: Performed by: STUDENT IN AN ORGANIZED HEALTH CARE EDUCATION/TRAINING PROGRAM

## 2023-05-23 PROCEDURE — 93005 ELECTROCARDIOGRAM TRACING: CPT

## 2023-05-23 RX ORDER — SODIUM CHLORIDE 9 MG/ML
INJECTION, SOLUTION INTRAVENOUS CONTINUOUS
Status: DISCONTINUED | OUTPATIENT
Start: 2023-05-23 | End: 2023-05-25

## 2023-05-23 RX ORDER — CALCITONIN SALMON 200 [USP'U]/ML
4 INJECTION, SOLUTION INTRAMUSCULAR; SUBCUTANEOUS ONCE
Status: COMPLETED | OUTPATIENT
Start: 2023-05-23 | End: 2023-05-23

## 2023-05-23 RX ORDER — ZOLEDRONIC ACID 4 MG/5ML
4 INJECTION INTRAVENOUS ONCE
Status: DISCONTINUED | OUTPATIENT
Start: 2023-05-23 | End: 2023-05-23

## 2023-05-23 RX ORDER — CALCITONIN SALMON 200 [USP'U]/ML
4 INJECTION, SOLUTION INTRAMUSCULAR; SUBCUTANEOUS ONCE
Status: COMPLETED | OUTPATIENT
Start: 2023-05-24 | End: 2023-05-24

## 2023-05-23 RX ORDER — ENOXAPARIN SODIUM 100 MG/ML
40 INJECTION SUBCUTANEOUS EVERY 24 HOURS
Status: DISCONTINUED | OUTPATIENT
Start: 2023-05-23 | End: 2023-05-23

## 2023-05-23 RX ADMIN — Medication 100 MG: at 09:05

## 2023-05-23 RX ADMIN — FOLIC ACID 1 MG: 1 TABLET ORAL at 09:05

## 2023-05-23 RX ADMIN — CALCITONIN SALMON 172 UNITS: 200 INJECTION, SOLUTION INTRAMUSCULAR; SUBCUTANEOUS at 01:05

## 2023-05-23 RX ADMIN — RIFAXIMIN 550 MG: 550 TABLET ORAL at 09:05

## 2023-05-23 RX ADMIN — LACTULOSE 30 G: 20 SOLUTION ORAL at 08:05

## 2023-05-23 RX ADMIN — SODIUM CHLORIDE: 0.9 INJECTION, SOLUTION INTRAVENOUS at 01:05

## 2023-05-23 RX ADMIN — LEVETIRACETAM 1000 MG: 100 SOLUTION ORAL at 09:05

## 2023-05-23 RX ADMIN — CEFTRIAXONE 1 G: 1 INJECTION, POWDER, FOR SOLUTION INTRAMUSCULAR; INTRAVENOUS at 05:05

## 2023-05-23 RX ADMIN — PROPRANOLOL HYDROCHLORIDE 20 MG: 10 TABLET ORAL at 09:05

## 2023-05-23 RX ADMIN — POTASSIUM PHOSPHATE, MONOBASIC AND POTASSIUM PHOSPHATE, DIBASIC 20 MMOL: 224; 236 INJECTION, SOLUTION, CONCENTRATE INTRAVENOUS at 01:05

## 2023-05-23 RX ADMIN — LEVETIRACETAM 1000 MG: 100 SOLUTION ORAL at 08:05

## 2023-05-23 RX ADMIN — LACTULOSE 30 G: 20 SOLUTION ORAL at 03:05

## 2023-05-23 RX ADMIN — ZOLEDRONIC ACID 4 MG: 4 INJECTION, SOLUTION, CONCENTRATE INTRAVENOUS at 01:05

## 2023-05-23 RX ADMIN — RIFAXIMIN 550 MG: 550 TABLET ORAL at 08:05

## 2023-05-23 RX ADMIN — SODIUM CHLORIDE: 0.9 INJECTION, SOLUTION INTRAVENOUS at 08:05

## 2023-05-23 RX ADMIN — ZONISAMIDE 100 MG: 100 CAPSULE ORAL at 09:05

## 2023-05-23 RX ADMIN — LACTULOSE 30 G: 20 SOLUTION ORAL at 09:05

## 2023-05-23 RX ADMIN — MULTIVITAMIN 15 ML: LIQUID ORAL at 09:05

## 2023-05-23 NOTE — PLAN OF CARE
Continue OT POC. Gross limitations throughout. With exception of slight tracking from midline to left, patient with static gaze throughout and no additional functional command following. Inconsistently directable to open mouth for dependent oral care. No active UE movement noted but tolerates without grimacing for gentle passive ROM. Note to follow. Recommendations pending medical progression and therapeutic functional response; consideration of palliative consult recommended as of performance on this date unless rapid resolvement occurs.    Problem: Occupational Therapy  Goal: Occupational Therapy Goal  Description: Goals to be met by: 6/23/2023     Patient will increase functional independence with ADLs by performing:    *new goal 5/23/2023: following one step simple command with 25% accuracy  Feeding with Set-up Assistance pending NG status and overarching ST ingestion recs.   UE Dressing with Minimal Assistance.  LE Dressing with Moderate Assistance.  Grooming while seated with Set-up Assistance.  Toileting from bedside commode with Stand-by Assistance for hygiene and clothing management.   Bathing seated UB sponge with Stand-by Assistance.  Toilet transfer to bedside commode with Minimal Assistance.  Increased functional strength to WFL as needed for recovery of effective use of UB as a stabilizer in routine ADL transfers.   Upper extremity exercise program x10 reps per handout, with assistance as needed.    5/23/2023 1552 by Letitia Meng OT  Outcome: Ongoing, limited progression

## 2023-05-23 NOTE — PROGRESS NOTES
[facility] Hospital Medicine Progress Note    Primary Team: [facility] Hospitalist Team A  Attending Physician: Sarita Faith MD  Resident: Jorge  Intern: Kendall    Subjective:      Patient was seen by therapies yesterday but too sleepy for much participation. This morning she is awake but not response to questions. Per nursing, she continues to have multiple bowel movements daily.      Objective:     Last 24 Hour Vital Signs:  BP  Min: 114/61  Max: 125/58  Temp  Av.1 °F (36.2 °C)  Min: 96.2 °F (35.7 °C)  Max: 98 °F (36.7 °C)  Pulse  Av  Min: 53  Max: 77  Resp  Av  Min: 18  Max: 18  SpO2  Av %  Min: 93 %  Max: 97 %  Weight  Av.9 kg (94 lb 9.2 oz)  Min: 42.9 kg (94 lb 9.2 oz)  Max: 42.9 kg (94 lb 9.2 oz)  I/O last 3 completed shifts:  In: 3154.4 [I.V.:1284.6; NG/GT:1775; IV Piggyback:94.9]  Out: 0     Physical Examination:  General: thin adult female laying in bed, eyes open, staring and unresponsive to questioning   HEENT: NC/AT, NGT in place   Neck: Supple, trachea midline  CV: systolic murmur throughout precordium, normal S1/S2, 2/6 JOE  Resp: CTAB, no wheezing or rhonchi appreciated, normal respiratory effort  Abd: Soft, NT/ND, normoactive BSx4  Extremities: 2+ pulses, bruising and edema in bilateral upper extremities   Skin: Warm, dry, intact, facial spider angiomas  Neuro: awake, fixed gaze, mumbles intermittently   Psych: unable to assess     Laboratory:  Recent Labs   Lab 23  0455 23  0854 23  1803 23  0407 23  0817 23  0510   WBC 9.18  --   --  17.55*  --  16.84*   HGB 9.7*  --   --  12.0  --  10.7*   HCT 31.6*  --   --  39.2  --  33.0*   PLT 93*  --   --  99*  --  92*   *  --   --  108*  --  104*   RDW 17.2*  --   --  17.6*  --  18.8*   NA  --    < > 145  --  144 145   K  --    < > 3.9  --  4.1 4.0   CL  --    < > 121*  --  117* 118*   CO2  --    < > 18*  --  24 22*   BUN  --    < > 23*  --  19 19   CREATININE  --    < > 0.9  --  0.7 0.9   GLU  --     < > 151*  --  145* 102   PROT  --    < > 5.2*  --  5.0* 4.9*   ALBUMIN  --    < > 2.0*  --  2.0* 1.8*   BILITOT  --    < > 2.7*  --  3.2* 2.6*   AST  --    < > 99*  --  97* 90*   ALKPHOS  --    < > 126  --  135 146*   ALT  --    < > 43  --  53* 48*    < > = values in this interval not displayed.     Urine culture growing Proteus mirabilis     Other Results:  EKG (my interpretation): NSR    Radiology Data Reviewed:   Pertinent Findings:  CT Head no acute intracranial findings    CXR 5/22  Subsegmental atelectasis or airspace disease left lung base. Cholelithiasis.    Current Medications:     Infusions:         Scheduled:   cefTRIAXone (ROCEPHIN) IVPB  1 g Intravenous Q24H    folic acid  1 mg Per NG tube Daily    insulin regular  10 Units Intravenous Once    lactulose  30 g Per NG tube TID    levetiracetam  1,000 mg Per NG tube BID    lithium  300 mg Oral BID    multivit-min-ferrous gluconate  15 mL Per NG tube Daily    OLANZapine  5 mg Per NG tube QHS    propranoloL  20 mg Per NG tube Daily    QUEtiapine  100 mg Per NG tube QHS    rifAXIMin  550 mg Per NG tube BID    thiamine  100 mg Per NG tube Daily    zonisamide  100 mg Per NG tube Daily        PRN:  melatonin, sodium chloride 0.9%    Assessment:     Marely Hamilton is a 58 y.o.female w/ PMHx of EtOH induced cirrhosis, seizure on AEDs, and bipolar disorder who presented to [facility] on 5/18/2023 with a primary complaint of altered mental status for 1 day due to hepatic encephalopathy and UTI.      Plan:     Moderate to Severe Hypercalcemia  Ca 11.9 on CMP in ED.  - ionized Ca elevated at 1.6, PTH 84  - calcium increased to 14 when corrected for hypoalbuminemia   - likely multifactorial due to lithium and dehydration, may be contributing to her encephalopathy  - discontinue lithium and multivitamin   - start NS at 150 mL/hr, IM calcitonin, IV zolendronate  - recheck BMP at 6pm    Acute Encephalopathy due to hepatic encephalopathy and  hypercalcemia  EtOh-induced Cirrhosis  Pt found altered by sitter (CNA) earlier on day of admission. She has a hx of hepatic encephalopathy 2/2 medication non-compliance. Pt found to have jaundice and scleral icterus on physical exam. Ammonia 139, Tbili 4.8 in ED.  Plan:  - Likely etiology of hepatic encephalopathy 2/2 medication non-compliance (especially given past hx and PE findings) vs UTI vs hypernatremia  - CTH with no acute abnormality   - Continue lactulose, rifaximine, thiamine, and multivitamin  - Keep NPO pending Speech eval and improvement in mentation; NGT feeds held due to calcium content   - NGT for lactulose administration    Hypophosphatemia  - phos 1.9  - replaced with K Phos 20 mmol   - recheck phos 6pm     Proteus UTI  UA in ED w/ 3+ occult blood, 2.0-3.0 urobilinogen, 3+ leukocytes, 34 RBCs, >100 WBC.  Plan:  - Given CTX and Vanc in ED  - Repeat UA w/ cx d/t dirty catch on initial UA  - previous urine culture with >100K CFU Proteus mirabilis   - cultures growing Proteus Mirabilis   - cefepime narrowed to ceftriaxone     Hypernatremia  Na 155 on CMP in ED.  Plan:  - Give 1 L LR bolus, followed by LR infusion @ 75 mL/hr  - Goal Na decrease of 6-8 in first 24 hrs  - Will continue to monitor  - continue IVF with D5W    Leukocytosis  - WBC 13 on admit, increased to 17.55  - patient afebrile, undergoing tx for UTI  - CXR without new consolidation concerning for pneumonia      Elevated Troponin  Troponin mildly elevated to 0.037 in ED.  Plan:  - Likely d/t demand in setting of hypovolemia  - EKG did not demonstrate new abnormalities  - Troponin peaked, down trend to 0.035  - Continue to routinely monitor vitals     AYESHA, improving   Cr 1.1 on CMP in ED, baseline 0.6.  Plan:  - Likely pre-renal in etiology given hypovolemia  - continue IVF  - Renally dose meds     Seizure Disorder  Pt has hx of seizures and is currently on Keppra 1000 mg BID and Zonisamide 100 mg daily at home. No reported seizure for  several years. Per chart review, seizures appear to have taken place in setting of EtOH withdrawal.  Plan:  - Continue home Keppra and Zonisamide  - Continue to monitor  - Recommend Neuro f/u at discharge     Bipolar Disorder  H/o Self Harm  Pt has bipolar disorder w/ h/o suicide attempt including intentional overdose w/ acetaminophen, self harm, and medication non-compliance.  Plan:  - Lithium held due to hypercalcemia   , Seroquel, and Zyprexa; held seroquel and zyprexa due to somnolence   - Ensure ordered medications are administered  - Continue to closely monitor pt's behavior as encephalopathy improves  - lithium level low, likely non adherence consistent with collateral obtained from family      Thrombocytopenia  Pt has known hx of thrombocytopenia. Plt 147 on CBC in ED.  Plan:  - due to cirrhosis  - no acute conern for bleeding, continue to monitor      Healthcare Maintenance  Pt not UTD on vaccinations or preventative screenings.  Plan:  - F/u w/ PCP at discharge to discuss preventative care    Dispo: pending improvement in hepatic encephalopathy     Bridget Argueta MD  [facility] Med Peds PGY 1    [facility] Medicine Hospitalist Pager numbers:   [facility] Hospitalist Medicine Team A (Angela/Vianney): [phone]  [facility] Hospitalist Medicine Team B (Lyn/Edenilson):  [phone]

## 2023-05-23 NOTE — PT/OT/SLP PROGRESS
Occupational Therapy   Treatment    Name: Marely Hamilton  MRN: [id number]  Admitting Diagnosis:  Acute encephalopathy       Recommendations:     Discharge Recommendations: other (see comments) (low intensity therapy pending progression medically; consideration of palliative consult recommended)  Discharge Equipment Recommendations:  shower chair  Barriers to discharge:  limited tolerance/participation/ medical stability     Assessment:     Marely Hamilton is a 58 y.o. female with a medical diagnosis of Acute encephalopathy.  She presents with ..The primary encounter diagnosis was Hepatic encephalopathy. Diagnoses of Alcoholic cirrhosis, SVT (supraventricular tachycardia), Metabolic acidosis, Hepatic cirrhosis, Hepatic cirrhosis, unspecified hepatic cirrhosis type, unspecified whether ascites present, AMS (altered mental status), Hyperammonemia, Urinary tract infection without hematuria, site unspecified, Hypernatremia, Hypercalcemia, AYESHA (acute kidney injury), Hyperkalemia, Acute encephalopathy, Macrocytic anemia, Thrombocytopenia, Alcoholic cirrhosis, unspecified whether ascites present, and Bipolar 1 disorder were also pertinent to this visit.  . Performance deficits affecting function are weakness, visual deficits, impaired endurance, impaired cognition, decreased ROM, impaired sensation, decreased coordination, impaired self care skills, impaired functional mobility, gait instability, impaired balance, pain, decreased safety awareness, decreased upper extremity function, decreased lower extremity function, impaired skin, impaired muscle length.     Continue OT POC. Gross limitations throughout. With exception of slight tracking from midline to left, patient with static gaze throughout and no additional functional command following. Inconsistently directable to open mouth for dependent oral care. No active UE movement noted but tolerates without grimacing for gentle passive ROM. Note to follow. Recommendations  pending medical progression and therapeutic functional response; consideration of palliative consult recommended as of performance on this date unless rapid resolvement occurs.    Rehab Prognosis:  Fair; patient would benefit from acute skilled OT services to address these deficits and reach maximum level of function.       Plan:     Patient to be seen 3 x/week to address the above listed problems via self-care/home management, therapeutic activities, therapeutic exercises, neuromuscular re-education  Plan of Care Expires: 06/23/23  Plan of Care Reviewed with: patient    Subjective     Chief Complaint: static gaze; no verbal or gestural complaints  Patient/Family Comments/goals: no verbalizations noted  Pain/Comfort:  Pain Rating 1: other (see comments) (unable to state; presents with gaze forward)  Pain Addressed 1: Pre-medicate for activity, Distraction, Cessation of Activity  Pain Rating Post-Intervention 1: other (see comments) (no change)    Objective:     Communicated with: nursing prior to session.  Patient found HOB elevated with bed alarm, telemetry, NG tube, peripheral IV upon OT entry to room.    General Precautions: Standard, fall, aspiration, NPO    Orthopedic Precautions:N/A  Braces: N/A  Respiratory Status: Nasal cannula, flow 1 L/min     Occupational Performance:     Functional Mobility/Transfers:  Deferred due to poor command following    Activities of Daily Living:  Grooming: dependence ; oral care; ~10% response to opening mouth with tactile cues/manual cues. Dry mouth noted with secretions on tongue. Total assist for application of oral swab. Increased time with rest breaks provided.       [facility] 6 Click ADL: 6    Treatment & Education:  Patient without scanning at therapist's entry. Patient with slight eyes widening to verbalized first name. No scanning to right of midline and slight scanning to left of midline only. No verbalizations. Dependent preparation brief and minimal ROM provided to MEGHAN  (distally) with resistance noted in supination. Grooming performed as above with total assist.     Patient left HOB elevated with all lines intact, call button in reach, bed alarm on, and nursing notified    GOALS:   Multidisciplinary Problems       Occupational Therapy Goals          Problem: Occupational Therapy    Goal Priority Disciplines Outcome Interventions   Occupational Therapy Goal     OT, PT/OT Ongoing, Progressing    Description: Goals to be met by: 6/23/2023     Patient will increase functional independence with ADLs by performing:    *new goal 5/23/2023: following one step simple command with 25% accuracy  Feeding with Set-up Assistance pending NG status and overarching ST ingestion recs.   UE Dressing with Minimal Assistance.  LE Dressing with Moderate Assistance.  Grooming while seated with Set-up Assistance.  Toileting from bedside commode with Stand-by Assistance for hygiene and clothing management.   Bathing seated UB sponge with Stand-by Assistance.  Toilet transfer to bedside commode with Minimal Assistance.  Increased functional strength to WFL as needed for recovery of effective use of UB as a stabilizer in routine ADL transfers.   Upper extremity exercise program x10 reps per handout, with assistance as needed.                         Time Tracking:     OT Date of Treatment: 05/23/23  OT Start Time: 1522  OT Stop Time: 1542  OT Total Time (min): 20 min    Billable Minutes:Self Care/Home Management 20 min    OT/DC: OT          5/23/2023

## 2023-05-23 NOTE — PT/OT/SLP PROGRESS
Speech Language Pathology Treatment    Patient Name:  Marely Hamilton   MRN:  [id number]  Admitting Diagnosis: Acute encephalopathy    Recommendations:                  General Recommendations:  ongoing swallow eval, assess cognition/communication status is ongoing.   Rec: inpatient NEURO consult with imaging to determine etiology of impaired cognition/communication and new onset dysphagia  Diet recommendations:  NPO, NPO - continue with non oral means of feeding for now via NGT  Aspiration Precautions: daily and frequent oral care by all staff   General Precautions: Standard, fall, aspiration, respiratory  Communication strategies:  yes/no questions, give time to respond, re-orient  Assessment:     Marely Hamilton is a 58 y.o. female admitted with acute encephalopathy with an SLP diagnosis of impaired swallowing function which is adversely compounded by TRISTAN, poor cognitive status and impaired ability to communicate wants and needs. Pt with poor command following and blank stare this date.       Subjective     Pt found in room, RN reported no change in overall status.   Patient goals: none per patient     Pain/Comfort:  Pain Rating 1:  (unable to state due to TRISTAN and poor command following)    Respiratory Status: NC, NGT in place as well     Objective:     Has the patient been evaluated by SLP for swallowing?   Yes  Keep patient NPO? Yes       Swallowing/Oral care: pt found in room, eyes closed, pt was upright in bed and need tactile cues to arouse and open eyes. Patient did open eyes and made no attempt to express self to SLP. Pt with open mouth posture and breathing. Pt did allow SLP to briefly initiate oral care with green toothettes and mixed with mint flavored water. Pt with facial grimacing when toothette placed in oral cavity. Pt guarding mouth with tongue placed forward as to prevent foreign object in mouth. SLP felt patient's resistance and had to terminate oral care at this time. Per subjective assessment,  pt continues with gross sign of dehydration and thick secretion back on midline of tongue. RN is aware of daily oral care as pt allows.   SLP did speak with Dr. Novoa in person, pt will maintain NPO status and continue with NGT for now.   Neuro consult has been placed this date.   SLP will follow up next date of service for further POC and modify goals as needed.       Goals:   Multidisciplinary Problems       SLP Goals          Problem: SLP    Goal Priority Disciplines Outcome   SLP Goal     SLP Ongoing, Progressing   Description: Short Term Goals:  1. Pt will participate in ongoing swallow eval to determine safest diet level.  2. Pt will participate in speech/lang eval to determine cognitive/communication baseline.                        Plan:     Patient to be seen:  3 x/week   Plan of Care expires:  06/20/23  Plan of Care reviewed with:  patient   SLP Follow-Up:  Yes       Discharge recommendations:   (pending improvement in AMS, low intensity of therapy at this time)   Barriers to Discharge:  nutrition/impaired cognition    Time Tracking:     SLP Treatment Date:   05/23/23  Speech Start Time:  1201  Speech Stop Time:  1211     Speech Total Time (min):  10 min    Billable Minutes: Treatment Swallowing Dysfunction 10    05/23/2023

## 2023-05-23 NOTE — PLAN OF CARE
SW sent SNF referrals via [employer]. SW will continue to follow pt through transitions of care and assist with any discharge needs.    TAMMY Luna  [phone]     05/23/23 1003   Post-Acute Status   Post-Acute Authorization Placement   Post-Acute Placement Status Referrals Sent   Coverage [employer]   Discharge Delays None known at this time   Discharge Plan   Discharge Plan A Skilled Nursing Facility

## 2023-05-23 NOTE — CONSULTS
"NEUROLOGY FLOOR CONSULT    Reason for consult:  encephalopathy    CC:  "Altered mental status"    HPI:   Marely Hamilton is a 58 y.o. w/ Hx of hepatic cirrhosis, epilepsy, bipolar disorder who presented with altered mental status, thought likely due to hepatic encephalopathy and a UTI that was discovered upon admission. With treatment of her elevated ammonia and infection, her mental status seemed to improve before declining again today. Previously she was disoriented but able to converse, but today she does not speak and does not track with her eyes. Neurology was consulted for her decline in mental status.   At baseline, she is independent in her ADLs and has no significant neurologic deficits.         ROS: As per HPI    Histories:     Allergies:  Sulfa (sulfonamide antibiotics) and Codeine    Current Medications:    Current Facility-Administered Medications   Medication Dose Route Frequency Provider Last Rate Last Admin    0.9%  NaCl infusion   Intravenous Continuous Flavio Patel  mL/hr at 05/23/23 1305 New Bag at 05/23/23 1305    calcitonin injection 172 Units  4 Units/kg Intramuscular Once Flavio Patel MD        cefTRIAXone (ROCEPHIN) 1 g in dextrose 5 % in water (D5W) 5 % 50 mL IVPB (MB+)  1 g Intravenous Q24H Flavio Patel MD        folic acid tablet 1 mg  1 mg Per NG tube Daily Sarita Faith MD   1 mg at 05/23/23 0942    lactulose 20 gram/30 mL solution Soln 30 g  30 g Per NG tube TID Sarita Faith MD   30 g at 05/23/23 0943    levetiracetam 500 mg/5 mL (5 mL) liquid Soln 1,000 mg  1,000 mg Per NG tube BID Sarita Faith MD   1,000 mg at 05/23/23 0942    melatonin tablet 6 mg  6 mg Per NG tube Nightly PRN Sarita Faith MD        OLANZapine tablet 5 mg  5 mg Per NG tube QHS Sarita Faith MD   5 mg at 05/19/23 2136    potassium phosphate 20 mmol in dextrose 5 % (D5W) 500 mL infusion  20 mmol Intravenous Once Bridget Hogue MD        propranoloL tablet 20 mg  20 mg Per NG tube Daily Sarita " MD Vianney   20 mg at 05/23/23 0942    QUEtiapine tablet 100 mg  100 mg Per NG tube QHS Sarita Faith MD   100 mg at 05/19/23 2136    rifAXIMin tablet 550 mg  550 mg Per NG tube BID Sarita Faith MD   550 mg at 05/23/23 0942    sodium chloride 0.9% flush 10 mL  10 mL Intravenous PRN Yung Calvillo MD        thiamine tablet 100 mg  100 mg Per NG tube Daily Sarita Faith MD   100 mg at 05/23/23 0942    zoledronic acid (ZOMETA) 4 mg in sodium chloride 0.9% 100 mL IVPB  4 mg Intravenous Once Bridget Hogue MD        zonisamide capsule 100 mg  100 mg Per NG tube Daily Sarita Faith MD   100 mg at 05/23/23 0942       Past Medical/Surgical/Family History:  Medical:   Past Medical History:   Diagnosis Date    Addiction to drug     Alcohol abuse     Alcohol abuse, in remission 6/15/2015    14.5 weeks ago; AA weekly    Anemia     Anxiety 6/15/2015    Behavioral problem     Bipolar disorder     Bipolar disorder in remission 6/15/2015    Cirrhosis, Laennec's 6/15/2015    Depression     Encounter for blood transfusion     Epistaxis 6/15/2015    Fatigue     Glaucoma     Hematuria     Hepatic encephalopathy 6/15/2015    Hepatic enlargement     History of psychiatric care     History of psychiatric hospitalization     History of seizure 6/15/2015    1    hx of intentional Tylenol overdose 6/15/2015    2005- situational and hx of bipolar    Hx of psychiatric care     Macrocytic anemia 9/18/2015    6 units PRBC since June 2015    Jeana     Osteoarthritis     Other ascites 6/15/2015    Psychiatric exam requested by authority     Psychiatric problem     Psychosis 9/26/2019    Renal disorder     Seizures     Self-harming behavior     Suicide attempt     Therapy     Thrombocytopenia 6/15/2015      Surgeries:   Past Surgical History:   Procedure Laterality Date    COSMETIC SURGERY      ESOPHAGOGASTRODUODENOSCOPY        Family:   Family History   Problem Relation Age of Onset    Alcohol abuse Father     Suicide Father      Bipolar disorder Father     Alcohol abuse Sister     Hypertension Mother     Alcohol abuse Paternal Grandfather     Drug abuse Neg Hx     Dementia Neg Hx    ,     Social History:    Substance Abuse/Dependence History:  Tobacco: denied  EtOH:  prior heavy alcohol use  Ilicits: denies    Occupational/Employment History:  Occupation: unknown occupation      Current Evaluation:     Vital Signs:   Vitals:    05/23/23 1155   BP:    Pulse: 68   Resp:    Temp:         Neurological Examination   Orientation  GCS [id number]  Memory  Unable to assess  Language  Unable to assess  Cranial Nerves  PERRL, oculocephalic reflexes intact  symmetric facial expression,  tongue midline, symmetric palate elevation.  Motor  Normal Bulk  Normal Tone  Localizes to pain in all extremities   Sensory  Localizes to pain in all extremities   DTR   +2 symmetric  Cerebellar/Gait  Unable to asses    LABORATORY STUDIES:  Component Ref Range & Units 09:29  (5/23/23) 1 d ago  (5/22/23) 2 d ago  (5/21/23) 3 d ago  (5/20/23) 3 d ago  (5/20/23) 4 d ago  (5/19/23) 4 d ago  (5/19/23) 4 d ago  (5/19/23)   Sodium 136 - 145 mmol/L 147 High   145  144  145  147 High   152 High   151 High   152 High     Potassium 3.5 - 5.1 mmol/L 3.4 Low   4.0  4.1  3.9  3.3 Low   4.6  4.9  5.0    Chloride 95 - 110 mmol/L 119 High   118 High   117 High   121 High   121 High   126 High   121 High   121 High     CO2 23 - 29 mmol/L 25  22 Low   24  18 Low   23  20 Low   22 Low   24    Glucose 70 - 110 mg/dL 154 High   102  145 High   151 High   183 High   213 High   176 High   179 High     BUN 6 - 20 mg/dL 19  19  19  23 High   29 High   45 High   60 High   61 High     Creatinine 0.5 - 1.4 mg/dL 0.9  0.9  0.7  0.9  0.8  0.8  0.9  0.9    Calcium 8.7 - 10.5 mg/dL 12.2 High Panic   11.9 High   11.7 High   11.5 High   11.3 High   11.1 High   11.8 High   11.9 High     Comment: Calcium critical result(s) called and verbal readback obtained from     Charu DELUNA RN. at 11:48 on 05/23/2023 by  [facility] 05/23/2023 11:48    Total Protein 6.0 - 8.4 g/dL 4.8 Low   4.9 Low   5.0 Low   5.2 Low   4.7 Low   4.6 Low   5.2 Low      Albumin 3.5 - 5.2 g/dL 1.8 Low   1.8 Low   2.0 Low   2.0 Low   1.9 Low   1.8 Low   2.0 Low      Total Bilirubin 0.1 - 1.0 mg/dL 2.2 High   2.6 High  CM  3.2 High  CM  2.7 High  CM  2.7 High  CM  2.8 High  CM  3.8 High  CM     Comment: For infants and newborns, interpretation of results should be based   on gestational age, weight and in agreement with clinical   observations.     Premature Infant recommended reference ranges:   Up to 24 hours.............<8.0 mg/dL   Up to 48 hours............<12.0 mg/dL   3-5 days..................<15.0 mg/dL   6-29 days.................<15.0 mg/dL    Alkaline Phosphatase 55 - 135 U/L 196 High   146 High   135  126  109  108  106     AST 10 - 40 U/L 96 High   90 High  CM  97 High   99 High  CM  55 High   61 High  CM  55 High      ALT 10 - 44 U/L 54 High   48 High   53 High   43  32  23  25     Anion Gap 8 - 16 mmol/L 3 Low   5 Low   3 Low   6 Low   3 Low   6 Low   8  7 Low     eGFR >60 mL/min/1.73 m^2 >60  >60  >60  >60  >60  >60  >60       Component Ref Range & Units 2 d ago  (5/21/23) 5 d ago  (5/18/23) 2 wk ago  (5/5/23) 2 mo ago  (3/9/23) 2 mo ago  (2/27/23) 3 mo ago  (2/16/23) 3 mo ago  (2/11/23)   Ammonia 10 - 50 umol/L 55 High   139 High   67 High   55 High   41        RADIOLOGY STUDIES:  I have personally reviewed the images performed.     HEAD CT:  Impression:     No acute intracranial findings.     No significant change relative to prior head CT 05/05/2023          Assessment:  P     Marely Hamilton is a 58 y.o. w/ Hx of  hepatic cirrhosis, epilepsy, bipolar disorder who presents with acute encephalopathy. Encephalopathy is multifactorial from cirrhosis and UTI, and most recent decline is likely secondary to severe hypercalcemia.     -Treatment of hypercalcemia per primary team   -Continue treatment of UTI with ceftriaxone  -Continue lactulose and  rifaximin for treatment of hepatic encephalopathy  -Continue home doses of Keppra and Zonegran        Case discussed with Dr. Mauricio  Will continue to follow.    Gaetano Briones MD  [employer] [facility], PGY-III

## 2023-05-23 NOTE — PLAN OF CARE
ELSI placed call to [facility] to call in LOCET (Level of Care Eligibility Tool)   SW faxed PASRR (Level 1Pre-Admission Screening and Resident Review)  to [facility].   ELSI to await 142 for placement.     TAMMY Luna  [phone]       05/23/23 1024   Post-Acute Status   Post-Acute Authorization Placement   Post-Acute Placement Status Referrals Sent   Coverage [employer]   Discharge Delays None known at this time   Discharge Plan   Discharge Plan A Skilled Nursing Facility

## 2023-05-23 NOTE — PT/OT/SLP PROGRESS
Physical Therapy      Patient Name:  Marely Hamilton   MRN:  [id number]    [id number] Attempt. Patient not seen today secondary to Other (Comment) (Pt is unable to actively participate in therapy evaluation this date). Pt awake but presents with a blank stare and does not answer questions or converse. Neurologist present in room and notified of pt able to speak with mumbled speech yesterday. Pt presenting with no active or purposeful movement. Neurologist in the room to assess pt. Will follow-up as able/appropriate.    5/23/2023

## 2023-05-24 PROBLEM — R68.89 LOW BODY TEMPERATURE: Status: ACTIVE | Noted: 2023-05-24

## 2023-05-24 PROBLEM — R73.9 HYPERGLYCEMIA: Status: ACTIVE | Noted: 2023-05-24

## 2023-05-24 LAB
ALBUMIN SERPL BCP-MCNC: 1.6 G/DL (ref 3.5–5.2)
ALP SERPL-CCNC: 162 U/L (ref 55–135)
ALT SERPL W/O P-5'-P-CCNC: 55 U/L (ref 10–44)
ANION GAP SERPL CALC-SCNC: 1 MMOL/L (ref 8–16)
AST SERPL-CCNC: 87 U/L (ref 10–40)
BACTERIA BLD CULT: NORMAL
BACTERIA BLD CULT: NORMAL
BASOPHILS # BLD AUTO: 0.01 K/UL (ref 0–0.2)
BASOPHILS NFR BLD: 0.1 % (ref 0–1.9)
BILIRUB SERPL-MCNC: 2.5 MG/DL (ref 0.1–1)
BUN SERPL-MCNC: 16 MG/DL (ref 6–20)
CA-I BLDV-SCNC: 1.52 MMOL/L (ref 1.06–1.42)
CALCIUM SERPL-MCNC: 9.8 MG/DL (ref 8.7–10.5)
CHLORIDE SERPL-SCNC: 125 MMOL/L (ref 95–110)
CO2 SERPL-SCNC: 23 MMOL/L (ref 23–29)
CREAT SERPL-MCNC: 0.6 MG/DL (ref 0.5–1.4)
DIFFERENTIAL METHOD: ABNORMAL
EOSINOPHIL # BLD AUTO: 0.3 K/UL (ref 0–0.5)
EOSINOPHIL NFR BLD: 3.4 % (ref 0–8)
ERYTHROCYTE [DISTWIDTH] IN BLOOD BY AUTOMATED COUNT: 19.1 % (ref 11.5–14.5)
EST. GFR  (NO RACE VARIABLE): >60 ML/MIN/1.73 M^2
GLUCOSE SERPL-MCNC: 96 MG/DL (ref 70–110)
HCT VFR BLD AUTO: 31.1 % (ref 37–48.5)
HGB BLD-MCNC: 9.6 G/DL (ref 12–16)
IMM GRANULOCYTES # BLD AUTO: 0.04 K/UL (ref 0–0.04)
IMM GRANULOCYTES NFR BLD AUTO: 0.4 % (ref 0–0.5)
LITHIUM SERPL-SCNC: 0.9 MMOL/L (ref 0.6–1.2)
LYMPHOCYTES # BLD AUTO: 0.9 K/UL (ref 1–4.8)
LYMPHOCYTES NFR BLD: 10 % (ref 18–48)
MAGNESIUM SERPL-MCNC: 2 MG/DL (ref 1.6–2.6)
MCH RBC QN AUTO: 33.3 PG (ref 27–31)
MCHC RBC AUTO-ENTMCNC: 30.9 G/DL (ref 32–36)
MCV RBC AUTO: 108 FL (ref 82–98)
MONOCYTES # BLD AUTO: 0.5 K/UL (ref 0.3–1)
MONOCYTES NFR BLD: 5 % (ref 4–15)
NEUTROPHILS # BLD AUTO: 7.4 K/UL (ref 1.8–7.7)
NEUTROPHILS NFR BLD: 81.1 % (ref 38–73)
NRBC BLD-RTO: 0 /100 WBC
PHOSPHATE SERPL-MCNC: 2.4 MG/DL (ref 2.7–4.5)
PLATELET # BLD AUTO: 73 K/UL (ref 150–450)
PMV BLD AUTO: 10 FL (ref 9.2–12.9)
POCT GLUCOSE: 141 MG/DL (ref 70–110)
POCT GLUCOSE: 155 MG/DL (ref 70–110)
POCT GLUCOSE: 221 MG/DL (ref 70–110)
POCT GLUCOSE: 93 MG/DL (ref 70–110)
POTASSIUM SERPL-SCNC: 3 MMOL/L (ref 3.5–5.1)
PROT SERPL-MCNC: 4.3 G/DL (ref 6–8.4)
RBC # BLD AUTO: 2.88 M/UL (ref 4–5.4)
SODIUM SERPL-SCNC: 149 MMOL/L (ref 136–145)
TSH SERPL DL<=0.005 MIU/L-ACNC: 0.58 UIU/ML (ref 0.4–4)
WBC # BLD AUTO: 9.16 K/UL (ref 3.9–12.7)

## 2023-05-24 PROCEDURE — 80178 ASSAY OF LITHIUM: CPT | Performed by: STUDENT IN AN ORGANIZED HEALTH CARE EDUCATION/TRAINING PROGRAM

## 2023-05-24 PROCEDURE — 94761 N-INVAS EAR/PLS OXIMETRY MLT: CPT

## 2023-05-24 PROCEDURE — 97535 SELF CARE MNGMENT TRAINING: CPT

## 2023-05-24 PROCEDURE — 97530 THERAPEUTIC ACTIVITIES: CPT | Mod: CO

## 2023-05-24 PROCEDURE — 97530 THERAPEUTIC ACTIVITIES: CPT

## 2023-05-24 PROCEDURE — 82397 CHEMILUMINESCENT ASSAY: CPT | Performed by: STUDENT IN AN ORGANIZED HEALTH CARE EDUCATION/TRAINING PROGRAM

## 2023-05-24 PROCEDURE — 21400001 HC TELEMETRY ROOM

## 2023-05-24 PROCEDURE — 25000003 PHARM REV CODE 250: Performed by: STUDENT IN AN ORGANIZED HEALTH CARE EDUCATION/TRAINING PROGRAM

## 2023-05-24 PROCEDURE — 80053 COMPREHEN METABOLIC PANEL: CPT | Performed by: STUDENT IN AN ORGANIZED HEALTH CARE EDUCATION/TRAINING PROGRAM

## 2023-05-24 PROCEDURE — 83735 ASSAY OF MAGNESIUM: CPT | Performed by: STUDENT IN AN ORGANIZED HEALTH CARE EDUCATION/TRAINING PROGRAM

## 2023-05-24 PROCEDURE — 82330 ASSAY OF CALCIUM: CPT | Performed by: STUDENT IN AN ORGANIZED HEALTH CARE EDUCATION/TRAINING PROGRAM

## 2023-05-24 PROCEDURE — 85025 COMPLETE CBC W/AUTO DIFF WBC: CPT | Performed by: STUDENT IN AN ORGANIZED HEALTH CARE EDUCATION/TRAINING PROGRAM

## 2023-05-24 PROCEDURE — 84443 ASSAY THYROID STIM HORMONE: CPT | Performed by: STUDENT IN AN ORGANIZED HEALTH CARE EDUCATION/TRAINING PROGRAM

## 2023-05-24 PROCEDURE — 92526 ORAL FUNCTION THERAPY: CPT

## 2023-05-24 PROCEDURE — 36415 COLL VENOUS BLD VENIPUNCTURE: CPT | Performed by: STUDENT IN AN ORGANIZED HEALTH CARE EDUCATION/TRAINING PROGRAM

## 2023-05-24 PROCEDURE — 99900035 HC TECH TIME PER 15 MIN (STAT)

## 2023-05-24 PROCEDURE — 25000003 PHARM REV CODE 250: Performed by: INTERNAL MEDICINE

## 2023-05-24 PROCEDURE — 84100 ASSAY OF PHOSPHORUS: CPT | Performed by: STUDENT IN AN ORGANIZED HEALTH CARE EDUCATION/TRAINING PROGRAM

## 2023-05-24 PROCEDURE — 63600175 PHARM REV CODE 636 W HCPCS: Performed by: STUDENT IN AN ORGANIZED HEALTH CARE EDUCATION/TRAINING PROGRAM

## 2023-05-24 PROCEDURE — 97162 PT EVAL MOD COMPLEX 30 MIN: CPT

## 2023-05-24 RX ORDER — CALCITONIN SALMON 200 [USP'U]/ML
4 INJECTION, SOLUTION INTRAMUSCULAR; SUBCUTANEOUS ONCE
Status: DISCONTINUED | OUTPATIENT
Start: 2023-05-24 | End: 2023-05-24

## 2023-05-24 RX ORDER — IBUPROFEN 200 MG
16 TABLET ORAL
Status: DISCONTINUED | OUTPATIENT
Start: 2023-05-24 | End: 2023-05-24

## 2023-05-24 RX ORDER — INSULIN ASPART 100 [IU]/ML
0-5 INJECTION, SOLUTION INTRAVENOUS; SUBCUTANEOUS
Status: DISCONTINUED | OUTPATIENT
Start: 2023-05-24 | End: 2023-05-28 | Stop reason: HOSPADM

## 2023-05-24 RX ORDER — CALCITONIN SALMON 200 [USP'U]/ML
4 INJECTION, SOLUTION INTRAMUSCULAR; SUBCUTANEOUS
Status: COMPLETED | OUTPATIENT
Start: 2023-05-24 | End: 2023-05-25

## 2023-05-24 RX ORDER — LACTULOSE 10 G/15ML
30 SOLUTION ORAL 2 TIMES DAILY
Status: DISCONTINUED | OUTPATIENT
Start: 2023-05-24 | End: 2023-05-28 | Stop reason: HOSPADM

## 2023-05-24 RX ORDER — IBUPROFEN 200 MG
24 TABLET ORAL
Status: DISCONTINUED | OUTPATIENT
Start: 2023-05-24 | End: 2023-05-24

## 2023-05-24 RX ORDER — DEXTROSE 40 %
16 GEL (GRAM) ORAL
Status: DISCONTINUED | OUTPATIENT
Start: 2023-05-24 | End: 2023-05-28 | Stop reason: HOSPADM

## 2023-05-24 RX ORDER — DEXTROSE 40 %
24 GEL (GRAM) ORAL
Status: DISCONTINUED | OUTPATIENT
Start: 2023-05-24 | End: 2023-05-28 | Stop reason: HOSPADM

## 2023-05-24 RX ORDER — GLUCAGON 1 MG
1 KIT INJECTION
Status: DISCONTINUED | OUTPATIENT
Start: 2023-05-24 | End: 2023-05-28 | Stop reason: HOSPADM

## 2023-05-24 RX ADMIN — ZONISAMIDE 100 MG: 100 CAPSULE ORAL at 10:05

## 2023-05-24 RX ADMIN — SODIUM CHLORIDE: 0.9 INJECTION, SOLUTION INTRAVENOUS at 05:05

## 2023-05-24 RX ADMIN — LACTULOSE 30 G: 20 SOLUTION ORAL at 11:05

## 2023-05-24 RX ADMIN — FOLIC ACID 1 MG: 1 TABLET ORAL at 10:05

## 2023-05-24 RX ADMIN — SODIUM CHLORIDE: 0.9 INJECTION, SOLUTION INTRAVENOUS at 12:05

## 2023-05-24 RX ADMIN — POTASSIUM BICARBONATE 20 MEQ: 391 TABLET, EFFERVESCENT ORAL at 12:05

## 2023-05-24 RX ADMIN — SODIUM CHLORIDE: 0.9 INJECTION, SOLUTION INTRAVENOUS at 07:05

## 2023-05-24 RX ADMIN — POTASSIUM PHOSPHATE, MONOBASIC AND POTASSIUM PHOSPHATE, DIBASIC 20 MMOL: 224; 236 INJECTION, SOLUTION, CONCENTRATE INTRAVENOUS at 12:05

## 2023-05-24 RX ADMIN — RIFAXIMIN 550 MG: 550 TABLET ORAL at 11:05

## 2023-05-24 RX ADMIN — CALCITONIN SALMON 172 UNITS: 200 INJECTION, SOLUTION INTRAMUSCULAR; SUBCUTANEOUS at 03:05

## 2023-05-24 RX ADMIN — SODIUM CHLORIDE: 0.9 INJECTION, SOLUTION INTRAVENOUS at 02:05

## 2023-05-24 RX ADMIN — LEVETIRACETAM 1000 MG: 100 SOLUTION ORAL at 10:05

## 2023-05-24 RX ADMIN — THIAMINE HYDROCHLORIDE 500 MG: 100 INJECTION, SOLUTION INTRAMUSCULAR; INTRAVENOUS at 09:05

## 2023-05-24 RX ADMIN — CEFTRIAXONE 1 G: 1 INJECTION, POWDER, FOR SOLUTION INTRAMUSCULAR; INTRAVENOUS at 04:05

## 2023-05-24 RX ADMIN — THIAMINE HYDROCHLORIDE 500 MG: 100 INJECTION, SOLUTION INTRAMUSCULAR; INTRAVENOUS at 04:05

## 2023-05-24 RX ADMIN — SODIUM CHLORIDE: 0.9 INJECTION, SOLUTION INTRAVENOUS at 10:05

## 2023-05-24 RX ADMIN — Medication 100 MG: at 10:05

## 2023-05-24 RX ADMIN — LACTULOSE 30 G: 20 SOLUTION ORAL at 10:05

## 2023-05-24 RX ADMIN — RIFAXIMIN 550 MG: 550 TABLET ORAL at 10:05

## 2023-05-24 RX ADMIN — CALCITONIN SALMON 172 UNITS: 200 INJECTION, SOLUTION INTRAMUSCULAR; SUBCUTANEOUS at 01:05

## 2023-05-24 RX ADMIN — PROPRANOLOL HYDROCHLORIDE 20 MG: 10 TABLET ORAL at 10:05

## 2023-05-24 RX ADMIN — LEVETIRACETAM 1000 MG: 100 SOLUTION ORAL at 11:05

## 2023-05-24 NOTE — PT/OT/SLP PROGRESS
"Occupational Therapy   Treatment    Name: Marely Hamilton  MRN: [id number]  Admitting Diagnosis:  Acute encephalopathy       Recommendations:     Discharge Recommendations: other (see comments) (low intensity therapy pending progression medically; consideration of palliative consult recommended)  Discharge Equipment Recommendations:  shower chair  Barriers to discharge:  Decreased caregiver support, Other (Comment) (Increased level of assistance)    Assessment:     Marely Hamilton is a 58 y.o. female with a medical diagnosis of Acute encephalopathy.  Performance deficits affecting function are weakness, impaired endurance, impaired self care skills, impaired functional mobility, gait instability, impaired balance, impaired cognition, decreased coordination, decreased upper extremity function, decreased lower extremity function, decreased safety awareness, pain, decreased ROM, impaired coordination, impaired fine motor, impaired skin, edema.   Pt found in bed, awake, and attempting to communicate with therapy team.  Pt is not oriented with mostly nonsensical speech.  She squeezes therapists hand upon command and is able to make eye contact. She required Dependent assist x 2 for bed mobility and sat propped at EOB with Tot A.  Pt with 2 episodes of tremors in BUEs, L>R and rapid eye twitching, when supine then at EOB.  RN notified and assessed pt.  At end of session, pt more alert and responds "thank you"to therapist. Pt is progressing daily. Continue OT services to address functional goals, progressing as able.      Rehab Prognosis:  Good; patient would benefit from acute skilled OT services to address these deficits and reach maximum level of function.       Plan:     Patient to be seen 3 x/week to address the above listed problems via self-care/home management, therapeutic activities, therapeutic exercises  Plan of Care Expires: 06/23/23  Plan of Care Reviewed with: patient    Subjective     Chief Complaint: all over " "pain  Patient/Family Comments/goals: unable to state  Pain/Comfort:  Pain Rating 1:  (pt reports pain "all over"-did not rate)    Objective:     Communicated with: RN prior to session.  Patient found left sidelying/twisted in bed with bed alarm, NG tube, peripheral IV, telemetry, oxygen, PureWick upon OT entry to room.    General Precautions: Standard, fall, aspiration, NPO    Orthopedic Precautions:N/A  Braces: N/A  Respiratory Status: Nasal cannula     Occupational Performance:     Bed Mobility:    Patient completed Rolling/Turning to Left with  dependent and 2 persons  Patient completed Rolling/Turning to Right with dependent and 2 persons  Patient completed Scooting/Bridging with dependent and 2 persons, supine to HOB  Patient completed Supine to Sit with dependent and 2 persons  Patient completed Sit to Supine with dependent and 2 persons     Activities of Daily Living:  Lower Body Dressing: dependence        [facility] 6 Click ADL: 6    Treatment & Education:  Pt sat propped at EOB ~10 min with Tot A x 1-2.  Pt required assist to maintain BLE on floor.   Non-sensical speech, slouched posture(low tone), unable to maintain static sitting balance with episode of BUE tremors and rapid eye twitching while seated EOB.  RN assessed at bedside.   BUEs with edema and purplish bruising. Elevated on pillows at end of session.  Positioned pt upright for ST session. Pt seemed more alert at end of session.       Patient left HOB elevated with all lines intact, call button in reach, bed alarm on, and ST present, RN notified.    GOALS:   Multidisciplinary Problems       Occupational Therapy Goals          Problem: Occupational Therapy    Goal Priority Disciplines Outcome Interventions   Occupational Therapy Goal     OT, PT/OT Ongoing, Progressing    Description: Goals to be met by: 6/23/2023     Patient will increase functional independence with ADLs by performing:    *new goal 5/23/2023: following one step simple command with 25% " accuracy  Feeding with Set-up Assistance pending NG status and overarching ST ingestion recs.   UE Dressing with Minimal Assistance.  LE Dressing with Moderate Assistance.  Grooming while seated with Set-up Assistance.  Toileting from bedside commode with Stand-by Assistance for hygiene and clothing management.   Bathing seated UB sponge with Stand-by Assistance.  Toilet transfer to bedside commode with Minimal Assistance.  Increased functional strength to WFL as needed for recovery of effective use of UB as a stabilizer in routine ADL transfers.   Upper extremity exercise program x10 reps per handout, with assistance as needed.                         Time Tracking:     OT Date of Treatment: 05/24/23  OT Start Time: 0941  OT Stop Time: 1005  OT Total Time (min): 24 min    Billable Minutes:Therapeutic Activity 24               5/24/2023

## 2023-05-24 NOTE — PLAN OF CARE
Problem: Infection  Goal: Absence of Infection Signs and Symptoms  Outcome: Ongoing, Progressing  Chart check complete. Vitals, orders, labs, and progress notes reviewed. Care plan updated. Will monitor.

## 2023-05-24 NOTE — PT/OT/SLP PROGRESS
Speech Language Pathology Treatment    Patient Name:  Marely Hamilton   MRN:  [id number]  Admitting Diagnosis: Acute encephalopathy    Recommendations:                 General Recommendations:  Dysphagia therapy and Cognitive-linguistic evaluation  Diet recommendations:  Puree Diet - IDDSI Level 4, Liquid Diet Level: Thin liquids - IDDSI Level 0   Aspiration Precautions: 1 bite/sip at a time, Assistance with meals, Frequent oral care, HOB to 90 degrees, Meds crushed in puree, and Small bites/sips   General Precautions: Standard, fall, aspiration  Communication strategies:  yes/no questions only and provide increased time to answer    Assessment:     Marely Hamilton is a 58 y.o. female with a dx of Acute encephalopathy who presents with mod oral dysphagia, impacted by current mental status, however, the pt is safe to initiate po intake via pureed diet with thin liquids via TSP. Pt with cont severe cognitive-linguistic deficits, with pt unable to follow commands consistently or verbally communicate consistently. ST will cont to follow.     Subjective     ST obtained clearance from pt's nurse, Nenita, for ST session. Pt was alert and awake, seated EOB with PT/OT at b/s. PT/OT A the pt back to bed and ensured pt was in an upright position for po trials.     Patient goals: Pt did not state     Pain/Comfort:  Pain Rating 1:  (JAMIL 2/2 pt with dcr verbal output and comprehension)    Respiratory Status: Nasal cannula, flow 1 L/min    Objective:     Has the patient been evaluated by SLP for swallowing?   Yes  Keep patient NPO? No   Current Respiratory Status:        Swallowing: Prior to po trials, ST complete oral care utilizing moistened toothette. Pt with inconsistently ability to follow simple oral commands for oral care, with limited ability to access oral cavity. The pt required total A to consume the follow po trials: x2 ice chips, half a cup of pudding via tsp, x5 tsp sips of water, as well as x2 attempts of cup-edge sips  "of water. Pt with prolonged oral prep and mastication noted with ice chip and pudding boluses. Pt required increased time to clear all boluses from the oral cavity. Pt with dcr attention and acceptance of cup-edge sips of water, with anterior spillage noted upon trials. No overt s/s of aspiration noted across consistencies. Solids deferred at this time.     Communication/Cognition:    Following directions- Pt followed simple oral commands with less than 50% acc, requiring max verbal, tactile, and visual cues.    Spontaneous speech- Pt with inconsistent moments of spontaneous speech, including functional speech such as "thank you" when PT/OT exited the room, as well as telling ST "have a good day" upon ST's exit. Upon ST's prompting to state personal information or orientation information, the pt responded with tangential speech that did not relate to question asked. For instance, upon asking pt to state her name, she stated "green".    Goals:   Multidisciplinary Problems       SLP Goals          Problem: SLP    Goal Priority Disciplines Outcome   SLP Goal     SLP Ongoing, Progressing   Description: Short Term Goals:  1. Pt will participate in ongoing swallow eval to determine safest diet level.  2. Pt will participate in speech/lang eval to determine cognitive/communication baseline.   3. Pt will tolerate a pureed diet with thin liquids w/o any overt s/s of aspiration.                          Plan:     Patient to be seen:  3 x/week   Plan of Care expires:  06/20/23  Plan of Care reviewed with:  patient   SLP Follow-Up:  Yes       Discharge recommendations:   (pending improvement in AMS, low intensity of therapy at this time)   Barriers to Discharge:  None    Time Tracking:     SLP Treatment Date:   05/24/23  Speech Start Time:  0951  Speech Stop Time:  1015     Speech Total Time (min):  24 min    Billable Minutes: Treatment Swallowing Dysfunction 14 and Self Care/Home Management Training 10    05/24/2023  "

## 2023-05-24 NOTE — PLAN OF CARE
Problem: SLP  Goal: SLP Goal  Description: Short Term Goals:  1. Pt will participate in ongoing swallow eval to determine safest diet level.  2. Pt will participate in speech/lang eval to determine cognitive/communication baseline.   3. Pt will tolerate a pureed diet with thin liquids w/o any overt s/s of aspiration.     Outcome: Ongoing, Progressing    Pt is safe to advance to a pureed diet with thin liquids. Meds crushed in puree. No further need for NGT 2/2 initiation of po intake.

## 2023-05-24 NOTE — PLAN OF CARE
Pt flagged by OB that pt will require a level 2 PASRR. Sw faxed available information to OB. Pt will need psych evaluation. SW will continue to follow pt through transitions of care and assist with any discharge needs.    TAMMY Luna  [phone]       05/24/23 1516   Post-Acute Status   Post-Acute Authorization Placement

## 2023-05-24 NOTE — PROGRESS NOTES
"[facility] Hospital Medicine Progress Note    Primary Team: [facility] Hospitalist Team A  Attending Physician: Sarita Faith MD  Resident: Jorge  Intern: Kendall    Subjective:      Pt remains encephalopathic. Updating sister daily by telephone. Not able to participate with PT/OT/SLP at this time.      Objective:     Last 24 Hour Vital Signs:  BP  Min: 104/51  Max: 136/64  Temp  Av.6 °F (36.4 °C)  Min: 97.3 °F (36.3 °C)  Max: 97.7 °F (36.5 °C)  Pulse  Av.2  Min: 57  Max: 78  Resp  Av.7  Min: 16  Max: 20  SpO2  Av.1 %  Min: 94 %  Max: 100 %  I/O last 3 completed shifts:  In: 1874 [NG/GT:1825; IV Piggyback:49]  Out: 0     Physical Examination:  General: thin adult female sleeping, wakes to voice  HEENT: NC/AT, NGT in place   Neck: Supple, trachea midline  CV: systolic murmur throughout precordium, normal S1/S2, 2/6 JOE  Resp: CTAB, no wheezing or rhonchi appreciated, normal respiratory effort  Abd: Soft, NT/ND, normoactive BSx4  Extremities: 2+ pulses, bruising and edema in bilateral upper extremities   Skin: Warm, dry, intact, facial spider angiomas  Neuro: wakes to voice, tracks provider in the room. Does not consisently follow commands. Speaking in short sentences "doesn't hurt" but mostly unintelligible  Psych: unable to assess    Laboratory:  Recent Labs   Lab 23  0407 23  0817 23  0510 23  0929 23  1809   WBC 17.55*  --  16.84* 16.87*  --    HGB 12.0  --  10.7* 10.9*  --    HCT 39.2  --  33.0* 34.0*  --    PLT 99*  --  92* 114*  --    *  --  104* 106*  --    RDW 17.6*  --  18.8* 19.3*  --    NA  --  144 145 147* 147*   K  --  4.1 4.0 3.4* 4.8   CL  --  117* 118* 119* 121*   CO2  --  24 22* 25 21*   BUN  --  19 19 19 17   CREATININE  --  0.7 0.9 0.9 0.9   GLU  --  145* 102 154* 159*   PROT  --  5.0* 4.9* 4.8*  --    ALBUMIN  --  2.0* 1.8* 1.8*  --    BILITOT  --  3.2* 2.6* 2.2*  --    AST  --  97* 90* 96*  --    ALKPHOS  --  135 146* 196*  --    ALT  --  53* 48* 54*  " --      Urine culture growing Proteus mirabilis     Other Results:  EKG (my interpretation): NSR    Radiology Data: no new    Current Medications:     Infusions:   sodium chloride 0.9% 200 mL/hr at 05/24/23 0212        Scheduled:   cefTRIAXone (ROCEPHIN) IVPB  1 g Intravenous Q24H    folic acid  1 mg Per NG tube Daily    lactulose  30 g Per NG tube TID    levetiracetam  1,000 mg Per NG tube BID    OLANZapine  5 mg Per NG tube QHS    propranoloL  20 mg Per NG tube Daily    QUEtiapine  100 mg Per NG tube QHS    rifAXIMin  550 mg Per NG tube BID    thiamine  100 mg Per NG tube Daily    zonisamide  100 mg Per NG tube Daily        PRN:  melatonin, sodium chloride 0.9%    Assessment:     Marely Hamilton is a 58 y.o.female w/ PMHx of EtOH induced cirrhosis, seizure on AEDs, and bipolar disorder who presented to [facility] on 5/18/2023 with a primary complaint of altered mental status for 1 day due to hepatic encephalopathy and UTI. Admission complicated by hypercalcemia likely 2/2 super therapeutic lithium level.      Plan:     Moderate to Severe Hypercalcemia  Ca 11.9 on CMP in ED.  - ionized Ca elevated at 1.6, PTH 84 consistent with primary hyperparathyroidism  - calcium increased to 14 when corrected for hypoalbuminemia   - likely multifactorial due to lithium and dehydration, may be contributing to her encephalopathy  - discontinue lithium, multivitamin , tube feeds  - continue NS at 200 mL/hr, s/p IM calcitonin x2, IV zolendronate  - following CMP/ionized calcium   - check PTHrP, TSH    Acute Encephalopathy due to hepatic encephalopathy & hypercalcemia  EtOh-induced Cirrhosis  Pt found altered by sitter (CNA) earlier on day of admission. She has a hx of hepatic encephalopathy 2/2 medication non-compliance. Pt found to have jaundice and scleral icterus on physical exam. Ammonia 139, Tbili 4.8 in ED.  - Likely etiology of hepatic encephalopathy 2/2 medication non-compliance (especially given past hx  and PE findings) vs UTI vs hypernatremia  - CTH with no acute abnormality   - Continue lactulose, rifaximine, thiamine, and multivitamin  - Keep NPO pending Speech eval and improvement in mentation; NGT feeds held due to calcium content   - NGT for lactulose administration  - plan for further workup w/neurology, MRI, EEG if electrolytes normalized and not back to baseline    Hypophosphatemia  - phos 1.9  - replaced with K Phos 20 mmol   - follow phos levels     Bipolar Disorder  H/o Self Harm  Supertheraputic lithium level  Pt has bipolar disorder w/ h/o suicide attempt including intentional overdose w/ acetaminophen, self harm, and medication non-compliance.  Plan:  - Lithium held due to hypercalcemia and elevated level  - level at admit 0.3; repeat 5/23 1.4 after lithium given BID  - trend lithium level to ensure downtrend   - Seroquel, and Zyprexa; held seroquel and zyprexa due to somnolence   - Continue to closely monitor pt's behavior as encephalopathy improves  - consult psych this week for guidance on resumption of meds    Proteus UTI  UA in ED w/ 3+ occult blood, 2.0-3.0 urobilinogen, 3+ leukocytes, 34 RBCs, >100 WBC.  - Repeat UA w/ cx d/t dirty catch on initial UA  - previous urine culture with >100K CFU Proteus mirabilis   - cultures growing pan-sensitive Proteus Mirabilis   - cefepime narrowed to ceftriaxone, completes 7 day course 5/24     Leukocytosis  - WBC 13 on admit, increased to 17.55  - patient afebrile, undergoing tx for UTI  - CXR without consolidation concerning for pneumonia     Elevated Troponin  Troponin mildly elevated to 0.037 in ED.  Plan:  - Likely d/t demand in setting of hypovolemia  - EKG did not demonstrate new abnormalities  - Troponin peaked, down trend to 0.035  - Continue to routinely monitor vitals     AYESHA, improving   Cr 1.1 on CMP in ED, baseline~ 0.6.  Plan:  - Likely pre-renal in etiology given hypovolemia  - following CMPs  - Renally dose meds      Thrombocytopenia  Pt has  known hx of thrombocytopenia. Plt 147 on CBC in ED.  Plan:  - due to cirrhosis  - no acute conern for bleeding, continue to monitor      Seizure Disorder  Pt has hx of seizures and is currently on Keppra 1000 mg BID and Zonisamide 100 mg daily at home. No reported seizure for several years. Per chart review, seizures appear to have taken place in setting of EtOH withdrawal.  Plan:  - Continue home Keppra and Zonisamide  - Continue to monitor  - Recommend Neuro f/u at discharge    Hypernatremia, improving  Na 155 on CMP in ED.  Plan:  -improved, follow on serial CMPs    Healthcare Maintenance  Pt not UTD on vaccinations or preventative screenings.  Plan:  - F/u w/ PCP at discharge to discuss preventative care    Dispo: pending improvement in encephalopathy, electrolyte derangements    Flavio Patel MD  [facility] Medicine-Pediatrics HO-4  05/24/2023 7:15 AM    [facility] Medicine Hospitalist Pager numbers:   [facility] Hospitalist Medicine Team A (Angela/Vianney): -[phone]  [facility] Hospitalist Medicine Team B (Lyn/Edenilson):  [phone]

## 2023-05-24 NOTE — PLAN OF CARE
Problem: Infection  Goal: Absence of Infection Signs and Symptoms  Outcome: Ongoing, Progressing     Disoriented x4. VS on 1L NC. Bed locked, in low position, and call light within reach. Will continue to monitor.

## 2023-05-24 NOTE — PLAN OF CARE
Evaluated pt and reviewed pt's results this morning. Upon examination, pt seemed improved compared to yesterday. She was GCS [id number], and was intermittently able to follow commands, but with incoherent speech at times with perseveration and echolalia. Her cranial nerve reflexes were intact with symmetric grimace, and she localized to pain in all 4 extremities symmetrically. Reflexes were 2+ throughout and she exhibited some tremor/myoclonus/asterixis in her upper extremities and neck.   Her last ionized calcium continues to be elevated at 1.52.  Pt's acute encephalopathy is multifactorial with contributions from hepatic disease and hypercalcemia.     -Continue treatment of hepatic encephalopathy with lactulose and rifaximin.   -Continue treatment of hypercalcemia  -If pt's mental status does not improve after correction of electrolyte/metabolic abnormalities, will consider EEG.     Case discussed with Dr. Luly Briones MD  [employer] Neurology PGY-3
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
## 2023-05-24 NOTE — PLAN OF CARE
"  Problem: Occupational Therapy  Goal: Occupational Therapy Goal  Description: Goals to be met by: 6/23/2023     Patient will increase functional independence with ADLs by performing:    *new goal 5/23/2023: following one step simple command with 25% accuracy  Feeding with Set-up Assistance pending NG status and overarching ST ingestion recs.   UE Dressing with Minimal Assistance.  LE Dressing with Moderate Assistance.  Grooming while seated with Set-up Assistance.  Toileting from bedside commode with Stand-by Assistance for hygiene and clothing management.   Bathing seated UB sponge with Stand-by Assistance.  Toilet transfer to bedside commode with Minimal Assistance.  Increased functional strength to WFL as needed for recovery of effective use of UB as a stabilizer in routine ADL transfers.   Upper extremity exercise program x10 reps per handout, with assistance as needed.    Outcome: Ongoing, Progressing   Marely Hamilton is a 58 y.o. female with a medical diagnosis of Acute encephalopathy.  Performance deficits affecting function are weakness, impaired endurance, impaired self care skills, impaired functional mobility, gait instability, impaired balance, impaired cognition, decreased coordination, decreased upper extremity function, decreased lower extremity function, decreased safety awareness, pain, decreased ROM, impaired coordination, impaired fine motor, impaired skin, edema.   Pt found in bed, awake, and attempting to communicate with therapy team.  Pt is not oriented with mostly nonsensical speech.  She squeezes therapists hand upon command and is able to make eye contact. She required Dependent assist x 2 for bed mobility and sat propped at EOB with Tot A.  Pt with 2 episodes of tremors in BUEs, L>R and rapid eye twitching, when supine then at EOB.  RN notified and assessed pt.  At end of session, pt more alert and responds "thank you"to therapist. Pt is progressing daily. Continue OT services to " address functional goals, progressing as able.

## 2023-05-24 NOTE — PLAN OF CARE
Problem: Physical Therapy  Goal: Physical Therapy Goal  Description: Goals to be met by: 23     Patient will increase functional independence with mobility by performin. Supine to sit with Minimal Assistance  2. Sit to supine with Minimal Assistance  3. Sit to stand transfer with Minimal Assistance  4. Bed to chair transfer with Minimal Assistance using Rolling Walker  5. Gait  x 25 feet with Minimal Assistance using Rolling Walker.     Outcome: Ongoing, Progressing     PT Eval completed, note to follow. Pt is disoriented and presenting with mostly incoherent speech. Pt occasionally states words and phrases such as yes/no and thank you. Pt is able to follow commands to squeeze hand B and slightly bend B knees in supine. Pt requires dependent assist x 2 for bed mobility and sat EOB ~10 min propped with TotalA. Pt with decreased alertness sitting EOB and increased tremors in BUE (L>R) and face (including rapid eye twitching) - nurse notified and in room to assess pt. Pt returned supine and alertness improved and tremors decreased. Low intensity vs moderate intensity therapy pending progression medically.

## 2023-05-25 LAB
ALBUMIN SERPL BCP-MCNC: 1.4 G/DL (ref 3.5–5.2)
ALP SERPL-CCNC: 148 U/L (ref 55–135)
ALT SERPL W/O P-5'-P-CCNC: 57 U/L (ref 10–44)
ANION GAP SERPL CALC-SCNC: 1 MMOL/L (ref 8–16)
ANION GAP SERPL CALC-SCNC: 2 MMOL/L (ref 8–16)
AST SERPL-CCNC: 95 U/L (ref 10–40)
BASOPHILS # BLD AUTO: 0 K/UL (ref 0–0.2)
BASOPHILS NFR BLD: 0 % (ref 0–1.9)
BILIRUB SERPL-MCNC: 2.5 MG/DL (ref 0.1–1)
BUN SERPL-MCNC: 12 MG/DL (ref 6–20)
BUN SERPL-MCNC: 13 MG/DL (ref 6–20)
CA-I BLDV-SCNC: 1.44 MMOL/L (ref 1.06–1.42)
CALCIUM SERPL-MCNC: 7.9 MG/DL (ref 8.7–10.5)
CALCIUM SERPL-MCNC: 8.2 MG/DL (ref 8.7–10.5)
CHLORIDE SERPL-SCNC: 124 MMOL/L (ref 95–110)
CHLORIDE SERPL-SCNC: 128 MMOL/L (ref 95–110)
CO2 SERPL-SCNC: 18 MMOL/L (ref 23–29)
CO2 SERPL-SCNC: 19 MMOL/L (ref 23–29)
CREAT SERPL-MCNC: 0.6 MG/DL (ref 0.5–1.4)
CREAT SERPL-MCNC: 0.6 MG/DL (ref 0.5–1.4)
DIFFERENTIAL METHOD: ABNORMAL
EOSINOPHIL # BLD AUTO: 0.5 K/UL (ref 0–0.5)
EOSINOPHIL NFR BLD: 5.3 % (ref 0–8)
ERYTHROCYTE [DISTWIDTH] IN BLOOD BY AUTOMATED COUNT: 19.9 % (ref 11.5–14.5)
EST. GFR  (NO RACE VARIABLE): >60 ML/MIN/1.73 M^2
EST. GFR  (NO RACE VARIABLE): >60 ML/MIN/1.73 M^2
GLUCOSE SERPL-MCNC: 105 MG/DL (ref 70–110)
GLUCOSE SERPL-MCNC: 217 MG/DL (ref 70–110)
HCT VFR BLD AUTO: 28.4 % (ref 37–48.5)
HGB BLD-MCNC: 8.9 G/DL (ref 12–16)
IMM GRANULOCYTES # BLD AUTO: 0.03 K/UL (ref 0–0.04)
IMM GRANULOCYTES NFR BLD AUTO: 0.3 % (ref 0–0.5)
LITHIUM SERPL-SCNC: 0.7 MMOL/L (ref 0.6–1.2)
LYMPHOCYTES # BLD AUTO: 0.8 K/UL (ref 1–4.8)
LYMPHOCYTES NFR BLD: 9.6 % (ref 18–48)
MAGNESIUM SERPL-MCNC: 1.7 MG/DL (ref 1.6–2.6)
MCH RBC QN AUTO: 33.7 PG (ref 27–31)
MCHC RBC AUTO-ENTMCNC: 31.3 G/DL (ref 32–36)
MCV RBC AUTO: 108 FL (ref 82–98)
MONOCYTES # BLD AUTO: 0.6 K/UL (ref 0.3–1)
MONOCYTES NFR BLD: 6.7 % (ref 4–15)
NEUTROPHILS # BLD AUTO: 6.7 K/UL (ref 1.8–7.7)
NEUTROPHILS NFR BLD: 78.1 % (ref 38–73)
NRBC BLD-RTO: 0 /100 WBC
PHOSPHATE SERPL-MCNC: 1.8 MG/DL (ref 2.7–4.5)
PHOSPHATE SERPL-MCNC: 3 MG/DL (ref 2.7–4.5)
PLATELET # BLD AUTO: 82 K/UL (ref 150–450)
PMV BLD AUTO: 9.7 FL (ref 9.2–12.9)
POCT GLUCOSE: 132 MG/DL (ref 70–110)
POCT GLUCOSE: 156 MG/DL (ref 70–110)
POCT GLUCOSE: 201 MG/DL (ref 70–110)
POCT GLUCOSE: 229 MG/DL (ref 70–110)
POTASSIUM SERPL-SCNC: 3 MMOL/L (ref 3.5–5.1)
POTASSIUM SERPL-SCNC: 4.8 MMOL/L (ref 3.5–5.1)
PROT SERPL-MCNC: 3.7 G/DL (ref 6–8.4)
RBC # BLD AUTO: 2.64 M/UL (ref 4–5.4)
SODIUM SERPL-SCNC: 143 MMOL/L (ref 136–145)
SODIUM SERPL-SCNC: 149 MMOL/L (ref 136–145)
WBC # BLD AUTO: 8.63 K/UL (ref 3.9–12.7)

## 2023-05-25 PROCEDURE — 63600175 PHARM REV CODE 636 W HCPCS: Performed by: STUDENT IN AN ORGANIZED HEALTH CARE EDUCATION/TRAINING PROGRAM

## 2023-05-25 PROCEDURE — 99900035 HC TECH TIME PER 15 MIN (STAT)

## 2023-05-25 PROCEDURE — 93005 ELECTROCARDIOGRAM TRACING: CPT

## 2023-05-25 PROCEDURE — 94761 N-INVAS EAR/PLS OXIMETRY MLT: CPT

## 2023-05-25 PROCEDURE — 97530 THERAPEUTIC ACTIVITIES: CPT

## 2023-05-25 PROCEDURE — 80178 ASSAY OF LITHIUM: CPT | Performed by: STUDENT IN AN ORGANIZED HEALTH CARE EDUCATION/TRAINING PROGRAM

## 2023-05-25 PROCEDURE — 90833 PR PSYCHOTHERAPY W/PATIENT W/E&M, 30 MIN (ADD ON): ICD-10-PCS | Mod: ,,, | Performed by: PSYCHIATRY & NEUROLOGY

## 2023-05-25 PROCEDURE — 80053 COMPREHEN METABOLIC PANEL: CPT | Performed by: STUDENT IN AN ORGANIZED HEALTH CARE EDUCATION/TRAINING PROGRAM

## 2023-05-25 PROCEDURE — 93010 ELECTROCARDIOGRAM REPORT: CPT | Mod: ,,, | Performed by: INTERNAL MEDICINE

## 2023-05-25 PROCEDURE — 90833 PSYTX W PT W E/M 30 MIN: CPT | Mod: ,,, | Performed by: PSYCHIATRY & NEUROLOGY

## 2023-05-25 PROCEDURE — 99223 1ST HOSP IP/OBS HIGH 75: CPT | Mod: ,,, | Performed by: PSYCHIATRY & NEUROLOGY

## 2023-05-25 PROCEDURE — 84100 ASSAY OF PHOSPHORUS: CPT | Mod: 91 | Performed by: STUDENT IN AN ORGANIZED HEALTH CARE EDUCATION/TRAINING PROGRAM

## 2023-05-25 PROCEDURE — 63600175 PHARM REV CODE 636 W HCPCS

## 2023-05-25 PROCEDURE — 36415 COLL VENOUS BLD VENIPUNCTURE: CPT | Performed by: STUDENT IN AN ORGANIZED HEALTH CARE EDUCATION/TRAINING PROGRAM

## 2023-05-25 PROCEDURE — 36410 VNPNXR 3YR/> PHY/QHP DX/THER: CPT

## 2023-05-25 PROCEDURE — 83735 ASSAY OF MAGNESIUM: CPT | Performed by: STUDENT IN AN ORGANIZED HEALTH CARE EDUCATION/TRAINING PROGRAM

## 2023-05-25 PROCEDURE — 99223 PR INITIAL HOSPITAL CARE,LEVL III: ICD-10-PCS | Mod: ,,, | Performed by: PSYCHIATRY & NEUROLOGY

## 2023-05-25 PROCEDURE — 80048 BASIC METABOLIC PNL TOTAL CA: CPT | Mod: XB | Performed by: STUDENT IN AN ORGANIZED HEALTH CARE EDUCATION/TRAINING PROGRAM

## 2023-05-25 PROCEDURE — 21400001 HC TELEMETRY ROOM

## 2023-05-25 PROCEDURE — 85025 COMPLETE CBC W/AUTO DIFF WBC: CPT | Performed by: STUDENT IN AN ORGANIZED HEALTH CARE EDUCATION/TRAINING PROGRAM

## 2023-05-25 PROCEDURE — 25000003 PHARM REV CODE 250

## 2023-05-25 PROCEDURE — C1751 CATH, INF, PER/CENT/MIDLINE: HCPCS

## 2023-05-25 PROCEDURE — 25000003 PHARM REV CODE 250: Performed by: INTERNAL MEDICINE

## 2023-05-25 PROCEDURE — 97530 THERAPEUTIC ACTIVITIES: CPT | Mod: CO

## 2023-05-25 PROCEDURE — 93010 EKG 12-LEAD: ICD-10-PCS | Mod: ,,, | Performed by: INTERNAL MEDICINE

## 2023-05-25 PROCEDURE — 82330 ASSAY OF CALCIUM: CPT | Performed by: STUDENT IN AN ORGANIZED HEALTH CARE EDUCATION/TRAINING PROGRAM

## 2023-05-25 PROCEDURE — 27000221 HC OXYGEN, UP TO 24 HOURS

## 2023-05-25 PROCEDURE — 25000003 PHARM REV CODE 250: Performed by: STUDENT IN AN ORGANIZED HEALTH CARE EDUCATION/TRAINING PROGRAM

## 2023-05-25 RX ORDER — DEXTROSE MONOHYDRATE 50 MG/ML
INJECTION, SOLUTION INTRAVENOUS CONTINUOUS
Status: DISCONTINUED | OUTPATIENT
Start: 2023-05-25 | End: 2023-05-25

## 2023-05-25 RX ORDER — ENOXAPARIN SODIUM 100 MG/ML
40 INJECTION SUBCUTANEOUS EVERY 24 HOURS
Status: DISCONTINUED | OUTPATIENT
Start: 2023-05-25 | End: 2023-05-28 | Stop reason: HOSPADM

## 2023-05-25 RX ORDER — ARIPIPRAZOLE 5 MG/1
5 TABLET ORAL DAILY
Status: DISCONTINUED | OUTPATIENT
Start: 2023-05-25 | End: 2023-05-28 | Stop reason: HOSPADM

## 2023-05-25 RX ORDER — MAGNESIUM SULFATE HEPTAHYDRATE 40 MG/ML
2 INJECTION, SOLUTION INTRAVENOUS ONCE
Status: COMPLETED | OUTPATIENT
Start: 2023-05-25 | End: 2023-05-25

## 2023-05-25 RX ADMIN — LACTULOSE 30 G: 20 SOLUTION ORAL at 09:05

## 2023-05-25 RX ADMIN — LACTULOSE 30 G: 20 SOLUTION ORAL at 11:05

## 2023-05-25 RX ADMIN — FOLIC ACID 1 MG: 1 TABLET ORAL at 11:05

## 2023-05-25 RX ADMIN — SODIUM CHLORIDE: 0.9 INJECTION, SOLUTION INTRAVENOUS at 03:05

## 2023-05-25 RX ADMIN — PROPRANOLOL HYDROCHLORIDE 20 MG: 10 TABLET ORAL at 11:05

## 2023-05-25 RX ADMIN — MAGNESIUM SULFATE 2 G: 2 INJECTION INTRAVENOUS at 07:05

## 2023-05-25 RX ADMIN — DEXTROSE MONOHYDRATE: 50 INJECTION, SOLUTION INTRAVENOUS at 07:05

## 2023-05-25 RX ADMIN — ENOXAPARIN SODIUM 40 MG: 40 INJECTION SUBCUTANEOUS at 05:05

## 2023-05-25 RX ADMIN — POTASSIUM BICARBONATE 25 MEQ: 978 TABLET, EFFERVESCENT ORAL at 03:05

## 2023-05-25 RX ADMIN — POTASSIUM BICARBONATE 25 MEQ: 978 TABLET, EFFERVESCENT ORAL at 11:05

## 2023-05-25 RX ADMIN — LEVETIRACETAM 1000 MG: 100 SOLUTION ORAL at 11:05

## 2023-05-25 RX ADMIN — POTASSIUM PHOSPHATE, MONOBASIC AND POTASSIUM PHOSPHATE, DIBASIC 30 MMOL: 224; 236 INJECTION, SOLUTION, CONCENTRATE INTRAVENOUS at 11:05

## 2023-05-25 RX ADMIN — THIAMINE HYDROCHLORIDE 250 MG: 100 INJECTION, SOLUTION INTRAMUSCULAR; INTRAVENOUS at 08:05

## 2023-05-25 RX ADMIN — CALCITONIN SALMON 172 UNITS: 200 INJECTION, SOLUTION INTRAMUSCULAR; SUBCUTANEOUS at 01:05

## 2023-05-25 RX ADMIN — ZONISAMIDE 100 MG: 100 CAPSULE ORAL at 11:05

## 2023-05-25 RX ADMIN — RIFAXIMIN 550 MG: 550 TABLET ORAL at 11:05

## 2023-05-25 RX ADMIN — DEXTROSE AND POTASSIUM CHLORIDE: 5; .15 SOLUTION INTRAVENOUS at 12:05

## 2023-05-25 RX ADMIN — INSULIN ASPART 1 UNITS: 100 INJECTION, SOLUTION INTRAVENOUS; SUBCUTANEOUS at 09:05

## 2023-05-25 RX ADMIN — LEVETIRACETAM 1000 MG: 100 SOLUTION ORAL at 09:05

## 2023-05-25 RX ADMIN — ARIPIPRAZOLE 5 MG: 5 TABLET ORAL at 11:05

## 2023-05-25 RX ADMIN — RIFAXIMIN 550 MG: 550 TABLET ORAL at 09:05

## 2023-05-25 NOTE — PROGRESS NOTES
[facility] Hospital Medicine Progress Note    Primary Team: [facility] Hospitalist Team A  Attending Physician: Sarita Faith MD  Resident: Jorge  Intern: Kendall    Subjective:      Pt remains encephalopathic. Updating sister daily by telephone. Minimal participation with PT/OT/SLP yesterday and some oral intake.      Objective:     Last 24 Hour Vital Signs:  BP  Min: 91/50  Max: 111/54  Temp  Av.6 °F (35.9 °C)  Min: 94.6 °F (34.8 °C)  Max: 98 °F (36.7 °C)  Pulse  Av  Min: 50  Max: 65  Resp  Av.8  Min: 16  Max: 20  SpO2  Av.8 %  Min: 95 %  Max: 99 %  I/O last 3 completed shifts:  In: 710 [NG/GT:710]  Out: -     Physical Examination:  General: thin adult female sleeping, wakes to voice  HEENT: NC/AT, NGT in place   Neck: Supple, trachea midline  CV: 2/6 systolic murmur throughout precordium, normal S1/S2  Resp: CTAB, no wheezing or rhonchi appreciated, normal respiratory effort  Abd: Soft, NT/ND, normoactive BSx4  Extremities: 2+ pulses, bruising and edema in bilateral upper extremities   Skin: Warm, dry, intact, facial spider angiomas  Neuro: wakes to voice. Does not consisently follow commands. Speaking in short sentences, but mostly unintelligible  Psych: unable to assess    Laboratory:  Recent Labs   Lab 23  0929 23  1809 23  0802 23  0555   WBC 16.87*  --  9.16 8.63   HGB 10.9*  --  9.6* 8.9*   HCT 34.0*  --  31.1* 28.4*   *  --  73* 82*   *  --  108* 108*   RDW 19.3*  --  19.1* 19.9*   * 147* 149* 149*   K 3.4* 4.8 3.0* 3.0*   * 121* 125* 128*   CO2 25 21* 23 19*   BUN 19 17 16 13   CREATININE 0.9 0.9 0.6 0.6   * 159* 96 105   PROT 4.8*  --  4.3* 3.7*   ALBUMIN 1.8*  --  1.6* 1.4*   BILITOT 2.2*  --  2.5* 2.5*   AST 96*  --  87* 95*   ALKPHOS 196*  --  162* 148*   ALT 54*  --  55* 57*     Urine culture growing Proteus mirabilis     Other Results:  EKG (my interpretation): NSR    Radiology Data: no new    Current Medications:     Infusions:    sodium chloride 0.9% 200 mL/hr at 05/25/23 0351        Scheduled:   folic acid  1 mg Per NG tube Daily    lactulose  30 g Per NG tube BID    levetiracetam  1,000 mg Per NG tube BID    OLANZapine  5 mg Per NG tube QHS    propranoloL  20 mg Per NG tube Daily    QUEtiapine  100 mg Per NG tube QHS    rifAXIMin  550 mg Per NG tube BID    thiamine (VITAMIN B1) IVPB  250 mg Intravenous Daily    thiamine  100 mg Per NG tube Daily    zonisamide  100 mg Per NG tube Daily        PRN:  dextrose 10%, dextrose 10%, dextrose, dextrose, glucagon (human recombinant), insulin aspart U-100, melatonin, sodium chloride 0.9%    Assessment:     Marely Hamilton is a 58 y.o.female w/ PMHx of EtOH induced cirrhosis, seizure on AEDs, and bipolar disorder who presented to [facility] on 5/18/2023 with a primary complaint of altered mental status for 1 day due to hepatic encephalopathy and UTI. Admission complicated by hypercalcemia likely 2/2 super therapeutic lithium level.      Plan:     Moderate to Severe Hypercalcemia, improving   Ca 11.9 on CMP in ED.  - ionized Ca elevated at 1.6, PTH 84 consistent with primary hyperparathyroidism  - calcium increased to 14 when corrected for hypoalbuminemia   - likely multifactorial due to lithium and dehydration, may be contributing to her encephalopathy  - discontinue lithium, multivitamin , tube feeds  - s/p NS at 200 mL/hr, s/p IM calcitonin x3, IV zolendronate  - following CMP/ionized calcium   - PTHrP pending, TSH wnl  - NS discontinued at this time due to hypernatremia and hyperchloremia  - consider cinacalcet if pt is restarted on lithium as she is not a surgical candidate for parathyroidectomy     Acute Encephalopathy due to hepatic encephalopathy & hypercalcemia  EtOh-induced Cirrhosis  Pt found altered by sitter (CNA) earlier on day of admission. She has a hx of hepatic encephalopathy 2/2 medication non-compliance. Pt found to have jaundice and scleral icterus on physical  exam. Ammonia 139, Tbili 4.8 in ED.  - Likely etiology of hepatic encephalopathy 2/2 medication non-compliance (especially given past hx and PE findings) vs UTI vs hypernatremia  - CTH with no acute abnormality   - Continue lactulose, rifaximine, thiamine, and multivitamin  - Keep NPO pending Speech eval and improvement in mentation; NGT feeds held due to calcium content   - NGT for lactulose administration  - plan for further workup w/neurology, MRI, EEG if electrolytes normalized and not back to baseline  - Neurology consulted    Hypophosphatemia  - phos 1.8  - replaced with K Phos 30 mmol   - follow phos levels    Hypokalemia  - K 3  - replaced with potassium bicarb 50 mEq    Hypomagnesemia   - Mag 1.7  - replace with mag sulfate 2g    Hypernatremia  Hyperchloremia  - NS discontinued for treatment of hypercalcemia  - D5W infusion, continue to monitor      Bipolar Disorder  H/o Self Harm  Supertheraputic lithium level  Pt has bipolar disorder w/ h/o suicide attempt including intentional overdose w/ acetaminophen, self harm, and medication non-compliance.  Plan:  - Lithium held due to hypercalcemia and elevated level  - level at admit 0.3; repeat 5/23 1.4 after lithium given BID  - trend lithium level to ensure downtrend   - Seroquel, and Zyprexa; held seroquel and zyprexa due to somnolence   - Continue to closely monitor pt's behavior as encephalopathy improves  - consult psych this week for guidance on resumption of meds    Proteus UTI  UA in ED w/ 3+ occult blood, 2.0-3.0 urobilinogen, 3+ leukocytes, 34 RBCs, >100 WBC.  - Repeat UA w/ cx d/t dirty catch on initial UA  - previous urine culture with >100K CFU Proteus mirabilis   - cultures growing pan-sensitive Proteus Mirabilis   - cefepime narrowed to ceftriaxone, completes 7 day course 5/24     Leukocytosis, improved  - WBC 13 on admit, increased to 17.55  - patient afebrile, s/p tx for UTI  - CXR without consolidation concerning for pneumonia   - WBC now  within normal limits, leukocytosis likely due to lithium     Elevated Troponin  Troponin mildly elevated to 0.037 in ED.  Plan:  - Likely d/t demand in setting of hypovolemia  - EKG did not demonstrate new abnormalities  - Troponin peaked, down trend to 0.035  - Continue to routinely monitor vitals     AYESHA, improving   Cr 1.1 on CMP in ED, baseline~ 0.6.  Plan:  - Likely pre-renal in etiology given hypovolemia  - following CMPs  - Renally dose meds      Thrombocytopenia  Pt has known hx of thrombocytopenia. Plt 147 on CBC in ED.  Plan:  - due to cirrhosis  - no acute conern for bleeding, continue to monitor      Seizure Disorder  Pt has hx of seizures and is currently on Keppra 1000 mg BID and Zonisamide 100 mg daily at home. No reported seizure for several years. Per chart review, seizures appear to have taken place in setting of EtOH withdrawal.  Plan:  - Continue home Keppra and Zonisamide  - Continue to monitor  - Recommend Neuro f/u at discharge    Healthcare Maintenance  Pt not UTD on vaccinations or preventative screenings.  Plan:  - F/u w/ PCP at discharge to discuss preventative care    Dispo: pending improvement in encephalopathy, electrolyte derangements    Bridget Argueta MD  [facility] Medicine-Pediatrics [facility]  05/25/2023 7:15 AM    [facility] Medicine Hospitalist Pager numbers:   [facility] Hospitalist Medicine Team A (Angela/Vianney): [phone]  [facility] Hospitalist Medicine Team B (Lyn/Edenilson):  [phone]

## 2023-05-25 NOTE — PT/OT/SLP PROGRESS
"Physical Therapy Treatment    Patient Name:  Marely Hamilton   MRN:  [id number]    Recommendations:     Discharge Recommendations:  (low intensity vs moderate intensity therapy pending progression medically)  Discharge Equipment Recommendations:  (TBD)  Barriers to discharge: Decreased caregiver support and increased level of assistance    Assessment:     Marely Hamilton is a 58 y.o. female admitted with a medical diagnosis of Acute encephalopathy.  She presents with the following impairments/functional limitations: weakness, impaired functional mobility, gait instability, impaired endurance, impaired self care skills, impaired balance, decreased lower extremity function, decreased upper extremity function, decreased coordination, decreased safety awareness, impaired cognition, impaired skin, abnormal tone, decreased ROM, edema, impaired coordination, impaired fine motor . Pt's level of alertness was decreased this date and pt with eyes closed through most of session. Pt continues to require dependent assist of 2 for supine<>sit.    Rehab Prognosis: Fair; patient would benefit from acute skilled PT services to address these deficits and reach maximum level of function.    Recent Surgery: * No surgery found *      Plan:     During this hospitalization, patient to be seen 2 x/week to address the identified rehab impairments via therapeutic activities, therapeutic exercises, gait training, neuromuscular re-education and progress toward the following goals:    Plan of Care Expires:  06/24/23    Subjective     Chief Complaint: none  Patient/Family Comments/goals: pt states "leave me alone" but mostly incoherent speech  Pain/Comfort:   Pt unable to report      Objective:     Communicated with nsg prior to session.  Patient found HOB elevated with bed alarm, NG tube, peripheral IV, telemetry, oxygen, pressure relief boots, SCD upon PT entry to room.     General Precautions: Standard, fall, aspiration, NPO  Orthopedic " Precautions: N/A  Braces: N/A  Respiratory Status: Nasal cannula     Bed Mobility:    Patient completed Rolling/Turning to Left with  dependent and 2 persons  Patient completed Rolling/Turning to Right with dependent and 2 persons  Patient completed Scooting/Bridging with dependent and 2 persons  Patient completed Supine to Sit with dependent and 2 persons  Patient completed Sit to Supine with dependent and 2 persons     AM-PAC 6 CLICK MOBILITY   6       Treatment & Education:  Pt opening eyes briefly with max cueing and presents with fixed gaze  Pt follows simple one step commands with max stimulation <10% of the time  performed supine<>sit t/f and pt unable to maintain alertness and posteriorly pushing, therefore, pt returned supine  B rolling and scooting performed for repositioning  Pt holds head in L lateral rotation therefore pt placed in R side-lying with head propped towards R lateral cervical rotation for pressure relief and passive stretch / prevention of muscle shortening  Wedge and pillows in place for comfort and B UE elevated / propped on pillows  Increased edema noted in R hand compared to prior sessions - nurse notified and aware    Patient left right sidelying with all lines intact, call button in reach, bed alarm on, and nsg notified..    GOALS:   Multidisciplinary Problems       Physical Therapy Goals          Problem: Physical Therapy    Goal Priority Disciplines Outcome Goal Variances Interventions   Physical Therapy Goal     PT, PT/OT Ongoing, Progressing     Description: Goals to be met by: 23     Patient will increase functional independence with mobility by performin. Supine to sit with Minimal Assistance  2. Sit to supine with Minimal Assistance  3. Sit to stand transfer with Minimal Assistance  4. Bed to chair transfer with Minimal Assistance using Rolling Walker  5. Gait  x 25 feet with Minimal Assistance using Rolling Walker.                          Time Tracking:     PT  Received On: 05/25/23  PT Start Time: 1033     PT Stop Time: 1058  PT Total Time (min): 25 min     Billable Minutes: Therapeutic Activity 25 (cotx with OT)    Treatment Type: Treatment  PT/PTA: PT     Number of PTA visits since last PT visit: 0     05/26/2023

## 2023-05-25 NOTE — PT/OT/SLP PROGRESS
Speech Language Pathology      Marely Hamilton  MRN: [id number]    Patient not seen today for ST 2/2 to pt's lack of ability to maintain an appropriate level of awakeness/alertness for adequate and safe participation with swallowing trials with SLP. SLP communicated this update with RN, MD, and Neurology. SLP recommends NPO/NPO with NGT for primary means of nutrition/hydration/meds at this time. Pt is at an increased risk for aspiration currently. Will follow-up on 5/26/23 or as appropriate. Medical team in agreement.

## 2023-05-25 NOTE — PLAN OF CARE
05/24/23 2104   Patient Request   Patient Requested MEWs score of 5   Nurse Notification   Charge Nurse Notified? Yes   Name of Charge Nurse Lynsey Villafana RN/ Erendira Aponte RN (ICU proactive rounder)   Bedside Nurse Notified? Yes   Name of Bedside Nurse NEHAL Morel   Nurse Notfication Method Secure Chat   Nurse Notified Of Other  (MEWs score of 5)

## 2023-05-25 NOTE — PLAN OF CARE
Recommendations    Recommendations:   1. Continue Isosource 1.5 progrssing toward goal rate of 50 mL/hr with 250 mL water flush QID to provide 1800 kcal (93% assessed needs), 82 g protein (112% assessed needs), and 1916 mL fluid (99% assessed needs).   2. Monitor TF tolerance, weight changes, and labs.   3 . RD to follow.    Goals:   Meet >75% assessed needs through eneteral nutrition by RD follow up.  Nutrition Goal Status: new  Communication of RD Recs: other (comment) (Plan of care)

## 2023-05-25 NOTE — PLAN OF CARE
LEXIE sent kyleigh germainal to [facility] fax. Lexie will continue to follow pt through transitions of care and assist with any discharge needs.    TAMMY Luna  [phone]     05/25/23 1053   Post-Acute Status   Post-Acute Authorization Placement   Coverage [employer]   Discharge Plan   Discharge Plan A Home with family

## 2023-05-25 NOTE — PROGRESS NOTES
[facility] Progress Note    Marely Hamilton  1965  [id number]      Subjective:   Patient is a 58 y.o. female with a pmh of cirrhosis bipolar disorder, epilepsy who was admitted on 5/18/2023 for acute encephalopathy.    Today the patient is similar to yesterday, intermittently follows commands and is able to say short phrases.     ROS: unable to obtain     Objective:  Vitals:    05/25/23 0754   BP: (!) 100/51   Pulse: 65   Resp: 18   Temp: 97.5 °F (36.4 °C)     Scheduled Meds:   ARIPiprazole  5 mg Oral Daily    enoxparin  40 mg Subcutaneous Q24H (prophylaxis, 1700)    folic acid  1 mg Per NG tube Daily    lactulose  30 g Per NG tube BID    levetiracetam  1,000 mg Per NG tube BID    potassium bicarbonate  25 mEq Oral Q4H    potassium phosphate IVPB  30 mmol Intravenous Once    propranoloL  20 mg Per NG tube Daily    rifAXIMin  550 mg Per NG tube BID    thiamine (VITAMIN B1) IVPB  250 mg Intravenous Daily    zonisamide  100 mg Per NG tube Daily       Neurologic Physical Examination:   Orientation  [id number]  Arouses easily, intermittently follows commands, incoherent perseverative speech.   Cranial Nerves  PERRL, oculocephalic reflexes intact  symmetric facial expression,  tongue midline, symmetric palate elevation.  Motor  Normal Bulk  Normal Tone  Localizes to pain in all extremities   Sensory  Localizes to pain in all extremities   DTR   +2 symmetric  Cerebellar/Gait  Unable to asses  Laboratory Findings:   Recent Results (from the past 24 hour(s))   POCT glucose    Collection Time: 05/24/23 11:41 AM   Result Value Ref Range    POCT Glucose 141 (H) 70 - 110 mg/dL   POCT glucose    Collection Time: 05/24/23  5:07 PM   Result Value Ref Range    POCT Glucose 221 (H) 70 - 110 mg/dL   POCT glucose    Collection Time: 05/24/23  8:07 PM   Result Value Ref Range    POCT Glucose 155 (H) 70 - 110 mg/dL   POCT glucose    Collection Time: 05/25/23  5:13 AM   Result Value Ref Range    POCT Glucose 156 (H) 70 - 110 mg/dL    Lithium level    Collection Time: 05/25/23  5:55 AM   Result Value Ref Range    Lithium Level 0.7 0.6 - 1.2 mmol/L   Comprehensive metabolic panel    Collection Time: 05/25/23  5:55 AM   Result Value Ref Range    Sodium 149 (H) 136 - 145 mmol/L    Potassium 3.0 (L) 3.5 - 5.1 mmol/L    Chloride 128 (HH) 95 - 110 mmol/L    CO2 19 (L) 23 - 29 mmol/L    Glucose 105 70 - 110 mg/dL    BUN 13 6 - 20 mg/dL    Creatinine 0.6 0.5 - 1.4 mg/dL    Calcium 8.2 (L) 8.7 - 10.5 mg/dL    Total Protein 3.7 (L) 6.0 - 8.4 g/dL    Albumin 1.4 (L) 3.5 - 5.2 g/dL    Total Bilirubin 2.5 (H) 0.1 - 1.0 mg/dL    Alkaline Phosphatase 148 (H) 55 - 135 U/L    AST 95 (H) 10 - 40 U/L    ALT 57 (H) 10 - 44 U/L    Anion Gap 2 (L) 8 - 16 mmol/L    eGFR >60 >60 mL/min/1.73 m^2   Magnesium    Collection Time: 05/25/23  5:55 AM   Result Value Ref Range    Magnesium 1.7 1.6 - 2.6 mg/dL   CBC auto differential    Collection Time: 05/25/23  5:55 AM   Result Value Ref Range    WBC 8.63 3.90 - 12.70 K/uL    RBC 2.64 (L) 4.00 - 5.40 M/uL    Hemoglobin 8.9 (L) 12.0 - 16.0 g/dL    Hematocrit 28.4 (L) 37.0 - 48.5 %     (H) 82 - 98 fL    MCH 33.7 (H) 27.0 - 31.0 pg    MCHC 31.3 (L) 32.0 - 36.0 g/dL    RDW 19.9 (H) 11.5 - 14.5 %    Platelets 82 (L) 150 - 450 K/uL    MPV 9.7 9.2 - 12.9 fL    Immature Granulocytes 0.3 0.0 - 0.5 %    Gran # (ANC) 6.7 1.8 - 7.7 K/uL    Immature Grans (Abs) 0.03 0.00 - 0.04 K/uL    Lymph # 0.8 (L) 1.0 - 4.8 K/uL    Mono # 0.6 0.3 - 1.0 K/uL    Eos # 0.5 0.0 - 0.5 K/uL    Baso # 0.00 0.00 - 0.20 K/uL    nRBC 0 0 /100 WBC    Gran % 78.1 (H) 38.0 - 73.0 %    Lymph % 9.6 (L) 18.0 - 48.0 %    Mono % 6.7 4.0 - 15.0 %    Eosinophil % 5.3 0.0 - 8.0 %    Basophil % 0.0 0.0 - 1.9 %    Differential Method Automated    Phosphorus    Collection Time: 05/25/23  5:55 AM   Result Value Ref Range    Phosphorus 1.8 (L) 2.7 - 4.5 mg/dL       Neuroimaging:   X-Ray Chest 1 View    Result Date: 5/18/2023  EXAMINATION: XR CHEST 1 VIEW CLINICAL  HISTORY: AMS; TECHNIQUE: Single frontal view of the chest was performed. COMPARISON: Chest radiograph from 05/05/2023. FINDINGS: Mediastinal structures are midline.  Normal mediastinal and hilar contours.  Normal size cardiac silhouette. Lungs are symmetrically expanded.  No focal consolidation.  No pneumothorax.  No significant pleural fluid. Probable cholelithiasis, similar to prior exam.  Osseous structures appear intact.     No acute intrathoracic abnormality. Electronically signed by: Yossi Espinosa Date:    05/18/2023 Time:    12:00    X-Ray Chest 1 View    Result Date: 5/5/2023  EXAMINATION: XR CHEST 1 VIEW CLINICAL HISTORY: Altered mental status; TECHNIQUE: Single frontal view of the chest was performed. COMPARISON: Chest radiograph performed 03/09/2023 FINDINGS: Monitoring leads overlie the chest.  Cardiomediastinal contours grossly unchanged.  Chronic interstitial coarsening, relatively similar appearance when compared to 03/09/2023 radiograph. No definite focal airspace consolidation. No definite pneumothorax or large pleural effusion. No acute findings in the visualized abdomen.  Round to ovoid foci of increased attenuation in the right upper quadrant the abdomen could relate to cholelithiasis.  Osseous and soft tissue structures appear without definite acute change.     No convincing evidence of acute cardiopulmonary disease. Electronically signed by: Mumtaz Penny Date:    05/05/2023 Time:    14:12    CT Head Without Contrast    Result Date: 5/18/2023  EXAMINATION: CT HEAD WITHOUT CONTRAST CLINICAL HISTORY: Mental status change, unknown cause; Hepatic encephalopathy TECHNIQUE: Low dose axial images were obtained through the head.  Coronal and sagittal reformations were also performed. Contrast was not administered. COMPARISON: CT head 05/05/2023. FINDINGS: Blood: No acute intracranial hemorrhage. Parenchyma: No definite loss of gray-white differentiation to suggest acute or subacute transcortical  infarct. Parenchymal volume loss.  Nonspecific areas of white matter hypoattenuation may relate to sequela of chronic small vessel ischemic disease. Ventricles/Extra-axial spaces: No abnormal extra-axial fluid collection. Basal cisterns are patent. Vessels: Moderate atherosclerotic calcification. Orbits: Status post bilateral lens replacements Scalp: Unremarkable. Skull: There are no depressed skull fractures or destructive bone lesions. Sinuses and mastoids: Scattered relatively modest paranasal sinus mucosal thickening with small retention cysts. Other findings: Partially imaged sequela of cosmetic surgery, with suggested silicone implants in the bilateral pre malar soft tissues.     No acute intracranial findings. No significant change relative to prior head CT 05/05/2023. Electronically signed by: Mumtaz Penny Date:    05/18/2023 Time:    14:08    CT Head Without Contrast    Result Date: 5/5/2023  EXAMINATION: CT HEAD WITHOUT CONTRAST CLINICAL HISTORY: Neuro deficit, acute, stroke suspected;Mental status change, unknown cause; Other symptoms and signs involving the nervous system TECHNIQUE: Low dose axial CT images obtained throughout the head without intravenous contrast. Sagittal and coronal reconstructions were performed. COMPARISON: CT head 03/09/2023. FINDINGS: Ventricles and sulci are normal in size for age without evidence of hydrocephalus. No extra-axial blood or fluid collections.  There is hypoattenuation within the supratentorial white matter compatible with chronic microvascular ischemic changes, stable.  No parenchymal mass, hemorrhage, edema or major vascular distribution infarct. No calvarial fracture.  The scalp is unremarkable.  Bilateral paranasal sinuses and mastoid air cells are clear.  There are bilateral prosthetic lenses.  Partially imaged pre maxillary prostheses.     No acute intracranial abnormality. Electronically signed by: Elias Avendano Date:    05/05/2023 Time:    22:11    X-Ray  Chest AP Portable    Result Date: 5/22/2023  EXAMINATION: XR CHEST AP PORTABLE CLINICAL HISTORY: leukocytosis, concern for aspiration; Hepatic encephalopathy TECHNIQUE: Single frontal view of the chest was performed. COMPARISON: 05/18/2023 FINDINGS: Enteric tube distal tip within the proximal stomach, could be slightly advanced.  The cardiac silhouette is midline.  The pulmonary vascularity is normal.  Subsegmental atelectasis or airspace disease left lung base.  No significant pleural effusion.  No pneumothorax.  The osseous structures appear normal.  There are stones noted in the gallbladder.     Enteric tube distal tip within the proximal stomach, could be slightly advanced. Subsegmental atelectasis or airspace disease left lung base. Cholelithiasis. Electronically signed by: Luz Martino MD Date:    05/22/2023 Time:    12:59    US Upper Extremity Veins Left    Result Date: 5/19/2023  EXAMINATION: US UPPER EXTREMITY VEINS LEFT CLINICAL HISTORY: Edema; Unspecified cirrhosis of liver TECHNIQUE: Duplex and color flow Doppler evaluation and dynamic compression was performed of the left upper extremity veins. COMPARISON: None FINDINGS: Central veins: The internal jugular, subclavian, and axillary veins are patent and free of thrombus. Arm veins: The brachial veins are patent and compressible.  The superficial cephalic vein is thrombosed. Contralateral subclavian/internal jugular veins: The right subclavian and internal jugular veins are patent and free of thrombus. Other findings: None.     No DVT in the left upper extremity. Thrombosed left superficial cephalic vein. Electronically signed by: Jose J Mora MD Date:    05/19/2023 Time:    13:14    US Abdomen Limited    Result Date: 5/19/2023  EXAMINATION: US ABDOMEN LIMITED CLINICAL HISTORY: Ascites, varices; Hepatic encephalopathy TECHNIQUE: Limited ultrasound of the right upper quadrant of the abdomen (including pancreas, liver, gallbladder, common bile duct,  and right kidney) was performed. COMPARISON: None. FINDINGS: The visualized portions of the pancreas, abdominal aorta, and IVC are unremarkable. Gallbladder: Multiple gallstones.  No pericholecystic fluid or gallbladder wall thickening..  Negative sonographic Tang's sign. Biliary system: The common duct is within normal limits, measuring 3 mm.  No intrahepatic ductal dilatation. Liver: Measures 7.9 cm.  There is homogeneous echotexture. No focal hepatic lesions. Spleen: Normal in size  measuring 10.8 cm. No ascites identified.     Cholelithiasis. No ascites identified. Electronically signed by: Jose J Mora MD Date:    05/19/2023 Time:    13:16    XR Gastric tube check, non-radiologist performed    Result Date: 5/19/2023  EXAMINATION: XR GASTRIC TUBE CHECK, NON-RADIOLOGIST PERFORMED CLINICAL HISTORY: Ng tube placement; TECHNIQUE: Limited chest abdomen COMPARISON: None FINDINGS: NG tube tip just past the GE junction.  Small amount of oral contrast noted in the gastric body.  No dilated loops of bowel.     No acute findings. Electronically signed by: Jose J Mora MD Date:    05/19/2023 Time:    11:16          Assessment/Plan:     Marely Hamilton is a 58 y.o.  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     Acute encephalopathy  -Continue treatment of hepatic encephalopathy with lactulose and rifaximin.  -appreciate psychiatry's assistance  -Will await results of EEG and MRI brain.     Will continue to follow and monitor progression of current neurologic condition.          Gaetano Levin MD,   [facility] Neu[facility] PGY 3

## 2023-05-25 NOTE — NURSING
"PROACTIVE ROUNDING NOTE     Time of Visit:     Admit Date: 2023  LOS: 6  Code Status: Full Code   Date of Visit: 2023  : 1965  Age: 58 y.o.  Sex: female  Race: White  Bed: K485/K485 A:   MRN: [id number]  Was the patient discharged from an ICU this admission? No   Was the patient discharged from a PACU within last 24 hours?  No  Did the patient receive conscious sedation/general anesthesia in last 24 hours?  No  Was the patient in the ED within the past 24 hours?  No  Was the patient started on NIPPV within the past 24 hours?  No  Attending Physician: Sarita Faith MD  Primary Service: [facility] TEAM A    ASSESSMENT     Noted Charge nurse Agusto at bedside, during rounding.  Rectal temperature being taken.        Diagnosis: Acute encephalopathy    Abnormal Vital Signs: BP (!) 93/54 (BP Location: Left arm, Patient Position: Lying)   Pulse (!) 50   Temp (!) 94.6 °F (34.8 °C) (Rectal)   Resp 18   Ht 5' 2" (1.575 m)   Wt 42.9 kg (94 lb 9.2 oz)   SpO2 96%   BMI 17.30 kg/m²      Clinical Issues:  hepatic encephalopathy     Patient  has a past medical history of Addiction to drug, Alcohol abuse, Alcohol abuse, in remission, Anemia, Anxiety, Behavioral problem, Bipolar disorder, Bipolar disorder in remission, Cirrhosis, Laennec's, Depression, Encounter for blood transfusion, Epistaxis, Fatigue, Glaucoma, Hematuria, Hepatic encephalopathy, Hepatic enlargement, History of psychiatric care, History of psychiatric hospitalization, History of seizure, hx of intentional Tylenol overdose, psychiatric care, Macrocytic anemia, Jeana, Osteoarthritis, Other ascites, Psychiatric exam requested by authority, Psychiatric problem, Psychosis, Renal disorder, Seizures, Self-harming behavior, Suicide attempt, Therapy, and Thrombocytopenia.           INTERVENTIONS/ RECOMMENDATIONS     Warming blanket.    Discussed plan of care with RNagusto.    PHYSICIAN ESCALATION     Yes/No  No    Orders received " and case discussed with NA.    Disposition: Remain in room 485.    FOLLOW-UP     Call back the proactive round nurse, Erendira Aponte at [phone] for additional questions or concerns.

## 2023-05-25 NOTE — PLAN OF CARE
Problem: Occupational Therapy  Goal: Occupational Therapy Goal  Description: Goals to be met by: 6/23/2023     Patient will increase functional independence with ADLs by performing:    *new goal 5/23/2023: following one step simple command with 25% accuracy  Feeding with Set-up Assistance pending NG status and overarching ST ingestion recs.   UE Dressing with Minimal Assistance.  LE Dressing with Moderate Assistance.  Grooming while seated with Set-up Assistance.  Toileting from bedside commode with Stand-by Assistance for hygiene and clothing management.   Bathing seated UB sponge with Stand-by Assistance.  Toilet transfer to bedside commode with Minimal Assistance.  Increased functional strength to WFL as needed for recovery of effective use of UB as a stabilizer in routine ADL transfers.   Upper extremity exercise program x10 reps per handout, with assistance as needed.    Outcome: Ongoing, Progressing   Marely Hamilton is a 58 y.o. female with a medical diagnosis of Acute encephalopathy.   Performance deficits affecting function are weakness, impaired endurance, impaired self care skills, impaired functional mobility, gait instability, impaired balance, impaired cognition, decreased coordination, decreased upper extremity function, decreased lower extremity function, decreased safety awareness, pain, abnormal tone, decreased ROM, impaired coordination, impaired fine motor, impaired skin, edema, impaired cardiopulmonary response to activity.   Pt found in bed, asleep, arouses briefly with verbal and tactile stimulation.  Pt was dependent with 2 assist to transition to EOB. She was unable to maintain a wakeful state to participate in therapy therefore return to supine.  RN notified. Continue OT services to address functional goals, progressing as able.

## 2023-05-25 NOTE — CONSULTS
PSYCHIATRY INPATIENT CONSULT NOTE      5/25/2023 7:20 AM   Marely Hamilton   1965   [id number]           DATE OF ADMISSION: 5/18/2023 11:12 AM    SITE: [facility]    CURRENT LEGAL STATUS: Patient does not currently meet PEC criteria due to not currently being an imminent threat to self/others and not being gravely disabled 2/2 mental illness at this time.     HISTORY    Per Initial History from Primary Team:   Altered Mental Status (CNA called EMS for AMS, GCS 14 pt confused to situation, jaundice, pt denies any complaints)  Marely Hamilton is a 58 y.o. female w/ PMHx of EtOH induced cirrhosis, seizure on AEDs, and bipolar disorder who presented to [facility] on 5/18/2023 with a primary complaint of altered mental status for 1 day.  The patient was in their usual state of health until earlier today when her sitter (CNA) found her to be altered. She is unable to give any pertinent hx d/t her altered status.  She has a hx of episodic non-compliance w/ home medications including lactulose and psych meds. At baseline, she is ambulatory and able to perform ADLs.  Interval History from Primary Team on 05/25/2023:  Pt remains encephalopathic. Updating sister daily by telephone. Minimal participation with PT/OT/SLP yesterday and some oral intake.       Chief Complaint / Reason for Psychiatry Consult: encephalopathic due to hepatic encephalopathy and hypercalcemia, bipolar with history of psychosis, on chronic lithium with questionable adherence; recommendations fro alternate therapy for bipolar due to hypercalcemia      HPI:   Marely Hamilton is a 58 y.o. female with a past medical history of alcoholic cirrhosis, hepatic encephalopathy, and seizure, and a past psychiatric history of Bipolar I Disorder and Alcohol Use Disorder, currently being treated by her inpatient primary team for a principle problem of Hepatic / Metabolic Encephalopathy and Hypercalcemia.  Psychiatry was originally consulted  as noted above.  The patient was seen and examined.  The chart was reviewed.  On examination today, the patient was notably somnolent and only oriented to person and state.  She was disoriented to place, city, month, year, and situation.  She was CAM-ICU positive for delirium.  She made limited eye contact and only could intermittently follow verbal commands today.  Despite multiple attempts, the comprehensive psychiatric assessment was notably limited due to the patient's current AMS / encephalopathy.  NAD was observed during the examination.  Psychotherapy was implemented as noted below with a focus on improving orientation, confusion, and mood.  See A/P below.       Collateral:    Per CM / SW:  Pt flagged by [facility] that pt will require a level 2 PASRR. Sw faxed available information to [facility]. Pt will need psych evaluation. SW will continue to follow pt through transitions of care and assist with any discharge needs.      Psychiatric Review Of Systems - Currently, the patient is endorsing and/or denying the following:  Attempted but unable to assess due to patient's acute AMS / Encephalopathy       PSYCHOTHERAPY ADD-ON [phone]   30 (16-37*) minutes    Time: 19 minutes  Participants: Met with patient    Therapeutic Intervention Type: behavior modifying psychotherapy, supportive psychotherapy  Why chosen therapy is appropriate versus another modality: relevant to diagnosis, patient responds to this modality, evidence based practice    Target symptoms: disorientation, confusion, and mood   Primary focus: improving orientation, confusion, and mood   Psychotherapeutic techniques: supportive and psychodynamic techniques; psycho-education; re-orientation; behavioral modification; reality / insight orientation; CBT; problem solving techniques and managing life / medical stressors    Outcome monitoring methods: self-report, observation    Patient's response to intervention:  The patient's response to intervention is limited.       Progress toward goals:  The patient's progress toward goals is limited.        ROS:  General ROS: negative for - chills, fever or night sweats; positive for fatigue  Ophthalmic ROS: negative for - blurry vision, double vision or eye pain  ENT ROS: negative for - sinus pain, headaches, sore throat or visual changes  Allergy and Immunology ROS: negative for - hives, itchy/watery eyes or nasal congestion  Hematological and Lymphatic ROS: negative for - bleeding problems, bruising, jaundice or pallor  Endocrine ROS: negative for - galactorrhea, hot flashes, mood swings, palpitations or temperature intolerance  Respiratory ROS: negative for - cough, hemoptysis, shortness of breath, tachypnea or wheezing  Cardiovascular ROS: negative for - chest pain, dyspnea on exertion, loss of consciousness, palpitations, rapid heart rate or shortness of breath  Gastrointestinal ROS: negative for - appetite loss, nausea, abdominal pain, blood in stools, change in bowel habits, constipation or diarrhea  Genito-Urinary ROS: negative for - incontinence, nocturia or pelvic pain  Musculoskeletal ROS: negative for - joint stiffness, joint swelling, joint pain or muscle pain   Neurological ROS: negative for - dizziness, numbness/tingling or seizures; positive for behavioral changes, confusion, & memory loss  Dermatological ROS: negative for dry skin, hair changes, pruritus or rash  Psychiatric ROS: see detailed psychiatric ROS above in history section       Below history compiled via chart review due to patient's current encephalopathy:    Past Psychiatric History:  Previous Medication Trials: yes, multiple  Previous Psychiatric Hospitalizations: yes, multiple   Previous Suicide Attempts: 3 previous SA via OD  History of Violence: denies   Outpatient Psychiatrist: Dr. Dewey Coates with [facility]   Hx of Depression: yes  Hx of Anxiety: yes  Hx of Jeana: yes  Hx of Psychosis: denies   Hx of PTSD: denies      Social History:  Marital  Status: single  Children: 0 (has two sisters)   Employment Status/Info: on disability  Education: post college graduate work or degree (EDIN from [employer])  Special Ed: denies  : denies  [place] [facility] [place]isure time: yes   History of phys/sexual abuse: denies  Access to gun: denies     Family Psychiatric History: father with bipolar disorder ; father and sister with alcoholism      Substance Abuse History:  Recreational Drugs: denies  Use of Alcohol: hx of heavy alcohol abuse / dependence with resultant cirrhosis ; endorses total sobriety for multiple years (unable to give specific #) ; endorses hx of complicated withdrawal   Rehab History: yes  Tobacco Use: denies      Legal History:  Past Charges/Incarcerations: prior DUI   Pending charges: denies       Psychosocial Stressors: financial, health, and occupational   Functioning Relationships: good support system in family  Strengths AND Liabilities: Strength: Patient has positive support network., Liability: Patient has poor health., Liability: Patient has possible cognitive impairment., Liability: Patient lacks coping skills.      PAST MEDICAL & SURGICAL HISTORY   Past Medical History:   Diagnosis Date    Addiction to drug     Alcohol abuse     Alcohol abuse, in remission 6/15/2015    14.5 weeks ago; AA weekly    Anemia     Anxiety 6/15/2015    Behavioral problem     Bipolar disorder     Bipolar disorder in remission 6/15/2015    Cirrhosis, Laennec's 6/15/2015    Depression     Encounter for blood transfusion     Epistaxis 6/15/2015    Fatigue     Glaucoma     Hematuria     Hepatic encephalopathy 6/15/2015    Hepatic enlargement     History of psychiatric care     History of psychiatric hospitalization     History of seizure 6/15/2015    1    hx of intentional Tylenol overdose 6/15/2015    2005- situational and hx of bipolar    Hx of psychiatric care     Macrocytic anemia 9/18/2015    6 units PRBC since June 2015     Jeana     Osteoarthritis     Other ascites 6/15/2015    Psychiatric exam requested by authority     Psychiatric problem     Psychosis 9/26/2019    Renal disorder     Seizures     Self-harming behavior     Suicide attempt     Therapy     Thrombocytopenia 6/15/2015     Past Surgical History:   Procedure Laterality Date    COSMETIC SURGERY      ESOPHAGOGASTRODUODENOSCOPY         NEUROLOGIC HISTORY  Seizures: yes   Head trauma with LOC: denies   CVA: denies       FAMILY HISTORY   Family History   Problem Relation Age of Onset    Alcohol abuse Father     Suicide Father     Bipolar disorder Father     Alcohol abuse Sister     Hypertension Mother     Alcohol abuse Paternal Grandfather     Drug abuse Neg Hx     Dementia Neg Hx        ALLERGIES   Review of patient's allergies indicates:   Allergen Reactions    Sulfa (sulfonamide antibiotics) Rash    Codeine Nausea And Vomiting       CURRENT MEDICATION REGIMEN   Home Meds:   Prior to Admission medications    Medication Sig Start Date End Date Taking? Authorizing Provider   CONSTULOSE 10 gram/15 mL solution Take 45 mLs by mouth 2 (two) times daily. 4/17/23   Historical Provider   folic acid (FOLVITE) 1 MG tablet Take 1 tablet (1 mg total) by mouth once daily. 2/13/23 2/13/24  Seth Noyola MD   furosemide (LASIX) 20 MG tablet Take 10 mg by mouth 2 (two) times daily.    Historical Provider   levETIRAcetam (KEPPRA) 500 MG Tab Take 2 tablets (1,000 mg total) by mouth 2 (two) times daily. 3/3/23   Robina Esquivel NP   lithium (LITHOTAB) 300 mg tablet Take 300 mg by mouth once daily. 4/10/23   Historical Provider   multivitamin with iron (HAIR VITAMINS ORAL) Take by mouth.    Historical Provider   OLANZapine (ZYPREXA) 5 MG tablet Take 5 mg by mouth every evening. 4/26/23   Historical Provider   propranoloL (INDERAL) 20 MG tablet Take 20 mg by mouth once daily. 12/3/22   Historical Provider   QUEtiapine (SEROQUEL) 100 MG Tab Take 100 mg by mouth every evening. 4/10/23    Historical Provider   rifAXIMin (XIFAXAN) 550 mg Tab Take 1 tablet (550 mg total) by mouth 2 (two) times daily. 3/13/23 6/11/23  Driss Hallman MD   sodium bicarbonate 650 MG tablet Take 2 tablets (1,300 mg total) by mouth 2 (two) times daily. 2/14/23 2/14/24  Seth Noyola MD   spironolactone (ALDACTONE) 25 MG tablet Take 25 mg by mouth once daily. 12/3/22   Historical Provider   thiamine 100 MG tablet Take 1 tablet (100 mg total) by mouth once daily. 3/3/23   Robina Esquivel NP   zonisamide (ZONEGRAN) 100 MG Cap Take 100 mg by mouth once daily. 8/12/22   Historical Provider       OTC Meds: denies     Scheduled Meds:    folic acid  1 mg Per NG tube Daily    lactulose  30 g Per NG tube BID    levetiracetam  1,000 mg Per NG tube BID    magnesium sulfate IVPB  2 g Intravenous Once    OLANZapine  5 mg Per NG tube QHS    potassium bicarbonate  25 mEq Oral Q4H    potassium phosphate IVPB  30 mmol Intravenous Once    propranoloL  20 mg Per NG tube Daily    QUEtiapine  100 mg Per NG tube QHS    rifAXIMin  550 mg Per NG tube BID    thiamine (VITAMIN B1) IVPB  250 mg Intravenous Daily    zonisamide  100 mg Per NG tube Daily      PRN Meds: dextrose 10%, dextrose 10%, dextrose, dextrose, glucagon (human recombinant), insulin aspart U-100, melatonin, sodium chloride 0.9%   Psychotherapeutics (From admission, onward)      Start     Stop Route Frequency Ordered    05/19/23 2100  OLANZapine tablet 5 mg         -- PER NG TUBE Nightly 05/19/23 1802 05/19/23 2100  QUEtiapine tablet 100 mg         -- PER NG TUBE Nightly 05/19/23 1802            LABORATORY DATA   Recent Results (from the past 72 hour(s))   POCT glucose    Collection Time: 05/22/23 11:42 AM   Result Value Ref Range    POCT Glucose 125 (H) 70 - 110 mg/dL   POCT glucose    Collection Time: 05/22/23  6:10 PM   Result Value Ref Range    POCT Glucose 154 (H) 70 - 110 mg/dL   POCT glucose    Collection Time: 05/22/23  8:47 PM   Result Value Ref Range    POCT  Glucose 125 (H) 70 - 110 mg/dL   POCT glucose    Collection Time: 05/22/23 11:42 PM   Result Value Ref Range    POCT Glucose 151 (H) 70 - 110 mg/dL   POCT glucose    Collection Time: 05/23/23  4:44 AM   Result Value Ref Range    POCT Glucose 153 (H) 70 - 110 mg/dL   Lithium level    Collection Time: 05/23/23  9:29 AM   Result Value Ref Range    Lithium Level 1.4 (H) 0.6 - 1.2 mmol/L   CBC auto differential    Collection Time: 05/23/23  9:29 AM   Result Value Ref Range    WBC 16.87 (H) 3.90 - 12.70 K/uL    RBC 3.21 (L) 4.00 - 5.40 M/uL    Hemoglobin 10.9 (L) 12.0 - 16.0 g/dL    Hematocrit 34.0 (L) 37.0 - 48.5 %     (H) 82 - 98 fL    MCH 34.0 (H) 27.0 - 31.0 pg    MCHC 32.1 32.0 - 36.0 g/dL    RDW 19.3 (H) 11.5 - 14.5 %    Platelets 114 (L) 150 - 450 K/uL    MPV 9.6 9.2 - 12.9 fL    Immature Granulocytes 0.5 0.0 - 0.5 %    Gran # (ANC) 13.1 (H) 1.8 - 7.7 K/uL    Immature Grans (Abs) 0.09 (H) 0.00 - 0.04 K/uL    Lymph # 1.9 1.0 - 4.8 K/uL    Mono # 1.1 (H) 0.3 - 1.0 K/uL    Eos # 0.6 (H) 0.0 - 0.5 K/uL    Baso # 0.02 0.00 - 0.20 K/uL    nRBC 0 0 /100 WBC    Gran % 77.7 (H) 38.0 - 73.0 %    Lymph % 11.5 (L) 18.0 - 48.0 %    Mono % 6.8 4.0 - 15.0 %    Eosinophil % 3.4 0.0 - 8.0 %    Basophil % 0.1 0.0 - 1.9 %    Differential Method Automated    Comprehensive metabolic panel    Collection Time: 05/23/23  9:29 AM   Result Value Ref Range    Sodium 147 (H) 136 - 145 mmol/L    Potassium 3.4 (L) 3.5 - 5.1 mmol/L    Chloride 119 (H) 95 - 110 mmol/L    CO2 25 23 - 29 mmol/L    Glucose 154 (H) 70 - 110 mg/dL    BUN 19 6 - 20 mg/dL    Creatinine 0.9 0.5 - 1.4 mg/dL    Calcium 12.2 (HH) 8.7 - 10.5 mg/dL    Total Protein 4.8 (L) 6.0 - 8.4 g/dL    Albumin 1.8 (L) 3.5 - 5.2 g/dL    Total Bilirubin 2.2 (H) 0.1 - 1.0 mg/dL    Alkaline Phosphatase 196 (H) 55 - 135 U/L    AST 96 (H) 10 - 40 U/L    ALT 54 (H) 10 - 44 U/L    Anion Gap 3 (L) 8 - 16 mmol/L    eGFR >60 >60 mL/min/1.73 m^2   Magnesium    Collection Time: 05/23/23  9:29  AM   Result Value Ref Range    Magnesium 2.4 1.6 - 2.6 mg/dL   Phosphorus    Collection Time: 05/23/23  9:29 AM   Result Value Ref Range    Phosphorus 1.9 (L) 2.7 - 4.5 mg/dL   POCT glucose    Collection Time: 05/23/23 11:23 AM   Result Value Ref Range    POCT Glucose 154 (H) 70 - 110 mg/dL   POCT glucose    Collection Time: 05/23/23  4:47 PM   Result Value Ref Range    POCT Glucose 170 (H) 70 - 110 mg/dL   Basic metabolic panel    Collection Time: 05/23/23  6:09 PM   Result Value Ref Range    Sodium 147 (H) 136 - 145 mmol/L    Potassium 4.8 3.5 - 5.1 mmol/L    Chloride 121 (H) 95 - 110 mmol/L    CO2 21 (L) 23 - 29 mmol/L    Glucose 159 (H) 70 - 110 mg/dL    BUN 17 6 - 20 mg/dL    Creatinine 0.9 0.5 - 1.4 mg/dL    Calcium 11.0 (H) 8.7 - 10.5 mg/dL    Anion Gap 5 (L) 8 - 16 mmol/L    eGFR >60 >60 mL/min/1.73 m^2   Phosphorus    Collection Time: 05/23/23  6:09 PM   Result Value Ref Range    Phosphorus 3.4 2.7 - 4.5 mg/dL   POCT glucose    Collection Time: 05/23/23 11:23 PM   Result Value Ref Range    POCT Glucose 126 (H) 70 - 110 mg/dL   POCT glucose    Collection Time: 05/24/23  5:57 AM   Result Value Ref Range    POCT Glucose 93 70 - 110 mg/dL   Comprehensive metabolic panel    Collection Time: 05/24/23  8:02 AM   Result Value Ref Range    Sodium 149 (H) 136 - 145 mmol/L    Potassium 3.0 (L) 3.5 - 5.1 mmol/L    Chloride 125 (H) 95 - 110 mmol/L    CO2 23 23 - 29 mmol/L    Glucose 96 70 - 110 mg/dL    BUN 16 6 - 20 mg/dL    Creatinine 0.6 0.5 - 1.4 mg/dL    Calcium 9.8 8.7 - 10.5 mg/dL    Total Protein 4.3 (L) 6.0 - 8.4 g/dL    Albumin 1.6 (L) 3.5 - 5.2 g/dL    Total Bilirubin 2.5 (H) 0.1 - 1.0 mg/dL    Alkaline Phosphatase 162 (H) 55 - 135 U/L    AST 87 (H) 10 - 40 U/L    ALT 55 (H) 10 - 44 U/L    Anion Gap 1 (L) 8 - 16 mmol/L    eGFR >60 >60 mL/min/1.73 m^2   CBC auto differential    Collection Time: 05/24/23  8:02 AM   Result Value Ref Range    WBC 9.16 3.90 - 12.70 K/uL    RBC 2.88 (L) 4.00 - 5.40 M/uL     Hemoglobin 9.6 (L) 12.0 - 16.0 g/dL    Hematocrit 31.1 (L) 37.0 - 48.5 %     (H) 82 - 98 fL    MCH 33.3 (H) 27.0 - 31.0 pg    MCHC 30.9 (L) 32.0 - 36.0 g/dL    RDW 19.1 (H) 11.5 - 14.5 %    Platelets 73 (L) 150 - 450 K/uL    MPV 10.0 9.2 - 12.9 fL    Immature Granulocytes 0.4 0.0 - 0.5 %    Gran # (ANC) 7.4 1.8 - 7.7 K/uL    Immature Grans (Abs) 0.04 0.00 - 0.04 K/uL    Lymph # 0.9 (L) 1.0 - 4.8 K/uL    Mono # 0.5 0.3 - 1.0 K/uL    Eos # 0.3 0.0 - 0.5 K/uL    Baso # 0.01 0.00 - 0.20 K/uL    nRBC 0 0 /100 WBC    Gran % 81.1 (H) 38.0 - 73.0 %    Lymph % 10.0 (L) 18.0 - 48.0 %    Mono % 5.0 4.0 - 15.0 %    Eosinophil % 3.4 0.0 - 8.0 %    Basophil % 0.1 0.0 - 1.9 %    Differential Method Automated    Lithium level    Collection Time: 05/24/23  8:02 AM   Result Value Ref Range    Lithium Level 0.9 0.6 - 1.2 mmol/L   Phosphorus    Collection Time: 05/24/23  8:02 AM   Result Value Ref Range    Phosphorus 2.4 (L) 2.7 - 4.5 mg/dL   Magnesium    Collection Time: 05/24/23  8:02 AM   Result Value Ref Range    Magnesium 2.0 1.6 - 2.6 mg/dL   Calcium, ionized    Collection Time: 05/24/23  8:02 AM   Result Value Ref Range    Ionized Calcium 1.52 (H) 1.06 - 1.42 mmol/L   TSH    Collection Time: 05/24/23  8:02 AM   Result Value Ref Range    TSH 0.582 0.400 - 4.000 uIU/mL   POCT glucose    Collection Time: 05/24/23 11:41 AM   Result Value Ref Range    POCT Glucose 141 (H) 70 - 110 mg/dL   POCT glucose    Collection Time: 05/24/23  5:07 PM   Result Value Ref Range    POCT Glucose 221 (H) 70 - 110 mg/dL   POCT glucose    Collection Time: 05/24/23  8:07 PM   Result Value Ref Range    POCT Glucose 155 (H) 70 - 110 mg/dL   POCT glucose    Collection Time: 05/25/23  5:13 AM   Result Value Ref Range    POCT Glucose 156 (H) 70 - 110 mg/dL   Lithium level    Collection Time: 05/25/23  5:55 AM   Result Value Ref Range    Lithium Level 0.7 0.6 - 1.2 mmol/L   Comprehensive metabolic panel    Collection Time: 05/25/23  5:55 AM   Result  Value Ref Range    Sodium 149 (H) 136 - 145 mmol/L    Potassium 3.0 (L) 3.5 - 5.1 mmol/L    Chloride 128 (HH) 95 - 110 mmol/L    CO2 19 (L) 23 - 29 mmol/L    Glucose 105 70 - 110 mg/dL    BUN 13 6 - 20 mg/dL    Creatinine 0.6 0.5 - 1.4 mg/dL    Calcium 8.2 (L) 8.7 - 10.5 mg/dL    Total Protein 3.7 (L) 6.0 - 8.4 g/dL    Albumin 1.4 (L) 3.5 - 5.2 g/dL    Total Bilirubin 2.5 (H) 0.1 - 1.0 mg/dL    Alkaline Phosphatase 148 (H) 55 - 135 U/L    AST 95 (H) 10 - 40 U/L    ALT 57 (H) 10 - 44 U/L    Anion Gap 2 (L) 8 - 16 mmol/L    eGFR >60 >60 mL/min/1.73 m^2   Magnesium    Collection Time: 05/25/23  5:55 AM   Result Value Ref Range    Magnesium 1.7 1.6 - 2.6 mg/dL   CBC auto differential    Collection Time: 05/25/23  5:55 AM   Result Value Ref Range    WBC 8.63 3.90 - 12.70 K/uL    RBC 2.64 (L) 4.00 - 5.40 M/uL    Hemoglobin 8.9 (L) 12.0 - 16.0 g/dL    Hematocrit 28.4 (L) 37.0 - 48.5 %     (H) 82 - 98 fL    MCH 33.7 (H) 27.0 - 31.0 pg    MCHC 31.3 (L) 32.0 - 36.0 g/dL    RDW 19.9 (H) 11.5 - 14.5 %    Platelets 82 (L) 150 - 450 K/uL    MPV 9.7 9.2 - 12.9 fL    Immature Granulocytes 0.3 0.0 - 0.5 %    Gran # (ANC) 6.7 1.8 - 7.7 K/uL    Immature Grans (Abs) 0.03 0.00 - 0.04 K/uL    Lymph # 0.8 (L) 1.0 - 4.8 K/uL    Mono # 0.6 0.3 - 1.0 K/uL    Eos # 0.5 0.0 - 0.5 K/uL    Baso # 0.00 0.00 - 0.20 K/uL    nRBC 0 0 /100 WBC    Gran % 78.1 (H) 38.0 - 73.0 %    Lymph % 9.6 (L) 18.0 - 48.0 %    Mono % 6.7 4.0 - 15.0 %    Eosinophil % 5.3 0.0 - 8.0 %    Basophil % 0.0 0.0 - 1.9 %    Differential Method Automated    Phosphorus    Collection Time: 05/25/23  5:55 AM   Result Value Ref Range    Phosphorus 1.8 (L) 2.7 - 4.5 mg/dL      Lab Results   Component Value Date    PHENOBARB <2.0 (L) 01/15/2017         EXAMINATION    VITALS   Vitals:    05/25/23 0431 05/25/23 0516 05/25/23 0718 05/25/23 0754   BP:  (!) 103/57  (!) 100/51   BP Location:    Left arm   Patient Position:  Lying  Lying   Pulse: (!) 59 60  65   Resp:  16  18    Temp:  96.3 °F (35.7 °C)  97.5 °F (36.4 °C)   TempSrc:  Axillary  Axillary   SpO2:  95% (!) 94% (!) 93%   Weight:       Height:            CONSTITUTIONAL  General Appearance: NAD, unremarkable, age appropriate, lying in bed, thin, calm, somnolent     MUSCULOSKELETAL  Muscle Strength and Tone: Attempted but unable to assess due to acute encephalopathy   Abnormal Involuntary Movements: none observed   Gait and Station: Attempted but unable to assess due to acute encephalopathy     PSYCHIATRIC   Behavior/Cooperation:  reluctant to participate, uncooperative, psychomotor retardation, eye contact minimal  Speech:  slowed, increased latency of response, soft, paucity   Language: grossly intact with spontaneous speech  Mood: Attempted but unable to assess due to acute encephalopathy   Affect:  Blunted   Associations: intermittent TRISTAN  Thought Process: Confused / Illogical   Thought Content: Attempted but unable to assess due to acute encephalopathy   Sensorium: Delirium/Somnolence  Alert and Oriented: to person and state ; disoriented to place, city, month, year, and situation   Memory: Attempted but unable to assess due to acute encephalopathy   Attention/concentration: Impaired / Limited. Unable to spell [id number] & [id number].   Similarities: Impaired / Limited (difference between apple and orange?)  Abstract reasoning: Impaired / Limited   Fund of Knowledge: Attempted but unable to assess due to acute encephalopathy   Insight: Impaired / Limited   Judgment: Impaired / Limited     CAM ICU Delirium Assessment - POSITIVE    Is the patient aware of the biomedical complications associated with substance abuse and mental illness?   Attempted but unable to assess due to acute encephalopathy         MEDICAL DECISION MAKING     ASSESSMENT        Hepatic Encephalopathy   Acute Metabolic Encephalopathy    Delirium due to Medical Condition (Hepatic & Metabolic Encephalopathy) Without Behavioral Disturbances   Bipolar Affective  Disorder   Alcohol Use Disorder, Severe, Dependence, In Sustained Remission         RECOMMENDATIONS       - Patient does not currently meet PEC criteria due to not being an imminent threat to self/others and not being gravely disabled 2/2 mental illness at this time.      - With reasonable medical certainty, based on a present state examination, the patient currently DOES NOT appear to have medical decision-making capacity as made evident by her INABILITY to cognitively utilize information provided to her to express a choice that is stable over time, to understand the relevant information pertaining her diagnoses and recommended treatments, to appreciate the consequences of the decision (risks vs benefits) regarding accepting vs refusing treatment, or to manipulate all of the data in a logical fashion.     - Discontinue Lithium due to presenting hypercalcemia (attempted to discuss risks/benefits/alt vs no treatment with patient).    - HOLD scheduled Seroquel and Zyprexa at this time due to patient's encephalopathy with excessive somnolence (attempted to discuss risks/benefits/alt vs no treatment with patient).     - Restart Abilify 5 mg PO daily for mood while patient is recovering from hepatic / metabolic encephalopathy / delirium (attempted to discuss risks/benefits/alt vs no treatment with patient).     Implement the below DELIRIUM BEHAVIOR MANAGEMENT:  - Minimize use of restraints; if physical restraints necessary, please utilize medical/chemical prns for agitation (can use Zyprexa 2.5 mg to 5 mg PO/IM q8 hours PRN).  - Keep shades open and room lit during day and room dim at night in order to promote healthy circadian rhythms.  - Encourage family at bedside.  - Keep whiteboard in patient's room current with the date and name of the members of patient's team for easy patient self re-orientation.  - Avoid benzodiazepines, antihistamines, anticholinergics, hypnotics, and minimize opiates while controlling for pain  as these medications may exacerbate delirium.      - Patient's most recent resulted labs, imaging, and EKG were reviewed today ; Lithium level today is trending down to 0.7 ; UDS negative ; Ethanol < 10 ; Primary team addressing multiple metabolic derangements     - Psychotherapy was implemented as noted above with a focus on improving orientation, confusion, and mood.     - Please have Hospital CM/SW assist patient with asap outpatient mental health f/u for s/p discharge from this facility (with patient's longstanding psychiatrist, Dewey Coates MD).     - Patient was instructed to call 911 and/or [phone], and return to the nearest ED if she begins feeling suicidal, homicidal, or gravely disabled (for s/p this hospitalization).       - Thank you for this consult ; will continue to follow patient while at [facility]         Total time spent with patient and/or managing/coordinating patient's care today (excluding the time spent on psychotherapy): 77 minutes   Time spent on psychotherapy today (as noted above): 19 minutes   Total time for encounter today including psychotherapy: 96 minutes      More than 50% of the time was spent counseling/coordinating care.     Consulting clinician was informed of the encounter and consult note.       STAFF:  Alfredito Aguayo MD  [facility]   5/25/2023

## 2023-05-25 NOTE — PLAN OF CARE
Problem: Adult Inpatient Plan of Care  Goal: Optimal Comfort and Wellbeing  Outcome: Ongoing, Progressing     Problem: Seizure Disorder Comorbidity  Goal: Maintenance of Seizure Control  Outcome: Ongoing, Progressing     Problem: Infection (Liver Failure)  Goal: Absence of Infection Signs and Symptoms  Outcome: Ongoing, Progressing      I personally performed the service described in the documentation recorded by the scribe in my presence, and it accurately and completely records my words and actions.

## 2023-05-25 NOTE — PROGRESS NOTES
[facility] - Telemetry  Adult Nutrition  Progress Note    SUMMARY       Recommendations    Recommendations:   1. Continue Isosource 1.5 progrssing toward goal rate of 50 mL/hr with 250 mL water flush QID to provide 1800 kcal (93% assessed needs), 82 g protein (112% assessed needs), and 1916 mL fluid (99% assessed needs).   2. Monitor TF tolerance, weight changes, and labs.   3 . RD to follow.    Goals:   Meet >75% assessed needs through eneteral nutrition by RD follow up.  Nutrition Goal Status: new  Communication of RD Recs: other (comment) (Plan of care)    Assessment and Plan    Endocrine  Severe protein-calorie malnutrition  Malnutrition Type:  Context: Acute  Level: Severe    Related to (etiology):   Hepatic encephalopathy  Altered mental status    Signs and Symptoms (as evidenced by):   Wt loss of 28.55% x 3 months  Severe buccal, orbital, and temporal depletion    Malnutrition Characteristic Summary:  Wt loss of 28.55% x 3 months  Severe buccal, orbital, and temporal depletion    Interventions/Recommendations (treatment strategy):  1. Initiate nutrition when medically able - consult RD for recs as needed.   2. Monitor NPO status, weight changes, and labs.   3. RD to follow.    Nutrition Diagnosis Status:   Continues         Malnutrition Assessment    Orbital Region (Subcutaneous Fat Loss): severe depletion   Druze Region (Muscle Loss): severe depletion       Reason for Assessment    Reason For Assessment: RD follow-up  Diagnosis: liver disease, infection/sepsis  Relevant Medical History: Addiction to Drug, ETOH Abuse, Anemia, Anxiety, Behavioral Disorder, Bipolar Disorder, Cirrhosis, Depression, Hepatic Encephalopathy, Suicide Attempt, Psychosis, Self-Harming Behavior, Jeana  Interdisciplinary Rounds: did not attend  General Information Comments: Pt admitted 5/18/2023 with acute encephalopathy. Pt NPO - diet was previously advanced - now NPO again with TF restarted at 10 mL/hr. Current wt 42.9 kg - stable since  5/22/2023. NFPE completed 5/19/2023 - severe buccal, orbital, and temporal depletion. LBM 5/23/2023.  Nutrition Discharge Planning: TBD    Patient Active Problem List   Diagnosis    Cirrhosis, Laennec's    Thrombocytopenia    History of seizure    Glaucoma    hx of intentional Tylenol overdose    Alcohol abuse, in remission    AYESHA (acute kidney injury)    Macrocytic anemia    Chronic liver failure    Severe protein-calorie malnutrition    Hepatic encephalopathy    Bipolar 1 disorder, depressed, severe    Elevated LFTs    Bipolar 1 disorder    Bipolar disorder, manic    Urinary tract infection without hematuria    SVT (supraventricular tachycardia)    Alcoholic cirrhosis    Hepatic cirrhosis    Metabolic acidosis    Hyperammonemia    Hypernatremia    Hypercalcemia    Acute encephalopathy    Hyperglycemia    Low body temperature     Past Medical History:   Diagnosis Date    Addiction to drug     Alcohol abuse     Alcohol abuse, in remission 6/15/2015    14.5 weeks ago; AA weekly    Anemia     Anxiety 6/15/2015    Behavioral problem     Bipolar disorder     Bipolar disorder in remission 6/15/2015    Cirrhosis, Laennec's 6/15/2015    Depression     Encounter for blood transfusion     Epistaxis 6/15/2015    Fatigue     Glaucoma     Hematuria     Hepatic encephalopathy 6/15/2015    Hepatic enlargement     History of psychiatric care     History of psychiatric hospitalization     History of seizure 6/15/2015    1    hx of intentional Tylenol overdose 6/15/2015    2005- situational and hx of bipolar    Hx of psychiatric care     Macrocytic anemia 9/18/2015    6 units PRBC since June 2015    Jeana     Osteoarthritis     Other ascites 6/15/2015    Psychiatric exam requested by authority     Psychiatric problem     Psychosis 9/26/2019    Renal disorder     Seizures     Self-harming behavior     Suicide attempt     Therapy     Thrombocytopenia 6/15/2015       Nutrition Risk Screen    Nutrition Risk Screen: tube feeding or  "parenteral nutrition    Nutrition/Diet History    Food Preferences: No cultural or Nondenominational preferences identified.  Spiritual, Cultural Beliefs, Druze Practices, Values that Affect Care: no  Food Allergies: NKFA  Factors Affecting Nutritional Intake: impaired cognitive status/motor control, chewing difficulties/inability to chew food    Anthropometrics    Temp: 97.7 °F (36.5 °C)  Height Method: Stated (per prior medical record)  Height: 5' 2" (157.5 cm)  Height (inches): 62 in  Weight Method: Bed Scale (per [facility])  Weight: 42.9 kg (94 lb 9.2 oz)  Weight (lb): 94.58 lb  Ideal Body Weight (IBW), Female: 110 lb  % Ideal Body Weight, Female (lb): 83.77 %  BMI (Calculated): 17.3  BMI Grade: 17 - 18.4 protein-energy malnutrition grade I  Weight Loss: unintentional  Usual Body Weight (UBW), k.5 kg  % Usual Body Weight: 71.6  % Weight Change From Usual Weight: -28.55 %     Wt Readings from Last 10 Encounters:   23 42.9 kg (94 lb 9.2 oz)   23 45.3 kg (99 lb 13.9 oz)   03/10/23 46.8 kg (103 lb 2.8 oz)   23 47.1 kg (103 lb 13.4 oz)   23 58.5 kg (129 lb)   23 58.9 kg (129 lb 13.6 oz)   21 58.9 kg (129 lb 13.6 oz)   21 59 kg (130 lb)   21 53.1 kg (117 lb)   20 54.3 kg (119 lb 11.4 oz)       Lab/Procedures/Meds    Pertinent Labs Reviewed: reviewed  Pertinent Labs Comments: Sodium 149, Phosphorus 1.8, Albumin 1.4    Lab Results   Component Value Date/Time     (H) 2023 05:55 AM    K 3.0 (L) 2023 05:55 AM    EGFRNORACEVR >60 2023 05:55 AM    CALCIUM 8.2 (L) 2023 05:55 AM    PHOS 1.8 (L) 2023 05:55 AM    MG 1.7 2023 05:55 AM    ALBUMIN 1.4 (L) 2023 05:55 AM    TRIG 39 2020 08:01 AM    HGBA1C 4.1 2021 07:29 AM     BMP  Lab Results   Component Value Date     (H) 2023    K 3.0 (L) 2023     (HH) 2023    CO2 19 (L) 2023    BUN 13 2023    CREATININE 0.6 2023    " CALCIUM 8.2 (L) 05/25/2023    ANIONGAP 2 (L) 05/25/2023    EGFRNORACEVR >60 05/25/2023     Lab Results   Component Value Date    HGBA1C 4.1 01/22/2021     POCT Glucose   Date Value Ref Range Status   05/25/2023 132 (H) 70 - 110 mg/dL Final   05/25/2023 156 (H) 70 - 110 mg/dL Final   05/24/2023 155 (H) 70 - 110 mg/dL Final   05/24/2023 221 (H) 70 - 110 mg/dL Final   05/24/2023 141 (H) 70 - 110 mg/dL Final   05/24/2023 93 70 - 110 mg/dL Final   05/23/2023 126 (H) 70 - 110 mg/dL Final   05/23/2023 170 (H) 70 - 110 mg/dL Final   05/23/2023 154 (H) 70 - 110 mg/dL Final   05/23/2023 153 (H) 70 - 110 mg/dL Final   05/22/2023 151 (H) 70 - 110 mg/dL Final   05/22/2023 125 (H) 70 - 110 mg/dL Final   05/22/2023 154 (H) 70 - 110 mg/dL Final       Recent Labs   Lab 05/25/23  0555   WBC 8.63   RBC 2.64*   HGB 8.9*   HCT 28.4*   PLT 82*   *   MCH 33.7*   MCHC 31.3*       Pertinent Medications Reviewed: reviewed  Pertinent Medications Comments: Folic Acid, Lactulose, Magnesium Sulfate, Potassium Bicarbonate, Potassium Phosphate, Rifaximin, Thiamine, Insulin Aspart U-100 pen 0-5 units    Scheduled Meds:   ARIPiprazole  5 mg Oral Daily    enoxparin  40 mg Subcutaneous Q24H (prophylaxis, 1700)    folic acid  1 mg Per NG tube Daily    lactulose  30 g Per NG tube BID    levetiracetam  1,000 mg Per NG tube BID    potassium bicarbonate  25 mEq Oral Q4H    potassium phosphate IVPB  30 mmol Intravenous Once    propranoloL  20 mg Per NG tube Daily    rifAXIMin  550 mg Per NG tube BID    thiamine (VITAMIN B1) IVPB  250 mg Intravenous Daily    zonisamide  100 mg Per NG tube Daily     Continuous Infusions:   potassium chloride maintenance fluid 100 mL/hr at 05/25/23 1218     PRN Meds:.dextrose 10%, dextrose 10%, dextrose, dextrose, glucagon (human recombinant), insulin aspart U-100, melatonin, sodium chloride 0.9%    Estimated/Assessed Needs    Weight Used For Calorie Calculations: 42.9 kg (94 lb 9.2 oz)  Energy Calorie Requirements  (kcal): 1930 (45 kcal/kg)  Energy Need Method: Kcal/kg  Protein Requirements: 73 g (1.7 g/kg)  Weight Used For Protein Calculations: 42.9 kg (94 lb 9.2 oz)  Fluid Requirements (mL): 1930  Estimated Fluid Requirement Method: RDA Method  RDA Method (mL): 1930       Nutrition Prescription Ordered    Current Diet Order: NPO  Oral Nutrition Supplement: None    Evaluation of Received Nutrient/Fluid Intake    Enteral Calories (kcal): 360  Enteral Protein (gm): 16  Enteral (Free Water) Fluid (mL): 183  Free Water Flush Fluid (mL): 1000  % Kcal Needs: 18.6  % Protein Needs: 21.9  Total Fluid Intake (mL): 1183  I/O: 0/200    Intake/Output Summary (Last 24 hours) at 5/25/2023 1225  Last data filed at 5/25/2023 0539  Gross per 24 hour   Intake --   Output 200 ml   Net -200 ml       Energy Calories Required: not meeting needs  Protein Required: not meeting needs  Fluid Required: not meeting needs  % Intake of Estimated Energy Needs: 0 - 25 %  % Meal Intake: NPO    Nutrition Risk    Level of Risk/Frequency of Follow-up:  (2x/week)     Monitor and Evaluation    Food and Nutrient Intake: enteral nutrition intake  Food and Nutrient Adminstration: enteral and parenteral nutrition administration  Anthropometric Measurements: weight, weight change, body mass index  Biochemical Data, Medical Tests and Procedures: electrolyte and renal panel, gastrointestinal profile, glucose/endocrine profile  Nutrition-Focused Physical Findings: overall appearance     Nutrition Follow-Up    RD Follow-up?: Yes

## 2023-05-25 NOTE — PT/OT/SLP PROGRESS
Occupational Therapy   Treatment    Name: Marely Hamilton  MRN: [id number]  Admitting Diagnosis:  Acute encephalopathy       Recommendations:     Discharge Recommendations: other (see comments) (low intensity therapy pending progression medically; consideration of palliative consult recommended)  Discharge Equipment Recommendations:  other (see comments) (TBD)  Barriers to discharge:  Decreased caregiver support, Increased level of assistance     Assessment:     Marely Hamilton is a 58 y.o. female with a medical diagnosis of Acute encephalopathy.   Performance deficits affecting function are weakness, impaired endurance, impaired self care skills, impaired functional mobility, gait instability, impaired balance, impaired cognition, decreased coordination, decreased upper extremity function, decreased lower extremity function, decreased safety awareness, pain, abnormal tone, decreased ROM, impaired coordination, impaired fine motor, impaired skin, edema, impaired cardiopulmonary response to activity.   Pt found in bed, asleep, arouses briefly with verbal and tactile stimulation.  Pt was dependent with 2 assist to transition to EOB. She was unable to maintain a wakeful state to participate in therapy therefore return to supine.  RN notified. Continue OT services to address functional goals, progressing as able.      Rehab Prognosis:  Fair; patient would benefit from acute skilled OT services to address these deficits and reach maximum level of function.       Plan:     Patient to be seen 3 x/week to address the above listed problems via self-care/home management, therapeutic activities, therapeutic exercises  Plan of Care Expires: 06/23/23  Plan of Care Reviewed with: patient    Subjective     Chief Complaint: unable to report  Patient/Family Comments/goals: unable to report  Pain/Comfort:  Pain Rating 1:  (unable to report)    Objective:     Communicated with: RN prior to session.  Patient found HOB elevated with bed  "alarm, NG tube, peripheral IV, telemetry, oxygen, pressure relief boots, SCD upon OT entry to room.    General Precautions: Standard, fall, aspiration, NPO    Orthopedic Precautions:N/A  Braces: N/A  Respiratory Status: Nasal cannula     Occupational Performance:     Bed Mobility:    Patient completed Rolling/Turning to Left with  dependent and 2 persons  Patient completed Rolling/Turning to Right with dependent and 2 persons  Patient completed Scooting/Bridging with dependent and 2 persons  Patient completed Supine to Sit with dependent and 2 persons  Patient completed Sit to Supine with dependent and 2 persons     Functional Mobility/Transfers:  Unable     Activities of Daily Living:  Dependent with all ADL's      [facility] 6 Click ADL: 6    Treatment & Education:  Pt briefly opens eyes with verbal and tactile cues. Fixed gaze.   Pt attempting to communicate-mostly incoherent however she did say "leave me alone."  Pt following simple 1 step commands less than 10% of time.   Pt sat EOB with Dependent assist ~2 min with posterior push.  She was unable to maintain a wakeful state to participate in therapy therefore return to supine.   Pt positioned on R side with wedge, for pressure relief, and to encourage head in midline as pt tends to hold head rotated to L side.   BUEs elevated on pillows. Increased edema noted in R hand.       Patient left right sidelying with all lines intact, call button in reach, bed alarm on, and RN notified    GOALS:   Multidisciplinary Problems       Occupational Therapy Goals          Problem: Occupational Therapy    Goal Priority Disciplines Outcome Interventions   Occupational Therapy Goal     OT, PT/OT Ongoing, Progressing    Description: Goals to be met by: 6/23/2023     Patient will increase functional independence with ADLs by performing:    *new goal 5/23/2023: following one step simple command with 25% accuracy  Feeding with Set-up Assistance pending NG status and overarching ST " ingestion recs.   UE Dressing with Minimal Assistance.  LE Dressing with Moderate Assistance.  Grooming while seated with Set-up Assistance.  Toileting from bedside commode with Stand-by Assistance for hygiene and clothing management.   Bathing seated UB sponge with Stand-by Assistance.  Toilet transfer to bedside commode with Minimal Assistance.  Increased functional strength to WFL as needed for recovery of effective use of UB as a stabilizer in routine ADL transfers.   Upper extremity exercise program x10 reps per handout, with assistance as needed.                         Time Tracking:     OT Date of Treatment: 05/25/23  OT Start Time: 1033  OT Stop Time: 1058  OT Total Time (min): 25 min    Billable Minutes:Therapeutic Activity 25 5/25/2023

## 2023-05-26 PROBLEM — E83.39 HYPOPHOSPHATEMIA: Status: ACTIVE | Noted: 2023-05-26

## 2023-05-26 PROBLEM — E87.8 REFEEDING SYNDROME: Status: ACTIVE | Noted: 2023-05-26

## 2023-05-26 LAB
25(OH)D3+25(OH)D2 SERPL-MCNC: 15 NG/ML (ref 30–96)
ALBUMIN SERPL BCP-MCNC: 1.6 G/DL (ref 3.5–5.2)
ALP SERPL-CCNC: 189 U/L (ref 55–135)
ALT SERPL W/O P-5'-P-CCNC: 85 U/L (ref 10–44)
ANION GAP SERPL CALC-SCNC: 3 MMOL/L (ref 8–16)
ANION GAP SERPL CALC-SCNC: 6 MMOL/L (ref 8–16)
AST SERPL-CCNC: 141 U/L (ref 10–40)
BASOPHILS # BLD AUTO: 0.01 K/UL (ref 0–0.2)
BASOPHILS NFR BLD: 0.1 % (ref 0–1.9)
BILIRUB SERPL-MCNC: 3.1 MG/DL (ref 0.1–1)
BUN SERPL-MCNC: 11 MG/DL (ref 6–20)
BUN SERPL-MCNC: 13 MG/DL (ref 6–20)
CA-I BLDV-SCNC: 1.31 MMOL/L (ref 1.06–1.42)
CALCIUM SERPL-MCNC: 8.5 MG/DL (ref 8.7–10.5)
CALCIUM SERPL-MCNC: 8.6 MG/DL (ref 8.7–10.5)
CHLORIDE SERPL-SCNC: 122 MMOL/L (ref 95–110)
CHLORIDE SERPL-SCNC: 123 MMOL/L (ref 95–110)
CO2 SERPL-SCNC: 18 MMOL/L (ref 23–29)
CO2 SERPL-SCNC: 23 MMOL/L (ref 23–29)
CREAT SERPL-MCNC: 0.6 MG/DL (ref 0.5–1.4)
CREAT SERPL-MCNC: 0.6 MG/DL (ref 0.5–1.4)
DIFFERENTIAL METHOD: ABNORMAL
EOSINOPHIL # BLD AUTO: 0.4 K/UL (ref 0–0.5)
EOSINOPHIL NFR BLD: 3.9 % (ref 0–8)
ERYTHROCYTE [DISTWIDTH] IN BLOOD BY AUTOMATED COUNT: 20.5 % (ref 11.5–14.5)
EST. GFR  (NO RACE VARIABLE): >60 ML/MIN/1.73 M^2
EST. GFR  (NO RACE VARIABLE): >60 ML/MIN/1.73 M^2
GLUCOSE SERPL-MCNC: 156 MG/DL (ref 70–110)
GLUCOSE SERPL-MCNC: 164 MG/DL (ref 70–110)
HCT VFR BLD AUTO: 32.5 % (ref 37–48.5)
HGB BLD-MCNC: 10.1 G/DL (ref 12–16)
IMM GRANULOCYTES # BLD AUTO: 0.03 K/UL (ref 0–0.04)
IMM GRANULOCYTES NFR BLD AUTO: 0.3 % (ref 0–0.5)
LYMPHOCYTES # BLD AUTO: 2 K/UL (ref 1–4.8)
LYMPHOCYTES NFR BLD: 20.2 % (ref 18–48)
MAGNESIUM SERPL-MCNC: 2.2 MG/DL (ref 1.6–2.6)
MCH RBC QN AUTO: 33.9 PG (ref 27–31)
MCHC RBC AUTO-ENTMCNC: 31.1 G/DL (ref 32–36)
MCV RBC AUTO: 109 FL (ref 82–98)
MONOCYTES # BLD AUTO: 1 K/UL (ref 0.3–1)
MONOCYTES NFR BLD: 10.2 % (ref 4–15)
NEUTROPHILS # BLD AUTO: 6.4 K/UL (ref 1.8–7.7)
NEUTROPHILS NFR BLD: 65.3 % (ref 38–73)
NRBC BLD-RTO: 0 /100 WBC
PHOSPHATE SERPL-MCNC: 2 MG/DL (ref 2.7–4.5)
PHOSPHATE SERPL-MCNC: 3 MG/DL (ref 2.7–4.5)
PLATELET # BLD AUTO: 102 K/UL (ref 150–450)
PMV BLD AUTO: 10.1 FL (ref 9.2–12.9)
POCT GLUCOSE: 169 MG/DL (ref 70–110)
POCT GLUCOSE: 173 MG/DL (ref 70–110)
POCT GLUCOSE: 204 MG/DL (ref 70–110)
POTASSIUM SERPL-SCNC: 4.2 MMOL/L (ref 3.5–5.1)
POTASSIUM SERPL-SCNC: 5 MMOL/L (ref 3.5–5.1)
PROT SERPL-MCNC: 4.4 G/DL (ref 6–8.4)
RBC # BLD AUTO: 2.98 M/UL (ref 4–5.4)
SODIUM SERPL-SCNC: 147 MMOL/L (ref 136–145)
SODIUM SERPL-SCNC: 148 MMOL/L (ref 136–145)
WBC # BLD AUTO: 9.72 K/UL (ref 3.9–12.7)

## 2023-05-26 PROCEDURE — 25000003 PHARM REV CODE 250: Performed by: INTERNAL MEDICINE

## 2023-05-26 PROCEDURE — 85025 COMPLETE CBC W/AUTO DIFF WBC: CPT | Performed by: STUDENT IN AN ORGANIZED HEALTH CARE EDUCATION/TRAINING PROGRAM

## 2023-05-26 PROCEDURE — 82330 ASSAY OF CALCIUM: CPT | Performed by: STUDENT IN AN ORGANIZED HEALTH CARE EDUCATION/TRAINING PROGRAM

## 2023-05-26 PROCEDURE — 94761 N-INVAS EAR/PLS OXIMETRY MLT: CPT

## 2023-05-26 PROCEDURE — 93005 ELECTROCARDIOGRAM TRACING: CPT

## 2023-05-26 PROCEDURE — 93010 ELECTROCARDIOGRAM REPORT: CPT | Mod: ,,, | Performed by: INTERNAL MEDICINE

## 2023-05-26 PROCEDURE — 83735 ASSAY OF MAGNESIUM: CPT | Performed by: STUDENT IN AN ORGANIZED HEALTH CARE EDUCATION/TRAINING PROGRAM

## 2023-05-26 PROCEDURE — 63600175 PHARM REV CODE 636 W HCPCS: Performed by: STUDENT IN AN ORGANIZED HEALTH CARE EDUCATION/TRAINING PROGRAM

## 2023-05-26 PROCEDURE — 80053 COMPREHEN METABOLIC PANEL: CPT | Performed by: STUDENT IN AN ORGANIZED HEALTH CARE EDUCATION/TRAINING PROGRAM

## 2023-05-26 PROCEDURE — 93010 EKG 12-LEAD: ICD-10-PCS | Mod: ,,, | Performed by: INTERNAL MEDICINE

## 2023-05-26 PROCEDURE — 82306 VITAMIN D 25 HYDROXY: CPT | Performed by: STUDENT IN AN ORGANIZED HEALTH CARE EDUCATION/TRAINING PROGRAM

## 2023-05-26 PROCEDURE — 95822 EEG COMA OR SLEEP ONLY: CPT | Mod: 26,,, | Performed by: STUDENT IN AN ORGANIZED HEALTH CARE EDUCATION/TRAINING PROGRAM

## 2023-05-26 PROCEDURE — 36415 COLL VENOUS BLD VENIPUNCTURE: CPT | Performed by: STUDENT IN AN ORGANIZED HEALTH CARE EDUCATION/TRAINING PROGRAM

## 2023-05-26 PROCEDURE — 25000003 PHARM REV CODE 250: Performed by: STUDENT IN AN ORGANIZED HEALTH CARE EDUCATION/TRAINING PROGRAM

## 2023-05-26 PROCEDURE — 27000221 HC OXYGEN, UP TO 24 HOURS

## 2023-05-26 PROCEDURE — 99900035 HC TECH TIME PER 15 MIN (STAT)

## 2023-05-26 PROCEDURE — 36406 VNPNXR<3YRS PHY/QHP OTHER VN: CPT

## 2023-05-26 PROCEDURE — 95822 EEG COMA OR SLEEP ONLY: CPT

## 2023-05-26 PROCEDURE — 95822 PR EEG,COMA/SLEEP RECORD ONLY: ICD-10-PCS | Mod: 26,,, | Performed by: STUDENT IN AN ORGANIZED HEALTH CARE EDUCATION/TRAINING PROGRAM

## 2023-05-26 PROCEDURE — 80048 BASIC METABOLIC PNL TOTAL CA: CPT | Mod: XB | Performed by: STUDENT IN AN ORGANIZED HEALTH CARE EDUCATION/TRAINING PROGRAM

## 2023-05-26 PROCEDURE — 21400001 HC TELEMETRY ROOM

## 2023-05-26 PROCEDURE — 84100 ASSAY OF PHOSPHORUS: CPT | Mod: 91 | Performed by: STUDENT IN AN ORGANIZED HEALTH CARE EDUCATION/TRAINING PROGRAM

## 2023-05-26 PROCEDURE — 84100 ASSAY OF PHOSPHORUS: CPT | Performed by: STUDENT IN AN ORGANIZED HEALTH CARE EDUCATION/TRAINING PROGRAM

## 2023-05-26 RX ORDER — CHOLECALCIFEROL (VITAMIN D3) 10 MCG
800 TABLET ORAL DAILY
Status: DISCONTINUED | OUTPATIENT
Start: 2023-05-27 | End: 2023-05-28 | Stop reason: HOSPADM

## 2023-05-26 RX ADMIN — POTASSIUM PHOSPHATE, MONOBASIC AND POTASSIUM PHOSPHATE, DIBASIC 30 MMOL: 224; 236 INJECTION, SOLUTION, CONCENTRATE INTRAVENOUS at 12:05

## 2023-05-26 RX ADMIN — LACTULOSE 30 G: 20 SOLUTION ORAL at 10:05

## 2023-05-26 RX ADMIN — RIFAXIMIN 550 MG: 550 TABLET ORAL at 10:05

## 2023-05-26 RX ADMIN — THIAMINE HYDROCHLORIDE 250 MG: 100 INJECTION, SOLUTION INTRAMUSCULAR; INTRAVENOUS at 10:05

## 2023-05-26 RX ADMIN — LEVETIRACETAM 1000 MG: 100 SOLUTION ORAL at 10:05

## 2023-05-26 NOTE — PROGRESS NOTES
[facility] Progress Note    Marely Hamilton  1965  [id number]      HPI:  Marely Hamilton is a 58 y.o. w/ Hx of hepatic cirrhosis, epilepsy, bipolar disorder who presented with altered mental status, thought likely due to hepatic encephalopathy and a UTI that was discovered upon admission. With treatment of her elevated ammonia and infection, her mental status seemed to improve before declining again today. Previously she was disoriented but able to converse, but today she does not speak and does not track with her eyes. Neurology was consulted for her decline in mental status.   At baseline, she is independent in her ADLs and has no significant neurologic deficits.      Subjective:   Today on examination, patient continues to be minimally responsive verbally. Did not open eyes during exam. Spoke 1-2 words on pain testing, but otherwise not speaking. Did not follow command. Asterixis noted to BUE on exam.    Objective:  Vitals:    05/26/23 0832   BP:    Pulse: 66   Resp:    Temp:      Scheduled Meds:   ARIPiprazole  5 mg Oral Daily    enoxparin  40 mg Subcutaneous Q24H (prophylaxis, 1700)    folic acid  1 mg Per NG tube Daily    lactulose  30 g Per NG tube BID    levetiracetam  1,000 mg Per NG tube BID    potassium phosphate IVPB  30 mmol Intravenous Once    propranoloL  20 mg Per NG tube Daily    rifAXIMin  550 mg Per NG tube BID    thiamine (VITAMIN B1) IVPB  250 mg Intravenous Daily    zonisamide  100 mg Per NG tube Daily       Neurologic Physical Examination:  Orientation  [id number]  Eyes not open to voice or pain, resists passive opening of eyes  Relatively non-verbal, but did speak 1-2 words on pain testing  Withdraws in BUE to pain, does not follow commands  Cranial Nerves  PERRL, oculocephalic reflexes intact, corneal reflexes intact, symmetric facial expression  Motor  Normal Bulk  Normal Tone  Withdraws to pain in all extremities  Sensory  Acknowledges painful stimuli  DTR   +2  symmetric  Cerebellar/Gait  Unable to asses    Laboratory Findings:   Recent Results (from the past 24 hour(s))   POCT glucose    Collection Time: 05/25/23 11:49 AM   Result Value Ref Range    POCT Glucose 132 (H) 70 - 110 mg/dL   POCT glucose    Collection Time: 05/25/23  4:55 PM   Result Value Ref Range    POCT Glucose 229 (H) 70 - 110 mg/dL   Basic metabolic panel    Collection Time: 05/25/23  6:04 PM   Result Value Ref Range    Sodium 143 136 - 145 mmol/L    Potassium 4.8 3.5 - 5.1 mmol/L    Chloride 124 (H) 95 - 110 mmol/L    CO2 18 (L) 23 - 29 mmol/L    Glucose 217 (H) 70 - 110 mg/dL    BUN 12 6 - 20 mg/dL    Creatinine 0.6 0.5 - 1.4 mg/dL    Calcium 7.9 (L) 8.7 - 10.5 mg/dL    Anion Gap 1 (L) 8 - 16 mmol/L    eGFR >60 >60 mL/min/1.73 m^2   Phosphorus    Collection Time: 05/25/23  6:04 PM   Result Value Ref Range    Phosphorus 3.0 2.7 - 4.5 mg/dL   POCT glucose    Collection Time: 05/25/23  8:17 PM   Result Value Ref Range    POCT Glucose 201 (H) 70 - 110 mg/dL   Comprehensive metabolic panel    Collection Time: 05/26/23  4:37 AM   Result Value Ref Range    Sodium 148 (H) 136 - 145 mmol/L    Potassium 4.2 3.5 - 5.1 mmol/L    Chloride 122 (H) 95 - 110 mmol/L    CO2 23 23 - 29 mmol/L    Glucose 164 (H) 70 - 110 mg/dL    BUN 11 6 - 20 mg/dL    Creatinine 0.6 0.5 - 1.4 mg/dL    Calcium 8.5 (L) 8.7 - 10.5 mg/dL    Total Protein 4.4 (L) 6.0 - 8.4 g/dL    Albumin 1.6 (L) 3.5 - 5.2 g/dL    Total Bilirubin 3.1 (H) 0.1 - 1.0 mg/dL    Alkaline Phosphatase 189 (H) 55 - 135 U/L     (H) 10 - 40 U/L    ALT 85 (H) 10 - 44 U/L    Anion Gap 3 (L) 8 - 16 mmol/L    eGFR >60 >60 mL/min/1.73 m^2   Phosphorus    Collection Time: 05/26/23  4:37 AM   Result Value Ref Range    Phosphorus 2.0 (L) 2.7 - 4.5 mg/dL   CBC auto differential    Collection Time: 05/26/23  4:37 AM   Result Value Ref Range    WBC 9.72 3.90 - 12.70 K/uL    RBC 2.98 (L) 4.00 - 5.40 M/uL    Hemoglobin 10.1 (L) 12.0 - 16.0 g/dL    Hematocrit 32.5 (L) 37.0 -  48.5 %     (H) 82 - 98 fL    MCH 33.9 (H) 27.0 - 31.0 pg    MCHC 31.1 (L) 32.0 - 36.0 g/dL    RDW 20.5 (H) 11.5 - 14.5 %    Platelets 102 (L) 150 - 450 K/uL    MPV 10.1 9.2 - 12.9 fL    Immature Granulocytes 0.3 0.0 - 0.5 %    Gran # (ANC) 6.4 1.8 - 7.7 K/uL    Immature Grans (Abs) 0.03 0.00 - 0.04 K/uL    Lymph # 2.0 1.0 - 4.8 K/uL    Mono # 1.0 0.3 - 1.0 K/uL    Eos # 0.4 0.0 - 0.5 K/uL    Baso # 0.01 0.00 - 0.20 K/uL    nRBC 0 0 /100 WBC    Gran % 65.3 38.0 - 73.0 %    Lymph % 20.2 18.0 - 48.0 %    Mono % 10.2 4.0 - 15.0 %    Eosinophil % 3.9 0.0 - 8.0 %    Basophil % 0.1 0.0 - 1.9 %    Differential Method Automated    Magnesium    Collection Time: 05/26/23  4:37 AM   Result Value Ref Range    Magnesium 2.2 1.6 - 2.6 mg/dL   POCT glucose    Collection Time: 05/26/23  6:40 AM   Result Value Ref Range    POCT Glucose 169 (H) 70 - 110 mg/dL       Neuroimaging:   CT Head Without Contrast    Result Date: 5/18/2023  EXAMINATION: CT HEAD WITHOUT CONTRAST CLINICAL HISTORY: Mental status change, unknown cause; Hepatic encephalopathy TECHNIQUE: Low dose axial images were obtained through the head.  Coronal and sagittal reformations were also performed. Contrast was not administered. COMPARISON: CT head 05/05/2023. FINDINGS: Blood: No acute intracranial hemorrhage. Parenchyma: No definite loss of gray-white differentiation to suggest acute or subacute transcortical infarct. Parenchymal volume loss.  Nonspecific areas of white matter hypoattenuation may relate to sequela of chronic small vessel ischemic disease. Ventricles/Extra-axial spaces: No abnormal extra-axial fluid collection. Basal cisterns are patent. Vessels: Moderate atherosclerotic calcification. Orbits: Status post bilateral lens replacements Scalp: Unremarkable. Skull: There are no depressed skull fractures or destructive bone lesions. Sinuses and mastoids: Scattered relatively modest paranasal sinus mucosal thickening with small retention cysts. Other  findings: Partially imaged sequela of cosmetic surgery, with suggested silicone implants in the bilateral pre malar soft tissues.     No acute intracranial findings. No significant change relative to prior head CT 05/05/2023. Electronically signed by: Mumtaz Penny Date:    05/18/2023 Time:    14:08    Assessment/Plan:   Marely Hamilton is a 58 y.o.  female with extensive past medical and psychiatric history who presented on 5/18 BIB family with concern for AMS x 1 day with most likely etiology being combination of hepatic encephalopathy and UTI. Admission complicated by hypercalcemia likely 2/2 super therapeutic lithium level. NH4, Calcium and Lithium levels normalizing, but remains encephalopathic. MRI Brain and EEG pending at this time.      Acute encephalopathy vs Focal seizure (concern for potential SE)  - Continue treatment of hepatic encephalopathy with lactulose and rifaximin   - NH4 and Calcium down trending   - Recommend repeating NH4  - EEG and MRI brain pending   - EEG more important at this time for seizure r/o  - Recommend 30mg/kg Keppra load (ideally after EEG if this can be done soon) and increase Keppra dose to 1000mg BID thereafter  - Continue Zonisamide as currently prescribed  - Further recommendations following EEG    Will continue to follow and monitor progression of current neurologic condition.      Eriberto Cronin MD  [facility] PGY-IV  [facility] Consult Service

## 2023-05-26 NOTE — NURSING
RAPID RESPONSE NURSE PROACTIVE ROUNDING NOTE       Time of Visit: 1220    Admit Date: 2023  LOS: 8  Code Status: Full Code   Date of Visit: 2023  : 1965  Age: 58 y.o.  Sex: female  Race: White  Bed: K485/K485 A:   MRN: [id number]  Was the patient discharged from an ICU this admission? No   Was the patient discharged from a PACU within last 24 hours? No   Did the patient receive conscious sedation/general anesthesia in last 24 hours? No   Was the patient in the ED within the past 24 hours? No   Was the patient on NIPPV within the past 24 hours? No   Attending Physician: Sarita Faith MD  Primary Service: [facility] H[facility]PITALIST TEAM A   Time spent at the bedside: < 15 min    SITUATION    Notified by charge RN via phone call  Reason for alert: Possible aspiration, vomiting    Diagnosis: Acute encephalopathy   has a past medical history of Addiction to drug, Alcohol abuse, Alcohol abuse, in remission, Anemia, Anxiety, Behavioral problem, Bipolar disorder, Bipolar disorder in remission, Cirrhosis, Laennec's, Depression, Encounter for blood transfusion, Epistaxis, Fatigue, Glaucoma, Hematuria, Hepatic encephalopathy, Hepatic enlargement, History of psychiatric care, History of psychiatric hospitalization, History of seizure, hx of intentional Tylenol overdose, psychiatric care, Macrocytic anemia, Jeana, Osteoarthritis, Other ascites, Psychiatric exam requested by authority, Psychiatric problem, Psychosis, Renal disorder, Seizures, Self-harming behavior, Suicide attempt, Therapy, and Thrombocytopenia.    Last Vitals:  Temp: 98.4 °F (36.9 °C) ( 1210)  Pulse: 67 ( 1210)  Resp: 20 ( 1210)  BP: 99/70 ( 1210)  SpO2: 92 % ( 1210)    24 Hour Vitals Range:  Temp:  [96 °F (35.6 °C)-98.4 °F (36.9 °C)]   Pulse:  [56-75]   Resp:  [14-20]   BP: ()/(55-70)   SpO2:  [87 %-97 %]     Clinical Issues: Respiratory    ASSESSMENT/INTERVENTIONS    AMS unchanged from admit, pt increased to 2.5 L NC by  bedside nurse. O2 sats 91%. BP stable. NGT advanced per MD order. MD at bedside discussing plan of care. Plan for MRI and EEG.    RECOMMENDATIONS  Abd x-ray to check NGT placement. Notify MD and RRN if O2 requirements increase.    Discussed plan of care with charge RNKaren    PROVIDER ESCALATION    Physician escalation: Yes    Orders received and case discussed with Dr. Faith team .    Disposition:Remain in room 485    FOLLOW UP    Call back the Rapid Response NurseRonit at [phone] for additional questions or concerns.

## 2023-05-26 NOTE — PROCEDURES
ELECTROENCEPHALOGRAM REPORT      METHODOLOGY   Electroencephalographic (EEG) recording is with electrodes placed according to the International 10-20 placement system.  Thirty two (32) channels of digital signal (sampling rate of 512/sec) including T1 and T2 was simultaneously recorded from the scalp and may include  EKG, EMG, and/or eye monitors.  Recording band pass was 0.1 to 512 hz.  Digital video recording of the patient is simultaneously recorded with the EEG.  The patient is instructed report clinical symptoms which may occur during the recording session.  EEG and video recording is stored and archived in digital format. Activation procedures which include photic stimulation, hyperventilation and instructing patients to perform simple task are done in selected patients.    The EEG is displayed on a monitor screen and can be reviewed using different montages.  Computer assisted analysis is employed to detect spike and electrographic seizure activity.   The entire record is submitted for computer analysis.  The entire recording is visually reviewed and the times identified by computer analysis as being spikes or seizures are reviewed again.  Compresses spectral analysis (CSA) is also performed on the activity recorded from each individual channel.  This is displayed as a power display of frequencies from 0 to 30 Hz over time.   The CSA is reviewed looking for asymmetries in power between homologous areas of the scalp and then compared with the original EEG recording.     [employer] software was also utilized in the review of this study.  This software suite analyzes the EEG recording in multiple domains.  Coherence and rhythmicity is computed to identify EEG sections which may contain organized seizures.  Each channel undergoes analysis to detect presence of spike and sharp waves which have special and morphological characteristic of epileptic activity.  The routine EEG recording is converted from spacial into  frequency domain.  This is then displayed comparing homologous areas to identify areas of significant asymmetry.  Algorithm to identify non-cortically generated artifact is used to separate eye movement, EMG and other artifact from the EEG    Indication: 58 year old female with encephalopathy, concern for seizures given history of epilepsy.      State of Consciousness:   Stupor    Background:   The background is poorly organized, but symmetric and continuous.   It mainly consists of generalized delta with some intermittent over-riding theta activity.  There is no clear posterior dominant rhythm appreciated    Sleep:   No clear sleep architecture is appreciated during the record    Non-epileptiform Abnormalities:  - Continuous slow, generalized  - Triphasic waves: intermittent discharges with the typical morphology and distribution of triphasic waves. These discharges are sometimes more prominent in the left hemisphere, but still with a triphasic morphology, and are favored to be reflective of encephalopathy rather than epileptiform in nature.           Epileptiform Abnormalities:   No clear epileptiform discharges    EKG:   Regular rate and rhythm on single lead EKG    Activating procedures:   -Not performed    Events:   None      Impression: Abnormal routine EEG showing generalized background slowing and triphasic waves.  There are no epileptiform discharges or lateralizing signs recorded.     Clinical interpretation: This routine EEG is consistent with a severe diffuse encephalopathy, likely of metabolic origin based on the presence of triphasic waves.  No clear epileptiform discharges or seizures are noted.    Suri Bruner MD  [facility]   Department of Neurology

## 2023-05-26 NOTE — PHYSICIAN QUERY
"PT Name: Marely Hamilton  MR #: [id number]    DOCUMENTATION CLARIFICATION     CDS/: Sumaya Winters RN           Contact information:   [email or web address]    This form is a permanent document in the medical record.     Query Date: May 26, 2023    By submitting this query, we are merely seeking further clarification of documentation.. Please utilize your independent clinical judgment when addressing the question(s) below.    The medical record contains the following:   Indicators  Supporting Clinical Findings Location in Medical Record    Energy Intake     x Weight Loss Wt loss of 28.55% x 3 months   RD Note    x Fat Loss Orbital Region (Subcutaneous Fat Loss): severe depletion    RD Note    x Muscle Loss [place] (Muscle Loss): severe depletion    RD Note     Edema/Fluid Accumulation      Reduced  Strength (by dynamometer)     x Weight, BMI, Usual Body Weight Height: 5' 2" (157.5 cm)  Height (inches): 62 in  Weight Method: Bed Scale (per [facility])  Weight: 42.9 kg (94 lb 9.2 oz)  Weight (lb): 94.58 lb  Ideal Body Weight (IBW), Female: 110 lb  % Ideal Body Weight, Female (lb): 83.77 %  BMI (Calculated): 17.3  BMI Grade: 17 - 18.4 protein-energy malnutrition grade I  Weight Loss: unintentional  Usual Body Weight (UBW), k.5 kg  % Usual Body Weight: 71.6  % Weight Change From Usual Weight: -28.55 %   RD Note     Delayed Wound Healing     x Registered Dietician Diagnosis Severe protein-calorie malnutrition  Malnutrition Type:  Context: Acute  Level: Severe    Current wt 42.9 kg - stable since 2023.     NFPE completed 2023 - severe buccal, orbital, and temporal    RD Note    x Acute or Chronic Illness Related to (etiology):   Hepatic encephalopathy  Altered mental status    Diagnosis: liver disease, infection/sepsis  Relevant Medical History: Addiction to Drug, ETOH Abuse, Anemia, Anxiety, Behavioral Disorder, Bipolar Disorder, Cirrhosis, Depression, Hepatic " Encephalopathy, Suicide Attempt, Psychosis, Self-Harming Behavior, Jeana      RD Note 5/25    Social or Environmental Circumstances      Treatment     x Other Pt NPO - diet was previously advanced - now NPO again with TF restarted at 10 mL/hr.   RD Note 5/25     Academy of Nutrition and Dietetics (Academy) and the American Society for Parenteral and Enteral Nutrition (A.S.P.E.N.) Clinical Characteristics to support Malnutrition   Malnutrition in the Context of Acute Illness or Injury Malnutrition in the Context of Chronic Illness or Injury Malnutrition in the Context of Social or Environmental Circumstances   Malnutrition Level Moderate Severe Moderate Severe   Moderate   Severe   Energy Intake <75%                   >7 days <50%                 >5 days <75%           >1 month <75%                      >1 month   <75% for >3 months   <50% for >1 month   Weight Loss   1-2% in 1 week >2% in 1 week 5% in 1 month >5% in 1 month 5% in 1 month >5% in 1 month    5% in 1 month >5% in 1 month 7.5% in 3 months >7.5% in 3 months 7.5% in 3 months >7.5% in 3 months    7.5% in 3 months >7.5% in 3 months 10% in 6 months >10% in 6 months 10% in 6 months >10% in 6 months        20% in 1 year                    >20% in 1 year                                                                  20% in 1 year                            >20% in 1 year                                                  Subcutaneous Fat Loss Mild  Moderate  Mild  Severe    Mild   Severe   Muscle Loss Mild  Moderate  Mild  Severe    Mild   Severe   Edema/Fluid Accumulation Mild Moderate to severe  Mild  Severe   Mild   Severe   Reduced  Strength         (based on standards supplied by  of dynamometer) N/A Measurably reduced N/A Measurably reduced N/A Measurably reduced     Criteria for mild malnutrition is defined as 1 characteristic outlined above within the established moderate or severe parameters.  A minimum of 2 out of the 6 characteristics  noted above are recommended for a diagnosis of moderate or severe malnutrition.  Chronic illness/injury is a disease/condition lasting 3 months or longer.    The noted clinical guidelines are only system guidelines and do not replace the providers clinical judgment.    Provider, please specify diagnosis or diagnoses associated with above clinical findings.    [ X ] Severe Malnutrition - a minimum of 2 of the 6 severe malnutrition characteristics noted above    [  ] Other Nutritional Diagnosis (please specify): _______   [  ] Malnutrition ruled out   [  ] Clinically Undetermined       Please document in your progress notes daily for the duration of treatment until resolved and  include in your discharge summary.      References:    LARA Valencia, & JENNIFER Silva (2022, April). Assessment and management of anorexia and cachexia in palliative care. Retrieved May 23, 2022, from https://www.[employer]/contents/assessment-and-management-of-anorexia-and-cachexia-in-palliative-care?lasfvQhu=6114&source=see_link     CARRINGTON Iglesias, PhD, RD, Lorraine CROSS P., PhD, RN, ARTHUR Orozco MD, PhD, [place]arrison PEREIRA A., MS, RD, [facility], LEXII Escalante, MS, RD, The Academy Malnutrition Work Group, The A.S.P.E.N. Board of Directors. (2012). Consensus Statement: Academy of Nutrition and Dietetics and American Society for Parenteral and Enteral Nutrition: Characteristics Recommended for the Identification and Documentation of Adult Malnutrition (Undernutrition). Journal of Parenteral and Enteral Nutrition, 36(3), 275-283. doi:10.1177/7606221968961293     Form No. 27546

## 2023-05-26 NOTE — NURSING
Shift assessment done. Pt resting in bed. No signs of distress. Call light within reach. Safety maintained with bed alarm on, wheels locked. NG tube feeding @ 30ml/hr.

## 2023-05-26 NOTE — PLAN OF CARE
ELSI sent Psych referral via [facility] for several facilities. ELSI will follow.     TAMMY Luna  [phone]     05/26/23 1519   Post-Acute Status   Post-Acute Authorization Placement   Post-Acute Placement Status Referrals Sent   Coverage [facility] Resources/Appts/Education Provided Appointments scheduled and added to AVS   Discharge Plan   Discharge Plan A Skilled Nursing Facility;[facility] hospital

## 2023-05-26 NOTE — PT/OT/SLP PROGRESS
Speech Language Pathology  HOLD        Marely Hamilton  MRN: [id number]      1100AM  Pt not appropriate for oral diet or PO trials due to poor wakeful state and limited command following.   NEURO following pt, awaiting EEG for further work-up and modification of goals.   SLP did speak in person to Dr. LY Faith.         5/26/2023

## 2023-05-26 NOTE — PLAN OF CARE
Problem: Adult Inpatient Plan of Care  Goal: Optimal Comfort and Wellbeing  Outcome: Ongoing, Progressing     Problem: Fall Injury Risk  Goal: Absence of Fall and Fall-Related Injury  Outcome: Ongoing, Progressing     Problem: Fluid and Electrolyte Imbalance (Acute Kidney Injury/Impairment)  Goal: Fluid and Electrolyte Balance  Outcome: Ongoing, Progressing

## 2023-05-26 NOTE — PROGRESS NOTES
[facility] Medicine Progress Note    Primary Team: [facility] Hospitalist Team A  Attending Physician: Sarita Faith MD  Resident: Jorge  Intern: Kendall    Subjective:      Pt remains encephalopathic. Updating sister daily by telephone. Slightly more responsive this morning.       Objective:     Last 24 Hour Vital Signs:  BP  Min: 97/53  Max: 131/62  Temp  Av.3 °F (36.3 °C)  Min: 96 °F (35.6 °C)  Max: 98 °F (36.7 °C)  Pulse  Av.6  Min: 56  Max: 72  Resp  Av.5  Min: 14  Max: 18  SpO2  Av.7 %  Min: 92 %  Max: 97 %  I/O last 3 completed shifts:  In: 750 [NG/GT:750]  Out: 200 [Urine:200]    Physical Examination:  General: thin adult female sleeping, wakes to voice  HEENT: NC/AT, NGT in place   Neck: Supple, trachea midline  CV: 2/6 systolic murmur throughout precordium, normal S1/S2  Resp: CTAB, no wheezing or rhonchi appreciated, normal respiratory effort  Abd: Soft, NT/ND, normoactive BSx4  Extremities: 2+ pulses, bruising and edema in bilateral upper extremities   Skin: Warm, dry, intact, facial spider angiomas  Neuro: wakes to voice. Does not consisently follow commands. Speaking in short sentences, but mostly unintelligible  Psych: unable to assess    Laboratory:  Recent Labs   Lab 23  0802 23  0555 23  1804 23  0437   WBC 9.16 8.63  --  9.72   HGB 9.6* 8.9*  --  10.1*   HCT 31.1* 28.4*  --  32.5*   PLT 73* 82*  --  102*   * 108*  --  109*   RDW 19.1* 19.9*  --  20.5*   * 149* 143 148*   K 3.0* 3.0* 4.8 4.2   * 128* 124* 122*   CO2 23 19* 18* 23   BUN 16 13 12 11   CREATININE 0.6 0.6 0.6 0.6   GLU 96 105 217* 164*   PROT 4.3* 3.7*  --  4.4*   ALBUMIN 1.6* 1.4*  --  1.6*   BILITOT 2.5* 2.5*  --  3.1*   AST 87* 95*  --  141*   ALKPHOS 162* 148*  --  189*   ALT 55* 57*  --  85*     Urine culture growing Proteus mirabilis     Other Results:  EKG (my interpretation): NSR    Radiology Data: no new    Current Medications:     Infusions:         Scheduled:    ARIPiprazole  5 mg Oral Daily    enoxparin  40 mg Subcutaneous Q24H (prophylaxis, 1700)    folic acid  1 mg Per NG tube Daily    lactulose  30 g Per NG tube BID    levetiracetam  1,000 mg Per NG tube BID    potassium phosphate IVPB  30 mmol Intravenous Once    propranoloL  20 mg Per NG tube Daily    rifAXIMin  550 mg Per NG tube BID    thiamine (VITAMIN B1) IVPB  250 mg Intravenous Daily    zonisamide  100 mg Per NG tube Daily        PRN:  dextrose 10%, dextrose 10%, dextrose, dextrose, glucagon (human recombinant), insulin aspart U-100, melatonin, sodium chloride 0.9%    Assessment:     Marely Hamilton is a 58 y.o.female w/ PMHx of EtOH induced cirrhosis, seizure on AEDs, and bipolar disorder who presented to [facility] on 5/18/2023 with a primary complaint of altered mental status for 1 day due to hepatic encephalopathy and UTI. Admission complicated by hypercalcemia likely 2/2 super therapeutic lithium level. Hypercalcemia resolved, lithium level wnl, however patient still encephalopathic. MRI Brain and EEG pending.      Plan:     Moderate to Severe Hypercalcemia, improving   Ca 11.9 on CMP in ED.  - ionized Ca elevated at 1.6, PTH 84 consistent with primary hyperparathyroidism  - calcium increased to 14 when corrected for hypoalbuminemia   - likely multifactorial due to lithium and dehydration, may be contributing to her encephalopathy  - discontinue lithium, multivitamin , tube feeds  - s/p NS at 200 mL/hr, s/p IM calcitonin x3, IV zolendronate  - following CMP/ionized calcium   - PTHrP pending, TSH wnl  - NS discontinued at this time due to hypernatremia and hyperchloremia  - consider cinacalcet if pt is restarted on lithium as she is not a surgical candidate for parathyroidectomy     Acute Encephalopathy due to hepatic encephalopathy & hypercalcemia  EtOh-induced Cirrhosis  Pt found altered by [employer]ica (CNA) earlier on day of admission. She has a hx of hepatic encephalopathy 2/2 medication  non-compliance. Pt found to have jaundice and scleral icterus on physical exam. Ammonia 139, Tbili 4.8 in ED.  - Likely etiology of hepatic encephalopathy 2/2 medication non-compliance (especially given past hx and PE findings) vs UTI vs hypernatremia  - CTH with no acute abnormality   - Continue lactulose, rifaximine, thiamine, and multivitamin  - Keep NPO pending Speech eval and improvement in mentation; NGT feeds held due to calcium content   - NGT for lactulose administration  - MRI, EEG ordered  - Neurology consulted    Hypophosphatemia  - phos 2  - replaced with K Phos 30 mmol   - follow phos levels    Hypokalemia  - K 4.2  - replacing as needed    Hypomagnesemia   - Mag 2.2  - replace with mag sulfate 2g as needed    Hypernatremia  Hyperchloremia  - NS discontinued for treatment of hypercalcemia  - D5W infusion stopped, continue to monitor      Bipolar Disorder  H/o Self Harm  Supertheraputic lithium level  Pt has bipolar disorder w/ h/o suicide attempt including intentional overdose w/ acetaminophen, self harm, and medication non-compliance.  Plan:  - Lithium held due to hypercalcemia and elevated level  - level at admit 0.3; repeat 5/23 1.4 after lithium given BID  - trend lithium level to ensure downtrend   - Seroquel, and Zyprexa; held seroquel and zyprexa due to somnolence   - Continue to closely monitor pt's behavior as encephalopathy improves  - consult psych this week for guidance on resumption of meds  - started on Abilify per psychiatry     Proteus UTI  UA in ED w/ 3+ occult blood, 2.0-3.0 urobilinogen, 3+ leukocytes, 34 RBCs, >100 WBC.  - Repeat UA w/ cx d/t dirty catch on initial UA  - previous urine culture with >100K CFU Proteus mirabilis   - cultures growing pan-sensitive Proteus Mirabilis   - cefepime narrowed to ceftriaxone, completes 7 day course 5/24     Leukocytosis, improved  - WBC 13 on admit, increased to 17.55  - patient afebrile, s/p tx for UTI  - CXR without consolidation concerning  for pneumonia   - WBC now within normal limits, leukocytosis likely due to lithium     Elevated Troponin  Troponin mildly elevated to 0.037 in ED.  Plan:  - Likely d/t demand in setting of hypovolemia  - EKG did not demonstrate new abnormalities  - Troponin peaked, down trend to 0.035  - Continue to routinely monitor vitals     AYESHA, improving   Cr 1.1 on CMP in ED, baseline~ 0.6.  Plan:  - Likely pre-renal in etiology given hypovolemia  - following CMPs  - Renally dose meds      Thrombocytopenia  Pt has known hx of thrombocytopenia. Plt 147 on CBC in ED.  Plan:  - due to cirrhosis  - no acute conern for bleeding, continue to monitor      Seizure Disorder  Pt has hx of seizures and is currently on Keppra 1000 mg BID and Zonisamide 100 mg daily at home. No reported seizure for several years. Per chart review, seizures appear to have taken place in setting of EtOH withdrawal.  Plan:  - Continue home Keppra and Zonisamide  - Continue to monitor  - Recommend Neuro f/u at discharge    Healthcare Maintenance  Pt not UTD on vaccinations or preventative screenings.  Plan:  - F/u w/ PCP at discharge to discuss preventative care    Dispo: pending improvement in encephalopathy, electrolyte derangements    Bridget Argueta MD  [facility] Medicine-Pediatrics [facility]  05/26/2023 7:15 AM    [facility] Medicine Hospitalist Pager numbers:   [facility] Hospitalist Medicine Team A (Angela/Vianney): [phone]  [facility] Hospitalist Medicine Team B (Lyn/Edenilson):  [phone]

## 2023-05-27 PROBLEM — R73.9 HYPERGLYCEMIA: Status: RESOLVED | Noted: 2023-05-24 | Resolved: 2023-05-27

## 2023-05-27 PROBLEM — E87.0 HYPERNATREMIA: Status: RESOLVED | Noted: 2023-05-19 | Resolved: 2023-05-27

## 2023-05-27 LAB
ALBUMIN SERPL BCP-MCNC: 1.5 G/DL (ref 3.5–5.2)
ALP SERPL-CCNC: 186 U/L (ref 55–135)
ALT SERPL W/O P-5'-P-CCNC: 74 U/L (ref 10–44)
AMMONIA PLAS-SCNC: 37 UMOL/L (ref 10–50)
ANION GAP SERPL CALC-SCNC: 6 MMOL/L (ref 8–16)
ANISOCYTOSIS BLD QL SMEAR: SLIGHT
APTT PPP: 43.3 SEC (ref 21–32)
AST SERPL-CCNC: 117 U/L (ref 10–40)
BASOPHILS # BLD AUTO: 0.05 K/UL (ref 0–0.2)
BASOPHILS NFR BLD: 0.4 % (ref 0–1.9)
BILIRUB DIRECT SERPL-MCNC: 1.1 MG/DL (ref 0.1–0.3)
BILIRUB SERPL-MCNC: 3.6 MG/DL (ref 0.1–1)
BILIRUB UR QL STRIP: NEGATIVE
BUN SERPL-MCNC: 15 MG/DL (ref 6–20)
BURR CELLS BLD QL SMEAR: ABNORMAL
CALCIUM SERPL-MCNC: 8.3 MG/DL (ref 8.7–10.5)
CHLORIDE SERPL-SCNC: 125 MMOL/L (ref 95–110)
CLARITY UR: CLEAR
CO2 SERPL-SCNC: 15 MMOL/L (ref 23–29)
COLOR UR: YELLOW
CREAT SERPL-MCNC: 0.6 MG/DL (ref 0.5–1.4)
DIFFERENTIAL METHOD: ABNORMAL
EOSINOPHIL # BLD AUTO: 0 K/UL (ref 0–0.5)
EOSINOPHIL NFR BLD: 0.3 % (ref 0–8)
ERYTHROCYTE [DISTWIDTH] IN BLOOD BY AUTOMATED COUNT: 21.2 % (ref 11.5–14.5)
EST. GFR  (NO RACE VARIABLE): >60 ML/MIN/1.73 M^2
GLUCOSE SERPL-MCNC: 130 MG/DL (ref 70–110)
GLUCOSE UR QL STRIP: NEGATIVE
HCT VFR BLD AUTO: 30.5 % (ref 37–48.5)
HGB BLD-MCNC: 9.2 G/DL (ref 12–16)
HGB UR QL STRIP: NEGATIVE
IMM GRANULOCYTES # BLD AUTO: 0.18 K/UL (ref 0–0.04)
IMM GRANULOCYTES NFR BLD AUTO: 1.5 % (ref 0–0.5)
INR PPP: 2 (ref 0.8–1.2)
KETONES UR QL STRIP: NEGATIVE
LEUKOCYTE ESTERASE UR QL STRIP: NEGATIVE
LYMPHOCYTES # BLD AUTO: 1.2 K/UL (ref 1–4.8)
LYMPHOCYTES NFR BLD: 10.4 % (ref 18–48)
MAGNESIUM SERPL-MCNC: 2.1 MG/DL (ref 1.6–2.6)
MCH RBC QN AUTO: 34.7 PG (ref 27–31)
MCHC RBC AUTO-ENTMCNC: 30.2 G/DL (ref 32–36)
MCV RBC AUTO: 115 FL (ref 82–98)
MONOCYTES # BLD AUTO: 1 K/UL (ref 0.3–1)
MONOCYTES NFR BLD: 8.6 % (ref 4–15)
NEUTROPHILS # BLD AUTO: 9.2 K/UL (ref 1.8–7.7)
NEUTROPHILS NFR BLD: 78.8 % (ref 38–73)
NITRITE UR QL STRIP: NEGATIVE
NRBC BLD-RTO: 0 /100 WBC
OVALOCYTES BLD QL SMEAR: ABNORMAL
PH UR STRIP: 6 [PH] (ref 5–8)
PHOSPHATE SERPL-MCNC: 2.6 MG/DL (ref 2.7–4.5)
PLATELET # BLD AUTO: 69 K/UL (ref 150–450)
PLATELET BLD QL SMEAR: ABNORMAL
PMV BLD AUTO: 10.4 FL (ref 9.2–12.9)
POCT GLUCOSE: 143 MG/DL (ref 70–110)
POCT GLUCOSE: 146 MG/DL (ref 70–110)
POCT GLUCOSE: 157 MG/DL (ref 70–110)
POCT GLUCOSE: 164 MG/DL (ref 70–110)
POIKILOCYTOSIS BLD QL SMEAR: ABNORMAL
POLYCHROMASIA BLD QL SMEAR: ABNORMAL
POTASSIUM SERPL-SCNC: 4.9 MMOL/L (ref 3.5–5.1)
PROT SERPL-MCNC: 4.4 G/DL (ref 6–8.4)
PROT UR QL STRIP: ABNORMAL
PROTHROMBIN TIME: 21.1 SEC (ref 9–12.5)
RBC # BLD AUTO: 2.65 M/UL (ref 4–5.4)
SCHISTOCYTES BLD QL SMEAR: PRESENT
SODIUM SERPL-SCNC: 146 MMOL/L (ref 136–145)
SP GR UR STRIP: 1.02 (ref 1–1.03)
URN SPEC COLLECT METH UR: ABNORMAL
UROBILINOGEN UR STRIP-ACNC: NEGATIVE EU/DL
WBC # BLD AUTO: 11.72 K/UL (ref 3.9–12.7)

## 2023-05-27 PROCEDURE — 85730 THROMBOPLASTIN TIME PARTIAL: CPT | Performed by: STUDENT IN AN ORGANIZED HEALTH CARE EDUCATION/TRAINING PROGRAM

## 2023-05-27 PROCEDURE — 85025 COMPLETE CBC W/AUTO DIFF WBC: CPT | Performed by: INTERNAL MEDICINE

## 2023-05-27 PROCEDURE — 36415 COLL VENOUS BLD VENIPUNCTURE: CPT | Performed by: STUDENT IN AN ORGANIZED HEALTH CARE EDUCATION/TRAINING PROGRAM

## 2023-05-27 PROCEDURE — 25000003 PHARM REV CODE 250: Performed by: STUDENT IN AN ORGANIZED HEALTH CARE EDUCATION/TRAINING PROGRAM

## 2023-05-27 PROCEDURE — 25000003 PHARM REV CODE 250: Performed by: INTERNAL MEDICINE

## 2023-05-27 PROCEDURE — 94761 N-INVAS EAR/PLS OXIMETRY MLT: CPT

## 2023-05-27 PROCEDURE — 51701 INSERT BLADDER CATHETER: CPT

## 2023-05-27 PROCEDURE — 81003 URINALYSIS AUTO W/O SCOPE: CPT | Performed by: STUDENT IN AN ORGANIZED HEALTH CARE EDUCATION/TRAINING PROGRAM

## 2023-05-27 PROCEDURE — 36415 COLL VENOUS BLD VENIPUNCTURE: CPT | Performed by: INTERNAL MEDICINE

## 2023-05-27 PROCEDURE — 82140 ASSAY OF AMMONIA: CPT | Performed by: INTERNAL MEDICINE

## 2023-05-27 PROCEDURE — 87040 BLOOD CULTURE FOR BACTERIA: CPT | Performed by: STUDENT IN AN ORGANIZED HEALTH CARE EDUCATION/TRAINING PROGRAM

## 2023-05-27 PROCEDURE — 82340 ASSAY OF CALCIUM IN URINE: CPT | Performed by: STUDENT IN AN ORGANIZED HEALTH CARE EDUCATION/TRAINING PROGRAM

## 2023-05-27 PROCEDURE — 85610 PROTHROMBIN TIME: CPT | Performed by: STUDENT IN AN ORGANIZED HEALTH CARE EDUCATION/TRAINING PROGRAM

## 2023-05-27 PROCEDURE — 80053 COMPREHEN METABOLIC PANEL: CPT | Performed by: STUDENT IN AN ORGANIZED HEALTH CARE EDUCATION/TRAINING PROGRAM

## 2023-05-27 PROCEDURE — 99900035 HC TECH TIME PER 15 MIN (STAT)

## 2023-05-27 PROCEDURE — 21400001 HC TELEMETRY ROOM

## 2023-05-27 PROCEDURE — 82248 BILIRUBIN DIRECT: CPT | Performed by: STUDENT IN AN ORGANIZED HEALTH CARE EDUCATION/TRAINING PROGRAM

## 2023-05-27 PROCEDURE — 83735 ASSAY OF MAGNESIUM: CPT | Performed by: STUDENT IN AN ORGANIZED HEALTH CARE EDUCATION/TRAINING PROGRAM

## 2023-05-27 PROCEDURE — 27000221 HC OXYGEN, UP TO 24 HOURS

## 2023-05-27 PROCEDURE — 63600175 PHARM REV CODE 636 W HCPCS: Performed by: STUDENT IN AN ORGANIZED HEALTH CARE EDUCATION/TRAINING PROGRAM

## 2023-05-27 PROCEDURE — 84100 ASSAY OF PHOSPHORUS: CPT | Performed by: STUDENT IN AN ORGANIZED HEALTH CARE EDUCATION/TRAINING PROGRAM

## 2023-05-27 RX ADMIN — PROPRANOLOL HYDROCHLORIDE 20 MG: 10 TABLET ORAL at 10:05

## 2023-05-27 RX ADMIN — ARIPIPRAZOLE 5 MG: 5 TABLET ORAL at 10:05

## 2023-05-27 RX ADMIN — RIFAXIMIN 550 MG: 550 TABLET ORAL at 09:05

## 2023-05-27 RX ADMIN — ENOXAPARIN SODIUM 40 MG: 40 INJECTION SUBCUTANEOUS at 04:05

## 2023-05-27 RX ADMIN — LACTULOSE 30 G: 20 SOLUTION ORAL at 10:05

## 2023-05-27 RX ADMIN — LEVETIRACETAM 1000 MG: 100 SOLUTION ORAL at 09:05

## 2023-05-27 RX ADMIN — LEVETIRACETAM 1000 MG: 100 SOLUTION ORAL at 10:05

## 2023-05-27 RX ADMIN — FOLIC ACID 1 MG: 1 TABLET ORAL at 10:05

## 2023-05-27 RX ADMIN — LACTULOSE 30 G: 20 SOLUTION ORAL at 09:05

## 2023-05-27 RX ADMIN — ZONISAMIDE 100 MG: 100 CAPSULE ORAL at 10:05

## 2023-05-27 RX ADMIN — CHOLECALCIFEROL (VITAMIN D3) 10 MCG (400 UNIT) TABLET 800 UNITS: at 10:05

## 2023-05-27 RX ADMIN — RIFAXIMIN 550 MG: 550 TABLET ORAL at 10:05

## 2023-05-27 RX ADMIN — THIAMINE HYDROCHLORIDE 250 MG: 100 INJECTION, SOLUTION INTRAMUSCULAR; INTRAVENOUS at 10:05

## 2023-05-27 NOTE — CONSULTS
"[facility]    CC: Altered mental status     HPI 58 y.o. female with history of decompensated alcohol associated cirrhosis with hepatic encephalopathy, alcohol abuse, Bipolar depression, and seizure disorder who initially presented with acute-onset, progressively worsening encephalopathy. She had been compliant with her medications at her nursing facility prior to admission. She currently denies any abdominal pain or nausea. She is alert but only oriented to self. She does not have any prior history of abdominal ascites or GI bleeding.     Chart reviewed and summarized here.    Past Medical History  Decompensated alcohol associated cirrhosis with hepatic encephalopathy  Alcohol abuse  Bipolar depression  Seizure disorder     Past Surgical History  Past Surgical History:   Procedure Laterality Date    COSMETIC SURGERY      ESOPHAGOGASTRODUODENOSCOPY         Social History  Denies tobacco use. History of alcohol abuse. Denies illicit drug use.     Family History  Family History   Problem Relation Age of Onset    Alcohol abuse Father     Suicide Father     Bipolar disorder Father     Alcohol abuse Sister     Hypertension Mother     Alcohol abuse Paternal Grandfather     Drug abuse Neg Hx     Dementia Neg Hx      Review of Systems  General ROS: negative for chills, fever  Respiratory ROS: no cough, shortness of breath, or wheezing  Cardiovascular ROS: no chest pain or palpitations  Gastrointestinal ROS: no abdominal pain, change in bowel habits, or black/ bloody stools    Physical Examination  /60 (BP Location: Left arm, Patient Position: Lying)   Pulse 71   Temp 96.6 °F (35.9 °C) (Axillary)   Resp 18   Ht 5' 2" (1.575 m)   Wt 42.9 kg (94 lb 9.2 oz)   SpO2 95%   BMI 17.30 kg/m²   General appearance: Chronically ill-appearing woman, alert, cooperative, no distress  HENT: Normocephalic, atraumatic, neck symmetrical, no nasal discharge; NG tube in place   Eyes: Mild scleral icterus, PERRL, EOM's " intact  Lungs: Inspiratory crackles in the bilateral lung bases, no dullness to percussion bilaterally  Heart: regular rate and rhythm without rub; no displacement of the PMI  Abdomen: soft, non-tender; bowel sounds normoactive; no organomegaly  Extremities: extremities symmetric; no clubbing, cyanosis; Bilateral upper extremity edema present  Integument:  Ecchymoses in the bilateral hands; spider telangiectasias in the anterior chest wall; hair distrubution normal  Neurologic: Alert and oriented X 1 (self), able to answer some questions appropriately, decreased strength in bilateral upper extremities (L>R); Mild resting tremor in bilateral hands; No asterixis on exam   Psychiatric: no pressured speech; Flat affect    Labs:  WBC 11.72  Hgb 9.2  PLT 69  Na 146  Cl 125  HCO3 16  BUN 15/Cr 0.6  Corrected calcium 10.3  /ALT 74    T bili 3.6  Total protein 4.4  Albumin 1.5  INR 2.0   MELD-Na 19  Urinalysis negative   Blood cultures NGTD    Imaging:  MRI Brain wo contrast (5/26/2023): No acute intracranial abnormality.     Chest Xray (5/26/2023): Bilateral interstitial opacities consistent with pulmonary edema vs. Atelectasis.       Assessment:   58 y.o. female with history of decompensated alcohol associated cirrhosis with hepatic encephalopathy, alcohol abuse, Bipolar depression, and seizure disorder who initially presented with acute-onset, progressively worsening encephalopathy. She was initially found to have a urinary tract infection, possible lithium toxicity and hypercalcemia. Her UTI has resolved, and her calcium levels are now within normal limits, but her encephalopathy has persisted despite adequate dosing of lactulose. MRI Brain did not reveal any structural defects or acute CVA. EEG revealed triphasic waves consistent with metabolic encephalopathy. Her encephalopathy appears to be multifactorial but has gradually improved with appropriate lactulose dosing.     Plan:   - Trend CMP, PT/INR daily    - Do not recommend trending ammonia levels as this will not change clinical management   - Follow-up right upper quadrant ultrasound with doppler   - Recommend starting daily thiamine supplementation as well as folate and mutlivitamin   - Continue with lactulose via NG tube until patient is able to tolerate oral intake; adjust dosing with goal of 3-4 bowel movements daily (Please document stools in chart)  - Continue Rifaximin 550 mg BID   - Continue with tube feeds via NG tube   - Delirium precautions (ie. Awake during the day, avoid sedating medications)   - Continue working with PT/OT  - Follow-up Neurology recommendations and continue anti-epileptic medications     Case discussed with Dr. Puri.     Catracho Del Toro MD  [facility], PGY-V

## 2023-05-27 NOTE — NURSING
"RAPID RESPONSE NURSE PROACTIVE ROUNDING NOTE     Time of Visit:     Admit Date: 2023  LOS: 8  Code Status: Full Code   Date of Visit: 2023  : 1965  Age: 58 y.o.  Sex: female  Race: White  Bed: K485/K485 A:   MRN: [id number]  Was the patient discharged from an ICU this admission? No   Was the patient discharged from a PACU within last 24 hours?  No  Did the patient receive conscious sedation/general anesthesia in last 24 hours?  No  Was the patient in the ED within the past 24 hours?  No  Was the patient started on NIPPV within the past 24 hours?  No  Attending Physician: Sarita Faith MD  Primary Service: [facility]    ASSESSMENT     Notified by charge RN via phone call.  Reason for alert: EKG pauses, telemetry alarming asystole     Diagnosis: Acute encephalopathy    Abnormal Vital Signs: /75 (BP Location: Left arm, Patient Position: Lying)   Pulse 79   Temp 97.5 °F (36.4 °C)   Resp 18   Ht 5' 2" (1.575 m)   Wt 42.9 kg (94 lb 9.2 oz)   SpO2 (!) 92%   BMI 17.30 kg/m²      Clinical Issues: Neuro    Patient  has a past medical history of Addiction to drug, Alcohol abuse, Alcohol abuse, in remission, Anemia, Anxiety, Behavioral problem, Bipolar disorder, Bipolar disorder in remission, Cirrhosis, Laennec's, Depression, Encounter for blood transfusion, Epistaxis, Fatigue, Glaucoma, Hematuria, Hepatic encephalopathy, Hepatic enlargement, History of psychiatric care, History of psychiatric hospitalization, History of seizure, hx of intentional Tylenol overdose, psychiatric care, Macrocytic anemia, Jeana, Osteoarthritis, Other ascites, Psychiatric exam requested by authority, Psychiatric problem, Psychosis, Renal disorder, Seizures, Self-harming behavior, Suicide attempt, Therapy, and Thrombocytopenia.    Bedside eval requested by tele charge RN. Telemetry showing HR 30s, and then asystole. Visualized pt, pt arousable. Disoriented x4 but this is baseline per bedside. EKG " performed, appears NSR. HR 79. Pt shaking causing some artifact. Telemetry leads changed with no improvement to telemetry readings, tele box changed with return of correlating HR/rhythm. Additonally, pt with aspiration event earlier in the day per review of previous N note and MD. NGT in place with bilious output. Supplemental O2 currently at 3 L. Team will continue to follow.      INTERVENTIONS/ RECOMMENDATIONS     EKG obtained. MD aware.   Telemetry leads and box changed with correlating values.   Contact for any concerns.     Discussed plan of care with RNLynsey.    PHYSICIAN ESCALATION     Yes/No  Yes    Orders received and case discussed with Dr. Calvillo .    Disposition: Remain in room 485.    FOLLOW-UP     Call back the Rapid Response Nurse, GIULIANA EM RN at [facility] Phone: [phone] for additional questions or concerns.

## 2023-05-27 NOTE — PLAN OF CARE
[facility]  Plan of Care Note    Labs today significant for worsening thrombocytopenia (69k from 102k), increase in MCV to 115, increasing bilirubin of 3.6, and persistently elevated INR. Neuro evaluation negative for epileptiform activity, consistent with severe diffuse encephalopathy. Calcium, lithium, and ammonia levels have normalized, however patient remains severely encephalopathic with no notable improvement after 10 days of lactulose and rifaximin.    Given continued severity of symptoms, significant concern that patient's acute on chronic liver failure has progressed beyond recovery and that patient requires higher level of care than available at [facility]. Contacted transfer center for possible transfer to [facility] to have patient evaluated by hepatology. [facility] also consulted to offer recommendations while patient remains at [facility], though no hepatology service is available at this facility.    Mayra Novoa MD  [facility] Internal Medicine, PGY-3  [facility] Internal Medicine Team A

## 2023-05-27 NOTE — NURSING
NURSE PROACTIVE ROUNDING NOTE       Time of Chart Review: 830    Admit Date: 2023  LOS: 9  Code Status: Full Code   Date of Visit: 2023  : 1965  Age: 58 y.o.  Sex: female  Race: White  Bed: K485/K485 A  MRN: [id number]  Was the patient discharged from an ICU this admission? No   Was the patient discharged from a PACU within last 24 hours? No   Did the patient receive conscious sedation/general anesthesia in last 24 hours? No   Was the patient in the ED within the past 24 hours? No   Was the patient on NIPPV within the past 24 hours? No   Attending Physician: Sarita Faith MD  Primary Service: [facility]   Time spent at the bedside: < 15 min    SITUATION    Notified by previous RRN during handoff    Diagnosis: Acute encephalopathy   has a past medical history of Addiction to drug, Alcohol abuse, Alcohol abuse, in remission, Anemia, Anxiety, Behavioral problem, Bipolar disorder, Bipolar disorder in remission, Cirrhosis, Laennec's, Depression, Encounter for blood transfusion, Epistaxis, Fatigue, Glaucoma, Hematuria, Hepatic encephalopathy, Hepatic enlargement, History of psychiatric care, History of psychiatric hospitalization, History of seizure, hx of intentional Tylenol overdose, psychiatric care, Macrocytic anemia, Jeana, Osteoarthritis, Other ascites, Psychiatric exam requested by authority, Psychiatric problem, Psychosis, Renal disorder, Seizures, Self-harming behavior, Suicide attempt, Therapy, and Thrombocytopenia.    Last Vitals:  Temp: 96.1 °F (35.6 °C) ( 075)  Pulse: 80 ( 075)  Resp: 18 (758)  BP: 106/59 ( 075)  SpO2: 93 % (758)    24 Hour Vitals Range:  Temp:  [96.1 °F (35.6 °C)-98.4 °F (36.9 °C)]   Pulse:  [67-91]   Resp:  [17-20]   BP: ()/(59-75)   SpO2:  [87 %-94 %]     Clinical Issues: Neuro    ASSESSMENT/INTERVENTIONS  - Chart reviewed including lab/EEG results, vital signs, and progress notes    RECOMMENDATIONS  - Continue to  monitor neuro and respiratory status    PROVIDER ESCALATION    Physician escalation: No    Disposition:Remain in room 485    FOLLOW UP    Call back the Rapid Response NurseCristela at [phone] for additional questions or concerns.

## 2023-05-27 NOTE — PROGRESS NOTES
[facility] Progress Note    Marely Hamilton  1965  [id number]      HPI:  Marely Hamilton is a 58 y.o. w/ Hx of hepatic cirrhosis, epilepsy, bipolar disorder who presented with altered mental status, thought likely due to hepatic encephalopathy and a UTI that was discovered upon admission. With treatment of her elevated ammonia and infection, her mental status seemed to improve before declining again today. Previously she was disoriented but able to converse, but today she does not speak and does not track with her eyes. Neurology was consulted for her decline in mental status.   At baseline, she is independent in her ADLs and has no significant neurologic deficits.      Subjective:   Patient continues to be minimally responsive verbally, responded to name by making sound. Did not open eyes during exam. Made some noises pain testing, but otherwise not speaking. Did not follow command. Asterixis noted to BUE on exam. Intermittent lip puckering noted.    Objective:  Vitals:    05/27/23 1153   BP: 112/60   Pulse:    Resp:    Temp:      Scheduled Meds:   ARIPiprazole  5 mg Oral Daily    cholecalciferol (vitamin D3)  800 Units Oral Daily    enoxparin  40 mg Subcutaneous Q24H (prophylaxis, 1700)    folic acid  1 mg Per NG tube Daily    lactulose  30 g Per NG tube BID    levetiracetam  1,000 mg Per NG tube BID    propranoloL  20 mg Per NG tube Daily    rifAXIMin  550 mg Per NG tube BID    zonisamide  100 mg Per NG tube Daily       Neurologic Physical Examination:  Orientation  [id number]  Eyes not open to voice or pain, resists passive opening of eyes  Relatively non-verbal, but did moan on pain testing  Withdraws in BUE to pain, does not follow commands  Cranial Nerves  PERRL, oculocephalic reflexes intact, corneal reflexes intact, symmetric facial expression  Motor  Normal Bulk  Normal Tone  Withdraws to pain in all extremities  Sensory  Acknowledges painful stimuli  DTR   +2 symmetric  Cerebellar/Gait  Unable to  gil    Laboratory Findings:   Recent Results (from the past 24 hour(s))   POCT glucose    Collection Time: 05/26/23  6:33 PM   Result Value Ref Range    POCT Glucose 173 (H) 70 - 110 mg/dL   Basic metabolic panel    Collection Time: 05/26/23  6:35 PM   Result Value Ref Range    Sodium 147 (H) 136 - 145 mmol/L    Potassium 5.0 3.5 - 5.1 mmol/L    Chloride 123 (H) 95 - 110 mmol/L    CO2 18 (L) 23 - 29 mmol/L    Glucose 156 (H) 70 - 110 mg/dL    BUN 13 6 - 20 mg/dL    Creatinine 0.6 0.5 - 1.4 mg/dL    Calcium 8.6 (L) 8.7 - 10.5 mg/dL    Anion Gap 6 (L) 8 - 16 mmol/L    eGFR >60 >60 mL/min/1.73 m^2   Phosphorus    Collection Time: 05/26/23  6:35 PM   Result Value Ref Range    Phosphorus 3.0 2.7 - 4.5 mg/dL   POCT glucose    Collection Time: 05/27/23  6:08 AM   Result Value Ref Range    POCT Glucose 157 (H) 70 - 110 mg/dL   Comprehensive metabolic panel    Collection Time: 05/27/23  9:38 AM   Result Value Ref Range    Sodium 146 (H) 136 - 145 mmol/L    Potassium 4.9 3.5 - 5.1 mmol/L    Chloride 125 (H) 95 - 110 mmol/L    CO2 15 (L) 23 - 29 mmol/L    Glucose 130 (H) 70 - 110 mg/dL    BUN 15 6 - 20 mg/dL    Creatinine 0.6 0.5 - 1.4 mg/dL    Calcium 8.3 (L) 8.7 - 10.5 mg/dL    Total Protein 4.4 (L) 6.0 - 8.4 g/dL    Albumin 1.5 (L) 3.5 - 5.2 g/dL    Total Bilirubin 3.6 (H) 0.1 - 1.0 mg/dL    Alkaline Phosphatase 186 (H) 55 - 135 U/L     (H) 10 - 40 U/L    ALT 74 (H) 10 - 44 U/L    Anion Gap 6 (L) 8 - 16 mmol/L    eGFR >60 >60 mL/min/1.73 m^2   Phosphorus    Collection Time: 05/27/23  9:38 AM   Result Value Ref Range    Phosphorus 2.6 (L) 2.7 - 4.5 mg/dL   Magnesium    Collection Time: 05/27/23  9:38 AM   Result Value Ref Range    Magnesium 2.1 1.6 - 2.6 mg/dL   POCT glucose    Collection Time: 05/27/23 11:22 AM   Result Value Ref Range    POCT Glucose 164 (H) 70 - 110 mg/dL   Bilirubin, direct    Collection Time: 05/27/23 11:51 AM   Result Value Ref Range    Bilirubin, Direct 1.1 (H) 0.1 - 0.3 mg/dL   Ammonia     Collection Time: 05/27/23 12:04 PM   Result Value Ref Range    Ammonia 37 10 - 50 umol/L   Protime-INR    Collection Time: 05/27/23 12:04 PM   Result Value Ref Range    Prothrombin Time 21.1 (H) 9.0 - 12.5 sec    INR 2.0 (H) 0.8 - 1.2   APTT    Collection Time: 05/27/23 12:04 PM   Result Value Ref Range    aPTT 43.3 (H) 21.0 - 32.0 sec   CBC auto differential    Collection Time: 05/27/23  1:12 PM   Result Value Ref Range    WBC 11.72 3.90 - 12.70 K/uL    RBC 2.65 (L) 4.00 - 5.40 M/uL    Hemoglobin 9.2 (L) 12.0 - 16.0 g/dL    Hematocrit 30.5 (L) 37.0 - 48.5 %     (H) 82 - 98 fL    MCH 34.7 (H) 27.0 - 31.0 pg    MCHC 30.2 (L) 32.0 - 36.0 g/dL    RDW 21.2 (H) 11.5 - 14.5 %    MPV 10.4 9.2 - 12.9 fL       Neuroimaging:     MRI Brain 5/26:  Confluent deep white matter periventricular high T2 and FLAIR signal compatible with microvascular chronic ischemic changes noted. No acute intracranial abnormalities.  Chronic small vessel ischemic changes.    CT Head Without Contrast  Result Date: 5/18/2023  No acute intracranial findings.        EEG 5/26  Non-epileptiform Abnormalities:  - Continuous slow, generalized  - Triphasic waves: intermittent discharges with the typical morphology and distribution of triphasic waves. These discharges are sometimes more prominent in the left hemisphere, but still with a triphasic morphology, and are favored to be reflective of encephalopathy rather than epileptiform in nature.            Clinical interpretation: This routine EEG is consistent with a severe diffuse encephalopathy, likely of metabolic origin based on the presence of triphasic waves.  No clear epileptiform discharges or seizures are noted.      Assessment/Plan:   Marely Hamilton is a 58 y.o.  female with extensive past medical and psychiatric history who presented on 5/18 BIB family with concern for AMS x 1 day with most likely etiology being combination of hepatic encephalopathy and UTI. Admission complicated by  hypercalcemia likely 2/2 super therapeutic lithium level. NH4, Calcium and Lithium levels normalizing, but remains encephalopathic. EEG with Triphasic waves and MRI with chronic small vessel ischemic changes. Currently downtrending ammonia levels. Physical exam continues to stay the same with [id number].  Non verbal , not opening eyes , moans to pain and responds to name by making sounds. Etiology secondary to toxic metabolic abnormalities evidenced by triphasic waves in EEG and lab abnormalities      Acute Metabolic encephalopathy   -  Mainstay is to continue treatment of hepatic encephalopathy with lactulose and rifaximin   - NH4 and Calcium down trending   - repeat NH4  : 37  - EE/26 : consistent with a severe diffuse encephalopathy, likely of metabolic origin based on the presence of triphasic waves.  No clear epileptiform discharges or seizures are noted.and MRI brain pending  - Recommend Repeat EEG in 48- 72 hours   - Continue Keppra dose 1000mg BID   - Continue Zonisamide as currently prescribed    Discussed with .     Will continue to follow and monitor progression of current neurologic condition.      Neo Goodrich MD  [facility] PGY-IV  [facility] Consult Service

## 2023-05-27 NOTE — PROGRESS NOTES
[facility] Medicine Progress Note    Primary Team: [facility] Hospitalist Team A  Attending Physician: Sarita Faith MD  Resident: Jorge  Intern: Kendall    Subjective:      Eyes opened upon entrance to the room. Intermittent responses but not appropriate.     Objective:     Last 24 Hour Vital Signs:  BP  Min: 99/70  Max: 117/61  Temp  Av.3 °F (36.3 °C)  Min: 96.1 °F (35.6 °C)  Max: 98.4 °F (36.9 °C)  Pulse  Av.9  Min: 67  Max: 91  Resp  Av.1  Min: 17  Max: 20  SpO2  Av.9 %  Min: 87 %  Max: 95 %  I/O last 3 completed shifts:  In: 750 [NG/GT:750]  Out: -     Physical Examination:  General: thin adult female awake lying in bed  HEENT: NC/AT, scleral icterus, NGT in place   Neck: Supple, trachea midline  CV: 2/6 systolic murmur throughout precordium, normal S1/S2  Resp: CTAB, no wheezing or rhonchi appreciated, normal respiratory effort  Abd: Soft, NT/ND, normoactive BSx4  Extremities: 2+ pulses, bruising and edema in bilateral upper extremities   Skin: Warm, dry, intact, facial spider angiomas  Neuro: Does not consistently follow commands. Responding intermittently but not appropriate  Psych: unable to assess    Laboratory:  Recent Labs   Lab 23  0802 23  0555 23  1804 23  0437 23  1835   WBC 9.16 8.63  --  9.72  --    HGB 9.6* 8.9*  --  10.1*  --    HCT 31.1* 28.4*  --  32.5*  --    PLT 73* 82*  --  102*  --    * 108*  --  109*  --    RDW 19.1* 19.9*  --  20.5*  --    * 149* 143 148* 147*   K 3.0* 3.0* 4.8 4.2 5.0   * 128* 124* 122* 123*   CO2 23 19* 18* 23 18*   BUN 16 13 12 11 13   CREATININE 0.6 0.6 0.6 0.6 0.6   GLU 96 105 217* 164* 156*   PROT 4.3* 3.7*  --  4.4*  --    ALBUMIN 1.6* 1.4*  --  1.6*  --    BILITOT 2.5* 2.5*  --  3.1*  --    AST 87* 95*  --  141*  --    ALKPHOS 162* 148*  --  189*  --    ALT 55* 57*  --  85*  --    Urine culture growing Proteus mirabilis     Other Results:  EKG (my interpretation): NSR    Radiology Data: no  new    Current Medications:     Infusions:         Scheduled:   ARIPiprazole  5 mg Oral Daily    cholecalciferol (vitamin D3)  800 Units Oral Daily    enoxparin  40 mg Subcutaneous Q24H (prophylaxis, 1700)    folic acid  1 mg Per NG tube Daily    lactulose  30 g Per NG tube BID    levetiracetam  1,000 mg Per NG tube BID    propranoloL  20 mg Per NG tube Daily    rifAXIMin  550 mg Per NG tube BID    thiamine (VITAMIN B1) IVPB  250 mg Intravenous Daily    zonisamide  100 mg Per NG tube Daily        PRN:  dextrose 10%, dextrose 10%, dextrose, dextrose, glucagon (human recombinant), insulin aspart U-100, melatonin, sodium chloride 0.9%    Assessment:     Marely Hamilton is a 58 y.o.female w/ PMHx of EtOH induced cirrhosis, seizure on AEDs, and bipolar disorder who presented to [facility] on 5/18/2023 with a primary complaint of altered mental status for 1 day due to hepatic encephalopathy and UTI. Admission complicated by hypercalcemia likely 2/2 super therapeutic lithium level. Hypercalcemia resolved, lithium level wnl, however patient still encephalopathic. MRI Brain and EEG pending.      Plan:     Moderate to Severe Hypercalcemia, improved   Ca 11.9 on CMP in ED.  - ionized Ca elevated at 1.6, PTH 84 consistent with primary hyperparathyroidism  - calcium increased to 14 when corrected for hypoalbuminemia   - likely multifactorial due to lithium and dehydration, may be contributing to her encephalopathy  - discontinue lithium, multivitamin , tube feeds  - s/p NS at 200 mL/hr, s/p IM calcitonin x3, IV zolendronate  - following CMP/ionized calcium   - PTHrP pending, TSH wnl  - NS discontinued at this time due to hypernatremia and hyperchloremia  - consider cinacalcet if pt is restarted on lithium as she is not a surgical candidate for parathyroidectomy     Acute Encephalopathy due to hepatic encephalopathy & hypercalcemia  EtOh-induced Cirrhosis  Pt found altered by [employer] (CNA) earlier on day of  admission. She has a hx of hepatic encephalopathy 2/2 medication non-compliance. Pt found to have jaundice and scleral icterus on physical exam. Ammonia 139, Tbili 4.8 in ED.  - Likely etiology of hepatic encephalopathy 2/2 medication non-compliance (especially given past hx and PE findings) vs UTI vs hypernatremia  - CTH with no acute abnormality   - Continue lactulose, rifaximine, thiamine, and multivitamin  - Keep NPO pending Speech eval and improvement in mentation; NGT feeds held due to calcium content   - NGT for lactulose administration  - MRI only with chronic ischemic changes  - EEG with no epileptiform changes and generalized background slowing and triphasic changes showing consistency with metabolic diffuse encephalopathy  - Neurology consulted    Hypophosphatemia  - phos 2  - replaced with K Phos 30 mmol   - follow phos levels    Hypokalemia  - K 4.2  - replacing as needed    Hypomagnesemia   - Mag 2.2  - replace with mag sulfate 2g as needed    Hypernatremia  Hyperchloremia  - NS discontinued for treatment of hypercalcemia  - D5W infusion stopped, continue to monitor      Bipolar Disorder  H/o Self Harm  Supertheraputic lithium level  Pt has bipolar disorder w/ h/o suicide attempt including intentional overdose w/ acetaminophen, self harm, and medication non-compliance.  Plan:  - Lithium held due to hypercalcemia and elevated level  - level at admit 0.3; repeat 5/23 1.4 after lithium given BID  - trend lithium level to ensure downtrend   - Seroquel, and Zyprexa; held seroquel and zyprexa due to somnolence   - Continue to closely monitor pt's behavior as encephalopathy improves  - consult psych this week for guidance on resumption of meds  - started on Abilify per psychiatry     Proteus UTI  UA in ED w/ 3+ occult blood, 2.0-3.0 urobilinogen, 3+ leukocytes, 34 RBCs, >100 WBC.  - Repeat UA w/ cx d/t dirty catch on initial UA  - previous urine culture with >100K CFU Proteus mirabilis   - cultures growing  pan-sensitive Proteus Mirabilis   - cefepime narrowed to ceftriaxone, completed 7 day course 5/24     Leukocytosis, improved  - WBC 13 on admit, increased to 17.55  - patient afebrile, s/p tx for UTI  - CXR without consolidation concerning for pneumonia   - WBC now within normal limits, leukocytosis likely due to lithium     Elevated Troponin  Troponin mildly elevated to 0.037 in ED.  Plan:  - Likely d/t demand in setting of hypovolemia  - EKG did not demonstrate new abnormalities  - Troponin peaked, down trend to 0.035  - Continue to routinely monitor vitals     AYESHA, improved   Cr 1.1 on CMP in ED, baseline~ 0.6.  Plan:  - Likely pre-renal in etiology given hypovolemia  - following CMPs  - Renally dose meds      Thrombocytopenia  Pt has known hx of thrombocytopenia. Plt 147 on CBC in ED.  Plan:  - due to cirrhosis  - no acute conern for bleeding, continue to monitor      Seizure Disorder  Pt has hx of seizures and is currently on Keppra 1000 mg BID and Zonisamide 100 mg daily at home. No reported seizure for several years. Per chart review, seizures appear to have taken place in setting of EtOH withdrawal.  Plan:  - Continue home Keppra and Zonisamide  - Continue to monitor  - Recommend Neuro f/u at discharge    Healthcare Maintenance  Pt not UTD on vaccinations or preventative screenings.  Plan:  - F/u w/ PCP at discharge to discuss preventative care    Dispo: pending improvement in encephalopathy, electrolyte derangements    Lydia Lawrence MD  [facility] Medicine HO-I  05/27/2023 7:15 AM    [facility] Medicine Hospitalist Pager numbers:   U Hospitalist Medicine Team A (Angela/Vianney): [phone]  [facility] Hospitalist Medicine Team B (Lyn/Edenilson):  [phone]

## 2023-05-27 NOTE — PLAN OF CARE
Problem: Adult Inpatient Plan of Care  Goal: Plan of Care Review  Outcome: Ongoing, Not Progressing   Chart reviewed.

## 2023-05-28 ENCOUNTER — HOSPITAL ENCOUNTER (INPATIENT)
Facility: HOSPITAL | Age: 58
LOS: 33 days | Discharge: NURSING, REHAB OR LONG-TERM CARE | DRG: 981 | End: 2023-06-30
Attending: HOSPITALIST | Admitting: STUDENT IN AN ORGANIZED HEALTH CARE EDUCATION/TRAINING PROGRAM
Payer: MEDICARE

## 2023-05-28 VITALS
OXYGEN SATURATION: 95 % | RESPIRATION RATE: 16 BRPM | HEART RATE: 71 BPM | HEIGHT: 62 IN | TEMPERATURE: 97 F | SYSTOLIC BLOOD PRESSURE: 102 MMHG | DIASTOLIC BLOOD PRESSURE: 59 MMHG | WEIGHT: 130.94 LBS | BODY MASS INDEX: 24.09 KG/M2

## 2023-05-28 DIAGNOSIS — R57.1 HYPOVOLEMIC SHOCK: ICD-10-CM

## 2023-05-28 DIAGNOSIS — R58 RETROPERITONEAL BLEED: ICD-10-CM

## 2023-05-28 DIAGNOSIS — E43 SEVERE PROTEIN-CALORIE MALNUTRITION: ICD-10-CM

## 2023-05-28 DIAGNOSIS — R57.8 HEMORRHAGIC SHOCK: ICD-10-CM

## 2023-05-28 DIAGNOSIS — E83.39 HYPOPHOSPHATEMIA: ICD-10-CM

## 2023-05-28 DIAGNOSIS — S00.31XA ABRASION OF NOSE, INITIAL ENCOUNTER: ICD-10-CM

## 2023-05-28 DIAGNOSIS — I82.C11 INTERNAL JUGULAR (IJ) VEIN THROMBOEMBOLISM, ACUTE, RIGHT: ICD-10-CM

## 2023-05-28 DIAGNOSIS — Z97.8 ENDOTRACHEALLY INTUBATED: ICD-10-CM

## 2023-05-28 DIAGNOSIS — R94.31 QT PROLONGATION: ICD-10-CM

## 2023-05-28 DIAGNOSIS — K72.01 ACUTE LIVER FAILURE WITH HEPATIC COMA: ICD-10-CM

## 2023-05-28 DIAGNOSIS — G93.40 ACUTE ENCEPHALOPATHY: ICD-10-CM

## 2023-05-28 DIAGNOSIS — F10.11 ALCOHOL ABUSE, IN REMISSION: ICD-10-CM

## 2023-05-28 DIAGNOSIS — J96.01 ACUTE RESPIRATORY FAILURE WITH HYPOXIA: ICD-10-CM

## 2023-05-28 DIAGNOSIS — D64.9 ANEMIA, UNSPECIFIED TYPE: ICD-10-CM

## 2023-05-28 DIAGNOSIS — B96.4 URINARY TRACT INFECTION DUE TO PROTEUS: ICD-10-CM

## 2023-05-28 DIAGNOSIS — I49.9 DYSRHYTHMIA: ICD-10-CM

## 2023-05-28 DIAGNOSIS — I82.411 ACUTE DEEP VEIN THROMBOSIS (DVT) OF FEMORAL VEIN OF RIGHT LOWER EXTREMITY: ICD-10-CM

## 2023-05-28 DIAGNOSIS — R00.0 TACHYCARDIA: ICD-10-CM

## 2023-05-28 DIAGNOSIS — F31.9 BIPOLAR 1 DISORDER: ICD-10-CM

## 2023-05-28 DIAGNOSIS — I95.9 HYPOTENSION, UNSPECIFIED HYPOTENSION TYPE: ICD-10-CM

## 2023-05-28 DIAGNOSIS — N39.0 URINARY TRACT INFECTION DUE TO PROTEUS: ICD-10-CM

## 2023-05-28 DIAGNOSIS — K76.82 HEPATIC ENCEPHALOPATHY: ICD-10-CM

## 2023-05-28 DIAGNOSIS — R56.9 SEIZURE: ICD-10-CM

## 2023-05-28 DIAGNOSIS — E86.1 HYPOTENSION DUE TO HYPOVOLEMIA: ICD-10-CM

## 2023-05-28 DIAGNOSIS — I82.411 DEEP VEIN THROMBOSIS (DVT) OF FEMORAL VEIN OF RIGHT LOWER EXTREMITY, UNSPECIFIED CHRONICITY: ICD-10-CM

## 2023-05-28 DIAGNOSIS — G40.901 NON-CONVULSIVE STATUS EPILEPTICUS: Primary | ICD-10-CM

## 2023-05-28 DIAGNOSIS — J96.01 ACUTE HYPOXEMIC RESPIRATORY FAILURE: ICD-10-CM

## 2023-05-28 DIAGNOSIS — G93.41 ACUTE METABOLIC ENCEPHALOPATHY: ICD-10-CM

## 2023-05-28 DIAGNOSIS — E72.20 HYPERAMMONEMIA: ICD-10-CM

## 2023-05-28 DIAGNOSIS — D68.9 COAGULOPATHY: ICD-10-CM

## 2023-05-28 DIAGNOSIS — R79.89 ELEVATED LFTS: ICD-10-CM

## 2023-05-28 DIAGNOSIS — R94.31 PROLONGED QT INTERVAL: ICD-10-CM

## 2023-05-28 DIAGNOSIS — I27.20 PULMONARY HYPERTENSION: ICD-10-CM

## 2023-05-28 DIAGNOSIS — G40.919 BREAKTHROUGH SEIZURE: ICD-10-CM

## 2023-05-28 LAB
ALBUMIN SERPL BCP-MCNC: 1.6 G/DL (ref 3.5–5.2)
ALLENS TEST: ABNORMAL
ALP SERPL-CCNC: 182 U/L (ref 55–135)
ALT SERPL W/O P-5'-P-CCNC: 69 U/L (ref 10–44)
ANION GAP SERPL CALC-SCNC: 6 MMOL/L (ref 8–16)
ANISOCYTOSIS BLD QL SMEAR: SLIGHT
AST SERPL-CCNC: 88 U/L (ref 10–40)
BASOPHILS # BLD AUTO: 0.02 K/UL (ref 0–0.2)
BASOPHILS NFR BLD: 0.2 % (ref 0–1.9)
BILIRUB SERPL-MCNC: 3.7 MG/DL (ref 0.1–1)
BUN SERPL-MCNC: 22 MG/DL (ref 6–20)
BURR CELLS BLD QL SMEAR: ABNORMAL
CA-I BLDV-SCNC: 1.29 MMOL/L (ref 1.06–1.42)
CALCIUM SERPL-MCNC: 8.6 MG/DL (ref 8.7–10.5)
CALCIUM UR-MCNC: <2 MG/DL (ref 0–15)
CHLORIDE SERPL-SCNC: 125 MMOL/L (ref 95–110)
CO2 SERPL-SCNC: 19 MMOL/L (ref 23–29)
CREAT SERPL-MCNC: 0.5 MG/DL (ref 0.5–1.4)
DELSYS: ABNORMAL
DIFFERENTIAL METHOD: ABNORMAL
EOSINOPHIL # BLD AUTO: 0.1 K/UL (ref 0–0.5)
EOSINOPHIL NFR BLD: 0.5 % (ref 0–8)
ERYTHROCYTE [DISTWIDTH] IN BLOOD BY AUTOMATED COUNT: 20.8 % (ref 11.5–14.5)
ERYTHROCYTE [SEDIMENTATION RATE] IN BLOOD BY WESTERGREN METHOD: 20 MM/H
EST. GFR  (NO RACE VARIABLE): >60 ML/MIN/1.73 M^2
FLOW: 3
GLUCOSE SERPL-MCNC: 119 MG/DL (ref 70–110)
HCO3 UR-SCNC: 18.5 MMOL/L (ref 24–28)
HCT VFR BLD AUTO: 29.5 % (ref 37–48.5)
HGB BLD-MCNC: 9 G/DL (ref 12–16)
HYPOCHROMIA BLD QL SMEAR: ABNORMAL
IMM GRANULOCYTES # BLD AUTO: 0.05 K/UL (ref 0–0.04)
IMM GRANULOCYTES NFR BLD AUTO: 0.4 % (ref 0–0.5)
INR PPP: 2.2 (ref 0.8–1.2)
LITHIUM SERPL-SCNC: 0.4 MMOL/L (ref 0.6–1.2)
LYMPHOCYTES # BLD AUTO: 1.2 K/UL (ref 1–4.8)
LYMPHOCYTES NFR BLD: 10.2 % (ref 18–48)
MCH RBC QN AUTO: 33.6 PG (ref 27–31)
MCHC RBC AUTO-ENTMCNC: 30.5 G/DL (ref 32–36)
MCV RBC AUTO: 110 FL (ref 82–98)
MODE: ABNORMAL
MONOCYTES # BLD AUTO: 0.8 K/UL (ref 0.3–1)
MONOCYTES NFR BLD: 6.8 % (ref 4–15)
NEUTROPHILS # BLD AUTO: 9.5 K/UL (ref 1.8–7.7)
NEUTROPHILS NFR BLD: 81.9 % (ref 38–73)
NRBC BLD-RTO: 1 /100 WBC
OVALOCYTES BLD QL SMEAR: ABNORMAL
PCO2 BLDA: 28.3 MMHG (ref 35–45)
PH SMN: 7.42 [PH] (ref 7.35–7.45)
PLATELET # BLD AUTO: 82 K/UL (ref 150–450)
PMV BLD AUTO: 10.4 FL (ref 9.2–12.9)
PO2 BLDA: 25 MMHG (ref 40–60)
POC BE: -6 MMOL/L
POC SATURATED O2: 49 % (ref 95–100)
POC TCO2: 19 MMOL/L (ref 24–29)
POCT GLUCOSE: 130 MG/DL (ref 70–110)
POCT GLUCOSE: 194 MG/DL (ref 70–110)
POCT GLUCOSE: 331 MG/DL (ref 70–110)
POIKILOCYTOSIS BLD QL SMEAR: SLIGHT
POLYCHROMASIA BLD QL SMEAR: ABNORMAL
POTASSIUM SERPL-SCNC: 3.5 MMOL/L (ref 3.5–5.1)
PROT SERPL-MCNC: 4 G/DL (ref 6–8.4)
PROTHROMBIN TIME: 22.3 SEC (ref 9–12.5)
RBC # BLD AUTO: 2.68 M/UL (ref 4–5.4)
SAMPLE: ABNORMAL
SARS-COV-2 RDRP RESP QL NAA+PROBE: NEGATIVE
SCHISTOCYTES BLD QL SMEAR: ABNORMAL
SITE: ABNORMAL
SMUDGE CELLS BLD QL SMEAR: PRESENT
SODIUM SERPL-SCNC: 150 MMOL/L (ref 136–145)
SP02: 92
TOXIC GRANULES BLD QL SMEAR: PRESENT
WBC # BLD AUTO: 11.61 K/UL (ref 3.9–12.7)

## 2023-05-28 PROCEDURE — 85025 COMPLETE CBC W/AUTO DIFF WBC: CPT | Performed by: STUDENT IN AN ORGANIZED HEALTH CARE EDUCATION/TRAINING PROGRAM

## 2023-05-28 PROCEDURE — U0002 COVID-19 LAB TEST NON-CDC: HCPCS | Performed by: STUDENT IN AN ORGANIZED HEALTH CARE EDUCATION/TRAINING PROGRAM

## 2023-05-28 PROCEDURE — 80053 COMPREHEN METABOLIC PANEL: CPT | Performed by: STUDENT IN AN ORGANIZED HEALTH CARE EDUCATION/TRAINING PROGRAM

## 2023-05-28 PROCEDURE — 25000003 PHARM REV CODE 250: Performed by: STUDENT IN AN ORGANIZED HEALTH CARE EDUCATION/TRAINING PROGRAM

## 2023-05-28 PROCEDURE — 36415 COLL VENOUS BLD VENIPUNCTURE: CPT | Performed by: STUDENT IN AN ORGANIZED HEALTH CARE EDUCATION/TRAINING PROGRAM

## 2023-05-28 PROCEDURE — 27000221 HC OXYGEN, UP TO 24 HOURS

## 2023-05-28 PROCEDURE — 82330 ASSAY OF CALCIUM: CPT | Performed by: STUDENT IN AN ORGANIZED HEALTH CARE EDUCATION/TRAINING PROGRAM

## 2023-05-28 PROCEDURE — 94761 N-INVAS EAR/PLS OXIMETRY MLT: CPT

## 2023-05-28 PROCEDURE — 99223 1ST HOSP IP/OBS HIGH 75: CPT | Mod: AI,,, | Performed by: STUDENT IN AN ORGANIZED HEALTH CARE EDUCATION/TRAINING PROGRAM

## 2023-05-28 PROCEDURE — 63600175 PHARM REV CODE 636 W HCPCS: Performed by: STUDENT IN AN ORGANIZED HEALTH CARE EDUCATION/TRAINING PROGRAM

## 2023-05-28 PROCEDURE — 36406 VNPNXR<3YRS PHY/QHP OTHER VN: CPT

## 2023-05-28 PROCEDURE — 20600001 HC STEP DOWN PRIVATE ROOM

## 2023-05-28 PROCEDURE — 99223 PR INITIAL HOSPITAL CARE,LEVL III: ICD-10-PCS | Mod: AI,,, | Performed by: STUDENT IN AN ORGANIZED HEALTH CARE EDUCATION/TRAINING PROGRAM

## 2023-05-28 PROCEDURE — 99900035 HC TECH TIME PER 15 MIN (STAT)

## 2023-05-28 PROCEDURE — 85610 PROTHROMBIN TIME: CPT | Performed by: STUDENT IN AN ORGANIZED HEALTH CARE EDUCATION/TRAINING PROGRAM

## 2023-05-28 PROCEDURE — 80178 ASSAY OF LITHIUM: CPT | Performed by: STUDENT IN AN ORGANIZED HEALTH CARE EDUCATION/TRAINING PROGRAM

## 2023-05-28 RX ORDER — FUROSEMIDE 10 MG/ML
20 INJECTION INTRAMUSCULAR; INTRAVENOUS ONCE
Status: DISCONTINUED | OUTPATIENT
Start: 2023-05-28 | End: 2023-05-28

## 2023-05-28 RX ORDER — SODIUM CHLORIDE 0.9 % (FLUSH) 0.9 %
10 SYRINGE (ML) INJECTION EVERY 8 HOURS PRN
Status: DISCONTINUED | OUTPATIENT
Start: 2023-05-28 | End: 2023-06-30 | Stop reason: HOSPADM

## 2023-05-28 RX ORDER — FOLIC ACID 1 MG/1
1 TABLET ORAL DAILY
Status: DISCONTINUED | OUTPATIENT
Start: 2023-05-28 | End: 2023-06-02

## 2023-05-28 RX ORDER — TALC
6 POWDER (GRAM) TOPICAL NIGHTLY PRN
Status: DISCONTINUED | OUTPATIENT
Start: 2023-05-28 | End: 2023-06-15

## 2023-05-28 RX ORDER — INSULIN ASPART 100 [IU]/ML
0-5 INJECTION, SOLUTION INTRAVENOUS; SUBCUTANEOUS
Status: DISCONTINUED | OUTPATIENT
Start: 2023-05-29 | End: 2023-06-05

## 2023-05-28 RX ORDER — ACETAMINOPHEN 325 MG/1
650 TABLET ORAL EVERY 8 HOURS PRN
Status: DISCONTINUED | OUTPATIENT
Start: 2023-05-28 | End: 2023-06-03

## 2023-05-28 RX ORDER — ONDANSETRON 2 MG/ML
4 INJECTION INTRAMUSCULAR; INTRAVENOUS EVERY 8 HOURS PRN
Status: DISCONTINUED | OUTPATIENT
Start: 2023-05-28 | End: 2023-06-30 | Stop reason: HOSPADM

## 2023-05-28 RX ORDER — DEXTROSE MONOHYDRATE 50 MG/ML
INJECTION, SOLUTION INTRAVENOUS CONTINUOUS
Status: DISCONTINUED | OUTPATIENT
Start: 2023-05-28 | End: 2023-05-30

## 2023-05-28 RX ORDER — LEVETIRACETAM 100 MG/ML
1000 SOLUTION ORAL 2 TIMES DAILY
Status: DISCONTINUED | OUTPATIENT
Start: 2023-05-28 | End: 2023-05-29

## 2023-05-28 RX ORDER — GLUCAGON 1 MG
1 KIT INJECTION
Status: DISCONTINUED | OUTPATIENT
Start: 2023-05-28 | End: 2023-06-05

## 2023-05-28 RX ORDER — LACTULOSE 10 G/15ML
30 SOLUTION ORAL 2 TIMES DAILY
Status: ON HOLD
Start: 2023-05-28 | End: 2023-06-30 | Stop reason: SDUPTHER

## 2023-05-28 RX ORDER — MAG HYDROX/ALUMINUM HYD/SIMETH 200-200-20
30 SUSPENSION, ORAL (FINAL DOSE FORM) ORAL 4 TIMES DAILY PRN
Status: DISCONTINUED | OUTPATIENT
Start: 2023-05-28 | End: 2023-06-22

## 2023-05-28 RX ORDER — ZONISAMIDE 100 MG/1
100 CAPSULE ORAL DAILY
Status: DISCONTINUED | OUTPATIENT
Start: 2023-05-28 | End: 2023-05-31

## 2023-05-28 RX ORDER — LACTULOSE 10 G/15ML
30 SOLUTION ORAL 3 TIMES DAILY
Status: DISCONTINUED | OUTPATIENT
Start: 2023-05-28 | End: 2023-06-02

## 2023-05-28 RX ORDER — ARIPIPRAZOLE 5 MG/1
5 TABLET ORAL DAILY
Qty: 30 TABLET | Refills: 11 | Status: ON HOLD
Start: 2023-05-28 | End: 2023-05-29 | Stop reason: CLARIF

## 2023-05-28 RX ORDER — NALOXONE HCL 0.4 MG/ML
0.02 VIAL (ML) INJECTION
Status: DISCONTINUED | OUTPATIENT
Start: 2023-05-28 | End: 2023-06-30 | Stop reason: HOSPADM

## 2023-05-28 RX ORDER — AMOXICILLIN 250 MG
1 CAPSULE ORAL 2 TIMES DAILY
Status: DISCONTINUED | OUTPATIENT
Start: 2023-05-28 | End: 2023-06-02

## 2023-05-28 RX ORDER — SODIUM BICARBONATE 650 MG/1
1300 TABLET ORAL 2 TIMES DAILY
Status: DISCONTINUED | OUTPATIENT
Start: 2023-05-28 | End: 2023-06-02

## 2023-05-28 RX ORDER — LEVETIRACETAM 100 MG/ML
1000 SOLUTION ORAL 2 TIMES DAILY
Qty: 600 ML | Refills: 11 | Status: ON HOLD
Start: 2023-05-28 | End: 2023-05-29 | Stop reason: CLARIF

## 2023-05-28 RX ORDER — DEXTROSE 40 %
30 GEL (GRAM) ORAL
Status: DISCONTINUED | OUTPATIENT
Start: 2023-05-28 | End: 2023-06-30 | Stop reason: HOSPADM

## 2023-05-28 RX ORDER — DEXTROSE 40 %
15 GEL (GRAM) ORAL
Status: DISCONTINUED | OUTPATIENT
Start: 2023-05-28 | End: 2023-06-30 | Stop reason: HOSPADM

## 2023-05-28 RX ORDER — PROCHLORPERAZINE EDISYLATE 5 MG/ML
5 INJECTION INTRAMUSCULAR; INTRAVENOUS EVERY 6 HOURS PRN
Status: DISCONTINUED | OUTPATIENT
Start: 2023-05-28 | End: 2023-06-01

## 2023-05-28 RX ORDER — SIMETHICONE 80 MG
1 TABLET,CHEWABLE ORAL 4 TIMES DAILY PRN
Status: DISCONTINUED | OUTPATIENT
Start: 2023-05-28 | End: 2023-06-30 | Stop reason: HOSPADM

## 2023-05-28 RX ADMIN — FOLIC ACID 1 MG: 1 TABLET ORAL at 03:05

## 2023-05-28 RX ADMIN — RIFAXIMIN 550 MG: 550 TABLET ORAL at 08:05

## 2023-05-28 RX ADMIN — SODIUM BICARBONATE 1300 MG: 650 TABLET ORAL at 08:05

## 2023-05-28 RX ADMIN — DEXTROSE MONOHYDRATE: 50 INJECTION, SOLUTION INTRAVENOUS at 04:05

## 2023-05-28 RX ADMIN — RIFAXIMIN 550 MG: 550 TABLET ORAL at 03:05

## 2023-05-28 RX ADMIN — LACTULOSE 30 G: 20 SOLUTION ORAL at 08:05

## 2023-05-28 RX ADMIN — PHYTONADIONE 10 MG: 10 INJECTION, EMULSION INTRAMUSCULAR; INTRAVENOUS; SUBCUTANEOUS at 03:05

## 2023-05-28 RX ADMIN — LEVETIRACETAM 1000 MG: 500 SOLUTION ORAL at 08:05

## 2023-05-28 RX ADMIN — ZONISAMIDE 100 MG: 100 CAPSULE ORAL at 03:05

## 2023-05-28 RX ADMIN — LACTULOSE 30 G: 20 SOLUTION ORAL at 03:05

## 2023-05-28 RX ADMIN — SENNOSIDES AND DOCUSATE SODIUM 1 TABLET: 50; 8.6 TABLET ORAL at 03:05

## 2023-05-28 RX ADMIN — INSULIN ASPART 1 UNITS: 100 INJECTION, SOLUTION INTRAVENOUS; SUBCUTANEOUS at 11:05

## 2023-05-28 NOTE — PROGRESS NOTES
Pt arrived onto [facility] [address] via [facility]. VS stable and pt confused upon arrived. Oriented to person, but only sometimes oriented to place, situation, and time. Team notified. NG tube and PIV pulled upon arrival. Pt in bed and calm. Pt on 4L O2 NC.

## 2023-05-28 NOTE — NURSING
RAPID RESPONSE NURSE PROACTIVE ROUNDING NOTE     Chart Review @ 0800    Pt being transferred to [facility] for specialized care. [facility] expected to arrive within the next hour or two. Pt vitals stable for transport.   Disposition: [facility] T[facility], room 32620 .    No further action required at this time from [facility] Jhonatan MOREIRA.     Call back the Rapid Response Nurse, Ino Cesar RN at [phone] for additional questions or concerns.

## 2023-05-28 NOTE — NURSING
05/28/23 0520   Patient Request   Patient Requested AI ALERT; HR entered incorrectly   Nurse Notification   Charge Nurse Notified? Yes   Name of Charge Nurse Mag Avendano RN/Charito Puri RN   Bedside Nurse Notified? Yes   Name of Bedside Nurse Suzette Chou LPN   Nurse Notfication Method Secure Chat   Nurse Notified Of Other  (AI ALERT)   Provider Notification   Provider Notified? Yes   Name of Provider Dr. De Leon   Provider Notification Method Telephone   Provider Notified Of AI Deterioration Alert  (AI ALERT error)     Vital Signs (Most Recent):  Temp: 97.4 °F (36.3 °C) (05/28/23 0511)  Pulse: 64 (05/28/23 0511)  Resp: 18 (05/28/23 0511)  BP: 105/63 (05/28/23 0511)  SpO2: (Abnormal) 90 % (05/28/23 0511)

## 2023-05-28 NOTE — ASSESSMENT & PLAN NOTE
Down to 146 before transfer, initially elevated to 155 2/2 hypovolemia from hypercalcemia. Patient received NaCl infusions to help with hypercalcemia  - Continue to monitor, will give lasix for recent findings suggestive of pulmonary edema. Lasix may cause Na to rise

## 2023-05-28 NOTE — ASSESSMENT & PLAN NOTE
MELD-Na: 19 at 5/27/2023 12:04 PM  MELD: 19 at 5/27/2023 12:04 PM  Calculated from:  Serum Creatinine: 0.6 mg/dL (Using min of 1 mg/dL) at 5/27/2023  9:38 AM  Serum Sodium: 146 mmol/L (Using max of 137 mmol/L) at 5/27/2023  9:38 AM  Total Bilirubin: 3.6 mg/dL at 5/27/2023  9:38 AM  INR(ratio): 2.0 at 5/27/2023 12:04 PM    Hx of etoh cirrhosis, current elevated MELD driven by INR and tbili. Concerns for acute decompensation with HE. No evidence of volume overload. Last EGD in 2019 but unable to see records.   - Continue lactulose and rifaximin for HE  - Given insterstitial opacities seen on imaging and O2 req, will give lasix 20mg IV x1. Patient had received fluids for her hypernatremia.  - Hepatology consulted for possible transplant evaluation, though history of BP and inconsistent use of lactulose for HE may indicate psychosocial barriers to being listed

## 2023-05-28 NOTE — AI DETERIORATION ALERT
Artificial Intelligence Notification  Bryson      Admit Date: 2023  LOS: 9  Code Status: Full Code   Date of Consult: 2023  : 1965  Age: 58 y.o.  Weight:   Wt Readings from Last 1 Encounters:   23 42.9 kg (94 lb 9.2 oz)     Sex: female  [facility] A:   MRN: [id number]  Attending Physician: Sarita Faith MD  Primary Service: [facility] HOSPITALIST TEAM A  Time AI Alert Received: ***  Time at Bedside: ***           ***      Vital Signs (Most Recent):  Temp: 96.9 °F (36.1 °C) (23)  Pulse: 73 (23)  Resp: 20 (23)  BP: (Abnormal) 97/53 (23)  SpO2: (Abnormal) 85 % (23)   Vital Signs (24h Range):  Temp:  [96.1 °F (35.6 °C)-97.5 °F (36.4 °C)] 96.9 °F (36.1 °C)  Pulse:  [] 73  Resp:  [15-20] 20  SpO2:  [85 %-95 %] 85 %  BP: ()/() 97/53         This encounter was triggered by an Artificial Intelligence Notification.     Artificial Intelligence alert discussed with Primary team:  Name ***      Evaluation: ***    Disposition: ***

## 2023-05-28 NOTE — CARE UPDATE
RAPID RESPONSE NURSE ROUND       Rounding completed with charge RNCarolyn for Liver Failure. Instructed to call [phone] for further concerns or assistance if patient arrives and is not thermodynamically stable .

## 2023-05-28 NOTE — DISCHARGE SUMMARY
[facility] Hospital Medicine Discharge Summary    Primary Team: [facility] Hospitalist Team A  Attending Physician: Sarita Faith MD  Resident: Bright  Intern: Melinda    Date of Admit: 5/18/2023  Date of Discharge: 5/28/2023    Discharge to: [facility]  Condition: Stable    Discharge Diagnoses     Patient Active Problem List   Diagnosis    Cirrhosis, Laennec's    Thrombocytopenia    History of seizure    Glaucoma    hx of intentional Tylenol overdose    Alcohol abuse, in remission    AYESHA (acute kidney injury)    Macrocytic anemia    Chronic liver failure    Severe protein-calorie malnutrition    Hepatic encephalopathy    Bipolar 1 disorder, depressed, severe    Elevated LFTs    Bipolar 1 disorder    Bipolar disorder, manic    Urinary tract infection without hematuria    SVT (supraventricular tachycardia)    Alcoholic cirrhosis    Hepatic cirrhosis    Metabolic acidosis    Hyperammonemia    Hypercalcemia    Acute encephalopathy    Low body temperature    Hypophosphatemia    Refeeding syndrome       Consultants and Procedures     Consultants:  Neurology  Psychiatry  GI  Nutrition    Procedures:   None    Imaging:  CTH without: no acute hemorrhage  RUQ U/S: cholelithiasis with no ascites  UE DVT U/S: no DVT in LUE; Thrombosed left superficial cephalic vein  MRI brain: chronic microvascular changes  EEG: consistent with a severe diffuse encephalopathy, likely of metabolic origin based on the presence of triphasic waves.  No clear epileptiform discharges or seizures are noted.    Brief History of Present Illness      Marely Hamilton is a 58 y.o.female w/ PMHx of EtOH induced cirrhosis, seizure on AEDs, and bipolar disorder on lithium who presented to [facility] on 5/18/2023 with a primary complaint of altered mental status for 1 day due to hepatic encephalopathy and UTI. Patient with past admissions for hepatic encephalopathy due to non-adherence to lactulose and rifaximin, with response and return to  baseline within ~3 days once restarting home regimen. This admission complicated by hypercalcemia likely 2/2 super therapeutic lithium level. Hypercalcemia resolved, lithium level wnl and changed to abilify per psych recs, however patient still encephalopathic. MRI Brain with no acute findings with chronic vascular changes and EEG with generalized background slowing and triphasic waves, no epileptiform discharges, and consistent with diffuse encephalopathy. No clinical improvement after 8 days of lactulose and rifaximin. Liver enzymes and synthetic functions remained elevated and now with concern for acute on chronic liver failure. [facility] also consulted to offer recommendations while patient remains at [facility], though no hepatology service is available at this facility. Contacted [facility] for transfer to be evaluated by hepatology team for further interventions and/or management.     For the full HPI please refer to the History & Physical from this admission.    Hospital Course By Problem with Pertinent Findings     Acute Encephalopathy due to hepatic encephalopathy & hypercalcemia  EtOh-induced Cirrhosis  Pt found altered by sitter (CNA) earlier on day of admission. She has a hx of hepatic encephalopathy 2/2 medication non-compliance. Pt found to have jaundice and scleral icterus on physical exam. Ammonia 139, Tbili 4.8 in ED.  - Likely etiology of hepatic encephalopathy 2/2 medication non-compliance (especially given past hx and PE findings) vs UTI vs hypernatremia  - CTH with no acute abnormality   - Continue lactulose, rifaximine, thiamine, and multivitamin  - Keep NPO pending Speech eval and improvement in mentation; NGT feeds held due to calcium content   - NGT for lactulose administration  - Neurology consulted  - MRI only with chronic ischemic changes  - EEG with no epileptiform changes and generalized background slowing and triphasic changes showing consistency with metabolic diffuse  encephalopathy  - concern for acute on chronic liver failure due to minimal improvement with lactulose and rifaximin - discussed case with [facility] with acceptance to transfer for further evaluation by hepatology    Moderate to Severe Hypercalcemia, improved   Ca 11.9 on CMP in ED.  - ionized Ca elevated at 1.6, PTH 84 consistent with primary hyperparathyroidism  - calcium increased to 14 when corrected for hypoalbuminemia   - likely multifactorial due to lithium and dehydration, may be contributing to her encephalopathy  - discontinue lithium, multivitamin , tube feeds  - s/p NS at 200 mL/hr, s/p IM calcitonin x3, IV zolendronate  - following CMP/ionized calcium   - PTHrP pending, TSH wnl  - NS discontinued at this time due to hypernatremia and hyperchloremia  - consider cinacalcet if pt is restarted on lithium as she is not a surgical candidate for parathyroidectomy      Hypophosphatemia  - phos 2  - replaced with K Phos 30 mmol   - following phos levels     Hypokalemia  - K 4.2  - replacing as needed     Hypomagnesemia   - Mag 2.2  - replace with mag sulfate 2g as needed     Hypernatremia  Hyperchloremia  - NS discontinued for treatment of hypercalcemia  - D5W infusion stopped, continue to monitor      Bipolar Disorder  H/o Self Harm  Supertheraputic lithium level  Pt has bipolar disorder w/ h/o suicide attempt including intentional overdose w/ acetaminophen, self harm, and medication non-compliance.  Plan:  - Lithium held due to hypercalcemia and elevated level  - level at admit 0.3; repeat 5/23 1.4 after lithium given BID  - trend lithium level to ensure downtrend   - Seroquel, and Zyprexa; held seroquel and zyprexa due to somnolence   - Continue to closely monitor pt's behavior as encephalopathy improves  - consult psych this week for guidance on resumption of meds  - started on Abilify per psychiatry      Proteus UTI  UA in ED w/ 3+ occult blood, 2.0-3.0 urobilinogen, 3+ leukocytes, 34 RBCs, >100  WBC.  - Repeat UA w/ cx d/t dirty catch on initial UA  - previous urine culture with >100K CFU Proteus mirabilis   - cultures growing pan-sensitive Proteus Mirabilis   - cefepime narrowed to ceftriaxone, completed 7 day course 5/24     Leukocytosis, improved  - WBC 13 on admit, increased to 17.55  - patient afebrile, s/p tx for UTI  - CXR without consolidation concerning for pneumonia   - WBC now within normal limits, leukocytosis likely due to lithium      Elevated Troponin, improved  Troponin mildly elevated to 0.037 in ED.  Plan:  - Likely d/t demand in setting of hypovolemia  - EKG did not demonstrate new abnormalities  - Troponin peaked, down trend to 0.035     AYESHA, improved   Cr 1.1 on CMP in ED, baseline~ 0.6.  Plan:  - Likely pre-renal in etiology given hypovolemia  - following CMPs  - Renally dose meds      Thrombocytopenia  Pt has known hx of thrombocytopenia. Plt 147 on CBC in ED.  Plan:  - due to cirrhosis  - no acute conern for bleeding, continue to monitor      Seizure Disorder  Pt has hx of seizures and is currently on Keppra 1000 mg BID and Zonisamide 100 mg daily at home. No reported seizure for several years. Per chart review, seizures appear to have taken place in setting of EtOH withdrawal.  Plan:  - Continue home Keppra and Zonisamide  - Continue to monitor  - Recommend Neuro f/u at discharge     Healthcare Maintenance  Pt not UTD on vaccinations or preventative screenings.  Plan:  - F/u w/ PCP at discharge to discuss preventative care    Discharge Medications        Medication List        START taking these medications      ARIPiprazole 5 MG Tab  Commonly known as: ABILIFY  Take 1 tablet (5 mg total) by mouth once daily.     lactulose 20 gram/30 mL Soln  Commonly known as: CHRONULAC  45 mLs (30 g total) by Per NG tube route 2 (two) times daily.  Replaces: CONSTULOSE 10 gram/15 mL solution     levetiracetam 500 mg/5 mL (5 mL) Soln  10 mLs (1,000 mg total) by Per NG tube route 2 (two) times  daily.  Replaces: levETIRAcetam 500 MG Tab            CONTINUE taking these medications      folic acid 1 MG tablet  Commonly known as: FOLVITE  Take 1 tablet (1 mg total) by mouth once daily.     propranoloL 20 MG tablet  Commonly known as: INDERAL     rifAXIMin 550 mg Tab  Commonly known as: XIFAXAN  Take 1 tablet (550 mg total) by mouth 2 (two) times daily.     zonisamide 100 MG Cap  Commonly known as: ZONEGRAN            STOP taking these medications      CONSTULOSE 10 gram/15 mL solution  Generic drug: lactulose  Replaced by: lactulose 20 gram/30 mL Soln     furosemide 20 MG tablet  Commonly known as: LASIX     HAIR VITAMINS ORAL     levETIRAcetam 500 MG Tab  Commonly known as: KEPPRA  Replaced by: levetiracetam 500 mg/5 mL (5 mL) Soln     lithium 300 mg tablet  Commonly known as: LITHOTAB     OLANZapine 5 MG tablet  Commonly known as: ZyPREXA     QUEtiapine 100 MG Tab  Commonly known as: SEROQUEL     sodium bicarbonate 650 MG tablet     spironolactone 25 MG tablet  Commonly known as: ALDACTONE     thiamine 100 MG tablet               Where to Get Your Medications        Information about where to get these medications is not yet available    Ask your nurse or doctor about these medications  ARIPiprazole 5 MG Tab  lactulose 20 gram/30 mL Soln  levetiracetam 500 mg/5 mL (5 mL) Soln         Discharge Information:   Diet:  NPO with resumption of tube feeds once transferred to [facility]    Physical Activity:  As tolerated             Instructions:  1. Take all medications as prescribed  2. Keep all follow-up appointments  3. Return to the hospital or call your primary care physicians if any worsening symptoms such as fever, chest pain, shortness of breath, return of symptoms, or any other concerns.    Follow-Up Appointments:  Will need PCP, neurology, hepatology, and psychiatry follow up upon discharge from [facility]    Lydia Lawrence MD  [facility] Internal Medicine, HO-1

## 2023-05-28 NOTE — PROGRESS NOTES
[facility] Medicine Progress Note    Primary Team: [facility] Hospitalist Team A  Attending Physician: Sarita Faith MD  Resident: Jorge  Intern: Kendall    Subjective:      Eyes opened upon entrance to the room. Per nursing, eyes closed with minimal, inappropriate responses early this morning.     Objective:     Last 24 Hour Vital Signs:  BP  Min: 97/60  Max: 172/107  Temp  Av.8 °F (36 °C)  Min: 96.5 °F (35.8 °C)  Max: 97.4 °F (36.3 °C)  Pulse  Av.9  Min: 64  Max: 155  Resp  Av.3  Min: 15  Max: 20  SpO2  Av.5 %  Min: 89 %  Max: 98 %  Weight  Av.4 kg (130 lb 15.3 oz)  Min: 59.4 kg (130 lb 15.3 oz)  Max: 59.4 kg (130 lb 15.3 oz)  I/O last 3 completed shifts:  In: 250 [NG/GT:250]  Out: 400 [Urine:400]    Physical Examination:  General: thin adult female awake lying in bed  HEENT: NC/AT, scleral icterus, NGT in place   Neck: Supple, trachea midline  CV: 2/6 systolic murmur throughout precordium, normal S1/S2  Resp: CTAB, no wheezing or rhonchi appreciated, normal respiratory effort  Abd: Soft, NT/ND, normoactive BSx4  Extremities: 2+ pulses, bruising and edema in bilateral upper extremities   Skin: Warm, dry, intact, facial spider angiomas  Neuro: Does not consistently follow commands. Responding intermittently but not appropriate  Psych: unable to assess    Laboratory:  Recent Labs   Lab 23  0555 23  1804 23  0437 23  1835 23  0938 23  1312   WBC 8.63  --  9.72  --   --  11.72   HGB 8.9*  --  10.1*  --   --  9.2*   HCT 28.4*  --  32.5*  --   --  30.5*   PLT 82*  --  102*  --   --  69*   *  --  109*  --   --  115*   RDW 19.9*  --  20.5*  --   --  21.2*   *   < > 148* 147* 146*  --    K 3.0*   < > 4.2 5.0 4.9  --    *   < > 122* 123* 125*  --    CO2 19*   < > 23 18* 15*  --    BUN 13   < > 11 13 15  --    CREATININE 0.6   < > 0.6 0.6 0.6  --       < > 164* 156* 130*  --    PROT 3.7*  --  4.4*  --  4.4*  --    ALBUMIN 1.4*  --  1.6*  --   1.5*  --    BILITOT 2.5*  --  3.1*  --  3.6*  --    AST 95*  --  141*  --  117*  --    ALKPHOS 148*  --  189*  --  186*  --    ALT 57*  --  85*  --  74*  --     < > = values in this interval not displayed.   Urine culture growing Proteus mirabilis     Other Results:  EKG (my interpretation): NSR    Radiology Data: no new    Current Medications:     Infusions:         Scheduled:   ARIPiprazole  5 mg Oral Daily    cholecalciferol (vitamin D3)  800 Units Oral Daily    enoxparin  40 mg Subcutaneous Q24H (prophylaxis, 1700)    folic acid  1 mg Per NG tube Daily    lactulose  30 g Per NG tube BID    levetiracetam  1,000 mg Per NG tube BID    propranoloL  20 mg Per NG tube Daily    rifAXIMin  550 mg Per NG tube BID    zonisamide  100 mg Per NG tube Daily        PRN:  dextrose 10%, dextrose 10%, dextrose, dextrose, glucagon (human recombinant), insulin aspart U-100, melatonin, sodium chloride 0.9%    Assessment:     Marely Hamilton is a 58 y.o.female w/ PMHx of EtOH induced cirrhosis, seizure on AEDs, and bipolar disorder who presented to [facility] on 5/18/2023 with a primary complaint of altered mental status for 1 day due to hepatic encephalopathy and UTI. Admission complicated by hypercalcemia likely 2/2 super therapeutic lithium level. Hypercalcemia resolved, lithium level wnl, however patient still encephalopathic. MRI Brain with no acute findings with chronic vascular changes and EEG with generalized background slowing and triphasic waves, no epileptiform discharges, and consistent with diffuse encephalopathy. No clinical improvement after 8 days of lactulose and rifaximin. Liver enzymes and synthetic functions remained elevated and now with concern for acute on chronic liver failure.     Plan:     Acute Encephalopathy due to hepatic encephalopathy & hypercalcemia  EtOh-induced Cirrhosis  Pt found altered by sitter (CNA) earlier on day of admission. She has a hx of hepatic encephalopathy 2/2  medication non-compliance. Pt found to have jaundice and scleral icterus on physical exam. Ammonia 139, Tbili 4.8 in ED.  - Likely etiology of hepatic encephalopathy 2/2 medication non-compliance (especially given past hx and PE findings) vs UTI vs hypernatremia  - CTH with no acute abnormality   - Continue lactulose, rifaximine, thiamine, and multivitamin  - Keep NPO pending Speech eval and improvement in mentation; NGT feeds held due to calcium content   - NGT for lactulose administration  - Neurology consulted and recommend MRI and Eeg  - MRI only with chronic ischemic changes  - EEG with no epileptiform changes and generalized background slowing and triphasic changes showing consistency with metabolic diffuse encephalopathy  - contacted [facility] for concern for no response to lactulose and rifaximin and possible acute on chronic liver failure with plans to transport this morning    Moderate to Severe Hypercalcemia, improved   Ca 11.9 on CMP in ED.  - ionized Ca elevated at 1.6, PTH 84 consistent with primary hyperparathyroidism  - calcium increased to 14 when corrected for hypoalbuminemia   - likely multifactorial due to lithium and dehydration, may be contributing to her encephalopathy  - discontinue lithium, multivitamin , tube feeds  - s/p NS at 200 mL/hr, s/p IM calcitonin x3, IV zolendronate  - following CMP/ionized calcium   - PTHrP pending, TSH wnl  - NS discontinued at this time due to hypernatremia and hyperchloremia  - consider cinacalcet if pt is restarted on lithium as she is not a surgical candidate for parathyroidectomy     Hypophosphatemia  - phos 2  - replaced with K Phos 30 mmol   - follow phos levels    Hypokalemia  - K 4.2  - replacing as needed    Hypomagnesemia   - Mag 2.2  - replace with mag sulfate 2g as needed    Hypernatremia  Hyperchloremia  - NS discontinued for treatment of hypercalcemia  - D5W infusion stopped, continue to monitor      Bipolar Disorder  H/o Self  Harm  Supertheraputic lithium level  Pt has bipolar disorder w/ h/o suicide attempt including intentional overdose w/ acetaminophen, self harm, and medication non-compliance.  Plan:  - Lithium held due to hypercalcemia and elevated level  - level at admit 0.3; repeat 5/23 1.4 after lithium given BID  - trend lithium level to ensure downtrend   - Seroquel, and Zyprexa; held seroquel and zyprexa due to somnolence   - Continue to closely monitor pt's behavior as encephalopathy improves  - consult psych this week for guidance on resumption of meds  - started on Abilify per psychiatry     Proteus UTI  UA in ED w/ 3+ occult blood, 2.0-3.0 urobilinogen, 3+ leukocytes, 34 RBCs, >100 WBC.  - Repeat UA w/ cx d/t dirty catch on initial UA  - previous urine culture with >100K CFU Proteus mirabilis   - cultures growing pan-sensitive Proteus Mirabilis   - cefepime narrowed to ceftriaxone, completed 7 day course 5/24     Leukocytosis, improved  - WBC 13 on admit, increased to 17.55  - patient afebrile, s/p tx for UTI  - CXR without consolidation concerning for pneumonia   - WBC now within normal limits, leukocytosis likely due to lithium     Elevated Troponin, resolved  Troponin mildly elevated to 0.037 in ED.  Plan:  - Likely d/t demand in setting of hypovolemia  - EKG did not demonstrate new abnormalities  - Troponin peaked, down trend to 0.035  - Continue to routinely monitor vitals     AYESHA, improved   Cr 1.1 on CMP in ED, baseline~ 0.6.  Plan:  - Likely pre-renal in etiology given hypovolemia  - following CMPs  - Renally dose meds      Thrombocytopenia  Pt has known hx of thrombocytopenia. Plt 147 on CBC in ED.  Plan:  - due to cirrhosis  - no acute conern for bleeding, continue to monitor      Seizure Disorder  Pt has hx of seizures and is currently on Keppra 1000 mg BID and Zonisamide 100 mg daily at home. No reported seizure for several years. Per chart review, seizures appear to have taken place in setting of EtOH  withdrawal.  Plan:  - Continue home Keppra and Zonisamide  - Continue to monitor  - Recommend Neuro f/u at discharge    Healthcare Maintenance  Pt not UTD on vaccinations or preventative screenings.  Plan:  - F/u w/ PCP at discharge to discuss preventative care    Dispo: transfer to [facility] for hepatology evaluation for concern for acute on chronic liver failure    Lydia Lawrence MD  [facility] HO-I  05/28/2023 7:15 AM    [facility] Hospitalist Pager numbers:   [facility] Hospitalist Medicine Team A (Angela/Vianney): [phone]  [facility] Hospitalist Medicine Team B (Lyn/Edenilson):  [phone]

## 2023-05-28 NOTE — PLAN OF CARE
Pt oriented to person, but only sometimes oriented to place, situation, and time. VS stable. NG tube and PIV pulled upon arrival. Pt in bed and calm. Pt on 4L O2 NC - sats 92-94%. Na 150; Cl 125. LFTs elevated. INR 2.2 - vitamin K hung. Chest x-ray done. Dysphagia mechanical soft diet. VBG done - PCO2 - 28.3; PO2 - 25. Pt on cont pulse ox. D5 cont running at 100cc/hr. Neuro checks done Q4hr. Pt flat and delayed in responses. Slight upper extremity tremors.

## 2023-05-28 NOTE — HPI
Patient is a 58 y.o.female w/ PMHx of EtOH induced cirrhosis, seizure on AEDs, and bipolar disorder on lithium who initially presented to [facility] on 5/18/2023 with a primary complaint of altered mental status suspected to be from hepatic encephalopathy versus hypercalcemia who despite correction of her calcium and treatment with lactulose and rifaximin continued to be encephalopathic.  Given patient's persistent encephalopathy, there was a concern that she had uncontrolled hepatic encephalopathy and was transferred to [facility] for further evaluation.    During patient's hospitalization at [facility], she was noted to be hypercalcemic likely secondary to her lithium use for her bipolar disorder.  She was started on aripiprazole at that time.  Given her inability to follow commands, she had an NG-tube placed for her to safely accept medicines.  Upon arrival here, her NG tube was essentially dislodged and subsequently removed.  On bedside swallow assessment, patient was able to follow commands and swallow safely.  Additionally, on arrival patient had a small bowel movement.  When asked where she was, remained silent.  She later shared unprompted that she has a history of glaucoma.  Patient later stated her full name.  She was able to follow simple commands, but not 2 step commands.

## 2023-05-28 NOTE — AI DETERIORATION ALERT
Artificial Intelligence Notification  Bryson      Admit Date: 2023  LOS: 9  Code Status: Full Code   Date of Consult: 2023  : 1965  Age: 58 y.o.  Weight:   Wt Readings from Last 1 Encounters:   23 42.9 kg (94 lb 9.2 oz)     Sex: female  Bed: [facility]/K485 A:   MRN: [id number]  Attending Physician: Sarita Faith MD  Primary Service: [facility] HOSPITALIST TEAM A  Time AI Alert Received:   Time at Bedside:            Notified of AI alert; patient nurse Suzette at bedside repeating vitals on my arrival.      Vital Signs (Most Recent):  Temp: 96.9 °F (36.1 °C) (23)  Pulse: 73 (23)  Resp: 20 (23)  BP: (!) 97/53 (23)  SpO2: (!) 85 % (23)   Vital Signs (24h Range):  Temp:  [96.1 °F (35.6 °C)-97.5 °F (36.4 °C)] 96.9 °F (36.1 °C)  Pulse:  [] 73  Resp:  [15-20] 20  SpO2:  [85 %-95 %] 85 %  BP: ()/() 97/53         This encounter was triggered by an Artificial Intelligence Notification.     Artificial Intelligence alert discussed with Primary team:  Name Mayra Novoa MD      Evaluation: NC O2 was not in place on nursing arrival, with replacement O2 sat increased to 92%. Patient remains severely encephalopathic. Not responding to verbal stimuli or opening eyes to sternal rub. No acute change in status.    Disposition: Remain on floor with close monitoring.      Mayra Novoa MD  [facility] Internal Medicine, PGY-3  [facility] Internal Medicine Team A

## 2023-05-28 NOTE — NURSING
Rapid Response Nurse Follow-up Note     Followed up with patient for proactive rounding.   No acute issues at this time. Pt to transfer to [facility] for hepatology evaluation. Reviewed plan of care with  LISETH Bradford.  Team will sign off due to transfer.   Please call Rapid Response RN, GIULIANA EM, NEHAL with any questions or concerns at N Phone:[phone].

## 2023-05-28 NOTE — AI DETERIORATION ALERT
Artificial Intelligence Notification  Bryson      Admit Date: 2023  LOS: 10  Code Status: Full Code   Date of Consult: 2023  : 1965  Age: 58 y.o.  Weight:   Wt Readings from Last 1 Encounters:   23 59.4 kg (130 lb 15.3 oz)     Sex: female  [facility] A:   MRN: [id number]  Attending Physician: Sarita Faith MD  Primary Service: [facility] HOSPITALIST TEAM A  Time AI Alert Received: ***  Time at Bedside: ***           ***      Vital Signs (Most Recent):  Temp: 97.4 °F (36.3 °C) (23 0511)  Pulse: 64 (23 05)  Resp: 18 (23 05)  BP: 105/63 (23)  SpO2: (Abnormal) 90 % (23 05)   Vital Signs (24h Range):  Temp:  [96.1 °F (35.6 °C)-97.4 °F (36.3 °C)] 97.4 °F (36.3 °C)  Pulse:  [] 64  Resp:  [15-20] 18  SpO2:  [89 %-98 %] 90 %  BP: ()/() 105/63         This encounter was triggered by an Artificial Intelligence Notification.     Artificial Intelligence alert discussed with Primary team:  Name ***      Evaluation: ***    Disposition: ***

## 2023-05-28 NOTE — ASSESSMENT & PLAN NOTE
Patient presents as an outside hospital transfer for persistent encephalopathy.  She is been treated for her hypercalcemia as well as hepatic encephalopathy and continues to remain encephalopathic with concerns for persistent encephalopathy from a hepatic source.  While at the outside hospital, she was taken off of lithium and transitioned to aripiprazole for her bipolar disorder.  She had an EEG performed which showed diffuse slowing consistent with encephalopathy, however no epileptiform discharges.  Patient has a seizure history for which she was on Keppra and zonisamide.  On evaluation at [facility], patient able to follow one-step commands, oriented to self but not situation or time, no clonus in the lower extremities, but patient has fine motor tremors. MRI imaging at [facility] unremarkable.  - Continue Keppra 1g q12h and zonisamide 100mg q12  - Continue lactulose (increased to 30g TID) and rifaximin  - q4h neuro checks  - Continue aripiprazole  - Check lithium level  - Check PETH level

## 2023-05-28 NOTE — NURSING
Report called to Monalisa at [facility], room 45736. Patient covid negative and awaiting transport via [facility], ETA 0530.     Pt resting comfortably at this time after being repositioned, bed in lowest position, wheels locked, lighting adjusted, bed alarm on, and side rails padded and up.

## 2023-05-28 NOTE — ASSESSMENT & PLAN NOTE
Down to 146 before transfer, initially elevated to 155 2/2 hypovolemia from hypercalcemia. Patient received NaCl infusions to help with hypercalcemia  - Na on transfer 150  - Continue d5 100cc/hr

## 2023-05-28 NOTE — SUBJECTIVE & OBJECTIVE
Past Medical History:   Diagnosis Date    Addiction to drug     Alcohol abuse     Alcohol abuse, in remission 6/15/2015    14.5 weeks ago; AA weekly    Anemia     Anxiety 6/15/2015    Behavioral problem     Bipolar disorder     Bipolar disorder in remission 6/15/2015    Cirrhosis, Laennec's 6/15/2015    Depression     Encounter for blood transfusion     Epistaxis 6/15/2015    Fatigue     Glaucoma     Hematuria     Hepatic encephalopathy 6/15/2015    Hepatic enlargement     History of psychiatric care     History of psychiatric hospitalization     History of seizure 6/15/2015    1    hx of intentional Tylenol overdose 6/15/2015    2005- situational and hx of bipolar    Hx of psychiatric care     Macrocytic anemia 9/18/2015    6 units PRBC since June 2015    Jeana     Osteoarthritis     Other ascites 6/15/2015    Psychiatric exam requested by authority     Psychiatric problem     Psychosis 9/26/2019    Renal disorder     Seizures     Self-harming behavior     Suicide attempt     Therapy     Thrombocytopenia 6/15/2015       Past Surgical History:   Procedure Laterality Date    COSMETIC SURGERY      ESOPHAGOGASTRODUODENOSCOPY         Review of patient's allergies indicates:   Allergen Reactions    Sulfa (sulfonamide antibiotics) Rash    Codeine Nausea And Vomiting       Current Facility-Administered Medications on File Prior to Encounter   Medication    [DISCONTINUED] ARIPiprazole tablet 5 mg    [DISCONTINUED] cholecalciferol (vitamin D3) capsule/tablet 800 Units    [DISCONTINUED] dextrose 10% bolus 125 mL 125 mL    [DISCONTINUED] dextrose 10% bolus 250 mL 250 mL    [DISCONTINUED] dextrose 40 % gel 16,000 mg    [DISCONTINUED] dextrose 40 % gel 24,000 mg    [DISCONTINUED] enoxaparin injection 40 mg    [DISCONTINUED] folic acid tablet 1 mg    [DISCONTINUED] glucagon (human recombinant) injection 1 mg    [DISCONTINUED] insulin aspart U-100 pen 0-5 Units    [DISCONTINUED] lactulose 20 gram/30 mL solution Soln 30 g     [DISCONTINUED] levetiracetam 500 mg/5 mL (5 mL) liquid Soln 1,000 mg    [DISCONTINUED] melatonin tablet 6 mg    [DISCONTINUED] propranoloL tablet 20 mg    [DISCONTINUED] rifAXIMin tablet 550 mg    [DISCONTINUED] sodium chloride 0.9% flush 10 mL    [DISCONTINUED] zonisamide capsule 100 mg     Current Outpatient Medications on File Prior to Encounter   Medication Sig    ARIPiprazole (ABILIFY) 5 MG Tab Take 1 tablet (5 mg total) by mouth once daily.    folic acid (FOLVITE) 1 MG tablet Take 1 tablet (1 mg total) by mouth once daily.    lactulose (CHRONULAC) 20 gram/30 mL Soln 45 mLs (30 g total) by Per NG tube route 2 (two) times daily.    levetiracetam 500 mg/5 mL (5 mL) Soln 10 mLs (1,000 mg total) by Per NG tube route 2 (two) times daily.    propranoloL (INDERAL) 20 MG tablet Take 20 mg by mouth once daily.    rifAXIMin (XIFAXAN) 550 mg Tab Take 1 tablet (550 mg total) by mouth 2 (two) times daily.    zonisamide (ZONEGRAN) 100 MG Cap Take 100 mg by mouth once daily.    [DISCONTINUED] CONSTULOSE 10 gram/15 mL solution Take 45 mLs by mouth 2 (two) times daily.    [DISCONTINUED] furosemide (LASIX) 20 MG tablet Take 10 mg by mouth 2 (two) times daily.    [DISCONTINUED] levETIRAcetam (KEPPRA) 500 MG Tab Take 2 tablets (1,000 mg total) by mouth 2 (two) times daily.    [DISCONTINUED] lithium (LITHOTAB) 300 mg tablet Take 300 mg by mouth once daily.    [DISCONTINUED] multivitamin with iron (HAIR VITAMINS ORAL) Take by mouth.    [DISCONTINUED] OLANZapine (ZYPREXA) 5 MG tablet Take 5 mg by mouth every evening.    [DISCONTINUED] QUEtiapine (SEROQUEL) 100 MG Tab Take 100 mg by mouth every evening.    [DISCONTINUED] sodium bicarbonate 650 MG tablet Take 2 tablets (1,300 mg total) by mouth 2 (two) times daily.    [DISCONTINUED] spironolactone (ALDACTONE) 25 MG tablet Take 25 mg by mouth once daily.    [DISCONTINUED] thiamine 100 MG tablet Take 1 tablet (100 mg total) by mouth once daily.     Family History       Problem Relation  (Age of Onset)    Alcohol abuse Father, Sister, Paternal Grandfather    Bipolar disorder Father    Hypertension Mother    Suicide Father          Tobacco Use    Smoking status: Never    Smokeless tobacco: Never   Substance and Sexual Activity    Alcohol use: Not Currently     Comment: hx of ETOH abuse with cirrhosis of liver    Drug use: No    Sexual activity: Not Currently     Review of Systems   Unable to perform ROS: Mental status change   Objective:     Vital Signs (Most Recent):  Temp: 96.2 °F (35.7 °C) (05/28/23 1045)  Pulse: 83 (05/28/23 1045)  Resp: 16 (05/28/23 1045)  BP: (!) 97/51 (05/28/23 1045)  SpO2: 96 % (05/28/23 1045) Vital Signs (24h Range):  Temp:  [96.2 °F (35.7 °C)-97.4 °F (36.3 °C)] 96.2 °F (35.7 °C)  Pulse:  [64-90] 83  Resp:  [15-20] 16  SpO2:  [89 %-98 %] 96 %  BP: ()/(51-68) 97/51     Weight: 60.1 kg (132 lb 7.9 oz)  Body mass index is 24.23 kg/m².     Physical Exam  Constitutional:       General: She is not in acute distress.     Appearance: She is well-developed. She is ill-appearing and toxic-appearing.   HENT:      Head: Normocephalic and atraumatic.   Eyes:      General: Scleral icterus present.      Conjunctiva/sclera: Conjunctivae normal.      Pupils: Pupils are equal, round, and reactive to light.   Neck:      Thyroid: No thyromegaly.      Vascular: No JVD.   Cardiovascular:      Rate and Rhythm: Normal rate and regular rhythm.      Heart sounds: Normal heart sounds. No murmur heard.    No friction rub. No gallop.   Pulmonary:      Effort: Pulmonary effort is normal.      Breath sounds: Examination of the right-lower field reveals decreased breath sounds. Examination of the left-lower field reveals decreased breath sounds. Decreased breath sounds present. No wheezing or rales.   Abdominal:      General: Bowel sounds are normal. There is distension.      Palpations: Abdomen is soft.      Tenderness: There is no abdominal tenderness. There is no guarding or rebound.    Musculoskeletal:         General: No tenderness. Normal range of motion.      Cervical back: Neck supple.      Right lower leg: No edema.      Left lower leg: No edema.   Skin:     General: Skin is warm and dry.      Coloration: Skin is not jaundiced.   Neurological:      Mental Status: She is lethargic and disoriented.      Cranial Nerves: No cranial nerve deficit.      Comments: Follows 1-step commands, able to demonstrate coordinated swallowing, fine motor tremors noted on exam. No clonus demonstrated in b/l LE. 3/5 weakness in bilateral UE, did not assess LE weakness   Psychiatric:         Behavior: Behavior normal.            CRANIAL NERVES     CN III, IV, VI   Pupils are equal, round, and reactive to light.     Significant Labs: All pertinent labs within the past 24 hours have been reviewed.  CBC:   Recent Labs   Lab 05/27/23  1312   WBC 11.72   HGB 9.2*   HCT 30.5*   PLT 69*     CMP:   Recent Labs   Lab 05/26/23  1835 05/27/23  0938   * 146*   K 5.0 4.9   * 125*   CO2 18* 15*   * 130*   BUN 13 15   CREATININE 0.6 0.6   CALCIUM 8.6* 8.3*   PROT  --  4.4*   ALBUMIN  --  1.5*   BILITOT  --  3.6*   ALKPHOS  --  186*   AST  --  117*   ALT  --  74*   ANIONGAP 6* 6*     Coagulation:   Recent Labs   Lab 05/27/23  1204   INR 2.0*   APTT 43.3*       Significant Imaging: I have reviewed all pertinent imaging results/findings within the past 24 hours.

## 2023-05-28 NOTE — NURSING
05/27/23 1942   Sepsis Screen (IP)   Is the patient's history or complaint suggestive of a possible infection? No   Are there at least two of the following signs and symptoms present? No   Are any of the following organ dysfunction criteria present and not considered to be due to a chronic condition? Yes   Organ Dysfunction Criteria Total Bilirubin > 2.0 Platelet count < 100,000   Organ Dysfunction Criteria INR > 1.5 or aPTT > 60   Initiate Sepsis Protocol No

## 2023-05-28 NOTE — ASSESSMENT & PLAN NOTE
Patient with Hypoxic Respiratory failure which is Acute.  she is not on home oxygen. Supplemental oxygen was provided and noted- Oxygen Concentration (%):  [28-36] 32    .   Signs/symptoms of respiratory failure include- hypoxia. Contributing diagnoses includes - N/A Labs and images were reviewed. Patient Has not had a recent ABG. Will treat underlying causes and adjust management of respiratory failure as follows  - CXR personally reviewed, demonstrates bilateral interstitial opacities with possible blunting of costophrenic angle on R side. Concern for pulmonary edema given recent fluids she received for hypercalcemia  - will give lasix 20mg IV x1, monitor for worsening hypernatremia  - TTE ordered

## 2023-05-28 NOTE — NURSING
05/27/23 1935   Patient Request   Patient Requested AI ALERT   Nurse Notification   Charge Nurse Notified? Yes   Name of Charge Nurse Mag Avendano, NEHAL/Charito Puri, RN   Bedside Nurse Notified? Yes   Name of Bedside Nurse Suzette Chou LPN   Nurse Notfication Method Secure Chat;Telephone   Nurse Notified Of Other  (AI ALERT)   Provider Notification   Provider Notified? Yes   Name of Provider Dr. Novoa   Provider Notification Method Telephone   Provider Notified Of AI Deterioration Alert     Vital Signs (Most Recent):  Temp: 96.9 °F (36.1 °C) (05/27/23 1925)  Pulse: 73 (05/27/23 1925)  Resp: 20 (05/27/23 1925)  BP: (Abnormal) 97/53 (05/27/23 1925)  SpO2: (Abnormal) 85 % (05/27/23 1925)

## 2023-05-28 NOTE — ASSESSMENT & PLAN NOTE
Patient with Hypoxic Respiratory failure which is Acute.  she is not on home oxygen. Supplemental oxygen was provided and noted- Oxygen Concentration (%):  [28-36] 32    .   Signs/symptoms of respiratory failure include- hypoxia. Contributing diagnoses includes - N/A Labs and images were reviewed. Patient Has not had a recent ABG. Will treat underlying causes and adjust management of respiratory failure as follows  - CXR personally reviewed, demonstrates bilateral interstitial opacities with possible blunting of costophrenic angle on R side. Concern for pulmonary edema given recent fluids she received for hypercalcemia vs multifocal PNA however patient does not appear infectious  - TTE with showing left sided pleural effusion, normal EF, normal CVP  - gave lasix x1 this AM, monitoring for worsening hypernatremia

## 2023-05-28 NOTE — H&P
[facility]  History & Physical    Patient Name: Marely Hamilton  MRN: [id number]  Patient Class: IP- Inpatient  Admission Date: 5/28/2023  Attending Physician: Derick Winston MD  Primary Care Provider: Viktor Ross MD         Patient information was obtained from  OSH records .     Subjective:     Principal Problem:Acute encephalopathy    Chief Complaint: No chief complaint on file.       HPI: Patient is a 58 y.o.female w/ PMHx of EtOH induced cirrhosis, seizure on AEDs, and bipolar disorder on lithium who initially presented to [facility] on 5/18/2023 with a primary complaint of altered mental status suspected to be from hepatic encephalopathy versus hypercalcemia who despite correction of her calcium and treatment with lactulose and rifaximin continued to be encephalopathic.  Given patient's persistent encephalopathy, there was a concern that she had uncontrolled hepatic encephalopathy and was transferred to [facility] for further evaluation.    During patient's hospitalization at [facility], she was noted to be hypercalcemic likely secondary to her lithium use for her bipolar disorder.  She was started on aripiprazole at that time.  Given her inability to follow commands, she had an NG-tube placed for her to safely accept medicines.  Upon arrival here, her NG tube was essentially dislodged and subsequently removed.  On bedside swallow assessment, patient was able to follow commands and swallow safely.  Additionally, on arrival patient had a small bowel movement.  When asked where she was, remained silent.  She later shared unprompted that she has a history of glaucoma.  Patient later stated her full name.  She was able to follow simple commands, but not 2 step commands.      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    Depression     Encounter for blood transfusion     Epistaxis 6/15/2015    Fatigue     Glaucoma     Hematuria     Hepatic encephalopathy 6/15/2015    Hepatic enlargement     History of psychiatric care     History of psychiatric hospitalization     History of seizure 6/15/2015    1    hx of intentional Tylenol overdose 6/15/2015    2005- situational and hx of bipolar    Hx of psychiatric care     Macrocytic anemia 9/18/2015    6 units PRBC since June 2015    Jeana     Osteoarthritis     Other ascites 6/15/2015    Psychiatric exam requested by authority     Psychiatric problem     Psychosis 9/26/2019    Renal disorder     Seizures     Self-harming behavior     Suicide attempt     Therapy     Thrombocytopenia 6/15/2015       Past Surgical History:   Procedure Laterality Date    COSMETIC SURGERY      ESOPHAGOGASTRODUODENOSCOPY         Review of patient's allergies indicates:   Allergen Reactions    Sulfa (sulfonamide antibiotics) Rash    Codeine Nausea And Vomiting       Current Facility-Administered Medications on File Prior to Encounter   Medication    [DISCONTINUED] ARIPiprazole tablet 5 mg    [DISCONTINUED] cholecalciferol (vitamin D3) capsule/tablet 800 Units    [DISCONTINUED] dextrose 10% bolus 125 mL 125 mL    [DISCONTINUED] dextrose 10% bolus 250 mL 250 mL    [DISCONTINUED] dextrose 40 % gel 16,000 mg    [DISCONTINUED] dextrose 40 % gel 24,000 mg    [DISCONTINUED] enoxaparin injection 40 mg    [DISCONTINUED] folic acid tablet 1 mg    [DISCONTINUED] glucagon (human recombinant) injection 1 mg    [DISCONTINUED] insulin aspart U-100 pen 0-5 Units    [DISCONTINUED] lactulose 20 gram/30 mL solution Soln 30 g    [DISCONTINUED] levetiracetam 500 mg/5 mL (5 mL) liquid Soln 1,000 mg    [DISCONTINUED] melatonin tablet 6 mg    [DISCONTINUED] propranoloL tablet 20 mg    [DISCONTINUED]  rifAXIMin tablet 550 mg    [DISCONTINUED] sodium chloride 0.9% flush 10 mL    [DISCONTINUED] zonisamide capsule 100 mg     Current Outpatient Medications on File Prior to Encounter   Medication Sig    ARIPiprazole (ABILIFY) 5 MG Tab Take 1 tablet (5 mg total) by mouth once daily.    folic acid (FOLVITE) 1 MG tablet Take 1 tablet (1 mg total) by mouth once daily.    lactulose (CHRONULAC) 20 gram/30 mL Soln 45 mLs (30 g total) by Per NG tube route 2 (two) times daily.    levetiracetam 500 mg/5 mL (5 mL) Soln 10 mLs (1,000 mg total) by Per NG tube route 2 (two) times daily.    propranoloL (INDERAL) 20 MG tablet Take 20 mg by mouth once daily.    rifAXIMin (XIFAXAN) 550 mg Tab Take 1 tablet (550 mg total) by mouth 2 (two) times daily.    zonisamide (ZONEGRAN) 100 MG Cap Take 100 mg by mouth once daily.    [DISCONTINUED] CONSTULOSE 10 gram/15 mL solution Take 45 mLs by mouth 2 (two) times daily.    [DISCONTINUED] furosemide (LASIX) 20 MG tablet Take 10 mg by mouth 2 (two) times daily.    [DISCONTINUED] levETIRAcetam (KEPPRA) 500 MG Tab Take 2 tablets (1,000 mg total) by mouth 2 (two) times daily.    [DISCONTINUED] lithium (LITHOTAB) 300 mg tablet Take 300 mg by mouth once daily.    [DISCONTINUED] multivitamin with iron (HAIR VITAMINS ORAL) Take by mouth.    [DISCONTINUED] OLANZapine (ZYPREXA) 5 MG tablet Take 5 mg by mouth every evening.    [DISCONTINUED] QUEtiapine (SEROQUEL) 100 MG Tab Take 100 mg by mouth every evening.    [DISCONTINUED] sodium bicarbonate 650 MG tablet Take 2 tablets (1,300 mg total) by mouth 2 (two) times daily.    [DISCONTINUED] spironolactone (ALDACTONE) 25 MG tablet Take 25 mg by mouth once daily.    [DISCONTINUED] thiamine 100 MG tablet Take 1 tablet (100 mg total) by mouth once daily.     Family History       Problem Relation (Age of Onset)    Alcohol abuse Father, Sister, Paternal Grandfather    Bipolar disorder Father    Hypertension Mother    Suicide Father          Tobacco Use    Smoking  status: Never    Smokeless tobacco: Never   Substance and Sexual Activity    Alcohol use: Not Currently     Comment: hx of ETOH abuse with cirrhosis of liver    Drug use: No    Sexual activity: Not Currently     Review of Systems   Unable to perform ROS: Mental status change   Objective:     Vital Signs (Most Recent):  Temp: 96.2 °F (35.7 °C) (05/28/23 1045)  Pulse: 83 (05/28/23 1045)  Resp: 16 (05/28/23 1045)  BP: (!) 97/51 (05/28/23 1045)  SpO2: 96 % (05/28/23 1045) Vital Signs (24h Range):  Temp:  [96.2 °F (35.7 °C)-97.4 °F (36.3 °C)] 96.2 °F (35.7 °C)  Pulse:  [64-90] 83  Resp:  [15-20] 16  SpO2:  [89 %-98 %] 96 %  BP: ()/(51-68) 97/51     Weight: 60.1 kg (132 lb 7.9 oz)  Body mass index is 24.23 kg/m².     Physical Exam  Constitutional:       General: She is not in acute distress.     Appearance: She is well-developed. She is ill-appearing and toxic-appearing.   HENT:      Head: Normocephalic and atraumatic.   Eyes:      General: Scleral icterus present.      Conjunctiva/sclera: Conjunctivae normal.      Pupils: Pupils are equal, round, and reactive to light.   Neck:      Thyroid: No thyromegaly.      Vascular: No JVD.   Cardiovascular:      Rate and Rhythm: Normal rate and regular rhythm.      Heart sounds: Normal heart sounds. No murmur heard.    No friction rub. No gallop.   Pulmonary:      Effort: Pulmonary effort is normal.      Breath sounds: Examination of the right-lower field reveals decreased breath sounds. Examination of the left-lower field reveals decreased breath sounds. Decreased breath sounds present. No wheezing or rales.   Abdominal:      General: Bowel sounds are normal. There is distension.      Palpations: Abdomen is soft.      Tenderness: There is no abdominal tenderness. There is no guarding or rebound.   Musculoskeletal:         General: No tenderness. Normal range of motion.      Cervical back: Neck supple.      Right lower leg: No edema.      Left lower leg: No edema.   Skin:      General: Skin is warm and dry.      Coloration: Skin is not jaundiced.   Neurological:      Mental Status: She is lethargic and disoriented.      Cranial Nerves: No cranial nerve deficit.      Comments: Follows 1-step commands, able to demonstrate coordinated swallowing, fine motor tremors noted on exam. No clonus demonstrated in b/l LE. 3/5 weakness in bilateral UE, did not assess LE weakness   Psychiatric:         Behavior: Behavior normal.            CRANIAL NERVES     CN III, IV, VI   Pupils are equal, round, and reactive to light.     Significant Labs: All pertinent labs within the past 24 hours have been reviewed.  CBC:   Recent Labs   Lab 05/27/23  1312   WBC 11.72   HGB 9.2*   HCT 30.5*   PLT 69*     CMP:   Recent Labs   Lab 05/26/23  1835 05/27/23  0938   * 146*   K 5.0 4.9   * 125*   CO2 18* 15*   * 130*   BUN 13 15   CREATININE 0.6 0.6   CALCIUM 8.6* 8.3*   PROT  --  4.4*   ALBUMIN  --  1.5*   BILITOT  --  3.6*   ALKPHOS  --  186*   AST  --  117*   ALT  --  74*   ANIONGAP 6* 6*     Coagulation:   Recent Labs   Lab 05/27/23  1204   INR 2.0*   APTT 43.3*       Significant Imaging: I have reviewed all pertinent imaging results/findings within the past 24 hours.    Assessment/Plan:     * Acute encephalopathy  Patient presents as an outside hospital transfer for persistent encephalopathy.  She is been treated for her hypercalcemia as well as hepatic encephalopathy and continues to remain encephalopathic with concerns for persistent encephalopathy from a hepatic source.  While at the outside hospital, she was taken off of lithium and transitioned to aripiprazole for her bipolar disorder.  She had an EEG performed which showed diffuse slowing consistent with encephalopathy, however no epileptiform discharges.  Patient has a seizure history for which she was on Keppra and zonisamide.  On evaluation at [facility], patient able to follow one-step commands, oriented to self but not situation  or time, no clonus in the lower extremities, but patient has fine motor tremors. MRI imaging at OSH unremarkable.  - Continue Keppra 1g q12h and zonisamide 100mg q12  - Continue lactulose (increased to 30g TID) and rifaximin  - q4h neuro checks  - Continue aripiprazole  - Check lithium level  - Check PETH level      Hepatic cirrhosis  MELD-Na: 19 at 5/27/2023 12:04 PM  MELD: 19 at 5/27/2023 12:04 PM  Calculated from:  Serum Creatinine: 0.6 mg/dL (Using min of 1 mg/dL) at 5/27/2023  9:38 AM  Serum Sodium: 146 mmol/L (Using max of 137 mmol/L) at 5/27/2023  9:38 AM  Total Bilirubin: 3.6 mg/dL at 5/27/2023  9:38 AM  INR(ratio): 2.0 at 5/27/2023 12:04 PM    Hx of etoh cirrhosis, current elevated MELD driven by INR and tbili. Concerns for acute decompensation with HE. No evidence of volume overload. Last EGD in 2019 but unable to see records.   - Continue lactulose and rifaximin for HE  - Given insterstitial opacities seen on imaging and O2 req, will give lasix 20mg IV x1. Patient had received fluids for her hypernatremia.  - Hepatology consulted for possible transplant evaluation, though history of BP and inconsistent use of lactulose for HE may indicate psychosocial barriers to being listed        Acute hypoxemic respiratory failure  Patient with Hypoxic Respiratory failure which is Acute.  she is not on home oxygen. Supplemental oxygen was provided and noted- Oxygen Concentration (%):  [28-36] 32    .   Signs/symptoms of respiratory failure include- hypoxia. Contributing diagnoses includes - N/A Labs and images were reviewed. Patient Has not had a recent ABG. Will treat underlying causes and adjust management of respiratory failure as follows  - CXR personally reviewed, demonstrates bilateral interstitial opacities with possible blunting of costophrenic angle on R side. Concern for pulmonary edema given recent fluids she received for hypercalcemia vs multifocal PNA however patient does not appear infectious  - holding  lasix and monitoring for worsening dyspnea while patient receiving d5 for hypernatremia  - TTE ordered    Hypernatremia  Down to 146 before transfer, initially elevated to 155 2/2 hypovolemia from hypercalcemia. Patient received NaCl infusions to help with hypercalcemia  - Na on transfer 150  - Started on d5 100cc/hr      Bipolar 1 disorder  Continue aripiprazole, will consult psychiatry here given initiation of new BP medication        Seizure  Continue keppra and zonisamide      Hepatic encephalopathy  Chronic, unstable  - Continuing lactulsoe and rifaximin as state elsewhere      Macrocytic anemia  Chronic, stable  - recheck B12/folate        Thrombocytopenia  Chronic issue  - slightly worse today. No petechiae on exam  - Continue to monitor with daily cbc  - Transfuse for platelets <10k or <50k w/ active bleeding        VTE Risk Mitigation (From admission, onward)           Ordered     IP VTE LOW RISK PATIENT  Once         05/28/23 1129     Place sequential compression device  Until discontinued         05/28/23 1129                               Derick Winston MD  Department of Hospital Medicine  [facility] - Transplant Stepdown

## 2023-05-28 NOTE — PLAN OF CARE
[facility] Plan of Care Note    Attempted to call patient's sister, Zaria, at [phone] twice this morning to update that patient will be transferring to [facility] with no response. Last update was provided last night around 7pm that our team was awaiting a call from [facility] for discussion with hepatology for transfer and further evaluation. Patient's sister was aware that transfer was possible last night or today. All questions were answered at that time.    Lydia Lawrence MD  [facility] HO-1

## 2023-05-28 NOTE — PROVIDER TRANSFER
(Physician in Lead of Transfers)   Outside Transfer Acceptance Note / Regional Referral Center    Referring facility: [facility][note 3]   Referring provider: NEGRO CHAVARRIA, NOLBERTO GE, NICHOLAS ZELAYA, IAN BELL, PALLAVI JAMES, ERNEST III DAVULURI, AMISHA VILLALBA, NITO ALEXANDER, SEBASTIEN RADFORD, IAN MITCHELL  Accepting facility: OTHER  [facility]  OTHER  [facility]  OTHER  OTHER  [facility]  Reason for transfer:  Higher level of care  Transfer diagnosis: Liver Failure   Transfer specialty requested: Transplant Liver  Hepatology  Transfer specialty notified: Yes  Transfer level: 2  Isolation status: No active isolations   Admission class or status: IP- Inpatient  IP- Inpatient      Narrative     Patient is a 58 y.o. female who has a past medical history of Addiction to drug, Alcohol abuse, Alcohol induced cirrhosis, seizure on AEDs, bipolar disorder, Anemia, Anxiety, Hepatic encephalopathy,hx of intentional Tylenol overdose, and Thrombocytopenia [facility]opathy. Patient arrived to hospital encephalopathic not responding to verbal stimuli or opening eyes to sternal rub likely from of hepatic encephalopathy 2/2 medication non-compliance. Patient has been treated with lactulose and rifaximine with no improvement. Work up has exhausted other etiologies. See below labs and imaging results. Facility seeking transfer for hepatology evaluation for transplant. Hepatology connected to referring who has agreed to consult on patient.     Objective     Vitals: Temp: 96.9 °F (36.1 °C) (05/27/23 1925)  Pulse: 67 (05/27/23 2003)  Resp: 20 (05/27/23 2003)  BP: 98/68 (05/27/23 1942)  SpO2: 98 %  (05/27/23 2003)    Recent Labs: see EPIC  CBC:  Recent Labs   Lab 05/27/23  1312   WBC 11.72   HGB 9.2*   HCT 30.5*   PLT 69*     CMP:  Recent Labs   Lab 05/27/23  0938   CALCIUM 8.3*   ALBUMIN 1.5*   PROT 4.4*   *   K 4.9   CO2 15*   *   BUN 15   CREATININE 0.6   ALKPHOS 186*   ALT 74*   *   BILITOT 3.6*     No results for input(s): LACTATE in the last 72 hours.  No results for input(s): CPK, CPKMB, TROPONINI, MB in the last 168 hours.  BNP  No results for input(s): BNP, BNPTRIAGEBLO in the last 168 hours.  ABG  No results for input(s): PH, PO2, PCO2, HCO3, BE in the last 168 hours.   Lab Results   Component Value Date    INR 2.0 (H) 05/27/2023    INR 3.0 (H) 05/18/2023    INR 1.7 (H) 05/05/2023       Recent imaging:   MRI Brain Without Contrast  Narrative: EXAMINATION:  MRI BRAIN WITHOUT CONTRAST    CLINICAL HISTORY:  Mental status change, unknown cause; Hepatic encephalopathy    TECHNIQUE:  Multiplanar multisequence MR imaging of the brain was performed without contrast.    COMPARISON:  CT brain 05/18/2023    FINDINGS:  There is no evidence of abnormal restricted diffusion to suggest acute ischemia/infarct.  There is prominence of the ventricles, cisterns and sulci in keeping with senescent atrophic changes.  Confluent deep white matter periventricular high T2 and FLAIR signal compatible with microvascular chronic ischemic changes noted.  There is no focal mass or mass effect.  Posterior fossa structures and pituitary gland grossly appear intact.  Flow voids are noted in the major intracranial cerebral arteries.  Paranasal sinuses are clear.  Orbits grossly appear intact.  Impression: No acute intracranial abnormalities.    Chronic small vessel ischemic changes.    Electronically signed by: Josee Reeves  Date:    05/26/2023  Time:    23:40  X-Ray Chest 1 View  Narrative: EXAMINATION:  XR CHEST 1 VIEW    CLINICAL HISTORY:  NGT placement, possible aspiration; Hepatic  encephalopathy    TECHNIQUE:  Single frontal view of the chest was performed.    COMPARISON:  05/26/2023    FINDINGS:  Interval advancement of esophagogastric tube with side port projecting at the at the stomach (which appears partially gas-filled).  Relative paucity of visualized upper bowel gas.  Radiopaque gallstones project at the right upper quadrant.  Impression: As above.    Electronically signed by: Eriberto Coker  Date:    05/26/2023  Time:    14:57  X-Ray Chest AP Portable  Narrative: EXAMINATION:  XR CHEST AP PORTABLE    CLINICAL HISTORY:  emesis with NG feeds; Hepatic encephalopathy    TECHNIQUE:  Single frontal view of the chest was performed.    COMPARISON:  05/22/2023    FINDINGS:  Nasogastric tube tip projects at the distal esophagus, side hole at the mid esophagus.  The cardiomediastinal silhouette is prominent noting magnification by technique, stable..  There is obscuration of the costophrenic angles suggesting effusions..  The trachea is midline.  The lungs are symmetrically expanded bilaterally with coarse central hilar interstitial attenuation, may reflect edema.  There is bilateral basilar subsegmental atelectasis..  No large focal consolidation seen.  There is no pneumothorax.  The osseous structures are unchanged..  Impression: As above    Electronically signed by: Willis Loomis MD  Date:    05/26/2023  Time:    12:22      Airway:     Vent settings: Oxygen Concentration (%):  [28-36] 28       IV access:        Peripheral IV - Single Lumen 05/21/23 0608 20 G Anterior;Right Forearm (Active)   Site Assessment Clean;Dry;Intact;No redness;No swelling 05/27/23 0755   Extremity Assessment Distal to IV No warmth;No swelling;No redness;No abnormal discoloration 05/27/23 0755   Line Status Saline locked 05/27/23 0755   Dressing Status Clean;Dry;Intact 05/27/23 0755   Dressing Intervention Integrity maintained 05/27/23 0755   Dressing Change Due 05/25/23 05/27/23 0755   Site Change Due 05/25/23  05/27/23 0755   Reason Not Rotated Poor venous access 05/27/23 0755            Midline Catheter Insertion/Assessment  - Single Lumen 05/25/23 1005 Right basilic vein (medial side of arm) other (see comments) (Active)   $ Midline Charges (Upon insertion) Bedside Insertion Performed Pt < 3 Years Old 05/26/23 0900   Site Assessment Clean;Dry;Intact;No redness;No swelling 05/27/23 0755   IV Device Securement catheter securement device 05/27/23 0755   Line Status Saline locked 05/27/23 0755   Dressing Type CHG impregnated dressing/sponge;Transparent (Tegaderm) 05/27/23 0755   Dressing Status Clean;Dry;Intact 05/27/23 0755   Dressing Intervention Integrity maintained 05/27/23 0755   Dressing Change Due 06/01/23 05/27/23 0755   Reason Not Rotated Not due 05/27/23 0755     Allergies:   Review of patient's allergies indicates:   Allergen Reactions    Sulfa (sulfonamide antibiotics) Rash    Codeine Nausea And Vomiting      NPO: Yes    Anticoagulation:   Anticoagulants       Ordered     Route Frequency Start Stop    05/25/23 1035  enoxaparin         SubQ Every 24 hours 05/25/23 1700 --             Instructions      Jimmy Phillip-  Admit to Hospital Medicine  Upon patient arrival to floor, please send SecureChat to [facility] or call extension [phone] (if no answer, do NOT leave a callback number after the beep, rather please send a SecureChat to [facility]), for Hospital Medicine admit team assignment and for additional admit orders for the patient.  Do not page the attending physician associated with the patient on arrival (this physician may not be on duty at the time of arrival).  Rather, always send a SecureChat to [facility] or call [phone] to reach the triage physician for orders and team assignment.

## 2023-05-28 NOTE — ASSESSMENT & PLAN NOTE
Chronic issue  - slightly worse today. No petechiae on exam  - Continue to monitor with daily cbc  - Transfuse for platelets <10k or <50k w/ active bleeding

## 2023-05-29 LAB
ALBUMIN SERPL BCP-MCNC: 1.4 G/DL (ref 3.5–5.2)
ALBUMIN SERPL BCP-MCNC: 1.4 G/DL (ref 3.5–5.2)
ALBUMIN SERPL BCP-MCNC: 1.5 G/DL (ref 3.5–5.2)
ALP SERPL-CCNC: 178 U/L (ref 55–135)
ALT SERPL W/O P-5'-P-CCNC: 67 U/L (ref 10–44)
ANION GAP SERPL CALC-SCNC: 10 MMOL/L (ref 8–16)
ANION GAP SERPL CALC-SCNC: 8 MMOL/L (ref 8–16)
ANION GAP SERPL CALC-SCNC: 9 MMOL/L (ref 8–16)
ANISOCYTOSIS BLD QL SMEAR: SLIGHT
AST SERPL-CCNC: 104 U/L (ref 10–40)
AV INDEX (PROSTH): 0.87
AV MEAN GRADIENT: 5 MMHG
AV PEAK GRADIENT: 8 MMHG
AV VALVE AREA: 2.78 CM2
AV VELOCITY RATIO: 0.77
BASO STIPL BLD QL SMEAR: ABNORMAL
BASOPHILS # BLD AUTO: 0.03 K/UL (ref 0–0.2)
BASOPHILS NFR BLD: 0.2 % (ref 0–1.9)
BILIRUB SERPL-MCNC: 3.1 MG/DL (ref 0.1–1)
BSA FOR ECHO PROCEDURE: 1.62 M2
BUN SERPL-MCNC: 21 MG/DL (ref 6–20)
BUN SERPL-MCNC: 22 MG/DL (ref 6–20)
BUN SERPL-MCNC: 22 MG/DL (ref 6–20)
BURR CELLS BLD QL SMEAR: ABNORMAL
CALCIUM SERPL-MCNC: 8.1 MG/DL (ref 8.7–10.5)
CALCIUM SERPL-MCNC: 8.2 MG/DL (ref 8.7–10.5)
CALCIUM SERPL-MCNC: 8.2 MG/DL (ref 8.7–10.5)
CHLORIDE SERPL-SCNC: 119 MMOL/L (ref 95–110)
CHLORIDE SERPL-SCNC: 120 MMOL/L (ref 95–110)
CHLORIDE SERPL-SCNC: 124 MMOL/L (ref 95–110)
CO2 SERPL-SCNC: 16 MMOL/L (ref 23–29)
CO2 SERPL-SCNC: 19 MMOL/L (ref 23–29)
CO2 SERPL-SCNC: 20 MMOL/L (ref 23–29)
CREAT SERPL-MCNC: 0.8 MG/DL (ref 0.5–1.4)
CREAT SERPL-MCNC: 0.8 MG/DL (ref 0.5–1.4)
CREAT SERPL-MCNC: 0.9 MG/DL (ref 0.5–1.4)
CV ECHO LV RWT: 0.43 CM
DIFFERENTIAL METHOD: ABNORMAL
DOHLE BOD BLD QL SMEAR: PRESENT
DOP CALC AO PEAK VEL: 1.44 M/S
DOP CALC AO VTI: 27.22 CM
DOP CALC LVOT AREA: 3.2 CM2
DOP CALC LVOT DIAMETER: 2.02 CM
DOP CALC LVOT PEAK VEL: 1.11 M/S
DOP CALC LVOT STROKE VOLUME: 75.72 CM3
DOP CALCLVOT PEAK VEL VTI: 23.64 CM
E WAVE DECELERATION TIME: 374.59 MSEC
E/A RATIO: 0.86
E/E' RATIO: 9.18 M/S
ECHO LV POSTERIOR WALL: 0.8 CM (ref 0.6–1.1)
EJECTION FRACTION: 65 %
EOSINOPHIL # BLD AUTO: 0.3 K/UL (ref 0–0.5)
EOSINOPHIL NFR BLD: 1.8 % (ref 0–8)
ERYTHROCYTE [DISTWIDTH] IN BLOOD BY AUTOMATED COUNT: 21.9 % (ref 11.5–14.5)
EST. GFR  (NO RACE VARIABLE): >60 ML/MIN/1.73 M^2
ESTIMATED AVG GLUCOSE: ABNORMAL MG/DL (ref 68–131)
FOLATE SERPL-MCNC: 13.5 NG/ML (ref 4–24)
FRACTIONAL SHORTENING: 39 % (ref 28–44)
GLUCOSE SERPL-MCNC: 153 MG/DL (ref 70–110)
GLUCOSE SERPL-MCNC: 179 MG/DL (ref 70–110)
GLUCOSE SERPL-MCNC: 211 MG/DL (ref 70–110)
HBA1C MFR BLD: <4 % (ref 4–5.6)
HCT VFR BLD AUTO: 30.6 % (ref 37–48.5)
HGB BLD-MCNC: 9.1 G/DL (ref 12–16)
IMM GRANULOCYTES # BLD AUTO: 0.08 K/UL (ref 0–0.04)
IMM GRANULOCYTES NFR BLD AUTO: 0.6 % (ref 0–0.5)
INR PPP: 1.9 (ref 0.8–1.2)
INTERVENTRICULAR SEPTUM: 0.8 CM (ref 0.6–1.1)
LA MAJOR: 3.69 CM
LA MINOR: 3.88 CM
LA WIDTH: 3.08 CM
LEFT ATRIUM SIZE: 3.21 CM
LEFT ATRIUM VOLUME INDEX: 19.9 ML/M2
LEFT ATRIUM VOLUME: 31.79 CM3
LEFT INTERNAL DIMENSION IN SYSTOLE: 2.24 CM (ref 2.1–4)
LEFT VENTRICLE DIASTOLIC VOLUME INDEX: 26.33 ML/M2
LEFT VENTRICLE DIASTOLIC VOLUME: 42.13 ML
LEFT VENTRICLE MASS INDEX: 51 G/M2
LEFT VENTRICLE SYSTOLIC VOLUME INDEX: 10.6 ML/M2
LEFT VENTRICLE SYSTOLIC VOLUME: 16.9 ML
LEFT VENTRICULAR INTERNAL DIMENSION IN DIASTOLE: 3.7 CM (ref 3.5–6)
LEFT VENTRICULAR MASS: 82.32 G
LV LATERAL E/E' RATIO: 8.67 M/S
LV SEPTAL E/E' RATIO: 9.75 M/S
LYMPHOCYTES # BLD AUTO: 1.8 K/UL (ref 1–4.8)
LYMPHOCYTES NFR BLD: 12.9 % (ref 18–48)
MAGNESIUM SERPL-MCNC: 1.9 MG/DL (ref 1.6–2.6)
MCH RBC QN AUTO: 33.6 PG (ref 27–31)
MCHC RBC AUTO-ENTMCNC: 29.7 G/DL (ref 32–36)
MCV RBC AUTO: 113 FL (ref 82–98)
MONOCYTES # BLD AUTO: 1.3 K/UL (ref 0.3–1)
MONOCYTES NFR BLD: 9 % (ref 4–15)
MV PEAK A VEL: 0.91 M/S
MV PEAK E VEL: 0.78 M/S
MV STENOSIS PRESSURE HALF TIME: 108.63 MS
MV VALVE AREA P 1/2 METHOD: 2.03 CM2
NEUTROPHILS # BLD AUTO: 10.8 K/UL (ref 1.8–7.7)
NEUTROPHILS NFR BLD: 75.5 % (ref 38–73)
NRBC BLD-RTO: 1 /100 WBC
OVALOCYTES BLD QL SMEAR: ABNORMAL
PHOSPHATE SERPL-MCNC: 2.1 MG/DL (ref 2.7–4.5)
PHOSPHATE SERPL-MCNC: 2.3 MG/DL (ref 2.7–4.5)
PHOSPHATE SERPL-MCNC: 2.3 MG/DL (ref 2.7–4.5)
PLATELET # BLD AUTO: 71 K/UL (ref 150–450)
PLATELET BLD QL SMEAR: ABNORMAL
PMV BLD AUTO: 10.1 FL (ref 9.2–12.9)
POCT GLUCOSE: 146 MG/DL (ref 70–110)
POCT GLUCOSE: 188 MG/DL (ref 70–110)
POCT GLUCOSE: 189 MG/DL (ref 70–110)
POCT GLUCOSE: 203 MG/DL (ref 70–110)
POCT GLUCOSE: 273 MG/DL (ref 70–110)
POIKILOCYTOSIS BLD QL SMEAR: SLIGHT
POLYCHROMASIA BLD QL SMEAR: ABNORMAL
POTASSIUM SERPL-SCNC: 3 MMOL/L (ref 3.5–5.1)
POTASSIUM SERPL-SCNC: 3.4 MMOL/L (ref 3.5–5.1)
POTASSIUM SERPL-SCNC: 3.5 MMOL/L (ref 3.5–5.1)
PROT SERPL-MCNC: 4.3 G/DL (ref 6–8.4)
PROTHROMBIN TIME: 19.3 SEC (ref 9–12.5)
RA MAJOR: 3.92 CM
RA PRESSURE: 3 MMHG
RA WIDTH: 2.46 CM
RBC # BLD AUTO: 2.71 M/UL (ref 4–5.4)
RIGHT VENTRICULAR END-DIASTOLIC DIMENSION: 3.09 CM
SINUS: 3.2 CM
SODIUM SERPL-SCNC: 148 MMOL/L (ref 136–145)
SODIUM SERPL-SCNC: 148 MMOL/L (ref 136–145)
SODIUM SERPL-SCNC: 149 MMOL/L (ref 136–145)
STJ: 2.7 CM
TDI LATERAL: 0.09 M/S
TDI SEPTAL: 0.08 M/S
TDI: 0.09 M/S
TOXIC GRANULES BLD QL SMEAR: PRESENT
TRICUSPID ANNULAR PLANE SYSTOLIC EXCURSION: 1.73 CM
VIT B12 SERPL-MCNC: >2000 PG/ML (ref 210–950)
WBC # BLD AUTO: 14.29 K/UL (ref 3.9–12.7)

## 2023-05-29 PROCEDURE — 99223 PR INITIAL HOSPITAL CARE,LEVL III: ICD-10-PCS | Mod: GC,,, | Performed by: INTERNAL MEDICINE

## 2023-05-29 PROCEDURE — 99233 PR SUBSEQUENT HOSPITAL CARE,LEVL III: ICD-10-PCS | Mod: ,,, | Performed by: STUDENT IN AN ORGANIZED HEALTH CARE EDUCATION/TRAINING PROGRAM

## 2023-05-29 PROCEDURE — 80069 RENAL FUNCTION PANEL: CPT | Performed by: STUDENT IN AN ORGANIZED HEALTH CARE EDUCATION/TRAINING PROGRAM

## 2023-05-29 PROCEDURE — 83036 HEMOGLOBIN GLYCOSYLATED A1C: CPT | Performed by: STUDENT IN AN ORGANIZED HEALTH CARE EDUCATION/TRAINING PROGRAM

## 2023-05-29 PROCEDURE — 99233 SBSQ HOSP IP/OBS HIGH 50: CPT | Mod: GC,,, | Performed by: PSYCHIATRY & NEUROLOGY

## 2023-05-29 PROCEDURE — 85610 PROTHROMBIN TIME: CPT | Performed by: STUDENT IN AN ORGANIZED HEALTH CARE EDUCATION/TRAINING PROGRAM

## 2023-05-29 PROCEDURE — 92521 EVALUATION OF SPEECH FLUENCY: CPT

## 2023-05-29 PROCEDURE — 84100 ASSAY OF PHOSPHORUS: CPT | Performed by: STUDENT IN AN ORGANIZED HEALTH CARE EDUCATION/TRAINING PROGRAM

## 2023-05-29 PROCEDURE — 99233 PR SUBSEQUENT HOSPITAL CARE,LEVL III: ICD-10-PCS | Mod: GC,,, | Performed by: PSYCHIATRY & NEUROLOGY

## 2023-05-29 PROCEDURE — 92610 EVALUATE SWALLOWING FUNCTION: CPT

## 2023-05-29 PROCEDURE — 36415 COLL VENOUS BLD VENIPUNCTURE: CPT | Performed by: STUDENT IN AN ORGANIZED HEALTH CARE EDUCATION/TRAINING PROGRAM

## 2023-05-29 PROCEDURE — 83735 ASSAY OF MAGNESIUM: CPT | Performed by: STUDENT IN AN ORGANIZED HEALTH CARE EDUCATION/TRAINING PROGRAM

## 2023-05-29 PROCEDURE — 63600175 PHARM REV CODE 636 W HCPCS: Performed by: HOSPITALIST

## 2023-05-29 PROCEDURE — 99233 SBSQ HOSP IP/OBS HIGH 50: CPT | Mod: ,,, | Performed by: STUDENT IN AN ORGANIZED HEALTH CARE EDUCATION/TRAINING PROGRAM

## 2023-05-29 PROCEDURE — 20600001 HC STEP DOWN PRIVATE ROOM

## 2023-05-29 PROCEDURE — 99223 1ST HOSP IP/OBS HIGH 75: CPT | Mod: GC,,, | Performed by: INTERNAL MEDICINE

## 2023-05-29 PROCEDURE — 85025 COMPLETE CBC W/AUTO DIFF WBC: CPT | Performed by: STUDENT IN AN ORGANIZED HEALTH CARE EDUCATION/TRAINING PROGRAM

## 2023-05-29 PROCEDURE — 82746 ASSAY OF FOLIC ACID SERUM: CPT | Performed by: STUDENT IN AN ORGANIZED HEALTH CARE EDUCATION/TRAINING PROGRAM

## 2023-05-29 PROCEDURE — 63600175 PHARM REV CODE 636 W HCPCS: Performed by: STUDENT IN AN ORGANIZED HEALTH CARE EDUCATION/TRAINING PROGRAM

## 2023-05-29 PROCEDURE — 25000003 PHARM REV CODE 250: Performed by: STUDENT IN AN ORGANIZED HEALTH CARE EDUCATION/TRAINING PROGRAM

## 2023-05-29 PROCEDURE — 80053 COMPREHEN METABOLIC PANEL: CPT | Performed by: STUDENT IN AN ORGANIZED HEALTH CARE EDUCATION/TRAINING PROGRAM

## 2023-05-29 PROCEDURE — 97535 SELF CARE MNGMENT TRAINING: CPT

## 2023-05-29 PROCEDURE — 82607 VITAMIN B-12: CPT | Performed by: STUDENT IN AN ORGANIZED HEALTH CARE EDUCATION/TRAINING PROGRAM

## 2023-05-29 RX ORDER — FUROSEMIDE 10 MG/ML
40 INJECTION INTRAMUSCULAR; INTRAVENOUS ONCE
Status: COMPLETED | OUTPATIENT
Start: 2023-05-29 | End: 2023-05-29

## 2023-05-29 RX ORDER — LEVETIRACETAM 1000 MG/1
1000 TABLET ORAL 2 TIMES DAILY
Status: ON HOLD | COMMUNITY
End: 2023-08-01 | Stop reason: HOSPADM

## 2023-05-29 RX ORDER — ARIPIPRAZOLE 5 MG/1
5 TABLET ORAL DAILY
Status: DISCONTINUED | OUTPATIENT
Start: 2023-05-29 | End: 2023-06-02

## 2023-05-29 RX ORDER — HYDROXYZINE HYDROCHLORIDE 50 MG/1
50 TABLET, FILM COATED ORAL NIGHTLY
Status: ON HOLD | COMMUNITY
End: 2023-06-30 | Stop reason: SDUPTHER

## 2023-05-29 RX ORDER — LEVETIRACETAM 500 MG/1
1000 TABLET ORAL 2 TIMES DAILY
Status: DISCONTINUED | OUTPATIENT
Start: 2023-05-29 | End: 2023-05-30

## 2023-05-29 RX ORDER — FUROSEMIDE 10 MG/ML
20 INJECTION INTRAMUSCULAR; INTRAVENOUS ONCE
Status: COMPLETED | OUTPATIENT
Start: 2023-05-29 | End: 2023-05-29

## 2023-05-29 RX ADMIN — RIFAXIMIN 550 MG: 550 TABLET ORAL at 09:05

## 2023-05-29 RX ADMIN — FUROSEMIDE 20 MG: 10 INJECTION, SOLUTION INTRAMUSCULAR; INTRAVENOUS at 09:05

## 2023-05-29 RX ADMIN — INSULIN ASPART 2 UNITS: 100 INJECTION, SOLUTION INTRAVENOUS; SUBCUTANEOUS at 01:05

## 2023-05-29 RX ADMIN — SENNOSIDES AND DOCUSATE SODIUM 1 TABLET: 50; 8.6 TABLET ORAL at 09:05

## 2023-05-29 RX ADMIN — POTASSIUM BICARBONATE 50 MEQ: 978 TABLET, EFFERVESCENT ORAL at 06:05

## 2023-05-29 RX ADMIN — ZONISAMIDE 100 MG: 100 CAPSULE ORAL at 09:05

## 2023-05-29 RX ADMIN — LACTULOSE 30 G: 20 SOLUTION ORAL at 03:05

## 2023-05-29 RX ADMIN — FUROSEMIDE 40 MG: 10 INJECTION, SOLUTION INTRAMUSCULAR; INTRAVENOUS at 03:05

## 2023-05-29 RX ADMIN — LACTULOSE 30 G: 20 SOLUTION ORAL at 09:05

## 2023-05-29 RX ADMIN — SODIUM BICARBONATE 1300 MG: 650 TABLET ORAL at 09:05

## 2023-05-29 RX ADMIN — ARIPIPRAZOLE 5 MG: 5 TABLET ORAL at 10:05

## 2023-05-29 RX ADMIN — FOLIC ACID 1 MG: 1 TABLET ORAL at 09:05

## 2023-05-29 RX ADMIN — POTASSIUM BICARBONATE 50 MEQ: 978 TABLET, EFFERVESCENT ORAL at 09:05

## 2023-05-29 RX ADMIN — LEVETIRACETAM 1000 MG: 500 SOLUTION ORAL at 09:05

## 2023-05-29 RX ADMIN — PHYTONADIONE 10 MG: 10 INJECTION, EMULSION INTRAMUSCULAR; INTRAVENOUS; SUBCUTANEOUS at 09:05

## 2023-05-29 RX ADMIN — LEVETIRACETAM 1000 MG: 500 TABLET, FILM COATED ORAL at 09:05

## 2023-05-29 RX ADMIN — DEXTROSE MONOHYDRATE: 50 INJECTION, SOLUTION INTRAVENOUS at 07:05

## 2023-05-29 NOTE — PT/OT/SLP EVAL
Speech Language Pathology Evaluation  Bedside Swallow  Discharge    Patient Name:  Marely Hamilton   MRN:  [id number]  [id number]    Admitting Diagnosis: Acute encephalopathy    Recommendations:                 General Recommendations:  Follow-up not indicated  Diet recommendations:  Dental Soft, Thin   *patient reporting preference for a soft diet at this time. Okay to upgrade to regular solids upon request  Aspiration Precautions: Standard aspiration precautions, assistance setting up meal tray  General Precautions: Standard, fall, aspiration  Communication strategies:  none and yes/no questions only (no vocalizations with SLP)    Assessment:     Marely Hamilton is a 58 y.o. female with adequate tolerance of po trials and no overt s/s of aspiration. No further skilled acute Speech Therapy services warranted at this time for swallowing. Please re-consult as needed.       History:     Past Medical History:   Diagnosis Date    Addiction to drug     Alcohol abuse     Alcohol abuse, in remission 6/15/2015    14.5 weeks ago; AA weekly    Anemia     Anxiety 6/15/2015    Behavioral problem     Bipolar disorder     Bipolar disorder in remission 6/15/2015    Cirrhosis, Laennec's 6/15/2015    Depression     Encounter for blood transfusion     Epistaxis 6/15/2015    Fatigue     Glaucoma     Hematuria     Hepatic encephalopathy 6/15/2015    Hepatic enlargement     History of psychiatric care     History of psychiatric hospitalization     History of seizure 6/15/2015    1    hx of intentional Tylenol overdose 6/15/2015    2005- situational and hx of bipolar    Hx of psychiatric care     Macrocytic anemia 9/18/2015    6 units PRBC since June 2015    Jeana     Osteoarthritis     Other ascites 6/15/2015    Psychiatric exam requested by authority     Psychiatric problem     Psychosis 9/26/2019    Renal disorder     Seizures     Self-harming behavior     Suicide attempt     Therapy     Thrombocytopenia 6/15/2015       Past Surgical  History:   Procedure Laterality Date    COSMETIC SURGERY      ESOPHAGOGASTRODUODENOSCOPY         MD note 5/28: HPI: Patient is a 58 y.o.female w/ PMHx of EtOH induced cirrhosis, seizure on AEDs, and bipolar disorder on lithium who initially presented to [facility] on 5/18/2023 with a primary complaint of altered mental status suspected to be from hepatic encephalopathy versus hypercalcemia who despite correction of her calcium and treatment with lactulose and rifaximin continued to be encephalopathic.  Given patient's persistent encephalopathy, there was a concern that she had uncontrolled hepatic encephalopathy and was transferred to [facility] for further evaluation.  During patient's hospitalization at [facility], she was noted to be hypercalcemic likely secondary to her lithium use for her bipolar disorder.  She was started on aripiprazole at that time.  Given her inability to follow commands, she had an NG-tube placed for her to safely accept medicines.  Upon arrival here, her NG tube was essentially dislodged and subsequently removed.  On bedside swallow assessment, patient was able to follow commands and swallow safely.  Additionally, on arrival patient had a small bowel movement.  When asked where she was, remained silent.  She later shared unprompted that she has a history of glaucoma.  Patient later stated her full name.  She was able to follow simple commands, but not 2 step commands.       Chest X-Rays: Interval removal of enteric tube.  Mediastinal structures are midline.  Grossly stable cardiomediastinal silhouette.  Cardiac monitoring leads overlie the chest.  Bilateral patchy perihilar opacities and coarsened interstitial attenuation.  Additional patchy opacities at bilateral lung bases and mild blunting of costophrenic angles, likely represents subsegmental atelectasis and small volume pleural fluid.  No pneumothorax.    Prior diet: ST recommendations for a puree diet  and thin liquids at [facility] prior to decreased TRISTAN, poor participation, and transfer to [facility].       Subjective     Patient awake and cooperative. No vocalizations. She answered 1 y/n questions when SLP questioned if she would like to remain on a soft diet, she nodded her head 'yes.' She did not answer further ST questions, however, adequate TRISTAN maintained.     Objective:     Oral Musculature Evaluation  Oral Musculature: unable to assess due to poor participation/comprehension  Dentition: present and adequate  Mucosal Quality: adequate    Bedside Swallow Eval:   Consistencies Assessed:  Thin liquids sips of water via straw (6 ounces)  Puree bites of apple sauce  Solids bites of altagracia cracker      Oral Phase:   Slow oral transit time  Adequate oral clearance    Pharyngeal Phase:   no overt clinical signs/symptoms of aspiration  no overt clinical signs/symptoms of pharyngeal dysphagia    Compensatory Strategies  None    Treatment: SLP provided patient education on SLP recommendations, SLP role, s/s and risks of aspiration, safe swallow precautions, and POC. She did not demonstrate or verbalize understanding. She answered 1 y/n questions when SLP questioned if she would like to remain on a soft diet, she nodded her head 'yes.' She did not answer further ST questions, however, adequate TRISTAN maintained. SLP provided oral care with toothbrush, toothpaste, oral swabs, and suctioning. White board updated.     Goals:   Multidisciplinary Problems       SLP Goals       Not on file              Multidisciplinary Problems (Resolved)          Problem: SLP    Goal Priority Disciplines Outcome   SLP Goal   (Resolved)     SLP Met                       Plan:       Plan of Care reviewed with:  patient   SLP Follow-Up:  No       Discharge recommendations:   (no ST needs for swallowing)   Barriers to Discharge:  None    Time Tracking:     SLP Treatment Date:   05/29/23  Speech Start Time:  0833  Speech Stop Time:  0850      Speech Total Time (min):  17 min    Billable Minutes: Eval Swallow and Oral Function 9 and Self Care/Home Management Training 8    05/29/2023

## 2023-05-29 NOTE — ASSESSMENT & PLAN NOTE
Patient presents as an outside hospital transfer for persistent encephalopathy.  She is been treated for her hypercalcemia as well as hepatic encephalopathy and continues to remain encephalopathic with concerns for persistent encephalopathy from a hepatic source.  While at the outside hospital, she was taken off of lithium and transitioned to aripiprazole for her bipolar disorder.  She had an EEG performed which showed diffuse slowing consistent with encephalopathy, however no epileptiform discharges.  Patient has a seizure history for which she was on Keppra and zonisamide.  On evaluation at [facility], patient able to follow one-step commands, oriented to self but not situation or time, no clonus in the lower extremities, but patient has fine motor tremors. MRI imaging at [facility] unremarkable.  - Continue Keppra 1g q12h and zonisamide 100mg q12  - Continue lactulose (increased to 30g TID) and rifaximin  - q4h neuro checks  - Continue aripiprazole  - Lithium low  - Check PETH level

## 2023-05-29 NOTE — HPI
Ms. Marely Hamilton is a 58 year old female for whom hepatology is consulted for management of cirrhosis. She has a PMH significant for ETOH cirrhosis (associated with ascites and encephalopathy; declined for transplant here in 12/2015 due to malnutrition), and bipolar disorder. History mostly limited to chart review due to patient being encephalopathic.     Hospital Course:   Patient was admitted to [facility] on 05/18/2023 for management of decompensated cirrhosis in the setting of encephalopathy of one day duration. Her presentation was notable for labs showing leukocytosis (13), macrocytic anemia (Hgb 11.5), elevated INR (3), hypernatremia (153), AYESHA, hypercalcemia (11.5), transaminitis (AST 48, ALT 23), and hyperbilirubinemia (4.8). Lithium level subtherapeutic. UDS negative and serum ETOH undetectable. UA consistent with UTI with urine cultures growing Proteus. CT head unremarkable. She was initiated on Ceftriaxone, Lactulose, and IVF for attempted improvement in hypercalcemia without improvement in encephalopathy. EEG negative for seizures and MRI brain on 05/26 only significant for chronic ischemic changes. Due to persistent encephalopathy she was transferred to [facility] on 05/28 for hepatology evaluation.     On initial bedside interview, patient was alert to name, but did not answer direct questions or nod head when asked yes/no questions.

## 2023-05-29 NOTE — CARE UPDATE
RAPID RESPONSE NURSE ROUND       Rounding completed with charge RN, Johana for hypothermia, hypotension, AMS reports patient responsive but delayed. Warming blanket ordered. No additional concerns verbalized at this time. Instructed to call [phone] for further concerns or assistance.

## 2023-05-29 NOTE — SUBJECTIVE & OBJECTIVE
Review of Systems   Unable to perform ROS: Mental status change     Past Medical History:   Diagnosis Date    Addiction to drug     Alcohol abuse     Alcohol abuse, in remission 6/15/2015    14.5 weeks ago; AA weekly    Anemia     Anxiety 6/15/2015    Behavioral problem     Bipolar disorder     Bipolar disorder in remission 6/15/2015    Cirrhosis, Laennec's 6/15/2015    Depression     Encounter for blood transfusion     Epistaxis 6/15/2015    Fatigue     Glaucoma     Hematuria     Hepatic encephalopathy 6/15/2015    Hepatic enlargement     History of psychiatric care     History of psychiatric hospitalization     History of seizure 6/15/2015    1    hx of intentional Tylenol overdose 6/15/2015    2005- situational and hx of bipolar    Hx of psychiatric care     Macrocytic anemia 9/18/2015    6 units PRBC since June 2015    Jeana     Osteoarthritis     Other ascites 6/15/2015    Psychiatric exam requested by authority     Psychiatric problem     Psychosis 9/26/2019    Renal disorder     Seizures     Self-harming behavior     Suicide attempt     Therapy     Thrombocytopenia 6/15/2015       Past Surgical History:   Procedure Laterality Date    COSMETIC SURGERY      ESOPHAGOGASTRODUODENOSCOPY       Review of patient's allergies indicates:   Allergen Reactions    Sulfa (sulfonamide antibiotics) Rash    Codeine Nausea And Vomiting         Tobacco Use    Smoking status: Never    Smokeless tobacco: Never   Substance and Sexual Activity    Alcohol use: Not Currently     Comment: hx of ETOH abuse with cirrhosis of liver    Drug use: No    Sexual activity: Not Currently       Medications Prior to Admission   Medication Sig Dispense Refill Last Dose    ARIPiprazole (ABILIFY) 5 MG Tab Take 1 tablet (5 mg total) by mouth once daily. 30 tablet 11 Unknown    folic acid (FOLVITE) 1 MG tablet Take 1 tablet (1 mg total) by mouth once daily. 30 tablet 2 Unknown    lactulose (CHRONULAC) 20 gram/30 mL Soln 45 mLs (30 g total) by Per NG  tube route 2 (two) times daily.   Unknown    levetiracetam 500 mg/5 mL (5 mL) Soln 10 mLs (1,000 mg total) by Per NG tube route 2 (two) times daily. 600 mL 11 Unknown    propranoloL (INDERAL) 20 MG tablet Take 20 mg by mouth once daily.   Unknown    rifAXIMin (XIFAXAN) 550 mg Tab Take 1 tablet (550 mg total) by mouth 2 (two) times daily. 60 tablet 2 Unknown    zonisamide (ZONEGRAN) 100 MG Cap Take 100 mg by mouth once daily.   Unknown       Objective:     Vital Signs (Most Recent):  Temp: 97.5 °F (36.4 °C) (05/29/23 0546)  Pulse: 77 (05/29/23 0546)  Resp: 18 (05/29/23 0546)  BP: 103/60 (05/29/23 0546)  SpO2: 96 % (05/29/23 0546) Vital Signs (24h Range):  Temp:  [94.9 °F (34.9 °C)-98.9 °F (37.2 °C)] 97.5 °F (36.4 °C)  Pulse:  [67-83] 77  Resp:  [16-28] 18  SpO2:  [92 %-96 %] 96 %  BP: ()/(51-70) 103/60     Weight: 60.1 kg (132 lb 7.9 oz) (05/28/23 1230)  Body mass index is 24.23 kg/m².       Physical Exam  Constitutional:       Appearance: She is ill-appearing.   Eyes:      General: Scleral icterus present.   Cardiovascular:      Rate and Rhythm: Normal rate and regular rhythm.      Pulses: Normal pulses.      Heart sounds: Normal heart sounds.   Pulmonary:      Effort: Pulmonary effort is normal. No respiratory distress.      Breath sounds: Normal breath sounds.   Abdominal:      General: Bowel sounds are normal. There is no distension.      Palpations: Abdomen is soft.      Tenderness: There is no abdominal tenderness.   Musculoskeletal:      Right lower leg: No edema.      Left lower leg: No edema.   Skin:     General: Skin is warm and dry.      Coloration: Skin is jaundiced.   Neurological:      Mental Status: She is alert.      GCS: GCS eye subscore is 3. GCS verbal subscore is 1. GCS motor subscore is 5.          MELD-Na: 18 at 5/29/2023  6:56 AM  MELD: 18 at 5/29/2023  6:56 AM  Calculated from:  Serum Creatinine: 0.9 mg/dL (Using min of 1 mg/dL) at 5/29/2023  6:01 AM  Serum Sodium: 148 mmol/L (Using max  of 137 mmol/L) at 5/29/2023  6:01 AM  Total Bilirubin: 3.1 mg/dL at 5/29/2023  6:01 AM  INR(ratio): 1.9 at 5/29/2023  6:56 AM      Significant Labs:  Labs within the past month have been reviewed.    Significant Imaging:  CT: Reviewed  MRI: Reviewed

## 2023-05-29 NOTE — CONSULTS
[facility] - Transplant Stepdown  Hepatology  Consult Note    Patient Name: Marely Hamilton  MRN: [id number]  Admission Date: 5/28/2023  Hospital Length of Stay: 1 days  Attending Provider: Derick Winston MD   Primary Care Physician: Viktor Ross MD  Principal Problem:Acute encephalopathy    Inpatient consult to Hepatology  Consult performed by: Sarbjit Hollins MD  Consult ordered by: Derick Winston MD        Subjective:     HPI:  Ms. Marely Hamilton is a 58 year old female for whom hepatology is consulted for management of cirrhosis. She has a PMH significant for ETOH cirrhosis (associated with ascites and encephalopathy; declined for transplant here in 12/2015 due to malnutrition), and bipolar disorder. History mostly limited to chart review due to patient being encephalopathic.     Hospital Course:   Patient was admitted to [facility] on 05/18/2023 for management of decompensated cirrhosis in the setting of encephalopathy of one day duration. Her presentation was notable for labs showing leukocytosis (13), macrocytic anemia (Hgb 11.5), elevated INR (3), hypernatremia (153), AYESHA, hypercalcemia (11.5), transaminitis (AST 48, ALT 23), and hyperbilirubinemia (4.8). Lithium level subtherapeutic. UDS negative and serum ETOH undetectable. UA consistent with UTI with urine cultures growing Proteus. CT head unremarkable. She was initiated on Ceftriaxone, Lactulose, and IVF for attempted improvement in hypercalcemia without improvement in encephalopathy. EEG negative for seizures and MRI brain on 05/26 only significant for chronic ischemic changes. Due to persistent encephalopathy she was transferred to [facility] on 05/28 for hepatology evaluation.     On initial bedside interview, patient was alert to name, but did not answer direct questions or nod head when asked yes/no questions.       Review of Systems   Unable to perform ROS: Mental status change     Past Medical History:   Diagnosis Date    Addiction to  drug     Alcohol abuse     Alcohol abuse, in remission 6/15/2015    14.5 weeks ago; AA weekly    Anemia     Anxiety 6/15/2015    Behavioral problem     Bipolar disorder     Bipolar disorder in remission 6/15/2015    Cirrhosis, Laennec's 6/15/2015    Depression     Encounter for blood transfusion     Epistaxis 6/15/2015    Fatigue     Glaucoma     Hematuria     Hepatic encephalopathy 6/15/2015    Hepatic enlargement     History of psychiatric care     History of psychiatric hospitalization     History of seizure 6/15/2015    1    hx of intentional Tylenol overdose 6/15/2015    2005- situational and hx of bipolar    Hx of psychiatric care     Macrocytic anemia 9/18/2015    6 units PRBC since June 2015    Jeana     Osteoarthritis     Other ascites 6/15/2015    Psychiatric exam requested by authority     Psychiatric problem     Psychosis 9/26/2019    Renal disorder     Seizures     Self-harming behavior     Suicide attempt     Therapy     Thrombocytopenia 6/15/2015       Past Surgical History:   Procedure Laterality Date    COSMETIC SURGERY      ESOPHAGOGASTRODUODENOSCOPY       Review of patient's allergies indicates:   Allergen Reactions    Sulfa (sulfonamide antibiotics) Rash    Codeine Nausea And Vomiting         Tobacco Use    Smoking status: Never    Smokeless tobacco: Never   Substance and Sexual Activity    Alcohol use: Not Currently     Comment: hx of ETOH abuse with cirrhosis of liver    Drug use: No    Sexual activity: Not Currently       Medications Prior to Admission   Medication Sig Dispense Refill Last Dose    ARIPiprazole (ABILIFY) 5 MG Tab Take 1 tablet (5 mg total) by mouth once daily. 30 tablet 11 Unknown    folic acid (FOLVITE) 1 MG tablet Take 1 tablet (1 mg total) by mouth once daily. 30 tablet 2 Unknown    lactulose (CHRONULAC) 20 gram/30 mL Soln 45 mLs (30 g total) by Per NG tube route 2 (two) times daily.   Unknown    levetiracetam 500 mg/5 mL (5 mL)  Soln 10 mLs (1,000 mg total) by Per NG tube route 2 (two) times daily. 600 mL 11 Unknown    propranoloL (INDERAL) 20 MG tablet Take 20 mg by mouth once daily.   Unknown    rifAXIMin (XIFAXAN) 550 mg Tab Take 1 tablet (550 mg total) by mouth 2 (two) times daily. 60 tablet 2 Unknown    zonisamide (ZONEGRAN) 100 MG Cap Take 100 mg by mouth once daily.   Unknown       Objective:     Vital Signs (Most Recent):  Temp: 97.5 °F (36.4 °C) (05/29/23 0546)  Pulse: 77 (05/29/23 0546)  Resp: 18 (05/29/23 0546)  BP: 103/60 (05/29/23 0546)  SpO2: 96 % (05/29/23 0546) Vital Signs (24h Range):  Temp:  [94.9 °F (34.9 °C)-98.9 °F (37.2 °C)] 97.5 °F (36.4 °C)  Pulse:  [67-83] 77  Resp:  [16-28] 18  SpO2:  [92 %-96 %] 96 %  BP: ()/(51-70) 103/60     Weight: 60.1 kg (132 lb 7.9 oz) (05/28/23 1230)  Body mass index is 24.23 kg/m².       Physical Exam  Constitutional:       Appearance: She is ill-appearing.   Eyes:      General: Scleral icterus present.   Cardiovascular:      Rate and Rhythm: Normal rate and regular rhythm.      Pulses: Normal pulses.      Heart sounds: Normal heart sounds.   Pulmonary:      Effort: Pulmonary effort is normal. No respiratory distress.      Breath sounds: Normal breath sounds.   Abdominal:      General: Bowel sounds are normal. There is no distension.      Palpations: Abdomen is soft.      Tenderness: There is no abdominal tenderness.   Musculoskeletal:      Right lower leg: No edema.      Left lower leg: No edema.   Skin:     General: Skin is warm and dry.      Coloration: Skin is jaundiced.   Neurological:      Mental Status: She is alert.      GCS: GCS eye subscore is 3. GCS verbal subscore is 1. GCS motor subscore is 5.          MELD-Na: 18 at 5/29/2023  6:56 AM  MELD: 18 at 5/29/2023  6:56 AM  Calculated from:  Serum Creatinine: 0.9 mg/dL (Using min of 1 mg/dL) at 5/29/2023  6:01 AM  Serum Sodium: 148 mmol/L (Using max of 137 mmol/L) at 5/29/2023  6:01 AM  Total Bilirubin: 3.1 mg/dL at  5/29/2023  6:01 AM  INR(ratio): 1.9 at 5/29/2023  6:56 AM      Significant Labs:  Labs within the past month have been reviewed.    Significant Imaging:  CT: Reviewed  MRI: Reviewed    Assessment/Plan:     GI  Hepatic cirrhosis  This is a 58 year old female with PMH significant for ETOH cirrhosis (associated with ascites and encephalopathy; declined for transplant here in 12/2015 due to malnutrition), bipolar disorder (on Lithium), and unspecified seizure disorder (on AEDs) who was admitted to [facility] on 05/28 as a transfer from [facility] for management of hepatic encephalopathy following initial admission on 05/18 for acute onset of encephalopathy associated with UTI secondary to Proteus, hypernatremia, and hypercalcemia. She is status post treatment for UTI and electrolyte derangements without improvement in encephalopathy; CT and MRI brain unremarkable and EEG negative for seizures.     Recommendations:     -Lactulose TID and Rifaximin BID; titrate Lactulose to 3-4 bowel movements daily. If not deemed safe for PO intake, then use Lactulose enemas.   -Okay to hold home diuretics without hypervolemia noted on exam.   -Follow-up repeat PETH.   -Minimize use of medications that may precipitate encephalopathy (e.g. opioids and benzos).   -CMP and INR daily.   -Despite MELD score of 20, no plans for re-initiation of transplant evaluation given encephalopathy limiting ability to confirm social history with patient and ETOH cessation.         Thank you for your consult. I will follow-up with patient. Please contact us if you have any additional questions.    Sarbjit Hollins MD  Hepatology  [facility] - Transplant Stepdown

## 2023-05-29 NOTE — PLAN OF CARE
CM to patient room patient unable to assist with discharge planning CM placed call to Mother ( last week )call placed to sister Zaria Hamilton    CM WCTM

## 2023-05-29 NOTE — CONSULTS
"CONSULTATION LIAISON PSYCHIATRY INITIAL EVALUATION    Patient Name: Marely Hamilton  MRN: [id number]  Patient Class: IP- Inpatient  Admission Date: 5/28/2023  Attending Physician: Derick Winston MD      HPI:   Marely Hamilton is a 58 y.o. female with past psychiatric history of bipolar disorder, alcohol use disorder & past pertinent medical history of seizure disorder (on AEDs), ETOH cirrhosis presents to the ED/admitted to the hospital for Acute encephalopathy    Psychiatry consulted for hx of bipolar on lithium complicated by hypercalcemia at OSH, switched to abilify. Concern for persistent encephalopathy, unclear if psych in origin vs underlying HE exacerbating it"    On psych exam, patient resting in bed with eyes closed. Opens eyes to name but no spontaneous speech. Does not answer questions regarding orientation, mood, anxiety, psychosis. Nods head yes when asked if we can reach out to primary psychiatrist. On re-evaluation with staff psychiatrist, no spontaneous speech. Eyes open only briefly. Does not follow commands or simple direction (open eyes, move specific arm, etc). Interview terminated due to limitation from encephalopathy.     Patient was seen by Dr. Alfredito Aguayo on 5/25/2023 @ [facility]. Medication changes included stopping lithium secondary to hypercalcemia, Holding Seroquel and Zyprexa due to over-sedation. Started on Abilify 5mg. At that time patient oriented to person and state. CAM-ICU positive for delirium.Limited eye contact and only intermittently following verbal commands.     Medical Review of Systems:  Unable to assess secondary to poor engagement during assessment    Psychiatric Review of Systems (is patient experiencing or having changes in):  sleep: JAMIL  appetite: JAMIL  weight: JAMIL  energy/anergy: JAMIL  interest/pleasure/anhedonia: JAMIL  somatic symptoms: JAMIL  libido: JAMIL  anxiety/panic: JAMIL  guilty/hopelessness: JAMIL  concentration: JAMIL  Jeana:JAMIL  Psychosis: JAMIL  Trauma: " JAMIL  S.I.B.s/risky behavior: JAMIL    Most of this history obtained from chart review due to limited participation by patient.  Past Psychiatric History:  Previous Medication Trials: yes, multiple. Current medications include Zyprexa, Seroquel, and Lithium  Previous Psychiatric Hospitalizations:yes, most recently [facility] 4/2023   Previous Suicide Attempts: yes, three previous suicide attempts by overdose  History of Violence: no  Outpatient Psychiatrist: yes, Dr. Dewey Coates at [facility]    Family Psychiatric History: yes  Father - alcohol use disorder, bipolar disorder  Sister - alcohol use disorder    Substance Abuse History (with emphasis over the last 12 months):  Recreational Drugs:  previously denies  Use of Alcohol:  History of heavy alcohol use/dependence with resultant cirrhosis. Endorses multiple years of sobriety  Tobacco Use:no  Rehab History:yes, previously    Social History:  Marital Status: single  Children: 0 - has two sister involved in her care  Employment Status/Info: on disability  :no  Education: post college graduate work or degree - [employer] from [employer])  Special Ed: no  Housing Status: live in [place], [place] with mother, has caregivers  Access to gun: no  Psychosocial Stressors: health  Functioning Relationships: good support system    Legal History:  Past Charges/Incarcerations: yes DUI  Pending charges:no    Mental Status Exam:  General Appearance:  Appears older than stated age, disheveled, ecchmosis noted to bilateral hands/arms. Swelling of bilateral hands; scleral icterus  Behavior:  minimally engaged with assessment, somnolence  Involuntary Movements and Motor Activity: mild twitching of mouth, concern for TD?  Gait and Station: unable to assess - patient lying down or seated  Speech and Language:  no spontaneous speech  Mood: unable to assess  Affect:  unable to assess  Thought Process and Associations: Unable to Assess  Thought Content and Perceptions:: Unable to  Assess  Sensorium and Orientation: delirious, somnolent; oriented only to self  Recent and Remote Memory: Unable to  Formally Assess  Attention and Concentration: Unable to Formally Assess  Fund of Knowledge: Unable to Formally Assess  Insight: limited/partial awareness of illness - impaired due to encephalopathy  Judgment: impaired due to encephalopathy    CAM ICU positive? yes    EKG  QTc 410ms (5/26/2023)    ASSESSMENT & RECOMMENDATIONS   Bipolar disorder  Alcohol use disorder  Encephalopathy, most likely Hepatic encephalopathy    PSYCH MEDICATIONS  Scheduled  - Continue Abilify 5mg daily. Can hold if condition worsens  PRN: none. Would limit risks of polypharmacy    DELIRIUM  DELIRIUM BEHAVIOR MANAGEMENT  PLEASE utilize CHEMICAL restraints with PRN meds first for agitation. Minimize use of PHYSICAL restraints OR have periods of being out of physical restraints if possible.  Keep window shades open and room lit during day and room dim at night in order to promote normal sleep-wake cycles  Encourage family at bedside. [place] patient often to situation, location, date.  Continue to Limit or Discontinue use of Narcotics, Benzos and Anti-cholinergic medications as they may worsen delirium.  Continue medical workup for causative etiology of Delirium.     Recommend repeat ammonia level as well as consider repeat EEG     RISK ASSESSMENT  NO NEED FOR PEC patient NOT in any imminent danger of hurting self or others and not gravely disabled.     FOLLOW UP  Will follow up while in house    DISPOSITION - once medically cleared:   Defer to medical team    Please contact ON CALL psychiatry service (24/7) for any acute issues that may arise.    Dr. Priya VACA Psychiatry  [facility]  5/29/2023 12:50  PM        --------------------------------------------------------------------------------------------------------------------------------------------------------------------------------------------------------------------------------------    CONTINUED HISTORY & OBJECTIVE clinical data & findings reviewed and noted for above decision making    Past Medical/Surgical History:   Past Medical History:   Diagnosis Date    Addiction to drug     Alcohol abuse     Alcohol abuse, in remission 6/15/2015    14.5 weeks ago; AA weekly    Anemia     Anxiety 6/15/2015    Behavioral problem     Bipolar disorder     Bipolar disorder in remission 6/15/2015    Cirrhosis, Laennec's 6/15/2015    Depression     Encounter for blood transfusion     Epistaxis 6/15/2015    Fatigue     Glaucoma     Hematuria     Hepatic encephalopathy 6/15/2015    Hepatic enlargement     History of psychiatric care     History of psychiatric hospitalization     History of seizure 6/15/2015    1    hx of intentional Tylenol overdose 6/15/2015    2005- situational and hx of bipolar    Hx of psychiatric care     Macrocytic anemia 9/18/2015    6 units PRBC since June 2015    Jeana     Osteoarthritis     Other ascites 6/15/2015    Psychiatric exam requested by authority     Psychiatric problem     Psychosis 9/26/2019    Renal disorder     Seizures     Self-harming behavior     Suicide attempt     Therapy     Thrombocytopenia 6/15/2015     Past Surgical History:   Procedure Laterality Date    COSMETIC SURGERY      ESOPHAGOGASTRODUODENOSCOPY         Current Medications:   Scheduled Meds:    ARIPiprazole  5 mg Oral Daily    folic acid  1 mg Oral Daily    lactulose  30 g Oral TID    levetiracetam  1,000 mg Oral BID    phytonadione ((AQUA-MEPHYTON) IVPB  10 mg Intravenous Daily    rifAXIMin  550 mg Oral BID    senna-docusate 8.6-50 mg  1 tablet Oral BID    sodium bicarbonate  1,300 mg Oral BID    zonisamide  100 mg Oral Daily     PRN Meds: acetaminophen,  aluminum-magnesium hydroxide-simethicone, dextrose 10%, dextrose 10%, dextrose, dextrose, glucagon (human recombinant), insulin aspart U-100, melatonin, naloxone, ondansetron, prochlorperazine, simethicone, sodium chloride 0.9%      Allergies:   Review of patient's allergies indicates:   Allergen Reactions    Sulfa (sulfonamide antibiotics) Rash    Codeine Nausea And Vomiting       Vitals  Vitals:    05/29/23 1221   BP: (!) 114/55   Pulse: 70   Resp: 16   Temp: 98 °F (36.7 °C)       Labs/Imaging/Studies:  Recent Results (from the past 24 hour(s))   CBC Auto Differential    Collection Time: 05/28/23  1:30 PM   Result Value Ref Range    WBC 11.61 3.90 - 12.70 K/uL    RBC 2.68 (L) 4.00 - 5.40 M/uL    Hemoglobin 9.0 (L) 12.0 - 16.0 g/dL    Hematocrit 29.5 (L) 37.0 - 48.5 %     (H) 82 - 98 fL    MCH 33.6 (H) 27.0 - 31.0 pg    MCHC 30.5 (L) 32.0 - 36.0 g/dL    RDW 20.8 (H) 11.5 - 14.5 %    Platelets 82 (L) 150 - 450 K/uL    MPV 10.4 9.2 - 12.9 fL    Immature Granulocytes 0.4 0.0 - 0.5 %    Gran # (ANC) 9.5 (H) 1.8 - 7.7 K/uL    Immature Grans (Abs) 0.05 (H) 0.00 - 0.04 K/uL    Lymph # 1.2 1.0 - 4.8 K/uL    Mono # 0.8 0.3 - 1.0 K/uL    Eos # 0.1 0.0 - 0.5 K/uL    Baso # 0.02 0.00 - 0.20 K/uL    nRBC 1 (A) 0 /100 WBC    Gran % 81.9 (H) 38.0 - 73.0 %    Lymph % 10.2 (L) 18.0 - 48.0 %    Mono % 6.8 4.0 - 15.0 %    Eosinophil % 0.5 0.0 - 8.0 %    Basophil % 0.2 0.0 - 1.9 %    Aniso Slight     Poik Slight     Poly Occasional     Hypo Occasional     Ovalocytes Occasional     [place] Cells Occasional     Toxic Granulation Present     Smudge Cells Present     Fragmented Cells Occasional     Differential Method Automated    Comprehensive Metabolic Panel    Collection Time: 05/28/23  1:30 PM   Result Value Ref Range    Sodium 150 (H) 136 - 145 mmol/L    Potassium 3.5 3.5 - 5.1 mmol/L    Chloride 125 (H) 95 - 110 mmol/L    CO2 19 (L) 23 - 29 mmol/L    Glucose 119 (H) 70 - 110 mg/dL    BUN 22 (H) 6 - 20 mg/dL    Creatinine 0.5 0.5  - 1.4 mg/dL    Calcium 8.6 (L) 8.7 - 10.5 mg/dL    Total Protein 4.0 (L) 6.0 - 8.4 g/dL    Albumin 1.6 (L) 3.5 - 5.2 g/dL    Total Bilirubin 3.7 (H) 0.1 - 1.0 mg/dL    Alkaline Phosphatase 182 (H) 55 - 135 U/L    AST 88 (H) 10 - 40 U/L    ALT 69 (H) 10 - 44 U/L    Anion Gap 6 (L) 8 - 16 mmol/L    eGFR >60.0 >60 mL/min/1.73 m^2   Lithium level    Collection Time: 05/28/23  1:30 PM   Result Value Ref Range    Lithium Level 0.4 (L) 0.6 - 1.2 mmol/L   Protime-INR    Collection Time: 05/28/23  1:30 PM   Result Value Ref Range    Prothrombin Time 22.3 (H) 9.0 - 12.5 sec    INR 2.2 (H) 0.8 - 1.2   Calcium, ionized    Collection Time: 05/28/23  1:30 PM   Result Value Ref Range    Ionized Calcium 1.29 1.06 - 1.42 mmol/L   POCT glucose    Collection Time: 05/28/23  5:54 PM   Result Value Ref Range    POCT Glucose 194 (H) 70 - 110 mg/dL   ISTAT PROCEDURE    Collection Time: 05/28/23  6:03 PM   Result Value Ref Range    POC PH 7.424 7.35 - 7.45    POC PCO2 28.3 (L) 35 - 45 mmHg    POC PO2 25 (L) 40 - 60 mmHg    POC HCO3 18.5 (L) 24 - 28 mmol/L    POC BE -6 -2 to 2 mmol/L    POC SATURATED O2 49 (L) 95 - 100 %    POC TCO2 19 (L) 24 - 29 mmol/L    Rate 20     Sample VENOUS     Site Other     Allens Test N/A     DelSys Nasal Can     Mode SPONT     Flow 3     Sp02 92    POCT glucose    Collection Time: 05/28/23 10:54 PM   Result Value Ref Range    POCT Glucose 331 (H) 70 - 110 mg/dL   POCT glucose    Collection Time: 05/28/23 11:36 PM   Result Value Ref Range    POCT Glucose 273 (H) 70 - 110 mg/dL   Comprehensive Metabolic Panel (CMP)    Collection Time: 05/29/23  6:01 AM   Result Value Ref Range    Sodium 148 (H) 136 - 145 mmol/L    Potassium 3.5 3.5 - 5.1 mmol/L    Chloride 124 (H) 95 - 110 mmol/L    CO2 16 (L) 23 - 29 mmol/L    Glucose 153 (H) 70 - 110 mg/dL    BUN 22 (H) 6 - 20 mg/dL    Creatinine 0.9 0.5 - 1.4 mg/dL    Calcium 8.2 (L) 8.7 - 10.5 mg/dL    Total Protein 4.3 (L) 6.0 - 8.4 g/dL    Albumin 1.5 (L) 3.5 - 5.2 g/dL     Total Bilirubin 3.1 (H) 0.1 - 1.0 mg/dL    Alkaline Phosphatase 178 (H) 55 - 135 U/L     (H) 10 - 40 U/L    ALT 67 (H) 10 - 44 U/L    Anion Gap 8 8 - 16 mmol/L    eGFR >60.0 >60 mL/min/1.73 m^2   Magnesium    Collection Time: 05/29/23  6:01 AM   Result Value Ref Range    Magnesium 1.9 1.6 - 2.6 mg/dL   Phosphorus    Collection Time: 05/29/23  6:01 AM   Result Value Ref Range    Phosphorus 2.3 (L) 2.7 - 4.5 mg/dL   Vitamin B12    Collection Time: 05/29/23  6:01 AM   Result Value Ref Range    Vitamin B-12 >2000 (H) 210 - 950 pg/mL   Folate    Collection Time: 05/29/23  6:01 AM   Result Value Ref Range    Folate 13.5 4.0 - 24.0 ng/mL   Protime-INR    Collection Time: 05/29/23  6:56 AM   Result Value Ref Range    Prothrombin Time 19.3 (H) 9.0 - 12.5 sec    INR 1.9 (H) 0.8 - 1.2   CBC auto differential    Collection Time: 05/29/23  6:56 AM   Result Value Ref Range    WBC 14.29 (H) 3.90 - 12.70 K/uL    RBC 2.71 (L) 4.00 - 5.40 M/uL    Hemoglobin 9.1 (L) 12.0 - 16.0 g/dL    Hematocrit 30.6 (L) 37.0 - 48.5 %     (H) 82 - 98 fL    MCH 33.6 (H) 27.0 - 31.0 pg    MCHC 29.7 (L) 32.0 - 36.0 g/dL    RDW 21.9 (H) 11.5 - 14.5 %    Platelets 71 (L) 150 - 450 K/uL    MPV 10.1 9.2 - 12.9 fL    Immature Granulocytes 0.6 (H) 0.0 - 0.5 %    Gran # (ANC) 10.8 (H) 1.8 - 7.7 K/uL    Immature Grans (Abs) 0.08 (H) 0.00 - 0.04 K/uL    Lymph # 1.8 1.0 - 4.8 K/uL    Mono # 1.3 (H) 0.3 - 1.0 K/uL    Eos # 0.3 0.0 - 0.5 K/uL    Baso # 0.03 0.00 - 0.20 K/uL    nRBC 1 (A) 0 /100 WBC    Gran % 75.5 (H) 38.0 - 73.0 %    Lymph % 12.9 (L) 18.0 - 48.0 %    Mono % 9.0 4.0 - 15.0 %    Eosinophil % 1.8 0.0 - 8.0 %    Basophil % 0.2 0.0 - 1.9 %    Platelet Estimate Decreased (A)     Aniso Slight     Poik Slight     Poly Occasional     Ovalocytes Occasional     [place] Cells Occasional     Basophilic Stippling Occasional     [place]le Bodies Present     Toxic Granulation Present     Differential Method Automated    Hemoglobin A1c    Collection Time:  05/29/23  6:56 AM   Result Value Ref Range    Hemoglobin A1C <4.0 (A) 4.0 - 5.6 %    Estimated Avg Glucose Unable to calculate 68 - 131 mg/dL   POCT glucose    Collection Time: 05/29/23 10:14 AM   Result Value Ref Range    POCT Glucose 146 (H) 70 - 110 mg/dL

## 2023-05-29 NOTE — PROGRESS NOTES
[facility] Medicine  Progress Note    Patient Name: Marely Hamilton  MRN: [id number]  Patient Class: IP- Inpatient   Admission Date: 5/28/2023  Length of Stay: 1 days  Attending Physician: Derick Winston MD  Primary Care Provider: Viktor Ross MD        Subjective:     Principal Problem:Acute encephalopathy        HPI:  Patient is a 58 y.o.female w/ PMHx of EtOH induced cirrhosis, seizure on AEDs, and bipolar disorder on lithium who initially presented to [facility] on 5/18/2023 with a primary complaint of altered mental status suspected to be from hepatic encephalopathy versus hypercalcemia who despite correction of her calcium and treatment with lactulose and rifaximin continued to be encephalopathic.  Given patient's persistent encephalopathy, there was a concern that she had uncontrolled hepatic encephalopathy and was transferred to [facility] for further evaluation.    During patient's hospitalization at [facility], she was noted to be hypercalcemic likely secondary to her lithium use for her bipolar disorder.  She was started on aripiprazole at that time.  Given her inability to follow commands, she had an NG-tube placed for her to safely accept medicines.  Upon arrival here, her NG tube was essentially dislodged and subsequently removed.  On bedside swallow assessment, patient was able to follow commands and swallow safely.  Additionally, on arrival patient had a small bowel movement.  When asked where she was, remained silent.  She later shared unprompted that she has a history of glaucoma.  Patient later stated her full name.  She was able to follow simple commands, but not 2 step commands.      Overview/Hospital Course:  No notes on file    Interval History: No acute events overnight. Patient this AM doing about the same as yesterday. She was slow to respond to ROS and only followed commands after reorientation twice.    Review of Systems    Unable to perform ROS: Mental status change   Objective:     Vital Signs (Most Recent):  Temp: 98 °F (36.7 °C) (05/29/23 1221)  Pulse: 70 (05/29/23 1221)  Resp: 16 (05/29/23 1221)  BP: (!) 114/55 (05/29/23 1221)  SpO2: 98 % (05/29/23 1221) Vital Signs (24h Range):  Temp:  [94.9 °F (34.9 °C)-98.9 °F (37.2 °C)] 98 °F (36.7 °C)  Pulse:  [64-78] 70  Resp:  [16-28] 16  SpO2:  [92 %-98 %] 98 %  BP: ()/(53-60) 114/55     Weight: 59.9 kg (132 lb)  Body mass index is 24.14 kg/m².    Intake/Output Summary (Last 24 hours) at 5/29/2023 1355  Last data filed at 5/28/2023 2000  Gross per 24 hour   Intake 240 ml   Output --   Net 240 ml         Physical Exam  Constitutional:       General: She is not in acute distress.     Appearance: She is well-developed. She is ill-appearing and toxic-appearing.   HENT:      Head: Normocephalic and atraumatic.   Eyes:      General: Scleral icterus present.      Conjunctiva/sclera: Conjunctivae normal.      Pupils: Pupils are equal, round, and reactive to light.   Neck:      Thyroid: No thyromegaly.      Vascular: No JVD.   Cardiovascular:      Rate and Rhythm: Normal rate and regular rhythm.      Heart sounds: Normal heart sounds. No murmur heard.    No friction rub. No gallop.   Pulmonary:      Effort: Pulmonary effort is normal.      Breath sounds: Normal breath sounds. No wheezing or rales.   Abdominal:      General: Bowel sounds are normal. There is no distension.      Palpations: Abdomen is soft.      Tenderness: There is no abdominal tenderness. There is no guarding or rebound.   Musculoskeletal:         General: No tenderness. Normal range of motion.      Cervical back: Neck supple.      Right lower leg: No edema.      Left lower leg: No edema.      Comments: Edema in hands bilaterally   Skin:     General: Skin is warm and dry.      Coloration: Skin is not jaundiced.   Neurological:      Mental Status: She is lethargic, disoriented and confused.      Cranial Nerves: No cranial  nerve deficit.   Psychiatric:         Cognition and Memory: Cognition is impaired. Memory is impaired.           Significant Labs: All pertinent labs within the past 24 hours have been reviewed.    Significant Imaging: I have reviewed all pertinent imaging results/findings within the past 24 hours.      Assessment/Plan:      * Acute encephalopathy  Patient presents as an outside hospital transfer for persistent encephalopathy.  She is been treated for her hypercalcemia as well as hepatic encephalopathy and continues to remain encephalopathic with concerns for persistent encephalopathy from a hepatic source.  While at the outside hospital, she was taken off of lithium and transitioned to aripiprazole for her bipolar disorder.  She had an EEG performed which showed diffuse slowing consistent with encephalopathy, however no epileptiform discharges.  Patient has a seizure history for which she was on Keppra and zonisamide.  On evaluation at [facility], patient able to follow one-step commands, oriented to self but not situation or time, no clonus in the lower extremities, but patient has fine motor tremors. MRI imaging at OSH unremarkable.  - Continue Keppra 1g q12h and zonisamide 100mg q12  - Continue lactulose (increased to 30g TID) and rifaximin  - q4h neuro checks  - Continue aripiprazole  - Lithium low  - Check PETH level      Hepatic cirrhosis  MELD-Na: 18 at 5/29/2023  6:56 AM  MELD: 18 at 5/29/2023  6:56 AM  Calculated from:  Serum Creatinine: 0.9 mg/dL (Using min of 1 mg/dL) at 5/29/2023  6:01 AM  Serum Sodium: 148 mmol/L (Using max of 137 mmol/L) at 5/29/2023  6:01 AM  Total Bilirubin: 3.1 mg/dL at 5/29/2023  6:01 AM  INR(ratio): 1.9 at 5/29/2023  6:56 AM    Hx of etoh cirrhosis, current elevated MELD driven by INR and tbili. Concerns for acute decompensation with HE. No evidence of volume overload. Last EGD in 2019 but unable to see records.   - Continue lactulose and rifaximin for HE  - Given  insterstitial opacities seen on imaging and O2 req, will give lasix 20mg IV x1. Patient had received fluids for her hypernatremia.  - Hepatology consulted for possible transplant evaluation, though history of BP and inconsistent use of lactulose for HE may indicate psychosocial barriers to being listed        Acute hypoxemic respiratory failure  Patient with Hypoxic Respiratory failure which is Acute.  she is not on home oxygen. Supplemental oxygen was provided and noted- Oxygen Concentration (%):  [28-36] 32    .   Signs/symptoms of respiratory failure include- hypoxia. Contributing diagnoses includes - N/A Labs and images were reviewed. Patient Has not had a recent ABG. Will treat underlying causes and adjust management of respiratory failure as follows  - CXR personally reviewed, demonstrates bilateral interstitial opacities with possible blunting of costophrenic angle on R side. Concern for pulmonary edema given recent fluids she received for hypercalcemia vs multifocal PNA however patient does not appear infectious  - TTE with showing left sided pleural effusion, normal EF, normal CVP  - gave lasix x1 this AM, monitoring for worsening hypernatremia    Hypernatremia  Down to 146 before transfer, initially elevated to 155 2/2 hypovolemia from hypercalcemia. Patient received NaCl infusions to help with hypercalcemia  - Na on transfer 150  - Continue d5 100cc/hr      Bipolar 1 disorder  Continue aripiprazole, will consult psychiatry here given initiation of new BP medication        Seizure  Continue keppra and zonisamide      Hepatic encephalopathy  Chronic, unstable  - Continuing lactulsoe and rifaximin as state elsewhere      Macrocytic anemia  Chronic, stable  - Hgb 9.0  - CTM  - transfuse for <7        Thrombocytopenia  Chronic issue  - slightly worse today. No petechiae on exam  - Continue to monitor with daily cbc  - Transfuse for platelets <10k or <50k w/ active bleeding        VTE Risk Mitigation (From  admission, onward)         Ordered     IP VTE LOW RISK PATIENT  Once         05/28/23 1129     Place sequential compression device  Until discontinued         05/28/23 1129                Discharge Planning   BRAYAN: 5/31/2023     Code Status: Full Code   Is the patient medically ready for discharge?: No    Reason for patient still in hospital (select all that apply): Patient trending condition  Discharge Plan A:  (TBD)                  Derick Winston MD  Department of Hospital Medicine   [facility] - Transplant Stepdown

## 2023-05-29 NOTE — ASSESSMENT & PLAN NOTE
MELD-Na: 18 at 5/29/2023  6:56 AM  MELD: 18 at 5/29/2023  6:56 AM  Calculated from:  Serum Creatinine: 0.9 mg/dL (Using min of 1 mg/dL) at 5/29/2023  6:01 AM  Serum Sodium: 148 mmol/L (Using max of 137 mmol/L) at 5/29/2023  6:01 AM  Total Bilirubin: 3.1 mg/dL at 5/29/2023  6:01 AM  INR(ratio): 1.9 at 5/29/2023  6:56 AM    Hx of etoh cirrhosis, current elevated MELD driven by INR and tbili. Concerns for acute decompensation with HE. No evidence of volume overload. Last EGD in 2019 but unable to see records.   - Continue lactulose and rifaximin for HE  - Given insterstitial opacities seen on imaging and O2 req, will give lasix 20mg IV x1. Patient had received fluids for her hypernatremia.  - Hepatology consulted for possible transplant evaluation, though history of BP and inconsistent use of lactulose for HE may indicate psychosocial barriers to being listed

## 2023-05-29 NOTE — PLAN OF CARE
Problem: SLP  Goal: SLP Goal  Outcome: Met   Bedside Swallow Study completed. Recommend: Dental soft diet, thin liquids, standard aspiration precautions. Patient okay to upgrade to regular solids upon request. Patient reports preference to remain on soft diet at this time. No further skilled ST services warranted at this time. Please re-consult as needed.

## 2023-05-29 NOTE — ASSESSMENT & PLAN NOTE
This is a 58 year old female with PMH significant for ETOH cirrhosis (associated with ascites and encephalopathy; declined for transplant here in 12/2015 due to malnutrition), bipolar disorder (on Lithium), and unspecified seizure disorder (on AEDs) who was admitted to [facility] on 05/28 as a transfer from [facility] for management of hepatic encephalopathy following initial admission on 05/18 for acute onset of encephalopathy associated with UTI secondary to Proteus, hypernatremia, and hypercalcemia. She is status post treatment for UTI and electrolyte derangements without improvement in encephalopathy; CT and MRI brain unremarkable and EEG negative for seizures.     Recommendations:     -Lactulose TID and Rifaximin BID; titrate Lactulose to 3-4 bowel movements daily. If not deemed safe for PO intake, then use Lactulose enemas.   -Okay to hold home diuretics without hypervolemia noted on exam.   -Follow-up repeat PETH.   -Minimize use of medications that may precipitate encephalopathy (e.g. opioids and benzos).   -CMP and INR daily.   -Despite MELD score of 20, no plans for re-initiation of transplant evaluation given encephalopathy limiting ability to confirm social history with patient and ETOH cessation.

## 2023-05-29 NOTE — NURSING
Unable to obtain oral/axillary temp on pt. Rectal temp 94.9. Notified Dr. Andesron with [facility]. New order to apply warming blanket. Wctm

## 2023-05-29 NOTE — PLAN OF CARE
Pt oriented to person, but only sometimes oriented to place, situation, and time. VS stable. Pt in bed and calm. Pt on 4L O2 NC - sats 92-94%. Na 149; Cl 120. LFTs elevated. INR 1.9 - vitamin K hung. Dysphagia mechanical soft diet. Pt on cont pulse ox. D5 cont running at 75cc/hr. Neuro checks done Q4hr. Pt flat and delayed in responses. Slight upper extremity tremors. Lasix given. Echo done. 1 BM and 1 UO unmeasured.

## 2023-05-29 NOTE — PLAN OF CARE
[facility] - Transplant Stepdown  Initial Discharge Assessment       Primary Care Provider: Viktor Ross MD    Admission Diagnosis: Acute encephalopathy [G93.40]    Admission Date: 5/28/2023  Expected Discharge Date: 5/31/2023    Transition of Care Barriers: Mental illness    Payor: HU[employer] MANAGED MEDICARE / Plan: HU[employer] MEDICARE HMO / Product Type: Capitation /     Extended Emergency Contact Information  Primary Emergency Contact: Randell Hamilton   [facility]  Home Phone: [phone]  Relation: Sister  Secondary Emergency Contact: Anastasia Hamilton   [place]  Mobile Phone: [phone]  Relation: Mother    Discharge Plan A:  (TBD)  Discharge Plan B:  (TBD)      [facility] - [place], [place] - [address]  [address]  [place] [address]  Phone: [phone] Fax: [phone]      Initial Assessment (most recent)       Adult Discharge Assessment - 05/29/23 0925          Discharge Assessment    Assessment Type Discharge Planning Assessment     Confirmed/corrected address, phone number and insurance Yes     Confirmed Demographics Correct on Facesheet     Source of Information family     Communicated BRAYAN with patient/caregiver Date not available/Unable to determine     People in Home alone   patient lived with her mother who recently passed away    Facility Arrived From: [facility]     Do you expect to return to your current living situation? --   TBD    Prior to hospitilization cognitive status: Unable to Assess     Current cognitive status: Unable to Assess     Home Layout Able to live on 1st floor     Readmission within 30 days? Yes     Patient currently being followed by outpatient case management? No     Do you take prescription medications? Yes     Do you have prescription coverage? Yes     Who is going to help you get home at discharge? SISTER RANDELL      How do you get to doctors appointments? family or friend will provide      Are you on dialysis? No     Do you take coumadin? No     Discharge Plan A --   TBD    Discharge Plan B --   TBD    DME Needed Upon Discharge  --   TBD    Discharge Plan discussed with: Sibling     Transition of Care Barriers Mental illness        Physical Activity    On average, how many days per week do you engage in moderate to strenuous exercise (like a brisk walk)? Patient refused     On average, how many minutes do you engage in exercise at this level? Patient refused        Financial Resource Strain    How hard is it for you to pay for the very basics like food, housing, medical care, and heating? Patient refused        Housing Stability    In the last 12 months, was there a time when you were not able to pay the mortgage or rent on time? Patient refused     In the last 12 months, was there a time when you did not have a steady place to sleep or slept in a shelter (including now)? Patient refused        Transportation Needs    In the past 12 months, has lack of transportation kept you from medical appointments or from getting medications? Patient refused     In the past 12 months, has lack of transportation kept you from meetings, work, or from getting things needed for daily living? Patient refused        Food Insecurity    Within the past 12 months, you worried that your food would run out before you got the money to buy more. Patient refused     Within the past 12 months, the food you bought just didn't last and you didn't have money to get more. Patient refused        Stress    Do you feel stress - tense, restless, nervous, or anxious, or unable to sleep at night because your mind is troubled all the time - these days? Patient refused        Social Connections    In a typical week, how many times do you talk on the phone with family, friends, or neighbors? Patient refused     How often do you get together with friends or relatives? Patient refused     How often do you attend Episcopalian or Orthodox services?  Patient refused     Do you belong to any clubs or organizations such as Islam groups, unions, fraternal or athletic groups, or school groups? Patient refused     How often do you attend meetings of the clubs or organizations you belong to? Patient refused     Are you , , , , never , or living with a partner? Patient refused        Alcohol Use    Q1: How often do you have a drink containing alcohol? Patient refused     Q2: How many drinks containing alcohol do you have on a typical day when you are drinking? Patient refused     Q3: How often do you have six or more drinks on one occasion? Patient refused                   Patient lved in a 1 story house with her mother (who has since  and patient is unaware ) she is not dialysis and does not take coumadin

## 2023-05-29 NOTE — PLAN OF CARE
-Pt arouses to voice, follows simple commands and has delayed responses.   -Pt oriented to self only overnight. Q4h neuro checks completed.   -Pt on 4 L NC. O2 sats 92-95%. Pt placed on christopher hugger overnight for rectal temp of 94.9. Temp improved and christopher hugger off at this time. Last temp 97.5 axillary. Visi and bedside spo2 monitoring place.  -GLORIA midline intact. Pt receiving D5 gtt for hypernatremia. Dr Ortiz notified of pt's increased RR and pt stating she feels SOB. Pt had not yet voided on shift as of 0300. D5 rate decreased fto 50 cc/hr and 1 time dose of lasix ordered.  -pt had 1 large unmeasured urine occurrence post lasix administration.  -[facility] soft diet in place. Pt taking pills crushed with juice or apple sauce.  -Lactulose administered per order. 4-5 liquid BM's overnight.  -Pt turned Q2h. Heel boots in place. Barrier cream applied to sacrum.  -Bed alarm set. Fall/safety precautions maintained. VS and assessments in flowsheets.

## 2023-05-29 NOTE — SUBJECTIVE & OBJECTIVE
Interval History: No acute events overnight. Patient this AM doing about the same as yesterday. She was slow to respond to ROS and only followed commands after reorientation twice.    Review of Systems   Unable to perform ROS: Mental status change   Objective:     Vital Signs (Most Recent):  Temp: 98 °F (36.7 °C) (05/29/23 1221)  Pulse: 70 (05/29/23 1221)  Resp: 16 (05/29/23 1221)  BP: (!) 114/55 (05/29/23 1221)  SpO2: 98 % (05/29/23 1221) Vital Signs (24h Range):  Temp:  [94.9 °F (34.9 °C)-98.9 °F (37.2 °C)] 98 °F (36.7 °C)  Pulse:  [64-78] 70  Resp:  [16-28] 16  SpO2:  [92 %-98 %] 98 %  BP: ()/(53-60) 114/55     Weight: 59.9 kg (132 lb)  Body mass index is 24.14 kg/m².    Intake/Output Summary (Last 24 hours) at 5/29/2023 1355  Last data filed at 5/28/2023 2000  Gross per 24 hour   Intake 240 ml   Output --   Net 240 ml         Physical Exam  Constitutional:       General: She is not in acute distress.     Appearance: She is well-developed. She is ill-appearing and toxic-appearing.   HENT:      Head: Normocephalic and atraumatic.   Eyes:      General: Scleral icterus present.      Conjunctiva/sclera: Conjunctivae normal.      Pupils: Pupils are equal, round, and reactive to light.   Neck:      Thyroid: No thyromegaly.      Vascular: No JVD.   Cardiovascular:      Rate and Rhythm: Normal rate and regular rhythm.      Heart sounds: Normal heart sounds. No murmur heard.    No friction rub. No gallop.   Pulmonary:      Effort: Pulmonary effort is normal.      Breath sounds: Normal breath sounds. No wheezing or rales.   Abdominal:      General: Bowel sounds are normal. There is no distension.      Palpations: Abdomen is soft.      Tenderness: There is no abdominal tenderness. There is no guarding or rebound.   Musculoskeletal:         General: No tenderness. Normal range of motion.      Cervical back: Neck supple.      Right lower leg: No edema.      Left lower leg: No edema.      Comments: Edema in hands  bilaterally   Skin:     General: Skin is warm and dry.      Coloration: Skin is not jaundiced.   Neurological:      Mental Status: She is lethargic, disoriented and confused.      Cranial Nerves: No cranial nerve deficit.   Psychiatric:         Cognition and Memory: Cognition is impaired. Memory is impaired.           Significant Labs: All pertinent labs within the past 24 hours have been reviewed.    Significant Imaging: I have reviewed all pertinent imaging results/findings within the past 24 hours.

## 2023-05-30 LAB
ALBUMIN SERPL BCP-MCNC: 1.4 G/DL (ref 3.5–5.2)
ALP SERPL-CCNC: 185 U/L (ref 55–135)
ALT SERPL W/O P-5'-P-CCNC: 81 U/L (ref 10–44)
ANION GAP SERPL CALC-SCNC: 7 MMOL/L (ref 8–16)
ANISOCYTOSIS BLD QL SMEAR: SLIGHT
AST SERPL-CCNC: 106 U/L (ref 10–40)
BASOPHILS # BLD AUTO: 0.02 K/UL (ref 0–0.2)
BASOPHILS NFR BLD: 0.2 % (ref 0–1.9)
BILIRUB SERPL-MCNC: 3.4 MG/DL (ref 0.1–1)
BUN SERPL-MCNC: 20 MG/DL (ref 6–20)
BURR CELLS BLD QL SMEAR: ABNORMAL
CALCIUM SERPL-MCNC: 8 MG/DL (ref 8.7–10.5)
CHLORIDE SERPL-SCNC: 119 MMOL/L (ref 95–110)
CO2 SERPL-SCNC: 18 MMOL/L (ref 23–29)
CREAT SERPL-MCNC: 0.6 MG/DL (ref 0.5–1.4)
DIFFERENTIAL METHOD: ABNORMAL
EOSINOPHIL # BLD AUTO: 0.3 K/UL (ref 0–0.5)
EOSINOPHIL NFR BLD: 2 % (ref 0–8)
ERYTHROCYTE [DISTWIDTH] IN BLOOD BY AUTOMATED COUNT: 22.2 % (ref 11.5–14.5)
EST. GFR  (NO RACE VARIABLE): >60 ML/MIN/1.73 M^2
GLUCOSE SERPL-MCNC: 162 MG/DL (ref 70–110)
HCT VFR BLD AUTO: 26.6 % (ref 37–48.5)
HGB BLD-MCNC: 7.9 G/DL (ref 12–16)
IMM GRANULOCYTES # BLD AUTO: 0.07 K/UL (ref 0–0.04)
IMM GRANULOCYTES NFR BLD AUTO: 0.5 % (ref 0–0.5)
INR PPP: 1.8 (ref 0.8–1.2)
LYMPHOCYTES # BLD AUTO: 1.9 K/UL (ref 1–4.8)
LYMPHOCYTES NFR BLD: 14.7 % (ref 18–48)
MAGNESIUM SERPL-MCNC: 1.7 MG/DL (ref 1.6–2.6)
MCH RBC QN AUTO: 33.6 PG (ref 27–31)
MCHC RBC AUTO-ENTMCNC: 29.7 G/DL (ref 32–36)
MCV RBC AUTO: 113 FL (ref 82–98)
MONOCYTES # BLD AUTO: 1 K/UL (ref 0.3–1)
MONOCYTES NFR BLD: 7.6 % (ref 4–15)
NEUTROPHILS # BLD AUTO: 9.9 K/UL (ref 1.8–7.7)
NEUTROPHILS NFR BLD: 75 % (ref 38–73)
NRBC BLD-RTO: 0 /100 WBC
PHOSPHATE SERPL-MCNC: 2 MG/DL (ref 2.7–4.5)
PLATELET # BLD AUTO: 56 K/UL (ref 150–450)
PLATELET BLD QL SMEAR: ABNORMAL
PMV BLD AUTO: 10.2 FL (ref 9.2–12.9)
POCT GLUCOSE: 199 MG/DL (ref 70–110)
POCT GLUCOSE: 219 MG/DL (ref 70–110)
POIKILOCYTOSIS BLD QL SMEAR: SLIGHT
POTASSIUM SERPL-SCNC: 3.8 MMOL/L (ref 3.5–5.1)
PROT SERPL-MCNC: 4.1 G/DL (ref 6–8.4)
PROTHROMBIN TIME: 18.6 SEC (ref 9–12.5)
RBC # BLD AUTO: 2.35 M/UL (ref 4–5.4)
SODIUM SERPL-SCNC: 144 MMOL/L (ref 136–145)
WBC # BLD AUTO: 13.16 K/UL (ref 3.9–12.7)

## 2023-05-30 PROCEDURE — 99233 SBSQ HOSP IP/OBS HIGH 50: CPT | Mod: ,,, | Performed by: STUDENT IN AN ORGANIZED HEALTH CARE EDUCATION/TRAINING PROGRAM

## 2023-05-30 PROCEDURE — 83735 ASSAY OF MAGNESIUM: CPT | Performed by: STUDENT IN AN ORGANIZED HEALTH CARE EDUCATION/TRAINING PROGRAM

## 2023-05-30 PROCEDURE — 20600001 HC STEP DOWN PRIVATE ROOM

## 2023-05-30 PROCEDURE — 99233 PR SUBSEQUENT HOSPITAL CARE,LEVL III: ICD-10-PCS | Mod: ,,, | Performed by: STUDENT IN AN ORGANIZED HEALTH CARE EDUCATION/TRAINING PROGRAM

## 2023-05-30 PROCEDURE — 36415 COLL VENOUS BLD VENIPUNCTURE: CPT | Performed by: STUDENT IN AN ORGANIZED HEALTH CARE EDUCATION/TRAINING PROGRAM

## 2023-05-30 PROCEDURE — 25000003 PHARM REV CODE 250: Performed by: STUDENT IN AN ORGANIZED HEALTH CARE EDUCATION/TRAINING PROGRAM

## 2023-05-30 PROCEDURE — 85025 COMPLETE CBC W/AUTO DIFF WBC: CPT | Performed by: STUDENT IN AN ORGANIZED HEALTH CARE EDUCATION/TRAINING PROGRAM

## 2023-05-30 PROCEDURE — 99233 SBSQ HOSP IP/OBS HIGH 50: CPT | Mod: GC,,, | Performed by: PSYCHIATRY & NEUROLOGY

## 2023-05-30 PROCEDURE — 63600175 PHARM REV CODE 636 W HCPCS: Performed by: STUDENT IN AN ORGANIZED HEALTH CARE EDUCATION/TRAINING PROGRAM

## 2023-05-30 PROCEDURE — 97530 THERAPEUTIC ACTIVITIES: CPT

## 2023-05-30 PROCEDURE — 97162 PT EVAL MOD COMPLEX 30 MIN: CPT

## 2023-05-30 PROCEDURE — 84100 ASSAY OF PHOSPHORUS: CPT | Performed by: STUDENT IN AN ORGANIZED HEALTH CARE EDUCATION/TRAINING PROGRAM

## 2023-05-30 PROCEDURE — 80053 COMPREHEN METABOLIC PANEL: CPT | Performed by: STUDENT IN AN ORGANIZED HEALTH CARE EDUCATION/TRAINING PROGRAM

## 2023-05-30 PROCEDURE — 85610 PROTHROMBIN TIME: CPT | Performed by: STUDENT IN AN ORGANIZED HEALTH CARE EDUCATION/TRAINING PROGRAM

## 2023-05-30 PROCEDURE — 97112 NEUROMUSCULAR REEDUCATION: CPT

## 2023-05-30 PROCEDURE — 97165 OT EVAL LOW COMPLEX 30 MIN: CPT

## 2023-05-30 PROCEDURE — 99233 PR SUBSEQUENT HOSPITAL CARE,LEVL III: ICD-10-PCS | Mod: GC,,, | Performed by: PSYCHIATRY & NEUROLOGY

## 2023-05-30 RX ORDER — FUROSEMIDE 10 MG/ML
40 INJECTION INTRAMUSCULAR; INTRAVENOUS ONCE
Status: COMPLETED | OUTPATIENT
Start: 2023-05-30 | End: 2023-05-30

## 2023-05-30 RX ORDER — LEVETIRACETAM 100 MG/ML
1000 SOLUTION ORAL 2 TIMES DAILY
Status: DISCONTINUED | OUTPATIENT
Start: 2023-05-30 | End: 2023-05-31

## 2023-05-30 RX ADMIN — SODIUM BICARBONATE 1300 MG: 650 TABLET ORAL at 09:05

## 2023-05-30 RX ADMIN — LACTULOSE 30 G: 20 SOLUTION ORAL at 08:05

## 2023-05-30 RX ADMIN — RIFAXIMIN 550 MG: 550 TABLET ORAL at 08:05

## 2023-05-30 RX ADMIN — LEVETIRACETAM 1000 MG: 500 SOLUTION ORAL at 08:05

## 2023-05-30 RX ADMIN — FOLIC ACID 1 MG: 1 TABLET ORAL at 09:05

## 2023-05-30 RX ADMIN — FUROSEMIDE 40 MG: 10 INJECTION, SOLUTION INTRAMUSCULAR; INTRAVENOUS at 12:05

## 2023-05-30 RX ADMIN — POTASSIUM BICARBONATE 50 MEQ: 978 TABLET, EFFERVESCENT ORAL at 09:05

## 2023-05-30 RX ADMIN — DEXTROSE MONOHYDRATE: 50 INJECTION, SOLUTION INTRAVENOUS at 01:05

## 2023-05-30 RX ADMIN — RIFAXIMIN 550 MG: 550 TABLET ORAL at 09:05

## 2023-05-30 RX ADMIN — SENNOSIDES AND DOCUSATE SODIUM 1 TABLET: 50; 8.6 TABLET ORAL at 08:05

## 2023-05-30 RX ADMIN — INSULIN ASPART 2 UNITS: 100 INJECTION, SOLUTION INTRAVENOUS; SUBCUTANEOUS at 12:05

## 2023-05-30 RX ADMIN — SODIUM BICARBONATE 1300 MG: 650 TABLET ORAL at 08:05

## 2023-05-30 RX ADMIN — POTASSIUM BICARBONATE 50 MEQ: 978 TABLET, EFFERVESCENT ORAL at 02:05

## 2023-05-30 RX ADMIN — ZONISAMIDE 100 MG: 100 CAPSULE ORAL at 09:05

## 2023-05-30 RX ADMIN — LACTULOSE 30 G: 20 SOLUTION ORAL at 02:05

## 2023-05-30 RX ADMIN — ARIPIPRAZOLE 5 MG: 5 TABLET ORAL at 09:05

## 2023-05-30 RX ADMIN — PHYTONADIONE 10 MG: 10 INJECTION, EMULSION INTRAMUSCULAR; INTRAVENOUS; SUBCUTANEOUS at 09:05

## 2023-05-30 NOTE — SUBJECTIVE & OBJECTIVE
Interval History: No acute events overnight. Patient this AM remains unchanged, was able to endorse that she was previously a .  Did not demonstrate any pain upon palpation of her abdomen, arms, or legs.    Review of Systems   Unable to perform ROS: Mental status change   Objective:     Vital Signs (Most Recent):  Temp: 97.6 °F (36.4 °C) (05/30/23 1200)  Pulse: 87 (05/30/23 1400)  Resp: 17 (05/30/23 1200)  BP: 108/67 (05/30/23 1400)  SpO2: (!) 93 % (05/30/23 1400) Vital Signs (24h Range):  Temp:  [96.2 °F (35.7 °C)-98.5 °F (36.9 °C)] 97.6 °F (36.4 °C)  Pulse:  [64-87] 87  Resp:  [17-19] 17  SpO2:  [91 %-97 %] 93 %  BP: (105-130)/(57-67) 108/67     Weight: 59.9 kg (132 lb)  Body mass index is 24.14 kg/m².    Intake/Output Summary (Last 24 hours) at 5/30/2023 1633  Last data filed at 5/30/2023 1400  Gross per 24 hour   Intake 900 ml   Output --   Net 900 ml           Physical Exam  Constitutional:       General: She is not in acute distress.     Appearance: She is well-developed. She is ill-appearing and toxic-appearing.   HENT:      Head: Normocephalic and atraumatic.   Eyes:      General: Scleral icterus present.      Conjunctiva/sclera: Conjunctivae normal.      Pupils: Pupils are equal, round, and reactive to light.   Neck:      Thyroid: No thyromegaly.      Vascular: No JVD.   Cardiovascular:      Rate and Rhythm: Normal rate and regular rhythm.      Heart sounds: Normal heart sounds. No murmur heard.    No friction rub. No gallop.   Pulmonary:      Effort: Pulmonary effort is normal.      Breath sounds: Normal breath sounds. No wheezing or rales.   Abdominal:      General: Bowel sounds are normal. There is no distension.      Palpations: Abdomen is soft.      Tenderness: There is no abdominal tenderness. There is no guarding or rebound.   Musculoskeletal:         General: No tenderness. Normal range of motion.      Cervical back: Neck supple.      Right lower leg: No edema.      Left lower leg: No edema.       Comments: Edema in hands bilaterally   Skin:     General: Skin is warm and dry.      Coloration: Skin is not jaundiced.   Neurological:      Mental Status: She is lethargic, disoriented and confused.      Cranial Nerves: No cranial nerve deficit.   Psychiatric:         Cognition and Memory: Cognition is impaired. Memory is impaired.           Significant Labs: All pertinent labs within the past 24 hours have been reviewed.    Significant Imaging: I have reviewed all pertinent imaging results/findings within the past 24 hours.

## 2023-05-30 NOTE — PLAN OF CARE
"Plan of care reviewed with the patient at the beginning of the shift. Pt admitted with hepatic encephalopathy. Pt continues to be disoriented x4. Pt has been nonverbal for this shift with the exception of this morning, pt states "I was arrested today". Pt reoriented to being in the hospital but showed no evidence of learning orientation status. She was unable to follow commands but was was able to take meds in pudding. IVF infusing. Pt incontinent. Bmx2 overnight. On lactulose. Perineum excoriated. Thick barrier cream applied. Fall precautions maintained. She does not get oob. Minimal voluntary movements. Pt remained free from falls and injury this shift. Bed locked in lowest position, side rails up x2, call light within reach. Instructed pt to call for assistance as needed. Vitals stable. O2 at 5L nasal canula. Pt afebrile overnight. No acute issues overnight. Will continue to monitor.       "

## 2023-05-30 NOTE — PLAN OF CARE
Patient arouses to voice. Patient was able to answer a few questions today with the MD. Patient has been sleeping all day today. Patient received K replaced this morning. Saldaña catheter place. Patient received Lasix 40mg IVP and started a 24 hr urine collection. Neuro checks every 4 hours. 24 EEG order but has not been started. D5 infusion stopped per orders. Monitoring the patient's blood sugar AC&HS. Bed exit alarm is in use.

## 2023-05-30 NOTE — ASSESSMENT & PLAN NOTE
Patient with Hypoxic Respiratory failure which is Acute.  she is not on home oxygen. Supplemental oxygen was provided and noted-      .   Signs/symptoms of respiratory failure include- hypoxia. Contributing diagnoses includes - N/A Labs and images were reviewed. Patient Has not had a recent ABG. Will treat underlying causes and adjust management of respiratory failure as follows  - CXR personally reviewed, demonstrates bilateral interstitial opacities with possible blunting of costophrenic angle on R side. Concern for pulmonary edema given recent fluids she received for hypercalcemia vs multifocal PNA however patient does not appear infectious  - TTE with showing left sided pleural effusion, normal EF, normal CVP  - will give 40 mg IV Lasix today as well

## 2023-05-30 NOTE — PROGRESS NOTES
"CONSULTATION LIAISON PSYCHIATRY PROGRESS NOTE    Patient Name: Marely Hamilton  MRN: [id number]  Patient Class: IP- Inpatient  Admission Date: 5/28/2023  Attending Physician: Derick Winston MD      SUBJECTIVE:   Marely Hamilton is a 58 y.o. female with past psychiatric history of bipolar disorder, alcohol use disorder & past pertinent medical history of seizure disorder (on AEDs), ETOH cirrhosis presents to the ED/admitted to the hospital for Acute encephalopathy     Psychiatry consulted for hx of bipolar on lithium complicated by hypercalcemia at OSH, switched to abilify. Concern for persistent encephalopathy, unclear if psych in origin vs underlying HE exacerbating it"    Today, patient resting in bed. Improved attention, more awake and alert. Some spontaneous speech. Able to say she took her medications today as well as worked with PT/OT. Intermittent engagement during assessment. Unable to assess for sleep, appetite, mood.        OBJECTIVE:    Mental Status Exam:  General Appearance:  Appears older than stated age, disheveled, ecchymosis noted to bilateral hands/arms. Swelling of bilateral hands; scleral icterus  Behavior:  minimally engaged with assessment, somnolence  Involuntary Movements and Motor Activity: mild twitching of mouth, hand tremor  Gait and Station: unable to assess - patient lying down or seated  Speech and Language:  no spontaneous speech  Mood: unable to assess  Affect:  unable to assess  Thought Process and Associations: more linear, goal-directed  Thought Content and Perceptions:: Unable to Formally Assess  Sensorium and Orientation: delirious, somnolent; oriented only to self  Recent and Remote Memory: improving but Unable to  Formally Assess  Attention and Concentration: Unable to Formally Assess  Fund of Knowledge: Unable to Formally Assess  Insight: limited/partial awareness of illness - impaired due to encephalopathy  Judgment: impaired due to encephalopathy    CAM ICU positive? " yes      ASSESSMENT & RECOMMENDATIONS   Bipolar disorder  Alcohol use disorder  Encephalopathy, most likely Hepatic encephalopathy     PSYCH MEDICATIONS  Scheduled  - Continue Abilify 5mg daily. Can hold if condition worsens  PRN: none. Would limit risks of polypharmacy     DELIRIUM  DELIRIUM BEHAVIOR MANAGEMENT  PLEASE utilize CHEMICAL restraints with PRN meds first for agitation. Minimize use of PHYSICAL restraints OR have periods of being out of physical restraints if possible.  Keep window shades open and room lit during day and room dim at night in order to promote normal sleep-wake cycles  Encourage family at bedside. [place] patient often to situation, location, date.  Continue to Limit or Discontinue use of Narcotics, Benzos and Anti-cholinergic medications as they may worsen delirium.  Continue medical workup for causative etiology of Delirium.      Recommend repeat ammonia level as well as consider repeat EEG      RISK ASSESSMENT  NO NEED FOR PEC patient NOT in any imminent danger of hurting self or others and not gravely disabled.      FOLLOW UP  Will sign off at this time. Please contact on call service for any questions or concerns.      DISPOSITION - once medically cleared:   Defer to medical team    Please contact ON CALL psychiatry service (24/7) for any acute issues that may arise.    Dr. Priya Sheffield  CL Psychiatry  [facility]  5/30/2023 11:44 AM        --------------------------------------------------------------------------------------------------------------------------------------------------------------------------------------------------------------------------------------    CONTINUED OBJECTIVE clinical data & findings reviewed and noted for above decision making    Current Medications:   Scheduled Meds:    ARIPiprazole  5 mg Oral Daily    folic acid  1 mg Oral Daily    furosemide (LASIX) injection  40 mg Intravenous Once    lactulose  30 g Oral TID    levetiracetam   1,000 mg Oral BID    potassium bicarbonate  50 mEq Oral Q4H    rifAXIMin  550 mg Oral BID    senna-docusate 8.6-50 mg  1 tablet Oral BID    sodium bicarbonate  1,300 mg Oral BID    zonisamide  100 mg Oral Daily     PRN Meds: acetaminophen, aluminum-magnesium hydroxide-simethicone, dextrose 10%, dextrose 10%, dextrose, dextrose, glucagon (human recombinant), insulin aspart U-100, melatonin, naloxone, ondansetron, prochlorperazine, simethicone, sodium chloride 0.9%    Allergies:   Review of patient's allergies indicates:   Allergen Reactions    Sulfa (sulfonamide antibiotics) Rash    Codeine Nausea And Vomiting       Vitals  Vitals:    05/30/23 0800   BP: 108/61   Pulse: 72   Resp: 19   Temp: 96.2 °F (35.7 °C)       Labs/Imaging/Studies:  Recent Results (from the past 24 hour(s))   Echo    Collection Time: 05/29/23 12:00 PM   Result Value Ref Range    STJ 2.70 cm    AV mean gradient 5 mmHg    Ao peak delgado 1.44 m/s    Ao VTI 27.22 cm    IVS 0.80 0.6 - 1.1 cm    LA size 3.21 cm    Left Atrium Major Axis 3.69 cm    Left Atrium Minor Axis 3.88 cm    LVIDd 3.70 (A) 3.5 - 6.0 cm    LVIDs 2.24 2.1 - 4.0 cm    LVOT diameter 2.02 cm    LVOT peak VTI 23.64 cm    Posterior Wall 0.80 0.6 - 1.1 cm    MV Peak A Delgado 0.91 m/s    E wave deceleration time 374.59 msec    MV Peak E Delgado 0.78 m/s    RA Major Axis 3.92 cm    RA Width 2.46 cm    RVDD 3.09 cm    Sinus 3.20 cm    TAPSE 1.73 cm    LA WIDTH 3.08 cm    MV stenosis pressure 1/2 time 108.63 ms    LV Diastolic Volume 42.13 mL    LV Systolic Volume 16.90 mL    LVOT peak delgado 1.11 m/s    TDI LATERAL 0.09 m/s    TDI SEPTAL 0.08 m/s    LV LATERAL E/E' RATIO 8.67 m/s    LV SEPTAL E/E' RATIO 9.75 m/s    FS 39 %    LA volume 31.79 cm3    LV mass 82.32 g    Left Ventricle Relative Wall Thickness 0.43 cm    AV valve area 2.78 cm2    AV Velocity Ratio 0.77     AV index (prosthetic) 0.87     MV valve area p 1/2 method 2.03 cm2    E/A ratio 0.86     Mean e' 0.09 m/s    LVOT area 3.2 cm2    LVOT  stroke volume 75.72 cm3    AV peak gradient 8 mmHg    E/E' ratio 9.18 m/s    LV Systolic Volume Index 10.6 mL/m2    LV Diastolic Volume Index 26.33 mL/m2    LA Volume Index 19.9 mL/m2    LV Mass Index 51 g/m2    BSA 1.62 m2    Right Atrial Pressure (from IVC) 3 mmHg    EF 65 %   POCT glucose    Collection Time: 05/29/23  1:50 PM   Result Value Ref Range    POCT Glucose 203 (H) 70 - 110 mg/dL   Renal function panel    Collection Time: 05/29/23  2:13 PM   Result Value Ref Range    Glucose 211 (H) 70 - 110 mg/dL    Sodium 149 (H) 136 - 145 mmol/L    Potassium 3.0 (L) 3.5 - 5.1 mmol/L    Chloride 120 (H) 95 - 110 mmol/L    CO2 19 (L) 23 - 29 mmol/L    BUN 22 (H) 6 - 20 mg/dL    Calcium 8.2 (L) 8.7 - 10.5 mg/dL    Creatinine 0.8 0.5 - 1.4 mg/dL    Albumin 1.4 (L) 3.5 - 5.2 g/dL    Phosphorus 2.3 (L) 2.7 - 4.5 mg/dL    eGFR >60.0 >60 mL/min/1.73 m^2    Anion Gap 10 8 - 16 mmol/L   POCT glucose    Collection Time: 05/29/23  6:34 PM   Result Value Ref Range    POCT Glucose 189 (H) 70 - 110 mg/dL   Renal function panel    Collection Time: 05/29/23  8:19 PM   Result Value Ref Range    Glucose 179 (H) 70 - 110 mg/dL    Sodium 148 (H) 136 - 145 mmol/L    Potassium 3.4 (L) 3.5 - 5.1 mmol/L    Chloride 119 (H) 95 - 110 mmol/L    CO2 20 (L) 23 - 29 mmol/L    BUN 21 (H) 6 - 20 mg/dL    Calcium 8.1 (L) 8.7 - 10.5 mg/dL    Creatinine 0.8 0.5 - 1.4 mg/dL    Albumin 1.4 (L) 3.5 - 5.2 g/dL    Phosphorus 2.1 (L) 2.7 - 4.5 mg/dL    eGFR >60.0 >60 mL/min/1.73 m^2    Anion Gap 9 8 - 16 mmol/L   POCT glucose    Collection Time: 05/29/23  9:51 PM   Result Value Ref Range    POCT Glucose 188 (H) 70 - 110 mg/dL   PT/INR    Collection Time: 05/30/23  7:09 AM   Result Value Ref Range    Prothrombin Time 18.6 (H) 9.0 - 12.5 sec    INR 1.8 (H) 0.8 - 1.2   Comprehensive Metabolic Panel (CMP)    Collection Time: 05/30/23  7:09 AM   Result Value Ref Range    Sodium 144 136 - 145 mmol/L    Potassium 3.8 3.5 - 5.1 mmol/L    Chloride 119 (H) 95 - 110  mmol/L    CO2 18 (L) 23 - 29 mmol/L    Glucose 162 (H) 70 - 110 mg/dL    BUN 20 6 - 20 mg/dL    Creatinine 0.6 0.5 - 1.4 mg/dL    Calcium 8.0 (L) 8.7 - 10.5 mg/dL    Total Protein 4.1 (L) 6.0 - 8.4 g/dL    Albumin 1.4 (L) 3.5 - 5.2 g/dL    Total Bilirubin 3.4 (H) 0.1 - 1.0 mg/dL    Alkaline Phosphatase 185 (H) 55 - 135 U/L     (H) 10 - 40 U/L    ALT 81 (H) 10 - 44 U/L    Anion Gap 7 (L) 8 - 16 mmol/L    eGFR >60.0 >60 mL/min/1.73 m^2   Magnesium    Collection Time: 05/30/23  7:09 AM   Result Value Ref Range    Magnesium 1.7 1.6 - 2.6 mg/dL   Phosphorus    Collection Time: 05/30/23  7:09 AM   Result Value Ref Range    Phosphorus 2.0 (L) 2.7 - 4.5 mg/dL   CBC with Automated Differential    Collection Time: 05/30/23  7:09 AM   Result Value Ref Range    WBC 13.16 (H) 3.90 - 12.70 K/uL    RBC 2.35 (L) 4.00 - 5.40 M/uL    Hemoglobin 7.9 (L) 12.0 - 16.0 g/dL    Hematocrit 26.6 (L) 37.0 - 48.5 %     (H) 82 - 98 fL    MCH 33.6 (H) 27.0 - 31.0 pg    MCHC 29.7 (L) 32.0 - 36.0 g/dL    RDW 22.2 (H) 11.5 - 14.5 %    Platelets 56 (L) 150 - 450 K/uL    MPV 10.2 9.2 - 12.9 fL    Immature Granulocytes 0.5 0.0 - 0.5 %    Gran # (ANC) 9.9 (H) 1.8 - 7.7 K/uL    Immature Grans (Abs) 0.07 (H) 0.00 - 0.04 K/uL    Lymph # 1.9 1.0 - 4.8 K/uL    Mono # 1.0 0.3 - 1.0 K/uL    Eos # 0.3 0.0 - 0.5 K/uL    Baso # 0.02 0.00 - 0.20 K/uL    nRBC 0 0 /100 WBC    Gran % 75.0 (H) 38.0 - 73.0 %    Lymph % 14.7 (L) 18.0 - 48.0 %    Mono % 7.6 4.0 - 15.0 %    Eosinophil % 2.0 0.0 - 8.0 %    Basophil % 0.2 0.0 - 1.9 %    Platelet Estimate Decreased (A)     Aniso Slight     Poik Slight     David Cells Occasional     Differential Method Automated

## 2023-05-30 NOTE — PROGRESS NOTES
[facility] Medicine  Progress Note    Patient Name: Marely Hamilton  MRN: [id number]  Patient Class: IP- Inpatient   Admission Date: 5/28/2023  Length of Stay: 2 days  Attending Physician: Derick Winston MD  Primary Care Provider: Viktor Ross MD        Subjective:     Principal Problem:Acute encephalopathy        HPI:  Patient is a 58 y.o.female w/ PMHx of EtOH induced cirrhosis, seizure on AEDs, and bipolar disorder on lithium who initially presented to [facility] on 5/18/2023 with a primary complaint of altered mental status suspected to be from hepatic encephalopathy versus hypercalcemia who despite correction of her calcium and treatment with lactulose and rifaximin continued to be encephalopathic.  Given patient's persistent encephalopathy, there was a concern that she had uncontrolled hepatic encephalopathy and was transferred to [facility] for further evaluation.    During patient's hospitalization at [facility], she was noted to be hypercalcemic likely secondary to her lithium use for her bipolar disorder.  She was started on aripiprazole at that time.  Given her inability to follow commands, she had an NG-tube placed for her to safely accept medicines.  Upon arrival here, her NG tube was essentially dislodged and subsequently removed.  On bedside swallow assessment, patient was able to follow commands and swallow safely.  Additionally, on arrival patient had a small bowel movement.  When asked where she was, remained silent.  She later shared unprompted that she has a history of glaucoma.  Patient later stated her full name.  She was able to follow simple commands, but not 2 step commands.      Overview/Hospital Course:  No notes on file    Interval History: No acute events overnight. Patient this AM remains unchanged, was able to endorse that she was previously a .  Did not demonstrate any pain upon palpation of her abdomen, arms, or  legs.    Review of Systems   Unable to perform ROS: Mental status change   Objective:     Vital Signs (Most Recent):  Temp: 97.6 °F (36.4 °C) (05/30/23 1200)  Pulse: 87 (05/30/23 1400)  Resp: 17 (05/30/23 1200)  BP: 108/67 (05/30/23 1400)  SpO2: (!) 93 % (05/30/23 1400) Vital Signs (24h Range):  Temp:  [96.2 °F (35.7 °C)-98.5 °F (36.9 °C)] 97.6 °F (36.4 °C)  Pulse:  [64-87] 87  Resp:  [17-19] 17  SpO2:  [91 %-97 %] 93 %  BP: (105-130)/(57-67) 108/67     Weight: 59.9 kg (132 lb)  Body mass index is 24.14 kg/m².    Intake/Output Summary (Last 24 hours) at 5/30/2023 1633  Last data filed at 5/30/2023 1400  Gross per 24 hour   Intake 900 ml   Output --   Net 900 ml           Physical Exam  Constitutional:       General: She is not in acute distress.     Appearance: She is well-developed. She is ill-appearing and toxic-appearing.   HENT:      Head: Normocephalic and atraumatic.   Eyes:      General: Scleral icterus present.      Conjunctiva/sclera: Conjunctivae normal.      Pupils: Pupils are equal, round, and reactive to light.   Neck:      Thyroid: No thyromegaly.      Vascular: No JVD.   Cardiovascular:      Rate and Rhythm: Normal rate and regular rhythm.      Heart sounds: Normal heart sounds. No murmur heard.    No friction rub. No gallop.   Pulmonary:      Effort: Pulmonary effort is normal.      Breath sounds: Normal breath sounds. No wheezing or rales.   Abdominal:      General: Bowel sounds are normal. There is no distension.      Palpations: Abdomen is soft.      Tenderness: There is no abdominal tenderness. There is no guarding or rebound.   Musculoskeletal:         General: No tenderness. Normal range of motion.      Cervical back: Neck supple.      Right lower leg: No edema.      Left lower leg: No edema.      Comments: Edema in hands bilaterally   Skin:     General: Skin is warm and dry.      Coloration: Skin is not jaundiced.   Neurological:      Mental Status: She is lethargic, disoriented and confused.       Cranial Nerves: No cranial nerve deficit.   Psychiatric:         Cognition and Memory: Cognition is impaired. Memory is impaired.           Significant Labs: All pertinent labs within the past 24 hours have been reviewed.    Significant Imaging: I have reviewed all pertinent imaging results/findings within the past 24 hours.      Assessment/Plan:      * Acute encephalopathy  Patient presents as an outside hospital transfer for persistent encephalopathy.  She is been treated for her hypercalcemia as well as hepatic encephalopathy and continues to remain encephalopathic with concerns for persistent encephalopathy from a hepatic source.  While at the outside hospital, she was taken off of lithium and transitioned to aripiprazole for her bipolar disorder.  She had an EEG performed which showed diffuse slowing consistent with encephalopathy, however no epileptiform discharges.  Patient has a seizure history for which she was on Keppra and zonisamide.  On evaluation at [facility], patient able to follow one-step commands, oriented to self but not situation or time, no clonus in the lower extremities, but patient has fine motor tremors. MRI imaging at OSH unremarkable.  - exam continues to wax and wane  - Continue Keppra 1g q12h and zonisamide 100mg q12  - Continue lactulose (increased to 30g TID) and rifaximin  - q4h neuro checks  - Continue aripiprazole  - Lithium low  - PETH level pending      Hepatic cirrhosis  MELD-Na: 18 at 5/30/2023  7:09 AM  MELD: 18 at 5/30/2023  7:09 AM  Calculated from:  Serum Creatinine: 0.6 mg/dL (Using min of 1 mg/dL) at 5/30/2023  7:09 AM  Serum Sodium: 144 mmol/L (Using max of 137 mmol/L) at 5/30/2023  7:09 AM  Total Bilirubin: 3.4 mg/dL at 5/30/2023  7:09 AM  INR(ratio): 1.8 at 5/30/2023  7:09 AM    Hx of etoh cirrhosis, current elevated MELD driven by INR and tbili. Concerns for acute decompensation with HE. No evidence of volume overload. Last EGD in 2019 but unable to see  records.   - Continue lactulose and rifaximin for HE  - Given insterstitial opacities seen on imaging and O2 req, will give lasix 20mg IV x1. Patient had received fluids for her hypernatremia.  - Hepatology consulted for possible transplant evaluation, though history of BP and inconsistent use of lactulose for HE may indicate psychosocial barriers to being listed        Acute hypoxemic respiratory failure  Patient with Hypoxic Respiratory failure which is Acute.  she is not on home oxygen. Supplemental oxygen was provided and noted-      .   Signs/symptoms of respiratory failure include- hypoxia. Contributing diagnoses includes - N/A Labs and images were reviewed. Patient Has not had a recent ABG. Will treat underlying causes and adjust management of respiratory failure as follows  - CXR personally reviewed, demonstrates bilateral interstitial opacities with possible blunting of costophrenic angle on R side. Concern for pulmonary edema given recent fluids she received for hypercalcemia vs multifocal PNA however patient does not appear infectious  - TTE with showing left sided pleural effusion, normal EF, normal CVP  - will give 40 mg IV Lasix today as well    Hypernatremia  Down to 146 before transfer, initially elevated to 155 2/2 hypovolemia from hypercalcemia. Patient received NaCl infusions to help with hypercalcemia  - Na on transfer 150  - improved after D5 initiation, we will discontinue D5 today  - continue to monitor in the setting of future diuresis      Bipolar 1 disorder  Continue aripiprazole  - psychiatry following        Seizure  Continue keppra and zonisamide      Hepatic encephalopathy  Chronic, unstable  - Continuing lactulsoe and rifaximin as stated elsewhere      Macrocytic anemia  Chronic, stable  - Hgb 9.0  - CTM  - transfuse for <7        Thrombocytopenia  Chronic issue  - slightly worse today. No petechiae on exam  - Continue to monitor with daily cbc  - Transfuse for platelets <10k or <50k  w/ active bleeding        VTE Risk Mitigation (From admission, onward)         Ordered     IP VTE LOW RISK PATIENT  Once         05/28/23 1129     Place sequential compression device  Until discontinued         05/28/23 1129                Discharge Planning   BRAYAN: 6/7/2023     Code Status: Full Code   Is the patient medically ready for discharge?: No    Reason for patient still in hospital (select all that apply): Patient trending condition  Discharge Plan A:  (TBD)                  Derick Winston MD  Department of Hospital Medicine   [facility] - Transplant Stepdown

## 2023-05-30 NOTE — PLAN OF CARE
Problem: Occupational Therapy  Goal: Occupational Therapy Goal  Description: Goals to be met by: 6/6/23    Patient will increase functional independence with ADLs by performing:    Grooming while EOB with Moderate Assistance.  Sitting at edge of bed x10 minutes with Minimal Assistance.  Rolling to Bilateral with Minimal Assistance.   Supine to sit with Moderate Assistance.  Toilet transfer to bedside commode with Moderate Assistance.    Outcome: Ongoing, Progressing

## 2023-05-30 NOTE — ASSESSMENT & PLAN NOTE
Down to 146 before transfer, initially elevated to 155 2/2 hypovolemia from hypercalcemia. Patient received NaCl infusions to help with hypercalcemia  - Na on transfer 150  - improved after D5 initiation, we will discontinue D5 today  - continue to monitor in the setting of future diuresis

## 2023-05-30 NOTE — TREATMENT PLAN
Hepatology Treatment Plan    Marely Hamilton is a 58 y.o. female admitted to hospital 5/28/2023 (Hospital Day: 3) due to Acute encephalopathy.     Interval History  Patient seen by psychiatry yesterday with low suspicion for underlying psychiatric disorder contributing to encephalopathy. This morning at bedside, patient was awake and alert to name, but again non-verbal. Vital signs stable on 4-5L. Labs notable for stable leukocytosis (13), normal Na, normal Cr, and stable hyperbilirubinemia (3.4 from 3.1).     Objective  Temp:  [96.2 °F (35.7 °C)-98.5 °F (36.9 °C)] 97.6 °F (36.4 °C) (05/30 1200)  Pulse:  [64-79] 77 (05/30 1200)  BP: (105-130)/(55-66) 105/60 (05/30 1200)  Resp:  [16-19] 17 (05/30 1200)  SpO2:  [91 %-98 %] 92 % (05/30 1200)    General: Alert, non-verbal, no distress  Neurologic: Asterixis present; able to move all extremities on command.   Abdomen: Normoactive bowel sounds. Non-distended. Normal tympany. Soft. Non-tender. No peritoneal signs.    Laboratory    Lab Results   Component Value Date    WBC 13.16 (H) 05/30/2023    HGB 7.9 (L) 05/30/2023    HCT 26.6 (L) 05/30/2023     (H) 05/30/2023    PLT 56 (L) 05/30/2023       Lab Results   Component Value Date     05/30/2023    K 3.8 05/30/2023     (H) 05/30/2023    CO2 18 (L) 05/30/2023    BUN 20 05/30/2023    CREATININE 0.6 05/30/2023    CALCIUM 8.0 (L) 05/30/2023       Lab Results   Component Value Date    ALBUMIN 1.4 (L) 05/30/2023    ALT 81 (H) 05/30/2023     (H) 05/30/2023     (H) 06/02/2020    ALKPHOS 185 (H) 05/30/2023    BILITOT 3.4 (H) 05/30/2023       Lab Results   Component Value Date    INR 1.8 (H) 05/30/2023    INR 1.9 (H) 05/29/2023    INR 2.2 (H) 05/28/2023       MELD-Na: 18 at 5/30/2023  7:09 AM  MELD: 18 at 5/30/2023  7:09 AM  Calculated from:  Serum Creatinine: 0.6 mg/dL (Using min of 1 mg/dL) at 5/30/2023  7:09 AM  Serum Sodium: 144 mmol/L (Using max of 137 mmol/L) at 5/30/2023  7:09 AM  Total  Bilirubin: 3.4 mg/dL at 5/30/2023  7:09 AM  INR(ratio): 1.8 at 5/30/2023  7:09 AM      Assessment  This is a 58 year old female with PMH significant for ETOH cirrhosis (associated with ascites and encephalopathy; declined for transplant here in 12/2015 due to malnutrition), bipolar disorder (on Lithium), and unspecified seizure disorder (on AEDs) who was admitted to [facility] on 05/28 as a transfer from [facility] for management of recurrent hepatic encephalopathy following initial admission on 05/18 for acute onset of encephalopathy associated with UTI secondary to Proteus, hypernatremia, and hypercalcemia. She is status post treatment for UTI and electrolyte derangements without improvement in encephalopathy; CT and MRI brain unremarkable and EEG negative for seizures. She is status post evaluation by psychiatry here with low suspicion for psychiatric disorder contributing to encephalopathy. She remains intermittently verbal and following limited commands since admission here. On chart review, patient noted to have recurrent monthly admissions since 01/2023 for hepatic encephalopathy suspected from non-compliance with Lactulose, however her continued encephalopathy seems atypical for purely hepatic component given re-initiation of Lactulose.      Recommendations:      -Obtain ceruloplasmin and 24 hour urine copper to rule out Ronnie's disease.   -Obtain serum vitamin levels to rule out deficiency contributing to encephalopathy.   -Lactulose TID and Rifaximin BID; titrate Lactulose to 3-4 bowel movements daily. If not deemed safe for PO intake, then use Lactulose enemas.   -Okay to hold home diuretics without hypervolemia noted on exam.   -Follow-up repeat PETH.   -Minimize use of medications that may precipitate encephalopathy (e.g. opioids and benzos).   -CMP and INR daily.   -Despite MELD score of 20, no plans for re-initiation of transplant evaluation given encephalopathy limiting ability to confirm social  history with patient and ETOH cessation.     Thank you for involving us in the care of Marelychristi Hamilton. Please call with any additional concerns or questions.        Sarbjit Hollins MD, PGY-V  Gastroenterology Fellow  [facility]

## 2023-05-30 NOTE — PT/OT/SLP EVAL
"Physical Therapy Co-Evaluation and Co-Treatment    Patient Name:  Marely Hamilton   MRN:  [id number]    Co-evaluation and co-treatment performed for this visit due to suspected patient need for two skilled therapists to ensure patient and staff safety and to accommodate for patient activity tolerance/pain management   Recommendations:     Discharge Recommendations: nursing facility, skilled   Discharge Equipment Recommendations: to be determined by next level of care   Barriers to discharge: Increased level of assist and Decreased caregiver support    Assessment:     Marely Hamilton is a 58 y.o. female admitted with a medical diagnosis of Acute encephalopathy. She presents with the following impairments/functional limitations: weakness, impaired endurance, impaired self care skills, impaired functional mobility, gait instability, impaired balance, impaired cognition, decreased upper extremity function, decreased lower extremity function, decreased safety awareness. Patient tolerated session well however with limited ability to participate and answer questions.    Rehab Prognosis: Fair; patient would benefit from acute skilled PT services 3 x/week to address these deficits and reach maximum level of function.  Recent Surgery: * No surgery found *      Plan:     During this hospitalization, patient to be seen 3 x/week to address the identified rehab impairments via gait training, therapeutic activities, therapeutic exercises, neuromuscular re-education and progress toward the following goals:    Plan of Care Expires:  06/30/23    Subjective     Chief Complaint: None verbalized   Patient/Family Comments/Goals: "Oh shoot I must be in the hospital"  Pain/Comfort:  Pain Rating 1:  (unable to assess, no indicators of pain)    Patients cultural, spiritual, Spiritism conflicts given the current situation: no    Living Environment: Per chart, pt not answering questions  Living Environment: Patient lives alone (was living with " her mother who recently passed away, per chart pt is unaware) in a single story house with number of outside stair(s): 1 with no HR, walk-in shower, and built-in shower chair.  Prior Level of Function: Prior to admission, patient was modified independent with most ADLs (requires assist with bathing) using rolling walker for mobility and not driving and disabled.  Equipment Used at Home: walker, rolling, bedside commode.  DME owned (not currently used): none  Assistance Upon Discharge: unknown (had sitters 6-7 days/week for her mother)    Objective:     Communicated with nursing prior to session. Patient found HOB elevated with oxygen, pulse ox (continuous), pressure relief boots upon PT entry to room.    General Precautions: Standard, fall, aspiration  Orthopedic Precautions:N/A    Braces: N/A    Exams:  Cognitive Exam: Unable to assess orientation, pt grossly not answering questions, follows commands 25% of the time  RLE ROM: WFL, ankle to neutral with over pressure, resting in PF and inversion  RLE Strength:  1/5 dorsiflexion but 0/5 otherwise  LLE ROM: WFL, ankle to neutral with over pressure, resting in PF and inversion  LLE Strength:  1/5 dorsiflexion but 0/5 otherwise  Sensation: withdraws to noxious stimulus at great toes bilaterally    Functional Mobility:  Bed Mobility:    Supine to Sit: total assistance of 2 persons  Sit to Supine: total assistance of 2 persons  Transfers: Deferred due to poor sitting balance   Balance:   Static Sitting: Poor, able to maintain for 8 minute(s) with contact guard assistance progressing to total assistance, demonstrates posterior lean not able to correct with cuing  Dynamic Sitting: Poor: Patient unable to accept challenge or move without loss of balance, contact guard - total assistance    Therapeutic Activities and Exercises:  Patient educated on role of acute care PT and PT POC  Patient is not clear to transfer with RN/PCT    AM-PAC 6 CLICK MOBILITY  Total  Score:6     Patient left HOB elevated with all lines intact and call button in reach.    GOALS:   Multidisciplinary Problems       Physical Therapy Goals          Problem: Physical Therapy    Goal Priority Disciplines Outcome Goal Variances Interventions   Physical Therapy Goal     PT, PT/OT Ongoing, Progressing     Description: Goals to be met by: 2023     Patient will increase functional independence with mobility by performin. Supine to sit with moderate assistance  2. Sit to supine with moderate assistance  3. Sit to stand transfer with moderate assistance  4. Bed to chair transfer with maximal assistance using LRAD as needed  5. Gait  x 5 feet with maximal assistance using LRAD as needed  6. Sitting at edge of bed x8 minutes with Stand-by Assistance  7. Lower extremity exercise program x10 reps per handout, with independence                        History:     Past Medical History:   Diagnosis Date    Addiction to drug     Alcohol abuse     Alcohol abuse, in remission 6/15/2015    14.5 weeks ago; AA weekly    Anemia     Anxiety 6/15/2015    Behavioral problem     Bipolar disorder     Bipolar disorder in remission 6/15/2015    Cirrhosis, Laennec's 6/15/2015    Depression     Encounter for blood transfusion     Epistaxis 6/15/2015    Fatigue     Glaucoma     Hematuria     Hepatic encephalopathy 6/15/2015    Hepatic enlargement     History of psychiatric care     History of psychiatric hospitalization     History of seizure 6/15/2015    1    hx of intentional Tylenol overdose 6/15/2015    2005- situational and hx of bipolar    Hx of psychiatric care     Macrocytic anemia 2015    6 units PRBC since 2015    Jeana     Osteoarthritis     Other ascites 6/15/2015    Psychiatric exam requested by authority     Psychiatric problem     Psychosis 2019    Renal disorder     Seizures     Self-harming behavior     Suicide attempt     Therapy     Thrombocytopenia 6/15/2015       Past Surgical History:    Procedure Laterality Date    COSMETIC SURGERY      ESOPHAGOGASTRODUODENOSCOPY         Time Tracking:     PT Received On: 05/30/23  PT Start Time: 0916     PT Stop Time: 0932  PT Total Time (min): 16 min     Billable Minutes: Evaluation 8 min Neuromuscular Re-education 8 min      05/30/2023

## 2023-05-30 NOTE — PT/OT/SLP EVAL
Occupational Therapy   Evaluation    Name: Marely Hamilton  MRN: [id number]  Admitting Diagnosis: Acute encephalopathy  Recent Surgery: * No surgery found *      Recommendations:     Discharge Recommendations: nursing facility, skilled  Discharge Equipment Recommendations:  none  Barriers to discharge:  Decreased caregiver support    Assessment:     Marely Hamilton is a 58 y.o. female with a medical diagnosis of Acute encephalopathy. Pt minimally verbal, answering no questions and makes minimal eye contact. Command-following is poor at this time due to AMS. Performance deficits affecting function: weakness, impaired endurance, impaired cognition, decreased ROM, abnormal tone, decreased coordination, decreased upper extremity function, impaired self care skills, impaired functional mobility, decreased lower extremity function, decreased safety awareness, gait instability, impaired balance.      Rehab Prognosis: Fair; patient would benefit from acute skilled OT services to address these deficits and reach maximum level of function.       Plan:     Patient to be seen 3 x/week to address the above listed problems via self-care/home management, therapeutic activities, therapeutic exercises, neuromuscular re-education  Plan of Care Expires: 06/29/23  Plan of Care Reviewed with: patient    Subjective     Occupational Profile: Pt was originally admited to [facility] for AMS on 5/18.  Living Environment: Patient lives alone (was living with her mother who recently passed away. Per chart, pt is unaware) in a single story house with number of outside stair(s): 1 with no HR, walk-in shower, and built-in shower chair.  Prior Level of Function: Prior to admission, patient was modified independent with most ADLs (requires assist with bathing) using rolling walker for mobility and not driving and disabled.  Equipment Used at Home: walker, rolling, bedside commode.  DME owned (not currently used): none  Assistance Upon Discharge:  unknown (had sitters 6-7 days/week for her mother)       Pain/Comfort:  Pain Rating 1: 0/10    Patients cultural, spiritual, Episcopal conflicts given the current situation:      Objective:     Communicated with: rn prior to session.  Patient found supine with oxygen, pulse ox (continuous) upon OT entry to room.    General Precautions: Standard, fall  Orthopedic Precautions:    Braces:    Respiratory Status: Nasal cannula, flow 3 L/min    Occupational Performance:    Bed Mobility:    Patient completed Scooting/Bridging with total assistance  Patient completed Supine to Sit with total assistance and 2 persons  Patient completed Sit to Supine with total assistance and 2 persons    Activities of Daily Living:  Grooming: total assistance due to not initiating task on command.    Cognitive/Visual Perceptual:  Cognitive/Psychosocial Skills:     -       Oriented to: Not answering orientation questions.   -       Follows Commands/attention:Inattentive and Easily distracted  -       Safety awareness/insight to disability: impaired     Physical Exam:  SaT EOB 8 minutes requiring CGA initially to Max A for balance due to falling posteriorly.  Unable to assess UE AROM/MMT due to poor command-following. PROM WFL with minimal tone noted.    AMPA 6 Click ADL:  AMPAC Total Score: 6    Treatment & Education:  UE PROM assessed  Bed mobility assessment  Sitting balance assessment    Patient left supine with all lines intact and call button in reach    GOALS:   Multidisciplinary Problems       Occupational Therapy Goals          Problem: Occupational Therapy    Goal Priority Disciplines Outcome Interventions   Occupational Therapy Goal     OT, PT/OT Ongoing, Progressing    Description: Goals to be met by: 6/6/23    Patient will increase functional independence with ADLs by performing:    Grooming while EOB with Moderate Assistance.  Sitting at edge of bed x10 minutes with Minimal Assistance.  Rolling to Bilateral with Minimal  Assistance.   Supine to sit with Moderate Assistance.  Toilet transfer to bedside commode with Moderate Assistance.                         History:     Past Medical History:   Diagnosis Date    Addiction to drug     Alcohol abuse     Alcohol abuse, in remission 6/15/2015    14.5 weeks ago; AA weekly    Anemia     Anxiety 6/15/2015    Behavioral problem     Bipolar disorder     Bipolar disorder in remission 6/15/2015    Cirrhosis, Laennec's 6/15/2015    Depression     Encounter for blood transfusion     Epistaxis 6/15/2015    Fatigue     Glaucoma     Hematuria     Hepatic encephalopathy 6/15/2015    Hepatic enlargement     History of psychiatric care     History of psychiatric hospitalization     History of seizure 6/15/2015    1    hx of intentional Tylenol overdose 6/15/2015    2005- situational and hx of bipolar    Hx of psychiatric care     Macrocytic anemia 9/18/2015    6 units PRBC since June 2015    Jeana     Osteoarthritis     Other ascites 6/15/2015    Psychiatric exam requested by authority     Psychiatric problem     Psychosis 9/26/2019    Renal disorder     Seizures     Self-harming behavior     Suicide attempt     Therapy     Thrombocytopenia 6/15/2015         Past Surgical History:   Procedure Laterality Date    COSMETIC SURGERY      ESOPHAGOGASTRODUODENOSCOPY         Time Tracking:     OT Date of Treatment: 05/30/23  OT Start Time: 0916  OT Stop Time: 0932  OT Total Time (min): 16 min    Billable Minutes:Evaluation 8  Therapeutic Activity 8    5/30/2023

## 2023-05-30 NOTE — ASSESSMENT & PLAN NOTE
Patient presents as an outside hospital transfer for persistent encephalopathy.  She is been treated for her hypercalcemia as well as hepatic encephalopathy and continues to remain encephalopathic with concerns for persistent encephalopathy from a hepatic source.  While at the outside hospital, she was taken off of lithium and transitioned to aripiprazole for her bipolar disorder.  She had an EEG performed which showed diffuse slowing consistent with encephalopathy, however no epileptiform discharges.  Patient has a seizure history for which she was on Keppra and zonisamide.  On evaluation at [facility], patient able to follow one-step commands, oriented to self but not situation or time, no clonus in the lower extremities, but patient has fine motor tremors. MRI imaging at OSH unremarkable.  - exam continues to wax and wane  - Continue Keppra 1g q12h and zonisamide 100mg q12  - Continue lactulose (increased to 30g TID) and rifaximin  - q4h neuro checks  - Continue aripiprazole  - Lithium low  - PETH level pending

## 2023-05-30 NOTE — ASSESSMENT & PLAN NOTE
MELD-Na: 18 at 5/30/2023  7:09 AM  MELD: 18 at 5/30/2023  7:09 AM  Calculated from:  Serum Creatinine: 0.6 mg/dL (Using min of 1 mg/dL) at 5/30/2023  7:09 AM  Serum Sodium: 144 mmol/L (Using max of 137 mmol/L) at 5/30/2023  7:09 AM  Total Bilirubin: 3.4 mg/dL at 5/30/2023  7:09 AM  INR(ratio): 1.8 at 5/30/2023  7:09 AM    Hx of etoh cirrhosis, current elevated MELD driven by INR and tbili. Concerns for acute decompensation with HE. No evidence of volume overload. Last EGD in 2019 but unable to see records.   - Continue lactulose and rifaximin for HE  - Given insterstitial opacities seen on imaging and O2 req, will give lasix 20mg IV x1. Patient had received fluids for her hypernatremia.  - Hepatology consulted for possible transplant evaluation, though history of BP and inconsistent use of lactulose for HE may indicate psychosocial barriers to being listed

## 2023-05-31 LAB
ACANTHOCYTES BLD QL SMEAR: PRESENT
ALBUMIN SERPL BCP-MCNC: 1.5 G/DL (ref 3.5–5.2)
ALP SERPL-CCNC: 206 U/L (ref 55–135)
ALT SERPL W/O P-5'-P-CCNC: 83 U/L (ref 10–44)
AMMONIA PLAS-SCNC: 48 UMOL/L (ref 10–50)
ANION GAP SERPL CALC-SCNC: 9 MMOL/L (ref 8–16)
ANISOCYTOSIS BLD QL SMEAR: SLIGHT
AST SERPL-CCNC: 101 U/L (ref 10–40)
BASOPHILS # BLD AUTO: 0.02 K/UL (ref 0–0.2)
BASOPHILS NFR BLD: 0.1 % (ref 0–1.9)
BILIRUB SERPL-MCNC: 3.9 MG/DL (ref 0.1–1)
BLD GP AB SCN CELLS X3 SERPL QL: NORMAL
BUN SERPL-MCNC: 20 MG/DL (ref 6–20)
BURR CELLS BLD QL SMEAR: ABNORMAL
CALCIUM SERPL-MCNC: 8 MG/DL (ref 8.7–10.5)
CERULOPLASMIN SERPL-MCNC: 18 MG/DL (ref 15–45)
CHLORIDE SERPL-SCNC: 118 MMOL/L (ref 95–110)
CO2 SERPL-SCNC: 20 MMOL/L (ref 23–29)
CREAT SERPL-MCNC: 0.7 MG/DL (ref 0.5–1.4)
DACRYOCYTES BLD QL SMEAR: ABNORMAL
DIFFERENTIAL METHOD: ABNORMAL
DOHLE BOD BLD QL SMEAR: PRESENT
EOSINOPHIL # BLD AUTO: 0.1 K/UL (ref 0–0.5)
EOSINOPHIL NFR BLD: 0.7 % (ref 0–8)
ERYTHROCYTE [DISTWIDTH] IN BLOOD BY AUTOMATED COUNT: 22.6 % (ref 11.5–14.5)
EST. GFR  (NO RACE VARIABLE): >60 ML/MIN/1.73 M^2
FOLATE SERPL-MCNC: 16.2 NG/ML (ref 4–24)
GLUCOSE SERPL-MCNC: 120 MG/DL (ref 70–110)
HAPTOGLOB SERPL-MCNC: <10 MG/DL (ref 30–250)
HCT VFR BLD AUTO: 28.3 % (ref 37–48.5)
HGB BLD-MCNC: 8.9 G/DL (ref 12–16)
HYPOCHROMIA BLD QL SMEAR: ABNORMAL
IMM GRANULOCYTES # BLD AUTO: 0.08 K/UL (ref 0–0.04)
IMM GRANULOCYTES NFR BLD AUTO: 0.6 % (ref 0–0.5)
INR PPP: 1.7 (ref 0.8–1.2)
LDH SERPL L TO P-CCNC: 426 U/L (ref 110–260)
LYMPHOCYTES # BLD AUTO: 1.7 K/UL (ref 1–4.8)
LYMPHOCYTES NFR BLD: 12.7 % (ref 18–48)
MAGNESIUM SERPL-MCNC: 1.6 MG/DL (ref 1.6–2.6)
MCH RBC QN AUTO: 35 PG (ref 27–31)
MCHC RBC AUTO-ENTMCNC: 31.4 G/DL (ref 32–36)
MCV RBC AUTO: 111 FL (ref 82–98)
MONOCYTES # BLD AUTO: 1.1 K/UL (ref 0.3–1)
MONOCYTES NFR BLD: 7.6 % (ref 4–15)
NEUTROPHILS # BLD AUTO: 10.8 K/UL (ref 1.8–7.7)
NEUTROPHILS NFR BLD: 78.3 % (ref 38–73)
NRBC BLD-RTO: 0 /100 WBC
OVALOCYTES BLD QL SMEAR: ABNORMAL
PHOSPHATE SERPL-MCNC: 2.5 MG/DL (ref 2.7–4.5)
PLATELET # BLD AUTO: 57 K/UL (ref 150–450)
PLATELET BLD QL SMEAR: ABNORMAL
PMV BLD AUTO: 10.9 FL (ref 9.2–12.9)
POCT GLUCOSE: 170 MG/DL (ref 70–110)
POCT GLUCOSE: 193 MG/DL (ref 70–110)
POCT GLUCOSE: 271 MG/DL (ref 70–110)
POIKILOCYTOSIS BLD QL SMEAR: SLIGHT
POLYCHROMASIA BLD QL SMEAR: ABNORMAL
POTASSIUM SERPL-SCNC: 4.7 MMOL/L (ref 3.5–5.1)
PROT SERPL-MCNC: 4.2 G/DL (ref 6–8.4)
PROTHROMBIN TIME: 18.1 SEC (ref 9–12.5)
PTH RELATED PROT SERPL-SCNC: 0.5 PMOL/L
RBC # BLD AUTO: 2.54 M/UL (ref 4–5.4)
SCHISTOCYTES BLD QL SMEAR: ABNORMAL
SCHISTOCYTES BLD QL SMEAR: PRESENT
SMUDGE CELLS BLD QL SMEAR: PRESENT
SODIUM SERPL-SCNC: 147 MMOL/L (ref 136–145)
SPHEROCYTES BLD QL SMEAR: ABNORMAL
TOXIC GRANULES BLD QL SMEAR: PRESENT
VIT B12 SERPL-MCNC: >2000 PG/ML (ref 210–950)
WBC # BLD AUTO: 13.75 K/UL (ref 3.9–12.7)

## 2023-05-31 PROCEDURE — 95700 EEG CONT REC W/VID EEG TECH: CPT

## 2023-05-31 PROCEDURE — 86850 RBC ANTIBODY SCREEN: CPT | Performed by: STUDENT IN AN ORGANIZED HEALTH CARE EDUCATION/TRAINING PROGRAM

## 2023-05-31 PROCEDURE — 25000003 PHARM REV CODE 250: Performed by: STUDENT IN AN ORGANIZED HEALTH CARE EDUCATION/TRAINING PROGRAM

## 2023-05-31 PROCEDURE — 99900035 HC TECH TIME PER 15 MIN (STAT)

## 2023-05-31 PROCEDURE — 99233 PR SUBSEQUENT HOSPITAL CARE,LEVL III: ICD-10-PCS | Mod: ,,, | Performed by: STUDENT IN AN ORGANIZED HEALTH CARE EDUCATION/TRAINING PROGRAM

## 2023-05-31 PROCEDURE — 99233 SBSQ HOSP IP/OBS HIGH 50: CPT | Mod: ,,, | Performed by: STUDENT IN AN ORGANIZED HEALTH CARE EDUCATION/TRAINING PROGRAM

## 2023-05-31 PROCEDURE — 84207 ASSAY OF VITAMIN B-6: CPT | Performed by: STUDENT IN AN ORGANIZED HEALTH CARE EDUCATION/TRAINING PROGRAM

## 2023-05-31 PROCEDURE — 82140 ASSAY OF AMMONIA: CPT | Performed by: STUDENT IN AN ORGANIZED HEALTH CARE EDUCATION/TRAINING PROGRAM

## 2023-05-31 PROCEDURE — 95714 VEEG EA 12-26 HR UNMNTR: CPT

## 2023-05-31 PROCEDURE — 95720 EEG PHY/QHP EA INCR W/VEEG: CPT | Mod: ,,, | Performed by: PSYCHIATRY & NEUROLOGY

## 2023-05-31 PROCEDURE — 82525 ASSAY OF COPPER: CPT | Performed by: STUDENT IN AN ORGANIZED HEALTH CARE EDUCATION/TRAINING PROGRAM

## 2023-05-31 PROCEDURE — 83010 ASSAY OF HAPTOGLOBIN QUANT: CPT | Performed by: STUDENT IN AN ORGANIZED HEALTH CARE EDUCATION/TRAINING PROGRAM

## 2023-05-31 PROCEDURE — 83735 ASSAY OF MAGNESIUM: CPT | Performed by: STUDENT IN AN ORGANIZED HEALTH CARE EDUCATION/TRAINING PROGRAM

## 2023-05-31 PROCEDURE — 82390 ASSAY OF CERULOPLASMIN: CPT | Performed by: STUDENT IN AN ORGANIZED HEALTH CARE EDUCATION/TRAINING PROGRAM

## 2023-05-31 PROCEDURE — 85025 COMPLETE CBC W/AUTO DIFF WBC: CPT | Performed by: STUDENT IN AN ORGANIZED HEALTH CARE EDUCATION/TRAINING PROGRAM

## 2023-05-31 PROCEDURE — 84591 ASSAY OF NOS VITAMIN: CPT | Performed by: STUDENT IN AN ORGANIZED HEALTH CARE EDUCATION/TRAINING PROGRAM

## 2023-05-31 PROCEDURE — 20600001 HC STEP DOWN PRIVATE ROOM

## 2023-05-31 PROCEDURE — 83615 LACTATE (LD) (LDH) ENZYME: CPT | Performed by: STUDENT IN AN ORGANIZED HEALTH CARE EDUCATION/TRAINING PROGRAM

## 2023-05-31 PROCEDURE — 27000221 HC OXYGEN, UP TO 24 HOURS

## 2023-05-31 PROCEDURE — 82607 VITAMIN B-12: CPT | Performed by: STUDENT IN AN ORGANIZED HEALTH CARE EDUCATION/TRAINING PROGRAM

## 2023-05-31 PROCEDURE — 85610 PROTHROMBIN TIME: CPT | Performed by: STUDENT IN AN ORGANIZED HEALTH CARE EDUCATION/TRAINING PROGRAM

## 2023-05-31 PROCEDURE — 25000003 PHARM REV CODE 250: Performed by: HOSPITALIST

## 2023-05-31 PROCEDURE — 84425 ASSAY OF VITAMIN B-1: CPT | Performed by: STUDENT IN AN ORGANIZED HEALTH CARE EDUCATION/TRAINING PROGRAM

## 2023-05-31 PROCEDURE — 80053 COMPREHEN METABOLIC PANEL: CPT | Performed by: STUDENT IN AN ORGANIZED HEALTH CARE EDUCATION/TRAINING PROGRAM

## 2023-05-31 PROCEDURE — 63600175 PHARM REV CODE 636 W HCPCS: Performed by: HOSPITALIST

## 2023-05-31 PROCEDURE — 36415 COLL VENOUS BLD VENIPUNCTURE: CPT | Performed by: STUDENT IN AN ORGANIZED HEALTH CARE EDUCATION/TRAINING PROGRAM

## 2023-05-31 PROCEDURE — 82746 ASSAY OF FOLIC ACID SERUM: CPT | Performed by: STUDENT IN AN ORGANIZED HEALTH CARE EDUCATION/TRAINING PROGRAM

## 2023-05-31 PROCEDURE — 84100 ASSAY OF PHOSPHORUS: CPT | Performed by: STUDENT IN AN ORGANIZED HEALTH CARE EDUCATION/TRAINING PROGRAM

## 2023-05-31 PROCEDURE — 95720 PR EEG, W/VIDEO, CONT RECORD, I&R, >12<26 HRS: ICD-10-PCS | Mod: ,,, | Performed by: PSYCHIATRY & NEUROLOGY

## 2023-05-31 PROCEDURE — 63600175 PHARM REV CODE 636 W HCPCS: Performed by: STUDENT IN AN ORGANIZED HEALTH CARE EDUCATION/TRAINING PROGRAM

## 2023-05-31 RX ORDER — FUROSEMIDE 10 MG/ML
80 INJECTION INTRAMUSCULAR; INTRAVENOUS ONCE
Status: COMPLETED | OUTPATIENT
Start: 2023-05-31 | End: 2023-05-31

## 2023-05-31 RX ORDER — LEVETIRACETAM 100 MG/ML
1500 SOLUTION ORAL 2 TIMES DAILY
Status: DISCONTINUED | OUTPATIENT
Start: 2023-06-01 | End: 2023-06-01

## 2023-05-31 RX ORDER — LEVETIRACETAM 500 MG/5ML
1000 INJECTION, SOLUTION, CONCENTRATE INTRAVENOUS ONCE
Status: COMPLETED | OUTPATIENT
Start: 2023-05-31 | End: 2023-05-31

## 2023-05-31 RX ORDER — ZONISAMIDE 100 MG/1
200 CAPSULE ORAL DAILY
Status: DISCONTINUED | OUTPATIENT
Start: 2023-06-01 | End: 2023-06-02

## 2023-05-31 RX ORDER — THIAMINE HCL 100 MG
100 TABLET ORAL DAILY
Status: DISCONTINUED | OUTPATIENT
Start: 2023-05-31 | End: 2023-06-02

## 2023-05-31 RX ORDER — ZONISAMIDE 100 MG/1
100 CAPSULE ORAL ONCE
Status: COMPLETED | OUTPATIENT
Start: 2023-05-31 | End: 2023-05-31

## 2023-05-31 RX ORDER — LORAZEPAM 2 MG/ML
2 INJECTION INTRAMUSCULAR ONCE
Status: COMPLETED | OUTPATIENT
Start: 2023-05-31 | End: 2023-05-31

## 2023-05-31 RX ADMIN — SODIUM BICARBONATE 1300 MG: 650 TABLET ORAL at 10:05

## 2023-05-31 RX ADMIN — LACTULOSE 30 G: 20 SOLUTION ORAL at 10:05

## 2023-05-31 RX ADMIN — SODIUM BICARBONATE 1300 MG: 650 TABLET ORAL at 07:05

## 2023-05-31 RX ADMIN — ARIPIPRAZOLE 5 MG: 5 TABLET ORAL at 10:05

## 2023-05-31 RX ADMIN — INSULIN ASPART 1 UNITS: 100 INJECTION, SOLUTION INTRAVENOUS; SUBCUTANEOUS at 09:05

## 2023-05-31 RX ADMIN — LACTULOSE 30 G: 20 SOLUTION ORAL at 07:05

## 2023-05-31 RX ADMIN — FOLIC ACID 1 MG: 1 TABLET ORAL at 10:05

## 2023-05-31 RX ADMIN — LEVETIRACETAM 1000 MG: 500 SOLUTION ORAL at 07:05

## 2023-05-31 RX ADMIN — FUROSEMIDE 80 MG: 10 INJECTION, SOLUTION INTRAMUSCULAR; INTRAVENOUS at 03:05

## 2023-05-31 RX ADMIN — RIFAXIMIN 550 MG: 550 TABLET ORAL at 10:05

## 2023-05-31 RX ADMIN — RIFAXIMIN 550 MG: 550 TABLET ORAL at 07:05

## 2023-05-31 RX ADMIN — ZONISAMIDE 100 MG: 100 CAPSULE ORAL at 10:05

## 2023-05-31 RX ADMIN — LEVETIRACETAM 1000 MG: 500 SOLUTION ORAL at 10:05

## 2023-05-31 RX ADMIN — Medication 100 MG: at 03:05

## 2023-05-31 RX ADMIN — LACTULOSE 30 G: 20 SOLUTION ORAL at 03:05

## 2023-05-31 RX ADMIN — LORAZEPAM 2 MG: 2 INJECTION INTRAMUSCULAR; INTRAVENOUS at 10:05

## 2023-05-31 RX ADMIN — LEVETIRACETAM 1000 MG: 100 INJECTION, SOLUTION INTRAVENOUS at 10:05

## 2023-05-31 NOTE — SUBJECTIVE & OBJECTIVE
Past Medical History:   Diagnosis Date    Addiction to drug     Alcohol abuse     Alcohol abuse, in remission 6/15/2015    14.5 weeks ago; AA weekly    Anemia     Anxiety 6/15/2015    Behavioral problem     Bipolar disorder     Bipolar disorder in remission 6/15/2015    Cirrhosis, Laennec's 6/15/2015    Depression     Encounter for blood transfusion     Epistaxis 6/15/2015    Fatigue     Glaucoma     Hematuria     Hepatic encephalopathy 6/15/2015    Hepatic enlargement     History of psychiatric care     History of psychiatric hospitalization     History of seizure 6/15/2015    1    hx of intentional Tylenol overdose 6/15/2015    2005- situational and hx of bipolar    Hx of psychiatric care     Macrocytic anemia 9/18/2015    6 units PRBC since June 2015    Jeana     Osteoarthritis     Other ascites 6/15/2015    Psychiatric exam requested by authority     Psychiatric problem     Psychosis 9/26/2019    Renal disorder     Seizures     Self-harming behavior     Suicide attempt     Therapy     Thrombocytopenia 6/15/2015       Past Surgical History:   Procedure Laterality Date    COSMETIC SURGERY      ESOPHAGOGASTRODUODENOSCOPY         Review of patient's allergies indicates:   Allergen Reactions    Sulfa (sulfonamide antibiotics) Rash    Codeine Nausea And Vomiting       Current Neurological Medications: as detailed below     No current facility-administered medications on file prior to encounter.     Current Outpatient Medications on File Prior to Encounter   Medication Sig    folic acid (FOLVITE) 1 MG tablet Take 1 tablet (1 mg total) by mouth once daily.    hydrOXYzine (ATARAX) 50 MG tablet Take 50 mg by mouth every evening.    lactulose (CHRONULAC) 20 gram/30 mL Soln 45 mLs (30 g total) by Per NG tube route 2 (two) times daily.    levETIRAcetam (KEPPRA) 1000 MG tablet Take 1,000 mg by mouth 2 (two) times daily.    propranoloL (INDERAL) 20 MG tablet Take 20 mg by mouth once daily.    rifAXIMin (XIFAXAN) 550 mg Tab  Take 1 tablet (550 mg total) by mouth 2 (two) times daily.    zonisamide (ZONEGRAN) 100 MG Cap Take 100 mg by mouth once daily.     Family History       Problem Relation (Age of Onset)    Alcohol abuse Father, Sister, Paternal Grandfather    Bipolar disorder Father    Hypertension Mother    Suicide Father          Tobacco Use    Smoking status: Never    Smokeless tobacco: Never   Substance and Sexual Activity    Alcohol use: Not Currently     Comment: hx of ETOH abuse with cirrhosis of liver    Drug use: No    Sexual activity: Not Currently     Review of Systems   Unable to perform ROS: Mental status change   Constitutional:  Negative for fever.   HENT:  Positive for trouble swallowing and voice change.    Gastrointestinal:  Negative for nausea and vomiting.   Skin:  Positive for color change (bruises).   Neurological:  Positive for seizures.   Psychiatric/Behavioral:  Positive for confusion.         Hypoactive      Objective:     Vital Signs (Most Recent):  Temp: 97.6 °F (36.4 °C) (05/31/23 0900)  Pulse: 85 (05/31/23 0900)  Resp: 15 (05/31/23 0900)  BP: (!) 109/57 (05/31/23 0900)  SpO2: 100 % (05/31/23 0900) Vital Signs (24h Range):  Temp:  [97.5 °F (36.4 °C)-98.1 °F (36.7 °C)] 97.6 °F (36.4 °C)  Pulse:  [75-88] 85  Resp:  [15-18] 15  SpO2:  [93 %-100 %] 100 %  BP: ()/(57-65) 109/57     Weight: 59.9 kg (132 lb)  Body mass index is 24.14 kg/m².     Physical Exam  Constitutional:       Appearance: She is ill-appearing.      Comments: Somnolent   HENT:      Head: Normocephalic.   Eyes:      Extraocular Movements: Extraocular movements intact and EOM normal.      Pupils: Pupils are equal, round, and reactive to light.   Cardiovascular:      Rate and Rhythm: Normal rate.   Pulmonary:      Effort: No respiratory distress.   Musculoskeletal:      Comments: Bl upper extremity edema at hands   Skin:     Findings: Bruising (discoloration at hands with hematomas) present.   Neurological:      Deep Tendon Reflexes:       Reflex Scores:       Tricep reflexes are 1+ on the right side and 1+ on the left side.       Patellar reflexes are 1+ on the right side and 1+ on the left side.       NEUROLOGICAL EXAMINATION:     MENTAL STATUS   Oriented to person.   Disoriented to place.   Disoriented to time.   Follows 1 step commands.   Attention: decreased. Concentration: decreased.   Level of consciousness: alert  Unable to name object. Unable to repeat.        Minimally verbal answering y/n questions but not appropriately nor consistently.   Unable to keep eyes open, able to say her name      CRANIAL NERVES     CN III, IV, VI   Pupils are equal, round, and reactive to light.  Extraocular motions are normal.     CN VII   Facial expression full, symmetric.     MOTOR EXAM   Muscle bulk: decreased  Right arm pronator drift: absent  Left arm pronator drift: absent       Anti gravity in bl upper extremities   Unable to lift bl lower extremities of bed but seem effort dependent/not understanding question      REFLEXES     Reflexes   Right triceps: 1+  Left triceps: 1+  Right patellar: 1+  Left patellar: 1+    SENSORY EXAM   Light touch normal.     Significant Labs: CBC:   Recent Labs   Lab 05/30/23  0709 05/31/23  0708   WBC 13.16* 13.75*   HGB 7.9* 8.9*   HCT 26.6* 28.3*   PLT 56* 57*     CMP:   Recent Labs   Lab 05/29/23 2019 05/30/23  0709 05/31/23  0708   * 162* 120*   * 144 147*   K 3.4* 3.8 4.7   * 119* 118*   CO2 20* 18* 20*   BUN 21* 20 20   CREATININE 0.8 0.6 0.7   CALCIUM 8.1* 8.0* 8.0*   MG  --  1.7 1.6   PROT  --  4.1* 4.2*   ALBUMIN 1.4* 1.4* 1.5*   BILITOT  --  3.4* 3.9*   ALKPHOS  --  185* 206*   AST  --  106* 101*   ALT  --  81* 83*   ANIONGAP 9 7* 9     All pertinent lab results from the past 24 hours have been reviewed.    Significant Imaging: I have reviewed and interpreted all pertinent imaging results/findings within the past 24 hours.

## 2023-05-31 NOTE — ASSESSMENT & PLAN NOTE
MELD-Na: 18 at 5/31/2023  7:08 AM  MELD: 18 at 5/31/2023  7:08 AM  Calculated from:  Serum Creatinine: 0.7 mg/dL (Using min of 1 mg/dL) at 5/31/2023  7:08 AM  Serum Sodium: 147 mmol/L (Using max of 137 mmol/L) at 5/31/2023  7:08 AM  Total Bilirubin: 3.9 mg/dL at 5/31/2023  7:08 AM  INR(ratio): 1.7 at 5/31/2023  7:08 AM    Hx of etoh cirrhosis, current elevated MELD driven by INR and tbili. Concerns for acute decompensation with HE. No evidence of volume overload. Last EGD in 2019 but unable to see records.   - Continue lactulose and rifaximin for HE  - Holding diuretics pending CT chest w/o contrast  - Hepatology consulted for possible transplant evaluation, though history of BP and inconsistent use of lactulose for HE may indicate psychosocial barriers to being listed. Patient unable to interact meaningfully preventing transplant eval

## 2023-05-31 NOTE — HPI
57 y/o F w PMHx of EtOH induced cirrhosis, seizure on (LEV and ZNS) and bipolar disorder on lithium currently admitted to hospital medicine for encephalopathy likely hepatic encephalopathy and liver failure and neurology consulted for persistent encephalopathy despite treatment. History obtained from chart review and primary team as patient unable to provide history. Patient initially presented to [facility] 5/18/23 for encephalopathy and thought to be multifactorial including UTI (Ucx Proteus mirabilis now w/p ceftriaxone EOT5/24), hypercalcemia 2/2 lithium toxicity that was corrected with fluids and lithium held, as well as hepatic encephalopathy 2/2 medication non compliance and was treated with lactulose and rifaximin and no significant improvement noted after 8 days. She was seen by Psychiatry who started her on abilify and patient also had EEG that showed generalized slowing as well as triphasic discharges on left hemisphere that seemed more consistent with encephalopathy rather than epileptic. MRI brain w/o con unremarkable for acute changes. She was seen by Neurology and thought this was liver induced/metabolic induced. Patient then started to develop worsening liver function and given persistent encephalopathy was transferred here for liver evaluation and HLOC.     Since transfer (5/28/23) per team patient was minimally verbal at first and was requiring Ngtube at OSH but since arrival she has been able to swallow on her own and follows some simple commands. Workup to now includes persistent leukocytosis but afebrile, INR downtrending while on Vit K, unptrending LFT (101/83), ammonia 48, b12 >2000. She was seen by psychiatry who do not believe mental status is primarly psychiatric and hepatology following and has patient on rifaximin and lactulose but not entirely convinced that presentation is purely HE given treatment and currently ruling out other causes such as Ronnie's disease or vitamin  deficiency.

## 2023-05-31 NOTE — SUBJECTIVE & OBJECTIVE
"Interval History: No acute events overnight. Patient this AM more conversive. Does not speak without prompting, but does respond to questions with yes and no answers. When asked her favorite food, she replied "steak." Patient later followed simple commands such as life your hands and make a fist.     Review of Systems   Unable to perform ROS: Mental status change   Objective:     Vital Signs (Most Recent):  Temp: 97.6 °F (36.4 °C) (05/31/23 0900)  Pulse: 85 (05/31/23 0900)  Resp: 15 (05/31/23 0900)  BP: (!) 109/57 (05/31/23 0900)  SpO2: 100 % (05/31/23 0900) Vital Signs (24h Range):  Temp:  [97.5 °F (36.4 °C)-98.1 °F (36.7 °C)] 97.6 °F (36.4 °C)  Pulse:  [75-88] 85  Resp:  [15-18] 15  SpO2:  [92 %-100 %] 100 %  BP: ()/(57-67) 109/57     Weight: 59.9 kg (132 lb)  Body mass index is 24.14 kg/m².    Intake/Output Summary (Last 24 hours) at 5/31/2023 1103  Last data filed at 5/31/2023 0553  Gross per 24 hour   Intake 1333.75 ml   Output 775 ml   Net 558.75 ml           Physical Exam  Constitutional:       General: She is not in acute distress.     Appearance: She is well-developed. She is ill-appearing and toxic-appearing.   HENT:      Head: Normocephalic and atraumatic.   Eyes:      General: Scleral icterus present.      Conjunctiva/sclera: Conjunctivae normal.      Pupils: Pupils are equal, round, and reactive to light.   Neck:      Thyroid: No thyromegaly.      Vascular: No JVD.   Cardiovascular:      Rate and Rhythm: Normal rate and regular rhythm.      Heart sounds: Normal heart sounds. No murmur heard.    No friction rub. No gallop.   Pulmonary:      Effort: Pulmonary effort is normal.      Breath sounds: Normal breath sounds. No wheezing or rales.   Abdominal:      General: Bowel sounds are normal. There is no distension.      Palpations: Abdomen is soft.      Tenderness: There is no abdominal tenderness. There is no guarding or rebound.   Musculoskeletal:         General: No tenderness. Normal range of " motion.      Cervical back: Neck supple.      Right lower leg: No edema.      Left lower leg: No edema.      Comments: Edema in hands bilaterally   Skin:     General: Skin is warm and dry.      Coloration: Skin is not jaundiced.   Neurological:      Mental Status: She is lethargic, disoriented and confused.      Cranial Nerves: No cranial nerve deficit.   Psychiatric:         Cognition and Memory: Cognition is impaired. Memory is impaired.           Significant Labs: All pertinent labs within the past 24 hours have been reviewed.    Significant Imaging: I have reviewed all pertinent imaging results/findings within the past 24 hours.

## 2023-05-31 NOTE — ASSESSMENT & PLAN NOTE
Down to 146 before transfer, initially elevated to 155 2/2 hypovolemia from hypercalcemia. Patient received NaCl infusions to help with hypercalcemia  - Na on transfer 150  - improved after D5 initiation, Na now increasing. Pending CT chest w/o contrast will likely restart D5

## 2023-05-31 NOTE — ASSESSMENT & PLAN NOTE
Chronic issue  - stable. No petechiae on exam  - Continue to monitor with daily cbc  - Transfuse for platelets <10k or <50k w/ active bleeding

## 2023-05-31 NOTE — PROGRESS NOTES
Fed patient breakfast.  Patient able to eat a whole yogurt and about 6 spoonfuls of grits and sausage.  Patient slow to eat and needs to be prompted to open mouth and swallow.  Patient disoriented to person, place, time, and situation and states inappropriate responses, but does follow command.  Patient took medications crushed and able to take lactulose through a straw.  Patient with large liquid BM saturating to foot of bed.  Notified Dr. Winston and order received for FMS.  Also noted to MD that patient pupils are irregular and no reactive.  MD aware and noted on previous assessment.  Also informed of 75cc of urine over last 4 hours.  Will continue to monitor patient.

## 2023-05-31 NOTE — PROGRESS NOTES
[facility] Medicine  Progress Note    Patient Name: Marely Hamilton  MRN: [id number]  Patient Class: IP- Inpatient   Admission Date: 5/28/2023  Length of Stay: 3 days  Attending Physician: Derick Winston MD  Primary Care Provider: Viktor Ross MD        Subjective:     Principal Problem:Acute encephalopathy        HPI:  Patient is a 58 y.o.female w/ PMHx of EtOH induced cirrhosis, seizure on AEDs, and bipolar disorder on lithium who initially presented to [facility] on 5/18/2023 with a primary complaint of altered mental status suspected to be from hepatic encephalopathy versus hypercalcemia who despite correction of her calcium and treatment with lactulose and rifaximin continued to be encephalopathic.  Given patient's persistent encephalopathy, there was a concern that she had uncontrolled hepatic encephalopathy and was transferred to [facility] for further evaluation.    During patient's hospitalization at [facility], she was noted to be hypercalcemic likely secondary to her lithium use for her bipolar disorder.  She was started on aripiprazole at that time.  Given her inability to follow commands, she had an NG-tube placed for her to safely accept medicines.  Upon arrival here, her NG tube was essentially dislodged and subsequently removed.  On bedside swallow assessment, patient was able to follow commands and swallow safely.  Additionally, on arrival patient had a small bowel movement.  When asked where she was, remained silent.  She later shared unprompted that she has a history of glaucoma.  Patient later stated her full name.  She was able to follow simple commands, but not 2 step commands.      Overview/Hospital Course:  Patient continues to have delay with her speech but more conversive today.       Interval History: No acute events overnight. Patient this AM more conversive. Does not speak without prompting, but does respond to questions with  "yes and no answers. When asked her favorite food, she replied "steak." Patient later followed simple commands such as life your hands and make a fist.     Review of Systems   Unable to perform ROS: Mental status change   Objective:     Vital Signs (Most Recent):  Temp: 97.6 °F (36.4 °C) (05/31/23 0900)  Pulse: 85 (05/31/23 0900)  Resp: 15 (05/31/23 0900)  BP: (!) 109/57 (05/31/23 0900)  SpO2: 100 % (05/31/23 0900) Vital Signs (24h Range):  Temp:  [97.5 °F (36.4 °C)-98.1 °F (36.7 °C)] 97.6 °F (36.4 °C)  Pulse:  [75-88] 85  Resp:  [15-18] 15  SpO2:  [92 %-100 %] 100 %  BP: ()/(57-67) 109/57     Weight: 59.9 kg (132 lb)  Body mass index is 24.14 kg/m².    Intake/Output Summary (Last 24 hours) at 5/31/2023 1103  Last data filed at 5/31/2023 0553  Gross per 24 hour   Intake 1333.75 ml   Output 775 ml   Net 558.75 ml           Physical Exam  Constitutional:       General: She is not in acute distress.     Appearance: She is well-developed. She is ill-appearing and toxic-appearing.   HENT:      Head: Normocephalic and atraumatic.   Eyes:      General: Scleral icterus present.      Conjunctiva/sclera: Conjunctivae normal.      Pupils: Pupils are equal, round, and reactive to light.   Neck:      Thyroid: No thyromegaly.      Vascular: No JVD.   Cardiovascular:      Rate and Rhythm: Normal rate and regular rhythm.      Heart sounds: Normal heart sounds. No murmur heard.    No friction rub. No gallop.   Pulmonary:      Effort: Pulmonary effort is normal.      Breath sounds: Normal breath sounds. No wheezing or rales.   Abdominal:      General: Bowel sounds are normal. There is no distension.      Palpations: Abdomen is soft.      Tenderness: There is no abdominal tenderness. There is no guarding or rebound.   Musculoskeletal:         General: No tenderness. Normal range of motion.      Cervical back: Neck supple.      Right lower leg: No edema.      Left lower leg: No edema.      Comments: Edema in hands bilaterally "   Skin:     General: Skin is warm and dry.      Coloration: Skin is not jaundiced.   Neurological:      Mental Status: She is lethargic, disoriented and confused.      Cranial Nerves: No cranial nerve deficit.   Psychiatric:         Cognition and Memory: Cognition is impaired. Memory is impaired.           Significant Labs: All pertinent labs within the past 24 hours have been reviewed.    Significant Imaging: I have reviewed all pertinent imaging results/findings within the past 24 hours.      Assessment/Plan:      * Acute encephalopathy  Patient presents as an outside hospital transfer for persistent encephalopathy.  She is been treated for her hypercalcemia as well as hepatic encephalopathy and continues to remain encephalopathic with concerns for persistent encephalopathy from a hepatic source.  While at the outside hospital, she was taken off of lithium and transitioned to aripiprazole for her bipolar disorder.  She had an EEG performed which showed diffuse slowing consistent with encephalopathy, however no epileptiform discharges.  Patient has a seizure history for which she was on Keppra and zonisamide.  On evaluation at [facility], patient able to follow one-step commands, oriented to self but not situation or time, no clonus in the lower extremities, but patient has fine motor tremors. MRI imaging at OSH unremarkable.  - exam continues to wax and wane  - Continue Keppra 1g q12h and zonisamide 100mg q12  - Continue lactulose (increased to 30g TID) and rifaximin  - q4h neuro checks  - Continue aripiprazole  - Lithium low  - PETH level pending  - Consulted neurology given persistent encephalopathy.    Hepatic cirrhosis  MELD-Na: 18 at 5/31/2023  7:08 AM  MELD: 18 at 5/31/2023  7:08 AM  Calculated from:  Serum Creatinine: 0.7 mg/dL (Using min of 1 mg/dL) at 5/31/2023  7:08 AM  Serum Sodium: 147 mmol/L (Using max of 137 mmol/L) at 5/31/2023  7:08 AM  Total Bilirubin: 3.9 mg/dL at 5/31/2023  7:08  AM  INR(ratio): 1.7 at 5/31/2023  7:08 AM    Hx of etoh cirrhosis, current elevated MELD driven by INR and tbili. Concerns for acute decompensation with HE. No evidence of volume overload. Last EGD in 2019 but unable to see records.   - Continue lactulose and rifaximin for HE  - Holding diuretics pending CT chest w/o contrast  - Hepatology consulted for possible transplant evaluation, though history of BP and inconsistent use of lactulose for HE may indicate psychosocial barriers to being listed. Patient unable to interact meaningfully preventing transplant eval        Acute hypoxemic respiratory failure  Patient with Hypoxic Respiratory failure which is Acute.  she is not on home oxygen. Supplemental oxygen was provided and noted-      .   Signs/symptoms of respiratory failure include- hypoxia. Contributing diagnoses includes - N/A Labs and images were reviewed. Patient Has not had a recent ABG. Will treat underlying causes and adjust management of respiratory failure as follows  - CXR personally reviewed, demonstrates bilateral interstitial opacities with possible blunting of costophrenic angle on R side. Initial concern for pulmonary edema given recent fluids but patient's O2 req did not improve with diuresis.   - TTE with showing left sided pleural effusion, normal EF, normal CVP  - CT chest w/o contrast    Hypernatremia  Down to 146 before transfer, initially elevated to 155 2/2 hypovolemia from hypercalcemia. Patient received NaCl infusions to help with hypercalcemia  - Na on transfer 150  - improved after D5 initiation, Na now increasing. Pending CT chest w/o contrast will likely restart D5      Bipolar 1 disorder  Continue aripiprazole  - psychiatry following        Seizure  Continue keppra and zonisamide      Hepatic encephalopathy  Chronic, unstable. Persistent confusion, grade 2  - Continuing lactulose and rifaximin as stated elsewhere      Macrocytic anemia  Chronic, stable  - Hgb 9.0  - CTM  - transfuse  for <7        Thrombocytopenia  Chronic issue  - stable. No petechiae on exam  - Continue to monitor with daily cbc  - Transfuse for platelets <10k or <50k w/ active bleeding        VTE Risk Mitigation (From admission, onward)         Ordered     IP VTE LOW RISK PATIENT  Once         05/28/23 1129     Place sequential compression device  Until discontinued         05/28/23 1129                Discharge Planning   BRAYAN: 6/7/2023     Code Status: Full Code   Is the patient medically ready for discharge?: No    Reason for patient still in hospital (select all that apply): Patient trending condition  Discharge Plan A:  (TBD)                  Derick Winston MD  Department of Hospital Medicine   [facility] - Transplant Stepdown

## 2023-05-31 NOTE — ASSESSMENT & PLAN NOTE
57 y/o F w PMHx of EtOH induced cirrhosis, seizure on (LEV and ZNS) and bipolar disorder on lithium currently admitted to hospital medicine for encephalopathy likely hepatic encephalopathy and liver failure and neurology consulted for persistent encephalopathy despite treatment. She was treated at OSH for UTI (Cx proteus mirabilis completed CTX 5/24/23), hyperCa, lithium toxicity and possible hepatic encephalopathy but after treatment no improvement and worsening hepatic function for which transferred here (see HPI for details). Have been receiving lactulose and rifaximin per hepatology and minimal improvement. Psychiatry saw patient and cont abilify but do not believe mental status purely psychiatric. W/u includes MRI brain w/o con w/o acute process but significant generalized volume loss particularly temporal and frontal lobes. EEG OSH that showed gen slowing and triphasic waves at left hemisphere consistent w encephalopathy.     She is minimally verbal and inconsistently follows commands. Lab work shows leukocytosis and worsening liver function.   Overall presentation seems more consistent with metabolic encephalopathy/multifactorial now with breakthrough seizures. Unclear whether patient had been seizing since here but she had been awake and alert and following commands yesterday and had been participating with nurse and primary phyisician as well. She is afebrile and no signs of infection rather than leukocytosis but has CT chest c/f pneumonia. Also having up trending LFT and Na that could be contributing to mental status change and BT seizures in patient w poor cerebral reserve     Recommendations   -- Cont EEG   -- Repeat vitamin levels, particularly thiamine and continue repletion   -- Continue to treat metabolic issues, specifically liver.  -- Continue AED: LEV 1500g BID and ZNS 200mg QD   -- Start LCM 150mg IV; discussed w NCC

## 2023-05-31 NOTE — ASSESSMENT & PLAN NOTE
Patient with Hypoxic Respiratory failure which is Acute.  she is not on home oxygen. Supplemental oxygen was provided and noted-      .   Signs/symptoms of respiratory failure include- hypoxia. Contributing diagnoses includes - N/A Labs and images were reviewed. Patient Has not had a recent ABG. Will treat underlying causes and adjust management of respiratory failure as follows  - CXR personally reviewed, demonstrates bilateral interstitial opacities with possible blunting of costophrenic angle on R side. Initial concern for pulmonary edema given recent fluids but patient's O2 req did not improve with diuresis.   - TTE with showing left sided pleural effusion, normal EF, normal CVP  - CT chest w/o contrast

## 2023-05-31 NOTE — PLAN OF CARE
Plan of care reviewed with the patient at the beginning of the shift. Pt admitted with hepatic encephalopathy. Pt continues to be disoriented x4. Pt has been nonverbal for this shift. Pt reoriented to being in the hospital but showed no evidence of learning orientation status. She was unable to follow commands but was was able to take meds in pudding. Pt incontinent. Bmx2 overnight. On lactulose. Perineum excoriated. Thick barrier cream applied. Fall precautions maintained. She does not get oob. Minimal voluntary movements. Pt remained free from falls and injury this shift. Bed locked in lowest position, side rails up x2, call light within reach. Instructed pt to call for assistance as needed. Vitals stable. O2 at 5L nasal canula. Pt afebrile overnight. No acute issues overnight. Saldaña in place for 24 hour urine continued. Will continue to monitor.

## 2023-05-31 NOTE — PLAN OF CARE
NEUROLOGY PROGRESS NOTE     Briefly, this is a 57 y/o F w PMHx of EtOH induced cirrhosis, seizure on (LEV and ZNS) and bipolar disorder on lithium currently admitted to hospital medicine for encephalopathy likely hepatic encephalopathy and liver failure and neurology consulted for persistent encephalopathy despite treatment. She was treated at OSH for UTI (Cx proteus mirabilis completed CTX 5/24/23), hyperCa, lithium toxicity and possible hepatic encephalopathy but after treatment no improvement and worsening hepatic function for which transferred here (see HPI for details). Have been receiving lactulose and rifaximin per hepatology and minimal improvement. Psychiatry saw patient and cont abilify but do not believe mental status purely psychiatric. W/u includes MRI brain w/o con w/o acute process but significant generalized volume loss particularly temporal and frontal lobes.   EEG OSH that showed gen slowing and triphasic waves at left hemisphere consistent w encephalopathy.   Patient mentation has not regressed, she is minimally verbal and inconsistently follows commands. Lab work shows leukocytosis and worsening liver function.     Overall presentation seems more consistent with metabolic encephalopathy/multifactorial rather than purely neurological. She is afebrile and no signs of infection rather than leukocytosis but has CT chest c/f pneumonia. Also having up trending LFT and Na that could be contributing to mental status change in patient w poor cerebral reserve       Recommendations   -- Agree with EEG will follow   -- Repeat vitamin levels, particularly thiamine and continue repletion   -- Continue to treat metabolic issues, specifically liver. Evaluate for drug induced?   -- Continue AED: LEV 1g BID and ZNS 100mg QD   -- Full consult note tomorrow; this was discussed with neurology staff in detail.     Chantel Veronica MD   Neurology Resident, PGY3  [facility]

## 2023-05-31 NOTE — ASSESSMENT & PLAN NOTE
Chronic, unstable. Persistent confusion, grade 2  - Continuing lactulose and rifaximin as stated elsewhere

## 2023-05-31 NOTE — ASSESSMENT & PLAN NOTE
Patient presents as an outside hospital transfer for persistent encephalopathy.  She is been treated for her hypercalcemia as well as hepatic encephalopathy and continues to remain encephalopathic with concerns for persistent encephalopathy from a hepatic source.  While at the outside hospital, she was taken off of lithium and transitioned to aripiprazole for her bipolar disorder.  She had an EEG performed which showed diffuse slowing consistent with encephalopathy, however no epileptiform discharges.  Patient has a seizure history for which she was on Keppra and zonisamide.  On evaluation at [facility], patient able to follow one-step commands, oriented to self but not situation or time, no clonus in the lower extremities, but patient has fine motor tremors. MRI imaging at OSH unremarkable.  - exam continues to wax and wane  - Continue Keppra 1g q12h and zonisamide 100mg q12  - Continue lactulose (increased to 30g TID) and rifaximin  - q4h neuro checks  - Continue aripiprazole  - Lithium low  - PETH level pending  - Consulted neurology given persistent encephalopathy.

## 2023-05-31 NOTE — PLAN OF CARE
"Patient remaining free from injury and lying in bed.  Patient being turned Q2H and being positioned with pillows and wedge.  Excoriation noted and zinc based cream applied.  No pressure injuries noted.  Patient with heel protector boots, which have been removed and legs elevated on pillows.  BUE +3 edema noted.  Arms weeping and elevated on pillows. Patient given Lasix and noted 400cc urine an hour later.  24 hour urine sent for copper.  Bed alarm on.  One large BM.  Patient not oriented x 4 but follows commands.  Patient slow to respond with delayed and inappropriate responses.  Fluids offered and patient with adequate liquid intake.  Patient stated "no" when attempted to feed lunch.  Saldaña care, nail care, and mouth care provided.  NO family present at bedside.  EEG placed this afternoon and in progress.  CT of chest performed.  Patient remains on 4L NC O2.  Patient sats 89% rm air,  92% on 2L, and 99% on 4L.  PT/OT came to work with patient, but patient was in CT at that time.  No s/s of infection noted at this time.   "

## 2023-06-01 PROBLEM — G40.901 NON-CONVULSIVE STATUS EPILEPTICUS: Status: ACTIVE | Noted: 2023-06-01

## 2023-06-01 PROBLEM — G40.919 BREAKTHROUGH SEIZURE: Status: ACTIVE | Noted: 2017-01-22

## 2023-06-01 LAB
ALBUMIN SERPL BCP-MCNC: 1.5 G/DL (ref 3.5–5.2)
ALP SERPL-CCNC: 209 U/L (ref 55–135)
ALT SERPL W/O P-5'-P-CCNC: 76 U/L (ref 10–44)
ANION GAP SERPL CALC-SCNC: 7 MMOL/L (ref 8–16)
ANISOCYTOSIS BLD QL SMEAR: SLIGHT
AST SERPL-CCNC: 83 U/L (ref 10–40)
BASOPHILS # BLD AUTO: 0.02 K/UL (ref 0–0.2)
BASOPHILS NFR BLD: 0.1 % (ref 0–1.9)
BILIRUB SERPL-MCNC: 3 MG/DL (ref 0.1–1)
BUN SERPL-MCNC: 24 MG/DL (ref 6–20)
CALCIUM SERPL-MCNC: 7.9 MG/DL (ref 8.7–10.5)
CHLORIDE SERPL-SCNC: 118 MMOL/L (ref 95–110)
CO2 SERPL-SCNC: 22 MMOL/L (ref 23–29)
CREAT SERPL-MCNC: 0.9 MG/DL (ref 0.5–1.4)
DIFFERENTIAL METHOD: ABNORMAL
EOSINOPHIL # BLD AUTO: 0.3 K/UL (ref 0–0.5)
EOSINOPHIL NFR BLD: 1.8 % (ref 0–8)
ERYTHROCYTE [DISTWIDTH] IN BLOOD BY AUTOMATED COUNT: 23.7 % (ref 11.5–14.5)
EST. GFR  (NO RACE VARIABLE): >60 ML/MIN/1.73 M^2
GLUCOSE SERPL-MCNC: 145 MG/DL (ref 70–110)
HCT VFR BLD AUTO: 27.2 % (ref 37–48.5)
HGB BLD-MCNC: 8 G/DL (ref 12–16)
IMM GRANULOCYTES # BLD AUTO: 0.08 K/UL (ref 0–0.04)
IMM GRANULOCYTES NFR BLD AUTO: 0.6 % (ref 0–0.5)
INR PPP: 1.7 (ref 0.8–1.2)
LYMPHOCYTES # BLD AUTO: 2.5 K/UL (ref 1–4.8)
LYMPHOCYTES NFR BLD: 18.1 % (ref 18–48)
MAGNESIUM SERPL-MCNC: 1.7 MG/DL (ref 1.6–2.6)
MCH RBC QN AUTO: 34.5 PG (ref 27–31)
MCHC RBC AUTO-ENTMCNC: 29.4 G/DL (ref 32–36)
MCV RBC AUTO: 117 FL (ref 82–98)
MONOCYTES # BLD AUTO: 1.3 K/UL (ref 0.3–1)
MONOCYTES NFR BLD: 9.3 % (ref 4–15)
NEUTROPHILS # BLD AUTO: 9.6 K/UL (ref 1.8–7.7)
NEUTROPHILS NFR BLD: 70.1 % (ref 38–73)
NRBC BLD-RTO: 0 /100 WBC
PHOSPHATE SERPL-MCNC: 3.3 MG/DL (ref 2.7–4.5)
PLATELET # BLD AUTO: 65 K/UL (ref 150–450)
PLATELET BLD QL SMEAR: ABNORMAL
PMV BLD AUTO: 10.4 FL (ref 9.2–12.9)
POCT GLUCOSE: 146 MG/DL (ref 70–110)
POCT GLUCOSE: 177 MG/DL (ref 70–110)
POIKILOCYTOSIS BLD QL SMEAR: SLIGHT
POLYCHROMASIA BLD QL SMEAR: ABNORMAL
POTASSIUM SERPL-SCNC: 3.3 MMOL/L (ref 3.5–5.1)
PROT SERPL-MCNC: 3.9 G/DL (ref 6–8.4)
PROTHROMBIN TIME: 17.6 SEC (ref 9–12.5)
RBC # BLD AUTO: 2.32 M/UL (ref 4–5.4)
SODIUM SERPL-SCNC: 147 MMOL/L (ref 136–145)
WBC # BLD AUTO: 13.7 K/UL (ref 3.9–12.7)

## 2023-06-01 PROCEDURE — 95720 EEG PHY/QHP EA INCR W/VEEG: CPT | Mod: ,,, | Performed by: PSYCHIATRY & NEUROLOGY

## 2023-06-01 PROCEDURE — 63600175 PHARM REV CODE 636 W HCPCS: Performed by: STUDENT IN AN ORGANIZED HEALTH CARE EDUCATION/TRAINING PROGRAM

## 2023-06-01 PROCEDURE — 80053 COMPREHEN METABOLIC PANEL: CPT | Performed by: STUDENT IN AN ORGANIZED HEALTH CARE EDUCATION/TRAINING PROGRAM

## 2023-06-01 PROCEDURE — 99223 1ST HOSP IP/OBS HIGH 75: CPT | Mod: GC,,, | Performed by: PSYCHIATRY & NEUROLOGY

## 2023-06-01 PROCEDURE — 85610 PROTHROMBIN TIME: CPT | Performed by: STUDENT IN AN ORGANIZED HEALTH CARE EDUCATION/TRAINING PROGRAM

## 2023-06-01 PROCEDURE — C9254 INJECTION, LACOSAMIDE: HCPCS | Performed by: STUDENT IN AN ORGANIZED HEALTH CARE EDUCATION/TRAINING PROGRAM

## 2023-06-01 PROCEDURE — 94761 N-INVAS EAR/PLS OXIMETRY MLT: CPT

## 2023-06-01 PROCEDURE — 97112 NEUROMUSCULAR REEDUCATION: CPT

## 2023-06-01 PROCEDURE — 99291 PR CRITICAL CARE, E/M 30-74 MINUTES: ICD-10-PCS | Mod: GC,,, | Performed by: INTERNAL MEDICINE

## 2023-06-01 PROCEDURE — 63600175 PHARM REV CODE 636 W HCPCS: Performed by: HOSPITALIST

## 2023-06-01 PROCEDURE — 99223 PR INITIAL HOSPITAL CARE,LEVL III: ICD-10-PCS | Mod: GC,,, | Performed by: PSYCHIATRY & NEUROLOGY

## 2023-06-01 PROCEDURE — 95714 VEEG EA 12-26 HR UNMNTR: CPT

## 2023-06-01 PROCEDURE — 25000003 PHARM REV CODE 250: Performed by: STUDENT IN AN ORGANIZED HEALTH CARE EDUCATION/TRAINING PROGRAM

## 2023-06-01 PROCEDURE — 93010 ELECTROCARDIOGRAM REPORT: CPT | Mod: ,,, | Performed by: INTERNAL MEDICINE

## 2023-06-01 PROCEDURE — 99291 CRITICAL CARE FIRST HOUR: CPT | Mod: GC,,, | Performed by: INTERNAL MEDICINE

## 2023-06-01 PROCEDURE — 99900035 HC TECH TIME PER 15 MIN (STAT)

## 2023-06-01 PROCEDURE — 95720 PR EEG, W/VIDEO, CONT RECORD, I&R, >12<26 HRS: ICD-10-PCS | Mod: ,,, | Performed by: PSYCHIATRY & NEUROLOGY

## 2023-06-01 PROCEDURE — 85025 COMPLETE CBC W/AUTO DIFF WBC: CPT | Performed by: STUDENT IN AN ORGANIZED HEALTH CARE EDUCATION/TRAINING PROGRAM

## 2023-06-01 PROCEDURE — 27000221 HC OXYGEN, UP TO 24 HOURS

## 2023-06-01 PROCEDURE — 20000000 HC ICU ROOM

## 2023-06-01 PROCEDURE — 93010 EKG 12-LEAD: ICD-10-PCS | Mod: ,,, | Performed by: INTERNAL MEDICINE

## 2023-06-01 PROCEDURE — 83735 ASSAY OF MAGNESIUM: CPT | Performed by: STUDENT IN AN ORGANIZED HEALTH CARE EDUCATION/TRAINING PROGRAM

## 2023-06-01 PROCEDURE — 84100 ASSAY OF PHOSPHORUS: CPT | Performed by: STUDENT IN AN ORGANIZED HEALTH CARE EDUCATION/TRAINING PROGRAM

## 2023-06-01 PROCEDURE — 51702 INSERT TEMP BLADDER CATH: CPT

## 2023-06-01 PROCEDURE — 36415 COLL VENOUS BLD VENIPUNCTURE: CPT | Performed by: STUDENT IN AN ORGANIZED HEALTH CARE EDUCATION/TRAINING PROGRAM

## 2023-06-01 PROCEDURE — 93005 ELECTROCARDIOGRAM TRACING: CPT

## 2023-06-01 RX ORDER — LORAZEPAM 2 MG/ML
INJECTION INTRAMUSCULAR
Status: DISPENSED
Start: 2023-06-01 | End: 2023-06-01

## 2023-06-01 RX ORDER — LEVETIRACETAM 500 MG/5ML
2000 INJECTION, SOLUTION, CONCENTRATE INTRAVENOUS ONCE
Status: COMPLETED | OUTPATIENT
Start: 2023-06-01 | End: 2023-06-01

## 2023-06-01 RX ORDER — LEVETIRACETAM 500 MG/5ML
1500 INJECTION, SOLUTION, CONCENTRATE INTRAVENOUS EVERY 12 HOURS
Status: DISCONTINUED | OUTPATIENT
Start: 2023-06-01 | End: 2023-06-02

## 2023-06-01 RX ORDER — LORAZEPAM 2 MG/ML
2 INJECTION INTRAMUSCULAR ONCE
Status: COMPLETED | OUTPATIENT
Start: 2023-06-01 | End: 2023-06-01

## 2023-06-01 RX ORDER — LORAZEPAM 2 MG/ML
1 INJECTION INTRAMUSCULAR EVERY 4 HOURS PRN
Status: DISCONTINUED | OUTPATIENT
Start: 2023-06-01 | End: 2023-06-01

## 2023-06-01 RX ADMIN — LACOSAMIDE 150 MG: 10 INJECTION INTRAVENOUS at 11:06

## 2023-06-01 RX ADMIN — LACTULOSE 30 G: 20 SOLUTION ORAL at 10:06

## 2023-06-01 RX ADMIN — SENNOSIDES AND DOCUSATE SODIUM 1 TABLET: 50; 8.6 TABLET ORAL at 10:06

## 2023-06-01 RX ADMIN — LACOSAMIDE 150 MG: 10 INJECTION INTRAVENOUS at 10:06

## 2023-06-01 RX ADMIN — LEVETIRACETAM 1500 MG: 500 INJECTION, SOLUTION INTRAVENOUS at 11:06

## 2023-06-01 RX ADMIN — LORAZEPAM 2 MG: 2 INJECTION INTRAMUSCULAR; INTRAVENOUS at 04:06

## 2023-06-01 RX ADMIN — RIFAXIMIN 550 MG: 550 TABLET ORAL at 10:06

## 2023-06-01 RX ADMIN — LEVETIRACETAM 2000 MG: 500 INJECTION, SOLUTION INTRAVENOUS at 12:06

## 2023-06-01 RX ADMIN — LEVETIRACETAM 1500 MG: 500 INJECTION, SOLUTION INTRAVENOUS at 09:06

## 2023-06-01 RX ADMIN — SODIUM BICARBONATE 1300 MG: 650 TABLET ORAL at 10:06

## 2023-06-01 NOTE — PROCEDURES
EEG prelim  07:00 a.m.-18:20 p.m.    Background:  Continuous, relatively symmetric, mixed frequency theta/delta activity.  There are occasional sporadic generalized discharges with a triphasic morphology which do not organize.  There is cycling between wakefulness and sleep.  There are 4 pushbutton activations for slight myoclonic jerking predominantly of the right hand and tremor movements with no EEG correlate.    Impression:  Moderate-severe encephalopathy with occasional sporadic triphasic waves which is a finding frequently associated with hepatic dysfunction.  There are multiple pushbutton activations for brief myoclonus with no EEG correlate.  There are no definite epileptiform discharges and no electrographic seizures on this portion of the recording session.    Please hit the EEG button with any clinical activity concerning for seizures and describe what you see.    Full report after the completion of the study.    Joan Boss MD PhD  Neurology-Epilepsy  [facility].

## 2023-06-01 NOTE — PT/OT/SLP PROGRESS
Physical Therapy Treatment and Discharge Note    Patient Name: Marely Hamilton   MRN: [id number]    Recommendations:     Discharge Recommendations: nursing facility, skilled  Discharge Equipment Recommendations: to be determined by next level of care  Barriers to discharge: Increased level of assist and Decreased caregiver support    Assessment:     Marely Hamilton is a 58 y.o. female admitted with a medical diagnosis of Acute encephalopathy. Patient briefly awakens to voice but unable to meaningfully participate in therapy session, not following commands. Patient transferred to neuro ICU after session, current orders discontinued, please reconsult when appropriate.    She presents with the following impairments/functional limitations: weakness, impaired endurance, impaired self care skills, impaired functional mobility, gait instability, impaired balance, impaired cognition, decreased upper extremity function, decreased lower extremity function, decreased safety awareness.    Rehab Prognosis: Fair; patient continues to benefit from acute skilled PT services to address these deficits and reach maximum level of function.  Recent Surgery: * No surgery found *      Plan:     Pt transferred to neuro ICU after session, PT orders discontinued, please reconsult when appropriate    Plan of Care Expires:  06/30/23    Subjective     Chief Complaint: No indicators of pain  Patient/Family Comments/Goals: Unable to assess, patient non-verbal   Pain/Comfort:  Pain Rating 1:  (no indicators of pain)    Objective:     Communicated with RN prior to session. Patient found right sidelying with HOB elevated with oxygen, pulse ox (continuous), baugh catheter, EEG upon PT entry to room.     General Precautions: Standard, fall, aspiration  Orthopedic Precautions: N/A  Braces: N/A    Functional Mobility:  Bed Mobility:    Rolling Left:  total assistance  Supine to Sit: total assistance  Sit to Supine: total assistance  Transfers: Deferred  due to poor sitting balance and poor command following   Balance:   Static Sitting: Poor, able to maintain for 8 minute(s) with total assistance, demonstrates posterior lean with full cervical flexion, unable to follow cuing for upright head posture  Dynamic Sitting: not assessed this visit    AM-PAC 6 CLICK MOBILITY  Turning over in bed (including adjusting bedclothes, sheets and blankets)?: 1  Sitting down on and standing up from a chair with arms (e.g., wheelchair, bedside commode, etc.): 1  Moving from lying on back to sitting on the side of the bed?: 1  Moving to and from a bed to a chair (including a wheelchair)?: 1  Need to walk in hospital room?: 1  Climbing 3-5 steps with a railing?: 1  Basic Mobility Total Score: 6     Therapeutic Activities and Exercises:  Patient educated on role of acute care PT and PT POC  Patient is not clear to transfer with RN/PCT    Patient left right sidelying with HOB elevated with all lines intact, call button in reach, bed alarm on, and pressure relief boots applied .    GOALS:   Multidisciplinary Problems       Physical Therapy Goals          Problem: Physical Therapy    Goal Priority Disciplines Outcome Goal Variances Interventions   Physical Therapy Goal     PT, PT/OT Ongoing, Progressing     Description: Goals to be met by: 2023     Patient will increase functional independence with mobility by performin. Supine to sit with moderate assistance  2. Sit to supine with moderate assistance  3. Sit to stand transfer with moderate assistance  4. Bed to chair transfer with maximal assistance using LRAD as needed  5. Gait  x 5 feet with maximal assistance using LRAD as needed  6. Sitting at edge of bed x8 minutes with Stand-by Assistance  7. Lower extremity exercise program x10 reps per handout, with independence                        Time Tracking:     PT Received On: 23  PT Start Time: 853     PT Stop Time: 912  PT Total Time (min): 19 min     Billable  Minutes: Neuromuscular Re-education 8 min      Treatment Type: Treatment  PT/PTA: PT     Number of PTA visits since last PT visit: 0     06/01/2023

## 2023-06-01 NOTE — PROGRESS NOTES
Dr. Ortiz to bedside, pt unable to track + remains unresponsive. Per this nurse, pt exhibiting seizure like symptoms similar to beginning of shift.   Another 2mg IV ativan given. Vitals remain stable  100 SpO2 on 3L, , BP 99/60 MAP 75

## 2023-06-01 NOTE — PROGRESS NOTES
Report called to NEHAL Keith.  Patient transported to [facility] via bed with transport monitor to 9099.  Patient only opens eyes when lightly shaken and does not converse.  Patient VS at baseline.

## 2023-06-01 NOTE — PROCEDURES
EEG    Date/Time: 5/28/2023 10:38 AM  Performed by: Last Chou MD  Authorized by: Brittany De Leon MD     EXTENDED  ELECTROENCEPHALOGRAM  REPORT    DATE OF SERVICE: 5/31/23-6/1/23  EEG NUMBER: [id number]  REQUESTED BY:  José Antonio  LOCATION OF SERVICE:  [facility]    METHODOLOGY   Electroencephalographic (EEG) recording is with electrodes placed according to the International 10-20 placement system.  Thirty two (32) channels of digital signal (sampling rate of 512/sec) including T1 and T2 was simultaneously recorded from the scalp and may include  EKG, EMG, and/or eye monitors.  Recording band pass was 0.1 to 512 hz.  Digital video recording of the patient is simultaneously recorded with the EEG.  The patient is instructed report clinical symptoms which may occur during the recording session.  EEG and video recording is stored and archived in digital format.  Activation procedures which include photic stimulation, hyperventilation and instructing patients to perform simple task are done in selected patients.   The EEG is displayed on a monitor screen and can be reviewed using different montages.  Computer assisted analysis is employed to detect spike and electrographic seizure activity.   The entire record is submitted for computer analysis.  The entire recording is visually reviewed and the times identified by computer analysis as being spikes or seizures are reviewed again.  Compresses spectral analysis (CSA) is also performed on the activity recorded from each individual channel.  This is displayed as a power display of frequencies from 0 to 30 Hz over time.   The CSA is reviewed looking for asymmetries in power between homologous areas of the scalp and then compared with the original EEG recording.     eCollect software was also utilized in the review of this study.  This software suite analyzes the EEG recording in multiple domains.  Coherence and rhythmicity is computed to identify EEG sections which may contain  organized seizures.  Each channel undergoes analysis to detect presence of spike and sharp waves which have special and morphological characteristic of epileptic activity.  The routine EEG recording is converted from spacial into frequency domain.  This is then displayed comparing homologous areas to identify areas of significant asymmetry.  Algorithm to identify non-cortically generated artifact is used to separate eye movement, EMG and other artifact from the EEG.      RECORDING TIMES  Start on 5/31/23 at 15:40:25   Stop on 6/1/23 at 07:00:04      A total of  15 hrs of EEG recording was obtained.    EEG FINDINGS  The record shows a fair  organization at rest, consisting of a 6-7 Hz posterior dominant rhythm with fair  reactivity. There is mild bilateral beta activity.    Drowsiness is characterized by attenuation of the background, vertex waves, and bilateral theta slowing.   Provocative maneuvers including hyperventilation and photic stimulation were performed.     EKG recording shows a regular rhythm.    There are frequent electrographic seizures with onset over the left hemisphere characterized by onset of rhythmic high amplitude 5-6 Hz theta slowing followed by 7-8 Hz spike and wave activity phase reversing over T5 initially lasting on average 10-30 seconds with prolonged seizures over 1.5 hours at a time after 8 PM. Clinically, patient exhibits head turn to midline (typically from the R side towards the midline) and head tremor with prolonged events.    IMPRESSION:  Abnormal study due to frequent focal onset seizures over the right hemisphere with prolonged seizure later in the recording at times approaching nonconvulsive focal onset status epilepticus.     Last Chou MD

## 2023-06-01 NOTE — ASSESSMENT & PLAN NOTE
Patient w history of seizures thought to be 2/2 ETOH w/d in the past and per review has been on AED since 2017. She had been on LEV initially and then there's admission in 2018 where patient was on lacosamide, unsure when medications were changed. On her most recent admissions patient has been on LEV and ZNS. She has prior EEG that have not been diagnostic for epilepsy.   EEG 5/26 gen slowing; triphasic waves at  left hemisphere consistent with encephalopathy     05/30>06/01: EEG showed multiple seizures where patient asleep and wakes up and has starring episode, no rhythmic jerking noted.   Epilepsy overnight increased LEV 1500mg BID and ZNS 200mg HS     Recommendations   -- Cont EEG for monitor and response to AED   -- Continue LEV 1500mg BID and ZNS 200mg HS  -- Added LCM 150mg IV and NCC called and patient in focal status and s/u to NCC   -- If seizure like event please press event button and notify neurology [id number] or epilepsy on call   -- If patient has another seizures, lasting more >5 minutes or frequent seizures not returning to baseline, give ativan 2mg IV   -- Seizure precautions

## 2023-06-01 NOTE — ASSESSMENT & PLAN NOTE
58F with EtOH induced hepatic cirrhosis, seizure disorder on outpatient LEV and ZNS and bipolar disorder admitted on 5/28 for persistent encephalopathy.    - UTI on admit (Proteus mirabilis), now s/p CTX course  - Hypercalcemia 2/2 lithium toxicity (Lithium changed to Abilify per Psych)  - Hepatic encephalopathy 2/2 to medication non compliance, treated with lactulose and rifaximin    MRI Brain WO was unremarkable for acute neurologic change  NH4 48.  EEG w/ frequent left hemispheric electrographic seizures lasting on average 10-30 seconds with prolonged seizures over 1.5 hours also noted.     - Admit to [facility] for airway watch   - Q1h neurochecks while in ICU  - Q1h vitals while in ICU  - HOB@30  - Keppra 1500mg BID  - Zonisamide 150mg BID  - Vimpat 150 BID  - Thiamine  - MAP>65  - Daily CMP, Mag, Phos - replete electrolytes PRN  - Lipid panel, A1c, Coags reviewed  - Daily CBC, transfuse PRN  - Start SubQ Heparin in when clinically indicated   - PT/OT/SLP as appropriate  - CM/SW consult for dispo planning

## 2023-06-01 NOTE — SUBJECTIVE & OBJECTIVE
Past Medical History:   Diagnosis Date    Addiction to drug     Alcohol abuse     Alcohol abuse, in remission 6/15/2015    14.5 weeks ago; AA weekly    Anemia     Anxiety 6/15/2015    Behavioral problem     Bipolar disorder     Bipolar disorder in remission 6/15/2015    Cirrhosis, Laennec's 6/15/2015    Depression     Encounter for blood transfusion     Epistaxis 6/15/2015    Fatigue     Glaucoma     Hematuria     Hepatic encephalopathy 6/15/2015    Hepatic enlargement     History of psychiatric care     History of psychiatric hospitalization     History of seizure 6/15/2015    1    hx of intentional Tylenol overdose 6/15/2015    2005- situational and hx of bipolar    Hx of psychiatric care     Macrocytic anemia 9/18/2015    6 units PRBC since June 2015    Jeana     Osteoarthritis     Other ascites 6/15/2015    Psychiatric exam requested by authority     Psychiatric problem     Psychosis 9/26/2019    Renal disorder     Seizures     Self-harming behavior     Suicide attempt     Therapy     Thrombocytopenia 6/15/2015     Past Surgical History:   Procedure Laterality Date    COSMETIC SURGERY      ESOPHAGOGASTRODUODENOSCOPY        No current facility-administered medications on file prior to encounter.     Current Outpatient Medications on File Prior to Encounter   Medication Sig Dispense Refill    folic acid (FOLVITE) 1 MG tablet Take 1 tablet (1 mg total) by mouth once daily. 30 tablet 2    hydrOXYzine (ATARAX) 50 MG tablet Take 50 mg by mouth every evening.      lactulose (CHRONULAC) 20 gram/30 mL Soln 45 mLs (30 g total) by Per NG tube route 2 (two) times daily.      levETIRAcetam (KEPPRA) 1000 MG tablet Take 1,000 mg by mouth 2 (two) times daily.      propranoloL (INDERAL) 20 MG tablet Take 20 mg by mouth once daily.      rifAXIMin (XIFAXAN) 550 mg Tab Take 1 tablet (550 mg total) by mouth 2 (two) times daily. 60 tablet 2    zonisamide (ZONEGRAN) 100 MG Cap Take 100 mg by mouth once daily.        Allergies: Sulfa  (sulfonamide antibiotics) and Codeine    Family History   Problem Relation Age of Onset    Alcohol abuse Father     Suicide Father     Bipolar disorder Father     Alcohol abuse Sister     Hypertension Mother     Alcohol abuse Paternal Grandfather     Drug abuse Neg Hx     Dementia Neg Hx      Social History     Tobacco Use    Smoking status: Never    Smokeless tobacco: Never   Substance Use Topics    Alcohol use: Not Currently     Comment: hx of ETOH abuse with cirrhosis of liver    Drug use: No     Review of Systems   Unable to perform ROS: Patient unresponsive     Objective:     Vitals:    Temp: 96.8 °F (36 °C)  Pulse: 91  Rhythm: normal sinus rhythm  BP: (!) 88/55  MAP (mmHg): 66  Resp: 18  SpO2: 99 %    Temp  Min: 96.8 °F (36 °C)  Max: 98.2 °F (36.8 °C)  Pulse  Min: 91  Max: 129  BP  Min: 88/55  Max: 111/57  MAP (mmHg)  Min: 66  Max: 81  Resp  Min: 14  Max: 22  SpO2  Min: 96 %  Max: 100 %    05/31 0701 - 06/01 0700  In: 940 [P.O.:940]  Out: 800 [Urine:800]   Unmeasured Output  Urine Occurrence: 0  Stool Occurrence: 0  Emesis Occurrence: 0  Pad Count: 0        Physical Exam  Vitals and nursing note reviewed.   Constitutional:       General: She is not in acute distress.     Appearance: She is ill-appearing.   HENT:      Head: Normocephalic and atraumatic.   Eyes:      General: Scleral icterus present.      Comments:   Sluggish pupils bilaterally    Cardiovascular:      Rate and Rhythm: Normal rate and regular rhythm.      Pulses: Normal pulses.   Pulmonary:      Effort: Pulmonary effort is normal. No respiratory distress.   Abdominal:      General: Abdomen is flat. There is no distension.   Musculoskeletal:      Cervical back: Normal range of motion.      Comments:   Digital clubbing and cyanosis    Skin:     General: Skin is warm.      Coloration: Skin is jaundiced.   Neurological:      Comments:   Does not open eyes to verbal or painful stimuli  Pupils sluggishly reactive with upward gaze  Does not follow  commands  Face symmetric   No usable speech  WD from pain x4     Unable to test orientation, language, memory, judgment, insight, fund of knowledge, hearing, shoulder shrug, tongue protrusion, coordination, gait due to level of consciousness.       Today I personally reviewed pertinent medications, lines/drains/airways, imaging, cardiology results, laboratory results, microbiology results, notably:    EEG with multiple L hemispheric seizures

## 2023-06-01 NOTE — PLAN OF CARE
Disoriented to person, place, time, situation  Per José Antonio IBARRA with epilepsy- pt noted to have focal seizure activity; PRN ativan IV x1, keppra IV x1, and 2nd dose of zonisamide PO. Pt remains very lethargic for remainder of night following interventions  Saldaña CDI, minimal UOP overnight  Vitals remain stable, O2 >95% on 3L, afebrile  Will cont to monitor

## 2023-06-01 NOTE — PROGRESS NOTES
Report received from NEHAL Guerrero for patient to transfer from [facility] [id number] to [facility] [id number]

## 2023-06-01 NOTE — NURSING
Patient arrived to [facility] from [facility]    Report received from:  NEHAL Guerrero    Type of stroke/diagnosis:  seizures    Current symptoms: non-verbal, not following commands, lethargic, cEEG in place, rhythmic movement to BUE-red button pushed, pupils irregular and non-reactive to pen light, yellow sclera, baugh in place,     Skin assessment done: Yes  Wounds noted: Right lower buttock blanchable redness, BLE posterior thigh dry scaled patches of skin, bilateral hands swollen and bruised, clubbed fingers, skin tears to BUE, 2+ pitting edema to BLE    *If wounds noted, was Wound Care consulted? Yes    Jay Completed? No- pt obtunded    Patient Belongings on Admit: (be specific) none    NCC notified: DEYANIRA bernstein

## 2023-06-01 NOTE — HPI
Marely Hamilton is a 58 year old female with a medical history significant for EtOH induced hepatic cirrhosis, seizure disorder on outpatient LEV and ZNS and bipolar disorder who was admitted to [facility] on 5/28 as a transfer from OSH for evaluation of persistent encephalopathy concerning for NCSE vs hepatic encephalopathy. History is obtained from chart as patient is unresponsive and unable to assist. Initially presented to [facility] on 5/18 for encephalopathy and thought to be multifactorial including UTI (Proteus mirabilis s/p CTX course), hypercalcemia 2/2 lithium toxicity (Lithium changed to Abilify per Psych), as well as hepatic encephalopathy secondary to medication non compliance. She was treated with lactulose and rifaximin with no improvement in her mental status. EEG showed generalized slowing as well as triphasic discharges in the left hemisphere. MRI Brain WO was unremarkable for acute neurologic change. Decision was made to transfer patient to [facility] for higher level of care and ongoing evaluation and management. On arrival, mental status exam has waxed and waned with patient being intermittently verbal and following commands. NH4 48.    [facility] is consulted on hospital day 4 for admission after EEG showed frequent left hemispheric electrographic seizures lasting on average 10-30 seconds with prolonged seizures over 1.5 hours also noted. Per chart review, patient home dose of LEV increased from 1000mg to 1500mg BID, ZNS increased to 200mg. Vimpat 150mg BID added per General Neurology (had not been given prior to consult). On initial evaluation, patient is not responsive to voice or pain. Upward gaze noted. Decision made to transfer patient to [facility] for higher level of care and airway watch.

## 2023-06-01 NOTE — H&P
[facility] - Neuro Critical Care  Neurocritical Care  History & Physical    Admit Date: 5/28/2023  Service Date: 06/01/2023  Length of Stay: 4    Subjective:     Chief Complaint: Non-convulsive status epilepticus    History of Present Illness: Marely Hamilton is a 58 year old female with a medical history significant for EtOH induced hepatic cirrhosis, seizure disorder on outpatient LEV and ZNS and bipolar disorder who was admitted to [facility] on 5/28 as a transfer from OSH for evaluation of persistent encephalopathy concerning for NCSE vs hepatic encephalopathy. History is obtained from chart as patient is unresponsive and unable to assist. Initially presented to [facility] on 5/18 for encephalopathy and thought to be multifactorial including UTI (Proteus mirabilis s/p CTX course), hypercalcemia 2/2 lithium toxicity (Lithium changed to Abilify per Psych), as well as hepatic encephalopathy secondary to medication non compliance. She was treated with lactulose and rifaximin with no improvement in her mental status. EEG showed generalized slowing as well as triphasic discharges in the left hemisphere. MRI Brain WO was unremarkable for acute neurologic change. Decision was made to transfer patient to [facility] for higher level of care and ongoing evaluation and management. On arrival, mental status exam has waxed and waned with patient being intermittently verbal and following commands. NH4 48.    NCC is consulted on hospital day 4 for admission after EEG showed frequent left hemispheric electrographic seizures lasting on average 10-30 seconds with prolonged seizures over 1.5 hours also noted. Per chart review, patient home dose of LEV increased from 1000mg to 1500mg BID, ZNS increased to 200mg. Vimpat 150mg BID added per General Neurology (had not been given prior to consult). On initial evaluation, patient is not responsive to voice or pain. Upward gaze noted. Decision made to transfer patient to [facility] for higher level of care  and airway watch.       Past Medical History:   Diagnosis Date    Addiction to drug     Alcohol abuse     Alcohol abuse, in remission 6/15/2015    14.5 weeks ago; AA weekly    Anemia     Anxiety 6/15/2015    Behavioral problem     Bipolar disorder     Bipolar disorder in remission 6/15/2015    Cirrhosis, Laennec's 6/15/2015    Depression     Encounter for blood transfusion     Epistaxis 6/15/2015    Fatigue     Glaucoma     Hematuria     Hepatic encephalopathy 6/15/2015    Hepatic enlargement     History of psychiatric care     History of psychiatric hospitalization     History of seizure 6/15/2015    1    hx of intentional Tylenol overdose 6/15/2015    2005- situational and hx of bipolar    Hx of psychiatric care     Macrocytic anemia 9/18/2015    6 units PRBC since June 2015    Jeana     Osteoarthritis     Other ascites 6/15/2015    Psychiatric exam requested by authority     Psychiatric problem     Psychosis 9/26/2019    Renal disorder     Seizures     Self-harming behavior     Suicide attempt     Therapy     Thrombocytopenia 6/15/2015     Past Surgical History:   Procedure Laterality Date    COSMETIC SURGERY      ESOPHAGOGASTRODUODENOSCOPY        No current facility-administered medications on file prior to encounter.     Current Outpatient Medications on File Prior to Encounter   Medication Sig Dispense Refill    folic acid (FOLVITE) 1 MG tablet Take 1 tablet (1 mg total) by mouth once daily. 30 tablet 2    hydrOXYzine (ATARAX) 50 MG tablet Take 50 mg by mouth every evening.      lactulose (CHRONULAC) 20 gram/30 mL Soln 45 mLs (30 g total) by Per NG tube route 2 (two) times daily.      levETIRAcetam (KEPPRA) 1000 MG tablet Take 1,000 mg by mouth 2 (two) times daily.      propranoloL (INDERAL) 20 MG tablet Take 20 mg by mouth once daily.      rifAXIMin (XIFAXAN) 550 mg Tab Take 1 tablet (550 mg total) by mouth 2 (two) times daily. 60 tablet 2    zonisamide (ZONEGRAN)  100 MG Cap Take 100 mg by mouth once daily.        Allergies: Sulfa (sulfonamide antibiotics) and Codeine    Family History   Problem Relation Age of Onset    Alcohol abuse Father     Suicide Father     Bipolar disorder Father     Alcohol abuse Sister     Hypertension Mother     Alcohol abuse Paternal Grandfather     Drug abuse Neg Hx     Dementia Neg Hx      Social History     Tobacco Use    Smoking status: Never    Smokeless tobacco: Never   Substance Use Topics    Alcohol use: Not Currently     Comment: hx of ETOH abuse with cirrhosis of liver    Drug use: No     Review of Systems   Unable to perform ROS: Patient unresponsive     Objective:     Vitals:    Temp: 96.8 °F (36 °C)  Pulse: 91  Rhythm: normal sinus rhythm  BP: (!) 88/55  MAP (mmHg): 66  Resp: 18  SpO2: 99 %    Temp  Min: 96.8 °F (36 °C)  Max: 98.2 °F (36.8 °C)  Pulse  Min: 91  Max: 129  BP  Min: 88/55  Max: 111/57  MAP (mmHg)  Min: 66  Max: 81  Resp  Min: 14  Max: 22  SpO2  Min: 96 %  Max: 100 %    05/31 0701 - 06/01 0700  In: 940 [P.O.:940]  Out: 800 [Urine:800]   Unmeasured Output  Urine Occurrence: 0  Stool Occurrence: 0  Emesis Occurrence: 0  Pad Count: 0        Physical Exam  Vitals and nursing note reviewed.   Constitutional:       General: She is not in acute distress.     Appearance: She is ill-appearing.   HENT:      Head: Normocephalic and atraumatic.   Eyes:      General: Scleral icterus present.      Comments:   Sluggish pupils bilaterally    Cardiovascular:      Rate and Rhythm: Normal rate and regular rhythm.      Pulses: Normal pulses.   Pulmonary:      Effort: Pulmonary effort is normal. No respiratory distress.   Abdominal:      General: Abdomen is flat. There is no distension.   Musculoskeletal:      Cervical back: Normal range of motion.      Comments:   Digital clubbing and cyanosis    Skin:     General: Skin is warm.      Coloration: Skin is jaundiced.   Neurological:      Comments:   Does not open eyes to verbal or  painful stimuli  Pupils sluggishly reactive with upward gaze  Does not follow commands  Face symmetric   No usable speech  WD from pain x4     Unable to test orientation, language, memory, judgment, insight, fund of knowledge, hearing, shoulder shrug, tongue protrusion, coordination, gait due to level of consciousness.       Today I personally reviewed pertinent medications, lines/drains/airways, imaging, cardiology results, laboratory results, microbiology results, notably:    EEG with multiple L hemispheric seizures       Assessment/Plan:     Neuro  * Non-convulsive status epilepticus  58F with EtOH induced hepatic cirrhosis, seizure disorder on outpatient LEV and ZNS and bipolar disorder admitted on 5/28 for persistent encephalopathy.    - UTI on admit (Proteus mirabilis), now s/p CTX course  - Hypercalcemia 2/2 lithium toxicity (Lithium changed to Abilify per Psych)  - Hepatic encephalopathy 2/2 to medication non compliance, treated with lactulose and rifaximin    MRI Brain WO was unremarkable for acute neurologic change  NH4 48.  EEG w/ frequent left hemispheric electrographic seizures lasting on average 10-30 seconds with prolonged seizures over 1.5 hours also noted.     - Admit to [facility] for airway watch   - Q1h neurochecks while in ICU  - Q1h vitals while in ICU  - HOB@30  - Keppra 1500mg BID  - Zonisamide 150mg BID  - Vimpat 150 BID  - Thiamine  - MAP>65  - Daily CMP, Mag, Phos - replete electrolytes PRN  - Lipid panel, A1c, Coags reviewed  - Daily CBC, transfuse PRN  - Start SubQ Heparin in when clinically indicated   - PT/OT/SLP as appropriate  - CM/SW consult for dispo planning    Acute encephalopathy  Multifactorial   See Non-convulsive status epilepticus    Breakthrough seizure  See Non-convulsive status epilepticus    Psychiatric  Bipolar 1 disorder  See Non-convulsive status epilepticus    Alcohol abuse, in remission  See Non-convulsive status epilepticus    Pulmonary  Acute hypoxemic respiratory  failure  Airway watch    Renal/  Hypernatremia  Daily CMP    Hematology  Thrombocytopenia  Likely secondary to hepatic cirrhosis     Oncology  Macrocytic anemia  Daily CBC    Endocrine  Hyperammonemia  See Non-convulsive status epilepticus    GI  Hepatic cirrhosis  Continue Lactulose and Rifaximin   See Non-convulsive status epilepticus    Hepatic encephalopathy  See Non-convulsive status epilepticus          The patient is being Prophylaxed for:  Venous Thromboembolism with: Mechanical  Stress Ulcer with: Not Applicable   Ventilator Pneumonia with: not applicable    Activity Orders          Diet NPO: NPO starting at 06/01 1055    Turn patient starting at 05/28 1935    Progressive Mobility Protocol (mobilize patient to their highest level of functioning at least twice daily) starting at 05/28 2000        Full Code    Cameron Yadav MD  Neurocritical Care  [facility] - Neuro Critical Care   not applicable

## 2023-06-01 NOTE — PROGRESS NOTES
Notified Dr. Ortiz that patient is much more lethargic than she was at 0730.  She is now only opening eyes when I lightly shake her and not conversive or following commands.  She shows not effort against gravity when assessing limbs.  She is not awake enough to safely take po meds.  MD ordered neuro critical care consult.  IV antiepileptic medications ordered.  Will continue to monitor patient.

## 2023-06-01 NOTE — TREATMENT PLAN
Hepatology Treatment Plan    Marely Hamilton is a 58 y.o. female admitted to hospital 5/28/2023 (Hospital Day: 5) due to Acute encephalopathy.     Interval History  EEG ordered on 05/31 with concern for frequent focal seizure activity overnight approaching status epilepticus; ATIVAN given and KEPPRA and ZONISAMIDE increased. This morning, patient appearing more somnolent and was unable to participate with interview. Transferred to neuro.ICU to monitor for airway protection.     Objective  Temp:  [97.2 °F (36.2 °C)-98.2 °F (36.8 °C)] 97.2 °F (36.2 °C) (06/01 1100)  Pulse:  [] 111 (06/01 1230)  BP: ()/(53-67) 111/57 (06/01 1100)  Resp:  [14-22] 18 (06/01 1230)  SpO2:  [92 %-100 %] 100 % (06/01 1230)    Laboratory    Lab Results   Component Value Date    WBC 13.70 (H) 06/01/2023    HGB 8.0 (L) 06/01/2023    HCT 27.2 (L) 06/01/2023     (H) 06/01/2023    PLT 65 (L) 06/01/2023       Lab Results   Component Value Date     (H) 06/01/2023    K 3.3 (L) 06/01/2023     (H) 06/01/2023    CO2 22 (L) 06/01/2023    BUN 24 (H) 06/01/2023    CREATININE 0.9 06/01/2023    CALCIUM 7.9 (L) 06/01/2023       Lab Results   Component Value Date    ALBUMIN 1.5 (L) 06/01/2023    ALT 76 (H) 06/01/2023    AST 83 (H) 06/01/2023     (H) 06/02/2020    ALKPHOS 209 (H) 06/01/2023    BILITOT 3.0 (H) 06/01/2023       Lab Results   Component Value Date    INR 1.7 (H) 06/01/2023    INR 1.7 (H) 05/31/2023    INR 1.8 (H) 05/30/2023       MELD-Na: 17 at 6/1/2023  6:16 AM  MELD: 17 at 6/1/2023  6:16 AM  Calculated from:  Serum Creatinine: 0.9 mg/dL (Using min of 1 mg/dL) at 6/1/2023  6:16 AM  Serum Sodium: 147 mmol/L (Using max of 137 mmol/L) at 6/1/2023  6:16 AM  Total Bilirubin: 3.0 mg/dL at 6/1/2023  6:16 AM  INR(ratio): 1.7 at 6/1/2023  6:16 AM      Assessment  This is a 58 year old female with PMH significant for ETOH cirrhosis (associated with ascites and encephalopathy; declined for transplant here in 12/2015 due  to malnutrition), bipolar disorder (on Lithium), and unspecified seizure disorder (on AEDs) who was admitted to [facility] on 05/28 as a transfer from [facility] for management of recurrent hepatic encephalopathy following initial admission on 05/18 for acute onset of encephalopathy associated with UTI secondary to Proteus, hypernatremia, and hypercalcemia. She is status post treatment for UTI and electrolyte derangements without improvement in encephalopathy; CT and MRI brain unremarkable and initial EEG at [place] negative for seizures. She is status post evaluation by psychiatry here with low suspicion for psychiatric disorder contributing to encephalopathy. On chart review, patient noted to have recurrent monthly admissions since 01/2023 for hepatic encephalopathy suspected from non-compliance with Lactulose, however her continued encephalopathy since admission seemed atypical for purely hepatic component given re-initiation of Lactulose. Repeat EEG obtain on 05/31 concerning for frequent focal seizure activity approaching status epilepticus; AED's increased and patient transferred to neuro.ICU on 06/01 for closer monitoring.      Recommendations:      -Follow-up 24 hour urine copper to rule out Ronnie's disease and serum vitamin levels to rule out deficiency contributing to encephalopathy.   -Lactulose TID and Rifaximin BID; titrate Lactulose to 3-4 bowel movements daily. If not deemed safe for PO intake, then use Lactulose enemas.   -Okay to hold home diuretics without hypervolemia noted on exam.   -Minimize use of medications that may precipitate encephalopathy (e.g. opioids and benzos).   -CMP and INR daily.   -Despite MELD score of 20, no plans for re-initiation of transplant evaluation given encephalopathy limiting ability to confirm social history with patient and ETOH cessation. Follow-up repeat PETH.     Thank you for involving us in the care of Marely Hamilton. Please call with any additional  concerns or questions.        Sarbjit Hollins MD, PGY-V  Gastroenterology Fellow  [facility]

## 2023-06-01 NOTE — SIGNIFICANT EVENT
"    This evening via  called by Dr. Loretta Chou with epilepsy,  reports that patient is having bouts of focal seizure activity     Reports that seizure activity is subtle - "She has been sleeping with her head tilted to the right most of the day. When she seizes she brings her head to the midline and stares off/doesn't respond."    Recs  -Giving 2mg Iv ativan x 1  -Increase zonisamide to 200mg daily,  with extra 100mg tonight  -Increase keppra to 1500mg bid with extra 1gram dose tonight  -Dr. Boss is on call rest of the night if any further seizure or EEG concerns arise.         Fernando Anderson M.D.  Attending Physician  [facility] Medicine Dept.  Pager: [phone]  Spectralink -x [phone]    "

## 2023-06-01 NOTE — SIGNIFICANT EVENT
Patient was seen this morning 9:00am and she was more lethargic than yesterday.   Per epilepsy patient had multiple seizures last night waking her up from sleep and she had starring episodes.   Patient received ativan 2mg IV and LEV 1 g IV and her LEV increased to 1500mg BID and ZNS to 200mg HS. She had another starring episode per nurse and she received an additional 2mg ativan IV. This morning patient somnolent, unable to participate with PT.     Reviewed this morning' EEG with epilepsy and she was still having frequent runs of epileptic changes and Vimpat 150mg IV ordered and primary team and pharmacy were notified. Specifically concerned for patient's mentation and airway protection.     Called by [facility] that patient has not received her vimpat and she is clinically doing worse.     Patient will be transferred to [facility] for focal status and higher level of care.   This was discussed with attending staff Dr. Meneses.     Chantel Veronica MD   Neurology Resident, PGY3  [facility] [address]

## 2023-06-01 NOTE — CONSULTS
[facility]  Neurology  Consult Note    Patient Name: Marely Hamilton  MRN: [id number]  Admission Date: 5/28/2023  Hospital Length of Stay: 4 days  Code Status: Full Code   Attending Provider: Tony Tapia MD   Consulting Provider: Chantel Veronica MD  Primary Care Physician: Viktor Ross MD  Principal Problem:Acute encephalopathy    Inpatient consult to Neurology  Consult performed by: Chantel Veronica MD  Consult ordered by: Derick Winston MD         Subjective:     Chief Complaint:  Encephalopathy      HPI:   59 y/o F w PMHx of EtOH induced cirrhosis, seizure on (LEV and ZNS) and bipolar disorder on lithium currently admitted to hospital medicine for encephalopathy likely hepatic encephalopathy and liver failure and neurology consulted for persistent encephalopathy despite treatment. History obtained from chart review and primary team as patient unable to provide history. Patient initially presented to [facility] 5/18/23 for encephalopathy and thought to be multifactorial including UTI (Ucx Proteus mirabilis now w/p ceftriaxone EOT5/24), hypercalcemia 2/2 lithium toxicity that was corrected with fluids and lithium held, as well as hepatic encephalopathy 2/2 medication non compliance and was treated with lactulose and rifaximin and no significant improvement noted after 8 days. She was seen by Psychiatry who started her on abilify and patient also had EEG that showed generalized slowing as well as triphasic discharges on left hemisphere that seemed more consistent with encephalopathy rather than epileptic. MRI brain w/o con unremarkable for acute changes. She was seen by Neurology and thought this was liver induced/metabolic induced. Patient then started to develop worsening liver function and given persistent encephalopathy was transferred here for liver evaluation and HLOC.     Since transfer (5/28/23) per team patient was minimally verbal at first and was requiring  Ngtube at OSH but since arrival she has been able to swallow on her own and follows some simple commands. Workup to now includes persistent leukocytosis but afebrile, INR downtrending while on Vit K, unptrending LFT (101/83), ammonia 48, b12 >2000. She was seen by psychiatry who do not believe mental status is primarly psychiatric and hepatology following and has patient on rifaximin and lactulose but not entirely convinced that presentation is purely HE given treatment and currently ruling out other causes such as Ronnie's disease or vitamin deficiency.       Past Medical History:   Diagnosis Date    Addiction to drug     Alcohol abuse     Alcohol abuse, in remission 6/15/2015    14.5 weeks ago; AA weekly    Anemia     Anxiety 6/15/2015    Behavioral problem     Bipolar disorder     Bipolar disorder in remission 6/15/2015    Cirrhosis, Laennec's 6/15/2015    Depression     Encounter for blood transfusion     Epistaxis 6/15/2015    Fatigue     Glaucoma     Hematuria     Hepatic encephalopathy 6/15/2015    Hepatic enlargement     History of psychiatric care     History of psychiatric hospitalization     History of seizure 6/15/2015    1    hx of intentional Tylenol overdose 6/15/2015    2005- situational and hx of bipolar    Hx of psychiatric care     Macrocytic anemia 9/18/2015    6 units PRBC since June 2015    Jeana     Osteoarthritis     Other ascites 6/15/2015    Psychiatric exam requested by authority     Psychiatric problem     Psychosis 9/26/2019    Renal disorder     Seizures     Self-harming behavior     Suicide attempt     Therapy     Thrombocytopenia 6/15/2015       Past Surgical History:   Procedure Laterality Date    COSMETIC SURGERY      ESOPHAGOGASTRODUODENOSCOPY         Review of patient's allergies indicates:   Allergen Reactions    Sulfa (sulfonamide antibiotics) Rash    Codeine Nausea And Vomiting       Current Neurological Medications: as detailed below      No current facility-administered medications on file prior to encounter.     Current Outpatient Medications on File Prior to Encounter   Medication Sig    folic acid (FOLVITE) 1 MG tablet Take 1 tablet (1 mg total) by mouth once daily.    hydrOXYzine (ATARAX) 50 MG tablet Take 50 mg by mouth every evening.    lactulose (CHRONULAC) 20 gram/30 mL Soln 45 mLs (30 g total) by Per NG tube route 2 (two) times daily.    levETIRAcetam (KEPPRA) 1000 MG tablet Take 1,000 mg by mouth 2 (two) times daily.    propranoloL (INDERAL) 20 MG tablet Take 20 mg by mouth once daily.    rifAXIMin (XIFAXAN) 550 mg Tab Take 1 tablet (550 mg total) by mouth 2 (two) times daily.    zonisamide (ZONEGRAN) 100 MG Cap Take 100 mg by mouth once daily.     Family History       Problem Relation (Age of Onset)    Alcohol abuse Father, Sister, Paternal Grandfather    Bipolar disorder Father    Hypertension Mother    Suicide Father          Tobacco Use    Smoking status: Never    Smokeless tobacco: Never   Substance and Sexual Activity    Alcohol use: Not Currently     Comment: hx of ETOH abuse with cirrhosis of liver    Drug use: No    Sexual activity: Not Currently     Review of Systems   Unable to perform ROS: Mental status change   Constitutional:  Negative for fever.   HENT:  Positive for trouble swallowing and voice change.    Gastrointestinal:  Negative for nausea and vomiting.   Skin:  Positive for color change (bruises).   Neurological:  Positive for seizures.   Psychiatric/Behavioral:  Positive for confusion.         Hypoactive      Objective:     Vital Signs (Most Recent):  Temp: 97.6 °F (36.4 °C) (05/31/23 0900)  Pulse: 85 (05/31/23 0900)  Resp: 15 (05/31/23 0900)  BP: (!) 109/57 (05/31/23 0900)  SpO2: 100 % (05/31/23 0900) Vital Signs (24h Range):  Temp:  [97.5 °F (36.4 °C)-98.1 °F (36.7 °C)] 97.6 °F (36.4 °C)  Pulse:  [75-88] 85  Resp:  [15-18] 15  SpO2:  [93 %-100 %] 100 %  BP: ()/(57-65) 109/57     Weight: 59.9 kg  (132 lb)  Body mass index is 24.14 kg/m².     Physical Exam  Constitutional:       Appearance: She is ill-appearing.      Comments: Somnolent   HENT:      Head: Normocephalic.   Eyes:      Extraocular Movements: Extraocular movements intact and EOM normal.      Pupils: Pupils are equal, round, and reactive to light.   Cardiovascular:      Rate and Rhythm: Normal rate.   Pulmonary:      Effort: No respiratory distress.   Musculoskeletal:      Comments: Bl upper extremity edema at hands   Skin:     Findings: Bruising (discoloration at hands with hematomas) present.   Neurological:      Deep Tendon Reflexes:      Reflex Scores:       Tricep reflexes are 1+ on the right side and 1+ on the left side.       Patellar reflexes are 1+ on the right side and 1+ on the left side.       NEUROLOGICAL EXAMINATION:     MENTAL STATUS   Oriented to person.   Disoriented to place.   Disoriented to time.   Follows 1 step commands.   Attention: decreased. Concentration: decreased.   Level of consciousness: alert  Unable to name object. Unable to repeat.        Minimally verbal answering y/n questions but not appropriately nor consistently.   Unable to keep eyes open, able to say her name      CRANIAL NERVES     CN III, IV, VI   Pupils are equal, round, and reactive to light.  Extraocular motions are normal.     CN VII   Facial expression full, symmetric.     MOTOR EXAM   Muscle bulk: decreased  Right arm pronator drift: absent  Left arm pronator drift: absent       Anti gravity in bl upper extremities   Unable to lift bl lower extremities of bed but seem effort dependent/not understanding question      REFLEXES     Reflexes   Right triceps: 1+  Left triceps: 1+  Right patellar: 1+  Left patellar: 1+    SENSORY EXAM   Light touch normal.     Significant Labs: CBC:   Recent Labs   Lab 05/30/23  0709 05/31/23  0708   WBC 13.16* 13.75*   HGB 7.9* 8.9*   HCT 26.6* 28.3*   PLT 56* 57*     CMP:   Recent Labs   Lab 05/29/23  2019 05/30/23  0709  05/31/23  0708   * 162* 120*   * 144 147*   K 3.4* 3.8 4.7   * 119* 118*   CO2 20* 18* 20*   BUN 21* 20 20   CREATININE 0.8 0.6 0.7   CALCIUM 8.1* 8.0* 8.0*   MG  --  1.7 1.6   PROT  --  4.1* 4.2*   ALBUMIN 1.4* 1.4* 1.5*   BILITOT  --  3.4* 3.9*   ALKPHOS  --  185* 206*   AST  --  106* 101*   ALT  --  81* 83*   ANIONGAP 9 7* 9     All pertinent lab results from the past 24 hours have been reviewed.    Significant Imaging: I have reviewed and interpreted all pertinent imaging results/findings within the past 24 hours.    Assessment and Plan:     * Acute encephalopathy  59 y/o F w PMHx of EtOH induced cirrhosis, seizure on (LEV and ZNS) and bipolar disorder on lithium currently admitted to hospital medicine for encephalopathy likely hepatic encephalopathy and liver failure and neurology consulted for persistent encephalopathy despite treatment. She was treated at OSH for UTI (Cx proteus mirabilis completed CTX 5/24/23), hyperCa, lithium toxicity and possible hepatic encephalopathy but after treatment no improvement and worsening hepatic function for which transferred here (see HPI for details). Have been receiving lactulose and rifaximin per hepatology and minimal improvement. Psychiatry saw patient and cont abilify but do not believe mental status purely psychiatric. W/u includes MRI brain w/o con w/o acute process but significant generalized volume loss particularly temporal and frontal lobes. EEG OSH that showed gen slowing and triphasic waves at left hemisphere consistent w encephalopathy.     She is minimally verbal and inconsistently follows commands. Lab work shows leukocytosis and worsening liver function.   Overall presentation seems more consistent with metabolic encephalopathy/multifactorial now with breakthrough seizures. Unclear whether patient had been seizing since here but she had been awake and alert and following commands yesterday and had been participating with nurse and primary  phyisician as well. She is afebrile and no signs of infection rather than leukocytosis but has CT chest c/f pneumonia. Also having up trending LFT and Na that could be contributing to mental status change and BT seizures in patient w poor cerebral reserve     Recommendations   -- Cont EEG   -- Repeat vitamin levels, particularly thiamine and continue repletion   -- Continue to treat metabolic issues, specifically liver.  -- Continue AED: LEV 1500g BID and ZNS 200mg QD   -- Start LCM 150mg IV; discussed w NCC     Non-convulsive status epilepticus  See breakthrough seizure   - S/U to NCC     Hypernatremia  Risk factor for seizures and encephalopathy   -- Continue correction per primary     Breakthrough seizure  Patient w history of seizures thought to be 2/2 ETOH w/d in the past and per review has been on AED since 2017. She had been on LEV initially and then there's admission in 2018 where patient was on lacosamide, unsure when medications were changed. On her most recent admissions patient has been on LEV and ZNS. She has prior EEG that have not been diagnostic for epilepsy.   EEG 5/26 gen slowing; triphasic waves at  left hemisphere consistent with encephalopathy     05/30>06/01: EEG showed multiple seizures where patient asleep and wakes up and has starring episode, no rhythmic jerking noted.   Epilepsy overnight increased LEV 1500mg BID and ZNS 200mg HS     Recommendations   -- Cont EEG for monitor and response to AED   -- Continue LEV 1500mg BID and ZNS 200mg HS  -- Added LCM 150mg IV and NCC called and patient in focal status and s/u to NCC   -- If seizure like event please press event button and notify neurology [id number] or epilepsy on call   -- If patient has another seizures, lasting more >5 minutes or frequent seizures not returning to baseline, give ativan 2mg IV   -- Seizure precautions         Patient s/u to NCC who will continue management for epilepsy and encephalopathy. Please call for any questions or  concerned. Neurology team will be available when patient s/d.     VTE Risk Mitigation (From admission, onward)         Ordered     IP VTE LOW RISK PATIENT  Once         05/28/23 1129     Place sequential compression device  Until discontinued         05/28/23 1129                Thank you for your consult. I will sign off. Please contact us if you have any additional questions.    Chantel Veronica MD  Neurology  [facility]

## 2023-06-01 NOTE — SUBJECTIVE & OBJECTIVE
Interval History:   6/1: active seizures, mental status concerning. NICU consulted.     Review of Systems   Unable to perform ROS: Mental status change   Objective:     Vital Signs (Most Recent):  Temp: 96.8 °F (36 °C) (06/01/23 1345)  Pulse: 103 (06/01/23 1400)  Resp: (!) 22 (06/01/23 1400)  BP: (!) 90/57 (06/01/23 1400)  SpO2: 100 % (06/01/23 1400) Vital Signs (24h Range):  Temp:  [96.8 °F (36 °C)-98.2 °F (36.8 °C)] 96.8 °F (36 °C)  Pulse:  [] 103  Resp:  [14-22] 22  SpO2:  [92 %-100 %] 100 %  BP: ()/(53-67) 90/57     Weight: 59.9 kg (132 lb)  Body mass index is 24.14 kg/m².    Intake/Output Summary (Last 24 hours) at 6/1/2023 1446  Last data filed at 6/1/2023 1130  Gross per 24 hour   Intake 420 ml   Output 800 ml   Net -380 ml         Physical Exam  Constitutional:       General: She is not in acute distress.     Appearance: She is well-developed. She is ill-appearing and toxic-appearing.   HENT:      Head: Normocephalic and atraumatic.   Eyes:      General: Scleral icterus present.      Conjunctiva/sclera: Conjunctivae normal.      Pupils: Pupils are equal, round, and reactive to light.   Neck:      Thyroid: No thyromegaly.      Vascular: No JVD.   Cardiovascular:      Rate and Rhythm: Normal rate and regular rhythm.      Heart sounds: Normal heart sounds. No murmur heard.    No friction rub. No gallop.   Pulmonary:      Effort: Pulmonary effort is normal.      Breath sounds: Normal breath sounds. No wheezing or rales.   Abdominal:      General: Bowel sounds are normal. There is no distension.      Palpations: Abdomen is soft.      Tenderness: There is no abdominal tenderness. There is no guarding or rebound.   Musculoskeletal:         General: No tenderness. Normal range of motion.      Cervical back: Neck supple.      Right lower leg: No edema.      Left lower leg: No edema.      Comments: Edema in hands bilaterally   Skin:     General: Skin is warm and dry.      Coloration: Skin is not jaundiced.    Neurological:      Mental Status: She is lethargic, disoriented and confused.      Cranial Nerves: No cranial nerve deficit.   Psychiatric:         Cognition and Memory: Cognition is impaired. Memory is impaired.           Significant Labs: All pertinent labs within the past 24 hours have been reviewed.    Significant Imaging: I have reviewed all pertinent imaging results/findings within the past 24 hours.

## 2023-06-02 ENCOUNTER — ANESTHESIA (OUTPATIENT)
Dept: NEUROLOGY | Facility: HOSPITAL | Age: 58
DRG: 981 | End: 2023-06-02
Payer: MEDICARE

## 2023-06-02 ENCOUNTER — ANESTHESIA EVENT (OUTPATIENT)
Dept: NEUROLOGY | Facility: HOSPITAL | Age: 58
DRG: 981 | End: 2023-06-02
Payer: MEDICARE

## 2023-06-02 LAB
ALBUMIN SERPL BCP-MCNC: 1.4 G/DL (ref 3.5–5.2)
ALP SERPL-CCNC: 203 U/L (ref 55–135)
ALT SERPL W/O P-5'-P-CCNC: 79 U/L (ref 10–44)
AMMONIA PLAS-SCNC: 55 UMOL/L (ref 10–50)
ANION GAP SERPL CALC-SCNC: 9 MMOL/L (ref 8–16)
ANISOCYTOSIS BLD QL SMEAR: SLIGHT
AST SERPL-CCNC: 107 U/L (ref 10–40)
BACTERIA BLD CULT: NORMAL
BACTERIA BLD CULT: NORMAL
BASO STIPL BLD QL SMEAR: ABNORMAL
BASOPHILS # BLD AUTO: 0.02 K/UL (ref 0–0.2)
BASOPHILS NFR BLD: 0.2 % (ref 0–1.9)
BILIRUB SERPL-MCNC: 3.8 MG/DL (ref 0.1–1)
BUN SERPL-MCNC: 26 MG/DL (ref 6–20)
BURR CELLS BLD QL SMEAR: ABNORMAL
CALCIUM SERPL-MCNC: 7.8 MG/DL (ref 8.7–10.5)
CHLORIDE SERPL-SCNC: 118 MMOL/L (ref 95–110)
CO2 SERPL-SCNC: 22 MMOL/L (ref 23–29)
CREAT SERPL-MCNC: 0.9 MG/DL (ref 0.5–1.4)
DIFFERENTIAL METHOD: ABNORMAL
DOHLE BOD BLD QL SMEAR: PRESENT
EOSINOPHIL # BLD AUTO: 0.1 K/UL (ref 0–0.5)
EOSINOPHIL NFR BLD: 0.4 % (ref 0–8)
ERYTHROCYTE [DISTWIDTH] IN BLOOD BY AUTOMATED COUNT: 23.5 % (ref 11.5–14.5)
EST. GFR  (NO RACE VARIABLE): >60 ML/MIN/1.73 M^2
GLUCOSE SERPL-MCNC: 156 MG/DL (ref 70–110)
HCT VFR BLD AUTO: 27.2 % (ref 37–48.5)
HGB BLD-MCNC: 7.9 G/DL (ref 12–16)
IMM GRANULOCYTES # BLD AUTO: 0.05 K/UL (ref 0–0.04)
IMM GRANULOCYTES NFR BLD AUTO: 0.4 % (ref 0–0.5)
INR PPP: 1.8 (ref 0.8–1.2)
LYMPHOCYTES # BLD AUTO: 1.9 K/UL (ref 1–4.8)
LYMPHOCYTES NFR BLD: 16.9 % (ref 18–48)
MAGNESIUM SERPL-MCNC: 1.8 MG/DL (ref 1.6–2.6)
MAGNESIUM SERPL-MCNC: 2.6 MG/DL (ref 1.6–2.6)
MCH RBC QN AUTO: 34.6 PG (ref 27–31)
MCHC RBC AUTO-ENTMCNC: 29 G/DL (ref 32–36)
MCV RBC AUTO: 119 FL (ref 82–98)
MONOCYTES # BLD AUTO: 0.7 K/UL (ref 0.3–1)
MONOCYTES NFR BLD: 6.5 % (ref 4–15)
NEUTROPHILS # BLD AUTO: 8.4 K/UL (ref 1.8–7.7)
NEUTROPHILS NFR BLD: 75.6 % (ref 38–73)
NIACIN SERPL-MCNC: <5 NG/ML
NICOTINAMIDE SERPL-MCNC: 12 NG/ML (ref 5–48)
NICOTINURATE SERPL-MCNC: <5 NG/ML
NRBC BLD-RTO: 0 /100 WBC
OVALOCYTES BLD QL SMEAR: ABNORMAL
PHOSPHATE SERPL-MCNC: 3.8 MG/DL (ref 2.7–4.5)
PLATELET # BLD AUTO: 56 K/UL (ref 150–450)
PLATELET BLD QL SMEAR: ABNORMAL
PMV BLD AUTO: 10.4 FL (ref 9.2–12.9)
POCT GLUCOSE: 146 MG/DL (ref 70–110)
POCT GLUCOSE: 163 MG/DL (ref 70–110)
POCT GLUCOSE: 196 MG/DL (ref 70–110)
POCT GLUCOSE: 218 MG/DL (ref 70–110)
POCT GLUCOSE: 232 MG/DL (ref 70–110)
POIKILOCYTOSIS BLD QL SMEAR: ABNORMAL
POTASSIUM SERPL-SCNC: 3.4 MMOL/L (ref 3.5–5.1)
POTASSIUM SERPL-SCNC: 4 MMOL/L (ref 3.5–5.1)
PROT SERPL-MCNC: 4.1 G/DL (ref 6–8.4)
PROTHROMBIN TIME: 19.1 SEC (ref 9–12.5)
RBC # BLD AUTO: 2.28 M/UL (ref 4–5.4)
SODIUM SERPL-SCNC: 149 MMOL/L (ref 136–145)
TOXIC GRANULES BLD QL SMEAR: PRESENT
WBC # BLD AUTO: 11.17 K/UL (ref 3.9–12.7)

## 2023-06-02 PROCEDURE — 83735 ASSAY OF MAGNESIUM: CPT | Mod: 91 | Performed by: PSYCHIATRY & NEUROLOGY

## 2023-06-02 PROCEDURE — 25000003 PHARM REV CODE 250: Performed by: INTERNAL MEDICINE

## 2023-06-02 PROCEDURE — 31500 INSERT EMERGENCY AIRWAY: CPT

## 2023-06-02 PROCEDURE — 63600175 PHARM REV CODE 636 W HCPCS: Performed by: HOSPITALIST

## 2023-06-02 PROCEDURE — 94002 VENT MGMT INPAT INIT DAY: CPT

## 2023-06-02 PROCEDURE — C9254 INJECTION, LACOSAMIDE: HCPCS | Performed by: STUDENT IN AN ORGANIZED HEALTH CARE EDUCATION/TRAINING PROGRAM

## 2023-06-02 PROCEDURE — 84132 ASSAY OF SERUM POTASSIUM: CPT | Performed by: PSYCHIATRY & NEUROLOGY

## 2023-06-02 PROCEDURE — 63600175 PHARM REV CODE 636 W HCPCS: Mod: JZ,JG | Performed by: PSYCHIATRY & NEUROLOGY

## 2023-06-02 PROCEDURE — 63600175 PHARM REV CODE 636 W HCPCS: Mod: JZ,JG | Performed by: STUDENT IN AN ORGANIZED HEALTH CARE EDUCATION/TRAINING PROGRAM

## 2023-06-02 PROCEDURE — 99233 SBSQ HOSP IP/OBS HIGH 50: CPT | Mod: ,,, | Performed by: PSYCHIATRY & NEUROLOGY

## 2023-06-02 PROCEDURE — 99233 PR SUBSEQUENT HOSPITAL CARE,LEVL III: ICD-10-PCS | Mod: ,,, | Performed by: PSYCHIATRY & NEUROLOGY

## 2023-06-02 PROCEDURE — 99900026 HC AIRWAY MAINTENANCE (STAT)

## 2023-06-02 PROCEDURE — 25000003 PHARM REV CODE 250: Performed by: PSYCHIATRY & NEUROLOGY

## 2023-06-02 PROCEDURE — 63600175 PHARM REV CODE 636 W HCPCS

## 2023-06-02 PROCEDURE — 85610 PROTHROMBIN TIME: CPT | Performed by: STUDENT IN AN ORGANIZED HEALTH CARE EDUCATION/TRAINING PROGRAM

## 2023-06-02 PROCEDURE — 31500 INSERT EMERGENCY AIRWAY: CPT | Mod: ,,, | Performed by: ANESTHESIOLOGY

## 2023-06-02 PROCEDURE — 25000003 PHARM REV CODE 250: Performed by: STUDENT IN AN ORGANIZED HEALTH CARE EDUCATION/TRAINING PROGRAM

## 2023-06-02 PROCEDURE — 93005 ELECTROCARDIOGRAM TRACING: CPT

## 2023-06-02 PROCEDURE — 25000003 PHARM REV CODE 250

## 2023-06-02 PROCEDURE — 82140 ASSAY OF AMMONIA: CPT | Performed by: STUDENT IN AN ORGANIZED HEALTH CARE EDUCATION/TRAINING PROGRAM

## 2023-06-02 PROCEDURE — 36620 INSERTION CATHETER ARTERY: CPT | Mod: GC,,, | Performed by: PSYCHIATRY & NEUROLOGY

## 2023-06-02 PROCEDURE — 93010 ELECTROCARDIOGRAM REPORT: CPT | Mod: ,,, | Performed by: INTERNAL MEDICINE

## 2023-06-02 PROCEDURE — 20000000 HC ICU ROOM

## 2023-06-02 PROCEDURE — 95720 PR EEG, W/VIDEO, CONT RECORD, I&R, >12<26 HRS: ICD-10-PCS | Mod: ,,, | Performed by: PSYCHIATRY & NEUROLOGY

## 2023-06-02 PROCEDURE — 99291 PR CRITICAL CARE, E/M 30-74 MINUTES: ICD-10-PCS | Mod: 25,,, | Performed by: PSYCHIATRY & NEUROLOGY

## 2023-06-02 PROCEDURE — 94761 N-INVAS EAR/PLS OXIMETRY MLT: CPT

## 2023-06-02 PROCEDURE — 63600175 PHARM REV CODE 636 W HCPCS: Performed by: STUDENT IN AN ORGANIZED HEALTH CARE EDUCATION/TRAINING PROGRAM

## 2023-06-02 PROCEDURE — 27000221 HC OXYGEN, UP TO 24 HOURS

## 2023-06-02 PROCEDURE — 95720 EEG PHY/QHP EA INCR W/VEEG: CPT | Mod: ,,, | Performed by: PSYCHIATRY & NEUROLOGY

## 2023-06-02 PROCEDURE — 27200966 HC CLOSED SUCTION SYSTEM

## 2023-06-02 PROCEDURE — 85025 COMPLETE CBC W/AUTO DIFF WBC: CPT | Performed by: STUDENT IN AN ORGANIZED HEALTH CARE EDUCATION/TRAINING PROGRAM

## 2023-06-02 PROCEDURE — 99291 CRITICAL CARE FIRST HOUR: CPT | Mod: 25,,, | Performed by: PSYCHIATRY & NEUROLOGY

## 2023-06-02 PROCEDURE — 84100 ASSAY OF PHOSPHORUS: CPT | Performed by: STUDENT IN AN ORGANIZED HEALTH CARE EDUCATION/TRAINING PROGRAM

## 2023-06-02 PROCEDURE — 36620 PR INSERT CATH,ART,PERCUT,SHORTTERM: ICD-10-PCS | Mod: GC,,, | Performed by: PSYCHIATRY & NEUROLOGY

## 2023-06-02 PROCEDURE — 31500 AD HOC INTUBATION: ICD-10-PCS | Mod: ,,, | Performed by: ANESTHESIOLOGY

## 2023-06-02 PROCEDURE — 95714 VEEG EA 12-26 HR UNMNTR: CPT

## 2023-06-02 PROCEDURE — P9047 ALBUMIN (HUMAN), 25%, 50ML: HCPCS | Mod: JZ,JG | Performed by: STUDENT IN AN ORGANIZED HEALTH CARE EDUCATION/TRAINING PROGRAM

## 2023-06-02 PROCEDURE — 25000003 PHARM REV CODE 250: Performed by: HOSPITALIST

## 2023-06-02 PROCEDURE — 99900035 HC TECH TIME PER 15 MIN (STAT)

## 2023-06-02 PROCEDURE — 80053 COMPREHEN METABOLIC PANEL: CPT | Performed by: STUDENT IN AN ORGANIZED HEALTH CARE EDUCATION/TRAINING PROGRAM

## 2023-06-02 PROCEDURE — P9047 ALBUMIN (HUMAN), 25%, 50ML: HCPCS | Mod: JZ,JG | Performed by: PSYCHIATRY & NEUROLOGY

## 2023-06-02 PROCEDURE — 83735 ASSAY OF MAGNESIUM: CPT | Performed by: STUDENT IN AN ORGANIZED HEALTH CARE EDUCATION/TRAINING PROGRAM

## 2023-06-02 PROCEDURE — 93010 EKG 12-LEAD: ICD-10-PCS | Mod: ,,, | Performed by: INTERNAL MEDICINE

## 2023-06-02 RX ORDER — LORAZEPAM 2 MG/ML
INJECTION INTRAMUSCULAR
Status: COMPLETED
Start: 2023-06-02 | End: 2023-06-02

## 2023-06-02 RX ORDER — LACTULOSE 10 G/15ML
30 SOLUTION ORAL 3 TIMES DAILY
Status: DISCONTINUED | OUTPATIENT
Start: 2023-06-02 | End: 2023-06-03

## 2023-06-02 RX ORDER — FENTANYL CITRATE 50 UG/ML
50 INJECTION, SOLUTION INTRAMUSCULAR; INTRAVENOUS
Status: DISCONTINUED | OUTPATIENT
Start: 2023-06-02 | End: 2023-06-17

## 2023-06-02 RX ORDER — SODIUM,POTASSIUM PHOSPHATES 280-250MG
2 POWDER IN PACKET (EA) ORAL
Status: DISCONTINUED | OUTPATIENT
Start: 2023-06-02 | End: 2023-06-20

## 2023-06-02 RX ORDER — ALBUMIN HUMAN 250 G/1000ML
25 SOLUTION INTRAVENOUS EVERY 8 HOURS
Status: DISPENSED | OUTPATIENT
Start: 2023-06-02 | End: 2023-06-03

## 2023-06-02 RX ORDER — FENTANYL CITRATE 50 UG/ML
50 INJECTION, SOLUTION INTRAMUSCULAR; INTRAVENOUS
Status: ACTIVE | OUTPATIENT
Start: 2023-06-02 | End: 2023-06-02

## 2023-06-02 RX ORDER — AMOXICILLIN 250 MG
1 CAPSULE ORAL 2 TIMES DAILY
Status: DISCONTINUED | OUTPATIENT
Start: 2023-06-02 | End: 2023-06-03

## 2023-06-02 RX ORDER — CLOBAZAM 10 MG/1
10 TABLET ORAL DAILY
Status: DISCONTINUED | OUTPATIENT
Start: 2023-06-02 | End: 2023-06-02

## 2023-06-02 RX ORDER — THIAMINE HCL 100 MG
100 TABLET ORAL DAILY
Status: DISCONTINUED | OUTPATIENT
Start: 2023-06-03 | End: 2023-06-19

## 2023-06-02 RX ORDER — LORAZEPAM 2 MG/ML
1 INJECTION INTRAMUSCULAR EVERY 10 MIN PRN
Status: COMPLETED | OUTPATIENT
Start: 2023-06-02 | End: 2023-06-02

## 2023-06-02 RX ORDER — FOLIC ACID 1 MG/1
1 TABLET ORAL DAILY
Status: DISCONTINUED | OUTPATIENT
Start: 2023-06-03 | End: 2023-06-19

## 2023-06-02 RX ORDER — ARIPIPRAZOLE 5 MG/1
5 TABLET ORAL DAILY
Status: DISCONTINUED | OUTPATIENT
Start: 2023-06-03 | End: 2023-06-19

## 2023-06-02 RX ORDER — MAGNESIUM SULFATE HEPTAHYDRATE 40 MG/ML
2 INJECTION, SOLUTION INTRAVENOUS ONCE
Status: COMPLETED | OUTPATIENT
Start: 2023-06-02 | End: 2023-06-02

## 2023-06-02 RX ORDER — ALBUMIN HUMAN 250 G/1000ML
25 SOLUTION INTRAVENOUS EVERY 8 HOURS
Status: DISCONTINUED | OUTPATIENT
Start: 2023-06-02 | End: 2023-06-02

## 2023-06-02 RX ORDER — NOREPINEPHRINE BITARTRATE/D5W 4MG/250ML
0-3 PLASTIC BAG, INJECTION (ML) INTRAVENOUS CONTINUOUS
Status: DISCONTINUED | OUTPATIENT
Start: 2023-06-02 | End: 2023-06-03

## 2023-06-02 RX ORDER — NOREPINEPHRINE BITARTRATE/D5W 4MG/250ML
PLASTIC BAG, INJECTION (ML) INTRAVENOUS
Status: COMPLETED
Start: 2023-06-02 | End: 2023-06-02

## 2023-06-02 RX ORDER — SODIUM CHLORIDE 9 MG/ML
INJECTION, SOLUTION INTRAVENOUS CONTINUOUS
Status: DISCONTINUED | OUTPATIENT
Start: 2023-06-02 | End: 2023-06-02

## 2023-06-02 RX ORDER — PROPOFOL 10 MG/ML
INJECTION, EMULSION INTRAVENOUS
Status: COMPLETED
Start: 2023-06-02 | End: 2023-06-02

## 2023-06-02 RX ORDER — ROCURONIUM BROMIDE 10 MG/ML
INJECTION, SOLUTION INTRAVENOUS
Status: COMPLETED
Start: 2023-06-02 | End: 2023-06-02

## 2023-06-02 RX ORDER — SODIUM BICARBONATE 650 MG/1
1300 TABLET ORAL 2 TIMES DAILY
Status: DISCONTINUED | OUTPATIENT
Start: 2023-06-02 | End: 2023-06-10

## 2023-06-02 RX ORDER — ETOMIDATE 2 MG/ML
INJECTION INTRAVENOUS
Status: COMPLETED
Start: 2023-06-02 | End: 2023-06-02

## 2023-06-02 RX ORDER — PROPOFOL 10 MG/ML
0-50 INJECTION, EMULSION INTRAVENOUS CONTINUOUS
Status: DISCONTINUED | OUTPATIENT
Start: 2023-06-02 | End: 2023-06-02

## 2023-06-02 RX ORDER — SODIUM CHLORIDE 450 MG/100ML
INJECTION, SOLUTION INTRAVENOUS CONTINUOUS
Status: DISCONTINUED | OUTPATIENT
Start: 2023-06-02 | End: 2023-06-03

## 2023-06-02 RX ORDER — PHENYLEPHRINE HCL IN 0.9% NACL 1 MG/10 ML
SYRINGE (ML) INTRAVENOUS
Status: COMPLETED
Start: 2023-06-02 | End: 2023-06-02

## 2023-06-02 RX ORDER — MUPIROCIN 20 MG/G
OINTMENT TOPICAL 2 TIMES DAILY
Status: COMPLETED | OUTPATIENT
Start: 2023-06-02 | End: 2023-06-06

## 2023-06-02 RX ORDER — LEVETIRACETAM 500 MG/5ML
1000 INJECTION, SOLUTION, CONCENTRATE INTRAVENOUS EVERY 12 HOURS
Status: DISCONTINUED | OUTPATIENT
Start: 2023-06-02 | End: 2023-06-03

## 2023-06-02 RX ORDER — FAMOTIDINE 20 MG/1
20 TABLET, FILM COATED ORAL 2 TIMES DAILY
Status: DISCONTINUED | OUTPATIENT
Start: 2023-06-02 | End: 2023-06-03

## 2023-06-02 RX ADMIN — NOREPINEPHRINE BITARTRATE 0.35 MCG/KG/MIN: 4 INJECTION, SOLUTION INTRAVENOUS at 11:06

## 2023-06-02 RX ADMIN — FENTANYL CITRATE 50 MCG: 0.05 INJECTION, SOLUTION INTRAMUSCULAR; INTRAVENOUS at 06:06

## 2023-06-02 RX ADMIN — ARIPIPRAZOLE 5 MG: 5 TABLET ORAL at 08:06

## 2023-06-02 RX ADMIN — LACOSAMIDE 150 MG: 10 INJECTION INTRAVENOUS at 11:06

## 2023-06-02 RX ADMIN — Medication 100 MCG: at 10:06

## 2023-06-02 RX ADMIN — LORAZEPAM 1 MG: 2 INJECTION INTRAMUSCULAR; INTRAVENOUS at 09:06

## 2023-06-02 RX ADMIN — SODIUM CHLORIDE: 4.5 INJECTION, SOLUTION INTRAVENOUS at 02:06

## 2023-06-02 RX ADMIN — MUPIROCIN: 20 OINTMENT TOPICAL at 11:06

## 2023-06-02 RX ADMIN — PROPOFOL: 10 INJECTION, EMULSION INTRAVENOUS at 10:06

## 2023-06-02 RX ADMIN — ZONISAMIDE 200 MG: 100 CAPSULE ORAL at 08:06

## 2023-06-02 RX ADMIN — Medication 100 MG: at 08:06

## 2023-06-02 RX ADMIN — POTASSIUM BICARBONATE 35 MEQ: 391 TABLET, EFFERVESCENT ORAL at 04:06

## 2023-06-02 RX ADMIN — SODIUM BICARBONATE 1300 MG: 650 TABLET ORAL at 09:06

## 2023-06-02 RX ADMIN — NOREPINEPHRINE BITARTRATE 0.38 MCG/KG/MIN: 4 INJECTION, SOLUTION INTRAVENOUS at 03:06

## 2023-06-02 RX ADMIN — Medication 800 MG: at 04:06

## 2023-06-02 RX ADMIN — ALBUMIN HUMAN 25 G: 0.25 SOLUTION INTRAVENOUS at 01:06

## 2023-06-02 RX ADMIN — MUPIROCIN: 20 OINTMENT TOPICAL at 09:06

## 2023-06-02 RX ADMIN — ALBUMIN HUMAN 25 G: 0.25 SOLUTION INTRAVENOUS at 09:06

## 2023-06-02 RX ADMIN — INSULIN ASPART 1 UNITS: 100 INJECTION, SOLUTION INTRAVENOUS; SUBCUTANEOUS at 09:06

## 2023-06-02 RX ADMIN — LEVETIRACETAM 1000 MG: 100 INJECTION, SOLUTION INTRAVENOUS at 09:06

## 2023-06-02 RX ADMIN — LACTULOSE 30 G: 20 SOLUTION ORAL at 08:06

## 2023-06-02 RX ADMIN — CLOBAZAM 10 MG: 10 TABLET ORAL at 11:06

## 2023-06-02 RX ADMIN — FENTANYL CITRATE 50 MCG: 0.05 INJECTION, SOLUTION INTRAMUSCULAR; INTRAVENOUS at 11:06

## 2023-06-02 RX ADMIN — NOREPINEPHRINE BITARTRATE 4 MG: 4 INJECTION, SOLUTION INTRAVENOUS at 11:06

## 2023-06-02 RX ADMIN — FOLIC ACID 1 MG: 1 TABLET ORAL at 08:06

## 2023-06-02 RX ADMIN — LACTULOSE 30 G: 20 SOLUTION ORAL at 09:06

## 2023-06-02 RX ADMIN — LACTULOSE 30 G: 20 SOLUTION ORAL at 02:06

## 2023-06-02 RX ADMIN — ROCURONIUM BROMIDE 60 MG: 10 INJECTION, SOLUTION INTRAVENOUS at 11:06

## 2023-06-02 RX ADMIN — RIFAXIMIN 550 MG: 550 TABLET ORAL at 09:06

## 2023-06-02 RX ADMIN — MAGNESIUM SULFATE 2 G: 2 INJECTION INTRAVENOUS at 12:06

## 2023-06-02 RX ADMIN — SENNOSIDES AND DOCUSATE SODIUM 1 TABLET: 50; 8.6 TABLET ORAL at 08:06

## 2023-06-02 RX ADMIN — ACETAMINOPHEN 650 MG: 325 TABLET ORAL at 12:06

## 2023-06-02 RX ADMIN — SENNOSIDES AND DOCUSATE SODIUM 1 TABLET: 50; 8.6 TABLET ORAL at 09:06

## 2023-06-02 RX ADMIN — Medication 800 MG: at 08:06

## 2023-06-02 RX ADMIN — POTASSIUM BICARBONATE 35 MEQ: 391 TABLET, EFFERVESCENT ORAL at 05:06

## 2023-06-02 RX ADMIN — ETOMIDATE 20 MG: 2 INJECTION INTRAVENOUS at 11:06

## 2023-06-02 RX ADMIN — FAMOTIDINE 20 MG: 20 TABLET, FILM COATED ORAL at 09:06

## 2023-06-02 RX ADMIN — INSULIN ASPART 1 UNITS: 100 INJECTION, SOLUTION INTRAVENOUS; SUBCUTANEOUS at 05:06

## 2023-06-02 RX ADMIN — LEVETIRACETAM 1500 MG: 500 INJECTION, SOLUTION INTRAVENOUS at 09:06

## 2023-06-02 RX ADMIN — SODIUM BICARBONATE 1300 MG: 650 TABLET ORAL at 08:06

## 2023-06-02 RX ADMIN — RIFAXIMIN 550 MG: 550 TABLET ORAL at 08:06

## 2023-06-02 RX ADMIN — NOREPINEPHRINE BITARTRATE 0.28 MCG/KG/MIN: 4 INJECTION, SOLUTION INTRAVENOUS at 06:06

## 2023-06-02 NOTE — ANESTHESIA PROCEDURE NOTES
Ad Hoc Intubation    Date/Time: 6/2/2023 11:45 AM    Performed by: Celestina Ortega DO  Authorized by: Lydia Reynaga MD    Indications:  Airway protection  Diagnosis:  Status epilepticus  Patient Location:  ICU  Timeout:  6/2/2023 11:16 AM  Procedure Start Time:  6/2/2023 11:16 AM  Procedure End Time:  6/2/2023 11:18 AM  Staff:     Other Anesthesia Staff:  Lydia Reynaga MD    Anesthesiologist Present: Yes    Intubation:     Induction:  Intravenous    Intubated:  Postinduction    Mask Ventilation:  Easy mask    Attempts:  1    Attempted By:  Resident anesthesiologist    Method of Intubation:  Video laryngoscopy    Blade:  Buitrago 3    Laryngeal View Grade: Grade I - full view of chords      Difficult Airway Encountered?: No      Complications:  None    Airway Device:  Oral endotracheal tube    Airway Device Size:  7.0    Style/Cuff Inflation:  Cuffed    Tube secured:  24    Secured at:  The lips    Placement Verified By:  Colorimetric ETCO2 device    Complicating Factors:  None    Findings Post-Intubation:  BS equal bilateral and atraumatic/condition of teeth unchanged

## 2023-06-02 NOTE — PROCEDURES
[facility] EEG/VIDEO MONITORING REPORT  Marely Hamilton  [id number]  1965    DATE OF SERVICE:  06/01/2023  DATE OF ADMISSION: 5/28/2023 10:38 AM    ADMITTING/REQUESTING PROVIDER: Tony Tapia MD    REASON FOR CONSULT:  58-year-old woman with hepatic dysfunction, asterixis, myoclonus, and diffuse tremor/shivering movements.  Evaluate for evidence of epileptiform activity.    METHODOLOGY   Electroencephalographic (EEG) recording is with electrodes placed according to the International 10-20 placement system.  Thirty two (32) channels of digital signal (sampling rate of 512/sec) including T1 and T2 was simultaneously recorded from the scalp and may include  EKG, EMG, and/or eye monitors.  Recording band pass was 0.1 to 512 hz.  Digital video recording of the patient is simultaneously recorded with the EEG.  The patient is instructed report clinical symptoms which may occur during the recording session.  EEG and video recording is stored and archived in digital format.  Activation procedures which include photic stimulation, hyperventilation and instructing patients to perform simple task are done in selected patients.   The EEG is displayed on a monitor screen and can be reviewed using different montages.  Computer assisted analysis is employed to detect spike and electrographic seizure activity.   The entire record is submitted for computer analysis.  The entire recording is visually reviewed and the times identified by computer analysis as being spikes or seizures are reviewed again.  Compresses spectral analysis (CSA) is also performed on the activity recorded from each individual channel.  This is displayed as a power display of frequencies from 0 to 30 Hz over time.   The CSA is reviewed looking for asymmetries in power between homologous areas of the scalp and then compared with the original EEG recording.     [employer] software is also utilized in the review of this study.  This software suite analyzes the EEG  recording in multiple domains.  Coherence and rhythmicity is computed to identify EEG sections which may contain organized seizures.  Each channel undergoes analysis to detect presence of spike and sharp waves which have special and morphological characteristic of epileptic activity.  The routine EEG recording is converted from spacial into frequency domain.  This is then displayed comparing homologous areas to identify areas of significant asymmetry.  Algorithm to identify non-cortically generated artifact is used to separate eye movement, EMG and other artifact from the EEG.      RECORDING TIMES  Start on 06/01/2023 at 07:01 a.m.  Stop on 06/02/2023 at 07:00 a.m.  A total of 23 hours and 39 minutes of EEG recording is obtained.    EEG FINDINGS  Background activity:   The background is continuous, somewhat disorganized, relatively symmetric mixed frequency theta/delta activity.  There are occasional sporadic generalized discharges with a triphasic morphology which do not organize.    There are five pushbutton activations for myoclonic jerking of the right hand and tremor movements all with no significant changes noted on the electrographic record.    Sleep:  The patient transitions from wakefulness to sleep with the appearance of sleep architecture.    Activation procedures:   Hyperventilation is not performed  Photic stimulation is not performed    Cardiac Monitor:   Heart rate appears generally regular on a single lead EKG.    Impression:   This is an abnormal continuous EEG monitoring study because of a slow and disorganized background with occasional sporadic generalized discharges with a triphasic morphology consistent with diffuse cortical dysfunction and a moderate-severe encephalopathy.  This pattern is frequently seen in the setting of hepatic dysfunction.  There are four pushbutton activations for myoclonic jerking of the right hand or tremor/shivering movements with no EEG correlate.  There are no  definitely epileptiform discharges and no electrographic seizures.    Joan Boss MD PhD [facility]  Neurology-Epilepsy  [facility].

## 2023-06-02 NOTE — ASSESSMENT & PLAN NOTE
Hepatic encephalopathy  - Hepatology consulted and following   - 24 hour urine copper to rule out Ronnie's disease and serum vitamin levels to rule out deficiency contributing to  encephalopathy.    -Lactulose and Rifaximin    -Minimize use of medications that may precipitate encephalopathy (e.g. opioids and benzos).    -CMP and INR daily.    - No current plans for re-initiation of transplant eval   - PETH pending        Daily Assessment

## 2023-06-02 NOTE — HOSPITAL COURSE
06/02/2023 Tachycardic and hypotensive, transferred for development of mostly right hemispheric status, LOC exam remains poor  Intubated for airway protection, EEG has changed to mostly include triphasics with no definite seizure activity per Dr Boss, propofol stopped and AEDs simplified to keppra 1000mg bid only, wean off pressor, give colloids with crystalloid resuscitation  06/03/2023: remains on persistant significant pressor support despite adequate hydration, problems with third spacing and may have pulmonary hypertension as well, consider trial of stress steroids if hgb stabilizes  06/04/2023: levo down to 0.08mcg, start and advance TFs, vaso at 0.04U, ammonia has been stable, slight rise in tbili, check direct bili, hgb and plts improved, no clear source of bleeding, no source of bleeding on CT abdomen, consider superimposed hemolysis  06/05/2023 NAEON. Neurologic exam continues to improve, following commands in all extremities. Levo 0.02, weaning as able. Vaso discontinued, MAP>65. Daily SBT, monitor and hold TF at midnight for possible extubation tomorrow. Hemolysis work up ongoing, trending CBC. Continue CTX for SBP prophylaxis.   06/06/2023 Awake and following commands. SBT with low tidal volumes. Remains intubated for airway protection at this time with possible extubation tomorrow with continued neurologic improvement. Levo discontinued, maintaining SBP<65. Concern for hemolytic anemia related to autoimmune process vs hepatic dysfunction (elevated reticulocyte count and decreased haptoglobin).  06/07/2023 Rested on AC overnight due to low TV and fatigue. SBT with pressure support 10/5 this am, weaning ventilator as tolerated. Continue tube feeds with goal to optimize nutrition. Ammonia elevated, continue lactulose to encourage bowel movement.   06/08/2023 Failed SBT due to apnea. Some breaths on SIMV. Awake and following commands. Ammonia trending down (44) with BMs, continue lactulose. Advance  nutritional support with goal to increase strength towards extubation. Plt transfusions PRN. Cryo for fibrinogen <100.  06/09/2023 Venous US with R brachial and femoral DVT. Dicussed with Hematology with recommendation to start Argatroban with plts>50. Daily SBT with apnea, maintain on SIMV with backup rate of 10. Hyperammonemia resolved.   06/10/2023 patient is more alert and awake, tolerated SBT. Hematology agreed to switch to heparin for DVT given negative for HIT  06/11/2023   On AM rounds, patient was noted to be hypoxic and tachycardic. Hypoxia resolved with ventilator changes but patient continued to appear uncomfortable. Patient lied flat with noted improvement in oxygenation. Patient became acutely hypotensive, tachycardiac and hypoxic not responsive to ventilator changes. IVF bolus + Burton bump x3 + initiation of vasopressin due to concern for hypovolemic shock. Levophed added due to persistent hypertension. L femoral arterial line and R IJ trialysis placed emergently. STAT Hemoglobin 4.4. Received protamine for heparin reversal, 3u Plts and 3 FFP and 2 pRBC. STAT CTA abdo/pelvis with L retroperitoneal hematoma with + spot sign. IR consulted and patient taken class A for diagnostic angiogram with possible emobolization. Pressors weaned after volume resuscitation. Family updated over phone.   06/12/2023 s/p IR embolization of left lumbar and internal iliac branch foci of arterial extravasation. Hemoglobin stable, continue to trend CBC q8 with transfusion of pRBC for Hg<7. Restart trickle feeds. Albumin and lasix for dieresis. Low threshold for antibiotics given risk of SBP and RP hematoma. Unlikely to tolerate AC, will discuss placement of IVC filter with IR.   06/13/2023 Attempted to wean FiO2 overnight with noted drop of pO2. FiO2 increased to 55% and patient received albumin and was diuresed. Weaning this am with ABGs. Remains on fentanyl 12.5, neurologic exam unchanged. Hg stable. Post AM rounds, patient  with acute increase in O2 needs, STAT CTA chest/abd/pelvis to assess for PE vs further hemorrhage stable and without new findings. Continue current management.   06/14/2023 NAEON. Hg stable at 7.6. Plt 44, transfuse for <50. Pending IVC filter per IR for DVT burden with contraindications to AC.  06/15/2023 s/p IVC filter placement. Daily SBT, complicated by anxiety. Remains on spontaneous mode, 10/5, 40% FiO2 with goal to wean as able.   06/16/2023 Rested on AC overnight. Plts low, received 1 pack overnight. Daily SBT with plan to extubate as able.   06/17/2023 extubated on BiPap yesterday, weaning off FiO2. Plts trended down again, received transfusion overnight, no sign of active bleeding.   06/18/2023 Hg 6.7, receiving 1u pRBC. Plan to wean Bipap to high flow NC.  06/19/2023: down to 4L with sats at 100%, still requiring occasional transfusion, otherwise awake and able to interact appropriately with examiner  06/20/2023: Increasing O2 requirements, CXR with atelectasis, continued pancytopenia.  06/21/2023: Continued hypoxia. Bilirubin trending up. Spironolactone increased, lasix 20mg x 1 ordered. D5W bolus. Ammonia level WNL. ABG and CXR ordered.   6/22/23: intubated overnight  6/23/23: Extubated. CPAP QHS. Cryo x1 given.   06/24/2023 tolerating extubation  06/25/2023 tolerating extubation, bili downtrending, continued resp insufficiency  06/26/2023 naeon, tolerating cpap at night, continued resp insufficiency  06/27/2023 transfer to hospital medicine, discussed with team  06/28/2023: NAEON. Lasix 20mg IV x 1 given. Pending SD to hospital medicine.

## 2023-06-02 NOTE — SUBJECTIVE & OBJECTIVE
Interval History:     Review of Systems   Unable to perform ROS: Intubated     Objective:     Vitals:  Temp: 97.2 °F (36.2 °C)  Pulse: (!) 114  Rhythm: sinus tachycardia  BP: (!) 124/57  MAP (mmHg): 82  Resp: 20  SpO2: 100 %  Vent Mode: A/C  Set Rate: 14 BPM  Vt Set: 320 mL  PEEP/CPAP: 5 cmH20  Peak Airway Pressure: 23 cmH20  Mean Airway Pressure: 7.5 cmH20    Temp  Min: 90.5 °F (32.5 °C)  Max: 97.4 °F (36.3 °C)  Pulse  Min: 85  Max: 145  BP  Min: 84/54  Max: 174/78  MAP (mmHg)  Min: 64  Max: 112  Resp  Min: 14  Max: 32  SpO2  Min: 95 %  Max: 100 %    06/01 0701 - 06/02 0700  In: 0   Out: 470 [Urine:470]   Unmeasured Output  Urine Occurrence: 0  Stool Occurrence: 0  Emesis Occurrence: 0  Pad Count: 0        Physical Exam  Constitutional:       Appearance: She is ill-appearing.   HENT:      Head: Normocephalic.   Eyes:      Pupils: Pupils are equal, round, and reactive to light.   Cardiovascular:      Rate and Rhythm: Tachycardia present.   Pulmonary:      Breath sounds: Normal breath sounds.   Abdominal:      Palpations: Abdomen is soft.   Musculoskeletal:      Cervical back: Neck supple.      Right lower leg: Edema present.      Left lower leg: Edema present.   Skin:     General: Skin is warm.   Neurological:      Comments: Opens eyes briefly with noxious  Symmetric withdrawal            Medications:  Continuoussodium chloride 0.45%, Last Rate: 125 mL/hr at 06/02/23 1455  NORepinephrine bitartrate-D5W, Last Rate: 0.4 mcg/kg/min (06/02/23 1405)    Scheduledalbumin human 25%, 25 g, Q8H  [START ON 6/3/2023] ARIPiprazole, 5 mg, Daily  [START ON 6/3/2023] folic acid, 1 mg, Daily  lactulose, 30 g, TID  levETIRAcetam (Keppra) IV (PEDS and ADULTS), 1,000 mg, Q12H  magnesium sulfate IVPB, 2 g, Once  mupirocin, , BID  rifAXIMin, 550 mg, BID  senna-docusate 8.6-50 mg, 1 tablet, BID  sodium bicarbonate, 1,300 mg, BID  [START ON 6/3/2023] thiamine, 100 mg, Daily    PRNacetaminophen, 650 mg, Q8H PRN  aluminum-magnesium  hydroxide-simethicone, 30 mL, QID PRN  dextrose 10%, 12.5 g, PRN  dextrose 10%, 25 g, PRN  dextrose, 15 g, PRN  dextrose, 30 g, PRN  glucagon (human recombinant), 1 mg, PRN  insulin aspart U-100, 0-5 Units, QID (AC + HS) PRN  magnesium oxide, 800 mg, PRN  magnesium oxide, 800 mg, PRN  melatonin, 6 mg, Nightly PRN  naloxone, 0.02 mg, PRN  ondansetron, 4 mg, Q8H PRN  potassium bicarbonate, 35 mEq, PRN  potassium bicarbonate, 50 mEq, PRN  potassium bicarbonate, 60 mEq, PRN  potassium, sodium phosphates, 2 packet, PRN  potassium, sodium phosphates, 2 packet, PRN  potassium, sodium phosphates, 2 packet, PRN  simethicone, 1 tablet, QID PRN  sodium chloride 0.9%, 10 mL, Q8H PRN      Today I personally reviewed pertinent medications, lines/drains/airways, imaging, cardiology results, laboratory results, notably:    Diet  Diet NPO  Diet NPO

## 2023-06-02 NOTE — NURSING
EEG button pressed x3 for increasing nonsustained tremor activity in lower jaw and bilateral arms.

## 2023-06-02 NOTE — ASSESSMENT & PLAN NOTE
Airway watch  Intubated today for airway protection  Difficult intubation due to far anterior airway

## 2023-06-02 NOTE — ASSESSMENT & PLAN NOTE
58F with EtOH induced hepatic cirrhosis, seizure disorder on outpatient LEV and ZNS and bipolar disorder admitted on 5/28 for persistent encephalopathy.    - UTI on admit (Proteus mirabilis), now s/p CTX course  - Hypercalcemia 2/2 lithium toxicity (Lithium changed to Abilify per Psych)  - Hepatic encephalopathy 2/2 to medication non compliance, treated with lactulose and rifaximin    MRI Brain WO was unremarkable for acute neurologic change  NH4 48.  EEG w/ frequent left hemispheric electrographic seizures lasting on average 10-30 seconds with prolonged seizures over 1.5 hours also noted.     - Admit to [facility] for airway watch   - Q1h neurochecks while in ICU  - Q1h vitals while in ICU  - HOB@30  - Keppra 1500mg BID  - Zonisamide 150mg BID  - Vimpat 150 BID  - Thiamine  - MAP>65  - Daily CMP, Mag, Phos - replete electrolytes PRN  - Lipid panel, A1c, Coags reviewed  - Daily CBC, transfuse PRN  - Start SubQ Heparin in when clinically indicated   - PT/OT/SLP as appropriate  - CM/SW consult for dispo planning  06/02: per epilepsy team, no seizures, on keppra only which is 50/50 blood and kidney metabolized, hopefully mental status improves with clearance of ativan

## 2023-06-02 NOTE — CARE UPDATE
Pt intubated with size 7.0 ET tube secured at the 24 cm le. Placed pt on ventilator at documented settings. No respiratory distress noted. Will continue to monitor.

## 2023-06-02 NOTE — PLAN OF CARE
Problem: Adult Inpatient Plan of Care  Goal: Plan of Care Review  Outcome: Ongoing, Progressing  Goal: Patient-Specific Goal (Individualized)  Outcome: Ongoing, Progressing  Goal: Absence of Hospital-Acquired Illness or Injury  Outcome: Ongoing, Progressing  Goal: Optimal Comfort and Wellbeing  Outcome: Ongoing, Progressing  Goal: Readiness for Transition of Care  Outcome: Ongoing, Progressing     Problem: Fluid and Electrolyte Imbalance (Acute Kidney Injury/Impairment)  Goal: Fluid and Electrolyte Balance  Outcome: Ongoing, Progressing     Problem: Oral Intake Inadequate (Acute Kidney Injury/Impairment)  Goal: Optimal Nutrition Intake  Outcome: Ongoing, Progressing     Problem: Renal Function Impairment (Acute Kidney Injury/Impairment)  Goal: Effective Renal Function  Outcome: Ongoing, Progressing     Problem: Infection  Goal: Absence of Infection Signs and Symptoms  Outcome: Ongoing, Progressing     Problem: Skin Injury Risk Increased  Goal: Skin Health and Integrity  Outcome: Ongoing, Progressing     Problem: Fall Injury Risk  Goal: Absence of Fall and Fall-Related Injury  Outcome: Ongoing, Progressing     Problem: Communication Impairment (Mechanical Ventilation, Invasive)  Goal: Effective Communication  Outcome: Ongoing, Progressing     Problem: Device-Related Complication Risk (Mechanical Ventilation, Invasive)  Goal: Optimal Device Function  Outcome: Ongoing, Progressing     Problem: Inability to Wean (Mechanical Ventilation, Invasive)  Goal: Mechanical Ventilation Liberation  Outcome: Ongoing, Progressing     Problem: Nutrition Impairment (Mechanical Ventilation, Invasive)  Goal: Optimal Nutrition Delivery  Outcome: Ongoing, Progressing     Problem: Skin and Tissue Injury (Mechanical Ventilation, Invasive)  Goal: Absence of Device-Related Skin and Tissue Injury  Outcome: Ongoing, Progressing     Problem: Ventilator-Induced Lung Injury (Mechanical Ventilation, Invasive)  Goal: Absence of Ventilator-Induced  Lung Injury  Outcome: Ongoing, Progressing     Problem: Communication Impairment (Artificial Airway)  Goal: Effective Communication  Outcome: Ongoing, Progressing     Problem: Device-Related Complication Risk (Artificial Airway)  Goal: Optimal Device Function  Outcome: Ongoing, Progressing     Problem: Skin and Tissue Injury (Artificial Airway)  Goal: Absence of Device-Related Skin or Tissue Injury  Outcome: Ongoing, Progressing     Problem: Noninvasive Ventilation Acute  Goal: Effective Unassisted Ventilation and Oxygenation  Outcome: Unable to Meet, Plan Revised

## 2023-06-02 NOTE — PLAN OF CARE
[facility] Care Plan    POC reviewed with Marely BAXTER Joy and family at 1400. Pt verbalized understanding. Questions and concerns addressed. No acute events today. Pt progressing toward goals. Will continue to monitor. See below and flowsheets for full assessment and VS info.     -Pt hematologically unstable. Levophed started to keep MAP >65  -Pt intubated and sedated for seizure. Sedation stopped for blood pressure management  -Pt in atrial flutter. Heart rate unstable (110-160 beats per minute). Blood pressure decreases with increased heart rate. Levophed added to manage.  -Albumin ordered q8h  -MD order to decrease levophed with monitoring of cuff instead of arterial line.  -1/2 NS started at 125 ml/hr  -Warmer used to keep body temperature in homeostatic range.        Is this a stroke patient? no    Neuro:  [place] Coma Scale  Best Eye Response: 1-->(E1) none  Best Motor Response: 5-->(M5) localizes pain  Best Verbal Response: 1-->(V1) none  [place] Coma Scale Score: 7  Assessment Qualifiers: patient not sedated/intubated  Pupil PERRLA: no     24 hr Temp:  [90.5 °F (32.5 °C)-98.1 °F (36.7 °C)]     CV:   Rhythm: atrial rhythm  BP goals:   SBP < 180  MAP > 65    Resp:      Vent Mode: A/C  Set Rate: 14 BPM  Oxygen Concentration (%): 60  Vt Set: 320 mL  PEEP/CPAP: 5 cmH20    Plan:  Ventilate while on sedation for seizure control    GI/:     Diet/Nutrition Received: NPO  Last Bowel Movement: 05/31/23  Voiding Characteristics: urethral catheter (bladder)    Intake/Output Summary (Last 24 hours) at 6/2/2023 1736  Last data filed at 6/2/2023 1724  Gross per 24 hour   Intake 4095.13 ml   Output 775 ml   Net 3320.13 ml     Unmeasured Output  Urine Occurrence: 0  Stool Occurrence: 0  Emesis Occurrence: 0  Pad Count: 0    Labs/Accuchecks:  Recent Labs   Lab 06/02/23  0030   WBC 11.17   RBC 2.28*   HGB 7.9*   HCT 27.2*   PLT 56*      Recent Labs   Lab 06/02/23  0030 06/02/23  1532   *  --    K 3.4* 4.0   CO2 22*  --    CL  118*  --    BUN 26*  --    CREATININE 0.9  --    ALKPHOS 203*  --    ALT 79*  --    *  --    BILITOT 3.8*  --       Recent Labs   Lab 05/27/23  1204 05/28/23  1330 06/02/23  0030   INR 2.0*   < > 1.8*   APTT 43.3*  --   --     < > = values in this interval not displayed.    No results for input(s): CPK, CPKMB, TROPONINI, MB in the last 168 hours.    Electrolytes: Electrolytes replaced  Accuchecks: ACHS    Gtts:   sodium chloride 0.45% 125 mL/hr at 06/02/23 1617    NORepinephrine bitartrate-D5W 0.3 mcg/kg/min (06/02/23 1655)       LDA/Wounds:  Lines/Drains/Airways       Drain  Duration                  NG/OG Tube 06/01/23 2200 Right nostril <1 day         Urethral Catheter 06/01/23 2101 Temperature probe <1 day              Airway  Duration                  Airway - Non-Surgical 06/02/23 1118 Endotracheal Tube <1 day       Airway Anesthesia 06/02/23 <1 day              Arterial Line  Duration             Arterial Line 06/02/23 1050 Left Radial <1 day              Peripheral Intravenous Line  Duration                  Midline Catheter Insertion/Assessment  - Single Lumen 05/25/23 1005 Right basilic vein (medial side of arm) other (see comments) 8 days         Peripheral IV - Single Lumen 06/02/23 0030 20 G Anterior;Left Foot <1 day         Peripheral IV - Single Lumen 06/02/23 1130 18 G;1 3/4 in Left Upper Arm <1 day                  Wounds: No  Wound care consulted: No

## 2023-06-02 NOTE — PROGRESS NOTES
[facility]  Neurology-Epilepsy  Progress Note    Patient Name: Marely Hamilton  MRN: [id number]  Admission Date: 5/28/2023  Hospital Length of Stay: 5 days  Code Status: Full Code   Attending Provider: Tony Tapia MD  Primary Care Physician: Viktor Ross MD   Principal Problem:Non-convulsive status epilepticus    Subjective:     Hospital Course:   6/1>6/2: Patient transferred to [facility] for higher level of care. EEG with moderate-severe encephalopathy with occasional sporadic triphasic waves, a finding frequently associated with hepatic dysfunction. Multiple pushbutton activations for brief myoclonus with no EEG correlate. No definite epileptiform discharges and no electrographic seizures. Recommend to discontinue Lacosamide, discontinue Zonisamide. Decrease Levetiracetam to home dose 1000 mg BID. Will continue vEEG.       Interval History: EEG overnight without seizures, intermittent triphasic waves. Multiple push button events for myoclonus/tremor without EEG correlate.     Current Facility-Administered Medications   Medication Dose Route Frequency Provider Last Rate Last Admin    0.45% NaCl infusion   Intravenous Continuous Rafi Baer MD        acetaminophen tablet 650 mg  650 mg Oral Q8H PRN Derick Winston MD   650 mg at 06/02/23 0048    albumin human 25% bottle 25 g  25 g Intravenous Q8H Rafi Baer MD        aluminum-magnesium hydroxide-simethicone 200-200-20 mg/5 mL suspension 30 mL  30 mL Oral QID PRN Derick Winston MD        [START ON 6/3/2023] ARIPiprazole tablet 5 mg  5 mg Per NG tube Daily Cameron Yadav MD        dextrose 10% bolus 125 mL 125 mL  12.5 g Intravenous PRN Derick Winston MD        dextrose 10% bolus 250 mL 250 mL  25 g Intravenous PRN Deirck Winston MD        dextrose 40 % gel 15,000 mg  15 g Oral PRN Derick Winston MD        dextrose 40 % gel 30,000 mg  30 g Oral PRN Derick Winston MD        [START ON 6/3/2023] folic acid  tablet 1 mg  1 mg Per NG tube Daily Cameron Yadav MD        glucagon (human recombinant) injection 1 mg  1 mg Intramuscular PRN Derick Winston MD        insulin aspart U-100 pen 0-5 Units  0-5 Units Subcutaneous QID (AC + HS) PRN Fernando Anderson MD   1 Units at 05/31/23 2120    lactulose 20 gram/30 mL solution Soln 30 g  30 g Per NG tube TID Cameron Yadav MD        levETIRAcetam injection 1,000 mg  1,000 mg Intravenous Q12H Cameron Yadav MD        magnesium oxide tablet 800 mg  800 mg Oral PRN Yasir Mickal, PA-C   800 mg at 06/02/23 0814    magnesium oxide tablet 800 mg  800 mg Oral PRN Yasir Mickal, PA-C        magnesium sulfate 2g in water 50mL IVPB (premix)  2 g Intravenous Once Cameron Yadav MD 25 mL/hr at 06/02/23 1258 2 g at 06/02/23 1258    melatonin tablet 6 mg  6 mg Oral Nightly PRN Derick Winston MD        mupirocin 2 % ointment   Nasal BID Tony Tapia MD   Given at 06/02/23 1148    naloxone 0.4 mg/mL injection 0.02 mg  0.02 mg Intravenous PRN Derick Winston MD        NORepinephrine 4 mg in dextrose 5% 250 mL infusion (premix) (titrating)  0-3 mcg/kg/min Intravenous Continuous Cameron Yadav MD   0.35 mcg/kg/min at 06/02/23 1100    ondansetron injection 4 mg  4 mg Intravenous Q8H PRN Derick Winston MD        potassium bicarbonate disintegrating tablet 35 mEq  35 mEq Oral PRN Yasir Mickal, PA-C   35 mEq at 06/02/23 0558    potassium bicarbonate disintegrating tablet 50 mEq  50 mEq Oral PRN Yasir Mickal, PA-C        potassium bicarbonate disintegrating tablet 60 mEq  60 mEq Oral PRN Yasir Mickal, PA-C        potassium, sodium phosphates 280-160-250 mg packet 2 packet  2 packet Oral PRN Yasir Mickal, PA-C        potassium, sodium phosphates 280-160-250 mg packet 2 packet  2 packet Oral PRN Yasir Mickal, PA-C        potassium, sodium phosphates 280-160-250 mg packet 2 packet  2 packet Oral PRN Yasir Bethea PA-C         rifAXIMin tablet 550 mg  550 mg Per NG tube BID Cameron Yadav MD        senna-docusate 8.6-50 mg per tablet 1 tablet  1 tablet Per NG tube BID Cameron Yadav MD        simethicone chewable tablet 80 mg  1 tablet Oral QID PRN Derick Winston MD        sodium bicarbonate tablet 1,300 mg  1,300 mg Per NG tube BID Cameron Yadav MD        sodium chloride 0.9% flush 10 mL  10 mL Intravenous Q8H PRN Derick Winston MD        [START ON 6/3/2023] thiamine tablet 100 mg  100 mg Per NG tube Daily Cameron Yadav MD         Continuous Infusions:   sodium chloride 0.45%      NORepinephrine bitartrate-D5W         Review of Systems   Unable to perform ROS: Intubated   Objective:     Vital Signs (Most Recent):  Temp: 97.2 °F (36.2 °C) (06/02/23 1405)  Pulse: (!) 114 (06/02/23 1405)  Resp: 20 (06/02/23 1405)  BP: (!) 124/57 (06/02/23 1405)  SpO2: 100 % (06/02/23 1405) Vital Signs (24h Range):  Temp:  [90.5 °F (32.5 °C)-97.4 °F (36.3 °C)] 97.2 °F (36.2 °C)  Pulse:  [] 114  Resp:  [14-32] 20  SpO2:  [95 %-100 %] 100 %  BP: ()/(51-78) 124/57  Arterial Line BP: ()/(52-78) 90/52     Weight: 59.9 kg (132 lb)  Body mass index is 24.14 kg/m².     Physical Exam  Constitutional:       General: She is not in acute distress.     Appearance: She is not diaphoretic.      Interventions: She is sedated and intubated.   HENT:      Head: Normocephalic and atraumatic.      Comments: EEG in place  Eyes:      General: Scleral icterus present.      Conjunctiva/sclera: Conjunctivae normal.      Pupils: Pupils are equal, round, and reactive to light.   Cardiovascular:      Rate and Rhythm: Normal rate.   Pulmonary:      Effort: Pulmonary effort is normal. No respiratory distress. She is intubated.      Comments: Intubated  Abdominal:      General: There is no distension.      Palpations: Abdomen is soft.   Musculoskeletal:         General: No deformity or signs of injury.      Cervical back: Neck supple.  No rigidity.   Skin:     General: Skin is warm and dry.   Psychiatric:      Comments: Unable to assess          NEUROLOGICAL EXAMINATION:     CRANIAL NERVES     CN III, IV, VI   Pupils are equal, round, and reactive to light.       Comatose   CN:   Pupils irregular, reactive to light   No spontaneous eye opening   Face symmetric   Tongue midline   Does not follow commands    Exam findings to suggest seizures:  Myoclonus - no   eye twitching - no   Nystagmus - no   gaze deviation - no   waxy rigidity - no      Significant Labs: All pertinent lab results from the past 24 hours have been reviewed.    Significant Studies: I have reviewed all pertinent imaging results/findings within the past 24 hours.    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 Initially managed at OSH For UTI, hypercalcemia 2/2 lithium toxicity, hepatic encephalopathy 2/2 medication non compliance and transferred to [facility] for further management. Transferred to [facility] 6/1 due to concern for NCSE.    Recommendations:  - Continuous vEEG monitoring - EEG with moderate-severe encephalopathy with occasional sporadic triphasic waves, a finding frequently associated with hepatic dysfunction. Multiple pushbutton activations for brief myoclonus with no EEG correlate. No definite epileptiform discharges and no electrographic seizures.   - Recommend to discontinue Lacosamide, discontinue Zonisamide  - Decrease Levetiracetam to home dose 1000 mg BID   - Patient intubated 6/2, initially placed on Propofol but currently held 2/2 hypotension, agree with discontinuation of anesthetics  - Avoid agents that lower seizure threshold  - Management of infectious/metabolic abnormalities per [facility]  - Seizure precautions      Discussed plan of care with [facility] team, no family available at bedside. Will follow,  please call with any questions.    Hepatic cirrhosis  Hepatic encephalopathy  - Hepatology consulted and following   - 24 hour urine copper to rule out Ronnie's disease and serum vitamin levels to rule out deficiency contributing to  encephalopathy.    -Lactulose and Rifaximin    -Minimize use of medications that may precipitate encephalopathy (e.g. opioids and benzos).    -CMP and INR daily.    - No current plans for re-initiation of transplant eval   - PETH pending         Acute hypoxemic respiratory failure  - Intubated 6/2, vent management per [facility]    Bipolar 1 disorder  - On Aripiprazole 5 mg daily, management per [facility], Psychiatry    Alcohol abuse, in remission  - Hepatology following, repeat PETH in process    Thrombocytopenia  - In setting of hepatic cirrhosis  - [facility] trending daily        VTE Risk Mitigation (From admission, onward)         Ordered     IP VTE LOW RISK PATIENT  Once         05/28/23 1129     Place sequential compression device  Until discontinued         05/28/23 1129                Radha Berry PA-C  Neurology-Epilepsy  [facility]  Staff: Dr. Boss

## 2023-06-02 NOTE — PLAN OF CARE
[facility]  Discharge Reassessment    Primary Care Provider: Viktor Ross MD    Expected Discharge Date: 6/15/2023    Patient in [facility]U.  Per MD:  06/02/2023 Tachycardic and hypotensive, transferred for development of mostly right hemispheric status, LOC exam remains poor  Intubated for airway protection  Patient not medically ready for discharge.       Reassessment (most recent)       Discharge Reassessment - 06/02/23 1621          Discharge Reassessment    Assessment Type Discharge Planning Reassessment     Did the patient's condition or plan change since previous assessment? No     Communicated BRAYAN with patient/caregiver Date not available/Unable to determine     Discharge Plan A Long-term acute care facility (LTAC)     Discharge Plan B Rehab     DME Needed Upon Discharge  none     Why the patient remains in the hospital Requires continued medical care                   Dixie Faith RN, CCRN-K, [facility]  Neuro-Critical Care   [phone]

## 2023-06-02 NOTE — NURSING
1055 - MD Keyur & MD Vicenta with NCC team at bedside. Preparing patient for intubation via glydescope  1058 - 20mg etomidate given  1059 - 60mg Lizandro given as RSI  1100 - easy ambu bagging, VSS. Weaning down levo infusion  1101 - intubation attempt via glydescope with 7.5 size ETT.   1104 - intubation 2nd attempt  1106 - ambu bagging, VSS.   1108 - bougie requested during this attempt  1110 - unable to intubate d/t anatomical difficulties. Anesthesia team called to bedside. Ventilation via ambu bag continued VSS, continuing to wean down levo infusion.   1113 - Celestina Ortega with anesthesia team at bedside at time.   1116 - LIZ Reynaga MD with anesthesia at bedside.   1118 - intubation complete via Buitrago 24 @ lip, 7.0 size ETT. EtCo color change and bilateral breath sounds auscultated. Chest xray ordered for confirmation. VSS.

## 2023-06-02 NOTE — HOSPITAL COURSE
6/1>6/2: Patient transferred to [facility] for higher level of care. EEG with moderate-severe encephalopathy with occasional sporadic triphasic waves, a finding frequently associated with hepatic dysfunction. Multiple pushbutton activations for brief myoclonus with no EEG correlate. No definite epileptiform discharges and no electrographic seizures. Recommend to discontinue Lacosamide, discontinue Zonisamide. Decrease Levetiracetam to home dose 1000 mg BID. Will continue vEEG.

## 2023-06-02 NOTE — ASSESSMENT & PLAN NOTE
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 Initially managed at OSH For UTI, hypercalcemia 2/2 lithium toxicity, hepatic encephalopathy 2/2 medication non compliance and [place] [facility] [place]. Transferred to [facility] 6/1 due to concern for NCSE.    Recommendations:  - Continuous vEEG monitoring - EEG with moderate-severe encephalopathy with occasional sporadic triphasic waves, a finding frequently associated with hepatic dysfunction. Multiple pushbutton activations for brief myoclonus with no EEG correlate. No definite epileptiform discharges and no electrographic seizures.   - Recommend to discontinue Lacosamide, discontinue Zonisamide  - Decrease Levetiracetam to home dose 1000 mg BID   - Patient intubated 6/2, initially placed on Propofol but currently held 2/2 hypotension, agree with discontinuation of anesthetics  - Avoid agents that lower seizure threshold  - Management of infectious/metabolic abnormalities per [facility]  - Seizure precautions      Discussed plan of care with [facility] team, no family available at bedside. Will follow, please call with any questions.

## 2023-06-02 NOTE — PT/OT/SLP DISCHARGE
Occupational Therapy Discharge Summary    Marely Hamilton  MRN: [id number]   Principal Problem: Non-convulsive status epilepticus      Patient Discharged from acute Occupational Therapy on 6/2/23.  Please refer to prior OT note dated 5/30/23 for functional status.    Assessment:      Patient has not met goals. Pt not seen on this date & orders discontinued due to transfer to ICU.    Objective:     GOALS:   Multidisciplinary Problems       Occupational Therapy Goals          Problem: Occupational Therapy    Goal Priority Disciplines Outcome Interventions   Occupational Therapy Goal     OT, PT/OT Ongoing, Progressing    Description: Goals to be met by: 6/6/23    Patient will increase functional independence with ADLs by performing:    Grooming while EOB with Moderate Assistance.  Sitting at edge of bed x10 minutes with Minimal Assistance.  Rolling to Bilateral with Minimal Assistance.   Supine to sit with Moderate Assistance.  Toilet transfer to bedside commode with Moderate Assistance.                         Reasons for Discontinuation of Therapy Services  Transfer to alternate level of care.      Plan:     Patient Discharged to: pt transferred to ICU therefore will need new OT orders post transfer to ICU when medically appropriate to resume OT.    6/2/2023

## 2023-06-02 NOTE — PROGRESS NOTES
[facility]  Neurocritical Care  Progress Note    Admit Date: 5/28/2023  Service Date: 06/02/2023  Length of Stay: 5    Subjective:     Chief Complaint: Non-convulsive status epilepticus    History of Present Illness: Marely Hamilton is a 58 year old female with a medical history significant for EtOH induced hepatic cirrhosis, seizure disorder on outpatient LEV and ZNS and bipolar disorder who was admitted to [facility] on 5/28 as a transfer from OSH for evaluation of persistent encephalopathy concerning for NCSE vs hepatic encephalopathy. History is obtained from chart as patient is unresponsive and unable to assist. Initially presented to [facility] on 5/18 for encephalopathy and thought to be multifactorial including UTI (Proteus mirabilis s/p CTX course), hypercalcemia 2/2 lithium toxicity (Lithium changed to Abilify per Psych), as well as hepatic encephalopathy secondary to medication non compliance. She was treated with lactulose and rifaximin with no improvement in her mental status. EEG showed generalized slowing as well as triphasic discharges in the left hemisphere. MRI Brain WO was unremarkable for acute neurologic change. Decision was made to transfer patient to [facility] for higher level of care and ongoing evaluation and management. On arrival, mental status exam has waxed and waned with patient being intermittently verbal and following commands. NH4 48.    NCC is consulted on hospital day 4 for admission after EEG showed frequent left hemispheric electrographic seizures lasting on average 10-30 seconds with prolonged seizures over 1.5 hours also noted. Per chart review, patient home dose of LEV increased from 1000mg to 1500mg BID, ZNS increased to 200mg. Vimpat 150mg BID added per General Neurology (had not been given prior to consult). On initial evaluation, patient is not responsive to voice or pain. Upward gaze noted. Decision made to transfer patient to [facility] for higher level of care and  airway watch.       Hospital Course: 06/02/2023 Tachycardic and hypotensive, transferred for development of mostly right hemispheric status, LOC exam remains poor  Intubated for airway protection, EEG has changed to mostly include triphasics with no definite seizure activity per Dr Boss, propofol stopped and AEDs simplified to keppra 1000mg bid only, wean off pressor, give colloids with crystalloid resuscitation        Interval History:     Review of Systems   Unable to perform ROS: Intubated     Objective:     Vitals:  Temp: 97.2 °F (36.2 °C)  Pulse: (!) 114  Rhythm: sinus tachycardia  BP: (!) 124/57  MAP (mmHg): 82  Resp: 20  SpO2: 100 %  Vent Mode: A/C  Set Rate: 14 BPM  Vt Set: 320 mL  PEEP/CPAP: 5 cmH20  Peak Airway Pressure: 23 cmH20  Mean Airway Pressure: 7.5 cmH20    Temp  Min: 90.5 °F (32.5 °C)  Max: 97.4 °F (36.3 °C)  Pulse  Min: 85  Max: 145  BP  Min: 84/54  Max: 174/78  MAP (mmHg)  Min: 64  Max: 112  Resp  Min: 14  Max: 32  SpO2  Min: 95 %  Max: 100 %    06/01 0701 - 06/02 0700  In: 0   Out: 470 [Urine:470]   Unmeasured Output  Urine Occurrence: 0  Stool Occurrence: 0  Emesis Occurrence: 0  Pad Count: 0        Physical Exam  Constitutional:       Appearance: She is ill-appearing.   HENT:      Head: Normocephalic.   Eyes:      Pupils: Pupils are equal, round, and reactive to light.   Cardiovascular:      Rate and Rhythm: Tachycardia present.   Pulmonary:      Breath sounds: Normal breath sounds.   Abdominal:      Palpations: Abdomen is soft.   Musculoskeletal:      Cervical back: Neck supple.      Right lower leg: Edema present.      Left lower leg: Edema present.   Skin:     General: Skin is warm.   Neurological:      Comments: Opens eyes briefly with noxious  Symmetric withdrawal            Medications:  Continuoussodium chloride 0.45%, Last Rate: 125 mL/hr at 06/02/23 1455  NORepinephrine bitartrate-D5W, Last Rate: 0.4 mcg/kg/min (06/02/23 1405)    Scheduledalbumin human 25%, 25 g, Q8H  [START ON  6/3/2023] ARIPiprazole, 5 mg, Daily  [START ON 6/3/2023] folic acid, 1 mg, Daily  lactulose, 30 g, TID  levETIRAcetam (Keppra) IV (PEDS and ADULTS), 1,000 mg, Q12H  magnesium sulfate IVPB, 2 g, Once  mupirocin, , BID  rifAXIMin, 550 mg, BID  senna-docusate 8.6-50 mg, 1 tablet, BID  sodium bicarbonate, 1,300 mg, BID  [START ON 6/3/2023] thiamine, 100 mg, Daily    PRNacetaminophen, 650 mg, Q8H PRN  aluminum-magnesium hydroxide-simethicone, 30 mL, QID PRN  dextrose 10%, 12.5 g, PRN  dextrose 10%, 25 g, PRN  dextrose, 15 g, PRN  dextrose, 30 g, PRN  glucagon (human recombinant), 1 mg, PRN  insulin aspart U-100, 0-5 Units, QID (AC + HS) PRN  magnesium oxide, 800 mg, PRN  magnesium oxide, 800 mg, PRN  melatonin, 6 mg, Nightly PRN  naloxone, 0.02 mg, PRN  ondansetron, 4 mg, Q8H PRN  potassium bicarbonate, 35 mEq, PRN  potassium bicarbonate, 50 mEq, PRN  potassium bicarbonate, 60 mEq, PRN  potassium, sodium phosphates, 2 packet, PRN  potassium, sodium phosphates, 2 packet, PRN  potassium, sodium phosphates, 2 packet, PRN  simethicone, 1 tablet, QID PRN  sodium chloride 0.9%, 10 mL, Q8H PRN      Today I personally reviewed pertinent medications, lines/drains/airways, imaging, cardiology results, laboratory results, notably:    Diet  Diet NPO  Diet NPO          Assessment/Plan:     Neuro  * Non-convulsive status epilepticus  58F with EtOH induced hepatic cirrhosis, seizure disorder on outpatient LEV and ZNS and bipolar disorder admitted on 5/28 for persistent encephalopathy.    - UTI on admit (Proteus mirabilis), now s/p CTX course  - Hypercalcemia 2/2 lithium toxicity (Lithium changed to Abilify per Psych)  - Hepatic encephalopathy 2/2 to medication non compliance, treated with lactulose and rifaximin    MRI Brain WO was unremarkable for acute neurologic change  NH4 48.  EEG w/ frequent left hemispheric electrographic seizures lasting on average 10-30 seconds with prolonged seizures over 1.5 hours also noted.     - Admit to  NCC for airway watch   - Q1h neurochecks while in ICU  - Q1h vitals while in ICU  - HOB@30  - Keppra 1500mg BID  - Zonisamide 150mg BID  - Vimpat 150 BID  - Thiamine  - MAP>65  - Daily CMP, Mag, Phos - replete electrolytes PRN  - Lipid panel, A1c, Coags reviewed  - Daily CBC, transfuse PRN  - Start SubQ Heparin in when clinically indicated   - PT/OT/SLP as appropriate  - CM/SW consult for dispo planning  06/02: per epilepsy team, no seizures, on keppra only which is 50/50 blood and kidney metabolized, hopefully mental status improves with clearance of ativan    Pulmonary  Acute hypoxemic respiratory failure  Airway watch  Intubated today for airway protection  Difficult intubation due to far anterior airway    Renal/  Hypernatremia  Daily CMP  likly secondary to chronic liver disease    Hematology  Thrombocytopenia  Likely secondary to hepatic cirrhosis and development of splenomegaly    Endocrine  Hyperammonemia  See Non-convulsive status epilepticus  Controlled on present regimen          The patient is being Prophylaxed for:  Venous Thromboembolism with: Mechanical  Stress Ulcer with: H2B  Ventilator Pneumonia with: chlorhexidine oral care    Activity Orders          Diet NPO: NPO starting at 06/01 1055    Turn patient starting at 05/28 2253    Progressive Mobility Protocol (mobilize patient to their highest level of functioning at least twice daily) starting at 05/28 2000      CRC 38 min not including procedure time  Full Code    Rafi Veronica MD  Neurocritical Care  [facility]

## 2023-06-02 NOTE — PLAN OF CARE
[facility] Care Plan    POC reviewed with Marely Hamilton and family at 0300. Pt verbalized understanding. Questions and concerns addressed. No acute events overnight. Pt progressing toward goals. Will continue to monitor. See below and flowsheets for full assessment and VS info.     -R nare NGT placed; XR confirmed; ok to use  -Saldaña replaced  -cEEG in place  -PRN tylenol given x1      Is this a stroke patient? no    Neuro:  Melville Coma Scale  Best Eye Response: 3-->(E3) to speech  Best Motor Response: 5-->(M5) localizes pain  Best Verbal Response: 4-->(V4) confused  Cheryl Coma Scale Score: 12  Assessment Qualifiers: patient not sedated/intubated  Pupil PERRLA: no     24hr Temp:  [96.8 °F (36 °C)-98.2 °F (36.8 °C)]     CV:   Rhythm: normal sinus rhythm  BP goals:   SBP < 180  MAP > 65    Resp:           Plan: N/A    GI/:     Diet/Nutrition Received: NPO  Last Bowel Movement: 05/31/23  Voiding Characteristics: urethral catheter (bladder)    Intake/Output Summary (Last 24 hours) at 6/2/2023 0320  Last data filed at 6/2/2023 0301  Gross per 24 hour   Intake 0 ml   Output 525 ml   Net -525 ml     Unmeasured Output  Urine Occurrence: 0  Stool Occurrence: 0  Emesis Occurrence: 0  Pad Count: 0    Labs/Accuchecks:  Recent Labs   Lab 06/02/23  0030   WBC 11.17   RBC 2.28*   HGB 7.9*   HCT 27.2*   PLT 56*      Recent Labs   Lab 06/02/23  0030   *   K 3.4*   CO2 22*   *   BUN 26*   CREATININE 0.9   ALKPHOS 203*   ALT 79*   *   BILITOT 3.8*      Recent Labs   Lab 05/27/23  1204 05/28/23  1330 06/02/23  0030   INR 2.0*   < > 1.8*   APTT 43.3*  --   --     < > = values in this interval not displayed.    No results for input(s): CPK, CPKMB, TROPONINI, MB in the last 168 hours.    Electrolytes: No replacement orders  Accuchecks: ACHS    Gtts:      LDA/Wounds:  Lines/Drains/Airways       Drain  Duration                  Urethral Catheter 05/30/23 1242 2 days              Peripheral Intravenous Line  Duration                   Midline Catheter Insertion/Assessment  - Single Lumen 05/25/23 1005 Right basilic vein (medial side of arm) other (see comments) 7 days         Peripheral IV - Single Lumen 06/02/23 0030 20 G Anterior;Left Foot <1 day                  Wounds: No  Wound care consulted: No

## 2023-06-02 NOTE — SUBJECTIVE & OBJECTIVE
Interval History: EEG overnight without seizures, intermittent triphasic waves. Multiple push button events for myoclonus/tremor without EEG correlate.     Current Facility-Administered Medications   Medication Dose Route Frequency Provider Last Rate Last Admin    0.45% NaCl infusion   Intravenous Continuous Rafi Baer MD        acetaminophen tablet 650 mg  650 mg Oral Q8H PRN Derick Winston MD   650 mg at 06/02/23 0048    albumin human 25% bottle 25 g  25 g Intravenous Q8H Rafi Baer MD        aluminum-magnesium hydroxide-simethicone 200-200-20 mg/5 mL suspension 30 mL  30 mL Oral QID PRN Derick Winston MD        [START ON 6/3/2023] ARIPiprazole tablet 5 mg  5 mg Per NG tube Daily Cameron Yadav MD        dextrose 10% bolus 125 mL 125 mL  12.5 g Intravenous PRN Derick Winston MD        dextrose 10% bolus 250 mL 250 mL  25 g Intravenous PRN Derick Winston MD        dextrose 40 % gel 15,000 mg  15 g Oral PRN Derick Winston MD        dextrose 40 % gel 30,000 mg  30 g Oral PRN Derick Winston MD        [START ON 6/3/2023] folic acid tablet 1 mg  1 mg Per NG tube Daily Cameron Yadav MD        glucagon (human recombinant) injection 1 mg  1 mg Intramuscular PRN Derick Winston MD        insulin aspart U-100 pen 0-5 Units  0-5 Units Subcutaneous QID (AC + HS) PRN Fernando Anderson MD   1 Units at 05/31/23 2120    lactulose 20 gram/30 mL solution Soln 30 g  30 g Per NG tube TID Cameron Yadav MD        levETIRAcetam injection 1,000 mg  1,000 mg Intravenous Q12H Cameron Yadav MD        magnesium oxide tablet 800 mg  800 mg Oral PRN Yasir Bethea PA-C   800 mg at 06/02/23 0814    magnesium oxide tablet 800 mg  800 mg Oral PRN Yasir Bethea PA-C        magnesium sulfate 2g in water 50mL IVPB (premix)  2 g Intravenous Once Cameron Yadav MD 25 mL/hr at 06/02/23 1258 2 g at 06/02/23 1258    melatonin tablet 6 mg  6 mg Oral Nightly PRN Derick Winston MD         mupirocin 2 % ointment   Nasal BID Tony Tapia MD   Given at 06/02/23 1148    naloxone 0.4 mg/mL injection 0.02 mg  0.02 mg Intravenous PRN Derick Winston MD        NORepinephrine 4 mg in dextrose 5% 250 mL infusion (premix) (titrating)  0-3 mcg/kg/min Intravenous Continuous Cameron Yadav MD   0.35 mcg/kg/min at 06/02/23 1100    ondansetron injection 4 mg  4 mg Intravenous Q8H PRN Derick Winston MD        potassium bicarbonate disintegrating tablet 35 mEq  35 mEq Oral PRN Yasir Mickal, PA-C   35 mEq at 06/02/23 0558    potassium bicarbonate disintegrating tablet 50 mEq  50 mEq Oral PRN Yasir Mickal, PA-C        potassium bicarbonate disintegrating tablet 60 mEq  60 mEq Oral PRN Yasir Mickal, PA-C        potassium, sodium phosphates 280-160-250 mg packet 2 packet  2 packet Oral PRN Yasir Mickal, PA-C        potassium, sodium phosphates 280-160-250 mg packet 2 packet  2 packet Oral PRN Yasir Mickal, PA-C        potassium, sodium phosphates 280-160-250 mg packet 2 packet  2 packet Oral PRN Yasir Mickal, PA-C        rifAXIMin tablet 550 mg  550 mg Per NG tube BID Cameron Yadav MD        senna-docusate 8.6-50 mg per tablet 1 tablet  1 tablet Per NG tube BID Cameron Yadav MD        simethicone chewable tablet 80 mg  1 tablet Oral QID PRN Derick Winston MD        sodium bicarbonate tablet 1,300 mg  1,300 mg Per NG tube BID Cameron Yadav MD        sodium chloride 0.9% flush 10 mL  10 mL Intravenous Q8H PRN Derick Winston MD        [START ON 6/3/2023] thiamine tablet 100 mg  100 mg Per NG tube Daily Cameron Yadav MD         Continuous Infusions:   sodium chloride 0.45%      NORepinephrine bitartrate-D5W         Review of Systems   Unable to perform ROS: Intubated   Objective:     Vital Signs (Most Recent):  Temp: 97.2 °F (36.2 °C) (06/02/23 1405)  Pulse: (!) 114 (06/02/23 1405)  Resp: 20 (06/02/23 1405)  BP: (!) 124/57 (06/02/23 1405)  SpO2: 100 % (06/02/23 1405)  Vital Signs (24h Range):  Temp:  [90.5 °F (32.5 °C)-97.4 °F (36.3 °C)] 97.2 °F (36.2 °C)  Pulse:  [] 114  Resp:  [14-32] 20  SpO2:  [95 %-100 %] 100 %  BP: ()/(51-78) 124/57  Arterial Line BP: ()/(52-78) 90/52     Weight: 59.9 kg (132 lb)  Body mass index is 24.14 kg/m².     Physical Exam  Constitutional:       General: She is not in acute distress.     Appearance: She is not diaphoretic.      Interventions: She is sedated and intubated.   HENT:      Head: Normocephalic and atraumatic.      Comments: EEG in place  Eyes:      General: Scleral icterus present.      Conjunctiva/sclera: Conjunctivae normal.      Pupils: Pupils are equal, round, and reactive to light.   Cardiovascular:      Rate and Rhythm: Normal rate.   Pulmonary:      Effort: Pulmonary effort is normal. No respiratory distress. She is intubated.      Comments: Intubated  Abdominal:      General: There is no distension.      Palpations: Abdomen is soft.   Musculoskeletal:         General: No deformity or signs of injury.      Cervical back: Neck supple. No rigidity.   Skin:     General: Skin is warm and dry.   Psychiatric:      Comments: Unable to assess          NEUROLOGICAL EXAMINATION:     CRANIAL NERVES     CN III, IV, VI   Pupils are equal, round, and reactive to light.       Comatose   CN:   Pupils irregular, reactive to light   No spontaneous eye opening   Face symmetric   Tongue midline   Does not follow commands    Exam findings to suggest seizures:  Myoclonus - no   eye twitching - no   Nystagmus - no   gaze deviation - no   waxy rigidity - no      Significant Labs: All pertinent lab results from the past 24 hours have been reviewed.    Significant Studies: I have reviewed all pertinent imaging results/findings within the past 24 hours.

## 2023-06-03 PROBLEM — K72.00 ACUTE LIVER FAILURE WITHOUT HEPATIC COMA: Status: ACTIVE | Noted: 2023-06-03

## 2023-06-03 PROBLEM — D64.9 ANEMIA: Status: ACTIVE | Noted: 2023-06-03

## 2023-06-03 LAB
ABO + RH BLD: NORMAL
ALBUMIN SERPL BCP-MCNC: 2.4 G/DL (ref 3.5–5.2)
ALBUMIN SERPL BCP-MCNC: NORMAL G/DL (ref 3.5–5.2)
ALLENS TEST: ABNORMAL
ALP SERPL-CCNC: 150 U/L (ref 55–135)
ALP SERPL-CCNC: NORMAL U/L (ref 55–135)
ALT SERPL W/O P-5'-P-CCNC: 54 U/L (ref 10–44)
ALT SERPL W/O P-5'-P-CCNC: NORMAL U/L (ref 10–44)
AMMONIA PLAS-SCNC: 37 UMOL/L (ref 10–50)
ANION GAP SERPL CALC-SCNC: 9 MMOL/L (ref 8–16)
ANION GAP SERPL CALC-SCNC: NORMAL MMOL/L (ref 8–16)
ANISOCYTOSIS BLD QL SMEAR: SLIGHT
APTT PPP: 43 SEC (ref 21–32)
AST SERPL-CCNC: 69 U/L (ref 10–40)
AST SERPL-CCNC: NORMAL U/L (ref 10–40)
BASO STIPL BLD QL SMEAR: ABNORMAL
BASOPHILS # BLD AUTO: 0.01 K/UL (ref 0–0.2)
BASOPHILS # BLD AUTO: ABNORMAL K/UL (ref 0–0.2)
BASOPHILS NFR BLD: 0 % (ref 0–1.9)
BASOPHILS NFR BLD: 0.1 % (ref 0–1.9)
BILIRUB SERPL-MCNC: 3.5 MG/DL (ref 0.1–1)
BILIRUB SERPL-MCNC: NORMAL MG/DL (ref 0.1–1)
BLD GP AB SCN CELLS X3 SERPL QL: NORMAL
BLD PROD TYP BPU: NORMAL
BLOOD UNIT EXPIRATION DATE: NORMAL
BLOOD UNIT TYPE CODE: 5100
BLOOD UNIT TYPE CODE: 6200
BLOOD UNIT TYPE CODE: 6200
BLOOD UNIT TYPE: NORMAL
BUN SERPL-MCNC: 21 MG/DL (ref 6–20)
BUN SERPL-MCNC: NORMAL MG/DL (ref 6–20)
BURR CELLS BLD QL SMEAR: ABNORMAL
CA-I BLDV-SCNC: 1.03 MMOL/L (ref 1.06–1.42)
CALCIUM SERPL-MCNC: 6.9 MG/DL (ref 8.7–10.5)
CALCIUM SERPL-MCNC: NORMAL MG/DL (ref 8.7–10.5)
CHLORIDE SERPL-SCNC: 115 MMOL/L (ref 95–110)
CHLORIDE SERPL-SCNC: NORMAL MMOL/L (ref 95–110)
CO2 SERPL-SCNC: 19 MMOL/L (ref 23–29)
CO2 SERPL-SCNC: NORMAL MMOL/L (ref 23–29)
CODING SYSTEM: NORMAL
CREAT SERPL-MCNC: 0.8 MG/DL (ref 0.5–1.4)
CREAT SERPL-MCNC: NORMAL MG/DL (ref 0.5–1.4)
CROSSMATCH INTERPRETATION: NORMAL
DACRYOCYTES BLD QL SMEAR: ABNORMAL
DELSYS: ABNORMAL
DIFFERENTIAL METHOD: ABNORMAL
DISPENSE STATUS: NORMAL
EOSINOPHIL # BLD AUTO: 0.1 K/UL (ref 0–0.5)
EOSINOPHIL # BLD AUTO: 0.3 K/UL (ref 0–0.5)
EOSINOPHIL # BLD AUTO: ABNORMAL K/UL (ref 0–0.5)
EOSINOPHIL NFR BLD: 0.8 % (ref 0–8)
EOSINOPHIL NFR BLD: 0.8 % (ref 0–8)
EOSINOPHIL NFR BLD: 1 % (ref 0–8)
EOSINOPHIL NFR BLD: 1.1 % (ref 0–8)
EOSINOPHIL NFR BLD: 2.8 % (ref 0–8)
ERYTHROCYTE [DISTWIDTH] IN BLOOD BY AUTOMATED COUNT: 23.2 % (ref 11.5–14.5)
ERYTHROCYTE [DISTWIDTH] IN BLOOD BY AUTOMATED COUNT: 24.2 % (ref 11.5–14.5)
ERYTHROCYTE [DISTWIDTH] IN BLOOD BY AUTOMATED COUNT: 24.8 % (ref 11.5–14.5)
ERYTHROCYTE [DISTWIDTH] IN BLOOD BY AUTOMATED COUNT: 25 % (ref 11.5–14.5)
ERYTHROCYTE [DISTWIDTH] IN BLOOD BY AUTOMATED COUNT: 25.2 % (ref 11.5–14.5)
ERYTHROCYTE [SEDIMENTATION RATE] IN BLOOD BY WESTERGREN METHOD: 14 MM/H
EST. GFR  (AFRICAN AMERICAN): NORMAL ML/MIN/1.73 M^2
EST. GFR  (NO RACE VARIABLE): >60 ML/MIN/1.73 M^2
EST. GFR  (NO RACE VARIABLE): NORMAL ML/MIN/1.73 M^2
EST. GFR  (NON AFRICAN AMERICAN): NORMAL ML/MIN/1.73 M^2
FIBRINOGEN PPP-MCNC: 119 MG/DL (ref 182–400)
FIO2: 60
GLUCOSE SERPL-MCNC: 197 MG/DL (ref 70–110)
GLUCOSE SERPL-MCNC: NORMAL MG/DL (ref 70–110)
HCO3 UR-SCNC: 20.7 MMOL/L (ref 24–28)
HCT VFR BLD AUTO: 18.4 % (ref 37–48.5)
HCT VFR BLD AUTO: 22.2 % (ref 37–48.5)
HCT VFR BLD AUTO: 23.3 % (ref 37–48.5)
HCT VFR BLD AUTO: 24.6 % (ref 37–48.5)
HCT VFR BLD AUTO: 24.7 % (ref 37–48.5)
HGB BLD-MCNC: 5.3 G/DL (ref 12–16)
HGB BLD-MCNC: 6.9 G/DL (ref 12–16)
HGB BLD-MCNC: 7.4 G/DL (ref 12–16)
HGB BLD-MCNC: 7.5 G/DL (ref 12–16)
HGB BLD-MCNC: 7.7 G/DL (ref 12–16)
HYPOCHROMIA BLD QL SMEAR: ABNORMAL
IMM GRANULOCYTES # BLD AUTO: 0.03 K/UL (ref 0–0.04)
IMM GRANULOCYTES # BLD AUTO: 0.05 K/UL (ref 0–0.04)
IMM GRANULOCYTES # BLD AUTO: ABNORMAL K/UL (ref 0–0.04)
IMM GRANULOCYTES NFR BLD AUTO: 0.3 % (ref 0–0.5)
IMM GRANULOCYTES NFR BLD AUTO: 0.6 % (ref 0–0.5)
IMM GRANULOCYTES NFR BLD AUTO: ABNORMAL % (ref 0–0.5)
INR PPP: 1.8 (ref 0.8–1.2)
INR PPP: 2.1 (ref 0.8–1.2)
LYMPHOCYTES # BLD AUTO: 0.5 K/UL (ref 1–4.8)
LYMPHOCYTES # BLD AUTO: 0.6 K/UL (ref 1–4.8)
LYMPHOCYTES # BLD AUTO: 0.7 K/UL (ref 1–4.8)
LYMPHOCYTES # BLD AUTO: 0.8 K/UL (ref 1–4.8)
LYMPHOCYTES # BLD AUTO: ABNORMAL K/UL (ref 1–4.8)
LYMPHOCYTES NFR BLD: 4 % (ref 18–48)
LYMPHOCYTES NFR BLD: 6.2 % (ref 18–48)
LYMPHOCYTES NFR BLD: 6.5 % (ref 18–48)
LYMPHOCYTES NFR BLD: 7.9 % (ref 18–48)
LYMPHOCYTES NFR BLD: 8.8 % (ref 18–48)
MAGNESIUM SERPL-MCNC: 2.4 MG/DL (ref 1.6–2.6)
MAGNESIUM SERPL-MCNC: NORMAL MG/DL (ref 1.6–2.6)
MCH RBC QN AUTO: 31.8 PG (ref 27–31)
MCH RBC QN AUTO: 31.9 PG (ref 27–31)
MCH RBC QN AUTO: 32.1 PG (ref 27–31)
MCH RBC QN AUTO: 32.2 PG (ref 27–31)
MCH RBC QN AUTO: 34.6 PG (ref 27–31)
MCHC RBC AUTO-ENTMCNC: 28.8 G/DL (ref 32–36)
MCHC RBC AUTO-ENTMCNC: 30.4 G/DL (ref 32–36)
MCHC RBC AUTO-ENTMCNC: 31.1 G/DL (ref 32–36)
MCHC RBC AUTO-ENTMCNC: 31.3 G/DL (ref 32–36)
MCHC RBC AUTO-ENTMCNC: 31.8 G/DL (ref 32–36)
MCV RBC AUTO: 101 FL (ref 82–98)
MCV RBC AUTO: 103 FL (ref 82–98)
MCV RBC AUTO: 103 FL (ref 82–98)
MCV RBC AUTO: 105 FL (ref 82–98)
MCV RBC AUTO: 120 FL (ref 82–98)
MODE: ABNORMAL
MONOCYTES # BLD AUTO: 0.4 K/UL (ref 0.3–1)
MONOCYTES # BLD AUTO: 0.5 K/UL (ref 0.3–1)
MONOCYTES # BLD AUTO: ABNORMAL K/UL (ref 0.3–1)
MONOCYTES NFR BLD: 1 % (ref 4–15)
MONOCYTES NFR BLD: 4.7 % (ref 4–15)
MONOCYTES NFR BLD: 4.9 % (ref 4–15)
MONOCYTES NFR BLD: 5.2 % (ref 4–15)
MONOCYTES NFR BLD: 5.2 % (ref 4–15)
NEUTROPHILS # BLD AUTO: 7.5 K/UL (ref 1.8–7.7)
NEUTROPHILS # BLD AUTO: 7.7 K/UL (ref 1.8–7.7)
NEUTROPHILS # BLD AUTO: 7.9 K/UL (ref 1.8–7.7)
NEUTROPHILS # BLD AUTO: 8 K/UL (ref 1.8–7.7)
NEUTROPHILS NFR BLD: 83.1 % (ref 38–73)
NEUTROPHILS NFR BLD: 85.4 % (ref 38–73)
NEUTROPHILS NFR BLD: 87.3 % (ref 38–73)
NEUTROPHILS NFR BLD: 87.4 % (ref 38–73)
NEUTROPHILS NFR BLD: 90 % (ref 38–73)
NEUTS BAND NFR BLD MANUAL: 4 %
NRBC BLD-RTO: 0 /100 WBC
OVALOCYTES BLD QL SMEAR: ABNORMAL
PCO2 BLDA: 32.7 MMHG (ref 35–45)
PEEP: 5
PH SMN: 7.41 [PH] (ref 7.35–7.45)
PHOSPHATE SERPL-MCNC: 2.2 MG/DL (ref 2.7–4.5)
PHOSPHATE SERPL-MCNC: 2.5 MG/DL (ref 2.7–4.5)
PHOSPHATE SERPL-MCNC: NORMAL MG/DL (ref 2.7–4.5)
PLATELET # BLD AUTO: 31 K/UL (ref 150–450)
PLATELET # BLD AUTO: 32 K/UL (ref 150–450)
PLATELET # BLD AUTO: 32 K/UL (ref 150–450)
PLATELET # BLD AUTO: 41 K/UL (ref 150–450)
PLATELET # BLD AUTO: 50 K/UL (ref 150–450)
PLATELET BLD QL SMEAR: ABNORMAL
PMV BLD AUTO: 10.2 FL (ref 9.2–12.9)
PMV BLD AUTO: 10.3 FL (ref 9.2–12.9)
PMV BLD AUTO: 10.8 FL (ref 9.2–12.9)
PMV BLD AUTO: 11.4 FL (ref 9.2–12.9)
PMV BLD AUTO: 9.8 FL (ref 9.2–12.9)
PO2 BLDA: 91 MMHG (ref 80–100)
POC BE: -4 MMOL/L
POC SATURATED O2: 97 % (ref 95–100)
POC TCO2: 22 MMOL/L (ref 23–27)
POCT GLUCOSE: 146 MG/DL (ref 70–110)
POCT GLUCOSE: 164 MG/DL (ref 70–110)
POCT GLUCOSE: 183 MG/DL (ref 70–110)
POCT GLUCOSE: 185 MG/DL (ref 70–110)
POIKILOCYTOSIS BLD QL SMEAR: SLIGHT
POLYCHROMASIA BLD QL SMEAR: ABNORMAL
POTASSIUM SERPL-SCNC: 3.2 MMOL/L (ref 3.5–5.1)
POTASSIUM SERPL-SCNC: 4.7 MMOL/L (ref 3.5–5.1)
POTASSIUM SERPL-SCNC: NORMAL MMOL/L (ref 3.5–5.1)
PROT SERPL-MCNC: 4 G/DL (ref 6–8.4)
PROT SERPL-MCNC: NORMAL G/DL (ref 6–8.4)
PROTHROMBIN TIME: 18.6 SEC (ref 9–12.5)
PROTHROMBIN TIME: 21.4 SEC (ref 9–12.5)
RBC # BLD AUTO: 1.53 M/UL (ref 4–5.4)
RBC # BLD AUTO: 2.16 M/UL (ref 4–5.4)
RBC # BLD AUTO: 2.3 M/UL (ref 4–5.4)
RBC # BLD AUTO: 2.36 M/UL (ref 4–5.4)
RBC # BLD AUTO: 2.4 M/UL (ref 4–5.4)
SAMPLE: ABNORMAL
SCHISTOCYTES BLD QL SMEAR: PRESENT
SCHISTOCYTES BLD QL SMEAR: PRESENT
SITE: ABNORMAL
SODIUM SERPL-SCNC: 143 MMOL/L (ref 136–145)
SODIUM SERPL-SCNC: NORMAL MMOL/L (ref 136–145)
SPECIMEN OUTDATE: NORMAL
SPHEROCYTES BLD QL SMEAR: ABNORMAL
SPHEROCYTES BLD QL SMEAR: ABNORMAL
TOXIC GRANULES BLD QL SMEAR: PRESENT
TOXIC GRANULES BLD QL SMEAR: PRESENT
TRANS ERYTHROCYTES VOL PATIENT: NORMAL ML
TRANS ERYTHROCYTES VOL PATIENT: NORMAL ML
UNIT NUMBER: NORMAL
VT: 320
WBC # BLD AUTO: 7.53 K/UL (ref 3.9–12.7)
WBC # BLD AUTO: 8.58 K/UL (ref 3.9–12.7)
WBC # BLD AUTO: 9 K/UL (ref 3.9–12.7)
WBC # BLD AUTO: 9.07 K/UL (ref 3.9–12.7)
WBC # BLD AUTO: 9.59 K/UL (ref 3.9–12.7)

## 2023-06-03 PROCEDURE — 86920 COMPATIBILITY TEST SPIN: CPT

## 2023-06-03 PROCEDURE — 37799 UNLISTED PX VASCULAR SURGERY: CPT

## 2023-06-03 PROCEDURE — 80053 COMPREHEN METABOLIC PANEL: CPT

## 2023-06-03 PROCEDURE — P9012 CRYOPRECIPITATE EACH UNIT: HCPCS | Performed by: NURSE PRACTITIONER

## 2023-06-03 PROCEDURE — 82140 ASSAY OF AMMONIA: CPT | Performed by: NURSE PRACTITIONER

## 2023-06-03 PROCEDURE — 86920 COMPATIBILITY TEST SPIN: CPT | Performed by: PHYSICIAN ASSISTANT

## 2023-06-03 PROCEDURE — 36556 PR INSERT NON-TUNNEL CV CATH 5+ YRS OLD: ICD-10-PCS | Mod: ,,, | Performed by: PSYCHIATRY & NEUROLOGY

## 2023-06-03 PROCEDURE — 86900 BLOOD TYPING SEROLOGIC ABO: CPT

## 2023-06-03 PROCEDURE — 99222 PR INITIAL HOSPITAL CARE,LEVL II: ICD-10-PCS | Mod: GC,,, | Performed by: INTERNAL MEDICINE

## 2023-06-03 PROCEDURE — 85730 THROMBOPLASTIN TIME PARTIAL: CPT | Performed by: PSYCHIATRY & NEUROLOGY

## 2023-06-03 PROCEDURE — 84100 ASSAY OF PHOSPHORUS: CPT | Mod: 91 | Performed by: STUDENT IN AN ORGANIZED HEALTH CARE EDUCATION/TRAINING PROGRAM

## 2023-06-03 PROCEDURE — P9021 RED BLOOD CELLS UNIT: HCPCS

## 2023-06-03 PROCEDURE — 82803 BLOOD GASES ANY COMBINATION: CPT

## 2023-06-03 PROCEDURE — 83735 ASSAY OF MAGNESIUM: CPT | Mod: 91 | Performed by: STUDENT IN AN ORGANIZED HEALTH CARE EDUCATION/TRAINING PROGRAM

## 2023-06-03 PROCEDURE — 85384 FIBRINOGEN ACTIVITY: CPT | Performed by: NURSE PRACTITIONER

## 2023-06-03 PROCEDURE — P9035 PLATELET PHERES LEUKOREDUCED: HCPCS | Performed by: NURSE PRACTITIONER

## 2023-06-03 PROCEDURE — 85025 COMPLETE CBC W/AUTO DIFF WBC: CPT | Mod: 91 | Performed by: PSYCHIATRY & NEUROLOGY

## 2023-06-03 PROCEDURE — 36556 INSERT NON-TUNNEL CV CATH: CPT | Mod: ,,, | Performed by: PSYCHIATRY & NEUROLOGY

## 2023-06-03 PROCEDURE — 85610 PROTHROMBIN TIME: CPT

## 2023-06-03 PROCEDURE — 80053 COMPREHEN METABOLIC PANEL: CPT | Mod: 91 | Performed by: STUDENT IN AN ORGANIZED HEALTH CARE EDUCATION/TRAINING PROGRAM

## 2023-06-03 PROCEDURE — 99222 1ST HOSP IP/OBS MODERATE 55: CPT | Mod: GC,,, | Performed by: INTERNAL MEDICINE

## 2023-06-03 PROCEDURE — 99291 CRITICAL CARE FIRST HOUR: CPT | Mod: 25,,, | Performed by: PSYCHIATRY & NEUROLOGY

## 2023-06-03 PROCEDURE — 27000221 HC OXYGEN, UP TO 24 HOURS

## 2023-06-03 PROCEDURE — 95718 EEG PHYS/QHP 2-12 HR W/VEEG: CPT | Mod: ,,, | Performed by: PSYCHIATRY & NEUROLOGY

## 2023-06-03 PROCEDURE — 84132 ASSAY OF SERUM POTASSIUM: CPT | Performed by: PSYCHIATRY & NEUROLOGY

## 2023-06-03 PROCEDURE — 85025 COMPLETE CBC W/AUTO DIFF WBC: CPT

## 2023-06-03 PROCEDURE — 99900026 HC AIRWAY MAINTENANCE (STAT)

## 2023-06-03 PROCEDURE — 99291 PR CRITICAL CARE, E/M 30-74 MINUTES: ICD-10-PCS | Mod: 25,,, | Performed by: PSYCHIATRY & NEUROLOGY

## 2023-06-03 PROCEDURE — P9047 ALBUMIN (HUMAN), 25%, 50ML: HCPCS | Mod: JZ,JG | Performed by: PSYCHIATRY & NEUROLOGY

## 2023-06-03 PROCEDURE — 85025 COMPLETE CBC W/AUTO DIFF WBC: CPT | Mod: 91 | Performed by: NURSE PRACTITIONER

## 2023-06-03 PROCEDURE — C9113 INJ PANTOPRAZOLE SODIUM, VIA: HCPCS | Performed by: STUDENT IN AN ORGANIZED HEALTH CARE EDUCATION/TRAINING PROGRAM

## 2023-06-03 PROCEDURE — 63600175 PHARM REV CODE 636 W HCPCS: Performed by: PSYCHIATRY & NEUROLOGY

## 2023-06-03 PROCEDURE — 99900035 HC TECH TIME PER 15 MIN (STAT)

## 2023-06-03 PROCEDURE — 25000003 PHARM REV CODE 250: Performed by: INTERNAL MEDICINE

## 2023-06-03 PROCEDURE — 25000003 PHARM REV CODE 250

## 2023-06-03 PROCEDURE — 86965 POOLING BLOOD PLATELETS: CPT | Performed by: NURSE PRACTITIONER

## 2023-06-03 PROCEDURE — 85027 COMPLETE CBC AUTOMATED: CPT | Performed by: PSYCHIATRY & NEUROLOGY

## 2023-06-03 PROCEDURE — 25000003 PHARM REV CODE 250: Performed by: STUDENT IN AN ORGANIZED HEALTH CARE EDUCATION/TRAINING PROGRAM

## 2023-06-03 PROCEDURE — 27200966 HC CLOSED SUCTION SYSTEM

## 2023-06-03 PROCEDURE — 36430 TRANSFUSION BLD/BLD COMPNT: CPT

## 2023-06-03 PROCEDURE — 83735 ASSAY OF MAGNESIUM: CPT

## 2023-06-03 PROCEDURE — 94761 N-INVAS EAR/PLS OXIMETRY MLT: CPT

## 2023-06-03 PROCEDURE — 20000000 HC ICU ROOM

## 2023-06-03 PROCEDURE — 95718 PR EEG, W/VIDEO, CONT RECORD, I&R, 2-12 HRS: ICD-10-PCS | Mod: ,,, | Performed by: PSYCHIATRY & NEUROLOGY

## 2023-06-03 PROCEDURE — 85002 BLEEDING TIME TEST: CPT

## 2023-06-03 PROCEDURE — 25000003 PHARM REV CODE 250: Performed by: PSYCHIATRY & NEUROLOGY

## 2023-06-03 PROCEDURE — 84100 ASSAY OF PHOSPHORUS: CPT

## 2023-06-03 PROCEDURE — 85007 BL SMEAR W/DIFF WBC COUNT: CPT | Performed by: PSYCHIATRY & NEUROLOGY

## 2023-06-03 PROCEDURE — 82330 ASSAY OF CALCIUM: CPT | Performed by: INTERNAL MEDICINE

## 2023-06-03 PROCEDURE — 94003 VENT MGMT INPAT SUBQ DAY: CPT

## 2023-06-03 PROCEDURE — 63600175 PHARM REV CODE 636 W HCPCS: Performed by: STUDENT IN AN ORGANIZED HEALTH CARE EDUCATION/TRAINING PROGRAM

## 2023-06-03 PROCEDURE — 84100 ASSAY OF PHOSPHORUS: CPT | Mod: 91 | Performed by: PSYCHIATRY & NEUROLOGY

## 2023-06-03 PROCEDURE — 85610 PROTHROMBIN TIME: CPT | Mod: 91 | Performed by: PSYCHIATRY & NEUROLOGY

## 2023-06-03 PROCEDURE — 25000003 PHARM REV CODE 250: Performed by: NURSE PRACTITIONER

## 2023-06-03 RX ORDER — HYDROCODONE BITARTRATE AND ACETAMINOPHEN 500; 5 MG/1; MG/1
TABLET ORAL
Status: DISCONTINUED | OUTPATIENT
Start: 2023-06-03 | End: 2023-06-05

## 2023-06-03 RX ORDER — LACTULOSE 10 G/15ML
200 SOLUTION ORAL; RECTAL 3 TIMES DAILY
Status: DISCONTINUED | OUTPATIENT
Start: 2023-06-03 | End: 2023-06-10

## 2023-06-03 RX ORDER — LEVETIRACETAM 500 MG/5ML
500 INJECTION, SOLUTION, CONCENTRATE INTRAVENOUS EVERY 12 HOURS
Status: DISCONTINUED | OUTPATIENT
Start: 2023-06-03 | End: 2023-06-10

## 2023-06-03 RX ORDER — NOREPINEPHRINE BITARTRATE/D5W 8 MG/250ML
0-3 PLASTIC BAG, INJECTION (ML) INTRAVENOUS CONTINUOUS
Status: DISCONTINUED | OUTPATIENT
Start: 2023-06-03 | End: 2023-06-07

## 2023-06-03 RX ORDER — PANTOPRAZOLE SODIUM 40 MG/10ML
80 INJECTION, POWDER, LYOPHILIZED, FOR SOLUTION INTRAVENOUS ONCE
Status: COMPLETED | OUTPATIENT
Start: 2023-06-03 | End: 2023-06-03

## 2023-06-03 RX ORDER — NOREPINEPHRINE BITARTRATE/D5W 4MG/250ML
PLASTIC BAG, INJECTION (ML) INTRAVENOUS
Status: DISPENSED
Start: 2023-06-03 | End: 2023-06-03

## 2023-06-03 RX ADMIN — MUPIROCIN: 20 OINTMENT TOPICAL at 09:06

## 2023-06-03 RX ADMIN — CEFTRIAXONE 2 G: 2 INJECTION, POWDER, FOR SOLUTION INTRAMUSCULAR; INTRAVENOUS at 02:06

## 2023-06-03 RX ADMIN — SENNOSIDES AND DOCUSATE SODIUM 1 TABLET: 50; 8.6 TABLET ORAL at 08:06

## 2023-06-03 RX ADMIN — LACTULOSE 200 G: 10 SOLUTION ORAL at 09:06

## 2023-06-03 RX ADMIN — ARIPIPRAZOLE 5 MG: 5 TABLET ORAL at 08:06

## 2023-06-03 RX ADMIN — VASOPRESSIN 0.04 UNITS/MIN: 20 INJECTION INTRAVENOUS at 03:06

## 2023-06-03 RX ADMIN — POTASSIUM BICARBONATE 35 MEQ: 391 TABLET, EFFERVESCENT ORAL at 04:06

## 2023-06-03 RX ADMIN — NOREPINEPHRINE BITARTRATE 0.26 MCG/KG/MIN: 8 INJECTION, SOLUTION INTRAVENOUS at 08:06

## 2023-06-03 RX ADMIN — Medication 100 MG: at 08:06

## 2023-06-03 RX ADMIN — SODIUM BICARBONATE 1300 MG: 650 TABLET ORAL at 09:06

## 2023-06-03 RX ADMIN — NOREPINEPHRINE BITARTRATE 0.38 MCG/KG/MIN: 8 INJECTION, SOLUTION INTRAVENOUS at 11:06

## 2023-06-03 RX ADMIN — LACTULOSE 200 G: 10 SOLUTION ORAL at 04:06

## 2023-06-03 RX ADMIN — POTASSIUM & SODIUM PHOSPHATES POWDER PACK 280-160-250 MG 2 PACKET: 280-160-250 PACK at 08:06

## 2023-06-03 RX ADMIN — VASOPRESSIN 0.04 UNITS/MIN: 20 INJECTION INTRAVENOUS at 10:06

## 2023-06-03 RX ADMIN — MUPIROCIN: 20 OINTMENT TOPICAL at 08:06

## 2023-06-03 RX ADMIN — LACTULOSE 30 G: 20 SOLUTION ORAL at 08:06

## 2023-06-03 RX ADMIN — PANTOPRAZOLE SODIUM 80 MG: 40 INJECTION, POWDER, FOR SOLUTION INTRAVENOUS at 04:06

## 2023-06-03 RX ADMIN — FENTANYL CITRATE 50 MCG: 0.05 INJECTION, SOLUTION INTRAMUSCULAR; INTRAVENOUS at 08:06

## 2023-06-03 RX ADMIN — SODIUM BICARBONATE 1300 MG: 650 TABLET ORAL at 08:06

## 2023-06-03 RX ADMIN — LEVETIRACETAM 1000 MG: 100 INJECTION, SOLUTION INTRAVENOUS at 08:06

## 2023-06-03 RX ADMIN — POTASSIUM & SODIUM PHOSPHATES POWDER PACK 280-160-250 MG 2 PACKET: 280-160-250 PACK at 01:06

## 2023-06-03 RX ADMIN — POTASSIUM BICARBONATE 35 MEQ: 391 TABLET, EFFERVESCENT ORAL at 08:06

## 2023-06-03 RX ADMIN — POTASSIUM & SODIUM PHOSPHATES POWDER PACK 280-160-250 MG 2 PACKET: 280-160-250 PACK at 04:06

## 2023-06-03 RX ADMIN — FENTANYL CITRATE 50 MCG: 0.05 INJECTION, SOLUTION INTRAMUSCULAR; INTRAVENOUS at 03:06

## 2023-06-03 RX ADMIN — NOREPINEPHRINE BITARTRATE 0.02 MCG/KG/MIN: 8 INJECTION, SOLUTION INTRAVENOUS at 04:06

## 2023-06-03 RX ADMIN — FAMOTIDINE 20 MG: 20 TABLET, FILM COATED ORAL at 08:06

## 2023-06-03 RX ADMIN — FOLIC ACID 1 MG: 1 TABLET ORAL at 08:06

## 2023-06-03 RX ADMIN — ALBUMIN HUMAN 25 G: 0.25 SOLUTION INTRAVENOUS at 06:06

## 2023-06-03 RX ADMIN — LEVETIRACETAM 500 MG: 100 INJECTION, SOLUTION INTRAVENOUS at 09:06

## 2023-06-03 RX ADMIN — RIFAXIMIN 550 MG: 550 TABLET ORAL at 08:06

## 2023-06-03 RX ADMIN — PANTOPRAZOLE SODIUM 40 MG: 40 INJECTION, POWDER, FOR SOLUTION INTRAVENOUS at 08:06

## 2023-06-03 RX ADMIN — RIFAXIMIN 550 MG: 550 TABLET ORAL at 09:06

## 2023-06-03 RX ADMIN — NOREPINEPHRINE BITARTRATE 0.38 MCG/KG/MIN: 8 INJECTION, SOLUTION INTRAVENOUS at 02:06

## 2023-06-03 NOTE — NURSING
[facility] Care Plan    POC reviewed with Marely Hamilton and family at 1400. Pt's sister verbalized understanding. Questions and concerns addressed. Will continue to monitor. See below and flowsheets for full assessment and VS info.     - 1 unit RBC, 1 unit platelets, 1 unit cryo administered  - central line placed  - DC EEG  - flexi placed  - lactulose enema  - replaced K and Phos  - levo and vaso gtts infusing  - Darshan Trac set up  - WC consulted          Is this a stroke patient? no    Neuro:  Cheryl Coma Scale  Best Eye Response: 1-->(E1) none  Best Motor Response: 3-->(M3) flexion to pain  Best Verbal Response: 1-->(V1) none  Cheryl Coma Scale Score: 5  Assessment Qualifiers: patient intubated  Pupil PERRLA: no     24 hr Temp:  [95.5 °F (35.3 °C)-99.7 °F (37.6 °C)]     CV:   Rhythm: normal sinus rhythm  BP goals:   SBP < 160  MAP > 65    Resp:      Vent Mode: A/C  Set Rate: 14 BPM  Oxygen Concentration (%): 60  Vt Set: 320 mL  PEEP/CPAP: 5 cmH20    Plan: wean to extubate    GI/:     Diet/Nutrition Received: NPO  Last Bowel Movement: 06/03/23  Voiding Characteristics: urethral catheter (bladder)    Intake/Output Summary (Last 24 hours) at 6/3/2023 1824  Last data filed at 6/3/2023 1805  Gross per 24 hour   Intake 4946.72 ml   Output 2235 ml   Net 2711.72 ml     Unmeasured Output  Urine Occurrence: 0  Stool Occurrence: 1  Emesis Occurrence: 0  Pad Count: 2    Labs/Accuchecks:  Recent Labs   Lab 06/03/23  1740   WBC 9.07   RBC 2.30*   HGB 7.4*   HCT 23.3*   PLT 32*      Recent Labs   Lab 06/03/23  0256 06/03/23  1145     --    K 3.2* 4.7   CO2 19*  --    *  --    BUN 21*  --    CREATININE 0.8  --    ALKPHOS 150*  --    ALT 54*  --    AST 69*  --    BILITOT 3.5*  --       Recent Labs   Lab 06/03/23  1444   INR 1.8*   APTT 43.0*    No results for input(s): CPK, CPKMB, TROPONINI, MB in the last 168 hours.    Electrolytes: Electrolytes replaced  Accuchecks: ACHS    Gtts:   NORepinephrine bitartrate-D5W 0.26  mcg/kg/min (06/03/23 1805)    vasopressin 0.04 Units/min (06/03/23 1805)       LDA/Wounds:  Lines/Drains/Airways       Central Venous Catheter Line  Duration             Percutaneous Central Line Insertion/Assessment - Triple Lumen  06/03/23 1345 Femoral Right <1 day              Drain  Duration                  NG/OG Tube 06/01/23 2200 Right nostril 1 day         Urethral Catheter 06/01/23 2101 Temperature probe 1 day         Rectal Tube 06/03/23 1300 <1 day              Airway  Duration                  Airway - Non-Surgical 06/02/23 1118 Endotracheal Tube 1 day              Arterial Line  Duration             Arterial Line 06/02/23 1050 Left Radial 1 day              Peripheral Intravenous Line  Duration                  Midline Catheter Insertion/Assessment  - Single Lumen 05/25/23 1005 Right basilic vein (medial side of arm) other (see comments) 9 days         Peripheral IV - Single Lumen 06/02/23 0030 20 G Anterior;Left Foot 1 day         Peripheral IV - Single Lumen 06/02/23 1130 18 G;1 3/4 in Left Upper Arm 1 day         Peripheral IV - Single Lumen 06/02/23 2200 20 G Anterior;Right Ankle <1 day                  Wounds: Yes  Wound care consulted: Yes

## 2023-06-03 NOTE — ASSESSMENT & PLAN NOTE
Continue Lactulose and Rifaximin   concern that this is driving the hypotension and other complications  Add vasopressin, albumin reasonable for now, consider stress steroids if hgb stable  Follow coags and fibrinogen q12 and cbc q6  Start SBP prophylaxis with ceftriaxone

## 2023-06-03 NOTE — NURSING
Shortly after 4mg Levo transitioned to 8mg levo over 1 hour process, pt became bradycardic in the 40s and BP dropped with MAPs in the 30s. Code cart brought to bedside. KIARRA Cooley and DEYANIRA Worley at bedside. Levo increased from 0.18 mg/kg to 0.4 mg/kg. HR and BP stabilized. Blood transfusion rate increased from 75ml/hr to 125ml/hr per NP. Pt remains stable with Levo at 0.3. WCTM closely.

## 2023-06-03 NOTE — TREATMENT PLAN
Hepatology Treatment Plan    Marely Hamilton is a 58 y.o. female admitted to hospital 5/28/2023 (Hospital Day: 7) due to Non-convulsive status epilepticus.     Interval History  Patient intubated yesterday for airway protection with concern for on-going seizure activity, however follow-up EEG without underlying seizures and more consistent with encephalopathy. Overnight, patient noted to develop bradycardia and worsening hypotension after Levophed dose was increased. No bowel movements reported overnight. On bedside encounter, patient would open eyes to speech, however did not follow commands. Labs notable for normal WBC, progressive anemia (Hgb 5.3 from 7.9 on 06/02), worsening thrombocytopenia (32), worsening INR (2.1 from 1.8), and stable hyperbilirubinemia (3.5 from 3.8).     Objective  Temp:  [95.5 °F (35.3 °C)-98.6 °F (37 °C)] 98.6 °F (37 °C) (06/03 0905)  Pulse:  [] 102 (06/03 0905)  BP: ()/(46-82) 143/62 (06/03 0905)  Resp:  [14-33] 31 (06/03 0905)  SpO2:  [93 %-100 %] 93 % (06/03 0905)  Arterial Line BP: ()/(44-78) 132/66 (06/03 0905)    General: Intubated  female; no acute distress.   HEENT: OG in place containing clear brown output.   Abdomen: Non-distended. Soft.   MSK: No edema.     Laboratory    Lab Results   Component Value Date    WBC 9.59 06/03/2023    HGB 5.3 (LL) 06/03/2023    HCT 18.4 (LL) 06/03/2023     (H) 06/03/2023    PLT 32 (LL) 06/03/2023       Lab Results   Component Value Date     06/03/2023    K 3.2 (L) 06/03/2023     (H) 06/03/2023    CO2 19 (L) 06/03/2023    BUN 21 (H) 06/03/2023    CREATININE 0.8 06/03/2023    CALCIUM 6.9 (LL) 06/03/2023       Lab Results   Component Value Date    ALBUMIN 2.4 (L) 06/03/2023    ALT 54 (H) 06/03/2023    AST 69 (H) 06/03/2023     (H) 06/02/2020    ALKPHOS 150 (H) 06/03/2023    BILITOT 3.5 (H) 06/03/2023       Lab Results   Component Value Date    INR 2.1 (H) 06/03/2023    INR 1.8 (H) 06/02/2023    INR  1.7 (H) 06/01/2023       MELD-Na: 19 at 6/3/2023  2:56 AM  MELD: 19 at 6/3/2023  2:56 AM  Calculated from:  Serum Creatinine: 0.8 mg/dL (Using min of 1 mg/dL) at 6/3/2023  2:56 AM  Serum Sodium: 143 mmol/L (Using max of 137 mmol/L) at 6/3/2023  2:56 AM  Total Bilirubin: 3.5 mg/dL at 6/3/2023  2:56 AM  INR(ratio): 2.1 at 6/3/2023  2:47 AM      Assessment  This is a 58 year old female with PMH significant for ETOH cirrhosis (associated with ascites and encephalopathy; declined for transplant here in 12/2015 due to malnutrition), bipolar disorder (on Lithium), and unspecified seizure disorder (on AEDs) who was admitted to [facility] on 05/28 as a transfer from [facility] for management of recurrent hepatic encephalopathy following initial admission on 05/18 for acute onset of encephalopathy associated with UTI secondary to Proteus, hypernatremia, and hypercalcemia. She is status post treatment for UTI and electrolyte derangements without improvement in encephalopathy; CT and MRI brain unremarkable and initial EEG at [facility] negative for seizures. She is status post evaluation by psychiatry here with low suspicion for psychiatric disorder contributing to encephalopathy. On chart review, patient noted to have recurrent monthly admissions since 01/2023 for hepatic encephalopathy suspected from non-compliance with Lactulose, however her continued encephalopathy since admission seemed atypical for purely hepatic component given re-initiation of Lactulose. Repeat EEG obtained on 05/31 concerning for frequent focal seizure activity approaching status epilepticus; AED's increased and patient transferred to neuro.ICU on 06/01 for closer monitoring with eventual need for intubation for airway protection on 06/02. EEG as of 06/02 without recurrent seizures and more consistent with severe encephalopathy. Patient now with progressive anemia and coagulopathy, but without overt signs of bleeding.      Recommendations:      -Repeat  blood cultures given increasing vasopressor requirements and recommend adding at least Ceftriaxone for SBP coverage.   -Follow-up 24 hour urine copper to rule out Ronnie's disease.   -Monitor for overt signs of bleeding.   -Thiamine supplement daily given low Niacin levels.   -Lactulose TID and Rifaximin BID; titrate Lactulose to 3-4 bowel movements daily. If no effect with oral, then use Lactulose enemas.   -Okay to hold home diuretics without hypervolemia noted on exam.   -Minimize use of medications that may precipitate encephalopathy (e.g. opioids and benzos).   -CMP and INR daily.   -Despite MELD score of 20, no plans for re-initiation of transplant evaluation given encephalopathy limiting ability to confirm social history with patient and ETOH cessation. Follow-up repeat PETH.     Thank you for involving us in the care of Marely Hamilton. Please call with any additional concerns or questions.        Sarbjit Hollins MD, PGY-V  Gastroenterology Fellow  [facility]

## 2023-06-03 NOTE — PROCEDURES
"Marely Hamilton is a 58 y.o. female patient.    Temp: 99 °F (37.2 °C) (06/03/23 1305)  Pulse: 109 (06/03/23 1305)  Resp: (!) 29 (06/03/23 1305)  BP: (!) 146/72 (06/03/23 1305)  SpO2: 96 % (06/03/23 1305)  Weight: 59.9 kg (132 lb) (05/29/23 1221)  Height: 5' 2" (157.5 cm) (05/29/23 1221)       Central Line    Date/Time: 6/3/2023 2:37 PM  Performed by: Rafi Baer MD  Authorized by: Rafi Baer MD     Location procedure was performed:  [facility]UR[facility]  Consent Done ?:  Yes  Time out complete?: Verified correct patient, procedure, equipment, staff, and site/side    Indications:  Med administration and vascular access  Anesthesia:  Local infiltration  Local anesthetic:  Lidocaine 1% without epinephrine  Anesthetic total (ml):  4  Preparation:  Skin prepped with ChloraPrep  Skin prep agent dried: Skin prep agent completely dried prior to procedure    Sterile barriers: All five maximal sterile barriers used - gloves, gown, cap, mask and large sterile sheet    Hand hygiene: Hand hygiene performed immediately prior to central venous catheter insertion    Location:  Right femoral  Site selection rationale:  Severe coagulopathy  Catheter type:  Triple lumen  Catheter size:  7.5 Fr  Inserted Catheter Length (cm):  15  Ultrasound guidance: Yes    Vessel Caliber:  Large   patent  Comprressibility:  Normal  Needle advanced into vessel with real time ultrasound guidance.    Steril sheath on probe.    Sterile gel used.  Manometry: Yes    Number of attempts:  1  Securement:  Line sutured, chlorhexidine patch, sterile dressing applied and blood return through all ports  Complications: No      6/3/2023    "

## 2023-06-03 NOTE — PLAN OF CARE
[facility] Care Plan    POC reviewed with Marely Hamilton and family at 0300. No acute events overnight. Pt not progressing toward goals. Will continue to monitor. See below and flowsheets for full assessment and VS info.     Switched to Levo 8mg/250ml  1 unit pRBCs given  Fentanyl x2 for tachypnea   No clinical seizure activity noted overnight  Bearhugger to keep normothermic  CHG bath completed  K+ and phos replaced  Wound care completed      Is this a stroke patient? no    Neuro:  Cheryl Coma Scale  Best Eye Response: 1-->(E1) none  Best Motor Response: 3-->(M3) flexion to pain  Best Verbal Response: 1-->(V1) none  Cheryl Coma Scale Score: 5  Assessment Qualifiers: patient chemically sedated or paralyzed, patient intubated  Pupil PERRLA: no     24hr Temp:  [90.5 °F (32.5 °C)-98.4 °F (36.9 °C)]     CV:   Rhythm: atrial rhythm  BP goals:   SBP < 180  MAP > 65    Resp:      Vent Mode: A/C  Set Rate: 14 BPM  Oxygen Concentration (%): 60  Vt Set: 320 mL  PEEP/CPAP: 5 cmH20    Plan: N/A    GI/:     Diet/Nutrition Received: NPO  Last Bowel Movement: 05/31/23  Voiding Characteristics: urethral catheter (bladder)    Intake/Output Summary (Last 24 hours) at 6/3/2023 0530  Last data filed at 6/3/2023 0505  Gross per 24 hour   Intake 6729.91 ml   Output 885 ml   Net 5844.91 ml     Unmeasured Output  Urine Occurrence: 0  Stool Occurrence: 0  Emesis Occurrence: 0  Pad Count: 0    Labs/Accuchecks:  Recent Labs   Lab 06/03/23  0247   WBC 9.59   RBC 1.53*   HGB 5.3*   HCT 18.4*   PLT 32*      Recent Labs   Lab 06/03/23  0256      K 3.2*   CO2 19*   *   BUN 21*   CREATININE 0.8   ALKPHOS 150*   ALT 54*   AST 69*   BILITOT 3.5*      Recent Labs   Lab 05/27/23  1204 05/28/23  1330 06/03/23  0247   INR 2.0*   < > 2.1*   APTT 43.3*  --   --     < > = values in this interval not displayed.    No results for input(s): CPK, CPKMB, TROPONINI, MB in the last 168 hours.    Electrolytes: Electrolytes replaced  Accuchecks:  Q6H    Gtts:   sodium chloride 0.45% 50 mL/hr at 06/03/23 0505    NORepinephrine bitartrate-D5W 0.06 mcg/kg/min (06/03/23 0505)       LDA/Wounds:  Lines/Drains/Airways       Drain  Duration                  NG/OG Tube 06/01/23 2200 Right nostril 1 day         Urethral Catheter 06/01/23 2101 Temperature probe 1 day              Airway  Duration                  Airway - Non-Surgical 06/02/23 1118 Endotracheal Tube <1 day       Airway Anesthesia 06/02/23 <1 day              Arterial Line  Duration             Arterial Line 06/02/23 1050 Left Radial <1 day              Peripheral Intravenous Line  Duration                  Midline Catheter Insertion/Assessment  - Single Lumen 05/25/23 1005 Right basilic vein (medial side of arm) other (see comments) 8 days         Peripheral IV - Single Lumen 06/02/23 0030 20 G Anterior;Left Foot 1 day         Peripheral IV - Single Lumen 06/02/23 1130 18 G;1 3/4 in Left Upper Arm <1 day         Peripheral IV - Single Lumen 06/02/23 2200 20 G Anterior;Right Ankle <1 day                  Wounds: Yes  Wound care consulted: Yes

## 2023-06-03 NOTE — NURSING
Manometry for Central Line Procedure     Manometry Performed: yes    Manometry performed by: MD Keyur

## 2023-06-03 NOTE — SUBJECTIVE & OBJECTIVE
Interval History:      Review of Systems   Unable to perform ROS: Intubated     Objective:     Vitals:  Temp: 99 °F (37.2 °C)  Pulse: 109  Rhythm: atrial rhythm  BP: (!) 146/72  MAP (mmHg): 102  Resp: (!) 29  SpO2: 96 %  Oxygen Concentration (%): 60  Vent Mode: A/C  Set Rate: 14 BPM  Vt Set: 320 mL  PEEP/CPAP: 5 cmH20  Peak Airway Pressure: 9.6 cmH20  Mean Airway Pressure: 6.5 cmH20  Plateau Pressure: 0 cmH20    Temp  Min: 95.5 °F (35.3 °C)  Max: 99.7 °F (37.6 °C)  Pulse  Min: 37  Max: 122  BP  Min: 93/57  Max: 146/72  MAP (mmHg)  Min: 69  Max: 102  Resp  Min: 17  Max: 33  SpO2  Min: 93 %  Max: 100 %  Oxygen Concentration (%)  Min: 60  Max: 60    06/02 0701 - 06/03 0700  In: 6813.6 [I.V.:6215.3]  Out: 900 [Urine:900]   Unmeasured Output  Urine Occurrence: 0  Stool Occurrence: 1  Emesis Occurrence: 0  Pad Count: 2        Physical Exam  Constitutional:       Comments: obtunded   HENT:      Head: Normocephalic.   Eyes:      Pupils: Pupils are equal, round, and reactive to light.   Cardiovascular:      Rate and Rhythm: Normal rate.   Pulmonary:      Breath sounds: Rales present.   Abdominal:      General: There is distension.      Comments: Firm but compressable   Musculoskeletal:      Cervical back: Neck supple.   Skin:     General: Skin is warm.      Capillary Refill: Capillary refill takes less than 2 seconds.   Neurological:      Comments: Left sided hemiparesis  Grimaces to noxious            Medications:  ContinuousNORepinephrine bitartrate-D5W, Last Rate: 0.38 mcg/kg/min (06/03/23 1412)  vasopressin    ScheduledARIPiprazole, 5 mg, Daily  cefTRIAXone (ROCEPHIN) IVPB, 2 g, Q24H  folic acid, 1 mg, Daily  lactulose, 200 g, TID  levETIRAcetam (Keppra) IV (PEDS and ADULTS), 500 mg, Q12H  mupirocin, , BID  pantoprozole (PROTONIX) IV, 40 mg, Q12H  pantoprozole (PROTONIX) IV, 80 mg, Once  rifAXIMin, 550 mg, BID  sodium bicarbonate, 1,300 mg, BID  thiamine, 100 mg, Daily    PRNsodium chloride, , Q24H PRN  sodium chloride,  , Q24H PRN  sodium chloride, , Q24H PRN  sodium chloride, , Q24H PRN  acetaminophen, 650 mg, Q8H PRN  aluminum-magnesium hydroxide-simethicone, 30 mL, QID PRN  dextrose 10%, 12.5 g, PRN  dextrose 10%, 25 g, PRN  dextrose, 15 g, PRN  dextrose, 30 g, PRN  fentaNYL, 50 mcg, Q1H PRN  glucagon (human recombinant), 1 mg, PRN  insulin aspart U-100, 0-5 Units, QID (AC + HS) PRN  magnesium oxide, 800 mg, PRN  magnesium oxide, 800 mg, PRN  melatonin, 6 mg, Nightly PRN  naloxone, 0.02 mg, PRN  ondansetron, 4 mg, Q8H PRN  potassium bicarbonate, 35 mEq, PRN  potassium bicarbonate, 50 mEq, PRN  potassium bicarbonate, 60 mEq, PRN  potassium, sodium phosphates, 2 packet, PRN  potassium, sodium phosphates, 2 packet, PRN  potassium, sodium phosphates, 2 packet, PRN  simethicone, 1 tablet, QID PRN  sodium chloride 0.9%, 10 mL, Q8H PRN      Today I personally reviewed pertinent medications, lines/drains/airways, imaging, laboratory results, notably:    Diet  Diet NPO  Diet NPO

## 2023-06-03 NOTE — CONSULTS
[facility]  Gastroenterology  Consult Note    Patient Name: Marely Hamilton  MRN: [id number]  Admission Date: 5/28/2023  Hospital Length of Stay: 6 days  Code Status: Full Code   Attending Provider: Rafi Baer MD   Consulting Provider: Roberto Faith MD  Primary Care Physician: Viktor Ross MD  Principal Problem:Non-convulsive status epilepticus    Inpatient consult to Gastroenterology  Consult performed by: Roberto Faith MD  Consult ordered by: Caro Bey NP      Subjective:     HPI: Marely Hamilton is a 58 y.o. female with history of alcoholic cirrhosis, seizure disorder who was transferred with concern for non-convulsive status epilepticus vs HE. Admitted at [facility] on 5/18 for AMS, thought from UTI, lithium toxicity, and HE. Later developed seizure activity on EEG prompting transfer. Intubated for hemodynamic instability and airway protection on 6/2. Noted to have traumatic intubation. GI consulted for drop in Hgb from 7.9 to 5.3 overnight. Responded to 7.7 after 2u. Oropharyngeal bleeding noted when suctioning today. No bloody output from NG suction. Brown stool after enema today.        Objective:     Vitals:    06/03/23 1530   BP: 130/69   Pulse: 97   Resp: 17   Temp: 98.4 °F (36.9 °C)         Constitutional:  intubated, sedated  HENT: Head: Normal, normocephalic, atraumatic.  Eyes: conjunctiva clear and sclera nonicteric  Cardiovascular: regular rate and rhythm and no murmur  Respiratory: mechanical breath sounds  GI: soft, non-tender, without masses or organomegaly  Musculoskeletal: no muscular tenderness noted  Skin: normal color  Neurological: sedated      Significant Labs:  Reviewed the following pertinent laboratory tests: Hgb 5.3  BUN 21 Cr 0.8.     Significant Imaging:  No pertinent imaging.      Assessment/Plan:     Marely Hamilton is a 58 y.o. female with history of alcoholic cirrhosis, seizure disorder here with concern for status. GI consulted for drop in Hgb  without overt GI bleeding. Noted some oropharyngeal bleeding after intubation, consider ENT consult if ongoing. Starting IV PPI BID, monitor for overt GI bleeding with no plans for endoscopy at this time.    Problem List:  Anemia  Decompensated cirrhosis    Recommendations:  - No plans for endoscopy at this time unless overt GI bleeding develops  - Trend Hgb q8 hrs. Transfuse for Hgb <7, unless otherwise indicated  - Consider ENT consult if ongoing oropharyngeal bleeding  - Diet per primary team  - Hold all NSAIDs and anticoagulants, unless contraindicated  - Bolus IV pantoprazole 80mg followed by 40mg BID (ordered)  - Please correct any coagulopathy with platelets and FFP for goal of platelets >50K and INR <2.0  - Please notify GI team if there is significant change in patient's clinical status      Thank you for involving us in the care of Marely Hamilton. Please call with any additional questions, concerns or changes in the patient's clinical status. We will continue to follow.     Roberto Faith MD  Gastroenterology Fellow PGY IV  [facility]

## 2023-06-03 NOTE — CONSULTS
[facility] - Neuro Critical Care  Wound Care    Patient Name:  Marely Hamilton   MRN:  [id number]  Date: 6/2/2023  Diagnosis: Non-convulsive status epilepticus    History:     Past Medical History:   Diagnosis Date    Addiction to drug     Alcohol abuse     Alcohol abuse, in remission 6/15/2015    14.5 weeks ago; AA weekly    Anemia     Anxiety 6/15/2015    Behavioral problem     Bipolar disorder     Bipolar disorder in remission 6/15/2015    Cirrhosis, Laennec's 6/15/2015    Depression     Encounter for blood transfusion     Epistaxis 6/15/2015    Fatigue     Glaucoma     Hematuria     Hepatic encephalopathy 6/15/2015    Hepatic enlargement     History of psychiatric care     History of psychiatric hospitalization     History of seizure 6/15/2015    1    hx of intentional Tylenol overdose 6/15/2015    2005- situational and hx of bipolar    Hx of psychiatric care     Macrocytic anemia 9/18/2015    6 units PRBC since June 2015    Jeana     Osteoarthritis     Other ascites 6/15/2015    Psychiatric exam requested by authority     Psychiatric problem     Psychosis 9/26/2019    Renal disorder     Seizures     Self-harming behavior     Suicide attempt     Therapy     Thrombocytopenia 6/15/2015       Social History     Socioeconomic History    Marital status: Single   Occupational History    Occupation: Disabled     Employer: DISABLED   Tobacco Use    Smoking status: Never    Smokeless tobacco: Never   Substance and Sexual Activity    Alcohol use: Not Currently     Comment: hx of ETOH abuse with cirrhosis of liver    Drug use: No    Sexual activity: Not Currently   Other Topics Concern    Patient feels they ought to cut down on drinking/drug use No    Patient annoyed by others criticizing their drinking/drug use No    Patient has felt bad or guilty about drinking/drug use No    Patient has had a drink/used drugs as an eye opener in the AM No   Social History Narrative    Pt has 1 older and 1 younger sister.  Her father  committed suicide when she was 18.  She has a [employer] degree and worked as an , but she has been disabled since age 40.  She never  and has no children.  She is not dating and claims no current friends.  She currently lives with her mother along with her pet dog.  She denies any hobbies and says she is not Jain.     Social Determinants of Health     Financial Resource Strain: Unknown    Difficulty of Paying Living Expenses: Patient refused   Food Insecurity: Unknown    Worried About Running Out of Food in the Last Year: Patient refused    Ran Out of Food in the Last Year: Patient refused   Transportation Needs: Unknown    Lack of Transportation (Medical): Patient refused    Lack of Transportation (Non-Medical): Patient refused   Physical Activity: Unknown    Days of Exercise per Week: Patient refused    Minutes of Exercise per Session: Patient refused   Stress: Unknown    Feeling of Stress : Patient refused   Social Connections: Unknown    Frequency of Communication with Friends and Family: Patient refused    Frequency of Social Gatherings with Friends and Family: Patient refused    Attends Mosque Services: Patient refused    Active Member of Clubs or Organizations: Patient refused    Attends Club or Organization Meetings: Patient refused    Marital Status: Patient refused   Housing Stability: Unknown    Unable to Pay for Housing in the Last Year: Patient refused    Number of Places Lived in the Last Year: 1    Unstable Housing in the Last Year: Patient refused       Precautions:     Allergies as of 05/27/2023 - Reviewed 05/21/2023   Allergen Reaction Noted    Sulfa (sulfonamide antibiotics) Rash 11/26/2014    Codeine Nausea And Vomiting 04/26/2018       [facility] Assessment Details/Treatment     Wound Care Consult for sacrum, perineum, and left arm.   Patient unstable for turning at present time per staff.  Assessment of bilateral arms performed. Noted skin tear to right lower forearm with weeping of  serous fluid s/t edema and third spacing. [place] wet wound bed.  Weeping at left arm peripheral IV and arterial line sites.    Assisted staff nurse with dressing changes and apply mepilex transfer under IV site and surrounding tissue. Applied straw-like piece of drawtex to by iv site and placed in direction of outer iv dressing. Apply foam below end of drawtex to collect drainage.    Will follow up with patient on Monday for evaluation of sacrum.    PHOTODOCUMENTATION:       06/02/23 2001   WOCN Assessment   WOCN Total Time (mins) 100   Visit Date 06/02/23   Visit Time 2001   Consult Type New   WOCN Speciality Wound   Intervention assessed;chart review;coordination of care;orders   Teaching on-going;complication   Skin Interventions   Pressure Reduction Devices other (see comments)  (ordered immerse)   Pressure Injury Prevention    Check Moisture Management Pad Done   Check Medical Devices Done        Altered Skin Integrity 06/02/23 2130 Right lower;dorsal Arm Skin Tear   Date First Assessed/Time First Assessed: 06/02/23 2130   Altered Skin Integrity Present on Admission - Did Patient arrive to the hospital with altered skin?: yes  Side: Right  Orientation: lower;dorsal  Location: Arm  Is this injury device related?: N...   Dressing Appearance Moist drainage   Drainage Amount Moderate   Drainage Characteristics/Odor Serous   Appearance [place];Wet   Periwound Area Swelling;Edematous;Redness   Wound Length (cm) 1 cm   Wound Width (cm) 1 cm   Wound Depth (cm) 0.1 cm   Wound Volume (cm^3) 0.1 cm^3   Wound Surface Area (cm^2) 1 cm^2   Dressing Applied;Other (comment);Methylene blue/gentian violet;Tubular bandage  (mepilex transfer)   Dressing Change Due 06/05/23        RECOMMENDATIONS:  Right arm skin tear- Apply mepilex transfer, absorbant pad, stockinette. Change mepilex transfer Q 5-7 days. Change absorbant pad as needed for drainage.    Will evaluate sacrum when patient is stable. Attempt on Monday.  Staff rn reports  redness under mepilex site. No open skin present.  06/02/2023

## 2023-06-03 NOTE — PROCEDURES
24 hr. Video EEG Monitoring    Date/Time: 5/28/2023 10:38 AM  Performed by: Last Chou MD  Authorized by: Derick Winston MD       EXTENDED  ELECTROENCEPHALOGRAM  REPORT    DATE OF SERVICE: 6/2/23-6/3/23  EEG NUMBER: [id number]  REQUESTED BY:  José Antonio  [place]  [facility] [place]Y   Electroencephalographic (EEG) recording is with electrodes placed according to the International 10-20 placement system.  Thirty two (32) channels of digital signal (sampling rate of 512/sec) including T1 and T2 was simultaneously recorded from the scalp and may include  EKG, EMG, and/or eye monitors.  Recording band pass was 0.1 to 512 hz.  Digital video recording of the patient is simultaneously recorded with the EEG.  The patient is instructed report clinical symptoms which may occur during the recording session.  EEG and video recording is stored and archived in digital format.  Activation procedures which include photic stimulation, hyperventilation and instructing patients to perform simple task are done in selected patients.   The EEG is displayed on a monitor screen and can be reviewed using different montages.  Computer assisted analysis is employed to detect spike and electrographic seizure activity.   The entire record is submitted for computer analysis.  The entire recording is visually reviewed and the times identified by computer analysis as being spikes or seizures are reviewed again.  Compresses spectral analysis (CSA) is also performed on the activity recorded from each individual channel.  This is displayed as a power display of frequencies from 0 to 30 Hz over time.   The CSA is reviewed looking for asymmetries in power between homologous areas of the scalp and then compared with the original EEG recording.     [employer] software was also utilized in the review of this study.  This software suite analyzes the EEG recording in multiple domains.  Coherence and rhythmicity is computed to identify EEG  sections which may contain organized seizures.  Each channel undergoes analysis to detect presence of spike and sharp waves which have special and morphological characteristic of epileptic activity.  The routine EEG recording is converted from spacial into frequency domain.  This is then displayed comparing homologous areas to identify areas of significant asymmetry.  Algorithm to identify non-cortically generated artifact is used to separate eye movement, EMG and other artifact from the EEG.      RECORDING TIMES  Start on 6/2/23 at 07:01:17  Stop on 6/3/23 at 07:00:04      A total of 23 hrs of EEG recording was obtained.    EEG FINDINGS  The record shows a fair  organization at rest, consisting of a 6-7 Hz posterior dominant rhythm with fair  reactivity. There is mild bilateral beta activity. There is continuous diffuse thetea and delta range background slowing. There are rare brief runs of sharply contoured anterior predominant periodic theta/delta slowing with some anterior to posterior lag with a triphasic morphology    Drowsiness is characterized by attenuation of the background, vertex waves, and bilateral theta slowing. Sleep is characterized by vertex waves and poorly formed sleep spindles.    EKG recording shows a regular rhythm.    There are four push button patient events at 07:04, 08:26, 08:39, and 18:38 for head and arm twitching with no electrographic correlate.    IMPRESSION:  Abnormal study due to moderate  diffuse background slowing consistent with diffuse cerebral dysfunction and encephalopathy which may be on the basis of toxic, metabolic, or primary neuronal disorder. Four patient events are captured with no electrographic correlate.    Last Chou MD

## 2023-06-03 NOTE — PROGRESS NOTES
[facility] - Neuro Critical Care  Neurocritical Care  Progress Note    Admit Date: 5/28/2023  Service Date: 06/03/2023  Length of Stay: 6    Subjective:     Chief Complaint: Non-convulsive status epilepticus    History of Present Illness: Marely Hamilton is a 58 year old female with a medical history significant for EtOH induced hepatic cirrhosis, seizure disorder on outpatient LEV and ZNS and bipolar disorder who was admitted to [facility] on 5/28 as a transfer from OSH for evaluation of persistent encephalopathy concerning for NCSE vs hepatic encephalopathy. History is obtained from chart as patient is unresponsive and unable to assist. Initially presented to [facility] on 5/18 for encephalopathy and thought to be multifactorial including UTI (Proteus mirabilis s/p CTX course), hypercalcemia 2/2 lithium toxicity (Lithium changed to Abilify per Psych), as well as hepatic encephalopathy secondary to medication non compliance. She was treated with lactulose and rifaximin with no improvement in her mental status. EEG showed generalized slowing as well as triphasic discharges in the left hemisphere. MRI Brain WO was unremarkable for acute neurologic change. Decision was made to transfer patient to [facility] for higher level of care and ongoing evaluation and management. On arrival, mental status exam has waxed and waned with patient being intermittently verbal and following commands. NH4 48.    NCC is consulted on hospital day 4 for admission after EEG showed frequent left hemispheric electrographic seizures lasting on average 10-30 seconds with prolonged seizures over 1.5 hours also noted. Per chart review, patient home dose of LEV increased from 1000mg to 1500mg BID, ZNS increased to 200mg. Vimpat 150mg BID added per General Neurology (had not been given prior to consult). On initial evaluation, patient is not responsive to voice or pain. Upward gaze noted. Decision made to transfer patient to [facility] for higher level of care and  airway watch.       Hospital Course: 06/02/2023 Tachycardic and hypotensive, transferred for development of mostly right hemispheric status, LOC exam remains poor  Intubated for airway protection, EEG has changed to mostly include triphasics with no definite seizure activity per Dr Boss, propofol stopped and AEDs simplified to keppra 1000mg bid only, wean off pressor, give colloids with crystalloid resuscitation  06/03/2023: remains on persistant significant pressor support despite adequate hydration, problems with third spacing and may have pulmonary hypertension as well, consider trial of stress steroids if hgb stabilizes      Interval History:      Review of Systems   Unable to perform ROS: Intubated     Objective:     Vitals:  Temp: 99 °F (37.2 °C)  Pulse: 109  Rhythm: atrial rhythm  BP: (!) 146/72  MAP (mmHg): 102  Resp: (!) 29  SpO2: 96 %  Oxygen Concentration (%): 60  Vent Mode: A/C  Set Rate: 14 BPM  Vt Set: 320 mL  PEEP/CPAP: 5 cmH20  Peak Airway Pressure: 9.6 cmH20  Mean Airway Pressure: 6.5 cmH20  Plateau Pressure: 0 cmH20    Temp  Min: 95.5 °F (35.3 °C)  Max: 99.7 °F (37.6 °C)  Pulse  Min: 37  Max: 122  BP  Min: 93/57  Max: 146/72  MAP (mmHg)  Min: 69  Max: 102  Resp  Min: 17  Max: 33  SpO2  Min: 93 %  Max: 100 %  Oxygen Concentration (%)  Min: 60  Max: 60    06/02 0701 - 06/03 0700  In: 6813.6 [I.V.:6215.3]  Out: 900 [Urine:900]   Unmeasured Output  Urine Occurrence: 0  Stool Occurrence: 1  Emesis Occurrence: 0  Pad Count: 2        Physical Exam  Constitutional:       Comments: obtunded   HENT:      Head: Normocephalic.   Eyes:      Pupils: Pupils are equal, round, and reactive to light.   Cardiovascular:      Rate and Rhythm: Normal rate.   Pulmonary:      Breath sounds: Rales present.   Abdominal:      General: There is distension.      Comments: Firm but compressable   Musculoskeletal:      Cervical back: Neck supple.   Skin:     General: Skin is warm.      Capillary Refill: Capillary refill takes  less than 2 seconds.   Neurological:      Comments: Left sided hemiparesis  Grimaces to noxious            Medications:  ContinuousNORepinephrine bitartrate-D5W, Last Rate: 0.38 mcg/kg/min (06/03/23 1412)  vasopressin    ScheduledARIPiprazole, 5 mg, Daily  cefTRIAXone (ROCEPHIN) IVPB, 2 g, Q24H  folic acid, 1 mg, Daily  lactulose, 200 g, TID  levETIRAcetam (Keppra) IV (PEDS and ADULTS), 500 mg, Q12H  mupirocin, , BID  pantoprozole (PROTONIX) IV, 40 mg, Q12H  pantoprozole (PROTONIX) IV, 80 mg, Once  rifAXIMin, 550 mg, BID  sodium bicarbonate, 1,300 mg, BID  thiamine, 100 mg, Daily    PRNsodium chloride, , Q24H PRN  sodium chloride, , Q24H PRN  sodium chloride, , Q24H PRN  sodium chloride, , Q24H PRN  acetaminophen, 650 mg, Q8H PRN  aluminum-magnesium hydroxide-simethicone, 30 mL, QID PRN  dextrose 10%, 12.5 g, PRN  dextrose 10%, 25 g, PRN  dextrose, 15 g, PRN  dextrose, 30 g, PRN  fentaNYL, 50 mcg, Q1H PRN  glucagon (human recombinant), 1 mg, PRN  insulin aspart U-100, 0-5 Units, QID (AC + HS) PRN  magnesium oxide, 800 mg, PRN  magnesium oxide, 800 mg, PRN  melatonin, 6 mg, Nightly PRN  naloxone, 0.02 mg, PRN  ondansetron, 4 mg, Q8H PRN  potassium bicarbonate, 35 mEq, PRN  potassium bicarbonate, 50 mEq, PRN  potassium bicarbonate, 60 mEq, PRN  potassium, sodium phosphates, 2 packet, PRN  potassium, sodium phosphates, 2 packet, PRN  potassium, sodium phosphates, 2 packet, PRN  simethicone, 1 tablet, QID PRN  sodium chloride 0.9%, 10 mL, Q8H PRN      Today I personally reviewed pertinent medications, lines/drains/airways, imaging, laboratory results, notably:    Diet  Diet NPO  Diet NPO          Assessment/Plan:     Neuro  * Non-convulsive status epilepticus  58F with EtOH induced hepatic cirrhosis, seizure disorder on outpatient LEV and ZNS and bipolar disorder admitted on 5/28 for persistent encephalopathy.    - UTI on admit (Proteus mirabilis), now s/p CTX course  - Hypercalcemia 2/2 lithium toxicity (Lithium  changed to Abilify per Psych)  - Hepatic encephalopathy 2/2 to medication non compliance, treated with lactulose and rifaximin    MRI Brain WO was unremarkable for acute neurologic change  NH4 48.  EEG w/ frequent left hemispheric electrographic seizures lasting on average 10-30 seconds with prolonged seizures over 1.5 hours also noted.     - Admit to [facility] for airway watch   - Q1h neurochecks while in ICU  - Q1h vitals while in ICU  - HOB@30  - Keppra 1500mg BID  - Zonisamide 150mg BID  - Vimpat 150 BID  - Thiamine  - MAP>65  - Daily CMP, Mag, Phos - replete electrolytes PRN  - Lipid panel, A1c, Coags reviewed  - Daily CBC, transfuse PRN  - Start SubQ Heparin in when clinically indicated   - PT/OT/SLP as appropriate  - CM/SW consult for dispo planning  06/02: per epilepsy team, no seizures, on keppra only which is 50/50 blood and kidney metabolized, hopefully mental status improves with clearance of ativan  06/03: no seizures overnight, D/C EEG    Renal/  Hypernatremia  Daily CMP  likly secondary to chronic liver disease and loss of oncotic pressure    Hematology  Thrombocytopenia  Likely secondary to hepatic cirrhosis and development of splenomegaly  Has worsened and associated with hgb drop, transfuse to >50K    Endocrine  Hyperammonemia  See Non-convulsive status epilepticus  Controlled on present regimen    Appreciate hepatology help, lactulose suppositories    GI  Hepatic cirrhosis  Continue Lactulose and Rifaximin   concern that this is driving the hypotension and other complications  Add vasopressin, albumin reasonable for now, consider stress steroids if hgb stable  Follow coags and fibrinogen q12 and cbc q6  Start SBP prophylaxis with ceftriaxone        Hepatic encephalopathy  See Non-convulsive status epilepticus  Repeat ammonia 38, continue lactulose regimen          The patient is being Prophylaxed for:  Venous Thromboembolism with: Mechanical  Stress Ulcer with: H2B  Ventilator Pneumonia with:  chlorhexidine oral care    Activity Orders          Diet NPO: NPO starting at 06/01 1055    Turn patient starting at 05/28 6461    Progressive Mobility Protocol (mobilize patient to their highest level of functioning at least twice daily) starting at 05/28 2000      daughter updated  CRC 40 min not including procedure time  Full Code    Rafi Veronica MD  Neurocritical Care  [facility] - Neuro Critical Care

## 2023-06-03 NOTE — ASSESSMENT & PLAN NOTE
58F with EtOH induced hepatic cirrhosis, seizure disorder on outpatient LEV and ZNS and bipolar disorder admitted on 5/28 for persistent encephalopathy.    - UTI on admit (Proteus mirabilis), now s/p CTX course  - Hypercalcemia 2/2 lithium toxicity (Lithium changed to Abilify per Psych)  - Hepatic encephalopathy 2/2 to medication non compliance, treated with lactulose and rifaximin    MRI Brain WO was unremarkable for acute neurologic change  NH4 48.  EEG w/ frequent left hemispheric electrographic seizures lasting on average 10-30 seconds with prolonged seizures over 1.5 hours also noted.     - Admit to [facility] for airway watch   - Q1h neurochecks while in ICU  - Q1h vitals while in ICU  - HOB@30  - Keppra 1500mg BID  - Zonisamide 150mg BID  - Vimpat 150 BID  - Thiamine  - MAP>65  - Daily CMP, Mag, Phos - replete electrolytes PRN  - Lipid panel, A1c, Coags reviewed  - Daily CBC, transfuse PRN  - Start SubQ Heparin in when clinically indicated   - PT/OT/SLP as appropriate  - CM/SW consult for dispo planning  06/02: per epilepsy team, no seizures, on keppra only which is 50/50 blood and kidney metabolized, hopefully mental status improves with clearance of ativan  06/03: no seizures overnight, D/C EEG

## 2023-06-03 NOTE — ASSESSMENT & PLAN NOTE
See Non-convulsive status epilepticus  Controlled on present regimen    Appreciate hepatology help, lactulose suppositories

## 2023-06-04 PROBLEM — I95.9 HYPOTENSION (ARTERIAL): Status: ACTIVE | Noted: 2023-06-04

## 2023-06-04 LAB
ALBUMIN SERPL BCP-MCNC: 3.3 G/DL (ref 3.5–5.2)
ALLENS TEST: ABNORMAL
ALP SERPL-CCNC: 122 U/L (ref 55–135)
ALT SERPL W/O P-5'-P-CCNC: 38 U/L (ref 10–44)
ANION GAP SERPL CALC-SCNC: 9 MMOL/L (ref 8–16)
ANISOCYTOSIS BLD QL SMEAR: ABNORMAL
ANISOCYTOSIS BLD QL SMEAR: ABNORMAL
ANISOCYTOSIS BLD QL SMEAR: SLIGHT
ANISOCYTOSIS BLD QL SMEAR: SLIGHT
APTT PPP: 38.7 SEC (ref 21–32)
APTT PPP: 40.3 SEC (ref 21–32)
APTT PPP: 44.4 SEC (ref 21–32)
ASCENDING AORTA: 2.96 CM
AST SERPL-CCNC: 43 U/L (ref 10–40)
AV INDEX (PROSTH): 0.75
AV MEAN GRADIENT: 7 MMHG
AV PEAK GRADIENT: 11 MMHG
AV VALVE AREA: 2.1 CM2
AV VELOCITY RATIO: 0.75
BASO STIPL BLD QL SMEAR: ABNORMAL
BASOPHILS # BLD AUTO: 0 K/UL (ref 0–0.2)
BASOPHILS # BLD AUTO: 0 K/UL (ref 0–0.2)
BASOPHILS # BLD AUTO: 0.01 K/UL (ref 0–0.2)
BASOPHILS # BLD AUTO: 0.01 K/UL (ref 0–0.2)
BASOPHILS NFR BLD: 0 % (ref 0–1.9)
BASOPHILS NFR BLD: 0 % (ref 0–1.9)
BASOPHILS NFR BLD: 0.1 % (ref 0–1.9)
BASOPHILS NFR BLD: 0.2 % (ref 0–1.9)
BILIRUB DIRECT SERPL-MCNC: 1.6 MG/DL (ref 0.1–0.3)
BILIRUB SERPL-MCNC: 5 MG/DL (ref 0.1–1)
BLD PROD TYP BPU: NORMAL
BLD PROD TYP BPU: NORMAL
BLOOD UNIT EXPIRATION DATE: NORMAL
BLOOD UNIT EXPIRATION DATE: NORMAL
BLOOD UNIT TYPE CODE: 5100
BLOOD UNIT TYPE CODE: 5100
BLOOD UNIT TYPE: NORMAL
BLOOD UNIT TYPE: NORMAL
BSA FOR ECHO PROCEDURE: 1.62 M2
BUN SERPL-MCNC: 15 MG/DL (ref 6–20)
BURR CELLS BLD QL SMEAR: ABNORMAL
CALCIUM SERPL-MCNC: 7.5 MG/DL (ref 8.7–10.5)
CHLORIDE SERPL-SCNC: 111 MMOL/L (ref 95–110)
CO2 SERPL-SCNC: 23 MMOL/L (ref 23–29)
CODING SYSTEM: NORMAL
CODING SYSTEM: NORMAL
CREAT SERPL-MCNC: 0.6 MG/DL (ref 0.5–1.4)
CROSSMATCH INTERPRETATION: NORMAL
CROSSMATCH INTERPRETATION: NORMAL
CV ECHO LV RWT: 0.37 CM
DELSYS: ABNORMAL
DIFFERENTIAL METHOD: ABNORMAL
DISPENSE STATUS: NORMAL
DISPENSE STATUS: NORMAL
DOHLE BOD BLD QL SMEAR: PRESENT
DOP CALC AO PEAK VEL: 1.65 M/S
DOP CALC AO VTI: 30.63 CM
DOP CALC LVOT AREA: 2.8 CM2
DOP CALC LVOT DIAMETER: 1.89 CM
DOP CALC LVOT PEAK VEL: 1.24 M/S
DOP CALC LVOT STROKE VOLUME: 64.47 CM3
DOP CALCLVOT PEAK VEL VTI: 22.99 CM
E WAVE DECELERATION TIME: 108.18 MSEC
E/A RATIO: 1.25
E/E' RATIO: 7.56 M/S
ECHO LV POSTERIOR WALL: 0.71 CM (ref 0.6–1.1)
EJECTION FRACTION: 70 %
EOSINOPHIL # BLD AUTO: 0 K/UL (ref 0–0.5)
EOSINOPHIL # BLD AUTO: 0.1 K/UL (ref 0–0.5)
EOSINOPHIL NFR BLD: 0.5 % (ref 0–8)
EOSINOPHIL NFR BLD: 0.8 % (ref 0–8)
EOSINOPHIL NFR BLD: 0.8 % (ref 0–8)
EOSINOPHIL NFR BLD: 1.3 % (ref 0–8)
ERYTHROCYTE [DISTWIDTH] IN BLOOD BY AUTOMATED COUNT: 23.4 % (ref 11.5–14.5)
ERYTHROCYTE [DISTWIDTH] IN BLOOD BY AUTOMATED COUNT: 23.6 % (ref 11.5–14.5)
ERYTHROCYTE [DISTWIDTH] IN BLOOD BY AUTOMATED COUNT: 24.8 % (ref 11.5–14.5)
ERYTHROCYTE [DISTWIDTH] IN BLOOD BY AUTOMATED COUNT: 24.9 % (ref 11.5–14.5)
ERYTHROCYTE [SEDIMENTATION RATE] IN BLOOD BY WESTERGREN METHOD: 14 MM/H
EST. GFR  (NO RACE VARIABLE): >60 ML/MIN/1.73 M^2
FIBRINOGEN PPP-MCNC: 183 MG/DL (ref 182–400)
FIBRINOGEN PPP-MCNC: 196 MG/DL (ref 182–400)
FIBRINOGEN PPP-MCNC: 198 MG/DL (ref 182–400)
FIO2: 50
FRACTIONAL SHORTENING: 28 % (ref 28–44)
GLUCOSE SERPL-MCNC: 189 MG/DL (ref 70–110)
HCO3 UR-SCNC: 20.7 MMOL/L (ref 24–28)
HCT VFR BLD AUTO: 20.4 % (ref 37–48.5)
HCT VFR BLD AUTO: 22.6 % (ref 37–48.5)
HCT VFR BLD AUTO: 23.1 % (ref 37–48.5)
HCT VFR BLD AUTO: 23.6 % (ref 37–48.5)
HGB BLD-MCNC: 6.4 G/DL (ref 12–16)
HGB BLD-MCNC: 7.2 G/DL (ref 12–16)
HGB BLD-MCNC: 7.3 G/DL (ref 12–16)
HGB BLD-MCNC: 7.7 G/DL (ref 12–16)
HYPOCHROMIA BLD QL SMEAR: ABNORMAL
IMM GRANULOCYTES # BLD AUTO: 0.02 K/UL (ref 0–0.04)
IMM GRANULOCYTES # BLD AUTO: 0.03 K/UL (ref 0–0.04)
IMM GRANULOCYTES # BLD AUTO: 0.05 K/UL (ref 0–0.04)
IMM GRANULOCYTES # BLD AUTO: 0.07 K/UL (ref 0–0.04)
IMM GRANULOCYTES NFR BLD AUTO: 0.3 % (ref 0–0.5)
IMM GRANULOCYTES NFR BLD AUTO: 0.5 % (ref 0–0.5)
IMM GRANULOCYTES NFR BLD AUTO: 0.6 % (ref 0–0.5)
IMM GRANULOCYTES NFR BLD AUTO: 0.7 % (ref 0–0.5)
INR PPP: 1.8 (ref 0.8–1.2)
INR PPP: 1.9 (ref 0.8–1.2)
INTERVENTRICULAR SEPTUM: 0.95 CM (ref 0.6–1.1)
IVRT: 70.41 MSEC
LA MAJOR: 6.06 CM
LA MINOR: 5.87 CM
LA WIDTH: 4.46 CM
LACTATE SERPL-SCNC: 1.6 MMOL/L (ref 0.5–2.2)
LEFT ATRIUM SIZE: 2.93 CM
LEFT ATRIUM VOLUME INDEX MOD: 31.3 ML/M2
LEFT ATRIUM VOLUME INDEX: 41.4 ML/M2
LEFT ATRIUM VOLUME MOD: 50 CM3
LEFT ATRIUM VOLUME: 66.24 CM3
LEFT INTERNAL DIMENSION IN SYSTOLE: 2.8 CM (ref 2.1–4)
LEFT VENTRICLE DIASTOLIC VOLUME INDEX: 40.94 ML/M2
LEFT VENTRICLE DIASTOLIC VOLUME: 65.51 ML
LEFT VENTRICLE MASS INDEX: 59 G/M2
LEFT VENTRICLE SYSTOLIC VOLUME INDEX: 18.4 ML/M2
LEFT VENTRICLE SYSTOLIC VOLUME: 29.45 ML
LEFT VENTRICULAR INTERNAL DIMENSION IN DIASTOLE: 3.89 CM (ref 3.5–6)
LEFT VENTRICULAR MASS: 93.86 G
LV LATERAL E/E' RATIO: 7.56 M/S
LV SEPTAL E/E' RATIO: 7.56 M/S
LYMPHOCYTES # BLD AUTO: 0.4 K/UL (ref 1–4.8)
LYMPHOCYTES # BLD AUTO: 0.7 K/UL (ref 1–4.8)
LYMPHOCYTES # BLD AUTO: 0.8 K/UL (ref 1–4.8)
LYMPHOCYTES # BLD AUTO: 0.8 K/UL (ref 1–4.8)
LYMPHOCYTES NFR BLD: 10.5 % (ref 18–48)
LYMPHOCYTES NFR BLD: 6.6 % (ref 18–48)
LYMPHOCYTES NFR BLD: 7.8 % (ref 18–48)
LYMPHOCYTES NFR BLD: 9.5 % (ref 18–48)
MAGNESIUM SERPL-MCNC: 2.1 MG/DL (ref 1.6–2.6)
MCH RBC QN AUTO: 31.7 PG (ref 27–31)
MCH RBC QN AUTO: 31.8 PG (ref 27–31)
MCH RBC QN AUTO: 31.8 PG (ref 27–31)
MCH RBC QN AUTO: 31.9 PG (ref 27–31)
MCHC RBC AUTO-ENTMCNC: 31.4 G/DL (ref 32–36)
MCHC RBC AUTO-ENTMCNC: 31.6 G/DL (ref 32–36)
MCHC RBC AUTO-ENTMCNC: 31.9 G/DL (ref 32–36)
MCHC RBC AUTO-ENTMCNC: 32.6 G/DL (ref 32–36)
MCV RBC AUTO: 100 FL (ref 82–98)
MCV RBC AUTO: 100 FL (ref 82–98)
MCV RBC AUTO: 102 FL (ref 82–98)
MCV RBC AUTO: 98 FL (ref 82–98)
MODE: ABNORMAL
MONOCYTES # BLD AUTO: 0.2 K/UL (ref 0.3–1)
MONOCYTES # BLD AUTO: 0.4 K/UL (ref 0.3–1)
MONOCYTES # BLD AUTO: 0.4 K/UL (ref 0.3–1)
MONOCYTES # BLD AUTO: 0.5 K/UL (ref 0.3–1)
MONOCYTES NFR BLD: 3.1 % (ref 4–15)
MONOCYTES NFR BLD: 4.5 % (ref 4–15)
MONOCYTES NFR BLD: 4.9 % (ref 4–15)
MONOCYTES NFR BLD: 5.8 % (ref 4–15)
MV PEAK A VEL: 0.97 M/S
MV PEAK E VEL: 1.21 M/S
MV STENOSIS PRESSURE HALF TIME: 31.37 MS
MV VALVE AREA P 1/2 METHOD: 7.01 CM2
NEUTROPHILS # BLD AUTO: 4.9 K/UL (ref 1.8–7.7)
NEUTROPHILS # BLD AUTO: 5.1 K/UL (ref 1.8–7.7)
NEUTROPHILS # BLD AUTO: 7.2 K/UL (ref 1.8–7.7)
NEUTROPHILS # BLD AUTO: 8.6 K/UL (ref 1.8–7.7)
NEUTROPHILS NFR BLD: 81.9 % (ref 38–73)
NEUTROPHILS NFR BLD: 84.2 % (ref 38–73)
NEUTROPHILS NFR BLD: 86.1 % (ref 38–73)
NEUTROPHILS NFR BLD: 89.3 % (ref 38–73)
NRBC BLD-RTO: 0 /100 WBC
OVALOCYTES BLD QL SMEAR: ABNORMAL
PCO2 BLDA: 33.7 MMHG (ref 35–45)
PEEP: 5
PH SMN: 7.4 [PH] (ref 7.35–7.45)
PHOSPHATE SERPL-MCNC: 2.2 MG/DL (ref 2.7–4.5)
PIP: 19
PISA TR MAX VEL: 2 M/S
PLATELET # BLD AUTO: 45 K/UL (ref 150–450)
PLATELET # BLD AUTO: 53 K/UL (ref 150–450)
PLATELET # BLD AUTO: 69 K/UL (ref 150–450)
PLATELET # BLD AUTO: 88 K/UL (ref 150–450)
PLATELET BLD QL SMEAR: ABNORMAL
PMV BLD AUTO: 10 FL (ref 9.2–12.9)
PMV BLD AUTO: 10.9 FL (ref 9.2–12.9)
PMV BLD AUTO: 11.1 FL (ref 9.2–12.9)
PMV BLD AUTO: 11.2 FL (ref 9.2–12.9)
PO2 BLDA: 77 MMHG (ref 80–100)
POC BE: -4 MMOL/L
POC SATURATED O2: 95 % (ref 95–100)
POC TCO2: 22 MMOL/L (ref 23–27)
POCT GLUCOSE: 147 MG/DL (ref 70–110)
POCT GLUCOSE: 152 MG/DL (ref 70–110)
POCT GLUCOSE: 170 MG/DL (ref 70–110)
POCT GLUCOSE: 191 MG/DL (ref 70–110)
POIKILOCYTOSIS BLD QL SMEAR: SLIGHT
POLYCHROMASIA BLD QL SMEAR: ABNORMAL
POTASSIUM SERPL-SCNC: 3.3 MMOL/L (ref 3.5–5.1)
PROT SERPL-MCNC: 5.2 G/DL (ref 6–8.4)
PROTHROMBIN TIME: 18.9 SEC (ref 9–12.5)
PROTHROMBIN TIME: 19.4 SEC (ref 9–12.5)
RA MAJOR: 5.33 CM
RA WIDTH: 3.4 CM
RBC # BLD AUTO: 2.01 M/UL (ref 4–5.4)
RBC # BLD AUTO: 2.26 M/UL (ref 4–5.4)
RBC # BLD AUTO: 2.3 M/UL (ref 4–5.4)
RBC # BLD AUTO: 2.42 M/UL (ref 4–5.4)
RIGHT VENTRICULAR END-DIASTOLIC DIMENSION: 3.07 CM
RV TISSUE DOPPLER FREE WALL SYSTOLIC VELOCITY 1 (APICAL 4 CHAMBER VIEW): 21 CM/S
SAMPLE: ABNORMAL
SCHISTOCYTES BLD QL SMEAR: ABNORMAL
SCHISTOCYTES BLD QL SMEAR: PRESENT
SINUS: 3.16 CM
SITE: ABNORMAL
SODIUM SERPL-SCNC: 143 MMOL/L (ref 136–145)
SP02: 96
SPHEROCYTES BLD QL SMEAR: ABNORMAL
STJ: 2.94 CM
TDI LATERAL: 0.16 M/S
TDI SEPTAL: 0.16 M/S
TDI: 0.16 M/S
TOXIC GRANULES BLD QL SMEAR: PRESENT
TOXIC GRANULES BLD QL SMEAR: PRESENT
TR MAX PG: 16 MMHG
TRANS ERYTHROCYTES VOL PATIENT: NORMAL ML
TRICUSPID ANNULAR PLANE SYSTOLIC EXCURSION: 2.29 CM
UNIT NUMBER: NORMAL
VT: 320
WBC # BLD AUTO: 10.03 K/UL (ref 3.9–12.7)
WBC # BLD AUTO: 5.46 K/UL (ref 3.9–12.7)
WBC # BLD AUTO: 6.26 K/UL (ref 3.9–12.7)
WBC # BLD AUTO: 8.52 K/UL (ref 3.9–12.7)

## 2023-06-04 PROCEDURE — P9035 PLATELET PHERES LEUKOREDUCED: HCPCS | Performed by: PHYSICIAN ASSISTANT

## 2023-06-04 PROCEDURE — 82248 BILIRUBIN DIRECT: CPT | Performed by: PSYCHIATRY & NEUROLOGY

## 2023-06-04 PROCEDURE — 25000003 PHARM REV CODE 250: Performed by: NURSE PRACTITIONER

## 2023-06-04 PROCEDURE — 99291 CRITICAL CARE FIRST HOUR: CPT | Mod: ,,, | Performed by: PSYCHIATRY & NEUROLOGY

## 2023-06-04 PROCEDURE — P9021 RED BLOOD CELLS UNIT: HCPCS | Performed by: PHYSICIAN ASSISTANT

## 2023-06-04 PROCEDURE — 99291 PR CRITICAL CARE, E/M 30-74 MINUTES: ICD-10-PCS | Mod: ,,, | Performed by: PSYCHIATRY & NEUROLOGY

## 2023-06-04 PROCEDURE — 84100 ASSAY OF PHOSPHORUS: CPT | Performed by: STUDENT IN AN ORGANIZED HEALTH CARE EDUCATION/TRAINING PROGRAM

## 2023-06-04 PROCEDURE — 94761 N-INVAS EAR/PLS OXIMETRY MLT: CPT

## 2023-06-04 PROCEDURE — 82803 BLOOD GASES ANY COMBINATION: CPT

## 2023-06-04 PROCEDURE — 85610 PROTHROMBIN TIME: CPT | Performed by: PSYCHIATRY & NEUROLOGY

## 2023-06-04 PROCEDURE — 25000003 PHARM REV CODE 250: Performed by: STUDENT IN AN ORGANIZED HEALTH CARE EDUCATION/TRAINING PROGRAM

## 2023-06-04 PROCEDURE — 99900026 HC AIRWAY MAINTENANCE (STAT)

## 2023-06-04 PROCEDURE — P9047 ALBUMIN (HUMAN), 25%, 50ML: HCPCS | Mod: JZ,JG | Performed by: PHYSICIAN ASSISTANT

## 2023-06-04 PROCEDURE — 25500020 PHARM REV CODE 255: Performed by: PSYCHIATRY & NEUROLOGY

## 2023-06-04 PROCEDURE — 83735 ASSAY OF MAGNESIUM: CPT | Performed by: STUDENT IN AN ORGANIZED HEALTH CARE EDUCATION/TRAINING PROGRAM

## 2023-06-04 PROCEDURE — 63600175 PHARM REV CODE 636 W HCPCS: Performed by: STUDENT IN AN ORGANIZED HEALTH CARE EDUCATION/TRAINING PROGRAM

## 2023-06-04 PROCEDURE — 37799 UNLISTED PX VASCULAR SURGERY: CPT

## 2023-06-04 PROCEDURE — 20000000 HC ICU ROOM

## 2023-06-04 PROCEDURE — 63600175 PHARM REV CODE 636 W HCPCS: Performed by: PSYCHIATRY & NEUROLOGY

## 2023-06-04 PROCEDURE — 25000003 PHARM REV CODE 250: Performed by: PHYSICIAN ASSISTANT

## 2023-06-04 PROCEDURE — 83605 ASSAY OF LACTIC ACID: CPT | Performed by: PSYCHIATRY & NEUROLOGY

## 2023-06-04 PROCEDURE — 85730 THROMBOPLASTIN TIME PARTIAL: CPT | Mod: 91 | Performed by: PSYCHIATRY & NEUROLOGY

## 2023-06-04 PROCEDURE — 25000003 PHARM REV CODE 250: Performed by: PSYCHIATRY & NEUROLOGY

## 2023-06-04 PROCEDURE — 27200966 HC CLOSED SUCTION SYSTEM

## 2023-06-04 PROCEDURE — 63600175 PHARM REV CODE 636 W HCPCS: Mod: JZ,JG | Performed by: PHYSICIAN ASSISTANT

## 2023-06-04 PROCEDURE — 25000003 PHARM REV CODE 250

## 2023-06-04 PROCEDURE — 85384 FIBRINOGEN ACTIVITY: CPT | Mod: 91 | Performed by: PSYCHIATRY & NEUROLOGY

## 2023-06-04 PROCEDURE — 63600175 PHARM REV CODE 636 W HCPCS: Performed by: NURSE PRACTITIONER

## 2023-06-04 PROCEDURE — 27000221 HC OXYGEN, UP TO 24 HOURS

## 2023-06-04 PROCEDURE — C9113 INJ PANTOPRAZOLE SODIUM, VIA: HCPCS | Performed by: STUDENT IN AN ORGANIZED HEALTH CARE EDUCATION/TRAINING PROGRAM

## 2023-06-04 PROCEDURE — 99900035 HC TECH TIME PER 15 MIN (STAT)

## 2023-06-04 PROCEDURE — 94003 VENT MGMT INPAT SUBQ DAY: CPT

## 2023-06-04 PROCEDURE — 85025 COMPLETE CBC W/AUTO DIFF WBC: CPT | Mod: 91 | Performed by: PSYCHIATRY & NEUROLOGY

## 2023-06-04 PROCEDURE — 80053 COMPREHEN METABOLIC PANEL: CPT | Performed by: STUDENT IN AN ORGANIZED HEALTH CARE EDUCATION/TRAINING PROGRAM

## 2023-06-04 RX ORDER — SODIUM,POTASSIUM PHOSPHATES 280-250MG
1 POWDER IN PACKET (EA) ORAL ONCE
Status: COMPLETED | OUTPATIENT
Start: 2023-06-04 | End: 2023-06-04

## 2023-06-04 RX ORDER — ALBUMIN HUMAN 250 G/1000ML
25 SOLUTION INTRAVENOUS ONCE
Status: COMPLETED | OUTPATIENT
Start: 2023-06-04 | End: 2023-06-04

## 2023-06-04 RX ADMIN — POTASSIUM BICARBONATE 25 MEQ: 978 TABLET, EFFERVESCENT ORAL at 10:06

## 2023-06-04 RX ADMIN — PANTOPRAZOLE SODIUM 40 MG: 40 INJECTION, POWDER, FOR SOLUTION INTRAVENOUS at 08:06

## 2023-06-04 RX ADMIN — ARIPIPRAZOLE 5 MG: 5 TABLET ORAL at 08:06

## 2023-06-04 RX ADMIN — VASOPRESSIN 0.04 UNITS/MIN: 20 INJECTION INTRAVENOUS at 07:06

## 2023-06-04 RX ADMIN — HYDROCORTISONE SODIUM SUCCINATE 50 MG: 100 INJECTION, POWDER, FOR SOLUTION INTRAMUSCULAR; INTRAVENOUS at 10:06

## 2023-06-04 RX ADMIN — HYDROCORTISONE SODIUM SUCCINATE 50 MG: 100 INJECTION, POWDER, FOR SOLUTION INTRAMUSCULAR; INTRAVENOUS at 02:06

## 2023-06-04 RX ADMIN — LEVETIRACETAM 500 MG: 100 INJECTION, SOLUTION INTRAVENOUS at 08:06

## 2023-06-04 RX ADMIN — MUPIROCIN: 20 OINTMENT TOPICAL at 08:06

## 2023-06-04 RX ADMIN — FOLIC ACID 1 MG: 1 TABLET ORAL at 08:06

## 2023-06-04 RX ADMIN — FENTANYL CITRATE 50 MCG: 0.05 INJECTION, SOLUTION INTRAMUSCULAR; INTRAVENOUS at 04:06

## 2023-06-04 RX ADMIN — POTASSIUM & SODIUM PHOSPHATES POWDER PACK 280-160-250 MG 1 PACKET: 280-160-250 PACK at 10:06

## 2023-06-04 RX ADMIN — IOHEXOL 75 ML: 350 INJECTION, SOLUTION INTRAVENOUS at 10:06

## 2023-06-04 RX ADMIN — NOREPINEPHRINE BITARTRATE 0.2 MCG/KG/MIN: 8 INJECTION, SOLUTION INTRAVENOUS at 05:06

## 2023-06-04 RX ADMIN — FENTANYL CITRATE 50 MCG: 0.05 INJECTION, SOLUTION INTRAMUSCULAR; INTRAVENOUS at 12:06

## 2023-06-04 RX ADMIN — SODIUM BICARBONATE 1300 MG: 650 TABLET ORAL at 08:06

## 2023-06-04 RX ADMIN — FENTANYL CITRATE 50 MCG: 0.05 INJECTION, SOLUTION INTRAMUSCULAR; INTRAVENOUS at 08:06

## 2023-06-04 RX ADMIN — LACTULOSE 200 G: 10 SOLUTION ORAL at 09:06

## 2023-06-04 RX ADMIN — RIFAXIMIN 550 MG: 550 TABLET ORAL at 08:06

## 2023-06-04 RX ADMIN — ALBUMIN HUMAN 25 G: 0.25 SOLUTION INTRAVENOUS at 01:06

## 2023-06-04 RX ADMIN — LACTULOSE 200 G: 10 SOLUTION ORAL at 10:06

## 2023-06-04 RX ADMIN — FENTANYL CITRATE 50 MCG: 0.05 INJECTION, SOLUTION INTRAMUSCULAR; INTRAVENOUS at 10:06

## 2023-06-04 RX ADMIN — Medication 100 MG: at 08:06

## 2023-06-04 RX ADMIN — LACTULOSE 200 G: 10 SOLUTION ORAL at 02:06

## 2023-06-04 RX ADMIN — CEFTRIAXONE 2 G: 2 INJECTION, POWDER, FOR SOLUTION INTRAMUSCULAR; INTRAVENOUS at 02:06

## 2023-06-04 NOTE — PLAN OF CARE
[facility] Care Plan    POC reviewed with Marely Hamilton and family at 0300. Pt verbalized understanding. Questions and concerns addressed. No acute events overnight. Pt progressing toward goals. Will continue to monitor. See below and flowsheets for full assessment and VS info.     - Platelets given x2  - RBC given x1  - Bath complete  - Albumin given  - trending CBC q4  - Vaso, Levo (8/250) titrated to maintain MAP >65  - Saldaña to gravity  - Flexi in place      Is this a stroke patient? no    Neuro:  [place] Coma Scale  Best Eye Response: 1-->(E1) none  Best Motor Response: 3-->(M3) flexion to pain  Best Verbal Response: 1-->(V1) none  [place] Coma Scale Score: 5  Assessment Qualifiers: patient intubated  Pupil PERRLA: no     24hr Temp:  [95.2 °F (35.1 °C)-99.7 °F (37.6 °C)]     CV:   Rhythm: normal sinus rhythm, atrial rhythm  BP goals:   SBP < 180  MAP > 65    Resp:      Vent Mode: A/C  Set Rate: 14 BPM  Oxygen Concentration (%): 50  Vt Set: 320 mL  PEEP/CPAP: 5 cmH20    Plan: wean to extubate    GI/:     Diet/Nutrition Received: NPO  Last Bowel Movement: 06/03/23  Voiding Characteristics: urethral catheter (bladder)    Intake/Output Summary (Last 24 hours) at 6/4/2023 0649  Last data filed at 6/4/2023 0601  Gross per 24 hour   Intake 4934.01 ml   Output 3155 ml   Net 1779.01 ml     Unmeasured Output  Urine Occurrence: 0  Stool Occurrence: 1  Emesis Occurrence: 0  Pad Count: 2    Labs/Accuchecks:  Recent Labs   Lab 06/04/23  0355   WBC 8.52   RBC 2.01*   HGB 6.4*   HCT 20.4*   PLT 88*      Recent Labs   Lab 06/04/23  0355      K 3.3*   CO2 23   *   BUN 15   CREATININE 0.6   ALKPHOS 122   ALT 38   AST 43*   BILITOT 5.0*      Recent Labs   Lab 06/04/23  0355   INR 1.8*   APTT 40.3*    No results for input(s): CPK, CPKMB, TROPONINI, MB in the last 168 hours.    Electrolytes: Electrolytes replaced  Accuchecks: ACHS    Gtts:   NORepinephrine bitartrate-D5W 0.2 mcg/kg/min (06/04/23 0601)    vasopressin 0.04  Units/min (06/04/23 0601)       LDA/Wounds:  Lines/Drains/Airways       Central Venous Catheter Line  Duration             Percutaneous Central Line Insertion/Assessment - Triple Lumen  06/03/23 1345 Femoral Right <1 day              Drain  Duration                  NG/OG Tube 06/01/23 2200 Right nostril 2 days         Urethral Catheter 06/01/23 2101 Temperature probe 2 days         Rectal Tube 06/03/23 1300 <1 day              Airway  Duration                  Airway - Non-Surgical 06/02/23 1118 Endotracheal Tube 1 day              Arterial Line  Duration             Arterial Line 06/02/23 1050 Left Radial 1 day              Peripheral Intravenous Line  Duration                  Midline Catheter Insertion/Assessment  - Single Lumen 05/25/23 1005 Right basilic vein (medial side of arm) other (see comments) 9 days         Peripheral IV - Single Lumen 06/02/23 0030 20 G Anterior;Left Foot 2 days         Peripheral IV - Single Lumen 06/02/23 1130 18 G;1 3/4 in Left Upper Arm 1 day         Peripheral IV - Single Lumen 06/02/23 2200 20 G Anterior;Right Ankle 1 day                  Wounds: Yes  Wound care consulted: Yes

## 2023-06-04 NOTE — NURSING
Pt arrived back to room following CT        Pt was escorted by RN and second RN on cardiac monitoring, O2, ambu bag, portable vent, and IV pole.        Patient placed back on bedside monitor with alarms audible, bed in low position with bed alarm on, call light within reach. KYLIE.

## 2023-06-04 NOTE — ASSESSMENT & PLAN NOTE
Likely secondary to hepatic cirrhosis and development of splenomegaly  Has worsened and associated with hgb drop, transfuse to >50K  No signs of bleeding in GI tract or on CT, consider hemolysis

## 2023-06-04 NOTE — TREATMENT PLAN
Hepatology Treatment Plan    Marely Hamilton is a 58 y.o. female admitted to hospital 5/28/2023 (Hospital Day: 8) due to Non-convulsive status epilepticus.     Interval History  Patient evaluated by GI yesterday with regards to progressive anemia; no endoscopic intervention planned given lack of overt signs of GI bleeding. EEG overnight without recurrent seizures and more consistent with encephalopathy. She remains intubated and requiring vasopressor support. Nursing team deny noticing bloody output from OG or rectal tube.     Objective  Temp:  [95.2 °F (35.1 °C)-99.7 °F (37.6 °C)] 97.5 °F (36.4 °C) (06/04 1102)  Pulse:  [] 61 (06/04 1102)  BP: (114-150)/(56-84) 138/68 (06/04 1001)  Resp:  [14-35] 15 (06/04 1102)  SpO2:  [93 %-99 %] 95 % (06/04 1102)  Arterial Line BP: (113-162)/(41-87) 121/57 (06/04 1102)    General: Intubated  female; no acute distress.   HEENT: OG in place containing clear brown output.   Abdomen: Non-distended. Soft. Rectal tube in place containing liquid brown output.   MSK: No edema.     Laboratory    Lab Results   Component Value Date    WBC 8.52 06/04/2023    HGB 6.4 (L) 06/04/2023    HCT 20.4 (L) 06/04/2023     (H) 06/04/2023    PLT 88 (L) 06/04/2023       Lab Results   Component Value Date     06/04/2023    K 3.3 (L) 06/04/2023     (H) 06/04/2023    CO2 23 06/04/2023    BUN 15 06/04/2023    CREATININE 0.6 06/04/2023    CALCIUM 7.5 (L) 06/04/2023       Lab Results   Component Value Date    ALBUMIN 3.3 (L) 06/04/2023    ALT 38 06/04/2023    AST 43 (H) 06/04/2023     (H) 06/02/2020    ALKPHOS 122 06/04/2023    BILITOT 5.0 (H) 06/04/2023       Lab Results   Component Value Date    INR 1.9 (H) 06/04/2023    INR 1.8 (H) 06/04/2023    INR 1.8 (H) 06/03/2023       MELD-Na: 20 at 6/4/2023  8:08 AM  MELD: 20 at 6/4/2023  8:08 AM  Calculated from:  Serum Creatinine: 0.6 mg/dL (Using min of 1 mg/dL) at 6/4/2023  3:55 AM  Serum Sodium: 143 mmol/L (Using max of  137 mmol/L) at 6/4/2023  3:55 AM  Total Bilirubin: 5.0 mg/dL at 6/4/2023  3:55 AM  INR(ratio): 1.9 at 6/4/2023  8:08 AM      Assessment  This is a 58 year old female with PMH significant for ETOH cirrhosis (associated with ascites and encephalopathy; declined for transplant here in 12/2015 due to malnutrition), bipolar disorder (on Lithium), and unspecified seizure disorder (on AEDs) who was admitted to [facility] on 05/28 as a transfer from [facility] for management of recurrent hepatic encephalopathy following initial admission on 05/18 for acute onset of encephalopathy associated with UTI secondary to Proteus, hypernatremia, and hypercalcemia. She is status post treatment for UTI and electrolyte derangements without improvement in encephalopathy; CT and MRI brain unremarkable and initial EEG at [place] negative for seizures. She is status post evaluation by psychiatry here with low suspicion for psychiatric disorder contributing to encephalopathy. On chart review, patient noted to have recurrent monthly admissions since 01/2023 for hepatic encephalopathy suspected from non-compliance with Lactulose, however her continued encephalopathy since admission seemed atypical for purely hepatic component given re-initiation of Lactulose. Repeat EEG obtained on 05/31 concerning for frequent focal seizure activity approaching status epilepticus; AED's increased and patient transferred to neuro.ICU on 06/01 for closer monitoring with eventual need for intubation for airway protection on 06/02. EEG as of 06/02 without recurrent seizures and more consistent with severe encephalopathy. Patient now with progressive anemia and coagulopathy, but without overt signs of bleeding.      Recommendations:      -Agree with Ceftriaxone for SBP coverage given shock; repeat blood cultures.    -Follow-up 24 hour urine copper to rule out Ronnie's disease.   -Monitor for overt signs of bleeding; recommend repeat hemolysis work-up (LDH,  haptoglobin, reticulocyte count, direct hyperbilirubinemia and direct Ameya test).   -Thiamine supplement daily given low Niacin levels.   -Lactulose TID and Rifaximin BID; titrate Lactulose to 3-4 bowel movements daily. If no effect with oral, then use Lactulose enemas.   -Okay to hold home diuretics without hypervolemia noted on exam.   -Minimize use of medications that may precipitate encephalopathy (e.g. opioids and benzos).   -CMP and INR daily.   -Despite MELD score of 20, no plans for re-initiation of transplant evaluation given encephalopathy limiting ability to confirm social history with patient and ETOH cessation. Follow-up repeat PETH.     Thank you for involving us in the care of Marely Hamilton. Please call with any additional concerns or questions.        Sarbjit Hollins MD, PGY-V  Gastroenterology Fellow  [facility]

## 2023-06-04 NOTE — ASSESSMENT & PLAN NOTE
58F with EtOH induced hepatic cirrhosis, seizure disorder on outpatient LEV and ZNS and bipolar disorder admitted on 5/28 for persistent encephalopathy.    - UTI on admit (Proteus mirabilis), now s/p CTX course  - Hypercalcemia 2/2 lithium toxicity (Lithium changed to Abilify per Psych)  - Hepatic encephalopathy 2/2 to medication non compliance, treated with lactulose and rifaximin    MRI Brain WO was unremarkable for acute neurologic change  NH4 48.  EEG w/ frequent left hemispheric electrographic seizures lasting on average 10-30 seconds with prolonged seizures over 1.5 hours also noted.     - Admit to [facility] for airway watch   - Q1h neurochecks while in ICU  - Q1h vitals while in ICU  - HOB@30  - Keppra 1500mg BID  - Zonisamide 150mg BID  - Vimpat 150 BID  - Thiamine  - MAP>65  - Daily CMP, Mag, Phos - replete electrolytes PRN  - Lipid panel, A1c, Coags reviewed  - Daily CBC, transfuse PRN  - Start SubQ Heparin in when clinically indicated   - PT/OT/SLP as appropriate  - CM/SW consult for dispo planning  06/02: per epilepsy team, no seizures, on keppra only which is 50/50 blood and kidney metabolized, hopefully mental status improves with clearance of ativan  06/03: no seizures overnight, D/C EEG, cont low dose keppra

## 2023-06-04 NOTE — ASSESSMENT & PLAN NOTE
Daily CMP  likly secondary to chronic liver disease and loss of oncotic pressure  Improved with free water administration

## 2023-06-04 NOTE — PROGRESS NOTES
[facility] Critical Care  Neurocritical Care  Progress Note    Admit Date: 5/28/2023  Service Date: 06/04/2023  Length of Stay: 7    Subjective:     Chief Complaint: Non-convulsive status epilepticus    History of Present Illness: Marely Hamilton is a 58 year old female with a medical history significant for EtOH induced hepatic cirrhosis, seizure disorder on outpatient LEV and ZNS and bipolar disorder who was [place] [facility] [place] as a transfer from [facility] for evaluation of persistent encephalopathy concerning for NCSE vs hepatic encephalopathy. History is obtained from chart as patient is unresponsive and unable to assist. Initially presented to [facility] on 5/18 for encephalopathy and thought to be multifactorial including UTI (Proteus mirabilis s/p CTX course), hypercalcemia 2/2 lithium toxicity (Lithium changed to Abilify per Psych), as well as hepatic encephalopathy secondary to medication non compliance. She was treated with lactulose and rifaximin with no improvement in her mental status. EEG showed generalized slowing as well as triphasic discharges in the left hemisphere. MRI Brain WO was unremarkable for acute neurologic change. Decision was made to transfer patient [place] [facility] [place] higher level of care and ongoing evaluation and management. On arrival, mental status exam has waxed and waned with patient being intermittently verbal and following commands. NH4 48.    NCC is consulted on hospital day 4 for admission after EEG showed frequent left hemispheric electrographic seizures lasting on average 10-30 seconds with prolonged seizures over 1.5 hours also noted. Per chart review, patient home dose of LEV increased from 1000mg to 1500mg BID, ZNS increased to 200mg. Vimpat 150mg BID added per General Neurology (had not been given prior to consult). On initial evaluation, patient is not responsive to voice or pain. Upward gaze noted. Decision made to transfer patient to [facility] for higher level of care and  airway watch.       Hospital Course: 06/02/2023 Tachycardic and hypotensive, transferred for development of mostly right hemispheric status, LOC exam remains poor  Intubated for airway protection, EEG has changed to mostly include triphasics with no definite seizure activity per Dr Boss, propofol stopped and AEDs simplified to keppra 1000mg bid only, wean off pressor, give colloids with crystalloid resuscitation  06/03/2023: remains on persistant significant pressor support despite adequate hydration, problems with third spacing and may have pulmonary hypertension as well, consider trial of stress steroids if hgb stabilizes  06/04/2023: levo down to 0.08mcg, start and advance TFs, vaso at 0.04U, ammonia has been stable, slight rise in tbili, check direct bili, hgb and plts improved, no clear source of bleeding, no source of bleeding on CT abdomen, consider superimposed hemolysis      Interval History:   Review of Systems   Unable to perform ROS: Intubated     Objective:     Vitals:  Temp: 96.8 °F (36 °C)  Pulse: (!) 58  Rhythm: normal sinus rhythm  BP: (!) 104/59  MAP (mmHg): 79  CI (L/min/m2): 3.2 L/min/m2  SVI: 50  SVV: 3 %  Resp: (!) 37  SpO2: 95 %  Oxygen Concentration (%): 50  Vent Mode: A/C  Set Rate: 14 BPM  Vt Set: 320 mL  PEEP/CPAP: 5 cmH20  Peak Airway Pressure: 17 cmH20  Mean Airway Pressure: 8.3 cmH20  Plateau Pressure: 0 cmH20    Temp  Min: 95.2 °F (35.1 °C)  Max: 98.1 °F (36.7 °C)  Pulse  Min: 54  Max: 98  BP  Min: 102/56  Max: 150/72  MAP (mmHg)  Min: 77  Max: 104  CI (L/min/m2)  Min: 2.9 L/min/m2  Max: 4 L/min/m2  SVI  Min: 42  Max: 61  SVV  Min: 3 %  Max: 8 %  Resp  Min: 13  Max: 37  SpO2  Min: 93 %  Max: 99 %  Oxygen Concentration (%)  Min: 50  Max: 60    06/03 0701 - 06/04 0700  In: 4934 [I.V.:1629.8]  Out: 3155 [Urine:1305]   Unmeasured Output  Urine Occurrence: 0  Stool Occurrence: 1  Emesis Occurrence: 0  Pad Count: 2        Physical Exam  Constitutional:       Comments: Opens eyes with light  noxious then maintains eye opening   HENT:      Head: Normocephalic.   Eyes:      Extraocular Movements: Extraocular movements intact.      Pupils: Pupils are equal, round, and reactive to light.   Cardiovascular:      Rate and Rhythm: Normal rate.   Pulmonary:      Breath sounds: Normal breath sounds.   Abdominal:      Palpations: Abdomen is soft.   Musculoskeletal:      Cervical back: Neck supple.   Skin:     General: Skin is warm.   Neurological:      Comments: Left side hemiparesis            Medications:  ContinuousNORepinephrine bitartrate-D5W, Last Rate: 0.08 mcg/kg/min (06/04/23 1302)  vasopressin, Last Rate: 0.04 Units/min (06/04/23 1302)    ScheduledARIPiprazole, 5 mg, Daily  cefTRIAXone (ROCEPHIN) IVPB, 2 g, Q24H  folic acid, 1 mg, Daily  hydrocortisone sodium succinate, 50 mg, Q8H  lactulose, 200 g, TID  levETIRAcetam (Keppra) IV (PEDS and ADULTS), 500 mg, Q12H  mupirocin, , BID  pantoprozole (PROTONIX) IV, 40 mg, Q12H  rifAXIMin, 550 mg, BID  sodium bicarbonate, 1,300 mg, BID  thiamine, 100 mg, Daily    PRNsodium chloride, , Q24H PRN  sodium chloride, , Q24H PRN  sodium chloride, , Q24H PRN  sodium chloride, , Q24H PRN  sodium chloride, , Q24H PRN  aluminum-magnesium hydroxide-simethicone, 30 mL, QID PRN  dextrose 10%, 12.5 g, PRN  dextrose 10%, 25 g, PRN  dextrose, 15 g, PRN  dextrose, 30 g, PRN  fentaNYL, 50 mcg, Q1H PRN  glucagon (human recombinant), 1 mg, PRN  insulin aspart U-100, 0-5 Units, QID (AC + HS) PRN  magnesium oxide, 800 mg, PRN  magnesium oxide, 800 mg, PRN  melatonin, 6 mg, Nightly PRN  naloxone, 0.02 mg, PRN  ondansetron, 4 mg, Q8H PRN  potassium bicarbonate, 35 mEq, PRN  potassium bicarbonate, 50 mEq, PRN  potassium bicarbonate, 60 mEq, PRN  potassium, sodium phosphates, 2 packet, PRN  potassium, sodium phosphates, 2 packet, PRN  potassium, sodium phosphates, 2 packet, PRN  simethicone, 1 tablet, QID PRN  sodium chloride 0.9%, 10 mL, Q8H PRN      Today I personally reviewed pertinent  medications, lines/drains/airways, imaging, laboratory results, microbiology results, notably:    Diet  Diet NPO  Diet NPO          Assessment/Plan:     Neuro  * Non-convulsive status epilepticus  58F with EtOH induced hepatic cirrhosis, seizure disorder on outpatient LEV and ZNS and bipolar disorder admitted on 5/28 for persistent encephalopathy.    - UTI on admit (Proteus mirabilis), now s/p CTX course  - Hypercalcemia 2/2 lithium toxicity (Lithium changed to Abilify per Psych)  - Hepatic encephalopathy 2/2 to medication non compliance, treated with lactulose and rifaximin    MRI Brain WO was unremarkable for acute neurologic change  NH4 48.  EEG w/ frequent left hemispheric electrographic seizures lasting on average 10-30 seconds with prolonged seizures over 1.5 hours also noted.     - Admit to [facility] for airway watch   - Q1h neurochecks while in ICU  - Q1h vitals while in ICU  - HOB@30  - Keppra 1500mg BID  - Zonisamide 150mg BID  - Vimpat 150 BID  - Thiamine  - MAP>65  - Daily CMP, Mag, Phos - replete electrolytes PRN  - Lipid panel, A1c, Coags reviewed  - Daily CBC, transfuse PRN  - Start SubQ Heparin in when clinically indicated   - PT/OT/SLP as appropriate  - CM/SW consult for dispo planning  06/02: per epilepsy team, no seizures, on keppra only which is 50/50 blood and kidney metabolized, hopefully mental status improves with clearance of ativan  06/03: no seizures overnight, D/C EEG, cont low dose keppra    Pulmonary  Acute hypoxemic respiratory failure  Airway watch  Intubated today for airway protection  Difficult intubation due to far anterior airway  06/04: definitely some pulmonary congestion but would hold diuresis with pressor requirment    Cardiac/Vascular  Hypotension (arterial)  Unclear etiology  First suggestion was decreased intravascular volume plus splanchnic dilataion  Responded partially to aggressive crystalloid and colloid hydration  Stress dose steroids added  No clear signs to suggest  sepsis at present      Renal/  Hypernatremia  Daily CMP  likly secondary to chronic liver disease and loss of oncotic pressure  Improved with free water administration    Hematology  Thrombocytopenia  Likely secondary to hepatic cirrhosis and development of splenomegaly  Has worsened and associated with hgb drop, transfuse to >50K  No signs of bleeding in GI tract or on CT, consider hemolysis    Endocrine  Hyperammonemia  See Non-convulsive status epilepticus  Controlled on present regimen    Appreciate hepatology help, lactulose suppositories          The patient is being Prophylaxed for:  Venous Thromboembolism with: Mechanical  Stress Ulcer with: PPI  Ventilator Pneumonia with: chlorhexidine oral care    Activity Orders          Diet NPO: NPO starting at 06/01 1055    Turn patient starting at 05/28 2253    Progressive Mobility Protocol (mobilize patient to their highest level of functioning at least twice daily) starting at 05/28 2000      CRC 38 min  Full Code    Rafi Veronica MD  Neurocritical Care  [facility]

## 2023-06-04 NOTE — TREATMENT PLAN
GI Treatment Plan    Marely Hamilton is a 58 y.o. female admitted to hospital 5/28/2023 (Hospital Day: 8) due to Non-convulsive status epilepticus.     Interval History  Still with brown stools  Hgb downtrending since yesterday 7.7 > 6.4  No blood with NG suction  Oropharyngeal bleeding has stopped  Pressor requirement downtrending    Objective  Temp:  [95.2 °F (35.1 °C)-99.7 °F (37.6 °C)] 97.5 °F (36.4 °C) (06/04 1128)  Pulse:  [] 63 (06/04 1128)  BP: (114-150)/(56-84) 138/68 (06/04 1001)  Resp:  [13-35] 13 (06/04 1128)  SpO2:  [93 %-99 %] 95 % (06/04 1128)  Arterial Line BP: (113-162)/(41-87) 121/57 (06/04 1102)    Constitutional:  intubated, sedated  HENT: Head: Normal, normocephalic, atraumatic.  Eyes: conjunctiva clear and sclera nonicteric  Cardiovascular: regular rate and rhythm and no murmur  Respiratory: mechanical breath sounds  GI: soft, non-tender, without masses or organomegaly  Musculoskeletal: no muscular tenderness noted  Skin: normal color  Neurological: sedated      Laboratory    Recent Labs   Lab 06/03/23  2133 06/04/23  0030 06/04/23  0355   HGB 6.9* 7.2* 6.4*         Assessment and Plan       Marely Hamilton is a 58 y.o. female with history of alcoholic cirrhosis, seizure disorder here with concern for status. GI consulted for drop in Hgb without overt GI bleeding. Noted some oropharyngeal bleeding after intubation but since stopped. No intraabdominal source on CT. Continue IV PPI while monitoring for overt GIB.     Problem List:  Anemia  Decompensated cirrhosis     Recommendations:  - No plans for endoscopy at this time unless overt GI bleeding develops  - Trend Hgb q8 hrs. Transfuse for Hgb <7, unless otherwise indicated  - Diet per primary team  - Hold all NSAIDs and anticoagulants, unless contraindicated  - Continue IV pantoprazole 40mg BID (ordered)  - Please correct any coagulopathy with platelets and FFP for goal of platelets >50K and INR <2.0  - Please notify GI team if there is  significant change in patient's clinical status    Thank you for involving us in the care of Marely Hamilton. Please call with any additional questions, concerns or changes in the patient's clinical status.    Roberto Faith MD  Gastroenterology Fellow, PGY IV

## 2023-06-04 NOTE — PROCEDURES
Procedures  EXTENDED  ELECTROENCEPHALOGRAM  REPORT    DATE OF SERVICE: 6/3/23-6/3/23  EEG NUMBER: [id number]  REQUESTED BY:  José Antonio  LOCATION OF SERVICE:  [facility] [place]    METHODOLOGY   Electroencephalographic (EEG) recording is with electrodes placed according to the International 10-20 placement system.  Thirty two (32) channels of digital signal (sampling rate of 512/sec) including T1 and T2 was simultaneously recorded from the scalp and may include  EKG, EMG, and/or eye monitors.  Recording band pass was 0.1 to 512 hz.  Digital video recording of the patient is simultaneously recorded with the EEG.  The patient is instructed report clinical symptoms which may occur during the recording session.  EEG and video recording is stored and archived in digital format.  Activation procedures which include photic stimulation, hyperventilation and instructing patients to perform simple task are done in selected patients.   The EEG is displayed on a monitor screen and can be reviewed using different montages.  Computer assisted analysis is employed to detect spike and electrographic seizure activity.   The entire record is submitted for computer analysis.  The entire recording is visually reviewed and the times identified by computer analysis as being spikes or seizures are reviewed again.  Compresses spectral analysis (CSA) is also performed on the activity recorded from each individual channel.  This is displayed as a power display of frequencies from 0 to 30 Hz over time.   The CSA is reviewed looking for asymmetries in power between homologous areas of the scalp and then compared with the original EEG recording.     [employer] software was also utilized in the review of this study.  This software suite analyzes the EEG recording in multiple domains.  Coherence and rhythmicity is computed to identify EEG sections which may contain organized seizures.  Each channel undergoes analysis to detect presence of spike and sharp waves which  have special and morphological characteristic of epileptic activity.  The routine EEG recording is converted from spacial into frequency domain.  This is then displayed comparing homologous areas to identify areas of significant asymmetry.  Algorithm to identify non-cortically generated artifact is used to separate eye movement, EMG and other artifact from the EEG.      RECORDING TIMES  Start on 6/3/23 at 07:01:06  Stop on 6/3/23 at 14:08:22    A total of 7 hrs of EEG recording was obtained.    EEG FINDINGS  The record shows a fair  organization at rest, consisting of a 6-7 Hz posterior dominant rhythm with fair  reactivity. There is mild bilateral beta activity. There is continuous diffuse thetea and delta range background slowing. There are rare brief runs of sharply contoured anterior predominant periodic theta/delta slowing with some anterior to posterior lag with a triphasic morphology    Drowsiness is characterized by attenuation of the background, vertex waves, and bilateral theta slowing. Sleep is characterized by vertex waves and poorly formed sleep spindles.    EKG recording shows a regular rhythm.    There are no patient events in this segment.    IMPRESSION:  Abnormal study due to moderate  diffuse background slowing consistent with diffuse cerebral dysfunction and encephalopathy which may be on the basis of toxic, metabolic, or primary neuronal disorder.  Triphasic waves may be seen in a variety of encephalopathies, including those due to renal, hepatic, anoxic, metabolic, or other toxic sources.    Last Chou MD

## 2023-06-04 NOTE — PLAN OF CARE
[facility] Care Plan    POC reviewed with Marely Hamilton and family via telephone. Sister verbalized understanding. Questions and concerns addressed. No acute events today. Pt progressing toward goals. Will continue to monitor. See below and flowsheets for full assessment and VS info.     -CT chest/abdomen/pelvis completed  -impact peptide 1.5 diet started; goal- 20  -complete bath given       Is this a stroke patient? no    Neuro:  [place] Coma Scale  Best Eye Response: 2-->(E2) to pain  Best Motor Response: 4-->(M4) withdraws from pain  Best Verbal Response: 1-->(V1) none  [place] Coma Scale Score: 7  Assessment Qualifiers: patient intubated  Pupil PERRLA: no     24 hr Temp:  [95.2 °F (35.1 °C)-97.5 °F (36.4 °C)]     CV:   Rhythm: normal sinus rhythm  BP goals:   SBP < 180  MAP > 65    Resp:      Vent Mode: A/C  Set Rate: 14 BPM  Oxygen Concentration (%): 50  Vt Set: 320 mL  PEEP/CPAP: 5 cmH20    Plan: wean to extubate and trach in place    GI/:     Diet/Nutrition Received: NPO, tube feeding  Last Bowel Movement: 06/04/23  Voiding Characteristics: urethral catheter (bladder)    Intake/Output Summary (Last 24 hours) at 6/4/2023 1833  Last data filed at 6/4/2023 1801  Gross per 24 hour   Intake 3613.76 ml   Output 2090 ml   Net 1523.76 ml     Unmeasured Output  Urine Occurrence: 0  Stool Occurrence: 1  Emesis Occurrence: 0  Pad Count: 2    Labs/Accuchecks:  Recent Labs   Lab 06/04/23  1706   WBC 5.46   RBC 2.42*   HGB 7.7*   HCT 23.6*   PLT 53*      Recent Labs   Lab 06/04/23  0355      K 3.3*   CO2 23   *   BUN 15   CREATININE 0.6   ALKPHOS 122   ALT 38   AST 43*   BILITOT 5.0*      Recent Labs   Lab 06/04/23  0808   INR 1.9*   APTT 38.7*    No results for input(s): CPK, CPKMB, TROPONINI, MB in the last 168 hours.    Electrolytes: No replacement orders  Accuchecks: ACHS    Gtts:   NORepinephrine bitartrate-D5W 0.06 mcg/kg/min (06/04/23 1801)    vasopressin 0.04 Units/min (06/04/23 1801)        LDA/Wounds:  Lines/Drains/Airways       Central Venous Catheter Line  Duration             Percutaneous Central Line Insertion/Assessment - Triple Lumen  06/03/23 1345 Femoral Right 1 day              Drain  Duration                  NG/OG Tube 06/01/23 2200 Right nostril 2 days         Urethral Catheter 06/01/23 2101 Temperature probe 2 days         Rectal Tube 06/03/23 1300 1 day              Airway  Duration                  Airway - Non-Surgical 06/02/23 1118 Endotracheal Tube 2 days              Arterial Line  Duration             Arterial Line 06/02/23 1050 Left Radial 2 days              Peripheral Intravenous Line  Duration                  Midline Catheter Insertion/Assessment  - Single Lumen 05/25/23 1005 Right basilic vein (medial side of arm) other (see comments) 10 days         Peripheral IV - Single Lumen 06/02/23 0030 20 G Anterior;Left Foot 2 days         Peripheral IV - Single Lumen 06/02/23 1130 18 G;1 3/4 in Left Upper Arm 2 days         Peripheral IV - Single Lumen 06/02/23 2200 20 G Anterior;Right Ankle 1 day                  Wounds: Yes  Wound care consulted: Yes

## 2023-06-04 NOTE — ASSESSMENT & PLAN NOTE
Airway watch  Intubated today for airway protection  Difficult intubation due to far anterior airway  06/04: definitely some pulmonary congestion but would hold diuresis with pressor requirment

## 2023-06-04 NOTE — SUBJECTIVE & OBJECTIVE
Interval History:   Review of Systems   Unable to perform ROS: Intubated     Objective:     Vitals:  Temp: 96.8 °F (36 °C)  Pulse: (!) 58  Rhythm: normal sinus rhythm  BP: (!) 104/59  MAP (mmHg): 79  CI (L/min/m2): 3.2 L/min/m2  SVI: 50  SVV: 3 %  Resp: (!) 37  SpO2: 95 %  Oxygen Concentration (%): 50  Vent Mode: A/C  Set Rate: 14 BPM  Vt Set: 320 mL  PEEP/CPAP: 5 cmH20  Peak Airway Pressure: 17 cmH20  Mean Airway Pressure: 8.3 cmH20  Plateau Pressure: 0 cmH20    Temp  Min: 95.2 °F (35.1 °C)  Max: 98.1 °F (36.7 °C)  Pulse  Min: 54  Max: 98  BP  Min: 102/56  Max: 150/72  MAP (mmHg)  Min: 77  Max: 104  CI (L/min/m2)  Min: 2.9 L/min/m2  Max: 4 L/min/m2  SVI  Min: 42  Max: 61  SVV  Min: 3 %  Max: 8 %  Resp  Min: 13  Max: 37  SpO2  Min: 93 %  Max: 99 %  Oxygen Concentration (%)  Min: 50  Max: 60    06/03 0701 - 06/04 0700  In: 4934 [I.V.:1629.8]  Out: 3155 [Urine:1305]   Unmeasured Output  Urine Occurrence: 0  Stool Occurrence: 1  Emesis Occurrence: 0  Pad Count: 2        Physical Exam  Constitutional:       Comments: Opens eyes with light noxious then maintains eye opening   HENT:      Head: Normocephalic.   Eyes:      Extraocular Movements: Extraocular movements intact.      Pupils: Pupils are equal, round, and reactive to light.   Cardiovascular:      Rate and Rhythm: Normal rate.   Pulmonary:      Breath sounds: Normal breath sounds.   Abdominal:      Palpations: Abdomen is soft.   Musculoskeletal:      Cervical back: Neck supple.   Skin:     General: Skin is warm.   Neurological:      Comments: Left side hemiparesis            Medications:  ContinuousNORepinephrine bitartrate-D5W, Last Rate: 0.08 mcg/kg/min (06/04/23 1302)  vasopressin, Last Rate: 0.04 Units/min (06/04/23 1302)    ScheduledARIPiprazole, 5 mg, Daily  cefTRIAXone (ROCEPHIN) IVPB, 2 g, Q24H  folic acid, 1 mg, Daily  hydrocortisone sodium succinate, 50 mg, Q8H  lactulose, 200 g, TID  levETIRAcetam (Keppra) IV (PEDS and ADULTS), 500 mg, Q12H  mupirocin,  , BID  pantoprozole (PROTONIX) IV, 40 mg, Q12H  rifAXIMin, 550 mg, BID  sodium bicarbonate, 1,300 mg, BID  thiamine, 100 mg, Daily    PRNsodium chloride, , Q24H PRN  sodium chloride, , Q24H PRN  sodium chloride, , Q24H PRN  sodium chloride, , Q24H PRN  sodium chloride, , Q24H PRN  aluminum-magnesium hydroxide-simethicone, 30 mL, QID PRN  dextrose 10%, 12.5 g, PRN  dextrose 10%, 25 g, PRN  dextrose, 15 g, PRN  dextrose, 30 g, PRN  fentaNYL, 50 mcg, Q1H PRN  glucagon (human recombinant), 1 mg, PRN  insulin aspart U-100, 0-5 Units, QID (AC + HS) PRN  magnesium oxide, 800 mg, PRN  magnesium oxide, 800 mg, PRN  melatonin, 6 mg, Nightly PRN  naloxone, 0.02 mg, PRN  ondansetron, 4 mg, Q8H PRN  potassium bicarbonate, 35 mEq, PRN  potassium bicarbonate, 50 mEq, PRN  potassium bicarbonate, 60 mEq, PRN  potassium, sodium phosphates, 2 packet, PRN  potassium, sodium phosphates, 2 packet, PRN  potassium, sodium phosphates, 2 packet, PRN  simethicone, 1 tablet, QID PRN  sodium chloride 0.9%, 10 mL, Q8H PRN      Today I personally reviewed pertinent medications, lines/drains/airways, imaging, laboratory results, microbiology results, notably:    Diet  Diet NPO  Diet NPO

## 2023-06-04 NOTE — ASSESSMENT & PLAN NOTE
Unclear etiology  First suggestion was decreased intravascular volume plus splanchnic dilataion  Responded partially to aggressive crystalloid and colloid hydration  Stress dose steroids added  No clear signs to suggest sepsis at present

## 2023-06-05 LAB
ALBUMIN SERPL BCP-MCNC: 2.9 G/DL (ref 3.5–5.2)
ALLENS TEST: ABNORMAL
ALP SERPL-CCNC: 127 U/L (ref 55–135)
ALT SERPL W/O P-5'-P-CCNC: 35 U/L (ref 10–44)
AMMONIA PLAS-SCNC: 55 UMOL/L (ref 10–50)
ANION GAP SERPL CALC-SCNC: 9 MMOL/L (ref 8–16)
ANISOCYTOSIS BLD QL SMEAR: SLIGHT
ANISOCYTOSIS BLD QL SMEAR: SLIGHT
APTT PPP: 41.8 SEC (ref 21–32)
APTT PPP: 44 SEC (ref 21–32)
AST SERPL-CCNC: 34 U/L (ref 10–40)
BASO STIPL BLD QL SMEAR: ABNORMAL
BASOPHILS # BLD AUTO: 0 K/UL (ref 0–0.2)
BASOPHILS # BLD AUTO: 0 K/UL (ref 0–0.2)
BASOPHILS # BLD AUTO: 0.01 K/UL (ref 0–0.2)
BASOPHILS NFR BLD: 0 % (ref 0–1.9)
BASOPHILS NFR BLD: 0 % (ref 0–1.9)
BASOPHILS NFR BLD: 0.2 % (ref 0–1.9)
BILIRUB SERPL-MCNC: 3.4 MG/DL (ref 0.1–1)
BUN SERPL-MCNC: 15 MG/DL (ref 6–20)
BURR CELLS BLD QL SMEAR: ABNORMAL
CALCIUM SERPL-MCNC: 7.3 MG/DL (ref 8.7–10.5)
CHLORIDE SERPL-SCNC: 113 MMOL/L (ref 95–110)
CO2 SERPL-SCNC: 21 MMOL/L (ref 23–29)
COPPER URINE COLLECTION DURATION: 24 H
COPPER URINE VOLUME: 800 ML
COPPER, 24 HOUR URINE: 13 MCG/24 H (ref 9–71)
CREAT SERPL-MCNC: 0.6 MG/DL (ref 0.5–1.4)
DELSYS: ABNORMAL
DIFFERENTIAL METHOD: ABNORMAL
EOSINOPHIL # BLD AUTO: 0 K/UL (ref 0–0.5)
EOSINOPHIL NFR BLD: 0 % (ref 0–8)
EOSINOPHIL NFR BLD: 0 % (ref 0–8)
EOSINOPHIL NFR BLD: 0.2 % (ref 0–8)
ERYTHROCYTE [DISTWIDTH] IN BLOOD BY AUTOMATED COUNT: 23.6 % (ref 11.5–14.5)
ERYTHROCYTE [DISTWIDTH] IN BLOOD BY AUTOMATED COUNT: 23.7 % (ref 11.5–14.5)
ERYTHROCYTE [DISTWIDTH] IN BLOOD BY AUTOMATED COUNT: 24.3 % (ref 11.5–14.5)
ERYTHROCYTE [SEDIMENTATION RATE] IN BLOOD BY WESTERGREN METHOD: 14 MM/H
EST. GFR  (NO RACE VARIABLE): >60 ML/MIN/1.73 M^2
FIBRINOGEN PPP-MCNC: 146 MG/DL (ref 182–400)
FIBRINOGEN PPP-MCNC: 178 MG/DL (ref 182–400)
FIO2: 50
GLUCOSE SERPL-MCNC: 209 MG/DL (ref 70–110)
HAPTOGLOB SERPL-MCNC: 11 MG/DL (ref 30–250)
HCO3 UR-SCNC: 22 MMOL/L (ref 24–28)
HCT VFR BLD AUTO: 23.2 % (ref 37–48.5)
HCT VFR BLD AUTO: 24.8 % (ref 37–48.5)
HCT VFR BLD AUTO: 24.8 % (ref 37–48.5)
HGB BLD-MCNC: 7.5 G/DL (ref 12–16)
HGB BLD-MCNC: 7.8 G/DL (ref 12–16)
HGB BLD-MCNC: 7.8 G/DL (ref 12–16)
HYPOCHROMIA BLD QL SMEAR: ABNORMAL
IMM GRANULOCYTES # BLD AUTO: 0.03 K/UL (ref 0–0.04)
IMM GRANULOCYTES NFR BLD AUTO: 0.5 % (ref 0–0.5)
IMM GRANULOCYTES NFR BLD AUTO: 0.6 % (ref 0–0.5)
IMM GRANULOCYTES NFR BLD AUTO: 0.6 % (ref 0–0.5)
INR PPP: 2 (ref 0.8–1.2)
LDH SERPL L TO P-CCNC: 249 U/L (ref 110–260)
LYMPHOCYTES # BLD AUTO: 0.4 K/UL (ref 1–4.8)
LYMPHOCYTES # BLD AUTO: 0.5 K/UL (ref 1–4.8)
LYMPHOCYTES # BLD AUTO: 0.5 K/UL (ref 1–4.8)
LYMPHOCYTES NFR BLD: 7.2 % (ref 18–48)
LYMPHOCYTES NFR BLD: 9 % (ref 18–48)
LYMPHOCYTES NFR BLD: 9.2 % (ref 18–48)
MAGNESIUM SERPL-MCNC: 1.9 MG/DL (ref 1.6–2.6)
MCH RBC QN AUTO: 30.7 PG (ref 27–31)
MCH RBC QN AUTO: 31.3 PG (ref 27–31)
MCH RBC QN AUTO: 31.6 PG (ref 27–31)
MCHC RBC AUTO-ENTMCNC: 31.5 G/DL (ref 32–36)
MCHC RBC AUTO-ENTMCNC: 31.5 G/DL (ref 32–36)
MCHC RBC AUTO-ENTMCNC: 32.3 G/DL (ref 32–36)
MCV RBC AUTO: 100 FL (ref 82–98)
MCV RBC AUTO: 97 FL (ref 82–98)
MCV RBC AUTO: 98 FL (ref 82–98)
MODE: ABNORMAL
MONOCYTES # BLD AUTO: 0.1 K/UL (ref 0.3–1)
MONOCYTES # BLD AUTO: 0.2 K/UL (ref 0.3–1)
MONOCYTES # BLD AUTO: 0.3 K/UL (ref 0.3–1)
MONOCYTES NFR BLD: 2.8 % (ref 4–15)
MONOCYTES NFR BLD: 3.9 % (ref 4–15)
MONOCYTES NFR BLD: 4.9 % (ref 4–15)
NEUTROPHILS # BLD AUTO: 4.4 K/UL (ref 1.8–7.7)
NEUTROPHILS # BLD AUTO: 4.5 K/UL (ref 1.8–7.7)
NEUTROPHILS # BLD AUTO: 4.8 K/UL (ref 1.8–7.7)
NEUTROPHILS NFR BLD: 85.4 % (ref 38–73)
NEUTROPHILS NFR BLD: 86.1 % (ref 38–73)
NEUTROPHILS NFR BLD: 89.4 % (ref 38–73)
NRBC BLD-RTO: 0 /100 WBC
OVALOCYTES BLD QL SMEAR: ABNORMAL
PCO2 BLDA: 35.8 MMHG (ref 35–45)
PEEP: 5
PH SMN: 7.4 [PH] (ref 7.35–7.45)
PHOSPHATE SERPL-MCNC: 2.1 MG/DL (ref 2.7–4.5)
PIP: 17
PLATELET # BLD AUTO: 50 K/UL (ref 150–450)
PLATELET # BLD AUTO: 57 K/UL (ref 150–450)
PLATELET # BLD AUTO: 81 K/UL (ref 150–450)
PLATELET BLD QL SMEAR: ABNORMAL
PLATELET BLD QL SMEAR: ABNORMAL
PMV BLD AUTO: 10.8 FL (ref 9.2–12.9)
PMV BLD AUTO: 11.4 FL (ref 9.2–12.9)
PMV BLD AUTO: 11.6 FL (ref 9.2–12.9)
PO2 BLDA: 80 MMHG (ref 80–100)
POC BE: -3 MMOL/L
POC SATURATED O2: 96 % (ref 95–100)
POC TCO2: 23 MMOL/L (ref 23–27)
POCT GLUCOSE: 169 MG/DL (ref 70–110)
POCT GLUCOSE: 189 MG/DL (ref 70–110)
POCT GLUCOSE: 218 MG/DL (ref 70–110)
POIKILOCYTOSIS BLD QL SMEAR: SLIGHT
POIKILOCYTOSIS BLD QL SMEAR: SLIGHT
POLYCHROMASIA BLD QL SMEAR: ABNORMAL
POLYCHROMASIA BLD QL SMEAR: ABNORMAL
POTASSIUM SERPL-SCNC: 3.8 MMOL/L (ref 3.5–5.1)
PROT SERPL-MCNC: 4.7 G/DL (ref 6–8.4)
PROTHROMBIN TIME: 20.3 SEC (ref 9–12.5)
RBC # BLD AUTO: 2.4 M/UL (ref 4–5.4)
RBC # BLD AUTO: 2.47 M/UL (ref 4–5.4)
RBC # BLD AUTO: 2.54 M/UL (ref 4–5.4)
RETICS/RBC NFR AUTO: 3.9 % (ref 0.5–2.5)
SAMPLE: ABNORMAL
SITE: ABNORMAL
SODIUM SERPL-SCNC: 143 MMOL/L (ref 136–145)
SP02: 100
SPHEROCYTES BLD QL SMEAR: ABNORMAL
TOXIC GRANULES BLD QL SMEAR: PRESENT
VT: 320
WBC # BLD AUTO: 5 K/UL (ref 3.9–12.7)
WBC # BLD AUTO: 5.13 K/UL (ref 3.9–12.7)
WBC # BLD AUTO: 5.67 K/UL (ref 3.9–12.7)

## 2023-06-05 PROCEDURE — 25000003 PHARM REV CODE 250: Performed by: NURSE PRACTITIONER

## 2023-06-05 PROCEDURE — 99291 PR CRITICAL CARE, E/M 30-74 MINUTES: ICD-10-PCS | Mod: GC,,, | Performed by: PSYCHIATRY & NEUROLOGY

## 2023-06-05 PROCEDURE — 63600175 PHARM REV CODE 636 W HCPCS: Performed by: PSYCHIATRY & NEUROLOGY

## 2023-06-05 PROCEDURE — 85025 COMPLETE CBC W/AUTO DIFF WBC: CPT | Mod: 91 | Performed by: PSYCHIATRY & NEUROLOGY

## 2023-06-05 PROCEDURE — 25000003 PHARM REV CODE 250

## 2023-06-05 PROCEDURE — 99900026 HC AIRWAY MAINTENANCE (STAT)

## 2023-06-05 PROCEDURE — 63600175 PHARM REV CODE 636 W HCPCS: Performed by: NURSE PRACTITIONER

## 2023-06-05 PROCEDURE — 99900035 HC TECH TIME PER 15 MIN (STAT)

## 2023-06-05 PROCEDURE — 85045 AUTOMATED RETICULOCYTE COUNT: CPT | Performed by: STUDENT IN AN ORGANIZED HEALTH CARE EDUCATION/TRAINING PROGRAM

## 2023-06-05 PROCEDURE — 94761 N-INVAS EAR/PLS OXIMETRY MLT: CPT

## 2023-06-05 PROCEDURE — 85730 THROMBOPLASTIN TIME PARTIAL: CPT | Mod: 91 | Performed by: PSYCHIATRY & NEUROLOGY

## 2023-06-05 PROCEDURE — 83735 ASSAY OF MAGNESIUM: CPT | Performed by: PSYCHIATRY & NEUROLOGY

## 2023-06-05 PROCEDURE — 27000221 HC OXYGEN, UP TO 24 HOURS

## 2023-06-05 PROCEDURE — C9113 INJ PANTOPRAZOLE SODIUM, VIA: HCPCS | Performed by: STUDENT IN AN ORGANIZED HEALTH CARE EDUCATION/TRAINING PROGRAM

## 2023-06-05 PROCEDURE — 82140 ASSAY OF AMMONIA: CPT | Performed by: NURSE PRACTITIONER

## 2023-06-05 PROCEDURE — 85610 PROTHROMBIN TIME: CPT | Performed by: NURSE PRACTITIONER

## 2023-06-05 PROCEDURE — 94003 VENT MGMT INPAT SUBQ DAY: CPT

## 2023-06-05 PROCEDURE — 25000003 PHARM REV CODE 250: Performed by: PSYCHIATRY & NEUROLOGY

## 2023-06-05 PROCEDURE — 25000003 PHARM REV CODE 250: Performed by: STUDENT IN AN ORGANIZED HEALTH CARE EDUCATION/TRAINING PROGRAM

## 2023-06-05 PROCEDURE — 83615 LACTATE (LD) (LDH) ENZYME: CPT | Performed by: STUDENT IN AN ORGANIZED HEALTH CARE EDUCATION/TRAINING PROGRAM

## 2023-06-05 PROCEDURE — 84100 ASSAY OF PHOSPHORUS: CPT | Performed by: PSYCHIATRY & NEUROLOGY

## 2023-06-05 PROCEDURE — 27200966 HC CLOSED SUCTION SYSTEM

## 2023-06-05 PROCEDURE — 63600175 PHARM REV CODE 636 W HCPCS: Performed by: STUDENT IN AN ORGANIZED HEALTH CARE EDUCATION/TRAINING PROGRAM

## 2023-06-05 PROCEDURE — 80053 COMPREHEN METABOLIC PANEL: CPT | Performed by: PSYCHIATRY & NEUROLOGY

## 2023-06-05 PROCEDURE — 99232 PR SUBSEQUENT HOSPITAL CARE,LEVL II: ICD-10-PCS | Mod: ,,,

## 2023-06-05 PROCEDURE — 83010 ASSAY OF HAPTOGLOBIN QUANT: CPT | Performed by: STUDENT IN AN ORGANIZED HEALTH CARE EDUCATION/TRAINING PROGRAM

## 2023-06-05 PROCEDURE — 99291 CRITICAL CARE FIRST HOUR: CPT | Mod: GC,,, | Performed by: PSYCHIATRY & NEUROLOGY

## 2023-06-05 PROCEDURE — 85384 FIBRINOGEN ACTIVITY: CPT | Performed by: PSYCHIATRY & NEUROLOGY

## 2023-06-05 PROCEDURE — 99232 SBSQ HOSP IP/OBS MODERATE 35: CPT | Mod: ,,,

## 2023-06-05 PROCEDURE — 20000000 HC ICU ROOM

## 2023-06-05 RX ORDER — GLUCAGON 1 MG
1 KIT INJECTION
Status: DISCONTINUED | OUTPATIENT
Start: 2023-06-05 | End: 2023-06-20

## 2023-06-05 RX ORDER — PANTOPRAZOLE SODIUM 40 MG/10ML
40 INJECTION, POWDER, LYOPHILIZED, FOR SOLUTION INTRAVENOUS 2 TIMES DAILY
Status: DISCONTINUED | OUTPATIENT
Start: 2023-06-05 | End: 2023-06-22

## 2023-06-05 RX ORDER — INSULIN ASPART 100 [IU]/ML
1-10 INJECTION, SOLUTION INTRAVENOUS; SUBCUTANEOUS EVERY 6 HOURS PRN
Status: DISCONTINUED | OUTPATIENT
Start: 2023-06-05 | End: 2023-06-20

## 2023-06-05 RX ADMIN — FENTANYL CITRATE 50 MCG: 0.05 INJECTION, SOLUTION INTRAMUSCULAR; INTRAVENOUS at 02:06

## 2023-06-05 RX ADMIN — FENTANYL CITRATE 50 MCG: 0.05 INJECTION, SOLUTION INTRAMUSCULAR; INTRAVENOUS at 01:06

## 2023-06-05 RX ADMIN — LACTULOSE 200 G: 10 SOLUTION ORAL at 10:06

## 2023-06-05 RX ADMIN — LEVETIRACETAM 500 MG: 100 INJECTION, SOLUTION INTRAVENOUS at 08:06

## 2023-06-05 RX ADMIN — LACTULOSE 200 G: 10 SOLUTION ORAL at 09:06

## 2023-06-05 RX ADMIN — FENTANYL CITRATE 50 MCG: 0.05 INJECTION, SOLUTION INTRAMUSCULAR; INTRAVENOUS at 09:06

## 2023-06-05 RX ADMIN — HYDROCORTISONE SODIUM SUCCINATE 50 MG: 100 INJECTION, POWDER, FOR SOLUTION INTRAMUSCULAR; INTRAVENOUS at 06:06

## 2023-06-05 RX ADMIN — SODIUM BICARBONATE 1300 MG: 650 TABLET ORAL at 09:06

## 2023-06-05 RX ADMIN — MUPIROCIN: 20 OINTMENT TOPICAL at 10:06

## 2023-06-05 RX ADMIN — PANTOPRAZOLE SODIUM 40 MG: 40 INJECTION, POWDER, FOR SOLUTION INTRAVENOUS at 08:06

## 2023-06-05 RX ADMIN — INSULIN ASPART 2 UNITS: 100 INJECTION, SOLUTION INTRAVENOUS; SUBCUTANEOUS at 06:06

## 2023-06-05 RX ADMIN — LACTULOSE 200 G: 10 SOLUTION ORAL at 02:06

## 2023-06-05 RX ADMIN — SODIUM BICARBONATE 1300 MG: 650 TABLET ORAL at 08:06

## 2023-06-05 RX ADMIN — RIFAXIMIN 550 MG: 550 TABLET ORAL at 09:06

## 2023-06-05 RX ADMIN — FENTANYL CITRATE 50 MCG: 0.05 INJECTION, SOLUTION INTRAMUSCULAR; INTRAVENOUS at 12:06

## 2023-06-05 RX ADMIN — RIFAXIMIN 550 MG: 550 TABLET ORAL at 08:06

## 2023-06-05 RX ADMIN — CEFTRIAXONE 2 G: 2 INJECTION, POWDER, FOR SOLUTION INTRAMUSCULAR; INTRAVENOUS at 01:06

## 2023-06-05 RX ADMIN — FOLIC ACID 1 MG: 1 TABLET ORAL at 08:06

## 2023-06-05 RX ADMIN — MUPIROCIN: 20 OINTMENT TOPICAL at 09:06

## 2023-06-05 RX ADMIN — Medication 100 MG: at 08:06

## 2023-06-05 RX ADMIN — FENTANYL CITRATE 50 MCG: 0.05 INJECTION, SOLUTION INTRAMUSCULAR; INTRAVENOUS at 06:06

## 2023-06-05 RX ADMIN — VASOPRESSIN 0.04 UNITS/MIN: 20 INJECTION INTRAVENOUS at 12:06

## 2023-06-05 RX ADMIN — ARIPIPRAZOLE 5 MG: 5 TABLET ORAL at 08:06

## 2023-06-05 RX ADMIN — NOREPINEPHRINE BITARTRATE 0.04 MCG/KG/MIN: 8 INJECTION, SOLUTION INTRAVENOUS at 06:06

## 2023-06-05 NOTE — PROGRESS NOTES
[facility]  Neurocritical Care  Progress Note    Admit Date: 5/28/2023  Service Date: 06/05/2023  Length of Stay: 8    Subjective:     Chief Complaint: Non-convulsive status epilepticus    History of Present Illness: Marely Hamilton is a 58 year old female with a medical history significant for EtOH induced hepatic cirrhosis, seizure disorder on outpatient LEV and ZNS and bipolar disorder who was admitted to [facility] [place] on 5/28 as a transfer from OSH for evaluation of persistent encephalopathy concerning for NCSE vs hepatic encephalopathy. History is obtained from chart as patient is unresponsive and unable to assist. Initially presented to [facility] on 5/18 for encephalopathy and thought to be multifactorial including UTI (Proteus mirabilis s/p CTX course), hypercalcemia 2/2 lithium toxicity (Lithium changed to Abilify per Psych), as well as hepatic encephalopathy secondary to medication non compliance. She was treated with lactulose and rifaximin with no improvement in her mental status. EEG showed generalized slowing as well as triphasic discharges in the left hemisphere. MRI Brain WO was unremarkable for acute neurologic change. Decision was made to transfer patient to [facility] [place] for higher level of care and ongoing evaluation and management. On arrival, mental status exam has waxed and waned with patient being intermittently verbal and following commands. NH4 48.    NCC is consulted on hospital day 4 for admission after EEG showed frequent left hemispheric electrographic seizures lasting on average 10-30 seconds with prolonged seizures over 1.5 hours also noted. Per chart review, patient home dose of LEV increased from 1000mg to 1500mg BID, ZNS increased to 200mg. Vimpat 150mg BID added per General Neurology (had not been given prior to consult). On initial evaluation, patient is not responsive to voice or pain. Upward gaze noted. Decision made to transfer patient to [facility] for higher level of care and  airway watch.       Hospital Course: 06/02/2023 Tachycardic and hypotensive, transferred for development of mostly right hemispheric status, LOC exam remains poor  Intubated for airway protection, EEG has changed to mostly include triphasics with no definite seizure activity per Dr Boss, propofol stopped and AEDs simplified to keppra 1000mg bid only, wean off pressor, give colloids with crystalloid resuscitation  06/03/2023: remains on persistant significant pressor support despite adequate hydration, problems with third spacing and may have pulmonary hypertension as well, consider trial of stress steroids if hgb stabilizes  06/04/2023: levo down to 0.08mcg, start and advance TFs, vaso at 0.04U, ammonia has been stable, slight rise in tbili, check direct bili, hgb and plts improved, no clear source of bleeding, no source of bleeding on CT abdomen, consider superimposed hemolysis  06/05/2023 NAEON. Neurologic exam continues to improve, following commands in all extremities. Levo 0.02, weaning as able. Vaso discontinued, MAP>65. Daily SBT, monitor and hold TF at midnight for possible extubation tomorrow. Hemolysis work up ongoing, trending CBC. Continue CTX for SBP prophylaxis.       Interval History: As above.     Review of Systems   Unable to perform ROS: Intubated     Objective:     Vitals:  Temp: (!) 95.9 °F (35.5 °C)  Pulse: (!) 59  Rhythm: normal sinus rhythm  BP: 119/62  MAP (mmHg): 84  CI (L/min/m2): 3.2 L/min/m2  SVI: 51  SVV: 5 %  Resp: 15  SpO2: 98 %  Oxygen Concentration (%): 50  Vent Mode: A/C  Set Rate: 14 BPM  Vt Set: 320 mL  PEEP/CPAP: 5 cmH20  Peak Airway Pressure: 15 cmH20  Mean Airway Pressure: 9.6 cmH20  Plateau Pressure: 0 cmH20    Temp  Min: 95.2 °F (35.1 °C)  Max: 97.5 °F (36.4 °C)  Pulse  Min: 55  Max: 76  BP  Min: 98/56  Max: 150/66  MAP (mmHg)  Min: 72  Max: 96  CI (L/min/m2)  Min: 3 L/min/m2  Max: 3.7 L/min/m2  SVI  Min: 49  Max: 61  SVV  Min: 3 %  Max: 6 %  Resp  Min: 11  Max: 40  SpO2   Min: 93 %  Max: 100 %  Oxygen Concentration (%)  Min: 50  Max: 50    06/04 0701 - 06/05 0700  In: 2792.4 [I.V.:704.6]  Out: 2892 [Urine:1142]   Unmeasured Output  Urine Occurrence: 0  Stool Occurrence: 1  Emesis Occurrence: 0  Pad Count: 2        Physical Exam  Vitals and nursing note reviewed.   Constitutional:       Comments: Opens eye spontaneously, following commands   HENT:      Head: Normocephalic and atraumatic.   Eyes:      Extraocular Movements: Extraocular movements intact.      Pupils: Pupils are equal, round, and reactive to light.   Cardiovascular:      Rate and Rhythm: Normal rate.   Pulmonary:      Comments: Pending   Abdominal:      Palpations: Abdomen is soft.   Musculoskeletal:      Cervical back: Neck supple.   Skin:     General: Skin is warm.   Neurological:      Mental Status: She is alert.      Comments:   Alert, follows commands in all extremities  Intubated, unable to test orientation  Pupils equal, reactive  Face grossly symmetric   Moves all extremities antigravity and to commands          Medications:  ContinuousNORepinephrine bitartrate-D5W, Last Rate: 0.03 mcg/kg/min (06/05/23 0805)  vasopressin, Last Rate: 0.04 Units/min (06/05/23 0805)    ScheduledARIPiprazole, 5 mg, Daily  cefTRIAXone (ROCEPHIN) IVPB, 2 g, Q24H  folic acid, 1 mg, Daily  lactulose, 200 g, TID  levETIRAcetam (Keppra) IV (PEDS and ADULTS), 500 mg, Q12H  mupirocin, , BID  pantoprazole, 40 mg, BID  rifAXIMin, 550 mg, BID  sodium bicarbonate, 1,300 mg, BID  thiamine, 100 mg, Daily    PRNsodium chloride, , Q24H PRN  aluminum-magnesium hydroxide-simethicone, 30 mL, QID PRN  dextrose 10%, 12.5 g, PRN  dextrose 10%, 25 g, PRN  dextrose, 15 g, PRN  dextrose, 30 g, PRN  fentaNYL, 50 mcg, Q1H PRN  glucagon (human recombinant), 1 mg, PRN  insulin aspart U-100, 0-5 Units, QID (AC + HS) PRN  magnesium oxide, 800 mg, PRN  magnesium oxide, 800 mg, PRN  melatonin, 6 mg, Nightly PRN  naloxone, 0.02 mg, PRN  ondansetron, 4 mg, Q8H  PRN  potassium bicarbonate, 35 mEq, PRN  potassium bicarbonate, 50 mEq, PRN  potassium bicarbonate, 60 mEq, PRN  potassium, sodium phosphates, 2 packet, PRN  potassium, sodium phosphates, 2 packet, PRN  potassium, sodium phosphates, 2 packet, PRN  simethicone, 1 tablet, QID PRN  sodium chloride 0.9%, 10 mL, Q8H PRN      Today I personally reviewed pertinent medications, lines/drains/airways, imaging, laboratory results, microbiology results, notably:    CBC, CMP, Mg, Phos, ABG    Diet  Diet NPO  Diet NPO          Assessment/Plan:     Neuro  * Non-convulsive status epilepticus  58F with EtOH induced hepatic cirrhosis, seizure disorder on outpatient LEV and ZNS and bipolar disorder admitted on 5/28 for persistent encephalopathy.    - UTI on admit (Proteus mirabilis), now s/p CTX course  - Hypercalcemia 2/2 lithium toxicity (Lithium changed to Abilify per Psych)  - Hepatic encephalopathy 2/2 to medication non compliance, treated with lactulose and rifaximin    MRI Brain WO was unremarkable for acute neurologic change  NH4 48.  EEG w/ frequent left hemispheric electrographic seizures lasting on average 10-30 seconds with prolonged seizures over 1.5 hours also noted.   Repeat EEGs without seizure activity x 24 hours, Dc    - Admit to NCC for airway watch   - Q1h neurochecks while in ICU  - Q1h vitals while in ICU  - HOB@30  - Keppra 500mg BID  - Thiamine  - MAP>65  - Daily CMP, Mag, Phos - replete electrolytes PRN  - Lipid panel, A1c, Coags reviewed  - Daily CBC, transfuse PRN  - Start SubQ Heparin in when clinically indicated, currently held due concern for GI bleeding   - PT/OT/SLP as appropriate  - CM/SW consult for dispo planning    Acute encephalopathy  Multifactorial   See Non-convulsive status epilepticus    Breakthrough seizure  See Non-convulsive status epilepticus    Psychiatric  Bipolar 1 disorder  See Non-convulsive status epilepticus    Alcohol abuse, in remission  See Non-convulsive status  epilepticus    Pulmonary  Acute hypoxemic respiratory failure  Intubated today for airway protection  Difficult intubation due to far anterior airway    Cardiac/Vascular  Hypotension (arterial)  Unclear etiology  First suggestion was decreased intravascular volume plus splanchnic dilataion  Responded partially to aggressive crystalloid and colloid hydration  S/p stress dose steroids   No clear signs to suggest sepsis at present  Requires pressor support, weaning as tolerated     Renal/  Hypernatremia  Daily CMP  Improved with free water administration, RESOLVED     Hematology  Thrombocytopenia  Likely secondary to hepatic cirrhosis and development of splenomegaly  Has worsened and associated with hgb drop, transfuse to >50K  No signs of bleeding in GI tract or on CT, consider hemolysis    Oncology  Anemia  Chronic  Hemolysis work up pending  Trend CBC    Macrocytic anemia  Daily CBC    Endocrine  Hyperammonemia  Stable on current medical regimen   See Non-convulsive status epilepticus      GI  Acute liver failure without hepatic coma  GI/Hepatology following    Hepatic cirrhosis  Continue Lactulose and Rifaximin   Follow coags and fibrinogen q12 and cbc q6  SBP prophylaxis with ceftriaxone        Hepatic encephalopathy  Continue lactulose   See Non-convulsive status epilepticus          The patient is being Prophylaxed for:  Venous Thromboembolism with: Mechanical  Stress Ulcer with: PPI  Ventilator Pneumonia with: chlorhexidine oral care    Activity Orders          Diet NPO: NPO starting at 06/01 1055    Turn patient starting at 05/28 2253    Progressive Mobility Protocol (mobilize patient to their highest level of functioning at least twice daily) starting at 05/28 2000        Full Code    Cameron Yadav MD  Neurocritical Care  [facility]

## 2023-06-05 NOTE — SUBJECTIVE & OBJECTIVE
Subjective:     Interval History: Followed up with patient for overt signs of GI bleeding. Counts have been stable. Last transfusion on 6/4. BMs light brown in color. OG tube eith tube feeds infusing. No blood noted in oropharynx when suctioning. Currently on low dose levophed and vasopressin with adequate blood pressures.     Review of Systems   Unable to perform ROS: Intubated   Objective:     Vital Signs (Most Recent):  Temp: (!) 95.9 °F (35.5 °C) (06/05/23 0811)  Pulse: (!) 59 (06/05/23 0811)  Resp: 15 (06/05/23 0811)  BP: 119/62 (06/05/23 0805)  SpO2: 98 % (06/05/23 0811) Vital Signs (24h Range):  Temp:  [95.2 °F (35.1 °C)-97.5 °F (36.4 °C)] 95.9 °F (35.5 °C)  Pulse:  [55-76] 59  Resp:  [11-40] 15  SpO2:  [93 %-100 %] 98 %  BP: ()/(55-69) 119/62  Arterial Line BP: ()/(41-65) 120/59     Weight: 59.9 kg (132 lb) (06/03/23 1800)  Body mass index is 24.14 kg/m².      Intake/Output Summary (Last 24 hours) at 6/5/2023 0826  Last data filed at 6/5/2023 0805  Gross per 24 hour   Intake 2417.13 ml   Output 2852 ml   Net -434.87 ml       Lines/Drains/Airways       Central Venous Catheter Line  Duration             Percutaneous Central Line Insertion/Assessment - Triple Lumen  06/03/23 1345 Femoral Right 1 day              Drain  Duration                  NG/OG Tube 06/01/23 2200 Right nostril 3 days         Urethral Catheter 06/01/23 2101 Temperature probe 3 days         Rectal Tube 06/03/23 1300 1 day              Airway  Duration                  Airway - Non-Surgical 06/02/23 1118 Endotracheal Tube 2 days              Arterial Line  Duration             Arterial Line 06/02/23 1050 Left Radial 2 days              Peripheral Intravenous Line  Duration                  Midline Catheter Insertion/Assessment  - Single Lumen 05/25/23 1005 Right basilic vein (medial side of arm) other (see comments) 10 days         Peripheral IV - Single Lumen 06/02/23 0030 20 G Anterior;Left Foot 3 days         Peripheral IV -  Single Lumen 06/02/23 2200 20 G Anterior;Right Ankle 2 days                     Physical Exam  Vitals and nursing note reviewed.   Constitutional:       Appearance: She is ill-appearing.   HENT:      Mouth/Throat:      Mouth: Mucous membranes are moist.      Pharynx: Oropharynx is clear.   Eyes:      General: Scleral icterus present.   Abdominal:      General: Abdomen is flat. Bowel sounds are normal. There is no distension.      Palpations: Abdomen is soft. There is no mass.      Tenderness: There is no abdominal tenderness.      Hernia: No hernia is present.   Skin:     General: Skin is warm and dry.      Capillary Refill: Capillary refill takes less than 2 seconds.      Coloration: Skin is jaundiced. Skin is not pale.      Findings: No bruising or erythema.   Neurological:      Comments: sedated        Significant Labs:  CBC:   Recent Labs   Lab 06/04/23  1132 06/04/23  1706 06/05/23  0037   WBC 6.26 5.46 5.00   HGB 7.3* 7.7* 7.8*   HCT 23.1* 23.6* 24.8*   PLT 69* 53* 57*     CMP:   Recent Labs   Lab 06/05/23  0037   *   CALCIUM 7.3*   ALBUMIN 2.9*   PROT 4.7*      K 3.8   CO2 21*   *   BUN 15   CREATININE 0.6   ALKPHOS 127   ALT 35   AST 34   BILITOT 3.4*     Coagulation:   Recent Labs   Lab 06/05/23  0037 06/05/23  0748   INR 2.0*  --    APTT  --  41.8*         Significant Imaging:  Imaging results within the past 24 hours have been reviewed.

## 2023-06-05 NOTE — ASSESSMENT & PLAN NOTE
See Non-convulsive status epilepticus   Vancomycin consult follow-up:    Patient reviewed, renal function stable, no new levels, continue current therapy; Next levels due: trough due 3/21/2023 at 0200

## 2023-06-05 NOTE — SUBJECTIVE & OBJECTIVE
Interval History: As above.     Review of Systems   Unable to perform ROS: Intubated     Objective:     Vitals:  Temp: (!) 95.9 °F (35.5 °C)  Pulse: (!) 59  Rhythm: normal sinus rhythm  BP: 119/62  MAP (mmHg): 84  CI (L/min/m2): 3.2 L/min/m2  SVI: 51  SVV: 5 %  Resp: 15  SpO2: 98 %  Oxygen Concentration (%): 50  Vent Mode: A/C  Set Rate: 14 BPM  Vt Set: 320 mL  PEEP/CPAP: 5 cmH20  Peak Airway Pressure: 15 cmH20  Mean Airway Pressure: 9.6 cmH20  Plateau Pressure: 0 cmH20    Temp  Min: 95.2 °F (35.1 °C)  Max: 97.5 °F (36.4 °C)  Pulse  Min: 55  Max: 76  BP  Min: 98/56  Max: 150/66  MAP (mmHg)  Min: 72  Max: 96  CI (L/min/m2)  Min: 3 L/min/m2  Max: 3.7 L/min/m2  SVI  Min: 49  Max: 61  SVV  Min: 3 %  Max: 6 %  Resp  Min: 11  Max: 40  SpO2  Min: 93 %  Max: 100 %  Oxygen Concentration (%)  Min: 50  Max: 50    06/04 0701 - 06/05 0700  In: 2792.4 [I.V.:704.6]  Out: 2892 [Urine:1142]   Unmeasured Output  Urine Occurrence: 0  Stool Occurrence: 1  Emesis Occurrence: 0  Pad Count: 2        Physical Exam  Vitals and nursing note reviewed.   Constitutional:       Comments: Opens eye spontaneously, following commands   HENT:      Head: Normocephalic and atraumatic.   Eyes:      Extraocular Movements: Extraocular movements intact.      Pupils: Pupils are equal, round, and reactive to light.   Cardiovascular:      Rate and Rhythm: Normal rate.   Pulmonary:      Comments: Pending   Abdominal:      Palpations: Abdomen is soft.   Musculoskeletal:      Cervical back: Neck supple.   Skin:     General: Skin is warm.   Neurological:      Mental Status: She is alert.      Comments:   Alert, follows commands in all extremities  Intubated, unable to test orientation  Pupils equal, reactive  Face grossly symmetric   Moves all extremities antigravity and to commands          Medications:  ContinuousNORepinephrine bitartrate-D5W, Last Rate: 0.03 mcg/kg/min (06/05/23 0805)  vasopressin, Last Rate: 0.04 Units/min (06/05/23  0805)    ScheduledARIPiprazole, 5 mg, Daily  cefTRIAXone (ROCEPHIN) IVPB, 2 g, Q24H  folic acid, 1 mg, Daily  lactulose, 200 g, TID  levETIRAcetam (Keppra) IV (PEDS and ADULTS), 500 mg, Q12H  mupirocin, , BID  pantoprazole, 40 mg, BID  rifAXIMin, 550 mg, BID  sodium bicarbonate, 1,300 mg, BID  thiamine, 100 mg, Daily    PRNsodium chloride, , Q24H PRN  aluminum-magnesium hydroxide-simethicone, 30 mL, QID PRN  dextrose 10%, 12.5 g, PRN  dextrose 10%, 25 g, PRN  dextrose, 15 g, PRN  dextrose, 30 g, PRN  fentaNYL, 50 mcg, Q1H PRN  glucagon (human recombinant), 1 mg, PRN  insulin aspart U-100, 0-5 Units, QID (AC + HS) PRN  magnesium oxide, 800 mg, PRN  magnesium oxide, 800 mg, PRN  melatonin, 6 mg, Nightly PRN  naloxone, 0.02 mg, PRN  ondansetron, 4 mg, Q8H PRN  potassium bicarbonate, 35 mEq, PRN  potassium bicarbonate, 50 mEq, PRN  potassium bicarbonate, 60 mEq, PRN  potassium, sodium phosphates, 2 packet, PRN  potassium, sodium phosphates, 2 packet, PRN  potassium, sodium phosphates, 2 packet, PRN  simethicone, 1 tablet, QID PRN  sodium chloride 0.9%, 10 mL, Q8H PRN      Today I personally reviewed pertinent medications, lines/drains/airways, imaging, laboratory results, microbiology results, notably:    CBC, CMP, Mg, Phos, ABG    Diet  Diet NPO  Diet NPO

## 2023-06-05 NOTE — PLAN OF CARE
[facility] Care Plan    POC reviewed with Marely Hamilton and family (sister via phone at at 1500. Pt  intubated and unable to verbalize understanding.  Sister Zaria on phone verbalized understanding. Questions and concerns addressed. No acute events today. Pt progressing toward goals. Will continue to monitor. See below and flowsheets for full assessment and VS info.     - Neuro exam improving- FC x4 extremities   - SBT this morning- pt went apneic after about 10 minutes but pulled good TV while on it.    - FI02 dropped from 50 to 40% per MD Cirilo- anticipate another SBT in am  - No s/sx of bleeding  - sterile dressing change to Fem TLC and GLORIA ML  - Bathed and linens changed        Is this a stroke patient? no    Neuro:  Tobias Coma Scale  Best Eye Response: 3-->(E3) to speech  Best Motor Response: 6-->(M6) obeys commands  Best Verbal Response: 1-->(V1) none  Tobias Coma Scale Score: 10  Assessment Qualifiers: patient intubated  Pupil PERRLA: no     24 hr Temp:  [95.2 °F (35.1 °C)-99.1 °F (37.3 °C)]     CV:   Rhythm: normal sinus rhythm  BP goals:   SBP <   MAP > 65    Resp:      Vent Mode: A/C  Set Rate: 14 BPM  Oxygen Concentration (%): 50  Vt Set: 320 mL  PEEP/CPAP: 5 cmH20    Plan: wean to extubate    GI/:     Diet/Nutrition Received: tube feeding  Last Bowel Movement: 06/05/23  Voiding Characteristics: urethral catheter (bladder)    Intake/Output Summary (Last 24 hours) at 6/5/2023 1833  Last data filed at 6/5/2023 1805  Gross per 24 hour   Intake 2763.15 ml   Output 1972 ml   Net 791.15 ml     Unmeasured Output  Urine Occurrence: 0  Stool Occurrence: 1  Emesis Occurrence: 0  Pad Count: 2    Labs/Accuchecks:  Recent Labs   Lab 06/05/23  1159   WBC 5.67   RBC 2.40*   HGB 7.5*   HCT 23.2*   PLT 81*      Recent Labs   Lab 06/05/23  0037      K 3.8   CO2 21*   *   BUN 15   CREATININE 0.6   ALKPHOS 127   ALT 35   AST 34   BILITOT 3.4*      Recent Labs   Lab 06/05/23 0037 06/05/23  0748   INR 2.0*   --    APTT  --  41.8*    No results for input(s): CPK, CPKMB, TROPONINI, MB in the last 168 hours.    Electrolytes: N/A - electrolytes WDL  Accuchecks: Q6H    Gtts:   NORepinephrine bitartrate-D5W 0.06 mcg/kg/min (06/05/23 1805)    vasopressin Stopped (06/05/23 0835)       LDA/Wounds:  Lines/Drains/Airways       Central Venous Catheter Line  Duration             Percutaneous Central Line Insertion/Assessment - Triple Lumen  06/03/23 1345 Femoral Right 2 days              Drain  Duration                  NG/OG Tube 06/01/23 2200 Right nostril 3 days         Urethral Catheter 06/01/23 2101 Temperature probe 3 days         Rectal Tube 06/03/23 1300 2 days              Airway  Duration                  Airway - Non-Surgical 06/02/23 1118 Endotracheal Tube 3 days              Arterial Line  Duration             Arterial Line 06/02/23 1050 Left Radial 3 days              Peripheral Intravenous Line  Duration                  Midline Catheter Insertion/Assessment  - Single Lumen 05/25/23 1005 Right basilic vein (medial side of arm) other (see comments) 11 days         Peripheral IV - Single Lumen 06/02/23 0030 20 G Anterior;Left Foot 3 days         Peripheral IV - Single Lumen 06/02/23 2200 20 G Anterior;Right Ankle 2 days                  Wounds: Yes  Wound care consulted: Yes

## 2023-06-05 NOTE — PROGRESS NOTES
[facility]  Gastroenterology  Progress Note    Patient Name: Marely Hamilton  MRN: [id number]  Admission Date: 5/28/2023  Hospital Length of Stay: 8 days  Code Status: Full Code   Attending Provider: Myles Kerr DO  Consulting Provider: Robina Damian NP  Primary Care Physician: Viktor Ross MD  Principal Problem: Non-convulsive status epilepticus    Subjective:     Interval History: Followed up with patient for overt signs of GI bleeding. Counts have been stable. Last transfusion on 6/4. BMs light brown in color. OG tube eith tube feeds infusing. No blood noted in oropharynx when suctioning. Currently on low dose levophed and vasopressin with adequate blood pressures.     Review of Systems   Unable to perform ROS: Intubated   Objective:     Vital Signs (Most Recent):  Temp: (!) 95.9 °F (35.5 °C) (06/05/23 0811)  Pulse: (!) 59 (06/05/23 0811)  Resp: 15 (06/05/23 0811)  BP: 119/62 (06/05/23 0805)  SpO2: 98 % (06/05/23 0811) Vital Signs (24h Range):  Temp:  [95.2 °F (35.1 °C)-97.5 °F (36.4 °C)] 95.9 °F (35.5 °C)  Pulse:  [55-76] 59  Resp:  [11-40] 15  SpO2:  [93 %-100 %] 98 %  BP: ()/(55-69) 119/62  Arterial Line BP: ()/(41-65) 120/59     Weight: 59.9 kg (132 lb) (06/03/23 1800)  Body mass index is 24.14 kg/m².      Intake/Output Summary (Last 24 hours) at 6/5/2023 0826  Last data filed at 6/5/2023 0805  Gross per 24 hour   Intake 2417.13 ml   Output 2852 ml   Net -434.87 ml       Lines/Drains/Airways       Central Venous Catheter Line  Duration             Percutaneous Central Line Insertion/Assessment - Triple Lumen  06/03/23 1345 Femoral Right 1 day              Drain  Duration                  NG/OG Tube 06/01/23 2200 Right nostril 3 days         Urethral Catheter 06/01/23 2101 Temperature probe 3 days         Rectal Tube 06/03/23 1300 1 day              Airway  Duration                  Airway - Non-Surgical 06/02/23 1118 Endotracheal Tube 2 days              Arterial Line   Duration             Arterial Line 06/02/23 1050 Left Radial 2 days              Peripheral Intravenous Line  Duration                  Midline Catheter Insertion/Assessment  - Single Lumen 05/25/23 1005 Right basilic vein (medial side of arm) other (see comments) 10 days         Peripheral IV - Single Lumen 06/02/23 0030 20 G Anterior;Left Foot 3 days         Peripheral IV - Single Lumen 06/02/23 2200 20 G Anterior;Right Ankle 2 days                     Physical Exam  Vitals and nursing note reviewed.   Constitutional:       Appearance: She is ill-appearing.   HENT:      Mouth/Throat:      Mouth: Mucous membranes are moist.      Pharynx: Oropharynx is clear.   Eyes:      General: Scleral icterus present.   Abdominal:      General: Abdomen is flat. Bowel sounds are normal. There is no distension.      Palpations: Abdomen is soft. There is no mass.      Tenderness: There is no abdominal tenderness.      Hernia: No hernia is present.   Skin:     General: Skin is warm and dry.      Capillary Refill: Capillary refill takes less than 2 seconds.      Coloration: Skin is jaundiced. Skin is not pale.      Findings: No bruising or erythema.   Neurological:      Comments: sedated        Significant Labs:  CBC:   Recent Labs   Lab 06/04/23  1132 06/04/23  1706 06/05/23  0037   WBC 6.26 5.46 5.00   HGB 7.3* 7.7* 7.8*   HCT 23.1* 23.6* 24.8*   PLT 69* 53* 57*     CMP:   Recent Labs   Lab 06/05/23  0037   *   CALCIUM 7.3*   ALBUMIN 2.9*   PROT 4.7*      K 3.8   CO2 21*   *   BUN 15   CREATININE 0.6   ALKPHOS 127   ALT 35   AST 34   BILITOT 3.4*     Coagulation:   Recent Labs   Lab 06/05/23  0037 06/05/23  0748   INR 2.0*  --    APTT  --  41.8*         Significant Imaging:  Imaging results within the past 24 hours have been reviewed.    Assessment/Plan:     Oncology  Anemia  - No plans for endoscopy at this time unless overt GI bleeding develops  - Trend Hgb as clinically inducated. Transfuse for Hgb <7, unless  otherwise indicated  - Diet per primary team  - Continue IV pantoprazole 40mg BID (ordered)  - Please correct any coagulopathy with platelets and FFP for goal of platelets >50K and INR <2.0   - Please notify GI team if there is significant change in patient's clinical status        Thank you for your consult. After careful review of labs and patient current status with the GI staff and fellow, it has been decided that I will sign off. Please contact us if you have any additional questions.     Robina Damian NP  Gastroenterology  [facility]

## 2023-06-05 NOTE — PLAN OF CARE
Recommendations     1. Recommend goal of Impact Peptide 1.5 @ 40 ml/hr to provide 1440 kcal, 90 g pro, 739 ml water. Advance/adjust as appropriate.      2. Bolus TF rec: Impact Peptide 1.5 4 cans per day to provide 1500 kcal, 94 g pro, 772 mL water.      3. RD following.     Goals: Meet % EEN by RD f/u.  Nutrition Goal Status: new  Communication of RD Recs: other (comment) (POC)

## 2023-06-05 NOTE — PLAN OF CARE
[facility] Care Plan    POC reviewed with Marely Hamilton and family at 0300. Pt verbalized understanding. Questions and concerns addressed. No acute events overnight. Pt progressing toward goals. Will continue to monitor. See below and flowsheets for full assessment and VS info.     - Pt opening eyes spontaneously/to voice  - Fentanyl given x4 for vent dyssynchrony  - Vaso, Levo gtt continued to maintain MAP >65  - K, Phos replacements given per one-time order      Is this a stroke patient? no    Neuro:  Burton Coma Scale  Best Eye Response: 3-->(E3) to speech  Best Motor Response: 4-->(M4) withdraws from pain  Best Verbal Response: 1-->(V1) none  Cheryl Coma Scale Score: 8  Assessment Qualifiers: patient intubated  Pupil PERRLA: no     24hr Temp:  [95.4 °F (35.2 °C)-97.5 °F (36.4 °C)]     CV:   Rhythm: normal sinus rhythm, atrial rhythm  BP goals:   SBP < 180  MAP > 65    Resp:      Vent Mode: A/C  Set Rate: 14 BPM  Oxygen Concentration (%): 50  Vt Set: 320 mL  PEEP/CPAP: 5 cmH20    Plan: wean to extubate    GI/:     Diet/Nutrition Received: tube feeding  Last Bowel Movement: 06/03/23  Voiding Characteristics: urethral catheter (bladder)    Intake/Output Summary (Last 24 hours) at 6/5/2023 0401  Last data filed at 6/5/2023 0301  Gross per 24 hour   Intake 2603.51 ml   Output 2822 ml   Net -218.49 ml     Unmeasured Output  Urine Occurrence: 0  Stool Occurrence: 1  Emesis Occurrence: 0  Pad Count: 2    Labs/Accuchecks:  Recent Labs   Lab 06/05/23 0037   WBC 5.00   RBC 2.47*   HGB 7.8*   HCT 24.8*   PLT 57*      Recent Labs   Lab 06/05/23 0037      K 3.8   CO2 21*   *   BUN 15   CREATININE 0.6   ALKPHOS 127   ALT 35   AST 34   BILITOT 3.4*      Recent Labs   Lab 06/04/23 2103 06/05/23 0037   INR  --  2.0*   APTT 44.4*  --     No results for input(s): CPK, CPKMB, TROPONINI, MB in the last 168 hours.    Electrolytes: Electrolytes replaced  Accuchecks: ACHS    Gtts:   NORepinephrine bitartrate-D5W 0.06  mcg/kg/min (06/05/23 0301)    vasopressin 0.04 Units/min (06/05/23 0301)       LDA/Wounds:  Lines/Drains/Airways       Central Venous Catheter Line  Duration             Percutaneous Central Line Insertion/Assessment - Triple Lumen  06/03/23 1345 Femoral Right 1 day              Drain  Duration                  NG/OG Tube 06/01/23 2200 Right nostril 3 days         Urethral Catheter 06/01/23 2101 Temperature probe 3 days         Rectal Tube 06/03/23 1300 1 day              Airway  Duration                  Airway - Non-Surgical 06/02/23 1118 Endotracheal Tube 2 days              Arterial Line  Duration             Arterial Line 06/02/23 1050 Left Radial 2 days              Peripheral Intravenous Line  Duration                  Midline Catheter Insertion/Assessment  - Single Lumen 05/25/23 1005 Right basilic vein (medial side of arm) other (see comments) 10 days         Peripheral IV - Single Lumen 06/02/23 0030 20 G Anterior;Left Foot 3 days         Peripheral IV - Single Lumen 06/02/23 2200 20 G Anterior;Right Ankle 2 days                  Wounds: Yes  Wound care consulted: Yes

## 2023-06-05 NOTE — PLAN OF CARE
[facility]  Discharge Reassessment    Primary Care Provider: Viktor Ross MD    Expected Discharge Date: 6/15/2023    Patient intubated on vent.  Not medically stable for discharge.    Per MD: . Daily SBT, monitor and hold TF at midnight for possible extubation tomorrow.        Reassessment (most recent)       Discharge Reassessment - 06/05/23 1652          Discharge Reassessment    Assessment Type Discharge Planning Reassessment     Did the patient's condition or plan change since previous assessment? No     Communicated BRAYAN with patient/caregiver Date not available/Unable to determine     Discharge Plan A Long-term acute care facility (LTAC)     Discharge Plan B Rehab     DME Needed Upon Discharge  none     Transition of Care Barriers None     Why the patient remains in the hospital Requires continued medical care                   Dixie Faith RN, CCRN-K, [facility]  Neuro-Critical Care   X [phone]

## 2023-06-05 NOTE — ASSESSMENT & PLAN NOTE
- No plans for endoscopy at this time unless overt GI bleeding develops  - Trend Hgb as clinically inducated. Transfuse for Hgb <7, unless otherwise indicated  - Diet per primary team  - Continue IV pantoprazole 40mg BID (ordered)  - Please correct any coagulopathy with platelets and FFP for goal of platelets >50K and INR <2.0   - Please notify GI team if there is significant change in patient's clinical status

## 2023-06-05 NOTE — ASSESSMENT & PLAN NOTE
58F with EtOH induced hepatic cirrhosis, seizure disorder on outpatient LEV and ZNS and bipolar disorder admitted on 5/28 for persistent encephalopathy.    - UTI on admit (Proteus mirabilis), now s/p CTX course  - Hypercalcemia 2/2 lithium toxicity (Lithium changed to Abilify per Psych)  - Hepatic encephalopathy 2/2 to medication non compliance, treated with lactulose and rifaximin    MRI Brain WO was unremarkable for acute neurologic change  NH4 48.  EEG w/ frequent left hemispheric electrographic seizures lasting on average 10-30 seconds with prolonged seizures over 1.5 hours also noted.   Repeat EEGs without seizure activity x 24 hours, Dc    - Admit to [facility] for airway watch   - Q1h neurochecks while in ICU  - Q1h vitals while in ICU  - HOB@30  - Keppra 500mg BID  - Thiamine  - MAP>65  - Daily CMP, Mag, Phos - replete electrolytes PRN  - Lipid panel, A1c, Coags reviewed  - Daily CBC, transfuse PRN  - Start SubQ Heparin in when clinically indicated, currently held due concern for GI bleeding   - PT/OT/SLP as appropriate  - CM/SW consult for dispo planning

## 2023-06-05 NOTE — ASSESSMENT & PLAN NOTE
Continue Lactulose and Rifaximin   Follow coags and fibrinogen q12 and cbc q6  SBP prophylaxis with ceftriaxone

## 2023-06-05 NOTE — CONSULTS
[facility]  Adult Nutrition  Consult Note    SUMMARY     Recommendations    1. Recommend goal of Impact Peptide 1.5 @ 40 ml/hr to provide 1440 kcal, 90 g pro, 739 ml water. Advance/adjust as appropriate.     2. Bolus TF rec: Impact Peptide 1.5 4 cans per day to provide 1500 kcal, 94 g pro, 772 mL water.     3. RD following.    Goals: Meet % EEN by RD f/u.  Nutrition Goal Status: new  Communication of RD Recs: other (comment) (POC)    Assessment and Plan    Severe protein-calorie malnutrition  Malnutrition Type:  Context: Acute  Level: Severe     Related to (etiology):   Hepatic encephalopathy  Altered mental status     Signs and Symptoms (as evidenced by):   Wt loss of 28.55% x 3 months  Severe buccal, orbital, and temporal depletion     Malnutrition Characteristic Summary:  Wt loss of 28.55% x 3 months  Severe buccal, orbital, and temporal depletion     Interventions/Recommendations (treatment strategy):  1. Initiate nutrition when medically able - consult RD for recs as needed.   2. Monitor NPO status, weight changes, and labs.   3. RD to follow.     Nutrition Diagnosis Status:   Continues       Malnutrition Assessment    Orbital Region (Subcutaneous Fat Loss): severe depletion   [place] Region (Muscle Loss): severe depletion     Reason for Assessment    Reason For Assessment: consult  Diagnosis: other (see comments) (Non-convulsive status epilepticus)  Relevant Medical History: addiction to drugs, ETOH abuse, anemia, anxiety, behavioral disorder, bipolar disorder, cirrhosis, hypotension, glaucoma, hx of seizure  Interdisciplinary Rounds: did not attend    General Information Comments: RD consulted for TF recommendations. Patient ventilated. Current feeds of Impact Peptide 1.5 @ 20 ml/hr via NGT. NFPE performed on 5/19 per nutrition chart review. Patient previously seen in [place] on 5/25 prior to admit.     Nutrition Discharge Planning: Pending clinical course    Nutrition Risk  "Screen    Nutrition Risk Screen: tube feeding or parenteral nutrition    Nutrition/Diet History    Spiritual, Cultural Beliefs, Amish Practices, Values that Affect Care: no  Food Allergies: NKFA    Anthropometrics    Temp: 97 °F (36.1 °C)  Height: 5' 2" (157.5 cm)  Height (inches): 62 in  Weight Method: Bed Scale  Weight: 59.9 kg (132 lb)  Weight (lb): 132 lb  Ideal Body Weight (IBW), Female: 110 lb  % Ideal Body Weight, Female (lb): 120 %  BMI (Calculated): 24.1  BMI Grade: 18.5-24.9 - normal       Lab/Procedures/Meds    Pertinent Labs Reviewed: reviewed  Pertinent Labs Comments: folic acid, thiamine tablet, vasopressin, norepinephrine  Pertinent Medications Reviewed: reviewed  Pertinent Medications Comments: Cl 113, CO2 21, Glu 209, Ca 7.3, P 2.1, Tpro 4.7, Alb 2.9, Tbili 3.4, Ammonia 55, RBC 2.54, H/H7.8/24.8, Plt 50      Estimated/Assessed Needs    Weight Used For Calorie Calculations: 59.9 kg (132 lb 0.9 oz)  Energy Calorie Requirements (kcal): 1509 kcal  Energy Need Method: [facility] (x 1.3 SF)  Protein Requirements: 90 g (1.5 g/kg)  Weight Used For Protein Calculations: 59.9 kg (132 lb 0.9 oz)        RDA Method (mL): 1509         Nutrition Prescription Ordered    Current Diet Order: NPO  Current Nutrition Support Rate Ordered: 20 (ml)  Current Nutrition Support Frequency Ordered: ml/hr    Evaluation of Received Nutrient/Fluid Intake    Enteral Calories (kcal): 720  Enteral Protein (gm): 45  Enteral (Free Water) Fluid (mL): 370  % Kcal Needs: 48%  % Protein Needs: 50%  I/O: +8.2 L since admit  Comments: LBM 6/3  Tolerance: tolerating  % Intake of Estimated Energy Needs: 25 - 50 %  % Meal Intake: NPO    Nutrition Risk    Level of Risk/Frequency of Follow-up: high (1-2x/wk (new TF))       Monitor and Evaluation    Food and Nutrient Intake: enteral nutrition intake  Food and Nutrient Adminstration: enteral and parenteral nutrition administration  Physical Activity and Function: factors affecting access to " physical activity  Anthropometric Measurements: height/length, weight, weight change, body mass index  Biochemical Data, Medical Tests and Procedures: electrolyte and renal panel, gastrointestinal profile, glucose/endocrine profile, inflammatory profile, lipid profile  Nutrition-Focused Physical Findings: overall appearance, extremities, muscles and bones, head and eyes, skin       Nutrition Follow-Up    RD Follow-up?: Yes

## 2023-06-05 NOTE — ASSESSMENT & PLAN NOTE
Unclear etiology  First suggestion was decreased intravascular volume plus splanchnic dilataion  Responded partially to aggressive crystalloid and colloid hydration  S/p stress dose steroids   No clear signs to suggest sepsis at present  Requires pressor support, weaning as tolerated

## 2023-06-06 LAB
ACANTHOCYTES BLD QL SMEAR: PRESENT
ALBUMIN SERPL BCP-MCNC: 2.6 G/DL (ref 3.5–5.2)
ALLENS TEST: ABNORMAL
ALLENS TEST: ABNORMAL
ALP SERPL-CCNC: 142 U/L (ref 55–135)
ALT SERPL W/O P-5'-P-CCNC: 29 U/L (ref 10–44)
AMMONIA PLAS-SCNC: 54 UMOL/L (ref 10–50)
ANION GAP SERPL CALC-SCNC: 5 MMOL/L (ref 8–16)
ANION GAP SERPL CALC-SCNC: 6 MMOL/L (ref 8–16)
ANISOCYTOSIS BLD QL SMEAR: SLIGHT
APTT PPP: 38 SEC (ref 21–32)
APTT PPP: 39.8 SEC (ref 21–32)
AST SERPL-CCNC: 31 U/L (ref 10–40)
BASO STIPL BLD QL SMEAR: ABNORMAL
BASOPHILS # BLD AUTO: 0.01 K/UL (ref 0–0.2)
BASOPHILS # BLD AUTO: 0.01 K/UL (ref 0–0.2)
BASOPHILS NFR BLD: 0.1 % (ref 0–1.9)
BASOPHILS NFR BLD: 0.2 % (ref 0–1.9)
BILIRUB SERPL-MCNC: 2.3 MG/DL (ref 0.1–1)
BUN SERPL-MCNC: 13 MG/DL (ref 6–20)
BUN SERPL-MCNC: 16 MG/DL (ref 6–20)
BURR CELLS BLD QL SMEAR: ABNORMAL
CALCIUM SERPL-MCNC: 7.8 MG/DL (ref 8.7–10.5)
CALCIUM SERPL-MCNC: 7.8 MG/DL (ref 8.7–10.5)
CHLORIDE SERPL-SCNC: 115 MMOL/L (ref 95–110)
CHLORIDE SERPL-SCNC: 115 MMOL/L (ref 95–110)
CO2 SERPL-SCNC: 24 MMOL/L (ref 23–29)
CO2 SERPL-SCNC: 24 MMOL/L (ref 23–29)
CREAT SERPL-MCNC: 0.5 MG/DL (ref 0.5–1.4)
CREAT SERPL-MCNC: 0.5 MG/DL (ref 0.5–1.4)
DACRYOCYTES BLD QL SMEAR: ABNORMAL
DAT IGG-SP REAG RBC-IMP: NORMAL
DELSYS: ABNORMAL
DELSYS: ABNORMAL
DIFFERENTIAL METHOD: ABNORMAL
DIFFERENTIAL METHOD: ABNORMAL
DOHLE BOD BLD QL SMEAR: PRESENT
EOSINOPHIL # BLD AUTO: 0 K/UL (ref 0–0.5)
EOSINOPHIL # BLD AUTO: 0.2 K/UL (ref 0–0.5)
EOSINOPHIL NFR BLD: 0.4 % (ref 0–8)
EOSINOPHIL NFR BLD: 2.8 % (ref 0–8)
ERYTHROCYTE [DISTWIDTH] IN BLOOD BY AUTOMATED COUNT: 25 % (ref 11.5–14.5)
ERYTHROCYTE [DISTWIDTH] IN BLOOD BY AUTOMATED COUNT: 25.2 % (ref 11.5–14.5)
ERYTHROCYTE [SEDIMENTATION RATE] IN BLOOD BY WESTERGREN METHOD: 14 MM/H
ERYTHROCYTE [SEDIMENTATION RATE] IN BLOOD BY WESTERGREN METHOD: 14 MM/H
EST. GFR  (NO RACE VARIABLE): >60 ML/MIN/1.73 M^2
EST. GFR  (NO RACE VARIABLE): >60 ML/MIN/1.73 M^2
FIBRINOGEN PPP-MCNC: 126 MG/DL (ref 182–400)
FIBRINOGEN PPP-MCNC: 134 MG/DL (ref 182–400)
FIO2: 40
FIO2: 40
GLUCOSE SERPL-MCNC: 120 MG/DL (ref 70–110)
GLUCOSE SERPL-MCNC: 177 MG/DL (ref 70–110)
HCO3 UR-SCNC: 22.5 MMOL/L (ref 24–28)
HCO3 UR-SCNC: 22.8 MMOL/L (ref 24–28)
HCT VFR BLD AUTO: 24.4 % (ref 37–48.5)
HCT VFR BLD AUTO: 28.1 % (ref 37–48.5)
HGB BLD-MCNC: 7.8 G/DL (ref 12–16)
HGB BLD-MCNC: 8.9 G/DL (ref 12–16)
HYPOCHROMIA BLD QL SMEAR: ABNORMAL
IMM GRANULOCYTES # BLD AUTO: 0.03 K/UL (ref 0–0.04)
IMM GRANULOCYTES # BLD AUTO: 0.04 K/UL (ref 0–0.04)
IMM GRANULOCYTES NFR BLD AUTO: 0.5 % (ref 0–0.5)
IMM GRANULOCYTES NFR BLD AUTO: 0.5 % (ref 0–0.5)
INR PPP: 1.9 (ref 0.8–1.2)
LYMPHOCYTES # BLD AUTO: 0.8 K/UL (ref 1–4.8)
LYMPHOCYTES # BLD AUTO: 0.9 K/UL (ref 1–4.8)
LYMPHOCYTES NFR BLD: 14.5 % (ref 18–48)
LYMPHOCYTES NFR BLD: 9.9 % (ref 18–48)
MAGNESIUM SERPL-MCNC: 2 MG/DL (ref 1.6–2.6)
MCH RBC QN AUTO: 31.8 PG (ref 27–31)
MCH RBC QN AUTO: 31.8 PG (ref 27–31)
MCHC RBC AUTO-ENTMCNC: 31.7 G/DL (ref 32–36)
MCHC RBC AUTO-ENTMCNC: 32 G/DL (ref 32–36)
MCV RBC AUTO: 100 FL (ref 82–98)
MCV RBC AUTO: 100 FL (ref 82–98)
MODE: ABNORMAL
MODE: ABNORMAL
MONOCYTES # BLD AUTO: 0.4 K/UL (ref 0.3–1)
MONOCYTES # BLD AUTO: 0.5 K/UL (ref 0.3–1)
MONOCYTES NFR BLD: 5.6 % (ref 4–15)
MONOCYTES NFR BLD: 8.3 % (ref 4–15)
NEUTROPHILS # BLD AUTO: 4.7 K/UL (ref 1.8–7.7)
NEUTROPHILS # BLD AUTO: 6.4 K/UL (ref 1.8–7.7)
NEUTROPHILS NFR BLD: 73.7 % (ref 38–73)
NEUTROPHILS NFR BLD: 83.5 % (ref 38–73)
NRBC BLD-RTO: 0 /100 WBC
NRBC BLD-RTO: 0 /100 WBC
OVALOCYTES BLD QL SMEAR: ABNORMAL
PATH REV BLD -IMP: NORMAL
PCO2 BLDA: 33.8 MMHG (ref 35–45)
PCO2 BLDA: 34.1 MMHG (ref 35–45)
PEEP: 5
PH SMN: 7.43 [PH] (ref 7.35–7.45)
PH SMN: 7.44 [PH] (ref 7.35–7.45)
PHOSPHATE SERPL-MCNC: 1.3 MG/DL (ref 2.7–4.5)
PHOSPHATE SERPL-MCNC: 2.1 MG/DL (ref 2.7–4.5)
PIP: 20
PLATELET # BLD AUTO: 63 K/UL (ref 150–450)
PLATELET # BLD AUTO: 67 K/UL (ref 150–450)
PLATELET BLD QL SMEAR: ABNORMAL
PMV BLD AUTO: 10.6 FL (ref 9.2–12.9)
PMV BLD AUTO: 10.8 FL (ref 9.2–12.9)
PO2 BLDA: 78 MMHG (ref 80–100)
PO2 BLDA: 86 MMHG (ref 80–100)
POC BE: -1 MMOL/L
POC BE: -2 MMOL/L
POC SATURATED O2: 96 % (ref 95–100)
POC SATURATED O2: 97 % (ref 95–100)
POC TCO2: 24 MMOL/L (ref 23–27)
POC TCO2: 24 MMOL/L (ref 23–27)
POCT GLUCOSE: 108 MG/DL (ref 70–110)
POCT GLUCOSE: 109 MG/DL (ref 70–110)
POCT GLUCOSE: 175 MG/DL (ref 70–110)
POCT GLUCOSE: 203 MG/DL (ref 70–110)
POIKILOCYTOSIS BLD QL SMEAR: SLIGHT
POLYCHROMASIA BLD QL SMEAR: ABNORMAL
POTASSIUM SERPL-SCNC: 2.7 MMOL/L (ref 3.5–5.1)
POTASSIUM SERPL-SCNC: 2.9 MMOL/L (ref 3.5–5.1)
PROT SERPL-MCNC: 4.4 G/DL (ref 6–8.4)
PROTHROMBIN TIME: 19.7 SEC (ref 9–12.5)
PYRIDOXAL SERPL-MCNC: 6 UG/L (ref 5–50)
RBC # BLD AUTO: 2.45 M/UL (ref 4–5.4)
RBC # BLD AUTO: 2.8 M/UL (ref 4–5.4)
SAMPLE: ABNORMAL
SAMPLE: ABNORMAL
SCHISTOCYTES BLD QL SMEAR: PRESENT
SITE: ABNORMAL
SITE: ABNORMAL
SODIUM SERPL-SCNC: 144 MMOL/L (ref 136–145)
SODIUM SERPL-SCNC: 145 MMOL/L (ref 136–145)
SP02: 97
SP02: 98
SPHEROCYTES BLD QL SMEAR: ABNORMAL
TOXIC GRANULES BLD QL SMEAR: PRESENT
VIT B1 BLD-MCNC: 87 UG/L (ref 38–122)
VT: 320
WBC # BLD AUTO: 6.35 K/UL (ref 3.9–12.7)
WBC # BLD AUTO: 7.71 K/UL (ref 3.9–12.7)

## 2023-06-06 PROCEDURE — 85060 BLOOD SMEAR INTERPRETATION: CPT | Mod: ,,, | Performed by: PATHOLOGY

## 2023-06-06 PROCEDURE — 25000003 PHARM REV CODE 250: Performed by: PHYSICIAN ASSISTANT

## 2023-06-06 PROCEDURE — 27200966 HC CLOSED SUCTION SYSTEM

## 2023-06-06 PROCEDURE — 27000221 HC OXYGEN, UP TO 24 HOURS

## 2023-06-06 PROCEDURE — 85060 PATHOLOGIST REVIEW: ICD-10-PCS | Mod: ,,, | Performed by: PATHOLOGY

## 2023-06-06 PROCEDURE — 63600175 PHARM REV CODE 636 W HCPCS: Performed by: PSYCHIATRY & NEUROLOGY

## 2023-06-06 PROCEDURE — 25000003 PHARM REV CODE 250: Performed by: STUDENT IN AN ORGANIZED HEALTH CARE EDUCATION/TRAINING PROGRAM

## 2023-06-06 PROCEDURE — 99900035 HC TECH TIME PER 15 MIN (STAT)

## 2023-06-06 PROCEDURE — 85610 PROTHROMBIN TIME: CPT | Performed by: NURSE PRACTITIONER

## 2023-06-06 PROCEDURE — 99223 1ST HOSP IP/OBS HIGH 75: CPT | Mod: GC,,, | Performed by: INTERNAL MEDICINE

## 2023-06-06 PROCEDURE — C9113 INJ PANTOPRAZOLE SODIUM, VIA: HCPCS | Performed by: STUDENT IN AN ORGANIZED HEALTH CARE EDUCATION/TRAINING PROGRAM

## 2023-06-06 PROCEDURE — 84100 ASSAY OF PHOSPHORUS: CPT | Performed by: PSYCHIATRY & NEUROLOGY

## 2023-06-06 PROCEDURE — 94761 N-INVAS EAR/PLS OXIMETRY MLT: CPT

## 2023-06-06 PROCEDURE — 86880 COOMBS TEST DIRECT: CPT | Performed by: STUDENT IN AN ORGANIZED HEALTH CARE EDUCATION/TRAINING PROGRAM

## 2023-06-06 PROCEDURE — 85384 FIBRINOGEN ACTIVITY: CPT | Mod: 91 | Performed by: PSYCHIATRY & NEUROLOGY

## 2023-06-06 PROCEDURE — 99291 CRITICAL CARE FIRST HOUR: CPT | Mod: GC,,, | Performed by: PSYCHIATRY & NEUROLOGY

## 2023-06-06 PROCEDURE — 20000000 HC ICU ROOM

## 2023-06-06 PROCEDURE — 85730 THROMBOPLASTIN TIME PARTIAL: CPT | Mod: 91 | Performed by: PSYCHIATRY & NEUROLOGY

## 2023-06-06 PROCEDURE — 99223 PR INITIAL HOSPITAL CARE,LEVL III: ICD-10-PCS | Mod: GC,,, | Performed by: INTERNAL MEDICINE

## 2023-06-06 PROCEDURE — 25000003 PHARM REV CODE 250: Performed by: PSYCHIATRY & NEUROLOGY

## 2023-06-06 PROCEDURE — 82525 ASSAY OF COPPER: CPT | Performed by: STUDENT IN AN ORGANIZED HEALTH CARE EDUCATION/TRAINING PROGRAM

## 2023-06-06 PROCEDURE — 80053 COMPREHEN METABOLIC PANEL: CPT | Performed by: PSYCHIATRY & NEUROLOGY

## 2023-06-06 PROCEDURE — 84100 ASSAY OF PHOSPHORUS: CPT | Mod: 91 | Performed by: PHYSICIAN ASSISTANT

## 2023-06-06 PROCEDURE — 85240 CLOT FACTOR VIII AHG 1 STAGE: CPT | Performed by: STUDENT IN AN ORGANIZED HEALTH CARE EDUCATION/TRAINING PROGRAM

## 2023-06-06 PROCEDURE — 83735 ASSAY OF MAGNESIUM: CPT | Performed by: PSYCHIATRY & NEUROLOGY

## 2023-06-06 PROCEDURE — 80048 BASIC METABOLIC PNL TOTAL CA: CPT | Mod: XB | Performed by: PHYSICIAN ASSISTANT

## 2023-06-06 PROCEDURE — 82140 ASSAY OF AMMONIA: CPT | Performed by: NURSE PRACTITIONER

## 2023-06-06 PROCEDURE — 94003 VENT MGMT INPAT SUBQ DAY: CPT

## 2023-06-06 PROCEDURE — 85025 COMPLETE CBC W/AUTO DIFF WBC: CPT | Mod: 91 | Performed by: STUDENT IN AN ORGANIZED HEALTH CARE EDUCATION/TRAINING PROGRAM

## 2023-06-06 PROCEDURE — 82803 BLOOD GASES ANY COMBINATION: CPT

## 2023-06-06 PROCEDURE — 99900026 HC AIRWAY MAINTENANCE (STAT)

## 2023-06-06 PROCEDURE — 25000003 PHARM REV CODE 250: Performed by: NURSE PRACTITIONER

## 2023-06-06 PROCEDURE — 85025 COMPLETE CBC W/AUTO DIFF WBC: CPT | Performed by: PSYCHIATRY & NEUROLOGY

## 2023-06-06 PROCEDURE — 37799 UNLISTED PX VASCULAR SURGERY: CPT

## 2023-06-06 PROCEDURE — 63600175 PHARM REV CODE 636 W HCPCS: Performed by: STUDENT IN AN ORGANIZED HEALTH CARE EDUCATION/TRAINING PROGRAM

## 2023-06-06 PROCEDURE — 99291 PR CRITICAL CARE, E/M 30-74 MINUTES: ICD-10-PCS | Mod: GC,,, | Performed by: PSYCHIATRY & NEUROLOGY

## 2023-06-06 RX ADMIN — RIFAXIMIN 550 MG: 550 TABLET ORAL at 10:06

## 2023-06-06 RX ADMIN — LEVETIRACETAM 500 MG: 100 INJECTION, SOLUTION INTRAVENOUS at 08:06

## 2023-06-06 RX ADMIN — MUPIROCIN: 20 OINTMENT TOPICAL at 10:06

## 2023-06-06 RX ADMIN — ARIPIPRAZOLE 5 MG: 5 TABLET ORAL at 10:06

## 2023-06-06 RX ADMIN — LACTULOSE 200 G: 10 SOLUTION ORAL at 11:06

## 2023-06-06 RX ADMIN — LACTULOSE 200 G: 10 SOLUTION ORAL at 04:06

## 2023-06-06 RX ADMIN — FENTANYL CITRATE 50 MCG: 0.05 INJECTION, SOLUTION INTRAMUSCULAR; INTRAVENOUS at 12:06

## 2023-06-06 RX ADMIN — PANTOPRAZOLE SODIUM 40 MG: 40 INJECTION, POWDER, FOR SOLUTION INTRAVENOUS at 08:06

## 2023-06-06 RX ADMIN — INSULIN ASPART 4 UNITS: 100 INJECTION, SOLUTION INTRAVENOUS; SUBCUTANEOUS at 05:06

## 2023-06-06 RX ADMIN — FENTANYL CITRATE 50 MCG: 0.05 INJECTION, SOLUTION INTRAMUSCULAR; INTRAVENOUS at 10:06

## 2023-06-06 RX ADMIN — SODIUM BICARBONATE 1300 MG: 650 TABLET ORAL at 09:06

## 2023-06-06 RX ADMIN — FENTANYL CITRATE 50 MCG: 0.05 INJECTION, SOLUTION INTRAMUSCULAR; INTRAVENOUS at 04:06

## 2023-06-06 RX ADMIN — POTASSIUM PHOSPHATE, MONOBASIC AND POTASSIUM PHOSPHATE, DIBASIC 30 MMOL: 224; 236 INJECTION, SOLUTION, CONCENTRATE INTRAVENOUS at 02:06

## 2023-06-06 RX ADMIN — PANTOPRAZOLE SODIUM 40 MG: 40 INJECTION, POWDER, FOR SOLUTION INTRAVENOUS at 10:06

## 2023-06-06 RX ADMIN — FOLIC ACID 1 MG: 1 TABLET ORAL at 10:06

## 2023-06-06 RX ADMIN — FENTANYL CITRATE 50 MCG: 0.05 INJECTION, SOLUTION INTRAMUSCULAR; INTRAVENOUS at 08:06

## 2023-06-06 RX ADMIN — Medication 100 MG: at 10:06

## 2023-06-06 RX ADMIN — CEFTRIAXONE 1 G: 1 INJECTION, POWDER, FOR SOLUTION INTRAMUSCULAR; INTRAVENOUS at 03:06

## 2023-06-06 RX ADMIN — MUPIROCIN: 20 OINTMENT TOPICAL at 09:06

## 2023-06-06 RX ADMIN — SODIUM BICARBONATE 1300 MG: 650 TABLET ORAL at 08:06

## 2023-06-06 RX ADMIN — LEVETIRACETAM 500 MG: 100 INJECTION, SOLUTION INTRAVENOUS at 10:06

## 2023-06-06 RX ADMIN — LACTULOSE 200 G: 10 SOLUTION ORAL at 09:06

## 2023-06-06 RX ADMIN — RIFAXIMIN 550 MG: 550 TABLET ORAL at 08:06

## 2023-06-06 NOTE — ASSESSMENT & PLAN NOTE
Thrombocytopenia    58 year old female with history of alcohol induced liver cirrhosis and seizure disorder who is currently in neuro icu intubated and who was previously on pressors for shock of unknown etiology. Patient has multiple labs suggesting consumptive hemolytic process. She has had anemia as low as 5.3, thrombocytopenia down to 31, elevated bilirubin (primarily indirect), and undetectable haptoglobin (11 on 6/5 labs), elevated coag studies, and low fibrinogen. However, patient's history of cirrhosis clouds the picture as on chart review she has had multiple similar lab abnormalities in the past; cirrhosis can also prolong coagulation studies, explain thrombocytopenia, lead to low haptoglobin etc. Further more recent blood transfusion can cause bilirubin fluctuations. Peripheral smear reviewed, no schistocytes noted. Current anemia and thrombocytopenia likely multifactorial. Patient has been in shock, in status, and has cirrhosis at baseline.    - Ordered CORIN to address autoimmune causes  - Ordered factor VIII, may distinguish between DIC and abnormalities caused by cirrhosis  - pathologist differential ordered   - Regardless of cause, treatment in this cause would be supportive  - Recommend trending hemolysis labs (daily cbc, haptoglobin, reticulocytes). Transfuse for hg<7  - Recommend trending coagulation studies (pt, inr, aptt, fibrinogen). Transfuse cryo for fibrinogen < 100.

## 2023-06-06 NOTE — PLAN OF CARE
06/06/23 1317   Post-Acute Status   Post-Acute Authorization Placement   Post-Acute Placement Status Referrals Sent   Coverage [employer]   Discharge Plan   Discharge Plan A Long-term acute care facility (LTAC)       SW faxed referral for LTAC has been sent to facilities via [employer] for review.   1.[facility] Phone: [phone]   2.[facility] Phone: [phone]    3.[facility] Phone: [phone]       SW will continue to follow patient.       Danyell Palafox LMSW  PRN - [facility]  [id number]

## 2023-06-06 NOTE — HPI
Patient is a 58 year old female with extensive medical history including EtOH induced hepatic cirrhosis, seizure disorder and bipolar disorder who was admitted to [facility] on 5/28 as a transfer from OSH for evaluation of persistent encephalopathy concerning for NCSE vs hepatic encephalopathy. Patient initially admitted to hospital medicine. EEG was done due to waxing and waning mental status. Findings concerning for non convulsive status epilepticus so patient moved to neuro ICU where she was intubated; also hypotensive requiring pressor support. Hematology consulted regarding concern for hemolytic anemia.

## 2023-06-06 NOTE — SUBJECTIVE & OBJECTIVE
Interval History: As above.     Review of Systems   Unable to perform ROS: Intubated     Objective:     Vitals:  Temp: 96.4 °F (35.8 °C)  Pulse: 78  Rhythm: normal sinus rhythm  BP: 136/72  MAP (mmHg): 95  Resp: (!) 21  SpO2: 96 %  Oxygen Concentration (%): 40  Vent Mode: SIMV  Set Rate: 15 BPM  Vt Set: 320 mL  Pressure Support: 8 cmH20  PEEP/CPAP: 5 cmH20  Peak Airway Pressure: 20 cmH20  Mean Airway Pressure: 8.9 cmH20  Plateau Pressure: 0 cmH20    Temp  Min: 96.4 °F (35.8 °C)  Max: 98.4 °F (36.9 °C)  Pulse  Min: 76  Max: 102  BP  Min: 113/53  Max: 140/77  MAP (mmHg)  Min: 76  Max: 103  CI (L/min/m2)  Min: 2.8 L/min/m2  Max: 4.1 L/min/m2  SVI  Min: 38  Max: 52  SVV  Min: 4 %  Max: 4 %  Resp  Min: 8  Max: 37  SpO2  Min: 92 %  Max: 100 %  Oxygen Concentration (%)  Min: 40  Max: 50    06/05 0701 - 06/06 0700  In: 2433 [I.V.:218.6]  Out: 2870 [Urine:1020]   Unmeasured Output  Urine Occurrence: 0  Stool Occurrence: 1  Emesis Occurrence: 0  Pad Count: 2        Physical Exam  Vitals and nursing note reviewed.   Constitutional:       Comments: Opens eye spontaneously, following commands   HENT:      Head: Normocephalic and atraumatic.   Eyes:      Extraocular Movements: Extraocular movements intact.      Pupils: Pupils are equal, round, and reactive to light.   Cardiovascular:      Rate and Rhythm: Normal rate.   Pulmonary:      Comments:   Intubated and mechanically ventilated   SBT with low TV  Abdominal:      Palpations: Abdomen is soft.   Musculoskeletal:      Cervical back: Neck supple.   Skin:     General: Skin is warm.   Neurological:      Mental Status: She is alert.      Comments:   Alert, follows commands in all extremities  Intubated, unable to test orientation  Pupils equal, reactive  Face grossly symmetric   Moves all extremities antigravity and to commands          Medications:  ContinuousNORepinephrine bitartrate-D5W, Last Rate: Stopped (06/06/23 1214)  vasopressin, Last Rate: Stopped (06/05/23  0835)    ScheduledARIPiprazole, 5 mg, Daily  cefTRIAXone (ROCEPHIN) IVPB, 1 g, Q24H  folic acid, 1 mg, Daily  lactulose, 200 g, TID  levETIRAcetam (Keppra) IV (PEDS and ADULTS), 500 mg, Q12H  mupirocin, , BID  pantoprazole, 40 mg, BID  rifAXIMin, 550 mg, BID  sodium bicarbonate, 1,300 mg, BID  thiamine, 100 mg, Daily    PRNaluminum-magnesium hydroxide-simethicone, 30 mL, QID PRN  dextrose 10%, 12.5 g, PRN  dextrose 10%, 25 g, PRN  dextrose, 15 g, PRN  dextrose, 30 g, PRN  fentaNYL, 50 mcg, Q1H PRN  glucagon (human recombinant), 1 mg, PRN  insulin aspart U-100, 1-10 Units, Q6H PRN  magnesium oxide, 800 mg, PRN  magnesium oxide, 800 mg, PRN  melatonin, 6 mg, Nightly PRN  naloxone, 0.02 mg, PRN  ondansetron, 4 mg, Q8H PRN  potassium bicarbonate, 35 mEq, PRN  potassium bicarbonate, 50 mEq, PRN  potassium bicarbonate, 60 mEq, PRN  potassium, sodium phosphates, 2 packet, PRN  potassium, sodium phosphates, 2 packet, PRN  potassium, sodium phosphates, 2 packet, PRN  simethicone, 1 tablet, QID PRN  sodium chloride 0.9%, 10 mL, Q8H PRN      Today I personally reviewed pertinent medications, lines/drains/airways, imaging, laboratory results, microbiology results, notably:    CBC, CMP, Mg, Phos, ABG    Diet  Diet NPO  Diet NPO

## 2023-06-06 NOTE — ASSESSMENT & PLAN NOTE
Continue Lactulose and Rifaximin   Follow coags and fibrinogen q12 and cbc q6  SBP prophylaxis with ceftriaxone x7 days total

## 2023-06-06 NOTE — CONSULTS
[facility]  Hematology/Oncology  Consult Note    Patient Name: Marely Hamilton  MRN: [id number]  Admission Date: 2023  Hospital Length of Stay: 9 days  Code Status: Full Code   Attending Provider: Myles Kerr DO  Consulting Provider: Rios Dong MD  Primary Care Physician: Viktor Ross MD  Principal Problem:Non-convulsive status epilepticus    Inpatient consult to Hematology/Oncology  Consult performed by: Rios Dong MD  Consult ordered by: Cameron Yadav MD        Subjective:     HPI:  Patient is a 58 year old female with extensive medical history including EtOH induced hepatic cirrhosis, seizure disorder and bipolar disorder who was admitted to [facility] on  as a transfer from OSH for evaluation of persistent encephalopathy concerning for NCSE vs hepatic encephalopathy. Patient initially admitted to hospital medicine. EEG was done due to waxing and waning mental status. Findings concerning for non convulsive status epilepticus so patient moved to neuro ICU where she was intubated; also hypotensive requiring pressor support. Hematology consulted regarding concern for hemolytic anemia.         Oncology Treatment Plan:   [No matching plan found]    Medications:  Continuous Infusions:   NORepinephrine bitartrate-D5W Stopped (23 1214)    vasopressin Stopped (23 0835)     Scheduled Meds:   ARIPiprazole  5 mg Per NG tube Daily    cefTRIAXone (ROCEPHIN) IVPB  1 g Intravenous Q24H    folic acid  1 mg Per NG tube Daily    lactulose  200 g Rectal TID    levETIRAcetam (Keppra) IV (PEDS and ADULTS)  500 mg Intravenous Q12H    mupirocin   Nasal BID    pantoprazole  40 mg Intravenous BID    rifAXIMin  550 mg Per NG tube BID    sodium bicarbonate  1,300 mg Per NG tube BID    thiamine  100 mg Per NG tube Daily     PRN Meds:aluminum-magnesium hydroxide-simethicone, dextrose 10%, dextrose 10%, dextrose, dextrose, [] fentaNYL **FOLLOWED BY** fentaNYL,  glucagon (human recombinant), insulin aspart U-100, magnesium oxide, magnesium oxide, melatonin, naloxone, ondansetron, potassium bicarbonate, potassium bicarbonate, potassium bicarbonate, potassium, sodium phosphates, potassium, sodium phosphates, potassium, sodium phosphates, simethicone, sodium chloride 0.9%     Review of patient's allergies indicates:   Allergen Reactions    Sulfa (sulfonamide antibiotics) Rash    Codeine Nausea And Vomiting        Past Medical History:   Diagnosis Date    Addiction to drug     Alcohol abuse     Alcohol abuse, in remission 6/15/2015    14.5 weeks ago; AA weekly    Anemia     Anxiety 6/15/2015    Behavioral problem     Bipolar disorder     Bipolar disorder in remission 6/15/2015    Cirrhosis, Laennec's 6/15/2015    Depression     Encounter for blood transfusion     Epistaxis 6/15/2015    Fatigue     Glaucoma     Hematuria     Hepatic encephalopathy 6/15/2015    Hepatic enlargement     History of psychiatric care     History of psychiatric hospitalization     History of seizure 6/15/2015    1    hx of intentional Tylenol overdose 6/15/2015    2005- situational and hx of bipolar    Hx of psychiatric care     Macrocytic anemia 9/18/2015    6 units PRBC since June 2015    Jeana     Osteoarthritis     Other ascites 6/15/2015    Psychiatric exam requested by authority     Psychiatric problem     Psychosis 9/26/2019    Renal disorder     Seizures     Self-harming behavior     Suicide attempt     Therapy     Thrombocytopenia 6/15/2015     Past Surgical History:   Procedure Laterality Date    COSMETIC SURGERY      ESOPHAGOGASTRODUODENOSCOPY       Family History       Problem Relation (Age of Onset)    Alcohol abuse Father, Sister, Paternal Grandfather    Bipolar disorder Father    Hypertension Mother    Suicide Father          Tobacco Use    Smoking status: Never    Smokeless tobacco: Never   Substance and Sexual Activity    Alcohol use: Not  Currently     Comment: hx of ETOH abuse with cirrhosis of liver    Drug use: No    Sexual activity: Not Currently       Review of Systems   Unable to perform ROS: Intubated   Objective:     Vital Signs (Most Recent):  Temp: 96.4 °F (35.8 °C) (06/06/23 1539)  Pulse: 78 (06/06/23 1539)  Resp: (!) 21 (06/06/23 1539)  BP: 136/72 (06/06/23 1500)  SpO2: 96 % (06/06/23 1539) Vital Signs (24h Range):  Temp:  [96.4 °F (35.8 °C)-98.4 °F (36.9 °C)] 96.4 °F (35.8 °C)  Pulse:  [] 78  Resp:  [8-37] 21  SpO2:  [92 %-100 %] 96 %  BP: (113-140)/(53-80) 136/72  Arterial Line BP: ()/(47-63) 134/60     Weight: 59.9 kg (132 lb)  Body mass index is 24.14 kg/m².  Body surface area is 1.62 meters squared.      Intake/Output Summary (Last 24 hours) at 6/6/2023 1630  Last data filed at 6/6/2023 1521  Gross per 24 hour   Intake 1119.58 ml   Output 3000 ml   Net -1880.42 ml        Physical Exam  Vitals reviewed.   Constitutional:       General: She is not in acute distress.     Appearance: She is well-developed. She is ill-appearing.   HENT:      Head: Normocephalic and atraumatic.      Right Ear: External ear normal.      Left Ear: External ear normal.      Nose: Nose normal.      Mouth/Throat:      Pharynx: No oropharyngeal exudate.   Eyes:      General: No scleral icterus.     Extraocular Movements: Extraocular movements intact.      Conjunctiva/sclera: Conjunctivae normal.   Cardiovascular:      Rate and Rhythm: Normal rate and regular rhythm.      Heart sounds: Normal heart sounds.   Pulmonary:      Comments: Intubated  Abdominal:      General: Abdomen is flat. There is no distension.   Musculoskeletal:         General: No deformity. Normal range of motion.      Cervical back: Neck supple.   Skin:     General: Skin is warm and dry.   Neurological:      Mental Status: She is alert.        Significant Labs:   All pertinent labs from the last 24 hours have been reviewed.    Diagnostic Results:  I have reviewed all pertinent  imaging results/findings within the past 24 hours.    Assessment/Plan:     Macrocytic anemia  Thrombocytopenia    58 year old female with history of alcohol induced liver cirrhosis and seizure disorder who is currently in neuro icu intubated and who was previously on pressors for shock of unknown etiology. Patient has multiple labs suggesting consumptive hemolytic process. She has had anemia as low as 5.3, thrombocytopenia down to 31, elevated bilirubin (primarily indirect), and undetectable haptoglobin (11 on 6/5 labs), elevated coag studies, and low fibrinogen. However, patient's history of cirrhosis clouds the picture as on chart review she has had multiple similar lab abnormalities in the past; cirrhosis can also prolong coagulation studies, explain thrombocytopenia, lead to low haptoglobin etc. Further more recent blood transfusion can cause bilirubin fluctuations. Peripheral smear reviewed, no schistocytes noted. Current anemia and thrombocytopenia likely multifactorial. Patient has been in shock, in status, and has cirrhosis at baseline.    - Ordered CORIN to address autoimmune causes  - Ordered factor VIII, may distinguish between DIC and abnormalities caused by cirrhosis  - pathologist differential ordered   - Regardless of cause, treatment in this cause would be supportive  - Recommend trending hemolysis labs (daily cbc, haptoglobin, reticulocytes). Transfuse for hg<7  - Recommend trending coagulation studies (pt, inr, aptt, fibrinogen). Transfuse cryo for fibrinogen < 100.          Thank you for your consult. I will follow-up with patient. Please contact us if you have any additional questions.    Rios Dong MD  Hematology/Oncology  [facility]

## 2023-06-06 NOTE — ASSESSMENT & PLAN NOTE
58F with EtOH induced hepatic cirrhosis, seizure disorder on outpatient LEV and ZNS and bipolar disorder admitted on 5/28 for persistent encephalopathy.    - UTI on admit (Proteus mirabilis), now s/p CTX course  - Hypercalcemia 2/2 lithium toxicity (Lithium changed to Abilify per Psych)  - Hepatic encephalopathy 2/2 to medication non compliance, treated with lactulose and rifaximin    MRI Brain WO was unremarkable for acute neurologic change  NH4 48.  EEG w/ frequent left hemispheric electrographic seizures lasting on average 10-30 seconds with prolonged seizures over 1.5 hours also noted.   Repeat EEGs without seizure activity x 24 hours, now resolved    - Admit to [facility] for airway watch   - Q1h neurochecks while in ICU  - Q1h vitals while in ICU  - HOB@30  - Keppra 500mg BID  - Thiamine  - MAP>65  - Daily CMP, Mag, Phos - replete electrolytes PRN  - Lipid panel, A1c, Coags reviewed  - Daily CBC, transfuse PRN  - Start SubQ Heparin in when clinically indicated, currently held due concern for GI bleeding   - PT/OT/SLP as appropriate  - CM/SW consult for dispo planning

## 2023-06-06 NOTE — PLAN OF CARE
[facility] Care Plan    POC reviewed with Marely Hamilton and family at 0300. Pt is unable to verbalize understanding but nods/shakes head to confirm information. Reinforcement needed. Questions and concerns addressed. No acute events overnight. Pt progressing toward goals. Will continue to monitor. See below and flowsheets for full assessment and VS info.     - TF off at 0000 for possible extubation today  - 24 h urine collection in process; ends at 1100  - Fentanyl given x3 for vent dyssynchrony/discomfort   - AM K+ was 2.7; phos 1.3; replacing both via IV       Is this a stroke patient? no    Neuro:  Reserve Coma Scale  Best Eye Response: 3-->(E3) to speech  Best Motor Response: 6-->(M6) obeys commands  Best Verbal Response: 1-->(V1) none  Cheryl Coma Scale Score: 10  Assessment Qualifiers: patient intubated  Pupil PERRLA: no     24hr Temp:  [95.9 °F (35.5 °C)-99.1 °F (37.3 °C)]     CV:   Rhythm: normal sinus rhythm, atrial rhythm  BP goals:   SBP < 180  MAP > 65    Resp:      Vent Mode: A/C  Set Rate: 14 BPM  Oxygen Concentration (%): 40  Vt Set: 320 mL  PEEP/CPAP: 5 cmH20    Plan: wean to extubate    GI/:     Diet/Nutrition Received: tube feeding  Last Bowel Movement: 06/05/23  Voiding Characteristics: urethral catheter (bladder)    Intake/Output Summary (Last 24 hours) at 6/6/2023 0718  Last data filed at 6/6/2023 0501  Gross per 24 hour   Intake 2395.45 ml   Output 2840 ml   Net -444.55 ml     Unmeasured Output  Urine Occurrence: 0  Stool Occurrence: 1  Emesis Occurrence: 0  Pad Count: 2    Labs/Accuchecks:  Recent Labs   Lab 06/06/23 0106   WBC 7.71   RBC 2.45*   HGB 7.8*   HCT 24.4*   PLT 67*      Recent Labs   Lab 06/06/23 0106      K 2.7*   CO2 24   *   BUN 16   CREATININE 0.5   ALKPHOS 142*   ALT 29   AST 31   BILITOT 2.3*      Recent Labs   Lab 06/05/23  1937 06/06/23 0106   INR  --  1.9*   APTT 44.0*  --     No results for input(s): CPK, CPKMB, TROPONINI, MB in the last 168  hours.    Electrolytes: Electrolytes replaced  Accuchecks: Q6H    Gtts:   NORepinephrine bitartrate-D5W 0.06 mcg/kg/min (06/06/23 0301)    vasopressin Stopped (06/05/23 0835)       LDA/Wounds:  Lines/Drains/Airways       Central Venous Catheter Line  Duration             Percutaneous Central Line Insertion/Assessment - Triple Lumen  06/03/23 1345 Femoral Right 2 days              Drain  Duration                  NG/OG Tube 06/01/23 2200 Right nostril 4 days         Urethral Catheter 06/01/23 2101 Temperature probe 4 days         Rectal Tube 06/03/23 1300 2 days              Airway  Duration                  Airway - Non-Surgical 06/02/23 1118 Endotracheal Tube 3 days              Arterial Line  Duration             Arterial Line 06/02/23 1050 Left Radial 3 days              Peripheral Intravenous Line  Duration                  Midline Catheter Insertion/Assessment  - Single Lumen 05/25/23 1005 Right basilic vein (medial side of arm) other (see comments) 11 days         Peripheral IV - Single Lumen 06/02/23 0030 20 G Anterior;Left Foot 4 days         Peripheral IV - Single Lumen 06/02/23 2200 20 G Anterior;Right Ankle 3 days                  Wounds: Yes  Wound care consulted: Yes

## 2023-06-06 NOTE — PROGRESS NOTES
[facility] - Neuro Critical Care  Neurocritical Care  Progress Note    Admit Date: 5/28/2023  Service Date: 06/06/2023  Length of Stay: 9    Subjective:     Chief Complaint: Non-convulsive status epilepticus    History of Present Illness: Marely Hamilton is a 58 year old female with a medical history significant for EtOH induced hepatic cirrhosis, seizure disorder on outpatient LEV and ZNS and bipolar disorder who was admitted to [facility] on 5/28 as a transfer from OSH for evaluation of persistent encephalopathy concerning for NCSE vs hepatic encephalopathy. History is obtained from chart as patient is unresponsive and unable to assist. Initially presented to [facility] on 5/18 for encephalopathy and thought to be multifactorial including UTI (Proteus mirabilis s/p CTX course), hypercalcemia 2/2 lithium toxicity (Lithium changed to Abilify per Psych), as well as hepatic encephalopathy secondary to medication non compliance. She was treated with lactulose and rifaximin with no improvement in her mental status. EEG showed generalized slowing as well as triphasic discharges in the left hemisphere. MRI Brain WO was unremarkable for acute neurologic change. Decision was made to transfer patient to [facility] for higher level of care and ongoing evaluation and management. On arrival, mental status exam has waxed and waned with patient being intermittently verbal and following commands. NH4 48.    NCC is consulted on hospital day 4 for admission after EEG showed frequent left hemispheric electrographic seizures lasting on average 10-30 seconds with prolonged seizures over 1.5 hours also noted. Per chart review, patient home dose of LEV increased from 1000mg to 1500mg BID, ZNS increased to 200mg. Vimpat 150mg BID added per General Neurology (had not been given prior to consult). On initial evaluation, patient is not responsive to voice or pain. Upward gaze noted. Decision made to transfer patient to [facility] for higher level of care and  airway watch.       Hospital Course: 06/02/2023 Tachycardic and hypotensive, transferred for development of mostly right hemispheric status, LOC exam remains poor  Intubated for airway protection, EEG has changed to mostly include triphasics with no definite seizure activity per Dr Boss, propofol stopped and AEDs simplified to keppra 1000mg bid only, wean off pressor, give colloids with crystalloid resuscitation  06/03/2023: remains on persistant significant pressor support despite adequate hydration, problems with third spacing and may have pulmonary hypertension as well, consider trial of stress steroids if hgb stabilizes  06/04/2023: levo down to 0.08mcg, start and advance TFs, vaso at 0.04U, ammonia has been stable, slight rise in tbili, check direct bili, hgb and plts improved, no clear source of bleeding, no source of bleeding on CT abdomen, consider superimposed hemolysis  06/05/2023 NAEON. Neurologic exam continues to improve, following commands in all extremities. Levo 0.02, weaning as able. Vaso discontinued, MAP>65. Daily SBT, monitor and hold TF at midnight for possible extubation tomorrow. Hemolysis work up ongoing, trending CBC. Continue CTX for SBP prophylaxis.   06/06/2023 Awake and following commands. SBT with low tidal volumes. Remains intubated for airway protection at this time with possible extubation tomorrow with continued neurologic improvement. Levo discontinued, maintaining SBP<65. Concern for hemolytic anemia related to autoimmune process vs hepatic dysfunction (elevated reticulocyte count and decreased haptoglobin).      Interval History: As above.     Review of Systems   Unable to perform ROS: Intubated     Objective:     Vitals:  Temp: 96.4 °F (35.8 °C)  Pulse: 78  Rhythm: normal sinus rhythm  BP: 136/72  MAP (mmHg): 95  Resp: (!) 21  SpO2: 96 %  Oxygen Concentration (%): 40  Vent Mode: SIMV  Set Rate: 15 BPM  Vt Set: 320 mL  Pressure Support: 8 cmH20  PEEP/CPAP: 5 cmH20  Peak Airway  Pressure: 20 cmH20  Mean Airway Pressure: 8.9 cmH20  Plateau Pressure: 0 cmH20    Temp  Min: 96.4 °F (35.8 °C)  Max: 98.4 °F (36.9 °C)  Pulse  Min: 76  Max: 102  BP  Min: 113/53  Max: 140/77  MAP (mmHg)  Min: 76  Max: 103  CI (L/min/m2)  Min: 2.8 L/min/m2  Max: 4.1 L/min/m2  SVI  Min: 38  Max: 52  SVV  Min: 4 %  Max: 4 %  Resp  Min: 8  Max: 37  SpO2  Min: 92 %  Max: 100 %  Oxygen Concentration (%)  Min: 40  Max: 50    06/05 0701 - 06/06 0700  In: 2433 [I.V.:218.6]  Out: 2870 [Urine:1020]   Unmeasured Output  Urine Occurrence: 0  Stool Occurrence: 1  Emesis Occurrence: 0  Pad Count: 2        Physical Exam  Vitals and nursing note reviewed.   Constitutional:       Comments: Opens eye spontaneously, following commands   HENT:      Head: Normocephalic and atraumatic.   Eyes:      Extraocular Movements: Extraocular movements intact.      Pupils: Pupils are equal, round, and reactive to light.   Cardiovascular:      Rate and Rhythm: Normal rate.   Pulmonary:      Comments:   Intubated and mechanically ventilated   SBT with low TV  Abdominal:      Palpations: Abdomen is soft.   Musculoskeletal:      Cervical back: Neck supple.   Skin:     General: Skin is warm.   Neurological:      Mental Status: She is alert.      Comments:   Alert, follows commands in all extremities  Intubated, unable to test orientation  Pupils equal, reactive  Face grossly symmetric   Moves all extremities antigravity and to commands          Medications:  ContinuousNORepinephrine bitartrate-D5W, Last Rate: Stopped (06/06/23 1214)  vasopressin, Last Rate: Stopped (06/05/23 0835)    ScheduledARIPiprazole, 5 mg, Daily  cefTRIAXone (ROCEPHIN) IVPB, 1 g, Q24H  folic acid, 1 mg, Daily  lactulose, 200 g, TID  levETIRAcetam (Keppra) IV (PEDS and ADULTS), 500 mg, Q12H  mupirocin, , BID  pantoprazole, 40 mg, BID  rifAXIMin, 550 mg, BID  sodium bicarbonate, 1,300 mg, BID  thiamine, 100 mg, Daily    PRNaluminum-magnesium hydroxide-simethicone, 30 mL, QID  PRN  dextrose 10%, 12.5 g, PRN  dextrose 10%, 25 g, PRN  dextrose, 15 g, PRN  dextrose, 30 g, PRN  fentaNYL, 50 mcg, Q1H PRN  glucagon (human recombinant), 1 mg, PRN  insulin aspart U-100, 1-10 Units, Q6H PRN  magnesium oxide, 800 mg, PRN  magnesium oxide, 800 mg, PRN  melatonin, 6 mg, Nightly PRN  naloxone, 0.02 mg, PRN  ondansetron, 4 mg, Q8H PRN  potassium bicarbonate, 35 mEq, PRN  potassium bicarbonate, 50 mEq, PRN  potassium bicarbonate, 60 mEq, PRN  potassium, sodium phosphates, 2 packet, PRN  potassium, sodium phosphates, 2 packet, PRN  potassium, sodium phosphates, 2 packet, PRN  simethicone, 1 tablet, QID PRN  sodium chloride 0.9%, 10 mL, Q8H PRN      Today I personally reviewed pertinent medications, lines/drains/airways, imaging, laboratory results, microbiology results, notably:    CBC, CMP, Mg, Phos, ABG    Diet  Diet NPO  Diet NPO          Assessment/Plan:     Neuro  * Non-convulsive status epilepticus  58F with EtOH induced hepatic cirrhosis, seizure disorder on outpatient LEV and ZNS and bipolar disorder admitted on 5/28 for persistent encephalopathy.    - UTI on admit (Proteus mirabilis), now s/p CTX course  - Hypercalcemia 2/2 lithium toxicity (Lithium changed to Abilify per Psych)  - Hepatic encephalopathy 2/2 to medication non compliance, treated with lactulose and rifaximin    MRI Brain WO was unremarkable for acute neurologic change  NH4 48.  EEG w/ frequent left hemispheric electrographic seizures lasting on average 10-30 seconds with prolonged seizures over 1.5 hours also noted.   Repeat EEGs without seizure activity x 24 hours, now resolved    - Admit to [facility] for airway watch   - Q1h neurochecks while in ICU  - Q1h vitals while in ICU  - HOB@30  - Keppra 500mg BID  - Thiamine  - MAP>65  - Daily CMP, Mag, Phos - replete electrolytes PRN  - Lipid panel, A1c, Coags reviewed  - Daily CBC, transfuse PRN  - Start SubQ Heparin in when clinically indicated, currently held due concern for GI bleeding    - PT/OT/SLP as appropriate  - CM/SW consult for dispo planning    Acute encephalopathy  Multifactorial   See Non-convulsive status epilepticus    Breakthrough seizure  See Non-convulsive status epilepticus    Psychiatric  Bipolar 1 disorder  See Non-convulsive status epilepticus    Alcohol abuse, in remission  See Non-convulsive status epilepticus    Pulmonary  Acute hypoxemic respiratory failure  Intubated today for airway protection  Difficult intubation due to far anterior airway    Cardiac/Vascular  Hypotension (arterial)  Unclear etiology  First suggestion was decreased intravascular volume plus splanchnic dilataion  Responded partially to aggressive crystalloid and colloid hydration  S/p stress dose steroids   No clear signs to suggest sepsis at present  Required pressor support, d/c'd 6/6    Renal/  Hypernatremia  Daily CMP  Improved with free water administration, RESOLVED     Hematology  Thrombocytopenia  Likely secondary to hepatic cirrhosis and development of splenomegaly  Has worsened and associated with hgb drop, transfuse to >50K  No signs of bleeding in GI tract or on CT, consider hemolysis    Oncology  Anemia  Chronic  Hemolysis work up pending, Hematology consulted  Trend CBC    Macrocytic anemia  Daily CBC    Endocrine  Hyperammonemia  Stable on current medical regimen   See Non-convulsive status epilepticus      GI  Acute liver failure without hepatic coma  GI/Hepatology following    Hepatic cirrhosis  Continue Lactulose and Rifaximin   Follow coags and fibrinogen q12 and cbc q6  SBP prophylaxis with ceftriaxone x7 days total        Hepatic encephalopathy  Continue lactulose   See Non-convulsive status epilepticus          The patient is being Prophylaxed for:  Venous Thromboembolism with: Mechanical  Stress Ulcer with: H2B  Ventilator Pneumonia with: chlorhexidine oral care    Activity Orders          Diet NPO: NPO starting at 06/01 1055    Turn patient starting at 05/28 6185    Progressive  Mobility Protocol (mobilize patient to their highest level of functioning at least twice daily) starting at 05/28 2000        Full Code    Cameron Yadav MD  Neurocritical Care  [facility] - Neuro Critical Care

## 2023-06-06 NOTE — ASSESSMENT & PLAN NOTE
Unclear etiology  First suggestion was decreased intravascular volume plus splanchnic dilataion  Responded partially to aggressive crystalloid and colloid hydration  S/p stress dose steroids   No clear signs to suggest sepsis at present  Required pressor support, d/c'd 6/6

## 2023-06-06 NOTE — SUBJECTIVE & OBJECTIVE
Oncology Treatment Plan:   [No matching plan found]    Medications:  Continuous Infusions:   NORepinephrine bitartrate-D5W Stopped (23 1214)    vasopressin Stopped (23 0835)     Scheduled Meds:   ARIPiprazole  5 mg Per NG tube Daily    cefTRIAXone (ROCEPHIN) IVPB  1 g Intravenous Q24H    folic acid  1 mg Per NG tube Daily    lactulose  200 g Rectal TID    levETIRAcetam (Keppra) IV (PEDS and ADULTS)  500 mg Intravenous Q12H    mupirocin   Nasal BID    pantoprazole  40 mg Intravenous BID    rifAXIMin  550 mg Per NG tube BID    sodium bicarbonate  1,300 mg Per NG tube BID    thiamine  100 mg Per NG tube Daily     PRN Meds:aluminum-magnesium hydroxide-simethicone, dextrose 10%, dextrose 10%, dextrose, dextrose, [] fentaNYL **FOLLOWED BY** fentaNYL, glucagon (human recombinant), insulin aspart U-100, magnesium oxide, magnesium oxide, melatonin, naloxone, ondansetron, potassium bicarbonate, potassium bicarbonate, potassium bicarbonate, potassium, sodium phosphates, potassium, sodium phosphates, potassium, sodium phosphates, simethicone, sodium chloride 0.9%     Review of patient's allergies indicates:   Allergen Reactions    Sulfa (sulfonamide antibiotics) Rash    Codeine Nausea And Vomiting        Past Medical History:   Diagnosis Date    Addiction to drug     Alcohol abuse     Alcohol abuse, in remission 6/15/2015    14.5 weeks ago; AA weekly    Anemia     Anxiety 6/15/2015    Behavioral problem     Bipolar disorder     Bipolar disorder in remission 6/15/2015    Cirrhosis, Laennec's 6/15/2015    Depression     Encounter for blood transfusion     Epistaxis 6/15/2015    Fatigue     Glaucoma     Hematuria     Hepatic encephalopathy 6/15/2015    Hepatic enlargement     History of psychiatric care     History of psychiatric hospitalization     History of seizure 6/15/2015    1    hx of intentional Tylenol overdose 6/15/2015    2005- situational and hx of bipolar    Hx of psychiatric care     Macrocytic  anemia 9/18/2015    6 units PRBC since June 2015    Jeana     Osteoarthritis     Other ascites 6/15/2015    Psychiatric exam requested by authority     Psychiatric problem     Psychosis 9/26/2019    Renal disorder     Seizures     Self-harming behavior     Suicide attempt     Therapy     Thrombocytopenia 6/15/2015     Past Surgical History:   Procedure Laterality Date    COSMETIC SURGERY      ESOPHAGOGASTRODUODENOSCOPY       Family History       Problem Relation (Age of Onset)    Alcohol abuse Father, Sister, Paternal Grandfather    Bipolar disorder Father    Hypertension Mother    Suicide Father          Tobacco Use    Smoking status: Never    Smokeless tobacco: Never   Substance and Sexual Activity    Alcohol use: Not Currently     Comment: hx of ETOH abuse with cirrhosis of liver    Drug use: No    Sexual activity: Not Currently       Review of Systems   Unable to perform ROS: Intubated   Objective:     Vital Signs (Most Recent):  Temp: 96.4 °F (35.8 °C) (06/06/23 1539)  Pulse: 78 (06/06/23 1539)  Resp: (!) 21 (06/06/23 1539)  BP: 136/72 (06/06/23 1500)  SpO2: 96 % (06/06/23 1539) Vital Signs (24h Range):  Temp:  [96.4 °F (35.8 °C)-98.4 °F (36.9 °C)] 96.4 °F (35.8 °C)  Pulse:  [] 78  Resp:  [8-37] 21  SpO2:  [92 %-100 %] 96 %  BP: (113-140)/(53-80) 136/72  Arterial Line BP: ()/(47-63) 134/60     Weight: 59.9 kg (132 lb)  Body mass index is 24.14 kg/m².  Body surface area is 1.62 meters squared.      Intake/Output Summary (Last 24 hours) at 6/6/2023 1630  Last data filed at 6/6/2023 1521  Gross per 24 hour   Intake 1119.58 ml   Output 3000 ml   Net -1880.42 ml        Physical Exam  Vitals reviewed.   Constitutional:       General: She is not in acute distress.     Appearance: She is well-developed. She is ill-appearing.   HENT:      Head: Normocephalic and atraumatic.      Right Ear: External ear normal.      Left Ear: External ear normal.      Nose: Nose normal.      Mouth/Throat:      Pharynx: No  oropharyngeal exudate.   Eyes:      General: No scleral icterus.     Extraocular Movements: Extraocular movements intact.      Conjunctiva/sclera: Conjunctivae normal.   Cardiovascular:      Rate and Rhythm: Normal rate and regular rhythm.      Heart sounds: Normal heart sounds.   Pulmonary:      Comments: Intubated  Abdominal:      General: Abdomen is flat. There is no distension.   Musculoskeletal:         General: No deformity. Normal range of motion.      Cervical back: Neck supple.   Skin:     General: Skin is warm and dry.   Neurological:      Mental Status: She is alert.        Significant Labs:   All pertinent labs from the last 24 hours have been reviewed.    Diagnostic Results:  I have reviewed all pertinent imaging results/findings within the past 24 hours.

## 2023-06-07 LAB
ALBUMIN SERPL BCP-MCNC: 2.4 G/DL (ref 3.5–5.2)
ALLENS TEST: ABNORMAL
ALP SERPL-CCNC: 140 U/L (ref 55–135)
ALT SERPL W/O P-5'-P-CCNC: 25 U/L (ref 10–44)
AMMONIA PLAS-SCNC: 56 UMOL/L (ref 10–50)
ANION GAP SERPL CALC-SCNC: 6 MMOL/L (ref 8–16)
ANISOCYTOSIS BLD QL SMEAR: SLIGHT
APTT PPP: 38 SEC (ref 21–32)
APTT PPP: 39.3 SEC (ref 21–32)
AST SERPL-CCNC: 34 U/L (ref 10–40)
BASOPHILS # BLD AUTO: 0.01 K/UL (ref 0–0.2)
BASOPHILS NFR BLD: 0.3 % (ref 0–1.9)
BILIRUB SERPL-MCNC: 3 MG/DL (ref 0.1–1)
BUN SERPL-MCNC: 12 MG/DL (ref 6–20)
CALCIUM SERPL-MCNC: 8 MG/DL (ref 8.7–10.5)
CHLORIDE SERPL-SCNC: 116 MMOL/L (ref 95–110)
CO2 SERPL-SCNC: 24 MMOL/L (ref 23–29)
CREAT SERPL-MCNC: 0.4 MG/DL (ref 0.5–1.4)
DELSYS: ABNORMAL
DIFFERENTIAL METHOD: ABNORMAL
EOSINOPHIL # BLD AUTO: 0.1 K/UL (ref 0–0.5)
EOSINOPHIL NFR BLD: 2.5 % (ref 0–8)
ERYTHROCYTE [DISTWIDTH] IN BLOOD BY AUTOMATED COUNT: 25.2 % (ref 11.5–14.5)
ERYTHROCYTE [SEDIMENTATION RATE] IN BLOOD BY WESTERGREN METHOD: 14 MM/H
EST. GFR  (NO RACE VARIABLE): >60 ML/MIN/1.73 M^2
FACT VIII ACT/NOR PPP: 179 % (ref 60–170)
FIBRINOGEN PPP-MCNC: 115 MG/DL (ref 182–400)
FIBRINOGEN PPP-MCNC: 120 MG/DL (ref 182–400)
FIO2: 40
GLUCOSE SERPL-MCNC: 97 MG/DL (ref 70–110)
HCO3 UR-SCNC: 23.4 MMOL/L (ref 24–28)
HCT VFR BLD AUTO: 25.8 % (ref 37–48.5)
HGB BLD-MCNC: 8.3 G/DL (ref 12–16)
HYPOCHROMIA BLD QL SMEAR: ABNORMAL
IMM GRANULOCYTES # BLD AUTO: 0.01 K/UL (ref 0–0.04)
IMM GRANULOCYTES NFR BLD AUTO: 0.3 % (ref 0–0.5)
INR PPP: 2.1 (ref 0.8–1.2)
LYMPHOCYTES # BLD AUTO: 0.6 K/UL (ref 1–4.8)
LYMPHOCYTES NFR BLD: 15.7 % (ref 18–48)
MAGNESIUM SERPL-MCNC: 1.7 MG/DL (ref 1.6–2.6)
MCH RBC QN AUTO: 31.8 PG (ref 27–31)
MCHC RBC AUTO-ENTMCNC: 32.2 G/DL (ref 32–36)
MCV RBC AUTO: 99 FL (ref 82–98)
MODE: ABNORMAL
MONOCYTES # BLD AUTO: 0.3 K/UL (ref 0.3–1)
MONOCYTES NFR BLD: 8 % (ref 4–15)
NEUTROPHILS # BLD AUTO: 2.7 K/UL (ref 1.8–7.7)
NEUTROPHILS NFR BLD: 73.2 % (ref 38–73)
NRBC BLD-RTO: 0 /100 WBC
OVALOCYTES BLD QL SMEAR: ABNORMAL
PATH REV BLD -IMP: NORMAL
PCO2 BLDA: 36.9 MMHG (ref 35–45)
PEEP: 5
PH SMN: 7.41 [PH] (ref 7.35–7.45)
PHOSPHATE SERPL-MCNC: 1.8 MG/DL (ref 2.7–4.5)
PLATELET # BLD AUTO: 44 K/UL (ref 150–450)
PLATELET BLD QL SMEAR: ABNORMAL
PMV BLD AUTO: 11.1 FL (ref 9.2–12.9)
PO2 BLDA: 85 MMHG (ref 80–100)
POC BE: -1 MMOL/L
POC SATURATED O2: 97 % (ref 95–100)
POC TCO2: 24 MMOL/L (ref 23–27)
POCT GLUCOSE: 133 MG/DL (ref 70–110)
POCT GLUCOSE: 84 MG/DL (ref 70–110)
POCT GLUCOSE: 84 MG/DL (ref 70–110)
POIKILOCYTOSIS BLD QL SMEAR: SLIGHT
POLYCHROMASIA BLD QL SMEAR: ABNORMAL
POTASSIUM SERPL-SCNC: 2.9 MMOL/L (ref 3.5–5.1)
PROT SERPL-MCNC: 4.3 G/DL (ref 6–8.4)
PROTHROMBIN TIME: 21.4 SEC (ref 9–12.5)
RBC # BLD AUTO: 2.61 M/UL (ref 4–5.4)
SAMPLE: ABNORMAL
SITE: ABNORMAL
SODIUM SERPL-SCNC: 146 MMOL/L (ref 136–145)
SPHEROCYTES BLD QL SMEAR: ABNORMAL
TOXIC GRANULES BLD QL SMEAR: PRESENT
VT: 320
WBC # BLD AUTO: 3.63 K/UL (ref 3.9–12.7)

## 2023-06-07 PROCEDURE — 80053 COMPREHEN METABOLIC PANEL: CPT | Performed by: PSYCHIATRY & NEUROLOGY

## 2023-06-07 PROCEDURE — 94761 N-INVAS EAR/PLS OXIMETRY MLT: CPT

## 2023-06-07 PROCEDURE — 85384 FIBRINOGEN ACTIVITY: CPT | Mod: 91 | Performed by: PSYCHIATRY & NEUROLOGY

## 2023-06-07 PROCEDURE — C9113 INJ PANTOPRAZOLE SODIUM, VIA: HCPCS | Performed by: STUDENT IN AN ORGANIZED HEALTH CARE EDUCATION/TRAINING PROGRAM

## 2023-06-07 PROCEDURE — 85730 THROMBOPLASTIN TIME PARTIAL: CPT | Performed by: PSYCHIATRY & NEUROLOGY

## 2023-06-07 PROCEDURE — 25000003 PHARM REV CODE 250: Performed by: NURSE PRACTITIONER

## 2023-06-07 PROCEDURE — 94003 VENT MGMT INPAT SUBQ DAY: CPT

## 2023-06-07 PROCEDURE — 63600175 PHARM REV CODE 636 W HCPCS: Performed by: PSYCHIATRY & NEUROLOGY

## 2023-06-07 PROCEDURE — 27000221 HC OXYGEN, UP TO 24 HOURS

## 2023-06-07 PROCEDURE — 25000003 PHARM REV CODE 250: Performed by: PHYSICIAN ASSISTANT

## 2023-06-07 PROCEDURE — 63600175 PHARM REV CODE 636 W HCPCS: Performed by: STUDENT IN AN ORGANIZED HEALTH CARE EDUCATION/TRAINING PROGRAM

## 2023-06-07 PROCEDURE — 99291 CRITICAL CARE FIRST HOUR: CPT | Mod: GC,,, | Performed by: PSYCHIATRY & NEUROLOGY

## 2023-06-07 PROCEDURE — 25000003 PHARM REV CODE 250: Performed by: PSYCHIATRY & NEUROLOGY

## 2023-06-07 PROCEDURE — 99900026 HC AIRWAY MAINTENANCE (STAT)

## 2023-06-07 PROCEDURE — 84100 ASSAY OF PHOSPHORUS: CPT | Performed by: PSYCHIATRY & NEUROLOGY

## 2023-06-07 PROCEDURE — 25000003 PHARM REV CODE 250: Performed by: STUDENT IN AN ORGANIZED HEALTH CARE EDUCATION/TRAINING PROGRAM

## 2023-06-07 PROCEDURE — 82803 BLOOD GASES ANY COMBINATION: CPT

## 2023-06-07 PROCEDURE — 25000003 PHARM REV CODE 250

## 2023-06-07 PROCEDURE — 20000000 HC ICU ROOM

## 2023-06-07 PROCEDURE — 37799 UNLISTED PX VASCULAR SURGERY: CPT

## 2023-06-07 PROCEDURE — 99900035 HC TECH TIME PER 15 MIN (STAT)

## 2023-06-07 PROCEDURE — 83735 ASSAY OF MAGNESIUM: CPT | Performed by: PSYCHIATRY & NEUROLOGY

## 2023-06-07 PROCEDURE — 82140 ASSAY OF AMMONIA: CPT | Performed by: NURSE PRACTITIONER

## 2023-06-07 PROCEDURE — 85025 COMPLETE CBC W/AUTO DIFF WBC: CPT | Performed by: PSYCHIATRY & NEUROLOGY

## 2023-06-07 PROCEDURE — 99291 PR CRITICAL CARE, E/M 30-74 MINUTES: ICD-10-PCS | Mod: GC,,, | Performed by: PSYCHIATRY & NEUROLOGY

## 2023-06-07 PROCEDURE — 85610 PROTHROMBIN TIME: CPT | Performed by: NURSE PRACTITIONER

## 2023-06-07 RX ADMIN — FENTANYL CITRATE 50 MCG: 0.05 INJECTION, SOLUTION INTRAMUSCULAR; INTRAVENOUS at 01:06

## 2023-06-07 RX ADMIN — POTASSIUM BICARBONATE 60 MEQ: 391 TABLET, EFFERVESCENT ORAL at 09:06

## 2023-06-07 RX ADMIN — RIFAXIMIN 550 MG: 550 TABLET ORAL at 09:06

## 2023-06-07 RX ADMIN — LEVETIRACETAM 500 MG: 100 INJECTION, SOLUTION INTRAVENOUS at 09:06

## 2023-06-07 RX ADMIN — FENTANYL CITRATE 50 MCG: 0.05 INJECTION, SOLUTION INTRAMUSCULAR; INTRAVENOUS at 02:06

## 2023-06-07 RX ADMIN — FOLIC ACID 1 MG: 1 TABLET ORAL at 10:06

## 2023-06-07 RX ADMIN — LACTULOSE 200 G: 10 SOLUTION ORAL at 09:06

## 2023-06-07 RX ADMIN — LEVETIRACETAM 500 MG: 100 INJECTION, SOLUTION INTRAVENOUS at 10:06

## 2023-06-07 RX ADMIN — ARIPIPRAZOLE 5 MG: 5 TABLET ORAL at 10:06

## 2023-06-07 RX ADMIN — POTASSIUM PHOSPHATE, MONOBASIC AND POTASSIUM PHOSPHATE, DIBASIC 30 MMOL: 224; 236 INJECTION, SOLUTION, CONCENTRATE INTRAVENOUS at 07:06

## 2023-06-07 RX ADMIN — SODIUM BICARBONATE 1300 MG: 650 TABLET ORAL at 09:06

## 2023-06-07 RX ADMIN — Medication 100 MG: at 10:06

## 2023-06-07 RX ADMIN — LACTULOSE 200 G: 10 SOLUTION ORAL at 03:06

## 2023-06-07 RX ADMIN — SODIUM BICARBONATE 1300 MG: 650 TABLET ORAL at 10:06

## 2023-06-07 RX ADMIN — PANTOPRAZOLE SODIUM 40 MG: 40 INJECTION, POWDER, FOR SOLUTION INTRAVENOUS at 10:06

## 2023-06-07 RX ADMIN — FENTANYL CITRATE 50 MCG: 0.05 INJECTION, SOLUTION INTRAMUSCULAR; INTRAVENOUS at 08:06

## 2023-06-07 RX ADMIN — LACTULOSE 200 G: 10 SOLUTION ORAL at 10:06

## 2023-06-07 RX ADMIN — CEFTRIAXONE 1 G: 1 INJECTION, POWDER, FOR SOLUTION INTRAMUSCULAR; INTRAVENOUS at 02:06

## 2023-06-07 RX ADMIN — FENTANYL CITRATE 50 MCG: 0.05 INJECTION, SOLUTION INTRAMUSCULAR; INTRAVENOUS at 03:06

## 2023-06-07 RX ADMIN — PANTOPRAZOLE SODIUM 40 MG: 40 INJECTION, POWDER, FOR SOLUTION INTRAVENOUS at 09:06

## 2023-06-07 NOTE — PLAN OF CARE
[facility] Care Plan    POC reviewed with Marely Hamilton and family at 0300. Pt is intubated and unable to verbalize understanding. Questions and concerns addressed. No acute events overnight. Pt progressing toward goals. Will continue to monitor. See below and flowsheets for full assessment and VS info.     - Bath completed  - K, phos IV replacements ordered  - Fentanyl administered for vent discomfort/dyssynchrony  - Pt placed back on A/C since she was not pulling adequate TV   - BG at 0000 and 0600 was 84    Is this a stroke patient? no    Neuro:  Cheryl Coma Scale  Best Eye Response: 4-->(E4) spontaneous  Best Motor Response: 6-->(M6) obeys commands  Best Verbal Response: 1-->(V1) none  Cheryl Coma Scale Score: 11  Assessment Qualifiers: patient intubated  Pupil PERRLA: no     24hr Temp:  [96.4 °F (35.8 °C)-97.9 °F (36.6 °C)]     CV:   Rhythm: normal sinus rhythm, atrial rhythm  BP goals:   SBP < 220  MAP > 65    Resp:      Vent Mode: A/C  Set Rate: 14 BPM  Oxygen Concentration (%): 40  Vt Set: 320 mL  PEEP/CPAP: 5 cmH20  Pressure Support: 8 cmH20    Plan: wean to extubate    GI/:     Diet/Nutrition Received: NPO  Last Bowel Movement: 06/05/23  Voiding Characteristics: urethral catheter (bladder)    Intake/Output Summary (Last 24 hours) at 6/7/2023 0708  Last data filed at 6/7/2023 0601  Gross per 24 hour   Intake 1232.23 ml   Output 1720 ml   Net -487.77 ml     Unmeasured Output  Urine Occurrence: 0  Stool Occurrence: 1  Emesis Occurrence: 0  Pad Count: 2    Labs/Accuchecks:  Recent Labs   Lab 06/07/23 0153   WBC 3.63*   RBC 2.61*   HGB 8.3*   HCT 25.8*   PLT 44*      Recent Labs   Lab 06/07/23 0153   *   K 2.9*   CO2 24   *   BUN 12   CREATININE 0.4*   ALKPHOS 140*   ALT 25   AST 34   BILITOT 3.0*      Recent Labs   Lab 06/06/23 2034 06/07/23 0153   INR  --  2.1*   APTT 38.0*  --     No results for input(s): CPK, CPKMB, TROPONINI, MB in the last 168 hours.    Electrolytes: Electrolytes  replaced  Accuchecks: Q6H    Gtts:   NORepinephrine bitartrate-D5W Stopped (06/06/23 1214)    vasopressin Stopped (06/05/23 0835)       LDA/Wounds:  Lines/Drains/Airways       Central Venous Catheter Line  Duration             Percutaneous Central Line Insertion/Assessment - Triple Lumen  06/03/23 1345 Femoral Right 3 days              Drain  Duration                  NG/OG Tube 06/01/23 2200 Right nostril 5 days         Urethral Catheter 06/01/23 2101 Temperature probe 5 days         Rectal Tube 06/03/23 1300 3 days              Airway  Duration                  Airway - Non-Surgical 06/02/23 1118 Endotracheal Tube 4 days              Arterial Line  Duration             Arterial Line 06/02/23 1050 Left Radial 4 days              Peripheral Intravenous Line  Duration                  Midline Catheter Insertion/Assessment  - Single Lumen 05/25/23 1005 Right basilic vein (medial side of arm) other (see comments) 12 days         Peripheral IV - Single Lumen 06/02/23 2200 20 G Anterior;Right Ankle 4 days                  Wounds: Yes  Wound care consulted: Yes

## 2023-06-07 NOTE — SUBJECTIVE & OBJECTIVE
Interval History: As above.     Review of Systems   Unable to perform ROS: Intubated   Constitutional:  Negative for fever.     Objective:     Vitals:  Temp: 97.7 °F (36.5 °C)  Pulse: 97  Rhythm: normal sinus rhythm, atrial rhythm  BP: (!) 109/53  MAP (mmHg): 77  Resp: (!) 21  SpO2: (!) 94 %  Oxygen Concentration (%): 40  Vent Mode: Spont  Set Rate: 14 BPM  Vt Set: 320 mL  Pressure Support: 10 cmH20  PEEP/CPAP: 5 cmH20  Peak Airway Pressure: 15 cmH20  Mean Airway Pressure: 7.6 cmH20  Plateau Pressure: 0 cmH20    Temp  Min: 96.4 °F (35.8 °C)  Max: 97.9 °F (36.6 °C)  Pulse  Min: 75  Max: 99  BP  Min: 100/56  Max: 140/77  MAP (mmHg)  Min: 72  Max: 103  Resp  Min: 8  Max: 51  SpO2  Min: 92 %  Max: 100 %  Oxygen Concentration (%)  Min: 40  Max: 40    06/06 0701 - 06/07 0700  In: 1232.2 [I.V.:58.9]  Out: 1720 [Urine:1020]   Unmeasured Output  Urine Occurrence: 0  Stool Occurrence: 1  Emesis Occurrence: 0  Pad Count: 2        Physical Exam  Vitals and nursing note reviewed.   Constitutional:       Comments: Opens eye spontaneously, following commands   HENT:      Head: Normocephalic and atraumatic.   Eyes:      General: Scleral icterus present.      Extraocular Movements: Extraocular movements intact.      Pupils: Pupils are equal, round, and reactive to light.   Cardiovascular:      Rate and Rhythm: Normal rate.   Pulmonary:      Comments:   Intubated and mechanically ventilated   Abdominal:      Palpations: Abdomen is soft.   Musculoskeletal:      Cervical back: Neck supple.   Skin:     General: Skin is warm.   Neurological:      Mental Status: She is alert.      Comments:   Alert, Intubated, unable to test orientation  Follows commands x4  Pupils equal, reactive  Face grossly symmetric   Moves all extremities antigravity and to commands          Medications:  Continuous   ScheduledARIPiprazole, 5 mg, Daily  cefTRIAXone (ROCEPHIN) IVPB, 1 g, Q24H  folic acid, 1 mg, Daily  lactulose, 200 g, TID  levETIRAcetam (Keppra) IV  (PEDS and ADULTS), 500 mg, Q12H  pantoprazole, 40 mg, BID  potassium phosphate IVPB, 30 mmol, Once  rifAXIMin, 550 mg, BID  sodium bicarbonate, 1,300 mg, BID  thiamine, 100 mg, Daily    PRNaluminum-magnesium hydroxide-simethicone, 30 mL, QID PRN  dextrose 10%, 12.5 g, PRN  dextrose 10%, 25 g, PRN  dextrose, 15 g, PRN  dextrose, 30 g, PRN  fentaNYL, 50 mcg, Q1H PRN  glucagon (human recombinant), 1 mg, PRN  insulin aspart U-100, 1-10 Units, Q6H PRN  magnesium oxide, 800 mg, PRN  magnesium oxide, 800 mg, PRN  melatonin, 6 mg, Nightly PRN  naloxone, 0.02 mg, PRN  ondansetron, 4 mg, Q8H PRN  potassium bicarbonate, 35 mEq, PRN  potassium bicarbonate, 50 mEq, PRN  potassium bicarbonate, 60 mEq, PRN  potassium, sodium phosphates, 2 packet, PRN  potassium, sodium phosphates, 2 packet, PRN  potassium, sodium phosphates, 2 packet, PRN  simethicone, 1 tablet, QID PRN  sodium chloride 0.9%, 10 mL, Q8H PRN      Today I personally reviewed pertinent medications, lines/drains/airways, imaging, laboratory results, microbiology results, notably:    CBC, CMP, Mg, Phos, ABG    Diet  Diet NPO  Diet NPO

## 2023-06-07 NOTE — ASSESSMENT & PLAN NOTE
58F with EtOH induced hepatic cirrhosis, seizure disorder on outpatient LEV and ZNS and bipolar disorder admitted on 5/28 for persistent encephalopathy.    - UTI on admit (Proteus mirabilis), now s/p CTX course  - Hypercalcemia 2/2 lithium toxicity (Lithium changed to Abilify per Psych)  - Hepatic encephalopathy 2/2 to medication non compliance, treated with lactulose and rifaximin     MRI Brain WO was unremarkable for acute neurologic change  NH4 48.  EEG w/ frequent left hemispheric electrographic seizures lasting on average 10-30 seconds with prolonged seizures over 1.5 hours also noted.   Repeat EEGs without seizure activity x 24 hours, now resolved    - Admit to [facility] for airway watch   - Q1h neurochecks while in ICU  - Q1h vitals while in ICU  - HOB@30  - Keppra 500mg BID  - Thiamine replacement   - MAP>65  - Daily CMP, Mag, Phos - replete electrolytes PRN  - Lipid panel, A1c, Coags reviewed  - Daily CBC, transfuse PRN  - Start SubQ Heparin in when clinically indicated, currently held due thrombocytopenia   - PT/OT/SLP as appropriate  - CM/SW consult for dispo planning

## 2023-06-07 NOTE — PROGRESS NOTES
[facility] Neuro Critical Care  Neurocritical Care  Progress Note    Admit Date: 5/28/2023  Service Date: 06/07/2023  Length of Stay: 10    Subjective:     Chief Complaint: Non-convulsive status epilepticus    History of Present Illness: Marely Hamilton is a 58 year old female with a medical history significant for EtOH induced hepatic cirrhosis, seizure disorder on outpatient LEV and ZNS and bipolar disorder who was admitted to [facility] on 5/28 as a transfer from OSH for evaluation of persistent encephalopathy concerning for NCSE vs hepatic encephalopathy. History is obtained from chart as patient is unresponsive and unable to assist. Initially presented to [facility] on 5/18 for encephalopathy and thought to be multifactorial including UTI (Proteus mirabilis s/p CTX course), hypercalcemia 2/2 lithium toxicity (Lithium changed to Abilify per Psych), as well as hepatic encephalopathy secondary to medication non compliance. She was treated with lactulose and rifaximin with no improvement in her mental status. EEG showed generalized slowing as well as triphasic discharges in the left hemisphere. MRI Brain WO was unremarkable for acute neurologic change. Decision was made to transfer patient to [facility] for higher level of care and ongoing evaluation and management. On arrival, mental status exam has waxed and waned with patient being intermittently verbal and following commands. NH4 48.    NCC is consulted on hospital day 4 for admission after EEG showed frequent left hemispheric electrographic seizures lasting on average 10-30 seconds with prolonged seizures over 1.5 hours also noted. Per chart review, patient home dose of LEV increased from 1000mg to 1500mg BID, ZNS increased to 200mg. Vimpat 150mg BID added per General Neurology (had not been given prior to consult). On initial evaluation, patient is not responsive to voice or pain. Upward gaze noted. Decision made to transfer patient to [facility] for higher level of care and  airway watch.       Hospital Course: 06/02/2023 Tachycardic and hypotensive, transferred for development of mostly right hemispheric status, LOC exam remains poor  Intubated for airway protection, EEG has changed to mostly include triphasics with no definite seizure activity per Dr Boss, propofol stopped and AEDs simplified to keppra 1000mg bid only, wean off pressor, give colloids with crystalloid resuscitation  06/03/2023: remains on persistant significant pressor support despite adequate hydration, problems with third spacing and may have pulmonary hypertension as well, consider trial of stress steroids if hgb stabilizes  06/04/2023: levo down to 0.08mcg, start and advance TFs, vaso at 0.04U, ammonia has been stable, slight rise in tbili, check direct bili, hgb and plts improved, no clear source of bleeding, no source of bleeding on CT abdomen, consider superimposed hemolysis  06/05/2023 NAEON. Neurologic exam continues to improve, following commands in all extremities. Levo 0.02, weaning as able. Vaso discontinued, MAP>65. Daily SBT, monitor and hold TF at midnight for possible extubation tomorrow. Hemolysis work up ongoing, trending CBC. Continue CTX for SBP prophylaxis.   06/06/2023 Awake and following commands. SBT with low tidal volumes. Remains intubated for airway protection at this time with possible extubation tomorrow with continued neurologic improvement. Levo discontinued, maintaining SBP<65. Concern for hemolytic anemia related to autoimmune process vs hepatic dysfunction (elevated reticulocyte count and decreased haptoglobin).  06/07/2023 Rested on AC overnight due to low TV and fatigue. SBT with pressure support 10/5 this am, weaning ventilator as tolerated. Continue tube feeds with goal to optimize nutrition. Ammonia elevated, continue lactulose to encourage bowel movement.       Interval History: As above.     Review of Systems   Unable to perform ROS: Intubated   Constitutional:  Negative for  fever.     Objective:     Vitals:  Temp: 97.7 °F (36.5 °C)  Pulse: 97  Rhythm: normal sinus rhythm, atrial rhythm  BP: (!) 109/53  MAP (mmHg): 77  Resp: (!) 21  SpO2: (!) 94 %  Oxygen Concentration (%): 40  Vent Mode: Spont  Set Rate: 14 BPM  Vt Set: 320 mL  Pressure Support: 10 cmH20  PEEP/CPAP: 5 cmH20  Peak Airway Pressure: 15 cmH20  Mean Airway Pressure: 7.6 cmH20  Plateau Pressure: 0 cmH20    Temp  Min: 96.4 °F (35.8 °C)  Max: 97.9 °F (36.6 °C)  Pulse  Min: 75  Max: 99  BP  Min: 100/56  Max: 140/77  MAP (mmHg)  Min: 72  Max: 103  Resp  Min: 8  Max: 51  SpO2  Min: 92 %  Max: 100 %  Oxygen Concentration (%)  Min: 40  Max: 40    06/06 0701 - 06/07 0700  In: 1232.2 [I.V.:58.9]  Out: 1720 [Urine:1020]   Unmeasured Output  Urine Occurrence: 0  Stool Occurrence: 1  Emesis Occurrence: 0  Pad Count: 2        Physical Exam  Vitals and nursing note reviewed.   Constitutional:       Comments: Opens eye spontaneously, following commands   HENT:      Head: Normocephalic and atraumatic.   Eyes:      General: Scleral icterus present.      Extraocular Movements: Extraocular movements intact.      Pupils: Pupils are equal, round, and reactive to light.   Cardiovascular:      Rate and Rhythm: Normal rate.   Pulmonary:      Comments:   Intubated and mechanically ventilated   Abdominal:      Palpations: Abdomen is soft.   Musculoskeletal:      Cervical back: Neck supple.   Skin:     General: Skin is warm.   Neurological:      Mental Status: She is alert.      Comments:   Alert, Intubated, unable to test orientation  Follows commands x4  Pupils equal, reactive  Face grossly symmetric   Moves all extremities antigravity and to commands          Medications:  Continuous   ScheduledARIPiprazole, 5 mg, Daily  cefTRIAXone (ROCEPHIN) IVPB, 1 g, Q24H  folic acid, 1 mg, Daily  lactulose, 200 g, TID  levETIRAcetam (Keppra) IV (PEDS and ADULTS), 500 mg, Q12H  pantoprazole, 40 mg, BID  potassium phosphate IVPB, 30 mmol, Once  rifAXIMin, 550  mg, BID  sodium bicarbonate, 1,300 mg, BID  thiamine, 100 mg, Daily    PRNaluminum-magnesium hydroxide-simethicone, 30 mL, QID PRN  dextrose 10%, 12.5 g, PRN  dextrose 10%, 25 g, PRN  dextrose, 15 g, PRN  dextrose, 30 g, PRN  fentaNYL, 50 mcg, Q1H PRN  glucagon (human recombinant), 1 mg, PRN  insulin aspart U-100, 1-10 Units, Q6H PRN  magnesium oxide, 800 mg, PRN  magnesium oxide, 800 mg, PRN  melatonin, 6 mg, Nightly PRN  naloxone, 0.02 mg, PRN  ondansetron, 4 mg, Q8H PRN  potassium bicarbonate, 35 mEq, PRN  potassium bicarbonate, 50 mEq, PRN  potassium bicarbonate, 60 mEq, PRN  potassium, sodium phosphates, 2 packet, PRN  potassium, sodium phosphates, 2 packet, PRN  potassium, sodium phosphates, 2 packet, PRN  simethicone, 1 tablet, QID PRN  sodium chloride 0.9%, 10 mL, Q8H PRN      Today I personally reviewed pertinent medications, lines/drains/airways, imaging, laboratory results, microbiology results, notably:    CBC, CMP, Mg, Phos, ABG    Diet  Diet NPO  Diet NPO          Assessment/Plan:     Neuro  * Non-convulsive status epilepticus  58F with EtOH induced hepatic cirrhosis, seizure disorder on outpatient LEV and ZNS and bipolar disorder admitted on 5/28 for persistent encephalopathy.    - UTI on admit (Proteus mirabilis), now s/p CTX course  - Hypercalcemia 2/2 lithium toxicity (Lithium changed to Abilify per Psych)  - Hepatic encephalopathy 2/2 to medication non compliance, treated with lactulose and rifaximin     MRI Brain WO was unremarkable for acute neurologic change  NH4 48.  EEG w/ frequent left hemispheric electrographic seizures lasting on average 10-30 seconds with prolonged seizures over 1.5 hours also noted.   Repeat EEGs without seizure activity x 24 hours, now resolved    - Admit to [facility] for airway watch   - Q1h neurochecks while in ICU  - Q1h vitals while in ICU  - HOB@30  - Keppra 500mg BID  - Thiamine replacement   - MAP>65  - Daily CMP, Mag, Phos - replete electrolytes PRN  - Lipid panel,  A1c, Coags reviewed  - Daily CBC, transfuse PRN  - Start SubQ Heparin in when clinically indicated, currently held due thrombocytopenia   - PT/OT/SLP as appropriate  - CM/SW consult for dispo planning    Acute encephalopathy  Multifactorial   See Non-convulsive status epilepticus    Breakthrough seizure  See Non-convulsive status epilepticus    Psychiatric  Bipolar 1 disorder  See Non-convulsive status epilepticus    Alcohol abuse, in remission  See Non-convulsive status epilepticus    Pulmonary  Acute hypoxemic respiratory failure  Intubated today for airway protection  Difficult intubation due to far anterior airway    Cardiac/Vascular  Hypotension (arterial)  Unclear etiology  First suggestion was decreased intravascular volume plus splanchnic dilataion  Responded partially to aggressive crystalloid and colloid hydration  S/p stress dose steroids   No clear signs to suggest sepsis at present  Required pressor support, d/c'd 6/6    Renal/  Hypernatremia  Daily CMP  Improved with free water administration, RESOLVED     Hematology  Thrombocytopenia  Likely secondary to hepatic cirrhosis and development of splenomegaly  Has worsened and associated with hgb drop, transfuse to >50K  No signs of bleeding in GI tract or on CT, hemolysis on labs  Hematology consulted     Oncology  Anemia  Chronic  Hemolysis work up pending, Hematology consulted  Trend CBC    Macrocytic anemia  Daily CBC    Endocrine  Hyperammonemia  Stable on current medical regimen   See Non-convulsive status epilepticus      GI  Acute liver failure without hepatic coma  GI/Hepatology following    Hepatic cirrhosis  Continue Lactulose and Rifaximin   Ammonia elevated, start TF   Follow coags and fibrinogen  SBP prophylaxis with ceftriaxone x7 days total    Hepatic encephalopathy  Continue lactulose   See Non-convulsive status epilepticus          The patient is being Prophylaxed for:  Venous Thromboembolism with: Mechanical  Stress Ulcer with:  H2B  Ventilator Pneumonia with: chlorhexidine oral care    Activity Orders          Diet NPO: NPO starting at 06/01 1055    Turn patient starting at 05/28 6781    Progressive Mobility Protocol (mobilize patient to their highest level of functioning at least twice daily) starting at 05/28 2000        Full Code    Cameron Yadav MD  Neurocritical Care  [facility] - Neuro Critical Care

## 2023-06-07 NOTE — PLAN OF CARE
[facility] Care Plan    POC reviewed with Marely Hamilton and family at 1400. Pt not able to verbalize understanding. Questions and concerns addressed. No acute events today. Pt progressing toward goals. Will continue to monitor. See below and flowsheets for full assessment and VS info.             Is this a stroke patient? yes- Stroke booklet reviewed with patient, risk factors identified for patient and stroke booklet remains at bedside for ongoing education.     Neuro:  [place] Coma Scale  Best Eye Response: 4-->(E4) spontaneous  Best Motor Response: 6-->(M6) obeys commands  Best Verbal Response: 1-->(V1) none  [place] Coma Scale Score: 11  Assessment Qualifiers: patient intubated  Pupil PERRLA: no     24 hr Temp:  [96.4 °F (35.8 °C)-98.4 °F (36.9 °C)]     CV:   Rhythm: normal sinus rhythm  BP goals:   SBP < 180  MAP > 65    Resp:      Vent Mode: SIMV  Set Rate: 14 BPM  Oxygen Concentration (%): 40  Vt Set: 320 mL  PEEP/CPAP: 5 cmH20  Pressure Support: 10 cmH20    Plan: N/A    GI/:     Diet/Nutrition Received: tube feeding  Last Bowel Movement: 06/05/23  Voiding Characteristics: ureteral catheter    Intake/Output Summary (Last 24 hours) at 6/7/2023 1638  Last data filed at 6/7/2023 1500  Gross per 24 hour   Intake 1725.08 ml   Output 1470 ml   Net 255.08 ml     Unmeasured Output  Urine Occurrence: 0  Stool Occurrence: 1  Emesis Occurrence: 0  Pad Count: 2    Labs/Accuchecks:  Recent Labs   Lab 06/07/23  0153   WBC 3.63*   RBC 2.61*   HGB 8.3*   HCT 25.8*   PLT 44*      Recent Labs   Lab 06/07/23  0153   *   K 2.9*   CO2 24   *   BUN 12   CREATININE 0.4*   ALKPHOS 140*   ALT 25   AST 34   BILITOT 3.0*      Recent Labs   Lab 06/07/23 0153 06/07/23 0757   INR 2.1*  --    APTT  --  39.3*    No results for input(s): CPK, CPKMB, TROPONINI, MB in the last 168 hours.    Electrolytes: Electrolytes replaced  Accuchecks: Q6H    Gtts:      LDA/Wounds:  Lines/Drains/Airways       Drain  Duration                   NG/OG Tube 06/01/23 2200 Right nostril 5 days         Urethral Catheter 06/01/23 2101 Temperature probe 5 days         Rectal Tube 06/03/23 1300 4 days              Airway  Duration                  Airway - Non-Surgical 06/02/23 1118 Endotracheal Tube 5 days              Arterial Line  Duration             Arterial Line 06/02/23 1050 Left Radial 5 days              Peripheral Intravenous Line  Duration                  Midline Catheter Insertion/Assessment  - Single Lumen 05/25/23 1005 Right basilic vein (medial side of arm) other (see comments) 13 days         Peripheral IV - Single Lumen 06/07/23 1611 20 G Anterior;Left Hand <1 day         Peripheral IV - Single Lumen 06/07/23 1611 20 G Anterior;Left Upper Arm <1 day                  Wounds: Yes  Wound care consulted: Yes

## 2023-06-07 NOTE — PLAN OF CARE
[facility] Care Plan    POC reviewed with Marely Hamilton and family at 1400. Pt not able to verbalize understanding. Questions and concerns addressed. No acute events today. Pt progressing toward goals. Will continue to monitor. See below and flowsheets for full assessment and VS info.             Is this a stroke patient? yes- Stroke booklet reviewed with patient and family, risk factors identified for patient and stroke booklet remains at bedside for ongoing education.     Neuro:  [place]heryl Coma Scale  Best Eye Response: 3-->(E3) to speech  Best Motor Response: 6-->(M6) obeys commands  Best Verbal Response: 1-->(V1) none  [place] Coma Scale Score: 10  Assessment Qualifiers: patient intubated  Pupil PERRLA: no     24 hr Temp:  [96.4 °F (35.8 °C)-98.4 °F (36.9 °C)]     CV:   Rhythm: normal sinus rhythm  BP goals:   SBP < 180  MAP > 65    Resp:      Vent Mode: SIMV  Set Rate: 15 BPM  Oxygen Concentration (%): 40  Vt Set: 320 mL  PEEP/CPAP: 5 cmH20  Pressure Support: 8 cmH20    Plan: N/A    GI/:     Diet/Nutrition Received: NPO  Last Bowel Movement: 06/05/23  Voiding Characteristics: ureteral catheter    Intake/Output Summary (Last 24 hours) at 6/6/2023 1941  Last data filed at 6/6/2023 1812  Gross per 24 hour   Intake 1085.86 ml   Output 1780 ml   Net -694.14 ml     Unmeasured Output  Urine Occurrence: 0  Stool Occurrence: 1  Emesis Occurrence: 0  Pad Count: 2    Labs/Accuchecks:  Recent Labs   Lab 06/06/23  1537   WBC 6.35   RBC 2.80*   HGB 8.9*   HCT 28.1*   PLT 63*      Recent Labs   Lab 06/06/23  0106 06/06/23  1204    144   K 2.7* 2.9*   CO2 24 24   * 115*   BUN 16 13   CREATININE 0.5 0.5   ALKPHOS 142*  --    ALT 29  --    AST 31  --    BILITOT 2.3*  --       Recent Labs   Lab 06/06/23  0106 06/06/23  0830   INR 1.9*  --    APTT  --  39.8*    No results for input(s): CPK, CPKMB, TROPONINI, MB in the last 168 hours.    Electrolytes: Electrolytes replaced  Accuchecks: Q6H    Gtts:   NORepinephrine  bitartrate-D5W Stopped (06/06/23 1214)    vasopressin Stopped (06/05/23 0835)       LDA/Wounds:  Lines/Drains/Airways       Central Venous Catheter Line  Duration             Percutaneous Central Line Insertion/Assessment - Triple Lumen  06/03/23 1345 Femoral Right 3 days              Drain  Duration                  NG/OG Tube 06/01/23 2200 Right nostril 4 days         Urethral Catheter 06/01/23 2101 Temperature probe 4 days         Rectal Tube 06/03/23 1300 3 days              Airway  Duration                  Airway - Non-Surgical 06/02/23 1118 Endotracheal Tube 4 days              Arterial Line  Duration             Arterial Line 06/02/23 1050 Left Radial 4 days              Peripheral Intravenous Line  Duration                  Midline Catheter Insertion/Assessment  - Single Lumen 05/25/23 1005 Right basilic vein (medial side of arm) other (see comments) 12 days         Peripheral IV - Single Lumen 06/02/23 0030 20 G Anterior;Left Foot 4 days         Peripheral IV - Single Lumen 06/02/23 2200 20 G Anterior;Right Ankle 3 days                  Wounds: Yes  Wound care consulted: Yes

## 2023-06-07 NOTE — ASSESSMENT & PLAN NOTE
Continue Lactulose and Rifaximin   Ammonia elevated, start TF   Follow coags and fibrinogen  SBP prophylaxis with ceftriaxone x7 days total

## 2023-06-07 NOTE — ASSESSMENT & PLAN NOTE
Likely secondary to hepatic cirrhosis and development of splenomegaly  Has worsened and associated with hgb drop, transfuse to >50K  No signs of bleeding in GI tract or on CT, hemolysis on labs  Hematology consulted

## 2023-06-08 LAB
ALBUMIN SERPL BCP-MCNC: 2.2 G/DL (ref 3.5–5.2)
ALLENS TEST: ABNORMAL
ALP SERPL-CCNC: 136 U/L (ref 55–135)
ALT SERPL W/O P-5'-P-CCNC: 27 U/L (ref 10–44)
AMMONIA PLAS-SCNC: 44 UMOL/L (ref 10–50)
ANION GAP SERPL CALC-SCNC: 6 MMOL/L (ref 8–16)
APTT PPP: 39.3 SEC (ref 21–32)
APTT PPP: 39.6 SEC (ref 21–32)
AST SERPL-CCNC: 38 U/L (ref 10–40)
BASOPHILS # BLD AUTO: 0.01 K/UL (ref 0–0.2)
BASOPHILS # BLD AUTO: 0.02 K/UL (ref 0–0.2)
BASOPHILS NFR BLD: 0.3 % (ref 0–1.9)
BASOPHILS NFR BLD: 0.3 % (ref 0–1.9)
BILIRUB SERPL-MCNC: 3.3 MG/DL (ref 0.1–1)
BUN SERPL-MCNC: 10 MG/DL (ref 6–20)
CALCIUM SERPL-MCNC: 7.9 MG/DL (ref 8.7–10.5)
CHLORIDE SERPL-SCNC: 116 MMOL/L (ref 95–110)
CO2 SERPL-SCNC: 24 MMOL/L (ref 23–29)
CREAT SERPL-MCNC: 0.4 MG/DL (ref 0.5–1.4)
DELSYS: ABNORMAL
DIFFERENTIAL METHOD: ABNORMAL
DIFFERENTIAL METHOD: ABNORMAL
EOSINOPHIL # BLD AUTO: 0.1 K/UL (ref 0–0.5)
EOSINOPHIL # BLD AUTO: 0.1 K/UL (ref 0–0.5)
EOSINOPHIL NFR BLD: 2.2 % (ref 0–8)
EOSINOPHIL NFR BLD: 2.8 % (ref 0–8)
ERYTHROCYTE [DISTWIDTH] IN BLOOD BY AUTOMATED COUNT: 25.8 % (ref 11.5–14.5)
ERYTHROCYTE [DISTWIDTH] IN BLOOD BY AUTOMATED COUNT: 26.2 % (ref 11.5–14.5)
ERYTHROCYTE [SEDIMENTATION RATE] IN BLOOD BY WESTERGREN METHOD: 14 MM/H
EST. GFR  (NO RACE VARIABLE): >60 ML/MIN/1.73 M^2
FIBRINOGEN PPP-MCNC: 105 MG/DL (ref 182–400)
FIBRINOGEN PPP-MCNC: 119 MG/DL (ref 182–400)
FIO2: 40
GLUCOSE SERPL-MCNC: 127 MG/DL (ref 70–110)
HCO3 UR-SCNC: 25.7 MMOL/L (ref 24–28)
HCT VFR BLD AUTO: 25.7 % (ref 37–48.5)
HCT VFR BLD AUTO: 29.2 % (ref 37–48.5)
HGB BLD-MCNC: 8 G/DL (ref 12–16)
HGB BLD-MCNC: 9.4 G/DL (ref 12–16)
IMM GRANULOCYTES # BLD AUTO: 0.02 K/UL (ref 0–0.04)
IMM GRANULOCYTES # BLD AUTO: 0.02 K/UL (ref 0–0.04)
IMM GRANULOCYTES NFR BLD AUTO: 0.3 % (ref 0–0.5)
IMM GRANULOCYTES NFR BLD AUTO: 0.5 % (ref 0–0.5)
INR PPP: 2.2 (ref 0.8–1.2)
INR PPP: 2.2 (ref 0.8–1.2)
LYMPHOCYTES # BLD AUTO: 0.8 K/UL (ref 1–4.8)
LYMPHOCYTES # BLD AUTO: 1.3 K/UL (ref 1–4.8)
LYMPHOCYTES NFR BLD: 20 % (ref 18–48)
LYMPHOCYTES NFR BLD: 22.4 % (ref 18–48)
MAGNESIUM SERPL-MCNC: 1.6 MG/DL (ref 1.6–2.6)
MCH RBC QN AUTO: 31.4 PG (ref 27–31)
MCH RBC QN AUTO: 32 PG (ref 27–31)
MCHC RBC AUTO-ENTMCNC: 31.1 G/DL (ref 32–36)
MCHC RBC AUTO-ENTMCNC: 32.2 G/DL (ref 32–36)
MCV RBC AUTO: 101 FL (ref 82–98)
MCV RBC AUTO: 99 FL (ref 82–98)
MODE: ABNORMAL
MONOCYTES # BLD AUTO: 0.4 K/UL (ref 0.3–1)
MONOCYTES # BLD AUTO: 0.6 K/UL (ref 0.3–1)
MONOCYTES NFR BLD: 10.3 % (ref 4–15)
MONOCYTES NFR BLD: 10.7 % (ref 4–15)
NEUTROPHILS # BLD AUTO: 2.6 K/UL (ref 1.8–7.7)
NEUTROPHILS # BLD AUTO: 3.7 K/UL (ref 1.8–7.7)
NEUTROPHILS NFR BLD: 64.1 % (ref 38–73)
NEUTROPHILS NFR BLD: 66.1 % (ref 38–73)
NRBC BLD-RTO: 0 /100 WBC
NRBC BLD-RTO: 0 /100 WBC
PCO2 BLDA: 30.3 MMHG (ref 35–45)
PEEP: 5
PH SMN: 7.54 [PH] (ref 7.35–7.45)
PHOSPHATE SERPL-MCNC: 2.3 MG/DL (ref 2.7–4.5)
PIP: 15
PLATELET # BLD AUTO: 50 K/UL (ref 150–450)
PLATELET # BLD AUTO: 53 K/UL (ref 150–450)
PMV BLD AUTO: 10.5 FL (ref 9.2–12.9)
PMV BLD AUTO: 11.5 FL (ref 9.2–12.9)
PO2 BLDA: 91 MMHG (ref 80–100)
POC BE: 3 MMOL/L
POC SATURATED O2: 98 % (ref 95–100)
POC TCO2: 27 MMOL/L (ref 23–27)
POCT GLUCOSE: 134 MG/DL (ref 70–110)
POCT GLUCOSE: 146 MG/DL (ref 70–110)
POTASSIUM SERPL-SCNC: 3.9 MMOL/L (ref 3.5–5.1)
PROT SERPL-MCNC: 4.1 G/DL (ref 6–8.4)
PROTHROMBIN TIME: 22.7 SEC (ref 9–12.5)
PROTHROMBIN TIME: 22.8 SEC (ref 9–12.5)
PS: 10
RBC # BLD AUTO: 2.55 M/UL (ref 4–5.4)
RBC # BLD AUTO: 2.94 M/UL (ref 4–5.4)
SAMPLE: ABNORMAL
SITE: ABNORMAL
SODIUM SERPL-SCNC: 146 MMOL/L (ref 136–145)
SP02: 96
WBC # BLD AUTO: 3.9 K/UL (ref 3.9–12.7)
WBC # BLD AUTO: 5.8 K/UL (ref 3.9–12.7)

## 2023-06-08 PROCEDURE — 85025 COMPLETE CBC W/AUTO DIFF WBC: CPT | Performed by: PSYCHIATRY & NEUROLOGY

## 2023-06-08 PROCEDURE — C9113 INJ PANTOPRAZOLE SODIUM, VIA: HCPCS | Performed by: STUDENT IN AN ORGANIZED HEALTH CARE EDUCATION/TRAINING PROGRAM

## 2023-06-08 PROCEDURE — 82140 ASSAY OF AMMONIA: CPT | Performed by: NURSE PRACTITIONER

## 2023-06-08 PROCEDURE — 84100 ASSAY OF PHOSPHORUS: CPT | Performed by: PSYCHIATRY & NEUROLOGY

## 2023-06-08 PROCEDURE — 25000003 PHARM REV CODE 250: Performed by: NURSE PRACTITIONER

## 2023-06-08 PROCEDURE — 99900035 HC TECH TIME PER 15 MIN (STAT)

## 2023-06-08 PROCEDURE — 83735 ASSAY OF MAGNESIUM: CPT | Performed by: PSYCHIATRY & NEUROLOGY

## 2023-06-08 PROCEDURE — 99900026 HC AIRWAY MAINTENANCE (STAT)

## 2023-06-08 PROCEDURE — 99291 PR CRITICAL CARE, E/M 30-74 MINUTES: ICD-10-PCS | Mod: GC,,, | Performed by: PSYCHIATRY & NEUROLOGY

## 2023-06-08 PROCEDURE — 63600175 PHARM REV CODE 636 W HCPCS: Performed by: PSYCHIATRY & NEUROLOGY

## 2023-06-08 PROCEDURE — 94003 VENT MGMT INPAT SUBQ DAY: CPT

## 2023-06-08 PROCEDURE — 25000003 PHARM REV CODE 250: Performed by: STUDENT IN AN ORGANIZED HEALTH CARE EDUCATION/TRAINING PROGRAM

## 2023-06-08 PROCEDURE — 27000221 HC OXYGEN, UP TO 24 HOURS

## 2023-06-08 PROCEDURE — 85610 PROTHROMBIN TIME: CPT | Mod: 91 | Performed by: PSYCHIATRY & NEUROLOGY

## 2023-06-08 PROCEDURE — 82542 COL CHROMOTOGRAPHY QUAL/QUAN: CPT | Performed by: STUDENT IN AN ORGANIZED HEALTH CARE EDUCATION/TRAINING PROGRAM

## 2023-06-08 PROCEDURE — 94761 N-INVAS EAR/PLS OXIMETRY MLT: CPT

## 2023-06-08 PROCEDURE — 37799 UNLISTED PX VASCULAR SURGERY: CPT

## 2023-06-08 PROCEDURE — 86022 PLATELET ANTIBODIES: CPT | Performed by: STUDENT IN AN ORGANIZED HEALTH CARE EDUCATION/TRAINING PROGRAM

## 2023-06-08 PROCEDURE — 82803 BLOOD GASES ANY COMBINATION: CPT

## 2023-06-08 PROCEDURE — 25000003 PHARM REV CODE 250

## 2023-06-08 PROCEDURE — 25000003 PHARM REV CODE 250: Performed by: PSYCHIATRY & NEUROLOGY

## 2023-06-08 PROCEDURE — 63600175 PHARM REV CODE 636 W HCPCS: Performed by: STUDENT IN AN ORGANIZED HEALTH CARE EDUCATION/TRAINING PROGRAM

## 2023-06-08 PROCEDURE — 85025 COMPLETE CBC W/AUTO DIFF WBC: CPT | Mod: 91 | Performed by: NURSE PRACTITIONER

## 2023-06-08 PROCEDURE — 85610 PROTHROMBIN TIME: CPT | Performed by: NURSE PRACTITIONER

## 2023-06-08 PROCEDURE — 85384 FIBRINOGEN ACTIVITY: CPT | Mod: 91 | Performed by: PSYCHIATRY & NEUROLOGY

## 2023-06-08 PROCEDURE — 99291 CRITICAL CARE FIRST HOUR: CPT | Mod: GC,,, | Performed by: PSYCHIATRY & NEUROLOGY

## 2023-06-08 PROCEDURE — 80053 COMPREHEN METABOLIC PANEL: CPT | Performed by: PSYCHIATRY & NEUROLOGY

## 2023-06-08 PROCEDURE — 85730 THROMBOPLASTIN TIME PARTIAL: CPT | Mod: 91 | Performed by: PSYCHIATRY & NEUROLOGY

## 2023-06-08 PROCEDURE — 20000000 HC ICU ROOM

## 2023-06-08 RX ORDER — ARGATROBAN 1 MG/ML
0-10 INJECTION INTRAVENOUS CONTINUOUS
Status: DISCONTINUED | OUTPATIENT
Start: 2023-06-09 | End: 2023-06-10

## 2023-06-08 RX ADMIN — Medication 800 MG: at 03:06

## 2023-06-08 RX ADMIN — Medication 800 MG: at 09:06

## 2023-06-08 RX ADMIN — RIFAXIMIN 550 MG: 550 TABLET ORAL at 09:06

## 2023-06-08 RX ADMIN — PANTOPRAZOLE SODIUM 40 MG: 40 INJECTION, POWDER, FOR SOLUTION INTRAVENOUS at 09:06

## 2023-06-08 RX ADMIN — Medication 100 MG: at 09:06

## 2023-06-08 RX ADMIN — LEVETIRACETAM 500 MG: 100 INJECTION, SOLUTION INTRAVENOUS at 09:06

## 2023-06-08 RX ADMIN — LACTULOSE 200 G: 10 SOLUTION ORAL at 03:06

## 2023-06-08 RX ADMIN — SODIUM BICARBONATE 1300 MG: 650 TABLET ORAL at 09:06

## 2023-06-08 RX ADMIN — ARIPIPRAZOLE 5 MG: 5 TABLET ORAL at 10:06

## 2023-06-08 RX ADMIN — FOLIC ACID 1 MG: 1 TABLET ORAL at 09:06

## 2023-06-08 RX ADMIN — LACTULOSE 200 G: 10 SOLUTION ORAL at 09:06

## 2023-06-08 RX ADMIN — CEFTRIAXONE 1 G: 1 INJECTION, POWDER, FOR SOLUTION INTRAMUSCULAR; INTRAVENOUS at 02:06

## 2023-06-08 RX ADMIN — FENTANYL CITRATE 50 MCG: 0.05 INJECTION, SOLUTION INTRAMUSCULAR; INTRAVENOUS at 07:06

## 2023-06-08 NOTE — PLAN OF CARE
[facility] Care Plan    POC reviewed with Marely Hamilton and family at 1400. Pt not able to verbalize understanding. Questions and concerns addressed. No acute events today. Pt progressing toward goals. Will continue to monitor. See below and flowsheets for full assessment and VS info.             Is this a stroke patient? yes- Stroke booklet reviewed with patient, risk factors identified for patient and stroke booklet remains at bedside for ongoing education.     Neuro:  [place] Coma Scale  Best Eye Response: 4-->(E4) spontaneous  Best Motor Response: 6-->(M6) obeys commands  Best Verbal Response: 1-->(V1) none  Cheryl Coma Scale Score: 11  Assessment Qualifiers: patient intubated  Pupil PERRLA: no     24 hr Temp:  [98.4 °F (36.9 °C)-99.9 °F (37.7 °C)]     CV:   Rhythm: normal sinus rhythm  BP goals:   SBP < 180  MAP > 65    Resp:      Vent Mode: SIMV  Set Rate: 14 BPM  Oxygen Concentration (%): 40  Vt Set: 320 mL  PEEP/CPAP: 5 cmH20  Pressure Support: 12 cmH20    Plan: N/A    GI/:     Diet/Nutrition Received: tube feeding  Last Bowel Movement: 06/05/23  Voiding Characteristics: ureteral catheter    Intake/Output Summary (Last 24 hours) at 6/8/2023 1818  Last data filed at 6/8/2023 1811  Gross per 24 hour   Intake 2110 ml   Output 2575 ml   Net -465 ml     Unmeasured Output  Urine Occurrence: 0  Stool Occurrence: 1  Emesis Occurrence: 0  Pad Count: 2    Labs/Accuchecks:  Recent Labs   Lab 06/08/23 0257   WBC 3.90   RBC 2.55*   HGB 8.0*   HCT 25.7*   PLT 53*      Recent Labs   Lab 06/08/23  0257   *   K 3.9   CO2 24   *   BUN 10   CREATININE 0.4*   ALKPHOS 136*   ALT 27   AST 38   BILITOT 3.3*      Recent Labs   Lab 06/08/23 0257 06/08/23  0815   INR 2.2*  --    APTT  --  39.6*    No results for input(s): CPK, CPKMB, TROPONINI, MB in the last 168 hours.    Electrolytes: Electrolytes replaced  Accuchecks: Q6H    Gtts:      LDA/Wounds:  Lines/Drains/Airways       Drain  Duration                  NG/OG  Tube 06/01/23 2200 Right nostril 6 days         Rectal Tube 06/03/23 1300 5 days    Female External Urinary Catheter 06/08/23 1815 <1 day              Airway  Duration                  Airway - Non-Surgical 06/02/23 1118 Endotracheal Tube 6 days              Arterial Line  Duration             Arterial Line 06/02/23 1050 Left Radial 6 days              Peripheral Intravenous Line  Duration                  Midline Catheter Insertion/Assessment  - Single Lumen 05/25/23 1005 Right basilic vein (medial side of arm) other (see comments) 14 days         Peripheral IV - Single Lumen 06/07/23 1611 20 G Anterior;Left Hand 1 day         Peripheral IV - Single Lumen 06/07/23 1611 20 G Anterior;Left Upper Arm 1 day                  Wounds: Yes  Wound care consulted: Yes

## 2023-06-08 NOTE — SUBJECTIVE & OBJECTIVE
Interval History: As above.     Review of Systems   Unable to perform ROS: Intubated   Constitutional:  Negative for fever.     Objective:     Vitals:  Temp: 99.9 °F (37.7 °C)  Pulse: 108  Rhythm: normal sinus rhythm  BP: 119/63  MAP (mmHg): 77  Resp: 15  SpO2: (!) 93 %  Oxygen Concentration (%): 40  Vent Mode: SIMV  Set Rate: 10 BPM  Pressure Support: 10 cmH20  PEEP/CPAP: 5 cmH20  Peak Airway Pressure: 20 cmH20  Mean Airway Pressure: 8.7 cmH20  Plateau Pressure: 0 cmH20    Temp  Min: 97.9 °F (36.6 °C)  Max: 99.9 °F (37.7 °C)  Pulse  Min: 80  Max: 117  BP  Min: 98/69  Max: 144/64  MAP (mmHg)  Min: 75  Max: 96  Resp  Min: 14  Max: 24  SpO2  Min: 93 %  Max: 100 %  Oxygen Concentration (%)  Min: 40  Max: 40    06/07 0701 - 06/08 0700  In: 1454   Out: 1035 [Urine:635]   Unmeasured Output  Urine Occurrence: 0  Stool Occurrence: 1  Emesis Occurrence: 0  Pad Count: 2        Physical Exam  Vitals and nursing note reviewed.   Constitutional:       General: She is not in acute distress.     Appearance: She is ill-appearing.      Comments: Opens eye spontaneously, following commands   HENT:      Head: Normocephalic and atraumatic.   Eyes:      General: Scleral icterus present.      Extraocular Movements: Extraocular movements intact.      Pupils: Pupils are equal, round, and reactive to light.   Cardiovascular:      Rate and Rhythm: Normal rate.   Pulmonary:      Comments:   Intubated and mechanically ventilated   Abdominal:      Palpations: Abdomen is soft.   Musculoskeletal:      Cervical back: Neck supple.   Skin:     General: Skin is warm.   Neurological:      Mental Status: She is alert.      Comments:   Alert, Intubated, unable to test orientation  Follows commands x4  Pupils equal, reactive  Face grossly symmetric   Moves all extremities antigravity and to commands          Medications:  Continuous   ScheduledARIPiprazole, 5 mg, Daily  cefTRIAXone (ROCEPHIN) IVPB, 1 g, Q24H  folic acid, 1 mg, Daily  lactulose, 200 g,  TID  levETIRAcetam (Keppra) IV (PEDS and ADULTS), 500 mg, Q12H  pantoprazole, 40 mg, BID  rifAXIMin, 550 mg, BID  sodium bicarbonate, 1,300 mg, BID  thiamine, 100 mg, Daily    PRNaluminum-magnesium hydroxide-simethicone, 30 mL, QID PRN  dextrose 10%, 12.5 g, PRN  dextrose 10%, 25 g, PRN  dextrose, 15 g, PRN  dextrose, 30 g, PRN  fentaNYL, 50 mcg, Q1H PRN  glucagon (human recombinant), 1 mg, PRN  insulin aspart U-100, 1-10 Units, Q6H PRN  magnesium oxide, 800 mg, PRN  magnesium oxide, 800 mg, PRN  melatonin, 6 mg, Nightly PRN  naloxone, 0.02 mg, PRN  ondansetron, 4 mg, Q8H PRN  potassium bicarbonate, 35 mEq, PRN  potassium bicarbonate, 50 mEq, PRN  potassium bicarbonate, 60 mEq, PRN  potassium, sodium phosphates, 2 packet, PRN  potassium, sodium phosphates, 2 packet, PRN  potassium, sodium phosphates, 2 packet, PRN  simethicone, 1 tablet, QID PRN  sodium chloride 0.9%, 10 mL, Q8H PRN      Today I personally reviewed pertinent medications, lines/drains/airways, imaging, laboratory results, microbiology results, notably:    CBC, CMP, Mg, Phos, ABG    Diet  Diet NPO  Diet NPO

## 2023-06-08 NOTE — PLAN OF CARE
[facility] - Neuro Critical Care  Discharge Reassessment    Primary Care Provider: Viktor Ross MD    Expected Discharge Date: 6/15/2023    Per MD: 06/08/2023 Failed SBT due to apnea. Some breaths on SIMV. Awake and following commands. Ammonia trending down (44) with BMs, continue lactulose. Advance nutritional support with goal to increase strength towards extubation.    Patient intubated on vent.  Not medically stable for discharge.      Reassessment (most recent)       Discharge Reassessment - 06/08/23 1707          Discharge Reassessment    Assessment Type Discharge Planning Reassessment     Did the patient's condition or plan change since previous assessment? No     Communicated BRAYAN with patient/caregiver Date not available/Unable to determine     Discharge Plan A Long-term acute care facility (LTAC)     Discharge Plan B Rehab     DME Needed Upon Discharge  none     Transition of Care Barriers None     Why the patient remains in the hospital Requires continued medical care                   Dixie Faith RN, CCRN-K, [facility]  Neuro-Critical Care   [phone]

## 2023-06-08 NOTE — PROGRESS NOTES
[facility] - Neuro Critical Care  Neurocritical Care  Progress Note    Admit Date: 5/28/2023  Service Date: 06/08/2023  Length of Stay: 11    Subjective:     Chief Complaint: Non-convulsive status epilepticus    History of Present Illness: Marely Hamilton is a 58 year old female with a medical history significant for EtOH induced hepatic cirrhosis, seizure disorder on outpatient LEV and ZNS and bipolar disorder who was admitted to [facility] on 5/28 as a transfer from OSH for evaluation of persistent encephalopathy concerning for NCSE vs hepatic encephalopathy. History is obtained from chart as patient is unresponsive and unable to assist. Initially presented to [facility] on 5/18 for encephalopathy and thought to be multifactorial including UTI (Proteus mirabilis s/p CTX course), hypercalcemia 2/2 lithium toxicity (Lithium changed to Abilify per Psych), as well as hepatic encephalopathy secondary to medication non compliance. She was treated with lactulose and rifaximin with no improvement in her mental status. EEG showed generalized slowing as well as triphasic discharges in the left hemisphere. MRI Brain WO was unremarkable for acute neurologic change. Decision was made to transfer patient to [facility] for higher level of care and ongoing evaluation and management. On arrival, mental status exam has waxed and waned with patient being intermittently verbal and following commands. NH4 48.    NCC is consulted on hospital day 4 for admission after EEG showed frequent left hemispheric electrographic seizures lasting on average 10-30 seconds with prolonged seizures over 1.5 hours also noted. Per chart review, patient home dose of LEV increased from 1000mg to 1500mg BID, ZNS increased to 200mg. Vimpat 150mg BID added per General Neurology (had not been given prior to consult). On initial evaluation, patient is not responsive to voice or pain. Upward gaze noted. Decision made to transfer patient to [facility] for higher level of care and  airway watch.       Hospital Course: 06/02/2023 Tachycardic and hypotensive, transferred for development of mostly right hemispheric status, LOC exam remains poor  Intubated for airway protection, EEG has changed to mostly include triphasics with no definite seizure activity per Dr Boss, propofol stopped and AEDs simplified to keppra 1000mg bid only, wean off pressor, give colloids with crystalloid resuscitation  06/03/2023: remains on persistant significant pressor support despite adequate hydration, problems with third spacing and may have pulmonary hypertension as well, consider trial of stress steroids if hgb stabilizes  06/04/2023: levo down to 0.08mcg, start and advance TFs, vaso at 0.04U, ammonia has been stable, slight rise in tbili, check direct bili, hgb and plts improved, no clear source of bleeding, no source of bleeding on CT abdomen, consider superimposed hemolysis  06/05/2023 NAEON. Neurologic exam continues to improve, following commands in all extremities. Levo 0.02, weaning as able. Vaso discontinued, MAP>65. Daily SBT, monitor and hold TF at midnight for possible extubation tomorrow. Hemolysis work up ongoing, trending CBC. Continue CTX for SBP prophylaxis.   06/06/2023 Awake and following commands. SBT with low tidal volumes. Remains intubated for airway protection at this time with possible extubation tomorrow with continued neurologic improvement. Levo discontinued, maintaining SBP<65. Concern for hemolytic anemia related to autoimmune process vs hepatic dysfunction (elevated reticulocyte count and decreased haptoglobin).  06/07/2023 Rested on AC overnight due to low TV and fatigue. SBT with pressure support 10/5 this am, weaning ventilator as tolerated. Continue tube feeds with goal to optimize nutrition. Ammonia elevated, continue lactulose to encourage bowel movement.   06/08/2023 Failed SBT due to apnea. Some breaths on SIMV. Awake and following commands. Ammonia trending down (44) with  BMs, continue lactulose. Advance nutritional support with goal to increase strength towards extubation. Plt transfusions PRN. Cryo for fibrinogen <100.      Interval History: As above.     Review of Systems   Unable to perform ROS: Intubated   Constitutional:  Negative for fever.     Objective:     Vitals:  Temp: 99.9 °F (37.7 °C)  Pulse: 108  Rhythm: normal sinus rhythm  BP: 119/63  MAP (mmHg): 77  Resp: 15  SpO2: (!) 93 %  Oxygen Concentration (%): 40  Vent Mode: SIMV  Set Rate: 10 BPM  Pressure Support: 10 cmH20  PEEP/CPAP: 5 cmH20  Peak Airway Pressure: 20 cmH20  Mean Airway Pressure: 8.7 cmH20  Plateau Pressure: 0 cmH20    Temp  Min: 97.9 °F (36.6 °C)  Max: 99.9 °F (37.7 °C)  Pulse  Min: 80  Max: 117  BP  Min: 98/69  Max: 144/64  MAP (mmHg)  Min: 75  Max: 96  Resp  Min: 14  Max: 24  SpO2  Min: 93 %  Max: 100 %  Oxygen Concentration (%)  Min: 40  Max: 40    06/07 0701 - 06/08 0700  In: 1454   Out: 1035 [Urine:635]   Unmeasured Output  Urine Occurrence: 0  Stool Occurrence: 1  Emesis Occurrence: 0  Pad Count: 2        Physical Exam  Vitals and nursing note reviewed.   Constitutional:       General: She is not in acute distress.     Appearance: She is ill-appearing.      Comments: Opens eye spontaneously, following commands   HENT:      Head: Normocephalic and atraumatic.   Eyes:      General: Scleral icterus present.      Extraocular Movements: Extraocular movements intact.      Pupils: Pupils are equal, round, and reactive to light.   Cardiovascular:      Rate and Rhythm: Normal rate.   Pulmonary:      Comments:   Intubated and mechanically ventilated   Abdominal:      Palpations: Abdomen is soft.   Musculoskeletal:      Cervical back: Neck supple.   Skin:     General: Skin is warm.   Neurological:      Mental Status: She is alert.      Comments:   Alert, Intubated, unable to test orientation  Follows commands x4  Pupils equal, reactive  Face grossly symmetric   Moves all extremities antigravity and to  commands          Medications:  Continuous   ScheduledARIPiprazole, 5 mg, Daily  cefTRIAXone (ROCEPHIN) IVPB, 1 g, Q24H  folic acid, 1 mg, Daily  lactulose, 200 g, TID  levETIRAcetam (Keppra) IV (PEDS and ADULTS), 500 mg, Q12H  pantoprazole, 40 mg, BID  rifAXIMin, 550 mg, BID  sodium bicarbonate, 1,300 mg, BID  thiamine, 100 mg, Daily    PRNaluminum-magnesium hydroxide-simethicone, 30 mL, QID PRN  dextrose 10%, 12.5 g, PRN  dextrose 10%, 25 g, PRN  dextrose, 15 g, PRN  dextrose, 30 g, PRN  fentaNYL, 50 mcg, Q1H PRN  glucagon (human recombinant), 1 mg, PRN  insulin aspart U-100, 1-10 Units, Q6H PRN  magnesium oxide, 800 mg, PRN  magnesium oxide, 800 mg, PRN  melatonin, 6 mg, Nightly PRN  naloxone, 0.02 mg, PRN  ondansetron, 4 mg, Q8H PRN  potassium bicarbonate, 35 mEq, PRN  potassium bicarbonate, 50 mEq, PRN  potassium bicarbonate, 60 mEq, PRN  potassium, sodium phosphates, 2 packet, PRN  potassium, sodium phosphates, 2 packet, PRN  potassium, sodium phosphates, 2 packet, PRN  simethicone, 1 tablet, QID PRN  sodium chloride 0.9%, 10 mL, Q8H PRN      Today I personally reviewed pertinent medications, lines/drains/airways, imaging, laboratory results, microbiology results, notably:    CBC, CMP, Mg, Phos, ABG    Diet  Diet NPO  Diet NPO          Assessment/Plan:     Neuro  * Non-convulsive status epilepticus  58F with EtOH induced hepatic cirrhosis, seizure disorder on outpatient LEV and ZNS and bipolar disorder admitted on 5/28 for persistent encephalopathy.    - UTI on admit (Proteus mirabilis), now s/p CTX course  - Hypercalcemia 2/2 lithium toxicity (Lithium changed to Abilify per Psych)  - Hepatic encephalopathy 2/2 to medication non compliance, treated with lactulose and rifaximin     MRI Brain WO was unremarkable for acute neurologic change  NH4 48.  EEG w/ frequent left hemispheric electrographic seizures lasting on average 10-30 seconds with prolonged seizures over 1.5 hours also noted.   Repeat EEGs without  seizure activity x 24 hours, now resolved    - Admit to [facility] for airway watch   - Q1h neurochecks while in ICU  - Q1h vitals while in ICU  - HOB@30  - Keppra 500mg BID  - Thiamine replacement   - Advance TF as tolerated   - MAP>65  - Daily CMP, Mag, Phos - replete electrolytes PRN  - Lipid panel, A1c, Coags reviewed  - Daily CBC, transfuse PRN  - Start SubQ Heparin in when clinically indicated, currently held due thrombocytopenia   - PT/OT/SLP as appropriate  - CM/SW consult for dispo planning    Acute encephalopathy  Multifactorial   See Non-convulsive status epilepticus    Breakthrough seizure  See Non-convulsive status epilepticus    Psychiatric  Bipolar 1 disorder  See Non-convulsive status epilepticus    Alcohol abuse, in remission  See Non-convulsive status epilepticus    Pulmonary  Acute hypoxemic respiratory failure  Intubated today for airway protection  Difficult intubation due to far anterior airway    Cardiac/Vascular  Hypotension (arterial)  Unclear etiology  First suggestion was decreased intravascular volume plus splanchnic dilataion  Responded partially to aggressive crystalloid and colloid hydration  S/p stress dose steroids   No clear signs to suggest sepsis at present  Required pressor support, d/c'd 6/6    Renal/  Hypernatremia  Daily CMP  Improved with free water administration, RESOLVED     Hematology  Thrombocytopenia  Likely secondary to hepatic cirrhosis and development of splenomegaly  Has worsened and associated with hgb drop, transfuse to >50K  No signs of bleeding in GI tract or on CT, hemolysis on labs  Hematology consulted     Oncology  Anemia  Chronic  Hemolysis work up pending, Hematology consulted  Trend CBC    Macrocytic anemia  Daily CBC    Endocrine  Hyperammonemia  Stable on current medical regimen   See Non-convulsive status epilepticus      GI  Acute liver failure without hepatic coma  GI/Hepatology following    Hepatic cirrhosis  Continue Lactulose and Rifaximin   Ammonia  elevated but downtrending with bowel reg, advanced TF   Follow coags and fibrinogen  SBP prophylaxis with ceftriaxone x7 days total    Hepatic encephalopathy  Continue lactulose   See Non-convulsive status epilepticus          The patient is being Prophylaxed for:  Venous Thromboembolism with: Mechanical  Stress Ulcer with: H2B  Ventilator Pneumonia with: chlorhexidine oral care    Activity Orders          Diet NPO: NPO starting at 06/01 1055    Turn patient starting at 05/28 6656    Progressive Mobility Protocol (mobilize patient to their highest level of functioning at least twice daily) starting at 05/28 2000        Full Code    Cameron Yadav MD  Neurocritical Care  [facility] - Neuro Critical Care

## 2023-06-08 NOTE — PLAN OF CARE
[facility] Care Plan    POC reviewed with Marely Hamilton at 0300. No acute events overnight. Pt progressing toward goals. Will continue to monitor. See below and flowsheets for full assessment and VS info.       Is this a stroke patient? no    Neuro:  Cheryl Coma Scale  Best Eye Response: 4-->(E4) spontaneous  Best Motor Response: 6-->(M6) obeys commands  Best Verbal Response: 1-->(V1) none  Cheryl Coma Scale Score: 11  Assessment Qualifiers: patient intubated  Pupil PERRLA: no     24hr Temp:  [97.2 °F (36.2 °C)-99.3 °F (37.4 °C)]     CV:   Rhythm: normal sinus rhythm  BP goals:   SBP < 180  MAP > 65    Resp:      Vent Mode: SIMV  Set Rate: 14 BPM  Oxygen Concentration (%): 40  Vt Set: 320 mL  PEEP/CPAP: 5 cmH20  Pressure Support: 10 cmH20    Plan: wean to extubate    GI/:     Diet/Nutrition Received: tube feeding  Last Bowel Movement: 06/07/23  Voiding Characteristics: urethral catheter (bladder)    Intake/Output Summary (Last 24 hours) at 6/8/2023 0558  Last data filed at 6/8/2023 0501  Gross per 24 hour   Intake 1454.02 ml   Output 1735 ml   Net -280.98 ml     Unmeasured Output  Urine Occurrence: 0  Stool Occurrence: 1  Emesis Occurrence: 0  Pad Count: 2    Labs/Accuchecks:  Recent Labs   Lab 06/08/23  0257   WBC 3.90   RBC 2.55*   HGB 8.0*   HCT 25.7*   PLT 53*      Recent Labs   Lab 06/08/23  0257   *   K 3.9   CO2 24   *   BUN 10   CREATININE 0.4*   ALKPHOS 136*   ALT 27   AST 38   BILITOT 3.3*      Recent Labs   Lab 06/07/23 2107 06/08/23  0257   INR  --  2.2*   APTT 38.0*  --     No results for input(s): CPK, CPKMB, TROPONINI, MB in the last 168 hours.    Electrolytes: N/A - electrolytes WDL  Accuchecks: Q6H    Gtts:      LDA/Wounds:  Lines/Drains/Airways       Drain  Duration                  NG/OG Tube 06/01/23 2200 Right nostril 6 days         Urethral Catheter 06/01/23 2101 Temperature probe 6 days         Rectal Tube 06/03/23 1300 4 days              Airway  Duration                  Airway  - Non-Surgical 06/02/23 1118 Endotracheal Tube 5 days              Arterial Line  Duration             Arterial Line 06/02/23 1050 Left Radial 5 days              Peripheral Intravenous Line  Duration                  Midline Catheter Insertion/Assessment  - Single Lumen 05/25/23 1005 Right basilic vein (medial side of arm) other (see comments) 13 days         Peripheral IV - Single Lumen 06/07/23 1611 20 G Anterior;Left Hand <1 day         Peripheral IV - Single Lumen 06/07/23 1611 20 G Anterior;Left Upper Arm <1 day                  Wounds: Yes  Wound care consulted: Yes      Problem: Adult Inpatient Plan of Care  Goal: Plan of Care Review  Outcome: Ongoing, Progressing  Goal: Patient-Specific Goal (Individualized)  Outcome: Ongoing, Progressing  Goal: Absence of Hospital-Acquired Illness or Injury  Outcome: Ongoing, Progressing     Problem: Oral Intake Inadequate (Acute Kidney Injury/Impairment)  Goal: Optimal Nutrition Intake  Outcome: Ongoing, Progressing     Problem: Skin Injury Risk Increased  Goal: Skin Health and Integrity  Outcome: Ongoing, Progressing     Problem: Device-Related Complication Risk (Mechanical Ventilation, Invasive)  Goal: Optimal Device Function  Outcome: Ongoing, Progressing     Problem: Skin and Tissue Injury (Mechanical Ventilation, Invasive)  Goal: Absence of Device-Related Skin and Tissue Injury  Outcome: Ongoing, Progressing     Problem: Communication Impairment (Artificial Airway)  Goal: Effective Communication  Outcome: Ongoing, Progressing     Problem: Noninvasive Ventilation Acute  Goal: Effective Unassisted Ventilation and Oxygenation  Outcome: Ongoing, Progressing     Problem: Altered Behavior (Delirium)  Goal: Improved Behavioral Control  Outcome: Ongoing, Progressing     Problem: Sleep Disturbance (Delirium)  Goal: Improved Sleep  Outcome: Ongoing, Progressing

## 2023-06-08 NOTE — ASSESSMENT & PLAN NOTE
Continue Lactulose and Rifaximin   Ammonia elevated but downtrending with bowel reg, advanced TF   Follow coags and fibrinogen  SBP prophylaxis with ceftriaxone x7 days total

## 2023-06-08 NOTE — PHYSICIAN QUERY
PT Name: Marely Hamilton  MR #: [id number]    DOCUMENTATION CLARIFICATION     CDS/:  Erasto Guajardo RN, CDS                   Contact Information:  [email or web address]    This form is a permanent document in the medical record.     Query Date: June 8, 2023    By submitting this query, we are merely seeking further clarification of documentation.. Please utilize your independent clinical judgment when addressing the question(s) below.    The medical record contains the following:   Indicators  Supporting Clinical Findings Location in Medical Record    Energy Intake     X Weight Loss Wt loss of 28.55% x 3 months   Nutrition Consult 6/5   X Fat Loss severe depletion    Nutrition Consult 6/5   X Muscle Loss severe depletion    Nutrition Consult 6/5    Edema/Fluid Accumulation      Reduced  Strength (by dynamometer)     X Weight, BMI, Usual Body Weight BMI (Calculated): 24.1    Weight: 59.9 kg (132 lb)   Nutrition Consult 6/5    Delayed Wound Healing     X Registered Dietician Diagnosis Severe protein-calorie malnutrition   Nutrition Consult 6/5   X Acute or Chronic Illness Non-convulsive status epilepticus  Acute encephalopathy  Breakthrough seizure  Bipolar 1 disorder  Alcohol abuse, in remission  Acute hypoxemic respiratory failure  Hypotension (arterial)  Hypernatremia  Thrombocytopenia  Anemia  Macrocytic anemia  Hyperammonemia  Acute liver failure without hepatic coma  Hepatic cirrhosis  Hepatic encephalopathy   NCC PN 6/7    Social or Environmental Circumstances     X Treatment Recommendations     1. Recommend goal of Impact Peptide 1.5 @ 40 ml/hr to provide 1440 kcal, 90 g pro, 739 ml water. Advance/adjust as appropriate.      2. Bolus TF rec: Impact Peptide 1.5 4 cans per day to provide 1500 kcal, 94 g pro, 772 mL water.      3. RD following.    Nutrition Consult 6/5   X Other RD consulted for TF recommendations. Patient ventilated. Current feeds of Impact Peptide 1.5 @ 20 ml/hr via NGT. NFPE  performed on 5/19 per nutrition chart review. Patient previously seen in [place] on 5/25 prior to admit.    Nutrition Consult 6/5     Academy of Nutrition and Dietetics (Academy) and the American Society for Parenteral and Enteral Nutrition (A.S.P.E.N.) Clinical Characteristics to support Malnutrition   Malnutrition in the Context of Acute Illness or Injury Malnutrition in the Context of Chronic Illness or Injury Malnutrition in the Context of Social or Environmental Circumstances   Malnutrition Level Moderate Severe Moderate Severe   Moderate   Severe   Energy Intake <75%                   >7 days <50%                 >5 days <75%           >1 month <75%                      >1 month   <75% for >3 months   <50% for >1 month   Weight Loss   1-2% in 1 week >2% in 1 week 5% in 1 month >5% in 1 month 5% in 1 month >5% in 1 month    5% in 1 month >5% in 1 month 7.5% in 3 months >7.5% in 3 months 7.5% in 3 months >7.5% in 3 months    7.5% in 3 months >7.5% in 3 months 10% in 6 months >10% in 6 months 10% in 6 months >10% in 6 months        20% in 1 year                    >20% in 1 year                                                                  20% in 1 year                            >20% in 1 year                                                  Subcutaneous Fat Loss Mild  Moderate  Mild  Severe    Mild   Severe   Muscle Loss Mild  Moderate  Mild  Severe    Mild   Severe   Edema/Fluid Accumulation Mild Moderate to severe  Mild  Severe   Mild   Severe   Reduced  Strength         (based on standards supplied by  of dynamometer) N/A Measurably reduced N/A Measurably reduced N/A Measurably reduced     Criteria for mild malnutrition is defined as 1 characteristic outlined above within the established moderate or severe parameters.  A minimum of 2 out of the 6 characteristics noted above are recommended for a diagnosis of moderate or severe malnutrition.  Chronic illness/injury is a disease/condition  lasting 3 months or longer.    The noted clinical guidelines are only system guidelines and do not replace the providers clinical judgment.    Provider, please clarify/confirm the nutrition diagnosis as noted above.    [x  ] Severe Malnutrition - a minimum of 2 of the 6 severe malnutrition characteristics noted above      [  ] Other Nutritional Diagnosis (please specify): _______     [  ] Malnutrition ruled out       Please document in your progress notes daily for the duration of treatment until resolved and  include in your discharge summary.      References:    LARA Valencia, & JENNIFER Silva (2022, April). Assessment and management of anorexia and cachexia in palliative care. Retrieved May 23, 2022, from https://www.[employer]/contents/assessment-and-management-of-anorexia-and-cachexia-in-palliative-care?dyohgUum=5666&source=see_link     CARRINGTON Iglesias, PhD, RD, Lorraine CROSS P., PhD, RN, ARTHUR Orozco MD, PhD, [place]arrison PEREIRA A., MS, RD, [facility], LEXII Escalante, MS, RD, The Academy Malnutrition Work Group, The A.S.P.E.N. Board of Directors. (2012). Consensus Statement: Academy of Nutrition and Dietetics and American Society for Parenteral and Enteral Nutrition: Characteristics Recommended for the Identification and Documentation of Adult Malnutrition (Undernutrition). Journal of Parenteral and Enteral Nutrition, 36(3), 275-283. doi:10.1177/0660172306899259     Form No. 01279

## 2023-06-08 NOTE — PLAN OF CARE
[facility] - Neuro Critical Care  Hematology/Oncology  Plan of Care Note     Patient Name: Marely Hamilton  MRN: [id number]  Admission Date: 5/28/2023  Hospital Length of Stay: 9 days  Code Status: Full Code   Attending Provider: Myles Kerr DO  Primary Care Physician: Viktor Ross MD  Principal Problem:Non-convulsive status epilepticus       Subjective:      HPI:  Patient is a 58 year old female with extensive medical history including EtOH induced hepatic cirrhosis, seizure disorder and bipolar disorder who was [place] to [facility] [place] as a transfer from OSH for evaluation of persistent encephalopathy concerning for NCSE vs hepatic encephalopathy. Patient initially admitted to hospital medicine. EEG was done due to waxing and waning mental status. Findings concerning for non convulsive status epilepticus so patient moved to neuro ICU where she was intubated; also hypotensive requiring pressor support. Hematology consulted regarding concern for hemolytic anemia.         Assessment/Plan:      Macrocytic anemia  Thrombocytopenia     58 year old female with history of alcohol induced liver cirrhosis and seizure disorder who is currently in neuro icu intubated and who was previously on pressors for shock of unknown etiology. Patient has had anemia as low as 5.3, thrombocytopenia down to 31, elevated bilirubin (primarily indirect), and undetectable haptoglobin (11 on 6/5 labs), elevated coag studies, and low fibrinogen. Studies c/f hemolytic process, however, patient's history of cirrhosis clouds the picture as on chart review she has had multiple similar lab abnormalities in the past; cirrhosis can also prolong coagulation studies, explain thrombocytopenia, lead to low haptoglobin etc. Additionally, there is concern for HIT given recent administration of LMWH at OSH, and subsequent plt cris of <50% BL 5-10 days after this. 4T score = 4; moderate risk of HIT.     Patient continues to have elevated aPTT, PT/INR, and  "low fibrinogen on follow-up labs. Pathology review of PBS:  "Red cells appear predominantly normochromic and normocytic with   moderate anisocytosis and mild polychromasia. The polychromasia could contribute to the mildly increased MCV. There is also mild to moderate poikilocytosis with scattered elliptocytes and rare red cell fragments.  Target cells are also seen.  Target cells can be seen liver or splenic diseases as well as hemoglobinopathies or thalassemia. White cells show a predominance of segmented neutrophils with mild toxic granulation, favor reactive. Platelets are morphologically unremarkable if decreased in number by automated count.  No significant platelet clumping is seen on scanning."     Plan:  -- please obtain HIT ab and hold all AC prior to ruling out HIT  -- continue to trend aPTT, PT/INR, CBC, fibrinogen  -- transfuse Hgb > 7  -- transfuse Plt > 10, or >50 if actively bleeding  -- if Fibrinogen > 100 pleave give Cryo  -- f/u upper and lower extremity dopplers. If DVT noted, ok to anticoagulate      Billy Alvarado MD  [facility]  Hematology/Oncology  "

## 2023-06-08 NOTE — ASSESSMENT & PLAN NOTE
58F with EtOH induced hepatic cirrhosis, seizure disorder on outpatient LEV and ZNS and bipolar disorder admitted on 5/28 for persistent encephalopathy.    - UTI on admit (Proteus mirabilis), now s/p CTX course  - Hypercalcemia 2/2 lithium toxicity (Lithium changed to Abilify per Psych)  - Hepatic encephalopathy 2/2 to medication non compliance, treated with lactulose and rifaximin     MRI Brain WO was unremarkable for acute neurologic change  NH4 48.  EEG w/ frequent left hemispheric electrographic seizures lasting on average 10-30 seconds with prolonged seizures over 1.5 hours also noted.   Repeat EEGs without seizure activity x 24 hours, now resolved    - Admit to [facility] for airway watch   - Q1h neurochecks while in ICU  - Q1h vitals while in ICU  - HOB@30  - Keppra 500mg BID  - Thiamine replacement   - Advance TF as tolerated   - MAP>65  - Daily CMP, Mag, Phos - replete electrolytes PRN  - Lipid panel, A1c, Coags reviewed  - Daily CBC, transfuse PRN  - Start SubQ Heparin in when clinically indicated, currently held due thrombocytopenia   - PT/OT/SLP as appropriate  - CM/SW consult for dispo planning

## 2023-06-09 PROBLEM — I82.621 ACUTE DEEP VEIN THROMBOSIS (DVT) OF BRACHIAL VEIN OF RIGHT UPPER EXTREMITY: Status: ACTIVE | Noted: 2023-06-09

## 2023-06-09 PROBLEM — I82.411 DVT OF DEEP FEMORAL VEIN, RIGHT: Status: ACTIVE | Noted: 2023-06-09

## 2023-06-09 LAB
ACANTHOCYTES BLD QL SMEAR: PRESENT
ALBUMIN SERPL BCP-MCNC: 2.4 G/DL (ref 3.5–5.2)
ALBUMIN SERPL BCP-MCNC: 2.5 G/DL (ref 3.5–5.2)
ALLENS TEST: ABNORMAL
ALP SERPL-CCNC: 165 U/L (ref 55–135)
ALP SERPL-CCNC: 167 U/L (ref 55–135)
ALT SERPL W/O P-5'-P-CCNC: 27 U/L (ref 10–44)
ALT SERPL W/O P-5'-P-CCNC: 27 U/L (ref 10–44)
AMMONIA PLAS-SCNC: 28 UMOL/L (ref 10–50)
ANION GAP SERPL CALC-SCNC: 6 MMOL/L (ref 8–16)
ANISOCYTOSIS BLD QL SMEAR: SLIGHT
APTT PPP: 55.8 SEC (ref 21–32)
APTT PPP: 64.6 SEC (ref 21–32)
APTT PPP: 65.5 SEC (ref 21–32)
APTT PPP: 75.7 SEC (ref 21–32)
AST SERPL-CCNC: 33 U/L (ref 10–40)
AST SERPL-CCNC: 34 U/L (ref 10–40)
BASOPHILS # BLD AUTO: 0.01 K/UL (ref 0–0.2)
BASOPHILS NFR BLD: 0.2 % (ref 0–1.9)
BILIRUB DIRECT SERPL-MCNC: 1.2 MG/DL (ref 0.1–0.3)
BILIRUB SERPL-MCNC: 3.5 MG/DL (ref 0.1–1)
BILIRUB SERPL-MCNC: 3.6 MG/DL (ref 0.1–1)
BUN SERPL-MCNC: 13 MG/DL (ref 6–20)
BURR CELLS BLD QL SMEAR: ABNORMAL
CALCIUM SERPL-MCNC: 7.9 MG/DL (ref 8.7–10.5)
CHLORIDE SERPL-SCNC: 115 MMOL/L (ref 95–110)
CO2 SERPL-SCNC: 26 MMOL/L (ref 23–29)
CREAT SERPL-MCNC: 0.5 MG/DL (ref 0.5–1.4)
CREAT SERPL-MCNC: 0.5 MG/DL (ref 0.5–1.4)
DELSYS: ABNORMAL
DIFFERENTIAL METHOD: ABNORMAL
DOHLE BOD BLD QL SMEAR: PRESENT
EOSINOPHIL # BLD AUTO: 0.1 K/UL (ref 0–0.5)
EOSINOPHIL NFR BLD: 2.4 % (ref 0–8)
ERYTHROCYTE [DISTWIDTH] IN BLOOD BY AUTOMATED COUNT: 25.8 % (ref 11.5–14.5)
ERYTHROCYTE [SEDIMENTATION RATE] IN BLOOD BY WESTERGREN METHOD: 14 MM/H
EST. GFR  (NO RACE VARIABLE): >60 ML/MIN/1.73 M^2
EST. GFR  (NO RACE VARIABLE): >60 ML/MIN/1.73 M^2
FIBRINOGEN PPP-MCNC: 117 MG/DL (ref 182–400)
FIBRINOGEN PPP-MCNC: 126 MG/DL (ref 182–400)
FIO2: 40
GLUCOSE SERPL-MCNC: 153 MG/DL (ref 70–110)
HCO3 UR-SCNC: 23 MMOL/L (ref 24–28)
HCT VFR BLD AUTO: 26.9 % (ref 37–48.5)
HGB BLD-MCNC: 8.6 G/DL (ref 12–16)
HYPOCHROMIA BLD QL SMEAR: ABNORMAL
IMM GRANULOCYTES # BLD AUTO: 0.02 K/UL (ref 0–0.04)
IMM GRANULOCYTES NFR BLD AUTO: 0.4 % (ref 0–0.5)
LYMPHOCYTES # BLD AUTO: 1.1 K/UL (ref 1–4.8)
LYMPHOCYTES NFR BLD: 21.9 % (ref 18–48)
MAGNESIUM SERPL-MCNC: 1.7 MG/DL (ref 1.6–2.6)
MCH RBC QN AUTO: 31.9 PG (ref 27–31)
MCHC RBC AUTO-ENTMCNC: 32 G/DL (ref 32–36)
MCV RBC AUTO: 100 FL (ref 82–98)
MODE: ABNORMAL
MONOCYTES # BLD AUTO: 0.5 K/UL (ref 0.3–1)
MONOCYTES NFR BLD: 9.8 % (ref 4–15)
NEUTROPHILS # BLD AUTO: 3.3 K/UL (ref 1.8–7.7)
NEUTROPHILS NFR BLD: 65.3 % (ref 38–73)
NRBC BLD-RTO: 0 /100 WBC
OVALOCYTES BLD QL SMEAR: ABNORMAL
PCO2 BLDA: 34.7 MMHG (ref 35–45)
PEEP: 5
PF4 HEPARIN CMPLX AB SER QL: NORMAL OD (ref 0–0.4)
PH SMN: 7.43 [PH] (ref 7.35–7.45)
PHOSPHATE SERPL-MCNC: 2 MG/DL (ref 2.7–4.5)
PIP: 15
PLATELET # BLD AUTO: 59 K/UL (ref 150–450)
PLATELET BLD QL SMEAR: ABNORMAL
PMV BLD AUTO: 9.7 FL (ref 9.2–12.9)
PO2 BLDA: 33 MMHG (ref 40–60)
POC BE: -1 MMOL/L
POC SATURATED O2: 66 % (ref 95–100)
POC TCO2: 24 MMOL/L (ref 24–29)
POCT GLUCOSE: 134 MG/DL (ref 70–110)
POCT GLUCOSE: 163 MG/DL (ref 70–110)
POIKILOCYTOSIS BLD QL SMEAR: SLIGHT
POLYCHROMASIA BLD QL SMEAR: ABNORMAL
POTASSIUM SERPL-SCNC: 3.4 MMOL/L (ref 3.5–5.1)
PROT SERPL-MCNC: 4.6 G/DL (ref 6–8.4)
PROT SERPL-MCNC: 4.6 G/DL (ref 6–8.4)
PS: 12
RBC # BLD AUTO: 2.7 M/UL (ref 4–5.4)
SAMPLE: ABNORMAL
SCHISTOCYTES BLD QL SMEAR: ABNORMAL
SCHISTOCYTES BLD QL SMEAR: PRESENT
SITE: ABNORMAL
SODIUM SERPL-SCNC: 147 MMOL/L (ref 136–145)
SP02: 97
SPHEROCYTES BLD QL SMEAR: ABNORMAL
TOXIC GRANULES BLD QL SMEAR: PRESENT
WBC # BLD AUTO: 4.98 K/UL (ref 3.9–12.7)

## 2023-06-09 PROCEDURE — 85384 FIBRINOGEN ACTIVITY: CPT | Performed by: PSYCHIATRY & NEUROLOGY

## 2023-06-09 PROCEDURE — 85730 THROMBOPLASTIN TIME PARTIAL: CPT | Mod: 91 | Performed by: PSYCHIATRY & NEUROLOGY

## 2023-06-09 PROCEDURE — 63600175 PHARM REV CODE 636 W HCPCS: Mod: JZ,JG | Performed by: NURSE PRACTITIONER

## 2023-06-09 PROCEDURE — 63600175 PHARM REV CODE 636 W HCPCS: Performed by: PSYCHIATRY & NEUROLOGY

## 2023-06-09 PROCEDURE — 80053 COMPREHEN METABOLIC PANEL: CPT | Performed by: PSYCHIATRY & NEUROLOGY

## 2023-06-09 PROCEDURE — 63600175 PHARM REV CODE 636 W HCPCS: Performed by: STUDENT IN AN ORGANIZED HEALTH CARE EDUCATION/TRAINING PROGRAM

## 2023-06-09 PROCEDURE — 20000000 HC ICU ROOM

## 2023-06-09 PROCEDURE — 80076 HEPATIC FUNCTION PANEL: CPT | Performed by: NURSE PRACTITIONER

## 2023-06-09 PROCEDURE — 94003 VENT MGMT INPAT SUBQ DAY: CPT

## 2023-06-09 PROCEDURE — 82565 ASSAY OF CREATININE: CPT | Performed by: NURSE PRACTITIONER

## 2023-06-09 PROCEDURE — 83735 ASSAY OF MAGNESIUM: CPT | Performed by: PSYCHIATRY & NEUROLOGY

## 2023-06-09 PROCEDURE — 25000003 PHARM REV CODE 250: Performed by: PSYCHIATRY & NEUROLOGY

## 2023-06-09 PROCEDURE — 85730 THROMBOPLASTIN TIME PARTIAL: CPT | Performed by: NURSE PRACTITIONER

## 2023-06-09 PROCEDURE — 25000003 PHARM REV CODE 250: Performed by: STUDENT IN AN ORGANIZED HEALTH CARE EDUCATION/TRAINING PROGRAM

## 2023-06-09 PROCEDURE — C9113 INJ PANTOPRAZOLE SODIUM, VIA: HCPCS | Performed by: STUDENT IN AN ORGANIZED HEALTH CARE EDUCATION/TRAINING PROGRAM

## 2023-06-09 PROCEDURE — 82140 ASSAY OF AMMONIA: CPT | Performed by: NURSE PRACTITIONER

## 2023-06-09 PROCEDURE — 99900035 HC TECH TIME PER 15 MIN (STAT)

## 2023-06-09 PROCEDURE — 99291 PR CRITICAL CARE, E/M 30-74 MINUTES: ICD-10-PCS | Mod: GC,,, | Performed by: PSYCHIATRY & NEUROLOGY

## 2023-06-09 PROCEDURE — 25000003 PHARM REV CODE 250: Performed by: NURSE PRACTITIONER

## 2023-06-09 PROCEDURE — 27000221 HC OXYGEN, UP TO 24 HOURS

## 2023-06-09 PROCEDURE — 99291 CRITICAL CARE FIRST HOUR: CPT | Mod: GC,,, | Performed by: PSYCHIATRY & NEUROLOGY

## 2023-06-09 PROCEDURE — 82803 BLOOD GASES ANY COMBINATION: CPT

## 2023-06-09 PROCEDURE — 99900026 HC AIRWAY MAINTENANCE (STAT)

## 2023-06-09 PROCEDURE — 85025 COMPLETE CBC W/AUTO DIFF WBC: CPT | Performed by: NURSE PRACTITIONER

## 2023-06-09 PROCEDURE — 84100 ASSAY OF PHOSPHORUS: CPT | Performed by: PSYCHIATRY & NEUROLOGY

## 2023-06-09 PROCEDURE — 94761 N-INVAS EAR/PLS OXIMETRY MLT: CPT

## 2023-06-09 RX ADMIN — SODIUM BICARBONATE 1300 MG: 650 TABLET ORAL at 09:06

## 2023-06-09 RX ADMIN — RIFAXIMIN 550 MG: 550 TABLET ORAL at 09:06

## 2023-06-09 RX ADMIN — INSULIN ASPART 2 UNITS: 100 INJECTION, SOLUTION INTRAVENOUS; SUBCUTANEOUS at 06:06

## 2023-06-09 RX ADMIN — LACTULOSE 200 G: 10 SOLUTION ORAL at 09:06

## 2023-06-09 RX ADMIN — FENTANYL CITRATE 50 MCG: 0.05 INJECTION, SOLUTION INTRAMUSCULAR; INTRAVENOUS at 06:06

## 2023-06-09 RX ADMIN — FOLIC ACID 1 MG: 1 TABLET ORAL at 09:06

## 2023-06-09 RX ADMIN — FENTANYL CITRATE 50 MCG: 0.05 INJECTION, SOLUTION INTRAMUSCULAR; INTRAVENOUS at 03:06

## 2023-06-09 RX ADMIN — PANTOPRAZOLE SODIUM 40 MG: 40 INJECTION, POWDER, FOR SOLUTION INTRAVENOUS at 09:06

## 2023-06-09 RX ADMIN — ARGATROBAN 0.5 MCG/KG/MIN: 250 INJECTION INTRAVENOUS at 01:06

## 2023-06-09 RX ADMIN — ARIPIPRAZOLE 5 MG: 5 TABLET ORAL at 09:06

## 2023-06-09 RX ADMIN — LEVETIRACETAM 500 MG: 100 INJECTION, SOLUTION INTRAVENOUS at 09:06

## 2023-06-09 RX ADMIN — LACTULOSE 200 G: 10 SOLUTION ORAL at 03:06

## 2023-06-09 RX ADMIN — ARGATROBAN 0.5 MCG/KG/MIN: 250 INJECTION INTRAVENOUS at 11:06

## 2023-06-09 RX ADMIN — CEFTRIAXONE 1 G: 1 INJECTION, POWDER, FOR SOLUTION INTRAMUSCULAR; INTRAVENOUS at 03:06

## 2023-06-09 RX ADMIN — Medication 100 MG: at 09:06

## 2023-06-09 RX ADMIN — FENTANYL CITRATE 50 MCG: 0.05 INJECTION, SOLUTION INTRAMUSCULAR; INTRAVENOUS at 11:06

## 2023-06-09 NOTE — PLAN OF CARE
11-9-2022 at 845am. [facility] Care Plan    POC reviewed with Marely Hamilton and family at 1400. Pt verbalized understanding. Questions and concerns addressed. No acute events today. Pt progressing toward goals. Will continue to monitor. See below and flowsheets for full assessment and VS info.             Is this a stroke patient? yes- Stroke booklet reviewed with patient, risk factors identified for patient and stroke booklet remains at bedside for ongoing education.     Neuro:  [place] Coma Scale  Best Eye Response: 4-->(E4) spontaneous  Best Motor Response: 6-->(M6) obeys commands  Best Verbal Response: 1-->(V1) none  [place] Coma Scale Score: 11  Assessment Qualifiers: patient intubated  Pupil PERRLA: no     24 hr Temp:  [98 °F (36.7 °C)-99.9 °F (37.7 °C)]     CV:   Rhythm: normal sinus rhythm  BP goals:   SBP < 180  MAP > 65    Resp:      Vent Mode: SIMV  Set Rate: 10 BPM  Oxygen Concentration (%): 40  Vt Set: 320 mL  PEEP/CPAP: 5 cmH20  Pressure Support: 10 cmH20    Plan: N/A    GI/:     Diet/Nutrition Received: tube feeding  Last Bowel Movement: 06/09/23  Voiding Characteristics: external catheter    Intake/Output Summary (Last 24 hours) at 6/9/2023 1636  Last data filed at 6/9/2023 1500  Gross per 24 hour   Intake 1232.37 ml   Output 800 ml   Net 432.37 ml     Unmeasured Output  Urine Occurrence: 0  Stool Occurrence: 1  Emesis Occurrence: 0  Pad Count: 2    Labs/Accuchecks:  Recent Labs   Lab 06/09/23  0000   WBC 4.98   RBC 2.70*   HGB 8.6*   HCT 26.9*   PLT 59*      Recent Labs   Lab 06/09/23  0000   *   K 3.4*   CO2 26   *   BUN 13   CREATININE 0.5  0.5   ALKPHOS 167*  165*   ALT 27  27   AST 34  33   BILITOT 3.6*  3.5*      Recent Labs   Lab 06/08/23 2005 06/09/23 0347 06/09/23  1450   INR 2.2*  --   --    APTT 39.3*   < > 55.8*    < > = values in this interval not displayed.    No results for input(s): CPK, CPKMB, TROPONINI, MB in the last 168 hours.    Electrolytes: N/A - electrolytes WDL  Accuchecks:  Q6H    Gtts:   argatroban in 0.9 % sod chlor 0.5 mcg/kg/min (06/09/23 0705)       LDA/Wounds:  Lines/Drains/Airways       Drain  Duration                  NG/OG Tube 06/01/23 2200 Right nostril 7 days         Rectal Tube 06/03/23 1300 6 days    Female External Urinary Catheter 06/08/23 1815 <1 day              Airway  Duration                  Airway - Non-Surgical 06/02/23 1118 Endotracheal Tube 7 days              Peripheral Intravenous Line  Duration                  Midline Catheter Insertion/Assessment  - Single Lumen 05/25/23 1005 Right basilic vein (medial side of arm) other (see comments) 15 days         Peripheral IV - Single Lumen 06/09/23 1513 18 G Anterior;Distal;Left Upper Arm <1 day         Peripheral IV - Single Lumen 06/09/23 1515 20 G Anterior;Left;Proximal Forearm <1 day                  Wounds: Yes  Wound care consulted: Yes

## 2023-06-09 NOTE — ASSESSMENT & PLAN NOTE
58F with EtOH induced hepatic cirrhosis, seizure disorder on outpatient LEV and ZNS and bipolar disorder admitted on 5/28 for persistent encephalopathy.    - UTI on admit (Proteus mirabilis), now s/p CTX course  - Hypercalcemia 2/2 lithium toxicity (Lithium changed to Abilify per Psych)  - Hepatic encephalopathy 2/2 to medication non compliance, treated with lactulose and rifaximin     MRI Brain WO was unremarkable for acute neurologic change  NH4 48.  EEG w/ frequent left hemispheric electrographic seizures lasting on average 10-30 seconds with prolonged seizures over 1.5 hours also noted.   Repeat EEGs without seizure activity x 24 hours, now resolved    - Continued admission to [facility] for airway watch   - Q1h neurochecks while in ICU  - Q1h vitals while in ICU  - HOB@30  - Keppra 500mg BID  - Thiamine replacement   - Advance TF as tolerated   - Lactulose, titrate to BM  - MAP>65  - Daily CMP, Mag, Phos - replete electrolytes PRN  - Lipid panel, A1c, Coags reviewed  - Daily CBC, transfuse PRN  - Start SubQ Heparin in when clinically indicated, currently held due thrombocytopenia   - PT/OT/SLP as appropriate  - CM/SW consult for dispo planning

## 2023-06-09 NOTE — PLAN OF CARE
Recommendations     1. Continue TF regimen of Impact Peptide 1.5 @ 40 ml/hr to provide 1440 kcal, 90 g pro, 739 ml water.   2. RD following.      Goals: Meet % EEN by RD f/u.  Nutrition Goal Status: goal met  Communication of RD Recs: other (comment) (POC)    Nuzhat Ayala RDN,LDN

## 2023-06-09 NOTE — SUBJECTIVE & OBJECTIVE
Interval History: As above.     Review of Systems   Unable to perform ROS: Intubated   Constitutional:  Positive for fever (low grade).     Objective:     Vitals:  Temp: 98.6 °F (37 °C)  Pulse: 87  Rhythm: normal sinus rhythm  BP: (!) 120/58  MAP (mmHg): 83  Resp: 14  SpO2: 98 %  Oxygen Concentration (%): 40  Vent Mode: SIMV  Set Rate: 10 BPM  Pressure Support: 10 cmH20  PEEP/CPAP: 5 cmH20  Peak Airway Pressure: 20 cmH20  Mean Airway Pressure: 7.6 cmH20  Plateau Pressure: 0 cmH20    Temp  Min: 98.6 °F (37 °C)  Max: 99.9 °F (37.7 °C)  Pulse  Min: 84  Max: 110  BP  Min: 120/58  Max: 183/81  MAP (mmHg)  Min: 83  Max: 129  Resp  Min: 11  Max: 31  SpO2  Min: 92 %  Max: 99 %  Oxygen Concentration (%)  Min: 40  Max: 40    06/08 0701 - 06/09 0700  In: 1970.5 [I.V.:7.8]  Out: 1750 [Urine:250]   Unmeasured Output  Urine Occurrence: 0  Stool Occurrence: 1  Emesis Occurrence: 0  Pad Count: 2        Physical Exam  Vitals and nursing note reviewed.   Constitutional:       General: She is not in acute distress.     Appearance: She is ill-appearing.      Comments: Opens eye spontaneously, following commands   HENT:      Head: Normocephalic and atraumatic.   Eyes:      General: Scleral icterus present.      Extraocular Movements: Extraocular movements intact.      Pupils: Pupils are equal, round, and reactive to light.   Cardiovascular:      Rate and Rhythm: Normal rate.   Pulmonary:      Comments:   Intubated and mechanically ventilated   Abdominal:      Palpations: Abdomen is soft.   Musculoskeletal:      Cervical back: Neck supple.   Skin:     General: Skin is warm.   Neurological:      Mental Status: She is alert.      Comments:   Alert  Intubated, unable to test orientation  Follows commands x4  Pupils equal, reactive  Face grossly symmetric   Moves all extremities antigravity and to commands          Medications:  Continuousargatroban in 0.9 % sod chlor, Last Rate: 0.5 mcg/kg/min (06/09/23 0705)    ScheduledARIPiprazole, 5 mg,  Daily  cefTRIAXone (ROCEPHIN) IVPB, 1 g, Q24H  folic acid, 1 mg, Daily  lactulose, 200 g, TID  levETIRAcetam (Keppra) IV (PEDS and ADULTS), 500 mg, Q12H  pantoprazole, 40 mg, BID  rifAXIMin, 550 mg, BID  sodium bicarbonate, 1,300 mg, BID  thiamine, 100 mg, Daily    PRNaluminum-magnesium hydroxide-simethicone, 30 mL, QID PRN  dextrose 10%, 12.5 g, PRN  dextrose 10%, 25 g, PRN  dextrose, 15 g, PRN  dextrose, 30 g, PRN  fentaNYL, 50 mcg, Q1H PRN  glucagon (human recombinant), 1 mg, PRN  insulin aspart U-100, 1-10 Units, Q6H PRN  magnesium oxide, 800 mg, PRN  magnesium oxide, 800 mg, PRN  melatonin, 6 mg, Nightly PRN  naloxone, 0.02 mg, PRN  ondansetron, 4 mg, Q8H PRN  potassium bicarbonate, 35 mEq, PRN  potassium bicarbonate, 50 mEq, PRN  potassium bicarbonate, 60 mEq, PRN  potassium, sodium phosphates, 2 packet, PRN  potassium, sodium phosphates, 2 packet, PRN  potassium, sodium phosphates, 2 packet, PRN  simethicone, 1 tablet, QID PRN  sodium chloride 0.9%, 10 mL, Q8H PRN      Today I personally reviewed pertinent medications, lines/drains/airways, imaging, laboratory results, microbiology results, notably:    CBC, CMP, Mg, Phos, ABG, DVT US    Diet  Diet NPO  Diet NPO

## 2023-06-09 NOTE — ASSESSMENT & PLAN NOTE
Malnutrition Type:  Context: social/environmental circumstances  Level: severe    Related to (etiology):   H/o ETOH abuse     Signs and Symptoms (as evidenced by):   Findings of NFPE, wt loss, edema present, and inadequate protein-energy intake     Malnutrition Characteristic Summary:  Weight Loss (Malnutrition):  (23% x 4 months)  Energy Intake (Malnutrition): less than or equal to 75% for greater than or equal to 1 month  Subcutaneous Fat (Malnutrition): severe depletion (suspecting)  Muscle Mass (Malnutrition): severe depletion  Fluid Accumulation (Malnutrition): other (see comments) (mild-moderate)    Interventions/Recommendations (treatment strategy):  1. Continue TF regimen of Impact Peptide 1.5 @ 40 ml/hr to provide 1440 kcal, 90 g pro, 739 ml water.   2. RD following.     Nutrition Diagnosis Status:   Continues

## 2023-06-09 NOTE — PROGRESS NOTES
[facility]  Neurocritical Care  Progress Note    Admit Date: 5/28/2023  Service Date: 06/09/2023  Length of Stay: 12    Subjective:     Chief Complaint: Non-convulsive status epilepticus    History of Present Illness: Marely Hamilton is a 58 year old female with a medical history significant for EtOH induced hepatic cirrhosis, seizure disorder on outpatient LEV and ZNS and bipolar disorder who [place] [facility] [place] a transfer from OSH for evaluation of persistent encephalopathy concerning for NCSE vs hepatic encephalopathy. History is obtained from chart as patient is unresponsive and unable to assist. Initially presented to [facility] on 5/18 for encephalopathy and thought to be multifactorial including UTI (Proteus mirabilis s/p CTX course), hypercalcemia 2/2 lithium toxicity (Lithium changed to Abilify per Psych), as well as hepatic encephalopathy secondary to medication non compliance. She was treated with lactulose and rifaximin with no improvement in her mental status. EEG showed generalized slowing as well as triphasic discharges in the left hemisphere. MRI Brain WO was unremarkable for acute neurologic change. Decision [place] [facility] [place] and ongoing evaluation and management. On arrival, mental status exam has waxed and waned with patient being intermittently verbal and following commands. NH4 48.    NCC is consulted on hospital day 4 for admission after EEG showed frequent left hemispheric electrographic seizures lasting on average 10-30 seconds with prolonged seizures over 1.5 hours also noted. Per chart review, patient home dose of LEV increased from 1000mg to 1500mg BID, ZNS increased to 200mg. Vimpat 150mg BID added per General Neurology (had not been given prior to consult). On initial evaluation, patient is not responsive to voice or pain. Upward gaze noted. Decision made to transfer patient to [facility] for higher level of care and  airway watch.       Hospital Course: 06/02/2023 Tachycardic and hypotensive, transferred for development of mostly right hemispheric status, LOC exam remains poor  Intubated for airway protection, EEG has changed to mostly include triphasics with no definite seizure activity per Dr Boss, propofol stopped and AEDs simplified to keppra 1000mg bid only, wean off pressor, give colloids with crystalloid resuscitation  06/03/2023: remains on persistant significant pressor support despite adequate hydration, problems with third spacing and may have pulmonary hypertension as well, consider trial of stress steroids if hgb stabilizes  06/04/2023: levo down to 0.08mcg, start and advance TFs, vaso at 0.04U, ammonia has been stable, slight rise in tbili, check direct bili, hgb and plts improved, no clear source of bleeding, no source of bleeding on CT abdomen, consider superimposed hemolysis  06/05/2023 NAEON. Neurologic exam continues to improve, following commands in all extremities. Levo 0.02, weaning as able. Vaso discontinued, MAP>65. Daily SBT, monitor and hold TF at midnight for possible extubation tomorrow. Hemolysis work up ongoing, trending CBC. Continue CTX for SBP prophylaxis.   06/06/2023 Awake and following commands. SBT with low tidal volumes. Remains intubated for airway protection at this time with possible extubation tomorrow with continued neurologic improvement. Levo discontinued, maintaining SBP<65. Concern for hemolytic anemia related to autoimmune process vs hepatic dysfunction (elevated reticulocyte count and decreased haptoglobin).  06/07/2023 Rested on AC overnight due to low TV and fatigue. SBT with pressure support 10/5 this am, weaning ventilator as tolerated. Continue tube feeds with goal to optimize nutrition. Ammonia elevated, continue lactulose to encourage bowel movement.   06/08/2023 Failed SBT due to apnea. Some breaths on SIMV. Awake and following commands. Ammonia trending down (44) with  BMs, continue lactulose. Advance nutritional support with goal to increase strength towards extubation. Plt transfusions PRN. Cryo for fibrinogen <100.  06/09/2023 Venous US with R brachial and femoral DVT. Dicussed with Hematology with recommendation to start Argatroban with plts>50. Daily SBT with apnea, maintain on SIMV with backup rate of 10. Hyperammonemia resolved.       Interval History: As above.     Review of Systems   Unable to perform ROS: Intubated   Constitutional:  Positive for fever (low grade).     Objective:     Vitals:  Temp: 98.6 °F (37 °C)  Pulse: 87  Rhythm: normal sinus rhythm  BP: (!) 120/58  MAP (mmHg): 83  Resp: 14  SpO2: 98 %  Oxygen Concentration (%): 40  Vent Mode: SIMV  Set Rate: 10 BPM  Pressure Support: 10 cmH20  PEEP/CPAP: 5 cmH20  Peak Airway Pressure: 20 cmH20  Mean Airway Pressure: 7.6 cmH20  Plateau Pressure: 0 cmH20    Temp  Min: 98.6 °F (37 °C)  Max: 99.9 °F (37.7 °C)  Pulse  Min: 84  Max: 110  BP  Min: 120/58  Max: 183/81  MAP (mmHg)  Min: 83  Max: 129  Resp  Min: 11  Max: 31  SpO2  Min: 92 %  Max: 99 %  Oxygen Concentration (%)  Min: 40  Max: 40    06/08 0701 - 06/09 0700  In: 1970.5 [I.V.:7.8]  Out: 1750 [Urine:250]   Unmeasured Output  Urine Occurrence: 0  Stool Occurrence: 1  Emesis Occurrence: 0  Pad Count: 2        Physical Exam  Vitals and nursing note reviewed.   Constitutional:       General: She is not in acute distress.     Appearance: She is ill-appearing.      Comments: Opens eye spontaneously, following commands   HENT:      Head: Normocephalic and atraumatic.   Eyes:      General: Scleral icterus present.      Extraocular Movements: Extraocular movements intact.      Pupils: Pupils are equal, round, and reactive to light.   Cardiovascular:      Rate and Rhythm: Normal rate.   Pulmonary:      Comments:   Intubated and mechanically ventilated   Abdominal:      Palpations: Abdomen is soft.   Musculoskeletal:      Cervical back: Neck supple.   Skin:     General: Skin  is warm.   Neurological:      Mental Status: She is alert.      Comments:   Alert  Intubated, unable to test orientation  Follows commands x4  Pupils equal, reactive  Face grossly symmetric   Moves all extremities antigravity and to commands          Medications:  Continuousargatroban in 0.9 % sod chlor, Last Rate: 0.5 mcg/kg/min (06/09/23 0705)    ScheduledARIPiprazole, 5 mg, Daily  cefTRIAXone (ROCEPHIN) IVPB, 1 g, Q24H  folic acid, 1 mg, Daily  lactulose, 200 g, TID  levETIRAcetam (Keppra) IV (PEDS and ADULTS), 500 mg, Q12H  pantoprazole, 40 mg, BID  rifAXIMin, 550 mg, BID  sodium bicarbonate, 1,300 mg, BID  thiamine, 100 mg, Daily    PRNaluminum-magnesium hydroxide-simethicone, 30 mL, QID PRN  dextrose 10%, 12.5 g, PRN  dextrose 10%, 25 g, PRN  dextrose, 15 g, PRN  dextrose, 30 g, PRN  fentaNYL, 50 mcg, Q1H PRN  glucagon (human recombinant), 1 mg, PRN  insulin aspart U-100, 1-10 Units, Q6H PRN  magnesium oxide, 800 mg, PRN  magnesium oxide, 800 mg, PRN  melatonin, 6 mg, Nightly PRN  naloxone, 0.02 mg, PRN  ondansetron, 4 mg, Q8H PRN  potassium bicarbonate, 35 mEq, PRN  potassium bicarbonate, 50 mEq, PRN  potassium bicarbonate, 60 mEq, PRN  potassium, sodium phosphates, 2 packet, PRN  potassium, sodium phosphates, 2 packet, PRN  potassium, sodium phosphates, 2 packet, PRN  simethicone, 1 tablet, QID PRN  sodium chloride 0.9%, 10 mL, Q8H PRN      Today I personally reviewed pertinent medications, lines/drains/airways, imaging, laboratory results, microbiology results, notably:    CBC, CMP, Mg, Phos, ABG, DVT US    Diet  Diet NPO  Diet NPO          Assessment/Plan:     Neuro  * Non-convulsive status epilepticus  58F with EtOH induced hepatic cirrhosis, seizure disorder on outpatient LEV and ZNS and bipolar disorder admitted on 5/28 for persistent encephalopathy.    - UTI on admit (Proteus mirabilis), now s/p CTX course  - Hypercalcemia 2/2 lithium toxicity (Lithium changed to Abilify per Psych)  - Hepatic  encephalopathy 2/2 to medication non compliance, treated with lactulose and rifaximin     MRI Brain WO was unremarkable for acute neurologic change  NH4 48.  EEG w/ frequent left hemispheric electrographic seizures lasting on average 10-30 seconds with prolonged seizures over 1.5 hours also noted.   Repeat EEGs without seizure activity x 24 hours, now resolved    - Continued admission to [facility] for airway watch   - Q1h neurochecks while in ICU  - Q1h vitals while in ICU  - HOB@30  - Keppra 500mg BID  - Thiamine replacement   - Advance TF as tolerated   - Lactulose, titrate to BM  - MAP>65  - Daily CMP, Mag, Phos - replete electrolytes PRN  - Lipid panel, A1c, Coags reviewed  - Daily CBC, transfuse PRN  - Start SubQ Heparin in when clinically indicated, currently held due thrombocytopenia   - PT/OT/SLP as appropriate  - CM/SW consult for dispo planning    Acute encephalopathy  Multifactorial   See Non-convulsive status epilepticus    Breakthrough seizure  See Non-convulsive status epilepticus    Psychiatric  Bipolar 1 disorder  See Non-convulsive status epilepticus    Alcohol abuse, in remission  See Non-convulsive status epilepticus    Pulmonary  Acute hypoxemic respiratory failure  Intubated today for airway protection  Difficult intubation due to far anterior airway    Cardiac/Vascular  Hypotension (arterial)  Unclear etiology  First suggestion was decreased intravascular volume plus splanchnic dilataion  Responded partially to aggressive crystalloid and colloid hydration  S/p stress dose steroids   No clear signs to suggest sepsis at present  Required pressor support, d/c'd 6/6    Renal/  Hypernatremia  Daily CMP  Improved with free water administration, RESOLVED     Hematology  Acute deep vein thrombosis (DVT) of brachial vein of right upper extremity  Noted on 6/8  Argatroban per Heme recs    DVT of deep femoral vein, right  Noted on 6/8  Argatroban per Heme recs    Thrombocytopenia  Likely secondary to hepatic  cirrhosis and development of splenomegaly  Has worsened and associated with hgb drop, transfuse to >50K  No signs of bleeding in GI tract or on CT, hemolysis on labs  Hematology consulted     Oncology  Anemia  Chronic  Hemolysis work up pending, Hematology consulted  Trend CBC    Macrocytic anemia  Daily CBC    Endocrine  Hyperammonemia  Stable on current medical regimen   See Non-convulsive status epilepticus      GI  Acute liver failure without hepatic coma  GI/Hepatology following    Hepatic cirrhosis  Continue Lactulose and Rifaximin   Ammonia elevated but downtrending with bowel reg, advanced TF   Follow coags and fibrinogen    SBP prophylaxis with ceftriaxone x7 days total    Hepatic encephalopathy  Continue lactulose   See Non-convulsive status epilepticus          The patient is being Prophylaxed for:  Venous Thromboembolism with: Mechanical or Chemical  Stress Ulcer with: H2B  Ventilator Pneumonia with: chlorhexidine oral care    Activity Orders          Diet NPO: NPO starting at 06/01 1055    Turn patient starting at 05/28 6421    Progressive Mobility Protocol (mobilize patient to their highest level of functioning at least twice daily) starting at 05/28 2000        Full Code    Cameron Yadav MD  Neurocritical Care  [facility]

## 2023-06-09 NOTE — TREATMENT PLAN
Hepatology Treatment Plan    Marely Hamilton is a 58 y.o. female admitted to hospital 5/28/2023 (Hospital Day: 13) due to Non-convulsive status epilepticus.     Interval History  Remains intubated    Objective  Temp:  [98.6 °F (37 °C)-99.9 °F (37.7 °C)] 98.6 °F (37 °C) (06/09 0700)  Pulse:  [] 88 (06/09 0754)  BP: (120-177)/(58-78) 155/68 (06/09 0700)  Resp:  [11-31] 16 (06/09 0754)  SpO2:  [93 %-99 %] 95 % (06/09 0754)    General: Intubated  female; no acute distress.   HEENT: OG in place containing clear brown output.   Abdomen: Non-distended. Soft. Rectal tube in place containing liquid brown output.   MSK: No edema.     Laboratory    Lab Results   Component Value Date    WBC 4.98 06/09/2023    HGB 8.6 (L) 06/09/2023    HCT 26.9 (L) 06/09/2023     (H) 06/09/2023    PLT 59 (L) 06/09/2023       Lab Results   Component Value Date     (H) 06/09/2023    K 3.4 (L) 06/09/2023     (H) 06/09/2023    CO2 26 06/09/2023    BUN 13 06/09/2023    CREATININE 0.5 06/09/2023    CREATININE 0.5 06/09/2023    CALCIUM 7.9 (L) 06/09/2023       Lab Results   Component Value Date    ALBUMIN 2.5 (L) 06/09/2023    ALBUMIN 2.4 (L) 06/09/2023    ALT 27 06/09/2023    ALT 27 06/09/2023    AST 33 06/09/2023    AST 34 06/09/2023     (H) 06/02/2020    ALKPHOS 165 (H) 06/09/2023    ALKPHOS 167 (H) 06/09/2023    BILITOT 3.5 (H) 06/09/2023    BILITOT 3.6 (H) 06/09/2023       Lab Results   Component Value Date    INR 2.2 (H) 06/08/2023    INR 2.2 (H) 06/08/2023    INR 2.1 (H) 06/07/2023       MELD-Na: 20 at 6/9/2023 12:00 AM  MELD: 20 at 6/9/2023 12:00 AM  Calculated from:  Serum Creatinine: 0.5 mg/dL (Using min of 1 mg/dL) at 6/9/2023 12:00 AM  Serum Sodium: 147 mmol/L (Using max of 137 mmol/L) at 6/9/2023 12:00 AM  Total Bilirubin: 3.5 mg/dL at 6/9/2023 12:00 AM  INR(ratio): 2.2 at 6/8/2023  8:05 PM      Assessment  This is a 58 year old female with PMH significant for ETOH cirrhosis (associated with  ascites and encephalopathy; declined for transplant here in 12/2015 due to malnutrition), bipolar disorder (on Lithium), and unspecified seizure disorder (on AEDs) who was admitted to [facility] on 05/28 as a transfer from [facility] for management of recurrent hepatic encephalopathy following initial admission on 05/18 for acute onset of encephalopathy associated with UTI secondary to Proteus, hypernatremia, and hypercalcemia. She is status post treatment for UTI and electrolyte derangements without improvement in encephalopathy; CT and MRI brain unremarkable and initial EEG at [place] negative for seizures. She is status post evaluation by psychiatry here with low suspicion for psychiatric disorder contributing to encephalopathy. On chart review, patient noted to have recurrent monthly admissions since 01/2023 for hepatic encephalopathy suspected from non-compliance with Lactulose, however her continued encephalopathy since admission seemed atypical for purely hepatic component given re-initiation of Lactulose. Repeat EEG obtained on 05/31 concerning for frequent focal seizure activity approaching status epilepticus; AED's increased and patient transferred to neuro.ICU on 06/01 for closer monitoring with eventual need for intubation for airway protection on 06/02. EEG as of 06/02 without recurrent seizures and more consistent with severe encephalopathy. Patient now with progressive anemia and coagulopathy, but without overt signs of bleeding.      Recommendations:   -Agree with Ceftriaxone for SBP coverage given shock; repeat blood cultures.    -Follow-up 24 hour urine copper to rule out Ronnie's disease.   -Thiamine supplement daily given low Niacin levels.   -Lactulose TID and Rifaximin BID; titrate Lactulose to 3-4 bowel movements daily. If no effect with oral, then use Lactulose enemas.   -Minimize use of medications that may precipitate encephalopathy (e.g. opioids and benzos).   -CMP and INR daily.    -Despite MELD score of 20, no plans for re-initiation of transplant evaluation given encephalopathy limiting ability to confirm social history with patient and ETOH cessation. Follow-up repeat PETH. We will sign off at this time as we are not able to obtain history since patient remains intubated, please re-contact when patient is extubated.    Thank you for involving us in the care of Marely Hamilton. Please call with any additional concerns or questions.        Benedicto Santillan MD, PGY-V  Gastroenterology Fellow  [facility]

## 2023-06-09 NOTE — PLAN OF CARE
[facility] Care Plan    POC reviewed with Marely Hamilton at 0300. No acute events overnight. Pt progressing toward goals. Will continue to monitor. See below and flowsheets for full assessment and VS info.           Is this a stroke patient? no    Neuro:  [place] Coma Scale  Best Eye Response: 4-->(E4) spontaneous  Best Motor Response: 6-->(M6) obeys commands  Best Verbal Response: 1-->(V1) none  Cheryl Coma Scale Score: 11  Assessment Qualifiers: patient intubated  Pupil PERRLA: no     24hr Temp:  [98.8 °F (37.1 °C)-99.9 °F (37.7 °C)]     CV:   Rhythm: normal sinus rhythm  BP goals:   SBP < 180  MAP > 65    Resp:      Vent Mode: SIMV  Set Rate: 14 BPM  Oxygen Concentration (%): 40  Vt Set: 320 mL  PEEP/CPAP: 5 cmH20  Pressure Support: 12 cmH20    Plan: wean to extubate    GI/:     Diet/Nutrition Received: tube feeding  Last Bowel Movement: 06/08/23  Voiding Characteristics: urethral catheter (bladder)    Intake/Output Summary (Last 24 hours) at 6/9/2023 0655  Last data filed at 6/9/2023 0601  Gross per 24 hour   Intake 1970.45 ml   Output 1750 ml   Net 220.45 ml     Unmeasured Output  Urine Occurrence: 0  Stool Occurrence: 1  Emesis Occurrence: 0  Pad Count: 2    Labs/Accuchecks:  Recent Labs   Lab 06/09/23  0000   WBC 4.98   RBC 2.70*   HGB 8.6*   HCT 26.9*   PLT 59*      Recent Labs   Lab 06/09/23  0000   *   K 3.4*   CO2 26   *   BUN 13   CREATININE 0.5  0.5   ALKPHOS 167*  165*   ALT 27  27   AST 34  33   BILITOT 3.6*  3.5*      Recent Labs   Lab 06/08/23 2005 06/09/23  0347   INR 2.2*  --    APTT 39.3* 64.6*    No results for input(s): CPK, CPKMB, TROPONINI, MB in the last 168 hours.    Electrolytes: N/A - electrolytes WDL  Accuchecks: Q6H    Gtts:   argatroban in 0.9 % sod chlor 0.5 mcg/kg/min (06/09/23 0601)       LDA/Wounds:  Lines/Drains/Airways       Drain  Duration                  NG/OG Tube 06/01/23 2200 Right nostril 7 days         Rectal Tube 06/03/23 1300 5 days    Female External  Urinary Catheter 06/08/23 1815 <1 day              Airway  Duration                  Airway - Non-Surgical 06/02/23 1118 Endotracheal Tube 6 days              Peripheral Intravenous Line  Duration                  Midline Catheter Insertion/Assessment  - Single Lumen 05/25/23 1005 Right basilic vein (medial side of arm) other (see comments) 14 days         Peripheral IV - Single Lumen 06/07/23 1611 20 G Anterior;Left Hand 1 day         Peripheral IV - Single Lumen 06/07/23 1611 20 G Anterior;Left Upper Arm 1 day                  Wounds: Yes  Wound care consulted: Yes      Problem: Adult Inpatient Plan of Care  Goal: Plan of Care Review  Outcome: Ongoing, Progressing  Goal: Patient-Specific Goal (Individualized)  Outcome: Ongoing, Progressing  Goal: Absence of Hospital-Acquired Illness or Injury  Outcome: Ongoing, Progressing  Goal: Optimal Comfort and Wellbeing  Outcome: Ongoing, Progressing     Problem: Oral Intake Inadequate (Acute Kidney Injury/Impairment)  Goal: Optimal Nutrition Intake  Outcome: Ongoing, Progressing     Problem: Infection  Goal: Absence of Infection Signs and Symptoms  Outcome: Ongoing, Progressing     Problem: Communication Impairment (Mechanical Ventilation, Invasive)  Goal: Effective Communication  Outcome: Ongoing, Progressing     Problem: Inability to Wean (Mechanical Ventilation, Invasive)  Goal: Mechanical Ventilation Liberation  Outcome: Ongoing, Progressing     Problem: Communication Impairment (Artificial Airway)  Goal: Effective Communication  Outcome: Ongoing, Progressing

## 2023-06-09 NOTE — CARE UPDATE
Attempted ABG and ran blood gas. Blood was Venous, contacted care team. Team stated to leave pt alone and a new ABG will be obtained by day shift.

## 2023-06-09 NOTE — PROGRESS NOTES
[facility] - Neuro Critical Care  Adult Nutrition  Progress Note    SUMMARY       Recommendations    1. Continue TF regimen of Impact Peptide 1.5 @ 40 ml/hr to provide 1440 kcal, 90 g pro, 739 ml water.   2. RD following.     Goals: Meet % EEN by RD f/u.  Nutrition Goal Status: goal met  Communication of RD Recs: other (comment) (POC)    Assessment and Plan    Severe protein-calorie malnutrition  Malnutrition Type:  Context: social/environmental circumstances  Level: severe    Related to (etiology):   H/o ETOH abuse     Signs and Symptoms (as evidenced by):   Findings of NFPE, wt loss, edema present, and inadequate protein-energy intake     Malnutrition Characteristic Summary:  Weight Loss (Malnutrition):  (23% x 4 months)  Energy Intake (Malnutrition): less than or equal to 75% for greater than or equal to 1 month  Subcutaneous Fat (Malnutrition): severe depletion (suspecting)  Muscle Mass (Malnutrition): severe depletion  Fluid Accumulation (Malnutrition); other (see comments) (mild-moderate)    Interventions/Recommendations (treatment strategy):  1. Continue TF regimen of Impact Peptide 1.5 @ 40 ml/hr to provide 1440 kcal, 90 g pro, 739 ml water.   2. RD following.     Nutrition Diagnosis Status:   Continues    Malnutrition Assessment  Malnutrition Context: social/environmental circumstances  Malnutrition Level: severe    Weight Loss (Malnutrition):  (23% x 4 months)  Energy Intake (Malnutrition): less than or equal to 75% for greater than or equal to 1 month  Subcutaneous Fat (Malnutrition): severe depletion (suspecting)  Muscle Mass (Malnutrition): severe depletion   Orbital Region (Subcutaneous Fat Loss): severe depletion  Upper Arm Region (Subcutaneous Fat Loss): severe depletion   [place] Region (Muscle Loss): severe depletion  Clavicle Bone Region (Muscle Loss): severe depletion  Clavicle and Acromion Bone Region (Muscle Loss): severe depletion  Scapular Bone Region (Muscle Loss): severe  "depletion  Patellar Region (Muscle Loss): severe depletion  Anterior Thigh Region (Muscle Loss): severe depletion  Posterior Calf Region (Muscle Loss): severe depletion     Reason for Assessment    Reason For Assessment: RD follow-up  Diagnosis: other (see comments) (Non-convulsive status epilepticus)  Relevant Medical History: addiction to drugs, ETOH abuse, anemia, anxiety, behavioral disorder, bipolar disorder, cirrhosis, hypotension, glaucoma, hx of seizure  Interdisciplinary Rounds: did not attend  General Information Comments: RD f/u. Unable to speak with pt 2/2 remaining intubated. RD suspecting poor PO intake PTA. NPO- noted TF regimen currently running.  Wt on 5/5/23 was 99# and # - RD suspecting wt gain 2/2 +3 (moderate) edema in arms present and +2 (mild) edema present in BLEs. Wt on 2/8/23 was 129#, using wt of 99# from 5/5/23- indicates 23% wt loss x 4 months. NFPE completed 5/25 and found severe fat and muscle wasting. Severe malnutrition dxn continues. LBM 6/8.  Nutrition Discharge Planning: Pending clinical course    Nutrition Risk Screen    Nutrition Risk Screen: tube feeding or parenteral nutrition, difficulty chewing/swallowing, unintentional loss of 10 lbs or more in the past 2 months    Nutrition/Diet History    Spiritual, Cultural Beliefs, Presybeterian Practices, Values that Affect Care: no  Food Allergies: NKFA  Factors Affecting Nutritional Intake: NPO, on mechanical ventilation, excessive alcohol intake, difficulty/impaired swallowing    Anthropometrics    Temp: 98.6 °F (37 °C)  Height: 5' 2" (157.5 cm)  Height (inches): 62 in  Weight Method: Bed Scale  Weight: 59.9 kg (132 lb)  Weight (lb): 132 lb  Ideal Body Weight (IBW), Female: 110 lb  % Ideal Body Weight, Female (lb): 120 %  BMI (Calculated): 24.1  BMI Grade: 18.5-24.9 - normal    Lab/Procedures/Meds    Pertinent Labs Reviewed: reviewed  Pertinent Labs Comments: Glucose 153, A1C <4.0, Sodium 147, Potassium 3.4, Ca 7.9, Al Phs 165, T " Bili 3.5  Pertinent Medications Reviewed: reviewed  Pertinent Medications Comments: folic acid, lactulose, pantoprazole, thiamine    Estimated/Assessed Needs    Weight Used For Calorie Calculations: 59.9 kg (132 lb 0.9 oz)  Energy Calorie Requirements (kcal): 1509 kcal  Energy Need Method: [place] (x 1.3 SF)  Protein Requirements: 90 g (1.5 g/kg)  Weight Used For Protein Calculations: 59.9 kg (132 lb 0.9 oz)  Fluid Requirements (mL): 1 mL/kcal or fluid per MD  Estimated Fluid Requirement Method: RDA Method  RDA Method (mL): 1509    Nutrition Prescription Ordered    Current Diet Order: NPO  Current Nutrition Support Formula Ordered: Impact Peptide 1.5  Current Nutrition Support Rate Ordered: 40 (ml)  Current Nutrition Support Frequency Ordered: mL/hr    Evaluation of Received Nutrient/Fluid Intake    Enteral Calories (kcal): 1440  Enteral Protein (gm): 90  Enteral (Free Water) Fluid (mL): 739  % Kcal Needs: 95%  % Protein Needs: 100%  I/O: +8.0 L since admit  Energy Calories Required: meeting needs  Protein Required: meeting needs  Tolerance: tolerating  % Intake of Estimated Energy Needs: 95%  % Meal Intake: NPO    Nutrition Risk    Level of Risk/Frequency of Follow-up:  (1 time/week)     Monitor and Evaluation    Food and Nutrient Intake: enteral nutrition intake  Food and Nutrient Adminstration: enteral and parenteral nutrition administration  Physical Activity and Function: factors affecting access to physical activity  Anthropometric Measurements: height/length, weight, weight change, body mass index  Biochemical Data, Medical Tests and Procedures: electrolyte and renal panel, gastrointestinal profile, glucose/endocrine profile, inflammatory profile, lipid profile  Nutrition-Focused Physical Findings: overall appearance, extremities, muscles and bones, head and eyes, skin     Nutrition Follow-Up    RD Follow-up?: Yes    Nuzhat Colon RDN,LDN

## 2023-06-10 LAB
ACANTHOCYTES BLD QL SMEAR: PRESENT
ALBUMIN SERPL BCP-MCNC: 2.2 G/DL (ref 3.5–5.2)
ALLENS TEST: ABNORMAL
ALP SERPL-CCNC: 172 U/L (ref 55–135)
ALT SERPL W/O P-5'-P-CCNC: 23 U/L (ref 10–44)
AMMONIA PLAS-SCNC: 42 UMOL/L (ref 10–50)
ANION GAP SERPL CALC-SCNC: 7 MMOL/L (ref 8–16)
ANISOCYTOSIS BLD QL SMEAR: SLIGHT
ANISOCYTOSIS BLD QL SMEAR: SLIGHT
APTT PPP: 71.7 SEC (ref 21–32)
AST SERPL-CCNC: 30 U/L (ref 10–40)
BASO STIPL BLD QL SMEAR: ABNORMAL
BASO STIPL BLD QL SMEAR: ABNORMAL
BASOPHILS # BLD AUTO: 0.02 K/UL (ref 0–0.2)
BASOPHILS # BLD AUTO: 0.02 K/UL (ref 0–0.2)
BASOPHILS NFR BLD: 0.3 % (ref 0–1.9)
BASOPHILS NFR BLD: 0.4 % (ref 0–1.9)
BILIRUB SERPL-MCNC: 3.1 MG/DL (ref 0.1–1)
BUN SERPL-MCNC: 18 MG/DL (ref 6–20)
CALCIUM SERPL-MCNC: 8.2 MG/DL (ref 8.7–10.5)
CHLORIDE SERPL-SCNC: 120 MMOL/L (ref 95–110)
CO2 SERPL-SCNC: 24 MMOL/L (ref 23–29)
CREAT SERPL-MCNC: 0.4 MG/DL (ref 0.5–1.4)
DACRYOCYTES BLD QL SMEAR: ABNORMAL
DELSYS: ABNORMAL
DIFFERENTIAL METHOD: ABNORMAL
DIFFERENTIAL METHOD: ABNORMAL
DOHLE BOD BLD QL SMEAR: PRESENT
DOHLE BOD BLD QL SMEAR: PRESENT
EOSINOPHIL # BLD AUTO: 0.2 K/UL (ref 0–0.5)
EOSINOPHIL # BLD AUTO: 0.2 K/UL (ref 0–0.5)
EOSINOPHIL NFR BLD: 3 % (ref 0–8)
EOSINOPHIL NFR BLD: 3.6 % (ref 0–8)
ERYTHROCYTE [DISTWIDTH] IN BLOOD BY AUTOMATED COUNT: 25.9 % (ref 11.5–14.5)
ERYTHROCYTE [DISTWIDTH] IN BLOOD BY AUTOMATED COUNT: 26 % (ref 11.5–14.5)
ERYTHROCYTE [SEDIMENTATION RATE] IN BLOOD BY WESTERGREN METHOD: 10 MM/H
EST. GFR  (NO RACE VARIABLE): >60 ML/MIN/1.73 M^2
FACT X PPP CHRO-ACNC: 0.56 IU/ML (ref 0.3–0.7)
FACT X PPP CHRO-ACNC: 0.72 IU/ML (ref 0.3–0.7)
FACT X PPP CHRO-ACNC: <0.1 IU/ML (ref 0.3–0.7)
FIBRINOGEN PPP-MCNC: 127 MG/DL (ref 182–400)
FIBRINOGEN PPP-MCNC: 135 MG/DL (ref 182–400)
FIO2: 40
GLUCOSE SERPL-MCNC: 161 MG/DL (ref 70–110)
HCO3 UR-SCNC: 23.6 MMOL/L (ref 24–28)
HCT VFR BLD AUTO: 26.3 % (ref 37–48.5)
HCT VFR BLD AUTO: 26.6 % (ref 37–48.5)
HGB BLD-MCNC: 8 G/DL (ref 12–16)
HGB BLD-MCNC: 8 G/DL (ref 12–16)
HYPOCHROMIA BLD QL SMEAR: ABNORMAL
HYPOCHROMIA BLD QL SMEAR: ABNORMAL
IMM GRANULOCYTES # BLD AUTO: 0.01 K/UL (ref 0–0.04)
IMM GRANULOCYTES # BLD AUTO: 0.01 K/UL (ref 0–0.04)
IMM GRANULOCYTES NFR BLD AUTO: 0.1 % (ref 0–0.5)
IMM GRANULOCYTES NFR BLD AUTO: 0.2 % (ref 0–0.5)
INR PPP: 5.4 (ref 0.8–1.2)
IP: 15
IT: 1
LYMPHOCYTES # BLD AUTO: 1 K/UL (ref 1–4.8)
LYMPHOCYTES # BLD AUTO: 1.5 K/UL (ref 1–4.8)
LYMPHOCYTES NFR BLD: 19.7 % (ref 18–48)
LYMPHOCYTES NFR BLD: 22.2 % (ref 18–48)
MAGNESIUM SERPL-MCNC: 1.8 MG/DL (ref 1.6–2.6)
MAGNESIUM SERPL-MCNC: 1.8 MG/DL (ref 1.6–2.6)
MCH RBC QN AUTO: 31.4 PG (ref 27–31)
MCH RBC QN AUTO: 31.9 PG (ref 27–31)
MCHC RBC AUTO-ENTMCNC: 30.1 G/DL (ref 32–36)
MCHC RBC AUTO-ENTMCNC: 30.4 G/DL (ref 32–36)
MCV RBC AUTO: 104 FL (ref 82–98)
MCV RBC AUTO: 105 FL (ref 82–98)
MODE: ABNORMAL
MONOCYTES # BLD AUTO: 0.5 K/UL (ref 0.3–1)
MONOCYTES # BLD AUTO: 0.6 K/UL (ref 0.3–1)
MONOCYTES NFR BLD: 9.1 % (ref 4–15)
MONOCYTES NFR BLD: 9.4 % (ref 4–15)
NEUTROPHILS # BLD AUTO: 3.6 K/UL (ref 1.8–7.7)
NEUTROPHILS # BLD AUTO: 4.3 K/UL (ref 1.8–7.7)
NEUTROPHILS NFR BLD: 64.4 % (ref 38–73)
NEUTROPHILS NFR BLD: 67.6 % (ref 38–73)
NRBC BLD-RTO: 0 /100 WBC
NRBC BLD-RTO: 0 /100 WBC
OVALOCYTES BLD QL SMEAR: ABNORMAL
OVALOCYTES BLD QL SMEAR: ABNORMAL
PCO2 BLDA: 34.2 MMHG (ref 35–45)
PEEP: 5
PH SMN: 7.45 [PH] (ref 7.35–7.45)
PHOSPHATE SERPL-MCNC: 1.9 MG/DL (ref 2.7–4.5)
PHOSPHATE SERPL-MCNC: 2.2 MG/DL (ref 2.7–4.5)
PLATELET # BLD AUTO: 56 K/UL (ref 150–450)
PLATELET # BLD AUTO: 94 K/UL (ref 150–450)
PLATELET BLD QL SMEAR: ABNORMAL
PLATELET BLD QL SMEAR: ABNORMAL
PMV BLD AUTO: 11.8 FL (ref 9.2–12.9)
PMV BLD AUTO: 9.9 FL (ref 9.2–12.9)
PO2 BLDA: 93 MMHG (ref 80–100)
POC BE: 0 MMOL/L
POC SATURATED O2: 98 % (ref 95–100)
POC TCO2: 25 MMOL/L (ref 23–27)
POCT GLUCOSE: 121 MG/DL (ref 70–110)
POCT GLUCOSE: 122 MG/DL (ref 70–110)
POCT GLUCOSE: 169 MG/DL (ref 70–110)
POIKILOCYTOSIS BLD QL SMEAR: SLIGHT
POIKILOCYTOSIS BLD QL SMEAR: SLIGHT
POLYCHROMASIA BLD QL SMEAR: ABNORMAL
POLYCHROMASIA BLD QL SMEAR: ABNORMAL
POTASSIUM SERPL-SCNC: 2.9 MMOL/L (ref 3.5–5.1)
POTASSIUM SERPL-SCNC: 4.7 MMOL/L (ref 3.5–5.1)
PROT SERPL-MCNC: 4.5 G/DL (ref 6–8.4)
PROTHROMBIN TIME: 52 SEC (ref 9–12.5)
RBC # BLD AUTO: 2.51 M/UL (ref 4–5.4)
RBC # BLD AUTO: 2.55 M/UL (ref 4–5.4)
SAMPLE: ABNORMAL
SCHISTOCYTES BLD QL SMEAR: ABNORMAL
SCHISTOCYTES BLD QL SMEAR: PRESENT
SITE: ABNORMAL
SODIUM SERPL-SCNC: 151 MMOL/L (ref 136–145)
SP02: 100
SPHEROCYTES BLD QL SMEAR: ABNORMAL
TARGETS BLD QL SMEAR: ABNORMAL
TOXIC GRANULES BLD QL SMEAR: PRESENT
TOXIC GRANULES BLD QL SMEAR: PRESENT
WBC # BLD AUTO: 5.27 K/UL (ref 3.9–12.7)
WBC # BLD AUTO: 6.7 K/UL (ref 3.9–12.7)

## 2023-06-10 PROCEDURE — 85384 FIBRINOGEN ACTIVITY: CPT | Mod: 91 | Performed by: PSYCHIATRY & NEUROLOGY

## 2023-06-10 PROCEDURE — 86900 BLOOD TYPING SEROLOGIC ABO: CPT | Performed by: PHYSICIAN ASSISTANT

## 2023-06-10 PROCEDURE — 99900035 HC TECH TIME PER 15 MIN (STAT)

## 2023-06-10 PROCEDURE — 99291 PR CRITICAL CARE, E/M 30-74 MINUTES: ICD-10-PCS | Mod: ,,, | Performed by: PSYCHIATRY & NEUROLOGY

## 2023-06-10 PROCEDURE — 25000003 PHARM REV CODE 250

## 2023-06-10 PROCEDURE — 84132 ASSAY OF SERUM POTASSIUM: CPT | Performed by: PSYCHIATRY & NEUROLOGY

## 2023-06-10 PROCEDURE — 94150 VITAL CAPACITY TEST: CPT

## 2023-06-10 PROCEDURE — 84100 ASSAY OF PHOSPHORUS: CPT | Mod: 91 | Performed by: PSYCHIATRY & NEUROLOGY

## 2023-06-10 PROCEDURE — 80053 COMPREHEN METABOLIC PANEL: CPT | Performed by: PSYCHIATRY & NEUROLOGY

## 2023-06-10 PROCEDURE — 80076 HEPATIC FUNCTION PANEL: CPT | Performed by: NURSE PRACTITIONER

## 2023-06-10 PROCEDURE — 94003 VENT MGMT INPAT SUBQ DAY: CPT

## 2023-06-10 PROCEDURE — 25000003 PHARM REV CODE 250: Performed by: EMERGENCY MEDICINE

## 2023-06-10 PROCEDURE — 20000000 HC ICU ROOM

## 2023-06-10 PROCEDURE — 85520 HEPARIN ASSAY: CPT | Performed by: EMERGENCY MEDICINE

## 2023-06-10 PROCEDURE — 99900026 HC AIRWAY MAINTENANCE (STAT)

## 2023-06-10 PROCEDURE — 86920 COMPATIBILITY TEST SPIN: CPT | Performed by: ANESTHESIOLOGY

## 2023-06-10 PROCEDURE — 63600175 PHARM REV CODE 636 W HCPCS: Performed by: EMERGENCY MEDICINE

## 2023-06-10 PROCEDURE — 86920 COMPATIBILITY TEST SPIN: CPT | Performed by: STUDENT IN AN ORGANIZED HEALTH CARE EDUCATION/TRAINING PROGRAM

## 2023-06-10 PROCEDURE — 63600175 PHARM REV CODE 636 W HCPCS: Performed by: PSYCHIATRY & NEUROLOGY

## 2023-06-10 PROCEDURE — 85025 COMPLETE CBC W/AUTO DIFF WBC: CPT | Mod: 91 | Performed by: EMERGENCY MEDICINE

## 2023-06-10 PROCEDURE — 94010 BREATHING CAPACITY TEST: CPT

## 2023-06-10 PROCEDURE — 85025 COMPLETE CBC W/AUTO DIFF WBC: CPT | Mod: 91 | Performed by: NURSE PRACTITIONER

## 2023-06-10 PROCEDURE — 82140 ASSAY OF AMMONIA: CPT | Performed by: NURSE PRACTITIONER

## 2023-06-10 PROCEDURE — 63600175 PHARM REV CODE 636 W HCPCS: Performed by: STUDENT IN AN ORGANIZED HEALTH CARE EDUCATION/TRAINING PROGRAM

## 2023-06-10 PROCEDURE — 25000003 PHARM REV CODE 250: Performed by: STUDENT IN AN ORGANIZED HEALTH CARE EDUCATION/TRAINING PROGRAM

## 2023-06-10 PROCEDURE — 99291 CRITICAL CARE FIRST HOUR: CPT | Mod: ,,, | Performed by: PSYCHIATRY & NEUROLOGY

## 2023-06-10 PROCEDURE — 82803 BLOOD GASES ANY COMBINATION: CPT

## 2023-06-10 PROCEDURE — 27000221 HC OXYGEN, UP TO 24 HOURS

## 2023-06-10 PROCEDURE — 85520 HEPARIN ASSAY: CPT | Mod: 91 | Performed by: PSYCHIATRY & NEUROLOGY

## 2023-06-10 PROCEDURE — 85610 PROTHROMBIN TIME: CPT | Performed by: EMERGENCY MEDICINE

## 2023-06-10 PROCEDURE — 94761 N-INVAS EAR/PLS OXIMETRY MLT: CPT

## 2023-06-10 PROCEDURE — C9113 INJ PANTOPRAZOLE SODIUM, VIA: HCPCS | Performed by: STUDENT IN AN ORGANIZED HEALTH CARE EDUCATION/TRAINING PROGRAM

## 2023-06-10 PROCEDURE — 36600 WITHDRAWAL OF ARTERIAL BLOOD: CPT

## 2023-06-10 PROCEDURE — 85730 THROMBOPLASTIN TIME PARTIAL: CPT | Performed by: PSYCHIATRY & NEUROLOGY

## 2023-06-10 PROCEDURE — 83735 ASSAY OF MAGNESIUM: CPT | Performed by: PSYCHIATRY & NEUROLOGY

## 2023-06-10 RX ORDER — HEPARIN SODIUM,PORCINE/D5W 25000/250
0-40 INTRAVENOUS SOLUTION INTRAVENOUS CONTINUOUS
Status: DISCONTINUED | OUTPATIENT
Start: 2023-06-10 | End: 2023-06-11

## 2023-06-10 RX ORDER — LEVETIRACETAM 500 MG/1
500 TABLET ORAL 2 TIMES DAILY
Status: DISCONTINUED | OUTPATIENT
Start: 2023-06-10 | End: 2023-06-11

## 2023-06-10 RX ADMIN — FOLIC ACID 1 MG: 1 TABLET ORAL at 08:06

## 2023-06-10 RX ADMIN — POTASSIUM & SODIUM PHOSPHATES POWDER PACK 280-160-250 MG 2 PACKET: 280-160-250 PACK at 12:06

## 2023-06-10 RX ADMIN — LEVETIRACETAM 500 MG: 100 INJECTION, SOLUTION INTRAVENOUS at 08:06

## 2023-06-10 RX ADMIN — PANTOPRAZOLE SODIUM 40 MG: 40 INJECTION, POWDER, FOR SOLUTION INTRAVENOUS at 08:06

## 2023-06-10 RX ADMIN — ARIPIPRAZOLE 5 MG: 5 TABLET ORAL at 08:06

## 2023-06-10 RX ADMIN — POTASSIUM & SODIUM PHOSPHATES POWDER PACK 280-160-250 MG 2 PACKET: 280-160-250 PACK at 03:06

## 2023-06-10 RX ADMIN — LEVETIRACETAM 500 MG: 500 TABLET, FILM COATED ORAL at 08:06

## 2023-06-10 RX ADMIN — POTASSIUM BICARBONATE 60 MEQ: 391 TABLET, EFFERVESCENT ORAL at 03:06

## 2023-06-10 RX ADMIN — POTASSIUM BICARBONATE 60 MEQ: 391 TABLET, EFFERVESCENT ORAL at 05:06

## 2023-06-10 RX ADMIN — RIFAXIMIN 550 MG: 550 TABLET ORAL at 08:06

## 2023-06-10 RX ADMIN — HEPARIN SODIUM 18 UNITS/KG/HR: 10000 INJECTION, SOLUTION INTRAVENOUS at 12:06

## 2023-06-10 RX ADMIN — POTASSIUM & SODIUM PHOSPHATES POWDER PACK 280-160-250 MG 2 PACKET: 280-160-250 PACK at 08:06

## 2023-06-10 RX ADMIN — Medication 800 MG: at 08:06

## 2023-06-10 RX ADMIN — Medication 800 MG: at 03:06

## 2023-06-10 RX ADMIN — Medication 100 MG: at 08:06

## 2023-06-10 RX ADMIN — Medication 6 MG: at 01:06

## 2023-06-10 NOTE — PLAN OF CARE
[facility] Care Plan    POC reviewed with Marely Hamilton and family at 1600. Pt sister verbalized understanding. Questions and concerns addressed. No acute events today. Pt progressing toward goals. Will continue to monitor. See below and flowsheets for full assessment and VS info.     - On spont throughout shift, plan to extubate tomorrow  - FWF  started  - electrolytes replaced  - flexi in place      Is this a stroke patient? no    Neuro:  [place] Coma Scale  Best Eye Response: 4-->(E4) spontaneous  Best Motor Response: 6-->(M6) obeys commands  Best Verbal Response: 1-->(V1) none  [place] Coma Scale Score: 11  Assessment Qualifiers: patient intubated  Pupil PERRLA: no (baseline)     24 hr Temp:  [97.1 °F (36.2 °C)-98.5 °F (36.9 °C)]     CV:   Rhythm: normal sinus rhythm  BP goals:   SBP < 180  MAP > 65    Resp:      Vent Mode: Spont  Set Rate: 10 BPM  Oxygen Concentration (%): 40  Vt Set: 320 mL  PEEP/CPAP: 5 cmH20  Pressure Support: 5 cmH20    Plan: wean to extubate    GI/:     Diet/Nutrition Received: tube feeding  Last Bowel Movement: 06/10/23  Voiding Characteristics: unable to void    Intake/Output Summary (Last 24 hours) at 6/10/2023 1651  Last data filed at 6/10/2023 1600  Gross per 24 hour   Intake 2974.96 ml   Output 3825 ml   Net -850.04 ml     Unmeasured Output  Urine Occurrence: 0  Stool Occurrence: 1  Emesis Occurrence: 0  Pad Count: 2    Labs/Accuchecks:  Recent Labs   Lab 06/10/23  1223   WBC 6.70   RBC 2.51*   HGB 8.0*   HCT 26.3*   PLT 94*      Recent Labs   Lab 06/10/23  0228 06/10/23  1223   *  --    K 2.9* 4.7   CO2 24  --    *  --    BUN 18  --    CREATININE 0.4*  --    ALKPHOS 172*  --    ALT 23  --    AST 30  --    BILITOT 3.1*  --       Recent Labs   Lab 06/10/23  0634 06/10/23  1223   INR  --  5.4*   APTT 71.7*  --     No results for input(s): CPK, CPKMB, TROPONINI, MB in the last 168 hours.    Electrolytes: Electrolytes replaced  Accuchecks: Q6H    Gtts:   heparin (porcine) in  D5W 18 Units/kg/hr (06/10/23 1255)       LDA/Wounds:  Lines/Drains/Airways       Drain  Duration                  NG/OG Tube 06/01/23 2200 Right nostril 8 days         Rectal Tube 06/03/23 1300 7 days    Female External Urinary Catheter 06/10/23 1600 <1 day              Airway  Duration                  Airway - Non-Surgical 06/02/23 1118 Endotracheal Tube 8 days              Peripheral Intravenous Line  Duration                  Midline Catheter Insertion/Assessment  - Single Lumen 05/25/23 1005 Right basilic vein (medial side of arm) other (see comments) 16 days         Peripheral IV - Single Lumen 06/09/23 1513 18 G Anterior;Distal;Left Upper Arm 1 day         Peripheral IV - Single Lumen 06/09/23 1515 20 G Anterior;Left;Proximal Forearm 1 day                  Wounds: Yes  Wound care consulted: Yes

## 2023-06-10 NOTE — ASSESSMENT & PLAN NOTE
Likely secondary to hepatic cirrhosis and development of splenomegaly  Has worsened and associated with hgb drop, transfuse to >50K  No signs of bleeding in GI tract or on CT, hemolysis on labs  Hematology consulted, tested negative for HIT

## 2023-06-10 NOTE — ASSESSMENT & PLAN NOTE
58F with EtOH induced hepatic cirrhosis, seizure disorder on outpatient LEV and ZNS and bipolar disorder admitted on 5/28 for persistent encephalopathy.    - UTI on admit (Proteus mirabilis), now s/p CTX course  - Hypercalcemia 2/2 lithium toxicity (Lithium changed to Abilify per Psych)  - Hepatic encephalopathy 2/2 to medication non compliance, treated with lactulose and rifaximin     MRI Brain WO was unremarkable for acute neurologic change  NH4 48.  EEG w/ frequent left hemispheric electrographic seizures lasting on average 10-30 seconds with prolonged seizures over 1.5 hours also noted.   Repeat EEGs without seizure activity x 24 hours, now resolved    - Continued admission to [facility] for airway watch   - Q1h neurochecks while in ICU  - Q1h vitals while in ICU  - HOB@30  - Keppra 500mg BID  - Thiamine replacement   - Advance TF as tolerated   - Lactulose, titrate to BM  - MAP>65  - Daily CMP, Mag, Phos - replete electrolytes PRN  - Lipid panel, A1c, Coags reviewed  - Daily CBC, transfuse PRN  - Start SubQ Heparin in when clinically indicated, currently held due thrombocytopenia   - PT/OT/SLP as appropriate  - CM/SW consult for dispo planning

## 2023-06-10 NOTE — PROGRESS NOTES
[facility] Critical [facility]  Neurocritical Care  Progress Note    Admit Date: 5/28/2023  Service Date: 06/10/2023  Length of Stay: 13    Subjective:     Chief Complaint: Non-convulsive status epilepticus    History of Present Illness: Marely Hamilton is a 58 year old female with a medical history significant for EtOH induced hepatic cirrhosis, seizure disorder on outpatient LEV and ZNS and bipolar disorder who was admitted to [facility] [place]28 as a transfer from OSH for evaluation of persistent encephalopathy concerning for NCSE vs hepatic encephalopathy. History is obtained from chart as patient is unresponsive and unable to assist. Initially presented to [facility] on 5/18 for encephalopathy and thought to be multifactorial including UTI (Proteus mirabilis s/p CTX course), hypercalcemia 2/2 lithium toxicity (Lithium changed to Abilify per Psych), as well as hepatic encephalopathy secondary to medication non compliance. She was treated with lactulose and rifaximin with no improvement in her mental status. EEG showed generalized slowing as well as triphasic discharges in the left hemisphere. MRI Brain WO was unremarkable for acute neurologic change. Decision was made to transfer patient to [facility] [place] higher level of care and ongoing evaluation and management. On arrival, mental status exam has waxed and waned with patient being intermittently verbal and following commands. NH4 48.    NCC is consulted on hospital day 4 for admission after EEG showed frequent left hemispheric electrographic seizures lasting on average 10-30 seconds with prolonged seizures over 1.5 hours also noted. Per chart review, patient home dose of LEV increased from 1000mg to 1500mg BID, ZNS increased to 200mg. Vimpat 150mg BID added per General Neurology (had not been given prior to consult). On initial evaluation, patient is not responsive to voice or pain. Upward gaze noted. Decision made to transfer patient to [facility] for higher level of care and  airway watch.       Hospital Course: 06/02/2023 Tachycardic and hypotensive, transferred for development of mostly right hemispheric status, LOC exam remains poor  Intubated for airway protection, EEG has changed to mostly include triphasics with no definite seizure activity per Dr Boss, propofol stopped and AEDs simplified to keppra 1000mg bid only, wean off pressor, give colloids with crystalloid resuscitation  06/03/2023: remains on persistant significant pressor support despite adequate hydration, problems with third spacing and may have pulmonary hypertension as well, consider trial of stress steroids if hgb stabilizes  06/04/2023: levo down to 0.08mcg, start and advance TFs, vaso at 0.04U, ammonia has been stable, slight rise in tbili, check direct bili, hgb and plts improved, no clear source of bleeding, no source of bleeding on CT abdomen, consider superimposed hemolysis  06/05/2023 NAEON. Neurologic exam continues to improve, following commands in all extremities. Levo 0.02, weaning as able. Vaso discontinued, MAP>65. Daily SBT, monitor and hold TF at midnight for possible extubation tomorrow. Hemolysis work up ongoing, trending CBC. Continue CTX for SBP prophylaxis.   06/06/2023 Awake and following commands. SBT with low tidal volumes. Remains intubated for airway protection at this time with possible extubation tomorrow with continued neurologic improvement. Levo discontinued, maintaining SBP<65. Concern for hemolytic anemia related to autoimmune process vs hepatic dysfunction (elevated reticulocyte count and decreased haptoglobin).  06/07/2023 Rested on AC overnight due to low TV and fatigue. SBT with pressure support 10/5 this am, weaning ventilator as tolerated. Continue tube feeds with goal to optimize nutrition. Ammonia elevated, continue lactulose to encourage bowel movement.   06/08/2023 Failed SBT due to apnea. Some breaths on SIMV. Awake and following commands. Ammonia trending down (44) with  BMs, continue lactulose. Advance nutritional support with goal to increase strength towards extubation. Plt transfusions PRN. Cryo for fibrinogen <100.  06/09/2023 Venous US with R brachial and femoral DVT. Dicussed with Hematology with recommendation to start Argatroban with plts>50. Daily SBT with apnea, maintain on SIMV with backup rate of 10. Hyperammonemia resolved.   06/10/2023 patient is more alert and awake, tolerated SBT. Hematology agreed to switch to heparin for DVT given negative for HIT      Interval History:  NAEON    Review of Systems   Unable to perform ROS: Intubated     Objective:     Vitals:  Temp: 98.4 °F (36.9 °C)  Pulse: 105  Rhythm: normal sinus rhythm  BP: (!) 113/58  MAP (mmHg): 75  Resp: (!) 29  SpO2: 100 %  Oxygen Concentration (%): 40  Vent Mode: Spont  Set Rate: 10 BPM  Pressure Support: 6 cmH20  PEEP/CPAP: 5 cmH20  Peak Airway Pressure: 13 cmH20  Mean Airway Pressure: 6.6 cmH20  Plateau Pressure: 0 cmH20    Temp  Min: 97.1 °F (36.2 °C)  Max: 98.4 °F (36.9 °C)  Pulse  Min: 78  Max: 107  BP  Min: 105/55  Max: 153/72  MAP (mmHg)  Min: 75  Max: 103  Resp  Min: 10  Max: 29  SpO2  Min: 96 %  Max: 100 %  Oxygen Concentration (%)  Min: 40  Max: 40    06/09 0701 - 06/10 0700  In: 3465.7 [I.V.:42.9]  Out: 3425 [Urine:1025]   Unmeasured Output  Urine Occurrence: 0  Stool Occurrence: 1  Emesis Occurrence: 0  Pad Count: 2        Physical Exam  Vitals and nursing note reviewed.   Constitutional:       General: She is not in acute distress.     Appearance: She is ill-appearing.      Comments: Opens eye spontaneously, following commands   HENT:      Head: Normocephalic and atraumatic.   Eyes:      General: Scleral icterus present.      Extraocular Movements: Extraocular movements intact.      Pupils: Pupils are equal, round, and reactive to light.   Cardiovascular:      Rate and Rhythm: Normal rate.   Pulmonary:      Comments:   Intubated and mechanically ventilated   Abdominal:      Palpations:  Abdomen is soft.   Musculoskeletal:      Cervical back: Neck supple.   Skin:     General: Skin is warm.   Neurological:      Mental Status: She is alert.      Comments:   Alert  Intubated, unable to test orientation  Follows commands x4  Pupils equal, reactive  Face grossly symmetric   Moves all extremities antigravity and to commands    Medications:  Continuousargatroban in 0.9 % sod chlor, Last Rate: 0.5 mcg/kg/min (06/10/23 0600)    ScheduledARIPiprazole, 5 mg, Daily  folic acid, 1 mg, Daily  levETIRAcetam, 500 mg, BID  pantoprazole, 40 mg, BID  rifAXIMin, 550 mg, BID  thiamine, 100 mg, Daily    PRNaluminum-magnesium hydroxide-simethicone, 30 mL, QID PRN  dextrose 10%, 12.5 g, PRN  dextrose 10%, 25 g, PRN  dextrose, 15 g, PRN  dextrose, 30 g, PRN  fentaNYL, 50 mcg, Q1H PRN  glucagon (human recombinant), 1 mg, PRN  insulin aspart U-100, 1-10 Units, Q6H PRN  magnesium oxide, 800 mg, PRN  magnesium oxide, 800 mg, PRN  melatonin, 6 mg, Nightly PRN  naloxone, 0.02 mg, PRN  ondansetron, 4 mg, Q8H PRN  potassium bicarbonate, 35 mEq, PRN  potassium bicarbonate, 50 mEq, PRN  potassium bicarbonate, 60 mEq, PRN  potassium, sodium phosphates, 2 packet, PRN  potassium, sodium phosphates, 2 packet, PRN  potassium, sodium phosphates, 2 packet, PRN  simethicone, 1 tablet, QID PRN  sodium chloride 0.9%, 10 mL, Q8H PRN      Today I personally reviewed pertinent medications, imaging, laboratory results, notably: no evidence of HIT    Diet  Diet NPO  Diet NPO          Assessment/Plan:     Neuro  * Non-convulsive status epilepticus  58F with EtOH induced hepatic cirrhosis, seizure disorder on outpatient LEV and ZNS and bipolar disorder admitted on 5/28 for persistent encephalopathy.    - UTI on admit (Proteus mirabilis), now s/p CTX course  - Hypercalcemia 2/2 lithium toxicity (Lithium changed to Abilify per Psych)  - Hepatic encephalopathy 2/2 to medication non compliance, treated with lactulose and rifaximin     MRI Brain WO was  unremarkable for acute neurologic change  NH4 48.  EEG w/ frequent left hemispheric electrographic seizures lasting on average 10-30 seconds with prolonged seizures over 1.5 hours also noted.   Repeat EEGs without seizure activity x 24 hours, now resolved    - Continued admission to [facility] for airway watch   - Q1h neurochecks while in ICU  - Q1h vitals while in ICU  - HOB@30  - Keppra 500mg BID  - Thiamine replacement   - Advance TF as tolerated   - Lactulose, titrate to BM  - MAP>65  - Daily CMP, Mag, Phos - replete electrolytes PRN  - Lipid panel, A1c, Coags reviewed  - Daily CBC, transfuse PRN  - Start SubQ Heparin in when clinically indicated, currently held due thrombocytopenia   - PT/OT/SLP as appropriate  - CM/SW consult for dispo planning    Acute encephalopathy  Multifactorial   See Non-convulsive status epilepticus    Breakthrough seizure  See Non-convulsive status epilepticus    Psychiatric  Bipolar 1 disorder  See Non-convulsive status epilepticus    Alcohol abuse, in remission  See Non-convulsive status epilepticus    Pulmonary  Acute hypoxemic respiratory failure  Intubated today for airway protection  Difficult intubation due to far anterior airway    Cardiac/Vascular  Hypotension (arterial)  Unclear etiology  First suggestion was decreased intravascular volume plus splanchnic dilataion  Responded partially to aggressive crystalloid and colloid hydration  S/p stress dose steroids   No clear signs to suggest sepsis at present  Required pressor support, d/c'd 6/6    Renal/  Hypernatremia  Daily CMP  Improved with free water administration, RESOLVED     Hematology  Acute deep vein thrombosis (DVT) of brachial vein of right upper extremity  Noted on 6/8  Argatroban per Heme recs - transition to heparin 6/10    DVT of deep femoral vein, right  Noted on 6/8  Argatroban per Heme recs - transition to heparin    Thrombocytopenia  Likely secondary to hepatic cirrhosis and development of splenomegaly  Has  worsened and associated with hgb drop, transfuse to >50K  No signs of bleeding in GI tract or on CT, hemolysis on labs  Hematology consulted, tested negative for HIT    Oncology  Anemia  Chronic  Hemolysis work up pending, Hematology consulted  Trend CBC    Macrocytic anemia  Daily CBC    Endocrine  Hyperammonemia  Stable on current medical regimen   See Non-convulsive status epilepticus      GI  Acute liver failure without hepatic coma  GI/Hepatology following    Hepatic cirrhosis  Continue Lactulose and Rifaximin   Ammonia elevated but downtrending with bowel reg, advanced TF   Follow coags and fibrinogen    SBP prophylaxis with ceftriaxone x7 days total    Hepatic encephalopathy  Continue lactulose   See Non-convulsive status epilepticus          The patient is being Prophylaxed for:  Venous Thromboembolism with: Chemical  Stress Ulcer with: PPI  Ventilator Pneumonia with: chlorhexidine oral care    Activity Orders          Diet NPO: NPO starting at 06/01 1055    Turn patient starting at 05/28 0825    Progressive Mobility Protocol (mobilize patient to their highest level of functioning at least twice daily) starting at 05/28 2000        Full Code    Kevin Rollins MD  Neurocritical Care  [facility]

## 2023-06-10 NOTE — SUBJECTIVE & OBJECTIVE
Interval History:  NAEON    Review of Systems   Unable to perform ROS: Intubated     Objective:     Vitals:  Temp: 98.4 °F (36.9 °C)  Pulse: 105  Rhythm: normal sinus rhythm  BP: (!) 113/58  MAP (mmHg): 75  Resp: (!) 29  SpO2: 100 %  Oxygen Concentration (%): 40  Vent Mode: Spont  Set Rate: 10 BPM  Pressure Support: 6 cmH20  PEEP/CPAP: 5 cmH20  Peak Airway Pressure: 13 cmH20  Mean Airway Pressure: 6.6 cmH20  Plateau Pressure: 0 cmH20    Temp  Min: 97.1 °F (36.2 °C)  Max: 98.4 °F (36.9 °C)  Pulse  Min: 78  Max: 107  BP  Min: 105/55  Max: 153/72  MAP (mmHg)  Min: 75  Max: 103  Resp  Min: 10  Max: 29  SpO2  Min: 96 %  Max: 100 %  Oxygen Concentration (%)  Min: 40  Max: 40    06/09 0701 - 06/10 0700  In: 3465.7 [I.V.:42.9]  Out: 3425 [Urine:1025]   Unmeasured Output  Urine Occurrence: 0  Stool Occurrence: 1  Emesis Occurrence: 0  Pad Count: 2        Physical Exam  Vitals and nursing note reviewed.   Constitutional:       General: She is not in acute distress.     Appearance: She is ill-appearing.      Comments: Opens eye spontaneously, following commands   HENT:      Head: Normocephalic and atraumatic.   Eyes:      General: Scleral icterus present.      Extraocular Movements: Extraocular movements intact.      Pupils: Pupils are equal, round, and reactive to light.   Cardiovascular:      Rate and Rhythm: Normal rate.   Pulmonary:      Comments:   Intubated and mechanically ventilated   Abdominal:      Palpations: Abdomen is soft.   Musculoskeletal:      Cervical back: Neck supple.   Skin:     General: Skin is warm.   Neurological:      Mental Status: She is alert.      Comments:   Alert  Intubated, unable to test orientation  Follows commands x4  Pupils equal, reactive  Face grossly symmetric   Moves all extremities antigravity and to commands    Medications:  Continuousargatroban in 0.9 % sod chlor, Last Rate: 0.5 mcg/kg/min (06/10/23 0600)    ScheduledARIPiprazole, 5 mg, Daily  folic acid, 1 mg, Daily  levETIRAcetam,  500 mg, BID  pantoprazole, 40 mg, BID  rifAXIMin, 550 mg, BID  thiamine, 100 mg, Daily    PRNaluminum-magnesium hydroxide-simethicone, 30 mL, QID PRN  dextrose 10%, 12.5 g, PRN  dextrose 10%, 25 g, PRN  dextrose, 15 g, PRN  dextrose, 30 g, PRN  fentaNYL, 50 mcg, Q1H PRN  glucagon (human recombinant), 1 mg, PRN  insulin aspart U-100, 1-10 Units, Q6H PRN  magnesium oxide, 800 mg, PRN  magnesium oxide, 800 mg, PRN  melatonin, 6 mg, Nightly PRN  naloxone, 0.02 mg, PRN  ondansetron, 4 mg, Q8H PRN  potassium bicarbonate, 35 mEq, PRN  potassium bicarbonate, 50 mEq, PRN  potassium bicarbonate, 60 mEq, PRN  potassium, sodium phosphates, 2 packet, PRN  potassium, sodium phosphates, 2 packet, PRN  potassium, sodium phosphates, 2 packet, PRN  simethicone, 1 tablet, QID PRN  sodium chloride 0.9%, 10 mL, Q8H PRN      Today I personally reviewed pertinent medications, imaging, laboratory results, notably: no evidence of HIT    Diet  Diet NPO  Diet NPO

## 2023-06-10 NOTE — PLAN OF CARE
Pt remains free from falls and injuries. ETT/vent, NGT, flexi, PIVs, midline remain. Argatroban gtt infusing (therapeutic). Pt alert, calm, appropriate, SINGH, following commands. VSS. Fentanyl X1 for c/o generalized discomfort/pain. K, mg, phos replaced. TF at goal. BG checked. VSS, no acute issues overnight. Plan of care reviewed with pt; no family at bedside.

## 2023-06-11 ENCOUNTER — ANESTHESIA (OUTPATIENT)
Dept: ENDOSCOPY | Facility: HOSPITAL | Age: 58
DRG: 981 | End: 2023-06-11
Payer: MEDICARE

## 2023-06-11 ENCOUNTER — ANESTHESIA EVENT (OUTPATIENT)
Dept: ENDOSCOPY | Facility: HOSPITAL | Age: 58
DRG: 981 | End: 2023-06-11
Payer: MEDICARE

## 2023-06-11 PROBLEM — R57.8 HEMORRHAGIC SHOCK: Status: ACTIVE | Noted: 2023-06-11

## 2023-06-11 PROBLEM — D68.9 COAGULOPATHY: Status: ACTIVE | Noted: 2023-06-11

## 2023-06-11 PROBLEM — R57.1 HYPOVOLEMIC SHOCK: Status: ACTIVE | Noted: 2023-06-11

## 2023-06-11 PROBLEM — R58 RETROPERITONEAL HEMORRHAGE: Status: ACTIVE | Noted: 2023-06-11

## 2023-06-11 LAB
ABO + RH BLD: NORMAL
ALBUMIN SERPL BCP-MCNC: 1.8 G/DL (ref 3.5–5.2)
ALBUMIN SERPL BCP-MCNC: 2.1 G/DL (ref 3.5–5.2)
ALBUMIN SERPL BCP-MCNC: 2.1 G/DL (ref 3.5–5.2)
ALBUMIN SERPL BCP-MCNC: 2.4 G/DL (ref 3.5–5.2)
ALLENS TEST: ABNORMAL
ALLENS TEST: ABNORMAL
ALP SERPL-CCNC: 108 U/L (ref 55–135)
ALP SERPL-CCNC: 151 U/L (ref 55–135)
ALP SERPL-CCNC: 154 U/L (ref 55–135)
ALP SERPL-CCNC: 93 U/L (ref 55–135)
ALT SERPL W/O P-5'-P-CCNC: 20 U/L (ref 10–44)
ALT SERPL W/O P-5'-P-CCNC: 23 U/L (ref 10–44)
ALT SERPL W/O P-5'-P-CCNC: 25 U/L (ref 10–44)
ALT SERPL W/O P-5'-P-CCNC: 29 U/L (ref 10–44)
AMMONIA PLAS-SCNC: 37 UMOL/L (ref 10–50)
ANION GAP SERPL CALC-SCNC: 12 MMOL/L (ref 8–16)
ANION GAP SERPL CALC-SCNC: 12 MMOL/L (ref 8–16)
ANION GAP SERPL CALC-SCNC: 6 MMOL/L (ref 8–16)
ANISOCYTOSIS BLD QL SMEAR: SLIGHT
APTT PPP: 33.3 SEC (ref 21–32)
APTT PPP: >150 SEC (ref 21–32)
AST SERPL-CCNC: 32 U/L (ref 10–40)
AST SERPL-CCNC: 34 U/L (ref 10–40)
AST SERPL-CCNC: 37 U/L (ref 10–40)
AST SERPL-CCNC: 50 U/L (ref 10–40)
BASOPHILS # BLD AUTO: 0.01 K/UL (ref 0–0.2)
BASOPHILS # BLD AUTO: 0.02 K/UL (ref 0–0.2)
BASOPHILS # BLD AUTO: 0.03 K/UL (ref 0–0.2)
BASOPHILS NFR BLD: 0.1 % (ref 0–1.9)
BASOPHILS NFR BLD: 0.2 % (ref 0–1.9)
BASOPHILS NFR BLD: 0.4 % (ref 0–1.9)
BILIRUB DIRECT SERPL-MCNC: 1.1 MG/DL (ref 0.1–0.3)
BILIRUB SERPL-MCNC: 3.1 MG/DL (ref 0.1–1)
BILIRUB SERPL-MCNC: 3.3 MG/DL (ref 0.1–1)
BILIRUB SERPL-MCNC: 3.3 MG/DL (ref 0.1–1)
BILIRUB SERPL-MCNC: 3.8 MG/DL (ref 0.1–1)
BLD GP AB SCN CELLS X3 SERPL QL: NORMAL
BLD PROD TYP BPU: NORMAL
BLOOD UNIT EXPIRATION DATE: NORMAL
BLOOD UNIT TYPE CODE: 5100
BLOOD UNIT TYPE CODE: 6200
BLOOD UNIT TYPE CODE: 7300
BLOOD UNIT TYPE: NORMAL
BUN SERPL-MCNC: 20 MG/DL (ref 6–20)
BUN SERPL-MCNC: 22 MG/DL (ref 6–20)
BUN SERPL-MCNC: 23 MG/DL (ref 6–20)
CALCIUM SERPL-MCNC: 7.5 MG/DL (ref 8.7–10.5)
CALCIUM SERPL-MCNC: 7.9 MG/DL (ref 8.7–10.5)
CALCIUM SERPL-MCNC: 9 MG/DL (ref 8.7–10.5)
CHLORIDE SERPL-SCNC: 114 MMOL/L (ref 95–110)
CHLORIDE SERPL-SCNC: 114 MMOL/L (ref 95–110)
CHLORIDE SERPL-SCNC: 116 MMOL/L (ref 95–110)
CO2 SERPL-SCNC: 18 MMOL/L (ref 23–29)
CO2 SERPL-SCNC: 19 MMOL/L (ref 23–29)
CO2 SERPL-SCNC: 25 MMOL/L (ref 23–29)
CODING SYSTEM: NORMAL
CREAT SERPL-MCNC: 0.4 MG/DL (ref 0.5–1.4)
CREAT SERPL-MCNC: 0.6 MG/DL (ref 0.5–1.4)
CREAT SERPL-MCNC: 0.6 MG/DL (ref 0.5–1.4)
CROSSMATCH INTERPRETATION: NORMAL
DELSYS: ABNORMAL
DELSYS: ABNORMAL
DIFFERENTIAL METHOD: ABNORMAL
DISPENSE STATUS: NORMAL
EOSINOPHIL # BLD AUTO: 0 K/UL (ref 0–0.5)
EOSINOPHIL # BLD AUTO: 0.1 K/UL (ref 0–0.5)
EOSINOPHIL # BLD AUTO: 0.3 K/UL (ref 0–0.5)
EOSINOPHIL NFR BLD: 0.3 % (ref 0–8)
EOSINOPHIL NFR BLD: 0.3 % (ref 0–8)
EOSINOPHIL NFR BLD: 3.5 % (ref 0–8)
ERYTHROCYTE [DISTWIDTH] IN BLOOD BY AUTOMATED COUNT: 16.8 % (ref 11.5–14.5)
ERYTHROCYTE [DISTWIDTH] IN BLOOD BY AUTOMATED COUNT: 25.6 % (ref 11.5–14.5)
ERYTHROCYTE [DISTWIDTH] IN BLOOD BY AUTOMATED COUNT: 25.8 % (ref 11.5–14.5)
ERYTHROCYTE [SEDIMENTATION RATE] IN BLOOD BY WESTERGREN METHOD: 12 MM/H
ERYTHROCYTE [SEDIMENTATION RATE] IN BLOOD BY WESTERGREN METHOD: 16 MM/H
EST. GFR  (NO RACE VARIABLE): >60 ML/MIN/1.73 M^2
FACT X PPP CHRO-ACNC: 0.5 IU/ML (ref 0.3–0.7)
FACT X PPP CHRO-ACNC: 0.56 IU/ML (ref 0.3–0.7)
FIBRINOGEN PPP-MCNC: 120 MG/DL (ref 182–400)
FIBRINOGEN PPP-MCNC: 123 MG/DL (ref 182–400)
FIO2: 40
FIO2: 50
GLUCOSE SERPL-MCNC: 134 MG/DL (ref 70–110)
GLUCOSE SERPL-MCNC: 143 MG/DL (ref 70–110)
GLUCOSE SERPL-MCNC: 91 MG/DL (ref 70–110)
HCO3 UR-SCNC: 18.9 MMOL/L (ref 24–28)
HCO3 UR-SCNC: 24.9 MMOL/L (ref 24–28)
HCT VFR BLD AUTO: 14.8 % (ref 37–48.5)
HCT VFR BLD AUTO: 23.3 % (ref 37–48.5)
HCT VFR BLD AUTO: 24.4 % (ref 37–48.5)
HCT VFR BLD CALC: <15 %PCV (ref 36–54)
HGB BLD-MCNC: 4.4 G/DL (ref 12–16)
HGB BLD-MCNC: 7.4 G/DL (ref 12–16)
HGB BLD-MCNC: 7.6 G/DL (ref 12–16)
HYPOCHROMIA BLD QL SMEAR: ABNORMAL
IMM GRANULOCYTES # BLD AUTO: 0.04 K/UL (ref 0–0.04)
IMM GRANULOCYTES # BLD AUTO: 0.08 K/UL (ref 0–0.04)
IMM GRANULOCYTES # BLD AUTO: 0.19 K/UL (ref 0–0.04)
IMM GRANULOCYTES NFR BLD AUTO: 0.5 % (ref 0–0.5)
IMM GRANULOCYTES NFR BLD AUTO: 0.8 % (ref 0–0.5)
IMM GRANULOCYTES NFR BLD AUTO: 1.3 % (ref 0–0.5)
INR PPP: 1.5 (ref 0.8–1.2)
INR PPP: 2.5 (ref 0.8–1.2)
IP: 12
IP: 15
IT: 0.9
IT: 1
LACTATE SERPL-SCNC: 6.2 MMOL/L (ref 0.5–2.2)
LYMPHOCYTES # BLD AUTO: 0.9 K/UL (ref 1–4.8)
LYMPHOCYTES # BLD AUTO: 1.6 K/UL (ref 1–4.8)
LYMPHOCYTES # BLD AUTO: 1.9 K/UL (ref 1–4.8)
LYMPHOCYTES NFR BLD: 10.7 % (ref 18–48)
LYMPHOCYTES NFR BLD: 24.6 % (ref 18–48)
LYMPHOCYTES NFR BLD: 9.4 % (ref 18–48)
MAGNESIUM SERPL-MCNC: 1.7 MG/DL (ref 1.6–2.6)
MAGNESIUM SERPL-MCNC: 1.8 MG/DL (ref 1.6–2.6)
MAP: 9.3
MCH RBC QN AUTO: 30.9 PG (ref 27–31)
MCH RBC QN AUTO: 31.2 PG (ref 27–31)
MCH RBC QN AUTO: 31.6 PG (ref 27–31)
MCHC RBC AUTO-ENTMCNC: 29.7 G/DL (ref 32–36)
MCHC RBC AUTO-ENTMCNC: 30.3 G/DL (ref 32–36)
MCHC RBC AUTO-ENTMCNC: 32.6 G/DL (ref 32–36)
MCV RBC AUTO: 104 FL (ref 82–98)
MCV RBC AUTO: 105 FL (ref 82–98)
MCV RBC AUTO: 95 FL (ref 82–98)
MODE: ABNORMAL
MODE: ABNORMAL
MONOCYTES # BLD AUTO: 0.7 K/UL (ref 0.3–1)
MONOCYTES # BLD AUTO: 0.8 K/UL (ref 0.3–1)
MONOCYTES # BLD AUTO: 1.6 K/UL (ref 0.3–1)
MONOCYTES NFR BLD: 11 % (ref 4–15)
MONOCYTES NFR BLD: 8.2 % (ref 4–15)
MONOCYTES NFR BLD: 9.2 % (ref 4–15)
NEUTROPHILS # BLD AUTO: 11.1 K/UL (ref 1.8–7.7)
NEUTROPHILS # BLD AUTO: 4.7 K/UL (ref 1.8–7.7)
NEUTROPHILS # BLD AUTO: 8.1 K/UL (ref 1.8–7.7)
NEUTROPHILS NFR BLD: 61.8 % (ref 38–73)
NEUTROPHILS NFR BLD: 76.6 % (ref 38–73)
NEUTROPHILS NFR BLD: 81.1 % (ref 38–73)
NRBC BLD-RTO: 0 /100 WBC
NUM UNITS TRANS FFP: NORMAL
PCO2 BLDA: 23.6 MMHG (ref 35–45)
PCO2 BLDA: 30.6 MMHG (ref 35–45)
PEEP: 5
PEEP: 5
PH SMN: 7.51 [PH] (ref 7.35–7.45)
PH SMN: 7.52 [PH] (ref 7.35–7.45)
PHOSPHATE SERPL-MCNC: 3.7 MG/DL (ref 2.7–4.5)
PIP: 17
PLATELET # BLD AUTO: 111 K/UL (ref 150–450)
PLATELET # BLD AUTO: 172 K/UL (ref 150–450)
PLATELET # BLD AUTO: 60 K/UL (ref 150–450)
PLATELET BLD QL SMEAR: ABNORMAL
PLATELET BLD QL SMEAR: ABNORMAL
PMV BLD AUTO: 10.8 FL (ref 9.2–12.9)
PMV BLD AUTO: 9.8 FL (ref 9.2–12.9)
PMV BLD AUTO: 9.9 FL (ref 9.2–12.9)
PO2 BLDA: 59 MMHG (ref 80–100)
PO2 BLDA: 70 MMHG (ref 80–100)
POC BE: -4 MMOL/L
POC BE: 2 MMOL/L
POC IONIZED CALCIUM: 1.03 MMOL/L (ref 1.06–1.42)
POC SATURATED O2: 93 % (ref 95–100)
POC SATURATED O2: 96 % (ref 95–100)
POC TCO2: 20 MMOL/L (ref 23–27)
POC TCO2: 26 MMOL/L (ref 23–27)
POCT GLUCOSE: 112 MG/DL (ref 70–110)
POCT GLUCOSE: 118 MG/DL (ref 70–110)
POIKILOCYTOSIS BLD QL SMEAR: SLIGHT
POLYCHROMASIA BLD QL SMEAR: ABNORMAL
POTASSIUM BLD-SCNC: 3.7 MMOL/L (ref 3.5–5.1)
POTASSIUM SERPL-SCNC: 3.6 MMOL/L (ref 3.5–5.1)
POTASSIUM SERPL-SCNC: 3.7 MMOL/L (ref 3.5–5.1)
POTASSIUM SERPL-SCNC: 3.8 MMOL/L (ref 3.5–5.1)
PROT SERPL-MCNC: 3.5 G/DL (ref 6–8.4)
PROT SERPL-MCNC: 4.2 G/DL (ref 6–8.4)
PROT SERPL-MCNC: 4.2 G/DL (ref 6–8.4)
PROT SERPL-MCNC: 4.3 G/DL (ref 6–8.4)
PROTHROMBIN TIME: 15.6 SEC (ref 9–12.5)
PROTHROMBIN TIME: 25.5 SEC (ref 9–12.5)
RBC # BLD AUTO: 1.41 M/UL (ref 4–5.4)
RBC # BLD AUTO: 2.34 M/UL (ref 4–5.4)
RBC # BLD AUTO: 2.46 M/UL (ref 4–5.4)
SAMPLE: ABNORMAL
SAMPLE: ABNORMAL
SITE: ABNORMAL
SITE: ABNORMAL
SODIUM BLD-SCNC: 145 MMOL/L (ref 136–145)
SODIUM SERPL-SCNC: 144 MMOL/L (ref 136–145)
SODIUM SERPL-SCNC: 145 MMOL/L (ref 136–145)
SODIUM SERPL-SCNC: 147 MMOL/L (ref 136–145)
SP02: 93
SP02: 98
SPECIMEN OUTDATE: NORMAL
TRANS ERYTHROCYTES VOL PATIENT: NORMAL ML
UNIT NUMBER: NORMAL
VT: 385
WBC # BLD AUTO: 14.47 K/UL (ref 3.9–12.7)
WBC # BLD AUTO: 7.61 K/UL (ref 3.9–12.7)
WBC # BLD AUTO: 9.97 K/UL (ref 3.9–12.7)

## 2023-06-11 PROCEDURE — D9220A PRA ANESTHESIA: Mod: CRNA,,, | Performed by: NURSE ANESTHETIST, CERTIFIED REGISTERED

## 2023-06-11 PROCEDURE — 84295 ASSAY OF SERUM SODIUM: CPT

## 2023-06-11 PROCEDURE — P9035 PLATELET PHERES LEUKOREDUCED: HCPCS | Performed by: STUDENT IN AN ORGANIZED HEALTH CARE EDUCATION/TRAINING PROGRAM

## 2023-06-11 PROCEDURE — 36620 INSERTION CATHETER ARTERY: CPT | Mod: GC,,, | Performed by: PSYCHIATRY & NEUROLOGY

## 2023-06-11 PROCEDURE — 94761 N-INVAS EAR/PLS OXIMETRY MLT: CPT

## 2023-06-11 PROCEDURE — 99291 PR CRITICAL CARE, E/M 30-74 MINUTES: ICD-10-PCS | Mod: 25,,, | Performed by: PSYCHIATRY & NEUROLOGY

## 2023-06-11 PROCEDURE — 85520 HEPARIN ASSAY: CPT | Mod: 91 | Performed by: PSYCHIATRY & NEUROLOGY

## 2023-06-11 PROCEDURE — 36620 ARTERIAL LINE: ICD-10-PCS | Mod: ,,, | Performed by: STUDENT IN AN ORGANIZED HEALTH CARE EDUCATION/TRAINING PROGRAM

## 2023-06-11 PROCEDURE — 25000003 PHARM REV CODE 250: Performed by: STUDENT IN AN ORGANIZED HEALTH CARE EDUCATION/TRAINING PROGRAM

## 2023-06-11 PROCEDURE — P9035 PLATELET PHERES LEUKOREDUCED: HCPCS | Performed by: NURSE PRACTITIONER

## 2023-06-11 PROCEDURE — 85520 HEPARIN ASSAY: CPT | Performed by: EMERGENCY MEDICINE

## 2023-06-11 PROCEDURE — P9021 RED BLOOD CELLS UNIT: HCPCS | Performed by: ANESTHESIOLOGY

## 2023-06-11 PROCEDURE — 63600175 PHARM REV CODE 636 W HCPCS: Performed by: STUDENT IN AN ORGANIZED HEALTH CARE EDUCATION/TRAINING PROGRAM

## 2023-06-11 PROCEDURE — P9017 PLASMA 1 DONOR FRZ W/IN 8 HR: HCPCS | Performed by: STUDENT IN AN ORGANIZED HEALTH CARE EDUCATION/TRAINING PROGRAM

## 2023-06-11 PROCEDURE — 99291 CRITICAL CARE FIRST HOUR: CPT | Mod: 25,,, | Performed by: PSYCHIATRY & NEUROLOGY

## 2023-06-11 PROCEDURE — D9220A PRA ANESTHESIA: ICD-10-PCS | Mod: ANES,,, | Performed by: ANESTHESIOLOGY

## 2023-06-11 PROCEDURE — 36620 PR INSERT CATH,ART,PERCUT,SHORTTERM: ICD-10-PCS | Mod: GC,,, | Performed by: PSYCHIATRY & NEUROLOGY

## 2023-06-11 PROCEDURE — 82800 BLOOD PH: CPT

## 2023-06-11 PROCEDURE — 36620 INSERTION CATHETER ARTERY: CPT | Mod: ,,, | Performed by: STUDENT IN AN ORGANIZED HEALTH CARE EDUCATION/TRAINING PROGRAM

## 2023-06-11 PROCEDURE — 84132 ASSAY OF SERUM POTASSIUM: CPT

## 2023-06-11 PROCEDURE — 63600175 PHARM REV CODE 636 W HCPCS: Performed by: PSYCHIATRY & NEUROLOGY

## 2023-06-11 PROCEDURE — 25000003 PHARM REV CODE 250: Performed by: NURSE ANESTHETIST, CERTIFIED REGISTERED

## 2023-06-11 PROCEDURE — 82140 ASSAY OF AMMONIA: CPT | Performed by: NURSE PRACTITIONER

## 2023-06-11 PROCEDURE — 85384 FIBRINOGEN ACTIVITY: CPT | Mod: 91 | Performed by: STUDENT IN AN ORGANIZED HEALTH CARE EDUCATION/TRAINING PROGRAM

## 2023-06-11 PROCEDURE — 25500020 PHARM REV CODE 255: Performed by: PSYCHIATRY & NEUROLOGY

## 2023-06-11 PROCEDURE — 85002 BLEEDING TIME TEST: CPT

## 2023-06-11 PROCEDURE — 85730 THROMBOPLASTIN TIME PARTIAL: CPT | Performed by: PSYCHIATRY & NEUROLOGY

## 2023-06-11 PROCEDURE — 80053 COMPREHEN METABOLIC PANEL: CPT | Mod: 91 | Performed by: PSYCHIATRY & NEUROLOGY

## 2023-06-11 PROCEDURE — 25000003 PHARM REV CODE 250

## 2023-06-11 PROCEDURE — 85730 THROMBOPLASTIN TIME PARTIAL: CPT | Mod: 91 | Performed by: NURSE PRACTITIONER

## 2023-06-11 PROCEDURE — 84100 ASSAY OF PHOSPHORUS: CPT | Performed by: PSYCHIATRY & NEUROLOGY

## 2023-06-11 PROCEDURE — D9220A PRA ANESTHESIA: Mod: ANES,,, | Performed by: ANESTHESIOLOGY

## 2023-06-11 PROCEDURE — 82330 ASSAY OF CALCIUM: CPT

## 2023-06-11 PROCEDURE — 80053 COMPREHEN METABOLIC PANEL: CPT | Performed by: PHYSICIAN ASSISTANT

## 2023-06-11 PROCEDURE — 99900035 HC TECH TIME PER 15 MIN (STAT)

## 2023-06-11 PROCEDURE — 85025 COMPLETE CBC W/AUTO DIFF WBC: CPT | Mod: 91 | Performed by: NURSE PRACTITIONER

## 2023-06-11 PROCEDURE — 63600175 PHARM REV CODE 636 W HCPCS: Performed by: NURSE ANESTHETIST, CERTIFIED REGISTERED

## 2023-06-11 PROCEDURE — 20000000 HC ICU ROOM

## 2023-06-11 PROCEDURE — 51701 INSERT BLADDER CATHETER: CPT

## 2023-06-11 PROCEDURE — 99292 CRITICAL CARE ADDL 30 MIN: CPT | Mod: 25,,, | Performed by: PSYCHIATRY & NEUROLOGY

## 2023-06-11 PROCEDURE — D9220A PRA ANESTHESIA: ICD-10-PCS | Mod: CRNA,,, | Performed by: NURSE ANESTHETIST, CERTIFIED REGISTERED

## 2023-06-11 PROCEDURE — 83605 ASSAY OF LACTIC ACID: CPT | Performed by: PSYCHIATRY & NEUROLOGY

## 2023-06-11 PROCEDURE — C9113 INJ PANTOPRAZOLE SODIUM, VIA: HCPCS | Performed by: STUDENT IN AN ORGANIZED HEALTH CARE EDUCATION/TRAINING PROGRAM

## 2023-06-11 PROCEDURE — 27000221 HC OXYGEN, UP TO 24 HOURS

## 2023-06-11 PROCEDURE — 85384 FIBRINOGEN ACTIVITY: CPT | Performed by: PSYCHIATRY & NEUROLOGY

## 2023-06-11 PROCEDURE — 51798 US URINE CAPACITY MEASURE: CPT

## 2023-06-11 PROCEDURE — 85610 PROTHROMBIN TIME: CPT | Performed by: PSYCHIATRY & NEUROLOGY

## 2023-06-11 PROCEDURE — P9021 RED BLOOD CELLS UNIT: HCPCS | Performed by: STUDENT IN AN ORGANIZED HEALTH CARE EDUCATION/TRAINING PROGRAM

## 2023-06-11 PROCEDURE — 25000003 PHARM REV CODE 250: Performed by: PSYCHIATRY & NEUROLOGY

## 2023-06-11 PROCEDURE — 99292 PR CRITICAL CARE, ADDL 30 MIN: ICD-10-PCS | Mod: 25,,, | Performed by: PSYCHIATRY & NEUROLOGY

## 2023-06-11 PROCEDURE — 85610 PROTHROMBIN TIME: CPT | Mod: 91 | Performed by: NURSE PRACTITIONER

## 2023-06-11 PROCEDURE — 37000008 HC ANESTHESIA 1ST 15 MINUTES

## 2023-06-11 PROCEDURE — 83735 ASSAY OF MAGNESIUM: CPT | Performed by: PSYCHIATRY & NEUROLOGY

## 2023-06-11 PROCEDURE — 94003 VENT MGMT INPAT SUBQ DAY: CPT

## 2023-06-11 PROCEDURE — 36556 INSERT NON-TUNNEL CV CATH: CPT | Mod: ,,, | Performed by: PSYCHIATRY & NEUROLOGY

## 2023-06-11 PROCEDURE — 37799 UNLISTED PX VASCULAR SURGERY: CPT

## 2023-06-11 PROCEDURE — 85014 HEMATOCRIT: CPT

## 2023-06-11 PROCEDURE — 99900026 HC AIRWAY MAINTENANCE (STAT)

## 2023-06-11 PROCEDURE — 85025 COMPLETE CBC W/AUTO DIFF WBC: CPT | Performed by: PSYCHIATRY & NEUROLOGY

## 2023-06-11 PROCEDURE — 37000009 HC ANESTHESIA EA ADD 15 MINS

## 2023-06-11 PROCEDURE — 36556 PR INSERT NON-TUNNEL CV CATH 5+ YRS OLD: ICD-10-PCS | Mod: ,,, | Performed by: PSYCHIATRY & NEUROLOGY

## 2023-06-11 PROCEDURE — 82803 BLOOD GASES ANY COMBINATION: CPT

## 2023-06-11 RX ORDER — MIDAZOLAM HYDROCHLORIDE 1 MG/ML
INJECTION, SOLUTION INTRAMUSCULAR; INTRAVENOUS
Status: DISCONTINUED | OUTPATIENT
Start: 2023-06-11 | End: 2023-06-11

## 2023-06-11 RX ORDER — MIDODRINE HYDROCHLORIDE 5 MG/1
5 TABLET ORAL EVERY 8 HOURS
Status: DISCONTINUED | OUTPATIENT
Start: 2023-06-11 | End: 2023-06-11

## 2023-06-11 RX ORDER — OCTREOTIDE ACETATE 50 UG/ML
50 INJECTION, SOLUTION INTRAVENOUS; SUBCUTANEOUS ONCE
Status: COMPLETED | OUTPATIENT
Start: 2023-06-11 | End: 2023-06-11

## 2023-06-11 RX ORDER — HYDROCODONE BITARTRATE AND ACETAMINOPHEN 500; 5 MG/1; MG/1
TABLET ORAL
Status: DISCONTINUED | OUTPATIENT
Start: 2023-06-11 | End: 2023-06-12

## 2023-06-11 RX ORDER — CALCIUM CHLORIDE INJECTION 100 MG/ML
INJECTION, SOLUTION INTRAVENOUS
Status: DISCONTINUED | OUTPATIENT
Start: 2023-06-11 | End: 2023-06-11

## 2023-06-11 RX ORDER — FENTANYL CITRATE 50 UG/ML
INJECTION, SOLUTION INTRAMUSCULAR; INTRAVENOUS
Status: DISCONTINUED | OUTPATIENT
Start: 2023-06-11 | End: 2023-06-11

## 2023-06-11 RX ORDER — ROCURONIUM BROMIDE 10 MG/ML
INJECTION, SOLUTION INTRAVENOUS
Status: DISCONTINUED | OUTPATIENT
Start: 2023-06-11 | End: 2023-06-11

## 2023-06-11 RX ORDER — PHENYLEPHRINE HCL IN 0.9% NACL 1 MG/10 ML
500 SYRINGE (ML) INTRAVENOUS ONCE
Status: COMPLETED | OUTPATIENT
Start: 2023-06-11 | End: 2023-06-11

## 2023-06-11 RX ORDER — LIDOCAINE HYDROCHLORIDE 10 MG/ML
INJECTION INFILTRATION; PERINEURAL
Status: COMPLETED | OUTPATIENT
Start: 2023-06-11 | End: 2023-06-11

## 2023-06-11 RX ORDER — LIDOCAINE HYDROCHLORIDE 10 MG/ML
5 INJECTION INFILTRATION; PERINEURAL ONCE
Status: COMPLETED | OUTPATIENT
Start: 2023-06-11 | End: 2023-06-11

## 2023-06-11 RX ORDER — LEVETIRACETAM 100 MG/ML
500 SOLUTION ORAL 2 TIMES DAILY
Status: DISCONTINUED | OUTPATIENT
Start: 2023-06-11 | End: 2023-06-18

## 2023-06-11 RX ORDER — LABETALOL HCL 20 MG/4 ML
10 SYRINGE (ML) INTRAVENOUS EVERY 4 HOURS PRN
Status: DISCONTINUED | OUTPATIENT
Start: 2023-06-11 | End: 2023-06-20

## 2023-06-11 RX ORDER — NOREPINEPHRINE BITARTRATE/D5W 4MG/250ML
0-3 PLASTIC BAG, INJECTION (ML) INTRAVENOUS CONTINUOUS
Status: DISCONTINUED | OUTPATIENT
Start: 2023-06-11 | End: 2023-06-11

## 2023-06-11 RX ORDER — PHENYLEPHRINE HCL IN 0.9% NACL 1 MG/10 ML
SYRINGE (ML) INTRAVENOUS
Status: DISPENSED
Start: 2023-06-11 | End: 2023-06-12

## 2023-06-11 RX ORDER — LABETALOL HCL 20 MG/4 ML
10 SYRINGE (ML) INTRAVENOUS EVERY 4 HOURS PRN
Status: DISCONTINUED | OUTPATIENT
Start: 2023-06-11 | End: 2023-06-11

## 2023-06-11 RX ORDER — PROTAMINE SULFATE 10 MG/ML
10 INJECTION, SOLUTION INTRAVENOUS ONCE
Status: COMPLETED | OUTPATIENT
Start: 2023-06-11 | End: 2023-06-11

## 2023-06-11 RX ORDER — FUROSEMIDE 10 MG/ML
20 INJECTION INTRAMUSCULAR; INTRAVENOUS ONCE
Status: COMPLETED | OUTPATIENT
Start: 2023-06-11 | End: 2023-06-11

## 2023-06-11 RX ADMIN — ONDANSETRON 1 G: 2 INJECTION INTRAMUSCULAR; INTRAVENOUS at 03:06

## 2023-06-11 RX ADMIN — INSULIN ASPART 2 UNITS: 100 INJECTION, SOLUTION INTRAVENOUS; SUBCUTANEOUS at 06:06

## 2023-06-11 RX ADMIN — LIDOCAINE HYDROCHLORIDE 10 ML: 10 INJECTION, SOLUTION INFILTRATION; PERINEURAL at 03:06

## 2023-06-11 RX ADMIN — Medication 100 MG: at 09:06

## 2023-06-11 RX ADMIN — OCTREOTIDE ACETATE 50 MCG/HR: 500 INJECTION, SOLUTION INTRAVENOUS; SUBCUTANEOUS at 02:06

## 2023-06-11 RX ADMIN — PANTOPRAZOLE SODIUM 40 MG: 40 INJECTION, POWDER, FOR SOLUTION INTRAVENOUS at 09:06

## 2023-06-11 RX ADMIN — ARIPIPRAZOLE 5 MG: 5 TABLET ORAL at 09:06

## 2023-06-11 RX ADMIN — PROTAMINE SULFATE 10 MG: 10 INJECTION, SOLUTION INTRAVENOUS at 01:06

## 2023-06-11 RX ADMIN — LIDOCAINE HYDROCHLORIDE 5 ML: 10 INJECTION, SOLUTION INFILTRATION; PERINEURAL at 12:06

## 2023-06-11 RX ADMIN — RIFAXIMIN 550 MG: 550 TABLET ORAL at 09:06

## 2023-06-11 RX ADMIN — FENTANYL CITRATE 50 MCG: 0.05 INJECTION, SOLUTION INTRAMUSCULAR; INTRAVENOUS at 10:06

## 2023-06-11 RX ADMIN — NOREPINEPHRINE BITARTRATE 1.2 MCG/KG/MIN: 4 INJECTION, SOLUTION INTRAVENOUS at 12:06

## 2023-06-11 RX ADMIN — FUROSEMIDE 20 MG: 10 INJECTION, SOLUTION INTRAMUSCULAR; INTRAVENOUS at 10:06

## 2023-06-11 RX ADMIN — VASOPRESSIN 0.04 UNITS/MIN: 20 INJECTION INTRAVENOUS at 12:06

## 2023-06-11 RX ADMIN — Medication 500 MCG: at 12:06

## 2023-06-11 RX ADMIN — PHYTONADIONE 10 MG: 10 INJECTION, EMULSION INTRAMUSCULAR; INTRAVENOUS; SUBCUTANEOUS at 03:06

## 2023-06-11 RX ADMIN — POTASSIUM & SODIUM PHOSPHATES POWDER PACK 280-160-250 MG 2 PACKET: 280-160-250 PACK at 12:06

## 2023-06-11 RX ADMIN — FENTANYL CITRATE 50 MCG: 0.05 INJECTION, SOLUTION INTRAMUSCULAR; INTRAVENOUS at 11:06

## 2023-06-11 RX ADMIN — OCTREOTIDE ACETATE 50 MCG: 50 INJECTION, SOLUTION INTRAVENOUS; SUBCUTANEOUS at 01:06

## 2023-06-11 RX ADMIN — HYDROCORTISONE SODIUM SUCCINATE 100 MG: 100 INJECTION, POWDER, FOR SOLUTION INTRAMUSCULAR; INTRAVENOUS at 12:06

## 2023-06-11 RX ADMIN — SODIUM CHLORIDE, SODIUM GLUCONATE, SODIUM ACETATE, POTASSIUM CHLORIDE, MAGNESIUM CHLORIDE, SODIUM PHOSPHATE, DIBASIC, AND POTASSIUM PHOSPHATE: .53; .5; .37; .037; .03; .012; .00082 INJECTION, SOLUTION INTRAVENOUS at 03:06

## 2023-06-11 RX ADMIN — LEVETIRACETAM 500 MG: 500 SOLUTION ORAL at 09:06

## 2023-06-11 RX ADMIN — Medication 800 MG: at 12:06

## 2023-06-11 RX ADMIN — FENTANYL CITRATE 50 MCG: 0.05 INJECTION, SOLUTION INTRAMUSCULAR; INTRAVENOUS at 12:06

## 2023-06-11 RX ADMIN — LABETALOL HYDROCHLORIDE 10 MG: 5 INJECTION, SOLUTION INTRAVENOUS at 05:06

## 2023-06-11 RX ADMIN — MIDAZOLAM HYDROCHLORIDE 2 MG: 1 INJECTION, SOLUTION INTRAMUSCULAR; INTRAVENOUS at 03:06

## 2023-06-11 RX ADMIN — FOLIC ACID 1 MG: 1 TABLET ORAL at 09:06

## 2023-06-11 RX ADMIN — IOHEXOL 75 ML: 350 INJECTION, SOLUTION INTRAVENOUS at 02:06

## 2023-06-11 RX ADMIN — FENTANYL CITRATE 25 MCG: 50 INJECTION, SOLUTION INTRAMUSCULAR; INTRAVENOUS at 04:06

## 2023-06-11 RX ADMIN — ROCURONIUM BROMIDE 50 MG: 10 INJECTION INTRAVENOUS at 03:06

## 2023-06-11 RX ADMIN — IOHEXOL 80 ML: 300 INJECTION, SOLUTION INTRAVENOUS at 04:06

## 2023-06-11 NOTE — ASSESSMENT & PLAN NOTE
Noted on 6/8  Argatroban per Heme recs - transition to heparin 6/10    HELD 6/11 in setting of retroperitoneal bleed

## 2023-06-11 NOTE — NURSING
Pt's BP 80's/40's.  MD Vicenta to bedside.     500 NS bolus started.   2 kulwant bumps given     No change.     2 kulwant bumps given. No change.     Vaso gtt started. SBP improving.      SBP dropped. Levo gtt started.     500 NS bolus started.     Per MD Carmela, MAPs okay at 50 as long pt is awake and able to nod to questions/commands. WCTM.     A line placed by MD Vicenat   Central line placed by MD Vicenta.     Pt taken to CT with RN x 2, NP, PharmD, and RRT. Gtts continuted and ambu bag with patient.  Pt tolerated scan. Pt transported back to [facility]. WCTM.

## 2023-06-11 NOTE — PROGRESS NOTES
1508 - Anesthesia at bedside to obtain telephone consent for IR procedure at this time.     1526 - MD ordered to transfuse 1 FFP, 1 Platelet, and 1 PRBC STAT prior to procedure. FFP and Platelet administered via gravity an PRBC initiated after administration of FFP/Platelet.    1540 - IR Anesthesia team at bedside to escort patient to procedure. Vitamin K available from pharmacy at this time and given to anesthesia for administration. Anesthesia to continue monitoring and administration of PRBCs. Chart, ambubag, and portable ventilator at bedside. All consents signed and witnessed and placed in chart.

## 2023-06-11 NOTE — ASSESSMENT & PLAN NOTE
Nutrition consulted. Most recent weight and BMI monitored-     Measurements:  Wt Readings from Last 1 Encounters:   06/03/23 59.9 kg (132 lb)   Body mass index is 24.14 kg/m².    Patient has been screened and assessed by RD.    Malnutrition Type:  Context: social/environmental circumstances  Level: severe    Malnutrition Characteristic Summary:  Weight Loss (Malnutrition):  (23% x 4 months)  Energy Intake (Malnutrition): less than or equal to 75% for greater than or equal to 1 month  Subcutaneous Fat (Malnutrition): severe depletion (suspecting)  Muscle Mass (Malnutrition): severe depletion  Fluid Accumulation (Malnutrition): other (see comments) (mild-moderate)    Interventions/Recommendations (treatment strategy):  1. Recommend goal of Impact Peptide 1.5 @ 40 ml/hr to provide 1440 kcal, 90 g pro, 739 ml water. Advance/adjust as appropriate. 2. Bolus TF rec: Impact Peptide 1.5 4 cans per day to provide 1500 kcal, 94 g pro, 772 mL water. 3. RD following.

## 2023-06-11 NOTE — CONSULTS
[facility]  Gastroenterology  Consult Note    Patient Name: Marely Hamilton  MRN: [id number]  Admission Date: 5/28/2023  Hospital Length of Stay: 14 days  Code Status: Full Code   Attending Provider: Nakul Casey MD   Consulting Provider: Benedicto Santillan MD  Primary Care Physician: Viktor Ross MD  Principal Problem:Non-convulsive status epilepticus    Inpatient consult to Gastroenterology  Consult performed by: Benedicto Santillan MD  Consult ordered by: Benedicto Santillan MD      Subjective:     HPI:   58 year old female with PMH significant for ETOH cirrhosis (associated with ascites and encephalopathy; declined for transplant here in 12/2015 due to malnutrition), bipolar disorder (on Lithium), and unspecified seizure disorder (on AEDs) who was admitted to [facility] on 05/28 as a transfer from [facility] for management of recurrent hepatic encephalopathy following initial admission on 05/18 for acute onset of encephalopathy associated with UTI secondary to Proteus, hypernatremia, and hypercalcemia.     GI consulted 06/11 for hypotension  requiring pressors and Hgb drop 7.4-->4.4.    Hospital course notable for status post treatment for UTI and electrolyte derangements without improvement in encephalopathy; CT and MRI brain unremarkable and initial EEG at [facility] negative for seizures. She is status post evaluation by psychiatry here with low suspicion for psychiatric disorder contributing to encephalopathy. Repeat EEG obtained on 05/31 concerning for frequent focal seizure activity approaching status epilepticus; AED's increased and patient transferred to neuro.ICU on 06/01 for closer monitoring with eventual need for intubation for airway protection on 06/02. EEG as of 06/02 without recurrent seizures and more consistent with severe encephalopathy. Patient now with progressive anemia and coagulopathy, but without overt signs of bleeding.     No prior EGD in system    Initiated on heparin gtt 06/10  for DVT.     VS notable for levo 1.6, and vaso (Both started at noon), remains intubated.   CBC w/ leukocytosis 7.6-->14.5, Hgb 7.4-->4.4, plts 60-->172. No recent blood products.  BMP w/ Na 144, BUN/ Cr 23/ 0.6  INR 06/10 was 5.4     CTA CAP pending    Objective:     Vitals:    06/11/23 1329   BP: (!) 91/52   Pulse: 101   Resp: 20   Temp:      Constitutional:  not in acute distress and well developed  HENT: Head: Normal, normocephalic, atraumatic.  Eyes: conjunctiva clear and sclera nonicteric  GI: soft, non-tender, without masses or organomegaly  Skin: normal color  Neurological: intubated, sedated      Assessment/Plan:     58 year old female with PMH significant for ETOH cirrhosis (associated with ascites and encephalopathy; declined for transplant here in 12/2015 due to malnutrition), bipolar disorder (on Lithium), and unspecified seizure disorder (on AEDs) who was admitted to [facility] on 05/28 as a transfer from [facility] for management of recurrent hepatic encephalopathy following initial admission on 05/18 for acute onset of encephalopathy associated with UTI secondary to Proteus, hypernatremia, and hypercalcemia.     GI consulted 06/11 for hypotension  requiring pressors and Hgb drop 7.4-->4.4.    At this time no overt GI blood loss.   Low suspicion for variceal hemorrhage    Would resuscitate and reverse INR. Please call if there is any overt GI blood loss. Agree with CTA CAP    Problem List:  Anemia, hypotension  Decompensated cirrhosis    Recommendations:  -Hold off on EGD for now  -F/u CTA CAP  -Place 2 large bore IVs, volume resuscitation per primary  -pRBC transfusions as needed, being careful to maintain Hb about 7-8 g/dL, but avoiding excessive transfusion to prevent over distention and aggravation of bleeding varix.  -Start Octreotide:  mcg IV bolus (usual bolus dose: 50 mcg) for suspected variceal bleed ; follow by continuous IV infusion of 25-50 mcg/hr for 3-5 days after confirmation of  variceal bleed.  -Correct coagulopathy (goal plt >50, INR <1.5) if present in patients without absolute contraindications.  -Start antibiotic prophylaxis: Ceftriaxone 1gm q24h initially (Other option includes: Ciprofloxacin)  -PPI 80 mg IV bolus once, then 8 mg/hour infusion or IV PPI 40 BID  -Avoid nonsteroidal agents, antiplatelet agents and anticoagulants if possible in patients without absolute contraindications    Thank you for involving us in the care of Marely Hamilton. Please call with any additional questions, concerns or changes in the patient's clinical status. We will continue to follow.     Benedicto Santillan MD  Gastroenterology Fellow PGY VI  [facility]

## 2023-06-11 NOTE — ANESTHESIA PREPROCEDURE EVALUATION
[facility]  Anesthesia Pre-Operative Evaluation         Patient Name: Marely Hamilton  YOB: 1965  MRN: [id number]    SUBJECTIVE:     Pre-operative evaluation for Procedure(s) (LRB):  EMBOLIZATION, BLOOD VESSEL (N/A)     06/11/2023    Marely Hamilton is a 58 y.o. female w/ a significant PMHx of ETOH cirrhosis (declined for transplant in 12/2015 due to malnutrition), bipolar disorder (on Lithium), and unspecified seizure disorder (on AEDs) admitted to [facility] on [place] as a transferfor management of recurrent hepatic encephalopathy. Initiated on heparin gtt 06/10 for DVT. CTA performed due to acute hemodynamic instability and significant drop in hematocrit significant for large retroperitoneal hematoma, plans for IR embolization. Remains intubated, pressor requirements weaned off this AM. Received 1u RBC, 3u FFP, and 1u plts     Telephone consent obtained from sister Zaria [phone]     Patient now presents for the above procedure(s).    TTE 6/3/23:   The left ventricle is normal in size with hyperdynamic systolic function. The estimated ejection fraction is 70%.   Normal right ventricular size with normal right ventricular systolic function.   Normal left ventricular diastolic function.   Mild left atrial enlargement.   Mechanically ventilated; cannot use inferior caval vein diameter to estimate central venous pressure.   Trivial pericardial effusion.   There is a left pleural effusion.    LDA:       Peripheral IV - Single Lumen 06/09/23 1513 18 G Anterior;Distal;Left Upper Arm (Active)   Site Assessment Clean;Dry;Intact;No redness;No swelling 06/11/23 1101   Extremity Assessment Distal to IV No abnormal discoloration 06/11/23 1101   Line Status Flushed 06/11/23 1101   Dressing Status Clean;Dry;Intact 06/11/23 1101   Dressing Intervention Integrity maintained 06/11/23 1101   Dressing Change Due 06/13/23 06/11/23 1101   Site Change Due 06/13/23 06/11/23 0701   Reason Not Rotated  Not due 06/11/23 1101   Number of days: 2            Peripheral IV - Single Lumen 06/09/23 1515 20 G Anterior;Left;Proximal Forearm (Active)   Site Assessment Clean;Dry;Intact;No redness;No swelling 06/11/23 1101   Extremity Assessment Distal to IV No abnormal discoloration 06/11/23 1101   Line Status Flushed 06/11/23 1101   Dressing Status Clean;Dry;Intact 06/11/23 1101   Dressing Intervention Integrity maintained 06/11/23 1101   Dressing Change Due 06/13/23 06/11/23 1101   Site Change Due 06/13/23 06/11/23 0701   Reason Not Rotated Not due 06/11/23 1101   Number of days: 2            Midline Catheter Insertion/Assessment  - Single Lumen 05/25/23 1005 Right basilic vein (medial side of arm) other (see comments) (Active)   $ Midline Charges (Upon insertion) Bedside Insertion Performed Pt < 3 Years Old 05/28/23 0941   Site Assessment Clean;Dry;Intact;No redness;No swelling 06/11/23 1101   IV Device Securement catheter securement device 06/11/23 1101   Line Status Flushed 06/11/23 1101   Dressing Type CHG impregnated dressing/sponge;Central line dressing 06/11/23 1101   Dressing Status Clean;Dry;Intact 06/11/23 1101   Dressing Intervention Integrity maintained 06/11/23 1101   Dressing Change Due 06/10/23 06/11/23 1101   Site Change Due 06/24/23 06/11/23 0701   Reason Not Rotated Not due 06/11/23 1101   Number of days: 17            NG/OG Tube 06/01/23 2200 Right nostril (Active)   Placement Check placement verified by aspirate characteristics;placement verified by x-ray 06/11/23 0302   Tolerance no signs/symptoms of discomfort 06/11/23 1101   Securement secured to nostril center 06/11/23 1101   Clamp Status/Tolerance no abdominal discomfort;no abdominal distention 06/11/23 1101   Suction Setting/Drainage Method suction at the bedside 06/11/23 1101   Insertion Site Appearance no redness, warmth, tenderness, skin breakdown, drainage 06/11/23 1101   Drainage None 06/11/23 0400   Flush/Irrigation flushed w/;water 06/11/23  0302   Feeding Type continuous;by pump 06/11/23 0400   Feeding Action feeding held 06/11/23 0400   Current Rate (mL/hr) 40 mL/hr 06/10/23 1902   Goal Rate (mL/hr) 40 mL/hr 06/10/23 1902   Intake (mL) 200 mL 06/10/23 0800   Water Bolus (mL) 500 mL 06/11/23 1101   Rate Formula Tube Feeding (mL/hr) 40 mL/hr 06/10/23 1902   Formula Name impact peptide 06/11/23 0302   Intake (mL) - Formula Tube Feeding 0 06/11/23 0701   Residual Amount (ml) 10 ml 06/09/23 1915   Number of days: 9            Rectal Tube 06/03/23 1300 (Active)   Balloon Inflation Volume (mL) 45 06/10/23 0700   Reposition drainage bags for BMS & Saldaña on opposite sides of bed 06/11/23 1101   Outcome gas expelled 06/11/23 1101   Stool Color Brown 06/11/23 1101   Insertion Site Appearance no redness, warmth, tenderness, skin breakdown, drainage 06/11/23 1101   Flush/Irrigation flushed w/;water 06/11/23 1101   Intake (mL) 40 mL 06/10/23 1902   Rectal Tube Output 250 mL 06/11/23 0602   Number of days: 8       Female External Urinary Catheter 06/10/23 1600 (Active)   Skin no redness;no breakdown 06/11/23 1101   Tolerance no signs/symptoms of discomfort 06/11/23 1101   Suction Continuous suction at 40 mmHg 06/11/23 0701   Date of last wick change 06/10/23 06/10/23 1902   Output (mL) 400 mL 06/10/23 1600   Number of days: 0       Prev airway:   Ad Hoc Intubation     Date/Time: 6/2/2023 11:45 AM  Performed by: Celestina Ortega DO  Authorized by: Celestina Ortega DO      Indications:  Airway protection  Diagnosis:  Status epilepticus  Patient Location:  ICU  Staff:     Other Anesthesia Staff:  Lydia Reynaga MD    Anesthesiologist Present: Yes    Intubation:     Induction:  Intravenous    Intubated:  Postinduction    Mask Ventilation:  Easy mask    Attempts:  1    Attempted By:  Resident anesthesiologist    Method of Intubation:  Video laryngoscopy    Blade:  Buitrago 3    Laryngeal View Grade: Grade I - full view of chords      Difficult Airway Encountered?: No       Complications:  None    Airway Device:  Oral endotracheal tube    Airway Device Size:  7.0    Style/Cuff Inflation:  Cuffed    Tube secured:  24    Secured at:  The lips    Placement Verified By:  Colorimetric ETCO2 device    Complicating Factors:  None    Findings Post-Intubation:  BS equal bilateral and atraumatic/condition of teeth unchanged    Drips: None documented.      Patient Active Problem List   Diagnosis    Cirrhosis, Laennec's    Thrombocytopenia    History of seizure    Glaucoma    hx of intentional Tylenol overdose    Alcohol abuse, in remission    AYESHA (acute kidney injury)    Macrocytic anemia    Chronic liver failure    Severe protein-calorie malnutrition    Hepatic encephalopathy    Breakthrough seizure    Bipolar 1 disorder, depressed, severe    Elevated LFTs    Bipolar 1 disorder    Bipolar disorder, manic    Urinary tract infection without hematuria    SVT (supraventricular tachycardia)    Alcoholic cirrhosis    Hepatic cirrhosis    Metabolic acidosis    Hyperammonemia    Hypernatremia    Hypercalcemia    Acute encephalopathy    Low body temperature    Hypophosphatemia    Refeeding syndrome    Acute hypoxemic respiratory failure    Non-convulsive status epilepticus    Acute liver failure without hepatic coma    Anemia    Hypotension (arterial)    DVT of deep femoral vein, right    Acute deep vein thrombosis (DVT) of brachial vein of right upper extremity       Review of patient's allergies indicates:   Allergen Reactions    Sulfa (sulfonamide antibiotics) Rash    Codeine Nausea And Vomiting       Current Inpatient Medications:   ARIPiprazole  5 mg Per NG tube Daily    calcium gluconate IVPB  1 g Intravenous Q1H    folic acid  1 mg Per NG tube Daily    levetiracetam  500 mg Per OG tube BID    pantoprazole  40 mg Intravenous BID    phenylephrine HCl in 0.9% NaCl        phytonadione ((AQUA-MEPHYTON) IVPB  10 mg Intravenous Daily    rifAXIMin  550 mg  Per NG tube BID    thiamine  100 mg Per NG tube Daily       No current facility-administered medications on file prior to encounter.     Current Outpatient Medications on File Prior to Encounter   Medication Sig Dispense Refill    folic acid (FOLVITE) 1 MG tablet Take 1 tablet (1 mg total) by mouth once daily. 30 tablet 2    hydrOXYzine (ATARAX) 50 MG tablet Take 50 mg by mouth every evening.      lactulose (CHRONULAC) 20 gram/30 mL Soln 45 mLs (30 g total) by Per NG tube route 2 (two) times daily.      levETIRAcetam (KEPPRA) 1000 MG tablet Take 1,000 mg by mouth 2 (two) times daily.      propranoloL (INDERAL) 20 MG tablet Take 20 mg by mouth once daily.      rifAXIMin (XIFAXAN) 550 mg Tab Take 1 tablet (550 mg total) by mouth 2 (two) times daily. 60 tablet 2    zonisamide (ZONEGRAN) 100 MG Cap Take 100 mg by mouth once daily.         Past Surgical History:   Procedure Laterality Date    COSMETIC SURGERY      ESOPHAGOGASTRODUODENOSCOPY         Social History     Socioeconomic History    Marital status: Single   Occupational History    Occupation: Disabled     Employer: DISABLED   Tobacco Use    Smoking status: Never    Smokeless tobacco: Never   Substance and Sexual Activity    Alcohol use: Not Currently     Comment: hx of ETOH abuse with cirrhosis of liver    Drug use: No    Sexual activity: Not Currently   Other Topics Concern    Patient feels they ought to cut down on drinking/drug use No    Patient annoyed by others criticizing their drinking/drug use No    Patient has felt bad or guilty about drinking/drug use No    Patient has had a drink/used drugs as an eye opener in the AM No   Social History Narrative    Pt has 1 older and 1 younger sister.  Her father committed suicide when she was 18.  She has a [employer] degree and worked as an , but she has been disabled since age 40.  She never  and has no children.  She is not dating and claims no current friends.  She currently lives  with her mother along with her pet dog.  She denies any hobbies and says she is not Adventism.     Social Determinants of Health     Financial Resource Strain: Unknown    Difficulty of Paying Living Expenses: Patient refused   Food Insecurity: Unknown    Worried About Running Out of Food in the Last Year: Patient refused    Ran Out of Food in the Last Year: Patient refused   Transportation Needs: Unknown    Lack of Transportation (Medical): Patient refused    Lack of Transportation (Non-Medical): Patient refused   Physical Activity: Unknown    Days of Exercise per Week: Patient refused    Minutes of Exercise per Session: Patient refused   Stress: Unknown    Feeling of Stress : Patient refused   Social Connections: Unknown    Frequency of Communication with Friends and Family: Patient refused    Frequency of Social Gatherings with Friends and Family: Patient refused    Attends Nondenominational Services: Patient refused    Active Member of Clubs or Organizations: Patient refused    Attends Club or Organization Meetings: Patient refused    Marital Status: Patient refused   Housing Stability: Unknown    Unable to Pay for Housing in the Last Year: Patient refused    Number of Places Lived in the Last Year: 1    Unstable Housing in the Last Year: Patient refused       OBJECTIVE:     Vital Signs Range (Last 24H):  Temp:  [36.3 °C (97.3 °F)-37.2 °C (98.9 °F)]   Pulse:  []   Resp:  [9-30]   BP: ()/(33-70)   SpO2:  [78 %-100 %]   Arterial Line BP: (128-132)/(42-50)       Significant Labs:  Lab Results   Component Value Date    WBC 14.47 (H) 06/11/2023    HGB 4.4 (LL) 06/11/2023    HCT 14.8 (LL) 06/11/2023     06/11/2023    CHOL 138 12/11/2020    TRIG 39 12/11/2020    HDL 48 12/11/2020    ALT 20 06/11/2023    AST 32 06/11/2023     06/11/2023    K 3.8 06/11/2023     (H) 06/11/2023    CREATININE 0.6 06/11/2023    BUN 23 (H) 06/11/2023    CO2 18 (L) 06/11/2023    TSH 0.582 05/24/2023     INR 2.5 (H) 06/11/2023    HGBA1C <4.0 (A) 05/29/2023       Diagnostic Studies: No relevant studies.    EKG:   Results for orders placed or performed during the hospital encounter of 05/28/23   EKG 12-lead    Collection Time: 06/02/23  9:15 AM    Narrative    Test Reason : I49.9,    Vent. Rate : 149 BPM     Atrial Rate : 271 BPM     P-R Int : 000 ms          QRS Dur : 074 ms      QT Int : 256 ms       P-R-T Axes : 000 049 166 degrees     QTc Int : 403 ms    AF/ Atrial flutter with variable A-V block  Nonspecific T wave abnormality  Abnormal ECG  When compared with ECG of 01-JUN-2023 11:49,  AF/ Atrial flutter has replaced Sinus rhythm  Vent. rate has increased BY  55 BPM  Nonspecific T wave abnormality has replaced inverted T waves in   Anterior-lateral leads  Confirmed by Abimael CLEMENTS MD ([id number]) on 6/2/2023 10:06:41 AM    Referred By: IAN RADFORD           Confirmed By:Abimael CLEMENTS MD       2D ECHO:  TTE:  Results for orders placed or performed during the hospital encounter of 05/28/23   Echo   Result Value Ref Range    Ascending aorta 2.96 cm    STJ 2.94 cm    AV mean gradient 7 mmHg    Ao peak delgado 1.65 m/s    Ao VTI 30.63 cm    IVRT 70.41 msec    IVS 0.95 0.6 - 1.1 cm    LA size 2.93 cm    Left Atrium Major Axis 6.06 cm    Left Atrium Minor Axis 5.87 cm    LVIDd 3.89 3.5 - 6.0 cm    LVIDs 2.80 2.1 - 4.0 cm    LVOT diameter 1.89 cm    LVOT peak VTI 22.99 cm    Posterior Wall 0.71 0.6 - 1.1 cm    MV Peak A Delgado 0.97 m/s    E wave deceleration time 108.18 msec    MV Peak E Delgado 1.21 m/s    RA Major Axis 5.33 cm    RA Width 3.40 cm    RVDD 3.07 cm    Sinus 3.16 cm    TAPSE 2.29 cm    TR Max Delgado 2.00 m/s    LA WIDTH 4.46 cm    MV stenosis pressure 1/2 time 31.37 ms    LV Diastolic Volume 65.51 mL    LV Systolic Volume 29.45 mL    LVOT peak delgado 1.24 m/s    TDI LATERAL 0.16 m/s    TDI SEPTAL 0.16 m/s    LA volume (mod) 50.00 cm3    LV LATERAL E/E' RATIO 7.56 m/s    LV SEPTAL E/E' RATIO 7.56 m/s    FS 28 %    LA volume 66.24  cm3    LV mass 93.86 g    Left Ventricle Relative Wall Thickness 0.37 cm    AV valve area 2.10 cm2    AV Velocity Ratio 0.75     AV index (prosthetic) 0.75     MV valve area p 1/2 method 7.01 cm2    E/A ratio 1.25     Mean e' 0.16 m/s    LVOT area 2.8 cm2    LVOT stroke volume 64.47 cm3    AV peak gradient 11 mmHg    E/E' ratio 7.56 m/s    LV Systolic Volume Index 18.4 mL/m2    LV Diastolic Volume Index 40.94 mL/m2    LA Volume Index 41.4 mL/m2    LV Mass Index 59 g/m2    Triscuspid Valve Regurgitation Peak Gradient 16 mmHg    LA Volume Index (Mod) 31.3 mL/m2    BSA 1.62 m2    EF 70 %    RV S' 21 cm/s    Narrative    · The left ventricle is normal in size with hyperdynamic systolic   function. The estimated ejection fraction is 70%.  · Normal right ventricular size with normal right ventricular systolic   function.  · Normal left ventricular diastolic function.  · Mild left atrial enlargement.  · Mechanically ventilated; cannot use inferior caval vein diameter to   estimate central venous pressure.  · Trivial pericardial effusion.  · There is a left pleural effusion.          JOHNNY:  No results found for this or any previous visit.    ASSESSMENT/PLAN:                                                                                                                 06/11/2023  Marely Hamilton is a 58 y.o., female.      Pre-op Assessment    I have reviewed the Patient Summary Reports.     I have reviewed the Nursing Notes. I have reviewed the NPO Status.   I have reviewed the Medications.     Review of Systems  Anesthesia Hx:  No problems with previous Anesthesia  History of prior surgery of interest to airway management or planning: Denies Family Hx of Anesthesia complications.   Denies Personal Hx of Anesthesia complications.   Hematology/Oncology:         -- Anemia:   Cardiovascular:   Exercise tolerance: good Denies Hypertension.  Denies CAD.    Denies CABG/stent. Dysrhythmias   Denies CHF. no hyperlipidemia     Pulmonary:   Denies COPD.  Denies Asthma.  Denies Sleep Apnea.    Renal/:   Chronic Renal Disease, ARF    Hepatic/GI:   Denies GERD. Liver Disease, ETOH cirrhosis   Musculoskeletal:   Arthritis     Neurological:   Denies CVA.  Denies Headaches. Seizures    Endocrine:   Denies Diabetes.  Denies Obesity / BMI > 30  Psych:   Psychiatric History          Physical Exam  General: Lethargic, Cachexia and Somnolent  Intubated  Airway:  Mallampati: unable to assess   TM Distance: Normal  Pre-Existing Airway: Oral Endotracheal tube        Anesthesia Plan  Type of Anesthesia, risks & benefits discussed:    Anesthesia Type: Gen ETT  Intra-op Monitoring Plan: Standard ASA Monitors, Art Line and Central Line  Post Op Pain Control Plan: multimodal analgesia and IV/PO Opioids PRN  Induction:  IV  Airway Plan: Direct and Video, Post-Induction  Informed Consent: Informed consent signed with the Patient representative and all parties understand the risks and agree with anesthesia plan.  All questions answered. Patient consented to blood products? Yes  ASA Score: 4 Emergent  Day of Surgery Review of History & Physical: H&P Update referred to the surgeon/provider.  Anesthesia Plan Notes: 4u RBC and 4 FFP to be brought to IR suite    Ready For Surgery From Anesthesia Perspective.     .

## 2023-06-11 NOTE — NURSING
Pt transported with IR RN and CRNA to [facility].  Pt reconnected to monitors and ventilator.  Bedside report given.  Goal: SBP < 140     WCTM.

## 2023-06-11 NOTE — PLAN OF CARE
Embolization completed, pt tolerated well. No apparent distress noted.Ascade deployed, elevate HOB @ 1840, Dressing applied CDI. Pt transferred to 9099 via bed accompanied by IR and Anesthesia teams. Report given to Mayra Daly RN at bedside.

## 2023-06-11 NOTE — PROCEDURES
IR POST PROCEDURE NOTE    Date of Procedure: 6/11/2023    Procedure: Aortogram, selective lumbar angiography, selective internal iliac angiography, Gelfoam embolization    Pre-Procedure Diagnosis: Retroperitoneal hemorrhage    Post-Procedure Diagnosis: Same    Procedure Personnel: Ismael Brown MD and Quentin Yang MD    Written Informed Consent Obtained: Yes    Specimen Removed: None    Estimated Blood Loss: Minimal    Findings: Angiography performed, demonstrating foci of active arterial extravasation in left lumbar and internal iliac branches. Gelfoam embolization of these vessels was performed to stasis. 5 Fr Vascade at right groin access site.    Complications: None immediate.     Plan: Care per ICU team.      Quentin Yang MD  Fellow, Dept. Of Interventional Radiology  [facility]

## 2023-06-11 NOTE — PLAN OF CARE
Pt arrived to IR room 188 for RP BLEED EMBO, no acute distress noted. Orders, Consents and labs reviewed on chart. Anesthesia at bedside.

## 2023-06-11 NOTE — ASSESSMENT & PLAN NOTE
Continue Lactulose and Rifaximin   Ammonia elevated but downtrending with bowel reg  Follow coags and fibrinogen

## 2023-06-11 NOTE — ASSESSMENT & PLAN NOTE
Noted on 6/8  Argatroban per Heme recs - transition to heparin    HELD 6/11 in setting of hypovolemic shock 2/2 retroperitoneal hemorrhage

## 2023-06-11 NOTE — PROCEDURES
"Marely Hamilton is a 58 y.o. female patient.    Temp: 98.9 °F (37.2 °C) (06/11/23 1101)  Pulse: 102 (06/11/23 1332)  Resp: (!) 23 (06/11/23 1332)  BP: (!) 95/54 (06/11/23 1332)  SpO2: (!) 92 % (06/11/23 1332)  Weight: 59.9 kg (132 lb) (06/03/23 1800)  Height: 5' 2" (157.5 cm) (06/11/23 1136)       Central Line    Date/Time: 6/11/2023 1:42 PM  Performed by: Cameron Yadav MD  Authorized by: Cameron Yadav MD     Location procedure was performed:  [facility]  Assisting Provider:  Nakul Casey MD  Consent Done ?:  Emergent Situation  Time out complete?: Verified correct patient, procedure, equipment, staff, and site/side    Indications:  Med administration, vascular access and hemodynamic monitoring  Anesthesia:  See MAR for details  Preparation:  Skin prepped with ChloraPrep  Skin prep agent dried: Skin prep agent completely dried prior to procedure    Sterile barriers: All five maximal sterile barriers used - gloves, gown, cap, mask and large sterile sheet    Hand hygiene: Hand hygiene performed immediately prior to central venous catheter insertion    Location:  Right internal jugular  Catheter type:  Triple lumen  Catheter size:  13 Fr  Ultrasound guidance: Yes    Vessel Caliber:  Small and medium   patent  Comprressibility:  Normal  Needle advanced into vessel with real time ultrasound guidance.    Guidewire confirmed in vessel.    Steril sheath on probe.    Sterile gel used.  Manometry: Yes    Number of attempts:  2  Securement:  Line sutured, chlorhexidine patch, sterile dressing applied and blood return through all ports  Complications: No    Specimens: No    Implants: No    XRay:  Placement verified by x-ray    6/11/2023    "

## 2023-06-11 NOTE — ANESTHESIA POSTPROCEDURE EVALUATION
Anesthesia Post Evaluation    Patient: Marely Hamilton    Procedure(s) Performed: Procedure(s) (LRB):  EMBOLIZATION, BLOOD VESSEL (N/A)    Final Anesthesia Type: general      Patient location during evaluation: ICU  Patient participation: No - Unable to Participate, Intubation  Level of consciousness: sedated  Post-procedure vital signs: reviewed and stable  Pain management: adequate  Airway patency: patent    PONV status at discharge: No PONV  Anesthetic complications: no      Cardiovascular status: hemodynamically stable  Respiratory status: ETT, intubated and ventilator  Hydration status: euvolemic  Follow-up not needed.          Vitals Value Taken Time   /70 06/11/23 1822   Temp 36.8 °C (98.3 °F) 06/11/23 1710   Pulse 74 06/11/23 1826   Resp 13 06/11/23 1826   SpO2 91 % 06/11/23 1826   Vitals shown include unvalidated device data.      No case tracking events are documented in the log.      Pain/Jaison Score: Pain Rating Prior to Med Admin: 5 (6/11/2023 11:58 AM)

## 2023-06-11 NOTE — ASSESSMENT & PLAN NOTE
6/11 --> acute hypotension, hypoxia --> hypovolemic shock --> Large R peritoneal hematoma --> IR for class A embo

## 2023-06-11 NOTE — SUBJECTIVE & OBJECTIVE
Interval History:  As above    Review of Systems   Unable to perform ROS: Intubated     Objective:     Vitals:  Temp: 98.7 °F (37.1 °C)  Pulse: 103  Rhythm: normal sinus rhythm, sinus tachycardia  BP: 120/71  MAP (mmHg): 82  Resp: (!) 21  SpO2: (!) 92 %  Oxygen Concentration (%): 60  Vent Mode: A/C  Set Rate: 12 BPM  Pressure Support: 10 cmH20  PEEP/CPAP: 5 cmH20  Peak Airway Pressure: 18 cmH20  Mean Airway Pressure: 11 cmH20  Plateau Pressure: 0 cmH20    Temp  Min: 97.3 °F (36.3 °C)  Max: 98.9 °F (37.2 °C)  Pulse  Min: 89  Max: 151  BP  Min: 60/33  Max: 153/70  MAP (mmHg)  Min: 41  Max: 100  Resp  Min: 9  Max: 30  SpO2  Min: 78 %  Max: 100 %  Oxygen Concentration (%)  Min: 40  Max: 60    06/10 0701 - 06/11 0700  In: 2665.2 [I.V.:162]  Out: 1050 [Urine:800]   Unmeasured Output  Urine Occurrence: 0  Stool Occurrence: 1  Emesis Occurrence: 0  Pad Count: 2        Physical Exam  Vitals and nursing note reviewed.   Constitutional:       General: She is not in acute distress.     Appearance: She is ill-appearing.      Comments: Opens eye spontaneously, following commands   HENT:      Head: Normocephalic and atraumatic.   Eyes:      General: Scleral icterus present.      Extraocular Movements: Extraocular movements intact.      Pupils: Pupils are equal, round, and reactive to light.   Cardiovascular:      Rate and Rhythm: Normal rate.   Pulmonary:      Comments:   Intubated and mechanically ventilated   Abdominal:      Palpations: Abdomen is soft.   Musculoskeletal:      Cervical back: Neck supple.   Skin:     General: Skin is warm.   Neurological:      Mental Status: She is alert.      Comments:   Alert  Intubated, unable to test orientation  Follows commands x4  Pupils equal, reactive  Face grossly symmetric   Moves all extremities antigravity and to commands    Medications:  ContinuousNORepinephrine bitartrate-D5W  vasopressin, Last Rate: 0.04 Units/min (06/11/23 1301)    ScheduledARIPiprazole, 5 mg, Daily  calcium  gluconate IVPB, 1 g, Q1H  folic acid, 1 mg, Daily  levetiracetam, 500 mg, BID  pantoprazole, 40 mg, BID  phenylephrine HCl in 0.9% NaCl, ,   phytonadione ((AQUA-MEPHYTON) IVPB, 10 mg, Daily  rifAXIMin, 550 mg, BID  thiamine, 100 mg, Daily    PRNsodium chloride, , Q24H PRN  sodium chloride, , Q24H PRN  sodium chloride, , Q24H PRN  sodium chloride, , Q24H PRN  sodium chloride, , Q24H PRN  sodium chloride, , Q24H PRN  sodium chloride, , Q24H PRN  sodium chloride, , Q24H PRN  sodium chloride, , Q24H PRN  sodium chloride, , Q24H PRN  aluminum-magnesium hydroxide-simethicone, 30 mL, QID PRN  dextrose 10%, 12.5 g, PRN  dextrose 10%, 25 g, PRN  dextrose, 15 g, PRN  dextrose, 30 g, PRN  fentaNYL, 50 mcg, Q1H PRN  glucagon (human recombinant), 1 mg, PRN  insulin aspart U-100, 1-10 Units, Q6H PRN  iohexoL, 200 mL, ONCE PRN  LIDOcaine HCL 10 mg/ml (1%), , PRN  magnesium oxide, 800 mg, PRN  magnesium oxide, 800 mg, PRN  melatonin, 6 mg, Nightly PRN  naloxone, 0.02 mg, PRN  ondansetron, 4 mg, Q8H PRN  potassium bicarbonate, 35 mEq, PRN  potassium bicarbonate, 50 mEq, PRN  potassium bicarbonate, 60 mEq, PRN  potassium, sodium phosphates, 2 packet, PRN  potassium, sodium phosphates, 2 packet, PRN  potassium, sodium phosphates, 2 packet, PRN  simethicone, 1 tablet, QID PRN  sodium chloride 0.9%, 10 mL, Q8H PRN      Today I personally reviewed pertinent medications, imaging, laboratory results, notably: n    Hemoglobin 4.4  CTA Abd/Pelvis with large retroperitoneal hematoma    Diet  Diet NPO  Diet NPO

## 2023-06-11 NOTE — PROGRESS NOTES
[facility] - Neuro Critical Care  Neurocritical Care  Progress Note    Admit Date: 5/28/2023  Service Date: 06/11/2023  Length of Stay: 14    Subjective:     Chief Complaint: Non-convulsive status epilepticus    History of Present Illness: Marely Hamilton is a 58 year old female with a medical history significant for EtOH induced hepatic cirrhosis, seizure disorder on outpatient LEV and ZNS and bipolar disorder who was admitted to [facility] on 5/28 as a transfer from OSH for evaluation of persistent encephalopathy concerning for NCSE vs hepatic encephalopathy. History is obtained from chart as patient is unresponsive and unable to assist. Initially presented to [facility] on 5/18 for encephalopathy and thought to be multifactorial including UTI (Proteus mirabilis s/p CTX course), hypercalcemia 2/2 lithium toxicity (Lithium changed to Abilify per Psych), as well as hepatic encephalopathy secondary to medication non compliance. She was treated with lactulose and rifaximin with no improvement in her mental status. EEG showed generalized slowing as well as triphasic discharges in the left hemisphere. MRI Brain WO was unremarkable for acute neurologic change. Decision was made to transfer patient to [facility] for higher level of care and ongoing evaluation and management. On arrival, mental status exam has waxed and waned with patient being intermittently verbal and following commands. NH4 48.    NCC is consulted on hospital day 4 for admission after EEG showed frequent left hemispheric electrographic seizures lasting on average 10-30 seconds with prolonged seizures over 1.5 hours also noted. Per chart review, patient home dose of LEV increased from 1000mg to 1500mg BID, ZNS increased to 200mg. Vimpat 150mg BID added per General Neurology (had not been given prior to consult). On initial evaluation, patient is not responsive to voice or pain. Upward gaze noted. Decision made to transfer patient to [facility] for higher level of care and  airway watch.       Hospital Course: 06/02/2023 Tachycardic and hypotensive, transferred for development of mostly right hemispheric status, LOC exam remains poor  Intubated for airway protection, EEG has changed to mostly include triphasics with no definite seizure activity per Dr Boss, propofol stopped and AEDs simplified to keppra 1000mg bid only, wean off pressor, give colloids with crystalloid resuscitation  06/03/2023: remains on persistant significant pressor support despite adequate hydration, problems with third spacing and may have pulmonary hypertension as well, consider trial of stress steroids if hgb stabilizes  06/04/2023: levo down to 0.08mcg, start and advance TFs, vaso at 0.04U, ammonia has been stable, slight rise in tbili, check direct bili, hgb and plts improved, no clear source of bleeding, no source of bleeding on CT abdomen, consider superimposed hemolysis  06/05/2023 NAEON. Neurologic exam continues to improve, following commands in all extremities. Levo 0.02, weaning as able. Vaso discontinued, MAP>65. Daily SBT, monitor and hold TF at midnight for possible extubation tomorrow. Hemolysis work up ongoing, trending CBC. Continue CTX for SBP prophylaxis.   06/06/2023 Awake and following commands. SBT with low tidal volumes. Remains intubated for airway protection at this time with possible extubation tomorrow with continued neurologic improvement. Levo discontinued, maintaining SBP<65. Concern for hemolytic anemia related to autoimmune process vs hepatic dysfunction (elevated reticulocyte count and decreased haptoglobin).  06/07/2023 Rested on AC overnight due to low TV and fatigue. SBT with pressure support 10/5 this am, weaning ventilator as tolerated. Continue tube feeds with goal to optimize nutrition. Ammonia elevated, continue lactulose to encourage bowel movement.   06/08/2023 Failed SBT due to apnea. Some breaths on SIMV. Awake and following commands. Ammonia trending down (44) with  BMs, continue lactulose. Advance nutritional support with goal to increase strength towards extubation. Plt transfusions PRN. Cryo for fibrinogen <100.  06/09/2023 Venous US with R brachial and femoral DVT. Dicussed with Hematology with recommendation to start Argatroban with plts>50. Daily SBT with apnea, maintain on SIMV with backup rate of 10. Hyperammonemia resolved.   06/10/2023 patient is more alert and awake, tolerated SBT. Hematology agreed to switch to heparin for DVT given negative for HIT  06/11/2023   On AM rounds, patient was noted to be hypoxic and tachycardic. Hypoxia resolved with ventilator changes but patient continued to appear uncomfortable. Patient lied flat with noted improvement in oxygenation. Patient became acutely hypotensive, tachycardiac and hypoxic not responsive to ventilator changes. IVF bolus + Burton bump x3 + initiation of vasopressin due to concern for hypovolemic shock. Levophed added due to persistent hypertension. L femoral arterial line and R IJ trialysis placed emergently. STAT Hemoglobin 4.4. Received protamine for heparin reversal, 3u Plts and 3 FFP and 2 pRBC. STAT CTA abdo/pelvis with L retroperitoneal hematoma with + spot sign. IR consulted and patient taken class A for diagnostic angiogram with possible emobolization. Pressors weaned after volume resuscitation. Family updated over phone.       Interval History:  As above    Review of Systems   Unable to perform ROS: Intubated     Objective:     Vitals:  Temp: 98.7 °F (37.1 °C)  Pulse: 103  Rhythm: normal sinus rhythm, sinus tachycardia  BP: 120/71  MAP (mmHg): 82  Resp: (!) 21  SpO2: (!) 92 %  Oxygen Concentration (%): 60  Vent Mode: A/C  Set Rate: 12 BPM  Pressure Support: 10 cmH20  PEEP/CPAP: 5 cmH20  Peak Airway Pressure: 18 cmH20  Mean Airway Pressure: 11 cmH20  Plateau Pressure: 0 cmH20    Temp  Min: 97.3 °F (36.3 °C)  Max: 98.9 °F (37.2 °C)  Pulse  Min: 89  Max: 151  BP  Min: 60/33  Max: 153/70  MAP (mmHg)  Min: 41   Max: 100  Resp  Min: 9  Max: 30  SpO2  Min: 78 %  Max: 100 %  Oxygen Concentration (%)  Min: 40  Max: 60    06/10 0701 - 06/11 0700  In: 2665.2 [I.V.:162]  Out: 1050 [Urine:800]   Unmeasured Output  Urine Occurrence: 0  Stool Occurrence: 1  Emesis Occurrence: 0  Pad Count: 2        Physical Exam  Vitals and nursing note reviewed.   Constitutional:       General: She is not in acute distress.     Appearance: She is ill-appearing.      Comments: Opens eye spontaneously, following commands   HENT:      Head: Normocephalic and atraumatic.   Eyes:      General: Scleral icterus present.      Extraocular Movements: Extraocular movements intact.      Pupils: Pupils are equal, round, and reactive to light.   Cardiovascular:      Rate and Rhythm: Normal rate.   Pulmonary:      Comments:   Intubated and mechanically ventilated   Abdominal:      Palpations: Abdomen is soft.   Musculoskeletal:      Cervical back: Neck supple.   Skin:     General: Skin is warm.   Neurological:      Mental Status: She is alert.      Comments:   Alert  Intubated, unable to test orientation  Follows commands x4  Pupils equal, reactive  Face grossly symmetric   Moves all extremities antigravity and to commands    Medications:  ContinuousNORepinephrine bitartrate-D5W  vasopressin, Last Rate: 0.04 Units/min (06/11/23 1301)    ScheduledARIPiprazole, 5 mg, Daily  calcium gluconate IVPB, 1 g, Q1H  folic acid, 1 mg, Daily  levetiracetam, 500 mg, BID  pantoprazole, 40 mg, BID  phenylephrine HCl in 0.9% NaCl, ,   phytonadione ((AQUA-MEPHYTON) IVPB, 10 mg, Daily  rifAXIMin, 550 mg, BID  thiamine, 100 mg, Daily    PRNsodium chloride, , Q24H PRN  sodium chloride, , Q24H PRN  sodium chloride, , Q24H PRN  sodium chloride, , Q24H PRN  sodium chloride, , Q24H PRN  sodium chloride, , Q24H PRN  sodium chloride, , Q24H PRN  sodium chloride, , Q24H PRN  sodium chloride, , Q24H PRN  sodium chloride, , Q24H PRN  aluminum-magnesium hydroxide-simethicone, 30 mL, QID  PRN  dextrose 10%, 12.5 g, PRN  dextrose 10%, 25 g, PRN  dextrose, 15 g, PRN  dextrose, 30 g, PRN  fentaNYL, 50 mcg, Q1H PRN  glucagon (human recombinant), 1 mg, PRN  insulin aspart U-100, 1-10 Units, Q6H PRN  iohexoL, 200 mL, ONCE PRN  LIDOcaine HCL 10 mg/ml (1%), , PRN  magnesium oxide, 800 mg, PRN  magnesium oxide, 800 mg, PRN  melatonin, 6 mg, Nightly PRN  naloxone, 0.02 mg, PRN  ondansetron, 4 mg, Q8H PRN  potassium bicarbonate, 35 mEq, PRN  potassium bicarbonate, 50 mEq, PRN  potassium bicarbonate, 60 mEq, PRN  potassium, sodium phosphates, 2 packet, PRN  potassium, sodium phosphates, 2 packet, PRN  potassium, sodium phosphates, 2 packet, PRN  simethicone, 1 tablet, QID PRN  sodium chloride 0.9%, 10 mL, Q8H PRN      Today I personally reviewed pertinent medications, imaging, laboratory results, notably: n    Hemoglobin 4.4  CTA Abd/Pelvis with large retroperitoneal hematoma    Diet  Diet NPO  Diet NPO          Assessment/Plan:     Neuro  * Non-convulsive status epilepticus  58F with EtOH induced hepatic cirrhosis, seizure disorder on outpatient LEV and ZNS and bipolar disorder admitted on 5/28 for persistent encephalopathy.    - UTI on admit (Proteus mirabilis), now s/p CTX course  - Hypercalcemia 2/2 lithium toxicity (Lithium changed to Abilify per Psych)  - Hepatic encephalopathy 2/2 to medication non compliance, treated with lactulose and rifaximin     MRI Brain WO was unremarkable for acute neurologic change  EEG w/ frequent left hemispheric electrographic seizures and NCSE  Repeat EEGs without seizure activity x 24 hours, now resolved    6/11 --> acute hypotension, hypoxia --> hypovolemic shock --> Large R peritoneal hematoma --> IR for class A embo     - Continued admission to [facility]  - Q1h neurochecks while in ICU  - Q1h vitals while in ICU  - HOB@30  - Keppra 500mg BID  - Thiamine replacement   - Lactulose, titrate to BM  - MAP>65  - Daily CMP, Mag, Phos - replete electrolytes PRN  - Lipid panel, A1c,  Coags reviewed  - Daily CBC, transfuse PRN  - Heparin held in setting of acute bleed  - PT/OT/SLP as appropriate  - CM/SW consult for dispo planning    Acute encephalopathy  Multifactorial   See Non-convulsive status epilepticus    Breakthrough seizure  See Non-convulsive status epilepticus    Psychiatric  Bipolar 1 disorder  See Non-convulsive status epilepticus    Alcohol abuse, in remission  See Non-convulsive status epilepticus    Pulmonary  Acute hypoxemic respiratory failure  Intubated today for airway protection  Difficult intubation due to far anterior airway    Cardiac/Vascular  Hypovolemic shock  6/11 --> acute hypotension, hypoxia --> hypovolemic shock --> Large R peritoneal hematoma --> IR for class A embo     Hypotension (arterial)  Hypotensive 2/2 hypovolemic shock on 6/11  CTA with large retroperitoneal hematoma, IR consulted with plan for class A embolization     Renal/  Hypernatremia  Daily CMP  Improved with free water administration, RESOLVED     Hematology  Acute deep vein thrombosis (DVT) of brachial vein of right upper extremity  Noted on 6/8  Argatroban per Heme recs - transition to heparin 6/10    HELD 6/11 in setting of retroperitoneal bleed    DVT of deep femoral vein, right  Noted on 6/8  Argatroban per Heme recs - transition to heparin    HELD 6/11 in setting of hypovolemic shock 2/2 retroperitoneal hemorrhage     Thrombocytopenia  Likely secondary to hepatic cirrhosis and development of splenomegaly  Has worsened and associated with hgb drop, transfuse to >50K  No signs of bleeding in GI tract or on CT, hemolysis on labs  Hematology consulted, tested negative for HIT    6/11 --> acute hypotension, hypoxia --> hypovolemic shock --> Large R peritoneal hematoma --> IR for class A embo     Oncology  Anemia  Chronic    Macrocytic anemia  Follow CBC    Endocrine  Hyperammonemia  Stable on current medical regimen   See Non-convulsive status epilepticus      Severe protein-calorie  malnutrition  Nutrition consulted. Most recent weight and BMI monitored-     Measurements:  Wt Readings from Last 1 Encounters:   06/03/23 59.9 kg (132 lb)   Body mass index is 24.14 kg/m².    Patient has been screened and assessed by RD.    Malnutrition Type:  Context: social/environmental circumstances  Level: severe    Malnutrition Characteristic Summary:  Weight Loss (Malnutrition):  (23% x 4 months)  Energy Intake (Malnutrition): less than or equal to 75% for greater than or equal to 1 month  Subcutaneous Fat (Malnutrition): severe depletion (suspecting)  Muscle Mass (Malnutrition): severe depletion  Fluid Accumulation (Malnutrition): other (see comments) (mild-moderate)    Interventions/Recommendations (treatment strategy):  1. Recommend goal of Impact Peptide 1.5 @ 40 ml/hr to provide 1440 kcal, 90 g pro, 739 ml water. Advance/adjust as appropriate. 2. Bolus TF rec: Impact Peptide 1.5 4 cans per day to provide 1500 kcal, 94 g pro, 772 mL water. 3. RD following.    GI  Acute liver failure without hepatic coma  GI/Hepatology following    Hepatic cirrhosis  Continue Lactulose and Rifaximin   Ammonia elevated but downtrending with bowel reg  Follow coags and fibrinogen      Hepatic encephalopathy  Continue lactulose   See Non-convulsive status epilepticus    Other  Retroperitoneal hemorrhage  6/11 --> acute hypotension, hypoxia --> hypovolemic shock --> Large R peritoneal hematoma --> IR for class A embo           The patient is being Prophylaxed for:  Venous Thromboembolism with: Mechanical  Stress Ulcer with: H2B  Ventilator Pneumonia with: chlorhexidine oral care    Activity Orders          Diet NPO: NPO starting at 06/01 1055    Turn patient starting at 05/28 5208    Progressive Mobility Protocol (mobilize patient to their highest level of functioning at least twice daily) starting at 05/28 2000        Full Code    Cameron Yadav MD  Neurocritical Care  [facility] - Neuro Critical Care

## 2023-06-11 NOTE — TRANSFER OF CARE
"Anesthesia Transfer of Care Note    Patient: Marely Hamilton    Procedure(s) Performed: Procedure(s) (LRB):  EMBOLIZATION, BLOOD VESSEL (N/A)    Patient location: ICU    Anesthesia Type: general    Transport from OR: Continuous ECG monitoring in transport. Continuous SpO2 monitoring in transport. Continuos invasive BP monitoring in transport. Transported from OR intubated on 100% O2  with adequate ventilation controlled by transport ventilator. Upon arrival to PACU/ICU, patient attached to ventilator and auscultated to confirm bilateral breath sounds and adequate TV    Post pain: adequate analgesia    Post assessment: no apparent anesthetic complications    Post vital signs: stable    Level of consciousness: awake    Nausea/Vomiting: no nausea/vomiting    Complications: none    Transfer of care protocol was followed      Last vitals:   Visit Vitals  BP (!) 132/80   Pulse 100   Temp 36.7 °C (98 °F)   Resp (!) 37   Ht 5' 2" (1.575 m)   Wt 59.9 kg (132 lb)   SpO2 (!) 95%   BMI 24.14 kg/m²     "

## 2023-06-11 NOTE — RESPIRATORY THERAPY
Patient returned from IR on transport vent with ETT secured, patient placed on  vent with settings as charted, will continue to monitor status.

## 2023-06-11 NOTE — PROCEDURES
"Marely Hamilton is a 58 y.o. female patient.    Temp: 98.9 °F (37.2 °C) (06/11/23 1101)  Pulse: 102 (06/11/23 1332)  Resp: (!) 23 (06/11/23 1332)  BP: (!) 95/54 (06/11/23 1332)  SpO2: (!) 92 % (06/11/23 1332)  Weight: 59.9 kg (132 lb) (06/03/23 1800)  Height: 5' 2" (157.5 cm) (06/11/23 1136)       Arterial Line    Date/Time: 6/11/2023 1:43 PM  Location procedure was performed: [facility] NEURO [facility]  Performed by: Cameron Yadav MD  Authorized by: Cameron Yadav MD   Consent Done: Emergent Situation  Preparation: Patient was prepped and draped in the usual sterile fashion.  Indications: multiple ABGs, respiratory failure and hemodynamic monitoring  Location: left femoral  Anesthesia: see MAR for details  Dequan's test normal: yes  Number of attempts: 2  Post-procedure: line sutured and dressing applied  Post-procedure CMS: normal  Patient tolerance: Patient tolerated the procedure well with no immediate complications      Cameron Yadav MD, MPH  Neurocritical Care Fellow  [facility]  [address] [place] [id number]  6/11/2023    "

## 2023-06-11 NOTE — PLAN OF CARE
[facility] Care Plan    POC reviewed with Marely Hamilton and family at 1400. Pt unable to verbalize understanding. Questions and concerns addressed. No acute events today. Pt progressing toward goals. Will continue to monitor. See below and flowsheets for full assessment and VS info.     - See previous notes regarding significant events.     Is this a stroke patient? no    Neuro:  Cheryl Coma Scale  Best Eye Response: 4-->(E4) spontaneous  Best Motor Response: 6-->(M6) obeys commands  Best Verbal Response: 1-->(V1) none  Watson Coma Scale Score: 11  Assessment Qualifiers: no eye obstruction present, patient intubated  Pupil PERRLA: yes     24 hr Temp:  [97.3 °F (36.3 °C)-98.9 °F (37.2 °C)]     CV:   Rhythm: normal sinus rhythm, sinus tachycardia  BP goals:   SBP < 160  MAP > 65    Resp:      Vent Mode: A/C  Set Rate: 12 BPM  Oxygen Concentration (%): 60  Vt Set: 320 mL  PEEP/CPAP: 5 cmH20  Pressure Support: 10 cmH20    Plan: wean to extubate    GI/:     Diet/Nutrition Received: NPO  Last Bowel Movement: 06/10/23  Voiding Characteristics: external catheter    Intake/Output Summary (Last 24 hours) at 6/11/2023 1625  Last data filed at 6/11/2023 1613  Gross per 24 hour   Intake 4358.11 ml   Output 650 ml   Net 3708.11 ml     Unmeasured Output  Urine Occurrence: 0  Stool Occurrence: 1  Emesis Occurrence: 0  Pad Count: 2    Labs/Accuchecks:  Recent Labs   Lab 06/11/23  1249   WBC 14.47*   RBC 1.41*   HGB 4.4*   HCT 14.8*         Recent Labs   Lab 06/11/23  1249      K 3.8   CO2 18*   *   BUN 23*   CREATININE 0.6   ALKPHOS 108   ALT 20   AST 32   BILITOT 3.1*      Recent Labs   Lab 06/11/23  1257   INR 2.5*   APTT >150.0*    No results for input(s): CPK, CPKMB, TROPONINI, MB in the last 168 hours.    Electrolytes: No replacement orders  Accuchecks: Q6H    Gtts:   NORepinephrine bitartrate-D5W      vasopressin 0.04 Units/min (06/11/23 1301)       LDA/Wounds:  Lines/Drains/Airways       Central Venous  Catheter Line  Duration             Trialysis (Dialysis) Catheter 06/11/23 1300 right internal jugular <1 day              Drain  Duration                  NG/OG Tube 06/01/23 2200 Right nostril 9 days         Rectal Tube 06/03/23 1300 8 days    Female External Urinary Catheter 06/10/23 1600 1 day              Airway  Duration                  Airway - Non-Surgical 06/02/23 1118 Endotracheal Tube 9 days              Arterial Line  Duration             Arterial Line 06/11/23 1300 Left Femoral <1 day              Peripheral Intravenous Line  Duration                  Midline Catheter Insertion/Assessment  - Single Lumen 05/25/23 1005 Right basilic vein (medial side of arm) other (see comments) 17 days         Peripheral IV - Single Lumen 06/09/23 1513 18 G Anterior;Distal;Left Upper Arm 2 days         Peripheral IV - Single Lumen 06/09/23 1515 20 G Anterior;Left;Proximal Forearm 2 days                  Wounds: Yes  Wound care consulted: Yes

## 2023-06-11 NOTE — ASSESSMENT & PLAN NOTE
Likely secondary to hepatic cirrhosis and development of splenomegaly  Has worsened and associated with hgb drop, transfuse to >50K  No signs of bleeding in GI tract or on CT, hemolysis on labs  Hematology consulted, tested negative for HIT    6/11 --> acute hypotension, hypoxia --> hypovolemic shock --> Large R peritoneal hematoma --> IR for class A embo

## 2023-06-11 NOTE — ASSESSMENT & PLAN NOTE
58F with EtOH induced hepatic cirrhosis, seizure disorder on outpatient LEV and ZNS and bipolar disorder admitted on 5/28 for persistent encephalopathy.    - UTI on admit (Proteus mirabilis), now s/p CTX course  - Hypercalcemia 2/2 lithium toxicity (Lithium changed to Abilify per Psych)  - Hepatic encephalopathy 2/2 to medication non compliance, treated with lactulose and rifaximin     MRI Brain WO was unremarkable for acute neurologic change  EEG w/ frequent left hemispheric electrographic seizures and NCSE  Repeat EEGs without seizure activity x 24 hours, now resolved    6/11 --> acute hypotension, hypoxia --> hypovolemic shock --> Large R peritoneal hematoma --> IR for class A embo     - Continued admission to [facility]  - Q1h neurochecks while in ICU  - Q1h vitals while in ICU  - HOB@30  - Keppra 500mg BID  - Thiamine replacement   - Lactulose, titrate to BM  - MAP>65  - Daily CMP, Mag, Phos - replete electrolytes PRN  - Lipid panel, A1c, Coags reviewed  - Daily CBC, transfuse PRN  - Heparin held in setting of acute bleed  - PT/OT/SLP as appropriate  - CM/SW consult for dispo planning

## 2023-06-11 NOTE — PLAN OF CARE
[facility] Care Plan    POC reviewed with Marely Hamilton and family at 0300. Pt unable to  verbalize understanding. Questions and concerns addressed. No acute events overnight. Pt progressing toward goals. Will continue to monitor. See below and flowsheets for full assessment and VS info.     -Plan for extubation today  -Mg replaced  -Phos replacement in place  -fent x1   -heparin gtt  -CHG bath/linen change completed          Is this a stroke patient? no    Neuro:  [place] Coma Scale  Best Eye Response: 4-->(E4) spontaneous  Best Motor Response: 6-->(M6) obeys commands  Best Verbal Response: 1-->(V1) none  [place] Coma Scale Score: 11  Assessment Qualifiers: no eye obstruction present, patient intubated  Pupil PERRLA: no (baseline)     24hr Temp:  [98 °F (36.7 °C)-98.5 °F (36.9 °C)]     CV:   Rhythm: normal sinus rhythm  BP goals:   SBP < 180  MAP > 65    Resp:      Vent Mode: SIMV  Set Rate: 10 BPM  Oxygen Concentration (%): 40  Vt Set: 320 mL  PEEP/CPAP: 5 cmH20  Pressure Support: 10 cmH20    Plan: wean to extubate    GI/:     Diet/Nutrition Received: NPO, tube feeding  Last Bowel Movement: 06/10/23  Voiding Characteristics: external catheter    Intake/Output Summary (Last 24 hours) at 6/11/2023 0311  Last data filed at 6/11/2023 0303  Gross per 24 hour   Intake 2924.81 ml   Output 1225 ml   Net 1699.81 ml     Unmeasured Output  Urine Occurrence: 0  Stool Occurrence: 1  Emesis Occurrence: 0  Pad Count: 2    Labs/Accuchecks:  Recent Labs   Lab 06/10/23  2330   WBC 7.61   RBC 2.34*   HGB 7.4*   HCT 24.4*   PLT 60*      Recent Labs   Lab 06/10/23  0228 06/10/23  1223 06/10/23  2330   *  --   --    K 2.9* 4.7  --    CO2 24  --   --    *  --   --    BUN 18  --   --    CREATININE 0.4*  --   --    ALKPHOS 172*  --  154*   ALT 23  --  25   AST 30  --  37   BILITOT 3.1*  --  3.3*      Recent Labs   Lab 06/10/23  0634 06/10/23  1223   INR  --  5.4*   APTT 71.7*  --     No results for input(s): CPK, CPELOINAB,  TROPONINI, MB in the last 168 hours.    Electrolytes: Electrolytes replaced  Accuchecks: Q6H    Gtts:   heparin (porcine) in D5W 16 Units/kg/hr (06/11/23 0303)       LDA/Wounds:  Lines/Drains/Airways       Drain  Duration                  NG/OG Tube 06/01/23 2200 Right nostril 9 days         Rectal Tube 06/03/23 1300 7 days    Female External Urinary Catheter 06/10/23 1600 <1 day              Airway  Duration                  Airway - Non-Surgical 06/02/23 1118 Endotracheal Tube 8 days              Peripheral Intravenous Line  Duration                  Midline Catheter Insertion/Assessment  - Single Lumen 05/25/23 1005 Right basilic vein (medial side of arm) other (see comments) 16 days         Peripheral IV - Single Lumen 06/09/23 1513 18 G Anterior;Distal;Left Upper Arm 1 day         Peripheral IV - Single Lumen 06/09/23 1515 20 G Anterior;Left;Proximal Forearm 1 day                  Wounds: Yes  Wound care consulted: Yes

## 2023-06-11 NOTE — ANESTHESIA PROCEDURE NOTES
Intubation    Date/Time: 6/11/2023 4:43 PM  Performed by: Monalisa Guevara CRNA  Authorized by: Roman Patterson MD     Intubation:     Intubated:  Arrived to OR intubated (ETT exchange due to cuff leak)    Mask Ventilation:  N/a    Attempts:  1    Attempted By:  CRNA and staff anesthesiologist    Method of Intubation:  Bougie and video laryngoscopy (ETT exchanged over bougie)    Blade:  Buitrago 3    Laryngeal View Grade: Grade I - full view of cords      Difficult Airway Encountered?: No      Complications:  None    Airway Device:  Oral endotracheal tube    Airway Device Size:  8.0    Style/Cuff Inflation:  Cuffed (inflated to minimal occlusive pressure)    Tube secured:  24    Secured at:  The lips    Placement Verified By:  Capnometry and Revisualization with laryngoscopy    Complicating Factors:  None    Findings Post-Intubation:  BS equal bilateral and atraumatic/condition of teeth unchanged

## 2023-06-12 LAB
ALBUMIN SERPL BCP-MCNC: 2.2 G/DL (ref 3.5–5.2)
ALBUMIN SERPL BCP-MCNC: 2.2 G/DL (ref 3.5–5.2)
ALBUMIN SERPL BCP-MCNC: 2.6 G/DL (ref 3.5–5.2)
ALLENS TEST: ABNORMAL
ALP SERPL-CCNC: 101 U/L (ref 55–135)
ALP SERPL-CCNC: 88 U/L (ref 55–135)
ALP SERPL-CCNC: 92 U/L (ref 55–135)
ALT SERPL W/O P-5'-P-CCNC: 28 U/L (ref 10–44)
ALT SERPL W/O P-5'-P-CCNC: 30 U/L (ref 10–44)
ALT SERPL W/O P-5'-P-CCNC: 31 U/L (ref 10–44)
AMMONIA PLAS-SCNC: 35 UMOL/L (ref 10–50)
ANION GAP SERPL CALC-SCNC: 9 MMOL/L (ref 8–16)
ANISOCYTOSIS BLD QL SMEAR: ABNORMAL
ANISOCYTOSIS BLD QL SMEAR: SLIGHT
ANISOCYTOSIS BLD QL SMEAR: SLIGHT
APTT PPP: 35.3 SEC (ref 21–32)
AST SERPL-CCNC: 44 U/L (ref 10–40)
AST SERPL-CCNC: 57 U/L (ref 10–40)
AST SERPL-CCNC: 58 U/L (ref 10–40)
BASO STIPL BLD QL SMEAR: ABNORMAL
BASOPHILS # BLD AUTO: 0 K/UL (ref 0–0.2)
BASOPHILS # BLD AUTO: 0.01 K/UL (ref 0–0.2)
BASOPHILS # BLD AUTO: 0.01 K/UL (ref 0–0.2)
BASOPHILS # BLD AUTO: 0.02 K/UL (ref 0–0.2)
BASOPHILS NFR BLD: 0 % (ref 0–1.9)
BASOPHILS NFR BLD: 0.1 % (ref 0–1.9)
BASOPHILS NFR BLD: 0.1 % (ref 0–1.9)
BASOPHILS NFR BLD: 0.2 % (ref 0–1.9)
BILIRUB DIRECT SERPL-MCNC: 1.3 MG/DL (ref 0.1–0.3)
BILIRUB DIRECT SERPL-MCNC: 1.6 MG/DL (ref 0.1–0.3)
BILIRUB SERPL-MCNC: 3.7 MG/DL (ref 0.1–1)
BILIRUB SERPL-MCNC: 5.1 MG/DL (ref 0.1–1)
BILIRUB SERPL-MCNC: 5.2 MG/DL (ref 0.1–1)
BLD PROD TYP BPU: NORMAL
BLOOD UNIT EXPIRATION DATE: NORMAL
BLOOD UNIT TYPE CODE: 5100
BLOOD UNIT TYPE: NORMAL
BUN SERPL-MCNC: 26 MG/DL (ref 6–20)
CALCIUM SERPL-MCNC: 8.4 MG/DL (ref 8.7–10.5)
CHLORIDE SERPL-SCNC: 114 MMOL/L (ref 95–110)
CO2 SERPL-SCNC: 20 MMOL/L (ref 23–29)
CODING SYSTEM: NORMAL
COPPER URINE COLLECTION DURATION: 24 H
COPPER URINE VOLUME: 1350 ML
COPPER, 24 HOUR URINE: 33 MCG/24 H (ref 9–71)
CREAT SERPL-MCNC: 0.5 MG/DL (ref 0.5–1.4)
CROSSMATCH INTERPRETATION: NORMAL
DELSYS: ABNORMAL
DIFFERENTIAL METHOD: ABNORMAL
DISPENSE STATUS: NORMAL
DOHLE BOD BLD QL SMEAR: PRESENT
EOSINOPHIL # BLD AUTO: 0 K/UL (ref 0–0.5)
EOSINOPHIL NFR BLD: 0 % (ref 0–8)
EOSINOPHIL NFR BLD: 0.1 % (ref 0–8)
EOSINOPHIL NFR BLD: 0.1 % (ref 0–8)
EOSINOPHIL NFR BLD: 0.4 % (ref 0–8)
ERYTHROCYTE [DISTWIDTH] IN BLOOD BY AUTOMATED COUNT: 16.3 % (ref 11.5–14.5)
ERYTHROCYTE [DISTWIDTH] IN BLOOD BY AUTOMATED COUNT: 17.3 % (ref 11.5–14.5)
ERYTHROCYTE [DISTWIDTH] IN BLOOD BY AUTOMATED COUNT: 17.4 % (ref 11.5–14.5)
ERYTHROCYTE [DISTWIDTH] IN BLOOD BY AUTOMATED COUNT: 17.4 % (ref 11.5–14.5)
ERYTHROCYTE [SEDIMENTATION RATE] IN BLOOD BY WESTERGREN METHOD: 16 MM/H
EST. GFR  (NO RACE VARIABLE): >60 ML/MIN/1.73 M^2
FIBRINOGEN PPP-MCNC: 129 MG/DL (ref 182–400)
FIBRINOGEN PPP-MCNC: 130 MG/DL (ref 182–400)
FIBRINOGEN PPP-MCNC: 132 MG/DL (ref 182–400)
FIBRINOGEN PPP-MCNC: 139 MG/DL (ref 182–400)
FIO2: 100
GLUCOSE SERPL-MCNC: 132 MG/DL (ref 70–110)
GLUCOSE SERPL-MCNC: 153 MG/DL (ref 70–110)
HCO3 UR-SCNC: 19.9 MMOL/L (ref 24–28)
HCO3 UR-SCNC: 20.7 MMOL/L (ref 24–28)
HCT VFR BLD AUTO: 18.3 % (ref 37–48.5)
HCT VFR BLD AUTO: 21.2 % (ref 37–48.5)
HCT VFR BLD AUTO: 21.5 % (ref 37–48.5)
HCT VFR BLD AUTO: 21.6 % (ref 37–48.5)
HCT VFR BLD CALC: 18 %PCV (ref 36–54)
HGB BLD-MCNC: 6.1 G/DL (ref 12–16)
HGB BLD-MCNC: 7.1 G/DL (ref 12–16)
HGB BLD-MCNC: 7.1 G/DL (ref 12–16)
HGB BLD-MCNC: 7.2 G/DL (ref 12–16)
HYPOCHROMIA BLD QL SMEAR: ABNORMAL
HYPOCHROMIA BLD QL SMEAR: ABNORMAL
IMM GRANULOCYTES # BLD AUTO: 0.02 K/UL (ref 0–0.04)
IMM GRANULOCYTES # BLD AUTO: 0.04 K/UL (ref 0–0.04)
IMM GRANULOCYTES # BLD AUTO: 0.05 K/UL (ref 0–0.04)
IMM GRANULOCYTES # BLD AUTO: 0.05 K/UL (ref 0–0.04)
IMM GRANULOCYTES NFR BLD AUTO: 0.2 % (ref 0–0.5)
IMM GRANULOCYTES NFR BLD AUTO: 0.4 % (ref 0–0.5)
IMM GRANULOCYTES NFR BLD AUTO: 0.6 % (ref 0–0.5)
IMM GRANULOCYTES NFR BLD AUTO: 0.7 % (ref 0–0.5)
INR PPP: 1.8 (ref 0.8–1.2)
INR PPP: 1.8 (ref 0.8–1.2)
INR PPP: 1.9 (ref 0.8–1.2)
INR PPP: 1.9 (ref 0.8–1.2)
LYMPHOCYTES # BLD AUTO: 1 K/UL (ref 1–4.8)
LYMPHOCYTES # BLD AUTO: 1.1 K/UL (ref 1–4.8)
LYMPHOCYTES # BLD AUTO: 1.2 K/UL (ref 1–4.8)
LYMPHOCYTES # BLD AUTO: 1.3 K/UL (ref 1–4.8)
LYMPHOCYTES NFR BLD: 13 % (ref 18–48)
LYMPHOCYTES NFR BLD: 13.2 % (ref 18–48)
LYMPHOCYTES NFR BLD: 13.9 % (ref 18–48)
LYMPHOCYTES NFR BLD: 14.4 % (ref 18–48)
MAGNESIUM SERPL-MCNC: 1.7 MG/DL (ref 1.6–2.6)
MCH RBC QN AUTO: 29.6 PG (ref 27–31)
MCH RBC QN AUTO: 30.3 PG (ref 27–31)
MCH RBC QN AUTO: 30.3 PG (ref 27–31)
MCH RBC QN AUTO: 30.7 PG (ref 27–31)
MCHC RBC AUTO-ENTMCNC: 32.9 G/DL (ref 32–36)
MCHC RBC AUTO-ENTMCNC: 33 G/DL (ref 32–36)
MCHC RBC AUTO-ENTMCNC: 33.3 G/DL (ref 32–36)
MCHC RBC AUTO-ENTMCNC: 34 G/DL (ref 32–36)
MCV RBC AUTO: 87 FL (ref 82–98)
MCV RBC AUTO: 91 FL (ref 82–98)
MCV RBC AUTO: 92 FL (ref 82–98)
MCV RBC AUTO: 94 FL (ref 82–98)
MODE: ABNORMAL
MONOCYTES # BLD AUTO: 0.6 K/UL (ref 0.3–1)
MONOCYTES # BLD AUTO: 0.8 K/UL (ref 0.3–1)
MONOCYTES # BLD AUTO: 0.8 K/UL (ref 0.3–1)
MONOCYTES # BLD AUTO: 0.9 K/UL (ref 0.3–1)
MONOCYTES NFR BLD: 10.5 % (ref 4–15)
MONOCYTES NFR BLD: 7.1 % (ref 4–15)
MONOCYTES NFR BLD: 9.3 % (ref 4–15)
MONOCYTES NFR BLD: 9.9 % (ref 4–15)
NEUTROPHILS # BLD AUTO: 5.7 K/UL (ref 1.8–7.7)
NEUTROPHILS # BLD AUTO: 6 K/UL (ref 1.8–7.7)
NEUTROPHILS # BLD AUTO: 6.3 K/UL (ref 1.8–7.7)
NEUTROPHILS # BLD AUTO: 7.1 K/UL (ref 1.8–7.7)
NEUTROPHILS NFR BLD: 75 % (ref 38–73)
NEUTROPHILS NFR BLD: 75.3 % (ref 38–73)
NEUTROPHILS NFR BLD: 76.2 % (ref 38–73)
NEUTROPHILS NFR BLD: 79.3 % (ref 38–73)
NRBC BLD-RTO: 0 /100 WBC
OVALOCYTES BLD QL SMEAR: ABNORMAL
OVALOCYTES BLD QL SMEAR: ABNORMAL
PCO2 BLDA: 31.8 MMHG (ref 35–45)
PCO2 BLDA: 55.5 MMHG (ref 35–45)
PEEP: 8
PH SMN: 7.16 [PH] (ref 7.35–7.45)
PH SMN: 7.42 [PH] (ref 7.35–7.45)
PHOSPHATE SERPL-MCNC: 3.1 MG/DL (ref 2.7–4.5)
PLATELET # BLD AUTO: 69 K/UL (ref 150–450)
PLATELET # BLD AUTO: 70 K/UL (ref 150–450)
PLATELET # BLD AUTO: 92 K/UL (ref 150–450)
PLATELET # BLD AUTO: 93 K/UL (ref 150–450)
PLATELET BLD QL SMEAR: ABNORMAL
PMV BLD AUTO: 10.2 FL (ref 9.2–12.9)
PMV BLD AUTO: 10.3 FL (ref 9.2–12.9)
PMV BLD AUTO: 10.3 FL (ref 9.2–12.9)
PMV BLD AUTO: 10.7 FL (ref 9.2–12.9)
PO2 BLDA: 57 MMHG (ref 80–100)
PO2 BLDA: 76 MMHG (ref 80–100)
POC BE: -4 MMOL/L
POC BE: -9 MMOL/L
POC IONIZED CALCIUM: 1.32 MMOL/L (ref 1.06–1.42)
POC SATURATED O2: 80 % (ref 95–100)
POC SATURATED O2: 96 % (ref 95–100)
POC TCO2: 22 MMOL/L (ref 23–27)
POC TCO2: 22 MMOL/L (ref 23–27)
POCT GLUCOSE: 156 MG/DL (ref 70–110)
POCT GLUCOSE: 158 MG/DL (ref 70–110)
POCT GLUCOSE: 172 MG/DL (ref 70–110)
POIKILOCYTOSIS BLD QL SMEAR: SLIGHT
POIKILOCYTOSIS BLD QL SMEAR: SLIGHT
POLYCHROMASIA BLD QL SMEAR: ABNORMAL
POTASSIUM BLD-SCNC: 3.6 MMOL/L (ref 3.5–5.1)
POTASSIUM SERPL-SCNC: 3.6 MMOL/L (ref 3.5–5.1)
PROT SERPL-MCNC: 3.8 G/DL (ref 6–8.4)
PROT SERPL-MCNC: 3.9 G/DL (ref 6–8.4)
PROT SERPL-MCNC: 4.1 G/DL (ref 6–8.4)
PROTHROMBIN TIME: 18.6 SEC (ref 9–12.5)
PROTHROMBIN TIME: 18.8 SEC (ref 9–12.5)
PROTHROMBIN TIME: 19.7 SEC (ref 9–12.5)
PROTHROMBIN TIME: 19.7 SEC (ref 9–12.5)
RBC # BLD AUTO: 2.01 M/UL (ref 4–5.4)
RBC # BLD AUTO: 2.31 M/UL (ref 4–5.4)
RBC # BLD AUTO: 2.34 M/UL (ref 4–5.4)
RBC # BLD AUTO: 2.43 M/UL (ref 4–5.4)
SAMPLE: ABNORMAL
SAMPLE: ABNORMAL
SITE: ABNORMAL
SODIUM BLD-SCNC: 144 MMOL/L (ref 136–145)
SODIUM SERPL-SCNC: 143 MMOL/L (ref 136–145)
SP02: 94
SPHEROCYTES BLD QL SMEAR: ABNORMAL
SPHEROCYTES BLD QL SMEAR: ABNORMAL
TARGETS BLD QL SMEAR: ABNORMAL
TOXIC GRANULES BLD QL SMEAR: PRESENT
TRANS ERYTHROCYTES VOL PATIENT: NORMAL ML
VT: 340
WBC # BLD AUTO: 7.62 K/UL (ref 3.9–12.7)
WBC # BLD AUTO: 7.97 K/UL (ref 3.9–12.7)
WBC # BLD AUTO: 8.01 K/UL (ref 3.9–12.7)
WBC # BLD AUTO: 9.33 K/UL (ref 3.9–12.7)

## 2023-06-12 PROCEDURE — 99900026 HC AIRWAY MAINTENANCE (STAT)

## 2023-06-12 PROCEDURE — P9047 ALBUMIN (HUMAN), 25%, 50ML: HCPCS | Mod: JZ,JG | Performed by: STUDENT IN AN ORGANIZED HEALTH CARE EDUCATION/TRAINING PROGRAM

## 2023-06-12 PROCEDURE — 85730 THROMBOPLASTIN TIME PARTIAL: CPT | Performed by: PSYCHIATRY & NEUROLOGY

## 2023-06-12 PROCEDURE — 99291 CRITICAL CARE FIRST HOUR: CPT | Mod: GC,,, | Performed by: PSYCHIATRY & NEUROLOGY

## 2023-06-12 PROCEDURE — 27000221 HC OXYGEN, UP TO 24 HOURS

## 2023-06-12 PROCEDURE — 84100 ASSAY OF PHOSPHORUS: CPT | Performed by: PSYCHIATRY & NEUROLOGY

## 2023-06-12 PROCEDURE — 85025 COMPLETE CBC W/AUTO DIFF WBC: CPT | Mod: 91 | Performed by: PSYCHIATRY & NEUROLOGY

## 2023-06-12 PROCEDURE — 85610 PROTHROMBIN TIME: CPT | Mod: 91 | Performed by: PSYCHIATRY & NEUROLOGY

## 2023-06-12 PROCEDURE — 99900035 HC TECH TIME PER 15 MIN (STAT)

## 2023-06-12 PROCEDURE — 63600175 PHARM REV CODE 636 W HCPCS: Performed by: STUDENT IN AN ORGANIZED HEALTH CARE EDUCATION/TRAINING PROGRAM

## 2023-06-12 PROCEDURE — 25000003 PHARM REV CODE 250

## 2023-06-12 PROCEDURE — 37799 UNLISTED PX VASCULAR SURGERY: CPT

## 2023-06-12 PROCEDURE — C9113 INJ PANTOPRAZOLE SODIUM, VIA: HCPCS | Performed by: STUDENT IN AN ORGANIZED HEALTH CARE EDUCATION/TRAINING PROGRAM

## 2023-06-12 PROCEDURE — 99222 PR INITIAL HOSPITAL CARE,LEVL II: ICD-10-PCS | Mod: ,,, | Performed by: INTERNAL MEDICINE

## 2023-06-12 PROCEDURE — P9021 RED BLOOD CELLS UNIT: HCPCS | Performed by: STUDENT IN AN ORGANIZED HEALTH CARE EDUCATION/TRAINING PROGRAM

## 2023-06-12 PROCEDURE — 85384 FIBRINOGEN ACTIVITY: CPT | Performed by: PSYCHIATRY & NEUROLOGY

## 2023-06-12 PROCEDURE — 99291 PR CRITICAL CARE, E/M 30-74 MINUTES: ICD-10-PCS | Mod: GC,,, | Performed by: PSYCHIATRY & NEUROLOGY

## 2023-06-12 PROCEDURE — 80053 COMPREHEN METABOLIC PANEL: CPT | Performed by: PHYSICIAN ASSISTANT

## 2023-06-12 PROCEDURE — 94003 VENT MGMT INPAT SUBQ DAY: CPT

## 2023-06-12 PROCEDURE — 25000003 PHARM REV CODE 250: Performed by: PHYSICIAN ASSISTANT

## 2023-06-12 PROCEDURE — 80076 HEPATIC FUNCTION PANEL: CPT | Performed by: NURSE PRACTITIONER

## 2023-06-12 PROCEDURE — 94761 N-INVAS EAR/PLS OXIMETRY MLT: CPT

## 2023-06-12 PROCEDURE — 20000000 HC ICU ROOM

## 2023-06-12 PROCEDURE — 36430 TRANSFUSION BLD/BLD COMPNT: CPT

## 2023-06-12 PROCEDURE — 83735 ASSAY OF MAGNESIUM: CPT | Performed by: PSYCHIATRY & NEUROLOGY

## 2023-06-12 PROCEDURE — 27200966 HC CLOSED SUCTION SYSTEM

## 2023-06-12 PROCEDURE — 82803 BLOOD GASES ANY COMBINATION: CPT

## 2023-06-12 PROCEDURE — 99222 1ST HOSP IP/OBS MODERATE 55: CPT | Mod: ,,, | Performed by: INTERNAL MEDICINE

## 2023-06-12 PROCEDURE — 85610 PROTHROMBIN TIME: CPT | Mod: 91 | Performed by: STUDENT IN AN ORGANIZED HEALTH CARE EDUCATION/TRAINING PROGRAM

## 2023-06-12 PROCEDURE — 25000003 PHARM REV CODE 250: Performed by: STUDENT IN AN ORGANIZED HEALTH CARE EDUCATION/TRAINING PROGRAM

## 2023-06-12 PROCEDURE — 80076 HEPATIC FUNCTION PANEL: CPT | Mod: 91 | Performed by: NURSE PRACTITIONER

## 2023-06-12 PROCEDURE — 82140 ASSAY OF AMMONIA: CPT | Performed by: NURSE PRACTITIONER

## 2023-06-12 PROCEDURE — 85025 COMPLETE CBC W/AUTO DIFF WBC: CPT | Mod: 91 | Performed by: PHYSICIAN ASSISTANT

## 2023-06-12 PROCEDURE — 51798 US URINE CAPACITY MEASURE: CPT

## 2023-06-12 PROCEDURE — 85384 FIBRINOGEN ACTIVITY: CPT | Mod: 91 | Performed by: STUDENT IN AN ORGANIZED HEALTH CARE EDUCATION/TRAINING PROGRAM

## 2023-06-12 RX ORDER — ALBUMIN HUMAN 250 G/1000ML
25 SOLUTION INTRAVENOUS ONCE
Status: COMPLETED | OUTPATIENT
Start: 2023-06-12 | End: 2023-06-12

## 2023-06-12 RX ORDER — FENTANYL CITRATE-0.9 % NACL/PF 10 MCG/ML
0-100 PLASTIC BAG, INJECTION (ML) INTRAVENOUS CONTINUOUS
Status: DISCONTINUED | OUTPATIENT
Start: 2023-06-12 | End: 2023-06-16

## 2023-06-12 RX ORDER — FUROSEMIDE 10 MG/ML
40 INJECTION INTRAMUSCULAR; INTRAVENOUS ONCE
Status: COMPLETED | OUTPATIENT
Start: 2023-06-12 | End: 2023-06-12

## 2023-06-12 RX ORDER — HYDROCODONE BITARTRATE AND ACETAMINOPHEN 500; 5 MG/1; MG/1
TABLET ORAL
Status: DISCONTINUED | OUTPATIENT
Start: 2023-06-12 | End: 2023-06-13

## 2023-06-12 RX ORDER — NOREPINEPHRINE BITARTRATE/D5W 4MG/250ML
0-3 PLASTIC BAG, INJECTION (ML) INTRAVENOUS CONTINUOUS
Status: DISCONTINUED | OUTPATIENT
Start: 2023-06-12 | End: 2023-06-12

## 2023-06-12 RX ADMIN — INSULIN ASPART 2 UNITS: 100 INJECTION, SOLUTION INTRAVENOUS; SUBCUTANEOUS at 11:06

## 2023-06-12 RX ADMIN — ALBUMIN HUMAN 25 G: 0.25 SOLUTION INTRAVENOUS at 10:06

## 2023-06-12 RX ADMIN — Medication 100 MG: at 08:06

## 2023-06-12 RX ADMIN — ARIPIPRAZOLE 5 MG: 5 TABLET ORAL at 08:06

## 2023-06-12 RX ADMIN — PANTOPRAZOLE SODIUM 40 MG: 40 INJECTION, POWDER, FOR SOLUTION INTRAVENOUS at 08:06

## 2023-06-12 RX ADMIN — FUROSEMIDE 40 MG: 10 INJECTION, SOLUTION INTRAMUSCULAR; INTRAVENOUS at 10:06

## 2023-06-12 RX ADMIN — INSULIN ASPART 2 UNITS: 100 INJECTION, SOLUTION INTRAVENOUS; SUBCUTANEOUS at 05:06

## 2023-06-12 RX ADMIN — FOLIC ACID 1 MG: 1 TABLET ORAL at 08:06

## 2023-06-12 RX ADMIN — Medication 25 MCG/HR: at 04:06

## 2023-06-12 RX ADMIN — SODIUM CHLORIDE 250 ML: 0.9 INJECTION, SOLUTION INTRAVENOUS at 05:06

## 2023-06-12 RX ADMIN — RIFAXIMIN 550 MG: 550 TABLET ORAL at 08:06

## 2023-06-12 RX ADMIN — POTASSIUM BICARBONATE 50 MEQ: 978 TABLET, EFFERVESCENT ORAL at 01:06

## 2023-06-12 RX ADMIN — SODIUM CHLORIDE 500 ML: 0.9 INJECTION, SOLUTION INTRAVENOUS at 04:06

## 2023-06-12 RX ADMIN — LEVETIRACETAM 500 MG: 500 SOLUTION ORAL at 08:06

## 2023-06-12 NOTE — PLAN OF CARE
[facility] Care Plan    POC reviewed with Marely Hamilton and family at 1400. Pt nods understanding. Questions and concerns addressed. No acute events today. Pt progressing toward goals. Will continue to monitor. See below and flowsheets for full assessment and VS info.     - TF continued.  - Q4hr Neuro checks.   - MAPs > 55.  - Saldaña inserted.   - Water Boluses (150 cc)   - 1 unit of PRBC given for critical H&H.         Is this a stroke patient? no    Neuro:  [place] Coma Scale  Best Eye Response: 4-->(E4) spontaneous  Best Motor Response: 6-->(M6) obeys commands  Best Verbal Response: 1-->(V1) none  [place] Coma Scale Score: 11  Assessment Qualifiers: no eye obstruction present, patient intubated  Pupil PERRLA: yes     24 hr Temp:  [97.5 °F (36.4 °C)-98.9 °F (37.2 °C)]     CV:   Rhythm: normal sinus rhythm, sinus tachycardia  BP goals:   SBP < 160  MAP > 65    Resp:      Vent Mode: A/C  Set Rate: 16 BPM  Oxygen Concentration (%): 100  Vt Set: 340 mL  PEEP/CPAP: 8 cmH20  Pressure Support: 10 cmH20    Plan: wean to extubate    GI/:     Diet/Nutrition Received: NPO, tube feeding  Last Bowel Movement: 06/12/23  Voiding Characteristics: external catheter    Intake/Output Summary (Last 24 hours) at 6/12/2023 1554  Last data filed at 6/12/2023 1501  Gross per 24 hour   Intake 2603.28 ml   Output 825 ml   Net 1778.28 ml     Unmeasured Output  Urine Occurrence: 1  Stool Occurrence: 1  Emesis Occurrence: 0  Pad Count: 1    Labs/Accuchecks:  Recent Labs   Lab 06/12/23  1143   WBC 7.97   RBC 2.01*   HGB 6.1*   HCT 18.3*   PLT 70*      Recent Labs   Lab 06/12/23  0054      K 3.6   CO2 20*   *   BUN 26*   CREATININE 0.5   ALKPHOS 88  92   ALT 31  30   AST 57*  58*   BILITOT 5.1*  5.2*      Recent Labs   Lab 06/11/23  1758 06/12/23  1016 06/12/23  1143   INR 1.5*   < > 1.9*   APTT 33.3*  --   --     < > = values in this interval not displayed.    No results for input(s): CPK, CPKMB, TROPONINI, MB in the last 168  hours.    Electrolytes: N/A - electrolytes WDL  Accuchecks: Q6H    Gtts:      LDA/Wounds:  Lines/Drains/Airways       Central Venous Catheter Line  Duration             Trialysis (Dialysis) Catheter 06/11/23 1300 right internal jugular 1 day              Drain  Duration                  NG/OG Tube 06/01/23 2200 Right nostril 10 days         Rectal Tube 06/03/23 1300 9 days         Urethral Catheter 06/12/23 1405 <1 day              Airway  Duration                  Airway - Non-Surgical 06/11/23 1643 <1 day              Arterial Line  Duration             Arterial Line 06/11/23 1300 Left Femoral 1 day              Peripheral Intravenous Line  Duration                  Midline Catheter Insertion/Assessment  - Single Lumen 05/25/23 1005 Right basilic vein (medial side of arm) other (see comments) 18 days         Peripheral IV - Single Lumen 06/09/23 1513 18 G Anterior;Distal;Left Upper Arm 3 days         Peripheral IV - Single Lumen 06/09/23 1515 20 G Anterior;Left;Proximal Forearm 3 days                  Wounds: Yes  Wound care consulted: Yes

## 2023-06-12 NOTE — ASSESSMENT & PLAN NOTE
Hypotensive 2/2 hypovolemic/hemorrhagic shock on 6/11  CTA with large retroperitoneal hematoma, s/p IR embolization of left lumbar and internal iliac branch foci of arterial extravasationn

## 2023-06-12 NOTE — ASSESSMENT & PLAN NOTE
Noted on 6/8  Argatroban per Heme recs - transitioned to heparin  HELD 6/11 in setting of hypovolemic shock 2/2 retroperitoneal hemorrhage   ?IVC filter

## 2023-06-12 NOTE — PLAN OF CARE
[facility] plan of care     58 year old female with hx of liver cirrhosis, epilepsy who was admitted to [facility] for status epilepticus. [facility] hematology team consulted initially for thrombocytopenia and coagulopathy.     Workup:   B12/folate negative for deficiency   Iron studies 5/18 negative for deficiency   Retic elevated is elevated  Haptoglobin is low which could be related to hemolysis or liver disease   Smear: Red cells appear predominantly normochromic and normocytic with moderate anisocytosis and mild polychromasia. The polychromasia could contribute to the mildly increased MCV.     There is also mild to moderate poikilocytosis with scattered   elliptocytes and rare red cell fragments.  Target cells are also   seen.  White cells show a predominance of segmented neutrophils with mild toxic granulation, favor reactive.     Platelets are morphologically unremarkable if decreased in number by automated count.  No significant platelet clumping is seen on   scanning.   Factor 8 level elevated at 179  US Venous 6/8: Nonocclusive thrombus in the right proximal femoral vein  HIT panel is negative. MANUEL is pending.     Assessment:     Overall picture consistent with liver disease related coagulopathy. Heparin inducted thrombocytopenia was part of the differential, 4T score was moderate, US venous LE study showed acute DVT, patient was started on anticoagulation. Unfortunately patient developed retroperitoneal hematoma s/p IR emobolization of left lumbar and internal iliac branches. Received supportive care with PRBC, cryo and FFP and vitamin 10 mg IV (6/11)    Recommendations:  Continue supportive care  Monitor cbc,inr, ptt, fibrinogen daily and transfuse for hemoglobin < 7g/dl, platelet < 50k with active bleeding, fibrinogen < 100   Patient is currently not a candidate for continued anticoagulation due to bleeding, recommend IVC filter placement   [facility] hematology team will sign off, please don't  hesitate to contact us with any questions or concerns    Carlitos Shetty MD  Hematology and Medical Oncology fellow

## 2023-06-12 NOTE — PLAN OF CARE
[facility] Critical Care  Discharge Reassessment    Primary Care Provider: Viktor Ross MD    Expected Discharge Date: 6/21/2023    Patient intubated on vent.  Not medically stable for discharge.      Reassessment (most recent)       Discharge Reassessment - 06/12/23 1655          Discharge Reassessment    Assessment Type Discharge Planning Reassessment     Did the patient's condition or plan change since previous assessment? No     Communicated BRAYAN with patient/caregiver Date not available/Unable to determine     Discharge Plan A Long-term acute care facility (LTAC)     Discharge Plan B Rehab     DME Needed Upon Discharge  none     Transition of Care Barriers None     Why the patient remains in the hospital Requires continued medical care                     Dixie Faith RN, CCRN-K, [facility]  Neuro-Critical Care   X [phone]

## 2023-06-12 NOTE — ASSESSMENT & PLAN NOTE
6/11 --> acute hypotension, hypoxia --> hypovolemic shock --> Large R peritoneal hematoma --> IR for class A embo   s/p IR embolization of left lumbar and internal iliac branch foci of arterial extravasation

## 2023-06-12 NOTE — ASSESSMENT & PLAN NOTE
58F with EtOH induced hepatic cirrhosis, seizure disorder on outpatient LEV and ZNS and bipolar disorder admitted on 5/28 for persistent encephalopathy.    - UTI on admit (Proteus mirabilis), now s/p CTX course  - Hypercalcemia 2/2 lithium toxicity (Lithium changed to Abilify per Psych)  - Hepatic encephalopathy 2/2 to medication non compliance, treated with lactulose and rifaximin     MRI Brain WO was unremarkable for acute neurologic change  EEG w/ frequent left hemispheric electrographic seizures and NCSE  Repeat EEGs without seizure activity x 24 hours, now resolved    6/11 --> acute hypotension, hypoxia --> hypovolemic shock --> Large R peritoneal hematoma --> IR for class A embo --> s/p IR embolization of left lumbar and internal iliac branch foci of arterial extravasation    - Continued admission to [facility]  - Q1h neurochecks while in ICU  - Q1h vitals while in ICU  - HOB@30  - Keppra 500mg BID  - Thiamine replacement   - Lactulose, titrate to BM  - MAP>65  - Daily CMP, Mag, Phos - replete electrolytes PRN  - Lipid panel, A1c, Coags reviewed  - Q8 CBC, transfuse PRN  - BID fibrinogen/PT-INR  - Heparin held in setting of acute bleed  - PT/OT/SLP as appropriate  - CM/SW consult for dispo planning

## 2023-06-12 NOTE — PLAN OF CARE
[facility] Care Plan    POC reviewed with Marely Hamilton and family at 0300. Pt unable to verbalize understanding due to ett. Questions and concerns addressed with family. No acute events overnight. Pt progressing toward goals. Will continue to monitor. See below and flowsheets for full assessment and VS info.     -trending CBC q6  -Trickle feed          Is this a stroke patient? no    Neuro:  [place] Coma Scale  Best Eye Response: 4-->(E4) spontaneous  Best Motor Response: 6-->(M6) obeys commands  Best Verbal Response: 1-->(V1) none  [place] Coma Scale Score: 11  Assessment Qualifiers: no eye obstruction present, patient intubated  Pupil PERRLA: no (baseline)     24hr Temp:  [97.3 °F (36.3 °C)-98.9 °F (37.2 °C)]     CV:   Rhythm: normal sinus rhythm  BP goals:   SBP < 180  MAP > 65    Resp:      Vent Mode: A/C  Set Rate: 16 BPM  Oxygen Concentration (%): 100  Vt Set: 340 mL  PEEP/CPAP: 8 cmH20  Pressure Support: 10 cmH20    Plan: wean to extubate    GI/:     Diet/Nutrition Received: NPO, tube feeding  Last Bowel Movement: 06/10/23  Voiding Characteristics: external catheter    Intake/Output Summary (Last 24 hours) at 6/12/2023 0343  Last data filed at 6/12/2023 0102  Gross per 24 hour   Intake 4225.55 ml   Output 725 ml   Net 3500.55 ml     Unmeasured Output  Urine Occurrence: 1  Stool Occurrence: 1  Emesis Occurrence: 0  Pad Count: 1    Labs/Accuchecks:  Recent Labs   Lab 06/12/23  0054   WBC 8.01   RBC 2.31*   HGB 7.1*   HCT 21.6*   PLT 92*      Recent Labs   Lab 06/12/23  0054      K 3.6   CO2 20*   *   BUN 26*   CREATININE 0.5   ALKPHOS 88  92   ALT 31  30   AST 57*  58*   BILITOT 5.1*  5.2*      Recent Labs   Lab 06/11/23  1758   INR 1.5*   APTT 33.3*    No results for input(s): CPK, CPKMB, TROPONINI, MB in the last 168 hours.    Electrolytes: Electrolytes replaced  Accuchecks: Q6H    Gtts:      LDA/Wounds:  Lines/Drains/Airways       Central Venous Catheter Line  Duration             Trialysis  (Dialysis) Catheter 06/11/23 1300 right internal jugular <1 day              Drain  Duration                  NG/OG Tube 06/01/23 2200 Right nostril 10 days         Rectal Tube 06/03/23 1300 8 days    Female External Urinary Catheter 06/10/23 1600 1 day              Airway  Duration                  Airway - Non-Surgical 06/11/23 1643 <1 day              Arterial Line  Duration             Arterial Line 06/11/23 1300 Left Femoral <1 day              Peripheral Intravenous Line  Duration                  Midline Catheter Insertion/Assessment  - Single Lumen 05/25/23 1005 Right basilic vein (medial side of arm) other (see comments) 17 days         Peripheral IV - Single Lumen 06/09/23 1513 18 G Anterior;Distal;Left Upper Arm 2 days         Peripheral IV - Single Lumen 06/09/23 1515 20 G Anterior;Left;Proximal Forearm 2 days                  Wounds: Yes  Wound care consulted: Yes

## 2023-06-12 NOTE — SUBJECTIVE & OBJECTIVE
Interval History:  As above    Review of Systems   Unable to perform ROS: Intubated     Objective:     Vitals:  Temp: 97.9 °F (36.6 °C)  Pulse: 64  Rhythm: normal sinus rhythm, sinus tachycardia  BP: (!) 95/54  MAP (mmHg): 72  Resp: 16  SpO2: (!) 94 %  Oxygen Concentration (%): 100  Vent Mode: A/C  Set Rate: 16 BPM  Vt Set: 340 mL  PEEP/CPAP: 8 cmH20  Peak Airway Pressure: 52 cmH20  Mean Airway Pressure: 15 cmH20  Plateau Pressure: 0 cmH20    Temp  Min: 97.5 °F (36.4 °C)  Max: 98.9 °F (37.2 °C)  Pulse  Min: 64  Max: 151  BP  Min: 60/33  Max: 158/81  MAP (mmHg)  Min: 41  Max: 110  Resp  Min: 14  Max: 37  SpO2  Min: 78 %  Max: 99 %  Oxygen Concentration (%)  Min: 40  Max: 100    06/11 0701 - 06/12 0700  In: 4706.4 [I.V.:423.1]  Out: 375 [Urine:300]   Unmeasured Output  Urine Occurrence: 1  Stool Occurrence: 1  Emesis Occurrence: 0  Pad Count: 1        Physical Exam  Vitals and nursing note reviewed.   Constitutional:       General: She is not in acute distress.     Appearance: She is ill-appearing.      Comments: Opens eye spontaneously, following commands   HENT:      Head: Normocephalic and atraumatic.   Eyes:      General: Scleral icterus present.      Extraocular Movements: Extraocular movements intact.      Pupils: Pupils are equal, round, and reactive to light.   Cardiovascular:      Rate and Rhythm: Normal rate.   Pulmonary:      Comments:   Intubated and mechanically ventilated, coarse breath sounds throughout.   Abdominal:      Palpations: Abdomen is distended.   Musculoskeletal:      Cervical back: Neck supple.   Skin:     General: Skin is warm.   Neurological:      Mental Status: She is alert.      Comments:   Alert  Intubated, unable to test orientation  Follows commands x4  Pupils equal, reactive  Face grossly symmetric   Moves all extremities antigravity and to commands    Medications:  Continuous   Scheduledalbumin human 25%, 25 g, Once  ARIPiprazole, 5 mg, Daily  folic acid, 1 mg, Daily  furosemide  (LASIX) injection, 40 mg, Once  levetiracetam, 500 mg, BID  pantoprazole, 40 mg, BID  rifAXIMin, 550 mg, BID  thiamine, 100 mg, Daily    PRNsodium chloride, , Q24H PRN  sodium chloride, , Q24H PRN  sodium chloride, , Q24H PRN  sodium chloride, , Q24H PRN  sodium chloride, , Q24H PRN  sodium chloride, , Q24H PRN  sodium chloride, , Q24H PRN  sodium chloride, , Q24H PRN  sodium chloride, , Q24H PRN  sodium chloride, , Q24H PRN  aluminum-magnesium hydroxide-simethicone, 30 mL, QID PRN  dextrose 10%, 12.5 g, PRN  dextrose 10%, 25 g, PRN  dextrose, 15 g, PRN  dextrose, 30 g, PRN  fentaNYL, 50 mcg, Q1H PRN  glucagon (human recombinant), 1 mg, PRN  insulin aspart U-100, 1-10 Units, Q6H PRN  labetalol, 10 mg, Q4H PRN  magnesium oxide, 800 mg, PRN  magnesium oxide, 800 mg, PRN  melatonin, 6 mg, Nightly PRN  naloxone, 0.02 mg, PRN  ondansetron, 4 mg, Q8H PRN  potassium bicarbonate, 35 mEq, PRN  potassium bicarbonate, 50 mEq, PRN  potassium bicarbonate, 60 mEq, PRN  potassium, sodium phosphates, 2 packet, PRN  potassium, sodium phosphates, 2 packet, PRN  potassium, sodium phosphates, 2 packet, PRN  simethicone, 1 tablet, QID PRN  sodium chloride 0.9%, 10 mL, Q8H PRN      Today I personally reviewed pertinent medications, imaging, laboratory results, notably: n    Hemoglobin 4.4  CTA Abd/Pelvis with large retroperitoneal hematoma    Diet  Diet NPO  Diet NPO

## 2023-06-12 NOTE — ASSESSMENT & PLAN NOTE
Noted on 6/8  Argatroban per Heme recs - transitioned to heparin 6/10  HELD 6/11 in setting of retroperitoneal bleed  ?IVC filter

## 2023-06-12 NOTE — ASSESSMENT & PLAN NOTE
6/11 --> acute hypotension, hypoxia --> hypovolemic shock --> Large R peritoneal hematoma --> IR for class A embo --> s/p IR embolization of left lumbar and internal iliac branch foci of arterial extravasation

## 2023-06-12 NOTE — NURSING
Lab called with critical lab value (H&H). MD Vicenta notified and to bedside. 1 unit of RBCs ordered.  WCTM.

## 2023-06-12 NOTE — PROGRESS NOTES
[facility] - Neuro Critical Care  Neurocritical Care  Progress Note    Admit Date: 5/28/2023  Service Date: 06/12/2023  Length of Stay: 15    Subjective:     Chief Complaint: Non-convulsive status epilepticus    History of Present Illness: Marely Hamilton is a 58 year old female with a medical history significant for EtOH induced hepatic cirrhosis, seizure disorder on outpatient LEV and ZNS and bipolar disorder who [place] [facility] [place] a transfer from OSH for evaluation of persistent encephalopathy concerning for NCSE vs hepatic encephalopathy. History is obtained from chart as patient is unresponsive and unable to assist. Initially presented to [facility] on 5/18 for encephalopathy and thought to be multifactorial including UTI (Proteus mirabilis s/p CTX course), hypercalcemia 2/2 lithium toxicity (Lithium changed to Abilify per Psych), as well as hepatic encephalopathy secondary to medication non compliance. She was treated with lactulose and rifaximin with no improvement in her mental status. EEG showed generalized slowing as well as triphasic discharges in the left hemisphere. MRI Brain WO was unremarkable for acute neurologic change. Decision was made to [place] [facility] [place] of [place] and ongoing evaluation and management. On arrival, mental status exam has waxed and waned with patient being intermittently verbal and following commands. NH4 48.    NCC is consulted on hospital day 4 for admission after EEG showed frequent left hemispheric electrographic seizures lasting on average 10-30 seconds with prolonged seizures over 1.5 hours also noted. Per chart review, patient home dose of LEV increased from 1000mg to 1500mg BID, ZNS increased to 200mg. Vimpat 150mg BID added per General Neurology (had not been given prior to consult). On initial evaluation, patient is not responsive to voice or pain. Upward gaze noted. Decision made to transfer patient to [facility] for higher level of care and  airway watch.       Hospital Course: 06/02/2023 Tachycardic and hypotensive, transferred for development of mostly right hemispheric status, LOC exam remains poor  Intubated for airway protection, EEG has changed to mostly include triphasics with no definite seizure activity per Dr Boss, propofol stopped and AEDs simplified to keppra 1000mg bid only, wean off pressor, give colloids with crystalloid resuscitation  06/03/2023: remains on persistant significant pressor support despite adequate hydration, problems with third spacing and may have pulmonary hypertension as well, consider trial of stress steroids if hgb stabilizes  06/04/2023: levo down to 0.08mcg, start and advance TFs, vaso at 0.04U, ammonia has been stable, slight rise in tbili, check direct bili, hgb and plts improved, no clear source of bleeding, no source of bleeding on CT abdomen, consider superimposed hemolysis  06/05/2023 NAEON. Neurologic exam continues to improve, following commands in all extremities. Levo 0.02, weaning as able. Vaso discontinued, MAP>65. Daily SBT, monitor and hold TF at midnight for possible extubation tomorrow. Hemolysis work up ongoing, trending CBC. Continue CTX for SBP prophylaxis.   06/06/2023 Awake and following commands. SBT with low tidal volumes. Remains intubated for airway protection at this time with possible extubation tomorrow with continued neurologic improvement. Levo discontinued, maintaining SBP<65. Concern for hemolytic anemia related to autoimmune process vs hepatic dysfunction (elevated reticulocyte count and decreased haptoglobin).  06/07/2023 Rested on AC overnight due to low TV and fatigue. SBT with pressure support 10/5 this am, weaning ventilator as tolerated. Continue tube feeds with goal to optimize nutrition. Ammonia elevated, continue lactulose to encourage bowel movement.   06/08/2023 Failed SBT due to apnea. Some breaths on SIMV. Awake and following commands. Ammonia trending down (44) with  BMs, continue lactulose. Advance nutritional support with goal to increase strength towards extubation. Plt transfusions PRN. Cryo for fibrinogen <100.  06/09/2023 Venous US with R brachial and femoral DVT. Dicussed with Hematology with recommendation to start Argatroban with plts>50. Daily SBT with apnea, maintain on SIMV with backup rate of 10. Hyperammonemia resolved.   06/10/2023 patient is more alert and awake, tolerated SBT. Hematology agreed to switch to heparin for DVT given negative for HIT  06/11/2023   On AM rounds, patient was noted to be hypoxic and tachycardic. Hypoxia resolved with ventilator changes but patient continued to appear uncomfortable. Patient lied flat with noted improvement in oxygenation. Patient became acutely hypotensive, tachycardiac and hypoxic not responsive to ventilator changes. IVF bolus + Burton bump x3 + initiation of vasopressin due to concern for hypovolemic shock. Levophed added due to persistent hypertension. L femoral arterial line and R IJ trialysis placed emergently. STAT Hemoglobin 4.4. Received protamine for heparin reversal, 3u Plts and 3 FFP and 2 pRBC. STAT CTA abdo/pelvis with L retroperitoneal hematoma with + spot sign. IR consulted and patient taken class A for diagnostic angiogram with possible emobolization. Pressors weaned after volume resuscitation. Family updated over phone.   06/12/2023 s/p IR embolization of left lumbar and internal iliac branch foci of arterial extravasation. Hemoglobin stable, continue to trend CBC q8 with transfusion of pRBC for Hg<7. Restart trickle feeds. Albumin and lasix for dieresis. Low threshold for antibiotics given risk of SBP and RP hematoma. Unlikely to tolerate AC, will discuss placement of IVC filter with IR.       Interval History:  As above    Review of Systems   Unable to perform ROS: Intubated     Objective:     Vitals:  Temp: 97.9 °F (36.6 °C)  Pulse: 64  Rhythm: normal sinus rhythm, sinus tachycardia  BP: (!) 95/54  MAP  (mmHg): 72  Resp: 16  SpO2: (!) 94 %  Oxygen Concentration (%): 100  Vent Mode: A/C  Set Rate: 16 BPM  Vt Set: 340 mL  PEEP/CPAP: 8 cmH20  Peak Airway Pressure: 52 cmH20  Mean Airway Pressure: 15 cmH20  Plateau Pressure: 0 cmH20    Temp  Min: 97.5 °F (36.4 °C)  Max: 98.9 °F (37.2 °C)  Pulse  Min: 64  Max: 151  BP  Min: 60/33  Max: 158/81  MAP (mmHg)  Min: 41  Max: 110  Resp  Min: 14  Max: 37  SpO2  Min: 78 %  Max: 99 %  Oxygen Concentration (%)  Min: 40  Max: 100    06/11 0701 - 06/12 0700  In: 4706.4 [I.V.:423.1]  Out: 375 [Urine:300]   Unmeasured Output  Urine Occurrence: 1  Stool Occurrence: 1  Emesis Occurrence: 0  Pad Count: 1        Physical Exam  Vitals and nursing note reviewed.   Constitutional:       General: She is not in acute distress.     Appearance: She is ill-appearing.      Comments: Opens eye spontaneously, following commands   HENT:      Head: Normocephalic and atraumatic.   Eyes:      General: Scleral icterus present.      Extraocular Movements: Extraocular movements intact.      Pupils: Pupils are equal, round, and reactive to light.   Cardiovascular:      Rate and Rhythm: Normal rate.   Pulmonary:      Comments:   Intubated and mechanically ventilated, coarse breath sounds throughout.   Abdominal:      Palpations: Abdomen is distended.   Musculoskeletal:      Cervical back: Neck supple.   Skin:     General: Skin is warm.   Neurological:      Mental Status: She is alert.      Comments:   Alert  Intubated, unable to test orientation  Follows commands x4  Pupils equal, reactive  Face grossly symmetric   Moves all extremities antigravity and to commands    Medications:  Continuous   Scheduledalbumin human 25%, 25 g, Once  ARIPiprazole, 5 mg, Daily  folic acid, 1 mg, Daily  furosemide (LASIX) injection, 40 mg, Once  levetiracetam, 500 mg, BID  pantoprazole, 40 mg, BID  rifAXIMin, 550 mg, BID  thiamine, 100 mg, Daily    PRNsodium chloride, , Q24H PRN  sodium chloride, , Q24H PRN  sodium chloride, ,  Q24H PRN  sodium chloride, , Q24H PRN  sodium chloride, , Q24H PRN  sodium chloride, , Q24H PRN  sodium chloride, , Q24H PRN  sodium chloride, , Q24H PRN  sodium chloride, , Q24H PRN  sodium chloride, , Q24H PRN  aluminum-magnesium hydroxide-simethicone, 30 mL, QID PRN  dextrose 10%, 12.5 g, PRN  dextrose 10%, 25 g, PRN  dextrose, 15 g, PRN  dextrose, 30 g, PRN  fentaNYL, 50 mcg, Q1H PRN  glucagon (human recombinant), 1 mg, PRN  insulin aspart U-100, 1-10 Units, Q6H PRN  labetalol, 10 mg, Q4H PRN  magnesium oxide, 800 mg, PRN  magnesium oxide, 800 mg, PRN  melatonin, 6 mg, Nightly PRN  naloxone, 0.02 mg, PRN  ondansetron, 4 mg, Q8H PRN  potassium bicarbonate, 35 mEq, PRN  potassium bicarbonate, 50 mEq, PRN  potassium bicarbonate, 60 mEq, PRN  potassium, sodium phosphates, 2 packet, PRN  potassium, sodium phosphates, 2 packet, PRN  potassium, sodium phosphates, 2 packet, PRN  simethicone, 1 tablet, QID PRN  sodium chloride 0.9%, 10 mL, Q8H PRN      Today I personally reviewed pertinent medications, imaging, laboratory results, notably: n    Hemoglobin 4.4  CTA Abd/Pelvis with large retroperitoneal hematoma    Diet  Diet NPO  Diet NPO          Assessment/Plan:     Neuro  * Non-convulsive status epilepticus  58F with EtOH induced hepatic cirrhosis, seizure disorder on outpatient LEV and ZNS and bipolar disorder admitted on 5/28 for persistent encephalopathy.    - UTI on admit (Proteus mirabilis), now s/p CTX course  - Hypercalcemia 2/2 lithium toxicity (Lithium changed to Abilify per Psych)  - Hepatic encephalopathy 2/2 to medication non compliance, treated with lactulose and rifaximin     MRI Brain WO was unremarkable for acute neurologic change  EEG w/ frequent left hemispheric electrographic seizures and NCSE  Repeat EEGs without seizure activity x 24 hours, now resolved    6/11 --> acute hypotension, hypoxia --> hypovolemic shock --> Large R peritoneal hematoma --> IR for class A embo --> s/p IR embolization of  left lumbar and internal iliac branch foci of arterial extravasation    - Continued admission to [facility]  - Q1h neurochecks while in ICU  - Q1h vitals while in ICU  - HOB@30  - Keppra 500mg BID  - Thiamine replacement   - Lactulose, titrate to BM  - MAP>65  - Daily CMP, Mag, Phos - replete electrolytes PRN  - Lipid panel, A1c, Coags reviewed  - Q8 CBC, transfuse PRN  - BID fibrinogen/PT-INR  - Heparin held in setting of acute bleed  - PT/OT/SLP as appropriate  - CM/SW consult for dispo planning    Acute encephalopathy  Multifactorial   See Non-convulsive status epilepticus    Breakthrough seizure  See Non-convulsive status epilepticus    Psychiatric  Bipolar 1 disorder  See Non-convulsive status epilepticus    Alcohol abuse, in remission  See Non-convulsive status epilepticus    Pulmonary  Acute hypoxemic respiratory failure  Intubated today for airway protection  Difficult intubation due to far anterior airway    Cardiac/Vascular  Hemorrhagic shock  See Hypovolemic shock    Hypovolemic shock  6/11 --> acute hypotension, hypoxia --> hypovolemic shock --> Large R peritoneal hematoma --> IR for class A embo   s/p IR embolization of left lumbar and internal iliac branch foci of arterial extravasation    Hypotension (arterial)  Hypotensive 2/2 hypovolemic/hemorrhagic shock on 6/11  CTA with large retroperitoneal hematoma, s/p IR embolization of left lumbar and internal iliac branch foci of arterial extravasationn     Renal/  Hypernatremia  Daily CMP  Improved with free water administration, RESOLVED     Hematology  Coagulopathy  Heme consulted  Thrombocytopenic and Liver disease     Acute deep vein thrombosis (DVT) of brachial vein of right upper extremity  Noted on 6/8  Argatroban per Heme recs - transitioned to heparin 6/10  HELD 6/11 in setting of retroperitoneal bleed  ?IVC filter    DVT of deep femoral vein, right  Noted on 6/8  Argatroban per Heme recs - transitioned to heparin  HELD 6/11 in setting of hypovolemic  shock 2/2 retroperitoneal hemorrhage   ?IVC filter    Thrombocytopenia  Likely secondary to hepatic cirrhosis and development of splenomegaly  Has worsened and associated with hgb drop, transfuse to >50K  No signs of bleeding in GI tract or on CT, hemolysis on labs  Hematology consulted, tested negative for HIT    6/11 --> acute hypotension, hypoxia --> hypovolemic shock --> Large R peritoneal hematoma --> IR for class A embo --> s/p IR embolization of left lumbar and internal iliac branch foci of arterial extravasation    Oncology  Anemia  Chronic    Macrocytic anemia  Follow CBC    Endocrine  Hyperammonemia  Stable on current medical regimen   See Non-convulsive status epilepticus      Severe protein-calorie malnutrition  Nutrition consulted. Most recent weight and BMI monitored-     Measurements:  Wt Readings from Last 1 Encounters:   06/03/23 59.9 kg (132 lb)   Body mass index is 24.14 kg/m².    Patient has been screened and assessed by RD.    Malnutrition Type:  Context: social/environmental circumstances  Level: severe    Malnutrition Characteristic Summary:  Weight Loss (Malnutrition):  (23% x 4 months)  Energy Intake (Malnutrition): less than or equal to 75% for greater than or equal to 1 month  Subcutaneous Fat (Malnutrition): severe depletion (suspecting)  Muscle Mass (Malnutrition): severe depletion  Fluid Accumulation (Malnutrition): other (see comments) (mild-moderate)    Interventions/Recommendations (treatment strategy):  1. Recommend goal of Impact Peptide 1.5 @ 40 ml/hr to provide 1440 kcal, 90 g pro, 739 ml water. Advance/adjust as appropriate. 2. Bolus TF rec: Impact Peptide 1.5 4 cans per day to provide 1500 kcal, 94 g pro, 772 mL water. 3. RD following.    GI  Acute liver failure without hepatic coma  GI/Hepatology following    Hepatic cirrhosis  Continue Lactulose and Rifaximin   Ammonia elevated but downtrending with bowel reg  Follow coags and fibrinogen      Hepatic encephalopathy  Continue  lactulose   See Non-convulsive status epilepticus    Other  Retroperitoneal bleed  6/11 --> acute hypotension, hypoxia --> hypovolemic shock --> Large R peritoneal hematoma --> IR for class A embo --> s/p IR embolization of left lumbar and internal iliac branch foci of arterial extravasation          The patient is being Prophylaxed for:  Venous Thromboembolism with: Mechanical  Stress Ulcer with: H2B  Ventilator Pneumonia with: chlorhexidine oral care    Activity Orders          Bed rest starting at 06/12 0849    Elevate HOB flat until bedrest complete starting at 06/11 1641    Diet NPO: NPO starting at 06/01 1055    Turn patient starting at 05/28 2253    Progressive Mobility Protocol (mobilize patient to their highest level of functioning at least twice daily) starting at 05/28 2000        Full Code    Cameron Yadav MD  Neurocritical Care  [facility] - Neuro Critical Care

## 2023-06-12 NOTE — TREATMENT PLAN
Brief GI Treatment Plan    Patient found to have retroperitoneal bleed on CTA yesterday. IR performed emergent embolization of left lumbar and internal iliac branches. No overt GI bleeding per RN.     Hb 4.4> 7.6>7.1 this AM.  She received a total of 5 units PRBCs, 6 units platelets, 1 units cryoprecipitate and 3 units FFP.    Exam:   Gen: Intubated & mechanically ventilated. Patient awakens to commands.   Abdomen: Soft, NT. No guarding or rigidity. Checked fecal management system.     Recs:   - Can discontinue octreotide  - Continue IV PPI Qday  - Consider hepatology consult once extubated  - Trend H&H. Transfuse PRBCs to keep Hb > 7 g/dl.    Signing off. Please reach out for any concerns.     Steve Llody MD  PGY-IV, GI

## 2023-06-12 NOTE — ASSESSMENT & PLAN NOTE
Likely secondary to hepatic cirrhosis and development of splenomegaly  Has worsened and associated with hgb drop, transfuse to >50K  No signs of bleeding in GI tract or on CT, hemolysis on labs  Hematology consulted, tested negative for HIT    6/11 --> acute hypotension, hypoxia --> hypovolemic shock --> Large R peritoneal hematoma --> IR for class A embo --> s/p IR embolization of left lumbar and internal iliac branch foci of arterial extravasation

## 2023-06-13 LAB
ALBUMIN SERPL BCP-MCNC: 2.7 G/DL (ref 3.5–5.2)
ALBUMIN SERPL BCP-MCNC: 3 G/DL (ref 3.5–5.2)
ALLENS TEST: ABNORMAL
ALP SERPL-CCNC: 122 U/L (ref 55–135)
ALP SERPL-CCNC: 98 U/L (ref 55–135)
ALT SERPL W/O P-5'-P-CCNC: 25 U/L (ref 10–44)
ALT SERPL W/O P-5'-P-CCNC: 25 U/L (ref 10–44)
AMMONIA PLAS-SCNC: 44 UMOL/L (ref 10–50)
ANION GAP SERPL CALC-SCNC: 9 MMOL/L (ref 8–16)
ANISOCYTOSIS BLD QL SMEAR: SLIGHT
ANISOCYTOSIS BLD QL SMEAR: SLIGHT
APTT PPP: 36.2 SEC (ref 21–32)
APTT PPP: 38.3 SEC (ref 21–32)
AST SERPL-CCNC: 35 U/L (ref 10–40)
AST SERPL-CCNC: 37 U/L (ref 10–40)
BASOPHILS # BLD AUTO: 0.01 K/UL (ref 0–0.2)
BASOPHILS NFR BLD: 0.1 % (ref 0–1.9)
BASOPHILS NFR BLD: 0.2 % (ref 0–1.9)
BASOPHILS NFR BLD: 0.2 % (ref 0–1.9)
BILIRUB DIRECT SERPL-MCNC: 1.1 MG/DL (ref 0.1–0.3)
BILIRUB SERPL-MCNC: 3.5 MG/DL (ref 0.1–1)
BILIRUB SERPL-MCNC: 3.8 MG/DL (ref 0.1–1)
BLD PROD TYP BPU: NORMAL
BLOOD UNIT EXPIRATION DATE: NORMAL
BLOOD UNIT TYPE CODE: 5100
BLOOD UNIT TYPE: NORMAL
BUN SERPL-MCNC: 28 MG/DL (ref 6–20)
BURR CELLS BLD QL SMEAR: ABNORMAL
CALCIUM SERPL-MCNC: 8.4 MG/DL (ref 8.7–10.5)
CHLORIDE SERPL-SCNC: 112 MMOL/L (ref 95–110)
CO2 SERPL-SCNC: 23 MMOL/L (ref 23–29)
CODING SYSTEM: NORMAL
CREAT SERPL-MCNC: 0.5 MG/DL (ref 0.5–1.4)
CROSSMATCH INTERPRETATION: NORMAL
DELSYS: ABNORMAL
DIFFERENTIAL METHOD: ABNORMAL
DISPENSE STATUS: NORMAL
DOHLE BOD BLD QL SMEAR: PRESENT
EOSINOPHIL # BLD AUTO: 0.1 K/UL (ref 0–0.5)
EOSINOPHIL NFR BLD: 1.1 % (ref 0–8)
EOSINOPHIL NFR BLD: 1.8 % (ref 0–8)
EOSINOPHIL NFR BLD: 1.9 % (ref 0–8)
ERYTHROCYTE [DISTWIDTH] IN BLOOD BY AUTOMATED COUNT: 17.2 % (ref 11.5–14.5)
ERYTHROCYTE [DISTWIDTH] IN BLOOD BY AUTOMATED COUNT: 17.9 % (ref 11.5–14.5)
ERYTHROCYTE [DISTWIDTH] IN BLOOD BY AUTOMATED COUNT: 18.3 % (ref 11.5–14.5)
ERYTHROCYTE [SEDIMENTATION RATE] IN BLOOD BY WESTERGREN METHOD: 14 MM/H
ERYTHROCYTE [SEDIMENTATION RATE] IN BLOOD BY WESTERGREN METHOD: 16 MM/H
EST. GFR  (NO RACE VARIABLE): >60 ML/MIN/1.73 M^2
FIBRINOGEN PPP-MCNC: 114 MG/DL (ref 182–400)
FIBRINOGEN PPP-MCNC: 115 MG/DL (ref 182–400)
FIO2: 100
FIO2: 50
FIO2: 50
FIO2: 60
GLUCOSE SERPL-MCNC: 151 MG/DL (ref 70–110)
HCO3 UR-SCNC: 22.4 MMOL/L (ref 24–28)
HCO3 UR-SCNC: 23 MMOL/L (ref 24–28)
HCO3 UR-SCNC: 24.4 MMOL/L (ref 24–28)
HCO3 UR-SCNC: 26.2 MMOL/L (ref 24–28)
HCT VFR BLD AUTO: 20.3 % (ref 37–48.5)
HCT VFR BLD AUTO: 22.6 % (ref 37–48.5)
HCT VFR BLD AUTO: 24 % (ref 37–48.5)
HCT VFR BLD CALC: 19 %PCV (ref 36–54)
HGB BLD-MCNC: 6.9 G/DL (ref 12–16)
HGB BLD-MCNC: 7.3 G/DL (ref 12–16)
HGB BLD-MCNC: 7.8 G/DL (ref 12–16)
HYPOCHROMIA BLD QL SMEAR: ABNORMAL
HYPOCHROMIA BLD QL SMEAR: ABNORMAL
IMM GRANULOCYTES # BLD AUTO: 0.03 K/UL (ref 0–0.04)
IMM GRANULOCYTES NFR BLD AUTO: 0.4 % (ref 0–0.5)
IMM GRANULOCYTES NFR BLD AUTO: 0.5 % (ref 0–0.5)
IMM GRANULOCYTES NFR BLD AUTO: 0.7 % (ref 0–0.5)
INR PPP: 1.8 (ref 0.8–1.2)
INR PPP: 1.9 (ref 0.8–1.2)
LABORATORY COMMENT REPORT: NORMAL
LYMPHOCYTES # BLD AUTO: 0.6 K/UL (ref 1–4.8)
LYMPHOCYTES # BLD AUTO: 0.8 K/UL (ref 1–4.8)
LYMPHOCYTES # BLD AUTO: 1.1 K/UL (ref 1–4.8)
LYMPHOCYTES NFR BLD: 13.5 % (ref 18–48)
LYMPHOCYTES NFR BLD: 14.2 % (ref 18–48)
LYMPHOCYTES NFR BLD: 14.6 % (ref 18–48)
MAGNESIUM SERPL-MCNC: 1.7 MG/DL (ref 1.6–2.6)
MCH RBC QN AUTO: 29.3 PG (ref 27–31)
MCH RBC QN AUTO: 29.4 PG (ref 27–31)
MCH RBC QN AUTO: 30.3 PG (ref 27–31)
MCHC RBC AUTO-ENTMCNC: 32.3 G/DL (ref 32–36)
MCHC RBC AUTO-ENTMCNC: 32.5 G/DL (ref 32–36)
MCHC RBC AUTO-ENTMCNC: 34 G/DL (ref 32–36)
MCV RBC AUTO: 89 FL (ref 82–98)
MCV RBC AUTO: 90 FL (ref 82–98)
MCV RBC AUTO: 91 FL (ref 82–98)
MIN VOL: 5.2
MIN VOL: 5.61
MODE: ABNORMAL
MONOCYTES # BLD AUTO: 0.5 K/UL (ref 0.3–1)
MONOCYTES # BLD AUTO: 0.6 K/UL (ref 0.3–1)
MONOCYTES # BLD AUTO: 0.9 K/UL (ref 0.3–1)
MONOCYTES NFR BLD: 10.4 % (ref 4–15)
MONOCYTES NFR BLD: 11.1 % (ref 4–15)
MONOCYTES NFR BLD: 11.4 % (ref 4–15)
NEUTROPHILS # BLD AUTO: 3.1 K/UL (ref 1.8–7.7)
NEUTROPHILS # BLD AUTO: 4.1 K/UL (ref 1.8–7.7)
NEUTROPHILS # BLD AUTO: 5.8 K/UL (ref 1.8–7.7)
NEUTROPHILS NFR BLD: 71.3 % (ref 38–73)
NEUTROPHILS NFR BLD: 72.8 % (ref 38–73)
NEUTROPHILS NFR BLD: 73.8 % (ref 38–73)
NRBC BLD-RTO: 0 /100 WBC
NUM UNITS TRANS PACKED RBC: NORMAL
OVALOCYTES BLD QL SMEAR: ABNORMAL
OVALOCYTES BLD QL SMEAR: ABNORMAL
PCO2 BLDA: 30.6 MMHG (ref 35–45)
PCO2 BLDA: 31.2 MMHG (ref 35–45)
PCO2 BLDA: 31.5 MMHG (ref 35–45)
PCO2 BLDA: 31.8 MMHG (ref 35–45)
PEEP: 8
PH SMN: 7.46 [PH] (ref 7.35–7.45)
PH SMN: 7.47 [PH] (ref 7.35–7.45)
PH SMN: 7.5 [PH] (ref 7.35–7.45)
PH SMN: 7.54 [PH] (ref 7.35–7.45)
PHOSPHATE SERPL-MCNC: 2.4 MG/DL (ref 2.7–4.5)
PIP: 23
PIP: 26
PLATELET # BLD AUTO: 42 K/UL (ref 150–450)
PLATELET # BLD AUTO: 52 K/UL (ref 150–450)
PLATELET # BLD AUTO: 68 K/UL (ref 150–450)
PLATELET BLD QL SMEAR: ABNORMAL
PLATELET BLD QL SMEAR: ABNORMAL
PMV BLD AUTO: 10.2 FL (ref 9.2–12.9)
PMV BLD AUTO: 10.4 FL (ref 9.2–12.9)
PMV BLD AUTO: 11 FL (ref 9.2–12.9)
PO2 BLDA: 202 MMHG (ref 80–100)
PO2 BLDA: 59 MMHG (ref 80–100)
PO2 BLDA: 78 MMHG (ref 80–100)
PO2 BLDA: 83 MMHG (ref 80–100)
POC BE: -1 MMOL/L
POC BE: -1 MMOL/L
POC BE: 1 MMOL/L
POC BE: 4 MMOL/L
POC IONIZED CALCIUM: 1.19 MMOL/L (ref 1.06–1.42)
POC SATURATED O2: 100 % (ref 95–100)
POC SATURATED O2: 92 % (ref 95–100)
POC SATURATED O2: 97 % (ref 95–100)
POC SATURATED O2: 97 % (ref 95–100)
POC TCO2: 23 MMOL/L (ref 23–27)
POC TCO2: 24 MMOL/L (ref 23–27)
POC TCO2: 25 MMOL/L (ref 23–27)
POC TCO2: 27 MMOL/L (ref 23–27)
POCT GLUCOSE: 169 MG/DL (ref 70–110)
POCT GLUCOSE: 194 MG/DL (ref 70–110)
POCT GLUCOSE: 207 MG/DL (ref 70–110)
POIKILOCYTOSIS BLD QL SMEAR: SLIGHT
POIKILOCYTOSIS BLD QL SMEAR: SLIGHT
POLYCHROMASIA BLD QL SMEAR: ABNORMAL
POLYCHROMASIA BLD QL SMEAR: ABNORMAL
POTASSIUM BLD-SCNC: 4.7 MMOL/L (ref 3.5–5.1)
POTASSIUM SERPL-SCNC: 2.8 MMOL/L (ref 3.5–5.1)
PROT SERPL-MCNC: 4.1 G/DL (ref 6–8.4)
PROT SERPL-MCNC: 4.2 G/DL (ref 6–8.4)
PROTHROMBIN TIME: 18.8 SEC (ref 9–12.5)
PROTHROMBIN TIME: 19.3 SEC (ref 9–12.5)
RBC # BLD AUTO: 2.28 M/UL (ref 4–5.4)
RBC # BLD AUTO: 2.48 M/UL (ref 4–5.4)
RBC # BLD AUTO: 2.66 M/UL (ref 4–5.4)
SAMPLE: ABNORMAL
SITE: ABNORMAL
SODIUM BLD-SCNC: 145 MMOL/L (ref 136–145)
SODIUM SERPL-SCNC: 144 MMOL/L (ref 136–145)
SP02: 92
SP02: 95
SP02: 96
SP02: 98
SPHEROCYTES BLD QL SMEAR: ABNORMAL
SPHEROCYTES BLD QL SMEAR: ABNORMAL
SRA 0.1IU/ML UFH SER-ACNC: 9 %
SRA 100IU/ML UFH SER-ACNC: <5 %
SRA INTERP SER-IMP: NORMAL
SRA UFH SER-IMP: NEGATIVE
TOXIC GRANULES BLD QL SMEAR: PRESENT
TROPONIN I SERPL DL<=0.01 NG/ML-MCNC: 0.03 NG/ML (ref 0–0.03)
TROPONIN I SERPL DL<=0.01 NG/ML-MCNC: 0.03 NG/ML (ref 0–0.03)
VT: 340
WBC # BLD AUTO: 4.39 K/UL (ref 3.9–12.7)
WBC # BLD AUTO: 5.65 K/UL (ref 3.9–12.7)
WBC # BLD AUTO: 7.84 K/UL (ref 3.9–12.7)

## 2023-06-13 PROCEDURE — 80053 COMPREHEN METABOLIC PANEL: CPT | Performed by: PHYSICIAN ASSISTANT

## 2023-06-13 PROCEDURE — 20000000 HC ICU ROOM

## 2023-06-13 PROCEDURE — 85025 COMPLETE CBC W/AUTO DIFF WBC: CPT | Performed by: PSYCHIATRY & NEUROLOGY

## 2023-06-13 PROCEDURE — 63600175 PHARM REV CODE 636 W HCPCS: Performed by: PHYSICIAN ASSISTANT

## 2023-06-13 PROCEDURE — 82330 ASSAY OF CALCIUM: CPT

## 2023-06-13 PROCEDURE — 84132 ASSAY OF SERUM POTASSIUM: CPT

## 2023-06-13 PROCEDURE — P9047 ALBUMIN (HUMAN), 25%, 50ML: HCPCS | Mod: JZ,JG | Performed by: PHYSICIAN ASSISTANT

## 2023-06-13 PROCEDURE — 85014 HEMATOCRIT: CPT

## 2023-06-13 PROCEDURE — 82803 BLOOD GASES ANY COMBINATION: CPT

## 2023-06-13 PROCEDURE — 82140 ASSAY OF AMMONIA: CPT | Performed by: NURSE PRACTITIONER

## 2023-06-13 PROCEDURE — 85025 COMPLETE CBC W/AUTO DIFF WBC: CPT | Mod: 91 | Performed by: PHYSICIAN ASSISTANT

## 2023-06-13 PROCEDURE — 84484 ASSAY OF TROPONIN QUANT: CPT | Performed by: PSYCHIATRY & NEUROLOGY

## 2023-06-13 PROCEDURE — 99291 CRITICAL CARE FIRST HOUR: CPT | Mod: GC,,, | Performed by: PSYCHIATRY & NEUROLOGY

## 2023-06-13 PROCEDURE — 25000003 PHARM REV CODE 250

## 2023-06-13 PROCEDURE — 84100 ASSAY OF PHOSPHORUS: CPT | Performed by: PHYSICIAN ASSISTANT

## 2023-06-13 PROCEDURE — 25000003 PHARM REV CODE 250: Performed by: STUDENT IN AN ORGANIZED HEALTH CARE EDUCATION/TRAINING PROGRAM

## 2023-06-13 PROCEDURE — 99291 PR CRITICAL CARE, E/M 30-74 MINUTES: ICD-10-PCS | Mod: GC,,, | Performed by: PSYCHIATRY & NEUROLOGY

## 2023-06-13 PROCEDURE — C9113 INJ PANTOPRAZOLE SODIUM, VIA: HCPCS | Performed by: STUDENT IN AN ORGANIZED HEALTH CARE EDUCATION/TRAINING PROGRAM

## 2023-06-13 PROCEDURE — 94003 VENT MGMT INPAT SUBQ DAY: CPT

## 2023-06-13 PROCEDURE — 80076 HEPATIC FUNCTION PANEL: CPT | Performed by: NURSE PRACTITIONER

## 2023-06-13 PROCEDURE — 83735 ASSAY OF MAGNESIUM: CPT | Performed by: PHYSICIAN ASSISTANT

## 2023-06-13 PROCEDURE — 85730 THROMBOPLASTIN TIME PARTIAL: CPT | Mod: 91 | Performed by: PSYCHIATRY & NEUROLOGY

## 2023-06-13 PROCEDURE — P9016 RBC LEUKOCYTES REDUCED: HCPCS | Performed by: STUDENT IN AN ORGANIZED HEALTH CARE EDUCATION/TRAINING PROGRAM

## 2023-06-13 PROCEDURE — 27200966 HC CLOSED SUCTION SYSTEM

## 2023-06-13 PROCEDURE — 27000221 HC OXYGEN, UP TO 24 HOURS

## 2023-06-13 PROCEDURE — 99223 PR INITIAL HOSPITAL CARE,LEVL III: ICD-10-PCS | Mod: ,,, | Performed by: PHYSICIAN ASSISTANT

## 2023-06-13 PROCEDURE — 99900026 HC AIRWAY MAINTENANCE (STAT)

## 2023-06-13 PROCEDURE — 99900035 HC TECH TIME PER 15 MIN (STAT)

## 2023-06-13 PROCEDURE — 85610 PROTHROMBIN TIME: CPT | Performed by: STUDENT IN AN ORGANIZED HEALTH CARE EDUCATION/TRAINING PROGRAM

## 2023-06-13 PROCEDURE — 37799 UNLISTED PX VASCULAR SURGERY: CPT

## 2023-06-13 PROCEDURE — 85384 FIBRINOGEN ACTIVITY: CPT | Performed by: STUDENT IN AN ORGANIZED HEALTH CARE EDUCATION/TRAINING PROGRAM

## 2023-06-13 PROCEDURE — 63600175 PHARM REV CODE 636 W HCPCS: Performed by: STUDENT IN AN ORGANIZED HEALTH CARE EDUCATION/TRAINING PROGRAM

## 2023-06-13 PROCEDURE — 84295 ASSAY OF SERUM SODIUM: CPT

## 2023-06-13 PROCEDURE — 94761 N-INVAS EAR/PLS OXIMETRY MLT: CPT

## 2023-06-13 PROCEDURE — 25500020 PHARM REV CODE 255: Performed by: PSYCHIATRY & NEUROLOGY

## 2023-06-13 PROCEDURE — 99223 1ST HOSP IP/OBS HIGH 75: CPT | Mod: ,,, | Performed by: PHYSICIAN ASSISTANT

## 2023-06-13 RX ORDER — HYDROCODONE BITARTRATE AND ACETAMINOPHEN 500; 5 MG/1; MG/1
TABLET ORAL
Status: DISCONTINUED | OUTPATIENT
Start: 2023-06-13 | End: 2023-06-14

## 2023-06-13 RX ORDER — FUROSEMIDE 10 MG/ML
40 INJECTION INTRAMUSCULAR; INTRAVENOUS ONCE
Status: COMPLETED | OUTPATIENT
Start: 2023-06-13 | End: 2023-06-13

## 2023-06-13 RX ORDER — ALBUMIN HUMAN 250 G/1000ML
25 SOLUTION INTRAVENOUS ONCE
Status: COMPLETED | OUTPATIENT
Start: 2023-06-13 | End: 2023-06-13

## 2023-06-13 RX ADMIN — ARIPIPRAZOLE 5 MG: 5 TABLET ORAL at 08:06

## 2023-06-13 RX ADMIN — ALBUMIN HUMAN 25 G: 0.25 SOLUTION INTRAVENOUS at 02:06

## 2023-06-13 RX ADMIN — POTASSIUM & SODIUM PHOSPHATES POWDER PACK 280-160-250 MG 2 PACKET: 280-160-250 PACK at 08:06

## 2023-06-13 RX ADMIN — Medication 100 MG: at 08:06

## 2023-06-13 RX ADMIN — RIFAXIMIN 550 MG: 550 TABLET ORAL at 08:06

## 2023-06-13 RX ADMIN — POTASSIUM & SODIUM PHOSPHATES POWDER PACK 280-160-250 MG 2 PACKET: 280-160-250 PACK at 12:06

## 2023-06-13 RX ADMIN — FUROSEMIDE 40 MG: 10 INJECTION, SOLUTION INTRAMUSCULAR; INTRAVENOUS at 02:06

## 2023-06-13 RX ADMIN — LEVETIRACETAM 500 MG: 500 SOLUTION ORAL at 08:06

## 2023-06-13 RX ADMIN — FOLIC ACID 1 MG: 1 TABLET ORAL at 08:06

## 2023-06-13 RX ADMIN — POTASSIUM BICARBONATE 60 MEQ: 391 TABLET, EFFERVESCENT ORAL at 08:06

## 2023-06-13 RX ADMIN — INSULIN ASPART 2 UNITS: 100 INJECTION, SOLUTION INTRAVENOUS; SUBCUTANEOUS at 08:06

## 2023-06-13 RX ADMIN — IOHEXOL 150 ML: 350 INJECTION, SOLUTION INTRAVENOUS at 11:06

## 2023-06-13 RX ADMIN — Medication 800 MG: at 06:06

## 2023-06-13 RX ADMIN — PANTOPRAZOLE SODIUM 40 MG: 40 INJECTION, POWDER, FOR SOLUTION INTRAVENOUS at 08:06

## 2023-06-13 RX ADMIN — INSULIN ASPART 2 UNITS: 100 INJECTION, SOLUTION INTRAVENOUS; SUBCUTANEOUS at 12:06

## 2023-06-13 RX ADMIN — INSULIN ASPART 4 UNITS: 100 INJECTION, SOLUTION INTRAVENOUS; SUBCUTANEOUS at 05:06

## 2023-06-13 RX ADMIN — POTASSIUM BICARBONATE 60 MEQ: 391 TABLET, EFFERVESCENT ORAL at 06:06

## 2023-06-13 NOTE — PLAN OF CARE
[facility] Care Plan    POC reviewed with Marely Hamilton and family at 1400. Pt nodded understanding. Questions and concerns addressed. No acute events today. Pt progressing toward goals. Will continue to monitor. See below and flowsheets for full assessment and VS info.     - PIVs in L arm removed.   - CT abdomen completed.   - 1 unit PRBCs completed.   - Bath completed and linen changed.   - Wound care completed.   - Fentanyl titrated as needed.   - Pt on 50% FiO2.   - Potassium and phos replaced.           Is this a stroke patient? no    Neuro:  Cheryl Coma Scale  Best Eye Response: 4-->(E4) spontaneous  Best Motor Response: 6-->(M6) obeys commands  Best Verbal Response: 1-->(V1) none  Cheryl Coma Scale Score: 11  Assessment Qualifiers: no eye obstruction present, patient intubated  Pupil PERRLA: yes     24 hr Temp:  [98 °F (36.7 °C)-98.9 °F (37.2 °C)]     CV:   Rhythm: normal sinus rhythm, sinus tachycardia  BP goals:   SBP < 180  MAP >  55    Resp:      Vent Mode: A/C  Set Rate: 14 BPM  Oxygen Concentration (%): 50  Vt Set: 340 mL  PEEP/CPAP: 8 cmH20  Pressure Support: 10 cmH20    Plan: wean to extubate    GI/:     Diet/Nutrition Received: NPO, tube feeding  Last Bowel Movement: 06/13/23  Voiding Characteristics: external catheter    Intake/Output Summary (Last 24 hours) at 6/13/2023 1527  Last data filed at 6/13/2023 1501  Gross per 24 hour   Intake 811.37 ml   Output 1350 ml   Net -538.63 ml     Unmeasured Output  Urine Occurrence: 1  Stool Occurrence: 1  Emesis Occurrence: 0  Pad Count: 1    Labs/Accuchecks:  Recent Labs   Lab 06/13/23  1033 06/13/23  1040   WBC 5.65  --    RBC 2.28*  --    HGB 6.9*  --    HCT 20.3* 19*   PLT 52*  --       Recent Labs   Lab 06/13/23  0444      K 2.8*   CO2 23   *   BUN 28*   CREATININE 0.5   ALKPHOS 98   ALT 25   AST 37   BILITOT 3.5*      Recent Labs   Lab 06/13/23  0830   INR 1.9*   APTT 36.2*      Recent Labs   Lab 06/13/23  1033   TROPONINI 0.030*        Electrolytes: Electrolytes replaced  Accuchecks: Q6H    Gtts:   fentanyl 12.5 mcg/hr (06/13/23 1029)       LDA/Wounds:  Lines/Drains/Airways       Central Venous Catheter Line  Duration             Trialysis (Dialysis) Catheter 06/11/23 1300 right internal jugular 2 days              Drain  Duration                  NG/OG Tube 06/01/23 2200 Right nostril 11 days         Rectal Tube 06/03/23 1300 10 days         Urethral Catheter 06/12/23 1405 1 day              Airway  Duration                  Airway - Non-Surgical 06/11/23 1643 1 day              Arterial Line  Duration             Arterial Line 06/11/23 1300 Left Femoral 2 days              Peripheral Intravenous Line  Duration                  Midline Catheter Insertion/Assessment  - Single Lumen 05/25/23 1005 Right basilic vein (medial side of arm) other (see comments) 19 days         Peripheral IV - Single Lumen 06/09/23 1513 18 G Anterior;Distal;Left Upper Arm 4 days         Peripheral IV - Single Lumen 06/09/23 1515 20 G Anterior;Left;Proximal Forearm 4 days                  Wounds: Yes  Wound care consulted: Yes     Wound care to bedside for evaluation - Pt was taken down for CT abdomen and not stable for turning.

## 2023-06-13 NOTE — SUBJECTIVE & OBJECTIVE
Interval History:  As above    Review of Systems   Unable to perform ROS: Intubated     Objective:     Vitals:  Temp: 98.2 °F (36.8 °C)  Pulse: 80  Rhythm: normal sinus rhythm, sinus tachycardia  BP: (!) 111/56  MAP (mmHg): 81  Resp: 16  SpO2: 95 %  Oxygen Concentration (%): 50  Vent Mode: A/C  Set Rate: 14 BPM  Vt Set: 340 mL  PEEP/CPAP: 8 cmH20  Peak Airway Pressure: 58 cmH20  Mean Airway Pressure: 10 cmH20  Plateau Pressure: 0 cmH20    Temp  Min: 97.6 °F (36.4 °C)  Max: 98.9 °F (37.2 °C)  Pulse  Min: 69  Max: 95  BP  Min: 95/53  Max: 146/65  MAP (mmHg)  Min: 72  Max: 119  Resp  Min: 14  Max: 19  SpO2  Min: 91 %  Max: 97 %  Oxygen Concentration (%)  Min: 50  Max: 100    06/12 0701 - 06/13 0700  In: 1419.9 [I.V.:216.1]  Out: 1800 [Urine:1550]   Unmeasured Output  Urine Occurrence: 1  Stool Occurrence: 1  Emesis Occurrence: 0  Pad Count: 1        Physical Exam  Vitals and nursing note reviewed.   Constitutional:       General: She is not in acute distress.     Appearance: She is ill-appearing.      Comments: Opens eye spontaneously, following commands   HENT:      Head: Normocephalic and atraumatic.   Eyes:      General: Scleral icterus present.      Extraocular Movements: Extraocular movements intact.      Pupils: Pupils are equal, round, and reactive to light.   Cardiovascular:      Rate and Rhythm: Normal rate.   Pulmonary:      Comments:   Intubated and mechanically ventilated  Abdominal:      Palpations: Abdomen is distended but soft  Musculoskeletal:      Cervical back: Neck supple.   Skin:     General: Skin is warm.   Neurological:      Mental Status: She is alert.      Comments:   Alert  Intubated, unable to test orientation  Follows commands x4  Pupils equal, reactive  Face grossly symmetric   Moves all extremities antigravity and to commands    Medications:  Continuousfentanyl, Last Rate: 12.5 mcg/hr (06/13/23 0901)    ScheduledARIPiprazole, 5 mg, Daily  folic acid, 1 mg, Daily  levetiracetam, 500 mg,  BID  pantoprazole, 40 mg, BID  rifAXIMin, 550 mg, BID  thiamine, 100 mg, Daily    PRNaluminum-magnesium hydroxide-simethicone, 30 mL, QID PRN  dextrose 10%, 12.5 g, PRN  dextrose 10%, 25 g, PRN  dextrose, 15 g, PRN  dextrose, 30 g, PRN  fentaNYL, 50 mcg, Q1H PRN  glucagon (human recombinant), 1 mg, PRN  insulin aspart U-100, 1-10 Units, Q6H PRN  labetalol, 10 mg, Q4H PRN  magnesium oxide, 800 mg, PRN  magnesium oxide, 800 mg, PRN  melatonin, 6 mg, Nightly PRN  naloxone, 0.02 mg, PRN  ondansetron, 4 mg, Q8H PRN  potassium bicarbonate, 35 mEq, PRN  potassium bicarbonate, 50 mEq, PRN  potassium bicarbonate, 60 mEq, PRN  potassium, sodium phosphates, 2 packet, PRN  potassium, sodium phosphates, 2 packet, PRN  potassium, sodium phosphates, 2 packet, PRN  simethicone, 1 tablet, QID PRN  sodium chloride 0.9%, 10 mL, Q8H PRN      Today I personally reviewed pertinent medications, imaging, laboratory results, notably: n    Hemoglobin stable    Diet  Diet NPO  Diet NPO

## 2023-06-13 NOTE — PLAN OF CARE
[facility] Care Plan    POC reviewed with Marely Hamilton and family at 0300. Pt unable to verbalize understanding due to ett. Questions and concerns addressed. No acute events overnight. Pt progressing toward goals. Will continue to monitor. See below and flowsheets for full assessment and VS info.     -albumin 25g given  -lasix 40mg given  -weaning FIO2  -wounds cleansed and redressed.          Is this a stroke patient? no    Neuro:  Cheryl Coma Scale  Best Eye Response: 4-->(E4) spontaneous  Best Motor Response: 6-->(M6) obeys commands  Best Verbal Response: 1-->(V1) none  [place] Coma Scale Score: 11  Assessment Qualifiers: no eye obstruction present, patient intubated  Pupil PERRLA: no (baseline)     24hr Temp:  [97.6 °F (36.4 °C)-98.9 °F (37.2 °C)]     CV:   Rhythm: normal sinus rhythm  BP goals:   SBP < 140  MAP > 65    Resp:      Vent Mode: A/C  Set Rate: 14 BPM  Oxygen Concentration (%): 60  Vt Set: 340 mL  PEEP/CPAP: 8 cmH20  Pressure Support: 10 cmH20    Plan: wean to extubate    GI/:     Diet/Nutrition Received: NPO, tube feeding  Last Bowel Movement: 06/10/23  Voiding Characteristics: external catheter    Intake/Output Summary (Last 24 hours) at 6/13/2023 0625  Last data filed at 6/13/2023 0515  Gross per 24 hour   Intake 1149.86 ml   Output 1400 ml   Net -250.14 ml     Unmeasured Output  Urine Occurrence: 1  Stool Occurrence: 1  Emesis Occurrence: 0  Pad Count: 1    Labs/Accuchecks:  Recent Labs   Lab 06/12/23  2330   WBC 7.84   RBC 2.48*   HGB 7.3*   HCT 22.6*   PLT 68*      Recent Labs   Lab 06/13/23  0444      K 2.8*   CO2 23   *   BUN 28*   CREATININE 0.5   ALKPHOS 98   ALT 25   AST 37   BILITOT 3.5*      Recent Labs   Lab 06/12/23  2330   INR 1.8*   APTT 35.3*    No results for input(s): CPK, CPKMB, TROPONINI, MB in the last 168 hours.    Electrolytes: Electrolytes replaced  Accuchecks: Q6H    Gtts:   fentanyl Stopped (06/13/23 0440)       LDA/Wounds:  Lines/Drains/Airways       Central  Venous Catheter Line  Duration             Trialysis (Dialysis) Catheter 06/11/23 1300 right internal jugular 1 day              Drain  Duration                  NG/OG Tube 06/01/23 2200 Right nostril 11 days         Rectal Tube 06/03/23 1300 9 days         Urethral Catheter 06/12/23 1405 <1 day              Airway  Duration                  Airway - Non-Surgical 06/11/23 1643 1 day              Arterial Line  Duration             Arterial Line 06/11/23 1300 Left Femoral 1 day              Peripheral Intravenous Line  Duration                  Midline Catheter Insertion/Assessment  - Single Lumen 05/25/23 1005 Right basilic vein (medial side of arm) other (see comments) 18 days         Peripheral IV - Single Lumen 06/09/23 1513 18 G Anterior;Distal;Left Upper Arm 3 days         Peripheral IV - Single Lumen 06/09/23 1515 20 G Anterior;Left;Proximal Forearm 3 days                  Wounds: Yes  Wound care consulted: Yes

## 2023-06-13 NOTE — PROGRESS NOTES
Attempted to see pt for wound care follow up. Per bedside RN, pt is not stable for turn at this time.     Will attempt assessment tomorrow if pt is available.

## 2023-06-13 NOTE — SIGNIFICANT EVENT
02:15 - Patient given Lasix and albumin 25% to facilitate vent weaning and promote normotension (MAP sustained <55). FiO2 weaned to 50%.    03:27 - Daily ABG drawn from arterial line with pO2 50 with FiO2 50%. Increased FiO2 again to 60% and will repeat ABG in one hour.     Marietta Haile PA-C  Neurocritical Care  6/13/2023

## 2023-06-13 NOTE — PROGRESS NOTES
[facility]  Neurocritical Care  Progress Note    Admit Date: 5/28/2023  Service Date: 06/13/2023  Length of Stay: 16    Subjective:     Chief Complaint: Non-convulsive status epilepticus    History of Present Illness: Marely Hamilton is a 58 year old female with a medical history significant for EtOH induced hepatic cirrhosis, seizure disorder on outpatient LEV and ZNS and bipolar disorder who was admitted to [facility] on 5/28 as a transfer from OSH for evaluation of persistent encephalopathy concerning for NCSE vs hepatic encephalopathy. History is obtained from chart as patient is unresponsive and unable to assist. Initially presented to [facility] on 5/18 for encephalopathy and thought to be multifactorial including UTI (Proteus mirabilis s/p CTX course), hypercalcemia 2/2 lithium toxicity (Lithium changed to Abilify per Psych), as well as hepatic encephalopathy secondary to medication non compliance. She was treated with lactulose and rifaximin with no improvement in her mental status. EEG showed generalized slowing as well as triphasic discharges in the left hemisphere. MRI Brain [facility] was unremarkable for acute neurologic change. Decision was made to transfer patient to [facility] for higher level of care and ongoing evaluation and management. On arrival, mental status exam has waxed and waned with patient being intermittently verbal and following commands. NH4 48.    NCC is consulted on hospital day 4 for admission after EEG showed frequent left hemispheric electrographic seizures lasting on average 10-30 seconds with prolonged seizures over 1.5 hours also noted. Per chart review, patient home dose of LEV increased from 1000mg to 1500mg BID, ZNS increased to 200mg. Vimpat 150mg BID added per General Neurology (had not been given prior to consult). On initial evaluation, patient is not responsive to voice or pain. Upward gaze noted. Decision made to transfer patient to [facility] for higher level of care and  airway watch.       Hospital Course: 06/02/2023 Tachycardic and hypotensive, transferred for development of mostly right hemispheric status, LOC exam remains poor  Intubated for airway protection, EEG has changed to mostly include triphasics with no definite seizure activity per Dr Boss, propofol stopped and AEDs simplified to keppra 1000mg bid only, wean off pressor, give colloids with crystalloid resuscitation  06/03/2023: remains on persistant significant pressor support despite adequate hydration, problems with third spacing and may have pulmonary hypertension as well, consider trial of stress steroids if hgb stabilizes  06/04/2023: levo down to 0.08mcg, start and advance TFs, vaso at 0.04U, ammonia has been stable, slight rise in tbili, check direct bili, hgb and plts improved, no clear source of bleeding, no source of bleeding on CT abdomen, consider superimposed hemolysis  06/05/2023 NAEON. Neurologic exam continues to improve, following commands in all extremities. Levo 0.02, weaning as able. Vaso discontinued, MAP>65. Daily SBT, monitor and hold TF at midnight for possible extubation tomorrow. Hemolysis work up ongoing, trending CBC. Continue CTX for SBP prophylaxis.   06/06/2023 Awake and following commands. SBT with low tidal volumes. Remains intubated for airway protection at this time with possible extubation tomorrow with continued neurologic improvement. Levo discontinued, maintaining SBP<65. Concern for hemolytic anemia related to autoimmune process vs hepatic dysfunction (elevated reticulocyte count and decreased haptoglobin).  06/07/2023 Rested on AC overnight due to low TV and fatigue. SBT with pressure support 10/5 this am, weaning ventilator as tolerated. Continue tube feeds with goal to optimize nutrition. Ammonia elevated, continue lactulose to encourage bowel movement.   06/08/2023 Failed SBT due to apnea. Some breaths on SIMV. Awake and following commands. Ammonia trending down (44) with  BMs, continue lactulose. Advance nutritional support with goal to increase strength towards extubation. Plt transfusions PRN. Cryo for fibrinogen <100.  06/09/2023 Venous US with R brachial and femoral DVT. Dicussed with Hematology with recommendation to start Argatroban with plts>50. Daily SBT with apnea, maintain on SIMV with backup rate of 10. Hyperammonemia resolved.   06/10/2023 patient is more alert and awake, tolerated SBT. Hematology agreed to switch to heparin for DVT given negative for HIT  06/11/2023   On AM rounds, patient was noted to be hypoxic and tachycardic. Hypoxia resolved with ventilator changes but patient continued to appear uncomfortable. Patient lied flat with noted improvement in oxygenation. Patient became acutely hypotensive, tachycardiac and hypoxic not responsive to ventilator changes. IVF bolus + Burton bump x3 + initiation of vasopressin due to concern for hypovolemic shock. Levophed added due to persistent hypertension. L femoral arterial line and R IJ trialysis placed emergently. STAT Hemoglobin 4.4. Received protamine for heparin reversal, 3u Plts and 3 FFP and 2 pRBC. STAT CTA abdo/pelvis with L retroperitoneal hematoma with + spot sign. IR consulted and patient taken class A for diagnostic angiogram with possible emobolization. Pressors weaned after volume resuscitation. Family updated over phone.   06/12/2023 s/p IR embolization of left lumbar and internal iliac branch foci of arterial extravasation. Hemoglobin stable, continue to trend CBC q8 with transfusion of pRBC for Hg<7. Restart trickle feeds. Albumin and lasix for dieresis. Low threshold for antibiotics given risk of SBP and RP hematoma. Unlikely to tolerate AC, will discuss placement of IVC filter with IR.   06/13/2023 Attempted to wean FiO2 overnight with noted drop of pO2. FiO2 increased to 55% and patient received albumin and was diuresed. Weaning this am with ABGs. Remains on fentanyl 12.5, neurologic exam unchanged. Hg  stable. Post AM rounds, patient with acute increase in O2 needs, STAT CTA chest/abd/pelvis to assess for PE vs further hemorrhage stable and without new findings. Continue current management.       Interval History:  As above    Review of Systems   Unable to perform ROS: Intubated     Objective:     Vitals:  Temp: 98.2 °F (36.8 °C)  Pulse: 80  Rhythm: normal sinus rhythm, sinus tachycardia  BP: (!) 111/56  MAP (mmHg): 81  Resp: 16  SpO2: 95 %  Oxygen Concentration (%): 50  Vent Mode: A/C  Set Rate: 14 BPM  Vt Set: 340 mL  PEEP/CPAP: 8 cmH20  Peak Airway Pressure: 58 cmH20  Mean Airway Pressure: 10 cmH20  Plateau Pressure: 0 cmH20    Temp  Min: 97.6 °F (36.4 °C)  Max: 98.9 °F (37.2 °C)  Pulse  Min: 69  Max: 95  BP  Min: 95/53  Max: 146/65  MAP (mmHg)  Min: 72  Max: 119  Resp  Min: 14  Max: 19  SpO2  Min: 91 %  Max: 97 %  Oxygen Concentration (%)  Min: 50  Max: 100    06/12 0701 - 06/13 0700  In: 1419.9 [I.V.:216.1]  Out: 1800 [Urine:1550]   Unmeasured Output  Urine Occurrence: 1  Stool Occurrence: 1  Emesis Occurrence: 0  Pad Count: 1        Physical Exam  Vitals and nursing note reviewed.   Constitutional:       General: She is not in acute distress.     Appearance: She is ill-appearing.      Comments: Opens eye spontaneously, following commands   HENT:      Head: Normocephalic and atraumatic.   Eyes:      General: Scleral icterus present.      Extraocular Movements: Extraocular movements intact.      Pupils: Pupils are equal, round, and reactive to light.   Cardiovascular:      Rate and Rhythm: Normal rate.   Pulmonary:      Comments:   Intubated and mechanically ventilated  Abdominal:      Palpations: Abdomen is distended but soft  Musculoskeletal:      Cervical back: Neck supple.   Skin:     General: Skin is warm.   Neurological:      Mental Status: She is alert.      Comments:   Alert  Intubated, unable to test orientation  Follows commands x4  Pupils equal, reactive  Face grossly symmetric   Moves all  extremities antigravity and to commands    Medications:  Continuousfentanyl, Last Rate: 12.5 mcg/hr (06/13/23 0901)    ScheduledARIPiprazole, 5 mg, Daily  folic acid, 1 mg, Daily  levetiracetam, 500 mg, BID  pantoprazole, 40 mg, BID  rifAXIMin, 550 mg, BID  thiamine, 100 mg, Daily    PRNaluminum-magnesium hydroxide-simethicone, 30 mL, QID PRN  dextrose 10%, 12.5 g, PRN  dextrose 10%, 25 g, PRN  dextrose, 15 g, PRN  dextrose, 30 g, PRN  fentaNYL, 50 mcg, Q1H PRN  glucagon (human recombinant), 1 mg, PRN  insulin aspart U-100, 1-10 Units, Q6H PRN  labetalol, 10 mg, Q4H PRN  magnesium oxide, 800 mg, PRN  magnesium oxide, 800 mg, PRN  melatonin, 6 mg, Nightly PRN  naloxone, 0.02 mg, PRN  ondansetron, 4 mg, Q8H PRN  potassium bicarbonate, 35 mEq, PRN  potassium bicarbonate, 50 mEq, PRN  potassium bicarbonate, 60 mEq, PRN  potassium, sodium phosphates, 2 packet, PRN  potassium, sodium phosphates, 2 packet, PRN  potassium, sodium phosphates, 2 packet, PRN  simethicone, 1 tablet, QID PRN  sodium chloride 0.9%, 10 mL, Q8H PRN      Today I personally reviewed pertinent medications, imaging, laboratory results, notably: n    Hemoglobin stable    Diet  Diet NPO  Diet NPO          Assessment/Plan:     Neuro  * Non-convulsive status epilepticus  58F with EtOH induced hepatic cirrhosis, seizure disorder on outpatient LEV and ZNS and bipolar disorder admitted on 5/28 for persistent encephalopathy.    - UTI on admit (Proteus mirabilis), now s/p CTX course  - Hypercalcemia 2/2 lithium toxicity (Lithium changed to Abilify per Psych)  - Hepatic encephalopathy 2/2 to medication non compliance, treated with lactulose and rifaximin     MRI Brain [facility] was unremarkable for acute neurologic change  EEG w/ frequent left hemispheric electrographic seizures and NCSE  Repeat EEGs without seizure activity x 24 hours, now resolved    6/11 --> acute hypotension, hypoxia --> hypovolemic shock --> Large R peritoneal hematoma --> IR for class A embo -->  s/p IR embolization of left lumbar and internal iliac branch foci of arterial extravasation  6/13 --> episode of hypoxia and SOB --> CTA PE without evidence of PE, CT C/A/P without new bleed    - Continued admission to NCC  - Q1h neurochecks while in ICU  - Q1h vitals while in ICU  - HOB@30  - Keppra 500mg BID  - Thiamine replacement   - Lactulose, titrate to BM  - MAP>65  - Daily CMP, Mag, Phos - replete electrolytes PRN  - Lipid panel, A1c, Coags reviewed  - Q8 CBC, transfuse PRN  - BID fibrinogen/PT-INR  - Heparin held in setting of acute bleed  - PT/OT/SLP as appropriate  - CM/SW consult for dispo planning    Acute encephalopathy  Multifactorial   See Non-convulsive status epilepticus    Breakthrough seizure  See Non-convulsive status epilepticus    Psychiatric  Bipolar 1 disorder  See Non-convulsive status epilepticus    Alcohol abuse, in remission  See Non-convulsive status epilepticus    Pulmonary  Acute hypoxemic respiratory failure  Intubated today for airway protection  Difficult intubation due to far anterior airway    Cardiac/Vascular  Hemorrhagic shock  See Hypovolemic shock    Hypovolemic shock  6/11 --> acute hypotension, hypoxia --> hypovolemic shock --> Large R peritoneal hematoma --> IR for class A embo   s/p IR embolization of left lumbar and internal iliac branch foci of arterial extravasation    Hypotension (arterial)  Hypotensive 2/2 hypovolemic/hemorrhagic shock on 6/11  CTA with large retroperitoneal hematoma, s/p IR embolization of left lumbar and internal iliac branch foci of arterial extravasationn     Renal/  Hypernatremia  Daily CMP  Improved with free water administration, RESOLVED     Hematology  Coagulopathy  Heme consulted  Thrombocytopenic and Liver disease     Acute deep vein thrombosis (DVT) of brachial vein of right upper extremity  Noted on 6/8  Argatroban per Heme recs - transitioned to heparin 6/10  HELD 6/11 in setting of retroperitoneal bleed  ?IVC filter, IR  consulted    Deep vein thrombosis (DVT) of femoral vein of right lower extremity  Noted on 6/8  Argatroban per Heme recs - transitioned to heparin  HELD 6/11 in setting of hypovolemic shock 2/2 retroperitoneal hemorrhage   ?IVC filter, IR consulted    Thrombocytopenia  Likely secondary to hepatic cirrhosis and development of splenomegaly  Has worsened and associated with hgb drop, transfuse to >50K  No signs of bleeding in GI tract or on CT, hemolysis on labs  Hematology consulted, tested negative for HIT    6/11 --> acute hypotension, hypoxia --> hypovolemic shock --> Large R peritoneal hematoma --> IR for class A embo --> s/p IR embolization of left lumbar and internal iliac branch foci of arterial extravasation    Oncology  Anemia  Chronic    Macrocytic anemia  Follow CBC    Endocrine  Hyperammonemia  Stable on current medical regimen   See Non-convulsive status epilepticus      Severe protein-calorie malnutrition  Nutrition consulted. Most recent weight and BMI monitored-     Measurements:  Wt Readings from Last 1 Encounters:   06/03/23 59.9 kg (132 lb)   Body mass index is 24.14 kg/m².    Patient has been screened and assessed by RD.    Malnutrition Type:  Context: social/environmental circumstances  Level: severe    Malnutrition Characteristic Summary:  Weight Loss (Malnutrition):  (23% x 4 months)  Energy Intake (Malnutrition): less than or equal to 75% for greater than or equal to 1 month  Subcutaneous Fat (Malnutrition): severe depletion (suspecting)  Muscle Mass (Malnutrition): severe depletion  Fluid Accumulation (Malnutrition): other (see comments) (mild-moderate)    Interventions/Recommendations (treatment strategy):  1. Recommend goal of Impact Peptide 1.5 @ 40 ml/hr to provide 1440 kcal, 90 g pro, 739 ml water. Advance/adjust as appropriate. 2. Bolus TF rec: Impact Peptide 1.5 4 cans per day to provide 1500 kcal, 94 g pro, 772 mL water. 3. RD following.    GI  Acute liver failure without hepatic  coma  GI/Hepatology following    Hepatic cirrhosis  Continue Lactulose and Rifaximin   Ammonia elevated but downtrending with bowel reg  Follow coags and fibrinogen      Hepatic encephalopathy  Continue lactulose   See Non-convulsive status epilepticus    Other  Retroperitoneal bleed  6/11 --> acute hypotension, hypoxia --> hypovolemic shock --> Large R peritoneal hematoma --> IR for class A embo --> s/p IR embolization of left lumbar and internal iliac branch foci of arterial extravasation          The patient is being Prophylaxed for:  Venous Thromboembolism with: Mechanical  Stress Ulcer with: PPI  Ventilator Pneumonia with: chlorhexidine oral care    Activity Orders          Elevate HOB flat until bedrest complete starting at 06/11 1641    Diet NPO: NPO starting at 06/01 1055    Turn patient starting at 05/28 2253    Progressive Mobility Protocol (mobilize patient to their highest level of functioning at least twice daily) starting at 05/28 2000        Full Code    Cameron Yadav MD  Neurocritical Care  [facility]

## 2023-06-13 NOTE — SIGNIFICANT EVENT
Continued attempts at weaning ventilator.     04:46AM - Repeat ABG drawn with significant improvement of pO2 from 59 to 83 with FiO2 set at 60% (previously increased from 50%). Appears to have benefit from co-administration of concentrated albumin/Lasix.  05:55AM - Weaning FiO2 to 55%  Ordered repeat ABG for 07:30AM so results will be available to physicians while rounding and further vent adjustments can be made     Latest Reference Range & Units 06/13/23 03:20 06/13/23 04:46   Sample  ARTERIAL ARTERIAL   POC PH 7.35 - 7.45  7.465 (H) 7.467 (H)   POC PCO2 35 - 45 mmHg 31.2 (L) 31.8 (L)   POC PO2 80 - 100 mmHg 59 (LL) 83   POC HCO3 24 - 28 mmol/L 22.4 (L) 23.0 (L)   POC TCO2 23 - 27 mmol/L 23 24   POC SATURATED O2 95 - 100 % 92 (L) 97   Allens Test  N/A N/A   POC BE -2 to 2 mmol/L -1 -1   FiO2  50 60   Vt  340 340   PEEP  8 8   DelSys  Adult Vent Adult Vent   Site  Shima/UAC Shima/UAC   Mode  AC/PRVC AC/PRVC   Rate  16 14   (LL): Data is critically low  (H): Data is abnormally high  (L): Data is abnormally low      Marietta Haile PA-C  Neurocritical Care  6/13/2023

## 2023-06-13 NOTE — NURSING
1030: Pt destating to 92%. When RN asked if pt is having trouble breathing, she nodded yes. NCC team called. Cirilo, DO to bedside to assess. Verbal order for 50mcg bolus of fentanyl.  Ultrasound to bedside for imaging.  EKG completed. CBC, Troponin completed. Placed back on 100% FiO2. RT at bedside to complete ABG. Pt stating at 98%, SBP stable.  WCTM.     1045: CT ordered. Pt transported to CT with MD, RN, RT, and PCT.  Pt on continuous fentanyl gtt. Ambu bag with pt. Continuous cardiac monitoring in place. Pt became restless - per MD Vicenta, bolus 25mcg of fentanyl. Pt tolerated scan and transported back to [facility].      1130: 1 Unit of PRBCs transfused. MAP> 55. WCTM.

## 2023-06-13 NOTE — H&P
See IR consult note dated 6/13/23. Pt with brief episode of hypoxia and CP concerning for PE vs recurrent RP hemorrhage, however CTA c/a/p was negative for PE & active bleeding. Hgb 6.9 down from 7.3, but also with decrease in WBC and plt so may be dilutional. 1 u pRBC currently transfusing. Hypoxia now resolved, she remains on the same vent settings as before. Case discussed with Dr. Pollock and ordering team. Reiterated to ordering team that filter will only be protective against PE 2/2 migration of the femoral DVT, it will not help protect against PE 2/2 migration of the brachial DVT due to the location of the filter in the IVC. Plan to proceed with IVC filter placement with anesthesia monitoring on 6/14/23.    Will proceed with IVC filter placement with anesthesia monitoring on 6/14/23  NPO at midnight, please hold tube feeds  Anticoagulation reviewed  INR/PT reviewed  Please contact via spectra or secure chat with any questions    Heaven Diamond PA-C  Interventional Radiology   Spectra: [phone]

## 2023-06-13 NOTE — ASSESSMENT & PLAN NOTE
Noted on 6/8  Argatroban per Heme recs - transitioned to heparin 6/10  HELD 6/11 in setting of retroperitoneal bleed  ?IVC filter, IR consulted

## 2023-06-13 NOTE — CONSULTS
"Inferior Vena Cava Stent Placement Consult Note  Interventional Radiology    Consult Requested By: Cameron Yadav MD   Reason for Consult: IVC filter     SUBJECTIVE:     Chief Complaint:  IVC filter placement    History of Present Illness:  Marely Hamilton is a 58 y.o. female with a PMHx of EtOH cirrhosis, seizure disorder, and bipolar disorder who was transferred from OSH on 6/1/23 for encephalopathy 2/2 ongoing seizures. Hospital course notable for acute resp failure requiring intubation, acute R nrachial and R femoral DVT requiring argatroban gtt, hemorrhagic shock 2/2 development of a RP hematoma s/p IR angiogram and embolization of L lumbar artery and internal iliac arterial branch. Interventional Radiology has been consulted for IVC filter placement for management of  RUE and RLE DVTs . Pt has had recent imaging including a BLE US on 6/8/23 which revealed "Nonocclusive thrombus in the right proximal femoral vein" and a BUE US on 6/8/23 which revealed "Nonocclusive deep venous thrombus of the right brachial vein". Pt is not a candidate for anticoagulation due to  recent RP bleed . The pt has not undergone IVC filter placement in the past. The pt mildly hypotensive and is not on pressors. She remains intubated. She is currently receiving tube feeds. Of note, pt became acutely SOB and c/o CP when this writer came to see her for bedside exam.     Review of Systems   Reason unable to perform ROS: intubated.   Constitutional: Negative.    HENT: Negative.     Respiratory:  Positive for shortness of breath.    Cardiovascular:  Positive for chest pain.   Gastrointestinal: Negative.    Genitourinary: Negative.    Musculoskeletal: Negative.    Skin: Negative.    Neurological:  Positive for weakness (generalized).     Scheduled Meds:   ARIPiprazole  5 mg Per NG tube Daily    folic acid  1 mg Per NG tube Daily    levetiracetam  500 mg Per OG tube BID    pantoprazole  40 mg Intravenous BID    rifAXIMin  550 mg Per NG " tube BID    thiamine  100 mg Per NG tube Daily     Continuous Infusions:   fentanyl 12.5 mcg/hr (23 0901)     PRN Meds:aluminum-magnesium hydroxide-simethicone, dextrose 10%, dextrose 10%, dextrose, dextrose, [] fentaNYL **FOLLOWED BY** fentaNYL, glucagon (human recombinant), insulin aspart U-100, labetalol, magnesium oxide, magnesium oxide, melatonin, naloxone, ondansetron, potassium bicarbonate, potassium bicarbonate, potassium bicarbonate, potassium, sodium phosphates, potassium, sodium phosphates, potassium, sodium phosphates, simethicone, sodium chloride 0.9%    Review of patient's allergies indicates:   Allergen Reactions    Sulfa (sulfonamide antibiotics) Rash    Codeine Nausea And Vomiting       Past Medical History:   Diagnosis Date    Addiction to drug     Alcohol abuse     Alcohol abuse, in remission 6/15/2015    14.5 weeks ago; AA weekly    Anemia     Anxiety 6/15/2015    Behavioral problem     Bipolar disorder     Bipolar disorder in remission 6/15/2015    Cirrhosis, Laennec's 6/15/2015    Depression     Encounter for blood transfusion     Epistaxis 6/15/2015    Fatigue     Glaucoma     Hematuria     Hepatic encephalopathy 6/15/2015    Hepatic enlargement     History of psychiatric care     History of psychiatric hospitalization     History of seizure 6/15/2015    1    hx of intentional Tylenol overdose 6/15/2015    2005- situational and hx of bipolar    Hx of psychiatric care     Macrocytic anemia 2015    6 units PRBC since 2015    Jeana     Osteoarthritis     Other ascites 6/15/2015    Psychiatric exam requested by authority     Psychiatric problem     Psychosis 2019    Renal disorder     Seizures     Self-harming behavior     Suicide attempt     Therapy     Thrombocytopenia 6/15/2015     Past Surgical History:   Procedure Laterality Date    COSMETIC SURGERY      EMBOLIZATION N/A 2023    Procedure: EMBOLIZATION, BLOOD VESSEL;  Surgeon: Debbie Surgeon;  Location: [facility]  [place];  Service: Anesthesiology;  Laterality: N/A;    ESOPHAGOGASTRODUODENOSCOPY       Family History   Problem Relation Age of Onset    Alcohol abuse Father     Suicide Father     Bipolar disorder Father     Alcohol abuse Sister     Hypertension Mother     Alcohol abuse Paternal Grandfather     Drug abuse Neg Hx     Dementia Neg Hx      Social History     Tobacco Use    Smoking status: Never    Smokeless tobacco: Never   Substance Use Topics    Alcohol use: Not Currently     Comment: hx of ETOH abuse with cirrhosis of liver    Drug use: No       OBJECTIVE:     Vital Signs (Most Recent)  Temp: 98.2 °F (36.8 °C) (06/13/23 0701)  Pulse: 80 (06/13/23 0913)  Resp: 16 (06/13/23 0913)  BP: (!) 111/56 (06/13/23 0913)  SpO2: 95 % (06/13/23 0913)    Physical Exam:  Physical Exam  Vitals and nursing note reviewed.   Constitutional:       General: She is in acute distress.      Appearance: She is ill-appearing.   HENT:      Head: Normocephalic and atraumatic.   Eyes:      Extraocular Movements: Extraocular movements intact.      Conjunctiva/sclera: Conjunctivae normal.      Pupils: Pupils are equal, round, and reactive to light.   Pulmonary:      Comments: Intubated  Increased WOB  Abdominal:      General: Abdomen is flat. There is no distension.      Tenderness: There is no abdominal tenderness.   Skin:     General: Skin is warm and dry.      Coloration: Skin is not jaundiced.   Neurological:      General: No focal deficit present.      Mental Status: She is alert and oriented to person, place, and time.       Laboratory  I have reviewed all pertinent lab results within the past 24 hours.  CBC:   Recent Labs   Lab 06/12/23  2330   WBC 7.84   RBC 2.48*   HGB 7.3*   HCT 22.6*   PLT 68*   MCV 91   MCH 29.4   MCHC 32.3     BMP:   Recent Labs   Lab 06/13/23  0444   *      K 2.8*   *   CO2 23   BUN 28*   CREATININE 0.5   CALCIUM 8.4*   MG 1.7     CMP:   Recent Labs   Lab 06/13/23  0444   *   CALCIUM 8.4*    ALBUMIN 3.0*   PROT 4.2*      K 2.8*   CO2 23   *   BUN 28*   CREATININE 0.5   ALKPHOS 98   ALT 25   AST 37   BILITOT 3.5*     LFTs:   Recent Labs   Lab 06/13/23  0444   ALT 25   AST 37   ALKPHOS 98   BILITOT 3.5*   PROT 4.2*   ALBUMIN 3.0*     Coagulation:   Recent Labs   Lab 06/13/23  0830   LABPROT 19.3*   INR 1.9*   APTT 36.2*     Microbiology Results (last 7 days)       ** No results found for the last 168 hours. **            ASA/Mallampati  ASA: 3  Mallampati: 2    Imaging:  Recent imaging studies including BLE US and BUE US on 6/8/23 which was independently reviewed by Haylie Pollock MD.     ASSESSMENT/PLAN:     Assessment:  58 y.o. female with a PMHx of EtOH cirrhosis, seizure disorder, and bipolar disorder who has been referred to IR for IVC filter placement for management of  RLE and RUE DVTs . The procedure was discussed in great detail with the patient including thorough explanations of the potential risks and benefits of IVC filter placement. Risks include bleeding or infection at the puncture site, allergic reaction to contrast used during the procedure, malposition of the filter, migration of the filter, IVC thrombosis, and damage to the IVC and surrounding structures. Explained to ordering team and pt that filter will only be protective against PE 2/2 migration of the femoral DVT. It will not help protect against PE 2/2 migration of the brachial DVT due to the location of the filter in the IVC. Pt and ordering team verbalized understanding and would like to proceed. The patient is a candidate for IVC filter placement under moderate sedation with anesthesia monitoring, however will hold off at the moment as she is currently acutely decompensating and is undergoing workup for possible PE vs recurrent hemorrhage.     Plan:  Will proceed with IVC filter placement under moderate sedation with anesthesia monitoring once pt stabilizes.   Anticoagulation history reviewed.   Coagulation labs  reviewed.  Thank you for the consult. Please contact with questions via [employer] secure chat or spectra.    Heaven Diamond PA-C  Interventional Radiology  Spectra: [id number]

## 2023-06-13 NOTE — ASSESSMENT & PLAN NOTE
Noted on 6/8  Argatroban per Heme recs - transitioned to heparin  HELD 6/11 in setting of hypovolemic shock 2/2 retroperitoneal hemorrhage   ?IVC filter, IR consulted

## 2023-06-13 NOTE — CARE UPDATE
Weaning patient's FiO2 gradually as tolerated for goal SpO2 >90%. Patient currently tolerating gradual wean to 70% FiO2 thus far without significant change in O2 sats.     Will aim to continue weaning and administer albumin/Lasix if O2 sats do not tolerate wean.     Marietta Haile PA-C  Neurocritical Care  6/12/2023

## 2023-06-13 NOTE — ASSESSMENT & PLAN NOTE
58F with EtOH induced hepatic cirrhosis, seizure disorder on outpatient LEV and ZNS and bipolar disorder admitted on 5/28 for persistent encephalopathy.    - UTI on admit (Proteus mirabilis), now s/p CTX course  - Hypercalcemia 2/2 lithium toxicity (Lithium changed to Abilify per Psych)  - Hepatic encephalopathy 2/2 to medication non compliance, treated with lactulose and rifaximin     MRI Brain WO was unremarkable for acute neurologic change  EEG w/ frequent left hemispheric electrographic seizures and NCSE  Repeat EEGs without seizure activity x 24 hours, now resolved    6/11 --> acute hypotension, hypoxia --> hypovolemic shock --> Large R peritoneal hematoma --> IR for class A embo --> s/p IR embolization of left lumbar and internal iliac branch foci of arterial extravasation  6/13 --> episode of hypoxia and SOB --> CTA PE without evidence of PE, CT C/A/P without new bleed    - Continued admission to [facility]  - Q1h neurochecks while in ICU  - Q1h vitals while in ICU  - HOB@30  - Keppra 500mg BID  - Thiamine replacement   - Lactulose, titrate to BM  - MAP>65  - Daily CMP, Mag, Phos - replete electrolytes PRN  - Lipid panel, A1c, Coags reviewed  - Q8 CBC, transfuse PRN  - BID fibrinogen/PT-INR  - Heparin held in setting of acute bleed  - PT/OT/SLP as appropriate  - CM/SW consult for dispo planning

## 2023-06-14 ENCOUNTER — ANESTHESIA (OUTPATIENT)
Dept: INTERVENTIONAL RADIOLOGY/VASCULAR | Facility: HOSPITAL | Age: 58
DRG: 981 | End: 2023-06-14
Payer: MEDICARE

## 2023-06-14 LAB
ABO + RH BLD: NORMAL
ALBUMIN SERPL BCP-MCNC: 2.4 G/DL (ref 3.5–5.2)
ALBUMIN SERPL BCP-MCNC: 2.7 G/DL (ref 3.5–5.2)
ALLENS TEST: ABNORMAL
ALP SERPL-CCNC: 113 U/L (ref 55–135)
ALP SERPL-CCNC: 116 U/L (ref 55–135)
ALT SERPL W/O P-5'-P-CCNC: 23 U/L (ref 10–44)
ALT SERPL W/O P-5'-P-CCNC: 24 U/L (ref 10–44)
ANION GAP SERPL CALC-SCNC: 5 MMOL/L (ref 8–16)
APTT PPP: 29.6 SEC (ref 21–32)
APTT PPP: 35.7 SEC (ref 21–32)
AST SERPL-CCNC: 31 U/L (ref 10–40)
AST SERPL-CCNC: 34 U/L (ref 10–40)
BASOPHILS # BLD AUTO: 0.01 K/UL (ref 0–0.2)
BASOPHILS # BLD AUTO: 0.02 K/UL (ref 0–0.2)
BASOPHILS NFR BLD: 0.2 % (ref 0–1.9)
BASOPHILS NFR BLD: 0.5 % (ref 0–1.9)
BILIRUB DIRECT SERPL-MCNC: 1.7 MG/DL (ref 0.1–0.3)
BILIRUB SERPL-MCNC: 3.4 MG/DL (ref 0.1–1)
BILIRUB SERPL-MCNC: 5 MG/DL (ref 0.1–1)
BLD GP AB SCN CELLS X3 SERPL QL: NORMAL
BLD PROD TYP BPU: NORMAL
BLOOD UNIT EXPIRATION DATE: NORMAL
BLOOD UNIT TYPE CODE: 5100
BLOOD UNIT TYPE CODE: 600
BLOOD UNIT TYPE CODE: 6200
BLOOD UNIT TYPE: NORMAL
BUN SERPL-MCNC: 24 MG/DL (ref 6–20)
CALCIUM SERPL-MCNC: 8.1 MG/DL (ref 8.7–10.5)
CHLORIDE SERPL-SCNC: 114 MMOL/L (ref 95–110)
CO2 SERPL-SCNC: 25 MMOL/L (ref 23–29)
CODING SYSTEM: NORMAL
CREAT SERPL-MCNC: 0.4 MG/DL (ref 0.5–1.4)
CROSSMATCH INTERPRETATION: NORMAL
DELSYS: ABNORMAL
DIFFERENTIAL METHOD: ABNORMAL
DIFFERENTIAL METHOD: ABNORMAL
DISPENSE STATUS: NORMAL
EOSINOPHIL # BLD AUTO: 0.1 K/UL (ref 0–0.5)
EOSINOPHIL # BLD AUTO: 0.1 K/UL (ref 0–0.5)
EOSINOPHIL NFR BLD: 1.7 % (ref 0–8)
EOSINOPHIL NFR BLD: 2.1 % (ref 0–8)
ERYTHROCYTE [DISTWIDTH] IN BLOOD BY AUTOMATED COUNT: 18.2 % (ref 11.5–14.5)
ERYTHROCYTE [DISTWIDTH] IN BLOOD BY AUTOMATED COUNT: 18.6 % (ref 11.5–14.5)
ERYTHROCYTE [SEDIMENTATION RATE] IN BLOOD BY WESTERGREN METHOD: 14 MM/H
EST. GFR  (NO RACE VARIABLE): >60 ML/MIN/1.73 M^2
FIBRINOGEN PPP-MCNC: 117 MG/DL (ref 182–400)
FIBRINOGEN PPP-MCNC: 156 MG/DL (ref 182–400)
FIO2: 50
GLUCOSE SERPL-MCNC: 156 MG/DL (ref 70–110)
HCO3 UR-SCNC: 25.7 MMOL/L (ref 24–28)
HCT VFR BLD AUTO: 21.8 % (ref 37–48.5)
HCT VFR BLD AUTO: 22.4 % (ref 37–48.5)
HGB BLD-MCNC: 7.1 G/DL (ref 12–16)
HGB BLD-MCNC: 7.4 G/DL (ref 12–16)
IMM GRANULOCYTES # BLD AUTO: 0.02 K/UL (ref 0–0.04)
IMM GRANULOCYTES # BLD AUTO: 0.03 K/UL (ref 0–0.04)
IMM GRANULOCYTES NFR BLD AUTO: 0.5 % (ref 0–0.5)
IMM GRANULOCYTES NFR BLD AUTO: 0.7 % (ref 0–0.5)
INR PPP: 1.6 (ref 0.8–1.2)
INR PPP: 1.8 (ref 0.8–1.2)
LYMPHOCYTES # BLD AUTO: 0.6 K/UL (ref 1–4.8)
LYMPHOCYTES # BLD AUTO: 0.7 K/UL (ref 1–4.8)
LYMPHOCYTES NFR BLD: 15 % (ref 18–48)
LYMPHOCYTES NFR BLD: 16.5 % (ref 18–48)
MAGNESIUM SERPL-MCNC: 1.7 MG/DL (ref 1.6–2.6)
MCH RBC QN AUTO: 29.3 PG (ref 27–31)
MCH RBC QN AUTO: 30 PG (ref 27–31)
MCHC RBC AUTO-ENTMCNC: 32.6 G/DL (ref 32–36)
MCHC RBC AUTO-ENTMCNC: 33 G/DL (ref 32–36)
MCV RBC AUTO: 90 FL (ref 82–98)
MCV RBC AUTO: 91 FL (ref 82–98)
MODE: ABNORMAL
MONOCYTES # BLD AUTO: 0.5 K/UL (ref 0.3–1)
MONOCYTES # BLD AUTO: 0.5 K/UL (ref 0.3–1)
MONOCYTES NFR BLD: 11 % (ref 4–15)
MONOCYTES NFR BLD: 11.3 % (ref 4–15)
NEUTROPHILS # BLD AUTO: 2.9 K/UL (ref 1.8–7.7)
NEUTROPHILS # BLD AUTO: 3 K/UL (ref 1.8–7.7)
NEUTROPHILS NFR BLD: 69.4 % (ref 38–73)
NEUTROPHILS NFR BLD: 71.1 % (ref 38–73)
NRBC BLD-RTO: 0 /100 WBC
NRBC BLD-RTO: 1 /100 WBC
NUM UNITS TRANS FFP: NORMAL
NUM UNITS TRANS FFP: NORMAL
NUM UNITS TRANS PACKED RBC: NORMAL
NUM UNITS TRANS PACKED RBC: NORMAL
PCO2 BLDA: 33.3 MMHG (ref 35–45)
PEEP: 8
PH SMN: 7.49 [PH] (ref 7.35–7.45)
PHOSPHATE SERPL-MCNC: 2.3 MG/DL (ref 2.7–4.5)
PIP: 23
PLATELET # BLD AUTO: 40 K/UL (ref 150–450)
PLATELET # BLD AUTO: 44 K/UL (ref 150–450)
PMV BLD AUTO: 10.2 FL (ref 9.2–12.9)
PMV BLD AUTO: 10.6 FL (ref 9.2–12.9)
PO2 BLDA: 97 MMHG (ref 80–100)
POC BE: 2 MMOL/L
POC SATURATED O2: 98 % (ref 95–100)
POC TCO2: 27 MMOL/L (ref 23–27)
POCT GLUCOSE: 145 MG/DL (ref 70–110)
POTASSIUM SERPL-SCNC: 3.9 MMOL/L (ref 3.5–5.1)
PROT SERPL-MCNC: 3.9 G/DL (ref 6–8.4)
PROT SERPL-MCNC: 4.7 G/DL (ref 6–8.4)
PROTHROMBIN TIME: 16.4 SEC (ref 9–12.5)
PROTHROMBIN TIME: 18.5 SEC (ref 9–12.5)
RBC # BLD AUTO: 2.42 M/UL (ref 4–5.4)
RBC # BLD AUTO: 2.47 M/UL (ref 4–5.4)
SAMPLE: ABNORMAL
SITE: ABNORMAL
SODIUM SERPL-SCNC: 144 MMOL/L (ref 136–145)
SP02: 100
SPECIMEN OUTDATE: NORMAL
UNIT NUMBER: NORMAL
VT: 340
WBC # BLD AUTO: 4.07 K/UL (ref 3.9–12.7)
WBC # BLD AUTO: 4.36 K/UL (ref 3.9–12.7)

## 2023-06-14 PROCEDURE — 20000000 HC ICU ROOM

## 2023-06-14 PROCEDURE — 83735 ASSAY OF MAGNESIUM: CPT | Performed by: PHYSICIAN ASSISTANT

## 2023-06-14 PROCEDURE — 82803 BLOOD GASES ANY COMBINATION: CPT

## 2023-06-14 PROCEDURE — 99291 PR CRITICAL CARE, E/M 30-74 MINUTES: ICD-10-PCS | Mod: GC,,, | Performed by: PSYCHIATRY & NEUROLOGY

## 2023-06-14 PROCEDURE — 84100 ASSAY OF PHOSPHORUS: CPT | Performed by: PHYSICIAN ASSISTANT

## 2023-06-14 PROCEDURE — 25500020 PHARM REV CODE 255: Performed by: PSYCHIATRY & NEUROLOGY

## 2023-06-14 PROCEDURE — D9220A PRA ANESTHESIA: Mod: CRNA,,, | Performed by: NURSE ANESTHETIST, CERTIFIED REGISTERED

## 2023-06-14 PROCEDURE — 85384 FIBRINOGEN ACTIVITY: CPT | Mod: 91 | Performed by: STUDENT IN AN ORGANIZED HEALTH CARE EDUCATION/TRAINING PROGRAM

## 2023-06-14 PROCEDURE — 27000221 HC OXYGEN, UP TO 24 HOURS

## 2023-06-14 PROCEDURE — 63600175 PHARM REV CODE 636 W HCPCS: Performed by: NURSE ANESTHETIST, CERTIFIED REGISTERED

## 2023-06-14 PROCEDURE — 36430 TRANSFUSION BLD/BLD COMPNT: CPT

## 2023-06-14 PROCEDURE — 86900 BLOOD TYPING SEROLOGIC ABO: CPT | Performed by: PSYCHIATRY & NEUROLOGY

## 2023-06-14 PROCEDURE — 25000003 PHARM REV CODE 250: Performed by: NURSE ANESTHETIST, CERTIFIED REGISTERED

## 2023-06-14 PROCEDURE — 85730 THROMBOPLASTIN TIME PARTIAL: CPT | Performed by: PSYCHIATRY & NEUROLOGY

## 2023-06-14 PROCEDURE — 99291 CRITICAL CARE FIRST HOUR: CPT | Mod: GC,,, | Performed by: PSYCHIATRY & NEUROLOGY

## 2023-06-14 PROCEDURE — P9035 PLATELET PHERES LEUKOREDUCED: HCPCS | Performed by: STUDENT IN AN ORGANIZED HEALTH CARE EDUCATION/TRAINING PROGRAM

## 2023-06-14 PROCEDURE — 25000003 PHARM REV CODE 250: Performed by: STUDENT IN AN ORGANIZED HEALTH CARE EDUCATION/TRAINING PROGRAM

## 2023-06-14 PROCEDURE — 94761 N-INVAS EAR/PLS OXIMETRY MLT: CPT

## 2023-06-14 PROCEDURE — 99900026 HC AIRWAY MAINTENANCE (STAT)

## 2023-06-14 PROCEDURE — D9220A PRA ANESTHESIA: ICD-10-PCS | Mod: CRNA,,, | Performed by: NURSE ANESTHETIST, CERTIFIED REGISTERED

## 2023-06-14 PROCEDURE — 80076 HEPATIC FUNCTION PANEL: CPT | Performed by: NURSE PRACTITIONER

## 2023-06-14 PROCEDURE — 94003 VENT MGMT INPAT SUBQ DAY: CPT

## 2023-06-14 PROCEDURE — 63600175 PHARM REV CODE 636 W HCPCS: Performed by: STUDENT IN AN ORGANIZED HEALTH CARE EDUCATION/TRAINING PROGRAM

## 2023-06-14 PROCEDURE — 27200966 HC CLOSED SUCTION SYSTEM

## 2023-06-14 PROCEDURE — D9220A PRA ANESTHESIA: Mod: ANES,,, | Performed by: ANESTHESIOLOGY

## 2023-06-14 PROCEDURE — 99900035 HC TECH TIME PER 15 MIN (STAT)

## 2023-06-14 PROCEDURE — 85025 COMPLETE CBC W/AUTO DIFF WBC: CPT | Mod: 91 | Performed by: PHYSICIAN ASSISTANT

## 2023-06-14 PROCEDURE — 85610 PROTHROMBIN TIME: CPT | Performed by: STUDENT IN AN ORGANIZED HEALTH CARE EDUCATION/TRAINING PROGRAM

## 2023-06-14 PROCEDURE — C9113 INJ PANTOPRAZOLE SODIUM, VIA: HCPCS | Performed by: STUDENT IN AN ORGANIZED HEALTH CARE EDUCATION/TRAINING PROGRAM

## 2023-06-14 PROCEDURE — 37799 UNLISTED PX VASCULAR SURGERY: CPT

## 2023-06-14 PROCEDURE — 80053 COMPREHEN METABOLIC PANEL: CPT | Performed by: EMERGENCY MEDICINE

## 2023-06-14 PROCEDURE — 85025 COMPLETE CBC W/AUTO DIFF WBC: CPT | Performed by: EMERGENCY MEDICINE

## 2023-06-14 PROCEDURE — D9220A PRA ANESTHESIA: ICD-10-PCS | Mod: ANES,,, | Performed by: ANESTHESIOLOGY

## 2023-06-14 RX ORDER — PHENYLEPHRINE HYDROCHLORIDE 10 MG/ML
INJECTION INTRAVENOUS
Status: DISCONTINUED | OUTPATIENT
Start: 2023-06-14 | End: 2023-06-14

## 2023-06-14 RX ORDER — PROPOFOL 10 MG/ML
VIAL (ML) INTRAVENOUS
Status: DISCONTINUED | OUTPATIENT
Start: 2023-06-14 | End: 2023-06-14

## 2023-06-14 RX ORDER — ROCURONIUM BROMIDE 10 MG/ML
INJECTION, SOLUTION INTRAVENOUS
Status: DISCONTINUED | OUTPATIENT
Start: 2023-06-14 | End: 2023-06-14

## 2023-06-14 RX ORDER — HYDROCODONE BITARTRATE AND ACETAMINOPHEN 500; 5 MG/1; MG/1
TABLET ORAL
Status: DISCONTINUED | OUTPATIENT
Start: 2023-06-14 | End: 2023-06-17

## 2023-06-14 RX ORDER — FENTANYL CITRATE 50 UG/ML
INJECTION, SOLUTION INTRAMUSCULAR; INTRAVENOUS
Status: DISCONTINUED | OUTPATIENT
Start: 2023-06-14 | End: 2023-06-14

## 2023-06-14 RX ORDER — OLANZAPINE 5 MG/1
5 TABLET ORAL NIGHTLY
Status: DISCONTINUED | OUTPATIENT
Start: 2023-06-14 | End: 2023-06-15

## 2023-06-14 RX ADMIN — PROPOFOL 30 MG: 10 INJECTION, EMULSION INTRAVENOUS at 04:06

## 2023-06-14 RX ADMIN — Medication 100 MG: at 08:06

## 2023-06-14 RX ADMIN — PROPOFOL 30 MG: 10 INJECTION, EMULSION INTRAVENOUS at 05:06

## 2023-06-14 RX ADMIN — RIFAXIMIN 550 MG: 550 TABLET ORAL at 09:06

## 2023-06-14 RX ADMIN — LEVETIRACETAM 500 MG: 500 SOLUTION ORAL at 09:06

## 2023-06-14 RX ADMIN — PHENYLEPHRINE HYDROCHLORIDE 100 MCG: 10 INJECTION INTRAVENOUS at 05:06

## 2023-06-14 RX ADMIN — LEVETIRACETAM 500 MG: 500 SOLUTION ORAL at 08:06

## 2023-06-14 RX ADMIN — SODIUM CHLORIDE, SODIUM ACETATE ANHYDROUS, SODIUM GLUCONATE, POTASSIUM CHLORIDE, AND MAGNESIUM CHLORIDE: 526; 222; 502; 37; 30 INJECTION, SOLUTION INTRAVENOUS at 04:06

## 2023-06-14 RX ADMIN — PANTOPRAZOLE SODIUM 40 MG: 40 INJECTION, POWDER, FOR SOLUTION INTRAVENOUS at 08:06

## 2023-06-14 RX ADMIN — PANTOPRAZOLE SODIUM 40 MG: 40 INJECTION, POWDER, FOR SOLUTION INTRAVENOUS at 09:06

## 2023-06-14 RX ADMIN — OLANZAPINE 5 MG: 5 TABLET, FILM COATED ORAL at 09:06

## 2023-06-14 RX ADMIN — SUGAMMADEX 100 MG: 100 INJECTION, SOLUTION INTRAVENOUS at 05:06

## 2023-06-14 RX ADMIN — Medication 6 MG: at 09:06

## 2023-06-14 RX ADMIN — ROCURONIUM BROMIDE 30 MG: 10 INJECTION INTRAVENOUS at 04:06

## 2023-06-14 RX ADMIN — ROCURONIUM BROMIDE 20 MG: 10 INJECTION INTRAVENOUS at 04:06

## 2023-06-14 RX ADMIN — ARIPIPRAZOLE 5 MG: 5 TABLET ORAL at 08:06

## 2023-06-14 RX ADMIN — FENTANYL CITRATE 100 MCG: 0.05 INJECTION, SOLUTION INTRAMUSCULAR; INTRAVENOUS at 04:06

## 2023-06-14 RX ADMIN — FOLIC ACID 1 MG: 1 TABLET ORAL at 08:06

## 2023-06-14 RX ADMIN — PROPOFOL 20 MG: 10 INJECTION, EMULSION INTRAVENOUS at 04:06

## 2023-06-14 RX ADMIN — RIFAXIMIN 550 MG: 550 TABLET ORAL at 08:06

## 2023-06-14 RX ADMIN — IOHEXOL 20 ML: 300 INJECTION, SOLUTION INTRAVENOUS at 05:06

## 2023-06-14 NOTE — PROGRESS NOTES
[facility] - Neuro Critical Care  Wound Care    Patient Name:  Marely Hamilton   MRN:  [id number]  Date: 6/14/2023  Diagnosis: Non-convulsive status epilepticus    History:     Past Medical History:   Diagnosis Date    Addiction to drug     Alcohol abuse     Alcohol abuse, in remission 6/15/2015    14.5 weeks ago; AA weekly    Anemia     Anxiety 6/15/2015    Behavioral problem     Bipolar disorder     Bipolar disorder in remission 6/15/2015    Cirrhosis, Laennec's 6/15/2015    Depression     Encounter for blood transfusion     Epistaxis 6/15/2015    Fatigue     Glaucoma     Hematuria     Hepatic encephalopathy 6/15/2015    Hepatic enlargement     History of psychiatric care     History of psychiatric hospitalization     History of seizure 6/15/2015    1    hx of intentional Tylenol overdose 6/15/2015    2005- situational and hx of bipolar    Hx of psychiatric care     Macrocytic anemia 9/18/2015    6 units PRBC since June 2015    Jeana     Osteoarthritis     Other ascites 6/15/2015    Psychiatric exam requested by authority     Psychiatric problem     Psychosis 9/26/2019    Renal disorder     Seizures     Self-harming behavior     Suicide attempt     Therapy     Thrombocytopenia 6/15/2015       Social History     Socioeconomic History    Marital status: Single   Occupational History    Occupation: Disabled     Employer: DISABLED   Tobacco Use    Smoking status: Never    Smokeless tobacco: Never   Substance and Sexual Activity    Alcohol use: Not Currently     Comment: hx of ETOH abuse with cirrhosis of liver    Drug use: No    Sexual activity: Not Currently   Other Topics Concern    Patient feels they ought to cut down on drinking/drug use No    Patient annoyed by others criticizing their drinking/drug use No    Patient has felt bad or guilty about drinking/drug use No    Patient has had a drink/used drugs as an eye opener in the AM No   Social History Narrative    Pt has 1 older and 1 younger sister.  Her father  committed suicide when she was 18.  She has a [employer] degree and worked as an , but she has been disabled since age 40.  She never  and has no children.  She is not dating and claims no current friends.  She currently lives with her mother along with her pet dog.  She denies any hobbies and says she is not [employer].     Social Determinants of Health     Financial Resource Strain: Unknown    Difficulty of Paying Living Expenses: Patient refused   Food Insecurity: Unknown    Worried About Running Out of Food in the Last Year: Patient refused    Ran Out of Food in the Last Year: Patient refused   Transportation Needs: Unknown    Lack of Transportation (Medical): Patient refused    Lack of Transportation (Non-Medical): Patient refused   Physical Activity: Unknown    Days of Exercise per Week: Patient refused    Minutes of Exercise per Session: Patient refused   Stress: Unknown    Feeling of Stress : Patient refused   Social Connections: Unknown    Frequency of Communication with Friends and Family: Patient refused    Frequency of Social Gatherings with Friends and Family: Patient refused    Attends [employer] Services: Patient refused    Active Member of Clubs or Organizations: Patient refused    Attends Club or Organization Meetings: Patient refused    Marital Status: Patient refused   Housing Stability: Unknown    Unable to Pay for Housing in the Last Year: Patient refused    Number of Places Lived in the Last Year: 1    Unstable Housing in the Last Year: Patient refused       Precautions:     Allergies as of 05/27/2023 - Reviewed 05/21/2023   Allergen Reaction Noted    Sulfa (sulfonamide antibiotics) Rash 11/26/2014    Codeine Nausea And Vomiting 04/26/2018       [facility] Assessment Details/Treatment     Patient seen for wound care follow up of posterior thighs, groin.   Reviewed chart for this encounter.   See Flow Sheet for findings.    Pt found lying in bed, agreeable to care at this time. On assessment, pts  groin remains pink/red and moist - intact. Pt turned for assessment of posterior thighs and sacrum - sacrum remains intact. Posterior thighs w/ continued MASD - smaller areas of partial thickness skin loss noted, Triad applied.    RECOMMENDATIONS:  Continue w/ Triad as ordered.    Discussed POC with patient and primary RN.   See EMR for orders & patient education.      Nursing to continue care.  Nursing to maintain pressure injury prevention interventions.           06/14/23 1129   WOCN Assessment   WOCN Total Time (mins) 10   Visit Date 06/14/23   Visit Time 1129   Consult Type Follow Up   WOCN Speciality Wound   Intervention assessed;changed;applied;chart review;coordination of care   Teaching on-going        Altered Skin Integrity 06/03/23 1300  Groin Moisture associated dermatitis   Date First Assessed/Time First Assessed: 06/03/23 1300   Altered Skin Integrity Present on Admission - Did Patient arrive to the hospital with altered skin?: yes  Side: (c)   Location: Groin  Is this injury device related?: No  Primary Wound Type: Bobby...   Dressing Appearance Open to air   Drainage Amount None   Drainage Characteristics/Odor No odor   Appearance Pink;Red;Intact;Moist   Tissue loss description Not applicable        Altered Skin Integrity 06/05/23 1154 Left posterior Greater trochanter Moisture associated dermatitis   Date First Assessed/Time First Assessed: 06/05/23 1154   Altered Skin Integrity Present on Admission - Did Patient arrive to the hospital with altered skin?: suspected hospital acquired  Side: Left  Orientation: posterior  Location: Greater trochanter...   Dressing Appearance Open to air   Drainage Amount None   Drainage Characteristics/Odor No odor   Appearance Pink;Red;Moist   Tissue loss description Partial thickness   Periwound Area Intact;[place];Moist   Care Applied:;Skin Barrier        Altered Skin Integrity 06/05/23 1154 Right posterior Greater trochanter Moisture associated dermatitis   Date First  Assessed/Time First Assessed: 06/05/23 1154   Altered Skin Integrity Present on Admission - Did Patient arrive to the hospital with altered skin?: suspected hospital acquired  Side: Right  Orientation: posterior  Location: Greater trochante...   Dressing Appearance Open to air   Drainage Amount None   Drainage Characteristics/Odor No odor   Appearance Pink;Red;Moist   Tissue loss description Partial thickness   Periwound Area Intact;[place];Moist   Care Applied:;Skin Barrier

## 2023-06-14 NOTE — ASSESSMENT & PLAN NOTE
Noted on 6/8  Argatroban per Heme recs - transitioned to heparin  HELD 6/11 in setting of hypovolemic shock 2/2 retroperitoneal hemorrhage   IVC filter placement pending per IR

## 2023-06-14 NOTE — RESPIRATORY THERAPY
Patient arrived back from IR on transport ventilation.  Placed patient back on MV set on documented settings.  Will continue to monitor.

## 2023-06-14 NOTE — PROGRESS NOTES
[facility] - Neuro Critical Care  Neurocritical Care  Progress Note    Admit Date: 5/28/2023  Service Date: 06/14/2023  Length of Stay: 17    Subjective:     Chief Complaint: Non-convulsive status epilepticus    History of Present Illness: Marely Hamilton is a 58 year old female with a medical history significant for EtOH induced hepatic cirrhosis, seizure disorder on outpatient LEV and ZNS and bipolar disorder who was admitted to [facility] on 5/28 as a transfer from OSH for evaluation of persistent encephalopathy concerning for NCSE vs hepatic encephalopathy. History is obtained from chart as patient is unresponsive and unable to assist. Initially presented to [facility] on 5/18 for encephalopathy and thought to be multifactorial including UTI (Proteus mirabilis s/p CTX course), hypercalcemia 2/2 lithium toxicity (Lithium changed to Abilify per Psych), as well as hepatic encephalopathy secondary to medication non compliance. She was treated with lactulose and rifaximin with no improvement in her mental status. EEG showed generalized slowing as well as triphasic discharges in the left hemisphere. MRI Brain WO was unremarkable for acute neurologic change. Decision was made to transfer patient to [facility] for higher level of care and ongoing evaluation and management. On arrival, mental status exam has waxed and waned with patient being intermittently verbal and following commands. NH4 48.    NCC is consulted on hospital day 4 for admission after EEG showed frequent left hemispheric electrographic seizures lasting on average 10-30 seconds with prolonged seizures over 1.5 hours also noted. Per chart review, patient home dose of LEV increased from 1000mg to 1500mg BID, ZNS increased to 200mg. Vimpat 150mg BID added per General Neurology (had not been given prior to consult). On initial evaluation, patient is not responsive to voice or pain. Upward gaze noted. Decision made to transfer patient to [facility] for higher level of care and  airway watch.       Hospital Course: 06/02/2023 Tachycardic and hypotensive, transferred for development of mostly right hemispheric status, LOC exam remains poor  Intubated for airway protection, EEG has changed to mostly include triphasics with no definite seizure activity per Dr Boss, propofol stopped and AEDs simplified to keppra 1000mg bid only, wean off pressor, give colloids with crystalloid resuscitation  06/03/2023: remains on persistant significant pressor support despite adequate hydration, problems with third spacing and may have pulmonary hypertension as well, consider trial of stress steroids if hgb stabilizes  06/04/2023: levo down to 0.08mcg, start and advance TFs, vaso at 0.04U, ammonia has been stable, slight rise in tbili, check direct bili, hgb and plts improved, no clear source of bleeding, no source of bleeding on CT abdomen, consider superimposed hemolysis  06/05/2023 NAEON. Neurologic exam continues to improve, following commands in all extremities. Levo 0.02, weaning as able. Vaso discontinued, MAP>65. Daily SBT, monitor and hold TF at midnight for possible extubation tomorrow. Hemolysis work up ongoing, trending CBC. Continue CTX for SBP prophylaxis.   06/06/2023 Awake and following commands. SBT with low tidal volumes. Remains intubated for airway protection at this time with possible extubation tomorrow with continued neurologic improvement. Levo discontinued, maintaining SBP<65. Concern for hemolytic anemia related to autoimmune process vs hepatic dysfunction (elevated reticulocyte count and decreased haptoglobin).  06/07/2023 Rested on AC overnight due to low TV and fatigue. SBT with pressure support 10/5 this am, weaning ventilator as tolerated. Continue tube feeds with goal to optimize nutrition. Ammonia elevated, continue lactulose to encourage bowel movement.   06/08/2023 Failed SBT due to apnea. Some breaths on SIMV. Awake and following commands. Ammonia trending down (44) with  BMs, continue lactulose. Advance nutritional support with goal to increase strength towards extubation. Plt transfusions PRN. Cryo for fibrinogen <100.  06/09/2023 Venous US with R brachial and femoral DVT. Dicussed with Hematology with recommendation to start Argatroban with plts>50. Daily SBT with apnea, maintain on SIMV with backup rate of 10. Hyperammonemia resolved.   06/10/2023 patient is more alert and awake, tolerated SBT. Hematology agreed to switch to heparin for DVT given negative for HIT  06/11/2023   On AM rounds, patient was noted to be hypoxic and tachycardic. Hypoxia resolved with ventilator changes but patient continued to appear uncomfortable. Patient lied flat with noted improvement in oxygenation. Patient became acutely hypotensive, tachycardiac and hypoxic not responsive to ventilator changes. IVF bolus + Burton bump x3 + initiation of vasopressin due to concern for hypovolemic shock. Levophed added due to persistent hypertension. L femoral arterial line and R IJ trialysis placed emergently. STAT Hemoglobin 4.4. Received protamine for heparin reversal, 3u Plts and 3 FFP and 2 pRBC. STAT CTA abdo/pelvis with L retroperitoneal hematoma with + spot sign. IR consulted and patient taken class A for diagnostic angiogram with possible emobolization. Pressors weaned after volume resuscitation. Family updated over phone.   06/12/2023 s/p IR embolization of left lumbar and internal iliac branch foci of arterial extravasation. Hemoglobin stable, continue to trend CBC q8 with transfusion of pRBC for Hg<7. Restart trickle feeds. Albumin and lasix for dieresis. Low threshold for antibiotics given risk of SBP and RP hematoma. Unlikely to tolerate AC, will discuss placement of IVC filter with IR.   06/13/2023 Attempted to wean FiO2 overnight with noted drop of pO2. FiO2 increased to 55% and patient received albumin and was diuresed. Weaning this am with ABGs. Remains on fentanyl 12.5, neurologic exam unchanged. Hg  stable. Post AM rounds, patient with acute increase in O2 needs, STAT CTA chest/abd/pelvis to assess for PE vs further hemorrhage stable and without new findings. Continue current management.   06/14/2023 NAEON. Hg stable at 7.6. Plt 44, transfuse for <50. Pending IVC filter per IR for DVT burden with contraindications to AC.      Interval History:  As above    Review of Systems   Unable to perform ROS: Intubated     Objective:     Vitals:  Pulse: 86  Rhythm: normal sinus rhythm  BP: (!) 117/55  MAP (mmHg): 79  Resp: 14  SpO2: 100 %  Oxygen Concentration (%): 50  Vent Mode: A/C  Set Rate: 14 BPM  Vt Set: 340 mL  PEEP/CPAP: 8 cmH20  Peak Airway Pressure: 24 cmH20  Mean Airway Pressure: 10 cmH20  Plateau Pressure: 0 cmH20    Temp  Min: 98 °F (36.7 °C)  Max: 98.9 °F (37.2 °C)  Pulse  Min: 76  Max: 99  BP  Min: 103/46  Max: 151/97  MAP (mmHg)  Min: 73  Max: 93  Resp  Min: 14  Max: 23  SpO2  Min: 91 %  Max: 100 %  Oxygen Concentration (%)  Min: 50  Max: 100    06/13 0701 - 06/14 0700  In: 570.4 [I.V.:40.4]  Out: 1500 [Urine:1500]   Unmeasured Output  Urine Occurrence: 1  Stool Occurrence: 1  Emesis Occurrence: 0  Pad Count: 1        Physical Exam  Vitals and nursing note reviewed.   Constitutional:       General: She is not in acute distress.     Appearance: She is ill-appearing.      Comments: Opens eye spontaneously, following commands   HENT:      Head: Normocephalic and atraumatic.   Eyes:      General: Scleral icterus present.      Extraocular Movements: Extraocular movements intact.      Pupils: Pupils are equal, round, and reactive to light.   Cardiovascular:      Rate and Rhythm: Normal rate.   Pulmonary:      Comments:   Intubated and mechanically ventilated  Abdominal:      Palpations: Abdomen is distended but soft  Musculoskeletal:      Cervical back: Neck supple.   Skin:     General: Skin is warm.   Neurological:      Mental Status: She is alert.      Comments:   Alert  Intubated, unable to test orientation  but nods appropriately   Follows commands x4  Pupils equal, reactive  Face grossly symmetric   Moves all extremities antigravity and to commands    Medications:  Continuousfentanyl, Last Rate: 12.5 mcg/hr (06/13/23 1029)    ScheduledARIPiprazole, 5 mg, Daily  folic acid, 1 mg, Daily  levetiracetam, 500 mg, BID  pantoprazole, 40 mg, BID  rifAXIMin, 550 mg, BID  thiamine, 100 mg, Daily    PRNaluminum-magnesium hydroxide-simethicone, 30 mL, QID PRN  dextrose 10%, 12.5 g, PRN  dextrose 10%, 25 g, PRN  dextrose, 15 g, PRN  dextrose, 30 g, PRN  fentaNYL, 50 mcg, Q1H PRN  glucagon (human recombinant), 1 mg, PRN  insulin aspart U-100, 1-10 Units, Q6H PRN  labetalol, 10 mg, Q4H PRN  magnesium oxide, 800 mg, PRN  magnesium oxide, 800 mg, PRN  melatonin, 6 mg, Nightly PRN  naloxone, 0.02 mg, PRN  ondansetron, 4 mg, Q8H PRN  potassium bicarbonate, 35 mEq, PRN  potassium bicarbonate, 50 mEq, PRN  potassium bicarbonate, 60 mEq, PRN  potassium, sodium phosphates, 2 packet, PRN  potassium, sodium phosphates, 2 packet, PRN  potassium, sodium phosphates, 2 packet, PRN  simethicone, 1 tablet, QID PRN  sodium chloride 0.9%, 10 mL, Q8H PRN      Today I personally reviewed pertinent medications, imaging, laboratory results, notably: n    Hemoglobin stable, 7.6  Plt 44    Diet  Diet NPO  Diet NPO          Assessment/Plan:     Neuro  * Non-convulsive status epilepticus  58F with EtOH induced hepatic cirrhosis, seizure disorder on outpatient LEV and ZNS and bipolar disorder admitted on 5/28 for persistent encephalopathy.    - UTI on admit (Proteus mirabilis), now s/p CTX course  - Hypercalcemia 2/2 lithium toxicity (Lithium changed to Abilify per Psych)  - Hepatic encephalopathy 2/2 to medication non compliance, treated with lactulose and rifaximin     MRI Brain WO was unremarkable for acute neurologic change  EEG w/ frequent left hemispheric electrographic seizures and NCSE  Repeat EEGs without seizure activity x 24 hours, now  resolved    6/11 --> acute hypotension, hypoxia --> hypovolemic shock --> Large R peritoneal hematoma --> IR for class A embo --> s/p IR embolization of left lumbar and internal iliac branch foci of arterial extravasation  6/13 --> episode of hypoxia and SOB --> CTA PE without evidence of PE, CT C/A/P without new bleed    - Continued admission to [facility]  - Q1h neurochecks while in ICU  - Q1h vitals while in ICU  - HOB@30  - Keppra 500mg BID  - Thiamine replacement   - Lactulose, titrate to BM  - MAP>65  - Daily CMP, Mag, Phos - replete electrolytes PRN  - Lipid panel, A1c, Coags reviewed  - Q8 CBC, transfuse PRN  - BID fibrinogen/PT-INR  - Heparin held in setting of acute bleed, IVC filter pending  - PT/OT/SLP as appropriate  - CM/SW consult for dispo planning    Acute encephalopathy  Multifactorial   See Non-convulsive status epilepticus    Breakthrough seizure  See Non-convulsive status epilepticus    Psychiatric  Bipolar 1 disorder  See Non-convulsive status epilepticus    Alcohol abuse, in remission  See Non-convulsive status epilepticus    Pulmonary  Acute hypoxemic respiratory failure  Intubated today for airway protection  Difficult intubation due to far anterior airway    Cardiac/Vascular  Hemorrhagic shock  See Hypovolemic shock    Hypovolemic shock  6/11 --> acute hypotension, hypoxia --> hypovolemic shock --> Large R peritoneal hematoma --> IR for class A embo   s/p IR embolization of left lumbar and internal iliac branch foci of arterial extravasation    Hypotension (arterial)  Hypotensive 2/2 hypovolemic/hemorrhagic shock on 6/11  CTA with large retroperitoneal hematoma, s/p IR embolization of left lumbar and internal iliac branch foci of arterial extravasationn     Renal/  Hypernatremia  Daily CMP  Improved with free water administration  RESOLVED     Hematology  Coagulopathy  Heme consulted  Thrombocytopenic and Liver disease     Acute deep vein thrombosis (DVT) of brachial vein of right upper  extremity  Noted on 6/8  Argatroban per Heme recs - transitioned to heparin 6/10  HELD 6/11 in setting of retroperitoneal bleed  IVC filter placement pending, IR consulted    Deep vein thrombosis (DVT) of femoral vein of right lower extremity  Noted on 6/8  Argatroban per Heme recs - transitioned to heparin  HELD 6/11 in setting of hypovolemic shock 2/2 retroperitoneal hemorrhage   IVC filter placement pending per IR    Thrombocytopenia  Likely secondary to hepatic cirrhosis and development of splenomegaly  Has worsened and associated with hgb drop, transfuse to >50K  No signs of bleeding in GI tract or on CT, hemolysis on labs  Hematology consulted, tested negative for HIT    6/11 --> acute hypotension, hypoxia --> hypovolemic shock --> Large R peritoneal hematoma --> IR for class A embo --> s/p IR embolization of left lumbar and internal iliac branch foci of arterial extravasation    Oncology  Anemia  Chronic    Macrocytic anemia  Follow CBC    Endocrine  Hyperammonemia  Stable on current medical regimen   See Non-convulsive status epilepticus      Severe protein-calorie malnutrition  Nutrition consulted. Most recent weight and BMI monitored-     Measurements:  Wt Readings from Last 1 Encounters:   06/03/23 59.9 kg (132 lb)   Body mass index is 24.14 kg/m².    Patient has been screened and assessed by RD.    Malnutrition Type:  Context: social/environmental circumstances  Level: severe    Malnutrition Characteristic Summary:  Weight Loss (Malnutrition):  (23% x 4 months)  Energy Intake (Malnutrition): less than or equal to 75% for greater than or equal to 1 month  Subcutaneous Fat (Malnutrition): severe depletion (suspecting)  Muscle Mass (Malnutrition): severe depletion  Fluid Accumulation (Malnutrition): other (see comments) (mild-moderate)    Interventions/Recommendations (treatment strategy):  1. Recommend goal of Impact Peptide 1.5 @ 40 ml/hr to provide 1440 kcal, 90 g pro, 739 ml water. Advance/adjust as  appropriate. 2. Bolus TF rec: Impact Peptide 1.5 4 cans per day to provide 1500 kcal, 94 g pro, 772 mL water. 3. RD following.    GI  Acute liver failure without hepatic coma  GI/Hepatology following    Hepatic cirrhosis  Continue Lactulose and Rifaximin   Ammonia normalized   Follow coags and fibrinogen      Hepatic encephalopathy  Continue lactulose   See Non-convulsive status epilepticus    Other  Retroperitoneal bleed  6/11 --> acute hypotension, hypoxia --> hypovolemic shock --> Large R peritoneal hematoma --> IR for class A embo --> s/p IR embolization of left lumbar and internal iliac branch foci of arterial extravasation  Trend Hg, repeat CT c/a/p stable          The patient is being Prophylaxed for:  Venous Thromboembolism with: Mechanical  Stress Ulcer with: PPI  Ventilator Pneumonia with: chlorhexidine oral care    Activity Orders          Elevate HOB flat until bedrest complete starting at 06/11 1641    Diet NPO: NPO starting at 06/01 1055    Turn patient starting at 05/28 2253    Progressive Mobility Protocol (mobilize patient to their highest level of functioning at least twice daily) starting at 05/28 2000        Full Code    Cameron Yadav MD  Neurocritical Care  [facility] - Neuro Critical Care

## 2023-06-14 NOTE — TRANSFER OF CARE
"Anesthesia Transfer of Care Note    Patient: Marely Hamilton    Procedure(s) Performed: * No procedures listed *    Patient location: ICU    Anesthesia Type: general    Transport from OR: Transported from OR intubated on 100% O2  with adequate ventilation controlled by transport ventilator. Continuous ECG monitoring in transport. Continuous SpO2 monitoring in transport. Continuos invasive BP monitoring in transport    Post pain: adequate analgesia    Post assessment: no apparent anesthetic complications    Post vital signs: stable    Level of consciousness: awake and alert    Nausea/Vomiting: no nausea/vomiting    Complications: none    Transfer of care protocol was followed      Last vitals:   Visit Vitals  BP (!) 144/63   Pulse 76   Temp 37.2 °C (98.9 °F)   Resp 14   Ht 5' 2" (1.575 m)   Wt 59.9 kg (132 lb)   SpO2 100%   BMI 24.14 kg/m²     "

## 2023-06-14 NOTE — PLAN OF CARE
[facility] Care Plan    POC reviewed with Marely Hamilton and family at 0300. Pt unable to verbalize understanding due to ett but nods appropiately. Questions and concerns addressed. No acute events overnight. Pt progressing toward goals. Will continue to monitor. See below and flowsheets for full assessment and VS info.     -IR for IVC placement  -trending CBC  -fentanyl gtt        Is this a stroke patient? no    Neuro:  [place] Coma Scale  Best Eye Response: 4-->(E4) spontaneous  Best Motor Response: 6-->(M6) obeys commands  Best Verbal Response: 1-->(V1) none  [place] Coma Scale Score: 11  Assessment Qualifiers: no eye obstruction present, patient intubated  Pupil PERRLA: no (baseline)     24hr Temp:  [98 °F (36.7 °C)-98.9 °F (37.2 °C)]     CV:   Rhythm: normal sinus rhythm  BP goals:   SBP < 180  MAP > 55    Resp:      Vent Mode: A/C  Set Rate: 14 BPM  Oxygen Concentration (%): 50  Vt Set: 340 mL  PEEP/CPAP: 8 cmH20  Pressure Support: 10 cmH20    Plan: wean to extubate    GI/:     Diet/Nutrition Received: NPO, tube feeding  Last Bowel Movement: 06/10/23  Voiding Characteristics: urethral catheter (bladder)    Intake/Output Summary (Last 24 hours) at 6/14/2023 0350  Last data filed at 6/14/2023 0341  Gross per 24 hour   Intake 945.31 ml   Output 1625 ml   Net -679.69 ml     Unmeasured Output  Urine Occurrence: 1  Stool Occurrence: 1  Emesis Occurrence: 0  Pad Count: 1    Labs/Accuchecks:  Recent Labs   Lab 06/14/23  0255   WBC 4.07   RBC 2.42*   HGB 7.1*   HCT 21.8*   PLT 40*      Recent Labs   Lab 06/14/23  0255      K 3.9   CO2 25   *   BUN 24*   CREATININE 0.4*   ALKPHOS 116   ALT 24   AST 31   BILITOT 3.4*      Recent Labs   Lab 06/13/23  1736 06/13/23 2006   INR 1.8*  --    APTT  --  38.3*      Recent Labs   Lab 06/13/23  1533   TROPONINI 0.029*       Electrolytes: Electrolytes replaced  Accuchecks: Q6H    Gtts:   fentanyl 12.5 mcg/hr (06/13/23 1029)       LDA/Wounds:  Lines/Drains/Airways        Central Venous Catheter Line  Duration             Trialysis (Dialysis) Catheter 06/11/23 1300 right internal jugular 2 days              Drain  Duration                  NG/OG Tube 06/01/23 2200 Right nostril 12 days         Rectal Tube 06/03/23 1300 10 days         Urethral Catheter 06/12/23 1405 1 day              Airway  Duration                  Airway - Non-Surgical 06/11/23 1643 2 days              Arterial Line  Duration             Arterial Line 06/11/23 1300 Left Femoral 2 days              Peripheral Intravenous Line  Duration                  Midline Catheter Insertion/Assessment  - Single Lumen 05/25/23 1005 Right basilic vein (medial side of arm) other (see comments) 19 days                  Wounds: Yes  Wound care consulted: Yes

## 2023-06-14 NOTE — SUBJECTIVE & OBJECTIVE
Interval History:  As above    Review of Systems   Unable to perform ROS: Intubated     Objective:     Vitals:  Pulse: 86  Rhythm: normal sinus rhythm  BP: (!) 117/55  MAP (mmHg): 79  Resp: 14  SpO2: 100 %  Oxygen Concentration (%): 50  Vent Mode: A/C  Set Rate: 14 BPM  Vt Set: 340 mL  PEEP/CPAP: 8 cmH20  Peak Airway Pressure: 24 cmH20  Mean Airway Pressure: 10 cmH20  Plateau Pressure: 0 cmH20    Temp  Min: 98 °F (36.7 °C)  Max: 98.9 °F (37.2 °C)  Pulse  Min: 76  Max: 99  BP  Min: 103/46  Max: 151/97  MAP (mmHg)  Min: 73  Max: 93  Resp  Min: 14  Max: 23  SpO2  Min: 91 %  Max: 100 %  Oxygen Concentration (%)  Min: 50  Max: 100    06/13 0701 - 06/14 0700  In: 570.4 [I.V.:40.4]  Out: 1500 [Urine:1500]   Unmeasured Output  Urine Occurrence: 1  Stool Occurrence: 1  Emesis Occurrence: 0  Pad Count: 1        Physical Exam  Vitals and nursing note reviewed.   Constitutional:       General: She is not in acute distress.     Appearance: She is ill-appearing.      Comments: Opens eye spontaneously, following commands   HENT:      Head: Normocephalic and atraumatic.   Eyes:      General: Scleral icterus present.      Extraocular Movements: Extraocular movements intact.      Pupils: Pupils are equal, round, and reactive to light.   Cardiovascular:      Rate and Rhythm: Normal rate.   Pulmonary:      Comments:   Intubated and mechanically ventilated  Abdominal:      Palpations: Abdomen is distended but soft  Musculoskeletal:      Cervical back: Neck supple.   Skin:     General: Skin is warm.   Neurological:      Mental Status: She is alert.      Comments:   Alert  Intubated, unable to test orientation but nods appropriately   Follows commands x4  Pupils equal, reactive  Face grossly symmetric   Moves all extremities antigravity and to commands    Medications:  Continuousfentanyl, Last Rate: 12.5 mcg/hr (06/13/23 1029)    ScheduledARIPiprazole, 5 mg, Daily  folic acid, 1 mg, Daily  levetiracetam, 500 mg, BID  pantoprazole, 40 mg,  BID  rifAXIMin, 550 mg, BID  thiamine, 100 mg, Daily    PRNaluminum-magnesium hydroxide-simethicone, 30 mL, QID PRN  dextrose 10%, 12.5 g, PRN  dextrose 10%, 25 g, PRN  dextrose, 15 g, PRN  dextrose, 30 g, PRN  fentaNYL, 50 mcg, Q1H PRN  glucagon (human recombinant), 1 mg, PRN  insulin aspart U-100, 1-10 Units, Q6H PRN  labetalol, 10 mg, Q4H PRN  magnesium oxide, 800 mg, PRN  magnesium oxide, 800 mg, PRN  melatonin, 6 mg, Nightly PRN  naloxone, 0.02 mg, PRN  ondansetron, 4 mg, Q8H PRN  potassium bicarbonate, 35 mEq, PRN  potassium bicarbonate, 50 mEq, PRN  potassium bicarbonate, 60 mEq, PRN  potassium, sodium phosphates, 2 packet, PRN  potassium, sodium phosphates, 2 packet, PRN  potassium, sodium phosphates, 2 packet, PRN  simethicone, 1 tablet, QID PRN  sodium chloride 0.9%, 10 mL, Q8H PRN      Today I personally reviewed pertinent medications, imaging, laboratory results, notably: n    Hemoglobin stable, 7.6  Plt 44    Diet  Diet NPO  Diet NPO

## 2023-06-14 NOTE — ASSESSMENT & PLAN NOTE
6/11 --> acute hypotension, hypoxia --> hypovolemic shock --> Large R peritoneal hematoma --> IR for class A embo --> s/p IR embolization of left lumbar and internal iliac branch foci of arterial extravasation  Trend Hg, repeat CT c/a/p stable

## 2023-06-14 NOTE — ASSESSMENT & PLAN NOTE
58F with EtOH induced hepatic cirrhosis, seizure disorder on outpatient LEV and ZNS and bipolar disorder admitted on 5/28 for persistent encephalopathy.    - UTI on admit (Proteus mirabilis), now s/p CTX course  - Hypercalcemia 2/2 lithium toxicity (Lithium changed to Abilify per Psych)  - Hepatic encephalopathy 2/2 to medication non compliance, treated with lactulose and rifaximin     MRI Brain WO was unremarkable for acute neurologic change  EEG w/ frequent left hemispheric electrographic seizures and NCSE  Repeat EEGs without seizure activity x 24 hours, now resolved    6/11 --> acute hypotension, hypoxia --> hypovolemic shock --> Large R peritoneal hematoma --> IR for class A embo --> s/p IR embolization of left lumbar and internal iliac branch foci of arterial extravasation  6/13 --> episode of hypoxia and SOB --> CTA PE without evidence of PE, CT C/A/P without new bleed    - Continued admission to [facility]  - Q1h neurochecks while in ICU  - Q1h vitals while in ICU  - HOB@30  - Keppra 500mg BID  - Thiamine replacement   - Lactulose, titrate to BM  - MAP>65  - Daily CMP, Mag, Phos - replete electrolytes PRN  - Lipid panel, A1c, Coags reviewed  - Q8 CBC, transfuse PRN  - BID fibrinogen/PT-INR  - Heparin held in setting of acute bleed, IVC filter pending  - PT/OT/SLP as appropriate  - CM/SW consult for dispo planning

## 2023-06-14 NOTE — ANESTHESIA PREPROCEDURE EVALUATION
06/14/2023  Marely Hamilton is a 58 y.o., female.  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 EEG showed frequent left hemispheric electrographic seizures lasting on average 10-30 seconds with prolonged seizures over 1.5 hours also noted.  GI bleed during admission  Intubated with difficulty for  airway protection . Now vent dependent in ICU with hypoxemia requiring oxygen 60%.  Anemia, thrombocytopenia, decreased fibrinogen, prolonged INR  Hypotension requiring pressors-retropertoneal hematoma discovered 6/11/23- transfused and embolization left lumbar/internal iliac branches. Now with DVT.  Past Surgical History:   Procedure Laterality Date    COSMETIC SURGERY      EMBOLIZATION N/A 6/11/2023    Procedure: EMBOLIZATION, BLOOD VESSEL;  Surgeon: Debbie Surgeon;  Location: [facility];  Service: Anesthesiology;  Laterality: N/A;    ESOPHAGOGASTRODUODENOSCOPY           Vital Signs Range (Last 24H):  Temp:  [36.7 °C (98 °F)-37.4 °C (99.3 °F)]   Pulse:  [76-99]   Resp:  [14-23]   BP: (103-151)/(46-97)   SpO2:  [91 %-100 %]   Arterial Line BP: ()/(39-66)       CBC:     Recent Labs   Lab 05/18/23  1140 05/19/23  0241 05/20/23  0455 05/21/23  0407 05/22/23  0510 05/23/23  0929 05/24/23  0802 05/25/23  0555 05/26/23  0437 05/27/23  1312 05/28/23  1330 05/29/23  0656 05/30/23  0709 05/31/23  0708 06/01/23  0616 06/02/23  0030 06/03/23  0247 06/03/23  1145 06/03/23  1444 06/03/23  1740 06/03/23  2133 06/04/23  0030 06/04/23  0355 06/04/23  1132 06/04/23  1706 06/05/23  0037 06/05/23  0611 06/05/23  1159 06/06/23  0106 06/06/23  1537 06/07/23  0153  06/08/23  0257 06/08/23  2217 06/09/23  0000 06/10/23  0228 06/10/23  1223 06/10/23  2330 06/11/23  1245 06/11/23  1249 06/11/23  1657 06/11/23  1758 06/12/23  0054 06/12/23  0402 06/12/23  1143 06/12/23  1726 06/12/23  2330 06/13/23  1033 06/13/23  1040 06/13/23  2006 06/14/23  0255 06/14/23  0553   WBC 13.00* 13.51* 9.18 17.55* 16.84* 16.87* 9.16 8.63 9.72 11.72 11.61 14.29* 13.16* 13.75* 13.70* 11.17 9.59 7.53 8.58 9.07 9.00 10.03 8.52 6.26 5.46 5.00 5.13 5.67 7.71 6.35 3.63* 3.90 5.80 4.98 5.27 6.70 7.61  --  14.47*  --  9.97 8.01 9.33 7.97 7.62 7.84 5.65  --  4.39 4.07 4.36   RBC 3.53* 3.34* 2.95* 3.62* 3.18* 3.21* 2.88* 2.64* 2.98* 2.65* 2.68* 2.71* 2.35* 2.54* 2.32* 2.28* 1.53* 2.40* 2.36* 2.30* 2.16* 2.26* 2.01* 2.30* 2.42* 2.47* 2.54* 2.40* 2.45* 2.80* 2.61* 2.55* 2.94* 2.70* 2.55* 2.51* 2.34*  --  1.41*  --  2.46* 2.31* 2.34* 2.01* 2.43* 2.48* 2.28*  --  2.66* 2.42* 2.47*   HGB 11.5* 10.9* 9.7* 12.0 10.7* 10.9* 9.6* 8.9* 10.1* 9.2* 9.0* 9.1* 7.9* 8.9* 8.0* 7.9* 5.3* 7.7* 7.5* 7.4* 6.9* 7.2* 6.4* 7.3* 7.7* 7.8* 7.8* 7.5* 7.8* 8.9* 8.3* 8.0* 9.4* 8.6* 8.0* 8.0* 7.4*  --  4.4*  --  7.6* 7.1* 7.1* 6.1* 7.2* 7.3* 6.9*  --  7.8* 7.1* 7.4*   HCT 37.6 37.2 31.6* 39.2 33.0* 34.0* 31.1* 28.4* 32.5* 30.5* 29.5* 30.6* 26.6* 28.3* 27.2* 27.2* 18.4* 24.6* 24.7* 23.3* 22.2* 22.6* 20.4* 23.1* 23.6* 24.8* 24.8* 23.2* 24.4* 28.1* 25.8* 25.7* 29.2* 26.9* 26.6* 26.3* 24.4* <15* 14.8* 18* 23.3* 21.6* 21.5* 18.3* 21.2* 22.6* 20.3* 19* 24.0* 21.8* 22.4*   * 134* 93* 99* 92* 114* 73* 82* 102* 69* 82* 71* 56* 57* 65* 56* 32* 50* 31* 32* 41* 45* 88* 69* 53* 57* 50* 81* 67* 63* 44* 53* 50* 59* 56* 94* 60*  --  172  --  111* 92* 93* 70* 69* 68* 52*  --  42* 40* 44*   * 111* 107* 108* 104* 106* 108* 108* 109* 115* 110* 113* 113* 111* 117* 119* 120* 103* 105* 101* 103* 100* 102* 100* 98 100* 98 97 100* 100* 99* 101* 99* 100* 104* 105* 104*  --  105*  --  95 94 92 91 87 91 89  --  90 90 91   MCH 32.6* 32.6* 32.9* 33.1*  33.6* 34.0* 33.3* 33.7* 33.9* 34.7* 33.6* 33.6* 33.6* 35.0* 34.5* 34.6* 34.6* 32.1* 31.8* 32.2* 31.9* 31.9* 31.8* 31.7* 31.8* 31.6* 30.7 31.3* 31.8* 31.8* 31.8* 31.4* 32.0* 31.9* 31.4* 31.9* 31.6*  --  31.2*  --  30.9 30.7 30.3 30.3 29.6 29.4 30.3  --  29.3 29.3 30.0   MCHC 30.6* 29.3* 30.7* 30.6* 32.4 32.1 30.9* 31.3* 31.1* 30.2* 30.5* 29.7* 29.7* 31.4* 29.4* 29.0* 28.8* 31.3* 30.4* 31.8* 31.1* 31.9* 31.4* 31.6* 32.6 31.5* 31.5* 32.3 32.0 31.7* 32.2 31.1* 32.2 32.0 30.1* 30.4* 30.3*  --  29.7*  --  32.6 32.9 33.0 33.3 34.0 32.3 34.0  --  32.5 32.6 33.0       CMP:   Recent Labs   Lab 05/18/23  1140 05/18/23  1527 05/18/23  1804 05/19/23  0241 05/19/23  1842 05/20/23  0854 05/20/23  1803 05/21/23  0817 05/22/23  0510 05/23/23  0929 05/23/23  1809 05/24/23  0802 05/25/23  0555 05/25/23  1804 05/26/23  0437 05/26/23  1835 05/27/23  0938 05/28/23  1330 05/29/23  0601 05/29/23  1413 05/29/23  2019 05/30/23  0709 05/31/23  0708 06/01/23  0616 06/02/23  0030 06/02/23  1532 06/03/23  0201 06/03/23  0256 06/03/23  1145 06/03/23  1740 06/04/23  0355 06/05/23  0037 06/06/23  0106 06/06/23  1204 06/07/23  0153 06/08/23  0257 06/09/23  0000 06/10/23  0228 06/10/23  1223 06/10/23  1700 06/10/23  2040 06/10/23  2330 06/11/23  0523 06/11/23  0919 06/11/23  1249 06/11/23  1758 06/12/23  0054 06/12/23  1143 06/12/23  2330 06/13/23  0444 06/13/23  2006 06/14/23  0255   *  --  153* 151*  152* 152* 147* 145 144 145 147* 147* 149* 149* 143 148* 147* 146* 150* 148* 149* 148* 144 147* 147* 149*  --  SEE COMMENT 143  --   --  143 143 145 144 146* 146* 147* 151*  --   --   --   --  147*  --  144 145 143  --   --  144  --  144   K 5.0  --  5.5* 4.9  5.0 4.6 3.3* 3.9 4.1 4.0 3.4* 4.8 3.0* 3.0* 4.8 4.2 5.0 4.9 3.5 3.5 3.0* 3.4* 3.8 4.7 3.3* 3.4* 4.0 SEE COMMENT 3.2* 4.7  --  3.3* 3.8 2.7* 2.9* 2.9* 3.9 3.4* 2.9* 4.7  --   --   --  3.7  --  3.8 3.6 3.6  --   --  2.8*  --  3.9   *  --  123* 121*  121* 126* 121* 121* 117* 118* 119*  121* 125* 128* 124* 122* 123* 125* 125* 124* 120* 119* 119* 118* 118* 118*  --  SEE COMMENT 115*  --   --  111* 113* 115* 115* 116* 116* 115* 120*  --   --   --   --  116*  --  114* 114* 114*  --   --  112*  --  114*   CO2 22*  --  24 22*  24 20* 23 18* 24 22* 25 21* 23 19* 18* 23 18* 15* 19* 16* 19* 20* 18* 20* 22* 22*  --  SEE COMMENT 19*  --   --  23 21* 24 24 24 24 26 24  --   --   --   --  25  --  18* 19* 20*  --   --  23  --  25   BUN 56*  --  70* 60*  61* 45* 29* 23* 19 19 19 17 16 13 12 11 13 15 22* 22* 22* 21* 20 20 24* 26*  --  SEE COMMENT 21*  --   --  15 15 16 13 12 10 13 18  --   --   --   --  20  --  23* 22* 26*  --   --  28*  --  24*   CREATININE 1.1  --  1.1 0.9  0.9 0.8 0.8 0.9 0.7 0.9 0.9 0.9 0.6 0.6 0.6 0.6 0.6 0.6 0.5 0.9 0.8 0.8 0.6 0.7 0.9 0.9  --  SEE COMMENT 0.8  --   --  0.6 0.6 0.5 0.5 0.4* 0.4* 0.5  0.5 0.4*  --   --   --   --  0.4*  --  0.6 0.6 0.5  --   --  0.5  --  0.4*   GLU 98  --  203* 176*  179* 213* 183* 151* 145* 102 154* 159* 96 105 217* 164* 156* 130* 119* 153* 211* 179* 162* 120* 145* 156*  --  SEE COMMENT 197*  --   --  189* 209* 177* 120* 97 127* 153* 161*  --   --   --   --  134*  --  91 143* 153*  --   --  151*  --  156*   MG  --  2.7*  --   --   --   --   --   --   --  2.4  --  2.0 1.7  --  2.2  --  2.1  --  1.9  --   --  1.7 1.6 1.7 1.8 2.6 SEE COMMENT 2.4  --   --  2.1 1.9 2.0  --  1.7 1.6 1.7 1.8  --  1.8  --   --   --  1.7  --  1.8  --  1.7  --  1.7  --  1.7   PHOS  --  4.2  --   --   --   --   --   --   --  1.9* 3.4 2.4* 1.8* 3.0 2.0* 3.0 2.6*  --  2.3* 2.3* 2.1* 2.0* 2.5* 3.3 3.8  --  SEE COMMENT 2.2*  --  2.5* 2.2* 2.1* 1.3* 2.1* 1.8* 2.3* 2.0* 1.9*  --   --  2.2*  --   --   --   --  3.7  --  3.1  --  2.4*  --  2.3*   CALCIUM 11.9*  --  11.4* 11.8*  11.9* 11.1* 11.3* 11.5* 11.7* 11.9* 12.2* 11.0* 9.8 8.2* 7.9* 8.5* 8.6* 8.3* 8.6* 8.2* 8.2* 8.1* 8.0* 8.0* 7.9* 7.8*  --  SEE COMMENT 6.9*  --   --  7.5* 7.3* 7.8* 7.8* 8.0* 7.9* 7.9* 8.2*  --   --   --   --  7.9*   --  7.5* 9.0 8.4*  --   --  8.4*  --  8.1*   ALBUMIN 2.1*  --   --  2.0* 1.8* 1.9* 2.0* 2.0* 1.8* 1.8*  --  1.6* 1.4*  --  1.6*  --  1.5* 1.6* 1.5* 1.4* 1.4* 1.4* 1.5* 1.5* 1.4*  --  SEE COMMENT 2.4*  --   --  3.3* 2.9* 2.6*  --  2.4* 2.2* 2.4*  2.5* 2.2*  --   --   --  2.1* 2.1*  --  1.8* 2.4* 2.2*  2.2*  --  2.6* 3.0* 2.7* 2.4*   PROT 5.4*  --   --  5.2* 4.6* 4.7* 5.2* 5.0* 4.9* 4.8*  --  4.3* 3.7*  --  4.4*  --  4.4* 4.0* 4.3*  --   --  4.1* 4.2* 3.9* 4.1*  --  SEE COMMENT 4.0*  --   --  5.2* 4.7* 4.4*  --  4.3* 4.1* 4.6*  4.6* 4.5*  --   --   --  4.3* 4.2*  --  3.5* 4.2* 3.9*  3.8*  --  4.1* 4.2* 4.1* 3.9*   ALKPHOS 119  --   --  106 108 109 126 135 146* 196*  --  162* 148*  --  189*  --  186* 182* 178*  --   --  185* 206* 209* 203*  --  SEE COMMENT 150*  --   --  122 127 142*  --  140* 136* 167*  165* 172*  --   --   --  154* 151*  --  108 93 88  92  --  101 98 122 116   ALT 23  --   --  25 23 32 43 53* 48* 54*  --  55* 57*  --  85*  --  74* 69* 67*  --   --  81* 83* 76* 79*  --  SEE COMMENT 54*  --   --  38 35 29  --  25 27 27  27 23  --   --   --  25 23  --  20 29 31  30  --  28 25 25 24   AST 48*  --   --  55* 61* 55* 99* 97* 90* 96*  --  87* 95*  --  141*  --  117* 88* 104*  --   --  106* 101* 83* 107*  --  SEE COMMENT 69*  --   --  43* 34 31  --  34 38 34  33 30  --   --   --  37 34  --  32 50* 57*  58*  --  44* 37 35 31   BILITOT 4.8*  --   --  3.8* 2.8* 2.7* 2.7* 3.2* 2.6* 2.2*  --  2.5* 2.5*  --  3.1*  --  3.6* 3.7* 3.1*  --   --  3.4* 3.9* 3.0* 3.8*  --  SEE COMMENT 3.5*  --   --  5.0* 3.4* 2.3*  --  3.0* 3.3* 3.6*  3.5* 3.1*  --   --   --  3.3* 3.3*  --  3.1* 3.8* 5.1*  5.2*  --  3.7* 3.5* 3.8* 3.4*       INR:  Recent Labs   Lab 05/18/23  1527 05/27/23  1204 05/28/23  1330 05/29/23  0656 05/30/23  0709 05/31/23  0708 06/01/23  0616 06/02/23  0030 06/03/23  0247 06/03/23  1444 06/04/23  0355 06/04/23  0808 06/04/23  2103 06/05/23  0037 06/05/23  0748 06/05/23  1937 06/06/23  0106  23  0830 23  2034 23  0153 23  0757 23  2107 23  0257 23  0815 23  0347 23  0944 23  1450 23  1830 06/10/23  0634 06/10/23  1223 23  1257 23  1758 23  1016 23  1143 23  1726 23  2330 23  0830 23  1736 23  0808   INR 3.0* 2.0* 2.2* 1.9* 1.8* 1.7* 1.7* 1.8* 2.1* 1.8* 1.8* 1.9*  --  2.0*  --   --  1.9*  --   --  2.1*  --   --  2.2*  --  2.2*  --   --   --   --   --  5.4* 2.5* 1.5* 1.9* 1.9* 1.8* 1.8* 1.9* 1.8*  --  1.8*   APTT  --  43.3*  --   --   --   --   --   --   --  43.0* 40.3* 38.7* 44.4*  --  41.8* 44.0*  --  39.8* 38.0*  --  39.3* 38.0*  --  39.6* 39.3* 64.6* 65.5*  65.5*  65.5*  65.5*  65.5*  65.5* 55.8* 75.7* 71.7*  --  >150.0* 33.3*  --   --   --  35.3* 36.2*  --  38.3* 35.7*         Diagnostic Studies:      EKD Echo:    Pre-op Assessment    I have reviewed the Patient Summary Reports.     I have reviewed the Nursing Notes. I have reviewed the NPO Status.      Review of Systems  Anesthesia Hx:  No problems with previous Anesthesia    Renal/:   Chronic Renal Disease    Hepatic/GI:   Liver Disease,    Musculoskeletal:   Arthritis     Neurological:   Seizures        Physical Exam    Airway:  Mouth Opening: Normal  Tongue: Normal    Chest/Lungs:  Normal Respiratory Rate    Heart:  Rhythm: Regular Rhythm        Anesthesia Plan  Type of Anesthesia, risks & benefits discussed:    Anesthesia Type: Gen ETT  Intra-op Monitoring Plan: Standard ASA Monitors  Induction:  IV  Informed Consent: Informed consent signed with the Patient representative and all parties understand the risks and agree with anesthesia plan.  All questions answered.   ASA Score: 4    Ready For Surgery From Anesthesia Perspective.     .

## 2023-06-14 NOTE — SEDATION DOCUMENTATION
Pt arrived to IR Room 88 for IVC Filter insertion procedure. Under direct care of anesthesia staff at this time.

## 2023-06-14 NOTE — ASSESSMENT & PLAN NOTE
Noted on 6/8  Argatroban per Heme recs - transitioned to heparin 6/10  HELD 6/11 in setting of retroperitoneal bleed  IVC filter placement pending, IR consulted

## 2023-06-14 NOTE — ANESTHESIA POSTPROCEDURE EVALUATION
Anesthesia Post Evaluation    Patient: Marely Hamilton    Procedure(s) Performed: * No procedures listed *    Final Anesthesia Type: general      Patient location during evaluation: ICU  Patient participation: No - Unable to Participate, Intubation  Level of consciousness: sedated  Post-procedure vital signs: reviewed and stable  Pain management: adequate  Airway patency: patent      Anesthetic complications: no      Cardiovascular status: hemodynamically stable  Respiratory status: ETT  Follow-up not needed.          Vitals Value Taken Time   /72 06/14/23 1816   Temp 36.8 °C (98.2 °F) 06/14/23 1752   Pulse 81 06/14/23 1816   Resp 20 06/14/23 1816   SpO2 94 % 06/14/23 1816   Vitals shown include unvalidated device data.      No case tracking events are documented in the log.      Pain/Jaison Score: Pain Rating Prior to Med Admin: 5 (6/13/2023  3:54 PM)

## 2023-06-15 PROBLEM — E87.0 HYPERNATREMIA: Status: RESOLVED | Noted: 2023-05-19 | Resolved: 2023-06-15

## 2023-06-15 LAB
ALBUMIN SERPL BCP-MCNC: 2.5 G/DL (ref 3.5–5.2)
ALBUMIN SERPL BCP-MCNC: 2.6 G/DL (ref 3.5–5.2)
ALLENS TEST: ABNORMAL
ALLENS TEST: ABNORMAL
ALP SERPL-CCNC: 116 U/L (ref 55–135)
ALP SERPL-CCNC: 126 U/L (ref 55–135)
ALT SERPL W/O P-5'-P-CCNC: 21 U/L (ref 10–44)
ALT SERPL W/O P-5'-P-CCNC: 21 U/L (ref 10–44)
ANION GAP SERPL CALC-SCNC: 7 MMOL/L (ref 8–16)
ANION GAP SERPL CALC-SCNC: 8 MMOL/L (ref 8–16)
APTT PPP: 31.4 SEC (ref 21–32)
APTT PPP: 31.6 SEC (ref 21–32)
AST SERPL-CCNC: 30 U/L (ref 10–40)
AST SERPL-CCNC: 31 U/L (ref 10–40)
BASOPHILS # BLD AUTO: 0.01 K/UL (ref 0–0.2)
BASOPHILS NFR BLD: 0.2 % (ref 0–1.9)
BILIRUB DIRECT SERPL-MCNC: 1.6 MG/DL (ref 0.1–0.3)
BILIRUB SERPL-MCNC: 4.5 MG/DL (ref 0.1–1)
BILIRUB SERPL-MCNC: 4.5 MG/DL (ref 0.1–1)
BUN SERPL-MCNC: 15 MG/DL (ref 6–20)
BUN SERPL-MCNC: 17 MG/DL (ref 6–20)
CALCIUM SERPL-MCNC: 8.3 MG/DL (ref 8.7–10.5)
CALCIUM SERPL-MCNC: 8.4 MG/DL (ref 8.7–10.5)
CHLORIDE SERPL-SCNC: 112 MMOL/L (ref 95–110)
CHLORIDE SERPL-SCNC: 115 MMOL/L (ref 95–110)
CO2 SERPL-SCNC: 25 MMOL/L (ref 23–29)
CO2 SERPL-SCNC: 26 MMOL/L (ref 23–29)
CREAT SERPL-MCNC: 0.4 MG/DL (ref 0.5–1.4)
CREAT SERPL-MCNC: 0.4 MG/DL (ref 0.5–1.4)
DELSYS: ABNORMAL
DELSYS: ABNORMAL
DIFFERENTIAL METHOD: ABNORMAL
EOSINOPHIL # BLD AUTO: 0.1 K/UL (ref 0–0.5)
EOSINOPHIL NFR BLD: 1.5 % (ref 0–8)
ERYTHROCYTE [DISTWIDTH] IN BLOOD BY AUTOMATED COUNT: 20.1 % (ref 11.5–14.5)
ERYTHROCYTE [SEDIMENTATION RATE] IN BLOOD BY WESTERGREN METHOD: 14 MM/H
EST. GFR  (NO RACE VARIABLE): >60 ML/MIN/1.73 M^2
EST. GFR  (NO RACE VARIABLE): >60 ML/MIN/1.73 M^2
FIBRINOGEN PPP-MCNC: 143 MG/DL (ref 182–400)
FIBRINOGEN PPP-MCNC: 166 MG/DL (ref 182–400)
FIO2: 50
GLUCOSE SERPL-MCNC: 122 MG/DL (ref 70–110)
GLUCOSE SERPL-MCNC: 127 MG/DL (ref 70–110)
HCO3 UR-SCNC: 25.3 MMOL/L (ref 24–28)
HCO3 UR-SCNC: 26.5 MMOL/L (ref 24–28)
HCT VFR BLD AUTO: 24 % (ref 37–48.5)
HGB BLD-MCNC: 7.7 G/DL (ref 12–16)
IMM GRANULOCYTES # BLD AUTO: 0.01 K/UL (ref 0–0.04)
IMM GRANULOCYTES NFR BLD AUTO: 0.2 % (ref 0–0.5)
INR PPP: 1.6 (ref 0.8–1.2)
INR PPP: 1.9 (ref 0.8–1.2)
LYMPHOCYTES # BLD AUTO: 0.7 K/UL (ref 1–4.8)
LYMPHOCYTES NFR BLD: 15.1 % (ref 18–48)
MAGNESIUM SERPL-MCNC: 1.8 MG/DL (ref 1.6–2.6)
MAGNESIUM SERPL-MCNC: 1.8 MG/DL (ref 1.6–2.6)
MCH RBC QN AUTO: 29.7 PG (ref 27–31)
MCHC RBC AUTO-ENTMCNC: 32.1 G/DL (ref 32–36)
MCV RBC AUTO: 93 FL (ref 82–98)
MODE: ABNORMAL
MODE: ABNORMAL
MONOCYTES # BLD AUTO: 0.5 K/UL (ref 0.3–1)
MONOCYTES NFR BLD: 9.7 % (ref 4–15)
NEUTROPHILS # BLD AUTO: 3.4 K/UL (ref 1.8–7.7)
NEUTROPHILS NFR BLD: 73.3 % (ref 38–73)
NRBC BLD-RTO: 0 /100 WBC
PCO2 BLDA: 34.4 MMHG (ref 35–45)
PCO2 BLDA: 35.4 MMHG (ref 35–45)
PEEP: 8
PH SMN: 7.46 [PH] (ref 7.35–7.45)
PH SMN: 7.49 [PH] (ref 7.35–7.45)
PHOSPHATE SERPL-MCNC: 2.5 MG/DL (ref 2.7–4.5)
PHOSPHATE SERPL-MCNC: 2.9 MG/DL (ref 2.7–4.5)
PLATELET # BLD AUTO: 56 K/UL (ref 150–450)
PMV BLD AUTO: 10.7 FL (ref 9.2–12.9)
PO2 BLDA: 84 MMHG (ref 80–100)
PO2 BLDA: 93 MMHG (ref 80–100)
POC BE: 2 MMOL/L
POC BE: 3 MMOL/L
POC SATURATED O2: 97 % (ref 95–100)
POC SATURATED O2: 98 % (ref 95–100)
POC TCO2: 26 MMOL/L (ref 23–27)
POC TCO2: 27 MMOL/L (ref 23–27)
POCT GLUCOSE: 115 MG/DL (ref 70–110)
POCT GLUCOSE: 119 MG/DL (ref 70–110)
POCT GLUCOSE: 150 MG/DL (ref 70–110)
POCT GLUCOSE: 169 MG/DL (ref 70–110)
POTASSIUM SERPL-SCNC: 3.5 MMOL/L (ref 3.5–5.1)
POTASSIUM SERPL-SCNC: 4.1 MMOL/L (ref 3.5–5.1)
PROT SERPL-MCNC: 4.5 G/DL (ref 6–8.4)
PROT SERPL-MCNC: 4.6 G/DL (ref 6–8.4)
PROTHROMBIN TIME: 16.7 SEC (ref 9–12.5)
PROTHROMBIN TIME: 19.6 SEC (ref 9–12.5)
RBC # BLD AUTO: 2.59 M/UL (ref 4–5.4)
SAMPLE: ABNORMAL
SAMPLE: ABNORMAL
SITE: ABNORMAL
SITE: ABNORMAL
SODIUM SERPL-SCNC: 146 MMOL/L (ref 136–145)
SODIUM SERPL-SCNC: 147 MMOL/L (ref 136–145)
VT: 340
WBC # BLD AUTO: 4.65 K/UL (ref 3.9–12.7)

## 2023-06-15 PROCEDURE — 84100 ASSAY OF PHOSPHORUS: CPT | Mod: 91 | Performed by: PSYCHIATRY & NEUROLOGY

## 2023-06-15 PROCEDURE — 37799 UNLISTED PX VASCULAR SURGERY: CPT

## 2023-06-15 PROCEDURE — 83735 ASSAY OF MAGNESIUM: CPT | Mod: 91 | Performed by: PSYCHIATRY & NEUROLOGY

## 2023-06-15 PROCEDURE — 80048 BASIC METABOLIC PNL TOTAL CA: CPT | Mod: XB | Performed by: PSYCHIATRY & NEUROLOGY

## 2023-06-15 PROCEDURE — 85025 COMPLETE CBC W/AUTO DIFF WBC: CPT | Performed by: EMERGENCY MEDICINE

## 2023-06-15 PROCEDURE — 27200966 HC CLOSED SUCTION SYSTEM

## 2023-06-15 PROCEDURE — 83735 ASSAY OF MAGNESIUM: CPT

## 2023-06-15 PROCEDURE — 99291 CRITICAL CARE FIRST HOUR: CPT | Mod: GC,,, | Performed by: PSYCHIATRY & NEUROLOGY

## 2023-06-15 PROCEDURE — 25000003 PHARM REV CODE 250: Performed by: STUDENT IN AN ORGANIZED HEALTH CARE EDUCATION/TRAINING PROGRAM

## 2023-06-15 PROCEDURE — 80076 HEPATIC FUNCTION PANEL: CPT | Performed by: NURSE PRACTITIONER

## 2023-06-15 PROCEDURE — 99900026 HC AIRWAY MAINTENANCE (STAT)

## 2023-06-15 PROCEDURE — 25000003 PHARM REV CODE 250

## 2023-06-15 PROCEDURE — 27000221 HC OXYGEN, UP TO 24 HOURS

## 2023-06-15 PROCEDURE — 82800 BLOOD PH: CPT

## 2023-06-15 PROCEDURE — 85730 THROMBOPLASTIN TIME PARTIAL: CPT | Performed by: PSYCHIATRY & NEUROLOGY

## 2023-06-15 PROCEDURE — 99900035 HC TECH TIME PER 15 MIN (STAT)

## 2023-06-15 PROCEDURE — 20000000 HC ICU ROOM

## 2023-06-15 PROCEDURE — 94761 N-INVAS EAR/PLS OXIMETRY MLT: CPT

## 2023-06-15 PROCEDURE — 85610 PROTHROMBIN TIME: CPT | Mod: 91 | Performed by: STUDENT IN AN ORGANIZED HEALTH CARE EDUCATION/TRAINING PROGRAM

## 2023-06-15 PROCEDURE — 80053 COMPREHEN METABOLIC PANEL: CPT | Performed by: EMERGENCY MEDICINE

## 2023-06-15 PROCEDURE — 63600175 PHARM REV CODE 636 W HCPCS: Performed by: STUDENT IN AN ORGANIZED HEALTH CARE EDUCATION/TRAINING PROGRAM

## 2023-06-15 PROCEDURE — 85384 FIBRINOGEN ACTIVITY: CPT | Performed by: STUDENT IN AN ORGANIZED HEALTH CARE EDUCATION/TRAINING PROGRAM

## 2023-06-15 PROCEDURE — C9113 INJ PANTOPRAZOLE SODIUM, VIA: HCPCS | Performed by: STUDENT IN AN ORGANIZED HEALTH CARE EDUCATION/TRAINING PROGRAM

## 2023-06-15 PROCEDURE — 84100 ASSAY OF PHOSPHORUS: CPT

## 2023-06-15 PROCEDURE — 82803 BLOOD GASES ANY COMBINATION: CPT

## 2023-06-15 PROCEDURE — 99291 PR CRITICAL CARE, E/M 30-74 MINUTES: ICD-10-PCS | Mod: GC,,, | Performed by: PSYCHIATRY & NEUROLOGY

## 2023-06-15 PROCEDURE — 94003 VENT MGMT INPAT SUBQ DAY: CPT

## 2023-06-15 RX ORDER — OLANZAPINE 2.5 MG/1
5 TABLET ORAL 2 TIMES DAILY
Status: DISCONTINUED | OUTPATIENT
Start: 2023-06-15 | End: 2023-06-22

## 2023-06-15 RX ORDER — OLANZAPINE 2.5 MG/1
5 TABLET ORAL 2 TIMES DAILY
Status: DISCONTINUED | OUTPATIENT
Start: 2023-06-15 | End: 2023-06-15

## 2023-06-15 RX ORDER — QUETIAPINE FUMARATE 25 MG/1
25 TABLET, FILM COATED ORAL NIGHTLY
Status: DISCONTINUED | OUTPATIENT
Start: 2023-06-15 | End: 2023-06-16

## 2023-06-15 RX ORDER — TALC
6 POWDER (GRAM) TOPICAL NIGHTLY
Status: DISCONTINUED | OUTPATIENT
Start: 2023-06-16 | End: 2023-06-20

## 2023-06-15 RX ADMIN — ALUMINUM HYDROXIDE, MAGNESIUM HYDROXIDE, AND SIMETHICONE 30 ML: 200; 200; 20 SUSPENSION ORAL at 08:06

## 2023-06-15 RX ADMIN — QUETIAPINE FUMARATE 25 MG: 25 TABLET ORAL at 09:06

## 2023-06-15 RX ADMIN — PANTOPRAZOLE SODIUM 40 MG: 40 INJECTION, POWDER, FOR SOLUTION INTRAVENOUS at 08:06

## 2023-06-15 RX ADMIN — OLANZAPINE 5 MG: 2.5 TABLET, FILM COATED ORAL at 08:06

## 2023-06-15 RX ADMIN — Medication 800 MG: at 05:06

## 2023-06-15 RX ADMIN — RIFAXIMIN 550 MG: 550 TABLET ORAL at 08:06

## 2023-06-15 RX ADMIN — Medication 800 MG: at 08:06

## 2023-06-15 RX ADMIN — POTASSIUM & SODIUM PHOSPHATES POWDER PACK 280-160-250 MG 2 PACKET: 280-160-250 PACK at 05:06

## 2023-06-15 RX ADMIN — POTASSIUM & SODIUM PHOSPHATES POWDER PACK 280-160-250 MG 2 PACKET: 280-160-250 PACK at 08:06

## 2023-06-15 RX ADMIN — Medication 6 MG: at 08:06

## 2023-06-15 RX ADMIN — OLANZAPINE 5 MG: 2.5 TABLET, FILM COATED ORAL at 12:06

## 2023-06-15 RX ADMIN — Medication 100 MG: at 08:06

## 2023-06-15 RX ADMIN — ARIPIPRAZOLE 5 MG: 5 TABLET ORAL at 08:06

## 2023-06-15 RX ADMIN — LEVETIRACETAM 500 MG: 500 SOLUTION ORAL at 08:06

## 2023-06-15 RX ADMIN — POTASSIUM BICARBONATE 50 MEQ: 978 TABLET, EFFERVESCENT ORAL at 05:06

## 2023-06-15 RX ADMIN — FOLIC ACID 1 MG: 1 TABLET ORAL at 08:06

## 2023-06-15 NOTE — SUBJECTIVE & OBJECTIVE
Interval History:  As above    Review of Systems   Unable to perform ROS: Intubated     Objective:     Vitals:  Temp: 98.5 °F (36.9 °C)  Pulse: 103  Rhythm: normal sinus rhythm  BP: (!) 126/59  MAP (mmHg): 85  Resp: 16  SpO2: 96 %  Oxygen Concentration (%): 40  Vent Mode: Spont  Set Rate: 36.5 BPM  Vt Set: 340 mL  Pressure Support: 5 cmH20  PEEP/CPAP: 5 cmH20  Peak Airway Pressure: 9.5 cmH20  Mean Airway Pressure: 6.8 cmH20  Plateau Pressure: 0 cmH20    Temp  Min: 98.2 °F (36.8 °C)  Max: 99.4 °F (37.4 °C)  Pulse  Min: 76  Max: 109  BP  Min: 115/56  Max: 190/88  MAP (mmHg)  Min: 79  Max: 108  Resp  Min: 14  Max: 32  SpO2  Min: 93 %  Max: 100 %  Oxygen Concentration (%)  Min: 40  Max: 50    06/14 0701 - 06/15 0700  In: 851.6 [I.V.:231.6]  Out: 595 [Urine:595]   Unmeasured Output  Urine Occurrence: 1  Stool Occurrence: 1  Emesis Occurrence: 0  Pad Count: 1        Physical Exam  Vitals and nursing note reviewed.   Constitutional:       General: She is not in acute distress.     Appearance: She is ill-appearing.      Comments: Opens eye spontaneously, following commands   HENT:      Head: Normocephalic and atraumatic.   Eyes:      General: Scleral icterus present.      Extraocular Movements: Extraocular movements intact.      Pupils: Pupils are equal, round, and reactive to light.   Cardiovascular:      Rate and Rhythm: Normal rate.   Pulmonary:      Comments:   Intubated and mechanically ventilated  Abdominal:      Palpations: Abdomen is distended but soft  Musculoskeletal:      Cervical back: Neck supple.   Skin:     General: Skin is warm.   Neurological:      Mental Status: She is alert.      Comments:   Alert  Intubated, unable to test orientation but nods appropriately and mouths words  Follows commands x4  Pupils equal, reactive  Face grossly symmetric   Moves all extremities antigravity and to commands    Medications:  Continuousfentanyl, Last Rate: 12.5 mcg/hr (06/15/23 0601)    ScheduledARIPiprazole, 5 mg,  Daily  folic acid, 1 mg, Daily  levetiracetam, 500 mg, BID  OLANZapine, 5 mg, BID  pantoprazole, 40 mg, BID  rifAXIMin, 550 mg, BID  thiamine, 100 mg, Daily    PRNsodium chloride, , Q24H PRN  aluminum-magnesium hydroxide-simethicone, 30 mL, QID PRN  dextrose 10%, 12.5 g, PRN  dextrose 10%, 25 g, PRN  dextrose, 15 g, PRN  dextrose, 30 g, PRN  fentaNYL, 50 mcg, Q1H PRN  glucagon (human recombinant), 1 mg, PRN  insulin aspart U-100, 1-10 Units, Q6H PRN  labetalol, 10 mg, Q4H PRN  magnesium oxide, 800 mg, PRN  magnesium oxide, 800 mg, PRN  melatonin, 6 mg, Nightly PRN  naloxone, 0.02 mg, PRN  ondansetron, 4 mg, Q8H PRN  potassium bicarbonate, 35 mEq, PRN  potassium bicarbonate, 50 mEq, PRN  potassium bicarbonate, 60 mEq, PRN  potassium, sodium phosphates, 2 packet, PRN  potassium, sodium phosphates, 2 packet, PRN  potassium, sodium phosphates, 2 packet, PRN  simethicone, 1 tablet, QID PRN  sodium chloride 0.9%, 10 mL, Q8H PRN      Today I personally reviewed pertinent medications, imaging, laboratory results, notably: n    Hemoglobin stable  Plt 56    Diet  Diet NPO  Diet NPO

## 2023-06-15 NOTE — ASSESSMENT & PLAN NOTE
Intubated today for airway protection  Difficult intubation due to far anterior airway  Weaning as able

## 2023-06-15 NOTE — ASSESSMENT & PLAN NOTE
Noted on 6/8  Argatroban per Heme recs - transitioned to heparin  HELD 6/11 in setting of hypovolemic shock 2/2 retroperitoneal hemorrhage   IVC filter placed 6/14

## 2023-06-15 NOTE — ASSESSMENT & PLAN NOTE
58F with EtOH induced hepatic cirrhosis, seizure disorder on outpatient LEV and ZNS and bipolar disorder admitted on 5/28 for persistent encephalopathy.    - UTI on admit (Proteus mirabilis), now s/p CTX course  - Hypercalcemia 2/2 lithium toxicity (Lithium changed to Abilify per Psych)  - Hepatic encephalopathy 2/2 to medication non compliance, treated with lactulose and rifaximin     MRI Brain WO was unremarkable for acute neurologic change  EEG w/ frequent left hemispheric electrographic seizures and NCSE  Repeat EEGs without seizure activity x 24 hours, now resolved    6/11 --> acute hypotension, hypoxia --> hypovolemic shock --> Large R peritoneal hematoma --> IR for class A embo --> s/p IR embolization of left lumbar and internal iliac branch foci of arterial extravasation  6/13 --> episode of hypoxia and SOB --> CTA PE without evidence of PE, CT C/A/P without new bleed    - Continued admission to [facility]  - Q1h neurochecks while in ICU  - Q1h vitals while in ICU  - HOB@30  - Keppra 500mg BID  - Thiamine replacement   - Lactulose, titrate to BM  - MAP>65  - Daily CMP, Mag, Phos - replete electrolytes PRN  - Lipid panel, A1c, Coags reviewed  - Q8 CBC, transfuse PRN  - BID fibrinogen/PT-INR  - Heparin held in setting of acute bleed, IVC filter in place  - PT/OT/SLP as appropriate  - CM/SW consult for dispo planning

## 2023-06-15 NOTE — PLAN OF CARE
[facility] Care Plan    POC reviewed with Marely Hamilton and family at 0300. Pt verbalized understanding. Questions and concerns addressed. No acute events overnight. Pt progressing toward goals. Will continue to monitor. See below and flowsheets for full assessment and VS info.     -L femoral a-line in place  -Fentanyl @ 12.5  -TF off @0500 for possible extubation  -Saldaña in place; U.O. 595 mL  -Flexi in place; output 100 mL        Is this a stroke patient? no    Neuro:  [place] Coma Scale  Best Eye Response: 4-->(E4) spontaneous  Best Motor Response: 6-->(M6) obeys commands  Best Verbal Response: 1-->(V1) none  [place] Coma Scale Score: 11  Assessment Qualifiers: patient intubated, no eye obstruction present  Pupil PERRLA: no (baseline)     24hr Temp:  [98.2 °F (36.8 °C)-99.4 °F (37.4 °C)]     CV:   Rhythm: normal sinus rhythm  BP goals:   SBP < 140  MAP >  55    Resp:      Vent Mode: A/C  Set Rate: 36.5 BPM  Oxygen Concentration (%): 50  Vt Set: 340 mL  PEEP/CPAP: 8 cmH20  Pressure Support: 10 cmH20    Plan: wean to extubate    GI/:     Diet/Nutrition Received: NPO, tube feeding  Last Bowel Movement: 06/14/23  Voiding Characteristics: urethral catheter (bladder)    Intake/Output Summary (Last 24 hours) at 6/15/2023 0621  Last data filed at 6/15/2023 0601  Gross per 24 hour   Intake 851.56 ml   Output 595 ml   Net 256.56 ml     Unmeasured Output  Urine Occurrence: 1  Stool Occurrence: 1  Emesis Occurrence: 0  Pad Count: 1    Labs/Accuchecks:  Recent Labs   Lab 06/15/23  0333   WBC 4.65   RBC 2.59*   HGB 7.7*   HCT 24.0*   PLT 56*      Recent Labs   Lab 06/15/23  0333   *   K 3.5   CO2 26   *   BUN 17   CREATININE 0.4*   ALKPHOS 116   ALT 21   AST 31   BILITOT 4.5*      Recent Labs   Lab 06/14/23  1840 06/14/23  2046   INR 1.6*  --    APTT  --  29.6      Recent Labs   Lab 06/13/23  1533   TROPONINI 0.029*       Electrolytes: Electrolytes replaced  Accuchecks: Q6H    Gtts:   fentanyl 12.5 mcg/hr (06/15/23  0601)       LDA/Wounds:  Lines/Drains/Airways       Central Venous Catheter Line  Duration             Trialysis (Dialysis) Catheter 06/11/23 1300 right internal jugular 3 days              Drain  Duration                  NG/OG Tube 06/01/23 2200 Right nostril 13 days         Rectal Tube 06/03/23 1300 11 days         Urethral Catheter 06/12/23 1405 2 days              Airway  Duration                  Airway - Non-Surgical 06/11/23 1643 3 days              Arterial Line  Duration             Arterial Line 06/11/23 1300 Left Femoral 3 days              Peripheral Intravenous Line  Duration                  Midline Catheter Insertion/Assessment  - Single Lumen 05/25/23 1005 Right basilic vein (medial side of arm) other (see comments) 20 days                  Wounds: Yes  Wound care consulted: Yes

## 2023-06-15 NOTE — ASSESSMENT & PLAN NOTE
6/11 --> acute hypotension, hypoxia --> hypovolemic shock --> Large R peritoneal hematoma --> IR for class A embo   s/p IR embolization of left lumbar and internal iliac branch foci of arterial extravasation  RESOLVED

## 2023-06-15 NOTE — RESPIRATORY THERAPY
Attempted to get parameters for possible extubation..  Patient could not get higher than -11 for Nif and it wasn't purposeful.  Vital capacity was attempted but unsuccessful to get any numbers.  Patient vent alarming for low tidal volume.  Increased Pressure Support to 10.  Notified MD, will reassess later.

## 2023-06-15 NOTE — ASSESSMENT & PLAN NOTE
Noted on 6/8  Argatroban per Heme recs - transitioned to heparin 6/10  HELD 6/11 in setting of retroperitoneal bleed  IVC filter placed 6/14

## 2023-06-15 NOTE — ASSESSMENT & PLAN NOTE
Hypotensive 2/2 hypovolemic/hemorrhagic shock on 6/11  CTA with large retroperitoneal hematoma, s/p IR embolization of left lumbar and internal iliac branch foci of arterial extravasation  RESOLVED

## 2023-06-15 NOTE — PROGRESS NOTES
[facility] Neuro Critical Care  Neurocritical Care  Progress Note    Admit Date: 5/28/2023  Service Date: 06/15/2023  Length of Stay: 18    Subjective:     Chief Complaint: Non-convulsive status epilepticus    History of Present Illness: Marely Hamilton is a 58 year old female with a medical history significant for EtOH induced hepatic cirrhosis, seizure disorder on outpatient LEV and ZNS and bipolar disorder who was admitted to [facility] on 5/28 as a transfer from OSH for evaluation of persistent encephalopathy concerning for NCSE vs hepatic encephalopathy. History is obtained from chart as patient is unresponsive and unable to assist. Initially presented to [facility] on 5/18 for encephalopathy and thought to be multifactorial including UTI (Proteus mirabilis s/p CTX course), hypercalcemia 2/2 lithium toxicity (Lithium changed to Abilify per Psych), as well as hepatic encephalopathy secondary to medication non compliance. She was treated with lactulose and rifaximin with no improvement in her mental status. EEG showed generalized slowing as well as triphasic discharges in the left hemisphere. MRI Brain WO was unremarkable for acute neurologic change. Decision was made to transfer patient to [facility] for higher level of care and ongoing evaluation and management. On arrival, mental status exam has waxed and waned with patient being intermittently verbal and following commands. NH4 48.    NCC is consulted on hospital day 4 for admission after EEG showed frequent left hemispheric electrographic seizures lasting on average 10-30 seconds with prolonged seizures over 1.5 hours also noted. Per chart review, patient home dose of LEV increased from 1000mg to 1500mg BID, ZNS increased to 200mg. Vimpat 150mg BID added per General Neurology (had not been given prior to consult). On initial evaluation, patient is not responsive to voice or pain. Upward gaze noted. Decision made to transfer patient to [facility] for higher level of care and  airway watch.       Hospital Course: 06/02/2023 Tachycardic and hypotensive, transferred for development of mostly right hemispheric status, LOC exam remains poor  Intubated for airway protection, EEG has changed to mostly include triphasics with no definite seizure activity per Dr Boss, propofol stopped and AEDs simplified to keppra 1000mg bid only, wean off pressor, give colloids with crystalloid resuscitation  06/03/2023: remains on persistant significant pressor support despite adequate hydration, problems with third spacing and may have pulmonary hypertension as well, consider trial of stress steroids if hgb stabilizes  06/04/2023: levo down to 0.08mcg, start and advance TFs, vaso at 0.04U, ammonia has been stable, slight rise in tbili, check direct bili, hgb and plts improved, no clear source of bleeding, no source of bleeding on CT abdomen, consider superimposed hemolysis  06/05/2023 NAEON. Neurologic exam continues to improve, following commands in all extremities. Levo 0.02, weaning as able. Vaso discontinued, MAP>65. Daily SBT, monitor and hold TF at midnight for possible extubation tomorrow. Hemolysis work up ongoing, trending CBC. Continue CTX for SBP prophylaxis.   06/06/2023 Awake and following commands. SBT with low tidal volumes. Remains intubated for airway protection at this time with possible extubation tomorrow with continued neurologic improvement. Levo discontinued, maintaining SBP<65. Concern for hemolytic anemia related to autoimmune process vs hepatic dysfunction (elevated reticulocyte count and decreased haptoglobin).  06/07/2023 Rested on AC overnight due to low TV and fatigue. SBT with pressure support 10/5 this am, weaning ventilator as tolerated. Continue tube feeds with goal to optimize nutrition. Ammonia elevated, continue lactulose to encourage bowel movement.   06/08/2023 Failed SBT due to apnea. Some breaths on SIMV. Awake and following commands. Ammonia trending down (44) with  BMs, continue lactulose. Advance nutritional support with goal to increase strength towards extubation. Plt transfusions PRN. Cryo for fibrinogen <100.  06/09/2023 Venous US with R brachial and femoral DVT. Dicussed with Hematology with recommendation to start Argatroban with plts>50. Daily SBT with apnea, maintain on SIMV with backup rate of 10. Hyperammonemia resolved.   06/10/2023 patient is more alert and awake, tolerated SBT. Hematology agreed to switch to heparin for DVT given negative for HIT  06/11/2023   On AM rounds, patient was noted to be hypoxic and tachycardic. Hypoxia resolved with ventilator changes but patient continued to appear uncomfortable. Patient lied flat with noted improvement in oxygenation. Patient became acutely hypotensive, tachycardiac and hypoxic not responsive to ventilator changes. IVF bolus + Burton bump x3 + initiation of vasopressin due to concern for hypovolemic shock. Levophed added due to persistent hypertension. L femoral arterial line and R IJ trialysis placed emergently. STAT Hemoglobin 4.4. Received protamine for heparin reversal, 3u Plts and 3 FFP and 2 pRBC. STAT CTA abdo/pelvis with L retroperitoneal hematoma with + spot sign. IR consulted and patient taken class A for diagnostic angiogram with possible emobolization. Pressors weaned after volume resuscitation. Family updated over phone.   06/12/2023 s/p IR embolization of left lumbar and internal iliac branch foci of arterial extravasation. Hemoglobin stable, continue to trend CBC q8 with transfusion of pRBC for Hg<7. Restart trickle feeds. Albumin and lasix for dieresis. Low threshold for antibiotics given risk of SBP and RP hematoma. Unlikely to tolerate AC, will discuss placement of IVC filter with IR.   06/13/2023 Attempted to wean FiO2 overnight with noted drop of pO2. FiO2 increased to 55% and patient received albumin and was diuresed. Weaning this am with ABGs. Remains on fentanyl 12.5, neurologic exam unchanged. Hg  stable. Post AM rounds, patient with acute increase in O2 needs, STAT CTA chest/abd/pelvis to assess for PE vs further hemorrhage stable and without new findings. Continue current management.   06/14/2023 NAEON. Hg stable at 7.6. Plt 44, transfuse for <50. Pending IVC filter per IR for DVT burden with contraindications to AC.  06/15/2023 s/p IVC filter placement. Daily SBT, complicated by anxiety. Remains on spontaneous mode, 10/5, 40% FiO2 with goal to wean as able.       Interval History:  As above    Review of Systems   Unable to perform ROS: Intubated     Objective:     Vitals:  Temp: 98.5 °F (36.9 °C)  Pulse: 103  Rhythm: normal sinus rhythm  BP: (!) 126/59  MAP (mmHg): 85  Resp: 16  SpO2: 96 %  Oxygen Concentration (%): 40  Vent Mode: Spont  Set Rate: 36.5 BPM  Vt Set: 340 mL  Pressure Support: 5 cmH20  PEEP/CPAP: 5 cmH20  Peak Airway Pressure: 9.5 cmH20  Mean Airway Pressure: 6.8 cmH20  Plateau Pressure: 0 cmH20    Temp  Min: 98.2 °F (36.8 °C)  Max: 99.4 °F (37.4 °C)  Pulse  Min: 76  Max: 109  BP  Min: 115/56  Max: 190/88  MAP (mmHg)  Min: 79  Max: 108  Resp  Min: 14  Max: 32  SpO2  Min: 93 %  Max: 100 %  Oxygen Concentration (%)  Min: 40  Max: 50    06/14 0701 - 06/15 0700  In: 851.6 [I.V.:231.6]  Out: 595 [Urine:595]   Unmeasured Output  Urine Occurrence: 1  Stool Occurrence: 1  Emesis Occurrence: 0  Pad Count: 1        Physical Exam  Vitals and nursing note reviewed.   Constitutional:       General: She is not in acute distress.     Appearance: She is ill-appearing.      Comments: Opens eye spontaneously, following commands   HENT:      Head: Normocephalic and atraumatic.   Eyes:      General: Scleral icterus present.      Extraocular Movements: Extraocular movements intact.      Pupils: Pupils are equal, round, and reactive to light.   Cardiovascular:      Rate and Rhythm: Normal rate.   Pulmonary:      Comments:   Intubated and mechanically ventilated  Abdominal:      Palpations: Abdomen is distended but  soft  Musculoskeletal:      Cervical back: Neck supple.   Skin:     General: Skin is warm.   Neurological:      Mental Status: She is alert.      Comments:   Alert  Intubated, unable to test orientation but nods appropriately and mouths words  Follows commands x4  Pupils equal, reactive  Face grossly symmetric   Moves all extremities antigravity and to commands    Medications:  Continuousfentanyl, Last Rate: 12.5 mcg/hr (06/15/23 0601)    ScheduledARIPiprazole, 5 mg, Daily  folic acid, 1 mg, Daily  levetiracetam, 500 mg, BID  OLANZapine, 5 mg, BID  pantoprazole, 40 mg, BID  rifAXIMin, 550 mg, BID  thiamine, 100 mg, Daily    PRNsodium chloride, , Q24H PRN  aluminum-magnesium hydroxide-simethicone, 30 mL, QID PRN  dextrose 10%, 12.5 g, PRN  dextrose 10%, 25 g, PRN  dextrose, 15 g, PRN  dextrose, 30 g, PRN  fentaNYL, 50 mcg, Q1H PRN  glucagon (human recombinant), 1 mg, PRN  insulin aspart U-100, 1-10 Units, Q6H PRN  labetalol, 10 mg, Q4H PRN  magnesium oxide, 800 mg, PRN  magnesium oxide, 800 mg, PRN  melatonin, 6 mg, Nightly PRN  naloxone, 0.02 mg, PRN  ondansetron, 4 mg, Q8H PRN  potassium bicarbonate, 35 mEq, PRN  potassium bicarbonate, 50 mEq, PRN  potassium bicarbonate, 60 mEq, PRN  potassium, sodium phosphates, 2 packet, PRN  potassium, sodium phosphates, 2 packet, PRN  potassium, sodium phosphates, 2 packet, PRN  simethicone, 1 tablet, QID PRN  sodium chloride 0.9%, 10 mL, Q8H PRN      Today I personally reviewed pertinent medications, imaging, laboratory results, notably: n    Hemoglobin stable  Plt 56    Diet  Diet NPO  Diet NPO          Assessment/Plan:     Neuro  * Non-convulsive status epilepticus  58F with EtOH induced hepatic cirrhosis, seizure disorder on outpatient LEV and ZNS and bipolar disorder admitted on 5/28 for persistent encephalopathy.    - UTI on admit (Proteus mirabilis), now s/p CTX course  - Hypercalcemia 2/2 lithium toxicity (Lithium changed to Abilify per Psych)  - Hepatic encephalopathy  2/2 to medication non compliance, treated with lactulose and rifaximin     MRI Brain WO was unremarkable for acute neurologic change  EEG w/ frequent left hemispheric electrographic seizures and NCSE  Repeat EEGs without seizure activity x 24 hours, now resolved    6/11 --> acute hypotension, hypoxia --> hypovolemic shock --> Large R peritoneal hematoma --> IR for class A embo --> s/p IR embolization of left lumbar and internal iliac branch foci of arterial extravasation  6/13 --> episode of hypoxia and SOB --> CTA PE without evidence of PE, CT C/A/P without new bleed    - Continued admission to [facility]  - Q1h neurochecks while in ICU  - Q1h vitals while in ICU  - HOB@30  - Keppra 500mg BID  - Thiamine replacement   - Lactulose, titrate to BM  - MAP>65  - Daily CMP, Mag, Phos - replete electrolytes PRN  - Lipid panel, A1c, Coags reviewed  - Q8 CBC, transfuse PRN  - BID fibrinogen/PT-INR  - Heparin held in setting of acute bleed, IVC filter in place  - PT/OT/SLP as appropriate  - CM/SW consult for dispo planning    Acute encephalopathy  Multifactorial   See Non-convulsive status epilepticus    Breakthrough seizure  See Non-convulsive status epilepticus    Psychiatric  Bipolar 1 disorder  See Non-convulsive status epilepticus    Alcohol abuse, in remission  See Non-convulsive status epilepticus    Pulmonary  Acute hypoxemic respiratory failure  Intubated today for airway protection  Difficult intubation due to far anterior airway  Weaning as able    Cardiac/Vascular  Hemorrhagic shock  RESOLVED  See Hypovolemic shock    Hypovolemic shock  6/11 --> acute hypotension, hypoxia --> hypovolemic shock --> Large R peritoneal hematoma --> IR for class A embo   s/p IR embolization of left lumbar and internal iliac branch foci of arterial extravasation  RESOLVED    Hypotension (arterial)  Hypotensive 2/2 hypovolemic/hemorrhagic shock on 6/11  CTA with large retroperitoneal hematoma, s/p IR embolization of left lumbar and internal  iliac branch foci of arterial extravasation  RESOLVED     Renal/  Hypernatremia-resolved as of 6/15/2023  Daily CMP  Improved with free water administration  RESOLVED     Hematology  Coagulopathy  Heme consulted  Thrombocytopenic and Liver disease     Acute deep vein thrombosis (DVT) of brachial vein of right upper extremity  Noted on 6/8  Argatroban per Heme recs - transitioned to heparin 6/10  HELD 6/11 in setting of retroperitoneal bleed  IVC filter placed 6/14    Deep vein thrombosis (DVT) of femoral vein of right lower extremity  Noted on 6/8  Argatroban per Heme recs - transitioned to heparin  HELD 6/11 in setting of hypovolemic shock 2/2 retroperitoneal hemorrhage   IVC filter placed 6/14    Thrombocytopenia  Likely secondary to hepatic cirrhosis and development of splenomegaly  Has worsened and associated with hgb drop, transfuse to >50K  No signs of bleeding in GI tract or on CT, hemolysis on labs  Hematology consulted, tested negative for HIT    6/11 --> acute hypotension, hypoxia --> hypovolemic shock --> Large R peritoneal hematoma --> IR for class A embo --> s/p IR embolization of left lumbar and internal iliac branch foci of arterial extravasation    Oncology  Anemia  Chronic    Macrocytic anemia  Follow CBC    Endocrine  Hyperammonemia  Stable on current medical regimen   See Non-convulsive status epilepticus      Severe protein-calorie malnutrition  Nutrition consulted. Most recent weight and BMI monitored-     Measurements:  Wt Readings from Last 1 Encounters:   06/03/23 59.9 kg (132 lb)   Body mass index is 24.14 kg/m².    Patient has been screened and assessed by RD.    Malnutrition Type:  Context: social/environmental circumstances  Level: severe    Malnutrition Characteristic Summary:  Weight Loss (Malnutrition):  (23% x 4 months)  Energy Intake (Malnutrition): less than or equal to 75% for greater than or equal to 1 month  Subcutaneous Fat (Malnutrition): severe depletion (suspecting)  Muscle  Mass (Malnutrition): severe depletion  Fluid Accumulation (Malnutrition): other (see comments) (mild-moderate)    Interventions/Recommendations (treatment strategy):  1. Recommend goal of Impact Peptide 1.5 @ 40 ml/hr to provide 1440 kcal, 90 g pro, 739 ml water. Advance/adjust as appropriate. 2. Bolus TF rec: Impact Peptide 1.5 4 cans per day to provide 1500 kcal, 94 g pro, 772 mL water. 3. RD following.    GI  Acute liver failure without hepatic coma  GI/Hepatology following    Hepatic cirrhosis  Continue Lactulose and Rifaximin   Ammonia normalized   Follow coags and fibrinogen      Hepatic encephalopathy  Continue lactulose   See Non-convulsive status epilepticus    Other  Retroperitoneal bleed  6/11 --> acute hypotension, hypoxia --> hypovolemic shock --> Large R peritoneal hematoma --> IR for class A embo --> s/p IR embolization of left lumbar and internal iliac branch foci of arterial extravasation  Trend Hg, repeat CT c/a/p stable          The patient is being Prophylaxed for:  Venous Thromboembolism with: Mechanical  Stress Ulcer with: PPI  Ventilator Pneumonia with: chlorhexidine oral care    Activity Orders          Diet NPO: NPO starting at 06/15 0500    Elevate HOB flat until bedrest complete starting at 06/11 1641    Turn patient starting at 05/28 2253    Progressive Mobility Protocol (mobilize patient to their highest level of functioning at least twice daily) starting at 05/28 2000        Full Code    Cameron Yadav MD  Neurocritical Care  [facility] - Neuro Critical Care

## 2023-06-16 LAB
ALBUMIN SERPL BCP-MCNC: 2.3 G/DL (ref 3.5–5.2)
ALBUMIN SERPL BCP-MCNC: 2.8 G/DL (ref 3.5–5.2)
ALLENS TEST: ABNORMAL
ALLENS TEST: ABNORMAL
ALP SERPL-CCNC: 109 U/L (ref 55–135)
ALP SERPL-CCNC: 127 U/L (ref 55–135)
ALT SERPL W/O P-5'-P-CCNC: 19 U/L (ref 10–44)
ALT SERPL W/O P-5'-P-CCNC: 22 U/L (ref 10–44)
ANION GAP SERPL CALC-SCNC: 5 MMOL/L (ref 8–16)
ANISOCYTOSIS BLD QL SMEAR: SLIGHT
ANISOCYTOSIS BLD QL SMEAR: SLIGHT
APTT PPP: 30.5 SEC (ref 21–32)
APTT PPP: 31.1 SEC (ref 21–32)
AST SERPL-CCNC: 28 U/L (ref 10–40)
AST SERPL-CCNC: 42 U/L (ref 10–40)
BASOPHILS # BLD AUTO: 0.01 K/UL (ref 0–0.2)
BASOPHILS NFR BLD: 0 % (ref 0–1.9)
BASOPHILS NFR BLD: 0.4 % (ref 0–1.9)
BILIRUB DIRECT SERPL-MCNC: 2 MG/DL (ref 0.1–0.3)
BILIRUB SERPL-MCNC: 4.2 MG/DL (ref 0.1–1)
BILIRUB SERPL-MCNC: 5.4 MG/DL (ref 0.1–1)
BLD PROD TYP BPU: NORMAL
BLD PROD TYP BPU: NORMAL
BLOOD UNIT EXPIRATION DATE: NORMAL
BLOOD UNIT EXPIRATION DATE: NORMAL
BLOOD UNIT TYPE CODE: 600
BLOOD UNIT TYPE CODE: 6200
BLOOD UNIT TYPE: NORMAL
BLOOD UNIT TYPE: NORMAL
BUN SERPL-MCNC: 15 MG/DL (ref 6–20)
BURR CELLS BLD QL SMEAR: ABNORMAL
CALCIUM SERPL-MCNC: 8.2 MG/DL (ref 8.7–10.5)
CHLORIDE SERPL-SCNC: 117 MMOL/L (ref 95–110)
CO2 SERPL-SCNC: 27 MMOL/L (ref 23–29)
CODING SYSTEM: NORMAL
CODING SYSTEM: NORMAL
CREAT SERPL-MCNC: 0.4 MG/DL (ref 0.5–1.4)
CROSSMATCH INTERPRETATION: NORMAL
CROSSMATCH INTERPRETATION: NORMAL
DELSYS: ABNORMAL
DELSYS: ABNORMAL
DIFFERENTIAL METHOD: ABNORMAL
DIFFERENTIAL METHOD: ABNORMAL
DISPENSE STATUS: NORMAL
DISPENSE STATUS: NORMAL
EOSINOPHIL # BLD AUTO: 0.1 K/UL (ref 0–0.5)
EOSINOPHIL NFR BLD: 2 % (ref 0–8)
EOSINOPHIL NFR BLD: 2.2 % (ref 0–8)
ERYTHROCYTE [DISTWIDTH] IN BLOOD BY AUTOMATED COUNT: 21.1 % (ref 11.5–14.5)
ERYTHROCYTE [DISTWIDTH] IN BLOOD BY AUTOMATED COUNT: 21.4 % (ref 11.5–14.5)
ERYTHROCYTE [SEDIMENTATION RATE] IN BLOOD BY WESTERGREN METHOD: 12 MM/H
EST. GFR  (NO RACE VARIABLE): >60 ML/MIN/1.73 M^2
FIBRINOGEN PPP-MCNC: 141 MG/DL (ref 182–400)
FIBRINOGEN PPP-MCNC: 159 MG/DL (ref 182–400)
FIO2: 40
FIO2: 40
GLUCOSE SERPL-MCNC: 174 MG/DL (ref 70–110)
HCO3 UR-SCNC: 26 MMOL/L (ref 24–28)
HCO3 UR-SCNC: 26.4 MMOL/L (ref 24–28)
HCT VFR BLD AUTO: 23.6 % (ref 37–48.5)
HCT VFR BLD AUTO: 23.9 % (ref 37–48.5)
HGB BLD-MCNC: 7.3 G/DL (ref 12–16)
HGB BLD-MCNC: 7.4 G/DL (ref 12–16)
HYPOCHROMIA BLD QL SMEAR: ABNORMAL
HYPOCHROMIA BLD QL SMEAR: ABNORMAL
IMM GRANULOCYTES # BLD AUTO: 0.01 K/UL (ref 0–0.04)
IMM GRANULOCYTES # BLD AUTO: ABNORMAL K/UL (ref 0–0.04)
IMM GRANULOCYTES NFR BLD AUTO: 0.4 % (ref 0–0.5)
IMM GRANULOCYTES NFR BLD AUTO: ABNORMAL % (ref 0–0.5)
INR PPP: 1.8 (ref 0.8–1.2)
INR PPP: 1.8 (ref 0.8–1.2)
LYMPHOCYTES # BLD AUTO: 0.3 K/UL (ref 1–4.8)
LYMPHOCYTES NFR BLD: 10 % (ref 18–48)
LYMPHOCYTES NFR BLD: 11.5 % (ref 18–48)
MAGNESIUM SERPL-MCNC: 1.9 MG/DL (ref 1.6–2.6)
MCH RBC QN AUTO: 29.7 PG (ref 27–31)
MCH RBC QN AUTO: 29.8 PG (ref 27–31)
MCHC RBC AUTO-ENTMCNC: 30.9 G/DL (ref 32–36)
MCHC RBC AUTO-ENTMCNC: 31 G/DL (ref 32–36)
MCV RBC AUTO: 96 FL (ref 82–98)
MCV RBC AUTO: 96 FL (ref 82–98)
MODE: ABNORMAL
MODE: ABNORMAL
MONOCYTES # BLD AUTO: 0.2 K/UL (ref 0.3–1)
MONOCYTES NFR BLD: 4 % (ref 4–15)
MONOCYTES NFR BLD: 8.8 % (ref 4–15)
NEUTROPHILS # BLD AUTO: 1.7 K/UL (ref 1.8–7.7)
NEUTROPHILS NFR BLD: 76.7 % (ref 38–73)
NEUTROPHILS NFR BLD: 84 % (ref 38–73)
NRBC BLD-RTO: 0 /100 WBC
NRBC BLD-RTO: 0 /100 WBC
OVALOCYTES BLD QL SMEAR: ABNORMAL
PCO2 BLDA: 35.4 MMHG (ref 35–45)
PCO2 BLDA: 38.1 MMHG (ref 35–45)
PEEP: 5
PEEP: 5
PH SMN: 7.45 [PH] (ref 7.35–7.45)
PH SMN: 7.47 [PH] (ref 7.35–7.45)
PHOSPHATE SERPL-MCNC: 2.3 MG/DL (ref 2.7–4.5)
PIP: 13
PLATELET # BLD AUTO: 34 K/UL (ref 150–450)
PLATELET # BLD AUTO: 37 K/UL (ref 150–450)
PLATELET BLD QL SMEAR: ABNORMAL
PLATELET BLD QL SMEAR: ABNORMAL
PMV BLD AUTO: 10.9 FL (ref 9.2–12.9)
PMV BLD AUTO: 11 FL (ref 9.2–12.9)
PO2 BLDA: 61 MMHG (ref 80–100)
PO2 BLDA: 65 MMHG (ref 80–100)
POC BE: 2 MMOL/L
POC BE: 2 MMOL/L
POC SATURATED O2: 92 % (ref 95–100)
POC SATURATED O2: 94 % (ref 95–100)
POC TCO2: 27 MMOL/L (ref 23–27)
POC TCO2: 28 MMOL/L (ref 23–27)
POCT GLUCOSE: 165 MG/DL (ref 70–110)
POCT GLUCOSE: 215 MG/DL (ref 70–110)
POCT GLUCOSE: 235 MG/DL (ref 70–110)
POIKILOCYTOSIS BLD QL SMEAR: SLIGHT
POIKILOCYTOSIS BLD QL SMEAR: SLIGHT
POLYCHROMASIA BLD QL SMEAR: ABNORMAL
POLYCHROMASIA BLD QL SMEAR: ABNORMAL
POTASSIUM SERPL-SCNC: 3.9 MMOL/L (ref 3.5–5.1)
PROCALCITONIN SERPL IA-MCNC: 0.26 NG/ML
PROT SERPL-MCNC: 4.2 G/DL (ref 6–8.4)
PROT SERPL-MCNC: 4.8 G/DL (ref 6–8.4)
PROTHROMBIN TIME: 18.3 SEC (ref 9–12.5)
PROTHROMBIN TIME: 18.6 SEC (ref 9–12.5)
PS: 5
RBC # BLD AUTO: 2.46 M/UL (ref 4–5.4)
RBC # BLD AUTO: 2.48 M/UL (ref 4–5.4)
SAMPLE: ABNORMAL
SAMPLE: ABNORMAL
SITE: ABNORMAL
SITE: ABNORMAL
SODIUM SERPL-SCNC: 149 MMOL/L (ref 136–145)
SP02: 95
SPONT RATE: 20
UNIT NUMBER: NORMAL
UNIT NUMBER: NORMAL
WBC # BLD AUTO: 1.8 K/UL (ref 3.9–12.7)
WBC # BLD AUTO: 2.27 K/UL (ref 3.9–12.7)

## 2023-06-16 PROCEDURE — P9047 ALBUMIN (HUMAN), 25%, 50ML: HCPCS | Mod: JZ,JG | Performed by: PSYCHIATRY & NEUROLOGY

## 2023-06-16 PROCEDURE — 99291 CRITICAL CARE FIRST HOUR: CPT | Mod: GC,,, | Performed by: PSYCHIATRY & NEUROLOGY

## 2023-06-16 PROCEDURE — 25000242 PHARM REV CODE 250 ALT 637 W/ HCPCS: Performed by: PSYCHIATRY & NEUROLOGY

## 2023-06-16 PROCEDURE — 99900035 HC TECH TIME PER 15 MIN (STAT)

## 2023-06-16 PROCEDURE — 84100 ASSAY OF PHOSPHORUS: CPT

## 2023-06-16 PROCEDURE — P9037 PLATE PHERES LEUKOREDU IRRAD: HCPCS

## 2023-06-16 PROCEDURE — 25000003 PHARM REV CODE 250: Performed by: PSYCHIATRY & NEUROLOGY

## 2023-06-16 PROCEDURE — P9035 PLATELET PHERES LEUKOREDUCED: HCPCS

## 2023-06-16 PROCEDURE — 20000000 HC ICU ROOM

## 2023-06-16 PROCEDURE — 94003 VENT MGMT INPAT SUBQ DAY: CPT

## 2023-06-16 PROCEDURE — 27200966 HC CLOSED SUCTION SYSTEM

## 2023-06-16 PROCEDURE — 83735 ASSAY OF MAGNESIUM: CPT

## 2023-06-16 PROCEDURE — 80076 HEPATIC FUNCTION PANEL: CPT | Performed by: NURSE PRACTITIONER

## 2023-06-16 PROCEDURE — 27000221 HC OXYGEN, UP TO 24 HOURS

## 2023-06-16 PROCEDURE — 94761 N-INVAS EAR/PLS OXIMETRY MLT: CPT

## 2023-06-16 PROCEDURE — 85007 BL SMEAR W/DIFF WBC COUNT: CPT

## 2023-06-16 PROCEDURE — 25000242 PHARM REV CODE 250 ALT 637 W/ HCPCS: Performed by: STUDENT IN AN ORGANIZED HEALTH CARE EDUCATION/TRAINING PROGRAM

## 2023-06-16 PROCEDURE — 85730 THROMBOPLASTIN TIME PARTIAL: CPT | Performed by: PSYCHIATRY & NEUROLOGY

## 2023-06-16 PROCEDURE — 37799 UNLISTED PX VASCULAR SURGERY: CPT

## 2023-06-16 PROCEDURE — 25000003 PHARM REV CODE 250: Performed by: STUDENT IN AN ORGANIZED HEALTH CARE EDUCATION/TRAINING PROGRAM

## 2023-06-16 PROCEDURE — C9113 INJ PANTOPRAZOLE SODIUM, VIA: HCPCS | Performed by: STUDENT IN AN ORGANIZED HEALTH CARE EDUCATION/TRAINING PROGRAM

## 2023-06-16 PROCEDURE — 99900026 HC AIRWAY MAINTENANCE (STAT)

## 2023-06-16 PROCEDURE — 85027 COMPLETE CBC AUTOMATED: CPT

## 2023-06-16 PROCEDURE — 51798 US URINE CAPACITY MEASURE: CPT

## 2023-06-16 PROCEDURE — 85025 COMPLETE CBC W/AUTO DIFF WBC: CPT | Performed by: EMERGENCY MEDICINE

## 2023-06-16 PROCEDURE — 82803 BLOOD GASES ANY COMBINATION: CPT

## 2023-06-16 PROCEDURE — 99291 PR CRITICAL CARE, E/M 30-74 MINUTES: ICD-10-PCS | Mod: GC,,, | Performed by: PSYCHIATRY & NEUROLOGY

## 2023-06-16 PROCEDURE — 25000003 PHARM REV CODE 250

## 2023-06-16 PROCEDURE — 80053 COMPREHEN METABOLIC PANEL: CPT | Performed by: EMERGENCY MEDICINE

## 2023-06-16 PROCEDURE — 63600175 PHARM REV CODE 636 W HCPCS: Performed by: PSYCHIATRY & NEUROLOGY

## 2023-06-16 PROCEDURE — 84145 PROCALCITONIN (PCT): CPT | Performed by: PSYCHIATRY & NEUROLOGY

## 2023-06-16 PROCEDURE — 85610 PROTHROMBIN TIME: CPT | Mod: 91 | Performed by: STUDENT IN AN ORGANIZED HEALTH CARE EDUCATION/TRAINING PROGRAM

## 2023-06-16 PROCEDURE — 94660 CPAP INITIATION&MGMT: CPT | Mod: XB

## 2023-06-16 PROCEDURE — 27000190 HC CPAP FULL FACE MASK W/VALVE

## 2023-06-16 PROCEDURE — 94640 AIRWAY INHALATION TREATMENT: CPT

## 2023-06-16 PROCEDURE — 85384 FIBRINOGEN ACTIVITY: CPT | Mod: 91 | Performed by: STUDENT IN AN ORGANIZED HEALTH CARE EDUCATION/TRAINING PROGRAM

## 2023-06-16 PROCEDURE — 63600175 PHARM REV CODE 636 W HCPCS: Performed by: STUDENT IN AN ORGANIZED HEALTH CARE EDUCATION/TRAINING PROGRAM

## 2023-06-16 PROCEDURE — 25000242 PHARM REV CODE 250 ALT 637 W/ HCPCS

## 2023-06-16 RX ORDER — HYDROCODONE BITARTRATE AND ACETAMINOPHEN 500; 5 MG/1; MG/1
TABLET ORAL
Status: DISCONTINUED | OUTPATIENT
Start: 2023-06-16 | End: 2023-06-17

## 2023-06-16 RX ORDER — IPRATROPIUM BROMIDE AND ALBUTEROL SULFATE 2.5; .5 MG/3ML; MG/3ML
3 SOLUTION RESPIRATORY (INHALATION) EVERY 4 HOURS
Status: DISCONTINUED | OUTPATIENT
Start: 2023-06-17 | End: 2023-06-19

## 2023-06-16 RX ORDER — FUROSEMIDE 10 MG/ML
20 INJECTION INTRAMUSCULAR; INTRAVENOUS ONCE
Status: COMPLETED | OUTPATIENT
Start: 2023-06-17 | End: 2023-06-17

## 2023-06-16 RX ORDER — IPRATROPIUM BROMIDE AND ALBUTEROL SULFATE 2.5; .5 MG/3ML; MG/3ML
3 SOLUTION RESPIRATORY (INHALATION) EVERY 6 HOURS
Status: DISCONTINUED | OUTPATIENT
Start: 2023-06-16 | End: 2023-06-16

## 2023-06-16 RX ORDER — ACETYLCYSTEINE 100 MG/ML
4 SOLUTION ORAL; RESPIRATORY (INHALATION) EVERY 6 HOURS
Status: DISCONTINUED | OUTPATIENT
Start: 2023-06-17 | End: 2023-06-19

## 2023-06-16 RX ORDER — FUROSEMIDE 10 MG/ML
20 INJECTION INTRAMUSCULAR; INTRAVENOUS ONCE
Status: COMPLETED | OUTPATIENT
Start: 2023-06-16 | End: 2023-06-16

## 2023-06-16 RX ORDER — ACETYLCYSTEINE 100 MG/ML
4 SOLUTION ORAL; RESPIRATORY (INHALATION) EVERY 6 HOURS
Status: DISCONTINUED | OUTPATIENT
Start: 2023-06-16 | End: 2023-06-16

## 2023-06-16 RX ORDER — ALBUMIN HUMAN 250 G/1000ML
12.5 SOLUTION INTRAVENOUS ONCE
Status: COMPLETED | OUTPATIENT
Start: 2023-06-16 | End: 2023-06-16

## 2023-06-16 RX ADMIN — MELATONIN TAB 3 MG 6 MG: 3 TAB at 08:06

## 2023-06-16 RX ADMIN — Medication 100 MG: at 08:06

## 2023-06-16 RX ADMIN — RACEPINEPHRINE HYDROCHLORIDE 0.5 ML: 11.25 SOLUTION RESPIRATORY (INHALATION) at 04:06

## 2023-06-16 RX ADMIN — PANTOPRAZOLE SODIUM 40 MG: 40 INJECTION, POWDER, FOR SOLUTION INTRAVENOUS at 08:06

## 2023-06-16 RX ADMIN — Medication 62.5 MCG/HR: at 09:06

## 2023-06-16 RX ADMIN — VANCOMYCIN HYDROCHLORIDE 1500 MG: 1.5 INJECTION, POWDER, LYOPHILIZED, FOR SOLUTION INTRAVENOUS at 11:06

## 2023-06-16 RX ADMIN — FOLIC ACID 1 MG: 1 TABLET ORAL at 08:06

## 2023-06-16 RX ADMIN — POTASSIUM & SODIUM PHOSPHATES POWDER PACK 280-160-250 MG 2 PACKET: 280-160-250 PACK at 11:06

## 2023-06-16 RX ADMIN — ACETYLCYSTEINE 4 ML: 100 INHALANT RESPIRATORY (INHALATION) at 08:06

## 2023-06-16 RX ADMIN — IPRATROPIUM BROMIDE AND ALBUTEROL SULFATE 3 ML: 2.5; .5 SOLUTION RESPIRATORY (INHALATION) at 08:06

## 2023-06-16 RX ADMIN — OLANZAPINE 5 MG: 2.5 TABLET, FILM COATED ORAL at 08:06

## 2023-06-16 RX ADMIN — RIFAXIMIN 550 MG: 550 TABLET ORAL at 08:06

## 2023-06-16 RX ADMIN — FUROSEMIDE 20 MG: 10 INJECTION, SOLUTION INTRAMUSCULAR; INTRAVENOUS at 11:06

## 2023-06-16 RX ADMIN — ALBUMIN HUMAN 12.5 G: 0.25 SOLUTION INTRAVENOUS at 11:06

## 2023-06-16 RX ADMIN — INSULIN ASPART 2 UNITS: 100 INJECTION, SOLUTION INTRAVENOUS; SUBCUTANEOUS at 05:06

## 2023-06-16 RX ADMIN — INSULIN ASPART 4 UNITS: 100 INJECTION, SOLUTION INTRAVENOUS; SUBCUTANEOUS at 11:06

## 2023-06-16 RX ADMIN — ARIPIPRAZOLE 5 MG: 5 TABLET ORAL at 08:06

## 2023-06-16 RX ADMIN — PIPERACILLIN SODIUM AND TAZOBACTAM SODIUM 4.5 G: 4; .5 INJECTION, POWDER, LYOPHILIZED, FOR SOLUTION INTRAVENOUS at 11:06

## 2023-06-16 RX ADMIN — LEVETIRACETAM 500 MG: 500 SOLUTION ORAL at 08:06

## 2023-06-16 RX ADMIN — INSULIN ASPART 4 UNITS: 100 INJECTION, SOLUTION INTRAVENOUS; SUBCUTANEOUS at 06:06

## 2023-06-16 RX ADMIN — POTASSIUM & SODIUM PHOSPHATES POWDER PACK 280-160-250 MG 2 PACKET: 280-160-250 PACK at 06:06

## 2023-06-16 NOTE — PROGRESS NOTES
[facility] - Neuro Critical Care  Adult Nutrition  Progress Note    SUMMARY       Recommendations    1. Continue TF regimen of Impact Peptide 1.5 @ 40 ml/hr to provide 1440 kcal, 90 g pro, 739 ml water.   2. RD following.     Goals: Meet % EEN by RD f/u.  Nutrition Goal Status: goal met  Communication of RD Recs: other (comment) (POC)    Assessment and Plan    Severe protein-calorie malnutrition  Malnutrition Type:  Context: social/environmental circumstances  Level: severe    Related to (etiology):   H/o ETOH abuse     Signs and Symptoms (as evidenced by):   Findings of NFPE, wt loss, edema present, and inadequate protein-energy intake     Malnutrition Characteristic Summary:  Weight Loss (Malnutrition):  (23% x 4 months)  Energy Intake (Malnutrition): less than or equal to 75% for greater than or equal to 1 month  Subcutaneous Fat (Malnutrition): severe depletion (suspecting)  Muscle Mass (Malnutrition): severe depletion  Fluid Accumulation (Malnutrition): other (see comments) (mild-moderate)    Interventions/Recommendations (treatment strategy):  1. Continue TF regimen of Impact Peptide 1.5 @ 40 ml/hr to provide 1440 kcal, 90 g pro, 739 ml water.   2. RD following.     Nutrition Diagnosis Status:   Continues    Malnutrition Assessment  Malnutrition Context: social/environmental circumstances  Malnutrition Level: severe          Weight Loss (Malnutrition):  (23% x 4 months)  Energy Intake (Malnutrition): less than or equal to 75% for greater than or equal to 1 month  Subcutaneous Fat (Malnutrition): severe depletion (suspecting)  Muscle Mass (Malnutrition): severe depletion  Fluid Accumulation (Malnutrition): other (see comments) (mild-moderate)   Orbital Region (Subcutaneous Fat Loss): severe depletion  Upper Arm Region (Subcutaneous Fat Loss): severe depletion   Jbphh Region (Muscle Loss): severe depletion  Clavicle Bone Region (Muscle Loss): severe depletion  Clavicle and Acromion Bone Region (Muscle  "Loss): severe depletion  Scapular Bone Region (Muscle Loss): severe depletion  Patellar Region (Muscle Loss): severe depletion  Anterior Thigh Region (Muscle Loss): severe depletion  Posterior Calf Region (Muscle Loss): severe depletion     Reason for Assessment    Reason For Assessment: RD follow-up  Diagnosis: other (see comments) (Non-convulsive status epilepticus)  Relevant Medical History: addiction to drugs, ETOH abuse, anemia, anxiety, behavioral disorder, bipolar disorder, cirrhosis, hypotension, glaucoma, hx of seizure  Interdisciplinary Rounds: did not attend  General Information Comments: RD f/u. Unable to speak with pt 2/2 remaining intubated. RD suspecting poor PO intake PTA. NPO- noted TF regimen currently running.  Wt on 5/5/23 was 99# and # - RD suspecting wt gain 2/2 +3 (moderate) edema in arms present and +2 (mild) edema present in BLEs. Wt on 2/8/23 was 129#, using wt of 99# from 5/5/23- indicates 23% wt loss x 4 months. NFPE completed 5/25 and found severe fat and muscle wasting. Severe malnutrition dxn continues. LBM 6/16.  Nutrition Discharge Planning: Pending clinical course    Nutrition Risk Screen    Nutrition Risk Screen: tube feeding or parenteral nutrition, difficulty chewing/swallowing, unintentional loss of 10 lbs or more in the past 2 months    Nutrition/Diet History    Spiritual, Cultural Beliefs, Synagogue Practices, Values that Affect Care: no  Food Allergies: NKFA  Factors Affecting Nutritional Intake: NPO, on mechanical ventilation, excessive alcohol intake, difficulty/impaired swallowing    Anthropometrics    Temp: (!) 101.5 °F (38.6 °C)  Height: 5' 2" (157.5 cm)  Height (inches): 62 in  Weight Method: Bed Scale  Weight: 59.9 kg (132 lb)  Weight (lb): 132 lb  Ideal Body Weight (IBW), Female: 110 lb  % Ideal Body Weight, Female (lb): 120 %  BMI (Calculated): 24.1  BMI Grade: 18.5-24.9 - normal    Lab/Procedures/Meds    Pertinent Labs Reviewed: reviewed  Pertinent Labs " Comments: Sodium 149, Creatinine 0.4, Glucose 174, Calcium 8.2, Phos 2.3, Albumin 2.3, T Bili 4.2, A1C <4.0  Pertinent Medications Reviewed: reviewed  Pertinent Medications Comments: folic acid, pantoprazole, thiamine, fentanyl    Estimated/Assessed Needs    Weight Used For Calorie Calculations: 59.9 kg (132 lb 0.9 oz)  Energy Calorie Requirements (kcal): 1509 kcal  Energy Need Method: [place] (x 1.3 SF)  Protein Requirements: 90 g (1.5 g/kg)  Weight Used For Protein Calculations: 59.9 kg (132 lb 0.9 oz)  Fluid Requirements (mL): 1 mL/kcal or fluid per MD  Estimated Fluid Requirement Method: RDA Method  RDA Method (mL): 1509    Nutrition Prescription Ordered    Current Diet Order: NPO  Current Nutrition Support Formula Ordered: Impact Peptide 1.5  Current Nutrition Support Rate Ordered: 40 (ml)  Current Nutrition Support Frequency Ordered: mL/hr    Evaluation of Received Nutrient/Fluid Intake    Enteral Calories (kcal): 1440  Enteral Protein (gm): 90  Enteral (Free Water) Fluid (mL): 739  % Kcal Needs: 95%  % Protein Needs: 100%  I/O: +8.0 L since admit  Energy Calories Required: meeting needs  Protein Required: meeting needs  Tolerance: tolerating  % Intake of Estimated Energy Needs: 95%  % Meal Intake: NPO    Nutrition Risk    Level of Risk/Frequency of Follow-up:  (1 time/week)     Monitor and Evaluation    Food and Nutrient Intake: enteral nutrition intake  Food and Nutrient Adminstration: enteral and parenteral nutrition administration  Knowledge/Beliefs/Attitudes: food and nutrition knowledge/skill, beliefs and attitudes  Physical Activity and Function: factors affecting access to physical activity  Anthropometric Measurements: height/length, weight, weight change, body mass index  Biochemical Data, Medical Tests and Procedures: electrolyte and renal panel, gastrointestinal profile, glucose/endocrine profile, inflammatory profile, lipid profile  Nutrition-Focused Physical Findings: overall appearance,  extremities, muscles and bones, head and eyes, skin     Nutrition Follow-Up    RD Follow-up?: Yes    Nuzhat Ayala RDN,LDN

## 2023-06-16 NOTE — ASSESSMENT & PLAN NOTE
Notified Resident at 1220 regarding lab results.      Spoke with: Alvin    Orders were not obtained.    Comments: Glucose and CBG results and FO2 30%         Nutrition consulted. Most recent weight and BMI monitored-     Measurements:  Wt Readings from Last 1 Encounters:   06/03/23 59.9 kg (132 lb)   Body mass index is 24.14 kg/m².    Patient has been screened and assessed by RD.    Malnutrition Type:  Context: social/environmental circumstances  Level: severe    Malnutrition Characteristic Summary:  Weight Loss (Malnutrition):  (23% x 4 months)  Energy Intake (Malnutrition): less than or equal to 75% for greater than or equal to 1 month  Subcutaneous Fat (Malnutrition): severe depletion (suspecting)  Muscle Mass (Malnutrition): severe depletion  Fluid Accumulation (Malnutrition): other (see comments) (mild-moderate)    Interventions/Recommendations (treatment strategy):  1. Recommend goal of Impact Peptide 1.5 @ 40 ml/hr to provide 1440 kcal, 90 g pro, 739 ml water. Advance/adjust as appropriate. 2. Bolus TF rec: Impact Peptide 1.5 4 cans per day to provide 1500 kcal, 94 g pro, 772 mL water. 3. RD following.

## 2023-06-16 NOTE — SIGNIFICANT EVENT
1545 - NCC team and RT Lita at bedside preparing pt to be extubated, glydescope and BiPaP at bedside.   1547 - cuff deflated. No cuff leak noted  1548 - cuff leak present. OK by MD to proceed to extubate.   1549 - extubated, pt alert and following commands. SpO2 86% on the monitor. BiPaP placed - iPaP 10, ePaP 5, FiO2 60%, RR 14. Racemic treatment given. SpO2 96% on the monitor. VSS.

## 2023-06-16 NOTE — CARE UPDATE
Pt extubated per MD order, team at bedside. Placed pt on BiPAP at documented settings. Aerosol treatment given. Pt tolerating extubation well. No respiratory distress noted. Will continue to monitor.

## 2023-06-16 NOTE — PLAN OF CARE
[facility] Care Plan    POC reviewed with Marely Hamilton and family at 0300. Pt verbalized understanding. Questions and concerns addressed. No acute events overnight. Pt progressing toward goals. Will continue to monitor. See below and flowsheets for full assessment and VS info.     -Fentanyl @ 62.5  -Saldaña in place; U.O. 30-50mL/hr  -Prn melatonin given x1  -TF @ 40 (goal); tolerating well    Is this a stroke patient? no    Neuro:  Cheryl Coma Scale  Best Eye Response: 4-->(E4) spontaneous  Best Motor Response: 6-->(M6) obeys commands  Best Verbal Response: 1-->(V1) none  Cheryl Coma Scale Score: 11  Assessment Qualifiers: patient intubated, no eye obstruction present  Pupil PERRLA: no (baseline)     24hr Temp:  [98.9 °F (37.2 °C)-100.2 °F (37.9 °C)]     CV:   Rhythm: normal sinus rhythm  BP goals:   SBP < 180  MAP > 55    Resp:      Vent Mode: A/C  Set Rate: 12 BPM  Oxygen Concentration (%): 40  Vt Set: 340 mL  PEEP/CPAP: 5 cmH20  Pressure Support: 5 cmH20    Plan: wean to extubate    GI/:     Diet/Nutrition Received: NPO, tube feeding  Last Bowel Movement: 06/15/23  Voiding Characteristics: urethral catheter (bladder)    Intake/Output Summary (Last 24 hours) at 6/16/2023 0517  Last data filed at 6/16/2023 0401  Gross per 24 hour   Intake 1193.49 ml   Output 1585 ml   Net -391.51 ml     Unmeasured Output  Urine Occurrence: 1  Stool Occurrence: 1  Emesis Occurrence: 0  Pad Count: 1    Labs/Accuchecks:  Recent Labs   Lab 06/15/23  0333 06/16/23  0231   WBC 4.65 2.27*   RBC 2.59* 2.46*   HGB 7.7* 7.3*   HCT 24.0* 23.6*   PLT 56*  --       Recent Labs   Lab 06/16/23  0231   *   K 3.9   CO2 27   *   BUN 15   CREATININE 0.4*   ALKPHOS 127   ALT 19   AST 28   BILITOT 4.2*      Recent Labs   Lab 06/15/23  1809   INR 1.9*   APTT 31.6      Recent Labs   Lab 06/13/23  1533   TROPONINI 0.029*       Electrolytes: Electrolytes replaced  Accuchecks: Q6H    Gtts:   fentanyl 62.5 mcg/hr (06/16/23 0401)        LDA/Wounds:  Lines/Drains/Airways       Central Venous Catheter Line  Duration             Trialysis (Dialysis) Catheter 06/11/23 1300 right internal jugular 4 days              Drain  Duration                  NG/OG Tube 06/01/23 2200 Right nostril 14 days         Rectal Tube 06/03/23 1300 12 days         Urethral Catheter 06/12/23 1405 3 days              Airway  Duration                  Airway - Non-Surgical 06/11/23 1643 4 days              Arterial Line  Duration             Arterial Line 06/11/23 1300 Left Femoral 4 days              Peripheral Intravenous Line  Duration                  Midline Catheter Insertion/Assessment  - Single Lumen 05/25/23 1005 Right basilic vein (medial side of arm) other (see comments) 21 days         Peripheral IV - Single Lumen 06/15/23 1229 20 G;1 3/4 in Right Lower leg <1 day                  Wounds: Yes  Wound care consulted: Yes

## 2023-06-16 NOTE — ASSESSMENT & PLAN NOTE
58F with EtOH induced hepatic cirrhosis, seizure disorder on outpatient LEV and ZNS and bipolar disorder admitted on 5/28 for persistent encephalopathy.    - UTI on admit (Proteus mirabilis), now s/p CTX course  - Hypercalcemia 2/2 lithium toxicity (Lithium changed to Abilify per Psych)  - Hepatic encephalopathy 2/2 to medication non compliance, treated with lactulose and rifaximin     MRI Brain WO was unremarkable for acute neurologic change  EEG w/ frequent left hemispheric electrographic seizures and NCSE  Repeat EEGs without seizure activity x 24 hours, now resolved    6/11 --> acute hypotension, hypoxia --> hypovolemic shock --> Large R peritoneal hematoma --> IR for class A embo --> s/p IR embolization of left lumbar and internal iliac branch foci of arterial extravasation  6/13 --> episode of hypoxia and SOB --> CTA PE without evidence of PE, CT C/A/P without new bleed    - Continued admission to [facility]  - Q1h neurochecks while in ICU  - Q1h vitals while in ICU  - HOB@30  - Keppra 500mg BID  - Thiamine replacement   - Lactulose, titrate to BM  - MAP>65  - Daily CMP, Mag, Phos - replete electrolytes PRN  - Lipid panel, A1c, Coags reviewed  - Q8 CBC, transfuse PRN and replace Plts <50  - BID fibrinogen/PT-INR  - Heparin held in setting of acute bleed, IVC filter in place  - PT/OT/SLP as appropriate  - CM/SW consult for dispo planning

## 2023-06-16 NOTE — PLAN OF CARE
[facility] Neuro Critical Care  Discharge Reassessment    Primary Care Provider: Viktor Ross MD    Expected Discharge Date: 6/21/2023    Patient intubated on vent.  Not medically stable for discharge.  Daily SBT per MD     fentanyl 12.5 mcg/hr (06/16/23 1105)        Reassessment (most recent)       Discharge Reassessment - 06/16/23 1231          Discharge Reassessment    Assessment Type Discharge Planning Reassessment     Did the patient's condition or plan change since previous assessment? No     Communicated BRAYAN with patient/caregiver Date not available/Unable to determine     Discharge Plan A Long-term acute care facility (LTAC)     Discharge Plan B Rehab     DME Needed Upon Discharge  none     Transition of Care Barriers None     Why the patient remains in the hospital Requires continued medical care                     Dixie Faith RN, CCRN-K, [facility]  Neuro-Critical Care   [phone]

## 2023-06-16 NOTE — PLAN OF CARE
[facility] Care Plan    POC reviewed with Marely Hamilton and family at 1700. Pt indicated understanding and sister opal verbalized understanding. Questions and concerns addressed. No acute events today. Pt progressing toward goals. Will continue to monitor. See below and flowsheets for full assessment and VS info.     - Albumin and lasix 20mg given this morning with good response  - extubated to Bipap today 60%-- tolerating well  - Fentanyl and TF stopped for extubation  - Saldaña removed- pure wick placed  - MD wants trialysis line out however, ok to do tomorrow per Pepper and carleen as patient not ready to lay flat for line pull  - NGT remains in place and pt has good seal on BiPap with <10L leak  - Sister Opal update numerous times throughout shift.       Is this a stroke patient? no    Neuro:  Cheryl Coma Scale  Best Eye Response: 4-->(E4) spontaneous  Best Motor Response: 6-->(M6) obeys commands  Best Verbal Response: 1-->(V1) none  Levant Coma Scale Score: 11  Assessment Qualifiers: patient intubated  Pupil PERRLA: no (baseline)     24 hr Temp:  [99 °F (37.2 °C)-102.2 °F (39 °C)]     CV:   Rhythm: normal sinus rhythm  BP goals:   SBP < 180  MAP > 65    Resp:      Vent Mode: Spont  Set Rate: 12 BPM  Oxygen Concentration (%): 60  Vt Set: 340 mL  PEEP/CPAP: 5 cmH20  Pressure Support: 10 cmH20    Plan: wean to extubate    GI/:     Diet/Nutrition Received: NPO, tube feeding  Last Bowel Movement: 06/16/23  Voiding Characteristics: urethral catheter (bladder)    Intake/Output Summary (Last 24 hours) at 6/16/2023 1831  Last data filed at 6/16/2023 1828  Gross per 24 hour   Intake 1663.75 ml   Output 1840 ml   Net -176.25 ml     Unmeasured Output  Urine Occurrence: 1  Stool Occurrence: 1  Emesis Occurrence: 0  Pad Count: 1    Labs/Accuchecks:  Recent Labs   Lab 06/16/23  0231   WBC 2.27*   RBC 2.46*   HGB 7.3*   HCT 23.6*   PLT 37*      Recent Labs   Lab 06/16/23  0231   *   K 3.9   CO2 27   *   BUN 15    CREATININE 0.4*   ALKPHOS 127   ALT 19   AST 28   BILITOT 4.2*      Recent Labs   Lab 06/16/23  0804 06/16/23  1729   INR 1.8* 1.8*   APTT 30.5  --       Recent Labs   Lab 06/13/23  1533   TROPONINI 0.029*       Electrolytes: Electrolytes replaced  Accuchecks: Q6H    Gtts:   fentanyl Stopped (06/16/23 1539)       LDA/Wounds:  Lines/Drains/Airways       Central Venous Catheter Line  Duration             Trialysis (Dialysis) Catheter 06/11/23 1300 right internal jugular 5 days              Drain  Duration                  NG/OG Tube 06/01/23 2200 Right nostril 14 days         Rectal Tube 06/03/23 1300 13 days    Female External Urinary Catheter 06/16/23 1505 <1 day              Arterial Line  Duration             Arterial Line 06/11/23 1300 Left Femoral 5 days              Peripheral Intravenous Line  Duration                  Midline Catheter Insertion/Assessment  - Single Lumen 05/25/23 1005 Right basilic vein (medial side of arm) other (see comments) 22 days         Peripheral IV - Single Lumen 06/15/23 1229 20 G;1 3/4 in Right Lower leg 1 day                  Wounds: Yes  Wound care consulted: Yes

## 2023-06-16 NOTE — SUBJECTIVE & OBJECTIVE
Interval History:  As above    Review of Systems   Unable to perform ROS: Intubated     Objective:     Vitals:  Temp: (!) 101.5 °F (38.6 °C)  Pulse: 101  Rhythm: sinus tachycardia  BP: (!) 146/69  MAP (mmHg): 99  Resp: (!) 22  SpO2: 96 %  Oxygen Concentration (%): 40  Vent Mode: Spont  Set Rate: 12 BPM  Pressure Support: 10 cmH20  PEEP/CPAP: 5 cmH20  Peak Airway Pressure: 15 cmH20  Mean Airway Pressure: 7.4 cmH20  Plateau Pressure: 0 cmH20    Temp  Min: 99 °F (37.2 °C)  Max: 102.2 °F (39 °C)  Pulse  Min: 91  Max: 158  BP  Min: 117/59  Max: 165/72  MAP (mmHg)  Min: 82  Max: 107  Resp  Min: 12  Max: 26  SpO2  Min: 93 %  Max: 100 %  Oxygen Concentration (%)  Min: 40  Max: 40    06/15 0701 - 06/16 0700  In: 1393.5 [I.V.:103.5]  Out: 1590 [Urine:1390]   Unmeasured Output  Urine Occurrence: 1  Stool Occurrence: 1  Emesis Occurrence: 0  Pad Count: 1        Physical Exam  Vitals and nursing note reviewed.   Constitutional:       General: She is not in acute distress.     Appearance: She is ill-appearing.      Comments: Opens eye spontaneously, following commands   HENT:      Head: Normocephalic and atraumatic.   Eyes:      General: Scleral icterus present.      Extraocular Movements: Extraocular movements intact.      Pupils: Pupils are equal, round, and reactive to light.   Cardiovascular:      Rate and Rhythm: Normal rate.   Pulmonary:      Comments:   Intubated and mechanically ventilated  Abdominal:      Palpations: Abdomen is distended but soft  Musculoskeletal:      Cervical back: Neck supple.   Skin:     General: Skin is warm.   Neurological:      Mental Status: She is alert.      Comments:   Alert  Intubated, unable to test orientation but nods appropriately and mouths words  Follows commands x4  Pupils equal, reactive  Face grossly symmetric   Moves all extremities antigravity and to commands    Medications:  Continuousfentanyl, Last Rate: 12.5 mcg/hr (06/16/23 1305)    ScheduledARIPiprazole, 5 mg, Daily  folic  acid, 1 mg, Daily  levetiracetam, 500 mg, BID  melatonin, 6 mg, Nightly  OLANZapine, 5 mg, BID  pantoprazole, 40 mg, BID  rifAXIMin, 550 mg, BID  thiamine, 100 mg, Daily    PRNsodium chloride, , Q24H PRN  sodium chloride, , Q24H PRN  aluminum-magnesium hydroxide-simethicone, 30 mL, QID PRN  dextrose 10%, 12.5 g, PRN  dextrose 10%, 25 g, PRN  dextrose, 15 g, PRN  dextrose, 30 g, PRN  fentaNYL, 50 mcg, Q1H PRN  glucagon (human recombinant), 1 mg, PRN  insulin aspart U-100, 1-10 Units, Q6H PRN  labetalol, 10 mg, Q4H PRN  magnesium oxide, 800 mg, PRN  magnesium oxide, 800 mg, PRN  naloxone, 0.02 mg, PRN  ondansetron, 4 mg, Q8H PRN  potassium bicarbonate, 35 mEq, PRN  potassium bicarbonate, 50 mEq, PRN  potassium bicarbonate, 60 mEq, PRN  potassium, sodium phosphates, 2 packet, PRN  potassium, sodium phosphates, 2 packet, PRN  potassium, sodium phosphates, 2 packet, PRN  simethicone, 1 tablet, QID PRN  sodium chloride 0.9%, 10 mL, Q8H PRN      Today I personally reviewed pertinent medications, imaging, laboratory results, notably:    Hemoglobin stable  Plt 37    Diet  Diet NPO  Diet NPO

## 2023-06-16 NOTE — PROGRESS NOTES
[facility] - Neuro Critical Care  Neurocritical Care  Progress Note    Admit Date: 5/28/2023  Service Date: 06/16/2023  Length of Stay: 19    Subjective:     Chief Complaint: Non-convulsive status epilepticus    History of Present Illness: Marely Hamilton is a 58 year old female with a medical history significant for EtOH induced hepatic cirrhosis, seizure disorder on outpatient LEV and ZNS and bipolar disorder who [place] [facility] [place] a transfer from OSH for evaluation of persistent encephalopathy concerning for NCSE vs hepatic encephalopathy. History is obtained from chart as patient is unresponsive and unable to assist. Initially presented to [facility] on 5/18 for encephalopathy and thought to be multifactorial including UTI (Proteus mirabilis s/p CTX course), hypercalcemia 2/2 lithium toxicity (Lithium changed to Abilify per Psych), as well as hepatic encephalopathy secondary to medication non compliance. She was treated with lactulose and rifaximin with no improvement in her mental status. EEG showed generalized slowing as well as triphasic discharges in the left hemisphere. MRI Brain WO was unremarkable for acute neurologic change. Decision was made [place] [facility] and ongoing evaluation and management. On arrival, mental status exam has waxed and waned with patient being intermittently verbal and following commands. NH4 48.    NCC is consulted on hospital day 4 for admission after EEG showed frequent left hemispheric electrographic seizures lasting on average 10-30 seconds with prolonged seizures over 1.5 hours also noted. Per chart review, patient home dose of LEV increased from 1000mg to 1500mg BID, ZNS increased to 200mg. Vimpat 150mg BID added per General Neurology (had not been given prior to consult). On initial evaluation, patient is not responsive to voice or pain. Upward gaze noted. Decision made to transfer patient to [facility] for higher level of care and  airway watch.       Hospital Course: 06/02/2023 Tachycardic and hypotensive, transferred for development of mostly right hemispheric status, LOC exam remains poor  Intubated for airway protection, EEG has changed to mostly include triphasics with no definite seizure activity per Dr Boss, propofol stopped and AEDs simplified to keppra 1000mg bid only, wean off pressor, give colloids with crystalloid resuscitation  06/03/2023: remains on persistant significant pressor support despite adequate hydration, problems with third spacing and may have pulmonary hypertension as well, consider trial of stress steroids if hgb stabilizes  06/04/2023: levo down to 0.08mcg, start and advance TFs, vaso at 0.04U, ammonia has been stable, slight rise in tbili, check direct bili, hgb and plts improved, no clear source of bleeding, no source of bleeding on CT abdomen, consider superimposed hemolysis  06/05/2023 NAEON. Neurologic exam continues to improve, following commands in all extremities. Levo 0.02, weaning as able. Vaso discontinued, MAP>65. Daily SBT, monitor and hold TF at midnight for possible extubation tomorrow. Hemolysis work up ongoing, trending CBC. Continue CTX for SBP prophylaxis.   06/06/2023 Awake and following commands. SBT with low tidal volumes. Remains intubated for airway protection at this time with possible extubation tomorrow with continued neurologic improvement. Levo discontinued, maintaining SBP<65. Concern for hemolytic anemia related to autoimmune process vs hepatic dysfunction (elevated reticulocyte count and decreased haptoglobin).  06/07/2023 Rested on AC overnight due to low TV and fatigue. SBT with pressure support 10/5 this am, weaning ventilator as tolerated. Continue tube feeds with goal to optimize nutrition. Ammonia elevated, continue lactulose to encourage bowel movement.   06/08/2023 Failed SBT due to apnea. Some breaths on SIMV. Awake and following commands. Ammonia trending down (44) with  BMs, continue lactulose. Advance nutritional support with goal to increase strength towards extubation. Plt transfusions PRN. Cryo for fibrinogen <100.  06/09/2023 Venous US with R brachial and femoral DVT. Dicussed with Hematology with recommendation to start Argatroban with plts>50. Daily SBT with apnea, maintain on SIMV with backup rate of 10. Hyperammonemia resolved.   06/10/2023 patient is more alert and awake, tolerated SBT. Hematology agreed to switch to heparin for DVT given negative for HIT  06/11/2023   On AM rounds, patient was noted to be hypoxic and tachycardic. Hypoxia resolved with ventilator changes but patient continued to appear uncomfortable. Patient lied flat with noted improvement in oxygenation. Patient became acutely hypotensive, tachycardiac and hypoxic not responsive to ventilator changes. IVF bolus + Burton bump x3 + initiation of vasopressin due to concern for hypovolemic shock. Levophed added due to persistent hypertension. L femoral arterial line and R IJ trialysis placed emergently. STAT Hemoglobin 4.4. Received protamine for heparin reversal, 3u Plts and 3 FFP and 2 pRBC. STAT CTA abdo/pelvis with L retroperitoneal hematoma with + spot sign. IR consulted and patient taken class A for diagnostic angiogram with possible emobolization. Pressors weaned after volume resuscitation. Family updated over phone.   06/12/2023 s/p IR embolization of left lumbar and internal iliac branch foci of arterial extravasation. Hemoglobin stable, continue to trend CBC q8 with transfusion of pRBC for Hg<7. Restart trickle feeds. Albumin and lasix for dieresis. Low threshold for antibiotics given risk of SBP and RP hematoma. Unlikely to tolerate AC, will discuss placement of IVC filter with IR.   06/13/2023 Attempted to wean FiO2 overnight with noted drop of pO2. FiO2 increased to 55% and patient received albumin and was diuresed. Weaning this am with ABGs. Remains on fentanyl 12.5, neurologic exam unchanged. Hg  stable. Post AM rounds, patient with acute increase in O2 needs, STAT CTA chest/abd/pelvis to assess for PE vs further hemorrhage stable and without new findings. Continue current management.   06/14/2023 NAEON. Hg stable at 7.6. Plt 44, transfuse for <50. Pending IVC filter per IR for DVT burden with contraindications to AC.  06/15/2023 s/p IVC filter placement. Daily SBT, complicated by anxiety. Remains on spontaneous mode, 10/5, 40% FiO2 with goal to wean as able.   06/16/2023 Rested on AC overnight. Plts low, received 1 pack overnight. Daily SBT with plan to extubate as able.       Interval History:  As above    Review of Systems   Unable to perform ROS: Intubated     Objective:     Vitals:  Temp: (!) 101.5 °F (38.6 °C)  Pulse: 101  Rhythm: sinus tachycardia  BP: (!) 146/69  MAP (mmHg): 99  Resp: (!) 22  SpO2: 96 %  Oxygen Concentration (%): 40  Vent Mode: Spont  Set Rate: 12 BPM  Pressure Support: 10 cmH20  PEEP/CPAP: 5 cmH20  Peak Airway Pressure: 15 cmH20  Mean Airway Pressure: 7.4 cmH20  Plateau Pressure: 0 cmH20    Temp  Min: 99 °F (37.2 °C)  Max: 102.2 °F (39 °C)  Pulse  Min: 91  Max: 158  BP  Min: 117/59  Max: 165/72  MAP (mmHg)  Min: 82  Max: 107  Resp  Min: 12  Max: 26  SpO2  Min: 93 %  Max: 100 %  Oxygen Concentration (%)  Min: 40  Max: 40    06/15 0701 - 06/16 0700  In: 1393.5 [I.V.:103.5]  Out: 1590 [Urine:1390]   Unmeasured Output  Urine Occurrence: 1  Stool Occurrence: 1  Emesis Occurrence: 0  Pad Count: 1        Physical Exam  Vitals and nursing note reviewed.   Constitutional:       General: She is not in acute distress.     Appearance: She is ill-appearing.      Comments: Opens eye spontaneously, following commands   HENT:      Head: Normocephalic and atraumatic.   Eyes:      General: Scleral icterus present.      Extraocular Movements: Extraocular movements intact.      Pupils: Pupils are equal, round, and reactive to light.   Cardiovascular:      Rate and Rhythm: Normal rate.   Pulmonary:       Comments:   Intubated and mechanically ventilated  Abdominal:      Palpations: Abdomen is distended but soft  Musculoskeletal:      Cervical back: Neck supple.   Skin:     General: Skin is warm.   Neurological:      Mental Status: She is alert.      Comments:   Alert  Intubated, unable to test orientation but nods appropriately and mouths words  Follows commands x4  Pupils equal, reactive  Face grossly symmetric   Moves all extremities antigravity and to commands    Medications:  Continuousfentanyl, Last Rate: 12.5 mcg/hr (06/16/23 1305)    ScheduledARIPiprazole, 5 mg, Daily  folic acid, 1 mg, Daily  levetiracetam, 500 mg, BID  melatonin, 6 mg, Nightly  OLANZapine, 5 mg, BID  pantoprazole, 40 mg, BID  rifAXIMin, 550 mg, BID  thiamine, 100 mg, Daily    PRNsodium chloride, , Q24H PRN  sodium chloride, , Q24H PRN  aluminum-magnesium hydroxide-simethicone, 30 mL, QID PRN  dextrose 10%, 12.5 g, PRN  dextrose 10%, 25 g, PRN  dextrose, 15 g, PRN  dextrose, 30 g, PRN  fentaNYL, 50 mcg, Q1H PRN  glucagon (human recombinant), 1 mg, PRN  insulin aspart U-100, 1-10 Units, Q6H PRN  labetalol, 10 mg, Q4H PRN  magnesium oxide, 800 mg, PRN  magnesium oxide, 800 mg, PRN  naloxone, 0.02 mg, PRN  ondansetron, 4 mg, Q8H PRN  potassium bicarbonate, 35 mEq, PRN  potassium bicarbonate, 50 mEq, PRN  potassium bicarbonate, 60 mEq, PRN  potassium, sodium phosphates, 2 packet, PRN  potassium, sodium phosphates, 2 packet, PRN  potassium, sodium phosphates, 2 packet, PRN  simethicone, 1 tablet, QID PRN  sodium chloride 0.9%, 10 mL, Q8H PRN      Today I personally reviewed pertinent medications, imaging, laboratory results, notably:    Hemoglobin stable  Plt 37    Diet  Diet NPO  Diet NPO          Assessment/Plan:     Neuro  * Non-convulsive status epilepticus  58F with EtOH induced hepatic cirrhosis, seizure disorder on outpatient LEV and ZNS and bipolar disorder admitted on 5/28 for persistent encephalopathy.    - UTI on admit (Proteus  mirabilis), now s/p CTX course  - Hypercalcemia 2/2 lithium toxicity (Lithium changed to Abilify per Psych)  - Hepatic encephalopathy 2/2 to medication non compliance, treated with lactulose and rifaximin     MRI Brain WO was unremarkable for acute neurologic change  EEG w/ frequent left hemispheric electrographic seizures and NCSE  Repeat EEGs without seizure activity x 24 hours, now resolved    6/11 --> acute hypotension, hypoxia --> hypovolemic shock --> Large R peritoneal hematoma --> IR for class A embo --> s/p IR embolization of left lumbar and internal iliac branch foci of arterial extravasation  6/13 --> episode of hypoxia and SOB --> CTA PE without evidence of PE, CT C/A/P without new bleed    - Continued admission to [facility]  - Q1h neurochecks while in ICU  - Q1h vitals while in ICU  - HOB@30  - Keppra 500mg BID  - Thiamine replacement   - Lactulose, titrate to BM  - MAP>65  - Daily CMP, Mag, Phos - replete electrolytes PRN  - Lipid panel, A1c, Coags reviewed  - Q8 CBC, transfuse PRN and replace Plts <50  - BID fibrinogen/PT-INR  - Heparin held in setting of acute bleed, IVC filter in place  - PT/OT/SLP as appropriate  - CM/SW consult for dispo planning    Acute encephalopathy  Multifactorial   See Non-convulsive status epilepticus    Breakthrough seizure  See Non-convulsive status epilepticus    Psychiatric  Bipolar 1 disorder  See Non-convulsive status epilepticus    Alcohol abuse, in remission  See Non-convulsive status epilepticus    Pulmonary  Acute hypoxemic respiratory failure  Intubated today for airway protection  Difficult intubation due to far anterior airway  Weaning as able    Cardiac/Vascular  Hemorrhagic shock  RESOLVED  See Hypovolemic shock    Hypovolemic shock  6/11 --> acute hypotension, hypoxia --> hypovolemic shock --> Large R peritoneal hematoma --> IR for class A embo   s/p IR embolization of left lumbar and internal iliac branch foci of arterial extravasation  RESOLVED    Hypotension  (arterial)  Hypotensive 2/2 hypovolemic/hemorrhagic shock on 6/11  CTA with large retroperitoneal hematoma, s/p IR embolization of left lumbar and internal iliac branch foci of arterial extravasation  RESOLVED     Renal/  Hypernatremia-resolved as of 6/15/2023  Daily CMP  Improved with free water administration  RESOLVED     Hematology  Coagulopathy  Heme consulted  Thrombocytopenic and Liver disease     Acute deep vein thrombosis (DVT) of brachial vein of right upper extremity  Noted on 6/8  Argatroban per Heme recs - transitioned to heparin 6/10  HELD 6/11 in setting of retroperitoneal bleed  IVC filter placed 6/14    Deep vein thrombosis (DVT) of femoral vein of right lower extremity  Noted on 6/8  Argatroban per Heme recs - transitioned to heparin  HELD 6/11 in setting of hypovolemic shock 2/2 retroperitoneal hemorrhage   IVC filter placed 6/14    Thrombocytopenia  Likely secondary to hepatic cirrhosis and development of splenomegaly  Has worsened and associated with hgb drop, transfuse to >50K  No signs of bleeding in GI tract or on CT, hemolysis on labs  Hematology consulted, tested negative for HIT    6/11 --> acute hypotension, hypoxia --> hypovolemic shock --> Large R peritoneal hematoma --> IR for class A embo --> s/p IR embolization of left lumbar and internal iliac branch foci of arterial extravasation    Oncology  Anemia  Chronic    Macrocytic anemia  Follow CBC    Endocrine  Hyperammonemia  Stable on current medical regimen   See Non-convulsive status epilepticus      Severe protein-calorie malnutrition  Nutrition consulted. Most recent weight and BMI monitored-     Measurements:  Wt Readings from Last 1 Encounters:   06/03/23 59.9 kg (132 lb)   Body mass index is 24.14 kg/m².    Patient has been screened and assessed by RD.    Malnutrition Type:  Context: social/environmental circumstances  Level: severe    Malnutrition Characteristic Summary:  Weight Loss (Malnutrition):  (23% x 4 months)  Energy  Intake (Malnutrition): less than or equal to 75% for greater than or equal to 1 month  Subcutaneous Fat (Malnutrition): severe depletion (suspecting)  Muscle Mass (Malnutrition): severe depletion  Fluid Accumulation (Malnutrition): other (see comments) (mild-moderate)    Interventions/Recommendations (treatment strategy):  1. Recommend goal of Impact Peptide 1.5 @ 40 ml/hr to provide 1440 kcal, 90 g pro, 739 ml water. Advance/adjust as appropriate. 2. Bolus TF rec: Impact Peptide 1.5 4 cans per day to provide 1500 kcal, 94 g pro, 772 mL water. 3. RD following.    GI  Acute liver failure without hepatic coma  GI/Hepatology following    Hepatic cirrhosis  Continue Lactulose and Rifaximin   Ammonia normalized   Follow coags and fibrinogen      Hepatic encephalopathy  Continue lactulose   See Non-convulsive status epilepticus    Other  Retroperitoneal bleed  6/11 --> acute hypotension, hypoxia --> hypovolemic shock --> Large R peritoneal hematoma --> IR for class A embo --> s/p IR embolization of left lumbar and internal iliac branch foci of arterial extravasation  Trend Hg, repeat CT c/a/p stable          The patient is being Prophylaxed for:  Venous Thromboembolism with: Mechanical  Stress Ulcer with: PPI  Ventilator Pneumonia with: chlorhexidine oral care    Activity Orders          Diet NPO: NPO starting at 06/15 0500    Elevate HOB flat until bedrest complete starting at 06/11 1641    Turn patient starting at 05/28 6773    Progressive Mobility Protocol (mobilize patient to their highest level of functioning at least twice daily) starting at 05/28 2000        Full Code    Cameron Yadav MD  Neurocritical Care  [facility] - Neuro Critical Care

## 2023-06-17 LAB
ALBUMIN SERPL BCP-MCNC: 2.7 G/DL (ref 3.5–5.2)
ALLENS TEST: ABNORMAL
ALP SERPL-CCNC: 108 U/L (ref 55–135)
ALT SERPL W/O P-5'-P-CCNC: 25 U/L (ref 10–44)
ANION GAP SERPL CALC-SCNC: 10 MMOL/L (ref 8–16)
ANISOCYTOSIS BLD QL SMEAR: SLIGHT
APTT PPP: 30.7 SEC (ref 21–32)
APTT PPP: 31.6 SEC (ref 21–32)
AST SERPL-CCNC: 50 U/L (ref 10–40)
BASOPHILS # BLD AUTO: 0 K/UL (ref 0–0.2)
BASOPHILS NFR BLD: 0 % (ref 0–1.9)
BILIRUB SERPL-MCNC: 5.8 MG/DL (ref 0.1–1)
BLD PROD TYP BPU: NORMAL
BLOOD UNIT EXPIRATION DATE: NORMAL
BLOOD UNIT TYPE CODE: 600
BLOOD UNIT TYPE: NORMAL
BUN SERPL-MCNC: 15 MG/DL (ref 6–20)
CALCIUM SERPL-MCNC: 8.5 MG/DL (ref 8.7–10.5)
CHLORIDE SERPL-SCNC: 115 MMOL/L (ref 95–110)
CO2 SERPL-SCNC: 27 MMOL/L (ref 23–29)
CODING SYSTEM: NORMAL
CREAT SERPL-MCNC: 0.4 MG/DL (ref 0.5–1.4)
CROSSMATCH INTERPRETATION: NORMAL
DELSYS: ABNORMAL
DIFFERENTIAL METHOD: ABNORMAL
DISPENSE STATUS: NORMAL
EOSINOPHIL # BLD AUTO: 0 K/UL (ref 0–0.5)
EOSINOPHIL NFR BLD: 0.6 % (ref 0–8)
EP: 5
ERYTHROCYTE [DISTWIDTH] IN BLOOD BY AUTOMATED COUNT: 21.3 % (ref 11.5–14.5)
ERYTHROCYTE [SEDIMENTATION RATE] IN BLOOD BY WESTERGREN METHOD: 14 MM/H
EST. GFR  (NO RACE VARIABLE): >60 ML/MIN/1.73 M^2
FIBRINOGEN PPP-MCNC: 156 MG/DL (ref 182–400)
FIBRINOGEN PPP-MCNC: 161 MG/DL (ref 182–400)
FIO2: 60
GGT SERPL-CCNC: 33 U/L (ref 8–55)
GLUCOSE SERPL-MCNC: 156 MG/DL (ref 70–110)
HCO3 UR-SCNC: 28.4 MMOL/L (ref 24–28)
HCT VFR BLD AUTO: 24 % (ref 37–48.5)
HGB BLD-MCNC: 7.4 G/DL (ref 12–16)
HYPOCHROMIA BLD QL SMEAR: ABNORMAL
IMM GRANULOCYTES # BLD AUTO: 0.03 K/UL (ref 0–0.04)
IMM GRANULOCYTES NFR BLD AUTO: 1.9 % (ref 0–0.5)
INR PPP: 1.8 (ref 0.8–1.2)
INR PPP: 1.9 (ref 0.8–1.2)
IP: 10
LYMPHOCYTES # BLD AUTO: 0.1 K/UL (ref 1–4.8)
LYMPHOCYTES NFR BLD: 8.2 % (ref 18–48)
MAGNESIUM SERPL-MCNC: 1.7 MG/DL (ref 1.6–2.6)
MCH RBC QN AUTO: 29.8 PG (ref 27–31)
MCHC RBC AUTO-ENTMCNC: 30.8 G/DL (ref 32–36)
MCV RBC AUTO: 97 FL (ref 82–98)
MIN VOL: 6.3
MODE: ABNORMAL
MONOCYTES # BLD AUTO: 0.1 K/UL (ref 0.3–1)
MONOCYTES NFR BLD: 8.8 % (ref 4–15)
NEUTROPHILS # BLD AUTO: 1.3 K/UL (ref 1.8–7.7)
NEUTROPHILS NFR BLD: 80.5 % (ref 38–73)
NRBC BLD-RTO: 0 /100 WBC
PCO2 BLDA: 41 MMHG (ref 35–45)
PH SMN: 7.45 [PH] (ref 7.35–7.45)
PHOSPHATE SERPL-MCNC: 2.2 MG/DL (ref 2.7–4.5)
PLATELET # BLD AUTO: 36 K/UL (ref 150–450)
PLATELET BLD QL SMEAR: ABNORMAL
PMV BLD AUTO: 9.7 FL (ref 9.2–12.9)
PO2 BLDA: 140 MMHG (ref 80–100)
POC BE: 4 MMOL/L
POC SATURATED O2: 99 % (ref 95–100)
POC TCO2: 30 MMOL/L (ref 23–27)
POCT GLUCOSE: 132 MG/DL (ref 70–110)
POCT GLUCOSE: 143 MG/DL (ref 70–110)
POCT GLUCOSE: 146 MG/DL (ref 70–110)
POCT GLUCOSE: 193 MG/DL (ref 70–110)
POLYCHROMASIA BLD QL SMEAR: ABNORMAL
POTASSIUM SERPL-SCNC: 3.1 MMOL/L (ref 3.5–5.1)
PROT SERPL-MCNC: 5 G/DL (ref 6–8.4)
PROTHROMBIN TIME: 18.4 SEC (ref 9–12.5)
PROTHROMBIN TIME: 19.9 SEC (ref 9–12.5)
RBC # BLD AUTO: 2.48 M/UL (ref 4–5.4)
SAMPLE: ABNORMAL
SITE: ABNORMAL
SODIUM SERPL-SCNC: 152 MMOL/L (ref 136–145)
SPONT RATE: 14
UNIT NUMBER: NORMAL
WBC # BLD AUTO: 1.59 K/UL (ref 3.9–12.7)

## 2023-06-17 PROCEDURE — 84100 ASSAY OF PHOSPHORUS: CPT

## 2023-06-17 PROCEDURE — 27000221 HC OXYGEN, UP TO 24 HOURS

## 2023-06-17 PROCEDURE — 85025 COMPLETE CBC W/AUTO DIFF WBC: CPT | Performed by: PSYCHIATRY & NEUROLOGY

## 2023-06-17 PROCEDURE — C9113 INJ PANTOPRAZOLE SODIUM, VIA: HCPCS | Performed by: STUDENT IN AN ORGANIZED HEALTH CARE EDUCATION/TRAINING PROGRAM

## 2023-06-17 PROCEDURE — 25000242 PHARM REV CODE 250 ALT 637 W/ HCPCS

## 2023-06-17 PROCEDURE — 94761 N-INVAS EAR/PLS OXIMETRY MLT: CPT

## 2023-06-17 PROCEDURE — 99291 CRITICAL CARE FIRST HOUR: CPT | Mod: GC,,, | Performed by: PSYCHIATRY & NEUROLOGY

## 2023-06-17 PROCEDURE — 86920 COMPATIBILITY TEST SPIN: CPT | Performed by: STUDENT IN AN ORGANIZED HEALTH CARE EDUCATION/TRAINING PROGRAM

## 2023-06-17 PROCEDURE — 25000003 PHARM REV CODE 250: Performed by: STUDENT IN AN ORGANIZED HEALTH CARE EDUCATION/TRAINING PROGRAM

## 2023-06-17 PROCEDURE — 94660 CPAP INITIATION&MGMT: CPT

## 2023-06-17 PROCEDURE — 25000003 PHARM REV CODE 250

## 2023-06-17 PROCEDURE — 85610 PROTHROMBIN TIME: CPT | Mod: 91 | Performed by: STUDENT IN AN ORGANIZED HEALTH CARE EDUCATION/TRAINING PROGRAM

## 2023-06-17 PROCEDURE — 63600175 PHARM REV CODE 636 W HCPCS

## 2023-06-17 PROCEDURE — 37799 UNLISTED PX VASCULAR SURGERY: CPT

## 2023-06-17 PROCEDURE — P9035 PLATELET PHERES LEUKOREDUCED: HCPCS

## 2023-06-17 PROCEDURE — 85730 THROMBOPLASTIN TIME PARTIAL: CPT | Performed by: PSYCHIATRY & NEUROLOGY

## 2023-06-17 PROCEDURE — 94640 AIRWAY INHALATION TREATMENT: CPT

## 2023-06-17 PROCEDURE — P9047 ALBUMIN (HUMAN), 25%, 50ML: HCPCS | Mod: JZ,JG

## 2023-06-17 PROCEDURE — 83735 ASSAY OF MAGNESIUM: CPT

## 2023-06-17 PROCEDURE — 86900 BLOOD TYPING SEROLOGIC ABO: CPT | Performed by: PSYCHIATRY & NEUROLOGY

## 2023-06-17 PROCEDURE — 63600175 PHARM REV CODE 636 W HCPCS: Performed by: PSYCHIATRY & NEUROLOGY

## 2023-06-17 PROCEDURE — 85384 FIBRINOGEN ACTIVITY: CPT | Performed by: STUDENT IN AN ORGANIZED HEALTH CARE EDUCATION/TRAINING PROGRAM

## 2023-06-17 PROCEDURE — 99900035 HC TECH TIME PER 15 MIN (STAT)

## 2023-06-17 PROCEDURE — 80076 HEPATIC FUNCTION PANEL: CPT | Performed by: NURSE PRACTITIONER

## 2023-06-17 PROCEDURE — 80053 COMPREHEN METABOLIC PANEL: CPT

## 2023-06-17 PROCEDURE — 99291 PR CRITICAL CARE, E/M 30-74 MINUTES: ICD-10-PCS | Mod: GC,,, | Performed by: PSYCHIATRY & NEUROLOGY

## 2023-06-17 PROCEDURE — 63600175 PHARM REV CODE 636 W HCPCS: Performed by: STUDENT IN AN ORGANIZED HEALTH CARE EDUCATION/TRAINING PROGRAM

## 2023-06-17 PROCEDURE — 20000000 HC ICU ROOM

## 2023-06-17 PROCEDURE — 82977 ASSAY OF GGT: CPT

## 2023-06-17 PROCEDURE — 86920 COMPATIBILITY TEST SPIN: CPT

## 2023-06-17 PROCEDURE — 82803 BLOOD GASES ANY COMBINATION: CPT

## 2023-06-17 PROCEDURE — 51798 US URINE CAPACITY MEASURE: CPT

## 2023-06-17 PROCEDURE — 25000003 PHARM REV CODE 250: Performed by: PSYCHIATRY & NEUROLOGY

## 2023-06-17 RX ORDER — FUROSEMIDE 10 MG/ML
20 INJECTION INTRAMUSCULAR; INTRAVENOUS ONCE
Status: DISCONTINUED | OUTPATIENT
Start: 2023-06-17 | End: 2023-06-17

## 2023-06-17 RX ORDER — HYDROCODONE BITARTRATE AND ACETAMINOPHEN 500; 5 MG/1; MG/1
TABLET ORAL
Status: DISCONTINUED | OUTPATIENT
Start: 2023-06-17 | End: 2023-06-19

## 2023-06-17 RX ORDER — QUETIAPINE FUMARATE 25 MG/1
25 TABLET, FILM COATED ORAL ONCE
Status: COMPLETED | OUTPATIENT
Start: 2023-06-18 | End: 2023-06-17

## 2023-06-17 RX ORDER — ALBUMIN HUMAN 250 G/1000ML
12.5 SOLUTION INTRAVENOUS ONCE
Status: COMPLETED | OUTPATIENT
Start: 2023-06-17 | End: 2023-06-17

## 2023-06-17 RX ORDER — FUROSEMIDE 10 MG/ML
20 INJECTION INTRAMUSCULAR; INTRAVENOUS ONCE
Status: COMPLETED | OUTPATIENT
Start: 2023-06-17 | End: 2023-06-17

## 2023-06-17 RX ORDER — SILODOSIN 4 MG/1
4 CAPSULE ORAL DAILY
Status: DISCONTINUED | OUTPATIENT
Start: 2023-06-17 | End: 2023-06-19

## 2023-06-17 RX ADMIN — PANTOPRAZOLE SODIUM 40 MG: 40 INJECTION, POWDER, FOR SOLUTION INTRAVENOUS at 08:06

## 2023-06-17 RX ADMIN — SILODOSIN 4 MG: 4 CAPSULE ORAL at 11:06

## 2023-06-17 RX ADMIN — IPRATROPIUM BROMIDE AND ALBUTEROL SULFATE 3 ML: 2.5; .5 SOLUTION RESPIRATORY (INHALATION) at 12:06

## 2023-06-17 RX ADMIN — Medication 800 MG: at 11:06

## 2023-06-17 RX ADMIN — POTASSIUM BICARBONATE 35 MEQ: 391 TABLET, EFFERVESCENT ORAL at 08:06

## 2023-06-17 RX ADMIN — FUROSEMIDE 20 MG: 10 INJECTION, SOLUTION INTRAMUSCULAR; INTRAVENOUS at 12:06

## 2023-06-17 RX ADMIN — LEVETIRACETAM 500 MG: 500 SOLUTION ORAL at 08:06

## 2023-06-17 RX ADMIN — IPRATROPIUM BROMIDE AND ALBUTEROL SULFATE 3 ML: 2.5; .5 SOLUTION RESPIRATORY (INHALATION) at 04:06

## 2023-06-17 RX ADMIN — ACETYLCYSTEINE 4 ML: 100 INHALANT RESPIRATORY (INHALATION) at 08:06

## 2023-06-17 RX ADMIN — VANCOMYCIN HYDROCHLORIDE 1000 MG: 1 INJECTION, POWDER, LYOPHILIZED, FOR SOLUTION INTRAVENOUS at 11:06

## 2023-06-17 RX ADMIN — RIFAXIMIN 550 MG: 550 TABLET ORAL at 08:06

## 2023-06-17 RX ADMIN — PIPERACILLIN SODIUM AND TAZOBACTAM SODIUM 4.5 G: 4; .5 INJECTION, POWDER, LYOPHILIZED, FOR SOLUTION INTRAVENOUS at 10:06

## 2023-06-17 RX ADMIN — POTASSIUM & SODIUM PHOSPHATES POWDER PACK 280-160-250 MG 2 PACKET: 280-160-250 PACK at 04:06

## 2023-06-17 RX ADMIN — ACETYLCYSTEINE 4 ML: 100 INHALANT RESPIRATORY (INHALATION) at 12:06

## 2023-06-17 RX ADMIN — POTASSIUM & SODIUM PHOSPHATES POWDER PACK 280-160-250 MG 2 PACKET: 280-160-250 PACK at 11:06

## 2023-06-17 RX ADMIN — Medication 100 MG: at 08:06

## 2023-06-17 RX ADMIN — INSULIN ASPART 1 UNITS: 100 INJECTION, SOLUTION INTRAVENOUS; SUBCUTANEOUS at 01:06

## 2023-06-17 RX ADMIN — OLANZAPINE 5 MG: 2.5 TABLET, FILM COATED ORAL at 08:06

## 2023-06-17 RX ADMIN — PIPERACILLIN SODIUM AND TAZOBACTAM SODIUM 4.5 G: 4; .5 INJECTION, POWDER, LYOPHILIZED, FOR SOLUTION INTRAVENOUS at 06:06

## 2023-06-17 RX ADMIN — ALBUMIN HUMAN 12.5 G: 0.25 SOLUTION INTRAVENOUS at 10:06

## 2023-06-17 RX ADMIN — FOLIC ACID 1 MG: 1 TABLET ORAL at 08:06

## 2023-06-17 RX ADMIN — FUROSEMIDE 20 MG: 10 INJECTION, SOLUTION INTRAMUSCULAR; INTRAVENOUS at 08:06

## 2023-06-17 RX ADMIN — IPRATROPIUM BROMIDE AND ALBUTEROL SULFATE 3 ML: 2.5; .5 SOLUTION RESPIRATORY (INHALATION) at 08:06

## 2023-06-17 RX ADMIN — OLANZAPINE 5 MG: 2.5 TABLET, FILM COATED ORAL at 09:06

## 2023-06-17 RX ADMIN — PIPERACILLIN SODIUM AND TAZOBACTAM SODIUM 4.5 G: 4; .5 INJECTION, POWDER, LYOPHILIZED, FOR SOLUTION INTRAVENOUS at 01:06

## 2023-06-17 RX ADMIN — QUETIAPINE FUMARATE 25 MG: 25 TABLET ORAL at 11:06

## 2023-06-17 RX ADMIN — Medication 800 MG: at 08:06

## 2023-06-17 RX ADMIN — MELATONIN TAB 3 MG 6 MG: 3 TAB at 08:06

## 2023-06-17 RX ADMIN — POTASSIUM & SODIUM PHOSPHATES POWDER PACK 280-160-250 MG 2 PACKET: 280-160-250 PACK at 08:06

## 2023-06-17 RX ADMIN — POTASSIUM BICARBONATE 35 MEQ: 391 TABLET, EFFERVESCENT ORAL at 11:06

## 2023-06-17 RX ADMIN — ARIPIPRAZOLE 5 MG: 5 TABLET ORAL at 08:06

## 2023-06-17 NOTE — ASSESSMENT & PLAN NOTE
Continue Lactulose and Rifaximin   Ammonia normalized   Follow coags and fibrinogen      - consider re-consult hepatology

## 2023-06-17 NOTE — PLAN OF CARE
Problem: Adult Inpatient Plan of Care  Goal: Plan of Care Review  Outcome: Ongoing, Progressing     Problem: Skin Injury Risk Increased  Goal: Skin Health and Integrity  Outcome: Ongoing, Progressing     Problem: Fall Injury Risk  Goal: Absence of Fall and Fall-Related Injury  Outcome: Ongoing, Progressing     [facility] Care Plan    POC reviewed with Marely Hamilton and family at 1500. Pt nodded in understanding. Questions and concerns addressed. No acute events today. Pt progressing toward goals. Will continue to monitor. See below and flowsheets for full assessment and VS info.     Goal for platelets changed to 25 or greater or active bleeding.  Impact peptide restarted with free water of 150 q4.  Trialysis line removed.  Bipap weaned to 40#  Lasix albumin given.  Straight cathed patient due to urinary retention.         Is this a stroke patient? no    Neuro:  Cheryl Coma Scale  Best Eye Response: 4-->(E4) spontaneous  Best Motor Response: 6-->(M6) obeys commands  Best Verbal Response: 2-->(V2) incomprehensible speech  Cheryl Coma Scale Score: 12  Assessment Qualifiers: patient not sedated/intubated, no eye obstruction present  Pupil PERRLA: no     24 hr Temp:  [97 °F (36.1 °C)-99.3 °F (37.4 °C)]     CV:   Rhythm: sinus tachycardia  BP goals:   SBP < 180  MAP > 65    Resp:      Vent Mode: Spont  Set Rate: 12 BPM  Oxygen Concentration (%): 30  Vt Set: 340 mL  PEEP/CPAP: 5 cmH20  Pressure Support: 10 cmH20    Plan: N/A    GI/:     Diet/Nutrition Received: tube feeding  Last Bowel Movement: 06/17/23  Voiding Characteristics: external catheter    Intake/Output Summary (Last 24 hours) at 6/17/2023 1654  Last data filed at 6/17/2023 1600  Gross per 24 hour   Intake 1385.57 ml   Output 1550 ml   Net -164.43 ml     Unmeasured Output  Urine Occurrence: 1  Stool Occurrence: 1  Emesis Occurrence: 0  Pad Count: 1    Labs/Accuchecks:  Recent Labs   Lab 06/17/23  0254   WBC 1.59*   RBC 2.48*   HGB 7.4*   HCT 24.0*   PLT 36*       Recent Labs   Lab 06/17/23  0254   *   K 3.1*   CO2 27   *   BUN 15   CREATININE 0.4*   ALKPHOS 108   ALT 25   AST 50*   BILITOT 5.8*      Recent Labs   Lab 06/17/23  0856   INR 1.8*   APTT 30.7      Recent Labs   Lab 06/13/23  1533   TROPONINI 0.029*       Electrolytes: Electrolytes replaced  Accuchecks: Q6H    Gtts:      LDA/Wounds:  Lines/Drains/Airways       Drain  Duration                  NG/OG Tube 06/01/23 2200 Right nostril 15 days         Rectal Tube 06/03/23 1300 14 days    Female External Urinary Catheter 06/16/23 1505 1 day              Arterial Line  Duration             Arterial Line 06/11/23 1300 Left Femoral 6 days              Peripheral Intravenous Line  Duration                  Midline Catheter Insertion/Assessment  - Single Lumen 05/25/23 1005 Right basilic vein (medial side of arm) other (see comments) 23 days         Peripheral IV - Single Lumen 06/15/23 1229 20 G;1 3/4 in Right Lower leg 2 days                  Wounds: Yes  Wound care consulted: Yes

## 2023-06-17 NOTE — SUBJECTIVE & OBJECTIVE
Interval History:  plts transfusion x2. No active bleeding. Tolerated bipap well.     Review of Systems   Unable to perform ROS: Other on bipap     Objective:     Vitals:  Temp: 97.6 °F (36.4 °C)  Pulse: 104  Rhythm: sinus tachycardia  BP: 136/60  MAP (mmHg): 86  Resp: 14  SpO2: 98 %  Oxygen Concentration (%): 40    Temp  Min: 97 °F (36.1 °C)  Max: 100.8 °F (38.2 °C)  Pulse  Min: 76  Max: 153  BP  Min: 127/61  Max: 172/77  MAP (mmHg)  Min: 86  Max: 112  Resp  Min: 14  Max: 33  SpO2  Min: 94 %  Max: 100 %  Oxygen Concentration (%)  Min: 40  Max: 60    06/16 0701 - 06/17 0700  In: 1553.9 [I.V.:89.9]  Out: 2460 [Urine:2135]   Unmeasured Output  Urine Occurrence: 1  Stool Occurrence: 1  Emesis Occurrence: 0  Pad Count: 1        Physical Exam  Vitals and nursing note reviewed.   Constitutional:       General: She is not in acute distress.     Appearance: She is ill-appearing.      Comments: Opens eye spontaneously, following commands   HENT:      Head: Normocephalic and atraumatic.   Eyes:      General: Scleral icterus present.      Extraocular Movements: Extraocular movements intact.      Pupils: Pupils are equal, round, and reactive to light.   Cardiovascular:      Rate and Rhythm: Normal rate.   Pulmonary:      Comments:   Noninvasive mechanically ventilated  Abdominal:      Palpations: Abdomen is distended but soft  Musculoskeletal:      Cervical back: Neck supple.   Skin:     General: Skin is warm.   Neurological:      Mental Status: She is alert.      Comments:   Alert  On BiPap unable to test orientation but nods appropriately and mouths words  Follows commands x4  Pupils equal, reactive  Face grossly symmetric   Moves all extremities antigravity and to commands       Medications:  Continuous Scheduledacetylcysteine 100 mg/ml (10%), 4 mL, Q6H  albuterol-ipratropium, 3 mL, Q4H  ARIPiprazole, 5 mg, Daily  folic acid, 1 mg, Daily  levetiracetam, 500 mg, BID  melatonin, 6 mg, Nightly  OLANZapine, 5 mg,  BID  pantoprazole, 40 mg, BID  piperacillin-tazobactam (Zosyn) IV (PEDS and ADULTS) (extended infusion is not appropriate), 4.5 g, Q8H  rifAXIMin, 550 mg, BID  thiamine, 100 mg, Daily  vancomycin (VANCOCIN) IVPB, 15 mg/kg, Q12H    PRNsodium chloride, , Q24H PRN  sodium chloride, , Q24H PRN  sodium chloride, , Q24H PRN  sodium chloride, , Q24H PRN  aluminum-magnesium hydroxide-simethicone, 30 mL, QID PRN  dextrose 10%, 12.5 g, PRN  dextrose 10%, 25 g, PRN  dextrose, 15 g, PRN  dextrose, 30 g, PRN  glucagon (human recombinant), 1 mg, PRN  insulin aspart U-100, 1-10 Units, Q6H PRN  labetalol, 10 mg, Q4H PRN  magnesium oxide, 800 mg, PRN  magnesium oxide, 800 mg, PRN  naloxone, 0.02 mg, PRN  ondansetron, 4 mg, Q8H PRN  potassium bicarbonate, 35 mEq, PRN  potassium bicarbonate, 50 mEq, PRN  potassium bicarbonate, 60 mEq, PRN  potassium, sodium phosphates, 2 packet, PRN  potassium, sodium phosphates, 2 packet, PRN  potassium, sodium phosphates, 2 packet, PRN  racepinephrine, 0.5 mL, Q4H PRN  simethicone, 1 tablet, QID PRN  sodium chloride 0.9%, 10 mL, Q8H PRN  vancomycin - pharmacy to dose, , pharmacy to manage frequency      Today I personally reviewed pertinent medications, lines/drains/airways, imaging, laboratory results,    Diet  Diet NPO  Diet NPO

## 2023-06-17 NOTE — ASSESSMENT & PLAN NOTE
58F with EtOH induced hepatic cirrhosis, seizure disorder on outpatient LEV and ZNS and bipolar disorder admitted on 5/28 for persistent encephalopathy.    - UTI on admit (Proteus mirabilis), now s/p CTX course  - Hypercalcemia 2/2 lithium toxicity (Lithium changed to Abilify per Psych)  - Hepatic encephalopathy 2/2 to medication non compliance, treated with lactulose and rifaximin     MRI Brain WO was unremarkable for acute neurologic change  EEG w/ frequent left hemispheric electrographic seizures and NCSE  Repeat EEGs without seizure activity x 24 hours, now resolved    6/11 --> acute hypotension, hypoxia --> hypovolemic shock --> Large R peritoneal hematoma --> IR for class A embo --> s/p IR embolization of left lumbar and internal iliac branch foci of arterial extravasation  6/13 --> episode of hypoxia and SOB --> CTA PE without evidence of PE, CT C/A/P without new bleed    - Continued admission to [facility]  - Q1h neurochecks while in ICU  - Q1h vitals while in ICU  - HOB@30  - Keppra 500mg BID  - Thiamine replacement   - Lactulose, titrate to BM  - MAP>65  - Daily CMP, Mag, Phos - replete electrolytes PRN  - Lipid panel, A1c, Coags reviewed  - Q8 CBC, transfuse PRN and replace Plts <50  - BID fibrinogen/PT-INR  - Heparin held in setting of acute bleed, IVC filter in place  - PT/OT/SLP as appropriate  - CM/SW consult for dispo planning

## 2023-06-17 NOTE — PROGRESS NOTES
Pharmacokinetic Initial Assessment: IV Vancomycin    Assessment/Plan:    Initiate intravenous vancomycin with loading dose of 1500 mg once followed by a maintenance dose of vancomycin 1000 mg IV every 12 hours  Desired empiric serum trough concentration is 15 to 20 mcg/mL  Draw vancomycin trough level 60 min prior to fourth dose on 6/18 at approximately 1030  Pharmacy will continue to follow and monitor vancomycin.      Please contact pharmacy at extension [phone] with any questions regarding this assessment.     Thank you for the consult,   Catalina Ralph       Patient brief summary:  Marely Hamilton is a 58 y.o. female initiated on antimicrobial therapy with IV Vancomycin for treatment of suspected lower respiratory infection    Drug Allergies:   Review of patient's allergies indicates:   Allergen Reactions    Sulfa (sulfonamide antibiotics) Rash    Codeine Nausea And Vomiting       Actual Body Weight:   59.9 kg    Renal Function:   Estimated Creatinine Clearance: 122.7 mL/min (A) (based on SCr of 0.4 mg/dL (L)).    Dialysis Method (if applicable):  N/A    CBC (last 72 hours):  Recent Labs   Lab Result Units 06/14/23  0255 06/14/23  0553 06/15/23  0333 06/16/23  0231 06/16/23  2054   WBC K/uL 4.07 4.36 4.65 2.27* 1.80*   Hemoglobin g/dL 7.1* 7.4* 7.7* 7.3* 7.4*   Hematocrit % 21.8* 22.4* 24.0* 23.6* 23.9*   Platelets K/uL 40* 44* 56* 37* 34*   Gran % % 71.1 69.4 73.3* 76.7* 84.0*   Lymph % % 15.0* 16.5* 15.1* 11.5* 10.0*   Mono % % 11.3 11.0 9.7 8.8 4.0   Eosinophil % % 1.7 2.1 1.5 2.2 2.0   Basophil % % 0.2 0.5 0.2 0.4 0.0   Differential Method  Automated Automated Automated Automated Manual       Metabolic Panel (last 72 hours):  Recent Labs   Lab Result Units 06/14/23  0255 06/14/23  2046 06/15/23  0333 06/15/23  1303 06/15/23  2025 06/16/23  0231 06/16/23  2312   Sodium mmol/L 144  --  146* 147*  --  149*  --    Potassium mmol/L 3.9  --  3.5 4.1  --  3.9  --    Chloride mmol/L 114*  --  112* 115*  --  117*  --     CO2 mmol/L 25  --  26 25  --  27  --    Glucose mg/dL 156*  --  127* 122*  --  174*  --    BUN mg/dL 24*  --  17 15  --  15  --    Creatinine mg/dL 0.4*  --  0.4* 0.4*  --  0.4*  --    Albumin g/dL 2.4* 2.7* 2.6*  --  2.5* 2.3* 2.8*   Total Bilirubin mg/dL 3.4* 5.0* 4.5*  --  4.5* 4.2* 5.4*   Alkaline Phosphatase U/L 116 113 116  --  126 127 109   AST U/L 31 34 31  --  30 28 42*   ALT U/L 24 23 21  --  21 19 22   Magnesium mg/dL 1.7  --  1.8 1.8  --  1.9  --    Phosphorus mg/dL 2.3*  --  2.5* 2.9  --  2.3*  --        Drug levels (last 3 results):  No results for input(s): VANCOMYCINRA, VANCORANDOM, VANCOMYCINPE, VANCOPEAK, VANCOMYCINTR, VANCOTROUGH in the last 72 hours.    Microbiologic Results:  Microbiology Results (last 7 days)       ** No results found for the last 168 hours. **

## 2023-06-17 NOTE — PLAN OF CARE
[facility] Care Plan    POC reviewed with Marely Hamilton and family at 0300. Pt able to nod in understanding but unable to verbalize questions or concerns.See below and flowsheets for full assessment and VS info.   -2 units of platelets given overnight for critical platelet count of 34-36  -Abx started due to critical white count of 1.59-1.8  -Tolerated bipap and maintained sats >90; pt tolerated short periods of laying flat  -cough remains weak  -no fever overnight  -R UA midline leaking  -L fem a-line appears to be slightly pulled out however, waveform is still appropriate  -NPO   -lasix given  -pt voided 200ml independently; required straight cath for retention          Is this a stroke patient? no    Neuro:  [place] Coma Scale  Best Eye Response: 4-->(E4) spontaneous  Best Motor Response: 6-->(M6) obeys commands  Best Verbal Response: 2-->(V2) incomprehensible speech  [place] Coma Scale Score: 12  Assessment Qualifiers: patient not sedated/intubated  Pupil PERRLA: no     24hr Temp:  [97 °F (36.1 °C)-102.2 °F (39 °C)]     CV:   Rhythm: sinus tachycardia, normal sinus rhythm  BP goals:   SBP < 180  MAP > 65    Resp:      Vent Mode: Spont  Set Rate: 12 BPM  Oxygen Concentration (%): 60  Vt Set: 340 mL  PEEP/CPAP: 5 cmH20  Pressure Support: 10 cmH20    Plan: wean bipap    GI/:     Diet/Nutrition Received: NPO  Last Bowel Movement: 06/17/23  Voiding Characteristics: urethral catheter (bladder)    Intake/Output Summary (Last 24 hours) at 6/17/2023 0642  Last data filed at 6/17/2023 0301  Gross per 24 hour   Intake 1193.86 ml   Output 2460 ml   Net -1266.14 ml     Unmeasured Output  Urine Occurrence: 1  Stool Occurrence: 1  Emesis Occurrence: 0  Pad Count: 1    Labs/Accuchecks:  Recent Labs   Lab 06/17/23  0254   WBC 1.59*   RBC 2.48*   HGB 7.4*   HCT 24.0*   PLT 36*      Recent Labs   Lab 06/17/23  0254   *   K 3.1*   CO2 27   *   BUN 15   CREATININE 0.4*   ALKPHOS 108   ALT 25   AST 50*   BILITOT 5.8*       Recent Labs   Lab 06/16/23  1729 06/16/23  2054   INR 1.8*  --    APTT  --  31.1      Recent Labs   Lab 06/13/23  1533   TROPONINI 0.029*       Electrolytes: Electrolytes replaced  Accuchecks: Q6H    Gtts:      LDA/Wounds:  Lines/Drains/Airways       Central Venous Catheter Line  Duration             Trialysis (Dialysis) Catheter 06/11/23 1300 right internal jugular 5 days              Drain  Duration                  NG/OG Tube 06/01/23 2200 Right nostril 15 days         Rectal Tube 06/03/23 1300 13 days    Female External Urinary Catheter 06/16/23 1505 <1 day              Arterial Line  Duration             Arterial Line 06/11/23 1300 Left Femoral 5 days              Peripheral Intravenous Line  Duration                  Midline Catheter Insertion/Assessment  - Single Lumen 05/25/23 1005 Right basilic vein (medial side of arm) other (see comments) 22 days         Peripheral IV - Single Lumen 06/15/23 1229 20 G;1 3/4 in Right Lower leg 1 day                  Wounds: Yes  Wound care consulted: Yes

## 2023-06-17 NOTE — PROGRESS NOTES
[facility]  Neurocritical Care  Progress Note    Admit Date: 5/28/2023  Service Date: 06/17/2023  Length of Stay: 20    Subjective:     Chief Complaint: Non-convulsive status epilepticus    History of Present Illness: Marely Hamilton is a 58 year old female with a medical history significant for EtOH induced hepatic cirrhosis, seizure disorder on outpatient LEV and ZNS and bipolar disorder who was admitted to [facility] on 5/28 as a transfer from OSH for evaluation of persistent encephalopathy concerning for NCSE vs hepatic encephalopathy. History is obtained from chart as patient is unresponsive and unable to assist. Initially presented to [facility] on 5/18 for encephalopathy and thought to be multifactorial including UTI (Proteus mirabilis s/p CTX course), hypercalcemia 2/2 lithium toxicity (Lithium changed to Abilify per Psych), as well as hepatic encephalopathy secondary to medication non compliance. She was treated with lactulose and rifaximin with no improvement in her mental status. EEG showed generalized slowing as well as triphasic discharges in the left hemisphere. MRI Brain WO was unremarkable for acute neurologic change. Decision was made to transfer patient to [facility] for higher level of care and ongoing evaluation and management. On arrival, mental status exam has waxed and waned with patient being intermittently verbal and following commands. NH4 48.    NCC is consulted on hospital day 4 for admission after EEG showed frequent left hemispheric electrographic seizures lasting on average 10-30 seconds with prolonged seizures over 1.5 hours also noted. Per chart review, patient home dose of LEV increased from 1000mg to 1500mg BID, ZNS increased to 200mg. Vimpat 150mg BID added per General Neurology (had not been given prior to consult). On initial evaluation, patient is not responsive to voice or pain. Upward gaze noted. Decision made to transfer patient to [facility] for higher level of care and  airway watch.       Hospital Course: 06/02/2023 Tachycardic and hypotensive, transferred for development of mostly right hemispheric status, LOC exam remains poor  Intubated for airway protection, EEG has changed to mostly include triphasics with no definite seizure activity per Dr Boss, propofol stopped and AEDs simplified to keppra 1000mg bid only, wean off pressor, give colloids with crystalloid resuscitation  06/03/2023: remains on persistant significant pressor support despite adequate hydration, problems with third spacing and may have pulmonary hypertension as well, consider trial of stress steroids if hgb stabilizes  06/04/2023: levo down to 0.08mcg, start and advance TFs, vaso at 0.04U, ammonia has been stable, slight rise in tbili, check direct bili, hgb and plts improved, no clear source of bleeding, no source of bleeding on CT abdomen, consider superimposed hemolysis  06/05/2023 NAEON. Neurologic exam continues to improve, following commands in all extremities. Levo 0.02, weaning as able. Vaso discontinued, MAP>65. Daily SBT, monitor and hold TF at midnight for possible extubation tomorrow. Hemolysis work up ongoing, trending CBC. Continue CTX for SBP prophylaxis.   06/06/2023 Awake and following commands. SBT with low tidal volumes. Remains intubated for airway protection at this time with possible extubation tomorrow with continued neurologic improvement. Levo discontinued, maintaining SBP<65. Concern for hemolytic anemia related to autoimmune process vs hepatic dysfunction (elevated reticulocyte count and decreased haptoglobin).  06/07/2023 Rested on AC overnight due to low TV and fatigue. SBT with pressure support 10/5 this am, weaning ventilator as tolerated. Continue tube feeds with goal to optimize nutrition. Ammonia elevated, continue lactulose to encourage bowel movement.   06/08/2023 Failed SBT due to apnea. Some breaths on SIMV. Awake and following commands. Ammonia trending down (44) with  BMs, continue lactulose. Advance nutritional support with goal to increase strength towards extubation. Plt transfusions PRN. Cryo for fibrinogen <100.  06/09/2023 Venous US with R brachial and femoral DVT. Dicussed with Hematology with recommendation to start Argatroban with plts>50. Daily SBT with apnea, maintain on SIMV with backup rate of 10. Hyperammonemia resolved.   06/10/2023 patient is more alert and awake, tolerated SBT. Hematology agreed to switch to heparin for DVT given negative for HIT  06/11/2023   On AM rounds, patient was noted to be hypoxic and tachycardic. Hypoxia resolved with ventilator changes but patient continued to appear uncomfortable. Patient lied flat with noted improvement in oxygenation. Patient became acutely hypotensive, tachycardiac and hypoxic not responsive to ventilator changes. IVF bolus + Burton bump x3 + initiation of vasopressin due to concern for hypovolemic shock. Levophed added due to persistent hypertension. L femoral arterial line and R IJ trialysis placed emergently. STAT Hemoglobin 4.4. Received protamine for heparin reversal, 3u Plts and 3 FFP and 2 pRBC. STAT CTA abdo/pelvis with L retroperitoneal hematoma with + spot sign. IR consulted and patient taken class A for diagnostic angiogram with possible emobolization. Pressors weaned after volume resuscitation. Family updated over phone.   06/12/2023 s/p IR embolization of left lumbar and internal iliac branch foci of arterial extravasation. Hemoglobin stable, continue to trend CBC q8 with transfusion of pRBC for Hg<7. Restart trickle feeds. Albumin and lasix for dieresis. Low threshold for antibiotics given risk of SBP and RP hematoma. Unlikely to tolerate AC, will discuss placement of IVC filter with IR.   06/13/2023 Attempted to wean FiO2 overnight with noted drop of pO2. FiO2 increased to 55% and patient received albumin and was diuresed. Weaning this am with ABGs. Remains on fentanyl 12.5, neurologic exam unchanged. Hg  stable. Post AM rounds, patient with acute increase in O2 needs, STAT CTA chest/abd/pelvis to assess for PE vs further hemorrhage stable and without new findings. Continue current management.   06/14/2023 NAEON. Hg stable at 7.6. Plt 44, transfuse for <50. Pending IVC filter per IR for DVT burden with contraindications to AC.  06/15/2023 s/p IVC filter placement. Daily SBT, complicated by anxiety. Remains on spontaneous mode, 10/5, 40% FiO2 with goal to wean as able.   06/16/2023 Rested on AC overnight. Plts low, received 1 pack overnight. Daily SBT with plan to extubate as able.   06/17/2023 extubated on BiPap yesterday, weaning off FiO2. Plts trended down again, received transfusion overnight, no sign of active bleeding.       Interval History:  plts transfusion x2. No active bleeding. Tolerated bipap well.     Review of Systems   Unable to perform ROS: Other on bipap     Objective:     Vitals:  Temp: 97.6 °F (36.4 °C)  Pulse: 104  Rhythm: sinus tachycardia  BP: 136/60  MAP (mmHg): 86  Resp: 14  SpO2: 98 %  Oxygen Concentration (%): 40    Temp  Min: 97 °F (36.1 °C)  Max: 100.8 °F (38.2 °C)  Pulse  Min: 76  Max: 153  BP  Min: 127/61  Max: 172/77  MAP (mmHg)  Min: 86  Max: 112  Resp  Min: 14  Max: 33  SpO2  Min: 94 %  Max: 100 %  Oxygen Concentration (%)  Min: 40  Max: 60    06/16 0701 - 06/17 0700  In: 1553.9 [I.V.:89.9]  Out: 2460 [Urine:2135]   Unmeasured Output  Urine Occurrence: 1  Stool Occurrence: 1  Emesis Occurrence: 0  Pad Count: 1        Physical Exam  Vitals and nursing note reviewed.   Constitutional:       General: She is not in acute distress.     Appearance: She is ill-appearing.      Comments: Opens eye spontaneously, following commands   HENT:      Head: Normocephalic and atraumatic.   Eyes:      General: Scleral icterus present.      Extraocular Movements: Extraocular movements intact.      Pupils: Pupils are equal, round, and reactive to light.   Cardiovascular:      Rate and Rhythm: Normal rate.    Pulmonary:      Comments:   Noninvasive mechanically ventilated  Abdominal:      Palpations: Abdomen is distended but soft  Musculoskeletal:      Cervical back: Neck supple.   Skin:     General: Skin is warm.   Neurological:      Mental Status: She is alert.      Comments:   Alert  On BiPap unable to test orientation but nods appropriately and mouths words  Follows commands x4  Pupils equal, reactive  Face grossly symmetric   Moves all extremities antigravity and to commands       Medications:  Continuous Scheduledacetylcysteine 100 mg/ml (10%), 4 mL, Q6H  albuterol-ipratropium, 3 mL, Q4H  ARIPiprazole, 5 mg, Daily  folic acid, 1 mg, Daily  levetiracetam, 500 mg, BID  melatonin, 6 mg, Nightly  OLANZapine, 5 mg, BID  pantoprazole, 40 mg, BID  piperacillin-tazobactam (Zosyn) IV (PEDS and ADULTS) (extended infusion is not appropriate), 4.5 g, Q8H  rifAXIMin, 550 mg, BID  thiamine, 100 mg, Daily  vancomycin (VANCOCIN) IVPB, 15 mg/kg, Q12H    PRNsodium chloride, , Q24H PRN  sodium chloride, , Q24H PRN  sodium chloride, , Q24H PRN  sodium chloride, , Q24H PRN  aluminum-magnesium hydroxide-simethicone, 30 mL, QID PRN  dextrose 10%, 12.5 g, PRN  dextrose 10%, 25 g, PRN  dextrose, 15 g, PRN  dextrose, 30 g, PRN  glucagon (human recombinant), 1 mg, PRN  insulin aspart U-100, 1-10 Units, Q6H PRN  labetalol, 10 mg, Q4H PRN  magnesium oxide, 800 mg, PRN  magnesium oxide, 800 mg, PRN  naloxone, 0.02 mg, PRN  ondansetron, 4 mg, Q8H PRN  potassium bicarbonate, 35 mEq, PRN  potassium bicarbonate, 50 mEq, PRN  potassium bicarbonate, 60 mEq, PRN  potassium, sodium phosphates, 2 packet, PRN  potassium, sodium phosphates, 2 packet, PRN  potassium, sodium phosphates, 2 packet, PRN  racepinephrine, 0.5 mL, Q4H PRN  simethicone, 1 tablet, QID PRN  sodium chloride 0.9%, 10 mL, Q8H PRN  vancomycin - pharmacy to dose, , pharmacy to manage frequency      Today I personally reviewed pertinent medications, lines/drains/airways, imaging,  laboratory results,    Diet  Diet NPO  Diet NPO          Assessment/Plan:     Neuro  * Non-convulsive status epilepticus  58F with EtOH induced hepatic cirrhosis, seizure disorder on outpatient LEV and ZNS and bipolar disorder admitted on 5/28 for persistent encephalopathy.    - UTI on admit (Proteus mirabilis), now s/p CTX course  - Hypercalcemia 2/2 lithium toxicity (Lithium changed to Abilify per Psych)  - Hepatic encephalopathy 2/2 to medication non compliance, treated with lactulose and rifaximin     MRI Brain WO was unremarkable for acute neurologic change  EEG w/ frequent left hemispheric electrographic seizures and NCSE  Repeat EEGs without seizure activity x 24 hours, now resolved    6/11 --> acute hypotension, hypoxia --> hypovolemic shock --> Large R peritoneal hematoma --> IR for class A embo --> s/p IR embolization of left lumbar and internal iliac branch foci of arterial extravasation  6/13 --> episode of hypoxia and SOB --> CTA PE without evidence of PE, CT C/A/P without new bleed    - Continued admission to [facility]  - Q1h neurochecks while in ICU  - Q1h vitals while in ICU  - HOB@30  - Keppra 500mg BID  - Thiamine replacement   - Lactulose, titrate to BM  - MAP>65  - Daily CMP, Mag, Phos - replete electrolytes PRN  - Lipid panel, A1c, Coags reviewed  - Q8 CBC, transfuse PRN and replace Plts <50  - BID fibrinogen/PT-INR  - Heparin held in setting of acute bleed, IVC filter in place  - PT/OT/SLP as appropriate  - CM/SW consult for dispo planning    Acute encephalopathy  Multifactorial   See Non-convulsive status epilepticus    Breakthrough seizure  See Non-convulsive status epilepticus    Psychiatric  Bipolar 1 disorder  See Non-convulsive status epilepticus    Alcohol abuse, in remission  See Non-convulsive status epilepticus    Pulmonary  Acute hypoxemic respiratory failure  Intubated today for airway protection  Difficult intubation due to far anterior airway  Weaning as  able    Cardiac/Vascular  Hemorrhagic shock  RESOLVED  See Hypovolemic shock    Hypovolemic shock  6/11 --> acute hypotension, hypoxia --> hypovolemic shock --> Large R peritoneal hematoma --> IR for class A embo   s/p IR embolization of left lumbar and internal iliac branch foci of arterial extravasation  RESOLVED    Hypotension (arterial)  Hypotensive 2/2 hypovolemic/hemorrhagic shock on 6/11  CTA with large retroperitoneal hematoma, s/p IR embolization of left lumbar and internal iliac branch foci of arterial extravasation  RESOLVED     Hematology  Coagulopathy  Heme consulted  Thrombocytopenic and Liver disease     Acute deep vein thrombosis (DVT) of brachial vein of right upper extremity  Noted on 6/8  Argatroban per Heme recs - transitioned to heparin 6/10  HELD 6/11 in setting of retroperitoneal bleed  IVC filter placed 6/14    Deep vein thrombosis (DVT) of femoral vein of right lower extremity  Noted on 6/8  Argatroban per Heme recs - transitioned to heparin  HELD 6/11 in setting of hypovolemic shock 2/2 retroperitoneal hemorrhage   IVC filter placed 6/14    Thrombocytopenia  Likely secondary to hepatic cirrhosis and development of splenomegaly  Has worsened and associated with hgb drop, transfuse to >50K  No signs of bleeding in GI tract or on CT, hemolysis on labs  Hematology consulted, tested negative for HIT    6/11 --> acute hypotension, hypoxia --> hypovolemic shock --> Large R peritoneal hematoma --> IR for class A embo --> s/p IR embolization of left lumbar and internal iliac branch foci of arterial extravasation    - Plts continue to trending down despite transfusion, however, no active bleeding. Will transfuse if plt < 25, or < 50 with active bleeding    Oncology  Anemia  Chronic    Macrocytic anemia  Follow CBC    Endocrine  Hyperammonemia  Stable on current medical regimen   See Non-convulsive status epilepticus      Severe protein-calorie malnutrition  Nutrition consulted. Most recent weight and  BMI monitored-     Measurements:  Wt Readings from Last 1 Encounters:   06/03/23 59.9 kg (132 lb)   Body mass index is 24.14 kg/m².    Patient has been screened and assessed by RD.    Malnutrition Type:  Context: social/environmental circumstances  Level: severe    Malnutrition Characteristic Summary:  Weight Loss (Malnutrition):  (23% x 4 months)  Energy Intake (Malnutrition): less than or equal to 75% for greater than or equal to 1 month  Subcutaneous Fat (Malnutrition): severe depletion (suspecting)  Muscle Mass (Malnutrition): severe depletion  Fluid Accumulation (Malnutrition): other (see comments) (mild-moderate)    Interventions/Recommendations (treatment strategy):  1. Recommend goal of Impact Peptide 1.5 @ 40 ml/hr to provide 1440 kcal, 90 g pro, 739 ml water. Advance/adjust as appropriate. 2. Bolus TF rec: Impact Peptide 1.5 4 cans per day to provide 1500 kcal, 94 g pro, 772 mL water. 3. RD following.    GI  Acute liver failure without hepatic coma  GI/Hepatology following    Hepatic cirrhosis  Continue Lactulose and Rifaximin   Ammonia normalized   Follow coags and fibrinogen      - consider re-consult hepatology     Hepatic encephalopathy  Continue lactulose   See Non-convulsive status epilepticus    Other  Retroperitoneal bleed  6/11 --> acute hypotension, hypoxia --> hypovolemic shock --> Large R peritoneal hematoma --> IR for class A embo --> s/p IR embolization of left lumbar and internal iliac branch foci of arterial extravasation  Trend Hg, repeat CT c/a/p stable          The patient is being Prophylaxed for:  Venous Thromboembolism with: Mechanical  Stress Ulcer with: PPI  Ventilator Pneumonia with: not applicable    Activity Orders          Diet NPO: NPO starting at 06/15 0500    Elevate HOB flat until bedrest complete starting at 06/11 1641    Turn patient starting at 05/28 3590    Progressive Mobility Protocol (mobilize patient to their highest level of functioning at least twice daily)  starting at 05/28 2000        Full Code    Kevin Rollins MD  Neurocritical Care  [facility]

## 2023-06-18 LAB
ABO + RH BLD: NORMAL
ALBUMIN SERPL BCP-MCNC: 2.5 G/DL (ref 3.5–5.2)
ALBUMIN SERPL BCP-MCNC: 2.6 G/DL (ref 3.5–5.2)
ALP SERPL-CCNC: 101 U/L (ref 55–135)
ALP SERPL-CCNC: 101 U/L (ref 55–135)
ALT SERPL W/O P-5'-P-CCNC: 35 U/L (ref 10–44)
ALT SERPL W/O P-5'-P-CCNC: 35 U/L (ref 10–44)
ANION GAP SERPL CALC-SCNC: 7 MMOL/L (ref 8–16)
ANISOCYTOSIS BLD QL SMEAR: SLIGHT
ANISOCYTOSIS BLD QL SMEAR: SLIGHT
APTT PPP: 33.9 SEC (ref 21–32)
APTT PPP: 35.6 SEC (ref 21–32)
AST SERPL-CCNC: 95 U/L (ref 10–40)
AST SERPL-CCNC: 99 U/L (ref 10–40)
BASOPHILS # BLD AUTO: 0 K/UL (ref 0–0.2)
BASOPHILS # BLD AUTO: 0.01 K/UL (ref 0–0.2)
BASOPHILS NFR BLD: 0 % (ref 0–1.9)
BASOPHILS NFR BLD: 0.6 % (ref 0–1.9)
BILIRUB DIRECT SERPL-MCNC: 2.4 MG/DL (ref 0.1–0.3)
BILIRUB SERPL-MCNC: 6 MG/DL (ref 0.1–1)
BILIRUB SERPL-MCNC: 6.3 MG/DL (ref 0.1–1)
BLD GP AB SCN CELLS X3 SERPL QL: NORMAL
BLD PROD TYP BPU: NORMAL
BLOOD UNIT EXPIRATION DATE: NORMAL
BLOOD UNIT TYPE CODE: 5100
BLOOD UNIT TYPE: NORMAL
BUN SERPL-MCNC: 15 MG/DL (ref 6–20)
CALCIUM SERPL-MCNC: 8.1 MG/DL (ref 8.7–10.5)
CHLORIDE SERPL-SCNC: 117 MMOL/L (ref 95–110)
CO2 SERPL-SCNC: 26 MMOL/L (ref 23–29)
CODING SYSTEM: NORMAL
CREAT SERPL-MCNC: 0.4 MG/DL (ref 0.5–1.4)
CROSSMATCH INTERPRETATION: NORMAL
DACRYOCYTES BLD QL SMEAR: ABNORMAL
DIFFERENTIAL METHOD: ABNORMAL
DIFFERENTIAL METHOD: ABNORMAL
DISPENSE STATUS: NORMAL
EOSINOPHIL # BLD AUTO: 0 K/UL (ref 0–0.5)
EOSINOPHIL # BLD AUTO: 0 K/UL (ref 0–0.5)
EOSINOPHIL NFR BLD: 1.2 % (ref 0–8)
EOSINOPHIL NFR BLD: 1.5 % (ref 0–8)
ERYTHROCYTE [DISTWIDTH] IN BLOOD BY AUTOMATED COUNT: 22.4 % (ref 11.5–14.5)
ERYTHROCYTE [DISTWIDTH] IN BLOOD BY AUTOMATED COUNT: 22.4 % (ref 11.5–14.5)
EST. GFR  (NO RACE VARIABLE): >60 ML/MIN/1.73 M^2
FIBRINOGEN PPP-MCNC: 138 MG/DL (ref 182–400)
FIBRINOGEN PPP-MCNC: 141 MG/DL (ref 182–400)
GLUCOSE SERPL-MCNC: 188 MG/DL (ref 70–110)
HCT VFR BLD AUTO: 20.7 % (ref 37–48.5)
HCT VFR BLD AUTO: 21.9 % (ref 37–48.5)
HGB BLD-MCNC: 6.4 G/DL (ref 12–16)
HGB BLD-MCNC: 6.7 G/DL (ref 12–16)
HYPOCHROMIA BLD QL SMEAR: ABNORMAL
HYPOCHROMIA BLD QL SMEAR: ABNORMAL
IMM GRANULOCYTES # BLD AUTO: 0.01 K/UL (ref 0–0.04)
IMM GRANULOCYTES # BLD AUTO: 0.01 K/UL (ref 0–0.04)
IMM GRANULOCYTES NFR BLD AUTO: 0.6 % (ref 0–0.5)
IMM GRANULOCYTES NFR BLD AUTO: 0.7 % (ref 0–0.5)
INR PPP: 2 (ref 0.8–1.2)
INR PPP: 2 (ref 0.8–1.2)
LYMPHOCYTES # BLD AUTO: 0.2 K/UL (ref 1–4.8)
LYMPHOCYTES # BLD AUTO: 0.3 K/UL (ref 1–4.8)
LYMPHOCYTES NFR BLD: 12 % (ref 18–48)
LYMPHOCYTES NFR BLD: 18.2 % (ref 18–48)
MAGNESIUM SERPL-MCNC: 1.8 MG/DL (ref 1.6–2.6)
MCH RBC QN AUTO: 29.9 PG (ref 27–31)
MCH RBC QN AUTO: 30.5 PG (ref 27–31)
MCHC RBC AUTO-ENTMCNC: 30.6 G/DL (ref 32–36)
MCHC RBC AUTO-ENTMCNC: 30.9 G/DL (ref 32–36)
MCV RBC AUTO: 100 FL (ref 82–98)
MCV RBC AUTO: 97 FL (ref 82–98)
MONOCYTES # BLD AUTO: 0.1 K/UL (ref 0.3–1)
MONOCYTES # BLD AUTO: 0.1 K/UL (ref 0.3–1)
MONOCYTES NFR BLD: 10.2 % (ref 4–15)
MONOCYTES NFR BLD: 8.4 % (ref 4–15)
NEUTROPHILS # BLD AUTO: 1 K/UL (ref 1.8–7.7)
NEUTROPHILS # BLD AUTO: 1.3 K/UL (ref 1.8–7.7)
NEUTROPHILS NFR BLD: 69.4 % (ref 38–73)
NEUTROPHILS NFR BLD: 77.2 % (ref 38–73)
NRBC BLD-RTO: 0 /100 WBC
NRBC BLD-RTO: 0 /100 WBC
OVALOCYTES BLD QL SMEAR: ABNORMAL
PHOSPHATE SERPL-MCNC: 1.9 MG/DL (ref 2.7–4.5)
PLATELET # BLD AUTO: 41 K/UL (ref 150–450)
PLATELET # BLD AUTO: 41 K/UL (ref 150–450)
PLATELET BLD QL SMEAR: ABNORMAL
PLATELET BLD QL SMEAR: ABNORMAL
PMV BLD AUTO: 10.5 FL (ref 9.2–12.9)
PMV BLD AUTO: 10.7 FL (ref 9.2–12.9)
POCT GLUCOSE: 183 MG/DL (ref 70–110)
POCT GLUCOSE: 184 MG/DL (ref 70–110)
POCT GLUCOSE: 195 MG/DL (ref 70–110)
POCT GLUCOSE: 197 MG/DL (ref 70–110)
POIKILOCYTOSIS BLD QL SMEAR: SLIGHT
POLYCHROMASIA BLD QL SMEAR: ABNORMAL
POLYCHROMASIA BLD QL SMEAR: ABNORMAL
POTASSIUM SERPL-SCNC: 3.3 MMOL/L (ref 3.5–5.1)
PROT SERPL-MCNC: 4.5 G/DL (ref 6–8.4)
PROT SERPL-MCNC: 4.7 G/DL (ref 6–8.4)
PROTHROMBIN TIME: 20.7 SEC (ref 9–12.5)
PROTHROMBIN TIME: 20.9 SEC (ref 9–12.5)
RBC # BLD AUTO: 2.14 M/UL (ref 4–5.4)
RBC # BLD AUTO: 2.2 M/UL (ref 4–5.4)
SODIUM SERPL-SCNC: 150 MMOL/L (ref 136–145)
SPECIMEN OUTDATE: NORMAL
SPHEROCYTES BLD QL SMEAR: ABNORMAL
TRANS ERYTHROCYTES VOL PATIENT: NORMAL ML
VANCOMYCIN TROUGH SERPL-MCNC: 15 UG/ML (ref 10–22)
WBC # BLD AUTO: 1.37 K/UL (ref 3.9–12.7)
WBC # BLD AUTO: 1.66 K/UL (ref 3.9–12.7)

## 2023-06-18 PROCEDURE — 85610 PROTHROMBIN TIME: CPT | Mod: 91 | Performed by: PSYCHIATRY & NEUROLOGY

## 2023-06-18 PROCEDURE — 25000003 PHARM REV CODE 250: Performed by: PSYCHIATRY & NEUROLOGY

## 2023-06-18 PROCEDURE — 36430 TRANSFUSION BLD/BLD COMPNT: CPT

## 2023-06-18 PROCEDURE — 93010 ELECTROCARDIOGRAM REPORT: CPT | Mod: ,,, | Performed by: INTERNAL MEDICINE

## 2023-06-18 PROCEDURE — 93005 ELECTROCARDIOGRAM TRACING: CPT

## 2023-06-18 PROCEDURE — 80202 ASSAY OF VANCOMYCIN: CPT | Performed by: PSYCHIATRY & NEUROLOGY

## 2023-06-18 PROCEDURE — 94640 AIRWAY INHALATION TREATMENT: CPT

## 2023-06-18 PROCEDURE — 99291 PR CRITICAL CARE, E/M 30-74 MINUTES: ICD-10-PCS | Mod: GC,,, | Performed by: PSYCHIATRY & NEUROLOGY

## 2023-06-18 PROCEDURE — 93010 EKG 12-LEAD: ICD-10-PCS | Mod: ,,, | Performed by: INTERNAL MEDICINE

## 2023-06-18 PROCEDURE — P9021 RED BLOOD CELLS UNIT: HCPCS | Performed by: STUDENT IN AN ORGANIZED HEALTH CARE EDUCATION/TRAINING PROGRAM

## 2023-06-18 PROCEDURE — 63600175 PHARM REV CODE 636 W HCPCS: Performed by: STUDENT IN AN ORGANIZED HEALTH CARE EDUCATION/TRAINING PROGRAM

## 2023-06-18 PROCEDURE — 85025 COMPLETE CBC W/AUTO DIFF WBC: CPT | Mod: 91 | Performed by: PSYCHIATRY & NEUROLOGY

## 2023-06-18 PROCEDURE — 84100 ASSAY OF PHOSPHORUS: CPT

## 2023-06-18 PROCEDURE — 85730 THROMBOPLASTIN TIME PARTIAL: CPT | Performed by: PSYCHIATRY & NEUROLOGY

## 2023-06-18 PROCEDURE — 99900035 HC TECH TIME PER 15 MIN (STAT)

## 2023-06-18 PROCEDURE — 25000003 PHARM REV CODE 250

## 2023-06-18 PROCEDURE — 94761 N-INVAS EAR/PLS OXIMETRY MLT: CPT

## 2023-06-18 PROCEDURE — C9113 INJ PANTOPRAZOLE SODIUM, VIA: HCPCS | Performed by: STUDENT IN AN ORGANIZED HEALTH CARE EDUCATION/TRAINING PROGRAM

## 2023-06-18 PROCEDURE — 99291 CRITICAL CARE FIRST HOUR: CPT | Mod: GC,,, | Performed by: PSYCHIATRY & NEUROLOGY

## 2023-06-18 PROCEDURE — 25000242 PHARM REV CODE 250 ALT 637 W/ HCPCS

## 2023-06-18 PROCEDURE — 80053 COMPREHEN METABOLIC PANEL: CPT

## 2023-06-18 PROCEDURE — 27000221 HC OXYGEN, UP TO 24 HOURS

## 2023-06-18 PROCEDURE — 85610 PROTHROMBIN TIME: CPT | Performed by: STUDENT IN AN ORGANIZED HEALTH CARE EDUCATION/TRAINING PROGRAM

## 2023-06-18 PROCEDURE — 51798 US URINE CAPACITY MEASURE: CPT

## 2023-06-18 PROCEDURE — 20000000 HC ICU ROOM

## 2023-06-18 PROCEDURE — 63600175 PHARM REV CODE 636 W HCPCS

## 2023-06-18 PROCEDURE — P9047 ALBUMIN (HUMAN), 25%, 50ML: HCPCS | Mod: JZ,JG

## 2023-06-18 PROCEDURE — 83735 ASSAY OF MAGNESIUM: CPT

## 2023-06-18 PROCEDURE — 63600175 PHARM REV CODE 636 W HCPCS: Performed by: PSYCHIATRY & NEUROLOGY

## 2023-06-18 PROCEDURE — 85384 FIBRINOGEN ACTIVITY: CPT | Performed by: STUDENT IN AN ORGANIZED HEALTH CARE EDUCATION/TRAINING PROGRAM

## 2023-06-18 PROCEDURE — 85384 FIBRINOGEN ACTIVITY: CPT | Mod: 91 | Performed by: PSYCHIATRY & NEUROLOGY

## 2023-06-18 PROCEDURE — 94660 CPAP INITIATION&MGMT: CPT

## 2023-06-18 PROCEDURE — 25000003 PHARM REV CODE 250: Performed by: STUDENT IN AN ORGANIZED HEALTH CARE EDUCATION/TRAINING PROGRAM

## 2023-06-18 PROCEDURE — 85730 THROMBOPLASTIN TIME PARTIAL: CPT | Mod: 91 | Performed by: PSYCHIATRY & NEUROLOGY

## 2023-06-18 RX ORDER — ALBUMIN HUMAN 250 G/1000ML
12.5 SOLUTION INTRAVENOUS ONCE
Status: COMPLETED | OUTPATIENT
Start: 2023-06-18 | End: 2023-06-18

## 2023-06-18 RX ORDER — HYDROCODONE BITARTRATE AND ACETAMINOPHEN 500; 5 MG/1; MG/1
TABLET ORAL
Status: DISCONTINUED | OUTPATIENT
Start: 2023-06-18 | End: 2023-06-19

## 2023-06-18 RX ORDER — FUROSEMIDE 10 MG/ML
20 INJECTION INTRAMUSCULAR; INTRAVENOUS ONCE
Status: COMPLETED | OUTPATIENT
Start: 2023-06-18 | End: 2023-06-18

## 2023-06-18 RX ORDER — LEVETIRACETAM 500 MG/5ML
500 INJECTION, SOLUTION, CONCENTRATE INTRAVENOUS EVERY 12 HOURS
Status: DISCONTINUED | OUTPATIENT
Start: 2023-06-18 | End: 2023-06-22

## 2023-06-18 RX ADMIN — FUROSEMIDE 20 MG: 10 INJECTION, SOLUTION INTRAMUSCULAR; INTRAVENOUS at 10:06

## 2023-06-18 RX ADMIN — IPRATROPIUM BROMIDE AND ALBUTEROL SULFATE 3 ML: 2.5; .5 SOLUTION RESPIRATORY (INHALATION) at 08:06

## 2023-06-18 RX ADMIN — PANTOPRAZOLE SODIUM 40 MG: 40 INJECTION, POWDER, FOR SOLUTION INTRAVENOUS at 08:06

## 2023-06-18 RX ADMIN — PIPERACILLIN SODIUM AND TAZOBACTAM SODIUM 4.5 G: 4; .5 INJECTION, POWDER, LYOPHILIZED, FOR SOLUTION INTRAVENOUS at 06:06

## 2023-06-18 RX ADMIN — ACETYLCYSTEINE 4 ML: 100 INHALANT RESPIRATORY (INHALATION) at 12:06

## 2023-06-18 RX ADMIN — IPRATROPIUM BROMIDE AND ALBUTEROL SULFATE 3 ML: 2.5; .5 SOLUTION RESPIRATORY (INHALATION) at 12:06

## 2023-06-18 RX ADMIN — RIFAXIMIN 550 MG: 550 TABLET ORAL at 08:06

## 2023-06-18 RX ADMIN — INSULIN ASPART 2 UNITS: 100 INJECTION, SOLUTION INTRAVENOUS; SUBCUTANEOUS at 10:06

## 2023-06-18 RX ADMIN — LEVETIRACETAM 500 MG: 100 INJECTION, SOLUTION INTRAVENOUS at 10:06

## 2023-06-18 RX ADMIN — Medication 800 MG: at 06:06

## 2023-06-18 RX ADMIN — ACETYLCYSTEINE 4 ML: 100 INHALANT RESPIRATORY (INHALATION) at 09:06

## 2023-06-18 RX ADMIN — OLANZAPINE 5 MG: 2.5 TABLET, FILM COATED ORAL at 09:06

## 2023-06-18 RX ADMIN — IPRATROPIUM BROMIDE AND ALBUTEROL SULFATE 3 ML: 2.5; .5 SOLUTION RESPIRATORY (INHALATION) at 04:06

## 2023-06-18 RX ADMIN — ALBUMIN HUMAN 12.5 G: 0.25 SOLUTION INTRAVENOUS at 10:06

## 2023-06-18 RX ADMIN — Medication 100 MG: at 08:06

## 2023-06-18 RX ADMIN — PIPERACILLIN SODIUM AND TAZOBACTAM SODIUM 4.5 G: 4; .5 INJECTION, POWDER, LYOPHILIZED, FOR SOLUTION INTRAVENOUS at 10:06

## 2023-06-18 RX ADMIN — FOLIC ACID 1 MG: 1 TABLET ORAL at 08:06

## 2023-06-18 RX ADMIN — ARIPIPRAZOLE 5 MG: 5 TABLET ORAL at 08:06

## 2023-06-18 RX ADMIN — IPRATROPIUM BROMIDE AND ALBUTEROL SULFATE 3 ML: 2.5; .5 SOLUTION RESPIRATORY (INHALATION) at 01:06

## 2023-06-18 RX ADMIN — POTASSIUM & SODIUM PHOSPHATES POWDER PACK 280-160-250 MG 2 PACKET: 280-160-250 PACK at 06:06

## 2023-06-18 RX ADMIN — POTASSIUM BICARBONATE 35 MEQ: 391 TABLET, EFFERVESCENT ORAL at 06:06

## 2023-06-18 RX ADMIN — IPRATROPIUM BROMIDE AND ALBUTEROL SULFATE 3 ML: 2.5; .5 SOLUTION RESPIRATORY (INHALATION) at 09:06

## 2023-06-18 RX ADMIN — INSULIN ASPART 2 UNITS: 100 INJECTION, SOLUTION INTRAVENOUS; SUBCUTANEOUS at 06:06

## 2023-06-18 RX ADMIN — SILODOSIN 4 MG: 4 CAPSULE ORAL at 08:06

## 2023-06-18 RX ADMIN — INSULIN ASPART 2 UNITS: 100 INJECTION, SOLUTION INTRAVENOUS; SUBCUTANEOUS at 04:06

## 2023-06-18 RX ADMIN — ACETYLCYSTEINE 4 ML: 100 INHALANT RESPIRATORY (INHALATION) at 01:06

## 2023-06-18 RX ADMIN — LEVETIRACETAM 500 MG: 500 SOLUTION ORAL at 08:06

## 2023-06-18 RX ADMIN — VANCOMYCIN HYDROCHLORIDE 1000 MG: 1 INJECTION, POWDER, LYOPHILIZED, FOR SOLUTION INTRAVENOUS at 10:06

## 2023-06-18 RX ADMIN — INSULIN ASPART 1 UNITS: 100 INJECTION, SOLUTION INTRAVENOUS; SUBCUTANEOUS at 12:06

## 2023-06-18 RX ADMIN — ACETYLCYSTEINE 4 ML: 100 INHALANT RESPIRATORY (INHALATION) at 08:06

## 2023-06-18 RX ADMIN — PIPERACILLIN SODIUM AND TAZOBACTAM SODIUM 4.5 G: 4; .5 INJECTION, POWDER, LYOPHILIZED, FOR SOLUTION INTRAVENOUS at 02:06

## 2023-06-18 RX ADMIN — VANCOMYCIN HYDROCHLORIDE 1000 MG: 1 INJECTION, POWDER, LYOPHILIZED, FOR SOLUTION INTRAVENOUS at 11:06

## 2023-06-18 NOTE — PROGRESS NOTES
Pharmacokinetic Assessment Follow Up: IV Vancomycin    Vancomycin serum concentration assessment/plan:  The trough level was drawn correctly and can be used to guide therapy at this time. The measurement is within the desired definitive target range of 15 to 20 mcg/mL.  Renal function stable   Continue regimen Vancomycin 1000 mg IV every 12 hours with next serum trough concentration measured at 2230 prior to 4th dose on 6/19.    Drug levels (last 3 results):  Recent Labs   Lab Result Units 06/18/23  1045   Vancomycin-Trough ug/mL 15.0       Pharmacy will continue to follow and monitor vancomycin.    Please contact pharmacy at extension [phone] for questions regarding this assessment.    Thank you for the consult,   Shae Young, SandyD       Patient brief summary:  Marely Hamilton is a 58 y.o. female initiated on antimicrobial therapy with IV Vancomycin for treatment of lower respiratory infection.    Actual Body Weight:   95.9 kg    Renal Function:   Estimated Creatinine Clearance: 122.7 mL/min (A) (based on SCr of 0.4 mg/dL (L)).    Dialysis Method (if applicable):  N/A    Drug Allergies:   Review of patient's allergies indicates:   Allergen Reactions    Sulfa (sulfonamide antibiotics) Rash    Codeine Nausea And Vomiting       CBC (last 72 hours):  Recent Labs   Lab Result Units 06/16/23  0231 06/16/23 2054 06/17/23  0254 06/18/23  0336   WBC K/uL 2.27* 1.80* 1.59* 1.37*   Hemoglobin g/dL 7.3* 7.4* 7.4* 6.7*   Hematocrit % 23.6* 23.9* 24.0* 21.9*   Platelets K/uL 37* 34* 36* 41*   Gran % % 76.7* 84.0* 80.5* 69.4   Lymph % % 11.5* 10.0* 8.2* 18.2   Mono % % 8.8 4.0 8.8 10.2   Eosinophil % % 2.2 2.0 0.6 1.5   Basophil % % 0.4 0.0 0.0 0.0   Differential Method  Automated Manual Automated Automated       Metabolic Panel (last 72 hours):  Recent Labs   Lab Result Units 06/15/23  1303 06/15/23  2025 06/16/23  0231 06/16/23  2312 06/17/23  0254 06/17/23  2242 06/18/23  0336   Sodium mmol/L 147*  --  149*  --  152*  --   150*   Potassium mmol/L 4.1  --  3.9  --  3.1*  --  3.3*   Chloride mmol/L 115*  --  117*  --  115*  --  117*   CO2 mmol/L 25  --  27  --  27  --  26   Glucose mg/dL 122*  --  174*  --  156*  --  188*   BUN mg/dL 15  --  15  --  15  --  15   Creatinine mg/dL 0.4*  --  0.4*  --  0.4*  --  0.4*   Albumin g/dL  --  2.5* 2.3* 2.8* 2.7* 2.6* 2.5*   Total Bilirubin mg/dL  --  4.5* 4.2* 5.4* 5.8* 6.3* 6.0*   Alkaline Phosphatase U/L  --  126 127 109 108 101 101   AST U/L  --  30 28 42* 50* 99* 95*   ALT U/L  --  21 19 22 25 35 35   Magnesium mg/dL 1.8  --  1.9  --  1.7  --  1.8   Phosphorus mg/dL 2.9  --  2.3*  --  2.2*  --  1.9*       Vancomycin Administrations:  vancomycin given in the last 96 hours                     vancomycin (VANCOCIN) 1,000 mg in dextrose 5 % (D5W) 250 mL IVPB (Vial-Mate) (mg) 1,000 mg New Bag 06/18/23 1141     1,000 mg New Bag 06/17/23 2315      Restarted  1126     1,000 mg New Bag  1125    vancomycin 1,500 mg in dextrose 5 % (D5W) 250 mL IVPB (Vial-Mate) (mg) 1,500 mg New Bag 06/16/23 2349                    Microbiologic Results:  Microbiology Results (last 7 days)       ** No results found for the last 168 hours. **

## 2023-06-18 NOTE — SUBJECTIVE & OBJECTIVE
Interval History:  As above.    Review of Systems   Unable to perform ROS: Other on bipap     Objective:     Vitals:  Temp: 98.6 °F (37 °C)  Pulse: 102  Rhythm: sinus tachycardia  BP: 130/62  MAP (mmHg): 89  Resp: (!) 22  SpO2: (!) 92 %  Oxygen Concentration (%): 30    Temp  Min: 97.9 °F (36.6 °C)  Max: 99.9 °F (37.7 °C)  Pulse  Min: 101  Max: 118  BP  Min: 127/60  Max: 176/77  MAP (mmHg)  Min: 86  Max: 116  Resp  Min: 14  Max: 35  SpO2  Min: 90 %  Max: 98 %  Oxygen Concentration (%)  Min: 30  Max: 30    06/17 0701 - 06/18 0700  In: 1306.1 [I.V.:50]  Out: 1627 [Urine:1477]   Unmeasured Output  Urine Occurrence: 1  Stool Occurrence: 1  Emesis Occurrence: 0  Pad Count: 1        Physical Exam  Vitals and nursing note reviewed.   Constitutional:       General: She is not in acute distress.     Appearance: She is ill-appearing.      Comments: Opens eye spontaneously, following commands   HENT:      Head: Normocephalic and atraumatic.   Eyes:      General: Scleral icterus present.      Extraocular Movements: Extraocular movements intact.      Pupils: Pupils are equal, round, and reactive to light.   Cardiovascular:      Rate and Rhythm: Normal rate.   Pulmonary:      Comments:   Noninvasive mechanically ventilated  Abdominal:      Palpations: Abdomen is distended but soft  Musculoskeletal:      Cervical back: Neck supple.   Skin:     General: Skin is warm.   Neurological:      Mental Status: She is alert.      Comments:   Alert  On BiPap unable to test orientation but nods appropriately and mouths words  Follows commands x4  Pupils equal, reactive  Face grossly symmetric   Moves all extremities antigravity and to commands       Medications:  Continuous Scheduledacetylcysteine 100 mg/ml (10%), 4 mL, Q6H  albuterol-ipratropium, 3 mL, Q4H  ARIPiprazole, 5 mg, Daily  folic acid, 1 mg, Daily  levetiracetam, 500 mg, BID  melatonin, 6 mg, Nightly  OLANZapine, 5 mg, BID  pantoprazole, 40 mg, BID  piperacillin-tazobactam (Zosyn)  IV (PEDS and ADULTS) (extended infusion is not appropriate), 4.5 g, Q8H  rifAXIMin, 550 mg, BID  silodosin, 4 mg, Daily  thiamine, 100 mg, Daily  vancomycin (VANCOCIN) IVPB, 15 mg/kg, Q12H    PRNsodium chloride, , Q24H PRN  sodium chloride, , Q24H PRN  aluminum-magnesium hydroxide-simethicone, 30 mL, QID PRN  dextrose 10%, 12.5 g, PRN  dextrose 10%, 25 g, PRN  dextrose, 15 g, PRN  dextrose, 30 g, PRN  glucagon (human recombinant), 1 mg, PRN  insulin aspart U-100, 1-10 Units, Q6H PRN  labetalol, 10 mg, Q4H PRN  magnesium oxide, 800 mg, PRN  magnesium oxide, 800 mg, PRN  naloxone, 0.02 mg, PRN  ondansetron, 4 mg, Q8H PRN  potassium bicarbonate, 35 mEq, PRN  potassium bicarbonate, 50 mEq, PRN  potassium bicarbonate, 60 mEq, PRN  potassium, sodium phosphates, 2 packet, PRN  potassium, sodium phosphates, 2 packet, PRN  potassium, sodium phosphates, 2 packet, PRN  racepinephrine, 0.5 mL, Q4H PRN  simethicone, 1 tablet, QID PRN  sodium chloride 0.9%, 10 mL, Q8H PRN  vancomycin - pharmacy to dose, , pharmacy to manage frequency      Today I personally reviewed pertinent medications, lines/drains/airways, imaging, laboratory results,    Hg 6.7.    Diet  Diet NPO  Diet NPO

## 2023-06-18 NOTE — ASSESSMENT & PLAN NOTE
Continue Lactulose and Rifaximin   Ammonia normalized   Follow coags and fibrinogen    Consider re-consult hepatology as stabilizes

## 2023-06-18 NOTE — PROGRESS NOTES
[facility] - Neuro Critical Care  Neurocritical Care  Progress Note    Admit Date: 5/28/2023  Service Date: 06/18/2023  Length of Stay: 21    Subjective:     Chief Complaint: Non-convulsive status epilepticus    History of Present Illness: Marely Hamilton is a 58 year old female with a medical history significant for EtOH induced hepatic cirrhosis, seizure disorder on outpatient LEV and ZNS and bipolar disorder who was admitted to [facility] on 5/28 as a transfer from OSH for evaluation of persistent encephalopathy concerning for NCSE vs hepatic encephalopathy. History is obtained from chart as patient is unresponsive and unable to assist. Initially presented to [facility] on 5/18 for encephalopathy and thought to be multifactorial including UTI (Proteus mirabilis s/p CTX course), hypercalcemia 2/2 lithium toxicity (Lithium changed to Abilify per Psych), as well as hepatic encephalopathy secondary to medication non compliance. She was treated with lactulose and rifaximin with no improvement in her mental status. EEG showed generalized slowing as well as triphasic discharges in the left hemisphere. MRI Brain WO was unremarkable for acute neurologic change. Decision was made to transfer patient to [facility] for higher level of care and ongoing evaluation and management. On arrival, mental status exam has waxed and waned with patient being intermittently verbal and following commands. NH4 48.    NCC is consulted on hospital day 4 for admission after EEG showed frequent left hemispheric electrographic seizures lasting on average 10-30 seconds with prolonged seizures over 1.5 hours also noted. Per chart review, patient home dose of LEV increased from 1000mg to 1500mg BID, ZNS increased to 200mg. Vimpat 150mg BID added per General Neurology (had not been given prior to consult). On initial evaluation, patient is not responsive to voice or pain. Upward gaze noted. Decision made to transfer patient to [facility] for higher level of care and  airway watch.       Hospital Course: 06/02/2023 Tachycardic and hypotensive, transferred for development of mostly right hemispheric status, LOC exam remains poor  Intubated for airway protection, EEG has changed to mostly include triphasics with no definite seizure activity per Dr Boss, propofol stopped and AEDs simplified to keppra 1000mg bid only, wean off pressor, give colloids with crystalloid resuscitation  06/03/2023: remains on persistant significant pressor support despite adequate hydration, problems with third spacing and may have pulmonary hypertension as well, consider trial of stress steroids if hgb stabilizes  06/04/2023: levo down to 0.08mcg, start and advance TFs, vaso at 0.04U, ammonia has been stable, slight rise in tbili, check direct bili, hgb and plts improved, no clear source of bleeding, no source of bleeding on CT abdomen, consider superimposed hemolysis  06/05/2023 NAEON. Neurologic exam continues to improve, following commands in all extremities. Levo 0.02, weaning as able. Vaso discontinued, MAP>65. Daily SBT, monitor and hold TF at midnight for possible extubation tomorrow. Hemolysis work up ongoing, trending CBC. Continue CTX for SBP prophylaxis.   06/06/2023 Awake and following commands. SBT with low tidal volumes. Remains intubated for airway protection at this time with possible extubation tomorrow with continued neurologic improvement. Levo discontinued, maintaining SBP<65. Concern for hemolytic anemia related to autoimmune process vs hepatic dysfunction (elevated reticulocyte count and decreased haptoglobin).  06/07/2023 Rested on AC overnight due to low TV and fatigue. SBT with pressure support 10/5 this am, weaning ventilator as tolerated. Continue tube feeds with goal to optimize nutrition. Ammonia elevated, continue lactulose to encourage bowel movement.   06/08/2023 Failed SBT due to apnea. Some breaths on SIMV. Awake and following commands. Ammonia trending down (44) with  BMs, continue lactulose. Advance nutritional support with goal to increase strength towards extubation. Plt transfusions PRN. Cryo for fibrinogen <100.  06/09/2023 Venous US with R brachial and femoral DVT. Dicussed with Hematology with recommendation to start Argatroban with plts>50. Daily SBT with apnea, maintain on SIMV with backup rate of 10. Hyperammonemia resolved.   06/10/2023 patient is more alert and awake, tolerated SBT. Hematology agreed to switch to heparin for DVT given negative for HIT  06/11/2023   On AM rounds, patient was noted to be hypoxic and tachycardic. Hypoxia resolved with ventilator changes but patient continued to appear uncomfortable. Patient lied flat with noted improvement in oxygenation. Patient became acutely hypotensive, tachycardiac and hypoxic not responsive to ventilator changes. IVF bolus + Burton bump x3 + initiation of vasopressin due to concern for hypovolemic shock. Levophed added due to persistent hypertension. L femoral arterial line and R IJ trialysis placed emergently. STAT Hemoglobin 4.4. Received protamine for heparin reversal, 3u Plts and 3 FFP and 2 pRBC. STAT CTA abdo/pelvis with L retroperitoneal hematoma with + spot sign. IR consulted and patient taken class A for diagnostic angiogram with possible emobolization. Pressors weaned after volume resuscitation. Family updated over phone.   06/12/2023 s/p IR embolization of left lumbar and internal iliac branch foci of arterial extravasation. Hemoglobin stable, continue to trend CBC q8 with transfusion of pRBC for Hg<7. Restart trickle feeds. Albumin and lasix for dieresis. Low threshold for antibiotics given risk of SBP and RP hematoma. Unlikely to tolerate AC, will discuss placement of IVC filter with IR.   06/13/2023 Attempted to wean FiO2 overnight with noted drop of pO2. FiO2 increased to 55% and patient received albumin and was diuresed. Weaning this am with ABGs. Remains on fentanyl 12.5, neurologic exam unchanged. Hg  stable. Post AM rounds, patient with acute increase in O2 needs, STAT CTA chest/abd/pelvis to assess for PE vs further hemorrhage stable and without new findings. Continue current management.   06/14/2023 NAEON. Hg stable at 7.6. Plt 44, transfuse for <50. Pending IVC filter per IR for DVT burden with contraindications to AC.  06/15/2023 s/p IVC filter placement. Daily SBT, complicated by anxiety. Remains on spontaneous mode, 10/5, 40% FiO2 with goal to wean as able.   06/16/2023 Rested on AC overnight. Plts low, received 1 pack overnight. Daily SBT with plan to extubate as able.   06/17/2023 extubated on BiPap yesterday, weaning off FiO2. Plts trended down again, received transfusion overnight, no sign of active bleeding.   06/18/2023 Hg 6.7, receiving 1u pRBC. Plan to wean Bipap to high flow NC.      Interval History:  As above.    Review of Systems   Unable to perform ROS: Other on bipap     Objective:     Vitals:  Temp: 98.6 °F (37 °C)  Pulse: 102  Rhythm: sinus tachycardia  BP: 130/62  MAP (mmHg): 89  Resp: (!) 22  SpO2: (!) 92 %  Oxygen Concentration (%): 30    Temp  Min: 97.9 °F (36.6 °C)  Max: 99.9 °F (37.7 °C)  Pulse  Min: 101  Max: 118  BP  Min: 127/60  Max: 176/77  MAP (mmHg)  Min: 86  Max: 116  Resp  Min: 14  Max: 35  SpO2  Min: 90 %  Max: 98 %  Oxygen Concentration (%)  Min: 30  Max: 30    06/17 0701 - 06/18 0700  In: 1306.1 [I.V.:50]  Out: 1627 [Urine:1477]   Unmeasured Output  Urine Occurrence: 1  Stool Occurrence: 1  Emesis Occurrence: 0  Pad Count: 1        Physical Exam  Vitals and nursing note reviewed.   Constitutional:       General: She is not in acute distress.     Appearance: She is ill-appearing.      Comments: Opens eye spontaneously, following commands   HENT:      Head: Normocephalic and atraumatic.   Eyes:      General: Scleral icterus present.      Extraocular Movements: Extraocular movements intact.      Pupils: Pupils are equal, round, and reactive to light.   Cardiovascular:       Rate and Rhythm: Normal rate.   Pulmonary:      Comments:   Noninvasive mechanically ventilated  Abdominal:      Palpations: Abdomen is distended but soft  Musculoskeletal:      Cervical back: Neck supple.   Skin:     General: Skin is warm.   Neurological:      Mental Status: She is alert.      Comments:   Alert  On BiPap unable to test orientation but nods appropriately and mouths words  Follows commands x4  Pupils equal, reactive  Face grossly symmetric   Moves all extremities antigravity and to commands       Medications:  Continuous Scheduledacetylcysteine 100 mg/ml (10%), 4 mL, Q6H  albuterol-ipratropium, 3 mL, Q4H  ARIPiprazole, 5 mg, Daily  folic acid, 1 mg, Daily  levetiracetam, 500 mg, BID  melatonin, 6 mg, Nightly  OLANZapine, 5 mg, BID  pantoprazole, 40 mg, BID  piperacillin-tazobactam (Zosyn) IV (PEDS and ADULTS) (extended infusion is not appropriate), 4.5 g, Q8H  rifAXIMin, 550 mg, BID  silodosin, 4 mg, Daily  thiamine, 100 mg, Daily  vancomycin (VANCOCIN) IVPB, 15 mg/kg, Q12H    PRNsodium chloride, , Q24H PRN  sodium chloride, , Q24H PRN  aluminum-magnesium hydroxide-simethicone, 30 mL, QID PRN  dextrose 10%, 12.5 g, PRN  dextrose 10%, 25 g, PRN  dextrose, 15 g, PRN  dextrose, 30 g, PRN  glucagon (human recombinant), 1 mg, PRN  insulin aspart U-100, 1-10 Units, Q6H PRN  labetalol, 10 mg, Q4H PRN  magnesium oxide, 800 mg, PRN  magnesium oxide, 800 mg, PRN  naloxone, 0.02 mg, PRN  ondansetron, 4 mg, Q8H PRN  potassium bicarbonate, 35 mEq, PRN  potassium bicarbonate, 50 mEq, PRN  potassium bicarbonate, 60 mEq, PRN  potassium, sodium phosphates, 2 packet, PRN  potassium, sodium phosphates, 2 packet, PRN  potassium, sodium phosphates, 2 packet, PRN  racepinephrine, 0.5 mL, Q4H PRN  simethicone, 1 tablet, QID PRN  sodium chloride 0.9%, 10 mL, Q8H PRN  vancomycin - pharmacy to dose, , pharmacy to manage frequency      Today I personally reviewed pertinent medications, lines/drains/airways, imaging, laboratory  results,    Hg 6.7.    Diet  Diet NPO  Diet NPO          Assessment/Plan:     Neuro  * Non-convulsive status epilepticus  58F with EtOH induced hepatic cirrhosis, seizure disorder on outpatient LEV and ZNS and bipolar disorder admitted on 5/28 for persistent encephalopathy.    - UTI on admit (Proteus mirabilis), now s/p CTX course  - Hypercalcemia 2/2 lithium toxicity (Lithium changed to Abilify per Psych)  - Hepatic encephalopathy 2/2 to medication non compliance, treated with lactulose and rifaximin     MRI Brain WO was unremarkable for acute neurologic change  EEG w/ frequent left hemispheric electrographic seizures and NCSE  Repeat EEGs without seizure activity x 24 hours, now resolved    6/11 --> acute hypotension, hypoxia --> hypovolemic shock --> Large R peritoneal hematoma --> IR for class A embo --> s/p IR embolization of left lumbar and internal iliac branch foci of arterial extravasation  6/13 --> episode of hypoxia and SOB --> CTA PE without evidence of PE, CT C/A/P without new bleed    - Continued admission to [facility]  - Q2h neurochecks while in ICU  - Q2h vitals while in ICU  - HOB@30  - Keppra 500mg BID  - Thiamine replacement   - Lactulose, titrate to BM  - MAP>65  - Daily CMP, Mag, Phos - replete electrolytes PRN  - Lipid panel, A1c, Coags reviewed  - Daily CBC, transfuse PRN and replace Plts <25  - BID fibrinogen/PT-INR  - Heparin held in setting of acute bleed, IVC filter in place  - PT/OT/SLP as appropriate  - CM/SW consult for dispo planning    Acute encephalopathy  Multifactorial   See Non-convulsive status epilepticus    Breakthrough seizure  See Non-convulsive status epilepticus    Psychiatric  Bipolar 1 disorder  See Non-convulsive status epilepticus    Alcohol abuse, in remission  See Non-convulsive status epilepticus    Pulmonary  Acute hypoxemic respiratory failure  Intubated today for airway protection  Difficult intubation due to far anterior airway  Extubated to bipap 6/16.  Weaning bipap  to high flow NC as able    Cardiac/Vascular  Hemorrhagic shock  RESOLVED  See Hypovolemic shock    Hypovolemic shock  6/11 --> acute hypotension, hypoxia --> hypovolemic shock --> Large R peritoneal hematoma --> IR for class A embo   s/p IR embolization of left lumbar and internal iliac branch foci of arterial extravasation  RESOLVED    Hypotension (arterial)  Hypotensive 2/2 hypovolemic/hemorrhagic shock on 6/11  CTA with large retroperitoneal hematoma, s/p IR embolization of left lumbar and internal iliac branch foci of arterial extravasation  RESOLVED     Renal/  Hypernatremia-resolved as of 6/15/2023  Daily CMP  Improved with free water administration  RESOLVED     Hematology  Coagulopathy  Heme consulted  Thrombocytopenic and Liver disease     Acute deep vein thrombosis (DVT) of brachial vein of right upper extremity  Noted on 6/8  Argatroban per Heme recs - transitioned to heparin 6/10  HELD 6/11 in setting of retroperitoneal bleed  IVC filter placed 6/14    Deep vein thrombosis (DVT) of femoral vein of right lower extremity  Noted on 6/8  Argatroban per Heme recs - transitioned to heparin  HELD 6/11 in setting of hypovolemic shock 2/2 retroperitoneal hemorrhage   IVC filter placed 6/14    Thrombocytopenia  Likely secondary to hepatic cirrhosis and development of splenomegaly  Has worsened and associated with hgb drop, transfuse to >50K  No signs of bleeding in GI tract or on CT, hemolysis on labs  Hematology consulted, tested negative for HIT    6/11 --> acute hypotension, hypoxia --> hypovolemic shock --> Large R peritoneal hematoma --> IR for class A embo --> s/p IR embolization of left lumbar and internal iliac branch foci of arterial extravasation    Plts continue to trending down despite transfusion, however, no active bleeding. Will transfuse if plt < 25, or < 50 with active bleeding    Oncology  Anemia  Chronic    Macrocytic anemia  Follow CBC    Endocrine  Hyperammonemia  Stable on current medical  regimen   See Non-convulsive status epilepticus      Severe protein-calorie malnutrition  Nutrition consulted. Most recent weight and BMI monitored-     Measurements:  Wt Readings from Last 1 Encounters:   06/03/23 59.9 kg (132 lb)   Body mass index is 24.14 kg/m².    Patient has been screened and assessed by RD.    Malnutrition Type:  Context: social/environmental circumstances  Level: severe    Malnutrition Characteristic Summary:  Weight Loss (Malnutrition):  (23% x 4 months)  Energy Intake (Malnutrition): less than or equal to 75% for greater than or equal to 1 month  Subcutaneous Fat (Malnutrition): severe depletion (suspecting)  Muscle Mass (Malnutrition): severe depletion  Fluid Accumulation (Malnutrition): other (see comments) (mild-moderate)    Interventions/Recommendations (treatment strategy):  1. Recommend goal of Impact Peptide 1.5 @ 40 ml/hr to provide 1440 kcal, 90 g pro, 739 ml water. Advance/adjust as appropriate. 2. Bolus TF rec: Impact Peptide 1.5 4 cans per day to provide 1500 kcal, 94 g pro, 772 mL water. 3. RD following.    GI  Acute liver failure without hepatic coma  GI/Hepatology    Hepatic cirrhosis  Continue Lactulose and Rifaximin   Ammonia normalized   Follow coags and fibrinogen    Consider re-consult hepatology as stabilizes     Hepatic encephalopathy  Continue lactulose   See Non-convulsive status epilepticus    Other  Retroperitoneal bleed  6/11 --> acute hypotension, hypoxia --> hypovolemic shock --> Large R peritoneal hematoma --> IR for class A embo --> s/p IR embolization of left lumbar and internal iliac branch foci of arterial extravasation  Trend Hg, repeat CT c/a/p stable          The patient is being Prophylaxed for:  Venous Thromboembolism with: Mechanical  Stress Ulcer with: Not Applicable   Ventilator Pneumonia with: not applicable    Activity Orders          Diet NPO: NPO starting at 06/15 0500    Elevate HOB flat until bedrest complete starting at 06/11 1641    Turn  patient starting at 05/28 2253    Progressive Mobility Protocol (mobilize patient to their highest level of functioning at least twice daily) starting at 05/28 2000        Full Code    Cameron Yadav MD  Neurocritical Care  [facility] - Neuro Critical Care

## 2023-06-18 NOTE — PLAN OF CARE
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, Laennec's  No date: Depression  No date: Encounter for blood transfusion  6/15/2015: Epistaxis  No date: Fatigue  No date: Glaucoma  No date: Hematuria  6/15/2015: Hepatic encephalopathy  No date: Hepatic enlargement  No date: History of psychiatric care  No date: History of psychiatric hospitalization  6/15/2015: History of seizure      Comment:  1  6/15/2015: hx of intentional Tylenol overdose      Comment:  2005- situational and hx of bipolar  No date: Hx of psychiatric care  9/18/2015: Macrocytic anemia      Comment:  6 units PRBC since June 2015  No date: Jeana  No date: Osteoarthritis  6/15/2015: Other ascites  No date: Psychiatric exam requested by authority  No date: Psychiatric problem  9/26/2019: Psychosis  No date: Renal disorder  No date: Seizures  No date: Self-harming behavior  No date: Suicide attempt  No date: Therapy  6/15/2015: Thrombocytopenia    Past Surgical History:  No date: COSMETIC SURGERY  No date: ESOPHAGOGASTRODUODENOSCOPY    Individualization:   1. Patient likes to listen to music.    Restraints:   1. Bilateral soft wrist restraints initiated 6/6/23 1930 for pulling of lines. [facility] for DC criteria.         Outcome: Ongoing, Progressing  Goal: Absence of Hospital-Acquired Illness or Injury  Outcome: Ongoing, Progressing    [facility] Care Plan    POC reviewed with Marely Hamilton and family at 1400. Pt verbalized understanding. Questions and concerns addressed. No acute events today. Pt  progressing toward goals. Will continue to monitor. See below and flowsheets for full assessment and VS info.     1 unit prbc given.  Chest xray performed.  Lasix and albumin given  Patient transitioned to comfort flow 6 liters  Arterial line in place, please pull in AM          Is this a stroke patient? no    Neuro:  Cheryl Coma Scale  Best Eye Response: 4-->(E4) spontaneous  Best Motor Response: 6-->(M6) obeys commands  Best Verbal Response: 4-->(V4) confused  Cheryl Coma Scale Score: 14  Assessment Qualifiers: patient not sedated/intubated, no eye obstruction present  Pupil PERRLA: no     24 hr Temp:  [98.6 °F (37 °C)-99.9 °F (37.7 °C)]     CV:   Rhythm: sinus tachycardia  BP goals:   SBP < 180  MAP > 65    Resp:      Vent Mode: Spont  Set Rate: 12 BPM  Oxygen Concentration (%): 30  Vt Set: 340 mL  PEEP/CPAP: 5 cmH20  Pressure Support: 10 cmH20    Plan: N/A    GI/:     Diet/Nutrition Received: tube feeding, ice chips  Last Bowel Movement: 06/18/23  Voiding Characteristics: external catheter    Intake/Output Summary (Last 24 hours) at 6/18/2023 1726  Last data filed at 6/18/2023 1719  Gross per 24 hour   Intake 1956.5 ml   Output 1377 ml   Net 579.5 ml     Unmeasured Output  Urine Occurrence: 1  Stool Occurrence: 1  Emesis Occurrence: 0  Pad Count: 1    Labs/Accuchecks:  Recent Labs   Lab 06/18/23  1244   WBC 1.66*   RBC 2.14*   HGB 6.4*   HCT 20.7*   PLT 41*      Recent Labs   Lab 06/18/23  0336   *   K 3.3*   CO2 26   *   BUN 15   CREATININE 0.4*   ALKPHOS 101   ALT 35   AST 95*   BILITOT 6.0*      Recent Labs   Lab 06/18/23  1620   INR 2.0*   APTT 35.6*      Recent Labs   Lab 06/13/23  1533   TROPONINI 0.029*       Electrolytes: Electrolytes replaced  Accuchecks: Q6H    Gtts:      LDA/Wounds:  Lines/Drains/Airways       Drain  Duration                  NG/OG Tube 06/01/23 2200 Right nostril 16 days         Rectal Tube 06/03/23 1300 15 days    Female External Urinary Catheter 06/16/23 1505 2 days               Arterial Line  Duration             Arterial Line 06/11/23 1300 Left Femoral 7 days              Peripheral Intravenous Line  Duration                  Midline Catheter Insertion/Assessment  - Single Lumen 05/25/23 1005 Right basilic vein (medial side of arm) other (see comments) 24 days         Peripheral IV - Single Lumen 06/15/23 1229 20 G;1 3/4 in Right Lower leg 3 days         Peripheral IV - Single Lumen 06/18/23 1421 20 G Anterior;Right Forearm <1 day                  Wounds: Yes  Wound care consulted: Yes

## 2023-06-18 NOTE — NURSING
[facility] Care Plan    POC reviewed with Ayala Spencer at 0300. Pt nodded in understanding but unable to communicate questions and concerns. No acute events overnight. Pt progressing toward goals. Will continue to monitor. See below and flowsheets for full assessment and VS info.   -Pt tolerated bipap on 30%  -difficulty inserting IO cath d/t patients increasing LE edema  -ascites complicating accurate bladder scans  -femoral A-line dressing saturated with blood; dressing changed; notable bleeding coming from insertion site; physician aware  -WBC and platelets continue to be critically low            Is this a stroke patient? no    Neuro:  Cheryl Coma Scale  Best Eye Response: 4-->(E4) spontaneous  Best Motor Response: 6-->(M6) obeys commands  Best Verbal Response: 2-->(V2) incomprehensible speech  Cheryl Coma Scale Score: 12  Assessment Qualifiers: patient not sedated/intubated  Pupil PERRLA: no     24hr Temp:  [97.3 °F (36.3 °C)-99.9 °F (37.7 °C)]     CV:   Rhythm: sinus tachycardia, normal sinus rhythm  BP goals:   SBP < 180  MAP > 65    Resp:      Vent Mode: Spont  Set Rate: 12 BPM  Oxygen Concentration (%): 30  Vt Set: 340 mL  PEEP/CPAP: 5 cmH20  Pressure Support: 10 cmH20    Plan:  wean bipap    GI/:     Diet/Nutrition Received: tube feeding  Last Bowel Movement: 06/17/23  Voiding Characteristics: external catheter    Intake/Output Summary (Last 24 hours) at 6/18/2023 0700  Last data filed at 6/18/2023 0301  Gross per 24 hour   Intake 1306.06 ml   Output 1627 ml   Net -320.94 ml     Unmeasured Output  Urine Occurrence: 1  Stool Occurrence: 1  Emesis Occurrence: 0  Pad Count: 1    Labs/Accuchecks:  Recent Labs   Lab 06/18/23  0336   WBC 1.37*   RBC 2.20*   HGB 6.7*   HCT 21.9*   PLT 41*      Recent Labs   Lab 06/18/23  0336   *   K 3.3*   CO2 26   *   BUN 15   CREATININE 0.4*   ALKPHOS 101   ALT 35   AST 95*   BILITOT 6.0*      Recent Labs   Lab 06/17/23  1714 06/17/23 2035   INR 1.9*  --    APTT   --  31.6      Recent Labs   Lab 06/13/23  1533   TROPONINI 0.029*       Electrolytes: Electrolytes replaced  Accuchecks: Q6H    Gtts:      LDA/Wounds:  Lines/Drains/Airways       Drain  Duration                  NG/OG Tube 06/01/23 2200 Right nostril 16 days         Rectal Tube 06/03/23 1300 14 days    Female External Urinary Catheter 06/16/23 1505 1 day              Arterial Line  Duration             Arterial Line 06/11/23 1300 Left Femoral 6 days              Peripheral Intravenous Line  Duration                  Midline Catheter Insertion/Assessment  - Single Lumen 05/25/23 1005 Right basilic vein (medial side of arm) other (see comments) 23 days         Peripheral IV - Single Lumen 06/15/23 1229 20 G;1 3/4 in Right Lower leg 2 days                  Wounds: Yes  Wound care consulted: Yes

## 2023-06-18 NOTE — ASSESSMENT & PLAN NOTE
Intubated today for airway protection  Difficult intubation due to far anterior airway  Extubated to bipap 6/16.  Weaning bipap to high flow NC as able

## 2023-06-18 NOTE — ASSESSMENT & PLAN NOTE
Likely secondary to hepatic cirrhosis and development of splenomegaly  Has worsened and associated with hgb drop, transfuse to >50K  No signs of bleeding in GI tract or on CT, hemolysis on labs  Hematology consulted, tested negative for HIT    6/11 --> acute hypotension, hypoxia --> hypovolemic shock --> Large R peritoneal hematoma --> IR for class A embo --> s/p IR embolization of left lumbar and internal iliac branch foci of arterial extravasation    Plts continue to trending down despite transfusion, however, no active bleeding. Will transfuse if plt < 25, or < 50 with active bleeding

## 2023-06-18 NOTE — ASSESSMENT & PLAN NOTE
58F with EtOH induced hepatic cirrhosis, seizure disorder on outpatient LEV and ZNS and bipolar disorder admitted on 5/28 for persistent encephalopathy.    - UTI on admit (Proteus mirabilis), now s/p CTX course  - Hypercalcemia 2/2 lithium toxicity (Lithium changed to Abilify per Psych)  - Hepatic encephalopathy 2/2 to medication non compliance, treated with lactulose and rifaximin     MRI Brain WO was unremarkable for acute neurologic change  EEG w/ frequent left hemispheric electrographic seizures and NCSE  Repeat EEGs without seizure activity x 24 hours, now resolved    6/11 --> acute hypotension, hypoxia --> hypovolemic shock --> Large R peritoneal hematoma --> IR for class A embo --> s/p IR embolization of left lumbar and internal iliac branch foci of arterial extravasation  6/13 --> episode of hypoxia and SOB --> CTA PE without evidence of PE, CT C/A/P without new bleed    - Continued admission to [facility]  - Q2h neurochecks while in ICU  - Q2h vitals while in ICU  - HOB@30  - Keppra 500mg BID  - Thiamine replacement   - Lactulose, titrate to BM  - MAP>65  - Daily CMP, Mag, Phos - replete electrolytes PRN  - Lipid panel, A1c, Coags reviewed  - Daily CBC, transfuse PRN and replace Plts <25  - BID fibrinogen/PT-INR  - Heparin held in setting of acute bleed, IVC filter in place  - PT/OT/SLP as appropriate  - CM/SW consult for dispo planning

## 2023-06-18 NOTE — NURSING
Discharge orders per physician. AVS printed and reviewed at bedside with patient. All follow up appointments and medications reviewed at this time. All patient teaching demonstrated and patient verbalized understanding with teachback method. All care plans and education resolved per adequate discharge. All peripheral IVs/LDAs removed at this time. Transport requested with wheelchair to bedside. Patient successfully discharged

## 2023-06-19 LAB
ALBUMIN SERPL BCP-MCNC: 2.6 G/DL (ref 3.5–5.2)
ALBUMIN SERPL BCP-MCNC: 2.6 G/DL (ref 3.5–5.2)
ALP SERPL-CCNC: 102 U/L (ref 55–135)
ALP SERPL-CCNC: 106 U/L (ref 55–135)
ALT SERPL W/O P-5'-P-CCNC: 35 U/L (ref 10–44)
ALT SERPL W/O P-5'-P-CCNC: 35 U/L (ref 10–44)
ANION GAP SERPL CALC-SCNC: 10 MMOL/L (ref 8–16)
ANISOCYTOSIS BLD QL SMEAR: ABNORMAL
ANISOCYTOSIS BLD QL SMEAR: SLIGHT
APTT PPP: 35 SEC (ref 21–32)
APTT PPP: 38.7 SEC (ref 21–32)
AST SERPL-CCNC: 83 U/L (ref 10–40)
AST SERPL-CCNC: 83 U/L (ref 10–40)
BASO STIPL BLD QL SMEAR: ABNORMAL
BASOPHILS # BLD AUTO: 0.01 K/UL (ref 0–0.2)
BASOPHILS # BLD AUTO: ABNORMAL K/UL (ref 0–0.2)
BASOPHILS NFR BLD: 0 % (ref 0–1.9)
BASOPHILS NFR BLD: 0.3 % (ref 0–1.9)
BILIRUB DIRECT SERPL-MCNC: 2.5 MG/DL (ref 0.1–0.3)
BILIRUB SERPL-MCNC: 7 MG/DL (ref 0.1–1)
BILIRUB SERPL-MCNC: 7.2 MG/DL (ref 0.1–1)
BLD PROD TYP BPU: NORMAL
BLOOD UNIT EXPIRATION DATE: NORMAL
BLOOD UNIT TYPE CODE: 5100
BLOOD UNIT TYPE: NORMAL
BUN SERPL-MCNC: 14 MG/DL (ref 6–20)
BURR CELLS BLD QL SMEAR: ABNORMAL
CA-I BLDV-SCNC: 1.14 MMOL/L (ref 1.06–1.42)
CALCIUM SERPL-MCNC: 8.2 MG/DL (ref 8.7–10.5)
CHLORIDE SERPL-SCNC: 116 MMOL/L (ref 95–110)
CO2 SERPL-SCNC: 25 MMOL/L (ref 23–29)
CODING SYSTEM: NORMAL
CREAT SERPL-MCNC: 0.4 MG/DL (ref 0.5–1.4)
CROSSMATCH INTERPRETATION: NORMAL
DACRYOCYTES BLD QL SMEAR: ABNORMAL
DIFFERENTIAL METHOD: ABNORMAL
DIFFERENTIAL METHOD: ABNORMAL
DISPENSE STATUS: NORMAL
DOHLE BOD BLD QL SMEAR: PRESENT
EOSINOPHIL # BLD AUTO: 0 K/UL (ref 0–0.5)
EOSINOPHIL # BLD AUTO: ABNORMAL K/UL (ref 0–0.5)
EOSINOPHIL NFR BLD: 1 % (ref 0–8)
EOSINOPHIL NFR BLD: 1 % (ref 0–8)
ERYTHROCYTE [DISTWIDTH] IN BLOOD BY AUTOMATED COUNT: 21.6 % (ref 11.5–14.5)
ERYTHROCYTE [DISTWIDTH] IN BLOOD BY AUTOMATED COUNT: 22.8 % (ref 11.5–14.5)
EST. GFR  (NO RACE VARIABLE): >60 ML/MIN/1.73 M^2
FIBRINOGEN PPP-MCNC: 123 MG/DL (ref 182–400)
FIBRINOGEN PPP-MCNC: 126 MG/DL (ref 182–400)
GLUCOSE SERPL-MCNC: 153 MG/DL (ref 70–110)
HCT VFR BLD AUTO: 21.2 % (ref 37–48.5)
HCT VFR BLD AUTO: 24.1 % (ref 37–48.5)
HGB BLD-MCNC: 6.6 G/DL (ref 12–16)
HGB BLD-MCNC: 7.6 G/DL (ref 12–16)
HYPOCHROMIA BLD QL SMEAR: ABNORMAL
HYPOCHROMIA BLD QL SMEAR: ABNORMAL
IMM GRANULOCYTES # BLD AUTO: 0.04 K/UL (ref 0–0.04)
IMM GRANULOCYTES # BLD AUTO: ABNORMAL K/UL (ref 0–0.04)
IMM GRANULOCYTES NFR BLD AUTO: 1.3 % (ref 0–0.5)
IMM GRANULOCYTES NFR BLD AUTO: ABNORMAL % (ref 0–0.5)
INR PPP: 1.8 (ref 0.8–1.2)
INR PPP: 1.8 (ref 0.8–1.2)
LYMPHOCYTES # BLD AUTO: 0.5 K/UL (ref 1–4.8)
LYMPHOCYTES # BLD AUTO: ABNORMAL K/UL (ref 1–4.8)
LYMPHOCYTES NFR BLD: 13 % (ref 18–48)
LYMPHOCYTES NFR BLD: 16.1 % (ref 18–48)
MAGNESIUM SERPL-MCNC: 1.9 MG/DL (ref 1.6–2.6)
MCH RBC QN AUTO: 29 PG (ref 27–31)
MCH RBC QN AUTO: 30.1 PG (ref 27–31)
MCHC RBC AUTO-ENTMCNC: 31.1 G/DL (ref 32–36)
MCHC RBC AUTO-ENTMCNC: 31.5 G/DL (ref 32–36)
MCV RBC AUTO: 92 FL (ref 82–98)
MCV RBC AUTO: 97 FL (ref 82–98)
MONOCYTES # BLD AUTO: 0.3 K/UL (ref 0.3–1)
MONOCYTES # BLD AUTO: ABNORMAL K/UL (ref 0.3–1)
MONOCYTES NFR BLD: 6 % (ref 4–15)
MONOCYTES NFR BLD: 9.9 % (ref 4–15)
NEUTROPHILS # BLD AUTO: 2.2 K/UL (ref 1.8–7.7)
NEUTROPHILS NFR BLD: 71.4 % (ref 38–73)
NEUTROPHILS NFR BLD: 75 % (ref 38–73)
NEUTS BAND NFR BLD MANUAL: 5 %
NRBC BLD-RTO: 0 /100 WBC
NRBC BLD-RTO: 0 /100 WBC
OVALOCYTES BLD QL SMEAR: ABNORMAL
OVALOCYTES BLD QL SMEAR: ABNORMAL
PHOSPHATE SERPL-MCNC: 1.7 MG/DL (ref 2.7–4.5)
PLATELET # BLD AUTO: 38 K/UL (ref 150–450)
PLATELET # BLD AUTO: 41 K/UL (ref 150–450)
PLATELET BLD QL SMEAR: ABNORMAL
PLATELET BLD QL SMEAR: ABNORMAL
PMV BLD AUTO: 10 FL (ref 9.2–12.9)
PMV BLD AUTO: 10.4 FL (ref 9.2–12.9)
POCT GLUCOSE: 133 MG/DL (ref 70–110)
POCT GLUCOSE: 140 MG/DL (ref 70–110)
POCT GLUCOSE: 161 MG/DL (ref 70–110)
POIKILOCYTOSIS BLD QL SMEAR: SLIGHT
POIKILOCYTOSIS BLD QL SMEAR: SLIGHT
POLYCHROMASIA BLD QL SMEAR: ABNORMAL
POLYCHROMASIA BLD QL SMEAR: ABNORMAL
POTASSIUM SERPL-SCNC: 3.2 MMOL/L (ref 3.5–5.1)
PROT SERPL-MCNC: 4.6 G/DL (ref 6–8.4)
PROT SERPL-MCNC: 4.7 G/DL (ref 6–8.4)
PROTHROMBIN TIME: 18.8 SEC (ref 9–12.5)
PROTHROMBIN TIME: 19 SEC (ref 9–12.5)
RBC # BLD AUTO: 2.19 M/UL (ref 4–5.4)
RBC # BLD AUTO: 2.62 M/UL (ref 4–5.4)
SCHISTOCYTES BLD QL SMEAR: ABNORMAL
SODIUM SERPL-SCNC: 151 MMOL/L (ref 136–145)
TOXIC GRANULES BLD QL SMEAR: PRESENT
TRANS ERYTHROCYTES VOL PATIENT: NORMAL ML
WBC # BLD AUTO: 2.36 K/UL (ref 3.9–12.7)
WBC # BLD AUTO: 3.04 K/UL (ref 3.9–12.7)

## 2023-06-19 PROCEDURE — 85025 COMPLETE CBC W/AUTO DIFF WBC: CPT

## 2023-06-19 PROCEDURE — 85027 COMPLETE CBC AUTOMATED: CPT | Performed by: PSYCHIATRY & NEUROLOGY

## 2023-06-19 PROCEDURE — 25000242 PHARM REV CODE 250 ALT 637 W/ HCPCS

## 2023-06-19 PROCEDURE — 25000003 PHARM REV CODE 250: Performed by: STUDENT IN AN ORGANIZED HEALTH CARE EDUCATION/TRAINING PROGRAM

## 2023-06-19 PROCEDURE — 94640 AIRWAY INHALATION TREATMENT: CPT

## 2023-06-19 PROCEDURE — 99233 SBSQ HOSP IP/OBS HIGH 50: CPT | Mod: ,,, | Performed by: PSYCHIATRY & NEUROLOGY

## 2023-06-19 PROCEDURE — 27000221 HC OXYGEN, UP TO 24 HOURS

## 2023-06-19 PROCEDURE — 25000003 PHARM REV CODE 250: Performed by: PSYCHIATRY & NEUROLOGY

## 2023-06-19 PROCEDURE — 83735 ASSAY OF MAGNESIUM: CPT

## 2023-06-19 PROCEDURE — 94761 N-INVAS EAR/PLS OXIMETRY MLT: CPT

## 2023-06-19 PROCEDURE — P9021 RED BLOOD CELLS UNIT: HCPCS

## 2023-06-19 PROCEDURE — 80076 HEPATIC FUNCTION PANEL: CPT | Performed by: NURSE PRACTITIONER

## 2023-06-19 PROCEDURE — 99900035 HC TECH TIME PER 15 MIN (STAT)

## 2023-06-19 PROCEDURE — 25000003 PHARM REV CODE 250

## 2023-06-19 PROCEDURE — 85007 BL SMEAR W/DIFF WBC COUNT: CPT | Performed by: PSYCHIATRY & NEUROLOGY

## 2023-06-19 PROCEDURE — 82330 ASSAY OF CALCIUM: CPT

## 2023-06-19 PROCEDURE — 84100 ASSAY OF PHOSPHORUS: CPT

## 2023-06-19 PROCEDURE — 20000000 HC ICU ROOM

## 2023-06-19 PROCEDURE — 80053 COMPREHEN METABOLIC PANEL: CPT

## 2023-06-19 PROCEDURE — 25000242 PHARM REV CODE 250 ALT 637 W/ HCPCS: Performed by: PHYSICIAN ASSISTANT

## 2023-06-19 PROCEDURE — 85384 FIBRINOGEN ACTIVITY: CPT | Mod: 91 | Performed by: PSYCHIATRY & NEUROLOGY

## 2023-06-19 PROCEDURE — 85730 THROMBOPLASTIN TIME PARTIAL: CPT | Mod: 91 | Performed by: PSYCHIATRY & NEUROLOGY

## 2023-06-19 PROCEDURE — C9113 INJ PANTOPRAZOLE SODIUM, VIA: HCPCS | Performed by: STUDENT IN AN ORGANIZED HEALTH CARE EDUCATION/TRAINING PROGRAM

## 2023-06-19 PROCEDURE — 63600175 PHARM REV CODE 636 W HCPCS: Performed by: STUDENT IN AN ORGANIZED HEALTH CARE EDUCATION/TRAINING PROGRAM

## 2023-06-19 PROCEDURE — 85610 PROTHROMBIN TIME: CPT | Performed by: PSYCHIATRY & NEUROLOGY

## 2023-06-19 PROCEDURE — 63600175 PHARM REV CODE 636 W HCPCS: Performed by: PSYCHIATRY & NEUROLOGY

## 2023-06-19 PROCEDURE — 97535 SELF CARE MNGMENT TRAINING: CPT

## 2023-06-19 PROCEDURE — 25000242 PHARM REV CODE 250 ALT 637 W/ HCPCS: Performed by: PSYCHIATRY & NEUROLOGY

## 2023-06-19 PROCEDURE — 63600175 PHARM REV CODE 636 W HCPCS

## 2023-06-19 PROCEDURE — 92610 EVALUATE SWALLOWING FUNCTION: CPT

## 2023-06-19 PROCEDURE — 99233 PR SUBSEQUENT HOSPITAL CARE,LEVL III: ICD-10-PCS | Mod: ,,, | Performed by: PSYCHIATRY & NEUROLOGY

## 2023-06-19 RX ORDER — SILODOSIN 4 MG/1
4 CAPSULE ORAL DAILY
Status: DISCONTINUED | OUTPATIENT
Start: 2023-06-20 | End: 2023-06-22

## 2023-06-19 RX ORDER — HYDROCODONE BITARTRATE AND ACETAMINOPHEN 500; 5 MG/1; MG/1
TABLET ORAL
Status: DISCONTINUED | OUTPATIENT
Start: 2023-06-19 | End: 2023-06-30

## 2023-06-19 RX ORDER — ARIPIPRAZOLE 5 MG/1
5 TABLET ORAL DAILY
Status: DISCONTINUED | OUTPATIENT
Start: 2023-06-20 | End: 2023-06-22

## 2023-06-19 RX ORDER — ACETYLCYSTEINE 100 MG/ML
4 SOLUTION ORAL; RESPIRATORY (INHALATION) EVERY 8 HOURS
Status: DISCONTINUED | OUTPATIENT
Start: 2023-06-19 | End: 2023-06-19

## 2023-06-19 RX ORDER — IPRATROPIUM BROMIDE AND ALBUTEROL SULFATE 2.5; .5 MG/3ML; MG/3ML
3 SOLUTION RESPIRATORY (INHALATION) EVERY 8 HOURS
Status: DISCONTINUED | OUTPATIENT
Start: 2023-06-19 | End: 2023-06-19

## 2023-06-19 RX ORDER — ACETYLCYSTEINE 100 MG/ML
4 SOLUTION ORAL; RESPIRATORY (INHALATION) EVERY 8 HOURS
Status: DISCONTINUED | OUTPATIENT
Start: 2023-06-19 | End: 2023-06-22

## 2023-06-19 RX ORDER — IPRATROPIUM BROMIDE AND ALBUTEROL SULFATE 2.5; .5 MG/3ML; MG/3ML
3 SOLUTION RESPIRATORY (INHALATION) EVERY 8 HOURS
Status: DISCONTINUED | OUTPATIENT
Start: 2023-06-19 | End: 2023-06-22

## 2023-06-19 RX ORDER — POTASSIUM CHLORIDE 7.45 MG/ML
60 INJECTION INTRAVENOUS ONCE
Status: COMPLETED | OUTPATIENT
Start: 2023-06-19 | End: 2023-06-19

## 2023-06-19 RX ADMIN — ARIPIPRAZOLE 5 MG: 5 TABLET ORAL at 09:06

## 2023-06-19 RX ADMIN — LEVETIRACETAM 500 MG: 100 INJECTION, SOLUTION INTRAVENOUS at 08:06

## 2023-06-19 RX ADMIN — ACETYLCYSTEINE 4 ML: 100 INHALANT RESPIRATORY (INHALATION) at 07:06

## 2023-06-19 RX ADMIN — LEVETIRACETAM 500 MG: 100 INJECTION, SOLUTION INTRAVENOUS at 09:06

## 2023-06-19 RX ADMIN — PIPERACILLIN SODIUM AND TAZOBACTAM SODIUM 4.5 G: 4; .5 INJECTION, POWDER, LYOPHILIZED, FOR SOLUTION INTRAVENOUS at 06:06

## 2023-06-19 RX ADMIN — IPRATROPIUM BROMIDE AND ALBUTEROL SULFATE 3 ML: 2.5; .5 SOLUTION RESPIRATORY (INHALATION) at 04:06

## 2023-06-19 RX ADMIN — PIPERACILLIN SODIUM AND TAZOBACTAM SODIUM 4.5 G: 4; .5 INJECTION, POWDER, LYOPHILIZED, FOR SOLUTION INTRAVENOUS at 10:06

## 2023-06-19 RX ADMIN — MELATONIN TAB 3 MG 6 MG: 3 TAB at 08:06

## 2023-06-19 RX ADMIN — PANTOPRAZOLE SODIUM 40 MG: 40 INJECTION, POWDER, FOR SOLUTION INTRAVENOUS at 08:06

## 2023-06-19 RX ADMIN — IPRATROPIUM BROMIDE AND ALBUTEROL SULFATE 3 ML: .5; 3 SOLUTION RESPIRATORY (INHALATION) at 01:06

## 2023-06-19 RX ADMIN — IPRATROPIUM BROMIDE AND ALBUTEROL SULFATE 3 ML: 2.5; .5 SOLUTION RESPIRATORY (INHALATION) at 07:06

## 2023-06-19 RX ADMIN — RIFAXIMIN 550 MG: 550 TABLET ORAL at 08:06

## 2023-06-19 RX ADMIN — VANCOMYCIN HYDROCHLORIDE 1000 MG: 1 INJECTION, POWDER, LYOPHILIZED, FOR SOLUTION INTRAVENOUS at 10:06

## 2023-06-19 RX ADMIN — VANCOMYCIN HYDROCHLORIDE 1000 MG: 1 INJECTION, POWDER, LYOPHILIZED, FOR SOLUTION INTRAVENOUS at 12:06

## 2023-06-19 RX ADMIN — Medication 100 MG: at 09:06

## 2023-06-19 RX ADMIN — POTASSIUM CHLORIDE 10 MEQ: 7.46 INJECTION, SOLUTION INTRAVENOUS at 06:06

## 2023-06-19 RX ADMIN — ACETYLCYSTEINE 4 ML: 100 INHALANT RESPIRATORY (INHALATION) at 01:06

## 2023-06-19 RX ADMIN — OLANZAPINE 5 MG: 2.5 TABLET, FILM COATED ORAL at 08:06

## 2023-06-19 RX ADMIN — RIFAXIMIN 550 MG: 550 TABLET ORAL at 10:06

## 2023-06-19 RX ADMIN — IPRATROPIUM BROMIDE AND ALBUTEROL SULFATE 3 ML: 2.5; .5 SOLUTION RESPIRATORY (INHALATION) at 12:06

## 2023-06-19 RX ADMIN — PIPERACILLIN SODIUM AND TAZOBACTAM SODIUM 4.5 G: 4; .5 INJECTION, POWDER, LYOPHILIZED, FOR SOLUTION INTRAVENOUS at 02:06

## 2023-06-19 RX ADMIN — INSULIN ASPART 1 UNITS: 100 INJECTION, SOLUTION INTRAVENOUS; SUBCUTANEOUS at 01:06

## 2023-06-19 RX ADMIN — ACETYLCYSTEINE 4 ML: 100 INHALANT RESPIRATORY (INHALATION) at 12:06

## 2023-06-19 RX ADMIN — SILODOSIN 4 MG: 4 CAPSULE ORAL at 09:06

## 2023-06-19 RX ADMIN — FOLIC ACID 1 MG: 1 TABLET ORAL at 09:06

## 2023-06-19 RX ADMIN — SODIUM PHOSPHATE, MONOBASIC, MONOHYDRATE AND SODIUM PHOSPHATE, DIBASIC, ANHYDROUS 20.01 MMOL: 142; 276 INJECTION, SOLUTION INTRAVENOUS at 08:06

## 2023-06-19 RX ADMIN — OLANZAPINE 5 MG: 2.5 TABLET, FILM COATED ORAL at 09:06

## 2023-06-19 RX ADMIN — PANTOPRAZOLE SODIUM 40 MG: 40 INJECTION, POWDER, FOR SOLUTION INTRAVENOUS at 09:06

## 2023-06-19 NOTE — PROGRESS NOTES
[facility] - Neuro Critical Care  Neurocritical Care  Progress Note    Admit Date: 5/28/2023  Service Date: 06/19/2023  Length of Stay: 22    Subjective:     Chief Complaint: Non-convulsive status epilepticus    History of Present Illness: Marely Hamilton is a 58 year old female with a medical history significant for EtOH induced hepatic cirrhosis, seizure disorder on outpatient LEV and ZNS and bipolar disorder who was admitted to [facility] on 5/28 as a transfer from OSH for evaluation of persistent encephalopathy concerning for NCSE vs hepatic encephalopathy. History is obtained from chart as patient is unresponsive and unable to assist. Initially presented to [facility] on 5/18 for encephalopathy and thought to be multifactorial including UTI (Proteus mirabilis s/p CTX course), hypercalcemia 2/2 lithium toxicity (Lithium changed to Abilify per Psych), as well as hepatic encephalopathy secondary to medication non compliance. She was treated with lactulose and rifaximin with no improvement in her mental status. EEG showed generalized slowing as well as triphasic discharges in the left hemisphere. MRI Brain WO was unremarkable for acute neurologic change. Decision was made to transfer patient to [facility] for higher level of care and ongoing evaluation and management. On arrival, mental status exam has waxed and waned with patient being intermittently verbal and following commands. NH4 48.    NCC is consulted on hospital day 4 for admission after EEG showed frequent left hemispheric electrographic seizures lasting on average 10-30 seconds with prolonged seizures over 1.5 hours also noted. Per chart review, patient home dose of LEV increased from 1000mg to 1500mg BID, ZNS increased to 200mg. Vimpat 150mg BID added per General Neurology (had not been given prior to consult). On initial evaluation, patient is not responsive to voice or pain. Upward gaze noted. Decision made to transfer patient to [facility] for higher level of care and  airway watch.       Hospital Course: 06/02/2023 Tachycardic and hypotensive, transferred for development of mostly right hemispheric status, LOC exam remains poor  Intubated for airway protection, EEG has changed to mostly include triphasics with no definite seizure activity per Dr Boss, propofol stopped and AEDs simplified to keppra 1000mg bid only, wean off pressor, give colloids with crystalloid resuscitation  06/03/2023: remains on persistant significant pressor support despite adequate hydration, problems with third spacing and may have pulmonary hypertension as well, consider trial of stress steroids if hgb stabilizes  06/04/2023: levo down to 0.08mcg, start and advance TFs, vaso at 0.04U, ammonia has been stable, slight rise in tbili, check direct bili, hgb and plts improved, no clear source of bleeding, no source of bleeding on CT abdomen, consider superimposed hemolysis  06/05/2023 NAEON. Neurologic exam continues to improve, following commands in all extremities. Levo 0.02, weaning as able. Vaso discontinued, MAP>65. Daily SBT, monitor and hold TF at midnight for possible extubation tomorrow. Hemolysis work up ongoing, trending CBC. Continue CTX for SBP prophylaxis.   06/06/2023 Awake and following commands. SBT with low tidal volumes. Remains intubated for airway protection at this time with possible extubation tomorrow with continued neurologic improvement. Levo discontinued, maintaining SBP<65. Concern for hemolytic anemia related to autoimmune process vs hepatic dysfunction (elevated reticulocyte count and decreased haptoglobin).  06/07/2023 Rested on AC overnight due to low TV and fatigue. SBT with pressure support 10/5 this am, weaning ventilator as tolerated. Continue tube feeds with goal to optimize nutrition. Ammonia elevated, continue lactulose to encourage bowel movement.   06/08/2023 Failed SBT due to apnea. Some breaths on SIMV. Awake and following commands. Ammonia trending down (44) with  BMs, continue lactulose. Advance nutritional support with goal to increase strength towards extubation. Plt transfusions PRN. Cryo for fibrinogen <100.  06/09/2023 Venous US with R brachial and femoral DVT. Dicussed with Hematology with recommendation to start Argatroban with plts>50. Daily SBT with apnea, maintain on SIMV with backup rate of 10. Hyperammonemia resolved.   06/10/2023 patient is more alert and awake, tolerated SBT. Hematology agreed to switch to heparin for DVT given negative for HIT  06/11/2023   On AM rounds, patient was noted to be hypoxic and tachycardic. Hypoxia resolved with ventilator changes but patient continued to appear uncomfortable. Patient lied flat with noted improvement in oxygenation. Patient became acutely hypotensive, tachycardiac and hypoxic not responsive to ventilator changes. IVF bolus + Burton bump x3 + initiation of vasopressin due to concern for hypovolemic shock. Levophed added due to persistent hypertension. L femoral arterial line and R IJ trialysis placed emergently. STAT Hemoglobin 4.4. Received protamine for heparin reversal, 3u Plts and 3 FFP and 2 pRBC. STAT CTA abdo/pelvis with L retroperitoneal hematoma with + spot sign. IR consulted and patient taken class A for diagnostic angiogram with possible emobolization. Pressors weaned after volume resuscitation. Family updated over phone.   06/12/2023 s/p IR embolization of left lumbar and internal iliac branch foci of arterial extravasation. Hemoglobin stable, continue to trend CBC q8 with transfusion of pRBC for Hg<7. Restart trickle feeds. Albumin and lasix for dieresis. Low threshold for antibiotics given risk of SBP and RP hematoma. Unlikely to tolerate AC, will discuss placement of IVC filter with IR.   06/13/2023 Attempted to wean FiO2 overnight with noted drop of pO2. FiO2 increased to 55% and patient received albumin and was diuresed. Weaning this am with ABGs. Remains on fentanyl 12.5, neurologic exam unchanged. Hg  stable. Post AM rounds, patient with acute increase in O2 needs, STAT CTA chest/abd/pelvis to assess for PE vs further hemorrhage stable and without new findings. Continue current management.   06/14/2023 NAEON. Hg stable at 7.6. Plt 44, transfuse for <50. Pending IVC filter per IR for DVT burden with contraindications to AC.  06/15/2023 s/p IVC filter placement. Daily SBT, complicated by anxiety. Remains on spontaneous mode, 10/5, 40% FiO2 with goal to wean as able.   06/16/2023 Rested on AC overnight. Plts low, received 1 pack overnight. Daily SBT with plan to extubate as able.   06/17/2023 extubated on BiPap yesterday, weaning off FiO2. Plts trended down again, received transfusion overnight, no sign of active bleeding.   06/18/2023 Hg 6.7, receiving 1u pRBC. Plan to wean Bipap to high flow NC.  06/19/2023: down to 4L with sats at 100%, still requiring occasional transfusion, otherwise awake and able to interact appropriately with examiner      Interval History:     Review of Systems   Unable to perform ROS: Mental status change     Objective:     Vitals:  Temp: 97.4 °F (36.3 °C)  Pulse: 86  Rhythm: normal sinus rhythm, sinus tachycardia  BP: 132/63  MAP (mmHg): 90  Resp: 16  SpO2: 100 %    Temp  Min: 97.2 °F (36.2 °C)  Max: 98.8 °F (37.1 °C)  Pulse  Min: 80  Max: 119  BP  Min: 127/70  Max: 195/102  MAP (mmHg)  Min: 78  Max: 139  Resp  Min: 11  Max: 22  SpO2  Min: 90 %  Max: 100 %    06/18 0701 - 06/19 0700  In: 2302.3 [I.V.:50]  Out: 1360 [Urine:1050]   Unmeasured Output  Urine Occurrence: 1  Stool Occurrence: 1  Emesis Occurrence: 0  Pad Count: 1        Physical Exam  HENT:      Head: Normocephalic.   Eyes:      Extraocular Movements: Extraocular movements intact.      Pupils: Pupils are equal, round, and reactive to light.   Cardiovascular:      Rate and Rhythm: Normal rate.   Pulmonary:      Breath sounds: Normal breath sounds.   Abdominal:      Palpations: Abdomen is soft.   Musculoskeletal:      Cervical  back: Neck supple.   Skin:     Findings: Bruising present.      Comments: Right arm swollen likely from infiltration of midline   Neurological:      General: No focal deficit present.      Mental Status: She is alert. She is disoriented.   Psychiatric:      Comments: anxious            Medications:  Continuous Scheduledacetylcysteine 100 mg/ml (10%), 4 mL, Q8H  albuterol-ipratropium, 3 mL, Q8H  ARIPiprazole, 5 mg, Daily  levETIRAcetam (Keppra) IV (PEDS and ADULTS), 500 mg, Q12H  melatonin, 6 mg, Nightly  OLANZapine, 5 mg, BID  pantoprazole, 40 mg, BID  piperacillin-tazobactam (Zosyn) IV (PEDS and ADULTS) (extended infusion is not appropriate), 4.5 g, Q8H  rifAXIMin, 550 mg, BID  silodosin, 4 mg, Daily  vancomycin (VANCOCIN) IVPB, 15 mg/kg, Q12H    PRNsodium chloride, , Q24H PRN  aluminum-magnesium hydroxide-simethicone, 30 mL, QID PRN  dextrose 10%, 12.5 g, PRN  dextrose 10%, 25 g, PRN  dextrose, 15 g, PRN  dextrose, 30 g, PRN  glucagon (human recombinant), 1 mg, PRN  insulin aspart U-100, 1-10 Units, Q6H PRN  labetalol, 10 mg, Q4H PRN  magnesium oxide, 800 mg, PRN  magnesium oxide, 800 mg, PRN  naloxone, 0.02 mg, PRN  ondansetron, 4 mg, Q8H PRN  potassium bicarbonate, 35 mEq, PRN  potassium bicarbonate, 50 mEq, PRN  potassium bicarbonate, 60 mEq, PRN  potassium, sodium phosphates, 2 packet, PRN  potassium, sodium phosphates, 2 packet, PRN  potassium, sodium phosphates, 2 packet, PRN  racepinephrine, 0.5 mL, Q4H PRN  simethicone, 1 tablet, QID PRN  sodium chloride 0.9%, 10 mL, Q8H PRN  vancomycin - pharmacy to dose, , pharmacy to manage frequency      Today I personally reviewed pertinent medications, lines/drains/airways, laboratory results, notably:    Diet  Diet NPO  Diet NPO          Assessment/Plan:     Neuro  * Non-convulsive status epilepticus  58F with EtOH induced hepatic cirrhosis, seizure disorder on outpatient LEV and ZNS and bipolar disorder admitted on 5/28 for persistent encephalopathy.    - UTI on  admit (Proteus mirabilis), now s/p CTX course  - Hypercalcemia 2/2 lithium toxicity (Lithium changed to Abilify per Psych)  - Hepatic encephalopathy 2/2 to medication non compliance, treated with lactulose and rifaximin     MRI Brain WO was unremarkable for acute neurologic change  EEG w/ frequent left hemispheric electrographic seizures and NCSE  Repeat EEGs without seizure activity x 24 hours, now resolved    6/11 --> acute hypotension, hypoxia --> hypovolemic shock --> Large R peritoneal hematoma --> IR for class A embo --> s/p IR embolization of left lumbar and internal iliac branch foci of arterial extravasation  6/13 --> episode of hypoxia and SOB --> CTA PE without evidence of PE, CT C/A/P without new bleed    - Continued admission to [facility]  - Q2h neurochecks while in ICU  - Q2h vitals while in ICU  - HOB@30  - Keppra 500mg BID  - Thiamine replacement   - Lactulose, titrate to BM  - MAP>65  - Daily CMP, Mag, Phos - replete electrolytes PRN  - Lipid panel, A1c, Coags reviewed  - Daily CBC, transfuse PRN and replace Plts <25  - BID fibrinogen/PT-INR  - Heparin held in setting of acute bleed, IVC filter in place  - PT/OT/SLP as appropriate  - CM/SW consult for dispo planning    Hematology  Deep vein thrombosis (DVT) of femoral vein of right lower extremity  Noted on 6/8  Argatroban per Heme recs - transitioned to heparin  HELD 6/11 in setting of hypovolemic shock 2/2 retroperitoneal hemorrhage   IVC filter placed 6/14    Thrombocytopenia  Likely secondary to hepatic cirrhosis and development of splenomegaly  Has worsened and associated with hgb drop, transfuse to >50K  No signs of bleeding in GI tract or on CT, hemolysis on labs  Hematology consulted, tested negative for HIT    6/11 --> acute hypotension, hypoxia --> hypovolemic shock --> Large R peritoneal hematoma --> IR for class A embo --> s/p IR embolization of left lumbar and internal iliac branch foci of arterial extravasation    Plts continue to trending  down despite transfusion, however, no active bleeding. Will transfuse if plt < 25, or < 50 with active bleeding    GI  Acute liver failure without hepatic coma  GI/Hepatology    Hepatic encephalopathy  Continue lactulose   See Non-convulsive status epilepticus    Other  Retroperitoneal bleed  6/11 --> acute hypotension, hypoxia --> hypovolemic shock --> Large R peritoneal hematoma --> IR for class A embo --> s/p IR embolization of left lumbar and internal iliac branch foci of arterial extravasation  Trend Hg, repeat CT c/a/p stable          The patient is being Prophylaxed for:  Venous Thromboembolism with: Mechanical  Stress Ulcer with: PPI  Ventilator Pneumonia with: not applicable    Activity Orders          Discontinue arterial line starting at 06/18 1927    Diet NPO: NPO starting at 06/15 0500    Elevate HOB flat until bedrest complete starting at 06/11 1641    Turn patient starting at 05/28 2253    Progressive Mobility Protocol (mobilize patient to their highest level of functioning at least twice daily) starting at 05/28 2000      should be stable for transfer to floor in am  Full Code    Rafi Veronica MD  Neurocritical Care  [facility] - Neuro Critical Care

## 2023-06-19 NOTE — PLAN OF CARE
Recommend that pt be npo except for meds crushed in puree bolus  Problem: SLP  Goal: SLP Goal  Description: Goals due 6/26  1.  Pt. Will participate in ongoing assessment of swallow to initiate least restrictive diet    Outcome: Ongoing, Progressing

## 2023-06-19 NOTE — ASSESSMENT & PLAN NOTE
58F with EtOH induced hepatic cirrhosis, seizure disorder on outpatient LEV and ZNS and bipolar disorder admitted on 5/28 for persistent encephalopathy.    - UTI on admit (Proteus mirabilis), now s/p CTX course  - Hypercalcemia 2/2 lithium toxicity (Lithium changed to Abilify per Psych)  - Hepatic encephalopathy 2/2 to medication non compliance, treated with lactulose and rifaximin     MRI Brain WO was unremarkable for acute neurologic change  EEG w/ frequent left hemispheric electrographic seizures and NCSE  Repeat EEGs without seizure activity x 24 hours, now resolved    6/11 --> acute hypotension, hypoxia --> hypovolemic shock --> Large R peritoneal hematoma --> IR for class A embo --> s/p IR embolization of left lumbar and internal iliac branch foci of arterial extravasation  6/13 --> episode of hypoxia and SOB --> CTA PE without evidence of PE, CT C/A/P without new bleed    - Continued admission to [facility]  - Q2h neurochecks while in ICU  - Q2h vitals while in ICU  - HOB@30  - Keppra 500mg BID  - Thiamine replacement   - Lactulose, titrate to BM  - MAP>65  - Daily CMP, Mag, Phos - replete electrolytes PRN  - Lipid panel, A1c, Coags reviewed  - Daily CBC, transfuse PRN and replace Plts <25  - BID fibrinogen/PT-INR  - Heparin held in setting of acute bleed, IVC filter in place  - PT/OT/SLP as appropriate  - CM/SW consult for dispo planning

## 2023-06-19 NOTE — PLAN OF CARE
[facility] - Neuro Critical Care  Discharge Reassessment    Primary Care Provider: Viktor Ross MD    Expected Discharge Date: 6/21/2023    Pt extubated 6/16. Per MD team, still pending medical and respiratory stablity. Pending therapy eval and recs. DC plan: TBD    Reassessment (most recent)       Discharge Reassessment - 06/19/23 1205          Discharge Reassessment    Assessment Type Discharge Planning Reassessment (P)      Did the patient's condition or plan change since previous assessment? No (P)      Discharge Plan discussed with: Sibling (P)      Name(s) and Number(s) Zaria (sister) [phone] (P)      Communicated BRAYAN with patient/caregiver Date not available/Unable to determine (P)      Discharge Plan A Skilled Nursing Facility (P)      Discharge Plan B Rehab (P)      DME Needed Upon Discharge  none (P)      Transition of Care Barriers None (P)         Post-Acute Status    Discharge Delays None known at this time (P)                    Charu Hay LCSW  Neurocritical Care   [facility]  [id number]

## 2023-06-19 NOTE — SUBJECTIVE & OBJECTIVE
Interval History:     Review of Systems   Unable to perform ROS: Mental status change     Objective:     Vitals:  Temp: 97.4 °F (36.3 °C)  Pulse: 86  Rhythm: normal sinus rhythm, sinus tachycardia  BP: 132/63  MAP (mmHg): 90  Resp: 16  SpO2: 100 %    Temp  Min: 97.2 °F (36.2 °C)  Max: 98.8 °F (37.1 °C)  Pulse  Min: 80  Max: 119  BP  Min: 127/70  Max: 195/102  MAP (mmHg)  Min: 78  Max: 139  Resp  Min: 11  Max: 22  SpO2  Min: 90 %  Max: 100 %    06/18 0701 - 06/19 0700  In: 2302.3 [I.V.:50]  Out: 1360 [Urine:1050]   Unmeasured Output  Urine Occurrence: 1  Stool Occurrence: 1  Emesis Occurrence: 0  Pad Count: 1        Physical Exam  HENT:      Head: Normocephalic.   Eyes:      Extraocular Movements: Extraocular movements intact.      Pupils: Pupils are equal, round, and reactive to light.   Cardiovascular:      Rate and Rhythm: Normal rate.   Pulmonary:      Breath sounds: Normal breath sounds.   Abdominal:      Palpations: Abdomen is soft.   Musculoskeletal:      Cervical back: Neck supple.   Skin:     Findings: Bruising present.      Comments: Right arm swollen likely from infiltration of midline   Neurological:      General: No focal deficit present.      Mental Status: She is alert. She is disoriented.   Psychiatric:      Comments: anxious            Medications:  Continuous Scheduledacetylcysteine 100 mg/ml (10%), 4 mL, Q8H  albuterol-ipratropium, 3 mL, Q8H  ARIPiprazole, 5 mg, Daily  levETIRAcetam (Keppra) IV (PEDS and ADULTS), 500 mg, Q12H  melatonin, 6 mg, Nightly  OLANZapine, 5 mg, BID  pantoprazole, 40 mg, BID  piperacillin-tazobactam (Zosyn) IV (PEDS and ADULTS) (extended infusion is not appropriate), 4.5 g, Q8H  rifAXIMin, 550 mg, BID  silodosin, 4 mg, Daily  vancomycin (VANCOCIN) IVPB, 15 mg/kg, Q12H    PRNsodium chloride, , Q24H PRN  aluminum-magnesium hydroxide-simethicone, 30 mL, QID PRN  dextrose 10%, 12.5 g, PRN  dextrose 10%, 25 g, PRN  dextrose, 15 g, PRN  dextrose, 30 g, PRN  glucagon (human  recombinant), 1 mg, PRN  insulin aspart U-100, 1-10 Units, Q6H PRN  labetalol, 10 mg, Q4H PRN  magnesium oxide, 800 mg, PRN  magnesium oxide, 800 mg, PRN  naloxone, 0.02 mg, PRN  ondansetron, 4 mg, Q8H PRN  potassium bicarbonate, 35 mEq, PRN  potassium bicarbonate, 50 mEq, PRN  potassium bicarbonate, 60 mEq, PRN  potassium, sodium phosphates, 2 packet, PRN  potassium, sodium phosphates, 2 packet, PRN  potassium, sodium phosphates, 2 packet, PRN  racepinephrine, 0.5 mL, Q4H PRN  simethicone, 1 tablet, QID PRN  sodium chloride 0.9%, 10 mL, Q8H PRN  vancomycin - pharmacy to dose, , pharmacy to manage frequency      Today I personally reviewed pertinent medications, lines/drains/airways, laboratory results, notably:    Diet  Diet NPO  Diet NPO

## 2023-06-19 NOTE — PLAN OF CARE
[facility] Care Plan    POC reviewed with Marely Hamilton and family at 0300. Pt verbalized understanding. Questions and concerns addressed. See below and flowsheets for full assessment and VS info.   -1u prbc given   -A line dc'd  -bleeding noted in R midline post-blood transfusion; provider notified and asked to keep midline in until further notice  -pt removed NG; NPO; unable to give PO meds; speech consult needed        Is this a stroke patient? no    Neuro:  [place] Coma Scale  Best Eye Response: 4-->(E4) spontaneous  Best Motor Response: 6-->(M6) obeys commands  Best Verbal Response: 4-->(V4) confused  [place] Coma Scale Score: 14  Assessment Qualifiers: patient not sedated/intubated  Pupil PERRLA: no     24hr Temp:  [97.8 °F (36.6 °C)-98.9 °F (37.2 °C)]     CV:   Rhythm: sinus tachycardia  BP goals:   SBP < 180  MAP > 65    Resp:      Vent Mode: Spont  Set Rate: 12 BPM  Oxygen Concentration (%): 30  Vt Set: 340 mL  PEEP/CPAP: 5 cmH20  Pressure Support: 10 cmH20    Plan: N/A    GI/:     Diet/Nutrition Received: ice chips  Last Bowel Movement: 06/18/23  Voiding Characteristics: unable to void, external catheter    Intake/Output Summary (Last 24 hours) at 6/19/2023 0503  Last data filed at 6/19/2023 0349  Gross per 24 hour   Intake 1952.75 ml   Output 1360 ml   Net 592.75 ml     Unmeasured Output  Urine Occurrence: 1  Stool Occurrence: 1  Emesis Occurrence: 0  Pad Count: 1    Labs/Accuchecks:  Recent Labs   Lab 06/19/23  0001 06/19/23  0305   WBC 2.36* 3.04*   RBC 2.19* 2.62*   HGB 6.6* 7.6*   HCT 21.2* 24.1*   PLT 41*  --       Recent Labs   Lab 06/19/23  0001   *   K 3.2*   CO2 25   *   BUN 14   CREATININE 0.4*   ALKPHOS 106  102   ALT 35  35   AST 83*  83*   BILITOT 7.2*  7.0*      Recent Labs   Lab 06/18/23  1620   INR 2.0*   APTT 35.6*      Recent Labs   Lab 06/13/23  1533   TROPONINI 0.029*       Electrolytes: Electrolytes replaced  Accuchecks:  Q6H    Gtts:      LDA/Wounds:  Lines/Drains/Airways       Drain  Duration                  Rectal Tube 06/03/23 1300 15 days    Female External Urinary Catheter 06/16/23 1505 2 days              Peripheral Intravenous Line  Duration                  Midline Catheter Insertion/Assessment  - Single Lumen 05/25/23 1005 Right basilic vein (medial side of arm) other (see comments) 24 days         Peripheral IV - Single Lumen 06/15/23 1229 20 G;1 3/4 in Right Lower leg 3 days         Peripheral IV - Single Lumen 06/18/23 1421 20 G Anterior;Right Forearm <1 day         Peripheral IV - Single Lumen 06/19/23 0300 20 G Anterior;Left;Proximal Forearm <1 day                  Wounds: Yes  Wound care consulted: Yes

## 2023-06-19 NOTE — PLAN OF CARE
[facility] Care Plan    POC reviewed with Marely Hamilton and family at 1400. Pt verbalized understanding. Questions and concerns addressed. No acute events today. Pt progressing toward goals. Will continue to monitor. See below and flowsheets for full assessment and VS info.     - US of RUE obtained for significant swelling of extremity  - Meds crushed, NPO otherwise. Holding on placing feeding tube d/t low platelets      Is this a stroke patient? no    Neuro:  [place] Coma Scale  Best Eye Response: 4-->(E4) spontaneous  Best Motor Response: 6-->(M6) obeys commands  Best Verbal Response: 4-->(V4) confused  [place] Coma Scale Score: 14  Assessment Qualifiers: no eye obstruction present, patient not sedated/intubated  Pupil PERRLA: no     24 hr Temp:  [97.2 °F (36.2 °C)-98.5 °F (36.9 °C)]     CV:   Rhythm: normal sinus rhythm, sinus tachycardia  BP goals:   SBP < 160  MAP > 65    Resp:      Vent Mode: Spont  Set Rate: 12 BPM  Oxygen Concentration (%): 30  Vt Set: 340 mL  PEEP/CPAP: 5 cmH20  Pressure Support: 10 cmH20    Plan:  4L NC    GI/:     Diet/Nutrition Received: NPO, ice chips (meds crushed)  Last Bowel Movement: 06/19/23  Voiding Characteristics: other (see comments) (intermittent straight cath required per shift)    Intake/Output Summary (Last 24 hours) at 6/19/2023 1706  Last data filed at 6/19/2023 1301  Gross per 24 hour   Intake 1217.66 ml   Output 1360 ml   Net -142.34 ml     Unmeasured Output  Urine Occurrence: 1  Stool Occurrence: 1  Emesis Occurrence: 0  Pad Count: 1    Labs/Accuchecks:  Recent Labs   Lab 06/19/23  0305   WBC 3.04*   RBC 2.62*   HGB 7.6*   HCT 24.1*   PLT 38*      Recent Labs   Lab 06/19/23  0001   *   K 3.2*   CO2 25   *   BUN 14   CREATININE 0.4*   ALKPHOS 106  102   ALT 35  35   AST 83*  83*   BILITOT 7.2*  7.0*      Recent Labs   Lab 06/19/23  0849   INR 1.8*   APTT 35.0*      Recent Labs   Lab 06/13/23  1533   TROPONINI 0.029*       Electrolytes: Electrolytes  replaced  Accuchecks: Q6H    Gtts:      LDA/Wounds:  Lines/Drains/Airways       Drain  Duration                  Rectal Tube 06/03/23 1300 16 days    Female External Urinary Catheter 06/16/23 1505 3 days              Peripheral Intravenous Line  Duration                  Midline Catheter Insertion/Assessment  - Single Lumen 05/25/23 1005 Right basilic vein (medial side of arm) other (see comments) 25 days         Peripheral IV - Single Lumen 06/15/23 1229 20 G;1 3/4 in Right Lower leg 4 days         Peripheral IV - Single Lumen 06/18/23 1421 20 G Anterior;Right Forearm 1 day         Peripheral IV - Single Lumen 06/19/23 0300 20 G Anterior;Left;Proximal Forearm <1 day                  Wounds: Yes  Wound care consulted: Yes

## 2023-06-19 NOTE — PT/OT/SLP EVAL
Speech Language Pathology Evaluation  Bedside Swallow    Patient Name:  Marely Hamilton   MRN:  [id number]  Admitting Diagnosis: Non-convulsive status epilepticus    Recommendations:                 General Recommendations:  Dysphagia therapy  Diet recommendations:  NPO, NPO   Aspiration Precautions: Feed only when awake/alert, HOB to 90 degrees, Meds crushed in puree, and Strict aspiration precautions   General Precautions: Standard, aspiration, fall  Communication strategies:  none    Assessment:     Marely Hamilton is a 58 y.o. female with an SLP diagnosis of Dysphagia and Cognitive-Linguistic Impairment.    History:     Past Medical History:   Diagnosis Date    Addiction to drug     Alcohol abuse     Alcohol abuse, in remission 6/15/2015    14.5 weeks ago; AA weekly    Anemia     Anxiety 6/15/2015    Behavioral problem     Bipolar disorder     Bipolar disorder in remission 6/15/2015    Cirrhosis, Laennec's 6/15/2015    Depression     Encounter for blood transfusion     Epistaxis 6/15/2015    Fatigue     Glaucoma     Hematuria     Hepatic encephalopathy 6/15/2015    Hepatic enlargement     History of psychiatric care     History of psychiatric hospitalization     History of seizure 6/15/2015    1    hx of intentional Tylenol overdose 6/15/2015    2005- situational and hx of bipolar    Hx of psychiatric care     Macrocytic anemia 9/18/2015    6 units PRBC since June 2015    Jeana     Osteoarthritis     Other ascites 6/15/2015    Psychiatric exam requested by authority     Psychiatric problem     Psychosis 9/26/2019    Renal disorder     Seizures     Self-harming behavior     Suicide attempt     Therapy     Thrombocytopenia 6/15/2015       Past Surgical History:   Procedure Laterality Date    COSMETIC SURGERY      EMBOLIZATION N/A 6/11/2023    Procedure: EMBOLIZATION, BLOOD VESSEL;  Surgeon: Debbie Surgeon;  [facility]: [facility];  Service: Anesthesiology;  Laterality: N/A;    ESOPHAGOGASTRODUODENOSCOPY         Social  History: Patient lives with jasmin    Prior diet: regular with thin.      Subjective     No family present  Patient goals: did not state     Pain/Comfort:  Pain Rating 1: 0/10  Pain Rating Post-Intervention 1: 0/10    Respiratory Status: Room air    Objective:     Oral Musculature Evaluation  Oral Musculature: unable to assess due to poor participation/comprehension  Dentition: present and adequate  Secretion Management: adequate  Mucosal Quality: dry  Mandibular Strength and Mobility: WFL  Oral Labial Strength and Mobility: WFL  Lingual Strength and Mobility: WFL  Velar Elevation: WFL  Buccal Strength and Mobility: WFL  Volitional Cough: not elicited  Volitional Swallow: not elicited  Voice Prior to PO Intake: wfl based on limited sample    Bedside Swallow Eval:   Consistencies Assessed:  2 teaspoons of water followed by 8 sips of water via cup with a straw  3 teaspoons of pudding      Oral Phase:   WFL    Pharyngeal Phase:   Pt. Demonstrated no coughing on trials of pudding and on teaspoosn of water.  On 3/8 trials of water via cup with a straw, pt. Demonstrated delayed cough.    Compensatory Strategies  None    Treatment: Education provided to pt. And nursing re role of slp and recommendations.  Recommend that pt. Remain npo with aspiration precautions and be allowed to take meds crushed in puree bolus.  ST will provided ongoing assessment of swallow to see if diet can be advanced or if ng tube needs to be reinserted.    Goals:   Multidisciplinary Problems       SLP Goals          Problem: SLP    Goal Priority Disciplines Outcome   SLP Goal     SLP Ongoing, Progressing   Description: Goals due 6/26  1.  Pt. Will participate in ongoing assessment of swallow to initiate least restrictive diet                   Multidisciplinary Problems (Resolved)          Problem: SLP    Goal Priority Disciplines Outcome   SLP Goal   (Resolved)     SLP Met                       Plan:     Patient to be seen:  4 x/week   Plan of Care  expires:  07/24/23  Plan of Care reviewed with:  patient   SLP Follow-Up:  Yes       Discharge recommendations:  other (see comments) (tba)   Barriers to Discharge:  Decreased Care Giver Support    Time Tracking:     SLP Treatment Date:   06/19/23  Speech Start Time:  0912  Speech Stop Time:  0926     Speech Total Time (min):  14 min    Billable Minutes: Eval Swallow and Oral Function 6 and Self Care/Home Management Training 8    06/19/2023

## 2023-06-19 NOTE — NURSING
Patient transported from room 9099 to room 9082 on continuous monitor and high flow nasal cannula on 6L. Nurse assumed care at 0450. Patient on continuous monitoring in 9082 and oriented to room. VSS.

## 2023-06-19 NOTE — PLAN OF CARE
[facility] Neuro Critical Care  Discharge Reassessment    Primary Care Provider: Viktor Ross MD    Expected Discharge Date: 6/21/2023  Pt is not medically stable for dc. DC plan    Reassessment (most recent)       Discharge Reassessment - 06/19/23 1204          Discharge Reassessment    Assessment Type Discharge Planning Reassessment     Did the patient's condition or plan change since previous assessment? Yes     Discharge Plan discussed with: Sibling     Name(s) and Number(s) Ann Enciso [phone]     Communicated BRAYAN with patient/caregiver Yes     Discharge Plan A Long-term acute care facility (LTAC)     Discharge Plan B Rehab     DME Needed Upon Discharge  none     Transition of Care Barriers None     Why the patient remains in the hospital Requires continued medical care        Post-Acute Status    Post-Acute Authorization Placement     Post-Acute Placement Status Referrals Sent     Coverage [employer]     Discharge Delays None known at this time                   Per MD: 06/17/2023 extubated on BiPap yesterday, weaning off FiO2. Plts trended down again, received transfusion overnight, no sign of active bleeding.   06/18/2023 Hg 6.7, receiving 1u pRBC. Plan to wean Bipap to high flow NC.  Patient remains on the NCSS floor. Multidisciplinary team continue to discuss safe discharge plan for pt. Will continue to follow for DC needs.        Danyell Palafox LMSW  PRN - [facility]  [phone]

## 2023-06-19 NOTE — CONSULTS
[facility] Neuro Critical Care  Wound Care    Patient Name:  Marely Hamilton   MRN:  [id number]  Date: 6/19/2023  Diagnosis: Non-convulsive status epilepticus    History:     Past Medical History:   Diagnosis Date    Addiction to drug     Alcohol abuse     Alcohol abuse, in remission 6/15/2015    14.5 weeks ago; AA weekly    Anemia     Anxiety 6/15/2015    Behavioral problem     Bipolar disorder     Bipolar disorder in remission 6/15/2015    Cirrhosis, Laennec's 6/15/2015    Depression     Encounter for blood transfusion     Epistaxis 6/15/2015    Fatigue     Glaucoma     Hematuria     Hepatic encephalopathy 6/15/2015    Hepatic enlargement     History of psychiatric care     History of psychiatric hospitalization     History of seizure 6/15/2015    1    hx of intentional Tylenol overdose 6/15/2015    2005- situational and hx of bipolar    Hx of psychiatric care     Macrocytic anemia 9/18/2015    6 units PRBC since June 2015    Jeana     Osteoarthritis     Other ascites 6/15/2015    Psychiatric exam requested by authority     Psychiatric problem     Psychosis 9/26/2019    Renal disorder     Seizures     Self-harming behavior     Suicide attempt     Therapy     Thrombocytopenia 6/15/2015       Social History     Socioeconomic History    Marital status: Single   Occupational History    Occupation: Disabled     Employer: DISABLED   Tobacco Use    Smoking status: Never    Smokeless tobacco: Never   Substance and Sexual Activity    Alcohol use: Not Currently     Comment: hx of ETOH abuse with cirrhosis of liver    Drug use: No    Sexual activity: Not Currently   Other Topics Concern    Patient feels they ought to cut down on drinking/drug use No    Patient annoyed by others criticizing their drinking/drug use No    Patient has felt bad or guilty about drinking/drug use No    Patient has had a drink/used drugs as an eye opener in the AM No   Social History Narrative    Pt has 1 older and 1 younger sister.  Her father  committed suicide when she was 18.  She has a [employer] degree and worked as an , but she has been disabled since age 40.  She never  and has no children.  She is not dating and claims no current friends.  She currently lives with her mother along with her pet dog.  She denies any hobbies and says she is not Church.     Social Determinants of Health     Financial Resource Strain: Unknown    Difficulty of Paying Living Expenses: Patient refused   Food Insecurity: Unknown    Worried About Running Out of Food in the Last Year: Patient refused    Ran Out of Food in the Last Year: Patient refused   Transportation Needs: Unknown    Lack of Transportation (Medical): Patient refused    Lack of Transportation (Non-Medical): Patient refused   Physical Activity: Unknown    Days of Exercise per Week: Patient refused    Minutes of Exercise per Session: Patient refused   Stress: Unknown    Feeling of Stress : Patient refused   Social Connections: Unknown    Frequency of Communication with Friends and Family: Patient refused    Frequency of Social Gatherings with Friends and Family: Patient refused    Attends Christian Services: Patient refused    Active Member of Clubs or Organizations: Patient refused    Attends Club or Organization Meetings: Patient refused    Marital Status: Patient refused   Housing Stability: Unknown    Unable to Pay for Housing in the Last Year: Patient refused    Number of Places Lived in the Last Year: 1    Unstable Housing in the Last Year: Patient refused       Precautions:     Allergies as of 05/27/2023 - Reviewed 05/21/2023   Allergen Reaction Noted    Sulfa (sulfonamide antibiotics) Rash 11/26/2014    Codeine Nausea And Vomiting 04/26/2018       [facility] Assessment Details/Treatment     Patient seen for wound care consultation for nose.   Reviewed chart for this encounter.   See Flow Sheet for findings.    Pt found lying in bed, agreeable to care at this time. Pt previously was intubated w/ NGT  for nutrition. Pt has partial thickness tissue loss noted to medial portion of nose, surrounding skin is pink and moist. Applied foam dressing at this time.    RECOMMENDATIONS:  Q5 days/PRN - change out foam dressing to bridge of pts nose.    Discussed POC with patient and primary RN.   See EMR for orders & patient education.      Nursing to continue care.  Nursing to maintain pressure injury prevention interventions. Skin Integrity NIKKI notified.           06/19/23 1217   WOCN Assessment   WOCN Total Time (mins) 5   Visit Date 06/19/23   Visit Time 1217   Consult Type New   WOCN Speciality Wound   Intervention assessed;applied;changed;chart review;coordination of care;orders   Teaching on-going        Altered Skin Integrity 06/18/23 2100 medial Nose Blister(s) Intact skin with non-blanchable redness of localized area   Date First Assessed/Time First Assessed: 06/18/23 2100   Altered Skin Integrity Present on Admission - Did Patient arrive to the hospital with altered skin?: suspected hospital acquired  Orientation: medial  Location: Nose  Primary Wound Type: Blister...   Wound Image    Dressing Appearance Open to air   Drainage Amount None   Drainage Characteristics/Odor No odor   Appearance Pink;Red;Moist   Tissue loss description Partial thickness   Periwound Area Redness   Wound Edges Irregular   Dressing Applied;Foam

## 2023-06-20 PROBLEM — I95.9 HYPOTENSION (ARTERIAL): Status: RESOLVED | Noted: 2023-06-04 | Resolved: 2023-06-20

## 2023-06-20 PROBLEM — R57.1 HYPOVOLEMIC SHOCK: Status: RESOLVED | Noted: 2023-06-11 | Resolved: 2023-06-20

## 2023-06-20 PROBLEM — R57.8 HEMORRHAGIC SHOCK: Status: RESOLVED | Noted: 2023-06-11 | Resolved: 2023-06-20

## 2023-06-20 PROBLEM — I82.C11 INTERNAL JUGULAR (IJ) VEIN THROMBOEMBOLISM, ACUTE, RIGHT: Status: ACTIVE | Noted: 2023-06-20

## 2023-06-20 PROBLEM — J98.11 ATELECTASIS: Status: ACTIVE | Noted: 2023-06-20

## 2023-06-20 PROBLEM — J96.01 ACUTE HYPOXEMIC RESPIRATORY FAILURE: Status: RESOLVED | Noted: 2023-05-28 | Resolved: 2023-06-20

## 2023-06-20 LAB
ALBUMIN SERPL BCP-MCNC: 2.2 G/DL (ref 3.5–5.2)
ALBUMIN SERPL BCP-MCNC: 2.3 G/DL (ref 3.5–5.2)
ALP SERPL-CCNC: 102 U/L (ref 55–135)
ALP SERPL-CCNC: 94 U/L (ref 55–135)
ALT SERPL W/O P-5'-P-CCNC: 28 U/L (ref 10–44)
ALT SERPL W/O P-5'-P-CCNC: 29 U/L (ref 10–44)
ANION GAP SERPL CALC-SCNC: 13 MMOL/L (ref 8–16)
ANION GAP SERPL CALC-SCNC: 9 MMOL/L (ref 8–16)
ANISOCYTOSIS BLD QL SMEAR: SLIGHT
APTT PPP: 33.9 SEC (ref 21–32)
AST SERPL-CCNC: 53 U/L (ref 10–40)
AST SERPL-CCNC: 56 U/L (ref 10–40)
BASOPHILS # BLD AUTO: 0.01 K/UL (ref 0–0.2)
BASOPHILS NFR BLD: 0.3 % (ref 0–1.9)
BILIRUB DIRECT SERPL-MCNC: 2.6 MG/DL (ref 0.1–0.3)
BILIRUB SERPL-MCNC: 7.3 MG/DL (ref 0.1–1)
BILIRUB SERPL-MCNC: 7.5 MG/DL (ref 0.1–1)
BUN SERPL-MCNC: 10 MG/DL (ref 6–20)
BUN SERPL-MCNC: 13 MG/DL (ref 6–20)
BURR CELLS BLD QL SMEAR: ABNORMAL
CALCIUM SERPL-MCNC: 7.7 MG/DL (ref 8.7–10.5)
CALCIUM SERPL-MCNC: 7.9 MG/DL (ref 8.7–10.5)
CHLORIDE SERPL-SCNC: 113 MMOL/L (ref 95–110)
CHLORIDE SERPL-SCNC: 114 MMOL/L (ref 95–110)
CO2 SERPL-SCNC: 22 MMOL/L (ref 23–29)
CO2 SERPL-SCNC: 26 MMOL/L (ref 23–29)
CREAT SERPL-MCNC: 0.4 MG/DL (ref 0.5–1.4)
CREAT SERPL-MCNC: 0.4 MG/DL (ref 0.5–1.4)
DIFFERENTIAL METHOD: ABNORMAL
EOSINOPHIL # BLD AUTO: 0.1 K/UL (ref 0–0.5)
EOSINOPHIL NFR BLD: 2 % (ref 0–8)
ERYTHROCYTE [DISTWIDTH] IN BLOOD BY AUTOMATED COUNT: 22.5 % (ref 11.5–14.5)
EST. GFR  (NO RACE VARIABLE): >60 ML/MIN/1.73 M^2
EST. GFR  (NO RACE VARIABLE): >60 ML/MIN/1.73 M^2
FIBRINOGEN PPP-MCNC: 102 MG/DL (ref 182–400)
GLUCOSE SERPL-MCNC: 111 MG/DL (ref 70–110)
GLUCOSE SERPL-MCNC: 144 MG/DL (ref 70–110)
HCT VFR BLD AUTO: 24.3 % (ref 37–48.5)
HGB BLD-MCNC: 7.9 G/DL (ref 12–16)
HYPOCHROMIA BLD QL SMEAR: ABNORMAL
IMM GRANULOCYTES # BLD AUTO: 0.04 K/UL (ref 0–0.04)
IMM GRANULOCYTES NFR BLD AUTO: 1.4 % (ref 0–0.5)
INR PPP: 1.8 (ref 0.8–1.2)
LYMPHOCYTES # BLD AUTO: 0.6 K/UL (ref 1–4.8)
LYMPHOCYTES NFR BLD: 19.3 % (ref 18–48)
MAGNESIUM SERPL-MCNC: 1.7 MG/DL (ref 1.6–2.6)
MCH RBC QN AUTO: 30.2 PG (ref 27–31)
MCHC RBC AUTO-ENTMCNC: 32.5 G/DL (ref 32–36)
MCV RBC AUTO: 93 FL (ref 82–98)
MONOCYTES # BLD AUTO: 0.3 K/UL (ref 0.3–1)
MONOCYTES NFR BLD: 8.8 % (ref 4–15)
NEUTROPHILS # BLD AUTO: 2 K/UL (ref 1.8–7.7)
NEUTROPHILS NFR BLD: 68.2 % (ref 38–73)
NRBC BLD-RTO: 0 /100 WBC
PHOSPHATE SERPL-MCNC: 2.3 MG/DL (ref 2.7–4.5)
PLATELET # BLD AUTO: 35 K/UL (ref 150–450)
PLATELET BLD QL SMEAR: ABNORMAL
PMV BLD AUTO: 9.9 FL (ref 9.2–12.9)
POCT GLUCOSE: 115 MG/DL (ref 70–110)
POCT GLUCOSE: 149 MG/DL (ref 70–110)
POIKILOCYTOSIS BLD QL SMEAR: SLIGHT
POLYCHROMASIA BLD QL SMEAR: ABNORMAL
POTASSIUM SERPL-SCNC: 2.5 MMOL/L (ref 3.5–5.1)
POTASSIUM SERPL-SCNC: 3.3 MMOL/L (ref 3.5–5.1)
PROT SERPL-MCNC: 4.3 G/DL (ref 6–8.4)
PROT SERPL-MCNC: 4.4 G/DL (ref 6–8.4)
PROTHROMBIN TIME: 18.6 SEC (ref 9–12.5)
RBC # BLD AUTO: 2.62 M/UL (ref 4–5.4)
SODIUM SERPL-SCNC: 148 MMOL/L (ref 136–145)
SODIUM SERPL-SCNC: 149 MMOL/L (ref 136–145)
WBC # BLD AUTO: 2.95 K/UL (ref 3.9–12.7)

## 2023-06-20 PROCEDURE — 25000003 PHARM REV CODE 250: Performed by: NURSE PRACTITIONER

## 2023-06-20 PROCEDURE — 84100 ASSAY OF PHOSPHORUS: CPT

## 2023-06-20 PROCEDURE — 99900035 HC TECH TIME PER 15 MIN (STAT)

## 2023-06-20 PROCEDURE — 94761 N-INVAS EAR/PLS OXIMETRY MLT: CPT

## 2023-06-20 PROCEDURE — 85610 PROTHROMBIN TIME: CPT | Performed by: PSYCHIATRY & NEUROLOGY

## 2023-06-20 PROCEDURE — 20000000 HC ICU ROOM

## 2023-06-20 PROCEDURE — 63600175 PHARM REV CODE 636 W HCPCS

## 2023-06-20 PROCEDURE — 94668 MNPJ CHEST WALL SBSQ: CPT

## 2023-06-20 PROCEDURE — 85384 FIBRINOGEN ACTIVITY: CPT | Performed by: PSYCHIATRY & NEUROLOGY

## 2023-06-20 PROCEDURE — 85025 COMPLETE CBC W/AUTO DIFF WBC: CPT | Performed by: PSYCHIATRY & NEUROLOGY

## 2023-06-20 PROCEDURE — 99291 PR CRITICAL CARE, E/M 30-74 MINUTES: ICD-10-PCS | Mod: ,,, | Performed by: PSYCHIATRY & NEUROLOGY

## 2023-06-20 PROCEDURE — 63600175 PHARM REV CODE 636 W HCPCS: Performed by: NURSE PRACTITIONER

## 2023-06-20 PROCEDURE — 63600175 PHARM REV CODE 636 W HCPCS: Performed by: STUDENT IN AN ORGANIZED HEALTH CARE EDUCATION/TRAINING PROGRAM

## 2023-06-20 PROCEDURE — 92526 ORAL FUNCTION THERAPY: CPT

## 2023-06-20 PROCEDURE — 94640 AIRWAY INHALATION TREATMENT: CPT

## 2023-06-20 PROCEDURE — 80048 BASIC METABOLIC PNL TOTAL CA: CPT | Mod: XB | Performed by: NURSE PRACTITIONER

## 2023-06-20 PROCEDURE — 99291 CRITICAL CARE FIRST HOUR: CPT | Mod: ,,, | Performed by: PSYCHIATRY & NEUROLOGY

## 2023-06-20 PROCEDURE — 83735 ASSAY OF MAGNESIUM: CPT

## 2023-06-20 PROCEDURE — 51701 INSERT BLADDER CATHETER: CPT

## 2023-06-20 PROCEDURE — 85730 THROMBOPLASTIN TIME PARTIAL: CPT | Performed by: PSYCHIATRY & NEUROLOGY

## 2023-06-20 PROCEDURE — 25000003 PHARM REV CODE 250: Performed by: PSYCHIATRY & NEUROLOGY

## 2023-06-20 PROCEDURE — 63600175 PHARM REV CODE 636 W HCPCS: Performed by: PSYCHIATRY & NEUROLOGY

## 2023-06-20 PROCEDURE — 99233 PR SUBSEQUENT HOSPITAL CARE,LEVL III: ICD-10-PCS | Mod: ,,, | Performed by: INTERNAL MEDICINE

## 2023-06-20 PROCEDURE — 25000003 PHARM REV CODE 250: Performed by: STUDENT IN AN ORGANIZED HEALTH CARE EDUCATION/TRAINING PROGRAM

## 2023-06-20 PROCEDURE — 27000221 HC OXYGEN, UP TO 24 HOURS

## 2023-06-20 PROCEDURE — 80076 HEPATIC FUNCTION PANEL: CPT | Performed by: NURSE PRACTITIONER

## 2023-06-20 PROCEDURE — 99233 SBSQ HOSP IP/OBS HIGH 50: CPT | Mod: ,,, | Performed by: INTERNAL MEDICINE

## 2023-06-20 PROCEDURE — 25000242 PHARM REV CODE 250 ALT 637 W/ HCPCS: Performed by: PSYCHIATRY & NEUROLOGY

## 2023-06-20 PROCEDURE — 80053 COMPREHEN METABOLIC PANEL: CPT

## 2023-06-20 PROCEDURE — C9113 INJ PANTOPRAZOLE SODIUM, VIA: HCPCS | Performed by: STUDENT IN AN ORGANIZED HEALTH CARE EDUCATION/TRAINING PROGRAM

## 2023-06-20 RX ORDER — POTASSIUM CHLORIDE 7.45 MG/ML
80 INJECTION INTRAVENOUS
Status: DISCONTINUED | OUTPATIENT
Start: 2023-06-20 | End: 2023-06-30

## 2023-06-20 RX ORDER — MAGNESIUM SULFATE HEPTAHYDRATE 40 MG/ML
4 INJECTION, SOLUTION INTRAVENOUS
Status: DISCONTINUED | OUTPATIENT
Start: 2023-06-20 | End: 2023-06-30

## 2023-06-20 RX ORDER — OXYCODONE HYDROCHLORIDE 5 MG/1
5 TABLET ORAL EVERY 6 HOURS
Status: DISCONTINUED | OUTPATIENT
Start: 2023-06-20 | End: 2023-06-22

## 2023-06-20 RX ORDER — POTASSIUM CHLORIDE 7.45 MG/ML
40 INJECTION INTRAVENOUS
Status: DISCONTINUED | OUTPATIENT
Start: 2023-06-20 | End: 2023-06-30

## 2023-06-20 RX ORDER — SODIUM CHLORIDE, SODIUM LACTATE, POTASSIUM CHLORIDE, CALCIUM CHLORIDE 600; 310; 30; 20 MG/100ML; MG/100ML; MG/100ML; MG/100ML
INJECTION, SOLUTION INTRAVENOUS CONTINUOUS
Status: DISCONTINUED | OUTPATIENT
Start: 2023-06-20 | End: 2023-06-20

## 2023-06-20 RX ORDER — MAGNESIUM SULFATE HEPTAHYDRATE 40 MG/ML
2 INJECTION, SOLUTION INTRAVENOUS
Status: DISCONTINUED | OUTPATIENT
Start: 2023-06-20 | End: 2023-06-30

## 2023-06-20 RX ORDER — SPIRONOLACTONE 25 MG/1
25 TABLET ORAL DAILY
Status: DISCONTINUED | OUTPATIENT
Start: 2023-06-20 | End: 2023-06-21

## 2023-06-20 RX ORDER — POTASSIUM CHLORIDE 7.45 MG/ML
60 INJECTION INTRAVENOUS
Status: DISCONTINUED | OUTPATIENT
Start: 2023-06-20 | End: 2023-06-30

## 2023-06-20 RX ORDER — OXYCODONE HYDROCHLORIDE 5 MG/1
5 TABLET ORAL EVERY 6 HOURS PRN
Status: CANCELLED | OUTPATIENT
Start: 2023-06-20

## 2023-06-20 RX ADMIN — PANTOPRAZOLE SODIUM 40 MG: 40 INJECTION, POWDER, FOR SOLUTION INTRAVENOUS at 08:06

## 2023-06-20 RX ADMIN — LEVETIRACETAM 500 MG: 100 INJECTION, SOLUTION INTRAVENOUS at 08:06

## 2023-06-20 RX ADMIN — IPRATROPIUM BROMIDE AND ALBUTEROL SULFATE 3 ML: 2.5; .5 SOLUTION RESPIRATORY (INHALATION) at 01:06

## 2023-06-20 RX ADMIN — IPRATROPIUM BROMIDE AND ALBUTEROL SULFATE 3 ML: 2.5; .5 SOLUTION RESPIRATORY (INHALATION) at 09:06

## 2023-06-20 RX ADMIN — PIPERACILLIN SODIUM AND TAZOBACTAM SODIUM 4.5 G: 4; .5 INJECTION, POWDER, LYOPHILIZED, FOR SOLUTION INTRAVENOUS at 01:06

## 2023-06-20 RX ADMIN — POTASSIUM CHLORIDE 10 MEQ: 7.46 INJECTION, SOLUTION INTRAVENOUS at 06:06

## 2023-06-20 RX ADMIN — POTASSIUM CHLORIDE 10 MEQ: 7.46 INJECTION, SOLUTION INTRAVENOUS at 03:06

## 2023-06-20 RX ADMIN — RIFAXIMIN 550 MG: 550 TABLET ORAL at 08:06

## 2023-06-20 RX ADMIN — ACETYLCYSTEINE 4 ML: 100 INHALANT RESPIRATORY (INHALATION) at 09:06

## 2023-06-20 RX ADMIN — ACETYLCYSTEINE 4 ML: 100 INHALANT RESPIRATORY (INHALATION) at 01:06

## 2023-06-20 RX ADMIN — POTASSIUM CHLORIDE 10 MEQ: 7.46 INJECTION, SOLUTION INTRAVENOUS at 09:06

## 2023-06-20 RX ADMIN — POTASSIUM CHLORIDE 10 MEQ: 7.46 INJECTION, SOLUTION INTRAVENOUS at 05:06

## 2023-06-20 RX ADMIN — ARIPIPRAZOLE 5 MG: 5 TABLET ORAL at 08:06

## 2023-06-20 RX ADMIN — PIPERACILLIN SODIUM AND TAZOBACTAM SODIUM 4.5 G: 4; .5 INJECTION, POWDER, LYOPHILIZED, FOR SOLUTION INTRAVENOUS at 05:06

## 2023-06-20 RX ADMIN — OXYCODONE HYDROCHLORIDE 5 MG: 5 TABLET ORAL at 08:06

## 2023-06-20 RX ADMIN — OLANZAPINE 5 MG: 2.5 TABLET, FILM COATED ORAL at 08:06

## 2023-06-20 RX ADMIN — SPIRONOLACTONE 25 MG: 25 TABLET, FILM COATED ORAL at 11:06

## 2023-06-20 RX ADMIN — POTASSIUM CHLORIDE 10 MEQ: 7.46 INJECTION, SOLUTION INTRAVENOUS at 01:06

## 2023-06-20 RX ADMIN — VANCOMYCIN HYDROCHLORIDE 1000 MG: 1 INJECTION, POWDER, LYOPHILIZED, FOR SOLUTION INTRAVENOUS at 11:06

## 2023-06-20 RX ADMIN — POTASSIUM CHLORIDE 10 MEQ: 7.46 INJECTION, SOLUTION INTRAVENOUS at 02:06

## 2023-06-20 RX ADMIN — SILODOSIN 4 MG: 4 CAPSULE ORAL at 08:06

## 2023-06-20 RX ADMIN — IPRATROPIUM BROMIDE AND ALBUTEROL SULFATE 3 ML: 2.5; .5 SOLUTION RESPIRATORY (INHALATION) at 06:06

## 2023-06-20 RX ADMIN — POTASSIUM CHLORIDE 10 MEQ: 7.46 INJECTION, SOLUTION INTRAVENOUS at 04:06

## 2023-06-20 RX ADMIN — PIPERACILLIN SODIUM AND TAZOBACTAM SODIUM 4.5 G: 4; .5 INJECTION, POWDER, LYOPHILIZED, FOR SOLUTION INTRAVENOUS at 10:06

## 2023-06-20 RX ADMIN — ACETYLCYSTEINE 4 ML: 100 INHALANT RESPIRATORY (INHALATION) at 06:06

## 2023-06-20 RX ADMIN — POTASSIUM CHLORIDE 10 MEQ: 7.46 INJECTION, SOLUTION INTRAVENOUS at 12:06

## 2023-06-20 RX ADMIN — SODIUM CHLORIDE, POTASSIUM CHLORIDE, SODIUM LACTATE AND CALCIUM CHLORIDE 250 ML: 600; 310; 30; 20 INJECTION, SOLUTION INTRAVENOUS at 11:06

## 2023-06-20 NOTE — PLAN OF CARE
[facility] Care Plan    POC reviewed with Marely Hamilton and family at 1400. Pt verbalized understanding. Questions and concerns addressed. No acute events today. Pt progressing toward goals. Will continue to monitor. See below and flowsheets for full assessment and VS info.   - 250mL LR bolus given   - K+ replaced   - 6L high flow nasal cannula in place for o2 >89   - Pt refused bath x 2           Is this a stroke patient? no    Neuro:  Gaylord Coma Scale  Best Eye Response: 4-->(E4) spontaneous  Best Motor Response: 6-->(M6) obeys commands  Best Verbal Response: 4-->(V4) confused  Cheryl Coma Scale Score: 14  Assessment Qualifiers: no eye obstruction present  Pupil PERRLA: no     24 hr Temp:  [97.4 °F (36.3 °C)-97.8 °F (36.6 °C)]     CV:   Rhythm: normal sinus rhythm  BP goals:   SBP < 180  MAP > 65    Resp:      Vent Mode: Spont  Set Rate: 12 BPM  Oxygen Concentration (%): 30  Vt Set: 340 mL  PEEP/CPAP: 5 cmH20  Pressure Support: 10 cmH20    Plan: N/A    GI/:     Diet/Nutrition Received: NPO, other (see comments) (meds in puree)  Last Bowel Movement: 06/20/23  Voiding Characteristics: unable to void    Intake/Output Summary (Last 24 hours) at 6/20/2023 1827  Last data filed at 6/20/2023 1805  Gross per 24 hour   Intake 2078.06 ml   Output 800 ml   Net 1278.06 ml     Unmeasured Output  Urine Occurrence: 1  Stool Occurrence: 1  Emesis Occurrence: 0  Pad Count: 1    Labs/Accuchecks:  Recent Labs   Lab 06/20/23  0033   WBC 2.95*   RBC 2.62*   HGB 7.9*   HCT 24.3*   PLT 35*      Recent Labs   Lab 06/20/23  0033   *   K 2.5*   CO2 26   *   BUN 13   CREATININE 0.4*   ALKPHOS 94  102   ALT 29  28   AST 53*  56*   BILITOT 7.3*  7.5*      Recent Labs   Lab 06/20/23  0903   INR 1.8*   APTT 33.9*    No results for input(s): CPK, CPKMB, TROPONINI, MB in the last 168 hours.    Electrolytes: Electrolytes replaced  Accuchecks: none    Gtts:      LDA/Wounds:  Lines/Drains/Airways       Drain  Duration                   Rectal Tube 06/03/23 1300 17 days              Peripheral Intravenous Line  Duration                  Peripheral IV - Single Lumen 06/15/23 1229 20 G;1 3/4 in Right Lower leg 5 days         Peripheral IV - Single Lumen 06/19/23 0300 20 G Anterior;Left;Proximal Forearm 1 day                  Wounds: Yes  Wound care consulted: Yes

## 2023-06-20 NOTE — ASSESSMENT & PLAN NOTE
Extubated 6/16  Increasing O2 requirements- 6L NC  CXR with atelectasis  Pulmonary toileting  Sit up on side of bed or cardiac chair with assistance

## 2023-06-20 NOTE — ASSESSMENT & PLAN NOTE
Continue Rifaximin   Ammonia normalized   Follow coags and fibrinogen    Consider re-consult hepatology as stabilizes   Spironolactone started, monitor K level, replaced prn

## 2023-06-20 NOTE — PLAN OF CARE
[facility] Care Plan    POC reviewed with Marely Hamilton at 0300. Pt verbalized understanding, but needs reinforcement. No acute events overnight. Pt progressing toward goals. Will continue to monitor. See below and flowsheets for full assessment and VS info.     -Critical value of K (2.5) replacing  -Critical value of platelets (35)  -straight cath x1      Is this a stroke patient? no    Neuro:  Cheryl Coma Scale  Best Eye Response: 4-->(E4) spontaneous  Best Motor Response: 6-->(M6) obeys commands  Best Verbal Response: 4-->(V4) confused  Edison Coma Scale Score: 14  Assessment Qualifiers: no eye obstruction present  Pupil PERRLA: no     24hr Temp:  [97.2 °F (36.2 °C)-98.2 °F (36.8 °C)]     CV:   Rhythm: normal sinus rhythm, sinus tachycardia  BP goals:   SBP < 180  MAP > 65    Resp:      Vent Mode: Spont  Set Rate: 12 BPM  Oxygen Concentration (%): 30  Vt Set: 340 mL  PEEP/CPAP: 5 cmH20  Pressure Support: 10 cmH20    Plan: N/A    GI/:     Diet/Nutrition Received: NPO, ice chips  Last Bowel Movement: 06/19/23  Voiding Characteristics: other (see comments) (intermittent straight cath required)    Intake/Output Summary (Last 24 hours) at 6/20/2023 0333  Last data filed at 6/20/2023 0301  Gross per 24 hour   Intake 1577.81 ml   Output 500 ml   Net 1077.81 ml     Unmeasured Output  Urine Occurrence: 1  Stool Occurrence: 1  Emesis Occurrence: 0  Pad Count: 1    Labs/Accuchecks:  Recent Labs   Lab 06/20/23  0033   WBC 2.95*   RBC 2.62*   HGB 7.9*   HCT 24.3*   PLT 35*      Recent Labs   Lab 06/20/23  0033   *   K 2.5*   CO2 26   *   BUN 13   CREATININE 0.4*   ALKPHOS 94  102   ALT 29  28   AST 53*  56*   BILITOT 7.3*  7.5*      Recent Labs   Lab 06/19/23  1831   INR 1.8*   APTT 38.7*      Recent Labs   Lab 06/13/23  1533   TROPONINI 0.029*       Electrolytes: Electrolytes replaced  Accuchecks: Q6H    Gtts:      LDA/Wounds:  Lines/Drains/Airways       Drain  Duration                  Rectal Tube 06/03/23  1300 16 days    Female External Urinary Catheter 06/16/23 1505 3 days              Peripheral Intravenous Line  Duration                  Peripheral IV - Single Lumen 06/15/23 1229 20 G;1 3/4 in Right Lower leg 4 days         Peripheral IV - Single Lumen 06/19/23 0300 20 G Anterior;Left;Proximal Forearm 1 day                  Wounds: Yes  Wound care consulted: Yes

## 2023-06-20 NOTE — PROGRESS NOTES
[facility]  Neurocritical Care  Progress Note    Admit Date: 5/28/2023  Service Date: 06/20/2023  Length of Stay: 23    Subjective:     Chief Complaint: Non-convulsive status epilepticus    History of Present Illness: Marely Hamilton is a 58 year old female with a medical history significant for EtOH induced hepatic cirrhosis, seizure disorder on outpatient LEV and ZNS and bipolar disorder who was admitted to [facility] on 5/28 as a transfer from OSH for evaluation of persistent encephalopathy concerning for NCSE vs hepatic encephalopathy. History is obtained from chart as patient is unresponsive and unable to assist. Initially presented to [facility] on 5/18 for encephalopathy and thought to be multifactorial including UTI (Proteus mirabilis s/p CTX course), hypercalcemia 2/2 lithium toxicity (Lithium changed to Abilify per Psych), as well as hepatic encephalopathy secondary to medication non compliance. She was treated with lactulose and rifaximin with no improvement in her mental status. EEG showed generalized slowing as well as triphasic discharges in the left hemisphere. MRI Brain WO was unremarkable for acute neurologic change. Decision was made to transfer patient to [facility] for higher level of care and ongoing evaluation and management. On arrival, mental status exam has waxed and waned with patient being intermittently verbal and following commands. NH4 48.    NCC is consulted on hospital day 4 for admission after EEG showed frequent left hemispheric electrographic seizures lasting on average 10-30 seconds with prolonged seizures over 1.5 hours also noted. Per chart review, patient home dose of LEV increased from 1000mg to 1500mg BID, ZNS increased to 200mg. Vimpat 150mg BID added per General Neurology (had not been given prior to consult). On initial evaluation, patient is not responsive to voice or pain. Upward gaze noted. Decision made to transfer patient to [facility] for higher level of care and  airway watch.     Hospital Course: 06/02/2023 Tachycardic and hypotensive, transferred for development of mostly right hemispheric status, LOC exam remains poor  Intubated for airway protection, EEG has changed to mostly include triphasics with no definite seizure activity per Dr Boss, propofol stopped and AEDs simplified to keppra 1000mg bid only, wean off pressor, give colloids with crystalloid resuscitation  06/03/2023: remains on persistant significant pressor support despite adequate hydration, problems with third spacing and may have pulmonary hypertension as well, consider trial of stress steroids if hgb stabilizes  06/04/2023: levo down to 0.08mcg, start and advance TFs, vaso at 0.04U, ammonia has been stable, slight rise in tbili, check direct bili, hgb and plts improved, no clear source of bleeding, no source of bleeding on CT abdomen, consider superimposed hemolysis  06/05/2023 NAEON. Neurologic exam continues to improve, following commands in all extremities. Levo 0.02, weaning as able. Vaso discontinued, MAP>65. Daily SBT, monitor and hold TF at midnight for possible extubation tomorrow. Hemolysis work up ongoing, trending CBC. Continue CTX for SBP prophylaxis.   06/06/2023 Awake and following commands. SBT with low tidal volumes. Remains intubated for airway protection at this time with possible extubation tomorrow with continued neurologic improvement. Levo discontinued, maintaining SBP<65. Concern for hemolytic anemia related to autoimmune process vs hepatic dysfunction (elevated reticulocyte count and decreased haptoglobin).  06/07/2023 Rested on AC overnight due to low TV and fatigue. SBT with pressure support 10/5 this am, weaning ventilator as tolerated. Continue tube feeds with goal to optimize nutrition. Ammonia elevated, continue lactulose to encourage bowel movement.   06/08/2023 Failed SBT due to apnea. Some breaths on SIMV. Awake and following commands. Ammonia trending down (44) with BMs,  continue lactulose. Advance nutritional support with goal to increase strength towards extubation. Plt transfusions PRN. Cryo for fibrinogen <100.  06/09/2023 Venous US with R brachial and femoral DVT. Dicussed with Hematology with recommendation to start Argatroban with plts>50. Daily SBT with apnea, maintain on SIMV with backup rate of 10. Hyperammonemia resolved.   06/10/2023 patient is more alert and awake, tolerated SBT. Hematology agreed to switch to heparin for DVT given negative for HIT  06/11/2023   On AM rounds, patient was noted to be hypoxic and tachycardic. Hypoxia resolved with ventilator changes but patient continued to appear uncomfortable. Patient lied flat with noted improvement in oxygenation. Patient became acutely hypotensive, tachycardiac and hypoxic not responsive to ventilator changes. IVF bolus + Burton bump x3 + initiation of vasopressin due to concern for hypovolemic shock. Levophed added due to persistent hypertension. L femoral arterial line and R IJ trialysis placed emergently. STAT Hemoglobin 4.4. Received protamine for heparin reversal, 3u Plts and 3 FFP and 2 pRBC. STAT CTA abdo/pelvis with L retroperitoneal hematoma with + spot sign. IR consulted and patient taken class A for diagnostic angiogram with possible emobolization. Pressors weaned after volume resuscitation. Family updated over phone.   06/12/2023 s/p IR embolization of left lumbar and internal iliac branch foci of arterial extravasation. Hemoglobin stable, continue to trend CBC q8 with transfusion of pRBC for Hg<7. Restart trickle feeds. Albumin and lasix for dieresis. Low threshold for antibiotics given risk of SBP and RP hematoma. Unlikely to tolerate AC, will discuss placement of IVC filter with IR.   06/13/2023 Attempted to wean FiO2 overnight with noted drop of pO2. FiO2 increased to 55% and patient received albumin and was diuresed. Weaning this am with ABGs. Remains on fentanyl 12.5, neurologic exam unchanged. Hg  stable. Post AM rounds, patient with acute increase in O2 needs, STAT CTA chest/abd/pelvis to assess for PE vs further hemorrhage stable and without new findings. Continue current management.   06/14/2023 NAEON. Hg stable at 7.6. Plt 44, transfuse for <50. Pending IVC filter per IR for DVT burden with contraindications to AC.  06/15/2023 s/p IVC filter placement. Daily SBT, complicated by anxiety. Remains on spontaneous mode, 10/5, 40% FiO2 with goal to wean as able.   06/16/2023 Rested on AC overnight. Plts low, received 1 pack overnight. Daily SBT with plan to extubate as able.   06/17/2023 extubated on BiPap yesterday, weaning off FiO2. Plts trended down again, received transfusion overnight, no sign of active bleeding.   06/18/2023 Hg 6.7, receiving 1u pRBC. Plan to wean Bipap to high flow NC.  06/19/2023: down to 4L with sats at 100%, still requiring occasional transfusion, otherwise awake and able to interact appropriately with examiner  06/20/2023: Increasing O2 requirements, CXR with atelectasis, continued pancytopenia.    Review of Systems: Review of Systems   Musculoskeletal:         Right arm pain   Neurological:  Positive for weakness.        Confusion   Psychiatric/Behavioral:  The patient is nervous/anxious.        Vitals:   Temp: 97.4 °F (36.3 °C)  Pulse: 99  Rhythm: normal sinus rhythm  BP: (!) 181/81  MAP (mmHg): 116  Resp: 17  SpO2: (!) 90 %    Temp  Min: 97.4 °F (36.3 °C)  Max: 97.8 °F (36.6 °C)  Pulse  Min: 81  Max: 99  BP  Min: 127/68  Max: 181/81  MAP (mmHg)  Min: 84  Max: 116  Resp  Min: 14  Max: 27  SpO2  Min: 86 %  Max: 100 %    06/19 0701 - 06/20 0700  In: 1530.8   Out: 550 [Urine:550]   Unmeasured Output  Urine Occurrence: 1  Stool Occurrence: 1  Emesis Occurrence: 0  Pad Count: 1     Examination:   Constitutional: Critically ill appearing. No apparent distress.   Eyes: Conjunctiva clear, anicteric. Lids no lesions.  Head/Ears/Nose/Mouth/Throat/Neck: Moist mucous membranes.   External  ears, nose atraumatic. Bruising noted to BUE, worse on RUE.   Cardiovascular: Regular rhythm. BLE pitting edema.  Respiratory: Comfortable respirations. Clear to auscultation.  Gastrointestinal: Soft, nondistended, nontender. + bowel sounds.    Neurologic:  -GCS E 4 V 4 M 6  -Alert. Oriented to person. Follows commands.  -Cranial nerves: EOM intact, PERRL, no facial droop, + cough  -Motor: Moves BUE spontaneously, antigravity, BLE weakness  -Sensation: Intact to light touch    Medications:   Continuous Scheduledacetylcysteine 100 mg/ml (10%), 4 mL, Q8H  albuterol-ipratropium, 3 mL, Q8H  ARIPiprazole, 5 mg, Daily  levETIRAcetam (Keppra) IV (PEDS and ADULTS), 500 mg, Q12H  OLANZapine, 5 mg, BID  oxyCODONE, 5 mg, Q6H  pantoprazole, 40 mg, BID  piperacillin-tazobactam (Zosyn) IV (PEDS and ADULTS) (extended infusion is not appropriate), 4.5 g, Q8H  rifAXIMin, 550 mg, BID  silodosin, 4 mg, Daily  spironolactone, 25 mg, Daily  vancomycin (VANCOCIN) IVPB, 15 mg/kg, Q12H    PRNsodium chloride, , Q24H PRN  aluminum-magnesium hydroxide-simethicone, 30 mL, QID PRN  dextrose 10%, 12.5 g, PRN  dextrose 10%, 25 g, PRN  dextrose, 15 g, PRN  dextrose, 30 g, PRN  magnesium sulfate IVPB, 2 g, PRN  magnesium sulfate IVPB, 4 g, PRN  naloxone, 0.02 mg, PRN  ondansetron, 4 mg, Q8H PRN  potassium chloride, 40 mEq, PRN   And  potassium chloride, 60 mEq, PRN   And  potassium chloride, 80 mEq, PRN  racepinephrine, 0.5 mL, Q4H PRN  simethicone, 1 tablet, QID PRN  sodium chloride 0.9%, 10 mL, Q8H PRN  sodium phosphate IVPB, 15 mmol, PRN  sodium phosphate IVPB, 20.01 mmol, PRN  sodium phosphate IVPB, 30 mmol, PRN  vancomycin - pharmacy to dose, , pharmacy to manage frequency       Today I independently reviewed pertinent medications, lines/drains/airways, imaging, cardiology results, laboratory results, microbiology results, notably:     ISTAT: No results for input(s): PH, PCO2, PO2, POCSATURATED, HCO3, BE, POCNA, POCK, POCTCO2, POCGLU, POCICA,  POCLAC, SAMPLE in the last 24 hours.   Chem:   Recent Labs   Lab 06/20/23  0033   *   K 2.5*   *   CO2 26   *   BUN 13   CREATININE 0.4*   CALCIUM 7.7*   MG 1.7   PHOS 2.3*   ANIONGAP 9   PROT 4.3*  4.4*   ALBUMIN 2.2*  2.3*   BILITOT 7.3*  7.5*   BILIDIR 2.6*   ALKPHOS 94  102   AST 53*  56*   ALT 29  28     Heme:   Recent Labs   Lab 06/20/23  0033 06/20/23  0903   WBC 2.95*  --    HGB 7.9*  --    HCT 24.3*  --    PLT 35*  --    INR  --  1.8*   FIBRINOGEN  --  102*     Endo:   Recent Labs   Lab 06/19/23  1830 06/19/23  2353 06/20/23  0550   POCTGLUCOSE 140* 149* 115*     Assessment/Plan:     Neuro  * Non-convulsive status epilepticus  58F with EtOH induced hepatic cirrhosis, seizure disorder on outpatient LEV and ZNS and bipolar disorder admitted on 5/28 for persistent encephalopathy.    - UTI on admit (Proteus mirabilis), now s/p CTX course  - Hypercalcemia 2/2 lithium toxicity (Lithium changed to Abilify per Psych)  - Hepatic encephalopathy 2/2 to medication non compliance, treated with lactulose and rifaximin     MRI Brain WO was unremarkable for acute neurologic change  EEG w/ frequent left hemispheric electrographic seizures and NCSE  Repeat EEGs without seizure activity x 24 hours, now resolved    6/11 --> acute hypotension, hypoxia --> hypovolemic shock --> Large R peritoneal hematoma --> IR for class A embo --> s/p IR embolization of left lumbar and internal iliac branch foci of arterial extravasation  6/13 --> episode of hypoxia and SOB --> CTA PE without evidence of PE, CT C/A/P without new bleed    - Continued admission to [facility]  - Q4h neurochecks while in ICU  - Q1h vitals while in ICU  - HOB@30  - Keppra 500mg BID  - Thiamine replacement   - Lactulose, titrate to BM  - MAP>65  - Daily CMP, Mag, Phos - replete electrolytes PRN  - Lipid panel, A1c, Coags reviewed  - Daily CBC, transfuse PRN and replace Plts <25  - Daily fibrinogen, PT/INR  - Heparin held in setting of acute  bleed/DIC, IVC filter in place  - PT/OT/SLP as appropriate  - CM/SW consult for dispo planning    Acute encephalopathy  Multifactorial     Psychiatric  Bipolar 1 disorder  Continue abilify, zyprexa    Pulmonary  Atelectasis  Extubated 6/16  Increasing O2 requirements- 6L NC  CXR with atelectasis  Pulmonary toileting  Sit up on side of bed or cardiac chair with assistance    Hematology  Coagulopathy  Pancytopenia, low fibrinogen--> DIC post acute blood loss anemia  Transfuse for plt count < 25k    Acute deep vein thrombosis (DVT) of brachial vein of right upper extremity  Nonocclusive R brachial vein DVT in area of previous IV site, noted on 6/8  Also R IJ DVT noted on 6/19  Argatroban per Heme recs - transitioned to heparin 6/10  HELD 6/11 in setting of retroperitoneal bleed  IVC filter placed 6/14  Unable to anticoagulate due to continued pancytopenia/thrombocytopenia    Deep vein thrombosis (DVT) of femoral vein of right lower extremity  Nonocclusive right proximal femoral vein DVT  Noted on 6/8  Argatroban per Heme recs - transitioned to heparin  HELD 6/11 in setting of hypovolemic shock 2/2 retroperitoneal hemorrhage   IVC filter placed 6/14  Unable to anticoagulate due to continued pancytopenia/thrombocytopenia    Thrombocytopenia  Likely secondary to hepatic cirrhosis and development of splenomegaly  Hematology consulted, tested negative for HIT    6/11 --> acute hypotension, hypoxia --> hypovolemic shock --> Large R peritoneal hematoma --> IR for class A embo --> s/p IR embolization of left lumbar and internal iliac branch foci of arterial extravasation    Plts continue to trending down despite transfusion, however, no active bleeding. Will transfuse if plt < 25, or < 50 with active bleeding    Endocrine  Hyperammonemia  Stable on current medical regimen     Severe protein-calorie malnutrition  Decreased intake due to dysphagia  Diet texture per SLP  Nutrition following    GI  Hepatic cirrhosis  Continue  Rifaximin   Ammonia normalized   Follow coags and fibrinogen    Consider re-consult hepatology as stabilizes   Spironolactone started, monitor K level, replaced prn    Hepatic encephalopathy  Improving    Other  Retroperitoneal bleed  6/11 --> acute hypotension, hypoxia --> hypovolemic shock --> Large R peritoneal hematoma --> IR for class A embo --> s/p IR embolization of left lumbar and internal iliac branch foci of arterial extravasation    -Repeat CT c/a/p stable    The patient is being Prophylaxed for:  Venous Thromboembolism with: Mechanical  Stress Ulcer with: Not Applicable   Ventilator Pneumonia with: not applicable    Activity Orders          Discontinue arterial line starting at 06/18 1927    Diet NPO: NPO starting at 06/15 0500    Elevate HOB flat until bedrest complete starting at 06/11 1641    Turn patient starting at 05/28 2253    Progressive Mobility Protocol (mobilize patient to their highest level of functioning at least twice daily) starting at 05/28 2000        Full Code     Critical condition in that Patient has a condition that poses threat to life and bodily function: cirrhosis, hepatic encephalopathy, pancytopenia, DIC, hypoxia, atelectasis, DVT     40 minutes of Critical care time was spent personally by me on the following activities: development of treatment plan with patient or surrogate and bedside caregivers, discussions with consultants, evaluation of patient's response to treatment, examination of patient, ordering and performing treatments and interventions, ordering and review of laboratory studies, ordering and review of radiographic studies, pulse oximetry, antibiotic titration if applicable, vasopressor titration if applicable, re-evaluation of patient's condition. This critical care time did not overlap with that of any other provider or involve time for any procedures. There is high probability for acute neurological change leading to clinical and possibly life-threatening  deterioration requiring highest level of physician preparedness for urgent intervention.    Amy Naylor NP  Neurocritical Care  [facility]

## 2023-06-20 NOTE — HPI
Patient is a 58 year old female with extensive medical history including EtOH induced hepatic cirrhosis, seizure disorder and bipolar disorder who was admitted to [facility] on 5/28 as a transfer from OSH for evaluation of persistent encephalopathy concerning for NCSE vs hepatic encephalopathy. Patient initially admitted to hospital medicine. EEG was done due to waxing and waning mental status. Findings concerning for non convulsive status epilepticus so patient moved to neuro ICU where she was intubated; also hypotensive requiring pressor support. Patient's hospital course further complicated by DVT which was initially treated by argatroban (concern for HIT) followed by transition to heparin. Patient had significant retroperitoneal hematoma on anticoagulation. This was stopped and she had IVC filter placed on 6/14. Hematology re-consulted due to finding of occlusive thrombus is seen within the right internal jugular vein, not noted in 6/08 study.

## 2023-06-20 NOTE — ASSESSMENT & PLAN NOTE
Nonocclusive R brachial vein DVT in area of previous IV site, noted on 6/8  Also R IJ DVT noted on 6/19  Argatroban per Heme recs - transitioned to heparin 6/10  HELD 6/11 in setting of retroperitoneal bleed  IVC filter placed 6/14  Unable to anticoagulate due to continued pancytopenia/thrombocytopenia

## 2023-06-20 NOTE — SUBJECTIVE & OBJECTIVE
Objective:     Vital Signs (Most Recent):  Temp: 97.4 °F (36.3 °C) (06/20/23 1105)  Pulse: 92 (06/20/23 1631)  Resp: 18 (06/20/23 1631)  BP: (!) 177/79 (06/20/23 1605)  SpO2: (!) 93 % (06/20/23 1631) Vital Signs (24h Range):  Temp:  [97.4 °F (36.3 °C)-97.8 °F (36.6 °C)] 97.4 °F (36.3 °C)  Pulse:  [81-99] 92  Resp:  [14-27] 18  SpO2:  [86 %-100 %] 93 %  BP: (127-181)/(59-83) 177/79     Weight: 59.9 kg (132 lb)  Body mass index is 24.14 kg/m².  Body surface area is 1.62 meters squared.    Intake/Output - Last 3 Shifts         06/18 0700  06/19 0659 06/19 0700  06/20 0659 06/20 0700  06/21 0659    I.V. (mL/kg) 50 (0.8)      Blood 82.5      NG/GT 1030      IV Piggyback 1139.8 1530.8 1035    Total Intake(mL/kg) 2302.3 (38.4) 1530.8 (25.6) 1035 (17.3)    Urine (mL/kg/hr) 1050 (0.7) 550 (0.4)     Stool 310  50    Total Output 1360 550 50    Net +942.3 +980.8 +985                    Physical Exam  Vitals reviewed.   Constitutional:       General: She is not in acute distress.     Appearance: She is well-developed. She is ill-appearing.   HENT:      Head: Normocephalic and atraumatic.      Right Ear: External ear normal.      Left Ear: External ear normal.      Nose: Nose normal.      Mouth/Throat:      Mouth: Mucous membranes are moist.      Pharynx: Oropharynx is clear. No oropharyngeal exudate.   Eyes:      General: Scleral icterus present.      Extraocular Movements: Extraocular movements intact.      Conjunctiva/sclera: Conjunctivae normal.   Cardiovascular:      Rate and Rhythm: Regular rhythm. Tachycardia present.      Heart sounds: Normal heart sounds.   Pulmonary:      Effort: Pulmonary effort is normal.      Breath sounds: Normal breath sounds. No wheezing or rales.   Abdominal:      General: Bowel sounds are normal.      Palpations: Abdomen is soft.      Tenderness: There is no abdominal tenderness.   Musculoskeletal:         General: Swelling (right arm swelling from IV inflitration) present. No deformity.  Normal range of motion.      Cervical back: Normal range of motion and neck supple.      Right lower leg: Edema present.      Left lower leg: Edema present.   Skin:     General: Skin is warm and dry.      Findings: Bruising present.   Neurological:      Mental Status: She is alert. She is disoriented.          Significant Labs:   All pertinent labs from the last 24 hours have been reviewed.    Diagnostic Results:  I have reviewed all pertinent imaging results/findings within the past 24 hours.

## 2023-06-20 NOTE — ASSESSMENT & PLAN NOTE
Retroperitoneal bleed    - Despite no reports of IJ vein thromboembolism in previous US study, this can be highly  dependent which calls into question the chronicity of IJ thrombus. Swelling is much more likely due to infiltrated IV    - Regardless, risks of anticoagulation outweigh benefits at this point. When previously anticoagulated patient had retroperitoneal hematoma which caused her to become acutely hypotensive, tachycardiac and hypoxic not responsive to ventilator changes requiring IVF bolus + Burton bump x3 + initiation of vasopressin and levophed. In addition patient already has coagulopathy due to her liver disease and critical illness. She also has filter in place to hopefully reduce chance of pulmonary embolism

## 2023-06-20 NOTE — ASSESSMENT & PLAN NOTE
Nonocclusive right proximal femoral vein DVT  Noted on 6/8  Argatroban per Heme recs - transitioned to heparin  HELD 6/11 in setting of hypovolemic shock 2/2 retroperitoneal hemorrhage   IVC filter placed 6/14  Unable to anticoagulate due to continued pancytopenia/thrombocytopenia

## 2023-06-20 NOTE — ASSESSMENT & PLAN NOTE
Likely secondary to hepatic cirrhosis and development of splenomegaly  Hematology consulted, tested negative for HIT    6/11 --> acute hypotension, hypoxia --> hypovolemic shock --> Large R peritoneal hematoma --> IR for class A embo --> s/p IR embolization of left lumbar and internal iliac branch foci of arterial extravasation    Plts continue to trending down despite transfusion, however, no active bleeding. Will transfuse if plt < 25, or < 50 with active bleeding

## 2023-06-20 NOTE — SUBJECTIVE & OBJECTIVE
Review of Systems: Review of Systems   Musculoskeletal:         Right arm pain   Neurological:  Positive for weakness.        Confusion   Psychiatric/Behavioral:  The patient is nervous/anxious.        Vitals:   Temp: 97.4 °F (36.3 °C)  Pulse: 99  Rhythm: normal sinus rhythm  BP: (!) 181/81  MAP (mmHg): 116  Resp: 17  SpO2: (!) 90 %    Temp  Min: 97.4 °F (36.3 °C)  Max: 97.8 °F (36.6 °C)  Pulse  Min: 81  Max: 99  BP  Min: 127/68  Max: 181/81  MAP (mmHg)  Min: 84  Max: 116  Resp  Min: 14  Max: 27  SpO2  Min: 86 %  Max: 100 %    06/19 0701 - 06/20 0700  In: 1530.8   Out: 550 [Urine:550]   Unmeasured Output  Urine Occurrence: 1  Stool Occurrence: 1  Emesis Occurrence: 0  Pad Count: 1     Examination:   Constitutional: Critically ill appearing. No apparent distress.   Eyes: Conjunctiva clear, anicteric. Lids no lesions.  Head/Ears/Nose/Mouth/Throat/Neck: Moist mucous membranes.   External ears, nose atraumatic. Bruising noted to BUE, worse on RUE.   Cardiovascular: Regular rhythm. BLE pitting edema.  Respiratory: Comfortable respirations. Clear to auscultation.  Gastrointestinal: Soft, nondistended, nontender. + bowel sounds.    Neurologic:  -GCS E 4 V 4 M 6  -Alert. Oriented to person. Follows commands.  -Cranial nerves: EOM intact, PERRL, no facial droop, + cough  -Motor: Moves BUE spontaneously, antigravity, BLE weakness  -Sensation: Intact to light touch    Medications:   Continuous Scheduledacetylcysteine 100 mg/ml (10%), 4 mL, Q8H  albuterol-ipratropium, 3 mL, Q8H  ARIPiprazole, 5 mg, Daily  levETIRAcetam (Keppra) IV (PEDS and ADULTS), 500 mg, Q12H  OLANZapine, 5 mg, BID  oxyCODONE, 5 mg, Q6H  pantoprazole, 40 mg, BID  piperacillin-tazobactam (Zosyn) IV (PEDS and ADULTS) (extended infusion is not appropriate), 4.5 g, Q8H  rifAXIMin, 550 mg, BID  silodosin, 4 mg, Daily  spironolactone, 25 mg, Daily  vancomycin (VANCOCIN) IVPB, 15 mg/kg, Q12H    PRNsodium chloride, , Q24H PRN  aluminum-magnesium  hydroxide-simethicone, 30 mL, QID PRN  dextrose 10%, 12.5 g, PRN  dextrose 10%, 25 g, PRN  dextrose, 15 g, PRN  dextrose, 30 g, PRN  magnesium sulfate IVPB, 2 g, PRN  magnesium sulfate IVPB, 4 g, PRN  naloxone, 0.02 mg, PRN  ondansetron, 4 mg, Q8H PRN  potassium chloride, 40 mEq, PRN   And  potassium chloride, 60 mEq, PRN   And  potassium chloride, 80 mEq, PRN  racepinephrine, 0.5 mL, Q4H PRN  simethicone, 1 tablet, QID PRN  sodium chloride 0.9%, 10 mL, Q8H PRN  sodium phosphate IVPB, 15 mmol, PRN  sodium phosphate IVPB, 20.01 mmol, PRN  sodium phosphate IVPB, 30 mmol, PRN  vancomycin - pharmacy to dose, , pharmacy to manage frequency       Today I independently reviewed pertinent medications, lines/drains/airways, imaging, cardiology results, laboratory results, microbiology results, notably:     ISTAT: No results for input(s): PH, PCO2, PO2, POCSATURATED, HCO3, BE, POCNA, POCK, POCTCO2, POCGLU, POCICA, POCLAC, SAMPLE in the last 24 hours.   Chem:   Recent Labs   Lab 06/20/23  0033   *   K 2.5*   *   CO2 26   *   BUN 13   CREATININE 0.4*   CALCIUM 7.7*   MG 1.7   PHOS 2.3*   ANIONGAP 9   PROT 4.3*  4.4*   ALBUMIN 2.2*  2.3*   BILITOT 7.3*  7.5*   BILIDIR 2.6*   ALKPHOS 94  102   AST 53*  56*   ALT 29  28     Heme:   Recent Labs   Lab 06/20/23  0033 06/20/23  0903   WBC 2.95*  --    HGB 7.9*  --    HCT 24.3*  --    PLT 35*  --    INR  --  1.8*   FIBRINOGEN  --  102*     Endo:   Recent Labs   Lab 06/19/23  1830 06/19/23  2353 06/20/23  0550   POCTGLUCOSE 140* 149* 115*

## 2023-06-20 NOTE — ASSESSMENT & PLAN NOTE
58F with EtOH induced hepatic cirrhosis, seizure disorder on outpatient LEV and ZNS and bipolar disorder admitted on 5/28 for persistent encephalopathy.    - UTI on admit (Proteus mirabilis), now s/p CTX course  - Hypercalcemia 2/2 lithium toxicity (Lithium changed to Abilify per Psych)  - Hepatic encephalopathy 2/2 to medication non compliance, treated with lactulose and rifaximin     MRI Brain WO was unremarkable for acute neurologic change  EEG w/ frequent left hemispheric electrographic seizures and NCSE  Repeat EEGs without seizure activity x 24 hours, now resolved    6/11 --> acute hypotension, hypoxia --> hypovolemic shock --> Large R peritoneal hematoma --> IR for class A embo --> s/p IR embolization of left lumbar and internal iliac branch foci of arterial extravasation  6/13 --> episode of hypoxia and SOB --> CTA PE without evidence of PE, CT C/A/P without new bleed    - Continued admission to [facility]  - Q4h neurochecks while in ICU  - Q1h vitals while in ICU  - HOB@30  - Keppra 500mg BID  - Thiamine replacement   - Lactulose, titrate to BM  - MAP>65  - Daily CMP, Mag, Phos - replete electrolytes PRN  - Lipid panel, A1c, Coags reviewed  - Daily CBC, transfuse PRN and replace Plts <25  - Daily fibrinogen, PT/INR  - Heparin held in setting of acute bleed/DIC, IVC filter in place  - PT/OT/SLP as appropriate  - CM/SW consult for dispo planning

## 2023-06-20 NOTE — ASSESSMENT & PLAN NOTE
6/11 --> acute hypotension, hypoxia --> hypovolemic shock --> Large R peritoneal hematoma --> IR for class A embo --> s/p IR embolization of left lumbar and internal iliac branch foci of arterial extravasation    -Repeat CT c/a/p stable

## 2023-06-20 NOTE — PT/OT/SLP PROGRESS
Speech Language Pathology Treatment    Patient Name:  Marely Hamilton   MRN:  [id number]  Admitting Diagnosis: Non-convulsive status epilepticus    Recommendations:                 General Recommendations:  Dysphagia therapy  Diet recommendations:  Puree, Liquid Diet Level: Nectar Thick   Aspiration Precautions: Feed only when awake/alert, HOB to 90 degrees, Small bites/sips, and Standard aspiration precautions   General Precautions: Standard, aspiration, fall  Communication strategies:  provide increased time to answer    Assessment:     Marely Hamilton is a 58 y.o. female with an SLP diagnosis of Dysphagia.      Subjective     Awake/alert    Pain/Comfort:  Pain Rating 1: 0/10  Pain Rating Post-Intervention 1: 0/10    Respiratory Status: Room air    Objective:     Has the patient been evaluated by SLP for swallowing?   Yes  Keep patient NPO? Yes     Pt repositioned upright in bed for PO trials. Confusion evident throughout session requiring increased time to answer and delayed or no response throughout session. She initially refused trials with head turn, but did accept when trials continually offered. Coughing noted with thin liquids trials x2. Nectar thick liquids x6 oz and puree x4 tolerated with timely swallow initiation and no overt s/s of airway compromise. Recommend nectar thick liquids/pureed diet at this time. SLP will follow up for ongoing swallow assessment.     Goals:   Multidisciplinary Problems       SLP Goals          Problem: SLP    Goal Priority Disciplines Outcome   SLP Goal     SLP Ongoing, Progressing   Description: Goals due 6/26  1.  Pt. Will participate in ongoing assessment of swallow to initiate least restrictive diet                   Multidisciplinary Problems (Resolved)          Problem: SLP    Goal Priority Disciplines Outcome   SLP Goal   (Resolved)     SLP Met                       Plan:     Patient to be seen:  4 x/week   Plan of Care expires:  07/24/23  Plan of Care reviewed with:   patient   SLP Follow-Up:  Yes       Discharge recommendations:  other (see comments) (tba)     Time Tracking:     SLP Treatment Date:   06/20/23  Speech Start Time:  1015  Speech Stop Time:  1032     Speech Total Time (min):  17 min    Billable Minutes: Treatment Swallowing Dysfunction 17    06/20/2023

## 2023-06-20 NOTE — CONSULTS
[facility]  Hematology  Bone Marrow Transplant  Consult Note    Patient Name: Marely Hamilton  Admission Date: 5/28/2023  Hospital Length of Stay: 23 days  Code Status: Full Code     HPI:  Patient is a 58 year old female with extensive medical history including EtOH induced hepatic cirrhosis, seizure disorder and bipolar disorder who was admitted to [facility] on 5/28 as a transfer from OSH for evaluation of persistent encephalopathy concerning for NCSE vs hepatic encephalopathy. Patient initially admitted to hospital medicine. EEG was done due to waxing and waning mental status. Findings concerning for non convulsive status epilepticus so patient moved to neuro ICU where she was intubated; also hypotensive requiring pressor support. Patient's hospital course further complicated by DVT which was initially treated by argatroban (concern for HIT) followed by transition to heparin. Patient had significant retroperitoneal hematoma on anticoagulation. This was stopped and she had IVC filter placed on 6/14. Hematology re-consulted due to finding of occlusive thrombus is seen within the right internal jugular vein, not noted in 6/08 study.       Objective:     Vital Signs (Most Recent):  Temp: 97.4 °F (36.3 °C) (06/20/23 1105)  Pulse: 92 (06/20/23 1631)  Resp: 18 (06/20/23 1631)  BP: (!) 177/79 (06/20/23 1605)  SpO2: (!) 93 % (06/20/23 1631) Vital Signs (24h Range):  Temp:  [97.4 °F (36.3 °C)-97.8 °F (36.6 °C)] 97.4 °F (36.3 °C)  Pulse:  [81-99] 92  Resp:  [14-27] 18  SpO2:  [86 %-100 %] 93 %  BP: (127-181)/(59-83) 177/79     Weight: 59.9 kg (132 lb)  Body mass index is 24.14 kg/m².  Body surface area is 1.62 meters squared.    Intake/Output - Last 3 Shifts         06/18 0700  06/19 0659 06/19 0700 06/20 0659 06/20 0700  06/21 0659    I.V. (mL/kg) 50 (0.8)      Blood 82.5      NG/GT 1030      IV Piggyback 1139.8 1530.8 1035    Total Intake(mL/kg) 2302.3 (38.4) 1530.8 (25.6) 1035 (17.3)    Urine (mL/kg/hr)  1050 (0.7) 550 (0.4)     Stool 310  50    Total Output 1360 550 50    Net +942.3 +980.8 +985                    Physical Exam  Vitals reviewed.   Constitutional:       General: She is not in acute distress.     Appearance: She is well-developed. She is ill-appearing.   HENT:      Head: Normocephalic and atraumatic.      Right Ear: External ear normal.      Left Ear: External ear normal.      Nose: Nose normal.      Mouth/Throat:      Mouth: Mucous membranes are moist.      Pharynx: Oropharynx is clear. No oropharyngeal exudate.   Eyes:      General: Scleral icterus present.      Extraocular Movements: Extraocular movements intact.      Conjunctiva/sclera: Conjunctivae normal.   Cardiovascular:      Rate and Rhythm: Regular rhythm. Tachycardia present.      Heart sounds: Normal heart sounds.   Pulmonary:      Effort: Pulmonary effort is normal.      Breath sounds: Normal breath sounds. No wheezing or rales.   Abdominal:      General: Bowel sounds are normal.      Palpations: Abdomen is soft.      Tenderness: There is no abdominal tenderness.   Musculoskeletal:         General: Swelling (right arm swelling from IV inflitration) present. No deformity. Normal range of motion.      Cervical back: Normal range of motion and neck supple.      Right lower leg: Edema present.      Left lower leg: Edema present.   Skin:     General: Skin is warm and dry.      Findings: Bruising present.   Neurological:      Mental Status: She is alert. She is disoriented.          Significant Labs:   All pertinent labs from the last 24 hours have been reviewed.    Diagnostic Results:  I have reviewed all pertinent imaging results/findings within the past 24 hours.    Assessment/Plan:     Internal jugular (IJ) vein thromboembolism, acute, right  Retroperitoneal bleed    - Despite no reports of IJ vein thromboembolism in previous US study, this can be highly  dependent which calls into question the chronicity of IJ thrombus. Swelling is  much more likely due to infiltrated IV    - Regardless, risks of anticoagulation outweigh benefits at this point. When previously anticoagulated patient had retroperitoneal hematoma which caused her to become acutely hypotensive, tachycardiac and hypoxic not responsive to ventilator changes requiring IVF bolus + Burton bump x3 + initiation of vasopressin and levophed. In addition patient already has coagulopathy due to her liver disease and critical illness. She also has filter in place to hopefully reduce chance of pulmonary embolism         VTE Risk Mitigation (From admission, onward)           Ordered     IP VTE LOW RISK PATIENT  Once         05/28/23 1129     Place sequential compression device  Until discontinued         05/28/23 1129                    Disposition: Hematology will sign off, please contact us with any further questions or concerns.     Rios Dong MD  Bone Marrow Transplant  [facility]

## 2023-06-21 LAB
ALBUMIN SERPL BCP-MCNC: 2.3 G/DL (ref 3.5–5.2)
ALLENS TEST: ABNORMAL
ALP SERPL-CCNC: 111 U/L (ref 55–135)
ALT SERPL W/O P-5'-P-CCNC: 27 U/L (ref 10–44)
AMMONIA PLAS-SCNC: 38 UMOL/L (ref 10–50)
ANION GAP SERPL CALC-SCNC: 11 MMOL/L (ref 8–16)
ANISOCYTOSIS BLD QL SMEAR: SLIGHT
APTT PPP: 33.8 SEC (ref 21–32)
APTT PPP: 36.3 SEC (ref 21–32)
AST SERPL-CCNC: 49 U/L (ref 10–40)
BASO STIPL BLD QL SMEAR: ABNORMAL
BASOPHILS # BLD AUTO: 0.02 K/UL (ref 0–0.2)
BASOPHILS NFR BLD: 0.5 % (ref 0–1.9)
BILIRUB SERPL-MCNC: 8.6 MG/DL (ref 0.1–1)
BUN SERPL-MCNC: 10 MG/DL (ref 6–20)
BURR CELLS BLD QL SMEAR: ABNORMAL
CALCIUM SERPL-MCNC: 7.8 MG/DL (ref 8.7–10.5)
CHLORIDE SERPL-SCNC: 113 MMOL/L (ref 95–110)
CO2 SERPL-SCNC: 25 MMOL/L (ref 23–29)
CREAT SERPL-MCNC: 0.4 MG/DL (ref 0.5–1.4)
DELSYS: ABNORMAL
DIFFERENTIAL METHOD: ABNORMAL
EOSINOPHIL # BLD AUTO: 0.1 K/UL (ref 0–0.5)
EOSINOPHIL NFR BLD: 1.8 % (ref 0–8)
ERYTHROCYTE [DISTWIDTH] IN BLOOD BY AUTOMATED COUNT: 22.5 % (ref 11.5–14.5)
EST. GFR  (NO RACE VARIABLE): >60 ML/MIN/1.73 M^2
FLOW: 12
GLUCOSE SERPL-MCNC: 129 MG/DL (ref 70–110)
GLUCOSE SERPL-MCNC: 91 MG/DL (ref 70–110)
HCO3 UR-SCNC: 17.8 MMOL/L (ref 24–28)
HCO3 UR-SCNC: 22.2 MMOL/L (ref 24–28)
HCT VFR BLD AUTO: 26.3 % (ref 37–48.5)
HCT VFR BLD CALC: <15 %PCV (ref 36–54)
HGB BLD-MCNC: 8.5 G/DL (ref 12–16)
IMM GRANULOCYTES # BLD AUTO: 0.08 K/UL (ref 0–0.04)
IMM GRANULOCYTES NFR BLD AUTO: 2.1 % (ref 0–0.5)
INR PPP: 1.8 (ref 0.8–1.2)
LYMPHOCYTES # BLD AUTO: 0.6 K/UL (ref 1–4.8)
LYMPHOCYTES NFR BLD: 16.7 % (ref 18–48)
MAGNESIUM SERPL-MCNC: 1.7 MG/DL (ref 1.6–2.6)
MAGNESIUM SERPL-MCNC: 2 MG/DL (ref 1.6–2.6)
MCH RBC QN AUTO: 29.2 PG (ref 27–31)
MCHC RBC AUTO-ENTMCNC: 32.3 G/DL (ref 32–36)
MCV RBC AUTO: 90 FL (ref 82–98)
MODE: ABNORMAL
MONOCYTES # BLD AUTO: 0.3 K/UL (ref 0.3–1)
MONOCYTES NFR BLD: 8.4 % (ref 4–15)
NEUTROPHILS # BLD AUTO: 2.7 K/UL (ref 1.8–7.7)
NEUTROPHILS NFR BLD: 70.5 % (ref 38–73)
NRBC BLD-RTO: 0 /100 WBC
PCO2 BLDA: 32.5 MMHG (ref 35–45)
PCO2 BLDA: 33.1 MMHG (ref 35–45)
PH SMN: 7.35 [PH] (ref 7.35–7.45)
PH SMN: 7.43 [PH] (ref 7.35–7.45)
PHOSPHATE SERPL-MCNC: 1.8 MG/DL (ref 2.7–4.5)
PHOSPHATE SERPL-MCNC: 2 MG/DL (ref 2.7–4.5)
PLATELET # BLD AUTO: 34 K/UL (ref 150–450)
PLATELET BLD QL SMEAR: ABNORMAL
PMV BLD AUTO: 10.3 FL (ref 9.2–12.9)
PO2 BLDA: 254 MMHG (ref 80–100)
PO2 BLDA: 41 MMHG (ref 40–60)
POC BE: -2 MMOL/L
POC BE: -8 MMOL/L
POC IONIZED CALCIUM: 0.91 MMOL/L (ref 1.06–1.42)
POC SATURATED O2: 100 % (ref 95–100)
POC SATURATED O2: 78 % (ref 95–100)
POC TCO2: 19 MMOL/L (ref 23–27)
POC TCO2: 23 MMOL/L (ref 24–29)
POIKILOCYTOSIS BLD QL SMEAR: ABNORMAL
POLYCHROMASIA BLD QL SMEAR: ABNORMAL
POTASSIUM BLD-SCNC: 3.6 MMOL/L (ref 3.5–5.1)
POTASSIUM SERPL-SCNC: 3 MMOL/L (ref 3.5–5.1)
POTASSIUM SERPL-SCNC: 3.5 MMOL/L (ref 3.5–5.1)
PROT SERPL-MCNC: 4.8 G/DL (ref 6–8.4)
PROTHROMBIN TIME: 18.6 SEC (ref 9–12.5)
RBC # BLD AUTO: 2.91 M/UL (ref 4–5.4)
SAMPLE: ABNORMAL
SAMPLE: ABNORMAL
SITE: ABNORMAL
SODIUM BLD-SCNC: 145 MMOL/L (ref 136–145)
SODIUM SERPL-SCNC: 149 MMOL/L (ref 136–145)
TOXIC GRANULES BLD QL SMEAR: PRESENT
WBC # BLD AUTO: 3.83 K/UL (ref 3.9–12.7)

## 2023-06-21 PROCEDURE — 25000003 PHARM REV CODE 250: Performed by: NURSE PRACTITIONER

## 2023-06-21 PROCEDURE — 63600175 PHARM REV CODE 636 W HCPCS

## 2023-06-21 PROCEDURE — 85730 THROMBOPLASTIN TIME PARTIAL: CPT | Mod: 91 | Performed by: PSYCHIATRY & NEUROLOGY

## 2023-06-21 PROCEDURE — 94761 N-INVAS EAR/PLS OXIMETRY MLT: CPT

## 2023-06-21 PROCEDURE — 25000003 PHARM REV CODE 250: Performed by: PSYCHIATRY & NEUROLOGY

## 2023-06-21 PROCEDURE — 25000003 PHARM REV CODE 250: Performed by: STUDENT IN AN ORGANIZED HEALTH CARE EDUCATION/TRAINING PROGRAM

## 2023-06-21 PROCEDURE — 83735 ASSAY OF MAGNESIUM: CPT | Mod: 91 | Performed by: PSYCHIATRY & NEUROLOGY

## 2023-06-21 PROCEDURE — 97112 NEUROMUSCULAR REEDUCATION: CPT

## 2023-06-21 PROCEDURE — 99900035 HC TECH TIME PER 15 MIN (STAT)

## 2023-06-21 PROCEDURE — 99291 PR CRITICAL CARE, E/M 30-74 MINUTES: ICD-10-PCS | Mod: ,,, | Performed by: PSYCHIATRY & NEUROLOGY

## 2023-06-21 PROCEDURE — 25000003 PHARM REV CODE 250

## 2023-06-21 PROCEDURE — 85610 PROTHROMBIN TIME: CPT | Performed by: PSYCHIATRY & NEUROLOGY

## 2023-06-21 PROCEDURE — 84100 ASSAY OF PHOSPHORUS: CPT

## 2023-06-21 PROCEDURE — 80053 COMPREHEN METABOLIC PANEL: CPT

## 2023-06-21 PROCEDURE — 84100 ASSAY OF PHOSPHORUS: CPT | Mod: 91 | Performed by: PSYCHIATRY & NEUROLOGY

## 2023-06-21 PROCEDURE — 84132 ASSAY OF SERUM POTASSIUM: CPT | Performed by: PSYCHIATRY & NEUROLOGY

## 2023-06-21 PROCEDURE — 97168 OT RE-EVAL EST PLAN CARE: CPT

## 2023-06-21 PROCEDURE — 97530 THERAPEUTIC ACTIVITIES: CPT

## 2023-06-21 PROCEDURE — 63600175 PHARM REV CODE 636 W HCPCS: Performed by: NURSE PRACTITIONER

## 2023-06-21 PROCEDURE — 63600175 PHARM REV CODE 636 W HCPCS: Performed by: STUDENT IN AN ORGANIZED HEALTH CARE EDUCATION/TRAINING PROGRAM

## 2023-06-21 PROCEDURE — 25000242 PHARM REV CODE 250 ALT 637 W/ HCPCS: Performed by: PSYCHIATRY & NEUROLOGY

## 2023-06-21 PROCEDURE — C9113 INJ PANTOPRAZOLE SODIUM, VIA: HCPCS | Performed by: STUDENT IN AN ORGANIZED HEALTH CARE EDUCATION/TRAINING PROGRAM

## 2023-06-21 PROCEDURE — 85025 COMPLETE CBC W/AUTO DIFF WBC: CPT | Performed by: PSYCHIATRY & NEUROLOGY

## 2023-06-21 PROCEDURE — 83735 ASSAY OF MAGNESIUM: CPT

## 2023-06-21 PROCEDURE — 20000000 HC ICU ROOM

## 2023-06-21 PROCEDURE — 94668 MNPJ CHEST WALL SBSQ: CPT

## 2023-06-21 PROCEDURE — 82803 BLOOD GASES ANY COMBINATION: CPT

## 2023-06-21 PROCEDURE — 85730 THROMBOPLASTIN TIME PARTIAL: CPT | Performed by: PSYCHIATRY & NEUROLOGY

## 2023-06-21 PROCEDURE — 99291 CRITICAL CARE FIRST HOUR: CPT | Mod: ,,, | Performed by: PSYCHIATRY & NEUROLOGY

## 2023-06-21 PROCEDURE — 27000221 HC OXYGEN, UP TO 24 HOURS

## 2023-06-21 PROCEDURE — 63600175 PHARM REV CODE 636 W HCPCS: Performed by: PSYCHIATRY & NEUROLOGY

## 2023-06-21 PROCEDURE — 92526 ORAL FUNCTION THERAPY: CPT

## 2023-06-21 PROCEDURE — 94640 AIRWAY INHALATION TREATMENT: CPT

## 2023-06-21 PROCEDURE — 51798 US URINE CAPACITY MEASURE: CPT

## 2023-06-21 PROCEDURE — 82140 ASSAY OF AMMONIA: CPT | Performed by: NURSE PRACTITIONER

## 2023-06-21 PROCEDURE — 97164 PT RE-EVAL EST PLAN CARE: CPT

## 2023-06-21 PROCEDURE — 51701 INSERT BLADDER CATHETER: CPT

## 2023-06-21 RX ORDER — SPIRONOLACTONE 25 MG/1
25 TABLET ORAL ONCE
Status: COMPLETED | OUTPATIENT
Start: 2023-06-21 | End: 2023-06-21

## 2023-06-21 RX ORDER — FUROSEMIDE 10 MG/ML
20 INJECTION INTRAMUSCULAR; INTRAVENOUS ONCE
Status: COMPLETED | OUTPATIENT
Start: 2023-06-21 | End: 2023-06-21

## 2023-06-21 RX ORDER — MUPIROCIN 20 MG/G
OINTMENT TOPICAL DAILY
Status: COMPLETED | OUTPATIENT
Start: 2023-06-21 | End: 2023-06-30

## 2023-06-21 RX ORDER — SPIRONOLACTONE 25 MG/1
50 TABLET ORAL DAILY
Status: DISCONTINUED | OUTPATIENT
Start: 2023-06-22 | End: 2023-06-22

## 2023-06-21 RX ORDER — PANTOPRAZOLE SODIUM 40 MG/10ML
40 INJECTION, POWDER, LYOPHILIZED, FOR SOLUTION INTRAVENOUS DAILY
Status: CANCELLED | OUTPATIENT
Start: 2023-06-22

## 2023-06-21 RX ADMIN — OLANZAPINE 5 MG: 2.5 TABLET, FILM COATED ORAL at 10:06

## 2023-06-21 RX ADMIN — ACETYLCYSTEINE 4 ML: 100 INHALANT RESPIRATORY (INHALATION) at 09:06

## 2023-06-21 RX ADMIN — FUROSEMIDE 20 MG: 10 INJECTION INTRAMUSCULAR; INTRAVENOUS at 12:06

## 2023-06-21 RX ADMIN — LEVETIRACETAM 500 MG: 100 INJECTION, SOLUTION INTRAVENOUS at 08:06

## 2023-06-21 RX ADMIN — OXYCODONE HYDROCHLORIDE 5 MG: 5 TABLET ORAL at 05:06

## 2023-06-21 RX ADMIN — PANTOPRAZOLE SODIUM 40 MG: 40 INJECTION, POWDER, FOR SOLUTION INTRAVENOUS at 08:06

## 2023-06-21 RX ADMIN — RIFAXIMIN 550 MG: 550 TABLET ORAL at 08:06

## 2023-06-21 RX ADMIN — ACETYLCYSTEINE 4 ML: 100 INHALANT RESPIRATORY (INHALATION) at 05:06

## 2023-06-21 RX ADMIN — TRAZODONE HYDROCHLORIDE 25 MG: 50 TABLET ORAL at 08:06

## 2023-06-21 RX ADMIN — ACETYLCYSTEINE 4 ML: 100 INHALANT RESPIRATORY (INHALATION) at 04:06

## 2023-06-21 RX ADMIN — SILODOSIN 4 MG: 4 CAPSULE ORAL at 08:06

## 2023-06-21 RX ADMIN — SPIRONOLACTONE 25 MG: 25 TABLET, FILM COATED ORAL at 08:06

## 2023-06-21 RX ADMIN — POTASSIUM CHLORIDE 10 MEQ: 7.46 INJECTION, SOLUTION INTRAVENOUS at 04:06

## 2023-06-21 RX ADMIN — SODIUM PHOSPHATE, MONOBASIC, MONOHYDRATE AND SODIUM PHOSPHATE, DIBASIC, ANHYDROUS 20.01 MMOL: 142; 276 INJECTION, SOLUTION INTRAVENOUS at 01:06

## 2023-06-21 RX ADMIN — SODIUM PHOSPHATE, MONOBASIC, MONOHYDRATE AND SODIUM PHOSPHATE, DIBASIC, ANHYDROUS 20.01 MMOL: 142; 276 INJECTION, SOLUTION INTRAVENOUS at 05:06

## 2023-06-21 RX ADMIN — SPIRONOLACTONE 25 MG: 25 TABLET, FILM COATED ORAL at 11:06

## 2023-06-21 RX ADMIN — IPRATROPIUM BROMIDE AND ALBUTEROL SULFATE 3 ML: 2.5; .5 SOLUTION RESPIRATORY (INHALATION) at 09:06

## 2023-06-21 RX ADMIN — POTASSIUM CHLORIDE 10 MEQ: 7.46 INJECTION, SOLUTION INTRAVENOUS at 06:06

## 2023-06-21 RX ADMIN — VANCOMYCIN HYDROCHLORIDE 1000 MG: 1 INJECTION, POWDER, LYOPHILIZED, FOR SOLUTION INTRAVENOUS at 12:06

## 2023-06-21 RX ADMIN — OLANZAPINE 5 MG: 2.5 TABLET, FILM COATED ORAL at 08:06

## 2023-06-21 RX ADMIN — ARIPIPRAZOLE 5 MG: 5 TABLET ORAL at 08:06

## 2023-06-21 RX ADMIN — IPRATROPIUM BROMIDE AND ALBUTEROL SULFATE 3 ML: 2.5; .5 SOLUTION RESPIRATORY (INHALATION) at 05:06

## 2023-06-21 RX ADMIN — POTASSIUM CHLORIDE 10 MEQ: 7.46 INJECTION, SOLUTION INTRAVENOUS at 03:06

## 2023-06-21 RX ADMIN — MUPIROCIN: 20 OINTMENT TOPICAL at 09:06

## 2023-06-21 RX ADMIN — POTASSIUM CHLORIDE 10 MEQ: 7.46 INJECTION, SOLUTION INTRAVENOUS at 05:06

## 2023-06-21 RX ADMIN — MAGNESIUM SULFATE 2 G: 2 INJECTION INTRAVENOUS at 01:06

## 2023-06-21 RX ADMIN — OXYCODONE HYDROCHLORIDE 5 MG: 5 TABLET ORAL at 11:06

## 2023-06-21 RX ADMIN — IPRATROPIUM BROMIDE AND ALBUTEROL SULFATE 3 ML: 2.5; .5 SOLUTION RESPIRATORY (INHALATION) at 04:06

## 2023-06-21 RX ADMIN — DEXTROSE MONOHYDRATE 500 ML: 50 INJECTION, SOLUTION INTRAVENOUS at 11:06

## 2023-06-21 RX ADMIN — POTASSIUM CHLORIDE 40 MEQ: 7.46 INJECTION, SOLUTION INTRAVENOUS at 08:06

## 2023-06-21 NOTE — SUBJECTIVE & OBJECTIVE
Review of Systems: Review of Systems   Musculoskeletal:         Right arm pain   Neurological:  Positive for weakness.        Confusion   Limited ROS due to mental status.     Vitals:   Temp: 98.4 °F (36.9 °C)  Pulse: (!) 111  Rhythm: sinus tachycardia  BP: (!) 189/81  MAP (mmHg): 116  Resp: 18  SpO2: (!) 91 %    Temp  Min: 97.4 °F (36.3 °C)  Max: 98.4 °F (36.9 °C)  Pulse  Min: 86  Max: 118  BP  Min: 147/66  Max: 189/81  MAP (mmHg)  Min: 95  Max: 128  Resp  Min: 13  Max: 25  SpO2  Min: 86 %  Max: 98 %    06/20 0701 - 06/21 0700  In: 2095 [I.V.:49.7]  Out: 960 [Urine:900]   Unmeasured Output  Urine Occurrence: 1  Stool Occurrence: 1  Emesis Occurrence: 0  Pad Count: 1     Examination:   Constitutional: Jaundiced. No apparent distress.   Eyes: + Scleral icterus. Lids no lesions.  Head/Ears/Nose/Mouth/Throat/Neck: Moist mucous membranes.   External ears, nose atraumatic. Bruising noted to BUE, worse on RUE.   Cardiovascular: Regular rhythm. BLE pitting edema.  Respiratory: Comfortable respirations. Clear to auscultation.  Gastrointestinal: Soft, nondistended, nontender. + bowel sounds.    Neurologic:  -GCS E 4 V 4 M 6  -Alert. Oriented to person. Follows commands.  -Cranial nerves: EOM intact, PERRL, no facial droop, + cough  -Motor: Moves BUE spontaneously, antigravity, BLE weakness  -Sensation: Intact to light touch    Medications:   Continuous Scheduledacetylcysteine 100 mg/ml (10%), 4 mL, Q8H  albuterol-ipratropium, 3 mL, Q8H  ARIPiprazole, 5 mg, Daily  levETIRAcetam (Keppra) IV (PEDS and ADULTS), 500 mg, Q12H  mupirocin, , Daily  OLANZapine, 5 mg, BID  oxyCODONE, 5 mg, Q6H  pantoprazole, 40 mg, BID  rifAXIMin, 550 mg, BID  silodosin, 4 mg, Daily  [START ON 6/22/2023] spironolactone, 50 mg, Daily  traZODone, 25 mg, QHS    PRNsodium chloride, , Q24H PRN  aluminum-magnesium hydroxide-simethicone, 30 mL, QID PRN  dextrose 10%, 12.5 g, PRN  dextrose 10%, 25 g, PRN  dextrose, 15 g, PRN  dextrose, 30 g, PRN  magnesium  sulfate IVPB, 2 g, PRN  magnesium sulfate IVPB, 4 g, PRN  naloxone, 0.02 mg, PRN  ondansetron, 4 mg, Q8H PRN  potassium chloride, 40 mEq, PRN   And  potassium chloride, 60 mEq, PRN   And  potassium chloride, 80 mEq, PRN  racepinephrine, 0.5 mL, Q4H PRN  simethicone, 1 tablet, QID PRN  sodium chloride 0.9%, 10 mL, Q8H PRN  sodium phosphate IVPB, 15 mmol, PRN  sodium phosphate IVPB, 20.01 mmol, PRN  sodium phosphate IVPB, 30 mmol, PRN       Today I independently reviewed pertinent medications, lines/drains/airways, imaging, cardiology results, laboratory results, microbiology results, notably:     ISTAT:   Recent Labs   Lab 06/21/23  1219   PH 7.434   PCO2 33.1*   PO2 41   POCSATURATED 78*   HCO3 22.2*   BE -2   POCTCO2 23*   SAMPLE VENOUS        Chem:   Recent Labs   Lab 06/21/23  0001 06/21/23  0912 06/21/23  1148   *  --   --    K 3.0*  --   --    *  --   --    CO2 25  --   --    GLU 91  --   --    BUN 10  --   --    CREATININE 0.4*  --   --    CALCIUM 7.8*  --   --    MG 1.7 2.0  --    PHOS 1.8*  --  2.0*   ANIONGAP 11  --   --    PROT 4.8*  --   --    ALBUMIN 2.3*  --   --    BILITOT 8.6*  --   --    ALKPHOS 111  --   --    AST 49*  --   --    ALT 27  --   --        Heme:   Recent Labs   Lab 06/21/23  0001 06/21/23  0137   WBC 3.83*  --    HGB 8.5*  --    HCT 26.3*  --    PLT 34*  --    INR  --  1.8*       Endo:   No results for input(s): POCTGLUCOSE in the last 24 hours.

## 2023-06-21 NOTE — PT/OT/SLP PROGRESS
Speech Language Pathology Treatment    Patient Name:  Marely Hamilton   MRN:  [id number]  Admitting Diagnosis: Non-convulsive status epilepticus    Recommendations:                 General Recommendations:  Dysphagia therapy  Diet recommendations:  Puree, Liquid Diet Level: Nectar Thick   Aspiration Precautions: Feed only when awake/alert, HOB to 90 degrees, Small bites/sips, and Standard aspiration precautions   General Precautions: Standard, aspiration, fall  Communication strategies:  provide increased time to answer    Assessment:     Marely Hamilton is a 58 y.o. female with an SLP diagnosis of Dysphagia.      Subjective     Awake/alert    Pain/Comfort:  Pain Rating 1: 0/10  Pain Rating Post-Intervention 1: 0/10    Respiratory Status: Room air    Objective:     Has the patient been evaluated by SLP for swallowing?   Yes  Keep patient NPO? No     Pt repositioned upright in bed for PO trials. Confusion evident throughout session requiring increased time to answer and delayed or no response throughout session. She tolerated self fed bites of puree x5 and nectar thick liquids x6 oz via cup/straw with timely swallow initiation and no overt s/s of airway compromise. Thin liquids tolerated x2 with coughing noted pos trials. Recommend continue pureed diet/nectar thick liquids at this time.     Goals:   Multidisciplinary Problems       SLP Goals          Problem: SLP    Goal Priority Disciplines Outcome   SLP Goal     SLP Ongoing, Progressing   Description: Goals due 6/26  1.  Pt. Will participate in ongoing assessment of swallow to initiate least restrictive diet                   Multidisciplinary Problems (Resolved)          Problem: SLP    Goal Priority Disciplines Outcome   SLP Goal   (Resolved)     SLP Met                       Plan:     Patient to be seen:  4 x/week   Plan of Care expires:  07/24/23  Plan of Care reviewed with:  patient   SLP Follow-Up:  Yes       Discharge recommendations:  nursing facility,  skilled     Time Tracking:     SLP Treatment Date:   06/21/23  Speech Start Time:  0950  Speech Stop Time:  0959     Speech Total Time (min):  9 min    Billable Minutes: Treatment Swallowing Dysfunction 9    06/21/2023

## 2023-06-21 NOTE — PROGRESS NOTES
Pharmacokinetic Sign-off: IV Vancomycin    Therapy with vancomycin complete and/or consult discontinued by provider.  Pharmacy will sign off, please re-consult as needed.    Rachel Infante, PharmD, BCPS  EXT [phone]

## 2023-06-21 NOTE — PLAN OF CARE
[facility] Care Plan    POC reviewed with Marely Hamilton at 0300. Pt unable to verbalize understanding, intermittently confused. Questions and concerns addressed. No acute events overnight. Pt progressing toward goals. Will continue to monitor. See below and flowsheets for full assessment and VS info.     -critical value of platelets (35)  -potassium, mag, phos replaced  -straight cath x1      Is this a stroke patient? no    Neuro:  Cheryl Coma Scale  Best Eye Response: 4-->(E4) spontaneous  Best Motor Response: 6-->(M6) obeys commands  Best Verbal Response: 4-->(V4) confused  Cheryl Coma Scale Score: 14  Assessment Qualifiers: patient not sedated/intubated, eye obstruction present  Pupil PERRLA: no     24hr Temp:  [97.4 °F (36.3 °C)-97.8 °F (36.6 °C)]     CV:   Rhythm: normal sinus rhythm  BP goals:   SBP < 180  MAP > 65    Resp:      Vent Mode: Spont  Set Rate: 12 BPM  Oxygen Concentration (%): 30  Vt Set: 340 mL  PEEP/CPAP: 5 cmH20  Pressure Support: 10 cmH20    Plan: N/A    GI/:     Diet/Nutrition Received: NPO, other (see comments) (meds in applesauce)  Last Bowel Movement: 06/20/23  Voiding Characteristics: other (see comments) (intermittent straight cath required)    Intake/Output Summary (Last 24 hours) at 6/21/2023 0247  Last data filed at 6/21/2023 0201  Gross per 24 hour   Intake 1842.52 ml   Output 800 ml   Net 1042.52 ml     Unmeasured Output  Urine Occurrence: 1  Stool Occurrence: 1  Emesis Occurrence: 0  Pad Count: 1    Labs/Accuchecks:  Recent Labs   Lab 06/21/23  0001   WBC 3.83*   RBC 2.91*   HGB 8.5*   HCT 26.3*   PLT 34*      Recent Labs   Lab 06/21/23  0001   *   K 3.0*   CO2 25   *   BUN 10   CREATININE 0.4*   ALKPHOS 111   ALT 27   AST 49*   BILITOT 8.6*      Recent Labs   Lab 06/21/23  0137   INR 1.8*   APTT 36.3*    No results for input(s): CPK, CPKMB, TROPONINI, MB in the last 168 hours.    Electrolytes: Electrolytes replaced  Accuchecks:  none    Gtts:      LDA/Wounds:  Lines/Drains/Airways       Drain  Duration                  Rectal Tube 06/03/23 1300 17 days              Peripheral Intravenous Line  Duration                  Peripheral IV - Single Lumen 06/15/23 1229 20 G;1 3/4 in Right Lower leg 5 days         Peripheral IV - Single Lumen 06/21/23 0012 20 G Anterior;Left Upper Arm <1 day         Peripheral IV - Single Lumen 06/21/23 0013 22 G Left;Posterior Wrist <1 day                  Wounds: Yes  Wound care consulted: Yes    Private car

## 2023-06-21 NOTE — PROGRESS NOTES
[facility]  Neurocritical Care  Progress Note    Admit Date: 5/28/2023  Service Date: 06/21/2023  Length of Stay: 24    Subjective:     Chief Complaint: Non-convulsive status epilepticus    History of Present Illness: Marely Hamilton is a 58 year old female with a medical history significant for EtOH induced hepatic cirrhosis, seizure disorder on outpatient LEV and ZNS and bipolar disorder who was [place] [facility] [place] as a transfer from OSH for evaluation of persistent encephalopathy concerning for NCSE vs hepatic encephalopathy. History is obtained from chart as patient is unresponsive and unable to assist. Initially presented to [facility] on 5/18 for encephalopathy and thought to be multifactorial including UTI (Proteus mirabilis s/p CTX course), hypercalcemia 2/2 lithium toxicity (Lithium changed to Abilify per Psych), as well as hepatic encephalopathy secondary to medication non compliance. She was treated with lactulose and rifaximin with no improvement in her mental status. EEG showed generalized slowing as well as triphasic discharges in the left hemisphere. MRI Brain WO was unremarkable for acute neurologic change. Decision was made to transfer patient [place] [facility] higher level of care and ongoing evaluation and management. On arrival, mental status exam has waxed and waned with patient being intermittently verbal and following commands. NH4 48.    NCC is consulted on hospital day 4 for admission after EEG showed frequent left hemispheric electrographic seizures lasting on average 10-30 seconds with prolonged seizures over 1.5 hours also noted. Per chart review, patient home dose of LEV increased from 1000mg to 1500mg BID, ZNS increased to 200mg. Vimpat 150mg BID added per General Neurology (had not been given prior to consult). On initial evaluation, patient is not responsive to voice or pain. Upward gaze noted. Decision made to transfer patient to [facility] for higher level of care and  airway watch.       Hospital Course: 06/02/2023 Tachycardic and hypotensive, transferred for development of mostly right hemispheric status, LOC exam remains poor  Intubated for airway protection, EEG has changed to mostly include triphasics with no definite seizure activity per Dr Boss, propofol stopped and AEDs simplified to keppra 1000mg bid only, wean off pressor, give colloids with crystalloid resuscitation  06/03/2023: remains on persistant significant pressor support despite adequate hydration, problems with third spacing and may have pulmonary hypertension as well, consider trial of stress steroids if hgb stabilizes  06/04/2023: levo down to 0.08mcg, start and advance TFs, vaso at 0.04U, ammonia has been stable, slight rise in tbili, check direct bili, hgb and plts improved, no clear source of bleeding, no source of bleeding on CT abdomen, consider superimposed hemolysis  06/05/2023 NAEON. Neurologic exam continues to improve, following commands in all extremities. Levo 0.02, weaning as able. Vaso discontinued, MAP>65. Daily SBT, monitor and hold TF at midnight for possible extubation tomorrow. Hemolysis work up ongoing, trending CBC. Continue CTX for SBP prophylaxis.   06/06/2023 Awake and following commands. SBT with low tidal volumes. Remains intubated for airway protection at this time with possible extubation tomorrow with continued neurologic improvement. Levo discontinued, maintaining SBP<65. Concern for hemolytic anemia related to autoimmune process vs hepatic dysfunction (elevated reticulocyte count and decreased haptoglobin).  06/07/2023 Rested on AC overnight due to low TV and fatigue. SBT with pressure support 10/5 this am, weaning ventilator as tolerated. Continue tube feeds with goal to optimize nutrition. Ammonia elevated, continue lactulose to encourage bowel movement.   06/08/2023 Failed SBT due to apnea. Some breaths on SIMV. Awake and following commands. Ammonia trending down (44) with  BMs, continue lactulose. Advance nutritional support with goal to increase strength towards extubation. Plt transfusions PRN. Cryo for fibrinogen <100.  06/09/2023 Venous US with R brachial and femoral DVT. Dicussed with Hematology with recommendation to start Argatroban with plts>50. Daily SBT with apnea, maintain on SIMV with backup rate of 10. Hyperammonemia resolved.   06/10/2023 patient is more alert and awake, tolerated SBT. Hematology agreed to switch to heparin for DVT given negative for HIT  06/11/2023   On AM rounds, patient was noted to be hypoxic and tachycardic. Hypoxia resolved with ventilator changes but patient continued to appear uncomfortable. Patient lied flat with noted improvement in oxygenation. Patient became acutely hypotensive, tachycardiac and hypoxic not responsive to ventilator changes. IVF bolus + Burton bump x3 + initiation of vasopressin due to concern for hypovolemic shock. Levophed added due to persistent hypertension. L femoral arterial line and R IJ trialysis placed emergently. STAT Hemoglobin 4.4. Received protamine for heparin reversal, 3u Plts and 3 FFP and 2 pRBC. STAT CTA abdo/pelvis with L retroperitoneal hematoma with + spot sign. IR consulted and patient taken class A for diagnostic angiogram with possible emobolization. Pressors weaned after volume resuscitation. Family updated over phone.   06/12/2023 s/p IR embolization of left lumbar and internal iliac branch foci of arterial extravasation. Hemoglobin stable, continue to trend CBC q8 with transfusion of pRBC for Hg<7. Restart trickle feeds. Albumin and lasix for dieresis. Low threshold for antibiotics given risk of SBP and RP hematoma. Unlikely to tolerate AC, will discuss placement of IVC filter with IR.   06/13/2023 Attempted to wean FiO2 overnight with noted drop of pO2. FiO2 increased to 55% and patient received albumin and was diuresed. Weaning this am with ABGs. Remains on fentanyl 12.5, neurologic exam unchanged. Hg  stable. Post AM rounds, patient with acute increase in O2 needs, STAT CTA chest/abd/pelvis to assess for PE vs further hemorrhage stable and without new findings. Continue current management.   06/14/2023 NAEON. Hg stable at 7.6. Plt 44, transfuse for <50. Pending IVC filter per IR for DVT burden with contraindications to AC.  06/15/2023 s/p IVC filter placement. Daily SBT, complicated by anxiety. Remains on spontaneous mode, 10/5, 40% FiO2 with goal to wean as able.   06/16/2023 Rested on AC overnight. Plts low, received 1 pack overnight. Daily SBT with plan to extubate as able.   06/17/2023 extubated on BiPap yesterday, weaning off FiO2. Plts trended down again, received transfusion overnight, no sign of active bleeding.   06/18/2023 Hg 6.7, receiving 1u pRBC. Plan to wean Bipap to high flow NC.  06/19/2023: down to 4L with sats at 100%, still requiring occasional transfusion, otherwise awake and able to interact appropriately with examiner  06/20/2023: Increasing O2 requirements, CXR with atelectasis, continued pancytopenia.  06/21/2023: Continued hypoxia. Bilirubin trending up. Spironolactone increased, lasix 20mg x 1 ordered. D5W bolus. Ammonia level WNL. ABG and CXR ordered.       Review of Systems: Review of Systems   Musculoskeletal:         Right arm pain   Neurological:  Positive for weakness.        Confusion   Limited ROS due to mental status.     Vitals:   Temp: 98.4 °F (36.9 °C)  Pulse: (!) 111  Rhythm: sinus tachycardia  BP: (!) 189/81  MAP (mmHg): 116  Resp: 18  SpO2: (!) 91 %    Temp  Min: 97.4 °F (36.3 °C)  Max: 98.4 °F (36.9 °C)  Pulse  Min: 86  Max: 118  BP  Min: 147/66  Max: 189/81  MAP (mmHg)  Min: 95  Max: 128  Resp  Min: 13  Max: 25  SpO2  Min: 86 %  Max: 98 %    06/20 0701 - 06/21 0700  In: 2095 [I.V.:49.7]  Out: 960 [Urine:900]   Unmeasured Output  Urine Occurrence: 1  Stool Occurrence: 1  Emesis Occurrence: 0  Pad Count: 1     Examination:   Constitutional: Jaundiced. No apparent distress.    Eyes: + Scleral icterus. Lids no lesions.  Head/Ears/Nose/Mouth/Throat/Neck: Moist mucous membranes.   External ears, nose atraumatic. Bruising noted to BUE, worse on RUE.   Cardiovascular: Regular rhythm. BLE pitting edema.  Respiratory: Comfortable respirations. Clear to auscultation.  Gastrointestinal: Soft, nondistended, nontender. + bowel sounds.    Neurologic:  -GCS E 4 V 4 M 6  -Alert. Oriented to person. Follows commands.  -Cranial nerves: EOM intact, PERRL, no facial droop, + cough  -Motor: Moves BUE spontaneously, antigravity, BLE weakness  -Sensation: Intact to light touch    Medications:   Continuous Scheduledacetylcysteine 100 mg/ml (10%), 4 mL, Q8H  albuterol-ipratropium, 3 mL, Q8H  ARIPiprazole, 5 mg, Daily  levETIRAcetam (Keppra) IV (PEDS and ADULTS), 500 mg, Q12H  mupirocin, , Daily  OLANZapine, 5 mg, BID  oxyCODONE, 5 mg, Q6H  pantoprazole, 40 mg, BID  rifAXIMin, 550 mg, BID  silodosin, 4 mg, Daily  [START ON 6/22/2023] spironolactone, 50 mg, Daily  traZODone, 25 mg, QHS    PRNsodium chloride, , Q24H PRN  aluminum-magnesium hydroxide-simethicone, 30 mL, QID PRN  dextrose 10%, 12.5 g, PRN  dextrose 10%, 25 g, PRN  dextrose, 15 g, PRN  dextrose, 30 g, PRN  magnesium sulfate IVPB, 2 g, PRN  magnesium sulfate IVPB, 4 g, PRN  naloxone, 0.02 mg, PRN  ondansetron, 4 mg, Q8H PRN  potassium chloride, 40 mEq, PRN   And  potassium chloride, 60 mEq, PRN   And  potassium chloride, 80 mEq, PRN  racepinephrine, 0.5 mL, Q4H PRN  simethicone, 1 tablet, QID PRN  sodium chloride 0.9%, 10 mL, Q8H PRN  sodium phosphate IVPB, 15 mmol, PRN  sodium phosphate IVPB, 20.01 mmol, PRN  sodium phosphate IVPB, 30 mmol, PRN       Today I independently reviewed pertinent medications, lines/drains/airways, imaging, cardiology results, laboratory results, microbiology results, notably:     ISTAT:   Recent Labs   Lab 06/21/23  1219   PH 7.434   PCO2 33.1*   PO2 41   POCSATURATED 78*   HCO3 22.2*   BE -2   POCTCO2 23*   SAMPLE  VENOUS        Chem:   Recent Labs   Lab 06/21/23  0001 06/21/23  0912 06/21/23  1148   *  --   --    K 3.0*  --   --    *  --   --    CO2 25  --   --    GLU 91  --   --    BUN 10  --   --    CREATININE 0.4*  --   --    CALCIUM 7.8*  --   --    MG 1.7 2.0  --    PHOS 1.8*  --  2.0*   ANIONGAP 11  --   --    PROT 4.8*  --   --    ALBUMIN 2.3*  --   --    BILITOT 8.6*  --   --    ALKPHOS 111  --   --    AST 49*  --   --    ALT 27  --   --        Heme:   Recent Labs   Lab 06/21/23  0001 06/21/23  0137   WBC 3.83*  --    HGB 8.5*  --    HCT 26.3*  --    PLT 34*  --    INR  --  1.8*       Endo:   No results for input(s): POCTGLUCOSE in the last 24 hours.        Assessment/Plan:     Neuro  * Non-convulsive status epilepticus  58F with EtOH induced hepatic cirrhosis, seizure disorder on outpatient LEV and ZNS and bipolar disorder admitted on 5/28 for persistent encephalopathy.    - UTI on admit (Proteus mirabilis), now s/p CTX course  - Hypercalcemia 2/2 lithium toxicity (Lithium changed to Abilify per Psych)  - Hepatic encephalopathy 2/2 to medication non compliance, treated with lactulose and rifaximin     MRI Brain WO was unremarkable for acute neurologic change  EEG w/ frequent left hemispheric electrographic seizures and NCSE  Repeat EEGs without seizure activity x 24 hours, now resolved    6/11 --> acute hypotension, hypoxia --> hypovolemic shock --> Large R peritoneal hematoma --> IR for class A embo --> s/p IR embolization of left lumbar and internal iliac branch foci of arterial extravasation  6/13 --> episode of hypoxia and SOB --> CTA PE without evidence of PE, CT C/A/P without new bleed    - Continued admission to [facility]  - Q4h neurochecks while in ICU  - Q1h vitals while in ICU  - HOB@30  - Keppra 500mg BID  - Thiamine replacement   - Lactulose, titrate to BM  - MAP>65  - Daily CMP, Mag, Phos - replete electrolytes PRN  - Lipid panel, A1c, Coags reviewed  - Daily CBC, transfuse PRN and replace Plts  <25  - Daily fibrinogen, PT/INR  - Heparin held in setting of acute bleed/DIC, IVC filter in place  - PT/OT/SLP as appropriate  - CM/SW consult for dispo planning    Acute encephalopathy  Multifactorial     Psychiatric  Bipolar 1 disorder  Continue abilifseverino richyprexa    Pulmonary  Atelectasis  Extubated 6/16  Increasing O2 requirements  CXR with atelectasis/ domonique pleural effusions  Pulmonary toileting  Sit up on side of bed or cardiac chair with assistance  Incentive spirometry  Lasix 20mg x 1  D5W bolus    Hematology  Coagulopathy  Pancytopenia, low fibrinogen--> DIC post acute blood loss anemia  Transfuse for plt count < 25k    Acute deep vein thrombosis (DVT) of brachial vein of right upper extremity  Nonocclusive R brachial vein DVT in area of previous IV site, noted on 6/8  Also R IJ DVT noted on 6/19  Argatroban per Heme recs - transitioned to heparin 6/10  HELD 6/11 in setting of retroperitoneal bleed  IVC filter placed 6/14  Unable to anticoagulate due to continued pancytopenia/thrombocytopenia    Deep vein thrombosis (DVT) of femoral vein of right lower extremity  Nonocclusive right proximal femoral vein DVT  Noted on 6/8  Argatroban per Heme recs - transitioned to heparin  HELD 6/11 in setting of hypovolemic shock 2/2 retroperitoneal hemorrhage   IVC filter placed 6/14  Unable to anticoagulate due to continued pancytopenia/thrombocytopenia    Thrombocytopenia  Likely secondary to hepatic cirrhosis and development of splenomegaly  Hematology consulted, tested negative for HIT    6/11 --> acute hypotension, hypoxia --> hypovolemic shock --> Large R peritoneal hematoma --> IR for class A embo --> s/p IR embolization of left lumbar and internal iliac branch foci of arterial extravasation    Plts continue to trending down despite transfusion, however, no active bleeding. Will transfuse if plt < 25, or < 50 with active bleeding    Endocrine  Hyperammonemia  Off lactulose  Ammonia WNL    Severe protein-calorie  malnutrition  Decreased intake due to dysphagia  Diet texture per SLP  Nutrition following    GI  Hepatic cirrhosis  Continue Rifaximin   Ammonia normalized   Follow coags and fibrinogen    Consider re-consult hepatology as stabilizes   Spironolactone increased, monitor K level, replaced prn  Bilirubin trending up    Hepatic encephalopathy  Improving    Other  Retroperitoneal bleed  6/11 --> acute hypotension, hypoxia --> hypovolemic shock --> Large R peritoneal hematoma --> IR for class A embo --> s/p IR embolization of left lumbar and internal iliac branch foci of arterial extravasation    -Repeat CT c/a/p stable        The patient is being Prophylaxed for:  Venous Thromboembolism with: Mechanical  Stress Ulcer with: Not Applicable   Ventilator Pneumonia with: not applicable    Activity Orders          Discontinue arterial line starting at 06/18 1927    Diet NPO: NPO starting at 06/15 0500    Elevate HOB flat until bedrest complete starting at 06/11 1641    Turn patient starting at 05/28 2253    Progressive Mobility Protocol (mobilize patient to their highest level of functioning at least twice daily) starting at 05/28 2000        Full Code    Critical condition in that Patient has a condition that poses threat to life and bodily function:  cirrhosis, hepatic encephalopathy, pancytopenia, DIC, hypoxia, atelectasis, DVT     45 minutes of Critical care time was spent personally by me on the following activities: development of treatment plan with patient or surrogate and bedside caregivers, discussions with consultants, evaluation of patient's response to treatment, examination of patient, ordering and performing treatments and interventions, ordering and review of laboratory studies, ordering and review of radiographic studies, pulse oximetry, antibiotic titration if applicable, vasopressor titration if applicable, re-evaluation of patient's condition. This critical care time did not overlap with that of any other  provider or involve time for any procedures. There is high probability for acute neurological change leading to clinical and possibly life-threatening deterioration requiring highest level of physician preparedness for urgent intervention.    Amy Naylor NP  Neurocritical Care  [facility] Neuro Critical [facility]

## 2023-06-21 NOTE — ASSESSMENT & PLAN NOTE
58F with EtOH induced hepatic cirrhosis, seizure disorder on outpatient LEV and ZNS and bipolar disorder admitted on 5/28 for persistent encephalopathy.    - UTI on admit (Proteus mirabilis), now s/p CTX course  - Hypercalcemia 2/2 lithium toxicity (Lithium changed to Abilify per Psych)  - Hepatic encephalopathy 2/2 to medication non compliance, treated with lactulose and rifaximin     MRI Brain WO was unremarkable for acute neurologic change  EEG w/ frequent left hemispheric electrographic seizures and NCSE  Repeat EEGs without seizure activity x 24 hours, now resolved    6/11 --> acute hypotension, hypoxia --> hypovolemic shock --> Large R peritoneal hematoma --> IR for class A embo --> s/p IR embolization of left lumbar and internal iliac branch foci of arterial extravasation  6/13 --> episode of hypoxia and SOB --> CTA PE without evidence of PE, CT C/A/P without new bleed    - Continued admission to [facility]  - Q4h neurochecks while in ICU  - Q1h vitals while in ICU  - HOB@30  - Keppra 500mg BID  - Thiamine replacement   - Lactulose, titrate to BM  - MAP>65  - Daily CMP, Mag, Phos - replete electrolytes PRN  - Lipid panel, A1c, Coags reviewed  - Daily CBC, transfuse PRN and replace Plts <25  - Daily fibrinogen, PT/INR  - Heparin held in setting of acute bleed/DIC, IVC filter in place  - PT/OT/SLP as appropriate  - CM/SW consult for dispo planning

## 2023-06-21 NOTE — PT/OT/SLP RE-EVAL
Physical Therapy Re-Evaluation and Treatment    Patient Name:  Marely Hamilton   MRN:  [id number]    Recommendations:     Discharge Recommendations: nursing facility, skilled   Discharge Equipment Recommendations: to be determined by next level of care   Barriers to discharge: Increased level of assist and Decreased caregiver support    Assessment:     Marely Hamilton is a 58 y.o. female admitted with a medical diagnosis of Non-convulsive status epilepticus. She presents with the following impairments/functional limitations: weakness, impaired endurance, impaired self care skills, impaired functional mobility, gait instability, impaired balance, impaired cognition, decreased upper extremity function, decreased lower extremity function, decreased safety awareness, abnormal tone. Patient presents for re-evaluation s/p t/f to neuro ICU. Patient tolerated session fairly. While sitting edge of bed pt with increased respiratory rate, spO2 to 88%, and HR to 140s, returned to HOB elevated. Unable to follow commands but attending to visual stimuli.    Rehab Prognosis: Fair; patient would benefit from acute skilled PT services 3 x/week to address these deficits and reach maximum level of function. Patient is most appropriate to go to nursing facility, skilled.  Recent Surgery: Procedure(s) (LRB):  EMBOLIZATION, BLOOD VESSEL (N/A) 10 Days Post-Op    Plan:     During this hospitalization, patient to be seen 3 x/week to address the identified rehab impairments via gait training, therapeutic activities, therapeutic exercises, neuromuscular re-education and progress toward the following goals:    Plan of Care Expires:  07/21/23    Subjective     Chief Complaint: Unable to assess, patient grossly non-verbal   Patient/Family Comments/Goals: Says yes in response to 3 questions  Pain/Comfort:  Pain Rating 1:  (unable to assess, no indicators of pain)    Patients cultural, spiritual, Temple conflicts given the current situation:  no    Objective:     Communicated with nursing prior to session.  Patient found HOB elevated with telemetry, blood pressure cuff, pulse ox (continuous), peripheral IV, bowel management system, oxygen upon PT entry to room.    General Precautions: Standard, aspiration, fall   Orthopedic Precautions:N/A   Braces: N/A     Exams:  Cognitive Exam:  Patient is oriented to unable to assess, patient grossly non-verbal, appears to attempt to follow commands however unable to do so, visually attending to therapist  RLE ROM:  WFL, ankle to neutral with overpressure  RLE Strength:  0/5  LLE ROM: WFL, ankle to neutral dorsiflexion with overpressure however with inversion unable to be corrected  LLE Strength:  0/5 to command but assists with moving LLE in bed, unable to grade specific muscle groups  Sensation: withdraws to noxious stimulus at R great toe but not L  Tone: increased tone noted to B plantarflexors, L>R, no spasticity noted    Functional Mobility:  Bed Mobility:     Rolling Right: total assistance  Supine to Sit: total assistance  Sit to Supine: total assistance  Transfers:  Deferred due to poor sitting balance and poor command following   Balance:   Static Sitting: Poor, able to maintain for 8 minute(s) with contact guard - maximal assistance, demonstrates posterior and R lean requiring cuing to correct, improved after LUE weightbearing  Dynamic Sitting: Poor: Patient unable to accept challenge or move without loss of balance, moderate - maximal assistance    Therapeutic Activities and Exercises:  Patient educated on role of acute care PT and PT POC  Patient is not clear to transfer with RN/PCT  Performed mobility as described above to increase tolerance ot OOB mobility    AM-PAC 6 CLICK MOBILITY  Total Score:6     Patient left HOB elevated with all lines intact, call button in reach, and RN notified.    GOALS:   Multidisciplinary Problems       Physical Therapy Goals          Problem: Physical Therapy    Goal  Priority Disciplines Outcome Goal Variances Interventions   Physical Therapy Goal     PT, PT/OT Ongoing, Progressing     Description: Goals to be met by: 2023     Patient will increase functional independence with mobility by performin. Supine to sit with moderate assistance  2. Sit to supine with moderate assistance  3. Sit to stand transfer with moderate assistance  4. Bed to chair transfer with maximal assistance using LRAD as needed  5. Gait  x 5 feet with maximal assistance using LRAD as needed  6. Sitting at edge of bed x8 minutes with Stand-by Assistance  7. Lower extremity exercise program x10 reps per handout, with independence                        History:     Past Medical History:   Diagnosis Date    Addiction to drug     Alcohol abuse     Alcohol abuse, in remission 6/15/2015    14.5 weeks ago; AA weekly    Anemia     Anxiety 6/15/2015    Behavioral problem     Bipolar disorder     Bipolar disorder in remission 6/15/2015    Cirrhosis, Laennec's 6/15/2015    Depression     Encounter for blood transfusion     Epistaxis 6/15/2015    Fatigue     Glaucoma     Hematuria     Hepatic encephalopathy 6/15/2015    Hepatic enlargement     History of psychiatric care     History of psychiatric hospitalization     History of seizure 6/15/2015    1    hx of intentional Tylenol overdose 6/15/2015    2005- situational and hx of bipolar    Hx of psychiatric care     Macrocytic anemia 2015    6 units PRBC since 2015    Jeana     Osteoarthritis     Other ascites 6/15/2015    Psychiatric exam requested by authority     Psychiatric problem     Psychosis 2019    Renal disorder     Seizures     Self-harming behavior     Suicide attempt     Therapy     Thrombocytopenia 6/15/2015       Past Surgical History:   Procedure Laterality Date    COSMETIC SURGERY      EMBOLIZATION N/A 2023    Procedure: EMBOLIZATION, BLOOD VESSEL;  Surgeon: Debbie Surgeon;  Location: [facility];  Service: Anesthesiology;   Laterality: N/A;    ESOPHAGOGASTRODUODENOSCOPY         Time Tracking:     PT Received On: 06/21/23  PT Start Time: 0831     PT Stop Time: 0848  PT Total Time (min): 17 min     Billable Minutes: Re-Evaluation 8 min Neuromuscular Re-education 8 min    06/21/2023

## 2023-06-21 NOTE — PLAN OF CARE
[facility] Care Plan    POC reviewed with Marely Hamilton and family at 1400. Pt somewhat able to verbalized understanding. Questions and concerns addressed. No acute events today. Pt progressing toward goals. Will continue to monitor. See below and flowsheets for full assessment and VS info.   - Mag replace successfully  - Potassium replaced, recheck sent, waiting for results  - Phos replaced x1, still low, replacing again  - HFNC O2 at 12 liters, pt keeps removing O2 and pulse ox, take pt a while to come back up to goal which is 88 or greater, highest seen is 94. Pt O2 status worsened with sitting on edge of bed with OT and RN  - CXR completed  - VBG completed  - Meds that cuauhtemoc recommended pt is currently on  - Medication starting tonight to assist with sleep. Pt has not slept in 4 days. Stimulate during day time and Delirium precautions at night, Q 4 hour neur checks during day and night.   - D5 500mL bolus given today  - Lasix given today.   - post void residual was 755, straight cath amount was 800  - flexi Dc'd  - Speech passed pt for pureed diet with nectar thick liquid. Diet ordered. Pt will need to be fed. Pt doing well with meds crushed in pudding  - tachy most of the day, 102-135  - SBP < 180 riding the line, No PRNs are ordered. Came back down after relaxing and straight cath  - Sister updated this morning and this evening  - Sister given the number to RN station to ask to talk with Charu to go over plan for when leaving [facility] to SNIF.   - PIV remain in place and in good working order at this time.   - Pt bathed and linens changed            Is this a stroke patient? no    Neuro:  Cheryl Coma Scale  Best Eye Response: 4-->(E4) spontaneous  Best Motor Response: 6-->(M6) obeys commands  Best Verbal Response: 4-->(V4) confused  [place] Coma Scale Score: 14  Assessment Qualifiers: patient not sedated/intubated  Pupil PERRLA: no     24 hr Temp:  [97.4 °F (36.3 °C)-98.5 °F (36.9 °C)]     CV:   Rhythm: sinus  tachycardia  BP goals:   SBP < 180  MAP > 65    Resp:      Vent Mode: Spont  Set Rate: 12 BPM  Oxygen Concentration (%): 30  Vt Set: 340 mL  PEEP/CPAP: 5 cmH20  Pressure Support: 10 cmH20    Plan: N/A    GI/:     Diet/Nutrition Received: NPO  Last Bowel Movement: 06/21/23  Voiding Characteristics: unable to void    Intake/Output Summary (Last 24 hours) at 6/21/2023 1616  Last data filed at 6/21/2023 1500  Gross per 24 hour   Intake 1949.38 ml   Output 1860 ml   Net 89.38 ml     Unmeasured Output  Urine Occurrence: 1  Stool Occurrence: 1  Emesis Occurrence: 0  Pad Count: 2    Labs/Accuchecks:  Recent Labs   Lab 06/21/23  0001   WBC 3.83*   RBC 2.91*   HGB 8.5*   HCT 26.3*   PLT 34*      Recent Labs   Lab 06/21/23  0001   *   K 3.0*   CO2 25   *   BUN 10   CREATININE 0.4*   ALKPHOS 111   ALT 27   AST 49*   BILITOT 8.6*      Recent Labs   Lab 06/21/23  0137 06/21/23  0912   INR 1.8*  --    APTT 36.3* 33.8*    No results for input(s): CPK, CPKMB, TROPONINI, MB in the last 168 hours.    Electrolytes: Electrolytes replaced  Accuchecks: none    Gtts:      LDA/Wounds:  Lines/Drains/Airways       Peripheral Intravenous Line  Duration                  Peripheral IV - Single Lumen 06/15/23 1229 20 G;1 3/4 in Right Lower leg 6 days         Peripheral IV - Single Lumen 06/21/23 0012 20 G Anterior;Left Upper Arm <1 day         Peripheral IV - Single Lumen 06/21/23 0013 22 G Left;Posterior Wrist <1 day                  Wounds: Yes  Wound care consulted: Yes   Problem: Adult Inpatient Plan of Care  Goal: Plan of Care Review  6/21/2023 1614 by Anastasiya Cheng RN  Outcome: Ongoing, Progressing  6/21/2023 1611 by Anastasiya Cheng RN  Outcome: Ongoing, Progressing  Goal: Patient-Specific Goal (Individualized)  Description: Admit Date: 6/1/23    Admit Dx: NCSE    Past Medical History:  No date: Addiction to drug  No date: Alcohol abuse  6/15/2015: Alcohol abuse, in remission      Comment:  14.5 weeks ago; AA weekly  No date:  Anemia  6/15/2015: Anxiety  No date: Behavioral problem  No date: Bipolar disorder  6/15/2015: Bipolar disorder in remission  6/15/2015: Cirrhosis, Laennec's  No date: Depression  No date: Encounter for blood transfusion  6/15/2015: Epistaxis  No date: Fatigue  No date: Glaucoma  No date: Hematuria  6/15/2015: Hepatic encephalopathy  No date: Hepatic enlargement  No date: History of psychiatric care  No date: History of psychiatric hospitalization  6/15/2015: History of seizure      Comment:  1  6/15/2015: hx of intentional Tylenol overdose      Comment:  2005- situational and hx of bipolar  No date: Hx of psychiatric care  9/18/2015: Macrocytic anemia      Comment:  6 units PRBC since June 2015  No date: Jeana  No date: Osteoarthritis  6/15/2015: Other ascites  No date: Psychiatric exam requested by authority  No date: Psychiatric problem  9/26/2019: Psychosis  No date: Renal disorder  No date: Seizures  No date: Self-harming behavior  No date: Suicide attempt  No date: Therapy  6/15/2015: Thrombocytopenia    Past Surgical History:  No date: COSMETIC SURGERY  No date: ESOPHAGOGASTRODUODENOSCOPY    Individualization:   1. Patient likes to listen to music.           6/21/2023 1614 by Anastasiya Cheng RN  Outcome: Ongoing, Progressing  6/21/2023 1611 by Anastasiya Cheng RN  Outcome: Ongoing, Progressing  Goal: Absence of Hospital-Acquired Illness or Injury  6/21/2023 1614 by Anastasiya Cheng RN  Outcome: Ongoing, Progressing  6/21/2023 1611 by Anastasiya Cheng RN  Outcome: Ongoing, Progressing  Goal: Optimal Comfort and Wellbeing  6/21/2023 1614 by Anastasiya Cheng RN  Outcome: Ongoing, Progressing  6/21/2023 1611 by Anastasiya Cheng RN  Outcome: Ongoing, Progressing     Problem: Fluid and Electrolyte Imbalance (Acute Kidney Injury/Impairment)  Goal: Fluid and Electrolyte Balance  6/21/2023 1614 by Anastasiya Cheng RN  Outcome: Unable to Meet, Plan Revised  6/21/2023 1611 by Anastasiya Cheng RN  Outcome: Ongoing, Progressing      Problem: Oral Intake Inadequate (Acute Kidney Injury/Impairment)  Goal: Optimal Nutrition Intake  6/21/2023 1614 by Anastasiya Cheng RN  Outcome: Ongoing, Progressing  6/21/2023 1611 by Anastasiya Cheng RN  Outcome: Ongoing, Progressing     Problem: Renal Function Impairment (Acute Kidney Injury/Impairment)  Goal: Effective Renal Function  6/21/2023 1614 by Anastasiya Cheng RN  Outcome: Ongoing, Progressing  6/21/2023 1611 by Anastasiya Cheng RN  Outcome: Ongoing, Progressing     Problem: Infection  Goal: Absence of Infection Signs and Symptoms  6/21/2023 1614 by Anastasiya Cheng RN  Outcome: Ongoing, Progressing  6/21/2023 1611 by Anastasiya Cheng RN  Outcome: Ongoing, Progressing     Problem: Skin Injury Risk Increased  Goal: Skin Health and Integrity  6/21/2023 1614 by Anastasiya Cheng RN  Outcome: Ongoing, Progressing  6/21/2023 1611 by Anastasiya Cheng RN  Outcome: Ongoing, Progressing     Problem: Fall Injury Risk  Goal: Absence of Fall and Fall-Related Injury  6/21/2023 1614 by Anastasiya Cheng RN  Outcome: Ongoing, Progressing  6/21/2023 1611 by Anastasiya Cheng RN  Outcome: Ongoing, Progressing     Problem: Nutrition Impairment (Mechanical Ventilation, Invasive)  Goal: Optimal Nutrition Delivery  Outcome: Met     Problem: Skin and Tissue Injury (Mechanical Ventilation, Invasive)  Goal: Absence of Device-Related Skin and Tissue Injury  Outcome: Met     Problem: Ventilator-Induced Lung Injury (Mechanical Ventilation, Invasive)  Goal: Absence of Ventilator-Induced Lung Injury  Outcome: Met     Problem: Communication Impairment (Artificial Airway)  Goal: Effective Communication  Outcome: Met     Problem: Device-Related Complication Risk (Artificial Airway)  Goal: Optimal Device Function  Outcome: Met     Problem: Skin and Tissue Injury (Artificial Airway)  Goal: Absence of Device-Related Skin or Tissue Injury  Outcome: Met     Problem: Impaired Wound Healing  Goal: Optimal Wound Healing  6/21/2023 1614 by Anastasiya Cheng  RN  Outcome: Ongoing, Progressing  6/21/2023 1611 by Anastasiya Cheng RN  Outcome: Ongoing, Progressing     Problem: Adjustment to Illness (Delirium)  Goal: Optimal Coping  6/21/2023 1614 by Anastasiya Cheng RN  Outcome: Unable to Meet, Plan Revised  6/21/2023 1611 by Anastasiya Cheng RN  Outcome: Unable to Meet, Plan Revised     Problem: Altered Behavior (Delirium)  Goal: Improved Behavioral Control  6/21/2023 1614 by Anastasiya Cheng RN  Outcome: Unable to Meet, Plan Revised  6/21/2023 1611 by Anastasiya Cheng RN  Outcome: Unable to Meet, Plan Revised     Problem: Attention and Thought Clarity Impairment (Delirium)  Goal: Improved Attention and Thought Clarity  6/21/2023 1614 by Anastasiya Cheng RN  Outcome: Unable to Meet, Plan Revised  6/21/2023 1611 by Anastasiya Cheng RN  Outcome: Unable to Meet, Plan Revised     Problem: Sleep Disturbance (Delirium)  Goal: Improved Sleep  6/21/2023 1614 by Anastasiya Cheng RN  Outcome: Unable to Meet, Plan Revised  6/21/2023 1611 by Anastasiya Cheng RN  Outcome: Unable to Meet, Plan Revised

## 2023-06-21 NOTE — ASSESSMENT & PLAN NOTE
Extubated 6/16  Increasing O2 requirements  CXR with atelectasis/ domonique pleural effusions  Pulmonary toileting  Sit up on side of bed or cardiac chair with assistance  Incentive spirometry  Lasix 20mg x 1  D5W bolus

## 2023-06-21 NOTE — PLAN OF CARE
PT re-evaluation completed, goals reviewed and remain appropriate    Problem: Physical Therapy  Goal: Physical Therapy Goal  Description: Goals to be met by: 2023     Patient will increase functional independence with mobility by performin. Supine to sit with moderate assistance  2. Sit to supine with moderate assistance  3. Sit to stand transfer with moderate assistance  4. Bed to chair transfer with maximal assistance using LRAD as needed  5. Gait  x 5 feet with maximal assistance using LRAD as needed  6. Sitting at edge of bed x8 minutes with Stand-by Assistance  7. Lower extremity exercise program x10 reps per handout, with independence   Outcome: Ongoing, Progressing

## 2023-06-21 NOTE — PLAN OF CARE
06/21/23 1531   Post-Acute Status   Post-Acute Authorization Placement   Post-Acute Placement Status Referrals Sent   Coverage [employer]   Discharge Plan   Discharge Plan A Skilled Nursing Facility     PT/OT requiring SNF upon d/c.  SW spoke with patient's sister to review discharge recommendation of SNF and is agreeable to plan. Sister will provide the other three selection of facilities on tomorrow 6/22/2023. Sister's plan is for pt to go into SNF then [place]once SNF is completed.    Provided list of facilities in-network with patient's payor plan. Notified that referral sent to below listed facilities from in-network list based on proximity to home/family support:   1.[facility] Phone: [phone]    2.  3.  4.  5. (can send more than 5)    Sister instructed to identify preference.    Preferred Facility: (if more than 1, listed in order of descending preference)  1.[facility] Phone: [phone]      If an additional preferred facility not listed above is identified, additional referral to be sent. If above facilities unable to accept, will send additional referrals to in-network providers.      Danyell Palafox, VAN  PRN - [facility]  [id number]

## 2023-06-21 NOTE — PT/OT/SLP RE-EVAL
Occupational Therapy   Re-evaluation    Name: Marely Hamilton  MRN: [id number]  Admitting Diagnosis:  Non-convulsive status epilepticus  Recent Surgery: Procedure(s) (LRB):  EMBOLIZATION, BLOOD VESSEL (N/A) 10 Days Post-Op    Recommendations:     Discharge Recommendations: nursing facility, skilled  Discharge Equipment Recommendations: to be determined by next level of care  Barriers to discharge:  Decreased caregiver support    Assessment:     Marley Hamilton is a 58 y.o. female with a medical diagnosis of Non-convulsive status epilepticus.  She presents with decreased independence with ADL's.  Performance deficits affecting function are weakness, impaired endurance, impaired self care skills, impaired functional mobility, impaired balance, impaired cognition, decreased coordination, decreased upper extremity function, decreased lower extremity function, decreased safety awareness, impaired skin.      Rehab Prognosis:  fair; patient would benefit from acute skilled OT services to address these deficits and reach maximum level of function.       Plan:     Patient to be seen 3 x/week to address the above listed problems via self-care/home management, therapeutic activities, therapeutic exercises, neuromuscular re-education, cognitive retraining  Plan of Care Expires: 07/21/23  Plan of Care Reviewed with: patient    Subjective     Chief Complaint: being dizzy  Patient/Family stated goals: pt reported being dizzy while seated EOB  Communicated with: RN prior to session.  Pain/Comfort:  Pain Rating 1: 0/10  Pain Rating Post-Intervention 1: 0/10    Objective:     Communicated with: RN prior to session.  Patient found supine with: peripheral IV, blood pressure cuff, pulse ox (continuous), telemetry, bowel management system, oxygen (no family present; RN present throughout session) upon OT entry to room.    General Precautions: Standard, fall  Orthopedic Precautions: N/A  Braces: N/A    Occupational Performance:    Bed  Mobility:    Patient completed Scooting/Bridging with total assistance scooting forward on EOB & up HOB while supine  Patient completed Supine to Sit with maximal assistance  Patient completed Sit to Supine with total assistance    Activities of Daily Living:  Feeding:  moderate assistance while seated in bed with HOB elevated (RN assisting pt with eating applesauce during session)  Grooming: stand by assistance wiping face while seated with HOB elevated in bed    Cognitive/Visual Perceptual:  Cognitive/Psychosocial Skills:     -       Oriented to: Person and year   -       Follows Commands/attention:Inattentive and Easily distracted  -       Communication: intermittent verbalizations  -       Safety awareness/insight to disability: impaired     Physical Exam:  Upper Extremity Range of Motion:  AAROM BUE WFL  Upper Extremity Strength: JAMIL accurately due to inconsistent performance by pt   Strength: good BUE  Pt with noted tremors with RUE during activities.  Pt with decreased FMC BUE.    AMPA 6 Click:  AMPA Total Score: 8    Treatment & Education:  Pt required CGA-Min (A) for postural control while seated EOB.  Provided verbal & physical cues to facilitate postural control. RN approved session stating MD wanted to have pt EOB to assess breathing while EOB.  Pt's O2 sats 82-86% while seated EOB & 86-89% while supine with HOB elevated.  Provided frequent cues for deep breathing techniques while seated EOB.  Provided BUE support on pillows at end of session for edema management & shoulder support.  Provided education regarding role of OT, POC, & discharge recommendations with pt verbalizing understanding.  Pt had no further questions & when asked whether there were any concerns pt reported none.        Patient left HOB elevated with all lines intact, call button in reach, RN notified, and RN present    GOALS:   Multidisciplinary Problems       Occupational Therapy Goals          Problem: Occupational Therapy     Goal Priority Disciplines Outcome Interventions   Occupational Therapy Goal     OT, PT/OT Ongoing, Progressing    Description: Goals to be met by: 7/26/23    Patient will increase functional independence with ADLs by performing:    Grooming while EOB with Moderate Assistance.  Sitting at edge of bed x10 minutes with Minimal Assistance.  Rolling to Bilateral with Minimal Assistance.   Supine to sit with Moderate Assistance.  UE dressing with Moderate assistance  Toilet transfer to bedside commode with Moderate Assistance.                         History:     Past Medical History:   Diagnosis Date    Addiction to drug     Alcohol abuse     Alcohol abuse, in remission 6/15/2015    14.5 weeks ago; AA weekly    Anemia     Anxiety 6/15/2015    Behavioral problem     Bipolar disorder     Bipolar disorder in remission 6/15/2015    Cirrhosis, Laennec's 6/15/2015    Depression     Encounter for blood transfusion     Epistaxis 6/15/2015    Fatigue     Glaucoma     Hematuria     Hepatic encephalopathy 6/15/2015    Hepatic enlargement     History of psychiatric care     History of psychiatric hospitalization     History of seizure 6/15/2015    1    hx of intentional Tylenol overdose 6/15/2015    2005- situational and hx of bipolar    Hx of psychiatric care     Macrocytic anemia 9/18/2015    6 units PRBC since June 2015    Jeana     Osteoarthritis     Other ascites 6/15/2015    Psychiatric exam requested by authority     Psychiatric problem     Psychosis 9/26/2019    Renal disorder     Seizures     Self-harming behavior     Suicide attempt     Therapy     Thrombocytopenia 6/15/2015         Past Surgical History:   Procedure Laterality Date    COSMETIC SURGERY      EMBOLIZATION N/A 6/11/2023    Procedure: EMBOLIZATION, BLOOD VESSEL;  Surgeon: Debbie Surgeon;  Location: [facility];  Service: Anesthesiology;  Laterality: N/A;    ESOPHAGOGASTRODUODENOSCOPY         Time Tracking:     OT Date of Treatment: 06/21/23  OT Start Time:  1119  OT Stop Time: 1141  OT Total Time (min): 22 min    Billable Minutes:Re-eval 12  Therapeutic Activity 10    6/21/2023

## 2023-06-21 NOTE — PLAN OF CARE
Problem: Occupational Therapy  Goal: Occupational Therapy Goal  Description: Goals to be met by: 7/26/23    Patient will increase functional independence with ADLs by performing:    Grooming while EOB with Moderate Assistance.  Sitting at edge of bed x10 minutes with Minimal Assistance.  Rolling to Bilateral with Minimal Assistance.   Supine to sit with Moderate Assistance.  UE dressing with Moderate assistance  Toilet transfer to bedside commode with Moderate Assistance.    Outcome: Ongoing, Progressing     OT re-eval completed.

## 2023-06-22 ENCOUNTER — ANESTHESIA EVENT (OUTPATIENT)
Dept: NEUROLOGY | Facility: HOSPITAL | Age: 58
DRG: 981 | End: 2023-06-22
Payer: MEDICARE

## 2023-06-22 ENCOUNTER — ANESTHESIA (OUTPATIENT)
Dept: NEUROLOGY | Facility: HOSPITAL | Age: 58
DRG: 981 | End: 2023-06-22
Payer: MEDICARE

## 2023-06-22 PROBLEM — S00.31XA ABRASION OF NOSE: Status: ACTIVE | Noted: 2023-06-22

## 2023-06-22 PROBLEM — Z97.8 ENDOTRACHEALLY INTUBATED: Status: ACTIVE | Noted: 2023-06-22

## 2023-06-22 LAB
ALBUMIN SERPL BCP-MCNC: 2.3 G/DL (ref 3.5–5.2)
ALP SERPL-CCNC: 112 U/L (ref 55–135)
ALT SERPL W/O P-5'-P-CCNC: 27 U/L (ref 10–44)
ANION GAP SERPL CALC-SCNC: 10 MMOL/L (ref 8–16)
ANISOCYTOSIS BLD QL SMEAR: ABNORMAL
AST SERPL-CCNC: 44 U/L (ref 10–40)
BASO STIPL BLD QL SMEAR: ABNORMAL
BASOPHILS # BLD AUTO: 0.02 K/UL (ref 0–0.2)
BASOPHILS NFR BLD: 0.5 % (ref 0–1.9)
BILIRUB SERPL-MCNC: 8.7 MG/DL (ref 0.1–1)
BLD PROD TYP BPU: NORMAL
BLOOD UNIT EXPIRATION DATE: NORMAL
BLOOD UNIT TYPE CODE: 7300
BLOOD UNIT TYPE: NORMAL
BUN SERPL-MCNC: 7 MG/DL (ref 6–20)
BURR CELLS BLD QL SMEAR: ABNORMAL
CA-I BLDV-SCNC: 1 MMOL/L (ref 1.06–1.42)
CALCIUM SERPL-MCNC: 7.5 MG/DL (ref 8.7–10.5)
CHLORIDE SERPL-SCNC: 115 MMOL/L (ref 95–110)
CO2 SERPL-SCNC: 21 MMOL/L (ref 23–29)
CODING SYSTEM: NORMAL
CREAT SERPL-MCNC: 0.3 MG/DL (ref 0.5–1.4)
CROSSMATCH INTERPRETATION: NORMAL
DACRYOCYTES BLD QL SMEAR: ABNORMAL
DIFFERENTIAL METHOD: ABNORMAL
DISPENSE STATUS: NORMAL
DOHLE BOD BLD QL SMEAR: PRESENT
EOSINOPHIL # BLD AUTO: 0.1 K/UL (ref 0–0.5)
EOSINOPHIL NFR BLD: 1.9 % (ref 0–8)
ERYTHROCYTE [DISTWIDTH] IN BLOOD BY AUTOMATED COUNT: 23.1 % (ref 11.5–14.5)
EST. GFR  (NO RACE VARIABLE): >60 ML/MIN/1.73 M^2
FIBRINOGEN PPP-MCNC: 84 MG/DL (ref 182–400)
GLUCOSE SERPL-MCNC: 128 MG/DL (ref 70–110)
HCT VFR BLD AUTO: 27.1 % (ref 37–48.5)
HGB BLD-MCNC: 8.1 G/DL (ref 12–16)
HYPOCHROMIA BLD QL SMEAR: ABNORMAL
IMM GRANULOCYTES # BLD AUTO: 0.07 K/UL (ref 0–0.04)
IMM GRANULOCYTES NFR BLD AUTO: 1.6 % (ref 0–0.5)
INR PPP: 1.9 (ref 0.8–1.2)
LYMPHOCYTES # BLD AUTO: 0.7 K/UL (ref 1–4.8)
LYMPHOCYTES NFR BLD: 15.3 % (ref 18–48)
MAGNESIUM SERPL-MCNC: 1.9 MG/DL (ref 1.6–2.6)
MCH RBC QN AUTO: 29.6 PG (ref 27–31)
MCHC RBC AUTO-ENTMCNC: 29.9 G/DL (ref 32–36)
MCV RBC AUTO: 99 FL (ref 82–98)
MONOCYTES # BLD AUTO: 0.4 K/UL (ref 0.3–1)
MONOCYTES NFR BLD: 9.2 % (ref 4–15)
NEUTROPHILS # BLD AUTO: 3 K/UL (ref 1.8–7.7)
NEUTROPHILS NFR BLD: 71.5 % (ref 38–73)
NRBC BLD-RTO: 1 /100 WBC
OVALOCYTES BLD QL SMEAR: ABNORMAL
PHOSPHATE SERPL-MCNC: 2.4 MG/DL (ref 2.7–4.5)
PLATELET # BLD AUTO: 35 K/UL (ref 150–450)
PLATELET BLD QL SMEAR: ABNORMAL
PMV BLD AUTO: 9.6 FL (ref 9.2–12.9)
POIKILOCYTOSIS BLD QL SMEAR: ABNORMAL
POLYCHROMASIA BLD QL SMEAR: ABNORMAL
POTASSIUM SERPL-SCNC: 3.5 MMOL/L (ref 3.5–5.1)
PROT SERPL-MCNC: 4.9 G/DL (ref 6–8.4)
PROTHROMBIN TIME: 19.5 SEC (ref 9–12.5)
RBC # BLD AUTO: 2.74 M/UL (ref 4–5.4)
SCHISTOCYTES BLD QL SMEAR: ABNORMAL
SCHISTOCYTES BLD QL SMEAR: PRESENT
SODIUM SERPL-SCNC: 146 MMOL/L (ref 136–145)
SPHEROCYTES BLD QL SMEAR: ABNORMAL
TOXIC GRANULES BLD QL SMEAR: PRESENT
UNIT NUMBER: NORMAL
WBC # BLD AUTO: 4.25 K/UL (ref 3.9–12.7)
WBC TOXIC VACUOLES BLD QL SMEAR: PRESENT

## 2023-06-22 PROCEDURE — 25000003 PHARM REV CODE 250

## 2023-06-22 PROCEDURE — 95714 VEEG EA 12-26 HR UNMNTR: CPT

## 2023-06-22 PROCEDURE — 95720 EEG PHY/QHP EA INCR W/VEEG: CPT | Mod: ,,, | Performed by: PSYCHIATRY & NEUROLOGY

## 2023-06-22 PROCEDURE — 51701 INSERT BLADDER CATHETER: CPT

## 2023-06-22 PROCEDURE — 51798 US URINE CAPACITY MEASURE: CPT

## 2023-06-22 PROCEDURE — 63600175 PHARM REV CODE 636 W HCPCS: Performed by: PHYSICIAN ASSISTANT

## 2023-06-22 PROCEDURE — 25000242 PHARM REV CODE 250 ALT 637 W/ HCPCS: Performed by: PSYCHIATRY & NEUROLOGY

## 2023-06-22 PROCEDURE — P9035 PLATELET PHERES LEUKOREDUCED: HCPCS | Performed by: NURSE PRACTITIONER

## 2023-06-22 PROCEDURE — 63600175 PHARM REV CODE 636 W HCPCS: Mod: JZ,JG

## 2023-06-22 PROCEDURE — 63600175 PHARM REV CODE 636 W HCPCS: Performed by: STUDENT IN AN ORGANIZED HEALTH CARE EDUCATION/TRAINING PROGRAM

## 2023-06-22 PROCEDURE — C9113 INJ PANTOPRAZOLE SODIUM, VIA: HCPCS | Performed by: STUDENT IN AN ORGANIZED HEALTH CARE EDUCATION/TRAINING PROGRAM

## 2023-06-22 PROCEDURE — 99291 CRITICAL CARE FIRST HOUR: CPT | Mod: ,,, | Performed by: PSYCHIATRY & NEUROLOGY

## 2023-06-22 PROCEDURE — 94640 AIRWAY INHALATION TREATMENT: CPT

## 2023-06-22 PROCEDURE — 80053 COMPREHEN METABOLIC PANEL: CPT

## 2023-06-22 PROCEDURE — P9047 ALBUMIN (HUMAN), 25%, 50ML: HCPCS | Mod: JZ,JG

## 2023-06-22 PROCEDURE — 95720 PR EEG, W/VIDEO, CONT RECORD, I&R, >12<26 HRS: ICD-10-PCS | Mod: ,,, | Performed by: PSYCHIATRY & NEUROLOGY

## 2023-06-22 PROCEDURE — 85384 FIBRINOGEN ACTIVITY: CPT | Performed by: NURSE PRACTITIONER

## 2023-06-22 PROCEDURE — 85610 PROTHROMBIN TIME: CPT | Performed by: NURSE PRACTITIONER

## 2023-06-22 PROCEDURE — 99900035 HC TECH TIME PER 15 MIN (STAT)

## 2023-06-22 PROCEDURE — 63600175 PHARM REV CODE 636 W HCPCS

## 2023-06-22 PROCEDURE — 25000003 PHARM REV CODE 250: Performed by: PSYCHIATRY & NEUROLOGY

## 2023-06-22 PROCEDURE — 99291 PR CRITICAL CARE, E/M 30-74 MINUTES: ICD-10-PCS | Mod: ,,,

## 2023-06-22 PROCEDURE — 94668 MNPJ CHEST WALL SBSQ: CPT

## 2023-06-22 PROCEDURE — 84100 ASSAY OF PHOSPHORUS: CPT

## 2023-06-22 PROCEDURE — 99291 PR CRITICAL CARE, E/M 30-74 MINUTES: ICD-10-PCS | Mod: ,,, | Performed by: PSYCHIATRY & NEUROLOGY

## 2023-06-22 PROCEDURE — 95700 EEG CONT REC W/VID EEG TECH: CPT

## 2023-06-22 PROCEDURE — 99223 PR INITIAL HOSPITAL CARE,LEVL III: ICD-10-PCS | Mod: ,,, | Performed by: NURSE PRACTITIONER

## 2023-06-22 PROCEDURE — 94002 VENT MGMT INPAT INIT DAY: CPT

## 2023-06-22 PROCEDURE — 94761 N-INVAS EAR/PLS OXIMETRY MLT: CPT

## 2023-06-22 PROCEDURE — 25000003 PHARM REV CODE 250: Performed by: NURSE PRACTITIONER

## 2023-06-22 PROCEDURE — 27000221 HC OXYGEN, UP TO 24 HOURS

## 2023-06-22 PROCEDURE — 82330 ASSAY OF CALCIUM: CPT | Performed by: PSYCHIATRY & NEUROLOGY

## 2023-06-22 PROCEDURE — 20000000 HC ICU ROOM

## 2023-06-22 PROCEDURE — 99223 1ST HOSP IP/OBS HIGH 75: CPT | Mod: ,,, | Performed by: NURSE PRACTITIONER

## 2023-06-22 PROCEDURE — 63600175 PHARM REV CODE 636 W HCPCS: Performed by: NURSE PRACTITIONER

## 2023-06-22 PROCEDURE — 99291 CRITICAL CARE FIRST HOUR: CPT | Mod: ,,,

## 2023-06-22 PROCEDURE — 83735 ASSAY OF MAGNESIUM: CPT

## 2023-06-22 PROCEDURE — 85025 COMPLETE CBC W/AUTO DIFF WBC: CPT | Performed by: PSYCHIATRY & NEUROLOGY

## 2023-06-22 PROCEDURE — 99900026 HC AIRWAY MAINTENANCE (STAT)

## 2023-06-22 RX ORDER — ETOMIDATE 2 MG/ML
INJECTION INTRAVENOUS
Status: COMPLETED
Start: 2023-06-22 | End: 2023-06-22

## 2023-06-22 RX ORDER — OLANZAPINE 2.5 MG/1
5 TABLET ORAL 2 TIMES DAILY
Status: DISCONTINUED | OUTPATIENT
Start: 2023-06-22 | End: 2023-06-24

## 2023-06-22 RX ORDER — FENTANYL CITRATE 50 UG/ML
25 INJECTION, SOLUTION INTRAMUSCULAR; INTRAVENOUS
Status: DISCONTINUED | OUTPATIENT
Start: 2023-06-22 | End: 2023-06-23

## 2023-06-22 RX ORDER — ROCURONIUM BROMIDE 10 MG/ML
INJECTION, SOLUTION INTRAVENOUS
Status: COMPLETED
Start: 2023-06-22 | End: 2023-06-22

## 2023-06-22 RX ORDER — FAMOTIDINE 20 MG/1
20 TABLET, FILM COATED ORAL 2 TIMES DAILY
Status: DISCONTINUED | OUTPATIENT
Start: 2023-06-22 | End: 2023-06-22

## 2023-06-22 RX ORDER — LORAZEPAM 2 MG/ML
INJECTION INTRAMUSCULAR
Status: COMPLETED
Start: 2023-06-22 | End: 2023-06-22

## 2023-06-22 RX ORDER — HYDROCODONE BITARTRATE AND ACETAMINOPHEN 500; 5 MG/1; MG/1
TABLET ORAL
Status: DISCONTINUED | OUTPATIENT
Start: 2023-06-22 | End: 2023-06-30

## 2023-06-22 RX ORDER — ACETYLCYSTEINE 100 MG/ML
4 SOLUTION ORAL; RESPIRATORY (INHALATION) EVERY 8 HOURS
Status: DISCONTINUED | OUTPATIENT
Start: 2023-06-22 | End: 2023-06-27

## 2023-06-22 RX ORDER — FUROSEMIDE 10 MG/ML
40 INJECTION INTRAMUSCULAR; INTRAVENOUS ONCE
Status: COMPLETED | OUTPATIENT
Start: 2023-06-22 | End: 2023-06-22

## 2023-06-22 RX ORDER — ROCURONIUM BROMIDE 10 MG/ML
60 INJECTION, SOLUTION INTRAVENOUS ONCE
Status: COMPLETED | OUTPATIENT
Start: 2023-06-22 | End: 2023-06-22

## 2023-06-22 RX ORDER — PHENYLEPHRINE HCL IN 0.9% NACL 1 MG/10 ML
SYRINGE (ML) INTRAVENOUS
Status: DISPENSED
Start: 2023-06-22 | End: 2023-06-22

## 2023-06-22 RX ORDER — IPRATROPIUM BROMIDE AND ALBUTEROL SULFATE 2.5; .5 MG/3ML; MG/3ML
3 SOLUTION RESPIRATORY (INHALATION) EVERY 8 HOURS
Status: DISCONTINUED | OUTPATIENT
Start: 2023-06-22 | End: 2023-06-27

## 2023-06-22 RX ORDER — FAMOTIDINE 20 MG/1
20 TABLET, FILM COATED ORAL 2 TIMES DAILY
Status: DISCONTINUED | OUTPATIENT
Start: 2023-06-23 | End: 2023-06-24

## 2023-06-22 RX ORDER — LEVETIRACETAM 500 MG/5ML
500 INJECTION, SOLUTION, CONCENTRATE INTRAVENOUS ONCE
Status: COMPLETED | OUTPATIENT
Start: 2023-06-22 | End: 2023-06-22

## 2023-06-22 RX ORDER — ALBUMIN HUMAN 250 G/1000ML
25 SOLUTION INTRAVENOUS ONCE
Status: COMPLETED | OUTPATIENT
Start: 2023-06-22 | End: 2023-06-22

## 2023-06-22 RX ORDER — LORAZEPAM 2 MG/ML
2 INJECTION INTRAMUSCULAR ONCE
Status: COMPLETED | OUTPATIENT
Start: 2023-06-22 | End: 2023-06-22

## 2023-06-22 RX ORDER — ARIPIPRAZOLE 5 MG/1
5 TABLET ORAL DAILY
Status: DISCONTINUED | OUTPATIENT
Start: 2023-06-23 | End: 2023-06-24

## 2023-06-22 RX ORDER — SPIRONOLACTONE 25 MG/1
50 TABLET ORAL DAILY
Status: DISCONTINUED | OUTPATIENT
Start: 2023-06-23 | End: 2023-06-24

## 2023-06-22 RX ORDER — FUROSEMIDE 10 MG/ML
20 INJECTION INTRAMUSCULAR; INTRAVENOUS ONCE
Status: COMPLETED | OUTPATIENT
Start: 2023-06-22 | End: 2023-06-22

## 2023-06-22 RX ORDER — OLANZAPINE 2.5 MG/1
5 TABLET ORAL 2 TIMES DAILY
Status: DISCONTINUED | OUTPATIENT
Start: 2023-06-22 | End: 2023-06-22

## 2023-06-22 RX ORDER — SUCCINYLCHOLINE CHLORIDE 20 MG/ML
INJECTION INTRAMUSCULAR; INTRAVENOUS
Status: DISCONTINUED
Start: 2023-06-22 | End: 2023-06-22 | Stop reason: WASHOUT

## 2023-06-22 RX ORDER — LEVETIRACETAM 500 MG/5ML
1000 INJECTION, SOLUTION, CONCENTRATE INTRAVENOUS EVERY 12 HOURS
Status: DISCONTINUED | OUTPATIENT
Start: 2023-06-22 | End: 2023-06-25

## 2023-06-22 RX ORDER — ETOMIDATE 2 MG/ML
10 INJECTION INTRAVENOUS ONCE
Status: COMPLETED | OUTPATIENT
Start: 2023-06-22 | End: 2023-06-22

## 2023-06-22 RX ORDER — OXYCODONE HYDROCHLORIDE 5 MG/1
5 TABLET ORAL EVERY 6 HOURS
Status: DISCONTINUED | OUTPATIENT
Start: 2023-06-22 | End: 2023-06-24

## 2023-06-22 RX ORDER — SILODOSIN 4 MG/1
4 CAPSULE ORAL DAILY
Status: DISCONTINUED | OUTPATIENT
Start: 2023-06-23 | End: 2023-06-24

## 2023-06-22 RX ORDER — PROPOFOL 10 MG/ML
VIAL (ML) INTRAVENOUS
Status: DISCONTINUED
Start: 2023-06-22 | End: 2023-06-22 | Stop reason: WASHOUT

## 2023-06-22 RX ORDER — PROPOFOL 10 MG/ML
0-50 INJECTION, EMULSION INTRAVENOUS CONTINUOUS
Status: DISCONTINUED | OUTPATIENT
Start: 2023-06-22 | End: 2023-06-22

## 2023-06-22 RX ORDER — LEVETIRACETAM 500 MG/5ML
2000 INJECTION, SOLUTION, CONCENTRATE INTRAVENOUS ONCE
Status: COMPLETED | OUTPATIENT
Start: 2023-06-22 | End: 2023-06-22

## 2023-06-22 RX ADMIN — FENTANYL CITRATE 25 MCG: 50 INJECTION INTRAMUSCULAR; INTRAVENOUS at 11:06

## 2023-06-22 RX ADMIN — LEVETIRACETAM 1000 MG: 100 INJECTION, SOLUTION INTRAVENOUS at 08:06

## 2023-06-22 RX ADMIN — ROCURONIUM BROMIDE 60 MG: 10 INJECTION, SOLUTION INTRAVENOUS at 02:06

## 2023-06-22 RX ADMIN — TRAZODONE HYDROCHLORIDE 25 MG: 50 TABLET ORAL at 08:06

## 2023-06-22 RX ADMIN — OXYCODONE HYDROCHLORIDE 5 MG: 5 TABLET ORAL at 11:06

## 2023-06-22 RX ADMIN — ACETYLCYSTEINE 4 ML: 100 INHALANT RESPIRATORY (INHALATION) at 05:06

## 2023-06-22 RX ADMIN — LORAZEPAM 2 MG: 2 INJECTION INTRAMUSCULAR; INTRAVENOUS at 02:06

## 2023-06-22 RX ADMIN — MUPIROCIN: 20 OINTMENT TOPICAL at 09:06

## 2023-06-22 RX ADMIN — LEVETIRACETAM 1000 MG: 100 INJECTION, SOLUTION INTRAVENOUS at 09:06

## 2023-06-22 RX ADMIN — LEVETIRACETAM 2000 MG: 100 INJECTION, SOLUTION INTRAVENOUS at 05:06

## 2023-06-22 RX ADMIN — RIFAXIMIN 550 MG: 550 TABLET ORAL at 08:06

## 2023-06-22 RX ADMIN — LORAZEPAM 2 MG: 2 INJECTION INTRAMUSCULAR at 02:06

## 2023-06-22 RX ADMIN — SPIRONOLACTONE 50 MG: 25 TABLET, FILM COATED ORAL at 09:06

## 2023-06-22 RX ADMIN — ETOMIDATE 10 MG: 2 INJECTION INTRAVENOUS at 02:06

## 2023-06-22 RX ADMIN — PANTOPRAZOLE SODIUM 40 MG: 40 INJECTION, POWDER, FOR SOLUTION INTRAVENOUS at 09:06

## 2023-06-22 RX ADMIN — IPRATROPIUM BROMIDE AND ALBUTEROL SULFATE 3 ML: 2.5; .5 SOLUTION RESPIRATORY (INHALATION) at 05:06

## 2023-06-22 RX ADMIN — IPRATROPIUM BROMIDE AND ALBUTEROL SULFATE 3 ML: .5; 3 SOLUTION RESPIRATORY (INHALATION) at 03:06

## 2023-06-22 RX ADMIN — OXYCODONE HYDROCHLORIDE 5 MG: 5 TABLET ORAL at 06:06

## 2023-06-22 RX ADMIN — ARIPIPRAZOLE 5 MG: 5 TABLET ORAL at 09:06

## 2023-06-22 RX ADMIN — OLANZAPINE 5 MG: 2.5 TABLET, FILM COATED ORAL at 11:06

## 2023-06-22 RX ADMIN — PROPOFOL 15 MCG/KG/MIN: 10 INJECTION, EMULSION INTRAVENOUS at 04:06

## 2023-06-22 RX ADMIN — RIFAXIMIN 550 MG: 550 TABLET ORAL at 09:06

## 2023-06-22 RX ADMIN — FUROSEMIDE 20 MG: 10 INJECTION, SOLUTION INTRAMUSCULAR; INTRAVENOUS at 03:06

## 2023-06-22 RX ADMIN — OLANZAPINE 5 MG: 2.5 TABLET, FILM COATED ORAL at 09:06

## 2023-06-22 RX ADMIN — ACETYLCYSTEINE 4 ML: 100 INHALANT RESPIRATORY (INHALATION) at 03:06

## 2023-06-22 RX ADMIN — LEVETIRACETAM 500 MG: 100 INJECTION, SOLUTION INTRAVENOUS at 05:06

## 2023-06-22 RX ADMIN — ALBUMIN (HUMAN) 25 G: 12.5 SOLUTION INTRAVENOUS at 03:06

## 2023-06-22 RX ADMIN — FUROSEMIDE 40 MG: 10 INJECTION, SOLUTION INTRAMUSCULAR; INTRAVENOUS at 11:06

## 2023-06-22 RX ADMIN — SILODOSIN 4 MG: 4 CAPSULE ORAL at 09:06

## 2023-06-22 NOTE — PROCEDURES
DATE: 6/22/23    EEG NUMBER: [id number]    REFERRING PHYSICIAN:  Dr. Andrew     This EEG was performed to assess for subclinical seizures      ELECTROENCEPHALOGRAM REPORT     METHODOLOGY:  Electroencephalographic (EEG) recording is with electrodes placed according to the International 10-20 placement system.  Thirty two (32) channels of digital signal are simultaneously recorded from the scalp and may include EKG, EMG, and/or eye monitors.   Recording band pass was 0.1 to 512 hz.  Digital video recording of the patient is simultaneously recorded with the EEG.  The nursing staff report clinical symptoms and may press an event button when the patient has symptoms of clinical interest to the treating physicians.  EEG and video recording is stored and archived in digital format.  The entire recording is visually reviewed, and the times identified by computer analysis as being spikes or seizures are reviewed again.  Activation procedures which include photic stimulation, hyperventilation and instructing patients to perform simple task are done in selected patients.   Compresses spectral analysis (CSA) is also performed on the activity recorded from each individual channel.  This is displayed as a power display of frequencies from 0 to 30 Hz over time.   The CSA analysis is done and displayed continuously.  This is reviewed for asymmetries in power between homologous areas of the scalp and for presence of changes in power which can be seen when seizures occur.  Sections of suspected abnormalities on the CSA is then compared with the original EEG recording.                [employer] software was also utilized in the review of this study.  This software suite analyzes the EEG recording in multiple domains.  Coherence and rhythmicity is computed to identify EEG sections which may contain organized seizures.  Each channel undergoes analysis to detect presence of spike and sharp waves which have special and morphological  characteristic of epileptic activity.  The routine EEG recording is converted from spacial into frequency domain.  This is then displayed comparing homologous areas to identify areas of significant asymmetry.  Algorithm to identify non-cortically generated artifact is used to separate eye movement, EMG and other artifact from the EEG.     Recording times   Start on June 22, 2023 at hours 3 minute 18 seconds 3   End on June 22, 2023 at hours 7 minute 0 seconds 3   Start on June 22, 2023 at hours 7 minute 0 seconds 11  End on June 22, 2023 at hours 15 minute 43 seconds 17   The total time of EEG recording for the study was 13 hours and 9 minutes    Portions of the record were obscured by artifacts related to electrodes F3    EEG FINDINGS:  The recording was obtained with a number of standard bipolar and referential montages during obtunded state.  Diffuse disorganized low amplitude mixed theta and occasional delta  range slowing was noted which was symmetric.  The background was intermixed with symmetric spindles. Variability and reactivity were noted.  There were no interictal epileptiform abnormalities and no clinical or electrographic seizures were recorded.    The EKG channel revealed a sinus rhythm.     IMPRESSION:  This is an abnormal EEG during obtunded state.  Diffuse disorganized low-amplitude slowing of the background was noted     CLINICAL CORRELATION:  The patient is a 58-year-old female with a history of hepatic cirrhosis currently maintained on Keppra.  Patient also received intravenous propofol prior to the study.  This is an abnormal EEG during obtunded state.  The overall degree of disorganization and slowing for given age is suggestive of a moderate encephalopathy, likely a toxic metabolic encephalopathy.  There is no evidence of an epileptic process on this recording.  No seizures were recorded during this study.

## 2023-06-22 NOTE — NURSING
0219 Lopez Kenney MD with Anesthesia @ bedside to intubate  RT x2, RN x4, NCC PA x2 @ bedside    Pt O2 sats 97% on 15L, HR 90's,  /75  0220 pt being bagged by anesthesia  0221 10mg Etomidate given 60mg Lizandro given  /60, O2 sats 97%, 's     0222 ETT advanced 21 @ teeth, color change noted on ETCO2, BBS auscultated. STAT CXR ordered.

## 2023-06-22 NOTE — PROGRESS NOTES
Pt's pupils using pupillometer are sluggish with NPI R 2.5 and L 2.3. pupil sz is R 3.9 L 4mm. Pt on propofol. Core esoph temp probe placed; Temp reading 95F. Blankets added and obtaining warming blanket. Bleeding from mouth around OGT. Cristela MCMILLAN notified.

## 2023-06-22 NOTE — ASSESSMENT & PLAN NOTE
58F with EtOH induced hepatic cirrhosis, seizure disorder on outpatient LEV and ZNS and bipolar disorder admitted on 5/28 for persistent encephalopathy.    - UTI on admit (Proteus mirabilis), now s/p CTX course  - Hypercalcemia 2/2 lithium toxicity (Lithium changed to Abilify per Psych)  - Hepatic encephalopathy 2/2 to medication non compliance, treated with lactulose and rifaximin     MRI Brain WO was unremarkable for acute neurologic change  EEG w/ frequent left hemispheric electrographic seizures and NCSE  Repeat EEGs without seizure activity x 24 hours, now resolved    6/11 --> acute hypotension, hypoxia --> hypovolemic shock --> Large R peritoneal hematoma --> IR for class A embo --> s/p IR embolization of left lumbar and internal iliac branch foci of arterial extravasation  6/13 --> episode of hypoxia and SOB --> CTA PE without evidence of PE, CT C/A/P without new bleed    - Continued admission to [facility]  - Q4h neurochecks while in ICU  - Q1h vitals while in ICU  - HOB@30  - Keppra 500mg BID  - Thiamine replacement   - Lactulose, titrate to BM  - MAP>65  - Daily CMP, Mag, Phos - replete electrolytes PRN  - Lipid panel, A1c, Coags reviewed  - Daily CBC, transfuse PRN and replace Plts <25  - Daily fibrinogen, PT/INR  - Heparin held in setting of acute bleed/DIC, IVC filter in place  - PT/OT/SLP as appropriate  - CM/SW consult for dispo planning

## 2023-06-22 NOTE — SIGNIFICANT EVENT
During shift patient with varying oxygen requirements of 8-10L NC. Minimal dried blood in the nares on exam at beginning of shift. But patient awake, tracking, and talking.      Approximately 0200 bedside nurse called me to notify that she had increased O2 requirements back up to 12L from 9L and still dropping to 87 sats. Came to room, patient now with L gaze, leaning to her L side, with facial and full body twitching/tremors, sanguinous sputum dripping from mouth onto chest, tachypnic, and satting low 80s.     Increased O2 to 15L, 2mg of ativan pulled up. Tremors stopped but L gaze persisted, not crosssing midline with OCRs. Instructed nurse to give 2mg of ativan. L gaze improved. GCS 6, minimal WD to painful stimlui on bilateral LE R>L, minimal withdrawal on UE, tachypnic with increased work of breathing. Satting mid to upper 80s on 15L NC. Fresh blood noted around mouth.     Anesthesia called to bedside and intubated successfully. Stat CXR w/ ET tube in appropriate position. Propofol start for sedation while paralyzed, and patient sent for STAT CTH.     CTH without any acute intracranial hemorrhage or edema.     EEG cap placed, epilepsy consulted, reloaded with keppra and increased maintenance dose to 1g bid. CXR reviewed, persistent bilateral pulmonary infiltrates/atelectasis. Lasix and albumin given.      Critical condition in that Patient has a condition that poses threat to life and bodily function: Seizure with GCS <8 after resolution requiring intubation and mechanical ventilation for airway protection     65 minutes of Critical care time was spent personally by me on the following activities: development of treatment plan with patient or surrogate and bedside caregivers, discussions with consultants, evaluation of patient's response to treatment, examination of patient, ordering and performing treatments and interventions, ordering and review of laboratory studies, ordering and review of radiographic  studies, pulse oximetry, antibiotic titration if applicable, vasopressor titration if applicable, re-evaluation of patient's condition. This critical care time did not overlap with that of any other provider or involve time for any procedures. There is high probability for acute neurological change leading to clinical and possibly life-threatening deterioration requiring highest level of physician preparedness for urgent intervention.

## 2023-06-22 NOTE — PLAN OF CARE
[facility]  Discharge Reassessment    Primary Care Provider: Viktor Ross MD    Expected Discharge Date: 6/27/2023    Reassessment (most recent)       Discharge Reassessment - 06/22/23 1125          Discharge Reassessment    Assessment Type Discharge Planning Reassessment     Did the patient's condition or plan change since previous assessment? No     Discharge Plan discussed with: Sibling     Discharge Plan A Skilled Nursing Facility     Discharge Plan B Rehab     DME Needed Upon Discharge  none     Transition of Care Barriers None     Why the patient remains in the hospital Requires continued medical care        Post-Acute Status    Post-Acute Authorization Placement     Post-Acute Placement Status Referrals Sent     Discharge Delays None known at this time                 Patient is not medically ready for discharge.   PT/OT  recs; Skilled -planning.     Danyell Palafox LMSW  PRN - [facility]  [phone]

## 2023-06-22 NOTE — ANESTHESIA PROCEDURE NOTES
Ad Hoc Intubation    Date/Time: 6/22/2023 2:21 AM  Performed by: Lopez Kenney MD  Authorized by: Lopez Kenney MD     Indications:  Airway protection  Diagnosis:  Seizure  Patient Location:  ICU  Timeout:  6/22/2023 2:19 AM  Procedure Start Time:  6/22/2023 2:19 AM  Procedure End Time:  6/22/2023 2:23 AM  Staff:     Anesthesiologist Present: No    Intubation:     Induction:  Rapid sequence induction    Intubated:  Postinduction    Mask Ventilation:  Not attempted    Attempts:  1    Attempted By:  Resident anesthesiologist    Method of Intubation:  Video laryngoscopy    Blade:  Buitrgao 3    Laryngeal View Grade: Grade I - full view of chords      Difficult Airway Encountered?: No      Complications:  None    Airway Device:  Oral endotracheal tube    Airway Device Size:  7.0    Style/Cuff Inflation:  Cuffed (inflated to minimal occlusive pressure)    Tube secured:  21    Secured at:  The teeth    Placement Verified By:  Colorimetric ETCO2 device    Complicating Factors:  None    Findings Post-Intubation:  BS equal bilateral and atraumatic/condition of teeth unchanged  Notes:      10mg etomidate and 60mg of rocuronium given for intubation.

## 2023-06-22 NOTE — CONSULTS
[facility] - Neuro Critical Care  Skin Integrity NIKKI  Consult Note    Patient Name: Marely Hamilton  MRN: [id number]  Admission Date: 5/28/2023  Hospital Length of Stay: 25 days  Attending Physician: Nakul Casey MD  Primary Care Provider: Viktor Ross MD     Consults  Subjective:     History of Present Illness:  Marely Hamilton is a 58 year old female w/ PMHx of EtOH induced cirrhosis, seizure on AEDs, and bipolar disorder on lithium who initially presented to [facility] on 5/18/2023 with a primary complaint of altered mental status suspected to be from hepatic encephalopathy versus hypercalcemia who despite correction of her calcium and treatment with lactulose and rifaximin continued to be encephalopathic.  Given patient's persistent encephalopathy, there was a concern that she had uncontrolled hepatic encephalopathy and was transferred to [facility] for further evaluation.     During patient's hospitalization at [facility], she was noted to be hypercalcemic likely secondary to her lithium use for her bipolar disorder.  She was started on aripiprazole at that time.  Given her inability to follow commands, she had an NG-tube placed for her to safely accept medicines.  Upon arrival here, her NG tube was essentially dislodged and subsequently removed.  On bedside swallow assessment, patient was able to follow commands and swallow safely.  Additionally, on arrival patient had a small bowel movement.  When asked where she was, remained silent.  She later shared unprompted that she has a history of glaucoma.  Patient later stated her full name.  She was able to follow simple commands, but not 2 step commands. Pt admitted to hospital medicine service for further management. Skin integrity NIKKI consulted for evaluation of skin injury.      Scheduled Meds:   acetylcysteine 100 mg/ml (10%)  4 mL Nebulization Q8H    albuterol-ipratropium  3 mL Nebulization Q8H    [START ON 6/23/2023]  ARIPiprazole  5 mg Per OG tube Daily    [START ON 6/23/2023] famotidine  20 mg Per OG tube BID    levETIRAcetam (Keppra) IV (PEDS and ADULTS)  1,000 mg Intravenous Q12H    mupirocin   Nasal Daily    OLANZapine  5 mg Per OG tube BID    oxyCODONE  5 mg Per OG tube Q6H    phenylephrine HCl in 0.9% NaCl        rifAXIMin  550 mg Per OG tube BID    [START ON 6/23/2023] silodosin  4 mg Per OG tube Daily    [START ON 6/23/2023] spironolactone  50 mg Per OG tube Daily    traZODone  25 mg Per OG tube QHS     Continuous Infusions:  PRN Meds:sodium chloride, sodium chloride, dextrose 10%, dextrose 10%, dextrose, dextrose, magnesium sulfate IVPB, magnesium sulfate IVPB, naloxone, ondansetron, potassium chloride **AND** potassium chloride **AND** potassium chloride, racepinephrine, simethicone, sodium chloride 0.9%, sodium phosphate IVPB, sodium phosphate IVPB, sodium phosphate IVPB    Review of patient's allergies indicates:   Allergen Reactions    Sulfa (sulfonamide antibiotics) Rash    Codeine Nausea And Vomiting        Past Medical History:   Diagnosis Date    Addiction to drug     Alcohol abuse     Alcohol abuse, in remission 6/15/2015    14.5 weeks ago; AA weekly    Anemia     Anxiety 6/15/2015    Behavioral problem     Bipolar disorder     Bipolar disorder in remission 6/15/2015    Cirrhosis, Laennec's 6/15/2015    Depression     Encounter for blood transfusion     Epistaxis 6/15/2015    Fatigue     Glaucoma     Hematuria     Hepatic encephalopathy 6/15/2015    Hepatic enlargement     History of psychiatric care     History of psychiatric hospitalization     History of seizure 6/15/2015    1    hx of intentional Tylenol overdose 6/15/2015    2005- situational and hx of bipolar    Hx of psychiatric care     Macrocytic anemia 9/18/2015    6 units PRBC since June 2015    Jeana     Osteoarthritis     Other ascites 6/15/2015    Psychiatric exam requested by authority     Psychiatric  problem     Psychosis 9/26/2019    Renal disorder     Seizures     Self-harming behavior     Suicide attempt     Therapy     Thrombocytopenia 6/15/2015     Past Surgical History:   Procedure Laterality Date    COSMETIC SURGERY      EMBOLIZATION N/A 6/11/2023    Procedure: EMBOLIZATION, BLOOD VESSEL;  Surgeon: Debbie Surgeon;  Location: [facility];  Service: Anesthesiology;  Laterality: N/A;    ESOPHAGOGASTRODUODENOSCOPY         Family History       Problem Relation (Age of Onset)    Alcohol abuse Father, Sister, Paternal Grandfather    Bipolar disorder Father    Hypertension Mother    Suicide Father          Tobacco Use    Smoking status: Never    Smokeless tobacco: Never   Substance and Sexual Activity    Alcohol use: Not Currently     Comment: hx of ETOH abuse with cirrhosis of liver    Drug use: No    Sexual activity: Not Currently     Review of Systems   Skin:  Positive for wound.     Objective:     Vital Signs (Most Recent):  Temp: 97 °F (36.1 °C) (06/22/23 1213)  Pulse: 80 (06/22/23 1213)  Resp: 16 (06/22/23 1213)  BP: 139/61 (06/22/23 1213)  SpO2: 98 % (06/22/23 1213) Vital Signs (24h Range):  Temp:  [95 °F (35 °C)-98.9 °F (37.2 °C)] 97 °F (36.1 °C)  Pulse:  [] 80  Resp:  [12-29] 16  SpO2:  [85 %-100 %] 98 %  BP: (108-184)/(51-89) 139/61     Weight: 59.9 kg (132 lb)  Body mass index is 24.14 kg/m².     Physical Exam  Constitutional:       Appearance: Normal appearance.   Skin:     General: Skin is warm and dry.      Findings: Lesion present.   Neurological:      Mental Status: She is alert.        Laboratory:  All pertinent labs reviewed within the last 24 hours.    Diagnostic Results:  None        Assessment/Plan:         NIKKI Skin Integrity Evaluation      Skin Integrity NIKKI evaluation of patient as part of the comprehensive skin care team.   She has been admitted for 25 days. Skin injury was noted on 6/18/23. POA no.    Nose            ENT  Abrasion of nose  - consult received for  evaluation of skin injury to the bridge of the nose.  - pt presented to [facility] on 5/18/2023 with a primary complaint of altered mental status suspected to be from hepatic encephalopathy versus hypercalcemia. Given patient's persistent encephalopathy, there was a concern that she had uncontrolled hepatic encephalopathy and was transferred to [facility] for further evaluation.  - ruptured blister to nose with pink moist wound bed and pink periwound. Thought to be related to an adhesive skin injury from NG tube that was in place.  - continue foam dressing to area.  - nursing to maintain pressure injury prevention measures and continue wound care per orders.        Thank you for your consult. I will follow-up with patient. Please contact us if you have any additional questions.       Gabriella Jackson NP  Skin Integrity NIKKI  [facility]

## 2023-06-22 NOTE — PROGRESS NOTES
[facility] - Neuro Critical Care  Neurocritical Care  Progress Note    Admit Date: 5/28/2023  Service Date: 06/22/2023  Length of Stay: 25    Subjective:     Chief Complaint: Non-convulsive status epilepticus    History of Present Illness: Marely Hamilton is a 58 year old female with a medical history significant for EtOH induced hepatic cirrhosis, seizure disorder on outpatient LEV and ZNS and bipolar disorder who was [place] [facility] [place] as a transfer from OSH for evaluation of persistent encephalopathy concerning for NCSE vs hepatic encephalopathy. History is obtained from chart as patient is unresponsive and unable to assist. Initially presented to [facility] on 5/18 for encephalopathy and thought to be multifactorial including UTI (Proteus mirabilis s/p CTX course), hypercalcemia 2/2 lithium toxicity (Lithium changed to Abilify per Psych), as well as hepatic encephalopathy secondary to medication non compliance. She was treated with lactulose and rifaximin with no improvement in her mental status. EEG showed generalized slowing as well as triphasic discharges in the left hemisphere. MRI Brain WO was unremarkable for acute neurologic change. Decision was made to transfer patient [place] [facility] [place] higher level of care and ongoing evaluation and management. On arrival, mental status exam has waxed and waned with patient being intermittently verbal and following commands. NH4 48.    NCC is consulted on hospital day 4 for admission after EEG showed frequent left hemispheric electrographic seizures lasting on average 10-30 seconds with prolonged seizures over 1.5 hours also noted. Per chart review, patient home dose of LEV increased from 1000mg to 1500mg BID, ZNS increased to 200mg. Vimpat 150mg BID added per General Neurology (had not been given prior to consult). On initial evaluation, patient is not responsive to voice or pain. Upward gaze noted. Decision made to transfer patient to [facility] for higher level of care and  airway watch.       Hospital Course: 06/02/2023 Tachycardic and hypotensive, transferred for development of mostly right hemispheric status, LOC exam remains poor  Intubated for airway protection, EEG has changed to mostly include triphasics with no definite seizure activity per Dr Boss, propofol stopped and AEDs simplified to keppra 1000mg bid only, wean off pressor, give colloids with crystalloid resuscitation  06/03/2023: remains on persistant significant pressor support despite adequate hydration, problems with third spacing and may have pulmonary hypertension as well, consider trial of stress steroids if hgb stabilizes  06/04/2023: levo down to 0.08mcg, start and advance TFs, vaso at 0.04U, ammonia has been stable, slight rise in tbili, check direct bili, hgb and plts improved, no clear source of bleeding, no source of bleeding on CT abdomen, consider superimposed hemolysis  06/05/2023 NAEON. Neurologic exam continues to improve, following commands in all extremities. Levo 0.02, weaning as able. Vaso discontinued, MAP>65. Daily SBT, monitor and hold TF at midnight for possible extubation tomorrow. Hemolysis work up ongoing, trending CBC. Continue CTX for SBP prophylaxis.   06/06/2023 Awake and following commands. SBT with low tidal volumes. Remains intubated for airway protection at this time with possible extubation tomorrow with continued neurologic improvement. Levo discontinued, maintaining SBP<65. Concern for hemolytic anemia related to autoimmune process vs hepatic dysfunction (elevated reticulocyte count and decreased haptoglobin).  06/07/2023 Rested on AC overnight due to low TV and fatigue. SBT with pressure support 10/5 this am, weaning ventilator as tolerated. Continue tube feeds with goal to optimize nutrition. Ammonia elevated, continue lactulose to encourage bowel movement.   06/08/2023 Failed SBT due to apnea. Some breaths on SIMV. Awake and following commands. Ammonia trending down (44) with  BMs, continue lactulose. Advance nutritional support with goal to increase strength towards extubation. Plt transfusions PRN. Cryo for fibrinogen <100.  06/09/2023 Venous US with R brachial and femoral DVT. Dicussed with Hematology with recommendation to start Argatroban with plts>50. Daily SBT with apnea, maintain on SIMV with backup rate of 10. Hyperammonemia resolved.   06/10/2023 patient is more alert and awake, tolerated SBT. Hematology agreed to switch to heparin for DVT given negative for HIT  06/11/2023   On AM rounds, patient was noted to be hypoxic and tachycardic. Hypoxia resolved with ventilator changes but patient continued to appear uncomfortable. Patient lied flat with noted improvement in oxygenation. Patient became acutely hypotensive, tachycardiac and hypoxic not responsive to ventilator changes. IVF bolus + Burton bump x3 + initiation of vasopressin due to concern for hypovolemic shock. Levophed added due to persistent hypertension. L femoral arterial line and R IJ trialysis placed emergently. STAT Hemoglobin 4.4. Received protamine for heparin reversal, 3u Plts and 3 FFP and 2 pRBC. STAT CTA abdo/pelvis with L retroperitoneal hematoma with + spot sign. IR consulted and patient taken class A for diagnostic angiogram with possible emobolization. Pressors weaned after volume resuscitation. Family updated over phone.   06/12/2023 s/p IR embolization of left lumbar and internal iliac branch foci of arterial extravasation. Hemoglobin stable, continue to trend CBC q8 with transfusion of pRBC for Hg<7. Restart trickle feeds. Albumin and lasix for dieresis. Low threshold for antibiotics given risk of SBP and RP hematoma. Unlikely to tolerate AC, will discuss placement of IVC filter with IR.   06/13/2023 Attempted to wean FiO2 overnight with noted drop of pO2. FiO2 increased to 55% and patient received albumin and was diuresed. Weaning this am with ABGs. Remains on fentanyl 12.5, neurologic exam unchanged. Hg  stable. Post AM rounds, patient with acute increase in O2 needs, STAT CTA chest/abd/pelvis to assess for PE vs further hemorrhage stable and without new findings. Continue current management.   06/14/2023 NAEON. Hg stable at 7.6. Plt 44, transfuse for <50. Pending IVC filter per IR for DVT burden with contraindications to AC.  06/15/2023 s/p IVC filter placement. Daily SBT, complicated by anxiety. Remains on spontaneous mode, 10/5, 40% FiO2 with goal to wean as able.   06/16/2023 Rested on AC overnight. Plts low, received 1 pack overnight. Daily SBT with plan to extubate as able.   06/17/2023 extubated on BiPap yesterday, weaning off FiO2. Plts trended down again, received transfusion overnight, no sign of active bleeding.   06/18/2023 Hg 6.7, receiving 1u pRBC. Plan to wean Bipap to high flow NC.  06/19/2023: down to 4L with sats at 100%, still requiring occasional transfusion, otherwise awake and able to interact appropriately with examiner  06/20/2023: Increasing O2 requirements, CXR with atelectasis, continued pancytopenia.  06/21/2023: Continued hypoxia. Bilirubin trending up. Spironolactone increased, lasix 20mg x 1 ordered. D5W bolus. Ammonia level WNL. ABG and CXR ordered.   6/22/23: intubate overnight      Interval History:  D/C propofol, start tube feeds,     Review of Systems   Reason unable to perform ROS: decreae LOC.   Unable to obtain a complete ROS due to level of consciousness.  Objective:     Vitals:  Temp: 97.5 °F (36.4 °C)  Pulse: 81  Rhythm: sinus tachycardia  BP: (!) 121/58  MAP (mmHg): 96  Resp: 16  SpO2: 99 %  Oxygen Concentration (%): 50  Vent Mode: A/C  Set Rate: 16 BPM  Vt Set: 440 mL  PEEP/CPAP: 10 cmH20  Peak Airway Pressure: 26 cmH20  Mean Airway Pressure: 14 cmH20  Plateau Pressure: 25 cmH20    Temp  Min: 95 °F (35 °C)  Max: 98.9 °F (37.2 °C)  Pulse  Min: 72  Max: 125  BP  Min: 108/58  Max: 181/82  MAP (mmHg)  Min: 78  Max: 120  Resp  Min: 12  Max: 29  SpO2  Min: 85 %  Max: 100  %  Oxygen Concentration (%)  Min: 30  Max: 60    06/21 0701 - 06/22 0700  In: 1646.4 [P.O.:100; I.V.:116.8]  Out: 1750 [Urine:1750]   Unmeasured Output  Urine Occurrence: 0  Stool Occurrence: 0  Emesis Occurrence: 0  Pad Count: 2        Physical Exam  Vitals and nursing note reviewed.   Constitutional:       Appearance: She is ill-appearing.   HENT:      Head: Normocephalic.      Nose: Nose normal.      Mouth/Throat:      Mouth: Mucous membranes are moist.      Pharynx: Oropharynx is clear.   Eyes:      Pupils: Pupils are equal, round, and reactive to light.   Cardiovascular:      Rate and Rhythm: Normal rate and regular rhythm.      Pulses: Normal pulses.      Heart sounds: Normal heart sounds.   Pulmonary:      Effort: Pulmonary effort is normal.      Breath sounds: Normal breath sounds.   Abdominal:      General: Bowel sounds are normal.      Palpations: Abdomen is soft.   Musculoskeletal:         General: Swelling present.   Skin:     General: Skin is warm and dry.      Capillary Refill: Capillary refill takes 2 to 3 seconds.     Unable to test orientation, language, memory, judgment, insight, fund of knowledge, hearing, shoulder shrug, tongue protrusion, coordination, gait due to level of consciousness.       Medications:  Continuous Scheduledacetylcysteine 100 mg/ml (10%), 4 mL, Q8H  albuterol-ipratropium, 3 mL, Q8H  [START ON 6/23/2023] ARIPiprazole, 5 mg, Daily  [START ON 6/23/2023] famotidine, 20 mg, BID  levETIRAcetam (Keppra) IV (PEDS and ADULTS), 1,000 mg, Q12H  mupirocin, , Daily  OLANZapine, 5 mg, BID  oxyCODONE, 5 mg, Q6H  phenylephrine HCl in 0.9% NaCl, ,   rifAXIMin, 550 mg, BID  [START ON 6/23/2023] silodosin, 4 mg, Daily  [START ON 6/23/2023] spironolactone, 50 mg, Daily  traZODone, 25 mg, QHS    PRNsodium chloride, , Q24H PRN  sodium chloride, , Q24H PRN  dextrose 10%, 12.5 g, PRN  dextrose 10%, 25 g, PRN  dextrose, 15 g, PRN  dextrose, 30 g, PRN  magnesium sulfate IVPB, 2 g, PRN  magnesium  sulfate IVPB, 4 g, PRN  naloxone, 0.02 mg, PRN  ondansetron, 4 mg, Q8H PRN  potassium chloride, 40 mEq, PRN   And  potassium chloride, 60 mEq, PRN   And  potassium chloride, 80 mEq, PRN  racepinephrine, 0.5 mL, Q4H PRN  simethicone, 1 tablet, QID PRN  sodium chloride 0.9%, 10 mL, Q8H PRN  sodium phosphate IVPB, 15 mmol, PRN  sodium phosphate IVPB, 20.01 mmol, PRN  sodium phosphate IVPB, 30 mmol, PRN      Today I personally reviewed pertinent medications, lines/drains/airways, imaging, cardiology results, laboratory results, microbiology results, notably:    Diet  No diet orders on file  No diet orders on file          Assessment/Plan:     Neuro  * Non-convulsive status epilepticus  58F with EtOH induced hepatic cirrhosis, seizure disorder on outpatient LEV and ZNS and bipolar disorder admitted on 5/28 for persistent encephalopathy.    - UTI on admit (Proteus mirabilis), now s/p CTX course  - Hypercalcemia 2/2 lithium toxicity (Lithium changed to Abilify per Psych)  - Hepatic encephalopathy 2/2 to medication non compliance, treated with lactulose and rifaximin     MRI Brain WO was unremarkable for acute neurologic change  EEG w/ frequent left hemispheric electrographic seizures and NCSE  Repeat EEGs without seizure activity x 24 hours, now resolved    6/11 --> acute hypotension, hypoxia --> hypovolemic shock --> Large R peritoneal hematoma --> IR for class A embo --> s/p IR embolization of left lumbar and internal iliac branch foci of arterial extravasation  6/13 --> episode of hypoxia and SOB --> CTA PE without evidence of PE, CT C/A/P without new bleed    - Continued admission to [facility]  - Q4h neurochecks while in ICU  - Q1h vitals while in ICU  - HOB@30  - Keppra 500mg BID  - Thiamine replacement   - Lactulose, titrate to BM  - MAP>65  - Daily CMP, Mag, Phos - replete electrolytes PRN  - Lipid panel, A1c, Coags reviewed  - Daily CBC, transfuse PRN and replace Plts <25  - Daily fibrinogen, PT/INR  - Heparin held in  setting of acute bleed/DIC, IVC filter in place  - PT/OT/SLP as appropriate  - CM/SW consult for dispo planning    Psychiatric  Bipolar 1 disorder  Continue abilify, zyprexa    Alcohol abuse, in remission  See Non-convulsive status epilepticus    Pulmonary  Atelectasis  Extubated 6/16  Increasing O2 requirements  CXR with atelectasis/ domonique pleural effusions  Pulmonary toileting  Sit up on side of bed or cardiac chair with assistance  Incentive spirometry        Hematology  Coagulopathy  Pancytopenia, low fibrinogen--> DIC post acute blood loss anemia  Transfuse for plt count < 25k    Acute deep vein thrombosis (DVT) of brachial vein of right upper extremity  Nonocclusive R brachial vein DVT in area of previous IV site, noted on 6/8  Also R IJ DVT noted on 6/19  Argatroban per Heme recs - transitioned to heparin 6/10  HELD 6/11 in setting of retroperitoneal bleed  IVC filter placed 6/14  Unable to anticoagulate due to continued pancytopenia/thrombocytopenia    Deep vein thrombosis (DVT) of femoral vein of right lower extremity  Nonocclusive right proximal femoral vein DVT  Noted on 6/8  Argatroban per Heme recs - transitioned to heparin  HELD 6/11 in setting of hypovolemic shock 2/2 retroperitoneal hemorrhage   IVC filter placed 6/14  Unable to anticoagulate due to continued pancytopenia/thrombocytopenia    Thrombocytopenia  Likely secondary to hepatic cirrhosis and development of splenomegaly  Hematology consulted, tested negative for HIT    6/11 --> acute hypotension, hypoxia --> hypovolemic shock --> Large R peritoneal hematoma --> IR for class A embo --> s/p IR embolization of left lumbar and internal iliac branch foci of arterial extravasation    Plts continue to trending down despite transfusion, however, no active bleeding. Will transfuse if plt < 25, or < 50 with active bleeding    PLT given    Endocrine  Severe protein-calorie malnutrition  Decreased intake due to dysphagia  Diet texture per SLP  Nutrition  following    GI  Hepatic cirrhosis  Continue Rifaximin   Ammonia normalized   Follow coags and fibrinogen    Consider re-consult hepatology as stabilizes   Spironolactone increased, monitor K level, replaced prn  Bilirubin trending up    Hepatic encephalopathy  Improving    Other  Endotracheally intubated  - intubated overnight due to hypooxic SPO2 < 85%  - daily SBT  - Daily CXR  - 40 mg Lasix X1    Retroperitoneal bleed  6/11 --> acute hypotension, hypoxia --> hypovolemic shock --> Large R peritoneal hematoma --> IR for class A embo --> s/p IR embolization of left lumbar and internal iliac branch foci of arterial extravasation    -Repeat CT c/a/p stable          The patient is being Prophylaxed for:  Venous Thromboembolism with: Mechanical or Chemical  Stress Ulcer with: Not Applicable   Ventilator Pneumonia with: not applicable    Activity Orders          Discontinue arterial line starting at 06/18 1927    Elevate HOB flat until bedrest complete starting at 06/11 1641    Turn patient starting at 05/28 2253    Progressive Mobility Protocol (mobilize patient to their highest level of functioning at least twice daily) starting at 05/28 2000        Full Code  Critical care time 40 mins  Cristela Osuna NP  Neurocritical Care  [facility] - Neuro Critical Care

## 2023-06-22 NOTE — HPI
Marely Hamilton is a 58 year old female w/ PMHx of EtOH induced cirrhosis, seizure on AEDs, and bipolar disorder on lithium who initially presented to [facility] on 5/18/2023 with a primary complaint of altered mental status suspected to be from hepatic encephalopathy versus hypercalcemia who despite correction of her calcium and treatment with lactulose and rifaximin continued to be encephalopathic.  Given patient's persistent encephalopathy, there was a concern that she had uncontrolled hepatic encephalopathy and was transferred to [facility] for further evaluation.     During patient's hospitalization at [facility], she was noted to be hypercalcemic likely secondary to her lithium use for her bipolar disorder.  She was started on aripiprazole at that time.  Given her inability to follow commands, she had an NG-tube placed for her to safely accept medicines.  Upon arrival here, her NG tube was essentially dislodged and subsequently removed.  On bedside swallow assessment, patient was able to follow commands and swallow safely.  Additionally, on arrival patient had a small bowel movement.  When asked where she was, remained silent.  She later shared unprompted that she has a history of glaucoma.  Patient later stated her full name.  She was able to follow simple commands, but not 2 step commands. Pt admitted to hospital medicine service for further management. Skin integrity NIKKI consulted for evaluation of skin injury.

## 2023-06-22 NOTE — SUBJECTIVE & OBJECTIVE
Interval History:  D/C propofol, start tube feeds,     Review of Systems   Reason unable to perform ROS: decreae LOC.   Unable to obtain a complete ROS due to level of consciousness.  Objective:     Vitals:  Temp: 97.5 °F (36.4 °C)  Pulse: 81  Rhythm: sinus tachycardia  BP: (!) 121/58  MAP (mmHg): 96  Resp: 16  SpO2: 99 %  Oxygen Concentration (%): 50  Vent Mode: A/C  Set Rate: 16 BPM  Vt Set: 440 mL  PEEP/CPAP: 10 cmH20  Peak Airway Pressure: 26 cmH20  Mean Airway Pressure: 14 cmH20  Plateau Pressure: 25 cmH20    Temp  Min: 95 °F (35 °C)  Max: 98.9 °F (37.2 °C)  Pulse  Min: 72  Max: 125  BP  Min: 108/58  Max: 181/82  MAP (mmHg)  Min: 78  Max: 120  Resp  Min: 12  Max: 29  SpO2  Min: 85 %  Max: 100 %  Oxygen Concentration (%)  Min: 30  Max: 60    06/21 0701 - 06/22 0700  In: 1646.4 [P.O.:100; I.V.:116.8]  Out: 1750 [Urine:1750]   Unmeasured Output  Urine Occurrence: 0  Stool Occurrence: 0  Emesis Occurrence: 0  Pad Count: 2        Physical Exam  Vitals and nursing note reviewed.   Constitutional:       Appearance: She is ill-appearing.   HENT:      Head: Normocephalic.      Nose: Nose normal.      Mouth/Throat:      Mouth: Mucous membranes are moist.      Pharynx: Oropharynx is clear.   Eyes:      Pupils: Pupils are equal, round, and reactive to light.   Cardiovascular:      Rate and Rhythm: Normal rate and regular rhythm.      Pulses: Normal pulses.      Heart sounds: Normal heart sounds.   Pulmonary:      Effort: Pulmonary effort is normal.      Breath sounds: Normal breath sounds.   Abdominal:      General: Bowel sounds are normal.      Palpations: Abdomen is soft.   Musculoskeletal:         General: Swelling present.   Skin:     General: Skin is warm and dry.      Capillary Refill: Capillary refill takes 2 to 3 seconds.     Unable to test orientation, language, memory, judgment, insight, fund of knowledge, hearing, shoulder shrug, tongue protrusion, coordination, gait due to level of consciousness.        Medications:  Continuous Scheduledacetylcysteine 100 mg/ml (10%), 4 mL, Q8H  albuterol-ipratropium, 3 mL, Q8H  [START ON 6/23/2023] ARIPiprazole, 5 mg, Daily  [START ON 6/23/2023] famotidine, 20 mg, BID  levETIRAcetam (Keppra) IV (PEDS and ADULTS), 1,000 mg, Q12H  mupirocin, , Daily  OLANZapine, 5 mg, BID  oxyCODONE, 5 mg, Q6H  phenylephrine HCl in 0.9% NaCl, ,   rifAXIMin, 550 mg, BID  [START ON 6/23/2023] silodosin, 4 mg, Daily  [START ON 6/23/2023] spironolactone, 50 mg, Daily  traZODone, 25 mg, QHS    PRNsodium chloride, , Q24H PRN  sodium chloride, , Q24H PRN  dextrose 10%, 12.5 g, PRN  dextrose 10%, 25 g, PRN  dextrose, 15 g, PRN  dextrose, 30 g, PRN  magnesium sulfate IVPB, 2 g, PRN  magnesium sulfate IVPB, 4 g, PRN  naloxone, 0.02 mg, PRN  ondansetron, 4 mg, Q8H PRN  potassium chloride, 40 mEq, PRN   And  potassium chloride, 60 mEq, PRN   And  potassium chloride, 80 mEq, PRN  racepinephrine, 0.5 mL, Q4H PRN  simethicone, 1 tablet, QID PRN  sodium chloride 0.9%, 10 mL, Q8H PRN  sodium phosphate IVPB, 15 mmol, PRN  sodium phosphate IVPB, 20.01 mmol, PRN  sodium phosphate IVPB, 30 mmol, PRN      Today I personally reviewed pertinent medications, lines/drains/airways, imaging, cardiology results, laboratory results, microbiology results, notably:    Diet  No diet orders on file  No diet orders on file

## 2023-06-22 NOTE — ASSESSMENT & PLAN NOTE
Continue Rifaximin   Ammonia normalized   Follow coags and fibrinogen    Consider re-consult hepatology as stabilizes   Spironolactone increased, monitor K level, replaced prn  Bilirubin trending up

## 2023-06-22 NOTE — ASSESSMENT & PLAN NOTE
- consult received for evaluation of skin injury to the bridge of the nose.  - pt presented to [facility] on 5/18/2023 with a primary complaint of altered mental status suspected to be from hepatic encephalopathy versus hypercalcemia. Given patient's persistent encephalopathy, there was a concern that she had uncontrolled hepatic encephalopathy and was transferred to [facility] for further evaluation.  - ruptured blister to nose with pink moist wound bed and pink periwound. Thought to be related to an adhesive skin injury from NG tube that was in place.  - continue foam dressing to area.  - nursing to maintain pressure injury prevention measures and continue wound care per orders.

## 2023-06-22 NOTE — PT/OT/SLP PROGRESS
Speech Language Pathology  Discharge     Marely Hamilton  MRN: [id number]    Patient not seen today secondary to pt now intubated. Please re-consult as appropriate once extubated.

## 2023-06-22 NOTE — PLAN OF CARE
[facility] Care Plan    POC reviewed with Marely Hamilton and family at 0300. Pt verbalized understanding. Questions and concerns addressed. No acute events overnight. Pt progressing toward goals. Will continue to monitor. See below and flowsheets for full assessment and VS info.     - Pt intubated due to seizure and low oxygen.   - Lasix given x1   - Propofol started 15 (non-titratable)  - OG tube placed   - Continuous EEG placed due to seizure   - Keppra  given  - Bladder scan completed       Is this a stroke patient? no    Neuro:  [place] Coma Scale  Best Eye Response: 4-->(E4) spontaneous  Best Motor Response: 6-->(M6) obeys commands  Best Verbal Response: 4-->(V4) confused  [place] Coma Scale Score: 14  Assessment Qualifiers: patient not sedated/intubated, no eye obstruction present  Pupil PERRLA: no     24hr Temp:  [96.9 °F (36.1 °C)-98.9 °F (37.2 °C)]     CV:   Rhythm: sinus tachycardia  BP goals:   SBP < 180  MAP > 65    Resp:      Vent Mode: Spont  Set Rate: 12 BPM  Oxygen Concentration (%): 60  Vt Set: 340 mL  PEEP/CPAP: 5 cmH20  Pressure Support: 10 cmH20    Plan: wean to extubate    GI/:     Diet/Nutrition Received: mechanical/dental soft  Last Bowel Movement: 06/21/23  Voiding Characteristics: external catheter    Intake/Output Summary (Last 24 hours) at 6/22/2023 0511  Last data filed at 6/22/2023 0401  Gross per 24 hour   Intake 1829.85 ml   Output 1960 ml   Net -130.15 ml     Unmeasured Output  Urine Occurrence: 0  Stool Occurrence: 0  Emesis Occurrence: 0  Pad Count: 2    Labs/Accuchecks:  Recent Labs   Lab 06/22/23 0112   WBC 4.25   RBC 2.74*   HGB 8.1*   HCT 27.1*   PLT 35*      Recent Labs   Lab 06/22/23 0112   *   K 3.5   CO2 21*   *   BUN 7   CREATININE 0.3*   ALKPHOS 112   ALT 27   AST 44*   BILITOT 8.7*      Recent Labs   Lab 06/21/23  0912 06/22/23 0112   INR  --  1.9*   APTT 33.8*  --     No results for input(s): CPK, CPKMB, TROPONINI, MB in the last 168  hours.    Electrolytes: Electrolytes replaced  Accuchecks: none    Gtts:   propofol 15 mcg/kg/min (06/22/23 0456)    propofoL         LDA/Wounds:  Lines/Drains/Airways       Airway  Duration                Airway Anesthesia 06/22/23 <1 day              Peripheral Intravenous Line  Duration                  Peripheral IV - Single Lumen 06/15/23 1229 20 G;1 3/4 in Right Lower leg 6 days         Peripheral IV - Single Lumen 06/21/23 0012 20 G Anterior;Left Upper Arm 1 day         Peripheral IV - Single Lumen 06/21/23 0013 22 G Left;Posterior Wrist 1 day                  Wounds: No  Wound care consulted: No   Problem: Adult Inpatient Plan of Care  Goal: Absence of Hospital-Acquired Illness or Injury  Intervention: Prevent and Manage VTE (Venous Thromboembolism) Risk  Flowsheets (Taken 6/22/2023 0510)  Activity Management:   Arm raise - L1   Leg kicks - L2  VTE Prevention/Management: remove, assess skin, and reapply sequential compression device  Range of Motion: active ROM (range of motion) encouraged  Goal: Optimal Comfort and Wellbeing  Intervention: Provide Person-Centered Care  Flowsheets (Taken 6/22/2023 0510)  Trust Relationship/Rapport:   care explained   thoughts/feelings acknowledged   reassurance provided     Problem: Skin Injury Risk Increased  Goal: Skin Health and Integrity  Intervention: Promote and Optimize Oral Intake  Flowsheets (Taken 6/22/2023 0510)  Oral Nutrition Promotion: social interaction promoted     Problem: Impaired Wound Healing  Goal: Optimal Wound Healing  Intervention: Promote Wound Healing  Flowsheets (Taken 6/22/2023 0510)  Oral Nutrition Promotion: social interaction promoted  Activity Management:   Arm raise - L1   Leg kicks - L2     Problem: Attention and Thought Clarity Impairment (Delirium)  Goal: Improved Attention and Thought Clarity  Intervention: Maximize Cognitive Function  Flowsheets (Taken 6/22/2023 0510)  Reorientation Measures:   calendar in view   clock in view  Sensory  Stimulation Regulation: television on

## 2023-06-22 NOTE — SUBJECTIVE & OBJECTIVE
Scheduled Meds:   acetylcysteine 100 mg/ml (10%)  4 mL Nebulization Q8H    albuterol-ipratropium  3 mL Nebulization Q8H    [START ON 6/23/2023] ARIPiprazole  5 mg Per OG tube Daily    [START ON 6/23/2023] famotidine  20 mg Per OG tube BID    levETIRAcetam (Keppra) IV (PEDS and ADULTS)  1,000 mg Intravenous Q12H    mupirocin   Nasal Daily    OLANZapine  5 mg Per OG tube BID    oxyCODONE  5 mg Per OG tube Q6H    phenylephrine HCl in 0.9% NaCl        rifAXIMin  550 mg Per OG tube BID    [START ON 6/23/2023] silodosin  4 mg Per OG tube Daily    [START ON 6/23/2023] spironolactone  50 mg Per OG tube Daily    traZODone  25 mg Per OG tube QHS     Continuous Infusions:  PRN Meds:sodium chloride, sodium chloride, dextrose 10%, dextrose 10%, dextrose, dextrose, magnesium sulfate IVPB, magnesium sulfate IVPB, naloxone, ondansetron, potassium chloride **AND** potassium chloride **AND** potassium chloride, racepinephrine, simethicone, sodium chloride 0.9%, sodium phosphate IVPB, sodium phosphate IVPB, sodium phosphate IVPB    Review of patient's allergies indicates:   Allergen Reactions    Sulfa (sulfonamide antibiotics) Rash    Codeine Nausea And Vomiting        Past Medical History:   Diagnosis Date    Addiction to drug     Alcohol abuse     Alcohol abuse, in remission 6/15/2015    14.5 weeks ago; AA weekly    Anemia     Anxiety 6/15/2015    Behavioral problem     Bipolar disorder     Bipolar disorder in remission 6/15/2015    Cirrhosis, Laennec's 6/15/2015    Depression     Encounter for blood transfusion     Epistaxis 6/15/2015    Fatigue     Glaucoma     Hematuria     Hepatic encephalopathy 6/15/2015    Hepatic enlargement     History of psychiatric care     History of psychiatric hospitalization     History of seizure 6/15/2015    1    hx of intentional Tylenol overdose 6/15/2015    2005- situational and hx of bipolar    Hx of psychiatric care     Macrocytic anemia 9/18/2015    6 units PRBC since June 2015    Jeana      Osteoarthritis     Other ascites 6/15/2015    Psychiatric exam requested by authority     Psychiatric problem     Psychosis 9/26/2019    Renal disorder     Seizures     Self-harming behavior     Suicide attempt     Therapy     Thrombocytopenia 6/15/2015     Past Surgical History:   Procedure Laterality Date    COSMETIC SURGERY      EMBOLIZATION N/A 6/11/2023    Procedure: EMBOLIZATION, BLOOD VESSEL;  Surgeon: Debbie Surgeon;  Location: [facility];  Service: Anesthesiology;  Laterality: N/A;    ESOPHAGOGASTRODUODENOSCOPY         Family History       Problem Relation (Age of Onset)    Alcohol abuse Father, Sister, Paternal Grandfather    Bipolar disorder Father    Hypertension Mother    Suicide Father          Tobacco Use    Smoking status: Never    Smokeless tobacco: Never   Substance and Sexual Activity    Alcohol use: Not Currently     Comment: hx of ETOH abuse with cirrhosis of liver    Drug use: No    Sexual activity: Not Currently     Review of Systems   Skin:  Positive for wound.     Objective:     Vital Signs (Most Recent):  Temp: 97 °F (36.1 °C) (06/22/23 1213)  Pulse: 80 (06/22/23 1213)  Resp: 16 (06/22/23 1213)  BP: 139/61 (06/22/23 1213)  SpO2: 98 % (06/22/23 1213) Vital Signs (24h Range):  Temp:  [95 °F (35 °C)-98.9 °F (37.2 °C)] 97 °F (36.1 °C)  Pulse:  [] 80  Resp:  [12-29] 16  SpO2:  [85 %-100 %] 98 %  BP: (108-184)/(51-89) 139/61     Weight: 59.9 kg (132 lb)  Body mass index is 24.14 kg/m².     Physical Exam  Constitutional:       Appearance: Normal appearance.   Skin:     General: Skin is warm and dry.      Findings: Lesion present.   Neurological:      Mental Status: She is alert.        Laboratory:  All pertinent labs reviewed within the last 24 hours.    Diagnostic Results:  None

## 2023-06-22 NOTE — ASSESSMENT & PLAN NOTE
Extubated 6/16  Increasing O2 requirements  CXR with atelectasis/ domonique pleural effusions  Pulmonary toileting  Sit up on side of bed or cardiac chair with assistance  Incentive spirometry

## 2023-06-23 PROBLEM — Z97.8 ENDOTRACHEALLY INTUBATED: Status: RESOLVED | Noted: 2023-06-22 | Resolved: 2023-06-23

## 2023-06-23 LAB
ALBUMIN SERPL BCP-MCNC: 2.5 G/DL (ref 3.5–5.2)
ALP SERPL-CCNC: 99 U/L (ref 55–135)
ALT SERPL W/O P-5'-P-CCNC: 21 U/L (ref 10–44)
ANION GAP SERPL CALC-SCNC: 10 MMOL/L (ref 8–16)
AST SERPL-CCNC: 40 U/L (ref 10–40)
BASOPHILS # BLD AUTO: 0.01 K/UL (ref 0–0.2)
BASOPHILS # BLD AUTO: 0.01 K/UL (ref 0–0.2)
BASOPHILS NFR BLD: 0.4 % (ref 0–1.9)
BASOPHILS NFR BLD: 0.4 % (ref 0–1.9)
BILIRUB SERPL-MCNC: 7.6 MG/DL (ref 0.1–1)
BLD PROD TYP BPU: NORMAL
BLOOD UNIT EXPIRATION DATE: NORMAL
BLOOD UNIT TYPE CODE: 5100
BLOOD UNIT TYPE: NORMAL
BUN SERPL-MCNC: 8 MG/DL (ref 6–20)
CALCIUM SERPL-MCNC: 8 MG/DL (ref 8.7–10.5)
CHLORIDE SERPL-SCNC: 116 MMOL/L (ref 95–110)
CO2 SERPL-SCNC: 21 MMOL/L (ref 23–29)
CODING SYSTEM: NORMAL
CREAT SERPL-MCNC: 0.4 MG/DL (ref 0.5–1.4)
CROSSMATCH INTERPRETATION: NORMAL
DIFFERENTIAL METHOD: ABNORMAL
DIFFERENTIAL METHOD: ABNORMAL
DISPENSE STATUS: NORMAL
EOSINOPHIL # BLD AUTO: 0 K/UL (ref 0–0.5)
EOSINOPHIL # BLD AUTO: 0.1 K/UL (ref 0–0.5)
EOSINOPHIL NFR BLD: 0.8 % (ref 0–8)
EOSINOPHIL NFR BLD: 2.2 % (ref 0–8)
ERYTHROCYTE [DISTWIDTH] IN BLOOD BY AUTOMATED COUNT: 23.7 % (ref 11.5–14.5)
ERYTHROCYTE [DISTWIDTH] IN BLOOD BY AUTOMATED COUNT: 24.5 % (ref 11.5–14.5)
EST. GFR  (NO RACE VARIABLE): >60 ML/MIN/1.73 M^2
FIBRINOGEN PPP-MCNC: 87 MG/DL (ref 182–400)
GLUCOSE SERPL-MCNC: 90 MG/DL (ref 70–110)
HCT VFR BLD AUTO: 21.9 % (ref 37–48.5)
HCT VFR BLD AUTO: 23.3 % (ref 37–48.5)
HGB BLD-MCNC: 6.7 G/DL (ref 12–16)
HGB BLD-MCNC: 7.2 G/DL (ref 12–16)
IMM GRANULOCYTES # BLD AUTO: 0.01 K/UL (ref 0–0.04)
IMM GRANULOCYTES # BLD AUTO: 0.04 K/UL (ref 0–0.04)
IMM GRANULOCYTES NFR BLD AUTO: 0.4 % (ref 0–0.5)
IMM GRANULOCYTES NFR BLD AUTO: 1.4 % (ref 0–0.5)
INR PPP: 1.8 (ref 0.8–1.2)
LYMPHOCYTES # BLD AUTO: 0.6 K/UL (ref 1–4.8)
LYMPHOCYTES # BLD AUTO: 0.8 K/UL (ref 1–4.8)
LYMPHOCYTES NFR BLD: 20.5 % (ref 18–48)
LYMPHOCYTES NFR BLD: 31.3 % (ref 18–48)
MAGNESIUM SERPL-MCNC: 1.8 MG/DL (ref 1.6–2.6)
MCH RBC QN AUTO: 29.1 PG (ref 27–31)
MCH RBC QN AUTO: 29.8 PG (ref 27–31)
MCHC RBC AUTO-ENTMCNC: 30.6 G/DL (ref 32–36)
MCHC RBC AUTO-ENTMCNC: 30.9 G/DL (ref 32–36)
MCV RBC AUTO: 95 FL (ref 82–98)
MCV RBC AUTO: 96 FL (ref 82–98)
MONOCYTES # BLD AUTO: 0.2 K/UL (ref 0.3–1)
MONOCYTES # BLD AUTO: 0.3 K/UL (ref 0.3–1)
MONOCYTES NFR BLD: 8.6 % (ref 4–15)
MONOCYTES NFR BLD: 9.4 % (ref 4–15)
NEUTROPHILS # BLD AUTO: 1.5 K/UL (ref 1.8–7.7)
NEUTROPHILS # BLD AUTO: 1.8 K/UL (ref 1.8–7.7)
NEUTROPHILS NFR BLD: 58.5 % (ref 38–73)
NEUTROPHILS NFR BLD: 66.1 % (ref 38–73)
NRBC BLD-RTO: 0 /100 WBC
NRBC BLD-RTO: 1 /100 WBC
PHOSPHATE SERPL-MCNC: 1.8 MG/DL (ref 2.7–4.5)
PLATELET # BLD AUTO: 33 K/UL (ref 150–450)
PLATELET # BLD AUTO: 41 K/UL (ref 150–450)
PMV BLD AUTO: 10.1 FL (ref 9.2–12.9)
PMV BLD AUTO: 11.3 FL (ref 9.2–12.9)
POTASSIUM SERPL-SCNC: 3.3 MMOL/L (ref 3.5–5.1)
PROT SERPL-MCNC: 4.7 G/DL (ref 6–8.4)
PROTHROMBIN TIME: 19.1 SEC (ref 9–12.5)
RBC # BLD AUTO: 2.3 M/UL (ref 4–5.4)
RBC # BLD AUTO: 2.42 M/UL (ref 4–5.4)
SODIUM SERPL-SCNC: 147 MMOL/L (ref 136–145)
UNIT NUMBER: NORMAL
WBC # BLD AUTO: 2.56 K/UL (ref 3.9–12.7)
WBC # BLD AUTO: 2.78 K/UL (ref 3.9–12.7)

## 2023-06-23 PROCEDURE — 99291 PR CRITICAL CARE, E/M 30-74 MINUTES: ICD-10-PCS | Mod: ,,, | Performed by: PSYCHIATRY & NEUROLOGY

## 2023-06-23 PROCEDURE — 95720 EEG PHY/QHP EA INCR W/VEEG: CPT | Mod: ,,, | Performed by: PSYCHIATRY & NEUROLOGY

## 2023-06-23 PROCEDURE — P9012 CRYOPRECIPITATE EACH UNIT: HCPCS | Performed by: PHYSICIAN ASSISTANT

## 2023-06-23 PROCEDURE — 99291 CRITICAL CARE FIRST HOUR: CPT | Mod: ,,, | Performed by: PSYCHIATRY & NEUROLOGY

## 2023-06-23 PROCEDURE — 51798 US URINE CAPACITY MEASURE: CPT

## 2023-06-23 PROCEDURE — 85384 FIBRINOGEN ACTIVITY: CPT | Performed by: NURSE PRACTITIONER

## 2023-06-23 PROCEDURE — 25000003 PHARM REV CODE 250: Performed by: PSYCHIATRY & NEUROLOGY

## 2023-06-23 PROCEDURE — 27000190 HC CPAP FULL FACE MASK W/VALVE

## 2023-06-23 PROCEDURE — 95720 PR EEG, W/VIDEO, CONT RECORD, I&R, >12<26 HRS: ICD-10-PCS | Mod: ,,, | Performed by: PSYCHIATRY & NEUROLOGY

## 2023-06-23 PROCEDURE — 63600175 PHARM REV CODE 636 W HCPCS: Performed by: PHYSICIAN ASSISTANT

## 2023-06-23 PROCEDURE — 95714 VEEG EA 12-26 HR UNMNTR: CPT

## 2023-06-23 PROCEDURE — 85025 COMPLETE CBC W/AUTO DIFF WBC: CPT | Mod: 91 | Performed by: PHYSICIAN ASSISTANT

## 2023-06-23 PROCEDURE — 85610 PROTHROMBIN TIME: CPT | Performed by: NURSE PRACTITIONER

## 2023-06-23 PROCEDURE — 94640 AIRWAY INHALATION TREATMENT: CPT

## 2023-06-23 PROCEDURE — 94761 N-INVAS EAR/PLS OXIMETRY MLT: CPT

## 2023-06-23 PROCEDURE — 84100 ASSAY OF PHOSPHORUS: CPT

## 2023-06-23 PROCEDURE — 63600175 PHARM REV CODE 636 W HCPCS

## 2023-06-23 PROCEDURE — 94003 VENT MGMT INPAT SUBQ DAY: CPT

## 2023-06-23 PROCEDURE — 85025 COMPLETE CBC W/AUTO DIFF WBC: CPT | Performed by: PSYCHIATRY & NEUROLOGY

## 2023-06-23 PROCEDURE — 25000242 PHARM REV CODE 250 ALT 637 W/ HCPCS: Performed by: PSYCHIATRY & NEUROLOGY

## 2023-06-23 PROCEDURE — 20000000 HC ICU ROOM

## 2023-06-23 PROCEDURE — 27200966 HC CLOSED SUCTION SYSTEM

## 2023-06-23 PROCEDURE — 99900035 HC TECH TIME PER 15 MIN (STAT)

## 2023-06-23 PROCEDURE — 94660 CPAP INITIATION&MGMT: CPT | Mod: XB

## 2023-06-23 PROCEDURE — 80053 COMPREHEN METABOLIC PANEL: CPT

## 2023-06-23 PROCEDURE — 51701 INSERT BLADDER CATHETER: CPT

## 2023-06-23 PROCEDURE — 27000221 HC OXYGEN, UP TO 24 HOURS

## 2023-06-23 PROCEDURE — 86965 POOLING BLOOD PLATELETS: CPT | Performed by: PHYSICIAN ASSISTANT

## 2023-06-23 PROCEDURE — 83735 ASSAY OF MAGNESIUM: CPT

## 2023-06-23 PROCEDURE — 25000003 PHARM REV CODE 250

## 2023-06-23 PROCEDURE — 99900026 HC AIRWAY MAINTENANCE (STAT)

## 2023-06-23 RX ORDER — HYDROCODONE BITARTRATE AND ACETAMINOPHEN 500; 5 MG/1; MG/1
TABLET ORAL
Status: DISCONTINUED | OUTPATIENT
Start: 2023-06-23 | End: 2023-06-30 | Stop reason: HOSPADM

## 2023-06-23 RX ORDER — FENTANYL CITRATE 50 UG/ML
25 INJECTION, SOLUTION INTRAMUSCULAR; INTRAVENOUS ONCE
Status: COMPLETED | OUTPATIENT
Start: 2023-06-23 | End: 2023-06-23

## 2023-06-23 RX ORDER — FENTANYL CITRATE 50 UG/ML
50 INJECTION, SOLUTION INTRAMUSCULAR; INTRAVENOUS
Status: DISCONTINUED | OUTPATIENT
Start: 2023-06-23 | End: 2023-06-24

## 2023-06-23 RX ORDER — FUROSEMIDE 10 MG/ML
40 INJECTION INTRAMUSCULAR; INTRAVENOUS DAILY
Status: DISCONTINUED | OUTPATIENT
Start: 2023-06-23 | End: 2023-06-26

## 2023-06-23 RX ADMIN — ACETYLCYSTEINE 4 ML: 100 INHALANT RESPIRATORY (INHALATION) at 07:06

## 2023-06-23 RX ADMIN — FENTANYL CITRATE 25 MCG: 50 INJECTION INTRAMUSCULAR; INTRAVENOUS at 01:06

## 2023-06-23 RX ADMIN — IPRATROPIUM BROMIDE AND ALBUTEROL SULFATE 3 ML: .5; 3 SOLUTION RESPIRATORY (INHALATION) at 04:06

## 2023-06-23 RX ADMIN — LEVETIRACETAM 1000 MG: 100 INJECTION, SOLUTION INTRAVENOUS at 09:06

## 2023-06-23 RX ADMIN — FENTANYL CITRATE 50 MCG: 50 INJECTION, SOLUTION INTRAMUSCULAR; INTRAVENOUS at 05:06

## 2023-06-23 RX ADMIN — OXYCODONE HYDROCHLORIDE 5 MG: 5 TABLET ORAL at 12:06

## 2023-06-23 RX ADMIN — FENTANYL CITRATE 25 MCG: 50 INJECTION, SOLUTION INTRAMUSCULAR; INTRAVENOUS at 05:06

## 2023-06-23 RX ADMIN — RIFAXIMIN 550 MG: 550 TABLET ORAL at 08:06

## 2023-06-23 RX ADMIN — OLANZAPINE 5 MG: 2.5 TABLET, FILM COATED ORAL at 09:06

## 2023-06-23 RX ADMIN — ACETYLCYSTEINE 4 ML: 100 INHALANT RESPIRATORY (INHALATION) at 04:06

## 2023-06-23 RX ADMIN — POTASSIUM CHLORIDE 60 MEQ: 7.46 INJECTION, SOLUTION INTRAVENOUS at 02:06

## 2023-06-23 RX ADMIN — OXYCODONE HYDROCHLORIDE 5 MG: 5 TABLET ORAL at 06:06

## 2023-06-23 RX ADMIN — ARIPIPRAZOLE 5 MG: 5 TABLET ORAL at 09:06

## 2023-06-23 RX ADMIN — OXYCODONE HYDROCHLORIDE 5 MG: 5 TABLET ORAL at 11:06

## 2023-06-23 RX ADMIN — SODIUM PHOSPHATE, MONOBASIC, MONOHYDRATE AND SODIUM PHOSPHATE, DIBASIC, ANHYDROUS 20.01 MMOL: 142; 276 INJECTION, SOLUTION INTRAVENOUS at 02:06

## 2023-06-23 RX ADMIN — FENTANYL CITRATE 25 MCG: 50 INJECTION INTRAMUSCULAR; INTRAVENOUS at 03:06

## 2023-06-23 RX ADMIN — IPRATROPIUM BROMIDE AND ALBUTEROL SULFATE 3 ML: .5; 3 SOLUTION RESPIRATORY (INHALATION) at 07:06

## 2023-06-23 RX ADMIN — ACETYLCYSTEINE 4 ML: 100 INHALANT RESPIRATORY (INHALATION) at 12:06

## 2023-06-23 RX ADMIN — SILODOSIN 4 MG: 4 CAPSULE ORAL at 09:06

## 2023-06-23 RX ADMIN — RIFAXIMIN 550 MG: 550 TABLET ORAL at 09:06

## 2023-06-23 RX ADMIN — FAMOTIDINE 20 MG: 20 TABLET, FILM COATED ORAL at 08:06

## 2023-06-23 RX ADMIN — MUPIROCIN: 20 OINTMENT TOPICAL at 09:06

## 2023-06-23 RX ADMIN — FUROSEMIDE 40 MG: 10 INJECTION, SOLUTION INTRAMUSCULAR; INTRAVENOUS at 12:06

## 2023-06-23 RX ADMIN — IPRATROPIUM BROMIDE AND ALBUTEROL SULFATE 3 ML: .5; 3 SOLUTION RESPIRATORY (INHALATION) at 12:06

## 2023-06-23 RX ADMIN — OLANZAPINE 5 MG: 2.5 TABLET, FILM COATED ORAL at 08:06

## 2023-06-23 RX ADMIN — SPIRONOLACTONE 50 MG: 25 TABLET, FILM COATED ORAL at 09:06

## 2023-06-23 RX ADMIN — FAMOTIDINE 20 MG: 20 TABLET, FILM COATED ORAL at 09:06

## 2023-06-23 RX ADMIN — LEVETIRACETAM 1000 MG: 100 INJECTION, SOLUTION INTRAVENOUS at 08:06

## 2023-06-23 RX ADMIN — TRAZODONE HYDROCHLORIDE 25 MG: 50 TABLET ORAL at 08:06

## 2023-06-23 NOTE — NURSING
Pt's sister is at bedside. Gave sister document with all outside facility choices that pt can be transferred to so she can look through it and pick a place. Sister was thankful and receptive.

## 2023-06-23 NOTE — PROCEDURES
DATE: 6/22/23    EEG NUMBER: FH -1    REFERRING PHYSICIAN:  Dr. Andrew     This EEG was performed to assess for subclinical seizures      ELECTROENCEPHALOGRAM REPORT     METHODOLOGY:  Electroencephalographic (EEG) recording is with electrodes placed according to the International 10-20 placement system.  Thirty two (32) channels of digital signal are simultaneously recorded from the scalp and may include EKG, EMG, and/or eye monitors.   Recording band pass was 0.1 to 512 hz.  Digital video recording of the patient is simultaneously recorded with the EEG.  The nursing staff report clinical symptoms and may press an event button when the patient has symptoms of clinical interest to the treating physicians.  EEG and video recording is stored and archived in digital format.  The entire recording is visually reviewed, and the times identified by computer analysis as being spikes or seizures are reviewed again.  Activation procedures which include photic stimulation, hyperventilation and instructing patients to perform simple task are done in selected patients.   Compresses spectral analysis (CSA) is also performed on the activity recorded from each individual channel.  This is displayed as a power display of frequencies from 0 to 30 Hz over time.   The CSA analysis is done and displayed continuously.  This is reviewed for asymmetries in power between homologous areas of the scalp and for presence of changes in power which can be seen when seizures occur.  Sections of suspected abnormalities on the CSA is then compared with the original EEG recording.                [employer] software was also utilized in the review of this study.  This software suite analyzes the EEG recording in multiple domains.  Coherence and rhythmicity is computed to identify EEG sections which may contain organized seizures.  Each channel undergoes analysis to detect presence of spike and sharp waves which have special and morphological  characteristic of epileptic activity.  The routine EEG recording is converted from spacial into frequency domain.  This is then displayed comparing homologous areas to identify areas of significant asymmetry.  Algorithm to identify non-cortically generated artifact is used to separate eye movement, EMG and other artifact from the EEG.     Recording times   Start on June 22, 2023 at hours 16 minute 41 seconds 31  End on June 23 2023 at hours 7 minute 0 seconds 2   Start on June 23, 2023 at hours 7 minute 0 seconds 47  End on June 22, 2023 at hours 14 minute 35 seconds 32  The total time of EEG recording for the study was 21 hours and 39 minutes    EEG FINDINGS:  The recording was obtained with a number of standard bipolar and referential montages during wakefulness, drowsiness and sleep.  In the alert state the background was mildly disorganized with a nonsustained posterior dominant rhythm of up to 7 hertz which was symmetric.  Mixed theta range slowing was noted in both hemispheres.  During drowsiness generalized slowing of the background noted.  During stage 2 sleep symmetric spindles were noted.   There were no interictal epileptiform abnormalities and no clinical or electrographic seizures were recorded.  Movement and tremor related artifacts were noted throughout the study.    The EKG channel revealed a sinus rhythm.     IMPRESSION:  This is an abnormal EEG during wakefulness, drowsiness and sleep.  Diffuse disorganized low-amplitude slowing of the background was noted     CLINICAL CORRELATION:  The patient is a 58-year-old female with a history of hepatic cirrhosis currently maintained on Keppra.  Patient also received intravenous propofol prior to the study.  This is an abnormal EEG during wakefulness, drowsiness and sleep.  The overall degree of disorganization and slowing for given age is suggestive of a mild encephalopathy, likely a toxic metabolic encephalopathy.  There is no evidence of an epileptic  process on this recording.  No seizures were recorded during this study.

## 2023-06-23 NOTE — NURSING
Pt's sister called and was informed of pt being extubated and doing well on CPAP and that she will be transitioned to RA. CPAP overnight. Sister plans to visit today.

## 2023-06-23 NOTE — SUBJECTIVE & OBJECTIVE
Interval History:  Extubated this AM to CPAP. Doing well. Cryo x1 given for very low fibrinogen and active oozing/bleeding from multiple IV sites. EEG remains negative for seizures.     Review of Systems   Reason unable to perform ROS: intubated/CPAP.     Objective:     Vitals:  Temp: 97.9 °F (36.6 °C)  Pulse: 97  BP: 132/68  MAP (mmHg): 95  Resp: 17  SpO2: (!) 93 %  Oxygen Concentration (%): 40  Vent Mode: Spont  Set Rate: 16 BPM  Vt Set: 440 mL  Pressure Support: 5 cmH20  PEEP/CPAP: 5 cmH20  Peak Airway Pressure: 10 cmH20  Mean Airway Pressure: 7.9 cmH20  Plateau Pressure: 25 cmH20    Temp  Min: 97.2 °F (36.2 °C)  Max: 100.2 °F (37.9 °C)  Pulse  Min: 72  Max: 108  BP  Min: 98/50  Max: 189/72  MAP (mmHg)  Min: 71  Max: 114  Resp  Min: 13  Max: 36  SpO2  Min: 93 %  Max: 100 %  Oxygen Concentration (%)  Min: 40  Max: 50    06/22 0701 - 06/23 0700  In: 1756.3 [I.V.:23]  Out: 1400 [Urine:1400]   Unmeasured Output  Urine Occurrence: 1  Stool Occurrence: 0  Emesis Occurrence: 0  Pad Count: 1        Physical Exam  Vitals and nursing note reviewed.   Constitutional:       Appearance: She is ill-appearing.   HENT:      Head: Normocephalic.      Nose: Nose normal.      Mouth/Throat:      Mouth: Mucous membranes are moist.      Pharynx: Oropharynx is clear.   Eyes:      Pupils: Pupils are equal, round, and reactive to light.   Cardiovascular:      Rate and Rhythm: Normal rate and regular rhythm.      Pulses: Normal pulses.      Heart sounds: Normal heart sounds.   Pulmonary:      Effort: Pulmonary effort is normal.      Breath sounds: Normal breath sounds.   Abdominal:      General: Bowel sounds are normal.      Palpations: Abdomen is soft.   Musculoskeletal:         General: Swelling present.   Skin:     General: Skin is warm and dry.      Capillary Refill: Capillary refill takes 2 to 3 seconds.   Neurological:      General: No focal deficit present.      Comments: Drowsy, oriented x1, FC in all four extremities. CN intact       R arm swollen, bruised, dependent edema of left hand.       Unable to test orientation, language, memory, judgment, insight, fund of knowledge, hearing, shoulder shrug, tongue protrusion, coordination, gait due to level of consciousness.       Medications:  Continuous Scheduledacetylcysteine 100 mg/ml (10%), 4 mL, Q8H  albuterol-ipratropium, 3 mL, Q8H  ARIPiprazole, 5 mg, Daily  famotidine, 20 mg, BID  furosemide (LASIX) injection, 40 mg, Daily  levETIRAcetam (Keppra) IV (PEDS and ADULTS), 1,000 mg, Q12H  mupirocin, , Daily  OLANZapine, 5 mg, BID  oxyCODONE, 5 mg, Q6H  rifAXIMin, 550 mg, BID  silodosin, 4 mg, Daily  spironolactone, 50 mg, Daily  traZODone, 25 mg, QHS    PRNsodium chloride, , Q24H PRN  sodium chloride, , Q24H PRN  sodium chloride, , Q24H PRN  dextrose 10%, 12.5 g, PRN  dextrose 10%, 25 g, PRN  dextrose, 15 g, PRN  dextrose, 30 g, PRN  fentaNYL, 50 mcg, Q1H PRN  magnesium sulfate IVPB, 2 g, PRN  magnesium sulfate IVPB, 4 g, PRN  naloxone, 0.02 mg, PRN  ondansetron, 4 mg, Q8H PRN  potassium chloride, 40 mEq, PRN   And  potassium chloride, 60 mEq, PRN   And  potassium chloride, 80 mEq, PRN  racepinephrine, 0.5 mL, Q4H PRN  simethicone, 1 tablet, QID PRN  sodium chloride 0.9%, 10 mL, Q8H PRN  sodium phosphate IVPB, 15 mmol, PRN  sodium phosphate IVPB, 20.01 mmol, PRN  sodium phosphate IVPB, 30 mmol, PRN      Today I personally reviewed pertinent medications, lines/drains/airways, imaging, cardiology results, laboratory results, microbiology results,     Diet  No diet orders on file  No diet orders on file

## 2023-06-23 NOTE — NURSING
Pt extubated at 1309 on CPAP sating 100%. Pt doing well after extubation. Ordered to do one hour of CPAP and providers will reevaluate to see if she can be weaned off.     Attempted to call sister Zaria twice. No answer. Will try and call later again.

## 2023-06-23 NOTE — PROGRESS NOTES
[facility]  Adult Nutrition  Progress Note    SUMMARY       Recommendations    1. Continue TF regimen of Impact Peptide 1.5 @ 40 ml/hr to provide 1440 kcal, 90 g pro, 739 ml water.   2. RD following.     Goals: Meet % EEN by RD f/u.  Nutrition Goal Status: goal met  Communication of RD Recs: other (comment) (POC)    Assessment and Plan    Severe protein-calorie malnutrition  Malnutrition Type:  Context: social/environmental circumstances  Level: severe    Related to (etiology):   H/o ETOH abuse     Signs and Symptoms (as evidenced by):   Findings of NFPE, wt loss, edema present, and inadequate protein-energy intake     Malnutrition Characteristic Summary:  Weight Loss (Malnutrition):  (23% x 4 months)  Energy Intake (Malnutrition): less than or equal to 75% for greater than or equal to 1 month  Subcutaneous Fat (Malnutrition): severe depletion (suspecting)  Muscle Mass (Malnutrition): severe depletion  Fluid Accumulation (Malnutrition): other (see comments) (mild-moderate)    Interventions/Recommendations (treatment strategy):  1. Continue TF regimen of Impact Peptide 1.5 @ 40 ml/hr to provide 1440 kcal, 90 g pro, 739 ml water.   2. RD following.     Nutrition Diagnosis Status:   Continues    Malnutrition Assessment  Malnutrition Context: social/environmental circumstances  Malnutrition Level: severe    Weight Loss (Malnutrition):  (23% x 4 months)  Energy Intake (Malnutrition): less than or equal to 75% for greater than or equal to 1 month  Subcutaneous Fat (Malnutrition): severe depletion (suspecting)  Muscle Mass (Malnutrition): severe depletion  Fluid Accumulation (Malnutrition): other (see comments) (mild-moderate)   Orbital Region (Subcutaneous Fat Loss): severe depletion  Upper Arm Region (Subcutaneous Fat Loss): severe depletion   Anglican Region (Muscle Loss): severe depletion  Clavicle Bone Region (Muscle Loss): severe depletion  Clavicle and Acromion Bone Region (Muscle Loss):  "severe depletion  Scapular Bone Region (Muscle Loss): severe depletion  Patellar Region (Muscle Loss): severe depletion  Anterior Thigh Region (Muscle Loss): severe depletion  Posterior Calf Region (Muscle Loss): severe depletion     Reason for Assessment    Reason For Assessment: RD follow-up  Diagnosis: other (see comments) (Non-convulsive status epilepticus)  Relevant Medical History: addiction to drugs, ETOH abuse, anemia, anxiety, behavioral disorder, bipolar disorder, cirrhosis, hypotension, glaucoma, hx of seizure  Interdisciplinary Rounds: did not attend  General Information Comments: RD f/u. Unable to speak with pt 2/2 remaining intubated. RD suspecting poor PO intake PTA. NPO- noted TF regimen currently running.  Wt on 5/5/23 was 99# and # - RD suspecting wt gain 2/2 +3 (moderate) edema in arms present and +2 (mild) edema present in BLEs. Wt on 2/8/23 was 129#, using wt of 99# from 5/5/23- indicates 23% wt loss x 4 months. NFPE completed 5/25 and found severe fat and muscle wasting. Severe malnutrition dxn continues. LBM 6/21.  Nutrition Discharge Planning: Pending clinical course    Nutrition Risk Screen    Nutrition Risk Screen: tube feeding or parenteral nutrition, difficulty chewing/swallowing, unintentional loss of 10 lbs or more in the past 2 months    Nutrition/Diet History    Spiritual, Cultural Beliefs, Restorationism Practices, Values that Affect Care: no  Food Allergies: NKFA  Factors Affecting Nutritional Intake: NPO, on mechanical ventilation, excessive alcohol intake, difficulty/impaired swallowing    Anthropometrics    Temp: 97.9 °F (36.6 °C)  Height: 5' 2" (157.5 cm)  Height (inches): 62 in  Weight Method: Bed Scale  Weight: 59.9 kg (132 lb)  Weight (lb): 132 lb  Ideal Body Weight (IBW), Female: 110 lb  % Ideal Body Weight, Female (lb): 120 %  BMI (Calculated): 24.1  BMI Grade: 18.5-24.9 - normal    Lab/Procedures/Meds    Pertinent Labs Reviewed: reviewed  Pertinent Labs Comments: Sodium " 147, Potassium 3.3, Creatinine 0.4, Calcium 8.0, Phos 1.8, Albumin 2.5, T Bili 7.6, A1C <4.0%  Pertinent Medications Reviewed: reviewed  Pertinent Medications Comments: famotidine, furosemide, spironolactone    Estimated/Assessed Needs    Weight Used For Calorie Calculations: 59.9 kg (132 lb 0.9 oz)  Energy Calorie Requirements (kcal): 1509 kcal  Energy Need Method: [place] (x 1.3 SF)  Protein Requirements: 90 g (1.5 g/kg)  Weight Used For Protein Calculations: 59.9 kg (132 lb 0.9 oz)  Fluid Requirements (mL): 1 mL/kcal or fluid per MD  Estimated Fluid Requirement Method: RDA Method  RDA Method (mL): 1509    Nutrition Prescription Ordered    Current Diet Order: NPO  Current Nutrition Support Formula Ordered: Impact Peptide 1.5  Current Nutrition Support Rate Ordered: 40 (ml)  Current Nutrition Support Frequency Ordered: mL/hr    Evaluation of Received Nutrient/Fluid Intake    Enteral Calories (kcal): 1440  Enteral Protein (gm): 90  Enteral (Free Water) Fluid (mL): 739  % Kcal Needs: 95%  % Protein Needs: 100%  I/O: +6.3 L since 6/9  Energy Calories Required: meeting needs  Protein Required: meeting needs  Tolerance: tolerating  % Intake of Estimated Energy Needs: 95%  % Meal Intake: NPO    Nutrition Risk    Level of Risk/Frequency of Follow-up:  (1 time/week)     Monitor and Evaluation    Food and Nutrient Intake: enteral nutrition intake  Food and Nutrient Adminstration: enteral and parenteral nutrition administration  Knowledge/Beliefs/Attitudes: food and nutrition knowledge/skill, beliefs and attitudes  Physical Activity and Function: factors affecting access to physical activity  Anthropometric Measurements: height/length, weight, weight change, body mass index  Biochemical Data, Medical Tests and Procedures: electrolyte and renal panel, gastrointestinal profile, glucose/endocrine profile, inflammatory profile, lipid profile  Nutrition-Focused Physical Findings: overall appearance, extremities, muscles and  bones, head and eyes, skin     Nutrition Follow-Up    RD Follow-up?: Yes    Nuzhat Colon RDN,LDN

## 2023-06-23 NOTE — PLAN OF CARE
[facility] Care Plan    POC reviewed with Marely Hamilton and family at 1400. Questions and concerns addressed. No acute events today. Pt progressing toward goals. See below and flowsheets for full assessment and VS info.     Gave 1U of platelets   Prop stopped  Placed on cEEG   Warming blanket placed for temp of 95 this morning  Started tube feeds  Wound care consulted for upper LE bruising   Updated sister in person; Cristela NP also came to bedside to talk to sister and update her on what happened. Notified charge nurse and night shift nurse to  be sure to update sister whenever any updates are needed especially overnight.   Q4h neuro checks; pt has sluggish pupils. Providers aware.   Lasix 40mg given w/ good U/O     Is this a stroke patient? no    Neuro:  Cheryl Coma Scale  Best Eye Response: 1-->(E1) none  Best Motor Response: 5-->(M5) localizes pain  Best Verbal Response: 1-->(V1) none (intubated)  Jesse Coma Scale Score: 7  Assessment Qualifiers: patient intubated, patient chemically sedated or paralyzed  Pupil PERRLA: no     24 hr Temp:  [94.1 °F (34.5 °C)-99.5 °F (37.5 °C)]     CV:   Rhythm: sinus tachycardia  BP goals:   SBP < 160  MAP > 65    Resp:      Vent Mode: A/C  Set Rate: 16 BPM  Oxygen Concentration (%): 50  Vt Set: 440 mL  PEEP/CPAP: 10 cmH20  Pressure Support: 10 cmH20    Plan: wean to extubate    GI/:     Diet/Nutrition Received: NPO, tube feeding  Last Bowel Movement: 06/21/23  Voiding Characteristics: external catheter    Intake/Output Summary (Last 24 hours) at 6/22/2023 1928  Last data filed at 6/22/2023 1801  Gross per 24 hour   Intake 1119.64 ml   Output 1600 ml   Net -480.36 ml     Unmeasured Output  Urine Occurrence: 1  Stool Occurrence: 0  Emesis Occurrence: 0  Pad Count: 2    Labs/Accuchecks:  Recent Labs   Lab 06/22/23  0112   WBC 4.25   RBC 2.74*   HGB 8.1*   HCT 27.1*   PLT 35*      Recent Labs   Lab 06/22/23  0112   *   K 3.5   CO2 21*   *   BUN 7   CREATININE 0.3*    ALKPHOS 112   ALT 27   AST 44*   BILITOT 8.7*      Recent Labs   Lab 06/21/23  0912 06/22/23  0112   INR  --  1.9*   APTT 33.8*  --     No results for input(s): CPK, CPKMB, TROPONINI, MB in the last 168 hours.    Electrolytes:   Accuchecks: none    Gtts:      LDA/Wounds:  Lines/Drains/Airways       Drain  Duration                  NG/OG Tube 06/22/23 0500 Center mouth <1 day    Female External Urinary Catheter 06/22/23 0801 <1 day              Airway  Duration                Airway Anesthesia 06/22/23 <1 day              Peripheral Intravenous Line  Duration                  Peripheral IV - Single Lumen 06/15/23 1229 20 G;1 3/4 in Right Lower leg 7 days         Peripheral IV - Single Lumen 06/21/23 0012 20 G Anterior;Left Upper Arm 1 day         Peripheral IV - Single Lumen 06/21/23 0013 22 G Left;Posterior Wrist 1 day                  Wounds: Yes  Wound care consulted: Yes

## 2023-06-23 NOTE — PLAN OF CARE
[facility] Care Plan    POC reviewed with Marely Hamilton and family at 0300. Pt verbalized understanding. Questions and concerns addressed. No acute events overnight. Pt progressing toward goals. Will continue to monitor. See below and flowsheets for full assessment and VS info.     - Bath completed   - Fent given x4  - TB feed @ goal of 40   - K & Phos replaced    Is this a stroke patient? no    Neuro:  Cheryl Coma Scale  Best Eye Response: 1-->(E1) none  Best Motor Response: 5-->(M5) localizes pain  Best Verbal Response: 1-->(V1) none  Cheryl Coma Scale Score: 7  Assessment Qualifiers: patient intubated  Pupil PERRLA: no     24hr Temp:  [94.1 °F (34.5 °C)-100.2 °F (37.9 °C)]     CV:   Rhythm: normal sinus rhythm  BP goals:   SBP < 180  MAP > 65    Resp:      Vent Mode: A/C  Set Rate: 16 BPM  Oxygen Concentration (%): 50  Vt Set: 440 mL  PEEP/CPAP: 10 cmH20  Pressure Support: 10 cmH20    Plan: wean to extubate    GI/:     Diet/Nutrition Received: NPO, tube feeding  Last Bowel Movement: 06/21/23  Voiding Characteristics: external catheter    Intake/Output Summary (Last 24 hours) at 6/23/2023 0305  Last data filed at 6/23/2023 0001  Gross per 24 hour   Intake 1353.83 ml   Output 1450 ml   Net -96.17 ml     Unmeasured Output  Urine Occurrence: 1  Stool Occurrence: 0  Emesis Occurrence: 0  Pad Count: 1    Labs/Accuchecks:  Recent Labs   Lab 06/23/23  0026   WBC 2.56*   RBC 2.30*   HGB 6.7*   HCT 21.9*   PLT 33*      Recent Labs   Lab 06/23/23  0026   *   K 3.3*   CO2 21*   *   BUN 8   CREATININE 0.4*   ALKPHOS 99   ALT 21   AST 40   BILITOT 7.6*      Recent Labs   Lab 06/21/23  0912 06/22/23  0112 06/23/23  0026   INR  --    < > 1.8*   APTT 33.8*  --   --     < > = values in this interval not displayed.    No results for input(s): CPK, CPKMB, TROPONINI, MB in the last 168 hours.    Electrolytes: Electrolytes replaced  Accuchecks: none    Gtts:      LDA/Wounds:  Lines/Drains/Airways       Drain   Duration                  NG/OG Tube 06/22/23 0500 Center mouth <1 day    Female External Urinary Catheter 06/22/23 0801 <1 day              Airway  Duration                Airway Anesthesia 06/22/23 1 day              Peripheral Intravenous Line  Duration                  Peripheral IV - Single Lumen 06/15/23 1229 20 G;1 3/4 in Right Lower leg 7 days         Peripheral IV - Single Lumen 06/21/23 0012 20 G Anterior;Left Upper Arm 2 days         Peripheral IV - Single Lumen 06/21/23 0013 22 G Left;Posterior Wrist 2 days                  Wounds: yes  Wound care consulted: Yes   Problem: Adult Inpatient Plan of Care  Goal: Absence of Hospital-Acquired Illness or Injury  Intervention: Prevent Skin Injury  Flowsheets (Taken 6/23/2023 0305)  Body Position: turned  Skin Protection:   adhesive use limited   skin-to-device areas padded   skin-to-skin areas padded   transparent dressing maintained   tubing/devices free from skin contact  Goal: Optimal Comfort and Wellbeing  Intervention: Provide Person-Centered Care  Flowsheets (Taken 6/23/2023 0305)  Trust Relationship/Rapport: care explained     Problem: Fluid and Electrolyte Imbalance (Acute Kidney Injury/Impairment)  Goal: Fluid and Electrolyte Balance  Intervention: Monitor and Manage Fluid and Electrolyte Balance  Flowsheets (Taken 6/23/2023 0305)  Fluid/Electrolyte Management: fluids restricted

## 2023-06-23 NOTE — PT/OT/SLP PROGRESS
Physical Therapy      Patient Name:  Marely Hamilton   MRN:  [id number]    Patient not seen today secondary to  (pt intubated, not appropriate for OOB mobility). Unable to attempt in PM once extubated. Will follow-up as appropriate.

## 2023-06-23 NOTE — PLAN OF CARE
[facility] Care Plan    POC reviewed with Marely Hamilton and family at 1400. Pt verbalized understanding. Questions and concerns addressed. No acute events today. Pt progressing toward goals. Will continue to monitor. See below and flowsheets for full assessment and VS info.     - extubated today to CPAP and now on room air  - potassium and phos replaced  - pending damon  - confused but oriented to self, year and sometimes place, not situation. Says odd things though.   - Sister updated and provided handout from [facility].   - 1U of cryo given   - 40 lasix - 2100 u/o total today        Is this a stroke patient? no    Neuro:  Wray Coma Scale  Best Eye Response: 4-->(E4) spontaneous  Best Motor Response: 6-->(M6) obeys commands  Best Verbal Response: 4-->(V4) confused  Wray Coma Scale Score: 14  Assessment Qualifiers: patient not sedated/intubated  Pupil PERRLA: no     24 hr Temp:  [96.4 °F (35.8 °C)-100.2 °F (37.9 °C)]     CV:   Rhythm: normal sinus rhythm, sinus tachycardia  BP goals:   SBP < 180  MAP > 65    Resp:      Vent Mode: Spont  Set Rate: 16 BPM  Oxygen Concentration (%): 40  Vt Set: 440 mL  PEEP/CPAP: 5 cmH20  Pressure Support: 5 cmH20    Plan: N/A    GI/:     Diet/Nutrition Received: NPO  Last Bowel Movement: 06/21/23  Voiding Characteristics: external catheter    Intake/Output Summary (Last 24 hours) at 6/23/2023 1846  Last data filed at 6/23/2023 1831  Gross per 24 hour   Intake 1904.78 ml   Output 2700 ml   Net -795.22 ml     Unmeasured Output  Urine Occurrence: 1  Stool Occurrence: 0  Emesis Occurrence: 0  Pad Count: 1    Labs/Accuchecks:  Recent Labs   Lab 06/23/23  1043   WBC 2.78*   RBC 2.42*   HGB 7.2*   HCT 23.3*   PLT 41*      Recent Labs   Lab 06/23/23  0026   *   K 3.3*   CO2 21*   *   BUN 8   CREATININE 0.4*   ALKPHOS 99   ALT 21   AST 40   BILITOT 7.6*      Recent Labs   Lab 06/21/23  0912 06/22/23  0112 06/23/23  0026   INR  --    < > 1.8*   APTT 33.8*  --   --     < > = values  in this interval not displayed.    No results for input(s): CPK, CPKMB, TROPONINI, MB in the last 168 hours.    Electrolytes: Electrolytes replaced  Accuchecks: none    Gtts:      LDA/Wounds:  Lines/Drains/Airways       Drain  Duration             Female External Urinary Catheter 06/22/23 0801 1 day              Peripheral Intravenous Line  Duration                  Peripheral IV - Single Lumen 06/15/23 1229 20 G;1 3/4 in Right Lower leg 8 days         Peripheral IV - Single Lumen 06/21/23 0012 20 G Anterior;Left Upper Arm 2 days         Peripheral IV - Single Lumen 06/21/23 0013 22 G Left;Posterior Wrist 2 days                  Wounds: Yes  Wound care consulted: yes

## 2023-06-23 NOTE — CONSULTS
[facility]  Wound Care    Patient Name:  Marely Hamilton   MRN:  [id number]  Date: 6/23/2023  Diagnosis: Non-convulsive status epilepticus    History:     Past Medical History:   Diagnosis Date    Addiction to drug     Alcohol abuse     Alcohol abuse, in remission 6/15/2015    14.5 weeks ago; AA weekly    Anemia     Anxiety 6/15/2015    Behavioral problem     Bipolar disorder     Bipolar disorder in remission 6/15/2015    Cirrhosis, Laennec's 6/15/2015    Depression     Encounter for blood transfusion     Epistaxis 6/15/2015    Fatigue     Glaucoma     Hematuria     Hepatic encephalopathy 6/15/2015    Hepatic enlargement     History of psychiatric care     History of psychiatric hospitalization     History of seizure 6/15/2015    1    hx of intentional Tylenol overdose 6/15/2015    2005- situational and hx of bipolar    Hx of psychiatric care     Macrocytic anemia 9/18/2015    6 units PRBC since June 2015    Jeana     Osteoarthritis     Other ascites 6/15/2015    Psychiatric exam requested by authority     Psychiatric problem     Psychosis 9/26/2019    Renal disorder     Seizures     Self-harming behavior     Suicide attempt     Therapy     Thrombocytopenia 6/15/2015       Social History     Socioeconomic History    Marital status: Single   Occupational History    Occupation: Disabled     Employer: DISABLED   Tobacco Use    Smoking status: Never    Smokeless tobacco: Never   Substance and Sexual Activity    Alcohol use: Not Currently     Comment: hx of ETOH abuse with cirrhosis of liver    Drug use: No    Sexual activity: Not Currently   Other Topics Concern    Patient feels they ought to cut down on drinking/drug use No    Patient annoyed by others criticizing their drinking/drug use No    Patient has felt bad or guilty about drinking/drug use No    Patient has had a drink/used drugs as an eye opener in the AM No   Social History Narrative    Pt has 1 older and 1 younger sister.  Her father  committed suicide when she was 18.  She has a [employer] degree and worked as an , but she has been disabled since age 40.  She never  and has no children.  She is not dating and claims no current friends.  She currently lives with her mother along with her pet dog.  She denies any hobbies and says she is not Islam.     Social Determinants of Health     Financial Resource Strain: Unknown    Difficulty of Paying Living Expenses: Patient refused   Food Insecurity: Unknown    Worried About Running Out of Food in the Last Year: Patient refused    Ran Out of Food in the Last Year: Patient refused   Transportation Needs: Unknown    Lack of Transportation (Medical): Patient refused    Lack of Transportation (Non-Medical): Patient refused   Physical Activity: Unknown    Days of Exercise per Week: Patient refused    Minutes of Exercise per Session: Patient refused   Stress: Unknown    Feeling of Stress : Patient refused   Social Connections: Unknown    Frequency of Communication with Friends and Family: Patient refused    Frequency of Social Gatherings with Friends and Family: Patient refused    Attends Temple Services: Patient refused    Active Member of Clubs or Organizations: Patient refused    Attends Club or Organization Meetings: Patient refused    Marital Status: Patient refused   Housing Stability: Unknown    Unable to Pay for Housing in the Last Year: Patient refused    Number of Places Lived in the Last Year: 1    Unstable Housing in the Last Year: Patient refused       Precautions:     Allergies as of 05/27/2023 - Reviewed 05/21/2023   Allergen Reaction Noted    Sulfa (sulfonamide antibiotics) Rash 11/26/2014    Codeine Nausea And Vomiting 04/26/2018       [facility] Assessment Details/Treatment     Patient seen for wound care consultation for ANDREW.   Reviewed chart for this encounter.   See Flow Sheet for findings.    Pt found lying in bed, agreeable to care at this time. On assessment, pt has generalized  bruising and edema noted to bilateral arms R>L. To ANDREW, pt has a PIV site covered in central dressing - dressing full of serosanguinous fluid d/t pt weeping fluids. Bedside RN states she has already changed dressing this shift - recommend removal at this time, remaining PIV sites look OK. Pt has scattered skin tears noted to BL arms, weeping serosanguinous fluid. Left bedside RN w/ Xeroform non-adherent, foam dressings, cavelion barrier spray for periwound protection and adhesive remover to make dressings easier to remove w/o further harming skin.     RECOMMENDATIONS:  Q3 days/PRN for saturation - BL arm skin tears - cleanse w/ NS, pat dry. Apply Xeroform or Petroleum guaze to wound bed and cover w/ Island boarder or Foam dressing. Keep arms elevated. Use adhesive remover for dressing takedown.    Discussed POC with patient and primary nurse.   See EMR for orders & patient education.        Nursing to continue care.  Nursing to maintain pressure injury prevention interventions.           06/23/23 1427   WOCN Assessment   WOCN Total Time (mins) 15   Visit Date 06/23/23   Visit Time 1427   Consult Type New   WOCN Speciality Wound   Intervention assessed;changed;applied;chart review;coordination of care;orders   Teaching on-going        Altered Skin Integrity 06/02/23 2130 Right lower;dorsal Arm Skin Tear   Date First Assessed/Time First Assessed: 06/02/23 2130   Altered Skin Integrity Present on Admission - Did Patient arrive to the hospital with altered skin?: yes  Side: Right  Orientation: lower;dorsal  Location: Arm  Is this injury device related?: N...   Wound Image     Dressing Appearance Moist drainage   Drainage Amount Small   Drainage Characteristics/Odor Serosanguineous   Appearance Pink;Red;Moist   Tissue loss description Partial thickness   Periwound Area Edematous;Other (see comments)  (purple)   Dressing Applied;Non-adherent;Foam        Altered Skin Integrity 06/03/23 1900 Left  anterior;distal;upper;medial Arm Partial thickness tissue loss. Shallow open ulcer with a red or pink wound bed, without slough. Intact or Open/Ruptured Serum-filled blister.   Date First Assessed/Time First Assessed: 06/03/23 1900   Altered Skin Integrity Present on Admission - Did Patient arrive to the hospital with altered skin?: yes  Side: Left  Orientation: anterior;distal;upper;medial  Location: Arm  Description of Alt...   Wound Image    Dressing Appearance Saturated   Drainage Amount Moderate   Drainage Characteristics/Odor Serosanguineous   Appearance Pink;Red;Wet   Tissue loss description Partial thickness

## 2023-06-23 NOTE — PT/OT/SLP PROGRESS
Occupational Therapy      Patient Name:  Marely Hamilton   MRN:  [id number]    Patient not seen today secondary to pt is intubated and sedated. Will follow-up next OT visit.    6/23/2023

## 2023-06-24 LAB
ALBUMIN SERPL BCP-MCNC: 2.5 G/DL (ref 3.5–5.2)
ALP SERPL-CCNC: 102 U/L (ref 55–135)
ALT SERPL W/O P-5'-P-CCNC: 24 U/L (ref 10–44)
ANION GAP SERPL CALC-SCNC: 12 MMOL/L (ref 8–16)
AST SERPL-CCNC: 50 U/L (ref 10–40)
BILIRUB SERPL-MCNC: 7.5 MG/DL (ref 0.1–1)
BUN SERPL-MCNC: 10 MG/DL (ref 6–20)
CALCIUM SERPL-MCNC: 8.1 MG/DL (ref 8.7–10.5)
CHLORIDE SERPL-SCNC: 115 MMOL/L (ref 95–110)
CO2 SERPL-SCNC: 20 MMOL/L (ref 23–29)
CREAT SERPL-MCNC: 0.4 MG/DL (ref 0.5–1.4)
EST. GFR  (NO RACE VARIABLE): >60 ML/MIN/1.73 M^2
FIBRINOGEN PPP-MCNC: 131 MG/DL (ref 182–400)
GLUCOSE SERPL-MCNC: 96 MG/DL (ref 70–110)
INR PPP: 2.5 (ref 0.8–1.2)
MAGNESIUM SERPL-MCNC: 1.8 MG/DL (ref 1.6–2.6)
MAGNESIUM SERPL-MCNC: 1.8 MG/DL (ref 1.6–2.6)
PHOSPHATE SERPL-MCNC: 2.3 MG/DL (ref 2.7–4.5)
POTASSIUM SERPL-SCNC: 3.6 MMOL/L (ref 3.5–5.1)
POTASSIUM SERPL-SCNC: 4.2 MMOL/L (ref 3.5–5.1)
PROT SERPL-MCNC: 5.1 G/DL (ref 6–8.4)
PROTHROMBIN TIME: 25.3 SEC (ref 9–12.5)
SODIUM SERPL-SCNC: 147 MMOL/L (ref 136–145)

## 2023-06-24 PROCEDURE — 83735 ASSAY OF MAGNESIUM: CPT

## 2023-06-24 PROCEDURE — 63600175 PHARM REV CODE 636 W HCPCS: Performed by: PHYSICIAN ASSISTANT

## 2023-06-24 PROCEDURE — 95714 VEEG EA 12-26 HR UNMNTR: CPT

## 2023-06-24 PROCEDURE — 84100 ASSAY OF PHOSPHORUS: CPT

## 2023-06-24 PROCEDURE — 95720 PR EEG, W/VIDEO, CONT RECORD, I&R, >12<26 HRS: ICD-10-PCS | Mod: ,,, | Performed by: PSYCHIATRY & NEUROLOGY

## 2023-06-24 PROCEDURE — 94660 CPAP INITIATION&MGMT: CPT

## 2023-06-24 PROCEDURE — 94668 MNPJ CHEST WALL SBSQ: CPT

## 2023-06-24 PROCEDURE — 20000000 HC ICU ROOM

## 2023-06-24 PROCEDURE — 95720 EEG PHY/QHP EA INCR W/VEEG: CPT | Mod: ,,, | Performed by: PSYCHIATRY & NEUROLOGY

## 2023-06-24 PROCEDURE — 63600175 PHARM REV CODE 636 W HCPCS

## 2023-06-24 PROCEDURE — 94640 AIRWAY INHALATION TREATMENT: CPT

## 2023-06-24 PROCEDURE — 94761 N-INVAS EAR/PLS OXIMETRY MLT: CPT

## 2023-06-24 PROCEDURE — 25000003 PHARM REV CODE 250: Performed by: PSYCHIATRY & NEUROLOGY

## 2023-06-24 PROCEDURE — 27000221 HC OXYGEN, UP TO 24 HOURS

## 2023-06-24 PROCEDURE — 25000003 PHARM REV CODE 250: Performed by: NURSE PRACTITIONER

## 2023-06-24 PROCEDURE — 99291 PR CRITICAL CARE, E/M 30-74 MINUTES: ICD-10-PCS | Mod: ,,, | Performed by: PSYCHIATRY & NEUROLOGY

## 2023-06-24 PROCEDURE — 85610 PROTHROMBIN TIME: CPT | Performed by: NURSE PRACTITIONER

## 2023-06-24 PROCEDURE — 25000242 PHARM REV CODE 250 ALT 637 W/ HCPCS: Performed by: PSYCHIATRY & NEUROLOGY

## 2023-06-24 PROCEDURE — 83735 ASSAY OF MAGNESIUM: CPT | Mod: 91 | Performed by: PSYCHIATRY & NEUROLOGY

## 2023-06-24 PROCEDURE — 99291 CRITICAL CARE FIRST HOUR: CPT | Mod: ,,, | Performed by: PSYCHIATRY & NEUROLOGY

## 2023-06-24 PROCEDURE — 99900035 HC TECH TIME PER 15 MIN (STAT)

## 2023-06-24 PROCEDURE — 85384 FIBRINOGEN ACTIVITY: CPT | Performed by: NURSE PRACTITIONER

## 2023-06-24 PROCEDURE — 84132 ASSAY OF SERUM POTASSIUM: CPT | Performed by: PSYCHIATRY & NEUROLOGY

## 2023-06-24 PROCEDURE — 80053 COMPREHEN METABOLIC PANEL: CPT

## 2023-06-24 RX ORDER — OLANZAPINE 2.5 MG/1
5 TABLET ORAL 2 TIMES DAILY
Status: DISCONTINUED | OUTPATIENT
Start: 2023-06-24 | End: 2023-06-27

## 2023-06-24 RX ORDER — SILODOSIN 4 MG/1
4 CAPSULE ORAL DAILY
Status: DISCONTINUED | OUTPATIENT
Start: 2023-06-25 | End: 2023-06-30 | Stop reason: HOSPADM

## 2023-06-24 RX ORDER — OXYCODONE HYDROCHLORIDE 5 MG/1
5 TABLET ORAL EVERY 6 HOURS
Status: DISCONTINUED | OUTPATIENT
Start: 2023-06-24 | End: 2023-06-25

## 2023-06-24 RX ORDER — SPIRONOLACTONE 25 MG/1
50 TABLET ORAL DAILY
Status: DISCONTINUED | OUTPATIENT
Start: 2023-06-25 | End: 2023-06-30 | Stop reason: HOSPADM

## 2023-06-24 RX ORDER — ARIPIPRAZOLE 5 MG/1
5 TABLET ORAL DAILY
Status: DISCONTINUED | OUTPATIENT
Start: 2023-06-25 | End: 2023-06-27

## 2023-06-24 RX ORDER — FAMOTIDINE 20 MG/1
20 TABLET, FILM COATED ORAL 2 TIMES DAILY
Status: DISCONTINUED | OUTPATIENT
Start: 2023-06-24 | End: 2023-06-27

## 2023-06-24 RX ADMIN — OLANZAPINE 5 MG: 2.5 TABLET, FILM COATED ORAL at 09:06

## 2023-06-24 RX ADMIN — IPRATROPIUM BROMIDE AND ALBUTEROL SULFATE 3 ML: .5; 3 SOLUTION RESPIRATORY (INHALATION) at 07:06

## 2023-06-24 RX ADMIN — FAMOTIDINE 20 MG: 20 TABLET, FILM COATED ORAL at 08:06

## 2023-06-24 RX ADMIN — LEVETIRACETAM 1000 MG: 100 INJECTION, SOLUTION INTRAVENOUS at 09:06

## 2023-06-24 RX ADMIN — ACETYLCYSTEINE 4 ML: 100 INHALANT RESPIRATORY (INHALATION) at 03:06

## 2023-06-24 RX ADMIN — IPRATROPIUM BROMIDE AND ALBUTEROL SULFATE 3 ML: .5; 3 SOLUTION RESPIRATORY (INHALATION) at 12:06

## 2023-06-24 RX ADMIN — FUROSEMIDE 40 MG: 10 INJECTION, SOLUTION INTRAMUSCULAR; INTRAVENOUS at 08:06

## 2023-06-24 RX ADMIN — ACETYLCYSTEINE 4 ML: 100 INHALANT RESPIRATORY (INHALATION) at 07:06

## 2023-06-24 RX ADMIN — RIFAXIMIN 550 MG: 550 TABLET ORAL at 09:06

## 2023-06-24 RX ADMIN — MUPIROCIN: 20 OINTMENT TOPICAL at 08:06

## 2023-06-24 RX ADMIN — RIFAXIMIN 550 MG: 550 TABLET ORAL at 08:06

## 2023-06-24 RX ADMIN — OLANZAPINE 5 MG: 2.5 TABLET, FILM COATED ORAL at 08:06

## 2023-06-24 RX ADMIN — SPIRONOLACTONE 50 MG: 25 TABLET, FILM COATED ORAL at 08:06

## 2023-06-24 RX ADMIN — OXYCODONE HYDROCHLORIDE 5 MG: 5 TABLET ORAL at 06:06

## 2023-06-24 RX ADMIN — LEVETIRACETAM 1000 MG: 100 INJECTION, SOLUTION INTRAVENOUS at 08:06

## 2023-06-24 RX ADMIN — POTASSIUM CHLORIDE 40 MEQ: 7.46 INJECTION, SOLUTION INTRAVENOUS at 07:06

## 2023-06-24 RX ADMIN — FAMOTIDINE 20 MG: 20 TABLET, FILM COATED ORAL at 09:06

## 2023-06-24 RX ADMIN — ARIPIPRAZOLE 5 MG: 5 TABLET ORAL at 08:06

## 2023-06-24 RX ADMIN — IPRATROPIUM BROMIDE AND ALBUTEROL SULFATE 3 ML: .5; 3 SOLUTION RESPIRATORY (INHALATION) at 03:06

## 2023-06-24 RX ADMIN — TRAZODONE HYDROCHLORIDE 25 MG: 50 TABLET ORAL at 09:06

## 2023-06-24 RX ADMIN — ACETYLCYSTEINE 4 ML: 100 INHALANT RESPIRATORY (INHALATION) at 12:06

## 2023-06-24 RX ADMIN — SILODOSIN 4 MG: 4 CAPSULE ORAL at 08:06

## 2023-06-24 RX ADMIN — MAGNESIUM SULFATE 2 G: 2 INJECTION INTRAVENOUS at 07:06

## 2023-06-24 NOTE — ASSESSMENT & PLAN NOTE
Ammonia normalized   lfts unremarkable  Bili downtrending  Coags/inr mildly abnormal  Continued encephalopathy      Cont rifaximin  Spironolactone, monitor K level, replaced prn

## 2023-06-24 NOTE — PROGRESS NOTES
[facility]  Neurocritical Care  Progress Note    Admit Date: 5/28/2023  Service Date: 06/24/2023  Length of Stay: 27    Subjective:     Chief Complaint: Non-convulsive status epilepticus    History of Present Illness: Marely Hamilton is a 58 year old female with a medical history significant for EtOH induced hepatic cirrhosis, seizure disorder on outpatient LEV and ZNS and bipolar disorder who was admitted to [facility] [place] on 5/28 as a transfer from OSH for evaluation of persistent encephalopathy concerning for NCSE vs hepatic encephalopathy. History is obtained from chart as patient is unresponsive and unable to assist. Initially presented to [facility] on 5/18 for encephalopathy and thought to be multifactorial including UTI (Proteus mirabilis s/p CTX course), hypercalcemia 2/2 lithium toxicity (Lithium changed to Abilify per Psych), as well as hepatic encephalopathy secondary to medication non compliance. She was treated with lactulose and rifaximin with no improvement in her mental status. EEG showed generalized slowing as well as triphasic discharges in the left hemisphere. MRI Brain WO was unremarkable for acute neurologic change. Decision was made to transfer patient to [facility] [place] for higher level of care and ongoing evaluation and management. On arrival, mental status exam has waxed and waned with patient being intermittently verbal and following commands. NH4 48.    NCC is consulted on hospital day 4 for admission after EEG showed frequent left hemispheric electrographic seizures lasting on average 10-30 seconds with prolonged seizures over 1.5 hours also noted. Per chart review, patient home dose of LEV increased from 1000mg to 1500mg BID, ZNS increased to 200mg. Vimpat 150mg BID added per General Neurology (had not been given prior to consult). On initial evaluation, patient is not responsive to voice or pain. Upward gaze noted. Decision made to transfer patient to [facility] for higher level of care and  airway watch.       Hospital Course: 06/02/2023 Tachycardic and hypotensive, transferred for development of mostly right hemispheric status, LOC exam remains poor  Intubated for airway protection, EEG has changed to mostly include triphasics with no definite seizure activity per Dr Boss, propofol stopped and AEDs simplified to keppra 1000mg bid only, wean off pressor, give colloids with crystalloid resuscitation  06/03/2023: remains on persistant significant pressor support despite adequate hydration, problems with third spacing and may have pulmonary hypertension as well, consider trial of stress steroids if hgb stabilizes  06/04/2023: levo down to 0.08mcg, start and advance TFs, vaso at 0.04U, ammonia has been stable, slight rise in tbili, check direct bili, hgb and plts improved, no clear source of bleeding, no source of bleeding on CT abdomen, consider superimposed hemolysis  06/05/2023 NAEON. Neurologic exam continues to improve, following commands in all extremities. Levo 0.02, weaning as able. Vaso discontinued, MAP>65. Daily SBT, monitor and hold TF at midnight for possible extubation tomorrow. Hemolysis work up ongoing, trending CBC. Continue CTX for SBP prophylaxis.   06/06/2023 Awake and following commands. SBT with low tidal volumes. Remains intubated for airway protection at this time with possible extubation tomorrow with continued neurologic improvement. Levo discontinued, maintaining SBP<65. Concern for hemolytic anemia related to autoimmune process vs hepatic dysfunction (elevated reticulocyte count and decreased haptoglobin).  06/07/2023 Rested on AC overnight due to low TV and fatigue. SBT with pressure support 10/5 this am, weaning ventilator as tolerated. Continue tube feeds with goal to optimize nutrition. Ammonia elevated, continue lactulose to encourage bowel movement.   06/08/2023 Failed SBT due to apnea. Some breaths on SIMV. Awake and following commands. Ammonia trending down (44) with  BMs, continue lactulose. Advance nutritional support with goal to increase strength towards extubation. Plt transfusions PRN. Cryo for fibrinogen <100.  06/09/2023 Venous US with R brachial and femoral DVT. Dicussed with Hematology with recommendation to start Argatroban with plts>50. Daily SBT with apnea, maintain on SIMV with backup rate of 10. Hyperammonemia resolved.   06/10/2023 patient is more alert and awake, tolerated SBT. Hematology agreed to switch to heparin for DVT given negative for HIT  06/11/2023   On AM rounds, patient was noted to be hypoxic and tachycardic. Hypoxia resolved with ventilator changes but patient continued to appear uncomfortable. Patient lied flat with noted improvement in oxygenation. Patient became acutely hypotensive, tachycardiac and hypoxic not responsive to ventilator changes. IVF bolus + Burton bump x3 + initiation of vasopressin due to concern for hypovolemic shock. Levophed added due to persistent hypertension. L femoral arterial line and R IJ trialysis placed emergently. STAT Hemoglobin 4.4. Received protamine for heparin reversal, 3u Plts and 3 FFP and 2 pRBC. STAT CTA abdo/pelvis with L retroperitoneal hematoma with + spot sign. IR consulted and patient taken class A for diagnostic angiogram with possible emobolization. Pressors weaned after volume resuscitation. Family updated over phone.   06/12/2023 s/p IR embolization of left lumbar and internal iliac branch foci of arterial extravasation. Hemoglobin stable, continue to trend CBC q8 with transfusion of pRBC for Hg<7. Restart trickle feeds. Albumin and lasix for dieresis. Low threshold for antibiotics given risk of SBP and RP hematoma. Unlikely to tolerate AC, will discuss placement of IVC filter with IR.   06/13/2023 Attempted to wean FiO2 overnight with noted drop of pO2. FiO2 increased to 55% and patient received albumin and was diuresed. Weaning this am with ABGs. Remains on fentanyl 12.5, neurologic exam unchanged. Hg  stable. Post AM rounds, patient with acute increase in O2 needs, STAT CTA chest/abd/pelvis to assess for PE vs further hemorrhage stable and without new findings. Continue current management.   06/14/2023 NAEON. Hg stable at 7.6. Plt 44, transfuse for <50. Pending IVC filter per IR for DVT burden with contraindications to AC.  06/15/2023 s/p IVC filter placement. Daily SBT, complicated by anxiety. Remains on spontaneous mode, 10/5, 40% FiO2 with goal to wean as able.   06/16/2023 Rested on AC overnight. Plts low, received 1 pack overnight. Daily SBT with plan to extubate as able.   06/17/2023 extubated on BiPap yesterday, weaning off FiO2. Plts trended down again, received transfusion overnight, no sign of active bleeding.   06/18/2023 Hg 6.7, receiving 1u pRBC. Plan to wean Bipap to high flow NC.  06/19/2023: down to 4L with sats at 100%, still requiring occasional transfusion, otherwise awake and able to interact appropriately with examiner  06/20/2023: Increasing O2 requirements, CXR with atelectasis, continued pancytopenia.  06/21/2023: Continued hypoxia. Bilirubin trending up. Spironolactone increased, lasix 20mg x 1 ordered. D5W bolus. Ammonia level WNL. ABG and CXR ordered.   6/22/23: intubated overnight  6/23/23: Extubated. CPAP QHS. Cryo x1 given.   06/24/2023 tolerating extubation          Objective:     Vitals:  Temp: 97.5 °F (36.4 °C)  Pulse: 79  Rhythm: normal sinus rhythm  BP: (!) 111/53  MAP (mmHg): 77  Resp: 12  SpO2: (!) 93 %  Oxygen Concentration (%): 30    Temp  Min: 97 °F (36.1 °C)  Max: 98.3 °F (36.8 °C)  Pulse  Min: 79  Max: 109  BP  Min: 111/53  Max: 163/74  MAP (mmHg)  Min: 77  Max: 106  Resp  Min: 12  Max: 29  SpO2  Min: 90 %  Max: 100 %  Oxygen Concentration (%)  Min: 30  Max: 30    06/23 0701 - 06/24 0700  In: 796.5   Out: 2550 [Urine:2550]   Unmeasured Output  Urine Occurrence: 1  Stool Occurrence: 0  Emesis Occurrence: 0  Pad Count: 2        Physical Exam  Vitals and nursing note  reviewed.   Constitutional:       Appearance: She is ill-appearing.   HENT:      Head: Normocephalic.      Nose: Nose normal.      Mouth/Throat:      Mouth: Mucous membranes are moist.      Pharynx: Oropharynx is clear.   Eyes:      Pupils: Pupils are equal, round, and reactive to light.   Cardiovascular:      Rate and Rhythm: Normal rate and regular rhythm.      Pulses: Normal pulses.      Heart sounds: Normal heart sounds.   Pulmonary:      Effort: Pulmonary effort is normal.      Breath sounds: Normal breath sounds.   Abdominal:      General: Bowel sounds are normal.      Palpations: Abdomen is soft.   Musculoskeletal:         General: Swelling present.   Skin:     General: Skin is warm and dry.      Capillary Refill: Capillary refill takes 2 to 3 seconds.   Neurological:      General: No focal deficit present.      Comments: Alert, oriented x2, FC in all four extremities. CN intact       R arm swelling improved, bruised       Medications:  Continuous Scheduledacetylcysteine 100 mg/ml (10%), 4 mL, Q8H  albuterol-ipratropium, 3 mL, Q8H  [START ON 6/25/2023] ARIPiprazole, 5 mg, Daily  famotidine, 20 mg, BID  furosemide (LASIX) injection, 40 mg, Daily  levETIRAcetam (Keppra) IV (PEDS and ADULTS), 1,000 mg, Q12H  mupirocin, , Daily  OLANZapine, 5 mg, BID  oxyCODONE, 5 mg, Q6H  rifAXIMin, 550 mg, BID  [START ON 6/25/2023] silodosin, 4 mg, Daily  [START ON 6/25/2023] spironolactone, 50 mg, Daily  traZODone, 25 mg, QHS    PRNsodium chloride, , Q24H PRN  sodium chloride, , Q24H PRN  sodium chloride, , Q24H PRN  dextrose 10%, 12.5 g, PRN  dextrose 10%, 25 g, PRN  dextrose, 15 g, PRN  dextrose, 30 g, PRN  magnesium sulfate IVPB, 2 g, PRN  magnesium sulfate IVPB, 4 g, PRN  naloxone, 0.02 mg, PRN  ondansetron, 4 mg, Q8H PRN  potassium chloride, 40 mEq, PRN   And  potassium chloride, 60 mEq, PRN   And  potassium chloride, 80 mEq, PRN  racepinephrine, 0.5 mL, Q4H PRN  simethicone, 1 tablet, QID PRN  sodium chloride 0.9%, 10  mL, Q8H PRN  sodium phosphate IVPB, 15 mmol, PRN  sodium phosphate IVPB, 20.01 mmol, PRN  sodium phosphate IVPB, 30 mmol, PRN            Assessment/Plan:     Neuro  Acute encephalopathy  Multifactorial     -delirium precautions    Pulmonary  Acute respiratory failure with hypoxia  Tolerating extubation    -aggressive pulm toilet, oob  -prn nippv, cpap qhs      Hematology  Thrombocytopenia  Likely secondary to hepatic cirrhosis and development of splenomegaly  Hematology consulted, tested negative for HIT    6/11 --> acute hypotension, hypoxia --> hypovolemic shock --> Large R peritoneal hematoma --> IR for class A embo --> s/p IR embolization of left lumbar and internal iliac branch foci of arterial extravasation    Plts continue to trending down despite transfusion, however, no active bleeding. Will transfuse if plt < 25, or < 50 with active bleeding    Endocrine  Severe protein-calorie malnutrition  Decreased intake due to dysphagia  Diet texture per SLP  Nutrition following    GI  Hepatic cirrhosis  Ammonia normalized   lfts unremarkable  Bili downtrending  Coags/inr mildly abnormal  Continued encephalopathy      Cont rifaximin  Spironolactone, monitor K level, replaced prn          The patient is being Prophylaxed for:  Venous Thromboembolism with: Mechanical  Stress Ulcer with: Not Applicable   Ventilator Pneumonia with: not applicable    Activity Orders          Discontinue arterial line starting at 06/18 1927    Elevate HOB flat until bedrest complete starting at 06/11 1641    Turn patient starting at 05/28 2253    Progressive Mobility Protocol (mobilize patient to their highest level of functioning at least twice daily) starting at 05/28 2000        Full Code    Critical condition in that Patient has a condition that poses threat to life and bodily function: as above     33 minutes of Critical care time was spent personally by me on the following activities: development of treatment plan with patient or  surrogate and bedside caregivers, discussions with consultants, evaluation of patient's response to treatment, examination of patient, ordering and performing treatments and interventions, ordering and review of laboratory studies, ordering and review of radiographic studies, pulse oximetry, antibiotic titration if applicable, vasopressor titration if applicable, re-evaluation of patient's condition. This critical care time did not overlap with that of any other provider or involve time for any procedures. There is high probability for acute neurological change leading to clinical and possibly life-threatening deterioration requiring highest level of physician preparedness for urgent intervention.      Nakul Casey MD  Neurocritical Care  [facility]

## 2023-06-24 NOTE — SUBJECTIVE & OBJECTIVE
Objective:     Vitals:  Temp: 97.5 °F (36.4 °C)  Pulse: 79  Rhythm: normal sinus rhythm  BP: (!) 111/53  MAP (mmHg): 77  Resp: 12  SpO2: (!) 93 %  Oxygen Concentration (%): 30    Temp  Min: 97 °F (36.1 °C)  Max: 98.3 °F (36.8 °C)  Pulse  Min: 79  Max: 109  BP  Min: 111/53  Max: 163/74  MAP (mmHg)  Min: 77  Max: 106  Resp  Min: 12  Max: 29  SpO2  Min: 90 %  Max: 100 %  Oxygen Concentration (%)  Min: 30  Max: 30    06/23 0701 - 06/24 0700  In: 796.5   Out: 2550 [Urine:2550]   Unmeasured Output  Urine Occurrence: 1  Stool Occurrence: 0  Emesis Occurrence: 0  Pad Count: 2        Physical Exam  Vitals and nursing note reviewed.   Constitutional:       Appearance: She is ill-appearing.   HENT:      Head: Normocephalic.      Nose: Nose normal.      Mouth/Throat:      Mouth: Mucous membranes are moist.      Pharynx: Oropharynx is clear.   Eyes:      Pupils: Pupils are equal, round, and reactive to light.   Cardiovascular:      Rate and Rhythm: Normal rate and regular rhythm.      Pulses: Normal pulses.      Heart sounds: Normal heart sounds.   Pulmonary:      Effort: Pulmonary effort is normal.      Breath sounds: Normal breath sounds.   Abdominal:      General: Bowel sounds are normal.      Palpations: Abdomen is soft.   Musculoskeletal:         General: Swelling present.   Skin:     General: Skin is warm and dry.      Capillary Refill: Capillary refill takes 2 to 3 seconds.   Neurological:      General: No focal deficit present.      Comments: Alert, oriented x2, FC in all four extremities. CN intact       R arm swelling improved, bruised       Medications:  Continuous Scheduledacetylcysteine 100 mg/ml (10%), 4 mL, Q8H  albuterol-ipratropium, 3 mL, Q8H  [START ON 6/25/2023] ARIPiprazole, 5 mg, Daily  famotidine, 20 mg, BID  furosemide (LASIX) injection, 40 mg, Daily  levETIRAcetam (Keppra) IV (PEDS and ADULTS), 1,000 mg, Q12H  mupirocin, , Daily  OLANZapine, 5 mg, BID  oxyCODONE, 5 mg, Q6H  rifAXIMin, 550 mg,  BID  [START ON 6/25/2023] silodosin, 4 mg, Daily  [START ON 6/25/2023] spironolactone, 50 mg, Daily  traZODone, 25 mg, QHS    PRNsodium chloride, , Q24H PRN  sodium chloride, , Q24H PRN  sodium chloride, , Q24H PRN  dextrose 10%, 12.5 g, PRN  dextrose 10%, 25 g, PRN  dextrose, 15 g, PRN  dextrose, 30 g, PRN  magnesium sulfate IVPB, 2 g, PRN  magnesium sulfate IVPB, 4 g, PRN  naloxone, 0.02 mg, PRN  ondansetron, 4 mg, Q8H PRN  potassium chloride, 40 mEq, PRN   And  potassium chloride, 60 mEq, PRN   And  potassium chloride, 80 mEq, PRN  racepinephrine, 0.5 mL, Q4H PRN  simethicone, 1 tablet, QID PRN  sodium chloride 0.9%, 10 mL, Q8H PRN  sodium phosphate IVPB, 15 mmol, PRN  sodium phosphate IVPB, 20.01 mmol, PRN  sodium phosphate IVPB, 30 mmol, PRN

## 2023-06-24 NOTE — ASSESSMENT & PLAN NOTE
Continue Rifaximin   Ammonia normalized   Follow coags and fibrinogen    Consider re-consult hepatology as stabilizes   Spironolactone, monitor K level, replaced prn  Bilirubin now downtrending, continue to monitor with daily CMPs

## 2023-06-24 NOTE — NURSING
Patient remains confused to place and situation throughout shift, with visual hallucinations.  Able to be redirected.  Tolerated CPAP throughout night without issue.  Patient's only complaint was that she is hungry.  Failed bedside swallow.  Able to tolerate meds in apple sauce, as per speech prior to intubation.  Unable to tolerate ice chips without coughing.  Will continue to monitor.  See chart for v/s, labs, I/o.

## 2023-06-24 NOTE — ASSESSMENT & PLAN NOTE
Pancytopenia, low fibrinogen--> DIC post acute blood loss anemia  Transfuse for plt count < 25k  - Cryo x1 given today for fibrinogen 87 with significant oozing/bleeding from wounds and IV sites

## 2023-06-24 NOTE — ASSESSMENT & PLAN NOTE
Intubated 6/22 after continued increasing O2 requirements and acute seizure/mental status change   - Extubated to CPAP 6/23 without incident  - Continue diuresis (furosemide + spironolactone)   - CPAP QHS and PRN  - Daily CXR  - IS  *Difficult intubation due to far anterior airway*

## 2023-06-24 NOTE — ASSESSMENT & PLAN NOTE
Extubated 6/16, reintubated 6/22, extubated 6/23  - CPAP QHS and PRN   - Pulmonary toileting  - Sit up on side of bed or cardiac chair with assistance  - Incentive spirometry

## 2023-06-24 NOTE — ASSESSMENT & PLAN NOTE
58F with EtOH induced hepatic cirrhosis, seizure disorder on outpatient LEV and ZNS and bipolar disorder admitted on 5/28 for persistent encephalopathy.    - UTI on admit (Proteus mirabilis), now s/p CTX course  - Hypercalcemia 2/2 lithium toxicity (Lithium changed to Abilify per Psych)  - Hepatic encephalopathy 2/2 to medication non compliance, treated with lactulose and rifaximin     MRI Brain WO was unremarkable for acute neurologic change  EEG w/ frequent left hemispheric electrographic seizures and NCSE  Repeat EEGs without seizure activity x 24 hours, now resolved    6/11 --> acute hypotension, hypoxia --> hypovolemic shock --> Large R peritoneal hematoma --> IR for class A embo --> s/p IR embolization of left lumbar and internal iliac branch foci of arterial extravasation  6/13 --> episode of hypoxia and SOB --> CTA PE without evidence of PE, CT C/A/P without new bleed    - Continued admission to [facility]  - Q4h neurochecks while in ICU  - Q1h vitals while in ICU  - HOB@30  - Keppra 500mg BID  - Thiamine replacement   - Lactulose, titrate to BM  - MAP>65  - Daily CMP, Mag, Phos - replete electrolytes PRN  - Lipid panel, A1c, Coags reviewed  - Daily CBC, transfuse PRN and replace Plts <25  - Daily fibrinogen, PT/INR  - Heparin held in setting of acute bleed/DIC, IVC filter in place  - Keppra 1g BID, cEEG without captured seizure 6/22   - PT/OT/SLP as appropriate  - CM/SW consult for dispo planning

## 2023-06-24 NOTE — PLAN OF CARE
[facility] Care Plan    POC reviewed with Mraely Hamilton and family at 1400. Pt unable to verbalize understanding. Questions and concerns addressed. No acute events today. Will continue to monitor. See below and flowsheets for full assessment and VS info.   - Mg an K replaced  - CPAP on  - EEG cap in place            Is this a stroke patient? no    Neuro:  [place] Coma Scale  Best Eye Response: 4-->(E4) spontaneous  Best Motor Response: 6-->(M6) obeys commands  Best Verbal Response: 4-->(V4) confused  Cheryl Coma Scale Score: 14  Assessment Qualifiers: eye obstruction present  Pupil PERRLA: no     24 hr Temp:  [97 °F (36.1 °C)-97.8 °F (36.6 °C)]     CV:   Rhythm: normal sinus rhythm  BP goals:   SBP < 180  MAP > 65    Resp:      Vent Mode: Spont  Set Rate: 16 BPM  Oxygen Concentration (%): 40  Vt Set: 440 mL  PEEP/CPAP: 5 cmH20  Pressure Support: 5 cmH20    Plan: N/A    GI/:     Diet/Nutrition Received: NPO  Last Bowel Movement: 06/21/23  Voiding Characteristics: external catheter    Intake/Output Summary (Last 24 hours) at 6/24/2023 1817  Last data filed at 6/24/2023 1200  Gross per 24 hour   Intake 447.64 ml   Output 3250 ml   Net -2802.36 ml     Unmeasured Output  Urine Occurrence: 1  Stool Occurrence: 0  Emesis Occurrence: 0  Pad Count: 2    Labs/Accuchecks:  Recent Labs   Lab 06/23/23  1043   WBC 2.78*   RBC 2.42*   HGB 7.2*   HCT 23.3*   PLT 41*      Recent Labs   Lab 06/24/23  0319 06/24/23  1417   *  --    K 3.6 4.2   CO2 20*  --    *  --    BUN 10  --    CREATININE 0.4*  --    ALKPHOS 102  --    ALT 24  --    AST 50*  --    BILITOT 7.5*  --       Recent Labs   Lab 06/21/23  0912 06/22/23  0112 06/24/23 0319   INR  --    < > 2.5*   APTT 33.8*  --   --     < > = values in this interval not displayed.    No results for input(s): CPK, CPKMB, TROPONINI, MB in the last 168 hours.    Electrolytes: Electrolytes replaced  Accuchecks: none    Gtts:      LDA/Wounds:  Lines/Drains/Airways       Drain   Duration             Female External Urinary Catheter 06/22/23 0801 2 days              Peripheral Intravenous Line  Duration                  Peripheral IV - Single Lumen 06/15/23 1229 20 G;1 3/4 in Right Lower leg 9 days         Peripheral IV - Single Lumen 06/21/23 0012 20 G Anterior;Left Upper Arm 3 days         Peripheral IV - Single Lumen 06/21/23 0013 22 G Left;Posterior Wrist 3 days                  Wounds: Yes  Wound care consulted: Yes

## 2023-06-24 NOTE — PROGRESS NOTES
[facility] Neuro Critical Care  Neurocritical Care  Progress Note    Admit Date: 5/28/2023  Service Date: 06/23/2023  Length of Stay: 26    Subjective:     Chief Complaint: Non-convulsive status epilepticus    History of Present Illness: Marely Hamilton is a 58 year old female with a medical history significant for EtOH induced hepatic cirrhosis, seizure disorder on outpatient LEV and ZNS and bipolar disorder who was [place] to [facility] [place] as a transfer from OSH for evaluation of persistent encephalopathy concerning for NCSE vs hepatic encephalopathy. History is obtained from chart as patient is unresponsive and unable to assist. Initially presented to [facility] on 5/18 for encephalopathy and thought to be multifactorial including UTI (Proteus mirabilis s/p CTX course), hypercalcemia 2/2 lithium toxicity (Lithium changed to Abilify per Psych), as well as hepatic encephalopathy secondary to medication non compliance. She was treated with lactulose and rifaximin with no improvement in her mental status. EEG showed generalized slowing as well as triphasic discharges in the left hemisphere. MRI Brain WO was unremarkable for acute neurologic change. Decision was made to transfer [place] to [facility] [place] higher level of care and ongoing evaluation and management. On arrival, mental status exam has waxed and waned with patient being intermittently verbal and following commands. NH4 48.    NCC is consulted on hospital day 4 for admission after EEG showed frequent left hemispheric electrographic seizures lasting on average 10-30 seconds with prolonged seizures over 1.5 hours also noted. Per chart review, patient home dose of LEV increased from 1000mg to 1500mg BID, ZNS increased to 200mg. Vimpat 150mg BID added per General Neurology (had not been given prior to consult). On initial evaluation, patient is not responsive to voice or pain. Upward gaze noted. Decision made to transfer patient to [facility] for higher level of care and  airway watch.       Hospital Course: 06/02/2023 Tachycardic and hypotensive, transferred for development of mostly right hemispheric status, LOC exam remains poor  Intubated for airway protection, EEG has changed to mostly include triphasics with no definite seizure activity per Dr Boss, propofol stopped and AEDs simplified to keppra 1000mg bid only, wean off pressor, give colloids with crystalloid resuscitation  06/03/2023: remains on persistant significant pressor support despite adequate hydration, problems with third spacing and may have pulmonary hypertension as well, consider trial of stress steroids if hgb stabilizes  06/04/2023: levo down to 0.08mcg, start and advance TFs, vaso at 0.04U, ammonia has been stable, slight rise in tbili, check direct bili, hgb and plts improved, no clear source of bleeding, no source of bleeding on CT abdomen, consider superimposed hemolysis  06/05/2023 NAEON. Neurologic exam continues to improve, following commands in all extremities. Levo 0.02, weaning as able. Vaso discontinued, MAP>65. Daily SBT, monitor and hold TF at midnight for possible extubation tomorrow. Hemolysis work up ongoing, trending CBC. Continue CTX for SBP prophylaxis.   06/06/2023 Awake and following commands. SBT with low tidal volumes. Remains intubated for airway protection at this time with possible extubation tomorrow with continued neurologic improvement. Levo discontinued, maintaining SBP<65. Concern for hemolytic anemia related to autoimmune process vs hepatic dysfunction (elevated reticulocyte count and decreased haptoglobin).  06/07/2023 Rested on AC overnight due to low TV and fatigue. SBT with pressure support 10/5 this am, weaning ventilator as tolerated. Continue tube feeds with goal to optimize nutrition. Ammonia elevated, continue lactulose to encourage bowel movement.   06/08/2023 Failed SBT due to apnea. Some breaths on SIMV. Awake and following commands. Ammonia trending down (44) with  BMs, continue lactulose. Advance nutritional support with goal to increase strength towards extubation. Plt transfusions PRN. Cryo for fibrinogen <100.  06/09/2023 Venous US with R brachial and femoral DVT. Dicussed with Hematology with recommendation to start Argatroban with plts>50. Daily SBT with apnea, maintain on SIMV with backup rate of 10. Hyperammonemia resolved.   06/10/2023 patient is more alert and awake, tolerated SBT. Hematology agreed to switch to heparin for DVT given negative for HIT  06/11/2023   On AM rounds, patient was noted to be hypoxic and tachycardic. Hypoxia resolved with ventilator changes but patient continued to appear uncomfortable. Patient lied flat with noted improvement in oxygenation. Patient became acutely hypotensive, tachycardiac and hypoxic not responsive to ventilator changes. IVF bolus + Burton bump x3 + initiation of vasopressin due to concern for hypovolemic shock. Levophed added due to persistent hypertension. L femoral arterial line and R IJ trialysis placed emergently. STAT Hemoglobin 4.4. Received protamine for heparin reversal, 3u Plts and 3 FFP and 2 pRBC. STAT CTA abdo/pelvis with L retroperitoneal hematoma with + spot sign. IR consulted and patient taken class A for diagnostic angiogram with possible emobolization. Pressors weaned after volume resuscitation. Family updated over phone.   06/12/2023 s/p IR embolization of left lumbar and internal iliac branch foci of arterial extravasation. Hemoglobin stable, continue to trend CBC q8 with transfusion of pRBC for Hg<7. Restart trickle feeds. Albumin and lasix for dieresis. Low threshold for antibiotics given risk of SBP and RP hematoma. Unlikely to tolerate AC, will discuss placement of IVC filter with IR.   06/13/2023 Attempted to wean FiO2 overnight with noted drop of pO2. FiO2 increased to 55% and patient received albumin and was diuresed. Weaning this am with ABGs. Remains on fentanyl 12.5, neurologic exam unchanged. Hg  stable. Post AM rounds, patient with acute increase in O2 needs, STAT CTA chest/abd/pelvis to assess for PE vs further hemorrhage stable and without new findings. Continue current management.   06/14/2023 NAEON. Hg stable at 7.6. Plt 44, transfuse for <50. Pending IVC filter per IR for DVT burden with contraindications to AC.  06/15/2023 s/p IVC filter placement. Daily SBT, complicated by anxiety. Remains on spontaneous mode, 10/5, 40% FiO2 with goal to wean as able.   06/16/2023 Rested on AC overnight. Plts low, received 1 pack overnight. Daily SBT with plan to extubate as able.   06/17/2023 extubated on BiPap yesterday, weaning off FiO2. Plts trended down again, received transfusion overnight, no sign of active bleeding.   06/18/2023 Hg 6.7, receiving 1u pRBC. Plan to wean Bipap to high flow NC.  06/19/2023: down to 4L with sats at 100%, still requiring occasional transfusion, otherwise awake and able to interact appropriately with examiner  06/20/2023: Increasing O2 requirements, CXR with atelectasis, continued pancytopenia.  06/21/2023: Continued hypoxia. Bilirubin trending up. Spironolactone increased, lasix 20mg x 1 ordered. D5W bolus. Ammonia level WNL. ABG and CXR ordered.   6/22/23: intubated overnight  6/23/23: Extubated. CPAP QHS. Cryo x1 given.       Interval History:  Extubated this AM to CPAP. Doing well. Cryo x1 given for very low fibrinogen and active oozing/bleeding from multiple IV sites. EEG remains negative for seizures.     Review of Systems   Reason unable to perform ROS: intubated/CPAP.     Objective:     Vitals:  Temp: 97.9 °F (36.6 °C)  Pulse: 97  BP: 132/68  MAP (mmHg): 95  Resp: 17  SpO2: (!) 93 %  Oxygen Concentration (%): 40  Vent Mode: Spont  Set Rate: 16 BPM  Vt Set: 440 mL  Pressure Support: 5 cmH20  PEEP/CPAP: 5 cmH20  Peak Airway Pressure: 10 cmH20  Mean Airway Pressure: 7.9 cmH20  Plateau Pressure: 25 cmH20    Temp  Min: 97.2 °F (36.2 °C)  Max: 100.2 °F (37.9 °C)  Pulse  Min: 72   Max: 108  BP  Min: 98/50  Max: 189/72  MAP (mmHg)  Min: 71  Max: 114  Resp  Min: 13  Max: 36  SpO2  Min: 93 %  Max: 100 %  Oxygen Concentration (%)  Min: 40  Max: 50    06/22 0701 - 06/23 0700  In: 1756.3 [I.V.:23]  Out: 1400 [Urine:1400]   Unmeasured Output  Urine Occurrence: 1  Stool Occurrence: 0  Emesis Occurrence: 0  Pad Count: 1        Physical Exam  Vitals and nursing note reviewed.   Constitutional:       Appearance: She is ill-appearing.   HENT:      Head: Normocephalic.      Nose: Nose normal.      Mouth/Throat:      Mouth: Mucous membranes are moist.      Pharynx: Oropharynx is clear.   Eyes:      Pupils: Pupils are equal, round, and reactive to light.   Cardiovascular:      Rate and Rhythm: Normal rate and regular rhythm.      Pulses: Normal pulses.      Heart sounds: Normal heart sounds.   Pulmonary:      Effort: Pulmonary effort is normal.      Breath sounds: Normal breath sounds.   Abdominal:      General: Bowel sounds are normal.      Palpations: Abdomen is soft.   Musculoskeletal:         General: Swelling present.   Skin:     General: Skin is warm and dry.      Capillary Refill: Capillary refill takes 2 to 3 seconds.   Neurological:      General: No focal deficit present.      Comments: Drowsy, oriented x1, FC in all four extremities. CN intact      R arm swollen, bruised, dependent edema of left hand.       Unable to test orientation, language, memory, judgment, insight, fund of knowledge, hearing, shoulder shrug, tongue protrusion, coordination, gait due to level of consciousness.       Medications:  Continuous Scheduledacetylcysteine 100 mg/ml (10%), 4 mL, Q8H  albuterol-ipratropium, 3 mL, Q8H  ARIPiprazole, 5 mg, Daily  famotidine, 20 mg, BID  furosemide (LASIX) injection, 40 mg, Daily  levETIRAcetam (Keppra) IV (PEDS and ADULTS), 1,000 mg, Q12H  mupirocin, , Daily  OLANZapine, 5 mg, BID  oxyCODONE, 5 mg, Q6H  rifAXIMin, 550 mg, BID  silodosin, 4 mg, Daily  spironolactone, 50 mg,  Daily  traZODone, 25 mg, QHS    PRNsodium chloride, , Q24H PRN  sodium chloride, , Q24H PRN  sodium chloride, , Q24H PRN  dextrose 10%, 12.5 g, PRN  dextrose 10%, 25 g, PRN  dextrose, 15 g, PRN  dextrose, 30 g, PRN  fentaNYL, 50 mcg, Q1H PRN  magnesium sulfate IVPB, 2 g, PRN  magnesium sulfate IVPB, 4 g, PRN  naloxone, 0.02 mg, PRN  ondansetron, 4 mg, Q8H PRN  potassium chloride, 40 mEq, PRN   And  potassium chloride, 60 mEq, PRN   And  potassium chloride, 80 mEq, PRN  racepinephrine, 0.5 mL, Q4H PRN  simethicone, 1 tablet, QID PRN  sodium chloride 0.9%, 10 mL, Q8H PRN  sodium phosphate IVPB, 15 mmol, PRN  sodium phosphate IVPB, 20.01 mmol, PRN  sodium phosphate IVPB, 30 mmol, PRN      Today I personally reviewed pertinent medications, lines/drains/airways, imaging, cardiology results, laboratory results, microbiology results,     Diet  No diet orders on file  No diet orders on file          Assessment/Plan:     Neuro  * Non-convulsive status epilepticus  58F with EtOH induced hepatic cirrhosis, seizure disorder on outpatient LEV and ZNS and bipolar disorder admitted on 5/28 for persistent encephalopathy.    - UTI on admit (Proteus mirabilis), now s/p CTX course  - Hypercalcemia 2/2 lithium toxicity (Lithium changed to Abilify per Psych)  - Hepatic encephalopathy 2/2 to medication non compliance, treated with lactulose and rifaximin     MRI Brain WO was unremarkable for acute neurologic change  EEG w/ frequent left hemispheric electrographic seizures and NCSE  Repeat EEGs without seizure activity x 24 hours, now resolved    6/11 --> acute hypotension, hypoxia --> hypovolemic shock --> Large R peritoneal hematoma --> IR for class A embo --> s/p IR embolization of left lumbar and internal iliac branch foci of arterial extravasation  6/13 --> episode of hypoxia and SOB --> CTA PE without evidence of PE, CT C/A/P without new bleed    - Continued admission to [facility]  - Q4h neurochecks while in ICU  - Q1h vitals while in  ICU  - HOB@30  - Keppra 500mg BID  - Thiamine replacement   - Lactulose, titrate to BM  - MAP>65  - Daily CMP, Mag, Phos - replete electrolytes PRN  - Lipid panel, A1c, Coags reviewed  - Daily CBC, transfuse PRN and replace Plts <25  - Daily fibrinogen, PT/INR  - Heparin held in setting of acute bleed/DIC, IVC filter in place  - Keppra 1g BID, cEEG without captured seizure 6/22   - PT/OT/SLP as appropriate  - CM/SW consult for dispo planning    Psychiatric  Bipolar 1 disorder  Continue abilify, zyprexa    Alcohol abuse, in remission  See Non-convulsive status epilepticus    ENT  Abrasion of nose  Wound care service following     Pulmonary  Atelectasis  Extubated 6/16, reintubated 6/22, extubated 6/23  - CPAP QHS and PRN   - Pulmonary toileting  - Sit up on side of bed or cardiac chair with assistance  - Incentive spirometry        Acute respiratory failure with hypoxia  Intubated 6/22 after continued increasing O2 requirements and acute seizure/mental status change   - Extubated to CPAP 6/23 without incident  - Continue diuresis (furosemide + spironolactone)   - CPAP QHS and PRN  - Daily CXR  - IS  *Difficult intubation due to far anterior airway*      Hematology  Internal jugular (IJ) vein thromboembolism, acute, right  - See DVT     Coagulopathy  Pancytopenia, low fibrinogen--> DIC post acute blood loss anemia  Transfuse for plt count < 25k  - Cryo x1 given today for fibrinogen 87 with significant oozing/bleeding from wounds and IV sites     Acute deep vein thrombosis (DVT) of brachial vein of right upper extremity  Nonocclusive R brachial vein DVT in area of previous IV site, noted on 6/8  Also R IJ DVT noted on 6/19  Argatroban per Heme recs - transitioned to heparin 6/10  HELD 6/11 in setting of retroperitoneal bleed  IVC filter placed 6/14  Unable to anticoagulate due to continued pancytopenia/thrombocytopenia    Deep vein thrombosis (DVT) of femoral vein of right lower extremity  Nonocclusive right proximal  femoral vein DVT  Noted on 6/8  Argatroban per Heme recs - transitioned to heparin  HELD 6/11 in setting of hypovolemic shock 2/2 retroperitoneal hemorrhage   IVC filter placed 6/14  Unable to anticoagulate due to continued pancytopenia/thrombocytopenia    Thrombocytopenia  Likely secondary to hepatic cirrhosis and development of splenomegaly  Hematology consulted, tested negative for HIT    6/11 --> acute hypotension, hypoxia --> hypovolemic shock --> Large R peritoneal hematoma --> IR for class A embo --> s/p IR embolization of left lumbar and internal iliac branch foci of arterial extravasation    Plts continue to trending down despite transfusion, however, no active bleeding. Will transfuse if plt < 25, or < 50 with active bleeding    Endocrine  Severe protein-calorie malnutrition  Decreased intake due to dysphagia  Diet texture per SLP  Nutrition following    GI  Hepatic cirrhosis  Continue Rifaximin   Ammonia normalized   Follow coags and fibrinogen    Consider re-consult hepatology as stabilizes   Spironolactone, monitor K level, replaced prn  Bilirubin now downtrending, continue to monitor with daily CMPs     Hepatic encephalopathy  Improving    Other  Retroperitoneal bleed  6/11 --> acute hypotension, hypoxia --> hypovolemic shock --> Large R peritoneal hematoma --> IR for class A embo --> s/p IR embolization of left lumbar and internal iliac branch foci of arterial extravasation    -Repeat CT c/a/p stable        The patient is being Prophylaxed for:  Venous Thromboembolism with: Mechanical  Stress Ulcer with: H2B  Ventilator Pneumonia with: not applicable    Activity Orders          Discontinue arterial line starting at 06/18 1927    Elevate HOB flat until bedrest complete starting at 06/11 1641    Turn patient starting at 05/28 5535    Progressive Mobility Protocol (mobilize patient to their highest level of functioning at least twice daily) starting at 05/28 2000        Full Code    Critical care time:  45 minutes     Catalina Rajput PA-C  Neurocritical Care  [facility] - Neuro Critical Care

## 2023-06-25 PROBLEM — J96.01 ACUTE RESPIRATORY FAILURE WITH HYPOXIA: Status: RESOLVED | Noted: 2023-05-28 | Resolved: 2023-06-25

## 2023-06-25 PROBLEM — R06.89 ACUTE RESPIRATORY INSUFFICIENCY: Status: ACTIVE | Noted: 2023-06-25

## 2023-06-25 LAB
ALBUMIN SERPL BCP-MCNC: 2.4 G/DL (ref 3.5–5.2)
ALP SERPL-CCNC: 100 U/L (ref 55–135)
ALT SERPL W/O P-5'-P-CCNC: 23 U/L (ref 10–44)
ANION GAP SERPL CALC-SCNC: 9 MMOL/L (ref 8–16)
AST SERPL-CCNC: 38 U/L (ref 10–40)
BASOPHILS # BLD AUTO: 0.01 K/UL (ref 0–0.2)
BASOPHILS NFR BLD: 0.3 % (ref 0–1.9)
BILIRUB SERPL-MCNC: 7 MG/DL (ref 0.1–1)
BUN SERPL-MCNC: 11 MG/DL (ref 6–20)
CALCIUM SERPL-MCNC: 8.2 MG/DL (ref 8.7–10.5)
CHLORIDE SERPL-SCNC: 119 MMOL/L (ref 95–110)
CO2 SERPL-SCNC: 20 MMOL/L (ref 23–29)
CREAT SERPL-MCNC: 0.4 MG/DL (ref 0.5–1.4)
DIFFERENTIAL METHOD: ABNORMAL
EOSINOPHIL # BLD AUTO: 0.1 K/UL (ref 0–0.5)
EOSINOPHIL NFR BLD: 1.6 % (ref 0–8)
ERYTHROCYTE [DISTWIDTH] IN BLOOD BY AUTOMATED COUNT: 26.5 % (ref 11.5–14.5)
EST. GFR  (NO RACE VARIABLE): >60 ML/MIN/1.73 M^2
FIBRINOGEN PPP-MCNC: 104 MG/DL (ref 182–400)
GLUCOSE SERPL-MCNC: 71 MG/DL (ref 70–110)
HCT VFR BLD AUTO: 24.9 % (ref 37–48.5)
HGB BLD-MCNC: 7.6 G/DL (ref 12–16)
IMM GRANULOCYTES # BLD AUTO: 0.01 K/UL (ref 0–0.04)
IMM GRANULOCYTES NFR BLD AUTO: 0.3 % (ref 0–0.5)
INR PPP: 1.8 (ref 0.8–1.2)
LYMPHOCYTES # BLD AUTO: 0.7 K/UL (ref 1–4.8)
LYMPHOCYTES NFR BLD: 18.6 % (ref 18–48)
MAGNESIUM SERPL-MCNC: 1.8 MG/DL (ref 1.6–2.6)
MAGNESIUM SERPL-MCNC: 2.5 MG/DL (ref 1.6–2.6)
MCH RBC QN AUTO: 29.8 PG (ref 27–31)
MCHC RBC AUTO-ENTMCNC: 30.5 G/DL (ref 32–36)
MCV RBC AUTO: 98 FL (ref 82–98)
MONOCYTES # BLD AUTO: 0.3 K/UL (ref 0.3–1)
MONOCYTES NFR BLD: 8.7 % (ref 4–15)
NEUTROPHILS # BLD AUTO: 2.6 K/UL (ref 1.8–7.7)
NEUTROPHILS NFR BLD: 70.5 % (ref 38–73)
NRBC BLD-RTO: 0 /100 WBC
PHOSPHATE SERPL-MCNC: 2.5 MG/DL (ref 2.7–4.5)
PHOSPHATE SERPL-MCNC: 3.1 MG/DL (ref 2.7–4.5)
PLATELET # BLD AUTO: 47 K/UL (ref 150–450)
PMV BLD AUTO: 10.4 FL (ref 9.2–12.9)
POTASSIUM SERPL-SCNC: 4 MMOL/L (ref 3.5–5.1)
PROT SERPL-MCNC: 4.9 G/DL (ref 6–8.4)
PROTHROMBIN TIME: 18.2 SEC (ref 9–12.5)
RBC # BLD AUTO: 2.55 M/UL (ref 4–5.4)
SODIUM SERPL-SCNC: 148 MMOL/L (ref 136–145)
WBC # BLD AUTO: 3.66 K/UL (ref 3.9–12.7)

## 2023-06-25 PROCEDURE — 94761 N-INVAS EAR/PLS OXIMETRY MLT: CPT

## 2023-06-25 PROCEDURE — 25000242 PHARM REV CODE 250 ALT 637 W/ HCPCS: Performed by: PSYCHIATRY & NEUROLOGY

## 2023-06-25 PROCEDURE — 97164 PT RE-EVAL EST PLAN CARE: CPT

## 2023-06-25 PROCEDURE — 25000003 PHARM REV CODE 250

## 2023-06-25 PROCEDURE — 25000003 PHARM REV CODE 250: Performed by: NURSE PRACTITIONER

## 2023-06-25 PROCEDURE — 84100 ASSAY OF PHOSPHORUS: CPT | Mod: 91

## 2023-06-25 PROCEDURE — 99291 CRITICAL CARE FIRST HOUR: CPT | Mod: ,,, | Performed by: PSYCHIATRY & NEUROLOGY

## 2023-06-25 PROCEDURE — 94660 CPAP INITIATION&MGMT: CPT

## 2023-06-25 PROCEDURE — 20000000 HC ICU ROOM

## 2023-06-25 PROCEDURE — 99900035 HC TECH TIME PER 15 MIN (STAT)

## 2023-06-25 PROCEDURE — 84100 ASSAY OF PHOSPHORUS: CPT

## 2023-06-25 PROCEDURE — 92610 EVALUATE SWALLOWING FUNCTION: CPT

## 2023-06-25 PROCEDURE — 27000221 HC OXYGEN, UP TO 24 HOURS

## 2023-06-25 PROCEDURE — 97530 THERAPEUTIC ACTIVITIES: CPT

## 2023-06-25 PROCEDURE — 94668 MNPJ CHEST WALL SBSQ: CPT

## 2023-06-25 PROCEDURE — 83735 ASSAY OF MAGNESIUM: CPT

## 2023-06-25 PROCEDURE — 83735 ASSAY OF MAGNESIUM: CPT | Mod: 91

## 2023-06-25 PROCEDURE — 80053 COMPREHEN METABOLIC PANEL: CPT

## 2023-06-25 PROCEDURE — 63600175 PHARM REV CODE 636 W HCPCS

## 2023-06-25 PROCEDURE — 63600175 PHARM REV CODE 636 W HCPCS: Performed by: PHYSICIAN ASSISTANT

## 2023-06-25 PROCEDURE — 25000003 PHARM REV CODE 250: Performed by: PSYCHIATRY & NEUROLOGY

## 2023-06-25 PROCEDURE — 85610 PROTHROMBIN TIME: CPT | Performed by: NURSE PRACTITIONER

## 2023-06-25 PROCEDURE — 99291 PR CRITICAL CARE, E/M 30-74 MINUTES: ICD-10-PCS | Mod: ,,, | Performed by: PSYCHIATRY & NEUROLOGY

## 2023-06-25 PROCEDURE — 85025 COMPLETE CBC W/AUTO DIFF WBC: CPT

## 2023-06-25 PROCEDURE — 85384 FIBRINOGEN ACTIVITY: CPT | Performed by: NURSE PRACTITIONER

## 2023-06-25 PROCEDURE — 94640 AIRWAY INHALATION TREATMENT: CPT

## 2023-06-25 PROCEDURE — P9047 ALBUMIN (HUMAN), 25%, 50ML: HCPCS | Mod: JZ,JG

## 2023-06-25 RX ORDER — ALBUMIN HUMAN 250 G/1000ML
25 SOLUTION INTRAVENOUS ONCE
Status: COMPLETED | OUTPATIENT
Start: 2023-06-25 | End: 2023-06-25

## 2023-06-25 RX ORDER — LEVETIRACETAM 100 MG/ML
1000 SOLUTION ORAL EVERY 12 HOURS
Status: DISCONTINUED | OUTPATIENT
Start: 2023-06-25 | End: 2023-06-30 | Stop reason: HOSPADM

## 2023-06-25 RX ORDER — FUROSEMIDE 10 MG/ML
20 INJECTION INTRAMUSCULAR; INTRAVENOUS ONCE
Status: COMPLETED | OUTPATIENT
Start: 2023-06-25 | End: 2023-06-25

## 2023-06-25 RX ORDER — OXYCODONE HYDROCHLORIDE 5 MG/1
5 TABLET ORAL EVERY 6 HOURS PRN
Status: DISCONTINUED | OUTPATIENT
Start: 2023-06-25 | End: 2023-06-30 | Stop reason: HOSPADM

## 2023-06-25 RX ADMIN — ACETYLCYSTEINE 4 ML: 100 INHALANT RESPIRATORY (INHALATION) at 03:06

## 2023-06-25 RX ADMIN — FAMOTIDINE 20 MG: 20 TABLET, FILM COATED ORAL at 08:06

## 2023-06-25 RX ADMIN — OLANZAPINE 5 MG: 2.5 TABLET, FILM COATED ORAL at 08:06

## 2023-06-25 RX ADMIN — LEVETIRACETAM 1000 MG: 500 SOLUTION ORAL at 08:06

## 2023-06-25 RX ADMIN — ARIPIPRAZOLE 5 MG: 5 TABLET ORAL at 08:06

## 2023-06-25 RX ADMIN — RIFAXIMIN 550 MG: 550 TABLET ORAL at 08:06

## 2023-06-25 RX ADMIN — MUPIROCIN: 20 OINTMENT TOPICAL at 08:06

## 2023-06-25 RX ADMIN — FUROSEMIDE 20 MG: 10 INJECTION, SOLUTION INTRAMUSCULAR; INTRAVENOUS at 07:06

## 2023-06-25 RX ADMIN — ALBUMIN (HUMAN) 25 G: 12.5 SOLUTION INTRAVENOUS at 06:06

## 2023-06-25 RX ADMIN — FUROSEMIDE 40 MG: 10 INJECTION, SOLUTION INTRAMUSCULAR; INTRAVENOUS at 11:06

## 2023-06-25 RX ADMIN — TRAZODONE HYDROCHLORIDE 25 MG: 50 TABLET ORAL at 08:06

## 2023-06-25 RX ADMIN — MAGNESIUM SULFATE 2 G: 2 INJECTION INTRAVENOUS at 02:06

## 2023-06-25 RX ADMIN — IPRATROPIUM BROMIDE AND ALBUTEROL SULFATE 3 ML: .5; 3 SOLUTION RESPIRATORY (INHALATION) at 12:06

## 2023-06-25 RX ADMIN — IPRATROPIUM BROMIDE AND ALBUTEROL SULFATE 3 ML: .5; 3 SOLUTION RESPIRATORY (INHALATION) at 07:06

## 2023-06-25 RX ADMIN — OXYCODONE HYDROCHLORIDE 5 MG: 5 TABLET ORAL at 08:06

## 2023-06-25 RX ADMIN — SILODOSIN 4 MG: 4 CAPSULE ORAL at 08:06

## 2023-06-25 RX ADMIN — ACETYLCYSTEINE 4 ML: 100 INHALANT RESPIRATORY (INHALATION) at 07:06

## 2023-06-25 RX ADMIN — LEVETIRACETAM 1000 MG: 100 INJECTION, SOLUTION INTRAVENOUS at 08:06

## 2023-06-25 RX ADMIN — SODIUM PHOSPHATE, MONOBASIC, MONOHYDRATE AND SODIUM PHOSPHATE, DIBASIC, ANHYDROUS 15 MMOL: 142; 276 INJECTION, SOLUTION INTRAVENOUS at 03:06

## 2023-06-25 RX ADMIN — SPIRONOLACTONE 50 MG: 25 TABLET, FILM COATED ORAL at 08:06

## 2023-06-25 RX ADMIN — IPRATROPIUM BROMIDE AND ALBUTEROL SULFATE 3 ML: .5; 3 SOLUTION RESPIRATORY (INHALATION) at 11:06

## 2023-06-25 RX ADMIN — IPRATROPIUM BROMIDE AND ALBUTEROL SULFATE 3 ML: .5; 3 SOLUTION RESPIRATORY (INHALATION) at 03:06

## 2023-06-25 RX ADMIN — ACETYLCYSTEINE 4 ML: 100 INHALANT RESPIRATORY (INHALATION) at 11:06

## 2023-06-25 RX ADMIN — ACETYLCYSTEINE 4 ML: 100 INHALANT RESPIRATORY (INHALATION) at 12:06

## 2023-06-25 NOTE — PLAN OF CARE
PT Re-eval complete, goals remain appropriate    Problem: Physical Therapy  Goal: Physical Therapy Goal  Description: Goals to be met by: 2023     Patient will increase functional independence with mobility by performin. Supine to sit with moderate assistance  2. Sit to supine with moderate assistance  3. Sit to stand transfer with moderate assistance  4. Bed to chair transfer with maximal assistance using LRAD as needed  5. Gait  x 5 feet with maximal assistance using LRAD as needed  6. Sitting at edge of bed x8 minutes with Stand-by Assistance  7. Lower extremity exercise program x10 reps per handout, with independence   Outcome: Ongoing, Progressing

## 2023-06-25 NOTE — SUBJECTIVE & OBJECTIVE
Objective:     Vitals:  Temp: 97.8 °F (36.6 °C)  Pulse: 89  Rhythm: normal sinus rhythm  BP: (!) 149/70  MAP (mmHg): 100  Resp: 17  SpO2: (!) 93 %    Temp  Min: 96.3 °F (35.7 °C)  Max: 98.9 °F (37.2 °C)  Pulse  Min: 75  Max: 100  BP  Min: 103/51  Max: 158/72  MAP (mmHg)  Min: 73  Max: 104  Resp  Min: 11  Max: 25  SpO2  Min: 90 %  Max: 99 %  Oxygen Concentration (%)  Min: 28  Max: 40    06/24 0701 - 06/25 0700  In: 747.2 [I.V.:97.2]  Out: 2050 [Urine:2050]   Unmeasured Output  Urine Occurrence: 1  Stool Occurrence: 0  Emesis Occurrence: 0  Pad Count: 2        Physical Exam  Vitals and nursing note reviewed.   Constitutional:       Appearance: She is ill-appearing.   HENT:      Head: Normocephalic.      Nose: Nose normal.      Mouth/Throat:      Mouth: Mucous membranes are moist.      Pharynx: Oropharynx is clear.   Eyes:      Pupils: Pupils are equal, round, and reactive to light.   Cardiovascular:      Rate and Rhythm: Normal rate and regular rhythm.      Pulses: Normal pulses.      Heart sounds: Normal heart sounds.   Pulmonary:      Effort: Pulmonary effort is normal.      Breath sounds: Normal breath sounds.   Abdominal:      General: Bowel sounds are normal.      Palpations: Abdomen is soft.   Musculoskeletal:         General: Swelling present.   Skin:     General: Skin is warm and dry.      Capillary Refill: Capillary refill takes 2 to 3 seconds.   Neurological:      General: No focal deficit present.      Comments: Alert, oriented x2, FC in all four extremities. CN intact         Medications:  Continuous Scheduledacetylcysteine 100 mg/ml (10%), 4 mL, Q8H  albuterol-ipratropium, 3 mL, Q8H  ARIPiprazole, 5 mg, Daily  famotidine, 20 mg, BID  furosemide (LASIX) injection, 40 mg, Daily  levetiracetam, 1,000 mg, Q12H  mupirocin, , Daily  OLANZapine, 5 mg, BID  rifAXIMin, 550 mg, BID  silodosin, 4 mg, Daily  spironolactone, 50 mg, Daily  traZODone, 25 mg, QHS    PRNsodium chloride, , Q24H PRN  sodium chloride, ,  Q24H PRN  sodium chloride, , Q24H PRN  dextrose 10%, 12.5 g, PRN  dextrose 10%, 25 g, PRN  dextrose, 15 g, PRN  dextrose, 30 g, PRN  magnesium sulfate IVPB, 2 g, PRN  magnesium sulfate IVPB, 4 g, PRN  naloxone, 0.02 mg, PRN  ondansetron, 4 mg, Q8H PRN  oxyCODONE, 5 mg, Q6H PRN  potassium chloride, 40 mEq, PRN   And  potassium chloride, 60 mEq, PRN   And  potassium chloride, 80 mEq, PRN  racepinephrine, 0.5 mL, Q4H PRN  simethicone, 1 tablet, QID PRN  sodium chloride 0.9%, 10 mL, Q8H PRN  sodium phosphate IVPB, 15 mmol, PRN  sodium phosphate IVPB, 20.01 mmol, PRN  sodium phosphate IVPB, 30 mmol, PRN

## 2023-06-25 NOTE — ASSESSMENT & PLAN NOTE
In setting of deconditioning, atelecstasis, pleural effusion    -aggressive pulm toilet and oob  -prn nippv  -diuresis

## 2023-06-25 NOTE — PROCEDURES
DATE: 6/24/23    EEG NUMBER:  -3,  -4    REFERRING PHYSICIAN:  Dr. Andrew     This EEG was performed to assess for subclinical seizures      ELECTROENCEPHALOGRAM REPORT     METHODOLOGY:  Electroencephalographic (EEG) recording is with electrodes placed according to the International 10-20 placement system.  Thirty two (32) channels of digital signal are simultaneously recorded from the scalp and may include EKG, EMG, and/or eye monitors.   Recording band pass was 0.1 to 512 hz.  Digital video recording of the patient is simultaneously recorded with the EEG.  The nursing staff report clinical symptoms and may press an event button when the patient has symptoms of clinical interest to the treating physicians.  EEG and video recording is stored and archived in digital format.  The entire recording is visually reviewed, and the times identified by computer analysis as being spikes or seizures are reviewed again.  Activation procedures which include photic stimulation, hyperventilation and instructing patients to perform simple task are done in selected patients.   Compresses spectral analysis (CSA) is also performed on the activity recorded from each individual channel.  This is displayed as a power display of frequencies from 0 to 30 Hz over time.   The CSA analysis is done and displayed continuously.  This is reviewed for asymmetries in power between homologous areas of the scalp and for presence of changes in power which can be seen when seizures occur.  Sections of suspected abnormalities on the CSA is then compared with the original EEG recording.                [employer] software was also utilized in the review of this study.  This software suite analyzes the EEG recording in multiple domains.  Coherence and rhythmicity is computed to identify EEG sections which may contain organized seizures.  Each channel undergoes analysis to detect presence of spike and sharp waves which have special and  morphological characteristic of epileptic activity.  The routine EEG recording is converted from spacial into frequency domain.  This is then displayed comparing homologous areas to identify areas of significant asymmetry.  Algorithm to identify non-cortically generated artifact is used to separate eye movement, EMG and other artifact from the EEG.     Recording times   Start on June 24, 2023 at hours 7 minute 0 seconds 30   End on June 25, 2023 at hours 7 minute 0 seconds 1  Start on June 25, 2023 at hours 7 minute 0 seconds 38   End on June 25, 2023 at hours 9 minute 52 seconds 25  The total time of EEG recording for the study was 26 hours and 40 minutes    EEG FINDINGS:  The recording was obtained with a number of standard bipolar and referential montages during wakefulness, drowsiness and sleep.  In the alert state the background was mildly disorganized with a nonsustained posterior dominant rhythm of up to 7 hertz which was symmetric.  Mixed theta range slowing was noted in both hemispheres.  During drowsiness generalized slowing of the background noted.  During stage 2 sleep symmetric spindles were noted.   There were no interictal epileptiform abnormalities and no clinical or electrographic seizures were recorded.  Movement and tremor related artifacts were noted throughout the study.      The EKG channel revealed a sinus rhythm.     IMPRESSION:  This is an abnormal EEG during wakefulness, drowsiness and sleep.  Diffuse disorganized low-amplitude slowing of the background was noted     CLINICAL CORRELATION:  The patient is a 58-year-old female with a history of hepatic cirrhosis currently maintained on Keppra.   This is an abnormal EEG during wakefulness, drowsiness and sleep.  The overall degree of disorganization and slowing for given age is suggestive of a mild encephalopathy, likely a toxic metabolic encephalopathy.  There is no evidence of an epileptic process on this recording.  No seizures were  recorded during this study.

## 2023-06-25 NOTE — PLAN OF CARE
[facility] Care Plan    POC reviewed with Marley Hamilton and family at 1400. Pt unable to verbalize understanding. Questions and concerns addressed. No acute events today. Will continue to monitor. See below and flowsheets for full assessment and VS info.   - Pt on 2 L O2 nc  - PT worked with pt  - EEG cap removed            Is this a stroke patient? no    Neuro:  [place] Coma Scale  Best Eye Response: 4-->(E4) spontaneous  Best Motor Response: 6-->(M6) obeys commands  Best Verbal Response: 4-->(V4) confused  [place] Coma Scale Score: 14  Assessment Qualifiers: eye obstruction present  Pupil PERRLA: no     24 hr Temp:  [96.3 °F (35.7 °C)-98.9 °F (37.2 °C)]     CV:   Rhythm: normal sinus rhythm  BP goals:   SBP < 180  MAP > 65    Resp:      Vent Mode: Spont  Set Rate: 16 BPM  Oxygen Concentration (%): 28  Vt Set: 440 mL  PEEP/CPAP: 5 cmH20  Pressure Support: 5 cmH20    Plan: N/A    GI/:     Diet/Nutrition Received: NPO  Last Bowel Movement: 06/21/23  Voiding Characteristics: external catheter    Intake/Output Summary (Last 24 hours) at 6/25/2023 1603  Last data filed at 6/25/2023 1300  Gross per 24 hour   Intake 299.53 ml   Output 2500 ml   Net -2200.47 ml     Unmeasured Output  Urine Occurrence: 1  Stool Occurrence: 0  Emesis Occurrence: 0  Pad Count: 2    Labs/Accuchecks:  Recent Labs   Lab 06/25/23  0127   WBC 3.66*   RBC 2.55*   HGB 7.6*   HCT 24.9*   PLT 47*      Recent Labs   Lab 06/25/23  0127   *   K 4.0   CO2 20*   *   BUN 11   CREATININE 0.4*   ALKPHOS 100   ALT 23   AST 38   BILITOT 7.0*      Recent Labs   Lab 06/21/23  0912 06/22/23  0112 06/25/23  0127   INR  --    < > 1.8*   APTT 33.8*  --   --     < > = values in this interval not displayed.    No results for input(s): CPK, CPKMB, TROPONINI, MB in the last 168 hours.    Electrolytes: N/A - electrolytes WDL  Accuchecks: none    Gtts:      LDA/Wounds:  Lines/Drains/Airways       Drain  Duration             Female External Urinary Catheter  06/22/23 0801 3 days              Peripheral Intravenous Line  Duration                  Peripheral IV - Single Lumen 06/15/23 1229 20 G;1 3/4 in Right Lower leg 10 days         Peripheral IV - Single Lumen 06/21/23 0012 20 G Anterior;Left Upper Arm 4 days         Peripheral IV - Single Lumen 06/25/23 0133 22 G Left;Posterior Forearm <1 day                  Wounds: Yes  Wound care consulted: Yes

## 2023-06-25 NOTE — PT/OT/SLP RE-EVAL
Physical Therapy Re-evaluation    Patient Name:  Marely Hamilton   MRN:  [id number]  Rehab technician utilized to assist w/ treatment session 2/2 pt requiring two person assist for functional mobility   Recommendations:     Discharge Recommendations: nursing facility, skilled  Discharge Equipment Recommendations: to be determined by next level of care   Barriers to discharge:  inc level of assist needed    Assessment:     Marely Hamilton is a 58 y.o. female admitted with a medical diagnosis of Non-convulsive status epilepticus.  She presents with the following impairments/functional limitations: weakness, impaired endurance, impaired functional mobility, impaired sensation, gait instability, impaired balance, decreased coordination, decreased lower extremity function, decreased upper extremity function, decreased safety awareness, decreased ROM, impaired coordination, edema, impaired self care skills, impaired cardiopulmonary response to activity. Pt w/ fair command following and happy to sit up on side of bed. She has edema in all extremities limiting ROM mildly and w/ poor strength in BLE. She was able to sit up EOB for 8min before needing to return to bed 2/2 fatigue and SOB w/ SPO2 decreasing to 88-91% despite RN already having bumped up oxygen earlier in session 2/2 SOB. Rec d/c to SNF for continued treatment as pt motivated to work w/ therapy and w/ significant decline in functional mobility from baseline, but unable to tolerate the 3hrs/day of therapy required for IPR.     Rehab Prognosis:  good; patient would benefit from acute skilled PT services to address these deficits and reach maximum level of function.      Recent Surgery: Procedure(s) (LRB):  EMBOLIZATION, BLOOD VESSEL (N/A) 14 Days Post-Op    Plan:     During this hospitalization, patient to be seen 3 x/week to address the above listed problems via gait training, therapeutic activities, therapeutic exercises, neuromuscular re-education  Plan of Care  Expires:  07/25/23  Plan of Care Reviewed with: patient    Subjective     Communicated with RN prior to session.  Patient found HOB elevated with peripheral IV, pulse ox (continuous), telemetry, blood pressure cuff, oxygen, pressure relief boots, PureWick upon PT entry to room, agreeable to evaluation.      Chief Complaint: fatigue and SOB after sitting up EOB 8min  Patient comments/goals: regain strength and mobility  Pain/Comfort:  Pain Rating 1: 0/10  Pain Rating Post-Intervention 1: 0/10    Patients cultural, spiritual, Denominational conflicts given the current situation: no      Objective:     Patient found with: peripheral IV, pulse ox (continuous), telemetry, blood pressure cuff, oxygen, pressure relief boots, PureWick     General Precautions: Standard, fall  Orthopedic Precautions: N/A  Braces: N/A  Respiratory Status: Nasal cannula, flow 3 L/min (RN bumped up O2 higher than 3L/min during session 2/2 drop in SPO2 when exercising)    Exams:  Cognitive Exam:  Patient is oriented to Person, Place, and year  Sensation:    -       Impaired  light/touch reports of tingling in BLE  RLE ROM: Deficits: mildly limited 2/2 edema; R ankle in PF and INV  RLE Strength: Deficits: 2-/5 grossly  LLE ROM: Deficits: mildly limited 2/2 edema  LLE Strength: Deficits: 2-/5 grossly    Functional Mobility:  Bed Mobility:     Scooting: total assistance and of 2 persons  Supine to Sit: maximal assistance and of 2 persons  Sit to Supine: maximal assistance and of 2 persons  Balance: EOB sitting modA-CGA x8min    AM-PAC 6 CLICK MOBILITY  Total Score:8       Treatment and Education:   Pt educated on PT POC/goals, d/c recs, and continued treatment. All questions answered and pt in agreement w/ POC.   Sitting balance w/ cueing for hand placement to assist w/ balance, attention to task, safety awareness, core activation, deep breathing and TE  AAROM BLE in sitting EOB x5 reps each joint through full ROM  Rolling L/R in bed to reposition pt w/  pillows to offload pressure, avoid muscle contractures, and prevent skin breakdown.    Patient left HOB elevated with all lines intact, call button in reach, and RN present.    GOALS:   Multidisciplinary Problems       Physical Therapy Goals          Problem: Physical Therapy    Goal Priority Disciplines Outcome Goal Variances Interventions   Physical Therapy Goal     PT, PT/OT Ongoing, Progressing     Description: Goals to be met by: 2023     Patient will increase functional independence with mobility by performin. Supine to sit with moderate assistance  2. Sit to supine with moderate assistance  3. Sit to stand transfer with moderate assistance  4. Bed to chair transfer with maximal assistance using LRAD as needed  5. Gait  x 5 feet with maximal assistance using LRAD as needed  6. Sitting at edge of bed x8 minutes with Stand-by Assistance  7. Lower extremity exercise program x10 reps per handout, with independence                        History:     Past Medical History:   Diagnosis Date    Addiction to drug     Alcohol abuse     Alcohol abuse, in remission 6/15/2015    14.5 weeks ago; AA weekly    Anemia     Anxiety 6/15/2015    Behavioral problem     Bipolar disorder     Bipolar disorder in remission 6/15/2015    Cirrhosis, Laennec's 6/15/2015    Depression     Encounter for blood transfusion     Epistaxis 6/15/2015    Fatigue     Glaucoma     Hematuria     Hepatic encephalopathy 6/15/2015    Hepatic enlargement     History of psychiatric care     History of psychiatric hospitalization     History of seizure 6/15/2015    1    hx of intentional Tylenol overdose 6/15/2015    2005- situational and hx of bipolar    Hx of psychiatric care     Macrocytic anemia 2015    6 units PRBC since 2015    Jeana     Osteoarthritis     Other ascites 6/15/2015    Psychiatric exam requested by authority     Psychiatric problem     Psychosis 2019    Renal disorder     Seizures     Self-harming behavior      Suicide attempt     Therapy     Thrombocytopenia 6/15/2015       Past Surgical History:   Procedure Laterality Date    COSMETIC SURGERY      EMBOLIZATION N/A 6/11/2023    Procedure: EMBOLIZATION, BLOOD VESSEL;  Surgeon: Debbie Surgeon;  Location: [facility];  Service: Anesthesiology;  Laterality: N/A;    ESOPHAGOGASTRODUODENOSCOPY         Time Tracking:     PT Received On: 06/25/23  PT Start Time: 1448     PT Stop Time: 1504  PT Total Time (min): 16 min     Billable Minutes: Re-eval 6 and Therapeutic Activity 10      06/25/2023

## 2023-06-25 NOTE — PROGRESS NOTES
[facility]  Neurocritical Care  Progress Note    Admit Date: 5/28/2023  Service Date: 06/25/2023  Length of Stay: 28    Subjective:     Chief Complaint: Non-convulsive status epilepticus    History of Present Illness: Marely Hamilton is a 58 year old female with a medical history significant for EtOH induced hepatic cirrhosis, seizure disorder on outpatient LEV and ZNS and bipolar disorder who was admitted to [facility] on 5/28 as a transfer from OSH for evaluation of persistent encephalopathy concerning for NCSE vs hepatic encephalopathy. History is obtained from chart as patient is unresponsive and unable to assist. Initially presented to [facility] on 5/18 for encephalopathy and thought to be multifactorial including UTI (Proteus mirabilis s/p CTX course), hypercalcemia 2/2 lithium toxicity (Lithium changed to Abilify per Psych), as well as hepatic encephalopathy secondary to medication non compliance. She was treated with lactulose and rifaximin with no improvement in her mental status. EEG showed generalized slowing as well as triphasic discharges in the left hemisphere. MRI Brain WO was unremarkable for acute neurologic change. Decision was made to transfer patient to [facility] for higher level of care and ongoing evaluation and management. On arrival, mental status exam has waxed and waned with patient being intermittently verbal and following commands. NH4 48.    NCC is consulted on hospital day 4 for admission after EEG showed frequent left hemispheric electrographic seizures lasting on average 10-30 seconds with prolonged seizures over 1.5 hours also noted. Per chart review, patient home dose of LEV increased from 1000mg to 1500mg BID, ZNS increased to 200mg. Vimpat 150mg BID added per General Neurology (had not been given prior to consult). On initial evaluation, patient is not responsive to voice or pain. Upward gaze noted. Decision made to transfer patient to [facility] for higher level of care and  airway watch.       Hospital Course: 06/02/2023 Tachycardic and hypotensive, transferred for development of mostly right hemispheric status, LOC exam remains poor  Intubated for airway protection, EEG has changed to mostly include triphasics with no definite seizure activity per Dr Boss, propofol stopped and AEDs simplified to keppra 1000mg bid only, wean off pressor, give colloids with crystalloid resuscitation  06/03/2023: remains on persistant significant pressor support despite adequate hydration, problems with third spacing and may have pulmonary hypertension as well, consider trial of stress steroids if hgb stabilizes  06/04/2023: levo down to 0.08mcg, start and advance TFs, vaso at 0.04U, ammonia has been stable, slight rise in tbili, check direct bili, hgb and plts improved, no clear source of bleeding, no source of bleeding on CT abdomen, consider superimposed hemolysis  06/05/2023 NAEON. Neurologic exam continues to improve, following commands in all extremities. Levo 0.02, weaning as able. Vaso discontinued, MAP>65. Daily SBT, monitor and hold TF at midnight for possible extubation tomorrow. Hemolysis work up ongoing, trending CBC. Continue CTX for SBP prophylaxis.   06/06/2023 Awake and following commands. SBT with low tidal volumes. Remains intubated for airway protection at this time with possible extubation tomorrow with continued neurologic improvement. Levo discontinued, maintaining SBP<65. Concern for hemolytic anemia related to autoimmune process vs hepatic dysfunction (elevated reticulocyte count and decreased haptoglobin).  06/07/2023 Rested on AC overnight due to low TV and fatigue. SBT with pressure support 10/5 this am, weaning ventilator as tolerated. Continue tube feeds with goal to optimize nutrition. Ammonia elevated, continue lactulose to encourage bowel movement.   06/08/2023 Failed SBT due to apnea. Some breaths on SIMV. Awake and following commands. Ammonia trending down (44) with  BMs, continue lactulose. Advance nutritional support with goal to increase strength towards extubation. Plt transfusions PRN. Cryo for fibrinogen <100.  06/09/2023 Venous US with R brachial and femoral DVT. Dicussed with Hematology with recommendation to start Argatroban with plts>50. Daily SBT with apnea, maintain on SIMV with backup rate of 10. Hyperammonemia resolved.   06/10/2023 patient is more alert and awake, tolerated SBT. Hematology agreed to switch to heparin for DVT given negative for HIT  06/11/2023   On AM rounds, patient was noted to be hypoxic and tachycardic. Hypoxia resolved with ventilator changes but patient continued to appear uncomfortable. Patient lied flat with noted improvement in oxygenation. Patient became acutely hypotensive, tachycardiac and hypoxic not responsive to ventilator changes. IVF bolus + Burton bump x3 + initiation of vasopressin due to concern for hypovolemic shock. Levophed added due to persistent hypertension. L femoral arterial line and R IJ trialysis placed emergently. STAT Hemoglobin 4.4. Received protamine for heparin reversal, 3u Plts and 3 FFP and 2 pRBC. STAT CTA abdo/pelvis with L retroperitoneal hematoma with + spot sign. IR consulted and patient taken class A for diagnostic angiogram with possible emobolization. Pressors weaned after volume resuscitation. Family updated over phone.   06/12/2023 s/p IR embolization of left lumbar and internal iliac branch foci of arterial extravasation. Hemoglobin stable, continue to trend CBC q8 with transfusion of pRBC for Hg<7. Restart trickle feeds. Albumin and lasix for dieresis. Low threshold for antibiotics given risk of SBP and RP hematoma. Unlikely to tolerate AC, will discuss placement of IVC filter with IR.   06/13/2023 Attempted to wean FiO2 overnight with noted drop of pO2. FiO2 increased to 55% and patient received albumin and was diuresed. Weaning this am with ABGs. Remains on fentanyl 12.5, neurologic exam unchanged. Hg  stable. Post AM rounds, patient with acute increase in O2 needs, STAT CTA chest/abd/pelvis to assess for PE vs further hemorrhage stable and without new findings. Continue current management.   06/14/2023 NAEON. Hg stable at 7.6. Plt 44, transfuse for <50. Pending IVC filter per IR for DVT burden with contraindications to AC.  06/15/2023 s/p IVC filter placement. Daily SBT, complicated by anxiety. Remains on spontaneous mode, 10/5, 40% FiO2 with goal to wean as able.   06/16/2023 Rested on AC overnight. Plts low, received 1 pack overnight. Daily SBT with plan to extubate as able.   06/17/2023 extubated on BiPap yesterday, weaning off FiO2. Plts trended down again, received transfusion overnight, no sign of active bleeding.   06/18/2023 Hg 6.7, receiving 1u pRBC. Plan to wean Bipap to high flow NC.  06/19/2023: down to 4L with sats at 100%, still requiring occasional transfusion, otherwise awake and able to interact appropriately with examiner  06/20/2023: Increasing O2 requirements, CXR with atelectasis, continued pancytopenia.  06/21/2023: Continued hypoxia. Bilirubin trending up. Spironolactone increased, lasix 20mg x 1 ordered. D5W bolus. Ammonia level WNL. ABG and CXR ordered.   6/22/23: intubated overnight  6/23/23: Extubated. CPAP QHS. Cryo x1 given.   06/24/2023 tolerating extubation  06/25/2023 tolerating extubation, bili downtrending, continued resp insufficiency          Objective:     Vitals:  Temp: 97.8 °F (36.6 °C)  Pulse: 89  Rhythm: normal sinus rhythm  BP: (!) 149/70  MAP (mmHg): 100  Resp: 17  SpO2: (!) 93 %    Temp  Min: 96.3 °F (35.7 °C)  Max: 98.9 °F (37.2 °C)  Pulse  Min: 75  Max: 100  BP  Min: 103/51  Max: 158/72  MAP (mmHg)  Min: 73  Max: 104  Resp  Min: 11  Max: 25  SpO2  Min: 90 %  Max: 99 %  Oxygen Concentration (%)  Min: 28  Max: 40    06/24 0701 - 06/25 0700  In: 747.2 [I.V.:97.2]  Out: 2050 [Urine:2050]   Unmeasured Output  Urine Occurrence: 1  Stool Occurrence: 0  Emesis Occurrence:  0  Pad Count: 2        Physical Exam  Vitals and nursing note reviewed.   Constitutional:       Appearance: She is ill-appearing.   HENT:      Head: Normocephalic.      Nose: Nose normal.      Mouth/Throat:      Mouth: Mucous membranes are moist.      Pharynx: Oropharynx is clear.   Eyes:      Pupils: Pupils are equal, round, and reactive to light.   Cardiovascular:      Rate and Rhythm: Normal rate and regular rhythm.      Pulses: Normal pulses.      Heart sounds: Normal heart sounds.   Pulmonary:      Effort: Pulmonary effort is normal.      Breath sounds: Normal breath sounds.   Abdominal:      General: Bowel sounds are normal.      Palpations: Abdomen is soft.   Musculoskeletal:         General: Swelling present.   Skin:     General: Skin is warm and dry.      Capillary Refill: Capillary refill takes 2 to 3 seconds.   Neurological:      General: No focal deficit present.      Comments: Alert, oriented x2, FC in all four extremities. CN intact         Medications:  Continuous Scheduledacetylcysteine 100 mg/ml (10%), 4 mL, Q8H  albuterol-ipratropium, 3 mL, Q8H  ARIPiprazole, 5 mg, Daily  famotidine, 20 mg, BID  furosemide (LASIX) injection, 40 mg, Daily  levetiracetam, 1,000 mg, Q12H  mupirocin, , Daily  OLANZapine, 5 mg, BID  rifAXIMin, 550 mg, BID  silodosin, 4 mg, Daily  spironolactone, 50 mg, Daily  traZODone, 25 mg, QHS    PRNsodium chloride, , Q24H PRN  sodium chloride, , Q24H PRN  sodium chloride, , Q24H PRN  dextrose 10%, 12.5 g, PRN  dextrose 10%, 25 g, PRN  dextrose, 15 g, PRN  dextrose, 30 g, PRN  magnesium sulfate IVPB, 2 g, PRN  magnesium sulfate IVPB, 4 g, PRN  naloxone, 0.02 mg, PRN  ondansetron, 4 mg, Q8H PRN  oxyCODONE, 5 mg, Q6H PRN  potassium chloride, 40 mEq, PRN   And  potassium chloride, 60 mEq, PRN   And  potassium chloride, 80 mEq, PRN  racepinephrine, 0.5 mL, Q4H PRN  simethicone, 1 tablet, QID PRN  sodium chloride 0.9%, 10 mL, Q8H PRN  sodium phosphate IVPB, 15 mmol, PRN  sodium phosphate  IVPB, 20.01 mmol, PRN  sodium phosphate IVPB, 30 mmol, PRN            Assessment/Plan:     Neuro  Acute encephalopathy  Multifactorial     -delirium precautions    Pulmonary  Acute respiratory insufficiency  In setting of deconditioning, atelecstasis, pleural effusion    -aggressive pulm toilet and oob  -prn nippv  -diuresis    Acute respiratory failure with hypoxia-resolved as of 6/25/2023  Tolerating extubation    -aggressive pulm toilet, oob  -prn nippv, cpap qhs      Hematology  Deep vein thrombosis (DVT) of femoral vein of right lower extremity  Nonocclusive right proximal femoral vein DVT  Noted on 6/8  Argatroban per Heme recs - transitioned to heparin  HELD 6/11 in setting of hypovolemic shock 2/2 retroperitoneal hemorrhage   IVC filter placed 6/14  Unable to anticoagulate due to continued pancytopenia/thrombocytopenia    Thrombocytopenia  Likely secondary to hepatic cirrhosis and development of splenomegaly  Hematology consulted, tested negative for HIT    6/11 --> acute hypotension, hypoxia --> hypovolemic shock --> Large R peritoneal hematoma --> IR for class A embo --> s/p IR embolization of left lumbar and internal iliac branch foci of arterial extravasation    Plts continue to trending down despite transfusion, however, no active bleeding. Will transfuse if plt < 25, or < 50 with active bleeding    GI  Acute liver failure without hepatic coma  Cont spironolactone and lasix  Bili downtrending  INR improving          The patient is being Prophylaxed for:  Venous Thromboembolism with: Mechanical  Stress Ulcer with: Not Applicable   Ventilator Pneumonia with: not applicable    Activity Orders          Diet Dysphagia Pureed (IDDSI Level 4) Thin: Dysphagia 1 (Pureed) starting at 06/25 1345    Discontinue arterial line starting at 06/18 1927    Elevate HOB flat until bedrest complete starting at 06/11 1641    Turn patient starting at 05/28 2253    Progressive Mobility Protocol (mobilize patient to their  highest level of functioning at least twice daily) starting at 05/28 2000        Full Code    Critical condition in that Patient has a condition that poses threat to life and bodily function: as above, high risk for acute hypoxic respiratory failure     33 minutes of Critical care time was spent personally by me on the following activities: development of treatment plan with patient or surrogate and bedside caregivers, discussions with consultants, evaluation of patient's response to treatment, examination of patient, ordering and performing treatments and interventions, ordering and review of laboratory studies, ordering and review of radiographic studies, pulse oximetry, antibiotic titration if applicable, vasopressor titration if applicable, re-evaluation of patient's condition. This critical care time did not overlap with that of any other provider or involve time for any procedures. There is high probability for acute neurological change leading to clinical and possibly life-threatening deterioration requiring highest level of physician preparedness for urgent intervention.      Nakul Casey MD  Neurocritical Care  [facility]

## 2023-06-25 NOTE — PT/OT/SLP EVAL
Speech Language Pathology Evaluation  Bedside Swallow    Patient Name:  Marely Hamilton   MRN:  [id number]  Admitting Diagnosis: Non-convulsive status epilepticus    Recommendations:                 General Recommendations:  Dysphagia therapy  Diet recommendations:  Puree, Thin   Aspiration Precautions: Feed only when awake/alert, HOB to 90 degrees, Small bites/sips, and Standard aspiration precautions   General Precautions: Standard, fall  Communication strategies:  none    Assessment:     Marely Hamilton is a 58 y.o. female with an SLP diagnosis of Dysphagia.    History:     Past Medical History:   Diagnosis Date    Addiction to drug     Alcohol abuse     Alcohol abuse, in remission 6/15/2015    14.5 weeks ago; AA weekly    Anemia     Anxiety 6/15/2015    Behavioral problem     Bipolar disorder     Bipolar disorder in remission 6/15/2015    Cirrhosis, Laennec's 6/15/2015    Depression     Encounter for blood transfusion     Epistaxis 6/15/2015    Fatigue     Glaucoma     Hematuria     Hepatic encephalopathy 6/15/2015    Hepatic enlargement     History of psychiatric care     History of psychiatric hospitalization     History of seizure 6/15/2015    1    hx of intentional Tylenol overdose 6/15/2015    2005- situational and hx of bipolar    Hx of psychiatric care     Macrocytic anemia 9/18/2015    6 units PRBC since June 2015    Jeana     Osteoarthritis     Other ascites 6/15/2015    Psychiatric exam requested by authority     Psychiatric problem     Psychosis 9/26/2019    Renal disorder     Seizures     Self-harming behavior     Suicide attempt     Therapy     Thrombocytopenia 6/15/2015       Past Surgical History:   Procedure Laterality Date    COSMETIC SURGERY      EMBOLIZATION N/A 6/11/2023    Procedure: EMBOLIZATION, BLOOD VESSEL;  Surgeon: Debbie Surgeon;  Location: [facility];  Service: Anesthesiology;  Laterality: N/A;    ESOPHAGOGASTRODUODENOSCOPY           Prior Intubation HX:  6/22-6/23      Chest X-Rays: none  post extubation    Prior diet: mechanical soft/thin prior to intubation.    Subjective     Awake/alert    Pain/Comfort:  Pain Rating 1: 0/10  Pain Rating Post-Intervention 1: 0/10    Respiratory Status: Nasal cannula    Objective:     Oral Musculature Evaluation  Oral Musculature: unable to assess due to poor participation/comprehension  Dentition: present and adequate  Secretion Management: adequate  Mucosal Quality: dry  Mandibular Strength and Mobility: WFL  Oral Labial Strength and Mobility: WFL  Lingual Strength and Mobility: WFL  Velar Elevation: WFL  Buccal Strength and Mobility: WFL  Volitional Cough: not elicited  Volitional Swallow: not elicited  Voice Prior to PO Intake: wfl based on limited sample    Bedside Swallow Eval:   Consistencies Assessed:  Thin liquids x6 oz   Puree x2      Oral Phase:   WFL    Pharyngeal Phase:   no overt clinical signs/symptoms of aspiration      Goals:   Multidisciplinary Problems       SLP Goals          Problem: SLP    Goal Priority Disciplines Outcome   SLP Goal     SLP Ongoing, Progressing   Description: Goals due 7/2  1.  Pt. Will participate in ongoing assessment of swallow to initiate least restrictive diet                   Multidisciplinary Problems (Resolved)          Problem: SLP    Goal Priority Disciplines Outcome   SLP Goal   (Resolved)     SLP Met                       Plan:     Patient to be seen:  4 x/week   Plan of Care expires:  07/24/23  Plan of Care reviewed with:  patient   SLP Follow-Up:  Yes       Discharge recommendations:  nursing facility, skilled     Time Tracking:     SLP Treatment Date:   06/25/23  Speech Start Time:  0920  Speech Stop Time:  0931     Speech Total Time (min):  11 min    Billable Minutes: Eval Swallow and Oral Function 11    06/25/2023

## 2023-06-25 NOTE — PLAN OF CARE
"[facility] Care Plan    POC reviewed with Marely Hamilton and family at 0300. Pt verbalized understanding but is confused.    - Disoriented to situation. Confused.   - CPAP not tolerated by pt. RT called to bedside. Alarming "low leak co2 re inhalation risk". RT changed mask but alarm persisted. Pt o2 sats> 90% on 2 liters nasal cannula.  - Warming blanket applied at beginning of shift for a temp of 96.3. Provider notified.  - Pt tolerated meds crushed in apple sauce.  - Denies pain. Midnight oxy held.  - Mag 1.8 and phos 2.5 - electrolytes replaced  - New PIV to left wrist started  - bed bath and linen change performed.   - Sister updated by phone. Sister is concerned regarding pt nutrition and speech consult. Escalating speech consult to charge nurse.   - cEEG in place    Questions and concerns addressed. No acute events overnight. Pt progressing toward goals. Will continue to monitor. See below and flowsheets for full assessment and VS info.           Is this a stroke patient? no    Neuro:  Cheryl Coma Scale  Best Eye Response: 4-->(E4) spontaneous  Best Motor Response: 6-->(M6) obeys commands  Best Verbal Response: 4-->(V4) confused  Dakota Coma Scale Score: 14  Assessment Qualifiers: patient not sedated/intubated  Pupil PERRLA: no     24hr Temp:  [96.3 °F (35.7 °C)-98.9 °F (37.2 °C)]     CV:   Rhythm: normal sinus rhythm  BP goals:   SBP < 180  MAP > 65    Resp:      Vent Mode: Spont  Set Rate: 16 BPM  Oxygen Concentration (%): 28  Vt Set: 440 mL  PEEP/CPAP: 5 cmH20  Pressure Support: 5 cmH20    Plan:  02 sats> 90%    GI/:     Diet/Nutrition Received: NPO  Last Bowel Movement: 06/21/23  Voiding Characteristics: external catheter    Intake/Output Summary (Last 24 hours) at 6/25/2023 0314  Last data filed at 6/25/2023 0305  Gross per 24 hour   Intake 470.61 ml   Output 1800 ml   Net -1329.39 ml     Unmeasured Output  Urine Occurrence: 1  Stool Occurrence: 0  Emesis Occurrence: 0  Pad Count: " 2    Labs/Accuchecks:  Recent Labs   Lab 06/25/23  0127   WBC 3.66*   RBC 2.55*   HGB 7.6*   HCT 24.9*   PLT 47*      Recent Labs   Lab 06/25/23  0127   *   K 4.0   CO2 20*   *   BUN 11   CREATININE 0.4*   ALKPHOS 100   ALT 23   AST 38   BILITOT 7.0*      Recent Labs   Lab 06/21/23  0912 06/22/23  0112 06/25/23  0127   INR  --    < > 1.8*   APTT 33.8*  --   --     < > = values in this interval not displayed.    No results for input(s): CPK, CPKMB, TROPONINI, MB in the last 168 hours.    Electrolytes: Electrolytes replaced  Accuchecks: none    Gtts:      LDA/Wounds:  Lines/Drains/Airways       Drain  Duration             Female External Urinary Catheter 06/22/23 0801 2 days              Peripheral Intravenous Line  Duration                  Peripheral IV - Single Lumen 06/15/23 1229 20 G;1 3/4 in Right Lower leg 9 days         Peripheral IV - Single Lumen 06/21/23 0012 20 G Anterior;Left Upper Arm 4 days         Peripheral IV - Single Lumen 06/25/23 0133 22 G Left;Posterior Forearm <1 day                  Wounds: Yes  Wound care consulted: Yes

## 2023-06-26 LAB
ABO + RH BLD: NORMAL
ALBUMIN SERPL BCP-MCNC: 3 G/DL (ref 3.5–5.2)
ALP SERPL-CCNC: 120 U/L (ref 55–135)
ALT SERPL W/O P-5'-P-CCNC: 22 U/L (ref 10–44)
ANION GAP SERPL CALC-SCNC: 14 MMOL/L (ref 8–16)
AST SERPL-CCNC: 35 U/L (ref 10–40)
BASOPHILS # BLD AUTO: 0.02 K/UL (ref 0–0.2)
BASOPHILS NFR BLD: 0.5 % (ref 0–1.9)
BILIRUB SERPL-MCNC: 7.8 MG/DL (ref 0.1–1)
BLD GP AB SCN CELLS X3 SERPL QL: NORMAL
BUN SERPL-MCNC: 12 MG/DL (ref 6–20)
CALCIUM SERPL-MCNC: 8.7 MG/DL (ref 8.7–10.5)
CHLORIDE SERPL-SCNC: 117 MMOL/L (ref 95–110)
CO2 SERPL-SCNC: 17 MMOL/L (ref 23–29)
CREAT SERPL-MCNC: 0.4 MG/DL (ref 0.5–1.4)
DIFFERENTIAL METHOD: ABNORMAL
EOSINOPHIL # BLD AUTO: 0.1 K/UL (ref 0–0.5)
EOSINOPHIL NFR BLD: 1.1 % (ref 0–8)
ERYTHROCYTE [DISTWIDTH] IN BLOOD BY AUTOMATED COUNT: 27.6 % (ref 11.5–14.5)
EST. GFR  (NO RACE VARIABLE): >60 ML/MIN/1.73 M^2
GLUCOSE SERPL-MCNC: 146 MG/DL (ref 70–110)
HCT VFR BLD AUTO: 27.4 % (ref 37–48.5)
HGB BLD-MCNC: 8.4 G/DL (ref 12–16)
IMM GRANULOCYTES # BLD AUTO: 0.04 K/UL (ref 0–0.04)
IMM GRANULOCYTES NFR BLD AUTO: 0.9 % (ref 0–0.5)
INR PPP: 1.8 (ref 0.8–1.2)
LYMPHOCYTES # BLD AUTO: 1 K/UL (ref 1–4.8)
LYMPHOCYTES NFR BLD: 22.5 % (ref 18–48)
MAGNESIUM SERPL-MCNC: 2 MG/DL (ref 1.6–2.6)
MCH RBC QN AUTO: 30.5 PG (ref 27–31)
MCHC RBC AUTO-ENTMCNC: 30.7 G/DL (ref 32–36)
MCV RBC AUTO: 100 FL (ref 82–98)
MONOCYTES # BLD AUTO: 0.5 K/UL (ref 0.3–1)
MONOCYTES NFR BLD: 10.7 % (ref 4–15)
NEUTROPHILS # BLD AUTO: 2.8 K/UL (ref 1.8–7.7)
NEUTROPHILS NFR BLD: 64.3 % (ref 38–73)
NRBC BLD-RTO: 0 /100 WBC
PHOSPHATE SERPL-MCNC: 3 MG/DL (ref 2.7–4.5)
PLATELET # BLD AUTO: 57 K/UL (ref 150–450)
PMV BLD AUTO: 9.6 FL (ref 9.2–12.9)
POTASSIUM SERPL-SCNC: 3.5 MMOL/L (ref 3.5–5.1)
POTASSIUM SERPL-SCNC: 4.2 MMOL/L (ref 3.5–5.1)
PROT SERPL-MCNC: 5.7 G/DL (ref 6–8.4)
PROTHROMBIN TIME: 19 SEC (ref 9–12.5)
RBC # BLD AUTO: 2.75 M/UL (ref 4–5.4)
SODIUM SERPL-SCNC: 148 MMOL/L (ref 136–145)
SPECIMEN OUTDATE: NORMAL
WBC # BLD AUTO: 4.4 K/UL (ref 3.9–12.7)

## 2023-06-26 PROCEDURE — 85025 COMPLETE CBC W/AUTO DIFF WBC: CPT

## 2023-06-26 PROCEDURE — 97535 SELF CARE MNGMENT TRAINING: CPT

## 2023-06-26 PROCEDURE — 27000221 HC OXYGEN, UP TO 24 HOURS

## 2023-06-26 PROCEDURE — 92526 ORAL FUNCTION THERAPY: CPT

## 2023-06-26 PROCEDURE — 85610 PROTHROMBIN TIME: CPT | Performed by: NURSE PRACTITIONER

## 2023-06-26 PROCEDURE — 83735 ASSAY OF MAGNESIUM: CPT

## 2023-06-26 PROCEDURE — 20000000 HC ICU ROOM

## 2023-06-26 PROCEDURE — 84132 ASSAY OF SERUM POTASSIUM: CPT

## 2023-06-26 PROCEDURE — 84100 ASSAY OF PHOSPHORUS: CPT

## 2023-06-26 PROCEDURE — 94668 MNPJ CHEST WALL SBSQ: CPT

## 2023-06-26 PROCEDURE — 63600175 PHARM REV CODE 636 W HCPCS: Performed by: PHYSICIAN ASSISTANT

## 2023-06-26 PROCEDURE — 80053 COMPREHEN METABOLIC PANEL: CPT

## 2023-06-26 PROCEDURE — 99900035 HC TECH TIME PER 15 MIN (STAT)

## 2023-06-26 PROCEDURE — 63600175 PHARM REV CODE 636 W HCPCS

## 2023-06-26 PROCEDURE — 99233 PR SUBSEQUENT HOSPITAL CARE,LEVL III: ICD-10-PCS | Mod: ,,, | Performed by: PSYCHIATRY & NEUROLOGY

## 2023-06-26 PROCEDURE — 94640 AIRWAY INHALATION TREATMENT: CPT

## 2023-06-26 PROCEDURE — 25000003 PHARM REV CODE 250: Performed by: PSYCHIATRY & NEUROLOGY

## 2023-06-26 PROCEDURE — 25000003 PHARM REV CODE 250: Performed by: NURSE PRACTITIONER

## 2023-06-26 PROCEDURE — 94761 N-INVAS EAR/PLS OXIMETRY MLT: CPT

## 2023-06-26 PROCEDURE — 99233 SBSQ HOSP IP/OBS HIGH 50: CPT | Mod: ,,, | Performed by: PSYCHIATRY & NEUROLOGY

## 2023-06-26 PROCEDURE — 25000242 PHARM REV CODE 250 ALT 637 W/ HCPCS: Performed by: PSYCHIATRY & NEUROLOGY

## 2023-06-26 PROCEDURE — 97168 OT RE-EVAL EST PLAN CARE: CPT

## 2023-06-26 PROCEDURE — 86900 BLOOD TYPING SEROLOGIC ABO: CPT

## 2023-06-26 PROCEDURE — 94660 CPAP INITIATION&MGMT: CPT

## 2023-06-26 RX ORDER — TRAZODONE HYDROCHLORIDE 50 MG/1
50 TABLET ORAL NIGHTLY
Status: DISCONTINUED | OUTPATIENT
Start: 2023-06-26 | End: 2023-06-30 | Stop reason: HOSPADM

## 2023-06-26 RX ORDER — FUROSEMIDE 40 MG/1
40 TABLET ORAL 2 TIMES DAILY
Status: DISCONTINUED | OUTPATIENT
Start: 2023-06-26 | End: 2023-06-30 | Stop reason: HOSPADM

## 2023-06-26 RX ORDER — BACITRACIN ZINC 500 [USP'U]/G
OINTMENT TOPICAL 3 TIMES DAILY
Status: DISCONTINUED | OUTPATIENT
Start: 2023-06-26 | End: 2023-06-30 | Stop reason: HOSPADM

## 2023-06-26 RX ADMIN — RIFAXIMIN 550 MG: 550 TABLET ORAL at 08:06

## 2023-06-26 RX ADMIN — BACITRACIN 1 EACH: 500 OINTMENT TOPICAL at 02:06

## 2023-06-26 RX ADMIN — POTASSIUM CHLORIDE 10 MEQ: 7.46 INJECTION, SOLUTION INTRAVENOUS at 05:06

## 2023-06-26 RX ADMIN — POTASSIUM CHLORIDE 10 MEQ: 7.46 INJECTION, SOLUTION INTRAVENOUS at 01:06

## 2023-06-26 RX ADMIN — IPRATROPIUM BROMIDE AND ALBUTEROL SULFATE 3 ML: .5; 3 SOLUTION RESPIRATORY (INHALATION) at 08:06

## 2023-06-26 RX ADMIN — FAMOTIDINE 20 MG: 20 TABLET, FILM COATED ORAL at 08:06

## 2023-06-26 RX ADMIN — OLANZAPINE 5 MG: 2.5 TABLET, FILM COATED ORAL at 08:06

## 2023-06-26 RX ADMIN — FUROSEMIDE 40 MG: 40 TABLET ORAL at 05:06

## 2023-06-26 RX ADMIN — ACETYLCYSTEINE 4 ML: 100 INHALANT RESPIRATORY (INHALATION) at 08:06

## 2023-06-26 RX ADMIN — POTASSIUM CHLORIDE 10 MEQ: 7.46 INJECTION, SOLUTION INTRAVENOUS at 02:06

## 2023-06-26 RX ADMIN — IPRATROPIUM BROMIDE AND ALBUTEROL SULFATE 3 ML: .5; 3 SOLUTION RESPIRATORY (INHALATION) at 04:06

## 2023-06-26 RX ADMIN — POTASSIUM CHLORIDE 10 MEQ: 7.46 INJECTION, SOLUTION INTRAVENOUS at 03:06

## 2023-06-26 RX ADMIN — ACETYLCYSTEINE 4 ML: 100 INHALANT RESPIRATORY (INHALATION) at 04:06

## 2023-06-26 RX ADMIN — MUPIROCIN: 20 OINTMENT TOPICAL at 08:06

## 2023-06-26 RX ADMIN — LEVETIRACETAM 1000 MG: 500 SOLUTION ORAL at 08:06

## 2023-06-26 RX ADMIN — ARIPIPRAZOLE 5 MG: 5 TABLET ORAL at 08:06

## 2023-06-26 RX ADMIN — BACITRACIN 1 EACH: 500 OINTMENT TOPICAL at 08:06

## 2023-06-26 RX ADMIN — SILODOSIN 4 MG: 4 CAPSULE ORAL at 08:06

## 2023-06-26 RX ADMIN — IPRATROPIUM BROMIDE AND ALBUTEROL SULFATE 3 ML: .5; 3 SOLUTION RESPIRATORY (INHALATION) at 11:06

## 2023-06-26 RX ADMIN — ACETYLCYSTEINE 4 ML: 100 INHALANT RESPIRATORY (INHALATION) at 11:06

## 2023-06-26 RX ADMIN — OXYCODONE HYDROCHLORIDE 5 MG: 5 TABLET ORAL at 08:06

## 2023-06-26 RX ADMIN — TRAZODONE HYDROCHLORIDE 50 MG: 50 TABLET ORAL at 08:06

## 2023-06-26 RX ADMIN — SPIRONOLACTONE 50 MG: 25 TABLET, FILM COATED ORAL at 08:06

## 2023-06-26 RX ADMIN — FUROSEMIDE 40 MG: 10 INJECTION, SOLUTION INTRAMUSCULAR; INTRAVENOUS at 08:06

## 2023-06-26 NOTE — PLAN OF CARE
06/26/23 1124   Post-Acute Status   Post-Acute Authorization Placement   Coverage [employer]   Discharge Delays None known at this time   Discharge Plan   Discharge Plan A Skilled Nursing Facility     [facility] Phone: [phone]  -Referral Received  Comments: still in ICU 6/23       SW will continue to follow.      Danyell Palafox LMSW  PRN - [facility]  [id number]

## 2023-06-26 NOTE — PROGRESS NOTES
[facility] - Neuro Critical Care  Neurocritical Care  Progress Note    Admit Date: 5/28/2023  Service Date: 06/26/2023  Length of Stay: 29    Subjective:     Chief Complaint: Non-convulsive status epilepticus    History of Present Illness: Maerly Haimlton is a 58 year old female with a medical history significant for EtOH induced hepatic cirrhosis, seizure disorder on outpatient LEV and ZNS and bipolar disorder who was [facility] [place]28 as a transfer from OSH for evaluation of persistent encephalopathy concerning for NCSE vs hepatic encephalopathy. History is obtained from chart as patient is unresponsive and unable to assist. Initially presented to [facility] on 5/18 for encephalopathy and thought to be multifactorial including UTI (Proteus mirabilis s/p CTX course), hypercalcemia 2/2 lithium toxicity (Lithium changed to Abilify per Psych), as well as hepatic encephalopathy secondary to medication non compliance. She was treated with lactulose and rifaximin with no improvement in her mental status. EEG showed generalized slowing as well as triphasic discharges in the left hemisphere. MRI Brain WO was unremarkable for acute neurologic change. Decision was made to transfer patient to [facility] for higher level of care and ongoing evaluation and management. On arrival, mental status exam has waxed and waned with patient being intermittently verbal and following commands. NH4 48.    NCC is consulted on hospital day 4 for admission after EEG showed frequent left hemispheric electrographic seizures lasting on average 10-30 seconds with prolonged seizures over 1.5 hours also noted. Per chart review, patient home dose of LEV increased from 1000mg to 1500mg BID, ZNS increased to 200mg. Vimpat 150mg BID added per General Neurology (had not been given prior to consult). On initial evaluation, patient is not responsive to voice or pain. Upward gaze noted. Decision made to transfer patient to [facility] for higher level of care and  airway watch.       Hospital Course: 06/02/2023 Tachycardic and hypotensive, transferred for development of mostly right hemispheric status, LOC exam remains poor  Intubated for airway protection, EEG has changed to mostly include triphasics with no definite seizure activity per Dr Boss, propofol stopped and AEDs simplified to keppra 1000mg bid only, wean off pressor, give colloids with crystalloid resuscitation  06/03/2023: remains on persistant significant pressor support despite adequate hydration, problems with third spacing and may have pulmonary hypertension as well, consider trial of stress steroids if hgb stabilizes  06/04/2023: levo down to 0.08mcg, start and advance TFs, vaso at 0.04U, ammonia has been stable, slight rise in tbili, check direct bili, hgb and plts improved, no clear source of bleeding, no source of bleeding on CT abdomen, consider superimposed hemolysis  06/05/2023 NAEON. Neurologic exam continues to improve, following commands in all extremities. Levo 0.02, weaning as able. Vaso discontinued, MAP>65. Daily SBT, monitor and hold TF at midnight for possible extubation tomorrow. Hemolysis work up ongoing, trending CBC. Continue CTX for SBP prophylaxis.   06/06/2023 Awake and following commands. SBT with low tidal volumes. Remains intubated for airway protection at this time with possible extubation tomorrow with continued neurologic improvement. Levo discontinued, maintaining SBP<65. Concern for hemolytic anemia related to autoimmune process vs hepatic dysfunction (elevated reticulocyte count and decreased haptoglobin).  06/07/2023 Rested on AC overnight due to low TV and fatigue. SBT with pressure support 10/5 this am, weaning ventilator as tolerated. Continue tube feeds with goal to optimize nutrition. Ammonia elevated, continue lactulose to encourage bowel movement.   06/08/2023 Failed SBT due to apnea. Some breaths on SIMV. Awake and following commands. Ammonia trending down (44) with  BMs, continue lactulose. Advance nutritional support with goal to increase strength towards extubation. Plt transfusions PRN. Cryo for fibrinogen <100.  06/09/2023 Venous US with R brachial and femoral DVT. Dicussed with Hematology with recommendation to start Argatroban with plts>50. Daily SBT with apnea, maintain on SIMV with backup rate of 10. Hyperammonemia resolved.   06/10/2023 patient is more alert and awake, tolerated SBT. Hematology agreed to switch to heparin for DVT given negative for HIT  06/11/2023   On AM rounds, patient was noted to be hypoxic and tachycardic. Hypoxia resolved with ventilator changes but patient continued to appear uncomfortable. Patient lied flat with noted improvement in oxygenation. Patient became acutely hypotensive, tachycardiac and hypoxic not responsive to ventilator changes. IVF bolus + Burton bump x3 + initiation of vasopressin due to concern for hypovolemic shock. Levophed added due to persistent hypertension. L femoral arterial line and R IJ trialysis placed emergently. STAT Hemoglobin 4.4. Received protamine for heparin reversal, 3u Plts and 3 FFP and 2 pRBC. STAT CTA abdo/pelvis with L retroperitoneal hematoma with + spot sign. IR consulted and patient taken class A for diagnostic angiogram with possible emobolization. Pressors weaned after volume resuscitation. Family updated over phone.   06/12/2023 s/p IR embolization of left lumbar and internal iliac branch foci of arterial extravasation. Hemoglobin stable, continue to trend CBC q8 with transfusion of pRBC for Hg<7. Restart trickle feeds. Albumin and lasix for dieresis. Low threshold for antibiotics given risk of SBP and RP hematoma. Unlikely to tolerate AC, will discuss placement of IVC filter with IR.   06/13/2023 Attempted to wean FiO2 overnight with noted drop of pO2. FiO2 increased to 55% and patient received albumin and was diuresed. Weaning this am with ABGs. Remains on fentanyl 12.5, neurologic exam unchanged. Hg  stable. Post AM rounds, patient with acute increase in O2 needs, STAT CTA chest/abd/pelvis to assess for PE vs further hemorrhage stable and without new findings. Continue current management.   06/14/2023 NAEON. Hg stable at 7.6. Plt 44, transfuse for <50. Pending IVC filter per IR for DVT burden with contraindications to AC.  06/15/2023 s/p IVC filter placement. Daily SBT, complicated by anxiety. Remains on spontaneous mode, 10/5, 40% FiO2 with goal to wean as able.   06/16/2023 Rested on AC overnight. Plts low, received 1 pack overnight. Daily SBT with plan to extubate as able.   06/17/2023 extubated on BiPap yesterday, weaning off FiO2. Plts trended down again, received transfusion overnight, no sign of active bleeding.   06/18/2023 Hg 6.7, receiving 1u pRBC. Plan to wean Bipap to high flow NC.  06/19/2023: down to 4L with sats at 100%, still requiring occasional transfusion, otherwise awake and able to interact appropriately with examiner  06/20/2023: Increasing O2 requirements, CXR with atelectasis, continued pancytopenia.  06/21/2023: Continued hypoxia. Bilirubin trending up. Spironolactone increased, lasix 20mg x 1 ordered. D5W bolus. Ammonia level WNL. ABG and CXR ordered.   6/22/23: intubated overnight  6/23/23: Extubated. CPAP QHS. Cryo x1 given.   06/24/2023 tolerating extubation  06/25/2023 tolerating extubation, bili downtrending, continued resp insufficiency  06/26/2023 naeon, tolerating cpap at night, continued resp insufficiency          Objective:     Vitals:  Temp: 97.9 °F (36.6 °C)  Pulse: 92  Rhythm: normal sinus rhythm  BP: 120/60  MAP (mmHg): 87  Resp: 12  SpO2: 95 %  Oxygen Concentration (%): 100  Vent Mode: A/C  Set Rate: 14 BPM  Vt Set: 300 mL  PEEP/CPAP: 5 cmH20  Peak Airway Pressure: 9.5 cmH20  Mean Airway Pressure: 7.9 cmH20  Plateau Pressure: 0 cmH20    Temp  Min: 97.7 °F (36.5 °C)  Max: 98.1 °F (36.7 °C)  Pulse  Min: 85  Max: 115  BP  Min: 120/60  Max: 181/78  MAP (mmHg)  Min: 87  Max:  116  Resp  Min: 10  Max: 30  SpO2  Min: 91 %  Max: 99 %  Oxygen Concentration (%)  Min: 30  Max: 100    06/25 0701 - 06/26 0700  In: 706.6 [P.O.:355]  Out: 3050 [Urine:3050]   Unmeasured Output  Urine Occurrence: 1  Stool Occurrence: 0  Emesis Occurrence: 0  Pad Count: 1        Physical Exam  Vitals and nursing note reviewed.   Constitutional:       Appearance: She is ill-appearing.   HENT:      Head: Normocephalic.      Nose: Nose normal.      Mouth/Throat:      Mouth: Mucous membranes are moist.      Pharynx: Oropharynx is clear.   Eyes:      Pupils: Pupils are equal, round, and reactive to light.   Cardiovascular:      Rate and Rhythm: Normal rate and regular rhythm.      Pulses: Normal pulses.      Heart sounds: Normal heart sounds.   Pulmonary:      Effort: Pulmonary effort is normal.      Breath sounds: Normal breath sounds.   Abdominal:      General: Bowel sounds are normal.      Palpations: Abdomen is soft.   Musculoskeletal:         General: Swelling present.   Skin:     General: Skin is warm and dry.      Capillary Refill: Capillary refill takes 2 to 3 seconds.   Neurological:      General: No focal deficit present.      Comments: Alert, oriented x2, FC in all four extremities. CN intact         Medications:  Continuous Scheduledacetylcysteine 100 mg/ml (10%), 4 mL, Q8H  albuterol-ipratropium, 3 mL, Q8H  ARIPiprazole, 5 mg, Daily  bacitracin zinc, , TID  famotidine, 20 mg, BID  furosemide, 40 mg, BID  levetiracetam, 1,000 mg, Q12H  mupirocin, , Daily  OLANZapine, 5 mg, BID  rifAXIMin, 550 mg, BID  silodosin, 4 mg, Daily  spironolactone, 50 mg, Daily  traZODone, 50 mg, QHS    PRNsodium chloride, , Q24H PRN  sodium chloride, , Q24H PRN  sodium chloride, , Q24H PRN  dextrose 10%, 12.5 g, PRN  dextrose 10%, 25 g, PRN  dextrose, 15 g, PRN  dextrose, 30 g, PRN  magnesium sulfate IVPB, 2 g, PRN  magnesium sulfate IVPB, 4 g, PRN  naloxone, 0.02 mg, PRN  ondansetron, 4 mg, Q8H PRN  oxyCODONE, 5 mg, Q6H  PRN  potassium chloride, 40 mEq, PRN   And  potassium chloride, 60 mEq, PRN   And  potassium chloride, 80 mEq, PRN  racepinephrine, 0.5 mL, Q4H PRN  simethicone, 1 tablet, QID PRN  sodium chloride 0.9%, 10 mL, Q8H PRN  sodium phosphate IVPB, 15 mmol, PRN  sodium phosphate IVPB, 20.01 mmol, PRN  sodium phosphate IVPB, 30 mmol, PRN            Assessment/Plan:     Neuro  Acute encephalopathy  Multifactorial     -delirium precautions    Pulmonary  Acute respiratory insufficiency  In setting of deconditioning, atelecstasis, pleural effusion    -aggressive pulm toilet and oob  -prn nippv  -diuresis    Hematology  Acute deep vein thrombosis (DVT) of brachial vein of right upper extremity  Nonocclusive R brachial vein DVT in area of previous IV site, noted on 6/8  Also R IJ DVT noted on 6/19  Argatroban per Heme recs - transitioned to heparin 6/10  HELD 6/11 in setting of retroperitoneal bleed  IVC filter placed 6/14  Unable to anticoagulate due to continued pancytopenia/thrombocytopenia    GI  Acute liver failure without hepatic coma  Cont spironolactone and lasix  Bili downtrending  INR improving          The patient is being Prophylaxed for:  Venous Thromboembolism with: Mechanical  Stress Ulcer with: Not Applicable   Ventilator Pneumonia with: not applicable    Activity Orders          Diet Dysphagia Mechanical Soft (IDDSI Level 5): Dysphagia 2 (Mechanical Soft Ground) starting at 06/26 1129    Discontinue arterial line starting at 06/18 1927    Elevate HOB flat until bedrest complete starting at 06/11 1641    Turn patient starting at 05/28 2253    Progressive Mobility Protocol (mobilize patient to their highest level of functioning at least twice daily) starting at 05/28 2000        Full Code    Nakul Casey MD  Neurocritical Care  [facility] - Neuro Critical Care

## 2023-06-26 NOTE — NURSING
Attempted to call report for room 83523 three times. Nurse did not answer the phone. Will inform oncoming nurse about transfer orders.

## 2023-06-26 NOTE — SUBJECTIVE & OBJECTIVE
Objective:     Vitals:  Temp: 97.9 °F (36.6 °C)  Pulse: 92  Rhythm: normal sinus rhythm  BP: 120/60  MAP (mmHg): 87  Resp: 12  SpO2: 95 %  Oxygen Concentration (%): 100  Vent Mode: A/C  Set Rate: 14 BPM  Vt Set: 300 mL  PEEP/CPAP: 5 cmH20  Peak Airway Pressure: 9.5 cmH20  Mean Airway Pressure: 7.9 cmH20  Plateau Pressure: 0 cmH20    Temp  Min: 97.7 °F (36.5 °C)  Max: 98.1 °F (36.7 °C)  Pulse  Min: 85  Max: 115  BP  Min: 120/60  Max: 181/78  MAP (mmHg)  Min: 87  Max: 116  Resp  Min: 10  Max: 30  SpO2  Min: 91 %  Max: 99 %  Oxygen Concentration (%)  Min: 30  Max: 100    06/25 0701 - 06/26 0700  In: 706.6 [P.O.:355]  Out: 3050 [Urine:3050]   Unmeasured Output  Urine Occurrence: 1  Stool Occurrence: 0  Emesis Occurrence: 0  Pad Count: 1        Physical Exam  Vitals and nursing note reviewed.   Constitutional:       Appearance: She is ill-appearing.   HENT:      Head: Normocephalic.      Nose: Nose normal.      Mouth/Throat:      Mouth: Mucous membranes are moist.      Pharynx: Oropharynx is clear.   Eyes:      Pupils: Pupils are equal, round, and reactive to light.   Cardiovascular:      Rate and Rhythm: Normal rate and regular rhythm.      Pulses: Normal pulses.      Heart sounds: Normal heart sounds.   Pulmonary:      Effort: Pulmonary effort is normal.      Breath sounds: Normal breath sounds.   Abdominal:      General: Bowel sounds are normal.      Palpations: Abdomen is soft.   Musculoskeletal:         General: Swelling present.   Skin:     General: Skin is warm and dry.      Capillary Refill: Capillary refill takes 2 to 3 seconds.   Neurological:      General: No focal deficit present.      Comments: Alert, oriented x2, FC in all four extremities. CN intact         Medications:  Continuous Scheduledacetylcysteine 100 mg/ml (10%), 4 mL, Q8H  albuterol-ipratropium, 3 mL, Q8H  ARIPiprazole, 5 mg, Daily  bacitracin zinc, , TID  famotidine, 20 mg, BID  furosemide, 40 mg, BID  levetiracetam, 1,000 mg, Q12H  mupirocin,  , Daily  OLANZapine, 5 mg, BID  rifAXIMin, 550 mg, BID  silodosin, 4 mg, Daily  spironolactone, 50 mg, Daily  traZODone, 50 mg, QHS    PRNsodium chloride, , Q24H PRN  sodium chloride, , Q24H PRN  sodium chloride, , Q24H PRN  dextrose 10%, 12.5 g, PRN  dextrose 10%, 25 g, PRN  dextrose, 15 g, PRN  dextrose, 30 g, PRN  magnesium sulfate IVPB, 2 g, PRN  magnesium sulfate IVPB, 4 g, PRN  naloxone, 0.02 mg, PRN  ondansetron, 4 mg, Q8H PRN  oxyCODONE, 5 mg, Q6H PRN  potassium chloride, 40 mEq, PRN   And  potassium chloride, 60 mEq, PRN   And  potassium chloride, 80 mEq, PRN  racepinephrine, 0.5 mL, Q4H PRN  simethicone, 1 tablet, QID PRN  sodium chloride 0.9%, 10 mL, Q8H PRN  sodium phosphate IVPB, 15 mmol, PRN  sodium phosphate IVPB, 20.01 mmol, PRN  sodium phosphate IVPB, 30 mmol, PRN

## 2023-06-26 NOTE — PLAN OF CARE
SW received call from Pt sister regarding the list provided and had some questions. Discussed reasons for starting early looking at SNF options and she reported Pt has been to [facility] SNF/Psych unit before. She reported Pt mental health issues concern her when it comes to sending her to a facility. Currently she is reviewing the list but mostly crossing off options. SW to add [facility] SNF and [facility] Psych unit to referrals while she is continuing to look at options.     Charu Hay LCSW  Neurocritical Care   [facility]  [id number]

## 2023-06-26 NOTE — PT/OT/SLP RE-EVAL
Occupational Therapy   Re-evaluation    Name: Marely Hamilton  MRN: [id number]  Admitting Diagnosis:  Non-convulsive status epilepticus  Recent Surgery: Procedure(s) (LRB):  EMBOLIZATION, BLOOD VESSEL (N/A) 15 Days Post-Op    Recommendations:     Discharge Recommendations: nursing facility, skilled  Discharge Equipment Recommendations: to be determined by next level of care  Barriers to discharge:  Decreased caregiver support, Other (Comment) (increased skilled (A) required)    Assessment:     Marely Hamilton is a 58 y.o. female with a medical diagnosis of Non-convulsive status epilepticus.  She presents with the following performance deficits affecting function are weakness, impaired endurance, impaired self care skills, impaired functional mobility, gait instability, impaired balance, impaired cognition, decreased coordination, decreased upper extremity function, decreased lower extremity function, decreased safety awareness, decreased ROM, impaired coordination, impaired fine motor, impaired skin, edema, impaired cardiopulmonary response to activity, impaired sensation.  Pt agreeable to therapy and tolerated sitting edge of bed for ~5 minutes. Pt with increased agitation as time progressed and requested to return to supine. Pt with c/o of dizziness sitting EOB, vitals remained WNL. Pt remains limited in ADLs, functional mobility, and functional transfers and is currently not performing tasks at PLOF. She is at risk for falls. Pt would continue to benefit from skilled OT services to maximize functional independence with ADLs and functional mobility, reduce caregiver burden, and facilitate safe discharge in the least restrictive environment. OT recommending SNF once medically appropriate for discharge.      Rehab Prognosis:  Good; patient would benefit from acute skilled OT services to address these deficits and reach maximum level of function.       Plan:     Patient to be seen 3 x/week to address the above listed  "problems via self-care/home management, therapeutic activities, therapeutic exercises, neuromuscular re-education  Plan of Care Expires: 07/26/23  Plan of Care Reviewed with: patient    Subjective   "Get me back!"  Chief Complaint: being dizzy sitting EOB  Patient/Family stated goals: to return to bed  Communicated with: RN prior to session.  Pain/Comfort:  Pain Rating 1: 0/10  Pain Rating Post-Intervention 1: 0/10    Objective:     Communicated with: RN prior to session.  Patient found HOB elevated with: blood pressure cuff, peripheral IV, pulse ox (continuous), telemetry, PureWick, pressure relief boots, oxygen upon OT entry to room.    General Precautions: Standard, aspiration, fall  Orthopedic Precautions: N/A  Braces: N/A  Respiratory Status: Nasal cannula, flow 3 L/min    Occupational Performance:    Bed Mobility:    Patient completed Scooting/Bridging with maximal assistance  Patient completed Supine to Sit with maximal assistance  Patient completed Sit to Supine with maximal assistance  Pt tolerated sitting EOB for ~5 minutes with moderate-maximal (A)  Noted posterior lean with pt unable to self correct despite maximal verbal and tactile cueing    Functional Mobility/Transfers:  STS t/f NT for safety    Activities of Daily Living:  Lower Body Dressing: total assistance required to don b/l socks at bed level    Cognitive/Visual Perceptual:  Cognitive/Psychosocial Skills:     -       Oriented to: Person, Place, and Time   -       Follows Commands/attention:Easily distracted and Follows one-step commands  -       Safety awareness/insight to disability: impaired   -       Mood/Affect/Coping skills/emotional control: Flat affect and Agitated    Physical Exam:  Upper Extremity Range of Motion:     -       Right Upper Extremity: WFL except elbow-shoulder limited   -       Left Upper Extremity: WFL except shoulder limited  Upper Extremity Strength:    -       Right Upper Extremity: grossly 2-/5  -       Left Upper " Extremity: grossly 2-/5    AMPAC 6 Click:  [facility] Total Score: 11    Treatment & Education:  -Education on energy conservation and task modification to maximize safety and (I) during ADLs and mobility  -Education on importance of OOB activity to improve overall activity tolerance and promote recovery  -Pt educated to call for assistance and to transfer with hospital staff only  -Provided education regarding role of OT, POC, & discharge recommendations with pt verbalizing understanding.  Pt had no further questions & when asked whether there were any concerns pt reported none.     Patient left HOB elevated with all lines intact, call button in reach, and RN notified    GOALS:   Multidisciplinary Problems       Occupational Therapy Goals          Problem: Occupational Therapy    Goal Priority Disciplines Outcome Interventions   Occupational Therapy Goal     OT, PT/OT Ongoing, Progressing    Description: Goals to be met by: 7/26/23    Patient will increase functional independence with ADLs by performing:    Grooming while EOB with Minimum Assistance.  UE Dressing with Minimum Assistance.  LB Dressing with Moderate Assistance.  Toileting from toilet/bedside commode with Moderate Assistance.   Sitting at edge of bed x10 minutes with Minimal Assistance.  Rolling to Bilateral with Minimal Assistance.   Supine to sit with Moderate Assistance.  Toilet transfer to toilet/bedside commode with Moderate Assistance and LRAD as needed.                         History:     Past Medical History:   Diagnosis Date    Addiction to drug     Alcohol abuse     Alcohol abuse, in remission 6/15/2015    14.5 weeks ago; AA weekly    Anemia     Anxiety 6/15/2015    Behavioral problem     Bipolar disorder     Bipolar disorder in remission 6/15/2015    Cirrhosis, Laennec's 6/15/2015    Depression     Encounter for blood transfusion     Epistaxis 6/15/2015    Fatigue     Glaucoma     Hematuria     Hepatic encephalopathy 6/15/2015    Hepatic  enlargement     History of psychiatric care     History of psychiatric hospitalization     History of seizure 6/15/2015    1    hx of intentional Tylenol overdose 6/15/2015    2005- situational and hx of bipolar    Hx of psychiatric care     Macrocytic anemia 9/18/2015    6 units PRBC since June 2015    Jeana     Osteoarthritis     Other ascites 6/15/2015    Psychiatric exam requested by authority     Psychiatric problem     Psychosis 9/26/2019    Renal disorder     Seizures     Self-harming behavior     Suicide attempt     Therapy     Thrombocytopenia 6/15/2015         Past Surgical History:   Procedure Laterality Date    COSMETIC SURGERY      EMBOLIZATION N/A 6/11/2023    Procedure: EMBOLIZATION, BLOOD VESSEL;  Surgeon: Debbie Surgeon;  Location: [facility];  Service: Anesthesiology;  Laterality: N/A;    ESOPHAGOGASTRODUODENOSCOPY         Time Tracking:     OT Date of Treatment: 06/26/23  OT Start Time: 1312  OT Stop Time: 1324  OT Total Time (min): 12 min    Billable Minutes:Re-eval 12    6/26/2023

## 2023-06-26 NOTE — CONSULTS
[facility] - Neuro Critical Care  Wound Care    Patient Name:  Marely Hamilton   MRN:  [id number]  Date: 6/26/2023  Diagnosis: Non-convulsive status epilepticus    History:     Past Medical History:   Diagnosis Date    Addiction to drug     Alcohol abuse     Alcohol abuse, in remission 6/15/2015    14.5 weeks ago; AA weekly    Anemia     Anxiety 6/15/2015    Behavioral problem     Bipolar disorder     Bipolar disorder in remission 6/15/2015    Cirrhosis, Laennec's 6/15/2015    Depression     Encounter for blood transfusion     Epistaxis 6/15/2015    Fatigue     Glaucoma     Hematuria     Hepatic encephalopathy 6/15/2015    Hepatic enlargement     History of psychiatric care     History of psychiatric hospitalization     History of seizure 6/15/2015    1    hx of intentional Tylenol overdose 6/15/2015    2005- situational and hx of bipolar    Hx of psychiatric care     Macrocytic anemia 9/18/2015    6 units PRBC since June 2015    Jeana     Osteoarthritis     Other ascites 6/15/2015    Psychiatric exam requested by authority     Psychiatric problem     Psychosis 9/26/2019    Renal disorder     Seizures     Self-harming behavior     Suicide attempt     Therapy     Thrombocytopenia 6/15/2015       Social History     Socioeconomic History    Marital status: Single   Occupational History    Occupation: Disabled     Employer: DISABLED   Tobacco Use    Smoking status: Never    Smokeless tobacco: Never   Substance and Sexual Activity    Alcohol use: Not Currently     Comment: hx of ETOH abuse with cirrhosis of liver    Drug use: No    Sexual activity: Not Currently   Other Topics Concern    Patient feels they ought to cut down on drinking/drug use No    Patient annoyed by others criticizing their drinking/drug use No    Patient has felt bad or guilty about drinking/drug use No    Patient has had a drink/used drugs as an eye opener in the AM No   Social History Narrative    Pt has 1 older and 1 younger sister.  Her father  committed suicide when she was 18.  She has a [employer] degree and worked as an , but she has been disabled since age 40.  She never  and has no children.  She is not dating and claims no current friends.  She currently lives with her mother along with her pet dog.  She denies any hobbies and says she is not [employer].     Social Determinants of Health     Financial Resource Strain: Unknown    Difficulty of Paying Living Expenses: Patient refused   Food Insecurity: Unknown    Worried About Running Out of Food in the Last Year: Patient refused    Ran Out of Food in the Last Year: Patient refused   Transportation Needs: Unknown    Lack of Transportation (Medical): Patient refused    Lack of Transportation (Non-Medical): Patient refused   Physical Activity: Unknown    Days of Exercise per Week: Patient refused    Minutes of Exercise per Session: Patient refused   Stress: Unknown    Feeling of Stress : Patient refused   Social Connections: Unknown    Frequency of Communication with Friends and Family: Patient refused    Frequency of Social Gatherings with Friends and Family: Patient refused    Attends Congregational Services: Patient refused    Active Member of Clubs or Organizations: Patient refused    Attends Club or Organization Meetings: Patient refused    Marital Status: Patient refused   Housing Stability: Unknown    Unable to Pay for Housing in the Last Year: Patient refused    Number of Places Lived in the Last Year: 1    Unstable Housing in the Last Year: Patient refused       Precautions:     Allergies as of 05/27/2023 - Reviewed 05/21/2023   Allergen Reaction Noted    Sulfa (sulfonamide antibiotics) Rash 11/26/2014    Codeine Nausea And Vomiting 04/26/2018       [facility] Assessment Details/Treatment       Wound consult received on patient's scalp/forehead. Multiple dry crusted lesions and partial thickness skin loss wounds, appear to be medical adhesive related skin injuries due to EEG leads. Recommend  bacitracin ointment. Updated Amy Naylor NP with [facility], NP approved. Nursing to continue care.

## 2023-06-26 NOTE — PLAN OF CARE
[facility] Care Plan    POC reviewed with Marely Hamilton and family at 1400. Pt unable to verbalize understanding. Questions and concerns addressed. No acute events today. Will continue to monitor. See below and flowsheets for full assessment and VS info.   - Pt on 2 L O2 nc  - PT worked with pt              Is this a stroke patient? no    Neuro:  [place] Coma Scale  Best Eye Response: 4-->(E4) spontaneous  Best Motor Response: 6-->(M6) obeys commands  Best Verbal Response: 4-->(V4) confused  [place]yl Coma Scale Score: 14  Assessment Qualifiers: eye obstruction present  Pupil PERRLA: no     24 hr Temp:  [97.7 °F (36.5 °C)-98.1 °F (36.7 °C)]     CV:   Rhythm: normal sinus rhythm  BP goals:   SBP < 180  MAP > 65    Resp:      Vent Mode: A/C  Set Rate: 14 BPM  Oxygen Concentration (%): 100  Vt Set: 300 mL  PEEP/CPAP: 5 cmH20  Pressure Support: 5 cmH20    Plan: N/A    GI/:     Diet/Nutrition Received: mechanical/dental soft  Last Bowel Movement: 06/21/23  Voiding Characteristics: external catheter    Intake/Output Summary (Last 24 hours) at 6/26/2023 1521  Last data filed at 6/26/2023 1400  Gross per 24 hour   Intake 706.55 ml   Output 3850 ml   Net -3143.45 ml     Unmeasured Output  Urine Occurrence: 1  Stool Occurrence: 0  Emesis Occurrence: 0  Pad Count: 1    Labs/Accuchecks:  Recent Labs   Lab 06/26/23 0031   WBC 4.40   RBC 2.75*   HGB 8.4*   HCT 27.4*   PLT 57*      Recent Labs   Lab 06/26/23  0031 06/26/23  0759   *  --    K 3.5 4.2   CO2 17*  --    *  --    BUN 12  --    CREATININE 0.4*  --    ALKPHOS 120  --    ALT 22  --    AST 35  --    BILITOT 7.8*  --       Recent Labs   Lab 06/21/23  0912 06/22/23  0112 06/26/23  0031   INR  --    < > 1.8*   APTT 33.8*  --   --     < > = values in this interval not displayed.    No results for input(s): CPK, CPKMB, TROPONINI, MB in the last 168 hours.    Electrolytes: Electrolytes replaced  Accuchecks: none    Gtts:      LDA/Wounds:  Lines/Drains/Airways        Drain  Duration             Female External Urinary Catheter 06/22/23 0801 4 days              Peripheral Intravenous Line  Duration                  Peripheral IV - Single Lumen 06/15/23 1229 20 G;1 3/4 in Right Lower leg 11 days         Peripheral IV - Single Lumen 06/21/23 0012 20 G Anterior;Left Upper Arm 5 days         Peripheral IV - Single Lumen 06/25/23 0133 22 G Left;Posterior Forearm 1 day                  Wounds: Yes  Wound care consulted: Yes

## 2023-06-26 NOTE — PT/OT/SLP PROGRESS
Speech Language Pathology Treatment    Patient Name:  Marely Hamilton   MRN:  [id number]  Admitting Diagnosis: Non-convulsive status epilepticus    Recommendations:                 General Recommendations:  Dysphagia therapy and Cognitive-linguistic evaluation  Diet recommendations:  Mechanical soft, Liquid Diet Level: Thin   Aspiration Precautions: 1 bite/sip at a time, Assistance with meals, Eliminate distractions, Feed only when awake/alert, HOB to 90 degrees, Meds crushed in puree, Small bites/sips, and Strict aspiration precautions   General Precautions: Standard, aspiration, fall  Communication strategies:  go to room if call light pushed    Assessment:     Marely Hamilton is a 58 y.o. female with an SLP diagnosis of Dysphagia and Cognitive-Linguistic Impairment.   Subjective     No family present  Nursing reported increased alertness today  Patient goals: did not state     Pain/Comfort:  Pain Rating 1: 0/10  Pain Rating Post-Intervention 1: 0/10    Respiratory Status: nasal cannula    Objective:     Has the patient been evaluated by SLP for swallowing?   Yes  Keep patient NPO? No   Current Respiratory Status:        Pt. Seen at bedside and hob was raised to 90 degree angle.  Pt was alert and verbal with mild dysphonia noted.  Nursing reported min  po intake this am.  Pt. Was observed eating one altagracia cracker and drinking 4 ounces of water via cup with a straw.  Oral and pharyngeal phases of swallow were wfl with adequate mastication and bolus formation.  No coughing, throat clearing or change in vocal quality noted following the swallow.  Ongoing education provided to pt. Re safe swallow strategies and aspiration precautions however reinforcement needed.    Goals:   Multidisciplinary Problems       SLP Goals          Problem: SLP    Goal Priority Disciplines Outcome   SLP Goal     SLP Ongoing, Progressing   Description: Goals due 7/2  1.  Pt. Will participate in ongoing assessment of swallow to initiate least  restrictive diet                   Multidisciplinary Problems (Resolved)          Problem: SLP    Goal Priority Disciplines Outcome   SLP Goal   (Resolved)     SLP Met                       Plan:     Patient to be seen:  4 x/week   Plan of Care expires:  07/24/23  Plan of Care reviewed with:  patient   SLP Follow-Up:  Yes       Discharge recommendations:  other (see comments) (tbd)   Barriers to Discharge:  Decreased Care Giver Support    Time Tracking:     SLP Treatment Date:   06/26/23  Speech Start Time:  1115  Speech Stop Time:  1131     Speech Total Time (min):  16 min    Billable Minutes: Treatment Swallowing Dysfunction 8 and Self Care/Home Management Training 8    06/26/2023   [place] Counseling:  I discussed with the patient the risks of imiquimod including but not limited to erythema, scaling, itching, weeping, crusting, and pain.  Patient understands that the inflammatory response to imiquimod is variable from person to person and was educated regarded proper titration schedule.  If flu-like symptoms develop, patient knows to discontinue the medication and contact us.

## 2023-06-26 NOTE — PLAN OF CARE
Problem: Occupational Therapy  Goal: Occupational Therapy Goal  Description: Goals to be met by: 7/26/23    Patient will increase functional independence with ADLs by performing:    Grooming while EOB with Minimum Assistance.  UE Dressing with Minimum Assistance.  LB Dressing with Moderate Assistance.  Toileting from toilet/bedside commode with Moderate Assistance.   Sitting at edge of bed x10 minutes with Minimal Assistance.  Rolling to Bilateral with Minimal Assistance.   Supine to sit with Moderate Assistance.  Toilet transfer to toilet/bedside commode with Moderate Assistance and LRAD as needed.    Outcome: Ongoing, Progressing   Goals updated.    Anesthesia Post Evaluation    Patient: Colby Yepez    Procedure(s) Performed: Procedure(s) (LRB):  ARTHROSCOPY, SHOULDER, WITH SUBACROMIAL SPACE DECOMPRESSION (Left)  DEBRIDEMENT, ROTATOR CUFF, ARTHROSCOPIC (Left)    Final Anesthesia Type: general    Patient location during evaluation: PACU  Patient participation: Yes- Able to Participate  Level of consciousness: awake and alert and oriented  Post-procedure vital signs: reviewed and stable  Pain management: pain needs to be addressed  Airway patency: patent    PONV status at discharge: No PONV  Anesthetic complications: no      Cardiovascular status: hemodynamically stable  Respiratory status: unassisted, spontaneous ventilation and room air  Hydration status: euvolemic  Follow-up not needed.          Vitals Value Taken Time   /80 12/9/2019 10:37 AM   Temp 36.8 °C (98.2 °F) 12/9/2019 10:05 AM   Pulse 55 12/9/2019 10:38 AM   Resp 16 12/9/2019 10:38 AM   SpO2 96 % 12/9/2019 10:38 AM   Vitals shown include unvalidated device data.      No case tracking events are documented in the log.      Pain/Kevin Score: Pain Rating Prior to Med Admin: 8 (12/9/2019 10:38 AM)  Kevin Score: 4 (12/9/2019 10:15 AM)

## 2023-06-26 NOTE — PLAN OF CARE
Recommend advancing diet to [facility] soft with thin liquids  Problem: SLP  Goal: SLP Goal  Description: Goals due 7/2  1.  Pt. Will participate in ongoing assessment of swallow to initiate least restrictive diet    Outcome: Ongoing, Progressing

## 2023-06-26 NOTE — PLAN OF CARE
[facility] - Neuro Critical Care  Discharge Reassessment    Primary Care Provider: Viktor Ross MD    Expected Discharge Date: 6/27/2023    Reassessment (most recent)       Discharge Reassessment - 06/26/23 1121          Discharge Reassessment    Assessment Type Discharge Planning Reassessment     Did the patient's condition or plan change since previous assessment? No     Discharge Plan discussed with: Sibling     Communicated BRAYAN with patient/caregiver Yes     Discharge Plan A Skilled Nursing Facility     Discharge Plan B Rehab     DME Needed Upon Discharge  none     Transition of Care Barriers None     Why the patient remains in the hospital Requires continued medical care        Post-Acute Status    Post-Acute Authorization Placement     Post-Acute Placement Status Referrals Sent     Discharge Delays None known at this time                   Per MD:06/25/2023 tolerating extubation, bili downtrending, continued resp insufficiency    Patient is not medically ready for discharge.      Danyell Palafox LMSW  PRN - [facility]  [phone]

## 2023-06-26 NOTE — PLAN OF CARE
[facility] Care Plan    POC reviewed with Maerly Hamilton at 0300. Pt verbalized understanding but is confused     - PA notified pt has enteral bolus orders but no ngt tube. Diet started yesterday 6/25. PA notified oral intake is poor and that na was 148. No way to give bolus with ngt .  - CPAP overnight. Pt was restless and did not sleep well.  - K 3.6 replacing IVPB x 40 meq per order.  - Medicated for pain x1 with 2100 meds.  - Bath and complete linen change performed  - Pulmonary hygiene promoted.      Questions and concerns addressed. No acute events overnight. Pt progressing toward goals. Will continue to monitor. See below and flowsheets for full assessment and VS info.           Is this a stroke patient? no    Neuro:  [place] Coma Scale  Best Eye Response: 4-->(E4) spontaneous  Best Motor Response: 6-->(M6) obeys commands  Best Verbal Response: 4-->(V4) confused  [place] Coma Scale Score: 14  Assessment Qualifiers: patient not sedated/intubated  Pupil PERRLA: no     24hr Temp:  [97.5 °F (36.4 °C)-98.5 °F (36.9 °C)]     CV:   Rhythm: normal sinus rhythm  BP goals:   SBP < 180  MAP > 65    Resp:      Vent Mode: Spont  Set Rate: 16 BPM  Oxygen Concentration (%): 30  Vt Set: 440 mL  PEEP/CPAP: 5 cmH20  Pressure Support: 5 cmH20    Plan:  o2 sats>90 - cpap hs    GI/:     Diet/Nutrition Received: NPO  Last Bowel Movement: 06/21/23  Voiding Characteristics: external catheter    Intake/Output Summary (Last 24 hours) at 6/26/2023 0445  Last data filed at 6/26/2023 0305  Gross per 24 hour   Intake 608.02 ml   Output 3050 ml   Net -2441.98 ml     Unmeasured Output  Urine Occurrence: 1  Stool Occurrence: 0  Emesis Occurrence: 0  Pad Count: 2    Labs/Accuchecks:  Recent Labs   Lab 06/26/23  0031   WBC 4.40   RBC 2.75*   HGB 8.4*   HCT 27.4*   PLT 57*      Recent Labs   Lab 06/26/23  0031   *   K 3.5   CO2 17*   *   BUN 12   CREATININE 0.4*   ALKPHOS 120   ALT 22   AST 35   BILITOT 7.8*      Recent Labs   Lab  06/21/23  0912 06/22/23  0112 06/26/23  0031   INR  --    < > 1.8*   APTT 33.8*  --   --     < > = values in this interval not displayed.    No results for input(s): CPK, CPKMB, TROPONINI, MB in the last 168 hours.    Electrolytes: Electrolytes replaced  Accuchecks: none    Gtts:      LDA/Wounds:  Lines/Drains/Airways       Drain  Duration             Female External Urinary Catheter 06/22/23 0801 3 days              Peripheral Intravenous Line  Duration                  Peripheral IV - Single Lumen 06/15/23 1229 20 G;1 3/4 in Right Lower leg 10 days         Peripheral IV - Single Lumen 06/21/23 0012 20 G Anterior;Left Upper Arm 5 days         Peripheral IV - Single Lumen 06/25/23 0133 22 G Left;Posterior Forearm 1 day                  Wounds: Yes  Wound care consulted: Yes

## 2023-06-27 LAB
ALBUMIN SERPL BCP-MCNC: 2.8 G/DL (ref 3.5–5.2)
ALP SERPL-CCNC: 109 U/L (ref 55–135)
ALT SERPL W/O P-5'-P-CCNC: 20 U/L (ref 10–44)
AMMONIA PLAS-SCNC: 39 UMOL/L (ref 10–50)
ANION GAP SERPL CALC-SCNC: 9 MMOL/L (ref 8–16)
ANISOCYTOSIS BLD QL SMEAR: ABNORMAL
AST SERPL-CCNC: 34 U/L (ref 10–40)
BASOPHILS # BLD AUTO: 0.01 K/UL (ref 0–0.2)
BASOPHILS NFR BLD: 0.3 % (ref 0–1.9)
BILIRUB SERPL-MCNC: 7.5 MG/DL (ref 0.1–1)
BUN SERPL-MCNC: 8 MG/DL (ref 6–20)
BURR CELLS BLD QL SMEAR: ABNORMAL
CALCIUM SERPL-MCNC: 8.5 MG/DL (ref 8.7–10.5)
CHLORIDE SERPL-SCNC: 117 MMOL/L (ref 95–110)
CO2 SERPL-SCNC: 21 MMOL/L (ref 23–29)
CREAT SERPL-MCNC: 0.4 MG/DL (ref 0.5–1.4)
DIFFERENTIAL METHOD: ABNORMAL
EOSINOPHIL # BLD AUTO: 0.1 K/UL (ref 0–0.5)
EOSINOPHIL NFR BLD: 1.7 % (ref 0–8)
ERYTHROCYTE [DISTWIDTH] IN BLOOD BY AUTOMATED COUNT: 28 % (ref 11.5–14.5)
EST. GFR  (NO RACE VARIABLE): >60 ML/MIN/1.73 M^2
GLUCOSE SERPL-MCNC: 125 MG/DL (ref 70–110)
HCT VFR BLD AUTO: 26.3 % (ref 37–48.5)
HGB BLD-MCNC: 8.2 G/DL (ref 12–16)
HYPOCHROMIA BLD QL SMEAR: ABNORMAL
IMM GRANULOCYTES # BLD AUTO: 0.01 K/UL (ref 0–0.04)
IMM GRANULOCYTES NFR BLD AUTO: 0.3 % (ref 0–0.5)
INR PPP: 1.9 (ref 0.8–1.2)
LYMPHOCYTES # BLD AUTO: 0.6 K/UL (ref 1–4.8)
LYMPHOCYTES NFR BLD: 21.7 % (ref 18–48)
MAGNESIUM SERPL-MCNC: 1.7 MG/DL (ref 1.6–2.6)
MCH RBC QN AUTO: 30.9 PG (ref 27–31)
MCHC RBC AUTO-ENTMCNC: 31.2 G/DL (ref 32–36)
MCV RBC AUTO: 99 FL (ref 82–98)
MONOCYTES # BLD AUTO: 0.4 K/UL (ref 0.3–1)
MONOCYTES NFR BLD: 12.5 % (ref 4–15)
NEUTROPHILS # BLD AUTO: 1.9 K/UL (ref 1.8–7.7)
NEUTROPHILS NFR BLD: 63.5 % (ref 38–73)
NRBC BLD-RTO: 0 /100 WBC
OVALOCYTES BLD QL SMEAR: ABNORMAL
PHOSPHATE SERPL-MCNC: 2.7 MG/DL (ref 2.7–4.5)
PLATELET # BLD AUTO: 44 K/UL (ref 150–450)
PLATELET BLD QL SMEAR: ABNORMAL
PMV BLD AUTO: 10.1 FL (ref 9.2–12.9)
POIKILOCYTOSIS BLD QL SMEAR: SLIGHT
POLYCHROMASIA BLD QL SMEAR: ABNORMAL
POTASSIUM SERPL-SCNC: 3.5 MMOL/L (ref 3.5–5.1)
PROT SERPL-MCNC: 5.6 G/DL (ref 6–8.4)
PROTHROMBIN TIME: 19.4 SEC (ref 9–12.5)
RBC # BLD AUTO: 2.65 M/UL (ref 4–5.4)
SCHISTOCYTES BLD QL SMEAR: ABNORMAL
SODIUM SERPL-SCNC: 147 MMOL/L (ref 136–145)
SPHEROCYTES BLD QL SMEAR: ABNORMAL
WBC # BLD AUTO: 2.95 K/UL (ref 3.9–12.7)

## 2023-06-27 PROCEDURE — 93005 ELECTROCARDIOGRAM TRACING: CPT

## 2023-06-27 PROCEDURE — 25000003 PHARM REV CODE 250: Performed by: NURSE PRACTITIONER

## 2023-06-27 PROCEDURE — 93010 EKG 12-LEAD: ICD-10-PCS | Mod: ,,, | Performed by: INTERNAL MEDICINE

## 2023-06-27 PROCEDURE — 97530 THERAPEUTIC ACTIVITIES: CPT

## 2023-06-27 PROCEDURE — 99233 SBSQ HOSP IP/OBS HIGH 50: CPT | Mod: ,,, | Performed by: PHYSICIAN ASSISTANT

## 2023-06-27 PROCEDURE — 25000003 PHARM REV CODE 250

## 2023-06-27 PROCEDURE — 94660 CPAP INITIATION&MGMT: CPT

## 2023-06-27 PROCEDURE — 80053 COMPREHEN METABOLIC PANEL: CPT

## 2023-06-27 PROCEDURE — 99900035 HC TECH TIME PER 15 MIN (STAT)

## 2023-06-27 PROCEDURE — 99233 PR SUBSEQUENT HOSPITAL CARE,LEVL III: ICD-10-PCS | Mod: ,,, | Performed by: PHYSICIAN ASSISTANT

## 2023-06-27 PROCEDURE — 25000003 PHARM REV CODE 250: Performed by: PHYSICIAN ASSISTANT

## 2023-06-27 PROCEDURE — 92523 SPEECH SOUND LANG COMPREHEN: CPT

## 2023-06-27 PROCEDURE — 27000221 HC OXYGEN, UP TO 24 HOURS

## 2023-06-27 PROCEDURE — 82140 ASSAY OF AMMONIA: CPT | Performed by: PHYSICIAN ASSISTANT

## 2023-06-27 PROCEDURE — 85025 COMPLETE CBC W/AUTO DIFF WBC: CPT

## 2023-06-27 PROCEDURE — 94668 MNPJ CHEST WALL SBSQ: CPT

## 2023-06-27 PROCEDURE — 94761 N-INVAS EAR/PLS OXIMETRY MLT: CPT

## 2023-06-27 PROCEDURE — 20600001 HC STEP DOWN PRIVATE ROOM

## 2023-06-27 PROCEDURE — 94640 AIRWAY INHALATION TREATMENT: CPT

## 2023-06-27 PROCEDURE — 63600175 PHARM REV CODE 636 W HCPCS

## 2023-06-27 PROCEDURE — 25000003 PHARM REV CODE 250: Performed by: PSYCHIATRY & NEUROLOGY

## 2023-06-27 PROCEDURE — 83735 ASSAY OF MAGNESIUM: CPT

## 2023-06-27 PROCEDURE — 97535 SELF CARE MNGMENT TRAINING: CPT

## 2023-06-27 PROCEDURE — 25000242 PHARM REV CODE 250 ALT 637 W/ HCPCS: Performed by: PSYCHIATRY & NEUROLOGY

## 2023-06-27 PROCEDURE — 93010 ELECTROCARDIOGRAM REPORT: CPT | Mod: ,,, | Performed by: INTERNAL MEDICINE

## 2023-06-27 PROCEDURE — 85610 PROTHROMBIN TIME: CPT | Performed by: NURSE PRACTITIONER

## 2023-06-27 PROCEDURE — 84100 ASSAY OF PHOSPHORUS: CPT

## 2023-06-27 RX ORDER — LACTULOSE 10 G/15ML
15 SOLUTION ORAL 3 TIMES DAILY
Status: DISCONTINUED | OUTPATIENT
Start: 2023-06-27 | End: 2023-06-30 | Stop reason: HOSPADM

## 2023-06-27 RX ORDER — IPRATROPIUM BROMIDE AND ALBUTEROL SULFATE 2.5; .5 MG/3ML; MG/3ML
3 SOLUTION RESPIRATORY (INHALATION) EVERY 6 HOURS PRN
Status: DISCONTINUED | OUTPATIENT
Start: 2023-06-27 | End: 2023-06-30 | Stop reason: HOSPADM

## 2023-06-27 RX ORDER — NICARDIPINE HYDROCHLORIDE 0.2 MG/ML
INJECTION INTRAVENOUS
Status: DISPENSED
Start: 2023-06-27 | End: 2023-06-27

## 2023-06-27 RX ORDER — OLANZAPINE 5 MG/1
15 TABLET ORAL NIGHTLY
Status: DISCONTINUED | OUTPATIENT
Start: 2023-06-28 | End: 2023-06-30

## 2023-06-27 RX ORDER — HYDROXYZINE HYDROCHLORIDE 25 MG/1
50 TABLET, FILM COATED ORAL NIGHTLY PRN
Status: DISCONTINUED | OUTPATIENT
Start: 2023-06-27 | End: 2023-06-30 | Stop reason: HOSPADM

## 2023-06-27 RX ADMIN — BACITRACIN 1 EACH: 500 OINTMENT TOPICAL at 09:06

## 2023-06-27 RX ADMIN — BACITRACIN: 500 OINTMENT TOPICAL at 03:06

## 2023-06-27 RX ADMIN — ACETYLCYSTEINE 4 ML: 100 INHALANT RESPIRATORY (INHALATION) at 08:06

## 2023-06-27 RX ADMIN — SPIRONOLACTONE 50 MG: 25 TABLET, FILM COATED ORAL at 09:06

## 2023-06-27 RX ADMIN — LEVETIRACETAM 1000 MG: 500 SOLUTION ORAL at 09:06

## 2023-06-27 RX ADMIN — FUROSEMIDE 40 MG: 40 TABLET ORAL at 09:06

## 2023-06-27 RX ADMIN — LACTULOSE 15 G: 20 SOLUTION ORAL at 12:06

## 2023-06-27 RX ADMIN — OLANZAPINE 5 MG: 2.5 TABLET, FILM COATED ORAL at 09:06

## 2023-06-27 RX ADMIN — OXYCODONE HYDROCHLORIDE 5 MG: 5 TABLET ORAL at 12:06

## 2023-06-27 RX ADMIN — RIFAXIMIN 550 MG: 550 TABLET ORAL at 09:06

## 2023-06-27 RX ADMIN — POTASSIUM CHLORIDE 10 MEQ: 7.46 INJECTION, SOLUTION INTRAVENOUS at 06:06

## 2023-06-27 RX ADMIN — FAMOTIDINE 20 MG: 20 TABLET, FILM COATED ORAL at 09:06

## 2023-06-27 RX ADMIN — SILODOSIN 4 MG: 4 CAPSULE ORAL at 09:06

## 2023-06-27 RX ADMIN — LACTULOSE 15 G: 20 SOLUTION ORAL at 09:06

## 2023-06-27 RX ADMIN — IPRATROPIUM BROMIDE AND ALBUTEROL SULFATE 3 ML: .5; 3 SOLUTION RESPIRATORY (INHALATION) at 08:06

## 2023-06-27 RX ADMIN — MAGNESIUM SULFATE 2 G: 2 INJECTION INTRAVENOUS at 01:06

## 2023-06-27 RX ADMIN — OXYCODONE HYDROCHLORIDE 5 MG: 5 TABLET ORAL at 09:06

## 2023-06-27 RX ADMIN — POTASSIUM CHLORIDE 10 MEQ: 7.46 INJECTION, SOLUTION INTRAVENOUS at 04:06

## 2023-06-27 RX ADMIN — POTASSIUM CHLORIDE 10 MEQ: 7.46 INJECTION, SOLUTION INTRAVENOUS at 02:06

## 2023-06-27 RX ADMIN — FUROSEMIDE 40 MG: 40 TABLET ORAL at 06:06

## 2023-06-27 RX ADMIN — MUPIROCIN: 20 OINTMENT TOPICAL at 09:06

## 2023-06-27 RX ADMIN — LACTULOSE 15 G: 20 SOLUTION ORAL at 03:06

## 2023-06-27 RX ADMIN — ARIPIPRAZOLE 5 MG: 5 TABLET ORAL at 09:06

## 2023-06-27 RX ADMIN — POTASSIUM CHLORIDE 10 MEQ: 7.46 INJECTION, SOLUTION INTRAVENOUS at 03:06

## 2023-06-27 RX ADMIN — TRAZODONE HYDROCHLORIDE 50 MG: 50 TABLET ORAL at 09:06

## 2023-06-27 NOTE — PLAN OF CARE
[facility] Care Plan    POC reviewed with Marely Hamilton and family at 1400. Pt verbalized understanding. Questions and concerns addressed. No acute events today. Pt progressing toward goals. Will continue to monitor. See below and flowsheets for full assessment and VS info.     Replaced potassium  Replaced magnesium  Ammonia lab =39  Restarted lactulose  D/C Aripiprazole  Modified olanzapine order  Transfer orders in for hospital medicine         Is this a stroke patient? no    Neuro:  [place] Coma Scale  Best Eye Response: 4-->(E4) spontaneous  Best Motor Response: 6-->(M6) obeys commands  Best Verbal Response: 4-->(V4) confused  Cheryl Coma Scale Score: 14  Assessment Qualifiers: patient not sedated/intubated, no eye obstruction present  Pupil PERRLA: no     24 hr Temp:  [97.5 °F (36.4 °C)-98 °F (36.7 °C)]     CV:   Rhythm: normal sinus rhythm, sinus tachycardia  BP goals:   SBP < 160  MAP > 65    Resp:      Vent Mode: A/C  Set Rate: 14 BPM  Oxygen Concentration (%): 30  Vt Set: 300 mL  PEEP/CPAP: 5 cmH20  Pressure Support: 5 cmH20    Plan: N/A    GI/:     Diet/Nutrition Received: mechanical/dental soft  Last Bowel Movement: 06/21/23  Voiding Characteristics: incontinence    Intake/Output Summary (Last 24 hours) at 6/27/2023 1405  Last data filed at 6/27/2023 1301  Gross per 24 hour   Intake 1705.26 ml   Output 2900 ml   Net -1194.74 ml     Unmeasured Output  Urine Occurrence: 1  Stool Occurrence: 0  Emesis Occurrence: 0  Pad Count: 1    Labs/Accuchecks:  Recent Labs   Lab 06/27/23  0027   WBC 2.95*   RBC 2.65*   HGB 8.2*   HCT 26.3*   PLT 44*      Recent Labs   Lab 06/27/23  0027   *   K 3.5   CO2 21*   *   BUN 8   CREATININE 0.4*   ALKPHOS 109   ALT 20   AST 34   BILITOT 7.5*      Recent Labs   Lab 06/21/23  0912 06/22/23  0112 06/27/23  0027   INR  --    < > 1.9*   APTT 33.8*  --   --     < > = values in this interval not displayed.    No results for input(s): CPK, CPKMB, TROPONINI, MB in the last  168 hours.    Electrolytes: Electrolytes replaced  Accuchecks: none    Gtts:      LDA/Wounds:  Lines/Drains/Airways       Drain  Duration             Female External Urinary Catheter 06/22/23 0801 5 days              Peripheral Intravenous Line  Duration                  Peripheral IV - Single Lumen 06/15/23 1229 20 G;1 3/4 in Right Lower leg 12 days         Peripheral IV - Single Lumen 06/21/23 0012 20 G Anterior;Left Upper Arm 6 days                  Wounds: Yes  Wound care consulted: Yes

## 2023-06-27 NOTE — ASSESSMENT & PLAN NOTE
58F with EtOH induced hepatic cirrhosis, seizure disorder on outpatient LEV and ZNS and bipolar disorder admitted on 5/28 for persistent encephalopathy.    - UTI on admit (Proteus mirabilis), now s/p CTX course  - Hypercalcemia 2/2 lithium toxicity (Lithium changed to Abilify per Psych)  - Hepatic encephalopathy 2/2 to medication non compliance, treated with lactulose and rifaximin     MRI Brain WO was unremarkable for acute neurologic change  EEG w/ frequent left hemispheric electrographic seizures and NCSE  Repeat EEGs without seizure activity x 24 hours, now resolved    6/11 --> acute hypotension, hypoxia --> hypovolemic shock --> Large R peritoneal hematoma --> IR for class A embo --> s/p IR embolization of left lumbar and internal iliac branch foci of arterial extravasation  6/13 --> episode of hypoxia and SOB --> CTA PE without evidence of PE, CT C/A/P without new bleed    - Continued admission to [facility]  - Q4h neurochecks while in ICU  - Q1h vitals while in ICU  - HOB@30  - Keppra 500mg BID  - Thiamine replacement   - Lactulose, titrate to BM  - MAP>65  - Daily CMP, Mag, Phos - replete electrolytes PRN  - Lipid panel, A1c, Coags reviewed  - Daily CBC, transfuse PRN and replace Plts <25  - Daily fibrinogen, PT/INR  - Heparin held in setting of acute bleed/DIC, IVC filter in place  - Keppra 1g BID, cEEG without captured seizure 6/22   - PT/OT/SLP as appropriate  - CM/SW consult for dispo planning

## 2023-06-27 NOTE — PT/OT/SLP PROGRESS
"Physical Therapy Treatment    Patient Name:  Marely Hamilton   MRN:  [id number]  Rehab technician utilized to assist w/ treatment session 2/2 pt requiring two person assist for functional mobility   Recommendations:     Discharge Recommendations: nursing facility, skilled  Discharge Equipment Recommendations: to be determined by next level of care  Barriers to discharge:  inc level of assist needed    Assessment:     Marely Hamilton is a 58 y.o. female admitted with a medical diagnosis of Non-convulsive status epilepticus.  She presents with the following impairments/functional limitations: weakness, impaired endurance, impaired self care skills, impaired functional mobility, gait instability, impaired balance, impaired cognition, decreased coordination, decreased upper extremity function, decreased lower extremity function, decreased safety awareness, impaired cardiopulmonary response to activity. Pt required maxA x2 for bed mobility, and CGA for EOB balance w/ LUE support. Attempted to stand w/ total Ax2 w/ pt not attempting to assist or press through BLE, reporting "I can't stand because I lost my Rolex". Rec d/c to SNF for continued treatment as pt motivated to work w/ therapy and w/ significant decline in functional mobility from baseline, but unable to tolerate the 3hrs/day of therapy required for IPR.     Rehab Prognosis: Good; patient would benefit from acute skilled PT services to address these deficits and reach maximum level of function.    Recent Surgery: Procedure(s) (LRB):  EMBOLIZATION, BLOOD VESSEL (N/A) 16 Days Post-Op    Plan:     During this hospitalization, patient to be seen 3 x/week to address the identified rehab impairments via gait training, therapeutic activities, therapeutic exercises, neuromuscular re-education and progress toward the following goals:    Plan of Care Expires:  07/25/23    Subjective     Chief Complaint: none  Patient/Family Comments/goals: "today is the best day of my " "life"  Pain/Comfort:  Pain Rating 1: 0/10  Pain Rating Post-Intervention 1: 0/10      Objective:     Communicated with Rn prior to session.  Patient found HOB elevated with blood pressure cuff, peripheral IV, pulse ox (continuous), telemetry, PureWick, oxygen upon PT entry to room.     General Precautions: Standard, aspiration, fall  Orthopedic Precautions: N/A  Braces: N/A  Respiratory Status: Nasal cannula, flow 3 L/min     Functional Mobility:  Bed Mobility:     Scooting: total assistance and of 2 persons  Supine to Sit: maximal assistance and of 2 persons  Sit to Supine: maximal assistance and of 2 persons  Transfers:     Sit to Stand:  total assistance and of 2 persons with hand-held assist w/ inability to achieve full stand 2/2 pt not participating  Balance: EOB sitting initially max A progressing to CGA w/ LUE support      AM-PAC 6 CLICK MOBILITY  Turning over in bed (including adjusting bedclothes, sheets and blankets)?: 2  Sitting down on and standing up from a chair with arms (e.g., wheelchair, bedside commode, etc.): 1  Moving from lying on back to sitting on the side of the bed?: 2  Moving to and from a bed to a chair (including a wheelchair)?: 1  Need to walk in hospital room?: 1  Climbing 3-5 steps with a railing?: 1  Basic Mobility Total Score: 8       Treatment & Education:  Pt educated on PT POC/goals, d/c recs, and continued treatment. All questions answered and pt in agreement w/ POC.   Sitting TE: BLE LAQ and ankle pumps  Sitting balance w/ cueing for hand placement, upright posture, safety awareness and command following    Patient left HOB elevated with all lines intact, call button in reach, bed alarm on, restraints reapplied at end of session, and RN present..    GOALS:   Multidisciplinary Problems       Physical Therapy Goals          Problem: Physical Therapy    Goal Priority Disciplines Outcome Goal Variances Interventions   Physical Therapy Goal     PT, PT/OT Ongoing, Progressing   "   Description: Goals to be met by: 2023     Patient will increase functional independence with mobility by performin. Supine to sit with moderate assistance  2. Sit to supine with moderate assistance  3. Sit to stand transfer with moderate assistance  4. Bed to chair transfer with maximal assistance using LRAD as needed  5. Gait  x 5 feet with maximal assistance using LRAD as needed  6. Sitting at edge of bed x8 minutes with Stand-by Assistance  7. Lower extremity exercise program x10 reps per handout, with independence                        Time Tracking:     PT Received On: 23  PT Start Time: 1041     PT Stop Time: 1059  PT Total Time (min): 18 min     Billable Minutes: Therapeutic Activity 18    Treatment Type: Treatment  PT/PTA: PT     Number of PTA visits since last PT visit: 0     2023

## 2023-06-27 NOTE — PT/OT/SLP EVAL
Speech Language Pathology Evaluation  Cognitive Communication    Patient Name:  Marely Hamilton   MRN:  [id number]  Admitting Diagnosis: Non-convulsive status epilepticus    Recommendations:     Recommendations:                General Recommendations:  Dysphagia therapy and Cognitive-linguistic therapy  Diet recommendations:  Mechanical soft, Thin   Aspiration Precautions: Assistance with meals, HOB to 90 degrees, Meds whole 1 at a time, Small bites/sips, and Standard aspiration precautions   General Precautions: Standard, fall, aspiration  Communication strategies:  go to room if call light pushed    Assessment:     Marely Hamilton is a 58 y.o. female with an SLP diagnosis of Dysphagia and Cognitive-Linguistic Impairment.  .    History:     Past Medical History:   Diagnosis Date    Addiction to drug     Alcohol abuse     Alcohol abuse, in remission 6/15/2015    14.5 weeks ago; AA weekly    Anemia     Anxiety 6/15/2015    Behavioral problem     Bipolar disorder     Bipolar disorder in remission 6/15/2015    Cirrhosis, Laennec's 6/15/2015    Depression     Encounter for blood transfusion     Epistaxis 6/15/2015    Fatigue     Glaucoma     Hematuria     Hepatic encephalopathy 6/15/2015    Hepatic enlargement     History of psychiatric care     History of psychiatric hospitalization     History of seizure 6/15/2015    1    hx of intentional Tylenol overdose 6/15/2015    2005- situational and hx of bipolar    Hx of psychiatric care     Macrocytic anemia 9/18/2015    6 units PRBC since June 2015    Jeana     Osteoarthritis     Other ascites 6/15/2015    Psychiatric exam requested by authority     Psychiatric problem     Psychosis 9/26/2019    Renal disorder     Seizures     Self-harming behavior     Suicide attempt     Therapy     Thrombocytopenia 6/15/2015       Past Surgical History:   Procedure Laterality Date    COSMETIC SURGERY      EMBOLIZATION N/A 6/11/2023    Procedure: EMBOLIZATION, BLOOD VESSEL;  Surgeon: Debbie  Surgeon;  Location: [facility];  Service: Anesthesiology;  Laterality: N/A;    ESOPHAGOGASTRODUODENOSCOPY         Social History: Patient lives with self.        Prior diet: regular with thin.        Subjective     No family present  Patient goals: did not state     Pain/Comfort:  Pain Rating 1: 0/10  Pain Rating Post-Intervention 1: 0/10    Respiratory Status: Room air    Objective:     Cognitive Status:    Pt. was oriented to year and place only with poor recall of recent and good recall of remote temporal and general information.  Immediate/delayed verbal recall was wfl with pt. Repeating 7 digits and 5 words without difficulty.  Responses to hypothetical verbal problem solving tasks were accurate and complete for simple .  Pt. Compared and but did not contrast objects and generated 1/3 solutions to problems.  Thought organization and categorization skills were impaired with pt. Naming 3 animals given one minute when 15-20 are wnl.  Off task responses noted with redirection to task and repetition needed.    Reptive Language:   Comprehension:   Pt. Followed simple commands with 100% accuracy and responded to complex yes/no questions with 90% accuracy.     Pragmatics:    Poor insight  Poor eye contact  Off task responses    Expressive Language:  Verbal:    Pt. Recalled common nouns in response to questions and named common objects with 100% accuracy          Motor Speech:  wfl    Voice:   Mild dysphonia noted    Visual-Spatial:  tba    Reading:   tba     Written Expression:   tba    Treatment: education provided re role of slp and plan of care as well as safe swallow strategies and aspiration precautions    Goals:   Multidisciplinary Problems       SLP Goals          Problem: SLP    Goal Priority Disciplines Outcome   SLP Goal     SLP Ongoing, Progressing   Description: Goals due 7/5  1.  Pt. Will participate in ongoing assessment of swallow to initiate least restrictive diet    2.  Assess functional reading and  writing skills  3.  [place] to month, year and place  4.  Respond to verbal problem solving tasks with 80% accuracy with min cues  5.  REspond to categorization /organization tasks with 80% accuracy with mod cues                   Multidisciplinary Problems (Resolved)          Problem: SLP    Goal Priority Disciplines Outcome   SLP Goal   (Resolved)     SLP Met                       Plan:   Patient to be seen:  4 x/week   Plan of Care expires:  07/24/23  Plan of Care reviewed with:  patient   SLP Follow-Up:  Yes       Discharge recommendations:  Discharge Facility/Level of Care Needs: rehabilitation facility   Barriers to Discharge:  Decreased Care Giver Support    Time Tracking:     SLP Treatment Date:   09/15/23  Speech Start Time:  0915  Speech Stop Time:  0935     Speech Total Time (min):  20 min    Billable Minutes: Eval 12  and Self Care/Home Management Training 8    06/27/2023

## 2023-06-27 NOTE — PLAN OF CARE
Problem: SLP  Goal: SLP Goal  Description: Goals due 7/5  1.  Pt. Will participate in ongoing assessment of swallow to initiate least restrictive diet    2.  Assess functional reading and writing skills  3.  [place] to month, year and place  4.  Respond to verbal problem solving tasks with 80% accuracy with min cues  5.  REspond to categorization /organization tasks with 80% accuracy with mod cues    Outcome: Ongoing, Progressing

## 2023-06-27 NOTE — PROVIDER TRANSFER
Neuro Critical Care Transfer of Care note    Date of Admit: 5/28/2023  Date of Transfer / Stepdown: 6/27/2023    Brief History of Present Illness:      Marely Hamilton is a 58 y.o. 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/2015), Anemia, Anxiety (6/15/2015), Behavioral problem, Bipolar disorder, Bipolar disorder in remission (6/15/2015), Cirrhosis, Laennec's (6/15/2015), Depression, Encounter for blood transfusion, Epistaxis (6/15/2015), Fatigue, Glaucoma, Hematuria, Hepatic encephalopathy (6/15/2015), Hepatic enlargement, History of psychiatric care, History of psychiatric hospitalization, History of seizure (6/15/2015), hx of intentional Tylenol overdose (6/15/2015), psychiatric care, Macrocytic anemia (9/18/2015), Jeana, Osteoarthritis, Other ascites (6/15/2015), Psychiatric exam requested by authority, Psychiatric problem, Psychosis (9/26/2019), Renal disorder, Seizures, Self-harming behavior, Suicide attempt, Therapy, and Thrombocytopenia (6/15/2015).. The patient presented to [facility] on 5/28/2023 with a primary complaint of No chief complaint on file.          Hospital Course By Problem with Pertinent Findings:     Neuro  * Non-convulsive status epilepticus  58F with EtOH induced hepatic cirrhosis, seizure disorder on outpatient LEV and ZNS and bipolar disorder admitted on 5/28 for persistent encephalopathy.     - UTI on admit (Proteus mirabilis), now s/p CTX course  - Hypercalcemia 2/2 lithium toxicity (Lithium changed to Abilify per Psych)  - Hepatic encephalopathy 2/2 to medication non compliance, treated with lactulose and rifaximin      MRI Brain WO was unremarkable for acute neurologic change  EEG w/ frequent left hemispheric electrographic seizures and NCSE  Repeat EEGs without seizure activity x 24 hours, now resolved     6/11 --> acute hypotension, hypoxia --> hypovolemic shock --> Large R peritoneal hematoma --> IR for  class A embo --> s/p IR embolization of left lumbar and internal iliac branch foci of arterial extravasation  6/13 --> episode of hypoxia and SOB --> CTA PE without evidence of PE, CT C/A/P without new bleed     - Continued admission to [facility]  - Q4h neurochecks while in ICU  - Q1h vitals while in ICU  - HOB@30  - Keppra 500mg BID  - Thiamine replacement   - Lactulose, titrate to BM  - MAP>65  - Daily CMP, Mag, Phos - replete electrolytes PRN  - Lipid panel, A1c, Coags reviewed  - Daily CBC, transfuse PRN and replace Plts <25  - Daily fibrinogen, PT/INR  - Heparin held in setting of acute bleed/DIC, IVC filter in place  - Keppra 1g BID, cEEG without captured seizure 6/22   - PT/OT/SLP as appropriate  - CM/SW consult for dispo planning     Acute encephalopathy  Multifactorial      -delirium precautions  -ammonia pending  -resume lactulose tid     Psychiatric  Bipolar 1 disorder  Increase olanzapine and just treat with monotherapy        Hematology  Acute deep vein thrombosis (DVT) of brachial vein of right upper extremity  Nonocclusive R brachial vein DVT in area of previous IV site, noted on 6/8  Also R IJ DVT noted on 6/19  Argatroban per Heme recs - transitioned to heparin 6/10  HELD 6/11 in setting of retroperitoneal bleed  IVC filter placed 6/14  Unable to anticoagulate due to continued pancytopenia/thrombocytopenia     Thrombocytopenia  Likely secondary to hepatic cirrhosis and development of splenomegaly  Hematology consulted, tested negative for HIT     6/11 --> acute hypotension, hypoxia --> hypovolemic shock --> Large R peritoneal hematoma --> IR for class A embo --> s/p IR embolization of left lumbar and internal iliac branch foci of arterial extravasation     Plts continue to trending down despite transfusion, however, no active bleeding. Will transfuse if plt < 25, or < 50 with active bleeding     Endocrine  Hyperammonemia  Ammonia pending  Resume lactulose         GI  Acute liver failure without hepatic  coma  Cont spironolactone and lasix  Bili downtrending  INR improving     Hepatic cirrhosis  Ammonia normalized   lfts unremarkable  Bili downtrending  Coags/inr mildly abnormal  Continued encephalopathy      Cont rifaximin  Spironolactone, monitor K level, replaced prn    Consultants and Procedures:     Consultants:  GI  Heme onc  Epilepsy  Wound care  Psych    Procedures:    EEG complete    Transfer Information:     Diet:  Mech soft    Physical Activity:  As tolerated     To Do / Pending Studies / Follow ups:      Marietta Esteban  Neuro Crtical Care

## 2023-06-27 NOTE — NURSING
"Upon arriving to POD for shift change/report, patient ripped off nasal cannula. Patient is satting in the mid 80s. RN entered the room to introduce self to patient. Patient is alert and knows that she is in the hospital ([facility]), thinks that she is here due to bipolar episodes, and thinks the month is March (knows that it is 2023). RN leaves the room after orientating the patient to her surroundings. While finishing up report, patient proceeds to rip out the 22 gauge PIV in her left forearm. The patient is unable to sit still. RN held pressure on the access site and asked if the patient is okay and what she is feeling. Patient states that she feels stuck and extremely anxious and is getting her days mixed up especially after seeing an unfamiliar face. Patient then requests something for anxiety "xanax" to help her nerves. Will continue to work with patient to make her feel more comfortable in her surroundings.   "

## 2023-06-27 NOTE — PLAN OF CARE
[facility] Care Plan    POC reviewed with Marely Hamilton and family at 0300. Pt verbalized understanding. Questions and concerns addressed. No acute events overnight. Pt progressing toward goals. Will continue to monitor. See below and flowsheets for full assessment and VS info.     Mag and potassium replaced  Oxy given x1 for pain relief  Wound care consulted for new skin tear  No signs of seizure activity       Is this a stroke patient? no    Neuro:  Cheryl Coma Scale  Best Eye Response: 4-->(E4) spontaneous  Best Motor Response: 6-->(M6) obeys commands  Best Verbal Response: 4-->(V4) confused  Cheryl Coma Scale Score: 14  Assessment Qualifiers: patient not sedated/intubated  Pupil PERRLA: no     24hr Temp:  [97.5 °F (36.4 °C)-98 °F (36.7 °C)]     CV:   Rhythm: normal sinus rhythm  BP goals:   SBP < 180  MAP > 65    Resp:      Vent Mode: A/C  Set Rate: 14 BPM  Oxygen Concentration (%): 30  Vt Set: 300 mL  PEEP/CPAP: 5 cmH20  Pressure Support: 5 cmH20    Plan: N/A    GI/:     Diet/Nutrition Received: mechanical/dental soft  Last Bowel Movement: 06/21/23  Voiding Characteristics: external catheter    Intake/Output Summary (Last 24 hours) at 6/27/2023 0621  Last data filed at 6/27/2023 0400  Gross per 24 hour   Intake 146.36 ml   Output 4050 ml   Net -3903.64 ml     Unmeasured Output  Urine Occurrence: 1  Stool Occurrence: 0  Emesis Occurrence: 0  Pad Count: 1    Labs/Accuchecks:  Recent Labs   Lab 06/27/23  0027   WBC 2.95*   RBC 2.65*   HGB 8.2*   HCT 26.3*   PLT 44*      Recent Labs   Lab 06/27/23  0027   *   K 3.5   CO2 21*   *   BUN 8   CREATININE 0.4*   ALKPHOS 109   ALT 20   AST 34   BILITOT 7.5*      Recent Labs   Lab 06/21/23  0912 06/22/23  0112 06/27/23  0027   INR  --    < > 1.9*   APTT 33.8*  --   --     < > = values in this interval not displayed.    No results for input(s): CPK, CPKMB, TROPONINI, MB in the last 168 hours.    Electrolytes: Electrolytes replaced  Accuchecks:  none    Gtts:      LDA/Wounds:  Lines/Drains/Airways       Drain  Duration             Female External Urinary Catheter 06/22/23 0801 4 days              Peripheral Intravenous Line  Duration                  Peripheral IV - Single Lumen 06/15/23 1229 20 G;1 3/4 in Right Lower leg 11 days         Peripheral IV - Single Lumen 06/21/23 0012 20 G Anterior;Left Upper Arm 6 days         Peripheral IV - Single Lumen 06/25/23 0133 22 G Left;Posterior Forearm 2 days                  Wounds: No  Wound care consulted: No

## 2023-06-27 NOTE — PROGRESS NOTES
[facility] - Neuro Critical Care  Neurocritical Care  Progress Note    Admit Date: 5/28/2023  Service Date: 06/27/2023  Length of Stay: 30    Subjective:     Chief Complaint: Non-convulsive status epilepticus    History of Present Illness: Marely Hamilton is a 58 year old female with a medical history significant for EtOH induced hepatic cirrhosis, seizure disorder on outpatient LEV and ZNS and bipolar disorder who was admitted to [facility] [place] on 5/28 as a transfer from OSH for evaluation of persistent encephalopathy concerning for NCSE vs hepatic encephalopathy. History is obtained from chart as patient is unresponsive and unable to assist. Initially presented to [facility] on 5/18 for encephalopathy and thought to be multifactorial including UTI (Proteus mirabilis s/p CTX course), hypercalcemia 2/2 lithium toxicity (Lithium changed to Abilify per Psych), as well as hepatic encephalopathy secondary to medication non compliance. She was treated with lactulose and rifaximin with no improvement in her mental status. EEG showed generalized slowing as well as triphasic discharges in the left hemisphere. MRI Brain WO was unremarkable for acute neurologic change. Decision was made to transfer patient to [facility] [place] for higher level of care and ongoing evaluation and management. On arrival, mental status exam has waxed and waned with patient being intermittently verbal and following commands. NH4 48.    NCC is consulted on hospital day 4 for admission after EEG showed frequent left hemispheric electrographic seizures lasting on average 10-30 seconds with prolonged seizures over 1.5 hours also noted. Per chart review, patient home dose of LEV increased from 1000mg to 1500mg BID, ZNS increased to 200mg. Vimpat 150mg BID added per General Neurology (had not been given prior to consult). On initial evaluation, patient is not responsive to voice or pain. Upward gaze noted. Decision made to transfer patient to [facility] for higher level of care and  airway watch.       Hospital Course: 06/02/2023 Tachycardic and hypotensive, transferred for development of mostly right hemispheric status, LOC exam remains poor  Intubated for airway protection, EEG has changed to mostly include triphasics with no definite seizure activity per Dr Boss, propofol stopped and AEDs simplified to keppra 1000mg bid only, wean off pressor, give colloids with crystalloid resuscitation  06/03/2023: remains on persistant significant pressor support despite adequate hydration, problems with third spacing and may have pulmonary hypertension as well, consider trial of stress steroids if hgb stabilizes  06/04/2023: levo down to 0.08mcg, start and advance TFs, vaso at 0.04U, ammonia has been stable, slight rise in tbili, check direct bili, hgb and plts improved, no clear source of bleeding, no source of bleeding on CT abdomen, consider superimposed hemolysis  06/05/2023 NAEON. Neurologic exam continues to improve, following commands in all extremities. Levo 0.02, weaning as able. Vaso discontinued, MAP>65. Daily SBT, monitor and hold TF at midnight for possible extubation tomorrow. Hemolysis work up ongoing, trending CBC. Continue CTX for SBP prophylaxis.   06/06/2023 Awake and following commands. SBT with low tidal volumes. Remains intubated for airway protection at this time with possible extubation tomorrow with continued neurologic improvement. Levo discontinued, maintaining SBP<65. Concern for hemolytic anemia related to autoimmune process vs hepatic dysfunction (elevated reticulocyte count and decreased haptoglobin).  06/07/2023 Rested on AC overnight due to low TV and fatigue. SBT with pressure support 10/5 this am, weaning ventilator as tolerated. Continue tube feeds with goal to optimize nutrition. Ammonia elevated, continue lactulose to encourage bowel movement.   06/08/2023 Failed SBT due to apnea. Some breaths on SIMV. Awake and following commands. Ammonia trending down (44) with  BMs, continue lactulose. Advance nutritional support with goal to increase strength towards extubation. Plt transfusions PRN. Cryo for fibrinogen <100.  06/09/2023 Venous US with R brachial and femoral DVT. Dicussed with Hematology with recommendation to start Argatroban with plts>50. Daily SBT with apnea, maintain on SIMV with backup rate of 10. Hyperammonemia resolved.   06/10/2023 patient is more alert and awake, tolerated SBT. Hematology agreed to switch to heparin for DVT given negative for HIT  06/11/2023   On AM rounds, patient was noted to be hypoxic and tachycardic. Hypoxia resolved with ventilator changes but patient continued to appear uncomfortable. Patient lied flat with noted improvement in oxygenation. Patient became acutely hypotensive, tachycardiac and hypoxic not responsive to ventilator changes. IVF bolus + Burton bump x3 + initiation of vasopressin due to concern for hypovolemic shock. Levophed added due to persistent hypertension. L femoral arterial line and R IJ trialysis placed emergently. STAT Hemoglobin 4.4. Received protamine for heparin reversal, 3u Plts and 3 FFP and 2 pRBC. STAT CTA abdo/pelvis with L retroperitoneal hematoma with + spot sign. IR consulted and patient taken class A for diagnostic angiogram with possible emobolization. Pressors weaned after volume resuscitation. Family updated over phone.   06/12/2023 s/p IR embolization of left lumbar and internal iliac branch foci of arterial extravasation. Hemoglobin stable, continue to trend CBC q8 with transfusion of pRBC for Hg<7. Restart trickle feeds. Albumin and lasix for dieresis. Low threshold for antibiotics given risk of SBP and RP hematoma. Unlikely to tolerate AC, will discuss placement of IVC filter with IR.   06/13/2023 Attempted to wean FiO2 overnight with noted drop of pO2. FiO2 increased to 55% and patient received albumin and was diuresed. Weaning this am with ABGs. Remains on fentanyl 12.5, neurologic exam unchanged. Hg  stable. Post AM rounds, patient with acute increase in O2 needs, STAT CTA chest/abd/pelvis to assess for PE vs further hemorrhage stable and without new findings. Continue current management.   06/14/2023 NAEON. Hg stable at 7.6. Plt 44, transfuse for <50. Pending IVC filter per IR for DVT burden with contraindications to AC.  06/15/2023 s/p IVC filter placement. Daily SBT, complicated by anxiety. Remains on spontaneous mode, 10/5, 40% FiO2 with goal to wean as able.   06/16/2023 Rested on AC overnight. Plts low, received 1 pack overnight. Daily SBT with plan to extubate as able.   06/17/2023 extubated on BiPap yesterday, weaning off FiO2. Plts trended down again, received transfusion overnight, no sign of active bleeding.   06/18/2023 Hg 6.7, receiving 1u pRBC. Plan to wean Bipap to high flow NC.  06/19/2023: down to 4L with sats at 100%, still requiring occasional transfusion, otherwise awake and able to interact appropriately with examiner  06/20/2023: Increasing O2 requirements, CXR with atelectasis, continued pancytopenia.  06/21/2023: Continued hypoxia. Bilirubin trending up. Spironolactone increased, lasix 20mg x 1 ordered. D5W bolus. Ammonia level WNL. ABG and CXR ordered.   6/22/23: intubated overnight  6/23/23: Extubated. CPAP QHS. Cryo x1 given.   06/24/2023 tolerating extubation  06/25/2023 tolerating extubation, bili downtrending, continued resp insufficiency  06/26/2023 naeon, tolerating cpap at night, continued resp insufficiency  06/27/2023 transfer to hospital medicine, discussed with team      Interval History: See hospital course.     Review of Systems: Review of Systems   Constitutional: Negative for chills and fever.   Eyes: Negative for blurred vision and double vision.   Respiratory: Negative for cough and shortness of breath.    Cardiovascular: Negative for chest pain and palpitations.   Gastrointestinal: Positive for constipation. Negative for nausea and vomiting.   Genitourinary: Negative  for frequency and urgency.   Musculoskeletal: Negative for back pain and neck pain.   Neurological: Negative for dizziness, weakness and headaches.   Endo/Heme/Allergies: Bruises/bleeds easily.   Psychiatric/Behavioral: Positive for depression. The patient is nervous/anxious.      Vitals:   Temp: 98 °F (36.7 °C)  Pulse: 82  Rhythm: normal sinus rhythm, sinus tachycardia  BP: 120/68  MAP (mmHg): 87  Resp: (!) 24  SpO2: (!) 94 %  Oxygen Concentration (%): 30    Temp  Min: 97.5 °F (36.4 °C)  Max: 98 °F (36.7 °C)  Pulse  Min: 76  Max: 112  BP  Min: 115/63  Max: 176/81  MAP (mmHg)  Min: 83  Max: 116  Resp  Min: 9  Max: 24  SpO2  Min: 90 %  Max: 100 %  Oxygen Concentration (%)  Min: 30  Max: 30    06/26 0701 - 06/27 0700  In: 146.4 [I.V.:49.6]  Out: 4050 [Urine:4050]   Unmeasured Output  Urine Occurrence: 1  Stool Occurrence: 0  Emesis Occurrence: 0  Pad Count: 1     Examination:   Constitutional: Well-nourished and -developed. No apparent distress. Ecchymosis throoughout.   Eyes: Conjunctiva clear, anicteric. Lids no lesions.  Head/Ears/Nose/Mouth/Throat/Neck: Moist mucous membranes. External ears, nose atraumatic. Abrasion to center of nose.  Cardiovascular: Regular rhythm. No murmurs. No leg edema.  Respiratory: Comfortable respirations. Clear to auscultation.  Gastrointestinal: No hernia. Soft, nondistended, nontender. + bowel sounds.    Neurologic:  -GCS [id number]  -Alert. Oriented to person, place, and time. Speech fluent. Follows commands.  -Cranial nerves grossly intact, particularly   -Motor SINGH  -SILT      Medications:   Continuous Scheduledbacitracin zinc, , TID  furosemide, 40 mg, BID  lactulose, 15 g, TID  levetiracetam, 1,000 mg, Q12H  mupirocin, , Daily  niCARdipine, ,   [START ON 6/28/2023] OLANZapine, 15 mg, QHS  rifAXIMin, 550 mg, BID  silodosin, 4 mg, Daily  spironolactone, 50 mg, Daily  traZODone, 50 mg, QHS    PRNsodium chloride, , Q24H PRN  sodium chloride, , Q24H PRN  sodium chloride, , Q24H  PRN  albuterol-ipratropium, 3 mL, Q6H PRN  dextrose 10%, 12.5 g, PRN  dextrose 10%, 25 g, PRN  dextrose, 15 g, PRN  dextrose, 30 g, PRN  hydrOXYzine HCL, 50 mg, Nightly PRN  magnesium sulfate IVPB, 2 g, PRN  magnesium sulfate IVPB, 4 g, PRN  naloxone, 0.02 mg, PRN  ondansetron, 4 mg, Q8H PRN  oxyCODONE, 5 mg, Q6H PRN  potassium chloride, 40 mEq, PRN   And  potassium chloride, 60 mEq, PRN   And  potassium chloride, 80 mEq, PRN  racepinephrine, 0.5 mL, Q4H PRN  simethicone, 1 tablet, QID PRN  sodium chloride 0.9%, 10 mL, Q8H PRN  sodium phosphate IVPB, 15 mmol, PRN  sodium phosphate IVPB, 20.01 mmol, PRN  sodium phosphate IVPB, 30 mmol, PRN       Today I independently reviewed pertinent medications, lines/drains/airways, imaging, cardiology results, laboratory results, microbiology results, notably:     ISTAT: No results for input(s): PH, PCO2, PO2, POCSATURATED, HCO3, BE, POCNA, POCK, POCTCO2, POCGLU, POCICA, POCLAC, SAMPLE in the last 24 hours.   Chem:   Recent Labs   Lab 06/27/23  0027   *   K 3.5   *   CO2 21*   *   BUN 8   CREATININE 0.4*   CALCIUM 8.5*   MG 1.7   PHOS 2.7   ANIONGAP 9   PROT 5.6*   ALBUMIN 2.8*   BILITOT 7.5*   ALKPHOS 109   AST 34   ALT 20     Heme:   Recent Labs   Lab 06/27/23  0027   WBC 2.95*   HGB 8.2*   HCT 26.3*   PLT 44*   INR 1.9*     Endo: No results for input(s): POCTGLUCOSE in the last 24 hours.       Assessment/Plan:     Neuro  * Non-convulsive status epilepticus  58F with EtOH induced hepatic cirrhosis, seizure disorder on outpatient LEV and ZNS and bipolar disorder admitted on 5/28 for persistent encephalopathy.    - UTI on admit (Proteus mirabilis), now s/p CTX course  - Hypercalcemia 2/2 lithium toxicity (Lithium changed to Abilify per Psych)  - Hepatic encephalopathy 2/2 to medication non compliance, treated with lactulose and rifaximin     MRI Brain WO was unremarkable for acute neurologic change  EEG w/ frequent left hemispheric electrographic seizures and  NCSE  Repeat EEGs without seizure activity x 24 hours, now resolved    6/11 --> acute hypotension, hypoxia --> hypovolemic shock --> Large R peritoneal hematoma --> IR for class A embo --> s/p IR embolization of left lumbar and internal iliac branch foci of arterial extravasation  6/13 --> episode of hypoxia and SOB --> CTA PE without evidence of PE, CT C/A/P without new bleed    - Continued admission to [facility]  - Q4h neurochecks while in ICU  - Q1h vitals while in ICU  - HOB@30  - Keppra 500mg BID  - Thiamine replacement   - Lactulose, titrate to BM  - MAP>65  - Daily CMP, Mag, Phos - replete electrolytes PRN  - Lipid panel, A1c, Coags reviewed  - Daily CBC, transfuse PRN and replace Plts <25  - Daily fibrinogen, PT/INR  - Heparin held in setting of acute bleed/DIC, IVC filter in place  - Keppra 1g BID, cEEG without captured seizure 6/22   - PT/OT/SLP as appropriate  - CM/SW consult for dispo planning    Acute encephalopathy  Multifactorial     -delirium precautions  -ammonia pending  -resume lactulose tid    Psychiatric  Bipolar 1 disorder  Increase olanzapine and just treat with monotherapy      Hematology  Acute deep vein thrombosis (DVT) of brachial vein of right upper extremity  Nonocclusive R brachial vein DVT in area of previous IV site, noted on 6/8  Also R IJ DVT noted on 6/19  Argatroban per Heme recs - transitioned to heparin 6/10  HELD 6/11 in setting of retroperitoneal bleed  IVC filter placed 6/14  Unable to anticoagulate due to continued pancytopenia/thrombocytopenia    Thrombocytopenia  Likely secondary to hepatic cirrhosis and development of splenomegaly  Hematology consulted, tested negative for HIT    6/11 --> acute hypotension, hypoxia --> hypovolemic shock --> Large R peritoneal hematoma --> IR for class A embo --> s/p IR embolization of left lumbar and internal iliac branch foci of arterial extravasation    Plts continue to trending down despite transfusion, however, no active bleeding. Will  transfuse if plt < 25, or < 50 with active bleeding    Endocrine  Hyperammonemia  Ammonia pending  Resume lactulose       GI  Acute liver failure without hepatic coma  Cont spironolactone and lasix  Bili downtrending  INR improving    Hepatic cirrhosis  Ammonia normalized   lfts unremarkable  Bili downtrending  Coags/inr mildly abnormal  Continued encephalopathy      Cont rifaximin  Spironolactone, monitor K level, replaced prn          The patient is being Prophylaxed for:  Venous Thromboembolism with: Mechanical  Stress Ulcer with: Not Applicable   Ventilator Pneumonia with: not applicable    Activity Orders          Diet Dysphagia Mechanical Soft (IDDSI Level 5): Dysphagia 2 (Mechanical Soft Ground) starting at 06/26 1129    Discontinue arterial line starting at 06/18 1927    Elevate HOB flat until bedrest complete starting at 06/11 1641    Turn patient starting at 05/28 2253    Progressive Mobility Protocol (mobilize patient to their highest level of functioning at least twice daily) starting at 05/28 2000        Full Code    Marietta Esteban PA-C  Neurocritical Care  [facility] - Neuro Critical Care

## 2023-06-27 NOTE — PLAN OF CARE
Hospital Medicine ICU Acceptance Note    Date of Admit: 5/28/2023  Date of Transfer / Stepdown: 6/27/2023  Shankar, C/J, H, L, Onc (IV chemo w/in 1 month), Gyn/Onc, or other special case?: no   ICU team stepping patient down: NCC  ICU team member giving verbal handoff: KIARRA Hannon  Accepting  team: S    Brief History of Present Illness:      Marely Hamilton is a 58 year old female with a medical history significant for EtOH induced hepatic cirrhosis, seizure disorder on outpatient LEV and ZNS and bipolar disorder who was admitted to [facility] on 5/28 as a transfer from OSH for evaluation of persistent encephalopathy concerning for NCSE vs hepatic encephalopathy. History is obtained from chart as patient is unresponsive and unable to assist. Initially presented to [facility] on 5/18 for encephalopathy and thought to be multifactorial including UTI (Proteus mirabilis s/p CTX course), hypercalcemia 2/2 lithium toxicity (Lithium changed to Abilify per Psych), as well as hepatic encephalopathy secondary to medication non compliance. She was treated with lactulose and rifaximin with no improvement in her mental status. EEG showed generalized slowing as well as triphasic discharges in the left hemisphere. MRI Brain WO was unremarkable for acute neurologic change. Decision was made to transfer patient to [facility] for higher level of care and ongoing evaluation and management. On arrival, mental status exam has waxed and waned with patient being intermittently verbal and following commands. NH4 48.     NCC is consulted on hospital day 4 for admission after EEG showed frequent left hemispheric electrographic seizures lasting on average 10-30 seconds with prolonged seizures over 1.5 hours also noted. Per chart review, patient home dose of LEV increased from 1000mg to 1500mg BID, ZNS increased to 200mg. Vimpat 150mg BID added per General Neurology (had not been given prior to consult). On initial evaluation, patient is  not responsive to voice or pain. Upward gaze noted. Decision made to transfer patient to [facility] for higher level of care and airway watch.           Hospital/ICU Course:     Hospital Course: 06/02/2023 Tachycardic and hypotensive, transferred for development of mostly right hemispheric status, LOC exam remains poor  Intubated for airway protection, EEG has changed to mostly include triphasics with no definite seizure activity per Dr Boss, propofol stopped and AEDs simplified to keppra 1000mg bid only, wean off pressor, give colloids with crystalloid resuscitation  06/03/2023: remains on persistant significant pressor support despite adequate hydration, problems with third spacing and may have pulmonary hypertension as well, consider trial of stress steroids if hgb stabilizes  06/04/2023: levo down to 0.08mcg, start and advance TFs, vaso at 0.04U, ammonia has been stable, slight rise in tbili, check direct bili, hgb and plts improved, no clear source of bleeding, no source of bleeding on CT abdomen, consider superimposed hemolysis  06/05/2023 NAEON. Neurologic exam continues to improve, following commands in all extremities. Levo 0.02, weaning as able. Vaso discontinued, MAP>65. Daily SBT, monitor and hold TF at midnight for possible extubation tomorrow. Hemolysis work up ongoing, trending CBC. Continue CTX for SBP prophylaxis.   06/06/2023 Awake and following commands. SBT with low tidal volumes. Remains intubated for airway protection at this time with possible extubation tomorrow with continued neurologic improvement. Levo discontinued, maintaining SBP<65. Concern for hemolytic anemia related to autoimmune process vs hepatic dysfunction (elevated reticulocyte count and decreased haptoglobin).  06/07/2023 Rested on AC overnight due to low TV and fatigue. SBT with pressure support 10/5 this am, weaning ventilator as tolerated. Continue tube feeds with goal to optimize nutrition. Ammonia elevated, continue lactulose to  encourage bowel movement.   06/08/2023 Failed SBT due to apnea. Some breaths on SIMV. Awake and following commands. Ammonia trending down (44) with BMs, continue lactulose. Advance nutritional support with goal to increase strength towards extubation. Plt transfusions PRN. Cryo for fibrinogen <100.  06/09/2023 Venous US with R brachial and femoral DVT. Dicussed with Hematology with recommendation to start Argatroban with plts>50. Daily SBT with apnea, maintain on SIMV with backup rate of 10. Hyperammonemia resolved.   06/10/2023 patient is more alert and awake, tolerated SBT. Hematology agreed to switch to heparin for DVT given negative for HIT  06/11/2023   On AM rounds, patient was noted to be hypoxic and tachycardic. Hypoxia resolved with ventilator changes but patient continued to appear uncomfortable. Patient lied flat with noted improvement in oxygenation. Patient became acutely hypotensive, tachycardiac and hypoxic not responsive to ventilator changes. IVF bolus + Burton bump x3 + initiation of vasopressin due to concern for hypovolemic shock. Levophed added due to persistent hypertension. L femoral arterial line and R IJ trialysis placed emergently. STAT Hemoglobin 4.4. Received protamine for heparin reversal, 3u Plts and 3 FFP and 2 pRBC. STAT CTA abdo/pelvis with L retroperitoneal hematoma with + spot sign. IR consulted and patient taken class A for diagnostic angiogram with possible emobolization. Pressors weaned after volume resuscitation. Family updated over phone.   06/12/2023 s/p IR embolization of left lumbar and internal iliac branch foci of arterial extravasation. Hemoglobin stable, continue to trend CBC q8 with transfusion of pRBC for Hg<7. Restart trickle feeds. Albumin and lasix for dieresis. Low threshold for antibiotics given risk of SBP and RP hematoma. Unlikely to tolerate AC, will discuss placement of IVC filter with IR.   06/13/2023 Attempted to wean FiO2 overnight with noted drop of pO2.  FiO2 increased to 55% and patient received albumin and was diuresed. Weaning this am with ABGs. Remains on fentanyl 12.5, neurologic exam unchanged. Hg stable. Post AM rounds, patient with acute increase in O2 needs, STAT CTA chest/abd/pelvis to assess for PE vs further hemorrhage stable and without new findings. Continue current management.   06/14/2023 NAEON. Hg stable at 7.6. Plt 44, transfuse for <50. Pending IVC filter per IR for DVT burden with contraindications to AC.  06/15/2023 s/p IVC filter placement. Daily SBT, complicated by anxiety. Remains on spontaneous mode, 10/5, 40% FiO2 with goal to wean as able.   06/16/2023 Rested on AC overnight. Plts low, received 1 pack overnight. Daily SBT with plan to extubate as able.   06/17/2023 extubated on BiPap yesterday, weaning off FiO2. Plts trended down again, received transfusion overnight, no sign of active bleeding.   06/18/2023 Hg 6.7, receiving 1u pRBC. Plan to wean Bipap to high flow NC.  06/19/2023: down to 4L with sats at 100%, still requiring occasional transfusion, otherwise awake and able to interact appropriately with examiner  06/20/2023: Increasing O2 requirements, CXR with atelectasis, continued pancytopenia.  06/21/2023: Continued hypoxia. Bilirubin trending up. Spironolactone increased, lasix 20mg x 1 ordered. D5W bolus. Ammonia level WNL. ABG and CXR ordered.   6/22/23: intubated overnight  6/23/23: Extubated. CPAP QHS. Cryo x1 given.   06/24/2023 tolerating extubation  06/25/2023 tolerating extubation, bili downtrending, continued resp insufficiency  06/26/2023 naeon, tolerating cpap at night, continued resp insufficiency  06/27/2023 transfer to hospital medicine, discussed with team      Consultants and Procedures:     Consultants:  GI  Heme onc  Epilepsy  Wound care  Psych    Procedures:    EEG complete    Transfer Information:     Diet:  Mech soft    Physical Activity:  As tolerated     To Do / Pending Studies / Follow ups: placement to  SNF    Patient has been accepted by Hospital Medicine Team S, who will assume care of the patient upon arrival to the floor from the ICU. Please contact ICU team with any concerns prior to arrival. Please contact Hospital Medicine at [phone] or [phone] (please do NOT leave a voicemail) when patient arrives to the floor.    Mirian Chambers MD  Hospital Medicine Staff

## 2023-06-27 NOTE — CONSULTS
[facility]  Wound Care    Patient Name:  Marely Hamilton   MRN:  [id number]  Date: 6/27/2023  Diagnosis: Non-convulsive status epilepticus    History:     Past Medical History:   Diagnosis Date    Addiction to drug     Alcohol abuse     Alcohol abuse, in remission 6/15/2015    14.5 weeks ago; AA weekly    Anemia     Anxiety 6/15/2015    Behavioral problem     Bipolar disorder     Bipolar disorder in remission 6/15/2015    Cirrhosis, Laennec's 6/15/2015    Depression     Encounter for blood transfusion     Epistaxis 6/15/2015    Fatigue     Glaucoma     Hematuria     Hepatic encephalopathy 6/15/2015    Hepatic enlargement     History of psychiatric care     History of psychiatric hospitalization     History of seizure 6/15/2015    1    hx of intentional Tylenol overdose 6/15/2015    2005- situational and hx of bipolar    Hx of psychiatric care     Macrocytic anemia 9/18/2015    6 units PRBC since June 2015    [facility]     Osteoarthritis     Other ascites 6/15/2015    Psychiatric exam requested by authority     Psychiatric problem     Psychosis 9/26/2019    Renal disorder     Seizures     Self-harming behavior     Suicide attempt     Therapy     Thrombocytopenia 6/15/2015       Social History     Socioeconomic History    Marital status: Single   Occupational History    Occupation: Disabled     Employer: DISABLED   Tobacco Use    Smoking status: Never    Smokeless tobacco: Never   Substance and Sexual Activity    Alcohol use: Not Currently     Comment: hx of ETOH abuse with cirrhosis of liver    Drug use: No    Sexual activity: Not Currently   Other Topics Concern    Patient feels they ought to cut down on drinking/drug use No    Patient annoyed by others criticizing their drinking/drug use No    Patient has felt bad or guilty about drinking/drug use No    Patient has had a drink/used drugs as an eye opener in the AM No   Social History Narrative    Pt has 1 older and 1 younger sister.  Her father  committed suicide when she was 18.  She has a [employer] degree and worked as an , but she has been disabled since age 40.  She never  and has no children.  She is not dating and claims no current friends.  She currently lives with her mother along with her pet dog.  She denies any hobbies and says she is not Zoroastrian.     Social Determinants of Health     Financial Resource Strain: Unknown    Difficulty of Paying Living Expenses: Patient refused   Food Insecurity: Unknown    Worried About Running Out of Food in the Last Year: Patient refused    Ran Out of Food in the Last Year: Patient refused   Transportation Needs: Unknown    Lack of Transportation (Medical): Patient refused    Lack of Transportation (Non-Medical): Patient refused   Physical Activity: Unknown    Days of Exercise per Week: Patient refused    Minutes of Exercise per Session: Patient refused   Stress: Unknown    Feeling of Stress : Patient refused   Social Connections: Unknown    Frequency of Communication with Friends and Family: Patient refused    Frequency of Social Gatherings with Friends and Family: Patient refused    Attends Yarsanism Services: Patient refused    Active Member of Clubs or Organizations: Patient refused    Attends Club or Organization Meetings: Patient refused    Marital Status: Patient refused   Housing Stability: Unknown    Unable to Pay for Housing in the Last Year: Patient refused    Number of Places Lived in the Last Year: 1    Unstable Housing in the Last Year: Patient refused       Precautions:     Allergies as of 05/27/2023 - Reviewed 05/21/2023   Allergen Reaction Noted    Sulfa (sulfonamide antibiotics) Rash 11/26/2014    Codeine Nausea And Vomiting 04/26/2018       [facility] Assessment Details/Treatment       Wound consult received on skin tear to upper sternum. Recommend weekly mepilex foam dressing changes. Nursing to continue care.     06/27/23 1119        Altered Skin Integrity 06/27/23 0506 upper Sternal Skin  Tear   Date First Assessed/Time First Assessed: 06/27/23 050   Altered Skin Integrity Present on Admission - Did Patient arrive to the hospital with altered skin?: suspected hospital acquired  Orientation: upper  Location: Sternal  Primary Wound Type: Skin ...   Drainage Amount None   Drainage Characteristics/Odor No odor   Appearance Moist;Red   Tissue loss description Partial thickness   Red (%), Wound Tissue Color 100 %   Periwound Area Ecchymotic   Wound Edges Jagged;Open   Wound Length (cm) 2 cm   Wound Width (cm) 2 cm   Wound Depth (cm) 0.1 cm   Wound Volume (cm^3) 0.4 cm^3   Wound Surface Area (cm^2) 4 cm^2   Care Cleansed with:;Sterile normal saline   Dressing Changed;Applied;Foam   Dressing Change Due 07/04/23

## 2023-06-28 PROBLEM — G40.919 BREAKTHROUGH SEIZURE: Status: RESOLVED | Noted: 2017-01-22 | Resolved: 2023-06-28

## 2023-06-28 LAB
ALBUMIN SERPL BCP-MCNC: 2.6 G/DL (ref 3.5–5.2)
ALP SERPL-CCNC: 125 U/L (ref 55–135)
ALT SERPL W/O P-5'-P-CCNC: 19 U/L (ref 10–44)
ANION GAP SERPL CALC-SCNC: 7 MMOL/L (ref 8–16)
ANISOCYTOSIS BLD QL SMEAR: SLIGHT
AST SERPL-CCNC: 36 U/L (ref 10–40)
BASOPHILS # BLD AUTO: 0.01 K/UL (ref 0–0.2)
BASOPHILS NFR BLD: 0.2 % (ref 0–1.9)
BILIRUB SERPL-MCNC: 6.8 MG/DL (ref 0.1–1)
BUN SERPL-MCNC: 6 MG/DL (ref 6–20)
CALCIUM SERPL-MCNC: 9 MG/DL (ref 8.7–10.5)
CHLORIDE SERPL-SCNC: 114 MMOL/L (ref 95–110)
CO2 SERPL-SCNC: 22 MMOL/L (ref 23–29)
CREAT SERPL-MCNC: 0.4 MG/DL (ref 0.5–1.4)
DIFFERENTIAL METHOD: ABNORMAL
EOSINOPHIL # BLD AUTO: 0.1 K/UL (ref 0–0.5)
EOSINOPHIL NFR BLD: 1.2 % (ref 0–8)
ERYTHROCYTE [DISTWIDTH] IN BLOOD BY AUTOMATED COUNT: 27.9 % (ref 11.5–14.5)
EST. GFR  (NO RACE VARIABLE): >60 ML/MIN/1.73 M^2
GLUCOSE SERPL-MCNC: 116 MG/DL (ref 70–110)
HCT VFR BLD AUTO: 25.4 % (ref 37–48.5)
HGB BLD-MCNC: 7.9 G/DL (ref 12–16)
HYPOCHROMIA BLD QL SMEAR: ABNORMAL
IMM GRANULOCYTES # BLD AUTO: 0.02 K/UL (ref 0–0.04)
IMM GRANULOCYTES NFR BLD AUTO: 0.5 % (ref 0–0.5)
INR PPP: 1.9 (ref 0.8–1.2)
LYMPHOCYTES # BLD AUTO: 0.7 K/UL (ref 1–4.8)
LYMPHOCYTES NFR BLD: 17.2 % (ref 18–48)
MAGNESIUM SERPL-MCNC: 1.7 MG/DL (ref 1.6–2.6)
MCH RBC QN AUTO: 30.6 PG (ref 27–31)
MCHC RBC AUTO-ENTMCNC: 31.1 G/DL (ref 32–36)
MCV RBC AUTO: 98 FL (ref 82–98)
MONOCYTES # BLD AUTO: 0.4 K/UL (ref 0.3–1)
MONOCYTES NFR BLD: 10.5 % (ref 4–15)
NEUTROPHILS # BLD AUTO: 2.8 K/UL (ref 1.8–7.7)
NEUTROPHILS NFR BLD: 70.4 % (ref 38–73)
NRBC BLD-RTO: 0 /100 WBC
OVALOCYTES BLD QL SMEAR: ABNORMAL
PHOSPHATE SERPL-MCNC: 2.8 MG/DL (ref 2.7–4.5)
PLATELET # BLD AUTO: 46 K/UL (ref 150–450)
PLATELET BLD QL SMEAR: ABNORMAL
PMV BLD AUTO: 10.2 FL (ref 9.2–12.9)
POIKILOCYTOSIS BLD QL SMEAR: SLIGHT
POLYCHROMASIA BLD QL SMEAR: ABNORMAL
POTASSIUM SERPL-SCNC: 3.9 MMOL/L (ref 3.5–5.1)
PROT SERPL-MCNC: 5.2 G/DL (ref 6–8.4)
PROTHROMBIN TIME: 19.5 SEC (ref 9–12.5)
RBC # BLD AUTO: 2.58 M/UL (ref 4–5.4)
SODIUM SERPL-SCNC: 143 MMOL/L (ref 136–145)
SPHEROCYTES BLD QL SMEAR: ABNORMAL
TOXIC GRANULES BLD QL SMEAR: PRESENT
WBC # BLD AUTO: 4.01 K/UL (ref 3.9–12.7)

## 2023-06-28 PROCEDURE — 99900026 HC AIRWAY MAINTENANCE (STAT)

## 2023-06-28 PROCEDURE — 80053 COMPREHEN METABOLIC PANEL: CPT

## 2023-06-28 PROCEDURE — 63600175 PHARM REV CODE 636 W HCPCS: Performed by: NURSE PRACTITIONER

## 2023-06-28 PROCEDURE — 99900035 HC TECH TIME PER 15 MIN (STAT)

## 2023-06-28 PROCEDURE — 25000003 PHARM REV CODE 250: Performed by: PHYSICIAN ASSISTANT

## 2023-06-28 PROCEDURE — 85610 PROTHROMBIN TIME: CPT | Performed by: NURSE PRACTITIONER

## 2023-06-28 PROCEDURE — 94660 CPAP INITIATION&MGMT: CPT

## 2023-06-28 PROCEDURE — 94761 N-INVAS EAR/PLS OXIMETRY MLT: CPT

## 2023-06-28 PROCEDURE — 85025 COMPLETE CBC W/AUTO DIFF WBC: CPT

## 2023-06-28 PROCEDURE — 25000003 PHARM REV CODE 250: Performed by: NURSE PRACTITIONER

## 2023-06-28 PROCEDURE — 25000003 PHARM REV CODE 250: Performed by: PSYCHIATRY & NEUROLOGY

## 2023-06-28 PROCEDURE — 83735 ASSAY OF MAGNESIUM: CPT

## 2023-06-28 PROCEDURE — 84100 ASSAY OF PHOSPHORUS: CPT

## 2023-06-28 PROCEDURE — 99233 SBSQ HOSP IP/OBS HIGH 50: CPT | Mod: FS,,, | Performed by: NURSE PRACTITIONER

## 2023-06-28 PROCEDURE — 99233 PR SUBSEQUENT HOSPITAL CARE,LEVL III: ICD-10-PCS | Mod: FS,,, | Performed by: NURSE PRACTITIONER

## 2023-06-28 PROCEDURE — 94640 AIRWAY INHALATION TREATMENT: CPT

## 2023-06-28 PROCEDURE — 27200966 HC CLOSED SUCTION SYSTEM

## 2023-06-28 PROCEDURE — 20600001 HC STEP DOWN PRIVATE ROOM

## 2023-06-28 RX ORDER — FUROSEMIDE 10 MG/ML
20 INJECTION INTRAMUSCULAR; INTRAVENOUS ONCE
Status: COMPLETED | OUTPATIENT
Start: 2023-06-28 | End: 2023-06-28

## 2023-06-28 RX ADMIN — MUPIROCIN: 20 OINTMENT TOPICAL at 08:06

## 2023-06-28 RX ADMIN — RIFAXIMIN 550 MG: 550 TABLET ORAL at 08:06

## 2023-06-28 RX ADMIN — LACTULOSE 15 G: 20 SOLUTION ORAL at 03:06

## 2023-06-28 RX ADMIN — OXYCODONE HYDROCHLORIDE 5 MG: 5 TABLET ORAL at 10:06

## 2023-06-28 RX ADMIN — LACTULOSE 15 G: 20 SOLUTION ORAL at 08:06

## 2023-06-28 RX ADMIN — FUROSEMIDE 40 MG: 40 TABLET ORAL at 06:06

## 2023-06-28 RX ADMIN — LEVETIRACETAM 1000 MG: 500 SOLUTION ORAL at 08:06

## 2023-06-28 RX ADMIN — BACITRACIN 1 EACH: 500 OINTMENT TOPICAL at 08:06

## 2023-06-28 RX ADMIN — OXYCODONE HYDROCHLORIDE 5 MG: 5 TABLET ORAL at 03:06

## 2023-06-28 RX ADMIN — BACITRACIN 1 EACH: 500 OINTMENT TOPICAL at 03:06

## 2023-06-28 RX ADMIN — OLANZAPINE 15 MG: 7.5 TABLET, FILM COATED ORAL at 08:06

## 2023-06-28 RX ADMIN — SILODOSIN 4 MG: 4 CAPSULE ORAL at 08:06

## 2023-06-28 RX ADMIN — SPIRONOLACTONE 50 MG: 25 TABLET, FILM COATED ORAL at 08:06

## 2023-06-28 RX ADMIN — TRAZODONE HYDROCHLORIDE 50 MG: 50 TABLET ORAL at 08:06

## 2023-06-28 RX ADMIN — FUROSEMIDE 40 MG: 40 TABLET ORAL at 08:06

## 2023-06-28 RX ADMIN — FUROSEMIDE 20 MG: 10 INJECTION, SOLUTION INTRAMUSCULAR; INTRAVENOUS at 10:06

## 2023-06-28 NOTE — PLAN OF CARE
Problem: Adult Inpatient Plan of Care  Goal: Optimal Comfort and Wellbeing  Outcome: Ongoing, Progressing  Goal: Readiness for Transition of Care  Outcome: Ongoing, Progressing   [facility] Care Plan    POC reviewed with Marely Hamilton and family at 0300. Pt verbalized understanding. Questions and concerns addressed. No acute events overnight. Pt progressing toward goals. Will continue to monitor. See below and flowsheets for full assessment and VS info.           Is this a stroke patient? no    Neuro:  Perham Coma Scale  Best Eye Response: 4-->(E4) spontaneous  Best Motor Response: 6-->(M6) obeys commands  Best Verbal Response: 4-->(V4) confused  Perham Coma Scale Score: 14  Assessment Qualifiers: patient not sedated/intubated  Pupil PERRLA: no     24hr Temp:  [97.8 °F (36.6 °C)-98.1 °F (36.7 °C)]     CV:   Rhythm: normal sinus rhythm  BP goals:   SBP < 180  MAP > 65    Resp:      Vent Mode: A/C  Set Rate: 14 BPM  Oxygen Concentration (%): 30  Vt Set: 300 mL  PEEP/CPAP: 5 cmH20  Pressure Support: 5 cmH20    Plan: N/A    GI/:     Diet/Nutrition Received: mechanical/dental soft  Last Bowel Movement: 06/21/23  Voiding Characteristics: external catheter    Intake/Output Summary (Last 24 hours) at 6/28/2023 0629  Last data filed at 6/28/2023 0612  Gross per 24 hour   Intake 1558.9 ml   Output 3650 ml   Net -2091.1 ml     Unmeasured Output  Urine Occurrence: 1  Stool Occurrence: 0  Emesis Occurrence: 0  Pad Count: 1    Labs/Accuchecks:  Recent Labs   Lab 06/28/23  0230   WBC 4.01   RBC 2.58*   HGB 7.9*   HCT 25.4*   PLT 46*      Recent Labs   Lab 06/28/23  0230      K 3.9   CO2 22*   *   BUN 6   CREATININE 0.4*   ALKPHOS 125   ALT 19   AST 36   BILITOT 6.8*      Recent Labs   Lab 06/21/23  0912 06/22/23  0112 06/28/23  0230   INR  --    < > 1.9*   APTT 33.8*  --   --     < > = values in this interval not displayed.    No results for input(s): CPK, CPKMB, TROPONINI, MB in the last 168 hours.    Electrolytes:  N/A - electrolytes WDL  Accuchecks: none    Gtts:      LDA/Wounds:  Lines/Drains/Airways       Drain  Duration             Female External Urinary Catheter 06/22/23 0801 5 days              Peripheral Intravenous Line  Duration                  Peripheral IV - Single Lumen 06/15/23 1229 20 G;1 3/4 in Right Lower leg 12 days         Peripheral IV - Single Lumen 06/21/23 0012 20 G Anterior;Left Upper Arm 7 days                  Wounds: Yes  Wound care consulted: Yes

## 2023-06-28 NOTE — SUBJECTIVE & OBJECTIVE
Review of Systems: Unable to obtain a complete ROS due to level of consciousness. Denies pain.    Vitals:   Temp: 98.1 °F (36.7 °C)  Pulse: 101  Rhythm: normal sinus rhythm  BP: 131/64  MAP (mmHg): 101  Resp: 16  SpO2: 98 %    Temp  Min: 97.8 °F (36.6 °C)  Max: 98.1 °F (36.7 °C)  Pulse  Min: 71  Max: 101  BP  Min: 114/72  Max: 137/77  MAP (mmHg)  Min: 82  Max: 101  Resp  Min: 10  Max: 25  SpO2  Min: 90 %  Max: 100 %    06/27 0701 - 06/28 0700  In: 1558.9 [P.O.:1350]  Out: 3650 [Urine:3650]   Unmeasured Output  Urine Occurrence: 1  Stool Occurrence: 0  Emesis Occurrence: 0  Pad Count: 1     Examination:   Constitutional: Ill appearing. No apparent distress.   Eyes: Conjunctiva clear, anicteric. Lids no lesions. Ecchymosis throughout.  Head/Ears/Nose/Mouth/Throat/Neck: Moist mucous membranes. External ears, nose atraumatic.   Cardiovascular: Regular rhythm. No leg edema.  Respiratory: Comfortable respirations. Clear to auscultation.  Gastrointestinal: Soft, nondistended, nontender. + bowel sounds.    Neurologic:  -GCS E 4 V 4 M 6  -Alert. Oriented to person, not place or time. Delayed responses. Follows commands.  -Cranial nerves: EOM intact, PERRL, no facial droop, + cough  -Motor: Moves all extremities spontaneously, antigravity  -Sensation: Intact to light touch    Medications:   Continuous Scheduledbacitracin zinc, , TID  furosemide, 40 mg, BID  lactulose, 15 g, TID  levetiracetam, 1,000 mg, Q12H  mupirocin, , Daily  OLANZapine, 15 mg, QHS  rifAXIMin, 550 mg, BID  silodosin, 4 mg, Daily  spironolactone, 50 mg, Daily  traZODone, 50 mg, QHS    PRNsodium chloride, , Q24H PRN  sodium chloride, , Q24H PRN  sodium chloride, , Q24H PRN  albuterol-ipratropium, 3 mL, Q6H PRN  dextrose 10%, 12.5 g, PRN  dextrose 10%, 25 g, PRN  dextrose, 15 g, PRN  dextrose, 30 g, PRN  hydrOXYzine HCL, 50 mg, Nightly PRN  magnesium sulfate IVPB, 2 g, PRN  magnesium sulfate IVPB, 4 g, PRN  naloxone, 0.02 mg, PRN  ondansetron, 4 mg, Q8H  PRN  oxyCODONE, 5 mg, Q6H PRN  potassium chloride, 40 mEq, PRN   And  potassium chloride, 60 mEq, PRN   And  potassium chloride, 80 mEq, PRN  racepinephrine, 0.5 mL, Q4H PRN  simethicone, 1 tablet, QID PRN  sodium chloride 0.9%, 10 mL, Q8H PRN  sodium phosphate IVPB, 15 mmol, PRN  sodium phosphate IVPB, 20.01 mmol, PRN  sodium phosphate IVPB, 30 mmol, PRN       Today I independently reviewed pertinent medications, lines/drains/airways, imaging, cardiology results, laboratory results, microbiology results, notably:     ISTAT: No results for input(s): PH, PCO2, PO2, POCSATURATED, HCO3, BE, POCNA, POCK, POCTCO2, POCGLU, POCICA, POCLAC, SAMPLE in the last 24 hours.   Chem:   Recent Labs   Lab 06/28/23  0230      K 3.9   *   CO2 22*   *   BUN 6   CREATININE 0.4*   CALCIUM 9.0   MG 1.7   PHOS 2.8   ANIONGAP 7*   PROT 5.2*   ALBUMIN 2.6*   BILITOT 6.8*   ALKPHOS 125   AST 36   ALT 19     Heme:   Recent Labs   Lab 06/28/23  0230   WBC 4.01   HGB 7.9*   HCT 25.4*   PLT 46*   INR 1.9*     Endo: No results for input(s): POCTGLUCOSE in the last 24 hours.

## 2023-06-28 NOTE — PROGRESS NOTES
[facility] - Neuro Critical Care  Neurocritical Care  Progress Note    Admit Date: 5/28/2023  Service Date: 06/28/2023  Length of Stay: 31    Subjective:     Chief Complaint: Non-convulsive status epilepticus    History of Present Illness: Marely Hamilton is a 58 year old female with a medical history significant for EtOH induced hepatic cirrhosis, seizure disorder on outpatient LEV and ZNS and bipolar disorder who was admitted to [facility] on 5/28 as a transfer from OSH for evaluation of persistent encephalopathy concerning for NCSE vs hepatic encephalopathy. History is obtained from chart as patient is unresponsive and unable to assist. Initially presented to [facility] on 5/18 for encephalopathy and thought to be multifactorial including UTI (Proteus mirabilis s/p CTX course), hypercalcemia 2/2 lithium toxicity (Lithium changed to Abilify per Psych), as well as hepatic encephalopathy secondary to medication non compliance. She was treated with lactulose and rifaximin with no improvement in her mental status. EEG showed generalized slowing as well as triphasic discharges in the left hemisphere. MRI Brain WO was unremarkable for acute neurologic change. Decision was made to transfer patient to [facility] for higher level of care and ongoing evaluation and management. On arrival, mental status exam has waxed and waned with patient being intermittently verbal and following commands. NH4 48.    NCC is consulted on hospital day 4 for admission after EEG showed frequent left hemispheric electrographic seizures lasting on average 10-30 seconds with prolonged seizures over 1.5 hours also noted. Per chart review, patient home dose of LEV increased from 1000mg to 1500mg BID, ZNS increased to 200mg. Vimpat 150mg BID added per General Neurology (had not been given prior to consult). On initial evaluation, patient is not responsive to voice or pain. Upward gaze noted. Decision made to transfer patient to [facility] for higher level of care and  airway watch.     Hospital Course: 06/02/2023 Tachycardic and hypotensive, transferred for development of mostly right hemispheric status, LOC exam remains poor  Intubated for airway protection, EEG has changed to mostly include triphasics with no definite seizure activity per Dr Boss, propofol stopped and AEDs simplified to keppra 1000mg bid only, wean off pressor, give colloids with crystalloid resuscitation  06/03/2023: remains on persistant significant pressor support despite adequate hydration, problems with third spacing and may have pulmonary hypertension as well, consider trial of stress steroids if hgb stabilizes  06/04/2023: levo down to 0.08mcg, start and advance TFs, vaso at 0.04U, ammonia has been stable, slight rise in tbili, check direct bili, hgb and plts improved, no clear source of bleeding, no source of bleeding on CT abdomen, consider superimposed hemolysis  06/05/2023 NAEON. Neurologic exam continues to improve, following commands in all extremities. Levo 0.02, weaning as able. Vaso discontinued, MAP>65. Daily SBT, monitor and hold TF at midnight for possible extubation tomorrow. Hemolysis work up ongoing, trending CBC. Continue CTX for SBP prophylaxis.   06/06/2023 Awake and following commands. SBT with low tidal volumes. Remains intubated for airway protection at this time with possible extubation tomorrow with continued neurologic improvement. Levo discontinued, maintaining SBP<65. Concern for hemolytic anemia related to autoimmune process vs hepatic dysfunction (elevated reticulocyte count and decreased haptoglobin).  06/07/2023 Rested on AC overnight due to low TV and fatigue. SBT with pressure support 10/5 this am, weaning ventilator as tolerated. Continue tube feeds with goal to optimize nutrition. Ammonia elevated, continue lactulose to encourage bowel movement.   06/08/2023 Failed SBT due to apnea. Some breaths on SIMV. Awake and following commands. Ammonia trending down (44) with BMs,  continue lactulose. Advance nutritional support with goal to increase strength towards extubation. Plt transfusions PRN. Cryo for fibrinogen <100.  06/09/2023 Venous US with R brachial and femoral DVT. Dicussed with Hematology with recommendation to start Argatroban with plts>50. Daily SBT with apnea, maintain on SIMV with backup rate of 10. Hyperammonemia resolved.   06/10/2023 patient is more alert and awake, tolerated SBT. Hematology agreed to switch to heparin for DVT given negative for HIT  06/11/2023   On AM rounds, patient was noted to be hypoxic and tachycardic. Hypoxia resolved with ventilator changes but patient continued to appear uncomfortable. Patient lied flat with noted improvement in oxygenation. Patient became acutely hypotensive, tachycardiac and hypoxic not responsive to ventilator changes. IVF bolus + Burton bump x3 + initiation of vasopressin due to concern for hypovolemic shock. Levophed added due to persistent hypertension. L femoral arterial line and R IJ trialysis placed emergently. STAT Hemoglobin 4.4. Received protamine for heparin reversal, 3u Plts and 3 FFP and 2 pRBC. STAT CTA abdo/pelvis with L retroperitoneal hematoma with + spot sign. IR consulted and patient taken class A for diagnostic angiogram with possible emobolization. Pressors weaned after volume resuscitation. Family updated over phone.   06/12/2023 s/p IR embolization of left lumbar and internal iliac branch foci of arterial extravasation. Hemoglobin stable, continue to trend CBC q8 with transfusion of pRBC for Hg<7. Restart trickle feeds. Albumin and lasix for dieresis. Low threshold for antibiotics given risk of SBP and RP hematoma. Unlikely to tolerate AC, will discuss placement of IVC filter with IR.   06/13/2023 Attempted to wean FiO2 overnight with noted drop of pO2. FiO2 increased to 55% and patient received albumin and was diuresed. Weaning this am with ABGs. Remains on fentanyl 12.5, neurologic exam unchanged. Hg  stable. Post AM rounds, patient with acute increase in O2 needs, STAT CTA chest/abd/pelvis to assess for PE vs further hemorrhage stable and without new findings. Continue current management.   06/14/2023 NAEON. Hg stable at 7.6. Plt 44, transfuse for <50. Pending IVC filter per IR for DVT burden with contraindications to AC.  06/15/2023 s/p IVC filter placement. Daily SBT, complicated by anxiety. Remains on spontaneous mode, 10/5, 40% FiO2 with goal to wean as able.   06/16/2023 Rested on AC overnight. Plts low, received 1 pack overnight. Daily SBT with plan to extubate as able.   06/17/2023 extubated on BiPap yesterday, weaning off FiO2. Plts trended down again, received transfusion overnight, no sign of active bleeding.   06/18/2023 Hg 6.7, receiving 1u pRBC. Plan to wean Bipap to high flow NC.  06/19/2023: down to 4L with sats at 100%, still requiring occasional transfusion, otherwise awake and able to interact appropriately with examiner  06/20/2023: Increasing O2 requirements, CXR with atelectasis, continued pancytopenia.  06/21/2023: Continued hypoxia. Bilirubin trending up. Spironolactone increased, lasix 20mg x 1 ordered. D5W bolus. Ammonia level WNL. ABG and CXR ordered.   6/22/23: intubated overnight  6/23/23: Extubated. CPAP QHS. Cryo x1 given.   06/24/2023 tolerating extubation  06/25/2023 tolerating extubation, bili downtrending, continued resp insufficiency  06/26/2023 naeon, tolerating cpap at night, continued resp insufficiency  06/27/2023 transfer to hospital medicine, discussed with team  06/28/2023: NAEON. Lasix 20mg IV x 1 given. Pending SD to hospital medicine.    Review of Systems: Unable to obtain a complete ROS due to level of consciousness. Denies pain.    Vitals:   Temp: 98.1 °F (36.7 °C)  Pulse: 101  Rhythm: normal sinus rhythm  BP: 131/64  MAP (mmHg): 101  Resp: 16  SpO2: 98 %    Temp  Min: 97.8 °F (36.6 °C)  Max: 98.1 °F (36.7 °C)  Pulse  Min: 71  Max: 101  BP  Min: 114/72  Max:  137/77  MAP (mmHg)  Min: 82  Max: 101  Resp  Min: 10  Max: 25  SpO2  Min: 90 %  Max: 100 %    06/27 0701 - 06/28 0700  In: 1558.9 [P.O.:1350]  Out: 3650 [Urine:3650]   Unmeasured Output  Urine Occurrence: 1  Stool Occurrence: 0  Emesis Occurrence: 0  Pad Count: 1     Examination:   Constitutional: Ill appearing. No apparent distress.   Eyes: Conjunctiva clear, anicteric. Lids no lesions. Ecchymosis throughout.  Head/Ears/Nose/Mouth/Throat/Neck: Moist mucous membranes. External ears, nose atraumatic.   Cardiovascular: Regular rhythm. No leg edema.  Respiratory: Comfortable respirations. Clear to auscultation.  Gastrointestinal: Soft, nondistended, nontender. + bowel sounds.    Neurologic:  -GCS E 4 V 4 M 6  -Alert. Oriented to person, not place or time. Delayed responses. Follows commands.  -Cranial nerves: EOM intact, PERRL, no facial droop, + cough  -Motor: Moves all extremities spontaneously, antigravity  -Sensation: Intact to light touch    Medications:   Continuous Scheduledbacitracin zinc, , TID  furosemide, 40 mg, BID  lactulose, 15 g, TID  levetiracetam, 1,000 mg, Q12H  mupirocin, , Daily  OLANZapine, 15 mg, QHS  rifAXIMin, 550 mg, BID  silodosin, 4 mg, Daily  spironolactone, 50 mg, Daily  traZODone, 50 mg, QHS    PRNsodium chloride, , Q24H PRN  sodium chloride, , Q24H PRN  sodium chloride, , Q24H PRN  albuterol-ipratropium, 3 mL, Q6H PRN  dextrose 10%, 12.5 g, PRN  dextrose 10%, 25 g, PRN  dextrose, 15 g, PRN  dextrose, 30 g, PRN  hydrOXYzine HCL, 50 mg, Nightly PRN  magnesium sulfate IVPB, 2 g, PRN  magnesium sulfate IVPB, 4 g, PRN  naloxone, 0.02 mg, PRN  ondansetron, 4 mg, Q8H PRN  oxyCODONE, 5 mg, Q6H PRN  potassium chloride, 40 mEq, PRN   And  potassium chloride, 60 mEq, PRN   And  potassium chloride, 80 mEq, PRN  racepinephrine, 0.5 mL, Q4H PRN  simethicone, 1 tablet, QID PRN  sodium chloride 0.9%, 10 mL, Q8H PRN  sodium phosphate IVPB, 15 mmol, PRN  sodium phosphate IVPB, 20.01 mmol, PRN  sodium  phosphate IVPB, 30 mmol, PRN       Today I independently reviewed pertinent medications, lines/drains/airways, imaging, cardiology results, laboratory results, microbiology results, notably:     ISTAT: No results for input(s): PH, PCO2, PO2, POCSATURATED, HCO3, BE, POCNA, POCK, POCTCO2, POCGLU, POCICA, POCLAC, SAMPLE in the last 24 hours.   Chem:   Recent Labs   Lab 06/28/23  0230      K 3.9   *   CO2 22*   *   BUN 6   CREATININE 0.4*   CALCIUM 9.0   MG 1.7   PHOS 2.8   ANIONGAP 7*   PROT 5.2*   ALBUMIN 2.6*   BILITOT 6.8*   ALKPHOS 125   AST 36   ALT 19     Heme:   Recent Labs   Lab 06/28/23  0230   WBC 4.01   HGB 7.9*   HCT 25.4*   PLT 46*   INR 1.9*     Endo: No results for input(s): POCTGLUCOSE in the last 24 hours.       Assessment/Plan:     Neuro  * Non-convulsive status epilepticus  58F with EtOH induced hepatic cirrhosis, seizure disorder on outpatient LEV and ZNS and bipolar disorder admitted on 5/28 for persistent encephalopathy.    - UTI on admit (Proteus mirabilis), now s/p CTX course  - Hypercalcemia 2/2 lithium toxicity (Lithium changed to Abilify per Psych)  - Hepatic encephalopathy 2/2 to medication non compliance, treated with lactulose and rifaximin     MRI Brain WO was unremarkable for acute neurologic change  EEG w/ frequent left hemispheric electrographic seizures and NCSE  Repeat EEGs without seizure activity x 24 hours, now resolved    6/11 --> acute hypotension, hypoxia --> hypovolemic shock --> Large R peritoneal hematoma --> IR for class A embo --> s/p IR embolization of left lumbar and internal iliac branch foci of arterial extravasation  6/13 --> episode of hypoxia and SOB --> CTA PE without evidence of PE, CT C/A/P without new bleed    - Continued admission to [facility]  - Q4h neurochecks while in ICU  - Q1h vitals while in ICU  - HOB@30  - Keppra 500mg BID  - Thiamine replacement   - Lactulose, titrate to BM  - MAP>65  - Daily CMP, Mag, Phos - replete electrolytes PRN  -  Lipid panel, A1c, Coags reviewed  - Daily CBC, transfuse PRN and replace Plts <25  - Daily fibrinogen, PT/INR  - Heparin held in setting of acute bleed/DIC, IVC filter in place  - Keppra 500mg BID, cEEG without captured seizure 6/22   - PT/OT/SLP as appropriate  - CM/SW consult for dispo planning    Acute encephalopathy  Multifactorial     -delirium precautions  -resume lactulose tid    Psychiatric  Bipolar 1 disorder  Increase olanzapine and just treat with monotherapy    Hematology  Acute deep vein thrombosis (DVT) of brachial vein of right upper extremity  Nonocclusive R brachial vein DVT in area of previous IV site, noted on 6/8  Also R IJ DVT noted on 6/19  Argatroban per Heme recs - transitioned to heparin 6/10  HELD 6/11 in setting of retroperitoneal bleed  IVC filter placed 6/14  Unable to anticoagulate due to continued pancytopenia/thrombocytopenia    Deep vein thrombosis (DVT) of femoral vein of right lower extremity  Nonocclusive right proximal femoral vein DVT  Noted on 6/8  Argatroban per Heme recs - transitioned to heparin  HELD 6/11 in setting of hypovolemic shock 2/2 retroperitoneal hemorrhage   IVC filter placed 6/14  Unable to anticoagulate due to continued pancytopenia/thrombocytopenia    Thrombocytopenia  Likely secondary to hepatic cirrhosis and development of splenomegaly  Hematology consulted, tested negative for HIT    6/11 --> acute hypotension, hypoxia --> hypovolemic shock --> Large R peritoneal hematoma --> IR for class A embo --> s/p IR embolization of left lumbar and internal iliac branch foci of arterial extravasation    Plts continue to trending down despite transfusion, however, no active bleeding. Will transfuse if plt < 25, or < 50 with active bleeding    Endocrine  Hyperammonemia  Ammonia WNL  Continue maintenance lactulose     Severe protein-calorie malnutrition  Decreased intake due to dysphagia  Diet texture per SLP  Nutrition following    GI  Acute liver failure without  hepatic coma  Cont spironolactone and lasix  Bili downtrending  INR improving    Hepatic cirrhosis  Ammonia normalized   lfts unremarkable  Bili downtrending  Coags/inr mildly abnormal  Continued encephalopathy      Cont rifaximin  Spironolactone, monitor K level, replaced prn    The patient is being Prophylaxed for:  Venous Thromboembolism with: Mechanical  Stress Ulcer with: Not Applicable   Ventilator Pneumonia with: not applicable    Activity Orders          Diet Dysphagia Mechanical Soft (IDDSI Level 5): Dysphagia 2 (Mechanical Soft Ground) starting at 06/26 1129    Discontinue arterial line starting at 06/18 1927    Elevate HOB flat until bedrest complete starting at 06/11 1641    Turn patient starting at 05/28 2253    Progressive Mobility Protocol (mobilize patient to their highest level of functioning at least twice daily) starting at 05/28 2000        Full Code     Level III    Amy Naylor NP  Neurocritical Care  [facility] - Neuro Critical Care

## 2023-06-28 NOTE — PLAN OF CARE
[facility] Care Plan    POC reviewed with Marely Hamilton and family at 1400. Pt verbalized understanding. Questions and concerns addressed. No acute events today. Pt progressing toward goals. Will continue to monitor. See below and flowsheets for full assessment and VS info.     Transfer orders in for hospital medicine  Q4 neuro checks  One time dose 20 mg of lasix   Low appetite  Helped patient with self care  Weaned off O2 now satting well on room air  PRN pain meds given         Is this a stroke patient? no    Neuro:  [place] Coma Scale  Best Eye Response: 4-->(E4) spontaneous  Best Motor Response: 6-->(M6) obeys commands  Best Verbal Response: 4-->(V4) confused  [place] Coma Scale Score: 14  Assessment Qualifiers: patient not sedated/intubated, no eye obstruction present  Pupil PERRLA: no     24 hr Temp:  [97.8 °F (36.6 °C)-98.1 °F (36.7 °C)]     CV:   Rhythm: normal sinus rhythm  BP goals:   SBP < 180  MAP > 65    Resp:      Vent Mode: A/C  Set Rate: 14 BPM  Oxygen Concentration (%): 30  Vt Set: 300 mL  PEEP/CPAP: 5 cmH20  Pressure Support: 5 cmH20    Plan: N/A    GI/:     Diet/Nutrition Received: mechanical/dental soft  Last Bowel Movement: 06/21/23  Voiding Characteristics: external catheter    Intake/Output Summary (Last 24 hours) at 6/28/2023 1448  Last data filed at 6/28/2023 1401  Gross per 24 hour   Intake 350 ml   Output 4050 ml   Net -3700 ml     Unmeasured Output  Urine Occurrence: 1  Stool Occurrence: 0  Emesis Occurrence: 0  Pad Count: 1    Labs/Accuchecks:  Recent Labs   Lab 06/28/23  0230   WBC 4.01   RBC 2.58*   HGB 7.9*   HCT 25.4*   PLT 46*      Recent Labs   Lab 06/28/23  0230      K 3.9   CO2 22*   *   BUN 6   CREATININE 0.4*   ALKPHOS 125   ALT 19   AST 36   BILITOT 6.8*      Recent Labs   Lab 06/28/23  0230   INR 1.9*    No results for input(s): CPK, CPKMB, TROPONINI, MB in the last 168 hours.    Electrolytes: N/A - electrolytes WDL  Accuchecks:  none    Gtts:      LDA/Wounds:  Lines/Drains/Airways       Drain  Duration             Female External Urinary Catheter 06/22/23 0801 6 days              Peripheral Intravenous Line  Duration                  Peripheral IV - Single Lumen 06/15/23 1229 20 G;1 3/4 in Right Lower leg 13 days         Peripheral IV - Single Lumen 06/21/23 0012 20 G Anterior;Left Upper Arm 7 days                  Wounds: Yes  Wound care consulted: Yes

## 2023-06-28 NOTE — ASSESSMENT & PLAN NOTE
58F with EtOH induced hepatic cirrhosis, seizure disorder on outpatient LEV and ZNS and bipolar disorder admitted on 5/28 for persistent encephalopathy.    - UTI on admit (Proteus mirabilis), now s/p CTX course  - Hypercalcemia 2/2 lithium toxicity (Lithium changed to Abilify per Psych)  - Hepatic encephalopathy 2/2 to medication non compliance, treated with lactulose and rifaximin     MRI Brain WO was unremarkable for acute neurologic change  EEG w/ frequent left hemispheric electrographic seizures and NCSE  Repeat EEGs without seizure activity x 24 hours, now resolved    6/11 --> acute hypotension, hypoxia --> hypovolemic shock --> Large R peritoneal hematoma --> IR for class A embo --> s/p IR embolization of left lumbar and internal iliac branch foci of arterial extravasation  6/13 --> episode of hypoxia and SOB --> CTA PE without evidence of PE, CT C/A/P without new bleed    - Continued admission to [facility]  - Q4h neurochecks while in ICU  - Q1h vitals while in ICU  - HOB@30  - Keppra 500mg BID  - Thiamine replacement   - Lactulose, titrate to BM  - MAP>65  - Daily CMP, Mag, Phos - replete electrolytes PRN  - Lipid panel, A1c, Coags reviewed  - Daily CBC, transfuse PRN and replace Plts <25  - Daily fibrinogen, PT/INR  - Heparin held in setting of acute bleed/DIC, IVC filter in place  - Keppra 500mg BID, cEEG without captured seizure 6/22   - PT/OT/SLP as appropriate  - CM/SW consult for dispo planning

## 2023-06-29 LAB
ALBUMIN SERPL BCP-MCNC: 2.9 G/DL (ref 3.5–5.2)
ALP SERPL-CCNC: 155 U/L (ref 55–135)
ALT SERPL W/O P-5'-P-CCNC: 22 U/L (ref 10–44)
ANION GAP SERPL CALC-SCNC: 9 MMOL/L (ref 8–16)
AST SERPL-CCNC: 42 U/L (ref 10–40)
BASOPHILS # BLD AUTO: 0.01 K/UL (ref 0–0.2)
BASOPHILS NFR BLD: 0.2 % (ref 0–1.9)
BILIRUB SERPL-MCNC: 8.5 MG/DL (ref 0.1–1)
BUN SERPL-MCNC: 7 MG/DL (ref 6–20)
CALCIUM SERPL-MCNC: 9.1 MG/DL (ref 8.7–10.5)
CHLORIDE SERPL-SCNC: 107 MMOL/L (ref 95–110)
CO2 SERPL-SCNC: 24 MMOL/L (ref 23–29)
CREAT SERPL-MCNC: 0.4 MG/DL (ref 0.5–1.4)
DIFFERENTIAL METHOD: ABNORMAL
EOSINOPHIL # BLD AUTO: 0 K/UL (ref 0–0.5)
EOSINOPHIL NFR BLD: 0.4 % (ref 0–8)
ERYTHROCYTE [DISTWIDTH] IN BLOOD BY AUTOMATED COUNT: 28.4 % (ref 11.5–14.5)
EST. GFR  (NO RACE VARIABLE): >60 ML/MIN/1.73 M^2
GLUCOSE SERPL-MCNC: 114 MG/DL (ref 70–110)
HCT VFR BLD AUTO: 28.2 % (ref 37–48.5)
HGB BLD-MCNC: 8.9 G/DL (ref 12–16)
IMM GRANULOCYTES # BLD AUTO: 0.03 K/UL (ref 0–0.04)
IMM GRANULOCYTES NFR BLD AUTO: 0.5 % (ref 0–0.5)
INR PPP: 1.9 (ref 0.8–1.2)
LYMPHOCYTES # BLD AUTO: 0.8 K/UL (ref 1–4.8)
LYMPHOCYTES NFR BLD: 15.2 % (ref 18–48)
MAGNESIUM SERPL-MCNC: 1.6 MG/DL (ref 1.6–2.6)
MCH RBC QN AUTO: 31.3 PG (ref 27–31)
MCHC RBC AUTO-ENTMCNC: 31.6 G/DL (ref 32–36)
MCV RBC AUTO: 99 FL (ref 82–98)
MONOCYTES # BLD AUTO: 0.6 K/UL (ref 0.3–1)
MONOCYTES NFR BLD: 11.3 % (ref 4–15)
NEUTROPHILS # BLD AUTO: 4 K/UL (ref 1.8–7.7)
NEUTROPHILS NFR BLD: 72.4 % (ref 38–73)
NRBC BLD-RTO: 0 /100 WBC
PHOSPHATE SERPL-MCNC: 2.6 MG/DL (ref 2.7–4.5)
PLATELET # BLD AUTO: 49 K/UL (ref 150–450)
PMV BLD AUTO: 10.4 FL (ref 9.2–12.9)
POTASSIUM SERPL-SCNC: 3.9 MMOL/L (ref 3.5–5.1)
PROT SERPL-MCNC: 6.2 G/DL (ref 6–8.4)
PROTHROMBIN TIME: 19.8 SEC (ref 9–12.5)
RBC # BLD AUTO: 2.84 M/UL (ref 4–5.4)
SODIUM SERPL-SCNC: 140 MMOL/L (ref 136–145)
WBC # BLD AUTO: 5.47 K/UL (ref 3.9–12.7)

## 2023-06-29 PROCEDURE — 25000003 PHARM REV CODE 250: Performed by: NURSE PRACTITIONER

## 2023-06-29 PROCEDURE — 92507 TX SP LANG VOICE COMM INDIV: CPT

## 2023-06-29 PROCEDURE — 94660 CPAP INITIATION&MGMT: CPT

## 2023-06-29 PROCEDURE — 80053 COMPREHEN METABOLIC PANEL: CPT

## 2023-06-29 PROCEDURE — 99233 PR SUBSEQUENT HOSPITAL CARE,LEVL III: ICD-10-PCS | Mod: ,,, | Performed by: INTERNAL MEDICINE

## 2023-06-29 PROCEDURE — 99233 SBSQ HOSP IP/OBS HIGH 50: CPT | Mod: ,,, | Performed by: INTERNAL MEDICINE

## 2023-06-29 PROCEDURE — 99900035 HC TECH TIME PER 15 MIN (STAT)

## 2023-06-29 PROCEDURE — 97535 SELF CARE MNGMENT TRAINING: CPT

## 2023-06-29 PROCEDURE — 25000003 PHARM REV CODE 250: Performed by: PSYCHIATRY & NEUROLOGY

## 2023-06-29 PROCEDURE — 36415 COLL VENOUS BLD VENIPUNCTURE: CPT | Performed by: NURSE PRACTITIONER

## 2023-06-29 PROCEDURE — 97530 THERAPEUTIC ACTIVITIES: CPT | Mod: CO

## 2023-06-29 PROCEDURE — 94761 N-INVAS EAR/PLS OXIMETRY MLT: CPT

## 2023-06-29 PROCEDURE — 94668 MNPJ CHEST WALL SBSQ: CPT

## 2023-06-29 PROCEDURE — 25000003 PHARM REV CODE 250: Performed by: PHYSICIAN ASSISTANT

## 2023-06-29 PROCEDURE — 85025 COMPLETE CBC W/AUTO DIFF WBC: CPT | Performed by: NURSE PRACTITIONER

## 2023-06-29 PROCEDURE — 20600001 HC STEP DOWN PRIVATE ROOM

## 2023-06-29 PROCEDURE — 85610 PROTHROMBIN TIME: CPT | Performed by: NURSE PRACTITIONER

## 2023-06-29 PROCEDURE — 97535 SELF CARE MNGMENT TRAINING: CPT | Mod: CO

## 2023-06-29 PROCEDURE — 83735 ASSAY OF MAGNESIUM: CPT

## 2023-06-29 PROCEDURE — 92526 ORAL FUNCTION THERAPY: CPT

## 2023-06-29 PROCEDURE — 27000221 HC OXYGEN, UP TO 24 HOURS

## 2023-06-29 PROCEDURE — 84100 ASSAY OF PHOSPHORUS: CPT

## 2023-06-29 RX ADMIN — LACTULOSE 15 G: 20 SOLUTION ORAL at 09:06

## 2023-06-29 RX ADMIN — FUROSEMIDE 40 MG: 40 TABLET ORAL at 05:06

## 2023-06-29 RX ADMIN — MUPIROCIN: 20 OINTMENT TOPICAL at 09:06

## 2023-06-29 RX ADMIN — OLANZAPINE 15 MG: 7.5 TABLET, FILM COATED ORAL at 10:06

## 2023-06-29 RX ADMIN — LEVETIRACETAM 1000 MG: 500 SOLUTION ORAL at 10:06

## 2023-06-29 RX ADMIN — RIFAXIMIN 550 MG: 550 TABLET ORAL at 10:06

## 2023-06-29 RX ADMIN — FUROSEMIDE 40 MG: 40 TABLET ORAL at 09:06

## 2023-06-29 RX ADMIN — HYDROXYZINE HYDROCHLORIDE 50 MG: 25 TABLET, FILM COATED ORAL at 11:06

## 2023-06-29 RX ADMIN — RIFAXIMIN 550 MG: 550 TABLET ORAL at 09:06

## 2023-06-29 RX ADMIN — LACTULOSE 15 G: 20 SOLUTION ORAL at 10:06

## 2023-06-29 RX ADMIN — SPIRONOLACTONE 50 MG: 25 TABLET, FILM COATED ORAL at 09:06

## 2023-06-29 RX ADMIN — LACTULOSE 15 G: 20 SOLUTION ORAL at 03:06

## 2023-06-29 RX ADMIN — BACITRACIN: 500 OINTMENT TOPICAL at 03:06

## 2023-06-29 RX ADMIN — BACITRACIN 1 EACH: 500 OINTMENT TOPICAL at 10:06

## 2023-06-29 RX ADMIN — LEVETIRACETAM 1000 MG: 500 SOLUTION ORAL at 09:06

## 2023-06-29 RX ADMIN — BACITRACIN: 500 OINTMENT TOPICAL at 10:06

## 2023-06-29 RX ADMIN — TRAZODONE HYDROCHLORIDE 50 MG: 50 TABLET ORAL at 10:06

## 2023-06-29 RX ADMIN — SILODOSIN 4 MG: 4 CAPSULE ORAL at 09:06

## 2023-06-29 NOTE — CONSULTS
[facility] - Telemetry Stepdown  Wound Care    Patient Name:  Marely Hamilton   MRN:  [id number]  Date: 6/29/2023  Diagnosis: Non-convulsive status epilepticus    History:     Past Medical History:   Diagnosis Date    Addiction to drug     Alcohol abuse     Alcohol abuse, in remission 6/15/2015    14.5 weeks ago; AA weekly    Anemia     Anxiety 6/15/2015    Behavioral problem     Bipolar disorder     Bipolar disorder in remission 6/15/2015    Cirrhosis, Laennec's 6/15/2015    Depression     Encounter for blood transfusion     Epistaxis 6/15/2015    Fatigue     Glaucoma     Hematuria     Hepatic encephalopathy 6/15/2015    Hepatic enlargement     History of psychiatric care     History of psychiatric hospitalization     History of seizure 6/15/2015    1    hx of intentional Tylenol overdose 6/15/2015    2005- situational and hx of bipolar    Hx of psychiatric care     Macrocytic anemia 9/18/2015    6 units PRBC since June 2015    Jeana     Osteoarthritis     Other ascites 6/15/2015    Psychiatric exam requested by authority     Psychiatric problem     Psychosis 9/26/2019    Renal disorder     Seizures     Self-harming behavior     Suicide attempt     Therapy     Thrombocytopenia 6/15/2015       Social History     Socioeconomic History    Marital status: Single   Occupational History    Occupation: Disabled     Employer: DISABLED   Tobacco Use    Smoking status: Never    Smokeless tobacco: Never   Substance and Sexual Activity    Alcohol use: Not Currently     Comment: hx of ETOH abuse with cirrhosis of liver    Drug use: No    Sexual activity: Not Currently   Other Topics Concern    Patient feels they ought to cut down on drinking/drug use No    Patient annoyed by others criticizing their drinking/drug use No    Patient has felt bad or guilty about drinking/drug use No    Patient has had a drink/used drugs as an eye opener in the AM No   Social History Narrative    Pt has 1 older and 1 younger sister.  Her father  committed suicide when she was 18.  She has a [employer] degree and worked as an , but she has been disabled since age 40.  She never  and has no children.  She is not dating and claims no current friends.  She currently lives with her mother along with her pet dog.  She denies any hobbies and says she is not Yarsanism.     Social Determinants of Health     Financial Resource Strain: Unknown    Difficulty of Paying Living Expenses: Patient refused   Food Insecurity: Unknown    Worried About Running Out of Food in the Last Year: Patient refused    Ran Out of Food in the Last Year: Patient refused   Transportation Needs: Unknown    Lack of Transportation (Medical): Patient refused    Lack of Transportation (Non-Medical): Patient refused   Physical Activity: Unknown    Days of Exercise per Week: Patient refused    Minutes of Exercise per Session: Patient refused   Stress: Unknown    Feeling of Stress : Patient refused   Social Connections: Unknown    Frequency of Communication with Friends and Family: Patient refused    Frequency of Social Gatherings with Friends and Family: Patient refused    Attends Hoahaoism Services: Patient refused    Active Member of Clubs or Organizations: Patient refused    Attends Club or Organization Meetings: Patient refused    Marital Status: Patient refused   Housing Stability: Unknown    Unable to Pay for Housing in the Last Year: Patient refused    Number of Places Lived in the Last Year: 1    Unstable Housing in the Last Year: Patient refused       Precautions:     Allergies as of 05/27/2023 - Reviewed 05/21/2023   Allergen Reaction Noted    Sulfa (sulfonamide antibiotics) Rash 11/26/2014    Codeine Nausea And Vomiting 04/26/2018       [facility] Assessment Details/Treatment     Patient seen for wound care consultation for multiple wounds.   Reviewed chart for this encounter.   See Flow Sheet for findings.    Pt well known to wound care service for continued care during this admission.  Pt found lying in bed, agreeable to care at this time. Pt continues to have large amounts of bruising along arms and trunk w/ scattered skin tears. No open skin tears at this time aside from a partial thickness skin tear mid sternal - moist and bleeding slightly. Xeroform and Island dressing applied. Diaper removed (orders state no diapers) To BL groin, gluteal cleft and sacrum, pt has continued moisture derived breakdown w/ scattered areas of partial thickness tissue loss - Triad applied. Wounds to head/face are resurfaced scabs, no intervention needed at this time, Waffle mattress, heel lift boots ordered.      RECOMMENDATIONS:  Continue wound care as ordered previously.  Bedside RN to apply waffle mattress, heel lift boots.    Discussed POC with patient and primary nurse.   See EMR for orders & patient education.    Discussed nutrition and the role of protein in wound healing with the patient. Instructed patient to optimize protein for wound healing.    Nursing to continue care.  Nursing to maintain pressure injury prevention interventions.           06/29/23 1520   WOCN Assessment   WOCN Total Time (mins) 15   Visit Date 06/29/23   Visit Time 1520   Consult Type New;Follow Up   WOCN Speciality Wound   Intervention changed;assessed;applied;chart review;coordination of care   Teaching on-going        Altered Skin Integrity 06/03/23 1300  Groin Moisture associated dermatitis   Date First Assessed/Time First Assessed: 06/03/23 1300   Altered Skin Integrity Present on Admission - Did Patient arrive to the hospital with altered skin?: yes  Side: (c)   Location: Groin  Is this injury device related?: No  Primary Wound Type: Bobby...   Wound Image    Dressing Appearance Open to air   Drainage Amount None   Drainage Characteristics/Odor No odor   Appearance Pink;Red;Moist   Tissue loss description Partial thickness   Wound Edges Irregular   Care Cleansed with:;Soap and water;Applied:;Skin Barrier        Altered Skin  Integrity 06/05/23 1154 Left posterior Greater trochanter Moisture associated dermatitis   Date First Assessed/Time First Assessed: 06/05/23 1154   Altered Skin Integrity Present on Admission - Did Patient arrive to the hospital with altered skin?: suspected hospital acquired  Side: Left  Orientation: posterior  Location: Greater trochanter...   Dressing Appearance Open to air   Drainage Amount None   Drainage Characteristics/Odor No odor   Appearance Pink;Red   Tissue loss description Partial thickness   Periwound Area [place];Moist   Care Applied:;Skin Barrier        Altered Skin Integrity 06/05/23 1154 Right posterior Greater trochanter Moisture associated dermatitis   Date First Assessed/Time First Assessed: 06/05/23 1154   Altered Skin Integrity Present on Admission - Did Patient arrive to the hospital with altered skin?: suspected hospital acquired  Side: Right  Orientation: posterior  Location: Greater trochante...   Wound Image    Dressing Appearance Open to air   Drainage Amount None   Drainage Characteristics/Odor No odor   Appearance Pink;Red;Moist   Tissue loss description Partial thickness   Periwound Area [place];Moist   Care Applied:;Skin Barrier        Altered Skin Integrity 06/27/23 0506 upper Sternal Skin Tear   Date First Assessed/Time First Assessed: 06/27/23 0506   Altered Skin Integrity Present on Admission - Did Patient arrive to the hospital with altered skin?: suspected hospital acquired  Orientation: upper  Location: Sternal  Primary Wound Type: Skin ...   Wound Image    Dressing Appearance Intact;Moist drainage   Drainage Amount Small   Drainage Characteristics/Odor Sanguineous   Appearance Red;Bleeding   Tissue loss description Partial thickness   Red (%), Wound Tissue Color 100 %   Wound Edges Irregular   Care Cleansed with:;Sterile normal saline   Dressing Applied;Non-adherent;Island/border        Altered Skin Integrity 06/29/23 1523 Sacral spine Moisture associated dermatitis   Date First  Assessed/Time First Assessed: 06/29/23 1523   Location: Sacral spine  Primary Wound Type: Moisture associated dermatitis   Wound Image    Dressing Appearance Open to air   Drainage Amount None   Drainage Characteristics/Odor No odor   Appearance Red;Pink;Moist   Tissue loss description Partial thickness   Periwound Area Intact;Moist;Pink   Wound Edges Irregular   Care Applied:;Skin Barrier

## 2023-06-29 NOTE — PROGRESS NOTES
[facility] - Telemetry Stepdown  Adult Nutrition  Progress Note    SUMMARY       Recommendations    1. Continue Dysphagia soft diet, texture per SLP.   2. Add Boost Plus ONS & encourage PO intake as tolerated.   3. RD to monitor & follow-up.    Goals: Meet % EEN by RD f/u.  Nutrition Goal Status: progressing towards goal  Communication of RD Recs: other (comment) (POC)    Assessment and Plan    Severe protein-calorie malnutrition    Malnutrition Type:  Context: social/environmental circumstances  Level: severe    Related to (etiology):   H/o ETOH abuse     Signs and Symptoms (as evidenced by):   Findings of NFPE, wt loss, edema present, and inadequate protein-energy intake     Malnutrition Characteristic Summary:  Weight Loss (Malnutrition):  (23% x 4 months)  Energy Intake (Malnutrition): less than or equal to 75% for greater than or equal to 1 month  Subcutaneous Fat (Malnutrition): severe depletion (suspecting)  Muscle Mass (Malnutrition): severe depletion  Fluid Accumulation (Malnutrition): other (see comments) (mild-moderate)    Interventions/Recommendations (treatment strategy):  Collaboration of nutrition care w/ other providers  ONS    Nutrition Diagnosis Status:   Continues    Malnutrition Assessment    Malnutrition Context: social/environmental circumstances  Malnutrition Level: severe    Weight Loss (Malnutrition):  (23% x 4 months)  Energy Intake (Malnutrition): less than or equal to 75% for greater than or equal to 1 month  Subcutaneous Fat (Malnutrition): severe depletion (suspecting)  Muscle Mass (Malnutrition): severe depletion  Fluid Accumulation (Malnutrition): other (see comments) (mild-moderate)   Orbital Region (Subcutaneous Fat Loss): severe depletion  Upper Arm Region (Subcutaneous Fat Loss): severe depletion   Restorationism Region (Muscle Loss): severe depletion  Clavicle Bone Region (Muscle Loss): severe depletion  Clavicle and Acromion Bone Region (Muscle Loss): severe depletion  Scapular Bone  "Region (Muscle Loss): severe depletion  Patellar Region (Muscle Loss): severe depletion  Anterior Thigh Region (Muscle Loss): severe depletion  Posterior Calf Region (Muscle Loss): severe depletion     Reason for Assessment    Reason For Assessment: RD follow-up  Diagnosis: other (see comments) (Non-convulsive status epilepticus)  Relevant Medical History: addiction to drugs, ETOH abuse, anemia, anxiety, behavioral disorder, bipolar disorder, cirrhosis, hypotension, glaucoma, hx of seizure  Interdisciplinary Rounds: did not attend    General Information Comments: Extubated 6/23, diet advanced per SLP & TFs discontinued. Disoriented w/ no family at bedside this AM. Per RN documentation, pt tolerating diet w/ 25-50% PO intake. +Weight loss & inadequate energy intake PTA. Severe malnutrition diagnosis continues. Please see PES statement for details. NFPE updated today.  Nutrition Discharge Planning: Pending clinical course    Nutrition/Diet History    Spiritual, Cultural Beliefs, Church Practices, Values that Affect Care: no  Food Allergies: NKFA  Factors Affecting Nutritional Intake: decreased appetite    Anthropometrics    Temp: 98.1 °F (36.7 °C)  Height: 5' 2" (157.5 cm)  Height (inches): 62 in  Weight Method: Bed Scale  Weight: 59.9 kg (132 lb 0.9 oz)  Weight (lb): 132.06 lb  Ideal Body Weight (IBW), Female: 110 lb  % Ideal Body Weight, Female (lb): 120.05 %  BMI (Calculated): 24.1  BMI Grade: 18.5-24.9 - normal    Lab/Procedures/Meds    Pertinent Labs Reviewed: reviewed  Pertinent Labs Comments: -  Pertinent Medications Reviewed: reviewed  Pertinent Medications Comments: Lactulose    Estimated/Assessed Needs    Weight Used For Calorie Calculations: 60 kg (132 lb 4.4 oz)    Energy Calorie Requirements (kcal): 1800 kcal/d  Energy Need Method: Kcal/kg (30 kcal/kg)    Protein Requirements: 78 g/d (1.3 g/kg)  Weight Used For Protein Calculations: 60 kg (132 lb 4.4 oz)    Estimated Fluid Requirement Method: RDA " Method  RDA Method (mL): 1800    Nutrition Prescription Ordered    Current Diet Order: Dysphagia soft  Current Nutrition Support Formula Ordered: Other (Comment) (Discontinued)    Evaluation of Received Nutrient/Fluid Intake    I/O: -12.3L since 6/15    Comments: LBM: 6/28    Tolerance: tolerating    Nutrition Risk    Level of Risk/Frequency of Follow-up:  (1 time/week)     Monitor and Evaluation    Food and Nutrient Intake: food and beverage intake, energy intake  Food and Nutrient Adminstration: diet order  Knowledge/Beliefs/Attitudes: food and nutrition knowledge/skill, beliefs and attitudes  Physical Activity and Function: nutrition-related ADLs and IADLs  Anthropometric Measurements: weight, weight change  Biochemical Data, Medical Tests and Procedures: electrolyte and renal panel, gastrointestinal profile, glucose/endocrine profile, inflammatory profile, lipid profile  Nutrition-Focused Physical Findings: overall appearance, extremities, muscles and bones, head and eyes, skin     Nutrition Follow-Up    RD Follow-up?: Yes

## 2023-06-29 NOTE — PT/OT/SLP PROGRESS
Physical Therapy      Patient Name:  Marely Hamilton   MRN:  [id number]    Patient not seen today secondary to  (wound care nursing team treating pt.). Will follow-up tomorrow.    Driss Wynn, PT  6/29/2023

## 2023-06-29 NOTE — PLAN OF CARE
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, Laennec's  No date: Depression  No date: Encounter for blood transfusion  6/15/2015: Epistaxis  No date: Fatigue  No date: Glaucoma  No date: Hematuria  6/15/2015: Hepatic encephalopathy  No date: Hepatic enlargement  No date: History of psychiatric care  No date: History of psychiatric hospitalization  6/15/2015: History of seizure      Comment:  1  6/15/2015: hx of intentional Tylenol overdose      Comment:  2005- situational and hx of bipolar  No date: Hx of psychiatric care  9/18/2015: Macrocytic anemia      Comment:  6 units PRBC since June 2015  No date: Jeana  No date: Osteoarthritis  6/15/2015: Other ascites  No date: Psychiatric exam requested by authority  No date: Psychiatric problem  9/26/2019: Psychosis  No date: Renal disorder  No date: Seizures  No date: Self-harming behavior  No date: Suicide attempt  No date: Therapy  6/15/2015: Thrombocytopenia    Past Surgical History:  No date: COSMETIC SURGERY  No date: ESOPHAGOGASTRODUODENOSCOPY    Individualization:   1. Patient likes to listen to music.    Restraints:   1. Bilateral soft wrist restraints initiated 6/6/23 1930 for pulling of lines. [facility] for DC criteria.         Outcome: Ongoing, Progressing  Goal: Absence of Hospital-Acquired Illness or Injury  Outcome: Ongoing, Progressing  Goal: Optimal Comfort and Wellbeing  Outcome: Ongoing, Progressing  Goal: Readiness for Transition of Care  Outcome: Ongoing, Progressing     Problem: Fluid and  Electrolyte Imbalance (Acute Kidney Injury/Impairment)  Goal: Fluid and Electrolyte Balance  Outcome: Ongoing, Progressing     Problem: Oral Intake Inadequate (Acute Kidney Injury/Impairment)  Goal: Optimal Nutrition Intake  Outcome: Ongoing, Progressing     Problem: Renal Function Impairment (Acute Kidney Injury/Impairment)  Goal: Effective Renal Function  Outcome: Ongoing, Progressing     Problem: Infection  Goal: Absence of Infection Signs and Symptoms  Outcome: Ongoing, Progressing     Problem: Skin Injury Risk Increased  Goal: Skin Health and Integrity  Outcome: Ongoing, Progressing     Problem: Fall Injury Risk  Goal: Absence of Fall and Fall-Related Injury  Outcome: Ongoing, Progressing     Problem: Impaired Wound Healing  Goal: Optimal Wound Healing  Outcome: Ongoing, Progressing     Problem: Adjustment to Illness (Delirium)  Goal: Optimal Coping  Outcome: Ongoing, Progressing     Problem: Altered Behavior (Delirium)  Goal: Improved Behavioral Control  Outcome: Ongoing, Progressing     Problem: Attention and Thought Clarity Impairment (Delirium)  Goal: Improved Attention and Thought Clarity  Outcome: Ongoing, Progressing     Problem: Sleep Disturbance (Delirium)  Goal: Improved Sleep  Outcome: Ongoing, Progressing     Awake and alert. Only oriented to self. On room air. Pt remained free from injury or significant changes during shift. Bed alarm on. Bed in lowest position.

## 2023-06-29 NOTE — NURSING
Nursing Transfer Note       Transfer To [id number]      Transfer via bed    Transfer with  to O2, cardiac monitoring    Transported by Cameron CALVERT     Medicines sent: Yes    Chart sent with patient: Yes    Belongings sent with patient: none    Notified: sister     Bedside Neuro assessment performed: Yes    Bedside Handoff given to: Venus     Upon arrival to floor: cardiac monitor applied, patient oriented to room, call bell in reach, and bed in lowest position

## 2023-06-29 NOTE — PT/OT/SLP PROGRESS
Occupational Therapy   Treatment    Name: Marely Hamilton  MRN: [id number]  Admitting Diagnosis:  Non-convulsive status epilepticus  18 Days Post-Op  Procedure(s):  EMBOLIZATION, BLOOD VESSEL   Recommendations:     Discharge Recommendations: nursing facility, skilled  Discharge Equipment Recommendations:  to be determined by next level of care  Barriers to discharge:  Decreased caregiver support (increased skilled assistance required)    Assessment:     Marely Hamilton is a 58 y.o. female with a medical diagnosis of Non-convulsive status epilepticus.  She presents with the following performance deficits affecting function are weakness, impaired endurance, impaired self care skills, impaired functional mobility, gait instability, impaired balance, decreased safety awareness, decreased lower extremity function, decreased upper extremity function, impaired fine motor, decreased coordination, decreased ROM, impaired cardiopulmonary response to activity.     Patient agreeable to OT session and was participative throughout. Patient was primarily limited by global weakness and deconditioning today with c/o mild dizziness that subsided as therapy progressed EOB. Patient would benefit from continued OT services to address deficits and progress towards goals. Continue OT POC.    Rehab Prognosis:  Good; patient would benefit from acute skilled OT services to address these deficits and reach maximum level of function.       Plan:     Patient to be seen 3 x/week to address the above listed problems via self-care/home management, therapeutic activities, therapeutic exercises, neuromuscular re-education  Plan of Care Expires: 07/26/23  Plan of Care Reviewed with: patient    Subjective     Chief Complaint: c/o brightness of natural light 2/2 reporting having hx of glaucoma  Patient/Family Comments/goals: get stronger  Pain/Comfort:  Pain Rating 1: 0/10  Pain Rating Post-Intervention 1: 0/10    Objective:     Communicated with: nurse  frank and OTR prior to session.  Patient found HOB elevated with peripheral IV, telemetry, pulse ox (continuous), PureWick upon OT entry to room.  A client care conference was completed by the OTR and the SHEPARD prior to treatment by the SHEPARD to discuss the patient's POC and current status.    General Precautions: Standard, fall, aspiration    Orthopedic Precautions:N/A  Braces: N/A  Respiratory Status: Room air     Occupational Performance:     Bed Mobility:    Patient completed Rolling/Turning to Left with  moderate assistance and with side rail  Patient completed Rolling/Turning to Right with maximal assistance and with side rail  Patient completed Scooting anteriorly to EOB AND scooting in supine towards HOB with total assistance  Patient completed Supine to Sit with maximal assistance, with side rail, and HOB elevated with trunk and BLE management  Patient completed Sit to Supine with moderate assistance and HOB flat with BLE and trunk support     Functional Mobility/Transfers:  Patient completed Sit <> Stand Transfer with total assistance  with  no assistive device ; unable to clear hips; patient initiated initially, but then deferred muscle activation 2/2 reporting increased fear  Functional Mobility: pt tolerated ~12min seated EOB with CGA progressed to SBA for static sitting balance    Activities of Daily Living:  Lower Body Dressing: dependence do R sock  Toileting: dependence Pt received soiled requiring perineal hygiene. Nurse notified pt requires new purewick  Feeding: contact guard assistance/minimum assistance to bring ice water to mouth seated EOB with min cues for pacing       AMPAC 6 Click ADL: 11    Treatment & Education:  Patient educated on OT POC, goals, and current progress.    Patient educated on use of room telephone requiring Max(A) 2/2 increased tremor for FM task and poor recall for sequencing through task.    Functional transfer training    Addressed all patient questions/concerns within  SHEPARD scope of practice.     Patient left HOB elevated with all lines intact, call button in reach, and nurse notified    GOALS:   Multidisciplinary Problems       Occupational Therapy Goals          Problem: Occupational Therapy    Goal Priority Disciplines Outcome Interventions   Occupational Therapy Goal     OT, PT/OT Ongoing, Progressing    Description: Goals to be met by: 7/26/23    Patient will increase functional independence with ADLs by performing:    Grooming while EOB with Minimum Assistance.  UE Dressing with Minimum Assistance.  LB Dressing with Moderate Assistance.  Toileting from toilet/bedside commode with Moderate Assistance.   Sitting at edge of bed x10 minutes with Minimal Assistance.  Rolling to Bilateral with Minimal Assistance.   Supine to sit with Moderate Assistance.  Toilet transfer to toilet/bedside commode with Moderate Assistance and LRAD as needed.                         Time Tracking:     OT Date of Treatment: 06/29/23  OT Start Time: 1442  OT Stop Time: 1512  OT Total Time (min): 30 min    Billable Minutes:Self Care/Home Management 15  Therapeutic Activity 15    OT/DC: DC     Number of DC visits since last OT visit: 1    6/29/2023

## 2023-06-29 NOTE — NURSING
Nurses Note -- 4 Eyes      6/29/2023   5:59 AM      Skin assessed during: Transfer      [] No Altered Skin Integrity Present    []Prevention Measures Documented      [x] Yes- Altered Skin Integrity Present or Discovered   [] LDA Added if Not in Epic (Describe Wound)   [] New Altered Skin Integrity was Present on Admit and Documented in LDA   [x] Wound Image Taken    Wound Care Consulted? Yes    Attending Nurse:  Lynda Aden RN     Second RN/Staff Member:  Anshu Mullen RN

## 2023-06-29 NOTE — PLAN OF CARE
Spoke with patient's sister, Zaria, who reports she will return call to  as soon as possible.    Need MD signature on PASRR. MD has been notified.    3:06 PM  Per report, patient's sister is requesting [facility] as first choice for SNF. Spoke with Amy in admissions at [facility] who reports they are med-psych, not SNF. While they do offer some PT/OT services, [facility] is primarily inpatient psych that is able to meet medical needs of patients. Amy states in order for patients to admit, they must be under a PEC/CEC or voluntary admit.    Spoke with MD and requested for MD to call patient's sister with a medical update if possible.    4:17 PM  Spoke with patient's sister, Zaria, regarding dc planning. Zaria reports first choice for placement is [facility] as patient has been there in the past and benefited greatly from their services. Explained the above admit process per admissions at [facility]. Second choice for placement if [facility] is not possible would be [facility]. Zaria reports patient has also been to [facility] in the past and had a good experience. Per Zaria, it is very important for patient to have psych or MD following for psychiatric medications. Spoke with [facility] who report not beds available this week. Per [facility], will need to ensure family understands it's possible patient may be dc'd within a week and will need a dc plan in place. Discussed this with Zaria who states as long as she is given at least a 2 day notice of the dc date, she will arrive to bring patient home and engage previous sitter service, [facility] (not the ). Updated [facility].    Spoke with MD and obtained signature needed on PASRR. MD reports will try to call aZria later today.    Faxed MD signed PASRR along with supporting clinicals.    [facility] - Telemetry Stepdown  Discharge Reassessment    Primary Care Provider: Viktor Ross MD    Expected Discharge Date: 7/3/2023    Reassessment (most recent)        Discharge Reassessment - 06/29/23 1630          Discharge Reassessment    Assessment Type Discharge Planning Reassessment     Did the patient's condition or plan change since previous assessment? No     Discharge Plan discussed with: Sibling     Discharge Plan A Skilled Nursing Facility;Other   family prefers [facility] if at all possible. otherwise, [facility]    Discharge Plan B Skilled Nursing Facility     DME Needed Upon Discharge  none     Transition of Care Barriers None     Why the patient remains in the hospital Requires continued medical care        Post-Acute Status    Post-Acute Authorization Placement     Post-Acute Placement Status Referrals Sent     Discharge Delays None known at this time                 Mirela Cintron LCSW  [facility]  Ext. [phone]

## 2023-06-29 NOTE — PT/OT/SLP PROGRESS
"Speech Language Pathology Treatment    Patient Name:  Marely Hamilton   MRN:  [id number]  Admitting Diagnosis: Non-convulsive status epilepticus    Recommendations:                 General Recommendations:  Dysphagia therapy and Cognitive-linguistic therapy  Diet recommendations:  Mechanical soft, Liquid Diet Level: Thin   Aspiration Precautions: 1 bite/sip at a time, Assistance with meals, Avoid talking while eating, Eliminate distractions, Feed only when awake/alert, HOB to 90 degrees, Meds crushed in puree, No straws, Small bites/sips, and Strict aspiration precautions   General Precautions: Standard, aspiration, fall  Communication strategies:  provide increased time to answer and go to room if call light pushed    Assessment:     Marely Hamilton is a 58 y.o. female with an SLP diagnosis of Dysphagia, Cognitive-Linguistic Impairment, and Dysphonia.      Subjective     "It's like I can't focus."     Pain/Comfort:  Pain Rating 1: 0/10  Pain Rating Post-Intervention 1: 0/10    Respiratory Status: Room air    Objective:     Has the patient been evaluated by SLP for swallowing?   Yes  Keep patient NPO? No     Pt seen bedside, alert and cooperative.  Continued mild dysphonia evident.  Pt able to orient to self, place, and partial situation.  Decreased sustained attn to task with increased time to complete functional problem solving.  Oral care completed with set up by SLP.  Slight improvement in vocal intensity following oral care.  Breakfast tray at bedside.  Pt self feeding following set up by SLP.  Good rate of feeding of solids with small bites taken though large cyclical sips of thin liquids taken from cup.  Nurse at bedside providing liquid meds and whole medications with thin liquids.  Large cyclical sips taken from cup with medications with overt cough response x2.  Education provided re: importance of small sips though reinforcement required.  No overt s/s aspiration with small single sips of thin.  Education " provided re: safety considerations while hospitalized, cognitive stim, voice strategies, aspiration risk, s/s aspiration, diet recs, swallow precs, and POC.  Education to be ongoing.      Goals:   Multidisciplinary Problems       SLP Goals          Problem: SLP    Goal Priority Disciplines Outcome   SLP Goal     SLP Ongoing, Progressing   Description: Goals due 7/5  1.  Pt. Will participate in ongoing assessment of swallow to initiate least restrictive diet    2.  Assess functional reading and writing skills  3.  [place] to month, year and place  4.  Respond to verbal problem solving tasks with 80% accuracy with min cues  5.  REspond to categorization /organization tasks with 80% accuracy with mod cues                   Multidisciplinary Problems (Resolved)          Problem: SLP    Goal Priority Disciplines Outcome   SLP Goal   (Resolved)     SLP Met                       Plan:     Patient to be seen:  4 x/week   Plan of Care expires:  07/24/23  Plan of Care reviewed with:  patient   SLP Follow-Up:  Yes       Discharge recommendations:  nursing facility, skilled   Barriers to Discharge:  Level of Skilled Assistance Needed      Time Tracking:     SLP Treatment Date:   06/29/23  Speech Start Time:  0950  Speech Stop Time:  1020     Speech Total Time (min):  30 min    Billable Minutes: Speech Therapy Individual 10, Treatment Swallowing Dysfunction 10, and Self Care/Home Management Training 10    06/29/2023

## 2023-06-29 NOTE — PLAN OF CARE
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, Laennec's  No date: Depression  No date: Encounter for blood transfusion  6/15/2015: Epistaxis  No date: Fatigue  No date: Glaucoma  No date: Hematuria  6/15/2015: Hepatic encephalopathy  No date: Hepatic enlargement  No date: History of psychiatric care  No date: History of psychiatric hospitalization  6/15/2015: History of seizure      Comment:  1  6/15/2015: hx of intentional Tylenol overdose      Comment:  2005- situational and hx of bipolar  No date: Hx of psychiatric care  9/18/2015: Macrocytic anemia      Comment:  6 units PRBC since June 2015  No date: Jeana  No date: Osteoarthritis  6/15/2015: Other ascites  No date: Psychiatric exam requested by authority  No date: Psychiatric problem  9/26/2019: Psychosis  No date: Renal disorder  No date: Seizures  No date: Self-harming behavior  No date: Suicide attempt  No date: Therapy  6/15/2015: Thrombocytopenia    Past Surgical History:  No date: COSMETIC SURGERY  No date: ESOPHAGOGASTRODUODENOSCOPY    Individualization:   1. Patient likes to listen to music.    Restraints:   1. Bilateral soft wrist restraints initiated 6/6/23 1930 for pulling of lines. [facility] for DC criteria.         Outcome: Ongoing, Progressing  Goal: Absence of Hospital-Acquired Illness or Injury  Outcome: Ongoing, Progressing  Goal: Optimal Comfort and Wellbeing  Outcome: Ongoing, Progressing  Goal: Readiness for Transition of Care  Outcome: Ongoing, Progressing     Problem: Fluid and  Electrolyte Imbalance (Acute Kidney Injury/Impairment)  Goal: Fluid and Electrolyte Balance  Outcome: Ongoing, Progressing     Problem: Oral Intake Inadequate (Acute Kidney Injury/Impairment)  Goal: Optimal Nutrition Intake  Outcome: Ongoing, Progressing     Problem: Renal Function Impairment (Acute Kidney Injury/Impairment)  Goal: Effective Renal Function  Outcome: Ongoing, Progressing   Aaox4. Safety precautions maintained. Call bell within reach. Poc reviewed. Questions and concerns answered. Pt in no apparent distress. Care ongoing.

## 2023-06-29 NOTE — PLAN OF CARE
Recommendations     1. Continue Dysphagia soft diet, texture per SLP.   2. Add Boost Plus ONS & encourage PO intake as tolerated.   3. RD to monitor & follow-up.     Goals: Meet % EEN by RD f/u.  Nutrition Goal Status: progressing towards goal  Communication of RD Recs: other (comment) (POC)

## 2023-06-30 ENCOUNTER — HOSPITAL ENCOUNTER (INPATIENT)
Facility: HOSPITAL | Age: 58
LOS: 5 days | Discharge: ANOTHER HOSPITAL | DRG: 100 | End: 2023-07-05
Attending: HOSPITALIST | Admitting: HOSPITALIST
Payer: MEDICARE

## 2023-06-30 VITALS
RESPIRATION RATE: 16 BRPM | HEIGHT: 62 IN | BODY MASS INDEX: 24.3 KG/M2 | SYSTOLIC BLOOD PRESSURE: 135 MMHG | TEMPERATURE: 99 F | HEART RATE: 113 BPM | OXYGEN SATURATION: 90 % | WEIGHT: 132.06 LBS | DIASTOLIC BLOOD PRESSURE: 62 MMHG

## 2023-06-30 DIAGNOSIS — G93.40 ACUTE ENCEPHALOPATHY: ICD-10-CM

## 2023-06-30 DIAGNOSIS — G40.901 NON-CONVULSIVE STATUS EPILEPTICUS: ICD-10-CM

## 2023-06-30 PROBLEM — D62 ACUTE BLOOD LOSS ANEMIA: Status: ACTIVE | Noted: 2023-06-30

## 2023-06-30 PROBLEM — E86.1 HYPOTENSION DUE TO HYPOVOLEMIA: Status: ACTIVE | Noted: 2023-06-04

## 2023-06-30 PROBLEM — G93.41 ACUTE METABOLIC ENCEPHALOPATHY: Status: ACTIVE | Noted: 2023-05-19

## 2023-06-30 PROBLEM — I95.9 HYPOTENSION: Status: ACTIVE | Noted: 2023-06-30

## 2023-06-30 PROBLEM — B96.4 URINARY TRACT INFECTION DUE TO PROTEUS: Status: ACTIVE | Noted: 2021-11-03

## 2023-06-30 PROBLEM — I95.89 HYPOTENSION DUE TO HYPOVOLEMIA: Status: ACTIVE | Noted: 2023-06-04

## 2023-06-30 PROBLEM — K72.01 ACUTE LIVER FAILURE WITH HEPATIC COMA: Status: ACTIVE | Noted: 2023-06-03

## 2023-06-30 LAB
ABO + RH BLD: NORMAL
ALBUMIN SERPL BCP-MCNC: 2.7 G/DL (ref 3.5–5.2)
ALP SERPL-CCNC: 141 U/L (ref 55–135)
ALT SERPL W/O P-5'-P-CCNC: 21 U/L (ref 10–44)
ANION GAP SERPL CALC-SCNC: 9 MMOL/L (ref 8–16)
AST SERPL-CCNC: 39 U/L (ref 10–40)
BILIRUB SERPL-MCNC: 6.6 MG/DL (ref 0.1–1)
BLD GP AB SCN CELLS X3 SERPL QL: NORMAL
BUN SERPL-MCNC: 8 MG/DL (ref 6–20)
CALCIUM SERPL-MCNC: 9 MG/DL (ref 8.7–10.5)
CHLORIDE SERPL-SCNC: 107 MMOL/L (ref 95–110)
CO2 SERPL-SCNC: 21 MMOL/L (ref 23–29)
CREAT SERPL-MCNC: 0.4 MG/DL (ref 0.5–1.4)
EST. GFR  (NO RACE VARIABLE): >60 ML/MIN/1.73 M^2
GLUCOSE SERPL-MCNC: 137 MG/DL (ref 70–110)
INR PPP: 1.9 (ref 0.8–1.2)
MAGNESIUM SERPL-MCNC: 1.5 MG/DL (ref 1.6–2.6)
PHOSPHATE SERPL-MCNC: 2.8 MG/DL (ref 2.7–4.5)
POTASSIUM SERPL-SCNC: 3.6 MMOL/L (ref 3.5–5.1)
PROT SERPL-MCNC: 5.7 G/DL (ref 6–8.4)
PROTHROMBIN TIME: 19.8 SEC (ref 9–12.5)
SARS-COV-2 RNA RESP QL NAA+PROBE: NOT DETECTED
SODIUM SERPL-SCNC: 137 MMOL/L (ref 136–145)
SPECIMEN OUTDATE: NORMAL

## 2023-06-30 PROCEDURE — 97110 THERAPEUTIC EXERCISES: CPT | Mod: CQ

## 2023-06-30 PROCEDURE — 99239 PR HOSPITAL DISCHARGE DAY,>30 MIN: ICD-10-PCS | Mod: ,,, | Performed by: INTERNAL MEDICINE

## 2023-06-30 PROCEDURE — 99239 HOSP IP/OBS DSCHRG MGMT >30: CPT | Mod: ,,, | Performed by: INTERNAL MEDICINE

## 2023-06-30 PROCEDURE — 99232 PR SUBSEQUENT HOSPITAL CARE,LEVL II: ICD-10-PCS | Mod: ,,, | Performed by: PSYCHIATRY & NEUROLOGY

## 2023-06-30 PROCEDURE — 25000003 PHARM REV CODE 250: Performed by: INTERNAL MEDICINE

## 2023-06-30 PROCEDURE — 25000003 PHARM REV CODE 250: Performed by: NURSE PRACTITIONER

## 2023-06-30 PROCEDURE — 97530 THERAPEUTIC ACTIVITIES: CPT

## 2023-06-30 PROCEDURE — 86900 BLOOD TYPING SEROLOGIC ABO: CPT | Performed by: INTERNAL MEDICINE

## 2023-06-30 PROCEDURE — 25000003 PHARM REV CODE 250: Performed by: PSYCHIATRY & NEUROLOGY

## 2023-06-30 PROCEDURE — 97535 SELF CARE MNGMENT TRAINING: CPT

## 2023-06-30 PROCEDURE — 25000003 PHARM REV CODE 250: Performed by: HOSPITALIST

## 2023-06-30 PROCEDURE — 25000003 PHARM REV CODE 250: Performed by: PHYSICIAN ASSISTANT

## 2023-06-30 PROCEDURE — 94761 N-INVAS EAR/PLS OXIMETRY MLT: CPT

## 2023-06-30 PROCEDURE — 84100 ASSAY OF PHOSPHORUS: CPT

## 2023-06-30 PROCEDURE — 99900035 HC TECH TIME PER 15 MIN (STAT)

## 2023-06-30 PROCEDURE — 87635 SARS-COV-2 COVID-19 AMP PRB: CPT | Performed by: INTERNAL MEDICINE

## 2023-06-30 PROCEDURE — 99232 SBSQ HOSP IP/OBS MODERATE 35: CPT | Mod: ,,, | Performed by: PSYCHIATRY & NEUROLOGY

## 2023-06-30 PROCEDURE — 1111F PR DISCHARGE MEDS RECONCILED W/ CURRENT OUTPATIENT MED LIST: ICD-10-PCS | Mod: CPTII,,, | Performed by: INTERNAL MEDICINE

## 2023-06-30 PROCEDURE — 83735 ASSAY OF MAGNESIUM: CPT

## 2023-06-30 PROCEDURE — 92526 ORAL FUNCTION THERAPY: CPT

## 2023-06-30 PROCEDURE — 92507 TX SP LANG VOICE COMM INDIV: CPT

## 2023-06-30 PROCEDURE — 80053 COMPREHEN METABOLIC PANEL: CPT

## 2023-06-30 PROCEDURE — 94668 MNPJ CHEST WALL SBSQ: CPT

## 2023-06-30 PROCEDURE — 11000004 HC SNF PRIVATE

## 2023-06-30 PROCEDURE — 85610 PROTHROMBIN TIME: CPT | Performed by: NURSE PRACTITIONER

## 2023-06-30 PROCEDURE — 1111F DSCHRG MED/CURRENT MED MERGE: CPT | Mod: CPTII,,, | Performed by: INTERNAL MEDICINE

## 2023-06-30 RX ORDER — OLANZAPINE 15 MG/1
15 TABLET ORAL NIGHTLY
Qty: 30 TABLET | Refills: 11 | Status: SHIPPED | OUTPATIENT
Start: 2023-06-30 | End: 2023-06-30 | Stop reason: SDUPTHER

## 2023-06-30 RX ORDER — CALCIUM CARBONATE 200(500)MG
500 TABLET,CHEWABLE ORAL 2 TIMES DAILY PRN
Status: DISCONTINUED | OUTPATIENT
Start: 2023-06-30 | End: 2023-07-06 | Stop reason: HOSPADM

## 2023-06-30 RX ORDER — OLANZAPINE 2.5 MG/1
10 TABLET ORAL NIGHTLY
Status: DISCONTINUED | OUTPATIENT
Start: 2023-06-30 | End: 2023-07-03

## 2023-06-30 RX ORDER — LEVETIRACETAM 500 MG/1
1000 TABLET ORAL 2 TIMES DAILY
Status: DISCONTINUED | OUTPATIENT
Start: 2023-06-30 | End: 2023-07-06 | Stop reason: HOSPADM

## 2023-06-30 RX ORDER — OLANZAPINE 5 MG/1
15 TABLET ORAL NIGHTLY
Status: DISCONTINUED | OUTPATIENT
Start: 2023-06-30 | End: 2023-06-30 | Stop reason: HOSPADM

## 2023-06-30 RX ORDER — HYDROXYZINE HYDROCHLORIDE 50 MG/1
50 TABLET, FILM COATED ORAL NIGHTLY PRN
Status: ON HOLD
Start: 2023-06-30 | End: 2023-07-06

## 2023-06-30 RX ORDER — ACETAMINOPHEN 325 MG/1
650 TABLET ORAL EVERY 6 HOURS PRN
Status: DISCONTINUED | OUTPATIENT
Start: 2023-06-30 | End: 2023-07-06 | Stop reason: HOSPADM

## 2023-06-30 RX ORDER — FUROSEMIDE 40 MG/1
40 TABLET ORAL 2 TIMES DAILY
Status: DISCONTINUED | OUTPATIENT
Start: 2023-07-01 | End: 2023-07-06 | Stop reason: HOSPADM

## 2023-06-30 RX ORDER — HYDROXYZINE HYDROCHLORIDE 25 MG/1
50 TABLET, FILM COATED ORAL NIGHTLY PRN
Status: DISCONTINUED | OUTPATIENT
Start: 2023-06-30 | End: 2023-07-06 | Stop reason: HOSPADM

## 2023-06-30 RX ORDER — AMOXICILLIN 250 MG
1 CAPSULE ORAL 2 TIMES DAILY
Status: DISCONTINUED | OUTPATIENT
Start: 2023-06-30 | End: 2023-07-06 | Stop reason: HOSPADM

## 2023-06-30 RX ORDER — SPIRONOLACTONE 25 MG/1
50 TABLET ORAL DAILY
Status: DISCONTINUED | OUTPATIENT
Start: 2023-07-01 | End: 2023-07-06 | Stop reason: HOSPADM

## 2023-06-30 RX ORDER — FOLIC ACID 1 MG/1
1 TABLET ORAL DAILY
Status: DISCONTINUED | OUTPATIENT
Start: 2023-07-01 | End: 2023-07-06 | Stop reason: HOSPADM

## 2023-06-30 RX ORDER — FUROSEMIDE 40 MG/1
40 TABLET ORAL 2 TIMES DAILY
Qty: 180 TABLET | Refills: 3 | Status: ON HOLD | OUTPATIENT
Start: 2023-06-30 | End: 2023-07-06

## 2023-06-30 RX ORDER — LITHIUM CARBONATE 300 MG/1
300 TABLET, FILM COATED, EXTENDED RELEASE ORAL DAILY
Status: DISCONTINUED | OUTPATIENT
Start: 2023-06-30 | End: 2023-06-30

## 2023-06-30 RX ORDER — LACTULOSE 10 G/15ML
20 SOLUTION ORAL 3 TIMES DAILY
Status: ON HOLD
Start: 2023-06-30 | End: 2023-07-06

## 2023-06-30 RX ORDER — OLANZAPINE 10 MG/1
10 TABLET ORAL NIGHTLY
Qty: 30 TABLET | Refills: 11 | Status: ON HOLD | OUTPATIENT
Start: 2023-06-30 | End: 2023-08-01 | Stop reason: HOSPADM

## 2023-06-30 RX ORDER — LITHIUM CARBONATE 150 MG/1
150 CAPSULE ORAL NIGHTLY
Qty: 30 CAPSULE | Refills: 11 | Status: ON HOLD | OUTPATIENT
Start: 2023-06-30 | End: 2023-08-01 | Stop reason: HOSPADM

## 2023-06-30 RX ORDER — ZONISAMIDE 100 MG/1
100 CAPSULE ORAL DAILY
Status: DISCONTINUED | OUTPATIENT
Start: 2023-06-30 | End: 2023-06-30

## 2023-06-30 RX ORDER — TRAZODONE HYDROCHLORIDE 50 MG/1
50 TABLET ORAL NIGHTLY
Qty: 90 TABLET | Refills: 3 | Status: SHIPPED | OUTPATIENT
Start: 2023-06-30 | End: 2023-06-30 | Stop reason: HOSPADM

## 2023-06-30 RX ORDER — BACITRACIN ZINC 500 [USP'U]/G
OINTMENT TOPICAL 3 TIMES DAILY
Status: DISCONTINUED | OUTPATIENT
Start: 2023-06-30 | End: 2023-07-01

## 2023-06-30 RX ORDER — PROPRANOLOL HYDROCHLORIDE 10 MG/1
20 TABLET ORAL DAILY
Status: DISCONTINUED | OUTPATIENT
Start: 2023-07-01 | End: 2023-07-06 | Stop reason: HOSPADM

## 2023-06-30 RX ORDER — LACTULOSE 10 G/15ML
20 SOLUTION ORAL 3 TIMES DAILY
Status: DISCONTINUED | OUTPATIENT
Start: 2023-06-30 | End: 2023-07-06 | Stop reason: HOSPADM

## 2023-06-30 RX ORDER — LITHIUM CARBONATE 150 MG/1
150 CAPSULE ORAL NIGHTLY
Status: DISCONTINUED | OUTPATIENT
Start: 2023-06-30 | End: 2023-07-06 | Stop reason: HOSPADM

## 2023-06-30 RX ORDER — QUETIAPINE FUMARATE 100 MG/1
100 TABLET, FILM COATED ORAL NIGHTLY
Status: DISCONTINUED | OUTPATIENT
Start: 2023-06-30 | End: 2023-06-30

## 2023-06-30 RX ORDER — SPIRONOLACTONE 50 MG/1
50 TABLET, FILM COATED ORAL DAILY
Qty: 90 TABLET | Refills: 3 | Status: ON HOLD | OUTPATIENT
Start: 2023-07-01 | End: 2023-08-01

## 2023-06-30 RX ORDER — OLANZAPINE 5 MG/1
5 TABLET ORAL NIGHTLY
Status: DISCONTINUED | OUTPATIENT
Start: 2023-06-30 | End: 2023-06-30

## 2023-06-30 RX ORDER — TALC
6 POWDER (GRAM) TOPICAL NIGHTLY PRN
Status: DISCONTINUED | OUTPATIENT
Start: 2023-06-30 | End: 2023-07-06 | Stop reason: HOSPADM

## 2023-06-30 RX ORDER — BACITRACIN ZINC 500 [USP'U]/G
OINTMENT TOPICAL 3 TIMES DAILY
Status: ON HOLD
Start: 2023-06-30 | End: 2023-07-06

## 2023-06-30 RX ADMIN — SILODOSIN 4 MG: 4 CAPSULE ORAL at 08:06

## 2023-06-30 RX ADMIN — SENNOSIDES AND DOCUSATE SODIUM 1 TABLET: 50; 8.6 TABLET ORAL at 10:06

## 2023-06-30 RX ADMIN — BACITRACIN 1 EACH: 500 OINTMENT TOPICAL at 03:06

## 2023-06-30 RX ADMIN — FUROSEMIDE 40 MG: 40 TABLET ORAL at 06:06

## 2023-06-30 RX ADMIN — OLANZAPINE 15 MG: 5 TABLET, FILM COATED ORAL at 08:06

## 2023-06-30 RX ADMIN — Medication 6 MG: at 10:06

## 2023-06-30 RX ADMIN — BACITRACIN 1 EACH: 500 OINTMENT TOPICAL at 10:06

## 2023-06-30 RX ADMIN — RIFAXIMIN 550 MG: 550 TABLET ORAL at 08:06

## 2023-06-30 RX ADMIN — LEVETIRACETAM 1000 MG: 500 SOLUTION ORAL at 08:06

## 2023-06-30 RX ADMIN — LACTULOSE 15 G: 20 SOLUTION ORAL at 08:06

## 2023-06-30 RX ADMIN — LACTULOSE 20 G: 20 SOLUTION ORAL at 10:06

## 2023-06-30 RX ADMIN — BACITRACIN 1 EACH: 500 OINTMENT TOPICAL at 08:06

## 2023-06-30 RX ADMIN — MUPIROCIN: 20 OINTMENT TOPICAL at 08:06

## 2023-06-30 RX ADMIN — LEVETIRACETAM 1000 MG: 500 TABLET, FILM COATED ORAL at 10:06

## 2023-06-30 RX ADMIN — LACTULOSE 15 G: 20 SOLUTION ORAL at 03:06

## 2023-06-30 RX ADMIN — SPIRONOLACTONE 50 MG: 25 TABLET, FILM COATED ORAL at 08:06

## 2023-06-30 RX ADMIN — TRAZODONE HYDROCHLORIDE 50 MG: 50 TABLET ORAL at 08:06

## 2023-06-30 RX ADMIN — RIFAXIMIN 550 MG: 550 TABLET ORAL at 10:06

## 2023-06-30 RX ADMIN — HYDROXYZINE HYDROCHLORIDE 50 MG: 25 TABLET, FILM COATED ORAL at 10:06

## 2023-06-30 RX ADMIN — FUROSEMIDE 40 MG: 40 TABLET ORAL at 08:06

## 2023-06-30 RX ADMIN — OLANZAPINE 10 MG: 2.5 TABLET, FILM COATED ORAL at 10:06

## 2023-06-30 NOTE — PLAN OF CARE
[facility]     Department of Hospital Medicine     [address] [place] [address]     [phone] after hours  [phone] fax       NURSING HOME ORDERS                                     06/30/2023    Admit to Nursing Home:   Skilled Bed      Diagnoses:  Active Hospital Problems    Diagnosis  POA    *Non-convulsive status epilepticus [G40.901]  No    Acute respiratory insufficiency [R06.89]  Yes    Abrasion of nose [S00.31XA]  No    Atelectasis [J98.11]  Yes    Internal jugular (IJ) vein thromboembolism, acute, right [I82.C11]  No    Retroperitoneal bleed [R58]  Yes    Coagulopathy [D68.9]  Yes    Deep vein thrombosis (DVT) of femoral vein of right lower extremity [I82.411]  Yes    Acute deep vein thrombosis (DVT) of brachial vein of right upper extremity [I82.621]  Yes    Acute liver failure without hepatic coma [K72.00]  Yes    Anemia [D64.9]  Yes    Acute encephalopathy [G93.40]  Yes    Hyperammonemia [E72.20]  Yes    Hepatic cirrhosis [K74.60]  Yes    Bipolar 1 disorder [F31.9]  Yes    Severe protein-calorie malnutrition [E43]  Yes    Macrocytic anemia [D53.9]  Yes     6 units PRBC since June 2015      Thrombocytopenia [D69.6]  Yes    Alcohol abuse, in remission [F10.11]  Yes     Has addiction psych clearance from Dr Crain for liver tx        Resolved Hospital Problems    Diagnosis Date Resolved POA    Endotracheally intubated [Z97.8] 06/23/2023 No    Hypovolemic shock [R57.1] 06/20/2023 Yes    Hemorrhagic shock [R57.8] 06/20/2023 No    Hypotension (arterial) [I95.9] 06/20/2023 No    Acute respiratory failure with hypoxia [J96.01] 06/25/2023 Yes    Hypernatremia [E87.0] 06/15/2023 Yes    Breakthrough seizure [G40.919] 06/28/2023 Yes     In responsive to benzo withdrawl      Hepatic encephalopathy [K76.82] 06/28/2023 Yes       Allergies:  Review of patient's allergies indicates:   Allergen Reactions    Sulfa (sulfonamide antibiotics) Rash    Codeine  Nausea And Vomiting       Full code    Vitals:    Every shift (Skilled Nursing patients)    Diet:low sodium 1500 mL fluid restriction  Boost plus with meals TID    Acitivities:      - Up in a chair each morning as tolerated   - May use walker, cane, or self-propelled wheelchair    Nursing Precautions:    - Aspiration precautions:             -  Upright 90 degrees before during and after meals    - Decubitus precautions:        -  for positioning   - Pressure reducing foam mattress   - Turn patient every two hours. Use wedge pillows to anchor patient   - Fall precautions   - Seizure precaution    CONSULTS:      PT to evaluate and treat     OT to evaluate and treat     ST to evaluate and treat     Nutrition to evaluate and recommend diet      LABS:  renal function panel and lithium level in one week, otherwise as per unit routine    WOUND CARE:  Wound spray or saline for wound cleaning with all dressing changes.    All wounds to be measured with first dressing changes and every week.    Nasal bridge - Q5 days/PRN - change out foam dressing to bridge of pts nose.    groin, sacrum and posterior thighs - BID/PRN - Apply Triad to BL groin, sacrum and posterior thighs. Keep pt clean and dry, no diapers.    Bilateral arms and sternum - Q3 days/PRN for saturation - skin tears  - cleanse w/ NS, pat dry. Apply piece of Xeroform or Petroleum gauze as a non-adherent layer and secure w/ foam dressing or island boarder. Keep arms elevated.    Medications: Discontinue all previous medication orders, if any. See new list below.  Current Discharge Medication List        START taking these medications    Details   bacitracin zinc 500 unit/gram OiPk Apply topically 3 (three) times daily. for 14 days      furosemide (LASIX) 40 MG tablet Take 1 tablet (40 mg total) by mouth 2 (two) times daily.  Qty: 180 tablet, Refills: 3      lithium carbonate 150 MG capsule Take 1 capsule (150 mg total) by mouth every evening.  Qty: 30 capsule,  Refills: 11      OLANZapine (ZYPREXA) 10 MG tablet Take 1 tablet (10 mg total) by mouth every evening.  Qty: 30 tablet, Refills: 11      spironolactone (ALDACTONE) 50 MG tablet Take 1 tablet (50 mg total) by mouth once daily.  Qty: 90 tablet, Refills: 3           CONTINUE these medications which have CHANGED    Details   hydrOXYzine (ATARAX) 50 MG tablet Take 1 tablet (50 mg total) by mouth nightly as needed for Itching or Anxiety (or insomnia).      lactulose (CHRONULAC) 20 gram/30 mL Soln 30 mLs (20 g total) by mouth route 3 (three) times daily. Always give first dose in AM. Hold PM dose(s) if has had 3 BMs by them      rifAXIMin (XIFAXAN) 550 mg Tab Take 1 tablet (550 mg total) by mouth 2 (two) times daily.  Qty: 180 tablet, Refills: 3           CONTINUE these medications which have NOT CHANGED    Details   folic acid (FOLVITE) 1 MG tablet Take 1 tablet (1 mg total) by mouth once daily.  Qty: 30 tablet, Refills: 2      levETIRAcetam (KEPPRA) 1000 MG tablet Take 1,000 mg by mouth 2 (two) times daily.      propranoloL (INDERAL) 20 MG tablet Take 20 mg by mouth once daily.           STOP taking these medications       zonisamide (ZONEGRAN) 100 MG Cap Comments:   Reason for Stopping:               _________________________________  Renee Arciniega MD  06/30/2023

## 2023-06-30 NOTE — PLAN OF CARE
[facility] - Telemetry Stepdown  Discharge Final Note    Primary Care Provider: Viktor Ross MD    Expected Discharge Date: 6/30/2023    Final Discharge Note (most recent)       Final Note - 06/30/23 1633          Final Note    Assessment Type Final Discharge Note     Anticipated Discharge Disposition Skilled Nursing Facility   [facility]    Hospital Resources/Appts/Education Provided Appointments scheduled and added to AVS   per SNF       Post-Acute Status    Post-Acute Authorization Placement     Post-Acute Placement Status Set-up Complete/Auth obtained   [facility]    Discharge Delays None known at this time                   Contact Info       Dewey Coates MD   Specialty: Psychiatry    [address] [place] [id number]   Phone: [phone]       Next Steps: Follow up on 7/17/2023    Instructions: Hospital Follow-up phone call/virtual appointment on 7/17/23 at 2:40 PM        Mirela Cintron LCSW  [facility]  Ext. [phone]

## 2023-06-30 NOTE — CONSULTS
[facility]  Procedure Note    [facility] Patient Status:  Outpatient    1970 MRN [id number]   Location [address] [facility] Attending Lian Fraser MD   Hosp Day # 0 PCP Carolyn Aimn MD     Procedure: Right jad Please see Progress Note from 6/30/2023 written by Priya Sheffield MD regarding re-consult.     RAFA Sheffield MD

## 2023-06-30 NOTE — PT/OT/SLP PROGRESS
Physical Therapy Co Treatment  Co-treatment performed due to requiring two skilled therapists to appropriately and safely assess patient's strength and endurance while facilitating functional tasks in addition to accommodating for patient's activity tolerance.      Patient Name:  Marely Hamilton   MRN:  [id number]    Recommendations:     Discharge Recommendations: nursing facility, skilled  Discharge Equipment Recommendations: to be determined by next level of care  Barriers to discharge:  increased level of assistance needed    Assessment:     Marely Hamilton is a 58 y.o. female admitted with a medical diagnosis of Non-convulsive status epilepticus.  She presents with the following impairments/functional limitations: weakness, impaired endurance, impaired functional mobility, gait instability, impaired cognition, decreased safety awareness, impaired balance, decreased lower extremity function, decreased upper extremity function, impaired coordination, decreased ROM, impaired cardiopulmonary response to activity.    Rehab Prognosis: Good; patient would benefit from acute skilled PT services to address these deficits and reach maximum level of function.    Recent Surgery: Procedure(s) (LRB):  EMBOLIZATION, BLOOD VESSEL (N/A) 19 Days Post-Op    Plan:     During this hospitalization, patient to be seen 3 x/week to address the identified rehab impairments via gait training, therapeutic activities, therapeutic exercises, neuromuscular re-education and progress toward the following goals:    Plan of Care Expires:  07/25/23    Subjective     Chief Complaint: only hear from one therapist at a time 2/2 being overwhelming  Patient/Family Comments/goals: get better  Pain/Comfort:  Pain Rating 1: 0/10      Objective:     Communicated with RN prior to session.  Patient found HOB elevated with peripheral IV, telemetry, pulse ox (continuous), PureWick upon PT entry to room.     General Precautions: Standard, aspiration  Orthopedic  Precautions: N/A  Braces: N/A  Respiratory Status: Room air     Functional Mobility:  Bed Mobility:     Rolling Left:  maximal assistance  Rolling Right: maximal assistance  Scooting: total assistance and of 2 persons  Supine to Sit: total assistance and of 2 persons  Sit to Supine: maximal assistance and of 2 persons  Transfers:     Sit to Stand:  total assistance and of 2 persons with hand-held assist  Pt unable to clear EOB 2/2 resisting and refused any more attempts.      AM-PAC 6 CLICK MOBILITY  Turning over in bed (including adjusting bedclothes, sheets and blankets)?: 2  Sitting down on and standing up from a chair with arms (e.g., wheelchair, bedside commode, etc.): 1  Moving from lying on back to sitting on the side of the bed?: 2  Moving to and from a bed to a chair (including a wheelchair)?: 1  Need to walk in hospital room?: 1  Climbing 3-5 steps with a railing?: 1  Basic Mobility Total Score: 8       Treatment & Education:  Pt peformed LAQs sitting EOB x 10 reps each leg. Pt sat EOB ~15' with SBA/CGA.  Pt educated on:  - Role of PT and POC/goals for therapy   - Safety with mobility and fall risk   - Instructed to call nursing staff for assistance with mobility as needed   -importance of participating with therapy, mobilization, getting out of bed.      Patient left HOB elevated with all lines intact, call button in reach, bed alarm on, and RN notified..    GOALS:   Multidisciplinary Problems       Physical Therapy Goals          Problem: Physical Therapy    Goal Priority Disciplines Outcome Goal Variances Interventions   Physical Therapy Goal     PT, PT/OT Ongoing, Progressing     Description: Goals to be met by: 2023     Patient will increase functional independence with mobility by performin. Supine to sit with moderate assistance  2. Sit to supine with moderate assistance  3. Sit to stand transfer with moderate assistance  4. Bed to chair transfer with maximal assistance using LRAD as  needed  5. Gait  x 5 feet with maximal assistance using LRAD as needed  6. Sitting at edge of bed x8 minutes with Stand-by Assistance  7. Lower extremity exercise program x10 reps per handout, with independence                        Time Tracking:     PT Received On: 06/30/23  PT Start Time: 0926     PT Stop Time: 0949  PT Total Time (min): 23 min     Billable Minutes: Therapeutic Exercise 23 mins    Treatment Type: Treatment  PT/PTA: PTA     Number of PTA visits since last PT visit: 1 06/30/2023

## 2023-06-30 NOTE — PLAN OF CARE
Spoke with Yess[facility] at [facility] who reports they have insurance auth and can admit patient today pending covid test result and SNF orders. SNF orders have been entered but may be subject to change as psychiatry recommendations are pending for today. Covid test is pending result.    Met with patient at bedside and discussed [facility] and current status. Patient is agreeable to [facility] today. Attempted to contact patient's sister, Zaria, and left voicemail requesting return call.    12:01 PM  Received return call from Zaria and updated on above. Per Zaria, she just finished speaking with psychiatry MD regarding recommendations/updates.    12:45 PM  Covid test has resulted negative and MD updated SNF orders with psychiatry recs. Per Yessenia at [facility], admit is pending the room being ready.    1:45 PM  Scheduled earliest possible appt for patient for psychiatry f/u with Dr. Coates. Appt added to AVS and updated patient's sister.    4:31 PM   06/30/23 1631   Post-Acute Status   Post-Acute Authorization Placement   Post-Acute Placement Status Set-up Complete/Auth obtained   Per Yessenia at [facility], nurse can call report to [phone] and SW can request transportation for 5:30 PM. RN and MD updated.    SW arranged stretcher transport via [facility]. Requested  time is 5:30 PM. Requested  time does not guarantee arrival time.    Updated patient's sister on above.    Mirela Cintron LCSW  [facility]  Ext. [phone]

## 2023-06-30 NOTE — PLAN OF CARE
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, Laennec's  No date: Depression  No date: Encounter for blood transfusion  6/15/2015: Epistaxis  No date: Fatigue  No date: Glaucoma  No date: Hematuria  6/15/2015: Hepatic encephalopathy  No date: Hepatic enlargement  No date: History of psychiatric care  No date: History of psychiatric hospitalization  6/15/2015: History of seizure      Comment:  1  6/15/2015: hx of intentional Tylenol overdose      Comment:  2005- situational and hx of bipolar  No date: Hx of psychiatric care  9/18/2015: Macrocytic anemia      Comment:  6 units PRBC since June 2015  No date: Jeana  No date: Osteoarthritis  6/15/2015: Other ascites  No date: Psychiatric exam requested by authority  No date: Psychiatric problem  9/26/2019: Psychosis  No date: Renal disorder  No date: Seizures  No date: Self-harming behavior  No date: Suicide attempt  No date: Therapy  6/15/2015: Thrombocytopenia    Past Surgical History:  No date: COSMETIC SURGERY  No date: ESOPHAGOGASTRODUODENOSCOPY    Individualization:   1. Patient likes to listen to music.    Restraints:   1. Bilateral soft wrist restraints initiated 6/6/23 1930 for pulling of lines. [facility] for DC criteria.         Outcome: Ongoing, Progressing  Goal: Absence of Hospital-Acquired Illness or Injury  Outcome: Ongoing, Progressing  Goal: Optimal Comfort and Wellbeing  Outcome: Ongoing, Progressing  Goal: Readiness for Transition of Care  Outcome: Ongoing, Progressing     Problem: Fluid and  Electrolyte Imbalance (Acute Kidney Injury/Impairment)  Goal: Fluid and Electrolyte Balance  Outcome: Ongoing, Progressing     Problem: Oral Intake Inadequate (Acute Kidney Injury/Impairment)  Goal: Optimal Nutrition Intake  Outcome: Ongoing, Progressing     Problem: Renal Function Impairment (Acute Kidney Injury/Impairment)  Goal: Effective Renal Function  Outcome: Ongoing, Progressing     Problem: Infection  Goal: Absence of Infection Signs and Symptoms  Outcome: Ongoing, Progressing     Problem: Skin Injury Risk Increased  Goal: Skin Health and Integrity  Outcome: Ongoing, Progressing     Problem: Fall Injury Risk  Goal: Absence of Fall and Fall-Related Injury  Outcome: Ongoing, Progressing     Problem: Impaired Wound Healing  Goal: Optimal Wound Healing  Outcome: Ongoing, Progressing     Problem: Adjustment to Illness (Delirium)  Goal: Optimal Coping  Outcome: Ongoing, Progressing     Problem: Altered Behavior (Delirium)  Goal: Improved Behavioral Control  Outcome: Ongoing, Progressing     Problem: Attention and Thought Clarity Impairment (Delirium)  Goal: Improved Attention and Thought Clarity  Outcome: Ongoing, Progressing     Problem: Sleep Disturbance (Delirium)  Goal: Improved Sleep  Outcome: Ongoing, Progressing

## 2023-06-30 NOTE — ASSESSMENT & PLAN NOTE
Multifactorial  Hepatic encephalopathy  Proteus mirabilis UTI  Non-convulsive status epilepticus  Improving  Delirium precautions

## 2023-06-30 NOTE — PROGRESS NOTES
"CONSULTATION LIAISON PSYCHIATRY PROGRESS NOTE    Patient Name: Marely Hamilton  MRN: [id number]  Patient Class: IP- Inpatient  Admission Date: 5/28/2023  Attending Physician: Renee Arciniega MD      SUBJECTIVE:   Marely Hamilton is a 58 y.o. female with past psychiatric history of bipolar disorder, alcohol use disorder & past pertinent medical history of seizure disorder (on AEDs), ETOH cirrhosis presents to the ED/admitted to the hospital for Acute encephalopathy     Psychiatry consulted for hx of bipolar on lithium complicated by hypercalcemia at OSH, switched to abilify. Concern for persistent encephalopathy, unclear if psych in origin vs underlying HE exacerbating it"    Re-consulted today for assistance with medication management and outpatient mental health follow-up.      Today, patient sitting up in bed. States "you think I look better?" Oriented to person, place. Somewhat oriented to situation. States she is feeling better. Some difficulty sleeping at night. Notes taking naps during the day. Has been eating a little more recently, enjoys chocolate boost. Denies problems with depression. States her sister has been helpful. Acknowledges she has some problems with her memory and needs redirection from sister. Is agreeable to discharge to SNF and continue to build strength. Denies SI/HI/AVH. No current symptoms for psychosis or crystal. Very distractible, requiring redirection.     Spoke with patient's sister Zaria regarding disposition and medication changes. She worries patient is medically improved but may have exacerbation of psychiatric symptoms. Has been very stable outpatient on [place] in past. States last night patient called sister with concerns that she had brain surgery. Sister note she is more confused and has not returned to baseline mentation. Discussed delirium especially in setting of prolonged hospitalization. Discuss patient going to SNF and following up with [facility] psych or  via virtual " "visit. Sister plans to come to [place] to assist with transition from SNF to home with sitter with long-term goal of moving patient to [facility] closer to sister.        OBJECTIVE:    Mental Status Exam:  General Appearance:  Fair hygiene, disheveled, dressed in hospital gown; bruises notes to arm, chest  Behavior:  calm, cooperative, pleasant; appropriate eye contact  Involuntary Movements and Motor Activity:  tremor noted to right upper extremity  Gait and Station: unable to assess - patient lying down or seated  Speech and Language:  spontaneous, normal rate, volume, tone  Mood: "it's good" denies depression  Affect: blunted but euthymic at times  Thought Process and Associations:  goal-direct for most party, poverty of thought at time  Thought Content and Perceptions:: no suicidal or homicidal ideation, no auditory or visual hallucinations, no paranoid ideation, no ideas of reference, no evidence of delusions or psychosis  Sensorium and Orientation:  oriented to self, fluctuating, oriented to place, time; disoriented to month/year  Recent and Remote Memory:  impaired  Attention and Concentration:  impaired, unable to spell WORLD forwards and backwards, unable to recall months of the year; >2 errors on [facility] of Knowledge:  able to recall current and past 2 presidents.   Insight: limited/partial awareness of illness  Judgment: limited but agreeable to care    CAM-ICU positive      ASSESSMENT & RECOMMENDATIONS   Delirium, multifactorial  Bipolar disorder    PSYCH MEDICATIONS  Scheduled  - Start Lithium 150mg qHS  - Decrease Zyprexa 10mg qHS  - Stop Trazodone    RISK ASSESSMENT  NO NEED FOR PEC - Patient is agreeable to care, denies SI/HI/AVH    FOLLOW UP  Will follow up while in house  Please follow-up with repeat labs while at SNF: CBC, CMP (BUN, Creatinie), Calcium level and lithium level  Lithium should be between 0.6-1.2mEq/L    DISPOSITION - once medically cleared:  Defer to medical " team  Outpatient psychiatrist Dr. Coates contacted. States he will be able to follow-up with patient virtually from [facility] as early as next week. Will need to make an appointment with him and have someone assist patient with virtual appointment while at [facility]    Please contact ON CALL psychiatry service (24/7) for any acute issues that may arise.    Dr. Priya Sheffield   Psychiatry  [facility]  6/30/2023 11:52 AM        --------------------------------------------------------------------------------------------------------------------------------------------------------------------------------------------------------------------------------------    CONTINUED OBJECTIVE clinical data & findings reviewed and noted for above decision making    Current Medications:   Scheduled Meds:    bacitracin zinc   Topical (Top) TID    furosemide  40 mg Oral BID    lactulose  15 g Oral TID    levetiracetam  1,000 mg Oral Q12H    OLANZapine  15 mg Oral QHS    rifAXIMin  550 mg Oral BID    silodosin  4 mg Oral Daily    spironolactone  50 mg Oral Daily    traZODone  50 mg Oral QHS     PRN Meds: sodium chloride, albuterol-ipratropium, dextrose 10%, dextrose 10%, dextrose, dextrose, hydrOXYzine HCL, naloxone, ondansetron, oxyCODONE, simethicone, sodium chloride 0.9%    Allergies:   Review of patient's allergies indicates:   Allergen Reactions    Sulfa (sulfonamide antibiotics) Rash    Codeine Nausea And Vomiting       Vitals  Vitals:    06/30/23 1106   BP: (!) 119/53   Pulse: 98   Resp: 19   Temp: 98.2 °F (36.8 °C)       Labs/Imaging/Studies:  Recent Results (from the past 24 hour(s))   Comprehensive metabolic panel    Collection Time: 06/30/23  4:39 AM   Result Value Ref Range    Sodium 137 136 - 145 mmol/L    Potassium 3.6 3.5 - 5.1 mmol/L    Chloride 107 95 - 110 mmol/L    CO2 21 (L) 23 - 29 mmol/L    Glucose 137 (H) 70 - 110 mg/dL    BUN 8 6 - 20 mg/dL    Creatinine 0.4 (L) 0.5 - 1.4 mg/dL    Calcium 9.0 8.7 - 10.5  mg/dL    Total Protein 5.7 (L) 6.0 - 8.4 g/dL    Albumin 2.7 (L) 3.5 - 5.2 g/dL    Total Bilirubin 6.6 (H) 0.1 - 1.0 mg/dL    Alkaline Phosphatase 141 (H) 55 - 135 U/L    AST 39 10 - 40 U/L    ALT 21 10 - 44 U/L    eGFR >60.0 >60 mL/min/1.73 m^2    Anion Gap 9 8 - 16 mmol/L   Magnesium    Collection Time: 06/30/23  4:39 AM   Result Value Ref Range    Magnesium 1.5 (L) 1.6 - 2.6 mg/dL   Phosphorus    Collection Time: 06/30/23  4:39 AM   Result Value Ref Range    Phosphorus 2.8 2.7 - 4.5 mg/dL   Protime-INR    Collection Time: 06/30/23  4:39 AM   Result Value Ref Range    Prothrombin Time 19.8 (H) 9.0 - 12.5 sec    INR 1.9 (H) 0.8 - 1.2   Type & Screen    Collection Time: 06/30/23  4:39 AM   Result Value Ref Range    Group & Rh O POS     Indirect Ameya NEG     Specimen Outdate 07/03/2023 23:59

## 2023-06-30 NOTE — PLAN OF CARE
Pt participated in therapy session this date with encouragement, though declined OOB mobility due to confusion and anxiety    Problem: Occupational Therapy  Goal: Occupational Therapy Goal  Description: Goals to be met by: 7/26/23    Patient will increase functional independence with ADLs by performing:    Grooming while EOB with Minimum Assistance.  UE Dressing with Minimum Assistance.  LB Dressing with Moderate Assistance.  Toileting from toilet/bedside commode with Moderate Assistance.   Sitting at edge of bed x10 minutes with Minimal Assistance.  Rolling to Bilateral with Minimal Assistance.   Supine to sit with Moderate Assistance.  Toilet transfer to toilet/bedside commode with Moderate Assistance and LRAD as needed.    Outcome: Ongoing, Progressing

## 2023-06-30 NOTE — PT/OT/SLP PROGRESS
Occupational Therapy  Co -  Treatment    Co-evaluation/treatment performed due to patient's multiple deficits requiring two skilled therapists to appropriately and safely assess patient's strength and endurance while facilitating functional tasks in addition to accommodating for patient's activity tolerance.       Name: Marely Hamilton  MRN: [id number]  Admitting Diagnosis:  Non-convulsive status epilepticus  19 Days Post-Op    Recommendations:     Discharge Recommendations: nursing facility, skilled  Discharge Equipment Recommendations:  to be determined by next level of care  Barriers to discharge:  Decreased caregiver support  & increased skilled assistance needed    Assessment:     Marely Hamilton is a 58 y.o. female with a medical diagnosis of Non-convulsive status epilepticus.  She presents with performance deficits affecting function are weakness, impaired endurance, impaired self care skills, impaired functional mobility, gait instability, impaired balance, decreased safety awareness, decreased lower extremity function, decreased upper extremity function, impaired fine motor, decreased coordination, decreased ROM, impaired cardiopulmonary response to activity. Pt participated fairly in therapy session this date, presented with anxiety about OOB mobility and confusion about items in the room this date.  Pt unable to voice understanding of importance of therapy.  She was able to complete bed mobility with total A, and sat EOB with SBA to CGA for ~15min.  Pt attempted STS transfer with total A x2 but could not clear hips from HOB and pt refused to complete stand attempt, presenting with confusion. Pt returned to supine with max A x2.  Pt asked to only hear from one therapist at a time possibly due to overwhelm/processing difficulties.      Pt would benefit from continued therapy for strengthening and stability.     Rehab Prognosis:  Good; patient would benefit from acute skilled OT services to address these  "deficits and reach maximum level of function.       Plan:     Patient to be seen 3 x/week to address the above listed problems via self-care/home management, therapeutic activities, therapeutic exercises, neuromuscular re-education  Plan of Care Expires: 07/26/23  Plan of Care Reviewed with: patient    Subjective     Chief Complaint: "Yesterday I was laying down over there"   Patient/Family Comments/goals: Get better, return home  Pain/Comfort:  Pain Rating 1: 0/10  Pain Rating Post-Intervention 1: 0/10    Objective:     Communicated with: NEHAL Tamayo prior to session.  Patient found supine with peripheral IV, telemetry, pulse ox (continuous), PureWick upon OT entry to room.    General Precautions: Standard, aspiration    Orthopedic Precautions:N/A  Braces: N/A  Respiratory Status: Room air     Occupational Performance:     Bed Mobility:    Patient completed Rolling/Turning to Left with  maximal assistance  Patient completed Rolling/Turning to Right with maximal assistance  Patient completed Scooting/Bridging with total assistance and 2 persons  Patient completed Supine to Sit with total assistance and 2 persons  Patient completed Sit to Supine with maximal assistance and 2 persons   Pt sat EOB ~15min with SBA/CGA when she attempted to lay down prematurely    Functional Mobility/Transfers:  Patient completed Sit <> Stand Transfer with total assistance and of 2 persons  with  hand-held assist though hips unable to clear EOB and pt refused midway to attempt/straighten gluteal area. Pt refused second attempt, stating "What is the purpose of this?" Pt educated in value of therapy      Activities of Daily Living:    - Grooming:  set up assistance washing face with warm washcloth   Upper Body Dressing: maximal assistance affixing gown at neck and back for maximal coverage  Lower Body Dressing: total assistance doffing and donning bilateral socks while managing IV sites  Feeding: applesauce container opened and presented to " pt for self feeding      [facility] 6 Click ADL: 11    Treatment & Education:  Pt educated on role of OT, POC, and goals for therapy.    POC was dicussed with patient/caregiver, who was included in its development and is in agreement with the identified goals and treatment plan.   Patient and family aware of patient's deficits and therapy progression.   Time provided for therapeutic counseling and discussion of health disposition.   Educated on importance of EOB/OOB mobility, maintaining routine, sitting up in chair, and maximizing independence with ADLs during admission   Pt completed ADLs and functional mobility for treatment session as noted above   Pt/caregiver verbalized understanding and expressed no further concerns/questions      Patient left supine with all lines intact, call button in reach, bed alarm on, and RN notified    GOALS:   Multidisciplinary Problems       Occupational Therapy Goals          Problem: Occupational Therapy    Goal Priority Disciplines Outcome Interventions   Occupational Therapy Goal     OT, PT/OT Ongoing, Progressing    Description: Goals to be met by: 7/26/23    Patient will increase functional independence with ADLs by performing:    Grooming while EOB with Minimum Assistance.  UE Dressing with Minimum Assistance.  LB Dressing with Moderate Assistance.  Toileting from toilet/bedside commode with Moderate Assistance.   Sitting at edge of bed x10 minutes with Minimal Assistance.  Rolling to Bilateral with Minimal Assistance.   Supine to sit with Moderate Assistance.  Toilet transfer to toilet/bedside commode with Moderate Assistance and LRAD as needed.                         Time Tracking:     OT Date of Treatment: 06/30/23  OT Start Time: 0926  OT Stop Time: 0949  OT Total Time (min): 23 min    Billable Minutes:Self Care/Home Management 8  Therapeutic Activity 15    OT/DC: OT     Number of DC visits since last OT visit: 1    6/30/2023

## 2023-06-30 NOTE — PT/OT/SLP PROGRESS
"Speech Language Pathology Treatment    Patient Name:  Marely Hamilton   MRN:  [id number]  Admitting Diagnosis: Non-convulsive status epilepticus    Recommendations:                 General Recommendations:  Dysphagia therapy and Cognitive-linguistic therapy  Diet recommendations:  Soft & Bite Sized Diet - IDDSI Level 6, Liquid Diet Level: Thin liquids - IDDSI Level 0   Aspiration Precautions: 1 bite/sip at a time, Assistance with meals, Avoid talking while eating, Eliminate distractions, Feed only when awake/alert, HOB to 90 degrees, Meds crushed in puree, No straws, Small bites/sips, and Strict aspiration precautions   General Precautions: Standard, aspiration, fall  Communication strategies:  provide increased time to answer and go to room if call light pushed    Assessment:     Marely Hamilton is a 58 y.o. female with an SLP diagnosis of Dysphagia and Cognitive-Linguistic Impairment.      Subjective     "Do I look better?"     Pain/Comfort:  Pain Rating 1: 0/10  Pain Rating Post-Intervention 1: 0/10    Respiratory Status: Room air    Objective:     Has the patient been evaluated by SLP for swallowing?   Yes  Keep patient NPO? No     Pt seen bedside, alert and cooperative following session with PT/OT.  Improved alertness and response time today.  Vocal intensity slightly improved today.  Pt observed self feeding thin liquids via cup, puree, and soft solids with no overt s/s aspiration.  Mastication time remained increased though improved compared to yesterday with adequate oral clearance.  Swallow precs, s/s aspiration, risks associated with aspiration, diet recs, and POC reviewed.  Pt able to orient to self and place IND.  Simple convergent naming task completed with 25% accy.  Pt somewhat resistant to cueing.  Impaired sustained attn evident.  Rationale for cognitive stim provided.  Pt agreeable.     Goals:   Multidisciplinary Problems       SLP Goals          Problem: SLP    Goal Priority Disciplines Outcome "   SLP Goal     SLP Ongoing, Progressing   Description: Goals due 7/5  1.  Pt. Will participate in ongoing assessment of swallow to initiate least restrictive diet    2.  Assess functional reading and writing skills  3.  [place] to month, year and place  4.  Respond to verbal problem solving tasks with 80% accuracy with min cues  5.  REspond to categorization /organization tasks with 80% accuracy with mod cues                   Multidisciplinary Problems (Resolved)          Problem: SLP    Goal Priority Disciplines Outcome   SLP Goal   (Resolved)     SLP Met                       Plan:     Patient to be seen:  4 x/week   Plan of Care expires:  07/24/23  Plan of Care reviewed with:  patient   SLP Follow-Up:  Yes       Discharge recommendations:  nursing facility, skilled   Barriers to Discharge:  Level of Skilled Assistance Needed      Time Tracking:     SLP Treatment Date:   06/30/23  Speech Start Time:  0947  Speech Stop Time:  1006     Speech Total Time (min):  19 min    Billable Minutes: Speech Therapy Individual 9 and Treatment Swallowing Dysfunction 10    06/30/2023

## 2023-06-30 NOTE — PROGRESS NOTES
[facility] Medicine  Progress note    [place] [facility] [place] [facility] Renee Arciniega MD  Admit Date: 5/28/2023    Principal Problem:  Non-convulsive status epilepticus    Interval hx:  No new complaints    ROS   Respiratory: neg for cough neg for shortness of breath  Cardiovascular: neg for chest pain neg for palpitations  Gastrointestinal: neg for nausea neg for vomiting, neg for abdominal pain neg for diarrhea neg for constipation   Behavioral/Psych: neg for depression neg for anxiety    PEx  Temp:  [98.2 °F (36.8 °C)-98.9 °F (37.2 °C)]   Pulse:  []   Resp:  [18-20]   BP: (119-135)/(53-63)   SpO2:  [92 %-98 %]   No intake or output data in the 24 hours ending 06/30/23 1837    General Appearance: no acute distress, WD, thin, ill-appearing  Heart: regular rate and rhythm, no heave  Respiratory: Normal respiratory effort, symmetric excursion, bilateral vesicular breath sounds   Abdomen: Soft, non-tender; bowel sounds active  Skin: intact, no rash, no ulcers  Neurologic:  No focal numbness or weakness  Mental status: Alert, oriented x 4, affect appropriate     Recent Labs   Lab 06/27/23  0027 06/28/23 0230 06/29/23  0453   WBC 2.95* 4.01 5.47   HGB 8.2* 7.9* 8.9*   HCT 26.3* 25.4* 28.2*   PLT 44* 46* 49*     Recent Labs   Lab 06/28/23 0230 06/29/23 0453 06/30/23  0439    140 137   K 3.9 3.9 3.6   * 107 107   CO2 22* 24 21*   BUN 6 7 8   CREATININE 0.4* 0.4* 0.4*   * 114* 137*   CALCIUM 9.0 9.1 9.0   MG 1.7 1.6 1.5*   PHOS 2.8 2.6* 2.8     Recent Labs   Lab 06/28/23  0230 06/29/23 0453 06/30/23  0439   ALKPHOS 125 155* 141*   ALT 19 22 21   AST 36 42* 39   ALBUMIN 2.6* 2.9* 2.7*   PROT 5.2* 6.2 5.7*   BILITOT 6.8* 8.5* 6.6*   INR 1.9* 1.9* 1.9*        Scheduled Meds:   bacitracin zinc   Topical (Top) TID    furosemide  40 mg Oral BID    lactulose  15 g Oral TID    levetiracetam  1,000 mg Oral Q12H    OLANZapine  15 mg Oral QHS    rifAXIMin  550 mg Oral BID    silodosin  4 mg Oral Daily    spironolactone   50 mg Oral Daily    traZODone  50 mg Oral QHS     Continuous Infusions:  As Needed:  sodium chloride, albuterol-ipratropium, dextrose 10%, dextrose 10%, dextrose, dextrose, hydrOXYzine HCL, naloxone, ondansetron, oxyCODONE, simethicone, sodium chloride 0.9%    Assessment and Plan  / Problems managed today    * Non-convulsive status epilepticus  Acute metabolic encephalopathy  58F with EtOH induced hepatic cirrhosis, seizure disorder on outpatient LEV and ZNS and bipolar disorder admitted on 5/28 for persistent encephalopathy. MRI Brain WO was unremarkable for acute neurologic change. EEG w/ frequent left hemispheric electrographic seizures and NCSE. Treated with levetiracetam 1 g BID, lactulose and rifaximin. Hypercalcemia treated by holding lithium. Proteus mirabilis UTI treated with antibiotic course. Improved to where she no epileptiform discharges on EEG x 24.     Acute respiratory failure with hypoxia  6/2 Intubated for airway protection. Patient has had pulse ox readings of 88% on respiratory accordion. Extubated 6/16. Now on room air.    Acute metabolic encephalopathy  Multifactorial  Hepatic encephalopathy  Proteus mirabilis UTI  Non-convulsive status epilepticus  Improving  Delirium precautions    Urinary tract infection due to Proteus  Completed seven days of ceftriaxone    Hepatic encephalopathy  Chronic, unstable. Persistent confusion, grade 2  Elevated ammonia on admission. Started on lactulose and rifaximin. Ammonia followed and is now at normal levels. Goal for BM > 3 a day.    Acute deep vein thrombosis (DVT) of brachial vein of right upper extremity  Deep vein thrombosis (DVT) of femoral vein of right lower extremity  Right IJ DVT  Initially treated with argatroban and then transitioned to heparin. Stopped due to development of retroperitoneal hemorrhage. IVC filter placed 6/14. Anticoagulation held since then due to thrombocytpoenia,     Acute blood loss anemia  Large Right retroperitoneal  bleed  Hypovolemic shock  Anemia of chronic disease  Received about 9 units PRBCs, 11 platelet doses, 3 FFP, 2 cryoprecipitate  Patient had embolization of left lumbar and internal iliac branch by IR 6/13  Hgb stable in 8s  Previously in epoietin    Thrombocytopenia  Chronic issue due to liver failure  S/p  11 platelet doses, 3 FFP, 2 cryoprecipitate    Acute liver failure with hepatic coma  Ascites  MELD-Na: 21 at 6/30/2023  4:39 AM  MELD: 21 at 6/30/2023  4:39 AM  Calculated from:  Serum Creatinine: 0.4 mg/dL (Using min of 1 mg/dL) at 6/30/2023  4:39 AM  Serum Sodium: 137 mmol/L at 6/30/2023  4:39 AM  Total Bilirubin: 6.6 mg/dL at 6/30/2023  4:39 AM  INR(ratio): 1.9 at 6/30/2023  4:39 AM    Resume furosemide 40 mg BID and spironolactone 50 mg qhs  Refer patient back to outpatient hepatology    Alcohol abuse, in remission  PETH pending    Bipolar 1 disorder  Psychiatry consulted and assisted in adjusting medications  Psychiatry consulted to consider restarting home medications  Continue olanzapine 15 mg qhs and trazodone 50 mg qhs  Lithium on hold due to slight elevation and associated hypercalcemia    Severe protein-calorie malnutrition  Nutrition consulted. Most recent weight and BMI monitored-     Measurements:  Wt Readings from Last 1 Encounters:   06/29/23 59.9 kg (132 lb 0.9 oz)   Body mass index is 24.15 kg/m².    Patient has been screened and assessed by RD.    Malnutrition Type:  Context: social/environmental circumstances  Level: severe    Malnutrition Characteristic Summary:  Weight Loss (Malnutrition):  (23% x 4 months)  Energy Intake (Malnutrition): less than or equal to 75% for greater than or equal to 1 month  Subcutaneous Fat (Malnutrition): severe depletion (suspecting)  Muscle Mass (Malnutrition): severe depletion  Fluid Accumulation (Malnutrition): other (see comments) (mild-moderate)    Interventions/Recommendations (treatment strategy):  1. Continue Dysphagia soft diet, texture per SLP. 2. Add  Boost Plus ONS & encourage PO intake as tolerated. 3. RD to monitor & follow-up.    Macrocytic anemia         Discharge Planning   BRAYAN: 6/30/2023     Code Status: Full Code   Is the patient medically ready for discharge?: No    Reason for patient still in hospital (select all that apply): Patient trending condition, Consult recommendations, and Pending disposition  Discharge Plan A: Skilled Nursing Facility, Other (family prefers [facility] if at all possible. otherwise, [facility])   Discharge Delays: None known at this time    Diet:  low sodium dysphagia soft diet. Boost plus TID with meals  GI PPx: not needed  DVT PPx:  SCD/SINCERE, s/p IVC. AC held due to thrombocytopenia  Airways: room air  Wounds: nasal bridge skin tear, skin tears on BUE    Goals of Care:  Return to prior functional status       Time (minutes) spent in care of the patient including review of tests, flow sheets and notes since last visit, face to face contact, placing orders, communicating with consultants if needed, care coordination, and documentation: 65 min.    Renee Arciniega MD

## 2023-07-01 PROBLEM — J96.01 ACUTE RESPIRATORY FAILURE WITH HYPOXIA: Status: RESOLVED | Noted: 2023-05-28 | Resolved: 2023-07-01

## 2023-07-01 PROBLEM — N39.0 URINARY TRACT INFECTION DUE TO PROTEUS: Status: RESOLVED | Noted: 2021-11-03 | Resolved: 2023-07-01

## 2023-07-01 PROBLEM — B96.4 URINARY TRACT INFECTION DUE TO PROTEUS: Status: RESOLVED | Noted: 2021-11-03 | Resolved: 2023-07-01

## 2023-07-01 PROCEDURE — 11000004 HC SNF PRIVATE

## 2023-07-01 PROCEDURE — 97167 OT EVAL HIGH COMPLEX 60 MIN: CPT

## 2023-07-01 PROCEDURE — 99900035 HC TECH TIME PER 15 MIN (STAT)

## 2023-07-01 PROCEDURE — 25000003 PHARM REV CODE 250: Performed by: HOSPITALIST

## 2023-07-01 PROCEDURE — 97162 PT EVAL MOD COMPLEX 30 MIN: CPT

## 2023-07-01 PROCEDURE — 27000221 HC OXYGEN, UP TO 24 HOURS

## 2023-07-01 PROCEDURE — 94761 N-INVAS EAR/PLS OXIMETRY MLT: CPT

## 2023-07-01 PROCEDURE — 97535 SELF CARE MNGMENT TRAINING: CPT

## 2023-07-01 PROCEDURE — 25000003 PHARM REV CODE 250: Performed by: FAMILY MEDICINE

## 2023-07-01 RX ORDER — BACITRACIN 500 [USP'U]/G
OINTMENT TOPICAL 3 TIMES DAILY
Status: DISCONTINUED | OUTPATIENT
Start: 2023-07-01 | End: 2023-07-06 | Stop reason: HOSPADM

## 2023-07-01 RX ORDER — NYSTATIN AND TRIAMCINOLONE ACETONIDE 100000; 1 [USP'U]/G; MG/G
CREAM TOPICAL 2 TIMES DAILY
Status: DISCONTINUED | OUTPATIENT
Start: 2023-07-01 | End: 2023-07-02

## 2023-07-01 RX ORDER — MICONAZOLE NITRATE 2 %
POWDER (GRAM) TOPICAL 2 TIMES DAILY
Status: DISCONTINUED | OUTPATIENT
Start: 2023-07-01 | End: 2023-07-06 | Stop reason: HOSPADM

## 2023-07-01 RX ADMIN — BACITRACIN 1 EACH: 500 OINTMENT TOPICAL at 09:07

## 2023-07-01 RX ADMIN — FUROSEMIDE 40 MG: 40 TABLET ORAL at 05:07

## 2023-07-01 RX ADMIN — LEVETIRACETAM 1000 MG: 500 TABLET, FILM COATED ORAL at 09:07

## 2023-07-01 RX ADMIN — Medication 6 MG: at 08:07

## 2023-07-01 RX ADMIN — LEVETIRACETAM 1000 MG: 500 TABLET, FILM COATED ORAL at 08:07

## 2023-07-01 RX ADMIN — SPIRONOLACTONE 50 MG: 25 TABLET, FILM COATED ORAL at 09:07

## 2023-07-01 RX ADMIN — SENNOSIDES AND DOCUSATE SODIUM 1 TABLET: 50; 8.6 TABLET ORAL at 08:07

## 2023-07-01 RX ADMIN — BACITRACIN 1 EACH: 500 OINTMENT TOPICAL at 03:07

## 2023-07-01 RX ADMIN — LACTULOSE 20 G: 20 SOLUTION ORAL at 08:07

## 2023-07-01 RX ADMIN — FUROSEMIDE 40 MG: 40 TABLET ORAL at 09:07

## 2023-07-01 RX ADMIN — RIFAXIMIN 550 MG: 550 TABLET ORAL at 09:07

## 2023-07-01 RX ADMIN — LACTULOSE 20 G: 20 SOLUTION ORAL at 03:07

## 2023-07-01 RX ADMIN — LITHIUM CARBONATE 150 MG: 150 CAPSULE, GELATIN COATED ORAL at 08:07

## 2023-07-01 RX ADMIN — OLANZAPINE 10 MG: 2.5 TABLET, FILM COATED ORAL at 08:07

## 2023-07-01 RX ADMIN — FOLIC ACID 1 MG: 1 TABLET ORAL at 09:07

## 2023-07-01 RX ADMIN — RIFAXIMIN 550 MG: 550 TABLET ORAL at 08:07

## 2023-07-01 RX ADMIN — LACTULOSE 20 G: 20 SOLUTION ORAL at 09:07

## 2023-07-01 RX ADMIN — PROPRANOLOL HYDROCHLORIDE 20 MG: 10 TABLET ORAL at 09:07

## 2023-07-01 NOTE — ASSESSMENT & PLAN NOTE
Large Right retroperitoneal bleed  Hypovolemic shock  Anemia of chronic disease  Received about 9 units PRBCs, 11 platelet doses, 3 FFP, 2 cryoprecipitate  Patient had embolization of left lumbar and internal iliac branch by IR 6/13  Hgb stable in 8s  Previously in epoietin

## 2023-07-01 NOTE — PLAN OF CARE
Problem: Physical Therapy  Goal: Physical Therapy Goal  Description: Goals to be met by: 2023     Patient will increase functional independence with mobility by performin. Supine to sit with Stand-by Assistance  2. Sit to supine with Stand-by Assistance  3. Rolling to Left and Right with Stand-by Assistance.  4. Sit to stand transfer with Minimal Assistance  5. Bed to chair transfer with Minimal Assistance   6. Wheelchair propulsion x 50 feet with Stand-by Assistance using bilateral upper extremities  7. Sitting at edge of bed x 25 minutes with Supervision  8. Stand for 2 minutes with Stand-by Assistance using Rolling Walker    Outcome: Ongoing, Progressing     PT evaluated and made goals for Marely Cardosoelle today. PT will add goals for gait if patient progresses with standing.

## 2023-07-01 NOTE — PLAN OF CARE
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, Laennec's  No date: Depression  No date: Encounter for blood transfusion  6/15/2015: Epistaxis  No date: Fatigue  No date: Glaucoma  No date: Hematuria  6/15/2015: Hepatic encephalopathy  No date: Hepatic enlargement  No date: History of psychiatric care  No date: History of psychiatric hospitalization  6/15/2015: History of seizure      Comment:  1  6/15/2015: hx of intentional Tylenol overdose      Comment:  2005- situational and hx of bipolar  No date: Hx of psychiatric care  9/18/2015: Macrocytic anemia      Comment:  6 units PRBC since June 2015  No date: Jeana  No date: Osteoarthritis  6/15/2015: Other ascites  No date: Psychiatric exam requested by authority  No date: Psychiatric problem  9/26/2019: Psychosis  No date: Renal disorder  No date: Seizures  No date: Self-harming behavior  No date: Suicide attempt  No date: Therapy  6/15/2015: Thrombocytopenia    Past Surgical History:  No date: COSMETIC SURGERY  No date: ESOPHAGOGASTRODUODENOSCOPY    Individualization:   1. Patient likes to listen to music.    Restraints:   1. Bilateral soft wrist restraints initiated 6/6/23 1930 for pulling of lines. [facility] for DC criteria.         Outcome: Ongoing, Progressing  Goal: Absence of Hospital-Acquired Illness or Injury  Outcome: Ongoing, Progressing  Goal: Optimal Comfort and Wellbeing  Outcome: Ongoing, Progressing  Goal: Readiness for Transition of Care  Outcome: Ongoing, Progressing     Problem: Fluid and  Electrolyte Imbalance (Acute Kidney Injury/Impairment)  Goal: Fluid and Electrolyte Balance  Outcome: Ongoing, Progressing     Problem: Oral Intake Inadequate (Acute Kidney Injury/Impairment)  Goal: Optimal Nutrition Intake  Outcome: Ongoing, Progressing     Problem: Renal Function Impairment (Acute Kidney Injury/Impairment)  Goal: Effective Renal Function  Outcome: Ongoing, Progressing

## 2023-07-01 NOTE — ASSESSMENT & PLAN NOTE
Nutrition consulted. Most recent weight and BMI monitored-     Measurements:  Wt Readings from Last 1 Encounters:   06/29/23 59.9 kg (132 lb 0.9 oz)   Body mass index is 24.15 kg/m².    Patient has been screened and assessed by RD.    Malnutrition Type:  Context: social/environmental circumstances  Level: severe    Malnutrition Characteristic Summary:  Weight Loss (Malnutrition):  (23% x 4 months)  Energy Intake (Malnutrition): less than or equal to 75% for greater than or equal to 1 month  Subcutaneous Fat (Malnutrition): severe depletion (suspecting)  Muscle Mass (Malnutrition): severe depletion  Fluid Accumulation (Malnutrition): other (see comments) (mild-moderate)    Interventions/Recommendations (treatment strategy):  1. Continue Dysphagia soft diet, texture per SLP. 2. Add Boost Plus ONS & encourage PO intake as tolerated. 3. RD to monitor & follow-up.

## 2023-07-01 NOTE — NURSING
"Attempted to wash patient's hair and change dressing on patient's chest, patient refused. Patient stated "I don't want you to touch me".  "

## 2023-07-01 NOTE — ASSESSMENT & PLAN NOTE
Deep vein thrombosis (DVT) of femoral vein of right lower extremity  Right IJ DVT  Initially treated with argatroban and then transitioned to heparin. Stopped due to development of retroperitoneal hemorrhage. IVC filter placed 6/14. Anticoagulation held since then due to thrombocytpoenia,

## 2023-07-01 NOTE — ASSESSMENT & PLAN NOTE
Ascites  MELD-Na: 21 at 6/30/2023  4:39 AM  MELD: 21 at 6/30/2023  4:39 AM  Calculated from:  Serum Creatinine: 0.4 mg/dL (Using min of 1 mg/dL) at 6/30/2023  4:39 AM  Serum Sodium: 137 mmol/L at 6/30/2023  4:39 AM  Total Bilirubin: 6.6 mg/dL at 6/30/2023  4:39 AM  INR(ratio): 1.9 at 6/30/2023  4:39 AM    Resume furosemide 40 mg BID and spironolactone 50 mg qhs  Refer patient back to outpatient hepatology

## 2023-07-01 NOTE — CARE UPDATE
..Pt arrived to floor for skilled services with admitting diagnosis of Epilepsy, via stretcher. Pt is AAOx1, Eddie placed in room, patient is incontinent of B/B. Pt is on a low sodium diet. Wound care nurse consult for multiple skin tears and sacrum wound. Family informed of arrival. POC reviewed with patient. Pt denies pain at this time and is educated to use call light and not get OOB w/o assistance

## 2023-07-01 NOTE — PLAN OF CARE
Problem: Occupational Therapy  Goal: Occupational Therapy Goal  Description: Goals to be met by: 08/01/23     Patient will increase functional independence with ADLs by performing:    UE Dressing with Minimal Assistance.  LE Dressing with Moderate Assistance.  Grooming while seated at sink with Minimal Assistance.  Toileting from bedside commode with Maximum Assistance for hygiene and clothing management.   Toilet transfer to bedside commode with Moderate Assistance.  Upper extremity exercise program 3x15 reps per handout, with assistance as needed to increase UE strength/endurance and coordination to improve func mobility and ADL skills.    Outcome: Ongoing, Progressing     Pt was agreeable to OT evaluation, but noted with decreased participation in eval due to decreased attention to task, difficulty with initiation, difficulty following simple commands, and weakness.  Pt unable to provide historical information for evaluation, however, per chart review, [facility]  Pt is currently requires mod A to total assist with all ADLS and func mobility tasks. Maximum physical assistance x2 people is needed for all transfers/standing.  Goals established to assist [facility] and func mobility.  Pt will benefit from skilled OT services in order to increase her level of safety and independence with ADLs and mobility.      Pooja Enriquez, OT  7/1/2023

## 2023-07-01 NOTE — PLAN OF CARE
Problem: Oral Intake Inadequate (Acute Kidney Injury/Impairment)  Goal: Optimal Nutrition Intake  6/30/2023 1917 by Belkis Chin RN  Outcome: Ongoing, Progressing  6/30/2023 1917 by Belkis Chin RN  Outcome: Ongoing, Progressing     Problem: Renal Function Impairment (Acute Kidney Injury/Impairment)  Goal: Effective Renal Function  6/30/2023 1917 by Belkis Chin RN  Outcome: Ongoing, Progressing  6/30/2023 1917 by Belkis Chin RN  Outcome: Ongoing, Progressing     Problem: Infection  Goal: Absence of Infection Signs and Symptoms  6/30/2023 1917 by Belkis Chin RN  Outcome: Ongoing, Progressing  6/30/2023 1917 by Belkis Chin RN  Outcome: Ongoing, Progressing   Safety interventions maintained. Poc reviewed. Questions and concerns answered. Care ongoing.

## 2023-07-01 NOTE — ASSESSMENT & PLAN NOTE
6/2 Intubated for airway protection. Patient has had pulse ox readings of 88% on respiratory accordion. Extubated 6/16. Now on room air.

## 2023-07-01 NOTE — PT/OT/SLP EVAL
Occupational Therapy   Evaluation & Tx    Name: Marely Hamilton  MRN: [id number]  Admit Date: 6/30/2023  Recent Surgeries:      General Precautions: Standard, fall, aspiration  Orthopedic Precautions:N/A   Braces: N/A    Recommendations:     Discharge Recommendations:  (TBD - pt requires significant physical assistance at this time for 24/7)  Level of Assistance Recommended: 24 hours significant assistance  Discharge Equipment Recommendations:   (TBD)  Barriers to discharge:  Decreased caregiver support, Inaccessible home environment    Assessment:     Marely Hamilton is a 58 y.o. female with a medical diagnosis of <principal problem not specified>.  She presents with the following performance deficits affecting function: weakness, impaired endurance, impaired sensation, impaired self care skills, impaired functional mobility, gait instability, impaired balance, impaired cognition, decreased coordination, decreased upper extremity function, decreased lower extremity function, decreased safety awareness, impaired coordination, impaired skin.  Pt was agreeable to OT evaluation, but noted with decreased participation in eval due to decreased attention to task, difficulty with initiation, difficulty following simple commands, and weakness.  Pt unable to provide historical information for evaluation, however, per chart review, pt was mod I at [facility] using DME.  Pt is currently requires mod A to total assist with all ADLS and func mobility tasks. Maximum physical assistance x2 people is needed for all transfers/standing.  Goals established to assist pt with returning to [facility] regarding ADLs and func mobility.  Pt will benefit from skilled OT services in order to increase her level of safety and independence with ADLs and mobility.      Rehab Potential is poor    Activity Tolerance: Poor    Plan:     Patient to be seen 5 x/week to address the above listed problems via self-care/home management, therapeutic activities,  therapeutic exercises, cognitive retraining  Plan of Care Expires: 07/31/23  Plan of Care Reviewed with: patient    Subjective     Chief Complaint: none given  Patient/Family Comments/goals: none given    Occupational Profile: pt is poor historian - unable to answer any questions - information below obtained from chart review  Living Environment: pt was living in [facility] with mother - mother was receiving caregiver assistance 6-7 days/week - pt's mother has passed away during this pt's hospitalization (pt is unaware of this) - THE to enter home - WIS with bench and GB  Previous level of function: mod I using DME  Roles and Routines: daughter and sister  Equipment Used at Home: wheelchair, walker, rolling, bedside commode, shower chair (information from chart review - pt unable to answer)  Assistance upon Discharge: sister will assist with transition home - will look at caregiver services to assist at discharge    Pain/Comfort:  Pain Rating 1: 0/10  Pain Rating Post-Intervention 1: 0/10    Patients cultural, spiritual, Tenriism conflicts given the current situation: no    Objective:     Communicated with: nurse prior to session.  Patient found HOB elevated with oxygen upon OT entry to room.    Occupational Performance:      07/01/23 1356   Eating   Assistance Needed Physical assistance   Physical Assistance Level 26%-50%   Comment Mod A - able to  cup to drink, but needed A to initiate scooping food to eat   CARE Score - Eating 3   Oral Hygiene   Assistance Needed Physical assistance   Physical Assistance Level 51%-75%   Comment Max A with all grooming tasks - total A with oral care (gums bleeding) and max A with washing face and brushing hair   CARE Score - Oral Hygiene 2   Toileting Hygiene   Assistance Needed Physical assistance   Physical Assistance Level Total assistance   Comment dependent   CARE Score - Toileting Hygiene 1   Shower/Bathe Self   Assistance Needed Physical assistance   Physical Assistance  Level Total assistance   Comment dependent - attempted Inupiat assist to initiate, pt did not complete once OT released wash cloth   CARE Score - Shower/Bathe Self 1   Upper Body Dressing   Assistance Needed Physical assistance   Physical Assistance Level 76% or more   Comment Max A - pt attempted to insert L UE into sleeve but needed A to complete this step and remaining steps   CARE Score - Upper Body Dressing 2   Lower Body Dressing   Assistance Needed Physical assistance   Physical Assistance Level Total assistance   Comment total A for pants   CARE Score - Lower Body Dressing 1   Putting On/Taking Off Footwear   Assistance Needed Physical assistance   Physical Assistance Level Total assistance   Comment dependent with socks   CARE Score - Putting On/Taking Off Footwear 1   Roll Left and Right   Assistance Needed Physical assistance   Physical Assistance Level Total assistance   Comment total A to roll L/R - used draw sheet   CARE Score - Roll Left and Right 1   Lying to Sitting on Side of Bed   Assistance Needed Physical assistance   Physical Assistance Level Total assistance   Comment need A with every step   CARE Score - Lying to Sitting on Side of Bed 1   Sit to Stand   Assistance Needed Physical assistance   Physical Assistance Level Total assistance   Comment max A x2   CARE Score - Sit to Stand 1   Chair/Bed-to-Chair Transfer   Assistance Needed Physical assistance   Physical Assistance Level Total assistance   Comment max A x2   CARE Score - Chair/Bed-to-Chair Transfer 1   Toilet Transfer   Reason if not Attempted Safety concerns   CARE Score - Toilet Transfer 88     Cognitive/Visual Perceptual:  Cognitive/Psychosocial Skills:     -       Oriented to: self only - able to verbalize year only for time   -       Follows Commands/attention:Inattentive, Easily distracted, and not following 95% of simple commands  -       Communication: delayed response  -       Memory: Impaired STM and Impaired LTM  -        Safety awareness/insight to disability: impaired   -       Mood/Affect/Coping skills/emotional control: impaired awareness and interaction    Physical Exam:  Balance: -       poor sitting and standing  Postural examination/scapula alignment:    -       Rounded shoulders  -       Forward head  -       Posterior pelvic tilt  Skin integrity: Bruising of arms/legs  Edema:  Mild LEs  Upper Extremity Range of Motion:   B UE = WFL for ADLs via observation of movements - not following commands  Upper Extremity Strength: unable to formally assess - noted moveing against gravity but not to endrange   Strength:  poor    AMPAC 6 Click ADL:  AMPAC Total Score: 8    Treatment & Education:  Pt completed ADLs and func mobility activities for tx session as noted above  Whiteboard updated  Pt educated on role of OT and POC      Patient left up in chair with all lines intact and call button in reach with PT    GOALS:   Multidisciplinary Problems       Occupational Therapy Goals          Problem: Occupational Therapy    Goal Priority Disciplines Outcome Interventions   Occupational Therapy Goal     OT, PT/OT Ongoing, Progressing    Description: Goals to be met by: 08/01/23     Patient will increase functional independence with ADLs by performing:    UE Dressing with Minimal Assistance.  LE Dressing with Moderate Assistance.  Grooming while seated at sink with Minimal Assistance.  Toileting from bedside commode with Maximum Assistance for hygiene and clothing management.   Toilet transfer to bedside commode with Moderate Assistance.  Upper extremity exercise program 3x15 reps per handout, with assistance as needed to increase UE strength/endurance and coordination to improve func mobility and ADL skills.                         History:     Past Medical History:   Diagnosis Date    Addiction to drug     Alcohol abuse     Alcohol abuse, in remission 6/15/2015    14.5 weeks ago; AA weekly    Anemia     Anxiety 6/15/2015    Behavioral  problem     Bipolar disorder     Bipolar disorder in remission 6/15/2015    Cirrhosis, Laennec's 6/15/2015    Depression     Encounter for blood transfusion     Epistaxis 6/15/2015    Fatigue     Glaucoma     Hematuria     Hepatic encephalopathy 6/15/2015    Hepatic enlargement     History of psychiatric care     History of psychiatric hospitalization     History of seizure 6/15/2015    1    hx of intentional Tylenol overdose 6/15/2015    2005- situational and hx of bipolar    Hx of psychiatric care     Macrocytic anemia 9/18/2015    6 units PRBC since June 2015    Jeana     Osteoarthritis     Other ascites 6/15/2015    Psychiatric exam requested by authority     Psychiatric problem     Psychosis 9/26/2019    Renal disorder     Seizures     Self-harming behavior     Suicide attempt     Therapy     Thrombocytopenia 6/15/2015         Past Surgical History:   Procedure Laterality Date    COSMETIC SURGERY      EMBOLIZATION N/A 6/11/2023    Procedure: EMBOLIZATION, BLOOD VESSEL;  Surgeon: Debbie Surgeon;  Location: [facility];  Service: Anesthesiology;  Laterality: N/A;    ESOPHAGOGASTRODUODENOSCOPY         Time Tracking:     OT Date of Treatment: 07/01/23  OT Start Time: 0745  OT Stop Time: 0834  OT Total Time (min): 49 min (PT present for ending of session to assist with transfer OOB)    Billable Minutes:Evaluation 10  Self Care/Home Management 39    7/1/2023

## 2023-07-01 NOTE — ASSESSMENT & PLAN NOTE
Chronic, unstable. Persistent confusion, grade 2  Elevated ammonia on admission. Started on lactulose and rifaximin. Ammonia followed and is now at normal levels. Goal for BM > 3 a day.

## 2023-07-01 NOTE — PROGRESS NOTES
[facility]                                  Skilled Nursing Facility                   Progress Note     Admit Date: 6/30/2023  BRAYAN   Principal Problem:  Non-convulsive status epilepticus   HPI obtained from patient interview and chart review     Chief Complaint: Establish Care/ Initial Visit     HPI: Marely Hamilton is a 58 y.o. female with past psychiatric history of bipolar disorder, alcohol use disorder & past pertinent medical history of seizure disorder (on AEDs), ETOH cirrhosis presents to the ED/admitted to hospital on 5/28 for Acute encephalopathy.     Frequent left hemispheric electrographic seizures and NCSE on EEG. Treated with Keppra, lactulose and rifaximin. Intubated for airway protection; extubated 6/16. Completed rocephin x 7 days for Proteus mirabilis UTI. No epileptiform discharges on EEG x 24. Acute DVTs Right brachial, femoral, and IJ. Tx argatroban-->heparin then developed large retroperitoneal hemorrhage requiring 9 units PRBCs, 11 platelet doses, 3 FFP, 2 cryoprecipitate. Now S/P IR embolization of left lumbar and internal iliac branch and S/P IVC filter placement.     Hypercalcemia treated by holding lithium. H/O severe episodes of crystal with rapid decompensation.  Previously very stable on Lithium for mood stabilization. Per psych consult 6/30: Continue lithium 150mg qhs, stop trazodone, and decrease q HS Zyprexa from 10 to 5 mg as early as 7/2. Goal lithium level 0.6-1.2mEq/L. Check lithium level Sunday 7/2 and monitor renal function. Follow-up with outpt [facility], Dr. Coates, as early as 7/3.    Patient will be treated at [facility] with PT and OT to improve functional status and ability to perform ADLs. Consults placed to RT, RD, SLP, PT/OT, Wound Care, Case Management, TelePsychiatry. Sister plans to come to [place] to assist with transition from SNF to home with sitter with long-term goal of moving patient  to [place] to sister.     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    Past Surgical History: Patient has a past surgical history that includes Esophagogastroduodenoscopy; Cosmetic surgery; and Embolization (N/A, 6/11/2023).    Social History: Patient reports that she has never smoked. She has never used smokeless tobacco. She reports that she does not currently use alcohol. She reports that she does not use drugs.    Family History: family history includes Alcohol abuse in her father, paternal grandfather, and sister; Bipolar disorder in her father; Hypertension in her mother; Suicide in her father.    Allergies: Patient is allergic to sulfa (sulfonamide antibiotics) and codeine.    ROS  Constitutional: Negative for fever   Eyes: Negative for blurred vision, double vision   Respiratory: Negative for cough, occasional shortness of breath   Cardiovascular: Negative for chest pain, palpitations, and leg swelling.   Gastrointestinal: Negative for abdominal pain, constipation, diarrhea, nausea, vomiting. + decreased appetite  Genitourinary: Negative for dysuria, frequency   Musculoskeletal: +  generalized weakness. Negative for back pain and myalgias.   Skin: Negative for itching and rash. + wounds, bruising  Neurological: Negative for dizziness, headaches. + memory loss  Psychiatric/Behavioral: The  is not nervous/anxious.      24 hour Vital Sign Range   Temp:  [98.3 °F (36.8 °C)-99.1 °F (37.3 °C)]   Pulse:  []   Resp:  [16-18]   BP: (122-135)/(57-62)   SpO2:  [88 %-97 %]     Current BMI: Body mass index is 20.6 kg/m².    PEx  Constitutional: Patient appears catechetic, disheveled, chronically ill, debilitated.  No acute distress noted  HENT:   Head: Normocephalic.   Eyes: Pupils are equal, round  Neck: Normal range of motion.   Cardiovascular: Normal rate, regular rhythm and normal heart sounds.    Pulmonary/Chest: Effort normal and breath sounds are clear. 3L NC.  Abdominal: Soft. Bowel sounds are active.   Musculoskeletal: generalized weakness  Neurological: Alert and oriented to person and place. Confused. Inattention. RUE tremor.  Psychiatric: Flat affect.   Skin: Skin is warm and dry. Full skin assessment completed bu NP on initial exam. Extensive generalized  bruising in various stages of healing. Multiple active wounds as follows:       Altered Skin Integrity 06/03/23 1300  Groin Moisture associated dermatitis (Active) POA   Location: Groin   Is this injury device related?: No   Primary Wound Type: Moisture ass   Wound Image     Description of Altered Skin Integrity Intact skin with non-blanchable redness of localized area   Dressing Appearance Open to air   Drainage Amount None   Drainage Characteristics/Odor No odor   Appearance Red;Moist   Tissue loss description Partial thickness   Wound Edges Irregular            Altered Skin Integrity 06/03/23 1900 Right Throat Blister(s) Purple or maroon localized area of discolored intact skin or non-intact skin or a blood-filled blister. (Active) POA   Side: Right   Location: Throat   Is this injury device related?: No   Primary Wound Type:  Blister(s)   Description of Altered Skin Integrity: Purple or maroon localized area of discolored intact skin or non-intact skin or a blood-filled blister.   Description of Altered Skin Integrity Intact skin with non-blanchable redness of localized area   Dressing Appearance Open to air   Drainage Amount None   Drainage Characteristics/Odor No odor   Appearance Dry;Maroon            Altered Skin Integrity 06/05/23 1154 Left posterior Greater trochanter Moisture associated dermatitis (Active) POA   Side: Left   Orientation: posterior   Location: Greater trochanter   Primary Wound Type: Moisture ass   Description of Altered Skin Integrity Intact skin with non-blanchable redness of localized area   Dressing Appearance Open to air   Drainage Amount None   Drainage Characteristics/Odor No odor   Appearance Pink;Red   Tissue loss description Partial thickness   Periwound Area Pink;Moist            Altered Skin Integrity 06/05/23 1154 Right posterior Greater trochanter Moisture associated dermatitis (Active) POA   Side: Right   Orientation: posterior   Location: Greater trochanter   Primary Wound Type: Moisture ass   Wound Image     Description of Altered Skin Integrity Intact skin with non-blanchable redness of localized area   Dressing Appearance Open to air   Drainage Amount None   Drainage Characteristics/Odor No odor   Appearance Red   Tissue loss description Partial thickness   Periwound Area Pink;Moist            Altered Skin Integrity 06/18/23 2100 medial Nose Blister(s) Intact skin with non-blanchable redness of localized area (Active) POA   Orientation: medial   Location: Nose   Primary Wound Type: Blister(s)   Description of Altered Skin Integrity: Intact skin with non-blanchable redness of localized area   Wound Image     Description of Altered Skin Integrity Full thickness tissue loss. Base is covered by slough and/or eschar in the wound bed   Dressing Appearance Open to air;Dried drainage   Drainage Amount  None   Drainage Characteristics/Odor No odor   Appearance Dry   Tissue loss description Partial thickness   Periwound Area Redness   Wound Edges Irregular            Altered Skin Integrity 06/25/23 1001 midline Frontal region Other (comment) Full thickness tissue loss. Subcutaneous fat may be visible but bone, tendon or muscle are not exposed (Active) POA   Orientation: midline   Location: Frontal region   Primary Wound Type: Other   Description of Altered Skin Integrity: Full thickness tissue loss. Subcutaneous fat may be visible but bone, tendon or muscle are not exposed   Description of Altered Skin Integrity Partial thickness tissue loss. Shallow open ulcer with a red or pink wound bed, without slough. Intact or Open/Ruptured Serum-filled blister.   Dressing Appearance Open to air   Drainage Amount None   Drainage Characteristics/Odor No odor   Appearance Dry   Tissue loss description Partial thickness            Altered Skin Integrity 06/27/23 0506 upper Sternal Skin Tear (Active) POA   Orientation: upper   Location: Sternal   Primary Wound Type: Skin tear   Wound Image     Description of Altered Skin Integrity Partial thickness tissue loss. Shallow open ulcer with a red or pink wound bed, without slough. Intact or Open/Ruptured Serum-filled blister.   Dressing Appearance Dry;Intact;Clean   Drainage Amount None   Drainage Characteristics/Odor No odor   Appearance Red   Tissue loss description Partial thickness   Red (%), Wound Tissue Color 100 %   Periwound Area Redness;Moist   Wound Edges Irregular   Wound Length (cm) 2 cm   Wound Width (cm) 2 cm   Wound Depth (cm) 0.1 cm   Wound Volume (cm^3) 0.4 cm^3   Wound Surface Area (cm^2) 4 cm^2            Altered Skin Integrity 06/29/23 1523 Sacral spine Moisture associated dermatitis (Active) POA   Location: Sacral spine   Wound Number:    Is this injury device related?:    Primary Wound Type: Moisture ass   Wound Image     Dressing Appearance Open to air   Drainage  "Amount None   Drainage Characteristics/Odor No odor   Appearance Red   Tissue loss description Partial thickness   Periwound Area Moist;Taylor   Wound Edges Irregular   Wounds followed closely by Wound Care.      No results for input(s): GLUCOSE, NA, K, CL, CO2, BUN, CREATININE, MG in the last 24 hours.    Invalid input(s):  CALCIUM    No results for input(s): WBC, RBC, HGB, HCT, PLT, MCV, MCH, MCHC in the last 24 hours.    No results for input(s): POCTGLUCOSE in the last 168 hours.     Assessment and Plan:    Non-convulsive status epilepticus  Maintain seizure precautions  PO Keppra 1G BID  Rifaximin po 550 mg BID  Lactulose po 20 g TID - titrate for > 3 BMs q day     Acute metabolic encephalopathy  Multifactorial; r/t Hepatic encephalopathy & Non-convulsive status epilepticus  Proteus mirabilis UTI, resolved in hospital s/p 7D course rocephin   Maintain Delirium precautions  See "Hepatic encephalopathy" and "Non-convulsive status epilepticus"     Hepatic encephalopathy  Chronic, unstable.  Persistent confusion - worsened from baseline per sister  Elevated ammonia on hospital admit, WNL by discharge.   Continue Lactulose and rifaximin  Titrate therapy prn to goal for BM > 3 a day     Acute respiratory failure with hypoxia  6/2 Intubated for airway protection r/t status epilepticus; extubated 6/16 to RA  Patient requiring 2 to 3 L NC to maintain SpO2 > 90% since SNF admit  RT consulted for oxygen monitoring, titration, and weaning of O2 per sat goal    Acute deep vein thrombosis (DVT) of brachial vein of right upper extremity  Deep vein thrombosis (DVT) of femoral vein of right lower extremity  Right IJ DVT  Recent H/O Large Right retroperitoneal bleed  Argatroban transitioned to heparin then stopped d/t retroperitoneal hemorrhage and thrombocytopenia.   Now S/P IVC filter placed 6/14   Maintain SCDs      Macrocytic anemia   Anemia of chronic disease           Recent H/O Acute blood loss anemia  Recent H/O Large Right " retroperitoneal bleed  Recent H/O Hypovolemic shock  Received 9 units PRBCs, 11 platelet doses, 3 FFP, 2 cryoprecipitate in short term acute  S/P IR embolization of left lumbar and internal iliac branch 6/13  Previously on epoetin  Maintain bleeding precautions  Monitor Hgb with CBC Diff q T/Fri   Transfuse PRBC for Hgb < 7.0 and / or active bleeding     Thrombocytopenia  Chronic, unstable  Due to liver failure  S/p 11 platelet doses, 3 FFP, 2 cryoprecipitate in short term acute  Bleeding precautions  Monitor with routinely scheduled CBC Diff  Transfuse for platelets <15 to <20K if pt develops fever, infection, or inflammation  Prophylactically transfuse for platelets < 10K to prevent spontaneous bleeding      Alcoholic Liver Failure   Ascites d/t liver failure  R/T Alcohol abuse, in remission  Furosemide 40 mg BID   Spironolactone 50 mg qhs  Thiamine 100 mg po daily  Refer patient to outpatient hepatology at discharge or during SNF admit if indicated     Bipolar 1 disorder  As per Psych consult day of hospital discharge 6/30:  - Continue lithium 150mg qhs, stop trazodone  - Decrease q HS Zyprexa from 10 to 5 mg as early as 7/2.   - Goal lithium level 0.6-1.2mEq/L.   - Check lithium level Sunday 7/2 and monitor renal function.   - Follow-up with outpt [facility], Dr. Coates, as early as 7/3.     Severe protein-calorie malnutrition       Body mass index is 18.02 kg/m².       Nutrition consulted.        TID nutritional supplements       MVI, Thiamine, Zinc po daily    Anticipate disposition:  Home with home health      Follow-up needed during SNF stay- Telepsych, possibly hepatology    Follow-up needed after discharge from SNF: PCP, Hepatology, Psychiatry    No future appointments.    I certify that SNF services are required to be given on an inpatient basis because Marely Hamilton needs for skilled nursing care and/or skilled rehabilitation are required on a daily basis and such services can only practically be provided  in a skilled nursing facility setting and are for an ongoing condition for which she received inpatient care in the hospital.     Total time of the visit 122 minutes   Non physical exam/ non charting time: 101 minutes   Description of non physical exam/non charting time: counseling patient on clinical conditions and therapies provided.  Extensive chart review completed including all consultation notes.  All pertinent laboratory and radiographical images reviewed.        Kathy Reno NP  Department of Hospital Medicine   [facility]     DOS: 7/1/2023       Patient note was created using MModal Dictation.  Any errors in syntax or even information may not have been identified and edited on initial review prior to signing this note.

## 2023-07-01 NOTE — ASSESSMENT & PLAN NOTE
Psychiatry consulted and assisted in adjusting medications  Psychiatry consulted to consider restarting home medications  Continue olanzapine 15 mg qhs and trazodone 50 mg qhs  Lithium on hold due to slight elevation and associated hypercalcemia

## 2023-07-01 NOTE — PT/OT/SLP EVAL
Physical Therapy Evaluation    Patient Name:  Marely Hamilton   MRN:  [id number]  Admit Date: 2023  Admitting Diagnosis: Non-convulsive status epilepticus  Recent Surgeries: Procedure(s):  EMBOLIZATION, BLOOD VESSEL     General Precautions: Standard, aspiration, fall, seizure   Orthopedic Precautions:N/A   Braces: N/A     Recommendations:     Discharge Recommendations:   (TBD; pt requires 24/7 assistance)   Level of Assistance Recommended: 24 hours significant assistance  Discharge Equipment Recommendations:  (TBD)   Barriers to discharge: Decreased caregiver support (increased skilled assistance needed)    Assessment:     Marely Hamilton is a 58 y.o. female admitted with a medical diagnosis of non-convulsive status epilepticus. Today, patient presented with delayed process and confusion. She required total assistance of 2 persons for squat pivot transfers from bed to chair and chair to bed. She performed 1/2 stand with total assistance of 2 people. She declined standing in // bars. She was unable to propel wheelchair with bilateral upper extremities despite verbal education and visual demonstration.  Performance deficits affecting patient's function include the following:  weakness, impaired functional mobility, impaired cognition, decreased safety awareness, impaired cardiopulmonary response to activity, impaired endurance, pain, impaired coordination, impaired balance, impaired skin, impaired self care skills, decreased lower extremity function, decreased ROM.  Patient was mainly limited by impaired cognition.     Prior to hospitalization, patient was living with her mother. Patient's mother  while patient was hospitalized, and patient is currently unaware of her mother's passing. Patient may benefit from skilled physical therapy services to address the mentioned deficits in order to increase safety and independence with functional mobility.    Rehab Potential is poor     Activity Tolerance: Poor    Plan:      Patient to be seen 5 x/week to address the above listed problems via gait training, therapeutic activities, therapeutic exercises, neuromuscular re-education, wheelchair management/training     Plan of Care Expires: 07/31/23  Plan of Care Reviewed with: patient    Social History   Living Environment:  Patient was living with her mother who recently passed away (per chart pt is unaware).   Patient lived in a single story house with 1 step to enter with no handrails.   She had a walk-in shower and built-in shower chair    Prior Level of Function:   modified (I) for mobility with rolling walker. She was mod I with most ADLs (required assistance with bathing)  She was on disability  She was not driving.   DME used: wheelchair, walker, rolling, bedside commode (built-in shower seat)    Upon discharge, patient will have  unknown level of assistance  from: sister    Rony Patton, OT performed direct hand-off with PT.  Patient found sitting edge of bed with 3L oxygen via NC upon PT entry to room. Patient is alone during session.  Pt is agreeable to evaluation.     Chief Complaint: none stated  Patient/Family Comments/goals:  pt unable to communicate goals at this time   Pain/Comfort:  Pain Rating 1: 0/10  Pain Rating Post-Intervention 1: 0/10    Patients cultural, spiritual, Ho[employer]ism conflicts given the current situation: no    Objective:     Exams:  Cognition:      Alert and Oriented X 1 to self only. She correctly states year.  Command following: inattentive, easily distracted, does not follow 95% of simple commands.   Sensation: unable to adequately assess due to patient's impaired cognition     RLE ROM:   Hip Flexion: WFL  Knee Extension: WFL  Knee Flexion: WFL  Ankle Dorsiflexion: ~ 25% of functional range  Ankle Plantarflexion: WFL    LLE ROM:   Hip Flexion: WFL  Knee Extension: WFL  Knee Flexion: WFL  Ankle Dorsiflexion: WFL  Ankle Plantarflexion: WFL    RLE Strength: unable to adequately assess via MMT  due to pt's impaired cognition     LLE Strength: unable to adequately assess via MMT due to pt's impaired cognition     Outcome Measures  AM-PAC 6 CLICK MOBILITY Score:9        07/01/23 0824   Prior Functioning: Everyday Activities   Self Care Independent   Indoor Mobility (Ambulation) Independent   Stairs Unknown   Functional Cognition Unknown   Prior Device Use Walker   Roll Left and Right   Assistance Needed Physical assistance   Physical Assistance Level Total assistance   CARE Score - Roll Left and Right 1   Roll Left and Right Discharge Goal   Discharge Goal 4   Sit to Lying   Assistance Needed Physical assistance   Physical Assistance Level Total assistance   Comment 2 person assistance due to patient's decreased ability to process directions   CARE Score - Sit to Lying 1   Sit to Lying Discharge Goal   Discharge Goal 4   Sit to Stand   Assistance Needed Physical assistance   Physical Assistance Level Total assistance   Comment Pt comes to 1/2 stand from bed with total assistance of 2 people. Her right foot slides forward and she unable to translate her hips anteriorly.   CARE Score - Sit to Stand 1   Sit to Stand Discharge Goal   Discharge Goal 3   Chair/Bed-to-Chair Transfer   Assistance Needed Physical assistance   Physical Assistance Level Total assistance   Comment total assistance of 2 people for squat pivot to the right from bed and squat pivot to left to bed   CARE Score - Chair/Bed-to-Chair Transfer 1   Chair/Bed-to-Chair Transfer Discharge Goal   Discharge Goal 3   Car Transfer   Reason if not Attempted Environmental limitations   CARE Score - Car Transfer 10   Walk 10 Feet   Comment Pt unable to come to full stand with total assistance of 2 people due to impaired cognition   Reason if not Attempted Safety concerns   CARE Score - Walk 10 Feet 88   Walk 10 Feet Discharge Goal   Discharge Goal 2   Walk 50 Feet with Two Turns   Comment Pt unable to come to full stand with total assistance of 2 people  due to impaired cognition   Reason if not Attempted Safety concerns   CARE Score - Walk 50 Feet with Two Turns 88   Walk 150 Feet   Comment Pt unable to come to full stand with total assistance of 2 people due to impaired cognition   Reason if not Attempted Safety concerns   CARE Score - Walk 150 Feet 88   Walking 10 Feet on Uneven Surfaces   Comment Pt unable to come to full stand with total assistance of 2 people due to impaired cognition   Reason if not Attempted Safety concerns   CARE Score - Walking 10 Feet on Uneven Surfaces 88   1 Step (Curb)   Comment Pt unable to come to full stand with total assistance of 2 people due to impaired cognition   Reason if not Attempted Safety concerns   CARE Score - 1 Step (Curb) 88   4 Steps   Comment Pt unable to come to full stand with total assistance of 2 people due to impaired cognition   Reason if not Attempted Safety concerns   CARE Score - 4 Steps 88   12 Steps   Comment Pt unable to come to full stand with total assistance of 2 people due to impaired cognition   Reason if not Attempted Safety concerns   CARE Score - 12 Steps 88   Picking Up Object   Comment Pt unable to come to full stand with total assistance of 2 people due to impaired cognition   Reason if not Attempted Safety concerns   CARE Score - Picking Up Object 88   Uses a Wheelchair/Scooter?   Uses a Wheelchair/Scooter? Yes   Wheel 50 Feet with Two Turns   Assistance Needed Physical assistance   Physical Assistance Level Total assistance   Comment Pt unable to propel w/c with arms or feet depsite demonstration due to impaired congition   CARE Score - Wheel 50 Feet with Two Turns 1   Wheel 50 Feet with Two Turns Discharge Goal   Discharge Goal 4   Wheel 150 Feet   Assistance Needed Physical assistance   Physical Assistance Level Total assistance   Comment Pt unable to propel w/c with arms or feet depsite demonstration due to impaired congition   CARE Score - Wheel 150 Feet 1       Functional  Mobility:    Sitting Balance at Edge of Bed:  Assistance Level Required: Stand-by Assistance  Time: 4 minutes     Education:   Patient educated on PT POC and role of PT in skilled nursing facility  Patient educated on the importance of mobility to prevent functional decline during rehabilitation stay  Patient was instructed to utilize staff assistance for mobility/transfers  Patient is appropriate to transfer with total assistance of 2 people via squat pivot   White board updated to include patient's safest level of mobility with staff assistance  Patient educated on bed mobility training, Fall risk, plan of care, and transfer training by explanation.  Patient was confusion limiting to education and needs further education.       Patient left HOB elevated with call button in reach, RN notified, and tele sitter present .    GOALS:   Multidisciplinary Problems       Physical Therapy Goals          Problem: Physical Therapy    Goal Priority Disciplines Outcome Goal Variances Interventions   Physical Therapy Goal     PT, PT/OT Ongoing, Progressing     Description: Goals to be met by: 2023     Patient will increase functional independence with mobility by performin. Supine to sit with Stand-by Assistance  2. Sit to supine with Stand-by Assistance  3. Rolling to Left and Right with Stand-by Assistance.  4. Sit to stand transfer with Minimal Assistance  5. Bed to chair transfer with Minimal Assistance   6. Wheelchair propulsion x 50 feet with Stand-by Assistance using bilateral upper extremities  7. Sitting at edge of bed x 25 minutes with Supervision  8. Stand for 2 minutes with Stand-by Assistance using Rolling Walker                         History:     Past Medical History:   Diagnosis Date    Addiction to drug     Alcohol abuse     Alcohol abuse, in remission 6/15/2015    14.5 weeks ago; AA weekly    Anemia     Anxiety 6/15/2015    Behavioral problem     Bipolar disorder     Bipolar disorder in remission  6/15/2015    Cirrhosis, Laennec's 6/15/2015    Depression     Encounter for blood transfusion     Epistaxis 6/15/2015    Fatigue     Glaucoma     Hematuria     Hepatic encephalopathy 6/15/2015    Hepatic enlargement     History of psychiatric care     History of psychiatric hospitalization     History of seizure 6/15/2015    1    hx of intentional Tylenol overdose 6/15/2015    2005- situational and hx of bipolar    Hx of psychiatric care     Macrocytic anemia 9/18/2015    6 units PRBC since June 2015    [facility]     Osteoarthritis     Other ascites 6/15/2015    Psychiatric exam requested by authority     Psychiatric problem     Psychosis 9/26/2019    Renal disorder     Seizures     Self-harming behavior     Suicide attempt     Therapy     Thrombocytopenia 6/15/2015       Past Surgical History:   Procedure Laterality Date    COSMETIC SURGERY      EMBOLIZATION N/A 6/11/2023    Procedure: EMBOLIZATION, BLOOD VESSEL;  Surgeon: Debbie Surgeon;  Location: [facility];  Service: Anesthesiology;  Laterality: N/A;    ESOPHAGOGASTRODUODENOSCOPY         Time Tracking:     PT Received On: 08/24/23  PT Start Time: 0824     PT Stop Time: 0855  PT Total Time (min): 31 min     Billable Minutes: Evaluation 1 procedure      07/01/2023

## 2023-07-02 LAB
ALBUMIN SERPL BCP-MCNC: 2.8 G/DL (ref 3.5–5.2)
ALP SERPL-CCNC: 132 U/L (ref 55–135)
ALT SERPL W/O P-5'-P-CCNC: 22 U/L (ref 10–44)
ANION GAP SERPL CALC-SCNC: 8 MMOL/L (ref 8–16)
AST SERPL-CCNC: 43 U/L (ref 10–40)
BILIRUB SERPL-MCNC: 7.3 MG/DL (ref 0.1–1)
BUN SERPL-MCNC: 9 MG/DL (ref 6–20)
CALCIUM SERPL-MCNC: 8.8 MG/DL (ref 8.7–10.5)
CHLORIDE SERPL-SCNC: 111 MMOL/L (ref 95–110)
CO2 SERPL-SCNC: 23 MMOL/L (ref 23–29)
CREAT SERPL-MCNC: 0.5 MG/DL (ref 0.5–1.4)
ERYTHROCYTE [DISTWIDTH] IN BLOOD BY AUTOMATED COUNT: 30.5 % (ref 11.5–14.5)
EST. GFR  (NO RACE VARIABLE): >60 ML/MIN/1.73 M^2
GLUCOSE SERPL-MCNC: 97 MG/DL (ref 70–110)
HCT VFR BLD AUTO: 25.8 % (ref 37–48.5)
HGB BLD-MCNC: 8.5 G/DL (ref 12–16)
LITHIUM SERPL-SCNC: 0.1 MMOL/L (ref 0.6–1.2)
MAGNESIUM SERPL-MCNC: 1.6 MG/DL (ref 1.6–2.6)
MCH RBC QN AUTO: 32.4 PG (ref 27–31)
MCHC RBC AUTO-ENTMCNC: 32.9 G/DL (ref 32–36)
MCV RBC AUTO: 99 FL (ref 82–98)
PLATELET # BLD AUTO: 77 K/UL (ref 150–450)
PLATELET BLD QL SMEAR: ABNORMAL
PMV BLD AUTO: 12.6 FL (ref 9.2–12.9)
POTASSIUM SERPL-SCNC: 3.7 MMOL/L (ref 3.5–5.1)
PROT SERPL-MCNC: 6.2 G/DL (ref 6–8.4)
RBC # BLD AUTO: 2.62 M/UL (ref 4–5.4)
SODIUM SERPL-SCNC: 142 MMOL/L (ref 136–145)
WBC # BLD AUTO: 3.73 K/UL (ref 3.9–12.7)

## 2023-07-02 PROCEDURE — 94761 N-INVAS EAR/PLS OXIMETRY MLT: CPT

## 2023-07-02 PROCEDURE — 25000003 PHARM REV CODE 250: Performed by: HOSPITALIST

## 2023-07-02 PROCEDURE — 85027 COMPLETE CBC AUTOMATED: CPT | Performed by: FAMILY MEDICINE

## 2023-07-02 PROCEDURE — 11000004 HC SNF PRIVATE

## 2023-07-02 PROCEDURE — 83735 ASSAY OF MAGNESIUM: CPT | Performed by: HOSPITALIST

## 2023-07-02 PROCEDURE — 25000003 PHARM REV CODE 250: Performed by: FAMILY MEDICINE

## 2023-07-02 PROCEDURE — 80178 ASSAY OF LITHIUM: CPT | Performed by: FAMILY MEDICINE

## 2023-07-02 PROCEDURE — 80053 COMPREHEN METABOLIC PANEL: CPT | Performed by: HOSPITALIST

## 2023-07-02 RX ADMIN — FUROSEMIDE 40 MG: 40 TABLET ORAL at 09:07

## 2023-07-02 RX ADMIN — BACITRACIN: 500 OINTMENT TOPICAL at 08:07

## 2023-07-02 RX ADMIN — LEVETIRACETAM 1000 MG: 500 TABLET, FILM COATED ORAL at 09:07

## 2023-07-02 RX ADMIN — BACITRACIN: 500 OINTMENT TOPICAL at 02:07

## 2023-07-02 RX ADMIN — LACTULOSE 20 G: 20 SOLUTION ORAL at 09:07

## 2023-07-02 RX ADMIN — LACTULOSE 20 G: 20 SOLUTION ORAL at 02:07

## 2023-07-02 RX ADMIN — BACITRACIN: 500 OINTMENT TOPICAL at 09:07

## 2023-07-02 RX ADMIN — PROPRANOLOL HYDROCHLORIDE 20 MG: 10 TABLET ORAL at 09:07

## 2023-07-02 RX ADMIN — MICONAZOLE NITRATE 2 % TOPICAL POWDER: at 01:07

## 2023-07-02 RX ADMIN — RIFAXIMIN 550 MG: 550 TABLET ORAL at 09:07

## 2023-07-02 RX ADMIN — SPIRONOLACTONE 50 MG: 25 TABLET, FILM COATED ORAL at 09:07

## 2023-07-02 RX ADMIN — LITHIUM CARBONATE 150 MG: 150 CAPSULE, GELATIN COATED ORAL at 09:07

## 2023-07-02 RX ADMIN — FOLIC ACID 1 MG: 1 TABLET ORAL at 09:07

## 2023-07-02 RX ADMIN — OLANZAPINE 10 MG: 2.5 TABLET, FILM COATED ORAL at 09:07

## 2023-07-02 RX ADMIN — MICONAZOLE NITRATE 2 % TOPICAL POWDER: at 08:07

## 2023-07-02 RX ADMIN — MICONAZOLE NITRATE 2 % TOPICAL POWDER: at 09:07

## 2023-07-02 NOTE — PROGRESS NOTES
[facility]                                  Skilled Nursing Facility                   Progress Note     Admit Date: 6/30/2023  BRAYAN   Principal Problem:  Non-convulsive status epilepticus   HPI obtained from patient interview and chart review     Chief Complaint: Re-evaluation of medical treatment and therapy status: lab review      HPI: Marely Hamilton is a 58 y.o. female with past psychiatric history of bipolar disorder, alcohol use disorder & past pertinent medical history of seizure disorder (on AEDs), ETOH cirrhosis presents to the ED/admitted to hospital on 5/28 for Acute encephalopathy.      Frequent left hemispheric electrographic seizures and NCSE on EEG. Treated with Keppra, lactulose and rifaximin. Intubated for airway protection; extubated 6/16. Completed rocephin x 7 days for Proteus mirabilis UTI. No epileptiform discharges on EEG x 24. Acute DVTs Right brachial, femoral, and IJ. Tx argatroban-->heparin then developed large retroperitoneal hemorrhage requiring 9 units PRBCs, 11 platelet doses, 3 FFP, 2 cryoprecipitate. Now S/P IR embolization of left lumbar and internal iliac branch and S/P IVC filter placement.      Hypercalcemia treated by holding lithium. H/O severe episodes of crystal with rapid decompensation.  Previously very stable on Lithium for mood stabilization. Per psych consult 6/30: Continue lithium 150mg qhs, stop trazodone, and decrease q HS Zyprexa from 10 to 5 mg as early as 7/2. Goal lithium level 0.6-1.2mEq/L. Check lithium level Sunday 7/2 and monitor renal function. Follow-up with outpt [facility], Dr. Coates, as early as 7/3.     Patient will be treated at [facility] with PT and OT to improve functional status and ability to perform ADLs. Consults placed to RT, RD, SLP, PT/OT, Wound Care, Case Management, TelePsychiatry. Sister plans to come to [place] to assist with transition from SNF to home with sitter  with long-term goal of moving patient to [place] to sister.       Interval History  24 hour chart review completed. NAEON. NAD.  Patient awake, oriented to person and place.  Afebrile, vital signs stable.   All of today's labs reviewed; listed and addressed below.   Lithium level subtherapeutic. Will consult tele-psych in am.  Patient reports inadequate appetite.  Pain management: acceptably controlled per patient report.  Today's plan of care discussed with patient and [facility] Attending Physician.      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    Past Surgical History: Patient has a past surgical history that includes Esophagogastroduodenoscopy; Cosmetic surgery; and Embolization (N/A, 6/11/2023).    Social History: Patient reports that she has never smoked. She has never used smokeless tobacco. She reports that she does not currently use alcohol. She reports that she does not use drugs.    Family History: family history includes Alcohol abuse in her father, paternal grandfather, and sister; Bipolar disorder in her father; Hypertension in her mother; Suicide in her father.    Allergies:  Patient is allergic to sulfa (sulfonamide antibiotics) and codeine.    ROS  Constitutional: Negative for fever   Eyes: Negative for blurred vision, double vision   Respiratory: Negative for cough, occasional shortness of breath   Cardiovascular: Negative for chest pain, palpitations, and leg swelling.   Gastrointestinal: Negative for abdominal pain, constipation, diarrhea, nausea, vomiting. + decreased appetite  Genitourinary: Negative for dysuria, frequency   Musculoskeletal: + generalized weakness. Negative for back pain and myalgias.   Skin: Negative for itching and rash. + wounds, bruising  Neurological: Negative for dizziness, headaches. + memory loss  Psychiatric/Behavioral: The patient is nervous/anxious.      24 hour Vital Sign Range   Temp:  [97.7 °F (36.5 °C)-98.2 °F (36.8 °C)]   Pulse:  [64-70]   Resp:  [16-18]   BP: ()/(52-57)   SpO2:  [91 %-97 %]     Current BMI: Body mass index is 18.02 kg/m².    PEx  Constitutional: Patient appears catechetic, disheveled, chronically ill, debilitated.  No acute distress noted  HENT:   Head: Normocephalic.   Eyes: Pupils are equal, round  Neck: Normal range of motion.   Cardiovascular: Normal rate, regular rhythm and normal heart sounds.    Pulmonary/Chest: Effort normal and breath sounds are clear. 3L NC.  Abdominal: Soft. Bowel sounds are active.   Musculoskeletal: generalized weakness  Neurological: Alert and oriented to person and place. Confused. Inattention. RUE tremor.  Psychiatric: Flat affect.   Skin: Skin is warm and dry. Full skin assessment completed bu NP on initial exam. Extensive generalized  bruising in various stages of healing. Multiple active wounds as follows:           Altered Skin Integrity 06/03/23 1300  Groin Moisture associated dermatitis (Active) POA   Location: Groin   Is this injury device related?: No   Primary Wound Type: Moisture ass   Wound Image     Description of Altered Skin Integrity Intact skin with non-blanchable redness of  localized area   Dressing Appearance Open to air   Drainage Amount None   Drainage Characteristics/Odor No odor   Appearance Red;Moist   Tissue loss description Partial thickness   Wound Edges Irregular            Altered Skin Integrity 06/03/23 1900 Right Throat Blister(s) Purple or maroon localized area of discolored intact skin or non-intact skin or a blood-filled blister. (Active) POA   Side: Right   Location: Throat   Is this injury device related?: No   Primary Wound Type: Blister(s)   Description of Altered Skin Integrity: Purple or maroon localized area of discolored intact skin or non-intact skin or a blood-filled blister.   Description of Altered Skin Integrity Intact skin with non-blanchable redness of localized area   Dressing Appearance Open to air   Drainage Amount None   Drainage Characteristics/Odor No odor   Appearance Dry;Maroon            Altered Skin Integrity 06/05/23 1154 Left posterior Greater trochanter Moisture associated dermatitis (Active) POA   Side: Left   Orientation: posterior   Location: Greater trochanter   Primary Wound Type: Moisture ass   Description of Altered Skin Integrity Intact skin with non-blanchable redness of localized area   Dressing Appearance Open to air   Drainage Amount None   Drainage Characteristics/Odor No odor   Appearance Pink;Red   Tissue loss description Partial thickness   Periwound Area Pink;Moist            Altered Skin Integrity 06/05/23 1154 Right posterior Greater trochanter Moisture associated dermatitis (Active) POA   Side: Right   Orientation: posterior   Location: Greater trochanter   Primary Wound Type: Moisture ass   Wound Image     Description of Altered Skin Integrity Intact skin with non-blanchable redness of localized area   Dressing Appearance Open to air   Drainage Amount None   Drainage Characteristics/Odor No odor   Appearance Red   Tissue loss description Partial thickness   Periwound Area Pink;Moist            Altered Skin Integrity  06/18/23 2100 medial Nose Blister(s) Intact skin with non-blanchable redness of localized area (Active) POA   Orientation: medial   Location: Nose   Primary Wound Type: Blister(s)   Description of Altered Skin Integrity: Intact skin with non-blanchable redness of localized area   Wound Image     Description of Altered Skin Integrity Full thickness tissue loss. Base is covered by slough and/or eschar in the wound bed   Dressing Appearance Open to air;Dried drainage   Drainage Amount None   Drainage Characteristics/Odor No odor   Appearance Dry   Tissue loss description Partial thickness   Periwound Area Redness   Wound Edges Irregular            Altered Skin Integrity 06/25/23 1001 midline Frontal region Other (comment) Full thickness tissue loss. Subcutaneous fat may be visible but bone, tendon or muscle are not exposed (Active) POA   Orientation: midline   Location: Frontal region   Primary Wound Type: Other   Description of Altered Skin Integrity: Full thickness tissue loss. Subcutaneous fat may be visible but bone, tendon or muscle are not exposed   Description of Altered Skin Integrity Partial thickness tissue loss. Shallow open ulcer with a red or pink wound bed, without slough. Intact or Open/Ruptured Serum-filled blister.   Dressing Appearance Open to air   Drainage Amount None   Drainage Characteristics/Odor No odor   Appearance Dry   Tissue loss description Partial thickness            Altered Skin Integrity 06/27/23 0506 upper Sternal Skin Tear (Active) POA   Orientation: upper   Location: Sternal   Primary Wound Type: Skin tear   Wound Image     Description of Altered Skin Integrity Partial thickness tissue loss. Shallow open ulcer with a red or pink wound bed, without slough. Intact or Open/Ruptured Serum-filled blister.   Dressing Appearance Dry;Intact;Clean   Drainage Amount None   Drainage Characteristics/Odor No odor   Appearance Red   Tissue loss description Partial thickness   Red (%), Wound Tissue  "Color 100 %   Periwound Area Redness;Moist   Wound Edges Irregular   Wound Length (cm) 2 cm   Wound Width (cm) 2 cm   Wound Depth (cm) 0.1 cm   Wound Volume (cm^3) 0.4 cm^3   Wound Surface Area (cm^2) 4 cm^2            Altered Skin Integrity 06/29/23 1523 Sacral spine Moisture associated dermatitis (Active) POA   Location: Sacral spine   Wound Number:    Is this injury device related?:    Primary Wound Type: Moisture ass   Wound Image     Dressing Appearance Open to air   Drainage Amount None   Drainage Characteristics/Odor No odor   Appearance Red   Tissue loss description Partial thickness   Periwound Area Moist;Altha   Wound Edges Irregular   Wounds followed closely by Wound Care.       Recent Labs   Lab 07/02/23  0626      K 3.7   *   CO2 23   BUN 9   CREATININE 0.5   MG 1.6       Recent Labs   Lab 07/02/23  0400   WBC 3.73*   RBC 2.62*   HGB 8.5*   HCT 25.8*   PLT 77*   MCV 99*   MCH 32.4*   MCHC 32.9       No results for input(s): POCTGLUCOSE in the last 168 hours.     Assessment and Plan:    Non-convulsive status epilepticus  Maintain seizure precautions  PO Keppra 1G BID  Rifaximin po 550 mg BID  Lactulose po 20 g TID - titrate for > 3 BMs q day     Acute metabolic encephalopathy  Multifactorial; r/t Hepatic encephalopathy & Non-convulsive status epilepticus  Proteus mirabilis UTI, resolved in hospital s/p 7D course rocephin   Maintain Delirium precautions  See "Hepatic encephalopathy" and "Non-convulsive status epilepticus"     Hepatic encephalopathy  Chronic, unstable.  Persistent confusion - worsened from baseline per sister  Elevated ammonia on hospital admit, WNL by discharge.   Continue Lactulose and rifaximin  Titrate therapy prn to goal for BM > 3 a day     Acute respiratory failure with hypoxia  6/2 Intubated for airway protection r/t status epilepticus; extubated 6/16 to RA  Patient requiring 2 to 3 L NC to maintain SpO2 > 90% since SNF admit  RT consulted for oxygen monitoring, " titration, and weaning of O2 per sat goal    Acute deep vein thrombosis (DVT) of brachial vein of right upper extremity  Deep vein thrombosis (DVT) of femoral vein of right lower extremity  Right IJ DVT  Recent H/O Large Right retroperitoneal bleed  Argatroban transitioned to heparin then stopped d/t retroperitoneal hemorrhage and thrombocytopenia.   Now S/P IVC filter placed 6/14   Maintain SCDs      Macrocytic anemia   Anemia of chronic disease           Recent H/O Acute blood loss anemia  Recent H/O Large Right retroperitoneal bleed  Recent H/O Hypovolemic shock  Received 9 units PRBCs, 11 platelet doses, 3 FFP, 2 cryoprecipitate in short term acute  S/P IR embolization of left lumbar and internal iliac branch 6/13  Previously on epoetin  Maintain bleeding precautions  Monitor Hgb with CBC Diff q T/Fri   Transfuse PRBC for Hgb < 7.0 and / or active bleeding     Thrombocytopenia  Chronic, unstable  Due to liver failure  S/p 11 platelet doses, 3 FFP, 2 cryoprecipitate in short term acute  Bleeding precautions  Monitor with routinely scheduled CBC Diff  Transfuse for platelets <15 to <20K if pt develops fever, infection, or inflammation  Prophylactically transfuse for platelets < 10K to prevent spontaneous bleeding      Alcoholic Liver Failure   Ascites d/t liver failure  R/T Alcohol abuse, in remission  Furosemide 40 mg BID   Spironolactone 50 mg qhs  Thiamine 100 mg po daily  Refer patient to outpatient hepatology at discharge or during SNF admit if indicated     Bipolar 1 disorder  As per Psych consult day of hospital discharge 6/30:  - Continue lithium 150mg qhs, stop trazodone  - Decrease q HS Zyprexa from 10 to 5 mg as early as 7/2.   - Goal lithium level 0.6-1.2mEq/L.   - Check lithium level Sunday 7/2 and monitor renal function.   - Follow-up with outpt [facility], Dr. Coates, as early as 7/3.     Severe protein-calorie malnutrition       Body mass index is 18.02 kg/m².       Nutrition consulted.        TID  nutritional supplements       MVI, Thiamine, Zinc po daily        Diet:  low sodium dysphagia soft diet. Boost plus TID with meals  DVT PPx:  SCD/SINCERE, s/p IVC. AC held due to thrombocytopenia      Anticipate disposition:  Home with home health      Follow-up needed during SNF stay- Telepsych, possibly hepatology     Follow-up needed after discharge from SNF: PCP, Hepatology, Psychiatry    No future appointments.      I certify that SNF services are required to be given on an inpatient basis because Marely Hamilton needs for skilled nursing care and/or skilled rehabilitation are required on a daily basis and such services can only practically be provided in a skilled nursing facility setting and are for an ongoing condition for which she received inpatient care in the hospital.     Extended Visit  Total time spent: > 31 minutes  Description of Time: counseling patient on clinical condition, therapies provided, plan of care, emotional support, coordinating patient care with other care team members, reviewing and interpreting labs and imaging, collaboration with physician, initiating new orders, chart review, and documentation. See interval hx.         Kathy Reno NP  Department of Hospital Medicine   [facility]     DOS: 7/2/2023

## 2023-07-02 NOTE — PROGRESS NOTES
N/P Inna Light informed via secure shot of pt's lithium level = 0.1 and cmp,cbc, and magnesium results now in computer.

## 2023-07-03 PROCEDURE — 97530 THERAPEUTIC ACTIVITIES: CPT | Mod: CQ

## 2023-07-03 PROCEDURE — 25000003 PHARM REV CODE 250: Performed by: FAMILY MEDICINE

## 2023-07-03 PROCEDURE — 11000004 HC SNF PRIVATE

## 2023-07-03 PROCEDURE — 94761 N-INVAS EAR/PLS OXIMETRY MLT: CPT

## 2023-07-03 PROCEDURE — 97535 SELF CARE MNGMENT TRAINING: CPT | Mod: CO

## 2023-07-03 PROCEDURE — 92610 EVALUATE SWALLOWING FUNCTION: CPT

## 2023-07-03 PROCEDURE — 25000003 PHARM REV CODE 250: Performed by: HOSPITALIST

## 2023-07-03 PROCEDURE — 97110 THERAPEUTIC EXERCISES: CPT | Mod: CQ

## 2023-07-03 PROCEDURE — 27000221 HC OXYGEN, UP TO 24 HOURS

## 2023-07-03 PROCEDURE — 97535 SELF CARE MNGMENT TRAINING: CPT

## 2023-07-03 PROCEDURE — 92523 SPEECH SOUND LANG COMPREHEN: CPT

## 2023-07-03 PROCEDURE — 99900035 HC TECH TIME PER 15 MIN (STAT)

## 2023-07-03 RX ORDER — OLANZAPINE 2.5 MG/1
5 TABLET ORAL NIGHTLY
Status: DISCONTINUED | OUTPATIENT
Start: 2023-07-03 | End: 2023-07-06 | Stop reason: HOSPADM

## 2023-07-03 RX ORDER — ZINC SULFATE 50(220)MG
220 CAPSULE ORAL DAILY
Status: DISCONTINUED | OUTPATIENT
Start: 2023-07-03 | End: 2023-07-06 | Stop reason: HOSPADM

## 2023-07-03 RX ORDER — THIAMINE HCL 100 MG
100 TABLET ORAL DAILY
Status: DISCONTINUED | OUTPATIENT
Start: 2023-07-03 | End: 2023-07-06 | Stop reason: HOSPADM

## 2023-07-03 RX ADMIN — LACTULOSE 20 G: 20 SOLUTION ORAL at 09:07

## 2023-07-03 RX ADMIN — THERA TABS 1 TABLET: TAB at 02:07

## 2023-07-03 RX ADMIN — FOLIC ACID 1 MG: 1 TABLET ORAL at 09:07

## 2023-07-03 RX ADMIN — RIFAXIMIN 550 MG: 550 TABLET ORAL at 09:07

## 2023-07-03 RX ADMIN — MICONAZOLE NITRATE 2 % TOPICAL POWDER: at 08:07

## 2023-07-03 RX ADMIN — PROPRANOLOL HYDROCHLORIDE 20 MG: 10 TABLET ORAL at 09:07

## 2023-07-03 RX ADMIN — MICONAZOLE NITRATE: 20 OINTMENT TOPICAL at 08:07

## 2023-07-03 RX ADMIN — BACITRACIN: 500 OINTMENT TOPICAL at 02:07

## 2023-07-03 RX ADMIN — FUROSEMIDE 40 MG: 40 TABLET ORAL at 05:07

## 2023-07-03 RX ADMIN — LEVETIRACETAM 1000 MG: 500 TABLET, FILM COATED ORAL at 09:07

## 2023-07-03 RX ADMIN — ZINC SULFATE 220 MG (50 MG) CAPSULE 220 MG: CAPSULE at 02:07

## 2023-07-03 RX ADMIN — LACTULOSE 20 G: 20 SOLUTION ORAL at 04:07

## 2023-07-03 RX ADMIN — SPIRONOLACTONE 50 MG: 25 TABLET, FILM COATED ORAL at 09:07

## 2023-07-03 RX ADMIN — Medication 100 MG: at 02:07

## 2023-07-03 RX ADMIN — BACITRACIN: 500 OINTMENT TOPICAL at 09:07

## 2023-07-03 NOTE — CLINICAL REVIEW
Clinical Pharmacy Chart Review Note      Admit Date: 6/30/2023   LOS: 3 days       Marely Hamilton is a 58 y.o. female admitted to SNF for PT/OT after hospitalization for non-convulsive status epilepticus.    Active Hospital Problems    Diagnosis  POA    *Non-convulsive status epilepticus [G40.901]  Yes    Acute blood loss anemia [D62]  Yes    Retroperitoneal bleed [R58]  Yes    Acute deep vein thrombosis (DVT) of brachial vein of right upper extremity [I82.621]  Yes    Deep vein thrombosis (DVT) of femoral vein of right lower extremity [I82.411]  Yes    Acute liver failure with hepatic coma [K72.01]  Yes    Acute metabolic encephalopathy [G93.41]  Yes    Bipolar 1 disorder [F31.9]  Yes    Alcoholic cirrhosis [K70.30]  Yes    Hepatic encephalopathy [K76.82]  Yes    Severe protein-calorie malnutrition [E43]  Yes    Macrocytic anemia [D53.9]  Yes     6 units PRBC since June 2015      Alcohol abuse, in remission [F10.11]  Yes     Has addiction psych clearance from Dr Crain for liver tx      Thrombocytopenia [D69.6]  Yes      Resolved Hospital Problems   No resolved problems to display.     Review of patient's allergies indicates:   Allergen Reactions    Sulfa (sulfonamide antibiotics) Rash    Codeine Nausea And Vomiting     Patient Active Problem List    Diagnosis Date Noted    Acute blood loss anemia 06/30/2023    Hypotension 06/30/2023    Abrasion of nose 06/22/2023    Atelectasis 06/20/2023    Internal jugular (IJ) vein thromboembolism, acute, right 06/20/2023    Retroperitoneal bleed 06/11/2023    Coagulopathy 06/11/2023    Deep vein thrombosis (DVT) of femoral vein of right lower extremity 06/09/2023    Acute deep vein thrombosis (DVT) of brachial vein of right upper extremity 06/09/2023    Hypotension due to hypovolemia 06/04/2023    Acute liver failure with hepatic coma 06/03/2023    Non-convulsive status epilepticus 06/01/2023    Hypophosphatemia 05/26/2023    Refeeding syndrome 05/26/2023    Low body  temperature 05/24/2023    Hyperammonemia 05/19/2023    Hypercalcemia 05/19/2023    Acute metabolic encephalopathy 05/19/2023    Metabolic acidosis 04/18/2023    SVT (supraventricular tachycardia) 02/10/2023    Bipolar disorder, manic 01/21/2021    Bipolar 1 disorder 06/09/2020    Elevated LFTs 06/03/2020    Bipolar 1 disorder, depressed, severe     Alcoholic cirrhosis 04/27/2018    Chronic liver failure 10/11/2015    Severe protein-calorie malnutrition 10/11/2015    Hepatic encephalopathy 10/11/2015    Macrocytic anemia 09/18/2015    Cirrhosis, Laennec's 06/15/2015    Thrombocytopenia 06/15/2015    History of seizure 06/15/2015    Glaucoma 06/15/2015    hx of intentional Tylenol overdose 06/15/2015    Alcohol abuse, in remission 06/15/2015    AYESHA (acute kidney injury) 06/15/2015       Scheduled Meds:    bacitracin   Topical (Top) TID    folic acid  1 mg Oral Daily    furosemide  40 mg Oral BID    lactulose  20 g Oral TID    levETIRAcetam  1,000 mg Oral BID    lithium carbonate  150 mg Oral QHS    miconazole NITRATE 2 %   Topical (Top) BID    miconazole nitrate 2%   Topical (Top) BID    OLANZapine  10 mg Oral QHS    propranoloL  20 mg Oral Daily    rifAXImin  550 mg Oral BID    senna-docusate 8.6-50 mg  1 tablet Oral BID    spironolactone  50 mg Oral Daily     Continuous Infusions:   PRN Meds: acetaminophen, acetaminophen, calcium carbonate, hydrOXYzine HCL, melatonin    OBJECTIVE:     Vital Signs (Last 24H)  Temp:  [98.9 °F (37.2 °C)-99 °F (37.2 °C)]   Pulse:  [60-73]   Resp:  [16]   BP: (100-110)/(50-55)   SpO2:  [93 %-95 %]     Laboratory:  CBC:   Recent Labs   Lab 06/28/23  0230 06/29/23  0453 07/02/23  0400   WBC 4.01 5.47 3.73*   RBC 2.58* 2.84* 2.62*   HGB 7.9* 8.9* 8.5*   HCT 25.4* 28.2* 25.8*   PLT 46* 49* 77*   MCV 98 99* 99*   MCH 30.6 31.3* 32.4*   MCHC 31.1* 31.6* 32.9     BMP:   Recent Labs   Lab 06/29/23  0453 06/30/23  0439 07/02/23  0626   * 137* 97    137 142   K 3.9 3.6 3.7     "107 111*   CO2 24 21* 23   BUN 7 8 9   CREATININE 0.4* 0.4* 0.5   CALCIUM 9.1 9.0 8.8   MG 1.6 1.5* 1.6     CMP:   Recent Labs   Lab 06/29/23 0453 06/30/23 0439 07/02/23 0626   * 137* 97   CALCIUM 9.1 9.0 8.8   ALBUMIN 2.9* 2.7* 2.8*   PROT 6.2 5.7* 6.2    137 142   K 3.9 3.6 3.7   CO2 24 21* 23    107 111*   BUN 7 8 9   CREATININE 0.4* 0.4* 0.5   ALKPHOS 155* 141* 132   ALT 22 21 22   AST 42* 39 43*   BILITOT 8.5* 6.6* 7.3*     LFTs:   Recent Labs   Lab 06/29/23 0453 06/30/23 0439 07/02/23 0626   ALT 22 21 22   AST 42* 39 43*   ALKPHOS 155* 141* 132   BILITOT 8.5* 6.6* 7.3*   PROT 6.2 5.7* 6.2   ALBUMIN 2.9* 2.7* 2.8*     Lab Results   Component Value Date    LITHIUM 0.1 (L) 07/02/2023     07/02/2023    BUN 9 07/02/2023    CREATININE 0.5 07/02/2023    TSH 0.582 05/24/2023    WBC 3.73 (L) 07/02/2023       ASSESSMENT/PLAN:     I have reviewed the medications in compliance with CMS Regulation F756 of the [place]. Based on information gathered, the following items may need to be addressed:    **According to PMH and home medication list, patient takes the following medications at home. These medications are not currently ordered at [facility]:  Zonisamide 100 mg daily    **Patient is taking lithium and olanzapine initiated by psychiatry at [facility] for mood disorder with episodes of crystal. A gradual dose reduction is not recommended at this time. Patient to follow-up with prescribing MD as outpatient. Patient will be managed by outpatient psych.  Monitor: mental status for mood changes, manic behavior, depression, suicide ideation, anxiety, dizziness, drowsiness, somnolence, gastritis, blurred vision, bradyarrhythmia, leukocytosis  "Extrapyramidal symptoms (EPS)" such as:  - Akathisia: a distressing feeling of internal restlessness that may appear as constant motion, the inability to sit still, fidgeting, pacing, or rocking.   - Medication-induced Parkinsonism: including tremors, shuffling gait, slowness " "of movement, expressionless face, drooling, postural unsteadiness and rigidity of muscles in the limbs, neck and trunk.   - Dystonia: acute, painful, spastic contraction of muscle groups (commonly the neck, eyes and trunk) that often occurs soon after initiating treatment and is more common in younger individuals   "Tardive dyskinesia"   - abnormal, recurrent, involuntary movements that may be irreversible and typically present as lateral movements of the tongue or jaw, tongue thrusting, chewing, frequent blinking, brow arching, grimacing, and lip smacking, although the trunk or other parts of the body may also be affected   "Neuroleptic Malignant Syndrome (NMS)"  - typically presents with a sudden onset of diffuse muscle rigidity, high fever, labile blood pressure, tremor, and notable cognitive dysfunction. It is potentially fatal if not treated immediately, including stopping the offending medications     **Hydroxyzine (homed med) is ordered as needed for anxiety and itching. This medication is a Beers drug and potentially inappropriate in older adult patients. Per CMS guidelines: The timeframe is limited for PRN psychotropic medications, which are not antipsychotic medications, to 14 days, unless a longer timeframe is deemed appropriate by the attending physician or the prescribing practitioner, however, this is a continuation of a home medication order. Monitor use and associated side effects. Consider discontinuation if patient is not using or adverse effects observed.  Beers Criteria: Avoid use of first generation antihistamines in older adults due to strong anticholinergic effects and reduced clearance with advanced age, which increase the risk of anticholinergic effects and toxicity. Tolerance develops when used as a hypnotic. In particular avoid in patients with delirium or at high risk of delirium as it may induce or worsen delirium, in patients with dementia or cognitive impairment because of adverse CNS " effects, and in men with lower urinary tract symptoms or benign prostatic hyperplasia as decreased urinary flow and urinary retention may occur     Medications reviewed by PharmD, please re-consult if needed.      Sujatha Holt, Pharm. D.  Clinical Pharmacist  [facility]

## 2023-07-03 NOTE — PLAN OF CARE
Contnue [facility] soft level 5 diet, fluid per MD, 2 gram sodium, boost plus TID,RD following  Goals: PO to meet 50% of needs with ONS by next RD follow up  Nutrition Goal Status: new  Communication of RD Recs: reviewed with physician     Assessment and Plan     Endocrine  Severe protein-calorie malnutrition  Malnutrition Type:  Context: social/environmental circumstances  Level: severe     Related to (etiology):   Decline in ADL's, physiological cause     Malnutrition Characteristic Summary:  Weight Loss (Malnutrition): greater than 10% in 6 months  Energy Intake (Malnutrition): less than 75% for greater than or equal to 3 months  Subcutaneous Fat (Malnutrition): severe depletion  Muscle Mass (Malnutrition): severe depletion        Interventions/Recommendations (treatment strategy):  Contnue [facility] soft level 5 diet, fluid per MD, 2 gram sodium, boost plus TID,thin liquids, RD following     Nutrition Diagnosis Status:   Continues

## 2023-07-03 NOTE — PT/OT/SLP EVAL
Speech Language Pathology  Evaluation    Marely Hamilton   MRN: [id number]   Admitting Diagnosis: Non-convulsive status epilepticus    Diet recommendations: Solid Diet Level: Dental Soft  Liquid Diet Level: Thin 1 bite/sip at a time, Alternating bites/sips, Assistance with meals, Avoid talking while eating, Eliminate distractions, Feed only when awake/alert, Frequent oral care, HOB to 90 degrees, Meds crushed in puree, Remain upright 30 minutes post meal, Small bites/sips, and Strict aspiration precautions    SLP Treatment Date: 07/03/23  Speech Start Time: 1352     Speech Stop Time: 1424     Speech Total (min): 32 min       TREATMENT BILLABLE MINUTES:  Eval 16 , Eval Swallow and Oral Function 8, and Self Care/Home Management Training      Diagnosis: Non-convulsive status epilepticus      Past Medical History:   Diagnosis Date    Addiction to drug     Alcohol abuse     Alcohol abuse, in remission 6/15/2015    14.5 weeks ago; AA weekly    Anemia     Anxiety 6/15/2015    Behavioral problem     Bipolar disorder     Bipolar disorder in remission 6/15/2015    Cirrhosis, Laennec's 6/15/2015    Depression     Encounter for blood transfusion     Epistaxis 6/15/2015    Fatigue     Glaucoma     Hematuria     Hepatic encephalopathy 6/15/2015    Hepatic enlargement     History of psychiatric care     History of psychiatric hospitalization     History of seizure 6/15/2015    1    hx of intentional Tylenol overdose 6/15/2015    2005- situational and hx of bipolar    Hx of psychiatric care     Macrocytic anemia 9/18/2015    6 units PRBC since June 2015    Jeana     Osteoarthritis     Other ascites 6/15/2015    Psychiatric exam requested by authority     Psychiatric problem     Psychosis 9/26/2019    Renal disorder     Seizures     Self-harming behavior     Suicide attempt     Therapy     Thrombocytopenia 6/15/2015     Past Surgical History:   Procedure Laterality Date    COSMETIC SURGERY      EMBOLIZATION N/A 6/11/2023    Procedure:  EMBOLIZATION, BLOOD VESSEL;  Surgeon: Debbie Surgeon;  Location: [facility];  Service: Anesthesiology;  Laterality: N/A;    ESOPHAGOGASTRODUODENOSCOPY         Has the patient been evaluated by SLP for swallowing? : Yes  Keep patient NPO?: No   General Precautions: Standard, aspiration, fall        History of Present Illness: Marely Hamilton is a 58 year old female with a medical history significant for EtOH induced hepatic cirrhosis, seizure disorder on outpatient LEV and ZNS and bipolar disorder who [place] [facility] [place] a transfer from OSH for evaluation of persistent encephalopathy concerning for NCSE vs hepatic encephalopathy. History is obtained from chart as patient is unresponsive and unable to assist. Initially presented to [facility] on 5/18 for encephalopathy and thought to be multifactorial including UTI (Proteus mirabilis s/p CTX course), hypercalcemia 2/2 lithium toxicity (Lithium changed to Abilify per Psych), as well as hepatic encephalopathy secondary to medication non compliance. She was treated with lactulose and rifaximin with no improvement in her mental status. EEG showed generalized slowing as well as triphasic discharges in the left hemisphere. MRI Brain WO was unremarkable for acute neurologic change. Decision was made [place] [facility] [place] and ongoing evaluation and management. On arrival, mental status exam has waxed and waned with patient being intermittently verbal and following commands. NH4 48.     NCC is consulted on hospital day 4 for admission after EEG showed frequent left hemispheric electrographic seizures lasting on average 10-30 seconds with prolonged seizures over 1.5 hours also noted. Per chart review, patient home dose of LEV increased from 1000mg to 1500mg BID, ZNS increased to 200mg. Vimpat 150mg BID added per General Neurology (had not been given prior to consult). On initial evaluation, patient is not responsive to voice or pain.  "Upward gaze noted. Decision made to transfer patient to [facility] for higher level of care and airway watch.          Prior diet: current diet orders for mechanical soft/thin liquids, most recent SLP recs were for dental soft/thin liquids    Intubation history: 6/2-6/11, 6/11-6/16    Subjective:  "I'm a doctor" "Cosmetic surgery." "I'm an ." After SLP educated pt on aspiration and its risks, pt stated she was a doctor. Pt later stated she was an . Pt exhibiting significant confusion and unable to provide reliable history.        Objective:        Oral Musculature Evaluation  Oral Musculature: general weakness  Dentition: present and adequate  Mucosal Quality: dry  Mandibular Strength and Mobility: WFL  Oral Labial Strength and Mobility: WFL  Lingual Strength and Mobility:  (decreased ROM 2/2 dry mucosa)  Volitional Cough: fair  Volitional Swallow: did nto elicit dry  Voice Prior to PO Intake: midl hoarseness, decreased volume and intensity     Cognitive Status:  Behavioral Observations: alert and confused-  Memory and Orientation: Pt was oriented to place/[facility] given moderate cues to utilize external aid properly. She was unable to orient to month and year despite cues to utilize external aid. She was not oriented to situation. Pt recalled 1 out of 3 unrelated words after 2 minute delay given cues.    Attention: poor  Problem Solving: not yet assessed  Executive Function: insight/awareness and self-monitoring impaired    Auditory Comprehension:  Pt answered simple yes/no questions with 75% accuracy and complex q's with 100% accuracy given repetition x2 to increase comprehension. Pt followed a 1-step command IND, but required a model to follow a 2-step command.     Verbal Expression:  Pt exhibiting confusion and confabulation during spontaneous speech.  Pt often tangential, off-task, and irrelevant in conversational speech .  Frequent redirection required to return to topic at hand. Single word responsive " naming q's were answered with 83% accuracy IND/100% given cues.  When asked to recite the days of the week, pt sang the ABC's then stated her mother's phone number. Pt named 5/5 objects in the room correctly.     Motor Speech:  wfl    Voice:  mild hoarseness; decreased volume and intensity    Reading:  Pt demonstrated ability to read a sign  in room, but had errors when reading from white board. Will further assess    Writing:  tba    Visual-Spatial: tba    Bedside Swallow Eval:  Consistencies Assessed:  Thin liquids cup sip x 1, straw sips x 3 (large cyclical)  Puree 1/2 tsp x 1  Mixed consistencies pill buried whole in puree x 2, pills crushed and buried in puree x 1  Solids 1/4 cracker x 1  Oral Phase: Orally expelled pill buried whole in puree x 1, slow mastication but functional for solid  Pharyngeal Phase: no overt clinical signs/symptoms of aspiration  no overt clinical signs/symptoms of pharyngeal dysphagia    Additional Treatment:  Education was provided to pt regarding role of SLP, purpose of swallowing and speech/language/cognitive evaluation, aspiration and its risks, complications a/w aspiration, diet recommendations, plans for cognitive tx,and SLP treatment plan and POC.  Pt's understanding and carryover likely poor given level of confusion displayed on this date.       Assessment:  Marely Hamilton is a 58 y.o. female with a medical diagnosis of Non-convulsive status epilepticus and presents with dysphagia and cognitive-linguistic deficits.      Discharge recommendations: Discharge Facility/Level of Care Needs:  (tbd)     Goals:   Multidisciplinary Problems       SLP Goals          Problem: SLP    Goal Priority Disciplines Outcome   SLP Goal     SLP                         Plan:   Patient to be seen Therapy Frequency: 3 x/week   Planned Interventions: Cognitive-Linguistic Therapy, Dysphagia Therapy  Plan of Care expires: 08/02/23  Plan of Care reviewed with: patient  SLP Follow-up?: Yes  SLP - Next  Visit Date: 07/05/23 07/03/2023

## 2023-07-03 NOTE — PROGRESS NOTES
[facility]                                  Skilled Nursing Facility                   Progress Note     Admit Date: 6/30/2023  BRAYAN   Principal Problem:  Non-convulsive status epilepticus   HPI obtained from patient interview and chart review     Chief Complaint: Establish Care/ Initial Visit     HPI: Marely Hamilton is a 58 y.o. female with past psychiatric history of bipolar disorder, alcohol use disorder & past pertinent medical history of seizure disorder (on AEDs), ETOH cirrhosis presents to the ED/admitted to hospital on 5/28 for Acute encephalopathy.     Frequent left hemispheric electrographic seizures and NCSE on EEG. Treated with Keppra, lactulose and rifaximin. Intubated for airway protection; extubated 6/16. Completed rocephin x 7 days for Proteus mirabilis UTI. No epileptiform discharges on EEG x 24. Acute DVTs Right brachial, femoral, and IJ. Tx argatroban-->heparin then developed large retroperitoneal hemorrhage requiring 9 units PRBCs, 11 platelet doses, 3 FFP, 2 cryoprecipitate. Now S/P IR embolization of left lumbar and internal iliac branch and S/P IVC filter placement.     Hypercalcemia treated by holding lithium. H/O severe episodes of crystal with rapid decompensation.  Previously very stable on Lithium for mood stabilization. Per psych consult 6/30: Continue lithium 150mg qhs, stop trazodone, and decrease q HS Zyprexa from 10 to 5 mg as early as 7/2. Goal lithium level 0.6-1.2mEq/L. Check lithium level Sunday 7/2 and monitor renal function. Follow-up with outpt [facility], Dr. Coates, as early as 7/3.    Patient will be treated at [facility] with PT and OT to improve functional status and ability to perform ADLs. Consults placed to RT, RD, SLP, PT/OT, Wound Care, Case Management, TelePsychiatry. Sister plans to come to [place] to assist with transition from SNF to home with sitter with long-term goal of moving [place].     ROS  Constitutional: Negative for fever   Eyes: Negative for blurred vision, double vision   Respiratory: Negative for cough or SOB  Cardiovascular: Negative for chest pain, palpitations, and leg swelling.   Gastrointestinal: Negative for abdominal pain, constipation, diarrhea, nausea, vomiting. + decreased appetite  Genitourinary: Negative for dysuria, frequency   Musculoskeletal: + generalized weakness. Negative for back pain and myalgias.   Skin: Negative for itching and rash. + wounds, bruising  Neurological: Negative for dizziness, headaches. + memory loss   Psychiatric/Behavioral: Denies depression or anxiety    24 hour Vital Sign Range   Temp:  [98.9 °F (37.2 °C)-99 °F (37.2 °C)]   Pulse:  [60-73]   Resp:  [16]   BP: (100-110)/(50-55)   SpO2:  [93 %-95 %]     Current BMI: Body mass index is 18.02 kg/m².    PEx  Constitutional: Patient appears catechetic, disheveled, chronically ill, debilitated.  No acute distress noted  HENT:   Head: Normocephalic.   Eyes: Pupils are equal, round  Neck: Normal range of motion.   Cardiovascular: Normal rate, regular rhythm and normal heart sounds.    Pulmonary/Chest: Effort normal and breath sounds are clear. 3L NC.  Abdominal: Soft. Bowel sounds are active.   Musculoskeletal: generalized weakness  Neurological: Alert and oriented to person and place. Confused. Inattention. RUE tremor.  Psychiatric: Flat affect.   Skin: Skin is warm and dry. Full skin assessment completed bu NP on initial exam. Extensive generalized  bruising in various stages of healing. Multiple active wounds as follows:       Altered Skin Integrity 06/03/23 1300  Groin Moisture associated dermatitis (Active) POA   Location: Groin   Is this injury device related?: No   Primary Wound Type: Moisture ass   Wound Image     Description of Altered Skin Integrity Intact skin with non-blanchable redness of localized area   Dressing Appearance Open to air   Drainage Amount None    Drainage Characteristics/Odor No odor   Appearance Red;Moist   Tissue loss description Partial thickness   Wound Edges Irregular            Altered Skin Integrity 06/03/23 1900 Right Throat Blister(s) Purple or maroon localized area of discolored intact skin or non-intact skin or a blood-filled blister. (Active) POA   Side: Right   Location: Throat   Is this injury device related?: No   Primary Wound Type: Blister(s)   Description of Altered Skin Integrity: Purple or maroon localized area of discolored intact skin or non-intact skin or a blood-filled blister.   Description of Altered Skin Integrity Intact skin with non-blanchable redness of localized area   Dressing Appearance Open to air   Drainage Amount None   Drainage Characteristics/Odor No odor   Appearance Dry;Mar[place]            Altered Skin Integrity 06/05/23 1154 Left posterior Greater trochanter Moisture associated dermatitis (Active) POA   Side: Left   Orientation: posterior   Location: Greater trochanter   Primary Wound Type: Moisture ass   Description of Altered Skin Integrity Intact skin with non-blanchable redness of localized area   Dressing Appearance Open to air   Drainage Amount None   Drainage Characteristics/Odor No odor   Appearance Pink;Red   Tissue loss description Partial thickness   Periwound Area Pink;Moist            Altered Skin Integrity 06/05/23 1154 Right posterior Greater trochanter Moisture associated dermatitis (Active) POA   Side: Right   Orientation: posterior   Location: Greater trochanter   Primary Wound Type: Moisture ass   Wound Image     Description of Altered Skin Integrity Intact skin with non-blanchable redness of localized area   Dressing Appearance Open to air   Drainage Amount None   Drainage Characteristics/Odor No odor   Appearance Red   Tissue loss description Partial thickness   Periwound Area Pink;Moist            Altered Skin Integrity 06/18/23 2100 medial Nose Blister(s) Intact skin with non-blanchable redness  of localized area (Active) POA   Orientation: medial   Location: Nose   Primary Wound Type: Blister(s)   Description of Altered Skin Integrity: Intact skin with non-blanchable redness of localized area   Wound Image     Description of Altered Skin Integrity Full thickness tissue loss. Base is covered by slough and/or eschar in the wound bed   Dressing Appearance Open to air;Dried drainage   Drainage Amount None   Drainage Characteristics/Odor No odor   Appearance Dry   Tissue loss description Partial thickness   Periwound Area Redness   Wound Edges Irregular            Altered Skin Integrity 06/25/23 1001 midline Frontal region Other (comment) Full thickness tissue loss. Subcutaneous fat may be visible but bone, tendon or muscle are not exposed (Active) POA   Orientation: midline   Location: Frontal region   Primary Wound Type: Other   Description of Altered Skin Integrity: Full thickness tissue loss. Subcutaneous fat may be visible but bone, tendon or muscle are not exposed   Description of Altered Skin Integrity Partial thickness tissue loss. Shallow open ulcer with a red or pink wound bed, without slough. Intact or Open/Ruptured Serum-filled blister.   Dressing Appearance Open to air   Drainage Amount None   Drainage Characteristics/Odor No odor   Appearance Dry   Tissue loss description Partial thickness            Altered Skin Integrity 06/27/23 0506 upper Sternal Skin Tear (Active) POA   Orientation: upper   Location: Sternal   Primary Wound Type: Skin tear   Wound Image     Description of Altered Skin Integrity Partial thickness tissue loss. Shallow open ulcer with a red or pink wound bed, without slough. Intact or Open/Ruptured Serum-filled blister.   Dressing Appearance Dry;Intact;Clean   Drainage Amount None   Drainage Characteristics/Odor No odor   Appearance Red   Tissue loss description Partial thickness   Red (%), Wound Tissue Color 100 %   Periwound Area Redness;Moist   Wound Edges Irregular   Wound  "Length (cm) 2 cm   Wound Width (cm) 2 cm   Wound Depth (cm) 0.1 cm   Wound Volume (cm^3) 0.4 cm^3   Wound Surface Area (cm^2) 4 cm^2            Altered Skin Integrity 06/29/23 1523 Sacral spine Moisture associated dermatitis (Active) POA   Location: Sacral spine   Wound Number:    Is this injury device related?:    Primary Wound Type: Moisture ass   Wound Image     Dressing Appearance Open to air   Drainage Amount None   Drainage Characteristics/Odor No odor   Appearance Red   Tissue loss description Partial thickness   Periwound Area Moist;[place]   Wound Edges Irregular   Wounds followed closely by Wound Care.      No results for input(s): GLUCOSE, NA, K, CL, CO2, BUN, CREATININE, MG in the last 24 hours.    Invalid input(s):  CALCIUM    No results for input(s): WBC, RBC, HGB, HCT, PLT, MCV, MCH, MCHC in the last 24 hours.    No results for input(s): POCTGLUCOSE in the last 168 hours.     Assessment and Plan:    Non-convulsive status epilepticus  Acute metabolic encephalopathy  Maintain seizure precautions  PO Keppra 1G BID  Rifaximin po 550 mg BID  Lactulose po 20 g TID - titrate for > 3 BMs q day     Acute respiratory failure with hypoxia  6/2 Intubated for airway protection r/t status epilepticus; extubated 6/16 to RA  Patient requiring 2 to 3 L NC to maintain SpO2 > 90% since SNF admit  RT consulted for oxygen monitoring, titration, and weaning of O2 per sat goal     Acute metabolic encephalopathy  Multifactorial; r/t Hepatic encephalopathy & Non-convulsive status epilepticus  Proteus mirabilis UTI, resolved in hospital s/p 7D course rocephin   Maintain Delirium precautions  See "Hepatic encephalopathy" and "Non-convulsive status epilepticus"     Hepatic encephalopathy  Chronic, unstable.  Persistent confusion - worsened from baseline per sister  Elevated ammonia on hospital admit, WNL by discharge.   Continue Lactulose and rifaximin  Titrate therapy prn to goal for BM > 3 a day     Acute deep vein thrombosis " (DVT) of brachial vein of right upper extremity  Deep vein thrombosis (DVT) of femoral vein of right lower extremity  Right IJ DVT  Argatroban transitioned to heparin then stopped d/t retroperitoneal hemorrhage and thrombocytopenia.   Now S/P IVC filter placed 6/14   Maintain SCDs      Macrocytic anemia   Anemia of chronic disease           Recent H/O Acute blood loss anemia  Recent H/O Large Right retroperitoneal bleed  Recent H/O Hypovolemic shock  Received 9 units PRBCs, 11 platelet doses, 3 FFP, 2 cryoprecipitate in short term acute  S/P IR embolization of left lumbar and internal iliac branch 6/13  Previously on epoetin  Maintain bleeding precautions  Monitor Hgb with CBC Diff q T/Fri   Transfuse PRBC for Hgb < 7.0 and / or active bleeding     Thrombocytopenia  Chronic, due to liver failure  S/p 11 platelet doses, 3 FFP, 2 cryoprecipitate in short term acute  Bleeding precautions  Monitor with routinely scheduled CBC Diff  Transfuse for platelets <15 to <20K if pt develops fever, infection, or inflammation  Prophylactically transfuse for platelets < 10K to prevent spontaneous bleeding      Acute liver failure with hepatic coma  Ascites  R/T Alcohol abuse, in remission  Furosemide 40 mg BID hold for SBP <100 or DBP <60  Spironolactone 50 mg qhs  Thiamine 100 mg po daily  Refer patient to outpatient hepatology at discharge or during SNF admit if indicated     Bipolar 1 disorder  As per Psych consult day of hospital discharge 6/30:  - Continue lithium 150mg qhs, stop trazodone  - Decreased q HS Zyprexa from 10 to 5 mg per psych recs  - Goal lithium level 0.6-1.2mEq/L.   - Check lithium level Sunday 7/2 and monitor renal function.   - Follow-up with outpt [facility], Dr. Coates, as early as 7/3.  -Telehealth psych consult placed lithium level is 0.1     Severe protein-calorie malnutrition       Body mass index is 18.02 kg/m².       Nutrition consulted.        TID nutritional supplements       MVI, Thiamine, Zinc po  daily    Tremor RUE  On propanolol       Diet:  low sodium dysphagia soft diet. Boost plus TID with meals  GI PPx: not needed  DVT PPx:  SCD/SINCERE, s/p IVC. AC held due to thrombocytopenia  Airways: room air  Wounds: nasal bridge skin tear, skin tears on BUE    Anticipate disposition:  Home with home health      Follow-up needed during SNF stay- Telepsych, possibly hepatology    Follow-up needed after discharge from SNF: PCP, Hepatology, Psychiatry    No future appointments.      I certify that SNF services are required to be given on an inpatient basis because Marely Hamilton needs for skilled nursing care and/or skilled rehabilitation are required on a daily basis and such services can only practically be provided in a skilled nursing facility setting and are for an ongoing condition for which she received inpatient care in the hospital.     Total time of the visit 30 minutes   Non physical exam/ non charting time: 30 minutes   Description of non physical exam/non charting time: counseling patient on clinical conditions and therapies provided.  Extensive chart review completed including all consultation notes.  All pertinent laboratory and radiographical images reviewed.        PUSHPA PAREKH  Department of Hospital Medicine   [facility]     DOS: 7/3/2023       Patient note was created using MModal Dictation.  Any errors in syntax or even information may not have been identified and edited on initial review prior to signing this note.

## 2023-07-03 NOTE — PT/OT/SLP PROGRESS
Occupational Therapy   Treatment    Name: Marely Hamilton  MRN: [id number]  Admit Date: 6/30/2023  Admitting Diagnosis:  Non-convulsive status epilepticus    General Precautions: Standard, fall, aspiration, seizure   Orthopedic Precautions: N/A   Braces: N/A    Recommendations:     Discharge Recommendations:   (TBD - pt requires significant physical assistance at this time for 24/7)  Level of Assistance Recommended at Discharge: 24 hours physical assistance for all ADL's and home management tasks  Discharge Equipment Recommendations:  (TBD)  Barriers to discharge:  Decreased caregiver support, Inaccessible home environment    Assessment:     Marely Hamilton is a 58 y.o. female with a medical diagnosis of Non-convulsive status epilepticus.  She presents with limitations in performance of self-care, functional mobility, and ADLs. Performance deficits affecting function are weakness, impaired endurance, impaired sensation, impaired self care skills, impaired functional mobility, gait instability, impaired balance, impaired cognition, decreased coordination, decreased upper extremity function, decreased lower extremity function, decreased safety awareness, impaired coordination, impaired skin. Pt was agreeable to Tx, but noted with decreased participation in eval due to decreased attention to task, difficulty with initiation, difficulty following simple commands, and weakness. Pt is making progress but continues to require assist to perform self care tasks, functional mobility and functional transfers. Pt would continue to benefit from OT intervention to further functional (I)ce and safety.     Rehab Potential is poor    Activity tolerance:  Poor    Plan:     Patient to be seen 5 x/week to address the above listed problems via self-care/home management, therapeutic activities, therapeutic exercises, cognitive retraining    Plan of Care Expires: 07/31/23  Plan of Care Reviewed with: patient    Subjective     Communicated  with: Nursing prior to session.     Pain/Comfort:  Pain Rating 1: 0/10  Pain Rating Post-Intervention 1: 0/10    Patient's cultural, spiritual, Sikhism conflicts given the current situation:  no    Objective:     Patient found HOB elevated with oxygen (not donned) and [employer] telesitter upon OT entry to room.    Bed Mobility:    Patient completed Rolling/Turning to Left with  moderate assistance and with side rail  Patient completed Rolling/Turning to Right with minimum assistance and with side rail  Patient completed Scooting/Bridging with total assistance and 2 persons  Patient completed Supine to Sit with total assistance and 2 persons     Functional Mobility/Transfers:  Patient completed Sit <> Stand Transfer with maximal assistance and with 2nd person at SBA for safety  with  no assistive device   Patient completed Bed <> Chair Transfer using Stand Pivot technique with maximal assistance and with 2nd person at SBA for safety with no assistive device    Activities of Daily Living:  Grooming: moderate assistance seated sinkside with set up with v/c for sequencing for oral care and to wash face.   Upper Body Dressing: moderate/maximal  assistance to doff/corey pull over shirt with v/c for sequencing and encouragement   Lower Body Dressing: total assistance and of 2 persons to corey pants at bed level  Toileting: total assistance and of 2 persons to doff/corey pull tab brief at bed level     AMPAC 6 Click ADL: 9    Treatment & Education:  Pt educated on:  - role of OT  - level of assistance  - energy conservation and task modification to maximized independence with ADL's and mobility   -  safety while performing functional transfers and self care tasks  - progress towards OT goals      Patient left up in chair with call button in reach and [employer] telesitter present    GOALS:   Multidisciplinary Problems       Occupational Therapy Goals          Problem: Occupational Therapy    Goal Priority Disciplines Outcome  Interventions   Occupational Therapy Goal     OT, PT/OT Ongoing, Progressing    Description: Goals to be met by: 08/01/23     Patient will increase functional independence with ADLs by performing:    UE Dressing with Minimal Assistance.  LE Dressing with Moderate Assistance.  Grooming while seated at sink with Minimal Assistance.  Toileting from bedside commode with Maximum Assistance for hygiene and clothing management.   Toilet transfer to bedside commode with Moderate Assistance.  Upper extremity exercise program 3x15 reps per handout, with assistance as needed to increase UE strength/endurance and coordination to improve func mobility and ADL skills.                         Time Tracking:     OT Date of Treatment: 07/03/23  OT Start Time: 1305    OT Stop Time: 1345  OT Total Time (min): 40 min    Billable Minutes:Self Care/Home Management 40    7/3/2023  A client care conference was performed between the LOTR and DREA, prior to treatment by DREA, to discuss the patient's status, treatment plan and established goals.

## 2023-07-03 NOTE — PLAN OF CARE
Problem: Occupational Therapy  Goal: Occupational Therapy Goal  Description: Goals to be met by: 08/01/23     Patient will increase functional independence with ADLs by performing:    UE Dressing with Minimal Assistance.  LE Dressing with Moderate Assistance.  Grooming while seated at sink with Minimal Assistance.  Toileting from bedside commode with Maximum Assistance for hygiene and clothing management.   Toilet transfer to bedside commode with Moderate Assistance.  Upper extremity exercise program 3x15 reps per handout, with assistance as needed to increase UE strength/endurance and coordination to improve func mobility and ADL skills.    Outcome: Ongoing, Progressing

## 2023-07-03 NOTE — ASSESSMENT & PLAN NOTE
Malnutrition Type:  Context: social/environmental circumstances  Level: severe    Related to (etiology):   Decline in ADL's, physiological cause    Malnutrition Characteristic Summary:  Weight Loss (Malnutrition): greater than 10% in 6 months  Energy Intake (Malnutrition): less than 75% for greater than or equal to 3 months  Subcutaneous Fat (Malnutrition): severe depletion  Muscle Mass (Malnutrition): severe depletion      Interventions/Recommendations (treatment strategy):  Contnue mech soft level 5 diet, fluid per MD, 2 gram sodium, boost plus TID,thin liquids, RD following    Nutrition Diagnosis Status:   Continues

## 2023-07-03 NOTE — PT/OT/SLP PROGRESS
"Physical Therapy Treatment    Patient Name:  Marely Hamilton   MRN:  [id number]  Admit Date: 6/30/2023  Admitting Diagnosis: Non-convulsive status epilepticus  Recent Surgeries: EMBOLIZATION, BLOOD VESSEL    General Precautions: Standard, aspiration, fall  Orthopedic Precautions: N/A  Braces: N/A    Recommendations:     Discharge Recommendations:  (TBD; pt requires 24/7 assistance)  Level of Assistance Recommended at Discharge: 24 hours significant assistance  Discharge Equipment Recommendations:  (TBD)  Barriers to discharge: Decreased caregiver support (increased skilled assistance needed)    Assessment:     Marely Hamilton is a 58 y.o. female admitted with a medical diagnosis of Non-convulsive status epilepticus . Pt tolerated. Pt requiring Total Assist for transfers. Pt unable to propel w/c with BUE with vc,tc, education and demonstration. Pt unable to perform more than a few reps for seated TE. Pt not focused to task, pt very anxious upon returning to bed. Pt would continue to benefit from skilled PT services to improve overall functional mobility, strength and endurance.      Performance deficits affecting function: weakness, impaired functional mobility, impaired cognition, decreased safety awareness, impaired cardiopulmonary response to activity, impaired endurance, pain, impaired coordination, impaired balance, impaired skin, impaired self care skills, decreased lower extremity function, decreased ROM.    Rehab Potential is fair    Activity Tolerance: Fair    Plan:     Patient to be seen 5 x/week to address the above listed problems via gait training, therapeutic activities, therapeutic exercises, neuromuscular re-education, wheelchair management/training    Plan of Care Expires: 07/31/23  Plan of Care Reviewed with: patient    Subjective     "I don't know you".     Pain/Comfort:  Pain Rating 1: 0/10  Pain Rating Post-Intervention 1: 0/10    Patient's cultural, spiritual, Holiness conflicts given the current " "situation:  no    Objective:      Patient found up in chair with oxygen upon PT entry to room.     Therapeutic Activities and Exercises: seated TE (A/AA): hip flex, LAQ, AP x 10 reps for BLE strengthening. Pt unable to complete d/t inability to focus on task at hand. Pt receiving education, vc, tc yet unable to complete    LE ergo x 10 mins for BLE strengthening, endurance and ROM, PTA assisting pt make full revolutions. Pt without attention to task at hand    Patient educated on role of therapy, goals of session, and benefits of out of bed mobility.   Instructed on use of call button and importance of calling nursing staff for assistance with mobility   Questions/concerns addressed within PTA scope of practice  Pt verbalized understanding.    Functional Mobility:  Bed Mobility:     Sit to Supine: total assistance and of 2 persons, LE and trunk management  Transfers:     Bed to Chair: maximal assistance, total assistance, and of 2 persons with  no AD  using  Squat Pivot  Wheelchair Propulsion:  "I can't"  attempted with pt using vc, tc and demo  AM-PAC 6 CLICK MOBILITY  9    Patient left supine with call button in reach.    GOALS:   Multidisciplinary Problems       Physical Therapy Goals          Problem: Physical Therapy    Goal Priority Disciplines Outcome Goal Variances Interventions   Physical Therapy Goal     PT, PT/OT Ongoing, Progressing     Description: Goals to be met by: 2023     Patient will increase functional independence with mobility by performin. Supine to sit with Stand-by Assistance  2. Sit to supine with Stand-by Assistance  3. Rolling to Left and Right with Stand-by Assistance.  4. Sit to stand transfer with Minimal Assistance  5. Bed to chair transfer with Minimal Assistance   6. Wheelchair propulsion x 50 feet with Stand-by Assistance using bilateral upper extremities  7. Sitting at edge of bed x 25 minutes with Supervision  8. Stand for 2 minutes with Stand-by Assistance using " Rolling Walker                         Time Tracking:     PT Received On: 07/03/23  PT Start Time: 1457  PT Stop Time: 1540  PT Total Time (min): 43 min    Billable Minutes: Therapeutic Activity 28 and Therapeutic Exercise 15    Treatment Type: Treatment  PT/PTA: PTA     Number of PTA visits since last PT visit: 1 07/03/2023

## 2023-07-03 NOTE — CONSULTS
[facility] - Skilled Nursing  Adult Nutrition  Consult Note    SUMMARY   Recommendations  Contnue [facility] soft level 5 diet, fluid per MD, 2 gram sodium, boost plus TID,RD following  Goals: PO to meet 50% of needs with ONS by next RD follow up  Nutrition Goal Status: new  Communication of RD Recs: reviewed with physician    Assessment and Plan    Endocrine  Severe protein-calorie malnutrition  Malnutrition Type:  Context: social/environmental circumstances  Level: severe    Related to (etiology):   Decline in ADL's, physiological cause    Malnutrition Characteristic Summary:  Weight Loss (Malnutrition): greater than 10% in 6 months  Energy Intake (Malnutrition): less than 75% for greater than or equal to 3 months  Subcutaneous Fat (Malnutrition): severe depletion  Muscle Mass (Malnutrition): severe depletion      Interventions/Recommendations (treatment strategy):  Contnue [facility] soft level 5 diet, fluid per MD, 2 gram sodium, boost plus TID,thin liquids, RD following    Nutrition Diagnosis Status:   Continues         Malnutrition Assessment 7/3  Malnutrition Context: social/environmental circumstances  Malnutrition Level: severe  Skin (Micronutrient): bruised, wounds unhealed, thinned  Nails (Micronutrient): clubbed  Hair/Scalp (Micronutrient): dry  Eyes (Micronutrient): conjunctiva dull  Teeth (Micronutrient): broken dentition  Tongue (Micronutrient): red  Neck/Chest (Micronutrient): bony prominence, muscle wasting, subcutaneous fat loss  Musculoskeletal/Lower Extremities: muscle wasting, subcutaneous fat loss   Micronutrient Evaluation Summary: suspected deficiency  Micronutrient Evaluation Comments: protein, iron, B vitamins   Weight Loss (Malnutrition): greater than 10% in 6 months  Energy Intake (Malnutrition): less than 75% for greater than or equal to 3 months  Subcutaneous Fat (Malnutrition): severe depletion  Muscle Mass (Malnutrition): severe depletion   Orbital Region (Subcutaneous Fat Loss): severe  "depletion  Upper Arm Region (Subcutaneous Fat Loss): severe depletion  Thoracic and Lumbar Region: severe depletion   Orthodoxy Region (Muscle Loss): moderate depletion  Clavicle Bone Region (Muscle Loss): severe depletion  Clavicle and Acromion Bone Region (Muscle Loss): severe depletion  Patellar Region (Muscle Loss): severe depletion  Anterior Thigh Region (Muscle Loss): severe depletion  Posterior Calf Region (Muscle Loss): severe depletion                 Reason for Assessment    Reason For Assessment: consult  Diagnosis:  (Non-convulsive status epilepticus)  Relevant Medical History: seizure disorder, acute liver failure, encephalopthy, anemia, DVT, bipolar, ETOH abuse in remission, severe protein calories malnutrition,  Interdisciplinary Rounds: did not attend  General Information Comments: Cachetic , more weight loss since last admit here at SNF, po poor to date,  Nutrition Discharge Planning: DC on low sodium diet, ONS of choice, texture per SLP    Nutrition Risk Screen    Nutrition Risk Screen: reduced oral intake over the last month    Nutrition/Diet History    Patient Reported Diet/Restrictions/Preferences: general  Spiritual, Cultural Beliefs, Mu-ism Practices, Values that Affect Care: no  Supplemental Drinks or Food Habits: Ensure Plus  Food Allergies: NKFA  Factors Affecting Nutritional Intake: chewing difficulties/inability to chew food, difficulty/impaired swallowing, decreased appetite, depression  Nutrition Support Provision Prior to Admit: Impact Peptide at 40 ml/hr    Anthropometrics    Temp: 99 °F (37.2 °C)  Height Method: Stated  Height: 5' 2" (157.5 cm)  Height (inches): 62 in  Weight Method: Bed Scale  Weight: 44.7 kg (98 lb 8.7 oz)  Weight (lb): 98.55 lb  Ideal Body Weight (IBW), Female: 110 lb  % Ideal Body Weight, Female (lb): 89.59 %  BMI (Calculated): 18  BMI Grade: 18.5-24.9 - normal  Weight Loss: unintentional  Usual Body Weight (UBW), k.36 kg  Weight Change Amount:  (24% weight " change since beginning of year. 58.9 kg down to current 44.7  kg)  % Usual Body Weight: 87.21  % Weight Change From Usual Weight: -12.97 %       Lab/Procedures/Meds    Pertinent Labs Reviewed: reviewed  Pertinent Labs Comments: Hg 8.5, Hct 25.8, alblumn 2.8, Bili  7.3, Cl 111,  Pertinent Medications Reviewed: reviewed  Pertinent Medications Comments: zinc, folic acid, MVI, thiamine, lithium, lactulose, senna-docusate, ABx,       Estimated/Assessed Needs    Weight Used For Calorie Calculations: 44.7 kg (98 lb 8.7 oz)  Energy Calorie Requirements (kcal): 2128-3484  Energy Need Method: Kcal/kg (35-40 kcal/kg)  Protein Requirements: 77g  Weight Used For Protein Calculations: 51.4 kg (113 lb 5.1 oz) (UBW kg x 1.5)  Fluid Requirements (mL): 1500 per MD     RDA Method (mL): 1564         Nutrition Prescription Ordered    Current Diet Order: IDDSI level 5 mech soft, 2 gram sodium, 1500 ml fluid restricion  Nutrition Order Comments: PO 25%  Oral Nutrition Supplement: boost plus chocolate    Evaluation of Received Nutrient/Fluid Intake    I/O: no data  Energy Calories Required: not meeting needs  Protein Required: not meeting needs  Fluid Required: meeting needs  Comments: LBM 7/3  Tolerance: tolerating  % Intake of Estimated Energy Needs: 25 - 50 %  % Meal Intake: 0 - 25 %    Nutrition Risk    Level of Risk/Frequency of Follow-up: high (two times per week)       Monitor and Evaluation    Food and Nutrient Intake: food and beverage intake  Food and Nutrient Adminstration: diet order  Knowledge/Beliefs/Attitudes: beliefs and attitudes  Physical Activity and Function: nutrition-related ADLs and IADLs  Anthropometric Measurements: weight change  Biochemical Data, Medical Tests and Procedures: electrolyte and renal panel, gastrointestinal profile  Nutrition-Focused Physical Findings: overall appearance       Nutrition Follow-Up    RD Follow-up?: Yes

## 2023-07-04 PROCEDURE — 99306 1ST NF CARE HIGH MDM 50: CPT | Mod: ,,, | Performed by: HOSPITALIST

## 2023-07-04 PROCEDURE — 25000003 PHARM REV CODE 250: Performed by: FAMILY MEDICINE

## 2023-07-04 PROCEDURE — 25000003 PHARM REV CODE 250: Performed by: HOSPITALIST

## 2023-07-04 PROCEDURE — 99306 PR NURSING FACILITY CARE, INIT, HIGH SEVERITY: ICD-10-PCS | Mod: ,,, | Performed by: HOSPITALIST

## 2023-07-04 PROCEDURE — 11000004 HC SNF PRIVATE

## 2023-07-04 RX ORDER — MUPIROCIN 20 MG/G
OINTMENT TOPICAL 2 TIMES DAILY
Status: DISCONTINUED | OUTPATIENT
Start: 2023-07-04 | End: 2023-07-06 | Stop reason: HOSPADM

## 2023-07-04 RX ADMIN — LACTULOSE 20 G: 20 SOLUTION ORAL at 02:07

## 2023-07-04 RX ADMIN — THERA TABS 1 TABLET: TAB at 08:07

## 2023-07-04 RX ADMIN — RIFAXIMIN 550 MG: 550 TABLET ORAL at 08:07

## 2023-07-04 RX ADMIN — FOLIC ACID 1 MG: 1 TABLET ORAL at 08:07

## 2023-07-04 RX ADMIN — BACITRACIN: 500 OINTMENT TOPICAL at 08:07

## 2023-07-04 RX ADMIN — MUPIROCIN: 20 OINTMENT TOPICAL at 09:07

## 2023-07-04 RX ADMIN — FUROSEMIDE 40 MG: 40 TABLET ORAL at 08:07

## 2023-07-04 RX ADMIN — RIFAXIMIN 550 MG: 550 TABLET ORAL at 09:07

## 2023-07-04 RX ADMIN — LITHIUM CARBONATE 150 MG: 150 CAPSULE, GELATIN COATED ORAL at 09:07

## 2023-07-04 RX ADMIN — MICONAZOLE NITRATE: 20 OINTMENT TOPICAL at 09:07

## 2023-07-04 RX ADMIN — BACITRACIN: 500 OINTMENT TOPICAL at 09:07

## 2023-07-04 RX ADMIN — MICONAZOLE NITRATE 2 % TOPICAL POWDER: at 09:07

## 2023-07-04 RX ADMIN — MUPIROCIN: 20 OINTMENT TOPICAL at 12:07

## 2023-07-04 RX ADMIN — MICONAZOLE NITRATE 2 % TOPICAL POWDER: at 08:07

## 2023-07-04 RX ADMIN — SPIRONOLACTONE 50 MG: 25 TABLET, FILM COATED ORAL at 08:07

## 2023-07-04 RX ADMIN — OLANZAPINE 5 MG: 2.5 TABLET, FILM COATED ORAL at 09:07

## 2023-07-04 RX ADMIN — ZINC SULFATE 220 MG (50 MG) CAPSULE 220 MG: CAPSULE at 08:07

## 2023-07-04 RX ADMIN — SENNOSIDES AND DOCUSATE SODIUM 1 TABLET: 50; 8.6 TABLET ORAL at 08:07

## 2023-07-04 RX ADMIN — LACTULOSE 20 G: 20 SOLUTION ORAL at 08:07

## 2023-07-04 RX ADMIN — MICONAZOLE NITRATE: 20 OINTMENT TOPICAL at 08:07

## 2023-07-04 RX ADMIN — LEVETIRACETAM 1000 MG: 500 TABLET, FILM COATED ORAL at 09:07

## 2023-07-04 RX ADMIN — PROPRANOLOL HYDROCHLORIDE 20 MG: 10 TABLET ORAL at 08:07

## 2023-07-04 RX ADMIN — BACITRACIN: 500 OINTMENT TOPICAL at 02:07

## 2023-07-04 RX ADMIN — Medication 100 MG: at 08:07

## 2023-07-04 RX ADMIN — LEVETIRACETAM 1000 MG: 500 TABLET, FILM COATED ORAL at 08:07

## 2023-07-04 NOTE — NURSING
Pt. Allowed another staff nurse to give her continent care and change her bed.will continue to monitor.   Posterior Auricular Interpolation Flap Text: A decision was made to reconstruct the defect utilizing an interpolation axial flap and a staged reconstruction.  A telfa template was made of the defect.  This telfa template was then used to outline the posterior auricular interpolation flap.  The donor area for the pedicle flap was then injected with anesthesia.  The flap was excised through the skin and subcutaneous tissue down to the layer of the underlying musculature.  The pedicle flap was carefully excised within this deep plane to maintain its blood supply.  The edges of the donor site were undermined.   The donor site was closed in a primary fashion.  The pedicle was then rotated into position and sutured.  Once the tube was sutured into place, adequate blood supply was confirmed with blanching and refill.  The pedicle was then wrapped with xeroform gauze and dressed appropriately with a telfa and gauze bandage to ensure continued blood supply and protect the attached pedicle.

## 2023-07-04 NOTE — PLAN OF CARE
Problem: Adult Inpatient Plan of Care  Goal: Plan of Care Review  Outcome: Ongoing, Progressing  Goal: Patient-Specific Goal (Individualized)  Outcome: Ongoing, Progressing  Goal: Absence of Hospital-Acquired Illness or Injury  Outcome: Ongoing, Progressing  Goal: Optimal Comfort and Wellbeing  Outcome: Ongoing, Progressing  Goal: Readiness for Transition of Care  Outcome: Ongoing, Progressing     Problem: Fluid and Electrolyte Imbalance (Acute Kidney Injury/Impairment)  Goal: Fluid and Electrolyte Balance  Outcome: Ongoing, Progressing     Problem: Oral Intake Inadequate (Acute Kidney Injury/Impairment)  Goal: Optimal Nutrition Intake  Outcome: Ongoing, Progressing     Problem: Renal Function Impairment (Acute Kidney Injury/Impairment)  Goal: Effective Renal Function  Outcome: Ongoing, Progressing     Problem: Impaired Wound Healing  Goal: Optimal Wound Healing  Outcome: Ongoing, Progressing     Problem: Adjustment to Illness (Delirium)  Goal: Optimal Coping  Outcome: Ongoing, Progressing     Problem: Altered Behavior (Delirium)  Goal: Improved Behavioral Control  Outcome: Ongoing, Progressing     Problem: Attention and Thought Clarity Impairment (Delirium)  Goal: Improved Attention and Thought Clarity  Outcome: Ongoing, Progressing     Problem: Sleep Disturbance (Delirium)  Goal: Improved Sleep  Outcome: Ongoing, Progressing     Problem: Skin Injury Risk Increased  Goal: Skin Health and Integrity  Outcome: Ongoing, Progressing     Problem: Malnutrition  Goal: Improved Nutritional Intake  Outcome: Ongoing, Progressing

## 2023-07-04 NOTE — CONSULTS
Patient seen for wound care consultation.   Reviewed chart for this encounter.   See Flow Sheet for findings.    Pt sitting up in bed with RN at bedside, pt agreed to assessment, noted bruises on several locations on her body, hydrocolloid on sternum covering skin tear. Large bruise noted to right lateral side, pt was able to raymundo self for sacral assessment, had on diaper wet with urine, this was removed, pt cleansed with soap and water, applied triad, no diapers / no foam.   MASD to groin area--Triad applied  Face/bridge of nose--recommend DC and mupirocin BID    RECOMMENDATIONS:  Keep pt clean and dry / no diapers / no foam borders.   Apply Triad to MASD on groin and sacral area.     Discussed POC with patient and primary nurse.   See EMR for orders & patient education.    Discussed nutrition and the role of protein in wound healing with the patient. Instructed patient to optimize protein for wound healing.    Nursing to continue care.  Nursing to maintain pressure injury prevention interventions.       07/04/23 1059   WOCN Assessment   WOCN Total Time (mins) 30   Visit Date 07/04/23   Visit Time 0830   Consult Type New   WOCN Speciality Wound   Wound moisture;skin tear   Intervention assessed;changed;applied;chart review;orders   Teaching on-going;complication        Altered Skin Integrity 06/03/23 1900 Right Throat Blister(s) Purple or maroon localized area of discolored intact skin or non-intact skin or a blood-filled blister.   Date First Assessed/Time First Assessed: 06/03/23 1900   Altered Skin Integrity Present on Admission - Did Patient arrive to the hospital with altered skin?: yes  Side: Right  Location: Throat  Is this injury device related?: No  Primary Wound Type: B...   Wound Image         Altered Skin Integrity 06/18/23 2100 medial Nose Blister(s) Intact skin with non-blanchable redness of localized area   Date First Assessed/Time First Assessed: 06/18/23 2100   Altered Skin Integrity Present on  Admission - Did Patient arrive to the hospital with altered skin?: suspected hospital acquired  Orientation: medial  Location: Nose  Primary Wound Type: Blister...   Wound Image         Altered Skin Integrity 06/29/23 1523 Sacral spine Moisture associated dermatitis   Date First Assessed/Time First Assessed: 06/29/23 1523   Location: Sacral spine  Primary Wound Type: Moisture associated dermatitis   Wound Image    Dressing Appearance No dressing;Open to air   Drainage Amount None   Drainage Characteristics/Odor No odor   Appearance Pink;Red   Tissue loss description Partial thickness   Periwound Area Moist   Wound Edges Irregular   Care Cleansed with:;Antimicrobial agent   Dressing Applied  (Triad)   Off Loading Other (see comments)   Dressing Change Due 07/04/23

## 2023-07-04 NOTE — H&P
"Hospital Medicine  Skilled Nursing Facility   History and Physical Exam      Date of Service: 07/04/2023      Patient Name: Marely Hamilton  MRN: [id number]  Admission Date: 6/30/2023  Attending Physician: Tim Quan MD  Primary Care Provider: Viktor Ross MD  Code Status: Full Code      Principal problem:  Non-convulsive status epilepticus      Chief Complaint:   Chief Complaint   Patient presents with    Altered Mental Status     Admitted to [facility] for rehabilitation following hospital stay for acute encephalopathy due to non-convulsive status epilepticus.           HPI:   "Marely Hamilton is a 58 y.o. female with past psychiatric history of bipolar disorder, alcohol use disorder & past pertinent medical history of seizure disorder (on AEDs), ETOH cirrhosis presents to the ED/admitted to hospital on 5/28 for Acute encephalopathy.      Frequent left hemispheric electrographic seizures and NCSE on EEG. Treated with Keppra, lactulose and rifaximin. Intubated for airway protection; extubated 6/16. Completed rocephin x 7 days for Proteus mirabilis UTI. No epileptiform discharges on EEG x 24. Acute DVTs Right brachial, femoral, and IJ. Tx argatroban-->heparin then developed large retroperitoneal hemorrhage requiring 9 units PRBCs, 11 platelet doses, 3 FFP, 2 cryoprecipitate. Now S/P IR embolization of left lumbar and internal iliac branch and S/P IVC filter placement.      Hypercalcemia treated by holding lithium. H/O severe episodes of crystal with rapid decompensation.  Previously very stable on Lithium for mood stabilization. Per psych consult 6/30: Continue lithium 150mg qhs, stop trazodone, and decrease q HS Zyprexa from 10 to 5 mg as early as 7/2. Goal lithium level 0.6-1.2mEq/L. Check lithium level Sunday 7/2 and monitor renal function. Follow-up with outpt [facility], Dr. Coates, as early as 7/3.     Patient will be treated at [facility] with PT and OT to improve functional status and ability to perform ADLs. " "Consults placed to RT, RD, SLP, PT/OT, Wound Care, Case Management, TelePsychiatry. Sister plans to come to [place] to assist with transition from SNF to home with sitter with long-term goal of moving patient to [facility] closer to sister." Per Kathy Reno, NP on 7/1/23.     She says that she is feeling "paranoid" today. She cannot specifically identify a particular thing that is bothering her or what she is paranoid about. She is eating okay and denies any abdominal pain. Having regular BMs and urinating well.     Patient admitted with skilled services with PT and OT to improve functional status and ability to perform ADLs.       Past Medical History:   Past Medical History:   Diagnosis Date    Addiction to drug     Alcohol abuse     Alcohol abuse, in remission 6/15/2015    14.5 weeks ago; AA weekly    Anemia     Anxiety 6/15/2015    Behavioral problem     Bipolar disorder     Bipolar disorder in remission 6/15/2015    Cirrhosis, Laennec's 6/15/2015    Depression     Encounter for blood transfusion     Epistaxis 6/15/2015    Fatigue     Glaucoma     Hematuria     Hepatic encephalopathy 6/15/2015    Hepatic enlargement     History of psychiatric care     History of psychiatric hospitalization     History of seizure 6/15/2015    1    hx of intentional Tylenol overdose 6/15/2015    2005- situational and hx of bipolar    Hx of psychiatric care     Macrocytic anemia 9/18/2015    6 units PRBC since June 2015    Jeana     Osteoarthritis     Other ascites 6/15/2015    Psychiatric exam requested by authority     Psychiatric problem     Psychosis 9/26/2019    Renal disorder     Seizures     Self-harming behavior     Suicide attempt     Therapy     Thrombocytopenia 6/15/2015       Past Surgical History:   Past Surgical History:   Procedure Laterality Date    COSMETIC SURGERY      EMBOLIZATION N/A 6/11/2023    Procedure: EMBOLIZATION, BLOOD VESSEL;  Surgeon: Debbie Surgeon;  Location: [facility];  Service: Anesthesiology; "  Laterality: N/A;    ESOPHAGOGASTRODUODENOSCOPY         Social History:   Tobacco Use    Smoking status: Never    Smokeless tobacco: Never   Substance and Sexual Activity    Alcohol use: Not Currently     Comment: hx of ETOH abuse with cirrhosis of liver    Drug use: No    Sexual activity: Not Currently       Family History:   Family History       Problem Relation (Age of Onset)    Alcohol abuse Father, Sister, Paternal Grandfather    Bipolar disorder Father    Hypertension Mother    Suicide Father            No current facility-administered medications on file prior to encounter.     Current Outpatient Medications on File Prior to Encounter   Medication Sig    furosemide (LASIX) 40 MG tablet Take 1 tablet (40 mg total) by mouth 2 (two) times daily.    lactulose (CHRONULAC) 20 gram/30 mL Soln 30 mLs (20 g total) by Per NG tube route 3 (three) times daily. Always give first dose in AM. Hold PM dose(s) if has had 3 BMs by them    levETIRAcetam (KEPPRA) 1000 MG tablet Take 1,000 mg by mouth 2 (two) times daily.    lithium carbonate 150 MG capsule Take 1 capsule (150 mg total) by mouth every evening.    propranoloL (INDERAL) 20 MG tablet Take 20 mg by mouth once daily.    bacitracin zinc 500 unit/gram OiPk Apply topically 3 (three) times daily. for 14 days    folic acid (FOLVITE) 1 MG tablet Take 1 tablet (1 mg total) by mouth once daily.    hydrOXYzine (ATARAX) 50 MG tablet Take 1 tablet (50 mg total) by mouth nightly as needed for Itching or Anxiety (or insomnia).    OLANZapine (ZYPREXA) 10 MG tablet Take 1 tablet (10 mg total) by mouth every evening.    rifAXIMin (XIFAXAN) 550 mg Tab Take 1 tablet (550 mg total) by mouth 2 (two) times daily.    spironolactone (ALDACTONE) 50 MG tablet Take 1 tablet (50 mg total) by mouth once daily.       Allergies:   Review of patient's allergies indicates:   Allergen Reactions    Sulfa (sulfonamide antibiotics) Rash    Codeine Nausea And Vomiting       ROS:  Review of Systems    Constitutional:  Negative for appetite change and fever.   Respiratory:  Negative for cough and shortness of breath.    Cardiovascular:  Negative for chest pain and palpitations.   Gastrointestinal:  Negative for abdominal pain, constipation, nausea and vomiting.   Genitourinary:  Negative for difficulty urinating and dysuria.   Musculoskeletal:  Negative for arthralgias and back pain.   Neurological:  Positive for tremors and weakness. Negative for dizziness and light-headedness.   Hematological:  Bruises/bleeds easily.   Psychiatric/Behavioral:  Positive for confusion. Negative for sleep disturbance. The patient is not nervous/anxious.        Objective:  Temp:  [99.5 °F (37.5 °C)-99.7 °F (37.6 °C)]   Pulse:  [72-82]   Resp:  [20]   BP: (112-120)/(55-59)   SpO2:  [91 %-92 %]     Body mass index is 18.02 kg/m².      Physical Exam  Vitals and nursing note reviewed.   Constitutional:       General: She is not in acute distress.     Appearance: She is well-developed. She is ill-appearing.      Interventions: Nasal cannula in place.   Cardiovascular:      Rate and Rhythm: Normal rate and regular rhythm.      Heart sounds: S1 normal and S2 normal. No murmur heard.  Pulmonary:      Effort: Pulmonary effort is normal. No respiratory distress.      Breath sounds: Normal breath sounds. No wheezing or rales.   Abdominal:      General: Bowel sounds are normal. There is no distension.      Palpations: Abdomen is soft.      Tenderness: There is no abdominal tenderness.   Musculoskeletal:         General: No tenderness.      Right lower leg: No edema.      Left lower leg: No edema.   Skin:     General: Skin is warm and dry.      Coloration: Skin is jaundiced.      Findings: Abrasion and bruising present.   Neurological:      Mental Status: She is alert.      Cranial Nerves: No dysarthria.      Motor: Weakness and tremor present.      Comments: Oriented to person, place and year.    Psychiatric:         Mood and Affect: Affect  is flat.         Behavior: Behavior is withdrawn. Behavior is cooperative.       Significant Labs:   A1C:   Recent Labs   Lab 05/29/23  0656   HGBA1C <4.0*     TSH:   Recent Labs   Lab 05/24/23  0802   TSH 0.582     CBC:  Recent Labs   Lab 07/02/23  0400   WBC 3.73*   HGB 8.5*   HCT 25.8*   PLT 77*   MCV 99*     BMP:  Recent Labs   Lab 07/02/23  0626   GLU 97      K 3.7   *   CO2 23   BUN 9   CREATININE 0.5   CALCIUM 8.8   MG 1.6     LFTs:  Recent Labs   Lab 07/02/23  0626   ALT 22   AST 43*   ALKPHOS 132   BILITOT 7.3*   PROT 6.2   ALBUMIN 2.8*       Significant Imaging: I have reviewed all pertinent imaging results/findings completed during prior hospitalization.      Assessment and Plan:  Active Diagnoses:    Diagnosis Date Noted POA    PRINCIPAL PROBLEM:  Non-convulsive status epilepticus [G40.901] 06/01/2023 Yes    Acute blood loss anemia [D62] 06/30/2023 Yes    Retroperitoneal bleed [R58] 06/11/2023 Yes    Acute deep vein thrombosis (DVT) of brachial vein of right upper extremity [I82.621] 06/09/2023 Yes    Deep vein thrombosis (DVT) of femoral vein of right lower extremity [I82.411] 06/09/2023 Yes    Acute liver failure with hepatic coma [K72.01] 06/03/2023 Yes    Acute metabolic encephalopathy [G93.41] 05/19/2023 Yes    Bipolar 1 disorder [F31.9] 06/09/2020 Yes    Alcoholic cirrhosis [K70.30] 04/27/2018 Yes    Hepatic encephalopathy [K76.82] 10/11/2015 Yes    Severe protein-calorie malnutrition [E43] 10/11/2015 Yes    Macrocytic anemia [D53.9] 09/18/2015 Yes    Alcohol abuse, in remission [F10.11] 06/15/2015 Yes    Thrombocytopenia [D69.6] 06/15/2015 Yes      Problems Resolved During this Admission:       Non-convulsive status epilepticus  Maintain seizure precautions  Continue Keppra 1 gram BID     Acute respiratory failure with hypoxia  Patient requiring 2 to 3 L NC to maintain SpO2 > 90% since SNF admit  RT consulted for oxygen monitoring, titration, and weaning of O2 per sat goal     Acute  metabolic encephalopathy  Multifactorial; r/t Hepatic encephalopathy & Non-convulsive status epilepticus  Proteus mirabilis UTI, resolved in hospital s/p 7D course rocephin   Maintain Delirium precautions     Hepatic encephalopathy  Persistent confusion - worsened from baseline per sister  Elevated ammonia on hospital admit, WNL by discharge.   Rifaximin po 550 mg BID  Lactulose po 20 g TID - titrate for > 3 BMs q day     Acute deep vein thrombosis (DVT) of brachial vein of right upper extremity  Deep vein thrombosis (DVT) of femoral vein of right lower extremity  Right IJ DVT  Argatroban transitioned to heparin then stopped d/t retroperitoneal hemorrhage and thrombocytopenia.   Now S/P IVC filter placed 6/14      Macrocytic anemia   Anemia of chronic disease      Recent H/O Acute blood loss anemia  Recent H/O Large Right retroperitoneal bleed  Recent H/O Hypovolemic shock  Received 9 units PRBCs, 11 platelet doses, 3 FFP, 2 cryoprecipitate in short term acute  S/P IR embolization of left lumbar and internal iliac branch 6/13  Previously on epoetin  Maintain bleeding precautions  Monitor Hgb with CBC Diff q T/Fri   Transfuse PRBC for Hgb < 7.0 and / or active bleeding     Thrombocytopenia  Chronic, due to liver failure  S/p 11 platelet doses, 3 FFP, 2 cryoprecipitate in short term acute  Bleeding precautions  Monitor with routinely scheduled CBC Diff  Transfuse for platelets <15 to <20K if pt develops fever, infection, or inflammation  Prophylactically transfuse for platelets < 10K to prevent spontaneous bleeding      Acute liver failure with hepatic coma  Ascites  R/T Alcohol abuse, in remission  Furosemide 40 mg BID hold for SBP <100 or DBP <60  Spironolactone 50 mg qhs  Thiamine 100 mg po daily  Refer patient to outpatient Hepatology at discharge or during SNF admit if indicated     Bipolar 1 disorder  As per Psych consult day of hospital discharge 6/30:  - Continue lithium 150mg qhs  - Decreased q HS Zyprexa  from 10 to 5 mg per psych recs  - Goal lithium level 0.6-1.2mEq/L.   - Checked lithium level Sunday 7/2 and was 0.1.    - Follow-up with outpt [facility], Dr. Coates, as early as 7/3.  -TelePsych has evaluated her.      Severe protein-calorie malnutrition  Body mass index is 18.02 kg/m².  Nutrition consulted.   TID nutritional supplements  MVI, Thiamine, Zinc po daily     Tremor RUE  Continue propanolol         IP [facility] RISK OF UNPLANNED READMISSION Model: High      Anticipated Disposition:  Home with home health      No future appointments.    I certify that SNF services are required to be given on an inpatient basis because Marely Hamilton needs for skilled nursing care and/or skilled rehabilitation are required on a daily basis and such services can only practically be provided in a skilled nursing facility setting and are for an ongoing condition for which she received inpatient care in the hospital.       Tim Quan MD  Department of Hospital Medicine   [facility] - Skilled Nursing

## 2023-07-04 NOTE — NURSING
Pt. refused,medications,pulse ox,,temp and ADL's.medications were offered multiple times by myself and charge nurse and pt. continued to refuse will continue to monitor.

## 2023-07-05 ENCOUNTER — ANESTHESIA EVENT (OUTPATIENT)
Dept: ENDOSCOPY | Facility: HOSPITAL | Age: 58
DRG: 356 | End: 2023-07-05
Payer: MEDICARE

## 2023-07-05 ENCOUNTER — ANESTHESIA (OUTPATIENT)
Dept: ENDOSCOPY | Facility: HOSPITAL | Age: 58
DRG: 356 | End: 2023-07-05
Payer: MEDICARE

## 2023-07-05 ENCOUNTER — HOSPITAL ENCOUNTER (INPATIENT)
Facility: HOSPITAL | Age: 58
LOS: 29 days | Discharge: NURSING, REHAB OR LONG-TERM CARE | DRG: 356 | End: 2023-08-03
Attending: EMERGENCY MEDICINE | Admitting: INTERNAL MEDICINE
Payer: MEDICARE

## 2023-07-05 VITALS
DIASTOLIC BLOOD PRESSURE: 50 MMHG | OXYGEN SATURATION: 95 % | BODY MASS INDEX: 18.14 KG/M2 | SYSTOLIC BLOOD PRESSURE: 107 MMHG | RESPIRATION RATE: 17 BRPM | HEIGHT: 62 IN | WEIGHT: 98.56 LBS | HEART RATE: 57 BPM | TEMPERATURE: 97 F

## 2023-07-05 DIAGNOSIS — K72.11 CHRONIC LIVER FAILURE WITH HEPATIC COMA: Primary | ICD-10-CM

## 2023-07-05 DIAGNOSIS — R94.31 QT PROLONGATION: ICD-10-CM

## 2023-07-05 DIAGNOSIS — R53.1 WEAKNESS: ICD-10-CM

## 2023-07-05 DIAGNOSIS — E87.5 HYPERKALEMIA: ICD-10-CM

## 2023-07-05 DIAGNOSIS — K26.4 GASTROINTESTINAL HEMORRHAGE ASSOCIATED WITH DUODENAL ULCER: ICD-10-CM

## 2023-07-05 DIAGNOSIS — R00.0 TACHYCARDIA: ICD-10-CM

## 2023-07-05 DIAGNOSIS — F31.10 BIPOLAR AFFECTIVE DISORDER, CURRENT EPISODE MANIC, CURRENT EPISODE SEVERITY UNSPECIFIED: ICD-10-CM

## 2023-07-05 DIAGNOSIS — K92.2 GI BLEEDING: ICD-10-CM

## 2023-07-05 DIAGNOSIS — Z87.898 HISTORY OF SEIZURE: ICD-10-CM

## 2023-07-05 DIAGNOSIS — Z78.9 INTUBATION OF AIRWAY PERFORMED WITHOUT DIFFICULTY: ICD-10-CM

## 2023-07-05 DIAGNOSIS — F31.9 BIPOLAR 1 DISORDER: ICD-10-CM

## 2023-07-05 DIAGNOSIS — R03.0 BLOOD PRESSURE ELEVATED WITHOUT HISTORY OF HTN: ICD-10-CM

## 2023-07-05 DIAGNOSIS — L24.A2 IRRITANT CONTACT DERMATITIS DUE TO FECAL, URINARY OR DUAL INCONTINENCE: ICD-10-CM

## 2023-07-05 DIAGNOSIS — I50.9 CHF (CONGESTIVE HEART FAILURE): ICD-10-CM

## 2023-07-05 DIAGNOSIS — K70.30 CIRRHOSIS, LAENNEC'S: ICD-10-CM

## 2023-07-05 DIAGNOSIS — K76.82 HEPATIC ENCEPHALOPATHY: ICD-10-CM

## 2023-07-05 PROBLEM — K92.1 HEMATOCHEZIA: Status: ACTIVE | Noted: 2023-07-05

## 2023-07-05 LAB
ABO + RH BLD: NORMAL
ABO + RH BLD: NORMAL
ALBUMIN SERPL BCP-MCNC: 1.4 G/DL (ref 3.5–5.2)
ALBUMIN SERPL BCP-MCNC: 1.7 G/DL (ref 3.5–5.2)
ALBUMIN SERPL BCP-MCNC: 2.2 G/DL (ref 3.5–5.2)
ALLENS TEST: ABNORMAL
ALP SERPL-CCNC: 39 U/L (ref 55–135)
ALP SERPL-CCNC: 57 U/L (ref 55–135)
ALP SERPL-CCNC: 90 U/L (ref 55–135)
ALT SERPL W/O P-5'-P-CCNC: 11 U/L (ref 10–44)
ALT SERPL W/O P-5'-P-CCNC: 12 U/L (ref 10–44)
ALT SERPL W/O P-5'-P-CCNC: 13 U/L (ref 10–44)
AMMONIA PLAS-SCNC: 190 UMOL/L (ref 10–50)
ANION GAP SERPL CALC-SCNC: 7 MMOL/L (ref 8–16)
ANION GAP SERPL CALC-SCNC: 9 MMOL/L (ref 8–16)
ANION GAP SERPL CALC-SCNC: 9 MMOL/L (ref 8–16)
ANISOCYTOSIS BLD QL SMEAR: SLIGHT
APTT PPP: 83 SEC (ref 21–32)
AST SERPL-CCNC: 33 U/L (ref 10–40)
AST SERPL-CCNC: 34 U/L (ref 10–40)
AST SERPL-CCNC: 36 U/L (ref 10–40)
BASO STIPL BLD QL SMEAR: ABNORMAL
BASO STIPL BLD QL SMEAR: ABNORMAL
BASOPHILS # BLD AUTO: 0.01 K/UL (ref 0–0.2)
BASOPHILS # BLD AUTO: 0.02 K/UL (ref 0–0.2)
BASOPHILS # BLD AUTO: 0.04 K/UL (ref 0–0.2)
BASOPHILS NFR BLD: 0.1 % (ref 0–1.9)
BASOPHILS NFR BLD: 0.2 % (ref 0–1.9)
BASOPHILS NFR BLD: 0.4 % (ref 0–1.9)
BILIRUB SERPL-MCNC: 3.2 MG/DL (ref 0.1–1)
BILIRUB SERPL-MCNC: 4 MG/DL (ref 0.1–1)
BILIRUB SERPL-MCNC: 6.2 MG/DL (ref 0.1–1)
BLD GP AB SCN CELLS X3 SERPL QL: NORMAL
BLD GP AB SCN CELLS X3 SERPL QL: NORMAL
BLD PROD TYP BPU: NORMAL
BLOOD UNIT EXPIRATION DATE: NORMAL
BLOOD UNIT TYPE CODE: 5100
BLOOD UNIT TYPE CODE: 6200
BLOOD UNIT TYPE: NORMAL
BUN SERPL-MCNC: 28 MG/DL (ref 6–20)
BUN SERPL-MCNC: 30 MG/DL (ref 6–20)
BUN SERPL-MCNC: 34 MG/DL (ref 6–20)
BURR CELLS BLD QL SMEAR: ABNORMAL
BURR CELLS BLD QL SMEAR: ABNORMAL
CA-I BLDV-SCNC: 1.06 MMOL/L (ref 1.06–1.42)
CALCIUM SERPL-MCNC: 7.1 MG/DL (ref 8.7–10.5)
CALCIUM SERPL-MCNC: 7.4 MG/DL (ref 8.7–10.5)
CALCIUM SERPL-MCNC: 8.5 MG/DL (ref 8.7–10.5)
CHLORIDE SERPL-SCNC: 109 MMOL/L (ref 95–110)
CHLORIDE SERPL-SCNC: 114 MMOL/L (ref 95–110)
CHLORIDE SERPL-SCNC: 114 MMOL/L (ref 95–110)
CO2 SERPL-SCNC: 17 MMOL/L (ref 23–29)
CO2 SERPL-SCNC: 19 MMOL/L (ref 23–29)
CO2 SERPL-SCNC: 21 MMOL/L (ref 23–29)
CODING SYSTEM: NORMAL
CREAT SERPL-MCNC: 0.5 MG/DL (ref 0.5–1.4)
CREAT SERPL-MCNC: 0.5 MG/DL (ref 0.5–1.4)
CREAT SERPL-MCNC: 0.6 MG/DL (ref 0.5–1.4)
CROSSMATCH INTERPRETATION: NORMAL
DACRYOCYTES BLD QL SMEAR: ABNORMAL
DELSYS: ABNORMAL
DIFFERENTIAL METHOD: ABNORMAL
DISPENSE STATUS: NORMAL
DOHLE BOD BLD QL SMEAR: PRESENT
EOSINOPHIL # BLD AUTO: 0 K/UL (ref 0–0.5)
EOSINOPHIL # BLD AUTO: 0.1 K/UL (ref 0–0.5)
EOSINOPHIL NFR BLD: 0 % (ref 0–8)
EOSINOPHIL NFR BLD: 0.1 % (ref 0–8)
EOSINOPHIL NFR BLD: 0.2 % (ref 0–8)
EOSINOPHIL NFR BLD: 0.4 % (ref 0–8)
EOSINOPHIL NFR BLD: 1.1 % (ref 0–8)
ERYTHROCYTE [DISTWIDTH] IN BLOOD BY AUTOMATED COUNT: 14.6 % (ref 11.5–14.5)
ERYTHROCYTE [DISTWIDTH] IN BLOOD BY AUTOMATED COUNT: 14.9 % (ref 11.5–14.5)
ERYTHROCYTE [DISTWIDTH] IN BLOOD BY AUTOMATED COUNT: 17.8 % (ref 11.5–14.5)
ERYTHROCYTE [DISTWIDTH] IN BLOOD BY AUTOMATED COUNT: 18.2 % (ref 11.5–14.5)
ERYTHROCYTE [DISTWIDTH] IN BLOOD BY AUTOMATED COUNT: ABNORMAL % (ref 11.5–14.5)
ERYTHROCYTE [SEDIMENTATION RATE] IN BLOOD BY WESTERGREN METHOD: 18 MM/H
ERYTHROCYTE [SEDIMENTATION RATE] IN BLOOD BY WESTERGREN METHOD: 30 MM/H
EST. GFR  (NO RACE VARIABLE): >60 ML/MIN/1.73 M^2
FIBRINOGEN PPP-MCNC: 90 MG/DL (ref 182–400)
FIBRINOGEN PPP-MCNC: <70 MG/DL (ref 182–400)
FIO2: 100
FIO2: 100
FIO2: 50
GLUCOSE SERPL-MCNC: 116 MG/DL (ref 70–110)
GLUCOSE SERPL-MCNC: 251 MG/DL (ref 70–110)
GLUCOSE SERPL-MCNC: 295 MG/DL (ref 70–110)
HCO3 UR-SCNC: 15.6 MMOL/L (ref 24–28)
HCO3 UR-SCNC: 17.2 MMOL/L (ref 24–28)
HCO3 UR-SCNC: 19.1 MMOL/L (ref 24–28)
HCT VFR BLD AUTO: 12.3 % (ref 37–48.5)
HCT VFR BLD AUTO: 15 % (ref 37–48.5)
HCT VFR BLD AUTO: 21.4 % (ref 37–48.5)
HCT VFR BLD AUTO: 22.8 % (ref 37–48.5)
HCT VFR BLD AUTO: 25.7 % (ref 37–48.5)
HGB BLD-MCNC: 3.9 G/DL (ref 12–16)
HGB BLD-MCNC: 4.6 G/DL (ref 12–16)
HGB BLD-MCNC: 6.8 G/DL (ref 12–16)
HGB BLD-MCNC: 7.2 G/DL (ref 12–16)
HGB BLD-MCNC: 8.6 G/DL (ref 12–16)
HYPOCHROMIA BLD QL SMEAR: ABNORMAL
HYPOCHROMIA BLD QL SMEAR: ABNORMAL
IMM GRANULOCYTES # BLD AUTO: 0.07 K/UL (ref 0–0.04)
IMM GRANULOCYTES # BLD AUTO: 0.07 K/UL (ref 0–0.04)
IMM GRANULOCYTES # BLD AUTO: 0.1 K/UL (ref 0–0.04)
IMM GRANULOCYTES # BLD AUTO: 0.24 K/UL (ref 0–0.04)
IMM GRANULOCYTES # BLD AUTO: 0.33 K/UL (ref 0–0.04)
IMM GRANULOCYTES NFR BLD AUTO: 0.7 % (ref 0–0.5)
IMM GRANULOCYTES NFR BLD AUTO: 0.8 % (ref 0–0.5)
IMM GRANULOCYTES NFR BLD AUTO: 1.2 % (ref 0–0.5)
IMM GRANULOCYTES NFR BLD AUTO: 2 % (ref 0–0.5)
IMM GRANULOCYTES NFR BLD AUTO: 2.9 % (ref 0–0.5)
INR PPP: 2 (ref 0.8–1.2)
INR PPP: 2.5 (ref 0.8–1.2)
LACTATE SERPL-SCNC: 8 MMOL/L (ref 0.5–2.2)
LDH SERPL L TO P-CCNC: 6.11 MMOL/L (ref 0.36–1.25)
LDH SERPL L TO P-CCNC: 7.86 MMOL/L (ref 0.36–1.25)
LITHIUM SERPL-SCNC: 0.2 MMOL/L (ref 0.6–1.2)
LYMPHOCYTES # BLD AUTO: 0.9 K/UL (ref 1–4.8)
LYMPHOCYTES # BLD AUTO: 1.1 K/UL (ref 1–4.8)
LYMPHOCYTES # BLD AUTO: 1.6 K/UL (ref 1–4.8)
LYMPHOCYTES # BLD AUTO: 1.7 K/UL (ref 1–4.8)
LYMPHOCYTES # BLD AUTO: 3.1 K/UL (ref 1–4.8)
LYMPHOCYTES NFR BLD: 12.8 % (ref 18–48)
LYMPHOCYTES NFR BLD: 14.1 % (ref 18–48)
LYMPHOCYTES NFR BLD: 14.2 % (ref 18–48)
LYMPHOCYTES NFR BLD: 34 % (ref 18–48)
LYMPHOCYTES NFR BLD: 9.5 % (ref 18–48)
MAGNESIUM SERPL-MCNC: 1.2 MG/DL (ref 1.6–2.6)
MAGNESIUM SERPL-MCNC: 1.4 MG/DL (ref 1.6–2.6)
MCH RBC QN AUTO: 30.3 PG (ref 27–31)
MCH RBC QN AUTO: 30.6 PG (ref 27–31)
MCH RBC QN AUTO: 30.7 PG (ref 27–31)
MCH RBC QN AUTO: 31.2 PG (ref 27–31)
MCH RBC QN AUTO: 33.3 PG (ref 27–31)
MCHC RBC AUTO-ENTMCNC: 29.8 G/DL (ref 32–36)
MCHC RBC AUTO-ENTMCNC: 30.7 G/DL (ref 32–36)
MCHC RBC AUTO-ENTMCNC: 31.7 G/DL (ref 32–36)
MCHC RBC AUTO-ENTMCNC: 33.5 G/DL (ref 32–36)
MCHC RBC AUTO-ENTMCNC: 33.6 G/DL (ref 32–36)
MCV RBC AUTO: 100 FL (ref 82–98)
MCV RBC AUTO: 112 FL (ref 82–98)
MCV RBC AUTO: 91 FL (ref 82–98)
MCV RBC AUTO: 91 FL (ref 82–98)
MCV RBC AUTO: 98 FL (ref 82–98)
MIN VOL: 9
MODE: ABNORMAL
MONOCYTES # BLD AUTO: 0.7 K/UL (ref 0.3–1)
MONOCYTES # BLD AUTO: 0.8 K/UL (ref 0.3–1)
MONOCYTES # BLD AUTO: 1 K/UL (ref 0.3–1)
MONOCYTES # BLD AUTO: 1.1 K/UL (ref 0.3–1)
MONOCYTES # BLD AUTO: 1.8 K/UL (ref 0.3–1)
MONOCYTES NFR BLD: 11.2 % (ref 4–15)
MONOCYTES NFR BLD: 14.4 % (ref 4–15)
MONOCYTES NFR BLD: 8.7 % (ref 4–15)
MONOCYTES NFR BLD: 8.8 % (ref 4–15)
MONOCYTES NFR BLD: 9.4 % (ref 4–15)
NEUTROPHILS # BLD AUTO: 4.7 K/UL (ref 1.8–7.7)
NEUTROPHILS # BLD AUTO: 6.5 K/UL (ref 1.8–7.7)
NEUTROPHILS # BLD AUTO: 7.6 K/UL (ref 1.8–7.7)
NEUTROPHILS # BLD AUTO: 8.3 K/UL (ref 1.8–7.7)
NEUTROPHILS # BLD AUTO: 8.4 K/UL (ref 1.8–7.7)
NEUTROPHILS NFR BLD: 52.5 % (ref 38–73)
NEUTROPHILS NFR BLD: 69.4 % (ref 38–73)
NEUTROPHILS NFR BLD: 73 % (ref 38–73)
NEUTROPHILS NFR BLD: 76.9 % (ref 38–73)
NEUTROPHILS NFR BLD: 80.8 % (ref 38–73)
NRBC BLD-RTO: 0 /100 WBC
NUM UNITS TRANS PACKED RBC: NORMAL
OVALOCYTES BLD QL SMEAR: ABNORMAL
OVALOCYTES BLD QL SMEAR: ABNORMAL
PCO2 BLDA: 27.6 MMHG (ref 35–45)
PCO2 BLDA: 41.4 MMHG (ref 35–45)
PCO2 BLDA: 50.6 MMHG (ref 35–45)
PEEP: 5
PH SMN: 7.1 [PH] (ref 7.35–7.45)
PH SMN: 7.27 [PH] (ref 7.35–7.45)
PH SMN: 7.4 [PH] (ref 7.35–7.45)
PHOSPHATE SERPL-MCNC: 2.8 MG/DL (ref 2.7–4.5)
PHOSPHATE SERPL-MCNC: 3.7 MG/DL (ref 2.7–4.5)
PIP: 23
PLATELET # BLD AUTO: 29 K/UL (ref 150–450)
PLATELET # BLD AUTO: 42 K/UL (ref 150–450)
PLATELET # BLD AUTO: 49 K/UL (ref 150–450)
PLATELET # BLD AUTO: 57 K/UL (ref 150–450)
PLATELET # BLD AUTO: 93 K/UL (ref 150–450)
PLATELET BLD QL SMEAR: ABNORMAL
PMV BLD AUTO: 10 FL (ref 9.2–12.9)
PMV BLD AUTO: 10.7 FL (ref 9.2–12.9)
PMV BLD AUTO: 11 FL (ref 9.2–12.9)
PMV BLD AUTO: 8.7 FL (ref 9.2–12.9)
PMV BLD AUTO: 9.7 FL (ref 9.2–12.9)
PO2 BLDA: 153 MMHG (ref 80–100)
PO2 BLDA: 44 MMHG (ref 40–60)
PO2 BLDA: 84 MMHG (ref 40–60)
POC BE: -14 MMOL/L
POC BE: -8 MMOL/L
POC BE: -8 MMOL/L
POC SATURATED O2: 74 % (ref 95–100)
POC SATURATED O2: 91 % (ref 95–100)
POC SATURATED O2: 99 % (ref 95–100)
POC TCO2: 17 MMOL/L (ref 24–29)
POC TCO2: 18 MMOL/L (ref 23–27)
POC TCO2: 20 MMOL/L (ref 24–29)
POCT GLUCOSE: 270 MG/DL (ref 70–110)
POCT GLUCOSE: 273 MG/DL (ref 70–110)
POCT GLUCOSE: 293 MG/DL (ref 70–110)
POCT GLUCOSE: 304 MG/DL (ref 70–110)
POCT GLUCOSE: 333 MG/DL (ref 70–110)
POIKILOCYTOSIS BLD QL SMEAR: SLIGHT
POLYCHROMASIA BLD QL SMEAR: ABNORMAL
POTASSIUM SERPL-SCNC: 4.8 MMOL/L (ref 3.5–5.1)
POTASSIUM SERPL-SCNC: 4.9 MMOL/L (ref 3.5–5.1)
POTASSIUM SERPL-SCNC: 6.6 MMOL/L (ref 3.5–5.1)
PROT SERPL-MCNC: 2.8 G/DL (ref 6–8.4)
PROT SERPL-MCNC: 3.1 G/DL (ref 6–8.4)
PROT SERPL-MCNC: 5.1 G/DL (ref 6–8.4)
PROTHROMBIN TIME: 20.4 SEC (ref 9–12.5)
PROTHROMBIN TIME: 25.1 SEC (ref 9–12.5)
RBC # BLD AUTO: 1.25 M/UL (ref 4–5.4)
RBC # BLD AUTO: 1.5 M/UL (ref 4–5.4)
RBC # BLD AUTO: 2.04 M/UL (ref 4–5.4)
RBC # BLD AUTO: 2.35 M/UL (ref 4–5.4)
RBC # BLD AUTO: 2.84 M/UL (ref 4–5.4)
SAMPLE: ABNORMAL
SCHISTOCYTES BLD QL SMEAR: ABNORMAL
SITE: ABNORMAL
SODIUM SERPL-SCNC: 138 MMOL/L (ref 136–145)
SODIUM SERPL-SCNC: 139 MMOL/L (ref 136–145)
SODIUM SERPL-SCNC: 142 MMOL/L (ref 136–145)
SP02: 100
SP02: 100
SPECIMEN OUTDATE: NORMAL
SPECIMEN OUTDATE: NORMAL
TOXIC GRANULES BLD QL SMEAR: PRESENT
TRANS ERYTHROCYTES VOL PATIENT: NORMAL ML
UNIT NUMBER: NORMAL
VT: 300
WBC # BLD AUTO: 11.41 K/UL (ref 3.9–12.7)
WBC # BLD AUTO: 12.15 K/UL (ref 3.9–12.7)
WBC # BLD AUTO: 8.41 K/UL (ref 3.9–12.7)
WBC # BLD AUTO: 9.01 K/UL (ref 3.9–12.7)
WBC # BLD AUTO: 9.35 K/UL (ref 3.9–12.7)

## 2023-07-05 PROCEDURE — P9017 PLASMA 1 DONOR FRZ W/IN 8 HR: HCPCS | Performed by: EMERGENCY MEDICINE

## 2023-07-05 PROCEDURE — P9011 BLOOD SPLIT UNIT: HCPCS | Performed by: EMERGENCY MEDICINE

## 2023-07-05 PROCEDURE — 85610 PROTHROMBIN TIME: CPT | Mod: 91 | Performed by: EMERGENCY MEDICINE

## 2023-07-05 PROCEDURE — 85014 HEMATOCRIT: CPT

## 2023-07-05 PROCEDURE — 80178 ASSAY OF LITHIUM: CPT | Performed by: EMERGENCY MEDICINE

## 2023-07-05 PROCEDURE — 25000003 PHARM REV CODE 250

## 2023-07-05 PROCEDURE — 27000221 HC OXYGEN, UP TO 24 HOURS

## 2023-07-05 PROCEDURE — C9113 INJ PANTOPRAZOLE SODIUM, VIA: HCPCS | Performed by: EMERGENCY MEDICINE

## 2023-07-05 PROCEDURE — 86900 BLOOD TYPING SEROLOGIC ABO: CPT | Mod: 91 | Performed by: STUDENT IN AN ORGANIZED HEALTH CARE EDUCATION/TRAINING PROGRAM

## 2023-07-05 PROCEDURE — 63600175 PHARM REV CODE 636 W HCPCS: Performed by: STUDENT IN AN ORGANIZED HEALTH CARE EDUCATION/TRAINING PROGRAM

## 2023-07-05 PROCEDURE — 85025 COMPLETE CBC W/AUTO DIFF WBC: CPT | Mod: 91

## 2023-07-05 PROCEDURE — 84132 ASSAY OF SERUM POTASSIUM: CPT

## 2023-07-05 PROCEDURE — 96374 THER/PROPH/DIAG INJ IV PUSH: CPT

## 2023-07-05 PROCEDURE — 99222 1ST HOSP IP/OBS MODERATE 55: CPT | Mod: ,,, | Performed by: INTERNAL MEDICINE

## 2023-07-05 PROCEDURE — 25000003 PHARM REV CODE 250: Performed by: STUDENT IN AN ORGANIZED HEALTH CARE EDUCATION/TRAINING PROGRAM

## 2023-07-05 PROCEDURE — 96375 TX/PRO/DX INJ NEW DRUG ADDON: CPT

## 2023-07-05 PROCEDURE — 85730 THROMBOPLASTIN TIME PARTIAL: CPT | Performed by: INTERNAL MEDICINE

## 2023-07-05 PROCEDURE — P9017 PLASMA 1 DONOR FRZ W/IN 8 HR: HCPCS | Performed by: STUDENT IN AN ORGANIZED HEALTH CARE EDUCATION/TRAINING PROGRAM

## 2023-07-05 PROCEDURE — 99222 PR INITIAL HOSPITAL CARE,LEVL II: ICD-10-PCS | Mod: ,,, | Performed by: INTERNAL MEDICINE

## 2023-07-05 PROCEDURE — 99900035 HC TECH TIME PER 15 MIN (STAT)

## 2023-07-05 PROCEDURE — 82803 BLOOD GASES ANY COMBINATION: CPT

## 2023-07-05 PROCEDURE — 25000003 PHARM REV CODE 250: Performed by: EMERGENCY MEDICINE

## 2023-07-05 PROCEDURE — 85384 FIBRINOGEN ACTIVITY: CPT | Performed by: INTERNAL MEDICINE

## 2023-07-05 PROCEDURE — 25500020 PHARM REV CODE 255: Performed by: INTERNAL MEDICINE

## 2023-07-05 PROCEDURE — 20000000 HC ICU ROOM

## 2023-07-05 PROCEDURE — 25000003 PHARM REV CODE 250: Performed by: FAMILY MEDICINE

## 2023-07-05 PROCEDURE — 84295 ASSAY OF SERUM SODIUM: CPT

## 2023-07-05 PROCEDURE — 93010 ELECTROCARDIOGRAM REPORT: CPT | Mod: ,,, | Performed by: INTERNAL MEDICINE

## 2023-07-05 PROCEDURE — 25000003 PHARM REV CODE 250: Performed by: INTERNAL MEDICINE

## 2023-07-05 PROCEDURE — 80053 COMPREHEN METABOLIC PANEL: CPT | Performed by: INTERNAL MEDICINE

## 2023-07-05 PROCEDURE — 27000492 HC SLEEVE, SCD T/L

## 2023-07-05 PROCEDURE — 93005 ELECTROCARDIOGRAM TRACING: CPT

## 2023-07-05 PROCEDURE — 86920 COMPATIBILITY TEST SPIN: CPT

## 2023-07-05 PROCEDURE — 86900 BLOOD TYPING SEROLOGIC ABO: CPT | Performed by: EMERGENCY MEDICINE

## 2023-07-05 PROCEDURE — 93010 EKG 12-LEAD: ICD-10-PCS | Mod: ,,, | Performed by: INTERNAL MEDICINE

## 2023-07-05 PROCEDURE — 86920 COMPATIBILITY TEST SPIN: CPT | Performed by: EMERGENCY MEDICINE

## 2023-07-05 PROCEDURE — 96361 HYDRATE IV INFUSION ADD-ON: CPT

## 2023-07-05 PROCEDURE — 83605 ASSAY OF LACTIC ACID: CPT | Performed by: STUDENT IN AN ORGANIZED HEALTH CARE EDUCATION/TRAINING PROGRAM

## 2023-07-05 PROCEDURE — 85025 COMPLETE CBC W/AUTO DIFF WBC: CPT | Mod: 91 | Performed by: STUDENT IN AN ORGANIZED HEALTH CARE EDUCATION/TRAINING PROGRAM

## 2023-07-05 PROCEDURE — 83735 ASSAY OF MAGNESIUM: CPT | Performed by: INTERNAL MEDICINE

## 2023-07-05 PROCEDURE — 86985 SPLIT BLOOD OR PRODUCTS: CPT | Performed by: EMERGENCY MEDICINE

## 2023-07-05 PROCEDURE — P9035 PLATELET PHERES LEUKOREDUCED: HCPCS | Performed by: NURSE PRACTITIONER

## 2023-07-05 PROCEDURE — 99285 EMERGENCY DEPT VISIT HI MDM: CPT | Mod: 25

## 2023-07-05 PROCEDURE — 85025 COMPLETE CBC W/AUTO DIFF WBC: CPT | Performed by: INTERNAL MEDICINE

## 2023-07-05 PROCEDURE — P9021 RED BLOOD CELLS UNIT: HCPCS | Performed by: NURSE PRACTITIONER

## 2023-07-05 PROCEDURE — 82330 ASSAY OF CALCIUM: CPT

## 2023-07-05 PROCEDURE — P9021 RED BLOOD CELLS UNIT: HCPCS | Performed by: STUDENT IN AN ORGANIZED HEALTH CARE EDUCATION/TRAINING PROGRAM

## 2023-07-05 PROCEDURE — 80053 COMPREHEN METABOLIC PANEL: CPT | Mod: 91 | Performed by: EMERGENCY MEDICINE

## 2023-07-05 PROCEDURE — 99900026 HC AIRWAY MAINTENANCE (STAT)

## 2023-07-05 PROCEDURE — 86920 COMPATIBILITY TEST SPIN: CPT | Performed by: STUDENT IN AN ORGANIZED HEALTH CARE EDUCATION/TRAINING PROGRAM

## 2023-07-05 PROCEDURE — 80053 COMPREHEN METABOLIC PANEL: CPT | Mod: 91

## 2023-07-05 PROCEDURE — 36620 INSERTION CATHETER ARTERY: CPT

## 2023-07-05 PROCEDURE — 84100 ASSAY OF PHOSPHORUS: CPT | Mod: 91

## 2023-07-05 PROCEDURE — 85610 PROTHROMBIN TIME: CPT | Performed by: INTERNAL MEDICINE

## 2023-07-05 PROCEDURE — 86920 COMPATIBILITY TEST SPIN: CPT | Performed by: NURSE PRACTITIONER

## 2023-07-05 PROCEDURE — 83605 ASSAY OF LACTIC ACID: CPT

## 2023-07-05 PROCEDURE — P9021 RED BLOOD CELLS UNIT: HCPCS | Performed by: EMERGENCY MEDICINE

## 2023-07-05 PROCEDURE — 96365 THER/PROPH/DIAG IV INF INIT: CPT | Mod: 59

## 2023-07-05 PROCEDURE — 63600175 PHARM REV CODE 636 W HCPCS: Performed by: INTERNAL MEDICINE

## 2023-07-05 PROCEDURE — 18500000 HC LEAVE OF ABSENCE HOSPITAL SERVICES

## 2023-07-05 PROCEDURE — 84100 ASSAY OF PHOSPHORUS: CPT | Performed by: INTERNAL MEDICINE

## 2023-07-05 PROCEDURE — 63600175 PHARM REV CODE 636 W HCPCS: Mod: JA | Performed by: EMERGENCY MEDICINE

## 2023-07-05 PROCEDURE — 85025 COMPLETE CBC W/AUTO DIFF WBC: CPT | Mod: 91 | Performed by: EMERGENCY MEDICINE

## 2023-07-05 PROCEDURE — 25000003 PHARM REV CODE 250: Performed by: HOSPITALIST

## 2023-07-05 PROCEDURE — 94761 N-INVAS EAR/PLS OXIMETRY MLT: CPT

## 2023-07-05 PROCEDURE — 94002 VENT MGMT INPAT INIT DAY: CPT

## 2023-07-05 PROCEDURE — 82140 ASSAY OF AMMONIA: CPT | Performed by: EMERGENCY MEDICINE

## 2023-07-05 PROCEDURE — 63600175 PHARM REV CODE 636 W HCPCS: Performed by: EMERGENCY MEDICINE

## 2023-07-05 PROCEDURE — 85002 BLEEDING TIME TEST: CPT

## 2023-07-05 PROCEDURE — 85384 FIBRINOGEN ACTIVITY: CPT | Mod: 91 | Performed by: EMERGENCY MEDICINE

## 2023-07-05 PROCEDURE — 83735 ASSAY OF MAGNESIUM: CPT | Mod: 91

## 2023-07-05 PROCEDURE — 37799 UNLISTED PX VASCULAR SURGERY: CPT

## 2023-07-05 PROCEDURE — C9113 INJ PANTOPRAZOLE SODIUM, VIA: HCPCS | Performed by: STUDENT IN AN ORGANIZED HEALTH CARE EDUCATION/TRAINING PROGRAM

## 2023-07-05 RX ORDER — PHENYLEPHRINE HCL IN 0.9% NACL 1 MG/10 ML
SYRINGE (ML) INTRAVENOUS
Status: DISPENSED
Start: 2023-07-05 | End: 2023-07-05

## 2023-07-05 RX ORDER — TRANEXAMIC ACID 10 MG/ML
1000 INJECTION, SOLUTION INTRAVENOUS ONCE
Status: COMPLETED | OUTPATIENT
Start: 2023-07-05 | End: 2023-07-05

## 2023-07-05 RX ORDER — SODIUM CHLORIDE 0.9 % (FLUSH) 0.9 %
10 SYRINGE (ML) INJECTION
Status: DISCONTINUED | OUTPATIENT
Start: 2023-07-05 | End: 2023-08-04 | Stop reason: HOSPADM

## 2023-07-05 RX ORDER — CHLORHEXIDINE GLUCONATE ORAL RINSE 1.2 MG/ML
15 SOLUTION DENTAL 2 TIMES DAILY
Status: DISCONTINUED | OUTPATIENT
Start: 2023-07-05 | End: 2023-07-06

## 2023-07-05 RX ORDER — PHENYLEPHRINE HCL IN 0.9% NACL 1 MG/10 ML
SYRINGE (ML) INTRAVENOUS
Status: DISPENSED
Start: 2023-07-05 | End: 2023-07-06

## 2023-07-05 RX ORDER — PROPOFOL 10 MG/ML
0-50 INJECTION, EMULSION INTRAVENOUS CONTINUOUS
Status: DISCONTINUED | OUTPATIENT
Start: 2023-07-05 | End: 2023-07-09

## 2023-07-05 RX ORDER — MAGNESIUM SULFATE HEPTAHYDRATE 40 MG/ML
2 INJECTION, SOLUTION INTRAVENOUS ONCE
Status: COMPLETED | OUTPATIENT
Start: 2023-07-06 | End: 2023-07-06

## 2023-07-05 RX ORDER — DEXTROSE MONOHYDRATE AND SODIUM CHLORIDE 5; .45 G/100ML; G/100ML
INJECTION, SOLUTION INTRAVENOUS CONTINUOUS PRN
Status: DISCONTINUED | OUTPATIENT
Start: 2023-07-05 | End: 2023-07-16

## 2023-07-05 RX ORDER — GLUCAGON 1 MG
1 KIT INJECTION
Status: DISCONTINUED | OUTPATIENT
Start: 2023-07-05 | End: 2023-08-04 | Stop reason: HOSPADM

## 2023-07-05 RX ORDER — PROPOFOL 10 MG/ML
20 INJECTION, EMULSION INTRAVENOUS
Status: COMPLETED | OUTPATIENT
Start: 2023-07-05 | End: 2023-07-05

## 2023-07-05 RX ORDER — HYDROCODONE BITARTRATE AND ACETAMINOPHEN 500; 5 MG/1; MG/1
TABLET ORAL
Status: DISCONTINUED | OUTPATIENT
Start: 2023-07-05 | End: 2023-07-06

## 2023-07-05 RX ORDER — ETOMIDATE 2 MG/ML
15 INJECTION INTRAVENOUS
Status: COMPLETED | OUTPATIENT
Start: 2023-07-05 | End: 2023-07-05

## 2023-07-05 RX ORDER — FENTANYL CITRATE-0.9 % NACL/PF 10 MCG/ML
0-250 PLASTIC BAG, INJECTION (ML) INTRAVENOUS CONTINUOUS
Status: DISCONTINUED | OUTPATIENT
Start: 2023-07-05 | End: 2023-07-05

## 2023-07-05 RX ORDER — ROCURONIUM BROMIDE 10 MG/ML
1 INJECTION, SOLUTION INTRAVENOUS ONCE
Status: COMPLETED | OUTPATIENT
Start: 2023-07-05 | End: 2023-07-05

## 2023-07-05 RX ORDER — MAG HYDROX/ALUMINUM HYD/SIMETH 200-200-20
30 SUSPENSION, ORAL (FINAL DOSE FORM) ORAL
Status: DISCONTINUED | OUTPATIENT
Start: 2023-07-05 | End: 2023-07-05

## 2023-07-05 RX ORDER — SUCRALFATE 1 G/10ML
1 SUSPENSION ORAL EVERY 6 HOURS
Status: DISCONTINUED | OUTPATIENT
Start: 2023-07-05 | End: 2023-07-05

## 2023-07-05 RX ORDER — INDOMETHACIN 25 MG/1
CAPSULE ORAL
Status: COMPLETED
Start: 2023-07-05 | End: 2023-07-05

## 2023-07-05 RX ORDER — PANTOPRAZOLE SODIUM 40 MG/10ML
80 INJECTION, POWDER, LYOPHILIZED, FOR SOLUTION INTRAVENOUS
Status: COMPLETED | OUTPATIENT
Start: 2023-07-05 | End: 2023-07-05

## 2023-07-05 RX ORDER — INDOMETHACIN 25 MG/1
50 CAPSULE ORAL
Status: COMPLETED | OUTPATIENT
Start: 2023-07-05 | End: 2023-07-05

## 2023-07-05 RX ORDER — INSULIN ASPART 100 [IU]/ML
0-5 INJECTION, SOLUTION INTRAVENOUS; SUBCUTANEOUS EVERY 6 HOURS PRN
Status: DISCONTINUED | OUTPATIENT
Start: 2023-07-05 | End: 2023-07-05

## 2023-07-05 RX ORDER — INSULIN ASPART 100 [IU]/ML
1-10 INJECTION, SOLUTION INTRAVENOUS; SUBCUTANEOUS EVERY 6 HOURS PRN
Status: DISCONTINUED | OUTPATIENT
Start: 2023-07-05 | End: 2023-08-04 | Stop reason: HOSPADM

## 2023-07-05 RX ORDER — HYDROMORPHONE HYDROCHLORIDE 1 MG/ML
1 INJECTION, SOLUTION INTRAMUSCULAR; INTRAVENOUS; SUBCUTANEOUS ONCE
Status: DISCONTINUED | OUTPATIENT
Start: 2023-07-05 | End: 2023-07-06

## 2023-07-05 RX ORDER — NOREPINEPHRINE BITARTRATE/D5W 4MG/250ML
0-3 PLASTIC BAG, INJECTION (ML) INTRAVENOUS CONTINUOUS
Status: DISCONTINUED | OUTPATIENT
Start: 2023-07-05 | End: 2023-07-12

## 2023-07-05 RX ORDER — SUCCINYLCHOLINE CHLORIDE 20 MG/ML
120 INJECTION INTRAMUSCULAR; INTRAVENOUS
Status: DISCONTINUED | OUTPATIENT
Start: 2023-07-05 | End: 2023-07-05

## 2023-07-05 RX ORDER — SODIUM BICARBONATE 1 MEQ/ML
50 SYRINGE (ML) INTRAVENOUS
Status: COMPLETED | OUTPATIENT
Start: 2023-07-05 | End: 2023-07-05

## 2023-07-05 RX ORDER — PHENYLEPHRINE HCL IN 0.9% NACL 1 MG/10 ML
100 SYRINGE (ML) INTRAVENOUS ONCE
Status: COMPLETED | OUTPATIENT
Start: 2023-07-05 | End: 2023-07-05

## 2023-07-05 RX ORDER — VASOPRESSIN 20 [USP'U]/ML
INJECTION, SOLUTION INTRAMUSCULAR; SUBCUTANEOUS
Status: COMPLETED
Start: 2023-07-05 | End: 2023-07-05

## 2023-07-05 RX ORDER — OCTREOTIDE ACETATE 100 UG/ML
100 INJECTION, SOLUTION INTRAVENOUS; SUBCUTANEOUS
Status: COMPLETED | OUTPATIENT
Start: 2023-07-05 | End: 2023-07-05

## 2023-07-05 RX ORDER — CALCIUM GLUCONATE 20 MG/ML
1 INJECTION, SOLUTION INTRAVENOUS
Status: COMPLETED | OUTPATIENT
Start: 2023-07-05 | End: 2023-07-05

## 2023-07-05 RX ORDER — PANTOPRAZOLE SODIUM 40 MG/10ML
40 INJECTION, POWDER, LYOPHILIZED, FOR SOLUTION INTRAVENOUS 2 TIMES DAILY
Status: DISCONTINUED | OUTPATIENT
Start: 2023-07-05 | End: 2023-07-27

## 2023-07-05 RX ORDER — PANTOPRAZOLE SODIUM 40 MG/1
40 TABLET, DELAYED RELEASE ORAL DAILY
Status: DISCONTINUED | OUTPATIENT
Start: 2023-07-05 | End: 2023-07-05

## 2023-07-05 RX ORDER — MAG HYDROX/ALUMINUM HYD/SIMETH 200-200-20
30 SUSPENSION, ORAL (FINAL DOSE FORM) ORAL
Status: DISCONTINUED | OUTPATIENT
Start: 2023-07-05 | End: 2023-07-06

## 2023-07-05 RX ADMIN — ROCURONIUM BROMIDE 45 MG: 10 INJECTION, SOLUTION INTRAVENOUS at 11:07

## 2023-07-05 RX ADMIN — NOREPINEPHRINE BITARTRATE 0.5 MCG/KG/MIN: 4 INJECTION, SOLUTION INTRAVENOUS at 11:07

## 2023-07-05 RX ADMIN — OCTREOTIDE ACETATE 50 MCG/HR: 500 INJECTION, SOLUTION INTRAVENOUS; SUBCUTANEOUS at 11:07

## 2023-07-05 RX ADMIN — VASOPRESSIN 0.04 UNITS/MIN: 20 INJECTION INTRAVENOUS at 02:07

## 2023-07-05 RX ADMIN — PANTOPRAZOLE SODIUM 40 MG: 40 INJECTION, POWDER, FOR SOLUTION INTRAVENOUS at 07:07

## 2023-07-05 RX ADMIN — MAGNESIUM SULFATE 2 G: 2 INJECTION INTRAVENOUS at 11:07

## 2023-07-05 RX ADMIN — PROPOFOL 10 MCG/KG/MIN: 10 INJECTION, EMULSION INTRAVENOUS at 11:07

## 2023-07-05 RX ADMIN — TRANEXAMIC ACID 1000 MG: 10 INJECTION, SOLUTION INTRAVENOUS at 09:07

## 2023-07-05 RX ADMIN — RIFAXIMIN 550 MG: 550 TABLET ORAL at 08:07

## 2023-07-05 RX ADMIN — IOHEXOL 100 ML: 350 INJECTION, SOLUTION INTRAVENOUS at 02:07

## 2023-07-05 RX ADMIN — SODIUM CHLORIDE 1000 ML: 9 INJECTION, SOLUTION INTRAVENOUS at 10:07

## 2023-07-05 RX ADMIN — MICONAZOLE NITRATE: 20 OINTMENT TOPICAL at 08:07

## 2023-07-05 RX ADMIN — INSULIN ASPART 8 UNITS: 100 INJECTION, SOLUTION INTRAVENOUS; SUBCUTANEOUS at 06:07

## 2023-07-05 RX ADMIN — THERA TABS 1 TABLET: TAB at 08:07

## 2023-07-05 RX ADMIN — PANTOPRAZOLE SODIUM 80 MG: 40 INJECTION, POWDER, FOR SOLUTION INTRAVENOUS at 10:07

## 2023-07-05 RX ADMIN — SODIUM BICARBONATE 50 MEQ: 84 INJECTION, SOLUTION INTRAVENOUS at 02:07

## 2023-07-05 RX ADMIN — MICONAZOLE NITRATE 2 % TOPICAL POWDER: at 08:07

## 2023-07-05 RX ADMIN — PANTOPRAZOLE SODIUM 8 MG/HR: 40 INJECTION, POWDER, FOR SOLUTION INTRAVENOUS at 03:07

## 2023-07-05 RX ADMIN — PROPOFOL 20 MCG/KG/MIN: 10 INJECTION, EMULSION INTRAVENOUS at 01:07

## 2023-07-05 RX ADMIN — VASOPRESSIN: 20 INJECTION INTRAVENOUS at 02:07

## 2023-07-05 RX ADMIN — OCTREOTIDE ACETATE 50 MCG/HR: 500 INJECTION, SOLUTION INTRAVENOUS; SUBCUTANEOUS at 08:07

## 2023-07-05 RX ADMIN — OCTREOTIDE ACETATE 100 MCG: 100 INJECTION, SOLUTION INTRAVENOUS; SUBCUTANEOUS at 10:07

## 2023-07-05 RX ADMIN — ZINC SULFATE 220 MG (50 MG) CAPSULE 220 MG: CAPSULE at 08:07

## 2023-07-05 RX ADMIN — MUPIROCIN: 20 OINTMENT TOPICAL at 08:07

## 2023-07-05 RX ADMIN — PROPOFOL 5 MCG/KG/MIN: 10 INJECTION, EMULSION INTRAVENOUS at 04:07

## 2023-07-05 RX ADMIN — FOLIC ACID 1 MG: 1 TABLET ORAL at 08:07

## 2023-07-05 RX ADMIN — CALCIUM GLUCONATE 1 G: 20 INJECTION, SOLUTION INTRAVENOUS at 08:07

## 2023-07-05 RX ADMIN — ETOMIDATE 15 MG: 2 INJECTION INTRAVENOUS at 11:07

## 2023-07-05 RX ADMIN — Medication 100 MCG: at 11:07

## 2023-07-05 RX ADMIN — SODIUM BICARBONATE 50 MEQ: 84 INJECTION, SOLUTION INTRAVENOUS at 01:07

## 2023-07-05 RX ADMIN — Medication 50 MEQ: at 02:07

## 2023-07-05 RX ADMIN — LEVETIRACETAM 1000 MG: 500 TABLET, FILM COATED ORAL at 08:07

## 2023-07-05 RX ADMIN — Medication 100 MG: at 08:07

## 2023-07-05 RX ADMIN — PANTOPRAZOLE SODIUM 8 MG/HR: 40 INJECTION, POWDER, FOR SOLUTION INTRAVENOUS at 11:07

## 2023-07-05 RX ADMIN — INSULIN HUMAN 10 UNITS: 100 INJECTION, SOLUTION PARENTERAL at 01:07

## 2023-07-05 RX ADMIN — CALCIUM GLUCONATE 1 G: 20 INJECTION, SOLUTION INTRAVENOUS at 07:07

## 2023-07-05 RX ADMIN — CEFTRIAXONE 1 G: 1 INJECTION, POWDER, FOR SOLUTION INTRAMUSCULAR; INTRAVENOUS at 01:07

## 2023-07-05 RX ADMIN — NOREPINEPHRINE BITARTRATE 0.08 MCG/KG/MIN: 4 INJECTION, SOLUTION INTRAVENOUS at 06:07

## 2023-07-05 RX ADMIN — BACITRACIN: 500 OINTMENT TOPICAL at 08:07

## 2023-07-05 RX ADMIN — LACTULOSE 20 G: 20 SOLUTION ORAL at 08:07

## 2023-07-05 RX ADMIN — SENNOSIDES AND DOCUSATE SODIUM 1 TABLET: 50; 8.6 TABLET ORAL at 08:07

## 2023-07-05 RX ADMIN — VASOPRESSIN 0.04 UNITS/MIN: 20 INJECTION INTRAVENOUS at 09:07

## 2023-07-05 NOTE — CONSULTS
[facility]  Gastroenterology  Consult Note    Patient Name: Marely Hamilton  MRN: [id number]  Admission Date: 7/5/2023  Hospital Length of Stay: 0 days  Code Status: Prior   Attending Provider: Denilson Lopez MD   Consulting Provider: Roberto Faith MD  Primary Care Physician: Viktor Ross MD  Principal Problem:<principal problem not specified>    Inpatient consult to Gastroenterology  Consult performed by: Roberto Faith MD  Consult ordered by: Denilson Lopez MD      Subjective:     HPI: Marely Hamilton is a 58 y.o. female with history of alcoholic cirrhosis, seizure disorder, DVT and IJ thrombus with recent admission for large retroperitoneal hemorrhage requiring IR embolization and IVC filter placement who presents for hematochezia. Onset this morning at SNF, alida blood per rectum per chart, sent to ED. Initially normotensive but then severe hypotension prompted initiation of levophed and intubation. Hgb 6.8, 2u pRBC given + 1u FFP ordered. Hgb 8.5 on 7/2. On 2 mcg/kg/min of levophed currently. K 6.6 but renal function at baseline. Repeat pending. No prior EGDs on record.         Objective:     Vitals:    07/05/23 1247   BP: 131/63   Pulse: 102   Resp: (!) 5   Temp:        Constitutional:  intubated, sedated  HENT: Head: Normal, normocephalic, atraumatic.  Eyes: conjunctiva clear and sclera nonicteric  Cardiovascular: regular rate and rhythm and no murmur  Respiratory: mechanical breath sounds  GI: soft, non-tender, without masses or organomegaly  Musculoskeletal: no muscular tenderness noted  Skin: normal color  Neurological: sedated      Significant Labs:  Reviewed the following pertinent laboratory tests:   Hgb 6.8  Plts 93  INR 2.0.     Significant Imaging:  Imaging reviewed: CXR 7/5. Interpretation:    Bilateral basilar opacities, improving  L IJ CVL in place    Assessment/Plan:     Marely Hamilton is a 58 y.o. female with history of alcoholic cirrhosis here with hematochezia.  Hemodynamically unstable in ED prompting intubation and initiation of vasopressors. No hematemesis. No recent EGDs. Stat CTA to evaluate for active lower source that could be embolized. If negative, EGD today to evaluate for brisk UGIB. Hyperkalemic but renal function wnl, at baseline, repeat pending but if elevated would shift prior to endoscopy.    Problem List:  Hematochezia  Hemorrhagic shock    Recommendations:  - If CTA negative for active extravasation that would benefit from IR embolization, plan for EGD 7/5  - Trend Hgb q6 hrs. Transfuse for Hgb > 7, unless otherwise indicated  - Maintain IV access with 2 large bore Ivs  - Intravascular resuscitation/support with IVFs   - Keep NPO  - Hold all NSAIDs and anticoagulants, unless contraindicated  - Bolus IV pantoprazole 80mg followed by 40mg BID  - Octreotide bolus 50mcg, then gtt at 50mcg/hr.  - Ceftriaxone 1g IV Q daily for primary prophylaxis. Complete 5 day course.  - Please correct any coagulopathy with platelets and FFP for goal of platelets >50K and INR <2.0  - Please notify GI team if there is significant change in patient's clinical status      Thank you for involving us in the care of Marely Hamilton. Please call with any additional questions, concerns or changes in the patient's clinical status. We will continue to follow.     Roberto Faith MD  Gastroenterology Fellow PGY V  [facility]

## 2023-07-05 NOTE — NURSING
Notified by PCT of patients bloody stools. Patient noted to have a large dark red stool. NP on call notified. Patient then began having profuse bleeding from rectum. 911 called for emergency transport to the ER. Patient's sister called and notified of transfer. Report called to ER charge nurse.

## 2023-07-05 NOTE — ED PROVIDER NOTES
Encounter Date: 7/5/2023       History     Chief Complaint   Patient presents with    Altered Mental Status     58-year-old female with history of hepatic encephalopathy, cirrhosis, alcohol abuse presents with passing dark blood per rectum for the past day.  She seems to have a diminished level of consciousness and has been weaker.  No known history of varices    Review of patient's allergies indicates:   Allergen Reactions    Sulfa (sulfonamide antibiotics) Rash    Codeine Nausea And Vomiting     Past Medical History:   Diagnosis Date    Addiction to drug     Alcohol abuse     Alcohol abuse, in remission 6/15/2015    14.5 weeks ago; AA weekly    Anemia     Anxiety 6/15/2015    Behavioral problem     Bipolar disorder     Bipolar disorder in remission 6/15/2015    Cirrhosis, Laennec's 6/15/2015    Depression     Encounter for blood transfusion     Epistaxis 6/15/2015    Fatigue     Glaucoma     Hematuria     Hepatic encephalopathy 6/15/2015    Hepatic enlargement     History of psychiatric care     History of psychiatric hospitalization     History of seizure 6/15/2015    1    hx of intentional Tylenol overdose 6/15/2015    2005- situational and hx of bipolar    Hx of psychiatric care     Macrocytic anemia 9/18/2015    6 units PRBC since June 2015    Jeana     Osteoarthritis     Other ascites 6/15/2015    Psychiatric exam requested by authority     Psychiatric problem     Psychosis 9/26/2019    Renal disorder     Seizures     Self-harming behavior     Suicide attempt     Therapy     Thrombocytopenia 6/15/2015     Past Surgical History:   Procedure Laterality Date    COSMETIC SURGERY      EMBOLIZATION N/A 6/11/2023    Procedure: EMBOLIZATION, BLOOD VESSEL;  Surgeon: Debbie Surgeon;  Location: [facility];  Service: Anesthesiology;  Laterality: N/A;    ESOPHAGOGASTRODUODENOSCOPY       Family History   Problem Relation Age of Onset    Alcohol abuse Father     Suicide Father      Bipolar disorder Father     Alcohol abuse Sister     Hypertension Mother     Alcohol abuse Paternal Grandfather     Drug abuse Neg Hx     Dementia Neg Hx      Social History     Tobacco Use    Smoking status: Never    Smokeless tobacco: Never   Substance Use Topics    Alcohol use: Not Currently     Comment: hx of ETOH abuse with cirrhosis of liver    Drug use: No     Review of Systems    Physical Exam     Initial Vitals   BP Pulse Resp Temp SpO2   07/05/23 1020 07/05/23 1020 07/05/23 1020 07/05/23 1055 07/05/23 1020   (!) 140/90 80 (!) 21 97.9 °F (36.6 °C) 96 %      MAP       --                Physical Exam    Constitutional:   Patient is able to talk and communicate well.   Eyes: Conjunctivae are normal. Pupils are equal, round, and reactive to light.   Neck:   Normal range of motion.  Cardiovascular:  Normal rate, regular rhythm and normal heart sounds.           Pulmonary/Chest: Breath sounds normal. No respiratory distress. She has no wheezes. She has no rales.   Abdominal: Abdomen is soft. Bowel sounds are normal. She exhibits no distension. There is no abdominal tenderness.   Dark red blood coming from rectum   Musculoskeletal:         General: Normal range of motion.      Cervical back: Normal range of motion.     Neurological: She is alert. She has normal strength. No cranial nerve deficit.       ED Course   Intubation    Date/Time: 7/5/2023 1:40 PM  Location procedure was performed: [facility] [place] [facility]  Performed by: Denilson Lopez MD  Authorized by: Denilson Lopez MD   Consent Done: Emergent Situation  Indications: airway protection  Intubation method: fiberoptic oral  Patient status: paralyzed (RSI)  Preoxygenation: mask  Sedatives: etomidate  Paralytic: rocuronium  Tube size: 7.5 mm  Tube type: cuffed  Number of attempts: 1  Post-procedure assessment: CO2 detector  Breath sounds: clear      Central Line    Date/Time: 7/5/2023 1:41 PM  Performed by: Denilson Lopez MD  Authorized by:  Denilson Harp MD     Location procedure was performed:  [facility] EMERGENCY DEPARTMENT  Consent Done ?:  Emergent Situation  Indications:  Vascular access  Preparation:  Skin prepped with ChloraPrep  Location:  Left internal jugular  Catheter size:  7.5 Fr  Ultrasound guidance: Yes    Manometry: Yes    Number of attempts:  1  XRay:  Placement verified by x-ray, no pneumothorax on x-ray and successful placement  Adverse Events:  None  Labs Reviewed   CBC W/ AUTO DIFFERENTIAL - Abnormal; Notable for the following components:       Result Value    RBC 2.04 (*)     Hemoglobin 6.8 (*)     Hematocrit 22.8 (*)      (*)     MCH 33.3 (*)     MCHC 29.8 (*)     Platelets 93 (*)     Immature Granulocytes 0.8 (*)     Immature Grans (Abs) 0.07 (*)     All other components within normal limits    Narrative:     Collection has been rescheduled by T at 07/05/2023 10:31 Reason:   Nurse Draw   COMPREHENSIVE METABOLIC PANEL - Abnormal; Notable for the following components:    Potassium 6.6 (*)     Chloride 114 (*)     CO2 19 (*)     Glucose 116 (*)     BUN 28 (*)     Calcium 8.5 (*)     Total Protein 5.1 (*)     Albumin 2.2 (*)     Total Bilirubin 6.2 (*)     All other components within normal limits   PROTIME-INR - Abnormal; Notable for the following components:    Prothrombin Time 20.4 (*)     INR 2.0 (*)     All other components within normal limits   AMMONIA - Abnormal; Notable for the following components:    Ammonia 190 (*)     All other components within normal limits   LITHIUM LEVEL - Abnormal; Notable for the following components:    Lithium Level 0.2 (*)     All other components within normal limits   FIBRINOGEN - Abnormal; Notable for the following components:    Fibrinogen 90 (*)     All other components within normal limits    Narrative:     ADD ON FIBR [id number]  PER DENILSON HARP MD.  07/05/2023  12:42   Lenka Devi RN acknowledged and accepted results on test(s)   Fibrinogen via secure chat.  by [email or web address]  07/05/2023 13:16   ISTAT PROCEDURE - Abnormal; Notable for the following components:    POC PH 7.098 (*)     POC PCO2 50.6 (*)     POC PO2 84 (*)     POC HCO3 15.6 (*)     POC SATURATED O2 91 (*)     POC TCO2 17 (*)     All other components within normal limits   POCT GLUCOSE - Abnormal; Notable for the following components:    POCT Glucose 270 (*)     All other components within normal limits   FIBRINOGEN   CBC W/ AUTO DIFFERENTIAL   LACTIC ACID, PLASMA   TYPE & SCREEN   ISTAT CHEM8   ISTAT CHEM8   PREPARE RBC SOFT   PREPARE FRESH FROZEN PLASMA SOFT          Imaging Results              X-Ray Chest AP Portable (Final result)  Result time 07/05/23 13:09:11      Final result by Americo Reyes MD (07/05/23 13:09:11)                   Impression:      No significant detrimental interval change in the appearance of the chest since 06/25/2023 is appreciated, with significant improvement as noted above.  No post procedure pneumothorax.      Electronically signed by: Americo Reyes MD  Date:    07/05/2023  Time:    13:09               Narrative:    EXAMINATION:  XR CHEST AP PORTABLE    TECHNIQUE:  One view was obtained.  Note is made of the fact that the thorax is obscured to some extent by opacities external to the patient, particularly defibrillator pads.    COMPARISON:  Comparison is made to the most recent prior chest radiograph of 06/25/2023.    FINDINGS:  Endotracheal tube has been placed, its tip lying just superior to the apex of the aortic arch, well above the katharina.  Left jugular origin vascular catheter is now seen, its tip in the superior vena cava near the junction of the right and left brachiocephalic veins.  Allowing for magnification of the cardiomediastinal silhouette related to projection, the heart is not significantly enlarged.  Opacity in both inferior hemithoraces seen on the previous examination has resolved, consistent with clearing of airspace consolidation in both mid/lower lung zones and resolution of  previously present bilateral pleural fluid.  Lung zones are currently clear and free of significant airspace consolidation or volume loss.  No pleural fluid of any substantial volume is seen on either side.  No pneumothorax.                                       Medications   sucralfate 100 mg/mL suspension 1 g (1 g Oral Not Given 7/5/23 1200)   aluminum-magnesium hydroxide-simethicone 200-200-20 mg/5 mL suspension 30 mL (0 mLs Oral Hold 7/5/23 1130)   octreotide (SANDOSTATIN) 500 mcg in sodium chloride 0.9% 100 mL infusion (50 mcg/hr Intravenous New Bag 7/5/23 1141)   NORepinephrine 4 mg in dextrose 5% 250 mL infusion (premix) (titrating) (2 mcg/kg/min × 44.7 kg Intravenous Rate/Dose Change 7/5/23 1300)   0.9%  NaCl infusion (for blood administration) (has no administration in time range)   0.9%  NaCl infusion (for blood administration) (has no administration in time range)   dextrose 50% injection 25 g (0 g Intravenous Hold 7/5/23 1200)   dextrose 10% bolus 125 mL 125 mL (has no administration in time range)   dextrose 10% bolus 250 mL 250 mL (has no administration in time range)   0.9%  NaCl infusion (for blood administration) (has no administration in time range)   cefTRIAXone (ROCEPHIN) 1 g in dextrose 5 % in water (D5W) 5 % 100 mL IVPB (MB+) (1 g Intravenous New Bag 7/5/23 1319)   cefTRIAXone (ROCEPHIN) 1 g in dextrose 5 % in water (D5W) 5 % 100 mL IVPB (MB+) (has no administration in time range)   sodium chloride 0.9% flush 10 mL (has no administration in time range)   chlorhexidine 0.12 % solution 15 mL (has no administration in time range)   sodium chloride 0.9% bolus 1,000 mL 1,000 mL (0 mLs Intravenous Stopped 7/5/23 1337)   pantoprazole injection 80 mg (80 mg Intravenous Given 7/5/23 1057)   pantoprazole (PROTONIX) 40 mg in sodium chloride 0.9 % 100 mL IVPB (MB+) (8 mg/hr Intravenous New Bag 7/5/23 1101)   octreotide injection 100 mcg (100 mcg Intravenous Given 7/5/23 1057)   etomidate injection 15 mg  (15 mg Intravenous Given 7/5/23 1144)   rocuronium injection 45 mg (45 mg Intravenous Given 7/5/23 1145)   propofol (DIPRIVAN) 10 mg/mL infusion (20 mcg/kg/min × 44.7 kg Intravenous New Bag 7/5/23 1336)   phenylephrine HCl in 0.9% NaCl 1 mg/10 mL (100 mcg/mL) syringe 100 mcg ( Intravenous Not Given 7/5/23 1200)   sodium bicarbonate solution 50 mEq (50 mEq Intravenous Given 7/5/23 1336)   insulin regular injection 10 Units 0.1 mL (10 Units Intravenous Given 7/5/23 1333)     Medical Decision Making:   Initial Assessment:   Patient with what looks like a massive GI bleed.  Blood pressure is stable at the moment.  Will start Protonix and octreotide.  ED Management:  Patient is mental status deteriorated.  She became more confused.  She became hypotensive.    Hypotension did n for GI bleed.  ot respond to fluid and Burton-Synephrine bump.  Will start Levophed.  Her IV access is not ideal.  I placed an emergent central line and then intubated the patient.  Released 0- blood.  Ordered FFP.  Blood pressure stabilized.  Consult discussed with critical care and Gastroenterology.  Plan is to do a CTA abdomen pelvis.   Will go to endoscopy if no lower source.    Initial potassium was high.  This is unexpected since she normally does not run hyperkalemic.  Could be hump hemolyzed since she was a difficult stick.  We redrew from central line.  It was 5.2.  We shifted in case she needs this corrected for procedure.          Attending Attestation:         Attending Critical Care:   Critical Care Times:   Direct Patient Care (initial evaluation, reassessments, and time considering the case)................................................................60 minutes.   Additional History from reviewing old medical records or taking additional history from the family, EMS, PCP, etc.......................4 minutes.   Ordering, Reviewing, and Interpreting Diagnostic  Studies...............................................................................................................5 minutes.   Documentation..................................................................................................................................................................................4 minutes.   Consultation with other Physicians. .................................................................................................................................................8 minutes.   ==============================================================  Total Critical Care Time - exclusive of procedural time: 81 minutes.  ==============================================================     [unfilled]   ED Course as of 07/06/23 0643   Wed Jul 05, 2023 1404 Fibrinogen [DS]      ED Course User Index  [DS] Colleen Michele MD                 Clinical Impression:   Final diagnoses:  [K92.2] GI bleeding  [Z78.9] Intubation of airway performed without difficulty  [E87.5] Hyperkalemia  [R53.1] Weakness        ED Disposition Condition    Admit                 Denilson Lopez MD  07/05/23 1344       Denilson Lopez MD  07/06/23 0643

## 2023-07-05 NOTE — PROGRESS NOTES
[facility]                                  Skilled Nursing Facility                   Progress Note     Admit Date: 6/30/2023  BRAYAN   Principal Problem:  Non-convulsive status epilepticus   HPI obtained from patient interview and chart review     Chief Complaint: Profuse rectal bleeding    HPI: Marely Hamilton is a 58 y.o. female with past psychiatric history of bipolar disorder, alcohol use disorder & past pertinent medical history of seizure disorder (on AEDs), ETOH cirrhosis presents to the ED/admitted to hospital on 5/28 for Acute encephalopathy.     Frequent left hemispheric electrographic seizures and NCSE on EEG. Treated with Keppra, lactulose and rifaximin. Intubated for airway protection; extubated 6/16. Completed rocephin x 7 days for Proteus mirabilis UTI. No epileptiform discharges on EEG x 24. Acute DVTs Right brachial, femoral, and IJ. Tx argatroban-->heparin then developed large retroperitoneal hemorrhage requiring 9 units PRBCs, 11 platelet doses, 3 FFP, 2 cryoprecipitate. Now S/P IR embolization of left lumbar and internal iliac branch and S/P IVC filter placement.     Hypercalcemia treated by holding lithium. H/O severe episodes of crystal with rapid decompensation.  Previously very stable on Lithium for mood stabilization. Per psych consult 6/30: Continue lithium 150mg qhs, stop trazodone, and decrease q HS Zyprexa from 10 to 5 mg as early as 7/2. Goal lithium level 0.6-1.2mEq/L. Check lithium level Sunday 7/2 and monitor renal function. Follow-up with outpt [facility], Dr. Coates, as early as 7/3.    Patient will be treated at [facility] with PT and OT to improve functional status and ability to perform ADLs. Consults placed to RT, RD, SLP, PT/OT, Wound Care, Case Management, TelePsychiatry. Sister plans to come to [place] to assist with transition from SNF to home with sitter with long-term goal of moving [place] to [place].     Interval history:    Rectal bleeding noted this morning, alida blood. During pericare bleeding became profuse no stool noted. Emergency services activated for emergent ED transfer. IV placed. Patient is alert and oriented x1 at baseline. Baseline jaundice.     ROS  Constitutional: Negative for fever   Eyes: Negative for blurred vision, double vision   Respiratory: Negative for cough or SOB  Cardiovascular: Negative for chest pain, palpitations, and leg swelling.   Gastrointestinal: Negative for abdominal pain, constipation, diarrhea, nausea, vomiting. + decreased appetite  Genitourinary: Negative for dysuria, frequency   Musculoskeletal: + generalized weakness. Negative for back pain and myalgias.   Skin: Negative for itching and rash. + wounds, bruising  Neurological: Negative for dizziness, headaches. + memory loss   Psychiatric/Behavioral: Denies depression or anxiety    24 hour Vital Sign Range   Temp:  [98.7 °F (37.1 °C)]   Pulse:  [55-56]   Resp:  [18]   BP: (105-110)/(57-58)   SpO2:  [96 %-97 %]     Current BMI: Body mass index is 18.02 kg/m².    PEx  Constitutional: Patient appears catechetic, disheveled, chronically ill, debilitated.  No acute distress noted  HENT:   Head: Normocephalic.   Eyes: Pupils are equal, round  Neck: Normal range of motion.   Cardiovascular: Normal rate, regular rhythm and normal heart sounds.    Pulmonary/Chest: Effort normal and breath sounds are clear. 3L NC.  Abdominal: Soft. Bowel sounds are active.   Musculoskeletal: generalized weakness  Neurological: Alert and oriented to person and place. Confused. Inattention. RUE tremor.  Psychiatric: Flat affect.   Skin: Skin is warm and dry. Full skin assessment completed bu NP on initial exam. Extensive generalized  bruising in various stages of healing. Multiple active wounds as follows:       Altered Skin Integrity 06/03/23 1300  Groin Moisture associated dermatitis (Active) POA   Location: Groin   Is  this injury device related?: No   Primary Wound Type: Moisture ass   Wound Image     Description of Altered Skin Integrity Intact skin with non-blanchable redness of localized area   Dressing Appearance Open to air   Drainage Amount None   Drainage Characteristics/Odor No odor   Appearance Red;Moist   Tissue loss description Partial thickness   Wound Edges Irregular            Altered Skin Integrity 06/03/23 1900 Right Throat Blister(s) Purple or maroon localized area of discolored intact skin or non-intact skin or a blood-filled blister. (Active) POA   Side: Right   Location: Throat   Is this injury device related?: No   Primary Wound Type: Blister(s)   Description of Altered Skin Integrity: Purple or maroon localized area of discolored intact skin or non-intact skin or a blood-filled blister.   Description of Altered Skin Integrity Intact skin with non-blanchable redness of localized area   Dressing Appearance Open to air   Drainage Amount None   Drainage Characteristics/Odor No odor   Appearance Dry;Maroon            Altered Skin Integrity 06/05/23 1154 Left posterior Greater trochanter Moisture associated dermatitis (Active) POA   Side: Left   Orientation: posterior   Location: Greater trochanter   Primary Wound Type: Moisture ass   Description of Altered Skin Integrity Intact skin with non-blanchable redness of localized area   Dressing Appearance Open to air   Drainage Amount None   Drainage Characteristics/Odor No odor   Appearance Pink;Red   Tissue loss description Partial thickness   Periwound Area Pink;Moist            Altered Skin Integrity 06/05/23 1154 Right posterior Greater trochanter Moisture associated dermatitis (Active) POA   Side: Right   Orientation: posterior   Location: Greater trochanter   Primary Wound Type: Moisture ass   Wound Image     Description of Altered Skin Integrity Intact skin with non-blanchable redness of localized area   Dressing Appearance Open to air   Drainage Amount None    Drainage Characteristics/Odor No odor   Appearance Red   Tissue loss description Partial thickness   Periwound Area Pink;Moist            Altered Skin Integrity 06/18/23 2100 medial Nose Blister(s) Intact skin with non-blanchable redness of localized area (Active) POA   Orientation: medial   Location: Nose   Primary Wound Type: Blister(s)   Description of Altered Skin Integrity: Intact skin with non-blanchable redness of localized area   Wound Image     Description of Altered Skin Integrity Full thickness tissue loss. Base is covered by slough and/or eschar in the wound bed   Dressing Appearance Open to air;Dried drainage   Drainage Amount None   Drainage Characteristics/Odor No odor   Appearance Dry   Tissue loss description Partial thickness   Periwound Area Redness   Wound Edges Irregular            Altered Skin Integrity 06/25/23 1001 midline Frontal region Other (comment) Full thickness tissue loss. Subcutaneous fat may be visible but bone, tendon or muscle are not exposed (Active) POA   Orientation: midline   Location: Frontal region   Primary Wound Type: Other   Description of Altered Skin Integrity: Full thickness tissue loss. Subcutaneous fat may be visible but bone, tendon or muscle are not exposed   Description of Altered Skin Integrity Partial thickness tissue loss. Shallow open ulcer with a red or pink wound bed, without slough. Intact or Open/Ruptured Serum-filled blister.   Dressing Appearance Open to air   Drainage Amount None   Drainage Characteristics/Odor No odor   Appearance Dry   Tissue loss description Partial thickness            Altered Skin Integrity 06/27/23 0506 upper Sternal Skin Tear (Active) POA   Orientation: upper   Location: Sternal   Primary Wound Type: Skin tear   Wound Image     Description of Altered Skin Integrity Partial thickness tissue loss. Shallow open ulcer with a red or pink wound bed, without slough. Intact or Open/Ruptured Serum-filled blister.   Dressing Appearance  "Dry;Intact;Clean   Drainage Amount None   Drainage Characteristics/Odor No odor   Appearance Red   Tissue loss description Partial thickness   Red (%), Wound Tissue Color 100 %   Periwound Area Redness;Moist   Wound Edges Irregular   Wound Length (cm) 2 cm   Wound Width (cm) 2 cm   Wound Depth (cm) 0.1 cm   Wound Volume (cm^3) 0.4 cm^3   Wound Surface Area (cm^2) 4 cm^2            Altered Skin Integrity 06/29/23 1523 Sacral spine Moisture associated dermatitis (Active) POA   Location: Sacral spine   Wound Number:    Is this injury device related?:    Primary Wound Type: Moisture ass   Wound Image     Dressing Appearance Open to air   Drainage Amount None   Drainage Characteristics/Odor No odor   Appearance Red   Tissue loss description Partial thickness   Periwound Area Moist;Fonda   Wound Edges Irregular   Wounds followed closely by Wound Care.      No results for input(s): GLUCOSE, NA, K, CL, CO2, BUN, CREATININE, MG in the last 24 hours.    Invalid input(s):  CALCIUM    No results for input(s): WBC, RBC, HGB, HCT, PLT, MCV, MCH, MCHC in the last 24 hours.    No results for input(s): POCTGLUCOSE in the last 168 hours.     Assessment and Plan:    Profuse rectal bleed  Emergency services activated for ED transfer  D/w attending    Non-convulsive status epilepticus  Acute metabolic encephalopathy  Maintain seizure precautions  PO Keppra 1G BID  Rifaximin po 550 mg BID  Lactulose po 20 g TID - titrate for > 3 BMs q day     Acute respiratory failure with hypoxia  6/2 Intubated for airway protection r/t status epilepticus; extubated 6/16 to RA  Patient requiring 2 to 3 L NC to maintain SpO2 > 90% since SNF admit  RT consulted for oxygen monitoring, titration, and weaning of O2 per sat goal     Acute metabolic encephalopathy  Multifactorial; r/t Hepatic encephalopathy & Non-convulsive status epilepticus  Proteus mirabilis UTI, resolved in hospital s/p 7D course rocephin   Maintain Delirium precautions  See "Hepatic " "encephalopathy" and "Non-convulsive status epilepticus"     Hepatic encephalopathy  Chronic, unstable.  Persistent confusion - worsened from baseline per sister  Elevated ammonia on hospital admit, WNL by discharge.   Continue Lactulose and rifaximin  Titrate therapy prn to goal for BM > 3 a day     Acute deep vein thrombosis (DVT) of brachial vein of right upper extremity  Deep vein thrombosis (DVT) of femoral vein of right lower extremity  Right IJ DVT  Argatroban transitioned to heparin then stopped d/t retroperitoneal hemorrhage and thrombocytopenia.   Now S/P IVC filter placed 6/14   Maintain SCDs      Macrocytic anemia   Anemia of chronic disease           Recent H/O Acute blood loss anemia  Recent H/O Large Right retroperitoneal bleed  Recent H/O Hypovolemic shock  Received 9 units PRBCs, 11 platelet doses, 3 FFP, 2 cryoprecipitate in short term acute  S/P IR embolization of left lumbar and internal iliac branch 6/13  Previously on epoetin  Maintain bleeding precautions  Monitor Hgb with CBC Diff q T/Fri   Transfuse PRBC for Hgb < 7.0 and / or active bleeding     Thrombocytopenia  Chronic, due to liver failure  S/p 11 platelet doses, 3 FFP, 2 cryoprecipitate in short term acute  Bleeding precautions  Monitor with routinely scheduled CBC Diff  Transfuse for platelets <15 to <20K if pt develops fever, infection, or inflammation  Prophylactically transfuse for platelets < 10K to prevent spontaneous bleeding      Acute liver failure with hepatic coma  Ascites  R/T Alcohol abuse, in remission  Furosemide 40 mg BID hold for SBP <100 or DBP <60  Spironolactone 50 mg qhs  Thiamine 100 mg po daily  Refer patient to outpatient hepatology at discharge or during SNF admit if indicated     Bipolar 1 disorder  As per Psych consult day of hospital discharge 6/30:  - Continue lithium 150mg qhs, stop trazodone  - Decreased q HS Zyprexa from 10 to 5 mg per psych recs  - Goal lithium level 0.6-1.2mEq/L.   - Check lithium level " Sunday 7/2 and monitor renal function.   - Follow-up with outpt [facility], Dr. Coates, as early as 7/3.  -Telehealth psych consult placed lithium level is 0.1     Severe protein-calorie malnutrition       Body mass index is 18.02 kg/m².       Nutrition consulted.        TID nutritional supplements       MVI, Thiamine, Zinc po daily    Tremor RUE  On propanolol       Diet:  low sodium dysphagia soft diet. Boost plus TID with meals  GI PPx: not needed  DVT PPx:  SCD/SINCERE, s/p IVC. AC held due to thrombocytopenia  Airways: room air  Wounds: nasal bridge skin tear, skin tears on BUE    Anticipate disposition:  Home with home health      Follow-up needed during SNF stay- Telepsych, possibly hepatology    Follow-up needed after discharge from SNF: PCP, Hepatology, Psychiatry    No future appointments.      I certify that SNF services are required to be given on an inpatient basis because Marely Hamilton needs for skilled nursing care and/or skilled rehabilitation are required on a daily basis and such services can only practically be provided in a skilled nursing facility setting and are for an ongoing condition for which she received inpatient care in the hospital.     Total time of the visit 45 minutes  Description of non physical exam/non charting time: counseling patient on clinical conditions and therapies provided.  Extensive chart review completed including all consultation notes.  All pertinent laboratory and radiographical images reviewed.        PUSHPA PAREKH  Department of Hospital Medicine   [facility]     DOS: 7/5/2023       Patient note was created using MModal Dictation.  Any errors in syntax or even information may not have been identified and edited on initial review prior to signing this note.

## 2023-07-05 NOTE — HPI
Marely Hamilton is a 58 y.o. female with history of alcoholic cirrhosis, seizure disorder, DVT and IJ thrombus with recent admission for large retroperitoneal hemorrhage requiring IR embolization and IVC filter placement who presents for hematochezia. Onset this morning at SNF, alida blood per rectum per chart, sent to ED. Initially normotensive but then severe hypotension prompted initiation of levophed and intubation. Hgb 6.8, 2u pRBC given + 1u FFP ordered. Hgb 8.5 on 7/2. On levo and vaso currently. K 6.6 but renal function at baseline. Repeat pending. No prior EGDs on record.

## 2023-07-05 NOTE — PT/OT/SLP PROGRESS
Speech Language Pathology      Marely Hamilton  MRN: [id number]    Patient not seen today secondary to  (pt sent to ED and was admitted to ICU). It is assumed that pt will not return to unit within 24 hours. Please re-consult SLP services as appropriate.

## 2023-07-05 NOTE — EICU
Intervention Initiated From:  Bedside    Vic intervened regarding:  Documentation    Comments: Called to bedside for emergent arterial line placement.  MD verification completed.  MD made aware of coag profile. Art line placed to (R) radial by MD Ibarra using u/s guidance.  Good waveform noted to transducer.  Pt tolerated procedure well.

## 2023-07-05 NOTE — PT/OT/SLP PROGRESS
Occupational Therapy    Not Seen    Marely Hamilton  MRN: [id number]  Room/Bed: QOQI188/HFPF990 A    Pt not seen today by OT secondary to pt being sent off floor to ED. Will follow up per POC.     COBY Montemayor  7/5/2023

## 2023-07-05 NOTE — CARE UPDATE
Admitted to SICU from ED by ED nurse. Patient placed on bedside monitor. SICU charge and respiratory at the bedside upon arrival. Critical Care Medicine team aware of arrival and assessment.

## 2023-07-05 NOTE — PT/OT/SLP PROGRESS
Physical Therapy      Patient Name:  Marely Hamilton   MRN:  [id number]    Patient not seen today secondary to  (pt transfered out d/t bleeding from rectum, will follow up per POC).

## 2023-07-05 NOTE — HPI
Marely Hamilton is a 58 year old female for whom GI is consulted for suspected upper gastrointestinal bleeding. She has a medical history significant for decompensated alcoholic cirrhosis c/b hepatic encephalopathy and ascites, along with seizure disorder.

## 2023-07-05 NOTE — HOSPITAL COURSE
Patient was seen at bedside, hematochezia still present post op by IR. Patient has required many transfusions of pRBCs and platelets due to ongoing down trending of Hgb. No clear source of ongoing hemorrhage by imaging. Patient has continued to require pressors to maintain MAP >65. Discussed with GI and IR regarding active bleeding post op, IR indicated there is not much else to do from their end bc they embolized a significant part of GDA. Pt is off of pressor requirement and not actively bleeding. GI re evaluated pt via EGD and found no sources of active bleeding. Pt is extubated and currently on BIPAP requirement due to de saturation in the 80's. Pt is to be seen by speech and PT/OT. Clear mucinous secretions via suction, chest physiotherapy, and cough assist device. Nebs to help with breathing as well. Pt is off BIPAP and currently on comfort flow. Triple therapy (amoxicillin, clarithromycin) started for 14 days to treat for positive H pylori serology. Pt also had a NG tube placed so we may begin tube feedings. CxR, EKG, and ABG displayed no reason for tachycardia. Pt becomes anxious when seen by different provider teams. Remove art line and consult for midline placement. Continue to wean comfort flow as tolerated. Ensure pt is working with PT/OT/Speech for continuous improvement. Consult psych regarding patient history of bipolar disorder. Pt was seen by psych and they recommended zyprexa 5mg QHS and restarting lithium therapy once able to tolerate oral meds. Patient to continue work with PT/OT/Speech. Weaned to NC 4 L, sats are appropriate. Continue to endorse coughing of secretions and building strength so NG tube can be removed.

## 2023-07-05 NOTE — SUBJECTIVE & OBJECTIVE
Interval History/Significant Events: see above    Review of Systems   Unable to perform ROS: Patient unresponsive   Objective:     Vital Signs (Most Recent):  Temp: (S) (!) 94.6 °F (34.8 °C) (MD notified) (07/05/23 1349)  Pulse: 65 (07/05/23 1757)  Resp: (!) 24 (07/05/23 1757)  BP: (!) 184/72 (07/05/23 1600)  SpO2: 100 % (07/05/23 1757) Vital Signs (24h Range):  Temp:  [94.6 °F (34.8 °C)-98.7 °F (37.1 °C)] 94.6 °F (34.8 °C)  Pulse:  [] 65  Resp:  [5-32] 24  SpO2:  [68 %-100 %] 100 %  BP: ()/(27-90) 184/72  Arterial Line BP: (131-138)/(63-69) 131/69   Weight: 44.7 kg (98 lb 8.7 oz)  Body mass index is 18.02 kg/m².      Intake/Output Summary (Last 24 hours) at 7/5/2023 1839  Last data filed at 7/5/2023 1820  Gross per 24 hour   Intake 4659.27 ml   Output 290 ml   Net 4369.27 ml          Physical Exam  Constitutional:       Appearance: She is ill-appearing and toxic-appearing.      Interventions: She is intubated.   HENT:      Head: Normocephalic.   Eyes:      General: Scleral icterus present.   Neck:      Thyroid: No thyroid mass or thyroid tenderness.      Vascular: No carotid bruit or hepatojugular reflux.   Cardiovascular:      Rate and Rhythm: Normal rate.      Pulses: Decreased pulses.      Heart sounds: Heart sounds are distant.   Pulmonary:      Effort: She is intubated.   Chest:      Chest wall: No mass, lacerations or deformity.   Breasts:     Right: No swelling.      Left: No swelling.   Abdominal:      General: Abdomen is flat.      Palpations: Abdomen is rigid.      Hernia: No hernia is present.   Genitourinary:     Vagina: Normal.      Comments: Copius amount of bright blood exiting rectum   Musculoskeletal:      Cervical back: Rigidity present.   Lymphadenopathy:      Cervical: No cervical adenopathy.   Skin:     Coloration: Skin is jaundiced.      Findings: Ecchymosis present.   Neurological:      Mental Status: She is unresponsive.          Vents:  Vent Mode: A/C (07/05/23 3169)  Ventilator  Initiated: Yes (07/05/23 1221)  Set Rate: 30 BPM (07/05/23 1757)  Vt Set: 300 mL (07/05/23 1757)  PEEP/CPAP: 5 cmH20 (07/05/23 1757)  Oxygen Concentration (%): 50 (07/05/23 1757)  Peak Airway Pressure: 27 cmH20 (07/05/23 1757)  Plateau Pressure: 0 cmH20 (07/05/23 1757)  Total Ve: 10.6 L/m (07/05/23 1757)  Negative Inspiratory Force (cm H2O): 0 (07/05/23 1757)  F/VT Ratio<105 (RSBI): (!) 74.3 (07/05/23 1757)  Lines/Drains/Airways       Central Venous Catheter Line  Duration             Percutaneous Central Line Insertion/Assessment - Triple Lumen  07/05/23 1122 Internal Jugular Left <1 day              Drain  Duration             Female External Urinary Catheter 06/22/23 0801 13 days         Urethral Catheter 07/05/23 1332 Double-lumen <1 day              Airway  Duration                  Airway - Non-Surgical 07/05/23 1220 Endotracheal Tube <1 day              Arterial Line  Duration             Arterial Line 07/05/23 1603 Right Radial <1 day              Peripheral Intravenous Line  Duration                  Peripheral IV - Single Lumen 07/05/23 1056 20 G Anterior;Left Forearm <1 day         Peripheral IV - Single Lumen 07/05/23 1056 22 G Anterior;Left Hand <1 day         Peripheral IV - Single Lumen 07/05/23 1358 20 G Anterior;Right Foot <1 day                  Significant Labs:    CBC/Anemia Profile:  Recent Labs   Lab 07/05/23  1050 07/05/23  1503 07/05/23  1619   WBC 9.01 11.41 12.15   HGB 6.8* 4.6* 3.9*   HCT 22.8* 15.0* 12.3*   PLT 93* 49* 42*   * 100* 98   RDW SEE COMMENT 17.8* 18.2*        Chemistries:  Recent Labs   Lab 07/05/23  1050 07/05/23  1503    139   K 6.6* 4.8   * 109   CO2 19* 21*   BUN 28* 30*   CREATININE 0.5 0.5   CALCIUM 8.5* 7.1*   ALBUMIN 2.2* 1.4*   PROT 5.1* 2.8*   BILITOT 6.2* 3.2*   ALKPHOS 90 57   ALT 13 11   AST 33 36   MG  --  1.4*   PHOS  --  3.7       All pertinent labs within the past 24 hours have been reviewed.    Significant Imaging:  I have reviewed all  pertinent imaging results/findings within the past 24 hours.

## 2023-07-05 NOTE — H&P
[facility]  Critical Care Medicine  Progress Note    Patient Name: Marely Hamilton  MRN: [id number]  Admission Date: 7/5/2023  Hospital Length of Stay: 0 days  Code Status: Full Code  Attending Provider: Bubba David MD  Primary Care Provider: Viktor Ross MD   Principal Problem: <principal problem not specified>    Subjective:     HPI:  Marely Hamilton is a 58 y.o. female with history of alcoholic cirrhosis, seizure disorder, DVT and IJ thrombus with recent admission for large retroperitoneal hemorrhage requiring IR embolization and IVC filter placement who presents for hematochezia. Onset this morning at SNF, alida blood per rectum per chart, sent to ED. Initially normotensive but then severe hypotension prompted initiation of levophed and intubation. Hgb 6.8, 2u pRBC given + 1u FFP ordered. Hgb 8.5 on 7/2. On levo and vaso currently. K 6.6 but renal function at baseline. Repeat pending. No prior EGDs on record.      Hospital/ICU Course:  Patient was seen at bedside, receiving 4 pRBCs and 2 FFP. Previously received 2 pRBCS in the ED. Alida bright red blood was seen under the patient and seemed to be exiting from the rectum. Pt is non-responsive. GI consulted and indicated pt is too unstable for scoping indicating if her condition stabilizes after the transfusion they may carry on with the procedure.       Interval History/Significant Events: see above    Review of Systems   Unable to perform ROS: Patient unresponsive   Objective:     Vital Signs (Most Recent):  Temp: (S) (!) 94.6 °F (34.8 °C) (MD notified) (07/05/23 1349)  Pulse: 65 (07/05/23 1757)  Resp: (!) 24 (07/05/23 1757)  BP: (!) 184/72 (07/05/23 1600)  SpO2: 100 % (07/05/23 1757) Vital Signs (24h Range):  Temp:  [94.6 °F (34.8 °C)-98.7 °F (37.1 °C)] 94.6 °F (34.8 °C)  Pulse:  [] 65  Resp:  [5-32] 24  SpO2:  [68 %-100 %] 100 %  BP: ()/(27-90) 184/72  Arterial Line BP: (131-138)/(63-69) 131/69   Weight: 44.7 kg (98 lb 8.7  oz)  Body mass index is 18.02 kg/m².      Intake/Output Summary (Last 24 hours) at 7/5/2023 1839  Last data filed at 7/5/2023 1820  Gross per 24 hour   Intake 4659.27 ml   Output 290 ml   Net 4369.27 ml          Physical Exam  Constitutional:       Appearance: She is ill-appearing and toxic-appearing.      Interventions: She is intubated.   HENT:      Head: Normocephalic.   Eyes:      General: Scleral icterus present.   Neck:      Thyroid: No thyroid mass or thyroid tenderness.      Vascular: No carotid bruit or hepatojugular reflux.   Cardiovascular:      Rate and Rhythm: Normal rate.      Pulses: Decreased pulses.      Heart sounds: Heart sounds are distant.   Pulmonary:      Effort: She is intubated.   Chest:      Chest wall: No mass, lacerations or deformity.   Breasts:     Right: No swelling.      Left: No swelling.   Abdominal:      General: Abdomen is flat.      Palpations: Abdomen is rigid.      Hernia: No hernia is present.   Genitourinary:     Vagina: Normal.      Comments: Copius amount of bright blood exiting rectum   Musculoskeletal:      Cervical back: Rigidity present.   Lymphadenopathy:      Cervical: No cervical adenopathy.   Skin:     Coloration: Skin is jaundiced.      Findings: Ecchymosis present.   Neurological:      Mental Status: She is unresponsive.          Vents:  Vent Mode: A/C (07/05/23 1757)  Ventilator Initiated: Yes (07/05/23 1221)  Set Rate: 30 BPM (07/05/23 1757)  Vt Set: 300 mL (07/05/23 1757)  PEEP/CPAP: 5 cmH20 (07/05/23 1757)  Oxygen Concentration (%): 50 (07/05/23 1757)  Peak Airway Pressure: 27 cmH20 (07/05/23 1757)  Plateau Pressure: 0 cmH20 (07/05/23 1757)  Total Ve: 10.6 L/m (07/05/23 1757)  Negative Inspiratory Force (cm H2O): 0 (07/05/23 1757)  F/VT Ratio<105 (RSBI): (!) 74.3 (07/05/23 1757)  Lines/Drains/Airways       Central Venous Catheter Line  Duration             Percutaneous Central Line Insertion/Assessment - Triple Lumen  07/05/23 1122 Internal Jugular Left <1 day               Drain  Duration             Female External Urinary Catheter 06/22/23 0801 13 days         Urethral Catheter 07/05/23 1332 Double-lumen <1 day              Airway  Duration                  Airway - Non-Surgical 07/05/23 1220 Endotracheal Tube <1 day              Arterial Line  Duration             Arterial Line 07/05/23 1603 Right Radial <1 day              Peripheral Intravenous Line  Duration                  Peripheral IV - Single Lumen 07/05/23 1056 20 G Anterior;Left Forearm <1 day         Peripheral IV - Single Lumen 07/05/23 1056 22 G Anterior;Left Hand <1 day         Peripheral IV - Single Lumen 07/05/23 1358 20 G Anterior;Right Foot <1 day                  Significant Labs:    CBC/Anemia Profile:  Recent Labs   Lab 07/05/23  1050 07/05/23  1503 07/05/23  1619   WBC 9.01 11.41 12.15   HGB 6.8* 4.6* 3.9*   HCT 22.8* 15.0* 12.3*   PLT 93* 49* 42*   * 100* 98   RDW SEE COMMENT 17.8* 18.2*        Chemistries:  Recent Labs   Lab 07/05/23  1050 07/05/23  1503    139   K 6.6* 4.8   * 109   CO2 19* 21*   BUN 28* 30*   CREATININE 0.5 0.5   CALCIUM 8.5* 7.1*   ALBUMIN 2.2* 1.4*   PROT 5.1* 2.8*   BILITOT 6.2* 3.2*   ALKPHOS 90 57   ALT 13 11   AST 33 36   MG  --  1.4*   PHOS  --  3.7       All pertinent labs within the past 24 hours have been reviewed.    Significant Imaging:  I have reviewed all pertinent imaging results/findings within the past 24 hours.      ABG  Recent Labs   Lab 07/05/23  1501   PH 7.271*   PO2 44   PCO2 41.4   HCO3 19.1*   BE -8     Assessment/Plan:     Oncology  Acute blood loss anemia  - Maintain IV access with 2 large bore Ivs  - Intravascular resuscitation/support with IVFs   - Hold all NSAIDs and anticoagulants, unless contraindicated.  - Please correct any coagulopathy with platelets and FFP for goal of platelets >50K and INR <2.0  - Please notify GI team if there is significant change in patient's clinical status         Other  Retroperitoneal bleed  --  CTA f/u, if no active extravasation follow with GI for plan for scoping       Critical Care Daily Checklist:    A: Awake: RASS Goal/Actual Goal:    Actual:     B: Spontaneous Breathing Trial Performed?     C: SAT & SBT Coordinated?  n/a                      D: Delirium: CAM-ICU     E: Early Mobility Performed? No   F: Feeding Goal:    Status:     Current Diet Order   Procedures    Diet NPO      AS: Analgesia/Sedation Propofol and dilaudid   T: Thromboembolic Prophylaxis n/a   H: HOB > 300 Yes   U: Stress Ulcer Prophylaxis (if needed) pantoprazole   G: Glucose Control n/a   B: Bowel Function     I: Indwelling Catheter (Lines & Baugh) Necessity baugh   D: De-escalation of Antimicrobials/Pharmacotherapies n/a    Plan for the day/ETD Stabilize pt    Code Status:  Family/Goals of Care: Full Code  n/a       Critical secondary to Patient has a condition that poses threat to life and bodily function: Hemorrhagic shock      Critical care was time spent personally by me on the following activities: development of treatment plan with patient or surrogate and bedside caregivers, discussions with consultants, evaluation of patient's response to treatment, examination of patient, ordering and performing treatments and interventions, ordering and review of laboratory studies, ordering and review of radiographic studies, pulse oximetry, re-evaluation of patient's condition. This critical care time did not overlap with that of any other provider or involve time for any procedures.     Phong Jean,   Critical Care Medicine  [facility]

## 2023-07-05 NOTE — CARE UPDATE
Pt intubated for severe hypotension with a 7.5 ETT secured 23cm at the lip. Pt placed on the vent at the documented settings. Will continue to monitor.

## 2023-07-05 NOTE — H&P
[facility]  Critical Care Medicine  Progress Note    Patient Name: Marely Hamilton  MRN: [id number]  Admission Date: 7/5/2023  Hospital Length of Stay: 0 days  Code Status: Full Code  Attending Provider: Bubba David MD  Primary Care Provider: Viktor Ross MD   Principal Problem: <principal problem not specified>    Subjective:     HPI:  Marely Hamilton is a 58 y.o. female with history of alcoholic cirrhosis, seizure disorder, DVT and IJ thrombus with recent admission for large retroperitoneal hemorrhage requiring IR embolization and IVC filter placement who presents for hematochezia. Onset this morning at SNF, alida blood per rectum per chart, sent to ED. Initially normotensive but then severe hypotension prompted initiation of levophed and intubation. Hgb 6.8, 2u pRBC given + 1u FFP ordered. Hgb 8.5 on 7/2. On levo and vaso currently. K 6.6 but renal function at baseline. Repeat pending. No prior EGDs on record.      Hospital/ICU Course:  Patient was seen at bedside, receiving 4 pRBCs and 2 FFP. Previously received 2 pRBCS in the ED. Alida bright red blood was seen under the patient and seemed to be exiting from the rectum. Pt is non-responsive. GI consulted and indicated pt is too unstable for scoping indicating if her condition stabilizes after the transfusion they may carry on with the procedure.       No new subjective & objective note has been filed under this hospital service since the last note was generated.      ABG  Recent Labs   Lab 07/05/23  1501   PH 7.271*   PO2 44   PCO2 41.4   HCO3 19.1*   BE -8     Assessment/Plan:     Oncology  Acute blood loss anemia  - Maintain IV access with 2 large bore Ivs  - Intravascular resuscitation/support with IVFs   - Hold all NSAIDs and anticoagulants, unless contraindicated.  - Please correct any coagulopathy with platelets and FFP for goal of platelets >50K and INR <2.0  - Please notify GI team if there is significant change in patient's  clinical status         Other  Retroperitoneal bleed  -- CTA f/u, if no active extravasation follow with GI for plan for scoping       Critical Care Daily Checklist:    A: Awake: RASS Goal/Actual Goal:    Actual:     B: Spontaneous Breathing Trial Performed?     C: SAT & SBT Coordinated?  n/a                      D: Delirium: CAM-ICU     E: Early Mobility Performed? Yes   F: Feeding Goal:    Status:     Current Diet Order   Procedures    Diet NPO      AS: Analgesia/Sedation Propofol and dilaudid   T: Thromboembolic Prophylaxis n/a   H: HOB > 300 Yes   U: Stress Ulcer Prophylaxis (if needed) pantoprazole   G: Glucose Control n/a   B: Bowel Function     I: Indwelling Catheter (Lines & Baugh) Necessity baugh   D: De-escalation of Antimicrobials/Pharmacotherapies n/a    Plan for the day/ETD Stabilize pt    Code Status:  Family/Goals of Care: Full Code  n/a       Critical secondary to Patient has a condition that poses threat to life and bodily function: Hemorraghic shock      Critical care was time spent personally by me on the following activities: development of treatment plan with patient or surrogate and bedside caregivers, discussions with consultants, evaluation of patient's response to treatment, examination of patient, ordering and performing treatments and interventions, ordering and review of laboratory studies, ordering and review of radiographic studies, pulse oximetry, re-evaluation of patient's condition. This critical care time did not overlap with that of any other provider or involve time for any procedures.     Phong Jean, DO  Critical Care Medicine  [facility]

## 2023-07-05 NOTE — CONSULTS
Consult received, patient admitted to MICU.    Please see H&P.    Israel Lau M.D.  [facility]

## 2023-07-05 NOTE — ASSESSMENT & PLAN NOTE
- Maintain IV access with 2 large bore Ivs  - Intravascular resuscitation/support with IVFs   - Hold all NSAIDs and anticoagulants, unless contraindicated.  - Please correct any coagulopathy with platelets and FFP for goal of platelets >50K and INR <2.0  - Please notify GI team if there is significant change in patient's clinical status

## 2023-07-06 LAB
ALBUMIN SERPL BCP-MCNC: 2.1 G/DL (ref 3.5–5.2)
ALBUMIN SERPL BCP-MCNC: 2.4 G/DL (ref 3.5–5.2)
ALLENS TEST: ABNORMAL
ALP SERPL-CCNC: 45 U/L (ref 55–135)
ALP SERPL-CCNC: 52 U/L (ref 55–135)
ALT SERPL W/O P-5'-P-CCNC: 15 U/L (ref 10–44)
ALT SERPL W/O P-5'-P-CCNC: 17 U/L (ref 10–44)
ANION GAP SERPL CALC-SCNC: 8 MMOL/L (ref 8–16)
ANION GAP SERPL CALC-SCNC: 8 MMOL/L (ref 8–16)
APTT PPP: 27.8 SEC (ref 21–32)
APTT PPP: 28.7 SEC (ref 21–32)
APTT PPP: 31.3 SEC (ref 21–32)
APTT PPP: 37.2 SEC (ref 21–32)
AST SERPL-CCNC: 34 U/L (ref 10–40)
AST SERPL-CCNC: 35 U/L (ref 10–40)
BASOPHILS # BLD AUTO: 0.01 K/UL (ref 0–0.2)
BASOPHILS # BLD AUTO: 0.01 K/UL (ref 0–0.2)
BASOPHILS # BLD AUTO: 0.02 K/UL (ref 0–0.2)
BASOPHILS NFR BLD: 0.2 % (ref 0–1.9)
BASOPHILS NFR BLD: 0.2 % (ref 0–1.9)
BASOPHILS NFR BLD: 0.4 % (ref 0–1.9)
BASOPHILS NFR BLD: 0.4 % (ref 0–1.9)
BASOPHILS NFR BLD: 0.5 % (ref 0–1.9)
BASOPHILS NFR BLD: 0.5 % (ref 0–1.9)
BILIRUB SERPL-MCNC: 4 MG/DL (ref 0.1–1)
BILIRUB SERPL-MCNC: 4.6 MG/DL (ref 0.1–1)
BLD PROD TYP BPU: NORMAL
BLOOD UNIT EXPIRATION DATE: NORMAL
BLOOD UNIT TYPE CODE: 5100
BLOOD UNIT TYPE CODE: 6200
BLOOD UNIT TYPE CODE: 9500
BLOOD UNIT TYPE CODE: 9500
BLOOD UNIT TYPE: NORMAL
BUN SERPL-MCNC: 28 MG/DL (ref 6–20)
BUN SERPL-MCNC: 34 MG/DL (ref 6–20)
CA-I BLDV-SCNC: 1.14 MMOL/L (ref 1.06–1.42)
CALCIUM SERPL-MCNC: 7.6 MG/DL (ref 8.7–10.5)
CALCIUM SERPL-MCNC: 7.9 MG/DL (ref 8.7–10.5)
CHLORIDE SERPL-SCNC: 111 MMOL/L (ref 95–110)
CHLORIDE SERPL-SCNC: 113 MMOL/L (ref 95–110)
CO2 SERPL-SCNC: 20 MMOL/L (ref 23–29)
CO2 SERPL-SCNC: 22 MMOL/L (ref 23–29)
CODING SYSTEM: NORMAL
CREAT SERPL-MCNC: 0.5 MG/DL (ref 0.5–1.4)
CREAT SERPL-MCNC: 0.6 MG/DL (ref 0.5–1.4)
CROSSMATCH INTERPRETATION: NORMAL
DELSYS: ABNORMAL
DIFFERENTIAL METHOD: ABNORMAL
DISPENSE STATUS: NORMAL
EOSINOPHIL # BLD AUTO: 0.1 K/UL (ref 0–0.5)
EOSINOPHIL # BLD AUTO: 0.2 K/UL (ref 0–0.5)
EOSINOPHIL # BLD AUTO: 0.2 K/UL (ref 0–0.5)
EOSINOPHIL NFR BLD: 1.1 % (ref 0–8)
EOSINOPHIL NFR BLD: 2.2 % (ref 0–8)
EOSINOPHIL NFR BLD: 3 % (ref 0–8)
EOSINOPHIL NFR BLD: 3 % (ref 0–8)
EOSINOPHIL NFR BLD: 3.3 % (ref 0–8)
EOSINOPHIL NFR BLD: 3.5 % (ref 0–8)
ERYTHROCYTE [DISTWIDTH] IN BLOOD BY AUTOMATED COUNT: 14.8 % (ref 11.5–14.5)
ERYTHROCYTE [DISTWIDTH] IN BLOOD BY AUTOMATED COUNT: 15 % (ref 11.5–14.5)
ERYTHROCYTE [DISTWIDTH] IN BLOOD BY AUTOMATED COUNT: 15 % (ref 11.5–14.5)
ERYTHROCYTE [DISTWIDTH] IN BLOOD BY AUTOMATED COUNT: 15.1 % (ref 11.5–14.5)
ERYTHROCYTE [DISTWIDTH] IN BLOOD BY AUTOMATED COUNT: 15.5 % (ref 11.5–14.5)
ERYTHROCYTE [DISTWIDTH] IN BLOOD BY AUTOMATED COUNT: 15.6 % (ref 11.5–14.5)
ERYTHROCYTE [SEDIMENTATION RATE] IN BLOOD BY WESTERGREN METHOD: 30 MM/H
EST. GFR  (NO RACE VARIABLE): >60 ML/MIN/1.73 M^2
EST. GFR  (NO RACE VARIABLE): >60 ML/MIN/1.73 M^2
FIBRINOGEN PPP-MCNC: 186 MG/DL (ref 182–400)
FIBRINOGEN PPP-MCNC: 188 MG/DL (ref 182–400)
FIBRINOGEN PPP-MCNC: 89 MG/DL (ref 182–400)
FIO2: 100
FIO2: 40
FIO2: 50
GLUCOSE SERPL-MCNC: 136 MG/DL (ref 70–110)
GLUCOSE SERPL-MCNC: 182 MG/DL (ref 70–110)
HCO3 UR-SCNC: 18.4 MMOL/L (ref 24–28)
HCO3 UR-SCNC: 19.6 MMOL/L (ref 24–28)
HCO3 UR-SCNC: 20.3 MMOL/L (ref 24–28)
HCT VFR BLD AUTO: 20.8 % (ref 37–48.5)
HCT VFR BLD AUTO: 21.9 % (ref 37–48.5)
HCT VFR BLD AUTO: 22.5 % (ref 37–48.5)
HCT VFR BLD AUTO: 23.1 % (ref 37–48.5)
HCT VFR BLD AUTO: 23.8 % (ref 37–48.5)
HCT VFR BLD AUTO: 25.1 % (ref 37–48.5)
HCT VFR BLD CALC: 21 %PCV (ref 36–54)
HCT VFR BLD CALC: <15 %PCV (ref 36–54)
HCT VFR BLD CALC: <15 %PCV (ref 36–54)
HGB BLD-MCNC: 6.9 G/DL (ref 12–16)
HGB BLD-MCNC: 7.3 G/DL (ref 12–16)
HGB BLD-MCNC: 7.6 G/DL (ref 12–16)
HGB BLD-MCNC: 7.9 G/DL (ref 12–16)
HGB BLD-MCNC: 7.9 G/DL (ref 12–16)
HGB BLD-MCNC: 8.6 G/DL (ref 12–16)
IMM GRANULOCYTES # BLD AUTO: 0.02 K/UL (ref 0–0.04)
IMM GRANULOCYTES # BLD AUTO: 0.03 K/UL (ref 0–0.04)
IMM GRANULOCYTES # BLD AUTO: 0.03 K/UL (ref 0–0.04)
IMM GRANULOCYTES # BLD AUTO: 0.04 K/UL (ref 0–0.04)
IMM GRANULOCYTES NFR BLD AUTO: 0.4 % (ref 0–0.5)
IMM GRANULOCYTES NFR BLD AUTO: 0.5 % (ref 0–0.5)
IMM GRANULOCYTES NFR BLD AUTO: 0.5 % (ref 0–0.5)
IMM GRANULOCYTES NFR BLD AUTO: 0.6 % (ref 0–0.5)
IMM GRANULOCYTES NFR BLD AUTO: 0.6 % (ref 0–0.5)
IMM GRANULOCYTES NFR BLD AUTO: 0.7 % (ref 0–0.5)
INR PPP: 1.3 (ref 0.8–1.2)
INR PPP: 1.3 (ref 0.8–1.2)
INR PPP: 1.4 (ref 0.8–1.2)
INR PPP: 1.8 (ref 0.8–1.2)
LACTATE SERPL-SCNC: 1.5 MMOL/L (ref 0.5–2.2)
LDH SERPL L TO P-CCNC: 2.99 MMOL/L (ref 0.36–1.25)
LYMPHOCYTES # BLD AUTO: 0.8 K/UL (ref 1–4.8)
LYMPHOCYTES # BLD AUTO: 0.9 K/UL (ref 1–4.8)
LYMPHOCYTES # BLD AUTO: 1 K/UL (ref 1–4.8)
LYMPHOCYTES # BLD AUTO: 1.1 K/UL (ref 1–4.8)
LYMPHOCYTES NFR BLD: 16.5 % (ref 18–48)
LYMPHOCYTES NFR BLD: 17.8 % (ref 18–48)
LYMPHOCYTES NFR BLD: 19 % (ref 18–48)
LYMPHOCYTES NFR BLD: 22.9 % (ref 18–48)
LYMPHOCYTES NFR BLD: 23.2 % (ref 18–48)
LYMPHOCYTES NFR BLD: 24.9 % (ref 18–48)
MAGNESIUM SERPL-MCNC: 1.8 MG/DL (ref 1.6–2.6)
MAGNESIUM SERPL-MCNC: 1.9 MG/DL (ref 1.6–2.6)
MAGNESIUM SERPL-MCNC: 1.9 MG/DL (ref 1.6–2.6)
MCH RBC QN AUTO: 28.3 PG (ref 27–31)
MCH RBC QN AUTO: 28.8 PG (ref 27–31)
MCH RBC QN AUTO: 29.3 PG (ref 27–31)
MCH RBC QN AUTO: 29.5 PG (ref 27–31)
MCHC RBC AUTO-ENTMCNC: 33.2 G/DL (ref 32–36)
MCHC RBC AUTO-ENTMCNC: 33.2 G/DL (ref 32–36)
MCHC RBC AUTO-ENTMCNC: 33.3 G/DL (ref 32–36)
MCHC RBC AUTO-ENTMCNC: 33.8 G/DL (ref 32–36)
MCHC RBC AUTO-ENTMCNC: 34.2 G/DL (ref 32–36)
MCHC RBC AUTO-ENTMCNC: 34.3 G/DL (ref 32–36)
MCV RBC AUTO: 85 FL (ref 82–98)
MCV RBC AUTO: 86 FL (ref 82–98)
MCV RBC AUTO: 86 FL (ref 82–98)
MCV RBC AUTO: 87 FL (ref 82–98)
MCV RBC AUTO: 87 FL (ref 82–98)
MCV RBC AUTO: 88 FL (ref 82–98)
MIN VOL: 9
MODE: ABNORMAL
MONOCYTES # BLD AUTO: 0.4 K/UL (ref 0.3–1)
MONOCYTES # BLD AUTO: 0.4 K/UL (ref 0.3–1)
MONOCYTES # BLD AUTO: 0.5 K/UL (ref 0.3–1)
MONOCYTES # BLD AUTO: 0.6 K/UL (ref 0.3–1)
MONOCYTES NFR BLD: 10.4 % (ref 4–15)
MONOCYTES NFR BLD: 10.5 % (ref 4–15)
MONOCYTES NFR BLD: 12.2 % (ref 4–15)
MONOCYTES NFR BLD: 7.6 % (ref 4–15)
MONOCYTES NFR BLD: 9.5 % (ref 4–15)
MONOCYTES NFR BLD: 9.9 % (ref 4–15)
NEUTROPHILS # BLD AUTO: 2.5 K/UL (ref 1.8–7.7)
NEUTROPHILS # BLD AUTO: 2.7 K/UL (ref 1.8–7.7)
NEUTROPHILS # BLD AUTO: 2.8 K/UL (ref 1.8–7.7)
NEUTROPHILS # BLD AUTO: 3 K/UL (ref 1.8–7.7)
NEUTROPHILS # BLD AUTO: 3.8 K/UL (ref 1.8–7.7)
NEUTROPHILS # BLD AUTO: 3.9 K/UL (ref 1.8–7.7)
NEUTROPHILS NFR BLD: 59.7 % (ref 38–73)
NEUTROPHILS NFR BLD: 62.2 % (ref 38–73)
NEUTROPHILS NFR BLD: 63.4 % (ref 38–73)
NEUTROPHILS NFR BLD: 67 % (ref 38–73)
NEUTROPHILS NFR BLD: 69.8 % (ref 38–73)
NEUTROPHILS NFR BLD: 71.9 % (ref 38–73)
NRBC BLD-RTO: 0 /100 WBC
NUM UNITS TRANS FFP: NORMAL
PCO2 BLDA: 25.7 MMHG (ref 35–45)
PCO2 BLDA: 31.6 MMHG (ref 35–45)
PCO2 BLDA: 33.6 MMHG (ref 35–45)
PEEP: 5
PH SMN: 7.37 [PH] (ref 7.35–7.45)
PH SMN: 7.38 [PH] (ref 7.35–7.45)
PH SMN: 7.5 [PH] (ref 7.35–7.45)
PHOSPHATE SERPL-MCNC: 2 MG/DL (ref 2.7–4.5)
PHOSPHATE SERPL-MCNC: 2.3 MG/DL (ref 2.7–4.5)
PIP: 27
PLATELET # BLD AUTO: 42 K/UL (ref 150–450)
PLATELET # BLD AUTO: 53 K/UL (ref 150–450)
PLATELET # BLD AUTO: 59 K/UL (ref 150–450)
PLATELET # BLD AUTO: 65 K/UL (ref 150–450)
PLATELET # BLD AUTO: 69 K/UL (ref 150–450)
PLATELET # BLD AUTO: 78 K/UL (ref 150–450)
PMV BLD AUTO: 10 FL (ref 9.2–12.9)
PMV BLD AUTO: 10.3 FL (ref 9.2–12.9)
PMV BLD AUTO: 11 FL (ref 9.2–12.9)
PMV BLD AUTO: 11.3 FL (ref 9.2–12.9)
PMV BLD AUTO: 9.9 FL (ref 9.2–12.9)
PMV BLD AUTO: 9.9 FL (ref 9.2–12.9)
PO2 BLDA: 103 MMHG (ref 80–100)
PO2 BLDA: 158 MMHG (ref 80–100)
PO2 BLDA: 425 MMHG (ref 80–100)
POC BE: -3 MMOL/L
POC BE: -6 MMOL/L
POC BE: -7 MMOL/L
POC IONIZED CALCIUM: 0.93 MMOL/L (ref 1.06–1.42)
POC IONIZED CALCIUM: 1.06 MMOL/L (ref 1.06–1.42)
POC IONIZED CALCIUM: 1.13 MMOL/L (ref 1.06–1.42)
POC SATURATED O2: 100 % (ref 95–100)
POC SATURATED O2: 99 % (ref 95–100)
POC SATURATED O2: 99 % (ref 95–100)
POC TCO2: 19 MMOL/L (ref 23–27)
POC TCO2: 21 MMOL/L (ref 23–27)
POC TCO2: 21 MMOL/L (ref 23–27)
POCT GLUCOSE: 124 MG/DL (ref 70–110)
POCT GLUCOSE: 137 MG/DL (ref 70–110)
POCT GLUCOSE: 142 MG/DL (ref 70–110)
POCT GLUCOSE: 153 MG/DL (ref 70–110)
POCT GLUCOSE: 160 MG/DL (ref 70–110)
POCT GLUCOSE: 210 MG/DL (ref 70–110)
POCT GLUCOSE: 238 MG/DL (ref 70–110)
POTASSIUM BLD-SCNC: 4.1 MMOL/L (ref 3.5–5.1)
POTASSIUM BLD-SCNC: 4.9 MMOL/L (ref 3.5–5.1)
POTASSIUM BLD-SCNC: 5.3 MMOL/L (ref 3.5–5.1)
POTASSIUM SERPL-SCNC: 3.3 MMOL/L (ref 3.5–5.1)
POTASSIUM SERPL-SCNC: 3.8 MMOL/L (ref 3.5–5.1)
PROT SERPL-MCNC: 3.8 G/DL (ref 6–8.4)
PROT SERPL-MCNC: 4.5 G/DL (ref 6–8.4)
PROTHROMBIN TIME: 13.5 SEC (ref 9–12.5)
PROTHROMBIN TIME: 14.2 SEC (ref 9–12.5)
PROTHROMBIN TIME: 15.1 SEC (ref 9–12.5)
PROTHROMBIN TIME: 18.2 SEC (ref 9–12.5)
RBC # BLD AUTO: 2.44 M/UL (ref 4–5.4)
RBC # BLD AUTO: 2.49 M/UL (ref 4–5.4)
RBC # BLD AUTO: 2.59 M/UL (ref 4–5.4)
RBC # BLD AUTO: 2.7 M/UL (ref 4–5.4)
RBC # BLD AUTO: 2.74 M/UL (ref 4–5.4)
RBC # BLD AUTO: 2.92 M/UL (ref 4–5.4)
SAMPLE: ABNORMAL
SITE: ABNORMAL
SODIUM BLD-SCNC: 139 MMOL/L (ref 136–145)
SODIUM BLD-SCNC: 140 MMOL/L (ref 136–145)
SODIUM BLD-SCNC: 144 MMOL/L (ref 136–145)
SODIUM SERPL-SCNC: 141 MMOL/L (ref 136–145)
SODIUM SERPL-SCNC: 141 MMOL/L (ref 136–145)
SP02: 100
TRANS ERYTHROCYTES VOL PATIENT: NORMAL ML
TRANS ERYTHROCYTES VOL PATIENT: NORMAL ML
UNIT NUMBER: NORMAL
VT: 300
WBC # BLD AUTO: 4 K/UL (ref 3.9–12.7)
WBC # BLD AUTO: 4.18 K/UL (ref 3.9–12.7)
WBC # BLD AUTO: 4.48 K/UL (ref 3.9–12.7)
WBC # BLD AUTO: 4.67 K/UL (ref 3.9–12.7)
WBC # BLD AUTO: 5.28 K/UL (ref 3.9–12.7)
WBC # BLD AUTO: 5.56 K/UL (ref 3.9–12.7)

## 2023-07-06 PROCEDURE — P9017 PLASMA 1 DONOR FRZ W/IN 8 HR: HCPCS | Performed by: NURSE PRACTITIONER

## 2023-07-06 PROCEDURE — 63600175 PHARM REV CODE 636 W HCPCS: Performed by: INTERNAL MEDICINE

## 2023-07-06 PROCEDURE — 80053 COMPREHEN METABOLIC PANEL: CPT | Mod: 91 | Performed by: INTERNAL MEDICINE

## 2023-07-06 PROCEDURE — 43235 EGD DIAGNOSTIC BRUSH WASH: CPT | Mod: ,,, | Performed by: INTERNAL MEDICINE

## 2023-07-06 PROCEDURE — P9035 PLATELET PHERES LEUKOREDUCED: HCPCS

## 2023-07-06 PROCEDURE — 85384 FIBRINOGEN ACTIVITY: CPT

## 2023-07-06 PROCEDURE — 86965 POOLING BLOOD PLATELETS: CPT | Performed by: NURSE PRACTITIONER

## 2023-07-06 PROCEDURE — 25000003 PHARM REV CODE 250: Performed by: STUDENT IN AN ORGANIZED HEALTH CARE EDUCATION/TRAINING PROGRAM

## 2023-07-06 PROCEDURE — 43235 EGD DIAGNOSTIC BRUSH WASH: CPT | Performed by: INTERNAL MEDICINE

## 2023-07-06 PROCEDURE — 82330 ASSAY OF CALCIUM: CPT

## 2023-07-06 PROCEDURE — 83735 ASSAY OF MAGNESIUM: CPT

## 2023-07-06 PROCEDURE — 94761 N-INVAS EAR/PLS OXIMETRY MLT: CPT

## 2023-07-06 PROCEDURE — 25000003 PHARM REV CODE 250: Performed by: EMERGENCY MEDICINE

## 2023-07-06 PROCEDURE — P9012 CRYOPRECIPITATE EACH UNIT: HCPCS | Performed by: NURSE PRACTITIONER

## 2023-07-06 PROCEDURE — 84132 ASSAY OF SERUM POTASSIUM: CPT

## 2023-07-06 PROCEDURE — D9220A PRA ANESTHESIA: Mod: CRNA,,, | Performed by: NURSE ANESTHETIST, CERTIFIED REGISTERED

## 2023-07-06 PROCEDURE — 85610 PROTHROMBIN TIME: CPT | Mod: 91

## 2023-07-06 PROCEDURE — 84100 ASSAY OF PHOSPHORUS: CPT

## 2023-07-06 PROCEDURE — 37000009 HC ANESTHESIA EA ADD 15 MINS: Performed by: INTERNAL MEDICINE

## 2023-07-06 PROCEDURE — D9220A PRA ANESTHESIA: ICD-10-PCS | Mod: ANES,,, | Performed by: INTERNAL MEDICINE

## 2023-07-06 PROCEDURE — 85610 PROTHROMBIN TIME: CPT | Mod: 91 | Performed by: NURSE PRACTITIONER

## 2023-07-06 PROCEDURE — 27000221 HC OXYGEN, UP TO 24 HOURS

## 2023-07-06 PROCEDURE — D9220A PRA ANESTHESIA: ICD-10-PCS | Mod: CRNA,,, | Performed by: NURSE ANESTHETIST, CERTIFIED REGISTERED

## 2023-07-06 PROCEDURE — 43235 PR EGD, FLEX, DIAGNOSTIC: ICD-10-PCS | Mod: ,,, | Performed by: INTERNAL MEDICINE

## 2023-07-06 PROCEDURE — 37799 UNLISTED PX VASCULAR SURGERY: CPT

## 2023-07-06 PROCEDURE — 63600175 PHARM REV CODE 636 W HCPCS: Performed by: STUDENT IN AN ORGANIZED HEALTH CARE EDUCATION/TRAINING PROGRAM

## 2023-07-06 PROCEDURE — 37000008 HC ANESTHESIA 1ST 15 MINUTES: Performed by: INTERNAL MEDICINE

## 2023-07-06 PROCEDURE — 20000000 HC ICU ROOM

## 2023-07-06 PROCEDURE — 27200997: Performed by: INTERNAL MEDICINE

## 2023-07-06 PROCEDURE — P9021 RED BLOOD CELLS UNIT: HCPCS | Performed by: NURSE PRACTITIONER

## 2023-07-06 PROCEDURE — P9035 PLATELET PHERES LEUKOREDUCED: HCPCS | Performed by: NURSE PRACTITIONER

## 2023-07-06 PROCEDURE — 85730 THROMBOPLASTIN TIME PARTIAL: CPT | Mod: 91 | Performed by: NURSE PRACTITIONER

## 2023-07-06 PROCEDURE — C9113 INJ PANTOPRAZOLE SODIUM, VIA: HCPCS | Performed by: STUDENT IN AN ORGANIZED HEALTH CARE EDUCATION/TRAINING PROGRAM

## 2023-07-06 PROCEDURE — D9220A PRA ANESTHESIA: Mod: ANES,,, | Performed by: INTERNAL MEDICINE

## 2023-07-06 PROCEDURE — P9017 PLASMA 1 DONOR FRZ W/IN 8 HR: HCPCS | Performed by: STUDENT IN AN ORGANIZED HEALTH CARE EDUCATION/TRAINING PROGRAM

## 2023-07-06 PROCEDURE — 25000003 PHARM REV CODE 250: Performed by: INTERNAL MEDICINE

## 2023-07-06 PROCEDURE — 99900035 HC TECH TIME PER 15 MIN (STAT)

## 2023-07-06 PROCEDURE — 63600175 PHARM REV CODE 636 W HCPCS: Mod: JA | Performed by: EMERGENCY MEDICINE

## 2023-07-06 PROCEDURE — 83605 ASSAY OF LACTIC ACID: CPT | Performed by: NURSE PRACTITIONER

## 2023-07-06 PROCEDURE — 94003 VENT MGMT INPAT SUBQ DAY: CPT

## 2023-07-06 PROCEDURE — 36430 TRANSFUSION BLD/BLD COMPNT: CPT

## 2023-07-06 PROCEDURE — 99233 SBSQ HOSP IP/OBS HIGH 50: CPT | Mod: 25,,,

## 2023-07-06 PROCEDURE — 80053 COMPREHEN METABOLIC PANEL: CPT

## 2023-07-06 PROCEDURE — 85384 FIBRINOGEN ACTIVITY: CPT | Mod: 91 | Performed by: NURSE PRACTITIONER

## 2023-07-06 PROCEDURE — 83605 ASSAY OF LACTIC ACID: CPT

## 2023-07-06 PROCEDURE — 85025 COMPLETE CBC W/AUTO DIFF WBC: CPT | Mod: 91

## 2023-07-06 PROCEDURE — 82803 BLOOD GASES ANY COMBINATION: CPT

## 2023-07-06 PROCEDURE — 85014 HEMATOCRIT: CPT

## 2023-07-06 PROCEDURE — 84100 ASSAY OF PHOSPHORUS: CPT | Mod: 91 | Performed by: INTERNAL MEDICINE

## 2023-07-06 PROCEDURE — 85730 THROMBOPLASTIN TIME PARTIAL: CPT | Mod: 91

## 2023-07-06 PROCEDURE — 83735 ASSAY OF MAGNESIUM: CPT | Mod: 91 | Performed by: NURSE PRACTITIONER

## 2023-07-06 PROCEDURE — 99900026 HC AIRWAY MAINTENANCE (STAT)

## 2023-07-06 PROCEDURE — 99233 PR SUBSEQUENT HOSPITAL CARE,LEVL III: ICD-10-PCS | Mod: 25,,,

## 2023-07-06 PROCEDURE — 63600175 PHARM REV CODE 636 W HCPCS

## 2023-07-06 PROCEDURE — 84295 ASSAY OF SERUM SODIUM: CPT

## 2023-07-06 RX ORDER — PROPOFOL 10 MG/ML
INJECTION, EMULSION INTRAVENOUS
Status: DISPENSED
Start: 2023-07-06 | End: 2023-07-07

## 2023-07-06 RX ORDER — MUPIROCIN 20 MG/G
OINTMENT TOPICAL 2 TIMES DAILY
Status: COMPLETED | OUTPATIENT
Start: 2023-07-06 | End: 2023-07-11

## 2023-07-06 RX ORDER — LANOLIN ALCOHOL/MO/W.PET/CERES
CREAM (GRAM) TOPICAL DAILY
Status: ON HOLD | COMMUNITY
End: 2023-08-01

## 2023-07-06 RX ORDER — HYDROCODONE BITARTRATE AND ACETAMINOPHEN 500; 5 MG/1; MG/1
TABLET ORAL
Status: DISCONTINUED | OUTPATIENT
Start: 2023-07-06 | End: 2023-07-07

## 2023-07-06 RX ORDER — SODIUM BICARBONATE 650 MG/1
650 TABLET ORAL DAILY
Status: ON HOLD | COMMUNITY
End: 2023-08-01

## 2023-07-06 RX ORDER — LACTULOSE 10 G/15ML
10 SOLUTION ORAL; RECTAL 3 TIMES DAILY
Status: ON HOLD | COMMUNITY
End: 2023-08-01 | Stop reason: HOSPADM

## 2023-07-06 RX ORDER — QUETIAPINE FUMARATE 300 MG/1
100 TABLET, FILM COATED ORAL NIGHTLY
Status: ON HOLD | COMMUNITY
End: 2023-08-01

## 2023-07-06 RX ORDER — FUROSEMIDE 20 MG/1
20 TABLET ORAL DAILY
Status: ON HOLD | COMMUNITY
End: 2023-08-01

## 2023-07-06 RX ORDER — ZONISAMIDE 100 MG/1
100 CAPSULE ORAL DAILY
Status: ON HOLD | COMMUNITY
End: 2023-08-01

## 2023-07-06 RX ORDER — ARIPIPRAZOLE 20 MG/1
5 TABLET ORAL DAILY
Status: ON HOLD | COMMUNITY
End: 2023-08-01

## 2023-07-06 RX ORDER — LANOLIN ALCOHOL/MO/W.PET/CERES
100 CREAM (GRAM) TOPICAL DAILY
Status: ON HOLD | COMMUNITY
End: 2023-07-06

## 2023-07-06 RX ORDER — PANTOPRAZOLE SODIUM 40 MG/1
40 TABLET, DELAYED RELEASE ORAL DAILY
Status: ON HOLD | COMMUNITY
End: 2023-08-01 | Stop reason: HOSPADM

## 2023-07-06 RX ORDER — MAGNESIUM SULFATE HEPTAHYDRATE 40 MG/ML
2 INJECTION, SOLUTION INTRAVENOUS ONCE
Status: COMPLETED | OUTPATIENT
Start: 2023-07-06 | End: 2023-07-06

## 2023-07-06 RX ADMIN — MAGNESIUM SULFATE 2 G: 2 INJECTION INTRAVENOUS at 05:07

## 2023-07-06 RX ADMIN — MUPIROCIN: 20 OINTMENT TOPICAL at 08:07

## 2023-07-06 RX ADMIN — PANTOPRAZOLE SODIUM 40 MG: 40 INJECTION, POWDER, FOR SOLUTION INTRAVENOUS at 08:07

## 2023-07-06 RX ADMIN — OCTREOTIDE ACETATE 50 MCG/HR: 500 INJECTION, SOLUTION INTRAVENOUS; SUBCUTANEOUS at 07:07

## 2023-07-06 RX ADMIN — VASOPRESSIN 0.04 UNITS/MIN: 20 INJECTION INTRAVENOUS at 06:07

## 2023-07-06 RX ADMIN — POTASSIUM PHOSPHATE, MONOBASIC AND POTASSIUM PHOSPHATE, DIBASIC 30 MMOL: 224; 236 INJECTION, SOLUTION, CONCENTRATE INTRAVENOUS at 07:07

## 2023-07-06 RX ADMIN — CHLORHEXIDINE GLUCONATE 0.12% ORAL RINSE 15 ML: 1.2 LIQUID ORAL at 08:07

## 2023-07-06 RX ADMIN — CEFTRIAXONE 1 G: 1 INJECTION, POWDER, FOR SOLUTION INTRAMUSCULAR; INTRAVENOUS at 08:07

## 2023-07-06 RX ADMIN — INSULIN ASPART 2 UNITS: 100 INJECTION, SOLUTION INTRAVENOUS; SUBCUTANEOUS at 12:07

## 2023-07-06 RX ADMIN — VASOPRESSIN 0.04 UNITS/MIN: 20 INJECTION INTRAVENOUS at 03:07

## 2023-07-06 RX ADMIN — PROPOFOL 30 MCG/KG/MIN: 10 INJECTION, EMULSION INTRAVENOUS at 05:07

## 2023-07-06 RX ADMIN — INSULIN ASPART 2 UNITS: 100 INJECTION, SOLUTION INTRAVENOUS; SUBCUTANEOUS at 11:07

## 2023-07-06 RX ADMIN — PROPOFOL 30 MCG/KG/MIN: 10 INJECTION, EMULSION INTRAVENOUS at 04:07

## 2023-07-06 NOTE — ASSESSMENT & PLAN NOTE
--Will proceed with EGD today   --NPO  --Trend CBC every 12 hours, Transfused for Hgb <7.0  --Please maintain 2 large bore IV's  --PPI IV BID  --Continue Octreotide drip

## 2023-07-06 NOTE — TRANSFER OF CARE
"Anesthesia Transfer of Care Note    Patient: Marely Hamilton    Procedure(s) Performed: Procedure(s) (LRB):  EGD (ESOPHAGOGASTRODUODENOSCOPY) (N/A)    Patient location: ICU    Anesthesia Type: general    Post pain: adequate analgesia    Post assessment: no apparent anesthetic complications    Post vital signs: stable    Level of consciousness: sedated    Nausea/Vomiting: no nausea/vomiting    Complications: none    Transfer of care protocol was followedComments: Pt remain in ICU intubated on previous drips , monitors, report to RN      Last vitals:   Visit Vitals  BP (!) 102/55   Pulse 65   Temp 36.6 °C (97.9 °F) (Core Esophageal)   Resp (!) 22   Ht 5' 2" (1.575 m)   Wt 56 kg (123 lb 7.3 oz)   SpO2 99%   Breastfeeding No   BMI 22.58 kg/m²     "

## 2023-07-06 NOTE — PHARMACY MED REC
"Admission Medication History     The home medication history was taken by Lucia Anguiano.    You may go to "Admission" then "Reconcile Home Medications" tabs to review and/or act upon these items.     The home medication list has been updated by the Pharmacy department.   Please read ALL comments highlighted in yellow.   Please address this information as you see fit.    Feel free to contact us if you have any questions or require assistance.      The medications listed below were removed from the home medication list. Please reorder if appropriate:  Patient reports no longer taking the following medication(s):  BACITRACIN ZINC 500 UNIT OINT PACK  HYDROXYZINE 50 MG TABLET  THIAMINE 100 MG TABLET      Medications listed below were obtained from: [employer]- [email or web address] and SNF/Rehab/LT[facility]   PTA Medications   Medication Sig    ARIPiprazole (ABILIFY) 20 MG Tab   Take 5 mg by mouth once daily.    folic acid (FOLVITE) 1 MG tablet   Take 1 mg by mouth every evening.    furosemide (LASIX) 20 MG tablet   Take 20 mg by mouth once daily.    lactulose (CHRONULAC) 10 gram/15 mL solution   Take 10 g by mouth 3 (three) times daily.    lithium carbonate 150 MG capsule   Take 150 mg by mouth 2 (two) times a day.    magnesium oxide (MAG-OX) 400 mg (241.3 mg magnesium) tablet   Take by mouth once daily.    pantoprazole (PROTONIX) 40 MG tablet   Take 40 mg by mouth once daily.    propranoloL (INDERAL) 40 MG tablet   Take 40 mg by mouth once daily.    QUEtiapine (SEROQUEL) 300 MG Tab   Take 100 mg by mouth every evening.    sodium bicarbonate 650 MG tablet   Take 650 mg by mouth Daily.    spironolactone (ALDACTONE) 50 MG tablet   Take 1 tablet (50 mg total) by mouth once daily.    zonisamide (ZONEGRAN) 100 MG Cap   Take 100 mg by mouth once daily.    levETIRAcetam (KEPPRA) 1000 MG tablet   Take 1,000 mg by mouth 2 (two) times daily. Patient not taking: Reported on 7/6/2023)    OLANZapine (ZYPREXA) 10 MG tablet   Take 1 tablet (10 mg " total) by mouth every evening. (Patient not taking: Reported on 7/6/2023)    rifAXIMin (XIFAXAN) 550 mg Tab   Take 1 tablet (550 mg total) by mouth 2 (two) times daily. (Patient not taking: Reported on 7/6/2023)       Lucia Anguiano  EXT [id number]                  .

## 2023-07-06 NOTE — NURSING
SICU PLAN OF CARE NOTE    Dx: GIB    Shift Events: 1 RBC 1 FFP 1 Vry 1 TXA 1 Plt. OGT placed per order    Goals of Care: MAP > 65; Possible GI intervention today    Neuro: Arouses to Voice, Follows Commands, and Moves All Extremities    Vital Signs: BP (!) 105/58 (BP Location: Left arm, Patient Position: Lying)   Pulse 65   Temp 97.5 °F (36.4 °C)   Resp (!) 22   Wt 56 kg (123 lb 6.4 oz)   SpO2 95%   Breastfeeding No   BMI 22.57 kg/m²     Respiratory: Ventilator 40% 5 PEEP    Diet: NPO    Gtts: Propfol, Norepinephrine, Vasopressin, and Octreotide    Urine Output: Urinary Catheter 40-60 cc/hour    Labs/Accuchecks: Accu q6H CBC q4H Coags and Mag q8H.    Skin: No new skin breakdown noted. Pt repositioned q2H and PRN. Protective foams and creams utilized. Incontinence care and catheter care completed.

## 2023-07-06 NOTE — PLAN OF CARE
[facility]  Initial Discharge Assessment       Primary Care Provider: Viktor Ross MD    Admission Diagnosis: Hyperkalemia [E87.5]  GI bleeding [K92.2]  Weakness [R53.1]  Intubation of airway performed without difficulty [Z78.9]    Admission Date: 7/5/2023  Expected Discharge Date: 7/10/2023    Transition of Care Barriers: None    Payor: [employer] MANAGED MEDICARE / Plan: Open-Plug MEDICARE HMO / Product Type: Capitation /     Extended Emergency Contact Information  Primary Emergency Contact: Zaria Hamilton   [facility]  Home Phone: [phone]  Relation: Sister    Discharge Plan A: Skilled Nursing Facility  Discharge Plan B: Home with family, Home Health      [facility] - [facility] - [address]  [address]  [place] [id number]  Phone: [phone] Fax: [phone]      Initial Assessment (most recent)       Adult Discharge Assessment - 07/06/23 1319          Discharge Assessment    Assessment Type Discharge Planning Assessment     Confirmed/corrected address, phone number and insurance Yes     Confirmed Demographics Correct on Facesheet     Source of Information family     If unable to respond/provide information was family/caregiver contacted? Yes     Contact Name/Number sister Luis, [phone]     Communicated BRAYAN with patient/caregiver Date not available/Unable to determine     People in Home alone     Facility Arrived From: [facility]     Do you expect to return to your current living situation? Yes     Do you have help at home or someone to help you manage your care at home? --   Patient's sister plans to stay with patient once she dc's from [facility]    Current cognitive status: Unable to Assess     Equipment Currently Used at Home none     Readmission within 30 days? Yes     Patient currently being followed by outpatient case management? No     Do you currently have service(s) that help you manage your care at home? No      Who is going to help you get home at discharge? Sister     How do you get to doctors appointments? family or friend will provide     Are you on dialysis? No     Do you take coumadin? No     Discharge Plan A Skilled Nursing Facility     Discharge Plan B Home with family;Home Health     DME Needed Upon Discharge  --   TBD    Discharge Plan discussed with: Sibling     Transition of Care Barriers None                    07/06/23 1329   Post-Acute Status   Post-Acute Authorization Placement   Post-Acute Placement Status Referrals Sent     Spoke with patient's sister, Zaria, to discuss dc planning. Patient discharged from [facility] to [facility] on 6/30/23, readmitted to [facility] 7/5/23. Zaria lives in [place] and plans to travel to [place] once patient dc's from [facility]. Zaria reports she will eventually bring patient to live with her in [place] [facility]. aZria states she has used [employer] sit[facility] services in the past and will hire them again if necessary. Zaria prefers for patient to return to [facility] at [place]. Referral sent in [facility].    Mirela Cintron LCSW  [facility]  Ext. [phone]

## 2023-07-06 NOTE — PHARMACY MED REC
"Admission Medication History     The home medication history was taken by Lucia Anguiano.    You may go to "Admission" then "Reconcile Home Medications" tabs to review and/or act upon these items.     The home medication list has been updated by the Pharmacy department.   Please read ALL comments highlighted in yellow.   Please address this information as you see fit.    Feel free to contact us if you have any questions or require assistance.      UNABLE TO ASSESS PATIENT AT THIS TIME.    Medications listed below were obtained from: [employer]- [employer] Medications   Medication Sig    bacitracin zinc 500 unit/gram OiPk   Apply topically 3 (three) times daily. for 14 days    folic acid (FOLVITE) 1 MG tablet   Take 1 tablet (1 mg total) by mouth once daily.    furosemide (LASIX) 40 MG tablet   Take 1 tablet (40 mg total) by mouth 2 (two) times daily.    hydrOXYzine (ATARAX) 50 MG tablet   Take 1 tablet (50 mg total) by mouth nightly as needed for Itching or Anxiety (or insomnia).    lactulose (CHRONULAC) 20 gram/30 mL Soln     30 mLs (20 g total) by Per NG tube route 3 (three) times daily. Always give first dose in AM. Hold PM dose(s) if has had 3 BMs by them    levETIRAcetam (KEPPRA) 1000 MG tablet   Take 1,000 mg by mouth 2 (two) times daily.    lithium carbonate 150 MG capsule   Take 1 capsule (150 mg total) by mouth every evening.    OLANZapine (ZYPREXA) 10 MG tablet   Take 1 tablet (10 mg total) by mouth every evening.    propranoloL (INDERAL) 20 MG tablet   Take 20 mg by mouth once daily.    QUEtiapine (SEROQUEL) 100 MG Tab   Take 100 mg by mouth.    rifAXIMin (XIFAXAN) 550 mg Tab   Take 1 tablet (550 mg total) by mouth 2 (two) times daily.    spironolactone (ALDACTONE) 50 MG tablet   Take 1 tablet (50 mg total) by mouth once daily.    thiamine 100 MG tablet   Take 100 mg by mouth once daily.       Potential issues to be addressed PRIOR TO DISCHARGE  Please discuss with the patient barriers to " adherence with medication treatment plans  Patient requires education regarding drug therapies     Lucia Anguiano  EXT [id number]                  .

## 2023-07-06 NOTE — SUBJECTIVE & OBJECTIVE
Subjective:     Interval History: Followed up with patient for consideration of EGD. Patient admitted and was hemodynamically unstable. Intubated and transfused. Now adequately resuscitated. On Low dose Levophed and Vasopressin. No transfusion required since yesterday. Counts marginal. No melena. OG output bloody.     Review of Systems   Unable to perform ROS: Intubated   Objective:     Vital Signs (Most Recent):  Temp: 97 °F (36.1 °C) (07/06/23 0900)  Pulse: (!) 58 (07/06/23 0900)  Resp: (!) 24 (07/06/23 0900)  BP: (!) 118/57 (07/06/23 0703)  SpO2: 100 % (07/06/23 0900) Vital Signs (24h Range):  Temp:  [91.4 °F (33 °C)-98.1 °F (36.7 °C)] 97 °F (36.1 °C)  Pulse:  [] 58  Resp:  [0-32] 24  SpO2:  [68 %-100 %] 100 %  BP: ()/(27-90) 118/57  Arterial Line BP: ()/(41-69) 114/47     Weight: 56 kg (123 lb 6.4 oz) (07/06/23 0400)  Body mass index is 22.57 kg/m².      Intake/Output Summary (Last 24 hours) at 7/6/2023 0911  Last data filed at 7/6/2023 0800  Gross per 24 hour   Intake 6853.15 ml   Output 895 ml   Net 5958.15 ml       Lines/Drains/Airways       Central Venous Catheter Line  Duration             Percutaneous Central Line Insertion/Assessment - Triple Lumen  07/05/23 1122 Internal Jugular Left <1 day              Drain  Duration                  NG/OG Tube 07/06/23 0323 Center mouth <1 day         Urethral Catheter 07/05/23 1332 Double-lumen <1 day              Airway  Duration                  Airway - Non-Surgical 07/05/23 1220 Endotracheal Tube <1 day              Arterial Line  Duration             Arterial Line 07/05/23 1603 Right Radial <1 day              Peripheral Intravenous Line  Duration                  Peripheral IV - Single Lumen 07/05/23 1600 20 G Anterior;Proximal;Right Forearm <1 day         Peripheral IV - Single Lumen 07/06/23 0546 18 G Anterior;Right Upper Arm <1 day                     Physical Exam  Vitals and nursing note reviewed.   Constitutional:       Appearance: She  is not ill-appearing.   HENT:      Mouth/Throat:      Mouth: Mucous membranes are dry.      Comments: OG in place with bloody contents  Eyes:      General: Scleral icterus present.   Abdominal:      General: Abdomen is flat. Bowel sounds are normal. There is no distension.      Palpations: Abdomen is soft. There is no mass.      Tenderness: There is no abdominal tenderness.      Hernia: No hernia is present.   Musculoskeletal:         General: Swelling present.   Skin:     General: Skin is warm.      Coloration: Skin is jaundiced.      Findings: Bruising present. No erythema.   Neurological:      Comments: Intubated, sedated        Significant Labs:  CBC:   Recent Labs   Lab 07/06/23  0021 07/06/23  0024 07/06/23  0314 07/06/23  0758   WBC 5.56  --  4.18 4.67   HGB 8.6*  --  7.3* 7.9*   HCT 25.1* 21* 21.9* 23.1*   PLT 42*  --  59* 65*     CMP:   Recent Labs   Lab 07/06/23 0314   *   CALCIUM 7.6*   ALBUMIN 2.1*   PROT 3.8*      K 3.8   CO2 20*   *   BUN 34*   CREATININE 0.6   ALKPHOS 45*   ALT 15   AST 34   BILITOT 4.6*     Coagulation:   Recent Labs   Lab 07/06/23 0758   INR 1.3*   APTT 28.7         Significant Imaging:  Imaging results within the past 24 hours have been reviewed.

## 2023-07-06 NOTE — PROGRESS NOTES
[facility]  Gastroenterology  Progress Note    Patient Name: Marely Hamilton  MRN: [id number]  Admission Date: 7/5/2023  Hospital Length of Stay: 1 days  Code Status: DNR   Attending Provider: Bubba David MD  Consulting Provider: Robina Damian NP  Primary Care Physician: Viktor Ross MD  Principal Problem: <principal problem not specified>    Subjective:     Interval History: Followed up with patient for consideration of EGD. Patient admitted and was hemodynamically unstable. Intubated and transfused. Now adequately resuscitated. On Low dose Levophed and Vasopressin. No transfusion required since yesterday. Counts marginal. No melena. OG output bloody.     Review of Systems   Unable to perform ROS: Intubated   Objective:     Vital Signs (Most Recent):  Temp: 97 °F (36.1 °C) (07/06/23 0900)  Pulse: (!) 58 (07/06/23 0900)  Resp: (!) 24 (07/06/23 0900)  BP: (!) 118/57 (07/06/23 0703)  SpO2: 100 % (07/06/23 0900) Vital Signs (24h Range):  Temp:  [91.4 °F (33 °C)-98.1 °F (36.7 °C)] 97 °F (36.1 °C)  Pulse:  [] 58  Resp:  [0-32] 24  SpO2:  [68 %-100 %] 100 %  BP: ()/(27-90) 118/57  Arterial Line BP: ()/(41-69) 114/47     Weight: 56 kg (123 lb 6.4 oz) (07/06/23 0400)  Body mass index is 22.57 kg/m².      Intake/Output Summary (Last 24 hours) at 7/6/2023 0911  Last data filed at 7/6/2023 0800  Gross per 24 hour   Intake 6853.15 ml   Output 895 ml   Net 5958.15 ml       Lines/Drains/Airways       Central Venous Catheter Line  Duration             Percutaneous Central Line Insertion/Assessment - Triple Lumen  07/05/23 1122 Internal Jugular Left <1 day              Drain  Duration                  NG/OG Tube 07/06/23 0323 Center mouth <1 day         Urethral Catheter 07/05/23 1332 Double-lumen <1 day              Airway  Duration                  Airway - Non-Surgical 07/05/23 1220 Endotracheal Tube <1 day              Arterial Line  Duration             Arterial Line 07/05/23 1601  Right Radial <1 day              Peripheral Intravenous Line  Duration                  Peripheral IV - Single Lumen 07/05/23 1600 20 G Anterior;Proximal;Right Forearm <1 day         Peripheral IV - Single Lumen 07/06/23 0546 18 G Anterior;Right Upper Arm <1 day                     Physical Exam  Vitals and nursing note reviewed.   Constitutional:       Appearance: She is not ill-appearing.   HENT:      Mouth/Throat:      Mouth: Mucous membranes are dry.      Comments: OG in place with bloody contents  Eyes:      General: Scleral icterus present.   Abdominal:      General: Abdomen is flat. Bowel sounds are normal. There is no distension.      Palpations: Abdomen is soft. There is no mass.      Tenderness: There is no abdominal tenderness.      Hernia: No hernia is present.   Musculoskeletal:         General: Swelling present.   Skin:     General: Skin is warm.      Coloration: Skin is jaundiced.      Findings: Bruising present. No erythema.   Neurological:      Comments: Intubated, sedated        Significant Labs:  CBC:   Recent Labs   Lab 07/06/23  0021 07/06/23  0024 07/06/23  0314 07/06/23  0758   WBC 5.56  --  4.18 4.67   HGB 8.6*  --  7.3* 7.9*   HCT 25.1* 21* 21.9* 23.1*   PLT 42*  --  59* 65*     CMP:   Recent Labs   Lab 07/06/23 0314   *   CALCIUM 7.6*   ALBUMIN 2.1*   PROT 3.8*      K 3.8   CO2 20*   *   BUN 34*   CREATININE 0.6   ALKPHOS 45*   ALT 15   AST 34   BILITOT 4.6*     Coagulation:   Recent Labs   Lab 07/06/23  0758   INR 1.3*   APTT 28.7         Significant Imaging:  Imaging results within the past 24 hours have been reviewed.    Assessment/Plan:     Oncology  Acute blood loss anemia  --Will proceed with EGD today   --NPO  --Trend CBC every 12 hours, Transfused for Hgb <7.0  --Please maintain 2 large bore IV's  --PPI IV BID  --Continue Octreotide drip         Thank you for your consult. I will follow-up with patient. Please contact us if you have any additional  questions.    Robina Damian NP  Gastroenterology  [facility]

## 2023-07-06 NOTE — CARE UPDATE
Dr. Leone and Dr. Ibarra at the bedside. Aware of VS, blood transfusion status, lab results, and gtts. Aware of assessment findings, including copious amounts of fresh bloody stool.

## 2023-07-06 NOTE — PROGRESS NOTES
Propofol gtt off during assessment.  Pt opens eyes to gentle touch, moves all extremities equally.  See flow sheet for complete assessment.

## 2023-07-06 NOTE — PLAN OF CARE
Recommendations     1. When medically able, initiate TF regimen of Impact Peptide 1.5 @ goal rate of 45 mL/hr- provides 1620 kcals, 101 g pro, and 832 mL fluid.   2. If extubated and able to tolerate PO intake before next RD f/u, ADAT to high kcal/protein- texture per SLP.   3. RD following.     Goals: Will meet % EEN/EPN by next RD f/u.  Nutrition Goal Status: new  Communication of RD Recs:  (POC)    Nuzhat Ayala RDN,LDN

## 2023-07-06 NOTE — CONSULTS
[facility]  Adult Nutrition  Consult Note    SUMMARY     Recommendations    1. When medically able, initiate TF regimen of Impact Peptide 1.5 @ goal rate of 45 mL/hr- provides 1620 kcals, 101 g pro, and 832 mL fluid.   2. If extubated and able to tolerate PO intake before next RD f/u, ADAT to high kcal/protein- texture per SLP.   3. RD following.    Goals: Will meet % EEN/EPN by next RD f/u.  Nutrition Goal Status: new  Communication of RD Recs:  (POC)    Assessment and Plan    Severe protein-calorie malnutrition  Malnutrition Type:  Context: social/environmental circumstances  Level: severe    Related to (etiology):   Decline in ADL's, physiological cause    Signs and Symptoms (as evidenced by):   Findings of NFPE and poor PO intake    Malnutrition Characteristic Summary:  Energy Intake (Malnutrition): less than or equal to 75% for greater than or equal to 1 month  Subcutaneous Fat (Malnutrition): severe depletion  Muscle Mass (Malnutrition): severe depletion    Interventions/Recommendations (treatment strategy):  1. When medically able, initiate TF regimen of Impact Peptide 1.5 @ goal rate of 45 mL/hr- provides 1620 kcals, 101 g pro, and 832 mL fluid.   2. If extubated and able to tolerate PO intake before next RD f/u, ADAT to high kcal/protein- texture per SLP.   3. RD following.    Collaboration of nutrition care with other providers  EN    Nutrition Diagnosis Status:   Continues    Malnutrition Assessment  Malnutrition Context: social/environmental circumstances  Malnutrition Level: severe  Skin (Micronutrient): bruised, wounds unhealed  Nails (Micronutrient): clubbed  Hair/Scalp (Micronutrient): dry  Eyes (Micronutrient): conjunctiva dull  Teeth (Micronutrient): broken dentition  Tongue (Micronutrient): red  Neck/Chest (Micronutrient): bony prominence, subcutaneous fat loss, muscle wasting  Musculoskeletal/Lower Extremities: muscle wasting, subcutaneous fat loss   Micronutrient  Evaluation Summary: suspected deficiency  Micronutrient Evaluation Comments: protein, iron, B vitamins   Energy Intake (Malnutrition): less than or equal to 75% for greater than or equal to 1 month  Subcutaneous Fat (Malnutrition): severe depletion  Muscle Mass (Malnutrition): severe depletion   Orbital Region (Subcutaneous Fat Loss): severe depletion  Upper Arm Region (Subcutaneous Fat Loss): severe depletion  Thoracic and Lumbar Region: severe depletion   Zoroastrian Region (Muscle Loss): severe depletion  Clavicle Bone Region (Muscle Loss): severe depletion  Clavicle and Acromion Bone Region (Muscle Loss): severe depletion  Scapular Bone Region (Muscle Loss): severe depletion  Patellar Region (Muscle Loss): severe depletion  Anterior Thigh Region (Muscle Loss): severe depletion  Posterior Calf Region (Muscle Loss): severe depletion     Reason for Assessment    Reason For Assessment: consult  Diagnosis:  (-)  Relevant Medical History: severe protein-kcal malnutrition, ETOH abuse in remission, seizure disorder  Interdisciplinary Rounds: did not attend  General Information Comments: RD consulted for malnutrition. RD familiar with pt from previous admit. Noted pt is intubated. NPO with no TF regimen running at this time. Wt loss continues, wt on 6/29 was 132# and # - indicates 7% wt loss x 1 week. See wt trends below. NFPE completed 6/29 - severe malnutrition dxn continues (see PES statement for details). LBM 7/5.    Wt Readings from Last 10 Encounters:   07/06/23 56 kg (123 lb 7.3 oz)   07/03/23 44.7 kg (98 lb 8.7 oz)   06/29/23 59.9 kg (132 lb 0.9 oz)   05/28/23 59.4 kg (130 lb 15.3 oz)   05/05/23 45.3 kg (99 lb 13.9 oz)   03/10/23 46.8 kg (103 lb 2.8 oz)   02/28/23 47.1 kg (103 lb 13.4 oz)   02/09/23 58.5 kg (129 lb)   01/27/23 58.9 kg (129 lb 13.6 oz)   11/02/21 58.9 kg (129 lb 13.6 oz)       (7/3): Cachetic , more weight loss since last admit here at [facility], PO poor to date  (6/29): Extubated 6/23, diet advanced  "per SLP & TFs discontinued. Disoriented w/ no family at bedside this AM. Per RN documentation, pt tolerating diet w/ 25-50% PO intake. +Weight loss & inadequate energy intake PTA. Severe malnutrition diagnosis continues. Please see PES statement for details. NFPE updated today.    Nutrition Discharge Planning: Pending clinical course    Nutrition Risk Screen    Nutrition Risk Screen: reduced oral intake over the last month    Nutrition/Diet History    Patient Reported Diet/Restrictions/Preferences: general  Spiritual, Cultural Beliefs, Adventism Practices, Values that Affect Care: no  Supplemental Drinks or Food Habits: Ensure Plus  Food Allergies: NKFA  Factors Affecting Nutritional Intake: NPO, on mechanical ventilation, difficulty/impaired swallowing    Anthropometrics    Temp: 96.6 °F (35.9 °C)  Height: 5' 2" (157.5 cm)  Height (inches): 62 in  Weight Method: Bed Scale  Weight: 56 kg (123 lb 7.3 oz)  Weight (lb): 123.46 lb  Ideal Body Weight (IBW), Female: 110 lb  % Ideal Body Weight, Female (lb): 112.24 %  BMI (Calculated): 22.6  BMI Grade: 18.5-24.9 - normal    Lab/Procedures/Meds    Pertinent Labs Reviewed: reviewed  Pertinent Labs Comments: BUN 34, Glucose 182, Calcium 7.6, Phos 2.3, Al Phos 45, Albumin 2.1, T Bili 4.6, A1C <4.0% as of 5/29/23  Pertinent Medications Reviewed: reviewed  Pertinent Medications Comments: pantoprazole, propofol @ 9.4 mL/hr    Estimated/Assessed Needs    Weight Used For Calorie Calculations: 56 kg (123 lb 7.3 oz)  Energy Calorie Requirements (kcal): 5545-3266 kcals  Energy Need Method: Kcal/kg (30-35 kcal/kg)  Protein Requirements:  g (1.5-2.0 g/kg)  Weight Used For Protein Calculations: 56 kg (123 lb 7.3 oz)  Fluid Requirements (mL): 1 mL/kcal or fluid per MD  Estimated Fluid Requirement Method: RDA Method  RDA Method (mL): 1680    Nutrition Prescription Ordered    Current Diet Order: NPO    Evaluation of Received Nutrient/Fluid Intake    I/O: +6.0 L since admit  % Intake " of Estimated Energy Needs: 0%  % Meal Intake: NPO    Nutrition Risk    Level of Risk/Frequency of Follow-up:  (1 time/week)       Monitor and Evaluation    Food and Nutrient Intake: enteral nutrition intake  Food and Nutrient Adminstration: enteral and parenteral nutrition administration  Knowledge/Beliefs/Attitudes: beliefs and attitudes, food and nutrition knowledge/skill  Physical Activity and Function: nutrition-related ADLs and IADLs  Anthropometric Measurements: body mass index, weight change, weight, height/length  Biochemical Data, Medical Tests and Procedures: lipid profile, inflammatory profile, glucose/endocrine profile, gastrointestinal profile, electrolyte and renal panel  Nutrition-Focused Physical Findings: overall appearance     Nutrition Follow-Up    RD Follow-up?: Yes    Nuzhat Colon RDN,LDN

## 2023-07-06 NOTE — PROCEDURES
Marely Hamilton is a 58 y.o. female patient.    Temp: 97.5 °F (36.4 °C) (07/06/23 0500)  Pulse: 65 (07/06/23 0500)  Resp: (!) 22 (07/06/23 0500)  BP: (!) 105/58 (07/06/23 0400)  SpO2: 95 % (07/06/23 0500)  Weight: 56 kg (123 lb 6.4 oz) (07/06/23 0400)       Insert peripheral IV    Date/Time: 7/6/2023 5:43 AM  Performed by: Mallory Trujillo NP  Authorized by: Mallory Trujillo NP   Consent: The procedure was performed in an emergent situation.  Risks and benefits: risks, benefits and alternatives were discussed  Procedure consent: procedure consent matches procedure scheduled  Relevant documents: relevant documents present and verified  Test results: test results available and properly labeled  Site marked: the operative site was marked  Imaging studies: imaging studies available  Required items: required blood products, implants, devices, and special equipment available  Patient identity confirmed: anonymous protocol, patient vented/unresponsive  Local anesthesia used: no    Anesthesia:  Local anesthesia used: no    Sedation:  Patient sedated: yes  Sedatives: propofol  Vitals: Vital signs were monitored during sedation.    Patient tolerance: patient tolerated the procedure well with no immediate complications  Comments: Nursing staff unable to obtain adequate PIV access.   18g PIV placed in right upper arm under ultrasound guidance on initial attempt. + blood return, flushes easily.         Mallory Trujillo NP  Critical Care Medicine    7/6/2023

## 2023-07-06 NOTE — CONSULTS
[facility]  Wound Care    Patient Name:  Marely Hamilton   MRN:  [id number]  Date: 7/6/2023  Diagnosis: <principal problem not specified>    History:     Past Medical History:   Diagnosis Date    Addiction to drug     Alcohol abuse     Alcohol abuse, in remission 6/15/2015    14.5 weeks ago; AA weekly    Anemia     Anxiety 6/15/2015    Behavioral problem     Bipolar disorder     Bipolar disorder in remission 6/15/2015    Cirrhosis, Laennec's 6/15/2015    Depression     Encounter for blood transfusion     Epistaxis 6/15/2015    Fatigue     Glaucoma     Hematuria     Hepatic encephalopathy 6/15/2015    Hepatic enlargement     History of psychiatric care     History of psychiatric hospitalization     History of seizure 6/15/2015    1    hx of intentional Tylenol overdose 6/15/2015    2005- situational and hx of bipolar    Hx of psychiatric care     Macrocytic anemia 9/18/2015    6 units PRBC since June 2015    Jeana     Osteoarthritis     Other ascites 6/15/2015    Psychiatric exam requested by authority     Psychiatric problem     Psychosis 9/26/2019    Renal disorder     Seizures     Self-harming behavior     Suicide attempt     Therapy     Thrombocytopenia 6/15/2015       Social History     Socioeconomic History    Marital status: Single   Occupational History    Occupation: Disabled     Employer: DISABLED   Tobacco Use    Smoking status: Never    Smokeless tobacco: Never   Substance and Sexual Activity    Alcohol use: Not Currently     Comment: hx of ETOH abuse with cirrhosis of liver    Drug use: No    Sexual activity: Not Currently   Other Topics Concern    Patient feels they ought to cut down on drinking/drug use No    Patient annoyed by others criticizing their drinking/drug use No    Patient has felt bad or guilty about drinking/drug use No    Patient has had a drink/used drugs as an eye opener in the AM No   Social History Narrative    Pt has 1 older and 1 younger sister.  Her father  committed suicide when she was 18.  She has a [employer] degree and worked as an , but she has been disabled since age 40.  She never  and has no children.  She is not dating and claims no current friends.  She currently lives with her mother along with her pet dog.  She denies any hobbies and says she is not Adventist.     Social Determinants of Health     Financial Resource Strain: Unknown    Difficulty of Paying Living Expenses: Patient refused   Food Insecurity: Unknown    Worried About Running Out of Food in the Last Year: Patient refused    Ran Out of Food in the Last Year: Patient refused   Transportation Needs: Unknown    Lack of Transportation (Medical): Patient refused    Lack of Transportation (Non-Medical): Patient refused   Physical Activity: Unknown    Days of Exercise per Week: Patient refused    Minutes of Exercise per Session: Patient refused   Stress: Unknown    Feeling of Stress : Patient refused   Social Connections: Unknown    Frequency of Communication with Friends and Family: Patient refused    Frequency of Social Gatherings with Friends and Family: Patient refused    Attends Druze Services: Patient refused    Active Member of Clubs or Organizations: Patient refused    Attends Club or Organization Meetings: Patient refused    Marital Status: Patient refused   Housing Stability: Unknown    Unable to Pay for Housing in the Last Year: Patient refused    Number of Places Lived in the Last Year: 1    Unstable Housing in the Last Year: Patient refused       Precautions:     Allergies as of 07/05/2023 - Reviewed 07/05/2023   Allergen Reaction Noted    Sulfa (sulfonamide antibiotics) Rash 11/26/2014    Codeine Nausea And Vomiting 04/26/2018       [facility] Assessment Details/Treatment   Patient seen for wound care consultation.   Reviewed chart for this encounter.   See Flow Sheet for findings.    Patient came in with a partial thickness skin loss on the sacrum, likely due to moisture and  friction. Triad applied. The bridge of the nose is intact with dried exudate. The chest is intact, pink and the periwound is ecchymotic. Patient is intubated, incontinent of stool and has a baugh catheter.     RECOMMENDATIONS:  - Sacrum and buttocks: Bedside nursing to cleanse with soap and water, pat dry and apply triad BID and PRN; no diapers nor dressings   - Nose and chest: leave open to air  - Turning every 2 hours  - Heel protecting boots  - Nursing to maintain pressure injury prevention interventions     07/06/23 1356        Altered Skin Integrity 06/29/23 1523 Sacral spine Moisture associated dermatitis   Date First Assessed/Time First Assessed: 06/29/23 1523   Location: Sacral spine  Primary Wound Type: Moisture associated dermatitis   Wound Image    Dressing Appearance Open to air   Drainage Amount None   Appearance Pink;Moist   Tissue loss description Partial thickness   Periwound Area Intact;Moist   Care Cleansed with:;Soap and water   Dressing Applied;Other (comment)  (triad)        Altered Skin Integrity 06/18/23 2100 medial Nose Blister(s) Intact skin with non-blanchable redness of localized area   Date First Assessed/Time First Assessed: 06/18/23 2100   Altered Skin Integrity Present on Admission - Did Patient arrive to the hospital with altered skin?: suspected hospital acquired  Orientation: medial  Location: Nose  Primary Wound Type: Blister...   Wound Image    Dressing Appearance Open to air   Drainage Amount None   Appearance Intact;Dry   Periwound Area Dry;Intact        Altered Skin Integrity 06/27/23 0506 upper Sternal Skin Tear   Date First Assessed/Time First Assessed: 06/27/23 0506   Altered Skin Integrity Present on Admission - Did Patient arrive to the hospital with altered skin?: suspected hospital acquired  Orientation: upper  Location: Sternal  Primary Wound Type: Skin ...   Wound Image    Dressing Appearance Open to air   Drainage Amount None   Appearance Pink;Intact;Dry   Periwound  Area Intact;Dry;Ecchymotic       Recommendations made to primary team for above plan via secure chat. Orders placed. Wound care will follow-up as needed.     07/06/2023

## 2023-07-06 NOTE — CARE UPDATE
Nurses Note -- 4 Eyes      7/5/2023   1500 PM      Skin assessed during: Admit      [] No Altered Skin Integrity Present    []Prevention Measures Documented      [x] Yes- Altered Skin Integrity Present or Discovered   [x] LDA Added if Not in Epic (Describe Wound)   [] New Altered Skin Integrity was Present on Admit and Documented in LDA   [x] Wound Image Taken    Wound Care Consulted? No    Attending Nurse:  Rena Reynaga RN     Second RN/Staff Member:  LARA Iverson RN

## 2023-07-06 NOTE — CARE UPDATE
Unable to obtain cuff pressures. REGI Arellano MD at bedside to place A-line. Aware of all current assessment findings, labs, gtts, and VS.

## 2023-07-06 NOTE — ANESTHESIA PREPROCEDURE EVALUATION
Pre-operative evaluation for Procedure(s) (LRB):  EGD (ESOPHAGOGASTRODUODENOSCOPY) (N/A)    Marely Hamilton is a 58 y.o. female     Patient Active Problem List   Diagnosis    Cirrhosis, Laennec's    Thrombocytopenia    History of seizure    Glaucoma    hx of intentional Tylenol overdose    Alcohol abuse, in remission    AYESHA (acute kidney injury)    Macrocytic anemia    Chronic liver failure    Severe protein-calorie malnutrition    Hepatic encephalopathy    Bipolar 1 disorder, depressed, severe    Elevated LFTs    Bipolar 1 disorder    Bipolar disorder, manic    SVT (supraventricular tachycardia)    Alcoholic cirrhosis    Metabolic acidosis    Hyperammonemia    Hypercalcemia    Acute metabolic encephalopathy    Low body temperature    Hypophosphatemia    Refeeding syndrome    Non-convulsive status epilepticus    Acute liver failure with hepatic coma    Hypotension due to hypovolemia    Deep vein thrombosis (DVT) of femoral vein of right lower extremity    Acute deep vein thrombosis (DVT) of brachial vein of right upper extremity    Retroperitoneal bleed    Coagulopathy    Atelectasis    Internal jugular (IJ) vein thromboembolism, acute, right    Abrasion of nose    Acute blood loss anemia    Hypotension    Hematochezia       Review of patient's allergies indicates:   Allergen Reactions    Sulfa (sulfonamide antibiotics) Rash    Codeine Nausea And Vomiting       No current facility-administered medications on file prior to encounter.     Current Outpatient Medications on File Prior to Encounter   Medication Sig Dispense Refill    ARIPiprazole (ABILIFY) 20 MG Tab Take 5 mg by mouth once daily.      folic acid (FOLVITE) 1 MG tablet Take 1 tablet (1 mg total) by mouth once daily. (Patient taking differently: Take 1 mg by mouth every evening.) 30 tablet 2    furosemide (LASIX) 20 MG tablet Take 20 mg by mouth once daily.      lactulose (CHRONULAC) 10  gram/15 mL solution Take 10 g by mouth 3 (three) times daily.      lithium carbonate 150 MG capsule Take 1 capsule (150 mg total) by mouth every evening. (Patient taking differently: Take 150 mg by mouth 2 (two) times a day.) 30 capsule 11    magnesium oxide (MAG-OX) 400 mg (241.3 mg magnesium) tablet Take by mouth once daily.      pantoprazole (PROTONIX) 40 MG tablet Take 40 mg by mouth once daily.      propranoloL (INDERAL) 40 MG tablet Take 40 mg by mouth once daily.      QUEtiapine (SEROQUEL) 300 MG Tab Take 100 mg by mouth every evening.      sodium bicarbonate 650 MG tablet Take 650 mg by mouth Daily.      spironolactone (ALDACTONE) 50 MG tablet Take 1 tablet (50 mg total) by mouth once daily. 90 tablet 3    zonisamide (ZONEGRAN) 100 MG Cap Take 100 mg by mouth once daily.      levETIRAcetam (KEPPRA) 1000 MG tablet Take 1,000 mg by mouth 2 (two) times daily.      OLANZapine (ZYPREXA) 10 MG tablet Take 1 tablet (10 mg total) by mouth every evening. (Patient not taking: Reported on 7/6/2023) 30 tablet 11    rifAXIMin (XIFAXAN) 550 mg Tab Take 1 tablet (550 mg total) by mouth 2 (two) times daily. (Patient not taking: Reported on 7/6/2023) 180 tablet 3       Past Surgical History:   Procedure Laterality Date    COSMETIC SURGERY      EMBOLIZATION N/A 6/11/2023    Procedure: EMBOLIZATION, BLOOD VESSEL;  Surgeon: Debbie Surgeon;  Location: [facility];  Service: Anesthesiology;  Laterality: N/A;    ESOPHAGOGASTRODUODENOSCOPY         Social History     Socioeconomic History    Marital status: Single   Occupational History    Occupation: Disabled     Employer: DISABLED   Tobacco Use    Smoking status: Never    Smokeless tobacco: Never   Substance and Sexual Activity    Alcohol use: Not Currently     Comment: hx of ETOH abuse with cirrhosis of liver    Drug use: No    Sexual activity: Not Currently   Other Topics Concern    Patient feels they ought to cut down on drinking/drug use No    Patient  annoyed by others criticizing their drinking/drug use No    Patient has felt bad or guilty about drinking/drug use No    Patient has had a drink/used drugs as an eye opener in the AM No   Social History Narrative    Pt has 1 older and 1 younger sister.  Her father committed suicide when she was 18.  She has a [employer] degree and worked as an , but she has been disabled since age 40.  She never  and has no children.  She is not dating and claims no current friends.  She currently lives with her mother along with her pet dog.  She denies any hobbies and says she is not Mosque.     Social Determinants of Health     Financial Resource Strain: Unknown    Difficulty of Paying Living Expenses: Patient refused   Food Insecurity: Unknown    Worried About Running Out of Food in the Last Year: Patient refused    Ran Out of Food in the Last Year: Patient refused   Transportation Needs: Unknown    Lack of Transportation (Medical): Patient refused    Lack of Transportation (Non-Medical): Patient refused   Physical Activity: Unknown    Days of Exercise per Week: Patient refused    Minutes of Exercise per Session: Patient refused   Stress: Unknown    Feeling of Stress : Patient refused   Social Connections: Unknown    Frequency of Communication with Friends and Family: Patient refused    Frequency of Social Gatherings with Friends and Family: Patient refused    Attends Yarsanism Services: Patient refused    Active Member of Clubs or Organizations: Patient refused    Attends Club or Organization Meetings: Patient refused    Marital Status: Patient refused   Housing Stability: Unknown    Unable to Pay for Housing in the Last Year: Patient refused    Number of Places Lived in the Last Year: 1    Unstable Housing in the Last Year: Patient refused         CBC:   Recent Labs     07/06/23  1222 07/06/23  1625   WBC 4.48 5.28   RBC 2.44* 2.74*   HGB 6.9* 7.9*   HCT 20.8* 23.8*   PLT 78* 69*   MCV 85 87   MCH  28.3 28.8   MCHC 33.2 33.2       CMP:   Recent Labs     23  0314 23  0758 23  1625     --  141   K 3.8  --  3.3*   *  --  111*   CO2 20*  --  22*   BUN 34*  --  28*   CREATININE 0.6  --  0.5   *  --  136*   MG 1.9 1.8 1.9   PHOS 2.3*  --  2.0*   CALCIUM 7.6*  --  7.9*   ALBUMIN 2.1*  --  2.4*   PROT 3.8*  --  4.5*   ALKPHOS 45*  --  52*   ALT 15  --  17   AST 34  --  35   BILITOT 4.6*  --  4.0*       INR  Recent Labs     23  1625   INR 1.4* 1.3* 1.3*   APTT 31.3 28.7 27.8           Diagnostic Studies:      EKD Echo:  No results found for this or any previous visit.    Pre-op Assessment    I have reviewed the Patient Summary Reports.     I have reviewed the Nursing Notes. I have reviewed the NPO Status.   I have reviewed the Medications.     Review of Systems  Hematology/Oncology:         -- Anemia:   Renal/:   Chronic Renal Disease    Hepatic/GI:   Liver Disease,        Physical Exam  General: Unconscious    Airway:  Intubated   Chest/Lungs:  Normal Respiratory Rate    Heart:  Rate: Normal        Anesthesia Plan  Type of Anesthesia, risks & benefits discussed:    Anesthesia Type: Gen ETT  Intra-op Monitoring Plan: Standard ASA Monitors  Induction:  IV  ASA Score: 4 Emergent    Ready For Surgery From Anesthesia Perspective.     .

## 2023-07-07 ENCOUNTER — ANESTHESIA EVENT (OUTPATIENT)
Dept: INTERVENTIONAL RADIOLOGY/VASCULAR | Facility: HOSPITAL | Age: 58
DRG: 356 | End: 2023-07-07
Payer: MEDICARE

## 2023-07-07 PROBLEM — K26.4 GASTROINTESTINAL HEMORRHAGE ASSOCIATED WITH DUODENAL ULCER: Status: ACTIVE | Noted: 2023-07-07

## 2023-07-07 LAB
ALBUMIN SERPL BCP-MCNC: 1.9 G/DL (ref 3.5–5.2)
ALBUMIN SERPL BCP-MCNC: 1.9 G/DL (ref 3.5–5.2)
ALLENS TEST: ABNORMAL
ALLENS TEST: ABNORMAL
ALP SERPL-CCNC: 40 U/L (ref 55–135)
ALP SERPL-CCNC: 46 U/L (ref 55–135)
ALT SERPL W/O P-5'-P-CCNC: 13 U/L (ref 10–44)
ALT SERPL W/O P-5'-P-CCNC: 13 U/L (ref 10–44)
ANION GAP SERPL CALC-SCNC: 4 MMOL/L (ref 8–16)
ANION GAP SERPL CALC-SCNC: 5 MMOL/L (ref 8–16)
APTT PPP: 30.2 SEC (ref 21–32)
APTT PPP: 32.5 SEC (ref 21–32)
APTT PPP: 34.7 SEC (ref 21–32)
APTT PPP: 35.3 SEC (ref 21–32)
AST SERPL-CCNC: 28 U/L (ref 10–40)
AST SERPL-CCNC: 29 U/L (ref 10–40)
B-HCG UR QL: NEGATIVE
BASOPHILS # BLD AUTO: 0.01 K/UL (ref 0–0.2)
BASOPHILS # BLD AUTO: 0.01 K/UL (ref 0–0.2)
BASOPHILS # BLD AUTO: 0.02 K/UL (ref 0–0.2)
BASOPHILS # BLD AUTO: 0.03 K/UL (ref 0–0.2)
BASOPHILS # BLD AUTO: 0.03 K/UL (ref 0–0.2)
BASOPHILS NFR BLD: 0.2 % (ref 0–1.9)
BASOPHILS NFR BLD: 0.2 % (ref 0–1.9)
BASOPHILS NFR BLD: 0.4 % (ref 0–1.9)
BASOPHILS NFR BLD: 0.5 % (ref 0–1.9)
BASOPHILS NFR BLD: 0.5 % (ref 0–1.9)
BASOPHILS NFR BLD: 0.6 % (ref 0–1.9)
BASOPHILS NFR BLD: 0.9 % (ref 0–1.9)
BILIRUB SERPL-MCNC: 3.3 MG/DL (ref 0.1–1)
BILIRUB SERPL-MCNC: 4.1 MG/DL (ref 0.1–1)
BLD PROD TYP BPU: NORMAL
BLOOD UNIT EXPIRATION DATE: NORMAL
BLOOD UNIT TYPE CODE: 5100
BLOOD UNIT TYPE CODE: 6200
BLOOD UNIT TYPE: NORMAL
BUN SERPL-MCNC: 14 MG/DL (ref 6–20)
BUN SERPL-MCNC: 18 MG/DL (ref 6–20)
CA-I BLDV-SCNC: 1.03 MMOL/L (ref 1.06–1.42)
CALCIUM SERPL-MCNC: 7.3 MG/DL (ref 8.7–10.5)
CALCIUM SERPL-MCNC: 7.4 MG/DL (ref 8.7–10.5)
CHLORIDE SERPL-SCNC: 114 MMOL/L (ref 95–110)
CHLORIDE SERPL-SCNC: 119 MMOL/L (ref 95–110)
CO2 SERPL-SCNC: 24 MMOL/L (ref 23–29)
CO2 SERPL-SCNC: 24 MMOL/L (ref 23–29)
CODING SYSTEM: NORMAL
CREAT SERPL-MCNC: 0.4 MG/DL (ref 0.5–1.4)
CREAT SERPL-MCNC: 0.4 MG/DL (ref 0.5–1.4)
CROSSMATCH INTERPRETATION: NORMAL
D DIMER PPP IA.FEU-MCNC: 2.22 MG/L FEU
DIFFERENTIAL METHOD: ABNORMAL
DISPENSE STATUS: NORMAL
EOSINOPHIL # BLD AUTO: 0 K/UL (ref 0–0.5)
EOSINOPHIL # BLD AUTO: 0.1 K/UL (ref 0–0.5)
EOSINOPHIL # BLD AUTO: 0.1 K/UL (ref 0–0.5)
EOSINOPHIL # BLD AUTO: 0.2 K/UL (ref 0–0.5)
EOSINOPHIL # BLD AUTO: 0.2 K/UL (ref 0–0.5)
EOSINOPHIL NFR BLD: 0 % (ref 0–8)
EOSINOPHIL NFR BLD: 0.2 % (ref 0–8)
EOSINOPHIL NFR BLD: 0.7 % (ref 0–8)
EOSINOPHIL NFR BLD: 2.6 % (ref 0–8)
EOSINOPHIL NFR BLD: 3.1 % (ref 0–8)
EOSINOPHIL NFR BLD: 3.5 % (ref 0–8)
EOSINOPHIL NFR BLD: 4.3 % (ref 0–8)
ERYTHROCYTE [DISTWIDTH] IN BLOOD BY AUTOMATED COUNT: 15 % (ref 11.5–14.5)
ERYTHROCYTE [DISTWIDTH] IN BLOOD BY AUTOMATED COUNT: 15.1 % (ref 11.5–14.5)
ERYTHROCYTE [DISTWIDTH] IN BLOOD BY AUTOMATED COUNT: 15.8 % (ref 11.5–14.5)
ERYTHROCYTE [DISTWIDTH] IN BLOOD BY AUTOMATED COUNT: 16 % (ref 11.5–14.5)
ERYTHROCYTE [DISTWIDTH] IN BLOOD BY AUTOMATED COUNT: 16.2 % (ref 11.5–14.5)
ERYTHROCYTE [DISTWIDTH] IN BLOOD BY AUTOMATED COUNT: 16.2 % (ref 11.5–14.5)
ERYTHROCYTE [DISTWIDTH] IN BLOOD BY AUTOMATED COUNT: 16.5 % (ref 11.5–14.5)
EST. GFR  (NO RACE VARIABLE): >60 ML/MIN/1.73 M^2
EST. GFR  (NO RACE VARIABLE): >60 ML/MIN/1.73 M^2
FIBRINOGEN PPP-MCNC: 122 MG/DL (ref 182–400)
FIBRINOGEN PPP-MCNC: 152 MG/DL (ref 182–400)
GLUCOSE SERPL-MCNC: 163 MG/DL (ref 70–110)
GLUCOSE SERPL-MCNC: 166 MG/DL (ref 70–110)
HCO3 UR-SCNC: 22.8 MMOL/L (ref 24–28)
HCO3 UR-SCNC: 24.2 MMOL/L (ref 24–28)
HCT VFR BLD AUTO: 19.7 % (ref 37–48.5)
HCT VFR BLD AUTO: 20.2 % (ref 37–48.5)
HCT VFR BLD AUTO: 21.2 % (ref 37–48.5)
HCT VFR BLD AUTO: 24.4 % (ref 37–48.5)
HCT VFR BLD AUTO: 25.8 % (ref 37–48.5)
HCT VFR BLD AUTO: 28 % (ref 37–48.5)
HCT VFR BLD AUTO: 29.3 % (ref 37–48.5)
HGB BLD-MCNC: 10.1 G/DL (ref 12–16)
HGB BLD-MCNC: 6.6 G/DL (ref 12–16)
HGB BLD-MCNC: 6.7 G/DL (ref 12–16)
HGB BLD-MCNC: 7.3 G/DL (ref 12–16)
HGB BLD-MCNC: 8.3 G/DL (ref 12–16)
HGB BLD-MCNC: 8.8 G/DL (ref 12–16)
HGB BLD-MCNC: 9.6 G/DL (ref 12–16)
IMM GRANULOCYTES # BLD AUTO: 0.02 K/UL (ref 0–0.04)
IMM GRANULOCYTES # BLD AUTO: 0.03 K/UL (ref 0–0.04)
IMM GRANULOCYTES # BLD AUTO: 0.04 K/UL (ref 0–0.04)
IMM GRANULOCYTES # BLD AUTO: 0.04 K/UL (ref 0–0.04)
IMM GRANULOCYTES # BLD AUTO: 0.05 K/UL (ref 0–0.04)
IMM GRANULOCYTES NFR BLD AUTO: 0.4 % (ref 0–0.5)
IMM GRANULOCYTES NFR BLD AUTO: 0.5 % (ref 0–0.5)
IMM GRANULOCYTES NFR BLD AUTO: 0.6 % (ref 0–0.5)
IMM GRANULOCYTES NFR BLD AUTO: 0.7 % (ref 0–0.5)
IMM GRANULOCYTES NFR BLD AUTO: 0.7 % (ref 0–0.5)
IMM GRANULOCYTES NFR BLD AUTO: 0.9 % (ref 0–0.5)
IMM GRANULOCYTES NFR BLD AUTO: 0.9 % (ref 0–0.5)
INR PPP: 1.4 (ref 0.8–1.2)
INR PPP: 1.5 (ref 0.8–1.2)
INR PPP: 1.5 (ref 0.8–1.2)
LYMPHOCYTES # BLD AUTO: 0.4 K/UL (ref 1–4.8)
LYMPHOCYTES # BLD AUTO: 0.4 K/UL (ref 1–4.8)
LYMPHOCYTES # BLD AUTO: 0.5 K/UL (ref 1–4.8)
LYMPHOCYTES # BLD AUTO: 0.6 K/UL (ref 1–4.8)
LYMPHOCYTES # BLD AUTO: 0.8 K/UL (ref 1–4.8)
LYMPHOCYTES # BLD AUTO: 0.8 K/UL (ref 1–4.8)
LYMPHOCYTES # BLD AUTO: 0.9 K/UL (ref 1–4.8)
LYMPHOCYTES NFR BLD: 10.7 % (ref 18–48)
LYMPHOCYTES NFR BLD: 10.8 % (ref 18–48)
LYMPHOCYTES NFR BLD: 11.6 % (ref 18–48)
LYMPHOCYTES NFR BLD: 16.9 % (ref 18–48)
LYMPHOCYTES NFR BLD: 18.6 % (ref 18–48)
LYMPHOCYTES NFR BLD: 20.2 % (ref 18–48)
LYMPHOCYTES NFR BLD: 9.2 % (ref 18–48)
MAGNESIUM SERPL-MCNC: 2.1 MG/DL (ref 1.6–2.6)
MAGNESIUM SERPL-MCNC: 2.2 MG/DL (ref 1.6–2.6)
MAGNESIUM SERPL-MCNC: 2.4 MG/DL (ref 1.6–2.6)
MCH RBC QN AUTO: 29.2 PG (ref 27–31)
MCH RBC QN AUTO: 29.4 PG (ref 27–31)
MCH RBC QN AUTO: 29.5 PG (ref 27–31)
MCH RBC QN AUTO: 29.9 PG (ref 27–31)
MCH RBC QN AUTO: 30 PG (ref 27–31)
MCH RBC QN AUTO: 30 PG (ref 27–31)
MCH RBC QN AUTO: 30.1 PG (ref 27–31)
MCHC RBC AUTO-ENTMCNC: 33.2 G/DL (ref 32–36)
MCHC RBC AUTO-ENTMCNC: 33.5 G/DL (ref 32–36)
MCHC RBC AUTO-ENTMCNC: 34 G/DL (ref 32–36)
MCHC RBC AUTO-ENTMCNC: 34.1 G/DL (ref 32–36)
MCHC RBC AUTO-ENTMCNC: 34.3 G/DL (ref 32–36)
MCHC RBC AUTO-ENTMCNC: 34.4 G/DL (ref 32–36)
MCHC RBC AUTO-ENTMCNC: 34.5 G/DL (ref 32–36)
MCV RBC AUTO: 86 FL (ref 82–98)
MCV RBC AUTO: 86 FL (ref 82–98)
MCV RBC AUTO: 87 FL (ref 82–98)
MCV RBC AUTO: 87 FL (ref 82–98)
MCV RBC AUTO: 88 FL (ref 82–98)
MCV RBC AUTO: 89 FL (ref 82–98)
MCV RBC AUTO: 90 FL (ref 82–98)
MONOCYTES # BLD AUTO: 0.1 K/UL (ref 0.3–1)
MONOCYTES # BLD AUTO: 0.2 K/UL (ref 0.3–1)
MONOCYTES # BLD AUTO: 0.3 K/UL (ref 0.3–1)
MONOCYTES # BLD AUTO: 0.5 K/UL (ref 0.3–1)
MONOCYTES # BLD AUTO: 0.5 K/UL (ref 0.3–1)
MONOCYTES NFR BLD: 11.6 % (ref 4–15)
MONOCYTES NFR BLD: 2.1 % (ref 4–15)
MONOCYTES NFR BLD: 2.2 % (ref 4–15)
MONOCYTES NFR BLD: 3.5 % (ref 4–15)
MONOCYTES NFR BLD: 3.8 % (ref 4–15)
MONOCYTES NFR BLD: 8.2 % (ref 4–15)
MONOCYTES NFR BLD: 9.5 % (ref 4–15)
NEUTROPHILS # BLD AUTO: 2.5 K/UL (ref 1.8–7.7)
NEUTROPHILS # BLD AUTO: 2.8 K/UL (ref 1.8–7.7)
NEUTROPHILS # BLD AUTO: 2.8 K/UL (ref 1.8–7.7)
NEUTROPHILS # BLD AUTO: 3.7 K/UL (ref 1.8–7.7)
NEUTROPHILS # BLD AUTO: 3.9 K/UL (ref 1.8–7.7)
NEUTROPHILS # BLD AUTO: 4 K/UL (ref 1.8–7.7)
NEUTROPHILS # BLD AUTO: 4.9 K/UL (ref 1.8–7.7)
NEUTROPHILS NFR BLD: 62.9 % (ref 38–73)
NEUTROPHILS NFR BLD: 68.6 % (ref 38–73)
NEUTROPHILS NFR BLD: 68.9 % (ref 38–73)
NEUTROPHILS NFR BLD: 81.3 % (ref 38–73)
NEUTROPHILS NFR BLD: 84.3 % (ref 38–73)
NEUTROPHILS NFR BLD: 86.1 % (ref 38–73)
NEUTROPHILS NFR BLD: 86.6 % (ref 38–73)
NRBC BLD-RTO: 0 /100 WBC
NUM UNITS TRANS FFP: NORMAL
NUM UNITS TRANS PACKED RBC: NORMAL
PCO2 BLDA: 30.3 MMHG (ref 35–45)
PCO2 BLDA: 34.5 MMHG (ref 35–45)
PH SMN: 7.45 [PH] (ref 7.35–7.45)
PH SMN: 7.48 [PH] (ref 7.35–7.45)
PHOSPHATE SERPL-MCNC: 2.7 MG/DL (ref 2.7–4.5)
PHOSPHATE SERPL-MCNC: 3.3 MG/DL (ref 2.7–4.5)
PLATELET # BLD AUTO: 43 K/UL (ref 150–450)
PLATELET # BLD AUTO: 49 K/UL (ref 150–450)
PLATELET # BLD AUTO: 57 K/UL (ref 150–450)
PLATELET # BLD AUTO: 60 K/UL (ref 150–450)
PLATELET # BLD AUTO: 62 K/UL (ref 150–450)
PLATELET # BLD AUTO: 76 K/UL (ref 150–450)
PLATELET # BLD AUTO: 96 K/UL (ref 150–450)
PMV BLD AUTO: 10 FL (ref 9.2–12.9)
PMV BLD AUTO: 10 FL (ref 9.2–12.9)
PMV BLD AUTO: 10.2 FL (ref 9.2–12.9)
PMV BLD AUTO: 10.4 FL (ref 9.2–12.9)
PMV BLD AUTO: 10.5 FL (ref 9.2–12.9)
PMV BLD AUTO: 10.7 FL (ref 9.2–12.9)
PMV BLD AUTO: 9.8 FL (ref 9.2–12.9)
PO2 BLDA: 152 MMHG (ref 80–100)
PO2 BLDA: 46 MMHG (ref 40–60)
POC BE: -1 MMOL/L
POC BE: 0 MMOL/L
POC SATURATED O2: 100 % (ref 95–100)
POC SATURATED O2: 84 % (ref 95–100)
POC TCO2: 24 MMOL/L (ref 23–27)
POC TCO2: 25 MMOL/L (ref 24–29)
POCT GLUCOSE: 131 MG/DL (ref 70–110)
POCT GLUCOSE: 160 MG/DL (ref 70–110)
POCT GLUCOSE: 168 MG/DL (ref 70–110)
POCT GLUCOSE: 179 MG/DL (ref 70–110)
POTASSIUM SERPL-SCNC: 4.3 MMOL/L (ref 3.5–5.1)
POTASSIUM SERPL-SCNC: 4.4 MMOL/L (ref 3.5–5.1)
PROT SERPL-MCNC: 3.4 G/DL (ref 6–8.4)
PROT SERPL-MCNC: 3.5 G/DL (ref 6–8.4)
PROTHROMBIN TIME: 14.6 SEC (ref 9–12.5)
PROTHROMBIN TIME: 14.8 SEC (ref 9–12.5)
PROTHROMBIN TIME: 15.2 SEC (ref 9–12.5)
PROTHROMBIN TIME: 15.4 SEC (ref 9–12.5)
PROTHROMBIN TIME: 15.6 SEC (ref 9–12.5)
RBC # BLD AUTO: 2.19 M/UL (ref 4–5.4)
RBC # BLD AUTO: 2.28 M/UL (ref 4–5.4)
RBC # BLD AUTO: 2.44 M/UL (ref 4–5.4)
RBC # BLD AUTO: 2.77 M/UL (ref 4–5.4)
RBC # BLD AUTO: 3.01 M/UL (ref 4–5.4)
RBC # BLD AUTO: 3.25 M/UL (ref 4–5.4)
RBC # BLD AUTO: 3.37 M/UL (ref 4–5.4)
SAMPLE: ABNORMAL
SAMPLE: ABNORMAL
SITE: ABNORMAL
SITE: ABNORMAL
SODIUM SERPL-SCNC: 143 MMOL/L (ref 136–145)
SODIUM SERPL-SCNC: 147 MMOL/L (ref 136–145)
TRANS ERYTHROCYTES VOL PATIENT: NORMAL ML
UNIT NUMBER: NORMAL
UNIT NUMBER: NORMAL
WBC # BLD AUTO: 3.42 K/UL (ref 3.9–12.7)
WBC # BLD AUTO: 3.96 K/UL (ref 3.9–12.7)
WBC # BLD AUTO: 4.13 K/UL (ref 3.9–12.7)
WBC # BLD AUTO: 4.57 K/UL (ref 3.9–12.7)
WBC # BLD AUTO: 4.57 K/UL (ref 3.9–12.7)
WBC # BLD AUTO: 5.37 K/UL (ref 3.9–12.7)
WBC # BLD AUTO: 5.69 K/UL (ref 3.9–12.7)

## 2023-07-07 PROCEDURE — D9220A PRA ANESTHESIA: Mod: ANES,,, | Performed by: ANESTHESIOLOGY

## 2023-07-07 PROCEDURE — P9021 RED BLOOD CELLS UNIT: HCPCS | Performed by: STUDENT IN AN ORGANIZED HEALTH CARE EDUCATION/TRAINING PROGRAM

## 2023-07-07 PROCEDURE — 99900035 HC TECH TIME PER 15 MIN (STAT)

## 2023-07-07 PROCEDURE — 83735 ASSAY OF MAGNESIUM: CPT | Mod: 91

## 2023-07-07 PROCEDURE — 25000003 PHARM REV CODE 250: Performed by: REGISTERED NURSE

## 2023-07-07 PROCEDURE — 84100 ASSAY OF PHOSPHORUS: CPT

## 2023-07-07 PROCEDURE — 85025 COMPLETE CBC W/AUTO DIFF WBC: CPT | Mod: 91 | Performed by: INTERNAL MEDICINE

## 2023-07-07 PROCEDURE — 99900026 HC AIRWAY MAINTENANCE (STAT)

## 2023-07-07 PROCEDURE — 85610 PROTHROMBIN TIME: CPT | Mod: 91 | Performed by: STUDENT IN AN ORGANIZED HEALTH CARE EDUCATION/TRAINING PROGRAM

## 2023-07-07 PROCEDURE — D9220A PRA ANESTHESIA: ICD-10-PCS | Mod: ANES,,, | Performed by: ANESTHESIOLOGY

## 2023-07-07 PROCEDURE — 63600175 PHARM REV CODE 636 W HCPCS: Performed by: INTERNAL MEDICINE

## 2023-07-07 PROCEDURE — 83735 ASSAY OF MAGNESIUM: CPT

## 2023-07-07 PROCEDURE — 63600175 PHARM REV CODE 636 W HCPCS: Performed by: REGISTERED NURSE

## 2023-07-07 PROCEDURE — 25500020 PHARM REV CODE 255: Performed by: INTERNAL MEDICINE

## 2023-07-07 PROCEDURE — 36625 INSERTION CATHETER ARTERY: CPT | Mod: ,,, | Performed by: NURSE PRACTITIONER

## 2023-07-07 PROCEDURE — D9220A PRA ANESTHESIA: ICD-10-PCS | Mod: CRNA,,, | Performed by: REGISTERED NURSE

## 2023-07-07 PROCEDURE — 99232 SBSQ HOSP IP/OBS MODERATE 35: CPT | Mod: ,,,

## 2023-07-07 PROCEDURE — 25000003 PHARM REV CODE 250: Performed by: INTERNAL MEDICINE

## 2023-07-07 PROCEDURE — 63600175 PHARM REV CODE 636 W HCPCS: Performed by: STUDENT IN AN ORGANIZED HEALTH CARE EDUCATION/TRAINING PROGRAM

## 2023-07-07 PROCEDURE — P9016 RBC LEUKOCYTES REDUCED: HCPCS

## 2023-07-07 PROCEDURE — P9012 CRYOPRECIPITATE EACH UNIT: HCPCS

## 2023-07-07 PROCEDURE — 27000221 HC OXYGEN, UP TO 24 HOURS

## 2023-07-07 PROCEDURE — 80053 COMPREHEN METABOLIC PANEL: CPT | Mod: 91

## 2023-07-07 PROCEDURE — 37799 UNLISTED PX VASCULAR SURGERY: CPT

## 2023-07-07 PROCEDURE — 86965 POOLING BLOOD PLATELETS: CPT

## 2023-07-07 PROCEDURE — P9017 PLASMA 1 DONOR FRZ W/IN 8 HR: HCPCS | Performed by: STUDENT IN AN ORGANIZED HEALTH CARE EDUCATION/TRAINING PROGRAM

## 2023-07-07 PROCEDURE — 85384 FIBRINOGEN ACTIVITY: CPT | Performed by: STUDENT IN AN ORGANIZED HEALTH CARE EDUCATION/TRAINING PROGRAM

## 2023-07-07 PROCEDURE — 94761 N-INVAS EAR/PLS OXIMETRY MLT: CPT

## 2023-07-07 PROCEDURE — 85025 COMPLETE CBC W/AUTO DIFF WBC: CPT

## 2023-07-07 PROCEDURE — 36625 ARTERIAL LINE: ICD-10-PCS | Mod: ,,, | Performed by: NURSE PRACTITIONER

## 2023-07-07 PROCEDURE — 94003 VENT MGMT INPAT SUBQ DAY: CPT

## 2023-07-07 PROCEDURE — 36430 TRANSFUSION BLD/BLD COMPNT: CPT

## 2023-07-07 PROCEDURE — 82803 BLOOD GASES ANY COMBINATION: CPT

## 2023-07-07 PROCEDURE — 85379 FIBRIN DEGRADATION QUANT: CPT | Performed by: STUDENT IN AN ORGANIZED HEALTH CARE EDUCATION/TRAINING PROGRAM

## 2023-07-07 PROCEDURE — 99232 PR SUBSEQUENT HOSPITAL CARE,LEVL II: ICD-10-PCS | Mod: ,,,

## 2023-07-07 PROCEDURE — 82330 ASSAY OF CALCIUM: CPT

## 2023-07-07 PROCEDURE — 99223 1ST HOSP IP/OBS HIGH 75: CPT | Mod: ,,, | Performed by: PHYSICIAN ASSISTANT

## 2023-07-07 PROCEDURE — 81025 URINE PREGNANCY TEST: CPT | Performed by: STUDENT IN AN ORGANIZED HEALTH CARE EDUCATION/TRAINING PROGRAM

## 2023-07-07 PROCEDURE — C9113 INJ PANTOPRAZOLE SODIUM, VIA: HCPCS | Performed by: STUDENT IN AN ORGANIZED HEALTH CARE EDUCATION/TRAINING PROGRAM

## 2023-07-07 PROCEDURE — 85730 THROMBOPLASTIN TIME PARTIAL: CPT | Mod: 91 | Performed by: NURSE PRACTITIONER

## 2023-07-07 PROCEDURE — 85730 THROMBOPLASTIN TIME PARTIAL: CPT

## 2023-07-07 PROCEDURE — 84100 ASSAY OF PHOSPHORUS: CPT | Mod: 91

## 2023-07-07 PROCEDURE — 25000003 PHARM REV CODE 250

## 2023-07-07 PROCEDURE — P9035 PLATELET PHERES LEUKOREDUCED: HCPCS | Performed by: NURSE PRACTITIONER

## 2023-07-07 PROCEDURE — 85610 PROTHROMBIN TIME: CPT

## 2023-07-07 PROCEDURE — 99223 PR INITIAL HOSPITAL CARE,LEVL III: ICD-10-PCS | Mod: ,,, | Performed by: PHYSICIAN ASSISTANT

## 2023-07-07 PROCEDURE — 25000003 PHARM REV CODE 250: Performed by: STUDENT IN AN ORGANIZED HEALTH CARE EDUCATION/TRAINING PROGRAM

## 2023-07-07 PROCEDURE — D9220A PRA ANESTHESIA: Mod: CRNA,,, | Performed by: REGISTERED NURSE

## 2023-07-07 PROCEDURE — 80053 COMPREHEN METABOLIC PANEL: CPT

## 2023-07-07 PROCEDURE — 20000000 HC ICU ROOM

## 2023-07-07 RX ORDER — LEVETIRACETAM 500 MG/5ML
1000 INJECTION, SOLUTION, CONCENTRATE INTRAVENOUS EVERY 12 HOURS
Status: DISCONTINUED | OUTPATIENT
Start: 2023-07-07 | End: 2023-07-16

## 2023-07-07 RX ORDER — HYDROCODONE BITARTRATE AND ACETAMINOPHEN 500; 5 MG/1; MG/1
TABLET ORAL
Status: DISCONTINUED | OUTPATIENT
Start: 2023-07-07 | End: 2023-07-10

## 2023-07-07 RX ORDER — ONDANSETRON 2 MG/ML
INJECTION INTRAMUSCULAR; INTRAVENOUS
Status: DISCONTINUED | OUTPATIENT
Start: 2023-07-07 | End: 2023-07-07

## 2023-07-07 RX ORDER — CALCIUM GLUCONATE 20 MG/ML
1 INJECTION, SOLUTION INTRAVENOUS
Status: COMPLETED | OUTPATIENT
Start: 2023-07-07 | End: 2023-07-07

## 2023-07-07 RX ORDER — ROCURONIUM BROMIDE 10 MG/ML
INJECTION, SOLUTION INTRAVENOUS
Status: DISCONTINUED | OUTPATIENT
Start: 2023-07-07 | End: 2023-07-07

## 2023-07-07 RX ORDER — FENTANYL CITRATE 50 UG/ML
INJECTION, SOLUTION INTRAMUSCULAR; INTRAVENOUS
Status: DISCONTINUED | OUTPATIENT
Start: 2023-07-07 | End: 2023-07-07

## 2023-07-07 RX ORDER — DEXAMETHASONE SODIUM PHOSPHATE 4 MG/ML
INJECTION, SOLUTION INTRA-ARTICULAR; INTRALESIONAL; INTRAMUSCULAR; INTRAVENOUS; SOFT TISSUE
Status: DISCONTINUED | OUTPATIENT
Start: 2023-07-07 | End: 2023-07-07

## 2023-07-07 RX ADMIN — INSULIN ASPART 1 UNITS: 100 INJECTION, SOLUTION INTRAVENOUS; SUBCUTANEOUS at 11:07

## 2023-07-07 RX ADMIN — PANTOPRAZOLE SODIUM 40 MG: 40 INJECTION, POWDER, FOR SOLUTION INTRAVENOUS at 08:07

## 2023-07-07 RX ADMIN — ONDANSETRON 4 MG: 2 INJECTION INTRAMUSCULAR; INTRAVENOUS at 11:07

## 2023-07-07 RX ADMIN — IOHEXOL 70 ML: 350 INJECTION, SOLUTION INTRAVENOUS at 05:07

## 2023-07-07 RX ADMIN — IOHEXOL 80 ML: 300 INJECTION, SOLUTION INTRAVENOUS at 11:07

## 2023-07-07 RX ADMIN — LEVETIRACETAM 1000 MG: 100 INJECTION, SOLUTION INTRAVENOUS at 08:07

## 2023-07-07 RX ADMIN — LEVETIRACETAM 1000 MG: 100 INJECTION, SOLUTION INTRAVENOUS at 06:07

## 2023-07-07 RX ADMIN — PROPOFOL 25 MCG/KG/MIN: 10 INJECTION, EMULSION INTRAVENOUS at 09:07

## 2023-07-07 RX ADMIN — PROPOFOL 10 MCG/KG/MIN: 10 INJECTION, EMULSION INTRAVENOUS at 01:07

## 2023-07-07 RX ADMIN — IOHEXOL 30 ML: 350 INJECTION, SOLUTION INTRAVENOUS at 05:07

## 2023-07-07 RX ADMIN — MUPIROCIN: 20 OINTMENT TOPICAL at 09:07

## 2023-07-07 RX ADMIN — FENTANYL CITRATE 50 MCG: 50 INJECTION, SOLUTION INTRAMUSCULAR; INTRAVENOUS at 10:07

## 2023-07-07 RX ADMIN — PANTOPRAZOLE SODIUM 40 MG: 40 INJECTION, POWDER, FOR SOLUTION INTRAVENOUS at 09:07

## 2023-07-07 RX ADMIN — FENTANYL CITRATE 50 MCG: 50 INJECTION, SOLUTION INTRAMUSCULAR; INTRAVENOUS at 11:07

## 2023-07-07 RX ADMIN — CALCIUM GLUCONATE 1 G: 20 INJECTION, SOLUTION INTRAVENOUS at 04:07

## 2023-07-07 RX ADMIN — DEXAMETHASONE SODIUM PHOSPHATE 4 MG: 4 INJECTION, SOLUTION INTRAMUSCULAR; INTRAVENOUS at 10:07

## 2023-07-07 RX ADMIN — CEFTRIAXONE 1 G: 1 INJECTION, POWDER, FOR SOLUTION INTRAMUSCULAR; INTRAVENOUS at 09:07

## 2023-07-07 RX ADMIN — ROCURONIUM BROMIDE 20 MG: 10 INJECTION, SOLUTION INTRAVENOUS at 10:07

## 2023-07-07 RX ADMIN — CALCIUM GLUCONATE 1 G: 20 INJECTION, SOLUTION INTRAVENOUS at 05:07

## 2023-07-07 RX ADMIN — PROPOFOL 50 MCG/KG/MIN: 10 INJECTION, EMULSION INTRAVENOUS at 09:07

## 2023-07-07 RX ADMIN — MUPIROCIN: 20 OINTMENT TOPICAL at 08:07

## 2023-07-07 RX ADMIN — INSULIN ASPART 2 UNITS: 100 INJECTION, SOLUTION INTRAVENOUS; SUBCUTANEOUS at 06:07

## 2023-07-07 NOTE — TRANSFER OF CARE
"Anesthesia Transfer of Care Note    Patient: Marely Hamilton    Procedure(s) Performed: * No procedures listed *    Patient location: ICU    Anesthesia Type: general    Transport from OR: Transported from OR intubated on 100% O2  with adequate ventilation controlled by transport ventilator. Continuous ECG monitoring in transport. Continuous SpO2 monitoring in transport. Continuos invasive BP monitoring in transport    Post pain: adequate analgesia    Post assessment: no apparent anesthetic complications and tolerated procedure well    Post vital signs: stable    Level of consciousness: sedated    Nausea/Vomiting: no nausea/vomiting    Complications: none    Transfer of care protocol was followedComments: Nurse at bedside, VSS, spont reg resp noted      Last vitals:   Visit Vitals  /67 (BP Location: Left arm, Patient Position: Lying)   Pulse (!) 53   Temp (!) 34.6 °C (94.3 °F) (Axillary)   Resp 18   Ht 5' 2" (1.575 m)   Wt 54 kg (119 lb)   SpO2 97%   Breastfeeding No   BMI 21.77 kg/m²     "

## 2023-07-07 NOTE — PROGRESS NOTES
Nurses Note -- 4 Eyes      7/7/2023   12:02 PM      Skin assessed during: Admit      [] No Altered Skin Integrity Present    []Prevention Measures Documented      [x] Yes- Altered Skin Integrity Present or Discovered   [x] LDA Added if Not in Epic (Describe Wound)   [] New Altered Skin Integrity was Present on Admit and Documented in LDA   [x] Wound Image Taken    Wound Care Consulted? Yes    Attending Nurse:  Henry Singh RN     Second RN/Staff Member:  Ayala Ordoñez RN

## 2023-07-07 NOTE — NURSING
1000:  MD Baxter notified of Arterial line waveform dampened following RN redressing and repositioning. OK to DC A line. MD notified of increasing bright red drainage from OGT.    0100: NP Holdridge at bedside to replace arterial line. Phone consent verified by RN x2.    0230: MD Mcdermott at bedside to replace central line.    0245: Pt w/ large dark red bowel movement; MD Baxter and Sharron notified. Orders received for blood products. CTA ordered and completed without complications . Labs with MD Mcdermott.

## 2023-07-07 NOTE — PLAN OF CARE
Angiogram with embolization  completed. Hemostasis achieved to right groin with use of Angio-seal closure device deployed at 1115.  Pt to remain flat for 2 hours, may sit up at 1315. Pt will be transferred to critical care bed with anesthesia team.

## 2023-07-07 NOTE — SUBJECTIVE & OBJECTIVE
Subjective:     Interval History: Followed up with patient s/p EGD with continued bloody OG output. Duodenal ulcer noted with small ooze. Clip placed. Per nursing, no hematochezia noted, however, significant blood product resuscitation required overnight and this AM. CTA GI Bleed performed this AM. Patient with marginal blood pressures and hypothermic. Was weaned off of pressors while being transfused; now with transient hypotension.     Review of Systems   Unable to perform ROS: Intubated   Objective:     Vital Signs (Most Recent):  Temp: 97.6 °F (36.4 °C) (07/07/23 0900)  Pulse: (!) 59 (07/07/23 0930)  Resp: 18 (07/07/23 0930)  BP: 120/66 (07/07/23 0900)  SpO2: 98 % (07/07/23 0930) Vital Signs (24h Range):  Temp:  [94.1 °F (34.5 °C)-98.6 °F (37 °C)] 97.6 °F (36.4 °C)  Pulse:  [52-72] 59  Resp:  [16-38] 18  SpO2:  [96 %-100 %] 98 %  BP: ()/(45-66) 120/66  Arterial Line BP: ()/() 114/52     Weight: 54 kg (119 lb) (07/07/23 0400)  Body mass index is 21.77 kg/m².      Intake/Output Summary (Last 24 hours) at 7/7/2023 0946  Last data filed at 7/7/2023 0900  Gross per 24 hour   Intake 3252.02 ml   Output 2490 ml   Net 762.02 ml         Lines/Drains/Airways       Central Venous Catheter Line  Duration             Percutaneous Central Line Insertion/Assessment - Triple Lumen  07/07/23 0232 Femoral Vein Right;Femoral Right <1 day              Drain  Duration                  NG/OG Tube 07/06/23 0323 Center mouth 1 day         Urethral Catheter 07/05/23 1332 Double-lumen 1 day              Airway  Duration                  Airway - Non-Surgical 07/05/23 1220 Endotracheal Tube 1 day              Arterial Line  Duration             Arterial Line 07/07/23 0232 Left Radial <1 day              Peripheral Intravenous Line  Duration                  Peripheral IV - Single Lumen 07/05/23 1600 20 G Anterior;Proximal;Right Forearm 1 day         Peripheral IV - Single Lumen 07/06/23 0546 18 G Anterior;Right Upper Arm  1 day         Peripheral IV - Single Lumen 07/06/23 2202 18 G Anterior;Left Forearm <1 day                     Physical Exam  Vitals and nursing note reviewed.   Constitutional:       Appearance: She is not ill-appearing.   HENT:      Mouth/Throat:      Mouth: Mucous membranes are dry.      Comments: OG in place with bloody contents  Eyes:      General: Scleral icterus present.   Abdominal:      General: Abdomen is flat. Bowel sounds are normal. There is no distension.      Palpations: Abdomen is soft. There is no mass.      Tenderness: There is no abdominal tenderness.      Hernia: No hernia is present.   Musculoskeletal:         General: Swelling present.   Skin:     General: Skin is warm.      Coloration: Skin is jaundiced.      Findings: Bruising present. No erythema.   Neurological:      Comments: Intubated, sedated        Significant Labs:  CBC:   Recent Labs   Lab 07/06/23  2346 07/07/23  0245 07/07/23  0805   WBC 4.13 5.37 3.96   HGB 6.7* 6.6* 10.1*   HCT 20.2* 19.7* 29.3*   PLT 57* 60* 43*       CMP:   Recent Labs   Lab 07/07/23  0245   *   CALCIUM 7.3*   ALBUMIN 1.9*   PROT 3.4*      K 4.3   CO2 24   *   BUN 18   CREATININE 0.4*   ALKPHOS 40*   ALT 13   AST 29   BILITOT 3.3*       Coagulation:   Recent Labs   Lab 07/07/23  0805   INR 1.5*   APTT 34.7*           Significant Imaging:  Imaging results within the past 24 hours have been reviewed.    EGD 7/6/2023  Impression:            - Esophageal ulcer with no bleeding and no                          stigmata of recent bleeding.                          - No esophageal varices.                          - Non bleeding gastric ulcer likely due to OG tube                          trauma.                          - Non-bleeding duodenal ulcers with no stigmata of                          bleeding. Small amount of oozing noted. Clip was                          placed for marking.                          - No specimens collected.    Recommendation:        - Return patient to ICU for ongoing care.                          - NPO.                          -Continue high dose IV PPI.                          - If significant overt GI bleeding occurs then                          consider STAT CTA abdomen and pelvis with bleeding                          protocol and IR consult for possible embolization                          vs empiric embolization by IR targeting the clip                          that was placed adjacent to the duodenal ulcers.   Attending Participation:        I personally performed the entire procedure.   Bernice Guillen MD   7/6/2023 7:52:51 PM

## 2023-07-07 NOTE — PROCEDURES
"Marely Hamilton is a 58 y.o. female patient.    Temp: 98.1 °F (36.7 °C) (07/06/23 2315)  Pulse: 61 (07/07/23 0232)  Resp: 18 (07/07/23 0232)  BP: (!) 90/50 (07/07/23 0200)  SpO2: 99 % (07/07/23 0232)  Weight: 56 kg (123 lb 7.3 oz) (07/06/23 1324)  Height: 5' 2" (157.5 cm) (07/06/23 1324)       Central Line    Date/Time: 7/7/2023 2:38 AM  Performed by: Mell Mcdermott MD  Authorized by: Mell cMdermott MD     Location procedure was performed:  [facility] CR[facility]  Pre-operative diagnosis:  Hypotension; GI bleed  Post-operative diagnosis:  Hypotension  Consent Done ?:  Yes  Time out complete?: Verified correct patient, procedure, equipment, staff, and site/side    Indications:  Vascular access and med administration  Anesthesia:  Local infiltration  Local anesthetic:  Lidocaine 1% without epinephrine  Anesthetic total (ml):  5  Preparation:  Skin prepped with ChloraPrep  Skin prep agent dried: Skin prep agent completely dried prior to procedure    Sterile barriers: All five maximal sterile barriers used - gloves, gown, cap, mask and large sterile sheet    Hand hygiene: Hand hygiene performed immediately prior to central venous catheter insertion    Location:  Right femoral  Site selection rationale:  Pt had old central line in the [facility]. RIJ wtih substantial clot burden  Catheter type:  Triple lumen  Catheter size:  7 Fr  Inserted Catheter Length (cm):  20  Ultrasound guidance: Yes    Vessel Caliber:  Small  Comprressibility:  Normal  Needle advanced into vessel with real time ultrasound guidance.    Guidewire confirmed in vessel.    Steril sheath on probe.    Sterile gel used.  Manometry: Yes    Number of attempts:  1  Securement:  Line sutured, chlorhexidine patch, sterile dressing applied and blood return through all ports  Complications: No    Adverse Events:  None    7/7/2023    "

## 2023-07-07 NOTE — NURSING
"      SICU PLAN OF CARE NOTE    Dx: GIB    Shift Events: 2 RBC 1 FFP. A line replaced. [facility] central line removed per MD order, replaced w/ R Fem TLC. CTA abdomen pelvis and neck soft tissue completed    Goals of Care: MAP > 65; Lateral transfer to MICU    Neuro: Arouses to Voice and Follows Commands    Vital Signs: /64   Pulse 60   Temp (!) 94.1 °F (34.5 °C) (Oral)   Resp (!) 35   Ht 5' 2" (1.575 m)   Wt 54 kg (119 lb)   SpO2 97%   Breastfeeding No   BMI 21.77 kg/m²     Respiratory: Ventilator    Diet: NPO    Gtts: Propfol and Norepinephrine    Urine Output: Urinary Catheter  cc/hour    Drains: NG/OG Tube 300 cc /  shift Bright red drainage       Labs/Accuchecks: Accu q6h CBC q4H, PT INR Mag q8H.    Skin: No new skin breakdown noted. Pt repositioned q2H and PRN. Protective foams and creams utilized. Incontinence care and catheter care completed.       "

## 2023-07-07 NOTE — PLAN OF CARE
Pt arrived to IR room 188 for embo, no acute distress noted. Orders, consents and labs reviewed on chart. Anesthesia at bedside.

## 2023-07-07 NOTE — PLAN OF CARE
CMICU DAILY GOALS       A: Awake    RASS: Goal - RASS Goal: -2-->light sedation  Actual - RASS (Pepper Agitation-Sedation Scale): moderate sedation   Restraint necessity: Clinical Justification: Removing medical devices  B: Breathe   SBT: Not attempted   C: Coordinate A & B, analgesics/sedatives   Pain: managed    SAT: Not attempted  D: Delirium   CAM-ICU: Overall CAM-ICU: Positive  E: Early(intubated/ Progressive (non-intubated) Mobility   MOVE Screen: Fail   Activity: Activity Management: Rolling - L1  FAS: Feeding/Nutrition   Diet order: Diet/Nutrition Received: NPO,    T: Thrombus   DVT prophylaxis: VTE Required Core Measure: (SCDs) Sequential compression device initiated/maintained  H: HOB Elevation   Head of Bed (HOB) Positioning: HOB at 15 degrees  U: Ulcer Prophylaxis   GI: yes  G: Glucose control   managed    S: Skin   Bathing/Skin Care: bath, complete, electrode patches/site rotation, dressed/undressed, linen changed, incontinence care  Device Skin Pressure Protection: absorbent pad utilized/changed, adhesive use limited, mittens applied to hands, positioning supports utilized, pressure points protected, skin-to-device areas padded  Pressure Reduction Devices: specialty bed utilized, positioning supports utilized, foam padding utilized  Pressure Reduction Techniques: weight shift assistance provided, pressure points protected  Skin Protection: adhesive use limited, incontinence pads utilized, tubing/devices free from skin contact, transparent dressing maintained, skin-to-device areas padded  B: Bowel Function   no issues   I: Indwelling Catheters   Saldaña necessity:      Urethral Catheter 07/05/23 1332 Double-lumen-Reason for Continuing Urinary Catheterization: Critically ill in ICU and requiring hourly monitoring of intake/output   CVC necessity: Yes  D: De-escalation Antibiotics   No    Family/Goals of care/Code Status   Code Status: DNR    24H Vital Sign Range  Temp:  [94.1 °F (34.5 °C)-98.6 °F (37  °C)]   Pulse:  [51-72]   Resp:  [15-38]   BP: ()/(50-75)   SpO2:  [96 %-100 %]   Arterial Line BP: ()/()      Shift Events   Patient was received from IR at 11 am on ventilator with levophed going at 0.06, pt had two large bloody bowel movements, critical care team 1 made aware, consulting IR again, trending hemoglobin and hematocrit, pt received one unit of platelets for platelets of 49, pt's hemoglobin and hematocrit trending down from 10.1 at 0843 to 7.3 at 1642, critical care team 1 made aware, pt receiving one unit of PRBC, Current GTTS: levophed infusing at 0.04 and propofol infusing at 40, pt's sister Zaria was updated regarding the pt's situation and answered all of her concerns over the phone, vital signs stable throughout the shift.      VS and assessment per flow sheet, patient progressing towards goals as tolerated, plan of care reviewed with family, all concerns addressed, will continue to monitor.    Carlos Santiago

## 2023-07-07 NOTE — ANESTHESIA POSTPROCEDURE EVALUATION
Anesthesia Post Evaluation    Patient: Marely Hamilton    Procedure(s) Performed: Procedure(s) (LRB):  EGD (ESOPHAGOGASTRODUODENOSCOPY) (N/A)    Final Anesthesia Type: general      Patient location during evaluation: ICU  Patient participation: No - Unable to Participate, Intubation  Level of consciousness: sedated  Post-procedure vital signs: reviewed and stable  Pain management: adequate    PONV status at discharge: No PONV  Anesthetic complications: no      Cardiovascular status: blood pressure returned to baseline  Respiratory status: intubated  Hydration status: euvolemic  Follow-up not needed.          Vitals Value Taken Time   BP 89/52 07/07/23 0807   Temp 34.5 °C (94.1 °F) 07/07/23 0800   Pulse 56 07/07/23 0845   Resp 34 07/07/23 0845   SpO2 98 % 07/07/23 0845   Vitals shown include unvalidated device data.      No case tracking events are documented in the log.      Pain/Jaison Score: No data recorded

## 2023-07-07 NOTE — ANESTHESIA PREPROCEDURE EVALUATION
[facility]  Anesthesia Pre-Operative Evaluation   07/07/2023        Marely Hamilton, 1965  [id number]  IR ANGIOGRAM VISCERAL    Subjective    Marely Hamilton is a 58 y.o. female w/ a significant PMHx of alcoholic cirrhosis, seizure disorder, DVT and IJ thrombus with recent admission for large retroperitoneal hemorrhage requiring IR embolization and IVC filter placement who presents for hematochezia. Complicated by shock requiring norepinephrine, volume resuscitation and blood products.    Patient now presents for above procedure(s).     Anesthesia E-consent completed with sister Zaria Hamilton at [phone].     Discussed with sister, Zaria. DNR suspended during procedure.       Prev Airway:   Intubated    LDA:   Percutaneous Central Line Insertion/Assessment - Triple Lumen  07/07/23 0232 Femoral Vein Right;Femoral Right (Active)   Verification by X-ray Yes 07/07/23 0701   Site Assessment No redness;No swelling;No warmth 07/07/23 0701   Line Securement Device Secured with sutures 07/07/23 0701   Dressing Type CHG impregnated dressing/sponge;Central line dressing 07/07/23 0701   Dressing Status Intact;New drainage 07/07/23 0701   Dressing Intervention Integrity maintained 07/07/23 0701   Date on Dressing 07/07/23 07/07/23 0701   Dressing Due to be Changed 07/14/23 07/07/23 0701   Distal Patency/Care infusing 07/07/23 0701   Medial 1 Patency/Care infusing 07/07/23 0701   Proximal 1 Patency/Care infusing 07/07/23 0701   Waveform Not being transduced 07/07/23 0701   Number of days: 0            Peripheral IV - Single Lumen 07/05/23 1600 20 G Anterior;Proximal;Right Forearm (Active)   Site Assessment Clean;Dry;Intact;No redness;No swelling 07/07/23 0701   Extremity Assessment Distal to IV No abnormal discoloration;No redness;No swelling;No warmth 07/07/23 0701   Line Status Infusing 07/07/23 0701   Dressing Status Clean;Dry;Intact 07/07/23 0701   Dressing Intervention Integrity maintained  07/07/23 0701   Dressing Change Due 07/09/23 07/07/23 0701   Site Change Due 07/09/23 07/07/23 0701   Reason Not Rotated Not due 07/07/23 0701   Number of days: 1            Peripheral IV - Single Lumen 07/06/23 0546 18 G Anterior;Right Upper Arm (Active)   Site Assessment Clean;Dry;Intact;No redness;No swelling 07/07/23 0701   Extremity Assessment Distal to IV No abnormal discoloration;No redness;No swelling;No warmth 07/07/23 0701   Line Status Flushed;Saline locked 07/07/23 0701   Dressing Status Clean;Dry;Intact 07/07/23 0701   Dressing Intervention Integrity maintained 07/07/23 0701   Dressing Change Due 07/10/23 07/07/23 0701   Site Change Due 07/10/23 07/07/23 0701   Reason Not Rotated Not due 07/07/23 0701   Number of days: 1            Peripheral IV - Single Lumen 07/06/23 2202 18 G Anterior;Left Forearm (Active)   Site Assessment Clean;Dry;Intact;No redness;No swelling 07/07/23 0701   Extremity Assessment Distal to IV No abnormal discoloration;No redness;No swelling;No warmth 07/07/23 0701   Line Status Flushed;Saline locked 07/07/23 0701   Dressing Status Clean;Dry;Intact 07/07/23 0701   Dressing Intervention Integrity maintained 07/07/23 0701   Dressing Change Due 07/10/23 07/07/23 0701   Site Change Due 07/10/23 07/07/23 0701   Reason Not Rotated Not due 07/07/23 0701   Number of days: 0       Arterial Line 07/07/23 0232 Left Radial (Active)   Site Assessment Clean;Intact;No redness 07/07/23 0701   Line Status Pulsatile blood flow 07/07/23 0701   Art Line Waveform Appropriate 07/07/23 0708   Arterial Line Interventions Zeroed and calibrated;Leveled;Connections checked and tightened;Flushed per protocol;Line pulled back 07/07/23 0701   Color/Movement/Sensation Capillary refill less than 3 sec 07/07/23 0701   Dressing Type CHG impregnated dressing/sponge;Central line dressing 07/07/23 0701   Dressing Status Clean;Intact 07/07/23 0701   Dressing Intervention Integrity maintained 07/07/23 0701   Dressing  "Change Due 07/11/23 07/07/23 0701   Tubing Change Due 07/11/23 07/07/23 0701   Number of days: 0            NG/OG Tube 07/06/23 0323 Center mouth (Active)   Placement Check placement verified by x-ray 07/07/23 0701   Tolerance no signs/symptoms of discomfort 07/07/23 0701   Securement secured to commercial device 07/07/23 0701   Clamp Status/Tolerance unclamped 07/07/23 0701   Suction Setting/Drainage Method suction at;low;intermittent setting 07/07/23 0701   Insertion Site Appearance no redness, warmth, tenderness, skin breakdown, drainage 07/07/23 0701   Drainage Bloody 07/07/23 0701   Flush/Irrigation flushed w/;water;no resistance met 07/07/23 0701   Intake (mL) 50 mL 07/07/23 0800   Tube Output(mL)(Include Discarded Residual) 150 mL 07/07/23 0800   Number of days: 1            Urethral Catheter 07/05/23 1332 Double-lumen (Active)   Site Assessment Clean;Intact 07/07/23 0701   Collection Container Urimeter 07/07/23 0701   Securement Method secured to top of thigh w/ adhesive device 07/07/23 0701   Catheter Care Performed yes 07/07/23 0701   Reason for Continuing Urinary Catheterization Critically ill in ICU and requiring hourly monitoring of intake/output 07/07/23 0701   CAUTI Prevention Bundle Securement Device in place with 1" slack;Intact seal between catheter & drainage tubing;Drainage bag/urimeter off the floor;Sheeting clip in use;No dependent loops or kinks;Drainage bag/urimeter not overfilled (<2/3 full);Drainage bag/urimeter below bladder 07/07/23 0701   Output (mL) 40 mL 07/07/23 0800   Number of days: 1       Drips:    dextrose 5 % and 0.45 % NaCl      NORepinephrine bitartrate-D5W Stopped (07/07/23 0626)    propofoL 25 mcg/kg/min (07/07/23 0800)       Patient Active Problem List   Diagnosis    Cirrhosis, Laennec's    Thrombocytopenia    History of seizure    Glaucoma    hx of intentional Tylenol overdose    Alcohol abuse, in remission    AYESHA (acute kidney injury)    Macrocytic anemia    " Chronic liver failure    Severe protein-calorie malnutrition    Hepatic encephalopathy    Bipolar 1 disorder, depressed, severe    Elevated LFTs    Bipolar 1 disorder    Bipolar disorder, manic    SVT (supraventricular tachycardia)    Alcoholic cirrhosis    Metabolic acidosis    Hyperammonemia    Hypercalcemia    Acute metabolic encephalopathy    Low body temperature    Hypophosphatemia    Refeeding syndrome    Non-convulsive status epilepticus    Acute liver failure with hepatic coma    Hypotension due to hypovolemia    Deep vein thrombosis (DVT) of femoral vein of right lower extremity    Acute deep vein thrombosis (DVT) of brachial vein of right upper extremity    Retroperitoneal bleed    Coagulopathy    Atelectasis    Internal jugular (IJ) vein thromboembolism, acute, right    Abrasion of nose    Acute blood loss anemia    Hypotension    Hematochezia       Review of patient's allergies indicates:   Allergen Reactions    Sulfa (sulfonamide antibiotics) Rash    Codeine Nausea And Vomiting       Current Inpatient Medications:    cefTRIAXone (ROCEPHIN) IVPB  1 g Intravenous Q24H    levETIRAcetam (Keppra) IV (PEDS and ADULTS)  1,000 mg Intravenous Q12H    mupirocin   Nasal BID    pantoprazole  40 mg Intravenous BID       No current facility-administered medications on file prior to encounter.     Current Outpatient Medications on File Prior to Encounter   Medication Sig Dispense Refill    ARIPiprazole (ABILIFY) 20 MG Tab Take 5 mg by mouth once daily.      folic acid (FOLVITE) 1 MG tablet Take 1 tablet (1 mg total) by mouth once daily. (Patient taking differently: Take 1 mg by mouth every evening.) 30 tablet 2    furosemide (LASIX) 20 MG tablet Take 20 mg by mouth once daily.      lactulose (CHRONULAC) 10 gram/15 mL solution Take 10 g by mouth 3 (three) times daily.      lithium carbonate 150 MG capsule Take 1 capsule (150 mg total) by mouth every evening. (Patient taking  differently: Take 150 mg by mouth 2 (two) times a day.) 30 capsule 11    magnesium oxide (MAG-OX) 400 mg (241.3 mg magnesium) tablet Take by mouth once daily.      pantoprazole (PROTONIX) 40 MG tablet Take 40 mg by mouth once daily.      propranoloL (INDERAL) 40 MG tablet Take 40 mg by mouth once daily.      QUEtiapine (SEROQUEL) 300 MG Tab Take 100 mg by mouth every evening.      sodium bicarbonate 650 MG tablet Take 650 mg by mouth Daily.      spironolactone (ALDACTONE) 50 MG tablet Take 1 tablet (50 mg total) by mouth once daily. 90 tablet 3    zonisamide (ZONEGRAN) 100 MG Cap Take 100 mg by mouth once daily.      levETIRAcetam (KEPPRA) 1000 MG tablet Take 1,000 mg by mouth 2 (two) times daily.      OLANZapine (ZYPREXA) 10 MG tablet Take 1 tablet (10 mg total) by mouth every evening. (Patient not taking: Reported on 7/6/2023) 30 tablet 11    rifAXIMin (XIFAXAN) 550 mg Tab Take 1 tablet (550 mg total) by mouth 2 (two) times daily. (Patient not taking: Reported on 7/6/2023) 180 tablet 3       Past Surgical History:   Procedure Laterality Date    COSMETIC SURGERY      EMBOLIZATION N/A 6/11/2023    Procedure: EMBOLIZATION, BLOOD VESSEL;  Surgeon: Debbie Surgeon;  Location: [facility];  Service: Anesthesiology;  Laterality: N/A;    ESOPHAGOGASTRODUODENOSCOPY      ESOPHAGOGASTRODUODENOSCOPY N/A 7/6/2023    Procedure: EGD (ESOPHAGOGASTRODUODENOSCOPY);  Surgeon: Bernice Guillen MD;  Location: [address]);  Service: Endoscopy;  Laterality: N/A;       Social History:  Tobacco Use: Low Risk     Smoking Tobacco Use: Never    Smokeless Tobacco Use: Never    Passive Exposure: Not on file       Alcohol Use: Unknown    Frequency of Alcohol Consumption: Patient refused    Average Number of Drinks: Patient refused    Frequency of Binge Drinking: Patient refused       Objective    Vital Signs Range:  BMI Readings from Last 1 Encounters:   07/07/23 21.77 kg/m²       Temp:  [34.5 °C (94.1 °F)-36.7 °C (98.1  °F)]   Pulse:  [52-68]   Resp:  [16-38]   BP: ()/(50-64)   SpO2:  [96 %-100 %]   Arterial Line BP: ()/()        Significant Labs:        Component Value Date/Time    WBC 3.96 07/07/2023 0805    HGB 10.1 (L) 07/07/2023 0805    HCT 29.3 (L) 07/07/2023 0805    HCT 21 (L) 07/06/2023 0024    PLT 43 (L) 07/07/2023 0805     07/07/2023 0245    K 4.3 07/07/2023 0245     (H) 07/07/2023 0245    CO2 24 07/07/2023 0245     (H) 07/07/2023 0245    BUN 18 07/07/2023 0245    CREATININE 0.4 (L) 07/07/2023 0245    MG 2.2 07/07/2023 0245    PHOS 3.3 07/07/2023 0245    CALCIUM 7.3 (L) 07/07/2023 0245    ALBUMIN 1.9 (L) 07/07/2023 0245    PROT 3.4 (L) 07/07/2023 0245    ALKPHOS 40 (L) 07/07/2023 0245    BILITOT 3.3 (H) 07/07/2023 0245    AST 29 07/07/2023 0245    ALT 13 07/07/2023 0245    INR 1.5 (H) 07/07/2023 0805    HGBA1C <4.0 (A) 05/29/2023 0656        Please see Results Review for additional labs.     Diagnostic Studies: All relevant studies, reviewed.      EKG:   Results for orders placed or performed during the hospital encounter of 07/05/23   EKG 12-lead    Collection Time: 07/05/23 12:49 PM    Narrative    Test Reason : E87.5,    Vent. Rate : 103 BPM     Atrial Rate : 103 BPM     P-R Int : 132 ms          QRS Dur : 076 ms      QT Int : 342 ms       P-R-T Axes : 073 054 084 degrees     QTc Int : 448 ms    Age and gender specific analysis  Sinus tachycardia  Low voltage QRS  Low anterior forces and R wave progression  Possibly acute STEMI    ACUTE MI / STEMI    Abnormal ECG  When compared with ECG of 05-JUL-2023 11:07,  No significant change was found  Confirmed by Abimael CLEMENTS MD ([id number]) on 7/5/2023 1:55:55 PM    Referred By: GLORIA   SELF           Confirmed By:Abimael CLEMENTS MD       ECHO:  Results for orders placed during the hospital encounter of 05/28/23    Echo    Interpretation Summary  · The left ventricle is normal in size with hyperdynamic systolic function. The estimated ejection  fraction is 70%.  · Normal right ventricular size with normal right ventricular systolic function.  · Normal left ventricular diastolic function.  · Mild left atrial enlargement.  · Mechanically ventilated; cannot use inferior caval vein diameter to estimate central venous pressure.  · Trivial pericardial effusion.  · There is a left pleural effusion.        Pre-op Assessment    I have reviewed the Patient Summary Reports.     I have reviewed the Nursing Notes. I have reviewed the NPO Status.   I have reviewed the Medications.     Review of Systems  Hematology/Oncology:         -- Anemia:   Renal/:   Chronic Renal Disease    Hepatic/GI:   Liver Disease,        Physical Exam  General: Sedated, intubated  Airway:  Pre-Existing Airway: Oral Endotracheal tube        Anesthesia Plan  Type of Anesthesia, risks & benefits discussed:    Anesthesia Type: Gen ETT  Intra-op Monitoring Plan: Standard ASA Monitors, Art Line and Central Line  Induction:  IV  Informed Consent: Informed consent signed with the Patient representative and all parties understand the risks and agree with anesthesia plan.  All questions answered. Patient consented to blood products? Yes  ASA Score: 4 Emergent  Day of Surgery Review of History & Physical: H&P Update referred to the surgeon/provider.    Ready For Surgery From Anesthesia Perspective.     .

## 2023-07-07 NOTE — TREATMENT PLAN
GI Post Procedure Treatment Plan    Impression:     - Esophageal ulcer with no bleeding and no                          stigmata of recent bleeding.                          - No esophageal varices.                          - Non bleeding gastric ulcer likely due to OG tube                          trauma.                          - Non-bleeding duodenal ulcers with no stigmata of                          bleeding. Small amount of oozing noted. Clip was                          placed for marking.                          - No specimens collected.     Recommendation:                            - NPO.                          -Continue high dose IV PPI.                          - If significant overt GI bleeding occurs then                          consider STAT CTA abdomen and pelvis with bleeding                          protocol and IR consult for possible embolization                          vs empiric embolization by IR targeting the clip                          that was placed adjacent to the duodenal ulcers.     Savanna Lambert MD  Gastroenterology, PGY-4

## 2023-07-07 NOTE — ASSESSMENT & PLAN NOTE
Nutrition consulted. Most recent weight and BMI monitored-     Measurements:  Wt Readings from Last 1 Encounters:   07/07/23 54 kg (119 lb)   Body mass index is 21.77 kg/m².    Patient has been screened and assessed by RD.    Malnutrition Type:  Context: social/environmental circumstances  Level: severe    Malnutrition Characteristic Summary:  Energy Intake (Malnutrition): less than or equal to 75% for greater than or equal to 1 month  Subcutaneous Fat (Malnutrition): severe depletion  Muscle Mass (Malnutrition): severe depletion    Interventions/Recommendations (treatment strategy):  1. When medically able, initiate TF regimen of Impact Peptide 1.5 @ goal rate of 45 mL/hr- provides 1620 kcals, 101 g pro, and 832 mL fluid. 2. If extubated and able to tolerate PO intake before next RD f/u, ADAT to regular- texture per SLP. 3. RD following.

## 2023-07-07 NOTE — ASSESSMENT & PLAN NOTE
-GI EGD clipped duodenal ulcers for IR reference  - Transfuse blood products for active bleeding   - trend CBC and follow  - IR embolized GDA for duodenal hyperemia

## 2023-07-07 NOTE — PROGRESS NOTES
[facility]  Gastroenterology  Progress Note    Patient Name: Marely Hamilton  MRN: [id number]  Admission Date: 7/5/2023  Hospital Length of Stay: 2 days  Code Status: DNR   Attending Provider: Bubba David MD  Consulting Provider: Robina Damian NP  Primary Care Physician: Viktor Ross MD  Principal Problem: <principal problem not specified>    Subjective:     Interval History: Followed up with patient s/p EGD with continued bloody OG output. Duodenal ulcer noted with small ooze. Clip placed. Per nursing, no hematochezia noted, however, significant blood product resuscitation required overnight and this AM. CTA GI Bleed performed this AM. Patient with marginal blood pressures and hypothermic. Was weaned off of pressors while being transfused; now with transient hypotension.     Review of Systems   Unable to perform ROS: Intubated   Objective:     Vital Signs (Most Recent):  Temp: 97.6 °F (36.4 °C) (07/07/23 0900)  Pulse: (!) 59 (07/07/23 0930)  Resp: 18 (07/07/23 0930)  BP: 120/66 (07/07/23 0900)  SpO2: 98 % (07/07/23 0930) Vital Signs (24h Range):  Temp:  [94.1 °F (34.5 °C)-98.6 °F (37 °C)] 97.6 °F (36.4 °C)  Pulse:  [52-72] 59  Resp:  [16-38] 18  SpO2:  [96 %-100 %] 98 %  BP: ()/(45-66) 120/66  Arterial Line BP: ()/() 114/52     Weight: 54 kg (119 lb) (07/07/23 0400)  Body mass index is 21.77 kg/m².      Intake/Output Summary (Last 24 hours) at 7/7/2023 0946  Last data filed at 7/7/2023 0900  Gross per 24 hour   Intake 3252.02 ml   Output 2490 ml   Net 762.02 ml         Lines/Drains/Airways       Central Venous Catheter Line  Duration             Percutaneous Central Line Insertion/Assessment - Triple Lumen  07/07/23 0232 Femoral Vein Right;Femoral Right <1 day              Drain  Duration                  NG/OG Tube 07/06/23 0323 Center mouth 1 day         Urethral Catheter 07/05/23 1332 Double-lumen 1 day              Airway  Duration                  Airway -  Non-Surgical 07/05/23 1220 Endotracheal Tube 1 day              Arterial Line  Duration             Arterial Line 07/07/23 0232 Left Radial <1 day              Peripheral Intravenous Line  Duration                  Peripheral IV - Single Lumen 07/05/23 1600 20 G Anterior;Proximal;Right Forearm 1 day         Peripheral IV - Single Lumen 07/06/23 0546 18 G Anterior;Right Upper Arm 1 day         Peripheral IV - Single Lumen 07/06/23 2202 18 G Anterior;Left Forearm <1 day                     Physical Exam  Vitals and nursing note reviewed.   Constitutional:       Appearance: She is not ill-appearing.   HENT:      Mouth/Throat:      Mouth: Mucous membranes are dry.      Comments: OG in place with bloody contents  Eyes:      General: Scleral icterus present.   Abdominal:      General: Abdomen is flat. Bowel sounds are normal. There is no distension.      Palpations: Abdomen is soft. There is no mass.      Tenderness: There is no abdominal tenderness.      Hernia: No hernia is present.   Musculoskeletal:         General: Swelling present.   Skin:     General: Skin is warm.      Coloration: Skin is jaundiced.      Findings: Bruising present. No erythema.   Neurological:      Comments: Intubated, sedated        Significant Labs:  CBC:   Recent Labs   Lab 07/06/23  2346 07/07/23  0245 07/07/23  0805   WBC 4.13 5.37 3.96   HGB 6.7* 6.6* 10.1*   HCT 20.2* 19.7* 29.3*   PLT 57* 60* 43*       CMP:   Recent Labs   Lab 07/07/23  0245   *   CALCIUM 7.3*   ALBUMIN 1.9*   PROT 3.4*      K 4.3   CO2 24   *   BUN 18   CREATININE 0.4*   ALKPHOS 40*   ALT 13   AST 29   BILITOT 3.3*       Coagulation:   Recent Labs   Lab 07/07/23  0805   INR 1.5*   APTT 34.7*           Significant Imaging:  Imaging results within the past 24 hours have been reviewed.    EGD 7/6/2023  Impression:            - Esophageal ulcer with no bleeding and no                          stigmata of recent bleeding.                          - No  esophageal varices.                          - Non bleeding gastric ulcer likely due to OG tube                          trauma.                          - Non-bleeding duodenal ulcers with no stigmata of                          bleeding. Small amount of oozing noted. Clip was                          placed for marking.                          - No specimens collected.   Recommendation:        - Return patient to ICU for ongoing care.                          - NPO.                          -Continue high dose IV PPI.                          - If significant overt GI bleeding occurs then                          consider STAT CTA abdomen and pelvis with bleeding                          protocol and IR consult for possible embolization                          vs empiric embolization by IR targeting the clip                          that was placed adjacent to the duodenal ulcers.   Attending Participation:        I personally performed the entire procedure.   Bernice Guillen MD   7/6/2023 7:52:51 PM     Assessment/Plan:     Oncology  Acute blood loss anemia  --Defer to IR for empiracal embolization of duodenal ulcer; clip present as marker  --Trend CBC as clinically indicated, Transfused for Hgb <7.0  --Correct coagulopathy for INR <2.0 and Platelets >50K  --Please maintain 2 large bore IV's  --PPI IV BID          Thank you for your consult. Please call the GI team for any further questions or concerns.     Robina Damian NP  Gastroenterology  [facility]

## 2023-07-07 NOTE — CONSULTS
IR Embolization Consult Note  Interventional Radiology    Consult Requested By: Phong Jean DO   Reason for Consult: Empiric embolization near clip inserted by GI per EGD, pt is having hematochezia and bleeding from OG tube     SUBJECTIVE:     Chief Complaint:  a/w GIB s/p EGD with clipping of duodenal ulcer, now still with ongoing GIB, request embolization    History of Present Illness:  Marely Hamilton is a 58 y.o. female with a PMHx of alcoholic cirrhosis, seizure disorder, recent RP hemorrhage s/p embolization 6/11/23, DVT s/p IVC filter placement who was admitted on 7/5/23 for GIB. Hospital course notable for EGD on 7/6/23 which revealed a non-bleeding esophageal ulcer, a non-bleeding gastric ulcer likely 2/2 OG tube trauma, and non-bleeding duodenal ulcers which were clipped. Pt with ongoing hematochezia and drop in Hgb 6.6 from 7.6 despite EGD yesterday. Interventional Radiology has been consulted for IR angiogram with embolization for management of  bleeding duodenal ulcers . Pt has had recent imaging including a CTA a/p on 7/7/23 which did not reveal active contrast extravasation suggestive of ongoing active GIB. The pt's H/H is currently 10.1/29.3 after receiving 2 u pRBC this AM. She is intubated and on pressors in the SICU.     Review of Systems   Unable to perform ROS: Intubated     Scheduled Meds:   cefTRIAXone (ROCEPHIN) IVPB  1 g Intravenous Q24H    levETIRAcetam (Keppra) IV (PEDS and ADULTS)  1,000 mg Intravenous Q12H    mupirocin   Nasal BID    pantoprazole  40 mg Intravenous BID     Continuous Infusions:   dextrose 5 % and 0.45 % NaCl      NORepinephrine bitartrate-D5W Stopped (07/07/23 0626)    propofoL 25 mcg/kg/min (07/07/23 0800)     PRN Meds:sodium chloride, sodium chloride, dextrose 10%, dextrose 10%, dextrose 10%, dextrose 5 % and 0.45 % NaCl, glucagon (human recombinant), insulin aspart U-100, sodium chloride 0.9%    Review of patient's allergies indicates:   Allergen Reactions    Sulfa  (sulfonamide antibiotics) Rash    Codeine Nausea And Vomiting       Past Medical History:   Diagnosis Date    Addiction to drug     Alcohol abuse     Alcohol abuse, in remission 6/15/2015    14.5 weeks ago; AA weekly    Anemia     Anxiety 6/15/2015    Behavioral problem     Bipolar disorder     Bipolar disorder in remission 6/15/2015    Cirrhosis, Laennec's 6/15/2015    Depression     Encounter for blood transfusion     Epistaxis 6/15/2015    Fatigue     Glaucoma     Hematuria     Hepatic encephalopathy 6/15/2015    Hepatic enlargement     History of psychiatric care     History of psychiatric hospitalization     History of seizure 6/15/2015    1    hx of intentional Tylenol overdose 6/15/2015    2005- situational and hx of bipolar    Hx of psychiatric care     Macrocytic anemia 9/18/2015    6 units PRBC since June 2015    Jeana     Osteoarthritis     Other ascites 6/15/2015    Psychiatric exam requested by authority     Psychiatric problem     Psychosis 9/26/2019    Renal disorder     Seizures     Self-harming behavior     Suicide attempt     Therapy     Thrombocytopenia 6/15/2015     Past Surgical History:   Procedure Laterality Date    COSMETIC SURGERY      EMBOLIZATION N/A 6/11/2023    Procedure: EMBOLIZATION, BLOOD VESSEL;  Surgeon: Debbie Surgeon;  Location: [facility];  Service: Anesthesiology;  Laterality: N/A;    ESOPHAGOGASTRODUODENOSCOPY      ESOPHAGOGASTRODUODENOSCOPY N/A 7/6/2023    Procedure: EGD (ESOPHAGOGASTRODUODENOSCOPY);  Surgeon: Bernice Guillen MD;  Location: [address]);  Service: Endoscopy;  Laterality: N/A;     Family History   Problem Relation Age of Onset    Alcohol abuse Father     Suicide Father     Bipolar disorder Father     Alcohol abuse Sister     Hypertension Mother     Alcohol abuse Paternal Grandfather     Drug abuse Neg Hx     Dementia Neg Hx      Social History     Tobacco Use    Smoking status: Never    Smokeless tobacco: Never   Substance Use Topics    Alcohol use: Not  Currently     Comment: hx of ETOH abuse with cirrhosis of liver    Drug use: No       OBJECTIVE:     Vital Signs (Most Recent)  Temp: (!) 94.1 °F (34.5 °C) (07/07/23 0800)  Pulse: (!) 56 (07/07/23 0845)  Resp: (!) 30 (07/07/23 0845)  BP: 129/64 (07/07/23 0708)  SpO2: 97 % (07/07/23 0845)    Physical Exam:  Physical Exam  Vitals and nursing note reviewed.   Constitutional:       General: She is not in acute distress.     Appearance: She is ill-appearing.   HENT:      Head: Normocephalic and atraumatic.      Mouth/Throat:      Comments: Orogastric tube in place with bloody output  Pulmonary:      Effort: Pulmonary effort is normal. No respiratory distress.      Comments: intubated  Skin:     General: Skin is warm and dry.      Coloration: Skin is not jaundiced.   Neurological:      Comments: sedated       Laboratory  I have reviewed all pertinent lab results within the past 24 hours.  CBC:   Recent Labs   Lab 07/07/23 0805   WBC 3.96   RBC 3.37*   HGB 10.1*   HCT 29.3*   PLT 43*   MCV 87   MCH 30.0   MCHC 34.5     BMP:   Recent Labs   Lab 07/07/23  0245   *      K 4.3   *   CO2 24   BUN 18   CREATININE 0.4*   CALCIUM 7.3*   MG 2.2     CMP:   Recent Labs   Lab 07/07/23  0245   *   CALCIUM 7.3*   ALBUMIN 1.9*   PROT 3.4*      K 4.3   CO2 24   *   BUN 18   CREATININE 0.4*   ALKPHOS 40*   ALT 13   AST 29   BILITOT 3.3*     LFTs:   Recent Labs   Lab 07/07/23  0245   ALT 13   AST 29   ALKPHOS 40*   BILITOT 3.3*   PROT 3.4*   ALBUMIN 1.9*     Coagulation:   Recent Labs   Lab 07/07/23  0805   LABPROT 15.4*   INR 1.5*   APTT 34.7*     Microbiology Results (last 7 days)       ** No results found for the last 168 hours. **            ASA/Mallampati  ASA: 3  Mallampati: 2    Imaging:  Recent imaging studies including CTA a/p on 7/7/23 which was independently reviewed by Florentino Contreras MD.     EXAMINATION:  CTA ACUTE GI BLEED, ABDOMEN AND PELVIS     CLINICAL HISTORY:  bright red blood per OGT,  has already had scope and recommend by GI bleed. increasing presser requirement;     TECHNIQUE:  CT images of the abdomen and pelvis were obtained prior to and after the IV administration of 70 mL of Omnipaque 350 .  Oral contrast was not given. Post-contrast images were obtained in the arterial and delayed phases per GI bleed protocol.  Axial, coronal, and sagittal reconstructions were created from the source data.     COMPARISON:  CTA GI bleed, 07/05/2023.     FINDINGS:  Lower Chest:     Bilateral pleural effusions.  Heart is mildly enlarged.  Probable passive atelectasis in the lung bases, right side greater than left.     Abdomen:     Evaluation is limited by extensive streak artifact due to the patient's arms overlying the field of view.     Nodular contour of the liver suggestive of underlying cirrhosis.  Liver is stable.  There are multiple calcified gallstones.  No intrahepatic biliary ductal dilatation.     Spleen is borderline enlarged.  Adrenal glands and pancreas are unremarkable.     The kidneys are symmetric in size.  Subtle patchy cortical hypoenhancement in the superior pole of the right kidney which appears new from prior study.  No hydronephrosis. Small bilateral simple appearing cysts.     No small bowel obstruction.  Enteric tube is present in the stomach.  Surgical clip is noted in the proximal duodenum.  Mild diffuse gastric wall thickening.  Incidental note of hyperdense stool in the colon.  No convincing findings of active GI hemorrhage.     No pneumoperitoneum or organized fluid collection.  No portal venous gas.  Small volume abdominopelvic free fluid.     No bulky retroperitoneal lymphadenopathy.     Abdominal aorta is normal in caliber without significant atherosclerosis.     Portal vein is patent.  Portal venous system and splenic veins are enlarged suggestive of portal hypertension.  There is a large collateral vessel in the right peritoneum and retroperitoneum which communicates with  the IVC similar to prior exam.  Additional smaller upper abdominal collateral vessels are present.     Large hematoma in the left retroperitoneum measures approximately 11 x 9 cm in size (series 8, image 117; previously 12 x 8 cm).  Similar size of smaller left iliacus hematoma.  This measures up to 6 cm in transverse width, similar to prior.  No contrast extravasation within the hematomas to suggest active hemorrhage.  A right femoral catheter is present with the tip slightly extending beyond the main vessel wall likely within a collateral vessel (series 5, image 134).  No hematoma surrounding the right common iliac vein.  Suggest correlation with catheter function.     Pelvis:     Urinary bladder is decompressed around a Saldaña catheter and demonstrates significant wall thickening.  Small to moderate volume pelvic free fluid.  Rectum is unremarkable.     Bones and soft tissues:     No aggressive osseous lesions.  Left total hip arthroplasty.  Degenerative changes in the spine.  Diffuse body wall edema.     Impression:     No convincing findings of active GI bleed.  Similar appearance of large left retroperitoneal and left iliacus hematomas without active extravasation to indicate active arterial hemorrhage.     Urinary bladder wall thickening and questionable cortical hypoenhancement in the superior pole of the right kidney.  Suggest correlation for urinary tract infection/pyelonephritis.     Cirrhotic liver morphology and stigmata of portal hypertension including splenomegaly, small volume abdominopelvic free fluid, and collateral vessels.     Anasarca.     Cholelithiasis.     Gastric wall thickening with interval placement of clip in the proximal duodenum.  Suggest correlation with recent endoscopy.     Bilateral pleural effusions, right side greater than left, worse from prior study.     Additional findings discussed in the body of the report.        Electronically signed by: Marvin Urban MD  Date:                                             07/07/2023  Time:                                           07:30    ASSESSMENT/PLAN:     Assessment:  58 y.o. female with a PMHx of alcoholic cirrhosis, seizure disorder, recent RP hemorrhage s/p embolization 6/11/23, DVT s/p IVC filter placementwho has been referred to IR for angiogram with embolization for management of  bleeding duodenal ulcers . The procedure was discussed in great detail with the patient's sister including thorough explanations of the potential risks and benefits of angiogram with embolization. Risks include sepsis, severe infection, hemorrhage, damage to the artery, damage to surrounding organs, pseudoaneurysm, non target embolization, contrast allergy and death. The patient is a candidate for angiogram with embolization under MAC sedation. Plan discussed with ordering physician.    Plan:  Will proceed with angiogram with embolization under MAC sedation on 7/7/23.   Consent obtained from pt's sister  Please keep pt NPO   Anticoagulation history reviewed.   Coagulation labs reviewed.  Thank you for the consult. Please contact with questions via [employer] secure chat or spectra link    Heaven Diamond PA-C  Interventional Radiology  Spectra: [id number]

## 2023-07-07 NOTE — PROGRESS NOTES
[facility] - Cardiac Medical ICU  Critical Care Medicine  Progress Note    Patient Name: Marely Hamilton  MRN: [id number]  Admission Date: 7/5/2023  Hospital Length of Stay: 2 days  Code Status: DNR  Attending Provider: Bubba David MD  Primary Care Provider: Viktor Ross MD   Principal Problem: <principal problem not specified>    Subjective:     HPI:  Marely Hamilton is a 58 y.o. female with history of alcoholic cirrhosis, seizure disorder, DVT and IJ thrombus with recent admission for large retroperitoneal hemorrhage requiring IR embolization and IVC filter placement who presents for hematochezia. Onset this morning at [facility], alida blood per rectum per chart, sent to ED. Initially normotensive but then severe hypotension prompted initiation of levophed and intubation. Hgb 6.8, 2u pRBC given + 1u FFP ordered. Hgb 8.5 on 7/2. On levo and vaso currently. K 6.6 but renal function at baseline. Repeat pending. No prior EGDs on record.      Hospital/ICU Course:  Patient was seen at bedside, hematochezia still present post op by IR. HgB trended downward from 10 to 7.3. Platelets and pRBCs were given to the pt. Pt pressor requirement increased. Discussed with GI and IR regarding active bleeding post op, IR indicated there is not much else to do from their end bc they embolized a significant part of GDA. GI indicated there is no active plans to scope her again today. Discuss with sister plans moving forward.           Interval History/Significant Events: Pt still actively bleeding. Transfused blood. Discussed with GI and IR, no current procedural plans. Dicussed with sister plans moving forward.     Review of Systems   Unable to perform ROS: Patient unresponsive   Objective:     Vital Signs (Most Recent):  Temp: 96.2 °F (35.7 °C) (07/07/23 1500)  Pulse: 68 (07/07/23 1647)  Resp: 18 (07/07/23 1647)  BP: (!) 145/75 (07/07/23 1200)  SpO2: 100 % (07/07/23 1647) Vital Signs (24h Range):  Temp:  [94.1 °F (34.5 °C)-98.6 °F (37 °C)]  96.2 °F (35.7 °C)  Pulse:  [51-72] 68  Resp:  [15-38] 18  SpO2:  [96 %-100 %] 100 %  BP: ()/(50-75) 145/75  Arterial Line BP: ()/() 106/52   Weight: 54 kg (119 lb)  Body mass index is 21.77 kg/m².      Intake/Output Summary (Last 24 hours) at 7/7/2023 1725  Last data filed at 7/7/2023 1600  Gross per 24 hour   Intake 4089.23 ml   Output 2595 ml   Net 1494.23 ml            Physical Exam  Constitutional:       Appearance: She is ill-appearing and toxic-appearing.      Interventions: She is intubated.   HENT:      Head: Normocephalic.   Eyes:      General: Scleral icterus present.   Neck:      Thyroid: No thyroid mass or thyroid tenderness.      Vascular: No carotid bruit or hepatojugular reflux.   Cardiovascular:      Rate and Rhythm: Normal rate.      Pulses: Decreased pulses.      Heart sounds: Heart sounds are distant.   Pulmonary:      Effort: She is intubated.   Chest:      Chest wall: No mass, lacerations or deformity.   Breasts:     Right: No swelling.      Left: No swelling.   Abdominal:      General: Abdomen is flat.      Palpations: Abdomen is rigid.      Hernia: No hernia is present.   Genitourinary:     Vagina: Normal.      Comments: Copius amount of bright blood exiting rectum   Musculoskeletal:      Cervical back: Rigidity present.   Lymphadenopathy:      Cervical: No cervical adenopathy.   Skin:     Coloration: Skin is jaundiced.      Findings: Ecchymosis present.   Neurological:      Mental Status: She is unresponsive.          Vents:  Vent Mode: A/C (07/07/23 1647)  Ventilator Initiated: Yes (07/05/23 1221)  Set Rate: 18 BPM (07/07/23 1647)  Vt Set: 300 mL (07/07/23 1647)  PEEP/CPAP: 5 cmH20 (07/07/23 1647)  Oxygen Concentration (%): 40 (07/07/23 1647)  Peak Airway Pressure: 22 cmH20 (07/07/23 1647)  Plateau Pressure: 0 cmH20 (07/07/23 1647)  Total Ve: 5.9 L/m (07/07/23 1647)  Negative Inspiratory Force (cm H2O): 0 (07/07/23 1647)  F/VT Ratio<105 (RSBI): (!) 65.69 (07/07/23  1647)  Lines/Drains/Airways       Central Venous Catheter Line  Duration             Percutaneous Central Line Insertion/Assessment - Triple Lumen  07/07/23 0232 Femoral Vein Right;Femoral Right <1 day              Drain  Duration                  Urethral Catheter 07/05/23 1332 Double-lumen 2 days         NG/OG Tube 07/06/23 0323 Center mouth 1 day              Airway  Duration                  Airway - Non-Surgical 07/05/23 1220 Endotracheal Tube 2 days              Arterial Line  Duration             Arterial Line 07/07/23 0232 Left Radial <1 day              Peripheral Intravenous Line  Duration                  Peripheral IV - Single Lumen 07/05/23 1600 20 G Anterior;Proximal;Right Forearm 2 days         Peripheral IV - Single Lumen 07/06/23 0546 18 G Anterior;Right Upper Arm 1 day         Peripheral IV - Single Lumen 07/06/23 2202 18 G Anterior;Left Forearm <1 day                  Significant Labs:    CBC/Anemia Profile:  Recent Labs   Lab 07/07/23  1226 07/07/23  1354 07/07/23  1614   WBC 3.42* 4.57 5.69   HGB 9.6* 8.3* 7.3*   HCT 28.0* 24.4* 21.2*   PLT 49* 62* 96*   MCV 86 88 87   RDW 16.2* 16.2* 16.5*          Chemistries:  Recent Labs   Lab 07/06/23  1625 07/06/23  2346 07/07/23  0245 07/07/23  1614     --  143 147*   K 3.3*  --  4.3 4.4   *  --  114* 119*   CO2 22*  --  24 24   BUN 28*  --  18 14   CREATININE 0.5  --  0.4* 0.4*   CALCIUM 7.9*  --  7.3* 7.4*   ALBUMIN 2.4*  --  1.9* 1.9*   PROT 4.5*  --  3.4* 3.5*   BILITOT 4.0*  --  3.3* 4.1*   ALKPHOS 52*  --  40* 46*   ALT 17  --  13 13   AST 35  --  29 28   MG 1.9 2.4 2.2 2.1   PHOS 2.0*  --  3.3 2.7         All pertinent labs within the past 24 hours have been reviewed.    Significant Imaging:  I have reviewed all pertinent imaging results/findings within the past 24 hours.      ABG  Recent Labs   Lab 07/07/23  0226   PH 7.484*   PO2 152*   PCO2 30.3*   HCO3 22.8*   BE -1     Assessment/Plan:     Oncology  Acute blood loss anemia  -  Maintain IV access with 2 large bore Ivs  - Intravascular resuscitation/support with IVFs   - Hold all NSAIDs and anticoagulants, unless contraindicated.  - Please correct any coagulopathy with platelets and FFP for goal of platelets >50K and INR <2.0  - Please notify GI team if there is significant change in patient's clinical status         Endocrine  Severe protein-calorie malnutrition  Nutrition consulted. Most recent weight and BMI monitored-     Measurements:  Wt Readings from Last 1 Encounters:   07/07/23 54 kg (119 lb)   Body mass index is 21.77 kg/m².    Patient has been screened and assessed by RD.    Malnutrition Type:  Context: social/environmental circumstances  Level: severe    Malnutrition Characteristic Summary:  Energy Intake (Malnutrition): less than or equal to 75% for greater than or equal to 1 month  Subcutaneous Fat (Malnutrition): severe depletion  Muscle Mass (Malnutrition): severe depletion    Interventions/Recommendations (treatment strategy):  1. When medically able, initiate TF regimen of Impact Peptide 1.5 @ goal rate of 45 mL/hr- provides 1620 kcals, 101 g pro, and 832 mL fluid. 2. If extubated and able to tolerate PO intake before next RD f/u, ADAT to regular- texture per SLP. 3. RD following.    GI  Gastrointestinal hemorrhage associated with duodenal ulcer  See above     Hematochezia  -GI EGD clipped duodenal ulcers for IR reference  - Transfuse blood products for active bleeding   - trend CBC and follow  - IR embolized GDA for duodenal hyperemia     Other  Retroperitoneal bleed  -- CTA f/u  --CTA demonstrated stable retroperitoneal hematoma       Critical Care Daily Checklist:    A: Awake: RASS Goal/Actual Goal: RASS Goal: -2-->light sedation  Actual:     B: Spontaneous Breathing Trial Performed?     C: SAT & SBT Coordinated?  n/a                      D: Delirium: CAM-ICU Overall CAM-ICU: Positive   E: Early Mobility Performed? Yes   F: Feeding Goal: Goals: Will meet % EEN/EPN  by next RD f/u.  Status: Nutrition Goal Status: new   Current Diet Order   Procedures    Diet NPO      AS: Analgesia/Sedation Propofol   T: Thromboembolic Prophylaxis n/a   H: HOB > 300 Yes   U: Stress Ulcer Prophylaxis (if needed) Pantoprazole   G: Glucose Control SSI   B: Bowel Function Stool Occurrence: 1   I: Indwelling Catheter (Lines & Saldaña) Necessity yes   D: De-escalation of Antimicrobials/Pharmacotherapies n/a    Plan for the day/ETD Follow CBC and transfuse    Code Status:  Family/Goals of Care: DNR  n/a       Critical secondary to Patient has a condition that poses threat to life and bodily function: Hemorraghic shock      Critical care was time spent personally by me on the following activities: development of treatment plan with patient or surrogate and bedside caregivers, discussions with consultants, evaluation of patient's response to treatment, examination of patient, ordering and performing treatments and interventions, ordering and review of laboratory studies, ordering and review of radiographic studies, pulse oximetry, re-evaluation of patient's condition. This critical care time did not overlap with that of any other provider or involve time for any procedures.     Phong Jean,   Critical Care Medicine  [facility] - Cardiac Medical ICU

## 2023-07-07 NOTE — NURSING
Report called to Carlos MICU RN.  Patient currently with anesthesia in IR and plan is to transfer patient to MICU [id number] post procedure.  IR RN aware.  Chart and medications sent to IR with patient.  Zaria (sister) updated.  Questions and concerns addressed.

## 2023-07-07 NOTE — PROCEDURES
VIR Post-Procedure Note    Pre Op Diagnosis: GI bleed    Post Op Diagnosis: Duodenal hyperemia without active bleed    Procedure: Mesenteric angiography    Procedure performed by: Dr Cameron Holloway and Dr Siva Schwartz    Written Informed Consent Obtained: Yes    Specimen Removed: NO    Estimated Blood Loss: Minimal    Findings: Patient intubated and sedated by ICU team    The right femoral artery was cannulated with a 5-Ukrainian sheath.  An arterial catheter was introduced and visceral angiography performed using iodinated contrast including the celiac and superior mesenteric arteries. Duodenal hyperemia was noted without active bleed. Coil embolization of the GDA was performed for complete stasis.Replaced right hepatic artery was noted off the SMA    The catheter was removed and right femoral artery hemostasis was achieved using Angioseal closure device without hematoma. the patient tolerated the procedure well and there were no complications.  Please see Imaging report for further details.      Siva Schwartz MD  Vascular/Interventional Radiology Fellow

## 2023-07-07 NOTE — ASSESSMENT & PLAN NOTE
--Defer to IR for empiracal embolization of duodenal ulcer; clip present as marker  --Trend CBC as clinically indicated, Transfused for Hgb <7.0  --Correct coagulopathy for INR <2.0 and Platelets >50K  --Please maintain 2 large bore IV's  --PPI IV BID

## 2023-07-07 NOTE — PROCEDURES
"Marely Hamilton is a 58 y.o. female patient.    Temp: 98.1 °F (36.7 °C) (23 2315)  Pulse: 65 (23 0000)  Resp: (!) 22 (23 0000)  BP: (!) 99/55 (23 2345)  SpO2: 100 % (23 0000)  Weight: 56 kg (123 lb 7.3 oz) (23 1324)  Height: 5' 2" (157.5 cm) (23 1324)       Arterial Line    Date/Time: 2023 1:32 AM  Location procedure was performed: [facility] CRITICAL CARE MEDICINE  Performed by: Emma Gonzalez DNP  Authorized by: Emma Gonzaelz DNP   Pre-op Diagnosis: Shock  Post-operative diagnosis: Shock  Consent Done: Yes  Consent: Written consent obtained.  Consent given by: sibling  Patient understanding: patient states understanding of the procedure being performed  Patient consent: the patient's understanding of the procedure matches consent given  Procedure consent: procedure consent matches procedure scheduled  Site marked: the operative site was marked  Patient identity confirmed: , name, provided demographic data and MRN  Time out: Immediately prior to procedure a "time out" was called to verify the correct patient, procedure, equipment, support staff and site/side marked as required.  Preparation: Patient was prepped and draped in the usual sterile fashion.  Indications: multiple ABGs and hemodynamic monitoring  Location: left radial  Dequan's test normal: yes  Needle gauge: 20  Seldinger technique: Seldinger technique used  Cutdown: cutdown required  Number of attempts: 1  Complications: No  Post-procedure: line sutured and dressing applied  Post-procedure CMS: unchanged  Patient tolerance: Patient tolerated the procedure well with no immediate complications      WILLIAM Watkins  Critical Care Medicine  2023 1:34 AM        "

## 2023-07-07 NOTE — NURSING
Anesthesia and IR RN at bedside.  Updated on patient status.  Patient placed on portable vent and portable monitor for transport.  Zaria (sister) updated on patient transport to procedure.  Questions and concerns addressed.

## 2023-07-07 NOTE — SUBJECTIVE & OBJECTIVE
Interval History/Significant Events: Pt still actively bleeding. Transfused blood. Discussed with GI and IR, no current procedural plans. Dicussed with sister plans moving forward.     Review of Systems   Unable to perform ROS: Patient unresponsive   Objective:     Vital Signs (Most Recent):  Temp: 96.2 °F (35.7 °C) (07/07/23 1500)  Pulse: 68 (07/07/23 1647)  Resp: 18 (07/07/23 1647)  BP: (!) 145/75 (07/07/23 1200)  SpO2: 100 % (07/07/23 1647) Vital Signs (24h Range):  Temp:  [94.1 °F (34.5 °C)-98.6 °F (37 °C)] 96.2 °F (35.7 °C)  Pulse:  [51-72] 68  Resp:  [15-38] 18  SpO2:  [96 %-100 %] 100 %  BP: ()/(50-75) 145/75  Arterial Line BP: ()/() 106/52   Weight: 54 kg (119 lb)  Body mass index is 21.77 kg/m².      Intake/Output Summary (Last 24 hours) at 7/7/2023 1725  Last data filed at 7/7/2023 1600  Gross per 24 hour   Intake 4089.23 ml   Output 2595 ml   Net 1494.23 ml            Physical Exam  Constitutional:       Appearance: She is ill-appearing and toxic-appearing.      Interventions: She is intubated.   HENT:      Head: Normocephalic.   Eyes:      General: Scleral icterus present.   Neck:      Thyroid: No thyroid mass or thyroid tenderness.      Vascular: No carotid bruit or hepatojugular reflux.   Cardiovascular:      Rate and Rhythm: Normal rate.      Pulses: Decreased pulses.      Heart sounds: Heart sounds are distant.   Pulmonary:      Effort: She is intubated.   Chest:      Chest wall: No mass, lacerations or deformity.   Breasts:     Right: No swelling.      Left: No swelling.   Abdominal:      General: Abdomen is flat.      Palpations: Abdomen is rigid.      Hernia: No hernia is present.   Genitourinary:     Vagina: Normal.      Comments: Copius amount of bright blood exiting rectum   Musculoskeletal:      Cervical back: Rigidity present.   Lymphadenopathy:      Cervical: No cervical adenopathy.   Skin:     Coloration: Skin is jaundiced.      Findings: Ecchymosis present.    Neurological:      Mental Status: She is unresponsive.          Vents:  Vent Mode: A/C (07/07/23 1647)  Ventilator Initiated: Yes (07/05/23 1221)  Set Rate: 18 BPM (07/07/23 1647)  Vt Set: 300 mL (07/07/23 1647)  PEEP/CPAP: 5 cmH20 (07/07/23 1647)  Oxygen Concentration (%): 40 (07/07/23 1647)  Peak Airway Pressure: 22 cmH20 (07/07/23 1647)  Plateau Pressure: 0 cmH20 (07/07/23 1647)  Total Ve: 5.9 L/m (07/07/23 1647)  Negative Inspiratory Force (cm H2O): 0 (07/07/23 1647)  F/VT Ratio<105 (RSBI): (!) 65.69 (07/07/23 1647)  Lines/Drains/Airways       Central Venous Catheter Line  Duration             Percutaneous Central Line Insertion/Assessment - Triple Lumen  07/07/23 0232 Femoral Vein Right;Femoral Right <1 day              Drain  Duration                  Urethral Catheter 07/05/23 1332 Double-lumen 2 days         NG/OG Tube 07/06/23 0323 Center mouth 1 day              Airway  Duration                  Airway - Non-Surgical 07/05/23 1220 Endotracheal Tube 2 days              Arterial Line  Duration             Arterial Line 07/07/23 0232 Left Radial <1 day              Peripheral Intravenous Line  Duration                  Peripheral IV - Single Lumen 07/05/23 1600 20 G Anterior;Proximal;Right Forearm 2 days         Peripheral IV - Single Lumen 07/06/23 0546 18 G Anterior;Right Upper Arm 1 day         Peripheral IV - Single Lumen 07/06/23 2202 18 G Anterior;Left Forearm <1 day                  Significant Labs:    CBC/Anemia Profile:  Recent Labs   Lab 07/07/23  1226 07/07/23  1354 07/07/23  1614   WBC 3.42* 4.57 5.69   HGB 9.6* 8.3* 7.3*   HCT 28.0* 24.4* 21.2*   PLT 49* 62* 96*   MCV 86 88 87   RDW 16.2* 16.2* 16.5*          Chemistries:  Recent Labs   Lab 07/06/23  1625 07/06/23  2346 07/07/23  0245 07/07/23  1614     --  143 147*   K 3.3*  --  4.3 4.4   *  --  114* 119*   CO2 22*  --  24 24   BUN 28*  --  18 14   CREATININE 0.5  --  0.4* 0.4*   CALCIUM 7.9*  --  7.3* 7.4*   ALBUMIN 2.4*  --   1.9* 1.9*   PROT 4.5*  --  3.4* 3.5*   BILITOT 4.0*  --  3.3* 4.1*   ALKPHOS 52*  --  40* 46*   ALT 17  --  13 13   AST 35  --  29 28   MG 1.9 2.4 2.2 2.1   PHOS 2.0*  --  3.3 2.7         All pertinent labs within the past 24 hours have been reviewed.    Significant Imaging:  I have reviewed all pertinent imaging results/findings within the past 24 hours.

## 2023-07-07 NOTE — NURSING
Patient arrived to room 6085 via stretcher with surgery team. Patient on mechanical ventilation FiO2:40%, peep: 5,rate:18, TV: 300. Current gtts; levophed infusion @0.08. Patient placed on continuous cardiac monitor, automatic blood pressure cuff and continuous pulse oximeter. Team made aware of patients arrival.

## 2023-07-07 NOTE — PROVATION PATIENT INSTRUCTIONS
Discharge Summary/Instructions after an Endoscopic Procedure  Patient Name: Marely Hamilton  Patient MRN: [id number]  Patient YOB: 1965 Thursday, July 6, 2023  Bernice Guillen MD  Dear patient,  As a result of recent federal legislation (The Federal Cures Act), you may   receive lab or pathology results from your procedure in your [facility]   account before your physician is able to contact you. Your physician or   their representative will relay the results to you with their   recommendations at their soonest availability.  Thank you,  RESTRICTIONS:  During your procedure today, you received medications for sedation.  These   medications may affect your judgment, balance and coordination.  Therefore,   for 24 hours, you have the following restrictions:   - DO NOT drive a car, operate machinery, make legal/financial decisions,   sign important papers or drink alcohol.    ACTIVITY:  Today: no heavy lifting, straining or running due to procedural   sedation/anesthesia.  The following day: return to full activity including work.  DIET:  Eat and drink normally unless instructed otherwise.     TREATMENT FOR COMMON SIDE EFFECTS:  - Mild abdominal pain, nausea, belching, bloating or excessive gas:  rest,   eat lightly and use a heating pad.  - Sore Throat: treat with throat lozenges and/or gargle with warm salt   water.  - Because air was used during the procedure, expelling large amounts of air   from your rectum or belching is normal.  - If a bowel prep was taken, you may not have a bowel movement for 1-3 days.    This is normal.  SYMPTOMS TO WATCH FOR AND REPORT TO YOUR PHYSICIAN:  1. Abdominal pain or bloating, other than gas cramps.  2. Chest pain.  3. Back pain.  4. Signs of infection such as: chills or fever occurring within 24 hours   after the procedure.  5. Rectal bleeding, which would show as bright red, maroon, or black stools.   (A tablespoon of blood from the rectum is not serious, especially if    hemorrhoids are present.)  6. Vomiting.  7. Weakness or dizziness.  GO DIRECTLY TO THE NEAREST EMERGENCY ROOM IF YOU HAVE ANY OF THE FOLLOWING:      Difficulty breathing              Chills and/or fever over 101 F   Persistent vomiting and/or vomiting blood   Severe abdominal pain   Severe chest pain   Black, tarry stools   Bleeding- more than one tablespoon   Any other symptom or condition that you feel may need urgent attention  Your doctor recommends these additional instructions:  If any biopsies were taken, your doctors clinic will contact you in 1 to 2   weeks with any results.  - Return patient to ICU for ongoing care.   - NPO.   -Continue high dose IV PPI.  - If significant overt GI bleeding occurs then consider STAT CTA abdomen and   pelvis with bleeding protocol and IR consult for possible embolization vs   empiric embolization by IR targeting the clip that was placed adjacent to   the duodenal ulcers.  For questions, problems or results please call your physician - Bernice Guillen MD at Work:  [phone].  [facility] [place], EMERGENCY ROOM PHONE NUMBER: [phone]  IF A COMPLICATION OR EMERGENCY SITUATION ARISES AND YOU ARE UNABLE TO REACH   YOUR PHYSICIAN - GO DIRECTLY TO THE EMERGENCY ROOM.  Bernice Guillen MD  7/6/2023 7:52:51 PM  This report has been verified and signed electronically.  Dear patient,  As a result of recent federal legislation (The Federal Cures Act), you may   receive lab or pathology results from your procedure in your [facility]   account before your physician is able to contact you. Your physician or   their representative will relay the results to you with their   recommendations at their soonest availability.  Thank you,  PROVATION

## 2023-07-08 LAB
ABO + RH BLD: NORMAL
ALBUMIN SERPL BCP-MCNC: 1.9 G/DL (ref 3.5–5.2)
ALBUMIN SERPL BCP-MCNC: 2 G/DL (ref 3.5–5.2)
ALLENS TEST: ABNORMAL
ALP SERPL-CCNC: 49 U/L (ref 55–135)
ALP SERPL-CCNC: 54 U/L (ref 55–135)
ALT SERPL W/O P-5'-P-CCNC: 12 U/L (ref 10–44)
ALT SERPL W/O P-5'-P-CCNC: 15 U/L (ref 10–44)
ANION GAP SERPL CALC-SCNC: 2 MMOL/L (ref 8–16)
ANION GAP SERPL CALC-SCNC: 4 MMOL/L (ref 8–16)
APTT PPP: 28.7 SEC (ref 21–32)
APTT PPP: 29.6 SEC (ref 21–32)
APTT PPP: 29.9 SEC (ref 21–32)
APTT PPP: 32.4 SEC (ref 21–32)
AST SERPL-CCNC: 27 U/L (ref 10–40)
AST SERPL-CCNC: 28 U/L (ref 10–40)
BASOPHILS # BLD AUTO: 0.01 K/UL (ref 0–0.2)
BASOPHILS # BLD AUTO: 0.01 K/UL (ref 0–0.2)
BASOPHILS # BLD AUTO: 0.02 K/UL (ref 0–0.2)
BASOPHILS NFR BLD: 0.1 % (ref 0–1.9)
BASOPHILS NFR BLD: 0.1 % (ref 0–1.9)
BASOPHILS NFR BLD: 0.3 % (ref 0–1.9)
BILIRUB SERPL-MCNC: 3.1 MG/DL (ref 0.1–1)
BILIRUB SERPL-MCNC: 3.2 MG/DL (ref 0.1–1)
BLD GP AB SCN CELLS X3 SERPL QL: NORMAL
BUN SERPL-MCNC: 16 MG/DL (ref 6–20)
BUN SERPL-MCNC: 20 MG/DL (ref 6–20)
CA-I BLDV-SCNC: 1.15 MMOL/L (ref 1.06–1.42)
CA-I BLDV-SCNC: 1.18 MMOL/L (ref 1.06–1.42)
CALCIUM SERPL-MCNC: 7.4 MG/DL (ref 8.7–10.5)
CALCIUM SERPL-MCNC: 7.6 MG/DL (ref 8.7–10.5)
CHLORIDE SERPL-SCNC: 120 MMOL/L (ref 95–110)
CHLORIDE SERPL-SCNC: 121 MMOL/L (ref 95–110)
CO2 SERPL-SCNC: 24 MMOL/L (ref 23–29)
CO2 SERPL-SCNC: 24 MMOL/L (ref 23–29)
CREAT SERPL-MCNC: 0.4 MG/DL (ref 0.5–1.4)
CREAT SERPL-MCNC: 0.4 MG/DL (ref 0.5–1.4)
DELSYS: ABNORMAL
DIFFERENTIAL METHOD: ABNORMAL
EOSINOPHIL # BLD AUTO: 0 K/UL (ref 0–0.5)
EOSINOPHIL # BLD AUTO: 0.1 K/UL (ref 0–0.5)
EOSINOPHIL NFR BLD: 0 % (ref 0–8)
EOSINOPHIL NFR BLD: 0.1 % (ref 0–8)
EOSINOPHIL NFR BLD: 0.2 % (ref 0–8)
EOSINOPHIL NFR BLD: 0.9 % (ref 0–8)
EOSINOPHIL NFR BLD: 0.9 % (ref 0–8)
EOSINOPHIL NFR BLD: 1.8 % (ref 0–8)
ERYTHROCYTE [DISTWIDTH] IN BLOOD BY AUTOMATED COUNT: 16.4 % (ref 11.5–14.5)
ERYTHROCYTE [DISTWIDTH] IN BLOOD BY AUTOMATED COUNT: 16.5 % (ref 11.5–14.5)
ERYTHROCYTE [DISTWIDTH] IN BLOOD BY AUTOMATED COUNT: 17.1 % (ref 11.5–14.5)
ERYTHROCYTE [DISTWIDTH] IN BLOOD BY AUTOMATED COUNT: 17.5 % (ref 11.5–14.5)
ERYTHROCYTE [DISTWIDTH] IN BLOOD BY AUTOMATED COUNT: 17.5 % (ref 11.5–14.5)
ERYTHROCYTE [DISTWIDTH] IN BLOOD BY AUTOMATED COUNT: 18.6 % (ref 11.5–14.5)
ERYTHROCYTE [SEDIMENTATION RATE] IN BLOOD BY WESTERGREN METHOD: 18 MM/H
EST. GFR  (NO RACE VARIABLE): >60 ML/MIN/1.73 M^2
EST. GFR  (NO RACE VARIABLE): >60 ML/MIN/1.73 M^2
FIO2: 40
GLUCOSE SERPL-MCNC: 134 MG/DL (ref 70–110)
GLUCOSE SERPL-MCNC: 153 MG/DL (ref 70–110)
HCO3 UR-SCNC: 26.6 MMOL/L (ref 24–28)
HCT VFR BLD AUTO: 23.3 % (ref 37–48.5)
HCT VFR BLD AUTO: 23.6 % (ref 37–48.5)
HCT VFR BLD AUTO: 23.6 % (ref 37–48.5)
HCT VFR BLD AUTO: 24 % (ref 37–48.5)
HCT VFR BLD AUTO: 24.3 % (ref 37–48.5)
HCT VFR BLD AUTO: 25.2 % (ref 37–48.5)
HGB BLD-MCNC: 7.9 G/DL (ref 12–16)
HGB BLD-MCNC: 7.9 G/DL (ref 12–16)
HGB BLD-MCNC: 8 G/DL (ref 12–16)
HGB BLD-MCNC: 8.2 G/DL (ref 12–16)
HGB BLD-MCNC: 8.6 G/DL (ref 12–16)
HGB BLD-MCNC: 8.6 G/DL (ref 12–16)
IMM GRANULOCYTES # BLD AUTO: 0.04 K/UL (ref 0–0.04)
IMM GRANULOCYTES # BLD AUTO: 0.04 K/UL (ref 0–0.04)
IMM GRANULOCYTES # BLD AUTO: 0.05 K/UL (ref 0–0.04)
IMM GRANULOCYTES # BLD AUTO: 0.05 K/UL (ref 0–0.04)
IMM GRANULOCYTES # BLD AUTO: 0.07 K/UL (ref 0–0.04)
IMM GRANULOCYTES # BLD AUTO: 0.07 K/UL (ref 0–0.04)
IMM GRANULOCYTES NFR BLD AUTO: 0.6 % (ref 0–0.5)
IMM GRANULOCYTES NFR BLD AUTO: 0.9 % (ref 0–0.5)
IMM GRANULOCYTES NFR BLD AUTO: 0.9 % (ref 0–0.5)
INR PPP: 1.5 (ref 0.8–1.2)
LYMPHOCYTES # BLD AUTO: 0.9 K/UL (ref 1–4.8)
LYMPHOCYTES # BLD AUTO: 1.1 K/UL (ref 1–4.8)
LYMPHOCYTES # BLD AUTO: 1.2 K/UL (ref 1–4.8)
LYMPHOCYTES # BLD AUTO: 1.3 K/UL (ref 1–4.8)
LYMPHOCYTES NFR BLD: 12.9 % (ref 18–48)
LYMPHOCYTES NFR BLD: 14.3 % (ref 18–48)
LYMPHOCYTES NFR BLD: 16.1 % (ref 18–48)
LYMPHOCYTES NFR BLD: 17.5 % (ref 18–48)
LYMPHOCYTES NFR BLD: 17.9 % (ref 18–48)
LYMPHOCYTES NFR BLD: 17.9 % (ref 18–48)
MAGNESIUM SERPL-MCNC: 2 MG/DL (ref 1.6–2.6)
MAGNESIUM SERPL-MCNC: 2 MG/DL (ref 1.6–2.6)
MCH RBC QN AUTO: 29.6 PG (ref 27–31)
MCH RBC QN AUTO: 29.6 PG (ref 27–31)
MCH RBC QN AUTO: 30 PG (ref 27–31)
MCH RBC QN AUTO: 30.4 PG (ref 27–31)
MCH RBC QN AUTO: 30.5 PG (ref 27–31)
MCH RBC QN AUTO: 30.6 PG (ref 27–31)
MCHC RBC AUTO-ENTMCNC: 33.5 G/DL (ref 32–36)
MCHC RBC AUTO-ENTMCNC: 33.5 G/DL (ref 32–36)
MCHC RBC AUTO-ENTMCNC: 34.1 G/DL (ref 32–36)
MCHC RBC AUTO-ENTMCNC: 34.2 G/DL (ref 32–36)
MCHC RBC AUTO-ENTMCNC: 34.3 G/DL (ref 32–36)
MCHC RBC AUTO-ENTMCNC: 35.4 G/DL (ref 32–36)
MCV RBC AUTO: 86 FL (ref 82–98)
MCV RBC AUTO: 88 FL (ref 82–98)
MCV RBC AUTO: 89 FL (ref 82–98)
MCV RBC AUTO: 90 FL (ref 82–98)
MIN VOL: 5.5
MODE: ABNORMAL
MONOCYTES # BLD AUTO: 0.4 K/UL (ref 0.3–1)
MONOCYTES # BLD AUTO: 0.6 K/UL (ref 0.3–1)
MONOCYTES # BLD AUTO: 0.7 K/UL (ref 0.3–1)
MONOCYTES # BLD AUTO: 0.8 K/UL (ref 0.3–1)
MONOCYTES NFR BLD: 10.4 % (ref 4–15)
MONOCYTES NFR BLD: 10.4 % (ref 4–15)
MONOCYTES NFR BLD: 5.7 % (ref 4–15)
MONOCYTES NFR BLD: 7.9 % (ref 4–15)
MONOCYTES NFR BLD: 9.8 % (ref 4–15)
MONOCYTES NFR BLD: 9.9 % (ref 4–15)
NEUTROPHILS # BLD AUTO: 4.7 K/UL (ref 1.8–7.7)
NEUTROPHILS # BLD AUTO: 5.2 K/UL (ref 1.8–7.7)
NEUTROPHILS # BLD AUTO: 5.2 K/UL (ref 1.8–7.7)
NEUTROPHILS # BLD AUTO: 5.7 K/UL (ref 1.8–7.7)
NEUTROPHILS # BLD AUTO: 5.9 K/UL (ref 1.8–7.7)
NEUTROPHILS # BLD AUTO: 6.2 K/UL (ref 1.8–7.7)
NEUTROPHILS NFR BLD: 69.6 % (ref 38–73)
NEUTROPHILS NFR BLD: 69.6 % (ref 38–73)
NEUTROPHILS NFR BLD: 69.9 % (ref 38–73)
NEUTROPHILS NFR BLD: 73.2 % (ref 38–73)
NEUTROPHILS NFR BLD: 76.8 % (ref 38–73)
NEUTROPHILS NFR BLD: 80.7 % (ref 38–73)
NRBC BLD-RTO: 0 /100 WBC
NRBC BLD-RTO: 1 /100 WBC
NRBC BLD-RTO: 1 /100 WBC
PCO2 BLDA: 40.3 MMHG (ref 35–45)
PEEP: 5
PH SMN: 7.43 [PH] (ref 7.35–7.45)
PHOSPHATE SERPL-MCNC: 2.6 MG/DL (ref 2.7–4.5)
PHOSPHATE SERPL-MCNC: 2.8 MG/DL (ref 2.7–4.5)
PIP: 20
PLATELET # BLD AUTO: 64 K/UL (ref 150–450)
PLATELET # BLD AUTO: 69 K/UL (ref 150–450)
PLATELET # BLD AUTO: 69 K/UL (ref 150–450)
PLATELET # BLD AUTO: 85 K/UL (ref 150–450)
PLATELET # BLD AUTO: 86 K/UL (ref 150–450)
PLATELET # BLD AUTO: 96 K/UL (ref 150–450)
PMV BLD AUTO: 10.1 FL (ref 9.2–12.9)
PMV BLD AUTO: 10.2 FL (ref 9.2–12.9)
PMV BLD AUTO: 10.4 FL (ref 9.2–12.9)
PMV BLD AUTO: 10.6 FL (ref 9.2–12.9)
PMV BLD AUTO: 10.6 FL (ref 9.2–12.9)
PMV BLD AUTO: 10.9 FL (ref 9.2–12.9)
PO2 BLDA: 92 MMHG (ref 80–100)
POC BE: 2 MMOL/L
POC SATURATED O2: 97 % (ref 95–100)
POC TCO2: 28 MMOL/L (ref 23–27)
POCT GLUCOSE: 116 MG/DL (ref 70–110)
POCT GLUCOSE: 120 MG/DL (ref 70–110)
POCT GLUCOSE: 163 MG/DL (ref 70–110)
POCT GLUCOSE: 171 MG/DL (ref 70–110)
POTASSIUM SERPL-SCNC: 3.9 MMOL/L (ref 3.5–5.1)
POTASSIUM SERPL-SCNC: 4.7 MMOL/L (ref 3.5–5.1)
PROT SERPL-MCNC: 3.7 G/DL (ref 6–8.4)
PROT SERPL-MCNC: 3.8 G/DL (ref 6–8.4)
PROTHROMBIN TIME: 15.6 SEC (ref 9–12.5)
PROTHROMBIN TIME: 16.1 SEC (ref 9–12.5)
PROTHROMBIN TIME: 16.1 SEC (ref 9–12.5)
RBC # BLD AUTO: 2.63 M/UL (ref 4–5.4)
RBC # BLD AUTO: 2.67 M/UL (ref 4–5.4)
RBC # BLD AUTO: 2.67 M/UL (ref 4–5.4)
RBC # BLD AUTO: 2.73 M/UL (ref 4–5.4)
RBC # BLD AUTO: 2.81 M/UL (ref 4–5.4)
RBC # BLD AUTO: 2.82 M/UL (ref 4–5.4)
SAMPLE: ABNORMAL
SITE: ABNORMAL
SODIUM SERPL-SCNC: 147 MMOL/L (ref 136–145)
SODIUM SERPL-SCNC: 148 MMOL/L (ref 136–145)
SP02: 98
SPECIMEN OUTDATE: NORMAL
VT: 300
WBC # BLD AUTO: 6.67 K/UL (ref 3.9–12.7)
WBC # BLD AUTO: 7.03 K/UL (ref 3.9–12.7)
WBC # BLD AUTO: 7.48 K/UL (ref 3.9–12.7)
WBC # BLD AUTO: 7.48 K/UL (ref 3.9–12.7)
WBC # BLD AUTO: 8 K/UL (ref 3.9–12.7)
WBC # BLD AUTO: 8.09 K/UL (ref 3.9–12.7)

## 2023-07-08 PROCEDURE — 82803 BLOOD GASES ANY COMBINATION: CPT

## 2023-07-08 PROCEDURE — 85730 THROMBOPLASTIN TIME PARTIAL: CPT | Mod: 91 | Performed by: NURSE PRACTITIONER

## 2023-07-08 PROCEDURE — 86920 COMPATIBILITY TEST SPIN: CPT

## 2023-07-08 PROCEDURE — 63600175 PHARM REV CODE 636 W HCPCS: Performed by: STUDENT IN AN ORGANIZED HEALTH CARE EDUCATION/TRAINING PROGRAM

## 2023-07-08 PROCEDURE — 99900026 HC AIRWAY MAINTENANCE (STAT)

## 2023-07-08 PROCEDURE — 99291 PR CRITICAL CARE, E/M 30-74 MINUTES: ICD-10-PCS | Mod: GC,,, | Performed by: INTERNAL MEDICINE

## 2023-07-08 PROCEDURE — 25000003 PHARM REV CODE 250: Performed by: INTERNAL MEDICINE

## 2023-07-08 PROCEDURE — 99291 CRITICAL CARE FIRST HOUR: CPT | Mod: GC,,, | Performed by: INTERNAL MEDICINE

## 2023-07-08 PROCEDURE — 99900035 HC TECH TIME PER 15 MIN (STAT)

## 2023-07-08 PROCEDURE — 80053 COMPREHEN METABOLIC PANEL: CPT | Mod: 91

## 2023-07-08 PROCEDURE — 86900 BLOOD TYPING SEROLOGIC ABO: CPT | Performed by: INTERNAL MEDICINE

## 2023-07-08 PROCEDURE — 20000000 HC ICU ROOM

## 2023-07-08 PROCEDURE — 85025 COMPLETE CBC W/AUTO DIFF WBC: CPT | Mod: 91

## 2023-07-08 PROCEDURE — 83735 ASSAY OF MAGNESIUM: CPT | Mod: 91

## 2023-07-08 PROCEDURE — 99233 SBSQ HOSP IP/OBS HIGH 50: CPT | Mod: GC,,, | Performed by: INTERNAL MEDICINE

## 2023-07-08 PROCEDURE — 25000003 PHARM REV CODE 250

## 2023-07-08 PROCEDURE — 85025 COMPLETE CBC W/AUTO DIFF WBC: CPT

## 2023-07-08 PROCEDURE — 94640 AIRWAY INHALATION TREATMENT: CPT

## 2023-07-08 PROCEDURE — 27000221 HC OXYGEN, UP TO 24 HOURS

## 2023-07-08 PROCEDURE — 94761 N-INVAS EAR/PLS OXIMETRY MLT: CPT

## 2023-07-08 PROCEDURE — 94003 VENT MGMT INPAT SUBQ DAY: CPT

## 2023-07-08 PROCEDURE — 99233 PR SUBSEQUENT HOSPITAL CARE,LEVL III: ICD-10-PCS | Mod: GC,,, | Performed by: INTERNAL MEDICINE

## 2023-07-08 PROCEDURE — 85610 PROTHROMBIN TIME: CPT | Performed by: STUDENT IN AN ORGANIZED HEALTH CARE EDUCATION/TRAINING PROGRAM

## 2023-07-08 PROCEDURE — 82330 ASSAY OF CALCIUM: CPT | Mod: 91

## 2023-07-08 PROCEDURE — 85730 THROMBOPLASTIN TIME PARTIAL: CPT

## 2023-07-08 PROCEDURE — 84100 ASSAY OF PHOSPHORUS: CPT | Mod: 91

## 2023-07-08 PROCEDURE — C9113 INJ PANTOPRAZOLE SODIUM, VIA: HCPCS | Performed by: STUDENT IN AN ORGANIZED HEALTH CARE EDUCATION/TRAINING PROGRAM

## 2023-07-08 PROCEDURE — 37799 UNLISTED PX VASCULAR SURGERY: CPT

## 2023-07-08 PROCEDURE — 63600175 PHARM REV CODE 636 W HCPCS

## 2023-07-08 RX ORDER — FENTANYL CITRATE 50 UG/ML
50 INJECTION, SOLUTION INTRAMUSCULAR; INTRAVENOUS
Status: DISCONTINUED | OUTPATIENT
Start: 2023-07-08 | End: 2023-07-12

## 2023-07-08 RX ORDER — FENTANYL CITRATE 50 UG/ML
50 INJECTION, SOLUTION INTRAMUSCULAR; INTRAVENOUS
Status: ACTIVE | OUTPATIENT
Start: 2023-07-08 | End: 2023-07-08

## 2023-07-08 RX ADMIN — MUPIROCIN: 20 OINTMENT TOPICAL at 09:07

## 2023-07-08 RX ADMIN — PANTOPRAZOLE SODIUM 40 MG: 40 INJECTION, POWDER, FOR SOLUTION INTRAVENOUS at 08:07

## 2023-07-08 RX ADMIN — INSULIN ASPART 2 UNITS: 100 INJECTION, SOLUTION INTRAVENOUS; SUBCUTANEOUS at 06:07

## 2023-07-08 RX ADMIN — LEVETIRACETAM 1000 MG: 100 INJECTION, SOLUTION INTRAVENOUS at 08:07

## 2023-07-08 RX ADMIN — CEFTRIAXONE 1 G: 1 INJECTION, POWDER, FOR SOLUTION INTRAMUSCULAR; INTRAVENOUS at 08:07

## 2023-07-08 RX ADMIN — SODIUM PHOSPHATE, MONOBASIC, MONOHYDRATE AND SODIUM PHOSPHATE, DIBASIC, ANHYDROUS 15 MMOL: 142; 276 INJECTION, SOLUTION INTRAVENOUS at 06:07

## 2023-07-08 RX ADMIN — MUPIROCIN: 20 OINTMENT TOPICAL at 08:07

## 2023-07-08 NOTE — PLAN OF CARE
[facility] DAILY GOALS       A: Awake    RASS: Goal - RASS Goal: -2-->light sedation  Actual - RASS (Pepper Agitation-Sedation Scale): deep sedation   Restraint necessity: Clinical Justification: Removing medical devices, Treatment Interference  B: Breathe   SBT: Not attempted   C: Coordinate A & B, analgesics/sedatives   Pain: managed    SAT: Not attempted  D: Delirium   CAM-ICU: Overall CAM-ICU: Positive  E: Early(intubated/ Progressive (non-intubated) Mobility   MOVE Screen: Fail   Activity: Activity Management: Patient unable to perform activities  FAS: Feeding/Nutrition   Diet order: Diet/Nutrition Received: NPO,    T: Thrombus   DVT prophylaxis: VTE Required Core Measure: Per order contraindicated for SCDs/Anticoagulants  H: HOB Elevation   Head of Bed (HOB) Positioning: HOB elevated  U: Ulcer Prophylaxis   GI: yes  G: Glucose control   managed    S: Skin   Bathing/Skin Care: bath, complete, electrode patches/site rotation, dressed/undressed, linen changed, incontinence care  Device Skin Pressure Protection: absorbent pad utilized/changed, adhesive use limited, skin-to-skin areas padded, skin-to-device areas padded, positioning supports utilized, pressure points protected  Pressure Reduction Devices: pressure-redistributing mattress utilized, foam padding utilized  Pressure Reduction Techniques: weight shift assistance provided, pressure points protected  Skin Protection: adhesive use limited, incontinence pads utilized, skin-to-skin areas padded, skin-to-device areas padded, tubing/devices free from skin contact  B: Bowel Function   diarrhea   I: Indwelling Catheters   [place]da[place] necessity:      Urethral Catheter 07/05/23 1332 Double-lumen-Reason for Continuing Urinary Catheterization: Critically ill in ICU and requiring hourly monitoring of intake/output   CVC necessity: Yes  D: De-escalation Antibiotics   No    Family/Goals of care/Code Status   Code Status: DNR    24H Vital Sign Range  Temp:  [94.1 °F (34.5  °C)-99.3 °F (37.4 °C)]   Pulse:  [51-87]   Resp:  [15-38]   BP: (120-145)/(57-75)   SpO2:  [96 %-100 %]   Arterial Line BP: ()/(42-67)      Shift Events   Multiple blood BM's overnight, MD aware. No acute drop in H+H.    VS and assessment per flow sheet, patient progressing towards goals as tolerated, plan of care reviewed with patient, all concerns addressed, will continue to monitor.    Tanja Lantigua

## 2023-07-08 NOTE — SUBJECTIVE & OBJECTIVE
Subjective:     Interval History: Underwent EGD on 7/06 which showed oozing duodenal ulcers but nothing that could be treated endoscopically. Clip placed near site of ulcer for IR embo if needed. On the morning of 7/07, she continued to have black stool with increasing pressor requirements requiring multiple units of pRBCs, platelets and cryo. STAT CTA without active extravasation but she was empirically embolized near the location of the clip. In the evening she was noted to have ongoing rectal bleeding, requiring one additional unit of blood at 8 PM. She has not required any further units of blood since and is on minimal pressor support.    Review of Systems   Unable to perform ROS: Intubated   Objective:     Vital Signs (Most Recent):  Temp: 97.9 °F (36.6 °C) (07/08/23 0900)  Pulse: 67 (07/08/23 1121)  Resp: 14 (07/08/23 1121)  BP: (!) 121/57 (07/08/23 0900)  SpO2: 96 % (07/08/23 1121) Vital Signs (24h Range):  Temp:  [94.4 °F (34.7 °C)-99.3 °F (37.4 °C)] 97.9 °F (36.6 °C)  Pulse:  [56-87] 67  Resp:  [14-22] 14  SpO2:  [96 %-100 %] 96 %  BP: (120-145)/(56-75) 121/57  Arterial Line BP: (100-134)/(50-63) 124/58     Weight: 54 kg (119 lb) (07/07/23 0400)  Body mass index is 21.77 kg/m².      Intake/Output Summary (Last 24 hours) at 7/8/2023 1158  Last data filed at 7/8/2023 1101  Gross per 24 hour   Intake 1954.11 ml   Output 1385 ml   Net 569.11 ml       Lines/Drains/Airways       Central Venous Catheter Line  Duration             Percutaneous Central Line Insertion/Assessment - Triple Lumen  07/07/23 0232 Femoral Vein Right;Femoral Right 1 day              Drain  Duration                  NG/OG Tube 07/06/23 0323 Center mouth 2 days         Urethral Catheter 07/05/23 1332 Double-lumen 2 days              Airway  Duration                  Airway - Non-Surgical 07/05/23 1220 Endotracheal Tube 2 days              Arterial Line  Duration             Arterial Line 07/07/23 0232 Left Radial 1 day               Peripheral Intravenous Line  Duration                  Peripheral IV - Single Lumen 07/05/23 1600 20 G Anterior;Proximal;Right Forearm 2 days         Peripheral IV - Single Lumen 07/06/23 0546 18 G Anterior;Right Upper Arm 2 days         Peripheral IV - Single Lumen 07/06/23 2202 18 G Anterior;Left Forearm 1 day                     Physical Exam  Vitals reviewed.   Constitutional:       Appearance: She is ill-appearing.   Cardiovascular:      Rate and Rhythm: Normal rate.   Abdominal:      General: There is no distension.   Neurological:      Comments: Intubated        Significant Labs:  CBC:   Recent Labs   Lab 07/07/23  2346 07/08/23 0437 07/08/23 0827   WBC 7.03 8.00 8.09   HGB 8.6* 8.6* 8.2*   HCT 24.3* 25.2* 24.0*   PLT 96* 85* 86*     BMP:   Recent Labs   Lab 07/08/23 0437   *   *   K 4.7   *   CO2 24   BUN 16   CREATININE 0.4*   CALCIUM 7.4*   MG 2.0     CMP:   Recent Labs   Lab 07/08/23 0437   *   CALCIUM 7.4*   ALBUMIN 1.9*   PROT 3.7*   *   K 4.7   CO2 24   *   BUN 16   CREATININE 0.4*   ALKPHOS 49*   ALT 15   AST 28   BILITOT 3.2*     Coagulation:   Recent Labs   Lab 07/08/23 0827   INR 1.5*   APTT 29.6     All pertinent lab results from the last 24 hours have been reviewed.      Significant Imaging:  Imaging results within the past 24 hours have been reviewed.

## 2023-07-08 NOTE — PLAN OF CARE
[facility] - Cardiac Medical ICU  Discharge Reassessment    Primary Care Provider: Viktor Ross MD    Expected Discharge Date: 7/14/2023    Patient transferred from SICU to MICU 7/7/2023.  Patient not medically ready to discharge per MD. Patient intubated on ventilator.     dextrose 5 % and 0.45 % NaCl      NORepinephrine bitartrate-D5W 0.1 mcg/kg/min (07/07/23 2200)    propofoL 50 mcg/kg/min (07/07/23 2200)         Reassessment (most recent)       Discharge Reassessment - 07/07/23 2246          Discharge Reassessment    Assessment Type Discharge Planning Reassessment     Communicated BRAYAN with patient/caregiver Date not available/Unable to determine     Discharge Plan A Skilled Nursing Facility     Discharge Plan B Home with family;Home Health     DME Needed Upon Discharge  other (see comments)   TBD    Transition of Care Barriers None     Why the patient remains in the hospital Requires continued medical care        Post-Acute Status    Post-Acute Authorization Placement     Post-Acute Placement Status Pending medical clearance/testing     Coverage [employer] O     Discharge Delays None known at this time                   Lizz Lopez RN     [phone]

## 2023-07-08 NOTE — PROGRESS NOTES
[facility] - Cardiac Medical ICU  Gastroenterology  Progress Note    Patient Name: Marely Hamilton  MRN: [id number]  Admission Date: 7/5/2023  Hospital Length of Stay: 3 days  Code Status: DNR   Attending Provider: Bubba David MD  Consulting Provider: Savanna Lambert MD  Primary Care Physician: Viktor Ross MD  Principal Problem: Gastrointestinal hemorrhage associated with duodenal ulcer        Subjective:     Interval History: Underwent EGD on 7/06 which showed oozing duodenal ulcers but nothing that could be treated endoscopically. Clip placed near site of ulcer for IR embo if needed. On the morning of 7/07, she continued to have black stool with increasing pressor requirements requiring multiple units of pRBCs, platelets and cryo. STAT CTA without active extravasation but she was empirically embolized near the location of the clip. In the evening she was noted to have ongoing rectal bleeding, requiring one additional unit of blood at 8 PM. She has not required any further units of blood since and is on minimal pressor support.    Review of Systems   Unable to perform ROS: Intubated   Objective:     Vital Signs (Most Recent):  Temp: 97.9 °F (36.6 °C) (07/08/23 0900)  Pulse: 67 (07/08/23 1121)  Resp: 14 (07/08/23 1121)  BP: (!) 121/57 (07/08/23 0900)  SpO2: 96 % (07/08/23 1121) Vital Signs (24h Range):  Temp:  [94.4 °F (34.7 °C)-99.3 °F (37.4 °C)] 97.9 °F (36.6 °C)  Pulse:  [56-87] 67  Resp:  [14-22] 14  SpO2:  [96 %-100 %] 96 %  BP: (120-145)/(56-75) 121/57  Arterial Line BP: (100-134)/(50-63) 124/58     Weight: 54 kg (119 lb) (07/07/23 0400)  Body mass index is 21.77 kg/m².      Intake/Output Summary (Last 24 hours) at 7/8/2023 1158  Last data filed at 7/8/2023 1101  Gross per 24 hour   Intake 1954.11 ml   Output 1385 ml   Net 569.11 ml       Lines/Drains/Airways       Central Venous Catheter Line  Duration             Percutaneous Central Line Insertion/Assessment - Triple Lumen  07/07/23 0232 Femoral Vein  Right;Femoral Right 1 day              Drain  Duration                  NG/OG Tube 07/06/23 0323 Center mouth 2 days         Urethral Catheter 07/05/23 1332 Double-lumen 2 days              Airway  Duration                  Airway - Non-Surgical 07/05/23 1220 Endotracheal Tube 2 days              Arterial Line  Duration             Arterial Line 07/07/23 0232 Left Radial 1 day              Peripheral Intravenous Line  Duration                  Peripheral IV - Single Lumen 07/05/23 1600 20 G Anterior;Proximal;Right Forearm 2 days         Peripheral IV - Single Lumen 07/06/23 0546 18 G Anterior;Right Upper Arm 2 days         Peripheral IV - Single Lumen 07/06/23 2202 18 G Anterior;Left Forearm 1 day                     Physical Exam  Vitals reviewed.   Constitutional:       Appearance: She is ill-appearing.   Cardiovascular:      Rate and Rhythm: Normal rate.   Abdominal:      General: There is no distension.   Neurological:      Comments: Intubated        Significant Labs:  CBC:   Recent Labs   Lab 07/07/23  2346 07/08/23  0437 07/08/23  0827   WBC 7.03 8.00 8.09   HGB 8.6* 8.6* 8.2*   HCT 24.3* 25.2* 24.0*   PLT 96* 85* 86*     BMP:   Recent Labs   Lab 07/08/23  0437   *   *   K 4.7   *   CO2 24   BUN 16   CREATININE 0.4*   CALCIUM 7.4*   MG 2.0     CMP:   Recent Labs   Lab 07/08/23  0437   *   CALCIUM 7.4*   ALBUMIN 1.9*   PROT 3.7*   *   K 4.7   CO2 24   *   BUN 16   CREATININE 0.4*   ALKPHOS 49*   ALT 15   AST 28   BILITOT 3.2*     Coagulation:   Recent Labs   Lab 07/08/23  0827   INR 1.5*   APTT 29.6     All pertinent lab results from the last 24 hours have been reviewed.      Significant Imaging:  Imaging results within the past 24 hours have been reviewed.    Assessment/Plan:     Oncology  Acute blood loss anemia  58 F with alcoholic cirrhosis admitted with hemorrhagic shock. EGD 7/06 with oozing duodenal ulcers but nothing treatable, clip placed. Developed worsening  hemodynamics and overt bleeding leading to transfusion of multiple blood products. Ultimately empirically embolized with IR on 7/07 near location of clip but no active bleeding seen on CTA. She remains intubated with ongoing dark bowel movements. Primary team weaning sedation to assess neuro function as her gag reflex is no longer intact.     Recommendations:  - Defer repeat endoscopy at this time as she is on minimal pressors and has not required ongoing transfusions.   - Neurologic function needs to be assessed by primary team if repeat endoscopy is required.   - Continue IV PPI 40 mg BID  - Trend CBC as clinically indicated, Transfused for Hgb <7.0  - Correct coagulopathy for INR <2.0 and Platelets >50K          Thank you for your consult. I will follow-up with patient. Please contact us if you have any additional questions.    Savanna Lambert MD  Gastroenterology  [facility] - Cardiac Medical ICU

## 2023-07-08 NOTE — SUBJECTIVE & OBJECTIVE
Interval History/Significant Events: Pt still actively bleeding. Transfused blood. Discussed with GI and IR, no current procedural plans. Dicussed with sister plans moving forward.     Review of Systems   Unable to perform ROS: Patient unresponsive   Objective:     Vital Signs (Most Recent):  Temp: 98 °F (36.7 °C) (07/08/23 0300)  Pulse: 64 (07/08/23 0731)  Resp: 18 (07/08/23 0731)  BP: (!) 145/75 (07/07/23 1200)  SpO2: 99 % (07/08/23 0731) Vital Signs (24h Range):  Temp:  [94.3 °F (34.6 °C)-99.3 °F (37.4 °C)] 98 °F (36.7 °C)  Pulse:  [51-87] 64  Resp:  [15-22] 18  SpO2:  [96 %-100 %] 99 %  BP: (124-145)/(57-75) 145/75  Arterial Line BP: (100-131)/(42-63) 126/59   Weight: 54 kg (119 lb)  Body mass index is 21.77 kg/m².      Intake/Output Summary (Last 24 hours) at 7/8/2023 0909  Last data filed at 7/8/2023 0600  Gross per 24 hour   Intake 2235.68 ml   Output 1345 ml   Net 890.68 ml            Physical Exam  Constitutional:       Appearance: She is ill-appearing and toxic-appearing.      Interventions: She is intubated.   HENT:      Head: Normocephalic.   Eyes:      General: Scleral icterus present.   Neck:      Thyroid: No thyroid mass or thyroid tenderness.      Vascular: No carotid bruit or hepatojugular reflux.   Cardiovascular:      Rate and Rhythm: Normal rate.      Pulses: Decreased pulses.      Heart sounds: Heart sounds are distant.   Pulmonary:      Effort: She is intubated.   Chest:      Chest wall: No mass, lacerations or deformity.   Breasts:     Right: No swelling.      Left: No swelling.   Abdominal:      General: Abdomen is flat.      Palpations: Abdomen is rigid.      Hernia: No hernia is present.   Genitourinary:     Vagina: Normal.      Comments: Copius amount of bright blood exiting rectum   Musculoskeletal:      Cervical back: Rigidity present.   Lymphadenopathy:      Cervical: No cervical adenopathy.   Skin:     Coloration: Skin is jaundiced.      Findings: Ecchymosis present.   Neurological:       Mental Status: She is unresponsive.          Vents:  Vent Mode: A/C (07/08/23 0731)  Ventilator Initiated: Yes (07/05/23 1221)  Set Rate: 18 BPM (07/08/23 0731)  Vt Set: 300 mL (07/08/23 0731)  PEEP/CPAP: 5 cmH20 (07/08/23 0731)  Oxygen Concentration (%): 40 (07/08/23 0731)  Peak Airway Pressure: 21 cmH20 (07/08/23 0731)  Plateau Pressure: 8.7 cmH20 (07/08/23 0731)  Total Ve: 5.48 L/m (07/08/23 0731)  Negative Inspiratory Force (cm H2O): 0 (07/08/23 0731)  F/VT Ratio<105 (RSBI): (!) 59.02 (07/08/23 0731)  Lines/Drains/Airways       Central Venous Catheter Line  Duration             Percutaneous Central Line Insertion/Assessment - Triple Lumen  07/07/23 0232 Femoral Vein Right;Femoral Right 1 day              Drain  Duration                  NG/OG Tube 07/06/23 0323 Center mouth 2 days         Urethral Catheter 07/05/23 1332 Double-lumen 2 days              Airway  Duration                  Airway - Non-Surgical 07/05/23 1220 Endotracheal Tube 2 days              Arterial Line  Duration             Arterial Line 07/07/23 0232 Left Radial 1 day              Peripheral Intravenous Line  Duration                  Peripheral IV - Single Lumen 07/05/23 1600 20 G Anterior;Proximal;Right Forearm 2 days         Peripheral IV - Single Lumen 07/06/23 0546 18 G Anterior;Right Upper Arm 2 days         Peripheral IV - Single Lumen 07/06/23 2202 18 G Anterior;Left Forearm 1 day                  Significant Labs:    CBC/Anemia Profile:  Recent Labs   Lab 07/07/23 2024 07/07/23  2346 07/08/23  0437   WBC 4.57 7.03 8.00   HGB 8.8* 8.6* 8.6*   HCT 25.8* 24.3* 25.2*   PLT 76* 96* 85*   MCV 86 86 90   RDW 16.0* 16.4* 16.5*          Chemistries:  Recent Labs   Lab 07/07/23  0245 07/07/23  1614 07/08/23  0437    147* 147*   K 4.3 4.4 4.7   * 119* 121*   CO2 24 24 24   BUN 18 14 16   CREATININE 0.4* 0.4* 0.4*   CALCIUM 7.3* 7.4* 7.4*   ALBUMIN 1.9* 1.9* 1.9*   PROT 3.4* 3.5* 3.7*   BILITOT 3.3* 4.1* 3.2*   ALKPHOS 40* 46*  49*   ALT 13 13 15   AST 29 28 28   MG 2.2 2.1 2.0   PHOS 3.3 2.7 2.6*         All pertinent labs within the past 24 hours have been reviewed.    Significant Imaging:  I have reviewed all pertinent imaging results/findings within the past 24 hours.

## 2023-07-08 NOTE — PROGRESS NOTES
[facility] - Cardiac Medical ICU  Critical Care Medicine  Progress Note    Patient Name: Marely Haimlton  MRN: [id number]  Admission Date: 7/5/2023  Hospital Length of Stay: 3 days  Code Status: DNR  Attending Provider: Bubba David MD  Primary Care Provider: Viktor Ross MD   Principal Problem: Gastrointestinal hemorrhage associated with duodenal ulcer    Subjective:     HPI:  Marely Hamilton is a 58 y.o. female with history of alcoholic cirrhosis, seizure disorder, DVT and IJ thrombus with recent admission for large retroperitoneal hemorrhage requiring IR embolization and IVC filter placement who presents for hematochezia. Onset this morning at [facility], alida blood per rectum per chart, sent to ED. Initially normotensive but then severe hypotension prompted initiation of levophed and intubation. Hgb 6.8, 2u pRBC given + 1u FFP ordered. Hgb 8.5 on 7/2. On levo and vaso currently. K 6.6 but renal function at baseline. Repeat pending. No prior EGDs on record.      Hospital/ICU Course:  Patient was seen at bedside, hematochezia still present post op by IR. HgB trended downward from 10 to 7.3. Platelets and pRBCs were given to the pt. Pt pressor requirement increased. Discussed with GI and IR regarding active bleeding post op, IR indicated there is not much else to do from their end bc they embolized a significant part of GDA. GI indicated there is no active plans to scope her, unless pressor requirement increases and Hgb trends down . Discuss with sister plans moving forward.           Interval History/Significant Events: Pt still actively bleeding. Transfused blood. Discussed with GI and IR, no current procedural plans. Dicussed with sister plans moving forward.     Review of Systems   Unable to perform ROS: Patient unresponsive   Objective:     Vital Signs (Most Recent):  Temp: 98 °F (36.7 °C) (07/08/23 0300)  Pulse: 64 (07/08/23 0731)  Resp: 18 (07/08/23 0731)  BP: (!) 145/75 (07/07/23 1200)  SpO2: 99 % (07/08/23 0731)  Vital Signs (24h Range):  Temp:  [94.3 °F (34.6 °C)-99.3 °F (37.4 °C)] 98 °F (36.7 °C)  Pulse:  [51-87] 64  Resp:  [15-22] 18  SpO2:  [96 %-100 %] 99 %  BP: (124-145)/(57-75) 145/75  Arterial Line BP: (100-131)/(42-63) 126/59   Weight: 54 kg (119 lb)  Body mass index is 21.77 kg/m².      Intake/Output Summary (Last 24 hours) at 7/8/2023 0909  Last data filed at 7/8/2023 0600  Gross per 24 hour   Intake 2235.68 ml   Output 1345 ml   Net 890.68 ml            Physical Exam  Constitutional:       Appearance: She is ill-appearing and toxic-appearing.      Interventions: She is intubated.   HENT:      Head: Normocephalic.   Eyes:      General: Scleral icterus present.   Neck:      Thyroid: No thyroid mass or thyroid tenderness.      Vascular: No carotid bruit or hepatojugular reflux.   Cardiovascular:      Rate and Rhythm: Normal rate.      Pulses: Decreased pulses.      Heart sounds: Heart sounds are distant.   Pulmonary:      Effort: She is intubated.   Chest:      Chest wall: No mass, lacerations or deformity.   Breasts:     Right: No swelling.      Left: No swelling.   Abdominal:      General: Abdomen is flat.      Palpations: Abdomen is rigid.      Hernia: No hernia is present.   Genitourinary:     Vagina: Normal.      Comments: Copius amount of bright blood exiting rectum   Musculoskeletal:      Cervical back: Rigidity present.   Lymphadenopathy:      Cervical: No cervical adenopathy.   Skin:     Coloration: Skin is jaundiced.      Findings: Ecchymosis present.   Neurological:      Mental Status: She is unresponsive.          Vents:  Vent Mode: A/C (07/08/23 0731)  Ventilator Initiated: Yes (07/05/23 1221)  Set Rate: 18 BPM (07/08/23 0731)  Vt Set: 300 mL (07/08/23 0731)  PEEP/CPAP: 5 cmH20 (07/08/23 0731)  Oxygen Concentration (%): 40 (07/08/23 0731)  Peak Airway Pressure: 21 cmH20 (07/08/23 0731)  Plateau Pressure: 8.7 cmH20 (07/08/23 0731)  Total Ve: 5.48 L/m (07/08/23 0731)  Negative Inspiratory Force (cm H2O):  0 (07/08/23 0731)  F/VT Ratio<105 (RSBI): (!) 59.02 (07/08/23 0731)  Lines/Drains/Airways       Central Venous Catheter Line  Duration             Percutaneous Central Line Insertion/Assessment - Triple Lumen  07/07/23 0232 Femoral Vein Right;Femoral Right 1 day              Drain  Duration                  NG/OG Tube 07/06/23 0323 Center mouth 2 days         Urethral Catheter 07/05/23 1332 Double-lumen 2 days              Airway  Duration                  Airway - Non-Surgical 07/05/23 1220 Endotracheal Tube 2 days              Arterial Line  Duration             Arterial Line 07/07/23 0232 Left Radial 1 day              Peripheral Intravenous Line  Duration                  Peripheral IV - Single Lumen 07/05/23 1600 20 G Anterior;Proximal;Right Forearm 2 days         Peripheral IV - Single Lumen 07/06/23 0546 18 G Anterior;Right Upper Arm 2 days         Peripheral IV - Single Lumen 07/06/23 2202 18 G Anterior;Left Forearm 1 day                  Significant Labs:    CBC/Anemia Profile:  Recent Labs   Lab 07/07/23  2024 07/07/23  2346 07/08/23  0437   WBC 4.57 7.03 8.00   HGB 8.8* 8.6* 8.6*   HCT 25.8* 24.3* 25.2*   PLT 76* 96* 85*   MCV 86 86 90   RDW 16.0* 16.4* 16.5*          Chemistries:  Recent Labs   Lab 07/07/23  0245 07/07/23  1614 07/08/23  0437    147* 147*   K 4.3 4.4 4.7   * 119* 121*   CO2 24 24 24   BUN 18 14 16   CREATININE 0.4* 0.4* 0.4*   CALCIUM 7.3* 7.4* 7.4*   ALBUMIN 1.9* 1.9* 1.9*   PROT 3.4* 3.5* 3.7*   BILITOT 3.3* 4.1* 3.2*   ALKPHOS 40* 46* 49*   ALT 13 13 15   AST 29 28 28   MG 2.2 2.1 2.0   PHOS 3.3 2.7 2.6*         All pertinent labs within the past 24 hours have been reviewed.    Significant Imaging:  I have reviewed all pertinent imaging results/findings within the past 24 hours.      ABG  Recent Labs   Lab 07/08/23  0255   PH 7.429   PO2 92   PCO2 40.3   HCO3 26.6   BE 2     Assessment/Plan:     Oncology  Acute blood loss anemia  - Maintain IV access with 2 large bore  Ivs  - Intravascular resuscitation/support with IVFs   - Hold all NSAIDs and anticoagulants, unless contraindicated.  - Please correct any coagulopathy with platelets and FFP for goal of platelets >50K and INR <2.0  - Please notify GI team if there is significant change in patient's clinical status         Endocrine  Severe protein-calorie malnutrition  Nutrition consulted. Most recent weight and BMI monitored-     Measurements:  Wt Readings from Last 1 Encounters:   07/07/23 54 kg (119 lb)   Body mass index is 21.77 kg/m².    Patient has been screened and assessed by RD.    Malnutrition Type:  Context: social/environmental circumstances  Level: severe    Malnutrition Characteristic Summary:  Energy Intake (Malnutrition): less than or equal to 75% for greater than or equal to 1 month  Subcutaneous Fat (Malnutrition): severe depletion  Muscle Mass (Malnutrition): severe depletion    Interventions/Recommendations (treatment strategy):  1. When medically able, initiate TF regimen of Impact Peptide 1.5 @ goal rate of 45 mL/hr- provides 1620 kcals, 101 g pro, and 832 mL fluid. 2. If extubated and able to tolerate PO intake before next RD f/u, ADAT to regular- texture per SLP. 3. RD following.    GI  * Gastrointestinal hemorrhage associated with duodenal ulcer  See above     Hematochezia  -GI EGD clipped duodenal ulcers for IR reference  - Transfuse blood products for active bleeding   - trend CBC and follow  - IR embolized GDA for duodenal hyperemia     Other  Retroperitoneal bleed  -- CTA f/u  --CTA demonstrated stable retroperitoneal hematoma       Critical Care Daily Checklist:    A: Awake: RASS Goal/Actual Goal: RASS Goal: -2-->light sedation  Actual:     B: Spontaneous Breathing Trial Performed?     C: SAT & SBT Coordinated?  n/a                      D: Delirium: CAM-ICU Overall CAM-ICU: Positive   E: Early Mobility Performed? Yes   F: Feeding Goal: Goals: Will meet % EEN/EPN by next RD f/u.  Status: Nutrition  Goal Status: new   Current Diet Order   Procedures    Diet NPO      AS: Analgesia/Sedation Propofol   T: Thromboembolic Prophylaxis n/a   H: HOB > 300 Yes   U: Stress Ulcer Prophylaxis (if needed) ppi   G: Glucose Control ssi   B: Bowel Function Stool Occurrence: 1   I: Indwelling Catheter (Lines & العراقي) Necessity العراقي   D: De-escalation of Antimicrobials/Pharmacotherapies N/A    Plan for the day/ETD Trend Hgb, check for bleeding    Code Status:  Family/Goals of Care: DNR  n/a       Critical secondary to Patient has a condition that poses threat to life and bodily function: Hemorraghic shock        Critical care was time spent personally by me on the following activities: development of treatment plan with patient or surrogate and bedside caregivers, discussions with consultants, evaluation of patient's response to treatment, examination of patient, ordering and performing treatments and interventions, ordering and review of laboratory studies, ordering and review of radiographic studies, pulse oximetry, re-evaluation of patient's condition. This critical care time did not overlap with that of any other provider or involve time for any procedures.     Phong Jean DO  Critical Care Medicine  [facility] - Cardiac Medical ICU

## 2023-07-08 NOTE — ASSESSMENT & PLAN NOTE
58 F with alcoholic cirrhosis admitted with hemorrhagic shock. EGD 7/06 with oozing duodenal ulcers but nothing treatable, clip placed. Developed worsening hemodynamics and overt bleeding leading to transfusion of multiple blood products. Ultimately empirically embolized with IR on 7/07 near location of clip but no active bleeding seen on CTA. She remains intubated with ongoing dark bowel movements. Primary team weaning sedation to assess neuro function as her gag reflex is no longer intact.     Recommendations:  - Defer repeat endoscopy at this time as she is on minimal pressors and has not required ongoing transfusions.   - Neurologic function needs to be assessed by primary team if repeat endoscopy is required.   - Continue IV PPI 40 mg BID  - Trend CBC as clinically indicated, Transfused for Hgb <7.0  - Correct coagulopathy for INR <2.0 and Platelets >50K

## 2023-07-08 NOTE — PLAN OF CARE
Problem: Infection  Goal: Absence of Infection Signs and Symptoms  Outcome: Ongoing, Progressing     Problem: Adult Inpatient Plan of Care  Goal: Plan of Care Review  Outcome: Ongoing, Progressing  Goal: Patient-Specific Goal (Individualized)  Outcome: Ongoing, Progressing  Goal: Absence of Hospital-Acquired Illness or Injury  Outcome: Ongoing, Progressing  Goal: Optimal Comfort and Wellbeing  Outcome: Ongoing, Progressing  Goal: Readiness for Transition of Care  Outcome: Ongoing, Progressing     CMICU DAILY GOALS       A: Awake    RASS: Goal - RASS Goal: 0-->alert and calm  Actual - RASS (Pepper Agitation-Sedation Scale): drowsy   Restraint necessity: Clinical Justification: Climbing out of bed, Removing medical devices, Treatment Interference  B: Breathe   SBT: Fail   C: Coordinate A & B, analgesics/sedatives   Pain: managed    SAT: Fail  D: Delirium   CAM-ICU: Overall CAM-ICU: Positive  E: Early(intubated/ Progressive (non-intubated) Mobility   MOVE Screen: Fail   Activity: Activity Management: Rolling - L1  FAS: Feeding/Nutrition   Diet order: Diet/Nutrition Received: NPO,    T: Thrombus   DVT prophylaxis: VTE Required Core Measure: Per order contraindicated for SCDs/Anticoagulants  H: HOB Elevation   Head of Bed (HOB) Positioning: HOB at 20-30 degrees  U: Ulcer Prophylaxis   GI: yes  G: Glucose control   managed    S: Skin   Bathing/Skin Care: bath, complete, dressed/undressed, incontinence care, linen changed  Device Skin Pressure Protection: absorbent pad utilized/changed  Pressure Reduction Devices: foam padding utilized  Pressure Reduction Techniques: weight shift assistance provided  Skin Protection: adhesive use limited, incontinence pads utilized, tubing/devices free from skin contact  B: Bowel Function   Bloody BMs    I: Indwelling Catheters   [place] necessity:      Urethral Catheter 07/05/23 1332 Double-lumen-Reason for Continuing Urinary Catheterization: Critically ill in ICU and requiring hourly  monitoring of intake/output   CVC necessity: Yes  D: De-escalation Antibiotics   Yes    Family/Goals of care/Code Status   Code Status: DNR    24H Vital Sign Range  Temp:  [97 °F (36.1 °C)-99.3 °F (37.4 °C)]   Pulse:  [64-87]   Resp:  [12-22]   BP: (112-121)/(52-57)   SpO2:  [96 %-100 %]   Arterial Line BP: (100-134)/(50-62)      Shift Events   No acute events throughout shift    VS and assessment per flow sheet, Levo and Prop turned off this morning, patient progressing towards goals as tolerated, plan of care reviewed with  patient , all concerns addressed, will continue to monitor.    Jackelyn Cabezas

## 2023-07-09 LAB
ALBUMIN SERPL BCP-MCNC: 2.1 G/DL (ref 3.5–5.2)
ALBUMIN SERPL BCP-MCNC: 2.9 G/DL (ref 3.5–5.2)
ALLENS TEST: ABNORMAL
ALP SERPL-CCNC: 48 U/L (ref 55–135)
ALP SERPL-CCNC: 62 U/L (ref 55–135)
ALT SERPL W/O P-5'-P-CCNC: 10 U/L (ref 10–44)
ALT SERPL W/O P-5'-P-CCNC: 14 U/L (ref 10–44)
AMMONIA PLAS-SCNC: 81 UMOL/L (ref 10–50)
ANION GAP SERPL CALC-SCNC: 5 MMOL/L (ref 8–16)
ANION GAP SERPL CALC-SCNC: 7 MMOL/L (ref 8–16)
APTT PPP: 25.7 SEC (ref 21–32)
APTT PPP: 26.8 SEC (ref 21–32)
APTT PPP: 27.6 SEC (ref 21–32)
AST SERPL-CCNC: 23 U/L (ref 10–40)
AST SERPL-CCNC: 32 U/L (ref 10–40)
BASOPHILS # BLD AUTO: 0.02 K/UL (ref 0–0.2)
BASOPHILS # BLD AUTO: 0.03 K/UL (ref 0–0.2)
BASOPHILS # BLD AUTO: 0.04 K/UL (ref 0–0.2)
BASOPHILS NFR BLD: 0.4 % (ref 0–1.9)
BASOPHILS NFR BLD: 0.5 % (ref 0–1.9)
BASOPHILS NFR BLD: 0.5 % (ref 0–1.9)
BILIRUB SERPL-MCNC: 3.5 MG/DL (ref 0.1–1)
BILIRUB SERPL-MCNC: 3.5 MG/DL (ref 0.1–1)
BLD PROD TYP BPU: NORMAL
BLD PROD TYP BPU: NORMAL
BLOOD UNIT EXPIRATION DATE: NORMAL
BLOOD UNIT EXPIRATION DATE: NORMAL
BLOOD UNIT TYPE CODE: 5100
BLOOD UNIT TYPE CODE: 6200
BLOOD UNIT TYPE: NORMAL
BLOOD UNIT TYPE: NORMAL
BUN SERPL-MCNC: 20 MG/DL (ref 6–20)
BUN SERPL-MCNC: 21 MG/DL (ref 6–20)
CA-I BLDV-SCNC: 1.14 MMOL/L (ref 1.06–1.42)
CA-I BLDV-SCNC: 1.17 MMOL/L (ref 1.06–1.42)
CALCIUM SERPL-MCNC: 7.7 MG/DL (ref 8.7–10.5)
CALCIUM SERPL-MCNC: 8 MG/DL (ref 8.7–10.5)
CHLORIDE SERPL-SCNC: 121 MMOL/L (ref 95–110)
CHLORIDE SERPL-SCNC: 122 MMOL/L (ref 95–110)
CO2 SERPL-SCNC: 22 MMOL/L (ref 23–29)
CO2 SERPL-SCNC: 23 MMOL/L (ref 23–29)
CODING SYSTEM: NORMAL
CODING SYSTEM: NORMAL
CREAT SERPL-MCNC: 0.5 MG/DL (ref 0.5–1.4)
CREAT SERPL-MCNC: 0.5 MG/DL (ref 0.5–1.4)
CROSSMATCH INTERPRETATION: NORMAL
CROSSMATCH INTERPRETATION: NORMAL
DELSYS: ABNORMAL
DIFFERENTIAL METHOD: ABNORMAL
DISPENSE STATUS: NORMAL
DISPENSE STATUS: NORMAL
EOSINOPHIL # BLD AUTO: 0.1 K/UL (ref 0–0.5)
EOSINOPHIL # BLD AUTO: 0.1 K/UL (ref 0–0.5)
EOSINOPHIL # BLD AUTO: 0.2 K/UL (ref 0–0.5)
EOSINOPHIL NFR BLD: 1.8 % (ref 0–8)
EOSINOPHIL NFR BLD: 2.3 % (ref 0–8)
EOSINOPHIL NFR BLD: 2.4 % (ref 0–8)
ERYTHROCYTE [DISTWIDTH] IN BLOOD BY AUTOMATED COUNT: 19.2 % (ref 11.5–14.5)
ERYTHROCYTE [DISTWIDTH] IN BLOOD BY AUTOMATED COUNT: 20 % (ref 11.5–14.5)
ERYTHROCYTE [DISTWIDTH] IN BLOOD BY AUTOMATED COUNT: 20.9 % (ref 11.5–14.5)
ERYTHROCYTE [SEDIMENTATION RATE] IN BLOOD BY WESTERGREN METHOD: 8 MM/H
EST. GFR  (NO RACE VARIABLE): >60 ML/MIN/1.73 M^2
EST. GFR  (NO RACE VARIABLE): >60 ML/MIN/1.73 M^2
FIO2: 50
GLUCOSE SERPL-MCNC: 111 MG/DL (ref 70–110)
GLUCOSE SERPL-MCNC: 112 MG/DL (ref 70–110)
HCO3 UR-SCNC: 22.4 MMOL/L (ref 24–28)
HCT VFR BLD AUTO: 19.3 % (ref 37–48.5)
HCT VFR BLD AUTO: 24.8 % (ref 37–48.5)
HCT VFR BLD AUTO: 25.3 % (ref 37–48.5)
HGB BLD-MCNC: 6.3 G/DL (ref 12–16)
HGB BLD-MCNC: 8.1 G/DL (ref 12–16)
HGB BLD-MCNC: 8.4 G/DL (ref 12–16)
IMM GRANULOCYTES # BLD AUTO: 0.03 K/UL (ref 0–0.04)
IMM GRANULOCYTES # BLD AUTO: 0.04 K/UL (ref 0–0.04)
IMM GRANULOCYTES # BLD AUTO: 0.05 K/UL (ref 0–0.04)
IMM GRANULOCYTES NFR BLD AUTO: 0.5 % (ref 0–0.5)
IMM GRANULOCYTES NFR BLD AUTO: 0.7 % (ref 0–0.5)
IMM GRANULOCYTES NFR BLD AUTO: 0.7 % (ref 0–0.5)
INR PPP: 1.5 (ref 0.8–1.2)
INR PPP: 1.6 (ref 0.8–1.2)
INR PPP: 1.7 (ref 0.8–1.2)
LYMPHOCYTES # BLD AUTO: 0.8 K/UL (ref 1–4.8)
LYMPHOCYTES # BLD AUTO: 1.1 K/UL (ref 1–4.8)
LYMPHOCYTES # BLD AUTO: 1.5 K/UL (ref 1–4.8)
LYMPHOCYTES NFR BLD: 14.2 % (ref 18–48)
LYMPHOCYTES NFR BLD: 19.5 % (ref 18–48)
LYMPHOCYTES NFR BLD: 20.3 % (ref 18–48)
MAGNESIUM SERPL-MCNC: 2 MG/DL (ref 1.6–2.6)
MCH RBC QN AUTO: 29.9 PG (ref 27–31)
MCH RBC QN AUTO: 30.5 PG (ref 27–31)
MCH RBC QN AUTO: 31 PG (ref 27–31)
MCHC RBC AUTO-ENTMCNC: 32.6 G/DL (ref 32–36)
MCHC RBC AUTO-ENTMCNC: 32.7 G/DL (ref 32–36)
MCHC RBC AUTO-ENTMCNC: 33.2 G/DL (ref 32–36)
MCV RBC AUTO: 92 FL (ref 82–98)
MCV RBC AUTO: 92 FL (ref 82–98)
MCV RBC AUTO: 95 FL (ref 82–98)
MODE: ABNORMAL
MONOCYTES # BLD AUTO: 0.4 K/UL (ref 0.3–1)
MONOCYTES # BLD AUTO: 0.7 K/UL (ref 0.3–1)
MONOCYTES # BLD AUTO: 0.7 K/UL (ref 0.3–1)
MONOCYTES NFR BLD: 9.3 % (ref 4–15)
MONOCYTES NFR BLD: 9.4 % (ref 4–15)
MONOCYTES NFR BLD: 9.8 % (ref 4–15)
NEUTROPHILS # BLD AUTO: 2.7 K/UL (ref 1.8–7.7)
NEUTROPHILS # BLD AUTO: 5 K/UL (ref 1.8–7.7)
NEUTROPHILS # BLD AUTO: 5.9 K/UL (ref 1.8–7.7)
NEUTROPHILS NFR BLD: 66.3 % (ref 38–73)
NEUTROPHILS NFR BLD: 67.7 % (ref 38–73)
NEUTROPHILS NFR BLD: 73.7 % (ref 38–73)
NRBC BLD-RTO: 0 /100 WBC
PCO2 BLDA: 37.2 MMHG (ref 35–45)
PH SMN: 7.39 [PH] (ref 7.35–7.45)
PHOSPHATE SERPL-MCNC: 2.5 MG/DL (ref 2.7–4.5)
PHOSPHATE SERPL-MCNC: 2.7 MG/DL (ref 2.7–4.5)
PLATELET # BLD AUTO: 43 K/UL (ref 150–450)
PLATELET # BLD AUTO: 74 K/UL (ref 150–450)
PLATELET # BLD AUTO: 75 K/UL (ref 150–450)
PMV BLD AUTO: 10.4 FL (ref 9.2–12.9)
PMV BLD AUTO: 9.4 FL (ref 9.2–12.9)
PMV BLD AUTO: 9.7 FL (ref 9.2–12.9)
PO2 BLDA: 191 MMHG (ref 80–100)
POC BE: -3 MMOL/L
POC SATURATED O2: 100 % (ref 95–100)
POC TCO2: 23 MMOL/L (ref 23–27)
POCT GLUCOSE: 112 MG/DL (ref 70–110)
POCT GLUCOSE: 121 MG/DL (ref 70–110)
POCT GLUCOSE: 122 MG/DL (ref 70–110)
POTASSIUM SERPL-SCNC: 3.5 MMOL/L (ref 3.5–5.1)
POTASSIUM SERPL-SCNC: 3.9 MMOL/L (ref 3.5–5.1)
PROT SERPL-MCNC: 4 G/DL (ref 6–8.4)
PROT SERPL-MCNC: 4.2 G/DL (ref 6–8.4)
PROTHROMBIN TIME: 15.6 SEC (ref 9–12.5)
PROTHROMBIN TIME: 16.4 SEC (ref 9–12.5)
PROTHROMBIN TIME: 17.7 SEC (ref 9–12.5)
RBC # BLD AUTO: 2.03 M/UL (ref 4–5.4)
RBC # BLD AUTO: 2.71 M/UL (ref 4–5.4)
RBC # BLD AUTO: 2.75 M/UL (ref 4–5.4)
SAMPLE: ABNORMAL
SITE: ABNORMAL
SODIUM SERPL-SCNC: 149 MMOL/L (ref 136–145)
SODIUM SERPL-SCNC: 151 MMOL/L (ref 136–145)
SP02: 100
TRANS ERYTHROCYTES VOL PATIENT: NORMAL ML
UNIT NUMBER: NORMAL
WBC # BLD AUTO: 4.09 K/UL (ref 3.9–12.7)
WBC # BLD AUTO: 7.44 K/UL (ref 3.9–12.7)
WBC # BLD AUTO: 7.94 K/UL (ref 3.9–12.7)

## 2023-07-09 PROCEDURE — 83735 ASSAY OF MAGNESIUM: CPT

## 2023-07-09 PROCEDURE — 85610 PROTHROMBIN TIME: CPT | Mod: 91

## 2023-07-09 PROCEDURE — 63600175 PHARM REV CODE 636 W HCPCS: Performed by: STUDENT IN AN ORGANIZED HEALTH CARE EDUCATION/TRAINING PROGRAM

## 2023-07-09 PROCEDURE — 63600175 PHARM REV CODE 636 W HCPCS

## 2023-07-09 PROCEDURE — 99900035 HC TECH TIME PER 15 MIN (STAT)

## 2023-07-09 PROCEDURE — 20000000 HC ICU ROOM

## 2023-07-09 PROCEDURE — 82330 ASSAY OF CALCIUM: CPT | Mod: 91

## 2023-07-09 PROCEDURE — 25000003 PHARM REV CODE 250

## 2023-07-09 PROCEDURE — 82140 ASSAY OF AMMONIA: CPT | Performed by: INTERNAL MEDICINE

## 2023-07-09 PROCEDURE — P9021 RED BLOOD CELLS UNIT: HCPCS

## 2023-07-09 PROCEDURE — 84100 ASSAY OF PHOSPHORUS: CPT

## 2023-07-09 PROCEDURE — P9035 PLATELET PHERES LEUKOREDUCED: HCPCS

## 2023-07-09 PROCEDURE — 99291 PR CRITICAL CARE, E/M 30-74 MINUTES: ICD-10-PCS | Mod: GC,,, | Performed by: INTERNAL MEDICINE

## 2023-07-09 PROCEDURE — 27000221 HC OXYGEN, UP TO 24 HOURS

## 2023-07-09 PROCEDURE — C9113 INJ PANTOPRAZOLE SODIUM, VIA: HCPCS | Performed by: STUDENT IN AN ORGANIZED HEALTH CARE EDUCATION/TRAINING PROGRAM

## 2023-07-09 PROCEDURE — 94003 VENT MGMT INPAT SUBQ DAY: CPT

## 2023-07-09 PROCEDURE — 85610 PROTHROMBIN TIME: CPT | Performed by: STUDENT IN AN ORGANIZED HEALTH CARE EDUCATION/TRAINING PROGRAM

## 2023-07-09 PROCEDURE — 27201109 HC SYSTEM FECAL MANAGEMENT

## 2023-07-09 PROCEDURE — P9047 ALBUMIN (HUMAN), 25%, 50ML: HCPCS | Mod: JZ,JG | Performed by: INTERNAL MEDICINE

## 2023-07-09 PROCEDURE — 94761 N-INVAS EAR/PLS OXIMETRY MLT: CPT

## 2023-07-09 PROCEDURE — 82803 BLOOD GASES ANY COMBINATION: CPT

## 2023-07-09 PROCEDURE — 85730 THROMBOPLASTIN TIME PARTIAL: CPT | Mod: 91 | Performed by: NURSE PRACTITIONER

## 2023-07-09 PROCEDURE — 85730 THROMBOPLASTIN TIME PARTIAL: CPT

## 2023-07-09 PROCEDURE — 63600175 PHARM REV CODE 636 W HCPCS: Mod: JZ,JG | Performed by: INTERNAL MEDICINE

## 2023-07-09 PROCEDURE — 37799 UNLISTED PX VASCULAR SURGERY: CPT

## 2023-07-09 PROCEDURE — 80053 COMPREHEN METABOLIC PANEL: CPT

## 2023-07-09 PROCEDURE — 85025 COMPLETE CBC W/AUTO DIFF WBC: CPT

## 2023-07-09 PROCEDURE — 99900026 HC AIRWAY MAINTENANCE (STAT)

## 2023-07-09 PROCEDURE — 99291 CRITICAL CARE FIRST HOUR: CPT | Mod: GC,,, | Performed by: INTERNAL MEDICINE

## 2023-07-09 RX ORDER — POTASSIUM CHLORIDE 7.45 MG/ML
10 INJECTION INTRAVENOUS
Status: DISCONTINUED | OUTPATIENT
Start: 2023-07-09 | End: 2023-07-09

## 2023-07-09 RX ORDER — ALBUMIN HUMAN 250 G/1000ML
50 SOLUTION INTRAVENOUS ONCE
Status: COMPLETED | OUTPATIENT
Start: 2023-07-09 | End: 2023-07-09

## 2023-07-09 RX ORDER — HYDROCODONE BITARTRATE AND ACETAMINOPHEN 500; 5 MG/1; MG/1
TABLET ORAL
Status: DISCONTINUED | OUTPATIENT
Start: 2023-07-09 | End: 2023-07-10

## 2023-07-09 RX ORDER — DEXTROSE MONOHYDRATE 50 MG/ML
INJECTION, SOLUTION INTRAVENOUS CONTINUOUS
Status: DISCONTINUED | OUTPATIENT
Start: 2023-07-09 | End: 2023-07-10

## 2023-07-09 RX ORDER — POTASSIUM CHLORIDE 29.8 MG/ML
40 INJECTION INTRAVENOUS ONCE
Status: COMPLETED | OUTPATIENT
Start: 2023-07-09 | End: 2023-07-10

## 2023-07-09 RX ADMIN — FENTANYL CITRATE 50 MCG: 50 INJECTION INTRAMUSCULAR; INTRAVENOUS at 12:07

## 2023-07-09 RX ADMIN — SODIUM CHLORIDE, POTASSIUM CHLORIDE, SODIUM LACTATE AND CALCIUM CHLORIDE 500 ML: 600; 310; 30; 20 INJECTION, SOLUTION INTRAVENOUS at 11:07

## 2023-07-09 RX ADMIN — LEVETIRACETAM 1000 MG: 100 INJECTION, SOLUTION INTRAVENOUS at 08:07

## 2023-07-09 RX ADMIN — CEFTRIAXONE 1 G: 1 INJECTION, POWDER, FOR SOLUTION INTRAMUSCULAR; INTRAVENOUS at 08:07

## 2023-07-09 RX ADMIN — DEXTROSE MONOHYDRATE: 50 INJECTION, SOLUTION INTRAVENOUS at 09:07

## 2023-07-09 RX ADMIN — SODIUM CHLORIDE 500 ML: 9 INJECTION, SOLUTION INTRAVENOUS at 07:07

## 2023-07-09 RX ADMIN — ALBUMIN HUMAN 50 G: 0.25 SOLUTION INTRAVENOUS at 11:07

## 2023-07-09 RX ADMIN — MUPIROCIN: 20 OINTMENT TOPICAL at 08:07

## 2023-07-09 RX ADMIN — POTASSIUM CHLORIDE 40 MEQ: 29.8 INJECTION, SOLUTION INTRAVENOUS at 09:07

## 2023-07-09 RX ADMIN — SODIUM PHOSPHATE, MONOBASIC, MONOHYDRATE AND SODIUM PHOSPHATE, DIBASIC, ANHYDROUS 15 MMOL: 142; 276 INJECTION, SOLUTION INTRAVENOUS at 10:07

## 2023-07-09 RX ADMIN — PANTOPRAZOLE SODIUM 40 MG: 40 INJECTION, POWDER, FOR SOLUTION INTRAVENOUS at 08:07

## 2023-07-09 RX ADMIN — FENTANYL CITRATE 50 MCG: 50 INJECTION INTRAMUSCULAR; INTRAVENOUS at 05:07

## 2023-07-09 NOTE — SUBJECTIVE & OBJECTIVE
Interval History/Significant Events: Pt currently stable. Hematochezia is present but progressively becoming less frequent. Hgb stable. Dicussed with sister plans moving forward. SBT trial unsuccessful     Review of Systems   Unable to perform ROS: Patient unresponsive   Objective:     Vital Signs (Most Recent):  Temp: 98.1 °F (36.7 °C) (07/09/23 1100)  Pulse: 71 (07/09/23 1400)  Resp: 18 (07/09/23 1400)  BP: (!) 140/65 (07/09/23 1400)  SpO2: 100 % (07/09/23 1400) Vital Signs (24h Range):  Temp:  [96.4 °F (35.8 °C)-99.4 °F (37.4 °C)] 98.1 °F (36.7 °C)  Pulse:  [64-95] 71  Resp:  [7-21] 18  SpO2:  [93 %-100 %] 100 %  BP: (111-142)/(49-65) 140/65  Arterial Line BP: (103-132)/(41-57) 124/42   Weight: 54 kg (119 lb)  Body mass index is 21.77 kg/m².      Intake/Output Summary (Last 24 hours) at 7/9/2023 1421  Last data filed at 7/9/2023 1411  Gross per 24 hour   Intake 1352.05 ml   Output 515 ml   Net 837.05 ml            Physical Exam  Constitutional:       Appearance: She is ill-appearing and toxic-appearing.      Interventions: She is intubated.   HENT:      Head: Normocephalic.   Eyes:      General: Scleral icterus present.   Neck:      Thyroid: No thyroid mass or thyroid tenderness.      Vascular: No carotid bruit or hepatojugular reflux.   Cardiovascular:      Rate and Rhythm: Normal rate.      Pulses: Decreased pulses.      Heart sounds: Heart sounds are distant.   Pulmonary:      Effort: She is intubated.   Chest:      Chest wall: No mass, lacerations or deformity.   Breasts:     Right: No swelling.      Left: No swelling.   Abdominal:      General: Abdomen is flat.      Palpations: Abdomen is rigid.      Hernia: No hernia is present.   Genitourinary:     Vagina: Normal.      Comments: Copius amount of bright blood exiting rectum   Musculoskeletal:      Cervical back: Rigidity present.   Lymphadenopathy:      Cervical: No cervical adenopathy.   Skin:     Coloration: Skin is jaundiced.      Findings: Ecchymosis  present.   Neurological:      Mental Status: She is unresponsive.          Vents:  Vent Mode: Spont (07/09/23 1236)  Ventilator Initiated: Yes (07/05/23 1221)  Set Rate: 18 BPM (07/09/23 0732)  Vt Set: 300 mL (07/09/23 0732)  Pressure Support: 5 cmH20 (07/09/23 1236)  PEEP/CPAP: 5 cmH20 (07/09/23 1236)  Oxygen Concentration (%): 50 (07/09/23 1400)  Peak Airway Pressure: 10 cmH20 (07/09/23 1236)  Plateau Pressure: 8.7 cmH20 (07/09/23 1236)  Total Ve: 3.21 L/m (07/09/23 1236)  Negative Inspiratory Force (cm H2O): 0 (07/09/23 1300)  F/VT Ratio<105 (RSBI): (!) 14.86 (07/09/23 1236)  Lines/Drains/Airways       Central Venous Catheter Line  Duration             Percutaneous Central Line Insertion/Assessment - Triple Lumen  07/07/23 0232 Femoral Vein Right;Femoral Right 2 days              Drain  Duration                  Urethral Catheter 07/05/23 1332 Double-lumen 4 days         NG/OG Tube 07/06/23 0323 Center mouth 3 days              Airway  Duration                  Airway - Non-Surgical 07/05/23 1220 Endotracheal Tube 4 days              Arterial Line  Duration             Arterial Line 07/07/23 0232 Left Radial 2 days              Peripheral Intravenous Line  Duration                  Peripheral IV - Single Lumen 07/06/23 0546 18 G Anterior;Right Upper Arm 3 days         Peripheral IV - Single Lumen 07/06/23 2202 18 G Anterior;Left Forearm 2 days                  Significant Labs:    CBC/Anemia Profile:  Recent Labs   Lab 07/08/23  1809 07/08/23  2344 07/09/23  0440   WBC 6.67 7.44 7.94   HGB 8.0* 8.1* 8.4*   HCT 23.3* 24.8* 25.3*   PLT 64* 75* 74*   MCV 89 92 92   RDW 18.6* 19.2* 20.0*          Chemistries:  Recent Labs   Lab 07/08/23  0437 07/08/23  1559 07/09/23  0440   * 148* 149*   K 4.7 3.9 3.9   * 120* 121*   CO2 24 24 23   BUN 16 20 21*   CREATININE 0.4* 0.4* 0.5   CALCIUM 7.4* 7.6* 7.7*   ALBUMIN 1.9* 2.0* 2.1*   PROT 3.7* 3.8* 4.0*   BILITOT 3.2* 3.1* 3.5*   ALKPHOS 49* 54* 62   ALT 15 12 14    AST 28 27 32   MG 2.0 2.0 2.0   PHOS 2.6* 2.8 2.7         All pertinent labs within the past 24 hours have been reviewed.    Significant Imaging:  I have reviewed all pertinent imaging results/findings within the past 24 hours.

## 2023-07-09 NOTE — ANESTHESIA POSTPROCEDURE EVALUATION
Anesthesia Post Evaluation    Patient: Marely Hamilton    Procedure(s) Performed: * No procedures listed *    Final Anesthesia Type: general      Patient location during evaluation: ICU  Patient participation: No - Unable to Participate, Intubation  Level of consciousness: sedated  Post-procedure vital signs: reviewed and stable  Pain management: adequate  Airway patency: patent    PONV status at discharge: No PONV  Anesthetic complications: no      Cardiovascular status: blood pressure returned to baseline  Respiratory status: ETT and ventilator  Hydration status: euvolemic  Follow-up not needed.          Vitals Value Taken Time   /58 07/09/23 1602   Temp 36.4 °C (97.6 °F) 07/09/23 1500   Pulse 67 07/09/23 1855   Resp 18 07/09/23 1855   SpO2 100 % 07/09/23 1855   Vitals shown include unvalidated device data.      No case tracking events are documented in the log.      Pain/Jaison Score: No data recorded

## 2023-07-09 NOTE — NURSING
Pt repeatedly trying to get out of bed, swinging legs over the side multiple times.  Pt has been redirected multiple time and encouraged to stay in bed for her safety.  Pt continue's to say that she wants to die.  Pt is currently undirectable. When legs are placed back in bed she swings them back out.  MD notified of pt's behavior. Pt placed in restraints.  MD at .

## 2023-07-09 NOTE — PLAN OF CARE
Pt has been stable throughout the day.  She did have multiple dark red BMs.  Levo drip had to be turned on in the later part of the day.  Pt did not pass SBT.  Weeping noted throughout the day.  Pt remains off of sedation and is calm and cooperative.  Pt is able to shake head yes or no to communicate some needs. Will continue to monitor pt.

## 2023-07-09 NOTE — PROGRESS NOTES
[facility]  Critical Care Medicine  Progress Note    Patient Name: Marely Hamilton  MRN: [id number]  Admission Date: 7/5/2023  Hospital Length of Stay: 4 days  Code Status: DNR  Attending Provider: Maryann Devi MD  Primary Care Provider: Viktor Ross MD   Principal Problem: Gastrointestinal hemorrhage associated with duodenal ulcer    Subjective:     HPI:  Marely Hamilton is a 58 y.o. female with history of alcoholic cirrhosis, seizure disorder, DVT and IJ thrombus with recent admission for large retroperitoneal hemorrhage requiring IR embolization and IVC filter placement who presents for hematochezia. Onset this [facility] at [facility], alida blood per rectum per chart, sent to ED. Initially normotensive but then severe hypotension prompted initiation of levophed and intubation. Hgb 6.8, 2u pRBC given + 1u FFP ordered. Hgb 8.5 on 7/2. On levo and vaso currently. K 6.6 but renal function at baseline. Repeat pending. No prior EGDs on record.      Hospital/ICU Course:  Patient was seen at bedside, hematochezia still present post op by IR. HgB trended downward from 10 to 7.3. Platelets and pRBCs were given to the pt. Pt pressor requirement increased. Discussed with GI and IR regarding active bleeding post op, IR indicated there is not much else to do from their end bc they embolized a significant part of GDA. GI indicated there is no active plans to scope her, unless pressor requirement increases and Hgb trends down . Discuss with sister plans moving forward. SBT trial unsuccessful, pt remains weak. Patient given LR bolus and albumin.           Interval History/Significant Events: Pt currently stable. Hematochezia is present but progressively becoming less frequent. Hgb stable. Dicussed with sister plans moving forward. SBT trial unsuccessful     Review of Systems   Unable to perform ROS: Patient unresponsive   Objective:     Vital Signs (Most Recent):  Temp: 98.1 °F (36.7 °C) (07/09/23 1100)  Pulse:  71 (07/09/23 1400)  Resp: 18 (07/09/23 1400)  BP: (!) 140/65 (07/09/23 1400)  SpO2: 100 % (07/09/23 1400) Vital Signs (24h Range):  Temp:  [96.4 °F (35.8 °C)-99.4 °F (37.4 °C)] 98.1 °F (36.7 °C)  Pulse:  [64-95] 71  Resp:  [7-21] 18  SpO2:  [93 %-100 %] 100 %  BP: (111-142)/(49-65) 140/65  Arterial Line BP: (103-132)/(41-57) 124/42   Weight: 54 kg (119 lb)  Body mass index is 21.77 kg/m².      Intake/Output Summary (Last 24 hours) at 7/9/2023 1421  Last data filed at 7/9/2023 1411  Gross per 24 hour   Intake 1352.05 ml   Output 515 ml   Net 837.05 ml            Physical Exam  Constitutional:       Appearance: She is ill-appearing and toxic-appearing.      Interventions: She is intubated.   HENT:      Head: Normocephalic.   Eyes:      General: Scleral icterus present.   Neck:      Thyroid: No thyroid mass or thyroid tenderness.      Vascular: No carotid bruit or hepatojugular reflux.   Cardiovascular:      Rate and Rhythm: Normal rate.      Pulses: Decreased pulses.      Heart sounds: Heart sounds are distant.   Pulmonary:      Effort: She is intubated.   Chest:      Chest wall: No mass, lacerations or deformity.   Breasts:     Right: No swelling.      Left: No swelling.   Abdominal:      General: Abdomen is flat.      Palpations: Abdomen is rigid.      Hernia: No hernia is present.   Genitourinary:     Vagina: Normal.      Comments: Copius amount of bright blood exiting rectum   Musculoskeletal:      Cervical back: Rigidity present.   Lymphadenopathy:      Cervical: No cervical adenopathy.   Skin:     Coloration: Skin is jaundiced.      Findings: Ecchymosis present.   Neurological:      Mental Status: She is unresponsive.          Vents:  Vent Mode: Spont (07/09/23 1236)  Ventilator Initiated: Yes (07/05/23 1221)  Set Rate: 18 BPM (07/09/23 0732)  Vt Set: 300 mL (07/09/23 0732)  Pressure Support: 5 cmH20 (07/09/23 1236)  PEEP/CPAP: 5 cmH20 (07/09/23 1236)  Oxygen Concentration (%): 50 (07/09/23 1400)  Peak Airway  Pressure: 10 cmH20 (07/09/23 1236)  Plateau Pressure: 8.7 cmH20 (07/09/23 1236)  Total Ve: 3.21 L/m (07/09/23 1236)  Negative Inspiratory Force (cm H2O): 0 (07/09/23 1300)  F/VT Ratio<105 (RSBI): (!) 14.86 (07/09/23 1236)  Lines/Drains/Airways       Central Venous Catheter Line  Duration             Percutaneous Central Line Insertion/Assessment - Triple Lumen  07/07/23 0232 Femoral Vein Right;Femoral Right 2 days              Drain  Duration                  Urethral Catheter 07/05/23 1332 Double-lumen 4 days         NG/OG Tube 07/06/23 0323 Center mouth 3 days              Airway  Duration                  Airway - Non-Surgical 07/05/23 1220 Endotracheal Tube 4 days              Arterial Line  Duration             Arterial Line 07/07/23 0232 Left Radial 2 days              Peripheral Intravenous Line  Duration                  Peripheral IV - Single Lumen 07/06/23 0546 18 G Anterior;Right Upper Arm 3 days         Peripheral IV - Single Lumen 07/06/23 2202 18 G Anterior;Left Forearm 2 days                  Significant Labs:    CBC/Anemia Profile:  Recent Labs   Lab 07/08/23  1809 07/08/23  2344 07/09/23  0440   WBC 6.67 7.44 7.94   HGB 8.0* 8.1* 8.4*   HCT 23.3* 24.8* 25.3*   PLT 64* 75* 74*   MCV 89 92 92   RDW 18.6* 19.2* 20.0*          Chemistries:  Recent Labs   Lab 07/08/23  0437 07/08/23  1559 07/09/23  0440   * 148* 149*   K 4.7 3.9 3.9   * 120* 121*   CO2 24 24 23   BUN 16 20 21*   CREATININE 0.4* 0.4* 0.5   CALCIUM 7.4* 7.6* 7.7*   ALBUMIN 1.9* 2.0* 2.1*   PROT 3.7* 3.8* 4.0*   BILITOT 3.2* 3.1* 3.5*   ALKPHOS 49* 54* 62   ALT 15 12 14   AST 28 27 32   MG 2.0 2.0 2.0   PHOS 2.6* 2.8 2.7         All pertinent labs within the past 24 hours have been reviewed.    Significant Imaging:  I have reviewed all pertinent imaging results/findings within the past 24 hours.      ABG  Recent Labs   Lab 07/09/23  1236   PH 7.387   PO2 191*   PCO2 37.2   HCO3 22.4*   BE -3     Assessment/Plan:      Oncology  Acute blood loss anemia  - Maintain IV access with 2 large bore Ivs  - Intravascular resuscitation/support with IVFs   - Hold all NSAIDs and anticoagulants, unless contraindicated.  - Please correct any coagulopathy with platelets and FFP for goal of platelets >50K and INR <2.0  - Please notify GI team if there is significant change in patient's clinical status         Endocrine  Severe protein-calorie malnutrition  Nutrition consulted. Most recent weight and BMI monitored-     Measurements:  Wt Readings from Last 1 Encounters:   07/07/23 54 kg (119 lb)   Body mass index is 21.77 kg/m².    Patient has been screened and assessed by RD.    Malnutrition Type:  Context: social/environmental circumstances  Level: severe    Malnutrition Characteristic Summary:  Energy Intake (Malnutrition): less than or equal to 75% for greater than or equal to 1 month  Subcutaneous Fat (Malnutrition): severe depletion  Muscle Mass (Malnutrition): severe depletion    Interventions/Recommendations (treatment strategy):  1. When medically able, initiate TF regimen of Impact Peptide 1.5 @ goal rate of 45 mL/hr- provides 1620 kcals, 101 g pro, and 832 mL fluid. 2. If extubated and able to tolerate PO intake before next RD f/u, ADAT to regular- texture per SLP. 3. RD following.    GI  * Gastrointestinal hemorrhage associated with duodenal ulcer  See above     Hematochezia  -GI EGD clipped duodenal ulcers for IR reference  - Transfuse blood products for active bleeding   - trend CBC and follow  - IR embolized GDA for duodenal hyperemia     Other  Retroperitoneal bleed  -- CTA f/u  --CTA demonstrated stable retroperitoneal hematoma       Critical Care Daily Checklist:    A: Awake: RASS Goal/Actual Goal: RASS Goal: 0-->alert and calm  Actual:     B: Spontaneous Breathing Trial Performed? Spon. Breathing Trial Initiated?: Initiated (07/09/23 0735)   C: SAT & SBT Coordinated?  n/a                      D: Delirium: CAM-ICU Overall  CAM-ICU: Positive   E: Early Mobility Performed? Yes   F: Feeding Goal: Goals: Will meet % EEN/EPN by next RD f/u.  Status: Nutrition Goal Status: new   Current Diet Order   Procedures    Diet NPO      AS: Analgesia/Sedation Fentanyl    T: Thromboembolic Prophylaxis n/a   H: HOB > 300 Yes   U: Stress Ulcer Prophylaxis (if needed) PPI   G: Glucose Control SSI   B: Bowel Function Stool Occurrence: 1   I: Indwelling Catheter (Lines & Baugh) Necessity baugh   D: De-escalation of Antimicrobials/Pharmacotherapies n/a    Plan for the day/ETD SBT trial     Code Status:  Family/Goals of Care: DNR  n/a       Critical secondary to Patient has a condition that poses threat to life and bodily function: Hemorrhagic shock       Critical care was time spent personally by me on the following activities: development of treatment plan with patient or surrogate and bedside caregivers, discussions with consultants, evaluation of patient's response to treatment, examination of patient, ordering and performing treatments and interventions, ordering and review of laboratory studies, ordering and review of radiographic studies, pulse oximetry, re-evaluation of patient's condition. This critical care time did not overlap with that of any other provider or involve time for any procedures.     Phong Jean,   Critical Care Medicine  [facility] - Cardiac Medical ICU

## 2023-07-10 ENCOUNTER — ANESTHESIA EVENT (OUTPATIENT)
Dept: ENDOSCOPY | Facility: HOSPITAL | Age: 58
DRG: 356 | End: 2023-07-10
Payer: MEDICARE

## 2023-07-10 LAB
ALBUMIN SERPL BCP-MCNC: 2.7 G/DL (ref 3.5–5.2)
ALBUMIN SERPL BCP-MCNC: 2.7 G/DL (ref 3.5–5.2)
ALBUMIN SERPL BCP-MCNC: 2.8 G/DL (ref 3.5–5.2)
ALP SERPL-CCNC: 54 U/L (ref 55–135)
ALP SERPL-CCNC: 55 U/L (ref 55–135)
ALP SERPL-CCNC: 60 U/L (ref 55–135)
ALT SERPL W/O P-5'-P-CCNC: 10 U/L (ref 10–44)
ANION GAP SERPL CALC-SCNC: 4 MMOL/L (ref 8–16)
ANION GAP SERPL CALC-SCNC: 8 MMOL/L (ref 8–16)
ANION GAP SERPL CALC-SCNC: 8 MMOL/L (ref 8–16)
APTT PPP: 28.3 SEC (ref 21–32)
AST SERPL-CCNC: 23 U/L (ref 10–40)
BASOPHILS # BLD AUTO: 0.01 K/UL (ref 0–0.2)
BASOPHILS # BLD AUTO: 0.01 K/UL (ref 0–0.2)
BASOPHILS # BLD AUTO: 0.02 K/UL (ref 0–0.2)
BASOPHILS # BLD AUTO: 0.02 K/UL (ref 0–0.2)
BASOPHILS NFR BLD: 0.3 % (ref 0–1.9)
BASOPHILS NFR BLD: 0.3 % (ref 0–1.9)
BASOPHILS NFR BLD: 0.5 % (ref 0–1.9)
BASOPHILS NFR BLD: 0.5 % (ref 0–1.9)
BILIRUB SERPL-MCNC: 2.6 MG/DL (ref 0.1–1)
BILIRUB SERPL-MCNC: 2.7 MG/DL (ref 0.1–1)
BILIRUB SERPL-MCNC: 3.3 MG/DL (ref 0.1–1)
BLD PROD TYP BPU: NORMAL
BLOOD UNIT EXPIRATION DATE: NORMAL
BLOOD UNIT TYPE CODE: 5100
BLOOD UNIT TYPE: NORMAL
BUN SERPL-MCNC: 10 MG/DL (ref 6–20)
BUN SERPL-MCNC: 11 MG/DL (ref 6–20)
BUN SERPL-MCNC: 17 MG/DL (ref 6–20)
CA-I BLDV-SCNC: 1.11 MMOL/L (ref 1.06–1.42)
CA-I BLDV-SCNC: 1.19 MMOL/L (ref 1.06–1.42)
CALCIUM SERPL-MCNC: 7.9 MG/DL (ref 8.7–10.5)
CALCIUM SERPL-MCNC: 7.9 MG/DL (ref 8.7–10.5)
CALCIUM SERPL-MCNC: 8 MG/DL (ref 8.7–10.5)
CHLORIDE SERPL-SCNC: 118 MMOL/L (ref 95–110)
CHLORIDE SERPL-SCNC: 120 MMOL/L (ref 95–110)
CHLORIDE SERPL-SCNC: 120 MMOL/L (ref 95–110)
CO2 SERPL-SCNC: 21 MMOL/L (ref 23–29)
CO2 SERPL-SCNC: 22 MMOL/L (ref 23–29)
CO2 SERPL-SCNC: 23 MMOL/L (ref 23–29)
CODING SYSTEM: NORMAL
CREAT SERPL-MCNC: 0.4 MG/DL (ref 0.5–1.4)
CREAT SERPL-MCNC: 0.4 MG/DL (ref 0.5–1.4)
CREAT SERPL-MCNC: 0.5 MG/DL (ref 0.5–1.4)
CROSSMATCH INTERPRETATION: NORMAL
DIFFERENTIAL METHOD: ABNORMAL
DISPENSE STATUS: NORMAL
EOSINOPHIL # BLD AUTO: 0.1 K/UL (ref 0–0.5)
EOSINOPHIL # BLD AUTO: 0.1 K/UL (ref 0–0.5)
EOSINOPHIL # BLD AUTO: 0.2 K/UL (ref 0–0.5)
EOSINOPHIL # BLD AUTO: 0.2 K/UL (ref 0–0.5)
EOSINOPHIL NFR BLD: 2.8 % (ref 0–8)
EOSINOPHIL NFR BLD: 3.1 % (ref 0–8)
EOSINOPHIL NFR BLD: 3.6 % (ref 0–8)
EOSINOPHIL NFR BLD: 3.6 % (ref 0–8)
ERYTHROCYTE [DISTWIDTH] IN BLOOD BY AUTOMATED COUNT: 18.9 % (ref 11.5–14.5)
ERYTHROCYTE [DISTWIDTH] IN BLOOD BY AUTOMATED COUNT: 18.9 % (ref 11.5–14.5)
ERYTHROCYTE [DISTWIDTH] IN BLOOD BY AUTOMATED COUNT: 19 % (ref 11.5–14.5)
ERYTHROCYTE [DISTWIDTH] IN BLOOD BY AUTOMATED COUNT: 20.2 % (ref 11.5–14.5)
EST. GFR  (NO RACE VARIABLE): >60 ML/MIN/1.73 M^2
GLUCOSE SERPL-MCNC: 101 MG/DL (ref 70–110)
GLUCOSE SERPL-MCNC: 103 MG/DL (ref 70–110)
GLUCOSE SERPL-MCNC: 177 MG/DL (ref 70–110)
HCT VFR BLD AUTO: 22.8 % (ref 37–48.5)
HCT VFR BLD AUTO: 23.4 % (ref 37–48.5)
HCT VFR BLD AUTO: 23.4 % (ref 37–48.5)
HCT VFR BLD AUTO: 24.5 % (ref 37–48.5)
HGB BLD-MCNC: 7.6 G/DL (ref 12–16)
HGB BLD-MCNC: 7.7 G/DL (ref 12–16)
HGB BLD-MCNC: 7.7 G/DL (ref 12–16)
HGB BLD-MCNC: 7.8 G/DL (ref 12–16)
IMM GRANULOCYTES # BLD AUTO: 0.01 K/UL (ref 0–0.04)
IMM GRANULOCYTES # BLD AUTO: 0.01 K/UL (ref 0–0.04)
IMM GRANULOCYTES # BLD AUTO: 0.02 K/UL (ref 0–0.04)
IMM GRANULOCYTES # BLD AUTO: 0.02 K/UL (ref 0–0.04)
IMM GRANULOCYTES NFR BLD AUTO: 0.2 % (ref 0–0.5)
IMM GRANULOCYTES NFR BLD AUTO: 0.2 % (ref 0–0.5)
IMM GRANULOCYTES NFR BLD AUTO: 0.6 % (ref 0–0.5)
IMM GRANULOCYTES NFR BLD AUTO: 0.6 % (ref 0–0.5)
INR PPP: 1.5 (ref 0.8–1.2)
INR PPP: 1.6 (ref 0.8–1.2)
LYMPHOCYTES # BLD AUTO: 0.6 K/UL (ref 1–4.8)
LYMPHOCYTES # BLD AUTO: 0.8 K/UL (ref 1–4.8)
LYMPHOCYTES NFR BLD: 18.9 % (ref 18–48)
LYMPHOCYTES NFR BLD: 19.7 % (ref 18–48)
LYMPHOCYTES NFR BLD: 19.7 % (ref 18–48)
LYMPHOCYTES NFR BLD: 21.2 % (ref 18–48)
MAGNESIUM SERPL-MCNC: 2.1 MG/DL (ref 1.6–2.6)
MCH RBC QN AUTO: 29.9 PG (ref 27–31)
MCH RBC QN AUTO: 30.8 PG (ref 27–31)
MCH RBC QN AUTO: 30.8 PG (ref 27–31)
MCH RBC QN AUTO: 31 PG (ref 27–31)
MCHC RBC AUTO-ENTMCNC: 31.8 G/DL (ref 32–36)
MCHC RBC AUTO-ENTMCNC: 32.9 G/DL (ref 32–36)
MCHC RBC AUTO-ENTMCNC: 32.9 G/DL (ref 32–36)
MCHC RBC AUTO-ENTMCNC: 33.3 G/DL (ref 32–36)
MCV RBC AUTO: 93 FL (ref 82–98)
MCV RBC AUTO: 94 FL (ref 82–98)
MONOCYTES # BLD AUTO: 0.3 K/UL (ref 0.3–1)
MONOCYTES # BLD AUTO: 0.4 K/UL (ref 0.3–1)
MONOCYTES NFR BLD: 10.6 % (ref 4–15)
MONOCYTES NFR BLD: 10.6 % (ref 4–15)
MONOCYTES NFR BLD: 10.7 % (ref 4–15)
MONOCYTES NFR BLD: 8.5 % (ref 4–15)
NEUTROPHILS # BLD AUTO: 2.2 K/UL (ref 1.8–7.7)
NEUTROPHILS # BLD AUTO: 2.3 K/UL (ref 1.8–7.7)
NEUTROPHILS # BLD AUTO: 2.7 K/UL (ref 1.8–7.7)
NEUTROPHILS # BLD AUTO: 2.7 K/UL (ref 1.8–7.7)
NEUTROPHILS NFR BLD: 64.4 % (ref 38–73)
NEUTROPHILS NFR BLD: 65.4 % (ref 38–73)
NEUTROPHILS NFR BLD: 65.4 % (ref 38–73)
NEUTROPHILS NFR BLD: 68.6 % (ref 38–73)
NRBC BLD-RTO: 0 /100 WBC
PHOSPHATE SERPL-MCNC: 2 MG/DL (ref 2.7–4.5)
PHOSPHATE SERPL-MCNC: 2.1 MG/DL (ref 2.7–4.5)
PHOSPHATE SERPL-MCNC: 2.9 MG/DL (ref 2.7–4.5)
PLATELET # BLD AUTO: 48 K/UL (ref 150–450)
PLATELET # BLD AUTO: 52 K/UL (ref 150–450)
PLATELET # BLD AUTO: 52 K/UL (ref 150–450)
PLATELET # BLD AUTO: 62 K/UL (ref 150–450)
PMV BLD AUTO: 10.4 FL (ref 9.2–12.9)
PMV BLD AUTO: 9.8 FL (ref 9.2–12.9)
PMV BLD AUTO: 9.8 FL (ref 9.2–12.9)
PMV BLD AUTO: 9.9 FL (ref 9.2–12.9)
POCT GLUCOSE: 114 MG/DL (ref 70–110)
POCT GLUCOSE: 183 MG/DL (ref 70–110)
POCT GLUCOSE: 210 MG/DL (ref 70–110)
POCT GLUCOSE: 302 MG/DL (ref 70–110)
POCT GLUCOSE: 63 MG/DL (ref 70–110)
POCT GLUCOSE: 70 MG/DL (ref 70–110)
POTASSIUM SERPL-SCNC: 3.2 MMOL/L (ref 3.5–5.1)
POTASSIUM SERPL-SCNC: 3.8 MMOL/L (ref 3.5–5.1)
POTASSIUM SERPL-SCNC: 4 MMOL/L (ref 3.5–5.1)
PROT SERPL-MCNC: 4.3 G/DL (ref 6–8.4)
PROT SERPL-MCNC: 4.4 G/DL (ref 6–8.4)
PROT SERPL-MCNC: 4.4 G/DL (ref 6–8.4)
PROTHROMBIN TIME: 16 SEC (ref 9–12.5)
PROTHROMBIN TIME: 16.7 SEC (ref 9–12.5)
RBC # BLD AUTO: 2.45 M/UL (ref 4–5.4)
RBC # BLD AUTO: 2.5 M/UL (ref 4–5.4)
RBC # BLD AUTO: 2.5 M/UL (ref 4–5.4)
RBC # BLD AUTO: 2.61 M/UL (ref 4–5.4)
SODIUM SERPL-SCNC: 145 MMOL/L (ref 136–145)
SODIUM SERPL-SCNC: 149 MMOL/L (ref 136–145)
SODIUM SERPL-SCNC: 150 MMOL/L (ref 136–145)
UNIT NUMBER: NORMAL
WBC # BLD AUTO: 3.18 K/UL (ref 3.9–12.7)
WBC # BLD AUTO: 3.54 K/UL (ref 3.9–12.7)
WBC # BLD AUTO: 4.17 K/UL (ref 3.9–12.7)
WBC # BLD AUTO: 4.17 K/UL (ref 3.9–12.7)

## 2023-07-10 PROCEDURE — 94761 N-INVAS EAR/PLS OXIMETRY MLT: CPT

## 2023-07-10 PROCEDURE — 82330 ASSAY OF CALCIUM: CPT

## 2023-07-10 PROCEDURE — 63600175 PHARM REV CODE 636 W HCPCS: Performed by: STUDENT IN AN ORGANIZED HEALTH CARE EDUCATION/TRAINING PROGRAM

## 2023-07-10 PROCEDURE — 84100 ASSAY OF PHOSPHORUS: CPT | Mod: 91 | Performed by: INTERNAL MEDICINE

## 2023-07-10 PROCEDURE — 99232 SBSQ HOSP IP/OBS MODERATE 35: CPT | Mod: ,,,

## 2023-07-10 PROCEDURE — 25000003 PHARM REV CODE 250

## 2023-07-10 PROCEDURE — 94003 VENT MGMT INPAT SUBQ DAY: CPT

## 2023-07-10 PROCEDURE — 85730 THROMBOPLASTIN TIME PARTIAL: CPT

## 2023-07-10 PROCEDURE — 99291 CRITICAL CARE FIRST HOUR: CPT | Mod: GC,,, | Performed by: INTERNAL MEDICINE

## 2023-07-10 PROCEDURE — 99291 PR CRITICAL CARE, E/M 30-74 MINUTES: ICD-10-PCS | Mod: GC,,, | Performed by: INTERNAL MEDICINE

## 2023-07-10 PROCEDURE — 20000000 HC ICU ROOM

## 2023-07-10 PROCEDURE — 85025 COMPLETE CBC W/AUTO DIFF WBC: CPT | Mod: 91

## 2023-07-10 PROCEDURE — 85025 COMPLETE CBC W/AUTO DIFF WBC: CPT | Performed by: INTERNAL MEDICINE

## 2023-07-10 PROCEDURE — 84100 ASSAY OF PHOSPHORUS: CPT

## 2023-07-10 PROCEDURE — 27000221 HC OXYGEN, UP TO 24 HOURS

## 2023-07-10 PROCEDURE — 99232 PR SUBSEQUENT HOSPITAL CARE,LEVL II: ICD-10-PCS | Mod: ,,,

## 2023-07-10 PROCEDURE — 99900026 HC AIRWAY MAINTENANCE (STAT)

## 2023-07-10 PROCEDURE — 63600175 PHARM REV CODE 636 W HCPCS

## 2023-07-10 PROCEDURE — 85610 PROTHROMBIN TIME: CPT

## 2023-07-10 PROCEDURE — 80053 COMPREHEN METABOLIC PANEL: CPT | Mod: 91 | Performed by: INTERNAL MEDICINE

## 2023-07-10 PROCEDURE — P9035 PLATELET PHERES LEUKOREDUCED: HCPCS

## 2023-07-10 PROCEDURE — C9113 INJ PANTOPRAZOLE SODIUM, VIA: HCPCS | Performed by: STUDENT IN AN ORGANIZED HEALTH CARE EDUCATION/TRAINING PROGRAM

## 2023-07-10 PROCEDURE — 25500020 PHARM REV CODE 255: Performed by: INTERNAL MEDICINE

## 2023-07-10 PROCEDURE — 83735 ASSAY OF MAGNESIUM: CPT

## 2023-07-10 PROCEDURE — 80053 COMPREHEN METABOLIC PANEL: CPT | Mod: 91

## 2023-07-10 PROCEDURE — 99900035 HC TECH TIME PER 15 MIN (STAT)

## 2023-07-10 RX ORDER — HYDROCODONE BITARTRATE AND ACETAMINOPHEN 500; 5 MG/1; MG/1
TABLET ORAL
Status: DISCONTINUED | OUTPATIENT
Start: 2023-07-10 | End: 2023-07-14

## 2023-07-10 RX ADMIN — POTASSIUM BICARBONATE 40 MEQ: 391 TABLET, EFFERVESCENT ORAL at 06:07

## 2023-07-10 RX ADMIN — LEVETIRACETAM 1000 MG: 100 INJECTION, SOLUTION INTRAVENOUS at 08:07

## 2023-07-10 RX ADMIN — PANTOPRAZOLE SODIUM 40 MG: 40 INJECTION, POWDER, FOR SOLUTION INTRAVENOUS at 08:07

## 2023-07-10 RX ADMIN — IOHEXOL 75 ML: 350 INJECTION, SOLUTION INTRAVENOUS at 05:07

## 2023-07-10 RX ADMIN — CEFTRIAXONE 1 G: 1 INJECTION, POWDER, FOR SOLUTION INTRAMUSCULAR; INTRAVENOUS at 08:07

## 2023-07-10 RX ADMIN — SODIUM PHOSPHATE, MONOBASIC, MONOHYDRATE AND SODIUM PHOSPHATE, DIBASIC, ANHYDROUS 20.01 MMOL: 142; 276 INJECTION, SOLUTION INTRAVENOUS at 10:07

## 2023-07-10 RX ADMIN — MUPIROCIN: 20 OINTMENT TOPICAL at 08:07

## 2023-07-10 RX ADMIN — INSULIN ASPART 8 UNITS: 100 INJECTION, SOLUTION INTRAVENOUS; SUBCUTANEOUS at 06:07

## 2023-07-10 NOTE — PROGRESS NOTES
[facility]  Gastroenterology  Progress Note    Patient Name: Marely Hamilton  MRN: [id number]  Admission Date: 7/5/2023  Hospital Length of Stay: 5 days  Code Status: DNR   Attending Provider: Cody Brothers MD  Consulting Provider: Robina Damian NP  Primary Care Physician: Viktor Ross MD  Principal Problem: Gastrointestinal hemorrhage associated with duodenal ulcer    Subjective:     Interval History: Followed up with patient s/p EGD 7/6 with duodenal ulcers noted with small ooze. Clip placed. Continued bleeding and CTA completed. Empiric embolization placed at the location of the clips. Patient has continued to have decreasing blood counts requiring transfusion and pressor requirements. Received 1U pRBC and 1 platelet overnight. Repeat CTA done this AM without extravasation. BMS in place with dark/maroon output noted. Still requiring low dose Levophed. Follows commands.    Review of Systems   Unable to perform ROS: Intubated   Objective:     Vital Signs (Most Recent):  Temp: 98 °F (36.7 °C) (07/10/23 1200)  Pulse: 64 (07/10/23 1524)  Resp: 18 (07/10/23 1524)  BP: (!) 104/57 (07/10/23 1300)  SpO2: 100 % (07/10/23 1524) Vital Signs (24h Range):  Temp:  [97.4 °F (36.3 °C)-98 °F (36.7 °C)] 98 °F (36.7 °C)  Pulse:  [58-74] 64  Resp:  [10-50] 18  SpO2:  [97 %-100 %] 100 %  BP: (103-130)/(55-61) 104/57  Arterial Line BP: (114-139)/(38-53) 119/44     Weight: 54 kg (119 lb) (07/07/23 0400)  Body mass index is 21.77 kg/m².      Intake/Output Summary (Last 24 hours) at 7/10/2023 1556  Last data filed at 7/10/2023 0700  Gross per 24 hour   Intake 1720.96 ml   Output 1025 ml   Net 695.96 ml         Lines/Drains/Airways       Central Venous Catheter Line  Duration             Percutaneous Central Line Insertion/Assessment - Triple Lumen  07/07/23 0232 Femoral Vein Right;Femoral Right 3 days              Drain  Duration                  Urethral Catheter 07/05/23 1332 Double-lumen 5 days         NG/OG Tube  07/06/23 0323 Center mouth 4 days         Fecal Incontinence  07/09/23 2300 <1 day              Airway  Duration                  Airway - Non-Surgical 07/05/23 1220 Endotracheal Tube 5 days              Arterial Line  Duration             Arterial Line 07/07/23 0232 Left Radial 3 days              Peripheral Intravenous Line  Duration                  Peripheral IV - Single Lumen 07/06/23 0546 18 G Anterior;Right Upper Arm 4 days         Peripheral IV - Single Lumen 07/06/23 2202 18 G Anterior;Left Forearm 3 days                     Physical Exam  Vitals and nursing note reviewed.   Constitutional:       Appearance: She is ill-appearing.   HENT:      Mouth/Throat:      Mouth: Mucous membranes are dry.      Comments: OG in place with bloody contents  Eyes:      General: Scleral icterus present.   Abdominal:      General: Abdomen is flat. Bowel sounds are normal. There is no distension.      Palpations: Abdomen is soft. There is no mass.      Tenderness: There is no abdominal tenderness.      Hernia: No hernia is present.   Musculoskeletal:         General: Swelling present.   Skin:     General: Skin is warm.      Coloration: Skin is jaundiced and pale.      Findings: Bruising present. No erythema.   Neurological:      General: No focal deficit present.      Comments: Intubated, sedated        Significant Labs:  CBC:   Recent Labs   Lab 07/09/23  2047 07/10/23  0358 07/10/23  0826   WBC 4.09 4.17  4.17 3.54*   HGB 6.3* 7.7*  7.7* 7.6*   HCT 19.3* 23.4*  23.4* 22.8*   PLT 43* 52*  52* 48*       CMP:   Recent Labs   Lab 07/10/23  0358   *   CALCIUM 7.9*   ALBUMIN 2.8*   PROT 4.3*   *   K 3.8   CO2 22*   *   BUN 17   CREATININE 0.5   ALKPHOS 54*   ALT 10   AST 23   BILITOT 3.3*       Coagulation:   Recent Labs   Lab 07/10/23  0358 07/10/23  0826   INR  --  1.6*   APTT 28.3  --            Significant Imaging:  Imaging results within the past 24 hours have been reviewed.    EGD  7/6/2023  Impression:            - Esophageal ulcer with no bleeding and no                          stigmata of recent bleeding.                          - No esophageal varices.                          - Non bleeding gastric ulcer likely due to OG tube                          trauma.                          - Non-bleeding duodenal ulcers with no stigmata of                          bleeding. Small amount of oozing noted. Clip was                          placed for marking.                          - No specimens collected.   Recommendation:        - Return patient to ICU for ongoing care.                          - NPO.                          -Continue high dose IV PPI.                          - If significant overt GI bleeding occurs then                          consider STAT CTA abdomen and pelvis with bleeding                          protocol and IR consult for possible embolization                          vs empiric embolization by IR targeting the clip                          that was placed adjacent to the duodenal ulcers.   Attending Participation:        I personally performed the entire procedure.   Bernice Guillen MD   7/6/2023 7:52:51 PM     CTA GI Bleed 7/10/2023  Impression:     Interval gastroduodenal artery coil embolization.  Evaluation of the duodenum is somewhat limited secondary to beam hardening artifact however there is no definite evidence of active contrast extravasation to suggest active bleeding.     Stable large left retroperitoneal hematoma and smaller left iliacus hematoma.  No new retroperitoneal hematoma.     Cardiomegaly and reflux of contrast into the hepatic IVC, which can indicate right heart or valvular disease.     Cirrhotic liver morphology with stigmata of portal hypertension including splenomegaly, small volume ascites, and collateral vessels.     Severe anasarca, similar but worse from prior.     Small bilateral pleural effusions.     Additional stable findings  above.     Electronically signed by resident: Amari Shabazz  Date:                                            07/10/2023  Time:                                           06:17    Assessment/Plan:     Oncology  Acute blood loss anemia  58 F with alcoholic cirrhosis admitted with hemorrhagic shock. EGD 7/06 with oozing duodenal ulcers but nothing treatable, clip placed. Developed worsening hemodynamics and overt bleeding leading to transfusion of multiple blood products. Ultimately empirically embolized with IR on 7/07 near location of clip but no active bleeding seen on CTA. She remains intubated with ongoing dark bowel movements. Received 1U pRBC overnight, 1 platelet. CTA negative this AM.     Recommendations:  - NPO after midnight for possible EGD tomorrow.   - CBC every 8 hours or as clinically indicated  - Continue IV PPI 40 mg BID  - Correct coagulopathy for INR <2.0 and Platelets >50K  - Please maintain 2 large bore IV's (18g or larger).   - Please hold all NSAIDs and anticoagulants.            Thank you for your consult. I will follow-up with patient. Please contact us if you have any additional questions.    Robina Damian NP  Gastroenterology  [facility]

## 2023-07-10 NOTE — ASSESSMENT & PLAN NOTE
- GI EGD clipped duodenal ulcers for IR reference  - Transfuse blood products for active bleeding   - trend CBC and follow  - IR embolized GDA for duodenal hyperemia

## 2023-07-10 NOTE — PROGRESS NOTES
[facility]  Critical Care Medicine  Progress Note    Patient Name: Marely Hamilton  MRN: [id number]  Admission Date: 7/5/2023  Hospital Length of Stay: 5 days  Code Status: DNR  Attending Provider: Cody Brothers MD  Primary Care Provider: Viktor Ross MD   Principal Problem: Gastrointestinal hemorrhage associated with duodenal ulcer    Subjective:     HPI:  Marely Hamilton is a 58 y.o. female with history of alcoholic cirrhosis, seizure disorder, DVT and IJ thrombus with recent admission for large retroperitoneal hemorrhage requiring IR embolization and IVC filter placement who presents for hematochezia. Onset this morning at [facility], alida blood per rectum per chart, sent to ED. Initially normotensive but then severe hypotension prompted initiation of levophed and intubation. Hgb 6.8, 2u pRBC given + 1u FFP ordered. Hgb 8.5 on 7/2. On levo and vaso currently. K 6.6 but renal function at baseline. Repeat pending. No prior EGDs on record.      Hospital/ICU Course:  Patient was seen at bedside, hematochezia still present post op by IR. Patient has required many transfusions of pRBCs and platelets due to ongoing down trending of Hgb. No clear source of ongoing hemorrhage by imaging. Patient has continued to require pressors to maintain MAP >65. Discussed with GI and IR regarding active bleeding post op, IR indicated there is not much else to do from their end bc they embolized a significant part of GDA. GI indicated there is no active plans to scope her, unless pressor requirement increases and Hgb trends down. Discuss with sister plans moving forward. Patient had difficulty in maintaining saturation weaning from vent. SBT trials pending.      Interval History/Significant Events: Patient required one unit of pRBCs and 2 units of platelets overnight. Hgb 6.3 -> 7.7. Reacting to commands off sedation. SBT failed yesterday. Melena continues.    Review of Systems   Unable to perform ROS: Patient  unresponsive   Objective:     Vital Signs (Most Recent):  Temp: 98 °F (36.7 °C) (07/10/23 0715)  Pulse: 64 (07/10/23 1144)  Resp: 10 (07/10/23 1144)  BP: 130/61 (07/09/23 1600)  SpO2: 100 % (07/10/23 1144) Vital Signs (24h Range):  Temp:  [97.4 °F (36.3 °C)-98 °F (36.7 °C)] 98 °F (36.7 °C)  Pulse:  [58-74] 64  Resp:  [10-52] 10  SpO2:  [97 %-100 %] 100 %  BP: (130-140)/(61-65) 130/61  Arterial Line BP: (112-139)/(38-53) 119/44   Weight: 54 kg (119 lb)  Body mass index is 21.77 kg/m².      Intake/Output Summary (Last 24 hours) at 7/10/2023 1305  Last data filed at 7/10/2023 0700  Gross per 24 hour   Intake 2521.61 ml   Output 1320 ml   Net 1201.61 ml            Physical Exam  Constitutional:       Appearance: She is ill-appearing and toxic-appearing.      Interventions: She is intubated.   HENT:      Head: Normocephalic.   Eyes:      General: Scleral icterus present.   Neck:      Thyroid: No thyroid mass or thyroid tenderness.      Vascular: No carotid bruit or hepatojugular reflux.   Cardiovascular:      Rate and Rhythm: Normal rate.      Pulses: Decreased pulses.      Heart sounds: Heart sounds are distant.   Pulmonary:      Effort: She is intubated.   Chest:      Chest wall: No mass, lacerations or deformity.   Breasts:     Right: No swelling.      Left: No swelling.   Abdominal:      General: Abdomen is flat.      Palpations: Abdomen is rigid.      Hernia: No hernia is present.   Genitourinary:     Vagina: Normal.   Musculoskeletal:      Cervical back: Rigidity present.   Lymphadenopathy:      Cervical: No cervical adenopathy.   Skin:     Coloration: Skin is jaundiced.      Findings: Ecchymosis present.   Neurological:      Mental Status: She is unresponsive.          Vents:  Vent Mode: Spont (07/10/23 1144)  Ventilator Initiated: Yes (07/05/23 1221)  Set Rate: 18 BPM (07/10/23 0738)  Vt Set: 300 mL (07/10/23 0738)  Pressure Support: 5 cmH20 (07/10/23 1144)  PEEP/CPAP: 5 cmH20 (07/10/23 1144)  Oxygen  Concentration (%): 30 (07/10/23 1144)  Peak Airway Pressure: 10 cmH20 (07/10/23 1144)  Plateau Pressure: 0 cmH20 (07/10/23 1144)  Total Ve: 4.9 L/m (07/10/23 1144)  Negative Inspiratory Force (cm H2O): 0 (07/10/23 1144)  F/VT Ratio<105 (RSBI): (!) 24.81 (07/10/23 1144)  Lines/Drains/Airways       Central Venous Catheter Line  Duration             Percutaneous Central Line Insertion/Assessment - Triple Lumen  07/07/23 0232 Femoral Vein Right;Femoral Right 3 days              Drain  Duration                  NG/OG Tube 07/06/23 0323 Center mouth 4 days         Urethral Catheter 07/05/23 1332 Double-lumen 4 days         Fecal Incontinence  07/09/23 2300 <1 day              Airway  Duration                  Airway - Non-Surgical 07/05/23 1220 Endotracheal Tube 5 days              Arterial Line  Duration             Arterial Line 07/07/23 0232 Left Radial 3 days              Peripheral Intravenous Line  Duration                  Peripheral IV - Single Lumen 07/06/23 0546 18 G Anterior;Right Upper Arm 4 days         Peripheral IV - Single Lumen 07/06/23 2202 18 G Anterior;Left Forearm 3 days                  Significant Labs:    CBC/Anemia Profile:  Recent Labs   Lab 07/09/23  2047 07/10/23  0358 07/10/23  0826   WBC 4.09 4.17  4.17 3.54*   HGB 6.3* 7.7*  7.7* 7.6*   HCT 19.3* 23.4*  23.4* 22.8*   PLT 43* 52*  52* 48*   MCV 95 94  94 93   RDW 20.9* 18.9*  18.9* 19.0*          Chemistries:  Recent Labs   Lab 07/08/23  1559 07/09/23  0440 07/09/23  1638 07/10/23  0358   * 149* 151* 150*   K 3.9 3.9 3.5 3.8   * 121* 122* 120*   CO2 24 23 22* 22*   BUN 20 21* 20 17   CREATININE 0.4* 0.5 0.5 0.5   CALCIUM 7.6* 7.7* 8.0* 7.9*   ALBUMIN 2.0* 2.1* 2.9* 2.8*   PROT 3.8* 4.0* 4.2* 4.3*   BILITOT 3.1* 3.5* 3.5* 3.3*   ALKPHOS 54* 62 48* 54*   ALT 12 14 10 10   AST 27 32 23 23   MG 2.0 2.0  --  2.1   PHOS 2.8 2.7 2.5* 2.9         All pertinent labs within the past 24 hours have been reviewed.    Significant  Imaging:  I have reviewed all pertinent imaging results/findings within the past 24 hours.      ABG  Recent Labs   Lab 07/09/23  1236   PH 7.387   PO2 191*   PCO2 37.2   HCO3 22.4*   BE -3     Assessment/Plan:     Oncology  Acute blood loss anemia  - Maintain IV access with 2 large bore Ivs  - Intravascular resuscitation/support with IVFs   - Hold all NSAIDs and anticoagulants, unless contraindicated.  - Please correct any coagulopathy with platelets and FFP for goal of platelets >50K and INR <2.0  - Please notify GI team if there is significant change in patient's clinical status  - To date, patient has required at least 21 units of pRBCs to maintain Hgb >7         Endocrine  Severe protein-calorie malnutrition  Nutrition consulted. Most recent weight and BMI monitored-     Measurements:  Wt Readings from Last 1 Encounters:   07/07/23 54 kg (119 lb)   Body mass index is 21.77 kg/m².    Patient has been screened and assessed by RD.    Malnutrition Type:  Context: social/environmental circumstances  Level: severe    Malnutrition Characteristic Summary:  Energy Intake (Malnutrition): less than or equal to 75% for greater than or equal to 1 month  Subcutaneous Fat (Malnutrition): severe depletion  Muscle Mass (Malnutrition): severe depletion    Interventions/Recommendations (treatment strategy):  1. When medically able, initiate TF regimen of Impact Peptide 1.5 @ goal rate of 45 mL/hr- provides 1620 kcals, 101 g pro, and 832 mL fluid. 2. If extubated and able to tolerate PO intake before next RD f/u, ADAT to regular- texture per SLP. 3. RD following.     Plan  -Initiating tube feeds with above formulation  -NPO at midnight    GI  * Gastrointestinal hemorrhage associated with duodenal ulcer  See above     Hematochezia  - GI EGD clipped duodenal ulcers for IR reference  - Transfuse blood products for active bleeding   - trend CBC and follow  - IR embolized GDA for duodenal hyperemia     Other  Retroperitoneal bleed  -  CT-A demonstrated stable retroperitoneal hematoma. No need for intervention at this time.       Critical Care Daily Checklist:    A: Awake: RASS Goal/Actual Goal: RASS Goal: 0-->alert and calm  Actual:     B: Spontaneous Breathing Trial Performed? Spon. Breathing Trial Initiated?: Not initiated (07/10/23 0738)   C: SAT & SBT Coordinated?  Yes                      D: Delirium: CAM-ICU Overall CAM-ICU: Positive   E: Early Mobility Performed? No   F: Feeding Goal: Goals: Will meet % EEN/EPN by next RD f/u.  Status: Nutrition Goal Status: new   Current Diet Order   Procedures    Diet NPO    Diet NPO      AS: Analgesia/Sedation No sedation   T: Thromboembolic Prophylaxis Holding 2/2 acute blood loss   H: HOB > 300 Yes   U: Stress Ulcer Prophylaxis (if needed) Protonix 40 BID IV   G: Glucose Control SSI   B: Bowel Function Stool Occurrence: 1   I: Indwelling Catheter (Lines & Saldaña) Necessity Central Line, A-line, Saldaña, rectal   D: De-escalation of Antimicrobials/Pharmacotherapies None    Plan for the day/ETD SBT, initiating tube feeds    Code Status:  Family/Goals of Care: DNR         Critical secondary to Patient has a condition that poses threat to life and bodily function: Acute GI Blood Loss      Critical care was time spent personally by me on the following activities: development of treatment plan with patient or surrogate and bedside caregivers, discussions with consultants, evaluation of patient's response to treatment, examination of patient, ordering and performing treatments and interventions, ordering and review of laboratory studies, ordering and review of radiographic studies, pulse oximetry, re-evaluation of patient's condition. This critical care time did not overlap with that of any other provider or involve time for any procedures.     Chevy Montague MD  Critical Care Medicine  [facility] - Cardiac Medical ICU

## 2023-07-10 NOTE — ASSESSMENT & PLAN NOTE
Nutrition consulted. Most recent weight and BMI monitored-     Measurements:  Wt Readings from Last 1 Encounters:   07/07/23 54 kg (119 lb)   Body mass index is 21.77 kg/m².    Patient has been screened and assessed by RD.    Malnutrition Type:  Context: social/environmental circumstances  Level: severe    Malnutrition Characteristic Summary:  Energy Intake (Malnutrition): less than or equal to 75% for greater than or equal to 1 month  Subcutaneous Fat (Malnutrition): severe depletion  Muscle Mass (Malnutrition): severe depletion    Interventions/Recommendations (treatment strategy):  1. When medically able, initiate TF regimen of Impact Peptide 1.5 @ goal rate of 45 mL/hr- provides 1620 kcals, 101 g pro, and 832 mL fluid. 2. If extubated and able to tolerate PO intake before next RD f/u, ADAT to regular- texture per SLP. 3. RD following.     Plan  -Initiating tube feeds with above formulation  -NPO at midnight

## 2023-07-10 NOTE — ANESTHESIA PREPROCEDURE EVALUATION
[facility]  Anesthesia Pre-Operative Evaluation         Patient Name/: Marely Hamilton, 1965  MRN: [id number]    SUBJECTIVE:     Pre-operative evaluation for Procedure(s) (LRB):  EGD (ESOPHAGOGASTRODUODENOSCOPY) (N/A)     07/10/2023    Marely Hamilton is a 58 y.o. female w/ a significant PMHx of seizures, thrombocytopenia, anxiety, alcohol abuse, anemia, GI bleed, and chronic liver failure with hepatic coma . Patient now presents for the above procedure(s).      Patient presented with chief complaint of hematochezia. ED course significant for severe hypotension with associated anemia requiring blood transfusion. Started on pressor infusion and was intubated due to acute hypoxemic respiratory failure.   Of significance, patient had recent admission for large retroperitoneal hemorrhage requiring IR embolization and IVC filter placement.     Patient denies any other known cardiopulmonary disease.       NPO status: Appropriate    Previous Airway:  Indications:  Airway protection  Diagnosis:  Seizure  Patient Location:  ICU  Timeout:  2023 2:19 AM  Procedure Start Time:  2023 2:19 AM  Procedure End Time:  2023 2:23 AM  Staff:     Anesthesiologist Present: No    Intubation:     Induction:  Rapid sequence induction    Intubated:  Postinduction    Mask Ventilation:  Not attempted    Attempts:  1    Attempted By:  Resident anesthesiologist    Method of Intubation:  Video laryngoscopy    Blade:  Buitrago 3    Laryngeal View Grade: Grade I - full view of chords      Difficult Airway Encountered?: No      Complications:  None    Airway Device:  Oral endotracheal tube    Airway Device Size:  7.0    Style/Cuff Inflation:  Cuffed (inflated to minimal occlusive pressure)    Tube secured:  21    Secured at:  The teeth    Placement Verified By:  Colorimetric ETCO2 device    Complicating Factors:  None    Findings Post-Intubation:  BS equal bilateral and atraumatic/condition of teeth  unchanged  Notes:      10mg etomidate and 60mg of rocuronium given for intubation.       ________________________________________  Results for orders placed during the hospital encounter of 05/28/23    Echo    Interpretation Summary  · The left ventricle is normal in size with hyperdynamic systolic function. The estimated ejection fraction is 70%.  · Normal right ventricular size with normal right ventricular systolic function.  · Normal left ventricular diastolic function.  · Mild left atrial enlargement.  · Mechanically ventilated; cannot use inferior caval vein diameter to estimate central venous pressure.  · Trivial pericardial effusion.  · There is a left pleural effusion.    ________________________________________    LDA:   Percutaneous Central Line Insertion/Assessment - Triple Lumen  07/07/23 0232 Femoral Vein Right;Femoral Right (Active)   Line Necessity Review Hemodynamic instability 07/10/23 0705   Verification by X-ray Yes 07/10/23 0705   Site Assessment No drainage;No redness;No swelling;No warmth 07/10/23 1105   Line Securement Device Secured with sutures 07/10/23 0705   Dressing Type Central line dressing;Transparent (Tegaderm) 07/10/23 1105   Dressing Status Clean;Dry;Intact 07/10/23 1105   Dressing Intervention Integrity maintained 07/10/23 1105   Date on Dressing 07/07/23 07/09/23 1901   Dressing Due to be Changed 07/14/23 07/10/23 0300   Distal Patency/Care infusing 07/10/23 1105   Medial 1 Patency/Care normal saline lock 07/10/23 1105   Proximal 1 Patency/Care infusing 07/10/23 1105   Waveform Not being transduced 07/07/23 0701   Number of days: 3            Peripheral IV - Single Lumen 07/06/23 0546 18 G Anterior;Right Upper Arm (Active)   Site Assessment Clean;Dry;No redness;Intact;No swelling 07/10/23 1105   Extremity Assessment Distal to IV No abnormal discoloration;No redness;No swelling;No warmth 07/10/23 1105   Line Status Saline locked;Flushed 07/10/23 1105   Dressing Status  Intact;Dry;Clean 07/10/23 1105   Dressing Intervention Integrity maintained 07/10/23 1105   Dressing Change Due 07/10/23 07/10/23 0300   Site Change Due 07/10/23 07/09/23 1901   Reason Not Rotated Not due 07/10/23 0300   Number of days: 4            Peripheral IV - Single Lumen 07/06/23 2202 18 G Anterior;Left Forearm (Active)   Site Assessment Clean;Intact;Dry 07/10/23 1105   Extremity Assessment Distal to IV No abnormal discoloration;No redness;No swelling;No warmth 07/10/23 1105   Line Status Saline locked 07/10/23 1105   Dressing Status Clean;Dry;Intact 07/10/23 1105   Dressing Intervention Integrity maintained 07/10/23 1105   Dressing Change Due 07/10/23 07/10/23 0300   Site Change Due 07/10/23 07/09/23 1901   Reason Not Rotated Not due 07/10/23 0300   Number of days: 3       Arterial Line 07/07/23 0232 Left Radial (Active)   Site Assessment Clean;Dry;Intact;No redness;No swelling 07/10/23 1105   Line Status Pulsatile blood flow 07/10/23 1105   Art Line Waveform Appropriate 07/10/23 1105   Arterial Line Interventions Zeroed and calibrated;Leveled;Connections checked and tightened;Flushed per protocol 07/10/23 1105   Color/Movement/Sensation Capillary refill greater than 3 sec 07/10/23 1105   Dressing Type Central line dressing;Transparent (Tegaderm) 07/10/23 1105   Dressing Status Clean;Dry;Intact 07/10/23 1105   Dressing Intervention Integrity maintained 07/10/23 1105   Dressing Change Due 07/11/23 07/10/23 0300   Tubing Change Due 07/11/23 07/09/23 1901   Number of days: 3            NG/OG Tube 07/06/23 0323 Center mouth (Active)   Placement Check placement verified by x-ray 07/10/23 1105   Tolerance no signs/symptoms of discomfort 07/10/23 1105   Securement secured to commercial device 07/10/23 0300   Clamp Status/Tolerance clamped 07/10/23 1105   Suction Setting/Drainage Method suction at;low;intermittent setting 07/07/23 0701   Insertion Site Appearance no redness, warmth, tenderness, skin breakdown,  "drainage 07/10/23 0300   Drainage Bloody 07/07/23 0701   Flush/Irrigation flushed w/;water;no resistance met 07/07/23 0701   Intake (mL) 50 mL 07/07/23 0800   Tube Output(mL)(Include Discarded Residual) 50 mL 07/07/23 0950   Number of days: 4            Urethral Catheter 07/05/23 1332 Double-lumen (Active)   Site Assessment Clean;Intact 07/10/23 1105   Collection Container Urimeter 07/10/23 1105   Securement Method secured to top of thigh w/ adhesive device 07/10/23 1105   Catheter Care Performed yes 07/10/23 1105   Reason for Continuing Urinary Catheterization Critically ill in ICU and requiring hourly monitoring of intake/output 07/10/23 1105   CAUTI Prevention Bundle Securement Device in place with 1" slack;Intact seal between catheter & drainage tubing;Drainage bag/urimeter off the floor;No dependent loops or kinks;Sheeting clip in use;Drainage bag/urimeter not overfilled (<2/3 full);Drainage bag/urimeter below bladder 07/10/23 0705   Output (mL) 40 mL 07/10/23 0700   Number of days: 5            Fecal Incontinence  07/09/23 2300 (Active)   $ Fecal Management Supplies Fecal Management System (Supply) 07/09/23 2300   Application fecal incontinence  in place 07/10/23 1105   Drainage Method attached to drainage bag 07/10/23 1105   Securement to gravity 07/10/23 1105   Skin no breakdown 07/10/23 1105   Tolerance no signs/symptoms of discomfort 07/10/23 1105   Stool (mL) 300 mL 07/10/23 0600   Number of days: 0       Drips:    dextrose 5 % and 0.45 % NaCl      NORepinephrine bitartrate-D5W 0.04 mcg/kg/min (07/10/23 0700)       Patient Active Problem List   Diagnosis    Cirrhosis, Laennec's    Thrombocytopenia    History of seizure    Glaucoma    hx of intentional Tylenol overdose    Alcohol abuse, in remission    AYESHA (acute kidney injury)    Macrocytic anemia    Chronic liver failure    Severe protein-calorie malnutrition    Hepatic encephalopathy    Bipolar 1 disorder, depressed, " severe    Elevated LFTs    Bipolar 1 disorder    Bipolar disorder, manic    SVT (supraventricular tachycardia)    Alcoholic cirrhosis    Metabolic acidosis    Hyperammonemia    Hypercalcemia    Acute metabolic encephalopathy    Low body temperature    Hypophosphatemia    Refeeding syndrome    Non-convulsive status epilepticus    Acute liver failure with hepatic coma    Hypotension due to hypovolemia    Deep vein thrombosis (DVT) of femoral vein of right lower extremity    Acute deep vein thrombosis (DVT) of brachial vein of right upper extremity    Retroperitoneal bleed    Coagulopathy    Atelectasis    Internal jugular (IJ) vein thromboembolism, acute, right    Abrasion of nose    Acute blood loss anemia    Hypotension    Hematochezia    Gastrointestinal hemorrhage associated with duodenal ulcer       Review of patient's allergies indicates:   Allergen Reactions    Sulfa (sulfonamide antibiotics) Rash    Codeine Nausea And Vomiting       Current Inpatient Medications:    cefTRIAXone (ROCEPHIN) IVPB  1 g Intravenous Q24H    levETIRAcetam (Keppra) IV (PEDS and ADULTS)  1,000 mg Intravenous Q12H    mupirocin   Nasal BID    pantoprazole  40 mg Intravenous BID       No current facility-administered medications on file prior to encounter.     Current Outpatient Medications on File Prior to Encounter   Medication Sig Dispense Refill    ARIPiprazole (ABILIFY) 20 MG Tab Take 5 mg by mouth once daily.      folic acid (FOLVITE) 1 MG tablet Take 1 tablet (1 mg total) by mouth once daily. (Patient taking differently: Take 1 mg by mouth every evening.) 30 tablet 2    furosemide (LASIX) 20 MG tablet Take 20 mg by mouth once daily.      lactulose (CHRONULAC) 10 gram/15 mL solution Take 10 g by mouth 3 (three) times daily.      lithium carbonate 150 MG capsule Take 1 capsule (150 mg total) by mouth every evening. (Patient taking differently: Take 150 mg by mouth 2 (two) times a day.) 30 capsule  11    magnesium oxide (MAG-OX) 400 mg (241.3 mg magnesium) tablet Take by mouth once daily.      pantoprazole (PROTONIX) 40 MG tablet Take 40 mg by mouth once daily.      propranoloL (INDERAL) 40 MG tablet Take 40 mg by mouth once daily.      QUEtiapine (SEROQUEL) 300 MG Tab Take 100 mg by mouth every evening.      sodium bicarbonate 650 MG tablet Take 650 mg by mouth Daily.      spironolactone (ALDACTONE) 50 MG tablet Take 1 tablet (50 mg total) by mouth once daily. 90 tablet 3    zonisamide (ZONEGRAN) 100 MG Cap Take 100 mg by mouth once daily.      levETIRAcetam (KEPPRA) 1000 MG tablet Take 1,000 mg by mouth 2 (two) times daily.      OLANZapine (ZYPREXA) 10 MG tablet Take 1 tablet (10 mg total) by mouth every evening. (Patient not taking: Reported on 7/6/2023) 30 tablet 11    rifAXIMin (XIFAXAN) 550 mg Tab Take 1 tablet (550 mg total) by mouth 2 (two) times daily. (Patient not taking: Reported on 7/6/2023) 180 tablet 3       Past Surgical History:   Procedure Laterality Date    COSMETIC SURGERY      EMBOLIZATION N/A 6/11/2023    Procedure: EMBOLIZATION, BLOOD VESSEL;  Surgeon: Debbie Surgeon;  Location: [facility];  Service: Anesthesiology;  Laterality: N/A;    ESOPHAGOGASTRODUODENOSCOPY      ESOPHAGOGASTRODUODENOSCOPY N/A 7/6/2023    Procedure: EGD (ESOPHAGOGASTRODUODENOSCOPY);  Surgeon: Bernice Guillen MD;  Location: [address];  Service: Endoscopy;  Laterality: N/A;       Social History:  Tobacco Use: Low Risk     Smoking Tobacco Use: Never    Smokeless Tobacco Use: Never    Passive Exposure: Not on file       Alcohol Use: Unknown    Frequency of Alcohol Consumption: Patient refused    Average Number of Drinks: Patient refused    Frequency of Binge Drinking: Patient refused       OBJECTIVE:     Vital Signs Range:  BMI Readings from Last 1 Encounters:   07/10/23 21.77 kg/m²       Temp:  [36.3 °C (97.4 °F)-36.7 °C (98 °F)]   Pulse:  [58-72]   Resp:  [10-21]   BP: (103-111)/(55-59)    SpO2:  [97 %-100 %]   Arterial Line BP: (115-132)/(44-51)        Significant Labs:        Component Value Date/Time    WBC 3.54 (L) 07/10/2023 0826    HGB 7.6 (L) 07/10/2023 0826    HCT 22.8 (L) 07/10/2023 0826    HCT 21 (L) 07/06/2023 0024    PLT 48 (L) 07/10/2023 0826     (H) 07/10/2023 0358    K 3.8 07/10/2023 0358     (H) 07/10/2023 0358    CO2 22 (L) 07/10/2023 0358     (H) 07/10/2023 0358    BUN 17 07/10/2023 0358    CREATININE 0.5 07/10/2023 0358    MG 2.1 07/10/2023 0358    PHOS 2.9 07/10/2023 0358    CALCIUM 7.9 (L) 07/10/2023 0358    ALBUMIN 2.8 (L) 07/10/2023 0358    PROT 4.3 (L) 07/10/2023 0358    ALKPHOS 54 (L) 07/10/2023 0358    BILITOT 3.3 (H) 07/10/2023 0358    AST 23 07/10/2023 0358    ALT 10 07/10/2023 0358    INR 1.6 (H) 07/10/2023 0826    HGBA1C <4.0 (A) 05/29/2023 0656        Please see Results Review for additional labs.     Diagnostic Studies:   CTA Acute GI [place], Abdomen and Pelvis  Narrative: EXAMINATION:  CTA ACUTE GI BLEED, ABDOMEN AND PELVIS    CLINICAL HISTORY:  GI bleed;gi bleed;    TECHNIQUE:  Low dose axial images, sagittal and coronal reformations were obtained from the lung bases to the pubic symphysis following the IV administration of 75 mL of Omnipaque 350 .  Oral contrast was not administered.    COMPARISON:  CTA abdomen pelvis 07/07/2023, 07/05/2023    FINDINGS:  Heart: Enlarged.  Low attenuating blood pool when compared to the myocardium, which can indicate decreased hematocrit.  No significant effusion.  Reflux of contrast into the hepatic IVC, which can indicate right heart or valvular disease.    Lung Bases: Small right and trace left pleural effusions.  Associated atelectatic changes in the lung bases.  No consolidation.    Liver: Nodular contour which may be seen in cirrhosis.  No focal lesions.    Gallbladder: Contracted with multiple calcified stones.    Bile Ducts: No evidence of dilated ducts.    Pancreas: No mass or peripancreatic fat  stranding.    Spleen: Upper limit of normal in size/mildly enlarged.  Unremarkable attenuation.    Adrenals: Unremarkable. No focal lesions.    Kidneys/ Ureters: Similar patchy areas of cortical hypoenhancement involving the superior pole of the right kidney.  Normal size and location.  Bilateral renal hypodensities which are too small to adequately characterize.  Additional punctate bilateral renal cortical hyperdensities which may reflect nonobstructive nephroliths or parenchymal calcifications.  No hydroureteronephrosis.    Bladder: Decompressed over a Saldaña catheter with wall thickening.    Reproductive organs: The uterus and adnexa are unremarkable.    GI Tract/Mesentery: Enteric tip ending in the gastric lumen.  A surgical clip is again noted in the region of the duodenum.  Para duodenal vascular coils are new from prior examination.  There is no evidence of bowel obstruction.  Rectal catheter is in place with a moderate amount of fluid within the rectum.  The appendix appears within normal limits.  No evidence of contrast extravasation within the gastrointestinal tract to suggest active bleeding, allowing for artifact limitations related to embolization coils.  Trace active bleeding would be difficult to entirely exclude and therefore endoscopic correlation may be more sensitive.  No pneumatosis or portal venous gas.    Peritoneal Space: Small volume abdominopelvic ascites.  No free air.    Retroperitoneum: No significant adenopathy.  Similar sized left retroperitoneal hematoma measuring 11 x 9 cm (series 5, image 100; previously 11 x 9 cm).  The left iliacus collection also measures similar to prior at 5.4 cm (series 5, image 127; previously 5.6 cm).  Layering hyperdensity within these collections suggesting different ages in blood products.  No significant volume active extravasation into these collections.  Evaluation is somewhat limited related to significant streak artifact from the patient's arms  overlying the field of view.  No new retroperitoneal collections.    Abdominal wall: Diffuse anasarca, worse from prior.    Vasculature: The aorta is normal caliber without evidence of significant calcific atherosclerosis.  Right femoral vein catheter and IVC filter are in stable position.  Large collateral vessels in the right peritoneum and retroperitoneum are again noted.    Bones: Postoperative changes of total left hip arthroplasty.  No acute fracture or osseous destructive lesions.  Impression: Interval gastroduodenal artery coil embolization.  Evaluation of the duodenum is somewhat limited secondary to beam hardening artifact however there is no definite evidence of active contrast extravasation to suggest active bleeding.    Stable large left retroperitoneal hematoma and smaller left iliacus hematoma.  No new retroperitoneal hematoma.    Cardiomegaly and reflux of contrast into the hepatic IVC, which can indicate right heart or valvular disease.    Cirrhotic liver morphology with stigmata of portal hypertension including splenomegaly, small volume ascites, and collateral vessels.    Severe anasarca, similar but worse from prior.    Small bilateral pleural effusions.    Additional stable findings above.    Electronically signed by resident: Amari Shabazz  Date:    07/10/2023  Time:    06:17    Electronically signed by: Marvin Urban MD  Date:    07/10/2023  Time:    07:13        EKG:   Results for orders placed or performed during the hospital encounter of 07/05/23   EKG 12-lead    Collection Time: 07/05/23 12:49 PM    Narrative    Test Reason : E87.5,    Vent. Rate : 103 BPM     Atrial Rate : 103 BPM     P-R Int : 132 ms          QRS Dur : 076 ms      QT Int : 342 ms       P-R-T Axes : 073 054 084 degrees     QTc Int : 448 ms    Age and gender specific analysis  Sinus tachycardia  Low voltage QRS  Low anterior forces and R wave progression  Possibly acute STEMI    ACUTE MI / STEMI    Abnormal ECG  When  compared with ECG of 05-JUL-2023 11:07,  No significant change was found  Confirmed by Abimael CLEMENTS MD ([id number]) on 7/5/2023 1:55:55 PM    Referred By: GLORIA   SELF           Confirmed By:Abimael CLEMENTS MD       ECHO:   The left ventricle is normal in size with hyperdynamic systolic function. The estimated ejection fraction is 70%.   Normal right ventricular size with normal right ventricular systolic function.   Normal left ventricular diastolic function.   Mild left atrial enlargement.   Mechanically ventilated; cannot use inferior caval vein diameter to estimate central venous pressure.   Trivial pericardial effusion.   There is a left pleural effusion.  .      ASSESSMENT/PLAN:           Pre-op Assessment    I have reviewed the Patient Summary Reports.     I have reviewed the Nursing Notes. I have reviewed the NPO Status.   I have reviewed the Medications.     Review of Systems  Anesthesia Hx:  No problems with previous Anesthesia  Denies Family Hx of Anesthesia complications.   Denies Personal Hx of Anesthesia complications.   Social:  Non-Smoker    Hematology/Oncology:         -- Anemia:   EENT/Dental:   glaucoma   Cardiovascular:   Denies CAD.     Denies CHF. Hx SVT, DVT   Pulmonary:   Denies COPD.  Denies Asthma.    Renal/:   Denies Chronic Renal Disease.     Hepatic/GI:   Denies GERD. Liver Disease,    Neurological:   Denies CVA. Seizures    Endocrine:   Denies Diabetes.    Psych:   Psychiatric History depression          Physical Exam  General: Alert    Airway:  Mallampati: unable to assess   Pre-Existing Airway: Oral Endotracheal tube    Chest/Lungs:Intubated and mechanically ventilated       Anesthesia Plan  Type of Anesthesia, risks & benefits discussed:    Anesthesia Type: Gen ETT  Intra-op Monitoring Plan: Standard ASA Monitors  Post Op Pain Control Plan: multimodal analgesia  Induction:  IV  Informed Consent: Informed consent signed with the Patient and all parties understand the risks and agree  with anesthesia plan.  All questions answered.   ASA Score: 3  Day of Surgery Review of History & Physical: H&P Update referred to the surgeon/provider.    Ready For Surgery From Anesthesia Perspective.     .

## 2023-07-10 NOTE — SUBJECTIVE & OBJECTIVE
Subjective:     Interval History: Followed up with patient s/p EGD 7/6 with duodenal ulcers noted with small ooze. Clip placed. Continued bleeding and CTA completed. Empiric embolization placed at the location of the clips. Patient has continued to have decreasing blood counts requiring transfusion and pressor requirements. Received 1U pRBC and 1 platelet overnight. Repeat CTA done this AM without extravasation. BMS in place with dark/maroon output noted. Still requiring low dose Levophed. Follows commands.    Review of Systems   Unable to perform ROS: Intubated   Objective:     Vital Signs (Most Recent):  Temp: 98 °F (36.7 °C) (07/10/23 1200)  Pulse: 64 (07/10/23 1524)  Resp: 18 (07/10/23 1524)  BP: (!) 104/57 (07/10/23 1300)  SpO2: 100 % (07/10/23 1524) Vital Signs (24h Range):  Temp:  [97.4 °F (36.3 °C)-98 °F (36.7 °C)] 98 °F (36.7 °C)  Pulse:  [58-74] 64  Resp:  [10-50] 18  SpO2:  [97 %-100 %] 100 %  BP: (103-130)/(55-61) 104/57  Arterial Line BP: (114-139)/(38-53) 119/44     Weight: 54 kg (119 lb) (07/07/23 0400)  Body mass index is 21.77 kg/m².      Intake/Output Summary (Last 24 hours) at 7/10/2023 1556  Last data filed at 7/10/2023 0700  Gross per 24 hour   Intake 1720.96 ml   Output 1025 ml   Net 695.96 ml         Lines/Drains/Airways       Central Venous Catheter Line  Duration             Percutaneous Central Line Insertion/Assessment - Triple Lumen  07/07/23 0232 Femoral Vein Right;Femoral Right 3 days              Drain  Duration                  Urethral Catheter 07/05/23 1332 Double-lumen 5 days         NG/OG Tube 07/06/23 0323 Center mouth 4 days         Fecal Incontinence  07/09/23 2300 <1 day              Airway  Duration                  Airway - Non-Surgical 07/05/23 1220 Endotracheal Tube 5 days              Arterial Line  Duration             Arterial Line 07/07/23 0232 Left Radial 3 days              Peripheral Intravenous Line  Duration                  Peripheral IV - Single Lumen  07/06/23 0546 18 G Anterior;Right Upper Arm 4 days         Peripheral IV - Single Lumen 07/06/23 2202 18 G Anterior;Left Forearm 3 days                     Physical Exam  Vitals and nursing note reviewed.   Constitutional:       Appearance: She is ill-appearing.   HENT:      Mouth/Throat:      Mouth: Mucous membranes are dry.      Comments: OG in place with bloody contents  Eyes:      General: Scleral icterus present.   Abdominal:      General: Abdomen is flat. Bowel sounds are normal. There is no distension.      Palpations: Abdomen is soft. There is no mass.      Tenderness: There is no abdominal tenderness.      Hernia: No hernia is present.   Musculoskeletal:         General: Swelling present.   Skin:     General: Skin is warm.      Coloration: Skin is jaundiced and pale.      Findings: Bruising present. No erythema.   Neurological:      General: No focal deficit present.      Comments: Intubated, sedated        Significant Labs:  CBC:   Recent Labs   Lab 07/09/23  2047 07/10/23  0358 07/10/23  0826   WBC 4.09 4.17  4.17 3.54*   HGB 6.3* 7.7*  7.7* 7.6*   HCT 19.3* 23.4*  23.4* 22.8*   PLT 43* 52*  52* 48*       CMP:   Recent Labs   Lab 07/10/23  0358   *   CALCIUM 7.9*   ALBUMIN 2.8*   PROT 4.3*   *   K 3.8   CO2 22*   *   BUN 17   CREATININE 0.5   ALKPHOS 54*   ALT 10   AST 23   BILITOT 3.3*       Coagulation:   Recent Labs   Lab 07/10/23  0358 07/10/23  0826   INR  --  1.6*   APTT 28.3  --            Significant Imaging:  Imaging results within the past 24 hours have been reviewed.    EGD 7/6/2023  Impression:            - Esophageal ulcer with no bleeding and no                          stigmata of recent bleeding.                          - No esophageal varices.                          - Non bleeding gastric ulcer likely due to OG tube                          trauma.                          - Non-bleeding duodenal ulcers with no stigmata of                          bleeding. Small  amount of oozing noted. Clip was                          placed for marking.                          - No specimens collected.   Recommendation:        - Return patient to ICU for ongoing care.                          - NPO.                          -Continue high dose IV PPI.                          - If significant overt GI bleeding occurs then                          consider STAT CTA abdomen and pelvis with bleeding                          protocol and IR consult for possible embolization                          vs empiric embolization by IR targeting the clip                          that was placed adjacent to the duodenal ulcers.   Attending Participation:        I personally performed the entire procedure.   Bernice Guillen MD   7/6/2023 7:52:51 PM     CTA GI Bleed 7/10/2023  Impression:     Interval gastroduodenal artery coil embolization.  Evaluation of the duodenum is somewhat limited secondary to beam hardening artifact however there is no definite evidence of active contrast extravasation to suggest active bleeding.     Stable large left retroperitoneal hematoma and smaller left iliacus hematoma.  No new retroperitoneal hematoma.     Cardiomegaly and reflux of contrast into the hepatic IVC, which can indicate right heart or valvular disease.     Cirrhotic liver morphology with stigmata of portal hypertension including splenomegaly, small volume ascites, and collateral vessels.     Severe anasarca, similar but worse from prior.     Small bilateral pleural effusions.     Additional stable findings above.     Electronically signed by resident: Amari Shabazz  Date:                                            07/10/2023  Time:                                           06:17

## 2023-07-10 NOTE — ASSESSMENT & PLAN NOTE
- Maintain IV access with 2 large bore Ivs  - Intravascular resuscitation/support with IVFs   - Hold all NSAIDs and anticoagulants, unless contraindicated.  - Please correct any coagulopathy with platelets and FFP for goal of platelets >50K and INR <2.0  - Please notify GI team if there is significant change in patient's clinical status  - To date, patient has required at least 21 units of pRBCs to maintain Hgb >7

## 2023-07-10 NOTE — SUBJECTIVE & OBJECTIVE
Interval History/Significant Events: Patient required one unit of pRBCs and 2 units of platelets overnight. Hgb 6.3 -> 7.7. Reacting to commands off sedation. SBT failed yesterday. Melena continues.    Review of Systems   Unable to perform ROS: Patient unresponsive   Objective:     Vital Signs (Most Recent):  Temp: 98 °F (36.7 °C) (07/10/23 0715)  Pulse: 64 (07/10/23 1144)  Resp: 10 (07/10/23 1144)  BP: 130/61 (07/09/23 1600)  SpO2: 100 % (07/10/23 1144) Vital Signs (24h Range):  Temp:  [97.4 °F (36.3 °C)-98 °F (36.7 °C)] 98 °F (36.7 °C)  Pulse:  [58-74] 64  Resp:  [10-52] 10  SpO2:  [97 %-100 %] 100 %  BP: (130-140)/(61-65) 130/61  Arterial Line BP: (112-139)/(38-53) 119/44   Weight: 54 kg (119 lb)  Body mass index is 21.77 kg/m².      Intake/Output Summary (Last 24 hours) at 7/10/2023 1305  Last data filed at 7/10/2023 0700  Gross per 24 hour   Intake 2521.61 ml   Output 1320 ml   Net 1201.61 ml            Physical Exam  Constitutional:       Appearance: She is ill-appearing and toxic-appearing.      Interventions: She is intubated.   HENT:      Head: Normocephalic.   Eyes:      General: Scleral icterus present.   Neck:      Thyroid: No thyroid mass or thyroid tenderness.      Vascular: No carotid bruit or hepatojugular reflux.   Cardiovascular:      Rate and Rhythm: Normal rate.      Pulses: Decreased pulses.      Heart sounds: Heart sounds are distant.   Pulmonary:      Effort: She is intubated.   Chest:      Chest wall: No mass, lacerations or deformity.   Breasts:     Right: No swelling.      Left: No swelling.   Abdominal:      General: Abdomen is flat.      Palpations: Abdomen is rigid.      Hernia: No hernia is present.   Genitourinary:     Vagina: Normal.   Musculoskeletal:      Cervical back: Rigidity present.   Lymphadenopathy:      Cervical: No cervical adenopathy.   Skin:     Coloration: Skin is jaundiced.      Findings: Ecchymosis present.   Neurological:      Mental Status: She is unresponsive.           Vents:  Vent Mode: Spont (07/10/23 1144)  Ventilator Initiated: Yes (07/05/23 1221)  Set Rate: 18 BPM (07/10/23 0738)  Vt Set: 300 mL (07/10/23 0738)  Pressure Support: 5 cmH20 (07/10/23 1144)  PEEP/CPAP: 5 cmH20 (07/10/23 1144)  Oxygen Concentration (%): 30 (07/10/23 1144)  Peak Airway Pressure: 10 cmH20 (07/10/23 1144)  Plateau Pressure: 0 cmH20 (07/10/23 1144)  Total Ve: 4.9 L/m (07/10/23 1144)  Negative Inspiratory Force (cm H2O): 0 (07/10/23 1144)  F/VT Ratio<105 (RSBI): (!) 24.81 (07/10/23 1144)  Lines/Drains/Airways       Central Venous Catheter Line  Duration             Percutaneous Central Line Insertion/Assessment - Triple Lumen  07/07/23 0232 Femoral Vein Right;Femoral Right 3 days              Drain  Duration                  NG/OG Tube 07/06/23 0323 Center mouth 4 days         Urethral Catheter 07/05/23 1332 Double-lumen 4 days         Fecal Incontinence  07/09/23 2300 <1 day              Airway  Duration                  Airway - Non-Surgical 07/05/23 1220 Endotracheal Tube 5 days              Arterial Line  Duration             Arterial Line 07/07/23 0232 Left Radial 3 days              Peripheral Intravenous Line  Duration                  Peripheral IV - Single Lumen 07/06/23 0546 18 G Anterior;Right Upper Arm 4 days         Peripheral IV - Single Lumen 07/06/23 2202 18 G Anterior;Left Forearm 3 days                  Significant Labs:    CBC/Anemia Profile:  Recent Labs   Lab 07/09/23  2047 07/10/23  0358 07/10/23  0826   WBC 4.09 4.17  4.17 3.54*   HGB 6.3* 7.7*  7.7* 7.6*   HCT 19.3* 23.4*  23.4* 22.8*   PLT 43* 52*  52* 48*   MCV 95 94  94 93   RDW 20.9* 18.9*  18.9* 19.0*          Chemistries:  Recent Labs   Lab 07/08/23  1559 07/09/23  0440 07/09/23  1638 07/10/23  0358   * 149* 151* 150*   K 3.9 3.9 3.5 3.8   * 121* 122* 120*   CO2 24 23 22* 22*   BUN 20 21* 20 17   CREATININE 0.4* 0.5 0.5 0.5   CALCIUM 7.6* 7.7* 8.0* 7.9*   ALBUMIN 2.0* 2.1* 2.9* 2.8*   PROT  3.8* 4.0* 4.2* 4.3*   BILITOT 3.1* 3.5* 3.5* 3.3*   ALKPHOS 54* 62 48* 54*   ALT 12 14 10 10   AST 27 32 23 23   MG 2.0 2.0  --  2.1   PHOS 2.8 2.7 2.5* 2.9         All pertinent labs within the past 24 hours have been reviewed.    Significant Imaging:  I have reviewed all pertinent imaging results/findings within the past 24 hours.

## 2023-07-10 NOTE — PLAN OF CARE
[facility] DAILY GOALS       A: Awake    RASS: Goal - RASS Goal: 0-->alert and calm  Actual - RASS (Pepper Agitation-Sedation Scale): drowsy   Restraint necessity: Clinical Justification: Removing medical devices  B: Breathe   SBT: Not attempted   C: Coordinate A & B, analgesics/sedatives   Pain: managed    SAT: Pass  D: Delirium   CAM-ICU: Overall CAM-ICU: Positive  E: Early(intubated/ Progressive (non-intubated) Mobility   MOVE Screen: Fail   Activity: Activity Management: Rolling - L1  FAS: Feeding/Nutrition   Diet order: Diet/Nutrition Received: NPO,    T: Thrombus   DVT prophylaxis: VTE Required Core Measure: Per order contraindicated for SCDs/Anticoagulants  H: HOB Elevation   Head of Bed (HOB) Positioning: HOB at 30-45 degrees  U: Ulcer Prophylaxis   GI: yes  G: Glucose control   managed    S: Skin   Bathing/Skin Care: bath, complete  Device Skin Pressure Protection: absorbent pad utilized/changed, adhesive use limited, skin-to-skin areas padded, skin-to-device areas padded, pressure points protected  Pressure Reduction Devices: pressure-redistributing mattress utilized, foam padding utilized  Pressure Reduction Techniques: weight shift assistance provided, pressure points protected  Skin Protection: adhesive use limited, incontinence pads utilized, skin-to-device areas padded, skin-to-skin areas padded, tubing/devices free from skin contact  B: Bowel Function   diarrhea   I: Indwelling Catheters   [place] necessity:      Urethral Catheter 07/05/23 1332 Double-lumen-Reason for Continuing Urinary Catheterization: Critically ill in ICU and requiring hourly monitoring of intake/output   CVC necessity: Yes  D: De-escalation Antibiotics   Yes    Family/Goals of care/Code Status   Code Status: DNR    24H Vital Sign Range  Temp:  [97.4 °F (36.3 °C)-98.1 °F (36.7 °C)]   Pulse:  [58-85]   Resp:  [7-52]   BP: (130-142)/(61-65)   SpO2:  [97 %-100 %]   Arterial Line BP: (112-139)/(38-57)      Shift Events    1 unit PRBC's and  1 unit platelets given. Bloody BM's throughout night. CT abdomen/pelvis completed. Current gtts: levo and D5W.     VS and assessment per flow sheet, patient progressing towards goals as tolerated, plan of care reviewed with patient, all concerns addressed, will continue to monitor.    Lala Lantigua

## 2023-07-10 NOTE — ASSESSMENT & PLAN NOTE
58 F with alcoholic cirrhosis admitted with hemorrhagic shock. EGD 7/06 with oozing duodenal ulcers but nothing treatable, clip placed. Developed worsening hemodynamics and overt bleeding leading to transfusion of multiple blood products. Ultimately empirically embolized with IR on 7/07 near location of clip but no active bleeding seen on CTA. She remains intubated with ongoing dark bowel movements. Received 1U pRBC overnight, 1 platelet. CTA negative this AM.     Recommendations:  - NPO after midnight for possible EGD tomorrow.   - CBC every 8 hours or as clinically indicated  - Continue IV PPI 40 mg BID  - Correct coagulopathy for INR <2.0 and Platelets >50K  - Please maintain 2 large bore IV's (18g or larger).   - Please hold all NSAIDs and anticoagulants.

## 2023-07-11 ENCOUNTER — ANESTHESIA (OUTPATIENT)
Dept: ENDOSCOPY | Facility: HOSPITAL | Age: 58
DRG: 356 | End: 2023-07-11
Payer: MEDICARE

## 2023-07-11 VITALS — OXYGEN SATURATION: 95 % | HEART RATE: 76 BPM | RESPIRATION RATE: 18 BRPM

## 2023-07-11 LAB
ALBUMIN SERPL BCP-MCNC: 2.6 G/DL (ref 3.5–5.2)
ALBUMIN SERPL BCP-MCNC: 2.6 G/DL (ref 3.5–5.2)
ALP SERPL-CCNC: 64 U/L (ref 55–135)
ALP SERPL-CCNC: 65 U/L (ref 55–135)
ALT SERPL W/O P-5'-P-CCNC: 10 U/L (ref 10–44)
ALT SERPL W/O P-5'-P-CCNC: 11 U/L (ref 10–44)
ANION GAP SERPL CALC-SCNC: 4 MMOL/L (ref 8–16)
ANION GAP SERPL CALC-SCNC: 7 MMOL/L (ref 8–16)
APTT PPP: 31.2 SEC (ref 21–32)
AST SERPL-CCNC: 25 U/L (ref 10–40)
AST SERPL-CCNC: 27 U/L (ref 10–40)
BASOPHILS # BLD AUTO: 0.02 K/UL (ref 0–0.2)
BASOPHILS # BLD AUTO: 0.02 K/UL (ref 0–0.2)
BASOPHILS NFR BLD: 0.6 % (ref 0–1.9)
BASOPHILS NFR BLD: 0.6 % (ref 0–1.9)
BILIRUB SERPL-MCNC: 2.7 MG/DL (ref 0.1–1)
BILIRUB SERPL-MCNC: 2.7 MG/DL (ref 0.1–1)
BUN SERPL-MCNC: 10 MG/DL (ref 6–20)
BUN SERPL-MCNC: 8 MG/DL (ref 6–20)
CA-I BLDV-SCNC: 1.16 MMOL/L (ref 1.06–1.42)
CA-I BLDV-SCNC: 1.19 MMOL/L (ref 1.06–1.42)
CALCIUM SERPL-MCNC: 7.7 MG/DL (ref 8.7–10.5)
CALCIUM SERPL-MCNC: 7.9 MG/DL (ref 8.7–10.5)
CHLORIDE SERPL-SCNC: 117 MMOL/L (ref 95–110)
CHLORIDE SERPL-SCNC: 118 MMOL/L (ref 95–110)
CO2 SERPL-SCNC: 21 MMOL/L (ref 23–29)
CO2 SERPL-SCNC: 22 MMOL/L (ref 23–29)
CREAT SERPL-MCNC: 0.4 MG/DL (ref 0.5–1.4)
CREAT SERPL-MCNC: 0.4 MG/DL (ref 0.5–1.4)
DIFFERENTIAL METHOD: ABNORMAL
DIFFERENTIAL METHOD: ABNORMAL
EOSINOPHIL # BLD AUTO: 0.1 K/UL (ref 0–0.5)
EOSINOPHIL # BLD AUTO: 0.1 K/UL (ref 0–0.5)
EOSINOPHIL NFR BLD: 2.8 % (ref 0–8)
EOSINOPHIL NFR BLD: 2.9 % (ref 0–8)
ERYTHROCYTE [DISTWIDTH] IN BLOOD BY AUTOMATED COUNT: 21.5 % (ref 11.5–14.5)
ERYTHROCYTE [DISTWIDTH] IN BLOOD BY AUTOMATED COUNT: 22.1 % (ref 11.5–14.5)
EST. GFR  (NO RACE VARIABLE): >60 ML/MIN/1.73 M^2
EST. GFR  (NO RACE VARIABLE): >60 ML/MIN/1.73 M^2
GLUCOSE SERPL-MCNC: 121 MG/DL (ref 70–110)
GLUCOSE SERPL-MCNC: 81 MG/DL (ref 70–110)
HCT VFR BLD AUTO: 25 % (ref 37–48.5)
HCT VFR BLD AUTO: 25.5 % (ref 37–48.5)
HGB BLD-MCNC: 8.2 G/DL (ref 12–16)
HGB BLD-MCNC: 8.3 G/DL (ref 12–16)
IMM GRANULOCYTES # BLD AUTO: 0.01 K/UL (ref 0–0.04)
IMM GRANULOCYTES # BLD AUTO: 0.03 K/UL (ref 0–0.04)
IMM GRANULOCYTES NFR BLD AUTO: 0.3 % (ref 0–0.5)
IMM GRANULOCYTES NFR BLD AUTO: 0.8 % (ref 0–0.5)
INR PPP: 1.5 (ref 0.8–1.2)
INR PPP: 1.5 (ref 0.8–1.2)
LYMPHOCYTES # BLD AUTO: 0.7 K/UL (ref 1–4.8)
LYMPHOCYTES # BLD AUTO: 0.8 K/UL (ref 1–4.8)
LYMPHOCYTES NFR BLD: 18.8 % (ref 18–48)
LYMPHOCYTES NFR BLD: 24.5 % (ref 18–48)
MAGNESIUM SERPL-MCNC: 1.9 MG/DL (ref 1.6–2.6)
MCH RBC QN AUTO: 30.8 PG (ref 27–31)
MCH RBC QN AUTO: 31.6 PG (ref 27–31)
MCHC RBC AUTO-ENTMCNC: 32.2 G/DL (ref 32–36)
MCHC RBC AUTO-ENTMCNC: 33.2 G/DL (ref 32–36)
MCV RBC AUTO: 95 FL (ref 82–98)
MCV RBC AUTO: 96 FL (ref 82–98)
MONOCYTES # BLD AUTO: 0.3 K/UL (ref 0.3–1)
MONOCYTES # BLD AUTO: 0.3 K/UL (ref 0.3–1)
MONOCYTES NFR BLD: 10.5 % (ref 4–15)
MONOCYTES NFR BLD: 9.1 % (ref 4–15)
NEUTROPHILS # BLD AUTO: 1.9 K/UL (ref 1.8–7.7)
NEUTROPHILS # BLD AUTO: 2.5 K/UL (ref 1.8–7.7)
NEUTROPHILS NFR BLD: 61.2 % (ref 38–73)
NEUTROPHILS NFR BLD: 67.9 % (ref 38–73)
NRBC BLD-RTO: 0 /100 WBC
NRBC BLD-RTO: 0 /100 WBC
PHOSPHATE SERPL-MCNC: 2.5 MG/DL (ref 2.7–4.5)
PHOSPHATE SERPL-MCNC: 3.2 MG/DL (ref 2.7–4.5)
PLATELET # BLD AUTO: 60 K/UL (ref 150–450)
PLATELET # BLD AUTO: 67 K/UL (ref 150–450)
PMV BLD AUTO: 10.3 FL (ref 9.2–12.9)
PMV BLD AUTO: 9.9 FL (ref 9.2–12.9)
POCT GLUCOSE: 111 MG/DL (ref 70–110)
POCT GLUCOSE: 138 MG/DL (ref 70–110)
POCT GLUCOSE: 94 MG/DL (ref 70–110)
POTASSIUM SERPL-SCNC: 3.7 MMOL/L (ref 3.5–5.1)
POTASSIUM SERPL-SCNC: 3.9 MMOL/L (ref 3.5–5.1)
PROT SERPL-MCNC: 4.2 G/DL (ref 6–8.4)
PROT SERPL-MCNC: 4.4 G/DL (ref 6–8.4)
PROTHROMBIN TIME: 15.6 SEC (ref 9–12.5)
PROTHROMBIN TIME: 16.1 SEC (ref 9–12.5)
RBC # BLD AUTO: 2.63 M/UL (ref 4–5.4)
RBC # BLD AUTO: 2.66 M/UL (ref 4–5.4)
SODIUM SERPL-SCNC: 143 MMOL/L (ref 136–145)
SODIUM SERPL-SCNC: 146 MMOL/L (ref 136–145)
WBC # BLD AUTO: 3.14 K/UL (ref 3.9–12.7)
WBC # BLD AUTO: 3.62 K/UL (ref 3.9–12.7)

## 2023-07-11 PROCEDURE — 94003 VENT MGMT INPAT SUBQ DAY: CPT

## 2023-07-11 PROCEDURE — 20000000 HC ICU ROOM

## 2023-07-11 PROCEDURE — 99232 SBSQ HOSP IP/OBS MODERATE 35: CPT | Mod: ,,,

## 2023-07-11 PROCEDURE — 80053 COMPREHEN METABOLIC PANEL: CPT

## 2023-07-11 PROCEDURE — 37000009 HC ANESTHESIA EA ADD 15 MINS: Performed by: INTERNAL MEDICINE

## 2023-07-11 PROCEDURE — 82330 ASSAY OF CALCIUM: CPT | Mod: 91

## 2023-07-11 PROCEDURE — 43235 EGD DIAGNOSTIC BRUSH WASH: CPT | Mod: GC,,, | Performed by: INTERNAL MEDICINE

## 2023-07-11 PROCEDURE — 63600175 PHARM REV CODE 636 W HCPCS: Performed by: NURSE ANESTHETIST, CERTIFIED REGISTERED

## 2023-07-11 PROCEDURE — 25000003 PHARM REV CODE 250

## 2023-07-11 PROCEDURE — 43235 EGD DIAGNOSTIC BRUSH WASH: CPT | Performed by: INTERNAL MEDICINE

## 2023-07-11 PROCEDURE — 85025 COMPLETE CBC W/AUTO DIFF WBC: CPT | Mod: 91

## 2023-07-11 PROCEDURE — 99900026 HC AIRWAY MAINTENANCE (STAT)

## 2023-07-11 PROCEDURE — 83735 ASSAY OF MAGNESIUM: CPT

## 2023-07-11 PROCEDURE — 25000003 PHARM REV CODE 250: Performed by: NURSE ANESTHETIST, CERTIFIED REGISTERED

## 2023-07-11 PROCEDURE — D9220A PRA ANESTHESIA: Mod: ANES,,, | Performed by: ANESTHESIOLOGY

## 2023-07-11 PROCEDURE — 85610 PROTHROMBIN TIME: CPT

## 2023-07-11 PROCEDURE — 63600175 PHARM REV CODE 636 W HCPCS

## 2023-07-11 PROCEDURE — 99232 PR SUBSEQUENT HOSPITAL CARE,LEVL II: ICD-10-PCS | Mod: ,,,

## 2023-07-11 PROCEDURE — 63600175 PHARM REV CODE 636 W HCPCS: Performed by: STUDENT IN AN ORGANIZED HEALTH CARE EDUCATION/TRAINING PROGRAM

## 2023-07-11 PROCEDURE — 85730 THROMBOPLASTIN TIME PARTIAL: CPT

## 2023-07-11 PROCEDURE — 99291 CRITICAL CARE FIRST HOUR: CPT | Mod: GC,,, | Performed by: INTERNAL MEDICINE

## 2023-07-11 PROCEDURE — D9220A PRA ANESTHESIA: ICD-10-PCS | Mod: CRNA,,, | Performed by: NURSE ANESTHETIST, CERTIFIED REGISTERED

## 2023-07-11 PROCEDURE — C9113 INJ PANTOPRAZOLE SODIUM, VIA: HCPCS | Performed by: STUDENT IN AN ORGANIZED HEALTH CARE EDUCATION/TRAINING PROGRAM

## 2023-07-11 PROCEDURE — 94761 N-INVAS EAR/PLS OXIMETRY MLT: CPT

## 2023-07-11 PROCEDURE — 43235 PR EGD, FLEX, DIAGNOSTIC: ICD-10-PCS | Mod: GC,,, | Performed by: INTERNAL MEDICINE

## 2023-07-11 PROCEDURE — 27000221 HC OXYGEN, UP TO 24 HOURS

## 2023-07-11 PROCEDURE — 99291 PR CRITICAL CARE, E/M 30-74 MINUTES: ICD-10-PCS | Mod: GC,,, | Performed by: INTERNAL MEDICINE

## 2023-07-11 PROCEDURE — 99900035 HC TECH TIME PER 15 MIN (STAT)

## 2023-07-11 PROCEDURE — 84100 ASSAY OF PHOSPHORUS: CPT

## 2023-07-11 PROCEDURE — 25000003 PHARM REV CODE 250: Performed by: EMERGENCY MEDICINE

## 2023-07-11 PROCEDURE — D9220A PRA ANESTHESIA: ICD-10-PCS | Mod: ANES,,, | Performed by: ANESTHESIOLOGY

## 2023-07-11 PROCEDURE — 37000008 HC ANESTHESIA 1ST 15 MINUTES: Performed by: INTERNAL MEDICINE

## 2023-07-11 PROCEDURE — D9220A PRA ANESTHESIA: Mod: CRNA,,, | Performed by: NURSE ANESTHETIST, CERTIFIED REGISTERED

## 2023-07-11 RX ORDER — PROPOFOL 10 MG/ML
INJECTION, EMULSION INTRAVENOUS
Status: COMPLETED
Start: 2023-07-11 | End: 2023-07-11

## 2023-07-11 RX ORDER — MAGNESIUM SULFATE HEPTAHYDRATE 40 MG/ML
2 INJECTION, SOLUTION INTRAVENOUS ONCE
Status: COMPLETED | OUTPATIENT
Start: 2023-07-11 | End: 2023-07-11

## 2023-07-11 RX ORDER — SODIUM CHLORIDE 0.9 % (FLUSH) 0.9 %
3 SYRINGE (ML) INJECTION
Status: CANCELLED | OUTPATIENT
Start: 2023-07-11

## 2023-07-11 RX ORDER — POTASSIUM CHLORIDE 14.9 MG/ML
20 INJECTION INTRAVENOUS ONCE
Status: COMPLETED | OUTPATIENT
Start: 2023-07-11 | End: 2023-07-11

## 2023-07-11 RX ORDER — PROPOFOL 10 MG/ML
VIAL (ML) INTRAVENOUS CONTINUOUS PRN
Status: DISCONTINUED | OUTPATIENT
Start: 2023-07-11 | End: 2023-07-11

## 2023-07-11 RX ORDER — PROPOFOL 10 MG/ML
VIAL (ML) INTRAVENOUS
Status: DISCONTINUED | OUTPATIENT
Start: 2023-07-11 | End: 2023-07-11

## 2023-07-11 RX ORDER — PHENYLEPHRINE HYDROCHLORIDE 10 MG/ML
INJECTION INTRAVENOUS
Status: DISCONTINUED | OUTPATIENT
Start: 2023-07-11 | End: 2023-07-11

## 2023-07-11 RX ADMIN — PANTOPRAZOLE SODIUM 40 MG: 40 INJECTION, POWDER, FOR SOLUTION INTRAVENOUS at 08:07

## 2023-07-11 RX ADMIN — Medication 100 MG: at 11:07

## 2023-07-11 RX ADMIN — NOREPINEPHRINE BITARTRATE 0.02 MCG/KG/MIN: 4 INJECTION, SOLUTION INTRAVENOUS at 12:07

## 2023-07-11 RX ADMIN — LEVETIRACETAM 1000 MG: 100 INJECTION, SOLUTION INTRAVENOUS at 08:07

## 2023-07-11 RX ADMIN — CEFTRIAXONE 1 G: 1 INJECTION, POWDER, FOR SOLUTION INTRAMUSCULAR; INTRAVENOUS at 08:07

## 2023-07-11 RX ADMIN — PANTOPRAZOLE SODIUM 40 MG: 40 INJECTION, POWDER, FOR SOLUTION INTRAVENOUS at 09:07

## 2023-07-11 RX ADMIN — SODIUM CHLORIDE: 9 INJECTION, SOLUTION INTRAVENOUS at 10:07

## 2023-07-11 RX ADMIN — POTASSIUM CHLORIDE 20 MEQ: 14.9 INJECTION, SOLUTION INTRAVENOUS at 04:07

## 2023-07-11 RX ADMIN — LEVETIRACETAM 1000 MG: 100 INJECTION, SOLUTION INTRAVENOUS at 09:07

## 2023-07-11 RX ADMIN — SODIUM PHOSPHATE, MONOBASIC, MONOHYDRATE AND SODIUM PHOSPHATE, DIBASIC, ANHYDROUS 15 MMOL: 142; 276 INJECTION, SOLUTION INTRAVENOUS at 06:07

## 2023-07-11 RX ADMIN — MAGNESIUM SULFATE 2 G: 2 INJECTION INTRAVENOUS at 06:07

## 2023-07-11 RX ADMIN — PHENYLEPHRINE HYDROCHLORIDE 100 MCG: 10 INJECTION INTRAVENOUS at 11:07

## 2023-07-11 RX ADMIN — MUPIROCIN: 20 OINTMENT TOPICAL at 08:07

## 2023-07-11 RX ADMIN — PROPOFOL 175 MCG/KG/MIN: 10 INJECTION, EMULSION INTRAVENOUS at 11:07

## 2023-07-11 NOTE — SUBJECTIVE & OBJECTIVE
Interval History/Significant Events: Pt to undergo EGD today to re-evaluate for potential sources of bleeding. Pt will undergo SBT with plans to extubate as tolerated.     Review of Systems   Unable to perform ROS: Patient unresponsive   Objective:     Vital Signs (Most Recent):  Temp: 99 °F (37.2 °C) (07/11/23 0710)  Pulse: 75 (07/11/23 1000)  Resp: 13 (07/11/23 1000)  BP: (!) 116/58 (07/11/23 0200)  SpO2: 100 % (07/11/23 1000) Vital Signs (24h Range):  Temp:  [97.6 °F (36.4 °C)-99 °F (37.2 °C)] 99 °F (37.2 °C)  Pulse:  [59-87] 75  Resp:  [10-25] 13  SpO2:  [97 %-100 %] 100 %  BP: ()/(51-59) 116/58  Arterial Line BP: (101-129)/(42-51) 111/43   Weight: 54 kg (119 lb)  Body mass index is 21.77 kg/m².      Intake/Output Summary (Last 24 hours) at 7/11/2023 1058  Last data filed at 7/11/2023 1000  Gross per 24 hour   Intake 1194.31 ml   Output 555 ml   Net 639.31 ml            Physical Exam  Constitutional:       Appearance: She is ill-appearing and toxic-appearing.      Interventions: She is intubated.   HENT:      Head: Normocephalic.   Eyes:      General: Scleral icterus present.   Neck:      Thyroid: No thyroid mass or thyroid tenderness.      Vascular: No carotid bruit or hepatojugular reflux.   Cardiovascular:      Rate and Rhythm: Normal rate.      Pulses: Decreased pulses.      Heart sounds: Heart sounds are distant.   Pulmonary:      Effort: She is intubated.   Chest:      Chest wall: No mass, lacerations or deformity.   Breasts:     Right: No swelling.      Left: No swelling.   Abdominal:      General: Abdomen is flat.      Palpations: Abdomen is rigid.      Hernia: No hernia is present.   Genitourinary:     Vagina: Normal.   Musculoskeletal:      Cervical back: Rigidity present.   Lymphadenopathy:      Cervical: No cervical adenopathy.   Skin:     Coloration: Skin is jaundiced.      Findings: Ecchymosis present.   Neurological:      Mental Status: She is unresponsive.          Vents:  Vent Mode: (S)  Spont (07/11/23 0720)  Ventilator Initiated: Yes (07/05/23 1221)  Set Rate: 18 BPM (07/11/23 0720)  Vt Set: 300 mL (07/11/23 0720)  Pressure Support: 5 cmH20 (07/11/23 0720)  PEEP/CPAP: 5 cmH20 (07/11/23 0720)  Oxygen Concentration (%): 30 (07/11/23 1000)  Peak Airway Pressure: 10 cmH20 (07/11/23 0720)  Plateau Pressure: 0 cmH20 (07/11/23 0720)  Total Ve: 5.67 L/m (07/11/23 0720)  Negative Inspiratory Force (cm H2O): 0 (07/11/23 0720)  F/VT Ratio<105 (RSBI): (!) 65.87 (07/11/23 0720)  Lines/Drains/Airways       Central Venous Catheter Line  Duration             Percutaneous Central Line Insertion/Assessment - Triple Lumen  07/07/23 0232 Femoral Vein Right;Femoral Right 4 days              Drain  Duration                  NG/OG Tube 07/06/23 0323 Center mouth 5 days         Urethral Catheter 07/05/23 1332 Double-lumen 5 days         Fecal Incontinence  07/09/23 2300 1 day              Airway  Duration                  Airway - Non-Surgical 07/05/23 1220 Endotracheal Tube 5 days              Arterial Line  Duration             Arterial Line 07/07/23 0232 Left Radial 4 days              Peripheral Intravenous Line  Duration                  Peripheral IV - Single Lumen 07/06/23 0546 18 G Anterior;Right Upper Arm 5 days         Peripheral IV - Single Lumen 07/06/23 2202 18 G Anterior;Left Forearm 4 days                  Significant Labs:    CBC/Anemia Profile:  Recent Labs   Lab 07/10/23  0826 07/10/23  2035 07/11/23  0823   WBC 3.54* 3.18* 3.62*   HGB 7.6* 7.8* 8.3*   HCT 22.8* 24.5* 25.0*   PLT 48* 62* 67*   MCV 93 94 95   RDW 19.0* 20.2* 21.5*          Chemistries:  Recent Labs   Lab 07/10/23  0358 07/10/23  1653 07/10/23  2035 07/11/23  0309   * 149* 145 146*   K 3.8 3.2* 4.0 3.7   * 120* 118* 117*   CO2 22* 21* 23 22*   BUN 17 11 10 10   CREATININE 0.5 0.4* 0.4* 0.4*   CALCIUM 7.9* 7.9* 8.0* 7.7*   ALBUMIN 2.8* 2.7* 2.7* 2.6*   PROT 4.3* 4.4* 4.4* 4.2*   BILITOT 3.3* 2.6* 2.7* 2.7*   ALKPHOS  54* 55 60 65   ALT 10 10 10 10   AST 23 23 23 25   MG 2.1  --   --  1.9   PHOS 2.9 2.1* 2.0* 3.2         All pertinent labs within the past 24 hours have been reviewed.    Significant Imaging:  I have reviewed all pertinent imaging results/findings within the past 24 hours.

## 2023-07-11 NOTE — PROGRESS NOTES
[facility] - Cardiac Medical ICU  Critical Care Medicine  Progress Note    Patient Name: Marely Hamilton  MRN: [id number]  Admission Date: 7/5/2023  Hospital Length of Stay: 6 days  Code Status: DNR  Attending Provider: Cody Brothers MD  Primary Care Provider: Viktor Ross MD   Principal Problem: Gastrointestinal hemorrhage associated with duodenal ulcer    Subjective:     HPI:  Marely Hamilton is a 58 y.o. female with history of alcoholic cirrhosis, seizure disorder, DVT and IJ thrombus with recent admission for large retroperitoneal hemorrhage requiring IR embolization and IVC filter placement who presents for hematochezia. Onset this morning at [facility], alida blood per rectum per chart, sent to ED. Initially normotensive but then severe hypotension prompted initiation of levophed and intubation. Hgb 6.8, 2u pRBC given + 1u FFP ordered. Hgb 8.5 on 7/2. On levo and vaso currently. K 6.6 but renal function at baseline. Repeat pending. No prior EGDs on record.      Hospital/ICU Course:  Patient was seen at bedside, hematochezia still present post op by IR. Patient has required many transfusions of pRBCs and platelets due to ongoing down trending of Hgb. No clear source of ongoing hemorrhage by imaging. Patient has continued to require pressors to maintain MAP >65. Discussed with GI and IR regarding active bleeding post op, IR indicated there is not much else to do from their end bc they embolized a significant part of GDA. Pt is off of pressor requirement and not actively bleeding. GI plans to perform EGD to evaluate potential sources of bleeding. Patient will undergo SBT if procedure goes well with plans to extubate if tolerated.       Interval History/Significant Events: Pt to undergo EGD today to evaluate for potential sources of bleeding. Pt will undergo SBT with plans to extubate as tolerated.     Review of Systems   Unable to perform ROS: Patient unresponsive   Objective:     Vital Signs (Most Recent):  Temp: 99 °F  (37.2 °C) (07/11/23 0710)  Pulse: 75 (07/11/23 1000)  Resp: 13 (07/11/23 1000)  BP: (!) 116/58 (07/11/23 0200)  SpO2: 100 % (07/11/23 1000) Vital Signs (24h Range):  Temp:  [97.6 °F (36.4 °C)-99 °F (37.2 °C)] 99 °F (37.2 °C)  Pulse:  [59-87] 75  Resp:  [10-25] 13  SpO2:  [97 %-100 %] 100 %  BP: ()/(51-59) 116/58  Arterial Line BP: (101-129)/(42-51) 111/43   Weight: 54 kg (119 lb)  Body mass index is 21.77 kg/m².      Intake/Output Summary (Last 24 hours) at 7/11/2023 1058  Last data filed at 7/11/2023 1000  Gross per 24 hour   Intake 1194.31 ml   Output 555 ml   Net 639.31 ml            Physical Exam  Constitutional:       Appearance: She is ill-appearing and toxic-appearing.      Interventions: She is intubated.   HENT:      Head: Normocephalic.   Eyes:      General: Scleral icterus present.   Neck:      Thyroid: No thyroid mass or thyroid tenderness.      Vascular: No carotid bruit or hepatojugular reflux.   Cardiovascular:      Rate and Rhythm: Normal rate.      Pulses: Decreased pulses.      Heart sounds: Heart sounds are distant.   Pulmonary:      Effort: She is intubated.   Chest:      Chest wall: No mass, lacerations or deformity.   Breasts:     Right: No swelling.      Left: No swelling.   Abdominal:      General: Abdomen is flat.      Palpations: Abdomen is rigid.      Hernia: No hernia is present.   Genitourinary:     Vagina: Normal.   Musculoskeletal:      Cervical back: Rigidity present.   Lymphadenopathy:      Cervical: No cervical adenopathy.   Skin:     Coloration: Skin is jaundiced.      Findings: Ecchymosis present.   Neurological:      Mental Status: She is unresponsive.          Vents:  Vent Mode: (S) Spont (07/11/23 0720)  Ventilator Initiated: Yes (07/05/23 1221)  Set Rate: 18 BPM (07/11/23 0720)  Vt Set: 300 mL (07/11/23 0720)  Pressure Support: 5 cmH20 (07/11/23 0720)  PEEP/CPAP: 5 cmH20 (07/11/23 0720)  Oxygen Concentration (%): 30 (07/11/23 1000)  Peak Airway Pressure: 10 cmH20  (07/11/23 0720)  Plateau Pressure: 0 cmH20 (07/11/23 0720)  Total Ve: 5.67 L/m (07/11/23 0720)  Negative Inspiratory Force (cm H2O): 0 (07/11/23 0720)  F/VT Ratio<105 (RSBI): (!) 65.87 (07/11/23 0720)  Lines/Drains/Airways       Central Venous Catheter Line  Duration             Percutaneous Central Line Insertion/Assessment - Triple Lumen  07/07/23 0232 Femoral Vein Right;Femoral Right 4 days              Drain  Duration                  NG/OG Tube 07/06/23 0323 Center mouth 5 days         Urethral Catheter 07/05/23 1332 Double-lumen 5 days         Fecal Incontinence  07/09/23 2300 1 day              Airway  Duration                  Airway - Non-Surgical 07/05/23 1220 Endotracheal Tube 5 days              Arterial Line  Duration             Arterial Line 07/07/23 0232 Left Radial 4 days              Peripheral Intravenous Line  Duration                  Peripheral IV - Single Lumen 07/06/23 0546 18 G Anterior;Right Upper Arm 5 days         Peripheral IV - Single Lumen 07/06/23 2202 18 G Anterior;Left Forearm 4 days                  Significant Labs:    CBC/Anemia Profile:  Recent Labs   Lab 07/10/23  0826 07/10/23  2035 07/11/23  0823   WBC 3.54* 3.18* 3.62*   HGB 7.6* 7.8* 8.3*   HCT 22.8* 24.5* 25.0*   PLT 48* 62* 67*   MCV 93 94 95   RDW 19.0* 20.2* 21.5*          Chemistries:  Recent Labs   Lab 07/10/23  0358 07/10/23  1653 07/10/23  2035 07/11/23  0309   * 149* 145 146*   K 3.8 3.2* 4.0 3.7   * 120* 118* 117*   CO2 22* 21* 23 22*   BUN 17 11 10 10   CREATININE 0.5 0.4* 0.4* 0.4*   CALCIUM 7.9* 7.9* 8.0* 7.7*   ALBUMIN 2.8* 2.7* 2.7* 2.6*   PROT 4.3* 4.4* 4.4* 4.2*   BILITOT 3.3* 2.6* 2.7* 2.7*   ALKPHOS 54* 55 60 65   ALT 10 10 10 10   AST 23 23 23 25   MG 2.1  --   --  1.9   PHOS 2.9 2.1* 2.0* 3.2         All pertinent labs within the past 24 hours have been reviewed.    Significant Imaging:  I have reviewed all pertinent imaging results/findings within the past 24  hours.      ABG  Recent Labs   Lab 07/09/23  1236   PH 7.387   PO2 191*   PCO2 37.2   HCO3 22.4*   BE -3     Assessment/Plan:     Oncology  Acute blood loss anemia  - Maintain IV access with 2 large bore Ivs  - Intravascular resuscitation/support with IVFs   - Hold all NSAIDs and anticoagulants, unless contraindicated.  - Please correct any coagulopathy with platelets and FFP for goal of platelets >50K and INR <2.0  - Please notify GI team if there is significant change in patient's clinical status  - To date, patient has required at least 21 units of pRBCs to maintain Hgb >7         Endocrine  Severe protein-calorie malnutrition  Nutrition consulted. Most recent weight and BMI monitored-     Measurements:  Wt Readings from Last 1 Encounters:   07/07/23 54 kg (119 lb)   Body mass index is 21.77 kg/m².    Patient has been screened and assessed by RD.    Malnutrition Type:  Context: social/environmental circumstances  Level: severe    Malnutrition Characteristic Summary:  Energy Intake (Malnutrition): less than or equal to 75% for greater than or equal to 1 month  Subcutaneous Fat (Malnutrition): severe depletion  Muscle Mass (Malnutrition): severe depletion    Interventions/Recommendations (treatment strategy):  1. When medically able, initiate TF regimen of Impact Peptide 1.5 @ goal rate of 45 mL/hr- provides 1620 kcals, 101 g pro, and 832 mL fluid. 2. If extubated and able to tolerate PO intake before next RD f/u, ADAT to regular- texture per SLP. 3. RD following.     Plan  -Initiating tube feeds with above formulation  -NPO at midnight     GI  * Gastrointestinal hemorrhage associated with duodenal ulcer  See above     Hematochezia  - GI EGD clipped duodenal ulcers for IR reference  - Transfuse blood products for active bleeding   - trend CBC and follow  - IR embolized GDA for duodenal hyperemia   -- GI to perform EGD to evaluate current condition of ulcers    Other  Retroperitoneal bleed  - CT-A demonstrated  stable retroperitoneal hematoma. No need for intervention at this time.       Critical Care Daily Checklist:    A: Awake: RASS Goal/Actual Goal: RASS Goal: 0-->alert and calm  Actual:     B: Spontaneous Breathing Trial Performed? Spon. Breathing Trial Initiated?: Initiated (07/11/23 0714)   C: SAT & SBT Coordinated?                     Plan for SBT today   D: Delirium: CAM-ICU Overall CAM-ICU: Positive   E: Early Mobility Performed? Yes   F: Feeding Goal: Goals: Will meet % EEN/EPN by next RD f/u.  Status: Nutrition Goal Status: new   Current Diet Order   Procedures    Diet NPO      AS: Analgesia/Sedation IV Fentanyl   T: Thromboembolic Prophylaxis n/a   H: HOB > 300 Yes   U: Stress Ulcer Prophylaxis (if needed) PPI   G: Glucose Control SSI   B: Bowel Function Stool Occurrence: 1   I: Indwelling Catheter (Lines & Saldaña) Necessity Saldaña   D: De-escalation of Antimicrobials/Pharmacotherapies n/a    Plan for the day/ETD EGD, Possible SBT    Code Status:  Family/Goals of Care: DNR  n/a       Critical secondary to Patient has a condition that poses threat to life and bodily function: Hemorrhagic shock      Critical care was time spent personally by me on the following activities: development of treatment plan with patient or surrogate and bedside caregivers, discussions with consultants, evaluation of patient's response to treatment, examination of patient, ordering and performing treatments and interventions, ordering and review of laboratory studies, ordering and review of radiographic studies, pulse oximetry, re-evaluation of patient's condition. This critical care time did not overlap with that of any other provider or involve time for any procedures.     Phong Jean,   Critical Care Medicine  [facility] - Cardiac Medical ICU

## 2023-07-11 NOTE — PROVATION PATIENT INSTRUCTIONS
Discharge Summary/Instructions after an Endoscopic Procedure  Patient Name: Marely Hamilton  Patient MRN: [id number]  Patient YOB: 1965 Tuesday, July 11, 2023  Rangel Broussard MD  Dear patient,  As a result of recent federal legislation (The Federal Cures Act), you may   receive lab or pathology results from your procedure in your [facility]   account before your physician is able to contact you. Your physician or   their representative will relay the results to you with their   recommendations at their soonest availability.  Thank you,  RESTRICTIONS:  During your procedure today, you received medications for sedation.  These   medications may affect your judgment, balance and coordination.  Therefore,   for 24 hours, you have the following restrictions:   - DO NOT drive a car, operate machinery, make legal/financial decisions,   sign important papers or drink alcohol.    ACTIVITY:  Today: no heavy lifting, straining or running due to procedural   sedation/anesthesia.  The following day: return to full activity including work.  DIET:  Eat and drink normally unless instructed otherwise.     TREATMENT FOR COMMON SIDE EFFECTS:  - Mild abdominal pain, nausea, belching, bloating or excessive gas:  rest,   eat lightly and use a heating pad.  - Sore Throat: treat with throat lozenges and/or gargle with warm salt   water.  - Because air was used during the procedure, expelling large amounts of air   from your rectum or belching is normal.  - If a bowel prep was taken, you may not have a bowel movement for 1-3 days.    This is normal.  SYMPTOMS TO WATCH FOR AND REPORT TO YOUR PHYSICIAN:  1. Abdominal pain or bloating, other than gas cramps.  2. Chest pain.  3. Back pain.  4. Signs of infection such as: chills or fever occurring within 24 hours   after the procedure.  5. Rectal bleeding, which would show as bright red, maroon, or black stools.   (A tablespoon of blood from the rectum is not serious, especially if    hemorrhoids are present.)  6. Vomiting.  7. Weakness or dizziness.  GO DIRECTLY TO THE NEAREST EMERGENCY ROOM IF YOU HAVE ANY OF THE FOLLOWING:      Difficulty breathing              Chills and/or fever over 101 F   Persistent vomiting and/or vomiting blood   Severe abdominal pain   Severe chest pain   Black, tarry stools   Bleeding- more than one tablespoon   Any other symptom or condition that you feel may need urgent attention  Your doctor recommends these additional instructions:  If any biopsies were taken, your doctors clinic will contact you in 1 to 2   weeks with any results.  - Observe patient in ICU for ongoing care.   - Resume previous diet.   - Continue present medications.   - Perform an H. pylori serology.  For questions, problems or results please call your physician - Rangel Broussard MD at Work:  [phone].  [facility], EMERGENCY ROOM PHONE NUMBER: [phone]  IF A COMPLICATION OR EMERGENCY SITUATION ARISES AND YOU ARE UNABLE TO REACH   YOUR PHYSICIAN - GO DIRECTLY TO THE EMERGENCY ROOM.  Rangel Broussard MD  7/11/2023 11:46:12 AM  This report has been verified and signed electronically.  Dear patient,  As a result of recent federal legislation (The Federal Cures Act), you may   receive lab or pathology results from your procedure in your [facility]   account before your physician is able to contact you. Your physician or   their representative will relay the results to you with their   recommendations at their soonest availability.  Thank you,  PROVATION

## 2023-07-11 NOTE — RESPIRATORY THERAPY
Pt extubated at 1529 and is on RA and SPO2 is 96 she is stable at the moment mo gonzalo present pt is able to phonate with no difficulty breathing. Will continue to monitor.

## 2023-07-11 NOTE — H&P
Endoscopy History and Physical    PCP - Viktor Ross MD    Procedure - EGD  ASA - per anesthesia  Mallampati - per anesthesia  Plan of anesthesia - MAC    HPI:  This is a 58 y.o. 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    Surgical History:  has a past surgical history that includes Esophagogastroduodenoscopy; Cosmetic surgery; Embolization (N/A, 6/11/2023); and Esophagogastroduodenoscopy (N/A, 7/6/2023).    Family History: family history includes Alcohol abuse in her father, paternal grandfather, and sister; Bipolar disorder in her father; Hypertension in her mother; Suicide in her father.. Otherwise no colon cancer, inflammatory bowel disease, or GI malignancies.    Social History:  reports that she has never smoked. She has never used smokeless tobacco. She reports that she does not currently use alcohol. She reports that she does not use drugs.    Review of patient's allergies indicates:   Allergen Reactions    Sulfa (sulfonamide  antibiotics) Rash    Codeine Nausea And Vomiting       Medications:   Medications Prior to Admission   Medication Sig Dispense Refill Last Dose    ARIPiprazole (ABILIFY) 20 MG Tab Take 5 mg by mouth once daily.       folic acid (FOLVITE) 1 MG tablet Take 1 tablet (1 mg total) by mouth once daily. (Patient taking differently: Take 1 mg by mouth every evening.) 30 tablet 2     furosemide (LASIX) 20 MG tablet Take 20 mg by mouth once daily.       lactulose (CHRONULAC) 10 gram/15 mL solution Take 10 g by mouth 3 (three) times daily.       lithium carbonate 150 MG capsule Take 1 capsule (150 mg total) by mouth every evening. (Patient taking differently: Take 150 mg by mouth 2 (two) times a day.) 30 capsule 11     magnesium oxide (MAG-OX) 400 mg (241.3 mg magnesium) tablet Take by mouth once daily.       pantoprazole (PROTONIX) 40 MG tablet Take 40 mg by mouth once daily.       propranoloL (INDERAL) 40 MG tablet Take 40 mg by mouth once daily.       QUEtiapine (SEROQUEL) 300 MG Tab Take 100 mg by mouth every evening.       sodium bicarbonate 650 MG tablet Take 650 mg by mouth Daily.       spironolactone (ALDACTONE) 50 MG tablet Take 1 tablet (50 mg total) by mouth once daily. 90 tablet 3     zonisamide (ZONEGRAN) 100 MG Cap Take 100 mg by mouth once daily.       levETIRAcetam (KEPPRA) 1000 MG tablet Take 1,000 mg by mouth 2 (two) times daily.       OLANZapine (ZYPREXA) 10 MG tablet Take 1 tablet (10 mg total) by mouth every evening. (Patient not taking: Reported on 7/6/2023) 30 tablet 11 Not Taking    rifAXIMin (XIFAXAN) 550 mg Tab Take 1 tablet (550 mg total) by mouth 2 (two) times daily. (Patient not taking: Reported on 7/6/2023) 180 tablet 3 Not Taking         Vital Signs:   Vitals:    07/11/23 0900   BP:    Pulse: 82   Resp: 13   Temp:        General Appearance: Well appearing in no acute distress  Eyes:    No scleral icterus  ENT: atraumatic  Abdomen: Soft, nondistended  Extremities: no tenderness  Skin: normal  color    Labs:  Lab Results   Component Value Date    WBC 3.62 (L) 07/11/2023    HGB 8.3 (L) 07/11/2023    HCT 25.0 (L) 07/11/2023    PLT 67 (L) 07/11/2023    CHOL 138 12/11/2020    TRIG 39 12/11/2020    HDL 48 12/11/2020    ALT 10 07/11/2023    AST 25 07/11/2023     (H) 07/11/2023    K 3.7 07/11/2023     (H) 07/11/2023    CREATININE 0.4 (L) 07/11/2023    BUN 10 07/11/2023    CO2 22 (L) 07/11/2023    TSH 0.582 05/24/2023    INR 1.5 (H) 07/11/2023    HGBA1C <4.0 (A) 05/29/2023       I have explained the risks and benefits of endoscopy procedures to the patient/their POA including but not limited to bleeding, perforation, infection, and death.  The patient/POA was asked if they understand and allowed to ask any further questions to their satisfaction.    Proceed with EGD    Roberto Faith MD

## 2023-07-11 NOTE — PLAN OF CARE
MICU DAILY GOALS       A: Awake    RASS: Goal - RASS Goal: 0-->alert and calm  Actual - RASS (Pepper Agitation-Sedation Scale): alert and calm   Restraint necessity: Clinical Justification: Removing medical devices  B: Breathe   SBT: Not intubated   C: Coordinate A & B, analgesics/sedatives   Pain: managed    SAT: Not intubated  D: Delirium   CAM-ICU: Overall CAM-ICU: Positive  E: Early(intubated/ Progressive (non-intubated) Mobility   MOVE Screen: Pass   Activity: Activity Management: Rolling - L1  FAS: Feeding/Nutrition   Diet order: Diet/Nutrition Received: NPO,    T: Thrombus   DVT prophylaxis: VTE Required Core Measure: Per order contraindicated for SCDs/Anticoagulants  H: HOB Elevation   Head of Bed (HOB) Positioning: HOB elevated  U: Ulcer Prophylaxis   GI: yes  G: Glucose control   managed    S: Skin   Bathing/Skin Care: incontinence care  Device Skin Pressure Protection: absorbent pad utilized/changed, adhesive use limited  Pressure Reduction Devices: specialty bed utilized  Pressure Reduction Techniques: weight shift assistance provided  Skin Protection: adhesive use limited, incontinence pads utilized, tubing/devices free from skin contact    B: Bowel Function   diarrhea   I: Indwelling Catheters   Saldaña necessity:      Urethral Catheter 07/05/23 1332 Double-lumen-Reason for Continuing Urinary Catheterization: Critically ill in ICU and requiring hourly monitoring of intake/output   CVC necessity: Yes  D: De-escalation Antibiotics   Yes    Family/Goals of care/Code Status   Code Status: DNR    24H Vital Sign Range  Temp:  [97.7 °F (36.5 °C)-99 °F (37.2 °C)]   Pulse:  [66-87]   Resp:  [13-25]   BP: (101-116)/(51-58)   SpO2:  [95 %-100 %]   Arterial Line BP: (101-144)/(42-53)      Shift Events   No acute events throughout shift. EGD completed. Pt extubated to room air, tolerating well. SLP consult placed.    VS and assessment per flow sheet, patient progressing towards goals as tolerated, plan of care  reviewed with patient, all concerns addressed, will continue to monitor.    Britton Joseph

## 2023-07-11 NOTE — ANESTHESIA POSTPROCEDURE EVALUATION
Anesthesia Post Evaluation    Patient: Marely Hamilton    Procedure(s) Performed: Procedure(s) (LRB):  EGD (ESOPHAGOGASTRODUODENOSCOPY) (N/A)    Final Anesthesia Type: general      Patient location during evaluation: ICU  Patient participation: Yes- Able to Participate  Level of consciousness: awake and alert  Post-procedure vital signs: reviewed and stable  Pain management: adequate  Airway patency: patent    PONV status at discharge: No PONV  Anesthetic complications: no      Cardiovascular status: blood pressure returned to baseline  Respiratory status: unassisted  Hydration status: euvolemic  Follow-up not needed.          Vitals Value Taken Time   BP  07/11/23 1220   Temp  07/11/23 1220   Pulse 78 07/11/23 1220   Resp 15 07/11/23 1220   SpO2 99 % 07/11/23 1220   Vitals shown include unvalidated device data.      No case tracking events are documented in the log.      Pain/Jaison Score: No data recorded

## 2023-07-11 NOTE — SUBJECTIVE & OBJECTIVE
Subjective:     Interval History: Followed up with patient s/p EGD 7/6 with duodenal ulcers noted with small ooze. Clip placed. Continued bleeding and CTA completed. Empiric embolization on 7/7 at the location of the clips. Patient has continued to have decreasing blood counts requiring transfusion and pressor requirements. Hemodynamics improving and bleeding has seemed to be improving. Repeat CTA done 7/10 without extravasation. BMS in place with dark stool output noted.     Review of Systems   Unable to perform ROS: Intubated   Objective:     Vital Signs (Most Recent):  Temp: 99 °F (37.2 °C) (07/11/23 0710)  Pulse: 82 (07/11/23 0900)  Resp: 13 (07/11/23 0900)  BP: (!) 116/58 (07/11/23 0200)  SpO2: 100 % (07/11/23 0900) Vital Signs (24h Range):  Temp:  [97.6 °F (36.4 °C)-99 °F (37.2 °C)] 99 °F (37.2 °C)  Pulse:  [59-87] 82  Resp:  [10-25] 13  SpO2:  [97 %-100 %] 100 %  BP: ()/(51-59) 116/58  Arterial Line BP: (101-129)/(42-51) 118/44     Weight: 54 kg (119 lb) (07/07/23 0400)  Body mass index is 21.77 kg/m².      Intake/Output Summary (Last 24 hours) at 7/11/2023 0935  Last data filed at 7/11/2023 0900  Gross per 24 hour   Intake 1111.04 ml   Output 555 ml   Net 556.04 ml         Lines/Drains/Airways       Central Venous Catheter Line  Duration             Percutaneous Central Line Insertion/Assessment - Triple Lumen  07/07/23 0232 Femoral Vein Right;Femoral Right 4 days              Drain  Duration                  NG/OG Tube 07/06/23 0323 Center mouth 5 days         Urethral Catheter 07/05/23 1332 Double-lumen 5 days         Fecal Incontinence  07/09/23 2300 1 day              Airway  Duration                  Airway - Non-Surgical 07/05/23 1220 Endotracheal Tube 5 days              Arterial Line  Duration             Arterial Line 07/07/23 0232 Left Radial 4 days              Peripheral Intravenous Line  Duration                  Peripheral IV - Single Lumen 07/06/23 0546 18 G Anterior;Right Upper  Arm 5 days         Peripheral IV - Single Lumen 07/06/23 2202 18 G Anterior;Left Forearm 4 days                     Physical Exam  Vitals and nursing note reviewed.   Constitutional:       Appearance: She is ill-appearing.   HENT:      Mouth/Throat:      Mouth: Mucous membranes are dry.      Comments: OG in place with bloody contents  Eyes:      General: Scleral icterus present.   Abdominal:      General: Abdomen is flat. Bowel sounds are normal. There is no distension.      Palpations: Abdomen is soft. There is no mass.      Tenderness: There is no abdominal tenderness.      Hernia: No hernia is present.   Musculoskeletal:         General: Swelling present.   Skin:     General: Skin is warm.      Coloration: Skin is jaundiced and pale.      Findings: Bruising present. No erythema.   Neurological:      General: No focal deficit present.      Comments: Intubated, sedated        Significant Labs:  CBC:   Recent Labs   Lab 07/10/23  0826 07/10/23  2035 07/11/23  0823   WBC 3.54* 3.18* 3.62*   HGB 7.6* 7.8* 8.3*   HCT 22.8* 24.5* 25.0*   PLT 48* 62* 67*       CMP:   Recent Labs   Lab 07/11/23  0309   *   CALCIUM 7.7*   ALBUMIN 2.6*   PROT 4.2*   *   K 3.7   CO2 22*   *   BUN 10   CREATININE 0.4*   ALKPHOS 65   ALT 10   AST 25   BILITOT 2.7*       Coagulation:   Recent Labs   Lab 07/11/23  0309 07/11/23  0823   INR  --  1.5*   APTT 31.2  --            Significant Imaging:  Imaging results within the past 24 hours have been reviewed.    EGD 7/6/2023  Impression:            - Esophageal ulcer with no bleeding and no                          stigmata of recent bleeding.                          - No esophageal varices.                          - Non bleeding gastric ulcer likely due to OG tube                          trauma.                          - Non-bleeding duodenal ulcers with no stigmata of                          bleeding. Small amount of oozing noted. Clip was                          placed  for marking.                          - No specimens collected.   Recommendation:        - Return patient to ICU for ongoing care.                          - NPO.                          -Continue high dose IV PPI.                          - If significant overt GI bleeding occurs then                          consider STAT CTA abdomen and pelvis with bleeding                          protocol and IR consult for possible embolization                          vs empiric embolization by IR targeting the clip                          that was placed adjacent to the duodenal ulcers.   Attending Participation:        I personally performed the entire procedure.   Bernice Guillen MD   7/6/2023 7:52:51 PM     CTA GI Bleed 7/10/2023  Impression:     Interval gastroduodenal artery coil embolization.  Evaluation of the duodenum is somewhat limited secondary to beam hardening artifact however there is no definite evidence of active contrast extravasation to suggest active bleeding.     Stable large left retroperitoneal hematoma and smaller left iliacus hematoma.  No new retroperitoneal hematoma.     Cardiomegaly and reflux of contrast into the hepatic IVC, which can indicate right heart or valvular disease.     Cirrhotic liver morphology with stigmata of portal hypertension including splenomegaly, small volume ascites, and collateral vessels.     Severe anasarca, similar but worse from prior.     Small bilateral pleural effusions.     Additional stable findings above.     Electronically signed by resident: Amari Shabazz  Date:                                            07/10/2023  Time:                                           06:17

## 2023-07-11 NOTE — PROGRESS NOTES
[facility] - Cardiac Medical ICU  Critical Care Medicine  Progress Note    Patient Name: Marely Hamilton  MRN: [id number]  Admission Date: 7/5/2023  Hospital Length of Stay: 6 days  Code Status: DNR  Attending Provider: Cody Brothers MD  Primary Care Provider: Viktor Ross MD   Principal Problem: Gastrointestinal hemorrhage associated with duodenal ulcer    Subjective:     HPI:  Marely Hamilton is a 58 y.o. female with history of alcoholic cirrhosis, seizure disorder, DVT and IJ thrombus with recent admission for large retroperitoneal hemorrhage requiring IR embolization and IVC filter placement who presents for hematochezia. Onset this morning at [facility], alida blood per rectum per chart, sent to ED. Initially normotensive but then severe hypotension prompted initiation of levophed and intubation. Hgb 6.8, 2u pRBC given + 1u FFP ordered. Hgb 8.5 on 7/2. On levo and vaso currently. K 6.6 but renal function at baseline. Repeat pending. No prior EGDs on record.      Hospital/ICU Course:  Patient was seen at bedside, hematochezia still present post op by IR. Patient has required many transfusions of pRBCs and platelets due to ongoing down trending of Hgb. No clear source of ongoing hemorrhage by imaging. Patient has continued to require pressors to maintain MAP >65. Discussed with GI and IR regarding active bleeding post op, IR indicated there is not much else to do from their end bc they embolized a significant part of GDA. Pt is off of pressor requirement and not actively bleeding. GI plans to perform EGD to re-evaluate potential sources of bleeding. Patient will undergo SBT if procedure goes well with plans to extubate if tolerated.       No new subjective & objective note has been filed under this hospital service since the last note was generated.      ABG  Recent Labs   Lab 07/09/23  1236   PH 7.387   PO2 191*   PCO2 37.2   HCO3 22.4*   BE -3     Assessment/Plan:     Oncology  Acute blood loss anemia  - Maintain IV  access with 2 large bore Ivs  - Intravascular resuscitation/support with IVFs   - Hold all NSAIDs and anticoagulants, unless contraindicated.  - Please correct any coagulopathy with platelets and FFP for goal of platelets >50K and INR <2.0  - Please notify GI team if there is significant change in patient's clinical status  - To date, patient has required at least 21 units of pRBCs to maintain Hgb >7         Endocrine  Severe protein-calorie malnutrition  Nutrition consulted. Most recent weight and BMI monitored-     Measurements:  Wt Readings from Last 1 Encounters:   07/07/23 54 kg (119 lb)   Body mass index is 21.77 kg/m².    Patient has been screened and assessed by RD.    Malnutrition Type:  Context: social/environmental circumstances  Level: severe    Malnutrition Characteristic Summary:  Energy Intake (Malnutrition): less than or equal to 75% for greater than or equal to 1 month  Subcutaneous Fat (Malnutrition): severe depletion  Muscle Mass (Malnutrition): severe depletion    Interventions/Recommendations (treatment strategy):  1. When medically able, initiate TF regimen of Impact Peptide 1.5 @ goal rate of 45 mL/hr- provides 1620 kcals, 101 g pro, and 832 mL fluid. 2. If extubated and able to tolerate PO intake before next RD f/u, ADAT to regular- texture per SLP. 3. RD following.     Plan  -Initiating tube feeds with above formulation  -NPO at midnight     GI  * Gastrointestinal hemorrhage associated with duodenal ulcer  See above     Hematochezia  - GI EGD clipped duodenal ulcers for IR reference  - Transfuse blood products for active bleeding   - trend CBC and follow  - IR embolized GDA for duodenal hyperemia   -- GI to perform EGD to evaluate current condition of ulcers    Other  Retroperitoneal bleed  - CT-A demonstrated stable retroperitoneal hematoma. No need for intervention at this time.       Critical Care Daily Checklist:    A: Awake: RASS Goal/Actual Goal: RASS Goal: 0-->alert and calm  Actual:      B: Spontaneous Breathing Trial Performed? Spon. Breathing Trial Initiated?: Initiated (07/11/23 0714)   C: SAT & SBT Coordinated?  SBT today                      D: Delirium: CAM-ICU Overall CAM-ICU: Positive   E: Early Mobility Performed? Yes   F: Feeding Goal: Goals: Will meet % EEN/EPN by next RD f/u.  Status: Nutrition Goal Status: new   Current Diet Order   Procedures    Diet NPO      AS: Analgesia/Sedation IV fentanyl   T: Thromboembolic Prophylaxis N/a   H: HOB > 300 Yes   U: Stress Ulcer Prophylaxis (if needed) PPI   G: Glucose Control SSI   B: Bowel Function Stool Occurrence: 1   I: Indwelling Catheter (Lines & Saldaña) Necessity Saldaña   D: De-escalation of Antimicrobials/Pharmacotherapies n/a    Plan for the day/ETD EGD, possible SBT    Code Status:  Family/Goals of Care: DNR  n/a       Critical secondary to Patient has a condition that poses threat to life and bodily function: Hemorrhagic shock       Critical care was time spent personally by me on the following activities: development of treatment plan with patient or surrogate and bedside caregivers, discussions with consultants, evaluation of patient's response to treatment, examination of patient, ordering and performing treatments and interventions, ordering and review of laboratory studies, ordering and review of radiographic studies, pulse oximetry, re-evaluation of patient's condition. This critical care time did not overlap with that of any other provider or involve time for any procedures.     Phong Jean, DO  Critical Care Medicine  [facility] - Cardiac Medical ICU

## 2023-07-11 NOTE — ASSESSMENT & PLAN NOTE
- GI EGD clipped duodenal ulcers for IR reference  - Transfuse blood products for active bleeding   - trend CBC and follow  - IR embolized GDA for duodenal hyperemia   -- GI to perform EGD to evaluate current condition of ulcers

## 2023-07-11 NOTE — ASSESSMENT & PLAN NOTE
- CT-A demonstrated stable retroperitoneal hematoma. No need for intervention at this time.   (1) Oriented to own ability

## 2023-07-11 NOTE — TRANSFER OF CARE
"Anesthesia Transfer of Care Note    Patient: Marely Hamilton    Procedure(s) Performed: Procedure(s) (LRB):  EGD (ESOPHAGOGASTRODUODENOSCOPY) (N/A)    Patient location: ICU    Anesthesia Type: general (remains intubated in thee ICU)    Post pain: adequate analgesia    Post assessment: no apparent anesthetic complications    Post vital signs: stable    Level of consciousness: awake    Nausea/Vomiting: no nausea/vomiting    Complications: none    Transfer of care protocol was followed      Last vitals:   Visit Vitals  BP (!) 116/58 (BP Location: Left arm, Patient Position: Lying)   Pulse 75   Temp 37.2 °C (99 °F) (Axillary)   Resp 13   Ht 5' 2" (1.575 m)   Wt 54 kg (119 lb)   SpO2 100%   Breastfeeding No   BMI 21.77 kg/m²     "

## 2023-07-11 NOTE — PROGRESS NOTES
[facility] - Cardiac Medical ICU  Gastroenterology  Progress Note    Patient Name: Marely Hamilton  MRN: [id number]  Admission Date: 7/5/2023  Hospital Length of Stay: 6 days  Code Status: DNR   Attending Provider: Cody Brothers MD  Consulting Provider: Robina Damian NP  Primary Care Physician: Viktor Ross MD  Principal Problem: Gastrointestinal hemorrhage associated with duodenal ulcer    Subjective:     Interval History: Followed up with patient s/p EGD 7/6 with duodenal ulcers noted with small ooze. Clip placed. Continued bleeding and CTA completed. Empiric embolization on 7/7 at the location of the clips. Patient has continued to have decreasing blood counts requiring transfusion and pressor requirements. Hemodynamics improving and bleeding has seemed to be improving. Repeat CTA done 7/10 without extravasation. BMS in place with dark stool output noted.     Review of Systems   Unable to perform ROS: Intubated   Objective:     Vital Signs (Most Recent):  Temp: 99 °F (37.2 °C) (07/11/23 0710)  Pulse: 82 (07/11/23 0900)  Resp: 13 (07/11/23 0900)  BP: (!) 116/58 (07/11/23 0200)  SpO2: 100 % (07/11/23 0900) Vital Signs (24h Range):  Temp:  [97.6 °F (36.4 °C)-99 °F (37.2 °C)] 99 °F (37.2 °C)  Pulse:  [59-87] 82  Resp:  [10-25] 13  SpO2:  [97 %-100 %] 100 %  BP: ()/(51-59) 116/58  Arterial Line BP: (101-129)/(42-51) 118/44     Weight: 54 kg (119 lb) (07/07/23 0400)  Body mass index is 21.77 kg/m².      Intake/Output Summary (Last 24 hours) at 7/11/2023 0935  Last data filed at 7/11/2023 0900  Gross per 24 hour   Intake 1111.04 ml   Output 555 ml   Net 556.04 ml         Lines/Drains/Airways       Central Venous Catheter Line  Duration             Percutaneous Central Line Insertion/Assessment - Triple Lumen  07/07/23 0232 Femoral Vein Right;Femoral Right 4 days              Drain  Duration                  NG/OG Tube 07/06/23 0323 Center mouth 5 days         Urethral Catheter 07/05/23 1332 Double-lumen 5 days          Fecal Incontinence  07/09/23 2300 1 day              Airway  Duration                  Airway - Non-Surgical 07/05/23 1220 Endotracheal Tube 5 days              Arterial Line  Duration             Arterial Line 07/07/23 0232 Left Radial 4 days              Peripheral Intravenous Line  Duration                  Peripheral IV - Single Lumen 07/06/23 0546 18 G Anterior;Right Upper Arm 5 days         Peripheral IV - Single Lumen 07/06/23 2202 18 G Anterior;Left Forearm 4 days                     Physical Exam  Vitals and nursing note reviewed.   Constitutional:       Appearance: She is ill-appearing.   HENT:      Mouth/Throat:      Mouth: Mucous membranes are dry.      Comments: OG in place with bloody contents  Eyes:      General: Scleral icterus present.   Abdominal:      General: Abdomen is flat. Bowel sounds are normal. There is no distension.      Palpations: Abdomen is soft. There is no mass.      Tenderness: There is no abdominal tenderness.      Hernia: No hernia is present.   Musculoskeletal:         General: Swelling present.   Skin:     General: Skin is warm.      Coloration: Skin is jaundiced and pale.      Findings: Bruising present. No erythema.   Neurological:      General: No focal deficit present.      Comments: Intubated, sedated        Significant Labs:  CBC:   Recent Labs   Lab 07/10/23  0826 07/10/23  2035 07/11/23  0823   WBC 3.54* 3.18* 3.62*   HGB 7.6* 7.8* 8.3*   HCT 22.8* 24.5* 25.0*   PLT 48* 62* 67*       CMP:   Recent Labs   Lab 07/11/23  0309   *   CALCIUM 7.7*   ALBUMIN 2.6*   PROT 4.2*   *   K 3.7   CO2 22*   *   BUN 10   CREATININE 0.4*   ALKPHOS 65   ALT 10   AST 25   BILITOT 2.7*       Coagulation:   Recent Labs   Lab 07/11/23  0309 07/11/23  0823   INR  --  1.5*   APTT 31.2  --            Significant Imaging:  Imaging results within the past 24 hours have been reviewed.    EGD 7/6/2023  Impression:            - Esophageal ulcer with no bleeding and no                           stigmata of recent bleeding.                          - No esophageal varices.                          - Non bleeding gastric ulcer likely due to OG tube                          trauma.                          - Non-bleeding duodenal ulcers with no stigmata of                          bleeding. Small amount of oozing noted. Clip was                          placed for marking.                          - No specimens collected.   Recommendation:        - Return patient to ICU for ongoing care.                          - NPO.                          -Continue high dose IV PPI.                          - If significant overt GI bleeding occurs then                          consider STAT CTA abdomen and pelvis with bleeding                          protocol and IR consult for possible embolization                          vs empiric embolization by IR targeting the clip                          that was placed adjacent to the duodenal ulcers.   Attending Participation:        I personally performed the entire procedure.   Bernice Guillen MD   7/6/2023 7:52:51 PM     CTA GI Bleed 7/10/2023  Impression:     Interval gastroduodenal artery coil embolization.  Evaluation of the duodenum is somewhat limited secondary to beam hardening artifact however there is no definite evidence of active contrast extravasation to suggest active bleeding.     Stable large left retroperitoneal hematoma and smaller left iliacus hematoma.  No new retroperitoneal hematoma.     Cardiomegaly and reflux of contrast into the hepatic IVC, which can indicate right heart or valvular disease.     Cirrhotic liver morphology with stigmata of portal hypertension including splenomegaly, small volume ascites, and collateral vessels.     Severe anasarca, similar but worse from prior.     Small bilateral pleural effusions.     Additional stable findings above.     Electronically signed by resident: Amari Shabazz  Date:                                             07/10/2023  Time:                                           06:17    Assessment/Plan:     Oncology  Acute blood loss anemia  58 F with alcoholic cirrhosis admitted with hemorrhagic shock. EGD 7/06 with oozing duodenal ulcers but nothing treatable, clip placed. Developed worsening hemodynamics and overt bleeding leading to transfusion of multiple blood products. Ultimately empirically embolized with IR on 7/07 near location of clip but no active bleeding seen on CTA. She remains intubated with ongoing dark bowel movements. No transfusion overnight.     Recommendations:  - Will proceed with EGD today for evaluation of possible cause of recurrent bleeding.  - CBC every 8 hours or as clinically indicated  - Continue IV PPI 40 mg BID  - Correct coagulopathy for INR <2.0 and Platelets >50K  - Please maintain 2 large bore IV's (18g or larger).   - Please hold all NSAIDs and anticoagulants.            Thank you for your consult. I will follow-up with patient. Please contact us if you have any additional questions.    Robina Damian NP  Gastroenterology  [facility] - Cardiac [facility] ICU

## 2023-07-11 NOTE — ASSESSMENT & PLAN NOTE
58 F with alcoholic cirrhosis admitted with hemorrhagic shock. EGD 7/06 with oozing duodenal ulcers but nothing treatable, clip placed. Developed worsening hemodynamics and overt bleeding leading to transfusion of multiple blood products. Ultimately empirically embolized with IR on 7/07 near location of clip but no active bleeding seen on CTA. She remains intubated with ongoing dark bowel movements. No transfusion overnight.     Recommendations:  - Will proceed with EGD today for evaluation of possible cause of recurrent bleeding.  - CBC every 8 hours or as clinically indicated  - Continue IV PPI 40 mg BID  - Correct coagulopathy for INR <2.0 and Platelets >50K  - Please maintain 2 large bore IV's (18g or larger).   - Please hold all NSAIDs and anticoagulants.

## 2023-07-11 NOTE — TREATMENT PLAN
GI Post Procedure Treatment Plan  Procedure Performed: EGD      Impression:    - Normal esophagus.                          - Gastritis.                          - Non-bleeding duodenal ulcer with a clean ulcer                          base (Ranjith Class III).                          - Duodenal foreign body.                          - Normal second portion of the duodenum.                          - No specimens collected.     Recommendation:                                 - Observe patient in ICU for ongoing care.                          - Resume previous diet.                          - Continue present medications.                          - Perform an H. pylori serology.       Savanna Lambert MD  Gastroenterology, PGY-4

## 2023-07-11 NOTE — PROGRESS NOTES
[facility] - Card[facility] Medical [facility]  Critical Care Medicine  Progress Note    Patient Name: Marely Hamilton  MRN: [id number]  Admission Date: 7/5/2023  Hospital Length of Stay: 6 days  Code Status: DNR  Attending Provider: Cody Brothers MD  Primary Care Provider: Viktor Ross MD   Principal Problem: Gastrointestinal hemorrhage associated with duodenal ulcer    Subjective:     HPI:  Marely Hamilton is a 58 y.o. female with history of alcoholic cirrhosis, seizure disorder, DVT and IJ thrombus with recent admission for large retroperitoneal hemorrhage requiring IR embolization and IVC filter placement who presents for hematochezia. Onset this morning at [facility], alida blood per rectum per chart, sent to ED. Initially normotensive but then severe hypotension prompted initiation of levophed and intubation. Hgb 6.8, 2u pRBC given + 1u FFP ordered. Hgb 8.5 on 7/2. On levo and vaso currently. K 6.6 but renal function at baseline. Repeat pending. No prior EGDs on record.      Hospital/ICU Course:  Patient was seen at bedside, hematochezia still present post op by IR. Patient has required many transfusions of pRBCs and platelets due to ongoing down trending of Hgb. No clear source of ongoing hemorrhage by imaging. Patient has continued to require pressors to maintain MAP >65. Discussed with GI and IR regarding active bleeding post op, IR indicated there is not much else to do from their end bc they embolized a significant part of GDA. Pt is off of pressor requirement and not actively bleeding. GI plans to perform EGD to re-evaluate potential sources of bleeding. Patient will undergo SBT if procedure goes well with plans to extubate if tolerated.       Interval History/Significant Events: Pt to undergo EGD today to re-evaluate for potential sources of bleeding. Pt will undergo SBT with plans to extubate as tolerated.     Review of Systems   Unable to perform ROS: Patient unresponsive   Objective:     Vital Signs (Most Recent):  Temp:  99 °F (37.2 °C) (07/11/23 0710)  Pulse: 75 (07/11/23 1000)  Resp: 13 (07/11/23 1000)  BP: (!) 116/58 (07/11/23 0200)  SpO2: 100 % (07/11/23 1000) Vital Signs (24h Range):  Temp:  [97.6 °F (36.4 °C)-99 °F (37.2 °C)] 99 °F (37.2 °C)  Pulse:  [59-87] 75  Resp:  [10-25] 13  SpO2:  [97 %-100 %] 100 %  BP: ()/(51-59) 116/58  Arterial Line BP: (101-129)/(42-51) 111/43   Weight: 54 kg (119 lb)  Body mass index is 21.77 kg/m².      Intake/Output Summary (Last 24 hours) at 7/11/2023 1058  Last data filed at 7/11/2023 1000  Gross per 24 hour   Intake 1194.31 ml   Output 555 ml   Net 639.31 ml            Physical Exam  Constitutional:       Appearance: She is ill-appearing and toxic-appearing.      Interventions: She is intubated.   HENT:      Head: Normocephalic.   Eyes:      General: Scleral icterus present.   Neck:      Thyroid: No thyroid mass or thyroid tenderness.      Vascular: No carotid bruit or hepatojugular reflux.   Cardiovascular:      Rate and Rhythm: Normal rate.      Pulses: Decreased pulses.      Heart sounds: Heart sounds are distant.   Pulmonary:      Effort: She is intubated.   Chest:      Chest wall: No mass, lacerations or deformity.   Breasts:     Right: No swelling.      Left: No swelling.   Abdominal:      General: Abdomen is flat.      Palpations: Abdomen is rigid.      Hernia: No hernia is present.   Genitourinary:     Vagina: Normal.   Musculoskeletal:      Cervical back: Rigidity present.   Lymphadenopathy:      Cervical: No cervical adenopathy.   Skin:     Coloration: Skin is jaundiced.      Findings: Ecchymosis present.   Neurological:      Mental Status: She is unresponsive.          Vents:  Vent Mode: (S) Spont (07/11/23 0720)  Ventilator Initiated: Yes (07/05/23 1221)  Set Rate: 18 BPM (07/11/23 0720)  Vt Set: 300 mL (07/11/23 0720)  Pressure Support: 5 cmH20 (07/11/23 0720)  PEEP/CPAP: 5 cmH20 (07/11/23 0720)  Oxygen Concentration (%): 30 (07/11/23 1000)  Peak Airway Pressure: 10 cmH20  (07/11/23 0720)  Plateau Pressure: 0 cmH20 (07/11/23 0720)  Total Ve: 5.67 L/m (07/11/23 0720)  Negative Inspiratory Force (cm H2O): 0 (07/11/23 0720)  F/VT Ratio<105 (RSBI): (!) 65.87 (07/11/23 0720)  Lines/Drains/Airways       Central Venous Catheter Line  Duration             Percutaneous Central Line Insertion/Assessment - Triple Lumen  07/07/23 0232 Femoral Vein Right;Femoral Right 4 days              Drain  Duration                  NG/OG Tube 07/06/23 0323 Center mouth 5 days         Urethral Catheter 07/05/23 1332 Double-lumen 5 days         Fecal Incontinence  07/09/23 2300 1 day              Airway  Duration                  Airway - Non-Surgical 07/05/23 1220 Endotracheal Tube 5 days              Arterial Line  Duration             Arterial Line 07/07/23 0232 Left Radial 4 days              Peripheral Intravenous Line  Duration                  Peripheral IV - Single Lumen 07/06/23 0546 18 G Anterior;Right Upper Arm 5 days         Peripheral IV - Single Lumen 07/06/23 2202 18 G Anterior;Left Forearm 4 days                  Significant Labs:    CBC/Anemia Profile:  Recent Labs   Lab 07/10/23  0826 07/10/23  2035 07/11/23  0823   WBC 3.54* 3.18* 3.62*   HGB 7.6* 7.8* 8.3*   HCT 22.8* 24.5* 25.0*   PLT 48* 62* 67*   MCV 93 94 95   RDW 19.0* 20.2* 21.5*          Chemistries:  Recent Labs   Lab 07/10/23  0358 07/10/23  1653 07/10/23  2035 07/11/23  0309   * 149* 145 146*   K 3.8 3.2* 4.0 3.7   * 120* 118* 117*   CO2 22* 21* 23 22*   BUN 17 11 10 10   CREATININE 0.5 0.4* 0.4* 0.4*   CALCIUM 7.9* 7.9* 8.0* 7.7*   ALBUMIN 2.8* 2.7* 2.7* 2.6*   PROT 4.3* 4.4* 4.4* 4.2*   BILITOT 3.3* 2.6* 2.7* 2.7*   ALKPHOS 54* 55 60 65   ALT 10 10 10 10   AST 23 23 23 25   MG 2.1  --   --  1.9   PHOS 2.9 2.1* 2.0* 3.2         All pertinent labs within the past 24 hours have been reviewed.    Significant Imaging:  I have reviewed all pertinent imaging results/findings within the past 24  hours.      ABG  Recent Labs   Lab 07/09/23  1236   PH 7.387   PO2 191*   PCO2 37.2   HCO3 22.4*   BE -3     Assessment/Plan:     Oncology  Acute blood loss anemia  - Maintain IV access with 2 large bore Ivs  - Intravascular resuscitation/support with IVFs   - Hold all NSAIDs and anticoagulants, unless contraindicated.  - Please correct any coagulopathy with platelets and FFP for goal of platelets >50K and INR <2.0  - Please notify GI team if there is significant change in patient's clinical status  - To date, patient has required at least 21 units of pRBCs to maintain Hgb >7         Endocrine  Severe protein-calorie malnutrition  Nutrition consulted. Most recent weight and BMI monitored-     Measurements:  Wt Readings from Last 1 Encounters:   07/07/23 54 kg (119 lb)   Body mass index is 21.77 kg/m².    Patient has been screened and assessed by RD.    Malnutrition Type:  Context: social/environmental circumstances  Level: severe    Malnutrition Characteristic Summary:  Energy Intake (Malnutrition): less than or equal to 75% for greater than or equal to 1 month  Subcutaneous Fat (Malnutrition): severe depletion  Muscle Mass (Malnutrition): severe depletion    Interventions/Recommendations (treatment strategy):  1. When medically able, initiate TF regimen of Impact Peptide 1.5 @ goal rate of 45 mL/hr- provides 1620 kcals, 101 g pro, and 832 mL fluid. 2. If extubated and able to tolerate PO intake before next RD f/u, ADAT to regular- texture per SLP. 3. RD following.     Plan  -Initiating tube feeds with above formulation  -NPO at midnight     GI  * Gastrointestinal hemorrhage associated with duodenal ulcer  See above     Hematochezia  - GI EGD clipped duodenal ulcers for IR reference  - Transfuse blood products for active bleeding   - trend CBC and follow  - IR embolized GDA for duodenal hyperemia   -- GI to perform EGD to evaluate current condition of ulcers    Other  Retroperitoneal bleed  - CT-A demonstrated  stable retroperitoneal hematoma. No need for intervention at this time.       Critical Care Daily Checklist:    A: Awake: RASS Goal/Actual Goal: RASS Goal: 0-->alert and calm  Actual:     B: Spontaneous Breathing Trial Performed? Spon. Breathing Trial Initiated?: Initiated (07/11/23 0714)   C: SAT & SBT Coordinated?  SBT today                      D: Delirium: CAM-ICU Overall CAM-ICU: Positive   E: Early Mobility Performed? Yes   F: Feeding Goal: Goals: Will meet % EEN/EPN by next RD f/u.  Status: Nutrition Goal Status: new   Current Diet Order   Procedures    Diet NPO      AS: Analgesia/Sedation IV fentanyl   T: Thromboembolic Prophylaxis n/a   H: HOB > 300 Yes   U: Stress Ulcer Prophylaxis (if needed) PPI   G: Glucose Control SSI   B: Bowel Function Stool Occurrence: 1   I: Indwelling Catheter (Lines & Saldaña) Necessity Saldaña   D: De-escalation of Antimicrobials/Pharmacotherapies N/a    Plan for the day/ETD EGD, possible SBT    Code Status:  Family/Goals of Care: DNR  n/a       Critical secondary to Patient has a condition that poses threat to life and bodily function: Acute Renal Failure and Hemorrhagic shock       Critical care was time spent personally by me on the following activities: development of treatment plan with patient or surrogate and bedside caregivers, discussions with consultants, evaluation of patient's response to treatment, examination of patient, ordering and performing treatments and interventions, ordering and review of laboratory studies, ordering and review of radiographic studies, pulse oximetry, re-evaluation of patient's condition. This critical care time did not overlap with that of any other provider or involve time for any procedures.     Phong Jean,   Critical Care Medicine  [facility] - Cardiac Medical ICU

## 2023-07-12 LAB
ALBUMIN SERPL BCP-MCNC: 2.7 G/DL (ref 3.5–5.2)
ALBUMIN SERPL BCP-MCNC: 2.9 G/DL (ref 3.5–5.2)
ALP SERPL-CCNC: 67 U/L (ref 55–135)
ALP SERPL-CCNC: 69 U/L (ref 55–135)
ALT SERPL W/O P-5'-P-CCNC: 11 U/L (ref 10–44)
ALT SERPL W/O P-5'-P-CCNC: 14 U/L (ref 10–44)
ANION GAP SERPL CALC-SCNC: 10 MMOL/L (ref 8–16)
ANION GAP SERPL CALC-SCNC: 9 MMOL/L (ref 8–16)
APTT PPP: 28.3 SEC (ref 21–32)
AST SERPL-CCNC: 27 U/L (ref 10–40)
AST SERPL-CCNC: 30 U/L (ref 10–40)
BASOPHILS # BLD AUTO: 0.01 K/UL (ref 0–0.2)
BASOPHILS # BLD AUTO: 0.02 K/UL (ref 0–0.2)
BASOPHILS NFR BLD: 0.2 % (ref 0–1.9)
BASOPHILS NFR BLD: 0.3 % (ref 0–1.9)
BILIRUB SERPL-MCNC: 3.4 MG/DL (ref 0.1–1)
BILIRUB SERPL-MCNC: 3.4 MG/DL (ref 0.1–1)
BUN SERPL-MCNC: 8 MG/DL (ref 6–20)
BUN SERPL-MCNC: 8 MG/DL (ref 6–20)
CA-I BLDV-SCNC: 1.19 MMOL/L (ref 1.06–1.42)
CA-I BLDV-SCNC: 1.19 MMOL/L (ref 1.06–1.42)
CALCIUM SERPL-MCNC: 7.9 MG/DL (ref 8.7–10.5)
CALCIUM SERPL-MCNC: 7.9 MG/DL (ref 8.7–10.5)
CHLORIDE SERPL-SCNC: 117 MMOL/L (ref 95–110)
CHLORIDE SERPL-SCNC: 120 MMOL/L (ref 95–110)
CO2 SERPL-SCNC: 17 MMOL/L (ref 23–29)
CO2 SERPL-SCNC: 19 MMOL/L (ref 23–29)
CREAT SERPL-MCNC: 0.4 MG/DL (ref 0.5–1.4)
CREAT SERPL-MCNC: 0.4 MG/DL (ref 0.5–1.4)
DIFFERENTIAL METHOD: ABNORMAL
DIFFERENTIAL METHOD: ABNORMAL
EOSINOPHIL # BLD AUTO: 0 K/UL (ref 0–0.5)
EOSINOPHIL # BLD AUTO: 0.1 K/UL (ref 0–0.5)
EOSINOPHIL NFR BLD: 0.6 % (ref 0–8)
EOSINOPHIL NFR BLD: 0.7 % (ref 0–8)
ERYTHROCYTE [DISTWIDTH] IN BLOOD BY AUTOMATED COUNT: 22.5 % (ref 11.5–14.5)
ERYTHROCYTE [DISTWIDTH] IN BLOOD BY AUTOMATED COUNT: 22.7 % (ref 11.5–14.5)
EST. GFR  (NO RACE VARIABLE): >60 ML/MIN/1.73 M^2
EST. GFR  (NO RACE VARIABLE): >60 ML/MIN/1.73 M^2
GLUCOSE SERPL-MCNC: 76 MG/DL (ref 70–110)
GLUCOSE SERPL-MCNC: 89 MG/DL (ref 70–110)
H PYLORI IGG SERPL QL IA: POSITIVE
HCT VFR BLD AUTO: 27 % (ref 37–48.5)
HCT VFR BLD AUTO: 27.8 % (ref 37–48.5)
HGB BLD-MCNC: 8.7 G/DL (ref 12–16)
HGB BLD-MCNC: 8.8 G/DL (ref 12–16)
IMM GRANULOCYTES # BLD AUTO: 0.01 K/UL (ref 0–0.04)
IMM GRANULOCYTES # BLD AUTO: 0.03 K/UL (ref 0–0.04)
IMM GRANULOCYTES NFR BLD AUTO: 0.2 % (ref 0–0.5)
IMM GRANULOCYTES NFR BLD AUTO: 0.4 % (ref 0–0.5)
INR PPP: 1.6 (ref 0.8–1.2)
INR PPP: 1.6 (ref 0.8–1.2)
LYMPHOCYTES # BLD AUTO: 0.5 K/UL (ref 1–4.8)
LYMPHOCYTES # BLD AUTO: 0.7 K/UL (ref 1–4.8)
LYMPHOCYTES NFR BLD: 11.1 % (ref 18–48)
LYMPHOCYTES NFR BLD: 9.7 % (ref 18–48)
MAGNESIUM SERPL-MCNC: 2.2 MG/DL (ref 1.6–2.6)
MCH RBC QN AUTO: 30.6 PG (ref 27–31)
MCH RBC QN AUTO: 30.9 PG (ref 27–31)
MCHC RBC AUTO-ENTMCNC: 31.7 G/DL (ref 32–36)
MCHC RBC AUTO-ENTMCNC: 32.2 G/DL (ref 32–36)
MCV RBC AUTO: 96 FL (ref 82–98)
MCV RBC AUTO: 97 FL (ref 82–98)
MONOCYTES # BLD AUTO: 0.4 K/UL (ref 0.3–1)
MONOCYTES # BLD AUTO: 0.4 K/UL (ref 0.3–1)
MONOCYTES NFR BLD: 6 % (ref 4–15)
MONOCYTES NFR BLD: 7.2 % (ref 4–15)
NEUTROPHILS # BLD AUTO: 3.9 K/UL (ref 1.8–7.7)
NEUTROPHILS # BLD AUTO: 5.6 K/UL (ref 1.8–7.7)
NEUTROPHILS NFR BLD: 80.7 % (ref 38–73)
NEUTROPHILS NFR BLD: 82.9 % (ref 38–73)
NRBC BLD-RTO: 0 /100 WBC
NRBC BLD-RTO: 0 /100 WBC
PHOSPHATE SERPL-MCNC: 2.3 MG/DL (ref 2.7–4.5)
PHOSPHATE SERPL-MCNC: 2.9 MG/DL (ref 2.7–4.5)
PLATELET # BLD AUTO: 59 K/UL (ref 150–450)
PLATELET # BLD AUTO: 68 K/UL (ref 150–450)
PMV BLD AUTO: 10 FL (ref 9.2–12.9)
PMV BLD AUTO: 10.2 FL (ref 9.2–12.9)
POCT GLUCOSE: 112 MG/DL (ref 70–110)
POCT GLUCOSE: 74 MG/DL (ref 70–110)
POCT GLUCOSE: 88 MG/DL (ref 70–110)
POTASSIUM SERPL-SCNC: 3.9 MMOL/L (ref 3.5–5.1)
POTASSIUM SERPL-SCNC: 4 MMOL/L (ref 3.5–5.1)
PROT SERPL-MCNC: 4.7 G/DL (ref 6–8.4)
PROT SERPL-MCNC: 4.9 G/DL (ref 6–8.4)
PROTHROMBIN TIME: 16.3 SEC (ref 9–12.5)
PROTHROMBIN TIME: 17.1 SEC (ref 9–12.5)
RBC # BLD AUTO: 2.82 M/UL (ref 4–5.4)
RBC # BLD AUTO: 2.88 M/UL (ref 4–5.4)
SODIUM SERPL-SCNC: 145 MMOL/L (ref 136–145)
SODIUM SERPL-SCNC: 147 MMOL/L (ref 136–145)
WBC # BLD AUTO: 4.87 K/UL (ref 3.9–12.7)
WBC # BLD AUTO: 6.7 K/UL (ref 3.9–12.7)

## 2023-07-12 PROCEDURE — 63600175 PHARM REV CODE 636 W HCPCS: Performed by: STUDENT IN AN ORGANIZED HEALTH CARE EDUCATION/TRAINING PROGRAM

## 2023-07-12 PROCEDURE — 94761 N-INVAS EAR/PLS OXIMETRY MLT: CPT

## 2023-07-12 PROCEDURE — 27000221 HC OXYGEN, UP TO 24 HOURS

## 2023-07-12 PROCEDURE — 94668 MNPJ CHEST WALL SBSQ: CPT

## 2023-07-12 PROCEDURE — C9113 INJ PANTOPRAZOLE SODIUM, VIA: HCPCS | Performed by: STUDENT IN AN ORGANIZED HEALTH CARE EDUCATION/TRAINING PROGRAM

## 2023-07-12 PROCEDURE — 25000242 PHARM REV CODE 250 ALT 637 W/ HCPCS

## 2023-07-12 PROCEDURE — 99291 CRITICAL CARE FIRST HOUR: CPT | Mod: GC,,, | Performed by: INTERNAL MEDICINE

## 2023-07-12 PROCEDURE — 80053 COMPREHEN METABOLIC PANEL: CPT | Mod: 91

## 2023-07-12 PROCEDURE — 99232 SBSQ HOSP IP/OBS MODERATE 35: CPT | Mod: ,,,

## 2023-07-12 PROCEDURE — 94640 AIRWAY INHALATION TREATMENT: CPT

## 2023-07-12 PROCEDURE — 99900035 HC TECH TIME PER 15 MIN (STAT)

## 2023-07-12 PROCEDURE — 85610 PROTHROMBIN TIME: CPT

## 2023-07-12 PROCEDURE — 85730 THROMBOPLASTIN TIME PARTIAL: CPT

## 2023-07-12 PROCEDURE — 84100 ASSAY OF PHOSPHORUS: CPT | Mod: 91

## 2023-07-12 PROCEDURE — 82330 ASSAY OF CALCIUM: CPT | Mod: 91

## 2023-07-12 PROCEDURE — 25000003 PHARM REV CODE 250

## 2023-07-12 PROCEDURE — 86677 HELICOBACTER PYLORI ANTIBODY: CPT | Performed by: STUDENT IN AN ORGANIZED HEALTH CARE EDUCATION/TRAINING PROGRAM

## 2023-07-12 PROCEDURE — 27201109 HC SYSTEM FECAL MANAGEMENT

## 2023-07-12 PROCEDURE — 99291 PR CRITICAL CARE, E/M 30-74 MINUTES: ICD-10-PCS | Mod: GC,,, | Performed by: INTERNAL MEDICINE

## 2023-07-12 PROCEDURE — 99900026 HC AIRWAY MAINTENANCE (STAT)

## 2023-07-12 PROCEDURE — 20000000 HC ICU ROOM

## 2023-07-12 PROCEDURE — 25000003 PHARM REV CODE 250: Performed by: STUDENT IN AN ORGANIZED HEALTH CARE EDUCATION/TRAINING PROGRAM

## 2023-07-12 PROCEDURE — 83735 ASSAY OF MAGNESIUM: CPT

## 2023-07-12 PROCEDURE — 85025 COMPLETE CBC W/AUTO DIFF WBC: CPT

## 2023-07-12 PROCEDURE — 99232 PR SUBSEQUENT HOSPITAL CARE,LEVL II: ICD-10-PCS | Mod: ,,,

## 2023-07-12 PROCEDURE — 94660 CPAP INITIATION&MGMT: CPT

## 2023-07-12 RX ORDER — FUROSEMIDE 10 MG/ML
40 INJECTION INTRAMUSCULAR; INTRAVENOUS ONCE
Status: COMPLETED | OUTPATIENT
Start: 2023-07-12 | End: 2023-07-12

## 2023-07-12 RX ORDER — ACETYLCYSTEINE 200 MG/ML
2 SOLUTION ORAL; RESPIRATORY (INHALATION) 3 TIMES DAILY
Status: DISCONTINUED | OUTPATIENT
Start: 2023-07-12 | End: 2023-07-17

## 2023-07-12 RX ORDER — IPRATROPIUM BROMIDE AND ALBUTEROL SULFATE 2.5; .5 MG/3ML; MG/3ML
3 SOLUTION RESPIRATORY (INHALATION) EVERY 8 HOURS
Status: DISCONTINUED | OUTPATIENT
Start: 2023-07-12 | End: 2023-07-13

## 2023-07-12 RX ORDER — SODIUM CHLORIDE FOR INHALATION 3 %
4 VIAL, NEBULIZER (ML) INHALATION ONCE
Status: COMPLETED | OUTPATIENT
Start: 2023-07-12 | End: 2023-07-12

## 2023-07-12 RX ADMIN — SODIUM CHLORIDE SOLN NEBU 3% 4 ML: 3 NEBU SOLN at 12:07

## 2023-07-12 RX ADMIN — PANTOPRAZOLE SODIUM 40 MG: 40 INJECTION, POWDER, FOR SOLUTION INTRAVENOUS at 09:07

## 2023-07-12 RX ADMIN — ACETYLCYSTEINE 2 ML: 200 SOLUTION ORAL; RESPIRATORY (INHALATION) at 03:07

## 2023-07-12 RX ADMIN — IPRATROPIUM BROMIDE AND ALBUTEROL SULFATE 3 ML: .5; 3 SOLUTION RESPIRATORY (INHALATION) at 11:07

## 2023-07-12 RX ADMIN — DEXTROSE MONOHYDRATE 125 ML: 100 INJECTION, SOLUTION INTRAVENOUS at 03:07

## 2023-07-12 RX ADMIN — LEVETIRACETAM 1000 MG: 100 INJECTION, SOLUTION INTRAVENOUS at 08:07

## 2023-07-12 RX ADMIN — PANTOPRAZOLE SODIUM 40 MG: 40 INJECTION, POWDER, FOR SOLUTION INTRAVENOUS at 08:07

## 2023-07-12 RX ADMIN — LEVETIRACETAM 1000 MG: 100 INJECTION, SOLUTION INTRAVENOUS at 09:07

## 2023-07-12 RX ADMIN — ACETYLCYSTEINE 2 ML: 200 SOLUTION ORAL; RESPIRATORY (INHALATION) at 11:07

## 2023-07-12 RX ADMIN — IPRATROPIUM BROMIDE AND ALBUTEROL SULFATE 3 ML: .5; 3 SOLUTION RESPIRATORY (INHALATION) at 03:07

## 2023-07-12 RX ADMIN — SODIUM PHOSPHATE, MONOBASIC, MONOHYDRATE AND SODIUM PHOSPHATE, DIBASIC, ANHYDROUS 15 MMOL: 142; 276 INJECTION, SOLUTION INTRAVENOUS at 07:07

## 2023-07-12 RX ADMIN — FUROSEMIDE 40 MG: 10 INJECTION, SOLUTION INTRAMUSCULAR; INTRAVENOUS at 03:07

## 2023-07-12 RX ADMIN — IPRATROPIUM BROMIDE AND ALBUTEROL SULFATE 3 ML: .5; 3 SOLUTION RESPIRATORY (INHALATION) at 12:07

## 2023-07-12 NOTE — PLAN OF CARE
[facility]  Discharge Reassessment    Primary Care Provider: Viktor Ross MD    Expected Discharge Date: 7/17/2023    Patient not medically ready to discharge per MD.     dextrose 5 % and 0.45 % NaCl      NORepinephrine bitartrate-D5W Stopped (07/11/23 0427)         Reassessment (most recent)       Discharge Reassessment - 07/12/23 0940          Discharge Reassessment    Assessment Type Discharge Planning Reassessment     Did the patient's condition or plan change since previous assessment? Yes     Communicated BRAYAN with patient/caregiver Date not available/Unable to determine     Discharge Plan A Skilled Nursing Facility     Discharge Plan B Home with family;Home Health     DME Needed Upon Discharge  other (see comments)   TBD    Transition of Care Barriers None     Why the patient remains in the hospital Requires continued medical care        Post-Acute Status    Post-Acute Authorization Placement     Post-Acute Placement Status Referrals Sent     Coverage [employer] MANAGED MEDICARE - [employer] MEDICARE HMO     Discharge Delays None known at this time                   Lizz Lopez RN     [phone]

## 2023-07-12 NOTE — ASSESSMENT & PLAN NOTE
- GI EGD clipped duodenal ulcers for IR reference  - Transfuse blood products for active bleeding   - trend CBC and follow  - IR embolized GDA for duodenal hyperemia   -- GI re evaluated site of duodenal ulcers, no noted active bleeding  -- Continue PPI BID for 8 weeks, then once daily after that  -- Start diet asap, after speech evaluates patient

## 2023-07-12 NOTE — PT/OT/SLP PROGRESS
Physical Therapy      Patient Name:  Marely Hamilton   MRN:  [id number]    Patient not seen today secondary to  (pt on hold per RN due to increasing O2 requirements.). Will follow-up at a later date.  7/12/2023  .

## 2023-07-12 NOTE — PT/OT/SLP PROGRESS
Occupational Therapy      Patient Name:  Marely Hamilton   MRN:  [id number]    Patient not seen today secondary to Patient ill (Comment) (RN hold for increased O2 requirement). Will follow-up as able.    7/12/2023

## 2023-07-12 NOTE — PLAN OF CARE
MICU DAILY GOALS       A: Awake    RASS: Goal - RASS Goal: 0-->alert and calm  Actual - RASS (Pepper Agitation-Sedation Scale): alert and calm   Restraint necessity: Clinical Justification: Removing medical devices  B: Breathe   SBT: Not intubated   C: Coordinate A & B, analgesics/sedatives   Pain: managed    SAT: Not intubated  D: Delirium   CAM-ICU: Overall CAM-ICU: Positive  E: Early(intubated/ Progressive (non-intubated) Mobility   MOVE Screen: Pass   Activity: Activity Management: Rolling - L1  FAS: Feeding/Nutrition   Diet order: Diet/Nutrition Received: NPO,    T: Thrombus   DVT prophylaxis: VTE Required Core Measure: Per order contraindicated for SCDs/Anticoagulants  H: HOB Elevation   Head of Bed (HOB) Positioning: HOB elevated  U: Ulcer Prophylaxis   GI: yes  G: Glucose control   managed    S: Skin   Bathing/Skin Care: bath, partial, incontinence care, linen changed  Device Skin Pressure Protection: absorbent pad utilized/changed, adhesive use limited  Pressure Reduction Devices: specialty bed utilized  Pressure Reduction Techniques: weight shift assistance provided  Skin Protection: adhesive use limited, incontinence pads utilized, tubing/devices free from skin contact    B: Bowel Function   no issues   I: Indwelling Catheters   Saldaañ necessity:      Urethral Catheter 07/05/23 1332 Double-lumen-Reason for Continuing Urinary Catheterization: Critically ill in ICU and requiring hourly monitoring of intake/output   CVC necessity: Yes  D: De-escalation Antibiotics   Yes    Family/Goals of care/Code Status   Code Status: DNR    24H Vital Sign Range  Temp:  [98 °F (36.7 °C)-99 °F (37.2 °C)]   Pulse:  []   Resp:  [14-26]   SpO2:  [84 %-100 %]   Arterial Line BP: (121-149)/(43-59)      Shift Events   Unable to transition off of BiPAP this am due to pt desatting/increasing O2 requirements (40%->70%). CCT called, new orders placed. Chest xray completed- L sided opacification, pt positioned on right side.  Able to wean FiO2 back down to 30% throughout the day, with the repeat chest xray showing improvement. Pt more alert this afternoon with small verbal responses. TLC removed. SLP/OT/PT to follow up tomorrow.    VS and assessment per flow sheet, patient progressing towards goals as tolerated, plan of care reviewed with patient, all concerns addressed, will continue to monitor.    Britton Joseph

## 2023-07-12 NOTE — PROGRESS NOTES
Placed call to ext [phone] and spoke with CMICU Team 1 MD Ibarra regarding change in pt's SpO2 and interventions carried out by RT & RN. MD aware and arrives to bedside to assess the pt. No new orders received. Will continue to monitor closely. See below for VS details & oxygen devices.        07/12/23 0000 07/12/23 0021 07/12/23 0024   Vital Signs   Pulse 86 91 92   Heart Rate Source Monitor Monitor Monitor   Resp 16 17 17   SpO2 96 % (!) 90 % (!) 89 %   Pulse Oximetry Type  --   --   --    Oxygen Concentration (%)  --   --   --    Device (Oxygen Therapy)  --   --   --       07/12/23 0030 07/12/23 0034 07/12/23 0100   Vital Signs   Pulse 98 91 89   Heart Rate Source Monitor Monitor Monitor   Resp 19 16 18   SpO2 (!) 86 % (!) 93 % (!) 91 %   Pulse Oximetry Type  --  Continuous  --    Oxygen Concentration (%)  --  21  --    Device (Oxygen Therapy)  --  BIPAP  --       07/12/23 0111   Vital Signs   Pulse 97   Heart Rate Source Monitor   Resp 19   SpO2 (!) 84 %   Pulse Oximetry Type  --    Oxygen Concentration (%) (S)  30   Device (Oxygen Therapy) (S)  BIPAP

## 2023-07-12 NOTE — SUBJECTIVE & OBJECTIVE
Interval History/Significant Events: Pt extubated, on BIPAP due to de saturation. Chest physio therapy, PT, and speech eval today.     Review of Systems   Unable to perform ROS: Patient unresponsive   Objective:     Vital Signs (Most Recent):  Temp: 99 °F (37.2 °C) (07/12/23 0715)  Pulse: 84 (07/12/23 0900)  Resp: (!) 21 (07/12/23 0900)  BP: (!) 116/58 (07/11/23 0200)  SpO2: 98 % (07/12/23 0900) Vital Signs (24h Range):  Temp:  [98 °F (36.7 °C)-99 °F (37.2 °C)] 99 °F (37.2 °C)  Pulse:  [] 84  Resp:  [13-26] 21  SpO2:  [84 %-100 %] 98 %  Arterial Line BP: (121-149)/(43-57) 133/51   Weight: 54 kg (119 lb)  Body mass index is 21.77 kg/m².      Intake/Output Summary (Last 24 hours) at 7/12/2023 1140  Last data filed at 7/12/2023 0600  Gross per 24 hour   Intake 402.89 ml   Output 1030 ml   Net -627.11 ml            Physical Exam  Constitutional:       Appearance: She is ill-appearing and toxic-appearing.      Interventions: She is intubated.   HENT:      Head: Normocephalic.   Eyes:      General: Scleral icterus present.   Neck:      Thyroid: No thyroid mass or thyroid tenderness.      Vascular: No carotid bruit or hepatojugular reflux.   Cardiovascular:      Rate and Rhythm: Normal rate.      Pulses: Decreased pulses.      Heart sounds: Heart sounds are distant.   Pulmonary:      Effort: She is intubated.   Chest:      Chest wall: No mass, lacerations or deformity.   Breasts:     Right: No swelling.      Left: No swelling.   Abdominal:      General: Abdomen is flat.      Palpations: Abdomen is rigid.      Hernia: No hernia is present.   Genitourinary:     Vagina: Normal.   Musculoskeletal:      Cervical back: Rigidity present.   Lymphadenopathy:      Cervical: No cervical adenopathy.   Skin:     Coloration: Skin is jaundiced.      Findings: Ecchymosis present.   Neurological:      Mental Status: She is unresponsive.          Vents:  Vent Mode: (S) Spont (07/11/23 1154)  Ventilator Initiated: Yes (07/05/23  1221)  Set Rate: 18 BPM (07/11/23 1135)  Vt Set: 300 mL (07/11/23 1135)  Pressure Support: 5 cmH20 (07/11/23 1154)  PEEP/CPAP: 5 cmH20 (07/11/23 1154)  Oxygen Concentration (%): 60 (07/12/23 0900)  Peak Airway Pressure: 10 cmH20 (07/11/23 1154)  Plateau Pressure: 0 cmH20 (07/11/23 1154)  Total Ve: 6.51 L/m (07/11/23 1154)  Negative Inspiratory Force (cm H2O): 0 (07/11/23 1154)  F/VT Ratio<105 (RSBI): (!) 41.56 (07/11/23 1154)  Lines/Drains/Airways       Central Venous Catheter Line  Duration             Percutaneous Central Line Insertion/Assessment - Triple Lumen  07/07/23 0232 Femoral Vein Right;Femoral Right 5 days              Drain  Duration                  Urethral Catheter 07/05/23 1332 Double-lumen 6 days         Fecal Incontinence  07/09/23 2300 2 days              Arterial Line  Duration             Arterial Line 07/07/23 0232 Left Radial 5 days              Peripheral Intravenous Line  Duration                  Peripheral IV - Single Lumen 07/06/23 0546 18 G Anterior;Right Upper Arm 6 days                  Significant Labs:    CBC/Anemia Profile:  Recent Labs   Lab 07/11/23  0823 07/11/23  2116 07/12/23  0828   WBC 3.62* 3.14* 4.87   HGB 8.3* 8.2* 8.7*   HCT 25.0* 25.5* 27.0*   PLT 67* 60* 68*   MCV 95 96 96   RDW 21.5* 22.1* 22.5*          Chemistries:  Recent Labs   Lab 07/11/23  0309 07/11/23  1608 07/12/23  0315   * 143 145   K 3.7 3.9 4.0   * 118* 117*   CO2 22* 21* 19*   BUN 10 8 8   CREATININE 0.4* 0.4* 0.4*   CALCIUM 7.7* 7.9* 7.9*   ALBUMIN 2.6* 2.6* 2.9*   PROT 4.2* 4.4* 4.9*   BILITOT 2.7* 2.7* 3.4*   ALKPHOS 65 64 69   ALT 10 11 14   AST 25 27 30   MG 1.9  --  2.2   PHOS 3.2 2.5* 2.9         All pertinent labs within the past 24 hours have been reviewed.    Significant Imaging:  I have reviewed all pertinent imaging results/findings within the past 24 hours.

## 2023-07-12 NOTE — ASSESSMENT & PLAN NOTE
58 F with alcoholic cirrhosis admitted with hemorrhagic shock. EGD 7/06 with oozing duodenal ulcers but nothing treatable, clip placed. Developed worsening hemodynamics and overt bleeding leading to transfusion of multiple blood products. Ultimately empirically embolized with IR on 7/07 near location of clip but no active bleeding seen on CTA.  No transfusion overnight. Blood counts stable.    Recommendations:  - Please remove BMS at earliest feasible time  - Continue PPI 40 mg BID in whichever route is tolerated by patient for 8 weeks, then once daily  - Correct coagulopathy for INR <2.0 and Platelets >50K  - Please hold all NSAIDs and anticoagulants.

## 2023-07-12 NOTE — SUBJECTIVE & OBJECTIVE
Subjective:     Interval History: Followed up with patient s/p EGD 7/11 with nonbleeding duodenal ulcer noted. Continued bleeding and CTA completed. Empiric embolization on 7/7 at the location of the clips. Blood counts have remained stable. Stool output in greenish, brown. Hemodynamics improving. Extubated yesterday and on BIPAP. BMS in place with dark stool output noted.     Review of Systems   Unable to perform ROS: Severe respiratory distress   Objective:     Vital Signs (Most Recent):  Temp: 99 °F (37.2 °C) (07/12/23 0715)  Pulse: 96 (07/12/23 0823)  Resp: (!) 26 (07/12/23 0823)  BP: (!) 116/58 (07/11/23 0200)  SpO2: 95 % (07/12/23 0823) Vital Signs (24h Range):  Temp:  [98 °F (36.7 °C)-99 °F (37.2 °C)] 99 °F (37.2 °C)  Pulse:  [] 96  Resp:  [13-26] 26  SpO2:  [84 %-100 %] 95 %  Arterial Line BP: (111-149)/(43-57) 130/52     Weight: 54 kg (119 lb) (07/07/23 0400)  Body mass index is 21.77 kg/m².      Intake/Output Summary (Last 24 hours) at 7/12/2023 0917  Last data filed at 7/12/2023 0600  Gross per 24 hour   Intake 786.16 ml   Output 1030 ml   Net -243.84 ml         Lines/Drains/Airways       Central Venous Catheter Line  Duration             Percutaneous Central Line Insertion/Assessment - Triple Lumen  07/07/23 0232 Femoral Vein Right;Femoral Right 5 days              Drain  Duration                  Urethral Catheter 07/05/23 1332 Double-lumen 6 days         Fecal Incontinence  07/09/23 2300 2 days              Arterial Line  Duration             Arterial Line 07/07/23 0232 Left Radial 5 days              Peripheral Intravenous Line  Duration                  Peripheral IV - Single Lumen 07/06/23 0546 18 G Anterior;Right Upper Arm 6 days                     Physical Exam  Vitals and nursing note reviewed.   Constitutional:       Appearance: She is ill-appearing.   HENT:      Mouth/Throat:      Mouth: Mucous membranes are dry.      Comments: OG in place with bloody contents  Eyes:       General: Scleral icterus present.   Pulmonary:      Effort: Respiratory distress present.      Comments: BIPAP in place  Abdominal:      General: Abdomen is flat. Bowel sounds are normal. There is no distension.      Palpations: Abdomen is soft. There is no mass.      Tenderness: There is no abdominal tenderness.      Hernia: No hernia is present.   Musculoskeletal:         General: Swelling present.   Skin:     General: Skin is warm.      Coloration: Skin is jaundiced and pale.      Findings: Bruising present. No erythema.   Neurological:      General: No focal deficit present.      Mental Status: She is alert.      Comments: Intubated, sedated        Significant Labs:  CBC:   Recent Labs   Lab 07/10/23  2035 07/11/23  0823 07/11/23  2116   WBC 3.18* 3.62* 3.14*   HGB 7.8* 8.3* 8.2*   HCT 24.5* 25.0* 25.5*   PLT 62* 67* 60*       CMP:   Recent Labs   Lab 07/12/23  0315   GLU 76   CALCIUM 7.9*   ALBUMIN 2.9*   PROT 4.9*      K 4.0   CO2 19*   *   BUN 8   CREATININE 0.4*   ALKPHOS 69   ALT 14   AST 30   BILITOT 3.4*       Coagulation:   Recent Labs   Lab 07/12/23 0315 07/12/23  0828   INR  --  1.6*   APTT 28.3  --            Significant Imaging:  Imaging results within the past 24 hours have been reviewed.    EGD 7/6/2023  Impression:            - Esophageal ulcer with no bleeding and no                          stigmata of recent bleeding.                          - No esophageal varices.                          - Non bleeding gastric ulcer likely due to OG tube                          trauma.                          - Non-bleeding duodenal ulcers with no stigmata of                          bleeding. Small amount of oozing noted. Clip was                          placed for marking.                          - No specimens collected.   Recommendation:        - Return patient to ICU for ongoing care.                          - NPO.                          -Continue high dose IV PPI.                           - If significant overt GI bleeding occurs then                          consider STAT CTA abdomen and pelvis with bleeding                          protocol and IR consult for possible embolization                          vs empiric embolization by IR targeting the clip                          that was placed adjacent to the duodenal ulcers.   Attending Participation:        I personally performed the entire procedure.   Bernice Guillen MD   7/6/2023 7:52:51 PM     CTA GI Bleed 7/10/2023  Impression:     Interval gastroduodenal artery coil embolization.  Evaluation of the duodenum is somewhat limited secondary to beam hardening artifact however there is no definite evidence of active contrast extravasation to suggest active bleeding.     Stable large left retroperitoneal hematoma and smaller left iliacus hematoma.  No new retroperitoneal hematoma.     Cardiomegaly and reflux of contrast into the hepatic IVC, which can indicate right heart or valvular disease.     Cirrhotic liver morphology with stigmata of portal hypertension including splenomegaly, small volume ascites, and collateral vessels.     Severe anasarca, similar but worse from prior.     Small bilateral pleural effusions.     Additional stable findings above.     Electronically signed by resident: Amari Shabazz  Date:                                            07/10/2023  Time:                                           06:17    EGD 7/11/2023  Impression:            - Normal esophagus.                          - Gastritis.                          - Non-bleeding duodenal ulcer with a clean ulcer                          base (Ranjith Class III).                          - Duodenal foreign body.                          - Normal second portion of the duodenum.                          - No specimens collected.   Recommendation:        - Observe patient in ICU for ongoing care.                          - Resume previous diet.                           - Continue present medications.                          - Perform an H. pylori serology.   Attending Participation:        I was present and participated during the entire procedure from        insertion to removal of the endoscope.   Rangel Broussard MD   7/11/2023 11:46:12 AM

## 2023-07-12 NOTE — PT/OT/SLP PROGRESS
Speech Language Pathology      Marely Hamilton  MRN: [id number]    Patient not seen today secondary to continuous BiPap throughout AM 2/2 worsening respiratory status. Will follow-up for clinical swallow evaluation at a later date pending appropriateness.      7/12/2023

## 2023-07-12 NOTE — PROGRESS NOTES
[facility]  Gastroenterology  Progress Note    Patient Name: Marely Hamilton  MRN: [id number]  Admission Date: 7/5/2023  Hospital Length of Stay: 7 days  Code Status: DNR   Attending Provider: Cody Brothers MD  Consulting Provider: Robina Damian NP  Primary Care Physician: Viktor Ross MD  Principal Problem: Gastrointestinal hemorrhage associated with duodenal ulcer    Subjective:     Interval History: Followed up with patient s/p EGD 7/11 with nonbleeding duodenal ulcer noted. Continued bleeding and CTA completed. Empiric embolization on 7/7 at the location of the clips. Blood counts have remained stable. Stool output in greenish, brown. Hemodynamics improving. Extubated yesterday and on BIPAP. BMS in place with dark stool output noted.     Review of Systems   Unable to perform ROS: Severe respiratory distress   Objective:     Vital Signs (Most Recent):  Temp: 99 °F (37.2 °C) (07/12/23 0715)  Pulse: 96 (07/12/23 0823)  Resp: (!) 26 (07/12/23 0823)  BP: (!) 116/58 (07/11/23 0200)  SpO2: 95 % (07/12/23 0823) Vital Signs (24h Range):  Temp:  [98 °F (36.7 °C)-99 °F (37.2 °C)] 99 °F (37.2 °C)  Pulse:  [] 96  Resp:  [13-26] 26  SpO2:  [84 %-100 %] 95 %  Arterial Line BP: (111-149)/(43-57) 130/52     Weight: 54 kg (119 lb) (07/07/23 0400)  Body mass index is 21.77 kg/m².      Intake/Output Summary (Last 24 hours) at 7/12/2023 0917  Last data filed at 7/12/2023 0600  Gross per 24 hour   Intake 786.16 ml   Output 1030 ml   Net -243.84 ml         Lines/Drains/Airways       Central Venous Catheter Line  Duration             Percutaneous Central Line Insertion/Assessment - Triple Lumen  07/07/23 0232 Femoral Vein Right;Femoral Right 5 days              Drain  Duration                  Urethral Catheter 07/05/23 1332 Double-lumen 6 days         Fecal Incontinence  07/09/23 2300 2 days              Arterial Line  Duration             Arterial Line 07/07/23 0232 Left Radial 5 days               Peripheral Intravenous Line  Duration                  Peripheral IV - Single Lumen 07/06/23 0546 18 G Anterior;Right Upper Arm 6 days                     Physical Exam  Vitals and nursing note reviewed.   Constitutional:       Appearance: She is ill-appearing.   HENT:      Mouth/Throat:      Mouth: Mucous membranes are dry.      Comments: OG in place with bloody contents  Eyes:      General: Scleral icterus present.   Pulmonary:      Effort: Respiratory distress present.      Comments: BIPAP in place  Abdominal:      General: Abdomen is flat. Bowel sounds are normal. There is no distension.      Palpations: Abdomen is soft. There is no mass.      Tenderness: There is no abdominal tenderness.      Hernia: No hernia is present.   Musculoskeletal:         General: Swelling present.   Skin:     General: Skin is warm.      Coloration: Skin is jaundiced and pale.      Findings: Bruising present. No erythema.   Neurological:      General: No focal deficit present.      Mental Status: She is alert.      Comments: Intubated, sedated        Significant Labs:  CBC:   Recent Labs   Lab 07/10/23  2035 07/11/23  0823 07/11/23  2116   WBC 3.18* 3.62* 3.14*   HGB 7.8* 8.3* 8.2*   HCT 24.5* 25.0* 25.5*   PLT 62* 67* 60*       CMP:   Recent Labs   Lab 07/12/23  0315   GLU 76   CALCIUM 7.9*   ALBUMIN 2.9*   PROT 4.9*      K 4.0   CO2 19*   *   BUN 8   CREATININE 0.4*   ALKPHOS 69   ALT 14   AST 30   BILITOT 3.4*       Coagulation:   Recent Labs   Lab 07/12/23 0315 07/12/23  0828   INR  --  1.6*   APTT 28.3  --            Significant Imaging:  Imaging results within the past 24 hours have been reviewed.    EGD 7/6/2023  Impression:            - Esophageal ulcer with no bleeding and no                          stigmata of recent bleeding.                          - No esophageal varices.                          - Non bleeding gastric ulcer likely due to OG tube                          trauma.                          -  Non-bleeding duodenal ulcers with no stigmata of                          bleeding. Small amount of oozing noted. Clip was                          placed for marking.                          - No specimens collected.   Recommendation:        - Return patient to ICU for ongoing care.                          - NPO.                          -Continue high dose IV PPI.                          - If significant overt GI bleeding occurs then                          consider STAT CTA abdomen and pelvis with bleeding                          protocol and IR consult for possible embolization                          vs empiric embolization by IR targeting the clip                          that was placed adjacent to the duodenal ulcers.   Attending Participation:        I personally performed the entire procedure.   Bernice Guillen MD   7/6/2023 7:52:51 PM     CTA GI Bleed 7/10/2023  Impression:     Interval gastroduodenal artery coil embolization.  Evaluation of the duodenum is somewhat limited secondary to beam hardening artifact however there is no definite evidence of active contrast extravasation to suggest active bleeding.     Stable large left retroperitoneal hematoma and smaller left iliacus hematoma.  No new retroperitoneal hematoma.     Cardiomegaly and reflux of contrast into the hepatic IVC, which can indicate right heart or valvular disease.     Cirrhotic liver morphology with stigmata of portal hypertension including splenomegaly, small volume ascites, and collateral vessels.     Severe anasarca, similar but worse from prior.     Small bilateral pleural effusions.     Additional stable findings above.     Electronically signed by resident: Amari Shabazz  Date:                                            07/10/2023  Time:                                           06:17    EGD 7/11/2023  Impression:            - Normal esophagus.                          - Gastritis.                          -  Non-bleeding duodenal ulcer with a clean ulcer                          base (Ranjith Class III).                          - Duodenal foreign body.                          - Normal second portion of the duodenum.                          - No specimens collected.   Recommendation:        - Observe patient in ICU for ongoing care.                          - Resume previous diet.                          - Continue present medications.                          - Perform an H. pylori serology.   Attending Participation:        I was present and participated during the entire procedure from        insertion to removal of the endoscope.   Rangel Broussard MD   7/11/2023 11:46:12 AM     Assessment/Plan:     Oncology  Acute blood loss anemia  58 F with alcoholic cirrhosis admitted with hemorrhagic shock. EGD 7/06 with oozing duodenal ulcers but nothing treatable, clip placed. Developed worsening hemodynamics and overt bleeding leading to transfusion of multiple blood products. Ultimately empirically embolized with IR on 7/07 near location of clip but no active bleeding seen on CTA.  No transfusion overnight. Blood counts stable.    Recommendations:  - Please remove BMS at earliest feasible time  - Continue PPI 40 mg BID in whichever route is tolerated by patient for 8 weeks, then once daily  - Correct coagulopathy for INR <2.0 and Platelets >50K  - Please hold all NSAIDs and anticoagulants.            Thank you for your consult. I will sign off. Please contact us if you have any additional questions.    Robina Damian NP  Gastroenterology  [facility]

## 2023-07-12 NOTE — PROGRESS NOTES
[facility]  Critical Care Medicine  Progress Note    Patient Name: Marely Hamilton  MRN: [id number]  Admission Date: 7/5/2023  Hospital Length of Stay: 7 days  Code Status: DNR  Attending Provider: Cody Brothers MD  Primary Care Provider: Viktor Ross MD   Principal Problem: Gastrointestinal hemorrhage associated with duodenal ulcer    Subjective:     HPI:  Marely Hamilton is a 58 y.o. female with history of alcoholic cirrhosis, seizure disorder, DVT and IJ thrombus with recent admission for large retroperitoneal hemorrhage requiring IR embolization and IVC filter placement who presents for hematochezia. Onset this morning at [facility], alida blood per rectum per chart, sent to ED. Initially normotensive but then severe hypotension prompted initiation of levophed and intubation. Hgb 6.8, 2u pRBC given + 1u FFP ordered. Hgb 8.5 on 7/2. On levo and vaso currently. K 6.6 but renal function at baseline. Repeat pending. No prior EGDs on record.      Hospital/ICU Course:  Patient was seen at bedside, hematochezia still present post op by IR. Patient has required many transfusions of pRBCs and platelets due to ongoing down trending of Hgb. No clear source of ongoing hemorrhage by imaging. Patient has continued to require pressors to maintain MAP >65. Discussed with GI and IR regarding active bleeding post op, IR indicated there is not much else to do from their end bc they embolized a significant part of GDA. Pt is off of pressor requirement and not actively bleeding. GI re evaluated pt via EGD and found no sources of active bleeding. Pt is extubated and currently on BIPAP requirement due to de saturation in the 80's. Pt is to be seen by speech and PT/OT. Clear mucinous secretions via suction, chest physiotherapy, and cough assist device. Nebs to help with breathing as well.       Interval History/Significant Events: Pt extubated, on BIPAP due to de saturation. Chest physio therapy, PT, and speech eval today.      Review of Systems   Unable to perform ROS: Patient unresponsive   Objective:     Vital Signs (Most Recent):  Temp: 99 °F (37.2 °C) (07/12/23 0715)  Pulse: 84 (07/12/23 0900)  Resp: (!) 21 (07/12/23 0900)  BP: (!) 116/58 (07/11/23 0200)  SpO2: 98 % (07/12/23 0900) Vital Signs (24h Range):  Temp:  [98 °F (36.7 °C)-99 °F (37.2 °C)] 99 °F (37.2 °C)  Pulse:  [] 84  Resp:  [13-26] 21  SpO2:  [84 %-100 %] 98 %  Arterial Line BP: (121-149)/(43-57) 133/51   Weight: 54 kg (119 lb)  Body mass index is 21.77 kg/m².      Intake/Output Summary (Last 24 hours) at 7/12/2023 1140  Last data filed at 7/12/2023 0600  Gross per 24 hour   Intake 402.89 ml   Output 1030 ml   Net -627.11 ml            Physical Exam  Constitutional:       Appearance: She is ill-appearing and toxic-appearing.      Interventions: She is intubated.   HENT:      Head: Normocephalic.   Eyes:      General: Scleral icterus present.   Neck:      Thyroid: No thyroid mass or thyroid tenderness.      Vascular: No carotid bruit or hepatojugular reflux.   Cardiovascular:      Rate and Rhythm: Normal rate.      Pulses: Decreased pulses.      Heart sounds: Heart sounds are distant.   Pulmonary:      Effort: She is intubated.   Chest:      Chest wall: No mass, lacerations or deformity.   Breasts:     Right: No swelling.      Left: No swelling.   Abdominal:      General: Abdomen is flat.      Palpations: Abdomen is rigid.      Hernia: No hernia is present.   Genitourinary:     Vagina: Normal.   Musculoskeletal:      Cervical back: Rigidity present.   Lymphadenopathy:      Cervical: No cervical adenopathy.   Skin:     Coloration: Skin is jaundiced.      Findings: Ecchymosis present.   Neurological:      Mental Status: She is unresponsive.          Vents:  Vent Mode: (S) Spont (07/11/23 1154)  Ventilator Initiated: Yes (07/05/23 1221)  Set Rate: 18 BPM (07/11/23 1135)  Vt Set: 300 mL (07/11/23 1135)  Pressure Support: 5 cmH20 (07/11/23 1154)  PEEP/CPAP: 5 cmH20  (07/11/23 1154)  Oxygen Concentration (%): 60 (07/12/23 0900)  Peak Airway Pressure: 10 cmH20 (07/11/23 1154)  Plateau Pressure: 0 cmH20 (07/11/23 1154)  Total Ve: 6.51 L/m (07/11/23 1154)  Negative Inspiratory Force (cm H2O): 0 (07/11/23 1154)  F/VT Ratio<105 (RSBI): (!) 41.56 (07/11/23 1154)  Lines/Drains/Airways       Central Venous Catheter Line  Duration             Percutaneous Central Line Insertion/Assessment - Triple Lumen  07/07/23 0232 Femoral Vein Right;Femoral Right 5 days              Drain  Duration                  Urethral Catheter 07/05/23 1332 Double-lumen 6 days         Fecal Incontinence  07/09/23 2300 2 days              Arterial Line  Duration             Arterial Line 07/07/23 0232 Left Radial 5 days              Peripheral Intravenous Line  Duration                  Peripheral IV - Single Lumen 07/06/23 0546 18 G Anterior;Right Upper Arm 6 days                  Significant Labs:    CBC/Anemia Profile:  Recent Labs   Lab 07/11/23  0823 07/11/23  2116 07/12/23  0828   WBC 3.62* 3.14* 4.87   HGB 8.3* 8.2* 8.7*   HCT 25.0* 25.5* 27.0*   PLT 67* 60* 68*   MCV 95 96 96   RDW 21.5* 22.1* 22.5*          Chemistries:  Recent Labs   Lab 07/11/23  0309 07/11/23  1608 07/12/23  0315   * 143 145   K 3.7 3.9 4.0   * 118* 117*   CO2 22* 21* 19*   BUN 10 8 8   CREATININE 0.4* 0.4* 0.4*   CALCIUM 7.7* 7.9* 7.9*   ALBUMIN 2.6* 2.6* 2.9*   PROT 4.2* 4.4* 4.9*   BILITOT 2.7* 2.7* 3.4*   ALKPHOS 65 64 69   ALT 10 11 14   AST 25 27 30   MG 1.9  --  2.2   PHOS 3.2 2.5* 2.9         All pertinent labs within the past 24 hours have been reviewed.    Significant Imaging:  I have reviewed all pertinent imaging results/findings within the past 24 hours.      ABG  Recent Labs   Lab 07/09/23  1236   PH 7.387   PO2 191*   PCO2 37.2   HCO3 22.4*   BE -3     Assessment/Plan:     Oncology  Acute blood loss anemia  - Maintain IV access with 2 large bore Ivs  - Intravascular resuscitation/support with IVFs    - Hold all NSAIDs and anticoagulants, unless contraindicated.  - Please correct any coagulopathy with platelets and FFP for goal of platelets >50K and INR <2.0  - Please notify GI team if there is significant change in patient's clinical status  - To date, patient has required at least 21 units of pRBCs to maintain Hgb >7         Endocrine  Severe protein-calorie malnutrition  Nutrition consulted. Most recent weight and BMI monitored-     Measurements:  Wt Readings from Last 1 Encounters:   07/07/23 54 kg (119 lb)   Body mass index is 21.77 kg/m².    Patient has been screened and assessed by RD.    Malnutrition Type:  Context: social/environmental circumstances  Level: severe    Malnutrition Characteristic Summary:  Energy Intake (Malnutrition): less than or equal to 75% for greater than or equal to 1 month  Subcutaneous Fat (Malnutrition): severe depletion  Muscle Mass (Malnutrition): severe depletion    Interventions/Recommendations (treatment strategy):  1. When medically able, initiate TF regimen of Impact Peptide 1.5 @ goal rate of 45 mL/hr- provides 1620 kcals, 101 g pro, and 832 mL fluid. 2. If extubated and able to tolerate PO intake before next RD f/u, ADAT to regular- texture per SLP. 3. RD following.     Plan  -Initiating tube feeds with above formulation  -NPO at midnight     GI  * Gastrointestinal hemorrhage associated with duodenal ulcer  See above     Hematochezia  - GI EGD clipped duodenal ulcers for IR reference  - Transfuse blood products for active bleeding   - trend CBC and follow  - IR embolized GDA for duodenal hyperemia   -- GI re evaluated site of duodenal ulcers, no noted active bleeding  -- Continue PPI BID for 8 weeks, then once daily after that  -- Start diet asap, after speech evaluates patient     Other  Retroperitoneal bleed  - CT-A demonstrated stable retroperitoneal hematoma. No need for intervention at this time.       Critical Care Daily Checklist:    A: Awake: RASS Goal/Actual  Goal: RASS Goal: 0-->alert and calm  Actual:     B: Spontaneous Breathing Trial Performed? Spon. Breathing Trial Initiated?: Initiated (07/11/23 2720)   C: SAT & SBT Coordinated?  Extubated                      D: Delirium: CAM-ICU Overall CAM-ICU: Positive   E: Early Mobility Performed? Yes   F: Feeding Goal: Goals: Will meet % EEN/EPN by next RD f/u.  Status: Nutrition Goal Status: new   Current Diet Order   Procedures    Diet NPO      AS: Analgesia/Sedation n/a   T: Thromboembolic Prophylaxis n/a   H: HOB > 300 Yes   U: Stress Ulcer Prophylaxis (if needed) PPI   G: Glucose Control SSI   B: Bowel Function Stool Occurrence: 1   I: Indwelling Catheter (Lines & Saldaña) Necessity Saldaña   D: De-escalation of Antimicrobials/Pharmacotherapies n/a    Plan for the day/ETD Chest physiotherapy, speech, PT eval    Code Status:  Family/Goals of Care: DNR  n/a       Critical secondary to Patient has a condition that poses threat to life and bodily function: Hemorraghic shock       Critical care was time spent personally by me on the following activities: development of treatment plan with patient or surrogate and bedside caregivers, discussions with consultants, evaluation of patient's response to treatment, examination of patient, ordering and performing treatments and interventions, ordering and review of laboratory studies, ordering and review of radiographic studies, pulse oximetry, re-evaluation of patient's condition. This critical care time did not overlap with that of any other provider or involve time for any procedures.     Phong Jean,   Critical Care Medicine  [facility] - Cardiac Medical ICU

## 2023-07-13 PROBLEM — A04.8 H. PYLORI INFECTION: Status: ACTIVE | Noted: 2023-07-13

## 2023-07-13 LAB
ALBUMIN SERPL BCP-MCNC: 2.6 G/DL (ref 3.5–5.2)
ALBUMIN SERPL BCP-MCNC: 2.7 G/DL (ref 3.5–5.2)
ALLENS TEST: ABNORMAL
ALP SERPL-CCNC: 71 U/L (ref 55–135)
ALP SERPL-CCNC: 75 U/L (ref 55–135)
ALT SERPL W/O P-5'-P-CCNC: 11 U/L (ref 10–44)
ALT SERPL W/O P-5'-P-CCNC: 11 U/L (ref 10–44)
ANION GAP SERPL CALC-SCNC: 8 MMOL/L (ref 8–16)
ANION GAP SERPL CALC-SCNC: 9 MMOL/L (ref 8–16)
APTT PPP: 30.7 SEC (ref 21–32)
AST SERPL-CCNC: 25 U/L (ref 10–40)
AST SERPL-CCNC: 28 U/L (ref 10–40)
BASOPHILS # BLD AUTO: 0.01 K/UL (ref 0–0.2)
BASOPHILS # BLD AUTO: 0.01 K/UL (ref 0–0.2)
BASOPHILS NFR BLD: 0.2 % (ref 0–1.9)
BASOPHILS NFR BLD: 0.2 % (ref 0–1.9)
BILIRUB SERPL-MCNC: 3.5 MG/DL (ref 0.1–1)
BILIRUB SERPL-MCNC: 3.7 MG/DL (ref 0.1–1)
BUN SERPL-MCNC: 9 MG/DL (ref 6–20)
BUN SERPL-MCNC: 9 MG/DL (ref 6–20)
CA-I BLDV-SCNC: 1.18 MMOL/L (ref 1.06–1.42)
CA-I BLDV-SCNC: 1.21 MMOL/L (ref 1.06–1.42)
CALCIUM SERPL-MCNC: 8.1 MG/DL (ref 8.7–10.5)
CALCIUM SERPL-MCNC: 8.1 MG/DL (ref 8.7–10.5)
CHLORIDE SERPL-SCNC: 116 MMOL/L (ref 95–110)
CHLORIDE SERPL-SCNC: 116 MMOL/L (ref 95–110)
CO2 SERPL-SCNC: 19 MMOL/L (ref 23–29)
CO2 SERPL-SCNC: 20 MMOL/L (ref 23–29)
CREAT SERPL-MCNC: 0.4 MG/DL (ref 0.5–1.4)
CREAT SERPL-MCNC: 0.4 MG/DL (ref 0.5–1.4)
DELSYS: ABNORMAL
DIFFERENTIAL METHOD: ABNORMAL
DIFFERENTIAL METHOD: ABNORMAL
EOSINOPHIL # BLD AUTO: 0.1 K/UL (ref 0–0.5)
EOSINOPHIL # BLD AUTO: 0.1 K/UL (ref 0–0.5)
EOSINOPHIL NFR BLD: 1 % (ref 0–8)
EOSINOPHIL NFR BLD: 1.6 % (ref 0–8)
ERYTHROCYTE [DISTWIDTH] IN BLOOD BY AUTOMATED COUNT: 23.5 % (ref 11.5–14.5)
ERYTHROCYTE [DISTWIDTH] IN BLOOD BY AUTOMATED COUNT: 24.3 % (ref 11.5–14.5)
EST. GFR  (NO RACE VARIABLE): >60 ML/MIN/1.73 M^2
EST. GFR  (NO RACE VARIABLE): >60 ML/MIN/1.73 M^2
GLUCOSE SERPL-MCNC: 124 MG/DL (ref 70–110)
GLUCOSE SERPL-MCNC: 94 MG/DL (ref 70–110)
HCO3 UR-SCNC: 22.9 MMOL/L (ref 24–28)
HCT VFR BLD AUTO: 25.8 % (ref 37–48.5)
HCT VFR BLD AUTO: 27.7 % (ref 37–48.5)
HGB BLD-MCNC: 8.1 G/DL (ref 12–16)
HGB BLD-MCNC: 8.7 G/DL (ref 12–16)
IMM GRANULOCYTES # BLD AUTO: 0.02 K/UL (ref 0–0.04)
IMM GRANULOCYTES # BLD AUTO: 0.03 K/UL (ref 0–0.04)
IMM GRANULOCYTES NFR BLD AUTO: 0.4 % (ref 0–0.5)
IMM GRANULOCYTES NFR BLD AUTO: 0.6 % (ref 0–0.5)
INR PPP: 1.6 (ref 0.8–1.2)
INR PPP: 1.7 (ref 0.8–1.2)
LYMPHOCYTES # BLD AUTO: 0.4 K/UL (ref 1–4.8)
LYMPHOCYTES # BLD AUTO: 0.5 K/UL (ref 1–4.8)
LYMPHOCYTES NFR BLD: 11 % (ref 18–48)
LYMPHOCYTES NFR BLD: 7.5 % (ref 18–48)
MAGNESIUM SERPL-MCNC: 1.8 MG/DL (ref 1.6–2.6)
MCH RBC QN AUTO: 30.7 PG (ref 27–31)
MCH RBC QN AUTO: 31.2 PG (ref 27–31)
MCHC RBC AUTO-ENTMCNC: 31.4 G/DL (ref 32–36)
MCHC RBC AUTO-ENTMCNC: 31.4 G/DL (ref 32–36)
MCV RBC AUTO: 98 FL (ref 82–98)
MCV RBC AUTO: 99 FL (ref 82–98)
MONOCYTES # BLD AUTO: 0.3 K/UL (ref 0.3–1)
MONOCYTES # BLD AUTO: 0.4 K/UL (ref 0.3–1)
MONOCYTES NFR BLD: 5 % (ref 4–15)
MONOCYTES NFR BLD: 7.7 % (ref 4–15)
NEUTROPHILS # BLD AUTO: 3.9 K/UL (ref 1.8–7.7)
NEUTROPHILS # BLD AUTO: 4.4 K/UL (ref 1.8–7.7)
NEUTROPHILS NFR BLD: 79.1 % (ref 38–73)
NEUTROPHILS NFR BLD: 85.7 % (ref 38–73)
NRBC BLD-RTO: 0 /100 WBC
NRBC BLD-RTO: 0 /100 WBC
PCO2 BLDA: 34.5 MMHG (ref 35–45)
PH SMN: 7.43 [PH] (ref 7.35–7.45)
PHOSPHATE SERPL-MCNC: 1.8 MG/DL (ref 2.7–4.5)
PHOSPHATE SERPL-MCNC: 2.8 MG/DL (ref 2.7–4.5)
PLATELET # BLD AUTO: 57 K/UL (ref 150–450)
PLATELET # BLD AUTO: 65 K/UL (ref 150–450)
PMV BLD AUTO: 10.2 FL (ref 9.2–12.9)
PMV BLD AUTO: 11 FL (ref 9.2–12.9)
PO2 BLDA: 150 MMHG (ref 80–100)
POC BE: -1 MMOL/L
POC SATURATED O2: 99 % (ref 95–100)
POC TCO2: 24 MMOL/L (ref 23–27)
POCT GLUCOSE: 113 MG/DL (ref 70–110)
POCT GLUCOSE: 121 MG/DL (ref 70–110)
POCT GLUCOSE: 124 MG/DL (ref 70–110)
POCT GLUCOSE: 81 MG/DL (ref 70–110)
POCT GLUCOSE: 95 MG/DL (ref 70–110)
POTASSIUM SERPL-SCNC: 3.5 MMOL/L (ref 3.5–5.1)
POTASSIUM SERPL-SCNC: 3.8 MMOL/L (ref 3.5–5.1)
PROT SERPL-MCNC: 4.8 G/DL (ref 6–8.4)
PROT SERPL-MCNC: 4.9 G/DL (ref 6–8.4)
PROTHROMBIN TIME: 16.7 SEC (ref 9–12.5)
PROTHROMBIN TIME: 18 SEC (ref 9–12.5)
RBC # BLD AUTO: 2.6 M/UL (ref 4–5.4)
RBC # BLD AUTO: 2.83 M/UL (ref 4–5.4)
SAMPLE: ABNORMAL
SITE: ABNORMAL
SODIUM SERPL-SCNC: 144 MMOL/L (ref 136–145)
SODIUM SERPL-SCNC: 144 MMOL/L (ref 136–145)
WBC # BLD AUTO: 4.92 K/UL (ref 3.9–12.7)
WBC # BLD AUTO: 5.18 K/UL (ref 3.9–12.7)

## 2023-07-13 PROCEDURE — 25000003 PHARM REV CODE 250

## 2023-07-13 PROCEDURE — 25000242 PHARM REV CODE 250 ALT 637 W/ HCPCS

## 2023-07-13 PROCEDURE — 93005 ELECTROCARDIOGRAM TRACING: CPT

## 2023-07-13 PROCEDURE — 63600175 PHARM REV CODE 636 W HCPCS: Performed by: STUDENT IN AN ORGANIZED HEALTH CARE EDUCATION/TRAINING PROGRAM

## 2023-07-13 PROCEDURE — 94660 CPAP INITIATION&MGMT: CPT

## 2023-07-13 PROCEDURE — 82803 BLOOD GASES ANY COMBINATION: CPT

## 2023-07-13 PROCEDURE — 82800 BLOOD PH: CPT

## 2023-07-13 PROCEDURE — 93010 ELECTROCARDIOGRAM REPORT: CPT | Mod: ,,, | Performed by: INTERNAL MEDICINE

## 2023-07-13 PROCEDURE — 94640 AIRWAY INHALATION TREATMENT: CPT

## 2023-07-13 PROCEDURE — 94761 N-INVAS EAR/PLS OXIMETRY MLT: CPT

## 2023-07-13 PROCEDURE — 85025 COMPLETE CBC W/AUTO DIFF WBC: CPT

## 2023-07-13 PROCEDURE — 37799 UNLISTED PX VASCULAR SURGERY: CPT

## 2023-07-13 PROCEDURE — C9113 INJ PANTOPRAZOLE SODIUM, VIA: HCPCS | Performed by: STUDENT IN AN ORGANIZED HEALTH CARE EDUCATION/TRAINING PROGRAM

## 2023-07-13 PROCEDURE — 80053 COMPREHEN METABOLIC PANEL: CPT

## 2023-07-13 PROCEDURE — 93010 EKG 12-LEAD: ICD-10-PCS | Mod: ,,, | Performed by: INTERNAL MEDICINE

## 2023-07-13 PROCEDURE — 63600175 PHARM REV CODE 636 W HCPCS: Performed by: INTERNAL MEDICINE

## 2023-07-13 PROCEDURE — 85610 PROTHROMBIN TIME: CPT | Mod: 91

## 2023-07-13 PROCEDURE — 82330 ASSAY OF CALCIUM: CPT

## 2023-07-13 PROCEDURE — 97535 SELF CARE MNGMENT TRAINING: CPT

## 2023-07-13 PROCEDURE — 84100 ASSAY OF PHOSPHORUS: CPT

## 2023-07-13 PROCEDURE — 85730 THROMBOPLASTIN TIME PARTIAL: CPT

## 2023-07-13 PROCEDURE — 85347 COAGULATION TIME ACTIVATED: CPT

## 2023-07-13 PROCEDURE — 27000221 HC OXYGEN, UP TO 24 HOURS

## 2023-07-13 PROCEDURE — 27100171 HC OXYGEN HIGH FLOW UP TO 24 HOURS

## 2023-07-13 PROCEDURE — 20000000 HC ICU ROOM

## 2023-07-13 PROCEDURE — 99900035 HC TECH TIME PER 15 MIN (STAT)

## 2023-07-13 PROCEDURE — 99291 PR CRITICAL CARE, E/M 30-74 MINUTES: ICD-10-PCS | Mod: GC,,, | Performed by: INTERNAL MEDICINE

## 2023-07-13 PROCEDURE — 27000200 HC HIGH FLOW DEL DISP CIRCUIT

## 2023-07-13 PROCEDURE — 99291 CRITICAL CARE FIRST HOUR: CPT | Mod: GC,,, | Performed by: INTERNAL MEDICINE

## 2023-07-13 PROCEDURE — 25000242 PHARM REV CODE 250 ALT 637 W/ HCPCS: Performed by: INTERNAL MEDICINE

## 2023-07-13 PROCEDURE — 83735 ASSAY OF MAGNESIUM: CPT

## 2023-07-13 PROCEDURE — 92610 EVALUATE SWALLOWING FUNCTION: CPT

## 2023-07-13 RX ORDER — AMOXICILLIN 500 MG/1
1000 CAPSULE ORAL EVERY 12 HOURS
Status: COMPLETED | OUTPATIENT
Start: 2023-07-13 | End: 2023-07-26

## 2023-07-13 RX ORDER — POTASSIUM CHLORIDE 7.45 MG/ML
10 INJECTION INTRAVENOUS
Status: DISPENSED | OUTPATIENT
Start: 2023-07-13 | End: 2023-07-13

## 2023-07-13 RX ORDER — AMOXICILLIN 500 MG/1
1000 CAPSULE ORAL EVERY 12 HOURS
Status: DISCONTINUED | OUTPATIENT
Start: 2023-07-13 | End: 2023-07-13

## 2023-07-13 RX ORDER — POTASSIUM CHLORIDE 7.45 MG/ML
10 INJECTION INTRAVENOUS
Status: COMPLETED | OUTPATIENT
Start: 2023-07-13 | End: 2023-07-13

## 2023-07-13 RX ORDER — CLARITHROMYCIN 500 MG/1
500 TABLET, FILM COATED ORAL EVERY 12 HOURS
Status: DISCONTINUED | OUTPATIENT
Start: 2023-07-13 | End: 2023-07-13

## 2023-07-13 RX ORDER — MAGNESIUM SULFATE HEPTAHYDRATE 40 MG/ML
2 INJECTION, SOLUTION INTRAVENOUS ONCE
Status: COMPLETED | OUTPATIENT
Start: 2023-07-13 | End: 2023-07-13

## 2023-07-13 RX ORDER — CLARITHROMYCIN 500 MG/1
500 TABLET, FILM COATED ORAL EVERY 12 HOURS
Status: COMPLETED | OUTPATIENT
Start: 2023-07-13 | End: 2023-07-26

## 2023-07-13 RX ORDER — IPRATROPIUM BROMIDE AND ALBUTEROL SULFATE 2.5; .5 MG/3ML; MG/3ML
3 SOLUTION RESPIRATORY (INHALATION) 3 TIMES DAILY
Status: DISCONTINUED | OUTPATIENT
Start: 2023-07-13 | End: 2023-07-17

## 2023-07-13 RX ORDER — SODIUM,POTASSIUM PHOSPHATES 280-250MG
2 POWDER IN PACKET (EA) ORAL
Status: COMPLETED | OUTPATIENT
Start: 2023-07-13 | End: 2023-07-14

## 2023-07-13 RX ADMIN — AMOXICILLIN 1000 MG: 500 CAPSULE ORAL at 08:07

## 2023-07-13 RX ADMIN — AMOXICILLIN 1000 MG: 500 CAPSULE ORAL at 04:07

## 2023-07-13 RX ADMIN — CLARITHROMYCIN 500 MG: 500 TABLET, FILM COATED ORAL at 08:07

## 2023-07-13 RX ADMIN — CLARITHROMYCIN 500 MG: 500 TABLET, FILM COATED ORAL at 04:07

## 2023-07-13 RX ADMIN — POTASSIUM CHLORIDE 10 MEQ: 7.46 INJECTION, SOLUTION INTRAVENOUS at 08:07

## 2023-07-13 RX ADMIN — PANTOPRAZOLE SODIUM 40 MG: 40 INJECTION, POWDER, FOR SOLUTION INTRAVENOUS at 08:07

## 2023-07-13 RX ADMIN — POTASSIUM & SODIUM PHOSPHATES POWDER PACK 280-160-250 MG 2 PACKET: 280-160-250 PACK at 08:07

## 2023-07-13 RX ADMIN — ACETYLCYSTEINE 2 ML: 200 SOLUTION ORAL; RESPIRATORY (INHALATION) at 03:07

## 2023-07-13 RX ADMIN — LEVETIRACETAM 1000 MG: 100 INJECTION, SOLUTION INTRAVENOUS at 08:07

## 2023-07-13 RX ADMIN — IPRATROPIUM BROMIDE AND ALBUTEROL SULFATE 3 ML: .5; 3 SOLUTION RESPIRATORY (INHALATION) at 07:07

## 2023-07-13 RX ADMIN — MAGNESIUM SULFATE 2 G: 2 INJECTION INTRAVENOUS at 06:07

## 2023-07-13 RX ADMIN — ACETYLCYSTEINE 2 ML: 200 SOLUTION ORAL; RESPIRATORY (INHALATION) at 11:07

## 2023-07-13 RX ADMIN — IPRATROPIUM BROMIDE AND ALBUTEROL SULFATE 3 ML: .5; 3 SOLUTION RESPIRATORY (INHALATION) at 03:07

## 2023-07-13 RX ADMIN — IPRATROPIUM BROMIDE AND ALBUTEROL SULFATE 3 ML: .5; 3 SOLUTION RESPIRATORY (INHALATION) at 11:07

## 2023-07-13 RX ADMIN — POTASSIUM CHLORIDE 10 MEQ: 7.46 INJECTION, SOLUTION INTRAVENOUS at 06:07

## 2023-07-13 NOTE — ASSESSMENT & PLAN NOTE
- GI EGD clipped duodenal ulcers for IR reference  - Transfuse blood products for active bleeding   - trend CBC and follow  - IR embolized GDA for duodenal hyperemia   -- GI re evaluated site of duodenal ulcers, no noted active bleeding  -- Continue PPI BID for 8 weeks, then once daily after that  --Pt had positive serology for H pylori, begin triple therapy with clarithromycin,amoxicillin, and PPI for 14 days

## 2023-07-13 NOTE — PLAN OF CARE
Recommendations     Diet advancement per SLP.  If unable to advance diet, place NGT & resume TFs. Rec'd Isosource 1.5 @ 50 mL/hr = 1800 kcals, 82 g of protein, 917 mL fluid.  RD to monitor & follow-up.     Goals: Will meet % EEN/EPN by next RD f/u.  Nutrition Goal Status: goal not met  Communication of RD Recs:  (POC)

## 2023-07-13 NOTE — PLAN OF CARE
MICU DAILY GOALS       A: Awake    RASS: Goal - RASS Goal: 0-->alert and calm  Actual - RASS (Pepper Agitation-Sedation Scale): alert and calm   Restraint necessity: Clinical Justification: Removing medical devices  B: Breathe   SBT: Not intubated   C: Coordinate A & B, analgesics/sedatives   Pain: managed    SAT: Not intubated  D: Delirium   CAM-ICU: Overall CAM-ICU: Positive  E: Early(intubated/ Progressive (non-intubated) Mobility   MOVE Screen: Pass   Activity: Activity Management: Arm raise - L1  FAS: Feeding/Nutrition   Diet order: Diet/Nutrition Received: NPO, tube feeding,    T: Thrombus   DVT prophylaxis: VTE Required Core Measure: Per order contraindicated for SCDs/Anticoagulants  H: HOB Elevation   Head of Bed (HOB) Positioning: HOB elevated  U: Ulcer Prophylaxis   GI: yes  G: Glucose control   managed    S: Skin   Bathing/Skin Care: bath, complete  Device Skin Pressure Protection: absorbent pad utilized/changed, adhesive use limited, pressure points protected, positioning supports utilized  Pressure Reduction Devices: specialty bed utilized, foam padding utilized, heel offloading device utilized, positioning supports utilized  Pressure Reduction Techniques: weight shift assistance provided, frequent weight shift encouraged, heels elevated off bed, positioned off wounds, pressure points protected  Skin Protection: adhesive use limited, incontinence pads utilized    B: Bowel Function   diarrhea   I: Indwelling Catheters   Saldaña necessity:      Urethral Catheter 07/05/23 1332 Double-lumen-Reason for Continuing Urinary Catheterization: Critically ill in ICU and requiring hourly monitoring of intake/output   CVC necessity: No  D: De-escalation Antibiotics   Yes    Family/Goals of care/Code Status   Code Status: DNR    24H Vital Sign Range  Temp:  [96.9 °F (36.1 °C)-99 °F (37.2 °C)]   Pulse:  []   Resp:  [8-16]   SpO2:  [92 %-100 %]   Arterial Line BP: (106-162)/(51-92)      Shift Events   No acute  events throughout shift. NGT placed and tube feeds initiated. Pt off BiPAP and placed on comfort flow, tolerating well.     VS and assessment per flow sheet, patient progressing towards goals as tolerated, plan of care reviewed with  pt Marely Hamilton , all concerns addressed, will continue to monitor.    Kem Bearden

## 2023-07-13 NOTE — PT/OT/SLP PROGRESS
Occupational Therapy      Patient Name:  Marely Hamilton   MRN:  [id number]    Patient not seen today secondary to Patient ill (Comment), Nurse/ NIKKI hold (increased O2 requirements; possibly going back to BiPAP; HR in the 130s semisupine in bed.). Will follow-up as able.    7/13/2023

## 2023-07-13 NOTE — PROGRESS NOTES
[facility] - Cardiac Medical ICU  Critical Care Medicine  Progress Note    Patient Name: Marely Hamilton  MRN: [id number]  Admission Date: 7/5/2023  Hospital Length of Stay: 8 days  Code Status: DNR  Attending Provider: Cody Brothers MD  Primary Care Provider: Viktor Ross MD   Principal Problem: Gastrointestinal hemorrhage associated with duodenal ulcer    Subjective:     HPI:  Marely Hamilton is a 58 y.o. female with history of alcoholic cirrhosis, seizure disorder, DVT and IJ thrombus with recent admission for large retroperitoneal hemorrhage requiring IR embolization and IVC filter placement who presents for hematochezia. Onset this morning at [facility], alida blood per rectum per chart, sent to ED. Initially normotensive but then severe hypotension prompted initiation of levophed and intubation. Hgb 6.8, 2u pRBC given + 1u FFP ordered. Hgb 8.5 on 7/2. On levo and vaso currently. K 6.6 but renal function at baseline. Repeat pending. No prior EGDs on record.      Hospital/ICU Course:  Patient was seen at bedside, hematochezia still present post op by IR. Patient has required many transfusions of pRBCs and platelets due to ongoing down trending of Hgb. No clear source of ongoing hemorrhage by imaging. Patient has continued to require pressors to maintain MAP >65. Discussed with GI and IR regarding active bleeding post op, IR indicated there is not much else to do from their end bc they embolized a significant part of GDA. Pt is off of pressor requirement and not actively bleeding. GI re evaluated pt via EGD and found no sources of active bleeding. Pt is extubated and currently on BIPAP requirement due to de saturation in the 80's. Pt is to be seen by speech and PT/OT. Clear mucinous secretions via suction, chest physiotherapy, and cough assist device. Nebs to help with breathing as well. Pt is off BIPAP and currently on comfort flow. Triple therapy (amoxicillin, clarithromycin) started for 14 days to treat for positive H  pylori serology. Pt also had a NG tube placed so we may begin tube feedings. CxR, EKG, and ABG displayed no reason for tachycardia. Pt becomes anxious when seen by different provider teams.      Interval History/Significant Events: Pt is on comfort flow. NG tube placed for medication and tube feedings. Start 14 day triple therapy for positive H pylori serology. Wean down comfort flow as tolerated.    Review of Systems   Unable to perform ROS: Patient unresponsive   Objective:     Vital Signs (Most Recent):  Temp: 98.5 °F (36.9 °C) (07/13/23 1100)  Pulse: 109 (07/13/23 1300)  Resp: (!) 8 (07/13/23 1300)  BP: (!) 116/58 (07/11/23 0200)  SpO2: 96 % (07/13/23 1300) Vital Signs (24h Range):  Temp:  [96.9 °F (36.1 °C)-98.7 °F (37.1 °C)] 98.5 °F (36.9 °C)  Pulse:  [] 109  Resp:  [8-17] 8  SpO2:  [96 %-100 %] 96 %  Arterial Line BP: (106-162)/(50-92) 137/64   Weight: 59.4 kg (130 lb 15.3 oz)  Body mass index is 23.95 kg/m².      Intake/Output Summary (Last 24 hours) at 7/13/2023 1342  Last data filed at 7/13/2023 1300  Gross per 24 hour   Intake 70 ml   Output 2820 ml   Net -2750 ml            Physical Exam  Constitutional:       Appearance: She is ill-appearing and toxic-appearing.      Interventions: She is intubated.   HENT:      Head: Normocephalic.   Eyes:      General: Scleral icterus present.   Neck:      Thyroid: No thyroid mass or thyroid tenderness.      Vascular: No carotid bruit or hepatojugular reflux.   Cardiovascular:      Rate and Rhythm: Normal rate.      Pulses: Decreased pulses.      Heart sounds: Heart sounds are distant.   Pulmonary:      Effort: She is intubated.   Chest:      Chest wall: No mass, lacerations or deformity.   Breasts:     Right: No swelling.      Left: No swelling.   Abdominal:      General: Abdomen is flat.      Palpations: Abdomen is rigid.      Hernia: No hernia is present.   Genitourinary:     Vagina: Normal.   Musculoskeletal:      Cervical back: Rigidity present.    Lymphadenopathy:      Cervical: No cervical adenopathy.   Skin:     Coloration: Skin is jaundiced.      Findings: Ecchymosis present.   Neurological:      Mental Status: She is unresponsive.          Vents:  Vent Mode: (S) Spont (07/11/23 1154)  Ventilator Initiated: Yes (07/05/23 1221)  Set Rate: 18 BPM (07/11/23 1135)  Vt Set: 300 mL (07/11/23 1135)  Pressure Support: 5 cmH20 (07/11/23 1154)  PEEP/CPAP: 5 cmH20 (07/11/23 1154)  Oxygen Concentration (%): 40 (07/13/23 1300)  Peak Airway Pressure: 10 cmH20 (07/11/23 1154)  Plateau Pressure: 0 cmH20 (07/11/23 1154)  Total Ve: 6.51 L/m (07/11/23 1154)  Negative Inspiratory Force (cm H2O): 0 (07/11/23 1154)  F/VT Ratio<105 (RSBI): (!) 41.56 (07/11/23 1154)  Lines/Drains/Airways       Drain  Duration                  Urethral Catheter 07/05/23 1332 Double-lumen 8 days         Fecal Incontinence  07/09/23 2300 3 days         NG/OG Tube 07/13/23 1311 [place] sump 18 Fr. Right nostril <1 day              Arterial Line  Duration             Arterial Line 07/07/23 0232 Left Radial 6 days              Peripheral Intravenous Line  Duration                  Peripheral IV - Single Lumen 07/11/23 1800 20 G Left Upper Arm 1 day         Peripheral IV - Single Lumen 07/12/23 1148 20 G;1 3/4 in Left Forearm 1 day                  Significant Labs:    CBC/Anemia Profile:  Recent Labs   Lab 07/12/23  0828 07/12/23  2151 07/13/23  0857   WBC 4.87 6.70 5.18   HGB 8.7* 8.8* 8.7*   HCT 27.0* 27.8* 27.7*   PLT 68* 59* 65*   MCV 96 97 98   RDW 22.5* 22.7* 23.5*          Chemistries:  Recent Labs   Lab 07/12/23  0315 07/12/23  1603 07/13/23  0343    147* 144   K 4.0 3.9 3.5   * 120* 116*   CO2 19* 17* 20*   BUN 8 8 9   CREATININE 0.4* 0.4* 0.4*   CALCIUM 7.9* 7.9* 8.1*   ALBUMIN 2.9* 2.7* 2.6*   PROT 4.9* 4.7* 4.8*   BILITOT 3.4* 3.4* 3.5*   ALKPHOS 69 67 71   ALT 14 11 11   AST 30 27 25   MG 2.2  --  1.8   PHOS 2.9 2.3* 2.8         All pertinent labs within the past 24  hours have been reviewed.    Significant Imaging:  I have reviewed all pertinent imaging results/findings within the past 24 hours.      ABG  Recent Labs   Lab 07/13/23  1044   PH 7.429   PO2 150*   PCO2 34.5*   HCO3 22.9*   BE -1     Assessment/Plan:     Oncology  Acute blood loss anemia  - Maintain IV access with 2 large bore Ivs  - Intravascular resuscitation/support with IVFs   - Hold all NSAIDs and anticoagulants, unless contraindicated.  - Please correct any coagulopathy with platelets and FFP for goal of platelets >50K and INR <2.0  - Please notify GI team if there is significant change in patient's clinical status  - To date, patient has required at least 21 units of pRBCs to maintain Hgb >7         Endocrine  Severe protein-calorie malnutrition  Nutrition consulted. Most recent weight and BMI monitored-     Measurements:  Wt Readings from Last 1 Encounters:   07/13/23 59.4 kg (130 lb 15.3 oz)   Body mass index is 23.95 kg/m².    Patient has been screened and assessed by RD.    Malnutrition Type:  Context: social/environmental circumstances  Level: severe    Malnutrition Characteristic Summary:  Energy Intake (Malnutrition): less than or equal to 75% for greater than or equal to 1 month  Subcutaneous Fat (Malnutrition): severe depletion  Muscle Mass (Malnutrition): severe depletion    Interventions/Recommendations (treatment strategy):  1.     Plan  -Initiating tube feeds with above formulation      GI  * Gastrointestinal hemorrhage associated with duodenal ulcer  See above     Hematochezia  - GI EGD clipped duodenal ulcers for IR reference  - Transfuse blood products for active bleeding   - trend CBC and follow  - IR embolized GDA for duodenal hyperemia   -- GI re evaluated site of duodenal ulcers, no noted active bleeding  -- Continue PPI BID for 8 weeks, then once daily after that  --Pt had positive serology for H pylori, begin triple therapy with clarithromycin,amoxicillin, and PPI for 14  days    Other  Retroperitoneal bleed  - CT-A demonstrated stable retroperitoneal hematoma. No need for intervention at this time.       Critical Care Daily Checklist:    A: Awake: RASS Goal/Actual Goal: RASS Goal: 0-->alert and calm  Actual:     B: Spontaneous Breathing Trial Performed? Spon. Breathing Trial Initiated?: Initiated (07/11/23 1529)   C: SAT & SBT Coordinated?  n/a                      D: Delirium: CAM-ICU Overall CAM-ICU: Positive   E: Early Mobility Performed? Yes   F: Feeding Goal: Goals: Will meet % EEN/EPN by next RD f/u.  Status: Nutrition Goal Status: goal not met   Current Diet Order   Procedures    Diet NPO      AS: Analgesia/Sedation n/a   T: Thromboembolic Prophylaxis n/a   H: HOB > 300 Yes   U: Stress Ulcer Prophylaxis (if needed) PPI   G: Glucose Control SSI   B: Bowel Function Stool Occurrence: 1   I: Indwelling Catheter (Lines & Saldaña) Necessity Saldaña   D: De-escalation of Antimicrobials/Pharmacotherapies n/a    Plan for the day/ETD Wean comfort flow, NG tube feedings    Code Status:  Family/Goals of Care: DNR  n/a       Critical secondary to Patient has a condition that poses threat to life and bodily function: Hemorrhagic shock      Critical care was time spent personally by me on the following activities: development of treatment plan with patient or surrogate and bedside caregivers, discussions with consultants, evaluation of patient's response to treatment, examination of patient, ordering and performing treatments and interventions, ordering and review of laboratory studies, ordering and review of radiographic studies, pulse oximetry, re-evaluation of patient's condition. This critical care time did not overlap with that of any other provider or involve time for any procedures.     Phong Jean,   Critical Care Medicine  [facility] - Cardiac Medical ICU

## 2023-07-13 NOTE — SUBJECTIVE & OBJECTIVE
Interval History/Significant Events: Pt is on comfort flow. NG tube placed for medication and tube feedings. Start 14 day triple therapy for positive H pylori serology. Wean down comfort flow as tolerated.    Review of Systems   Unable to perform ROS: Patient unresponsive   Objective:     Vital Signs (Most Recent):  Temp: 98.5 °F (36.9 °C) (07/13/23 1100)  Pulse: 109 (07/13/23 1300)  Resp: (!) 8 (07/13/23 1300)  BP: (!) 116/58 (07/11/23 0200)  SpO2: 96 % (07/13/23 1300) Vital Signs (24h Range):  Temp:  [96.9 °F (36.1 °C)-98.7 °F (37.1 °C)] 98.5 °F (36.9 °C)  Pulse:  [] 109  Resp:  [8-17] 8  SpO2:  [96 %-100 %] 96 %  Arterial Line BP: (106-162)/(50-92) 137/64   Weight: 59.4 kg (130 lb 15.3 oz)  Body mass index is 23.95 kg/m².      Intake/Output Summary (Last 24 hours) at 7/13/2023 1342  Last data filed at 7/13/2023 1300  Gross per 24 hour   Intake 70 ml   Output 2820 ml   Net -2750 ml            Physical Exam  Constitutional:       Appearance: She is ill-appearing and toxic-appearing.      Interventions: She is intubated.   HENT:      Head: Normocephalic.   Eyes:      General: Scleral icterus present.   Neck:      Thyroid: No thyroid mass or thyroid tenderness.      Vascular: No carotid bruit or hepatojugular reflux.   Cardiovascular:      Rate and Rhythm: Normal rate.      Pulses: Decreased pulses.      Heart sounds: Heart sounds are distant.   Pulmonary:      Effort: She is intubated.   Chest:      Chest wall: No mass, lacerations or deformity.   Breasts:     Right: No swelling.      Left: No swelling.   Abdominal:      General: Abdomen is flat.      Palpations: Abdomen is rigid.      Hernia: No hernia is present.   Genitourinary:     Vagina: Normal.   Musculoskeletal:      Cervical back: Rigidity present.   Lymphadenopathy:      Cervical: No cervical adenopathy.   Skin:     Coloration: Skin is jaundiced.      Findings: Ecchymosis present.   Neurological:      Mental Status: She is unresponsive.           Vents:  Vent Mode: (S) Spont (07/11/23 1154)  Ventilator Initiated: Yes (07/05/23 1221)  Set Rate: 18 BPM (07/11/23 1135)  Vt Set: 300 mL (07/11/23 1135)  Pressure Support: 5 cmH20 (07/11/23 1154)  PEEP/CPAP: 5 cmH20 (07/11/23 1154)  Oxygen Concentration (%): 40 (07/13/23 1300)  Peak Airway Pressure: 10 cmH20 (07/11/23 1154)  Plateau Pressure: 0 cmH20 (07/11/23 1154)  Total Ve: 6.51 L/m (07/11/23 1154)  Negative Inspiratory Force (cm H2O): 0 (07/11/23 1154)  F/VT Ratio<105 (RSBI): (!) 41.56 (07/11/23 1154)  Lines/Drains/Airways       Drain  Duration                  Urethral Catheter 07/05/23 1332 Double-lumen 8 days         Fecal Incontinence  07/09/23 2300 3 days         NG/OG Tube 07/13/23 1311 [place] sump 18 Fr. Right nostril <1 day              Arterial Line  Duration             Arterial Line 07/07/23 0232 Left Radial 6 days              Peripheral Intravenous Line  Duration                  Peripheral IV - Single Lumen 07/11/23 1800 20 G Left Upper Arm 1 day         Peripheral IV - Single Lumen 07/12/23 1148 20 G;1 3/4 in Left Forearm 1 day                  Significant Labs:    CBC/Anemia Profile:  Recent Labs   Lab 07/12/23  0828 07/12/23  2151 07/13/23  0857   WBC 4.87 6.70 5.18   HGB 8.7* 8.8* 8.7*   HCT 27.0* 27.8* 27.7*   PLT 68* 59* 65*   MCV 96 97 98   RDW 22.5* 22.7* 23.5*          Chemistries:  Recent Labs   Lab 07/12/23  0315 07/12/23  1603 07/13/23  0343    147* 144   K 4.0 3.9 3.5   * 120* 116*   CO2 19* 17* 20*   BUN 8 8 9   CREATININE 0.4* 0.4* 0.4*   CALCIUM 7.9* 7.9* 8.1*   ALBUMIN 2.9* 2.7* 2.6*   PROT 4.9* 4.7* 4.8*   BILITOT 3.4* 3.4* 3.5*   ALKPHOS 69 67 71   ALT 14 11 11   AST 30 27 25   MG 2.2  --  1.8   PHOS 2.9 2.3* 2.8         All pertinent labs within the past 24 hours have been reviewed.    Significant Imaging:  I have reviewed all pertinent imaging results/findings within the past 24 hours.

## 2023-07-13 NOTE — PROGRESS NOTES
[facility] - Cardiac Medical ICU  Adult Nutrition  Progress Note    SUMMARY       Recommendations    Diet advancement per SLP.  If unable to advance diet, place NGT & resume TFs. Rec'd Isosource 1.5 @ 50 mL/hr = 1800 kcals, 82 g of protein, 917 mL fluid.  RD to monitor & follow-up.    Goals: Will meet % EEN/EPN by next RD f/u.  Nutrition Goal Status: goal not met  Communication of RD Recs:  (POC)    Assessment and Plan    Severe protein-calorie malnutrition    Malnutrition Type:  Context: social/environmental circumstances  Level: severe    Related to (etiology):   Decline in ADL's, physiological cause    Signs and Symptoms (as evidenced by):   Findings of NFPE and poor PO intake    Malnutrition Characteristic Summary:  Energy Intake (Malnutrition): less than or equal to 75% for greater than or equal to 1 month  Subcutaneous Fat (Malnutrition): severe depletion  Muscle Mass (Malnutrition): severe depletion    Interventions/Recommendations (treatment strategy):  Collaboration of nutrition care with other providers  EN    Nutrition Diagnosis Status:   Continues    Malnutrition Assessment    Malnutrition Context: social/environmental circumstances  Malnutrition Level: severe    Micronutrient Evaluation Summary: suspected deficiency  Micronutrient Evaluation Comments: protein, iron, B vitamins   Energy Intake (Malnutrition): less than or equal to 75% for greater than or equal to 1 month  Subcutaneous Fat (Malnutrition): severe depletion  Muscle Mass (Malnutrition): severe depletion   Orbital Region (Subcutaneous Fat Loss): severe depletion  Upper Arm Region (Subcutaneous Fat Loss): severe depletion  Thoracic and Lumbar Region: severe depletion   [place] Region (Muscle Loss): severe depletion  Clavicle Bone Region (Muscle Loss): severe depletion  Clavicle and Acromion Bone Region (Muscle Loss): severe depletion  Scapular Bone Region (Muscle Loss): severe depletion  Patellar Region (Muscle Loss): severe  "depletion  Anterior Thigh Region (Muscle Loss): severe depletion  Posterior Calf Region (Muscle Loss): severe depletion     Reason for Assessment    Reason For Assessment: RD follow-up  Diagnosis: GI hemorrhage   Relevant Medical History: severe protein-kcal malnutrition, ETOH abuse in remission, seizure disorder  Interdisciplinary Rounds: attended    General Information Comments: Extubated 7/11 - remains NPO. EGD complete 7/11. + Weight loss PTA & severe wasting on physical exam (7/6). Pt meets criteria for severe malnutrition. Please see PES statement for details. Pt also noted w/ +2-3 edema.  Nutrition Discharge Planning: Pending clinical course    Nutrition/Diet History    Patient Reported Diet/Restrictions/Preferences: general  Spiritual, Cultural Beliefs, Congregation Practices, Values that Affect Care: no  Supplemental Drinks or Food Habits: Ensure Plus  Food Allergies: NKFA  Factors Affecting Nutritional Intake: NPO    Anthropometrics    Temp: 96.9 °F (36.1 °C)  Height: 5' 2" (157.5 cm)  Height (inches): 62 in  Weight Method: Bed Scale  Weight: 59.4 kg (130 lb 15.3 oz)  Weight (lb): 130.95 lb  Ideal Body Weight (IBW), Female: 110 lb  % Ideal Body Weight, Female (lb): 119.05 %  BMI (Calculated): 23.9  BMI Grade: 18.5-24.9 - normal    Lab/Procedures/Meds    Pertinent Labs Reviewed: reviewed  Pertinent Labs Comments: -  Pertinent Medications Reviewed: reviewed  Pertinent Medications Comments: -    Estimated/Assessed Needs    Weight Used For Calorie Calculations: 59.4 kg (130 lb 15.3 oz)    Energy Calorie Requirements (kcal): 0129-7022 kcal/d  Energy Need Method: Kcal/kg (30-35 kcal/kg)    Protein Requirements: 89 g/d (1.5 g/kg)  Weight Used For Protein Calculations: 59.4 kg (130 lb 15.3 oz)    Estimated Fluid Requirement Method: other (see comments) (Per MD or 1 mL/kcal)  RDA Method (mL): 1782    Nutrition Prescription Ordered    Current Diet Order: NPO  Current Nutrition Support Formula Ordered: Impact Peptide " 1.5    Evaluation of Received Nutrient/Fluid Intake    I/O: +6.4L since admit    Comments: LBM: 7/12    Nutrition Risk    Level of Risk/Frequency of Follow-up:  (1x/week)     Monitor and Evaluation    Food and Nutrient Intake: enteral nutrition intake  Food and Nutrient Adminstration: enteral and parenteral nutrition administration  Knowledge/Beliefs/Attitudes: beliefs and attitudes, food and nutrition knowledge/skill  Physical Activity and Function: nutrition-related ADLs and IADLs  Anthropometric Measurements: body mass index, weight change, weight, height/length  Biochemical Data, Medical Tests and Procedures: lipid profile, inflammatory profile, glucose/endocrine profile, gastrointestinal profile, electrolyte and renal panel  Nutrition-Focused Physical Findings: overall appearance     Nutrition Follow-Up    RD Follow-up?: Yes

## 2023-07-13 NOTE — PT/OT/SLP EVAL
Speech Language Pathology Evaluation  Bedside Swallow    Patient Name:  Marely Hamilton   MRN:  [id number]  Admitting Diagnosis: Gastrointestinal hemorrhage associated with duodenal ulcer    Recommendations:                 General Recommendations:  Dysphagia therapy  Diet recommendations:  NPO, NPO   Aspiration Precautions:   Meds crsuhed in applesauce vs pudding- please utilize applesauce for meds over pudding if able   Ice chips sparingly throughout the day for pleasure   General Precautions: Standard, aspiration, fall, NPO  Communication strategies:  provide increased time to answer    Assessment:     Marely Hamilton is a 58 y.o. female with an SLP diagnosis of Dysphagia and Dysphonia. She presents with overt signs of aspiration/airway compromise with larger trials this date though is safe for initiation of ice chip regimen for swallow stimulation. SLP to continue to follow.    History:     Past Medical History:   Diagnosis Date    Addiction to drug     Alcohol abuse     Alcohol abuse, in remission 6/15/2015    14.5 weeks ago; AA weekly    Anemia     Anxiety 6/15/2015    Behavioral problem     Bipolar disorder     Bipolar disorder in remission 6/15/2015    Cirrhosis, Laennec's 6/15/2015    Depression     Encounter for blood transfusion     Epistaxis 6/15/2015    Fatigue     Glaucoma     Hematuria     Hepatic encephalopathy 6/15/2015    Hepatic enlargement     History of psychiatric care     History of psychiatric hospitalization     History of seizure 6/15/2015    1    hx of intentional Tylenol overdose 6/15/2015    2005- situational and hx of bipolar    Hx of psychiatric care     Macrocytic anemia 9/18/2015    6 units PRBC since June 2015    [facility]     Osteoarthritis     Other ascites 6/15/2015    Psychiatric exam requested by authority     Psychiatric problem     Psychosis 9/26/2019    Renal disorder     Seizures     Self-harming behavior     Suicide attempt     Therapy     Thrombocytopenia 6/15/2015       Past  "Surgical History:   Procedure Laterality Date    COSMETIC SURGERY      EMBOLIZATION N/A 6/11/2023    Procedure: EMBOLIZATION, BLOOD VESSEL;  Surgeon: Debbie Surgeon;  Location: [facility];  Service: Anesthesiology;  Laterality: N/A;    ESOPHAGOGASTRODUODENOSCOPY      ESOPHAGOGASTRODUODENOSCOPY N/A 7/6/2023    Procedure: EGD (ESOPHAGOGASTRODUODENOSCOPY);  Surgeon: Bernice Guillen MD;  Location: [facility] (2ND FLR);  Service: Endoscopy;  Laterality: N/A;    ESOPHAGOGASTRODUODENOSCOPY N/A 7/11/2023    Procedure: EGD (ESOPHAGOGASTRODUODENOSCOPY);  Surgeon: Rangel Broussard MD;  [facility]  Service: Endoscopy;  Laterality: N/A;       Social History: Patient previously lived in her home with her mother who has since passed since 's hospital admit    Prior Intubation HX:  7/5 - 7/11    Modified Barium Swallow: n/a    Chest X-Rays: 7/13/23- The cardiomediastinal silhouette is stable in configuration.  There there may be trace left pleural effusion..  The trachea is midline.  The lungs are symmetrically expanded bilaterally with coarse interstitial attenuation in a perihilar distribution, similar in appearance to the previous examination.  There is left basilar subsegmental atelectasis versus consolidation, appears possibly improved since the previous exam..  No new large focal consolidation.  There is no pneumothorax.  The osseous structures are unchanged..    Prior diet: Regular/thin    Occupation/hobbies/homemaking: none stated    Subjective     Spoke with RN prior to session. Pt found resting in bed upon SLP entry into room.      Patient goals: "I used to do that when I was across the bridge"     Pain/Comfort:  Pain Rating 1:  (none stated)    Respiratory Status: Comfort flow, flow 35 L/min, concentration 40%    Objective:     Oral Musculature Evaluation  Oral Musculature: general weakness  Dentition: present and adequate  Secretion Management: adequate  Mucosal Quality: dry, sticky  Oral Labial " Strength and Mobility: functional seal  Lingual Strength and Mobility: functional protrusion, functional anterior elevation  Voice Prior to PO Intake: moderately dysphonic- hoarse, rbeathy, low volume though improving slightly as sesison progressed    Bedside Swallow Eval:   Consistencies Assessed:  Ice chips x3  Thin liquids via tsp x3   Puree via 1/2 tsp x4     Oral Phase:   Decreased closure around utensil across all trials  Slow oral transit time with puree trials though timelier with ongoing trials     Pharyngeal Phase:   Multiple swallows across all trials  Increasing wet vocal quality with ongoing tsp trials of thin liquids  No overt signs of airway compromise with puree trials     Compensatory Strategies  None    Treatment: Pt presenting with decreased sustain attention as she was noted to become distracted by external stimuli (i.e. TV being on, hallway traffic). SLP decreased environmental stimuli though pt continued to require intermittent verbal and tactile redirection to PO trials. Oral mucosa sticky with dried as well as stringy wet secretions lining lips and teeth. Mildly wet vocal quality noted prior to PO trials with poor ability to clear despite MAX cueing for throat clears and coughing. SLP provided education regarding overall impressions, overt s/s of aspiration, increased risk for aspiration-related complications, recommendations for NPO status, ice chips sparingly, and SLP POC. Pt verbalized understanding but would continue to benefit from ongoing education. Spoke with RN regarding impressions and recommendations and nursing verbalized understanding.      Goals:   Multidisciplinary Problems       SLP Goals          Problem: SLP    Goal Priority Disciplines Outcome   SLP Goal     SLP Ongoing, Progressing   Description: Speech Pathology Goals  To be met by 7/27/23    1. Pt will participate in ongoing diagnostic dysphagia therapy                              Plan:     Patient to be seen:  4 x/week    Plan of Care expires:  08/12/23  Plan of Care reviewed with:  patient   SLP Follow-Up:  Yes       Discharge recommendations:   (TBD)   Barriers to Discharge:  Level of Skilled Assistance Needed      Time Tracking:     SLP Treatment Date:   07/13/23  Speech Start Time:  1052  Speech Stop Time:  1122     Speech Total Time (min):  30 min    Billable Minutes: Eval Swallow and Oral Function 20 and Self Care/Home Management Training 10      07/13/2023

## 2023-07-13 NOTE — PT/OT/SLP PROGRESS
Physical Therapy      Patient Name:  Marely Hamilton   MRN:  [id number]    Patient not seen today secondary to  (pt not seen. During evaluation questioning, pt HR increased to 130 in supine. RN put pt on hold.). Will follow-up at a later date    7/13/2023      SOCIAL per pt sister via phone: pt was living with her mother who has passed since pt in hospital. They were living in 1 story with 4 steps and R handrail. Pt was not working and Independent with activities prior to admit. Pt owns w/c BSC, walk in shower with removable tub bench, grab bars. Sister states pt. will have assistance after discharge. .

## 2023-07-13 NOTE — PLAN OF CARE
Problem: SLP  Goal: SLP Goal  Description: Speech Pathology Goals  To be met by 7/27/23    1. Pt will participate in ongoing diagnostic dysphagia therapy         Outcome: Ongoing, Progressing     Clinical swallow evaluation completed. Recommend NPO status with meds crushed in pudding/applesauce and ice chips sparingly throughout the day. SLP to continue to follow.

## 2023-07-13 NOTE — ASSESSMENT & PLAN NOTE
Nutrition consulted. Most recent weight and BMI monitored-     Measurements:  Wt Readings from Last 1 Encounters:   07/13/23 59.4 kg (130 lb 15.3 oz)   Body mass index is 23.95 kg/m².    Patient has been screened and assessed by RD.    Malnutrition Type:  Context: social/environmental circumstances  Level: severe    Malnutrition Characteristic Summary:  Energy Intake (Malnutrition): less than or equal to 75% for greater than or equal to 1 month  Subcutaneous Fat (Malnutrition): severe depletion  Muscle Mass (Malnutrition): severe depletion    Interventions/Recommendations (treatment strategy):  1.     Plan  -Initiating tube feeds with above formulation

## 2023-07-14 LAB
ALBUMIN SERPL BCP-MCNC: 2.7 G/DL (ref 3.5–5.2)
ALP SERPL-CCNC: 82 U/L (ref 55–135)
ALT SERPL W/O P-5'-P-CCNC: 10 U/L (ref 10–44)
ANION GAP SERPL CALC-SCNC: 9 MMOL/L (ref 8–16)
APTT PPP: 30.1 SEC (ref 21–32)
AST SERPL-CCNC: 26 U/L (ref 10–40)
BASOPHILS # BLD AUTO: 0.01 K/UL (ref 0–0.2)
BASOPHILS NFR BLD: 0.1 % (ref 0–1.9)
BILIRUB SERPL-MCNC: 4 MG/DL (ref 0.1–1)
BUN SERPL-MCNC: 8 MG/DL (ref 6–20)
CA-I BLDV-SCNC: 1.18 MMOL/L (ref 1.06–1.42)
CA-I BLDV-SCNC: 1.2 MMOL/L (ref 1.06–1.42)
CALCIUM SERPL-MCNC: 8.1 MG/DL (ref 8.7–10.5)
CHLORIDE SERPL-SCNC: 118 MMOL/L (ref 95–110)
CO2 SERPL-SCNC: 18 MMOL/L (ref 23–29)
CREAT SERPL-MCNC: 0.4 MG/DL (ref 0.5–1.4)
DIFFERENTIAL METHOD: ABNORMAL
EOSINOPHIL # BLD AUTO: 0.1 K/UL (ref 0–0.5)
EOSINOPHIL NFR BLD: 0.7 % (ref 0–8)
ERYTHROCYTE [DISTWIDTH] IN BLOOD BY AUTOMATED COUNT: 24.3 % (ref 11.5–14.5)
EST. GFR  (NO RACE VARIABLE): >60 ML/MIN/1.73 M^2
GLUCOSE SERPL-MCNC: 100 MG/DL (ref 70–110)
HCT VFR BLD AUTO: 27.2 % (ref 37–48.5)
HGB BLD-MCNC: 8.6 G/DL (ref 12–16)
IMM GRANULOCYTES # BLD AUTO: 0.02 K/UL (ref 0–0.04)
IMM GRANULOCYTES NFR BLD AUTO: 0.3 % (ref 0–0.5)
INR PPP: 1.7 (ref 0.8–1.2)
LYMPHOCYTES # BLD AUTO: 0.6 K/UL (ref 1–4.8)
LYMPHOCYTES NFR BLD: 8.7 % (ref 18–48)
MAGNESIUM SERPL-MCNC: 1.7 MG/DL (ref 1.6–2.6)
MCH RBC QN AUTO: 31 PG (ref 27–31)
MCHC RBC AUTO-ENTMCNC: 31.6 G/DL (ref 32–36)
MCV RBC AUTO: 98 FL (ref 82–98)
MONOCYTES # BLD AUTO: 0.4 K/UL (ref 0.3–1)
MONOCYTES NFR BLD: 5.7 % (ref 4–15)
NEUTROPHILS # BLD AUTO: 6.2 K/UL (ref 1.8–7.7)
NEUTROPHILS NFR BLD: 84.5 % (ref 38–73)
NRBC BLD-RTO: 0 /100 WBC
PHOSPHATE SERPL-MCNC: 2.4 MG/DL (ref 2.7–4.5)
PLATELET # BLD AUTO: 63 K/UL (ref 150–450)
PMV BLD AUTO: 11.4 FL (ref 9.2–12.9)
POCT GLUCOSE: 111 MG/DL (ref 70–110)
POCT GLUCOSE: 68 MG/DL (ref 70–110)
POCT GLUCOSE: 88 MG/DL (ref 70–110)
POCT GLUCOSE: 97 MG/DL (ref 70–110)
POTASSIUM SERPL-SCNC: 3.6 MMOL/L (ref 3.5–5.1)
PROT SERPL-MCNC: 4.9 G/DL (ref 6–8.4)
PROTHROMBIN TIME: 17.5 SEC (ref 9–12.5)
RBC # BLD AUTO: 2.77 M/UL (ref 4–5.4)
SODIUM SERPL-SCNC: 145 MMOL/L (ref 136–145)
WBC # BLD AUTO: 7.35 K/UL (ref 3.9–12.7)

## 2023-07-14 PROCEDURE — 94660 CPAP INITIATION&MGMT: CPT

## 2023-07-14 PROCEDURE — 94640 AIRWAY INHALATION TREATMENT: CPT

## 2023-07-14 PROCEDURE — 93005 ELECTROCARDIOGRAM TRACING: CPT

## 2023-07-14 PROCEDURE — 82330 ASSAY OF CALCIUM: CPT | Mod: 91

## 2023-07-14 PROCEDURE — 99900035 HC TECH TIME PER 15 MIN (STAT)

## 2023-07-14 PROCEDURE — C9113 INJ PANTOPRAZOLE SODIUM, VIA: HCPCS | Performed by: STUDENT IN AN ORGANIZED HEALTH CARE EDUCATION/TRAINING PROGRAM

## 2023-07-14 PROCEDURE — 25000242 PHARM REV CODE 250 ALT 637 W/ HCPCS

## 2023-07-14 PROCEDURE — 27100171 HC OXYGEN HIGH FLOW UP TO 24 HOURS

## 2023-07-14 PROCEDURE — 85025 COMPLETE CBC W/AUTO DIFF WBC: CPT

## 2023-07-14 PROCEDURE — 25000003 PHARM REV CODE 250

## 2023-07-14 PROCEDURE — 93010 EKG 12-LEAD: ICD-10-PCS | Mod: ,,, | Performed by: INTERNAL MEDICINE

## 2023-07-14 PROCEDURE — 99291 PR CRITICAL CARE, E/M 30-74 MINUTES: ICD-10-PCS | Mod: GC,,, | Performed by: INTERNAL MEDICINE

## 2023-07-14 PROCEDURE — 80053 COMPREHEN METABOLIC PANEL: CPT

## 2023-07-14 PROCEDURE — 93010 ELECTROCARDIOGRAM REPORT: CPT | Mod: ,,, | Performed by: INTERNAL MEDICINE

## 2023-07-14 PROCEDURE — 92526 ORAL FUNCTION THERAPY: CPT

## 2023-07-14 PROCEDURE — 25000242 PHARM REV CODE 250 ALT 637 W/ HCPCS: Performed by: INTERNAL MEDICINE

## 2023-07-14 PROCEDURE — 27201109 HC SYSTEM FECAL MANAGEMENT

## 2023-07-14 PROCEDURE — 99291 CRITICAL CARE FIRST HOUR: CPT | Mod: GC,,, | Performed by: INTERNAL MEDICINE

## 2023-07-14 PROCEDURE — 63600175 PHARM REV CODE 636 W HCPCS

## 2023-07-14 PROCEDURE — 97162 PT EVAL MOD COMPLEX 30 MIN: CPT

## 2023-07-14 PROCEDURE — 20000000 HC ICU ROOM

## 2023-07-14 PROCEDURE — 25000003 PHARM REV CODE 250: Performed by: STUDENT IN AN ORGANIZED HEALTH CARE EDUCATION/TRAINING PROGRAM

## 2023-07-14 PROCEDURE — 63600175 PHARM REV CODE 636 W HCPCS: Performed by: STUDENT IN AN ORGANIZED HEALTH CARE EDUCATION/TRAINING PROGRAM

## 2023-07-14 PROCEDURE — 94668 MNPJ CHEST WALL SBSQ: CPT

## 2023-07-14 PROCEDURE — 85610 PROTHROMBIN TIME: CPT

## 2023-07-14 PROCEDURE — 97165 OT EVAL LOW COMPLEX 30 MIN: CPT

## 2023-07-14 PROCEDURE — 84100 ASSAY OF PHOSPHORUS: CPT

## 2023-07-14 PROCEDURE — 94761 N-INVAS EAR/PLS OXIMETRY MLT: CPT

## 2023-07-14 PROCEDURE — 83735 ASSAY OF MAGNESIUM: CPT

## 2023-07-14 PROCEDURE — 97530 THERAPEUTIC ACTIVITIES: CPT

## 2023-07-14 PROCEDURE — 97535 SELF CARE MNGMENT TRAINING: CPT

## 2023-07-14 PROCEDURE — 85730 THROMBOPLASTIN TIME PARTIAL: CPT

## 2023-07-14 RX ORDER — MAGNESIUM SULFATE HEPTAHYDRATE 40 MG/ML
2 INJECTION, SOLUTION INTRAVENOUS ONCE
Status: COMPLETED | OUTPATIENT
Start: 2023-07-14 | End: 2023-07-14

## 2023-07-14 RX ORDER — HYDROXYZINE HYDROCHLORIDE 25 MG/1
50 TABLET, FILM COATED ORAL 3 TIMES DAILY PRN
Status: DISCONTINUED | OUTPATIENT
Start: 2023-07-14 | End: 2023-07-18

## 2023-07-14 RX ORDER — HYDROXYZINE HYDROCHLORIDE 25 MG/1
50 TABLET, FILM COATED ORAL 3 TIMES DAILY PRN
Status: DISCONTINUED | OUTPATIENT
Start: 2023-07-14 | End: 2023-07-14

## 2023-07-14 RX ADMIN — CLARITHROMYCIN 500 MG: 500 TABLET, FILM COATED ORAL at 08:07

## 2023-07-14 RX ADMIN — AMOXICILLIN 1000 MG: 500 CAPSULE ORAL at 09:07

## 2023-07-14 RX ADMIN — IPRATROPIUM BROMIDE AND ALBUTEROL SULFATE 3 ML: .5; 3 SOLUTION RESPIRATORY (INHALATION) at 11:07

## 2023-07-14 RX ADMIN — ACETYLCYSTEINE 2 ML: 200 SOLUTION ORAL; RESPIRATORY (INHALATION) at 11:07

## 2023-07-14 RX ADMIN — LEVETIRACETAM 1000 MG: 100 INJECTION, SOLUTION INTRAVENOUS at 09:07

## 2023-07-14 RX ADMIN — DEXTROSE MONOHYDRATE 125 ML: 100 INJECTION, SOLUTION INTRAVENOUS at 12:07

## 2023-07-14 RX ADMIN — ACETYLCYSTEINE 2 ML: 200 SOLUTION ORAL; RESPIRATORY (INHALATION) at 05:07

## 2023-07-14 RX ADMIN — POTASSIUM & SODIUM PHOSPHATES POWDER PACK 280-160-250 MG 2 PACKET: 280-160-250 PACK at 05:07

## 2023-07-14 RX ADMIN — POTASSIUM & SODIUM PHOSPHATES POWDER PACK 280-160-250 MG 2 PACKET: 280-160-250 PACK at 10:07

## 2023-07-14 RX ADMIN — HYDROXYZINE HYDROCHLORIDE 50 MG: 25 TABLET, FILM COATED ORAL at 02:07

## 2023-07-14 RX ADMIN — PANTOPRAZOLE SODIUM 40 MG: 40 INJECTION, POWDER, FOR SOLUTION INTRAVENOUS at 09:07

## 2023-07-14 RX ADMIN — LEVETIRACETAM 1000 MG: 100 INJECTION, SOLUTION INTRAVENOUS at 08:07

## 2023-07-14 RX ADMIN — MAGNESIUM SULFATE 2 G: 2 INJECTION INTRAVENOUS at 05:07

## 2023-07-14 RX ADMIN — POTASSIUM BICARBONATE 20 MEQ: 391 TABLET, EFFERVESCENT ORAL at 08:07

## 2023-07-14 RX ADMIN — POTASSIUM BICARBONATE 20 MEQ: 391 TABLET, EFFERVESCENT ORAL at 09:07

## 2023-07-14 RX ADMIN — AMOXICILLIN 1000 MG: 500 CAPSULE ORAL at 08:07

## 2023-07-14 RX ADMIN — ACETYLCYSTEINE 2 ML: 200 SOLUTION ORAL; RESPIRATORY (INHALATION) at 03:07

## 2023-07-14 RX ADMIN — CLARITHROMYCIN 500 MG: 500 TABLET, FILM COATED ORAL at 09:07

## 2023-07-14 RX ADMIN — IPRATROPIUM BROMIDE AND ALBUTEROL SULFATE 3 ML: .5; 3 SOLUTION RESPIRATORY (INHALATION) at 03:07

## 2023-07-14 RX ADMIN — IPRATROPIUM BROMIDE AND ALBUTEROL SULFATE 3 ML: .5; 3 SOLUTION RESPIRATORY (INHALATION) at 05:07

## 2023-07-14 RX ADMIN — PANTOPRAZOLE SODIUM 40 MG: 40 INJECTION, POWDER, FOR SOLUTION INTRAVENOUS at 08:07

## 2023-07-14 NOTE — PLAN OF CARE
[facility]  Discharge Reassessment    Primary Care Provider: Viktor Ross MD    Expected Discharge Date: 7/18/2023    Patient not medically ready to discharge per MD.      dextrose 5 % and 0.45 % NaCl           Reassessment (most recent)       Discharge Reassessment - 07/14/23 1036          Discharge Reassessment    Assessment Type Discharge Planning Reassessment     Communicated BRAYAN with patient/caregiver Date not available/Unable to determine     Discharge Plan A Skilled Nursing Facility     Discharge Plan B Home with family;Home Health     DME Needed Upon Discharge  other (see comments)   TBD    Transition of Care Barriers None     Why the patient remains in the hospital Requires continued medical care        Post-Acute Status    Post-Acute Authorization Placement     Post-Acute Placement Status Pending medical clearance/testing     Coverage [employer] MANAGED MEDICARE - [employer] MED[employer]ARE HMO     Discharge Delays None known at this time                   Lizz Lopez RN     [phone]

## 2023-07-14 NOTE — SUBJECTIVE & OBJECTIVE
Interval History/Significant Events: Wean comfort flow as tolerated. Ensure patient is working with PT/OT/Speech for continuous improvement.     Review of Systems   Unable to perform ROS: Patient unresponsive   Objective:     Vital Signs (Most Recent):  Temp: 99 °F (37.2 °C) (07/14/23 0705)  Pulse: (!) 113 (07/14/23 1151)  Resp: 16 (07/14/23 1151)  BP: (!) 116/58 (07/11/23 0200)  SpO2: 97 % (07/14/23 1151) Vital Signs (24h Range):  Temp:  [99 °F (37.2 °C)-99.7 °F (37.6 °C)] 99 °F (37.2 °C)  Pulse:  [] 113  Resp:  [8-35] 16  SpO2:  [90 %-100 %] 97 %  Arterial Line BP: ()/(50-70) 157/70   Weight: 59.4 kg (130 lb 15.3 oz)  Body mass index is 23.95 kg/m².      Intake/Output Summary (Last 24 hours) at 7/14/2023 1210  Last data filed at 7/14/2023 0600  Gross per 24 hour   Intake 709.81 ml   Output 770 ml   Net -60.19 ml            Physical Exam  Constitutional:       Appearance: She is ill-appearing and toxic-appearing.   HENT:      Head: Normocephalic.   Eyes:      General: Scleral icterus present.   Neck:      Thyroid: No thyroid mass or thyroid tenderness.      Vascular: No carotid bruit or hepatojugular reflux.   Cardiovascular:      Rate and Rhythm: Normal rate.      Pulses: Decreased pulses.      Heart sounds: Heart sounds are distant.   Chest:      Chest wall: No mass, lacerations or deformity.   Breasts:     Right: No swelling.      Left: No swelling.   Abdominal:      General: Abdomen is flat.      Palpations: Abdomen is rigid.      Hernia: No hernia is present.   Genitourinary:     Vagina: Normal.   Musculoskeletal:      Cervical back: Rigidity present.   Lymphadenopathy:      Cervical: No cervical adenopathy.   Skin:     Coloration: Skin is jaundiced.      Findings: Ecchymosis present.   Neurological:      Mental Status: She is unresponsive.          Vents:  Vent Mode: (S) Spont (07/11/23 1154)  Ventilator Initiated: Yes (07/05/23 1221)  Set Rate: 18 BPM (07/11/23 1135)  Vt Set: 300 mL (07/11/23  1135)  Pressure Support: 5 cmH20 (07/11/23 1154)  PEEP/CPAP: 5 cmH20 (07/11/23 1154)  Oxygen Concentration (%): 40 (07/14/23 1151)  Peak Airway Pressure: 10 cmH20 (07/11/23 1154)  Plateau Pressure: 0 cmH20 (07/11/23 1154)  Total Ve: 6.51 L/m (07/11/23 1154)  Negative Inspiratory Force (cm H2O): 0 (07/11/23 1154)  F/VT Ratio<105 (RSBI): (!) 41.56 (07/11/23 1154)  Lines/Drains/Airways       Drain  Duration                  Urethral Catheter 07/05/23 1332 Double-lumen 8 days         Fecal Incontinence  07/09/23 2300 4 days         NG/OG Tube 07/13/23 1311 [place] sump 18 Fr. Right nostril <1 day              Arterial Line  Duration             Arterial Line 07/07/23 0232 Left Radial 7 days              Peripheral Intravenous Line  Duration                  Peripheral IV - Single Lumen 07/11/23 1800 20 G Left Upper Arm 2 days         Peripheral IV - Single Lumen 07/12/23 1148 20 G;1 3/4 in Left Forearm 2 days                  Significant Labs:    CBC/Anemia Profile:  Recent Labs   Lab 07/13/23  0857 07/13/23  2026 07/14/23  0752   WBC 5.18 4.92 7.35   HGB 8.7* 8.1* 8.6*   HCT 27.7* 25.8* 27.2*   PLT 65* 57* 63*   MCV 98 99* 98   RDW 23.5* 24.3* 24.3*          Chemistries:  Recent Labs   Lab 07/13/23  0343 07/13/23  1702 07/14/23  0407    144 145   K 3.5 3.8 3.6   * 116* 118*   CO2 20* 19* 18*   BUN 9 9 8   CREATININE 0.4* 0.4* 0.4*   CALCIUM 8.1* 8.1* 8.1*   ALBUMIN 2.6* 2.7* 2.7*   PROT 4.8* 4.9* 4.9*   BILITOT 3.5* 3.7* 4.0*   ALKPHOS 71 75 82   ALT 11 11 10   AST 25 28 26   MG 1.8  --  1.7   PHOS 2.8 1.8* 2.4*         All pertinent labs within the past 24 hours have been reviewed.    Significant Imaging:  I have reviewed all pertinent imaging results/findings within the past 24 hours.

## 2023-07-14 NOTE — PLAN OF CARE
Problem: Occupational Therapy  Goal: Occupational Therapy Goal  Description: Goals to be met by: 7/28/2023     Patient will increase functional independence with ADLs by performing:    UE Dressing with Minimal Assistance.  LE Dressing with Moderate Assistance.  Grooming while EOB with Minimal Assistance.  Toileting from bedside commode with Maximum Assistance for hygiene and clothing management.   Supine to sit with Moderate Assistance.  Stand pivot transfers with Maximum Assistance with or without AD.  Toilet transfer to bedside commode with Maximum Assistance with or without AD.    Outcome: Ongoing, Progressing     Pt evaluated and OT goals established.

## 2023-07-14 NOTE — ASSESSMENT & PLAN NOTE
Nutrition consulted. Most recent weight and BMI monitored-     Measurements:  Wt Readings from Last 1 Encounters:   07/13/23 59.4 kg (130 lb 15.3 oz)   Body mass index is 23.95 kg/m².    Patient has been screened and assessed by RD.    Malnutrition Type:  Context: social/environmental circumstances  Level: severe    Malnutrition Characteristic Summary:  Energy Intake (Malnutrition): less than or equal to 75% for greater than or equal to 1 month  Subcutaneous Fat (Malnutrition): severe depletion  Muscle Mass (Malnutrition): severe depletion    Interventions/Recommendations (treatment strategy):  1.     Plan  -Initiating tube feeds with above formulation  --NG tube placed and tube feedings initiated

## 2023-07-14 NOTE — DISCHARGE SUMMARY
Discharge Summary  Hospital Medicine    Attending Provider on Discharge: Renee Arciniega MD  Hospital Medicine Team: [facility]  Date of Admission:  5/28/2023     Date of Discharge:  6/30/2023  Code status: DNR (Do Not Resuscitate)    Active Hospital Problems    Diagnosis  POA    *Non-convulsive status epilepticus [G40.901]  No     Priority: 1 - High    Acute metabolic encephalopathy [G93.41]  Yes     Priority: 3     Hepatic encephalopathy [K76.82]  Yes     Priority: 3     Deep vein thrombosis (DVT) of femoral vein of right lower extremity [I82.411]  Yes     Priority: 4     Acute deep vein thrombosis (DVT) of brachial vein of right upper extremity [I82.621]  Yes     Priority: 4     Acute blood loss anemia [D62]  Yes     Priority: 5     Retroperitoneal bleed [R58]  Yes     Priority: 5     Thrombocytopenia [D69.6]  Yes     Priority: 5     Acute liver failure with hepatic coma [K72.01]  Yes     Priority: 6     Alcohol abuse, in remission [F10.11]  Yes     Priority: 7      Has addiction psych clearance from Dr Crain for liver tx      Bipolar 1 disorder [F31.9]  Yes     Priority: 9     Hypotension [I95.9]  Yes    Abrasion of nose [S00.31XA]  No    Atelectasis [J98.11]  Yes    Internal jugular (IJ) vein thromboembolism, acute, right [I82.C11]  No    Coagulopathy [D68.9]  Yes    Hypotension due to hypovolemia [I95.89, E86.1]  No    Hyperammonemia [E72.20]  Yes    Severe protein-calorie malnutrition [E43]  Yes      Resolved Hospital Problems    Diagnosis Date Resolved POA    Acute respiratory failure with hypoxia [J96.01] 07/01/2023 Yes     Priority: 2     Urinary tract infection due to Proteus [N39.0, B96.4] 07/01/2023 Yes     Priority: 3     Endotracheally intubated [Z97.8] 06/23/2023 No    Hypovolemic shock [R57.1] 06/20/2023 Yes    Hemorrhagic shock [R57.8] 06/20/2023 No    Hypernatremia [E87.0] 06/15/2023 Yes    Breakthrough seizure [G40.919] 06/28/2023 Yes     In responsive to benzo withdrawl       HPI  Patient is  a 58 y.o.female w/ PMHx of EtOH induced cirrhosis, seizure on AEDs, and bipolar disorder on lithium who initially presented to [facility] on 5/18/2023 with a primary complaint of altered mental status suspected to be from hepatic encephalopathy versus hypercalcemia who despite correction of her calcium and treatment with lactulose and rifaximin continued to be encephalopathic.  Given patient's persistent encephalopathy, there was a concern that she had uncontrolled hepatic encephalopathy and was transferred to [facility] for further evaluation.    During patient's hospitalization at [facility], she was noted to be hypercalcemic likely secondary to her lithium use for her bipolar disorder.  She was started on aripiprazole at that time.  Given her inability to follow commands, she had an NG-tube placed for her to safely accept medicines.  Upon arrival here, her NG tube was essentially dislodged and subsequently removed.  On bedside swallow assessment, patient was able to follow commands and swallow safely.  Additionally, on arrival patient had a small bowel movement.  When asked where she was, remained silent.  She later shared unprompted that she has a history of glaucoma.  Patient later stated her full name.  She was able to follow simple commands, but not 2 step commands.    Hospital Course  * Non-convulsive status epilepticus  Acute metabolic encephalopathy  58F with EtOH induced hepatic cirrhosis, seizure disorder on outpatient LEV and ZNS and bipolar disorder admitted on 5/28 for persistent encephalopathy. MRI Brain WO was unremarkable for acute neurologic change. EEG w/ frequent left hemispheric electrographic seizures and NCSE. Treated with levetiracetam 1 g BID, lactulose and rifaximin. Hypercalcemia treated by holding lithium. Proteus mirabilis UTI treated with antibiotic course. Improved to where she HAD no epileptiform discharges on EEG x 24.     Acute metabolic  encephalopathy  Multifactorial  Hepatic encephalopathy  Proteus mirabilis UTI  Non-convulsive status epilepticus  Improving  Delirium precautions    Hepatic encephalopathy  Chronic, unstable. Persistent confusion, grade 2  Elevated ammonia on admission. Started on lactulose and rifaximin. Ammonia followed and is now at normal levels. Goal for BM > 3 a day. Continued on lactulose and rifaxamin.     Acute deep vein thrombosis (DVT) of brachial vein of right upper extremity  Deep vein thrombosis (DVT) of femoral vein of right lower extremity  Right IJ DVT  Initially treated with argatroban and then transitioned to heparin. Stopped due to development of retroperitoneal hemorrhage. IVC filter placed 6/14. Anticoagulation held since then due to thrombocytpoenia,     Acute blood loss anemia  Large Right retroperitoneal bleed  Hypovolemic shock  Anemia of chronic disease  Received about 9 units PRBCs, 11 platelet doses, 3 FFP, 2 cryoprecipitate  Patient had embolization of left lumbar and internal iliac branch by IR 6/13  Hgb stable in 8s. Previously on epoietin    Thrombocytopenia  Chronic issue due to liver failure  S/p  11 platelet doses, 3 FFP, 2 cryoprecipitate    Acute liver failure with hepatic coma  Ascites  MELD-Na: 21 at 6/30/2023  4:39 AM  MELD: 21 at 6/30/2023  4:39 AM  Calculated from:  Serum Creatinine: 0.4 mg/dL (Using min of 1 mg/dL) at 6/30/2023  4:39 AM  Serum Sodium: 137 mmol/L at 6/30/2023  4:39 AM  Total Bilirubin: 6.6 mg/dL at 6/30/2023  4:39 AM  INR(ratio): 1.9 at 6/30/2023  4:39 AM    Resume furosemide 40 mg BID and spironolactone 50 mg qhs  Refer patient back to outpatient hepatology    Alcohol abuse, in remission  PETH pending at time fo discharge    Bipolar 1 disorder  Psychiatry consulted and assisted in adjusting medications  Psychiatry re-consulted to consider restarting home medications  Continue olanzapine 15 mg qhs and trazodone 50 mg qhs  Lithium on hold due to slight elevation and  associated hypercalcemia  She was restarted on lithium 150 mg qhs as it is reportedly the most successful treatment for patient's Bipolar.      Severe protein-calorie malnutrition  Nutrition consulted. Most recent weight and BMI monitored-     Measurements:  Wt Readings from Last 1 Encounters:   06/29/23 59.9 kg (132 lb 0.9 oz)   Body mass index is 24.15 kg/m².    Patient has been screened and assessed by RD.    Malnutrition Type:  Context: social/environmental circumstances  Level: severe    Malnutrition Characteristic Summary:  Weight Loss (Malnutrition):  (23% x 4 months)  Energy Intake (Malnutrition): less than or equal to 75% for greater than or equal to 1 month  Subcutaneous Fat (Malnutrition): severe depletion (suspecting)  Muscle Mass (Malnutrition): severe depletion  Fluid Accumulation (Malnutrition): other (see comments) (mild-moderate)    Interventions/Recommendations (treatment strategy):  1. Continue Dysphagia soft diet, texture per SLP. 2. Add Boost Plus ONS & encourage PO intake as tolerated. 3. RD to monitor & follow-up.    Macrocytic anemia       Procedures: none    Consultants: psychiatry, interventional radiology, critical care medicine, GI, hematology-oncology, hepatology    Discharge Medication List as of 6/30/2023  8:26 PM        START taking these medications    Details   lithium carbonate 150 MG capsule Take 1 capsule (150 mg total) by mouth every evening., Starting Fri 6/30/2023, Until Sat 6/29/2024, Normal      spironolactone (ALDACTONE) 50 MG tablet Take 1 tablet (50 mg total) by mouth once daily., Starting Sat 7/1/2023, Until Sun 6/30/2024, Normal      bacitracin zinc 500 unit/gram OiPk Apply topically 3 (three) times daily. for 14 days, Starting Fri 6/30/2023, Until Fri 7/14/2023, No Print      furosemide (LASIX) 40 MG tablet Take 1 tablet (40 mg total) by mouth 2 (two) times daily., Starting Fri 6/30/2023, Until Sat 6/29/2024, Normal           CONTINUE these medications which have  CHANGED    Details   OLANZapine (ZYPREXA) 10 MG tablet Take 1 tablet (10 mg total) by mouth every evening., Starting Fri 6/30/2023, Until Sat 6/29/2024, Normal      rifAXIMin (XIFAXAN) 550 mg Tab Take 1 tablet (550 mg total) by mouth 2 (two) times daily., Starting Fri 6/30/2023, Normal      hydrOXYzine (ATARAX) 50 MG tablet Take 1 tablet (50 mg total) by mouth nightly as needed for Itching or Anxiety (or insomnia)., Starting Fri 6/30/2023, No Print      lactulose (CHRONULAC) 20 gram/30 mL Soln 30 mLs (20 g total) by Per NG tube route 3 (three) times daily. Always give first dose in AM. Hold PM dose(s) if has had 3 BMs by them, Starting Fri 6/30/2023, No Print           CONTINUE these medications which have NOT CHANGED    Details   folic acid (FOLVITE) 1 MG tablet Take 1 tablet (1 mg total) by mouth once daily., Starting Mon 2/13/2023, Until Tue 2/13/2024, Normal      levETIRAcetam (KEPPRA) 1000 MG tablet Take 1,000 mg by mouth 2 (two) times daily., Historical Med      propranoloL (INDERAL) 40 MG tablet Take 20 mg by mouth once daily., Starting Sat 12/3/2022, Historical Med           STOP taking these medications       traZODone (DESYREL) 50 MG tablet Comments:   Reason for Stopping:         zonisamide (ZONEGRAN) 100 MG Cap Comments:   Reason for Stopping:             WOUND CARE:  Wound spray or saline for wound cleaning with all dressing changes.    All wounds to be measured with first dressing changes and every week.     Nasal bridge - Q5 days/PRN - change out foam dressing to bridge of pts nose.     groin, sacrum and posterior thighs - BID/PRN - Apply Triad to BL groin, sacrum and posterior thighs. Keep pt clean and dry, no diapers.     Bilateral arms and sternum - Q3 days/PRN for saturation - skin tears  - cleanse w/ NS, pat dry. Apply piece of Xeroform or Petroleum gauze as a non-adherent layer and secure w/ foam dressing or island boarder. Keep arms elevated.    Discharge Diet:2 gram sodium diet     Activity:  activity as tolerated    Discharge Condition: Good    Disposition: Home or Self Care    Tests pending at the time of discharge: none      Time spent  on the discharge of the patient including review of hospital course with the patient. reviewing discharge medications and arranging follow-up care 35 min    Discharge examination Patient was seen and examined on the date of discharge and determined to be suitable for discharge.    Discharge plan   hepatology    Renee Arciniega MD

## 2023-07-14 NOTE — PROGRESS NOTES
[facility] - Cardiac Medical ICU  Critical Care Medicine  Progress Note    Patient Name: Marely Hamilton  MRN: [id number]  Admission Date: 7/5/2023  Hospital Length of Stay: 9 days  Code Status: DNR  Attending Provider: Cody Brothers MD  Primary Care Provider: Viktor Ross MD   Principal Problem: Gastrointestinal hemorrhage associated with duodenal ulcer    Subjective:     HPI:  Marely Hamilton is a 58 y.o. female with history of alcoholic cirrhosis, seizure disorder, DVT and IJ thrombus with recent admission for large retroperitoneal hemorrhage requiring IR embolization and IVC filter placement who presents for hematochezia. Onset this morning at [facility], alida blood per rectum per chart, sent to ED. Initially normotensive but then severe hypotension prompted initiation of levophed and intubation. Hgb 6.8, 2u pRBC given + 1u FFP ordered. Hgb 8.5 on 7/2. On levo and vaso currently. K 6.6 but renal function at baseline. Repeat pending. No prior EGDs on record.      Hospital/ICU Course:  Patient was seen at bedside, hematochezia still present post op by IR. Patient has required many transfusions of pRBCs and platelets due to ongoing down trending of Hgb. No clear source of ongoing hemorrhage by imaging. Patient has continued to require pressors to maintain MAP >65. Discussed with GI and IR regarding active bleeding post op, IR indicated there is not much else to do from their end bc they embolized a significant part of GDA. Pt is off of pressor requirement and not actively bleeding. GI re evaluated pt via EGD and found no sources of active bleeding. Pt is extubated and currently on BIPAP requirement due to de saturation in the 80's. Pt is to be seen by speech and PT/OT. Clear mucinous secretions via suction, chest physiotherapy, and cough assist device. Nebs to help with breathing as well. Pt is off BIPAP and currently on comfort flow. Triple therapy (amoxicillin, clarithromycin) started for 14 days to treat for positive H  pylori serology. Pt also had a NG tube placed so we may begin tube feedings. CxR, EKG, and ABG displayed no reason for tachycardia. Pt becomes anxious when seen by different provider teams. Remove art line and consult for midline placement. Continue to wean comfort flow as tolerated. Ensure pt is working with PT/OT/Speech for continuous improvement.      Interval History/Significant Events: Wean comfort flow as tolerated. Ensure patient is working with PT/OT/Speech for continuous improvement.     Review of Systems   Unable to perform ROS: Patient unresponsive   Objective:     Vital Signs (Most Recent):  Temp: 99 °F (37.2 °C) (07/14/23 0705)  Pulse: (!) 113 (07/14/23 1151)  Resp: 16 (07/14/23 1151)  BP: (!) 116/58 (07/11/23 0200)  SpO2: 97 % (07/14/23 1151) Vital Signs (24h Range):  Temp:  [99 °F (37.2 °C)-99.7 °F (37.6 °C)] 99 °F (37.2 °C)  Pulse:  [] 113  Resp:  [8-35] 16  SpO2:  [90 %-100 %] 97 %  Arterial Line BP: ()/(50-70) 157/70   Weight: 59.4 kg (130 lb 15.3 oz)  Body mass index is 23.95 kg/m².      Intake/Output Summary (Last 24 hours) at 7/14/2023 1210  Last data filed at 7/14/2023 0600  Gross per 24 hour   Intake 709.81 ml   Output 770 ml   Net -60.19 ml            Physical Exam  Constitutional:       Appearance: She is ill-appearing and toxic-appearing.   HENT:      Head: Normocephalic.   Eyes:      General: Scleral icterus present.   Neck:      Thyroid: No thyroid mass or thyroid tenderness.      Vascular: No carotid bruit or hepatojugular reflux.   Cardiovascular:      Rate and Rhythm: Normal rate.      Pulses: Decreased pulses.      Heart sounds: Heart sounds are distant.   Chest:      Chest wall: No mass, lacerations or deformity.   Breasts:     Right: No swelling.      Left: No swelling.   Abdominal:      General: Abdomen is flat.      Palpations: Abdomen is rigid.      Hernia: No hernia is present.   Genitourinary:     Vagina: Normal.   Musculoskeletal:      Cervical back: Rigidity  present.   Lymphadenopathy:      Cervical: No cervical adenopathy.   Skin:     Coloration: Skin is jaundiced.      Findings: Ecchymosis present.   Neurological:      Mental Status: She is unresponsive.          Vents:  Vent Mode: (S) Spont (07/11/23 1154)  Ventilator Initiated: Yes (07/05/23 1221)  Set Rate: 18 BPM (07/11/23 1135)  Vt Set: 300 mL (07/11/23 1135)  Pressure Support: 5 cmH20 (07/11/23 1154)  PEEP/CPAP: 5 cmH20 (07/11/23 1154)  Oxygen Concentration (%): 40 (07/14/23 1151)  Peak Airway Pressure: 10 cmH20 (07/11/23 1154)  Plateau Pressure: 0 cmH20 (07/11/23 1154)  Total Ve: 6.51 L/m (07/11/23 1154)  Negative Inspiratory Force (cm H2O): 0 (07/11/23 1154)  F/VT Ratio<105 (RSBI): (!) 41.56 (07/11/23 1154)  Lines/Drains/Airways       Drain  Duration                  Urethral Catheter 07/05/23 1332 Double-lumen 8 days         Fecal Incontinence  07/09/23 2300 4 days         NG/OG Tube 07/13/23 1311 [place] sump 18 Fr. Right nostril <1 day              Arterial Line  Duration             Arterial Line 07/07/23 0232 Left Radial 7 days              Peripheral Intravenous Line  Duration                  Peripheral IV - Single Lumen 07/11/23 1800 20 G Left Upper Arm 2 days         Peripheral IV - Single Lumen 07/12/23 1148 20 G;1 3/4 in Left Forearm 2 days                  Significant Labs:    CBC/Anemia Profile:  Recent Labs   Lab 07/13/23  0857 07/13/23  2026 07/14/23  0752   WBC 5.18 4.92 7.35   HGB 8.7* 8.1* 8.6*   HCT 27.7* 25.8* 27.2*   PLT 65* 57* 63*   MCV 98 99* 98   RDW 23.5* 24.3* 24.3*          Chemistries:  Recent Labs   Lab 07/13/23  0343 07/13/23  1702 07/14/23  0407    144 145   K 3.5 3.8 3.6   * 116* 118*   CO2 20* 19* 18*   BUN 9 9 8   CREATININE 0.4* 0.4* 0.4*   CALCIUM 8.1* 8.1* 8.1*   ALBUMIN 2.6* 2.7* 2.7*   PROT 4.8* 4.9* 4.9*   BILITOT 3.5* 3.7* 4.0*   ALKPHOS 71 75 82   ALT 11 11 10   AST 25 28 26   MG 1.8  --  1.7   PHOS 2.8 1.8* 2.4*         All pertinent labs within  the past 24 hours have been reviewed.    Significant Imaging:  I have reviewed all pertinent imaging results/findings within the past 24 hours.      ABG  Recent Labs   Lab 07/13/23  1044   PH 7.429   PO2 150*   PCO2 34.5*   HCO3 22.9*   BE -1     Assessment/Plan:     Oncology  Acute blood loss anemia  - Maintain IV access with 2 large bore Ivs  - Intravascular resuscitation/support with IVFs   - Hold all NSAIDs and anticoagulants, unless contraindicated.  - Please correct any coagulopathy with platelets and FFP for goal of platelets >50K and INR <2.0  - Please notify GI team if there is significant change in patient's clinical status  - To date, patient has required at least 21 units of pRBCs to maintain Hgb >7         Endocrine  Severe protein-calorie malnutrition  Nutrition consulted. Most recent weight and BMI monitored-     Measurements:  Wt Readings from Last 1 Encounters:   07/13/23 59.4 kg (130 lb 15.3 oz)   Body mass index is 23.95 kg/m².    Patient has been screened and assessed by RD.    Malnutrition Type:  Context: social/environmental circumstances  Level: severe    Malnutrition Characteristic Summary:  Energy Intake (Malnutrition): less than or equal to 75% for greater than or equal to 1 month  Subcutaneous Fat (Malnutrition): severe depletion  Muscle Mass (Malnutrition): severe depletion    Interventions/Recommendations (treatment strategy):  1.     Plan  -Initiating tube feeds with above formulation  --NG tube placed and tube feedings initiated      GI  * Gastrointestinal hemorrhage associated with duodenal ulcer  See above     H. pylori infection  -- See above     Hematochezia  - GI EGD clipped duodenal ulcers for IR reference  - Transfuse blood products for active bleeding   - trend CBC and follow  - IR embolized GDA for duodenal hyperemia   -- GI re evaluated site of duodenal ulcers, no noted active bleeding  -- Continue PPI BID for 8 weeks, then once daily after that  --Pt had positive serology  for H pylori, begin triple therapy with clarithromycin,amoxicillin, and PPI for 14 days    Other  Retroperitoneal bleed  - CT-A demonstrated stable retroperitoneal hematoma. No need for intervention at this time.       Critical Care Daily Checklist:    A: Awake: RASS Goal/Actual Goal: RASS Goal: 0-->alert and calm  Actual:     B: Spontaneous Breathing Trial Performed? Spon. Breathing Trial Initiated?: Initiated (07/11/23 5729)   C: SAT & SBT Coordinated?  n/a                      D: Delirium: CAM-ICU Overall CAM-ICU: Positive   E: Early Mobility Performed? Yes   F: Feeding Goal: Goals: Will meet % EEN/EPN by next RD f/u.  Status: Nutrition Goal Status: goal not met   Current Diet Order   Procedures    Diet NPO      AS: Analgesia/Sedation n/a   T: Thromboembolic Prophylaxis n/a   H: HOB > 300 Yes   U: Stress Ulcer Prophylaxis (if needed) PPI   G: Glucose Control SSI   B: Bowel Function Stool Occurrence: 1   I: Indwelling Catheter (Lines & Baugh) Necessity baugh   D: De-escalation of Antimicrobials/Pharmacotherapies n/a    Plan for the day/ETD Wean oxygen, continue working with PT/OT    Code Status:  Family/Goals of Care: DNR  n/a       Critical secondary to Patient has a condition that poses threat to life and bodily function: Hemorrhagic shock       Critical care was time spent personally by me on the following activities: development of treatment plan with patient or surrogate and bedside caregivers, discussions with consultants, evaluation of patient's response to treatment, examination of patient, ordering and performing treatments and interventions, ordering and review of laboratory studies, ordering and review of radiographic studies, pulse oximetry, re-evaluation of patient's condition. This critical care time did not overlap with that of any other provider or involve time for any procedures.     Phong Jean,   Critical Care Medicine  [facility] - Cardiac Medical ICU

## 2023-07-14 NOTE — PLAN OF CARE
Problem: Physical Therapy  Goal: Physical Therapy Goal  Description: Goals to be met by: 8/10/23     Patient will increase functional independence with mobility by performin. Supine to sit with Moderate Assistance  2. Rolling to Left  with Minimal Assistance.  3. Sit to stand transfer with Moderate Assistance  4. Bed to chair transfer with Maximum Assistance   5. Sitting at edge of bed x10 minutes with Contact Guard Assistance to perform functional activities.   6. Lower extremity exercise program x10 reps per handout, with assistance as needed to improve strength for functional activities.     Outcome: Ongoing, Progressing   Evaluation completed and goals appropriate. 2023

## 2023-07-14 NOTE — PT/OT/SLP EVAL
Physical Therapy  Co-Evaluation and treatment with OT    Patient Name:  Marely Hamilton   MRN:  [id number]    Recommendations:     Discharge Recommendations: nursing facility, skilled   Discharge Equipment Recommendations: will determine DME needs closer to discharge  Barriers to discharge: Inaccessible home and Decreased caregiver support    Assessment:     Marely Hamilton is a 58 y.o. female admitted with a medical diagnosis of Gastrointestinal hemorrhage associated with duodenal ulcer.  She presents with the following impairments/functional limitations: weakness, impaired balance, decreased safety awareness, impaired endurance, impaired functional mobility, decreased lower extremity function, decreased coordination, impaired cognition pt tolerated treatment well but is very deconditioned and had slight increased HR with treatment. Pt will benefit from skilled PT 3x/wk to progress physically.  Pt is significantly below previous functional level, increased risk of falls and increased burden of care currently. Pt will need SNF placement when medically stable to maximize rehab potential. Pt came to ED from SNF with hematochezia.       Rehab Prognosis: Good; patient would benefit from acute skilled PT services to address these deficits and reach maximum level of function.    Recent Surgery: Procedure(s) (LRB):  EGD (ESOPHAGOGASTRODUODENOSCOPY) (N/A) 3 Days Post-Op    Plan:     During this hospitalization, patient to be seen 3 x/week to address the identified rehab impairments via therapeutic activities, therapeutic exercises, neuromuscular re-education and progress toward the following goals:    Plan of Care Expires:  08/10/23    Subjective     Chief Complaint: pt had no complaints during treatment and was minimally verbal during treatment.   Patient/Family Comments/goals: to get better  Pain/Comfort:  Pain Rating 1: 0/10  Pain Rating Post-Intervention 1: 0/10    Patients cultural, spiritual, Orthodoxy conflicts given  the current situation: no    Living Environment:  Per pt sister on the phone: pt was living with her mother (has passed away) in 1 story with 4 steps and R handrail.   Prior to admission, patients level of function was Independent.  Equipment used at home:wheelchair, bedside commode, bath bench, grab bar (walk in shower with grab bars)   Barriers to discharge: Inaccessible home and Decreased caregiver support  .  DME owned (not currently used): none.  Upon discharge, patient will have assistance from unknown. Pt sister lives in [facility] .    Objective:     Communicated with nurse  prior to session.  Patient found supine with telemetry, arterial line, pulse ox (continuous), NG tube, oxygen  upon PT entry to room.    General Precautions: Standard, fall  Orthopedic Precautions:    Braces:    Respiratory Status: Comfort flow, flow 35 L/min, concentration 42%    Exams:  Cognitive Exam:  Patient is oriented to name only  RLE ROM: WFL  RLE Strength: 1/5 for major muscle groups  LLE ROM: WFL  LLE Strength: 1/5 for major muscle groups    Functional Mobility:  Bed Mobility:  pt needed verbal cues for hand placement and sequencing for functional mobility.   Rolling Left:  total assistance and of 2 persons  Rolling Right: total assistance and of 2 persons  Supine to Sit: total assistance and of 2 persons  Sit to Supine: total assistance and of 2 persons    Balance: pt sat on EOB x 5 min with CGA to min assist for sitting balance. Pt leaned to R side with sitting and was min assist to weight shift center of gravity over base of support. Pt sat with kyphotic posture. With improved sitting posture, pt was able to perform ADLS with OT while sitting on EOB.     Due to pt complex medical condition, the skill of 2 licensed therapists is needed to maximize treatment session and progression towards goals  Pt white board updated with current therapists name and level of mobility assistance needed.         AM-PAC 6 CLICK  MOBILITY  Total Score:8       Treatment & Education:  Pt received verbal instructions in role of PT and POC. Pt verbally expressed understanding of such.     Patient left left sidelying with all lines intact, call button in reach, and RN  present.    GOALS:   Multidisciplinary Problems       Physical Therapy Goals          Problem: Physical Therapy    Goal Priority Disciplines Outcome Goal Variances Interventions   Physical Therapy Goal     PT, PT/OT Ongoing, Progressing     Description: Goals to be met by: 8/10/23     Patient will increase functional independence with mobility by performin. Supine to sit with Moderate Assistance  2. Rolling to Left  with Minimal Assistance.  3. Sit to stand transfer with Moderate Assistance  4. Bed to chair transfer with Maximum Assistance   5. Sitting at edge of bed x10 minutes with Contact Guard Assistance to perform functional activities.   6. Lower extremity exercise program x10 reps per handout, with assistance as needed to improve strength for functional activities.                          History:     Past Medical History:   Diagnosis Date    Addiction to drug     Alcohol abuse     Alcohol abuse, in remission 6/15/2015    14.5 weeks ago; AA weekly    Anemia     Anxiety 6/15/2015    Behavioral problem     Bipolar disorder     Bipolar disorder in remission 6/15/2015    Cirrhosis, Laennec's 6/15/2015    Depression     Encounter for blood transfusion     Epistaxis 6/15/2015    Fatigue     Glaucoma     Hematuria     Hepatic encephalopathy 6/15/2015    Hepatic enlargement     History of psychiatric care     History of psychiatric hospitalization     History of seizure 6/15/2015    1    hx of intentional Tylenol overdose 6/15/2015    2005- situational and hx of bipolar    Hx of psychiatric care     Macrocytic anemia 2015    6 units PRBC since 2015    Jeana     Osteoarthritis     Other ascites 6/15/2015    Psychiatric exam requested by authority     Psychiatric  problem     Psychosis 9/26/2019    Renal disorder     Seizures     Self-harming behavior     Suicide attempt     Therapy     Thrombocytopenia 6/15/2015       Past Surgical History:   Procedure Laterality Date    COSMETIC SURGERY      EMBOLIZATION N/A 6/11/2023    Procedure: EMBOLIZATION, BLOOD VESSEL;  Surgeon: Debbie Surgeon;  [facility]: [facility];  Service: Anesthesiology;  Laterality: N/A;    ESOPHAGOGASTRODUODENOSCOPY      ESOPHAGOGASTRODUODENOSCOPY N/A 7/6/2023    Procedure: EGD (ESOPHAGOGASTRODUODENOSCOPY);  Surgeon: Bernice Guillen MD;  Location: [address]);  Service: Endoscopy;  Laterality: N/A;    ESOPHAGOGASTRODUODENOSCOPY N/A 7/11/2023    Procedure: EGD (ESOPHAGOGASTRODUODENOSCOPY);  Surgeon: Rangel Broussard MD;  Location: [address]);  Service: Endoscopy;  Laterality: N/A;       Time Tracking:     PT Received On: 07/14/23  PT Start Time: 1036     PT Stop Time: 1058  PT Total Time (min): 22 min     Billable Minutes: Evaluation 10 min  and Therapeutic Activity 12 min       07/14/2023

## 2023-07-14 NOTE — PT/OT/SLP EVAL
Occupational Therapy   Evaluation and Treatment    Co-eval with PT to have 2 skilled therapists present to safely assess pt's functional mobility.     Name: Marely Hamilton  MRN: [id number]  Admitting Diagnosis: Gastrointestinal hemorrhage associated with duodenal ulcer  Recent Surgery: Procedure(s) (LRB):  EGD (ESOPHAGOGASTRODUODENOSCOPY) (N/A) 3 Days Post-Op    Recommendations:     Discharge Recommendations: nursing facility, skilled  Discharge Equipment Recommendations:  to be determined by next level of care  Barriers to discharge:  Decreased caregiver support, Inaccessible home environment (significant skilled assistance required for ADLs and mobility)    Assessment:     Marely Hamilton is a 58 y.o. female with a medical diagnosis of Gastrointestinal hemorrhage associated with duodenal ulcer.  Pt demonstrated good participation during session, but she requires significant assistance for ADLs and mobility.  Due to her current level of function compared to her PLOF and her home environment, SNF recommended at d/c for maximal pt gains in functional independence.  She presents with the following.  Performance deficits affecting function: weakness, impaired endurance, impaired self care skills, impaired functional mobility, gait instability, impaired balance, impaired skin, impaired cognition, decreased coordination, edema, decreased upper extremity function, decreased lower extremity function, impaired fine motor, impaired coordination, decreased safety awareness, decreased ROM, impaired cardiopulmonary response to activity.      Rehab Prognosis: Fair; patient would benefit from acute skilled OT services to address these deficits and reach maximum level of function.       Plan:     Patient to be seen 3 x/week to address the above listed problems via self-care/home management, therapeutic activities, therapeutic exercises, neuromuscular re-education, cognitive retraining  Plan of Care Expires: 08/11/23  Plan of Care  Reviewed with: patient    Subjective     Chief Complaint: none  Patient/Family Comments/goals: Pt agreeable to therapy    Occupational Profile: (obtained from PT, who spoke with pt's sister)  Living Environment: Pt was living with her mother prior to her admission, but she has since passed away.  Her house is [facility] with [address] with [address].  She has a walk-in shower with a portable bench and grab bars.  Previous level of function: Independent with ADLs and mobility.  Roles and Routines: sister  Equipment Used at Home: wheelchair, grab bar, walker, rolling, other (see comments) (portable shower bench)  Assistance upon Discharge: Her sister lives in [place]  Possibly caregivers.     Pain/Comfort:  Pain Rating 1: 0/10  Pain Rating Post-Intervention 1: 0/10    Patients cultural, spiritual, Evangelical conflicts given the current situation: no    Objective:     Communicated with: nurse and PT prior to session.  Patient found HOB elevated with arterial line, peripheral IV, baugh catheter, bowel management system, telemetry, blood pressure cuff, pulse ox (continuous), oxygen, NG tube, pressure relief boots upon OT entry to room.    General Precautions: Standard, fall, aspiration, NPO  Orthopedic Precautions: N/A  Braces: N/A  Respiratory Status: High flow, flow 35 L/min, concentration 42%    Occupational Performance:    Bed Mobility:    Patient completed Rolling/Turning to Left and to Right with  total assistance and 2 persons  Patient completed Scooting/Bridging with total assistance and 2 persons  Patient completed Supine to Sit to the L with total assistance and 2 persons  Patient completed Sit to Supine with total assistance and 2 persons    Functional Mobility/Transfers:  Deferred due to pt's fair sitting balance and her impaired cognition.     Activities of Daily Living:  Feeding:  dependence  - NG tube   Grooming: total assistance to wash her face and clean the d/c from her eyes while sitting EOB.  Attempted for pt to perform  [place], but she was unable.  Lower Body Dressing: total assistance to corey/doff non-skid socks while supine  Toileting: dependence - baugh cath and BM management system    Cognitive/Visual Perceptual:  Cognitive/Psychosocial Skills:     -       Oriented to: Name but not to birthday, time, or situation.  Pt initially not oriented to place but later accurately said [facility].   -       Follows Commands/attention:Easily distracted and Follows some one-step commands  -       Communication: Pt minimally verbal    Physical Exam:  Skin integrity: sacral wound.  Green and yellow discharge coming from B eyes  Edema:  R hand swollen  Upper Extremity Range of Motion:     -       Right Upper Extremity: AROM shoulder flexion <90 degrees, AAROM WFL; AROM elbow flex/ext WFL   -       Left Upper Extremity: AROM shoulder flexion <90 degrees, AAROM WFL; AROM elbow flex/ext not assessed due to lines   Upper Extremity Strength:    -       Right Upper Extremity:  2+/5 shoulder flex  -       Left Upper Extremity:  2+/5 shoulder flex   Strength: -       Right Upper Extremity:  weak composite grasp  -       Left Upper Extremity: not assessed due to wrist/hand immobilized for lines  Fine Motor Coordination:    -       Impaired   RUE; unable to assess LUE due to  wrist/hand immobilized for lines    AMPAC 6 Click ADL:  AMPAC Total Score: 6    Treatment & Education:  - Pt sat EOB ~5 minutes with CGA-Min A to correct R lateral lean.  Her HR increased to 120 BPM.    Pt edu on role of OT, POC, safety when performing self care tasks, benefit of performing EOB/OOB activity, and safety when performing functional transfers and mobility.    - Self care tasks completed-- as noted above      Patient left left side-lying with HOB elevated with all lines intact, call button in reach, nurse notified, and nurse present    GOALS:   Multidisciplinary Problems       Occupational Therapy Goals          Problem: Occupational Therapy    Goal Priority Disciplines  Outcome Interventions   Occupational Therapy Goal     OT, PT/OT Ongoing, Progressing    Description: Goals to be met by: 7/28/2023     Patient will increase functional independence with ADLs by performing:    UE Dressing with Minimal Assistance.  LE Dressing with Moderate Assistance.  Grooming while EOB with Minimal Assistance.  Toileting from bedside commode with Maximum Assistance for hygiene and clothing management.   Supine to sit with Moderate Assistance.  Stand pivot transfers with Maximum Assistance with or without AD.  Toilet transfer to bedside commode with Maximum Assistance with or without AD.                         History:     Past Medical History:   Diagnosis Date    Addiction to drug     Alcohol abuse     Alcohol abuse, in remission 6/15/2015    14.5 weeks ago; AA weekly    Anemia     Anxiety 6/15/2015    Behavioral problem     Bipolar disorder     Bipolar disorder in remission 6/15/2015    Cirrhosis, Laennec's 6/15/2015    Depression     Encounter for blood transfusion     Epistaxis 6/15/2015    Fatigue     Glaucoma     Hematuria     Hepatic encephalopathy 6/15/2015    Hepatic enlargement     History of psychiatric care     History of psychiatric hospitalization     History of seizure 6/15/2015    1    hx of intentional Tylenol overdose 6/15/2015    2005- situational and hx of bipolar    Hx of psychiatric care     Macrocytic anemia 9/18/2015    6 units PRBC since June 2015    Jeana     Osteoarthritis     Other ascites 6/15/2015    Psychiatric exam requested by authority     Psychiatric problem     Psychosis 9/26/2019    Renal disorder     Seizures     Self-harming behavior     Suicide attempt     Therapy     Thrombocytopenia 6/15/2015         Past Surgical History:   Procedure Laterality Date    COSMETIC SURGERY      EMBOLIZATION N/A 6/11/2023    Procedure: EMBOLIZATION, BLOOD VESSEL;  Surgeon: Debbie Surgeon;  Location: [facility];  Service: Anesthesiology;  Laterality: N/A;     ESOPHAGOGASTRODUODENOSCOPY      ESOPHAGOGASTRODUODENOSCOPY N/A 7/6/2023    Procedure: EGD (ESOPHAGOGASTRODUODENOSCOPY);  Surgeon: Bernice Guillen MD;  Location: [facility] ([address] [place] [address]);  Service: Endoscopy;  Laterality: N/A;    ESOPHAGOGASTRODUODENOSCOPY N/A 7/11/2023    Procedure: EGD (ESOPHAGOGASTRODUODENOSCOPY);  Surgeon: Rangel Broussard MD;  Location: [facility] ([address] [place] [address]);  Service: Endoscopy;  Laterality: N/A;       Time Tracking:     OT Date of Treatment: 07/14/23  OT Start Time: 1036  OT Stop Time: 1059  OT Total Time (min): 23 min    Billable Minutes:Evaluation 10 min  Self Care/Home Management 13 min    7/14/2023

## 2023-07-14 NOTE — PT/OT/SLP PROGRESS
Speech Language Pathology Treatment    Patient Name:  Marely Hamilton   MRN:  [id number]  Admitting Diagnosis: Gastrointestinal hemorrhage associated with duodenal ulcer    Recommendations:               General Recommendations:  Dysphagia therapy  Diet recommendations:  NPO, NPO   Aspiration Precautions:   Pleasure feeds of ice chips and/or tsp bites of pudding/applesauce for swallow stimulation   Meds crushed in applesauce/pudding  Ice chips sparingly throughout the day for pleasure   General Precautions: Standard, aspiration, fall, NPO  Communication strategies:  provide increased time to answer     Assessment:     Marely Hamilton is a 58 y.o. female with an SLP diagnosis of Dysphagia and Dysphonia.  She presents with slight improvement in tolerance of PO trials though remains a poor candidate for initiation of a PO diet given waxing/waning levels of alertness and decreased laryngeal valve integrity. SLP to continue to follow.     Subjective     SLP spoke with RN prior to session. Pt found resting in bed upon SLP entry into room. Pt agreeable to participate in all aspects of session.      Patient goals: to eat ice     Pain/Comfort:  Pain Rating 1: 0/10    Respiratory Status: High flow, flow 35 L/min, concentration 40%    Objective:     Has the patient been evaluated by SLP for swallowing?   Yes  Keep patient NPO? Yes   Current Respiratory Status:        Pt seen for ongoing dysphagia therapy. Pt remained confused throughout session though with increased relevance during spontaneous speech. She remained with sustained attention deficits though only required minimal redirection to task. HOB elevated for all PO intake. Ice chip trials x3 tolerated without difficulty. Tsp trials of thin liquids resulted in right-sided anterior spillage and decreased closure around utensil alleviated with placement of spoon to left side of mouth (pt maintaining slightly left-leaning posture which may have contributed to degree of  spillage). Single open cup sip of thin liquids resulted in immediate coughing with change in vocal quality. Puree via tsp tolerated x6 without overt signs of aspiration though intermittent multiple swallows. SLP provided education regarding overall impressions, ongoing NPO status, overt s/s of aspiration, and SLP POC. Pt verbalized understanding but would continue to benefit from ongoing education. Whiteboard updated. Spoke with RN regarding impressions and recommendations and nursing verbalized understanding.      Goals:   Multidisciplinary Problems       SLP Goals          Problem: SLP    Goal Priority Disciplines Outcome   SLP Goal     SLP Ongoing, Progressing   Description: Speech Pathology Goals  To be met by 7/27/23    1. Pt will participate in ongoing diagnostic dysphagia therapy                              Plan:     Patient to be seen:  4 x/week   Plan of Care expires:  08/12/23  Plan of Care reviewed with:  patient   SLP Follow-Up:  Yes       Discharge recommendations:   (TBD)   Barriers to Discharge:  Level of Skilled Assistance Needed      Time Tracking:     SLP Treatment Date:   07/14/23  Speech Start Time:  1114  Speech Stop Time:  1126     Speech Total Time (min):  12 min    Billable Minutes: Treatment Swallowing Dysfunction 12      07/14/2023

## 2023-07-15 LAB
ALBUMIN SERPL BCP-MCNC: 2.5 G/DL (ref 3.5–5.2)
ALP SERPL-CCNC: 94 U/L (ref 55–135)
ALT SERPL W/O P-5'-P-CCNC: 10 U/L (ref 10–44)
ANION GAP SERPL CALC-SCNC: 7 MMOL/L (ref 8–16)
ANISOCYTOSIS BLD QL SMEAR: SLIGHT
APTT PPP: 28.2 SEC (ref 21–32)
AST SERPL-CCNC: 26 U/L (ref 10–40)
BASOPHILS # BLD AUTO: 0.01 K/UL (ref 0–0.2)
BASOPHILS NFR BLD: 0.3 % (ref 0–1.9)
BILIRUB SERPL-MCNC: 3.5 MG/DL (ref 0.1–1)
BUN SERPL-MCNC: 8 MG/DL (ref 6–20)
BURR CELLS BLD QL SMEAR: ABNORMAL
CA-I BLDV-SCNC: 1.18 MMOL/L (ref 1.06–1.42)
CA-I BLDV-SCNC: 1.22 MMOL/L (ref 1.06–1.42)
CALCIUM SERPL-MCNC: 7.9 MG/DL (ref 8.7–10.5)
CHLORIDE SERPL-SCNC: 120 MMOL/L (ref 95–110)
CO2 SERPL-SCNC: 21 MMOL/L (ref 23–29)
CREAT SERPL-MCNC: 0.4 MG/DL (ref 0.5–1.4)
DIFFERENTIAL METHOD: ABNORMAL
EOSINOPHIL # BLD AUTO: 0.1 K/UL (ref 0–0.5)
EOSINOPHIL NFR BLD: 2.6 % (ref 0–8)
ERYTHROCYTE [DISTWIDTH] IN BLOOD BY AUTOMATED COUNT: 24.9 % (ref 11.5–14.5)
EST. GFR  (NO RACE VARIABLE): >60 ML/MIN/1.73 M^2
GLUCOSE SERPL-MCNC: 144 MG/DL (ref 70–110)
HCT VFR BLD AUTO: 25.5 % (ref 37–48.5)
HGB BLD-MCNC: 8.1 G/DL (ref 12–16)
IMM GRANULOCYTES # BLD AUTO: 0.01 K/UL (ref 0–0.04)
IMM GRANULOCYTES NFR BLD AUTO: 0.3 % (ref 0–0.5)
INR PPP: 1.8 (ref 0.8–1.2)
LYMPHOCYTES # BLD AUTO: 0.6 K/UL (ref 1–4.8)
LYMPHOCYTES NFR BLD: 16.1 % (ref 18–48)
MAGNESIUM SERPL-MCNC: 1.9 MG/DL (ref 1.6–2.6)
MCH RBC QN AUTO: 32 PG (ref 27–31)
MCHC RBC AUTO-ENTMCNC: 31.8 G/DL (ref 32–36)
MCV RBC AUTO: 101 FL (ref 82–98)
MONOCYTES # BLD AUTO: 0.3 K/UL (ref 0.3–1)
MONOCYTES NFR BLD: 7.1 % (ref 4–15)
NEUTROPHILS # BLD AUTO: 2.8 K/UL (ref 1.8–7.7)
NEUTROPHILS NFR BLD: 73.6 % (ref 38–73)
NRBC BLD-RTO: 0 /100 WBC
PHOSPHATE SERPL-MCNC: 1.6 MG/DL (ref 2.7–4.5)
PLATELET # BLD AUTO: 42 K/UL (ref 150–450)
PLATELET BLD QL SMEAR: ABNORMAL
PMV BLD AUTO: 10.5 FL (ref 9.2–12.9)
POCT GLUCOSE: 137 MG/DL (ref 70–110)
POCT GLUCOSE: 153 MG/DL (ref 70–110)
POCT GLUCOSE: 159 MG/DL (ref 70–110)
POIKILOCYTOSIS BLD QL SMEAR: SLIGHT
POTASSIUM SERPL-SCNC: 3.8 MMOL/L (ref 3.5–5.1)
PROT SERPL-MCNC: 4.6 G/DL (ref 6–8.4)
PROTHROMBIN TIME: 18.5 SEC (ref 9–12.5)
RBC # BLD AUTO: 2.53 M/UL (ref 4–5.4)
SODIUM SERPL-SCNC: 148 MMOL/L (ref 136–145)
WBC # BLD AUTO: 3.79 K/UL (ref 3.9–12.7)

## 2023-07-15 PROCEDURE — 94668 MNPJ CHEST WALL SBSQ: CPT

## 2023-07-15 PROCEDURE — 27200966 HC CLOSED SUCTION SYSTEM

## 2023-07-15 PROCEDURE — 25000242 PHARM REV CODE 250 ALT 637 W/ HCPCS

## 2023-07-15 PROCEDURE — 63600175 PHARM REV CODE 636 W HCPCS: Performed by: STUDENT IN AN ORGANIZED HEALTH CARE EDUCATION/TRAINING PROGRAM

## 2023-07-15 PROCEDURE — 25000003 PHARM REV CODE 250

## 2023-07-15 PROCEDURE — 85025 COMPLETE CBC W/AUTO DIFF WBC: CPT

## 2023-07-15 PROCEDURE — 27100171 HC OXYGEN HIGH FLOW UP TO 24 HOURS

## 2023-07-15 PROCEDURE — 82330 ASSAY OF CALCIUM: CPT | Mod: 91

## 2023-07-15 PROCEDURE — 94660 CPAP INITIATION&MGMT: CPT

## 2023-07-15 PROCEDURE — 85610 PROTHROMBIN TIME: CPT

## 2023-07-15 PROCEDURE — 84100 ASSAY OF PHOSPHORUS: CPT

## 2023-07-15 PROCEDURE — 94761 N-INVAS EAR/PLS OXIMETRY MLT: CPT

## 2023-07-15 PROCEDURE — 99291 PR CRITICAL CARE, E/M 30-74 MINUTES: ICD-10-PCS | Mod: GC,,, | Performed by: INTERNAL MEDICINE

## 2023-07-15 PROCEDURE — 94640 AIRWAY INHALATION TREATMENT: CPT

## 2023-07-15 PROCEDURE — 83735 ASSAY OF MAGNESIUM: CPT

## 2023-07-15 PROCEDURE — 99291 CRITICAL CARE FIRST HOUR: CPT | Mod: GC,,, | Performed by: INTERNAL MEDICINE

## 2023-07-15 PROCEDURE — 25000242 PHARM REV CODE 250 ALT 637 W/ HCPCS: Performed by: INTERNAL MEDICINE

## 2023-07-15 PROCEDURE — C9113 INJ PANTOPRAZOLE SODIUM, VIA: HCPCS | Performed by: STUDENT IN AN ORGANIZED HEALTH CARE EDUCATION/TRAINING PROGRAM

## 2023-07-15 PROCEDURE — 20600001 HC STEP DOWN PRIVATE ROOM

## 2023-07-15 PROCEDURE — 99900026 HC AIRWAY MAINTENANCE (STAT)

## 2023-07-15 PROCEDURE — 99900035 HC TECH TIME PER 15 MIN (STAT)

## 2023-07-15 PROCEDURE — 85730 THROMBOPLASTIN TIME PARTIAL: CPT

## 2023-07-15 PROCEDURE — 80053 COMPREHEN METABOLIC PANEL: CPT

## 2023-07-15 RX ORDER — OLANZAPINE 5 MG/1
5 TABLET ORAL NIGHTLY
Status: DISCONTINUED | OUTPATIENT
Start: 2023-07-15 | End: 2023-07-26

## 2023-07-15 RX ORDER — OLANZAPINE 10 MG/2ML
5 INJECTION, POWDER, FOR SOLUTION INTRAMUSCULAR NIGHTLY
Status: DISCONTINUED | OUTPATIENT
Start: 2023-07-15 | End: 2023-07-15

## 2023-07-15 RX ORDER — SODIUM,POTASSIUM PHOSPHATES 280-250MG
2 POWDER IN PACKET (EA) ORAL EVERY 4 HOURS
Status: COMPLETED | OUTPATIENT
Start: 2023-07-15 | End: 2023-07-15

## 2023-07-15 RX ADMIN — INSULIN ASPART 2 UNITS: 100 INJECTION, SOLUTION INTRAVENOUS; SUBCUTANEOUS at 01:07

## 2023-07-15 RX ADMIN — POTASSIUM & SODIUM PHOSPHATES POWDER PACK 280-160-250 MG 2 PACKET: 280-160-250 PACK at 06:07

## 2023-07-15 RX ADMIN — POTASSIUM & SODIUM PHOSPHATES POWDER PACK 280-160-250 MG 2 PACKET: 280-160-250 PACK at 10:07

## 2023-07-15 RX ADMIN — AMOXICILLIN 1000 MG: 500 CAPSULE ORAL at 08:07

## 2023-07-15 RX ADMIN — IPRATROPIUM BROMIDE AND ALBUTEROL SULFATE 3 ML: .5; 3 SOLUTION RESPIRATORY (INHALATION) at 09:07

## 2023-07-15 RX ADMIN — LEVETIRACETAM 1000 MG: 100 INJECTION, SOLUTION INTRAVENOUS at 08:07

## 2023-07-15 RX ADMIN — ACETYLCYSTEINE 2 ML: 200 SOLUTION ORAL; RESPIRATORY (INHALATION) at 06:07

## 2023-07-15 RX ADMIN — OLANZAPINE 5 MG: 5 TABLET, FILM COATED ORAL at 08:07

## 2023-07-15 RX ADMIN — POTASSIUM & SODIUM PHOSPHATES POWDER PACK 280-160-250 MG 2 PACKET: 280-160-250 PACK at 01:07

## 2023-07-15 RX ADMIN — CLARITHROMYCIN 500 MG: 500 TABLET, FILM COATED ORAL at 08:07

## 2023-07-15 RX ADMIN — ACETYLCYSTEINE 2 ML: 200 SOLUTION ORAL; RESPIRATORY (INHALATION) at 09:07

## 2023-07-15 RX ADMIN — PANTOPRAZOLE SODIUM 40 MG: 40 INJECTION, POWDER, FOR SOLUTION INTRAVENOUS at 08:07

## 2023-07-15 RX ADMIN — IPRATROPIUM BROMIDE AND ALBUTEROL SULFATE 3 ML: .5; 3 SOLUTION RESPIRATORY (INHALATION) at 02:07

## 2023-07-15 RX ADMIN — ACETYLCYSTEINE 2 ML: 200 SOLUTION ORAL; RESPIRATORY (INHALATION) at 02:07

## 2023-07-15 RX ADMIN — HYDROXYZINE HYDROCHLORIDE 50 MG: 25 TABLET, FILM COATED ORAL at 11:07

## 2023-07-15 RX ADMIN — IPRATROPIUM BROMIDE AND ALBUTEROL SULFATE 3 ML: .5; 3 SOLUTION RESPIRATORY (INHALATION) at 06:07

## 2023-07-15 NOTE — PLAN OF CARE
MICU DAILY GOALS       A: Awake    RASS: Goal - RASS Goal: 0-->alert and calm  Actual - RASS (Pepper Agitation-Sedation Scale): alert and calm   Restraint necessity: Clinical Justification: Removing medical devices  B: Breathe   SBT: Not intubated   C: Coordinate A & B, analgesics/sedatives   Pain: managed    SAT: Not intubated  D: Delirium   CAM-ICU: Overall CAM-ICU: Positive  E: Early(intubated/ Progressive (non-intubated) Mobility   MOVE Screen: Pass   Activity: Activity Management: Arm raise - L1  FAS: Feeding/Nutrition   Diet order: Diet/Nutrition Received: NPO, tube feeding,    T: Thrombus   DVT prophylaxis: VTE Required Core Measure: Pharmacological prophylaxis initiated/maintained  H: HOB Elevation   Head of Bed (HOB) Positioning: HOB at 30 degrees  U: Ulcer Prophylaxis   GI: yes  G: Glucose control   managed    S: Skin   Bathing/Skin Care: bath, complete  Device Skin Pressure Protection: absorbent pad utilized/changed, pressure points protected, skin-to-device areas padded, skin-to-skin areas padded, positioning supports utilized  Pressure Reduction Devices: pressure-redistributing mattress utilized  Pressure Reduction Techniques: frequent weight shift encouraged  Skin Protection: skin-to-device areas padded, skin-to-skin areas padded, transparent dressing maintained, tubing/devices free from skin contact    B: Bowel Function   diarrhea   I: Indwelling Catheters   Salda[place] necessity:      Urethral Catheter 07/05/23 1332 Double-lumen-Reason for Continuing Urinary Catheterization: Critically ill in ICU and requiring hourly monitoring of intake/output   CVC necessity: Yes  D: De-escalation Antibiotics   Yes    Family/Goals of care/Code Status   Code Status: DNR    24H Vital Sign Range  Temp:  [99 °F (37.2 °C)-99.3 °F (37.4 °C)]   Pulse:  []   Resp:  [14-19]   BP: (100-127)/(50-59)   SpO2:  [92 %-98 %]   Arterial Line BP: (115-147)/(43-63)      Shift Events   No acute events throughout shift    VS and  assessment per flow sheet, patient progressing towards goals as tolerated, plan of care reviewed with [unfilled] and family, all concerns addressed, will continue to monitor.    Juan Manuel Thornton

## 2023-07-15 NOTE — SUBJECTIVE & OBJECTIVE
Interval History/Significant Events: Wean comfort flow as tolerated. Ensure patient is working with PT/OT/Speech for continuous improvement. Consult psych regarding psych history     Review of Systems   Unable to perform ROS: Patient unresponsive   Objective:     Vital Signs (Most Recent):  Temp: 99.3 °F (37.4 °C) (07/15/23 1100)  Pulse: 88 (07/15/23 1426)  Resp: 18 (07/15/23 1426)  BP: (!) 115/58 (07/15/23 0900)  SpO2: 95 % (07/15/23 1426) Vital Signs (24h Range):  Temp:  [99 °F (37.2 °C)-99.3 °F (37.4 °C)] 99.3 °F (37.4 °C)  Pulse:  [] 88  Resp:  [14-19] 18  SpO2:  [92 %-98 %] 95 %  BP: (100-127)/(50-59) 115/58  Arterial Line BP: (115-147)/(43-63) 115/43   Weight: 59.4 kg (130 lb 15.3 oz)  Body mass index is 23.95 kg/m².      Intake/Output Summary (Last 24 hours) at 7/15/2023 1445  Last data filed at 7/15/2023 1400  Gross per 24 hour   Intake 1008.2 ml   Output 1190 ml   Net -181.8 ml            Physical Exam  Constitutional:       Appearance: She is ill-appearing and toxic-appearing.   HENT:      Head: Normocephalic.   Eyes:      General: Scleral icterus present.   Neck:      Thyroid: No thyroid mass or thyroid tenderness.      Vascular: No carotid bruit or hepatojugular reflux.   Cardiovascular:      Rate and Rhythm: Normal rate.      Pulses: Decreased pulses.      Heart sounds: Heart sounds are distant.   Chest:      Chest wall: No mass, lacerations or deformity.   Breasts:     Right: No swelling.      Left: No swelling.   Abdominal:      General: Abdomen is flat.      Palpations: Abdomen is rigid.      Hernia: No hernia is present.   Genitourinary:     Vagina: Normal.   Musculoskeletal:      Cervical back: Rigidity present.   Lymphadenopathy:      Cervical: No cervical adenopathy.   Skin:     Coloration: Skin is jaundiced.      Findings: Ecchymosis present.   Neurological:      Mental Status: She is unresponsive.          Vents:  Vent Mode: (S) Spont (07/11/23 1154)  Ventilator Initiated: Yes (07/05/23  1221)  Set Rate: 18 BPM (07/11/23 1135)  Vt Set: 300 mL (07/11/23 1135)  Pressure Support: 5 cmH20 (07/11/23 1154)  PEEP/CPAP: 5 cmH20 (07/11/23 1154)  Oxygen Concentration (%): 40 (found pt back on 50%) (07/15/23 1426)  Peak Airway Pressure: 10 cmH20 (07/11/23 1154)  Plateau Pressure: 0 cmH20 (07/11/23 1154)  Total Ve: 6.51 L/m (07/11/23 1154)  Negative Inspiratory Force (cm H2O): 0 (07/11/23 1154)  F/VT Ratio<105 (RSBI): (!) 41.56 (07/11/23 1154)  Lines/Drains/Airways       Drain  Duration                  Urethral Catheter 07/05/23 1332 Double-lumen 10 days         Fecal Incontinence  07/09/23 2300 5 days         NG/OG Tube 07/13/23 1311 [place] sump 18 Fr. Right nostril 2 days              Peripheral Intravenous Line  Duration                  Peripheral IV - Single Lumen 07/11/23 1800 20 G Left Upper Arm 3 days         Peripheral IV - Single Lumen 07/12/23 1148 20 G;1 3/4 in Left Forearm 3 days         Peripheral IV - Single Lumen 07/14/23 1540 20 G;1 3/4 in Right Upper Arm <1 day                  Significant Labs:    CBC/Anemia Profile:  Recent Labs   Lab 07/13/23 2026 07/14/23  0752 07/15/23  0350   WBC 4.92 7.35 3.79*   HGB 8.1* 8.6* 8.1*   HCT 25.8* 27.2* 25.5*   PLT 57* 63* 42*   MCV 99* 98 101*   RDW 24.3* 24.3* 24.9*          Chemistries:  Recent Labs   Lab 07/13/23  1702 07/14/23  0407 07/15/23  0350    145 148*   K 3.8 3.6 3.8   * 118* 120*   CO2 19* 18* 21*   BUN 9 8 8   CREATININE 0.4* 0.4* 0.4*   CALCIUM 8.1* 8.1* 7.9*   ALBUMIN 2.7* 2.7* 2.5*   PROT 4.9* 4.9* 4.6*   BILITOT 3.7* 4.0* 3.5*   ALKPHOS 75 82 94   ALT 11 10 10   AST 28 26 26   MG  --  1.7 1.9   PHOS 1.8* 2.4* 1.6*         All pertinent labs within the past 24 hours have been reviewed.    Significant Imaging:  I have reviewed all pertinent imaging results/findings within the past 24 hours.

## 2023-07-15 NOTE — NURSING
1600 dee midline placed. No complications.  Pt tolerated well.     1645 dark red blood noted in ng tube .  No residuals noted.  Md notfied.         1700 md at bs. Awaiting cxr.      1745 a line removed without difficulty.  Pt tolerated well.

## 2023-07-15 NOTE — PROGRESS NOTES
[facility]  Critical Care Medicine  Progress Note    Patient Name: Marely Hamilton  MRN: [id number]  Admission Date: 7/5/2023  Hospital Length of Stay: 10 days  Code Status: DNR  Attending Provider: Maryann Devi MD  Primary Care Provider: Viktor Ross MD   Principal Problem: Gastrointestinal hemorrhage associated with duodenal ulcer    Subjective:     HPI:  Marely Hamilton is a 58 y.o. female with history of alcoholic cirrhosis, seizure disorder, DVT and IJ thrombus with recent admission for large retroperitoneal hemorrhage requiring IR embolization and IVC filter placement who presents for hematochezia. Onset this morning at [facility], alida blood per rectum per chart, sent to ED. Initially normotensive but then severe hypotension prompted initiation of levophed and intubation. Hgb 6.8, 2u pRBC given + 1u FFP ordered. Hgb 8.5 on 7/2. On levo and vaso currently. K 6.6 but renal function at baseline. Repeat pending. No prior EGDs on record.      Hospital/ICU Course:  Patient was seen at bedside, hematochezia still present post op by IR. Patient has required many transfusions of pRBCs and platelets due to ongoing down trending of Hgb. No clear source of ongoing hemorrhage by imaging. Patient has continued to require pressors to maintain MAP >65. Discussed with GI and IR regarding active bleeding post op, IR indicated there is not much else to do from their end bc they embolized a significant part of GDA. Pt is off of pressor requirement and not actively bleeding. GI re evaluated pt via EGD and found no sources of active bleeding. Pt is extubated and currently on BIPAP requirement due to de saturation in the 80's. Pt is to be seen by speech and PT/OT. Clear mucinous secretions via suction, chest physiotherapy, and cough assist device. Nebs to help with breathing as well. Pt is off BIPAP and currently on comfort flow. Triple therapy (amoxicillin, clarithromycin) started for 14 days to treat for  positive H pylori serology. Pt also had a NG tube placed so we may begin tube feedings. CxR, EKG, and ABG displayed no reason for tachycardia. Pt becomes anxious when seen by different provider teams. Remove art line and consult for midline placement. Continue to wean comfort flow as tolerated. Ensure pt is working with PT/OT/Speech for continuous improvement. Consult psych regarding patient history of bipolar disorder.       Interval History/Significant Events: Wean comfort flow as tolerated. Ensure patient is working with PT/OT/Speech for continuous improvement. Consult psych regarding psych history     Review of Systems   Unable to perform ROS: Patient unresponsive   Objective:     Vital Signs (Most Recent):  Temp: 99.3 °F (37.4 °C) (07/15/23 1100)  Pulse: 88 (07/15/23 1426)  Resp: 18 (07/15/23 1426)  BP: (!) 115/58 (07/15/23 0900)  SpO2: 95 % (07/15/23 1426) Vital Signs (24h Range):  Temp:  [99 °F (37.2 °C)-99.3 °F (37.4 °C)] 99.3 °F (37.4 °C)  Pulse:  [] 88  Resp:  [14-19] 18  SpO2:  [92 %-98 %] 95 %  BP: (100-127)/(50-59) 115/58  Arterial Line BP: (115-147)/(43-63) 115/43   Weight: 59.4 kg (130 lb 15.3 oz)  Body mass index is 23.95 kg/m².      Intake/Output Summary (Last 24 hours) at 7/15/2023 1445  Last data filed at 7/15/2023 1400  Gross per 24 hour   Intake 1008.2 ml   Output 1190 ml   Net -181.8 ml            Physical Exam  Constitutional:       Appearance: She is ill-appearing and toxic-appearing.   HENT:      Head: Normocephalic.   Eyes:      General: Scleral icterus present.   Neck:      Thyroid: No thyroid mass or thyroid tenderness.      Vascular: No carotid bruit or hepatojugular reflux.   Cardiovascular:      Rate and Rhythm: Normal rate.      Pulses: Decreased pulses.      Heart sounds: Heart sounds are distant.   Chest:      Chest wall: No mass, lacerations or deformity.   Breasts:     Right: No swelling.      Left: No swelling.   Abdominal:      General: Abdomen is flat.      Palpations:  Abdomen is rigid.      Hernia: No hernia is present.   Genitourinary:     Vagina: Normal.   Musculoskeletal:      Cervical back: Rigidity present.   Lymphadenopathy:      Cervical: No cervical adenopathy.   Skin:     Coloration: Skin is jaundiced.      Findings: Ecchymosis present.   Neurological:      Mental Status: She is unresponsive.          Vents:  Vent Mode: (S) Spont (07/11/23 1154)  Ventilator Initiated: Yes (07/05/23 1221)  Set Rate: 18 BPM (07/11/23 1135)  Vt Set: 300 mL (07/11/23 1135)  Pressure Support: 5 cmH20 (07/11/23 1154)  PEEP/CPAP: 5 cmH20 (07/11/23 1154)  Oxygen Concentration (%): 40 (found pt back on 50%) (07/15/23 1426)  Peak Airway Pressure: 10 cmH20 (07/11/23 1154)  Plateau Pressure: 0 cmH20 (07/11/23 1154)  Total Ve: 6.51 L/m (07/11/23 1154)  Negative Inspiratory Force (cm H2O): 0 (07/11/23 1154)  F/VT Ratio<105 (RSBI): (!) 41.56 (07/11/23 1154)  Lines/Drains/Airways       Drain  Duration                  Urethral Catheter 07/05/23 1332 Double-lumen 10 days         Fecal Incontinence  07/09/23 2300 5 days         NG/OG Tube 07/13/23 1311 [place] sump 18 Fr. Right nostril 2 days              Peripheral Intravenous Line  Duration                  Peripheral IV - Single Lumen 07/11/23 1800 20 G Left Upper Arm 3 days         Peripheral IV - Single Lumen 07/12/23 1148 20 G;1 3/4 in Left Forearm 3 days         Peripheral IV - Single Lumen 07/14/23 1540 20 G;1 3/4 in Right Upper Arm <1 day                  Significant Labs:    CBC/Anemia Profile:  Recent Labs   Lab 07/13/23  2026 07/14/23  0752 07/15/23  0350   WBC 4.92 7.35 3.79*   HGB 8.1* 8.6* 8.1*   HCT 25.8* 27.2* 25.5*   PLT 57* 63* 42*   MCV 99* 98 101*   RDW 24.3* 24.3* 24.9*          Chemistries:  Recent Labs   Lab 07/13/23  1702 07/14/23  0407 07/15/23  0350    145 148*   K 3.8 3.6 3.8   * 118* 120*   CO2 19* 18* 21*   BUN 9 8 8   CREATININE 0.4* 0.4* 0.4*   CALCIUM 8.1* 8.1* 7.9*   ALBUMIN 2.7* 2.7* 2.5*   PROT 4.9*  4.9* 4.6*   BILITOT 3.7* 4.0* 3.5*   ALKPHOS 75 82 94   ALT 11 10 10   AST 28 26 26   MG  --  1.7 1.9   PHOS 1.8* 2.4* 1.6*         All pertinent labs within the past 24 hours have been reviewed.    Significant Imaging:  I have reviewed all pertinent imaging results/findings within the past 24 hours.      ABG  Recent Labs   Lab 07/13/23  1044   PH 7.429   PO2 150*   PCO2 34.5*   HCO3 22.9*   BE -1     Assessment/Plan:     Psychiatric  Bipolar 1 disorder  - Consult psych regarding Bipolar 1 disorder history    Oncology  Acute blood loss anemia  - Maintain IV access with 2 large bore Ivs  - Intravascular resuscitation/support with IVFs   - Hold all NSAIDs and anticoagulants, unless contraindicated.  - Please correct any coagulopathy with platelets and FFP for goal of platelets >50K and INR <2.0  - Please notify GI team if there is significant change in patient's clinical status  - To date, patient has required at least 21 units of pRBCs to maintain Hgb >7         Endocrine  Severe protein-calorie malnutrition  Nutrition consulted. Most recent weight and BMI monitored-     Measurements:  Wt Readings from Last 1 Encounters:   07/13/23 59.4 kg (130 lb 15.3 oz)   Body mass index is 23.95 kg/m².    Patient has been screened and assessed by RD.    Malnutrition Type:  Context: social/environmental circumstances  Level: severe    Malnutrition Characteristic Summary:  Energy Intake (Malnutrition): less than or equal to 75% for greater than or equal to 1 month  Subcutaneous Fat (Malnutrition): severe depletion  Muscle Mass (Malnutrition): severe depletion    Interventions/Recommendations (treatment strategy):  1.     Plan  -Initiating tube feeds with above formulation  --NG tube placed and tube feedings initiated      GI  * Gastrointestinal hemorrhage associated with duodenal ulcer  See above     H. pylori infection  -- See above     Hematochezia  - GI EGD clipped duodenal ulcers for IR reference  - Transfuse blood products for  active bleeding   - trend CBC and follow  - IR embolized GDA for duodenal hyperemia   -- GI re evaluated site of duodenal ulcers, no noted active bleeding  -- Continue PPI BID for 8 weeks, then once daily after that  --Pt had positive serology for H pylori, begin triple therapy with clarithromycin,amoxicillin, and PPI for 14 days    Other  Retroperitoneal bleed  - CT-A demonstrated stable retroperitoneal hematoma. No need for intervention at this time.       Critical Care Daily Checklist:    A: Awake: RASS Goal/Actual Goal: RASS Goal: 0-->alert and calm  Actual:     B: Spontaneous Breathing Trial Performed? Spon. Breathing Trial Initiated?: Initiated (07/11/23 1529)   C: SAT & SBT Coordinated?  n/a                      D: Delirium: CAM-ICU Overall CAM-ICU: Positive   E: Early Mobility Performed? Yes   F: Feeding Goal: Goals: Will meet % EEN/EPN by next RD f/u.  Status: Nutrition Goal Status: goal not met   Current Diet Order   Procedures    Diet NPO      AS: Analgesia/Sedation n/a   T: Thromboembolic Prophylaxis SCD   H: HOB > 300 Yes   U: Stress Ulcer Prophylaxis (if needed) PPI   G: Glucose Control SSI   B: Bowel Function Stool Occurrence: 1   I: Indwelling Catheter (Lines & Saldaña) Necessity Saldaña   D: De-escalation of Antimicrobials/Pharmacotherapies n/a    Plan for the day/ETD Wean oxygen     Code Status:  Family/Goals of Care: DNR  n/a       Critical secondary to Patient has a condition that poses threat to life and bodily function: Hemorraghic shock        Critical care was time spent personally by me on the following activities: development of treatment plan with patient or surrogate and bedside caregivers, discussions with consultants, evaluation of patient's response to treatment, examination of patient, ordering and performing treatments and interventions, ordering and review of laboratory studies, ordering and review of radiographic studies, pulse oximetry, re-evaluation of patient's condition.  This critical care time did not overlap with that of any other provider or involve time for any procedures.     Phong Jean DO  Critical Care Medicine  [facility]

## 2023-07-15 NOTE — CONSULTS
"CONSULTATION LIAISON PSYCHIATRY INITIAL EVALUATION    Patient Name: Marely Hamilton  MRN: [id number]  Patient Class: IP- Inpatient  Admission Date: 7/5/2023  Attending Physician: Maryann Devi MD      HPI:   Marely Hamilton is a 58 y.o. female with past psychiatric history of bipolar disorder, alcohol use disorder & past pertinent medical history of seizure disorder, alcohol induced hepatic cirrhosis presents to the ED/admitted to the hospital for Gastrointestinal hemorrhage associated with duodenal ulcer. Patient presented from SNF (when she experienced alida blood per rectum per chart review), for which she was recently discharged to when she presented to the hospital on for hepatic encephalopathy c/b retroperitoneal bleed (s/p ICV filter and IR embolization).     Psychiatry consulted for "Bipolar disorder history"    On psych exam, patient is resting comfortably on bed prior to interview. Oriented to person, and place, somewhat oriented to time (gives correct year but states month is June). States that she is in the hospital because she "was bleeding." When asked about psychiatric history states that she has "manic bipolar" and that she is currently in a manic state. Patient unable to substantiate why she believes she currently manic, denying elevated mood, excessive energy, being hyper-verbal, or demonstrating impulsive actions. Reports being prescribed lithium and "one other medication", but endorses issues with medication compliance. Reports outpatient psychiatrist as Dr Coates. Denies current issues with depression or anxiety. Denies SI/HI. When asked about hallucinations patient reports "they manage to surround me, but they can't replicate the dose." Unable to elaborate its meaning. Later in interview denies "seeing or hearing things." Patient overall linear and organized, requires repeated redirection and is occasionally bizarre in her responses.       Medical Review of Systems:  Pertinent items are noted " in HPI.    Psychiatric Review of Systems (is patient experiencing or having changes in):  sleep: no  appetite: no  weight: no  energy/anergy: yes  interest/pleasure/anhedonia: no  somatic symptoms: yes  libido: no  anxiety/panic: no  guilty/hopelessness: no  concentration: yes  Jeana:yes  Psychosis: no  Trauma: no  S.I.B.s/risky behavior: no    Past Psychiatric History:  Previous Medication Trials: yes  Previous Psychiatric Hospitalizations:yes, multiple    Previous Suicide Attempts: yes  History of Violence: no  Outpatient Psychiatrist: yes, Dr Coates  Family Psychiatric History: yes, reports father with possible bipolar disorder    Substance Abuse History (with emphasis over the last 12 months):  Recreational Drugs:  denies  Use of Alcohol:  history of heavy alcohol use with resultant alcohol induced hepatic cirhosis, denies current consumption of alcohol   Tobacco Use: denies  Rehab History:yes    Social History:  Marital Status: not   Children: 0  Employment Status/Info: on disability  :no  Education: post college graduate work or degree, (per chart review patient with [facility] from [facility])  Special Ed: no  Housing Status: with family  Access to gun: no  Psychosocial Stressors: health and drug and alcohol  Functioning Relationships: good support system    Legal History:  Past Charges/Incarcerations: Reports DUI  Pending charges:no    Mental Status Exam:  General Appearance: appears stated age, well-developed, dressed in hospital garb, disheveled  Behavior: cooperative, polite, appropriate eye-contact  Involuntary Movements and Motor Activity: no abnormal involuntary movements noted  Gait and Station: unable to assess - patient lying down or seated  Speech and Language: intact; normal rate, rhythm, volume, tone, and pitch; conversational, spontaneous, and coherent; speaks and understands English proficiently and fluently; repeats words and phrases, no word finding difficulties are  "noted  Mood: "I'm fine"  Affect: appropriate to situation and context, mood-congruent  Thought Process and Associations: linear, goal-directed, abstract (proverbs & similarities), no loosening of associations  Thought Content and Perceptions:: no suicidal or homicidal ideation, no auditory or visual hallucinations, no paranoid ideation, no ideas of reference, no evidence of delusions or psychosis  Sensorium and Orientation: grossly intact, oriented to person and place, somwhat oriented to time  Recent and Remote Memory: intact  Attention and Concentration: easily distractible, able to spell WORLD forwards and backwards  Fund of Knowledge: grossly intact, used appropriate vocabulary and demonstrated an awareness of current events, consistent with educational level achieved  Insight: limited/partial awareness of illness  Judgment: adequate, reports compliance with health provider's recommendations and instructions    CAM ICU positive? no      ASSESSMENT & RECOMMENDATIONS   Hx of Bipolar Disorder    Hx of Bipolar Disorder  PSYCH MEDICATIONS  Scheduled: Recommend Zyprexa 5 mg QHS   Does not appear to be requiring PRNs at this time, in future if patient does become agitated recommend Zyprexa 5 mg PO/IM.   In future, once patient stabilized, recommend restarting home lithium. Possibly as outpatient.   Monitor QTc with daily EKGs      RISK ASSESSMENT  NO NEED FOR PEC patient NOT in any imminent danger of hurting self or others and not gravely disabled.     FOLLOW UP  Will follow up while in house    DISPOSITION - once medically cleared:   Defer to medical team    Please contact ON CALL psychiatry service (24/7) for any acute issues that may arise.    Dr. Jose Fraga  CL Psychiatry  [facility]  7/15/2023 2:39 " PM        --------------------------------------------------------------------------------------------------------------------------------------------------------------------------------------------------------------------------------------    CONTINUED HISTORY & OBJECTIVE clinical data & findings reviewed and noted for above decision making    Past Medical/Surgical History:   Past Medical History:   Diagnosis Date    Addiction to drug     Alcohol abuse     Alcohol abuse, in remission 6/15/2015    14.5 weeks ago; AA weekly    Anemia     Anxiety 6/15/2015    Behavioral problem     Bipolar disorder     Bipolar disorder in remission 6/15/2015    Cirrhosis, Laennec's 6/15/2015    Depression     Encounter for blood transfusion     Epistaxis 6/15/2015    Fatigue     Glaucoma     Hematuria     Hepatic encephalopathy 6/15/2015    Hepatic enlargement     History of psychiatric care     History of psychiatric hospitalization     History of seizure 6/15/2015    1    hx of intentional Tylenol overdose 6/15/2015    2005- situational and hx of bipolar    Hx of psychiatric care     Macrocytic anemia 9/18/2015    6 units PRBC since June 2015    Jeana     Osteoarthritis     Other ascites 6/15/2015    Psychiatric exam requested by authority     Psychiatric problem     Psychosis 9/26/2019    Renal disorder     Seizures     Self-harming behavior     Suicide attempt     Therapy     Thrombocytopenia 6/15/2015     Past Surgical History:   Procedure Laterality Date    COSMETIC SURGERY      EMBOLIZATION N/A 6/11/2023    Procedure: EMBOLIZATION, BLOOD VESSEL;  Surgeon: Debbie Surgeon;  Location: [facility];  Service: Anesthesiology;  Laterality: N/A;    ESOPHAGOGASTRODUODENOSCOPY      ESOPHAGOGASTRODUODENOSCOPY N/A 7/6/2023    Procedure: EGD (ESOPHAGOGASTRODUODENOSCOPY);  Surgeon: Bernice Guillen MD;  Location: [facility] ([address] [place] [address]);  Service: Endoscopy;  Laterality: N/A;    ESOPHAGOGASTRODUODENOSCOPY N/A 7/11/2023    Procedure: EGD  (ESOPHAGOGASTRODUODENOSCOPY);  Surgeon: Rangel Broussard MD;  Location: [facility] ([address] [place] [address]);  Service: Endoscopy;  Laterality: N/A;       Current Medications:   Scheduled Meds:    acetylcysteine 200 mg/ml (20%)  2 mL Nebulization TID    albuterol-ipratropium  3 mL Nebulization TID    amoxicillin  1,000 mg Per NG tube Q12H    clarithromycin  500 mg Per NG tube Q12H    levETIRAcetam (Keppra) IV (PEDS and ADULTS)  1,000 mg Intravenous Q12H    pantoprazole  40 mg Intravenous BID     PRN Meds: dextrose 10%, dextrose 10%, dextrose 5 % and 0.45 % NaCl, glucagon (human recombinant), hydrOXYzine HCL, insulin aspart U-100, sodium chloride 0.9%    Allergies:   Review of patient's allergies indicates:   Allergen Reactions    Sulfa (sulfonamide antibiotics) Rash    Codeine Nausea And Vomiting       Vitals  Vitals:    07/15/23 1426   BP:    Pulse: 88   Resp: 18   Temp:        Labs/Imaging/Studies:  Recent Results (from the past 24 hour(s))   POCT glucose    Collection Time: 07/14/23  5:09 PM   Result Value Ref Range    POCT Glucose 88 70 - 110 mg/dL   Calcium, ionized    Collection Time: 07/14/23  5:25 PM   Result Value Ref Range    Ionized Calcium 1.20 1.06 - 1.42 mmol/L   APTT    Collection Time: 07/15/23  3:50 AM   Result Value Ref Range    aPTT 28.2 21.0 - 32.0 sec   Magnesium    Collection Time: 07/15/23  3:50 AM   Result Value Ref Range    Magnesium 1.9 1.6 - 2.6 mg/dL   Calcium, ionized    Collection Time: 07/15/23  3:50 AM   Result Value Ref Range    Ionized Calcium 1.22 1.06 - 1.42 mmol/L   CBC Auto Differential    Collection Time: 07/15/23  3:50 AM   Result Value Ref Range    WBC 3.79 (L) 3.90 - 12.70 K/uL    RBC 2.53 (L) 4.00 - 5.40 M/uL    Hemoglobin 8.1 (L) 12.0 - 16.0 g/dL    Hematocrit 25.5 (L) 37.0 - 48.5 %     (H) 82 - 98 fL    MCH 32.0 (H) 27.0 - 31.0 pg    MCHC 31.8 (L) 32.0 - 36.0 g/dL    RDW 24.9 (H) 11.5 - 14.5 %    Platelets 42 (L) 150 - 450 K/uL    MPV 10.5 9.2 - 12.9 fL    Immature Granulocytes 0.3  0.0 - 0.5 %    Gran # (ANC) 2.8 1.8 - 7.7 K/uL    Immature Grans (Abs) 0.01 0.00 - 0.04 K/uL    Lymph # 0.6 (L) 1.0 - 4.8 K/uL    Mono # 0.3 0.3 - 1.0 K/uL    Eos # 0.1 0.0 - 0.5 K/uL    Baso # 0.01 0.00 - 0.20 K/uL    nRBC 0 0 /100 WBC    Gran % 73.6 (H) 38.0 - 73.0 %    Lymph % 16.1 (L) 18.0 - 48.0 %    Mono % 7.1 4.0 - 15.0 %    Eosinophil % 2.6 0.0 - 8.0 %    Basophil % 0.3 0.0 - 1.9 %    Platelet Estimate Decreased (A)     Aniso Slight     Poik Slight     Con[place] Cells Occasional     Differential Method Automated    Comprehensive metabolic panel    Collection Time: 07/15/23  3:50 AM   Result Value Ref Range    Sodium 148 (H) 136 - 145 mmol/L    Potassium 3.8 3.5 - 5.1 mmol/L    Chloride 120 (H) 95 - 110 mmol/L    CO2 21 (L) 23 - 29 mmol/L    Glucose 144 (H) 70 - 110 mg/dL    BUN 8 6 - 20 mg/dL    Creatinine 0.4 (L) 0.5 - 1.4 mg/dL    Calcium 7.9 (L) 8.7 - 10.5 mg/dL    Total Protein 4.6 (L) 6.0 - 8.4 g/dL    Albumin 2.5 (L) 3.5 - 5.2 g/dL    Total Bilirubin 3.5 (H) 0.1 - 1.0 mg/dL    Alkaline Phosphatase 94 55 - 135 U/L    AST 26 10 - 40 U/L    ALT 10 10 - 44 U/L    eGFR >60.0 >60 mL/min/1.73 m^2    Anion Gap 7 (L) 8 - 16 mmol/L   Protime-INR    Collection Time: 07/15/23  3:50 AM   Result Value Ref Range    Prothrombin Time 18.5 (H) 9.0 - 12.5 sec    INR 1.8 (H) 0.8 - 1.2   Phosphorus    Collection Time: 07/15/23  3:50 AM   Result Value Ref Range    Phosphorus 1.6 (L) 2.7 - 4.5 mg/dL   POCT glucose    Collection Time: 07/15/23  1:05 PM   Result Value Ref Range    POCT Glucose 153 (H) 70 - 110 mg/dL     Imaging Results              CTA Acute GI Seth Ward, Abdomen and Pelvis (Final result)  Result time 07/05/23 17:15:19      Final result by Mumtaz Garsia MD (07/05/23 17:15:19)                   Impression:      Images are degraded by significant streak and motion artifacts.    Stable large left retroperitoneal hematoma and left iliacus hematoma.  No contrast extravasation within the hematomas or bowel to indicate  active arterial bleed.    Hepatic cirrhosis.  Diffuse wall thickening of the stomach and colon, which can be seen in the setting of hepatic dysfunction.  However, superimposed inflammatory processes are not excluded.    Multiple, nondilated, distended fluid-filled loops of small bowel without a definite transition point.  Stool and air seen within the colon, this is suggestive of ileus.    Small left pleural effusion with associated compressive atelectasis.    Cholelithiasis.    Cardiomegaly.    Electronically signed by resident: Emiliano Mcmahon  Date:    07/05/2023  Time:    16:29    Electronically signed by: Mumtaz Garsia MD  Date:    07/05/2023  Time:    17:15               Narrative:    EXAMINATION:  CTA ACUTE GI BLEED, ABDOMEN AND PELVIS    CLINICAL HISTORY:  GI bleed;    TECHNIQUE:  Initial noncontrast  images were obtained of the abdomen and pelvis.  CT axial angiography images were then obtained from the lung bases to the pubic symphysis following the intravenous administration of 100 of Omnipaque 350 with delayed images obtained per GI bleeding protocol.  Sagittal and coronal reformats were provided.    COMPARISON:  CTA GI bleed 06/13/2023    FINDINGS:  Images are degraded by significant streak and motion artifacts.    Lungs: Interval decrease in size of the small right pleural effusion with associated compressive atelectasis.  Resolution of the left pleural effusion.    Heart: Enlarged.  No pericardial effusion.    Liver: Nodular contour of the liver.  No focal abnormality.    Gallbladder: Multiple calcified gallstones.  No pericholecystic inflammatory changes.    Bile ducts: No intrahepatic or extrahepatic biliary ductal dilatation.    Spleen: Normal size.    Pancreas: No mass. No ductal dilatation. No peripancreatic fat stranding.    Adrenals: No significant abnormality    Renal/Ureters: Bilateral cortical thinning.  No hydronephrosis.  Proximal ureters are normal caliber.  The distal ureters are  difficult to evaluate due to streak artifact.  Bladder is decompressed with Saldaña catheter in place.    Reproductive: Uterus appears without significant abnormality.  No adnexal mass, noting limited evaluation due to significant streak artifact.    Stomach/Bowel: Stomach is distended with ingested contents with thickened rugal folds.  Small bowel is distended with multiple nondilated fluid-filled loops.  No transition point.  Appendix is normal.  Diffuse wall thickening throughout the colon with mild stool burden.  Diffuse wall thickening of the rectum.  No contrast extravasation to indicate active arterial bleed.    Peritoneum: Stable large left retroperitoneal hematoma measuring 11.5 x 7.8 x 12.6 cm.  No contrast extravasation to indicate active bleed within the hematoma.  Additional smaller hematoma anterior to the left iliacus muscle, which appears stable compared to prior CTA.  No free fluid. No intraperitoneal free air.    Lymph Nodes: Multiple prominent mesenteric and retroperitoneal lymph nodes.    Vasculature: Abdominal aorta tapers normally.  Mild atherosclerosis of the abdominal aorta and its branches.    Bones: Bony mineralization is diminished.  Left hip arthroplasty.  Generalized body wall edema.    Soft Tissues: No significant abnormality.                                       CT Head Without Contrast (Final result)  Result time 07/05/23 15:10:53      Final result by Viktor Desouza MD (07/05/23 15:10:53)                   Impression:      No evidence of acute intracranial pathology.    Volume loss again identified.    Electronically signed by resident: Kenney Rosas  Date:    07/05/2023  Time:    14:45    Electronically signed by: Viktor Desouza  Date:    07/05/2023  Time:    15:10               Narrative:    EXAMINATION:  CT HEAD WITHOUT CONTRAST    CLINICAL HISTORY:  Mental status change, unknown cause;    TECHNIQUE:  Low dose axial CT images obtained throughout the head without the use of  intravenous contrast.  Axial, sagittal and coronal reconstructions were performed.    COMPARISON:  CT head 06/22/2023    FINDINGS:  Intracranial compartment:    Volume loss without evidence of hydrocephalus.    No parenchymal  hemorrhage, edema, mass effect or major vascular distribution infarct.    Decreased attenuation in the periventricular white matter is nonspecific but may reflect mild chronic small vessel ischemic change vs other encephalopathy.    No extra-axial blood or fluid collections.    Skull/extracranial contents (limited evaluation):    No displaced calvarial fracture.    The visualized sinuses and mastoid air cells are clear.                                       X-Ray Chest AP Portable (Final result)  Result time 07/05/23 13:09:11      Final result by Americo Reyes MD (07/05/23 13:09:11)                   Impression:      No significant detrimental interval change in the appearance of the chest since 06/25/2023 is appreciated, with significant improvement as noted above.  No post procedure pneumothorax.      Electronically signed by: Americo Reyes MD  Date:    07/05/2023  Time:    13:09               Narrative:    EXAMINATION:  XR CHEST AP PORTABLE    TECHNIQUE:  One view was obtained.  Note is made of the fact that the thorax is obscured to some extent by opacities external to the patient, particularly defibrillator pads.    COMPARISON:  Comparison is made to the most recent prior chest radiograph of 06/25/2023.    FINDINGS:  Endotracheal tube has been placed, its tip lying just superior to the apex of the aortic arch, well above the katharina.  Left jugular origin vascular catheter is now seen, its tip in the superior vena cava near the junction of the right and left brachiocephalic veins.  Allowing for magnification of the cardiomediastinal silhouette related to projection, the heart is not significantly enlarged.  Opacity in both inferior hemithoraces seen on the previous examination has resolved,  consistent with clearing of airspace consolidation in both mid/lower lung zones and resolution of previously present bilateral pleural fluid.  Lung zones are currently clear and free of significant airspace consolidation or volume loss.  No pleural fluid of any substantial volume is seen on either side.  No pneumothorax.

## 2023-07-16 PROBLEM — E83.39 HYPOPHOSPHATEMIA: Status: RESOLVED | Noted: 2023-05-26 | Resolved: 2023-07-16

## 2023-07-16 PROBLEM — R09.02 HYPOXIA: Status: ACTIVE | Noted: 2023-07-16

## 2023-07-16 LAB
ALBUMIN SERPL BCP-MCNC: 2.3 G/DL (ref 3.5–5.2)
ALP SERPL-CCNC: 124 U/L (ref 55–135)
ALT SERPL W/O P-5'-P-CCNC: 11 U/L (ref 10–44)
AMMONIA PLAS-SCNC: 48 UMOL/L (ref 10–50)
ANION GAP SERPL CALC-SCNC: 5 MMOL/L (ref 8–16)
APTT PPP: 33.4 SEC (ref 21–32)
ASCENDING AORTA: 2.88 CM
AST SERPL-CCNC: 24 U/L (ref 10–40)
AV INDEX (PROSTH): 0.73
AV MEAN GRADIENT: 12 MMHG
AV PEAK GRADIENT: 21 MMHG
AV VALVE AREA: 2.45 CM2
AV VELOCITY RATIO: 0.69
BASOPHILS # BLD AUTO: 0.02 K/UL (ref 0–0.2)
BASOPHILS NFR BLD: 0.5 % (ref 0–1.9)
BILIRUB SERPL-MCNC: 3 MG/DL (ref 0.1–1)
BNP SERPL-MCNC: 174 PG/ML (ref 0–99)
BSA FOR ECHO PROCEDURE: 1.65 M2
BUN SERPL-MCNC: 7 MG/DL (ref 6–20)
CA-I BLDV-SCNC: 1.22 MMOL/L (ref 1.06–1.42)
CA-I BLDV-SCNC: 1.24 MMOL/L (ref 1.06–1.42)
CALCIUM SERPL-MCNC: 7.8 MG/DL (ref 8.7–10.5)
CHLORIDE SERPL-SCNC: 118 MMOL/L (ref 95–110)
CO2 SERPL-SCNC: 22 MMOL/L (ref 23–29)
CREAT SERPL-MCNC: 0.4 MG/DL (ref 0.5–1.4)
CV ECHO LV RWT: 0.31 CM
DIFFERENTIAL METHOD: ABNORMAL
DOP CALC AO PEAK VEL: 2.28 M/S
DOP CALC AO VTI: 46.69 CM
DOP CALC LVOT AREA: 3.4 CM2
DOP CALC LVOT DIAMETER: 2.07 CM
DOP CALC LVOT PEAK VEL: 1.58 M/S
DOP CALC LVOT STROKE VOLUME: 114.57 CM3
DOP CALCLVOT PEAK VEL VTI: 34.06 CM
E WAVE DECELERATION TIME: 178.64 MSEC
E/A RATIO: 0.94
E/E' RATIO: 8.07 M/S
ECHO LV POSTERIOR WALL: 0.82 CM (ref 0.6–1.1)
EJECTION FRACTION: 65 %
EOSINOPHIL # BLD AUTO: 0.1 K/UL (ref 0–0.5)
EOSINOPHIL NFR BLD: 3.2 % (ref 0–8)
ERYTHROCYTE [DISTWIDTH] IN BLOOD BY AUTOMATED COUNT: 24.7 % (ref 11.5–14.5)
EST. GFR  (NO RACE VARIABLE): >60 ML/MIN/1.73 M^2
FRACTIONAL SHORTENING: 34 % (ref 28–44)
GLUCOSE SERPL-MCNC: 128 MG/DL (ref 70–110)
HCT VFR BLD AUTO: 26 % (ref 37–48.5)
HGB BLD-MCNC: 8 G/DL (ref 12–16)
IMM GRANULOCYTES # BLD AUTO: 0.03 K/UL (ref 0–0.04)
IMM GRANULOCYTES NFR BLD AUTO: 0.8 % (ref 0–0.5)
INTERVENTRICULAR SEPTUM: 0.63 CM (ref 0.6–1.1)
IVRT: 89.44 MSEC
LA MAJOR: 5.78 CM
LA MINOR: 6.13 CM
LA WIDTH: 4.1 CM
LACTATE SERPL-SCNC: 1.4 MMOL/L (ref 0.5–2.2)
LACTATE SERPL-SCNC: 1.4 MMOL/L (ref 0.5–2.2)
LEFT ATRIUM SIZE: 3.24 CM
LEFT ATRIUM VOLUME INDEX MOD: 47.2 ML/M2
LEFT ATRIUM VOLUME INDEX: 41.2 ML/M2
LEFT ATRIUM VOLUME MOD: 77 CM3
LEFT ATRIUM VOLUME: 67.18 CM3
LEFT INTERNAL DIMENSION IN SYSTOLE: 3.54 CM (ref 2.1–4)
LEFT VENTRICLE DIASTOLIC VOLUME INDEX: 85.75 ML/M2
LEFT VENTRICLE DIASTOLIC VOLUME: 139.78 ML
LEFT VENTRICLE MASS INDEX: 83 G/M2
LEFT VENTRICLE SYSTOLIC VOLUME INDEX: 32.1 ML/M2
LEFT VENTRICLE SYSTOLIC VOLUME: 52.39 ML
LEFT VENTRICULAR INTERNAL DIMENSION IN DIASTOLE: 5.37 CM (ref 3.5–6)
LEFT VENTRICULAR MASS: 135.68 G
LV LATERAL E/E' RATIO: 7.8 M/S
LV SEPTAL E/E' RATIO: 8.36 M/S
LYMPHOCYTES # BLD AUTO: 0.5 K/UL (ref 1–4.8)
LYMPHOCYTES NFR BLD: 14.1 % (ref 18–48)
MAGNESIUM SERPL-MCNC: 1.6 MG/DL (ref 1.6–2.6)
MCH RBC QN AUTO: 31.4 PG (ref 27–31)
MCHC RBC AUTO-ENTMCNC: 30.8 G/DL (ref 32–36)
MCV RBC AUTO: 102 FL (ref 82–98)
MONOCYTES # BLD AUTO: 0.2 K/UL (ref 0.3–1)
MONOCYTES NFR BLD: 6.1 % (ref 4–15)
MV PEAK A VEL: 1.25 M/S
MV PEAK E VEL: 1.17 M/S
MV STENOSIS PRESSURE HALF TIME: 51.81 MS
MV VALVE AREA P 1/2 METHOD: 4.25 CM2
NEUTROPHILS # BLD AUTO: 2.8 K/UL (ref 1.8–7.7)
NEUTROPHILS NFR BLD: 75.3 % (ref 38–73)
NRBC BLD-RTO: 0 /100 WBC
PHOSPHATE SERPL-MCNC: 2.3 MG/DL (ref 2.7–4.5)
PHOSPHATE SERPL-MCNC: 2.4 MG/DL (ref 2.7–4.5)
PISA TR MAX VEL: 2.74 M/S
PLATELET # BLD AUTO: 41 K/UL (ref 150–450)
PMV BLD AUTO: 9.8 FL (ref 9.2–12.9)
POCT GLUCOSE: 141 MG/DL (ref 70–110)
POCT GLUCOSE: 149 MG/DL (ref 70–110)
POTASSIUM SERPL-SCNC: 4 MMOL/L (ref 3.5–5.1)
PROT SERPL-MCNC: 4.4 G/DL (ref 6–8.4)
RA MAJOR: 5.46 CM
RA PRESSURE: 8 MMHG
RA WIDTH: 3.77 CM
RBC # BLD AUTO: 2.55 M/UL (ref 4–5.4)
RIGHT VENTRICULAR END-DIASTOLIC DIMENSION: 3.48 CM
SINUS: 2.93 CM
SODIUM SERPL-SCNC: 145 MMOL/L (ref 136–145)
STJ: 2.57 CM
TDI LATERAL: 0.15 M/S
TDI SEPTAL: 0.14 M/S
TDI: 0.15 M/S
TR MAX PG: 30 MMHG
TRICUSPID ANNULAR PLANE SYSTOLIC EXCURSION: 3.61 CM
TSH SERPL DL<=0.005 MIU/L-ACNC: 1.4 UIU/ML (ref 0.4–4)
TV REST PULMONARY ARTERY PRESSURE: 38 MMHG
WBC # BLD AUTO: 3.76 K/UL (ref 3.9–12.7)

## 2023-07-16 PROCEDURE — 99232 PR SUBSEQUENT HOSPITAL CARE,LEVL II: ICD-10-PCS | Mod: 95,,, | Performed by: STUDENT IN AN ORGANIZED HEALTH CARE EDUCATION/TRAINING PROGRAM

## 2023-07-16 PROCEDURE — 25000003 PHARM REV CODE 250

## 2023-07-16 PROCEDURE — 99232 SBSQ HOSP IP/OBS MODERATE 35: CPT | Mod: 95,,, | Performed by: STUDENT IN AN ORGANIZED HEALTH CARE EDUCATION/TRAINING PROGRAM

## 2023-07-16 PROCEDURE — 63600175 PHARM REV CODE 636 W HCPCS: Performed by: STUDENT IN AN ORGANIZED HEALTH CARE EDUCATION/TRAINING PROGRAM

## 2023-07-16 PROCEDURE — 51798 US URINE CAPACITY MEASURE: CPT

## 2023-07-16 PROCEDURE — 80053 COMPREHEN METABOLIC PANEL: CPT

## 2023-07-16 PROCEDURE — 82140 ASSAY OF AMMONIA: CPT | Performed by: HOSPITALIST

## 2023-07-16 PROCEDURE — 99900035 HC TECH TIME PER 15 MIN (STAT)

## 2023-07-16 PROCEDURE — 83735 ASSAY OF MAGNESIUM: CPT

## 2023-07-16 PROCEDURE — 94761 N-INVAS EAR/PLS OXIMETRY MLT: CPT

## 2023-07-16 PROCEDURE — 82330 ASSAY OF CALCIUM: CPT | Mod: 91

## 2023-07-16 PROCEDURE — 83880 ASSAY OF NATRIURETIC PEPTIDE: CPT

## 2023-07-16 PROCEDURE — 94668 MNPJ CHEST WALL SBSQ: CPT

## 2023-07-16 PROCEDURE — 25000242 PHARM REV CODE 250 ALT 637 W/ HCPCS

## 2023-07-16 PROCEDURE — 99291 CRITICAL CARE FIRST HOUR: CPT | Mod: GC,,, | Performed by: INTERNAL MEDICINE

## 2023-07-16 PROCEDURE — 85730 THROMBOPLASTIN TIME PARTIAL: CPT

## 2023-07-16 PROCEDURE — 99291 PR CRITICAL CARE, E/M 30-74 MINUTES: ICD-10-PCS | Mod: GC,,, | Performed by: INTERNAL MEDICINE

## 2023-07-16 PROCEDURE — 99233 SBSQ HOSP IP/OBS HIGH 50: CPT | Mod: ,,, | Performed by: HOSPITALIST

## 2023-07-16 PROCEDURE — 84443 ASSAY THYROID STIM HORMONE: CPT

## 2023-07-16 PROCEDURE — 25000242 PHARM REV CODE 250 ALT 637 W/ HCPCS: Performed by: INTERNAL MEDICINE

## 2023-07-16 PROCEDURE — 84100 ASSAY OF PHOSPHORUS: CPT | Mod: 91

## 2023-07-16 PROCEDURE — 83605 ASSAY OF LACTIC ACID: CPT | Mod: 91 | Performed by: HOSPITALIST

## 2023-07-16 PROCEDURE — 20600001 HC STEP DOWN PRIVATE ROOM

## 2023-07-16 PROCEDURE — 84100 ASSAY OF PHOSPHORUS: CPT

## 2023-07-16 PROCEDURE — 85025 COMPLETE CBC W/AUTO DIFF WBC: CPT

## 2023-07-16 PROCEDURE — 99233 PR SUBSEQUENT HOSPITAL CARE,LEVL III: ICD-10-PCS | Mod: ,,, | Performed by: HOSPITALIST

## 2023-07-16 PROCEDURE — C9113 INJ PANTOPRAZOLE SODIUM, VIA: HCPCS | Performed by: STUDENT IN AN ORGANIZED HEALTH CARE EDUCATION/TRAINING PROGRAM

## 2023-07-16 PROCEDURE — 36415 COLL VENOUS BLD VENIPUNCTURE: CPT

## 2023-07-16 PROCEDURE — 94640 AIRWAY INHALATION TREATMENT: CPT

## 2023-07-16 PROCEDURE — 27000221 HC OXYGEN, UP TO 24 HOURS

## 2023-07-16 RX ORDER — LANOLIN ALCOHOL/MO/W.PET/CERES
400 CREAM (GRAM) TOPICAL ONCE
Status: DISCONTINUED | OUTPATIENT
Start: 2023-07-16 | End: 2023-07-16

## 2023-07-16 RX ORDER — SODIUM,POTASSIUM PHOSPHATES 280-250MG
2 POWDER IN PACKET (EA) ORAL ONCE
Status: COMPLETED | OUTPATIENT
Start: 2023-07-16 | End: 2023-07-16

## 2023-07-16 RX ORDER — MAGNESIUM SULFATE HEPTAHYDRATE 40 MG/ML
2 INJECTION, SOLUTION INTRAVENOUS ONCE
Status: DISCONTINUED | OUTPATIENT
Start: 2023-07-16 | End: 2023-07-16

## 2023-07-16 RX ORDER — LANOLIN ALCOHOL/MO/W.PET/CERES
400 CREAM (GRAM) TOPICAL ONCE
Status: COMPLETED | OUTPATIENT
Start: 2023-07-16 | End: 2023-07-16

## 2023-07-16 RX ORDER — LEVETIRACETAM 100 MG/ML
1000 SOLUTION ORAL 2 TIMES DAILY
Status: DISCONTINUED | OUTPATIENT
Start: 2023-07-16 | End: 2023-07-27

## 2023-07-16 RX ADMIN — IPRATROPIUM BROMIDE AND ALBUTEROL SULFATE 3 ML: .5; 3 SOLUTION RESPIRATORY (INHALATION) at 10:07

## 2023-07-16 RX ADMIN — LEVETIRACETAM 1000 MG: 100 INJECTION, SOLUTION INTRAVENOUS at 08:07

## 2023-07-16 RX ADMIN — CLARITHROMYCIN 500 MG: 500 TABLET, FILM COATED ORAL at 09:07

## 2023-07-16 RX ADMIN — OLANZAPINE 5 MG: 5 TABLET, FILM COATED ORAL at 09:07

## 2023-07-16 RX ADMIN — CLARITHROMYCIN 500 MG: 500 TABLET, FILM COATED ORAL at 08:07

## 2023-07-16 RX ADMIN — Medication 400 MG: at 06:07

## 2023-07-16 RX ADMIN — ACETYLCYSTEINE 2 ML: 200 SOLUTION ORAL; RESPIRATORY (INHALATION) at 10:07

## 2023-07-16 RX ADMIN — AMOXICILLIN 1000 MG: 500 CAPSULE ORAL at 08:07

## 2023-07-16 RX ADMIN — LEVETIRACETAM 1000 MG: 500 SOLUTION ORAL at 09:07

## 2023-07-16 RX ADMIN — IPRATROPIUM BROMIDE AND ALBUTEROL SULFATE 3 ML: .5; 3 SOLUTION RESPIRATORY (INHALATION) at 05:07

## 2023-07-16 RX ADMIN — PANTOPRAZOLE SODIUM 40 MG: 40 INJECTION, POWDER, FOR SOLUTION INTRAVENOUS at 09:07

## 2023-07-16 RX ADMIN — ACETYLCYSTEINE 2 ML: 200 SOLUTION ORAL; RESPIRATORY (INHALATION) at 05:07

## 2023-07-16 RX ADMIN — AMOXICILLIN 1000 MG: 500 CAPSULE ORAL at 09:07

## 2023-07-16 RX ADMIN — PANTOPRAZOLE SODIUM 40 MG: 40 INJECTION, POWDER, FOR SOLUTION INTRAVENOUS at 08:07

## 2023-07-16 RX ADMIN — POTASSIUM & SODIUM PHOSPHATES POWDER PACK 280-160-250 MG 2 PACKET: 280-160-250 PACK at 12:07

## 2023-07-16 NOTE — PROGRESS NOTES
[facility] - Telemetry Stepdown  Critical Care Medicine  Progress Note    Patient Name: Marely Hamilton  MRN: [id number]  Admission Date: 7/5/2023  Hospital Length of Stay: 11 days  Code Status: DNR  Attending Provider: Seth Noyola MD  Primary Care Provider: Viktor Ross MD   Principal Problem: Gastrointestinal hemorrhage associated with duodenal ulcer    Subjective:     HPI:  aMrely Hamilton is a 58 y.o. female with history of alcoholic cirrhosis, seizure disorder, DVT and IJ thrombus with recent admission for large retroperitoneal hemorrhage requiring IR embolization and IVC filter placement who presents for hematochezia. Onset this morning at SNF, alida blood per rectum per chart, sent to ED. Initially normotensive but then severe hypotension prompted initiation of levophed and intubation. Hgb 6.8, 2u pRBC given + 1u FFP ordered. Hgb 8.5 on 7/2. On levo and vaso currently. K 6.6 but renal function at baseline. Repeat pending. No prior EGDs on record.      Hospital/ICU Course:  Patient was seen at bedside, hematochezia still present post op by IR. Patient has required many transfusions of pRBCs and platelets due to ongoing down trending of Hgb. No clear source of ongoing hemorrhage by imaging. Patient has continued to require pressors to maintain MAP >65. Discussed with GI and IR regarding active bleeding post op, IR indicated there is not much else to do from their end bc they embolized a significant part of GDA. Pt is off of pressor requirement and not actively bleeding. GI re evaluated pt via EGD and found no sources of active bleeding. Pt is extubated and currently on BIPAP requirement due to de saturation in the 80's. Pt is to be seen by speech and PT/OT. Clear mucinous secretions via suction, chest physiotherapy, and cough assist device. Nebs to help with breathing as well. Pt is off BIPAP and currently on comfort flow. Triple therapy (amoxicillin, clarithromycin) started for 14 days to treat for  positive H pylori serology. Pt also had a NG tube placed so we may begin tube feedings. CxR, EKG, and ABG displayed no reason for tachycardia. Pt becomes anxious when seen by different provider teams. Remove art line and consult for midline placement. Continue to wean comfort flow as tolerated. Ensure pt is working with PT/OT/Speech for continuous improvement. Consult psych regarding patient history of bipolar disorder. Pt was seen by psych and they recommended zyprexa 5mg QHS and restarting lithium therapy once able to tolerate oral meds. Patient to continue work with PT/OT/Speech. Weaned to NC 4 L, sats are appropriate. Continue to endorse coughing of secretions and building strength so NG tube can be removed.       Interval History/Significant Events: Pt stable for stepdown. Currently on 4L of oxygen NC.    Review of Systems   Unable to perform ROS: Patient unresponsive   Objective:     Vital Signs (Most Recent):  Temp: 99.5 °F (37.5 °C) (07/16/23 0700)  Pulse: 95 (07/16/23 0926)  Resp: 18 (07/16/23 0926)  BP: (!) 117/56 (07/16/23 0900)  SpO2: 98 % (07/16/23 0926) Vital Signs (24h Range):  Temp:  [98.1 °F (36.7 °C)-99.5 °F (37.5 °C)] 99.5 °F (37.5 °C)  Pulse:  [] 95  Resp:  [13-24] 18  SpO2:  [91 %-100 %] 98 %  BP: (107-144)/(54-68) 117/56   Weight: 62.7 kg (138 lb 4.4 oz)  Body mass index is 25.29 kg/m².      Intake/Output Summary (Last 24 hours) at 7/16/2023 0987  Last data filed at 7/16/2023 0738  Gross per 24 hour   Intake 2060 ml   Output 1715 ml   Net 345 ml            Physical Exam  Constitutional:       Appearance: She is ill-appearing and toxic-appearing.   HENT:      Head: Normocephalic.   Eyes:      General: Scleral icterus present.   Neck:      Thyroid: No thyroid mass or thyroid tenderness.      Vascular: No carotid bruit or hepatojugular reflux.   Cardiovascular:      Rate and Rhythm: Normal rate.      Pulses: Decreased pulses.      Heart sounds: Heart sounds are distant.   Chest:      Chest  wall: No mass, lacerations or deformity.   Breasts:     Right: No swelling.      Left: No swelling.   Abdominal:      General: Abdomen is flat.      Palpations: Abdomen is rigid.      Hernia: No hernia is present.   Genitourinary:     Vagina: Normal.   Musculoskeletal:      Cervical back: Rigidity present.   Lymphadenopathy:      Cervical: No cervical adenopathy.   Skin:     Coloration: Skin is jaundiced.      Findings: Ecchymosis present.   Neurological:      Mental Status: She is unresponsive.          Vents:  Vent Mode: (S) Spont (07/11/23 1154)  Ventilator Initiated: Yes (07/05/23 1221)  Set Rate: 18 BPM (07/11/23 1135)  Vt Set: 300 mL (07/11/23 1135)  Pressure Support: 5 cmH20 (07/11/23 1154)  PEEP/CPAP: 5 cmH20 (07/11/23 1154)  Oxygen Concentration (%): 40 (07/15/23 1700)  Peak Airway Pressure: 10 cmH20 (07/11/23 1154)  Plateau Pressure: 0 cmH20 (07/11/23 1154)  Total Ve: 6.51 L/m (07/11/23 1154)  Negative Inspiratory Force (cm H2O): 0 (07/11/23 1154)  F/VT Ratio<105 (RSBI): (!) 41.56 (07/11/23 1154)  Lines/Drains/Airways       Drain  Duration                  Urethral Catheter 07/05/23 1332 Double-lumen 10 days         Fecal Incontinence  07/09/23 2300 6 days         NG/OG Tube 07/13/23 1311 [place] sump 18 Fr. Right nostril 2 days              Peripheral Intravenous Line  Duration                  Peripheral IV - Single Lumen 07/12/23 1148 20 G;1 3/4 in Left Forearm 3 days         Peripheral IV - Single Lumen 07/14/23 1540 20 G;1 3/4 in Right Upper Arm 1 day                  Significant Labs:    CBC/Anemia Profile:  Recent Labs   Lab 07/15/23  0350 07/16/23  0825   WBC 3.79* 3.76*   HGB 8.1* 8.0*   HCT 25.5* 26.0*   PLT 42* 41*   * 102*   RDW 24.9* 24.7*          Chemistries:  Recent Labs   Lab 07/15/23  0350 07/16/23  0451   * 145   K 3.8 4.0   * 118*   CO2 21* 22*   BUN 8 7   CREATININE 0.4* 0.4*   CALCIUM 7.9* 7.8*   ALBUMIN 2.5* 2.3*   PROT 4.6* 4.4*   BILITOT 3.5* 3.0*    ALKPHOS 94 124   ALT 10 11   AST 26 24   MG 1.9 1.6   PHOS 1.6*  --          All pertinent labs within the past 24 hours have been reviewed.    Significant Imaging:  I have reviewed all pertinent imaging results/findings within the past 24 hours.      ABG  Recent Labs   Lab 07/13/23  1044   PH 7.429   PO2 150*   PCO2 34.5*   HCO3 22.9*   BE -1     Assessment/Plan:     Neuro  History of seizure  Continue Keppra BID    Psychiatric  Bipolar 1 disorder  - Consult psych regarding Bipolar 1 disorder history  -- Start home medication lithium once patient stabilizes and tolerates PO    Hematology  Thrombocytopenia  See Above    Oncology  Acute blood loss anemia  - Maintain IV access with 2 large bore Ivs  - Intravascular resuscitation/support with IVFs   - Hold all NSAIDs and anticoagulants, unless contraindicated.  - Please correct any coagulopathy with platelets and FFP for goal of platelets >50K and INR <2.0  - Please notify GI team if there is significant change in patient's clinical status  - To date, patient has required at least 21 units of pRBCs to maintain Hgb >7         Endocrine  Severe protein-calorie malnutrition  Nutrition consulted. Most recent weight and BMI monitored-     Measurements:  Wt Readings from Last 1 Encounters:   07/16/23 62.7 kg (138 lb 4.4 oz)   Body mass index is 25.29 kg/m².    Patient has been screened and assessed by RD.    Malnutrition Type:  Context: social/environmental circumstances  Level: severe    Malnutrition Characteristic Summary:  Energy Intake (Malnutrition): less than or equal to 75% for greater than or equal to 1 month  Subcutaneous Fat (Malnutrition): severe depletion  Muscle Mass (Malnutrition): severe depletion    Interventions/Recommendations (treatment strategy):  1.     Plan  -Initiating tube feeds with above formulation  --NG tube placed and tube feedings initiated      GI  * Gastrointestinal hemorrhage associated with duodenal ulcer  See above     H. pylori  infection  -- See above     Hematochezia  - GI EGD clipped duodenal ulcers for IR reference  - Transfuse blood products for active bleeding   - trend CBC and follow  - IR embolized GDA for duodenal hyperemia   -- GI re evaluated site of duodenal ulcers, no noted active bleeding  -- Continue PPI BID for 8 weeks, then once daily after that  --Pt had positive serology for H pylori, begin triple therapy with clarithromycin,amoxicillin, and PPI for 14 days    -- Patient is stable from ICU standpoint. Continue NC,chest physiotherapy, and speech evaluations. Patient to be assessed to have NG tube removed once develops stronger cough and removes secretions. Continue PT/OT to continue to build strength. Once patient is tolerating oral medications begin lithium home medication regime. Discuss with CM regarding placement once patient is stable for discharge, pt was brought in from SNF.     Other  Retroperitoneal bleed  - CT-A demonstrated stable retroperitoneal hematoma. No need for intervention at this time.       Critical Care Daily Checklist:    A: Awake: RASS Goal/Actual Goal: RASS Goal: 0-->alert and calm  Actual:     B: Spontaneous Breathing Trial Performed? Spon. Breathing Trial Initiated?: Initiated (07/11/23 1529)   C: SAT & SBT Coordinated?  n/a                      D: Delirium: CAM-ICU Overall CAM-ICU: Positive   E: Early Mobility Performed? Yes   F: Feeding Goal: Goals: Will meet % EEN/EPN by next RD f/u.  Status: Nutrition Goal Status: goal not met   Current Diet Order   Procedures    Diet NPO      AS: Analgesia/Sedation n/a   T: Thromboembolic Prophylaxis SCD   H: HOB > 300 Yes   U: Stress Ulcer Prophylaxis (if needed) PPI   G: Glucose Control SSI   B: Bowel Function Stool Occurrence: 1   I: Indwelling Catheter (Lines & Saldaña) Necessity Saldaña   D: De-escalation of Antimicrobials/Pharmacotherapies n/a    Plan for the day/ETD Stepdown    Code Status:  Family/Goals of Care: DNR  n/a       Critical secondary  to Patient has a condition that poses threat to life and bodily function: Hemorraghic shock     Critical care was time spent personally by me on the following activities: development of treatment plan with patient or surrogate and bedside caregivers, discussions with consultants, evaluation of patient's response to treatment, examination of patient, ordering and performing treatments and interventions, ordering and review of laboratory studies, ordering and review of radiographic studies, pulse oximetry, re-evaluation of patient's condition. This critical care time did not overlap with that of any other provider or involve time for any procedures.     Phong Jean, DO  Critical Care Medicine  [facility] - Telemetry Stepdown

## 2023-07-16 NOTE — PROGRESS NOTES
"CONSULTATION LIAISON PSYCHIATRY PROGRESS NOTE    Patient Name: Marely Hamilton  MRN: [id number]  Patient Class: IP- Inpatient  Admission Date: 7/5/2023  Attending Physician: Maryann Devi MD      SUBJECTIVE:   Marely Hamilton is a 58 y.o. female with past psychiatric history of bipolar disorder, alcohol use disorder & past pertinent medical history of seizure disorder, alcohol induced hepatic cirrhosis presents to the ED/admitted to the hospital for Gastrointestinal hemorrhage associated with duodenal ulcer. Patient presented from SNF (when she experienced alida blood per rectum per chart review), for which she was recently discharged to when she presented to the hospital on for hepatic encephalopathy c/b retroperitoneal bleed (s/p ICV filter and IR embolization).      Psychiatry consulted for "Bipolar disorder history"    Today, Marely reports that "I haven't slept well and I couldn't sleep." When asked what the particular reason is, she does not elaborate and stares at this notewriter. When asked about symptoms of crystal, she does not exhibit agitation, a flight of ideas, pressured speech, or distractibility. She denies depressed mood, SI, anhedonia, and feelings of guilt. At some point in interview, pt is asked where her current location is and she mentions "call [facility] since they have different facilities around." When asked what the date is, she gives this exact response. Pt is unable to be redirected.         OBJECTIVE:    Mental Status Exam:  General Appearance: dressed in hospital garb, lying in bed, appears older than stated age  Behavior: pleasant, polite, minimal responses  Involuntary Movements and Motor Activity: no abnormal involuntary movements noted; no tics, no tremors, no akathisia, no dystonia, no evidence of tardive dyskinesia; no psychomotor agitation or retardation  Gait and Station: unable to assess - patient lying down or seated  Speech and Language: increased latency of response, " "soft  Mood: "Not good"  Affect: dysphoric  Thought Process and Associations: disorganized, circumstantial  Thought Content and Perceptions:: no suicidal ideation, no homicidal ideation, no hallucinations  Sensorium and Orientation: drowsy  Recent and Remote Memory: Unable to  Formally Assess due to her somnolence  Attention and Concentration: Unable to Formally Assess due to her somnolence  Fund of Knowledge: Unable to Formally Assess due to her somnolence  Insight: poor  Judgment: limited    CAM ICU positive? no      ASSESSMENT & RECOMMENDATIONS   Hx of Bipolar Disorder     Hx of Bipolar Disorder  PSYCH MEDICATIONS  Scheduled: Recommend Zyprexa 5 mg QHS   Does not appear to be requiring PRNs at this time, in future if patient does become agitated recommend Zyprexa 5 mg PO/IM.   In future, once patient stabilized, recommend restarting home lithium, likely in outpatient setting.  Monitor QTc with daily EKGs        RISK ASSESSMENT  NO NEED FOR PEC patient NOT in any imminent danger of hurting self or others and not gravely disabled.      FOLLOW UP  Will follow up while in house     DISPOSITION - once medically cleared:   Defer to medical team    Please contact ON CALL psychiatry service (24/7) for any acute issues that may arise.    Dr. Deujan Muniz   Psychiatry PGY-II  [facility]  7/16/2023 8:00 AM        --------------------------------------------------------------------------------------------------------------------------------------------------------------------------------------------------------------------------------------    CONTINUED OBJECTIVE clinical data & findings reviewed and noted for above decision making    Current Medications:   Scheduled Meds:    acetylcysteine 200 mg/ml (20%)  2 mL Nebulization TID    albuterol-ipratropium  3 mL Nebulization TID    amoxicillin  1,000 mg Per NG tube Q12H    clarithromycin  500 mg Per NG tube Q12H    levETIRAcetam (Keppra) IV (PEDS " and ADULTS)  1,000 mg Intravenous Q12H    OLANZapine  5 mg Per NG tube QHS    pantoprazole  40 mg Intravenous BID     PRN Meds: dextrose 10%, dextrose 10%, dextrose 5 % and 0.45 % NaCl, glucagon (human recombinant), hydrOXYzine HCL, insulin aspart U-100, sodium chloride 0.9%    Allergies:   Review of patient's allergies indicates:   Allergen Reactions    Sulfa (sulfonamide antibiotics) Rash    Codeine Nausea And Vomiting       Vitals  Vitals:    07/16/23 0706   BP:    Pulse: 102   Resp:    Temp:        Labs/Imaging/Studies:  Recent Results (from the past 24 hour(s))   POCT glucose    Collection Time: 07/15/23  1:05 PM   Result Value Ref Range    POCT Glucose 153 (H) 70 - 110 mg/dL   Calcium, ionized    Collection Time: 07/15/23  4:11 PM   Result Value Ref Range    Ionized Calcium 1.18 1.06 - 1.42 mmol/L   POCT glucose    Collection Time: 07/15/23  6:10 PM   Result Value Ref Range    POCT Glucose 137 (H) 70 - 110 mg/dL   POCT glucose    Collection Time: 07/15/23 11:28 PM   Result Value Ref Range    POCT Glucose 159 (H) 70 - 110 mg/dL   APTT    Collection Time: 07/16/23  4:51 AM   Result Value Ref Range    aPTT 33.4 (H) 21.0 - 32.0 sec   Magnesium    Collection Time: 07/16/23  4:51 AM   Result Value Ref Range    Magnesium 1.6 1.6 - 2.6 mg/dL   Calcium, ionized    Collection Time: 07/16/23  4:51 AM   Result Value Ref Range    Ionized Calcium 1.24 1.06 - 1.42 mmol/L   Comprehensive Metabolic Panel    Collection Time: 07/16/23  4:51 AM   Result Value Ref Range    Sodium 145 136 - 145 mmol/L    Potassium 4.0 3.5 - 5.1 mmol/L    Chloride 118 (H) 95 - 110 mmol/L    CO2 22 (L) 23 - 29 mmol/L    Glucose 128 (H) 70 - 110 mg/dL    BUN 7 6 - 20 mg/dL    Creatinine 0.4 (L) 0.5 - 1.4 mg/dL    Calcium 7.8 (L) 8.7 - 10.5 mg/dL    Total Protein 4.4 (L) 6.0 - 8.4 g/dL    Albumin 2.3 (L) 3.5 - 5.2 g/dL    Total Bilirubin 3.0 (H) 0.1 - 1.0 mg/dL    Alkaline Phosphatase 124 55 - 135 U/L    AST 24 10 - 40 U/L    ALT 11 10 - 44 U/L    eGFR  >60.0 >60 mL/min/1.73 m^2    Anion Gap 5 (L) 8 - 16 mmol/L   POCT glucose    Collection Time: 07/16/23  4:51 AM   Result Value Ref Range    POCT Glucose 149 (H) 70 - 110 mg/dL     Imaging Results              CTA Acute GI [place], Abdomen and Pelvis (Final result)  Result time 07/05/23 17:15:19      Final result by Mumtaz Garsia MD (07/05/23 17:15:19)                   Impression:      Images are degraded by significant streak and motion artifacts.    Stable large left retroperitoneal hematoma and left iliacus hematoma.  No contrast extravasation within the hematomas or bowel to indicate active arterial bleed.    Hepatic cirrhosis.  Diffuse wall thickening of the stomach and colon, which can be seen in the setting of hepatic dysfunction.  However, superimposed inflammatory processes are not excluded.    Multiple, nondilated, distended fluid-filled loops of small bowel without a definite transition point.  Stool and air seen within the colon, this is suggestive of ileus.    Small left pleural effusion with associated compressive atelectasis.    Cholelithiasis.    Cardiomegaly.    Electronically signed by resident: Emiliano Mcmahon  Date:    07/05/2023  Time:    16:29    Electronically signed by: Mumtaz Garsia MD  Date:    07/05/2023  Time:    17:15               Narrative:    EXAMINATION:  CTA ACUTE GI BLEED, ABDOMEN AND PELVIS    CLINICAL HISTORY:  GI bleed;    TECHNIQUE:  Initial noncontrast  images were obtained of the abdomen and pelvis.  CT axial angiography images were then obtained from the lung bases to the pubic symphysis following the intravenous administration of 100 of Omnipaque 350 with delayed images obtained per GI bleeding protocol.  Sagittal and coronal reformats were provided.    COMPARISON:  CTA GI bleed 06/13/2023    FINDINGS:  Images are degraded by significant streak and motion artifacts.    Lungs: Interval decrease in size of the small right pleural effusion with associated compressive  atelectasis.  Resolution of the left pleural effusion.    Heart: Enlarged.  No pericardial effusion.    Liver: Nodular contour of the liver.  No focal abnormality.    Gallbladder: Multiple calcified gallstones.  No pericholecystic inflammatory changes.    Bile ducts: No intrahepatic or extrahepatic biliary ductal dilatation.    Spleen: Normal size.    Pancreas: No mass. No ductal dilatation. No peripancreatic fat stranding.    Adrenals: No significant abnormality    Renal/Ureters: Bilateral cortical thinning.  No hydronephrosis.  Proximal ureters are normal caliber.  The distal ureters are difficult to evaluate due to streak artifact.  Bladder is decompressed with Saldaña catheter in place.    Reproductive: Uterus appears without significant abnormality.  No adnexal mass, noting limited evaluation due to significant streak artifact.    Stomach/Bowel: Stomach is distended with ingested contents with thickened rugal folds.  Small bowel is distended with multiple nondilated fluid-filled loops.  No transition point.  Appendix is normal.  Diffuse wall thickening throughout the colon with mild stool burden.  Diffuse wall thickening of the rectum.  No contrast extravasation to indicate active arterial bleed.    Peritoneum: Stable large left retroperitoneal hematoma measuring 11.5 x 7.8 x 12.6 cm.  No contrast extravasation to indicate active bleed within the hematoma.  Additional smaller hematoma anterior to the left iliacus muscle, which appears stable compared to prior CTA.  No free fluid. No intraperitoneal free air.    Lymph Nodes: Multiple prominent mesenteric and retroperitoneal lymph nodes.    Vasculature: Abdominal aorta tapers normally.  Mild atherosclerosis of the abdominal aorta and its branches.    Bones: Bony mineralization is diminished.  Left hip arthroplasty.  Generalized body wall edema.    Soft Tissues: No significant abnormality.                                       CT Head Without Contrast (Final result)   Result time 07/05/23 15:10:53      Final result by Viktor Desouza MD (07/05/23 15:10:53)                   Impression:      No evidence of acute intracranial pathology.    Volume loss again identified.    Electronically signed by resident: Kenney Rosas  Date:    07/05/2023  Time:    14:45    Electronically signed by: Viktor Desouza  Date:    07/05/2023  Time:    15:10               Narrative:    EXAMINATION:  CT HEAD WITHOUT CONTRAST    CLINICAL HISTORY:  Mental status change, unknown cause;    TECHNIQUE:  Low dose axial CT images obtained throughout the head without the use of intravenous contrast.  Axial, sagittal and coronal reconstructions were performed.    COMPARISON:  CT head 06/22/2023    FINDINGS:  Intracranial compartment:    Volume loss without evidence of hydrocephalus.    No parenchymal  hemorrhage, edema, mass effect or major vascular distribution infarct.    Decreased attenuation in the periventricular white matter is nonspecific but may reflect mild chronic small vessel ischemic change vs other encephalopathy.    No extra-axial blood or fluid collections.    Skull/extracranial contents (limited evaluation):    No displaced calvarial fracture.    The visualized sinuses and mastoid air cells are clear.                                       X-Ray Chest AP Portable (Final result)  Result time 07/05/23 13:09:11      Final result by Americo Reyes MD (07/05/23 13:09:11)                   Impression:      No significant detrimental interval change in the appearance of the chest since 06/25/2023 is appreciated, with significant improvement as noted above.  No post procedure pneumothorax.      Electronically signed by: Americo Reyes MD  Date:    07/05/2023  Time:    13:09               Narrative:    EXAMINATION:  XR CHEST AP PORTABLE    TECHNIQUE:  One view was obtained.  Note is made of the fact that the thorax is obscured to some extent by opacities external to the patient, particularly defibrillator  pads.    COMPARISON:  Comparison is made to the most recent prior chest radiograph of 06/25/2023.    FINDINGS:  Endotracheal tube has been placed, its tip lying just superior to the apex of the aortic arch, well above the katharina.  Left jugular origin vascular catheter is now seen, its tip in the superior vena cava near the junction of the right and left brachiocephalic veins.  Allowing for magnification of the cardiomediastinal silhouette related to projection, the heart is not significantly enlarged.  Opacity in both inferior hemithoraces seen on the previous examination has resolved, consistent with clearing of airspace consolidation in both mid/lower lung zones and resolution of previously present bilateral pleural fluid.  Lung zones are currently clear and free of significant airspace consolidation or volume loss.  No pleural fluid of any substantial volume is seen on either side.  No pneumothorax.

## 2023-07-16 NOTE — ASSESSMENT & PLAN NOTE
with cirrhosis   Patient with thrombocytopenia   Recent Labs   Lab 07/14/23  0752 07/15/23  0350 07/16/23  0825   PLT 63* 42* 41*   . Platelet counts stable  .monitor

## 2023-07-16 NOTE — HOSPITAL COURSE
Patient was seen at bedside, hematochezia still present post op by IR. Patient has required many transfusions of pRBCs and platelets due to ongoing down trending of Hgb. No clear source of ongoing hemorrhage by imaging. Patient has continued to require pressors to maintain MAP >65. Discussed with GI and IR regarding active bleeding post op, IR indicated there is not much else to do from their end bc they embolized a significant part of GDA. Pt is off of pressor requirement and not actively bleeding. GI re evaluated pt via EGD and found no sources of active bleeding. Pt is extubated and currently on BIPAP requirement due to de saturation in the 80's. Pt is to be seen by speech and PT/OT. Clear mucinous secretions via suction, chest physiotherapy, and cough assist device. Nebs to help with breathing as well. Pt is off BIPAP and currently on comfort flow. Triple therapy (amoxicillin, clarithromycin) started for 14 days to treat for positive H pylori serology. Pt also had a NG tube placed so we may begin tube feedings. CxR, EKG, and ABG displayed no reason for tachycardia. Pt becomes anxious when seen by different provider teams. Remove art line and consult for midline placement. Continue to wean comfort flow as tolerated. Ensure pt is working with PT/OT/Speech for continuous improvement. Consult psych regarding patient history of bipolar disorder.         Interval History/Significant Events: Wean comfort flow as tolerated. Ensure patient is working with PT/OT/Speech for continuous improvement. Consult psych regarding psych history     7/16   history of alcoholic cirrhosis, seizure disorder, DVT and IJ thrombus with recent admission for large retroperitoneal hemorrhage requiring IR embolization and IVC filter placement who presents for hematochezia. s/p GI and IR in which they clipped (GI) and embolized (IR) possible sources of duodenal ulcer bleeding.  Hb stable  s/p extubation. sats 92% on 5L of NC.  CX ray -  Detrimental changes since the previous examination including interval increase in pulmonary vascularity and bilateral diffuse interstitial pulmonary parenchymal opacification, progression of abnormal opacification at the left lung base, and development of small right-sided pleural effusion.   findings would be consistent with congestive heart failure.PT/OT recs SNF. BNP , procalcitonin and Echo.  SLP recs NPO .  On NGT feeds. psychiatry following for bipolar disorders.  Recs Zyprexa 5 mg QHS . Ammonia 48 WNL. AAOX 1. Hepatology consulted for cirrhosis. UA ordered   7/17 Saldaña removed. UA +. UC pending. started on IV ceftriaxone. ammonia at 59. on B/L UE mittens as pulled of NGT. Hepatology eval. resumed lactulose . discussed with sister and updated clinical status   7/18  MBS today - started puree nectar thick liquids.  on oxygen 3L via NC.   wean  as tolerated . UC -YEAST 10,000 - 49,999 cfu/ml Identification pending No other significant isolate. hydroxyzine  PRN discontinued  due to it not being safe in delirium. oriented to person, hospital and year   7/19 SLP recs - tolerating Puree Diet - IDDSI Level 4, Mildly thick/Nectar thick liquids - IDDSI Level 2 .stool output 600ml/ monitor on lactulose . improved mentation AAOX 3  7/26 Await NH placement. Little improvement in functional status. Tunneled fistula seen near rectum. Low grade temp.  7/26 K replaced. Await NH placement. Little improvement in functional status. Tunneled fistula seen near rectum. Low grade temp. Consulted psych for recs concerning discharge.   7/27 Sister wanted her started back on lithium but psychiatry recommended  increasing Zyprexa 7.5mg QHS. EKG to monitor QtcDiscontinue zyprexa if Qtc >480. EKG QTC 493ms.  Zyprexa resumed at  zyprexa 5 nightly per psychiatry . Urinary retention this AM s/p straight cath   7/28 continues with confusion. UA + . UC pending. continue ceftriaxone. no fistula found on scaral exam with wound care.  Repeat EKG  today to monitor Qtc. Saldaña catheter placed for urinary retention .  started  lithium 300 mg  nightly due to concern for manic episode  7/29 SLP recs - Mechanical soft, Thin . UC - GRAM NEGATIVE BILLY 10,000 - 49,999 cfu/ml  Identification and susceptibility pending  .ammonia elevated at 70. lactulose increased to 30g TID .started rifaximin BID   7/30 BMX 1 documented . pending mental status improvement   7/31 UC with klebsialla penumoniae and Aerogenes resisant to ceftriaxone. started on ciprofloxacin . sodium bicarbonate for bicarb of 16   8/1 K replaced. ammonia trending down . QTc  458ms . improved mentation today. oriented to date and month   8/2 Improving mentation . 3 BMs yesterday     Will dc to NH. Overall prognosis discussed with sister.

## 2023-07-16 NOTE — ASSESSMENT & PLAN NOTE
alcoholic cirrhosis  MELD 3.0: 20 at 7/16/2023  4:51 AM  MELD-Na: 17 at 7/16/2023  4:51 AM  Calculated from:  Serum Creatinine: 0.4 mg/dL (Using min of 1 mg/dL) at 7/16/2023  4:51 AM  Serum Sodium: 145 mmol/L (Using max of 137 mmol/L) at 7/16/2023  4:51 AM  Total Bilirubin: 3.0 mg/dL at 7/16/2023  4:51 AM  Serum Albumin: 2.3 g/dL at 7/16/2023  4:51 AM  INR(ratio): 1.8 at 7/15/2023  3:50 AM  Age at listing (hypothetical): 58 years  Sex: Female at 7/16/2023  4:51 AM     Recent Labs   Lab 07/16/23  1201   AMMONIA 48     Strict input /output monitor, daily weights

## 2023-07-16 NOTE — ASSESSMENT & PLAN NOTE
ammonia 190s on admit -->90s . AAOX 1. no lactulose give due to GI bleed  repeat ammonia. Hepatolog eval  with     MELD 3.0: 20 at 7/16/2023  4:51 AM  MELD-Na: 17 at 7/16/2023  4:51 AM  Calculated from:  Serum Creatinine: 0.4 mg/dL (Using min of 1 mg/dL) at 7/16/2023  4:51 AM  Serum Sodium: 145 mmol/L (Using max of 137 mmol/L) at 7/16/2023  4:51 AM  Total Bilirubin: 3.0 mg/dL at 7/16/2023  4:51 AM  Serum Albumin: 2.3 g/dL at 7/16/2023  4:51 AM  INR(ratio): 1.8 at 7/15/2023  3:50 AM  Age at listing (hypothetical): 58 years  Sex: Female at 7/16/2023  4:51 AM  7/16 Ammonia 48 WNL. AAOX 1. Hepatology consulted for cirrhosis. UA ordered

## 2023-07-16 NOTE — PLAN OF CARE
MICU DAILY GOALS       A: Awake    RASS: Goal - RASS Goal: 0-->alert and calm  Actual - RASS (Pepper Agitation-Sedation Scale): restless   Restraint necessity: Clinical Justification: Removing medical devices  B: Breathe   SBT: Not intubated   C: Coordinate A & B, analgesics/sedatives   Pain: managed    SAT: Not intubated  D: Delirium   CAM-ICU: Overall CAM-ICU: Negative  E: Early(intubated/ Progressive (non-intubated) Mobility   MOVE Screen: Pass   Activity: Activity Management: Arm raise - L1  FAS: Feeding/Nutrition   Diet order: Diet/Nutrition Received: NPO, tube feeding,    T: Thrombus   DVT prophylaxis: VTE Required Core Measure: Per order contraindicated for SCDs/Anticoagulants  H: HOB Elevation   Head of Bed (HOB) Positioning: HOB at 30-45 degrees (Simultaneous filing. User may not have seen previous data.)  U: Ulcer Prophylaxis   GI: yes  G: Glucose control   managed    S: Skin   Bathing/Skin Care: bath, complete, dressed/undressed, electrode patches/site rotation, foot care, incontinence care, linen changed, shampoo  Device Skin Pressure Protection: absorbent pad utilized/changed, adhesive use limited  Pressure Reduction Devices: foam padding utilized, specialty bed utilized  Pressure Reduction Techniques: frequent weight shift encouraged  Skin Protection: adhesive use limited, incontinence pads utilized    B: Bowel Function   diarrhea   I: Indwelling Catheters   Saldaña necessity:      Urethral Catheter 07/05/23 1332 Double-lumen-Reason for Continuing Urinary Catheterization: Critically ill in ICU and requiring hourly monitoring of intake/output   CVC necessity: Yes  D: De-escalation Antibiotics   Yes    Family/Goals of care/Code Status   Code Status: DNR    24H Vital Sign Range  Temp:  [98.1 °F (36.7 °C)-99.3 °F (37.4 °C)]   Pulse:  [79-96]   Resp:  [13-24]   BP: (107-135)/(54-68)   SpO2:  [91 %-100 %]      Shift Events   No acute events throughout shift    VS and assessment per flow sheet, patient  progressing towards goals as tolerated, plan of care reviewed with  Ms. Hamilton , all concerns addressed, will continue to monitor.    Rere Watson

## 2023-07-16 NOTE — ASSESSMENT & PLAN NOTE
- Consult psych regarding Bipolar 1 disorder history  -- Start home medication lithium once patient stabilizes and tolerates PO

## 2023-07-16 NOTE — ASSESSMENT & PLAN NOTE
positive serology for H pylori, begin triple therapy with clarithromycin,amoxicillin, and PPI for 14 days

## 2023-07-16 NOTE — ASSESSMENT & PLAN NOTE
7/16 sats 92% on 5L of NC.  CX ray - Detrimental changes since the previous examination including interval increase in pulmonary vascularity and bilateral diffuse interstitial pulmonary parenchymal opacification, progression of abnormal opacification at the left lung base, and development of small right-sided pleural effusion.   findings would be consistent with congestive heart failure.PT/OT recs SNF. BNP , procalcitonin and Echo.

## 2023-07-16 NOTE — CLINICAL REVIEW
IP Sepsis Screen (most recent)       Sepsis Screen (IP) - 07/16/23 1016       Is the patient's history or complaint suggestive of a possible infection? Yes  -    Are there at least two of the following signs and symptoms present? Yes  -    Sepsis signs/symptoms - Tachycardia Tachycardia     >90  -    Sepsis signs/symptoms - WBC WBC < 4,000 or WBC > 12,000  -    Are any of the following organ dysfunction criteria present and not considered to be due to a chronic condition? Yes  -    Organ Dysfunction Criteria Total Bilirubin > 2.0 Platelet count < 100,000  -    Organ Dysfunction Criteria INR > 1.5 or aPTT > 60  -    Initiate Sepsis Protocol No   Secure chat with DO -JM    Reason sepsis not considered Pt. receiving appropriate management  -              User Key  (r) = Recorded By, (t) = Taken By, (c) = Cosigned By      Initials Name    MORA Mclaughlin RN

## 2023-07-16 NOTE — RESIDENT HANDOFF
Handoff     Primary Team: Networked reference to record PCT  Room Number: 8073/8073 A     Patient Name: Marely Hamilton MRN: [id number]     Date of Birth: [id number] Allergies: Sulfa (sulfonamide antibiotics) and Codeine     Age: 58 y.o. Admit Date: 7/5/2023     Sex: female  BMI: Body mass index is 25.29 kg/m².     Code Status: DNR        Illness Level (current clinical status): Watcher - No    Reason for Admission: Gastrointestinal hemorrhage associated with duodenal ulcer    Brief HPI (pertinent PMH and diagnosis or differential diagnosis): 58 y.o. female with history of alcoholic cirrhosis, seizure disorder, DVT and IJ thrombus with recent admission for large retroperitoneal hemorrhage requiring IR embolization and IVC filter placement who presented for hematochezia. IR embolized GDA to stop duodenal ulcer bleeding.    Procedure Date: n/a    Hospital Course (updated, brief assessment by system or problem, significant events): Pt currently stable for stepdown. Weaned to NC 4L. Continue to build strength with PT/OT/Speech. Encourage chest physiotherapy and coughing.    Tasks (specific, using if-then statements): Encourage strength build up and working with therapy teams. Assess patient ability to cough secretions and possible NG tube removal if able to tolerate oral. Discuss with CM and family plan moving forward in regards to placement.    Contingency Plan (special circumstances anticipated and plan): n/a    Estimated Discharge Date: 7/20/23    Discharge Disposition:  Discuss with family and CM    Mentored By: Dr. Maryann Devi

## 2023-07-16 NOTE — ASSESSMENT & PLAN NOTE
Retroperitoneum: No significant adenopathy.  Similar sized left retroperitoneal hematoma measuring 11 x 9 cm (series 5, image 100; previously 11 x 9 cm).  The left iliacus collection also measures similar to prior at 5.4 cm (series 5, image 127; previously 5.6 cm).  Layering hyperdensity within these collections suggesting different ages in blood products.  No significant volume active extravasation into these collections  - CT-A demonstrated stable retroperitoneal hematoma. No need for intervention at this time.

## 2023-07-16 NOTE — ASSESSMENT & PLAN NOTE
Nutrition consulted. Most recent weight and BMI monitored-     Measurements:  Wt Readings from Last 1 Encounters:   07/16/23 62.7 kg (138 lb 4.4 oz)   Body mass index is 25.29 kg/m².    Patient has been screened and assessed by RD.    Malnutrition Type:  Context: social/environmental circumstances  Level: severe    Malnutrition Characteristic Summary:  Energy Intake (Malnutrition): less than or equal to 75% for greater than or equal to 1 month  Subcutaneous Fat (Malnutrition): severe depletion  Muscle Mass (Malnutrition): severe depletion    Interventions/Recommendations (treatment strategy):  1.     Plan  -Initiating tube feeds with above formulation  --NG tube placed and tube feedings initiated

## 2023-07-16 NOTE — ASSESSMENT & PLAN NOTE
- Maintain IV access with 2 large bore Ivs  - Intravascular resuscitation/support with IVFs   - Hold all NSAIDs and anticoagulants, unless contraindicated.  - Please correct any coagulopathy with platelets and FFP for goal of platelets >50K and INR <2.0  - Please notify GI team if there is significant change in patient's clinical status  - To date, patient has required at least 21 units of pRBCs to maintain Hgb >7     7/16     Patient's with macrocytic anemia.. Hemoglobin stable. Etiology likely due to acute blood loss.  Current CBC reviewed-    Recent Labs   Lab 07/14/23  0752 07/15/23  0350 07/16/23  0825   HGB 8.6* 8.1* 8.0*         Component Value Date/Time     (H) 07/16/2023 0825    RDW 24.7 (H) 07/16/2023 0825    IRON 132 05/18/2023 1527    FERRITIN 110 05/18/2023 1527    RETIC 3.9 (H) 06/05/2023 0935    FOLATE 16.2 05/31/2023 1250    [id number] >2000 (H) 05/31/2023 1250    OCCULTBLOOD Negative 09/19/2015 0137     Monitor CBC and transfuse if H/H drops below 7/21.

## 2023-07-16 NOTE — PROGRESS NOTES
Jimmy Phillip - Telemetry [facility] Medicine  Progress Note    Patient Name: Marely Hamilton  MRN: [id number]  Patient Class: IP- Inpatient   Admission Date: 7/5/2023  Length of Stay: 11 days  Attending Physician: Seth Noyola MD  Primary Care Provider: Viktor Ross MD        Subjective:     Principal Problem:Gastrointestinal hemorrhage associated with duodenal ulcer        HPI:  Marely Hamilton is a 58 y.o. female with history of alcoholic cirrhosis, seizure disorder, DVT and IJ thrombus with recent admission for large retroperitoneal hemorrhage requiring IR embolization and IVC filter placement who presents for hematochezia. Onset this morning at [facility], alida blood per rectum per chart, sent to ED. Initially normotensive but then severe hypotension prompted initiation of levophed and intubation. Hgb 6.8, 2u pRBC given + 1u FFP ordered. Hgb 8.5 on 7/2. On levo and vaso currently. K 6.6 but renal function at baseline. Repeat pending. No prior EGDs on record.          Overview/Hospital Course:    Patient was seen at bedside, hematochezia still present post op by IR. Patient has required many transfusions of pRBCs and platelets due to ongoing down trending of Hgb. No clear source of ongoing hemorrhage by imaging. Patient has continued to require pressors to maintain MAP >65. Discussed with GI and IR regarding active bleeding post op, IR indicated there is not much else to do from their end bc they embolized a significant part of GDA. Pt is off of pressor requirement and not actively bleeding. GI re evaluated pt via EGD and found no sources of active bleeding. Pt is extubated and currently on BIPAP requirement due to de saturation in the 80's. Pt is to be seen by speech and PT/OT. Clear mucinous secretions via suction, chest physiotherapy, and cough assist device. Nebs to help with breathing as well. Pt is off BIPAP and currently on comfort flow. Triple therapy (amoxicillin, clarithromycin) started for 14 days  to treat for positive H pylori serology. Pt also had a NG tube placed so we may begin tube feedings. CxR, EKG, and ABG displayed no reason for tachycardia. Pt becomes anxious when seen by different provider teams. Remove art line and consult for midline placement. Continue to wean comfort flow as tolerated. Ensure pt is working with PT/OT/Speech for continuous improvement. Consult psych regarding patient history of bipolar disorder.         Interval History/Significant Events: Wean comfort flow as tolerated. Ensure patient is working with PT/OT/Speech for continuous improvement. Consult psych regarding psych history     7/16   history of alcoholic cirrhosis, seizure disorder, DVT and IJ thrombus with recent admission for large retroperitoneal hemorrhage requiring IR embolization and IVC filter placement who presents for hematochezia. s/p GI and IR in which they clipped (GI) and embolized (IR) possible sources of duodenal ulcer bleeding.  Hb stable  s/p extubation. sats 92% on 5L of NC.  CX ray - Detrimental changes since the previous examination including interval increase in pulmonary vascularity and bilateral diffuse interstitial pulmonary parenchymal opacification, progression of abnormal opacification at the left lung base, and development of small right-sided pleural effusion.   findings would be consistent with congestive heart failure.PT/OT recs SNF. BNP , procalcitonin and Echo.  SLP recs NPO .  On NGT feeds. psychiatry following for bipolar disorders.  Recs Zyprexa 5 mg QHS . Ammonia 48 WNL. AAOX 1. Hepatology consulted for cirrhosis. UA ordered         Review of Systems:   Pain scale:   Constitutional:  fever,  chills, headache, vision loss, hearing loss, weight loss, Generalized weakness, falls, loss of smell, loss of taste, poor appetite,  sore throat  Respiratory: cough, shortness of breath.   Cardiovascular: chest pain, dizziness, palpitations, orthopnea, swelling of feet, syncope  Gastrointestinal:  nausea, vomiting, abdominal pain, diarrhea, black stool,  blood in stool, change in bowel habits  Genitourinary: hematuria, dysuria, urgency, frequency  Integument/Breast: rash,  pruritis  Hematologic/Lymphatic: easy bruising, lymphadenopathy  Musculoskeletal: arthralgias , myalgias, back pain, neck pain, knee pain  Neurological: confusion, seizures, tremors, slurred speech  Behavioral/Psych:  depression, anxiety, auditory or visual hallucinations     OBJECTIVE:     Physical Exam:  Body mass index is 25.29 kg/m².    Constitutional: Appears well-developed and well-nourished.   Head: Normocephalic and atraumatic. + icterus  Neck: Normal range of motion. Neck supple. NGT in place   Cardiovascular: Normal heart rate.  Regular heart rhythm.  Pulmonary/Chest: Effort normal.   Abdominal: No distension.  No tenderness. foleys and rectal tube in place   Musculoskeletal: Normal range of motion. ++ edema bilateral UE and LE  Neurological: Alert and oriented to self  follows simple commands   Skin: Skin is warm and dry. ehcymoses on the upper chest    Psychiatric: Normal mood and affect. Behavior is normal.                  Vital Signs  Temp: 99 °F (37.2 °C) (07/16/23 1139)  Pulse: 105 (07/16/23 1139)  Resp: 18 (07/16/23 0926)  BP: (Abnormal) 154/88 (07/16/23 1248)  SpO2: (Abnormal) 92 % (07/16/23 1139)     24 Hour VS Range    Temp:  [97.4 °F (36.3 °C)-99.5 °F (37.5 °C)]   Pulse:  []   Resp:  [13-24]   BP: (107-154)/(54-88)   SpO2:  [91 %-100 %]     Intake/Output Summary (Last 24 hours) at 7/16/2023 1254  Last data filed at 7/16/2023 0738  Gross per 24 hour   Intake 1980 ml   Output 1615 ml   Net 365 ml         I/O This Shift:  I/O this shift:  In: -   Out: 75 [Urine:75]    Wt Readings from Last 3 Encounters:   07/16/23 62.7 kg (138 lb 4.4 oz)   07/03/23 44.7 kg (98 lb 8.7 oz)   06/29/23 59.9 kg (132 lb 0.9 oz)       I have personally reviewed the vitals and recorded Intake/Output     Laboratory/Diagnostic  Data:    CBC/Anemia Labs: Coags:    Recent Labs   Lab 07/14/23  0752 07/15/23  0350 07/16/23  0825   WBC 7.35 3.79* 3.76*   HGB 8.6* 8.1* 8.0*   HCT 27.2* 25.5* 26.0*   PLT 63* 42* 41*   MCV 98 101* 102*   RDW 24.3* 24.9* 24.7*    Recent Labs   Lab 07/13/23  2026 07/14/23  0407 07/14/23  0752 07/15/23  0350 07/16/23  0451   INR 1.7*  --  1.7* 1.8*  --    APTT  --  30.1  --  28.2 33.4*        Chemistries: ABG:   Recent Labs   Lab 07/14/23  0407 07/15/23  0350 07/16/23  0451 07/16/23  1201    148* 145  --    K 3.6 3.8 4.0  --    * 120* 118*  --    CO2 18* 21* 22*  --    BUN 8 8 7  --    CREATININE 0.4* 0.4* 0.4*  --    CALCIUM 8.1* 7.9* 7.8*  --    PROT 4.9* 4.6* 4.4*  --    BILITOT 4.0* 3.5* 3.0*  --    ALKPHOS 82 94 124  --    ALT 10 10 11  --    AST 26 26 24  --    MG 1.7 1.9 1.6  --    PHOS 2.4* 1.6* 2.3* 2.4*    Recent Labs   Lab 07/13/23  1044   PH 7.429   PCO2 34.5*   PO2 150*   HCO3 22.9*   POCSATURATED 99   BE -1        POCT Glucose: HbA1c:    Recent Labs   Lab 07/14/23  1709 07/15/23  1305 07/15/23  1810 07/15/23  2328 07/16/23  0451 07/16/23  1133   POCTGLUCOSE 88 153* 137* 159* 149* 141*    Hemoglobin A1C   Date Value Ref Range Status   05/29/2023 <4.0 (A) 4.0 - 5.6 % Final     Comment:     ADA Screening Guidelines:  5.7-6.4%  Consistent with prediabetes  >or=6.5%  Consistent with diabetes    High levels of fetal hemoglobin interfere with the HbA1C  assay. Heterozygous hemoglobin variants (HbS, HgC, etc)do  not significantly interfere with this assay.   However, presence of multiple variants may affect accuracy.  Falsely low HA1c results may be observed in patients   with clinical conditions that shorten erythrocyte   life span or decrease mean erythrocyte age.  HA1c   may not accurately reflect glycemic control when   clinical conditions that affect erythrocyte survival   are present. Fructosamine may be used as an alternate  measurement of glycemic control.     01/22/2021 4.1 4.0 - 5.6 %  Final     Comment:     ADA Screening Guidelines:  5.7-6.4%  Consistent with prediabetes  >or=6.5%  Consistent with diabetes  High levels of fetal hemoglobin interfere with the HbA1C  assay. Heterozygous hemoglobin variants (HbS, HgC, etc)do  not significantly interfere with this assay.   However, presence of multiple variants may affect accuracy.     12/10/2020 4.6 4.0 - 5.6 % Final     Comment:     ADA Screening Guidelines:  5.7-6.4%  Consistent with prediabetes  >or=6.5%  Consistent with diabetes  High levels of fetal hemoglobin interfere with the HbA1C  assay. Heterozygous hemoglobin variants (HbS, HgC, etc)do  not significantly interfere with this assay.   However, presence of multiple variants may affect accuracy.          Cardiac Enzymes: Ejection Fractions:    No results for input(s): CPK, CPKMB, MB, TROPONINI in the last 72 hours. EF   Date Value Ref Range Status   06/03/2023 70 % Final   05/29/2023 65 % Final          No results for input(s): COLORU, APPEARANCEUA, PHUR, SPECGRAV, PROTEINUA, GLUCUA, KETONESU, BILIRUBINUA, OCCULTUA, NITRITE, UROBILINOGEN, LEUKOCYTESUR, RBCUA, WBCUA, BACTERIA, SQUAMEPITHEL, HYALINECASTS in the last 48 hours.    Invalid input(s): WRIGHTSUR    Procalcitonin (ng/mL)   Date Value   06/16/2023 0.26 (H)     Lactate (Lactic Acid) (mmol/L)   Date Value   07/16/2023 1.4   07/06/2023 1.5   07/05/2023 8.0 (HH)   06/11/2023 6.2 (HH)   06/04/2023 1.6     No results found for: BNP  No results found for: CRP, SEDRATE  D-Dimer (mg/L FEU)   Date Value   07/07/2023 2.22 (H)     Ferritin (ng/mL)   Date Value   05/18/2023 110   10/23/2015 412 (H)   09/19/2015 599 (H)   09/19/2015 599 (H)   07/23/2015 551 (H)   06/17/2015 612 (H)     LD (U/L)   Date Value   06/05/2023 249   05/31/2023 426 (H)   09/19/2015 280 (H)     Troponin I (ng/mL)   Date Value   06/13/2023 0.029 (H)   06/13/2023 0.030 (H)   05/18/2023 0.035 (H)   05/18/2023 0.037 (H)   05/05/2023 <0.006   01/25/2023 <0.006     CPK (U/L)    Date Value   05/05/2023 47   01/25/2023 27   06/14/2020 23 (L)     CK (U/L)   Date Value   04/15/2023 98     Results for orders placed or performed during the hospital encounter of 05/18/23   Vitamin D   Result Value Ref Range    Vit D, 25-Hydroxy 15 (L) 30 - 96 ng/mL     SARS-CoV2 (COVID-19) Qualitative PCR (no units)   Date Value   06/30/2023 Not Detected   02/14/2023 Not Detected     SARS-CoV-2 RNA, Amplification, Qual (no units)   Date Value   05/28/2023 Negative   11/02/2021 Negative   01/21/2021 Negative   01/11/2021 Negative   11/21/2020 Negative   06/16/2020 Negative       Microbiology labs for the last week  Microbiology Results (last 7 days)       ** No results found for the last 168 hours. **            Reviewed and noted in plan where applicable- Please see chart for full lab data.    Lines/Drains:       Peripheral IV - Single Lumen 07/12/23 1148 20 G;1 3/4 in Left Forearm (Active)   Site Assessment Clean;Dry;Intact 07/16/23 0701   Extremity Assessment Distal to IV No abnormal discoloration 07/16/23 0701   Line Status No blood return;Flushed;Saline locked 07/16/23 0701   Dressing Status Dry;Clean;Intact 07/16/23 0701   Dressing Intervention Integrity maintained 07/16/23 0701   Dressing Change Due 07/16/23 07/16/23 0701   Site Change Due 07/16/23 07/16/23 0701   Reason Not Rotated Not due 07/16/23 0701   Number of days: 3            Peripheral IV - Single Lumen 07/14/23 1540 20 G;1 3/4 in Right Upper Arm (Active)   Site Assessment Intact;Clean;Dry 07/16/23 0701   Extremity Assessment Distal to IV No abnormal discoloration 07/16/23 0701   Line Status Blood return noted;Flushed;Saline locked 07/16/23 0701   Dressing Status Dry;Intact;Clean 07/16/23 0701   Dressing Intervention Integrity maintained 07/16/23 0701   Dressing Change Due 07/18/23 07/16/23 0701   Site Change Due 07/18/23 07/16/23 0701   Reason Not Rotated Not due 07/16/23 0701   Number of days: 1            NG/OG Tube 07/13/23 1311 Yazmin shen  "18 Fr. Right nostril (Active)   Placement Check placement verified by aspirate characteristics 07/16/23 0701   Tolerance no signs/symptoms of discomfort 07/16/23 0701   Securement secured to nostril center w/ adhesive device 07/16/23 0701   Clamp Status/Tolerance unclamped 07/16/23 0701   Suction Setting/Drainage Method suction at the bedside 07/16/23 0701   Insertion Site Appearance no redness, warmth, tenderness, skin breakdown, drainage 07/16/23 0701   Flush/Irrigation flushed w/;water 07/16/23 0502   Feeding Type continuous;by pump 07/16/23 0701   Feeding Action feeding continued 07/16/23 0701   Current Rate (mL/hr) 50 mL/hr 07/16/23 0701   Goal Rate (mL/hr) 50 mL/hr 07/16/23 0701   Intake (mL) 20 mL 07/15/23 0800   Water Bolus (mL) 250 mL 07/16/23 0501   Rate Formula Tube Feeding (mL/hr) 20 mL/hr 07/14/23 0400   Formula Name Isosource 1.5 07/16/23 0701   Intake (mL) - Formula Tube Feeding 50 07/16/23 0700   Residual Amount (ml) 30 ml 07/15/23 2337   Number of days: 2            Urethral Catheter 07/05/23 1332 Double-lumen (Active)   Site Assessment Clean;Intact 07/16/23 0701   Collection Container Urimeter 07/16/23 0701   Securement Method secured to top of thigh w/ adhesive device 07/16/23 0701   Catheter Care Performed yes 07/16/23 0701   Reason for Continuing Urinary Catheterization Non-healing sacral/perineal wound 07/16/23 0701   CAUTI Prevention Bundle Securement Device in place with 1" slack;Intact seal between catheter & drainage tubing;Drainage bag/urimeter off the floor;Sheeting clip in use;No dependent loops or kinks;Drainage bag/urimeter not overfilled (<2/3 full);Drainage bag/urimeter below bladder 07/16/23 0701   Output (mL) 75 mL 07/16/23 0738   Number of days: 10            Fecal Incontinence  07/09/23 2300 (Active)   $ Fecal Management Supplies Fecal Management System (Supply) 07/14/23 1954   Application fecal incontinence  in place 07/16/23 0701   Drainage Method attached to " drainage bag 07/16/23 0701   Securement to gravity 07/16/23 0701   Skin cleansed, skin barrier applied 07/16/23 0701   Tolerance no signs/symptoms of discomfort 07/16/23 0701   Stool (mL) 200 mL 07/16/23 0501   Number of days: 6       Imaging  ECG Results              EKG 12-lead (Final result)  Result time 07/05/23 13:56:05      Final result by Interface, Lab In [facility] (07/05/23 13:56:05)               Narrative:    Test Reason : E87.5,    Vent. Rate : 103 BPM     Atrial Rate : 103 BPM     P-R Int : 132 ms          QRS Dur : 076 ms      QT Int : 342 ms       P-R-T Axes : 073 054 084 degrees     QTc Int : 448 ms    Age and gender specific analysis  Sinus tachycardia  Low voltage QRS  Low anterior forces and R wave progression  Possibly acute STEMI    ACUTE MI / STEMI    Abnormal ECG  When compared with ECG of 05-JUL-2023 11:07,  No significant change was found  Confirmed by Abimael CLEMENTS MD ([id number]) on 7/5/2023 1:55:55 PM    Referred By: [email or web address]   SELF           Confirmed By:Abimael CLEMENTS MD                                   EKG 12-lead (Final result)  Result time 07/05/23 14:01:06      Final result by Interface, Lab In [facility] (07/05/23 14:01:06)               Narrative:    Test Reason : K92.2,    Vent. Rate : 096 BPM     Atrial Rate : 096 BPM     P-R Int : 114 ms          QRS Dur : 066 ms      QT Int : 352 ms       P-R-T Axes : 078 090 065 degrees     QTc Int : 444 ms    Normal sinus rhythm  Vertical axis  Otherwise normal ECG  When compared with ECG of 27-JUN-2023 12:05,  T wave inversion no longer evident in Anterior leads  Confirmed by Cameron Hodge MD ([id number]) on 7/5/2023 2:01:00 PM    Referred By: System System           Confirmed By:Cameron Hodge MD                                  Results for orders placed during the hospital encounter of 05/28/23    Echo    Interpretation Summary  · The left ventricle is normal in size with hyperdynamic systolic function. The estimated ejection fraction is 70%.  · Normal right  ventricular size with normal right ventricular systolic function.  · Normal left ventricular diastolic function.  · Mild left atrial enlargement.  · Mechanically ventilated; cannot use inferior caval vein diameter to estimate central venous pressure.  · Trivial pericardial effusion.  · There is a left pleural effusion.      X-Ray Chest 1 View  Narrative: EXAMINATION:  XR CHEST 1 VIEW    CLINICAL HISTORY:  hypoxia;    TECHNIQUE:  Single frontal portable view of the chest was performed.    COMPARISON:  July 13, 2023 at 10:38    FINDINGS:  Enteric tube has been inserted since the previous examination.  The cardiac silhouette is mildly enlarged and stable.  Pulmonary vascularity is increased, and this has progressed.  Since the previous examination, there has been slight interval progression in the bilateral increased interstitial opacification with differential considerations including edema and infection.  There is new focal opacification at the left lung base consistent with a combination of pleural fluid and atelectasis.  The right hemidiaphragm it is indistinct with blunting of the right lateral costophrenic sulcus suggesting a small amount of right-sided pleural fluid as well.  No pneumothorax.  Visualized osseous structures are stable.  Impression: Detrimental changes since the previous examination including interval increase in pulmonary vascularity and bilateral diffuse interstitial pulmonary parenchymal opacification, progression of abnormal opacification at the left lung base, and development of small right-sided pleural effusion.  In the appropriate clinical setting, the findings would be consistent with congestive heart failure.    This report was flagged in Epic as abnormal.    Electronically signed by: Prema Nieves MD  Date:    07/16/2023  Time:    12:29      Labs, Imaging, EKG and Diagnostic results from 7/16/2023 were reviewed.    Medications:  Medication list was reviewed and changes noted under  Assessment/Plan.  No current facility-administered medications on file prior to encounter.     Current Outpatient Medications on File Prior to Encounter   Medication Sig Dispense Refill    ARIPiprazole (ABILIFY) 20 MG Tab Take 5 mg by mouth once daily.      folic acid (FOLVITE) 1 MG tablet Take 1 tablet (1 mg total) by mouth once daily. (Patient taking differently: Take 1 mg by mouth every evening.) 30 tablet 2    furosemide (LASIX) 20 MG tablet Take 20 mg by mouth once daily.      lactulose (CHRONULAC) 10 gram/15 mL solution Take 10 g by mouth 3 (three) times daily.      lithium carbonate 150 MG capsule Take 1 capsule (150 mg total) by mouth every evening. (Patient taking differently: Take 150 mg by mouth 2 (two) times a day.) 30 capsule 11    magnesium oxide (MAG-OX) 400 mg (241.3 mg magnesium) tablet Take by mouth once daily.      pantoprazole (PROTONIX) 40 MG tablet Take 40 mg by mouth once daily.      propranoloL (INDERAL) 40 MG tablet Take 40 mg by mouth once daily.      QUEtiapine (SEROQUEL) 300 MG Tab Take 100 mg by mouth every evening.      sodium bicarbonate 650 MG tablet Take 650 mg by mouth Daily.      spironolactone (ALDACTONE) 50 MG tablet Take 1 tablet (50 mg total) by mouth once daily. 90 tablet 3    zonisamide (ZONEGRAN) 100 MG Cap Take 100 mg by mouth once daily.      levETIRAcetam (KEPPRA) 1000 MG tablet Take 1,000 mg by mouth 2 (two) times daily.      OLANZapine (ZYPREXA) 10 MG tablet Take 1 tablet (10 mg total) by mouth every evening. (Patient not taking: Reported on 7/6/2023) 30 tablet 11    rifAXIMin (XIFAXAN) 550 mg Tab Take 1 tablet (550 mg total) by mouth 2 (two) times daily. (Patient not taking: Reported on 7/6/2023) 180 tablet 3     Scheduled Medications:  acetylcysteine 200 mg/ml (20%), 2 mL, Nebulization, TID  albuterol-ipratropium, 3 mL, Nebulization, TID  amoxicillin, 1,000 mg, Per NG tube, Q12H  clarithromycin, 500 mg, Per NG tube, Q12H  levetiracetam, 1,000 mg, Per NG tube,  BID  OLANZapine, 5 mg, Per NG tube, QHS  pantoprazole, 40 mg, Intravenous, BID      PRN: dextrose 10%, dextrose 10%, glucagon (human recombinant), hydrOXYzine HCL, insulin aspart U-100, sodium chloride 0.9%  Infusions:       Estimated Creatinine Clearance: 135 mL/min (A) (based on SCr of 0.4 mg/dL (L)).    Assessment/Plan:      * Gastrointestinal hemorrhage associated with duodenal ulcer    as  above        Hypoxia   7/16 sats 92% on 5L of NC.  CX ray - Detrimental changes since the previous examination including interval increase in pulmonary vascularity and bilateral diffuse interstitial pulmonary parenchymal opacification, progression of abnormal opacification at the left lung base, and development of small right-sided pleural effusion.   findings would be consistent with congestive heart failure.PT/OT recs SNF. BNP , procalcitonin and Echo.     H. pylori infection    positive serology for H pylori, begin triple therapy with clarithromycin,amoxicillin, and PPI for 14 days       Hematochezia    - GI EGD clipped duodenal ulcers for IR reference  - Transfuse blood products for active bleeding   - trend CBC and follow  - IR embolized GDA for duodenal hyperemia   -- GI re evaluated site of duodenal ulcers, no noted active bleeding  -- Continue PPI BID for 8 weeks, then once daily after that  --Pt had positive serology for H pylori, begin triple therapy with clarithromycin,amoxicillin, and PPI for 14 days      Acute blood loss anemia     - Maintain IV access with 2 large bore Ivs  - Intravascular resuscitation/support with IVFs   - Hold all NSAIDs and anticoagulants, unless contraindicated.  - Please correct any coagulopathy with platelets and FFP for goal of platelets >50K and INR <2.0  - Please notify GI team if there is significant change in patient's clinical status  - To date, patient has required at least 21 units of pRBCs to maintain Hgb >7     7/16     Patient's with macrocytic anemia.. Hemoglobin stable.  Etiology likely due to acute blood loss.  Current CBC reviewed-    Recent Labs   Lab 07/14/23  0752 07/15/23  0350 07/16/23  0825   HGB 8.6* 8.1* 8.0*         Component Value Date/Time     (H) 07/16/2023 0825    RDW 24.7 (H) 07/16/2023 0825    IRON 132 05/18/2023 1527    FERRITIN 110 05/18/2023 1527    RETIC 3.9 (H) 06/05/2023 0935    FOLATE 16.2 05/31/2023 1250    [id number] >2000 (H) 05/31/2023 1250    OCCULTBLOOD Negative 09/19/2015 0137     Monitor CBC and transfuse if H/H drops below 7/21.        Retroperitoneal bleed  Retroperitoneum: No significant adenopathy.  Similar sized left retroperitoneal hematoma measuring 11 x 9 cm (series 5, image 100; previously 11 x 9 cm).  The left iliacus collection also measures similar to prior at 5.4 cm (series 5, image 127; previously 5.6 cm).  Layering hyperdensity within these collections suggesting different ages in blood products.  No significant volume active extravasation into these collections  - CT-A demonstrated stable retroperitoneal hematoma. No need for intervention at this time.          Bipolar 1 disorder     - Consult psych regarding Bipolar 1 disorder history       Hepatic encephalopathy   ammonia 190s on admit -->90s . AAOX 1. no lactulose give due to GI bleed  repeat ammonia. Hepatolog eval  with     MELD 3.0: 20 at 7/16/2023  4:51 AM  MELD-Na: 17 at 7/16/2023  4:51 AM  Calculated from:  Serum Creatinine: 0.4 mg/dL (Using min of 1 mg/dL) at 7/16/2023  4:51 AM  Serum Sodium: 145 mmol/L (Using max of 137 mmol/L) at 7/16/2023  4:51 AM  Total Bilirubin: 3.0 mg/dL at 7/16/2023  4:51 AM  Serum Albumin: 2.3 g/dL at 7/16/2023  4:51 AM  INR(ratio): 1.8 at 7/15/2023  3:50 AM  Age at listing (hypothetical): 58 years  Sex: Female at 7/16/2023  4:51 AM  7/16 Ammonia 48 WNL. AAOX 1. Hepatology consulted for cirrhosis. UA ordered     Severe protein-calorie malnutrition  Nutrition consulted. Most recent weight and BMI monitored-     Measurements:  Wt Readings from  Last 1 Encounters:   07/16/23 62.7 kg (138 lb 4.4 oz)   Body mass index is 25.29 kg/m².    Patient has been screened and assessed by RD.    Malnutrition Type:  Context: social/environmental circumstances  Level: severe    Malnutrition Characteristic Summary:  Energy Intake (Malnutrition): less than or equal to 75% for greater than or equal to 1 month  Subcutaneous Fat (Malnutrition): severe depletion  Muscle Mass (Malnutrition): severe depletion      History of seizure  on Keppra via NGT       Thrombocytopenia  with cirrhosis   Patient with thrombocytopenia   Recent Labs   Lab 07/14/23  0752 07/15/23  0350 07/16/23  0825   PLT 63* 42* 41*   . Platelet counts stable  .monitor      Cirrhosis, Laennec's  alcoholic cirrhosis  MELD 3.0: 20 at 7/16/2023  4:51 AM  MELD-Na: 17 at 7/16/2023  4:51 AM  Calculated from:  Serum Creatinine: 0.4 mg/dL (Using min of 1 mg/dL) at 7/16/2023  4:51 AM  Serum Sodium: 145 mmol/L (Using max of 137 mmol/L) at 7/16/2023  4:51 AM  Total Bilirubin: 3.0 mg/dL at 7/16/2023  4:51 AM  Serum Albumin: 2.3 g/dL at 7/16/2023  4:51 AM  INR(ratio): 1.8 at 7/15/2023  3:50 AM  Age at listing (hypothetical): 58 years  Sex: Female at 7/16/2023  4:51 AM     Recent Labs   Lab 07/16/23  1201   AMMONIA 48     Strict input /output monitor, daily weights        VTE Risk Mitigation (From admission, onward)           Ordered     Place sequential compression device  Until discontinued         07/14/23 1024     Reason for No Pharmacological VTE Prophylaxis  Once        Question:  Reasons:  Answer:  Active Bleeding    07/05/23 1334     IP VTE HIGH RISK PATIENT  Once         07/05/23 1334     Place sequential compression device  Until discontinued         07/05/23 1334                    Discharge Planning   BRAYAN: 7/18/2023     Code Status: DNR   Is the patient medically ready for discharge?: (No Documentation)    Reason for patient still in hospital (select all that apply): Treatment  Discharge Plan A: Skilled Nursing  Facility   Discharge Delays: None known at this time              Seth Noyola MD  Department of Hospital Medicine   [facility] - Telemetry Stepdown

## 2023-07-16 NOTE — ASSESSMENT & PLAN NOTE
Nutrition consulted. Most recent weight and BMI monitored-     Measurements:  Wt Readings from Last 1 Encounters:   07/16/23 62.7 kg (138 lb 4.4 oz)   Body mass index is 25.29 kg/m².    Patient has been screened and assessed by RD.    Malnutrition Type:  Context: social/environmental circumstances  Level: severe    Malnutrition Characteristic Summary:  Energy Intake (Malnutrition): less than or equal to 75% for greater than or equal to 1 month  Subcutaneous Fat (Malnutrition): severe depletion  Muscle Mass (Malnutrition): severe depletion

## 2023-07-16 NOTE — ASSESSMENT & PLAN NOTE
- GI EGD clipped duodenal ulcers for IR reference  - Transfuse blood products for active bleeding   - trend CBC and follow  - IR embolized GDA for duodenal hyperemia   -- GI re evaluated site of duodenal ulcers, no noted active bleeding  -- Continue PPI BID for 8 weeks, then once daily after that  --Pt had positive serology for H pylori, begin triple therapy with clarithromycin,amoxicillin, and PPI for 14 days    -- Patient is stable from ICU standpoint. Continue NC,chest physiotherapy, and speech evaluations. Patient to be assessed to have NG tube removed once develops stronger cough and removes secretions. Continue PT/OT to continue to build strength. Once patient is tolerating oral medications begin lithium home medication regime. Discuss with CM regarding placement once patient is stable for discharge, pt was brought in from SNF.

## 2023-07-16 NOTE — SUBJECTIVE & OBJECTIVE
Interval History/Significant Events: Pt stable for stepdown. Currently on 4L of oxygen NC.    Review of Systems   Unable to perform ROS: Patient unresponsive   Objective:     Vital Signs (Most Recent):  Temp: 99.5 °F (37.5 °C) (07/16/23 0700)  Pulse: 95 (07/16/23 0926)  Resp: 18 (07/16/23 0926)  BP: (!) 117/56 (07/16/23 0900)  SpO2: 98 % (07/16/23 0926) Vital Signs (24h Range):  Temp:  [98.1 °F (36.7 °C)-99.5 °F (37.5 °C)] 99.5 °F (37.5 °C)  Pulse:  [] 95  Resp:  [13-24] 18  SpO2:  [91 %-100 %] 98 %  BP: (107-144)/(54-68) 117/56   Weight: 62.7 kg (138 lb 4.4 oz)  Body mass index is 25.29 kg/m².      Intake/Output Summary (Last 24 hours) at 7/16/2023 0956  Last data filed at 7/16/2023 0738  Gross per 24 hour   Intake 2060 ml   Output 1715 ml   Net 345 ml            Physical Exam  Constitutional:       Appearance: She is ill-appearing and toxic-appearing.   HENT:      Head: Normocephalic.   Eyes:      General: Scleral icterus present.   Neck:      Thyroid: No thyroid mass or thyroid tenderness.      Vascular: No carotid bruit or hepatojugular reflux.   Cardiovascular:      Rate and Rhythm: Normal rate.      Pulses: Decreased pulses.      Heart sounds: Heart sounds are distant.   Chest:      Chest wall: No mass, lacerations or deformity.   Breasts:     Right: No swelling.      Left: No swelling.   Abdominal:      General: Abdomen is flat.      Palpations: Abdomen is rigid.      Hernia: No hernia is present.   Genitourinary:     Vagina: Normal.   Musculoskeletal:      Cervical back: Rigidity present.   Lymphadenopathy:      Cervical: No cervical adenopathy.   Skin:     Coloration: Skin is jaundiced.      Findings: Ecchymosis present.   Neurological:      Mental Status: She is unresponsive.          Vents:  Vent Mode: (S) Spont (07/11/23 1154)  Ventilator Initiated: Yes (07/05/23 1221)  Set Rate: 18 BPM (07/11/23 1135)  Vt Set: 300 mL (07/11/23 1135)  Pressure Support: 5 cmH20 (07/11/23 1154)  PEEP/CPAP: 5 cmH20  (07/11/23 1154)  Oxygen Concentration (%): 40 (07/15/23 1700)  Peak Airway Pressure: 10 cmH20 (07/11/23 1154)  Plateau Pressure: 0 cmH20 (07/11/23 1154)  Total Ve: 6.51 L/m (07/11/23 1154)  Negative Inspiratory Force (cm H2O): 0 (07/11/23 1154)  F/VT Ratio<105 (RSBI): (!) 41.56 (07/11/23 1154)  Lines/Drains/Airways       Drain  Duration                  Urethral Catheter 07/05/23 1332 Double-lumen 10 days         Fecal Incontinence  07/09/23 2300 6 days         NG/OG Tube 07/13/23 1311 [place] sump 18 Fr. Right nostril 2 days              Peripheral Intravenous Line  Duration                  Peripheral IV - Single Lumen 07/12/23 1148 20 G;1 3/4 in Left Forearm 3 days         Peripheral IV - Single Lumen 07/14/23 1540 20 G;1 3/4 in Right Upper Arm 1 day                  Significant Labs:    CBC/Anemia Profile:  Recent Labs   Lab 07/15/23  0350 07/16/23  0825   WBC 3.79* 3.76*   HGB 8.1* 8.0*   HCT 25.5* 26.0*   PLT 42* 41*   * 102*   RDW 24.9* 24.7*          Chemistries:  Recent Labs   Lab 07/15/23  0350 07/16/23  0451   * 145   K 3.8 4.0   * 118*   CO2 21* 22*   BUN 8 7   CREATININE 0.4* 0.4*   CALCIUM 7.9* 7.8*   ALBUMIN 2.5* 2.3*   PROT 4.6* 4.4*   BILITOT 3.5* 3.0*   ALKPHOS 94 124   ALT 10 11   AST 26 24   MG 1.9 1.6   PHOS 1.6*  --          All pertinent labs within the past 24 hours have been reviewed.    Significant Imaging:  I have reviewed all pertinent imaging results/findings within the past 24 hours.

## 2023-07-17 LAB
ALBUMIN SERPL BCP-MCNC: 2.2 G/DL (ref 3.5–5.2)
ALP SERPL-CCNC: 143 U/L (ref 55–135)
ALT SERPL W/O P-5'-P-CCNC: 11 U/L (ref 10–44)
AMMONIA PLAS-SCNC: 59 UMOL/L (ref 10–50)
ANION GAP SERPL CALC-SCNC: 5 MMOL/L (ref 8–16)
ANISOCYTOSIS BLD QL SMEAR: SLIGHT
APTT PPP: 28.2 SEC (ref 21–32)
AST SERPL-CCNC: 24 U/L (ref 10–40)
BACTERIA #/AREA URNS AUTO: ABNORMAL /HPF
BASOPHILS # BLD AUTO: 0.02 K/UL (ref 0–0.2)
BASOPHILS NFR BLD: 0.6 % (ref 0–1.9)
BILIRUB SERPL-MCNC: 3 MG/DL (ref 0.1–1)
BILIRUB UR QL STRIP: NEGATIVE
BUN SERPL-MCNC: 8 MG/DL (ref 6–20)
BURR CELLS BLD QL SMEAR: ABNORMAL
CA-I BLDV-SCNC: 1.12 MMOL/L (ref 1.06–1.42)
CA-I BLDV-SCNC: 1.19 MMOL/L (ref 1.06–1.42)
CALCIUM SERPL-MCNC: 7.9 MG/DL (ref 8.7–10.5)
CHLORIDE SERPL-SCNC: 118 MMOL/L (ref 95–110)
CLARITY UR REFRACT.AUTO: ABNORMAL
CO2 SERPL-SCNC: 20 MMOL/L (ref 23–29)
COLOR UR AUTO: YELLOW
CREAT SERPL-MCNC: 0.4 MG/DL (ref 0.5–1.4)
DIFFERENTIAL METHOD: ABNORMAL
EOSINOPHIL # BLD AUTO: 0.1 K/UL (ref 0–0.5)
EOSINOPHIL NFR BLD: 2.9 % (ref 0–8)
ERYTHROCYTE [DISTWIDTH] IN BLOOD BY AUTOMATED COUNT: 25.2 % (ref 11.5–14.5)
EST. GFR  (NO RACE VARIABLE): >60 ML/MIN/1.73 M^2
FOLATE SERPL-MCNC: 11.5 NG/ML (ref 4–24)
GLUCOSE SERPL-MCNC: 142 MG/DL (ref 70–110)
GLUCOSE UR QL STRIP: NEGATIVE
HCT VFR BLD AUTO: 26 % (ref 37–48.5)
HGB BLD-MCNC: 7.6 G/DL (ref 12–16)
HGB UR QL STRIP: ABNORMAL
HYPOCHROMIA BLD QL SMEAR: ABNORMAL
IMM GRANULOCYTES # BLD AUTO: 0.02 K/UL (ref 0–0.04)
IMM GRANULOCYTES NFR BLD AUTO: 0.6 % (ref 0–0.5)
INR PPP: 1.9 (ref 0.8–1.2)
KETONES UR QL STRIP: NEGATIVE
LEUKOCYTE ESTERASE UR QL STRIP: ABNORMAL
LYMPHOCYTES # BLD AUTO: 0.5 K/UL (ref 1–4.8)
LYMPHOCYTES NFR BLD: 12.9 % (ref 18–48)
MAGNESIUM SERPL-MCNC: 1.7 MG/DL (ref 1.6–2.6)
MCH RBC QN AUTO: 31.3 PG (ref 27–31)
MCHC RBC AUTO-ENTMCNC: 29.2 G/DL (ref 32–36)
MCV RBC AUTO: 107 FL (ref 82–98)
MICROSCOPIC COMMENT: ABNORMAL
MONOCYTES # BLD AUTO: 0.2 K/UL (ref 0.3–1)
MONOCYTES NFR BLD: 6 % (ref 4–15)
NEUTROPHILS # BLD AUTO: 2.7 K/UL (ref 1.8–7.7)
NEUTROPHILS NFR BLD: 77 % (ref 38–73)
NITRITE UR QL STRIP: NEGATIVE
NRBC BLD-RTO: 0 /100 WBC
OVALOCYTES BLD QL SMEAR: ABNORMAL
PH UR STRIP: 8 [PH] (ref 5–8)
PLATELET # BLD AUTO: 34 K/UL (ref 150–450)
PMV BLD AUTO: 10.5 FL (ref 9.2–12.9)
POCT GLUCOSE: 140 MG/DL (ref 70–110)
POCT GLUCOSE: 172 MG/DL (ref 70–110)
POCT GLUCOSE: 180 MG/DL (ref 70–110)
POIKILOCYTOSIS BLD QL SMEAR: SLIGHT
POLYCHROMASIA BLD QL SMEAR: ABNORMAL
POTASSIUM SERPL-SCNC: 4.1 MMOL/L (ref 3.5–5.1)
PROT SERPL-MCNC: 4.5 G/DL (ref 6–8.4)
PROT UR QL STRIP: ABNORMAL
PROTHROMBIN TIME: 19.8 SEC (ref 9–12.5)
RBC # BLD AUTO: 2.43 M/UL (ref 4–5.4)
RBC #/AREA URNS AUTO: 7 /HPF (ref 0–4)
SCHISTOCYTES BLD QL SMEAR: ABNORMAL
SODIUM SERPL-SCNC: 143 MMOL/L (ref 136–145)
SP GR UR STRIP: 1.01 (ref 1–1.03)
SQUAMOUS #/AREA URNS AUTO: 3 /HPF
URN SPEC COLLECT METH UR: ABNORMAL
VIT B12 SERPL-MCNC: 947 PG/ML (ref 210–950)
WBC # BLD AUTO: 3.49 K/UL (ref 3.9–12.7)
WBC #/AREA URNS AUTO: 73 /HPF (ref 0–5)
YEAST UR QL AUTO: ABNORMAL

## 2023-07-17 PROCEDURE — 82140 ASSAY OF AMMONIA: CPT | Performed by: HOSPITALIST

## 2023-07-17 PROCEDURE — 25000003 PHARM REV CODE 250

## 2023-07-17 PROCEDURE — 63600175 PHARM REV CODE 636 W HCPCS: Performed by: STUDENT IN AN ORGANIZED HEALTH CARE EDUCATION/TRAINING PROGRAM

## 2023-07-17 PROCEDURE — 85730 THROMBOPLASTIN TIME PARTIAL: CPT

## 2023-07-17 PROCEDURE — 82746 ASSAY OF FOLIC ACID SERUM: CPT | Performed by: HOSPITALIST

## 2023-07-17 PROCEDURE — 85025 COMPLETE CBC W/AUTO DIFF WBC: CPT

## 2023-07-17 PROCEDURE — 99232 PR SUBSEQUENT HOSPITAL CARE,LEVL II: ICD-10-PCS | Mod: ,,, | Performed by: PSYCHIATRY & NEUROLOGY

## 2023-07-17 PROCEDURE — 85610 PROTHROMBIN TIME: CPT | Performed by: HOSPITALIST

## 2023-07-17 PROCEDURE — 25000242 PHARM REV CODE 250 ALT 637 W/ HCPCS: Performed by: INTERNAL MEDICINE

## 2023-07-17 PROCEDURE — 99900035 HC TECH TIME PER 15 MIN (STAT)

## 2023-07-17 PROCEDURE — 25000003 PHARM REV CODE 250: Performed by: HOSPITALIST

## 2023-07-17 PROCEDURE — C9113 INJ PANTOPRAZOLE SODIUM, VIA: HCPCS | Performed by: STUDENT IN AN ORGANIZED HEALTH CARE EDUCATION/TRAINING PROGRAM

## 2023-07-17 PROCEDURE — 80321 ALCOHOLS BIOMARKERS 1OR 2: CPT | Performed by: STUDENT IN AN ORGANIZED HEALTH CARE EDUCATION/TRAINING PROGRAM

## 2023-07-17 PROCEDURE — 87106 FUNGI IDENTIFICATION YEAST: CPT | Performed by: HOSPITALIST

## 2023-07-17 PROCEDURE — 25000242 PHARM REV CODE 250 ALT 637 W/ HCPCS

## 2023-07-17 PROCEDURE — 87086 URINE CULTURE/COLONY COUNT: CPT | Performed by: HOSPITALIST

## 2023-07-17 PROCEDURE — 94761 N-INVAS EAR/PLS OXIMETRY MLT: CPT

## 2023-07-17 PROCEDURE — 51701 INSERT BLADDER CATHETER: CPT

## 2023-07-17 PROCEDURE — 27000221 HC OXYGEN, UP TO 24 HOURS

## 2023-07-17 PROCEDURE — 82330 ASSAY OF CALCIUM: CPT | Mod: 91

## 2023-07-17 PROCEDURE — 82607 VITAMIN B-12: CPT | Performed by: HOSPITALIST

## 2023-07-17 PROCEDURE — 99223 PR INITIAL HOSPITAL CARE,LEVL III: ICD-10-PCS | Mod: GC,,, | Performed by: STUDENT IN AN ORGANIZED HEALTH CARE EDUCATION/TRAINING PROGRAM

## 2023-07-17 PROCEDURE — 92526 ORAL FUNCTION THERAPY: CPT

## 2023-07-17 PROCEDURE — 99223 1ST HOSP IP/OBS HIGH 75: CPT | Mod: GC,,, | Performed by: STUDENT IN AN ORGANIZED HEALTH CARE EDUCATION/TRAINING PROGRAM

## 2023-07-17 PROCEDURE — 83735 ASSAY OF MAGNESIUM: CPT

## 2023-07-17 PROCEDURE — 51798 US URINE CAPACITY MEASURE: CPT

## 2023-07-17 PROCEDURE — 99232 SBSQ HOSP IP/OBS MODERATE 35: CPT | Mod: ,,, | Performed by: PSYCHIATRY & NEUROLOGY

## 2023-07-17 PROCEDURE — 63600175 PHARM REV CODE 636 W HCPCS: Performed by: HOSPITALIST

## 2023-07-17 PROCEDURE — 81001 URINALYSIS AUTO W/SCOPE: CPT | Performed by: HOSPITALIST

## 2023-07-17 PROCEDURE — 87088 URINE BACTERIA CULTURE: CPT | Performed by: HOSPITALIST

## 2023-07-17 PROCEDURE — 94640 AIRWAY INHALATION TREATMENT: CPT

## 2023-07-17 PROCEDURE — 99233 PR SUBSEQUENT HOSPITAL CARE,LEVL III: ICD-10-PCS | Mod: ,,, | Performed by: HOSPITALIST

## 2023-07-17 PROCEDURE — 20600001 HC STEP DOWN PRIVATE ROOM

## 2023-07-17 PROCEDURE — 99233 SBSQ HOSP IP/OBS HIGH 50: CPT | Mod: ,,, | Performed by: HOSPITALIST

## 2023-07-17 PROCEDURE — 80053 COMPREHEN METABOLIC PANEL: CPT

## 2023-07-17 PROCEDURE — 25000242 PHARM REV CODE 250 ALT 637 W/ HCPCS: Performed by: HOSPITALIST

## 2023-07-17 RX ORDER — LACTULOSE 10 G/15ML
10 SOLUTION ORAL 3 TIMES DAILY
Status: DISCONTINUED | OUTPATIENT
Start: 2023-07-17 | End: 2023-07-27

## 2023-07-17 RX ORDER — IPRATROPIUM BROMIDE AND ALBUTEROL SULFATE 2.5; .5 MG/3ML; MG/3ML
3 SOLUTION RESPIRATORY (INHALATION)
Status: DISCONTINUED | OUTPATIENT
Start: 2023-07-17 | End: 2023-08-04 | Stop reason: HOSPADM

## 2023-07-17 RX ORDER — ACETYLCYSTEINE 200 MG/ML
2 SOLUTION ORAL; RESPIRATORY (INHALATION)
Status: DISCONTINUED | OUTPATIENT
Start: 2023-07-17 | End: 2023-07-27

## 2023-07-17 RX ADMIN — IPRATROPIUM BROMIDE AND ALBUTEROL SULFATE 3 ML: 2.5; .5 SOLUTION RESPIRATORY (INHALATION) at 08:07

## 2023-07-17 RX ADMIN — LEVETIRACETAM 1000 MG: 500 SOLUTION ORAL at 09:07

## 2023-07-17 RX ADMIN — OLANZAPINE 5 MG: 5 TABLET, FILM COATED ORAL at 09:07

## 2023-07-17 RX ADMIN — HYDROXYZINE HYDROCHLORIDE 50 MG: 25 TABLET, FILM COATED ORAL at 09:07

## 2023-07-17 RX ADMIN — INSULIN ASPART 2 UNITS: 100 INJECTION, SOLUTION INTRAVENOUS; SUBCUTANEOUS at 05:07

## 2023-07-17 RX ADMIN — IPRATROPIUM BROMIDE AND ALBUTEROL SULFATE 3 ML: .5; 3 SOLUTION RESPIRATORY (INHALATION) at 06:07

## 2023-07-17 RX ADMIN — ACETYLCYSTEINE 2 ML: 200 SOLUTION ORAL; RESPIRATORY (INHALATION) at 01:07

## 2023-07-17 RX ADMIN — IPRATROPIUM BROMIDE AND ALBUTEROL SULFATE 3 ML: 2.5; .5 SOLUTION RESPIRATORY (INHALATION) at 01:07

## 2023-07-17 RX ADMIN — PANTOPRAZOLE SODIUM 40 MG: 40 INJECTION, POWDER, FOR SOLUTION INTRAVENOUS at 09:07

## 2023-07-17 RX ADMIN — AMOXICILLIN 1000 MG: 500 CAPSULE ORAL at 09:07

## 2023-07-17 RX ADMIN — ACETYLCYSTEINE 2 ML: 200 SOLUTION ORAL; RESPIRATORY (INHALATION) at 06:07

## 2023-07-17 RX ADMIN — CLARITHROMYCIN 500 MG: 500 TABLET, FILM COATED ORAL at 09:07

## 2023-07-17 RX ADMIN — CEFTRIAXONE 1 G: 1 INJECTION, POWDER, FOR SOLUTION INTRAMUSCULAR; INTRAVENOUS at 09:07

## 2023-07-17 RX ADMIN — LACTULOSE 10 G: 20 SOLUTION ORAL at 03:07

## 2023-07-17 RX ADMIN — LACTULOSE 10 G: 20 SOLUTION ORAL at 09:07

## 2023-07-17 RX ADMIN — ACETYLCYSTEINE 2 ML: 200 SOLUTION ORAL; RESPIRATORY (INHALATION) at 08:07

## 2023-07-17 NOTE — PLAN OF CARE
Problem: Infection  Goal: Absence of Infection Signs and Symptoms  Outcome: Ongoing, Not Progressing     Problem: Adult Inpatient Plan of Care  Goal: Plan of Care Review  Outcome: Ongoing, Not Progressing  Goal: Patient-Specific Goal (Individualized)  Outcome: Ongoing, Not Progressing  Goal: Absence of Hospital-Acquired Illness or Injury  Outcome: Ongoing, Not Progressing  Goal: Optimal Comfort and Wellbeing  Outcome: Ongoing, Not Progressing  Goal: Readiness for Transition of Care  Outcome: Ongoing, Not Progressing     Problem: Fluid and Electrolyte Imbalance (Acute Kidney Injury/Impairment)  Goal: Fluid and Electrolyte Balance  Outcome: Ongoing, Not Progressing     Problem: Oral Intake Inadequate (Acute Kidney Injury/Impairment)  Goal: Optimal Nutrition Intake  Outcome: Ongoing, Not Progressing     Problem: Renal Function Impairment (Acute Kidney Injury/Impairment)  Goal: Effective Renal Function  Outcome: Ongoing, Not Progressing     Problem: Impaired Wound Healing  Goal: Optimal Wound Healing  Outcome: Ongoing, Not Progressing     Problem: Fall Injury Risk  Goal: Absence of Fall and Fall-Related Injury  Outcome: Ongoing, Not Progressing     Problem: Restraint, Nonbehavioral (Nonviolent)  Goal: Absence of Harm or Injury  Outcome: Ongoing, Not Progressing     Problem: Nutrition Impairment (Mechanical Ventilation, Invasive)  Goal: Optimal Nutrition Delivery  Outcome: Ongoing, Not Progressing     Problem: Skin and Tissue Injury (Mechanical Ventilation, Invasive)  Goal: Absence of Device-Related Skin and Tissue Injury  Outcome: Ongoing, Not Progressing     Problem: Ventilator-Induced Lung Injury (Mechanical Ventilation, Invasive)  Goal: Absence of Ventilator-Induced Lung Injury  Outcome: Ongoing, Not Progressing     Problem: Communication Impairment (Artificial Airway)  Goal: Effective Communication  Outcome: Ongoing, Not Progressing     Problem: Device-Related Complication Risk (Artificial Airway)  Goal: Optimal  Device Function  Outcome: Ongoing, Not Progressing     Problem: Skin and Tissue Injury (Artificial Airway)  Goal: Absence of Device-Related Skin or Tissue Injury  Outcome: Ongoing, Not Progressing     Problem: Skin Injury Risk Increased  Goal: Skin Health and Integrity  Outcome: Ongoing, Not Progressing    Pt disoriented x3. Oriented to self. Confused and rambling. Routine meds administered via NG tube, tolerated well. Trying to pull at NG tube. Mittens in place. Safety measures maintained. Monitoring ongoing.

## 2023-07-17 NOTE — CONSULTS
[facility] - Telemetry Stepdown  Wound Care    Patient Name:  Marely Hamilton   MRN:  [id number]  Date: 7/17/2023  Diagnosis: Gastrointestinal hemorrhage associated with duodenal ulcer    History:     Past Medical History:   Diagnosis Date    Addiction to drug     Alcohol abuse     Alcohol abuse, in remission 6/15/2015    14.5 weeks ago; AA weekly    Anemia     Anxiety 6/15/2015    Behavioral problem     Bipolar disorder     Bipolar disorder in remission 6/15/2015    Cirrhosis, Laennec's 6/15/2015    Depression     Encounter for blood transfusion     Epistaxis 6/15/2015    Fatigue     Glaucoma     Hematuria     Hepatic encephalopathy 6/15/2015    Hepatic enlargement     History of psychiatric care     History of psychiatric hospitalization     History of seizure 6/15/2015    1    hx of intentional Tylenol overdose 6/15/2015    2005- situational and hx of bipolar    Hx of psychiatric care     Macrocytic anemia 9/18/2015    6 units PRBC since June 2015    Jeana     Osteoarthritis     Other ascites 6/15/2015    Psychiatric exam requested by authority     Psychiatric problem     Psychosis 9/26/2019    Renal disorder     Seizures     Self-harming behavior     Suicide attempt     Therapy     Thrombocytopenia 6/15/2015       Social History     Socioeconomic History    Marital status: Single   Occupational History    Occupation: Disabled     Employer: DISABLED   Tobacco Use    Smoking status: Never    Smokeless tobacco: Never   Substance and Sexual Activity    Alcohol use: Not Currently     Comment: hx of ETOH abuse with cirrhosis of liver    Drug use: No    Sexual activity: Not Currently   Other Topics Concern    Patient feels they ought to cut down on drinking/drug use No    Patient annoyed by others criticizing their drinking/drug use No    Patient has felt bad or guilty about drinking/drug use No    Patient has had a drink/used drugs as an eye opener in the AM No   Social History Narrative    Pt has 1 older and 1 younger  sister.  Her father committed suicide when she was 18.  She has a [employer] degree and worked as an , but she has been disabled since age 40.  She never  and has no children.  She is not dating and claims no current friends.  She currently lives with her mother along with her pet dog.  She denies any hobbies and says she is not Scientologist.     Social Determinants of Health     Financial Resource Strain: Unknown    Difficulty of Paying Living Expenses: Patient refused   Food Insecurity: Unknown    Worried About Running Out of Food in the Last Year: Patient refused    Ran Out of Food in the Last Year: Patient refused   Transportation Needs: Unknown    Lack of Transportation (Medical): Patient refused    Lack of Transportation (Non-Medical): Patient refused   Physical Activity: Unknown    Days of Exercise per Week: Patient refused    Minutes of Exercise per Session: Patient refused   Stress: Unknown    Feeling of Stress : Patient refused   Social Connections: Unknown    Frequency of Communication with Friends and Family: Patient refused    Frequency of Social Gatherings with Friends and Family: Patient refused    Attends Restorationist Services: Patient refused    Active Member of Clubs or Organizations: Patient refused    Attends Club or Organization Meetings: Patient refused    Marital Status: Patient refused   Housing Stability: Unknown    Unable to Pay for Housing in the Last Year: Patient refused    Number of Places Lived in the Last Year: 1    Unstable Housing in the Last Year: Patient refused       Precautions:     Allergies as of 07/05/2023 - Reviewed 07/05/2023   Allergen Reaction Noted    Sulfa (sulfonamide antibiotics) Rash 11/26/2014    Codeine Nausea And Vomiting 04/26/2018       [facility] Assessment Details/Treatment   Patient seen for wound care consultation.   Reviewed chart for this encounter.   See Flow Sheet for findings.    Patient seen for sacral wound. The sacrum has three partial thickness skin  losses due to incontinence and moisture. The wound bed is pink and moist. The periwound is intact. The wound was cleansed and triad applied. The patient is incontinent of stool and urine. She is unable to change positions in the bed independently. She is on an immerse mattress and is wearing heel protecting boots      RECOMMENDATIONS:  - Sacrum and buttocks: Bedside nursing to cleanse with soap and water, pat dry and apply triad BID and PRN; no diapers nor dressings   - Turning every 2 hours  - Heel protecting boots  - Immerse mattress  - Nursing to maintain pressure injury prevention interventions          07/17/23 1448        Altered Skin Integrity 06/29/23 1523 Sacral spine Moisture associated dermatitis   Date First Assessed/Time First Assessed: 06/29/23 1523   Altered Skin Integrity Present on Admission - Did Patient arrive to the hospital with altered skin?: suspected hospital acquired  Location: Sacral spine  Primary Wound Type: Moisture associated ...   Wound Image    Dressing Appearance Intact   Drainage Amount Scant   Drainage Characteristics/Odor Serosanguineous   Appearance Pink;Red;Moist   Tissue loss description Partial thickness   Periwound Area Moist;Pink   Care Cleansed with:;Soap and water   Dressing Removed;Foam;Applied;Other (comment)  (triad)       Recommendations made to primary team for above plan via secure chat. Orders placed. Wound care will follow-up as needed.     07/17/2023

## 2023-07-17 NOTE — ASSESSMENT & PLAN NOTE
with cirrhosis   Patient with thrombocytopenia   Recent Labs   Lab 07/17/23  0619 07/18/23  0525 07/19/23  0350   PLT 34* 32* 34*   . Platelet counts stable  .monitor

## 2023-07-17 NOTE — ASSESSMENT & PLAN NOTE
7/17 Saldaña removed. UA +. UC pending. started on IV ceftriaxone.  7/18 UC -YEAST 10,000 - 49,999 cfu/ml Identification pending No other significant isolate
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
## 2023-07-17 NOTE — DISCHARGE SUMMARY
[facility]   Skilled Nursing Facility   Discharge Summary  Patient Name: Marely Hamilton  : 1965  MRN: [id number]  Attending Physician: Dr. CARRINGTON Quan  Nurse Practitioner: PUSHPA PAREKH    Admit Date: 2023   Date of Discharge:  2023  Length of Stay:  LOS: 5 days     Date of Service: 23    Principal Problem: Non-convulsive status epilepticus    HPI  Marely Hamilton is a 58 y.o. female with past psychiatric history of bipolar disorder, alcohol use disorder & past pertinent medical history of seizure disorder (on AEDs), ETOH cirrhosis presents to the ED/admitted to hospital on  for Acute encephalopathy.      Frequent left hemispheric electrographic seizures and NCSE on EEG. Treated with Keppra, lactulose and rifaximin. Intubated for airway protection; extubated . Completed rocephin x 7 days for Proteus mirabilis UTI. No epileptiform discharges on EEG x 24. Acute DVTs Right brachial, femoral, and IJ. Tx argatroban-->heparin then developed large retroperitoneal hemorrhage requiring 9 units PRBCs, 11 platelet doses, 3 FFP, 2 cryoprecipitate. Now S/P IR embolization of left lumbar and internal iliac branch and S/P IVC filter placement.      Hypercalcemia treated by holding lithium. H/O severe episodes of crystal with rapid decompensation.  Previously very stable on Lithium for mood stabilization. Per psych consult : Continue lithium 150mg qhs, stop trazodone, and decrease q HS Zyprexa from 10 to 5 mg as early as . Goal lithium level 0.6-1.2mEq/L. Check lithium level  and monitor renal function. Follow-up with outpt [facility], Dr. Coates, as early as 7/3.     Patient was admitted to [facility] with PT and OT to improve functional status and ability to perform ADLs. Consults placed to RT, RD, SLP, PT/OT, Wound Care, Case Management, TelePsychiatry. Sister plans to come to [place] to assist with transition from SNF to home with sitter with long-term goal of moving  "patient to [facility] closer to sister.     Hospital Course:    Rectal bleeding noted this morning, alida blood. During pericare bleeding became profuse no stool noted. Emergency services activated for emergent ED transfer. IV attempted but unable to get access. Patient is alert and oriented x1 at baseline but became lethargic when EMS arrived. Baseline jaundice.     Profuse rectal bleed  Emergency services activated for ED transfer  D/w attending     Non-convulsive status epilepticus  Acute metabolic encephalopathy  Maintain seizure precautions  PO Keppra 1G BID  Rifaximin po 550 mg BID  Lactulose po 20 g TID - titrate for > 3 BMs q day     Acute respiratory failure with hypoxia  6/2 Intubated for airway protection r/t status epilepticus; extubated 6/16 to RA  Patient requiring 2 to 3 L NC to maintain SpO2 > 90% since SNF admit  RT consulted for oxygen monitoring, titration, and weaning of O2 per sat goal     Acute metabolic encephalopathy  Multifactorial; r/t Hepatic encephalopathy & Non-convulsive status epilepticus  Proteus mirabilis UTI, resolved in hospital s/p 7D course rocephin   Maintain Delirium precautions  See "Hepatic encephalopathy" and "Non-convulsive status epilepticus"     Hepatic encephalopathy  Chronic, unstable.  Persistent confusion - worsened from baseline per sister  Elevated ammonia on hospital admit, WNL by discharge.   Continue Lactulose and rifaximin  Titrate therapy prn to goal for BM > 3 a day     Acute deep vein thrombosis (DVT) of brachial vein of right upper extremity  Deep vein thrombosis (DVT) of femoral vein of right lower extremity  Right IJ DVT  Argatroban transitioned to heparin then stopped d/t retroperitoneal hemorrhage and thrombocytopenia.   Now S/P IVC filter placed 6/14   Maintain SCDs      Macrocytic anemia   Anemia of chronic disease           Recent H/O Acute blood loss anemia  Recent H/O Large Right retroperitoneal bleed  Recent H/O Hypovolemic shock  Received 9 units " PRBCs, 11 platelet doses, 3 FFP, 2 cryoprecipitate in short term acute  S/P IR embolization of left lumbar and internal iliac branch 6/13  Previously on epoetin  Maintain bleeding precautions  Monitor Hgb with CBC Diff q T/Fri   Transfuse PRBC for Hgb < 7.0 and / or active bleeding     Thrombocytopenia  Chronic, due to liver failure  S/p 11 platelet doses, 3 FFP, 2 cryoprecipitate in short term acute  Bleeding precautions  Monitor with routinely scheduled CBC Diff  Transfuse for platelets <15 to <20K if pt develops fever, infection, or inflammation  Prophylactically transfuse for platelets < 10K to prevent spontaneous bleeding      Acute liver failure with hepatic coma  Ascites  R/T Alcohol abuse, in remission  Furosemide 40 mg BID hold for SBP <100 or DBP <60  Spironolactone 50 mg qhs  Thiamine 100 mg po daily  Refer patient to outpatient hepatology at discharge or during SNF admit if indicated     Bipolar 1 disorder  As per Psych consult day of hospital discharge 6/30:  - Continue lithium 150mg qhs, stop trazodone  - Decreased q HS Zyprexa from 10 to 5 mg per psych recs  - Goal lithium level 0.6-1.2mEq/L.   - Check lithium level Sunday 7/2 and monitor renal function.   - Follow-up with outpt [facility], Dr. Coates, as early as 7/3.  -Telehealth psych consult placed lithium level is 0.1     Severe protein-calorie malnutrition       Body mass index is 18.02 kg/m².       Nutrition consulted.        TID nutritional supplements       MVI, Thiamine, Zinc po daily     Tremor RUE  On propanolol    At the time of discharge emergency services were activated for emergent transfer to emergency dept.     Physical Exam  Constitutional: Patient appears catechetic, disheveled, chronically ill, debilitated.  Acute distress with deteriorating LOC  Head: Normocephalic.   Eyes: Pupils are equal, round  Neck: Normal range of motion.   Cardiovascular: Normal rate, regular rhythm and normal heart sounds.    Pulmonary/Chest: Effort normal and  breath sounds are clear. 3L NC.  Abdominal: Soft. Bowel sounds are active. Rounded  GI/: Profuse alida blood from rectum  Musculoskeletal: generalized weakness  Neurological: Alert and oriented to person and place. Confused. Inattention. RUE tremor.  Psychiatric: calm and cooperative  Skin: Skin is warm and dry, jaundiced. Extensive generalized  bruising in various stages of healing.         Recent Labs   Lab 07/15/23  0350 07/16/23  0825 07/17/23  0619   WBC 3.79* 3.76* 3.49*   HGB 8.1* 8.0* 7.6*   HCT 25.5* 26.0* 26.0*   PLT 42* 41* 34*     Recent Labs   Lab 07/15/23  0350 07/16/23  0451 07/16/23  1201 07/17/23  0619   * 145  --  143   K 3.8 4.0  --  4.1   * 118*  --  118*   CO2 21* 22*  --  20*   BUN 8 7  --  8   CREATININE 0.4* 0.4*  --  0.4*   * 128*  --  142*   CALCIUM 7.9* 7.8*  --  7.9*   MG 1.9 1.6  --  1.7   PHOS 1.6* 2.3* 2.4*  --      Recent Labs   Lab 07/14/23  0752 07/15/23  0350 07/16/23  0451 07/17/23  0619   ALKPHOS  --  94 124 143*   ALT  --  10 11 11   AST  --  26 24 24   ALBUMIN  --  2.5* 2.3* 2.2*   PROT  --  4.6* 4.4* 4.5*   BILITOT  --  3.5* 3.0* 3.0*   INR 1.7* 1.8*  --  1.9*      No results for input(s): CPK, CPKMB, MB, TROPONINI in the last 72 hours.  Recent Labs     07/16/23  1822   LACTATE 1.4       Recent Labs   Lab 07/15/23  1810 07/15/23  2328 07/16/23  0451 07/16/23  1133 07/17/23  0024 07/17/23  1122   POCTGLUCOSE 137* 159* 149* 141* 172* 140*      Hemoglobin A1C   Date Value Ref Range Status   05/29/2023 <4.0 (A) 4.0 - 5.6 % Final     Comment:     ADA Screening Guidelines:  5.7-6.4%  Consistent with prediabetes  >or=6.5%  Consistent with diabetes    High levels of fetal hemoglobin interfere with the HbA1C  assay. Heterozygous hemoglobin variants (HbS, HgC, etc)do  not significantly interfere with this assay.   However, presence of multiple variants may affect accuracy.  Falsely low HA1c results may be observed in patients   with clinical conditions that shorten  erythrocyte   life span or decrease mean erythrocyte age.  HA1c   may not accurately reflect glycemic control when   clinical conditions that affect erythrocyte survival   are present. Fructosamine may be used as an alternate  measurement of glycemic control.     01/22/2021 4.1 4.0 - 5.6 % Final     Comment:     ADA Screening Guidelines:  5.7-6.4%  Consistent with prediabetes  >or=6.5%  Consistent with diabetes  High levels of fetal hemoglobin interfere with the HbA1C  assay. Heterozygous hemoglobin variants (HbS, HgC, etc)do  not significantly interfere with this assay.   However, presence of multiple variants may affect accuracy.     12/10/2020 4.6 4.0 - 5.6 % Final     Comment:     ADA Screening Guidelines:  5.7-6.4%  Consistent with prediabetes  >or=6.5%  Consistent with diabetes  High levels of fetal hemoglobin interfere with the HbA1C  assay. Heterozygous hemoglobin variants (HbS, HgC, etc)do  not significantly interfere with this assay.   However, presence of multiple variants may affect accuracy.           Discharge Medication List as of 7/6/2023 11:00 AM        CONTINUE these medications which have NOT CHANGED    Details   folic acid (FOLVITE) 1 MG tablet Take 1 tablet (1 mg total) by mouth once daily., Starting Mon 2/13/2023, Until Tue 2/13/2024, Normal      lithium carbonate 150 MG capsule Take 1 capsule (150 mg total) by mouth every evening., Starting Fri 6/30/2023, Until Sat 6/29/2024, Normal      propranoloL (INDERAL) 40 MG tablet Take 20 mg by mouth once daily., Starting Sat 12/3/2022, Historical Med      spironolactone (ALDACTONE) 50 MG tablet Take 1 tablet (50 mg total) by mouth once daily., Starting Sat 7/1/2023, Until Sun 6/30/2024, Normal      levETIRAcetam (KEPPRA) 1000 MG tablet Take 1,000 mg by mouth 2 (two) times daily., Historical Med      OLANZapine (ZYPREXA) 10 MG tablet Take 1 tablet (10 mg total) by mouth every evening., Starting Fri 6/30/2023, Until Sat 6/29/2024, Normal      rifAXIMin  (XIFAXAN) 550 mg Tab Take 1 tablet (550 mg total) by mouth 2 (two) times daily., Starting Fri 6/30/2023, Normal      bacitracin zinc 500 unit/gram OiPk Apply topically 3 (three) times daily. for 14 days, Starting Fri 6/30/2023, Until Fri 7/14/2023, No Print      furosemide (LASIX) 40 MG tablet Take 1 tablet (40 mg total) by mouth 2 (two) times daily., Starting Fri 6/30/2023, Until Sat 6/29/2024, Normal      hydrOXYzine (ATARAX) 50 MG tablet Take 1 tablet (50 mg total) by mouth nightly as needed for Itching or Anxiety (or insomnia)., Starting Fri 6/30/2023, No Print      lactulose (CHRONULAC) 20 gram/30 mL Soln 30 mLs (20 g total) by Per NG tube route 3 (three) times daily. Always give first dose in AM. Hold PM dose(s) if has had 3 BMs by them, Starting Fri 6/30/2023, No Print             Discharge Diet: NPO      Activity: bedrest    Discharge Condition: Serious     Disposition:  Emergency department    No future appointments.    45 minutes total time spent on the discharge of the patient including review of hospital course with the patient, reviewing discharge medications, and arranging follow-up care.      PUSHPA PAREKH  [facility]   Skilled Nursing [facility]

## 2023-07-17 NOTE — ASSESSMENT & PLAN NOTE
ammonia 190s on admit -->90s . AAOX 1. no lactulose give due to GI bleed  repeat ammonia. Hepatolog eval  with     MELD 3.0: 21 at 7/17/2023  6:19 AM  MELD-Na: 18 at 7/17/2023  6:19 AM  Calculated from:  Serum Creatinine: 0.4 mg/dL (Using min of 1 mg/dL) at 7/17/2023  6:19 AM  Serum Sodium: 143 mmol/L (Using max of 137 mmol/L) at 7/17/2023  6:19 AM  Total Bilirubin: 3.0 mg/dL at 7/17/2023  6:19 AM  Serum Albumin: 2.2 g/dL at 7/17/2023  6:19 AM  INR(ratio): 1.9 at 7/17/2023  6:19 AM  Age at listing (hypothetical): 58 years  Sex: Female at 7/17/2023  6:19 AM  7/16 Ammonia 48 WNL. AAOX 1. Hepatology consulted for cirrhosis.    7/17  Hepatology eval. resumed lactulose.  7/18  hydroxyzine  PRN  discontinued  due to it not being safe in delirium

## 2023-07-17 NOTE — PT/OT/SLP PROGRESS
Speech Language Pathology Treatment    Patient Name:  Marely Hamilton   MRN:  [id number]  Admitting Diagnosis: Gastrointestinal hemorrhage associated with duodenal ulcer    Recommendations:               General Recommendations:  Dysphagia therapy and Modified Barium Swallow Study  Diet recommendations:  NPO, NPO   Aspiration Precautions:   Pleasure feeds of ice chips and/or tsp bites of pudding/applesauce for swallow stimulation   Meds crushed in applesauce/pudding  Ice chips sparingly throughout the day for pleasure   General Precautions: Standard, aspiration, fall, NPO  Communication strategies:  provide increased time to answer     Assessment:     Marely Hamilton is a 58 y.o. female with an SLP diagnosis of Dysphagia and Dysphonia.  She presents with improvement in tolerance of PO trials though ongoing overt signs of poor tolerance with oral intake. SLP to continue to follow.     Subjective     SLP spoke with RN prior to session. Pt found resting in bed upon SLP entry into room. Pt agreeable to participate in all aspects of session.      Patient goals: to eat ice     Pain/Comfort:  Pain Rating 1: 0/10    Respiratory Status: Nasal cannula, flow 2 L/min    Objective:     Has the patient been evaluated by SLP for swallowing?   Yes  Keep patient NPO? Yes   Current Respiratory Status:        Pt seen for ongoing dysphagia therapy. Pt remained confused throughout session with language of confusion though clear vocal quality. PO trials of thin liquids via cup sip and puree trials via tsp provided with multiple swallows exhibited across all trials. Slightly increased number of swallows exhibited with puree trials. Difficult to determine change in vocal quality as pt with poor ability to consistently phonate upon command; however, no consistent wet vocal quality noted following the swallow during periods of spontaneous speech. SLP provided education regarding overall impressions, ongoing NPO status, MBSS, overt s/s of  aspiration, and SLP POC. Pt verbalized understanding but would continue to benefit from ongoing education. Whiteboard updated. Pt left with RN at bedside upon SLP exit.     Goals:   Multidisciplinary Problems       SLP Goals          Problem: SLP    Goal Priority Disciplines Outcome   SLP Goal     SLP Ongoing, Progressing   Description: Speech Pathology Goals  To be met by 7/27/23    1. Pt will participate in ongoing diagnostic dysphagia therapy                              Plan:     Patient to be seen:  4 x/week   Plan of Care expires:  08/12/23  Plan of Care reviewed with:  patient   SLP Follow-Up:  Yes       Discharge recommendations:   (TBD)   Barriers to Discharge:  Level of Skilled Assistance Needed      Time Tracking:     SLP Treatment Date:   07/17/23  Speech Start Time:  1458  Speech Stop Time:  1510     Speech Total Time (min):  12 min    Billable Minutes: Treatment Swallowing Dysfunction 12      07/17/2023

## 2023-07-17 NOTE — ASSESSMENT & PLAN NOTE
patient with INR   Recent Labs   Lab 07/17/23  0619 07/18/23  0525 07/19/23  0350   INR 1.9* 1.7* 1.6*   . INR is supratherapeutic. secondary to coagulopathy from liver disease.monitor

## 2023-07-17 NOTE — CONSULTS
[facility]  Hepatology  Consult Note    Patient Name: Marely Hamilton  MRN: [id number]  Admission Date: 7/5/2023  Hospital Length of Stay: 11 days  Code Status: DNR   Attending Provider: Seth Noyola MD   Consulting Provider: Amari Santo DO  Primary Care Physician: Viktor Ross MD  Principal Problem:Gastrointestinal hemorrhage associated with duodenal ulcer    Inpatient consult to Hepatology  Consult performed by: Amari Santo DO  Consult ordered by: Seth Noyola MD      Subjective:     HPI: Marely Hamilton is a 58 y.o. female with EtOH cirrhosis, seizure disorder on AED's, Bipolar Disorder, DVT and IJ thrombus with recent admission for retroperitoneal hemorrhage requiring IR embolization and IVC filter placement [place] [facility]. Was admitted to the MICU for hemorrhagic shock requiring intubation and pressor support. EGD on 7/06 with oozing duodenal ulcers s/p clip placement. Due to ongoing need for blood products, was empirically embolized with IR on 7/7 near location of clip despite no active bleeding seen on CTA. Extubated on 7/11. Stepped down to the floor on 7/16. Hepatology consulted given the patient has cirrhosis. Ammonia level 190 on 7/5. Repeat on 7/9 at 81. Repeat on 7/16 at 48.     Past Medical History:   Diagnosis Date    Addiction to drug     Alcohol abuse     Alcohol abuse, in remission 6/15/2015    14.5 weeks ago; AA weekly    Anemia     Anxiety 6/15/2015    Behavioral problem     Bipolar disorder     Bipolar disorder in remission 6/15/2015    Cirrhosis, Laennec's 6/15/2015    Depression     Encounter for blood transfusion     Epistaxis 6/15/2015    Fatigue     Glaucoma     Hematuria     Hepatic encephalopathy 6/15/2015    Hepatic enlargement     History of psychiatric care     History of psychiatric hospitalization     History of seizure 6/15/2015    1    hx of intentional Tylenol overdose 6/15/2015    2005- situational and hx of  bipolar    Hx of psychiatric care     Macrocytic anemia 9/18/2015    6 units PRBC since June 2015    [facility]     Osteoarthritis     Other ascites 6/15/2015    Psychiatric exam requested by authority     Psychiatric problem     Psychosis 9/26/2019    Renal disorder     Seizures     Self-harming behavior     Suicide attempt     Therapy     Thrombocytopenia 6/15/2015       Past Surgical History:   Procedure Laterality Date    COSMETIC SURGERY      EMBOLIZATION N/A 6/11/2023    Procedure: EMBOLIZATION, BLOOD VESSEL;  Surgeon: Debbie Surgeon;  Location: [facility];  Service: Anesthesiology;  Laterality: N/A;    ESOPHAGOGASTRODUODENOSCOPY      ESOPHAGOGASTRODUODENOSCOPY N/A 7/6/2023    Procedure: EGD (ESOPHAGOGASTRODUODENOSCOPY);  Surgeon: Bernice Guillen MD;  [facility]  Service: Endoscopy;  Laterality: N/A;    ESOPHAGOGASTRODUODENOSCOPY N/A 7/11/2023    Procedure: EGD (ESOPHAGOGASTRODUODENOSCOPY);  Surgeon: Rangel Broussard MD;  [facility]  Service: Endoscopy;  Laterality: N/A;       Family History   Problem Relation Age of Onset    Alcohol abuse Father     Suicide Father     Bipolar disorder Father     Alcohol abuse Sister     Hypertension Mother     Alcohol abuse Paternal Grandfather     Drug abuse Neg Hx     Dementia Neg Hx        Social History     Socioeconomic History    Marital status: Single   Occupational History    Occupation: Disabled     Employer: DISABLED   Tobacco Use    Smoking status: Never    Smokeless tobacco: Never   Substance and Sexual Activity    Alcohol use: Not Currently     Comment: hx of ETOH abuse with cirrhosis of liver    Drug use: No    Sexual activity: Not Currently   Other Topics Concern    Patient feels they ought to cut down on drinking/drug use No    Patient annoyed by others criticizing their drinking/drug use No    Patient has felt bad or guilty about drinking/drug use No    Patient has had a drink/used drugs as an eye opener in the AM No   Social  History Narrative    Pt has 1 older and 1 younger sister.  Her father committed suicide when she was 18.  She has a [employer] degree and worked as an , but she has been disabled since age 40.  She never  and has no children.  She is not dating and claims no current friends.  She currently lives with her mother along with her pet dog.  She denies any hobbies and says she is not [employer].     Social Determinants of Health     Financial Resource Strain: Unknown    Difficulty of Paying Living Expenses: Patient refused   Food Insecurity: Unknown    Worried About Running Out of Food in the Last Year: Patient refused    Ran Out of Food in the Last Year: Patient refused   Transportation Needs: Unknown    Lack of Transportation (Medical): Patient refused    Lack of Transportation (Non-Medical): Patient refused   Physical Activity: Unknown    Days of Exercise per Week: Patient refused    Minutes of Exercise per Session: Patient refused   Stress: Unknown    Feeling of Stress : Patient refused   Social Connections: Unknown    Frequency of Communication with Friends and Family: Patient refused    Frequency of Social Gatherings with Friends and Family: Patient refused    Attends Shinto Services: Patient refused    Active Member of Clubs or Organizations: Patient refused    Attends Club or Organization Meetings: Patient refused    Marital Status: Patient refused   Housing Stability: Unknown    Unable to Pay for Housing in the Last Year: Patient refused    Number of Places Lived in the Last Year: 1    Unstable Housing in the Last Year: Patient refused       No current facility-administered medications on file prior to encounter.     Current Outpatient Medications on File Prior to Encounter   Medication Sig Dispense Refill    ARIPiprazole (ABILIFY) 20 MG Tab Take 5 mg by mouth once daily.      folic acid (FOLVITE) 1 MG tablet Take 1 tablet (1 mg total) by mouth once daily. (Patient taking differently: Take 1 mg by mouth  every evening.) 30 tablet 2    furosemide (LASIX) 20 MG tablet Take 20 mg by mouth once daily.      lactulose (CHRONULAC) 10 gram/15 mL solution Take 10 g by mouth 3 (three) times daily.      lithium carbonate 150 MG capsule Take 1 capsule (150 mg total) by mouth every evening. (Patient taking differently: Take 150 mg by mouth 2 (two) times a day.) 30 capsule 11    magnesium oxide (MAG-OX) 400 mg (241.3 mg magnesium) tablet Take by mouth once daily.      pantoprazole (PROTONIX) 40 MG tablet Take 40 mg by mouth once daily.      propranoloL (INDERAL) 40 MG tablet Take 40 mg by mouth once daily.      QUEtiapine (SEROQUEL) 300 MG Tab Take 100 mg by mouth every evening.      sodium bicarbonate 650 MG tablet Take 650 mg by mouth Daily.      spironolactone (ALDACTONE) 50 MG tablet Take 1 tablet (50 mg total) by mouth once daily. 90 tablet 3    zonisamide (ZONEGRAN) 100 MG Cap Take 100 mg by mouth once daily.      levETIRAcetam (KEPPRA) 1000 MG tablet Take 1,000 mg by mouth 2 (two) times daily.      OLANZapine (ZYPREXA) 10 MG tablet Take 1 tablet (10 mg total) by mouth every evening. (Patient not taking: Reported on 7/6/2023) 30 tablet 11    rifAXIMin (XIFAXAN) 550 mg Tab Take 1 tablet (550 mg total) by mouth 2 (two) times daily. (Patient not taking: Reported on 7/6/2023) 180 tablet 3       Review of patient's allergies indicates:   Allergen Reactions    Sulfa (sulfonamide antibiotics) Rash    Codeine Nausea And Vomiting       Review of Systems   Unable to perform ROS: Acuity of condition      Objective:     Vitals:    07/16/23 1953   BP: (!) 124/59   Pulse: 92   Resp: 16   Temp: 99 °F (37.2 °C)     Constitutional:  not in acute distress and ill appearing, but non-toxic  HENT: Head: Normal, normocephalic, atraumatic.  Eyes: conjunctiva clear and sclera nonicteric  Cardiovascular: regular rate and rhythm  Respiratory: normal chest expansion & respiratory effort   and no accessory muscle use  GI: soft, non-tender, without  masses or organomegaly  Musculoskeletal: no muscular tenderness note  Neurological: alert, oriented x3    Significant Labs:  Recent Labs   Lab 07/14/23  0752 07/15/23  0350 07/16/23  0825   HGB 8.6* 8.1* 8.0*       Lab Results   Component Value Date    WBC 3.76 (L) 07/16/2023    HGB 8.0 (L) 07/16/2023    HCT 26.0 (L) 07/16/2023     (H) 07/16/2023    PLT 41 (L) 07/16/2023       Lab Results   Component Value Date     07/16/2023    K 4.0 07/16/2023     (H) 07/16/2023    CO2 22 (L) 07/16/2023    BUN 7 07/16/2023    CREATININE 0.4 (L) 07/16/2023    CALCIUM 7.8 (L) 07/16/2023    ANIONGAP 5 (L) 07/16/2023    ESTGFRAFRICA SEE COMMENT 06/03/2023    EGFRNONAA SEE COMMENT 06/03/2023       Lab Results   Component Value Date    ALT 11 07/16/2023    AST 24 07/16/2023    GGT 33 06/17/2023    ALKPHOS 124 07/16/2023    BILITOT 3.0 (H) 07/16/2023       Lab Results   Component Value Date    INR 1.8 (H) 07/15/2023    INR 1.7 (H) 07/14/2023    INR 1.7 (H) 07/13/2023       Significant Imaging:  Reviewed pertinent radiology findings.       Assessment/Plan:     Marely Hamilton is a 58 y.o. female with EtOH cirrhosis, seizure disorder on AED's, Bipolar Disorder, DVT and IJ thrombus with recent admission for retroperitoneal hemorrhage requiring IR embolization and IVC filter placement who [place] [facility] [place]ochezia. Hepatology consulted given the patient has cirrhosis with AMS.     Problem List:  EtOH cirrhosis   Acute Metabolic Encephalopathy/? Hepatic Encephalopathy   Acute Cystitis   Delirium    Recommendations:    - Would resume home lactulose   - Follow up PETH level  - Continue supportive care and treatment of acute cystitis  - Consider alternative etiologies of metabolic encephalopathy as patient does not seem to present with typical features of hepatic encephalopathy- she is able to answer orientation questions to herself, place and time   - No need to trend serum NH4 level   - Delirium precautions      Thank you for involving us in the care of Marely Hamilton. Please call with any additional questions, concerns or changes in the patient's clinical status. We will continue to follow.    Amari Santo DO  Gastroenterology Fellow PGY IV  [facility]

## 2023-07-17 NOTE — PROGRESS NOTES
Jimmy Phillip - Telemetry [facility] Medicine  Progress Note    Patient Name: Marely Hamilton  MRN: [id number]  Patient Class: IP- Inpatient   Admission Date: 7/5/2023  Length of Stay: 12 days  Attending Physician: Seth Noyola MD  Primary Care Provider: Viktor Ross MD        Subjective:     Principal Problem:Gastrointestinal hemorrhage associated with duodenal ulcer        HPI:  Marely Hamilton is a 58 y.o. female with history of alcoholic cirrhosis, seizure disorder, DVT and IJ thrombus with recent admission for large retroperitoneal hemorrhage requiring IR embolization and IVC filter placement who presents for hematochezia. Onset this morning at [facility], alida blood per rectum per chart, sent to ED. Initially normotensive but then severe hypotension prompted initiation of levophed and intubation. Hgb 6.8, 2u pRBC given + 1u FFP ordered. Hgb 8.5 on 7/2. On levo and vaso currently. K 6.6 but renal function at baseline. Repeat pending. No prior EGDs on record.          Overview/Hospital Course:    Patient was seen at bedside, hematochezia still present post op by IR. Patient has required many transfusions of pRBCs and platelets due to ongoing down trending of Hgb. No clear source of ongoing hemorrhage by imaging. Patient has continued to require pressors to maintain MAP >65. Discussed with GI and IR regarding active bleeding post op, IR indicated there is not much else to do from their end bc they embolized a significant part of GDA. Pt is off of pressor requirement and not actively bleeding. GI re evaluated pt via EGD and found no sources of active bleeding. Pt is extubated and currently on BIPAP requirement due to de saturation in the 80's. Pt is to be seen by speech and PT/OT. Clear mucinous secretions via suction, chest physiotherapy, and cough assist device. Nebs to help with breathing as well. Pt is off BIPAP and currently on comfort flow. Triple therapy (amoxicillin, clarithromycin) started for 14 days  to treat for positive H pylori serology. Pt also had a NG tube placed so we may begin tube feedings. CxR, EKG, and ABG displayed no reason for tachycardia. Pt becomes anxious when seen by different provider teams. Remove art line and consult for midline placement. Continue to wean comfort flow as tolerated. Ensure pt is working with PT/OT/Speech for continuous improvement. Consult psych regarding patient history of bipolar disorder.         Interval History/Significant Events: Wean comfort flow as tolerated. Ensure patient is working with PT/OT/Speech for continuous improvement. Consult psych regarding psych history     7/16   history of alcoholic cirrhosis, seizure disorder, DVT and IJ thrombus with recent admission for large retroperitoneal hemorrhage requiring IR embolization and IVC filter placement who presents for hematochezia. s/p GI and IR in which they clipped (GI) and embolized (IR) possible sources of duodenal ulcer bleeding.  Hb stable  s/p extubation. sats 92% on 5L of NC.  CX ray - Detrimental changes since the previous examination including interval increase in pulmonary vascularity and bilateral diffuse interstitial pulmonary parenchymal opacification, progression of abnormal opacification at the left lung base, and development of small right-sided pleural effusion.   findings would be consistent with congestive heart failure.PT/OT recs SNF. BNP , procalcitonin and Echo.  SLP recs NPO .  On NGT feeds. psychiatry following for bipolar disorders.  Recs Zyprexa 5 mg QHS . Ammonia 48 WNL. AAOX 1. Hepatology consulted for cirrhosis. UA ordered   7/17 Saldaña removed. UA +. UC pending. started on IV ceftriaxone. ammonia at 59. on B/L UE mittens as pulled of NGT. Hepatology eval. resumed lactulose . discussed with sister and updated clinical status         Review of Systems:   Pain scale:   Constitutional:  fever,  chills, headache, vision loss, hearing loss, weight loss, Generalized weakness, falls, loss of  smell, loss of taste, poor appetite,  sore throat  Respiratory: cough, shortness of breath.   Cardiovascular: chest pain, dizziness, palpitations, orthopnea, swelling of feet, syncope  Gastrointestinal: nausea, vomiting, abdominal pain, diarrhea, black stool,  blood in stool, change in bowel habits  Genitourinary: hematuria, dysuria, urgency, frequency  Integument/Breast: rash,  pruritis  Hematologic/Lymphatic: easy bruising, lymphadenopathy  Musculoskeletal: arthralgias , myalgias, back pain, neck pain, knee pain  Neurological: confusion, seizures, tremors, slurred speech  Behavioral/Psych:  depression, anxiety, auditory or visual hallucinations     OBJECTIVE:     Physical Exam:  Body mass index is 24.72 kg/m².    Constitutional: Appears well-developed and well-nourished.   Head: Normocephalic and atraumatic. + icterus  Neck: Normal range of motion. Neck supple. NGT in place   Cardiovascular: Normal heart rate.  Regular heart rhythm.  Pulmonary/Chest: Effort normal.   Abdominal: No distension.  No tenderness. foleys and rectal tube in place   Musculoskeletal: Normal range of motion. ++ edema bilateral UE and LE. UE mittens  Neurological: Alert and oriented to self  follows simple commands   Skin: Skin is warm and dry. ehcymoses on the upper chest    Psychiatric: Normal mood and affect. Behavior is normal.                  Vital Signs  Temp: 100.2 °F (37.9 °C) (07/17/23 1124)  Pulse: 103 (07/17/23 1317)  Resp: 18 (07/17/23 1317)  BP: (Abnormal) 106/56 (07/17/23 1124)  SpO2: 95 % (07/17/23 1317)     24 Hour VS Range    Temp:  [97.5 °F (36.4 °C)-100.2 °F (37.9 °C)]   Pulse:  []   Resp:  [16-20]   BP: (106-166)/(55-72)   SpO2:  [92 %-96 %]     Intake/Output Summary (Last 24 hours) at 7/17/2023 1353  Last data filed at 7/17/2023 0630  Gross per 24 hour   Intake 1600 ml   Output 694 ml   Net 906 ml         I/O This Shift:  No intake/output data recorded.    Wt Readings from Last 3 Encounters:   07/17/23 61.3 kg  (135 lb 2.3 oz)   07/03/23 44.7 kg (98 lb 8.7 oz)   06/29/23 59.9 kg (132 lb 0.9 oz)       I have personally reviewed the vitals and recorded Intake/Output     Laboratory/Diagnostic Data:    CBC/Anemia Labs: Coags:    Recent Labs   Lab 07/15/23  0350 07/16/23  0825 07/17/23  0619   WBC 3.79* 3.76* 3.49*   HGB 8.1* 8.0* 7.6*   HCT 25.5* 26.0* 26.0*   PLT 42* 41* 34*   * 102* 107*   RDW 24.9* 24.7* 25.2*   FOLATE  --   --  11.5   [id number]  --   --  947    Recent Labs   Lab 07/14/23  0752 07/15/23  0350 07/16/23  0451 07/17/23  0619   INR 1.7* 1.8*  --  1.9*   APTT  --  28.2 33.4* 28.2        Chemistries: ABG:   Recent Labs   Lab 07/15/23  0350 07/16/23  0451 07/16/23  1201 07/17/23  0619   * 145  --  143   K 3.8 4.0  --  4.1   * 118*  --  118*   CO2 21* 22*  --  20*   BUN 8 7  --  8   CREATININE 0.4* 0.4*  --  0.4*   CALCIUM 7.9* 7.8*  --  7.9*   PROT 4.6* 4.4*  --  4.5*   BILITOT 3.5* 3.0*  --  3.0*   ALKPHOS 94 124  --  143*   ALT 10 11  --  11   AST 26 24  --  24   MG 1.9 1.6  --  1.7   PHOS 1.6* 2.3* 2.4*  --     Recent Labs   Lab 07/13/23  1044   PH 7.429   PCO2 34.5*   PO2 150*   HCO3 22.9*   POCSATURATED 99   BE -1        POCT Glucose: HbA1c:    Recent Labs   Lab 07/15/23  1810 07/15/23  2328 07/16/23  0451 07/16/23  1133 07/17/23  0024 07/17/23  1122   POCTGLUCOSE 137* 159* 149* 141* 172* 140*    Hemoglobin A1C   Date Value Ref Range Status   05/29/2023 <4.0 (A) 4.0 - 5.6 % Final     Comment:     ADA Screening Guidelines:  5.7-6.4%  Consistent with prediabetes  >or=6.5%  Consistent with diabetes    High levels of fetal hemoglobin interfere with the HbA1C  assay. Heterozygous hemoglobin variants (HbS, HgC, etc)do  not significantly interfere with this assay.   However, presence of multiple variants may affect accuracy.  Falsely low HA1c results may be observed in patients   with clinical conditions that shorten erythrocyte   life span or decrease mean erythrocyte age.  HA1c   may not  accurately reflect glycemic control when   clinical conditions that affect erythrocyte survival   are present. Fructosamine may be used as an alternate  measurement of glycemic control.     01/22/2021 4.1 4.0 - 5.6 % Final     Comment:     ADA Screening Guidelines:  5.7-6.4%  Consistent with prediabetes  >or=6.5%  Consistent with diabetes  High levels of fetal hemoglobin interfere with the HbA1C  assay. Heterozygous hemoglobin variants (HbS, HgC, etc)do  not significantly interfere with this assay.   However, presence of multiple variants may affect accuracy.     12/10/2020 4.6 4.0 - 5.6 % Final     Comment:     ADA Screening Guidelines:  5.7-6.4%  Consistent with prediabetes  >or=6.5%  Consistent with diabetes  High levels of fetal hemoglobin interfere with the HbA1C  assay. Heterozygous hemoglobin variants (HbS, HgC, etc)do  not significantly interfere with this assay.   However, presence of multiple variants may affect accuracy.          Cardiac Enzymes: Ejection Fractions:    No results for input(s): CPK, CPKMB, MB, TROPONINI in the last 72 hours. EF   Date Value Ref Range Status   07/16/2023 65 % Final   06/03/2023 70 % Final          Recent Labs   Lab 07/17/23  0111   COLORU Yellow   APPEARANCEUA Hazy*   PHUR 8.0   SPECGRAV 1.010   PROTEINUA Trace*   GLUCUA Negative   KETONESU Negative   BILIRUBINUA Negative   OCCULTUA Trace*   NITRITE Negative   LEUKOCYTESUR 3+*   RBCUA 7*   WBCUA 73*   BACTERIA Occasional   SQUAMEPITHEL 3       Procalcitonin (ng/mL)   Date Value   06/16/2023 0.26 (H)     Lactate (Lactic Acid) (mmol/L)   Date Value   07/16/2023 1.4   07/16/2023 1.4   07/06/2023 1.5   07/05/2023 8.0 (HH)   06/11/2023 6.2 (HH)     BNP (pg/mL)   Date Value   07/16/2023 174 (H)     No results found for: CRP, SEDRATE  D-Dimer (mg/L FEU)   Date Value   07/07/2023 2.22 (H)     Ferritin (ng/mL)   Date Value   05/18/2023 110   10/23/2015 412 (H)   09/19/2015 599 (H)   09/19/2015 599 (H)   07/23/2015 048 (H)    06/17/2015 612 (H)     LD (U/L)   Date Value   06/05/2023 249   05/31/2023 426 (H)   09/19/2015 280 (H)     Troponin I (ng/mL)   Date Value   06/13/2023 0.029 (H)   06/13/2023 0.030 (H)   05/18/2023 0.035 (H)   05/18/2023 0.037 (H)   05/05/2023 <0.006   01/25/2023 <0.006     CPK (U/L)   Date Value   05/05/2023 47   01/25/2023 27   06/14/2020 23 (L)     CK (U/L)   Date Value   04/15/2023 98     Results for orders placed or performed during the hospital encounter of 05/18/23   Vitamin D   Result Value Ref Range    Vit D, 25-Hydroxy 15 (L) 30 - 96 ng/mL     SARS-CoV2 (COVID-19) Qualitative PCR (no units)   Date Value   06/30/2023 Not Detected   02/14/2023 Not Detected     SARS-CoV-2 RNA, Amplification, Qual (no units)   Date Value   05/28/2023 Negative   11/02/2021 Negative   01/21/2021 Negative   01/11/2021 Negative   11/21/2020 Negative   06/16/2020 Negative       Microbiology labs for the last week  Microbiology Results (last 7 days)       Procedure Component Value Units Date/Time    Urine culture [[id number]] Collected: 07/17/23 0111    Order Status: No result Specimen: Urine Updated: 07/17/23 0137            Reviewed and noted in plan where applicable- Please see chart for full lab data.    Lines/Drains:       Peripheral IV - Single Lumen 07/12/23 1148 20 G;1 3/4 in Left Forearm (Active)   Site Assessment Clean;Dry;Intact 07/16/23 0701   Extremity Assessment Distal to IV No abnormal discoloration 07/16/23 0701   Line Status No blood return;Flushed;Saline locked 07/16/23 0701   Dressing Status Dry;Clean;Intact 07/16/23 0701   Dressing Intervention Integrity maintained 07/16/23 0701   Dressing Change Due 07/16/23 07/16/23 0701   Site Change Due 07/16/23 07/16/23 0701   Reason Not Rotated Not due 07/16/23 0701   Number of days: 3            Peripheral IV - Single Lumen 07/14/23 1540 20 G;1 3/4 in Right Upper Arm (Active)   Site Assessment Intact;Clean;Dry 07/16/23 0701   Extremity Assessment Distal to IV No abnormal  "discoloration 07/16/23 0701   Line Status Blood return noted;Flushed;Saline locked 07/16/23 0701   Dressing Status Dry;Intact;Clean 07/16/23 0701   Dressing Intervention Integrity maintained 07/16/23 0701   Dressing Change Due 07/18/23 07/16/23 0701   Site Change Due 07/18/23 07/16/23 0701   Reason Not Rotated Not due 07/16/23 0701   Number of days: 1            NG/OG Tube 07/13/23 1311 [place] sump 18 Fr. Right nostril (Active)   Placement Check placement verified by aspirate characteristics 07/16/23 0701   Tolerance no signs/symptoms of discomfort 07/16/23 0701   Securement secured to nostril center w/ adhesive device 07/16/23 0701   Clamp Status/Tolerance unclamped 07/16/23 0701   Suction Setting/Drainage Method suction at the bedside 07/16/23 0701   Insertion Site Appearance no redness, warmth, tenderness, skin breakdown, drainage 07/16/23 0701   Flush/Irrigation flushed w/;water 07/16/23 0502   Feeding Type continuous;by pump 07/16/23 0701   Feeding Action feeding continued 07/16/23 0701   Current Rate (mL/hr) 50 mL/hr 07/16/23 0701   Goal Rate (mL/hr) 50 mL/hr 07/16/23 0701   Intake (mL) 20 mL 07/15/23 0800   Water Bolus (mL) 250 mL 07/16/23 0501   Rate Formula Tube Feeding (mL/hr) 20 mL/hr 07/14/23 0400   Formula Name Isosource 1.5 07/16/23 0701   Intake (mL) - Formula Tube Feeding 50 07/16/23 0700   Residual Amount (ml) 30 ml 07/15/23 2337   Number of days: 2            Urethral Catheter 07/05/23 1332 Double-lumen (Active)   Site Assessment Clean;Intact 07/16/23 0701   Collection Container Urimeter 07/16/23 0701   Securement Method secured to top of thigh w/ adhesive device 07/16/23 0701   Catheter Care Performed yes 07/16/23 0701   Reason for Continuing Urinary Catheterization Non-healing sacral/perineal wound 07/16/23 0701   CAUTI Prevention Bundle Securement Device in place with 1" slack;Intact seal between catheter & drainage tubing;Drainage bag/urimeter off the floor;Sheeting clip in use;No dependent " loops or kinks;Drainage bag/urimeter not overfilled (<2/3 full);Drainage bag/urimeter below bladder 07/16/23 0701   Output (mL) 75 mL 07/16/23 0738   Number of days: 10            Fecal Incontinence  07/09/23 2300 (Active)   $ Fecal Management Supplies Fecal Management System (Supply) 07/14/23 1954   Application fecal incontinence  in place 07/16/23 0701   Drainage Method attached to drainage bag 07/16/23 0701   Securement to gravity 07/16/23 0701   Skin cleansed, skin barrier applied 07/16/23 0701   Tolerance no signs/symptoms of discomfort 07/16/23 0701   Stool (mL) 200 mL 07/16/23 0501   Number of days: 6       Imaging  ECG Results              EKG 12-lead (Final result)  Result time 07/05/23 13:56:05      Final result by Interface, Lab In [facility] (07/05/23 13:56:05)               Narrative:    Test Reason : E87.5,    Vent. Rate : 103 BPM     Atrial Rate : 103 BPM     P-R Int : 132 ms          QRS Dur : 076 ms      QT Int : 342 ms       P-R-T Axes : 073 054 084 degrees     QTc Int : 448 ms    Age and gender specific analysis  Sinus tachycardia  Low voltage QRS  Low anterior forces and R wave progression  Possibly acute STEMI    ACUTE MI / STEMI    Abnormal ECG  When compared with ECG of 05-JUL-2023 11:07,  No significant change was found  Confirmed by Abimael CLEMENTS MD ([id number]) on 7/5/2023 1:55:55 PM    Referred By: [email or web address]   SELF           Confirmed By:Abimael CLEMENTS MD                                   EKG 12-lead (Final result)  Result time 07/05/23 14:01:06      Final result by Interface, Lab In [facility] (07/05/23 14:01:06)               Narrative:    Test Reason : K92.2,    Vent. Rate : 096 BPM     Atrial Rate : 096 BPM     P-R Int : 114 ms          QRS Dur : 066 ms      QT Int : 352 ms       P-R-T Axes : 078 090 065 degrees     QTc Int : 444 ms    Normal sinus rhythm  Vertical axis  Otherwise normal ECG  When compared with ECG of 27-JUN-2023 12:05,  T wave inversion no longer evident in Anterior  leads  Confirmed by Cameron Hodge MD ([id number]) on 7/5/2023 2:01:00 PM    Referred By: System System           Confirmed By:Cameron Hodge MD                                  Results for orders placed during the hospital encounter of 05/28/23    Echo    Interpretation Summary  · The left ventricle is normal in size with hyperdynamic systolic function. The estimated ejection fraction is 70%.  · Normal right ventricular size with normal right ventricular systolic function.  · Normal left ventricular diastolic function.  · Mild left atrial enlargement.  · Mechanically ventilated; cannot use inferior caval vein diameter to estimate central venous pressure.  · Trivial pericardial effusion.  · There is a left pleural effusion.      XR NG/OG tube placement check, non-radiologist performed  Narrative: EXAMINATION:  XR NG/OG TUBE PLACEMENT CHECK, NON-RADIOLOGIST PERFORMED    CLINICAL HISTORY:  ngt placement;    TECHNIQUE:  Single radiograph centered about the upper abdomen and lower chest to evaluate enteric tube placement is submitted.    COMPARISON:  Radiograph July 14, 2023    FINDINGS:  Enteric tube is noted, the distal tip extends subdiaphragmatic overlying the upper medial left abdomen.    An IVC filter is noted.  Postprocedural change of the right abdomen noted.  Monitoring leads overlie the patient.  Evaluation of the abdomen is limited.  The bowel gas pattern is nonspecific.  The osseous structures demonstrate chronic change.  Limited imaging of the lung bases demonstrates appearance that may relate to components of atelectasis and infiltrate as well as small pleural effusions, left greater than right.  Impression: Enteric tube is noted, the distal tip is subdiaphragmatic.    Additional findings as above.    Electronically signed by: Kenney Genao  Date:    07/16/2023  Time:    21:17  Echo  · The left ventricle is normal in size with normal systolic function.  · The estimated ejection fraction is 65-70%.  · Normal  left ventricular diastolic function.  · Normal right ventricular size with normal right ventricular systolic   function.  · The estimated PA systolic pressure is at least 38 mmHg.  · The central venous pressure is at least 8 mmHg.  · Biatrial enlargement.  · There is a left pleural effusion.     X-Ray Chest 1 View  Narrative: EXAMINATION:  XR CHEST 1 VIEW    CLINICAL HISTORY:  hypoxia;    TECHNIQUE:  Single frontal portable view of the chest was performed.    COMPARISON:  July 13, 2023 at 10:38    FINDINGS:  Enteric tube has been inserted since the previous examination.  The cardiac silhouette is mildly enlarged and stable.  Pulmonary vascularity is increased, and this has progressed.  Since the previous examination, there has been slight interval progression in the bilateral increased interstitial opacification with differential considerations including edema and infection.  There is new focal opacification at the left lung base consistent with a combination of pleural fluid and atelectasis.  The right hemidiaphragm it is indistinct with blunting of the right lateral costophrenic sulcus suggesting a small amount of right-sided pleural fluid as well.  No pneumothorax.  Visualized osseous structures are stable.  Impression: Detrimental changes since the previous examination including interval increase in pulmonary vascularity and bilateral diffuse interstitial pulmonary parenchymal opacification, progression of abnormal opacification at the left lung base, and development of small right-sided pleural effusion.  In the appropriate clinical setting, the findings would be consistent with congestive heart failure.    This report was flagged in Epic as abnormal.    Electronically signed by: Prema Nieves MD  Date:    07/16/2023  Time:    12:29      Labs, Imaging, EKG and Diagnostic results from 7/17/2023 were reviewed.    Medications:  Medication list was reviewed and changes noted under Assessment/Plan.  No current  facility-administered medications on file prior to encounter.     Current Outpatient Medications on File Prior to Encounter   Medication Sig Dispense Refill    ARIPiprazole (ABILIFY) 20 MG Tab Take 5 mg by mouth once daily.      folic acid (FOLVITE) 1 MG tablet Take 1 tablet (1 mg total) by mouth once daily. (Patient taking differently: Take 1 mg by mouth every evening.) 30 tablet 2    furosemide (LASIX) 20 MG tablet Take 20 mg by mouth once daily.      lactulose (CHRONULAC) 10 gram/15 mL solution Take 10 g by mouth 3 (three) times daily.      lithium carbonate 150 MG capsule Take 1 capsule (150 mg total) by mouth every evening. (Patient taking differently: Take 150 mg by mouth 2 (two) times a day.) 30 capsule 11    magnesium oxide (MAG-OX) 400 mg (241.3 mg magnesium) tablet Take by mouth once daily.      pantoprazole (PROTONIX) 40 MG tablet Take 40 mg by mouth once daily.      propranoloL (INDERAL) 40 MG tablet Take 40 mg by mouth once daily.      QUEtiapine (SEROQUEL) 300 MG Tab Take 100 mg by mouth every evening.      sodium bicarbonate 650 MG tablet Take 650 mg by mouth Daily.      spironolactone (ALDACTONE) 50 MG tablet Take 1 tablet (50 mg total) by mouth once daily. 90 tablet 3    zonisamide (ZONEGRAN) 100 MG Cap Take 100 mg by mouth once daily.      levETIRAcetam (KEPPRA) 1000 MG tablet Take 1,000 mg by mouth 2 (two) times daily.      OLANZapine (ZYPREXA) 10 MG tablet Take 1 tablet (10 mg total) by mouth every evening. (Patient not taking: Reported on 7/6/2023) 30 tablet 11    rifAXIMin (XIFAXAN) 550 mg Tab Take 1 tablet (550 mg total) by mouth 2 (two) times daily. (Patient not taking: Reported on 7/6/2023) 180 tablet 3     Scheduled Medications:  acetylcysteine 200 mg/ml (20%), 2 mL, Nebulization, TID WAKE  albuterol-ipratropium, 3 mL, Nebulization, Q6H WAKE  amoxicillin, 1,000 mg, Per NG tube, Q12H  cefTRIAXone (ROCEPHIN) IVPB, 1 g, Intravenous, Q24H  clarithromycin, 500 mg, Per NG tube, Q12H  lactulose,  10 g, Per NG tube, TID  levetiracetam, 1,000 mg, Per NG tube, BID  OLANZapine, 5 mg, Per NG tube, QHS  pantoprazole, 40 mg, Intravenous, BID      PRN: dextrose 10%, dextrose 10%, glucagon (human recombinant), hydrOXYzine HCL, insulin aspart U-100, sodium chloride 0.9%  Infusions:       Estimated Creatinine Clearance: 133.8 mL/min (A) (based on SCr of 0.4 mg/dL (L)).    Assessment/Plan:      * Gastrointestinal hemorrhage associated with duodenal ulcer    as  above        Hypoxia   7/16 sats 92% on 5L of NC.  CX ray - Detrimental changes since the previous examination including interval increase in pulmonary vascularity and bilateral diffuse interstitial pulmonary parenchymal opacification, progression of abnormal opacification at the left lung base, and development of small right-sided pleural effusion.   findings would be consistent with congestive heart failure.PT/OT recs SNF. BNP , procalcitonin and Echo.     H. pylori infection    positive serology for H pylori, begin triple therapy with clarithromycin,amoxicillin, and PPI for 14 days       Hematochezia    - GI EGD clipped duodenal ulcers for IR reference  - Transfuse blood products for active bleeding   - trend CBC and follow  - IR embolized GDA for duodenal hyperemia   -- GI re evaluated site of duodenal ulcers, no noted active bleeding  -- Continue PPI BID for 8 weeks, then once daily after that  --Pt had positive serology for H pylori, begin triple therapy with clarithromycin,amoxicillin, and PPI for 14 days      Acute blood loss anemia     - Maintain IV access with 2 large bore Ivs  - Intravascular resuscitation/support with IVFs   - Hold all NSAIDs and anticoagulants, unless contraindicated.  - Please correct any coagulopathy with platelets and FFP for goal of platelets >50K and INR <2.0  - Please notify GI team if there is significant change in patient's clinical status  - To date, patient has required at least 21 units of pRBCs to maintain Hgb >7      7/16     Patient's with macrocytic anemia.. Hemoglobin stable. Etiology likely due to acute blood loss.  Current CBC reviewed-    Recent Labs   Lab 07/14/23  0752 07/15/23  0350 07/16/23  0825   HGB 8.6* 8.1* 8.0*         Component Value Date/Time     (H) 07/16/2023 0825    RDW 24.7 (H) 07/16/2023 0825    IRON 132 05/18/2023 1527    FERRITIN 110 05/18/2023 1527    RETIC 3.9 (H) 06/05/2023 0935    FOLATE 16.2 05/31/2023 1250    [id number] >2000 (H) 05/31/2023 1250    OCCULTBLOOD Negative 09/19/2015 0137     Monitor CBC and transfuse if H/H drops below 7/21.        Retroperitoneal bleed  Retroperitoneum: No significant adenopathy.  Similar sized left retroperitoneal hematoma measuring 11 x 9 cm (series 5, image 100; previously 11 x 9 cm).  The left iliacus collection also measures similar to prior at 5.4 cm (series 5, image 127; previously 5.6 cm).  Layering hyperdensity within these collections suggesting different ages in blood products.  No significant volume active extravasation into these collections  - CT-A demonstrated stable retroperitoneal hematoma. No need for intervention at this time.          Supratherapeutic INR  patient with INR Recent Labs   Lab 07/14/23  0752 07/15/23  0350 07/17/23  0619   INR 1.7* 1.8* 1.9*   . INR is supratherapeutic. secondary to coagulopathy from liver disease.monitor      UTI (urinary tract infection)  7/17 Saldaña removed. UA +. UC pending. started on IV ceftriaxone.    Bipolar 1 disorder     - Consult psych regarding Bipolar 1 disorder history       Hepatic encephalopathy   ammonia 190s on admit -->90s . AAOX 1. no lactulose give due to GI bleed  repeat ammonia. Hepatolog eval  with     MELD 3.0: 21 at 7/17/2023  6:19 AM  MELD-Na: 18 at 7/17/2023  6:19 AM  Calculated from:  Serum Creatinine: 0.4 mg/dL (Using min of 1 mg/dL) at 7/17/2023  6:19 AM  Serum Sodium: 143 mmol/L (Using max of 137 mmol/L) at 7/17/2023  6:19 AM  Total Bilirubin: 3.0 mg/dL at 7/17/2023  6:19  AM  Serum Albumin: 2.2 g/dL at 7/17/2023  6:19 AM  INR(ratio): 1.9 at 7/17/2023  6:19 AM  Age at listing (hypothetical): 58 years  Sex: Female at 7/17/2023  6:19 AM  7/16 Ammonia 48 WNL. AAOX 1. Hepatology consulted for cirrhosis.    7/17  Hepatology eval. resumed lactulose .     Severe protein-calorie malnutrition  Nutrition consulted. Most recent weight and BMI monitored-     Measurements:  Wt Readings from Last 1 Encounters:   07/16/23 62.7 kg (138 lb 4.4 oz)   Body mass index is 25.29 kg/m².    Patient has been screened and assessed by RD.    Malnutrition Type:  Context: social/environmental circumstances  Level: severe    Malnutrition Characteristic Summary:  Energy Intake (Malnutrition): less than or equal to 75% for greater than or equal to 1 month  Subcutaneous Fat (Malnutrition): severe depletion  Muscle Mass (Malnutrition): severe depletion      History of seizure  on Keppra via NGT       Thrombocytopenia  with cirrhosis   Patient with thrombocytopenia   Recent Labs   Lab 07/15/23  0350 07/16/23  0825 07/17/23  0619   PLT 42* 41* 34*   . Platelet counts stable  .monitor      Cirrhosis, Laennec's  alcoholic cirrhosis  MELD 3.0: 20 at 7/16/2023  4:51 AM  MELD-Na: 17 at 7/16/2023  4:51 AM  Calculated from:  Serum Creatinine: 0.4 mg/dL (Using min of 1 mg/dL) at 7/16/2023  4:51 AM  Serum Sodium: 145 mmol/L (Using max of 137 mmol/L) at 7/16/2023  4:51 AM  Total Bilirubin: 3.0 mg/dL at 7/16/2023  4:51 AM  Serum Albumin: 2.3 g/dL at 7/16/2023  4:51 AM  INR(ratio): 1.8 at 7/15/2023  3:50 AM  Age at listing (hypothetical): 58 years  Sex: Female at 7/16/2023  4:51 AM     Recent Labs   Lab 07/16/23  1201   AMMONIA 48     Strict input /output monitor, daily weights        VTE Risk Mitigation (From admission, onward)           Ordered     Place sequential compression device  Until discontinued         07/14/23 1024     Reason for No Pharmacological VTE Prophylaxis  Once        Question:  Reasons:  Answer:  Active  Bleeding    07/05/23 1334     IP VTE HIGH RISK PATIENT  Once         07/05/23 1334                    Discharge Planning   BRAYAN: 7/21/2023     Code Status: DNR   Is the patient medically ready for discharge?: No    Reason for patient still in hospital (select all that apply): Treatment  Discharge Plan A: Skilled Nursing Facility   Discharge Delays: None known at this time              Seth Noyola MD  Department of Hospital Medicine   [facility] - Telemetry Stepdown

## 2023-07-17 NOTE — PLAN OF CARE
Problem: Infection  Goal: Absence of Infection Signs and Symptoms  Outcome: Ongoing, Progressing     Problem: Adult Inpatient Plan of Care  Goal: Patient-Specific Goal (Individualized)  Outcome: Ongoing, Progressing     Problem: Renal Function Impairment (Acute Kidney Injury/Impairment)  Goal: Effective Renal Function  Outcome: Ongoing, Progressing     Pt oriented to self only. Pt continues to have disorganized thoughts and talks when no one is around. 4L NC. VSS. NSR to ST on tele. R NGT with TF at 50cc/hr. 250cc water flushes given. Wounds cleansed and redressed. BG WNL. Pt NPO. U/A sent. Pt not urinating on her own. In and out x1. Morning bladder scan of 272. FMS tube with 50cc output. Mittens in place. Turned q2hr. Safety maintained. Bed alarm in use.

## 2023-07-18 PROBLEM — L24.A2 IRRITANT CONTACT DERMATITIS DUE TO FECAL, URINARY OR DUAL INCONTINENCE: Status: ACTIVE | Noted: 2023-07-18

## 2023-07-18 PROBLEM — R13.10 DYSPHAGIA: Status: ACTIVE | Noted: 2023-07-18

## 2023-07-18 LAB
ALBUMIN SERPL BCP-MCNC: 2.2 G/DL (ref 3.5–5.2)
ALP SERPL-CCNC: 177 U/L (ref 55–135)
ALT SERPL W/O P-5'-P-CCNC: 10 U/L (ref 10–44)
ANION GAP SERPL CALC-SCNC: 4 MMOL/L (ref 8–16)
ANISOCYTOSIS BLD QL SMEAR: SLIGHT
APTT PPP: 31.7 SEC (ref 21–32)
AST SERPL-CCNC: 26 U/L (ref 10–40)
BASOPHILS # BLD AUTO: 0.02 K/UL (ref 0–0.2)
BASOPHILS NFR BLD: 0.7 % (ref 0–1.9)
BILIRUB SERPL-MCNC: 2.9 MG/DL (ref 0.1–1)
BUN SERPL-MCNC: 8 MG/DL (ref 6–20)
CA-I BLDV-SCNC: 1.19 MMOL/L (ref 1.06–1.42)
CA-I BLDV-SCNC: 1.24 MMOL/L (ref 1.06–1.42)
CALCIUM SERPL-MCNC: 8 MG/DL (ref 8.7–10.5)
CHLORIDE SERPL-SCNC: 118 MMOL/L (ref 95–110)
CO2 SERPL-SCNC: 20 MMOL/L (ref 23–29)
CREAT SERPL-MCNC: 0.4 MG/DL (ref 0.5–1.4)
DIFFERENTIAL METHOD: ABNORMAL
EOSINOPHIL # BLD AUTO: 0.1 K/UL (ref 0–0.5)
EOSINOPHIL NFR BLD: 4 % (ref 0–8)
ERYTHROCYTE [DISTWIDTH] IN BLOOD BY AUTOMATED COUNT: 25.4 % (ref 11.5–14.5)
EST. GFR  (NO RACE VARIABLE): >60 ML/MIN/1.73 M^2
GLUCOSE SERPL-MCNC: 148 MG/DL (ref 70–110)
HCT VFR BLD AUTO: 24.9 % (ref 37–48.5)
HGB BLD-MCNC: 7.8 G/DL (ref 12–16)
HYPOCHROMIA BLD QL SMEAR: ABNORMAL
IMM GRANULOCYTES # BLD AUTO: 0.02 K/UL (ref 0–0.04)
IMM GRANULOCYTES NFR BLD AUTO: 0.7 % (ref 0–0.5)
INR PPP: 1.7 (ref 0.8–1.2)
LYMPHOCYTES # BLD AUTO: 0.4 K/UL (ref 1–4.8)
LYMPHOCYTES NFR BLD: 13 % (ref 18–48)
MAGNESIUM SERPL-MCNC: 1.7 MG/DL (ref 1.6–2.6)
MCH RBC QN AUTO: 32.1 PG (ref 27–31)
MCHC RBC AUTO-ENTMCNC: 31.3 G/DL (ref 32–36)
MCV RBC AUTO: 103 FL (ref 82–98)
MONOCYTES # BLD AUTO: 0.3 K/UL (ref 0.3–1)
MONOCYTES NFR BLD: 9 % (ref 4–15)
NEUTROPHILS # BLD AUTO: 2.2 K/UL (ref 1.8–7.7)
NEUTROPHILS NFR BLD: 72.6 % (ref 38–73)
NRBC BLD-RTO: 0 /100 WBC
OVALOCYTES BLD QL SMEAR: ABNORMAL
PLATELET # BLD AUTO: 32 K/UL (ref 150–450)
PLATELET BLD QL SMEAR: ABNORMAL
PMV BLD AUTO: 9.9 FL (ref 9.2–12.9)
POCT GLUCOSE: 161 MG/DL (ref 70–110)
POCT GLUCOSE: 189 MG/DL (ref 70–110)
POIKILOCYTOSIS BLD QL SMEAR: SLIGHT
POLYCHROMASIA BLD QL SMEAR: ABNORMAL
POTASSIUM SERPL-SCNC: 3.9 MMOL/L (ref 3.5–5.1)
PROT SERPL-MCNC: 4.6 G/DL (ref 6–8.4)
PROTHROMBIN TIME: 17.2 SEC (ref 9–12.5)
RBC # BLD AUTO: 2.43 M/UL (ref 4–5.4)
SODIUM SERPL-SCNC: 142 MMOL/L (ref 136–145)
WBC # BLD AUTO: 3 K/UL (ref 3.9–12.7)

## 2023-07-18 PROCEDURE — 99233 SBSQ HOSP IP/OBS HIGH 50: CPT | Mod: ,,, | Performed by: HOSPITALIST

## 2023-07-18 PROCEDURE — 63600175 PHARM REV CODE 636 W HCPCS: Performed by: HOSPITALIST

## 2023-07-18 PROCEDURE — 25000242 PHARM REV CODE 250 ALT 637 W/ HCPCS: Performed by: HOSPITALIST

## 2023-07-18 PROCEDURE — 99233 PR SUBSEQUENT HOSPITAL CARE,LEVL III: ICD-10-PCS | Mod: ,,, | Performed by: HOSPITALIST

## 2023-07-18 PROCEDURE — 94668 MNPJ CHEST WALL SBSQ: CPT

## 2023-07-18 PROCEDURE — 80053 COMPREHEN METABOLIC PANEL: CPT

## 2023-07-18 PROCEDURE — 85610 PROTHROMBIN TIME: CPT | Performed by: HOSPITALIST

## 2023-07-18 PROCEDURE — 99223 PR INITIAL HOSPITAL CARE,LEVL III: ICD-10-PCS | Mod: ,,, | Performed by: NURSE PRACTITIONER

## 2023-07-18 PROCEDURE — 25500020 PHARM REV CODE 255: Performed by: HOSPITALIST

## 2023-07-18 PROCEDURE — 92611 MOTION FLUOROSCOPY/SWALLOW: CPT

## 2023-07-18 PROCEDURE — 94640 AIRWAY INHALATION TREATMENT: CPT

## 2023-07-18 PROCEDURE — 27000221 HC OXYGEN, UP TO 24 HOURS

## 2023-07-18 PROCEDURE — 20600001 HC STEP DOWN PRIVATE ROOM

## 2023-07-18 PROCEDURE — 97535 SELF CARE MNGMENT TRAINING: CPT

## 2023-07-18 PROCEDURE — C9113 INJ PANTOPRAZOLE SODIUM, VIA: HCPCS | Performed by: STUDENT IN AN ORGANIZED HEALTH CARE EDUCATION/TRAINING PROGRAM

## 2023-07-18 PROCEDURE — 25000003 PHARM REV CODE 250

## 2023-07-18 PROCEDURE — A9698 NON-RAD CONTRAST MATERIALNOC: HCPCS | Performed by: HOSPITALIST

## 2023-07-18 PROCEDURE — 83735 ASSAY OF MAGNESIUM: CPT

## 2023-07-18 PROCEDURE — 25000003 PHARM REV CODE 250: Performed by: HOSPITALIST

## 2023-07-18 PROCEDURE — 99232 SBSQ HOSP IP/OBS MODERATE 35: CPT | Mod: ,,, | Performed by: PSYCHIATRY & NEUROLOGY

## 2023-07-18 PROCEDURE — 94761 N-INVAS EAR/PLS OXIMETRY MLT: CPT

## 2023-07-18 PROCEDURE — 82330 ASSAY OF CALCIUM: CPT | Mod: 91

## 2023-07-18 PROCEDURE — 85730 THROMBOPLASTIN TIME PARTIAL: CPT

## 2023-07-18 PROCEDURE — 85025 COMPLETE CBC W/AUTO DIFF WBC: CPT

## 2023-07-18 PROCEDURE — 99232 PR SUBSEQUENT HOSPITAL CARE,LEVL II: ICD-10-PCS | Mod: ,,, | Performed by: PSYCHIATRY & NEUROLOGY

## 2023-07-18 PROCEDURE — 97112 NEUROMUSCULAR REEDUCATION: CPT

## 2023-07-18 PROCEDURE — 36415 COLL VENOUS BLD VENIPUNCTURE: CPT

## 2023-07-18 PROCEDURE — 99900035 HC TECH TIME PER 15 MIN (STAT)

## 2023-07-18 PROCEDURE — 99223 1ST HOSP IP/OBS HIGH 75: CPT | Mod: ,,, | Performed by: NURSE PRACTITIONER

## 2023-07-18 PROCEDURE — 97530 THERAPEUTIC ACTIVITIES: CPT

## 2023-07-18 PROCEDURE — 63600175 PHARM REV CODE 636 W HCPCS: Performed by: STUDENT IN AN ORGANIZED HEALTH CARE EDUCATION/TRAINING PROGRAM

## 2023-07-18 RX ADMIN — BARIUM SULFATE 75 ML: 0.81 POWDER, FOR SUSPENSION ORAL at 02:07

## 2023-07-18 RX ADMIN — LEVETIRACETAM 1000 MG: 500 SOLUTION ORAL at 09:07

## 2023-07-18 RX ADMIN — IPRATROPIUM BROMIDE AND ALBUTEROL SULFATE 3 ML: 2.5; .5 SOLUTION RESPIRATORY (INHALATION) at 07:07

## 2023-07-18 RX ADMIN — CEFTRIAXONE 1 G: 1 INJECTION, POWDER, FOR SOLUTION INTRAMUSCULAR; INTRAVENOUS at 09:07

## 2023-07-18 RX ADMIN — CLARITHROMYCIN 500 MG: 500 TABLET, FILM COATED ORAL at 10:07

## 2023-07-18 RX ADMIN — PANTOPRAZOLE SODIUM 40 MG: 40 INJECTION, POWDER, FOR SOLUTION INTRAVENOUS at 10:07

## 2023-07-18 RX ADMIN — CLARITHROMYCIN 500 MG: 500 TABLET, FILM COATED ORAL at 09:07

## 2023-07-18 RX ADMIN — LACTULOSE 10 G: 20 SOLUTION ORAL at 09:07

## 2023-07-18 RX ADMIN — AMOXICILLIN 1000 MG: 500 CAPSULE ORAL at 09:07

## 2023-07-18 RX ADMIN — OLANZAPINE 5 MG: 5 TABLET, FILM COATED ORAL at 10:07

## 2023-07-18 RX ADMIN — LACTULOSE 10 G: 20 SOLUTION ORAL at 03:07

## 2023-07-18 RX ADMIN — ACETYLCYSTEINE 2 ML: 200 SOLUTION ORAL; RESPIRATORY (INHALATION) at 07:07

## 2023-07-18 RX ADMIN — LEVETIRACETAM 1000 MG: 500 SOLUTION ORAL at 10:07

## 2023-07-18 RX ADMIN — INSULIN ASPART 2 UNITS: 100 INJECTION, SOLUTION INTRAVENOUS; SUBCUTANEOUS at 03:07

## 2023-07-18 RX ADMIN — AMOXICILLIN 1000 MG: 500 CAPSULE ORAL at 10:07

## 2023-07-18 RX ADMIN — PANTOPRAZOLE SODIUM 40 MG: 40 INJECTION, POWDER, FOR SOLUTION INTRAVENOUS at 09:07

## 2023-07-18 NOTE — CONSULTS
[facility] - Telemetry Stepdown  Skin Integrity NIKKI  Consult Note    Patient Name: Marely Hamilton  MRN: [id number]  Admission Date: 7/5/2023  Hospital Length of Stay: 13 days  Attending Physician: Seth Noyola MD  Primary Care Provider: Viktor Ross MD     Consults  Subjective:     History of Present Illness:  Marely Hamilton is a 58 year old female with history of alcoholic cirrhosis, seizure disorder, DVT and IJ thrombus with recent admission for large retroperitoneal hemorrhage requiring IR embolization and IVC filter placement who presents for hematochezia. Onset this morning at SNF, alida blood per rectum per chart, sent to ED. Initially normotensive but then severe hypotension prompted initiation of levophed and intubation. Hgb 6.8, 2u pRBC given + 1u FFP ordered. Hgb 8.5 on 7/2. On levo and vaso currently. K 6.6 but renal function at baseline. Repeat pending. No prior EGDs on record. Pt admitted to critical care medicine service for further management. Skin integrity NIKKI consulted for evaluation of skin injury.      Scheduled Meds:   acetylcysteine 200 mg/ml (20%)  2 mL Nebulization TID WAKE    albuterol-ipratropium  3 mL Nebulization Q6H WAKE    amoxicillin  1,000 mg Per NG tube Q12H    cefTRIAXone (ROCEPHIN) IVPB  1 g Intravenous Q24H    clarithromycin  500 mg Per NG tube Q12H    lactulose  10 g Per NG tube TID    levetiracetam  1,000 mg Per NG tube BID    OLANZapine  5 mg Per NG tube QHS    pantoprazole  40 mg Intravenous BID     Continuous Infusions:  PRN Meds:dextrose 10%, dextrose 10%, glucagon (human recombinant), insulin aspart U-100, sodium chloride 0.9%    Review of patient's allergies indicates:   Allergen Reactions    Sulfa (sulfonamide antibiotics) Rash    Codeine Nausea And Vomiting        Past Medical History:   Diagnosis Date    Addiction to drug     Alcohol abuse     Alcohol abuse, in remission 6/15/2015    14.5 weeks ago; AA weekly    Anemia     Anxiety 6/15/2015     Behavioral problem     Bipolar disorder     Bipolar disorder in remission 6/15/2015    Cirrhosis, Laennec's 6/15/2015    Depression     Encounter for blood transfusion     Epistaxis 6/15/2015    Fatigue     Glaucoma     Hematuria     Hepatic encephalopathy 6/15/2015    Hepatic enlargement     History of psychiatric care     History of psychiatric hospitalization     History of seizure 6/15/2015    1    hx of intentional Tylenol overdose 6/15/2015    2005- situational and hx of bipolar    Hx of psychiatric care     Macrocytic anemia 9/18/2015    6 units PRBC since June 2015    Jeana     Osteoarthritis     Other ascites 6/15/2015    Psychiatric exam requested by authority     Psychiatric problem     Psychosis 9/26/2019    Renal disorder     Seizures     Self-harming behavior     Suicide attempt     Therapy     Thrombocytopenia 6/15/2015     Past Surgical History:   Procedure Laterality Date    COSMETIC SURGERY      EMBOLIZATION N/A 6/11/2023    Procedure: EMBOLIZATION, BLOOD VESSEL;  Surgeon: Debbie Surgeon;  [facility]: [facility];  Service: Anesthesiology;  Laterality: N/A;    ESOPHAGOGASTRODUODENOSCOPY      ESOPHAGOGASTRODUODENOSCOPY N/A 7/6/2023    Procedure: EGD (ESOPHAGOGASTRODUODENOSCOPY);  Surgeon: Bernice Guillen MD;  Location: [facility] ([address] [place] [address]);  Service: Endoscopy;  Laterality: N/A;    ESOPHAGOGASTRODUODENOSCOPY N/A 7/11/2023    Procedure: EGD (ESOPHAGOGASTRODUODENOSCOPY);  Surgeon: Rangel Broussard MD;  Location: [address]);  Service: Endoscopy;  Laterality: N/A;       Family History       Problem Relation (Age of Onset)    Alcohol abuse Father, Sister, Paternal Grandfather    Bipolar disorder Father    Hypertension Mother    Suicide Father          Tobacco Use    Smoking status: Never    Smokeless tobacco: Never   Substance and Sexual Activity    Alcohol use: Not Currently     Comment: hx of ETOH abuse with cirrhosis of liver    Drug use: No    Sexual  activity: Not Currently     Review of Systems   Skin:  Positive for wound.     Objective:     Vital Signs (Most Recent):  Temp: 98.3 °F (36.8 °C) (07/18/23 1137)  Pulse: 108 (07/18/23 1137)  Resp: 19 (07/18/23 1137)  BP: (!) 120/59 (07/18/23 1137)  SpO2: (!) 92 % (07/18/23 1137) Vital Signs (24h Range):  Temp:  [98.3 °F (36.8 °C)-98.8 °F (37.1 °C)] 98.3 °F (36.8 °C)  Pulse:  [] 108  Resp:  [15-20] 19  SpO2:  [90 %-100 %] 92 %  BP: (100-136)/(52-60) 120/59     Weight: 61.3 kg (135 lb 2.3 oz)  Body mass index is 24.72 kg/m².     Physical Exam  Constitutional:       Appearance: Normal appearance.   Skin:     General: Skin is warm and dry.      Findings: Lesion present.   Neurological:      Mental Status: She is alert.        Laboratory:  All pertinent labs reviewed within the last 24 hours.    Diagnostic Results:  None        Assessment/Plan:         NIKKI Skin Integrity Evaluation      Skin Integrity NIKKI evaluation of patient as part of the comprehensive skin care team.     She has been admitted for 13 days. Skin injury was noted on 6/29/23. POA yes.    Sacrum            Derm  Irritant contact dermatitis due to fecal, urinary or dual incontinence  - consult received for evaluation of skin injury.  - Recent admission for large retroperitoneal hemorrhage requiring IR embolization and IVC filter placement now pt presents for hematochezia.  - well known to wound care dept.  - seen previously for moisture dermatitis to sacrum and buttocks.  - scattered partial thickness tissue loss to area with pink, tan, moist wound bed. Periwound intact. Appears to be related to moisture from incontinence.   - female external urinary catheter and rectal tube in place. Leaking noted and incontinence episode cleansed.  - continue Triad bid/prn.  - Immerse surface utilized.  - pillows and heel boots for offloading.  - pt pleasantly confused and wearing soft mitten restraints.  - strict turning q2h.  - RD following to optimize nutriton.  TFs infusing at goal.  - nursing to maintain pressure injury prevention measures and continue wound care per orders.         Thank you for your consult. I will follow-up with patient. Please contact us if you have any additional questions.       Gabriella Jackson NP  Skin Integrity NIKKI  [facility] - Telemetry Stepdown

## 2023-07-18 NOTE — ASSESSMENT & PLAN NOTE
- consult received for evaluation of skin injury.  - Recent admission for large retroperitoneal hemorrhage requiring IR embolization and IVC filter placement now pt presents for hematochezia.  - well known to wound care dept.  - seen previously for moisture dermatitis to sacrum and buttocks.  - scattered partial thickness tissue loss to area with pink, tan, moist wound bed. Periwound intact. Appears to be related to moisture from incontinence.   - female external urinary catheter and rectal tube in place. Leaking noted and incontinence episode cleansed.  - continue Triad bid/prn.  - Immerse surface utilized.  - pillows and heel boots for offloading.  - pt pleasantly confused and wearing soft mitten restraints.  - strict turning q2h.  - RD following to optimize nutriton. TFs infusing at goal.  - nursing to maintain pressure injury prevention measures and continue wound care per orders.

## 2023-07-18 NOTE — PLAN OF CARE
[facility] - Telemetry Stepdown  Discharge Reassessment    Primary Care Provider: Viktor Ross MD    Expected Discharge Date: 7/21/2023    Reassessment (most recent)       Discharge Reassessment - 07/18/23 1442          Discharge Reassessment    Assessment Type Discharge Planning Reassessment     Did the patient's condition or plan change since previous assessment? No     Discharge Plan discussed with: --   Attempted to contact patient's sisterZaria [phone]    Discharge Plan A Skilled Nursing Facility     Discharge Plan B Home with family;Home Health     DME Needed Upon Discharge  none     Transition of Care Barriers None     Why the patient remains in the hospital Requires continued medical care   NGT and is in restraints/mittens       Post-Acute Status    Post-Acute Authorization Placement     Post-Acute Placement Status Pending post-acute provider review/more information requested     Discharge Delays None known at this time                 Mirela Cintron, [facility]  [facility]  Ext. [phone]

## 2023-07-18 NOTE — HPI
Marely Hamilton is a 58 year old female with history of alcoholic cirrhosis, seizure disorder, DVT and IJ thrombus with recent admission for large retroperitoneal hemorrhage requiring IR embolization and IVC filter placement who presents for hematochezia. Onset this morning at SNF, alida blood per rectum per chart, sent to ED. Initially normotensive but then severe hypotension prompted initiation of levophed and intubation. Hgb 6.8, 2u pRBC given + 1u FFP ordered. Hgb 8.5 on 7/2. On levo and vaso currently. K 6.6 but renal function at baseline. Repeat pending. No prior EGDs on record. Pt admitted to critical care medicine service for further management. Skin integrity NIKKI consulted for evaluation of skin injury.

## 2023-07-18 NOTE — PLAN OF CARE
Problem: Infection  Goal: Absence of Infection Signs and Symptoms  Outcome: Ongoing, Progressing     Problem: Adult Inpatient Plan of Care  Goal: Plan of Care Review  Outcome: Ongoing, Progressing  Goal: Patient-Specific Goal (Individualized)  Outcome: Ongoing, Progressing  Goal: Absence of Hospital-Acquired Illness or Injury  Outcome: Ongoing, Progressing  Goal: Optimal Comfort and Wellbeing  Outcome: Ongoing, Progressing  Goal: Readiness for Transition of Care  Outcome: Ongoing, Progressing     Problem: Fluid and Electrolyte Imbalance (Acute Kidney Injury/Impairment)  Goal: Fluid and Electrolyte Balance  Outcome: Ongoing, Progressing     Problem: Oral Intake Inadequate (Acute Kidney Injury/Impairment)  Goal: Optimal Nutrition Intake  Outcome: Ongoing, Progressing     Problem: Renal Function Impairment (Acute Kidney Injury/Impairment)  Goal: Effective Renal Function  Outcome: Ongoing, Progressing     Problem: Impaired Wound Healing  Goal: Optimal Wound Healing  Outcome: Ongoing, Progressing     Problem: Fall Injury Risk  Goal: Absence of Fall and Fall-Related Injury  Outcome: Ongoing, Progressing     Problem: Restraint, Nonbehavioral (Nonviolent)  Goal: Absence of Harm or Injury  Outcome: Ongoing, Progressing     Problem: Nutrition Impairment (Mechanical Ventilation, Invasive)  Goal: Optimal Nutrition Delivery  Outcome: Ongoing, Progressing     Problem: Skin and Tissue Injury (Mechanical Ventilation, Invasive)  Goal: Absence of Device-Related Skin and Tissue Injury  Outcome: Ongoing, Progressing     Problem: Ventilator-Induced Lung Injury (Mechanical Ventilation, Invasive)  Goal: Absence of Ventilator-Induced Lung Injury  Outcome: Ongoing, Progressing     Problem: Communication Impairment (Artificial Airway)  Goal: Effective Communication  Outcome: Ongoing, Progressing     Problem: Device-Related Complication Risk (Artificial Airway)  Goal: Optimal Device Function  Outcome: Ongoing, Progressing     Problem: Skin  and Tissue Injury (Artificial Airway)  Goal: Absence of Device-Related Skin or Tissue Injury  Outcome: Ongoing, Progressing     Problem: Skin Injury Risk Increased  Goal: Skin Health and Integrity  Outcome: Ongoing, Progressing      Pt oriented to self, time, place. VSS. No complaints of pain or discomfort. Routine meds administered via NG tube. Pt free from falls. Monitoring ongoing.

## 2023-07-18 NOTE — PROGRESS NOTES
"CONSULTATION LIAISON PSYCHIATRY PROGRESS NOTE    Patient Name: Marely Hamilton  MRN: [id number]  Patient Class: IP- Inpatient  Admission Date: 7/5/2023  Attending Physician: Seth Noyola MD      SUBJECTIVE:   Marely Hamilton is a 58 y.o. female with past psychiatric history of bipolar disorder, alcohol use disorder & past pertinent medical history of seizure disorder, alcohol induced hepatic cirrhosis presents to the ED/admitted to the hospital for Gastrointestinal hemorrhage associated with duodenal ulcer. Patient presented from SNF (when she experienced alida blood per rectum per chart review), for which she was recently discharged to when she presented to the hospital on for hepatic encephalopathy c/b retroperitoneal bleed (s/p ICV filter and IR embolization).      Psychiatry consulted for "Bipolar disorder history"    Today, patient was awake and alert. She is  oriented to person and year. She stated that the month was March. She was unable to state why she was in the hospital. She stated that she slept well last night. Patient's assessment was largely limited due to incomprehensible, soft speech today.       OBJECTIVE:    Mental Status Exam:  General Appearance: dressed in hospital garb, appears older than stated age  Behavior: polite, appropriate eye-contact, minimal responses  Involuntary Movements and Motor Activity: Patient with chewing movements of mouth at rest  Gait and Station: unable to assess - patient lying down or seated  Speech and Language: decreased spontaneity, slowed, soft, incoherent, responds to questions minimally/briefly, unintelligible  Mood: Unable to assess  Affect: flat  Thought Process and Associations: Unable to Assess  Thought Content and Perceptions:: Unable to Assess  Sensorium and Orientation: delirious, oriented to year, oriented to person  Recent and Remote Memory: impaired, Unable to  Formally Assess  Attention and Concentration: impaired, easily distractible  Fund of " Knowledge: Unable to Formally Assess  Insight: impaired, limited  Judgment: impaired, limited        ASSESSMENT & RECOMMENDATIONS   Delirium  Hx of Bipolar Disorder  DELIRIUM  DELIRIUM BEHAVIOR MANAGEMENT  Continue to Limit or Discontinue use of Narcotics, Benzos and Anti-cholinergic medications as they may worsen delirium.  PLEASE utilize CHEMICAL restraints with PRN meds first for agitation. Minimize use of PHYSICAL restraints OR have periods of being out of physical restraints if possible.  Keep window shades open and room lit during day and room dim at night in order to promote normal sleep-wake cycles  Encourage family at bedside. Perry patient often to situation, location, date.  Continue medical workup for causative etiology of Delirium     PSYCH MEDICATIONS  Continue zyprexa 5 mg QHS  PRN: Does not appear to be requiring PRNs at this time, in future if patient does become agitated recommend Zyprexa 5 mg PO/IM.   In future, once patient stabilized, recommend restarting home lithium, likely in outpatient setting  Monitor QTc with daily EKGs while on zyprexa. Recommend Repeat EKG to monitor QTc     RISK ASSESSMENT  NO NEED FOR PEC Patient not in any imminent danger of hurting self or others  FOLLOW UP  Will follow up while in house     DISPOSITION - once medically cleared:   Defer to medical team       Please contact ON CALL psychiatry service (24/7) for any acute issues that may arise.    Dr. Mehdi VACA Psychiatry  [facility]  7/18/2023 12:27 PM        --------------------------------------------------------------------------------------------------------------------------------------------------------------------------------------------------------------------------------------    CONTINUED OBJECTIVE clinical data & findings reviewed and noted for above decision making    Current Medications:   Scheduled Meds:    acetylcysteine 200 mg/ml (20%)  2 mL Nebulization TID WAKE     albuterol-ipratropium  3 mL Nebulization Q6H WAKE    amoxicillin  1,000 mg Per NG tube Q12H    cefTRIAXone (ROCEPHIN) IVPB  1 g Intravenous Q24H    clarithromycin  500 mg Per NG tube Q12H    lactulose  10 g Per NG tube TID    levetiracetam  1,000 mg Per NG tube BID    OLANZapine  5 mg Per NG tube QHS    pantoprazole  40 mg Intravenous BID     PRN Meds: dextrose 10%, dextrose 10%, glucagon (human recombinant), hydrOXYzine HCL, insulin aspart U-100, sodium chloride 0.9%    Allergies:   Review of patient's allergies indicates:   Allergen Reactions    Sulfa (sulfonamide antibiotics) Rash    Codeine Nausea And Vomiting       Vitals  Vitals:    07/18/23 1137   BP: (!) 120/59   Pulse: 108   Resp: 19   Temp: 98.3 °F (36.8 °C)       Labs/Imaging/Studies:  Recent Results (from the past 24 hour(s))   POCT glucose    Collection Time: 07/17/23  4:10 PM   Result Value Ref Range    POCT Glucose 180 (H) 70 - 110 mg/dL   Calcium, ionized    Collection Time: 07/17/23  5:19 PM   Result Value Ref Range    Ionized Calcium 1.19 1.06 - 1.42 mmol/L   POCT glucose    Collection Time: 07/17/23 11:59 PM   Result Value Ref Range    POCT Glucose 161 (H) 70 - 110 mg/dL   APTT    Collection Time: 07/18/23  5:25 AM   Result Value Ref Range    aPTT 31.7 21.0 - 32.0 sec   Magnesium    Collection Time: 07/18/23  5:25 AM   Result Value Ref Range    Magnesium 1.7 1.6 - 2.6 mg/dL   Calcium, ionized    Collection Time: 07/18/23  5:25 AM   Result Value Ref Range    Ionized Calcium 1.19 1.06 - 1.42 mmol/L   CBC Auto Differential    Collection Time: 07/18/23  5:25 AM   Result Value Ref Range    WBC 3.00 (L) 3.90 - 12.70 K/uL    RBC 2.43 (L) 4.00 - 5.40 M/uL    Hemoglobin 7.8 (L) 12.0 - 16.0 g/dL    Hematocrit 24.9 (L) 37.0 - 48.5 %     (H) 82 - 98 fL    MCH 32.1 (H) 27.0 - 31.0 pg    MCHC 31.3 (L) 32.0 - 36.0 g/dL    RDW 25.4 (H) 11.5 - 14.5 %    Platelets 32 (LL) 150 - 450 K/uL    MPV 9.9 9.2 - 12.9 fL    Immature Granulocytes 0.7 (H) 0.0 - 0.5 %     Gran # (ANC) 2.2 1.8 - 7.7 K/uL    Immature Grans (Abs) 0.02 0.00 - 0.04 K/uL    Lymph # 0.4 (L) 1.0 - 4.8 K/uL    Mono # 0.3 0.3 - 1.0 K/uL    Eos # 0.1 0.0 - 0.5 K/uL    Baso # 0.02 0.00 - 0.20 K/uL    nRBC 0 0 /100 WBC    Gran % 72.6 38.0 - 73.0 %    Lymph % 13.0 (L) 18.0 - 48.0 %    Mono % 9.0 4.0 - 15.0 %    Eosinophil % 4.0 0.0 - 8.0 %    Basophil % 0.7 0.0 - 1.9 %    Platelet Estimate Decreased (A)     Aniso Slight     Poik Slight     Poly Occasional     Hypo Occasional     Ovalocytes Occasional     Differential Method Automated    Comprehensive Metabolic Panel    Collection Time: 07/18/23  5:25 AM   Result Value Ref Range    Sodium 142 136 - 145 mmol/L    Potassium 3.9 3.5 - 5.1 mmol/L    Chloride 118 (H) 95 - 110 mmol/L    CO2 20 (L) 23 - 29 mmol/L    Glucose 148 (H) 70 - 110 mg/dL    BUN 8 6 - 20 mg/dL    Creatinine 0.4 (L) 0.5 - 1.4 mg/dL    Calcium 8.0 (L) 8.7 - 10.5 mg/dL    Total Protein 4.6 (L) 6.0 - 8.4 g/dL    Albumin 2.2 (L) 3.5 - 5.2 g/dL    Total Bilirubin 2.9 (H) 0.1 - 1.0 mg/dL    Alkaline Phosphatase 177 (H) 55 - 135 U/L    AST 26 10 - 40 U/L    ALT 10 10 - 44 U/L    eGFR >60.0 >60 mL/min/1.73 m^2    Anion Gap 4 (L) 8 - 16 mmol/L   Protime-INR    Collection Time: 07/18/23  5:25 AM   Result Value Ref Range    Prothrombin Time 17.2 (H) 9.0 - 12.5 sec    INR 1.7 (H) 0.8 - 1.2   POCT glucose    Collection Time: 07/18/23 11:35 AM   Result Value Ref Range    POCT Glucose 189 (H) 70 - 110 mg/dL     Imaging Results              CTA Acute GI [place], Abdomen and Pelvis (Final result)  Result time 07/05/23 17:15:19      Final result by Mumtaz Garsia MD (07/05/23 17:15:19)                   Impression:      Images are degraded by significant streak and motion artifacts.    Stable large left retroperitoneal hematoma and left iliacus hematoma.  No contrast extravasation within the hematomas or bowel to indicate active arterial bleed.    Hepatic cirrhosis.  Diffuse wall thickening of the stomach and  colon, which can be seen in the setting of hepatic dysfunction.  However, superimposed inflammatory processes are not excluded.    Multiple, nondilated, distended fluid-filled loops of small bowel without a definite transition point.  Stool and air seen within the colon, this is suggestive of ileus.    Small left pleural effusion with associated compressive atelectasis.    Cholelithiasis.    Cardiomegaly.    Electronically signed by resident: Emiliano Mcmahon  Date:    07/05/2023  Time:    16:29    Electronically signed by: Mumtaz Garsia MD  Date:    07/05/2023  Time:    17:15               Narrative:    EXAMINATION:  CTA ACUTE GI BLEED, ABDOMEN AND PELVIS    CLINICAL HISTORY:  GI bleed;    TECHNIQUE:  Initial noncontrast  images were obtained of the abdomen and pelvis.  CT axial angiography images were then obtained from the lung bases to the pubic symphysis following the intravenous administration of 100 of Omnipaque 350 with delayed images obtained per GI bleeding protocol.  Sagittal and coronal reformats were provided.    COMPARISON:  CTA GI bleed 06/13/2023    FINDINGS:  Images are degraded by significant streak and motion artifacts.    Lungs: Interval decrease in size of the small right pleural effusion with associated compressive atelectasis.  Resolution of the left pleural effusion.    Heart: Enlarged.  No pericardial effusion.    Liver: Nodular contour of the liver.  No focal abnormality.    Gallbladder: Multiple calcified gallstones.  No pericholecystic inflammatory changes.    Bile ducts: No intrahepatic or extrahepatic biliary ductal dilatation.    Spleen: Normal size.    Pancreas: No mass. No ductal dilatation. No peripancreatic fat stranding.    Adrenals: No significant abnormality    Renal/Ureters: Bilateral cortical thinning.  No hydronephrosis.  Proximal ureters are normal caliber.  The distal ureters are difficult to evaluate due to streak artifact.  Bladder is decompressed with Saldaña catheter  in place.    Reproductive: Uterus appears without significant abnormality.  No adnexal mass, noting limited evaluation due to significant streak artifact.    Stomach/Bowel: Stomach is distended with ingested contents with thickened rugal folds.  Small bowel is distended with multiple nondilated fluid-filled loops.  No transition point.  Appendix is normal.  Diffuse wall thickening throughout the colon with mild stool burden.  Diffuse wall thickening of the rectum.  No contrast extravasation to indicate active arterial bleed.    Peritoneum: Stable large left retroperitoneal hematoma measuring 11.5 x 7.8 x 12.6 cm.  No contrast extravasation to indicate active bleed within the hematoma.  Additional smaller hematoma anterior to the left iliacus muscle, which appears stable compared to prior CTA.  No free fluid. No intraperitoneal free air.    Lymph Nodes: Multiple prominent mesenteric and retroperitoneal lymph nodes.    Vasculature: Abdominal aorta tapers normally.  Mild atherosclerosis of the abdominal aorta and its branches.    Bones: Bony mineralization is diminished.  Left hip arthroplasty.  Generalized body wall edema.    Soft Tissues: No significant abnormality.                                       CT Head Without Contrast (Final result)  Result time 07/05/23 15:10:53      Final result by Viktor Desouza MD (07/05/23 15:10:53)                   Impression:      No evidence of acute intracranial pathology.    Volume loss again identified.    Electronically signed by resident: Kenney Rosas  Date:    07/05/2023  Time:    14:45    Electronically signed by: Viktor Desouza  Date:    07/05/2023  Time:    15:10               Narrative:    EXAMINATION:  CT HEAD WITHOUT CONTRAST    CLINICAL HISTORY:  Mental status change, unknown cause;    TECHNIQUE:  Low dose axial CT images obtained throughout the head without the use of intravenous contrast.  Axial, sagittal and coronal reconstructions were  performed.    COMPARISON:  CT head 06/22/2023    FINDINGS:  Intracranial compartment:    Volume loss without evidence of hydrocephalus.    No parenchymal  hemorrhage, edema, mass effect or major vascular distribution infarct.    Decreased attenuation in the periventricular white matter is nonspecific but may reflect mild chronic small vessel ischemic change vs other encephalopathy.    No extra-axial blood or fluid collections.    Skull/extracranial contents (limited evaluation):    No displaced calvarial fracture.    The visualized sinuses and mastoid air cells are clear.                                       X-Ray Chest AP Portable (Final result)  Result time 07/05/23 13:09:11      Final result by Americo Reyes MD (07/05/23 13:09:11)                   Impression:      No significant detrimental interval change in the appearance of the chest since 06/25/2023 is appreciated, with significant improvement as noted above.  No post procedure pneumothorax.      Electronically signed by: Americo Reyes MD  Date:    07/05/2023  Time:    13:09               Narrative:    EXAMINATION:  XR CHEST AP PORTABLE    TECHNIQUE:  One view was obtained.  Note is made of the fact that the thorax is obscured to some extent by opacities external to the patient, particularly defibrillator pads.    COMPARISON:  Comparison is made to the most recent prior chest radiograph of 06/25/2023.    FINDINGS:  Endotracheal tube has been placed, its tip lying just superior to the apex of the aortic arch, well above the katharina.  Left jugular origin vascular catheter is now seen, its tip in the superior vena cava near the junction of the right and left brachiocephalic veins.  Allowing for magnification of the cardiomediastinal silhouette related to projection, the heart is not significantly enlarged.  Opacity in both inferior hemithoraces seen on the previous examination has resolved, consistent with clearing of airspace consolidation in both mid/lower lung  zones and resolution of previously present bilateral pleural fluid.  Lung zones are currently clear and free of significant airspace consolidation or volume loss.  No pleural fluid of any substantial volume is seen on either side.  No pneumothorax.

## 2023-07-18 NOTE — PROGRESS NOTES
Jimmy Phillip - Telemetry [facility] Medicine  Progress Note    Patient Name: Marely Hamilton  MRN: [id number]  Patient Class: IP- Inpatient   Admission Date: 7/5/2023  Length of Stay: 13 days  Attending Physician: Seth Noyola MD  Primary Care Provider: Viktor Ross MD        Subjective:     Principal Problem:Gastrointestinal hemorrhage associated with duodenal ulcer        HPI:  Marely Hamilton is a 58 y.o. female with history of alcoholic cirrhosis, seizure disorder, DVT and IJ thrombus with recent admission for large retroperitoneal hemorrhage requiring IR embolization and IVC filter placement who presents for hematochezia. Onset this morning at [facility], alida blood per rectum per chart, sent to ED. Initially normotensive but then severe hypotension prompted initiation of levophed and intubation. Hgb 6.8, 2u pRBC given + 1u FFP ordered. Hgb 8.5 on 7/2. On levo and vaso currently. K 6.6 but renal function at baseline. Repeat pending. No prior EGDs on record.          Overview/Hospital Course:    Patient was seen at bedside, hematochezia still present post op by IR. Patient has required many transfusions of pRBCs and platelets due to ongoing down trending of Hgb. No clear source of ongoing hemorrhage by imaging. Patient has continued to require pressors to maintain MAP >65. Discussed with GI and IR regarding active bleeding post op, IR indicated there is not much else to do from their end bc they embolized a significant part of GDA. Pt is off of pressor requirement and not actively bleeding. GI re evaluated pt via EGD and found no sources of active bleeding. Pt is extubated and currently on BIPAP requirement due to de saturation in the 80's. Pt is to be seen by speech and PT/OT. Clear mucinous secretions via suction, chest physiotherapy, and cough assist device. Nebs to help with breathing as well. Pt is off BIPAP and currently on comfort flow. Triple therapy (amoxicillin, clarithromycin) started for 14 days  to treat for positive H pylori serology. Pt also had a NG tube placed so we may begin tube feedings. CxR, EKG, and ABG displayed no reason for tachycardia. Pt becomes anxious when seen by different provider teams. Remove art line and consult for midline placement. Continue to wean comfort flow as tolerated. Ensure pt is working with PT/OT/Speech for continuous improvement. Consult psych regarding patient history of bipolar disorder.         Interval History/Significant Events: Wean comfort flow as tolerated. Ensure patient is working with PT/OT/Speech for continuous improvement. Consult psych regarding psych history     7/16   history of alcoholic cirrhosis, seizure disorder, DVT and IJ thrombus with recent admission for large retroperitoneal hemorrhage requiring IR embolization and IVC filter placement who presents for hematochezia. s/p GI and IR in which they clipped (GI) and embolized (IR) possible sources of duodenal ulcer bleeding.  Hb stable  s/p extubation. sats 92% on 5L of NC.  CX ray - Detrimental changes since the previous examination including interval increase in pulmonary vascularity and bilateral diffuse interstitial pulmonary parenchymal opacification, progression of abnormal opacification at the left lung base, and development of small right-sided pleural effusion.   findings would be consistent with congestive heart failure.PT/OT recs SNF. BNP , procalcitonin and Echo.  SLP recs NPO .  On NGT feeds. psychiatry following for bipolar disorders.  Recs Zyprexa 5 mg QHS . Ammonia 48 WNL. AAOX 1. Hepatology consulted for cirrhosis. UA ordered   7/17 Saldaña removed. UA +. UC pending. started on IV ceftriaxone. ammonia at 59. on B/L UE mittens as pulled of NGT. Hepatology eval. resumed lactulose . discussed with sister and updated clinical status   7/18  MBS today  on oxygen 3L via NC.   wean  as tolerated . UC -YEAST 10,000 - 49,999 cfu/ml Identification pending No other significant isolate. hydroxyzine   PRN discontinued  due to it not being safe in delirium. oriented to person, hospital and year         Review of Systems:   Pain scale:   Constitutional:  fever,  chills, headache, vision loss, hearing loss, weight loss, Generalized weakness, falls, loss of smell, loss of taste, poor appetite,  sore throat  Respiratory: cough, shortness of breath.   Cardiovascular: chest pain, dizziness, palpitations, orthopnea, swelling of feet, syncope  Gastrointestinal: nausea, vomiting, abdominal pain, diarrhea, black stool,  blood in stool, change in bowel habits  Genitourinary: hematuria, dysuria, urgency, frequency  Integument/Breast: rash,  pruritis  Hematologic/Lymphatic: easy bruising, lymphadenopathy  Musculoskeletal: arthralgias , myalgias, back pain, neck pain, knee pain  Neurological: confusion, seizures, tremors, slurred speech  Behavioral/Psych:  depression, anxiety, auditory or visual hallucinations     OBJECTIVE:     Physical Exam:  Body mass index is 24.72 kg/m².    Constitutional: Appears well-developed and well-nourished.   Head: Normocephalic and atraumatic. + icterus  Neck: Normal range of motion. Neck supple. NGT in place   Cardiovascular: Normal heart rate.  Regular heart rhythm.  Pulmonary/Chest: Effort normal.   Abdominal: No distension.  No tenderness. foleys and rectal tube in place   Musculoskeletal: Normal range of motion. ++ edema bilateral UE and LE. UE mittens  Neurological: Alert and  oriented to person, hospital and year.  follows simple commands   Skin: Skin is warm and dry. ehcymoses on the upper chest    Psychiatric: Normal mood and affect. Behavior is normal.                  Vital Signs  Temp: 98.3 °F (36.8 °C) (07/18/23 1137)  Pulse: 108 (07/18/23 1137)  Resp: 19 (07/18/23 1137)  BP: (Abnormal) 120/59 (07/18/23 1137)  SpO2: (Abnormal) 92 % (07/18/23 1137)     24 Hour VS Range    Temp:  [98.3 °F (36.8 °C)-98.8 °F (37.1 °C)]   Pulse:  []   Resp:  [15-20]   BP: (100-136)/(52-60)   SpO2:   [90 %-100 %]     Intake/Output Summary (Last 24 hours) at 7/18/2023 1438  Last data filed at 7/18/2023 0649  Gross per 24 hour   Intake 1150 ml   Output 1300 ml   Net -150 ml         I/O This Shift:  No intake/output data recorded.    Wt Readings from Last 3 Encounters:   07/17/23 61.3 kg (135 lb 2.3 oz)   07/03/23 44.7 kg (98 lb 8.7 oz)   06/29/23 59.9 kg (132 lb 0.9 oz)       I have personally reviewed the vitals and recorded Intake/Output     Laboratory/Diagnostic Data:    CBC/Anemia Labs: Coags:    Recent Labs   Lab 07/16/23  0825 07/17/23  0619 07/18/23  0525   WBC 3.76* 3.49* 3.00*   HGB 8.0* 7.6* 7.8*   HCT 26.0* 26.0* 24.9*   PLT 41* 34* 32*   * 107* 103*   RDW 24.7* 25.2* 25.4*   FOLATE  --  11.5  --    [id number]  --  947  --     Recent Labs   Lab 07/15/23  0350 07/16/23  0451 07/17/23  0619 07/18/23  0525   INR 1.8*  --  1.9* 1.7*   APTT 28.2 33.4* 28.2 31.7        Chemistries: ABG:   Recent Labs   Lab 07/15/23  0350 07/16/23  0451 07/16/23  1201 07/17/23  0619 07/18/23  0525   * 145  --  143 142   K 3.8 4.0  --  4.1 3.9   * 118*  --  118* 118*   CO2 21* 22*  --  20* 20*   BUN 8 7  --  8 8   CREATININE 0.4* 0.4*  --  0.4* 0.4*   CALCIUM 7.9* 7.8*  --  7.9* 8.0*   PROT 4.6* 4.4*  --  4.5* 4.6*   BILITOT 3.5* 3.0*  --  3.0* 2.9*   ALKPHOS 94 124  --  143* 177*   ALT 10 11  --  11 10   AST 26 24  --  24 26   MG 1.9 1.6  --  1.7 1.7   PHOS 1.6* 2.3* 2.4*  --   --     Recent Labs   Lab 07/13/23  1044   PH 7.429   PCO2 34.5*   PO2 150*   HCO3 22.9*   POCSATURATED 99   BE -1        POCT Glucose: HbA1c:    Recent Labs   Lab 07/16/23  1133 07/17/23  0024 07/17/23  1122 07/17/23  1610 07/17/23  2359 07/18/23  1135   POCTGLUCOSE 141* 172* 140* 180* 161* 189*    Hemoglobin A1C   Date Value Ref Range Status   05/29/2023 <4.0 (A) 4.0 - 5.6 % Final     Comment:     ADA Screening Guidelines:  5.7-6.4%  Consistent with prediabetes  >or=6.5%  Consistent with diabetes    High levels of fetal hemoglobin  interfere with the HbA1C  assay. Heterozygous hemoglobin variants (HbS, HgC, etc)do  not significantly interfere with this assay.   However, presence of multiple variants may affect accuracy.  Falsely low HA1c results may be observed in patients   with clinical conditions that shorten erythrocyte   life span or decrease mean erythrocyte age.  HA1c   may not accurately reflect glycemic control when   clinical conditions that affect erythrocyte survival   are present. Fructosamine may be used as an alternate  measurement of glycemic control.     01/22/2021 4.1 4.0 - 5.6 % Final     Comment:     ADA Screening Guidelines:  5.7-6.4%  Consistent with prediabetes  >or=6.5%  Consistent with diabetes  High levels of fetal hemoglobin interfere with the HbA1C  assay. Heterozygous hemoglobin variants (HbS, HgC, etc)do  not significantly interfere with this assay.   However, presence of multiple variants may affect accuracy.     12/10/2020 4.6 4.0 - 5.6 % Final     Comment:     ADA Screening Guidelines:  5.7-6.4%  Consistent with prediabetes  >or=6.5%  Consistent with diabetes  High levels of fetal hemoglobin interfere with the HbA1C  assay. Heterozygous hemoglobin variants (HbS, HgC, etc)do  not significantly interfere with this assay.   However, presence of multiple variants may affect accuracy.          Cardiac Enzymes: Ejection Fractions:    No results for input(s): CPK, CPKMB, MB, TROPONINI in the last 72 hours. EF   Date Value Ref Range Status   07/16/2023 65 % Final   06/03/2023 70 % Final          Recent Labs   Lab 07/17/23  0111   COLORU Yellow   APPEARANCEUA Hazy*   PHUR 8.0   SPECGRAV 1.010   PROTEINUA Trace*   GLUCUA Negative   KETONESU Negative   BILIRUBINUA Negative   OCCULTUA Trace*   NITRITE Negative   LEUKOCYTESUR 3+*   RBCUA 7*   WBCUA 73*   BACTERIA Occasional   SQUAMEPITHEL 3       Procalcitonin (ng/mL)   Date Value   06/16/2023 0.26 (H)     Lactate (Lactic Acid) (mmol/L)   Date Value   07/16/2023 1.4    07/16/2023 1.4   07/06/2023 1.5   07/05/2023 8.0 (HH)   06/11/2023 6.2 (HH)     BNP (pg/mL)   Date Value   07/16/2023 174 (H)     No results found for: CRP, SEDRATE  D-Dimer (mg/L FEU)   Date Value   07/07/2023 2.22 (H)     Ferritin (ng/mL)   Date Value   05/18/2023 110   10/23/2015 412 (H)   09/19/2015 599 (H)   09/19/2015 599 (H)   07/23/2015 551 (H)   06/17/2015 612 (H)     LD (U/L)   Date Value   06/05/2023 249   05/31/2023 426 (H)   09/19/2015 280 (H)     Troponin I (ng/mL)   Date Value   06/13/2023 0.029 (H)   06/13/2023 0.030 (H)   05/18/2023 0.035 (H)   05/18/2023 0.037 (H)   05/05/2023 <0.006   01/25/2023 <0.006     CPK (U/L)   Date Value   05/05/2023 47   01/25/2023 27   06/14/2020 23 (L)     CK (U/L)   Date Value   04/15/2023 98     Results for orders placed or performed during the hospital encounter of 05/18/23   Vitamin D   Result Value Ref Range    Vit D, 25-Hydroxy 15 (L) 30 - 96 ng/mL     SARS-CoV2 (COVID-19) Qualitative PCR (no units)   Date Value   06/30/2023 Not Detected   02/14/2023 Not Detected     SARS-CoV-2 RNA, Amplification, Qual (no units)   Date Value   05/28/2023 Negative   11/02/2021 Negative   01/21/2021 Negative   01/11/2021 Negative   11/21/2020 Negative   06/16/2020 Negative       Microbiology labs for the last week  Microbiology Results (last 7 days)       Procedure Component Value Units Date/Time    Urine culture [[id number]]  (Abnormal) Collected: 07/17/23 0111    Order Status: Completed Specimen: Urine Updated: 07/17/23 2356     Urine Culture, Routine YEAST   10,000 - 49,999 cfu/ml  Identification pending  No other significant isolate      Narrative:      Specimen Source->Urine            Reviewed and noted in plan where applicable- Please see chart for full lab data.    Lines/Drains:       Peripheral IV - Single Lumen 07/12/23 1148 20 G;1 3/4 in Left Forearm (Active)   Site Assessment Clean;Dry;Intact 07/16/23 0701   Extremity Assessment Distal to IV No abnormal discoloration  07/16/23 0701   Line Status No blood return;Flushed;Saline locked 07/16/23 0701   Dressing Status Dry;Clean;Intact 07/16/23 0701   Dressing Intervention Integrity maintained 07/16/23 0701   Dressing Change Due 07/16/23 07/16/23 0701   Site Change Due 07/16/23 07/16/23 0701   Reason Not Rotated Not due 07/16/23 0701   Number of days: 3            Peripheral IV - Single Lumen 07/14/23 1540 20 G;1 3/4 in Right Upper Arm (Active)   Site Assessment Intact;Clean;Dry 07/16/23 0701   Extremity Assessment Distal to IV No abnormal discoloration 07/16/23 0701   Line Status Blood return noted;Flushed;Saline locked 07/16/23 0701   Dressing Status Dry;Intact;Clean 07/16/23 0701   Dressing Intervention Integrity maintained 07/16/23 0701   Dressing Change Due 07/18/23 07/16/23 0701   Site Change Due 07/18/23 07/16/23 0701   Reason Not Rotated Not due 07/16/23 0701   Number of days: 1            NG/OG Tube 07/13/23 1311 Screven sump 18 Fr. Right nostril (Active)   Placement Check placement verified by aspirate characteristics 07/16/23 0701   Tolerance no signs/symptoms of discomfort 07/16/23 0701   Securement secured to nostril center w/ adhesive device 07/16/23 0701   Clamp Status/Tolerance unclamped 07/16/23 0701   Suction Setting/Drainage Method suction at the bedside 07/16/23 0701   Insertion Site Appearance no redness, warmth, tenderness, skin breakdown, drainage 07/16/23 0701   Flush/Irrigation flushed w/;water 07/16/23 0502   Feeding Type continuous;by pump 07/16/23 0701   Feeding Action feeding continued 07/16/23 0701   Current Rate (mL/hr) 50 mL/hr 07/16/23 0701   Goal Rate (mL/hr) 50 mL/hr 07/16/23 0701   Intake (mL) 20 mL 07/15/23 0800   Water Bolus (mL) 250 mL 07/16/23 0501   Rate Formula Tube Feeding (mL/hr) 20 mL/hr 07/14/23 0400   Formula Name Isosource 1.5 07/16/23 0701   Intake (mL) - Formula Tube Feeding 50 07/16/23 0700   Residual Amount (ml) 30 ml 07/15/23 3093   Number of days: 2            Urethral Catheter  "07/05/23 1332 Double-lumen (Active)   Site Assessment Clean;Intact 07/16/23 0701   Collection Container Urimeter 07/16/23 0701   Securement Method secured to top of thigh w/ adhesive device 07/16/23 0701   Catheter Care Performed yes 07/16/23 0701   Reason for Continuing Urinary Catheterization Non-healing sacral/perineal wound 07/16/23 0701   CAUTI Prevention Bundle Securement Device in place with 1" slack;Intact seal between catheter & drainage tubing;Drainage bag/urimeter off the floor;Sheeting clip in use;No dependent loops or kinks;Drainage bag/urimeter not overfilled (<2/3 full);Drainage bag/urimeter below bladder 07/16/23 0701   Output (mL) 75 mL 07/16/23 0738   Number of days: 10            Fecal Incontinence  07/09/23 2300 (Active)   $ Fecal Management Supplies Fecal Management System (Supply) 07/14/23 1954   Application fecal incontinence  in place 07/16/23 0701   Drainage Method attached to drainage bag 07/16/23 0701   Securement to gravity 07/16/23 0701   Skin cleansed, skin barrier applied 07/16/23 0701   Tolerance no signs/symptoms of discomfort 07/16/23 0701   Stool (mL) 200 mL 07/16/23 0501   Number of days: 6       Imaging  ECG Results              EKG 12-lead (Final result)  Result time 07/05/23 13:56:05      Final result by Interface, Lab In [facility] (07/05/23 13:56:05)               Narrative:    Test Reason : E87.5,    Vent. Rate : 103 BPM     Atrial Rate : 103 BPM     P-R Int : 132 ms          QRS Dur : 076 ms      QT Int : 342 ms       P-R-T Axes : 073 054 084 degrees     QTc Int : 448 ms    Age and gender specific analysis  Sinus tachycardia  Low voltage QRS  Low anterior forces and R wave progression  Possibly acute STEMI    ACUTE MI / STEMI    Abnormal ECG  When compared with ECG of 05-JUL-2023 11:07,  No significant change was found  Confirmed by Abimael CLEMENTS MD ([id number]) on 7/5/2023 1:55:55 PM    Referred By: [email or web address]   SELF           Confirmed By:Abimael CLEMENTS MD               "                     EKG 12-lead (Final result)  Result time 07/05/23 14:01:06      Final result by Interface, Lab In [facility] (07/05/23 14:01:06)               Narrative:    Test Reason : K92.2,    Vent. Rate : 096 BPM     Atrial Rate : 096 BPM     P-R Int : 114 ms          QRS Dur : 066 ms      QT Int : 352 ms       P-R-T Axes : 078 090 065 degrees     QTc Int : 444 ms    Normal sinus rhythm  Vertical axis  Otherwise normal ECG  When compared with ECG of 27-JUN-2023 12:05,  T wave inversion no longer evident in Anterior leads  Confirmed by Cameron Hodge MD ([id number]) on 7/5/2023 2:01:00 PM    Referred By: System System           Confirmed By:Cameron Hodge MD                                  Results for orders placed during the hospital encounter of 05/28/23    Echo    Interpretation Summary  · The left ventricle is normal in size with hyperdynamic systolic function. The estimated ejection fraction is 70%.  · Normal right ventricular size with normal right ventricular systolic function.  · Normal left ventricular diastolic function.  · Mild left atrial enlargement.  · Mechanically ventilated; cannot use inferior caval vein diameter to estimate central venous pressure.  · Trivial pericardial effusion.  · There is a left pleural effusion.      XR NG/OG tube placement check, non-radiologist performed  Narrative: EXAMINATION:  XR NG/OG TUBE PLACEMENT CHECK, NON-RADIOLOGIST PERFORMED    CLINICAL HISTORY:  ngt placement;    TECHNIQUE:  Single radiograph centered about the upper abdomen and lower chest to evaluate enteric tube placement is submitted.    COMPARISON:  Radiograph July 14, 2023    FINDINGS:  Enteric tube is noted, the distal tip extends subdiaphragmatic overlying the upper medial left abdomen.    An IVC filter is noted.  Postprocedural change of the right abdomen noted.  Monitoring leads overlie the patient.  Evaluation of the abdomen is limited.  The bowel gas pattern is nonspecific.  The osseous structures  demonstrate chronic change.  Limited imaging of the lung bases demonstrates appearance that may relate to components of atelectasis and infiltrate as well as small pleural effusions, left greater than right.  Impression: Enteric tube is noted, the distal tip is subdiaphragmatic.    Additional findings as above.    Electronically signed by: Kenney Genao  Date:    07/16/2023  Time:    21:17  Echo  · The left ventricle is normal in size with normal systolic function.  · The estimated ejection fraction is 65-70%.  · Normal left ventricular diastolic function.  · Normal right ventricular size with normal right ventricular systolic   function.  · The estimated PA systolic pressure is at least 38 mmHg.  · The central venous pressure is at least 8 mmHg.  · Biatrial enlargement.  · There is a left pleural effusion.     X-Ray Chest 1 View  Narrative: EXAMINATION:  XR CHEST 1 VIEW    CLINICAL HISTORY:  hypoxia;    TECHNIQUE:  Single frontal portable view of the chest was performed.    COMPARISON:  July 13, 2023 at 10:38    FINDINGS:  Enteric tube has been inserted since the previous examination.  The cardiac silhouette is mildly enlarged and stable.  Pulmonary vascularity is increased, and this has progressed.  Since the previous examination, there has been slight interval progression in the bilateral increased interstitial opacification with differential considerations including edema and infection.  There is new focal opacification at the left lung base consistent with a combination of pleural fluid and atelectasis.  The right hemidiaphragm it is indistinct with blunting of the right lateral costophrenic sulcus suggesting a small amount of right-sided pleural fluid as well.  No pneumothorax.  Visualized osseous structures are stable.  Impression: Detrimental changes since the previous examination including interval increase in pulmonary vascularity and bilateral diffuse interstitial pulmonary parenchymal opacification,  progression of abnormal opacification at the left lung base, and development of small right-sided pleural effusion.  In the appropriate clinical setting, the findings would be consistent with congestive heart failure.    This report was flagged in Epic as abnormal.    Electronically signed by: Prema Nieves MD  Date:    07/16/2023  Time:    12:29      Labs, Imaging, EKG and Diagnostic results from 7/18/2023 were reviewed.    Medications:  Medication list was reviewed and changes noted under Assessment/Plan.  No current facility-administered medications on file prior to encounter.     Current Outpatient Medications on File Prior to Encounter   Medication Sig Dispense Refill    ARIPiprazole (ABILIFY) 20 MG Tab Take 5 mg by mouth once daily.      folic acid (FOLVITE) 1 MG tablet Take 1 tablet (1 mg total) by mouth once daily. (Patient taking differently: Take 1 mg by mouth every evening.) 30 tablet 2    furosemide (LASIX) 20 MG tablet Take 20 mg by mouth once daily.      lactulose (CHRONULAC) 10 gram/15 mL solution Take 10 g by mouth 3 (three) times daily.      lithium carbonate 150 MG capsule Take 1 capsule (150 mg total) by mouth every evening. (Patient taking differently: Take 150 mg by mouth 2 (two) times a day.) 30 capsule 11    magnesium oxide (MAG-OX) 400 mg (241.3 mg magnesium) tablet Take by mouth once daily.      pantoprazole (PROTONIX) 40 MG tablet Take 40 mg by mouth once daily.      propranoloL (INDERAL) 40 MG tablet Take 40 mg by mouth once daily.      QUEtiapine (SEROQUEL) 300 MG Tab Take 100 mg by mouth every evening.      sodium bicarbonate 650 MG tablet Take 650 mg by mouth Daily.      spironolactone (ALDACTONE) 50 MG tablet Take 1 tablet (50 mg total) by mouth once daily. 90 tablet 3    zonisamide (ZONEGRAN) 100 MG Cap Take 100 mg by mouth once daily.      levETIRAcetam (KEPPRA) 1000 MG tablet Take 1,000 mg by mouth 2 (two) times daily.      OLANZapine (ZYPREXA) 10 MG tablet Take 1 tablet (10 mg  total) by mouth every evening. (Patient not taking: Reported on 7/6/2023) 30 tablet 11    rifAXIMin (XIFAXAN) 550 mg Tab Take 1 tablet (550 mg total) by mouth 2 (two) times daily. (Patient not taking: Reported on 7/6/2023) 180 tablet 3     Scheduled Medications:  acetylcysteine 200 mg/ml (20%), 2 mL, Nebulization, TID WAKE  albuterol-ipratropium, 3 mL, Nebulization, Q6H WAKE  amoxicillin, 1,000 mg, Per NG tube, Q12H  cefTRIAXone (ROCEPHIN) IVPB, 1 g, Intravenous, Q24H  clarithromycin, 500 mg, Per NG tube, Q12H  lactulose, 10 g, Per NG tube, TID  levetiracetam, 1,000 mg, Per NG tube, BID  OLANZapine, 5 mg, Per NG tube, QHS  pantoprazole, 40 mg, Intravenous, BID      PRN: dextrose 10%, dextrose 10%, glucagon (human recombinant), insulin aspart U-100, sodium chloride 0.9%  Infusions:       Estimated Creatinine Clearance: 133.8 mL/min (A) (based on SCr of 0.4 mg/dL (L)).    Assessment/Plan:      * Gastrointestinal hemorrhage associated with duodenal ulcer    as  above        Dysphagia  7/18  MBS today       Hypoxia   7/16 sats 92% on 5L of NC.  CX ray - Detrimental changes since the previous examination including interval increase in pulmonary vascularity and bilateral diffuse interstitial pulmonary parenchymal opacification, progression of abnormal opacification at the left lung base, and development of small right-sided pleural effusion.   findings would be consistent with congestive heart failure.PT/OT recs SNF. BNP , procalcitonin and Echo.     H. pylori infection    positive serology for H pylori, begin triple therapy with clarithromycin,amoxicillin, and PPI for 14 days       Hematochezia    - GI EGD clipped duodenal ulcers for IR reference  - Transfuse blood products for active bleeding   - trend CBC and follow  - IR embolized GDA for duodenal hyperemia   -- GI re evaluated site of duodenal ulcers, no noted active bleeding  -- Continue PPI BID for 8 weeks, then once daily after that  --Pt had positive serology for  H pylori, begin triple therapy with clarithromycin,amoxicillin, and PPI for 14 days      Acute blood loss anemia     - Maintain IV access with 2 large bore Ivs  - Intravascular resuscitation/support with IVFs   - Hold all NSAIDs and anticoagulants, unless contraindicated.  - Please correct any coagulopathy with platelets and FFP for goal of platelets >50K and INR <2.0  - Please notify GI team if there is significant change in patient's clinical status  - To date, patient has required at least 21 units of pRBCs to maintain Hgb >7     7/16     Patient's with macrocytic anemia.. Hemoglobin stable. Etiology likely due to acute blood loss.  Current CBC reviewed-    Recent Labs   Lab 07/14/23  0752 07/15/23  0350 07/16/23  0825   HGB 8.6* 8.1* 8.0*         Component Value Date/Time     (H) 07/16/2023 0825    RDW 24.7 (H) 07/16/2023 0825    IRON 132 05/18/2023 1527    FERRITIN 110 05/18/2023 1527    RETIC 3.9 (H) 06/05/2023 0935    FOLATE 16.2 05/31/2023 1250    DZNVOWLE64 >2000 (H) 05/31/2023 1250    OCCULTBLOOD Negative 09/19/2015 0137     Monitor CBC and transfuse if H/H drops below 7/21.        Retroperitoneal bleed  Retroperitoneum: No significant adenopathy.  Similar sized left retroperitoneal hematoma measuring 11 x 9 cm (series 5, image 100; previously 11 x 9 cm).  The left iliacus collection also measures similar to prior at 5.4 cm (series 5, image 127; previously 5.6 cm).  Layering hyperdensity within these collections suggesting different ages in blood products.  No significant volume active extravasation into these collections  - CT-A demonstrated stable retroperitoneal hematoma. No need for intervention at this time.          Supratherapeutic INR  patient with INR   Recent Labs   Lab 07/15/23  0350 07/17/23  0619 07/18/23  0525   INR 1.8* 1.9* 1.7*   . INR is supratherapeutic. secondary to coagulopathy from liver disease.monitor      UTI (urinary tract infection)  7/17 Saldaña removed. UA +. UC pending.  started on IV ceftriaxone.  7/18 UC -[facility] 10,000 - 49,999 cfu/ml Identification pending No other significant isolate    Bipolar 1 disorder     - Consult psych regarding Bipolar 1 disorder history       Hepatic encephalopathy   ammonia 190s on admit -->90s . AAOX 1. no lactulose give due to GI bleed  repeat ammonia. Hepatolog eval  with     MELD 3.0: 21 at 7/17/2023  6:19 AM  MELD-Na: 18 at 7/17/2023  6:19 AM  Calculated from:  Serum Creatinine: 0.4 mg/dL (Using min of 1 mg/dL) at 7/17/2023  6:19 AM  Serum Sodium: 143 mmol/L (Using max of 137 mmol/L) at 7/17/2023  6:19 AM  Total Bilirubin: 3.0 mg/dL at 7/17/2023  6:19 AM  Serum Albumin: 2.2 g/dL at 7/17/2023  6:19 AM  INR(ratio): 1.9 at 7/17/2023  6:19 AM  Age at listing (hypothetical): 58 years  Sex: Female at 7/17/2023  6:19 AM  7/16 Ammonia 48 WNL. AAOX 1. Hepatology consulted for cirrhosis.    7/17  Hepatology eval. resumed lactulose.  7/18  hydroxyzine  PRN  discontinued  due to it not being safe in delirium    Severe protein-calorie malnutrition  Nutrition consulted. Most recent weight and BMI monitored-     Measurements:  Wt Readings from Last 1 Encounters:   07/16/23 62.7 kg (138 lb 4.4 oz)   Body mass index is 25.29 kg/m².    Patient has been screened and assessed by RD.    Malnutrition Type:  Context: social/environmental circumstances  Level: severe    Malnutrition Characteristic Summary:  Energy Intake (Malnutrition): less than or equal to 75% for greater than or equal to 1 month  Subcutaneous Fat (Malnutrition): severe depletion  Muscle Mass (Malnutrition): severe depletion      History of seizure  on Keppra via NGT       Thrombocytopenia  with cirrhosis   Patient with thrombocytopenia   Recent Labs   Lab 07/16/23  0825 07/17/23  0619 07/18/23  0525   PLT 41* 34* 32*   . Platelet counts stable  .monitor      Cirrhosis, Laennec's  alcoholic cirrhosis  MELD 3.0: 20 at 7/16/2023  4:51 AM  MELD-Na: 17 at 7/16/2023  4:51 AM  Calculated from:  Serum  Creatinine: 0.4 mg/dL (Using min of 1 mg/dL) at 7/16/2023  4:51 AM  Serum Sodium: 145 mmol/L (Using max of 137 mmol/L) at 7/16/2023  4:51 AM  Total Bilirubin: 3.0 mg/dL at 7/16/2023  4:51 AM  Serum Albumin: 2.3 g/dL at 7/16/2023  4:51 AM  INR(ratio): 1.8 at 7/15/2023  3:50 AM  Age at listing (hypothetical): 58 years  Sex: Female at 7/16/2023  4:51 AM     Recent Labs   Lab 07/16/23  1201   AMMONIA 48     Strict input /output monitor, daily weights        VTE Risk Mitigation (From admission, onward)           Ordered     Place sequential compression device  Until discontinued         07/14/23 1024     Reason for No Pharmacological VTE Prophylaxis  Once        Question:  Reasons:  Answer:  Active Bleeding    07/05/23 1334     IP VTE HIGH RISK PATIENT  Once         07/05/23 1334                    Discharge Planning   BRAYAN: 7/21/2023     Code Status: DNR   Is the patient medically ready for discharge?: No    Reason for patient still in hospital (select all that apply): Treatment  Discharge Plan A: Skilled Nursing Facility   Discharge Delays: None known at this time              Seth Noyola MD  Department of Hospital Medicine   [facility] - Telemetry Stepdown

## 2023-07-18 NOTE — PT/OT/SLP PROGRESS
Physical Therapy      Patient Name:  Marely Hamilton   MRN:  [id number]    Patient not seen today secondary to Patient fatigue. Attempt at 15:34, patient was napping and reported fatigue. Writer educated patient on importance of participation in therapy and mobility. Patient politely refused.  Will follow-up 7/19.

## 2023-07-18 NOTE — PROCEDURES
Modified Barium Swallow    Patient Name:  Marely Hamilton   MRN:  [id number]      Recommendations:      Patient demonstrates mild Oropharyngeal  dysphagia characterized by high risk for aspiration with thin liquids and residue with purees. Cognitive status and impulsivity also increase pt aspiration risk during meals. Safest diet at this time appears nectar thickened liquids and purees, with meds crushed in puree.     Recommendations:                General Recommendations:  Dysphagia therapy and Cognitive-linguistic therapy  Diet recommendations:  Puree Diet - IDDSI Level 4, Mildly thick/Nectar thick liquids - IDDSI Level 2   Aspiration Precautions: 1 bite/sip at a time, Assistance with meals and Assistance with thickening liquids, Eliminate distractions, Feed only when awake/alert, Meds crushed in puree, No straws, and Standard aspiration precautions   General Precautions: Standard, aspiration, fall  Communication strategies:  none    Referral     Reason for Referral  Patient was referred for a Modified Barium Swallow Study to assess the efficiency of his/her swallow function, rule out aspiration and make recommendations regarding safe dietary consistencies, effective compensatory strategies, and safe eating environment.     Diagnosis: Gastrointestinal hemorrhage associated with duodenal ulcer       History:     Past Medical History:   Diagnosis Date    Addiction to drug     Alcohol abuse     Alcohol abuse, in remission 6/15/2015    14.5 weeks ago; AA weekly    Anemia     Anxiety 6/15/2015    Behavioral problem     Bipolar disorder     Bipolar disorder in remission 6/15/2015    Cirrhosis, Laennec's 6/15/2015    Depression     Encounter for blood transfusion     Epistaxis 6/15/2015    Fatigue     Glaucoma     Hematuria     Hepatic encephalopathy 6/15/2015    Hepatic enlargement     History of psychiatric care     History of psychiatric hospitalization     History of seizure 6/15/2015    1    hx of intentional Tylenol  overdose 6/15/2015    2005- situational and hx of bipolar    Hx of psychiatric care     Macrocytic anemia 9/18/2015    6 units PRBC since June 2015    Jeana     Osteoarthritis     Other ascites 6/15/2015    Psychiatric exam requested by authority     Psychiatric problem     Psychosis 9/26/2019    Renal disorder     Seizures     Self-harming behavior     Suicide attempt     Therapy     Thrombocytopenia 6/15/2015       Objective:     Current Respiratory Status: 07/18/23    Alert: yes    Cooperative: yes    Follows Directions: inconsistent    Visualization  Patient was seen in the lateral view  NG tube observed in place    Oral Peripheral Examination  Oral Musculature: general weakness  Dentition: present and adequate  Secretion Management: adequate  Mucosal Quality: dry, sticky  Oral Labial Strength and Mobility: functional seal  Lingual Strength and Mobility: functional protrusion, functional anterior elevation  Voice Prior to PO Intake: moderately dysphonic- hoarse, rbeathy, low volume though improving slightly as sesison progressed    Consistencies Assessed  Thin 90mL teaspoon, open cup and straw    Nectar thick 90 mL open via straw   Puree full tsp x3  Solids deferred   Pt with wet vocal quality with secretions prior to any PO intake    Oral Preparation/Oral Phase  Impulsive very large boluses of thin liquids  Prolonged bolus prep with purees   Solids deferred given pt impulsivity with liquids trials and decreased ability to follow directions     Pharyngeal Phase   General function across consistencies:   Essentially timely swallow initiation   Pt with mildly reduced hyolaryngeal elevation and excursion   Pt with moderately decreased base of tongue retraction   Decreased pharyngeal peristalsis   Intermittent throat clearing without evidence of barium contrast in laryngeal vestibule     TSolid:  Pt without penetration and or aspiration  There was presence of SEVERE valleculae stasis, Pt unable to clear residue with  second swallows  Stasis ultimately cleared with Nectar thick liquid was x3        Pharyngeal Phase   Pt mild swallow delay with trigger at the level of the valleculae and pyriform sinuses pending consistency    Pt with reduced BOT retraction  Pt with reduced hyolaryngeal elevation and excursion patterns  Epiglottic atrophy observed     Thin Liquids:  Pt with aspiration on initial teaspoon of water; reliable cough response but weak and unable to eject material from laryngeal vestibule   Pt also with penetration during the swallow of consecutive straw sips; Penetrated material observed to rest upon vocal folds with high aspiration risk/likelihood over time  Pt warranted max cues to follow SLP commands to throat clear/cough which did ultimately eject material from laryngeal vestibule  With return to small single sips airway protection is adequate however pt unable to reliably comply   High aspiration risk given impulsivity and fluctuating cognition   Additional strategies not attempted given cognitive status      Nectar Thick Liquids:   Pt without penetration and or aspiration with single sips/small bolus via open cup and straw  Mild/trace pharyngeal residue present     Puree:   Pt without penetration and or aspiration  There was evidence of mild residue which did essentially clear with SLP cued second dry swallow; however, Pt warranted MAX cues to initiate a second swallow which was delayed in nature and unreliable given cognition     Solids- deferred given level of swallow impairment and cognition        Cervical Esophageal Phase  UES appeared to accommodate all bolus types without stasis or retrograde movement observed     Assessment:     Impressions    Patient demonstrates mild Oropharyngeal  dysphagia characterized by high risk for aspiration with thin liquids and residue with purees. Cognitive status and impulsivity also increase pt aspiration risk during meals. Safest diet at this time appears nectar thickened  liquids and purees, with meds crushed in puree. .     Prognosis: Fair    Barriers:  Cognitive status  Poor intake  Dependent feeding    Plan  Speech to follow to ensure diet tolerance     Education  Results were discussed with patient. Ongoing reinforcement of safe swallow strategies warranted.     Goals:   Multidisciplinary Problems       SLP Goals          Problem: SLP    Goal Priority Disciplines Outcome   SLP Goal     SLP Ongoing, Progressing   Description: Speech Pathology Goals  To be met by 7/27/23    1. Pt will participate in ongoing diagnostic dysphagia therapy                              Plan:   Patient to be seen:  Therapy Frequency: 4 x/week   Plan of Care expires:  08/12/23  Plan of Care reviewed with:  patient        Discharge recommendations:   (TBD)   Barriers to Discharge:  Level of Skilled Assistance Needed      Time Tracking:   SLP Treatment Date:   07/18/23  Speech Start Time:  1350  Speech Stop Time:  1408     Speech Total Time (min):  18 min    07/18/2023

## 2023-07-18 NOTE — SUBJECTIVE & OBJECTIVE
Scheduled Meds:   acetylcysteine 200 mg/ml (20%)  2 mL Nebulization TID WAKE    albuterol-ipratropium  3 mL Nebulization Q6H WAKE    amoxicillin  1,000 mg Per NG tube Q12H    cefTRIAXone (ROCEPHIN) IVPB  1 g Intravenous Q24H    clarithromycin  500 mg Per NG tube Q12H    lactulose  10 g Per NG tube TID    levetiracetam  1,000 mg Per NG tube BID    OLANZapine  5 mg Per NG tube QHS    pantoprazole  40 mg Intravenous BID     Continuous Infusions:  PRN Meds:dextrose 10%, dextrose 10%, glucagon (human recombinant), insulin aspart U-100, sodium chloride 0.9%    Review of patient's allergies indicates:   Allergen Reactions    Sulfa (sulfonamide antibiotics) Rash    Codeine Nausea And Vomiting        Past Medical History:   Diagnosis Date    Addiction to drug     Alcohol abuse     Alcohol abuse, in remission 6/15/2015    14.5 weeks ago; AA weekly    Anemia     Anxiety 6/15/2015    Behavioral problem     Bipolar disorder     Bipolar disorder in remission 6/15/2015    Cirrhosis, Laenn's 6/15/2015    Depression     Encounter for blood transfusion     Epistaxis 6/15/2015    Fatigue     Glaucoma     Hematuria     Hepatic encephalopathy 6/15/2015    Hepatic enlargement     History of psychiatric care     History of psychiatric hospitalization     History of seizure 6/15/2015    1    hx of intentional Tylenol overdose 6/15/2015    2005- situational and hx of bipolar    Hx of psychiatric care     Macrocytic anemia 9/18/2015    6 units PRBC since June 2015    Jeana     Osteoarthritis     Other ascites 6/15/2015    Psychiatric exam requested by authority     Psychiatric problem     Psychosis 9/26/2019    Renal disorder     Seizures     Self-harming behavior     Suicide attempt     Therapy     Thrombocytopenia 6/15/2015     Past Surgical History:   Procedure Laterality Date    COSMETIC SURGERY      EMBOLIZATION N/A 6/11/2023    Procedure: EMBOLIZATION, BLOOD VESSEL;  Surgeon: Debbie Surgeon;  Location: [facility];  Service:  Anesthesiology;  Laterality: N/A;    ESOPHAGOGASTRODUODENOSCOPY      ESOPHAGOGASTRODUODENOSCOPY N/A 7/6/2023    Procedure: EGD (ESOPHAGOGASTRODUODENOSCOPY);  Surgeon: Bernice Guillen MD;  Location: [facility] ([address] [place] [address]);  Service: Endoscopy;  Laterality: N/A;    ESOPHAGOGASTRODUODENOSCOPY N/A 7/11/2023    Procedure: EGD (ESOPHAGOGASTRODUODENOSCOPY);  Surgeon: Rangel Broussard MD;  [facility]  Service: Endoscopy;  Laterality: N/A;       Family History       Problem Relation (Age of Onset)    Alcohol abuse Father, Sister, Paternal Grandfather    Bipolar disorder Father    Hypertension Mother    Suicide Father          Tobacco Use    Smoking status: Never    Smokeless tobacco: Never   Substance and Sexual Activity    Alcohol use: Not Currently     Comment: hx of ETOH abuse with cirrhosis of liver    Drug use: No    Sexual activity: Not Currently     Review of Systems   Skin:  Positive for wound.     Objective:     Vital Signs (Most Recent):  Temp: 98.3 °F (36.8 °C) (07/18/23 1137)  Pulse: 108 (07/18/23 1137)  Resp: 19 (07/18/23 1137)  BP: (!) 120/59 (07/18/23 1137)  SpO2: (!) 92 % (07/18/23 1137) Vital Signs (24h Range):  Temp:  [98.3 °F (36.8 °C)-98.8 °F (37.1 °C)] 98.3 °F (36.8 °C)  Pulse:  [] 108  Resp:  [15-20] 19  SpO2:  [90 %-100 %] 92 %  BP: (100-136)/(52-60) 120/59     Weight: 61.3 kg (135 lb 2.3 oz)  Body mass index is 24.72 kg/m².     Physical Exam  Constitutional:       Appearance: Normal appearance.   Skin:     General: Skin is warm and dry.      Findings: Lesion present.   Neurological:      Mental Status: She is alert.        Laboratory:  All pertinent labs reviewed within the last 24 hours.    Diagnostic Results:  None

## 2023-07-18 NOTE — PT/OT/SLP PROGRESS
Occupational Therapy   Treatment    Name: Marely Hamilton  MRN: [id number]  Admitting Diagnosis:  Gastrointestinal hemorrhage associated with duodenal ulcer  7 Days Post-Op    Recommendations:     Discharge Recommendations: nursing facility, skilled  Discharge Equipment Recommendations:  to be determined by next level of care  Barriers to discharge:   Decreased caregiver support and increased need for physical assist with self care and functional mobility    Assessment:     Marely Hamilton is a 58 y.o. female with a medical diagnosis of Gastrointestinal hemorrhage associated with duodenal ulcer.  She presents with improved communication and cognition but continues to fatigue quickly moving to edge of bed. Pt able to complete treatment session with no restraints and did not pull at lines. Pt also able to remember two songs that she likes, but became teary and a little emotional when listening to them.  Pt responded well to emotional support and extra time to communicate.   Performance deficits affecting function are weakness, impaired endurance, impaired sensation, impaired self care skills, impaired functional mobility, impaired balance, impaired cognition, decreased upper extremity function, decreased lower extremity function, decreased safety awareness, decreased coordination, impaired coordination, decreased ROM, impaired fine motor, edema, impaired cardiopulmonary response to activity.     Rehab Prognosis:  Fair; patient would benefit from acute skilled OT services to address these deficits and reach maximum level of function.       Plan:     Patient to be seen 3 x/week to address the above listed problems via self-care/home management, therapeutic activities, therapeutic exercises  Plan of Care Expires: 08/11/23  Plan of Care Reviewed with: patient    Subjective     Chief Complaint: Pt reported that she her eyes are itchy and burn  Patient/Family Comments/goals: Pt reported that she like music (Pink demi and  Kacie Gar)  Pain/Comfort:  Pain Rating 1:  (pt did not rate)  Location - Side 1: Bilateral  Location - Orientation 1: generalized  Location 1: eye  Pain Addressed 1: Distraction, Nurse notified, Other (see comments) (used cold wash cloth)  Pain Rating Post-Intervention 1:  (pt reported burning and stinging)    Objective:     Communicated with: Nurse prior to session.  Patient found supine with arterial line, blood pressure cuff, bowel management system, external pacer, NG tube, telemetry, pulse ox (continuous), PRAFO, restraints upon OT entry to room.    General Precautions: Standard, aspiration, fall    Orthopedic Precautions:N/A  Braces: N/A       Occupational Performance:     Bed Mobility:    Patient completed Supine to Sit with maximal assistance     Functional Mobility/Transfers:  Functional Mobility: Pt sat edge of bed for ~ 5 min with SBA to improve core strength for functional mobility, then returned to supine 2/2 to fatigue     Activities of Daily Living:  Grooming: total assistance OT set up wash cloth in pt's right hand (dominant) but unable to bring to face.   Bathing: total assistance upper body sponge bath       [facility] 6 Click ADL: 6    Treatment & Education:  - Pt received deep pressure (tactile input to improve Hand ROM) finger crawls x 3 proximally (edema noted). Pt able to grasp 2 of OT's finger demonstrating weak power grasp and also difficulty with flexing index fingers of both hands.   - Pt able to grasp light yellow resistance band and complete chest pulls x 10 reps while supine in bed, head of bed elevated, following oT demonstration.    -Education on importance of OOB activity to improve overall activity tolerance and promote recovery  -Pt educated to call for assistance and to transfer with hospital staff only  -Provided education regarding role of OT, POC, & discharge recommendations with pt  verbalizing understanding.  Pt had no further questions & when asked whether there were any  concerns pt reported none.     Patient left supine with mitten restraints on (per nursing - 2/2 to lines being pulled) all lines intact, call button in reach, and nurse notified    GOALS:   Multidisciplinary Problems       Occupational Therapy Goals          Problem: Occupational Therapy    Goal Priority Disciplines Outcome Interventions   Occupational Therapy Goal     OT, PT/OT Ongoing, Progressing    Description: Goals to be met by: 7/28/2023     Patient will increase functional independence with ADLs by performing:    UE Dressing with Minimal Assistance.  LE Dressing with Moderate Assistance.  Grooming while EOB with Minimal Assistance.  Toileting from bedside commode with Maximum Assistance for hygiene and clothing management.   Supine to sit with Moderate Assistance.  Stand pivot transfers with Maximum Assistance with or without AD.  Toilet transfer to bedside commode with Maximum Assistance with or without AD.                         Time Tracking:     OT Date of Treatment: 07/18/23  OT Start Time: 1118  OT Stop Time: 1215  OT Total Time (min): 57 min    Billable Minutes:Self Care/Home Management 10  Therapeutic Activity 35  Neuromuscular Re-education 12    OT/DC: OT          7/18/2023

## 2023-07-19 LAB
ALBUMIN SERPL BCP-MCNC: 2.2 G/DL (ref 3.5–5.2)
ALP SERPL-CCNC: 207 U/L (ref 55–135)
ALT SERPL W/O P-5'-P-CCNC: 11 U/L (ref 10–44)
ANION GAP SERPL CALC-SCNC: 5 MMOL/L (ref 8–16)
ANISOCYTOSIS BLD QL SMEAR: SLIGHT
APTT PPP: 33.8 SEC (ref 21–32)
AST SERPL-CCNC: 26 U/L (ref 10–40)
BACTERIA UR CULT: ABNORMAL
BASOPHILS # BLD AUTO: 0.01 K/UL (ref 0–0.2)
BASOPHILS NFR BLD: 0.3 % (ref 0–1.9)
BILIRUB SERPL-MCNC: 2.6 MG/DL (ref 0.1–1)
BUN SERPL-MCNC: 8 MG/DL (ref 6–20)
BURR CELLS BLD QL SMEAR: ABNORMAL
CA-I BLDV-SCNC: 1.11 MMOL/L (ref 1.06–1.42)
CA-I BLDV-SCNC: 1.21 MMOL/L (ref 1.06–1.42)
CALCIUM SERPL-MCNC: 7.8 MG/DL (ref 8.7–10.5)
CHLORIDE SERPL-SCNC: 116 MMOL/L (ref 95–110)
CO2 SERPL-SCNC: 19 MMOL/L (ref 23–29)
CREAT SERPL-MCNC: 0.4 MG/DL (ref 0.5–1.4)
DACRYOCYTES BLD QL SMEAR: ABNORMAL
DIFFERENTIAL METHOD: ABNORMAL
EOSINOPHIL # BLD AUTO: 0.1 K/UL (ref 0–0.5)
EOSINOPHIL NFR BLD: 2.9 % (ref 0–8)
ERYTHROCYTE [DISTWIDTH] IN BLOOD BY AUTOMATED COUNT: 25.2 % (ref 11.5–14.5)
EST. GFR  (NO RACE VARIABLE): >60 ML/MIN/1.73 M^2
GLUCOSE SERPL-MCNC: 189 MG/DL (ref 70–110)
HCT VFR BLD AUTO: 26.2 % (ref 37–48.5)
HGB BLD-MCNC: 8 G/DL (ref 12–16)
IMM GRANULOCYTES # BLD AUTO: 0.01 K/UL (ref 0–0.04)
IMM GRANULOCYTES NFR BLD AUTO: 0.3 % (ref 0–0.5)
INR PPP: 1.6 (ref 0.8–1.2)
LYMPHOCYTES # BLD AUTO: 0.4 K/UL (ref 1–4.8)
LYMPHOCYTES NFR BLD: 12 % (ref 18–48)
MAGNESIUM SERPL-MCNC: 1.6 MG/DL (ref 1.6–2.6)
MCH RBC QN AUTO: 31.9 PG (ref 27–31)
MCHC RBC AUTO-ENTMCNC: 30.5 G/DL (ref 32–36)
MCV RBC AUTO: 104 FL (ref 82–98)
MONOCYTES # BLD AUTO: 0.3 K/UL (ref 0.3–1)
MONOCYTES NFR BLD: 9.7 % (ref 4–15)
NEUTROPHILS # BLD AUTO: 2.3 K/UL (ref 1.8–7.7)
NEUTROPHILS NFR BLD: 74.8 % (ref 38–73)
NRBC BLD-RTO: 0 /100 WBC
OVALOCYTES BLD QL SMEAR: ABNORMAL
PLATELET # BLD AUTO: 34 K/UL (ref 150–450)
PLATELET BLD QL SMEAR: ABNORMAL
PMV BLD AUTO: 11.2 FL (ref 9.2–12.9)
POCT GLUCOSE: 157 MG/DL (ref 70–110)
POCT GLUCOSE: 184 MG/DL (ref 70–110)
POCT GLUCOSE: 186 MG/DL (ref 70–110)
POCT GLUCOSE: 222 MG/DL (ref 70–110)
POIKILOCYTOSIS BLD QL SMEAR: SLIGHT
POTASSIUM SERPL-SCNC: 4 MMOL/L (ref 3.5–5.1)
PROT SERPL-MCNC: 4.7 G/DL (ref 6–8.4)
PROTHROMBIN TIME: 16.3 SEC (ref 9–12.5)
RBC # BLD AUTO: 2.51 M/UL (ref 4–5.4)
SODIUM SERPL-SCNC: 140 MMOL/L (ref 136–145)
WBC # BLD AUTO: 3.09 K/UL (ref 3.9–12.7)

## 2023-07-19 PROCEDURE — 63600175 PHARM REV CODE 636 W HCPCS: Performed by: STUDENT IN AN ORGANIZED HEALTH CARE EDUCATION/TRAINING PROGRAM

## 2023-07-19 PROCEDURE — 99233 PR SUBSEQUENT HOSPITAL CARE,LEVL III: ICD-10-PCS | Mod: ,,, | Performed by: HOSPITALIST

## 2023-07-19 PROCEDURE — 99233 PR SUBSEQUENT HOSPITAL CARE,LEVL III: ICD-10-PCS | Mod: GC,,, | Performed by: PSYCHIATRY & NEUROLOGY

## 2023-07-19 PROCEDURE — 97535 SELF CARE MNGMENT TRAINING: CPT

## 2023-07-19 PROCEDURE — 36415 COLL VENOUS BLD VENIPUNCTURE: CPT

## 2023-07-19 PROCEDURE — 94668 MNPJ CHEST WALL SBSQ: CPT

## 2023-07-19 PROCEDURE — 99233 SBSQ HOSP IP/OBS HIGH 50: CPT | Mod: ,,, | Performed by: HOSPITALIST

## 2023-07-19 PROCEDURE — 92526 ORAL FUNCTION THERAPY: CPT

## 2023-07-19 PROCEDURE — 25000242 PHARM REV CODE 250 ALT 637 W/ HCPCS: Performed by: HOSPITALIST

## 2023-07-19 PROCEDURE — 99900035 HC TECH TIME PER 15 MIN (STAT)

## 2023-07-19 PROCEDURE — 20600001 HC STEP DOWN PRIVATE ROOM

## 2023-07-19 PROCEDURE — 94640 AIRWAY INHALATION TREATMENT: CPT

## 2023-07-19 PROCEDURE — C9113 INJ PANTOPRAZOLE SODIUM, VIA: HCPCS | Performed by: STUDENT IN AN ORGANIZED HEALTH CARE EDUCATION/TRAINING PROGRAM

## 2023-07-19 PROCEDURE — 99233 SBSQ HOSP IP/OBS HIGH 50: CPT | Mod: GC,,, | Performed by: PSYCHIATRY & NEUROLOGY

## 2023-07-19 PROCEDURE — 63600175 PHARM REV CODE 636 W HCPCS: Performed by: HOSPITALIST

## 2023-07-19 PROCEDURE — 94761 N-INVAS EAR/PLS OXIMETRY MLT: CPT

## 2023-07-19 PROCEDURE — 85610 PROTHROMBIN TIME: CPT | Performed by: HOSPITALIST

## 2023-07-19 PROCEDURE — 25000003 PHARM REV CODE 250: Performed by: HOSPITALIST

## 2023-07-19 PROCEDURE — 80053 COMPREHEN METABOLIC PANEL: CPT

## 2023-07-19 PROCEDURE — 82330 ASSAY OF CALCIUM: CPT | Mod: 91

## 2023-07-19 PROCEDURE — 25000003 PHARM REV CODE 250

## 2023-07-19 PROCEDURE — 83735 ASSAY OF MAGNESIUM: CPT

## 2023-07-19 PROCEDURE — 85730 THROMBOPLASTIN TIME PARTIAL: CPT

## 2023-07-19 PROCEDURE — 85025 COMPLETE CBC W/AUTO DIFF WBC: CPT

## 2023-07-19 PROCEDURE — 97530 THERAPEUTIC ACTIVITIES: CPT

## 2023-07-19 PROCEDURE — 97112 NEUROMUSCULAR REEDUCATION: CPT

## 2023-07-19 PROCEDURE — 27000221 HC OXYGEN, UP TO 24 HOURS

## 2023-07-19 RX ADMIN — CEFTRIAXONE 1 G: 1 INJECTION, POWDER, FOR SOLUTION INTRAMUSCULAR; INTRAVENOUS at 10:07

## 2023-07-19 RX ADMIN — LEVETIRACETAM 1000 MG: 500 SOLUTION ORAL at 09:07

## 2023-07-19 RX ADMIN — AMOXICILLIN 1000 MG: 500 CAPSULE ORAL at 11:07

## 2023-07-19 RX ADMIN — ACETYLCYSTEINE 2 ML: 200 SOLUTION ORAL; RESPIRATORY (INHALATION) at 08:07

## 2023-07-19 RX ADMIN — IPRATROPIUM BROMIDE AND ALBUTEROL SULFATE 3 ML: 2.5; .5 SOLUTION RESPIRATORY (INHALATION) at 08:07

## 2023-07-19 RX ADMIN — INSULIN ASPART 1 UNITS: 100 INJECTION, SOLUTION INTRAVENOUS; SUBCUTANEOUS at 12:07

## 2023-07-19 RX ADMIN — AMOXICILLIN 1000 MG: 500 CAPSULE ORAL at 09:07

## 2023-07-19 RX ADMIN — LACTULOSE 10 G: 20 SOLUTION ORAL at 09:07

## 2023-07-19 RX ADMIN — IPRATROPIUM BROMIDE AND ALBUTEROL SULFATE 3 ML: 2.5; .5 SOLUTION RESPIRATORY (INHALATION) at 02:07

## 2023-07-19 RX ADMIN — LACTULOSE 10 G: 20 SOLUTION ORAL at 03:07

## 2023-07-19 RX ADMIN — PANTOPRAZOLE SODIUM 40 MG: 40 INJECTION, POWDER, FOR SOLUTION INTRAVENOUS at 09:07

## 2023-07-19 RX ADMIN — ACETYLCYSTEINE 2 ML: 200 SOLUTION ORAL; RESPIRATORY (INHALATION) at 02:07

## 2023-07-19 RX ADMIN — INSULIN ASPART 4 UNITS: 100 INJECTION, SOLUTION INTRAVENOUS; SUBCUTANEOUS at 12:07

## 2023-07-19 RX ADMIN — CLARITHROMYCIN 500 MG: 500 TABLET, FILM COATED ORAL at 09:07

## 2023-07-19 RX ADMIN — INSULIN ASPART 2 UNITS: 100 INJECTION, SOLUTION INTRAVENOUS; SUBCUTANEOUS at 06:07

## 2023-07-19 RX ADMIN — CLARITHROMYCIN 500 MG: 500 TABLET, FILM COATED ORAL at 11:07

## 2023-07-19 NOTE — PROGRESS NOTES
Jimmy Phillip - Telemetry [facility] Medicine  Progress Note    Patient Name: Marely Hamilton  MRN: [id number]  Patient Class: IP- Inpatient   Admission Date: 7/5/2023  Length of Stay: 14 days  Attending Physician: Seth Noyola MD  Primary Care Provider: Viktor Ross MD        Subjective:     Principal Problem:Gastrointestinal hemorrhage associated with duodenal ulcer        HPI:  Marely Hamilton is a 58 y.o. female with history of alcoholic cirrhosis, seizure disorder, DVT and IJ thrombus with recent admission for large retroperitoneal hemorrhage requiring IR embolization and IVC filter placement who presents for hematochezia. Onset this morning at [facility], alida blood per rectum per chart, sent to ED. Initially normotensive but then severe hypotension prompted initiation of levophed and intubation. Hgb 6.8, 2u pRBC given + 1u FFP ordered. Hgb 8.5 on 7/2. On levo and vaso currently. K 6.6 but renal function at baseline. Repeat pending. No prior EGDs on record.          Overview/Hospital Course:    Patient was seen at bedside, hematochezia still present post op by IR. Patient has required many transfusions of pRBCs and platelets due to ongoing down trending of Hgb. No clear source of ongoing hemorrhage by imaging. Patient has continued to require pressors to maintain MAP >65. Discussed with GI and IR regarding active bleeding post op, IR indicated there is not much else to do from their end bc they embolized a significant part of GDA. Pt is off of pressor requirement and not actively bleeding. GI re evaluated pt via EGD and found no sources of active bleeding. Pt is extubated and currently on BIPAP requirement due to de saturation in the 80's. Pt is to be seen by speech and PT/OT. Clear mucinous secretions via suction, chest physiotherapy, and cough assist device. Nebs to help with breathing as well. Pt is off BIPAP and currently on comfort flow. Triple therapy (amoxicillin, clarithromycin) started for 14 days  to treat for positive H pylori serology. Pt also had a NG tube placed so we may begin tube feedings. CxR, EKG, and ABG displayed no reason for tachycardia. Pt becomes anxious when seen by different provider teams. Remove art line and consult for midline placement. Continue to wean comfort flow as tolerated. Ensure pt is working with PT/OT/Speech for continuous improvement. Consult psych regarding patient history of bipolar disorder.         Interval History/Significant Events: Wean comfort flow as tolerated. Ensure patient is working with PT/OT/Speech for continuous improvement. Consult psych regarding psych history     7/16   history of alcoholic cirrhosis, seizure disorder, DVT and IJ thrombus with recent admission for large retroperitoneal hemorrhage requiring IR embolization and IVC filter placement who presents for hematochezia. s/p GI and IR in which they clipped (GI) and embolized (IR) possible sources of duodenal ulcer bleeding.  Hb stable  s/p extubation. sats 92% on 5L of NC.  CX ray - Detrimental changes since the previous examination including interval increase in pulmonary vascularity and bilateral diffuse interstitial pulmonary parenchymal opacification, progression of abnormal opacification at the left lung base, and development of small right-sided pleural effusion.   findings would be consistent with congestive heart failure.PT/OT recs SNF. BNP , procalcitonin and Echo.  SLP recs NPO .  On NGT feeds. psychiatry following for bipolar disorders.  Recs Zyprexa 5 mg QHS . Ammonia 48 WNL. AAOX 1. Hepatology consulted for cirrhosis. UA ordered   7/17 Saldaña removed. UA +. UC pending. started on IV ceftriaxone. ammonia at 59. on B/L UE mittens as pulled of NGT. Hepatology eval. resumed lactulose . discussed with sister and updated clinical status   7/18  MBS today - started puree nectar thick liquids.  on oxygen 3L via NC.   wean  as tolerated . UC -YEAST 10,000 - 49,999 cfu/ml Identification pending No  other significant isolate. hydroxyzine  PRN discontinued  due to it not being safe in delirium. oriented to person, hospital and year   7/19 SLP recs - tolerating Puree Diet - IDDSI Level 4, Mildly thick/Nectar thick liquids - IDDSI Level 2 .stool output 600ml/ monitor on lactulose . improved mentation AAOX 3        Review of Systems:   Pain scale:   Constitutional:  fever,  chills, headache, vision loss, hearing loss, weight loss, Generalized weakness, falls, loss of smell, loss of taste, poor appetite,  sore throat  Respiratory: cough, shortness of breath.   Cardiovascular: chest pain, dizziness, palpitations, orthopnea, swelling of feet, syncope  Gastrointestinal: nausea, vomiting, abdominal pain, diarrhea, black stool,  blood in stool, change in bowel habits  Genitourinary: hematuria, dysuria, urgency, frequency  Integument/Breast: rash,  pruritis  Hematologic/Lymphatic: easy bruising, lymphadenopathy  Musculoskeletal: arthralgias , myalgias, back pain, neck pain, knee pain  Neurological: confusion, seizures, tremors, slurred speech  Behavioral/Psych:  depression, anxiety, auditory or visual hallucinations     OBJECTIVE:     Physical Exam:  Body mass index is 24.72 kg/m².    Constitutional: Appears well-developed and well-nourished.   Head: Normocephalic and atraumatic. + icterus  Neck: Normal range of motion. Neck supple. NGT in place   Cardiovascular: Normal heart rate.  Regular heart rhythm.  Pulmonary/Chest: Effort normal.   Abdominal: No distension.  No tenderness.  rectal tube in place   Musculoskeletal: Normal range of motion. ++ edema bilateral UE and LE. UE mittens  Neurological: Alert and  oriented to person, hospital and year.  follows simple commands   Skin: Skin is warm and dry. ehcymoses on the upper chest    Psychiatric: Normal mood and affect. Behavior is normal.                  Vital Signs  Temp: 98.9 °F (37.2 °C) (07/19/23 1133)  Pulse: 85 (07/19/23 1154)  Resp: 19 (07/19/23 1133)  BP: 130/62  (07/19/23 1133)  SpO2: (Abnormal) 93 % (07/19/23 1133)     24 Hour VS Range    Temp:  [97.7 °F (36.5 °C)-99.4 °F (37.4 °C)]   Pulse:  []   Resp:  [15-20]   BP: (109-139)/(53-72)   SpO2:  [93 %-98 %]     Intake/Output Summary (Last 24 hours) at 7/19/2023 1238  Last data filed at 7/19/2023 0525  Gross per 24 hour   Intake 1040 ml   Output 600 ml   Net 440 ml         I/O This Shift:  No intake/output data recorded.    Wt Readings from Last 3 Encounters:   07/17/23 61.3 kg (135 lb 2.3 oz)   07/03/23 44.7 kg (98 lb 8.7 oz)   06/29/23 59.9 kg (132 lb 0.9 oz)       I have personally reviewed the vitals and recorded Intake/Output     Laboratory/Diagnostic Data:    CBC/Anemia Labs: Coags:    Recent Labs   Lab 07/17/23  0619 07/18/23 0525 07/19/23  0350   WBC 3.49* 3.00* 3.09*   HGB 7.6* 7.8* 8.0*   HCT 26.0* 24.9* 26.2*   PLT 34* 32* 34*   * 103* 104*   RDW 25.2* 25.4* 25.2*   FOLATE 11.5  --   --    [id number] 947  --   --     Recent Labs   Lab 07/17/23 0619 07/18/23 0525 07/19/23  0350   INR 1.9* 1.7* 1.6*   APTT 28.2 31.7 33.8*        Chemistries: ABG:   Recent Labs   Lab 07/15/23  0350 07/16/23  0451 07/16/23  1201 07/17/23  0619 07/18/23  0525 07/19/23  0350   * 145  --  143 142 140   K 3.8 4.0  --  4.1 3.9 4.0   * 118*  --  118* 118* 116*   CO2 21* 22*  --  20* 20* 19*   BUN 8 7  --  8 8 8   CREATININE 0.4* 0.4*  --  0.4* 0.4* 0.4*   CALCIUM 7.9* 7.8*  --  7.9* 8.0* 7.8*   PROT 4.6* 4.4*  --  4.5* 4.6* 4.7*   BILITOT 3.5* 3.0*  --  3.0* 2.9* 2.6*   ALKPHOS 94 124  --  143* 177* 207*   ALT 10 11  --  11 10 11   AST 26 24  --  24 26 26   MG 1.9 1.6  --  1.7 1.7 1.6   PHOS 1.6* 2.3* 2.4*  --   --   --     Recent Labs   Lab 07/13/23  1044   PH 7.429   PCO2 34.5*   PO2 150*   HCO3 22.9*   POCSATURATED 99   BE -1        POCT Glucose: HbA1c:    Recent Labs   Lab 07/17/23  1610 07/17/23  2359 07/18/23  1135 07/19/23  0050 07/19/23  0635 07/19/23  1135   POCTGLUCOSE 180* 161* 189* 184* 186* 222*     Hemoglobin A1C   Date Value Ref Range Status   05/29/2023 <4.0 (A) 4.0 - 5.6 % Final     Comment:     ADA Screening Guidelines:  5.7-6.4%  Consistent with prediabetes  >or=6.5%  Consistent with diabetes    High levels of fetal hemoglobin interfere with the HbA1C  assay. Heterozygous hemoglobin variants (HbS, HgC, etc)do  not significantly interfere with this assay.   However, presence of multiple variants may affect accuracy.  Falsely low HA1c results may be observed in patients   with clinical conditions that shorten erythrocyte   life span or decrease mean erythrocyte age.  HA1c   may not accurately reflect glycemic control when   clinical conditions that affect erythrocyte survival   are present. Fructosamine may be used as an alternate  measurement of glycemic control.     01/22/2021 4.1 4.0 - 5.6 % Final     Comment:     ADA Screening Guidelines:  5.7-6.4%  Consistent with prediabetes  >or=6.5%  Consistent with diabetes  High levels of fetal hemoglobin interfere with the HbA1C  assay. Heterozygous hemoglobin variants (HbS, HgC, etc)do  not significantly interfere with this assay.   However, presence of multiple variants may affect accuracy.     12/10/2020 4.6 4.0 - 5.6 % Final     Comment:     ADA Screening Guidelines:  5.7-6.4%  Consistent with prediabetes  >or=6.5%  Consistent with diabetes  High levels of fetal hemoglobin interfere with the HbA1C  assay. Heterozygous hemoglobin variants (HbS, HgC, etc)do  not significantly interfere with this assay.   However, presence of multiple variants may affect accuracy.          Cardiac Enzymes: Ejection Fractions:    No results for input(s): CPK, CPKMB, MB, TROPONINI in the last 72 hours. EF   Date Value Ref Range Status   07/16/2023 65 % Final   06/03/2023 70 % Final          No results for input(s): COLORU, APPEARANCEUA, PHUR, SPECGRAV, PROTEINUA, GLUCUA, KETONESU, BILIRUBINUA, OCCULTUA, NITRITE, UROBILINOGEN, LEUKOCYTESUR, RBCUA, WBCUA, BACTERIA, SQUAMEPITHEL,  HYALINECASTS in the last 48 hours.    Invalid input(s): WRIGHTSUR      Procalcitonin (ng/mL)   Date Value   06/16/2023 0.26 (H)     Lactate (Lactic Acid) (mmol/L)   Date Value   07/16/2023 1.4   07/16/2023 1.4   07/06/2023 1.5   07/05/2023 8.0 (HH)   06/11/2023 6.2 (HH)     BNP (pg/mL)   Date Value   07/16/2023 174 (H)     No results found for: CRP, SEDRATE  D-Dimer (mg/L FEU)   Date Value   07/07/2023 2.22 (H)     Ferritin (ng/mL)   Date Value   05/18/2023 110   10/23/2015 412 (H)   09/19/2015 599 (H)   09/19/2015 599 (H)   07/23/2015 551 (H)   06/17/2015 612 (H)     LD (U/L)   Date Value   06/05/2023 249   05/31/2023 426 (H)   09/19/2015 280 (H)     Troponin I (ng/mL)   Date Value   06/13/2023 0.029 (H)   06/13/2023 0.030 (H)   05/18/2023 0.035 (H)   05/18/2023 0.037 (H)   05/05/2023 <0.006   01/25/2023 <0.006     CPK (U/L)   Date Value   05/05/2023 47   01/25/2023 27   06/14/2020 23 (L)     CK (U/L)   Date Value   04/15/2023 98     Results for orders placed or performed during the hospital encounter of 05/18/23   Vitamin D   Result Value Ref Range    Vit D, 25-Hydroxy 15 (L) 30 - 96 ng/mL     SARS-CoV2 (COVID-19) Qualitative PCR (no units)   Date Value   06/30/2023 Not Detected   02/14/2023 Not Detected     SARS-CoV-2 RNA, Amplification, Qual (no units)   Date Value   05/28/2023 Negative   11/02/2021 Negative   01/21/2021 Negative   01/11/2021 Negative   11/21/2020 Negative   06/16/2020 Negative       Microbiology labs for the last week  Microbiology Results (last 7 days)       Procedure Component Value Units Date/Time    Urine culture [[id number]]  (Abnormal) Collected: 07/17/23 0111    Order Status: Completed Specimen: Urine Updated: 07/18/23 1459     Urine Culture, Routine YEAST   10,000 - 49,999 cfu/ml  Identification pending  No other significant isolate      Narrative:      Specimen Source->Urine            Reviewed and noted in plan where applicable- Please see chart for full lab data.    Lines/Drains:        Peripheral IV - Single Lumen 07/12/23 1148 20 G;1 3/4 in Left Forearm (Active)   Site Assessment Clean;Dry;Intact 07/16/23 0701   Extremity Assessment Distal to IV No abnormal discoloration 07/16/23 0701   Line Status No blood return;Flushed;Saline locked 07/16/23 0701   Dressing Status Dry;Clean;Intact 07/16/23 0701   Dressing Intervention Integrity maintained 07/16/23 0701   Dressing Change Due 07/16/23 07/16/23 0701   Site Change Due 07/16/23 07/16/23 0701   Reason Not Rotated Not due 07/16/23 0701   Number of days: 3            Peripheral IV - Single Lumen 07/14/23 1540 20 G;1 3/4 in Right Upper Arm (Active)   Site Assessment Intact;Clean;Dry 07/16/23 0701   Extremity Assessment Distal to IV No abnormal discoloration 07/16/23 0701   Line Status Blood return noted;Flushed;Saline locked 07/16/23 0701   Dressing Status Dry;Intact;Clean 07/16/23 0701   Dressing Intervention Integrity maintained 07/16/23 0701   Dressing Change Due 07/18/23 07/16/23 0701   Site Change Due 07/18/23 07/16/23 0701   Reason Not Rotated Not due 07/16/23 0701   Number of days: 1            NG/OG Tube 07/13/23 1311 [place] sump 18 Fr. Right nostril (Active)   Placement Check placement verified by aspirate characteristics 07/16/23 0701   Tolerance no signs/symptoms of discomfort 07/16/23 0701   Securement secured to nostril center w/ adhesive device 07/16/23 0701   Clamp Status/Tolerance unclamped 07/16/23 0701   Suction Setting/Drainage Method suction at the bedside 07/16/23 0701   Insertion Site Appearance no redness, warmth, tenderness, skin breakdown, drainage 07/16/23 0701   Flush/Irrigation flushed w/;water 07/16/23 0502   Feeding Type continuous;by pump 07/16/23 0701   Feeding Action feeding continued 07/16/23 0701   Current Rate (mL/hr) 50 mL/hr 07/16/23 0701   Goal Rate (mL/hr) 50 mL/hr 07/16/23 0701   Intake (mL) 20 mL 07/15/23 0800   Water Bolus (mL) 250 mL 07/16/23 0501   Rate Formula Tube Feeding (mL/hr) 20 mL/hr 07/14/23 0400  "  Formula Name Doug 1.5 07/16/23 0701   Intake (mL) - Formula Tube Feeding 50 07/16/23 0700   Residual Amount (ml) 30 ml 07/15/23 2337   Number of days: 2            Urethral Catheter 07/05/23 1332 Double-lumen (Active)   Site Assessment Clean;Intact 07/16/23 0701   Collection Container Urimeter 07/16/23 0701   Securement Method secured to top of thigh w/ adhesive device 07/16/23 0701   Catheter Care Performed yes 07/16/23 0701   Reason for Continuing Urinary Catheterization Non-healing sacral/perineal wound 07/16/23 0701   CAUTI Prevention Bundle Securement Device in place with 1" slack;Intact seal between catheter & drainage tubing;Drainage bag/urimeter off the floor;Sheeting clip in use;No dependent loops or kinks;Drainage bag/urimeter not overfilled (<2/3 full);Drainage bag/urimeter below bladder 07/16/23 0701   Output (mL) 75 mL 07/16/23 0738   Number of days: 10            Fecal Incontinence  07/09/23 2300 (Active)   $ Fecal Management Supplies Fecal Management System (Supply) 07/14/23 1954   Application fecal incontinence  in place 07/16/23 0701   Drainage Method attached to drainage bag 07/16/23 0701   Securement to gravity 07/16/23 0701   Skin cleansed, skin barrier applied 07/16/23 0701   Tolerance no signs/symptoms of discomfort 07/16/23 0701   Stool (mL) 200 mL 07/16/23 0501   Number of days: 6       Imaging  ECG Results              EKG 12-lead (Final result)  Result time 07/05/23 13:56:05      Final result by Interface, Lab In [facility] (07/05/23 13:56:05)               Narrative:    Test Reason : E87.5,    Vent. Rate : 103 BPM     Atrial Rate : 103 BPM     P-R Int : 132 ms          QRS Dur : 076 ms      QT Int : 342 ms       P-R-T Axes : 073 054 084 degrees     QTc Int : 448 ms    Age and gender specific analysis  Sinus tachycardia  Low voltage QRS  Low anterior forces and R wave progression  Possibly acute STEMI    ACUTE MI / STEMI    Abnormal ECG  When compared with ECG of " 05-JUL-2023 11:07,  No significant change was found  Confirmed by Abimael CLEMENTS MD ([id number]) on 7/5/2023 1:55:55 PM    Referred By: [email or web address]   SELF           Confirmed By:Abimael CLEMENTS MD                                   EKG 12-lead (Final result)  Result time 07/05/23 14:01:06      Final result by Interface, Lab In [facility] (07/05/23 14:01:06)               Narrative:    Test Reason : K92.2,    Vent. Rate : 096 BPM     Atrial Rate : 096 BPM     P-R Int : 114 ms          QRS Dur : 066 ms      QT Int : 352 ms       P-R-T Axes : 078 090 065 degrees     QTc Int : 444 ms    Normal sinus rhythm  Vertical axis  Otherwise normal ECG  When compared with ECG of 27-JUN-2023 12:05,  T wave inversion no longer evident in Anterior leads  Confirmed by Cameron Hodge MD ([id number]) on 7/5/2023 2:01:00 PM    Referred By: System System           Confirmed By:Cameron Hodge MD                                  Results for orders placed during the hospital encounter of 05/28/23    Echo    Interpretation Summary  · The left ventricle is normal in size with hyperdynamic systolic function. The estimated ejection fraction is 70%.  · Normal right ventricular size with normal right ventricular systolic function.  · Normal left ventricular diastolic function.  · Mild left atrial enlargement.  · Mechanically ventilated; cannot use inferior caval vein diameter to estimate central venous pressure.  · Trivial pericardial effusion.  · There is a left pleural effusion.      Fl Modified Barium Swallow Speech  Narrative: EXAMINATION:  FL MODIFIED BARIUM SWALLOW SPEECH STUDY    CLINICAL HISTORY:  dysphagia;    TECHNIQUE:  Video fluoroscopic swallowing examination was performed in conjunction with the Speech Language Pathology Department.  Both thin and variably thickened liquid barium products as well as pureed food substances were used to assess swallowing.    Fluoroscopy time: 2.5 minutes    COMPARISON:  CT neck 07/07/2023    FINDINGS:  Oral phase:  Normal.    Pharyngeal phase: Patient initially aspirated a single spoonful of thin liquids with an adequate cough reflex.  There was mild vallecular and piriform stasis with thin liquids.  Patient subsequently had subglottic penetration without aspiration on subsequent thin liquid administrations.  No penetration or aspiration with nectar products.    Upper esophageal phase: Normal.  Impression: Single aspiration episode with thin liquids.    Please see speech pathology report for further details.    Electronically signed by resident: Emiliano Mcmahon  Date:    07/18/2023  Time:    14:38    Electronically signed by: Trent Henry MD  Date:    07/18/2023  Time:    16:02      Labs, Imaging, EKG and Diagnostic results from 7/19/2023 were reviewed.    Medications:  Medication list was reviewed and changes noted under Assessment/Plan.  No current facility-administered medications on file prior to encounter.     Current Outpatient Medications on File Prior to Encounter   Medication Sig Dispense Refill    ARIPiprazole (ABILIFY) 20 MG Tab Take 5 mg by mouth once daily.      folic acid (FOLVITE) 1 MG tablet Take 1 tablet (1 mg total) by mouth once daily. (Patient taking differently: Take 1 mg by mouth every evening.) 30 tablet 2    furosemide (LASIX) 20 MG tablet Take 20 mg by mouth once daily.      lactulose (CHRONULAC) 10 gram/15 mL solution Take 10 g by mouth 3 (three) times daily.      lithium carbonate 150 MG capsule Take 1 capsule (150 mg total) by mouth every evening. (Patient taking differently: Take 150 mg by mouth 2 (two) times a day.) 30 capsule 11    magnesium oxide (MAG-OX) 400 mg (241.3 mg magnesium) tablet Take by mouth once daily.      pantoprazole (PROTONIX) 40 MG tablet Take 40 mg by mouth once daily.      propranoloL (INDERAL) 40 MG tablet Take 40 mg by mouth once daily.      QUEtiapine (SEROQUEL) 300 MG Tab Take 100 mg by mouth every evening.      sodium bicarbonate 650 MG tablet Take 650 mg by mouth Daily.       spironolactone (ALDACTONE) 50 MG tablet Take 1 tablet (50 mg total) by mouth once daily. 90 tablet 3    zonisamide (ZONEGRAN) 100 MG Cap Take 100 mg by mouth once daily.      levETIRAcetam (KEPPRA) 1000 MG tablet Take 1,000 mg by mouth 2 (two) times daily.      OLANZapine (ZYPREXA) 10 MG tablet Take 1 tablet (10 mg total) by mouth every evening. (Patient not taking: Reported on 7/6/2023) 30 tablet 11    rifAXIMin (XIFAXAN) 550 mg Tab Take 1 tablet (550 mg total) by mouth 2 (two) times daily. (Patient not taking: Reported on 7/6/2023) 180 tablet 3     Scheduled Medications:  acetylcysteine 200 mg/ml (20%), 2 mL, Nebulization, TID WAKE  albuterol-ipratropium, 3 mL, Nebulization, Q6H WAKE  amoxicillin, 1,000 mg, Per NG tube, Q12H  cefTRIAXone (ROCEPHIN) IVPB, 1 g, Intravenous, Q24H  clarithromycin, 500 mg, Per NG tube, Q12H  lactulose, 10 g, Per NG tube, TID  levetiracetam, 1,000 mg, Per NG tube, BID  OLANZapine, 5 mg, Per NG tube, QHS  pantoprazole, 40 mg, Intravenous, BID      PRN: dextrose 10%, dextrose 10%, glucagon (human recombinant), insulin aspart U-100, sodium chloride 0.9%  Infusions:       Estimated Creatinine Clearance: 133.8 mL/min (A) (based on SCr of 0.4 mg/dL (L)).    Assessment/Plan:      * Gastrointestinal hemorrhage associated with duodenal ulcer    as  above        Dysphagia  7/18  MBS today -started puree nectar thick liquids.        Hypoxia   7/16 sats 92% on 5L of NC.  CX ray - Detrimental changes since the previous examination including interval increase in pulmonary vascularity and bilateral diffuse interstitial pulmonary parenchymal opacification, progression of abnormal opacification at the left lung base, and development of small right-sided pleural effusion.   findings would be consistent with congestive heart failure.PT/OT recs SNF. BNP , procalcitonin and Echo.     H. pylori infection    positive serology for H pylori, begin triple therapy with clarithromycin,amoxicillin, and PPI for 14  days       Hematochezia    - GI EGD clipped duodenal ulcers for IR reference  - Transfuse blood products for active bleeding   - trend CBC and follow  - IR embolized GDA for duodenal hyperemia   -- GI re evaluated site of duodenal ulcers, no noted active bleeding  -- Continue PPI BID for 8 weeks, then once daily after that  --Pt had positive serology for H pylori, begin triple therapy with clarithromycin,amoxicillin, and PPI for 14 days      Acute blood loss anemia     - Maintain IV access with 2 large bore Ivs  - Intravascular resuscitation/support with IVFs   - Hold all NSAIDs and anticoagulants, unless contraindicated.  - Please correct any coagulopathy with platelets and FFP for goal of platelets >50K and INR <2.0  - Please notify GI team if there is significant change in patient's clinical status  - To date, patient has required at least 21 units of pRBCs to maintain Hgb >7     7/16     Patient's with macrocytic anemia.. Hemoglobin stable. Etiology likely due to acute blood loss.  Current CBC reviewed-    Recent Labs   Lab 07/14/23  0752 07/15/23  0350 07/16/23  0825   HGB 8.6* 8.1* 8.0*         Component Value Date/Time     (H) 07/16/2023 0825    RDW 24.7 (H) 07/16/2023 0825    IRON 132 05/18/2023 1527    FERRITIN 110 05/18/2023 1527    RETIC 3.9 (H) 06/05/2023 0935    FOLATE 16.2 05/31/2023 1250    [id number] >2000 (H) 05/31/2023 1250    OCCULTBLOOD Negative 09/19/2015 0137     Monitor CBC and transfuse if H/H drops below 7/21.        Retroperitoneal bleed  Retroperitoneum: No significant adenopathy.  Similar sized left retroperitoneal hematoma measuring 11 x 9 cm (series 5, image 100; previously 11 x 9 cm).  The left iliacus collection also measures similar to prior at 5.4 cm (series 5, image 127; previously 5.6 cm).  Layering hyperdensity within these collections suggesting different ages in blood products.  No significant volume active extravasation into these collections  - CT-A demonstrated stable  retroperitoneal hematoma. No need for intervention at this time.          Supratherapeutic INR  patient with INR   Recent Labs   Lab 07/17/23  0619 07/18/23  0525 07/19/23  0350   INR 1.9* 1.7* 1.6*   . INR is supratherapeutic. secondary to coagulopathy from liver disease.monitor      UTI (urinary tract infection)  7/17 Saldaña removed. UA +. UC pending. started on IV ceftriaxone.  7/18 UC -YEAST 10,000 - 49,999 cfu/ml Identification pending No other significant isolate    Bipolar 1 disorder     - Consult psych regarding Bipolar 1 disorder history       Hepatic encephalopathy   ammonia 190s on admit -->90s . AAOX 1. no lactulose give due to GI bleed  repeat ammonia. Hepatolog eval  with     MELD 3.0: 21 at 7/17/2023  6:19 AM  MELD-Na: 18 at 7/17/2023  6:19 AM  Calculated from:  Serum Creatinine: 0.4 mg/dL (Using min of 1 mg/dL) at 7/17/2023  6:19 AM  Serum Sodium: 143 mmol/L (Using max of 137 mmol/L) at 7/17/2023  6:19 AM  Total Bilirubin: 3.0 mg/dL at 7/17/2023  6:19 AM  Serum Albumin: 2.2 g/dL at 7/17/2023  6:19 AM  INR(ratio): 1.9 at 7/17/2023  6:19 AM  Age at listing (hypothetical): 58 years  Sex: Female at 7/17/2023  6:19 AM  7/16 Ammonia 48 WNL. AAOX 1. Hepatology consulted for cirrhosis.    7/17  Hepatology eval. resumed lactulose.  7/18  hydroxyzine  PRN  discontinued  due to it not being safe in delirium    Severe protein-calorie malnutrition  Nutrition consulted. Most recent weight and BMI monitored-     Measurements:  Wt Readings from Last 1 Encounters:   07/16/23 62.7 kg (138 lb 4.4 oz)   Body mass index is 25.29 kg/m².    Patient has been screened and assessed by RD.    Malnutrition Type:  Context: social/environmental circumstances  Level: severe    Malnutrition Characteristic Summary:  Energy Intake (Malnutrition): less than or equal to 75% for greater than or equal to 1 month  Subcutaneous Fat (Malnutrition): severe depletion  Muscle Mass (Malnutrition): severe depletion      History of seizure  on  Keppra via NGT       Thrombocytopenia  with cirrhosis   Patient with thrombocytopenia   Recent Labs   Lab 07/17/23  0619 07/18/23  0525 07/19/23  0350   PLT 34* 32* 34*   . Platelet counts stable  .monitor      Cirrhosis, Capri's  alcoholic cirrhosis  MELD 3.0: 20 at 7/16/2023  4:51 AM  MELD-Na: 17 at 7/16/2023  4:51 AM  Calculated from:  Serum Creatinine: 0.4 mg/dL (Using min of 1 mg/dL) at 7/16/2023  4:51 AM  Serum Sodium: 145 mmol/L (Using max of 137 mmol/L) at 7/16/2023  4:51 AM  Total Bilirubin: 3.0 mg/dL at 7/16/2023  4:51 AM  Serum Albumin: 2.3 g/dL at 7/16/2023  4:51 AM  INR(ratio): 1.8 at 7/15/2023  3:50 AM  Age at listing (hypothetical): 58 years  Sex: Female at 7/16/2023  4:51 AM     Recent Labs   Lab 07/16/23  1201   AMMONIA 48     Strict input /output monitor, daily weights        VTE Risk Mitigation (From admission, onward)           Ordered     Place sequential compression device  Until discontinued         07/14/23 1024     Reason for No Pharmacological VTE Prophylaxis  Once        Question:  Reasons:  Answer:  Active Bleeding    07/05/23 1334     IP VTE HIGH RISK PATIENT  Once         07/05/23 1334                    Discharge Planning   BRAYAN: 7/21/2023     Code Status: DNR   Is the patient medically ready for discharge?: No    Reason for patient still in hospital (select all that apply): Treatment  Discharge Plan A: Skilled Nursing Facility   Discharge Delays: None known at this time              Seth Noyola MD  Department of Hospital Medicine   [facility] - Telemetry Stepdown

## 2023-07-19 NOTE — PLAN OF CARE
07/19/23 1636   Post-Acute Status   Post-Acute Authorization Placement   Post-Acute Placement Status Pending post-acute provider review/more information requested     Spoke with patient's sister, Zaria [phone], regarding dc planning. Informed that per [facility], they will not have a bed this week and will not have beds on Monday. Per MD, patient is expected to be ready by Friday 7/21/23. Zaria reports she is not agreeable to any facility that is not an equivalent environment to [facility] as she feels patient is medically fragile. Offered and email a SNF list in network with patient's insurance.    Mirela Cintron, LCSW [facility]  Ext. [phone]

## 2023-07-19 NOTE — NURSING
This RN fed pt her dinner tray and nectar thick liquids. Pt had 2 whole apple juices and a cup of water. Pt ate 25% of her meals with no difficulty. TF still infusing until pt reaches caloric intake. WCTM

## 2023-07-19 NOTE — PT/OT/SLP PROGRESS
Occupational Therapy   Treatment    Name: Marely Hamilton  MRN: [id number]  Admitting Diagnosis:  Gastrointestinal hemorrhage associated with duodenal ulcer  8 Days Post-Op    Recommendations:     Discharge Recommendations: nursing facility, skilled  Discharge Equipment Recommendations:  to be determined by next level of care  Barriers to discharge:  Inaccessible home environment, Decreased caregiver support    Assessment:     Marely Hamilton is a 58 y.o. female with a medical diagnosis of Gastrointestinal hemorrhage associated with duodenal ulcer.  She presents with the following performance deficits affecting function: weakness, impaired endurance, impaired sensation, impaired self care skills, impaired functional mobility, gait instability, impaired balance, decreased coordination, decreased upper extremity function, decreased lower extremity function, decreased ROM, impaired fine motor, impaired cardiopulmonary response to activity. Pt in bed assisted with combing matted hair, washing hair, and washing face. During ADLs pt assisted w/ turning and holding head up requiring max-total a.     Rehab Prognosis:  Good; patient would benefit from acute skilled OT services to address these deficits and reach maximum level of function.       Plan:     Patient to be seen 3 x/week to address the above listed problems via self-care/home management, therapeutic activities, therapeutic exercises, neuromuscular re-education  Plan of Care Expires: 08/11/23  Plan of Care Reviewed with: patient    Subjective     Chief Complaint: None stated  Patient/Family Comments/goals: return to PLOF  Pain/Comfort:  Pain Rating 1: 0/10  Pain Rating Post-Intervention 1: 0/10    Objective:     Communicated with: ANKITA prior to session.  Patient found HOB elevated with telemetry, oxygen, bowel management system, NG tube upon OT entry to room.    General Precautions: Standard, aspiration, fall    Orthopedic Precautions:N/A  Braces: N/A  Respiratory  Status: Nasal cannula     Occupational Performance:     Bed Mobility:    Patient completed Rolling/Turning to Left with  maximal assistance and total assistance  Patient completed Rolling/Turning to Right with maximal assistance and total assistance     Activities of Daily Living:  Grooming: total assistance    Bathing: total assistance        [facility] 6 Click ADL: 6    Treatment & Education:  Role of OT, goals, POC    Patient left HOB elevated with all lines intact, call button in reach, and NSG notified    GOALS:   Multidisciplinary Problems       Occupational Therapy Goals          Problem: Occupational Therapy    Goal Priority Disciplines Outcome Interventions   Occupational Therapy Goal     OT, PT/OT Ongoing, Progressing    Description: Goals to be met by: 7/28/2023     Patient will increase functional independence with ADLs by performing:    UE Dressing with Minimal Assistance.  LE Dressing with Moderate Assistance.  Grooming while EOB with Minimal Assistance.  Toileting from bedside commode with Maximum Assistance for hygiene and clothing management.   Supine to sit with Moderate Assistance.  Stand pivot transfers with Maximum Assistance with or without AD.  Toilet transfer to bedside commode with Maximum Assistance with or without AD.                         Time Tracking:     OT Date of Treatment: 07/19/23  OT Start Time: 1405  OT Stop Time: 1520  OT Total Time (min): 75 min    Billable Minutes:Self Care/Home Management 40  Therapeutic Activity 20    OT/DC: OT          7/19/2023

## 2023-07-19 NOTE — PLAN OF CARE
Problem: Adult Inpatient Plan of Care  Goal: Plan of Care Review  Outcome: Ongoing, Progressing     Problem: Adult Inpatient Plan of Care  Goal: Patient-Specific Goal (Individualized)  Outcome: Ongoing, Progressing     Problem: Oral Intake Inadequate (Acute Kidney Injury/Impairment)  Goal: Optimal Nutrition Intake  Outcome: Ongoing, Progressing     Problem: Fall Injury Risk  Goal: Absence of Fall and Fall-Related Injury  Outcome: Ongoing, Progressing     Pt oriented to name, hospital and year. Disoriented to situation and still has disorganized thoughts. VSS. NSR to ST on tele. 2L NC. BG covered with SSI. Pt on dysphagia diet and tolerating well. TF infusing at goal rate. NGT in place. Mittens discontinued. Pt not trying to pull at anything. Wounds redressed. Pt turned q 2hr. FMS tube with 600ml output. Safety maintained. Bed alarm in use. HOB elevated.

## 2023-07-19 NOTE — PROGRESS NOTES
"CONSULTATION LIAISON PSYCHIATRY PROGRESS NOTE    Patient Name: Marely Hamilton  MRN: [id number]  Patient Class: IP- Inpatient  Admission Date: 7/5/2023  Attending Physician: Seth Noyola MD      SUBJECTIVE:   Marely Hamilton is a 58 y.o. female with past psychiatric history of bipolar disorder, alcohol use disorder & past pertinent medical history of seizure disorder, alcohol induced hepatic cirrhosis presents to the ED/admitted to the hospital for Gastrointestinal hemorrhage associated with duodenal ulcer. Patient presented from SNF (when she experienced alida blood per rectum per chart review), for which she was recently discharged to when she presented to the hospital on for hepatic encephalopathy c/b retroperitoneal bleed (s/p ICV filter and IR embolization).      Psychiatry consulted for "Bipolar disorder history"    Per overnight nurse, pt oriented to name, hospital and year. Disoriented to situation and still has disorganized thoughts. Mittens have been discontinued. Pt not trying to pull at anything.     Today, patient reported recognizing interviewer from previous assessments. She stated that she felt much better and that she slept well last night. She did endorse visual hallucinations of seeing her ", Jeremy Chapman" in the room and also seeing "Marely" in the room. Patient was easily redirected and reoriented. Patient denied SI/HI.       OBJECTIVE:    Mental Status Exam:  General Appearance: dressed in hospital garb, appears older than stated age  Behavior: polite, appropriate eye-contact, minimal responses  Involuntary Movements and Motor Activity: Patient with chewing movements of mouth at rest  Gait and Station: unable to assess - patient lying down or seated  Speech and Language: decreased spontaneity, increased latency of response, responds to questions minimally/briefly  Mood: "great"  Affect: constricted, Intense  Thought Process and Associations: bizarre  Thought Content and Perceptions:: no " suicidal ideation, no homicidal ideation, + visual hallucinations  Sensorium and Orientation: alert, delirious, waxing and waning, oriented to person and place, oriented to year  Recent and Remote Memory: impaired, forgetful  Attention and Concentration: impaired  Fund of Knowledge: Unable to Formally Assess  Insight: impaired  Judgment: impaired, limited      ASSESSMENT & RECOMMENDATIONS   Delirium  Hx of Bipolar Disorder    DELIRIUM  DELIRIUM BEHAVIOR MANAGEMENT  Continue to Limit or Discontinue use of Narcotics, Benzos and Anti-cholinergic medications as they may worsen delirium.  PLEASE utilize CHEMICAL restraints with PRN meds first for agitation. Minimize use of PHYSICAL restraints OR have periods of being out of physical restraints if possible.  Keep window shades open and room lit during day and room dim at night in order to promote normal sleep-wake cycles  Encourage family at bedside. [place] patient often to situation, location, date.  Continue medical workup for causative etiology of Delirium     PSYCH MEDICATIONS  Continue zyprexa 5 mg QHS  PRN: Does not appear to be requiring PRNs at this time, in future if patient does become agitated recommend Zyprexa 5 mg PO/IM.   In future, once patient stabilized, recommend restarting home lithium, likely in outpatient setting  Monitor QTc with daily EKGs while on zyprexa. Recommend Repeat EKG to monitor QTc     RISK ASSESSMENT  NO NEED FOR PEC Patient not in any imminent danger of hurting self or others  FOLLOW UP  Will follow up while in house     DISPOSITION - once medically cleared:   Defer to medical team          Please contact ON CALL psychiatry service (24/7) for any acute issues that may arise.    Dr. Mehdi VACA Psychiatry  [facility]  7/19/2023 10:28  AM        --------------------------------------------------------------------------------------------------------------------------------------------------------------------------------------------------------------------------------------    CONTINUED OBJECTIVE clinical data & findings reviewed and noted for above decision making    Current Medications:   Scheduled Meds:    acetylcysteine 200 mg/ml (20%)  2 mL Nebulization TID WAKE    albuterol-ipratropium  3 mL Nebulization Q6H WAKE    amoxicillin  1,000 mg Per NG tube Q12H    cefTRIAXone (ROCEPHIN) IVPB  1 g Intravenous Q24H    clarithromycin  500 mg Per NG tube Q12H    lactulose  10 g Per NG tube TID    levetiracetam  1,000 mg Per NG tube BID    OLANZapine  5 mg Per NG tube QHS    pantoprazole  40 mg Intravenous BID     PRN Meds: dextrose 10%, dextrose 10%, glucagon (human recombinant), insulin aspart U-100, sodium chloride 0.9%    Allergies:   Review of patient's allergies indicates:   Allergen Reactions    Sulfa (sulfonamide antibiotics) Rash    Codeine Nausea And Vomiting       Vitals  Vitals:    07/19/23 1010   BP: 139/63   Pulse: 101   Resp: 20   Temp: 97.7 °F (36.5 °C)       Labs/Imaging/Studies:  Recent Results (from the past 24 hour(s))   POCT glucose    Collection Time: 07/18/23 11:35 AM   Result Value Ref Range    POCT Glucose 189 (H) 70 - 110 mg/dL   Calcium, ionized    Collection Time: 07/18/23  4:41 PM   Result Value Ref Range    Ionized Calcium 1.24 1.06 - 1.42 mmol/L   POCT glucose    Collection Time: 07/19/23 12:50 AM   Result Value Ref Range    POCT Glucose 184 (H) 70 - 110 mg/dL   APTT    Collection Time: 07/19/23  3:50 AM   Result Value Ref Range    aPTT 33.8 (H) 21.0 - 32.0 sec   Magnesium    Collection Time: 07/19/23  3:50 AM   Result Value Ref Range    Magnesium 1.6 1.6 - 2.6 mg/dL   Calcium, ionized    Collection Time: 07/19/23  3:50 AM   Result Value Ref Range    Ionized Calcium 1.21 1.06 - 1.42 mmol/L   CBC Auto Differential     Collection Time: 07/19/23  3:50 AM   Result Value Ref Range    WBC 3.09 (L) 3.90 - 12.70 K/uL    RBC 2.51 (L) 4.00 - 5.40 M/uL    Hemoglobin 8.0 (L) 12.0 - 16.0 g/dL    Hematocrit 26.2 (L) 37.0 - 48.5 %     (H) 82 - 98 fL    MCH 31.9 (H) 27.0 - 31.0 pg    MCHC 30.5 (L) 32.0 - 36.0 g/dL    RDW 25.2 (H) 11.5 - 14.5 %    Platelets 34 (LL) 150 - 450 K/uL    MPV 11.2 9.2 - 12.9 fL    Immature Granulocytes 0.3 0.0 - 0.5 %    Gran # (ANC) 2.3 1.8 - 7.7 K/uL    Immature Grans (Abs) 0.01 0.00 - 0.04 K/uL    Lymph # 0.4 (L) 1.0 - 4.8 K/uL    Mono # 0.3 0.3 - 1.0 K/uL    Eos # 0.1 0.0 - 0.5 K/uL    Baso # 0.01 0.00 - 0.20 K/uL    nRBC 0 0 /100 WBC    Gran % 74.8 (H) 38.0 - 73.0 %    Lymph % 12.0 (L) 18.0 - 48.0 %    Mono % 9.7 4.0 - 15.0 %    Eosinophil % 2.9 0.0 - 8.0 %    Basophil % 0.3 0.0 - 1.9 %    Platelet Estimate Decreased (A)     Aniso Slight     Poik Slight     Ovalocytes Occasional     Tear Drop Cells Occasional     David Cells Occasional     Differential Method Automated    Comprehensive Metabolic Panel    Collection Time: 07/19/23  3:50 AM   Result Value Ref Range    Sodium 140 136 - 145 mmol/L    Potassium 4.0 3.5 - 5.1 mmol/L    Chloride 116 (H) 95 - 110 mmol/L    CO2 19 (L) 23 - 29 mmol/L    Glucose 189 (H) 70 - 110 mg/dL    BUN 8 6 - 20 mg/dL    Creatinine 0.4 (L) 0.5 - 1.4 mg/dL    Calcium 7.8 (L) 8.7 - 10.5 mg/dL    Total Protein 4.7 (L) 6.0 - 8.4 g/dL    Albumin 2.2 (L) 3.5 - 5.2 g/dL    Total Bilirubin 2.6 (H) 0.1 - 1.0 mg/dL    Alkaline Phosphatase 207 (H) 55 - 135 U/L    AST 26 10 - 40 U/L    ALT 11 10 - 44 U/L    eGFR >60.0 >60 mL/min/1.73 m^2    Anion Gap 5 (L) 8 - 16 mmol/L   Protime-INR    Collection Time: 07/19/23  3:50 AM   Result Value Ref Range    Prothrombin Time 16.3 (H) 9.0 - 12.5 sec    INR 1.6 (H) 0.8 - 1.2     Imaging Results              CTA Acute GI [place], Abdomen and Pelvis (Final result)  Result time 07/05/23 17:15:19      Final result by Mumtaz Garsia MD (07/05/23 17:15:19)                    Impression:      Images are degraded by significant streak and motion artifacts.    Stable large left retroperitoneal hematoma and left iliacus hematoma.  No contrast extravasation within the hematomas or bowel to indicate active arterial bleed.    Hepatic cirrhosis.  Diffuse wall thickening of the stomach and colon, which can be seen in the setting of hepatic dysfunction.  However, superimposed inflammatory processes are not excluded.    Multiple, nondilated, distended fluid-filled loops of small bowel without a definite transition point.  Stool and air seen within the colon, this is suggestive of ileus.    Small left pleural effusion with associated compressive atelectasis.    Cholelithiasis.    Cardiomegaly.    Electronically signed by resident: Emiliano Mcmahon  Date:    07/05/2023  Time:    16:29    Electronically signed by: Mumtaz Garisa MD  Date:    07/05/2023  Time:    17:15               Narrative:    EXAMINATION:  CTA ACUTE GI BLEED, ABDOMEN AND PELVIS    CLINICAL HISTORY:  GI bleed;    TECHNIQUE:  Initial noncontrast  images were obtained of the abdomen and pelvis.  CT axial angiography images were then obtained from the lung bases to the pubic symphysis following the intravenous administration of 100 of Omnipaque 350 with delayed images obtained per GI bleeding protocol.  Sagittal and coronal reformats were provided.    COMPARISON:  CTA GI bleed 06/13/2023    FINDINGS:  Images are degraded by significant streak and motion artifacts.    Lungs: Interval decrease in size of the small right pleural effusion with associated compressive atelectasis.  Resolution of the left pleural effusion.    Heart: Enlarged.  No pericardial effusion.    Liver: Nodular contour of the liver.  No focal abnormality.    Gallbladder: Multiple calcified gallstones.  No pericholecystic inflammatory changes.    Bile ducts: No intrahepatic or extrahepatic biliary ductal dilatation.    Spleen: Normal  size.    Pancreas: No mass. No ductal dilatation. No peripancreatic fat stranding.    Adrenals: No significant abnormality    Renal/Ureters: Bilateral cortical thinning.  No hydronephrosis.  Proximal ureters are normal caliber.  The distal ureters are difficult to evaluate due to streak artifact.  Bladder is decompressed with Saldaña catheter in place.    Reproductive: Uterus appears without significant abnormality.  No adnexal mass, noting limited evaluation due to significant streak artifact.    Stomach/Bowel: Stomach is distended with ingested contents with thickened rugal folds.  Small bowel is distended with multiple nondilated fluid-filled loops.  No transition point.  Appendix is normal.  Diffuse wall thickening throughout the colon with mild stool burden.  Diffuse wall thickening of the rectum.  No contrast extravasation to indicate active arterial bleed.    Peritoneum: Stable large left retroperitoneal hematoma measuring 11.5 x 7.8 x 12.6 cm.  No contrast extravasation to indicate active bleed within the hematoma.  Additional smaller hematoma anterior to the left iliacus muscle, which appears stable compared to prior CTA.  No free fluid. No intraperitoneal free air.    Lymph Nodes: Multiple prominent mesenteric and retroperitoneal lymph nodes.    Vasculature: Abdominal aorta tapers normally.  Mild atherosclerosis of the abdominal aorta and its branches.    Bones: Bony mineralization is diminished.  Left hip arthroplasty.  Generalized body wall edema.    Soft Tissues: No significant abnormality.                                       CT Head Without Contrast (Final result)  Result time 07/05/23 15:10:53      Final result by Viktor Desouza MD (07/05/23 15:10:53)                   Impression:      No evidence of acute intracranial pathology.    Volume loss again identified.    Electronically signed by resident: Kenney Rosas  Date:    07/05/2023  Time:    14:45    Electronically signed by: Viktor  Deo  Date:    07/05/2023  Time:    15:10               Narrative:    EXAMINATION:  CT HEAD WITHOUT CONTRAST    CLINICAL HISTORY:  Mental status change, unknown cause;    TECHNIQUE:  Low dose axial CT images obtained throughout the head without the use of intravenous contrast.  Axial, sagittal and coronal reconstructions were performed.    COMPARISON:  CT head 06/22/2023    FINDINGS:  Intracranial compartment:    Volume loss without evidence of hydrocephalus.    No parenchymal  hemorrhage, edema, mass effect or major vascular distribution infarct.    Decreased attenuation in the periventricular white matter is nonspecific but may reflect mild chronic small vessel ischemic change vs other encephalopathy.    No extra-axial blood or fluid collections.    Skull/extracranial contents (limited evaluation):    No displaced calvarial fracture.    The visualized sinuses and mastoid air cells are clear.                                       X-Ray Chest AP Portable (Final result)  Result time 07/05/23 13:09:11      Final result by Americo Reyes MD (07/05/23 13:09:11)                   Impression:      No significant detrimental interval change in the appearance of the chest since 06/25/2023 is appreciated, with significant improvement as noted above.  No post procedure pneumothorax.      Electronically signed by: Americo Reyes MD  Date:    07/05/2023  Time:    13:09               Narrative:    EXAMINATION:  XR CHEST AP PORTABLE    TECHNIQUE:  One view was obtained.  Note is made of the fact that the thorax is obscured to some extent by opacities external to the patient, particularly defibrillator pads.    COMPARISON:  Comparison is made to the most recent prior chest radiograph of 06/25/2023.    FINDINGS:  Endotracheal tube has been placed, its tip lying just superior to the apex of the aortic arch, well above the katharina.  Left jugular origin vascular catheter is now seen, its tip in the superior vena cava near the junction of  the right and left brachiocephalic veins.  Allowing for magnification of the cardiomediastinal silhouette related to projection, the heart is not significantly enlarged.  Opacity in both inferior hemithoraces seen on the previous examination has resolved, consistent with clearing of airspace consolidation in both mid/lower lung zones and resolution of previously present bilateral pleural fluid.  Lung zones are currently clear and free of significant airspace consolidation or volume loss.  No pleural fluid of any substantial volume is seen on either side.  No pneumothorax.

## 2023-07-19 NOTE — PT/OT/SLP PROGRESS
"Speech Language Pathology Treatment    Patient Name:  Marely Hamilton   MRN:  [id number]  Admitting Diagnosis: Gastrointestinal hemorrhage associated with duodenal ulcer    Recommendations:                 General Recommendations:  Dysphagia therapy  Diet recommendations:  Puree, Nectar Thick   Aspiration Precautions:   1 bite/sip at a time  Assistance with meals and  thickening liquids  Eliminate distractions  Feed only when awake/alert  Meds crushed in puree  No straws  Standard aspiration precautions   General Precautions: Standard, aspiration, fall  Communication strategies:  none    Assessment:     Marely Hamilton is a 58 y.o. female with an SLP diagnosis of mild Oropharyngeal dysphagia characterized by high risk for aspiration with thin liquids and residue with purees. Cognitive status and impulsivity also increase pt aspiration risk during meals. SLP to continue to follow.     Subjective     Pt found resting in bed upon SLP entry into room. Pt agreeable to participate in all aspects of session.      Patient goals: "I am doing better"     Pain/Comfort:  Pain Rating 1: 0/10    Respiratory Status: Nasal cannula, flow 2 L/min    Objective:     Has the patient been evaluated by SLP for swallowing?   Yes  Keep patient NPO? No   Current Respiratory Status:        Pt seen for ongoing dysphagia therapy. She was awake and alert throughout session though with intermittent periods of tangential speech and difficulty with topic maintenance. NG tube to R nare noted with feed running. HOB elevated for all PO intake. She consumed open cup sips of nectar thick liquids without difficulty though required verbal cueing to implement single sips. Effortful swallows of thin liquids via tsp x10 completed to improve swallow timing with cues to swallow "hard and fast;" pt able to recall directions x2 after MIN cueing.   Bites of altagracia crackers coated in pudding x2 tolerated without overt signs of aspiration though increased mastication " time noted. Following trials, pt exhibited persistent rotary chewing despite no additional food/drink present in mouth with inspection of oral cavity. Reviewed overall impressions, ongoing dysphagia therapy, pureed diet with examples of pureed foods, and ongoing SLP POC. Pt verbalizing understanding of skilled speech recommendations though unable to complete teach back despite ongoing MOD multi-modality cueing. No additional family/caregivers present in room for education. Pt left with bed in lowest locked position upon SLP exit.       Goals:   Multidisciplinary Problems       SLP Goals          Problem: SLP    Goal Priority Disciplines Outcome   SLP Goal     SLP Ongoing, Progressing   Description: Speech Pathology Goals  To be met by 7/27/23    1. Pt will participate in ongoing diagnostic dysphagia therapy                              Plan:     Patient to be seen:  4 x/week   Plan of Care expires:  08/12/23  Plan of Care reviewed with:  patient   SLP Follow-Up:  Yes       Discharge recommendations:  nursing facility, skilled   Barriers to Discharge:  None    Time Tracking:     SLP Treatment Date:   07/19/23  Speech Start Time:  1105  Speech Stop Time:  1121     Speech Total Time (min):  16 min    Billable Minutes: Treatment Swallowing Dysfunction 8 and Self Care/Home Management Training 8      07/19/2023

## 2023-07-19 NOTE — PT/OT/SLP PROGRESS
"Physical Therapy Co-Treatment with OT      Patient Name:  Marely Hamilton   MRN:  [id number]    Recommendations:     Discharge Recommendations: nursing facility, skilled  Discharge Equipment Recommendations: wheelchair, bedside commode, bath bench, grab bar (walk in shower with grab bars)  Barriers to discharge: None    Assessment:     Marely Hamilton is a 58 y.o. female admitted with a medical diagnosis of Gastrointestinal hemorrhage associated with duodenal ulcer.  She presents with the following impairments/functional limitations: weakness, impaired balance, decreased safety awareness, impaired endurance, impaired functional mobility, decreased lower extremity function, decreased coordination, impaired cognition placing her below baseline mobility. Patient demonstrated improved cognition and decreased fatigue at present visit leading to increased mobility tolerance. Continuing to recommend discharge to SNF.     Co-treatment with OT performed due to patient complexity and deficits, requiring two skilled therapists to appropriately and safely assess patient's strength and endurance while facilitating functional tasks in addition to accommodating for patient's activity tolerance.     Rehab Prognosis: Good; patient would benefit from acute skilled PT services to address these deficits and reach maximum level of function.    Recent Surgery: Procedure(s) (LRB):  EGD (ESOPHAGOGASTRODUODENOSCOPY) (N/A) 8 Days Post-Op    Plan:     During this hospitalization, patient to be seen 3 x/week to address the identified rehab impairments via therapeutic activities, therapeutic exercises, neuromuscular re-education and progress toward the following goals:    Plan of Care Expires:  08/10/23    Subjective     Chief Complaint: Weakness  Patient/Family Comments/goals: "I'm feeling better, do I look better?"   Pain/Comfort:  Pain Rating 1: 0/10  Pain Rating Post-Intervention 1: 0/10      Objective:     Communicated with RN prior to " session.  Patient found HOB elevated with NG tube, telemetry, oxygen, bowel management system upon PT entry to room. NG paused prior to treatment.     General Precautions: Standard, fall, aspiration  Orthopedic Precautions: N/A  Braces: N/A  Respiratory Status: Nasal cannula, flow 2 L/min     Functional Mobility:  Bed Mobility:     Rolling Left:  maximal assistance and of 2 persons  Scooting: total assistance and of 2 persons  Supine to Sit: maximal assistance and of 2 persons  Sit to Supine: maximal assistance and of 2 persons  Balance: Seated static balance good, progressed from maxA to SBA. Seated dynamic balance good, patient able to correct perturbations and remain upright. Tolerated ~ 20 minutes EOB.    Cognition:   -A&O x4  -Follows 1 step commands with 75% accuracy, needs repetition of directions  -Attention: impaired, distractible, requires verbal and tactile cues to redirect.        AM-PAC 6 CLICK MOBILITY  Turning over in bed (including adjusting bedclothes, sheets and blankets)?: 2  Sitting down on and standing up from a chair with arms (e.g., wheelchair, bedside commode, etc.): 1  Moving from lying on back to sitting on the side of the bed?: 2  Moving to and from a bed to a chair (including a wheelchair)?: 1  Need to walk in hospital room?: 1  Climbing 3-5 steps with a railing?: 1  Basic Mobility Total Score: 8       Treatment & Education:  Dynamic seated balance.     Patient left HOB elevated with all lines intact and call button in reach.    GOALS:   Multidisciplinary Problems       Physical Therapy Goals          Problem: Physical Therapy    Goal Priority Disciplines Outcome Goal Variances Interventions   Physical Therapy Goal     PT, PT/OT Ongoing, Progressing     Description: Goals to be met by: 8/10/23     Patient will increase functional independence with mobility by performin. Supine to sit with Moderate Assistance  2. Rolling to Left  with Minimal Assistance.  3. Sit to stand transfer  with Moderate Assistance  4. Bed to chair transfer with Maximum Assistance   5. Sitting at edge of bed x10 minutes with Contact Guard Assistance to perform functional activities.   6. Lower extremity exercise program x10 reps per handout, with assistance as needed to improve strength for functional activities.                          Time Tracking:     PT Received On: 07/19/23  PT Start Time: 0928     PT Stop Time: 0957  PT Total Time (min): 29 min     Billable Minutes: Neuromuscular Re-education 29 minutes    Treatment Type: Treatment  PT/PTA: PT     Number of PTA visits since last PT visit: 0     07/19/2023

## 2023-07-19 NOTE — PT/OT/SLP PROGRESS
"Occupational Therapy   Treatment    Co-treatment performed due to patient's multiple deficits requiring two skilled therapists to appropriately and safely assess patient's strength and endurance while facilitating functional tasks in addition to accommodating for patient's activity tolerance.     Name: Marely Hamilton  MRN: [id number]  Admitting Diagnosis:  Gastrointestinal hemorrhage associated with duodenal ulcer  8 Days Post-Op    Recommendations:     Discharge Recommendations: nursing facility, skilled  Discharge Equipment Recommendations:  to be determined by next level of care  Barriers to discharge:  Inaccessible home environment, Decreased caregiver support    Assessment:     Marely Hamilton is a 58 y.o. female with a medical diagnosis of Gastrointestinal hemorrhage associated with duodenal ulcer.  She presents with the following performance deficits affecting function: weakness, impaired endurance, impaired sensation, impaired self care skills, impaired functional mobility, gait instability, impaired balance, decreased coordination, decreased upper extremity function, decreased lower extremity function, decreased ROM, impaired fine motor, impaired cardiopulmonary response to activity.     Rehab Prognosis:  Good; patient would benefit from acute skilled OT services to address these deficits and reach maximum level of function.       Plan:     Patient to be seen 3 x/week to address the above listed problems via self-care/home management, therapeutic activities, therapeutic exercises  Plan of Care Expires: 08/11/23  Plan of Care Reviewed with: patient    Subjective     Chief Complaint: "I feel better"  Patient/Family Comments/goals: Maximize functional independence  Pain/Comfort:  Pain Rating 1: 0/10  Pain Rating Post-Intervention 1: 0/10    Objective:     Communicated with: NSANJEL prior to session.  Patient found HOB elevated with arterial line, bowel management system, blood pressure cuff, NG tube, telemetry, " pulse ox (continuous) upon OT entry to room.    General Precautions: Standard, aspiration, fall    Orthopedic Precautions:N/A  Braces: N/A  Respiratory Status: Nasal cannula     Occupational Performance:     Bed Mobility:    Patient completed Rolling/Turning to Left with  total assistance  Patient completed Rolling/Turning to Right with total assistance  Patient completed Scooting/Bridging with total assistance  Patient completed Supine to Sit with total assistance  Patient completed Sit to Supine with total assistance     Functional Mobility/Transfers:  Pt sat EOB x~15' w/ SBA-Max A    Activities of Daily Living:  Feeding:  total assistance    Grooming: total assistance to comb hair      [facility] 6 Click ADL: 6    Treatment & Education:  Role of OT, goals, POC    Patient left HOB elevated with all lines intact, call button in reach, bed alarm on, and NSG notified    GOALS:   Multidisciplinary Problems       Occupational Therapy Goals          Problem: Occupational Therapy    Goal Priority Disciplines Outcome Interventions   Occupational Therapy Goal     OT, PT/OT Ongoing, Progressing    Description: Goals to be met by: 7/28/2023     Patient will increase functional independence with ADLs by performing:    UE Dressing with Minimal Assistance.  LE Dressing with Moderate Assistance.  Grooming while EOB with Minimal Assistance.  Toileting from bedside commode with Maximum Assistance for hygiene and clothing management.   Supine to sit with Moderate Assistance.  Stand pivot transfers with Maximum Assistance with or without AD.  Toilet transfer to bedside commode with Maximum Assistance with or without AD.                         Time Tracking:     OT Date of Treatment: 07/19/23  OT Start Time: 0928  OT Stop Time: 0957  OT Total Time (min): 29 min    Billable Minutes:Self Care/Home Management 15  Therapeutic Activity 14    OT/DC: OT          7/19/2023

## 2023-07-19 NOTE — PLAN OF CARE
Problem: Infection  Goal: Absence of Infection Signs and Symptoms  Outcome: Ongoing, Progressing     Problem: Adult Inpatient Plan of Care  Goal: Plan of Care Review  Outcome: Ongoing, Progressing  Goal: Patient-Specific Goal (Individualized)  Outcome: Ongoing, Progressing  Goal: Absence of Hospital-Acquired Illness or Injury  Outcome: Ongoing, Progressing  Goal: Optimal Comfort and Wellbeing  Outcome: Ongoing, Progressing  Goal: Readiness for Transition of Care  Outcome: Ongoing, Progressing     Problem: Fluid and Electrolyte Imbalance (Acute Kidney Injury/Impairment)  Goal: Fluid and Electrolyte Balance  Outcome: Ongoing, Progressing     Problem: Oral Intake Inadequate (Acute Kidney Injury/Impairment)  Goal: Optimal Nutrition Intake  Outcome: Ongoing, Progressing     Problem: Renal Function Impairment (Acute Kidney Injury/Impairment)  Goal: Effective Renal Function  Outcome: Ongoing, Progressing     Problem: Impaired Wound Healing  Goal: Optimal Wound Healing  Outcome: Ongoing, Progressing     Problem: Fall Injury Risk  Goal: Absence of Fall and Fall-Related Injury  Outcome: Ongoing, Progressing     Problem: Restraint, Nonbehavioral (Nonviolent)  Goal: Absence of Harm or Injury  Outcome: Ongoing, Progressing     Problem: Nutrition Impairment (Mechanical Ventilation, Invasive)  Goal: Optimal Nutrition Delivery  Outcome: Ongoing, Progressing     Problem: Skin and Tissue Injury (Mechanical Ventilation, Invasive)  Goal: Absence of Device-Related Skin and Tissue Injury  Outcome: Ongoing, Progressing     Problem: Ventilator-Induced Lung Injury (Mechanical Ventilation, Invasive)  Goal: Absence of Ventilator-Induced Lung Injury  Outcome: Ongoing, Progressing     Problem: Communication Impairment (Artificial Airway)  Goal: Effective Communication  Outcome: Ongoing, Progressing     Problem: Device-Related Complication Risk (Artificial Airway)  Goal: Optimal Device Function  Outcome: Ongoing, Progressing     Problem: Skin  and Tissue Injury (Artificial Airway)  Goal: Absence of Device-Related Skin or Tissue Injury  Outcome: Ongoing, Progressing     Problem: Skin Injury Risk Increased  Goal: Skin Health and Integrity  Outcome: Ongoing, Progressing    Pt AAOx3. VSS. No complaints of pain or discomfort. Routine meds administered. Continuous telemetry monitoring. Tube feedings paused prior to oral feedings. Safety measures maintained. Monitoring ongoing.

## 2023-07-20 LAB
ALBUMIN SERPL BCP-MCNC: 2.2 G/DL (ref 3.5–5.2)
ALP SERPL-CCNC: 177 U/L (ref 55–135)
ALT SERPL W/O P-5'-P-CCNC: 12 U/L (ref 10–44)
ANION GAP SERPL CALC-SCNC: 4 MMOL/L (ref 8–16)
APTT PPP: 31.4 SEC (ref 21–32)
AST SERPL-CCNC: 27 U/L (ref 10–40)
BASOPHILS # BLD AUTO: 0.02 K/UL (ref 0–0.2)
BASOPHILS NFR BLD: 0.6 % (ref 0–1.9)
BILIRUB SERPL-MCNC: 2.9 MG/DL (ref 0.1–1)
BUN SERPL-MCNC: 9 MG/DL (ref 6–20)
CA-I BLDV-SCNC: 1.18 MMOL/L (ref 1.06–1.42)
CA-I BLDV-SCNC: 1.22 MMOL/L (ref 1.06–1.42)
CALCIUM SERPL-MCNC: 7.9 MG/DL (ref 8.7–10.5)
CHLORIDE SERPL-SCNC: 115 MMOL/L (ref 95–110)
CLINICAL BIOCHEMIST REVIEW: NORMAL
CO2 SERPL-SCNC: 21 MMOL/L (ref 23–29)
CREAT SERPL-MCNC: 0.4 MG/DL (ref 0.5–1.4)
DIFFERENTIAL METHOD: ABNORMAL
EOSINOPHIL # BLD AUTO: 0.1 K/UL (ref 0–0.5)
EOSINOPHIL NFR BLD: 2.8 % (ref 0–8)
ERYTHROCYTE [DISTWIDTH] IN BLOOD BY AUTOMATED COUNT: 25.9 % (ref 11.5–14.5)
EST. GFR  (NO RACE VARIABLE): >60 ML/MIN/1.73 M^2
GLUCOSE SERPL-MCNC: 170 MG/DL (ref 70–110)
HCT VFR BLD AUTO: 24.8 % (ref 37–48.5)
HGB BLD-MCNC: 7.9 G/DL (ref 12–16)
IMM GRANULOCYTES # BLD AUTO: 0.01 K/UL (ref 0–0.04)
IMM GRANULOCYTES NFR BLD AUTO: 0.3 % (ref 0–0.5)
INR PPP: 1.5 (ref 0.8–1.2)
LYMPHOCYTES # BLD AUTO: 0.5 K/UL (ref 1–4.8)
LYMPHOCYTES NFR BLD: 14.2 % (ref 18–48)
MAGNESIUM SERPL-MCNC: 1.7 MG/DL (ref 1.6–2.6)
MCH RBC QN AUTO: 32.5 PG (ref 27–31)
MCHC RBC AUTO-ENTMCNC: 31.9 G/DL (ref 32–36)
MCV RBC AUTO: 102 FL (ref 82–98)
MONOCYTES # BLD AUTO: 0.3 K/UL (ref 0.3–1)
MONOCYTES NFR BLD: 8.8 % (ref 4–15)
NEUTROPHILS # BLD AUTO: 2.3 K/UL (ref 1.8–7.7)
NEUTROPHILS NFR BLD: 73.3 % (ref 38–73)
NRBC BLD-RTO: 0 /100 WBC
PLATELET # BLD AUTO: 40 K/UL (ref 150–450)
PLPETH BLD-MCNC: <10 NG/ML
PMV BLD AUTO: 10.7 FL (ref 9.2–12.9)
POCT GLUCOSE: 294 MG/DL (ref 70–110)
POPETH BLD-MCNC: <10 NG/ML
POTASSIUM SERPL-SCNC: 3.8 MMOL/L (ref 3.5–5.1)
PROT SERPL-MCNC: 4.9 G/DL (ref 6–8.4)
PROTHROMBIN TIME: 15.8 SEC (ref 9–12.5)
RBC # BLD AUTO: 2.43 M/UL (ref 4–5.4)
SODIUM SERPL-SCNC: 140 MMOL/L (ref 136–145)
WBC # BLD AUTO: 3.17 K/UL (ref 3.9–12.7)

## 2023-07-20 PROCEDURE — C9113 INJ PANTOPRAZOLE SODIUM, VIA: HCPCS | Performed by: STUDENT IN AN ORGANIZED HEALTH CARE EDUCATION/TRAINING PROGRAM

## 2023-07-20 PROCEDURE — 99232 SBSQ HOSP IP/OBS MODERATE 35: CPT | Mod: ,,, | Performed by: INTERNAL MEDICINE

## 2023-07-20 PROCEDURE — 85730 THROMBOPLASTIN TIME PARTIAL: CPT

## 2023-07-20 PROCEDURE — 94668 MNPJ CHEST WALL SBSQ: CPT

## 2023-07-20 PROCEDURE — 97535 SELF CARE MNGMENT TRAINING: CPT

## 2023-07-20 PROCEDURE — 83735 ASSAY OF MAGNESIUM: CPT

## 2023-07-20 PROCEDURE — 36415 COLL VENOUS BLD VENIPUNCTURE: CPT

## 2023-07-20 PROCEDURE — 97530 THERAPEUTIC ACTIVITIES: CPT

## 2023-07-20 PROCEDURE — 85025 COMPLETE CBC W/AUTO DIFF WBC: CPT

## 2023-07-20 PROCEDURE — 25000003 PHARM REV CODE 250: Performed by: HOSPITALIST

## 2023-07-20 PROCEDURE — 99900035 HC TECH TIME PER 15 MIN (STAT)

## 2023-07-20 PROCEDURE — 97110 THERAPEUTIC EXERCISES: CPT

## 2023-07-20 PROCEDURE — 99232 SBSQ HOSP IP/OBS MODERATE 35: CPT | Mod: GC,,, | Performed by: PSYCHIATRY & NEUROLOGY

## 2023-07-20 PROCEDURE — 94761 N-INVAS EAR/PLS OXIMETRY MLT: CPT

## 2023-07-20 PROCEDURE — 99232 PR SUBSEQUENT HOSPITAL CARE,LEVL II: ICD-10-PCS | Mod: ,,, | Performed by: INTERNAL MEDICINE

## 2023-07-20 PROCEDURE — 82330 ASSAY OF CALCIUM: CPT | Mod: 91

## 2023-07-20 PROCEDURE — 80053 COMPREHEN METABOLIC PANEL: CPT

## 2023-07-20 PROCEDURE — 97112 NEUROMUSCULAR REEDUCATION: CPT

## 2023-07-20 PROCEDURE — 92526 ORAL FUNCTION THERAPY: CPT

## 2023-07-20 PROCEDURE — 94640 AIRWAY INHALATION TREATMENT: CPT

## 2023-07-20 PROCEDURE — 63600175 PHARM REV CODE 636 W HCPCS: Performed by: STUDENT IN AN ORGANIZED HEALTH CARE EDUCATION/TRAINING PROGRAM

## 2023-07-20 PROCEDURE — 25000003 PHARM REV CODE 250

## 2023-07-20 PROCEDURE — 99232 PR SUBSEQUENT HOSPITAL CARE,LEVL II: ICD-10-PCS | Mod: GC,,, | Performed by: PSYCHIATRY & NEUROLOGY

## 2023-07-20 PROCEDURE — 20600001 HC STEP DOWN PRIVATE ROOM

## 2023-07-20 PROCEDURE — 27000221 HC OXYGEN, UP TO 24 HOURS

## 2023-07-20 PROCEDURE — 63600175 PHARM REV CODE 636 W HCPCS: Performed by: HOSPITALIST

## 2023-07-20 PROCEDURE — 25000242 PHARM REV CODE 250 ALT 637 W/ HCPCS: Performed by: HOSPITALIST

## 2023-07-20 PROCEDURE — 85610 PROTHROMBIN TIME: CPT | Performed by: HOSPITALIST

## 2023-07-20 RX ADMIN — PANTOPRAZOLE SODIUM 40 MG: 40 INJECTION, POWDER, FOR SOLUTION INTRAVENOUS at 09:07

## 2023-07-20 RX ADMIN — ACETYLCYSTEINE 2 ML: 200 SOLUTION ORAL; RESPIRATORY (INHALATION) at 09:07

## 2023-07-20 RX ADMIN — PANTOPRAZOLE SODIUM 40 MG: 40 INJECTION, POWDER, FOR SOLUTION INTRAVENOUS at 01:07

## 2023-07-20 RX ADMIN — AMOXICILLIN 1000 MG: 500 CAPSULE ORAL at 09:07

## 2023-07-20 RX ADMIN — OLANZAPINE 5 MG: 5 TABLET, FILM COATED ORAL at 01:07

## 2023-07-20 RX ADMIN — LACTULOSE 10 G: 20 SOLUTION ORAL at 09:07

## 2023-07-20 RX ADMIN — CEFTRIAXONE 1 G: 1 INJECTION, POWDER, FOR SOLUTION INTRAMUSCULAR; INTRAVENOUS at 11:07

## 2023-07-20 RX ADMIN — PANTOPRAZOLE SODIUM 40 MG: 40 INJECTION, POWDER, FOR SOLUTION INTRAVENOUS at 11:07

## 2023-07-20 RX ADMIN — LACTULOSE 10 G: 20 SOLUTION ORAL at 03:07

## 2023-07-20 RX ADMIN — INSULIN ASPART 6 UNITS: 100 INJECTION, SOLUTION INTRAVENOUS; SUBCUTANEOUS at 12:07

## 2023-07-20 RX ADMIN — ACETYLCYSTEINE 2 ML: 200 SOLUTION ORAL; RESPIRATORY (INHALATION) at 07:07

## 2023-07-20 RX ADMIN — CLARITHROMYCIN 500 MG: 500 TABLET, FILM COATED ORAL at 09:07

## 2023-07-20 RX ADMIN — LACTULOSE 10 G: 20 SOLUTION ORAL at 11:07

## 2023-07-20 RX ADMIN — LACTULOSE 10 G: 20 SOLUTION ORAL at 01:07

## 2023-07-20 RX ADMIN — CLARITHROMYCIN 500 MG: 500 TABLET, FILM COATED ORAL at 11:07

## 2023-07-20 RX ADMIN — LEVETIRACETAM 1000 MG: 500 SOLUTION ORAL at 01:07

## 2023-07-20 RX ADMIN — OLANZAPINE 5 MG: 5 TABLET, FILM COATED ORAL at 09:07

## 2023-07-20 RX ADMIN — IPRATROPIUM BROMIDE AND ALBUTEROL SULFATE 3 ML: 2.5; .5 SOLUTION RESPIRATORY (INHALATION) at 02:07

## 2023-07-20 RX ADMIN — LEVETIRACETAM 1000 MG: 500 SOLUTION ORAL at 11:07

## 2023-07-20 RX ADMIN — ACETYLCYSTEINE 2 ML: 200 SOLUTION ORAL; RESPIRATORY (INHALATION) at 02:07

## 2023-07-20 RX ADMIN — IPRATROPIUM BROMIDE AND ALBUTEROL SULFATE 3 ML: 2.5; .5 SOLUTION RESPIRATORY (INHALATION) at 07:07

## 2023-07-20 RX ADMIN — AMOXICILLIN 1000 MG: 500 CAPSULE ORAL at 11:07

## 2023-07-20 RX ADMIN — LEVETIRACETAM 1000 MG: 500 SOLUTION ORAL at 09:07

## 2023-07-20 RX ADMIN — IPRATROPIUM BROMIDE AND ALBUTEROL SULFATE 3 ML: 2.5; .5 SOLUTION RESPIRATORY (INHALATION) at 09:07

## 2023-07-20 NOTE — PT/OT/SLP PROGRESS
"Occupational Therapy   Treatment    Name: Marely Hamilton  MRN: [id number]  Admitting Diagnosis:  Gastrointestinal hemorrhage associated with duodenal ulcer  9 Days Post-Op    Recommendations:     Discharge Recommendations: nursing facility, skilled  Discharge Equipment Recommendations:  to be determined by next level of care  Barriers to discharge:  Decreased caregiver support, Inaccessible home environment    Assessment:     Marely Hamilton is a 58 y.o. female with a medical diagnosis of Gastrointestinal hemorrhage associated with duodenal ulcer.  She presents with the following performance deficits affecting function: impaired endurance, weakness, impaired sensation, impaired self care skills, gait instability, impaired functional mobility, impaired balance, decreased lower extremity function, decreased upper extremity function, decreased coordination, impaired cardiopulmonary response to activity.     Rehab Prognosis:  Good; patient would benefit from acute skilled OT services to address these deficits and reach maximum level of function.       Plan:     Patient to be seen 3 x/week to address the above listed problems via self-care/home management, therapeutic activities, therapeutic exercises  Plan of Care Expires: 08/11/23  Plan of Care Reviewed with: patient    Subjective     Chief Complaint: "I quit drinking"  Patient/Family Comments/goals: Maximize functional independence  Pain/Comfort:  Pain Rating 1: 0/10  Pain Rating Post-Intervention 1: 0/10    Objective:     Communicated with: NSG prior to session.  Patient found HOB elevated with telemetry, oxygen, bowel management system, NG tube, pulse ox (continuous) upon OT entry to room.    General Precautions: Standard, aspiration, fall    Orthopedic Precautions:N/A  Braces: N/A  Respiratory Status: Nasal cannula     Occupational Performance:     Bed Mobility:    Patient completed Rolling/Turning to Left with  maximal assistance and 2 persons  Patient completed " Rolling/Turning to Right with maximal assistance and 2 persons  Patient completed Scooting/Bridging with total assistance and 2 persons  Patient completed Supine to Sit with total assistance and 2 persons  Patient completed Sit to Supine with total assistance and 2 persons     Functional Mobility/Transfers:  Pt sat EOB x ~15 minutes. Unassisted pt required CGA-max A to maintain sitting balance. Pt w/ firm cushion to righ side able to maintain seated balance w/ CGA using R elbow.     Activities of Daily Living:  Grooming: total assistance to comb mats from hair and wash face.       [facility] 6 Click ADL: 7    Treatment & Education:  Role of OT, goals, POC    Patient left HOB elevated with all lines intact, call button in reach, and NSG notified    GOALS:   Multidisciplinary Problems       Occupational Therapy Goals          Problem: Occupational Therapy    Goal Priority Disciplines Outcome Interventions   Occupational Therapy Goal     OT, PT/OT Ongoing, Progressing    Description: Goals to be met by: 7/28/2023     Patient will increase functional independence with ADLs by performing:    UE Dressing with Minimal Assistance.  LE Dressing with Moderate Assistance.  Grooming while EOB with Minimal Assistance.  Toileting from bedside commode with Maximum Assistance for hygiene and clothing management.   Supine to sit with Moderate Assistance.  Stand pivot transfers with Maximum Assistance with or without AD.  Toilet transfer to bedside commode with Maximum Assistance with or without AD.                         Time Tracking:     OT Date of Treatment: 07/20/23  OT Start Time: 1327  OT Stop Time: 1406  OT Total Time (min): 39 min    Billable Minutes:Self Care/Home Management 28  Neuromuscular Re-education 11    OT/DC: OT          7/20/2023

## 2023-07-20 NOTE — PLAN OF CARE
Problem: Adult Inpatient Plan of Care  Goal: Absence of Hospital-Acquired Illness or Injury  Intervention: Identify and Manage Fall Risk  Flowsheets (Taken 7/20/2023 0714)  Safety Promotion/Fall Prevention: assistive device/personal item within reach  Intervention: Prevent Skin Injury  Flowsheets (Taken 7/20/2023 0714)  Body Position: weight shifting  Intervention: Prevent and Manage VTE (Venous Thromboembolism) Risk  Flowsheets (Taken 7/20/2023 0714)  Activity Management: Rolling - L1  Goal: Optimal Comfort and Wellbeing  Intervention: Provide Person-Centered Care  Flowsheets (Taken 7/20/2023 0714)  Trust Relationship/Rapport:   care explained   reassurance provided   questions encouraged   choices provided   emotional support provided   empathic listening provided   thoughts/feelings acknowledged   questions answered   Pt in bed with no acute distress noted at this time.  Assisted pt with bedtime care.  Bed locked and at lowest position.  Call light in hand.  Frequent assessments ongoing

## 2023-07-20 NOTE — PROGRESS NOTES
[facility] - Telemetry [facility] Medicine  Progress Note    Patient Name: Marely Hamilton  MRN: [id number]  Patient Class: IP- Inpatient   Admission Date: 7/5/2023  Length of Stay: 15 days  Attending Physician: Jermain Coates MD  Primary Care Provider: Viktor Ross MD        Subjective:     Principal Problem:Gastrointestinal hemorrhage associated with duodenal ulcer  Acute Condition:         HPI:  Marely Hamilton is a 58 y.o. female with history of alcoholic cirrhosis, seizure disorder, DVT and IJ thrombus with recent admission for large retroperitoneal hemorrhage requiring IR embolization and IVC filter placement who presents for hematochezia. Onset this morning at SNF, alida blood per rectum per chart, sent to ED. Initially normotensive but then severe hypotension prompted initiation of levophed and intubation. Hgb 6.8, 2u pRBC given + 1u FFP ordered. Hgb 8.5 on 7/2. On levo and vaso currently. K 6.6 but renal function at baseline. Repeat pending. No prior EGDs on record.          Overview/Hospital Course:    Patient was seen at bedside, hematochezia still present post op by IR. Patient has required many transfusions of pRBCs and platelets due to ongoing down trending of Hgb. No clear source of ongoing hemorrhage by imaging. Patient has continued to require pressors to maintain MAP >65. Discussed with GI and IR regarding active bleeding post op, IR indicated there is not much else to do from their end bc they embolized a significant part of GDA. Pt is off of pressor requirement and not actively bleeding. GI re evaluated pt via EGD and found no sources of active bleeding. Pt is extubated and currently on BIPAP requirement due to de saturation in the 80's. Pt is to be seen by speech and PT/OT. Clear mucinous secretions via suction, chest physiotherapy, and cough assist device. Nebs to help with breathing as well. Pt is off BIPAP and currently on comfort flow. Triple therapy (amoxicillin, clarithromycin)  started for 14 days to treat for positive H pylori serology. Pt also had a NG tube placed so we may begin tube feedings. CxR, EKG, and ABG displayed no reason for tachycardia. Pt becomes anxious when seen by different provider teams. Remove art line and consult for midline placement. Continue to wean comfort flow as tolerated. Ensure pt is working with PT/OT/Speech for continuous improvement. Consult psych regarding patient history of bipolar disorder.         Interval History/Significant Events: Wean comfort flow as tolerated. Ensure patient is working with PT/OT/Speech for continuous improvement. Consult psych regarding psych history     7/16   history of alcoholic cirrhosis, seizure disorder, DVT and IJ thrombus with recent admission for large retroperitoneal hemorrhage requiring IR embolization and IVC filter placement who presents for hematochezia. s/p GI and IR in which they clipped (GI) and embolized (IR) possible sources of duodenal ulcer bleeding.  Hb stable  s/p extubation. sats 92% on 5L of NC.  CX ray - Detrimental changes since the previous examination including interval increase in pulmonary vascularity and bilateral diffuse interstitial pulmonary parenchymal opacification, progression of abnormal opacification at the left lung base, and development of small right-sided pleural effusion.   findings would be consistent with congestive heart failure.PT/OT recs SNF. BNP , procalcitonin and Echo.  SLP recs NPO .  On NGT feeds. psychiatry following for bipolar disorders.  Recs Zyprexa 5 mg QHS . Ammonia 48 WNL. AAOX 1. Hepatology consulted for cirrhosis. UA ordered   7/17 Saldaña removed. UA +. UC pending. started on IV ceftriaxone. ammonia at 59. on B/L UE mittens as pulled of NGT. Hepatology eval. resumed lactulose . discussed with sister and updated clinical status   7/18  MBS today - started puree nectar thick liquids.  on oxygen 3L via NC.   wean  as tolerated . UC -YEAST 10,000 - 49,999 cfu/ml  Identification pending No other significant isolate. hydroxyzine  PRN discontinued  due to it not being safe in delirium. oriented to person, hospital and year   7/19 SLP recs - tolerating Puree Diet - IDDSI Level 4, Mildly thick/Nectar thick liquids - IDDSI Level 2 .stool output 600ml/ monitor on lactulose . improved mentation AAOX 3      Interval History: Still seems confused. H and H stable.    Review of Systems  Objective:     Vital Signs (Most Recent):  Temp: 97.3 °F (36.3 °C) (07/20/23 0815)  Pulse: 88 (07/20/23 0815)  Resp: 20 (07/20/23 0815)  BP: (!) 118/59 (07/20/23 0815)  SpO2: (!) 93 % (07/20/23 0815) Vital Signs (24h Range):  Temp:  [97 °F (36.1 °C)-99.4 °F (37.4 °C)] 97.3 °F (36.3 °C)  Pulse:  [] 88  Resp:  [16-20] 20  SpO2:  [92 %-96 %] 93 %  BP: (118-139)/(57-63) 118/59     Weight: 61.3 kg (135 lb 2.3 oz)  Body mass index is 24.72 kg/m².    Intake/Output Summary (Last 24 hours) at 7/20/2023 0922  Last data filed at 7/20/2023 0706  Gross per 24 hour   Intake --   Output 1100 ml   Net -1100 ml         Physical Exam  Constitutional:       Appearance: She is ill-appearing and toxic-appearing.   HENT:      Head: Normocephalic.   Eyes:      General: Scleral icterus present.   Neck:      Thyroid: No thyroid mass or thyroid tenderness.      Vascular: No carotid bruit or hepatojugular reflux.   Cardiovascular:      Rate and Rhythm: Normal rate.      Pulses: Decreased pulses.      Heart sounds: Heart sounds are distant.   Chest:      Chest wall: No mass, lacerations or deformity.   Breasts:     Right: No swelling.      Left: No swelling.   Abdominal:      General: Abdomen is flat.      Palpations: Abdomen is rigid.      Hernia: No hernia is present.   Genitourinary:     Vagina: Normal.   Musculoskeletal:      Cervical back: Rigidity present.   Lymphadenopathy:      Cervical: No cervical adenopathy.   Skin:     Coloration: Skin is jaundiced.      Findings: Ecchymosis present.   Neurological:      Mental  Status: She is unresponsive.           Significant Labs: All pertinent labs within the past 24 hours have been reviewed.  CBC:   Recent Labs   Lab 07/19/23  0350 07/20/23  0411   WBC 3.09* 3.17*   HGB 8.0* 7.9*   HCT 26.2* 24.8*   PLT 34* 40*       Significant Imaging: I have reviewed all pertinent imaging results/findings within the past 24 hours.      Assessment/Plan:      * Gastrointestinal hemorrhage associated with duodenal ulcer    as  above        Dysphagia  7/18  MBS today -started puree nectar thick liquids.        Hypoxia   7/16 sats 92% on 5L of NC.  CX ray - Detrimental changes since the previous examination including interval increase in pulmonary vascularity and bilateral diffuse interstitial pulmonary parenchymal opacification, progression of abnormal opacification at the left lung base, and development of small right-sided pleural effusion.   findings would be consistent with congestive heart failure.PT/OT recs SNF. BNP , procalcitonin and Echo.     H. pylori infection    positive serology for H pylori, begin triple therapy with clarithromycin,amoxicillin, and PPI for 14 days       Hematochezia    - GI EGD clipped duodenal ulcers for IR reference  - Transfuse blood products for active bleeding   - trend CBC and follow  - IR embolized GDA for duodenal hyperemia   -- GI re evaluated site of duodenal ulcers, no noted active bleeding  -- Continue PPI BID for 8 weeks, then once daily after that  --Pt had positive serology for H pylori, begin triple therapy with clarithromycin,amoxicillin, and PPI for 14 days      Acute blood loss anemia     - Maintain IV access with 2 large bore Ivs  - Intravascular resuscitation/support with IVFs   - Hold all NSAIDs and anticoagulants, unless contraindicated.  - Please correct any coagulopathy with platelets and FFP for goal of platelets >50K and INR <2.0  - Please notify GI team if there is significant change in patient's clinical status  - To date, patient has required  at least 21 units of pRBCs to maintain Hgb >7     7/16     Patient's with macrocytic anemia.. Hemoglobin stable. Etiology likely due to acute blood loss.  Current CBC reviewed-    Recent Labs   Lab 07/14/23  0752 07/15/23  0350 07/16/23  0825   HGB 8.6* 8.1* 8.0*         Component Value Date/Time     (H) 07/16/2023 0825    RDW 24.7 (H) 07/16/2023 0825    IRON 132 05/18/2023 1527    FERRITIN 110 05/18/2023 1527    RETIC 3.9 (H) 06/05/2023 0935    FOLATE 16.2 05/31/2023 1250    BDNGDQBI65 >2000 (H) 05/31/2023 1250    OCCULTBLOOD Negative 09/19/2015 0137     Monitor CBC and transfuse if H/H drops below 7/21.        Retroperitoneal bleed  Retroperitoneum: No significant adenopathy.  Similar sized left retroperitoneal hematoma measuring 11 x 9 cm (series 5, image 100; previously 11 x 9 cm).  The left iliacus collection also measures similar to prior at 5.4 cm (series 5, image 127; previously 5.6 cm).  Layering hyperdensity within these collections suggesting different ages in blood products.  No significant volume active extravasation into these collections  - CT-A demonstrated stable retroperitoneal hematoma. No need for intervention at this time.          Supratherapeutic INR  patient with INR   Recent Labs   Lab 07/17/23  0619 07/18/23  0525 07/19/23  0350   INR 1.9* 1.7* 1.6*   . INR is supratherapeutic. secondary to coagulopathy from liver disease.monitor      UTI (urinary tract infection)  7/17 Saldaña removed. UA +. UC pending. started on IV ceftriaxone.  7/18 UC -YEAST 10,000 - 49,999 cfu/ml Identification pending No other significant isolate    Bipolar 1 disorder     - Consult psych regarding Bipolar 1 disorder history       Hepatic encephalopathy   ammonia 190s on admit -->90s . AAOX 1. no lactulose give due to GI bleed  repeat ammonia. Hepatolog eval  with     MELD 3.0: 21 at 7/17/2023  6:19 AM  MELD-Na: 18 at 7/17/2023  6:19 AM  Calculated from:  Serum Creatinine: 0.4 mg/dL (Using min of 1 mg/dL) at  7/17/2023  6:19 AM  Serum Sodium: 143 mmol/L (Using max of 137 mmol/L) at 7/17/2023  6:19 AM  Total Bilirubin: 3.0 mg/dL at 7/17/2023  6:19 AM  Serum Albumin: 2.2 g/dL at 7/17/2023  6:19 AM  INR(ratio): 1.9 at 7/17/2023  6:19 AM  Age at listing (hypothetical): 58 years  Sex: Female at 7/17/2023  6:19 AM  7/16 Ammonia 48 WNL. AAOX 1. Hepatology consulted for cirrhosis.    7/17  Hepatology eval. resumed lactulose.  7/18  hydroxyzine  PRN  discontinued  due to it not being safe in delirium    Severe protein-calorie malnutrition  Nutrition consulted. Most recent weight and BMI monitored-     Measurements:  Wt Readings from Last 1 Encounters:   07/16/23 62.7 kg (138 lb 4.4 oz)   Body mass index is 25.29 kg/m².    Patient has been screened and assessed by RD.    Malnutrition Type:  Context: social/environmental circumstances  Level: severe    Malnutrition Characteristic Summary:  Energy Intake (Malnutrition): less than or equal to 75% for greater than or equal to 1 month  Subcutaneous Fat (Malnutrition): severe depletion  Muscle Mass (Malnutrition): severe depletion      History of seizure  on Keppra via NGT       Thrombocytopenia  with cirrhosis   Patient with thrombocytopenia   Recent Labs   Lab 07/17/23  0619 07/18/23  0525 07/19/23  0350   PLT 34* 32* 34*   . Platelet counts stable  .monitor      Cirrhosis, Laennec's  alcoholic cirrhosis  MELD 3.0: 20 at 7/16/2023  4:51 AM  MELD-Na: 17 at 7/16/2023  4:51 AM  Calculated from:  Serum Creatinine: 0.4 mg/dL (Using min of 1 mg/dL) at 7/16/2023  4:51 AM  Serum Sodium: 145 mmol/L (Using max of 137 mmol/L) at 7/16/2023  4:51 AM  Total Bilirubin: 3.0 mg/dL at 7/16/2023  4:51 AM  Serum Albumin: 2.3 g/dL at 7/16/2023  4:51 AM  INR(ratio): 1.8 at 7/15/2023  3:50 AM  Age at listing (hypothetical): 58 years  Sex: Female at 7/16/2023  4:51 AM     Recent Labs   Lab 07/16/23  1201   AMMONIA 48     Strict input /output monitor, daily weights        VTE Risk Mitigation (From admission,  onward)         Ordered     Place sequential compression device  Until discontinued         07/14/23 1024     Reason for No Pharmacological VTE Prophylaxis  Once        Question:  Reasons:  Answer:  Active Bleeding    07/05/23 1334     IP VTE HIGH RISK PATIENT  Once         07/05/23 1334                Discharge Planning   BRAYAN: 7/21/2023     Code Status: DNR   Is the patient medically ready for discharge?: No    Reason for patient still in hospital (select all that apply): Patient unstable  Discharge Plan A: Skilled Nursing Facility   Discharge Delays: None known at this time              Jermain Coates MD  Department of Hospital Medicine   [facility] - Tele[facility] Step[facility]

## 2023-07-20 NOTE — PT/OT/SLP PROGRESS
"Physical Therapy Co-Treatment with OT      Patient Name:  Marely Hamilton   MRN:  [id number]    Recommendations:     Discharge Recommendations: nursing facility, skilled  Discharge Equipment Recommendations: wheelchair, bedside commode, bath bench, grab bar (walk in shower with grab bars)  Barriers to discharge: None    Assessment:     Marely Hamilton is a 58 y.o. female admitted with a medical diagnosis of Gastrointestinal hemorrhage associated with duodenal ulcer.  She presents with the following impairments/functional limitations: weakness, impaired balance, decreased safety awareness, impaired endurance, impaired functional mobility, decreased lower extremity function, decreased coordination, impaired cognition placing her below baseline mobility. Patient demonstrated fluctuating cognitive status initially during session however had increased participation and responsiveness as session continued; was able to tolerate therapy and is still very slowly progressing towards goals. Continue to recommend discharge to SNF at this time.     Co-treatment with OT performed due to patient complexity and deficits, requiring two skilled therapists to appropriately and safely assess patient's strength and endurance while facilitating functional tasks in addition to accommodating for patient's activity tolerance.     Rehab Prognosis: Good; patient would benefit from acute skilled PT services to address these deficits and reach maximum level of function.    Recent Surgery: Procedure(s) (LRB):  EGD (ESOPHAGOGASTRODUODENOSCOPY) (N/A) 9 Days Post-Op    Plan:     During this hospitalization, patient to be seen 3 x/week to address the identified rehab impairments via therapeutic activities, therapeutic exercises, neuromuscular re-education and progress toward the following goals:    Plan of Care Expires:  08/10/23    Subjective     Chief Complaint: mobility deficits  Patient/Family Comments/goals: "I knew you'd be back!" (to " therapist). Patient initially non-responsive to mobility suggestions however following emotional discussion about patient's feelings, patient agreed to progress mobility. Patient offered various scattered conversation topics during session ranging from medication concerns, missing her family, offering gratitude to [facility] for their level of care, quitting drinking with her friend, and her psychiatrist.     Pain/Comfort:  Pain Rating 1: 0/10  Pain Rating Post-Intervention 1: 0/10      Objective:     Communicated with RN prior to session.  Patient found supine with telemetry, oxygen, bowel management system, NG tube, pulse ox (continuous) upon PT entry to room.     General Precautions: Standard, aspiration, fall  Orthopedic Precautions: N/A  Braces: N/A  Respiratory Status: Nasal cannula, flow 1.5 L/min     Functional Mobility:  Bed Mobility:     Rolling Left:  maximal assistance. Patient able to initiate some LE motion.  Rolling Right: maximal assistance. Patient able to initiate some LE motion.  Scooting: total assistance and of 2 persons  Supine to Sit: total assistance and of 2 persons  Sit to Supine: total assistance and of 2 persons    Cognition  - Commands: follows 1 step commands with 25% accuracy. Improves with additional verbal encouragement and external cueing.   - Communication: responsive to direct questions with repetition, displayed confusion with questions. More responsive to mobility commands than conversational questions.   - Eyes open 100% of session    Balance  -Seated Static Balance CGA to Max with patients arm abducted using bed to assist herself with maintaining upright. Patient required verbal and tactile cues to engage R arm and maintain upright as patient tended to lean to R side.   -Seated Static Balance CGA to SBA with patient's elbow bent on cushion. Patient able to engage periscapular and UE musculature more easily in this positioning requiring less physical assistance.     AM-PAC 6 CLICK  MOBILITY  Turning over in bed (including adjusting bedclothes, sheets and blankets)?: 2  Sitting down on and standing up from a chair with arms (e.g., wheelchair, bedside commode, etc.): 1  Moving from lying on back to sitting on the side of the bed?: 2  Moving to and from a bed to a chair (including a wheelchair)?: 1  Need to walk in hospital room?: 1  Climbing 3-5 steps with a railing?: 1  Basic Mobility Total Score: 8       Treatment & Education:  Seated EOB AAROM LE exercises: knee extension, ankle DF/PF, hip flexion. Patient displayed good effort with Verbal and tactile cueing and repeated directions.   Sitting EOB balance challenges with patient attempting to maintain and reinitiate upright sitting with perturbations.     Patient left supine with all lines intact, call button in reach, and RN notified.    GOALS:   Multidisciplinary Problems       Physical Therapy Goals          Problem: Physical Therapy    Goal Priority Disciplines Outcome Goal Variances Interventions   Physical Therapy Goal     PT, PT/OT Ongoing, Progressing     Description: Goals to be met by: 8/10/23     Patient will increase functional independence with mobility by performin. Supine to sit with Moderate Assistance  2. Rolling to Left  with Minimal Assistance.  3. Sit to stand transfer with Moderate Assistance  4. Bed to chair transfer with Maximum Assistance   5. Sitting at edge of bed x10 minutes with Contact Guard Assistance to perform functional activities.   6. Lower extremity exercise program x10 reps per handout, with assistance as needed to improve strength for functional activities.                          Time Tracking:     PT Received On: 23  PT Start Time: 1327     PT Stop Time: 1406  PT Total Time (min): 39 min     Billable Minutes: Therapeutic Activity 25 and Therapeutic Exercise 14    Treatment Type: Treatment  PT/PTA: PT     Number of PTA visits since last PT visit: 0     2023

## 2023-07-20 NOTE — PROGRESS NOTES
[facility] - Telemetry Stepdown  Adult Nutrition  Progress Note    SUMMARY       Recommendations    1. Continue diet (per SLP) as tolerated.   2. Continue current TF regimen of Isosource 1.5 @ 50 mL/hr - meeting 98% EEN, 90% EPN.    - Wean as/if PO intake improves.   3. RD to monitor & follow-up.    Goals: Will meet % EEN/EPN by next RD f/u.  Nutrition Goal Status: goal met  Communication of RD Recs: reviewed with RN    Assessment and Plan    Severe protein-calorie malnutrition    Malnutrition Type:  Context: social/environmental circumstances  Level: severe    Related to (etiology):   Decline in ADL's, physiological cause    Signs and Symptoms (as evidenced by):   Findings of NFPE and poor PO intake    Malnutrition Characteristic Summary:  Energy Intake (Malnutrition): less than or equal to 75% for greater than or equal to 1 month  Subcutaneous Fat (Malnutrition): severe depletion  Muscle Mass (Malnutrition): severe depletion    Interventions/Recommendations (treatment strategy):  Collaboration of nutrition care with other providers  EN    Nutrition Diagnosis Status:   Continues    Malnutrition Assessment    Malnutrition Context: social/environmental circumstances  Malnutrition Level: severe    Energy Intake (Malnutrition): less than or equal to 75% for greater than or equal to 1 month  Subcutaneous Fat (Malnutrition): severe depletion  Muscle Mass (Malnutrition): severe depletion   Orbital Region (Subcutaneous Fat Loss): severe depletion  Upper Arm Region (Subcutaneous Fat Loss): severe depletion  Thoracic and Lumbar Region: severe depletion   [place] Region (Muscle Loss): severe depletion  Clavicle Bone Region (Muscle Loss): severe depletion  Clavicle and Acromion Bone Region (Muscle Loss): severe depletion  Scapular Bone Region (Muscle Loss): severe depletion  Patellar Region (Muscle Loss): severe depletion  Anterior Thigh Region (Muscle Loss): severe depletion  Posterior Calf Region (Muscle Loss): severe  "depletion     Reason for Assessment    Reason For Assessment: RD follow-up  Diagnosis:  (-)  Relevant Medical History: severe protein-kcal malnutrition, ETOH abuse in remission, seizure disorder  Interdisciplinary Rounds: did not attend    General Information Comments: Diet per SLP; pt consuming 0-25% of meals - TFs also infusing via NGT. + Weight loss PTA & severe wasting on physical exam (7/6). Pt meets criteria for severe malnutrition. Please see PES statement for details.   Nutrition Discharge Planning: Pending clinical course    Nutrition/Diet History    Patient Reported Diet/Restrictions/Preferences: general  Spiritual, Cultural Beliefs, Holiness Practices, Values that Affect Care: no  Supplemental Drinks or Food Habits: Ensure Plus  Food Allergies: NKFA  Factors Affecting Nutritional Intake: NPO    Anthropometrics    Temp: 97.3 °F (36.3 °C)  Height: 5' 2" (157.5 cm)  Height (inches): 62 in  Weight Method: Bed Scale  Weight: 61.3 kg (135 lb 2.3 oz)  Weight (lb): 135.14 lb  Ideal Body Weight (IBW), Female: 110 lb  % Ideal Body Weight, Female (lb): 122.85 %  BMI (Calculated): 24.7  BMI Grade: 18.5-24.9 - normal    Lab/Procedures/Meds    Pertinent Labs Reviewed: reviewed  Pertinent Labs Comments: -  Pertinent Medications Reviewed: reviewed  Pertinent Medications Comments: Lactulose    Estimated/Assessed Needs    Weight Used For Calorie Calculations: 61.3 kg (135 lb 2.3 oz)    Energy Calorie Requirements (kcal): 8433-1811 kcal/d  Energy Need Method: Kcal/kg (30-35 kcal/kg)    Protein Requirements: 92 g/d (1.5 g/kg)  Weight Used For Protein Calculations: 61.3 kg (135 lb 2.3 oz)    Estimated Fluid Requirement Method: other (see comments) (Per MD or 1 mL/kcal)  RDA Method (mL): 1839    Nutrition Prescription Ordered    Current Diet Order: Pureed w/ nectar thick liquids  Current Nutrition Support Formula Ordered: Isosource 1.5  Current Nutrition Support Rate Ordered: 50 mL/hr    Evaluation of Received Nutrient/Fluid " Intake    Enteral Calories (kcal): 1800  Enteral Protein (gm): 82  Enteral (Free Water) Fluid (mL): 917  Free Water Flush Fluid (mL): 1000    % Kcal Needs: 98%  % Protein Needs: 90%    I/O: +1L since 7/6    Energy Calories Required: meeting needs  Protein Required: meeting needs  Fluid Required: other (see comments) (Per MD)    Comments: LBM: 7/18    Tolerance: tolerating    Nutrition Risk    Level of Risk/Frequency of Follow-up:  (1x/week)     Monitor and Evaluation    Food and Nutrient Intake: enteral nutrition intake  Food and Nutrient Adminstration: enteral and parenteral nutrition administration  Knowledge/Beliefs/Attitudes: beliefs and attitudes, food and nutrition knowledge/skill  Physical Activity and Function: nutrition-related ADLs and IADLs  Anthropometric Measurements: body mass index, weight change, weight, height/length  Biochemical Data, Medical Tests and Procedures: lipid profile, inflammatory profile, glucose/endocrine profile, gastrointestinal profile, electrolyte and renal panel  Nutrition-Focused Physical Findings: overall appearance     Nutrition Follow-Up    RD Follow-up?: Yes

## 2023-07-20 NOTE — PROGRESS NOTES
"CONSULTATION LIAISON PSYCHIATRY PROGRESS NOTE    Patient Name: Marely Hamilton  MRN: [id number]  Patient Class: IP- Inpatient  Admission Date: 7/5/2023  Attending Physician: Jermain Coates MD      SUBJECTIVE:   Marely Hamilton is a 58 y.o. female with past psychiatric history of bipolar disorder, alcohol use disorder & past pertinent medical history of seizure disorder, alcohol induced hepatic cirrhosis presents to the ED/admitted to the hospital for Gastrointestinal hemorrhage associated with duodenal ulcer. Patient presented from SNF (when she experienced alida blood per rectum per chart review), for which she was recently discharged to when she presented to the hospital on for hepatic encephalopathy c/b retroperitoneal bleed (s/p ICV filter and IR embolization).        Psychiatry consulted for "Bipolar disorder history"    Today, patient was oriented to person and initially oriented to place but when asked later on reassessment, patient was disoriented to place. When asked age, she gave her date of birth. When asked further questions, she gave answers that were disorganized and bizarre. Difficult to engage with patient on this assessment.      Collateral:  Spoke with patient's outpatient psychiatrist, Dr. Coates on 7/20 who recommend that we continue to hold the lithium due to concerns of an adverse effect of it causing hypercalcemia.      OBJECTIVE:    Mental Status Exam:  General Appearance: cachetic, disheveled, chronically ill  dressed in hospital garb, appears older than stated age  Behavior: cooperative, appropriate eye-contact, minimal responses  Involuntary Movements and Motor Activity:  Patient with chewing movements of mouth at rest  Gait and Station: unable to assess - patient lying down or seated  Speech and Language: decreased spontaneity, increased latency of response, responds to questions minimally/briefly  Mood: "good"  Affect: constricted, Intense  Thought Process and Associations: bizarre, " tangential, scattered  Thought Content and Perceptions:: no suicidal ideation, no homicidal ideation  Sensorium and Orientation: alert, delirious, waxing and waning, oriented to person and place, oriented to year  Recent and Remote Memory:  impaired, forgetful  Attention and Concentration: impaired, easily distractible  Fund of Knowledge: Unable to Formally Assess  Insight: impaired  Judgment: impaired, limited    CAM ICU positive? yes      ASSESSMENT & RECOMMENDATIONS   Delirium  Hx of Bipolar Disorder     PSYCH MEDICATIONS  Continue zyprexa 5 mg QHS  Continue to hold the lithium due to concerns in patients medical history of an adverse effect of it causing hypercalcemia  PRN: Does not appear to be requiring PRNs at this time, in future if patient does become agitated recommend Zyprexa 5 mg PO/IM.   In future, once patient stabilized, recommend restarting home lithium, likely in outpatient setting  Monitor QTc with daily EKGs while on zyprexa. Recommend Repeat EKG to monitor QTcDELIRIUM    DELIRIUM BEHAVIOR MANAGEMENT  Continue to Limit or Discontinue use of Narcotics, Benzos and Anti-cholinergic medications as they may worsen delirium.  PLEASE utilize CHEMICAL restraints with PRN meds first for agitation. Minimize use of PHYSICAL restraints OR have periods of being out of physical restraints if possible.  Keep window shades open and room lit during day and room dim at night in order to promote normal sleep-wake cycles  Encourage family at bedside. [place] patient often to situation, location, date.  Continue medical workup for causative etiology of Delirium        RISK ASSESSMENT  NO NEED FOR PEC Patient not in any imminent danger of hurting self or others  FOLLOW UP  Will sign off. Patient can follow up with psychiatrist, Dr. Coates.  DISPOSITION - once medically cleared:   Defer to medical team    Please contact ON CALL psychiatry service (24/7) for any acute issues that may arise.    Dr. Mehdi VACA  Psychiatry  [facility]  7/20/2023 9:25 AM        --------------------------------------------------------------------------------------------------------------------------------------------------------------------------------------------------------------------------------------    CONTINUED OBJECTIVE clinical data & findings reviewed and noted for above decision making    Current Medications:   Scheduled Meds:    acetylcysteine 200 mg/ml (20%)  2 mL Nebulization TID WAKE    albuterol-ipratropium  3 mL Nebulization Q6H WAKE    amoxicillin  1,000 mg Per NG tube Q12H    cefTRIAXone (ROCEPHIN) IVPB  1 g Intravenous Q24H    clarithromycin  500 mg Per NG tube Q12H    lactulose  10 g Per NG tube TID    levetiracetam  1,000 mg Per NG tube BID    OLANZapine  5 mg Per NG tube QHS    pantoprazole  40 mg Intravenous BID     PRN Meds: dextrose 10%, dextrose 10%, glucagon (human recombinant), insulin aspart U-100, sodium chloride 0.9%    Allergies:   Review of patient's allergies indicates:   Allergen Reactions    Sulfa (sulfonamide antibiotics) Rash    Codeine Nausea And Vomiting       Vitals  Vitals:    07/20/23 0815   BP: (!) 118/59   Pulse: 88   Resp: 20   Temp: 97.3 °F (36.3 °C)       Labs/Imaging/Studies:  Recent Results (from the past 24 hour(s))   POCT glucose    Collection Time: 07/19/23 11:35 AM   Result Value Ref Range    POCT Glucose 222 (H) 70 - 110 mg/dL   Calcium, ionized    Collection Time: 07/19/23  4:04 PM   Result Value Ref Range    Ionized Calcium 1.11 1.06 - 1.42 mmol/L   POCT glucose    Collection Time: 07/19/23  6:05 PM   Result Value Ref Range    POCT Glucose 157 (H) 70 - 110 mg/dL   APTT    Collection Time: 07/20/23  4:11 AM   Result Value Ref Range    aPTT 31.4 21.0 - 32.0 sec   Magnesium    Collection Time: 07/20/23  4:11 AM   Result Value Ref Range    Magnesium 1.7 1.6 - 2.6 mg/dL   Calcium, ionized    Collection Time: 07/20/23  4:11 AM   Result Value Ref Range    Ionized Calcium  1.18 1.06 - 1.42 mmol/L   CBC Auto Differential    Collection Time: 07/20/23  4:11 AM   Result Value Ref Range    WBC 3.17 (L) 3.90 - 12.70 K/uL    RBC 2.43 (L) 4.00 - 5.40 M/uL    Hemoglobin 7.9 (L) 12.0 - 16.0 g/dL    Hematocrit 24.8 (L) 37.0 - 48.5 %     (H) 82 - 98 fL    MCH 32.5 (H) 27.0 - 31.0 pg    MCHC 31.9 (L) 32.0 - 36.0 g/dL    RDW 25.9 (H) 11.5 - 14.5 %    Platelets 40 (L) 150 - 450 K/uL    MPV 10.7 9.2 - 12.9 fL    Immature Granulocytes 0.3 0.0 - 0.5 %    Gran # (ANC) 2.3 1.8 - 7.7 K/uL    Immature Grans (Abs) 0.01 0.00 - 0.04 K/uL    Lymph # 0.5 (L) 1.0 - 4.8 K/uL    Mono # 0.3 0.3 - 1.0 K/uL    Eos # 0.1 0.0 - 0.5 K/uL    Baso # 0.02 0.00 - 0.20 K/uL    nRBC 0 0 /100 WBC    Gran % 73.3 (H) 38.0 - 73.0 %    Lymph % 14.2 (L) 18.0 - 48.0 %    Mono % 8.8 4.0 - 15.0 %    Eosinophil % 2.8 0.0 - 8.0 %    Basophil % 0.6 0.0 - 1.9 %    Differential Method Automated    Comprehensive Metabolic Panel    Collection Time: 07/20/23  4:11 AM   Result Value Ref Range    Sodium 140 136 - 145 mmol/L    Potassium 3.8 3.5 - 5.1 mmol/L    Chloride 115 (H) 95 - 110 mmol/L    CO2 21 (L) 23 - 29 mmol/L    Glucose 170 (H) 70 - 110 mg/dL    BUN 9 6 - 20 mg/dL    Creatinine 0.4 (L) 0.5 - 1.4 mg/dL    Calcium 7.9 (L) 8.7 - 10.5 mg/dL    Total Protein 4.9 (L) 6.0 - 8.4 g/dL    Albumin 2.2 (L) 3.5 - 5.2 g/dL    Total Bilirubin 2.9 (H) 0.1 - 1.0 mg/dL    Alkaline Phosphatase 177 (H) 55 - 135 U/L    AST 27 10 - 40 U/L    ALT 12 10 - 44 U/L    eGFR >60.0 >60 mL/min/1.73 m^2    Anion Gap 4 (L) 8 - 16 mmol/L   Protime-INR    Collection Time: 07/20/23  4:11 AM   Result Value Ref Range    Prothrombin Time 15.8 (H) 9.0 - 12.5 sec    INR 1.5 (H) 0.8 - 1.2     Imaging Results              CTA Acute GI [place], Abdomen and Pelvis (Final result)  Result time 07/05/23 17:15:19      Final result by Mumtaz Garsia MD (07/05/23 17:15:19)                   Impression:      Images are degraded by significant streak and motion  artifacts.    Stable large left retroperitoneal hematoma and left iliacus hematoma.  No contrast extravasation within the hematomas or bowel to indicate active arterial bleed.    Hepatic cirrhosis.  Diffuse wall thickening of the stomach and colon, which can be seen in the setting of hepatic dysfunction.  However, superimposed inflammatory processes are not excluded.    Multiple, nondilated, distended fluid-filled loops of small bowel without a definite transition point.  Stool and air seen within the colon, this is suggestive of ileus.    Small left pleural effusion with associated compressive atelectasis.    Cholelithiasis.    Cardiomegaly.    Electronically signed by resident: Emiliano Mcmahon  Date:    07/05/2023  Time:    16:29    Electronically signed by: Mumtaz Garsia MD  Date:    07/05/2023  Time:    17:15               Narrative:    EXAMINATION:  CTA ACUTE GI BLEED, ABDOMEN AND PELVIS    CLINICAL HISTORY:  GI bleed;    TECHNIQUE:  Initial noncontrast  images were obtained of the abdomen and pelvis.  CT axial angiography images were then obtained from the lung bases to the pubic symphysis following the intravenous administration of 100 of Omnipaque 350 with delayed images obtained per GI bleeding protocol.  Sagittal and coronal reformats were provided.    COMPARISON:  CTA GI bleed 06/13/2023    FINDINGS:  Images are degraded by significant streak and motion artifacts.    Lungs: Interval decrease in size of the small right pleural effusion with associated compressive atelectasis.  Resolution of the left pleural effusion.    Heart: Enlarged.  No pericardial effusion.    Liver: Nodular contour of the liver.  No focal abnormality.    Gallbladder: Multiple calcified gallstones.  No pericholecystic inflammatory changes.    Bile ducts: No intrahepatic or extrahepatic biliary ductal dilatation.    Spleen: Normal size.    Pancreas: No mass. No ductal dilatation. No peripancreatic fat stranding.    Adrenals: No  significant abnormality    Renal/Ureters: Bilateral cortical thinning.  No hydronephrosis.  Proximal ureters are normal caliber.  The distal ureters are difficult to evaluate due to streak artifact.  Bladder is decompressed with Saldaña catheter in place.    Reproductive: Uterus appears without significant abnormality.  No adnexal mass, noting limited evaluation due to significant streak artifact.    Stomach/Bowel: Stomach is distended with ingested contents with thickened rugal folds.  Small bowel is distended with multiple nondilated fluid-filled loops.  No transition point.  Appendix is normal.  Diffuse wall thickening throughout the colon with mild stool burden.  Diffuse wall thickening of the rectum.  No contrast extravasation to indicate active arterial bleed.    Peritoneum: Stable large left retroperitoneal hematoma measuring 11.5 x 7.8 x 12.6 cm.  No contrast extravasation to indicate active bleed within the hematoma.  Additional smaller hematoma anterior to the left iliacus muscle, which appears stable compared to prior CTA.  No free fluid. No intraperitoneal free air.    Lymph Nodes: Multiple prominent mesenteric and retroperitoneal lymph nodes.    Vasculature: Abdominal aorta tapers normally.  Mild atherosclerosis of the abdominal aorta and its branches.    Bones: Bony mineralization is diminished.  Left hip arthroplasty.  Generalized body wall edema.    Soft Tissues: No significant abnormality.                                       CT Head Without Contrast (Final result)  Result time 07/05/23 15:10:53      Final result by Viktor Desouza MD (07/05/23 15:10:53)                   Impression:      No evidence of acute intracranial pathology.    Volume loss again identified.    Electronically signed by resident: Kenney Rosas  Date:    07/05/2023  Time:    14:45    Electronically signed by: Viktor Desouza  Date:    07/05/2023  Time:    15:10               Narrative:    EXAMINATION:  CT HEAD WITHOUT  CONTRAST    CLINICAL HISTORY:  Mental status change, unknown cause;    TECHNIQUE:  Low dose axial CT images obtained throughout the head without the use of intravenous contrast.  Axial, sagittal and coronal reconstructions were performed.    COMPARISON:  CT head 06/22/2023    FINDINGS:  Intracranial compartment:    Volume loss without evidence of hydrocephalus.    No parenchymal  hemorrhage, edema, mass effect or major vascular distribution infarct.    Decreased attenuation in the periventricular white matter is nonspecific but may reflect mild chronic small vessel ischemic change vs other encephalopathy.    No extra-axial blood or fluid collections.    Skull/extracranial contents (limited evaluation):    No displaced calvarial fracture.    The visualized sinuses and mastoid air cells are clear.                                       X-Ray Chest AP Portable (Final result)  Result time 07/05/23 13:09:11      Final result by Americo Reyes MD (07/05/23 13:09:11)                   Impression:      No significant detrimental interval change in the appearance of the chest since 06/25/2023 is appreciated, with significant improvement as noted above.  No post procedure pneumothorax.      Electronically signed by: Americo Reyes MD  Date:    07/05/2023  Time:    13:09               Narrative:    EXAMINATION:  XR CHEST AP PORTABLE    TECHNIQUE:  One view was obtained.  Note is made of the fact that the thorax is obscured to some extent by opacities external to the patient, particularly defibrillator pads.    COMPARISON:  Comparison is made to the most recent prior chest radiograph of 06/25/2023.    FINDINGS:  Endotracheal tube has been placed, its tip lying just superior to the apex of the aortic arch, well above the katharina.  Left jugular origin vascular catheter is now seen, its tip in the superior vena cava near the junction of the right and left brachiocephalic veins.  Allowing for magnification of the cardiomediastinal  silhouette related to projection, the heart is not significantly enlarged.  Opacity in both inferior hemithoraces seen on the previous examination has resolved, consistent with clearing of airspace consolidation in both mid/lower lung zones and resolution of previously present bilateral pleural fluid.  Lung zones are currently clear and free of significant airspace consolidation or volume loss.  No pleural fluid of any substantial volume is seen on either side.  No pneumothorax.

## 2023-07-20 NOTE — SUBJECTIVE & OBJECTIVE
Interval History: Still seems confused. H and H stable.    Review of Systems  Objective:     Vital Signs (Most Recent):  Temp: 97.3 °F (36.3 °C) (07/20/23 0815)  Pulse: 88 (07/20/23 0815)  Resp: 20 (07/20/23 0815)  BP: (!) 118/59 (07/20/23 0815)  SpO2: (!) 93 % (07/20/23 0815) Vital Signs (24h Range):  Temp:  [97 °F (36.1 °C)-99.4 °F (37.4 °C)] 97.3 °F (36.3 °C)  Pulse:  [] 88  Resp:  [16-20] 20  SpO2:  [92 %-96 %] 93 %  BP: (118-139)/(57-63) 118/59     Weight: 61.3 kg (135 lb 2.3 oz)  Body mass index is 24.72 kg/m².    Intake/Output Summary (Last 24 hours) at 7/20/2023 0922  Last data filed at 7/20/2023 0706  Gross per 24 hour   Intake --   Output 1100 ml   Net -1100 ml         Physical Exam  Constitutional:       Appearance: She is ill-appearing and toxic-appearing.   HENT:      Head: Normocephalic.   Eyes:      General: Scleral icterus present.   Neck:      Thyroid: No thyroid mass or thyroid tenderness.      Vascular: No carotid bruit or hepatojugular reflux.   Cardiovascular:      Rate and Rhythm: Normal rate.      Pulses: Decreased pulses.      Heart sounds: Heart sounds are distant.   Chest:      Chest wall: No mass, lacerations or deformity.   Breasts:     Right: No swelling.      Left: No swelling.   Abdominal:      General: Abdomen is flat.      Palpations: Abdomen is rigid.      Hernia: No hernia is present.   Genitourinary:     Vagina: Normal.   Musculoskeletal:      Cervical back: Rigidity present.   Lymphadenopathy:      Cervical: No cervical adenopathy.   Skin:     Coloration: Skin is jaundiced.      Findings: Ecchymosis present.   Neurological:      Mental Status: She is unresponsive.           Significant Labs: All pertinent labs within the past 24 hours have been reviewed.  CBC:   Recent Labs   Lab 07/19/23  0350 07/20/23  0411   WBC 3.09* 3.17*   HGB 8.0* 7.9*   HCT 26.2* 24.8*   PLT 34* 40*       Significant Imaging: I have reviewed all pertinent imaging results/findings within the past 24  hours.

## 2023-07-20 NOTE — PT/OT/SLP PROGRESS
"Speech Language Pathology Treatment    Patient Name:  Marely Hamilton   MRN:  [id number]  Admitting Diagnosis: Gastrointestinal hemorrhage associated with duodenal ulcer    Recommendations:                 General Recommendations:  Dysphagia therapy  Diet recommendations:  Puree, Nectar Thick   Aspiration Precautions:   1 bite/sip at a time  Assistance with meals and  thickening liquids  Eliminate distractions  Feed only when awake/alert  Meds crushed in puree  No straws  Standard aspiration precautions   General Precautions: Standard, aspiration, fall  Communication strategies:  none    Assessment:     Marely Hamilton is a 58 y.o. female with an SLP diagnosis of mild Oropharyngeal dysphagia characterized by high risk for aspiration with thin liquids and residue with purees. Cognitive status and impulsivity also increase pt aspiration risk during meals. SLP to continue to follow.     Subjective     Pt found resting in bed upon SLP entry into room. Pt agreeable to participate in all aspects of session.      Patient goals: "Okay"     Pain/Comfort:  Pain Rating 1: 0/10    Respiratory Status: Nasal cannula, flow 2 L/min    Objective:     Has the patient been evaluated by SLP for swallowing?   Yes  Keep patient NPO? No   Current Respiratory Status:        Pt seen for ongoing dysphagia therapy. NG tube in place with feeds running. She presented with decreased levels of alertness this date with no purposeful vocalizations across majority of session. Phonation not elicited with automatic speech tasks or basic Y/N questions though she became slightly more verbal as session progressed. Trials of nectar thick liquids fed by SLP resulted in persistent anterior spillage with pt requiring very small boluses to increase oral containment. Bites of puree voluntarily accepted without oral residue or signs of airway compromise. Attempted to engage pt in falsetto though she was unable to follow directions for completion of indirect " dysphagia exercises. Discussed overall impressions, recommendations for continuation of puree diet with nectar thick liquids, and ongoing SLP POC. Pt verbalized understanding but would continue to benefit from ongoing education. Pt left with bed in lowest locked position upon SLP exit.     Goals:   Multidisciplinary Problems       SLP Goals          Problem: SLP    Goal Priority Disciplines Outcome   SLP Goal     SLP Ongoing, Progressing   Description: Speech Pathology Goals  To be met by 7/27/23    1. Pt will participate in ongoing diagnostic dysphagia therapy                              Plan:     Patient to be seen:  3 x/week   Plan of Care expires:  08/12/23  Plan of Care reviewed with:  patient   SLP Follow-Up:  Yes       Discharge recommendations:  nursing facility, skilled   Barriers to Discharge:  None    Time Tracking:     SLP Treatment Date:   07/20/23  Speech Start Time:  1030  Speech Stop Time:  1047     Speech Total Time (min):  17 min    Billable Minutes: Treatment Swallowing Dysfunction 9 and Self Care/Home Management Training 8      07/20/2023

## 2023-07-21 LAB
ALBUMIN SERPL BCP-MCNC: 2.2 G/DL (ref 3.5–5.2)
ALP SERPL-CCNC: 187 U/L (ref 55–135)
ALT SERPL W/O P-5'-P-CCNC: 12 U/L (ref 10–44)
ANION GAP SERPL CALC-SCNC: 7 MMOL/L (ref 8–16)
APTT PPP: 31.4 SEC (ref 21–32)
AST SERPL-CCNC: 29 U/L (ref 10–40)
BASOPHILS # BLD AUTO: 0.02 K/UL (ref 0–0.2)
BASOPHILS NFR BLD: 0.7 % (ref 0–1.9)
BILIRUB SERPL-MCNC: 2.6 MG/DL (ref 0.1–1)
BUN SERPL-MCNC: 9 MG/DL (ref 6–20)
CA-I BLDV-SCNC: 1.2 MMOL/L (ref 1.06–1.42)
CA-I BLDV-SCNC: 1.23 MMOL/L (ref 1.06–1.42)
CALCIUM SERPL-MCNC: 8 MG/DL (ref 8.7–10.5)
CHLORIDE SERPL-SCNC: 115 MMOL/L (ref 95–110)
CO2 SERPL-SCNC: 18 MMOL/L (ref 23–29)
CREAT SERPL-MCNC: 0.4 MG/DL (ref 0.5–1.4)
DIFFERENTIAL METHOD: ABNORMAL
EOSINOPHIL # BLD AUTO: 0.1 K/UL (ref 0–0.5)
EOSINOPHIL NFR BLD: 3.7 % (ref 0–8)
ERYTHROCYTE [DISTWIDTH] IN BLOOD BY AUTOMATED COUNT: 26.2 % (ref 11.5–14.5)
EST. GFR  (NO RACE VARIABLE): >60 ML/MIN/1.73 M^2
GLUCOSE SERPL-MCNC: 145 MG/DL (ref 70–110)
HCT VFR BLD AUTO: 26.3 % (ref 37–48.5)
HGB BLD-MCNC: 8 G/DL (ref 12–16)
IMM GRANULOCYTES # BLD AUTO: 0.01 K/UL (ref 0–0.04)
IMM GRANULOCYTES NFR BLD AUTO: 0.3 % (ref 0–0.5)
INR PPP: 1.4 (ref 0.8–1.2)
LYMPHOCYTES # BLD AUTO: 0.4 K/UL (ref 1–4.8)
LYMPHOCYTES NFR BLD: 13.9 % (ref 18–48)
MAGNESIUM SERPL-MCNC: 1.8 MG/DL (ref 1.6–2.6)
MCH RBC QN AUTO: 31.7 PG (ref 27–31)
MCHC RBC AUTO-ENTMCNC: 30.4 G/DL (ref 32–36)
MCV RBC AUTO: 104 FL (ref 82–98)
MONOCYTES # BLD AUTO: 0.4 K/UL (ref 0.3–1)
MONOCYTES NFR BLD: 11.9 % (ref 4–15)
NEUTROPHILS # BLD AUTO: 2 K/UL (ref 1.8–7.7)
NEUTROPHILS NFR BLD: 69.5 % (ref 38–73)
NRBC BLD-RTO: 0 /100 WBC
PLATELET # BLD AUTO: 46 K/UL (ref 150–450)
PMV BLD AUTO: 10.3 FL (ref 9.2–12.9)
POCT GLUCOSE: 152 MG/DL (ref 70–110)
POCT GLUCOSE: 166 MG/DL (ref 70–110)
POTASSIUM SERPL-SCNC: 4.2 MMOL/L (ref 3.5–5.1)
PROT SERPL-MCNC: 5 G/DL (ref 6–8.4)
PROTHROMBIN TIME: 15.2 SEC (ref 9–12.5)
RBC # BLD AUTO: 2.52 M/UL (ref 4–5.4)
SODIUM SERPL-SCNC: 140 MMOL/L (ref 136–145)
WBC # BLD AUTO: 2.94 K/UL (ref 3.9–12.7)

## 2023-07-21 PROCEDURE — 97112 NEUROMUSCULAR REEDUCATION: CPT

## 2023-07-21 PROCEDURE — 63600175 PHARM REV CODE 636 W HCPCS: Performed by: STUDENT IN AN ORGANIZED HEALTH CARE EDUCATION/TRAINING PROGRAM

## 2023-07-21 PROCEDURE — 20600001 HC STEP DOWN PRIVATE ROOM

## 2023-07-21 PROCEDURE — 94668 MNPJ CHEST WALL SBSQ: CPT

## 2023-07-21 PROCEDURE — 97530 THERAPEUTIC ACTIVITIES: CPT

## 2023-07-21 PROCEDURE — 85730 THROMBOPLASTIN TIME PARTIAL: CPT

## 2023-07-21 PROCEDURE — 99232 SBSQ HOSP IP/OBS MODERATE 35: CPT | Mod: ,,, | Performed by: INTERNAL MEDICINE

## 2023-07-21 PROCEDURE — 83735 ASSAY OF MAGNESIUM: CPT

## 2023-07-21 PROCEDURE — 80053 COMPREHEN METABOLIC PANEL: CPT

## 2023-07-21 PROCEDURE — 85610 PROTHROMBIN TIME: CPT | Performed by: HOSPITALIST

## 2023-07-21 PROCEDURE — 94640 AIRWAY INHALATION TREATMENT: CPT

## 2023-07-21 PROCEDURE — 97535 SELF CARE MNGMENT TRAINING: CPT

## 2023-07-21 PROCEDURE — 82330 ASSAY OF CALCIUM: CPT

## 2023-07-21 PROCEDURE — 25000003 PHARM REV CODE 250

## 2023-07-21 PROCEDURE — 99900035 HC TECH TIME PER 15 MIN (STAT)

## 2023-07-21 PROCEDURE — 25000242 PHARM REV CODE 250 ALT 637 W/ HCPCS: Performed by: HOSPITALIST

## 2023-07-21 PROCEDURE — 27201109 HC SYSTEM FECAL MANAGEMENT

## 2023-07-21 PROCEDURE — 25000003 PHARM REV CODE 250: Performed by: HOSPITALIST

## 2023-07-21 PROCEDURE — 99232 PR SUBSEQUENT HOSPITAL CARE,LEVL II: ICD-10-PCS | Mod: ,,, | Performed by: INTERNAL MEDICINE

## 2023-07-21 PROCEDURE — C9113 INJ PANTOPRAZOLE SODIUM, VIA: HCPCS | Performed by: STUDENT IN AN ORGANIZED HEALTH CARE EDUCATION/TRAINING PROGRAM

## 2023-07-21 PROCEDURE — 94761 N-INVAS EAR/PLS OXIMETRY MLT: CPT

## 2023-07-21 PROCEDURE — 86580 TB INTRADERMAL TEST: CPT | Performed by: INTERNAL MEDICINE

## 2023-07-21 PROCEDURE — 63600175 PHARM REV CODE 636 W HCPCS: Performed by: HOSPITALIST

## 2023-07-21 PROCEDURE — 97110 THERAPEUTIC EXERCISES: CPT

## 2023-07-21 PROCEDURE — 36415 COLL VENOUS BLD VENIPUNCTURE: CPT

## 2023-07-21 PROCEDURE — 30200315 PPD INTRADERMAL TEST REV CODE 302: Performed by: INTERNAL MEDICINE

## 2023-07-21 PROCEDURE — 27000221 HC OXYGEN, UP TO 24 HOURS

## 2023-07-21 PROCEDURE — 85025 COMPLETE CBC W/AUTO DIFF WBC: CPT

## 2023-07-21 RX ORDER — OXYCODONE HYDROCHLORIDE 5 MG/1
5 TABLET ORAL EVERY 6 HOURS PRN
Status: DISCONTINUED | OUTPATIENT
Start: 2023-07-21 | End: 2023-08-04 | Stop reason: HOSPADM

## 2023-07-21 RX ADMIN — PANTOPRAZOLE SODIUM 40 MG: 40 INJECTION, POWDER, FOR SOLUTION INTRAVENOUS at 09:07

## 2023-07-21 RX ADMIN — INSULIN ASPART 2 UNITS: 100 INJECTION, SOLUTION INTRAVENOUS; SUBCUTANEOUS at 04:07

## 2023-07-21 RX ADMIN — LACTULOSE 10 G: 20 SOLUTION ORAL at 08:07

## 2023-07-21 RX ADMIN — ACETYLCYSTEINE 2 ML: 200 SOLUTION ORAL; RESPIRATORY (INHALATION) at 08:07

## 2023-07-21 RX ADMIN — IPRATROPIUM BROMIDE AND ALBUTEROL SULFATE 3 ML: 2.5; .5 SOLUTION RESPIRATORY (INHALATION) at 08:07

## 2023-07-21 RX ADMIN — CEFTRIAXONE 1 G: 1 INJECTION, POWDER, FOR SOLUTION INTRAMUSCULAR; INTRAVENOUS at 09:07

## 2023-07-21 RX ADMIN — LEVETIRACETAM 1000 MG: 500 SOLUTION ORAL at 08:07

## 2023-07-21 RX ADMIN — LEVETIRACETAM 1000 MG: 500 SOLUTION ORAL at 09:07

## 2023-07-21 RX ADMIN — TUBERCULIN PURIFIED PROTEIN DERIVATIVE 5 UNITS: 5 INJECTION, SOLUTION INTRADERMAL at 02:07

## 2023-07-21 RX ADMIN — LACTULOSE 10 G: 20 SOLUTION ORAL at 02:07

## 2023-07-21 RX ADMIN — CLARITHROMYCIN 500 MG: 500 TABLET, FILM COATED ORAL at 09:07

## 2023-07-21 RX ADMIN — AMOXICILLIN 1000 MG: 500 CAPSULE ORAL at 08:07

## 2023-07-21 RX ADMIN — IPRATROPIUM BROMIDE AND ALBUTEROL SULFATE 3 ML: 2.5; .5 SOLUTION RESPIRATORY (INHALATION) at 09:07

## 2023-07-21 RX ADMIN — LACTULOSE 10 G: 20 SOLUTION ORAL at 09:07

## 2023-07-21 RX ADMIN — IPRATROPIUM BROMIDE AND ALBUTEROL SULFATE 3 ML: 2.5; .5 SOLUTION RESPIRATORY (INHALATION) at 01:07

## 2023-07-21 RX ADMIN — INSULIN ASPART 2 UNITS: 100 INJECTION, SOLUTION INTRAVENOUS; SUBCUTANEOUS at 08:07

## 2023-07-21 RX ADMIN — OLANZAPINE 5 MG: 5 TABLET, FILM COATED ORAL at 09:07

## 2023-07-21 RX ADMIN — CLARITHROMYCIN 500 MG: 500 TABLET, FILM COATED ORAL at 08:07

## 2023-07-21 RX ADMIN — AMOXICILLIN 1000 MG: 500 CAPSULE ORAL at 09:07

## 2023-07-21 RX ADMIN — ACETYLCYSTEINE 2 ML: 200 SOLUTION ORAL; RESPIRATORY (INHALATION) at 09:07

## 2023-07-21 RX ADMIN — INSULIN ASPART 2 UNITS: 100 INJECTION, SOLUTION INTRAVENOUS; SUBCUTANEOUS at 12:07

## 2023-07-21 RX ADMIN — ACETYLCYSTEINE 2 ML: 200 SOLUTION ORAL; RESPIRATORY (INHALATION) at 01:07

## 2023-07-21 NOTE — PLAN OF CARE
[facility] - Telemetry Stepdown  Discharge Reassessment    Primary Care Provider: Viktor Ross MD    Expected Discharge Date: 7/24/2023    Reassessment (most recent)       Discharge Reassessment - 07/21/23 1256          Discharge Reassessment    Assessment Type Discharge Planning Reassessment     Did the patient's condition or plan change since previous assessment? No     Discharge Plan discussed with: Sibling     Name(s) and Number(s) sister Enciso, [phone]     Communicated BRAYAN with patient/caregiver Yes     Discharge Plan A Skilled Nursing Facility     Discharge Plan B Rehab     DME Needed Upon Discharge  none     Transition of Care Barriers Nursing Home rejection     Why the patient remains in the hospital Requires continued medical care        Post-Acute Status    Post-Acute Authorization Placement     Post-Acute Placement Status Pending post-acute provider review/more information requested     Discharge Delays None known at this time                 Spoke with patient's sister, Zaria, regarding dc planning. Per Zaria, she spoke with Dr. Coates yesterday and would be agreeable for referrals to be sent to [facility]. Offered and emailed a SNF list to Zaria at [email or web address] for review.    [facility]- denied  [facility]- reviewing    3:54 PM  Form [facility]ASRR [facility].    Mirela Cintron LCSW  [facility]  Ext. [phone]

## 2023-07-21 NOTE — PT/OT/SLP PROGRESS
"Occupational Therapy   Treatment  Co-treatment performed due to patient's multiple deficits requiring two skilled therapists to appropriately and safely assess patient's strength and endurance while facilitating functional tasks in addition to accommodating for patient's activity tolerance.     Name: Marely Hamilton  MRN: [id number]  Admitting Diagnosis:  Gastrointestinal hemorrhage associated with duodenal ulcer  10 Days Post-Op    Recommendations:     Discharge Recommendations: nursing facility, skilled  Discharge Equipment Recommendations:  to be determined by next level of care  Barriers to discharge:  Inaccessible home environment, Decreased caregiver support    Assessment:     Marely Hamilton is a 58 y.o. female with a medical diagnosis of Gastrointestinal hemorrhage associated with duodenal ulcer.  She presents with the following performance deficits affecting function: weakness, impaired endurance, impaired self care skills, impaired functional mobility, gait instability, impaired balance, impaired cognition, decreased coordination, decreased upper extremity function, decreased lower extremity function, decreased safety awareness, decreased ROM, impaired fine motor, impaired cardiopulmonary response to activity.     Rehab Prognosis:  Good; patient would benefit from acute skilled OT services to address these deficits and reach maximum level of function.       Plan:     Patient to be seen 3 x/week to address the above listed problems via self-care/home management, therapeutic activities, therapeutic exercises  Plan of Care Expires: 08/11/23  Plan of Care Reviewed with: patient    Subjective     Chief Complaint: Pt waved and said "hi" to corner of room.   Patient/Family Comments/goals: Maximize functional independence.   Pain/Comfort:  Pain Rating 1: 0/10  Pain Rating Post-Intervention 1: 0/10    Objective:     Communicated with: ANKITA prior to session.  Patient found HOB elevated with telemetry, bowel management " system, oxygen, NG tube, pulse ox (continuous) upon OT entry to room.    General Precautions: Standard, aspiration, fall    Orthopedic Precautions:N/A  Braces: N/A  Respiratory Status: Nasal cannula     Occupational Performance:     Bed Mobility:    Patient completed Rolling/Turning to Left with  maximal assistance  Patient completed Rolling/Turning to Right with maximal assistance  Patient completed Scooting/Bridging with total assistance and 2 persons  Patient completed Supine to Sit with total assistance and 2 persons  Patient completed Sit to Supine with total assistance and 2 persons     Functional Mobility/Transfers:  Pt sat EOB w/ LUE supported x ~20'. While seated EOB pt participated in reaching ax to touch name tags. Pt easily distracted by TV and compute screen throughout session. Required min-max a to sustain attention to task.     Activities of Daily Living:  Feeding:  maximal assistance [place] assist to bring spoon to mouth  Grooming: total assistance to wash face/hair, and comb hair      [facility] 6 Click ADL: 8    Treatment & Education:  Role of OT, goals, POC    Patient left HOB elevated with all lines intact, call button in reach, bed alarm on, and NSG notified    GOALS:   Multidisciplinary Problems       Occupational Therapy Goals          Problem: Occupational Therapy    Goal Priority Disciplines Outcome Interventions   Occupational Therapy Goal     OT, PT/OT Ongoing, Progressing    Description: Goals to be met by: 7/28/2023     Patient will increase functional independence with ADLs by performing:    UE Dressing with Minimal Assistance.  LE Dressing with Moderate Assistance.  Grooming while EOB with Minimal Assistance.  Toileting from bedside commode with Maximum Assistance for hygiene and clothing management.   Supine to sit with Moderate Assistance.  Stand pivot transfers with Maximum Assistance with or without AD.  Toilet transfer to bedside commode with Maximum Assistance with or without AD.                          Time Tracking:     OT Date of Treatment: 07/21/23  OT Start Time: 0938  OT Stop Time: 1033  OT Total Time (min): 55 min    Billable Minutes:Self Care/Home Management 28  Therapeutic Activity 27    OT/DC: OT          7/21/2023

## 2023-07-21 NOTE — PT/OT/SLP PROGRESS
Physical Therapy Co-Treatment with OT      Patient Name:  Marely Hamilton   MRN:  [id number]    Recommendations:     Discharge Recommendations: nursing facility, skilled  Discharge Equipment Recommendations: wheelchair, bedside commode, bath bench, grab bar (walk in shower with grab bars)  Barriers to discharge: None    Assessment:     Marely Hamilton is a 58 y.o. female admitted with a medical diagnosis of Gastrointestinal hemorrhage associated with duodenal ulcer.  She presents with the following impairments/functional limitations: weakness, impaired balance, decreased safety awareness, impaired endurance, impaired functional mobility, decreased lower extremity function, decreased coordination, impaired cognition. Patient demonstrated fluctuating mentation at present visit, however progressed with activity and was able to improve her sitting balance and sitting tolerance. Pt is progressing towards goals, but has not yet reached prior level of function. Marely Hamilton would benefit from continued acute PT intervention to improve quality of life, focus on recovery of impairments, provide patient/caregiver education, reduce fall risk, prevent deconditioning, and maximize independence and safety with functional mobility.  Currently recommending discharge to Skilled Nursing Facility  at this time to address deficits and progress towards prior level of function. Patient has made progress with mobility towards goals and demonstrates good prognosis for continued improvement with additional skilled therapy post-discharge.     Co-treatment with OT performed due to patient complexity and deficits, requiring two skilled therapists to appropriately and safely assess patient's strength and endurance while facilitating functional tasks in addition to accommodating for patient's activity tolerance.     Rehab Prognosis: Good; patient would benefit from acute skilled PT services to address these deficits and reach maximum level of  "function.    Recent Surgery: Procedure(s) (LRB):  EGD (ESOPHAGOGASTRODUODENOSCOPY) (N/A) 10 Days Post-Op    Plan:     During this hospitalization, patient to be seen 3 x/week to address the identified rehab impairments via therapeutic activities, therapeutic exercises, neuromuscular re-education and progress toward the following goals:    Plan of Care Expires:  08/10/23    Subjective     Chief Complaint: swollen LE  Patient/Family Comments/goals: "I'm tired" following EOB activity. "Hello!"- patient waves to corner of room, when questioned who patient was waving to, replied "to be determined".     Pain/Comfort:  Pain Rating 1: 0/10  Pain Rating Post-Intervention 1: 0/10      Objective:     Communicated with RN prior to session.  Patient found HOB elevated with telemetry, bowel management system, NG tube, oxygen, PureWick upon PT entry to room.     General Precautions: Standard, aspiration, fall  Orthopedic Precautions: N/A  Braces: N/A  Respiratory Status: Nasal cannula, flow 2 L/min     Cognition:   Attention: easily distractible, difficult to attend to task, requires multiple cues to focus attention   Command Following: follows 10% of 1 step commands. Requires verbal and tactile cues and well as repetition of directions to refocus attention to commands. When attending to tasks, command following improves.    Communication:is able to communicate needs. Responds inappropriately to conversation 50% of the time, introducing additional topics.     Skin Integrity  - bilateral lower extremity swelling with pitting edema    ROM  - bilateral ankle ROM: worsened due to swelling and positioning in bed. Ankle DF ~5 deg.     Bed Mobility:  Supine > Sit: Total Assistance of 2   Sit > Supine: Total Assistance of 2  Rolling Right: Maximum Assistance  Rolling Left: Maximum Assistance  Scooting: Total Assistance of 2  Boosting: Total Assistance of 2    Self Cares  - patient able to complete some reaching and self care activity with " decreased assist level. See OT note for details.          Balance:  Static Sitting: Good with LUE abducted with elbow flexed on pillow for support. Patient displayed no lateral lean and required no cues to maintain upright.   Able to achieve EOB sitting with supervision for 20 minutes during various activity.   Dynamic Sitting: Good (same position as above).       AM-PAC 6 CLICK MOBILITY  Turning over in bed (including adjusting bedclothes, sheets and blankets)?: 2  Sitting down on and standing up from a chair with arms (e.g., wheelchair, bedside commode, etc.): 1  Moving from lying on back to sitting on the side of the bed?: 2  Moving to and from a bed to a chair (including a wheelchair)?: 1  Need to walk in hospital room?: 1  Climbing 3-5 steps with a railing?: 1  Basic Mobility Total Score: 8       Treatment & Education:  -Balance activity sitting EOB. Patient reaching for targets while maintaining upright sitting- required no cueing to maintain upright and displays good self correction. Weight shifting + marching LE while maintaining balance.   -PROM ankle DF/PF and knee/hip flexion to decrease swelling and gain greater available ROM in preparation for sitting.   -AROM ankle DF/PF, knee extension, hip flexion. Patient required assistance only for hip flexion in sitting. External cues provided with verbal and tactile feedback throughout.     Patient left HOB elevated with call button in reach, bed alarm on, and RN notified. NG tube was removed during treatment, RN notified.     GOALS:   Multidisciplinary Problems       Physical Therapy Goals          Problem: Physical Therapy    Goal Priority Disciplines Outcome Goal Variances Interventions   Physical Therapy Goal     PT, PT/OT Ongoing, Progressing     Description: Goals to be met by: 8/10/23     Patient will increase functional independence with mobility by performin. Supine to sit with Moderate Assistance  2. Rolling to Left  with Minimal Assistance.  3.  Sit to stand transfer with Moderate Assistance  4. Bed to chair transfer with Maximum Assistance   5. Sitting at edge of bed x10 minutes with Contact Guard Assistance to perform functional activities.   6. Lower extremity exercise program x10 reps per handout, with assistance as needed to improve strength for functional activities.                          Time Tracking:     PT Received On: 07/21/23  PT Start Time: 0940     PT Stop Time: 1033  PT Total Time (min): 53 min     Billable Minutes: Therapeutic Exercise 25 and Neuromuscular Re-education 28    Treatment Type: Treatment  PT/PTA: PT     Number of PTA visits since last PT visit: 0     07/21/2023

## 2023-07-21 NOTE — SUBJECTIVE & OBJECTIVE
Interval History: Looking bettrer, MS is improving, SNF next week. May take out NGT soon.    Review of Systems  Objective:     Vital Signs (Most Recent):  Temp: 98.4 °F (36.9 °C) (07/21/23 0757)  Pulse: 94 (07/21/23 0912)  Resp: 18 (07/21/23 0912)  BP: 123/69 (07/21/23 0757)  SpO2: (!) 93 % (07/21/23 0912) Vital Signs (24h Range):  Temp:  [97.3 °F (36.3 °C)-98.4 °F (36.9 °C)] 98.4 °F (36.9 °C)  Pulse:  [84-97] 94  Resp:  [16-19] 18  SpO2:  [93 %-99 %] 93 %  BP: (116-125)/(54-69) 123/69     Weight: 61.7 kg (136 lb 0.4 oz)  Body mass index is 24.88 kg/m².    Intake/Output Summary (Last 24 hours) at 7/21/2023 0931  Last data filed at 7/21/2023 0600  Gross per 24 hour   Intake 350 ml   Output 1300 ml   Net -950 ml         Physical Exam  Constitutional:       Appearance: She is ill-appearing and toxic-appearing.   HENT:      Head: Normocephalic.   Eyes:      General: Scleral icterus present.   Neck:      Thyroid: No thyroid mass or thyroid tenderness.      Vascular: No carotid bruit or hepatojugular reflux.   Cardiovascular:      Rate and Rhythm: Normal rate.      Pulses: Decreased pulses.      Heart sounds: Heart sounds are distant.   Chest:      Chest wall: No mass, lacerations or deformity.   Breasts:     Right: No swelling.      Left: No swelling.   Abdominal:      General: Abdomen is flat.      Palpations: Abdomen is rigid.      Hernia: No hernia is present.   Genitourinary:     Vagina: Normal.   Musculoskeletal:      Cervical back: Rigidity present.   Lymphadenopathy:      Cervical: No cervical adenopathy.   Skin:     Coloration: Skin is jaundiced.      Findings: Ecchymosis present.   Neurological:      Mental Status: She is unresponsive.           Significant Labs: All pertinent labs within the past 24 hours have been reviewed.  BMP:   Recent Labs   Lab 07/20/23  0411 07/21/23  0506   *  --      --    K 3.8  --    *  --    CO2 21*  --    BUN 9  --    CREATININE 0.4*  --    CALCIUM 7.9*  --    MG  1.7 1.8       Significant Imaging: I have reviewed all pertinent imaging results/findings within the past 24 hours.

## 2023-07-21 NOTE — PROGRESS NOTES
[facility] - Telemetry [facility] Medicine  Progress Note    Patient Name: Marely Hamilton  MRN: [id number]  Patient Class: IP- Inpatient   Admission Date: 7/5/2023  Length of Stay: 16 days  Attending Physician: Jermain Coates MD  Primary Care Provider: Viktor Ross MD        Subjective:     Principal Problem:Gastrointestinal hemorrhage associated with duodenal ulcer  Acute Condition: MS changes        HPI:  Marely Hamilton is a 58 y.o. female with history of alcoholic cirrhosis, seizure disorder, DVT and IJ thrombus with recent admission for large retroperitoneal hemorrhage requiring IR embolization and IVC filter placement who presents for hematochezia. Onset this morning at [facility], alida blood per rectum per chart, sent to ED. Initially normotensive but then severe hypotension prompted initiation of levophed and intubation. Hgb 6.8, 2u pRBC given + 1u FFP ordered. Hgb 8.5 on 7/2. On levo and vaso currently. K 6.6 but renal function at baseline. Repeat pending. No prior EGDs on record.          Overview/Hospital Course:    Patient was seen at bedside, hematochezia still present post op by IR. Patient has required many transfusions of pRBCs and platelets due to ongoing down trending of Hgb. No clear source of ongoing hemorrhage by imaging. Patient has continued to require pressors to maintain MAP >65. Discussed with GI and IR regarding active bleeding post op, IR indicated there is not much else to do from their end bc they embolized a significant part of GDA. Pt is off of pressor requirement and not actively bleeding. GI re evaluated pt via EGD and found no sources of active bleeding. Pt is extubated and currently on BIPAP requirement due to de saturation in the 80's. Pt is to be seen by speech and PT/OT. Clear mucinous secretions via suction, chest physiotherapy, and cough assist device. Nebs to help with breathing as well. Pt is off BIPAP and currently on comfort flow. Triple therapy (amoxicillin,  clarithromycin) started for 14 days to treat for positive H pylori serology. Pt also had a NG tube placed so we may begin tube feedings. CxR, EKG, and ABG displayed no reason for tachycardia. Pt becomes anxious when seen by different provider teams. Remove art line and consult for midline placement. Continue to wean comfort flow as tolerated. Ensure pt is working with PT/OT/Speech for continuous improvement. Consult psych regarding patient history of bipolar disorder.         Interval History/Significant Events: Wean comfort flow as tolerated. Ensure patient is working with PT/OT/Speech for continuous improvement. Consult psych regarding psych history     7/16   history of alcoholic cirrhosis, seizure disorder, DVT and IJ thrombus with recent admission for large retroperitoneal hemorrhage requiring IR embolization and IVC filter placement who presents for hematochezia. s/p GI and IR in which they clipped (GI) and embolized (IR) possible sources of duodenal ulcer bleeding.  Hb stable  s/p extubation. sats 92% on 5L of NC.  CX ray - Detrimental changes since the previous examination including interval increase in pulmonary vascularity and bilateral diffuse interstitial pulmonary parenchymal opacification, progression of abnormal opacification at the left lung base, and development of small right-sided pleural effusion.   findings would be consistent with congestive heart failure.PT/OT recs SNF. BNP , procalcitonin and Echo.  SLP recs NPO .  On NGT feeds. psychiatry following for bipolar disorders.  Recs Zyprexa 5 mg QHS . Ammonia 48 WNL. AAOX 1. Hepatology consulted for cirrhosis. UA ordered   7/17 Saldaña removed. UA +. UC pending. started on IV ceftriaxone. ammonia at 59. on B/L UE mittens as pulled of NGT. Hepatology eval. resumed lactulose . discussed with sister and updated clinical status   7/18  MBS today - started puree nectar thick liquids.  on oxygen 3L via NC.   wean  as tolerated . UC -YEAST 10,000 - 49,999  cfu/ml Identification pending No other significant isolate. hydroxyzine  PRN discontinued  due to it not being safe in delirium. oriented to person, hospital and year   7/19 SLP recs - tolerating Puree Diet - IDDSI Level 4, Mildly thick/Nectar thick liquids - IDDSI Level 2 .stool output 600ml/ monitor on lactulose . improved mentation AAOX 3      Interval History: Looking bettrer, MS is improving, SNF next week. May take out NGT soon.    Review of Systems  Objective:     Vital Signs (Most Recent):  Temp: 98.4 °F (36.9 °C) (07/21/23 0757)  Pulse: 94 (07/21/23 0912)  Resp: 18 (07/21/23 0912)  BP: 123/69 (07/21/23 0757)  SpO2: (!) 93 % (07/21/23 0912) Vital Signs (24h Range):  Temp:  [97.3 °F (36.3 °C)-98.4 °F (36.9 °C)] 98.4 °F (36.9 °C)  Pulse:  [84-97] 94  Resp:  [16-19] 18  SpO2:  [93 %-99 %] 93 %  BP: (116-125)/(54-69) 123/69     Weight: 61.7 kg (136 lb 0.4 oz)  Body mass index is 24.88 kg/m².    Intake/Output Summary (Last 24 hours) at 7/21/2023 0931  Last data filed at 7/21/2023 0600  Gross per 24 hour   Intake 350 ml   Output 1300 ml   Net -950 ml         Physical Exam  Constitutional:       Appearance: She is ill-appearing and toxic-appearing.   HENT:      Head: Normocephalic.   Eyes:      General: Scleral icterus present.   Neck:      Thyroid: No thyroid mass or thyroid tenderness.      Vascular: No carotid bruit or hepatojugular reflux.   Cardiovascular:      Rate and Rhythm: Normal rate.      Pulses: Decreased pulses.      Heart sounds: Heart sounds are distant.   Chest:      Chest wall: No mass, lacerations or deformity.   Breasts:     Right: No swelling.      Left: No swelling.   Abdominal:      General: Abdomen is flat.      Palpations: Abdomen is rigid.      Hernia: No hernia is present.   Genitourinary:     Vagina: Normal.   Musculoskeletal:      Cervical back: Rigidity present.   Lymphadenopathy:      Cervical: No cervical adenopathy.   Skin:     Coloration: Skin is jaundiced.      Findings:  Ecchymosis present.   Neurological:      Mental Status: She is unresponsive.           Significant Labs: All pertinent labs within the past 24 hours have been reviewed.  BMP:   Recent Labs   Lab 07/20/23  0411 07/21/23  0506   *  --      --    K 3.8  --    *  --    CO2 21*  --    BUN 9  --    CREATININE 0.4*  --    CALCIUM 7.9*  --    MG 1.7 1.8       Significant Imaging: I have reviewed all pertinent imaging results/findings within the past 24 hours.      Assessment/Plan:      * Gastrointestinal hemorrhage associated with duodenal ulcer    as  above        Dysphagia  7/18  MBS today -started puree nectar thick liquids.        Hypoxia   7/16 sats 92% on 5L of NC.  CX ray - Detrimental changes since the previous examination including interval increase in pulmonary vascularity and bilateral diffuse interstitial pulmonary parenchymal opacification, progression of abnormal opacification at the left lung base, and development of small right-sided pleural effusion.   findings would be consistent with congestive heart failure.PT/OT recs SNF. BNP , procalcitonin and Echo.     H. pylori infection    positive serology for H pylori, begin triple therapy with clarithromycin,amoxicillin, and PPI for 14 days       Hematochezia    - GI EGD clipped duodenal ulcers for IR reference  - Transfuse blood products for active bleeding   - trend CBC and follow  - IR embolized GDA for duodenal hyperemia   -- GI re evaluated site of duodenal ulcers, no noted active bleeding  -- Continue PPI BID for 8 weeks, then once daily after that  --Pt had positive serology for H pylori, begin triple therapy with clarithromycin,amoxicillin, and PPI for 14 days      Acute blood loss anemia     - Maintain IV access with 2 large bore Ivs  - Intravascular resuscitation/support with IVFs   - Hold all NSAIDs and anticoagulants, unless contraindicated.  - Please correct any coagulopathy with platelets and FFP for goal of platelets >50K and  INR <2.0  - Please notify GI team if there is significant change in patient's clinical status  - To date, patient has required at least 21 units of pRBCs to maintain Hgb >7     7/16     Patient's with macrocytic anemia.. Hemoglobin stable. Etiology likely due to acute blood loss.  Current CBC reviewed-    Recent Labs   Lab 07/14/23  0752 07/15/23  0350 07/16/23  0825   HGB 8.6* 8.1* 8.0*         Component Value Date/Time     (H) 07/16/2023 0825    RDW 24.7 (H) 07/16/2023 0825    IRON 132 05/18/2023 1527    FERRITIN 110 05/18/2023 1527    RETIC 3.9 (H) 06/05/2023 0935    FOLATE 16.2 05/31/2023 1250    XFFNYWKP13 >2000 (H) 05/31/2023 1250    OCCULTBLOOD Negative 09/19/2015 0137     Monitor CBC and transfuse if H/H drops below 7/21.        Retroperitoneal bleed  Retroperitoneum: No significant adenopathy.  Similar sized left retroperitoneal hematoma measuring 11 x 9 cm (series 5, image 100; previously 11 x 9 cm).  The left iliacus collection also measures similar to prior at 5.4 cm (series 5, image 127; previously 5.6 cm).  Layering hyperdensity within these collections suggesting different ages in blood products.  No significant volume active extravasation into these collections  - CT-A demonstrated stable retroperitoneal hematoma. No need for intervention at this time.          Supratherapeutic INR  patient with INR   Recent Labs   Lab 07/17/23  0619 07/18/23  0525 07/19/23  0350   INR 1.9* 1.7* 1.6*   . INR is supratherapeutic. secondary to coagulopathy from liver disease.monitor      UTI (urinary tract infection)  7/17 Saldaña removed. UA +. UC pending. started on IV ceftriaxone.  7/18 UC -YEAST 10,000 - 49,999 cfu/ml Identification pending No other significant isolate    Bipolar 1 disorder     - Consult psych regarding Bipolar 1 disorder history       Hepatic encephalopathy   ammonia 190s on admit -->90s . AAOX 1. no lactulose give due to GI bleed  repeat ammonia. Hepatolog eval  with     MELD 3.0: 21 at  7/17/2023  6:19 AM  MELD-Na: 18 at 7/17/2023  6:19 AM  Calculated from:  Serum Creatinine: 0.4 mg/dL (Using min of 1 mg/dL) at 7/17/2023  6:19 AM  Serum Sodium: 143 mmol/L (Using max of 137 mmol/L) at 7/17/2023  6:19 AM  Total Bilirubin: 3.0 mg/dL at 7/17/2023  6:19 AM  Serum Albumin: 2.2 g/dL at 7/17/2023  6:19 AM  INR(ratio): 1.9 at 7/17/2023  6:19 AM  Age at listing (hypothetical): 58 years  Sex: Female at 7/17/2023  6:19 AM  7/16 Ammonia 48 WNL. AAOX 1. Hepatology consulted for cirrhosis.    7/17  Hepatology eval. resumed lactulose.  7/18  hydroxyzine  PRN  discontinued  due to it not being safe in delirium    Severe protein-calorie malnutrition  Nutrition consulted. Most recent weight and BMI monitored-     Measurements:  Wt Readings from Last 1 Encounters:   07/16/23 62.7 kg (138 lb 4.4 oz)   Body mass index is 25.29 kg/m².    Patient has been screened and assessed by RD.    Malnutrition Type:  Context: social/environmental circumstances  Level: severe    Malnutrition Characteristic Summary:  Energy Intake (Malnutrition): less than or equal to 75% for greater than or equal to 1 month  Subcutaneous Fat (Malnutrition): severe depletion  Muscle Mass (Malnutrition): severe depletion      History of seizure  on Keppra via NGT       Thrombocytopenia  with cirrhosis   Patient with thrombocytopenia   Recent Labs   Lab 07/17/23  0619 07/18/23  0525 07/19/23  0350   PLT 34* 32* 34*   . Platelet counts stable  .monitor      Cirrhosis, Laennec's  alcoholic cirrhosis  MELD 3.0: 20 at 7/16/2023  4:51 AM  MELD-Na: 17 at 7/16/2023  4:51 AM  Calculated from:  Serum Creatinine: 0.4 mg/dL (Using min of 1 mg/dL) at 7/16/2023  4:51 AM  Serum Sodium: 145 mmol/L (Using max of 137 mmol/L) at 7/16/2023  4:51 AM  Total Bilirubin: 3.0 mg/dL at 7/16/2023  4:51 AM  Serum Albumin: 2.3 g/dL at 7/16/2023  4:51 AM  INR(ratio): 1.8 at 7/15/2023  3:50 AM  Age at listing (hypothetical): 58 years  Sex: Female at 7/16/2023  4:51 AM     Recent Labs    Lab 07/16/23  1201   AMMONIA 48     Strict input /output monitor, daily weights        VTE Risk Mitigation (From admission, onward)         Ordered     Place sequential compression device  Until discontinued         07/14/23 1024     Reason for No Pharmacological VTE Prophylaxis  Once        Question:  Reasons:  Answer:  Active Bleeding    07/05/23 1334     IP VTE HIGH RISK PATIENT  Once         07/05/23 1334                Discharge Planning   BRAYAN: 7/24/2023     Code Status: DNR   Is the patient medically ready for discharge?: No    Reason for patient still in hospital (select all that apply): Patient unstable  Discharge Plan A: Skilled Nursing Facility   Discharge Delays: None known at this time              Jermain Coates MD  Department of Hospital Medicine   [facility] - Telemetry Stepdown

## 2023-07-21 NOTE — PLAN OF CARE
Problem: Infection  Goal: Absence of Infection Signs and Symptoms  Outcome: Ongoing, Progressing     Problem: Adult Inpatient Plan of Care  Goal: Plan of Care Review  Outcome: Ongoing, Progressing     Problem: Fluid and Electrolyte Imbalance (Acute Kidney Injury/Impairment)  Goal: Fluid and Electrolyte Balance  Outcome: Ongoing, Progressing     Problem: Impaired Wound Healing  Goal: Optimal Wound Healing  Outcome: Ongoing, Progressing     Problem: Fall Injury Risk  Goal: Absence of Fall and Fall-Related Injury  Outcome: Ongoing, Progressing     Problem: Skin Injury Risk Increased  Goal: Skin Health and Integrity  Outcome: Ongoing, Progressing

## 2023-07-21 NOTE — PLAN OF CARE
Problem: Infection  Goal: Absence of Infection Signs and Symptoms  Outcome: Ongoing, Progressing     Problem: Adult Inpatient Plan of Care  Goal: Plan of Care Review  Outcome: Ongoing, Progressing  Goal: Patient-Specific Goal (Individualized)  Outcome: Ongoing, Progressing  Goal: Absence of Hospital-Acquired Illness or Injury  Outcome: Ongoing, Progressing  Goal: Optimal Comfort and Wellbeing  Outcome: Ongoing, Progressing  Goal: Readiness for Transition of Care  Outcome: Ongoing, Progressing     Problem: Fluid and Electrolyte Imbalance (Acute Kidney Injury/Impairment)  Goal: Fluid and Electrolyte Balance  Outcome: Ongoing, Progressing     Problem: Oral Intake Inadequate (Acute Kidney Injury/Impairment)  Goal: Optimal Nutrition Intake  Outcome: Ongoing, Progressing     Problem: Renal Function Impairment (Acute Kidney Injury/Impairment)  Goal: Effective Renal Function  Outcome: Ongoing, Progressing     Problem: Impaired Wound Healing  Goal: Optimal Wound Healing  Outcome: Ongoing, Progressing     Problem: Fall Injury Risk  Goal: Absence of Fall and Fall-Related Injury  Outcome: Ongoing, Progressing     Problem: Restraint, Nonbehavioral (Nonviolent)  Goal: Absence of Harm or Injury  Outcome: Ongoing, Progressing     Problem: Nutrition Impairment (Mechanical Ventilation, Invasive)  Goal: Optimal Nutrition Delivery  Outcome: Ongoing, Progressing     Problem: Skin and Tissue Injury (Mechanical Ventilation, Invasive)  Goal: Absence of Device-Related Skin and Tissue Injury  Outcome: Ongoing, Progressing     Problem: Ventilator-Induced Lung Injury (Mechanical Ventilation, Invasive)  Goal: Absence of Ventilator-Induced Lung Injury  Outcome: Ongoing, Progressing     Problem: Communication Impairment (Artificial Airway)  Goal: Effective Communication  Outcome: Ongoing, Progressing     Problem: Device-Related Complication Risk (Artificial Airway)  Goal: Optimal Device Function  Outcome: Ongoing, Progressing     Problem: Skin  and Tissue Injury (Artificial Airway)  Goal: Absence of Device-Related Skin or Tissue Injury  Outcome: Ongoing, Progressing    VSS and WNL. No issues overnight. Glucose WNL. Tolerating diet advancement well.      Problem: Skin Injury Risk Increased  Goal: Skin Health and Integrity  Outcome: Ongoing, Progressing

## 2023-07-22 LAB
CA-I BLDV-SCNC: 1.22 MMOL/L (ref 1.06–1.42)
POCT GLUCOSE: 107 MG/DL (ref 70–110)
POCT GLUCOSE: 93 MG/DL (ref 70–110)
POCT GLUCOSE: 98 MG/DL (ref 70–110)

## 2023-07-22 PROCEDURE — 25000003 PHARM REV CODE 250: Performed by: INTERNAL MEDICINE

## 2023-07-22 PROCEDURE — C9113 INJ PANTOPRAZOLE SODIUM, VIA: HCPCS | Performed by: STUDENT IN AN ORGANIZED HEALTH CARE EDUCATION/TRAINING PROGRAM

## 2023-07-22 PROCEDURE — 27201109 HC SYSTEM FECAL MANAGEMENT

## 2023-07-22 PROCEDURE — 99232 PR SUBSEQUENT HOSPITAL CARE,LEVL II: ICD-10-PCS | Mod: ,,, | Performed by: INTERNAL MEDICINE

## 2023-07-22 PROCEDURE — 25000242 PHARM REV CODE 250 ALT 637 W/ HCPCS: Performed by: HOSPITALIST

## 2023-07-22 PROCEDURE — 99232 SBSQ HOSP IP/OBS MODERATE 35: CPT | Mod: ,,, | Performed by: INTERNAL MEDICINE

## 2023-07-22 PROCEDURE — 94761 N-INVAS EAR/PLS OXIMETRY MLT: CPT

## 2023-07-22 PROCEDURE — 63600175 PHARM REV CODE 636 W HCPCS: Performed by: STUDENT IN AN ORGANIZED HEALTH CARE EDUCATION/TRAINING PROGRAM

## 2023-07-22 PROCEDURE — 20600001 HC STEP DOWN PRIVATE ROOM

## 2023-07-22 PROCEDURE — 25000003 PHARM REV CODE 250: Performed by: HOSPITALIST

## 2023-07-22 PROCEDURE — 94640 AIRWAY INHALATION TREATMENT: CPT

## 2023-07-22 PROCEDURE — 27000221 HC OXYGEN, UP TO 24 HOURS

## 2023-07-22 PROCEDURE — 36415 COLL VENOUS BLD VENIPUNCTURE: CPT

## 2023-07-22 PROCEDURE — 99900035 HC TECH TIME PER 15 MIN (STAT)

## 2023-07-22 PROCEDURE — 94668 MNPJ CHEST WALL SBSQ: CPT

## 2023-07-22 PROCEDURE — 25000003 PHARM REV CODE 250

## 2023-07-22 PROCEDURE — 82330 ASSAY OF CALCIUM: CPT

## 2023-07-22 RX ADMIN — ACETYLCYSTEINE 2 ML: 200 SOLUTION ORAL; RESPIRATORY (INHALATION) at 02:07

## 2023-07-22 RX ADMIN — ACETYLCYSTEINE 2 ML: 200 SOLUTION ORAL; RESPIRATORY (INHALATION) at 09:07

## 2023-07-22 RX ADMIN — LEVETIRACETAM 1000 MG: 500 SOLUTION ORAL at 08:07

## 2023-07-22 RX ADMIN — OLANZAPINE 5 MG: 5 TABLET, FILM COATED ORAL at 09:07

## 2023-07-22 RX ADMIN — IPRATROPIUM BROMIDE AND ALBUTEROL SULFATE 3 ML: 2.5; .5 SOLUTION RESPIRATORY (INHALATION) at 08:07

## 2023-07-22 RX ADMIN — AMOXICILLIN 1000 MG: 500 CAPSULE ORAL at 08:07

## 2023-07-22 RX ADMIN — CLARITHROMYCIN 500 MG: 500 TABLET, FILM COATED ORAL at 09:07

## 2023-07-22 RX ADMIN — LACTULOSE 10 G: 20 SOLUTION ORAL at 09:07

## 2023-07-22 RX ADMIN — LACTULOSE 10 G: 20 SOLUTION ORAL at 08:07

## 2023-07-22 RX ADMIN — CLARITHROMYCIN 500 MG: 500 TABLET, FILM COATED ORAL at 08:07

## 2023-07-22 RX ADMIN — IPRATROPIUM BROMIDE AND ALBUTEROL SULFATE 3 ML: 2.5; .5 SOLUTION RESPIRATORY (INHALATION) at 02:07

## 2023-07-22 RX ADMIN — OXYCODONE HYDROCHLORIDE 5 MG: 5 TABLET ORAL at 09:07

## 2023-07-22 RX ADMIN — AMOXICILLIN 1000 MG: 500 CAPSULE ORAL at 09:07

## 2023-07-22 RX ADMIN — LACTULOSE 10 G: 20 SOLUTION ORAL at 02:07

## 2023-07-22 RX ADMIN — LEVETIRACETAM 1000 MG: 500 SOLUTION ORAL at 09:07

## 2023-07-22 RX ADMIN — IPRATROPIUM BROMIDE AND ALBUTEROL SULFATE 3 ML: 2.5; .5 SOLUTION RESPIRATORY (INHALATION) at 09:07

## 2023-07-22 RX ADMIN — PANTOPRAZOLE SODIUM 40 MG: 40 INJECTION, POWDER, FOR SOLUTION INTRAVENOUS at 08:07

## 2023-07-22 RX ADMIN — PANTOPRAZOLE SODIUM 40 MG: 40 INJECTION, POWDER, FOR SOLUTION INTRAVENOUS at 09:07

## 2023-07-22 RX ADMIN — OXYCODONE HYDROCHLORIDE 5 MG: 5 TABLET ORAL at 04:07

## 2023-07-22 RX ADMIN — ACETYLCYSTEINE 2 ML: 200 SOLUTION ORAL; RESPIRATORY (INHALATION) at 08:07

## 2023-07-22 NOTE — PLAN OF CARE
Problem: Infection  Goal: Absence of Infection Signs and Symptoms  Outcome: Ongoing, Progressing     Problem: Adult Inpatient Plan of Care  Goal: Plan of Care Review  Outcome: Ongoing, Progressing  Goal: Patient-Specific Goal (Individualized)  Outcome: Ongoing, Progressing  Goal: Absence of Hospital-Acquired Illness or Injury  Outcome: Ongoing, Progressing  Goal: Optimal Comfort and Wellbeing  Outcome: Ongoing, Progressing  Goal: Readiness for Transition of Care  Outcome: Ongoing, Progressing     Problem: Fluid and Electrolyte Imbalance (Acute Kidney Injury/Impairment)  Goal: Fluid and Electrolyte Balance  Outcome: Ongoing, Progressing     Problem: Oral Intake Inadequate (Acute Kidney Injury/Impairment)  Goal: Optimal Nutrition Intake  Outcome: Ongoing, Progressing     Problem: Renal Function Impairment (Acute Kidney Injury/Impairment)  Goal: Effective Renal Function  Outcome: Ongoing, Progressing     Problem: Impaired Wound Healing  Goal: Optimal Wound Healing  Outcome: Ongoing, Progressing     Problem: Fall Injury Risk  Goal: Absence of Fall and Fall-Related Injury  Outcome: Ongoing, Progressing     Problem: Restraint, Nonbehavioral (Nonviolent)  Goal: Absence of Harm or Injury  Outcome: Ongoing, Progressing     Problem: Nutrition Impairment (Mechanical Ventilation, Invasive)  Goal: Optimal Nutrition Delivery  Outcome: Ongoing, Progressing     Problem: Skin and Tissue Injury (Mechanical Ventilation, Invasive)  Goal: Absence of Device-Related Skin and Tissue Injury  Outcome: Ongoing, Progressing     Problem: Ventilator-Induced Lung Injury (Mechanical Ventilation, Invasive)  Goal: Absence of Ventilator-Induced Lung Injury  Outcome: Ongoing, Progressing     Problem: Communication Impairment (Artificial Airway)  Goal: Effective Communication  Outcome: Ongoing, Progressing     Problem: Device-Related Complication Risk (Artificial Airway)  Goal: Optimal Device Function  Outcome: Ongoing, Progressing     Problem: Skin  and Tissue Injury (Artificial Airway)  Goal: Absence of Device-Related Skin or Tissue Injury  Outcome: Ongoing, Progressing     Problem: Skin Injury Risk Increased  Goal: Skin Health and Integrity  Outcome: Ongoing, Progressing   Pt alert. Able to communicate needs clearly. Tolerating PO intake well. Good appetite. Fed peaches and pudding. Ate 100% of that. Pt Q2 turns. Safety precautions in place. VSS and WNL.

## 2023-07-22 NOTE — PROGRESS NOTES
Jimmy Phillip - Telemetry [facility]  Progress Note    Patient Name: Marely Hamilton  MRN: [id number]  Patient Class: IP- Inpatient   Admission Date: 7/5/2023  Length of Stay: 17 days  Attending Physician: Jermain Coates MD  Primary Care Provider: Viktor Ross MD        Subjective:     Principal Problem:Gastrointestinal hemorrhage associated with duodenal ulcer  Acute Condition: MS is stable        HPI:  Marely Hamilton is a 58 y.o. female with history of alcoholic cirrhosis, seizure disorder, DVT and IJ thrombus with recent admission for large retroperitoneal hemorrhage requiring IR embolization and IVC filter placement who presents for hematochezia. Onset this morning at [facility], alida blood per rectum per chart, sent to ED. Initially normotensive but then severe hypotension prompted initiation of levophed and intubation. Hgb 6.8, 2u pRBC given + 1u FFP ordered. Hgb 8.5 on 7/2. On levo and vaso currently. K 6.6 but renal function at baseline. Repeat pending. No prior EGDs on record.          Overview/Hospital Course:    Patient was seen at bedside, hematochezia still present post op by IR. Patient has required many transfusions of pRBCs and platelets due to ongoing down trending of Hgb. No clear source of ongoing hemorrhage by imaging. Patient has continued to require pressors to maintain MAP >65. Discussed with GI and IR regarding active bleeding post op, IR indicated there is not much else to do from their end bc they embolized a significant part of GDA. Pt is off of pressor requirement and not actively bleeding. GI re evaluated pt via EGD and found no sources of active bleeding. Pt is extubated and currently on BIPAP requirement due to de saturation in the 80's. Pt is to be seen by speech and PT/OT. Clear mucinous secretions via suction, chest physiotherapy, and cough assist device. Nebs to help with breathing as well. Pt is off BIPAP and currently on comfort flow. Triple therapy (amoxicillin,  clarithromycin) started for 14 days to treat for positive H pylori serology. Pt also had a NG tube placed so we may begin tube feedings. CxR, EKG, and ABG displayed no reason for tachycardia. Pt becomes anxious when seen by different provider teams. Remove art line and consult for midline placement. Continue to wean comfort flow as tolerated. Ensure pt is working with PT/OT/Speech for continuous improvement. Consult psych regarding patient history of bipolar disorder.         Interval History/Significant Events: Wean comfort flow as tolerated. Ensure patient is working with PT/OT/Speech for continuous improvement. Consult psych regarding psych history     7/16   history of alcoholic cirrhosis, seizure disorder, DVT and IJ thrombus with recent admission for large retroperitoneal hemorrhage requiring IR embolization and IVC filter placement who presents for hematochezia. s/p GI and IR in which they clipped (GI) and embolized (IR) possible sources of duodenal ulcer bleeding.  Hb stable  s/p extubation. sats 92% on 5L of NC.  CX ray - Detrimental changes since the previous examination including interval increase in pulmonary vascularity and bilateral diffuse interstitial pulmonary parenchymal opacification, progression of abnormal opacification at the left lung base, and development of small right-sided pleural effusion.   findings would be consistent with congestive heart failure.PT/OT recs SNF. BNP , procalcitonin and Echo.  SLP recs NPO .  On NGT feeds. psychiatry following for bipolar disorders.  Recs Zyprexa 5 mg QHS . Ammonia 48 WNL. AAOX 1. Hepatology consulted for cirrhosis. UA ordered   7/17 Saldaña removed. UA +. UC pending. started on IV ceftriaxone. ammonia at 59. on B/L UE mittens as pulled of NGT. Hepatology eval. resumed lactulose . discussed with sister and updated clinical status   7/18  MBS today - started puree nectar thick liquids.  on oxygen 3L via NC.   wean  as tolerated . UC -YEAST 10,000 - 49,999  cfu/ml Identification pending No other significant isolate. hydroxyzine  PRN discontinued  due to it not being safe in delirium. oriented to person, hospital and year   7/19 SLP recs - tolerating Puree Diet - IDDSI Level 4, Mildly thick/Nectar thick liquids - IDDSI Level 2 .stool output 600ml/ monitor on lactulose . improved mentation AAOX 3      Interval History: Plan on placement early this week. MS is improving.    Review of Systems  Objective:     Vital Signs (Most Recent):  Temp: 98.5 °F (36.9 °C) (07/22/23 0800)  Pulse: 84 (07/22/23 0914)  Resp: 18 (07/22/23 0914)  BP: (!) 106/56 (07/22/23 0800)  SpO2: 99 % (07/22/23 0914) Vital Signs (24h Range):  Temp:  [97.4 °F (36.3 °C)-99 °F (37.2 °C)] 98.5 °F (36.9 °C)  Pulse:  [] 84  Resp:  [18-20] 18  SpO2:  [90 %-99 %] 99 %  BP: (106-141)/(56-63) 106/56     Weight: 61.7 kg (136 lb 0.4 oz)  Body mass index is 24.88 kg/m².    Intake/Output Summary (Last 24 hours) at 7/22/2023 1023  Last data filed at 7/22/2023 0631  Gross per 24 hour   Intake 200 ml   Output 1000 ml   Net -800 ml         Physical Exam  Constitutional:       Appearance: She is ill-appearing and toxic-appearing.   HENT:      Head: Normocephalic.   Eyes:      General: Scleral icterus present.   Neck:      Thyroid: No thyroid mass or thyroid tenderness.      Vascular: No carotid bruit or hepatojugular reflux.   Cardiovascular:      Rate and Rhythm: Normal rate.      Pulses: Decreased pulses.      Heart sounds: Heart sounds are distant.   Chest:      Chest wall: No mass, lacerations or deformity.   Breasts:     Right: No swelling.      Left: No swelling.   Abdominal:      General: Abdomen is flat.      Palpations: Abdomen is rigid.      Hernia: No hernia is present.   Genitourinary:     Vagina: Normal.   Musculoskeletal:      Cervical back: Rigidity present.   Lymphadenopathy:      Cervical: No cervical adenopathy.   Skin:     Coloration: Skin is jaundiced.      Findings: Ecchymosis present.    Neurological:      Mental Status: She is unresponsive.           Significant Labs: All pertinent labs within the past 24 hours have been reviewed.  BMP:   Recent Labs   Lab 07/21/23  0506   *      K 4.2   *   CO2 18*   BUN 9   CREATININE 0.4*   CALCIUM 8.0*   MG 1.8       Significant Imaging: I have reviewed all pertinent imaging results/findings within the past 24 hours.      Assessment/Plan:      * Gastrointestinal hemorrhage associated with duodenal ulcer    as  above        Dysphagia  7/18  MBS today -started puree nectar thick liquids.        Hypoxia   7/16 sats 92% on 5L of NC.  CX ray - Detrimental changes since the previous examination including interval increase in pulmonary vascularity and bilateral diffuse interstitial pulmonary parenchymal opacification, progression of abnormal opacification at the left lung base, and development of small right-sided pleural effusion.   findings would be consistent with congestive heart failure.PT/OT recs SNF. BNP , procalcitonin and Echo.     H. pylori infection    positive serology for H pylori, begin triple therapy with clarithromycin,amoxicillin, and PPI for 14 days       Hematochezia    - GI EGD clipped duodenal ulcers for IR reference  - Transfuse blood products for active bleeding   - trend CBC and follow  - IR embolized GDA for duodenal hyperemia   -- GI re evaluated site of duodenal ulcers, no noted active bleeding  -- Continue PPI BID for 8 weeks, then once daily after that  --Pt had positive serology for H pylori, begin triple therapy with clarithromycin,amoxicillin, and PPI for 14 days      Acute blood loss anemia     - Maintain IV access with 2 large bore Ivs  - Intravascular resuscitation/support with IVFs   - Hold all NSAIDs and anticoagulants, unless contraindicated.  - Please correct any coagulopathy with platelets and FFP for goal of platelets >50K and INR <2.0  - Please notify GI team if there is significant change in patient's  clinical status  - To date, patient has required at least 21 units of pRBCs to maintain Hgb >7     7/16     Patient's with macrocytic anemia.. Hemoglobin stable. Etiology likely due to acute blood loss.  Current CBC reviewed-    Recent Labs   Lab 07/14/23  0752 07/15/23  0350 07/16/23  0825   HGB 8.6* 8.1* 8.0*         Component Value Date/Time     (H) 07/16/2023 0825    RDW 24.7 (H) 07/16/2023 0825    IRON 132 05/18/2023 1527    FERRITIN 110 05/18/2023 1527    RETIC 3.9 (H) 06/05/2023 0935    FOLATE 16.2 05/31/2023 1250    [id number] >2000 (H) 05/31/2023 1250    OCCULTBLOOD Negative 09/19/2015 0137     Monitor CBC and transfuse if H/H drops below 7/21.        Retroperitoneal bleed  Retroperitoneum: No significant adenopathy.  Similar sized left retroperitoneal hematoma measuring 11 x 9 cm (series 5, image 100; previously 11 x 9 cm).  The left iliacus collection also measures similar to prior at 5.4 cm (series 5, image 127; previously 5.6 cm).  Layering hyperdensity within these collections suggesting different ages in blood products.  No significant volume active extravasation into these collections  - CT-A demonstrated stable retroperitoneal hematoma. No need for intervention at this time.          Supratherapeutic INR  patient with INR   Recent Labs   Lab 07/17/23  0619 07/18/23  0525 07/19/23  0350   INR 1.9* 1.7* 1.6*   . INR is supratherapeutic. secondary to coagulopathy from liver disease.monitor      UTI (urinary tract infection)  7/17 Saldaña removed. UA +. UC pending. started on IV ceftriaxone.  7/18 UC -YEAST 10,000 - 49,999 cfu/ml Identification pending No other significant isolate    Bipolar 1 disorder     - Consult psych regarding Bipolar 1 disorder history       Hepatic encephalopathy   ammonia 190s on admit -->90s . AAOX 1. no lactulose give due to GI bleed  repeat ammonia. Hepatolog eval  with     MELD 3.0: 21 at 7/17/2023  6:19 AM  MELD-Na: 18 at 7/17/2023  6:19 AM  Calculated from:  Serum  Creatinine: 0.4 mg/dL (Using min of 1 mg/dL) at 7/17/2023  6:19 AM  Serum Sodium: 143 mmol/L (Using max of 137 mmol/L) at 7/17/2023  6:19 AM  Total Bilirubin: 3.0 mg/dL at 7/17/2023  6:19 AM  Serum Albumin: 2.2 g/dL at 7/17/2023  6:19 AM  INR(ratio): 1.9 at 7/17/2023  6:19 AM  Age at listing (hypothetical): 58 years  Sex: Female at 7/17/2023  6:19 AM  7/16 Ammonia 48 WNL. AAOX 1. Hepatology consulted for cirrhosis.    7/17  Hepatology eval. resumed lactulose.  7/18  hydroxyzine  PRN  discontinued  due to it not being safe in delirium    Severe protein-calorie malnutrition  Nutrition consulted. Most recent weight and BMI monitored-     Measurements:  Wt Readings from Last 1 Encounters:   07/16/23 62.7 kg (138 lb 4.4 oz)   Body mass index is 25.29 kg/m².    Patient has been screened and assessed by RD.    Malnutrition Type:  Context: social/environmental circumstances  Level: severe    Malnutrition Characteristic Summary:  Energy Intake (Malnutrition): less than or equal to 75% for greater than or equal to 1 month  Subcutaneous Fat (Malnutrition): severe depletion  Muscle Mass (Malnutrition): severe depletion      History of seizure  on Keppra via NGT       Thrombocytopenia  with cirrhosis   Patient with thrombocytopenia   Recent Labs   Lab 07/17/23  0619 07/18/23  0525 07/19/23  0350   PLT 34* 32* 34*   . Platelet counts stable  .monitor      Cirrhosis, Laennec's  alcoholic cirrhosis  MELD 3.0: 20 at 7/16/2023  4:51 AM  MELD-Na: 17 at 7/16/2023  4:51 AM  Calculated from:  Serum Creatinine: 0.4 mg/dL (Using min of 1 mg/dL) at 7/16/2023  4:51 AM  Serum Sodium: 145 mmol/L (Using max of 137 mmol/L) at 7/16/2023  4:51 AM  Total Bilirubin: 3.0 mg/dL at 7/16/2023  4:51 AM  Serum Albumin: 2.3 g/dL at 7/16/2023  4:51 AM  INR(ratio): 1.8 at 7/15/2023  3:50 AM  Age at listing (hypothetical): 58 years  Sex: Female at 7/16/2023  4:51 AM     Recent Labs   Lab 07/16/23  1201   AMMONIA 48     Strict input /output monitor, daily  weights        VTE Risk Mitigation (From admission, onward)         Ordered     Place sequential compression device  Until discontinued         07/14/23 1024     Reason for No Pharmacological VTE Prophylaxis  Once        Question:  Reasons:  Answer:  Active Bleeding    07/05/23 1334     IP VTE HIGH RISK PATIENT  Once         07/05/23 1334                Discharge Planning   BRAYAN: 7/25/2023     Code Status: DNR   Is the patient medically ready for discharge?: No    Reason for patient still in hospital (select all that apply): Patient unstable  Discharge Plan A: Skilled Nursing Facility   Discharge Delays: None known at this time              Jermain Coates MD  Department of Hospital Medicine   [facility] - Telemetry Stepdown

## 2023-07-22 NOTE — SUBJECTIVE & OBJECTIVE
Interval History: Plan on placement early this week. MS is improving.    Review of Systems  Objective:     Vital Signs (Most Recent):  Temp: 98.5 °F (36.9 °C) (07/22/23 0800)  Pulse: 84 (07/22/23 0914)  Resp: 18 (07/22/23 0914)  BP: (!) 106/56 (07/22/23 0800)  SpO2: 99 % (07/22/23 0914) Vital Signs (24h Range):  Temp:  [97.4 °F (36.3 °C)-99 °F (37.2 °C)] 98.5 °F (36.9 °C)  Pulse:  [] 84  Resp:  [18-20] 18  SpO2:  [90 %-99 %] 99 %  BP: (106-141)/(56-63) 106/56     Weight: 61.7 kg (136 lb 0.4 oz)  Body mass index is 24.88 kg/m².    Intake/Output Summary (Last 24 hours) at 7/22/2023 1023  Last data filed at 7/22/2023 0631  Gross per 24 hour   Intake 200 ml   Output 1000 ml   Net -800 ml         Physical Exam  Constitutional:       Appearance: She is ill-appearing and toxic-appearing.   HENT:      Head: Normocephalic.   Eyes:      General: Scleral icterus present.   Neck:      Thyroid: No thyroid mass or thyroid tenderness.      Vascular: No carotid bruit or hepatojugular reflux.   Cardiovascular:      Rate and Rhythm: Normal rate.      Pulses: Decreased pulses.      Heart sounds: Heart sounds are distant.   Chest:      Chest wall: No mass, lacerations or deformity.   Breasts:     Right: No swelling.      Left: No swelling.   Abdominal:      General: Abdomen is flat.      Palpations: Abdomen is rigid.      Hernia: No hernia is present.   Genitourinary:     Vagina: Normal.   Musculoskeletal:      Cervical back: Rigidity present.   Lymphadenopathy:      Cervical: No cervical adenopathy.   Skin:     Coloration: Skin is jaundiced.      Findings: Ecchymosis present.   Neurological:      Mental Status: She is unresponsive.           Significant Labs: All pertinent labs within the past 24 hours have been reviewed.  BMP:   Recent Labs   Lab 07/21/23  0506   *      K 4.2   *   CO2 18*   BUN 9   CREATININE 0.4*   CALCIUM 8.0*   MG 1.8       Significant Imaging: I have reviewed all pertinent imaging  results/findings within the past 24 hours.

## 2023-07-23 LAB
ALBUMIN SERPL BCP-MCNC: 2 G/DL (ref 3.5–5.2)
ALP SERPL-CCNC: 89 U/L (ref 55–135)
ALT SERPL W/O P-5'-P-CCNC: 12 U/L (ref 10–44)
ANION GAP SERPL CALC-SCNC: 5 MMOL/L (ref 8–16)
APTT PPP: 33.6 SEC (ref 21–32)
AST SERPL-CCNC: 29 U/L (ref 10–40)
BASOPHILS # BLD AUTO: 0.02 K/UL (ref 0–0.2)
BASOPHILS NFR BLD: 0.8 % (ref 0–1.9)
BILIRUB SERPL-MCNC: 3 MG/DL (ref 0.1–1)
BUN SERPL-MCNC: 6 MG/DL (ref 6–20)
CA-I BLDV-SCNC: 1.22 MMOL/L (ref 1.06–1.42)
CA-I BLDV-SCNC: 1.23 MMOL/L (ref 1.06–1.42)
CALCIUM SERPL-MCNC: 7.5 MG/DL (ref 8.7–10.5)
CHLORIDE SERPL-SCNC: 116 MMOL/L (ref 95–110)
CO2 SERPL-SCNC: 20 MMOL/L (ref 23–29)
CREAT SERPL-MCNC: 0.3 MG/DL (ref 0.5–1.4)
DIFFERENTIAL METHOD: ABNORMAL
EOSINOPHIL # BLD AUTO: 0.1 K/UL (ref 0–0.5)
EOSINOPHIL NFR BLD: 5 % (ref 0–8)
ERYTHROCYTE [DISTWIDTH] IN BLOOD BY AUTOMATED COUNT: 25.8 % (ref 11.5–14.5)
EST. GFR  (NO RACE VARIABLE): >60 ML/MIN/1.73 M^2
GLUCOSE SERPL-MCNC: 73 MG/DL (ref 70–110)
HCT VFR BLD AUTO: 25.6 % (ref 37–48.5)
HGB BLD-MCNC: 7.9 G/DL (ref 12–16)
IMM GRANULOCYTES # BLD AUTO: 0.01 K/UL (ref 0–0.04)
IMM GRANULOCYTES NFR BLD AUTO: 0.4 % (ref 0–0.5)
INR PPP: 1.5 (ref 0.8–1.2)
LYMPHOCYTES # BLD AUTO: 0.5 K/UL (ref 1–4.8)
LYMPHOCYTES NFR BLD: 20.2 % (ref 18–48)
MAGNESIUM SERPL-MCNC: 1.7 MG/DL (ref 1.6–2.6)
MCH RBC QN AUTO: 32.4 PG (ref 27–31)
MCHC RBC AUTO-ENTMCNC: 30.9 G/DL (ref 32–36)
MCV RBC AUTO: 105 FL (ref 82–98)
MONOCYTES # BLD AUTO: 0.2 K/UL (ref 0.3–1)
MONOCYTES NFR BLD: 9.9 % (ref 4–15)
NEUTROPHILS # BLD AUTO: 1.5 K/UL (ref 1.8–7.7)
NEUTROPHILS NFR BLD: 63.7 % (ref 38–73)
NRBC BLD-RTO: 0 /100 WBC
PLATELET # BLD AUTO: 44 K/UL (ref 150–450)
PMV BLD AUTO: 10.9 FL (ref 9.2–12.9)
POCT GLUCOSE: 108 MG/DL (ref 70–110)
POCT GLUCOSE: 144 MG/DL (ref 70–110)
POCT GLUCOSE: 161 MG/DL (ref 70–110)
POCT GLUCOSE: 73 MG/DL (ref 70–110)
POTASSIUM SERPL-SCNC: 3.5 MMOL/L (ref 3.5–5.1)
PROT SERPL-MCNC: 4.6 G/DL (ref 6–8.4)
PROTHROMBIN TIME: 15.9 SEC (ref 9–12.5)
RBC # BLD AUTO: 2.44 M/UL (ref 4–5.4)
SODIUM SERPL-SCNC: 141 MMOL/L (ref 136–145)
TB INDURATION 48 - 72 HR READ: 0 MM
WBC # BLD AUTO: 2.42 K/UL (ref 3.9–12.7)

## 2023-07-23 PROCEDURE — 94668 MNPJ CHEST WALL SBSQ: CPT

## 2023-07-23 PROCEDURE — 27000221 HC OXYGEN, UP TO 24 HOURS

## 2023-07-23 PROCEDURE — 85730 THROMBOPLASTIN TIME PARTIAL: CPT

## 2023-07-23 PROCEDURE — 36415 COLL VENOUS BLD VENIPUNCTURE: CPT

## 2023-07-23 PROCEDURE — 25000242 PHARM REV CODE 250 ALT 637 W/ HCPCS: Performed by: HOSPITALIST

## 2023-07-23 PROCEDURE — 25000003 PHARM REV CODE 250

## 2023-07-23 PROCEDURE — 99232 PR SUBSEQUENT HOSPITAL CARE,LEVL II: ICD-10-PCS | Mod: ,,, | Performed by: INTERNAL MEDICINE

## 2023-07-23 PROCEDURE — 82330 ASSAY OF CALCIUM: CPT

## 2023-07-23 PROCEDURE — 85610 PROTHROMBIN TIME: CPT | Performed by: HOSPITALIST

## 2023-07-23 PROCEDURE — C9113 INJ PANTOPRAZOLE SODIUM, VIA: HCPCS | Performed by: STUDENT IN AN ORGANIZED HEALTH CARE EDUCATION/TRAINING PROGRAM

## 2023-07-23 PROCEDURE — 83735 ASSAY OF MAGNESIUM: CPT

## 2023-07-23 PROCEDURE — 99232 SBSQ HOSP IP/OBS MODERATE 35: CPT | Mod: ,,, | Performed by: INTERNAL MEDICINE

## 2023-07-23 PROCEDURE — 63600175 PHARM REV CODE 636 W HCPCS: Performed by: STUDENT IN AN ORGANIZED HEALTH CARE EDUCATION/TRAINING PROGRAM

## 2023-07-23 PROCEDURE — 80053 COMPREHEN METABOLIC PANEL: CPT

## 2023-07-23 PROCEDURE — 99900035 HC TECH TIME PER 15 MIN (STAT)

## 2023-07-23 PROCEDURE — 94640 AIRWAY INHALATION TREATMENT: CPT

## 2023-07-23 PROCEDURE — 27201109 HC SYSTEM FECAL MANAGEMENT

## 2023-07-23 PROCEDURE — 85025 COMPLETE CBC W/AUTO DIFF WBC: CPT

## 2023-07-23 PROCEDURE — 20600001 HC STEP DOWN PRIVATE ROOM

## 2023-07-23 PROCEDURE — 25000003 PHARM REV CODE 250: Performed by: HOSPITALIST

## 2023-07-23 RX ADMIN — LACTULOSE 10 G: 20 SOLUTION ORAL at 08:07

## 2023-07-23 RX ADMIN — CLARITHROMYCIN 500 MG: 500 TABLET, FILM COATED ORAL at 08:07

## 2023-07-23 RX ADMIN — LEVETIRACETAM 1000 MG: 500 SOLUTION ORAL at 08:07

## 2023-07-23 RX ADMIN — IPRATROPIUM BROMIDE AND ALBUTEROL SULFATE 3 ML: 2.5; .5 SOLUTION RESPIRATORY (INHALATION) at 07:07

## 2023-07-23 RX ADMIN — PANTOPRAZOLE SODIUM 40 MG: 40 INJECTION, POWDER, FOR SOLUTION INTRAVENOUS at 08:07

## 2023-07-23 RX ADMIN — AMOXICILLIN 1000 MG: 500 CAPSULE ORAL at 08:07

## 2023-07-23 RX ADMIN — ACETYLCYSTEINE 2 ML: 200 SOLUTION ORAL; RESPIRATORY (INHALATION) at 08:07

## 2023-07-23 RX ADMIN — ACETYLCYSTEINE 2 ML: 200 SOLUTION ORAL; RESPIRATORY (INHALATION) at 02:07

## 2023-07-23 RX ADMIN — INSULIN ASPART 2 UNITS: 100 INJECTION, SOLUTION INTRAVENOUS; SUBCUTANEOUS at 12:07

## 2023-07-23 RX ADMIN — OLANZAPINE 5 MG: 5 TABLET, FILM COATED ORAL at 08:07

## 2023-07-23 RX ADMIN — IPRATROPIUM BROMIDE AND ALBUTEROL SULFATE 3 ML: 2.5; .5 SOLUTION RESPIRATORY (INHALATION) at 02:07

## 2023-07-23 RX ADMIN — LACTULOSE 10 G: 20 SOLUTION ORAL at 02:07

## 2023-07-23 RX ADMIN — IPRATROPIUM BROMIDE AND ALBUTEROL SULFATE 3 ML: 2.5; .5 SOLUTION RESPIRATORY (INHALATION) at 08:07

## 2023-07-23 RX ADMIN — ACETYLCYSTEINE 2 ML: 200 SOLUTION ORAL; RESPIRATORY (INHALATION) at 07:07

## 2023-07-23 NOTE — PLAN OF CARE
Problem: Infection  Goal: Absence of Infection Signs and Symptoms  Outcome: Ongoing, Progressing     Problem: Adult Inpatient Plan of Care  Goal: Plan of Care Review  Outcome: Ongoing, Progressing  Goal: Patient-Specific Goal (Individualized)  Outcome: Ongoing, Progressing  Goal: Absence of Hospital-Acquired Illness or Injury  Outcome: Ongoing, Progressing  Goal: Optimal Comfort and Wellbeing  Outcome: Ongoing, Progressing  Goal: Readiness for Transition of Care  Outcome: Ongoing, Progressing     Problem: Fluid and Electrolyte Imbalance (Acute Kidney Injury/Impairment)  Goal: Fluid and Electrolyte Balance  Outcome: Ongoing, Progressing     Problem: Oral Intake Inadequate (Acute Kidney Injury/Impairment)  Goal: Optimal Nutrition Intake  Outcome: Ongoing, Progressing     Problem: Renal Function Impairment (Acute Kidney Injury/Impairment)  Goal: Effective Renal Function  Outcome: Ongoing, Progressing     Problem: Impaired Wound Healing  Goal: Optimal Wound Healing  Outcome: Ongoing, Progressing     Problem: Fall Injury Risk  Goal: Absence of Fall and Fall-Related Injury  Outcome: Ongoing, Progressing     Problem: Restraint, Nonbehavioral (Nonviolent)  Goal: Absence of Harm or Injury  Outcome: Ongoing, Progressing     Problem: Nutrition Impairment (Mechanical Ventilation, Invasive)  Goal: Optimal Nutrition Delivery  Outcome: Ongoing, Progressing     Problem: Skin and Tissue Injury (Mechanical Ventilation, Invasive)  Goal: Absence of Device-Related Skin and Tissue Injury  Outcome: Ongoing, Progressing     Problem: Ventilator-Induced Lung Injury (Mechanical Ventilation, Invasive)  Goal: Absence of Ventilator-Induced Lung Injury  Outcome: Ongoing, Progressing     Problem: Communication Impairment (Artificial Airway)  Goal: Effective Communication  Outcome: Ongoing, Progressing     Problem: Device-Related Complication Risk (Artificial Airway)  Goal: Optimal Device Function  Outcome: Ongoing, Progressing     Problem: Skin  and Tissue Injury (Artificial Airway)  Goal: Absence of Device-Related Skin or Tissue Injury  Outcome: Ongoing, Progressing     Problem: Skin Injury Risk Increased  Goal: Skin Health and Integrity  Outcome: Ongoing, Progressing     Pt awake and alert. VSS and WNL for the shift. Given pain medication for discomfort overnight. Pt rested well. Given a bath this AM, linen change. Turned.  Glucose 73 on morning POCT. Pt fed pudding and apple sauce. All needs met. Safety precautions in place.

## 2023-07-23 NOTE — PROGRESS NOTES
[facility] - Telemetry [facility] Medicine  Progress Note    Patient Name: Marely Hamilton  MRN: [id number]  Patient Class: IP- Inpatient   Admission Date: 7/5/2023  Length of Stay: 18 days  Attending Physician: Jermain Coates MD  Primary Care Provider: Viktor Ross MD        Subjective:     Principal Problem:Gastrointestinal hemorrhage associated with duodenal ulcer  Acute Condition: confusion        HPI:  Marely Hamilton is a 58 y.o. female with history of alcoholic cirrhosis, seizure disorder, DVT and IJ thrombus with recent admission for large retroperitoneal hemorrhage requiring IR embolization and IVC filter placement who presents for hematochezia. Onset this morning at [facility], alida blood per rectum per chart, sent to ED. Initially normotensive but then severe hypotension prompted initiation of levophed and intubation. Hgb 6.8, 2u pRBC given + 1u FFP ordered. Hgb 8.5 on 7/2. On levo and vaso currently. K 6.6 but renal function at baseline. Repeat pending. No prior EGDs on record.          Overview/Hospital Course:    Patient was seen at bedside, hematochezia still present post op by IR. Patient has required many transfusions of pRBCs and platelets due to ongoing down trending of Hgb. No clear source of ongoing hemorrhage by imaging. Patient has continued to require pressors to maintain MAP >65. Discussed with GI and IR regarding active bleeding post op, IR indicated there is not much else to do from their end bc they embolized a significant part of GDA. Pt is off of pressor requirement and not actively bleeding. GI re evaluated pt via EGD and found no sources of active bleeding. Pt is extubated and currently on BIPAP requirement due to de saturation in the 80's. Pt is to be seen by speech and PT/OT. Clear mucinous secretions via suction, chest physiotherapy, and cough assist device. Nebs to help with breathing as well. Pt is off BIPAP and currently on comfort flow. Triple therapy (amoxicillin,  clarithromycin) started for 14 days to treat for positive H pylori serology. Pt also had a NG tube placed so we may begin tube feedings. CxR, EKG, and ABG displayed no reason for tachycardia. Pt becomes anxious when seen by different provider teams. Remove art line and consult for midline placement. Continue to wean comfort flow as tolerated. Ensure pt is working with PT/OT/Speech for continuous improvement. Consult psych regarding patient history of bipolar disorder.         Interval History/Significant Events: Wean comfort flow as tolerated. Ensure patient is working with PT/OT/Speech for continuous improvement. Consult psych regarding psych history     7/16   history of alcoholic cirrhosis, seizure disorder, DVT and IJ thrombus with recent admission for large retroperitoneal hemorrhage requiring IR embolization and IVC filter placement who presents for hematochezia. s/p GI and IR in which they clipped (GI) and embolized (IR) possible sources of duodenal ulcer bleeding.  Hb stable  s/p extubation. sats 92% on 5L of NC.  CX ray - Detrimental changes since the previous examination including interval increase in pulmonary vascularity and bilateral diffuse interstitial pulmonary parenchymal opacification, progression of abnormal opacification at the left lung base, and development of small right-sided pleural effusion.   findings would be consistent with congestive heart failure.PT/OT recs SNF. BNP , procalcitonin and Echo.  SLP recs NPO .  On NGT feeds. psychiatry following for bipolar disorders.  Recs Zyprexa 5 mg QHS . Ammonia 48 WNL. AAOX 1. Hepatology consulted for cirrhosis. UA ordered   7/17 Saldaña removed. UA +. UC pending. started on IV ceftriaxone. ammonia at 59. on B/L UE mittens as pulled of NGT. Hepatology eval. resumed lactulose . discussed with sister and updated clinical status   7/18  MBS today - started puree nectar thick liquids.  on oxygen 3L via NC.   wean  as tolerated . UC -YEAST 10,000 - 49,999  cfu/ml Identification pending No other significant isolate. hydroxyzine  PRN discontinued  due to it not being safe in delirium. oriented to person, hospital and year   7/19 SLP recs - tolerating Puree Diet - IDDSI Level 4, Mildly thick/Nectar thick liquids - IDDSI Level 2 .stool output 600ml/ monitor on lactulose . improved mentation AAOX 3      Interval History: Plan on dc to SNF this week, doing well.    Review of Systems  Objective:     Vital Signs (Most Recent):  Temp: 98.6 °F (37 °C) (07/23/23 0800)  Pulse: 76 (07/23/23 0807)  Resp: 18 (07/23/23 0807)  BP: 107/67 (07/23/23 0800)  SpO2: 96 % (07/23/23 1019) Vital Signs (24h Range):  Temp:  [97.9 °F (36.6 °C)-98.8 °F (37.1 °C)] 98.6 °F (37 °C)  Pulse:  [72-96] 76  Resp:  [16-20] 18  SpO2:  [93 %-97 %] 96 %  BP: (107-129)/(54-71) 107/67     Weight: 61.7 kg (136 lb 0.4 oz)  Body mass index is 24.88 kg/m².    Intake/Output Summary (Last 24 hours) at 7/23/2023 1104  Last data filed at 7/23/2023 0631  Gross per 24 hour   Intake 200 ml   Output 500 ml   Net -300 ml         Physical Exam  Constitutional:       Appearance: She is ill-appearing and toxic-appearing.   HENT:      Head: Normocephalic.   Eyes:      General: Scleral icterus present.   Neck:      Thyroid: No thyroid mass or thyroid tenderness.      Vascular: No carotid bruit or hepatojugular reflux.   Cardiovascular:      Rate and Rhythm: Normal rate.      Pulses: Decreased pulses.      Heart sounds: Heart sounds are distant.   Chest:      Chest wall: No mass, lacerations or deformity.   Breasts:     Right: No swelling.      Left: No swelling.   Abdominal:      General: Abdomen is flat.      Palpations: Abdomen is rigid.      Hernia: No hernia is present.   Genitourinary:     Vagina: Normal.   Musculoskeletal:      Cervical back: Rigidity present.   Lymphadenopathy:      Cervical: No cervical adenopathy.   Skin:     Coloration: Skin is jaundiced.      Findings: Ecchymosis present.   Neurological:      Mental  Status: She is unresponsive.           Significant Labs: All pertinent labs within the past 24 hours have been reviewed.  BMP:   Recent Labs   Lab 07/23/23  0434   GLU 73      K 3.5   *   CO2 20*   BUN 6   CREATININE 0.3*   CALCIUM 7.5*   MG 1.7       Significant Imaging: I have reviewed all pertinent imaging results/findings within the past 24 hours.      Assessment/Plan:      * Gastrointestinal hemorrhage associated with duodenal ulcer    as  above        Dysphagia  7/18  MBS today -started puree nectar thick liquids.        Hypoxia   7/16 sats 92% on 5L of NC.  CX ray - Detrimental changes since the previous examination including interval increase in pulmonary vascularity and bilateral diffuse interstitial pulmonary parenchymal opacification, progression of abnormal opacification at the left lung base, and development of small right-sided pleural effusion.   findings would be consistent with congestive heart failure.PT/OT recs SNF. BNP , procalcitonin and Echo.     H. pylori infection    positive serology for H pylori, begin triple therapy with clarithromycin,amoxicillin, and PPI for 14 days       Hematochezia    - GI EGD clipped duodenal ulcers for IR reference  - Transfuse blood products for active bleeding   - trend CBC and follow  - IR embolized GDA for duodenal hyperemia   -- GI re evaluated site of duodenal ulcers, no noted active bleeding  -- Continue PPI BID for 8 weeks, then once daily after that  --Pt had positive serology for H pylori, begin triple therapy with clarithromycin,amoxicillin, and PPI for 14 days      Acute blood loss anemia     - Maintain IV access with 2 large bore Ivs  - Intravascular resuscitation/support with IVFs   - Hold all NSAIDs and anticoagulants, unless contraindicated.  - Please correct any coagulopathy with platelets and FFP for goal of platelets >50K and INR <2.0  - Please notify GI team if there is significant change in patient's clinical status  - To date,  patient has required at least 21 units of pRBCs to maintain Hgb >7     7/16     Patient's with macrocytic anemia.. Hemoglobin stable. Etiology likely due to acute blood loss.  Current CBC reviewed-    Recent Labs   Lab 07/14/23  0752 07/15/23  0350 07/16/23  0825   HGB 8.6* 8.1* 8.0*         Component Value Date/Time     (H) 07/16/2023 0825    RDW 24.7 (H) 07/16/2023 0825    IRON 132 05/18/2023 1527    FERRITIN 110 05/18/2023 1527    RETIC 3.9 (H) 06/05/2023 0935    FOLATE 16.2 05/31/2023 1250    DOZYPSRN22 >2000 (H) 05/31/2023 1250    OCCULTBLOOD Negative 09/19/2015 0137     Monitor CBC and transfuse if H/H drops below 7/21.        Retroperitoneal bleed  Retroperitoneum: No significant adenopathy.  Similar sized left retroperitoneal hematoma measuring 11 x 9 cm (series 5, image 100; previously 11 x 9 cm).  The left iliacus collection also measures similar to prior at 5.4 cm (series 5, image 127; previously 5.6 cm).  Layering hyperdensity within these collections suggesting different ages in blood products.  No significant volume active extravasation into these collections  - CT-A demonstrated stable retroperitoneal hematoma. No need for intervention at this time.          Supratherapeutic INR  patient with INR   Recent Labs   Lab 07/17/23  0619 07/18/23  0525 07/19/23  0350   INR 1.9* 1.7* 1.6*   . INR is supratherapeutic. secondary to coagulopathy from liver disease.monitor      UTI (urinary tract infection)  7/17 Saldaña removed. UA +. UC pending. started on IV ceftriaxone.  7/18 UC -YEAST 10,000 - 49,999 cfu/ml Identification pending No other significant isolate    Bipolar 1 disorder     - Consult psych regarding Bipolar 1 disorder history       Hepatic encephalopathy   ammonia 190s on admit -->90s . AAOX 1. no lactulose give due to GI bleed  repeat ammonia. Hepatolog eval  with     MELD 3.0: 21 at 7/17/2023  6:19 AM  MELD-Na: 18 at 7/17/2023  6:19 AM  Calculated from:  Serum Creatinine: 0.4 mg/dL (Using  min of 1 mg/dL) at 7/17/2023  6:19 AM  Serum Sodium: 143 mmol/L (Using max of 137 mmol/L) at 7/17/2023  6:19 AM  Total Bilirubin: 3.0 mg/dL at 7/17/2023  6:19 AM  Serum Albumin: 2.2 g/dL at 7/17/2023  6:19 AM  INR(ratio): 1.9 at 7/17/2023  6:19 AM  Age at listing (hypothetical): 58 years  Sex: Female at 7/17/2023  6:19 AM  7/16 Ammonia 48 WNL. AAOX 1. Hepatology consulted for cirrhosis.    7/17  Hepatology eval. resumed lactulose.  7/18  hydroxyzine  PRN  discontinued  due to it not being safe in delirium    Severe protein-calorie malnutrition  Nutrition consulted. Most recent weight and BMI monitored-     Measurements:  Wt Readings from Last 1 Encounters:   07/16/23 62.7 kg (138 lb 4.4 oz)   Body mass index is 25.29 kg/m².    Patient has been screened and assessed by RD.    Malnutrition Type:  Context: social/environmental circumstances  Level: severe    Malnutrition Characteristic Summary:  Energy Intake (Malnutrition): less than or equal to 75% for greater than or equal to 1 month  Subcutaneous Fat (Malnutrition): severe depletion  Muscle Mass (Malnutrition): severe depletion      History of seizure  on Keppra via NGT       Thrombocytopenia  with cirrhosis   Patient with thrombocytopenia   Recent Labs   Lab 07/17/23  0619 07/18/23  0525 07/19/23  0350   PLT 34* 32* 34*   . Platelet counts stable  .monitor      Cirrhosis, Laennec's  alcoholic cirrhosis  MELD 3.0: 20 at 7/16/2023  4:51 AM  MELD-Na: 17 at 7/16/2023  4:51 AM  Calculated from:  Serum Creatinine: 0.4 mg/dL (Using min of 1 mg/dL) at 7/16/2023  4:51 AM  Serum Sodium: 145 mmol/L (Using max of 137 mmol/L) at 7/16/2023  4:51 AM  Total Bilirubin: 3.0 mg/dL at 7/16/2023  4:51 AM  Serum Albumin: 2.3 g/dL at 7/16/2023  4:51 AM  INR(ratio): 1.8 at 7/15/2023  3:50 AM  Age at listing (hypothetical): 58 years  Sex: Female at 7/16/2023  4:51 AM     Recent Labs   Lab 07/16/23  1201   AMMONIA 48     Strict input /output monitor, daily weights        VTE Risk  Mitigation (From admission, onward)         Ordered     Place sequential compression device  Until discontinued         07/14/23 1024     Reason for No Pharmacological VTE Prophylaxis  Once        Question:  Reasons:  Answer:  Active Bleeding    07/05/23 1334     IP VTE HIGH RISK PATIENT  Once         07/05/23 1334                Discharge Planning   BRAYAN: 7/25/2023     Code Status: DNR   Is the patient medically ready for discharge?: No    Reason for patient still in hospital (select all that apply): Patient unstable  Discharge Plan A: Skilled Nursing Facility   Discharge Delays: None known at this time              Jermain Coates MD  Department of Hospital Medicine   [facility] - Telemetry Stepdown

## 2023-07-23 NOTE — SUBJECTIVE & OBJECTIVE
Interval History: Plan on dc to SNF this week, doing well.    Review of Systems  Objective:     Vital Signs (Most Recent):  Temp: 98.6 °F (37 °C) (07/23/23 0800)  Pulse: 76 (07/23/23 0807)  Resp: 18 (07/23/23 0807)  BP: 107/67 (07/23/23 0800)  SpO2: 96 % (07/23/23 1019) Vital Signs (24h Range):  Temp:  [97.9 °F (36.6 °C)-98.8 °F (37.1 °C)] 98.6 °F (37 °C)  Pulse:  [72-96] 76  Resp:  [16-20] 18  SpO2:  [93 %-97 %] 96 %  BP: (107-129)/(54-71) 107/67     Weight: 61.7 kg (136 lb 0.4 oz)  Body mass index is 24.88 kg/m².    Intake/Output Summary (Last 24 hours) at 7/23/2023 1104  Last data filed at 7/23/2023 0631  Gross per 24 hour   Intake 200 ml   Output 500 ml   Net -300 ml         Physical Exam  Constitutional:       Appearance: She is ill-appearing and toxic-appearing.   HENT:      Head: Normocephalic.   Eyes:      General: Scleral icterus present.   Neck:      Thyroid: No thyroid mass or thyroid tenderness.      Vascular: No carotid bruit or hepatojugular reflux.   Cardiovascular:      Rate and Rhythm: Normal rate.      Pulses: Decreased pulses.      Heart sounds: Heart sounds are distant.   Chest:      Chest wall: No mass, lacerations or deformity.   Breasts:     Right: No swelling.      Left: No swelling.   Abdominal:      General: Abdomen is flat.      Palpations: Abdomen is rigid.      Hernia: No hernia is present.   Genitourinary:     Vagina: Normal.   Musculoskeletal:      Cervical back: Rigidity present.   Lymphadenopathy:      Cervical: No cervical adenopathy.   Skin:     Coloration: Skin is jaundiced.      Findings: Ecchymosis present.   Neurological:      Mental Status: She is unresponsive.           Significant Labs: All pertinent labs within the past 24 hours have been reviewed.  BMP:   Recent Labs   Lab 07/23/23  0434   GLU 73      K 3.5   *   CO2 20*   BUN 6   CREATININE 0.3*   CALCIUM 7.5*   MG 1.7       Significant Imaging: I have reviewed all pertinent imaging results/findings within the  past 24 hours.

## 2023-07-24 LAB
ALBUMIN SERPL BCP-MCNC: 2 G/DL (ref 3.5–5.2)
ALP SERPL-CCNC: 100 U/L (ref 55–135)
ALT SERPL W/O P-5'-P-CCNC: 14 U/L (ref 10–44)
ANION GAP SERPL CALC-SCNC: 4 MMOL/L (ref 8–16)
APTT PPP: 30.8 SEC (ref 21–32)
AST SERPL-CCNC: 31 U/L (ref 10–40)
BASOPHILS # BLD AUTO: 0.01 K/UL (ref 0–0.2)
BASOPHILS NFR BLD: 0.3 % (ref 0–1.9)
BILIRUB SERPL-MCNC: 3.2 MG/DL (ref 0.1–1)
BUN SERPL-MCNC: 6 MG/DL (ref 6–20)
CA-I BLDV-SCNC: 1.18 MMOL/L (ref 1.06–1.42)
CA-I BLDV-SCNC: 1.2 MMOL/L (ref 1.06–1.42)
CALCIUM SERPL-MCNC: 7.7 MG/DL (ref 8.7–10.5)
CHLORIDE SERPL-SCNC: 116 MMOL/L (ref 95–110)
CO2 SERPL-SCNC: 19 MMOL/L (ref 23–29)
CREAT SERPL-MCNC: 0.3 MG/DL (ref 0.5–1.4)
DIFFERENTIAL METHOD: ABNORMAL
EOSINOPHIL # BLD AUTO: 0.1 K/UL (ref 0–0.5)
EOSINOPHIL NFR BLD: 2 % (ref 0–8)
ERYTHROCYTE [DISTWIDTH] IN BLOOD BY AUTOMATED COUNT: 25.5 % (ref 11.5–14.5)
EST. GFR  (NO RACE VARIABLE): >60 ML/MIN/1.73 M^2
GLUCOSE SERPL-MCNC: 89 MG/DL (ref 70–110)
HCT VFR BLD AUTO: 25.7 % (ref 37–48.5)
HGB BLD-MCNC: 8 G/DL (ref 12–16)
IMM GRANULOCYTES # BLD AUTO: 0 K/UL (ref 0–0.04)
IMM GRANULOCYTES NFR BLD AUTO: 0 % (ref 0–0.5)
LYMPHOCYTES # BLD AUTO: 0.5 K/UL (ref 1–4.8)
LYMPHOCYTES NFR BLD: 16.3 % (ref 18–48)
MAGNESIUM SERPL-MCNC: 1.6 MG/DL (ref 1.6–2.6)
MCH RBC QN AUTO: 31.9 PG (ref 27–31)
MCHC RBC AUTO-ENTMCNC: 31.1 G/DL (ref 32–36)
MCV RBC AUTO: 102 FL (ref 82–98)
MONOCYTES # BLD AUTO: 0.3 K/UL (ref 0.3–1)
MONOCYTES NFR BLD: 8.8 % (ref 4–15)
NEUTROPHILS # BLD AUTO: 2.1 K/UL (ref 1.8–7.7)
NEUTROPHILS NFR BLD: 72.6 % (ref 38–73)
NRBC BLD-RTO: 0 /100 WBC
PLATELET # BLD AUTO: 53 K/UL (ref 150–450)
PMV BLD AUTO: 9.8 FL (ref 9.2–12.9)
POCT GLUCOSE: 125 MG/DL (ref 70–110)
POCT GLUCOSE: 150 MG/DL (ref 70–110)
POCT GLUCOSE: 170 MG/DL (ref 70–110)
POCT GLUCOSE: 89 MG/DL (ref 70–110)
POCT GLUCOSE: 97 MG/DL (ref 70–110)
POTASSIUM SERPL-SCNC: 3.8 MMOL/L (ref 3.5–5.1)
PROT SERPL-MCNC: 4.9 G/DL (ref 6–8.4)
RBC # BLD AUTO: 2.51 M/UL (ref 4–5.4)
SODIUM SERPL-SCNC: 139 MMOL/L (ref 136–145)
WBC # BLD AUTO: 2.95 K/UL (ref 3.9–12.7)

## 2023-07-24 PROCEDURE — 20600001 HC STEP DOWN PRIVATE ROOM

## 2023-07-24 PROCEDURE — 27000221 HC OXYGEN, UP TO 24 HOURS

## 2023-07-24 PROCEDURE — 25000003 PHARM REV CODE 250

## 2023-07-24 PROCEDURE — 99232 SBSQ HOSP IP/OBS MODERATE 35: CPT | Mod: ,,, | Performed by: INTERNAL MEDICINE

## 2023-07-24 PROCEDURE — 36415 COLL VENOUS BLD VENIPUNCTURE: CPT

## 2023-07-24 PROCEDURE — 94668 MNPJ CHEST WALL SBSQ: CPT

## 2023-07-24 PROCEDURE — 99232 PR SUBSEQUENT HOSPITAL CARE,LEVL II: ICD-10-PCS | Mod: ,,, | Performed by: INTERNAL MEDICINE

## 2023-07-24 PROCEDURE — 97112 NEUROMUSCULAR REEDUCATION: CPT

## 2023-07-24 PROCEDURE — 25000003 PHARM REV CODE 250: Performed by: HOSPITALIST

## 2023-07-24 PROCEDURE — 25000242 PHARM REV CODE 250 ALT 637 W/ HCPCS: Performed by: HOSPITALIST

## 2023-07-24 PROCEDURE — 85025 COMPLETE CBC W/AUTO DIFF WBC: CPT

## 2023-07-24 PROCEDURE — 92526 ORAL FUNCTION THERAPY: CPT

## 2023-07-24 PROCEDURE — 82330 ASSAY OF CALCIUM: CPT | Mod: 91

## 2023-07-24 PROCEDURE — 83735 ASSAY OF MAGNESIUM: CPT

## 2023-07-24 PROCEDURE — 97535 SELF CARE MNGMENT TRAINING: CPT

## 2023-07-24 PROCEDURE — 80053 COMPREHEN METABOLIC PANEL: CPT

## 2023-07-24 PROCEDURE — 94640 AIRWAY INHALATION TREATMENT: CPT

## 2023-07-24 PROCEDURE — 94761 N-INVAS EAR/PLS OXIMETRY MLT: CPT

## 2023-07-24 PROCEDURE — 85730 THROMBOPLASTIN TIME PARTIAL: CPT

## 2023-07-24 PROCEDURE — C9113 INJ PANTOPRAZOLE SODIUM, VIA: HCPCS | Performed by: STUDENT IN AN ORGANIZED HEALTH CARE EDUCATION/TRAINING PROGRAM

## 2023-07-24 PROCEDURE — 99900035 HC TECH TIME PER 15 MIN (STAT)

## 2023-07-24 PROCEDURE — 63600175 PHARM REV CODE 636 W HCPCS: Performed by: STUDENT IN AN ORGANIZED HEALTH CARE EDUCATION/TRAINING PROGRAM

## 2023-07-24 RX ADMIN — LACTULOSE 10 G: 20 SOLUTION ORAL at 08:07

## 2023-07-24 RX ADMIN — AMOXICILLIN 1000 MG: 500 CAPSULE ORAL at 08:07

## 2023-07-24 RX ADMIN — IPRATROPIUM BROMIDE AND ALBUTEROL SULFATE 3 ML: 2.5; .5 SOLUTION RESPIRATORY (INHALATION) at 02:07

## 2023-07-24 RX ADMIN — CLARITHROMYCIN 500 MG: 500 TABLET, FILM COATED ORAL at 08:07

## 2023-07-24 RX ADMIN — LEVETIRACETAM 1000 MG: 500 SOLUTION ORAL at 08:07

## 2023-07-24 RX ADMIN — ACETYLCYSTEINE 2 ML: 200 SOLUTION ORAL; RESPIRATORY (INHALATION) at 02:07

## 2023-07-24 RX ADMIN — IPRATROPIUM BROMIDE AND ALBUTEROL SULFATE 3 ML: 2.5; .5 SOLUTION RESPIRATORY (INHALATION) at 07:07

## 2023-07-24 RX ADMIN — ACETYLCYSTEINE 2 ML: 200 SOLUTION ORAL; RESPIRATORY (INHALATION) at 08:07

## 2023-07-24 RX ADMIN — IPRATROPIUM BROMIDE AND ALBUTEROL SULFATE 3 ML: 2.5; .5 SOLUTION RESPIRATORY (INHALATION) at 08:07

## 2023-07-24 RX ADMIN — LACTULOSE 10 G: 20 SOLUTION ORAL at 03:07

## 2023-07-24 RX ADMIN — OLANZAPINE 5 MG: 5 TABLET, FILM COATED ORAL at 08:07

## 2023-07-24 RX ADMIN — PANTOPRAZOLE SODIUM 40 MG: 40 INJECTION, POWDER, FOR SOLUTION INTRAVENOUS at 08:07

## 2023-07-24 RX ADMIN — ACETYLCYSTEINE 2 ML: 200 SOLUTION ORAL; RESPIRATORY (INHALATION) at 07:07

## 2023-07-24 RX ADMIN — INSULIN ASPART 2 UNITS: 100 INJECTION, SOLUTION INTRAVENOUS; SUBCUTANEOUS at 12:07

## 2023-07-24 NOTE — PT/OT/SLP PROGRESS
"Speech Language Pathology Treatment    Patient Name:  Marely Hamilton   MRN:  [id number]  Admitting Diagnosis: Gastrointestinal hemorrhage associated with duodenal ulcer    Recommendations:                 General Recommendations:  Dysphagia therapy  Diet recommendations:  Puree, Liquid Diet Level: Nectar Thick   Aspiration Precautions: 1 bite/sip at a time, Assistance with meals and Assistance with thickening liquids, Eliminate distractions, Feed only when awake/alert, Frequent oral care, HOB to 90 degrees, Monitor for s/s of aspiration, Remain upright 30 minutes post meal, Small bites/sips, and Strict aspiration precautions   General Precautions: Standard, aspiration, fall, pureed diet, nectar thick  Communication strategies:  provide increased time to answer and go to room if call light pushed    Assessment:     Marely Hamilton is a 58 y.o. female with an SLP diagnosis of mild Oropharyngeal dysphagia characterized by high risk for aspiration with thin liquids and residue with purees. Cognitive status and impulsivity also increase pt aspiration risk during meals. SLP to continue to follow.     Subjective     "You pour the boiling water over the noodles" (Pt's response when asked how the MBSS went)    Spoke with RN prior to entering pt's room . Pt seen bedside for session. Alert and agreeable to ST, intermittent confusion and confabulations.       Pain/Comfort:  Pain Rating 1: 0/10  Pain Rating Post-Intervention 1: 0/10    Respiratory Status: Nasal cannula, flow 2 L/min    Objective:     Has the patient been evaluated by SLP for swallowing?   Yes  Keep patient NPO? No   Current Respiratory Status:   nasal cannula     Pt seen for ongoing assessment of swallow function. She accepted trials of thin liquids via tsp x5, cup edge sips x5, nectar thick liquids via cup edge x3, puree via tsp x3, and regular solids (crackers). Oral phase appeared functional, with no anterior loss, adequate mastication, timely AP transit. " Pharyngeal phase also appeared functional, with no observable s/sx airway threat. Vocal quality remained clear. However, pt impulsive with self feeding placing her at increased risk for aspiration. ST to continue upgraded trials x1 more day prior to upgrade. Provided education re: role of ST, POC, diet recs, and swallow precautions. Pt will require reinforcement. At end of session, pt remained bedside with call light and all needs in reach. RN present for med administration. White board updated.      Goals:   Multidisciplinary Problems       SLP Goals          Problem: SLP    Goal Priority Disciplines Outcome   SLP Goal     SLP Ongoing, Progressing   Description: Speech Pathology Goals  To be met by 7/27/23    1. Pt will participate in ongoing diagnostic dysphagia therapy                              Plan:     Patient to be seen:  3 x/week   Plan of Care expires:  08/12/23  Plan of Care reviewed with:  patient   SLP Follow-Up:  Yes       Discharge recommendations:  nursing facility, skilled   Barriers to Discharge:  Level of Skilled Assistance Needed      Time Tracking:     SLP Treatment Date:   07/24/23  Speech Start Time:  0846  Speech Stop Time:  0902     Speech Total Time (min):  16 min    Billable Minutes: Treatment Swallowing Dysfunction 8 and Self Care/Home Management Training 8    07/24/2023

## 2023-07-24 NOTE — SUBJECTIVE & OBJECTIVE
Interval History: Ready for dc to SNF, MS is limited but at baseline.    Review of Systems  Objective:     Vital Signs (Most Recent):  Temp: (!) 100.4 °F (38 °C) (07/24/23 0730)  Pulse: 91 (07/24/23 0843)  Resp: (!) 22 (07/24/23 0843)  BP: (!) 117/56 (07/24/23 0730)  SpO2: 98 % (07/24/23 0843) Vital Signs (24h Range):  Temp:  [98.2 °F (36.8 °C)-100.4 °F (38 °C)] 100.4 °F (38 °C)  Pulse:  [86-98] 91  Resp:  [16-22] 22  SpO2:  [92 %-98 %] 98 %  BP: (116-158)/(56-70) 117/56     Weight: 61.7 kg (136 lb 0.4 oz)  Body mass index is 24.88 kg/m².    Intake/Output Summary (Last 24 hours) at 7/24/2023 0938  Last data filed at 7/24/2023 0906  Gross per 24 hour   Intake 270 ml   Output 1300 ml   Net -1030 ml         Physical Exam  Constitutional:       Appearance: She is ill-appearing and toxic-appearing.   HENT:      Head: Normocephalic.   Eyes:      General: Scleral icterus present.   Neck:      Thyroid: No thyroid mass or thyroid tenderness.      Vascular: No carotid bruit or hepatojugular reflux.   Cardiovascular:      Rate and Rhythm: Normal rate.      Pulses: Decreased pulses.      Heart sounds: Heart sounds are distant.   Chest:      Chest wall: No mass, lacerations or deformity.   Breasts:     Right: No swelling.      Left: No swelling.   Abdominal:      General: Abdomen is flat.      Palpations: Abdomen is rigid.      Hernia: No hernia is present.   Genitourinary:     Vagina: Normal.   Musculoskeletal:      Cervical back: Rigidity present.   Lymphadenopathy:      Cervical: No cervical adenopathy.   Skin:     Coloration: Skin is jaundiced.      Findings: Ecchymosis present.   Neurological:      Mental Status: She is unresponsive.           Significant Labs: All pertinent labs within the past 24 hours have been reviewed.  BMP:   Recent Labs   Lab 07/24/23  0513   GLU 89      K 3.8   *   CO2 19*   BUN 6   CREATININE 0.3*   CALCIUM 7.7*   MG 1.6       Significant Imaging: I have reviewed all pertinent imaging  results/findings within the past 24 hours.

## 2023-07-24 NOTE — PT/OT/SLP PROGRESS
Physical Therapy Treatment    Patient Name:  Marely Hamilton   MRN:  [id number]    Recommendations:     Discharge Recommendations: nursing facility, skilled  Discharge Equipment Recommendations: wheelchair, bedside commode, bath bench, grab bar (walk in shower with grab bars)  Barriers to discharge: None    Assessment:     Marely Hamilton is a 58 y.o. female admitted with a medical diagnosis of Gastrointestinal hemorrhage associated with duodenal ulcer.  She presents with the following impairments/functional limitations: weakness, impaired balance, decreased safety awareness, impaired endurance, impaired functional mobility, decreased lower extremity function, decreased coordination, impaired cognition . Patient demonstrated improved cognition, improved sitting balance requiring no assistance and increased LE strength and mobility at present session. Pt is progressing towards goals, but has not yet reached prior level of function. Marely Hamilton would benefit from continued acute PT intervention to improve quality of life, focus on recovery of impairments, provide patient/caregiver education, reduce fall risk, prevent deconditioning, and maximize independence and safety with functional mobility.  Currently recommending discharge to Skilled Nursing Facility  at this time to address deficits and progress towards prior level of function. Patient has made progress with mobility towards goals and demonstrates good prognosis for continued improvement with additional skilled therapy post-discharge.     Rehab Prognosis: Good; patient would benefit from acute skilled PT services to address these deficits and reach maximum level of function.    Recent Surgery: Procedure(s) (LRB):  EGD (ESOPHAGOGASTRODUODENOSCOPY) (N/A) 13 Days Post-Op    Plan:     During this hospitalization, patient to be seen 3 x/week to address the identified rehab impairments via therapeutic activities, therapeutic exercises, neuromuscular re-education  "and progress toward the following goals:    Plan of Care Expires:  08/10/23    Subjective     Chief Complaint: Nothing  Patient/Family Comments/goals: "I'm feeling better". "I think we can try standing" re: POC discussion and next session's plan.   Pain/Comfort:  Pain Rating 1: 0/10 ("ache")  Location - Side 1: Bilateral  Location - Orientation 1: lower  Location 1: back  Pain Addressed 1: Distraction, Cessation of Activity, Reposition  Pain Rating Post-Intervention 1: 0/10      Objective:     Communicated with RN prior to session.  Patient found left sidelying with bowel management system, PureWick, pressure relief boots, upon PT entry to room.     General Precautions: Standard, fall, aspiration  Orthopedic Precautions: N/A  Braces: N/A  Respiratory Status: Room air     Cognition:   Attention: focused on task with no cues needed to return attention to tasks  Command Following: follows 100% of 1 step commands with no delay. Some variation in accuracy of motor responses to motor commands during exercise.   Communication: able to communicate needs.     Functional Mobility  Bed Mobility    Rolling Left:  maximal assistance at LE, patient able to manage UE and some trunk movement  Scooting: maximal assistance at hips to reposition EOB  Supine to Sit: maximal assistance at LE and trunk, patient able to initiate movement   Sit to Supine: moderate assistance at LE, patient able to manage trunk to lower down from EOB to sidelying    Balance  Static seated balance: supervision. Patient indp places UE on bed to assist with static balance and displays no lean tendency.   Dynamic seated balance: CGA during EOB exercises. No physical assist needed to maintain balance.     AM-PAC 6 CLICK MOBILITY  Turning over in bed (including adjusting bedclothes, sheets and blankets)?: 2  Sitting down on and standing up from a chair with arms (e.g., wheelchair, bedside commode, etc.): 2  Moving from lying on back to sitting on the side of the " bed?: 2  Moving to and from a bed to a chair (including a wheelchair)?: 1  Need to walk in hospital room?: 1  Climbing 3-5 steps with a railing?: 1  Basic Mobility Total Score: 9       Treatment & Education:  Neuro Re-education  -Seated Balance: UE reaching outside CHETAN while seated EOB, patient alternating reaching to target with UE. LE exercises (knee F/E, ankle DF/PF) while balancing EOB and engaging trunk and UE musculature to maintain upright sitting. *note patient able to achieve greater ROM in LE and demonstrated greater strength than previous sessions, LE edema also decreased.   -Coordination Tasks: cues to isolate specific motions during balance exercises to progress control of LE motions.       Patient left left sidelying with all lines intact and call button in reach. Pressure relief boots donned.     GOALS:   Multidisciplinary Problems       Physical Therapy Goals          Problem: Physical Therapy    Goal Priority Disciplines Outcome Goal Variances Interventions   Physical Therapy Goal     PT, PT/OT Ongoing, Progressing     Description: Goals to be met by: 8/10/23     Patient will increase functional independence with mobility by performin. Supine to sit with Moderate Assistance  2. Rolling to Left  with Minimal Assistance.  3. Sit to stand transfer with Moderate Assistance  4. Bed to chair transfer with Maximum Assistance   5. Sitting at edge of bed x10 minutes with Contact Guard Assistance to perform functional activities.   6. Lower extremity exercise program x10 reps per handout, with assistance as needed to improve strength for functional activities.                          Time Tracking:     PT Received On: 23  PT Start Time: 1459     PT Stop Time: 1517  PT Total Time (min): 18 min     Billable Minutes: Neuromuscular Re-education 18 minutes    Treatment Type: Treatment  PT/PTA: PT     Number of PTA visits since last PT visit: 0     2023

## 2023-07-24 NOTE — PLAN OF CARE
Problem: Infection  Goal: Absence of Infection Signs and Symptoms  Outcome: Ongoing, Progressing     Problem: Adult Inpatient Plan of Care  Goal: Plan of Care Review  Outcome: Ongoing, Progressing  Goal: Patient-Specific Goal (Individualized)  Outcome: Ongoing, Progressing  Goal: Absence of Hospital-Acquired Illness or Injury  Outcome: Ongoing, Progressing  Goal: Optimal Comfort and Wellbeing  Outcome: Ongoing, Progressing  Goal: Readiness for Transition of Care  Outcome: Ongoing, Progressing     Problem: Fluid and Electrolyte Imbalance (Acute Kidney Injury/Impairment)  Goal: Fluid and Electrolyte Balance  Outcome: Ongoing, Progressing     Problem: Oral Intake Inadequate (Acute Kidney Injury/Impairment)  Goal: Optimal Nutrition Intake  Outcome: Ongoing, Progressing     Problem: Renal Function Impairment (Acute Kidney Injury/Impairment)  Goal: Effective Renal Function  Outcome: Ongoing, Progressing     Problem: Impaired Wound Healing  Goal: Optimal Wound Healing  Outcome: Ongoing, Progressing     Problem: Fall Injury Risk  Goal: Absence of Fall and Fall-Related Injury  Outcome: Ongoing, Progressing     Problem: Restraint, Nonbehavioral (Nonviolent)  Goal: Absence of Harm or Injury  Outcome: Ongoing, Progressing     Problem: Nutrition Impairment (Mechanical Ventilation, Invasive)  Goal: Optimal Nutrition Delivery  Outcome: Ongoing, Progressing     Problem: Skin and Tissue Injury (Mechanical Ventilation, Invasive)  Goal: Absence of Device-Related Skin and Tissue Injury  Outcome: Ongoing, Progressing     Problem: Ventilator-Induced Lung Injury (Mechanical Ventilation, Invasive)  Goal: Absence of Ventilator-Induced Lung Injury  Outcome: Ongoing, Progressing     Problem: Communication Impairment (Artificial Airway)  Goal: Effective Communication  Outcome: Ongoing, Progressing     Problem: Device-Related Complication Risk (Artificial Airway)  Goal: Optimal Device Function  Outcome: Ongoing, Progressing     Problem: Skin  and Tissue Injury (Artificial Airway)  Goal: Absence of Device-Related Skin or Tissue Injury  Outcome: Ongoing, Progressing     Problem: Skin Injury Risk Increased  Goal: Skin Health and Integrity  Outcome: Ongoing, Progressing

## 2023-07-24 NOTE — PLAN OF CARE
[facility] - Telemetry Stepdown  Discharge Reassessment    Primary Care Provider: Viktor Ross MD    Expected Discharge Date: 7/25/2023    Reassessment (most recent)       Discharge Reassessment - 07/24/23 1324          Discharge Reassessment    Assessment Type Discharge Planning Reassessment     Did the patient's condition or plan change since previous assessment? No     Discharge Plan discussed with: Sibling     Communicated BRAYAN with patient/caregiver Yes     Discharge Plan A Skilled Nursing Facility     Discharge Plan B Rehab     DME Needed Upon Discharge  none     Transition of Care Barriers Nursing Home rejection     Why the patient remains in the hospital Requires continued medical care;Placement issues        Post-Acute Status    Post-Acute Authorization Placement     Post-Acute Placement Status Pending post-acute provider review/more information requested     Discharge Delays Post-Acute Set-up                 Received a voicemail from [facility] offering a PtP for the LTAC request from [facility]. Per MD, feels SNF level of care is more appropriate. Per [facility], patient is denied.    Spoke with patient's sister, Zaria, and updated. [facility] referral is under review. Attempted to contact Emiliano at [facility] and left voicemail requesting return call. Sent additional referrals in the event the preferred facilities are unable to accept.    4:01 PM  [facility]- denied  [facility]- per Emiliano, no beds available until potentially Friday, however, the referral was still under review and no acceptance/denial decision had been made    Willing to accept per careport: [facility] and [facility]    Reviewing per careport: [facility] and [facility]    Attempted to contact patient's sister and left message updating on above.    4:22 PM  Received return call from patient's sisterZaria, and updated on above. Educated regarding SNF referral process and IMM rights. Zaria  reports she will research the two accepting facilities and inform SW of her decision.    Mirela Cintron, LCSW [facility]  Ext. [phone]

## 2023-07-24 NOTE — PROGRESS NOTES
[facility] - Telemetry Stepdown  Wound Care    Patient Name:  Marely Hamilton   MRN:  [id number]  Date: 7/24/2023  Diagnosis: Gastrointestinal hemorrhage associated with duodenal ulcer    History:     Past Medical History:   Diagnosis Date    Addiction to drug     Alcohol abuse     Alcohol abuse, in remission 6/15/2015    14.5 weeks ago; AA weekly    Anemia     Anxiety 6/15/2015    Behavioral problem     Bipolar disorder     Bipolar disorder in remission 6/15/2015    Cirrhosis, Laennec's 6/15/2015    Depression     Encounter for blood transfusion     Epistaxis 6/15/2015    Fatigue     Glaucoma     Hematuria     Hepatic encephalopathy 6/15/2015    Hepatic enlargement     History of psychiatric care     History of psychiatric hospitalization     History of seizure 6/15/2015    1    hx of intentional Tylenol overdose 6/15/2015    2005- situational and hx of bipolar    Hx of psychiatric care     Macrocytic anemia 9/18/2015    6 units PRBC since June 2015    [facility]     Osteoarthritis     Other ascites 6/15/2015    Psychiatric exam requested by authority     Psychiatric problem     Psychosis 9/26/2019    Renal disorder     Seizures     Self-harming behavior     Suicide attempt     Therapy     Thrombocytopenia 6/15/2015       Social History     Socioeconomic History    Marital status: Single   Occupational History    Occupation: Disabled     Employer: DISABLED   Tobacco Use    Smoking status: Never    Smokeless tobacco: Never   Substance and Sexual Activity    Alcohol use: Not Currently     Comment: hx of ETOH abuse with cirrhosis of liver    Drug use: No    Sexual activity: Not Currently   Other Topics Concern    Patient feels they ought to cut down on drinking/drug use No    Patient annoyed by others criticizing their drinking/drug use No    Patient has felt bad or guilty about drinking/drug use No    Patient has had a drink/used drugs as an eye opener in the AM No   Social History Narrative    Pt has 1 older and 1 younger  sister.  Her father committed suicide when she was 18.  She has a [employer] degree and worked as an , but she has been disabled since age 40.  She never  and has no children.  She is not dating and claims no current friends.  She currently lives with her mother along with her pet dog.  She denies any hobbies and says she is not Anglican.     Social Determinants of Health     Financial Resource Strain: Unknown    Difficulty of Paying Living Expenses: Patient refused   Food Insecurity: Unknown    Worried About Running Out of Food in the Last Year: Patient refused    Ran Out of Food in the Last Year: Patient refused   Transportation Needs: Unknown    Lack of Transportation (Medical): Patient refused    Lack of Transportation (Non-Medical): Patient refused   Physical Activity: Unknown    Days of Exercise per Week: Patient refused    Minutes of Exercise per Session: Patient refused   Stress: Unknown    Feeling of Stress : Patient refused   Social Connections: Unknown    Frequency of Communication with Friends and Family: Patient refused    Frequency of Social Gatherings with Friends and Family: Patient refused    Attends Caodaism Services: Patient refused    Active Member of Clubs or Organizations: Patient refused    Attends Club or Organization Meetings: Patient refused    Marital Status: Patient refused   Housing Stability: Unknown    Unable to Pay for Housing in the Last Year: Patient refused    Number of Places Lived in the Last Year: 1    Unstable Housing in the Last Year: Patient refused       Precautions:     Allergies as of 07/05/2023 - Reviewed 07/05/2023   Allergen Reaction Noted    Sulfa (sulfonamide antibiotics) Rash 11/26/2014    Codeine Nausea And Vomiting 04/26/2018       [facility] Assessment Details/Treatment   Patient seen for sacral wound follow-up.  The sacrum has a healing partial thickness skin loss with a pink and moist wound bed. The periwound is pink and macerated. Triad applied. Patient is  positioned on her left side using a body positioner wedge, is on an immerse mattress and wears heel protecting boots. She is incontinent of urine and has a fecal incontinence .     Recommendations:  - Sacrum, buttocks and perineum: bedside nursing to cleanse with coloplast wipes or soap and water, pat dry and apply triad BID and PRN   - Turning every 2 hours  - Heel protecting boots  - Immerse mattres     07/24/23 1435        Altered Skin Integrity 06/29/23 1523 Sacral spine Moisture associated dermatitis   Date First Assessed/Time First Assessed: 06/29/23 1523   Altered Skin Integrity Present on Admission - Did Patient arrive to the hospital with altered skin?: suspected hospital acquired  Location: Sacral spine  Primary Wound Type: Moisture associated ...   Wound Image    Dressing Appearance Open to air   Drainage Amount Scant   Drainage Characteristics/Odor Serosanguineous   Appearance Pink;Red;Moist   Tissue loss description Partial thickness   Periwound Area Intact;Moist;Excoriated   Care Cleansed with:;Soap and water   Dressing Other (comment)  (TRIAD applied)   Periwound Care Moisture barrier applied         Recommendations made to primary team for above plan via secure chat. Orders placed. Wound care will follow-up as needed.     07/24/2023

## 2023-07-24 NOTE — PLAN OF CARE
Pt AAOx3, no c/o of pain reported. Abdomen is rounded. Rectal tube in place. BLE +4 edema noted, no weeping noted. Pt is on 2L NC. VSS, no acute distress noted. Cont to monitor.    Problem: Adult Inpatient Plan of Care  Goal: Plan of Care Review  Outcome: Ongoing, Progressing  Goal: Absence of Hospital-Acquired Illness or Injury  Outcome: Ongoing, Progressing  Goal: Optimal Comfort and Wellbeing  Outcome: Ongoing, Progressing  Goal: Readiness for Transition of Care  Outcome: Ongoing, Progressing     Problem: Fall Injury Risk  Goal: Absence of Fall and Fall-Related Injury  Outcome: Ongoing, Progressing     Problem: Skin Injury Risk Increased  Goal: Skin Health and Integrity  Outcome: Ongoing, Progressing

## 2023-07-24 NOTE — PROGRESS NOTES
[facility] - Telemetry [facility] Medicine  Progress Note    Patient Name: Marely Hamilton  MRN: [id number]  Patient Class: IP- Inpatient   Admission Date: 7/5/2023  Length of Stay: 19 days  Attending Physician: Jermain Coates MD  Primary Care Provider: Viktor Ross MD        Subjective:     Principal Problem:Gastrointestinal hemorrhage associated with duodenal ulcer  Acute Condition: confusion        HPI:  Marely Hamilton is a 58 y.o. female with history of alcoholic cirrhosis, seizure disorder, DVT and IJ thrombus with recent admission for large retroperitoneal hemorrhage requiring IR embolization and IVC filter placement who presents for hematochezia. Onset this morning at [facility], alida blood per rectum per chart, sent to ED. Initially normotensive but then severe hypotension prompted initiation of levophed and intubation. Hgb 6.8, 2u pRBC given + 1u FFP ordered. Hgb 8.5 on 7/2. On levo and vaso currently. K 6.6 but renal function at baseline. Repeat pending. No prior EGDs on record.          Overview/Hospital Course:    Patient was seen at bedside, hematochezia still present post op by IR. Patient has required many transfusions of pRBCs and platelets due to ongoing down trending of Hgb. No clear source of ongoing hemorrhage by imaging. Patient has continued to require pressors to maintain MAP >65. Discussed with GI and IR regarding active bleeding post op, IR indicated there is not much else to do from their end bc they embolized a significant part of GDA. Pt is off of pressor requirement and not actively bleeding. GI re evaluated pt via EGD and found no sources of active bleeding. Pt is extubated and currently on BIPAP requirement due to de saturation in the 80's. Pt is to be seen by speech and PT/OT. Clear mucinous secretions via suction, chest physiotherapy, and cough assist device. Nebs to help with breathing as well. Pt is off BIPAP and currently on comfort flow. Triple therapy (amoxicillin,  clarithromycin) started for 14 days to treat for positive H pylori serology. Pt also had a NG tube placed so we may begin tube feedings. CxR, EKG, and ABG displayed no reason for tachycardia. Pt becomes anxious when seen by different provider teams. Remove art line and consult for midline placement. Continue to wean comfort flow as tolerated. Ensure pt is working with PT/OT/Speech for continuous improvement. Consult psych regarding patient history of bipolar disorder.         Interval History/Significant Events: Wean comfort flow as tolerated. Ensure patient is working with PT/OT/Speech for continuous improvement. Consult psych regarding psych history     7/16   history of alcoholic cirrhosis, seizure disorder, DVT and IJ thrombus with recent admission for large retroperitoneal hemorrhage requiring IR embolization and IVC filter placement who presents for hematochezia. s/p GI and IR in which they clipped (GI) and embolized (IR) possible sources of duodenal ulcer bleeding.  Hb stable  s/p extubation. sats 92% on 5L of NC.  CX ray - Detrimental changes since the previous examination including interval increase in pulmonary vascularity and bilateral diffuse interstitial pulmonary parenchymal opacification, progression of abnormal opacification at the left lung base, and development of small right-sided pleural effusion.   findings would be consistent with congestive heart failure.PT/OT recs SNF. BNP , procalcitonin and Echo.  SLP recs NPO .  On NGT feeds. psychiatry following for bipolar disorders.  Recs Zyprexa 5 mg QHS . Ammonia 48 WNL. AAOX 1. Hepatology consulted for cirrhosis. UA ordered   7/17 Saldaña removed. UA +. UC pending. started on IV ceftriaxone. ammonia at 59. on B/L UE mittens as pulled of NGT. Hepatology eval. resumed lactulose . discussed with sister and updated clinical status   7/18  MBS today - started puree nectar thick liquids.  on oxygen 3L via NC.   wean  as tolerated . UC -YEAST 10,000 - 49,999  cfu/ml Identification pending No other significant isolate. hydroxyzine  PRN discontinued  due to it not being safe in delirium. oriented to person, hospital and year   7/19 SLP recs - tolerating Puree Diet - IDDSI Level 4, Mildly thick/Nectar thick liquids - IDDSI Level 2 .stool output 600ml/ monitor on lactulose . improved mentation AAOX 3      Interval History: Ready for dc to SNF, MS is limited but at baseline.    Review of Systems  Objective:     Vital Signs (Most Recent):  Temp: (!) 100.4 °F (38 °C) (07/24/23 0730)  Pulse: 91 (07/24/23 0843)  Resp: (!) 22 (07/24/23 0843)  BP: (!) 117/56 (07/24/23 0730)  SpO2: 98 % (07/24/23 0843) Vital Signs (24h Range):  Temp:  [98.2 °F (36.8 °C)-100.4 °F (38 °C)] 100.4 °F (38 °C)  Pulse:  [86-98] 91  Resp:  [16-22] 22  SpO2:  [92 %-98 %] 98 %  BP: (116-158)/(56-70) 117/56     Weight: 61.7 kg (136 lb 0.4 oz)  Body mass index is 24.88 kg/m².    Intake/Output Summary (Last 24 hours) at 7/24/2023 0938  Last data filed at 7/24/2023 0906  Gross per 24 hour   Intake 270 ml   Output 1300 ml   Net -1030 ml         Physical Exam  Constitutional:       Appearance: She is ill-appearing and toxic-appearing.   HENT:      Head: Normocephalic.   Eyes:      General: Scleral icterus present.   Neck:      Thyroid: No thyroid mass or thyroid tenderness.      Vascular: No carotid bruit or hepatojugular reflux.   Cardiovascular:      Rate and Rhythm: Normal rate.      Pulses: Decreased pulses.      Heart sounds: Heart sounds are distant.   Chest:      Chest wall: No mass, lacerations or deformity.   Breasts:     Right: No swelling.      Left: No swelling.   Abdominal:      General: Abdomen is flat.      Palpations: Abdomen is rigid.      Hernia: No hernia is present.   Genitourinary:     Vagina: Normal.   Musculoskeletal:      Cervical back: Rigidity present.   Lymphadenopathy:      Cervical: No cervical adenopathy.   Skin:     Coloration: Skin is jaundiced.      Findings: Ecchymosis present.    Neurological:      Mental Status: She is unresponsive.           Significant Labs: All pertinent labs within the past 24 hours have been reviewed.  BMP:   Recent Labs   Lab 07/24/23  0513   GLU 89      K 3.8   *   CO2 19*   BUN 6   CREATININE 0.3*   CALCIUM 7.7*   MG 1.6       Significant Imaging: I have reviewed all pertinent imaging results/findings within the past 24 hours.      Assessment/Plan:      * Gastrointestinal hemorrhage associated with duodenal ulcer    as  above        Dysphagia  7/18  MBS today -started puree nectar thick liquids.        Hypoxia   7/16 sats 92% on 5L of NC.  CX ray - Detrimental changes since the previous examination including interval increase in pulmonary vascularity and bilateral diffuse interstitial pulmonary parenchymal opacification, progression of abnormal opacification at the left lung base, and development of small right-sided pleural effusion.   findings would be consistent with congestive heart failure.PT/OT recs SNF. BNP , procalcitonin and Echo.     H. pylori infection    positive serology for H pylori, begin triple therapy with clarithromycin,amoxicillin, and PPI for 14 days       Hematochezia    - GI EGD clipped duodenal ulcers for IR reference  - Transfuse blood products for active bleeding   - trend CBC and follow  - IR embolized GDA for duodenal hyperemia   -- GI re evaluated site of duodenal ulcers, no noted active bleeding  -- Continue PPI BID for 8 weeks, then once daily after that  --Pt had positive serology for H pylori, begin triple therapy with clarithromycin,amoxicillin, and PPI for 14 days      Acute blood loss anemia     - Maintain IV access with 2 large bore Ivs  - Intravascular resuscitation/support with IVFs   - Hold all NSAIDs and anticoagulants, unless contraindicated.  - Please correct any coagulopathy with platelets and FFP for goal of platelets >50K and INR <2.0  - Please notify GI team if there is significant change in patient's  clinical status  - To date, patient has required at least 21 units of pRBCs to maintain Hgb >7     7/16     Patient's with macrocytic anemia.. Hemoglobin stable. Etiology likely due to acute blood loss.  Current CBC reviewed-    Recent Labs   Lab 07/14/23  0752 07/15/23  0350 07/16/23  0825   HGB 8.6* 8.1* 8.0*         Component Value Date/Time     (H) 07/16/2023 0825    RDW 24.7 (H) 07/16/2023 0825    IRON 132 05/18/2023 1527    FERRITIN 110 05/18/2023 1527    RETIC 3.9 (H) 06/05/2023 0935    FOLATE 16.2 05/31/2023 1250    OCJWUGIQ98 >2000 (H) 05/31/2023 1250    OCCULTBLOOD Negative 09/19/2015 0137     Monitor CBC and transfuse if H/H drops below 7/21.        Retroperitoneal bleed  Retroperitoneum: No significant adenopathy.  Similar sized left retroperitoneal hematoma measuring 11 x 9 cm (series 5, image 100; previously 11 x 9 cm).  The left iliacus collection also measures similar to prior at 5.4 cm (series 5, image 127; previously 5.6 cm).  Layering hyperdensity within these collections suggesting different ages in blood products.  No significant volume active extravasation into these collections  - CT-A demonstrated stable retroperitoneal hematoma. No need for intervention at this time.          Supratherapeutic INR  patient with INR   Recent Labs   Lab 07/17/23  0619 07/18/23  0525 07/19/23  0350   INR 1.9* 1.7* 1.6*   . INR is supratherapeutic. secondary to coagulopathy from liver disease.monitor      UTI (urinary tract infection)  7/17 Saldaña removed. UA +. UC pending. started on IV ceftriaxone.  7/18 UC -YEAST 10,000 - 49,999 cfu/ml Identification pending No other significant isolate    Bipolar 1 disorder     - Consult psych regarding Bipolar 1 disorder history       Hepatic encephalopathy   ammonia 190s on admit -->90s . AAOX 1. no lactulose give due to GI bleed  repeat ammonia. Hepatolog eval  with     MELD 3.0: 21 at 7/17/2023  6:19 AM  MELD-Na: 18 at 7/17/2023  6:19 AM  Calculated from:  Serum  Creatinine: 0.4 mg/dL (Using min of 1 mg/dL) at 7/17/2023  6:19 AM  Serum Sodium: 143 mmol/L (Using max of 137 mmol/L) at 7/17/2023  6:19 AM  Total Bilirubin: 3.0 mg/dL at 7/17/2023  6:19 AM  Serum Albumin: 2.2 g/dL at 7/17/2023  6:19 AM  INR(ratio): 1.9 at 7/17/2023  6:19 AM  Age at listing (hypothetical): 58 years  Sex: Female at 7/17/2023  6:19 AM  7/16 Ammonia 48 WNL. AAOX 1. Hepatology consulted for cirrhosis.    7/17  Hepatology eval. resumed lactulose.  7/18  hydroxyzine  PRN  discontinued  due to it not being safe in delirium    Severe protein-calorie malnutrition  Nutrition consulted. Most recent weight and BMI monitored-     Measurements:  Wt Readings from Last 1 Encounters:   07/16/23 62.7 kg (138 lb 4.4 oz)   Body mass index is 25.29 kg/m².    Patient has been screened and assessed by RD.    Malnutrition Type:  Context: social/environmental circumstances  Level: severe    Malnutrition Characteristic Summary:  Energy Intake (Malnutrition): less than or equal to 75% for greater than or equal to 1 month  Subcutaneous Fat (Malnutrition): severe depletion  Muscle Mass (Malnutrition): severe depletion      History of seizure  on Keppra via NGT       Thrombocytopenia  with cirrhosis   Patient with thrombocytopenia   Recent Labs   Lab 07/17/23  0619 07/18/23  0525 07/19/23  0350   PLT 34* 32* 34*   . Platelet counts stable  .monitor      Cirrhosis, Laennec's  alcoholic cirrhosis  MELD 3.0: 20 at 7/16/2023  4:51 AM  MELD-Na: 17 at 7/16/2023  4:51 AM  Calculated from:  Serum Creatinine: 0.4 mg/dL (Using min of 1 mg/dL) at 7/16/2023  4:51 AM  Serum Sodium: 145 mmol/L (Using max of 137 mmol/L) at 7/16/2023  4:51 AM  Total Bilirubin: 3.0 mg/dL at 7/16/2023  4:51 AM  Serum Albumin: 2.3 g/dL at 7/16/2023  4:51 AM  INR(ratio): 1.8 at 7/15/2023  3:50 AM  Age at listing (hypothetical): 58 years  Sex: Female at 7/16/2023  4:51 AM     Recent Labs   Lab 07/16/23  1201   AMMONIA 48     Strict input /output monitor, daily  weights        VTE Risk Mitigation (From admission, onward)         Ordered     Place sequential compression device  Until discontinued         07/14/23 1024     Reason for No Pharmacological VTE Prophylaxis  Once        Question:  Reasons:  Answer:  Active Bleeding    07/05/23 1334     IP VTE HIGH RISK PATIENT  Once         07/05/23 1334                Discharge Planning   BRAYAN: 7/25/2023     Code Status: DNR   Is the patient medically ready for discharge?: No    Reason for patient still in hospital (select all that apply): Patient unstable  Discharge Plan A: Skilled Nursing Facility   Discharge Delays: None known at this time              Jermain Coates MD  Department of Hospital Medicine   [facility] - Telemetry Stepdown

## 2023-07-25 ENCOUNTER — DOCUMENT SCAN (OUTPATIENT)
Dept: HOME HEALTH SERVICES | Facility: HOSPITAL | Age: 58
End: 2023-07-25
Payer: MEDICARE

## 2023-07-25 LAB
ALBUMIN SERPL BCP-MCNC: 1.8 G/DL (ref 3.5–5.2)
ALP SERPL-CCNC: 86 U/L (ref 55–135)
ALT SERPL W/O P-5'-P-CCNC: 11 U/L (ref 10–44)
ANION GAP SERPL CALC-SCNC: 8 MMOL/L (ref 8–16)
APTT PPP: 34.7 SEC (ref 21–32)
AST SERPL-CCNC: 27 U/L (ref 10–40)
BASOPHILS # BLD AUTO: 0.01 K/UL (ref 0–0.2)
BASOPHILS NFR BLD: 0.4 % (ref 0–1.9)
BILIRUB SERPL-MCNC: 3.1 MG/DL (ref 0.1–1)
BUN SERPL-MCNC: 5 MG/DL (ref 6–20)
CA-I BLDV-SCNC: 1.02 MMOL/L (ref 1.06–1.42)
CA-I BLDV-SCNC: 1.09 MMOL/L (ref 1.06–1.42)
CALCIUM SERPL-MCNC: 7.6 MG/DL (ref 8.7–10.5)
CHLORIDE SERPL-SCNC: 118 MMOL/L (ref 95–110)
CO2 SERPL-SCNC: 17 MMOL/L (ref 23–29)
CREAT SERPL-MCNC: 0.3 MG/DL (ref 0.5–1.4)
DIFFERENTIAL METHOD: ABNORMAL
EOSINOPHIL # BLD AUTO: 0.1 K/UL (ref 0–0.5)
EOSINOPHIL NFR BLD: 2.5 % (ref 0–8)
ERYTHROCYTE [DISTWIDTH] IN BLOOD BY AUTOMATED COUNT: 25.7 % (ref 11.5–14.5)
EST. GFR  (NO RACE VARIABLE): >60 ML/MIN/1.73 M^2
GLUCOSE SERPL-MCNC: 100 MG/DL (ref 70–110)
HCT VFR BLD AUTO: 24.4 % (ref 37–48.5)
HGB BLD-MCNC: 7.6 G/DL (ref 12–16)
IMM GRANULOCYTES # BLD AUTO: 0 K/UL (ref 0–0.04)
IMM GRANULOCYTES NFR BLD AUTO: 0 % (ref 0–0.5)
LYMPHOCYTES # BLD AUTO: 0.5 K/UL (ref 1–4.8)
LYMPHOCYTES NFR BLD: 18.1 % (ref 18–48)
MAGNESIUM SERPL-MCNC: 1.6 MG/DL (ref 1.6–2.6)
MCH RBC QN AUTO: 32.6 PG (ref 27–31)
MCHC RBC AUTO-ENTMCNC: 31.1 G/DL (ref 32–36)
MCV RBC AUTO: 105 FL (ref 82–98)
MONOCYTES # BLD AUTO: 0.3 K/UL (ref 0.3–1)
MONOCYTES NFR BLD: 9.4 % (ref 4–15)
NEUTROPHILS # BLD AUTO: 1.9 K/UL (ref 1.8–7.7)
NEUTROPHILS NFR BLD: 69.6 % (ref 38–73)
NRBC BLD-RTO: 0 /100 WBC
PLATELET # BLD AUTO: 46 K/UL (ref 150–450)
PMV BLD AUTO: 10 FL (ref 9.2–12.9)
POCT GLUCOSE: 100 MG/DL (ref 70–110)
POCT GLUCOSE: 104 MG/DL (ref 70–110)
POCT GLUCOSE: 121 MG/DL (ref 70–110)
POCT GLUCOSE: 133 MG/DL (ref 70–110)
POCT GLUCOSE: 98 MG/DL (ref 70–110)
POTASSIUM SERPL-SCNC: 3.5 MMOL/L (ref 3.5–5.1)
PROT SERPL-MCNC: 4.4 G/DL (ref 6–8.4)
RBC # BLD AUTO: 2.33 M/UL (ref 4–5.4)
SODIUM SERPL-SCNC: 143 MMOL/L (ref 136–145)
WBC # BLD AUTO: 2.76 K/UL (ref 3.9–12.7)

## 2023-07-25 PROCEDURE — 97110 THERAPEUTIC EXERCISES: CPT

## 2023-07-25 PROCEDURE — 36415 COLL VENOUS BLD VENIPUNCTURE: CPT

## 2023-07-25 PROCEDURE — 80053 COMPREHEN METABOLIC PANEL: CPT

## 2023-07-25 PROCEDURE — 99232 SBSQ HOSP IP/OBS MODERATE 35: CPT | Mod: ,,, | Performed by: INTERNAL MEDICINE

## 2023-07-25 PROCEDURE — 85730 THROMBOPLASTIN TIME PARTIAL: CPT

## 2023-07-25 PROCEDURE — 97535 SELF CARE MNGMENT TRAINING: CPT

## 2023-07-25 PROCEDURE — 99900035 HC TECH TIME PER 15 MIN (STAT)

## 2023-07-25 PROCEDURE — 99232 PR SUBSEQUENT HOSPITAL CARE,LEVL II: ICD-10-PCS | Mod: ,,, | Performed by: INTERNAL MEDICINE

## 2023-07-25 PROCEDURE — 63600175 PHARM REV CODE 636 W HCPCS: Performed by: STUDENT IN AN ORGANIZED HEALTH CARE EDUCATION/TRAINING PROGRAM

## 2023-07-25 PROCEDURE — 97530 THERAPEUTIC ACTIVITIES: CPT

## 2023-07-25 PROCEDURE — 25000003 PHARM REV CODE 250: Performed by: HOSPITALIST

## 2023-07-25 PROCEDURE — 83735 ASSAY OF MAGNESIUM: CPT

## 2023-07-25 PROCEDURE — 85025 COMPLETE CBC W/AUTO DIFF WBC: CPT

## 2023-07-25 PROCEDURE — 94668 MNPJ CHEST WALL SBSQ: CPT

## 2023-07-25 PROCEDURE — 20600001 HC STEP DOWN PRIVATE ROOM

## 2023-07-25 PROCEDURE — 25000003 PHARM REV CODE 250

## 2023-07-25 PROCEDURE — 82330 ASSAY OF CALCIUM: CPT | Mod: 91

## 2023-07-25 PROCEDURE — 25000242 PHARM REV CODE 250 ALT 637 W/ HCPCS: Performed by: HOSPITALIST

## 2023-07-25 PROCEDURE — 94640 AIRWAY INHALATION TREATMENT: CPT

## 2023-07-25 PROCEDURE — C9113 INJ PANTOPRAZOLE SODIUM, VIA: HCPCS | Performed by: STUDENT IN AN ORGANIZED HEALTH CARE EDUCATION/TRAINING PROGRAM

## 2023-07-25 PROCEDURE — 94761 N-INVAS EAR/PLS OXIMETRY MLT: CPT

## 2023-07-25 RX ADMIN — AMOXICILLIN 1000 MG: 500 CAPSULE ORAL at 08:07

## 2023-07-25 RX ADMIN — PANTOPRAZOLE SODIUM 40 MG: 40 INJECTION, POWDER, FOR SOLUTION INTRAVENOUS at 08:07

## 2023-07-25 RX ADMIN — ACETYLCYSTEINE 2 ML: 200 SOLUTION ORAL; RESPIRATORY (INHALATION) at 01:07

## 2023-07-25 RX ADMIN — LACTULOSE 10 G: 20 SOLUTION ORAL at 08:07

## 2023-07-25 RX ADMIN — LEVETIRACETAM 1000 MG: 500 SOLUTION ORAL at 08:07

## 2023-07-25 RX ADMIN — IPRATROPIUM BROMIDE AND ALBUTEROL SULFATE 3 ML: 2.5; .5 SOLUTION RESPIRATORY (INHALATION) at 08:07

## 2023-07-25 RX ADMIN — ACETYLCYSTEINE 2 ML: 200 SOLUTION ORAL; RESPIRATORY (INHALATION) at 08:07

## 2023-07-25 RX ADMIN — IPRATROPIUM BROMIDE AND ALBUTEROL SULFATE 3 ML: 2.5; .5 SOLUTION RESPIRATORY (INHALATION) at 01:07

## 2023-07-25 RX ADMIN — OLANZAPINE 5 MG: 5 TABLET, FILM COATED ORAL at 08:07

## 2023-07-25 RX ADMIN — CLARITHROMYCIN 500 MG: 500 TABLET, FILM COATED ORAL at 08:07

## 2023-07-25 RX ADMIN — LACTULOSE 10 G: 20 SOLUTION ORAL at 03:07

## 2023-07-25 NOTE — PLAN OF CARE
Problem: Infection  Goal: Absence of Infection Signs and Symptoms  Outcome: Ongoing, Progressing     Problem: Adult Inpatient Plan of Care  Goal: Plan of Care Review  Outcome: Ongoing, Progressing  Goal: Patient-Specific Goal (Individualized)  Outcome: Ongoing, Progressing  Goal: Absence of Hospital-Acquired Illness or Injury  Outcome: Ongoing, Progressing  Goal: Optimal Comfort and Wellbeing  Outcome: Ongoing, Progressing  Goal: Readiness for Transition of Care  Outcome: Ongoing, Progressing     Problem: Fluid and Electrolyte Imbalance (Acute Kidney Injury/Impairment)  Goal: Fluid and Electrolyte Balance  Outcome: Ongoing, Progressing   Pt. Sitting up in bed resp even and unlabored no distress noted denies pain at this time pt. Is progressing well safety precautions maintained.

## 2023-07-25 NOTE — PROGRESS NOTES
Jimmy Phillpi - Telemetry [facility] Medicine  Progress Note    Patient Name: Marely Hamilton  MRN: [id number]  Patient Class: IP- Inpatient   Admission Date: 7/5/2023  Length of Stay: 20 days  Attending Physician: Jermain Coates MD  Primary Care Provider: Viktor Ross MD        Subjective:     Principal Problem:Gastrointestinal hemorrhage associated with duodenal ulcer  Acute Condition: Elevated temp.        HPI:  Marely Hamilton is a 58 y.o. female with history of alcoholic cirrhosis, seizure disorder, DVT and IJ thrombus with recent admission for large retroperitoneal hemorrhage requiring IR embolization and IVC filter placement who presents for hematochezia. Onset this morning at [facility], alida blood per rectum per chart, sent to ED. Initially normotensive but then severe hypotension prompted initiation of levophed and intubation. Hgb 6.8, 2u pRBC given + 1u FFP ordered. Hgb 8.5 on 7/2. On levo and vaso currently. K 6.6 but renal function at baseline. Repeat pending. No prior EGDs on record.          Overview/Hospital Course:    Patient was seen at bedside, hematochezia still present post op by IR. Patient has required many transfusions of pRBCs and platelets due to ongoing down trending of Hgb. No clear source of ongoing hemorrhage by imaging. Patient has continued to require pressors to maintain MAP >65. Discussed with GI and IR regarding active bleeding post op, IR indicated there is not much else to do from their end bc they embolized a significant part of GDA. Pt is off of pressor requirement and not actively bleeding. GI re evaluated pt via EGD and found no sources of active bleeding. Pt is extubated and currently on BIPAP requirement due to de saturation in the 80's. Pt is to be seen by speech and PT/OT. Clear mucinous secretions via suction, chest physiotherapy, and cough assist device. Nebs to help with breathing as well. Pt is off BIPAP and currently on comfort flow. Triple therapy (amoxicillin,  clarithromycin) started for 14 days to treat for positive H pylori serology. Pt also had a NG tube placed so we may begin tube feedings. CxR, EKG, and ABG displayed no reason for tachycardia. Pt becomes anxious when seen by different provider teams. Remove art line and consult for midline placement. Continue to wean comfort flow as tolerated. Ensure pt is working with PT/OT/Speech for continuous improvement. Consult psych regarding patient history of bipolar disorder.         Interval History/Significant Events: Wean comfort flow as tolerated. Ensure patient is working with PT/OT/Speech for continuous improvement. Consult psych regarding psych history     7/16   history of alcoholic cirrhosis, seizure disorder, DVT and IJ thrombus with recent admission for large retroperitoneal hemorrhage requiring IR embolization and IVC filter placement who presents for hematochezia. s/p GI and IR in which they clipped (GI) and embolized (IR) possible sources of duodenal ulcer bleeding.  Hb stable  s/p extubation. sats 92% on 5L of NC.  CX ray - Detrimental changes since the previous examination including interval increase in pulmonary vascularity and bilateral diffuse interstitial pulmonary parenchymal opacification, progression of abnormal opacification at the left lung base, and development of small right-sided pleural effusion.   findings would be consistent with congestive heart failure.PT/OT recs SNF. BNP , procalcitonin and Echo.  SLP recs NPO .  On NGT feeds. psychiatry following for bipolar disorders.  Recs Zyprexa 5 mg QHS . Ammonia 48 WNL. AAOX 1. Hepatology consulted for cirrhosis. UA ordered   7/17 Saldaña removed. UA +. UC pending. started on IV ceftriaxone. ammonia at 59. on B/L UE mittens as pulled of NGT. Hepatology eval. resumed lactulose . discussed with sister and updated clinical status   7/18  MBS today - started puree nectar thick liquids.  on oxygen 3L via NC.   wean  as tolerated . UC -YEAST 10,000 - 49,999  cfu/ml Identification pending No other significant isolate. hydroxyzine  PRN discontinued  due to it not being safe in delirium. oriented to person, hospital and year   7/19 SLP recs - tolerating Puree Diet - IDDSI Level 4, Mildly thick/Nectar thick liquids - IDDSI Level 2 .stool output 600ml/ monitor on lactulose . improved mentation AAOX 3      Interval History: C/W low grade temp, 100.4, rectal tube removed, tract seen, will discuss with wound care. I have discussed prognosis with sister in [place], [place]. \Hospital Has a New Name and Outlook.\"", Check CXR today      Review of Systems  Objective:     Vital Signs (Most Recent):  Temp: 99 °F (37.2 °C) (07/25/23 0751)  Pulse: 86 (07/25/23 0806)  Resp: 18 (07/25/23 0806)  BP: 118/62 (07/25/23 0751)  SpO2: (!) 92 % (07/25/23 0806) Vital Signs (24h Range):  Temp:  [98.4 °F (36.9 °C)-100.4 °F (38 °C)] 99 °F (37.2 °C)  Pulse:  [] 86  Resp:  [16-21] 18  SpO2:  [89 %-100 %] 92 %  BP: (118-134)/(58-65) 118/62     Weight: 61.7 kg (136 lb 0.4 oz)  Body mass index is 24.88 kg/m².    Intake/Output Summary (Last 24 hours) at 7/25/2023 0922  Last data filed at 7/24/2023 1432  Gross per 24 hour   Intake 240 ml   Output 350 ml   Net -110 ml         Physical Exam  Constitutional:       Appearance: She is ill-appearing and toxic-appearing.   HENT:      Head: Normocephalic.   Eyes:      General: Scleral icterus present.   Neck:      Thyroid: No thyroid mass or thyroid tenderness.      Vascular: No carotid bruit or hepatojugular reflux.   Cardiovascular:      Rate and Rhythm: Normal rate.      Pulses: Decreased pulses.      Heart sounds: Heart sounds are distant.   Chest:      Chest wall: No mass, lacerations or deformity.   Breasts:     Right: No swelling.      Left: No swelling.   Abdominal:      General: Abdomen is flat.      Palpations: Abdomen is rigid.      Hernia: No hernia is present.   Genitourinary:     Vagina: Normal.   Musculoskeletal:      Cervical back: Rigidity present.   Lymphadenopathy:      Cervical:  No cervical adenopathy.   Skin:     Coloration: Skin is jaundiced.      Findings: Ecchymosis present.   Neurological:      Mental Status: She is unresponsive.           Significant Labs: All pertinent labs within the past 24 hours have been reviewed.  CMP:   Recent Labs   Lab 07/24/23  0513 07/25/23  0248    143   K 3.8 3.5   * 118*   CO2 19* 17*   GLU 89 100   BUN 6 5*   CREATININE 0.3* 0.3*   CALCIUM 7.7* 7.6*   PROT 4.9* 4.4*   ALBUMIN 2.0* 1.8*   BILITOT 3.2* 3.1*   ALKPHOS 100 86   AST 31 27   ALT 14 11   ANIONGAP 4* 8       Significant Imaging: I have reviewed all pertinent imaging results/findings within the past 24 hours.      Assessment/Plan:      * Gastrointestinal hemorrhage associated with duodenal ulcer    as  above        Dysphagia  7/18  MBS today -started puree nectar thick liquids.        Hypoxia   7/16 sats 92% on 5L of NC.  CX ray - Detrimental changes since the previous examination including interval increase in pulmonary vascularity and bilateral diffuse interstitial pulmonary parenchymal opacification, progression of abnormal opacification at the left lung base, and development of small right-sided pleural effusion.   findings would be consistent with congestive heart failure.PT/OT recs SNF. BNP , procalcitonin and Echo.     H. pylori infection    positive serology for H pylori, begin triple therapy with clarithromycin,amoxicillin, and PPI for 14 days       Hematochezia    - GI EGD clipped duodenal ulcers for IR reference  - Transfuse blood products for active bleeding   - trend CBC and follow  - IR embolized GDA for duodenal hyperemia   -- GI re evaluated site of duodenal ulcers, no noted active bleeding  -- Continue PPI BID for 8 weeks, then once daily after that  --Pt had positive serology for H pylori, begin triple therapy with clarithromycin,amoxicillin, and PPI for 14 days      Acute blood loss anemia     - Maintain IV access with 2 large bore Ivs  - Intravascular  resuscitation/support with IVFs   - Hold all NSAIDs and anticoagulants, unless contraindicated.  - Please correct any coagulopathy with platelets and FFP for goal of platelets >50K and INR <2.0  - Please notify GI team if there is significant change in patient's clinical status  - To date, patient has required at least 21 units of pRBCs to maintain Hgb >7     7/16     Patient's with macrocytic anemia.. Hemoglobin stable. Etiology likely due to acute blood loss.  Current CBC reviewed-    Recent Labs   Lab 07/14/23  0752 07/15/23  0350 07/16/23  0825   HGB 8.6* 8.1* 8.0*         Component Value Date/Time     (H) 07/16/2023 0825    RDW 24.7 (H) 07/16/2023 0825    IRON 132 05/18/2023 1527    FERRITIN 110 05/18/2023 1527    RETIC 3.9 (H) 06/05/2023 0935    FOLATE 16.2 05/31/2023 1250    UYAVXDVN26 >2000 (H) 05/31/2023 1250    OCCULTBLOOD Negative 09/19/2015 0137     Monitor CBC and transfuse if H/H drops below 7/21.        Retroperitoneal bleed  Retroperitoneum: No significant adenopathy.  Similar sized left retroperitoneal hematoma measuring 11 x 9 cm (series 5, image 100; previously 11 x 9 cm).  The left iliacus collection also measures similar to prior at 5.4 cm (series 5, image 127; previously 5.6 cm).  Layering hyperdensity within these collections suggesting different ages in blood products.  No significant volume active extravasation into these collections  - CT-A demonstrated stable retroperitoneal hematoma. No need for intervention at this time.          Supratherapeutic INR  patient with INR   Recent Labs   Lab 07/17/23  0619 07/18/23  0525 07/19/23  0350   INR 1.9* 1.7* 1.6*   . INR is supratherapeutic. secondary to coagulopathy from liver disease.monitor      UTI (urinary tract infection)  7/17 Saldaña removed. UA +. UC pending. started on IV ceftriaxone.  7/18 UC -YEAST 10,000 - 49,999 cfu/ml Identification pending No other significant isolate    Bipolar 1 disorder     - Consult psych regarding Bipolar  1 disorder history       Hepatic encephalopathy   ammonia 190s on admit -->90s . AAOX 1. no lactulose give due to GI bleed  repeat ammonia. Hepatolog eval  with     MELD 3.0: 21 at 7/17/2023  6:19 AM  MELD-Na: 18 at 7/17/2023  6:19 AM  Calculated from:  Serum Creatinine: 0.4 mg/dL (Using min of 1 mg/dL) at 7/17/2023  6:19 AM  Serum Sodium: 143 mmol/L (Using max of 137 mmol/L) at 7/17/2023  6:19 AM  Total Bilirubin: 3.0 mg/dL at 7/17/2023  6:19 AM  Serum Albumin: 2.2 g/dL at 7/17/2023  6:19 AM  INR(ratio): 1.9 at 7/17/2023  6:19 AM  Age at listing (hypothetical): 58 years  Sex: Female at 7/17/2023  6:19 AM  7/16 Ammonia 48 WNL. AAOX 1. Hepatology consulted for cirrhosis.    7/17  Hepatology eval. resumed lactulose.  7/18  hydroxyzine  PRN  discontinued  due to it not being safe in delirium    Severe protein-calorie malnutrition  Nutrition consulted. Most recent weight and BMI monitored-     Measurements:  Wt Readings from Last 1 Encounters:   07/16/23 62.7 kg (138 lb 4.4 oz)   Body mass index is 25.29 kg/m².    Patient has been screened and assessed by RD.    Malnutrition Type:  Context: social/environmental circumstances  Level: severe    Malnutrition Characteristic Summary:  Energy Intake (Malnutrition): less than or equal to 75% for greater than or equal to 1 month  Subcutaneous Fat (Malnutrition): severe depletion  Muscle Mass (Malnutrition): severe depletion      History of seizure  on Keppra via NGT       Thrombocytopenia  with cirrhosis   Patient with thrombocytopenia   Recent Labs   Lab 07/17/23  0619 07/18/23  0525 07/19/23  0350   PLT 34* 32* 34*   . Platelet counts stable  .monitor      Cirrhosis, Laennec's  alcoholic cirrhosis  MELD 3.0: 20 at 7/16/2023  4:51 AM  MELD-Na: 17 at 7/16/2023  4:51 AM  Calculated from:  Serum Creatinine: 0.4 mg/dL (Using min of 1 mg/dL) at 7/16/2023  4:51 AM  Serum Sodium: 145 mmol/L (Using max of 137 mmol/L) at 7/16/2023  4:51 AM  Total Bilirubin: 3.0 mg/dL at 7/16/2023  4:51  AM  Serum Albumin: 2.3 g/dL at 7/16/2023  4:51 AM  INR(ratio): 1.8 at 7/15/2023  3:50 AM  Age at listing (hypothetical): 58 years  Sex: Female at 7/16/2023  4:51 AM     Recent Labs   Lab 07/16/23  1201   AMMONIA 48     Strict input /output monitor, daily weights        VTE Risk Mitigation (From admission, onward)         Ordered     Place sequential compression device  Until discontinued         07/14/23 1024     Reason for No Pharmacological VTE Prophylaxis  Once        Question:  Reasons:  Answer:  Active Bleeding    07/05/23 1334     IP VTE HIGH RISK PATIENT  Once         07/05/23 1334                Discharge Planning   BRAYAN: 7/25/2023     Code Status: DNR   Is the patient medically ready for discharge?: No    Reason for patient still in hospital (select all that apply): Patient unstable  Discharge Plan A: Skilled Nursing Facility   Discharge Delays: (!) Post-Acute Set-up              Jermain Cotaes MD  Department of Hospital Medicine   [facility] - [facility]

## 2023-07-25 NOTE — PLAN OF CARE
Attempted to contact Rere in admissions at [facility] and left voicemail informing of sister's choice. Also requested for [facility] to submit for insurance auth as patient is medically ready for dc.    [facility]- spoke with Jing in admissions who states she did not receive the referral fax, but also states they accept [facility]Critical access hospital hospital patients before accepting any outside referrals. Re-sent referral fax and informed Sonja they are one of family's preferred facilities.    [facility]- spoke with Salvador in admissions who reports unable to access careport referral. Faxed referral and requested for Brenton to review ASAP as [facility] is one of the family's preferred facilities.     [facility]- left voicemail for Rosalba in admissions requesting referral status update and informing that they are one of the family's preferred facilities and requested a decision on acceptance or denial ASAP.    [facility]- left voicemail for Carlie in admissions requesting referral status update and informing that they are one of the family's preferred facilities and requested a decision on acceptance or denial ASAP.    Met with patient at bedside, updated on referral status including the accepting facilities ([facility] and [facility]) and informed [facility] (where patient admitted from) has denied. Informed of sister's preference of [facility] out of the 2 current accepting facilities and that if another preferred facility accepts prior to auth approval/dc, the auth can be switched if patient/family prefers. Patient verbalized understanding    9:56 AM  Received message from Rere at [facility] advising for SW to reach out to Ambreen in admissions today. Attempted to contact Ambreen and left voicemail requesting return call.    10:10 AM  Spoke with Rosalba in admissions at [facility] who reports they will review the referral.    Per Ambreen in admissions at [facility], she will reach out to patient's sister  shortly to discuss admission process and will work on submitting for insurance auth.    2:00 PM  Received voicemail from patient's sister, Zaria, who reports she spoke with Ambreen at [facility] and has received the admission paperwork. Zaria reports she does have some questions regarding the paperwork, but she is in touch with Ambreen who will call her back soon with more information.    Received call from Ambreen at [facility] who also reported the above information.    Attempted to contact Brenton at [facility] to check the status of the referral and left voicemail requesting return call.    Spoke with Jing at [facility] and sent email. Jing reports she will locate the referral and return call to  with a status update.    Attempted to contact Rosalba in admissions at [facility] and left voicemail requesting return call.    Attempted to contact Carlie in admissions at [facility] and left voicemail requesting return call.    3:48 PM  Spoke with Rosalba who reports patient is denied.    Received notification from Jing at [facility] who reports she is reviewing the referral but [facility] gets first priority for placements.    Per Ambreen at [facility], admission paperwork is almost completed.    4:38 PM  Updated patient's sister, Zaria, that the preferred facilities have not accepted at this time. Zaria reports she is working on the admission paperwork for [facility] and understands we have to move forward with the accepting facility.      Patient has been denied by the following SNFs in network with insurance:  [facility]  [facility] (3:47 PM)    Mirela Cintron LCSW  [facility]  Ext. [phone]

## 2023-07-25 NOTE — NURSING
Nurse attempted several times to perform in and out cath on pt  she needs a u/a she is very combative she states do not touch me I am pregnant nurse redirected several times unsuccessful md notified.

## 2023-07-25 NOTE — SUBJECTIVE & OBJECTIVE
Interval History: C/W low grade temp, 100.4, rectal tube removed, tract seen, will discuss with wound care. I have discussed prognosis with sister in [facility]., Check CXR today      Review of Systems  Objective:     Vital Signs (Most Recent):  Temp: 99 °F (37.2 °C) (07/25/23 0751)  Pulse: 86 (07/25/23 0806)  Resp: 18 (07/25/23 0806)  BP: 118/62 (07/25/23 0751)  SpO2: (!) 92 % (07/25/23 0806) Vital Signs (24h Range):  Temp:  [98.4 °F (36.9 °C)-100.4 °F (38 °C)] 99 °F (37.2 °C)  Pulse:  [] 86  Resp:  [16-21] 18  SpO2:  [89 %-100 %] 92 %  BP: (118-134)/(58-65) 118/62     Weight: 61.7 kg (136 lb 0.4 oz)  Body mass index is 24.88 kg/m².    Intake/Output Summary (Last 24 hours) at 7/25/2023 0922  Last data filed at 7/24/2023 1432  Gross per 24 hour   Intake 240 ml   Output 350 ml   Net -110 ml         Physical Exam  Constitutional:       Appearance: She is ill-appearing and toxic-appearing.   HENT:      Head: Normocephalic.   Eyes:      General: Scleral icterus present.   Neck:      Thyroid: No thyroid mass or thyroid tenderness.      Vascular: No carotid bruit or hepatojugular reflux.   Cardiovascular:      Rate and Rhythm: Normal rate.      Pulses: Decreased pulses.      Heart sounds: Heart sounds are distant.   Chest:      Chest wall: No mass, lacerations or deformity.   Breasts:     Right: No swelling.      Left: No swelling.   Abdominal:      General: Abdomen is flat.      Palpations: Abdomen is rigid.      Hernia: No hernia is present.   Genitourinary:     Vagina: Normal.   Musculoskeletal:      Cervical back: Rigidity present.   Lymphadenopathy:      Cervical: No cervical adenopathy.   Skin:     Coloration: Skin is jaundiced.      Findings: Ecchymosis present.   Neurological:      Mental Status: She is unresponsive.           Significant Labs: All pertinent labs within the past 24 hours have been reviewed.  CMP:   Recent Labs   Lab 07/24/23  0513 07/25/23  0248    143   K 3.8 3.5   * 118*    CO2 19* 17*   GLU 89 100   BUN 6 5*   CREATININE 0.3* 0.3*   CALCIUM 7.7* 7.6*   PROT 4.9* 4.4*   ALBUMIN 2.0* 1.8*   BILITOT 3.2* 3.1*   ALKPHOS 100 86   AST 31 27   ALT 14 11   ANIONGAP 4* 8       Significant Imaging: I have reviewed all pertinent imaging results/findings within the past 24 hours.

## 2023-07-25 NOTE — PLAN OF CARE
Case  spoke to the patient's sister Zaria to discuss the discharge plan to an accepting NH SNF. Zaria explained that neither NH SNF ( [facility], [facility]) was a good facility. Case  explained that the patient is medically ready and one of the accepting NH SNF would need to submit for authorization today. Her choice of NH SNF was needed first thing today to move forward with the discharge plan. Zaria stated that she would need to call Case  back with the NH SNF decision.    9:24  Zaria called Case  back and stated she wanted the patient to go to [facility]. Zaria also asked that the patient be told about the NH SNF. Zaria also asked that the NH SNF that have not accepted or denied the patient yet be follow up on for hope that the patient could go to one of those other facilities instead of [facility]. Rosa notified of all of the above information.

## 2023-07-25 NOTE — PT/OT/SLP PROGRESS
Occupational Therapy   Treatment    Name: Marely Hamilton  MRN: [id number]  Admitting Diagnosis:  Gastrointestinal hemorrhage associated with duodenal ulcer  14 Days Post-Op    Recommendations:     Discharge Recommendations: nursing facility, skilled  Discharge Equipment Recommendations:  to be determined by next level of care  Barriers to discharge:  Inaccessible home environment, Decreased caregiver support    Assessment:     Marely Hamilton is a 58 y.o. female with a medical diagnosis of Gastrointestinal hemorrhage associated with duodenal ulcer. Performance deficits affecting function are weakness, impaired endurance, impaired self care skills, impaired functional mobility, gait instability, impaired balance, impaired cognition, decreased coordination, decreased upper extremity function, decreased lower extremity function, abnormal tone, decreased ROM, impaired coordination, impaired fine motor, impaired skin, impaired cardiopulmonary response to activity. Pt engaged well in today's session and participated in seated ADLs and seated B UE AAROM/PROM exercises. In today's session pt performed dynamic sit balance exercises for reaching with min A <> max A. During session, pt easily distractible but pleasant. On this date, pt limited by general weakness and unsteadiness. Patient would benefit from continued skilled acute OT 3 x/wk to improve functional mobility, increase independence with ADLs, and address established goals. Recommending SNF once medically appropriate for discharge to increase maximal independence, reduce burden of care, and ensure safety.    Rehab Prognosis:  Good; patient would benefit from acute skilled OT services to address these deficits and reach maximum level of function.       Plan:     Patient to be seen 3 x/week to address the above listed problems via self-care/home management, therapeutic activities, therapeutic exercises, cognitive retraining  Plan of Care Expires: 08/11/23  Plan of Care  Reviewed with: patient    Subjective     Chief Complaint: No complaints  Patient/Family Comments/goals: Feeling better  Pain/Comfort:  Pain Rating 1: 0/10  Pain Rating Post-Intervention 1: 0/10    Objective:     Communicated with: NSG prior to session.  Patient found supine with telemetry, pressure relief boots (size wise bed) upon OT entry to room.    General Precautions: Standard, aspiration, fall    Orthopedic Precautions:N/A  Braces: N/A  Respiratory Status: Room air     Occupational Performance:     Bed Mobility:    Patient completed Rolling/Turning to Left with  maximal assistance  Patient completed Scooting/Bridging with maximal assistance to place feet flat on floor  Patient completed Supine to Sit with maximal assistance and 2 persons to manage LE and trunk  Patient completed Sit to Supine with total assistance and 2 persons     Activities of Daily Living:  Grooming: pt able to wash face with wash cloth with min <> max A for balance while seated EOB    Lower Body Dressing: total assistance to don socks supine in bed       [facility] 6 Click ADL: 9    Treatment & Education:  Role of OT and POC  ADL retraining  Functional mobility training  Safety  Discharge planning  Importance EOB/OOB activity    Pt sat EOB and engaged in dynamic sit balance with min A <> max A to perform reaching exercises.     Seated EOB pt engaged in minimal AAROM/PROM B UE exercises for shoulder flexion and scapular mobilization within available and pain free planes of motion to increase joint mobility and strength/endurance to improve engagement in ADLs,    Patient left supine with all lines intact, call button in reach, and all needs met    GOALS:   Multidisciplinary Problems       Occupational Therapy Goals          Problem: Occupational Therapy    Goal Priority Disciplines Outcome Interventions   Occupational Therapy Goal     OT, PT/OT Ongoing, Progressing    Description: Goals to be met by: 7/28/2023     Patient will increase functional  independence with ADLs by performing:    UE Dressing with Minimal Assistance.  LE Dressing with Moderate Assistance.  Grooming while EOB with Minimal Assistance.  Toileting from bedside commode with Maximum Assistance for hygiene and clothing management.   Supine to sit with Moderate Assistance.  Stand pivot transfers with Maximum Assistance with or without AD.  Toilet transfer to bedside commode with Maximum Assistance with or without AD.                         Time Tracking:     OT Date of Treatment: 07/25/23  OT Start Time: 0849  OT Stop Time: 0912  OT Total Time (min): 23 min    Billable Minutes:Self Care/Home Management 10  Therapeutic Activity 13               7/25/2023

## 2023-07-25 NOTE — PT/OT/SLP PROGRESS
"Physical Therapy Treatment    Patient Name:  Marely Hamilton   MRN:  [id number]    Recommendations:     Discharge Recommendations: nursing facility, skilled  Discharge Equipment Recommendations: wheelchair, bedside commode, bath bench, grab bar (walk in shower with grab bars)  Barriers to discharge: None    Assessment:     Marely Hamilton is a 58 y.o. female admitted with a medical diagnosis of Gastrointestinal hemorrhage associated with duodenal ulcer.  She presents with the following impairments/functional limitations: weakness, impaired balance, decreased safety awareness, impaired endurance, impaired functional mobility, decreased lower extremity function, decreased coordination, impaired cognition. Patient demonstrated improved LE function and bed mobility at present visit but continues to be limited by cognition and weakness. Continue to recommend discharge to SNF to maximize progress and return to prior level of function.     Rehab Prognosis: Fair due to cognitive involvement; patient would benefit from acute skilled PT services to address these deficits and reach maximum level of function.    Recent Surgery: Procedure(s) (LRB):  EGD (ESOPHAGOGASTRODUODENOSCOPY) (N/A) 14 Days Post-Op    Plan:     During this hospitalization, patient to be seen 3 x/week to address the identified rehab impairments via therapeutic activities, therapeutic exercises, neuromuscular re-education and progress toward the following goals:    Plan of Care Expires:  08/10/23    Plan for next session: re-attempt stand, practice bed mobility     Subjective     Chief Complaint: nothing   Patient/Family Comments/goals: "ok lets do it" re: discussion of plan about stand attempt  Pain/Comfort:  Pain Rating 1: 0/10  Pain Rating Post-Intervention 1: 0/10      Objective:     Communicated with RN prior to session.  Patient found HOB elevated with pressure relief boots upon PT entry to room.     General Precautions: Standard, fall, " aspiration  Orthopedic Precautions: N/A  Braces: N/A  Respiratory Status: Room air     Functional Mobility:  Bed Mobility:     Rolling Left:  moderate assistance at UE and trunk. Patient able to achieve 1x of [place].   Rolling Right: moderate assistance at UE and trunk  Supine to Sit: maximal assistance  Sit to Supine: maximal assistance  Transfers:     Attempt- Sit to Stand:  dependence with hand-held assist, not able to clear off bed. Patient additionally expressed some apprehension during second attempt leading to cessation of additional trials.     Balance  EOB: able to achieve SBA however some instances of maxA when patient leans backwards    AM-PAC 6 CLICK MOBILITY  Turning over in bed (including adjusting bedclothes, sheets and blankets)?: 2  Sitting down on and standing up from a chair with arms (e.g., wheelchair, bedside commode, etc.): 2  Moving from lying on back to sitting on the side of the bed?: 2  Moving to and from a bed to a chair (including a wheelchair)?: 1  Need to walk in hospital room?: 1  Climbing 3-5 steps with a railing?: 1  Basic Mobility Total Score: 9       Treatment & Education:  Therapeutic Exercise  EOB balance exercise with cues for UE placement on bed for support.   LE exercises to prepare for standing and retain LE mobility and strength. LLE<RLE for muscular activation to achieve full ROM.     Therapeutic Activity   Bed mobility as above.   Sit to stand attempts x2 with cues to place feet on ground, shift weight forward, place UE to assist, and activate LE to push into ground.   EOB seated reaching and self care activity with washcloth to wash face.       Patient left HOB elevated with all lines intact and call button in reach..    GOALS:   Multidisciplinary Problems       Physical Therapy Goals          Problem: Physical Therapy    Goal Priority Disciplines Outcome Goal Variances Interventions   Physical Therapy Goal     PT, PT/OT Ongoing, Progressing     Description: Goals to be met  by: 8/10/23     Patient will increase functional independence with mobility by performin. Supine to sit with Moderate Assistance  2. Rolling to Left  with Minimal Assistance.  3. Sit to stand transfer with Moderate Assistance  4. Bed to chair transfer with Maximum Assistance   5. Sitting at edge of bed x10 minutes with Contact Guard Assistance to perform functional activities.   6. Lower extremity exercise program x10 reps per handout, with assistance as needed to improve strength for functional activities.                          Time Tracking:     PT Received On: 23  PT Start Time: 1508     PT Stop Time: 1536  PT Total Time (min): 28 min     Billable Minutes: Therapeutic Activity 10 minutes and Therapeutic Exercise 18 minutes    Treatment Type: Treatment  PT/PTA: PT     Number of PTA visits since last PT visit: 0     2023

## 2023-07-25 NOTE — PLAN OF CARE
Pt AAOx3, no c/o of pain reported. BLE edema +4 noted. Puncture site on right groin draining serous fluid, mepilex dressing in place. Turn Q2h  with wedge. VSS, no acute distress noted. Cont to monitor.    Problem: Adult Inpatient Plan of Care  Goal: Plan of Care Review  Outcome: Ongoing, Progressing  Goal: Absence of Hospital-Acquired Illness or Injury  Outcome: Ongoing, Progressing  Goal: Optimal Comfort and Wellbeing  Outcome: Ongoing, Progressing  Goal: Readiness for Transition of Care  Outcome: Ongoing, Progressing     Problem: Impaired Wound Healing  Goal: Optimal Wound Healing  Outcome: Ongoing, Progressing     Problem: Fall Injury Risk  Goal: Absence of Fall and Fall-Related Injury  Outcome: Ongoing, Progressing     Problem: Skin Injury Risk Increased  Goal: Skin Health and Integrity  Outcome: Ongoing, Progressing

## 2023-07-25 NOTE — NURSING
Dr Hurd and Dr Coates notified regarding wound on her rectum that seemed to tunnel and bleed. Wound care consulted to assess and picture taken on the media. Pt had rectal tube that was d/c yesterday.

## 2023-07-26 LAB
ALBUMIN SERPL BCP-MCNC: 2 G/DL (ref 3.5–5.2)
ALP SERPL-CCNC: 90 U/L (ref 55–135)
ALT SERPL W/O P-5'-P-CCNC: 12 U/L (ref 10–44)
ANION GAP SERPL CALC-SCNC: 9 MMOL/L (ref 8–16)
APTT PPP: 33.1 SEC (ref 21–32)
AST SERPL-CCNC: 28 U/L (ref 10–40)
BASOPHILS # BLD AUTO: 0.01 K/UL (ref 0–0.2)
BASOPHILS NFR BLD: 0.4 % (ref 0–1.9)
BILIRUB SERPL-MCNC: 3.7 MG/DL (ref 0.1–1)
BUN SERPL-MCNC: 5 MG/DL (ref 6–20)
CA-I BLDV-SCNC: 1.19 MMOL/L (ref 1.06–1.42)
CA-I BLDV-SCNC: 1.21 MMOL/L (ref 1.06–1.42)
CALCIUM SERPL-MCNC: 7.6 MG/DL (ref 8.7–10.5)
CHLORIDE SERPL-SCNC: 116 MMOL/L (ref 95–110)
CO2 SERPL-SCNC: 18 MMOL/L (ref 23–29)
CREAT SERPL-MCNC: 0.4 MG/DL (ref 0.5–1.4)
DIFFERENTIAL METHOD: ABNORMAL
EOSINOPHIL # BLD AUTO: 0.1 K/UL (ref 0–0.5)
EOSINOPHIL NFR BLD: 4 % (ref 0–8)
ERYTHROCYTE [DISTWIDTH] IN BLOOD BY AUTOMATED COUNT: ABNORMAL % (ref 11.5–14.5)
EST. GFR  (NO RACE VARIABLE): >60 ML/MIN/1.73 M^2
GLUCOSE SERPL-MCNC: 94 MG/DL (ref 70–110)
HCT VFR BLD AUTO: 25.5 % (ref 37–48.5)
HGB BLD-MCNC: 8.1 G/DL (ref 12–16)
IMM GRANULOCYTES # BLD AUTO: 0.01 K/UL (ref 0–0.04)
IMM GRANULOCYTES NFR BLD AUTO: 0.4 % (ref 0–0.5)
LYMPHOCYTES # BLD AUTO: 0.4 K/UL (ref 1–4.8)
LYMPHOCYTES NFR BLD: 17.5 % (ref 18–48)
MAGNESIUM SERPL-MCNC: 1.6 MG/DL (ref 1.6–2.6)
MCH RBC QN AUTO: 32.7 PG (ref 27–31)
MCHC RBC AUTO-ENTMCNC: 31.8 G/DL (ref 32–36)
MCV RBC AUTO: 103 FL (ref 82–98)
MONOCYTES # BLD AUTO: 0.2 K/UL (ref 0.3–1)
MONOCYTES NFR BLD: 9.1 % (ref 4–15)
NEUTROPHILS # BLD AUTO: 1.7 K/UL (ref 1.8–7.7)
NEUTROPHILS NFR BLD: 68.6 % (ref 38–73)
NRBC BLD-RTO: 0 /100 WBC
PLATELET # BLD AUTO: 51 K/UL (ref 150–450)
PMV BLD AUTO: 11.2 FL (ref 9.2–12.9)
POCT GLUCOSE: 103 MG/DL (ref 70–110)
POCT GLUCOSE: 110 MG/DL (ref 70–110)
POCT GLUCOSE: 149 MG/DL (ref 70–110)
POCT GLUCOSE: 98 MG/DL (ref 70–110)
POTASSIUM SERPL-SCNC: 3.1 MMOL/L (ref 3.5–5.1)
PROT SERPL-MCNC: 4.8 G/DL (ref 6–8.4)
RBC # BLD AUTO: 2.48 M/UL (ref 4–5.4)
SARS-COV-2 RNA RESP QL NAA+PROBE: NOT DETECTED
SODIUM SERPL-SCNC: 143 MMOL/L (ref 136–145)
WBC # BLD AUTO: 2.52 K/UL (ref 3.9–12.7)

## 2023-07-26 PROCEDURE — 99900035 HC TECH TIME PER 15 MIN (STAT)

## 2023-07-26 PROCEDURE — 25000003 PHARM REV CODE 250: Performed by: HOSPITALIST

## 2023-07-26 PROCEDURE — 99232 PR SUBSEQUENT HOSPITAL CARE,LEVL II: ICD-10-PCS | Mod: ,,, | Performed by: INTERNAL MEDICINE

## 2023-07-26 PROCEDURE — 85025 COMPLETE CBC W/AUTO DIFF WBC: CPT

## 2023-07-26 PROCEDURE — 80053 COMPREHEN METABOLIC PANEL: CPT

## 2023-07-26 PROCEDURE — 94668 MNPJ CHEST WALL SBSQ: CPT

## 2023-07-26 PROCEDURE — 36415 COLL VENOUS BLD VENIPUNCTURE: CPT

## 2023-07-26 PROCEDURE — 97535 SELF CARE MNGMENT TRAINING: CPT

## 2023-07-26 PROCEDURE — 63600175 PHARM REV CODE 636 W HCPCS: Performed by: STUDENT IN AN ORGANIZED HEALTH CARE EDUCATION/TRAINING PROGRAM

## 2023-07-26 PROCEDURE — 20600001 HC STEP DOWN PRIVATE ROOM

## 2023-07-26 PROCEDURE — 51701 INSERT BLADDER CATHETER: CPT

## 2023-07-26 PROCEDURE — 25000242 PHARM REV CODE 250 ALT 637 W/ HCPCS: Performed by: HOSPITALIST

## 2023-07-26 PROCEDURE — 83735 ASSAY OF MAGNESIUM: CPT

## 2023-07-26 PROCEDURE — 27000221 HC OXYGEN, UP TO 24 HOURS

## 2023-07-26 PROCEDURE — 25000003 PHARM REV CODE 250: Performed by: INTERNAL MEDICINE

## 2023-07-26 PROCEDURE — 85730 THROMBOPLASTIN TIME PARTIAL: CPT

## 2023-07-26 PROCEDURE — C9113 INJ PANTOPRAZOLE SODIUM, VIA: HCPCS | Performed by: STUDENT IN AN ORGANIZED HEALTH CARE EDUCATION/TRAINING PROGRAM

## 2023-07-26 PROCEDURE — 94640 AIRWAY INHALATION TREATMENT: CPT

## 2023-07-26 PROCEDURE — 51798 US URINE CAPACITY MEASURE: CPT

## 2023-07-26 PROCEDURE — 25000003 PHARM REV CODE 250

## 2023-07-26 PROCEDURE — 99232 SBSQ HOSP IP/OBS MODERATE 35: CPT | Mod: ,,, | Performed by: INTERNAL MEDICINE

## 2023-07-26 PROCEDURE — 90792 PR PSYCHIATRIC DIAGNOSTIC EVALUATION W/MEDICAL SERVICES: ICD-10-PCS | Mod: GC,,, | Performed by: PSYCHIATRY & NEUROLOGY

## 2023-07-26 PROCEDURE — 92526 ORAL FUNCTION THERAPY: CPT

## 2023-07-26 PROCEDURE — 87635 SARS-COV-2 COVID-19 AMP PRB: CPT | Performed by: INTERNAL MEDICINE

## 2023-07-26 PROCEDURE — 82330 ASSAY OF CALCIUM: CPT | Mod: 91

## 2023-07-26 PROCEDURE — 94761 N-INVAS EAR/PLS OXIMETRY MLT: CPT

## 2023-07-26 PROCEDURE — 90792 PSYCH DIAG EVAL W/MED SRVCS: CPT | Mod: GC,,, | Performed by: PSYCHIATRY & NEUROLOGY

## 2023-07-26 RX ADMIN — CLARITHROMYCIN 500 MG: 500 TABLET, FILM COATED ORAL at 08:07

## 2023-07-26 RX ADMIN — PANTOPRAZOLE SODIUM 40 MG: 40 INJECTION, POWDER, FOR SOLUTION INTRAVENOUS at 08:07

## 2023-07-26 RX ADMIN — PANTOPRAZOLE SODIUM 40 MG: 40 INJECTION, POWDER, FOR SOLUTION INTRAVENOUS at 09:07

## 2023-07-26 RX ADMIN — IPRATROPIUM BROMIDE AND ALBUTEROL SULFATE 3 ML: 2.5; .5 SOLUTION RESPIRATORY (INHALATION) at 02:07

## 2023-07-26 RX ADMIN — ACETYLCYSTEINE 2 ML: 200 SOLUTION ORAL; RESPIRATORY (INHALATION) at 08:07

## 2023-07-26 RX ADMIN — OXYCODONE HYDROCHLORIDE 5 MG: 5 TABLET ORAL at 09:07

## 2023-07-26 RX ADMIN — LACTULOSE 10 G: 20 SOLUTION ORAL at 03:07

## 2023-07-26 RX ADMIN — LACTULOSE 10 G: 20 SOLUTION ORAL at 08:07

## 2023-07-26 RX ADMIN — OLANZAPINE 7.5 MG: 5 TABLET, FILM COATED ORAL at 09:07

## 2023-07-26 RX ADMIN — AMOXICILLIN 1000 MG: 500 CAPSULE ORAL at 09:07

## 2023-07-26 RX ADMIN — IPRATROPIUM BROMIDE AND ALBUTEROL SULFATE 3 ML: 2.5; .5 SOLUTION RESPIRATORY (INHALATION) at 08:07

## 2023-07-26 RX ADMIN — LEVETIRACETAM 1000 MG: 500 SOLUTION ORAL at 09:07

## 2023-07-26 RX ADMIN — IPRATROPIUM BROMIDE AND ALBUTEROL SULFATE 3 ML: 2.5; .5 SOLUTION RESPIRATORY (INHALATION) at 07:07

## 2023-07-26 RX ADMIN — LEVETIRACETAM 1000 MG: 500 SOLUTION ORAL at 08:07

## 2023-07-26 RX ADMIN — LACTULOSE 10 G: 20 SOLUTION ORAL at 09:07

## 2023-07-26 RX ADMIN — ACETYLCYSTEINE 2 ML: 200 SOLUTION ORAL; RESPIRATORY (INHALATION) at 07:07

## 2023-07-26 RX ADMIN — AMOXICILLIN 1000 MG: 500 CAPSULE ORAL at 08:07

## 2023-07-26 RX ADMIN — CLARITHROMYCIN 500 MG: 500 TABLET, FILM COATED ORAL at 09:07

## 2023-07-26 RX ADMIN — ACETYLCYSTEINE 2 ML: 200 SOLUTION ORAL; RESPIRATORY (INHALATION) at 02:07

## 2023-07-26 NOTE — SUBJECTIVE & OBJECTIVE
Interval History: T max 99.1, NH placement is pending. Overall little improvement in functional status and I think palliative care may be needed in near future,  I will discuss with sister today.    Review of Systems  Objective:     Vital Signs (Most Recent):  Temp: 98.5 °F (36.9 °C) (07/26/23 0744)  Pulse: 82 (07/26/23 0817)  Resp: 18 (07/26/23 0817)  BP: 119/63 (07/26/23 0744)  SpO2: 95 % (07/26/23 0817) Vital Signs (24h Range):  Temp:  [98.5 °F (36.9 °C)-99.1 °F (37.3 °C)] 98.5 °F (36.9 °C)  Pulse:  [80-98] 82  Resp:  [16-19] 18  SpO2:  [90 %-97 %] 95 %  BP: (118-149)/(61-76) 119/63     Weight: 61.7 kg (136 lb 0.4 oz)  Body mass index is 24.88 kg/m².    Intake/Output Summary (Last 24 hours) at 7/26/2023 0920  Last data filed at 7/26/2023 0900  Gross per 24 hour   Intake 840 ml   Output --   Net 840 ml         Physical Exam  Constitutional:       Appearance: She is ill-appearing and toxic-appearing.   HENT:      Head: Normocephalic.   Eyes:      General: Scleral icterus present.   Neck:      Thyroid: No thyroid mass or thyroid tenderness.      Vascular: No carotid bruit or hepatojugular reflux.   Cardiovascular:      Rate and Rhythm: Normal rate.      Pulses: Decreased pulses.      Heart sounds: Heart sounds are distant.   Chest:      Chest wall: No mass, lacerations or deformity.   Breasts:     Right: No swelling.      Left: No swelling.   Abdominal:      General: Abdomen is flat.      Palpations: Abdomen is rigid.      Hernia: No hernia is present.   Genitourinary:     Vagina: Normal.   Musculoskeletal:      Cervical back: Rigidity present.   Lymphadenopathy:      Cervical: No cervical adenopathy.   Skin:     Coloration: Skin is jaundiced.      Findings: Ecchymosis present.   Neurological:      Mental Status: She is unresponsive.           Significant Labs: All pertinent labs within the past 24 hours have been reviewed.  BMP:   Recent Labs   Lab 07/26/23  0519   GLU 94      K 3.1*   *   CO2 18*   BUN  5*   CREATININE 0.4*   CALCIUM 7.6*   MG 1.6       Significant Imaging: I have reviewed all pertinent imaging results/findings within the past 24 hours.

## 2023-07-26 NOTE — CONSULTS
"CONSULTATION LIAISON PSYCHIATRY INITIAL EVALUATION    Patient Name: Marely Hamilton  MRN: [id number]  Patient Class: IP- Inpatient  Admission Date: 7/5/2023  Attending Physician: Jermain Coates MD      HPI:   Marely Hamilton is a 58 y.o. female with past psychiatric history of bipolar disorder, alcohol use disorder & past pertinent medical history of seizure disorder, alcohol induced hepatic cirrhosis presents to the ED/admitted to the hospital for Gastrointestinal hemorrhage associated with duodenal ulcer. Patient presented from SNF (when she experienced alida blood per rectum per chart review), for which she was recently discharged to when she presented to the hospital on for hepatic encephalopathy c/b retroperitoneal bleed (s/p ICV filter and IR embolization).        Psychiatry consulted for medication management    On psych exam, when asked how pt was doing, patient stated that she was "shopping for shoes" and that she "rebuilt a porch." She also stated that she has been seeing "doctors and " in the room. She is oriented to person and year. She stated that she was in the hospital for a manic depressive episode.     On reassessment with supervising physician, patient stated that her mood was "better" and that she did not like the "cameras behind her back." Patient also endorsed seeing fish in the room. When asked, she stated that she felt safe and that she did not feel like anyone was plotting against her.    Interval Events:   Nurse attempted several times to perform in and out cath on pt  she needs a u/a she is very combative she states do not touch me I am pregnant nurse redirected several times unsuccessful.    Collateral with patient's permission:   None at this time    History obtained via chart review and updated as appropriate.      Medical Review of Systems:  Pertinent items are noted in HPI.    Psychiatric Review of Systems (is patient experiencing or having changes in):  sleep: no  appetite: " "no  weight: no  energy/anergy: yes  interest/pleasure/anhedonia: no  somatic symptoms: yes  libido: no  anxiety/panic: no  guilty/hopelessness: no  concentration: yes  Jeana:yes  Psychosis: no  Trauma: no  S.I.B.s/risky behavior: no    History obtained via chart review and updated as appropriate.      Past Psychiatric History:  Previous Medication Trials: yes  Previous Psychiatric Hospitalizations:yes, multiple    Previous Suicide Attempts: yes  History of Violence: no  Outpatient Psychiatrist: yes, Dr Coates  Family Psychiatric History: yes, reports father with possible bipolar disorder       Substance Abuse History (with emphasis over the last 12 months):  Recreational Drugs:  denies  Use of Alcohol:  history of heavy alcohol use with resultant alcohol induced hepatic cirhosis, denies current consumption of alcohol   Tobacco Use: denies  Rehab History:yes    Social History:  Marital Status: not   Children: 0  Employment Status/Info: on disability  :no  Education: post college graduate work or degree, (per chart review patient with [facility] from [employer])  Special Ed: no  Housing Status: with family  Access to gun: no  Psychosocial Stressors: health and drug and alcohol  Functioning Relationships: good support system    Legal History:  Past Charges/Incarcerations: Reports DUI  Pending charges:no    Mental Status Exam:  General Appearance: dressed in hospital garb, lying in bed, appears older than stated age, cachetic, thin and gaunt  Behavior: cooperative, friendly, pleasant, polite, appropriate eye-contact, under good behavioral control  Involuntary Movements and Motor Activity: no abnormal involuntary movements noted  Gait and Station: unable to assess - patient lying down or seated  Speech and Language: decreased spontaneity, increased latency of response, slowed, responds to questions minimally/briefly  Mood: "better"  Affect: flat, calm  Thought Process and Associations: bizarre, " +loosening of associations  Thought Content and Perceptions:: no suicidal ideation, no homicidal ideation, + paranoid ideation  Sensorium and Orientation: oriented to person and place  Recent and Remote Memory: impaired, forgetful  Attention and Concentration: impaired  Fund of Knowledge: Unable to Formally Assess  Insight: impaired  Judgment: impaired    CAM ICU positive? no      ASSESSMENT & RECOMMENDATIONS   Hx of Bipolar Disorder    PSYCH MEDICATIONS  Scheduled: Recommend increasing zyprexa to 7.5 mg nightly  Will not restart lithium at this time. Patient with a history of hypercalcemia 2/2 lithium toxicity. Will continue maintenance therapy with zyprexa.  Does not appear to be requiring PRNs at this time, in future if patient does become agitated recommend Zyprexa 5 mg PO/IM.   Monitor QTc with daily EKGs while on zyprexa. Recommend Repeat EKG to monitor Qtc  Discontinue zyprexa if Qtc >480    DELIRIUM PRECAUTION BEHAVIOR MANAGEMENT  PLEASE utilize CHEMICAL restraints with PRN meds first for agitation. Minimize use of PHYSICAL restraints OR have periods of being out of physical restraints if possible.  Keep window shades open and room lit during day and room dim at night in order to promote normal sleep-wake cycles  Encourage family at bedside. [place] patient often to situation, location, date.  Continue to Limit or Discontinue use of Narcotics, Benzos and Anti-cholinergic medications as they may worsen delirium.  Continue medical workup for causative etiology of Delirium.     RISK ASSESSMENT  NO NEED FOR PEC patient NOT in any imminent danger of hurting self or others and not gravely disabled.     FOLLOW UP  Will follow up while in house    DISPOSITION - once medically cleared:   Defer to medical team    Please contact ON CALL psychiatry service (24/7) for any acute issues that may arise.    Dr. Mehdi VACA Psychiatry  [facility]  7/26/2023 11:15  AM        --------------------------------------------------------------------------------------------------------------------------------------------------------------------------------------------------------------------------------------    CONTINUED HISTORY & OBJECTIVE clinical data & findings reviewed and noted for above decision making    Past Medical/Surgical History:   Past Medical History:   Diagnosis Date    Addiction to drug     Alcohol abuse     Alcohol abuse, in remission 6/15/2015    14.5 weeks ago; AA weekly    Anemia     Anxiety 6/15/2015    Behavioral problem     Bipolar disorder     Bipolar disorder in remission 6/15/2015    Cirrhosis, Laennec's 6/15/2015    Depression     Encounter for blood transfusion     Epistaxis 6/15/2015    Fatigue     Glaucoma     Hematuria     Hepatic encephalopathy 6/15/2015    Hepatic enlargement     History of psychiatric care     History of psychiatric hospitalization     History of seizure 6/15/2015    1    hx of intentional Tylenol overdose 6/15/2015    2005- situational and hx of bipolar    Hx of psychiatric care     Macrocytic anemia 9/18/2015    6 units PRBC since June 2015    Jeana     Osteoarthritis     Other ascites 6/15/2015    Psychiatric exam requested by authority     Psychiatric problem     Psychosis 9/26/2019    Renal disorder     Seizures     Self-harming behavior     Suicide attempt     Therapy     Thrombocytopenia 6/15/2015     Past Surgical History:   Procedure Laterality Date    COSMETIC SURGERY      EMBOLIZATION N/A 6/11/2023    Procedure: EMBOLIZATION, BLOOD VESSEL;  Surgeon: Debbie Surgeon;  [facility];  Service: Anesthesiology;  Laterality: N/A;    ESOPHAGOGASTRODUODENOSCOPY      ESOPHAGOGASTRODUODENOSCOPY N/A 7/6/2023    Procedure: EGD (ESOPHAGOGASTRODUODENOSCOPY);  Surgeon: Bernice Guillen MD;  Location: [facility] ([address] [place] [address]);  Service: Endoscopy;  Laterality: N/A;    ESOPHAGOGASTRODUODENOSCOPY N/A 7/11/2023    Procedure: EGD  (ESOPHAGOGASTRODUODENOSCOPY);  Surgeon: Rangel Broussard MD;  Location: [facility] ([address] [place] [address]);  Service: Endoscopy;  Laterality: N/A;       Current Medications:   Scheduled Meds:    acetylcysteine 200 mg/ml (20%)  2 mL Nebulization TID WAKE    albuterol-ipratropium  3 mL Nebulization Q6H WAKE    amoxicillin  1,000 mg Per NG tube Q12H    clarithromycin  500 mg Per NG tube Q12H    lactulose  10 g Per NG tube TID    levetiracetam  1,000 mg Per NG tube BID    OLANZapine  5 mg Per NG tube QHS    pantoprazole  40 mg Intravenous BID     PRN Meds: dextrose 10%, dextrose 10%, glucagon (human recombinant), insulin aspart U-100, oxyCODONE, sodium chloride 0.9%    Allergies:   Review of patient's allergies indicates:   Allergen Reactions    Sulfa (sulfonamide antibiotics) Rash    Codeine Nausea And Vomiting       Vitals  Vitals:    07/26/23 0817   BP:    Pulse: 82   Resp: 18   Temp:        Labs/Imaging/Studies:  Recent Results (from the past 24 hour(s))   POCT glucose    Collection Time: 07/25/23 12:13 PM   Result Value Ref Range    POCT Glucose 121 (H) 70 - 110 mg/dL   POCT glucose    Collection Time: 07/25/23  4:07 PM   Result Value Ref Range    POCT Glucose 100 70 - 110 mg/dL   Calcium, ionized    Collection Time: 07/25/23  4:11 PM   Result Value Ref Range    Ionized Calcium 1.02 (L) 1.06 - 1.42 mmol/L   POCT glucose    Collection Time: 07/25/23 11:49 PM   Result Value Ref Range    POCT Glucose 110 70 - 110 mg/dL   POCT glucose    Collection Time: 07/26/23  5:08 AM   Result Value Ref Range    POCT Glucose 103 70 - 110 mg/dL   APTT    Collection Time: 07/26/23  5:19 AM   Result Value Ref Range    aPTT 33.1 (H) 21.0 - 32.0 sec   Magnesium    Collection Time: 07/26/23  5:19 AM   Result Value Ref Range    Magnesium 1.6 1.6 - 2.6 mg/dL   Calcium, ionized    Collection Time: 07/26/23  5:19 AM   Result Value Ref Range    Ionized Calcium 1.21 1.06 - 1.42 mmol/L   CBC Auto Differential    Collection Time: 07/26/23  5:19 AM   Result  Value Ref Range    WBC 2.52 (L) 3.90 - 12.70 K/uL    RBC 2.48 (L) 4.00 - 5.40 M/uL    Hemoglobin 8.1 (L) 12.0 - 16.0 g/dL    Hematocrit 25.5 (L) 37.0 - 48.5 %     (H) 82 - 98 fL    MCH 32.7 (H) 27.0 - 31.0 pg    MCHC 31.8 (L) 32.0 - 36.0 g/dL    RDW SEE COMMENT 11.5 - 14.5 %    Platelets 51 (L) 150 - 450 K/uL    MPV 11.2 9.2 - 12.9 fL    Immature Granulocytes 0.4 0.0 - 0.5 %    Gran # (ANC) 1.7 (L) 1.8 - 7.7 K/uL    Immature Grans (Abs) 0.01 0.00 - 0.04 K/uL    Lymph # 0.4 (L) 1.0 - 4.8 K/uL    Mono # 0.2 (L) 0.3 - 1.0 K/uL    Eos # 0.1 0.0 - 0.5 K/uL    Baso # 0.01 0.00 - 0.20 K/uL    nRBC 0 0 /100 WBC    Gran % 68.6 38.0 - 73.0 %    Lymph % 17.5 (L) 18.0 - 48.0 %    Mono % 9.1 4.0 - 15.0 %    Eosinophil % 4.0 0.0 - 8.0 %    Basophil % 0.4 0.0 - 1.9 %    Differential Method Automated    Comprehensive Metabolic Panel    Collection Time: 07/26/23  5:19 AM   Result Value Ref Range    Sodium 143 136 - 145 mmol/L    Potassium 3.1 (L) 3.5 - 5.1 mmol/L    Chloride 116 (H) 95 - 110 mmol/L    CO2 18 (L) 23 - 29 mmol/L    Glucose 94 70 - 110 mg/dL    BUN 5 (L) 6 - 20 mg/dL    Creatinine 0.4 (L) 0.5 - 1.4 mg/dL    Calcium 7.6 (L) 8.7 - 10.5 mg/dL    Total Protein 4.8 (L) 6.0 - 8.4 g/dL    Albumin 2.0 (L) 3.5 - 5.2 g/dL    Total Bilirubin 3.7 (H) 0.1 - 1.0 mg/dL    Alkaline Phosphatase 90 55 - 135 U/L    AST 28 10 - 40 U/L    ALT 12 10 - 44 U/L    eGFR >60.0 >60 mL/min/1.73 m^2    Anion Gap 9 8 - 16 mmol/L     Imaging Results              CTA Acute GI [place], Abdomen and Pelvis (Final result)  Result time 07/05/23 17:15:19      Final result by Mumtaz Garsia MD (07/05/23 17:15:19)                   Impression:      Images are degraded by significant streak and motion artifacts.    Stable large left retroperitoneal hematoma and left iliacus hematoma.  No contrast extravasation within the hematomas or bowel to indicate active arterial bleed.    Hepatic cirrhosis.  Diffuse wall thickening of the stomach and colon,  which can be seen in the setting of hepatic dysfunction.  However, superimposed inflammatory processes are not excluded.    Multiple, nondilated, distended fluid-filled loops of small bowel without a definite transition point.  Stool and air seen within the colon, this is suggestive of ileus.    Small left pleural effusion with associated compressive atelectasis.    Cholelithiasis.    Cardiomegaly.    Electronically signed by resident: Emiliano Mcmahon  Date:    07/05/2023  Time:    16:29    Electronically signed by: Mumtaz Garsia MD  Date:    07/05/2023  Time:    17:15               Narrative:    EXAMINATION:  CTA ACUTE GI BLEED, ABDOMEN AND PELVIS    CLINICAL HISTORY:  GI bleed;    TECHNIQUE:  Initial noncontrast  images were obtained of the abdomen and pelvis.  CT axial angiography images were then obtained from the lung bases to the pubic symphysis following the intravenous administration of 100 of Omnipaque 350 with delayed images obtained per GI bleeding protocol.  Sagittal and coronal reformats were provided.    COMPARISON:  CTA GI bleed 06/13/2023    FINDINGS:  Images are degraded by significant streak and motion artifacts.    Lungs: Interval decrease in size of the small right pleural effusion with associated compressive atelectasis.  Resolution of the left pleural effusion.    Heart: Enlarged.  No pericardial effusion.    Liver: Nodular contour of the liver.  No focal abnormality.    Gallbladder: Multiple calcified gallstones.  No pericholecystic inflammatory changes.    Bile ducts: No intrahepatic or extrahepatic biliary ductal dilatation.    Spleen: Normal size.    Pancreas: No mass. No ductal dilatation. No peripancreatic fat stranding.    Adrenals: No significant abnormality    Renal/Ureters: Bilateral cortical thinning.  No hydronephrosis.  Proximal ureters are normal caliber.  The distal ureters are difficult to evaluate due to streak artifact.  Bladder is decompressed with Saldaña catheter in  place.    Reproductive: Uterus appears without significant abnormality.  No adnexal mass, noting limited evaluation due to significant streak artifact.    Stomach/Bowel: Stomach is distended with ingested contents with thickened rugal folds.  Small bowel is distended with multiple nondilated fluid-filled loops.  No transition point.  Appendix is normal.  Diffuse wall thickening throughout the colon with mild stool burden.  Diffuse wall thickening of the rectum.  No contrast extravasation to indicate active arterial bleed.    Peritoneum: Stable large left retroperitoneal hematoma measuring 11.5 x 7.8 x 12.6 cm.  No contrast extravasation to indicate active bleed within the hematoma.  Additional smaller hematoma anterior to the left iliacus muscle, which appears stable compared to prior CTA.  No free fluid. No intraperitoneal free air.    Lymph Nodes: Multiple prominent mesenteric and retroperitoneal lymph nodes.    Vasculature: Abdominal aorta tapers normally.  Mild atherosclerosis of the abdominal aorta and its branches.    Bones: Bony mineralization is diminished.  Left hip arthroplasty.  Generalized body wall edema.    Soft Tissues: No significant abnormality.                                       CT Head Without Contrast (Final result)  Result time 07/05/23 15:10:53      Final result by Viktor Desouza MD (07/05/23 15:10:53)                   Impression:      No evidence of acute intracranial pathology.    Volume loss again identified.    Electronically signed by resident: Kenney Rosas  Date:    07/05/2023  Time:    14:45    Electronically signed by: Viktor Desouza  Date:    07/05/2023  Time:    15:10               Narrative:    EXAMINATION:  CT HEAD WITHOUT CONTRAST    CLINICAL HISTORY:  Mental status change, unknown cause;    TECHNIQUE:  Low dose axial CT images obtained throughout the head without the use of intravenous contrast.  Axial, sagittal and coronal reconstructions were  performed.    COMPARISON:  CT head 06/22/2023    FINDINGS:  Intracranial compartment:    Volume loss without evidence of hydrocephalus.    No parenchymal  hemorrhage, edema, mass effect or major vascular distribution infarct.    Decreased attenuation in the periventricular white matter is nonspecific but may reflect mild chronic small vessel ischemic change vs other encephalopathy.    No extra-axial blood or fluid collections.    Skull/extracranial contents (limited evaluation):    No displaced calvarial fracture.    The visualized sinuses and mastoid air cells are clear.                                       X-Ray Chest AP Portable (Final result)  Result time 07/05/23 13:09:11      Final result by Americo Reyes MD (07/05/23 13:09:11)                   Impression:      No significant detrimental interval change in the appearance of the chest since 06/25/2023 is appreciated, with significant improvement as noted above.  No post procedure pneumothorax.      Electronically signed by: Americo Reyes MD  Date:    07/05/2023  Time:    13:09               Narrative:    EXAMINATION:  XR CHEST AP PORTABLE    TECHNIQUE:  One view was obtained.  Note is made of the fact that the thorax is obscured to some extent by opacities external to the patient, particularly defibrillator pads.    COMPARISON:  Comparison is made to the most recent prior chest radiograph of 06/25/2023.    FINDINGS:  Endotracheal tube has been placed, its tip lying just superior to the apex of the aortic arch, well above the katharina.  Left jugular origin vascular catheter is now seen, its tip in the superior vena cava near the junction of the right and left brachiocephalic veins.  Allowing for magnification of the cardiomediastinal silhouette related to projection, the heart is not significantly enlarged.  Opacity in both inferior hemithoraces seen on the previous examination has resolved, consistent with clearing of airspace consolidation in both mid/lower lung  zones and resolution of previously present bilateral pleural fluid.  Lung zones are currently clear and free of significant airspace consolidation or volume loss.  No pleural fluid of any substantial volume is seen on either side.  No pneumothorax.

## 2023-07-26 NOTE — PLAN OF CARE
07/26/23 1207   Post-Acute Status   Post-Acute Authorization Placement   Post-Acute Placement Status Pending post-acute provider review/more information requested     Per Rere at [facility], they received insurance auth and are awaiting admission paperwork completion by patient's sister.    Spoke with patient's sister, Zaria, who reports she spoke with Dr. Coates regarding patient's psych meds. Psychiatry and attending will discuss psych med recommendations. Zaria reports patient has been on lithium in the past and has been beneficial. Zaria reports she is working on the admission docusign paperwork now. Informed that [facility] does not have a bed today and prioritizes [facility] referrals, [facility] denied, [facility] denied, and [facility] has not return 's calls.    Spoke with Rere at [facility] who reports they are unable to accept patients on lithium. Updated MD. Attending MD and psych will discuss options.    3:12 PM  Received email from patient's sister stating she has submitted the admission paperwork to [facility]. Per MD, patient is being started on a new psych med and will need to be monitored today. Patient is expected to be ready for dc tomorrow.    4:08 PM  Spoke with patient's sister, Zaria, and provided update.    iMrela Cintron, LCSW [facility]  Ext. [phone]

## 2023-07-26 NOTE — PROGRESS NOTES
[facility] Medicine  Progress Note    Patient Name: Marely Hamilton  MRN: [id number]  Patient Class: IP- Inpatient   Admission Date: 7/5/2023  Length of Stay: 21 days  Attending Physician: Jermain Coates MD  Primary Care Provider: Viktor Ross MD        Subjective:     Principal Problem:Gastrointestinal hemorrhage associated with duodenal ulcer  Acute Condition: Debility        HPI:  Marely Hamilton is a 58 y.o. female with history of alcoholic cirrhosis, seizure disorder, DVT and IJ thrombus with recent admission for large retroperitoneal hemorrhage requiring IR embolization and IVC filter placement who presents for hematochezia. Onset this morning at [facility], alida blood per rectum per chart, sent to ED. Initially normotensive but then severe hypotension prompted initiation of levophed and intubation. Hgb 6.8, 2u pRBC given + 1u FFP ordered. Hgb 8.5 on 7/2. On levo and vaso currently. K 6.6 but renal function at baseline. Repeat pending. No prior EGDs on record.          Overview/Hospital Course:    Patient was seen at bedside, hematochezia still present post op by IR. Patient has required many transfusions of pRBCs and platelets due to ongoing down trending of Hgb. No clear source of ongoing hemorrhage by imaging. Patient has continued to require pressors to maintain MAP >65. Discussed with GI and IR regarding active bleeding post op, IR indicated there is not much else to do from their end bc they embolized a significant part of GDA. Pt is off of pressor requirement and not actively bleeding. GI re evaluated pt via EGD and found no sources of active bleeding. Pt is extubated and currently on BIPAP requirement due to de saturation in the 80's. Pt is to be seen by speech and PT/OT. Clear mucinous secretions via suction, chest physiotherapy, and cough assist device. Nebs to help with breathing as well. Pt is off BIPAP and currently on comfort flow. Triple therapy (amoxicillin,  clarithromycin) started for 14 days to treat for positive H pylori serology. Pt also had a NG tube placed so we may begin tube feedings. CxR, EKG, and ABG displayed no reason for tachycardia. Pt becomes anxious when seen by different provider teams. Remove art line and consult for midline placement. Continue to wean comfort flow as tolerated. Ensure pt is working with PT/OT/Speech for continuous improvement. Consult psych regarding patient history of bipolar disorder.         Interval History/Significant Events: Wean comfort flow as tolerated. Ensure patient is working with PT/OT/Speech for continuous improvement. Consult psych regarding psych history     7/16   history of alcoholic cirrhosis, seizure disorder, DVT and IJ thrombus with recent admission for large retroperitoneal hemorrhage requiring IR embolization and IVC filter placement who presents for hematochezia. s/p GI and IR in which they clipped (GI) and embolized (IR) possible sources of duodenal ulcer bleeding.  Hb stable  s/p extubation. sats 92% on 5L of NC.  CX ray - Detrimental changes since the previous examination including interval increase in pulmonary vascularity and bilateral diffuse interstitial pulmonary parenchymal opacification, progression of abnormal opacification at the left lung base, and development of small right-sided pleural effusion.   findings would be consistent with congestive heart failure.PT/OT recs SNF. BNP , procalcitonin and Echo.  SLP recs NPO .  On NGT feeds. psychiatry following for bipolar disorders.  Recs Zyprexa 5 mg QHS . Ammonia 48 WNL. AAOX 1. Hepatology consulted for cirrhosis. UA ordered   7/17 Saldaña removed. UA +. UC pending. started on IV ceftriaxone. ammonia at 59. on B/L UE mittens as pulled of NGT. Hepatology eval. resumed lactulose . discussed with sister and updated clinical status   7/18  MBS today - started puree nectar thick liquids.  on oxygen 3L via NC.   wean  as tolerated . UC -YEAST 10,000 - 49,999  cfu/ml Identification pending No other significant isolate. hydroxyzine  PRN discontinued  due to it not being safe in delirium. oriented to person, hospital and year   7/19 SLP recs - tolerating Puree Diet - IDDSI Level 4, Mildly thick/Nectar thick liquids - IDDSI Level 2 .stool output 600ml/ monitor on lactulose . improved mentation AAOX 3      Interval History: T max 99.1, NH placement is pending. Overall little improvement in functional status and I think palliative care may be needed in near future,  I will discuss with sister today.    Review of Systems  Objective:     Vital Signs (Most Recent):  Temp: 98.5 °F (36.9 °C) (07/26/23 0744)  Pulse: 82 (07/26/23 0817)  Resp: 18 (07/26/23 0817)  BP: 119/63 (07/26/23 0744)  SpO2: 95 % (07/26/23 0817) Vital Signs (24h Range):  Temp:  [98.5 °F (36.9 °C)-99.1 °F (37.3 °C)] 98.5 °F (36.9 °C)  Pulse:  [80-98] 82  Resp:  [16-19] 18  SpO2:  [90 %-97 %] 95 %  BP: (118-149)/(61-76) 119/63     Weight: 61.7 kg (136 lb 0.4 oz)  Body mass index is 24.88 kg/m².    Intake/Output Summary (Last 24 hours) at 7/26/2023 0920  Last data filed at 7/26/2023 0900  Gross per 24 hour   Intake 840 ml   Output --   Net 840 ml         Physical Exam  Constitutional:       Appearance: She is ill-appearing and toxic-appearing.   HENT:      Head: Normocephalic.   Eyes:      General: Scleral icterus present.   Neck:      Thyroid: No thyroid mass or thyroid tenderness.      Vascular: No carotid bruit or hepatojugular reflux.   Cardiovascular:      Rate and Rhythm: Normal rate.      Pulses: Decreased pulses.      Heart sounds: Heart sounds are distant.   Chest:      Chest wall: No mass, lacerations or deformity.   Breasts:     Right: No swelling.      Left: No swelling.   Abdominal:      General: Abdomen is flat.      Palpations: Abdomen is rigid.      Hernia: No hernia is present.   Genitourinary:     Vagina: Normal.   Musculoskeletal:      Cervical back: Rigidity present.   Lymphadenopathy:       Cervical: No cervical adenopathy.   Skin:     Coloration: Skin is jaundiced.      Findings: Ecchymosis present.   Neurological:      Mental Status: She is unresponsive.           Significant Labs: All pertinent labs within the past 24 hours have been reviewed.  BMP:   Recent Labs   Lab 07/26/23  0519   GLU 94      K 3.1*   *   CO2 18*   BUN 5*   CREATININE 0.4*   CALCIUM 7.6*   MG 1.6       Significant Imaging: I have reviewed all pertinent imaging results/findings within the past 24 hours.      Assessment/Plan:      * Gastrointestinal hemorrhage associated with duodenal ulcer    as  above        Dysphagia  7/18  MBS today -started puree nectar thick liquids.        Hypoxia   7/16 sats 92% on 5L of NC.  CX ray - Detrimental changes since the previous examination including interval increase in pulmonary vascularity and bilateral diffuse interstitial pulmonary parenchymal opacification, progression of abnormal opacification at the left lung base, and development of small right-sided pleural effusion.   findings would be consistent with congestive heart failure.PT/OT recs SNF. BNP , procalcitonin and Echo.     H. pylori infection    positive serology for H pylori, begin triple therapy with clarithromycin,amoxicillin, and PPI for 14 days       Hematochezia    - GI EGD clipped duodenal ulcers for IR reference  - Transfuse blood products for active bleeding   - trend CBC and follow  - IR embolized GDA for duodenal hyperemia   -- GI re evaluated site of duodenal ulcers, no noted active bleeding  -- Continue PPI BID for 8 weeks, then once daily after that  --Pt had positive serology for H pylori, begin triple therapy with clarithromycin,amoxicillin, and PPI for 14 days      Acute blood loss anemia     - Maintain IV access with 2 large bore Ivs  - Intravascular resuscitation/support with IVFs   - Hold all NSAIDs and anticoagulants, unless contraindicated.  - Please correct any coagulopathy with platelets and FFP  for goal of platelets >50K and INR <2.0  - Please notify GI team if there is significant change in patient's clinical status  - To date, patient has required at least 21 units of pRBCs to maintain Hgb >7     7/16     Patient's with macrocytic anemia.. Hemoglobin stable. Etiology likely due to acute blood loss.  Current CBC reviewed-    Recent Labs   Lab 07/14/23  0752 07/15/23  0350 07/16/23  0825   HGB 8.6* 8.1* 8.0*         Component Value Date/Time     (H) 07/16/2023 0825    RDW 24.7 (H) 07/16/2023 0825    IRON 132 05/18/2023 1527    FERRITIN 110 05/18/2023 1527    RETIC 3.9 (H) 06/05/2023 0935    FOLATE 16.2 05/31/2023 1250    MNDWPDLE59 >2000 (H) 05/31/2023 1250    OCCULTBLOOD Negative 09/19/2015 0137     Monitor CBC and transfuse if H/H drops below 7/21.        Retroperitoneal bleed  Retroperitoneum: No significant adenopathy.  Similar sized left retroperitoneal hematoma measuring 11 x 9 cm (series 5, image 100; previously 11 x 9 cm).  The left iliacus collection also measures similar to prior at 5.4 cm (series 5, image 127; previously 5.6 cm).  Layering hyperdensity within these collections suggesting different ages in blood products.  No significant volume active extravasation into these collections  - CT-A demonstrated stable retroperitoneal hematoma. No need for intervention at this time.          Supratherapeutic INR  patient with INR   Recent Labs   Lab 07/17/23  0619 07/18/23  0525 07/19/23  0350   INR 1.9* 1.7* 1.6*   . INR is supratherapeutic. secondary to coagulopathy from liver disease.monitor      UTI (urinary tract infection)  7/17 Saldaña removed. UA +. UC pending. started on IV ceftriaxone.  7/18 UC -YEAST 10,000 - 49,999 cfu/ml Identification pending No other significant isolate    Bipolar 1 disorder     - Consult psych regarding Bipolar 1 disorder history       Hepatic encephalopathy   ammonia 190s on admit -->90s . AAOX 1. no lactulose give due to GI bleed  repeat ammonia. Hepatolog eval   with     MELD 3.0: 21 at 7/17/2023  6:19 AM  MELD-Na: 18 at 7/17/2023  6:19 AM  Calculated from:  Serum Creatinine: 0.4 mg/dL (Using min of 1 mg/dL) at 7/17/2023  6:19 AM  Serum Sodium: 143 mmol/L (Using max of 137 mmol/L) at 7/17/2023  6:19 AM  Total Bilirubin: 3.0 mg/dL at 7/17/2023  6:19 AM  Serum Albumin: 2.2 g/dL at 7/17/2023  6:19 AM  INR(ratio): 1.9 at 7/17/2023  6:19 AM  Age at listing (hypothetical): 58 years  Sex: Female at 7/17/2023  6:19 AM  7/16 Ammonia 48 WNL. AAOX 1. Hepatology consulted for cirrhosis.    7/17  Hepatology eval. resumed lactulose.  7/18  hydroxyzine  PRN  discontinued  due to it not being safe in delirium    Severe protein-calorie malnutrition  Nutrition consulted. Most recent weight and BMI monitored-     Measurements:  Wt Readings from Last 1 Encounters:   07/16/23 62.7 kg (138 lb 4.4 oz)   Body mass index is 25.29 kg/m².    Patient has been screened and assessed by RD.    Malnutrition Type:  Context: social/environmental circumstances  Level: severe    Malnutrition Characteristic Summary:  Energy Intake (Malnutrition): less than or equal to 75% for greater than or equal to 1 month  Subcutaneous Fat (Malnutrition): severe depletion  Muscle Mass (Malnutrition): severe depletion      History of seizure  on Keppra via NGT       Thrombocytopenia  with cirrhosis   Patient with thrombocytopenia   Recent Labs   Lab 07/17/23  0619 07/18/23  0525 07/19/23  0350   PLT 34* 32* 34*   . Platelet counts stable  .monitor      Cirrhosis, Laennec's  alcoholic cirrhosis  MELD 3.0: 20 at 7/16/2023  4:51 AM  MELD-Na: 17 at 7/16/2023  4:51 AM  Calculated from:  Serum Creatinine: 0.4 mg/dL (Using min of 1 mg/dL) at 7/16/2023  4:51 AM  Serum Sodium: 145 mmol/L (Using max of 137 mmol/L) at 7/16/2023  4:51 AM  Total Bilirubin: 3.0 mg/dL at 7/16/2023  4:51 AM  Serum Albumin: 2.3 g/dL at 7/16/2023  4:51 AM  INR(ratio): 1.8 at 7/15/2023  3:50 AM  Age at listing (hypothetical): 58 years  Sex: Female at  7/16/2023  4:51 AM     Recent Labs   Lab 07/16/23  1201   AMMONIA 48     Strict input /output monitor, daily weights        VTE Risk Mitigation (From admission, onward)         Ordered     Place sequential compression device  Until discontinued         07/14/23 1024     Reason for No Pharmacological VTE Prophylaxis  Once        Question:  Reasons:  Answer:  Active Bleeding    07/05/23 1334     IP VTE HIGH RISK PATIENT  Once         07/05/23 1334                Discharge Planning   BRAYAN: 7/26/2023     Code Status: DNR   Is the patient medically ready for discharge?: No    Reason for patient still in hospital (select all that apply): Patient unstable  Discharge Plan A: Skilled Nursing Facility   Discharge Delays: (!) Post-Acute Set-up              Jermain Cotaes MD  Department of Hospital Medicine   [facility] - Telemetry Stepdown

## 2023-07-26 NOTE — PLAN OF CARE
Pt AAOx2/3, disorganized thoughts. No c/o of pain reported. Pt is turned q2h. BLE +2 edema noted. VSS, no acute distress noted. Cont to monitor.    Problem: Adult Inpatient Plan of Care  Goal: Plan of Care Review  Outcome: Ongoing, Progressing  Goal: Absence of Hospital-Acquired Illness or Injury  Outcome: Ongoing, Progressing  Goal: Optimal Comfort and Wellbeing  Outcome: Ongoing, Progressing  Goal: Readiness for Transition of Care  Outcome: Ongoing, Progressing     Problem: Impaired Wound Healing  Goal: Optimal Wound Healing  Outcome: Ongoing, Progressing     Problem: Fall Injury Risk  Goal: Absence of Fall and Fall-Related Injury  Outcome: Ongoing, Progressing

## 2023-07-26 NOTE — PLAN OF CARE
Problem: Infection  Goal: Absence of Infection Signs and Symptoms  Outcome: Ongoing, Progressing     Problem: Adult Inpatient Plan of Care  Goal: Plan of Care Review  Outcome: Ongoing, Progressing  Goal: Patient-Specific Goal (Individualized)  Outcome: Ongoing, Progressing  Goal: Absence of Hospital-Acquired Illness or Injury  Outcome: Ongoing, Progressing  Goal: Optimal Comfort and Wellbeing  Outcome: Ongoing, Progressing  Goal: Readiness for Transition of Care  Outcome: Ongoing, Progressing     Problem: Fluid and Electrolyte Imbalance (Acute Kidney Injury/Impairment)  Goal: Fluid and Electrolyte Balance  Outcome: Ongoing, Progressing  Pt sitting up in bed watching tv hob elevated resp even and unlabored no distress noted at this time pt feed self today with tray setup she did not put out urine in and out catheter peformed 400ml output safety precautions  maintained.        Arava Counseling:  Patient counseled regarding adverse effects of Arava including but not limited to nausea, vomiting, abnormalities in liver function tests. Patients may develop mouth sores, rash, diarrhea, and abnormalities in blood counts. The patient understands that monitoring is required including LFTs and blood counts.  There is a rare possibility of scarring of the liver and lung problems that can occur when taking methotrexate. Persistent nausea, loss of appetite, pale stools, dark urine, cough, and shortness of breath should be reported immediately. Patient advised to discontinue Arava treatment and consult with a physician prior to attempting conception. The patient will have to undergo a treatment to eliminate Arava from the body prior to conception.

## 2023-07-26 NOTE — PT/OT/SLP PROGRESS
Speech Language Pathology Treatment    Patient Name:  Marely Hamilton   MRN:  [id number]  Admitting Diagnosis: Gastrointestinal hemorrhage associated with duodenal ulcer    Recommendations:                 General Recommendations:  Dysphagia therapy  Diet recommendations:  Puree Diet - IDDSI Level 4, Liquid Diet Level: Thin liquids - IDDSI Level 0   Aspiration Precautions: 1 bite/sip at a time, Assistance with meals and Assistance with thickening liquids, Eliminate distractions, Feed only when awake/alert, Frequent oral care, HOB to 90 degrees, Monitor for s/s of aspiration, Remain upright 30 minutes post meal, Small bites/sips, and Strict aspiration precautions   General Precautions: Standard, fall, aspiration  Communication strategies:  provide increased time to answer and go to room if call light pushed    Assessment:     Marely Hamilton is a 58 y.o. female with an SLP diagnosis of mild Oropharyngeal dysphagia. Pt demonstrates good tolerance of thin liquids across previous SLP sessions, with SLP rec for cautious liquid level upgrade at this time. Pt's cognitive status and impulsivity will continue to heighten risk of aspiration, therefore supervision during any/all po intake recommended.      Subjective     Spoke with RN prior to entering pt's room.   Pt awake/alert and agreeable to participate. Intermittent confusion and confabulations persist.     Pain/Comfort:  Pain Rating 1: 0/10  Pain Rating Post-Intervention 1: 0/10    Respiratory Status: Nasal cannula, flow 2 L/min    Objective:     Has the patient been evaluated by SLP for swallowing?   Yes  Keep patient NPO? No   Current Respiratory Status:  nasal cannula     SLP assessed the pt across x4 cup sips thin water, x2 straw sips thin water, and x3 tsp pudding. She continues to present without observable s/sx airway threat across all trials. When offered pt-led trials, she consumed single bites/sips. Vocal quality remained clear. Despite improved performance and  decreased impulsivity during trials this date, pt would continue to benefit from supervision during meal time to ensure adherence to safe swallow precautions. SLP rec: cautious liquid level upgrade to thin consistency at this time. SLP will continue to follow.     Goals:   Multidisciplinary Problems       SLP Goals          Problem: SLP    Goal Priority Disciplines Outcome   SLP Goal     SLP Ongoing, Progressing   Description: Speech Pathology Goals  To be met by 7/27/23  1. Pt will participate in ongoing diagnostic dysphagia therapy                         Plan:     Patient to be seen:  3 x/week   Plan of Care expires:  08/12/23  Plan of Care reviewed with:  patient   SLP Follow-Up:  Yes       Discharge recommendations:  nursing facility, skilled   Barriers to Discharge:  Level of Skilled Assistance Needed      Time Tracking:     SLP Treatment Date:   07/26/23  Speech Start Time:  1021  Speech Stop Time:  1028     Speech Total Time (min):  7 min    Billable Minutes: Treatment Swallowing Dysfunction 7    07/26/2023

## 2023-07-26 NOTE — PT/OT/SLP PROGRESS
Occupational Therapy   Treatment    Name: Marely Hamilton  MRN: [id number]  Admitting Diagnosis:  Gastrointestinal hemorrhage associated with duodenal ulcer  15 Days Post-Op    Recommendations:     Discharge Recommendations: nursing facility, skilled  Discharge Equipment Recommendations:  to be determined by next level of care  Barriers to discharge:  Inaccessible home environment, Decreased caregiver support    Assessment:     Marely Hamilton is a 58 y.o. female with a medical diagnosis of Gastrointestinal hemorrhage associated with duodenal ulcer. Performance deficits affecting function are weakness, impaired endurance, impaired self care skills, impaired functional mobility, gait instability, impaired balance, impaired cognition, decreased upper extremity function, decreased lower extremity function, decreased safety awareness, impaired cardiopulmonary response to activity. Pt engaged well in today's session and was eager to sit EOB to participate in therapy. During session pt easily distracted and repeating words therapist stated. She performed seated ADLs and minimal B UE therex on EOB. On this date, pt required CGA <> max A to remain upright when seated EOB. Patient would benefit from continued skilled acute OT 3 x/wk to improve functional mobility, increase independence with ADLs, and address established goals. Recommending SNF once medically appropriate for discharge to increase maximal independence, reduce burden of care, and ensure safety.    Rehab Prognosis:  Good; patient would benefit from acute skilled OT services to address these deficits and reach maximum level of function.       Plan:     Patient to be seen 3 x/week to address the above listed problems via self-care/home management, therapeutic activities, therapeutic exercises, cognitive retraining  Plan of Care Expires: 08/11/23  Plan of Care Reviewed with: patient    Subjective     Chief Complaint: No complaints  Patient/Family Comments/goals:  Feeling better  Pain/Comfort:  Pain Rating 1: 0/10  Pain Rating Post-Intervention 1: 0/10    Objective:     Communicated with: NSG prior to session.  Patient found supine with PureWick, pressure relief boots, oxygen upon OT entry to room.    General Precautions: Standard, fall, aspiration    Orthopedic Precautions:N/A  Braces: N/A  Respiratory Status: Nasal cannula, flow 2 L/min     Occupational Performance:     Bed Mobility:    Patient completed Rolling/Turning to Right with maximal assistance and 1 persons  Patient completed Scooting/Bridging with maximal assistance and 1 persons  Patient completed Supine to Sit with maximal assistance and 1 persons. While seated pt demonstrated strong posterior lean requiring occasional max A to remain upright.  Patient completed Sit to Supine with total assistance and 2 persons     Activities of Daily Living:  Grooming: Pt washed face with wash cloth seated EOB with CGA <> max A for balance      [facility] 6 Click ADL: 10    Treatment & Education:  Role of OT and POC  ADL retraining  Functional mobility training  Safety  Importance EOB/OOB activity    Pt sit balanced fluctuated CGA <> Max A on this date. When performing reaching and UE therex, pt required max A for dynamic sit balance but could maintain CGA with static sit.    Seated EOB pt engaged in minimal B UE AROM therex in all available and pain free planes of motion and then performed B reaching exercises.    Patient left supine with all lines intact, call button in reach, and all needs met.    GOALS:   Multidisciplinary Problems       Occupational Therapy Goals          Problem: Occupational Therapy    Goal Priority Disciplines Outcome Interventions   Occupational Therapy Goal     OT, PT/OT Ongoing, Progressing    Description: Goals to be met by: 7/28/2023     Patient will increase functional independence with ADLs by performing:    UE Dressing with Minimal Assistance.  LE Dressing with Moderate Assistance.  Grooming while  EOB with Minimal Assistance.  Toileting from bedside commode with Maximum Assistance for hygiene and clothing management.   Supine to sit with Moderate Assistance.  Stand pivot transfers with Maximum Assistance with or without AD.  Toilet transfer to bedside commode with Maximum Assistance with or without AD.                         Time Tracking:     OT Date of Treatment: 07/26/23  OT Start Time: 1104  OT Stop Time: 1122  OT Total Time (min): 18 min    Billable Minutes:Therapeutic Activity 18               7/26/2023

## 2023-07-27 PROBLEM — R33.9 URINARY RETENTION: Status: ACTIVE | Noted: 2023-07-27

## 2023-07-27 LAB
ALBUMIN SERPL BCP-MCNC: 2 G/DL (ref 3.5–5.2)
ALP SERPL-CCNC: 94 U/L (ref 55–135)
ALT SERPL W/O P-5'-P-CCNC: 14 U/L (ref 10–44)
ANION GAP SERPL CALC-SCNC: 10 MMOL/L (ref 8–16)
ANISOCYTOSIS BLD QL SMEAR: SLIGHT
APTT PPP: 35 SEC (ref 21–32)
AST SERPL-CCNC: 27 U/L (ref 10–40)
BASOPHILS # BLD AUTO: 0.01 K/UL (ref 0–0.2)
BASOPHILS NFR BLD: 0.5 % (ref 0–1.9)
BILIRUB SERPL-MCNC: 4.1 MG/DL (ref 0.1–1)
BUN SERPL-MCNC: 5 MG/DL (ref 6–20)
BURR CELLS BLD QL SMEAR: ABNORMAL
CA-I BLDV-SCNC: 1.14 MMOL/L (ref 1.06–1.42)
CA-I BLDV-SCNC: 1.18 MMOL/L (ref 1.06–1.42)
CALCIUM SERPL-MCNC: 7.6 MG/DL (ref 8.7–10.5)
CHLORIDE SERPL-SCNC: 115 MMOL/L (ref 95–110)
CO2 SERPL-SCNC: 17 MMOL/L (ref 23–29)
CREAT SERPL-MCNC: 0.3 MG/DL (ref 0.5–1.4)
DIFFERENTIAL METHOD: ABNORMAL
EOSINOPHIL # BLD AUTO: 0.2 K/UL (ref 0–0.5)
EOSINOPHIL NFR BLD: 8.3 % (ref 0–8)
ERYTHROCYTE [DISTWIDTH] IN BLOOD BY AUTOMATED COUNT: ABNORMAL % (ref 11.5–14.5)
EST. GFR  (NO RACE VARIABLE): >60 ML/MIN/1.73 M^2
GLUCOSE SERPL-MCNC: 86 MG/DL (ref 70–110)
HCT VFR BLD AUTO: 26.6 % (ref 37–48.5)
HGB BLD-MCNC: 8.4 G/DL (ref 12–16)
IMM GRANULOCYTES # BLD AUTO: 0 K/UL (ref 0–0.04)
IMM GRANULOCYTES NFR BLD AUTO: 0 % (ref 0–0.5)
LYMPHOCYTES # BLD AUTO: 0.5 K/UL (ref 1–4.8)
LYMPHOCYTES NFR BLD: 24.5 % (ref 18–48)
MAGNESIUM SERPL-MCNC: 1.6 MG/DL (ref 1.6–2.6)
MCH RBC QN AUTO: 33.3 PG (ref 27–31)
MCHC RBC AUTO-ENTMCNC: 31.6 G/DL (ref 32–36)
MCV RBC AUTO: 106 FL (ref 82–98)
MONOCYTES # BLD AUTO: 0.2 K/UL (ref 0.3–1)
MONOCYTES NFR BLD: 9.3 % (ref 4–15)
NEUTROPHILS # BLD AUTO: 1.2 K/UL (ref 1.8–7.7)
NEUTROPHILS NFR BLD: 57.4 % (ref 38–73)
NRBC BLD-RTO: 0 /100 WBC
OVALOCYTES BLD QL SMEAR: ABNORMAL
PLATELET # BLD AUTO: 62 K/UL (ref 150–450)
PMV BLD AUTO: 10.7 FL (ref 9.2–12.9)
POCT GLUCOSE: 121 MG/DL (ref 70–110)
POCT GLUCOSE: 173 MG/DL (ref 70–110)
POCT GLUCOSE: 268 MG/DL (ref 70–110)
POCT GLUCOSE: 98 MG/DL (ref 70–110)
POIKILOCYTOSIS BLD QL SMEAR: SLIGHT
POLYCHROMASIA BLD QL SMEAR: ABNORMAL
POTASSIUM SERPL-SCNC: 3.2 MMOL/L (ref 3.5–5.1)
PROT SERPL-MCNC: 5 G/DL (ref 6–8.4)
RBC # BLD AUTO: 2.52 M/UL (ref 4–5.4)
SODIUM SERPL-SCNC: 142 MMOL/L (ref 136–145)
WBC # BLD AUTO: 2.16 K/UL (ref 3.9–12.7)

## 2023-07-27 PROCEDURE — 99232 SBSQ HOSP IP/OBS MODERATE 35: CPT | Mod: ,,, | Performed by: PSYCHIATRY & NEUROLOGY

## 2023-07-27 PROCEDURE — 25000003 PHARM REV CODE 250: Performed by: INTERNAL MEDICINE

## 2023-07-27 PROCEDURE — 85025 COMPLETE CBC W/AUTO DIFF WBC: CPT

## 2023-07-27 PROCEDURE — 93010 EKG 12-LEAD: ICD-10-PCS | Mod: ,,, | Performed by: INTERNAL MEDICINE

## 2023-07-27 PROCEDURE — 27000221 HC OXYGEN, UP TO 24 HOURS

## 2023-07-27 PROCEDURE — 94640 AIRWAY INHALATION TREATMENT: CPT

## 2023-07-27 PROCEDURE — 20600001 HC STEP DOWN PRIVATE ROOM

## 2023-07-27 PROCEDURE — 99900035 HC TECH TIME PER 15 MIN (STAT)

## 2023-07-27 PROCEDURE — 99233 PR SUBSEQUENT HOSPITAL CARE,LEVL III: ICD-10-PCS | Mod: ,,, | Performed by: HOSPITALIST

## 2023-07-27 PROCEDURE — 99233 SBSQ HOSP IP/OBS HIGH 50: CPT | Mod: ,,, | Performed by: HOSPITALIST

## 2023-07-27 PROCEDURE — 85730 THROMBOPLASTIN TIME PARTIAL: CPT

## 2023-07-27 PROCEDURE — 36415 COLL VENOUS BLD VENIPUNCTURE: CPT

## 2023-07-27 PROCEDURE — 93010 ELECTROCARDIOGRAM REPORT: CPT | Mod: ,,, | Performed by: INTERNAL MEDICINE

## 2023-07-27 PROCEDURE — 25000003 PHARM REV CODE 250

## 2023-07-27 PROCEDURE — 94761 N-INVAS EAR/PLS OXIMETRY MLT: CPT

## 2023-07-27 PROCEDURE — 99900031 HC PATIENT EDUCATION (STAT)

## 2023-07-27 PROCEDURE — 93005 ELECTROCARDIOGRAM TRACING: CPT

## 2023-07-27 PROCEDURE — 83735 ASSAY OF MAGNESIUM: CPT

## 2023-07-27 PROCEDURE — 25000242 PHARM REV CODE 250 ALT 637 W/ HCPCS: Performed by: HOSPITALIST

## 2023-07-27 PROCEDURE — 51701 INSERT BLADDER CATHETER: CPT

## 2023-07-27 PROCEDURE — 99232 PR SUBSEQUENT HOSPITAL CARE,LEVL II: ICD-10-PCS | Mod: ,,, | Performed by: PSYCHIATRY & NEUROLOGY

## 2023-07-27 PROCEDURE — 82330 ASSAY OF CALCIUM: CPT | Mod: 91

## 2023-07-27 PROCEDURE — 80053 COMPREHEN METABOLIC PANEL: CPT

## 2023-07-27 PROCEDURE — 25000003 PHARM REV CODE 250: Performed by: HOSPITALIST

## 2023-07-27 RX ORDER — LEVETIRACETAM 100 MG/ML
1000 SOLUTION ORAL 2 TIMES DAILY
Status: DISCONTINUED | OUTPATIENT
Start: 2023-07-27 | End: 2023-08-04 | Stop reason: HOSPADM

## 2023-07-27 RX ORDER — LACTULOSE 10 G/15ML
10 SOLUTION ORAL 3 TIMES DAILY
Status: DISCONTINUED | OUTPATIENT
Start: 2023-07-27 | End: 2023-07-29

## 2023-07-27 RX ORDER — OLANZAPINE 5 MG/1
5 TABLET ORAL NIGHTLY
Status: DISCONTINUED | OUTPATIENT
Start: 2023-07-27 | End: 2023-08-04 | Stop reason: HOSPADM

## 2023-07-27 RX ORDER — PANTOPRAZOLE SODIUM 40 MG/1
40 FOR SUSPENSION ORAL 2 TIMES DAILY
Status: DISCONTINUED | OUTPATIENT
Start: 2023-07-27 | End: 2023-08-04 | Stop reason: HOSPADM

## 2023-07-27 RX ADMIN — ACETYLCYSTEINE 2 ML: 200 SOLUTION ORAL; RESPIRATORY (INHALATION) at 02:07

## 2023-07-27 RX ADMIN — OXYCODONE HYDROCHLORIDE 5 MG: 5 TABLET ORAL at 08:07

## 2023-07-27 RX ADMIN — PANTOPRAZOLE SODIUM 40 MG: 40 GRANULE, DELAYED RELEASE ORAL at 08:07

## 2023-07-27 RX ADMIN — LEVETIRACETAM 1000 MG: 500 SOLUTION ORAL at 08:07

## 2023-07-27 RX ADMIN — POTASSIUM BICARBONATE 40 MEQ: 391 TABLET, EFFERVESCENT ORAL at 08:07

## 2023-07-27 RX ADMIN — INSULIN ASPART 2 UNITS: 100 INJECTION, SOLUTION INTRAVENOUS; SUBCUTANEOUS at 05:07

## 2023-07-27 RX ADMIN — LACTULOSE 10 G: 20 SOLUTION ORAL at 08:07

## 2023-07-27 RX ADMIN — IPRATROPIUM BROMIDE AND ALBUTEROL SULFATE 3 ML: 2.5; .5 SOLUTION RESPIRATORY (INHALATION) at 02:07

## 2023-07-27 RX ADMIN — IPRATROPIUM BROMIDE AND ALBUTEROL SULFATE 3 ML: 2.5; .5 SOLUTION RESPIRATORY (INHALATION) at 09:07

## 2023-07-27 RX ADMIN — LACTULOSE 10 G: 20 SOLUTION ORAL at 02:07

## 2023-07-27 RX ADMIN — OLANZAPINE 5 MG: 5 TABLET, FILM COATED ORAL at 08:07

## 2023-07-27 NOTE — ASSESSMENT & PLAN NOTE
7/17 Saldaña removed. UA +. UC pending. started on IV ceftriaxone.  7/18 UC -YEAST 10,000 - 49,999 cfu/ml Identification pending No other significant isolate  7/28 continues with confusion. UA + . UC pending. continue ceftriaxone. no fistula found on sacral exam with wound care.   7/29   UC - GRAM NEGATIVE BILLY 10,000 - 49,999 cfu/ml  Identification and susceptibility pending     7/31 UC with klebsialla penumoniae and Aerogenes resisant to ceftriaxone. started on ciprofloxacin .

## 2023-07-27 NOTE — ASSESSMENT & PLAN NOTE
patient with INR   Recent Labs   Lab 07/23/23  0434   INR 1.5*   . INR is supratherapeutic. secondary to coagulopathy from liver disease.monitor

## 2023-07-27 NOTE — ASSESSMENT & PLAN NOTE
- Maintain IV access with 2 large bore Ivs  - Intravascular resuscitation/support with IVFs   - Hold all NSAIDs and anticoagulants, unless contraindicated.  - Please correct any coagulopathy with platelets and FFP for goal of platelets >50K and INR <2.0  - Please notify GI team if there is significant change in patient's clinical status  - To date, patient has required at least 21 units of pRBCs to maintain Hgb >7     7/16     Patient's with macrocytic anemia.. Hemoglobin stable. Etiology likely due to acute blood loss.  Current CBC reviewed-    Recent Labs   Lab 07/31/23  0444 08/01/23  0501 08/02/23  0504   HGB 8.8* 8.2* 8.7*         Component Value Date/Time     (H) 08/02/2023 0504    RDW 23.4 (H) 08/02/2023 0504    IRON 132 05/18/2023 1527    FERRITIN 110 05/18/2023 1527    RETIC 3.9 (H) 06/05/2023 0935    FOLATE 11.5 07/17/2023 0619    [id number] 947 07/17/2023 0619    OCCULTBLOOD Negative 09/19/2015 0137     Monitor CBC and transfuse if H/H drops below 7/21.

## 2023-07-27 NOTE — PROGRESS NOTES
Jimmy Phillip - Telemetry [facility]  Progress Note    Patient Name: Marely Hamilton  MRN: [id number]  Patient Class: IP- Inpatient   Admission Date: 7/5/2023  Length of Stay: 22 days  Attending Physician: Seth Noyola MD  Primary Care Provider: Viktor Ross MD        Subjective:     Principal Problem:Gastrointestinal hemorrhage associated with duodenal ulcer        HPI:  Marely Hamilton is a 58 y.o. female with history of alcoholic cirrhosis, seizure disorder, DVT and IJ thrombus with recent admission for large retroperitoneal hemorrhage requiring IR embolization and IVC filter placement who presents for hematochezia. Onset this morning at , alida blood per rectum per chart, sent to ED. Initially normotensive but then severe hypotension prompted initiation of levophed and intubation. Hgb 6.8, 2u pRBC given + 1u FFP ordered. Hgb 8.5 on 7/2. On levo and vaso currently. K 6.6 but renal function at baseline. Repeat pending. No prior EGDs on record.          Overview/Hospital Course:    Patient was seen at bedside, hematochezia still present post op by IR. Patient has required many transfusions of pRBCs and platelets due to ongoing down trending of Hgb. No clear source of ongoing hemorrhage by imaging. Patient has continued to require pressors to maintain MAP >65. Discussed with GI and IR regarding active bleeding post op, IR indicated there is not much else to do from their end bc they embolized a significant part of GDA. Pt is off of pressor requirement and not actively bleeding. GI re evaluated pt via EGD and found no sources of active bleeding. Pt is extubated and currently on BIPAP requirement due to de saturation in the 80's. Pt is to be seen by speech and PT/OT. Clear mucinous secretions via suction, chest physiotherapy, and cough assist device. Nebs to help with breathing as well. Pt is off BIPAP and currently on comfort flow. Triple therapy (amoxicillin, clarithromycin) started for 14 days  to treat for positive H pylori serology. Pt also had a NG tube placed so we may begin tube feedings. CxR, EKG, and ABG displayed no reason for tachycardia. Pt becomes anxious when seen by different provider teams. Remove art line and consult for midline placement. Continue to wean comfort flow as tolerated. Ensure pt is working with PT/OT/Speech for continuous improvement. Consult psych regarding patient history of bipolar disorder.         Interval History/Significant Events: Wean comfort flow as tolerated. Ensure patient is working with PT/OT/Speech for continuous improvement. Consult psych regarding psych history     7/16   history of alcoholic cirrhosis, seizure disorder, DVT and IJ thrombus with recent admission for large retroperitoneal hemorrhage requiring IR embolization and IVC filter placement who presents for hematochezia. s/p GI and IR in which they clipped (GI) and embolized (IR) possible sources of duodenal ulcer bleeding.  Hb stable  s/p extubation. sats 92% on 5L of NC.  CX ray - Detrimental changes since the previous examination including interval increase in pulmonary vascularity and bilateral diffuse interstitial pulmonary parenchymal opacification, progression of abnormal opacification at the left lung base, and development of small right-sided pleural effusion.   findings would be consistent with congestive heart failure.PT/OT recs SNF. BNP , procalcitonin and Echo.  SLP recs NPO .  On NGT feeds. psychiatry following for bipolar disorders.  Recs Zyprexa 5 mg QHS . Ammonia 48 WNL. AAOX 1. Hepatology consulted for cirrhosis. UA ordered   7/17 Saldaña removed. UA +. UC pending. started on IV ceftriaxone. ammonia at 59. on B/L UE mittens as pulled of NGT. Hepatology eval. resumed lactulose . discussed with sister and updated clinical status   7/18  MBS today - started puree nectar thick liquids.  on oxygen 3L via NC.   wean  as tolerated . UC -YEAST 10,000 - 49,999 cfu/ml Identification pending No  other significant isolate. hydroxyzine  PRN discontinued  due to it not being safe in delirium. oriented to person, hospital and year   7/19 SLP recs - tolerating Puree Diet - IDDSI Level 4, Mildly thick/Nectar thick liquids - IDDSI Level 2 .stool output 600ml/ monitor on lactulose . improved mentation AAOX 3  7/26 Await NH placement. Little improvement in functional status. Tunneled fistula seen near rectum. Low grade temp.  7/26 K replaced. Await NH placement. Little improvement in functional status. Tunneled fistula seen near rectum. Low grade temp. Consulted psych for recs concerning discharge.   7/27 Sister wanted her started back on lithium but psychiatry recommended  increasing Zyprexa 7.5mg QHS. EKG to monitor QtcDiscontinue zyprexa if Qtc >480. EKG QTC 493ms.  Zyprexa resumed at  zyprexa 5 nightly per psychiatry . Urinary retention this AM s/p straight cath         Review of Systems:   Pain scale:   Constitutional:  fever,  chills, headache, vision loss, hearing loss, weight loss, Generalized weakness, falls, loss of smell, loss of taste, poor appetite,  sore throat  Respiratory: cough, shortness of breath.   Cardiovascular: chest pain, dizziness, palpitations, orthopnea, swelling of feet, syncope  Gastrointestinal: nausea, vomiting, abdominal pain, diarrhea, black stool,  blood in stool, change in bowel habits  Genitourinary: hematuria, dysuria, urgency, frequency  Integument/Breast: rash,  pruritis  Hematologic/Lymphatic: easy bruising, lymphadenopathy  Musculoskeletal: arthralgias , myalgias, back pain, neck pain, knee pain  Neurological: confusion, seizures, tremors, slurred speech  Behavioral/Psych:  depression, anxiety, auditory or visual hallucinations     OBJECTIVE:     Physical Exam:  Body mass index is 24.68 kg/m².    Constitutional: Appears well-developed and well-nourished.   Head: Normocephalic and atraumatic. + icterus  Neck: Normal range of motion. Neck supple.   Cardiovascular: Normal heart  rate.  Regular heart rhythm.  Pulmonary/Chest: Effort normal.   Abdominal: No distension.  No tenderness.    Musculoskeletal: Normal range of motion.   Neurological: Alert and  oriented to person, hospital and year.  follows simple commands   Skin: Skin is warm and dry. ehcymoses on the upper chest    Psychiatric: Normal mood and affect. Behavior is normal.                  Vital Signs  Temp: 98.4 °F (36.9 °C) (07/27/23 1926)  Pulse: 97 (07/27/23 1926)  Resp: 18 (07/27/23 1926)  BP: 128/69 (07/27/23 1926)  SpO2: 100 % (07/27/23 1531)     24 Hour VS Range    Temp:  [97.7 °F (36.5 °C)-98.4 °F (36.9 °C)]   Pulse:  []   Resp:  [16-19]   BP: (125-149)/(58-73)   SpO2:  [92 %-100 %]     Intake/Output Summary (Last 24 hours) at 7/27/2023 1950  Last data filed at 7/27/2023 1300  Gross per 24 hour   Intake 50 ml   Output 450 ml   Net -400 ml         I/O This Shift:  No intake/output data recorded.    Wt Readings from Last 3 Encounters:   07/27/23 61.2 kg (134 lb 14.7 oz)   07/03/23 44.7 kg (98 lb 8.7 oz)   06/29/23 59.9 kg (132 lb 0.9 oz)       I have personally reviewed the vitals and recorded Intake/Output     Laboratory/Diagnostic Data:    CBC/Anemia Labs: Coags:    Recent Labs   Lab 07/25/23  0248 07/26/23  0519 07/27/23  0602   WBC 2.76* 2.52* 2.16*   HGB 7.6* 8.1* 8.4*   HCT 24.4* 25.5* 26.6*   PLT 46* 51* 62*   * 103* 106*   RDW 25.7* SEE COMMENT SEE COMMENT    Recent Labs   Lab 07/21/23  0507 07/23/23  0434 07/24/23  0513 07/25/23  0248 07/26/23  0519 07/27/23  0602   INR 1.4* 1.5*  --   --   --   --    APTT  --  33.6*   < > 34.7* 33.1* 35.0*    < > = values in this interval not displayed.        Chemistries: ABG:   Recent Labs   Lab 07/25/23  0248 07/26/23  0519 07/27/23  0601 07/27/23  0602    143  --  142   K 3.5 3.1*  --  3.2*   * 116*  --  115*   CO2 17* 18*  --  17*   BUN 5* 5*  --  5*   CREATININE 0.3* 0.4*  --  0.3*   CALCIUM 7.6* 7.6*  --  7.6*   PROT 4.4* 4.8*  --  5.0*   BILITOT  3.1* 3.7*  --  4.1*   ALKPHOS 86 90  --  94   ALT 11 12  --  14   AST 27 28  --  27   MG 1.6 1.6 1.6  --     No results for input(s): PH, PCO2, PO2, HCO3, POCSATURATED, BE in the last 168 hours.       POCT Glucose: HbA1c:    Recent Labs   Lab 07/26/23  1225 07/26/23  1623 07/27/23  0021 07/27/23  0612 07/27/23  1127 07/27/23  1532   POCTGLUCOSE 98 149* 121* 98 268* 173*    Hemoglobin A1C   Date Value Ref Range Status   05/29/2023 <4.0 (A) 4.0 - 5.6 % Final     Comment:     ADA Screening Guidelines:  5.7-6.4%  Consistent with prediabetes  >or=6.5%  Consistent with diabetes    High levels of fetal hemoglobin interfere with the HbA1C  assay. Heterozygous hemoglobin variants (HbS, HgC, etc)do  not significantly interfere with this assay.   However, presence of multiple variants may affect accuracy.  Falsely low HA1c results may be observed in patients   with clinical conditions that shorten erythrocyte   life span or decrease mean erythrocyte age.  HA1c   may not accurately reflect glycemic control when   clinical conditions that affect erythrocyte survival   are present. Fructosamine may be used as an alternate  measurement of glycemic control.     01/22/2021 4.1 4.0 - 5.6 % Final     Comment:     ADA Screening Guidelines:  5.7-6.4%  Consistent with prediabetes  >or=6.5%  Consistent with diabetes  High levels of fetal hemoglobin interfere with the HbA1C  assay. Heterozygous hemoglobin variants (HbS, HgC, etc)do  not significantly interfere with this assay.   However, presence of multiple variants may affect accuracy.     12/10/2020 4.6 4.0 - 5.6 % Final     Comment:     ADA Screening Guidelines:  5.7-6.4%  Consistent with prediabetes  >or=6.5%  Consistent with diabetes  High levels of fetal hemoglobin interfere with the HbA1C  assay. Heterozygous hemoglobin variants (HbS, HgC, etc)do  not significantly interfere with this assay.   However, presence of multiple variants may affect accuracy.          Cardiac Enzymes:  Ejection Fractions:    No results for input(s): CPK, CPKMB, MB, TROPONINI in the last 72 hours. EF   Date Value Ref Range Status   07/16/2023 65 % Final   06/03/2023 70 % Final          No results for input(s): COLORU, APPEARANCEUA, PHUR, SPECGRAV, PROTEINUA, GLUCUA, KETONESU, BILIRUBINUA, OCCULTUA, NITRITE, UROBILINOGEN, LEUKOCYTESUR, RBCUA, WBCUA, BACTERIA, SQUAMEPITHEL, HYALINECASTS in the last 48 hours.    Invalid input(s): WRIGHTSUR      Procalcitonin (ng/mL)   Date Value   06/16/2023 0.26 (H)     Lactate (Lactic Acid) (mmol/L)   Date Value   07/16/2023 1.4   07/16/2023 1.4   07/06/2023 1.5   07/05/2023 8.0 (HH)   06/11/2023 6.2 (HH)     BNP (pg/mL)   Date Value   07/16/2023 174 (H)     No results found for: CRP, SEDRATE  D-Dimer (mg/L FEU)   Date Value   07/07/2023 2.22 (H)     Ferritin (ng/mL)   Date Value   05/18/2023 110   10/23/2015 412 (H)   09/19/2015 599 (H)   09/19/2015 599 (H)   07/23/2015 551 (H)   06/17/2015 612 (H)     LD (U/L)   Date Value   06/05/2023 249   05/31/2023 426 (H)   09/19/2015 280 (H)     Troponin I (ng/mL)   Date Value   06/13/2023 0.029 (H)   06/13/2023 0.030 (H)   05/18/2023 0.035 (H)   05/18/2023 0.037 (H)   05/05/2023 <0.006   01/25/2023 <0.006     CPK (U/L)   Date Value   05/05/2023 47   01/25/2023 27   06/14/2020 23 (L)     CK (U/L)   Date Value   04/15/2023 98     Results for orders placed or performed during the hospital encounter of 05/18/23   Vitamin D   Result Value Ref Range    Vit D, 25-Hydroxy 15 (L) 30 - 96 ng/mL     SARS-CoV2 (COVID-19) Qualitative PCR (no units)   Date Value   07/26/2023 Not Detected   06/30/2023 Not Detected   02/14/2023 Not Detected     SARS-CoV-2 RNA, Amplification, Qual (no units)   Date Value   05/28/2023 Negative   11/02/2021 Negative   01/21/2021 Negative   01/11/2021 Negative   11/21/2020 Negative   06/16/2020 Negative       Microbiology labs for the last week  Microbiology Results (last 7 days)       ** No results found for the last 168  hours. **            Reviewed and noted in plan where applicable- Please see chart for full lab data.    Lines/Drains:       Peripheral IV - Single Lumen 07/12/23 1148 20 G;1 3/4 in Left Forearm (Active)   Site Assessment Clean;Dry;Intact 07/16/23 0701   Extremity Assessment Distal to IV No abnormal discoloration 07/16/23 0701   Line Status No blood return;Flushed;Saline locked 07/16/23 0701   Dressing Status Dry;Clean;Intact 07/16/23 0701   Dressing Intervention Integrity maintained 07/16/23 0701   Dressing Change Due 07/16/23 07/16/23 0701   Site Change Due 07/16/23 07/16/23 0701   Reason Not Rotated Not due 07/16/23 0701   Number of days: 3            Peripheral IV - Single Lumen 07/14/23 1540 20 G;1 3/4 in Right Upper Arm (Active)   Site Assessment Intact;Clean;Dry 07/16/23 0701   Extremity Assessment Distal to IV No abnormal discoloration 07/16/23 0701   Line Status Blood return noted;Flushed;Saline locked 07/16/23 0701   Dressing Status Dry;Intact;Clean 07/16/23 0701   Dressing Intervention Integrity maintained 07/16/23 0701   Dressing Change Due 07/18/23 07/16/23 0701   Site Change Due 07/18/23 07/16/23 0701   Reason Not Rotated Not due 07/16/23 0701   Number of days: 1            NG/OG Tube 07/13/23 1311 [place] sump 18 Fr. Right nostril (Active)   Placement Check placement verified by aspirate characteristics 07/16/23 0701   Tolerance no signs/symptoms of discomfort 07/16/23 0701   Securement secured to nostril center w/ adhesive device 07/16/23 0701   Clamp Status/Tolerance unclamped 07/16/23 0701   Suction Setting/Drainage Method suction at the bedside 07/16/23 0701   Insertion Site Appearance no redness, warmth, tenderness, skin breakdown, drainage 07/16/23 0701   Flush/Irrigation flushed w/;water 07/16/23 0502   Feeding Type continuous;by pump 07/16/23 0701   Feeding Action feeding continued 07/16/23 0701   Current Rate (mL/hr) 50 mL/hr 07/16/23 0701   Goal Rate (mL/hr) 50 mL/hr 07/16/23 0701   Intake  "(mL) 20 mL 07/15/23 0800   Water Bolus (mL) 250 mL 07/16/23 0501   Rate Formula Tube Feeding (mL/hr) 20 mL/hr 07/14/23 0400   Formula Name Isosource 1.5 07/16/23 0701   Intake (mL) - Formula Tube Feeding 50 07/16/23 0700   Residual Amount (ml) 30 ml 07/15/23 2337   Number of days: 2            Urethral Catheter 07/05/23 1332 Double-lumen (Active)   Site Assessment Clean;Intact 07/16/23 0701   Collection Container Urimeter 07/16/23 0701   Securement Method secured to top of thigh w/ adhesive device 07/16/23 0701   Catheter Care Performed yes 07/16/23 0701   Reason for Continuing Urinary Catheterization Non-healing sacral/perineal wound 07/16/23 0701   CAUTI Prevention Bundle Securement Device in place with 1" slack;Intact seal between catheter & drainage tubing;Drainage bag/urimeter off the floor;Sheeting clip in use;No dependent loops or kinks;Drainage bag/urimeter not overfilled (<2/3 full);Drainage bag/urimeter below bladder 07/16/23 0701   Output (mL) 75 mL 07/16/23 0738   Number of days: 10            Fecal Incontinence  07/09/23 2300 (Active)   $ Fecal Management Supplies Fecal Management System (Supply) 07/14/23 1954   Application fecal incontinence  in place 07/16/23 0701   Drainage Method attached to drainage bag 07/16/23 0701   Securement to gravity 07/16/23 0701   Skin cleansed, skin barrier applied 07/16/23 0701   Tolerance no signs/symptoms of discomfort 07/16/23 0701   Stool (mL) 200 mL 07/16/23 0501   Number of days: 6       Imaging  ECG Results              EKG 12-lead (Final result)  Result time 07/05/23 13:56:05      Final result by Interface, Lab In [facility] (07/05/23 13:56:05)               Narrative:    Test Reason : E87.5,    Vent. Rate : 103 BPM     Atrial Rate : 103 BPM     P-R Int : 132 ms          QRS Dur : 076 ms      QT Int : 342 ms       P-R-T Axes : 073 054 084 degrees     QTc Int : 448 ms    Age and gender specific analysis  Sinus tachycardia  Low voltage QRS  Low " anterior forces and R wave progression  Possibly acute STEMI    ACUTE MI / STEMI    Abnormal ECG  When compared with ECG of 05-JUL-2023 11:07,  No significant change was found  Confirmed by Abimael CLEMENTS MD ([id number]) on 7/5/2023 1:55:55 PM    Referred By: [email or web address]   SELF           Confirmed By:Abimael CLEMENTS MD                                   EKG 12-lead (Final result)  Result time 07/05/23 14:01:06      Final result by Interface, Lab In [facility] (07/05/23 14:01:06)               Narrative:    Test Reason : K92.2,    Vent. Rate : 096 BPM     Atrial Rate : 096 BPM     P-R Int : 114 ms          QRS Dur : 066 ms      QT Int : 352 ms       P-R-T Axes : 078 090 065 degrees     QTc Int : 444 ms    Normal sinus rhythm  Vertical axis  Otherwise normal ECG  When compared with ECG of 27-JUN-2023 12:05,  T wave inversion no longer evident in Anterior leads  Confirmed by Cameron Hodge MD ([id number]) on 7/5/2023 2:01:00 PM    Referred By: System System           Confirmed By:Cameron Hodge MD                                  Results for orders placed during the hospital encounter of 05/28/23    Echo    Interpretation Summary  · The left ventricle is normal in size with hyperdynamic systolic function. The estimated ejection fraction is 70%.  · Normal right ventricular size with normal right ventricular systolic function.  · Normal left ventricular diastolic function.  · Mild left atrial enlargement.  · Mechanically ventilated; cannot use inferior caval vein diameter to estimate central venous pressure.  · Trivial pericardial effusion.  · There is a left pleural effusion.      X-Ray Chest AP Portable  Narrative: EXAMINATION:  XR CHEST AP PORTABLE    CLINICAL HISTORY:  Fever;    TECHNIQUE:  Single frontal view of the chest was performed.    COMPARISON:  No 07/16/2023 ne    FINDINGS:  Mild cardiomegaly.  Mild edema.  Small ill-defined opacities noted adjacent to the left hilum similar to the previous study.  Opacification at the left lung base  probably atelectatic changes and  smaller amount of pleural effusion.  Impression: No significant changes    Electronically signed by: Americo Graf MD  Date:    07/25/2023  Time:    12:17      Labs, Imaging, EKG and Diagnostic results from 7/27/2023 were reviewed.    Medications:  Medication list was reviewed and changes noted under Assessment/Plan.  No current facility-administered medications on file prior to encounter.     Current Outpatient Medications on File Prior to Encounter   Medication Sig Dispense Refill    ARIPiprazole (ABILIFY) 20 MG Tab Take 5 mg by mouth once daily.      folic acid (FOLVITE) 1 MG tablet Take 1 tablet (1 mg total) by mouth once daily. (Patient taking differently: Take 1 mg by mouth every evening.) 30 tablet 2    furosemide (LASIX) 20 MG tablet Take 20 mg by mouth once daily.      lactulose (CHRONULAC) 10 gram/15 mL solution Take 10 g by mouth 3 (three) times daily.      lithium carbonate 150 MG capsule Take 1 capsule (150 mg total) by mouth every evening. (Patient taking differently: Take 150 mg by mouth 2 (two) times a day.) 30 capsule 11    magnesium oxide (MAG-OX) 400 mg (241.3 mg magnesium) tablet Take by mouth once daily.      pantoprazole (PROTONIX) 40 MG tablet Take 40 mg by mouth once daily.      propranoloL (INDERAL) 40 MG tablet Take 40 mg by mouth once daily.      QUEtiapine (SEROQUEL) 300 MG Tab Take 100 mg by mouth every evening.      sodium bicarbonate 650 MG tablet Take 650 mg by mouth Daily.      spironolactone (ALDACTONE) 50 MG tablet Take 1 tablet (50 mg total) by mouth once daily. 90 tablet 3    zonisamide (ZONEGRAN) 100 MG Cap Take 100 mg by mouth once daily.      levETIRAcetam (KEPPRA) 1000 MG tablet Take 1,000 mg by mouth 2 (two) times daily.      OLANZapine (ZYPREXA) 10 MG tablet Take 1 tablet (10 mg total) by mouth every evening. (Patient not taking: Reported on 7/6/2023) 30 tablet 11    rifAXIMin (XIFAXAN) 550 mg Tab Take 1 tablet (550 mg total) by mouth 2  (two) times daily. (Patient not taking: Reported on 7/6/2023) 180 tablet 3     Scheduled Medications:  albuterol-ipratropium, 3 mL, Nebulization, Q6H WAKE  lactulose, 10 g, Oral, TID  levetiracetam, 1,000 mg, Oral, BID  OLANZapine, 5 mg, Oral, QHS  pantoprazole, 40 mg, Oral, BID      PRN: dextrose 10%, dextrose 10%, glucagon (human recombinant), insulin aspart U-100, oxyCODONE, sodium chloride 0.9%  Infusions:       Estimated Creatinine Clearance: 178 mL/min (A) (based on SCr of 0.3 mg/dL (L)).    Assessment/Plan:      * Gastrointestinal hemorrhage associated with duodenal ulcer    as  above        Urinary retention  7/27  2 episodes. s/p straight cath       Irritant contact dermatitis due to fecal, urinary or dual incontinence  wound care eval      Dysphagia  7/18  MBS today -started puree nectar thick liquids.        Hypoxia   7/16 sats 92% on 5L of NC.  CX ray - Detrimental changes since the previous examination including interval increase in pulmonary vascularity and bilateral diffuse interstitial pulmonary parenchymal opacification, progression of abnormal opacification at the left lung base, and development of small right-sided pleural effusion.   findings would be consistent with congestive heart failure.PT/OT recs SNF. BNP , procalcitonin and Echo.   7/27 Sats 97% on RA    H. pylori infection    positive serology for H pylori, begin triple therapy with clarithromycin,amoxicillin, and PPI for 14 days       Hematochezia    - GI EGD clipped duodenal ulcers for IR reference  - Transfuse blood products for active bleeding   - trend CBC and follow  - IR embolized GDA for duodenal hyperemia   -- GI re evaluated site of duodenal ulcers, no noted active bleeding  -- Continue PPI BID for 8 weeks, then once daily after that  --Pt had positive serology for H pylori, had triple therapy with clarithromycin,amoxicillin, and PPI for 14 days      Acute blood loss anemia     - Maintain IV access with 2 large bore Ivs  -  Intravascular resuscitation/support with IVFs   - Hold all NSAIDs and anticoagulants, unless contraindicated.  - Please correct any coagulopathy with platelets and FFP for goal of platelets >50K and INR <2.0  - Please notify GI team if there is significant change in patient's clinical status  - To date, patient has required at least 21 units of pRBCs to maintain Hgb >7     7/16     Patient's with macrocytic anemia.. Hemoglobin stable. Etiology likely due to acute blood loss.  Current CBC reviewed-    Recent Labs   Lab 07/24/23  0513 07/25/23  0248 07/26/23  0519   HGB 8.0* 7.6* 8.1*         Component Value Date/Time     (H) 07/26/2023 0519    RDW SEE COMMENT 07/26/2023 0519    IRON 132 05/18/2023 1527    FERRITIN 110 05/18/2023 1527    RETIC 3.9 (H) 06/05/2023 0935    FOLATE 11.5 07/17/2023 0619    [id number] 07/17/2023 0619    OCCULTBLOOD Negative 09/19/2015 0137     Monitor CBC and transfuse if H/H drops below 7/21.        Retroperitoneal bleed  Retroperitoneum: No significant adenopathy.  Similar sized left retroperitoneal hematoma measuring 11 x 9 cm (series 5, image 100; previously 11 x 9 cm).  The left iliacus collection also measures similar to prior at 5.4 cm (series 5, image 127; previously 5.6 cm).  Layering hyperdensity within these collections suggesting different ages in blood products.  No significant volume active extravasation into these collections  - CT-A demonstrated stable retroperitoneal hematoma. No need for intervention at this time.          Supratherapeutic INR  patient with INR   Recent Labs   Lab 07/21/23  0507 07/23/23  0434   INR 1.4* 1.5*   . INR is supratherapeutic. secondary to coagulopathy from liver disease.monitor      Hypokalemia  replaced       UTI (urinary tract infection)  7/17 Saldaña removed. UA +. UC pending. started on IV ceftriaxone.  7/18 UC -YEAST 10,000 - 49,999 cfu/ml Identification pending No other significant isolate    Bipolar 1 disorder     - Consult psych  regarding Bipolar 1 disorder history  7/27 Sister wanted her started back on lithium but psychiatry recommended  increasing Zyprexa 7.5mg QHS. EKG to monitor Qtc.Discontinue zyprexa if Qtc >480.  EKG QTC 493ms.  Zyprexa Dced        Hepatic encephalopathy   ammonia 190s on admit -->90s . AAOX 1. no lactulose give due to GI bleed  repeat ammonia. Hepatolog eval  with     MELD 3.0: 19 at 7/25/2023  2:48 AM  MELD-Na: 15 at 7/25/2023  2:48 AM  Calculated from:  Serum Creatinine: 0.3 mg/dL (Using min of 1 mg/dL) at 7/25/2023  2:48 AM  Serum Sodium: 143 mmol/L (Using max of 137 mmol/L) at 7/25/2023  2:48 AM  Total Bilirubin: 3.1 mg/dL at 7/25/2023  2:48 AM  Serum Albumin: 1.8 g/dL at 7/25/2023  2:48 AM  INR(ratio): 1.5 at 7/23/2023  4:34 AM  Age at listing (hypothetical): 58 years  Sex: Female at 7/25/2023  2:48 AM  7/16 Ammonia 48 WNL. AAOX 1. Hepatology consulted for cirrhosis.    7/17  Hepatology eval. resumed lactulose.  7/18  hydroxyzine  PRN  discontinued  due to it not being safe in delirium    Severe protein-calorie malnutrition  Nutrition consulted. Most recent weight and BMI monitored-     Measurements:  Wt Readings from Last 1 Encounters:   07/27/23 61.2 kg (135 lb)   Body mass index is 24.69 kg/m².    Patient has been screened and assessed by RD.    Malnutrition Type:  Context: social/environmental circumstances  Level: severe    Malnutrition Characteristic Summary:  Energy Intake (Malnutrition): less than or equal to 75% for greater than or equal to 1 month  Subcutaneous Fat (Malnutrition): severe depletion  Muscle Mass (Malnutrition): severe depletion      History of seizure  on Keppra       Thrombocytopenia  with cirrhosis   Patient with thrombocytopenia   Recent Labs   Lab 07/24/23  0513 07/25/23  0248 07/26/23  0519   PLT 53* 46* 51*   . Platelet counts stable  .monitor      Cirrhosis, Laennec's  alcoholic cirrhosis  MELD 3.0: 19 at 7/25/2023  2:48 AM  MELD-Na: 15 at 7/25/2023  2:48 AM  Calculated  from:  Serum Creatinine: 0.3 mg/dL (Using min of 1 mg/dL) at 7/25/2023  2:48 AM  Serum Sodium: 143 mmol/L (Using max of 137 mmol/L) at 7/25/2023  2:48 AM  Total Bilirubin: 3.1 mg/dL at 7/25/2023  2:48 AM  Serum Albumin: 1.8 g/dL at 7/25/2023  2:48 AM  INR(ratio): 1.5 at 7/23/2023  4:34 AM  Age at listing (hypothetical): 58 years  Sex: Female at 7/25/2023  2:48 AM     No results for input(s): AMMONIA in the last 168 hours.  Strict input /output monitor, daily weights        VTE Risk Mitigation (From admission, onward)           Ordered     Place sequential compression device  Until discontinued         07/14/23 1024     Reason for No Pharmacological VTE Prophylaxis  Once        Question:  Reasons:  Answer:  Active Bleeding    07/05/23 1334     IP VTE HIGH RISK PATIENT  Once         07/05/23 1334                    Discharge Planning   BRAYAN: 7/28/2023     Code Status: DNR   Is the patient medically ready for discharge?: No    Reason for patient still in hospital (select all that apply): Treatment  Discharge Plan A: Skilled Nursing Facility   Discharge Delays: None known at this time              Seth Noyola MD  Department of Hospital Medicine   [facility] - Telemetry Stepdown

## 2023-07-27 NOTE — ASSESSMENT & PLAN NOTE
Nutrition consulted. Most recent weight and BMI monitored-     Measurements:  Wt Readings from Last 1 Encounters:   07/27/23 61.2 kg (135 lb)   Body mass index is 24.69 kg/m².    Patient has been screened and assessed by RD.    Malnutrition Type:  Context: social/environmental circumstances  Level: severe    Malnutrition Characteristic Summary:  Energy Intake (Malnutrition): less than or equal to 75% for greater than or equal to 1 month  Subcutaneous Fat (Malnutrition): severe depletion  Muscle Mass (Malnutrition): severe depletion

## 2023-07-27 NOTE — ASSESSMENT & PLAN NOTE
alcoholic cirrhosis  MELD 3.0: 19 at 7/25/2023  2:48 AM  MELD-Na: 15 at 7/25/2023  2:48 AM  Calculated from:  Serum Creatinine: 0.3 mg/dL (Using min of 1 mg/dL) at 7/25/2023  2:48 AM  Serum Sodium: 143 mmol/L (Using max of 137 mmol/L) at 7/25/2023  2:48 AM  Total Bilirubin: 3.1 mg/dL at 7/25/2023  2:48 AM  Serum Albumin: 1.8 g/dL at 7/25/2023  2:48 AM  INR(ratio): 1.5 at 7/23/2023  4:34 AM  Age at listing (hypothetical): 58 years  Sex: Female at 7/25/2023  2:48 AM     No results for input(s): AMMONIA in the last 168 hours.  Strict input /output monitor, daily weights

## 2023-07-27 NOTE — ASSESSMENT & PLAN NOTE
- GI EGD clipped duodenal ulcers for IR reference  - Transfuse blood products for active bleeding   - trend CBC and follow  - IR embolized GDA for duodenal hyperemia   -- GI re evaluated site of duodenal ulcers, no noted active bleeding  -- Continue PPI BID for 8 weeks, then once daily after that  --Pt had positive serology for H pylori, had triple therapy with clarithromycin,amoxicillin, and PPI for 14 days

## 2023-07-27 NOTE — ASSESSMENT & PLAN NOTE
- Consult psych regarding Bipolar 1 disorder history  7/27 Sister wanted her started back on lithium but psychiatry recommended  increasing Zyprexa 7.5mg QHS. EKG to monitor Qtc.Discontinue zyprexa if Qtc >480.  EKG QTC 493ms.  Zyprexa swtiched to 5mg HS  7/28  started  lithium 300 mg  nightly due to concern for manic episode

## 2023-07-27 NOTE — ASSESSMENT & PLAN NOTE
ammonia 190s on admit -->90s . AAOX 1. no lactulose give due to GI bleed  repeat ammonia. Hepatolog eval  with     MELD 3.0: 19 at 7/25/2023  2:48 AM  MELD-Na: 15 at 7/25/2023  2:48 AM  Calculated from:  Serum Creatinine: 0.3 mg/dL (Using min of 1 mg/dL) at 7/25/2023  2:48 AM  Serum Sodium: 143 mmol/L (Using max of 137 mmol/L) at 7/25/2023  2:48 AM  Total Bilirubin: 3.1 mg/dL at 7/25/2023  2:48 AM  Serum Albumin: 1.8 g/dL at 7/25/2023  2:48 AM  INR(ratio): 1.5 at 7/23/2023  4:34 AM  Age at listing (hypothetical): 58 years  Sex: Female at 7/25/2023  2:48 AM  7/16 Ammonia 48 WNL. AAOX 1. Hepatology consulted for cirrhosis.    7/17  Hepatology eval. resumed lactulose.  7/18  hydroxyzine  PRN  discontinued  due to it not being safe in delirium  7/29 ammonia elevated at 70. lactulose increased to 30g TID.  started rifaximin BID

## 2023-07-27 NOTE — PROGRESS NOTES
Pharmacist Intervention IV to PO Note    Marely Hamilton is a 58 y.o. female being treated with IV pantoprazole    Patient Data:    Vital Signs (Most Recent):  Temp: 97.9 °F (36.6 °C) (07/27/23 0454)  Pulse: 96 (07/27/23 0454)  Resp: 17 (07/27/23 0454)  BP: 131/61 (07/27/23 0454)  SpO2: 97 % (07/27/23 0454) Vital Signs (72h Range):  Temp:  [97.9 °F (36.6 °C)-100.4 °F (38 °C)]   Pulse:  []   Resp:  [16-22]   BP: (117-194)/()   SpO2:  [89 %-100 %]      CBC:  Recent Labs   Lab 07/24/23  0513 07/25/23  0248 07/26/23  0519   WBC 2.95* 2.76* 2.52*   RBC 2.51* 2.33* 2.48*   HGB 8.0* 7.6* 8.1*   HCT 25.7* 24.4* 25.5*   PLT 53* 46* 51*   * 105* 103*   MCH 31.9* 32.6* 32.7*   MCHC 31.1* 31.1* 31.8*     CMP:     Recent Labs   Lab 07/24/23  0513 07/25/23  0248 07/26/23  0519   GLU 89 100 94   CALCIUM 7.7* 7.6* 7.6*   ALBUMIN 2.0* 1.8* 2.0*   PROT 4.9* 4.4* 4.8*    143 143   K 3.8 3.5 3.1*   CO2 19* 17* 18*   * 118* 116*   BUN 6 5* 5*   CREATININE 0.3* 0.3* 0.4*   ALKPHOS 100 86 90   ALT 14 11 12   AST 31 27 28   BILITOT 3.2* 3.1* 3.7*       Dietary Orders:  Diet Orders            Diet Dysphagia Pureed (IDDSI Level 4) Nectar Thick: Dysphagia 1 (Pureed) starting at 07/18 1821            Based on the following criteria, this patient qualifies for intravenous to oral conversion:  [x] The patients gastrointestinal tract is functioning (tolerating medications via oral or enteral route for 24 hours and tolerating food or enteral feeds for 24 hours).  [x] The patient is hemodynamically stable for 24 hours (heart rate <100 beats per minute, systolic blood pressure >99 mm Hg, and respiratory rate <20 breaths per minute).  [x] The patient shows clinical improvement (afebrile for at least 24 hours and white blood cell count downtrending or normalized). Additionally, the patient must be non-neutropenic (absolute neutrophil count >500 cells/mm3).  [x] For antimicrobials, the patient has received IV therapy  for at least 24 hours.    Pantoprazole changed to 40 mg PO BID (oral solution)    Sandy Kemp.D., BCPS  [id number]

## 2023-07-27 NOTE — PLAN OF CARE
Recommendations     Modify diet to Pureed w/ thin liquids (per SLP 7/26).   Add Boost Plus ONS TID & encourage PO intake as tolerated.  RD to monitor & follow-up.     Goals: Will meet % EEN/EPN by next RD f/u.  Nutrition Goal Status: progressing towards goal  Communication of RD Recs: reviewed with RN

## 2023-07-27 NOTE — ASSESSMENT & PLAN NOTE
7/18  MBS today -started puree nectar thick liquids.    7/29 SLP recs - Mechanical soft, Thin  liquids

## 2023-07-27 NOTE — PROGRESS NOTES
CONSULTATION LIAISON PSYCHIATRY PROGRESS NOTE    Patient Name: Marely Hamilton  MRN: [id number]  Patient Class: IP- Inpatient  Admission Date: 7/5/2023  Attending Physician: Seth Noyola MD      SUBJECTIVE:     Marely Hamilton is a 58 y.o. female with past psychiatric history of bipolar disorder, alcohol use disorder & past pertinent medical history of seizure disorder, alcohol induced hepatic cirrhosis presents to the ED/admitted to the hospital for Gastrointestinal hemorrhage associated with duodenal ulcer. Patient presented from SNF (when she experienced alida blood per rectum per chart review), for which she was recently discharged to when she presented to the hospital on for hepatic encephalopathy c/b retroperitoneal bleed (s/p ICV filter and IR embolization).         Psychiatry consulted for medication management      Today, patient was lying in bed watching TV. She eagerly engaged. She was oriented to person, place, and year. Patient more conversational today, but conversation was disorganized with loose associations. Patient stated that she slept well last night. She was pleasant and polite. She made multiple statements that were illogical but she was easily redirected. Patient not currently exhibiting manic behavior.     Interval Events: Increased zyprexa dose to 7.5 mg overnight after pt presented with increased visual hallucinations. Morning EKG with prolonged Qtc this AM. Zyprexa reduced back to 5 mg nightly.         OBJECTIVE:    Mental Status Exam:  General Appearance: dressed in hospital garb, lying in bed, appears older than stated age, cachetic, thin and gaunt  Behavior: cooperative, friendly, pleasant, polite, appropriate eye-contact, under good behavioral control  Involuntary Movements and Motor Activity: no abnormal involuntary movements noted  Gait and Station: unable to assess - patient lying down or seated  Speech and Language: conversational, spontaneous, coherent, soft,  "interruptible  Mood: "good"  Affect: calm  Thought Process and Associations: scattered, +loosening of associations  Thought Content and Perceptions:: + delusions, no auditory hallucinations, no command hallucinations  Sensorium and Orientation: oriented partially to time, oriented to year, oriented to person  Recent and Remote Memory: impaired, forgetful  Attention and Concentration: attentive to conversation  Fund of Knowledge: Unable to Formally Assess  Insight: impaired  Judgment: impaired          ASSESSMENT & RECOMMENDATIONS    Bipolar most recent manic      PSYCH MEDICATIONS  Scheduled: Recommend restarting zyprexa 5 mg nightly  Will not restart lithium at this time. Patient with a history of hypercalcemia 2/2 lithium toxicity. Will continue maintenance therapy with zyprexa.  Does not appear to be requiring PRNs at this time, in future if patient does become agitated recommend Zyprexa 5 mg PO/IM.   Monitor QTc with daily EKGs while on zyprexa. Recommend Repeat EKG to monitor Qtc     DELIRIUM PRECAUTION BEHAVIOR MANAGEMENT  PLEASE utilize CHEMICAL restraints with PRN meds first for agitation. Minimize use of PHYSICAL restraints OR have periods of being out of physical restraints if possible.  Keep window shades open and room lit during day and room dim at night in order to promote normal sleep-wake cycles  Encourage family at bedside. [place] patient often to situation, location, date.  Continue to Limit or Discontinue use of Narcotics, Benzos and Anti-cholinergic medications as they may worsen delirium.  Continue medical workup for causative etiology of Delirium.      RISK ASSESSMENT  NO NEED FOR PEC patient NOT in any imminent danger of hurting self or others and not gravely disabled.      FOLLOW UP  Will follow up while in house     DISPOSITION - once medically cleared:   Defer to medical team        Please contact ON CALL psychiatry service (24/7) for any acute issues that may arise.    Dr. Mehdi Aguilera  CL " Psychiatry  [facility]  7/27/2023 9:59 AM       I , Deshawn Crain MD, have reviewed the attached history and exam . Pt examined personally by me today . The case discussed with the examining psychiatry resident physician . I agree the assessment and recommended treatment plan .   --------------------------------------------------------------------------------------------------------------------------------------------------------------------------------------------------------------------------------------    CONTINUED OBJECTIVE clinical data & findings reviewed and noted for above decision making    Current Medications:   Scheduled Meds:    acetylcysteine 200 mg/ml (20%)  2 mL Nebulization TID WAKE    albuterol-ipratropium  3 mL Nebulization Q6H WAKE    lactulose  10 g Per NG tube TID    levetiracetam  1,000 mg Per NG tube BID    pantoprazole  40 mg Oral BID     PRN Meds: dextrose 10%, dextrose 10%, glucagon (human recombinant), insulin aspart U-100, oxyCODONE, sodium chloride 0.9%    Allergies:   Review of patient's allergies indicates:   Allergen Reactions    Sulfa (sulfonamide antibiotics) Rash    Codeine Nausea And Vomiting       Vitals  Vitals:    07/27/23 0832   BP:    Pulse: 88   Resp: 18   Temp:        Labs/Imaging/Studies:  Recent Results (from the past 24 hour(s))   POCT glucose    Collection Time: 07/26/23 12:25 PM   Result Value Ref Range    POCT Glucose 98 70 - 110 mg/dL   COVID-19 Routine Screening Extended Care Placement    Collection Time: 07/26/23  2:10 PM   Result Value Ref Range    SARS-CoV2 (COVID-19) Qualitative PCR Not Detected Not Detected   Calcium, ionized    Collection Time: 07/26/23  3:49 PM   Result Value Ref Range    Ionized Calcium 1.19 1.06 - 1.42 mmol/L   POCT glucose    Collection Time: 07/26/23  4:23 PM   Result Value Ref Range    POCT Glucose 149 (H) 70 - 110 mg/dL   POCT glucose    Collection Time: 07/27/23 12:21 AM   Result Value Ref Range    POCT Glucose 121  (H) 70 - 110 mg/dL   Magnesium    Collection Time: 07/27/23  6:01 AM   Result Value Ref Range    Magnesium 1.6 1.6 - 2.6 mg/dL   APTT    Collection Time: 07/27/23  6:02 AM   Result Value Ref Range    aPTT 35.0 (H) 21.0 - 32.0 sec   Calcium, ionized    Collection Time: 07/27/23  6:02 AM   Result Value Ref Range    Ionized Calcium 1.18 1.06 - 1.42 mmol/L   CBC Auto Differential    Collection Time: 07/27/23  6:02 AM   Result Value Ref Range    WBC 2.16 (L) 3.90 - 12.70 K/uL    RBC 2.52 (L) 4.00 - 5.40 M/uL    Hemoglobin 8.4 (L) 12.0 - 16.0 g/dL    Hematocrit 26.6 (L) 37.0 - 48.5 %     (H) 82 - 98 fL    MCH 33.3 (H) 27.0 - 31.0 pg    MCHC 31.6 (L) 32.0 - 36.0 g/dL    RDW SEE COMMENT 11.5 - 14.5 %    Platelets 62 (L) 150 - 450 K/uL    MPV 10.7 9.2 - 12.9 fL   Comprehensive Metabolic Panel    Collection Time: 07/27/23  6:02 AM   Result Value Ref Range    Sodium 142 136 - 145 mmol/L    Potassium 3.2 (L) 3.5 - 5.1 mmol/L    Chloride 115 (H) 95 - 110 mmol/L    CO2 17 (L) 23 - 29 mmol/L    Glucose 86 70 - 110 mg/dL    BUN 5 (L) 6 - 20 mg/dL    Creatinine 0.3 (L) 0.5 - 1.4 mg/dL    Calcium 7.6 (L) 8.7 - 10.5 mg/dL    Total Protein 5.0 (L) 6.0 - 8.4 g/dL    Albumin 2.0 (L) 3.5 - 5.2 g/dL    Total Bilirubin 4.1 (H) 0.1 - 1.0 mg/dL    Alkaline Phosphatase 94 55 - 135 U/L    AST 27 10 - 40 U/L    ALT 14 10 - 44 U/L    eGFR >60.0 >60 mL/min/1.73 m^2    Anion Gap 10 8 - 16 mmol/L   POCT glucose    Collection Time: 07/27/23  6:12 AM   Result Value Ref Range    POCT Glucose 98 70 - 110 mg/dL     Imaging Results              CTA Acute GI [place], Abdomen and Pelvis (Final result)  Result time 07/05/23 17:15:19      Final result by Mumtaz Garsia MD (07/05/23 17:15:19)                   Impression:      Images are degraded by significant streak and motion artifacts.    Stable large left retroperitoneal hematoma and left iliacus hematoma.  No contrast extravasation within the hematomas or bowel to indicate active arterial  bleed.    Hepatic cirrhosis.  Diffuse wall thickening of the stomach and colon, which can be seen in the setting of hepatic dysfunction.  However, superimposed inflammatory processes are not excluded.    Multiple, nondilated, distended fluid-filled loops of small bowel without a definite transition point.  Stool and air seen within the colon, this is suggestive of ileus.    Small left pleural effusion with associated compressive atelectasis.    Cholelithiasis.    Cardiomegaly.    Electronically signed by resident: Emiliano Mcmahon  Date:    07/05/2023  Time:    16:29    Electronically signed by: Mumtaz Garsia MD  Date:    07/05/2023  Time:    17:15               Narrative:    EXAMINATION:  CTA ACUTE GI BLEED, ABDOMEN AND PELVIS    CLINICAL HISTORY:  GI bleed;    TECHNIQUE:  Initial noncontrast  images were obtained of the abdomen and pelvis.  CT axial angiography images were then obtained from the lung bases to the pubic symphysis following the intravenous administration of 100 of Omnipaque 350 with delayed images obtained per GI bleeding protocol.  Sagittal and coronal reformats were provided.    COMPARISON:  CTA GI bleed 06/13/2023    FINDINGS:  Images are degraded by significant streak and motion artifacts.    Lungs: Interval decrease in size of the small right pleural effusion with associated compressive atelectasis.  Resolution of the left pleural effusion.    Heart: Enlarged.  No pericardial effusion.    Liver: Nodular contour of the liver.  No focal abnormality.    Gallbladder: Multiple calcified gallstones.  No pericholecystic inflammatory changes.    Bile ducts: No intrahepatic or extrahepatic biliary ductal dilatation.    Spleen: Normal size.    Pancreas: No mass. No ductal dilatation. No peripancreatic fat stranding.    Adrenals: No significant abnormality    Renal/Ureters: Bilateral cortical thinning.  No hydronephrosis.  Proximal ureters are normal caliber.  The distal ureters are difficult to  evaluate due to streak artifact.  Bladder is decompressed with Saldaña catheter in place.    Reproductive: Uterus appears without significant abnormality.  No adnexal mass, noting limited evaluation due to significant streak artifact.    Stomach/Bowel: Stomach is distended with ingested contents with thickened rugal folds.  Small bowel is distended with multiple nondilated fluid-filled loops.  No transition point.  Appendix is normal.  Diffuse wall thickening throughout the colon with mild stool burden.  Diffuse wall thickening of the rectum.  No contrast extravasation to indicate active arterial bleed.    Peritoneum: Stable large left retroperitoneal hematoma measuring 11.5 x 7.8 x 12.6 cm.  No contrast extravasation to indicate active bleed within the hematoma.  Additional smaller hematoma anterior to the left iliacus muscle, which appears stable compared to prior CTA.  No free fluid. No intraperitoneal free air.    Lymph Nodes: Multiple prominent mesenteric and retroperitoneal lymph nodes.    Vasculature: Abdominal aorta tapers normally.  Mild atherosclerosis of the abdominal aorta and its branches.    Bones: Bony mineralization is diminished.  Left hip arthroplasty.  Generalized body wall edema.    Soft Tissues: No significant abnormality.                                       CT Head Without Contrast (Final result)  Result time 07/05/23 15:10:53      Final result by Viktor Desouza MD (07/05/23 15:10:53)                   Impression:      No evidence of acute intracranial pathology.    Volume loss again identified.    Electronically signed by resident: Kenney Rosas  Date:    07/05/2023  Time:    14:45    Electronically signed by: Viktor Desouza  Date:    07/05/2023  Time:    15:10               Narrative:    EXAMINATION:  CT HEAD WITHOUT CONTRAST    CLINICAL HISTORY:  Mental status change, unknown cause;    TECHNIQUE:  Low dose axial CT images obtained throughout the head without the use of intravenous  contrast.  Axial, sagittal and coronal reconstructions were performed.    COMPARISON:  CT head 06/22/2023    FINDINGS:  Intracranial compartment:    Volume loss without evidence of hydrocephalus.    No parenchymal  hemorrhage, edema, mass effect or major vascular distribution infarct.    Decreased attenuation in the periventricular white matter is nonspecific but may reflect mild chronic small vessel ischemic change vs other encephalopathy.    No extra-axial blood or fluid collections.    Skull/extracranial contents (limited evaluation):    No displaced calvarial fracture.    The visualized sinuses and mastoid air cells are clear.                                       X-Ray Chest AP Portable (Final result)  Result time 07/05/23 13:09:11      Final result by Americo Reyes MD (07/05/23 13:09:11)                   Impression:      No significant detrimental interval change in the appearance of the chest since 06/25/2023 is appreciated, with significant improvement as noted above.  No post procedure pneumothorax.      Electronically signed by: Americo Reyes MD  Date:    07/05/2023  Time:    13:09               Narrative:    EXAMINATION:  XR CHEST AP PORTABLE    TECHNIQUE:  One view was obtained.  Note is made of the fact that the thorax is obscured to some extent by opacities external to the patient, particularly defibrillator pads.    COMPARISON:  Comparison is made to the most recent prior chest radiograph of 06/25/2023.    FINDINGS:  Endotracheal tube has been placed, its tip lying just superior to the apex of the aortic arch, well above the katharina.  Left jugular origin vascular catheter is now seen, its tip in the superior vena cava near the junction of the right and left brachiocephalic veins.  Allowing for magnification of the cardiomediastinal silhouette related to projection, the heart is not significantly enlarged.  Opacity in both inferior hemithoraces seen on the previous examination has resolved, consistent with  clearing of airspace consolidation in both mid/lower lung zones and resolution of previously present bilateral pleural fluid.  Lung zones are currently clear and free of significant airspace consolidation or volume loss.  No pleural fluid of any substantial volume is seen on either side.  No pneumothorax.

## 2023-07-27 NOTE — ASSESSMENT & PLAN NOTE
with cirrhosis   Patient with thrombocytopenia   Recent Labs   Lab 07/31/23  0444 08/01/23  0501 08/02/23  0504   PLT 58* 62* 60*   . Platelet counts stable  .monitor

## 2023-07-27 NOTE — ASSESSMENT & PLAN NOTE
7/16 sats 92% on 5L of NC.  CX ray - Detrimental changes since the previous examination including interval increase in pulmonary vascularity and bilateral diffuse interstitial pulmonary parenchymal opacification, progression of abnormal opacification at the left lung base, and development of small right-sided pleural effusion.   findings would be consistent with congestive heart failure.PT/OT recs SNF. BNP , procalcitonin and Echo.   7/27 Sats 97% on RA

## 2023-07-27 NOTE — PLAN OF CARE
[facility] - Telemetry Stepdown  Discharge Reassessment    Primary Care Provider: Viktor Ross MD    Expected Discharge Date: 7/28/2023    Reassessment (most recent)       Discharge Reassessment - 07/27/23 1148          Discharge Reassessment    Assessment Type Discharge Planning Reassessment     Discharge Plan discussed with: Sibling     Communicated BRAYAN with patient/caregiver Yes     Discharge Plan A Skilled Nursing Facility     Discharge Plan B Home with family;Home Health     DME Needed Upon Discharge  none     Transition of Care Barriers None     Why the patient remains in the hospital Requires continued medical care        Post-Acute Status    Post-Acute Authorization Placement     Post-Acute Placement Status Pending medical clearance/testing   [facility]- has auth and admission paperwork has been completed    Discharge Delays None known at this time                 Mirela Cintron, [facility]  [facility]  Ext. [phone]

## 2023-07-27 NOTE — PLAN OF CARE
"   [facility]     Department of Hospital Medicine     [address] [place] [address]     [phone] after hours  [phone] fax       NURSING HOME ORDERS    Patient Name: Marely Hamilton  YOB: [birth year]    Admit to Nursing Home: Skilled Bed                                             Diagnoses:  Active Hospital Problems    Diagnosis  POA    *Gastrointestinal hemorrhage associated with duodenal ulcer [K26.4]  Yes    Urinary retention [R33.9]  Yes    Dysphagia [R13.10]  Yes    Irritant contact dermatitis due to fecal, urinary or dual incontinence [L24.A2]  Yes    Hypoxia [R09.02]  Yes    H. pylori infection [A04.8]  Unknown     - Positive igG serology for H pylori  -- initiate triple therapy clarithromycin, amoxicillin, and PPI for 14 days due to recent duodenal ulcers.      Hematochezia [K92.1]  Yes     - Bolus IV pantoprazole 80mg followed by 40mg BID  - Octreotide bolus 50mcg, then gtt at 50mcg/hr.  - Ceftriaxone 1g IV Q daily for primary prophylaxis. Complete 5 day course  - GI consulted, Stabilize patient hemodynamically with transfusion. If pt is stable GI will scope   - Transfuse patient due to blood loss      Acute blood loss anemia [D62]  Yes    Retroperitoneal bleed [R58]  Yes    Supratherapeutic INR [R79.1]  Unknown    UTI (urinary tract infection) [N39.0]  Unknown    Hypokalemia [E87.6]  Yes    Bipolar 1 disorder [F31.9]  Yes    Severe protein-calorie malnutrition [E43]  Yes    Hepatic encephalopathy [K76.82]  Yes    Thrombocytopenia [D69.6]  Yes    Cirrhosis, Laennec's [K70.30]  Yes    History of seizure [Z87.898]  Yes     One seizure 2013- "low blood sugar from a starvation diet"        Resolved Hospital Problems    Diagnosis Date Resolved POA    Hypophosphatemia [E83.39] 07/16/2023 Yes       Patient is homebound due to:  Gastrointestinal hemorrhage associated with duodenal ulcer    Allergies:  Review of patient's allergies " indicates:   Allergen Reactions    Sulfa (sulfonamide antibiotics) Rash    Codeine Nausea And Vomiting       Vitals:        Every shift (Skilled Nursing patients)    Diet: Pureed nectar thick liquids    Acitivities:   - Up in a chair each morning as tolerated  - Ambulate with assistance to bathroom  - Scheduled walks once each shift (every 8 hours)  - May ambulate independently  - May use walker, cane, or self-propelled wheelchair       - Weight bearing: as tolerated      LABS:  Per facility protocol     Nursing Precautions:     - Aspiration precautions:             - Total assistance with meals            -  Upright 90 degrees befor during and after meals             -  Suction at bedside          - Fall precautions per nursing home protocol   - Seizure precaution per custodial protocol   - Decubitus precautions:        -  for positioning   - Pressure reducing foam mattress   - Turn patient every two hours. Use wedge pillows to anchor patient    CONSULTS:       Physical Therapy to evaluate and treat     Occupational Therapy to evaluate and treat     Speech Therapy  to evaluate and treat     Nutrition to evaluate and recommend diet     Psychiatry to evaluate and follow patients for delirium        MISCELLANEOUS CARE:       Routine Skin for Bedridden Patients:  Apply moisture barrier cream to all    skin folds and wet areas in perineal area daily and after baths and                           all bowel movements.    Wound care  - Sacrum, buttocks and perineum: bedside nursing to cleanse with coloplast wipes or soap and water, pat dry and apply triad BID and PRN       DIABETES CARE:    NA      Medications: Discontinue all previous medication orders, if any. See new list below.        Medication List        Start taking these medications      ciprofloxacin HCl 500 MG tablet  Commonly known as: CIPRO  Take 1 tablet (500 mg total) by mouth every 12 (twelve) hours. for 2 days     levetiracetam 500 mg/5 mL (5 mL)  Soln  Take 10 mLs (1,000 mg total) by mouth 2 (two) times daily.  Replaces: levETIRAcetam 1000 MG tablet     lithium 300 MG CR tablet  Commonly known as: LITHOBID  Take 1 tablet (300 mg total) by mouth every evening.  Replaces: lithium carbonate 150 MG capsule     pantoprazole 40 mg suspension  Commonly known as: PROTONIX  Take 1 packet (40 mg total) by mouth 2 (two) times daily.  Replaces: pantoprazole 40 MG tablet            Change how you take these medications      lactulose 20 gram/30 mL Soln  Commonly known as: CHRONULAC  Take 45 mLs (30 g total) by mouth 3 (three) times daily.  What changed:   medication strength  how much to take     OLANZapine 5 MG tablet  Commonly known as: ZyPREXA  Take 1 tablet (5 mg total) by mouth every evening.  What changed:   medication strength  how much to take            Continue taking these medications      rifAXIMin 550 mg Tab  Commonly known as: XIFAXAN  Take 1 tablet (550 mg total) by mouth 2 (two) times daily.            Stop taking these medications      ARIPiprazole 20 MG Tab  Commonly known as: ABILIFY     folic acid 1 MG tablet  Commonly known as: FOLVITE     furosemide 20 MG tablet  Commonly known as: LASIX     levETIRAcetam 1000 MG tablet  Commonly known as: KEPPRA  Replaced by: levetiracetam 500 mg/5 mL (5 mL) Soln     lithium carbonate 150 MG capsule  Replaced by: lithium 300 MG CR tablet     magnesium oxide 400 mg (241.3 mg magnesium) tablet  Commonly known as: MAG-OX     pantoprazole 40 MG tablet  Commonly known as: PROTONIX  Replaced by: pantoprazole 40 mg suspension     propranoloL 40 MG tablet  Commonly known as: INDERAL     QUEtiapine 300 MG Tab  Commonly known as: SEROQUEL     sodium bicarbonate 650 MG tablet     spironolactone 50 MG tablet  Commonly known as: ALDACTONE     zonisamide 100 MG Cap  Commonly known as: ZONEGRAN                 _________________________________  Seth Noyola MD  08/01/2023

## 2023-07-27 NOTE — TREATMENT PLAN
Hepatology Treatment Plan    Mareyl Haimlton is a 58 y.o. female admitted to hospital 7/5/2023 (Hospital Day: 23) due to Gastrointestinal hemorrhage associated with duodenal ulcer.     Interval History  Pending discharge to [facility].     Objective  Temp:  [97.7 °F (36.5 °C)-99.5 °F (37.5 °C)] 97.7 °F (36.5 °C) (07/27 1129)  Pulse:  [] 85 (07/27 1129)  BP: (121-194)/() 127/58 (07/27 1129)  Resp:  [16-20] 16 (07/27 1129)  SpO2:  [90 %-99 %] 97 % (07/27 1129)    Laboratory    Lab Results   Component Value Date    WBC 2.16 (L) 07/27/2023    HGB 8.4 (L) 07/27/2023    HCT 26.6 (L) 07/27/2023     (H) 07/27/2023    PLT 62 (L) 07/27/2023       Lab Results   Component Value Date     07/27/2023    K 3.2 (L) 07/27/2023     (H) 07/27/2023    CO2 17 (L) 07/27/2023    BUN 5 (L) 07/27/2023    CREATININE 0.3 (L) 07/27/2023    CALCIUM 7.6 (L) 07/27/2023       Lab Results   Component Value Date    ALBUMIN 2.0 (L) 07/27/2023    ALT 14 07/27/2023    AST 27 07/27/2023    GGT 33 06/17/2023    ALKPHOS 94 07/27/2023    BILITOT 4.1 (H) 07/27/2023       Lab Results   Component Value Date    INR 1.5 (H) 07/23/2023    INR 1.4 (H) 07/21/2023    INR 1.5 (H) 07/20/2023       MELD 3.0: 19 at 7/25/2023  2:48 AM  MELD-Na: 15 at 7/25/2023  2:48 AM  Calculated from:  Serum Creatinine: 0.3 mg/dL (Using min of 1 mg/dL) at 7/25/2023  2:48 AM  Serum Sodium: 143 mmol/L (Using max of 137 mmol/L) at 7/25/2023  2:48 AM  Total Bilirubin: 3.1 mg/dL at 7/25/2023  2:48 AM  Serum Albumin: 1.8 g/dL at 7/25/2023  2:48 AM  INR(ratio): 1.5 at 7/23/2023  4:34 AM  Age at listing (hypothetical): 58 years  Sex: Female at 7/25/2023  2:48 AM      Assessment    Marely Hamilton is a 58 y.o. female with EtOH cirrhosis, seizure disorder on AED's, Bipolar Disorder, DVT and IJ thrombus with recent admission for retroperitoneal hemorrhage requiring IR embolization and IVC filter placement who [place] [facility] [place]. Hepatology consulted given  the patient has cirrhosis with AMS.      Problem List:  EtOH cirrhosis   Acute Metabolic Encephalopathy  Bipolar Disorder  Delirium     Recommendations:     - Would continue home lactulose, if there are issues with encephalopathy, would add rifaximin   - PETH level <10  - Delirium precautions   - MELD 19, will consider outpatient transplant evaluation   - Pending discharge to SNF   - Please place ambulatory referral to hepatology clinic at discharge     Thank you for involving us in the care of Marely Hamilton. Please call with any additional concerns or questions.    Amari Santo DO   Gastroenterology Fellow PGY- IV  [facility]

## 2023-07-27 NOTE — NURSING
End of shift note    AAOx2  2L NC  Straight cath x1  BM x1  Turn q2  VSS  NADN- safety checks performed  Call light in reach

## 2023-07-27 NOTE — PROGRESS NOTES
[facility] - Telemetry Stepdown  Adult Nutrition  Progress Note    SUMMARY       Recommendations    Modify diet to Pureed w/ thin liquids (per SLP 7/26).   Add Boost Plus ONS TID & encourage PO intake as tolerated.  RD to monitor & follow-up.    Goals: Will meet % EEN/EPN by next RD f/u.  Nutrition Goal Status: progressing towards goal  Communication of RD Recs: reviewed with RN    Assessment and Plan    Severe protein-calorie malnutrition    Malnutrition Type:  Context: social/environmental circumstances  Level: severe    Related to (etiology):   Decline in ADL's, physiological cause    Signs and Symptoms (as evidenced by):   Findings of NFPE and poor PO intake    Malnutrition Characteristic Summary:  Energy Intake (Malnutrition): less than or equal to 75% for greater than or equal to 1 month  Subcutaneous Fat (Malnutrition): severe depletion  Muscle Mass (Malnutrition): severe depletion    Interventions/Recommendations (treatment strategy):  Collaboration of nutrition care with other providers  ONS    Nutrition Diagnosis Status:   Continues    Malnutrition Assessment    Malnutrition Context: social/environmental circumstances  Malnutrition Level: severe    Micronutrient Evaluation Summary: suspected deficiency  Micronutrient Evaluation Comments: protein, iron, B vitamins   Energy Intake (Malnutrition): less than or equal to 75% for greater than or equal to 1 month  Subcutaneous Fat (Malnutrition): severe depletion  Muscle Mass (Malnutrition): severe depletion   Orbital Region (Subcutaneous Fat Loss): severe depletion  Upper Arm Region (Subcutaneous Fat Loss): severe depletion  Thoracic and Lumbar Region: severe depletion   Hasty Region (Muscle Loss): severe depletion  Clavicle Bone Region (Muscle Loss): severe depletion  Clavicle and Acromion Bone Region (Muscle Loss): severe depletion  Scapular Bone Region (Muscle Loss): severe depletion  Patellar Region (Muscle Loss): severe depletion  Anterior Thigh Region  "(Muscle Loss): severe depletion  Posterior Calf Region (Muscle Loss): severe depletion     Reason for Assessment    Reason For Assessment: RD follow-up  Diagnosis:  GI hemorrhage   Relevant Medical History: severe protein-kcal malnutrition, ETOH abuse in remission, seizure disorder  Interdisciplinary Rounds: did not attend    General Information Comments: Diet per SLP; NGT removed & pt tolerating diet w/ 25-50% PO intake. Noted SLP rec'd Pureed diet w/ thin liquids yesterday (currently w/ nectar thick liquids ordered). + Weight loss PTA & severe wasting on physical exam (7/6). Pt meets criteria for severe malnutrition. Please see PES statement for details.   Nutrition Discharge Planning: Pending clinical course    Nutrition/Diet History    Patient Reported Diet/Restrictions/Preferences: general  Spiritual, Cultural Beliefs, Lutheran Practices, Values that Affect Care: no  Supplemental Drinks or Food Habits: Ensure Plus  Food Allergies: NKFA  Factors Affecting Nutritional Intake: difficulty/impaired swallowing, decreased appetite    Anthropometrics    Temp: 97.8 °F (36.6 °C)  Height: 5' 2" (157.5 cm)  Height (inches): 62 in  Weight Method: Bed Scale  Weight: 61.2 kg (134 lb 14.7 oz)  Weight (lb): 134.92 lb  Ideal Body Weight (IBW), Female: 110 lb  % Ideal Body Weight, Female (lb): 122.65 %  BMI (Calculated): 24.7  BMI Grade: 18.5-24.9 - normal    Lab/Procedures/Meds    Pertinent Labs Reviewed: reviewed  Pertinent Labs Comments: Bili 4.1  Pertinent Medications Reviewed: reviewed  Pertinent Medications Comments: Lactulose    Estimated/Assessed Needs    Weight Used For Calorie Calculations: 61.2 kg (134 lb 14.7 oz)    Energy Calorie Requirements (kcal): 4950-1395 kcal/d  Energy Need Method: Kcal/kg (30-35 kcal/kg)    Protein Requirements: 92 g/d (1.5 g/kg)  Weight Used For Protein Calculations: 61.2 kg (134 lb 14.7 oz)    Estimated Fluid Requirement Method: other (see comments) (Per MD or 1 mL/kcal)  RDA Method (mL): " 1839    Nutrition Prescription Ordered    Current Diet Order: Pureed w/ nectar thick liquids  Current Nutrition Support Formula Ordered: Other (Comment) (Discontinued)    Evaluation of Received Nutrient/Fluid Intake    I/O: -1.3L since 7/13    Comments: LBM: 7/24    Tolerance: tolerating    Nutrition Risk    Level of Risk/Frequency of Follow-up:  (1x/week)     Monitor and Evaluation    Food and Nutrient Intake: enteral nutrition intake  Food and Nutrient Adminstration: enteral and parenteral nutrition administration  Knowledge/Beliefs/Attitudes: beliefs and attitudes, food and nutrition knowledge/skill  Physical Activity and Function: nutrition-related ADLs and IADLs  Anthropometric Measurements: body mass index, weight change, weight, height/length  Biochemical Data, Medical Tests and Procedures: lipid profile, inflammatory profile, glucose/endocrine profile, gastrointestinal profile, electrolyte and renal panel  Nutrition-Focused Physical Findings: overall appearance     Nutrition Follow-Up    RD Follow-up?: Yes

## 2023-07-28 LAB
ALBUMIN SERPL BCP-MCNC: 1.8 G/DL (ref 3.5–5.2)
ALP SERPL-CCNC: 92 U/L (ref 55–135)
ALT SERPL W/O P-5'-P-CCNC: 12 U/L (ref 10–44)
ANION GAP SERPL CALC-SCNC: 6 MMOL/L (ref 8–16)
ANISOCYTOSIS BLD QL SMEAR: SLIGHT
APTT PPP: 38 SEC (ref 21–32)
AST SERPL-CCNC: 27 U/L (ref 10–40)
BACTERIA #/AREA URNS AUTO: ABNORMAL /HPF
BASOPHILS NFR BLD: 0 % (ref 0–1.9)
BILIRUB SERPL-MCNC: 3 MG/DL (ref 0.1–1)
BILIRUB UR QL STRIP: NEGATIVE
BUN SERPL-MCNC: 4 MG/DL (ref 6–20)
BURR CELLS BLD QL SMEAR: ABNORMAL
CA-I BLDV-SCNC: 1.15 MMOL/L (ref 1.06–1.42)
CA-I BLDV-SCNC: 1.2 MMOL/L (ref 1.06–1.42)
CALCIUM SERPL-MCNC: 7.7 MG/DL (ref 8.7–10.5)
CHLORIDE SERPL-SCNC: 118 MMOL/L (ref 95–110)
CLARITY UR REFRACT.AUTO: ABNORMAL
CO2 SERPL-SCNC: 18 MMOL/L (ref 23–29)
COLOR UR AUTO: YELLOW
CREAT SERPL-MCNC: 0.4 MG/DL (ref 0.5–1.4)
DACRYOCYTES BLD QL SMEAR: ABNORMAL
DIFFERENTIAL METHOD: ABNORMAL
EOSINOPHIL NFR BLD: 4 % (ref 0–8)
ERYTHROCYTE [DISTWIDTH] IN BLOOD BY AUTOMATED COUNT: ABNORMAL % (ref 11.5–14.5)
EST. GFR  (NO RACE VARIABLE): >60 ML/MIN/1.73 M^2
GLUCOSE SERPL-MCNC: 78 MG/DL (ref 70–110)
GLUCOSE UR QL STRIP: NEGATIVE
HCT VFR BLD AUTO: 25.2 % (ref 37–48.5)
HGB BLD-MCNC: 7.7 G/DL (ref 12–16)
HGB UR QL STRIP: NEGATIVE
IMM GRANULOCYTES # BLD AUTO: ABNORMAL K/UL (ref 0–0.04)
IMM GRANULOCYTES NFR BLD AUTO: ABNORMAL % (ref 0–0.5)
KETONES UR QL STRIP: ABNORMAL
LEUKOCYTE ESTERASE UR QL STRIP: ABNORMAL
LYMPHOCYTES NFR BLD: 11 % (ref 18–48)
MAGNESIUM SERPL-MCNC: 1.6 MG/DL (ref 1.6–2.6)
MCH RBC QN AUTO: 32.9 PG (ref 27–31)
MCHC RBC AUTO-ENTMCNC: 30.6 G/DL (ref 32–36)
MCV RBC AUTO: 108 FL (ref 82–98)
MICROSCOPIC COMMENT: ABNORMAL
MONOCYTES NFR BLD: 3 % (ref 4–15)
NEUTROPHILS NFR BLD: 82 % (ref 38–73)
NITRITE UR QL STRIP: POSITIVE
NRBC BLD-RTO: 0 /100 WBC
PH UR STRIP: 6 [PH] (ref 5–8)
PLATELET # BLD AUTO: 57 K/UL (ref 150–450)
PLATELET BLD QL SMEAR: ABNORMAL
PMV BLD AUTO: 10.6 FL (ref 9.2–12.9)
POCT GLUCOSE: 109 MG/DL (ref 70–110)
POCT GLUCOSE: 138 MG/DL (ref 70–110)
POCT GLUCOSE: 90 MG/DL (ref 70–110)
POCT GLUCOSE: 97 MG/DL (ref 70–110)
POIKILOCYTOSIS BLD QL SMEAR: SLIGHT
POTASSIUM SERPL-SCNC: 3.9 MMOL/L (ref 3.5–5.1)
PROT SERPL-MCNC: 4.4 G/DL (ref 6–8.4)
PROT UR QL STRIP: NEGATIVE
RBC # BLD AUTO: 2.34 M/UL (ref 4–5.4)
RBC #/AREA URNS AUTO: 2 /HPF (ref 0–4)
SODIUM SERPL-SCNC: 142 MMOL/L (ref 136–145)
SP GR UR STRIP: 1.01 (ref 1–1.03)
URN SPEC COLLECT METH UR: ABNORMAL
WBC # BLD AUTO: 1.89 K/UL (ref 3.9–12.7)
WBC #/AREA URNS AUTO: >100 /HPF (ref 0–5)
YEAST UR QL AUTO: ABNORMAL

## 2023-07-28 PROCEDURE — 87186 SC STD MICRODIL/AGAR DIL: CPT | Mod: 59 | Performed by: STUDENT IN AN ORGANIZED HEALTH CARE EDUCATION/TRAINING PROGRAM

## 2023-07-28 PROCEDURE — 87077 CULTURE AEROBIC IDENTIFY: CPT | Mod: 59 | Performed by: STUDENT IN AN ORGANIZED HEALTH CARE EDUCATION/TRAINING PROGRAM

## 2023-07-28 PROCEDURE — 27000221 HC OXYGEN, UP TO 24 HOURS

## 2023-07-28 PROCEDURE — 85027 COMPLETE CBC AUTOMATED: CPT

## 2023-07-28 PROCEDURE — 99233 SBSQ HOSP IP/OBS HIGH 50: CPT | Mod: GC,,, | Performed by: PSYCHIATRY & NEUROLOGY

## 2023-07-28 PROCEDURE — 99233 PR SUBSEQUENT HOSPITAL CARE,LEVL III: ICD-10-PCS | Mod: GC,,, | Performed by: PSYCHIATRY & NEUROLOGY

## 2023-07-28 PROCEDURE — 85007 BL SMEAR W/DIFF WBC COUNT: CPT

## 2023-07-28 PROCEDURE — 63600175 PHARM REV CODE 636 W HCPCS: Performed by: HOSPITALIST

## 2023-07-28 PROCEDURE — 87088 URINE BACTERIA CULTURE: CPT | Performed by: STUDENT IN AN ORGANIZED HEALTH CARE EDUCATION/TRAINING PROGRAM

## 2023-07-28 PROCEDURE — 82330 ASSAY OF CALCIUM: CPT | Mod: 91

## 2023-07-28 PROCEDURE — 97110 THERAPEUTIC EXERCISES: CPT

## 2023-07-28 PROCEDURE — 94761 N-INVAS EAR/PLS OXIMETRY MLT: CPT

## 2023-07-28 PROCEDURE — 81001 URINALYSIS AUTO W/SCOPE: CPT | Performed by: STUDENT IN AN ORGANIZED HEALTH CARE EDUCATION/TRAINING PROGRAM

## 2023-07-28 PROCEDURE — 87086 URINE CULTURE/COLONY COUNT: CPT | Performed by: STUDENT IN AN ORGANIZED HEALTH CARE EDUCATION/TRAINING PROGRAM

## 2023-07-28 PROCEDURE — 36415 COLL VENOUS BLD VENIPUNCTURE: CPT

## 2023-07-28 PROCEDURE — 97530 THERAPEUTIC ACTIVITIES: CPT

## 2023-07-28 PROCEDURE — 97112 NEUROMUSCULAR REEDUCATION: CPT

## 2023-07-28 PROCEDURE — 87154 CUL TYP ID BLD PTHGN 6+ TRGT: CPT | Performed by: STUDENT IN AN ORGANIZED HEALTH CARE EDUCATION/TRAINING PROGRAM

## 2023-07-28 PROCEDURE — 25000003 PHARM REV CODE 250: Performed by: HOSPITALIST

## 2023-07-28 PROCEDURE — 85730 THROMBOPLASTIN TIME PARTIAL: CPT

## 2023-07-28 PROCEDURE — 80053 COMPREHEN METABOLIC PANEL: CPT

## 2023-07-28 PROCEDURE — 99233 SBSQ HOSP IP/OBS HIGH 50: CPT | Mod: ,,, | Performed by: HOSPITALIST

## 2023-07-28 PROCEDURE — 93010 EKG 12-LEAD: ICD-10-PCS | Mod: ,,, | Performed by: INTERNAL MEDICINE

## 2023-07-28 PROCEDURE — 93005 ELECTROCARDIOGRAM TRACING: CPT

## 2023-07-28 PROCEDURE — 93010 ELECTROCARDIOGRAM REPORT: CPT | Mod: ,,, | Performed by: INTERNAL MEDICINE

## 2023-07-28 PROCEDURE — 27200966 HC CLOSED SUCTION SYSTEM

## 2023-07-28 PROCEDURE — 99900035 HC TECH TIME PER 15 MIN (STAT)

## 2023-07-28 PROCEDURE — 20600001 HC STEP DOWN PRIVATE ROOM

## 2023-07-28 PROCEDURE — 94640 AIRWAY INHALATION TREATMENT: CPT

## 2023-07-28 PROCEDURE — 97535 SELF CARE MNGMENT TRAINING: CPT

## 2023-07-28 PROCEDURE — 83735 ASSAY OF MAGNESIUM: CPT

## 2023-07-28 PROCEDURE — 92526 ORAL FUNCTION THERAPY: CPT

## 2023-07-28 PROCEDURE — 99233 PR SUBSEQUENT HOSPITAL CARE,LEVL III: ICD-10-PCS | Mod: ,,, | Performed by: HOSPITALIST

## 2023-07-28 PROCEDURE — 25000242 PHARM REV CODE 250 ALT 637 W/ HCPCS: Performed by: HOSPITALIST

## 2023-07-28 RX ORDER — LITHIUM CARBONATE 300 MG/1
300 TABLET, FILM COATED, EXTENDED RELEASE ORAL NIGHTLY
Status: DISCONTINUED | OUTPATIENT
Start: 2023-07-28 | End: 2023-08-04 | Stop reason: HOSPADM

## 2023-07-28 RX ADMIN — CEFTRIAXONE 1 G: 1 INJECTION, POWDER, FOR SOLUTION INTRAMUSCULAR; INTRAVENOUS at 10:07

## 2023-07-28 RX ADMIN — LACTULOSE 10 G: 20 SOLUTION ORAL at 09:07

## 2023-07-28 RX ADMIN — LACTULOSE 10 G: 20 SOLUTION ORAL at 10:07

## 2023-07-28 RX ADMIN — IPRATROPIUM BROMIDE AND ALBUTEROL SULFATE 3 ML: 2.5; .5 SOLUTION RESPIRATORY (INHALATION) at 01:07

## 2023-07-28 RX ADMIN — OLANZAPINE 5 MG: 5 TABLET, FILM COATED ORAL at 10:07

## 2023-07-28 RX ADMIN — IPRATROPIUM BROMIDE AND ALBUTEROL SULFATE 3 ML: 2.5; .5 SOLUTION RESPIRATORY (INHALATION) at 08:07

## 2023-07-28 RX ADMIN — LITHIUM CARBONATE 300 MG: 300 TABLET, FILM COATED, EXTENDED RELEASE ORAL at 10:07

## 2023-07-28 RX ADMIN — LACTULOSE 10 G: 20 SOLUTION ORAL at 03:07

## 2023-07-28 RX ADMIN — PANTOPRAZOLE SODIUM 40 MG: 40 GRANULE, DELAYED RELEASE ORAL at 10:07

## 2023-07-28 RX ADMIN — LEVETIRACETAM 1000 MG: 500 SOLUTION ORAL at 09:07

## 2023-07-28 NOTE — PT/OT/SLP PROGRESS
Physical Therapy Co-Treatment with OT      Patient Name:  Marely Hamilton   MRN:  [id number]    Recommendations:     Discharge Recommendations: nursing facility, skilled  Discharge Equipment Recommendations: wheelchair, bedside commode, bath bench, grab bar (walk in shower with grab bars)  Barriers to discharge: None    Assessment:     Marely Hamilton is a 58 y.o. female admitted with a medical diagnosis of Gastrointestinal hemorrhage associated with duodenal ulcer.  She presents with the following impairments/functional limitations: weakness, impaired balance, decreased safety awareness, impaired endurance, impaired functional mobility, decreased lower extremity function, decreased coordination, impaired cognition. Patient demonstrated no meaningful change in cognition impacting mobility and has maintained similar LE function compared to last visit. Patient did not tolerate the same duration of EOB exercises prior to becoming fatigued. Largest factor impacting additional progress currently is likely cognitive function. Continue to recommend discharge to SNF to maximize function.     Co-treatment with OT performed due to patient complexity and deficits, requiring two skilled therapists to appropriately and safely assess patient's strength and endurance while facilitating functional tasks in addition to accommodating for patient's activity tolerance.     Rehab Prognosis: Good; patient would benefit from acute skilled PT services to address these deficits and reach maximum level of function.    Recent Surgery: Procedure(s) (LRB):  EGD (ESOPHAGOGASTRODUODENOSCOPY) (N/A) 17 Days Post-Op    Plan:     During this hospitalization, patient to be seen 3 x/week to address the identified rehab impairments via therapeutic activities, therapeutic exercises, neuromuscular re-education and progress toward the following goals:    Plan of Care Expires:  08/10/23    Plan for next session: attempt sit to stand, EOB balance, LE  "strengthening     Subjective     Chief Complaint: fatigue  Patient/Family Comments/goals: "I'm tired I want to lay down". "Yes" re: plan to re-attempt sit to stand next visit. "Ow" Patient reports pain/tenderness with L foot palpation (edema present).  Pain/Comfort:  Pain Rating 1: 0/10  Pain Rating Post-Intervention 1: 0/10      Objective:     Communicated with RN prior to session.  Patient found HOB elevated with pressure relief boots upon PT entry to room.     General Precautions: Standard, fall, aspiration  Orthopedic Precautions: N/A  Braces: N/A  Respiratory Status: Room air, NC in room connected to oxygen however not donned     Cognition:   Attention: requires multiple cues to focus on task; distractible   Command Following: follows 75% of 1 step commands, requires repetition of directions. Responds best to motor commands with external cues.  Communication: able to communicate needs clearly.     Functional Mobility:  Bed Mobility:     Rolling Left:  maximal assistance and of 2 persons  Scooting: maximal assistance and of 2 persons  Supine to Sit: maximal assistance and of 2 persons  Sit to Supine: maximal assistance and of 2 persons    Balance  Static seated balance: maxA to supervision. Posterior and R lean tendency, responds well to cues for correction.   Dynamic seated balance: maxA to CGA during EOB exercises.     AM-PAC 6 CLICK MOBILITY  Turning over in bed (including adjusting bedclothes, sheets and blankets)?: 2  Sitting down on and standing up from a chair with arms (e.g., wheelchair, bedside commode, etc.): 1  Moving from lying on back to sitting on the side of the bed?: 2  Moving to and from a bed to a chair (including a wheelchair)?: 1  Need to walk in hospital room?: 1  Climbing 3-5 steps with a railing?: 1  Basic Mobility Total Score: 8       Treatment & Education:  Neuro Re-education  -Seated Balance: UE reaching outside CHETAN while seated EOB, patient alternating reaching to target with UE. LE " exercises (knee F/E, ankle DF/PF) while balancing EOB and engaging trunk and UE musculature to maintain upright sitting.  -Coordination Tasks: cues to isolate specific motions during balance exercises to progress control of LE motions.     Therapeutic Exercise  Seated LE exercises to strengthen: hip flexion, knee flexion/extension.       Patient left HOB elevated with all lines intact, call button in reach, and RN notified and present.     GOALS:   Multidisciplinary Problems       Physical Therapy Goals          Problem: Physical Therapy    Goal Priority Disciplines Outcome Goal Variances Interventions   Physical Therapy Goal     PT, PT/OT Ongoing, Progressing     Description: Goals to be met by: 8/10/23     Patient will increase functional independence with mobility by performin. Supine to sit with Moderate Assistance  2. Rolling to Left  with Minimal Assistance.  3. Sit to stand transfer with Moderate Assistance  4. Bed to chair transfer with Maximum Assistance   5. Sitting at edge of bed x10 minutes with Contact Guard Assistance to perform functional activities.   6. Lower extremity exercise program x10 reps per handout, with assistance as needed to improve strength for functional activities.                          Time Tracking:     PT Received On: 23  PT Start Time: 1113     PT Stop Time: 1139  PT Total Time (min): 26 min     Billable Minutes: Therapeutic Exercise 10 minutes and Neuromuscular Re-education 16 minutes    Treatment Type: Treatment  PT/PTA: PT     Number of PTA visits since last PT visit: 0     2023

## 2023-07-28 NOTE — PLAN OF CARE
07/28/23 0938   Post-Acute Status   Post-Acute Authorization Placement   Post-Acute Placement Status Pending medical clearance/testing     Per MD, patient was confused yesterday and UA was +. Patient was started on an IV abx. Sent updated clinicals to [facility] in [facility].    4:40 PM  Per MD team, patient has been started on lithium. Per Rere at [facility], will speak with her DON and should be able to accommodate patient with that medication. Updated patient's sister, Zaria.    Mirela Cintron, LCSW [facility]  Ext. [phone]

## 2023-07-28 NOTE — PT/OT/SLP PROGRESS
"Speech Language Pathology Treatment    Patient Name:  Marely Hamilton   MRN:  [id number]  Admitting Diagnosis: Gastrointestinal hemorrhage associated with duodenal ulcer    Recommendations:                 General Recommendations:  Dysphagia therapy  Diet recommendations:  Mechanical soft, Thin   Aspiration Precautions: 1 bite/sip at a time, Assistance with meals and Assistance with thickening liquids, Eliminate distractions, Feed only when awake/alert, Frequent oral care, HOB to 90 degrees, Monitor for s/s of aspiration, Remain upright 30 minutes post meal, Small bites/sips, and Strict aspiration precautions   General Precautions: Standard, fall, aspiration  Communication strategies:  provide increased time to answer and go to room if call light pushed    Assessment:     Marely Hamilton is a 58 y.o. female with an SLP diagnosis of mild Oropharyngeal dysphagia. Pt demonstrates ongoing tolerance of thin liquids with no evidence of impulsivity with PO trials. Recommend upgrade to mechanical soft solids as well at this time. SLP to continue to follow.     Subjective     Pt awake/alert and agreeable to participate. Intermittent confusion and confabulations persist. Pt stated, "Breakfast chocolate" as she was found with pudding cup inside bowl of cream of wheat on breakfast tray.    Pain/Comfort:  Pain Rating 1: 0/10    Respiratory Status: Nasal cannula, flow 2 L/min    Objective:     Has the patient been evaluated by SLP for swallowing?   Yes  Keep patient NPO? No   Current Respiratory Status:  nasal cannula     Pt seen for ongoing dysphagia therapy. She consumed clinciain-regulated bites of semi martinez (altagracia cracker pieces coated in pudding) without evidence of overstuffing or oral residue across x5 bites. Sips of thin liquids self-regualted with adequate bolus size and no overt signs of aspiration including no coughing, throat clearing, or change in vocal quality. Spoke with pt regarding overall impressions, " recommendations for upgrade to mechanical soft diet, continuation of thin liquids with strict use of small sips, and ongoing SLP POC. Pt able to recall small sips as a safe swallow strategy with 5 minute delay though concern for ability to consistently recall and functionally implement during meals; will continue to monitor for appropriateness for thin liquids in future sessions. Pt left with bed in lowest locked position upon SLP exit.     Goals:   Multidisciplinary Problems       SLP Goals          Problem: SLP    Goal Priority Disciplines Outcome   SLP Goal     SLP Ongoing, Progressing   Description: Speech Pathology Goals  To be met by 8/11/23  1. Pt will exhibit a functional swallow for tolerance of a mechanical soft diet with thin liquids in order to maintain adequate hydration and nutrition  2. Pt will participate in ongoing diagnostic dysphagia therapy        Speech Pathology Goals  To be met by 7/27/23  1. Pt will participate in ongoing diagnostic dysphagia therapy                         Plan:     Patient to be seen:  3 x/week   Plan of Care expires:  08/12/23  Plan of Care reviewed with:  patient   SLP Follow-Up:  Yes       Discharge recommendations:  nursing facility, skilled   Barriers to Discharge:  Level of Skilled Assistance Needed      Time Tracking:     SLP Treatment Date:   07/28/23  Speech Start Time:  1003  Speech Stop Time:  1014     Speech Total Time (min):  11 min    Billable Minutes: Treatment Swallowing Dysfunction 11      07/28/2023

## 2023-07-28 NOTE — MEDICAL/APP STUDENT
"CONSULTATION LIAISON PSYCHIATRY PROGRESS NOTE    Patient Name: Marely Hamilton  MRN: [id number]  Patient Class: IP- Inpatient  Admission Date: 7/5/2023  Attending Physician: Seth Noyola MD      SUBJECTIVE:   Marely Hamilton is a 58 y.o. female with past psychiatric history of bipolar I disorder, alcohol use disorder & past pertinent medical history of cirrhosis, GI hemorrhage presents to the ED/admitted to the hospital for Gastrointestinal hemorrhage associated with duodenal ulcer    Psychiatry consulted for history of bipolar disorder    Today, the patient is pleasant and alert and reports her mood as "good." She has active visual hallucinations, including seeing "hot dogs" and "life jackets" in the room. She denies auditory hallucinations. She is oriented to person only. When asked where we are, she says "[place]." Her thoughts are tangential and disorganized. She denies any side effects to medications. She denies SI/HI.       OBJECTIVE:    Mental Status Exam:  General Appearance: lying in bed, appears older than stated age, disheveled  Behavior: normal; cooperative; reasonably friendly, pleasant, and polite; appropriate eye-contact; under good behavioral control  Involuntary Movements and Motor Activity: +tremors  Gait and Station: unable to assess - patient lying down or seated  Speech and Language: intact; normal rate, rhythm, volume, tone, and pitch; conversational, spontaneous, and coherent; speaks and understands English proficiently and fluently; repeats words and phrases, no word finding difficulties are noted  Mood: "good"  Affect: full-range  Thought Process and Associations: disorganized, tangential  Thought Content and Perceptions:: no suicidal ideation, no homicidal ideation, + visual hallucinations  Sensorium and Orientation: oriented to person only  Recent and Remote Memory: impaired  Attention and Concentration: grossly intact, attentive to the conversation and not readily " distractible  Fund of Knowledge: impaired  Insight: impaired  Judgment: impaired    CAM ICU positive? yes      RISK ASSESSMENT  NO NEED FOR PEC     FOLLOW UP  Will follow up while in house    DISPOSITION - once medically cleared:  Defer to medical team    Please contact ON CALL psychiatry service (24/7) for any acute issues that may arise.    Mayra Box, MS-III  CL Psychiatry  [facility]  7/28/2023 11:37 AM        --------------------------------------------------------------------------------------------------------------------------------------------------------------------------------------------------------------------------------------    CONTINUED OBJECTIVE clinical data & findings reviewed and noted for above decision making    Current Medications:   Scheduled Meds:    albuterol-ipratropium  3 mL Nebulization Q6H WAKE    cefTRIAXone (ROCEPHIN) IVPB  1 g Intravenous Q24H    lactulose  10 g Oral TID    levetiracetam  1,000 mg Oral BID    OLANZapine  5 mg Oral QHS    pantoprazole  40 mg Oral BID     PRN Meds: dextrose 10%, dextrose 10%, glucagon (human recombinant), insulin aspart U-100, oxyCODONE, sodium chloride 0.9%    Allergies:   Review of patient's allergies indicates:   Allergen Reactions    Sulfa (sulfonamide antibiotics) Rash    Codeine Nausea And Vomiting       Vitals  Vitals:    07/28/23 0833   BP: 116/60   Pulse: 89   Resp: 18   Temp: 98.3 °F (36.8 °C)       Labs/Imaging/Studies:  Recent Results (from the past 24 hour(s))   POCT glucose    Collection Time: 07/27/23  3:32 PM   Result Value Ref Range    POCT Glucose 173 (H) 70 - 110 mg/dL   Calcium, ionized    Collection Time: 07/27/23  5:10 PM   Result Value Ref Range    Ionized Calcium 1.14 1.06 - 1.42 mmol/L   POCT glucose    Collection Time: 07/28/23 12:40 AM   Result Value Ref Range    POCT Glucose 138 (H) 70 - 110 mg/dL   Urinalysis, Reflex to Urine Culture Urine, Clean Catch    Collection Time: 07/28/23  5:41 AM    Specimen:  Urine   Result Value Ref Range    Specimen UA Urine, Clean Catch     Color, UA Yellow Yellow, Straw, Sarah    Appearance, UA Hazy (A) Clear    pH, UA 6.0 5.0 - 8.0    Specific Gravity, UA 1.015 1.005 - 1.030    Protein, UA Negative Negative    Glucose, UA Negative Negative    Ketones, UA Trace (A) Negative    Bilirubin (UA) Negative Negative    Occult Blood UA Negative Negative    Nitrite, UA Positive (A) Negative    Leukocytes, UA 3+ (A) Negative   Urinalysis Microscopic    Collection Time: 07/28/23  5:41 AM   Result Value Ref Range    RBC, UA 2 0 - 4 /hpf    WBC, UA >100 (H) 0 - 5 /hpf    Bacteria Occasional None-Occ /hpf    Yeast, UA Few (A) None    Microscopic Comment SEE COMMENT    APTT    Collection Time: 07/28/23  6:11 AM   Result Value Ref Range    aPTT 38.0 (H) 21.0 - 32.0 sec   Calcium, ionized    Collection Time: 07/28/23  6:11 AM   Result Value Ref Range    Ionized Calcium 1.15 1.06 - 1.42 mmol/L   CBC Auto Differential    Collection Time: 07/28/23  6:11 AM   Result Value Ref Range    WBC 1.89 (LL) 3.90 - 12.70 K/uL    RBC 2.34 (L) 4.00 - 5.40 M/uL    Hemoglobin 7.7 (L) 12.0 - 16.0 g/dL    Hematocrit 25.2 (L) 37.0 - 48.5 %     (H) 82 - 98 fL    MCH 32.9 (H) 27.0 - 31.0 pg    MCHC 30.6 (L) 32.0 - 36.0 g/dL    RDW SEE COMMENT 11.5 - 14.5 %    Platelets 57 (L) 150 - 450 K/uL    MPV 10.6 9.2 - 12.9 fL    Immature Granulocytes CANCELED 0.0 - 0.5 %    Immature Grans (Abs) CANCELED 0.00 - 0.04 K/uL    nRBC 0 0 /100 WBC    Gran % 82.0 (H) 38.0 - 73.0 %    Lymph % 11.0 (L) 18.0 - 48.0 %    Mono % 3.0 (L) 4.0 - 15.0 %    Eosinophil % 4.0 0.0 - 8.0 %    Basophil % 0.0 0.0 - 1.9 %    Platelet Estimate Decreased (A)     Aniso Slight     Poik Slight     Tear Drop Cells Occasional     Mobile Cells Occasional     Differential Method Manual    Comprehensive Metabolic Panel    Collection Time: 07/28/23  6:11 AM   Result Value Ref Range    Sodium 142 136 - 145 mmol/L    Potassium 3.9 3.5 - 5.1 mmol/L    Chloride 118  (H) 95 - 110 mmol/L    CO2 18 (L) 23 - 29 mmol/L    Glucose 78 70 - 110 mg/dL    BUN 4 (L) 6 - 20 mg/dL    Creatinine 0.4 (L) 0.5 - 1.4 mg/dL    Calcium 7.7 (L) 8.7 - 10.5 mg/dL    Total Protein 4.4 (L) 6.0 - 8.4 g/dL    Albumin 1.8 (L) 3.5 - 5.2 g/dL    Total Bilirubin 3.0 (H) 0.1 - 1.0 mg/dL    Alkaline Phosphatase 92 55 - 135 U/L    AST 27 10 - 40 U/L    ALT 12 10 - 44 U/L    eGFR >60.0 >60 mL/min/1.73 m^2    Anion Gap 6 (L) 8 - 16 mmol/L   Magnesium    Collection Time: 07/28/23  6:12 AM   Result Value Ref Range    Magnesium 1.6 1.6 - 2.6 mg/dL     Imaging Results              CTA Acute GI [place], Abdomen and Pelvis (Final result)  Result time 07/05/23 17:15:19      Final result by Mumtaz Garsia MD (07/05/23 17:15:19)                   Impression:      Images are degraded by significant streak and motion artifacts.    Stable large left retroperitoneal hematoma and left iliacus hematoma.  No contrast extravasation within the hematomas or bowel to indicate active arterial bleed.    Hepatic cirrhosis.  Diffuse wall thickening of the stomach and colon, which can be seen in the setting of hepatic dysfunction.  However, superimposed inflammatory processes are not excluded.    Multiple, nondilated, distended fluid-filled loops of small bowel without a definite transition point.  Stool and air seen within the colon, this is suggestive of ileus.    Small left pleural effusion with associated compressive atelectasis.    Cholelithiasis.    Cardiomegaly.    Electronically signed by resident: Emiliano Mcmahon  Date:    07/05/2023  Time:    16:29    Electronically signed by: Mumtaz Garsia MD  Date:    07/05/2023  Time:    17:15               Narrative:    EXAMINATION:  CTA ACUTE GI BLEED, ABDOMEN AND PELVIS    CLINICAL HISTORY:  GI bleed;    TECHNIQUE:  Initial noncontrast  images were obtained of the abdomen and pelvis.  CT axial angiography images were then obtained from the lung bases to the pubic symphysis  following the intravenous administration of 100 of Omnipaque 350 with delayed images obtained per GI bleeding protocol.  Sagittal and coronal reformats were provided.    COMPARISON:  CTA GI bleed 06/13/2023    FINDINGS:  Images are degraded by significant streak and motion artifacts.    Lungs: Interval decrease in size of the small right pleural effusion with associated compressive atelectasis.  Resolution of the left pleural effusion.    Heart: Enlarged.  No pericardial effusion.    Liver: Nodular contour of the liver.  No focal abnormality.    Gallbladder: Multiple calcified gallstones.  No pericholecystic inflammatory changes.    Bile ducts: No intrahepatic or extrahepatic biliary ductal dilatation.    Spleen: Normal size.    Pancreas: No mass. No ductal dilatation. No peripancreatic fat stranding.    Adrenals: No significant abnormality    Renal/Ureters: Bilateral cortical thinning.  No hydronephrosis.  Proximal ureters are normal caliber.  The distal ureters are difficult to evaluate due to streak artifact.  Bladder is decompressed with Saldaña catheter in place.    Reproductive: Uterus appears without significant abnormality.  No adnexal mass, noting limited evaluation due to significant streak artifact.    Stomach/Bowel: Stomach is distended with ingested contents with thickened rugal folds.  Small bowel is distended with multiple nondilated fluid-filled loops.  No transition point.  Appendix is normal.  Diffuse wall thickening throughout the colon with mild stool burden.  Diffuse wall thickening of the rectum.  No contrast extravasation to indicate active arterial bleed.    Peritoneum: Stable large left retroperitoneal hematoma measuring 11.5 x 7.8 x 12.6 cm.  No contrast extravasation to indicate active bleed within the hematoma.  Additional smaller hematoma anterior to the left iliacus muscle, which appears stable compared to prior CTA.  No free fluid. No intraperitoneal free air.    Lymph Nodes: Multiple  prominent mesenteric and retroperitoneal lymph nodes.    Vasculature: Abdominal aorta tapers normally.  Mild atherosclerosis of the abdominal aorta and its branches.    Bones: Bony mineralization is diminished.  Left hip arthroplasty.  Generalized body wall edema.    Soft Tissues: No significant abnormality.                                       CT Head Without Contrast (Final result)  Result time 07/05/23 15:10:53      Final result by Viktor Desouza MD (07/05/23 15:10:53)                   Impression:      No evidence of acute intracranial pathology.    Volume loss again identified.    Electronically signed by resident: Kenney Rosas  Date:    07/05/2023  Time:    14:45    Electronically signed by: Viktor Desouza  Date:    07/05/2023  Time:    15:10               Narrative:    EXAMINATION:  CT HEAD WITHOUT CONTRAST    CLINICAL HISTORY:  Mental status change, unknown cause;    TECHNIQUE:  Low dose axial CT images obtained throughout the head without the use of intravenous contrast.  Axial, sagittal and coronal reconstructions were performed.    COMPARISON:  CT head 06/22/2023    FINDINGS:  Intracranial compartment:    Volume loss without evidence of hydrocephalus.    No parenchymal  hemorrhage, edema, mass effect or major vascular distribution infarct.    Decreased attenuation in the periventricular white matter is nonspecific but may reflect mild chronic small vessel ischemic change vs other encephalopathy.    No extra-axial blood or fluid collections.    Skull/extracranial contents (limited evaluation):    No displaced calvarial fracture.    The visualized sinuses and mastoid air cells are clear.                                       X-Ray Chest AP Portable (Final result)  Result time 07/05/23 13:09:11      Final result by Americo Reyes MD (07/05/23 13:09:11)                   Impression:      No significant detrimental interval change in the appearance of the chest since 06/25/2023 is appreciated, with  significant improvement as noted above.  No post procedure pneumothorax.      Electronically signed by: Americo Reyes MD  Date:    07/05/2023  Time:    13:09               Narrative:    EXAMINATION:  XR CHEST AP PORTABLE    TECHNIQUE:  One view was obtained.  Note is made of the fact that the thorax is obscured to some extent by opacities external to the patient, particularly defibrillator pads.    COMPARISON:  Comparison is made to the most recent prior chest radiograph of 06/25/2023.    FINDINGS:  Endotracheal tube has been placed, its tip lying just superior to the apex of the aortic arch, well above the katharina.  Left jugular origin vascular catheter is now seen, its tip in the superior vena cava near the junction of the right and left brachiocephalic veins.  Allowing for magnification of the cardiomediastinal silhouette related to projection, the heart is not significantly enlarged.  Opacity in both inferior hemithoraces seen on the previous examination has resolved, consistent with clearing of airspace consolidation in both mid/lower lung zones and resolution of previously present bilateral pleural fluid.  Lung zones are currently clear and free of significant airspace consolidation or volume loss.  No pleural fluid of any substantial volume is seen on either side.  No pneumothorax.

## 2023-07-28 NOTE — PROGRESS NOTES
[facility] - Telemetry Stepdown  Wound Care    Patient Name:  Marely Hamilton   MRN:  [id number]  Date: 7/28/2023  Diagnosis: Gastrointestinal hemorrhage associated with duodenal ulcer    History:     Past Medical History:   Diagnosis Date    Addiction to drug     Alcohol abuse     Alcohol abuse, in remission 6/15/2015    14.5 weeks ago; AA weekly    Anemia     Anxiety 6/15/2015    Behavioral problem     Bipolar disorder     Bipolar disorder in remission 6/15/2015    Cirrhosis, Laennec's 6/15/2015    Depression     Encounter for blood transfusion     Epistaxis 6/15/2015    Fatigue     Glaucoma     Hematuria     Hepatic encephalopathy 6/15/2015    Hepatic enlargement     History of psychiatric care     History of psychiatric hospitalization     History of seizure 6/15/2015    1    hx of intentional Tylenol overdose 6/15/2015    2005- situational and hx of bipolar    Hx of psychiatric care     Macrocytic anemia 9/18/2015    6 units PRBC since June 2015    [place]ana     Osteoarthritis     Other ascites 6/15/2015    Psychiatric exam requested by authority     Psychiatric problem     Psychosis 9/26/2019    Renal disorder     Seizures     Self-harming behavior     Suicide attempt     Therapy     Thrombocytopenia 6/15/2015       Social History     Socioeconomic History    Marital status: Single   Occupational History    Occupation: Disabled     Employer: DISABLED   Tobacco Use    Smoking status: Never    Smokeless tobacco: Never   Substance and Sexual Activity    Alcohol use: Not Currently     Comment: hx of ETOH abuse with cirrhosis of liver    Drug use: No    Sexual activity: Not Currently   Other Topics Concern    Patient feels they ought to cut down on drinking/drug use No    Patient annoyed by others criticizing their drinking/drug use No    Patient has felt bad or guilty about drinking/drug use No    Patient has had a drink/used drugs as an eye opener in the AM No   Social History Narrative    Pt has 1 older and 1 younger  sister.  Her father committed suicide when she was 18.  She has a [employer] degree and worked as an , but she has been disabled since age 40.  She never  and has no children.  She is not dating and claims no current friends.  She currently lives with her mother along with her pet dog.  She denies any hobbies and says she is not Anabaptism.     Social Determinants of Health     Financial Resource Strain: Unknown    Difficulty of Paying Living Expenses: Patient refused   Food Insecurity: Unknown    Worried About Running Out of Food in the Last Year: Patient refused    Ran Out of Food in the Last Year: Patient refused   Transportation Needs: Unknown    Lack of Transportation (Medical): Patient refused    Lack of Transportation (Non-Medical): Patient refused   Physical Activity: Unknown    Days of Exercise per Week: Patient refused    Minutes of Exercise per Session: Patient refused   Stress: Unknown    Feeling of Stress : Patient refused   Social Connections: Unknown    Frequency of Communication with Friends and Family: Patient refused    Frequency of Social Gatherings with Friends and Family: Patient refused    Attends Rastafarian Services: Patient refused    Active Member of Clubs or Organizations: Patient refused    Attends Club or Organization Meetings: Patient refused    Marital Status: Patient refused   Housing Stability: Unknown    Unable to Pay for Housing in the Last Year: Patient refused    Number of Places Lived in the Last Year: 1    Unstable Housing in the Last Year: Patient refused       Precautions:     Allergies as of 07/05/2023 - Reviewed 07/05/2023   Allergen Reaction Noted    Sulfa (sulfonamide antibiotics) Rash 11/26/2014    Codeine Nausea And Vomiting 04/26/2018       [facility] Assessment Details/Treatment   Patient seen for wound care consultation.   Reviewed chart for this encounter.   See Flow Sheet for findings.    The sacrum has a healing partial thickness skin loss from moisture associated  dermatitis. The wound bed is pink and moist with no drainage. Triad applied. The periwound is intact and dry. No fistula noted near the rectum. Patient educated on the importance of turning every 2 hours and triad use.     RECOMMENDATIONS:  - Sacrum, buttocks and perineum: bedside nursing to cleanse with coloplast wipes or soap and water, pat dry and apply triad BID and PRN; no diapers nor dressings   - Turning every 2 hours  - Heel protecting boots  - Immerse mattress  - Nursing to maintain pressure injury prevention interventions       07/28/23 0737        Altered Skin Integrity 06/29/23 1523 Sacral spine Moisture associated dermatitis   Date First Assessed/Time First Assessed: 06/29/23 1523   Altered Skin Integrity Present on Admission - Did Patient arrive to the hospital with altered skin?: suspected hospital acquired  Location: Sacral spine  Primary Wound Type: Moisture associated ...   Wound Image    Dressing Appearance Open to air   Drainage Amount None   Appearance Pink;Moist   Tissue loss description Partial thickness   Periwound Area Intact;Dry   Care Cleansed with:;Soap and water   Dressing Applied;Other (comment)  (triad)   Periwound Care Moisture barrier applied       Recommendations made to primary team for above plan via secure chat. Orders placed. Wound care will follow-up as needed.     07/28/2023

## 2023-07-28 NOTE — PROGRESS NOTES
"CONSULTATION LIAISON PSYCHIATRY PROGRESS NOTE    Patient Name: Marely Hamilton  MRN: [id number]  Patient Class: IP- Inpatient  Admission Date: 7/5/2023  Attending Physician: Seth Noyola MD      SUBJECTIVE:   Marely Hamilton is a 58 y.o. female with past psychiatric history of bipolar disorder, alcohol use disorder & past pertinent medical history of seizure disorder, alcohol induced hepatic cirrhosis presents to the ED/admitted to the hospital for Gastrointestinal hemorrhage associated with duodenal ulcer. Patient presented from SNF (when she experienced alida blood per rectum per chart review), for which she was recently discharged to when she presented to the hospital on for hepatic encephalopathy c/b retroperitoneal bleed (s/p ICV filter and IR embolization).     Psychiatry consulted for medication management    Medical Student Evaluation, Mayra Box:  Today, the patient is pleasant and alert and reports her mood as "good." She has active visual hallucinations, including seeing "hot dogs" and "life jackets" in the room. She denies auditory hallucinations. When asked where we are, she says "[place]." Her thoughts are tangential and disorganized. She denies any side effects to medications. She denies SI/HI.    Resident Evaluation:  Today, pt was awake, lying in bed watching TV. Prior to entering room, patient responding to internal stimuli, sitting in bed talking to herself. She stated that she was doing better. She was oriented to self, place, and year. Patient with loose associations, disorganized speech, and difficult to follow on assessment.      Interval Events: Patient did not require behavioral PRNs overnight. Patient UA+, started on IV ceftriaxone.      OBJECTIVE:    Mental Status Exam:  General Appearance: dressed in hospital garb, lying in bed, appears older than stated age, thin and gauntfriendly, polite  Behavior:   Involuntary Movements and Motor Activity: +tremors  Gait and Station: unable to " "assess - patient lying down or seated  Speech and Language: fluent English, soft, monotone, talkative  Mood: "better"  Affect: calm  Thought Process and Associations: bizarre, tangential, scattered, +loosening of associations  Thought Content and Perceptions:: + visual hallucinations, + delusions  Sensorium and Orientation: alert, oriented to person and place, oriented partially to time  Recent and Remote Memory: impaired  Attention and Concentration: easily distractible  Fund of Knowledge: impaired  Insight: limited/partial awareness of illness  Judgment: poor      ASSESSMENT & RECOMMENDATIONS   Hx of Bipolar Disorder    PSYCH MEDICATIONS  Scheduled: Recommend restarting lithium 300 mg nightly and Continue zyprexa 5 mg nightly  Does not appear to be requiring PRNs at this time, in future if patient does become agitated recommend Zyprexa 5 mg PO/IM.   Monitor QTc if patient requires PRN zyprexa.      DELIRIUM PRECAUTION BEHAVIOR MANAGEMENT  PLEASE utilize CHEMICAL restraints with PRN meds first for agitation. Minimize use of PHYSICAL restraints OR have periods of being out of physical restraints if possible.  Keep window shades open and room lit during day and room dim at night in order to promote normal sleep-wake cycles  Encourage family at bedside. [place] patient often to situation, location, date.  Continue to Limit or Discontinue use of Narcotics, Benzos and Anti-cholinergic medications as they may worsen delirium.  Continue medical workup for causative etiology of Delirium.      RISK ASSESSMENT  NO NEED FOR PEC patient NOT in any imminent danger of hurting self or others and not gravely disabled.      FOLLOW UP  Will follow up while in house     DISPOSITION - once medically cleared:   Defer to medical team     Please contact ON CALL psychiatry service (24/7) for any acute issues that may arise.    I attest to having seen and evaluated the above patient with student, Mayra Box. I have personally reviewed the " note, assessment, and plan, and have made necessary adjustments.        Dr. Mehdi Aguilera   Psychiatry  [facility]  7/28/2023 10:27 AM        --------------------------------------------------------------------------------------------------------------------------------------------------------------------------------------------------------------------------------------    CONTINUED OBJECTIVE clinical data & findings reviewed and noted for above decision making    Current Medications:   Scheduled Meds:    albuterol-ipratropium  3 mL Nebulization Q6H WAKE    cefTRIAXone (ROCEPHIN) IVPB  1 g Intravenous Q24H    lactulose  10 g Oral TID    levetiracetam  1,000 mg Oral BID    OLANZapine  5 mg Oral QHS    pantoprazole  40 mg Oral BID     PRN Meds: dextrose 10%, dextrose 10%, glucagon (human recombinant), insulin aspart U-100, oxyCODONE, sodium chloride 0.9%    Allergies:   Review of patient's allergies indicates:   Allergen Reactions    Sulfa (sulfonamide antibiotics) Rash    Codeine Nausea And Vomiting       Vitals  Vitals:    07/28/23 0833   BP: 116/60   Pulse: 89   Resp: 18   Temp: 98.3 °F (36.8 °C)       Labs/Imaging/Studies:  Recent Results (from the past 24 hour(s))   POCT glucose    Collection Time: 07/27/23 11:27 AM   Result Value Ref Range    POCT Glucose 268 (H) 70 - 110 mg/dL   POCT glucose    Collection Time: 07/27/23  3:32 PM   Result Value Ref Range    POCT Glucose 173 (H) 70 - 110 mg/dL   Calcium, ionized    Collection Time: 07/27/23  5:10 PM   Result Value Ref Range    Ionized Calcium 1.14 1.06 - 1.42 mmol/L   POCT glucose    Collection Time: 07/28/23 12:40 AM   Result Value Ref Range    POCT Glucose 138 (H) 70 - 110 mg/dL   Urinalysis, Reflex to Urine Culture Urine, Clean Catch    Collection Time: 07/28/23  5:41 AM    Specimen: Urine   Result Value Ref Range    Specimen UA Urine, Clean Catch     Color, UA Yellow Yellow, Straw, Sarah    Appearance, UA Hazy (A) Clear    pH, UA 6.0 5.0 -  8.0    Specific Gravity, UA 1.015 1.005 - 1.030    Protein, UA Negative Negative    Glucose, UA Negative Negative    Ketones, UA Trace (A) Negative    Bilirubin (UA) Negative Negative    Occult Blood UA Negative Negative    Nitrite, UA Positive (A) Negative    Leukocytes, UA 3+ (A) Negative   Urinalysis Microscopic    Collection Time: 07/28/23  5:41 AM   Result Value Ref Range    RBC, UA 2 0 - 4 /hpf    WBC, UA >100 (H) 0 - 5 /hpf    Bacteria Occasional None-Occ /hpf    Yeast, UA Few (A) None    Microscopic Comment SEE COMMENT    APTT    Collection Time: 07/28/23  6:11 AM   Result Value Ref Range    aPTT 38.0 (H) 21.0 - 32.0 sec   Calcium, ionized    Collection Time: 07/28/23  6:11 AM   Result Value Ref Range    Ionized Calcium 1.15 1.06 - 1.42 mmol/L   CBC Auto Differential    Collection Time: 07/28/23  6:11 AM   Result Value Ref Range    WBC 1.89 (LL) 3.90 - 12.70 K/uL    RBC 2.34 (L) 4.00 - 5.40 M/uL    Hemoglobin 7.7 (L) 12.0 - 16.0 g/dL    Hematocrit 25.2 (L) 37.0 - 48.5 %     (H) 82 - 98 fL    MCH 32.9 (H) 27.0 - 31.0 pg    MCHC 30.6 (L) 32.0 - 36.0 g/dL    RDW SEE COMMENT 11.5 - 14.5 %    Platelets 57 (L) 150 - 450 K/uL    MPV 10.6 9.2 - 12.9 fL    Immature Granulocytes CANCELED 0.0 - 0.5 %    Immature Grans (Abs) CANCELED 0.00 - 0.04 K/uL    nRBC 0 0 /100 WBC    Gran % 82.0 (H) 38.0 - 73.0 %    Lymph % 11.0 (L) 18.0 - 48.0 %    Mono % 3.0 (L) 4.0 - 15.0 %    Eosinophil % 4.0 0.0 - 8.0 %    Basophil % 0.0 0.0 - 1.9 %    Platelet Estimate Decreased (A)     [place] Slight     Poik Slight     Tear Drop Cells Occasional     [place] Cells Occasional     Differential Method Manual    Comprehensive Metabolic Panel    Collection Time: 07/28/23  6:11 AM   Result Value Ref Range    Sodium 142 136 - 145 mmol/L    Potassium 3.9 3.5 - 5.1 mmol/L    Chloride 118 (H) 95 - 110 mmol/L    CO2 18 (L) 23 - 29 mmol/L    Glucose 78 70 - 110 mg/dL    BUN 4 (L) 6 - 20 mg/dL    Creatinine 0.4 (L) 0.5 - 1.4 mg/dL    Calcium 7.7 (L)  8.7 - 10.5 mg/dL    Total Protein 4.4 (L) 6.0 - 8.4 g/dL    Albumin 1.8 (L) 3.5 - 5.2 g/dL    Total Bilirubin 3.0 (H) 0.1 - 1.0 mg/dL    Alkaline Phosphatase 92 55 - 135 U/L    AST 27 10 - 40 U/L    ALT 12 10 - 44 U/L    eGFR >60.0 >60 mL/min/1.73 m^2    Anion Gap 6 (L) 8 - 16 mmol/L   Magnesium    Collection Time: 07/28/23  6:12 AM   Result Value Ref Range    Magnesium 1.6 1.6 - 2.6 mg/dL     Imaging Results              CTA Acute GI [place], Abdomen and Pelvis (Final result)  Result time 07/05/23 17:15:19      Final result by Mumtaz Garsia MD (07/05/23 17:15:19)                   Impression:      Images are degraded by significant streak and motion artifacts.    Stable large left retroperitoneal hematoma and left iliacus hematoma.  No contrast extravasation within the hematomas or bowel to indicate active arterial bleed.    Hepatic cirrhosis.  Diffuse wall thickening of the stomach and colon, which can be seen in the setting of hepatic dysfunction.  However, superimposed inflammatory processes are not excluded.    Multiple, nondilated, distended fluid-filled loops of small bowel without a definite transition point.  Stool and air seen within the colon, this is suggestive of ileus.    Small left pleural effusion with associated compressive atelectasis.    Cholelithiasis.    Cardiomegaly.    Electronically signed by resident: Emiliano Mcmahon  Date:    07/05/2023  Time:    16:29    Electronically signed by: Mumtaz Garsia MD  Date:    07/05/2023  Time:    17:15               Narrative:    EXAMINATION:  CTA ACUTE GI BLEED, ABDOMEN AND PELVIS    CLINICAL HISTORY:  GI bleed;    TECHNIQUE:  Initial noncontrast  images were obtained of the abdomen and pelvis.  CT axial angiography images were then obtained from the lung bases to the pubic symphysis following the intravenous administration of 100 of Omnipaque 350 with delayed images obtained per GI bleeding protocol.  Sagittal and coronal reformats were  provided.    COMPARISON:  CTA GI bleed 06/13/2023    FINDINGS:  Images are degraded by significant streak and motion artifacts.    Lungs: Interval decrease in size of the small right pleural effusion with associated compressive atelectasis.  Resolution of the left pleural effusion.    Heart: Enlarged.  No pericardial effusion.    Liver: Nodular contour of the liver.  No focal abnormality.    Gallbladder: Multiple calcified gallstones.  No pericholecystic inflammatory changes.    Bile ducts: No intrahepatic or extrahepatic biliary ductal dilatation.    Spleen: Normal size.    Pancreas: No mass. No ductal dilatation. No peripancreatic fat stranding.    Adrenals: No significant abnormality    Renal/Ureters: Bilateral cortical thinning.  No hydronephrosis.  Proximal ureters are normal caliber.  The distal ureters are difficult to evaluate due to streak artifact.  Bladder is decompressed with Saldaña catheter in place.    Reproductive: Uterus appears without significant abnormality.  No adnexal mass, noting limited evaluation due to significant streak artifact.    Stomach/Bowel: Stomach is distended with ingested contents with thickened rugal folds.  Small bowel is distended with multiple nondilated fluid-filled loops.  No transition point.  Appendix is normal.  Diffuse wall thickening throughout the colon with mild stool burden.  Diffuse wall thickening of the rectum.  No contrast extravasation to indicate active arterial bleed.    Peritoneum: Stable large left retroperitoneal hematoma measuring 11.5 x 7.8 x 12.6 cm.  No contrast extravasation to indicate active bleed within the hematoma.  Additional smaller hematoma anterior to the left iliacus muscle, which appears stable compared to prior CTA.  No free fluid. No intraperitoneal free air.    Lymph Nodes: Multiple prominent mesenteric and retroperitoneal lymph nodes.    Vasculature: Abdominal aorta tapers normally.  Mild atherosclerosis of the abdominal aorta and its  branches.    Bones: Bony mineralization is diminished.  Left hip arthroplasty.  Generalized body wall edema.    Soft Tissues: No significant abnormality.                                       CT Head Without Contrast (Final result)  Result time 07/05/23 15:10:53      Final result by Viktor Desouza MD (07/05/23 15:10:53)                   Impression:      No evidence of acute intracranial pathology.    Volume loss again identified.    Electronically signed by resident: Kenney Rosas  Date:    07/05/2023  Time:    14:45    Electronically signed by: Viktor Desouza  Date:    07/05/2023  Time:    15:10               Narrative:    EXAMINATION:  CT HEAD WITHOUT CONTRAST    CLINICAL HISTORY:  Mental status change, unknown cause;    TECHNIQUE:  Low dose axial CT images obtained throughout the head without the use of intravenous contrast.  Axial, sagittal and coronal reconstructions were performed.    COMPARISON:  CT head 06/22/2023    FINDINGS:  Intracranial compartment:    Volume loss without evidence of hydrocephalus.    No parenchymal  hemorrhage, edema, mass effect or major vascular distribution infarct.    Decreased attenuation in the periventricular white matter is nonspecific but may reflect mild chronic small vessel ischemic change vs other encephalopathy.    No extra-axial blood or fluid collections.    Skull/extracranial contents (limited evaluation):    No displaced calvarial fracture.    The visualized sinuses and mastoid air cells are clear.                                       X-Ray Chest AP Portable (Final result)  Result time 07/05/23 13:09:11      Final result by Americo Reyes MD (07/05/23 13:09:11)                   Impression:      No significant detrimental interval change in the appearance of the chest since 06/25/2023 is appreciated, with significant improvement as noted above.  No post procedure pneumothorax.      Electronically signed by: Americo Reyes MD  Date:    07/05/2023  Time:    13:09                Narrative:    EXAMINATION:  XR CHEST AP PORTABLE    TECHNIQUE:  One view was obtained.  Note is made of the fact that the thorax is obscured to some extent by opacities external to the patient, particularly defibrillator pads.    COMPARISON:  Comparison is made to the most recent prior chest radiograph of 06/25/2023.    FINDINGS:  Endotracheal tube has been placed, its tip lying just superior to the apex of the aortic arch, well above the katharina.  Left jugular origin vascular catheter is now seen, its tip in the superior vena cava near the junction of the right and left brachiocephalic veins.  Allowing for magnification of the cardiomediastinal silhouette related to projection, the heart is not significantly enlarged.  Opacity in both inferior hemithoraces seen on the previous examination has resolved, consistent with clearing of airspace consolidation in both mid/lower lung zones and resolution of previously present bilateral pleural fluid.  Lung zones are currently clear and free of significant airspace consolidation or volume loss.  No pleural fluid of any substantial volume is seen on either side.  No pneumothorax.

## 2023-07-28 NOTE — PT/OT/SLP PROGRESS
Occupational Therapy   Co-Treatment    Name: Marely Hamilton  MRN: [id number]  Admitting Diagnosis:  Gastrointestinal hemorrhage associated with duodenal ulcer  17 Days Post-Op    Co-treat performed due to acuity and complexity of pt's medical status with 2 skilled disciplines needed to optimize pts occupational performance and  decreased activity tolerance.     Recommendations:     Discharge Recommendations: nursing facility, skilled  Discharge Equipment Recommendations:  to be determined by next level of care  Barriers to discharge:       Assessment:     Marely Hamilton is a 58 y.o. female with a medical diagnosis of Gastrointestinal hemorrhage associated with duodenal ulcer.  Pt with poor endurance and activity tolerance this date.  Pt with continued impaired cognition, noted with random association words throughout session. Pt with fair + EOB balance requiring Max- Sup 2/2 to R lean requiring cues for BUE support for improved balance. She presents with deficits listed below. Performance deficits affecting function are weakness, impaired endurance, impaired self care skills, impaired functional mobility, gait instability, impaired balance, impaired cognition, decreased coordination, decreased upper extremity function, decreased lower extremity function, decreased safety awareness, impaired cardiopulmonary response to activity.     Rehab Prognosis:  Good; patient would benefit from acute skilled OT services to address these deficits and reach maximum level of function.       Plan:     Patient to be seen 3 x/week to address the above listed problems via self-care/home management, therapeutic activities, therapeutic exercises, cognitive retraining  Plan of Care Expires: 08/11/23  Plan of Care Reviewed with: patient    Subjective     Chief Complaint: fatigue  Patient/Family Comments/goals: Lay down  Pain/Comfort:  Pain Rating 1: 0/10    Objective:     Communicated with: NEHAL Figueroa prior to session.  Patient found HOB  elevated with PureWick upon OT entry to room.    General Precautions: Standard, fall, aspiration    Orthopedic Precautions:N/A  Braces: N/A  Respiratory Status: Room air,      Occupational Performance:     Bed Mobility:    Patient completed Rolling/Turning to Left with  maximal assistance and 2 persons  Patient completed Scooting/Bridging with maximal assistance and 2 persons  Patient completed Supine to Sit with maximal assistance and 2 persons  Patient completed Sit to Supine with maximal assistance and 2 persons     Functional Mobility/Transfers:  Deferred this date 2/2 to Pt fatigue.  Pt tolerated 15 minutes EOB with Max- Sup for balance    Activities of Daily Living:  Grooming: moderate assistance for facial hygiene, Pt able to initiate task with redirection. Pt requiring Mod A for reaching all aspects of face and mouth.       [facility] 6 Click ADL: 10    Treatment & Education:  Role of OT and OT POC  Fall Prevention/Safety Awareness Training - RW management during functional mobility and standing ADLs to decrease risk of falls  Upperbody Dressing   Lowerbody Dressing on figure 4 technique to corey/doff clothing  to decrease forward trunk flexion and maintain balance while seated EOB  Transfer Training on use of grab bars during t/fs to reduce fall risk.   Caregiver Education  BUE Exercises to increase strength/ROM  LE Exercises  Functional Mobility Training  Caregiver Education  Educated  Pt on (setting) placement and therapy services provided.  Educated on OOB activity and impact on increasing functional independence.  Educated on importance of progressive mobilization to increase strength and endurance.      Patient left HOB elevated with all lines intact and call button in reach    GOALS:   Multidisciplinary Problems       Occupational Therapy Goals          Problem: Occupational Therapy    Goal Priority Disciplines Outcome Interventions   Occupational Therapy Goal     OT, PT/OT Ongoing, Progressing     Description: Goals to be met by: 7/28/2023     Patient will increase functional independence with ADLs by performing:    UE Dressing with Minimal Assistance.  LE Dressing with Moderate Assistance.  Grooming while EOB with Minimal Assistance.  Toileting from bedside commode with Maximum Assistance for hygiene and clothing management.   Supine to sit with Moderate Assistance.  Stand pivot transfers with Maximum Assistance with or without AD.  Toilet transfer to bedside commode with Maximum Assistance with or without AD.                         Time Tracking:     OT Date of Treatment: 07/28/23  OT Start Time: 1113  OT Stop Time: 1140  OT Total Time (min): 27 min    Billable Minutes:Self Care/Home Management 16  Therapeutic Activity 10    OT/DC: OT          7/28/2023

## 2023-07-28 NOTE — PLAN OF CARE
Problem: Infection  Goal: Absence of Infection Signs and Symptoms  Outcome: Ongoing, Progressing     Problem: Adult Inpatient Plan of Care  Goal: Plan of Care Review  Outcome: Ongoing, Progressing  Goal: Patient-Specific Goal (Individualized)  Outcome: Ongoing, Progressing  Goal: Absence of Hospital-Acquired Illness or Injury  Outcome: Ongoing, Progressing  Goal: Optimal Comfort and Wellbeing  Outcome: Ongoing, Progressing  Goal: Readiness for Transition of Care  Outcome: Ongoing, Progressing     Problem: Fluid and Electrolyte Imbalance (Acute Kidney Injury/Impairment)  Goal: Fluid and Electrolyte Balance  Outcome: Ongoing, Progressing     Problem: Oral Intake Inadequate (Acute Kidney Injury/Impairment)  Goal: Optimal Nutrition Intake  Outcome: Ongoing, Progressing     Problem: Renal Function Impairment (Acute Kidney Injury/Impairment)  Goal: Effective Renal Function  Outcome: Ongoing, Progressing     Problem: Impaired Wound Healing  Goal: Optimal Wound Healing  Outcome: Ongoing, Progressing     Problem: Fall Injury Risk  Goal: Absence of Fall and Fall-Related Injury  Outcome: Ongoing, Progressing     Problem: Restraint, Nonbehavioral (Nonviolent)  Goal: Absence of Harm or Injury  Outcome: Ongoing, Progressing     Problem: Nutrition Impairment (Mechanical Ventilation, Invasive)  Goal: Optimal Nutrition Delivery  Outcome: Ongoing, Progressing     Problem: Skin and Tissue Injury (Mechanical Ventilation, Invasive)  Goal: Absence of Device-Related Skin and Tissue Injury  Outcome: Ongoing, Progressing     Problem: Ventilator-Induced Lung Injury (Mechanical Ventilation, Invasive)  Goal: Absence of Ventilator-Induced Lung Injury  Outcome: Ongoing, Progressing     Problem: Communication Impairment (Artificial Airway)  Goal: Effective Communication  Outcome: Ongoing, Progressing     Problem: Device-Related Complication Risk (Artificial Airway)  Goal: Optimal Device Function  Outcome: Ongoing, Progressing     Problem: Skin  and Tissue Injury (Artificial Airway)  Goal: Absence of Device-Related Skin or Tissue Injury  Outcome: Ongoing, Progressing     Problem: Skin Injury Risk Increased  Goal: Skin Health and Integrity  Outcome: Ongoing, Progressing    Pt remained free of falls or injuries during shift. No signs of acute distress noted during shift.

## 2023-07-28 NOTE — PROGRESS NOTES
Jimmy Phillip - Telemetry [facility] Medicine  Progress Note    Patient Name: Marely Hamilton  MRN: [id number]  Patient Class: IP- Inpatient   Admission Date: 7/5/2023  Length of Stay: 23 days  Attending Physician: Seth Noyola MD  Primary Care Provider: Viktor Ross MD        Subjective:     Principal Problem:Gastrointestinal hemorrhage associated with duodenal ulcer        HPI:  Marely Hamilton is a 58 y.o. female with history of alcoholic cirrhosis, seizure disorder, DVT and IJ thrombus with recent admission for large retroperitoneal hemorrhage requiring IR embolization and IVC filter placement who presents for hematochezia. Onset this morning at [facility], alida blood per rectum per chart, sent to ED. Initially normotensive but then severe hypotension prompted initiation of levophed and intubation. Hgb 6.8, 2u pRBC given + 1u FFP ordered. Hgb 8.5 on 7/2. On levo and vaso currently. K 6.6 but renal function at baseline. Repeat pending. No prior EGDs on record.          Overview/Hospital Course:    Patient was seen at bedside, hematochezia still present post op by IR. Patient has required many transfusions of pRBCs and platelets due to ongoing down trending of Hgb. No clear source of ongoing hemorrhage by imaging. Patient has continued to require pressors to maintain MAP >65. Discussed with GI and IR regarding active bleeding post op, IR indicated there is not much else to do from their end bc they embolized a significant part of GDA. Pt is off of pressor requirement and not actively bleeding. GI re evaluated pt via EGD and found no sources of active bleeding. Pt is extubated and currently on BIPAP requirement due to de saturation in the 80's. Pt is to be seen by speech and PT/OT. Clear mucinous secretions via suction, chest physiotherapy, and cough assist device. Nebs to help with breathing as well. Pt is off BIPAP and currently on comfort flow. Triple therapy (amoxicillin, clarithromycin) started for 14 days  to treat for positive H pylori serology. Pt also had a NG tube placed so we may begin tube feedings. CxR, EKG, and ABG displayed no reason for tachycardia. Pt becomes anxious when seen by different provider teams. Remove art line and consult for midline placement. Continue to wean comfort flow as tolerated. Ensure pt is working with PT/OT/Speech for continuous improvement. Consult psych regarding patient history of bipolar disorder.         Interval History/Significant Events: Wean comfort flow as tolerated. Ensure patient is working with PT/OT/Speech for continuous improvement. Consult psych regarding psych history     7/16   history of alcoholic cirrhosis, seizure disorder, DVT and IJ thrombus with recent admission for large retroperitoneal hemorrhage requiring IR embolization and IVC filter placement who presents for hematochezia. s/p GI and IR in which they clipped (GI) and embolized (IR) possible sources of duodenal ulcer bleeding.  Hb stable  s/p extubation. sats 92% on 5L of NC.  CX ray - Detrimental changes since the previous examination including interval increase in pulmonary vascularity and bilateral diffuse interstitial pulmonary parenchymal opacification, progression of abnormal opacification at the left lung base, and development of small right-sided pleural effusion.   findings would be consistent with congestive heart failure.PT/OT recs SNF. BNP , procalcitonin and Echo.  SLP recs NPO .  On NGT feeds. psychiatry following for bipolar disorders.  Recs Zyprexa 5 mg QHS . Ammonia 48 WNL. AAOX 1. Hepatology consulted for cirrhosis. UA ordered   7/17 Saldaña removed. UA +. UC pending. started on IV ceftriaxone. ammonia at 59. on B/L UE mittens as pulled of NGT. Hepatology eval. resumed lactulose . discussed with sister and updated clinical status   7/18  MBS today - started puree nectar thick liquids.  on oxygen 3L via NC.   wean  as tolerated . UC -YEAST 10,000 - 49,999 cfu/ml Identification pending No  other significant isolate. hydroxyzine  PRN discontinued  due to it not being safe in delirium. oriented to person, hospital and year   7/19 SLP recs - tolerating Puree Diet - IDDSI Level 4, Mildly thick/Nectar thick liquids - IDDSI Level 2 .stool output 600ml/ monitor on lactulose . improved mentation AAOX 3  7/26 Await NH placement. Little improvement in functional status. Tunneled fistula seen near rectum. Low grade temp.  7/26 K replaced. Await NH placement. Little improvement in functional status. Tunneled fistula seen near rectum. Low grade temp. Consulted psych for recs concerning discharge.   7/27 Sister wanted her started back on lithium but psychiatry recommended  increasing Zyprexa 7.5mg QHS. EKG to monitor QtcDiscontinue zyprexa if Qtc >480. EKG QTC 493ms.  Zyprexa resumed at  zyprexa 5 nightly per psychiatry . Urinary retention this AM s/p straight cath   7/28 continues with confusion. UA + . UC pending. continue ceftriaxone. no fistula found on scaral exam with wound care.  Repeat EKG today to monitor Qtc. Saldaña catheter placed for urinary retention .  started  lithium 300 mg  nightly due to concern for manic episode          Review of Systems:   Pain scale:   Constitutional:  fever,  chills, headache, vision loss, hearing loss, weight loss, Generalized weakness, falls, loss of smell, loss of taste, poor appetite,  sore throat  Respiratory: cough, shortness of breath.   Cardiovascular: chest pain, dizziness, palpitations, orthopnea, swelling of feet, syncope  Gastrointestinal: nausea, vomiting, abdominal pain, diarrhea, black stool,  blood in stool, change in bowel habits  Genitourinary: hematuria, dysuria, urgency, frequency  Integument/Breast: rash,  pruritis  Hematologic/Lymphatic: easy bruising, lymphadenopathy  Musculoskeletal: arthralgias , myalgias, back pain, neck pain, knee pain  Neurological: confusion, seizures, tremors, slurred speech  Behavioral/Psych:  depression, anxiety, auditory or  visual hallucinations     OBJECTIVE:     Physical Exam:  Body mass index is 24.69 kg/m².    Constitutional: Appears well-developed and well-nourished.   Head: Normocephalic and atraumatic. + icterus  Neck: Normal range of motion. Neck supple.   Cardiovascular: Normal heart rate.  Regular heart rhythm.  Pulmonary/Chest: Effort normal.   Abdominal: No distension.  No tenderness.    Musculoskeletal: Normal range of motion.   Neurological: Alert and  oriented to person, hospital and year.  follows simple commands   Skin: Skin is warm and dry. ehcymoses on the upper chest    Psychiatric: Normal mood and affect. Behavior is normal.                  Vital Signs  Temp: 99.4 °F (37.4 °C) (07/28/23 1548)  Pulse: (Abnormal) 121 (07/28/23 1548)  Resp: 20 (07/28/23 1548)  BP: 135/61 (07/28/23 1548)  SpO2: (Abnormal) 92 % (07/28/23 1548)     24 Hour VS Range    Temp:  [97.6 °F (36.4 °C)-99.4 °F (37.4 °C)]   Pulse:  []   Resp:  [16-20]   BP: (116-138)/(60-70)   SpO2:  [92 %-98 %]     Intake/Output Summary (Last 24 hours) at 7/28/2023 1658  Last data filed at 7/28/2023 0619  Gross per 24 hour   Intake 50 ml   Output 552 ml   Net -502 ml         I/O This Shift:  No intake/output data recorded.    Wt Readings from Last 3 Encounters:   07/28/23 61.2 kg (135 lb)   07/03/23 44.7 kg (98 lb 8.7 oz)   06/29/23 59.9 kg (132 lb 0.9 oz)       I have personally reviewed the vitals and recorded Intake/Output     Laboratory/Diagnostic Data:    CBC/Anemia Labs: Coags:    Recent Labs   Lab 07/26/23  0519 07/27/23  0602 07/28/23  0611   WBC 2.52* 2.16* 1.89*   HGB 8.1* 8.4* 7.7*   HCT 25.5* 26.6* 25.2*   PLT 51* 62* 57*   * 106* 108*   RDW SEE COMMENT SEE COMMENT SEE COMMENT    Recent Labs   Lab 07/23/23  0434 07/24/23  0513 07/26/23  0519 07/27/23  0602 07/28/23  0611   INR 1.5*  --   --   --   --    APTT 33.6*   < > 33.1* 35.0* 38.0*    < > = values in this interval not displayed.        Chemistries: ABG:   Recent Labs   Lab  07/26/23  0519 07/27/23  0601 07/27/23  0602 07/28/23  0611 07/28/23  0612     --  142 142  --    K 3.1*  --  3.2* 3.9  --    *  --  115* 118*  --    CO2 18*  --  17* 18*  --    BUN 5*  --  5* 4*  --    CREATININE 0.4*  --  0.3* 0.4*  --    CALCIUM 7.6*  --  7.6* 7.7*  --    PROT 4.8*  --  5.0* 4.4*  --    BILITOT 3.7*  --  4.1* 3.0*  --    ALKPHOS 90  --  94 92  --    ALT 12  --  14 12  --    AST 28  --  27 27  --    MG 1.6 1.6  --   --  1.6    No results for input(s): PH, PCO2, PO2, HCO3, POCSATURATED, BE in the last 168 hours.       POCT Glucose: HbA1c:    Recent Labs   Lab 07/27/23  0612 07/27/23  1127 07/27/23  1532 07/28/23  0040 07/28/23  1220 07/28/23  1552   POCTGLUCOSE 98 268* 173* 138* 97 109    Hemoglobin A1C   Date Value Ref Range Status   05/29/2023 <4.0 (A) 4.0 - 5.6 % Final     Comment:     ADA Screening Guidelines:  5.7-6.4%  Consistent with prediabetes  >or=6.5%  Consistent with diabetes    High levels of fetal hemoglobin interfere with the HbA1C  assay. Heterozygous hemoglobin variants (HbS, HgC, etc)do  not significantly interfere with this assay.   However, presence of multiple variants may affect accuracy.  Falsely low HA1c results may be observed in patients   with clinical conditions that shorten erythrocyte   life span or decrease mean erythrocyte age.  HA1c   may not accurately reflect glycemic control when   clinical conditions that affect erythrocyte survival   are present. Fructosamine may be used as an alternate  measurement of glycemic control.     01/22/2021 4.1 4.0 - 5.6 % Final     Comment:     ADA Screening Guidelines:  5.7-6.4%  Consistent with prediabetes  >or=6.5%  Consistent with diabetes  High levels of fetal hemoglobin interfere with the HbA1C  assay. Heterozygous hemoglobin variants (HbS, HgC, etc)do  not significantly interfere with this assay.   However, presence of multiple variants may affect accuracy.     12/10/2020 4.6 4.0 - 5.6 % Final     Comment:     ADA  Screening Guidelines:  5.7-6.4%  Consistent with prediabetes  >or=6.5%  Consistent with diabetes  High levels of fetal hemoglobin interfere with the HbA1C  assay. Heterozygous hemoglobin variants (HbS, HgC, etc)do  not significantly interfere with this assay.   However, presence of multiple variants may affect accuracy.          Cardiac Enzymes: Ejection Fractions:    No results for input(s): CPK, CPKMB, MB, TROPONINI in the last 72 hours. EF   Date Value Ref Range Status   07/16/2023 65 % Final   06/03/2023 70 % Final          Recent Labs   Lab 07/28/23  0541   COLORU Yellow   APPEARANCEUA Hazy*   PHUR 6.0   SPECGRAV 1.015   PROTEINUA Negative   GLUCUA Negative   KETONESU Trace*   BILIRUBINUA Negative   OCCULTUA Negative   NITRITE Positive*   LEUKOCYTESUR 3+*   RBCUA 2   WBCUA >100*   BACTERIA Occasional         Procalcitonin (ng/mL)   Date Value   06/16/2023 0.26 (H)     Lactate (Lactic Acid) (mmol/L)   Date Value   07/16/2023 1.4   07/16/2023 1.4   07/06/2023 1.5   07/05/2023 8.0 (HH)   06/11/2023 6.2 (HH)     BNP (pg/mL)   Date Value   07/16/2023 174 (H)     No results found for: CRP, SEDRATE  D-Dimer (mg/L FEU)   Date Value   07/07/2023 2.22 (H)     Ferritin (ng/mL)   Date Value   05/18/2023 110   10/23/2015 412 (H)   09/19/2015 599 (H)   09/19/2015 599 (H)   07/23/2015 551 (H)   06/17/2015 612 (H)     LD (U/L)   Date Value   06/05/2023 249   05/31/2023 426 (H)   09/19/2015 280 (H)     Troponin I (ng/mL)   Date Value   06/13/2023 0.029 (H)   06/13/2023 0.030 (H)   05/18/2023 0.035 (H)   05/18/2023 0.037 (H)   05/05/2023 <0.006   01/25/2023 <0.006     CPK (U/L)   Date Value   05/05/2023 47   01/25/2023 27   06/14/2020 23 (L)     CK (U/L)   Date Value   04/15/2023 98     Results for orders placed or performed during the hospital encounter of 05/18/23   Vitamin D   Result Value Ref Range    Vit D, 25-Hydroxy 15 (L) 30 - 96 ng/mL     SARS-CoV2 (COVID-19) Qualitative PCR (no units)   Date Value   07/26/2023 Not  Detected   06/30/2023 Not Detected   02/14/2023 Not Detected     SARS-CoV-2 RNA, Amplification, Qual (no units)   Date Value   05/28/2023 Negative   11/02/2021 Negative   01/21/2021 Negative   01/11/2021 Negative   11/21/2020 Negative   06/16/2020 Negative       Microbiology labs for the last week  Microbiology Results (last 7 days)       Procedure Component Value Units Date/Time    Urine culture [[id number]] Collected: 07/28/23 0541    Order Status: No result Specimen: Urine Updated: 07/28/23 0607            Reviewed and noted in plan where applicable- Please see chart for full lab data.    Lines/Drains:       Peripheral IV - Single Lumen 07/12/23 1148 20 G;1 3/4 in Left Forearm (Active)   Site Assessment Clean;Dry;Intact 07/16/23 0701   Extremity Assessment Distal to IV No abnormal discoloration 07/16/23 0701   Line Status No blood return;Flushed;Saline locked 07/16/23 0701   Dressing Status Dry;Clean;Intact 07/16/23 0701   Dressing Intervention Integrity maintained 07/16/23 0701   Dressing Change Due 07/16/23 07/16/23 0701   Site Change Due 07/16/23 07/16/23 0701   Reason Not Rotated Not due 07/16/23 0701   Number of days: 3            Peripheral IV - Single Lumen 07/14/23 1540 20 G;1 3/4 in Right Upper Arm (Active)   Site Assessment Intact;Clean;Dry 07/16/23 0701   Extremity Assessment Distal to IV No abnormal discoloration 07/16/23 0701   Line Status Blood return noted;Flushed;Saline locked 07/16/23 0701   Dressing Status Dry;Intact;Clean 07/16/23 0701   Dressing Intervention Integrity maintained 07/16/23 0701   Dressing Change Due 07/18/23 07/16/23 0701   Site Change Due 07/18/23 07/16/23 0701   Reason Not Rotated Not due 07/16/23 0701   Number of days: 1            NG/OG Tube 07/13/23 1311 [place] sump 18 Fr. Right nostril (Active)   Placement Check placement verified by aspirate characteristics 07/16/23 0701   Tolerance no signs/symptoms of discomfort 07/16/23 0701   Securement secured to nostril center w/  "adhesive device 07/16/23 0701   Clamp Status/Tolerance unclamped 07/16/23 0701   Suction Setting/Drainage Method suction at the bedside 07/16/23 0701   Insertion Site Appearance no redness, warmth, tenderness, skin breakdown, drainage 07/16/23 0701   Flush/Irrigation flushed w/;water 07/16/23 0502   Feeding Type continuous;by pump 07/16/23 0701   Feeding Action feeding continued 07/16/23 0701   Current Rate (mL/hr) 50 mL/hr 07/16/23 0701   Goal Rate (mL/hr) 50 mL/hr 07/16/23 0701   Intake (mL) 20 mL 07/15/23 0800   Water Bolus (mL) 250 mL 07/16/23 0501   Rate Formula Tube Feeding (mL/hr) 20 mL/hr 07/14/23 0400   Formula Name Isosource 1.5 07/16/23 0701   Intake (mL) - Formula Tube Feeding 50 07/16/23 0700   Residual Amount (ml) 30 ml 07/15/23 2337   Number of days: 2            Urethral Catheter 07/05/23 1332 Double-lumen (Active)   Site Assessment Clean;Intact 07/16/23 0701   Collection Container Urimeter 07/16/23 0701   Securement Method secured to top of thigh w/ adhesive device 07/16/23 0701   Catheter Care Performed yes 07/16/23 0701   Reason for Continuing Urinary Catheterization Non-healing sacral/perineal wound 07/16/23 0701   CAUTI Prevention Bundle Securement Device in place with 1" slack;Intact seal between catheter & drainage tubing;Drainage bag/urimeter off the floor;Sheeting clip in use;No dependent loops or kinks;Drainage bag/urimeter not overfilled (<2/3 full);Drainage bag/urimeter below bladder 07/16/23 0701   Output (mL) 75 mL 07/16/23 0738   Number of days: 10            Fecal Incontinence  07/09/23 2300 (Active)   $ Fecal Management Supplies Fecal Management System (Supply) 07/14/23 1954   Application fecal incontinence  in place 07/16/23 0701   Drainage Method attached to drainage bag 07/16/23 0701   Securement to gravity 07/16/23 0701   Skin cleansed, skin barrier applied 07/16/23 0701   Tolerance no signs/symptoms of discomfort 07/16/23 0701   Stool (mL) 200 mL 07/16/23 0501 "   Number of days: 6       Imaging  ECG Results              EKG 12-lead (Final result)  Result time 07/05/23 13:56:05      Final result by Interface, Lab In [facility] (07/05/23 13:56:05)               Narrative:    Test Reason : E87.5,    Vent. Rate : 103 BPM     Atrial Rate : 103 BPM     P-R Int : 132 ms          QRS Dur : 076 ms      QT Int : 342 ms       P-R-T Axes : 073 054 084 degrees     QTc Int : 448 ms    Age and gender specific analysis  Sinus tachycardia  Low voltage QRS  Low anterior forces and R wave progression  Possibly acute STEMI    ACUTE MI / STEMI    Abnormal ECG  When compared with ECG of 05-JUL-2023 11:07,  No significant change was found  Confirmed by Abimael CLEMENTS MD ([id number]) on 7/5/2023 1:55:55 PM    Referred By: [email or web address]   SELF           Confirmed By:Abimael CLEMENTS MD                                   EKG 12-lead (Final result)  Result time 07/05/23 14:01:06      Final result by Interface, Lab In [facility] (07/05/23 14:01:06)               Narrative:    Test Reason : K92.2,    Vent. Rate : 096 BPM     Atrial Rate : 096 BPM     P-R Int : 114 ms          QRS Dur : 066 ms      QT Int : 352 ms       P-R-T Axes : 078 090 065 degrees     QTc Int : 444 ms    Normal sinus rhythm  Vertical axis  Otherwise normal ECG  When compared with ECG of 27-JUN-2023 12:05,  T wave inversion no longer evident in Anterior leads  Confirmed by Cameron Hodge MD ([id number]) on 7/5/2023 2:01:00 PM    Referred By: System System           Confirmed By:Cameron Hodge MD                                  Results for orders placed during the hospital encounter of 05/28/23    Echo    Interpretation Summary  · The left ventricle is normal in size with hyperdynamic systolic function. The estimated ejection fraction is 70%.  · Normal right ventricular size with normal right ventricular systolic function.  · Normal left ventricular diastolic function.  · Mild left atrial enlargement.  · Mechanically ventilated; cannot use inferior caval vein  diameter to estimate central venous pressure.  · Trivial pericardial effusion.  · There is a left pleural effusion.      X-Ray Chest AP Portable  Narrative: EXAMINATION:  XR CHEST AP PORTABLE    CLINICAL HISTORY:  Fever;    TECHNIQUE:  Single frontal view of the chest was performed.    COMPARISON:  No 07/16/2023 ne    FINDINGS:  Mild cardiomegaly.  Mild edema.  Small ill-defined opacities noted adjacent to the left hilum similar to the previous study.  Opacification at the left lung base probably atelectatic changes and  smaller amount of pleural effusion.  Impression: No significant changes    Electronically signed by: Americo Graf MD  Date:    07/25/2023  Time:    12:17      Labs, Imaging, EKG and Diagnostic results from 7/28/2023 were reviewed.    Medications:  Medication list was reviewed and changes noted under Assessment/Plan.  No current facility-administered medications on file prior to encounter.     Current Outpatient Medications on File Prior to Encounter   Medication Sig Dispense Refill    ARIPiprazole (ABILIFY) 20 MG Tab Take 5 mg by mouth once daily.      folic acid (FOLVITE) 1 MG tablet Take 1 tablet (1 mg total) by mouth once daily. (Patient taking differently: Take 1 mg by mouth every evening.) 30 tablet 2    furosemide (LASIX) 20 MG tablet Take 20 mg by mouth once daily.      lactulose (CHRONULAC) 10 gram/15 mL solution Take 10 g by mouth 3 (three) times daily.      lithium carbonate 150 MG capsule Take 1 capsule (150 mg total) by mouth every evening. (Patient taking differently: Take 150 mg by mouth 2 (two) times a day.) 30 capsule 11    magnesium oxide (MAG-OX) 400 mg (241.3 mg magnesium) tablet Take by mouth once daily.      pantoprazole (PROTONIX) 40 MG tablet Take 40 mg by mouth once daily.      propranoloL (INDERAL) 40 MG tablet Take 40 mg by mouth once daily.      QUEtiapine (SEROQUEL) 300 MG Tab Take 100 mg by mouth every evening.      sodium bicarbonate 650 MG tablet Take 650 mg by mouth  Daily.      spironolactone (ALDACTONE) 50 MG tablet Take 1 tablet (50 mg total) by mouth once daily. 90 tablet 3    zonisamide (ZONEGRAN) 100 MG Cap Take 100 mg by mouth once daily.      levETIRAcetam (KEPPRA) 1000 MG tablet Take 1,000 mg by mouth 2 (two) times daily.      OLANZapine (ZYPREXA) 10 MG tablet Take 1 tablet (10 mg total) by mouth every evening. (Patient not taking: Reported on 7/6/2023) 30 tablet 11    rifAXIMin (XIFAXAN) 550 mg Tab Take 1 tablet (550 mg total) by mouth 2 (two) times daily. (Patient not taking: Reported on 7/6/2023) 180 tablet 3     Scheduled Medications:  albuterol-ipratropium, 3 mL, Nebulization, Q6H WAKE  cefTRIAXone (ROCEPHIN) IVPB, 1 g, Intravenous, Q24H  lactulose, 10 g, Oral, TID  levetiracetam, 1,000 mg, Oral, BID  lithium, 300 mg, Oral, QHS  OLANZapine, 5 mg, Oral, QHS  pantoprazole, 40 mg, Oral, BID      PRN: dextrose 10%, dextrose 10%, glucagon (human recombinant), insulin aspart U-100, oxyCODONE, sodium chloride 0.9%  Infusions:       Estimated Creatinine Clearance: 133.5 mL/min (A) (based on SCr of 0.4 mg/dL (L)).    Assessment/Plan:      * Gastrointestinal hemorrhage associated with duodenal ulcer    as  above        Urinary retention  7/27  2 episodes. s/p straight cath   7/28 Saldaña catheter placed for urinary retention         Irritant contact dermatitis due to fecal, urinary or dual incontinence  wound care eval      Dysphagia  7/18  MBS today -started puree nectar thick liquids.        Hypoxia   7/16 sats 92% on 5L of NC.  CX ray - Detrimental changes since the previous examination including interval increase in pulmonary vascularity and bilateral diffuse interstitial pulmonary parenchymal opacification, progression of abnormal opacification at the left lung base, and development of small right-sided pleural effusion.   findings would be consistent with congestive heart failure.PT/OT recs SNF. BNP , procalcitonin and Echo.   7/27 Sats 97% on RA    H. pylori infection     positive serology for H pylori, begin triple therapy with clarithromycin,amoxicillin, and PPI for 14 days       Hematochezia    - GI EGD clipped duodenal ulcers for IR reference  - Transfuse blood products for active bleeding   - trend CBC and follow  - IR embolized GDA for duodenal hyperemia   -- GI re evaluated site of duodenal ulcers, no noted active bleeding  -- Continue PPI BID for 8 weeks, then once daily after that  --Pt had positive serology for H pylori, had triple therapy with clarithromycin,amoxicillin, and PPI for 14 days      Acute blood loss anemia     - Maintain IV access with 2 large bore Ivs  - Intravascular resuscitation/support with IVFs   - Hold all NSAIDs and anticoagulants, unless contraindicated.  - Please correct any coagulopathy with platelets and FFP for goal of platelets >50K and INR <2.0  - Please notify GI team if there is significant change in patient's clinical status  - To date, patient has required at least 21 units of pRBCs to maintain Hgb >7     7/16     Patient's with macrocytic anemia.. Hemoglobin stable. Etiology likely due to acute blood loss.  Current CBC reviewed-    Recent Labs   Lab 07/25/23  0248 07/26/23  0519 07/27/23  0602   HGB 7.6* 8.1* 8.4*         Component Value Date/Time     (H) 07/27/2023 0602    RDW SEE COMMENT 07/27/2023 0602    IRON 132 05/18/2023 1527    FERRITIN 110 05/18/2023 1527    RETIC 3.9 (H) 06/05/2023 0935    FOLATE 11.5 07/17/2023 0619    [id number] 07/17/2023 0619    OCCULTBLOOD Negative 09/19/2015 0137     Monitor CBC and transfuse if H/H drops below 7/21.        Retroperitoneal bleed  Retroperitoneum: No significant adenopathy.  Similar sized left retroperitoneal hematoma measuring 11 x 9 cm (series 5, image 100; previously 11 x 9 cm).  The left iliacus collection also measures similar to prior at 5.4 cm (series 5, image 127; previously 5.6 cm).  Layering hyperdensity within these collections suggesting different ages in blood  products.  No significant volume active extravasation into these collections  - CT-A demonstrated stable retroperitoneal hematoma. No need for intervention at this time.          Supratherapeutic INR  patient with INR   Recent Labs   Lab 07/23/23  0434   INR 1.5*   . INR is supratherapeutic. secondary to coagulopathy from liver disease.monitor      Hypokalemia  replaced       UTI (urinary tract infection)  7/17 Saldaña removed. UA +. UC pending. started on IV ceftriaxone.  7/18 UC -YEAST 10,000 - 49,999 cfu/ml Identification pending No other significant isolate  7/28 continues with confusion. UA + . UC pending. continue ceftriaxone. no fistula found on sacral exam with wound care.     Bipolar 1 disorder     - Consult psych regarding Bipolar 1 disorder history  7/27 Sister wanted her started back on lithium but psychiatry recommended  increasing Zyprexa 7.5mg QHS. EKG to monitor Qtc.Discontinue zyprexa if Qtc >480.  EKG QTC 493ms.  Zyprexa swtiched to 5mg HS  7/28  started  lithium 300 mg  nightly due to concern for manic episode       Hepatic encephalopathy   ammonia 190s on admit -->90s . AAOX 1. no lactulose give due to GI bleed  repeat ammonia. Hepatolog eval  with     MELD 3.0: 19 at 7/25/2023  2:48 AM  MELD-Na: 15 at 7/25/2023  2:48 AM  Calculated from:  Serum Creatinine: 0.3 mg/dL (Using min of 1 mg/dL) at 7/25/2023  2:48 AM  Serum Sodium: 143 mmol/L (Using max of 137 mmol/L) at 7/25/2023  2:48 AM  Total Bilirubin: 3.1 mg/dL at 7/25/2023  2:48 AM  Serum Albumin: 1.8 g/dL at 7/25/2023  2:48 AM  INR(ratio): 1.5 at 7/23/2023  4:34 AM  Age at listing (hypothetical): 58 years  Sex: Female at 7/25/2023  2:48 AM  7/16 Ammonia 48 WNL. AAOX 1. Hepatology consulted for cirrhosis.    7/17  Hepatology eval. resumed lactulose.  7/18  hydroxyzine  PRN  discontinued  due to it not being safe in delirium    Severe protein-calorie malnutrition  Nutrition consulted. Most recent weight and BMI monitored-     Measurements:  Wt  Readings from Last 1 Encounters:   07/27/23 61.2 kg (135 lb)   Body mass index is 24.69 kg/m².    Patient has been screened and assessed by RD.    Malnutrition Type:  Context: social/environmental circumstances  Level: severe    Malnutrition Characteristic Summary:  Energy Intake (Malnutrition): less than or equal to 75% for greater than or equal to 1 month  Subcutaneous Fat (Malnutrition): severe depletion  Muscle Mass (Malnutrition): severe depletion      History of seizure  on Keppra       Thrombocytopenia  with cirrhosis   Patient with thrombocytopenia   Recent Labs   Lab 07/26/23  0519 07/27/23  0602 07/28/23  0611   PLT 51* 62* 57*   . Platelet counts stable  .monitor      Cirrhosis, Laechadec's  alcoholic cirrhosis  MELD 3.0: 19 at 7/25/2023  2:48 AM  MELD-Na: 15 at 7/25/2023  2:48 AM  Calculated from:  Serum Creatinine: 0.3 mg/dL (Using min of 1 mg/dL) at 7/25/2023  2:48 AM  Serum Sodium: 143 mmol/L (Using max of 137 mmol/L) at 7/25/2023  2:48 AM  Total Bilirubin: 3.1 mg/dL at 7/25/2023  2:48 AM  Serum Albumin: 1.8 g/dL at 7/25/2023  2:48 AM  INR(ratio): 1.5 at 7/23/2023  4:34 AM  Age at listing (hypothetical): 58 years  Sex: Female at 7/25/2023  2:48 AM     No results for input(s): AMMONIA in the last 168 hours.  Strict input /output monitor, daily weights        VTE Risk Mitigation (From admission, onward)           Ordered     Place sequential compression device  Until discontinued         07/14/23 1024     Reason for No Pharmacological VTE Prophylaxis  Once        Question:  Reasons:  Answer:  Active Bleeding    07/05/23 1334     IP VTE HIGH RISK PATIENT  Once         07/05/23 1334                    Discharge Planning   BRAYAN: 7/31/2023     Code Status: DNR   Is the patient medically ready for discharge?: No    Reason for patient still in hospital (select all that apply): Treatment  Discharge Plan A: Skilled Nursing Facility   Discharge Delays: None known at this time              Seth Noyola,  MD  Department of Hospital Medicine   [facility] Telemetry Stepdown

## 2023-07-28 NOTE — PLAN OF CARE
Problem: SLP  Goal: SLP Goal  Description: Speech Pathology Goals  To be met by 8/11/23  1. Pt will exhibit a functional swallow for tolerance of a mechanical soft diet with thin liquids in order to maintain adequate hydration and nutrition  2. Pt will participate in ongoing diagnostic dysphagia therapy        Speech Pathology Goals  To be met by 7/27/23  1. Pt will participate in ongoing diagnostic dysphagia therapy    Outcome: Ongoing, Progressing     POC extended, goals added

## 2023-07-29 LAB
ALBUMIN SERPL BCP-MCNC: 2 G/DL (ref 3.5–5.2)
ALP SERPL-CCNC: 102 U/L (ref 55–135)
ALT SERPL W/O P-5'-P-CCNC: 13 U/L (ref 10–44)
AMMONIA PLAS-SCNC: 70 UMOL/L (ref 10–50)
ANION GAP SERPL CALC-SCNC: 6 MMOL/L (ref 8–16)
APTT PPP: 34.7 SEC (ref 21–32)
AST SERPL-CCNC: 31 U/L (ref 10–40)
BASOPHILS # BLD AUTO: 0.01 K/UL (ref 0–0.2)
BASOPHILS NFR BLD: 0.4 % (ref 0–1.9)
BILIRUB SERPL-MCNC: 3.5 MG/DL (ref 0.1–1)
BUN SERPL-MCNC: 4 MG/DL (ref 6–20)
CA-I BLDV-SCNC: 1.16 MMOL/L (ref 1.06–1.42)
CA-I BLDV-SCNC: 1.21 MMOL/L (ref 1.06–1.42)
CALCIUM SERPL-MCNC: 7.9 MG/DL (ref 8.7–10.5)
CHLORIDE SERPL-SCNC: 117 MMOL/L (ref 95–110)
CO2 SERPL-SCNC: 20 MMOL/L (ref 23–29)
CREAT SERPL-MCNC: 0.4 MG/DL (ref 0.5–1.4)
DIFFERENTIAL METHOD: ABNORMAL
EOSINOPHIL # BLD AUTO: 0.1 K/UL (ref 0–0.5)
EOSINOPHIL NFR BLD: 2.6 % (ref 0–8)
ERYTHROCYTE [DISTWIDTH] IN BLOOD BY AUTOMATED COUNT: ABNORMAL % (ref 11.5–14.5)
EST. GFR  (NO RACE VARIABLE): >60 ML/MIN/1.73 M^2
GLUCOSE SERPL-MCNC: 94 MG/DL (ref 70–110)
HCT VFR BLD AUTO: 26.5 % (ref 37–48.5)
HGB BLD-MCNC: 8.4 G/DL (ref 12–16)
IMM GRANULOCYTES # BLD AUTO: 0 K/UL (ref 0–0.04)
IMM GRANULOCYTES NFR BLD AUTO: 0 % (ref 0–0.5)
LYMPHOCYTES # BLD AUTO: 0.7 K/UL (ref 1–4.8)
LYMPHOCYTES NFR BLD: 24.2 % (ref 18–48)
MAGNESIUM SERPL-MCNC: 1.6 MG/DL (ref 1.6–2.6)
MCH RBC QN AUTO: 32.8 PG (ref 27–31)
MCHC RBC AUTO-ENTMCNC: 31.7 G/DL (ref 32–36)
MCV RBC AUTO: 104 FL (ref 82–98)
MONOCYTES # BLD AUTO: 0.2 K/UL (ref 0.3–1)
MONOCYTES NFR BLD: 8.9 % (ref 4–15)
NEUTROPHILS # BLD AUTO: 1.7 K/UL (ref 1.8–7.7)
NEUTROPHILS NFR BLD: 63.9 % (ref 38–73)
NRBC BLD-RTO: 0 /100 WBC
PLATELET # BLD AUTO: 61 K/UL (ref 150–450)
PMV BLD AUTO: 10.2 FL (ref 9.2–12.9)
POCT GLUCOSE: 111 MG/DL (ref 70–110)
POCT GLUCOSE: 120 MG/DL (ref 70–110)
POCT GLUCOSE: 142 MG/DL (ref 70–110)
POCT GLUCOSE: 198 MG/DL (ref 70–110)
POCT GLUCOSE: 81 MG/DL (ref 70–110)
POCT GLUCOSE: 86 MG/DL (ref 70–110)
POTASSIUM SERPL-SCNC: 3.8 MMOL/L (ref 3.5–5.1)
PROT SERPL-MCNC: 4.9 G/DL (ref 6–8.4)
RBC # BLD AUTO: 2.56 M/UL (ref 4–5.4)
SODIUM SERPL-SCNC: 143 MMOL/L (ref 136–145)
WBC # BLD AUTO: 2.69 K/UL (ref 3.9–12.7)

## 2023-07-29 PROCEDURE — 94640 AIRWAY INHALATION TREATMENT: CPT

## 2023-07-29 PROCEDURE — 25000003 PHARM REV CODE 250: Performed by: HOSPITALIST

## 2023-07-29 PROCEDURE — 99233 SBSQ HOSP IP/OBS HIGH 50: CPT | Mod: ,,, | Performed by: HOSPITALIST

## 2023-07-29 PROCEDURE — 82140 ASSAY OF AMMONIA: CPT | Performed by: HOSPITALIST

## 2023-07-29 PROCEDURE — 99900035 HC TECH TIME PER 15 MIN (STAT)

## 2023-07-29 PROCEDURE — 85025 COMPLETE CBC W/AUTO DIFF WBC: CPT

## 2023-07-29 PROCEDURE — 20600001 HC STEP DOWN PRIVATE ROOM

## 2023-07-29 PROCEDURE — 82330 ASSAY OF CALCIUM: CPT

## 2023-07-29 PROCEDURE — 36415 COLL VENOUS BLD VENIPUNCTURE: CPT

## 2023-07-29 PROCEDURE — 83735 ASSAY OF MAGNESIUM: CPT

## 2023-07-29 PROCEDURE — 99233 PR SUBSEQUENT HOSPITAL CARE,LEVL III: ICD-10-PCS | Mod: ,,, | Performed by: HOSPITALIST

## 2023-07-29 PROCEDURE — 80053 COMPREHEN METABOLIC PANEL: CPT

## 2023-07-29 PROCEDURE — 25000242 PHARM REV CODE 250 ALT 637 W/ HCPCS: Performed by: HOSPITALIST

## 2023-07-29 PROCEDURE — 27000221 HC OXYGEN, UP TO 24 HOURS

## 2023-07-29 PROCEDURE — 85730 THROMBOPLASTIN TIME PARTIAL: CPT

## 2023-07-29 PROCEDURE — 63600175 PHARM REV CODE 636 W HCPCS: Performed by: HOSPITALIST

## 2023-07-29 PROCEDURE — 99232 SBSQ HOSP IP/OBS MODERATE 35: CPT | Mod: GC,,, | Performed by: STUDENT IN AN ORGANIZED HEALTH CARE EDUCATION/TRAINING PROGRAM

## 2023-07-29 PROCEDURE — 94761 N-INVAS EAR/PLS OXIMETRY MLT: CPT

## 2023-07-29 PROCEDURE — 99232 PR SUBSEQUENT HOSPITAL CARE,LEVL II: ICD-10-PCS | Mod: GC,,, | Performed by: STUDENT IN AN ORGANIZED HEALTH CARE EDUCATION/TRAINING PROGRAM

## 2023-07-29 RX ORDER — LACTULOSE 10 G/15ML
30 SOLUTION ORAL 3 TIMES DAILY
Status: DISCONTINUED | OUTPATIENT
Start: 2023-07-29 | End: 2023-08-04 | Stop reason: HOSPADM

## 2023-07-29 RX ADMIN — OLANZAPINE 5 MG: 5 TABLET, FILM COATED ORAL at 08:07

## 2023-07-29 RX ADMIN — CEFTRIAXONE 1 G: 1 INJECTION, POWDER, FOR SOLUTION INTRAMUSCULAR; INTRAVENOUS at 09:07

## 2023-07-29 RX ADMIN — RIFAXIMIN 550 MG: 550 TABLET ORAL at 08:07

## 2023-07-29 RX ADMIN — LEVETIRACETAM 1000 MG: 500 SOLUTION ORAL at 08:07

## 2023-07-29 RX ADMIN — LACTULOSE 30 G: 20 SOLUTION ORAL at 04:07

## 2023-07-29 RX ADMIN — IPRATROPIUM BROMIDE AND ALBUTEROL SULFATE 3 ML: 2.5; .5 SOLUTION RESPIRATORY (INHALATION) at 02:07

## 2023-07-29 RX ADMIN — LACTULOSE 10 G: 20 SOLUTION ORAL at 09:07

## 2023-07-29 RX ADMIN — LEVETIRACETAM 1000 MG: 500 SOLUTION ORAL at 09:07

## 2023-07-29 RX ADMIN — PANTOPRAZOLE SODIUM 40 MG: 40 GRANULE, DELAYED RELEASE ORAL at 09:07

## 2023-07-29 RX ADMIN — PANTOPRAZOLE SODIUM 40 MG: 40 GRANULE, DELAYED RELEASE ORAL at 08:07

## 2023-07-29 RX ADMIN — LEVETIRACETAM 1000 MG: 500 SOLUTION ORAL at 12:07

## 2023-07-29 RX ADMIN — LITHIUM CARBONATE 300 MG: 300 TABLET, FILM COATED, EXTENDED RELEASE ORAL at 08:07

## 2023-07-29 RX ADMIN — IPRATROPIUM BROMIDE AND ALBUTEROL SULFATE 3 ML: 2.5; .5 SOLUTION RESPIRATORY (INHALATION) at 09:07

## 2023-07-29 RX ADMIN — IPRATROPIUM BROMIDE AND ALBUTEROL SULFATE 3 ML: 2.5; .5 SOLUTION RESPIRATORY (INHALATION) at 08:07

## 2023-07-29 RX ADMIN — LACTULOSE 30 G: 20 SOLUTION ORAL at 08:07

## 2023-07-29 NOTE — PROGRESS NOTES
Jimmy Phillip - Telemetry [facility] Medicine  Progress Note    Patient Name: Marely Hamilton  MRN: [id number]  Patient Class: IP- Inpatient   Admission Date: 7/5/2023  Length of Stay: 24 days  Attending Physician: Seth Noyola MD  Primary Care Provider: Viktor Ross MD        Subjective:     Principal Problem:Gastrointestinal hemorrhage associated with duodenal ulcer        HPI:  Marely Hamilton is a 58 y.o. female with history of alcoholic cirrhosis, seizure disorder, DVT and IJ thrombus with recent admission for large retroperitoneal hemorrhage requiring IR embolization and IVC filter placement who presents for hematochezia. Onset this morning at [facility], alida blood per rectum per chart, sent to ED. Initially normotensive but then severe hypotension prompted initiation of levophed and intubation. Hgb 6.8, 2u pRBC given + 1u FFP ordered. Hgb 8.5 on 7/2. On levo and vaso currently. K 6.6 but renal function at baseline. Repeat pending. No prior EGDs on record.          Overview/Hospital Course:    Patient was seen at bedside, hematochezia still present post op by IR. Patient has required many transfusions of pRBCs and platelets due to ongoing down trending of Hgb. No clear source of ongoing hemorrhage by imaging. Patient has continued to require pressors to maintain MAP >65. Discussed with GI and IR regarding active bleeding post op, IR indicated there is not much else to do from their end bc they embolized a significant part of GDA. Pt is off of pressor requirement and not actively bleeding. GI re evaluated pt via EGD and found no sources of active bleeding. Pt is extubated and currently on BIPAP requirement due to de saturation in the 80's. Pt is to be seen by speech and PT/OT. Clear mucinous secretions via suction, chest physiotherapy, and cough assist device. Nebs to help with breathing as well. Pt is off BIPAP and currently on comfort flow. Triple therapy (amoxicillin, clarithromycin) started for 14 days  to treat for positive H pylori serology. Pt also had a NG tube placed so we may begin tube feedings. CxR, EKG, and ABG displayed no reason for tachycardia. Pt becomes anxious when seen by different provider teams. Remove art line and consult for midline placement. Continue to wean comfort flow as tolerated. Ensure pt is working with PT/OT/Speech for continuous improvement. Consult psych regarding patient history of bipolar disorder.         Interval History/Significant Events: Wean comfort flow as tolerated. Ensure patient is working with PT/OT/Speech for continuous improvement. Consult psych regarding psych history     7/16   history of alcoholic cirrhosis, seizure disorder, DVT and IJ thrombus with recent admission for large retroperitoneal hemorrhage requiring IR embolization and IVC filter placement who presents for hematochezia. s/p GI and IR in which they clipped (GI) and embolized (IR) possible sources of duodenal ulcer bleeding.  Hb stable  s/p extubation. sats 92% on 5L of NC.  CX ray - Detrimental changes since the previous examination including interval increase in pulmonary vascularity and bilateral diffuse interstitial pulmonary parenchymal opacification, progression of abnormal opacification at the left lung base, and development of small right-sided pleural effusion.   findings would be consistent with congestive heart failure.PT/OT recs SNF. BNP , procalcitonin and Echo.  SLP recs NPO .  On NGT feeds. psychiatry following for bipolar disorders.  Recs Zyprexa 5 mg QHS . Ammonia 48 WNL. AAOX 1. Hepatology consulted for cirrhosis. UA ordered   7/17 Saldaña removed. UA +. UC pending. started on IV ceftriaxone. ammonia at 59. on B/L UE mittens as pulled of NGT. Hepatology eval. resumed lactulose . discussed with sister and updated clinical status   7/18  MBS today - started puree nectar thick liquids.  on oxygen 3L via NC.   wean  as tolerated . UC -YEAST 10,000 - 49,999 cfu/ml Identification pending No  other significant isolate. hydroxyzine  PRN discontinued  due to it not being safe in delirium. oriented to person, hospital and year   7/19 SLP recs - tolerating Puree Diet - IDDSI Level 4, Mildly thick/Nectar thick liquids - IDDSI Level 2 .stool output 600ml/ monitor on lactulose . improved mentation AAOX 3  7/26 Await NH placement. Little improvement in functional status. Tunneled fistula seen near rectum. Low grade temp.  7/26 K replaced. Await NH placement. Little improvement in functional status. Tunneled fistula seen near rectum. Low grade temp. Consulted psych for recs concerning discharge.   7/27 Sister wanted her started back on lithium but psychiatry recommended  increasing Zyprexa 7.5mg QHS. EKG to monitor QtcDiscontinue zyprexa if Qtc >480. EKG QTC 493ms.  Zyprexa resumed at  zyprexa 5 nightly per psychiatry . Urinary retention this AM s/p straight cath   7/28 continues with confusion. UA + . UC pending. continue ceftriaxone. no fistula found on scaral exam with wound care.  Repeat EKG today to monitor Qtc. Saldaña catheter placed for urinary retention .  started  lithium 300 mg  nightly due to concern for manic episode  7/29 SLP recs - Mechanical soft, Thin . UC - GRAM NEGATIVE BILLY 10,000 - 49,999 cfu/ml  Identification and susceptibility pending  .ammonia elevated at 70. lactulose increased to 30g TID .started rifaximin BID           Review of Systems:   Pain scale:   Constitutional:  fever,  chills, headache, vision loss, hearing loss, weight loss, Generalized weakness, falls, loss of smell, loss of taste, poor appetite,  sore throat  Respiratory: cough, shortness of breath.   Cardiovascular: chest pain, dizziness, palpitations, orthopnea, swelling of feet, syncope  Gastrointestinal: nausea, vomiting, abdominal pain, diarrhea, black stool,  blood in stool, change in bowel habits  Genitourinary: hematuria, dysuria, urgency, frequency  Integument/Breast: rash,  pruritis  Hematologic/Lymphatic: easy  bruising, lymphadenopathy  Musculoskeletal: arthralgias , myalgias, back pain, neck pain, knee pain  Neurological: confusion, seizures, tremors, slurred speech  Behavioral/Psych:  depression, anxiety, auditory or visual hallucinations     OBJECTIVE:     Physical Exam:  Body mass index is 24.69 kg/m².    Constitutional: Appears well-developed and well-nourished.   Head: Normocephalic and atraumatic. + icterus  Neck: Normal range of motion. Neck supple.   Cardiovascular: Normal heart rate.  Regular heart rhythm.  Pulmonary/Chest: Effort normal.   Abdominal: No distension.  No tenderness.  baugh catheter   Musculoskeletal: Normal range of motion.   Neurological: Alert and  oriented to person, hospital follows simple commands   Skin: Skin is warm and dry. ehcymoses on the upper chest    Psychiatric: Normal mood and affect. Behavior is normal.                  Vital Signs  Temp: 98.8 °F (37.1 °C) (07/29/23 1142)  Pulse: 88 (07/29/23 1142)  Resp: 20 (07/29/23 1142)  BP: 124/62 (07/29/23 1142)  SpO2: 98 % (07/29/23 1142)     24 Hour VS Range    Temp:  [97.7 °F (36.5 °C)-99.4 °F (37.4 °C)]   Pulse:  []   Resp:  [18-20]   BP: (122-140)/(56-65)   SpO2:  [92 %-98 %]     Intake/Output Summary (Last 24 hours) at 7/29/2023 1404  Last data filed at 7/29/2023 1302  Gross per 24 hour   Intake 200 ml   Output 1125 ml   Net -925 ml         I/O This Shift:  I/O this shift:  In: 200 [I.V.:200]  Out: -     Wt Readings from Last 3 Encounters:   07/28/23 61.2 kg (135 lb)   07/03/23 44.7 kg (98 lb 8.7 oz)   06/29/23 59.9 kg (132 lb 0.9 oz)       I have personally reviewed the vitals and recorded Intake/Output     Laboratory/Diagnostic Data:    CBC/Anemia Labs: Coags:    Recent Labs   Lab 07/27/23  0602 07/28/23  0611 07/29/23  0346   WBC 2.16* 1.89* 2.69*   HGB 8.4* 7.7* 8.4*   HCT 26.6* 25.2* 26.5*   PLT 62* 57* 61*   * 108* 104*   RDW SEE COMMENT SEE COMMENT SEE COMMENT    Recent Labs   Lab 07/23/23  0434 07/24/23  0513  "07/27/23  0602 07/28/23  0611 07/29/23  0346   INR 1.5*  --   --   --   --    APTT 33.6*   < > 35.0* 38.0* 34.7*    < > = values in this interval not displayed.        Chemistries: ABG:   Recent Labs   Lab 07/27/23  0601 07/27/23  0602 07/28/23  0611 07/28/23  0612 07/29/23  0346   NA  --  142 142  --  143   K  --  3.2* 3.9  --  3.8   CL  --  115* 118*  --  117*   CO2  --  17* 18*  --  20*   BUN  --  5* 4*  --  4*   CREATININE  --  0.3* 0.4*  --  0.4*   CALCIUM  --  7.6* 7.7*  --  7.9*   PROT  --  5.0* 4.4*  --  4.9*   BILITOT  --  4.1* 3.0*  --  3.5*   ALKPHOS  --  94 92  --  102   ALT  --  14 12  --  13   AST  --  27 27  --  31   MG 1.6  --   --  1.6 1.6    No results for input(s): "PH", "PCO2", "PO2", "HCO3", "POCSATURATED", "BE" in the last 168 hours.       POCT Glucose: HbA1c:    Recent Labs   Lab 07/28/23  1552 07/29/23  0016 07/29/23  0600 07/29/23  0755 07/29/23  1138 07/29/23  1144   POCTGLUCOSE 109 111* 86 81 198* 120*    Hemoglobin A1C   Date Value Ref Range Status   05/29/2023 <4.0 (A) 4.0 - 5.6 % Final     Comment:     ADA Screening Guidelines:  5.7-6.4%  Consistent with prediabetes  >or=6.5%  Consistent with diabetes    High levels of fetal hemoglobin interfere with the HbA1C  assay. Heterozygous hemoglobin variants (HbS, HgC, etc)do  not significantly interfere with this assay.   However, presence of multiple variants may affect accuracy.  Falsely low HA1c results may be observed in patients   with clinical conditions that shorten erythrocyte   life span or decrease mean erythrocyte age.  HA1c   may not accurately reflect glycemic control when   clinical conditions that affect erythrocyte survival   are present. Fructosamine may be used as an alternate  measurement of glycemic control.     01/22/2021 4.1 4.0 - 5.6 % Final     Comment:     ADA Screening Guidelines:  5.7-6.4%  Consistent with prediabetes  >or=6.5%  Consistent with diabetes  High levels of fetal hemoglobin interfere with the " "HbA1C  assay. Heterozygous hemoglobin variants (HbS, HgC, etc)do  not significantly interfere with this assay.   However, presence of multiple variants may affect accuracy.     12/10/2020 4.6 4.0 - 5.6 % Final     Comment:     ADA Screening Guidelines:  5.7-6.4%  Consistent with prediabetes  >or=6.5%  Consistent with diabetes  High levels of fetal hemoglobin interfere with the HbA1C  assay. Heterozygous hemoglobin variants (HbS, HgC, etc)do  not significantly interfere with this assay.   However, presence of multiple variants may affect accuracy.          Cardiac Enzymes: Ejection Fractions:    No results for input(s): "CPK", "CPKMB", "MB", "TROPONINI" in the last 72 hours. EF   Date Value Ref Range Status   07/16/2023 65 % Final   06/03/2023 70 % Final          Recent Labs   Lab 07/28/23  0541   COLORU Yellow   APPEARANCEUA Hazy*   PHUR 6.0   SPECGRAV 1.015   PROTEINUA Negative   GLUCUA Negative   KETONESU Trace*   BILIRUBINUA Negative   OCCULTUA Negative   NITRITE Positive*   LEUKOCYTESUR 3+*   RBCUA 2   WBCUA >100*   BACTERIA Occasional         Procalcitonin (ng/mL)   Date Value   06/16/2023 0.26 (H)     Lactate (Lactic Acid) (mmol/L)   Date Value   07/16/2023 1.4   07/16/2023 1.4   07/06/2023 1.5   07/05/2023 8.0 (HH)   06/11/2023 6.2 (HH)     BNP (pg/mL)   Date Value   07/16/2023 174 (H)     No results found for: "CRP", "SEDRATE"  D-Dimer (mg/L FEU)   Date Value   07/07/2023 2.22 (H)     Ferritin (ng/mL)   Date Value   05/18/2023 110   10/23/2015 412 (H)   09/19/2015 599 (H)   09/19/2015 599 (H)   07/23/2015 551 (H)   06/17/2015 612 (H)     LD (U/L)   Date Value   06/05/2023 249   05/31/2023 426 (H)   09/19/2015 280 (H)     Troponin I (ng/mL)   Date Value   06/13/2023 0.029 (H)   06/13/2023 0.030 (H)   05/18/2023 0.035 (H)   05/18/2023 0.037 (H)   05/05/2023 <0.006   01/25/2023 <0.006     CPK (U/L)   Date Value   05/05/2023 47   01/25/2023 27   06/14/2020 23 (L)     CK (U/L)   Date Value   04/15/2023 98 "     Results for orders placed or performed during the hospital encounter of 05/18/23   Vitamin D   Result Value Ref Range    Vit D, 25-Hydroxy 15 (L) 30 - 96 ng/mL     SARS-CoV2 (COVID-19) Qualitative PCR (no units)   Date Value   07/26/2023 Not Detected   06/30/2023 Not Detected   02/14/2023 Not Detected     SARS-CoV-2 RNA, Amplification, Qual (no units)   Date Value   05/28/2023 Negative   11/02/2021 Negative   01/21/2021 Negative   01/11/2021 Negative   11/21/2020 Negative   06/16/2020 Negative       Microbiology labs for the last week  Microbiology Results (last 7 days)       Procedure Component Value Units Date/Time    Urine culture [[id number]]  (Abnormal) Collected: 07/28/23 0541    Order Status: Completed Specimen: Urine Updated: 07/29/23 0903     Urine Culture, Routine GRAM NEGATIVE BILLY  10,000 - 49,999 cfu/ml  Identification and susceptibility pending      Narrative:      Specimen Source->Urine            Reviewed and noted in plan where applicable- Please see chart for full lab data.    Lines/Drains:       Peripheral IV - Single Lumen 07/12/23 1148 20 G;1 3/4 in Left Forearm (Active)   Site Assessment Clean;Dry;Intact 07/16/23 0701   Extremity Assessment Distal to IV No abnormal discoloration 07/16/23 0701   Line Status No blood return;Flushed;Saline locked 07/16/23 0701   Dressing Status Dry;Clean;Intact 07/16/23 0701   Dressing Intervention Integrity maintained 07/16/23 0701   Dressing Change Due 07/16/23 07/16/23 0701   Site Change Due 07/16/23 07/16/23 0701   Reason Not Rotated Not due 07/16/23 0701   Number of days: 3            Peripheral IV - Single Lumen 07/14/23 1540 20 G;1 3/4 in Right Upper Arm (Active)   Site Assessment Intact;Clean;Dry 07/16/23 0701   Extremity Assessment Distal to IV No abnormal discoloration 07/16/23 0701   Line Status Blood return noted;Flushed;Saline locked 07/16/23 0701   Dressing Status Dry;Intact;Clean 07/16/23 0701   Dressing Intervention Integrity maintained 07/16/23  "0701   Dressing Change Due 07/18/23 07/16/23 0701   Site Change Due 07/18/23 07/16/23 0701   Reason Not Rotated Not due 07/16/23 0701   Number of days: 1            NG/OG Tube 07/13/23 1311 Tensed sump 18 Fr. Right nostril (Active)   Placement Check placement verified by aspirate characteristics 07/16/23 0701   Tolerance no signs/symptoms of discomfort 07/16/23 0701   Securement secured to nostril center w/ adhesive device 07/16/23 0701   Clamp Status/Tolerance unclamped 07/16/23 0701   Suction Setting/Drainage Method suction at the bedside 07/16/23 0701   Insertion Site Appearance no redness, warmth, tenderness, skin breakdown, drainage 07/16/23 0701   Flush/Irrigation flushed w/;water 07/16/23 0502   Feeding Type continuous;by pump 07/16/23 0701   Feeding Action feeding continued 07/16/23 0701   Current Rate (mL/hr) 50 mL/hr 07/16/23 0701   Goal Rate (mL/hr) 50 mL/hr 07/16/23 0701   Intake (mL) 20 mL 07/15/23 0800   Water Bolus (mL) 250 mL 07/16/23 0501   Rate Formula Tube Feeding (mL/hr) 20 mL/hr 07/14/23 0400   Formula Name Isosource 1.5 07/16/23 0701   Intake (mL) - Formula Tube Feeding 50 07/16/23 0700   Residual Amount (ml) 30 ml 07/15/23 2337   Number of days: 2            Urethral Catheter 07/05/23 1332 Double-lumen (Active)   Site Assessment Clean;Intact 07/16/23 0701   Collection Container Urimeter 07/16/23 0701   Securement Method secured to top of thigh w/ adhesive device 07/16/23 0701   Catheter Care Performed yes 07/16/23 0701   Reason for Continuing Urinary Catheterization Non-healing sacral/perineal wound 07/16/23 0701   CAUTI Prevention Bundle Securement Device in place with 1" slack;Intact seal between catheter & drainage tubing;Drainage bag/urimeter off the floor;Sheeting clip in use;No dependent loops or kinks;Drainage bag/urimeter not overfilled (<2/3 full);Drainage bag/urimeter below bladder 07/16/23 0701   Output (mL) 75 mL 07/16/23 0738   Number of days: 10            Fecal Incontinence "  07/09/23 2300 (Active)   $ Fecal Management Supplies Fecal Management System (Supply) 07/14/23 1954   Application fecal incontinence  in place 07/16/23 0701   Drainage Method attached to drainage bag 07/16/23 0701   Securement to gravity 07/16/23 0701   Skin cleansed, skin barrier applied 07/16/23 0701   Tolerance no signs/symptoms of discomfort 07/16/23 0701   Stool (mL) 200 mL 07/16/23 0501   Number of days: 6       Imaging  ECG Results              EKG 12-lead (Final result)  Result time 07/05/23 13:56:05      Final result by Interface, Lab In [facility] (07/05/23 13:56:05)               Narrative:    Test Reason : E87.5,    Vent. Rate : 103 BPM     Atrial Rate : 103 BPM     P-R Int : 132 ms          QRS Dur : 076 ms      QT Int : 342 ms       P-R-T Axes : 073 054 084 degrees     QTc Int : 448 ms    Age and gender specific analysis  Sinus tachycardia  Low voltage QRS  Low anterior forces and R wave progression  Possibly acute STEMI    ACUTE MI / STEMI    Abnormal ECG  When compared with ECG of 05-JUL-2023 11:07,  No significant change was found  Confirmed by Abimael CLEMENTS MD ([id number]) on 7/5/2023 1:55:55 PM    Referred By: [email or web address]   SELF           Confirmed By:Abimael CLEMENTS MD                                   EKG 12-lead (Final result)  Result time 07/05/23 14:01:06      Final result by Interface, Lab In [facility] (07/05/23 14:01:06)               Narrative:    Test Reason : K92.2,    Vent. Rate : 096 BPM     Atrial Rate : 096 BPM     P-R Int : 114 ms          QRS Dur : 066 ms      QT Int : 352 ms       P-R-T Axes : 078 090 065 degrees     QTc Int : 444 ms    Normal sinus rhythm  Vertical axis  Otherwise normal ECG  When compared with ECG of 27-JUN-2023 12:05,  T wave inversion no longer evident in Anterior leads  Confirmed by Cameron Hodge MD ([id number]) on 7/5/2023 2:01:00 PM    Referred By: System System           Confirmed By:Cameron Hodge MD                                  Results for orders placed  during the hospital encounter of 05/28/23    Echo    Interpretation Summary  · The left ventricle is normal in size with hyperdynamic systolic function. The estimated ejection fraction is 70%.  · Normal right ventricular size with normal right ventricular systolic function.  · Normal left ventricular diastolic function.  · Mild left atrial enlargement.  · Mechanically ventilated; cannot use inferior caval vein diameter to estimate central venous pressure.  · Trivial pericardial effusion.  · There is a left pleural effusion.      X-Ray Chest AP Portable  Narrative: EXAMINATION:  XR CHEST AP PORTABLE    CLINICAL HISTORY:  Fever;    TECHNIQUE:  Single frontal view of the chest was performed.    COMPARISON:  No 07/16/2023 ne    FINDINGS:  Mild cardiomegaly.  Mild edema.  Small ill-defined opacities noted adjacent to the left hilum similar to the previous study.  Opacification at the left lung base probably atelectatic changes and  smaller amount of pleural effusion.  Impression: No significant changes    Electronically signed by: Americo Graf MD  Date:    07/25/2023  Time:    12:17      Labs, Imaging, EKG and Diagnostic results from 7/29/2023 were reviewed.    Medications:  Medication list was reviewed and changes noted under Assessment/Plan.  No current facility-administered medications on file prior to encounter.     Current Outpatient Medications on File Prior to Encounter   Medication Sig Dispense Refill    ARIPiprazole (ABILIFY) 20 MG Tab Take 5 mg by mouth once daily.      folic acid (FOLVITE) 1 MG tablet Take 1 tablet (1 mg total) by mouth once daily. (Patient taking differently: Take 1 mg by mouth every evening.) 30 tablet 2    furosemide (LASIX) 20 MG tablet Take 20 mg by mouth once daily.      lactulose (CHRONULAC) 10 gram/15 mL solution Take 10 g by mouth 3 (three) times daily.      lithium carbonate 150 MG capsule Take 1 capsule (150 mg total) by mouth every evening. (Patient taking differently: Take 150 mg  by mouth 2 (two) times a day.) 30 capsule 11    magnesium oxide (MAG-OX) 400 mg (241.3 mg magnesium) tablet Take by mouth once daily.      pantoprazole (PROTONIX) 40 MG tablet Take 40 mg by mouth once daily.      propranoloL (INDERAL) 40 MG tablet Take 40 mg by mouth once daily.      QUEtiapine (SEROQUEL) 300 MG Tab Take 100 mg by mouth every evening.      sodium bicarbonate 650 MG tablet Take 650 mg by mouth Daily.      spironolactone (ALDACTONE) 50 MG tablet Take 1 tablet (50 mg total) by mouth once daily. 90 tablet 3    zonisamide (ZONEGRAN) 100 MG Cap Take 100 mg by mouth once daily.      levETIRAcetam (KEPPRA) 1000 MG tablet Take 1,000 mg by mouth 2 (two) times daily.      OLANZapine (ZYPREXA) 10 MG tablet Take 1 tablet (10 mg total) by mouth every evening. (Patient not taking: Reported on 7/6/2023) 30 tablet 11    rifAXIMin (XIFAXAN) 550 mg Tab Take 1 tablet (550 mg total) by mouth 2 (two) times daily. (Patient not taking: Reported on 7/6/2023) 180 tablet 3     Scheduled Medications:  albuterol-ipratropium, 3 mL, Nebulization, Q6H WAKE  cefTRIAXone (ROCEPHIN) IVPB, 1 g, Intravenous, Q24H  lactulose, 30 g, Oral, TID  levetiracetam, 1,000 mg, Oral, BID  lithium, 300 mg, Oral, QHS  OLANZapine, 5 mg, Oral, QHS  pantoprazole, 40 mg, Oral, BID  rifAXImin, 550 mg, Oral, BID      PRN: dextrose 10%, dextrose 10%, glucagon (human recombinant), insulin aspart U-100, oxyCODONE, sodium chloride 0.9%  Infusions:       Estimated Creatinine Clearance: 133.5 mL/min (A) (based on SCr of 0.4 mg/dL (L)).    Assessment/Plan:      * Gastrointestinal hemorrhage associated with duodenal ulcer    as  above        Urinary retention  7/27  2 episodes. s/p straight cath   7/28 Saldaña catheter placed for urinary retention         Irritant contact dermatitis due to fecal, urinary or dual incontinence  wound care eval      Dysphagia  7/18  MBS today -started puree nectar thick liquids.    7/29 SLP recs - Mechanical soft, Thin  liquids      Hypoxia   7/16 sats 92% on 5L of NC.  CX ray - Detrimental changes since the previous examination including interval increase in pulmonary vascularity and bilateral diffuse interstitial pulmonary parenchymal opacification, progression of abnormal opacification at the left lung base, and development of small right-sided pleural effusion.   findings would be consistent with congestive heart failure.PT/OT recs SNF. BNP , procalcitonin and Echo.   7/27 Sats 97% on RA    H. pylori infection    positive serology for H pylori, begin triple therapy with clarithromycin,amoxicillin, and PPI for 14 days       Hematochezia    - GI EGD clipped duodenal ulcers for IR reference  - Transfuse blood products for active bleeding   - trend CBC and follow  - IR embolized GDA for duodenal hyperemia   -- GI re evaluated site of duodenal ulcers, no noted active bleeding  -- Continue PPI BID for 8 weeks, then once daily after that  --Pt had positive serology for H pylori, had triple therapy with clarithromycin,amoxicillin, and PPI for 14 days      Acute blood loss anemia     - Maintain IV access with 2 large bore Ivs  - Intravascular resuscitation/support with IVFs   - Hold all NSAIDs and anticoagulants, unless contraindicated.  - Please correct any coagulopathy with platelets and FFP for goal of platelets >50K and INR <2.0  - Please notify GI team if there is significant change in patient's clinical status  - To date, patient has required at least 21 units of pRBCs to maintain Hgb >7     7/16     Patient's with macrocytic anemia.. Hemoglobin stable. Etiology likely due to acute blood loss.  Current CBC reviewed-    Recent Labs   Lab 07/25/23  0248 07/26/23  0519 07/27/23  0602   HGB 7.6* 8.1* 8.4*         Component Value Date/Time     (H) 07/27/2023 0602    RDW SEE COMMENT 07/27/2023 0602    IRON 132 05/18/2023 1527    FERRITIN 110 05/18/2023 1527    RETIC 3.9 (H) 06/05/2023 0935    FOLATE 11.5 07/17/2023 0619    [id number]  07/17/2023 0619    OCCULTBLOOD Negative 09/19/2015 0137     Monitor CBC and transfuse if H/H drops below 7/21.        Retroperitoneal bleed  Retroperitoneum: No significant adenopathy.  Similar sized left retroperitoneal hematoma measuring 11 x 9 cm (series 5, image 100; previously 11 x 9 cm).  The left iliacus collection also measures similar to prior at 5.4 cm (series 5, image 127; previously 5.6 cm).  Layering hyperdensity within these collections suggesting different ages in blood products.  No significant volume active extravasation into these collections  - CT-A demonstrated stable retroperitoneal hematoma. No need for intervention at this time.          Supratherapeutic INR  patient with INR   Recent Labs   Lab 07/23/23  0434   INR 1.5*   . INR is supratherapeutic. secondary to coagulopathy from liver disease.monitor      Hypokalemia  replaced       UTI (urinary tract infection)  7/17 Saldaña removed. UA +. UC pending. started on IV ceftriaxone.  7/18 UC -YEAST 10,000 - 49,999 cfu/ml Identification pending No other significant isolate  7/28 continues with confusion. UA + . UC pending. continue ceftriaxone. no fistula found on sacral exam with wound care.   7/29   UC - GRAM NEGATIVE BILLY 10,000 - 49,999 cfu/ml  Identification and susceptibility pending       Bipolar 1 disorder     - Consult psych regarding Bipolar 1 disorder history  7/27 Sister wanted her started back on lithium but psychiatry recommended  increasing Zyprexa 7.5mg QHS. EKG to monitor Qtc.Discontinue zyprexa if Qtc >480.  EKG QTC 493ms.  Zyprexa swtiched to 5mg HS  7/28  started  lithium 300 mg  nightly due to concern for manic episode       Hepatic encephalopathy   ammonia 190s on admit -->90s . AAOX 1. no lactulose give due to GI bleed  repeat ammonia. Hepatolog eval  with     MELD 3.0: 19 at 7/25/2023  2:48 AM  MELD-Na: 15 at 7/25/2023  2:48 AM  Calculated from:  Serum Creatinine: 0.3 mg/dL (Using min of 1 mg/dL) at 7/25/2023  2:48 AM  Serum  Sodium: 143 mmol/L (Using max of 137 mmol/L) at 7/25/2023  2:48 AM  Total Bilirubin: 3.1 mg/dL at 7/25/2023  2:48 AM  Serum Albumin: 1.8 g/dL at 7/25/2023  2:48 AM  INR(ratio): 1.5 at 7/23/2023  4:34 AM  Age at listing (hypothetical): 58 years  Sex: Female at 7/25/2023  2:48 AM  7/16 Ammonia 48 WNL. AAOX 1. Hepatology consulted for cirrhosis.    7/17  Hepatology eval. resumed lactulose.  7/18  hydroxyzine  PRN  discontinued  due to it not being safe in delirium  7/29 ammonia elevated at 70. lactulose increased to 30g TID.  started rifaximin BID     Severe protein-calorie malnutrition  Nutrition consulted. Most recent weight and BMI monitored-     Measurements:  Wt Readings from Last 1 Encounters:   07/27/23 61.2 kg (135 lb)   Body mass index is 24.69 kg/m².    Patient has been screened and assessed by RD.    Malnutrition Type:  Context: social/environmental circumstances  Level: severe    Malnutrition Characteristic Summary:  Energy Intake (Malnutrition): less than or equal to 75% for greater than or equal to 1 month  Subcutaneous Fat (Malnutrition): severe depletion  Muscle Mass (Malnutrition): severe depletion      History of seizure  on Keppra       Thrombocytopenia  with cirrhosis   Patient with thrombocytopenia   Recent Labs   Lab 07/27/23  0602 07/28/23  0611 07/29/23  0346   PLT 62* 57* 61*   . Platelet counts stable  .monitor      Cirrhosis, Laennec's  alcoholic cirrhosis  MELD 3.0: 19 at 7/25/2023  2:48 AM  MELD-Na: 15 at 7/25/2023  2:48 AM  Calculated from:  Serum Creatinine: 0.3 mg/dL (Using min of 1 mg/dL) at 7/25/2023  2:48 AM  Serum Sodium: 143 mmol/L (Using max of 137 mmol/L) at 7/25/2023  2:48 AM  Total Bilirubin: 3.1 mg/dL at 7/25/2023  2:48 AM  Serum Albumin: 1.8 g/dL at 7/25/2023  2:48 AM  INR(ratio): 1.5 at 7/23/2023  4:34 AM  Age at listing (hypothetical): 58 years  Sex: Female at 7/25/2023  2:48 AM     No results for input(s): AMMONIA in the last 168 hours.  Strict input /output monitor, daily  weights        VTE Risk Mitigation (From admission, onward)           Ordered     Place sequential compression device  Until discontinued         07/14/23 1024     Reason for No Pharmacological VTE Prophylaxis  Once        Question:  Reasons:  Answer:  Active Bleeding    07/05/23 1334     IP VTE HIGH RISK PATIENT  Once         07/05/23 1334                    Discharge Planning   BRAYAN: 7/31/2023     Code Status: DNR   Is the patient medically ready for discharge?: (No Documentation)    Reason for patient still in hospital (select all that apply): Treatment  Discharge Plan A: Skilled Nursing Facility   Discharge Delays: None known at this time              Seth Noyola MD  Department of Hospital Medicine   [facility] - Telemetry Stepdown

## 2023-07-29 NOTE — PLAN OF CARE
Problem: Infection  Goal: Absence of Infection Signs and Symptoms  Outcome: Unable to Meet, Plan Revised     Problem: Adult Inpatient Plan of Care  Goal: Plan of Care Review  Outcome: Unable to Meet, Plan Revised  Goal: Patient-Specific Goal (Individualized)  Outcome: Unable to Meet, Plan Revised  Goal: Absence of Hospital-Acquired Illness or Injury  Outcome: Unable to Meet, Plan Revised  Goal: Optimal Comfort and Wellbeing  Outcome: Unable to Meet, Plan Revised  Goal: Readiness for Transition of Care  Outcome: Unable to Meet, Plan Revised     Problem: Fluid and Electrolyte Imbalance (Acute Kidney Injury/Impairment)  Goal: Fluid and Electrolyte Balance  Outcome: Unable to Meet, Plan Revised     Problem: Oral Intake Inadequate (Acute Kidney Injury/Impairment)  Goal: Optimal Nutrition Intake  Outcome: Unable to Meet, Plan Revised     Problem: Renal Function Impairment (Acute Kidney Injury/Impairment)  Goal: Effective Renal Function  Outcome: Unable to Meet, Plan Revised     Problem: Fall Injury Risk  Goal: Absence of Fall and Fall-Related Injury  Outcome: Unable to Meet, Plan Revised    Problem: Device-Related Complication Risk (Artificial Airway)  Goal: Optimal Device Function  Outcome: Unable to Meet, Plan Revised     Problem: Skin and Tissue Injury (Artificial Airway)  Goal: Absence of Device-Related Skin or Tissue Injury  Outcome: Unable to Meet, Plan Revised     Problem: Skin Injury Risk Increased  Goal: Skin Health and Integrity  Outcome: Unable to Meet, Plan Revised    Patient remained free of falls/injury/trauma throughout shift. Patient AAOx1. Neuro status unchanged. VSS. No complaints of chest pain or SOB. Patient intermittently on 2 L NC when the patient wears it. Patient continues to void per the baugh in place with UOP of 1125 mLs. Patient repositioned independently. All sites and wounds dressed and covered with barrier cream.  POC reviewed with patient. Patient verbalized understanding. Care remains  ongoing.

## 2023-07-29 NOTE — TREATMENT PLAN
Hepatology Treatment Plan    Marely Hamilton is a 58 y.o. female admitted to hospital 7/5/2023 (Hospital Day: 25) due to Gastrointestinal hemorrhage associated with duodenal ulcer.     Interval History  Pending discharge to SNF. Treating UTI.     Objective  Temp:  [97.7 °F (36.5 °C)-99.4 °F (37.4 °C)] 97.7 °F (36.5 °C) (07/29 0800)  Pulse:  [] 89 (07/29 0806)  BP: (122-140)/(56-70) 129/60 (07/29 0800)  Resp:  [18-20] 18 (07/29 0806)  SpO2:  [92 %-98 %] 98 % (07/29 0806)    Laboratory    Lab Results   Component Value Date    WBC 2.69 (L) 07/29/2023    HGB 8.4 (L) 07/29/2023    HCT 26.5 (L) 07/29/2023     (H) 07/29/2023    PLT 61 (L) 07/29/2023       Lab Results   Component Value Date     07/29/2023    K 3.8 07/29/2023     (H) 07/29/2023    CO2 20 (L) 07/29/2023    BUN 4 (L) 07/29/2023    CREATININE 0.4 (L) 07/29/2023    CALCIUM 7.9 (L) 07/29/2023       Lab Results   Component Value Date    ALBUMIN 2.0 (L) 07/29/2023    ALT 13 07/29/2023    AST 31 07/29/2023    GGT 33 06/17/2023    ALKPHOS 102 07/29/2023    BILITOT 3.5 (H) 07/29/2023       Lab Results   Component Value Date    INR 1.5 (H) 07/23/2023    INR 1.4 (H) 07/21/2023    INR 1.5 (H) 07/20/2023       MELD 3.0: 19 at 7/25/2023  2:48 AM  MELD-Na: 15 at 7/25/2023  2:48 AM  Calculated from:  Serum Creatinine: 0.3 mg/dL (Using min of 1 mg/dL) at 7/25/2023  2:48 AM  Serum Sodium: 143 mmol/L (Using max of 137 mmol/L) at 7/25/2023  2:48 AM  Total Bilirubin: 3.1 mg/dL at 7/25/2023  2:48 AM  Serum Albumin: 1.8 g/dL at 7/25/2023  2:48 AM  INR(ratio): 1.5 at 7/23/2023  4:34 AM  Age at listing (hypothetical): 58 years  Sex: Female at 7/25/2023  2:48 AM      Assessment    Marely Hamilton is a 58 y.o. female with EtOH cirrhosis, seizure disorder on AED's, Bipolar Disorder, DVT and IJ thrombus with recent admission for retroperitoneal hemorrhage requiring IR embolization and IVC filter placement who presents to [facility] for hematochezia. Hepatology  consulted given the patient has cirrhosis with AMS.      Problem List:  EtOH cirrhosis   Acute Metabolic Encephalopathy  Bipolar Disorder  Delirium  UTI- GNR   ?Manic Episode      Recommendations:     - Would continue home lactulose, if there are issues with encephalopathy, can add add rifaximin   - Treat UTI; f/u urine culture- currently GNR's   - PETH level <10  - Delirium precautions   - MELD 19, will consider outpatient transplant evaluation   - Psych evaluating for possible manic episodes; Lithium restarted qhs   - Pending discharge to SNF   - Please place ambulatory referral to hepatology clinic at discharge     Thank you for involving us in the care of Marely Hamilton. Please call with any additional concerns or questions. We will sign off at this time.     Amari Santo DO   Gastroenterology Fellow PGY- IV  [facility]

## 2023-07-29 NOTE — PROGRESS NOTES
"CONSULTATION LIAISON PSYCHIATRY PROGRESS NOTE    Patient Name: Marely Hamilton  MRN: [id number]  Patient Class: IP- Inpatient  Admission Date: 7/5/2023  Attending Physician: Seth Noyola MD      SUBJECTIVE:   Marely Hamilton is a 58 y.o. female with past psychiatric history of bipolar disorder, alcohol use disorder & past pertinent medical history of seizure disorder, alcohol induced hepatic cirrhosis presents to the ED/admitted to the hospital for Gastrointestinal hemorrhage associated with duodenal ulcer. Patient presented from SNF (when she experienced alida blood per rectum per chart review), for which she was recently discharged to when she presented to the hospital on for hepatic encephalopathy c/b retroperitoneal bleed (s/p ICV filter and IR embolization).     Psychiatry consulted for medication management      Resident Evaluation:  Today, pt was awake, lying in bed watching TV. Watching TV while being assisted with feeds via nursing staff. Stating her mood is "great" this AM. Continued tangential and loose associations this AM, quoting the TV and intermittently describing what the TV was showing at the same time. Answering questions with answers related to what was on TV (Animal channel documentary). She was oriented to self only this AM. States slept okay yesterday, without any issues. Reports seeing visual hallucinations today, and pointed at interviewer, informed her that interviewer was not a hallucinations, but actually here and verified with nurse at bedside.     Interval Events: Patient did not require behavioral PRNs overnight.    OBJECTIVE:    Mental Status Exam:  General Appearance: dressed in hospital garb, lying in bed, appears older than stated age, thin and gauntfriendly, polite  Behavior:   Involuntary Movements and Motor Activity: +tremors  Gait and Station: unable to assess - patient lying down or seated  Speech and Language: fluent English, soft, monotone, talkative  Mood: " ""great"  Affect: calm  Thought Process and Associations: bizarre, tangential, scattered, +loosening of associations  Thought Content and Perceptions:: + visual hallucinations, + delusions  Sensorium and Orientation: alert, oriented to person only  Recent and Remote Memory: impaired  Attention and Concentration: easily distractible  Fund of Knowledge: impaired  Insight: limited/partial awareness of illness  Judgment: poor      ASSESSMENT & RECOMMENDATIONS   Hx of Bipolar Disorder  R/o delirium, likely due to UTI in setting of +UA, on antibiotics now     PSYCH MEDICATIONS  Scheduled: Recommend continue lithium 300 mg nightly and Continue zyprexa 5 mg nightly  Does not appear to be requiring PRNs at this time, in future if patient does become agitated recommend Zyprexa 5 mg PO/IM.   Monitor QTc if patient requires PRN zyprexa.      DELIRIUM PRECAUTION BEHAVIOR MANAGEMENT  PLEASE utilize CHEMICAL restraints with PRN meds first for agitation. Minimize use of PHYSICAL restraints OR have periods of being out of physical restraints if possible.  Keep window shades open and room lit during day and room dim at night in order to promote normal sleep-wake cycles  Encourage family at bedside. [place] patient often to situation, location, date.  Continue to Limit or Discontinue use of Narcotics, Benzos and Anti-cholinergic medications as they may worsen delirium.  Continue medical workup for causative etiology of Delirium.      RISK ASSESSMENT  NO NEED FOR PEC patient NOT in any imminent danger of hurting self or others and not gravely disabled.      FOLLOW UP  Will follow up while in house     DISPOSITION - once medically cleared:   Defer to medical team     Please contact ON CALL psychiatry service (24/7) for any acute issues that may arise.        Dr. Rodrigue Son   Psychiatry  [facility]  7/29/2023 10:27 " AM        --------------------------------------------------------------------------------------------------------------------------------------------------------------------------------------------------------------------------------------    CONTINUED OBJECTIVE clinical data & findings reviewed and noted for above decision making    Current Medications:   Scheduled Meds:    albuterol-ipratropium  3 mL Nebulization Q6H WAKE    cefTRIAXone (ROCEPHIN) IVPB  1 g Intravenous Q24H    lactulose  30 g Oral TID    levetiracetam  1,000 mg Oral BID    lithium  300 mg Oral QHS    OLANZapine  5 mg Oral QHS    pantoprazole  40 mg Oral BID    rifAXImin  550 mg Oral BID     PRN Meds: dextrose 10%, dextrose 10%, glucagon (human recombinant), insulin aspart U-100, oxyCODONE, sodium chloride 0.9%    Allergies:   Review of patient's allergies indicates:   Allergen Reactions    Sulfa (sulfonamide antibiotics) Rash    Codeine Nausea And Vomiting       Vitals  Vitals:    07/29/23 1142   BP: 124/62   Pulse: 88   Resp: 20   Temp: 98.8 °F (37.1 °C)       Labs/Imaging/Studies:  Recent Results (from the past 24 hour(s))   POCT glucose    Collection Time: 07/28/23  3:52 PM   Result Value Ref Range    POCT Glucose 109 70 - 110 mg/dL   Calcium, ionized    Collection Time: 07/28/23  4:01 PM   Result Value Ref Range    Ionized Calcium 1.20 1.06 - 1.42 mmol/L   POCT glucose    Collection Time: 07/29/23 12:16 AM   Result Value Ref Range    POCT Glucose 111 (H) 70 - 110 mg/dL   APTT    Collection Time: 07/29/23  3:46 AM   Result Value Ref Range    aPTT 34.7 (H) 21.0 - 32.0 sec   Magnesium    Collection Time: 07/29/23  3:46 AM   Result Value Ref Range    Magnesium 1.6 1.6 - 2.6 mg/dL   Calcium, ionized    Collection Time: 07/29/23  3:46 AM   Result Value Ref Range    Ionized Calcium 1.16 1.06 - 1.42 mmol/L   CBC Auto Differential    Collection Time: 07/29/23  3:46 AM   Result Value Ref Range    WBC 2.69 (L) 3.90 - 12.70 K/uL    RBC 2.56 (L) 4.00 -  5.40 M/uL    Hemoglobin 8.4 (L) 12.0 - 16.0 g/dL    Hematocrit 26.5 (L) 37.0 - 48.5 %     (H) 82 - 98 fL    MCH 32.8 (H) 27.0 - 31.0 pg    MCHC 31.7 (L) 32.0 - 36.0 g/dL    RDW SEE COMMENT 11.5 - 14.5 %    Platelets 61 (L) 150 - 450 K/uL    MPV 10.2 9.2 - 12.9 fL    Immature Granulocytes 0.0 0.0 - 0.5 %    Gran # (ANC) 1.7 (L) 1.8 - 7.7 K/uL    Immature Grans (Abs) 0.00 0.00 - 0.04 K/uL    Lymph # 0.7 (L) 1.0 - 4.8 K/uL    Mono # 0.2 (L) 0.3 - 1.0 K/uL    Eos # 0.1 0.0 - 0.5 K/uL    Baso # 0.01 0.00 - 0.20 K/uL    nRBC 0 0 /100 WBC    Gran % 63.9 38.0 - 73.0 %    Lymph % 24.2 18.0 - 48.0 %    Mono % 8.9 4.0 - 15.0 %    Eosinophil % 2.6 0.0 - 8.0 %    Basophil % 0.4 0.0 - 1.9 %    Differential Method Automated    Comprehensive Metabolic Panel    Collection Time: 07/29/23  3:46 AM   Result Value Ref Range    Sodium 143 136 - 145 mmol/L    Potassium 3.8 3.5 - 5.1 mmol/L    Chloride 117 (H) 95 - 110 mmol/L    CO2 20 (L) 23 - 29 mmol/L    Glucose 94 70 - 110 mg/dL    BUN 4 (L) 6 - 20 mg/dL    Creatinine 0.4 (L) 0.5 - 1.4 mg/dL    Calcium 7.9 (L) 8.7 - 10.5 mg/dL    Total Protein 4.9 (L) 6.0 - 8.4 g/dL    Albumin 2.0 (L) 3.5 - 5.2 g/dL    Total Bilirubin 3.5 (H) 0.1 - 1.0 mg/dL    Alkaline Phosphatase 102 55 - 135 U/L    AST 31 10 - 40 U/L    ALT 13 10 - 44 U/L    eGFR >60.0 >60 mL/min/1.73 m^2    Anion Gap 6 (L) 8 - 16 mmol/L   Ammonia    Collection Time: 07/29/23  3:46 AM   Result Value Ref Range    Ammonia 70 (H) 10 - 50 umol/L   POCT glucose    Collection Time: 07/29/23  6:00 AM   Result Value Ref Range    POCT Glucose 86 70 - 110 mg/dL   POCT glucose    Collection Time: 07/29/23  7:55 AM   Result Value Ref Range    POCT Glucose 81 70 - 110 mg/dL   POCT glucose    Collection Time: 07/29/23 11:38 AM   Result Value Ref Range    POCT Glucose 198 (H) 70 - 110 mg/dL   POCT glucose    Collection Time: 07/29/23 11:44 AM   Result Value Ref Range    POCT Glucose 120 (H) 70 - 110 mg/dL     Imaging Results               CTA Acute GI [place], Abdomen and Pelvis (Final result)  Result time 07/05/23 17:15:19      Final result by Mumtaz Garsia MD (07/05/23 17:15:19)                   Impression:      Images are degraded by significant streak and motion artifacts.    Stable large left retroperitoneal hematoma and left iliacus hematoma.  No contrast extravasation within the hematomas or bowel to indicate active arterial bleed.    Hepatic cirrhosis.  Diffuse wall thickening of the stomach and colon, which can be seen in the setting of hepatic dysfunction.  However, superimposed inflammatory processes are not excluded.    Multiple, nondilated, distended fluid-filled loops of small bowel without a definite transition point.  Stool and air seen within the colon, this is suggestive of ileus.    Small left pleural effusion with associated compressive atelectasis.    Cholelithiasis.    Cardiomegaly.    Electronically signed by resident: Emiliano Mcmahon  Date:    07/05/2023  Time:    16:29    Electronically signed by: Mumtaz Garsia MD  Date:    07/05/2023  Time:    17:15               Narrative:    EXAMINATION:  CTA ACUTE GI BLEED, ABDOMEN AND PELVIS    CLINICAL HISTORY:  GI bleed;    TECHNIQUE:  Initial noncontrast  images were obtained of the abdomen and pelvis.  CT axial angiography images were then obtained from the lung bases to the pubic symphysis following the intravenous administration of 100 of Omnipaque 350 with delayed images obtained per GI bleeding protocol.  Sagittal and coronal reformats were provided.    COMPARISON:  CTA GI bleed 06/13/2023    FINDINGS:  Images are degraded by significant streak and motion artifacts.    Lungs: Interval decrease in size of the small right pleural effusion with associated compressive atelectasis.  Resolution of the left pleural effusion.    Heart: Enlarged.  No pericardial effusion.    Liver: Nodular contour of the liver.  No focal abnormality.    Gallbladder: Multiple calcified gallstones.   No pericholecystic inflammatory changes.    Bile ducts: No intrahepatic or extrahepatic biliary ductal dilatation.    Spleen: Normal size.    Pancreas: No mass. No ductal dilatation. No peripancreatic fat stranding.    Adrenals: No significant abnormality    Renal/Ureters: Bilateral cortical thinning.  No hydronephrosis.  Proximal ureters are normal caliber.  The distal ureters are difficult to evaluate due to streak artifact.  Bladder is decompressed with Saldaña catheter in place.    Reproductive: Uterus appears without significant abnormality.  No adnexal mass, noting limited evaluation due to significant streak artifact.    Stomach/Bowel: Stomach is distended with ingested contents with thickened rugal folds.  Small bowel is distended with multiple nondilated fluid-filled loops.  No transition point.  Appendix is normal.  Diffuse wall thickening throughout the colon with mild stool burden.  Diffuse wall thickening of the rectum.  No contrast extravasation to indicate active arterial bleed.    Peritoneum: Stable large left retroperitoneal hematoma measuring 11.5 x 7.8 x 12.6 cm.  No contrast extravasation to indicate active bleed within the hematoma.  Additional smaller hematoma anterior to the left iliacus muscle, which appears stable compared to prior CTA.  No free fluid. No intraperitoneal free air.    Lymph Nodes: Multiple prominent mesenteric and retroperitoneal lymph nodes.    Vasculature: Abdominal aorta tapers normally.  Mild atherosclerosis of the abdominal aorta and its branches.    Bones: Bony mineralization is diminished.  Left hip arthroplasty.  Generalized body wall edema.    Soft Tissues: No significant abnormality.                                       CT Head Without Contrast (Final result)  Result time 07/05/23 15:10:53      Final result by Viktor Desouza MD (07/05/23 15:10:53)                   Impression:      No evidence of acute intracranial pathology.    Volume loss again  identified.    Electronically signed by resident: Kenney Rosas  Date:    07/05/2023  Time:    14:45    Electronically signed by: Viktor Desouza  Date:    07/05/2023  Time:    15:10               Narrative:    EXAMINATION:  CT HEAD WITHOUT CONTRAST    CLINICAL HISTORY:  Mental status change, unknown cause;    TECHNIQUE:  Low dose axial CT images obtained throughout the head without the use of intravenous contrast.  Axial, sagittal and coronal reconstructions were performed.    COMPARISON:  CT head 06/22/2023    FINDINGS:  Intracranial compartment:    Volume loss without evidence of hydrocephalus.    No parenchymal  hemorrhage, edema, mass effect or major vascular distribution infarct.    Decreased attenuation in the periventricular white matter is nonspecific but may reflect mild chronic small vessel ischemic change vs other encephalopathy.    No extra-axial blood or fluid collections.    Skull/extracranial contents (limited evaluation):    No displaced calvarial fracture.    The visualized sinuses and mastoid air cells are clear.                                       X-Ray Chest AP Portable (Final result)  Result time 07/05/23 13:09:11      Final result by Americo Reyes MD (07/05/23 13:09:11)                   Impression:      No significant detrimental interval change in the appearance of the chest since 06/25/2023 is appreciated, with significant improvement as noted above.  No post procedure pneumothorax.      Electronically signed by: Americo Reyes MD  Date:    07/05/2023  Time:    13:09               Narrative:    EXAMINATION:  XR CHEST AP PORTABLE    TECHNIQUE:  One view was obtained.  Note is made of the fact that the thorax is obscured to some extent by opacities external to the patient, particularly defibrillator pads.    COMPARISON:  Comparison is made to the most recent prior chest radiograph of 06/25/2023.    FINDINGS:  Endotracheal tube has been placed, its tip lying just superior to the apex of the  aortic arch, well above the katharina.  Left jugular origin vascular catheter is now seen, its tip in the superior vena cava near the junction of the right and left brachiocephalic veins.  Allowing for magnification of the cardiomediastinal silhouette related to projection, the heart is not significantly enlarged.  Opacity in both inferior hemithoraces seen on the previous examination has resolved, consistent with clearing of airspace consolidation in both mid/lower lung zones and resolution of previously present bilateral pleural fluid.  Lung zones are currently clear and free of significant airspace consolidation or volume loss.  No pleural fluid of any substantial volume is seen on either side.  No pneumothorax.

## 2023-07-30 LAB
ALBUMIN SERPL BCP-MCNC: 1.9 G/DL (ref 3.5–5.2)
ALP SERPL-CCNC: 95 U/L (ref 55–135)
ALT SERPL W/O P-5'-P-CCNC: 10 U/L (ref 10–44)
ANION GAP SERPL CALC-SCNC: 5 MMOL/L (ref 8–16)
APTT PPP: 38.5 SEC (ref 21–32)
AST SERPL-CCNC: 29 U/L (ref 10–40)
BASOPHILS # BLD AUTO: 0.01 K/UL (ref 0–0.2)
BASOPHILS NFR BLD: 0.3 % (ref 0–1.9)
BILIRUB SERPL-MCNC: 3.1 MG/DL (ref 0.1–1)
BUN SERPL-MCNC: 3 MG/DL (ref 6–20)
CA-I BLDV-SCNC: 1.17 MMOL/L (ref 1.06–1.42)
CA-I BLDV-SCNC: 1.22 MMOL/L (ref 1.06–1.42)
CALCIUM SERPL-MCNC: 7.7 MG/DL (ref 8.7–10.5)
CHLORIDE SERPL-SCNC: 118 MMOL/L (ref 95–110)
CO2 SERPL-SCNC: 18 MMOL/L (ref 23–29)
CREAT SERPL-MCNC: 0.4 MG/DL (ref 0.5–1.4)
DIFFERENTIAL METHOD: ABNORMAL
EOSINOPHIL # BLD AUTO: 0.2 K/UL (ref 0–0.5)
EOSINOPHIL NFR BLD: 5.4 % (ref 0–8)
ERYTHROCYTE [DISTWIDTH] IN BLOOD BY AUTOMATED COUNT: ABNORMAL % (ref 11.5–14.5)
EST. GFR  (NO RACE VARIABLE): >60 ML/MIN/1.73 M^2
GLUCOSE SERPL-MCNC: 100 MG/DL (ref 70–110)
HCT VFR BLD AUTO: 25.8 % (ref 37–48.5)
HGB BLD-MCNC: 8.2 G/DL (ref 12–16)
IMM GRANULOCYTES # BLD AUTO: 0.01 K/UL (ref 0–0.04)
IMM GRANULOCYTES NFR BLD AUTO: 0.3 % (ref 0–0.5)
LYMPHOCYTES # BLD AUTO: 0.7 K/UL (ref 1–4.8)
LYMPHOCYTES NFR BLD: 22.7 % (ref 18–48)
MAGNESIUM SERPL-MCNC: 1.6 MG/DL (ref 1.6–2.6)
MCH RBC QN AUTO: 33.2 PG (ref 27–31)
MCHC RBC AUTO-ENTMCNC: 31.8 G/DL (ref 32–36)
MCV RBC AUTO: 105 FL (ref 82–98)
MONOCYTES # BLD AUTO: 0.3 K/UL (ref 0.3–1)
MONOCYTES NFR BLD: 8.8 % (ref 4–15)
NEUTROPHILS # BLD AUTO: 1.8 K/UL (ref 1.8–7.7)
NEUTROPHILS NFR BLD: 62.5 % (ref 38–73)
NRBC BLD-RTO: 0 /100 WBC
PLATELET # BLD AUTO: 55 K/UL (ref 150–450)
PMV BLD AUTO: 9.1 FL (ref 9.2–12.9)
POCT GLUCOSE: 105 MG/DL (ref 70–110)
POCT GLUCOSE: 111 MG/DL (ref 70–110)
POCT GLUCOSE: 134 MG/DL (ref 70–110)
POCT GLUCOSE: 136 MG/DL (ref 70–110)
POCT GLUCOSE: 95 MG/DL (ref 70–110)
POTASSIUM SERPL-SCNC: 3.6 MMOL/L (ref 3.5–5.1)
PROT SERPL-MCNC: 4.7 G/DL (ref 6–8.4)
RBC # BLD AUTO: 2.47 M/UL (ref 4–5.4)
SODIUM SERPL-SCNC: 141 MMOL/L (ref 136–145)
WBC # BLD AUTO: 2.95 K/UL (ref 3.9–12.7)

## 2023-07-30 PROCEDURE — 99900035 HC TECH TIME PER 15 MIN (STAT)

## 2023-07-30 PROCEDURE — 83735 ASSAY OF MAGNESIUM: CPT

## 2023-07-30 PROCEDURE — 85730 THROMBOPLASTIN TIME PARTIAL: CPT

## 2023-07-30 PROCEDURE — 20600001 HC STEP DOWN PRIVATE ROOM

## 2023-07-30 PROCEDURE — 80053 COMPREHEN METABOLIC PANEL: CPT

## 2023-07-30 PROCEDURE — 36415 COLL VENOUS BLD VENIPUNCTURE: CPT

## 2023-07-30 PROCEDURE — 63600175 PHARM REV CODE 636 W HCPCS: Performed by: HOSPITALIST

## 2023-07-30 PROCEDURE — 25000003 PHARM REV CODE 250: Performed by: HOSPITALIST

## 2023-07-30 PROCEDURE — 99233 PR SUBSEQUENT HOSPITAL CARE,LEVL III: ICD-10-PCS | Mod: ,,, | Performed by: HOSPITALIST

## 2023-07-30 PROCEDURE — 99233 SBSQ HOSP IP/OBS HIGH 50: CPT | Mod: ,,, | Performed by: HOSPITALIST

## 2023-07-30 PROCEDURE — 94640 AIRWAY INHALATION TREATMENT: CPT

## 2023-07-30 PROCEDURE — 94761 N-INVAS EAR/PLS OXIMETRY MLT: CPT

## 2023-07-30 PROCEDURE — 82330 ASSAY OF CALCIUM: CPT

## 2023-07-30 PROCEDURE — 27000221 HC OXYGEN, UP TO 24 HOURS

## 2023-07-30 PROCEDURE — 25000242 PHARM REV CODE 250 ALT 637 W/ HCPCS: Performed by: HOSPITALIST

## 2023-07-30 PROCEDURE — 85025 COMPLETE CBC W/AUTO DIFF WBC: CPT

## 2023-07-30 RX ADMIN — PANTOPRAZOLE SODIUM 40 MG: 40 GRANULE, DELAYED RELEASE ORAL at 08:07

## 2023-07-30 RX ADMIN — IPRATROPIUM BROMIDE AND ALBUTEROL SULFATE 3 ML: 2.5; .5 SOLUTION RESPIRATORY (INHALATION) at 09:07

## 2023-07-30 RX ADMIN — LEVETIRACETAM 1000 MG: 500 SOLUTION ORAL at 08:07

## 2023-07-30 RX ADMIN — IPRATROPIUM BROMIDE AND ALBUTEROL SULFATE 3 ML: 2.5; .5 SOLUTION RESPIRATORY (INHALATION) at 03:07

## 2023-07-30 RX ADMIN — LITHIUM CARBONATE 300 MG: 300 TABLET, FILM COATED, EXTENDED RELEASE ORAL at 08:07

## 2023-07-30 RX ADMIN — RIFAXIMIN 550 MG: 550 TABLET ORAL at 08:07

## 2023-07-30 RX ADMIN — LACTULOSE 30 G: 20 SOLUTION ORAL at 08:07

## 2023-07-30 RX ADMIN — LACTULOSE 30 G: 20 SOLUTION ORAL at 03:07

## 2023-07-30 RX ADMIN — IPRATROPIUM BROMIDE AND ALBUTEROL SULFATE 3 ML: 2.5; .5 SOLUTION RESPIRATORY (INHALATION) at 07:07

## 2023-07-30 RX ADMIN — OLANZAPINE 5 MG: 5 TABLET, FILM COATED ORAL at 08:07

## 2023-07-30 RX ADMIN — CEFTRIAXONE 1 G: 1 INJECTION, POWDER, FOR SOLUTION INTRAMUSCULAR; INTRAVENOUS at 08:07

## 2023-07-30 NOTE — PLAN OF CARE
Problem: Infection  Goal: Absence of Infection Signs and Symptoms  Outcome: Ongoing, Progressing    Problem: Adult Inpatient Plan of Care  Goal: Plan of Care Review  Outcome: Ongoing, Progressing  Goal: Patient-Specific Goal (Individualized)  Outcome: Ongoing, Progressing  Goal: Absence of Hospital-Acquired Illness or Injury  Outcome: Ongoing, Progressing  Goal: Optimal Comfort and Wellbeing  Outcome: Ongoing, Progressing  Goal: Readiness for Transition of Care  Outcome: Ongoing, Progressing   Antibiotics provided as ordered. Routine turning performed with reassurance prn. Pt remains confused.

## 2023-07-30 NOTE — PLAN OF CARE
Problem: Infection  Goal: Absence of Infection Signs and Symptoms  Outcome: Ongoing, Progressing     Problem: Adult Inpatient Plan of Care  Goal: Plan of Care Review  Outcome: Ongoing, Progressing  Goal: Patient-Specific Goal (Individualized)  Outcome: Ongoing, Progressing  Goal: Absence of Hospital-Acquired Illness or Injury  Outcome: Ongoing, Progressing   Pt rested well. VS WNL. No ss of pain or distress. Call light within reach. Bed in low position. Bed alarm on. Will continue to monitor and pass on care to oncoming nurse.

## 2023-07-30 NOTE — PROGRESS NOTES
Jimmy Phillip - Telemetry [facility] Medicine  Progress Note    Patient Name: Marely Hamilton  MRN: [id number]  Patient Class: IP- Inpatient   Admission Date: 7/5/2023  Length of Stay: 25 days  Attending Physician: Seth Noyola MD  Primary Care Provider: Viktor Ross MD        Subjective:     Principal Problem:Gastrointestinal hemorrhage associated with duodenal ulcer        HPI:  Marely Hamilton is a 58 y.o. female with history of alcoholic cirrhosis, seizure disorder, DVT and IJ thrombus with recent admission for large retroperitoneal hemorrhage requiring IR embolization and IVC filter placement who presents for hematochezia. Onset this morning at [facility], alida blood per rectum per chart, sent to ED. Initially normotensive but then severe hypotension prompted initiation of levophed and intubation. Hgb 6.8, 2u pRBC given + 1u FFP ordered. Hgb 8.5 on 7/2. On levo and vaso currently. K 6.6 but renal function at baseline. Repeat pending. No prior EGDs on record.          Overview/Hospital Course:    Patient was seen at bedside, hematochezia still present post op by IR. Patient has required many transfusions of pRBCs and platelets due to ongoing down trending of Hgb. No clear source of ongoing hemorrhage by imaging. Patient has continued to require pressors to maintain MAP >65. Discussed with GI and IR regarding active bleeding post op, IR indicated there is not much else to do from their end bc they embolized a significant part of GDA. Pt is off of pressor requirement and not actively bleeding. GI re evaluated pt via EGD and found no sources of active bleeding. Pt is extubated and currently on BIPAP requirement due to de saturation in the 80's. Pt is to be seen by speech and PT/OT. Clear mucinous secretions via suction, chest physiotherapy, and cough assist device. Nebs to help with breathing as well. Pt is off BIPAP and currently on comfort flow. Triple therapy (amoxicillin, clarithromycin) started for 14 days  to treat for positive H pylori serology. Pt also had a NG tube placed so we may begin tube feedings. CxR, EKG, and ABG displayed no reason for tachycardia. Pt becomes anxious when seen by different provider teams. Remove art line and consult for midline placement. Continue to wean comfort flow as tolerated. Ensure pt is working with PT/OT/Speech for continuous improvement. Consult psych regarding patient history of bipolar disorder.         Interval History/Significant Events: Wean comfort flow as tolerated. Ensure patient is working with PT/OT/Speech for continuous improvement. Consult psych regarding psych history     7/16   history of alcoholic cirrhosis, seizure disorder, DVT and IJ thrombus with recent admission for large retroperitoneal hemorrhage requiring IR embolization and IVC filter placement who presents for hematochezia. s/p GI and IR in which they clipped (GI) and embolized (IR) possible sources of duodenal ulcer bleeding.  Hb stable  s/p extubation. sats 92% on 5L of NC.  CX ray - Detrimental changes since the previous examination including interval increase in pulmonary vascularity and bilateral diffuse interstitial pulmonary parenchymal opacification, progression of abnormal opacification at the left lung base, and development of small right-sided pleural effusion.   findings would be consistent with congestive heart failure.PT/OT recs SNF. BNP , procalcitonin and Echo.  SLP recs NPO .  On NGT feeds. psychiatry following for bipolar disorders.  Recs Zyprexa 5 mg QHS . Ammonia 48 WNL. AAOX 1. Hepatology consulted for cirrhosis. UA ordered   7/17 Saldaña removed. UA +. UC pending. started on IV ceftriaxone. ammonia at 59. on B/L UE mittens as pulled of NGT. Hepatology eval. resumed lactulose . discussed with sister and updated clinical status   7/18  MBS today - started puree nectar thick liquids.  on oxygen 3L via NC.   wean  as tolerated . UC -YEAST 10,000 - 49,999 cfu/ml Identification pending No  other significant isolate. hydroxyzine  PRN discontinued  due to it not being safe in delirium. oriented to person, hospital and year   7/19 SLP recs - tolerating Puree Diet - IDDSI Level 4, Mildly thick/Nectar thick liquids - IDDSI Level 2 .stool output 600ml/ monitor on lactulose . improved mentation AAOX 3  7/26 Await NH placement. Little improvement in functional status. Tunneled fistula seen near rectum. Low grade temp.  7/26 K replaced. Await NH placement. Little improvement in functional status. Tunneled fistula seen near rectum. Low grade temp. Consulted psych for recs concerning discharge.   7/27 Sister wanted her started back on lithium but psychiatry recommended  increasing Zyprexa 7.5mg QHS. EKG to monitor QtcDiscontinue zyprexa if Qtc >480. EKG QTC 493ms.  Zyprexa resumed at  zyprexa 5 nightly per psychiatry . Urinary retention this AM s/p straight cath   7/28 continues with confusion. UA + . UC pending. continue ceftriaxone. no fistula found on scaral exam with wound care.  Repeat EKG today to monitor Qtc. Saldaña catheter placed for urinary retention .  started  lithium 300 mg  nightly due to concern for manic episode  7/29 SLP recs - Mechanical soft, Thin . UC - GRAM NEGATIVE BILLY 10,000 - 49,999 cfu/ml  Identification and susceptibility pending  .ammonia elevated at 70. lactulose increased to 30g TID .started rifaximin BID   7/30 BMX 1 documented . pending mental status improvement           Review of Systems:   Pain scale:   Constitutional:  fever,  chills, headache, vision loss, hearing loss, weight loss, Generalized weakness, falls, loss of smell, loss of taste, poor appetite,  sore throat  Respiratory: cough, shortness of breath.   Cardiovascular: chest pain, dizziness, palpitations, orthopnea, swelling of feet, syncope  Gastrointestinal: nausea, vomiting, abdominal pain, diarrhea, black stool,  blood in stool, change in bowel habits  Genitourinary: hematuria, dysuria, urgency,  frequency  Integument/Breast: rash,  pruritis  Hematologic/Lymphatic: easy bruising, lymphadenopathy  Musculoskeletal: arthralgias , myalgias, back pain, neck pain, knee pain  Neurological: confusion, seizures, tremors, slurred speech  Behavioral/Psych:  depression, anxiety, auditory or visual hallucinations     OBJECTIVE:     Physical Exam:  Body mass index is 24.69 kg/m².    Constitutional: Appears well-developed and well-nourished.   Head: Normocephalic and atraumatic. + icterus  Neck: Normal range of motion. Neck supple.   Cardiovascular: Normal heart rate.  Regular heart rhythm.  Pulmonary/Chest: Effort normal.   Abdominal: No distension.  No tenderness.  baugh catheter   Musculoskeletal: Normal range of motion.   Neurological: Alert and  oriented to person, hospital follows simple commands .not oriented to situation  Skin: Skin is warm and dry. ehcymoses on the upper chest    Psychiatric: Normal mood and affect. Behavior is normal.                  Vital Signs  Temp: 98.9 °F (37.2 °C) (07/30/23 1149)  Pulse: 80 (07/30/23 1149)  Resp: 18 (07/30/23 1149)  BP: (Abnormal) 126/58 (07/30/23 1149)  SpO2: 100 % (07/30/23 1149)     24 Hour VS Range    Temp:  [96.2 °F (35.7 °C)-98.9 °F (37.2 °C)]   Pulse:  []   Resp:  [16-20]   BP: (120-135)/(58-64)   SpO2:  [97 %-100 %]     Intake/Output Summary (Last 24 hours) at 7/30/2023 1238  Last data filed at 7/30/2023 0550  Gross per 24 hour   Intake 770 ml   Output 1680 ml   Net -910 ml         I/O This Shift:  No intake/output data recorded.    Wt Readings from Last 3 Encounters:   07/28/23 61.2 kg (135 lb)   07/03/23 44.7 kg (98 lb 8.7 oz)   06/29/23 59.9 kg (132 lb 0.9 oz)       I have personally reviewed the vitals and recorded Intake/Output     Laboratory/Diagnostic Data:    CBC/Anemia Labs: Coags:    Recent Labs   Lab 07/28/23  0611 07/29/23  0346 07/30/23  0557   WBC 1.89* 2.69* 2.95*   HGB 7.7* 8.4* 8.2*   HCT 25.2* 26.5* 25.8*   PLT 57* 61* 55*   * 104*  "105*   RDW SEE COMMENT SEE COMMENT SEE COMMENT    Recent Labs   Lab 07/28/23  0611 07/29/23  0346 07/30/23  0557   APTT 38.0* 34.7* 38.5*        Chemistries: ABG:   Recent Labs   Lab 07/28/23  0611 07/28/23  0612 07/29/23  0346 07/30/23  0557     --  143 141   K 3.9  --  3.8 3.6   *  --  117* 118*   CO2 18*  --  20* 18*   BUN 4*  --  4* 3*   CREATININE 0.4*  --  0.4* 0.4*   CALCIUM 7.7*  --  7.9* 7.7*   PROT 4.4*  --  4.9* 4.7*   BILITOT 3.0*  --  3.5* 3.1*   ALKPHOS 92  --  102 95   ALT 12  --  13 10   AST 27  --  31 29   MG  --  1.6 1.6 1.6    No results for input(s): "PH", "PCO2", "PO2", "HCO3", "POCSATURATED", "BE" in the last 168 hours.       POCT Glucose: HbA1c:    Recent Labs   Lab 07/29/23  1138 07/29/23  1144 07/29/23  2339 07/30/23  0541 07/30/23  0759 07/30/23  1148   POCTGLUCOSE 198* 120* 142* 111* 95 136*    Hemoglobin A1C   Date Value Ref Range Status   05/29/2023 <4.0 (A) 4.0 - 5.6 % Final     Comment:     ADA Screening Guidelines:  5.7-6.4%  Consistent with prediabetes  >or=6.5%  Consistent with diabetes    High levels of fetal hemoglobin interfere with the HbA1C  assay. Heterozygous hemoglobin variants (HbS, HgC, etc)do  not significantly interfere with this assay.   However, presence of multiple variants may affect accuracy.  Falsely low HA1c results may be observed in patients   with clinical conditions that shorten erythrocyte   life span or decrease mean erythrocyte age.  HA1c   may not accurately reflect glycemic control when   clinical conditions that affect erythrocyte survival   are present. Fructosamine may be used as an alternate  measurement of glycemic control.     01/22/2021 4.1 4.0 - 5.6 % Final     Comment:     ADA Screening Guidelines:  5.7-6.4%  Consistent with prediabetes  >or=6.5%  Consistent with diabetes  High levels of fetal hemoglobin interfere with the HbA1C  assay. Heterozygous hemoglobin variants (HbS, HgC, etc)do  not significantly interfere with this assay. " "  However, presence of multiple variants may affect accuracy.     12/10/2020 4.6 4.0 - 5.6 % Final     Comment:     ADA Screening Guidelines:  5.7-6.4%  Consistent with prediabetes  >or=6.5%  Consistent with diabetes  High levels of fetal hemoglobin interfere with the HbA1C  assay. Heterozygous hemoglobin variants (HbS, HgC, etc)do  not significantly interfere with this assay.   However, presence of multiple variants may affect accuracy.          Cardiac Enzymes: Ejection Fractions:    No results for input(s): "CPK", "CPKMB", "MB", "TROPONINI" in the last 72 hours. EF   Date Value Ref Range Status   07/16/2023 65 % Final   06/03/2023 70 % Final          No results for input(s): "COLORU", "APPEARANCEUA", "PHUR", "SPECGRAV", "PROTEINUA", "GLUCUA", "KETONESU", "BILIRUBINUA", "OCCULTUA", "NITRITE", "UROBILINOGEN", "LEUKOCYTESUR", "RBCUA", "WBCUA", "BACTERIA", "SQUAMEPITHEL", "HYALINECASTS" in the last 48 hours.    Invalid input(s): "WRIGHTSUR"        Procalcitonin (ng/mL)   Date Value   06/16/2023 0.26 (H)     Lactate (Lactic Acid) (mmol/L)   Date Value   07/16/2023 1.4   07/16/2023 1.4   07/06/2023 1.5   07/05/2023 8.0 (HH)   06/11/2023 6.2 (HH)     BNP (pg/mL)   Date Value   07/16/2023 174 (H)     No results found for: "CRP", "SEDRATE"  D-Dimer (mg/L FEU)   Date Value   07/07/2023 2.22 (H)     Ferritin (ng/mL)   Date Value   05/18/2023 110   10/23/2015 412 (H)   09/19/2015 599 (H)   09/19/2015 599 (H)   07/23/2015 551 (H)   06/17/2015 612 (H)     LD (U/L)   Date Value   06/05/2023 249   05/31/2023 426 (H)   09/19/2015 280 (H)     Troponin I (ng/mL)   Date Value   06/13/2023 0.029 (H)   06/13/2023 0.030 (H)   05/18/2023 0.035 (H)   05/18/2023 0.037 (H)   05/05/2023 <0.006   01/25/2023 <0.006     CPK (U/L)   Date Value   05/05/2023 47   01/25/2023 27   06/14/2020 23 (L)     CK (U/L)   Date Value   04/15/2023 98     Results for orders placed or performed during the hospital encounter of 05/18/23   Vitamin D   Result " Value Ref Range    Vit D, 25-Hydroxy 15 (L) 30 - 96 ng/mL     SARS-CoV2 (COVID-19) Qualitative PCR (no units)   Date Value   07/26/2023 Not Detected   06/30/2023 Not Detected   02/14/2023 Not Detected     SARS-CoV-2 RNA, Amplification, Qual (no units)   Date Value   05/28/2023 Negative   11/02/2021 Negative   01/21/2021 Negative   01/11/2021 Negative   11/21/2020 Negative   06/16/2020 Negative       Microbiology labs for the last week  Microbiology Results (last 7 days)       Procedure Component Value Units Date/Time    Urine culture [[id number]]  (Abnormal) Collected: 07/28/23 0541    Order Status: Completed Specimen: Urine Updated: 07/29/23 0903     Urine Culture, Routine GRAM NEGATIVE BILLY  10,000 - 49,999 cfu/ml  Identification and susceptibility pending      Narrative:      Specimen Source->Urine            Reviewed and noted in plan where applicable- Please see chart for full lab data.    Lines/Drains:       Peripheral IV - Single Lumen 07/12/23 1148 20 G;1 3/4 in Left Forearm (Active)   Site Assessment Clean;Dry;Intact 07/16/23 0701   Extremity Assessment Distal to IV No abnormal discoloration 07/16/23 0701   Line Status No blood return;Flushed;Saline locked 07/16/23 0701   Dressing Status Dry;Clean;Intact 07/16/23 0701   Dressing Intervention Integrity maintained 07/16/23 0701   Dressing Change Due 07/16/23 07/16/23 0701   Site Change Due 07/16/23 07/16/23 0701   Reason Not Rotated Not due 07/16/23 0701   Number of days: 3            Peripheral IV - Single Lumen 07/14/23 1540 20 G;1 3/4 in Right Upper Arm (Active)   Site Assessment Intact;Clean;Dry 07/16/23 0701   Extremity Assessment Distal to IV No abnormal discoloration 07/16/23 0701   Line Status Blood return noted;Flushed;Saline locked 07/16/23 0701   Dressing Status Dry;Intact;Clean 07/16/23 0701   Dressing Intervention Integrity maintained 07/16/23 0701   Dressing Change Due 07/18/23 07/16/23 0701   Site Change Due 07/18/23 07/16/23 0701   Reason Not  "Rotated Not due 07/16/23 0701   Number of days: 1            NG/OG Tube 07/13/23 1311 [place] sump 18 Fr. Right nostril (Active)   Placement Check placement verified by aspirate characteristics 07/16/23 0701   Tolerance no signs/symptoms of discomfort 07/16/23 0701   Securement secured to nostril center w/ adhesive device 07/16/23 0701   Clamp Status/Tolerance unclamped 07/16/23 0701   Suction Setting/Drainage Method suction at the bedside 07/16/23 0701   Insertion Site Appearance no redness, warmth, tenderness, skin breakdown, drainage 07/16/23 0701   Flush/Irrigation flushed w/;water 07/16/23 0502   Feeding Type continuous;by pump 07/16/23 0701   Feeding Action feeding continued 07/16/23 0701   Current Rate (mL/hr) 50 mL/hr 07/16/23 0701   Goal Rate (mL/hr) 50 mL/hr 07/16/23 0701   Intake (mL) 20 mL 07/15/23 0800   Water Bolus (mL) 250 mL 07/16/23 0501   Rate Formula Tube Feeding (mL/hr) 20 mL/hr 07/14/23 0400   Formula Name Isosource 1.5 07/16/23 0701   Intake (mL) - Formula Tube Feeding 50 07/16/23 0700   Residual Amount (ml) 30 ml 07/15/23 2337   Number of days: 2            Urethral Catheter 07/05/23 1332 Double-lumen (Active)   Site Assessment Clean;Intact 07/16/23 0701   Collection Container Urimeter 07/16/23 0701   Securement Method secured to top of thigh w/ adhesive device 07/16/23 0701   Catheter Care Performed yes 07/16/23 0701   Reason for Continuing Urinary Catheterization Non-healing sacral/perineal wound 07/16/23 0701   CAUTI Prevention Bundle Securement Device in place with 1" slack;Intact seal between catheter & drainage tubing;Drainage bag/urimeter off the floor;Sheeting clip in use;No dependent loops or kinks;Drainage bag/urimeter not overfilled (<2/3 full);Drainage bag/urimeter below bladder 07/16/23 0701   Output (mL) 75 mL 07/16/23 0738   Number of days: 10            Fecal Incontinence  07/09/23 0837 (Active)   $ Fecal Management Supplies Fecal Management System (Supply) 07/14/23 1954 "   Application fecal incontinence  in place 07/16/23 0701   Drainage Method attached to drainage bag 07/16/23 0701   Securement to gravity 07/16/23 0701   Skin cleansed, skin barrier applied 07/16/23 0701   Tolerance no signs/symptoms of discomfort 07/16/23 0701   Stool (mL) 200 mL 07/16/23 0501   Number of days: 6       Imaging  ECG Results              EKG 12-lead (Final result)  Result time 07/05/23 13:56:05      Final result by Interface, Lab In [facility] (07/05/23 13:56:05)               Narrative:    Test Reason : E87.5,    Vent. Rate : 103 BPM     Atrial Rate : 103 BPM     P-R Int : 132 ms          QRS Dur : 076 ms      QT Int : 342 ms       P-R-T Axes : 073 054 084 degrees     QTc Int : 448 ms    Age and gender specific analysis  Sinus tachycardia  Low voltage QRS  Low anterior forces and R wave progression  Possibly acute STEMI    ACUTE MI / STEMI    Abnormal ECG  When compared with ECG of 05-JUL-2023 11:07,  No significant change was found  Confirmed by Abimael CLEMENTS MD ([id number]) on 7/5/2023 1:55:55 PM    Referred By: [email or web address]   SELF           Confirmed By:Abimael CLEMENTS MD                                   EKG 12-lead (Final result)  Result time 07/05/23 14:01:06      Final result by Interface, Lab In [facility] (07/05/23 14:01:06)               Narrative:    Test Reason : K92.2,    Vent. Rate : 096 BPM     Atrial Rate : 096 BPM     P-R Int : 114 ms          QRS Dur : 066 ms      QT Int : 352 ms       P-R-T Axes : 078 090 065 degrees     QTc Int : 444 ms    Normal sinus rhythm  Vertical axis  Otherwise normal ECG  When compared with ECG of 27-JUN-2023 12:05,  T wave inversion no longer evident in Anterior leads  Confirmed by Cameron Hodge MD ([id number]) on 7/5/2023 2:01:00 PM    Referred By: System System           Confirmed By:Cameron Hodge MD                                  Results for orders placed during the hospital encounter of 05/28/23    Echo    Interpretation Summary  · The left ventricle is normal in  size with hyperdynamic systolic function. The estimated ejection fraction is 70%.  · Normal right ventricular size with normal right ventricular systolic function.  · Normal left ventricular diastolic function.  · Mild left atrial enlargement.  · Mechanically ventilated; cannot use inferior caval vein diameter to estimate central venous pressure.  · Trivial pericardial effusion.  · There is a left pleural effusion.      X-Ray Chest AP Portable  Narrative: EXAMINATION:  XR CHEST AP PORTABLE    CLINICAL HISTORY:  Fever;    TECHNIQUE:  Single frontal view of the chest was performed.    COMPARISON:  No 07/16/2023 ne    FINDINGS:  Mild cardiomegaly.  Mild edema.  Small ill-defined opacities noted adjacent to the left hilum similar to the previous study.  Opacification at the left lung base probably atelectatic changes and  smaller amount of pleural effusion.  Impression: No significant changes    Electronically signed by: Americo Graf MD  Date:    07/25/2023  Time:    12:17      Labs, Imaging, EKG and Diagnostic results from 7/30/2023 were reviewed.    Medications:  Medication list was reviewed and changes noted under Assessment/Plan.  No current facility-administered medications on file prior to encounter.     Current Outpatient Medications on File Prior to Encounter   Medication Sig Dispense Refill    ARIPiprazole (ABILIFY) 20 MG Tab Take 5 mg by mouth once daily.      folic acid (FOLVITE) 1 MG tablet Take 1 tablet (1 mg total) by mouth once daily. (Patient taking differently: Take 1 mg by mouth every evening.) 30 tablet 2    furosemide (LASIX) 20 MG tablet Take 20 mg by mouth once daily.      lactulose (CHRONULAC) 10 gram/15 mL solution Take 10 g by mouth 3 (three) times daily.      lithium carbonate 150 MG capsule Take 1 capsule (150 mg total) by mouth every evening. (Patient taking differently: Take 150 mg by mouth 2 (two) times a day.) 30 capsule 11    magnesium oxide (MAG-OX) 400 mg (241.3 mg magnesium) tablet  Take by mouth once daily.      pantoprazole (PROTONIX) 40 MG tablet Take 40 mg by mouth once daily.      propranoloL (INDERAL) 40 MG tablet Take 40 mg by mouth once daily.      QUEtiapine (SEROQUEL) 300 MG Tab Take 100 mg by mouth every evening.      sodium bicarbonate 650 MG tablet Take 650 mg by mouth Daily.      spironolactone (ALDACTONE) 50 MG tablet Take 1 tablet (50 mg total) by mouth once daily. 90 tablet 3    zonisamide (ZONEGRAN) 100 MG Cap Take 100 mg by mouth once daily.      levETIRAcetam (KEPPRA) 1000 MG tablet Take 1,000 mg by mouth 2 (two) times daily.      OLANZapine (ZYPREXA) 10 MG tablet Take 1 tablet (10 mg total) by mouth every evening. (Patient not taking: Reported on 7/6/2023) 30 tablet 11    rifAXIMin (XIFAXAN) 550 mg Tab Take 1 tablet (550 mg total) by mouth 2 (two) times daily. (Patient not taking: Reported on 7/6/2023) 180 tablet 3     Scheduled Medications:  albuterol-ipratropium, 3 mL, Nebulization, Q6H WAKE  cefTRIAXone (ROCEPHIN) IVPB, 1 g, Intravenous, Q24H  lactulose, 30 g, Oral, TID  levetiracetam, 1,000 mg, Oral, BID  lithium, 300 mg, Oral, QHS  OLANZapine, 5 mg, Oral, QHS  pantoprazole, 40 mg, Oral, BID  rifAXImin, 550 mg, Oral, BID      PRN: dextrose 10%, dextrose 10%, glucagon (human recombinant), insulin aspart U-100, oxyCODONE, sodium chloride 0.9%  Infusions:       Estimated Creatinine Clearance: 133.5 mL/min (A) (based on SCr of 0.4 mg/dL (L)).    Assessment/Plan:      * Gastrointestinal hemorrhage associated with duodenal ulcer    as  above        Urinary retention  7/27  2 episodes. s/p straight cath   7/28 Saldaña catheter placed for urinary retention         Irritant contact dermatitis due to fecal, urinary or dual incontinence  wound care eval      Dysphagia  7/18  MBS today -started puree nectar thick liquids.    7/29 SLP recs - Mechanical soft, Thin  liquids     Hypoxia   7/16 sats 92% on 5L of NC.  CX ray - Detrimental changes since the previous examination including  interval increase in pulmonary vascularity and bilateral diffuse interstitial pulmonary parenchymal opacification, progression of abnormal opacification at the left lung base, and development of small right-sided pleural effusion.   findings would be consistent with congestive heart failure.PT/OT recs SNF. BNP , procalcitonin and Echo.   7/27 Sats 97% on RA    H. pylori infection    positive serology for H pylori, begin triple therapy with clarithromycin,amoxicillin, and PPI for 14 days       Hematochezia    - GI EGD clipped duodenal ulcers for IR reference  - Transfuse blood products for active bleeding   - trend CBC and follow  - IR embolized GDA for duodenal hyperemia   -- GI re evaluated site of duodenal ulcers, no noted active bleeding  -- Continue PPI BID for 8 weeks, then once daily after that  --Pt had positive serology for H pylori, had triple therapy with clarithromycin,amoxicillin, and PPI for 14 days      Acute blood loss anemia     - Maintain IV access with 2 large bore Ivs  - Intravascular resuscitation/support with IVFs   - Hold all NSAIDs and anticoagulants, unless contraindicated.  - Please correct any coagulopathy with platelets and FFP for goal of platelets >50K and INR <2.0  - Please notify GI team if there is significant change in patient's clinical status  - To date, patient has required at least 21 units of pRBCs to maintain Hgb >7     7/16     Patient's with macrocytic anemia.. Hemoglobin stable. Etiology likely due to acute blood loss.  Current CBC reviewed-    Recent Labs   Lab 07/28/23  0611 07/29/23  0346 07/30/23  0557   HGB 7.7* 8.4* 8.2*         Component Value Date/Time     (H) 07/30/2023 0557    RDW SEE COMMENT 07/30/2023 0557    IRON 132 05/18/2023 1527    FERRITIN 110 05/18/2023 1527    RETIC 3.9 (H) 06/05/2023 0935    FOLATE 11.5 07/17/2023 0619    [id number] 07/17/2023 0619    OCCULTBLOOD Negative 09/19/2015 0137     Monitor CBC and transfuse if H/H drops below 7/21.         Retroperitoneal bleed  Retroperitoneum: No significant adenopathy.  Similar sized left retroperitoneal hematoma measuring 11 x 9 cm (series 5, image 100; previously 11 x 9 cm).  The left iliacus collection also measures similar to prior at 5.4 cm (series 5, image 127; previously 5.6 cm).  Layering hyperdensity within these collections suggesting different ages in blood products.  No significant volume active extravasation into these collections  - CT-A demonstrated stable retroperitoneal hematoma. No need for intervention at this time.          Supratherapeutic INR  patient with INR   Recent Labs   Lab 07/23/23  0434   INR 1.5*   . INR is supratherapeutic. secondary to coagulopathy from liver disease.monitor      Hypokalemia  replaced       UTI (urinary tract infection)  7/17 Saldaña removed. UA +. UC pending. started on IV ceftriaxone.  7/18 UC -YEAST 10,000 - 49,999 cfu/ml Identification pending No other significant isolate  7/28 continues with confusion. UA + . UC pending. continue ceftriaxone. no fistula found on sacral exam with wound care.   7/29   UC - GRAM NEGATIVE BILLY 10,000 - 49,999 cfu/ml  Identification and susceptibility pending       Bipolar 1 disorder     - Consult psych regarding Bipolar 1 disorder history  7/27 Sister wanted her started back on lithium but psychiatry recommended  increasing Zyprexa 7.5mg QHS. EKG to monitor Qtc.Discontinue zyprexa if Qtc >480.  EKG QTC 493ms.  Zyprexa swtiched to 5mg HS  7/28  started  lithium 300 mg  nightly due to concern for manic episode       Hepatic encephalopathy   ammonia 190s on admit -->90s . AAOX 1. no lactulose give due to GI bleed  repeat ammonia. Hepatolog eval  with     MELD 3.0: 19 at 7/25/2023  2:48 AM  MELD-Na: 15 at 7/25/2023  2:48 AM  Calculated from:  Serum Creatinine: 0.3 mg/dL (Using min of 1 mg/dL) at 7/25/2023  2:48 AM  Serum Sodium: 143 mmol/L (Using max of 137 mmol/L) at 7/25/2023  2:48 AM  Total Bilirubin: 3.1 mg/dL at 7/25/2023   2:48 AM  Serum Albumin: 1.8 g/dL at 7/25/2023  2:48 AM  INR(ratio): 1.5 at 7/23/2023  4:34 AM  Age at listing (hypothetical): 58 years  Sex: Female at 7/25/2023  2:48 AM  7/16 Ammonia 48 WNL. AAOX 1. Hepatology consulted for cirrhosis.    7/17  Hepatology eval. resumed lactulose.  7/18  hydroxyzine  PRN  discontinued  due to it not being safe in delirium  7/29 ammonia elevated at 70. lactulose increased to 30g TID.  started rifaximin BID     Severe protein-calorie malnutrition  Nutrition consulted. Most recent weight and BMI monitored-     Measurements:  Wt Readings from Last 1 Encounters:   07/27/23 61.2 kg (135 lb)   Body mass index is 24.69 kg/m².    Patient has been screened and assessed by RD.    Malnutrition Type:  Context: social/environmental circumstances  Level: severe    Malnutrition Characteristic Summary:  Energy Intake (Malnutrition): less than or equal to 75% for greater than or equal to 1 month  Subcutaneous Fat (Malnutrition): severe depletion  Muscle Mass (Malnutrition): severe depletion      History of seizure  on Keppra       Thrombocytopenia  with cirrhosis   Patient with thrombocytopenia   Recent Labs   Lab 07/28/23  0611 07/29/23  0346 07/30/23  0557   PLT 57* 61* 55*   . Platelet counts stable  .monitor      Cirrhosis, Laennec's  alcoholic cirrhosis  MELD 3.0: 19 at 7/25/2023  2:48 AM  MELD-Na: 15 at 7/25/2023  2:48 AM  Calculated from:  Serum Creatinine: 0.3 mg/dL (Using min of 1 mg/dL) at 7/25/2023  2:48 AM  Serum Sodium: 143 mmol/L (Using max of 137 mmol/L) at 7/25/2023  2:48 AM  Total Bilirubin: 3.1 mg/dL at 7/25/2023  2:48 AM  Serum Albumin: 1.8 g/dL at 7/25/2023  2:48 AM  INR(ratio): 1.5 at 7/23/2023  4:34 AM  Age at listing (hypothetical): 58 years  Sex: Female at 7/25/2023  2:48 AM     No results for input(s): AMMONIA in the last 168 hours.  Strict input /output monitor, daily weights        VTE Risk Mitigation (From admission, onward)           Ordered     Place sequential  compression device  Until discontinued         07/14/23 1024     Reason for No Pharmacological VTE Prophylaxis  Once        Question:  Reasons:  Answer:  Active Bleeding    07/05/23 1334     IP VTE HIGH RISK PATIENT  Once         07/05/23 1334                    Discharge Planning   BRAYAN: 8/1/2023     Code Status: DNR   Is the patient medically ready for discharge?: No    Reason for patient still in hospital (select all that apply): Treatment  Discharge Plan A: Skilled Nursing Facility   Discharge Delays: None known at this time              Seth Noyola MD  Department of Hospital Medicine   [facility] - Telemetry Stepdown

## 2023-07-31 LAB
ALBUMIN SERPL BCP-MCNC: 1.9 G/DL (ref 3.5–5.2)
ALP SERPL-CCNC: 102 U/L (ref 55–135)
ALT SERPL W/O P-5'-P-CCNC: 13 U/L (ref 10–44)
ANION GAP SERPL CALC-SCNC: 6 MMOL/L (ref 8–16)
APTT PPP: 39.3 SEC (ref 21–32)
AST SERPL-CCNC: 32 U/L (ref 10–40)
BACTERIA UR CULT: ABNORMAL
BACTERIA UR CULT: ABNORMAL
BASOPHILS # BLD AUTO: 0.01 K/UL (ref 0–0.2)
BASOPHILS NFR BLD: 0.5 % (ref 0–1.9)
BILIRUB SERPL-MCNC: 3.2 MG/DL (ref 0.1–1)
BUN SERPL-MCNC: 3 MG/DL (ref 6–20)
CA-I BLDV-SCNC: 1.12 MMOL/L (ref 1.06–1.42)
CA-I BLDV-SCNC: 1.14 MMOL/L (ref 1.06–1.42)
CALCIUM SERPL-MCNC: 7.9 MG/DL (ref 8.7–10.5)
CHLORIDE SERPL-SCNC: 116 MMOL/L (ref 95–110)
CO2 SERPL-SCNC: 16 MMOL/L (ref 23–29)
CREAT SERPL-MCNC: 0.4 MG/DL (ref 0.5–1.4)
DIFFERENTIAL METHOD: ABNORMAL
EOSINOPHIL # BLD AUTO: 0.2 K/UL (ref 0–0.5)
EOSINOPHIL NFR BLD: 7.3 % (ref 0–8)
ERYTHROCYTE [DISTWIDTH] IN BLOOD BY AUTOMATED COUNT: ABNORMAL % (ref 11.5–14.5)
EST. GFR  (NO RACE VARIABLE): >60 ML/MIN/1.73 M^2
GLUCOSE SERPL-MCNC: 84 MG/DL (ref 70–110)
HCT VFR BLD AUTO: 29 % (ref 37–48.5)
HGB BLD-MCNC: 8.8 G/DL (ref 12–16)
IMM GRANULOCYTES # BLD AUTO: 0 K/UL (ref 0–0.04)
IMM GRANULOCYTES NFR BLD AUTO: 0 % (ref 0–0.5)
LYMPHOCYTES # BLD AUTO: 0.6 K/UL (ref 1–4.8)
LYMPHOCYTES NFR BLD: 26.6 % (ref 18–48)
MAGNESIUM SERPL-MCNC: 1.6 MG/DL (ref 1.6–2.6)
MCH RBC QN AUTO: 32.5 PG (ref 27–31)
MCHC RBC AUTO-ENTMCNC: 30.3 G/DL (ref 32–36)
MCV RBC AUTO: 107 FL (ref 82–98)
MONOCYTES # BLD AUTO: 0.2 K/UL (ref 0.3–1)
MONOCYTES NFR BLD: 9.2 % (ref 4–15)
NEUTROPHILS # BLD AUTO: 1.2 K/UL (ref 1.8–7.7)
NEUTROPHILS NFR BLD: 56.4 % (ref 38–73)
NRBC BLD-RTO: 0 /100 WBC
PLATELET # BLD AUTO: 58 K/UL (ref 150–450)
PMV BLD AUTO: 10.7 FL (ref 9.2–12.9)
POCT GLUCOSE: 169 MG/DL (ref 70–110)
POCT GLUCOSE: 89 MG/DL (ref 70–110)
POTASSIUM SERPL-SCNC: 3.6 MMOL/L (ref 3.5–5.1)
PROT SERPL-MCNC: 4.8 G/DL (ref 6–8.4)
RBC # BLD AUTO: 2.71 M/UL (ref 4–5.4)
SODIUM SERPL-SCNC: 138 MMOL/L (ref 136–145)
WBC # BLD AUTO: 2.18 K/UL (ref 3.9–12.7)

## 2023-07-31 PROCEDURE — 94761 N-INVAS EAR/PLS OXIMETRY MLT: CPT

## 2023-07-31 PROCEDURE — 80053 COMPREHEN METABOLIC PANEL: CPT

## 2023-07-31 PROCEDURE — 27000221 HC OXYGEN, UP TO 24 HOURS

## 2023-07-31 PROCEDURE — 99233 SBSQ HOSP IP/OBS HIGH 50: CPT | Mod: ,,, | Performed by: HOSPITALIST

## 2023-07-31 PROCEDURE — 82330 ASSAY OF CALCIUM: CPT

## 2023-07-31 PROCEDURE — 85025 COMPLETE CBC W/AUTO DIFF WBC: CPT

## 2023-07-31 PROCEDURE — 25000003 PHARM REV CODE 250: Performed by: HOSPITALIST

## 2023-07-31 PROCEDURE — 25000242 PHARM REV CODE 250 ALT 637 W/ HCPCS: Performed by: HOSPITALIST

## 2023-07-31 PROCEDURE — 20600001 HC STEP DOWN PRIVATE ROOM

## 2023-07-31 PROCEDURE — 99232 PR SUBSEQUENT HOSPITAL CARE,LEVL II: ICD-10-PCS | Mod: ,,, | Performed by: PSYCHIATRY & NEUROLOGY

## 2023-07-31 PROCEDURE — 85730 THROMBOPLASTIN TIME PARTIAL: CPT

## 2023-07-31 PROCEDURE — 99900035 HC TECH TIME PER 15 MIN (STAT)

## 2023-07-31 PROCEDURE — 99232 SBSQ HOSP IP/OBS MODERATE 35: CPT | Mod: ,,, | Performed by: PSYCHIATRY & NEUROLOGY

## 2023-07-31 PROCEDURE — 92526 ORAL FUNCTION THERAPY: CPT

## 2023-07-31 PROCEDURE — 99233 PR SUBSEQUENT HOSPITAL CARE,LEVL III: ICD-10-PCS | Mod: ,,, | Performed by: HOSPITALIST

## 2023-07-31 PROCEDURE — 83735 ASSAY OF MAGNESIUM: CPT

## 2023-07-31 PROCEDURE — 94640 AIRWAY INHALATION TREATMENT: CPT

## 2023-07-31 PROCEDURE — 36415 COLL VENOUS BLD VENIPUNCTURE: CPT

## 2023-07-31 PROCEDURE — 97535 SELF CARE MNGMENT TRAINING: CPT

## 2023-07-31 RX ORDER — CIPROFLOXACIN 500 MG/1
500 TABLET ORAL EVERY 12 HOURS
Status: DISCONTINUED | OUTPATIENT
Start: 2023-07-31 | End: 2023-08-04 | Stop reason: HOSPADM

## 2023-07-31 RX ORDER — SODIUM BICARBONATE 650 MG/1
650 TABLET ORAL 2 TIMES DAILY
Status: COMPLETED | OUTPATIENT
Start: 2023-07-31 | End: 2023-08-02

## 2023-07-31 RX ADMIN — LITHIUM CARBONATE 300 MG: 300 TABLET, FILM COATED, EXTENDED RELEASE ORAL at 09:07

## 2023-07-31 RX ADMIN — SODIUM BICARBONATE 650 MG TABLET 650 MG: at 09:07

## 2023-07-31 RX ADMIN — IPRATROPIUM BROMIDE AND ALBUTEROL SULFATE 3 ML: 2.5; .5 SOLUTION RESPIRATORY (INHALATION) at 08:07

## 2023-07-31 RX ADMIN — RIFAXIMIN 550 MG: 550 TABLET ORAL at 09:07

## 2023-07-31 RX ADMIN — LACTULOSE 30 G: 20 SOLUTION ORAL at 04:07

## 2023-07-31 RX ADMIN — PANTOPRAZOLE SODIUM 40 MG: 40 GRANULE, DELAYED RELEASE ORAL at 09:07

## 2023-07-31 RX ADMIN — LEVETIRACETAM 1000 MG: 500 SOLUTION ORAL at 09:07

## 2023-07-31 RX ADMIN — OLANZAPINE 5 MG: 5 TABLET, FILM COATED ORAL at 09:07

## 2023-07-31 RX ADMIN — CIPROFLOXACIN HYDROCHLORIDE 500 MG: 500 TABLET, FILM COATED ORAL at 11:07

## 2023-07-31 RX ADMIN — CIPROFLOXACIN HYDROCHLORIDE 500 MG: 500 TABLET, FILM COATED ORAL at 09:07

## 2023-07-31 RX ADMIN — LACTULOSE 30 G: 20 SOLUTION ORAL at 09:07

## 2023-07-31 RX ADMIN — SODIUM BICARBONATE 650 MG TABLET 650 MG: at 11:07

## 2023-07-31 RX ADMIN — INSULIN ASPART 2 UNITS: 100 INJECTION, SOLUTION INTRAVENOUS; SUBCUTANEOUS at 05:07

## 2023-07-31 NOTE — PROGRESS NOTES
"CONSULTATION LIAISON PSYCHIATRY PROGRESS NOTE    Patient Name: Marely Hamilton  MRN: [id number]  Patient Class: IP- Inpatient  Admission Date: 7/5/2023  Attending Physician: Seth Noyola MD      SUBJECTIVE:   Marely Hamilton is a 58 y.o. female with past psychiatric history of bipolar disorder, alcohol use disorder & past pertinent medical history of seizure disorder, alcohol induced hepatic cirrhosis presents to the ED/admitted to the hospital for Gastrointestinal hemorrhage associated with duodenal ulcer. Patient presented from SNF (when she experienced alida blood per rectum per chart review), for which she was recently discharged to when she presented to the hospital on for hepatic encephalopathy c/b retroperitoneal bleed (s/p ICV filter and IR embolization).     Psychiatry consulted for  medication management    Today, patient was awake, alert and orietnted to self, place "hospital", and year. When asked how she was doing, she stated that the "earth unlocked me and did not put me back together." Patient was talkative, mumbling to self, counting and also using profanity at times. Patient unable to engage in meaningful conversation during this assessment.     Interval Events:   7/31: UC with klebsialla penumoniae and Aerogenes resisant to ceftriaxone. Started on ciprofloxacin      OBJECTIVE:    Mental Status Exam:  General Appearance: dressed in hospital garb, appears older than stated age, thin and gaunt  Behavior: cooperative, friendly  Involuntary Movements and Motor Activity: no tremors  Gait and Station: unable to assess - patient lying down or seated  Speech and Language: fluent English, soft, monotone, talkative  Mood: Unable to assess  Affect: calm  Thought Process and Associations: bizarre, tangential, +loosening of associations  Thought Content and Perceptions:: + delusions  Sensorium and Orientation: oriented to person and place and year  Recent and Remote Memory:  impaired due to mental " "status  Attention and Concentration: attentive to conversation, easily distractible  Fund of Knowledge: Unable to Formally Assess  Insight: impaired due to patient's medical condition  Judgment: impaired due to patient' medical condition    CAM ICU positive? no  CAM-ICU:  Acute change and/or fluctuating course of mental status: Yes  Inattention (SAVEAHAART): Yes  "Squeeze my hand, only when you hear, the letter 'A'."  Altered Level of Consciousness: No  Disorganized Thinking (Errors >1/6): No  "Will a stone float on water?"  "Are there fish in the sea?"  "Does one pound weigh more than two?"  "Can you use a hammer to pound a nail?"  Command(s):  "Hold up 2 fingers."  "Now do the same thing with the other hand."    Score: 1+2 AND, either 3 or 4 present = Positive CAM-ICU       ASSESSMENT & RECOMMENDATIONS   Bipolar Disorder  R/o delirium, likely due to UTI in setting of +UA, on antibiotics now        PSYCH MEDICATIONS  Scheduled: Recommend continue lithium 300 mg nightly and Continue zyprexa 5 mg nightly  Does not appear to be requiring PRNs at this time, in future if patient does become agitated recommend Zyprexa 5 mg PO/IM.   Monitor QTc if patient requires PRN zyprexa.     DELIRIUM PRECAUTION BEHAVIOR MANAGEMENT  PLEASE utilize CHEMICAL restraints with PRN meds first for agitation. Minimize use of PHYSICAL restraints OR have periods of being out of physical restraints if possible.  Keep window shades open and room lit during day and room dim at night in order to promote normal sleep-wake cycles  Encourage family at bedside. Alma patient often to situation, location, date.  Continue to Limit or Discontinue use of Narcotics, Benzos and Anti-cholinergic medications as they may worsen delirium.  Continue medical workup for causative etiology of Delirium.      RISK ASSESSMENT  NO NEED FOR PEC patient NOT in any imminent danger of hurting self or others and not gravely disabled.      FOLLOW UP  Will follow up while in " house     DISPOSITION - once medically cleared:   Defer to medical team    Please contact ON CALL psychiatry service (24/7) for any acute issues that may arise.    Dr. Mehdi Aguilera   Psychiatry  [facility]  7/31/2023 11:15 AM        --------------------------------------------------------------------------------------------------------------------------------------------------------------------------------------------------------------------------------------    CONTINUED OBJECTIVE clinical data & findings reviewed and noted for above decision making    Current Medications:   Scheduled Meds:    albuterol-ipratropium  3 mL Nebulization Q6H WAKE    ciprofloxacin HCl  500 mg Oral Q12H    lactulose  30 g Oral TID    levetiracetam  1,000 mg Oral BID    lithium  300 mg Oral QHS    OLANZapine  5 mg Oral QHS    pantoprazole  40 mg Oral BID    rifAXImin  550 mg Oral BID    sodium bicarbonate  650 mg Oral BID     PRN Meds: dextrose 10%, dextrose 10%, glucagon (human recombinant), insulin aspart U-100, oxyCODONE, sodium chloride 0.9%    Allergies:   Review of patient's allergies indicates:   Allergen Reactions    Sulfa (sulfonamide antibiotics) Rash    Codeine Nausea And Vomiting       Vitals  Vitals:    07/31/23 0812   BP:    Pulse: 77   Resp: 20   Temp:        Labs/Imaging/Studies:  Recent Results (from the past 24 hour(s))   POCT glucose    Collection Time: 07/30/23 11:48 AM   Result Value Ref Range    POCT Glucose 136 (H) 70 - 110 mg/dL   Calcium, ionized    Collection Time: 07/30/23  3:52 PM   Result Value Ref Range    Ionized Calcium 1.22 1.06 - 1.42 mmol/L   POCT glucose    Collection Time: 07/30/23  3:57 PM   Result Value Ref Range    POCT Glucose 105 70 - 110 mg/dL   POCT glucose    Collection Time: 07/30/23 11:46 PM   Result Value Ref Range    POCT Glucose 134 (H) 70 - 110 mg/dL   APTT    Collection Time: 07/31/23  4:44 AM   Result Value Ref Range    aPTT 39.3 (H) 21.0 - 32.0 sec   Calcium,  ionized    Collection Time: 07/31/23  4:44 AM   Result Value Ref Range    Ionized Calcium 1.12 1.06 - 1.42 mmol/L   CBC Auto Differential    Collection Time: 07/31/23  4:44 AM   Result Value Ref Range    WBC 2.18 (L) 3.90 - 12.70 K/uL    RBC 2.71 (L) 4.00 - 5.40 M/uL    Hemoglobin 8.8 (L) 12.0 - 16.0 g/dL    Hematocrit 29.0 (L) 37.0 - 48.5 %     (H) 82 - 98 fL    MCH 32.5 (H) 27.0 - 31.0 pg    MCHC 30.3 (L) 32.0 - 36.0 g/dL    RDW SEE COMMENT 11.5 - 14.5 %    Platelets 58 (L) 150 - 450 K/uL    MPV 10.7 9.2 - 12.9 fL    Immature Granulocytes 0.0 0.0 - 0.5 %    Gran # (ANC) 1.2 (L) 1.8 - 7.7 K/uL    Immature Grans (Abs) 0.00 0.00 - 0.04 K/uL    Lymph # 0.6 (L) 1.0 - 4.8 K/uL    Mono # 0.2 (L) 0.3 - 1.0 K/uL    Eos # 0.2 0.0 - 0.5 K/uL    Baso # 0.01 0.00 - 0.20 K/uL    nRBC 0 0 /100 WBC    Gran % 56.4 38.0 - 73.0 %    Lymph % 26.6 18.0 - 48.0 %    Mono % 9.2 4.0 - 15.0 %    Eosinophil % 7.3 0.0 - 8.0 %    Basophil % 0.5 0.0 - 1.9 %    Differential Method Automated    Comprehensive Metabolic Panel    Collection Time: 07/31/23  4:44 AM   Result Value Ref Range    Sodium 138 136 - 145 mmol/L    Potassium 3.6 3.5 - 5.1 mmol/L    Chloride 116 (H) 95 - 110 mmol/L    CO2 16 (L) 23 - 29 mmol/L    Glucose 84 70 - 110 mg/dL    BUN 3 (L) 6 - 20 mg/dL    Creatinine 0.4 (L) 0.5 - 1.4 mg/dL    Calcium 7.9 (L) 8.7 - 10.5 mg/dL    Total Protein 4.8 (L) 6.0 - 8.4 g/dL    Albumin 1.9 (L) 3.5 - 5.2 g/dL    Total Bilirubin 3.2 (H) 0.1 - 1.0 mg/dL    Alkaline Phosphatase 102 55 - 135 U/L    AST 32 10 - 40 U/L    ALT 13 10 - 44 U/L    eGFR >60.0 >60 mL/min/1.73 m^2    Anion Gap 6 (L) 8 - 16 mmol/L   Magnesium    Collection Time: 07/31/23  4:44 AM   Result Value Ref Range    Magnesium 1.6 1.6 - 2.6 mg/dL   POCT glucose    Collection Time: 07/31/23  6:14 AM   Result Value Ref Range    POCT Glucose 89 70 - 110 mg/dL     Imaging Results              CTA Acute GI Raub, Abdomen and Pelvis (Final result)  Result time 07/05/23 17:15:19       Final result by Mumtaz Garsia MD (07/05/23 17:15:19)                   Impression:      Images are degraded by significant streak and motion artifacts.    Stable large left retroperitoneal hematoma and left iliacus hematoma.  No contrast extravasation within the hematomas or bowel to indicate active arterial bleed.    Hepatic cirrhosis.  Diffuse wall thickening of the stomach and colon, which can be seen in the setting of hepatic dysfunction.  However, superimposed inflammatory processes are not excluded.    Multiple, nondilated, distended fluid-filled loops of small bowel without a definite transition point.  Stool and air seen within the colon, this is suggestive of ileus.    Small left pleural effusion with associated compressive atelectasis.    Cholelithiasis.    Cardiomegaly.    Electronically signed by resident: Emiliano Mcmahon  Date:    07/05/2023  Time:    16:29    Electronically signed by: Mumtaz Garsia MD  Date:    07/05/2023  Time:    17:15               Narrative:    EXAMINATION:  CTA ACUTE GI BLEED, ABDOMEN AND PELVIS    CLINICAL HISTORY:  GI bleed;    TECHNIQUE:  Initial noncontrast  images were obtained of the abdomen and pelvis.  CT axial angiography images were then obtained from the lung bases to the pubic symphysis following the intravenous administration of 100 of Omnipaque 350 with delayed images obtained per GI bleeding protocol.  Sagittal and coronal reformats were provided.    COMPARISON:  CTA GI bleed 06/13/2023    FINDINGS:  Images are degraded by significant streak and motion artifacts.    Lungs: Interval decrease in size of the small right pleural effusion with associated compressive atelectasis.  Resolution of the left pleural effusion.    Heart: Enlarged.  No pericardial effusion.    Liver: Nodular contour of the liver.  No focal abnormality.    Gallbladder: Multiple calcified gallstones.  No pericholecystic inflammatory changes.    Bile ducts: No intrahepatic or  extrahepatic biliary ductal dilatation.    Spleen: Normal size.    Pancreas: No mass. No ductal dilatation. No peripancreatic fat stranding.    Adrenals: No significant abnormality    Renal/Ureters: Bilateral cortical thinning.  No hydronephrosis.  Proximal ureters are normal caliber.  The distal ureters are difficult to evaluate due to streak artifact.  Bladder is decompressed with Saldaña catheter in place.    Reproductive: Uterus appears without significant abnormality.  No adnexal mass, noting limited evaluation due to significant streak artifact.    Stomach/Bowel: Stomach is distended with ingested contents with thickened rugal folds.  Small bowel is distended with multiple nondilated fluid-filled loops.  No transition point.  Appendix is normal.  Diffuse wall thickening throughout the colon with mild stool burden.  Diffuse wall thickening of the rectum.  No contrast extravasation to indicate active arterial bleed.    Peritoneum: Stable large left retroperitoneal hematoma measuring 11.5 x 7.8 x 12.6 cm.  No contrast extravasation to indicate active bleed within the hematoma.  Additional smaller hematoma anterior to the left iliacus muscle, which appears stable compared to prior CTA.  No free fluid. No intraperitoneal free air.    Lymph Nodes: Multiple prominent mesenteric and retroperitoneal lymph nodes.    Vasculature: Abdominal aorta tapers normally.  Mild atherosclerosis of the abdominal aorta and its branches.    Bones: Bony mineralization is diminished.  Left hip arthroplasty.  Generalized body wall edema.    Soft Tissues: No significant abnormality.                                       CT Head Without Contrast (Final result)  Result time 07/05/23 15:10:53      Final result by Viktor Desouza MD (07/05/23 15:10:53)                   Impression:      No evidence of acute intracranial pathology.    Volume loss again identified.    Electronically signed by resident: Kenney  Alison  Date:    07/05/2023  Time:    14:45    Electronically signed by: Viktor Desouza  Date:    07/05/2023  Time:    15:10               Narrative:    EXAMINATION:  CT HEAD WITHOUT CONTRAST    CLINICAL HISTORY:  Mental status change, unknown cause;    TECHNIQUE:  Low dose axial CT images obtained throughout the head without the use of intravenous contrast.  Axial, sagittal and coronal reconstructions were performed.    COMPARISON:  CT head 06/22/2023    FINDINGS:  Intracranial compartment:    Volume loss without evidence of hydrocephalus.    No parenchymal  hemorrhage, edema, mass effect or major vascular distribution infarct.    Decreased attenuation in the periventricular white matter is nonspecific but may reflect mild chronic small vessel ischemic change vs other encephalopathy.    No extra-axial blood or fluid collections.    Skull/extracranial contents (limited evaluation):    No displaced calvarial fracture.    The visualized sinuses and mastoid air cells are clear.                                       X-Ray Chest AP Portable (Final result)  Result time 07/05/23 13:09:11      Final result by Americo Reyes MD (07/05/23 13:09:11)                   Impression:      No significant detrimental interval change in the appearance of the chest since 06/25/2023 is appreciated, with significant improvement as noted above.  No post procedure pneumothorax.      Electronically signed by: Americo Reyes MD  Date:    07/05/2023  Time:    13:09               Narrative:    EXAMINATION:  XR CHEST AP PORTABLE    TECHNIQUE:  One view was obtained.  Note is made of the fact that the thorax is obscured to some extent by opacities external to the patient, particularly defibrillator pads.    COMPARISON:  Comparison is made to the most recent prior chest radiograph of 06/25/2023.    FINDINGS:  Endotracheal tube has been placed, its tip lying just superior to the apex of the aortic arch, well above the katharina.  Left jugular origin  vascular catheter is now seen, its tip in the superior vena cava near the junction of the right and left brachiocephalic veins.  Allowing for magnification of the cardiomediastinal silhouette related to projection, the heart is not significantly enlarged.  Opacity in both inferior hemithoraces seen on the previous examination has resolved, consistent with clearing of airspace consolidation in both mid/lower lung zones and resolution of previously present bilateral pleural fluid.  Lung zones are currently clear and free of significant airspace consolidation or volume loss.  No pleural fluid of any substantial volume is seen on either side.  No pneumothorax.

## 2023-07-31 NOTE — PLAN OF CARE
Problem: SLP  Goal: SLP Goal  Description: Speech Pathology Goals  To be met by 8/11/23  1. Pt will exhibit a functional swallow for tolerance of a mechanical soft diet with thin liquids in order to maintain adequate hydration and nutrition  2. Pt will participate in ongoing diagnostic dysphagia therapy    Speech Pathology Goals  To be met by 7/27/23  1. Pt will participate in ongoing diagnostic dysphagia therapy    Outcome: Met  Patient tolerating mechanical soft diet and thin liquids with aspiration precautions in place. Patient reports continued preference to remain on a soft diet at this time. No further skilled acute Speech Therapy services warranted at this time. Please re-consult as needed.

## 2023-07-31 NOTE — PT/OT/SLP PROGRESS
Speech Language Pathology Treatment  Discharge    Patient Name:  Marely Hamilton   MRN:  [id number]  [id number]    Admitting Diagnosis: Gastrointestinal hemorrhage associated with duodenal ulcer    Recommendations:                 General Recommendations:  Follow-up not indicated  Diet recommendations:  Mechanical soft, Liquid Diet Level: Thin   Aspiration Precautions: 1 bite/sip at a time, Assistance with meals, Check for pocketing/oral residue, Eliminate distractions, Feed only when awake/alert, Frequent oral care, HOB to 90 degrees, Meds whole 1 at a time, Monitor for s/s of aspiration, and Standard aspiration precautions   General Precautions: Standard, fall, aspiration  Communication strategies:  none    Assessment:     Marely Hamilton is a 58 y.o. female with adequate tolerance of mechanical soft solids and thin liquids with aspiration precautions in place. No further skilled acute Speech Therapy services warranted at this time. Please re-consult as needed.       Subjective     Patient awake and cooperative. RN present in room administering medications. Breakfast tray in room.   Patient goals: none stated      Respiratory Status: nasal cannula    Objective:     Has the patient been evaluated by SLP for swallowing?   Yes  Keep patient NPO? No     Patient awake and cooperative. Tangential, nonsensical speech noted throughout session. Confusion evident. Patient assessed with whole medication (broken in half 2/2 large size), liquid medications, cream of wheat, scrambled eggs, chopped sausage, and bites of saltine cracker. She presented with an immediate cough following initial sip of juice only. No overt s/s of aspiration with all further trials. Patient with difficulty swallowing medications buried in puree. No difficulty noted with medications with liquid wash, puree, soft solids, or thin liquids via cup sips. She reports continued preference to remain on soft solids and declined diet upgrade trial. SLP reviewed  SLP recommendations, SLP role, s/s and risks of aspiration, safe swallow precautions, and POC. All questions addressed and patient verbalized understanding of all discussed.     Goals:   Multidisciplinary Problems       SLP Goals       Not on file              Multidisciplinary Problems (Resolved)          Problem: SLP    Goal Priority Disciplines Outcome   SLP Goal   (Resolved)     SLP Met   Description: Speech Pathology Goals  To be met by 8/11/23  1. Pt will exhibit a functional swallow for tolerance of a mechanical soft diet with thin liquids in order to maintain adequate hydration and nutrition  2. Pt will participate in ongoing diagnostic dysphagia therapy        Speech Pathology Goals  To be met by 7/27/23  1. Pt will participate in ongoing diagnostic dysphagia therapy                         Plan:       Plan of Care reviewed with:  patient   SLP Follow-Up:  No       Discharge recommendations:  nursing facility, skilled   Barriers to Discharge:  None    Time Tracking:     SLP Treatment Date:   07/31/23  Speech Start Time:  0910  Speech Stop Time:  0933     Speech Total Time (min):  23 min    Billable Minutes: Treatment Swallowing Dysfunction 13 and Self Care/Home Management Training 10    07/31/2023

## 2023-07-31 NOTE — PLAN OF CARE
Problem: Infection  Goal: Absence of Infection Signs and Symptoms  Outcome: Ongoing, Progressing     Problem: Adult Inpatient Plan of Care  Goal: Plan of Care Review  Outcome: Ongoing, Progressing  Goal: Patient-Specific Goal (Individualized)  Outcome: Ongoing, Progressing  Goal: Absence of Hospital-Acquired Illness or Injury  Outcome: Ongoing, Progressing  Goal: Optimal Comfort and Wellbeing  Outcome: Ongoing, Progressing   Pt rested very little. VS WNL. No ss of pain or distress. Call light within reach. Bed in low position. Bed alarm on. Will continue to monitor and pass on care to oncoming nurse.

## 2023-07-31 NOTE — PROGRESS NOTES
Jimmy Phillip - Telemetry [facility] Medicine  Progress Note    Patient Name: Marely Hamilton  MRN: [id number]  Patient Class: IP- Inpatient   Admission Date: 7/5/2023  Length of Stay: 26 days  Attending Physician: Seth Noyola MD  Primary Care Provider: Viktor Ross MD        Subjective:     Principal Problem:Gastrointestinal hemorrhage associated with duodenal ulcer        HPI:  Marely Hamilton is a 58 y.o. female with history of alcoholic cirrhosis, seizure disorder, DVT and IJ thrombus with recent admission for large retroperitoneal hemorrhage requiring IR embolization and IVC filter placement who presents for hematochezia. Onset this morning at [facility], alida blood per rectum per chart, sent to ED. Initially normotensive but then severe hypotension prompted initiation of levophed and intubation. Hgb 6.8, 2u pRBC given + 1u FFP ordered. Hgb 8.5 on 7/2. On levo and vaso currently. K 6.6 but renal function at baseline. Repeat pending. No prior EGDs on record.          Overview/Hospital Course:    Patient was seen at bedside, hematochezia still present post op by IR. Patient has required many transfusions of pRBCs and platelets due to ongoing down trending of Hgb. No clear source of ongoing hemorrhage by imaging. Patient has continued to require pressors to maintain MAP >65. Discussed with GI and IR regarding active bleeding post op, IR indicated there is not much else to do from their end bc they embolized a significant part of GDA. Pt is off of pressor requirement and not actively bleeding. GI re evaluated pt via EGD and found no sources of active bleeding. Pt is extubated and currently on BIPAP requirement due to de saturation in the 80's. Pt is to be seen by speech and PT/OT. Clear mucinous secretions via suction, chest physiotherapy, and cough assist device. Nebs to help with breathing as well. Pt is off BIPAP and currently on comfort flow. Triple therapy (amoxicillin, clarithromycin) started for 14 days  to treat for positive H pylori serology. Pt also had a NG tube placed so we may begin tube feedings. CxR, EKG, and ABG displayed no reason for tachycardia. Pt becomes anxious when seen by different provider teams. Remove art line and consult for midline placement. Continue to wean comfort flow as tolerated. Ensure pt is working with PT/OT/Speech for continuous improvement. Consult psych regarding patient history of bipolar disorder.         Interval History/Significant Events: Wean comfort flow as tolerated. Ensure patient is working with PT/OT/Speech for continuous improvement. Consult psych regarding psych history     7/16   history of alcoholic cirrhosis, seizure disorder, DVT and IJ thrombus with recent admission for large retroperitoneal hemorrhage requiring IR embolization and IVC filter placement who presents for hematochezia. s/p GI and IR in which they clipped (GI) and embolized (IR) possible sources of duodenal ulcer bleeding.  Hb stable  s/p extubation. sats 92% on 5L of NC.  CX ray - Detrimental changes since the previous examination including interval increase in pulmonary vascularity and bilateral diffuse interstitial pulmonary parenchymal opacification, progression of abnormal opacification at the left lung base, and development of small right-sided pleural effusion.   findings would be consistent with congestive heart failure.PT/OT recs SNF. BNP , procalcitonin and Echo.  SLP recs NPO .  On NGT feeds. psychiatry following for bipolar disorders.  Recs Zyprexa 5 mg QHS . Ammonia 48 WNL. AAOX 1. Hepatology consulted for cirrhosis. UA ordered   7/17 Saldaña removed. UA +. UC pending. started on IV ceftriaxone. ammonia at 59. on B/L UE mittens as pulled of NGT. Hepatology eval. resumed lactulose . discussed with sister and updated clinical status   7/18  MBS today - started puree nectar thick liquids.  on oxygen 3L via NC.   wean  as tolerated . UC -YEAST 10,000 - 49,999 cfu/ml Identification pending No  other significant isolate. hydroxyzine  PRN discontinued  due to it not being safe in delirium. oriented to person, hospital and year   7/19 SLP recs - tolerating Puree Diet - IDDSI Level 4, Mildly thick/Nectar thick liquids - IDDSI Level 2 .stool output 600ml/ monitor on lactulose . improved mentation AAOX 3  7/26 Await NH placement. Little improvement in functional status. Tunneled fistula seen near rectum. Low grade temp.  7/26 K replaced. Await NH placement. Little improvement in functional status. Tunneled fistula seen near rectum. Low grade temp. Consulted psych for recs concerning discharge.   7/27 Sister wanted her started back on lithium but psychiatry recommended  increasing Zyprexa 7.5mg QHS. EKG to monitor QtcDiscontinue zyprexa if Qtc >480. EKG QTC 493ms.  Zyprexa resumed at  zyprexa 5 nightly per psychiatry . Urinary retention this AM s/p straight cath   7/28 continues with confusion. UA + . UC pending. continue ceftriaxone. no fistula found on scaral exam with wound care.  Repeat EKG today to monitor Qtc. Saldaña catheter placed for urinary retention .  started  lithium 300 mg  nightly due to concern for manic episode  7/29 SLP recs - Mechanical soft, Thin . UC - GRAM NEGATIVE BILLY 10,000 - 49,999 cfu/ml  Identification and susceptibility pending  .ammonia elevated at 70. lactulose increased to 30g TID .started rifaximin BID   7/30 BMX 1 documented . pending mental status improvement   7/31 UC with klebsialla penumoniae and Aerogenes resisant to ceftriaxone. started on ciprofloxacin . sodium bicarbonate for bicarb of 16           Review of Systems:   Pain scale:   Constitutional:  fever,  chills, headache, vision loss, hearing loss, weight loss, Generalized weakness, falls, loss of smell, loss of taste, poor appetite,  sore throat  Respiratory: cough, shortness of breath.   Cardiovascular: chest pain, dizziness, palpitations, orthopnea, swelling of feet, syncope  Gastrointestinal: nausea, vomiting,  abdominal pain, diarrhea, black stool,  blood in stool, change in bowel habits  Genitourinary: hematuria, dysuria, urgency, frequency  Integument/Breast: rash,  pruritis  Hematologic/Lymphatic: easy bruising, lymphadenopathy  Musculoskeletal: arthralgias , myalgias, back pain, neck pain, knee pain  Neurological: confusion, seizures, tremors, slurred speech  Behavioral/Psych:  depression, anxiety, auditory or visual hallucinations     OBJECTIVE:     Physical Exam:  Body mass index is 24.69 kg/m².    Constitutional: Appears well-developed and well-nourished.   Head: Normocephalic and atraumatic. + icterus  Neck: Normal range of motion. Neck supple.   Cardiovascular: Normal heart rate.  Regular heart rhythm.  Pulmonary/Chest: Effort normal.   Abdominal: No distension.  No tenderness.  baugh catheter   Musculoskeletal: Normal range of motion.   Neurological: Alert and  oriented to person, hospital follows simple commands .not oriented to situation  Skin: Skin is warm and dry. ehcymoses on the upper chest    Psychiatric: Normal mood and affect. Behavior is normal.                  Vital Signs  Temp: 98 °F (36.7 °C) (07/31/23 0805)  Pulse: 77 (07/31/23 0812)  Resp: 20 (07/31/23 0812)  BP: (Abnormal) 98/50 (07/31/23 0805)  SpO2: 96 % (07/31/23 0812)     24 Hour VS Range    Temp:  [97.9 °F (36.6 °C)-98.5 °F (36.9 °C)]   Pulse:  [74-92]   Resp:  [14-20]   BP: ()/(50-65)   SpO2:  [92 %-99 %]     Intake/Output Summary (Last 24 hours) at 7/31/2023 1329  Last data filed at 7/31/2023 0701  Gross per 24 hour   Intake 600 ml   Output 650 ml   Net -50 ml         I/O This Shift:  I/O this shift:  In: -   Out: 650 [Urine:650]    Wt Readings from Last 3 Encounters:   07/28/23 61.2 kg (135 lb)   07/03/23 44.7 kg (98 lb 8.7 oz)   06/29/23 59.9 kg (132 lb 0.9 oz)       I have personally reviewed the vitals and recorded Intake/Output     Laboratory/Diagnostic Data:    CBC/Anemia Labs: Coags:    Recent Labs   Lab 07/29/23  0345  "07/30/23  0557 07/31/23  0444   WBC 2.69* 2.95* 2.18*   HGB 8.4* 8.2* 8.8*   HCT 26.5* 25.8* 29.0*   PLT 61* 55* 58*   * 105* 107*   RDW SEE COMMENT SEE COMMENT SEE COMMENT    Recent Labs   Lab 07/29/23  0346 07/30/23  0557 07/31/23  0444   APTT 34.7* 38.5* 39.3*        Chemistries: ABG:   Recent Labs   Lab 07/29/23  0346 07/30/23  0557 07/31/23  0444    141 138   K 3.8 3.6 3.6   * 118* 116*   CO2 20* 18* 16*   BUN 4* 3* 3*   CREATININE 0.4* 0.4* 0.4*   CALCIUM 7.9* 7.7* 7.9*   PROT 4.9* 4.7* 4.8*   BILITOT 3.5* 3.1* 3.2*   ALKPHOS 102 95 102   ALT 13 10 13   AST 31 29 32   MG 1.6 1.6 1.6    No results for input(s): "PH", "PCO2", "PO2", "HCO3", "POCSATURATED", "BE" in the last 168 hours.       POCT Glucose: HbA1c:    Recent Labs   Lab 07/30/23  0541 07/30/23  0759 07/30/23  1148 07/30/23  1557 07/30/23  2346 07/31/23  0614   POCTGLUCOSE 111* 95 136* 105 134* 89    Hemoglobin A1C   Date Value Ref Range Status   05/29/2023 <4.0 (A) 4.0 - 5.6 % Final     Comment:     ADA Screening Guidelines:  5.7-6.4%  Consistent with prediabetes  >or=6.5%  Consistent with diabetes    High levels of fetal hemoglobin interfere with the HbA1C  assay. Heterozygous hemoglobin variants (HbS, HgC, etc)do  not significantly interfere with this assay.   However, presence of multiple variants may affect accuracy.  Falsely low HA1c results may be observed in patients   with clinical conditions that shorten erythrocyte   life span or decrease mean erythrocyte age.  HA1c   may not accurately reflect glycemic control when   clinical conditions that affect erythrocyte survival   are present. Fructosamine may be used as an alternate  measurement of glycemic control.     01/22/2021 4.1 4.0 - 5.6 % Final     Comment:     ADA Screening Guidelines:  5.7-6.4%  Consistent with prediabetes  >or=6.5%  Consistent with diabetes  High levels of fetal hemoglobin interfere with the HbA1C  assay. Heterozygous hemoglobin variants (HbS, HgC, " "etc)do  not significantly interfere with this assay.   However, presence of multiple variants may affect accuracy.     12/10/2020 4.6 4.0 - 5.6 % Final     Comment:     ADA Screening Guidelines:  5.7-6.4%  Consistent with prediabetes  >or=6.5%  Consistent with diabetes  High levels of fetal hemoglobin interfere with the HbA1C  assay. Heterozygous hemoglobin variants (HbS, HgC, etc)do  not significantly interfere with this assay.   However, presence of multiple variants may affect accuracy.          Cardiac Enzymes: Ejection Fractions:    No results for input(s): "CPK", "CPKMB", "MB", "TROPONINI" in the last 72 hours. EF   Date Value Ref Range Status   07/16/2023 65 % Final   06/03/2023 70 % Final          No results for input(s): "COLORU", "APPEARANCEUA", "PHUR", "SPECGRAV", "PROTEINUA", "GLUCUA", "KETONESU", "BILIRUBINUA", "OCCULTUA", "NITRITE", "UROBILINOGEN", "LEUKOCYTESUR", "RBCUA", "WBCUA", "BACTERIA", "SQUAMEPITHEL", "HYALINECASTS" in the last 48 hours.    Invalid input(s): "WRIGHTSUR"        Procalcitonin (ng/mL)   Date Value   06/16/2023 0.26 (H)     Lactate (Lactic Acid) (mmol/L)   Date Value   07/16/2023 1.4   07/16/2023 1.4   07/06/2023 1.5   07/05/2023 8.0 (HH)   06/11/2023 6.2 (HH)     BNP (pg/mL)   Date Value   07/16/2023 174 (H)     No results found for: "CRP", "SEDRATE"  D-Dimer (mg/L FEU)   Date Value   07/07/2023 2.22 (H)     Ferritin (ng/mL)   Date Value   05/18/2023 110   10/23/2015 412 (H)   09/19/2015 599 (H)   09/19/2015 599 (H)   07/23/2015 551 (H)   06/17/2015 612 (H)     LD (U/L)   Date Value   06/05/2023 249   05/31/2023 426 (H)   09/19/2015 280 (H)     Troponin I (ng/mL)   Date Value   06/13/2023 0.029 (H)   06/13/2023 0.030 (H)   05/18/2023 0.035 (H)   05/18/2023 0.037 (H)   05/05/2023 <0.006   01/25/2023 <0.006     CPK (U/L)   Date Value   05/05/2023 47   01/25/2023 27   06/14/2020 23 (L)     CK (U/L)   Date Value   04/15/2023 98     Results for orders placed or performed during the " hospital encounter of 05/18/23   Vitamin D   Result Value Ref Range    Vit D, 25-Hydroxy 15 (L) 30 - 96 ng/mL     SARS-CoV2 (COVID-19) Qualitative PCR (no units)   Date Value   07/26/2023 Not Detected   06/30/2023 Not Detected   02/14/2023 Not Detected     SARS-CoV-2 RNA, Amplification, Qual (no units)   Date Value   05/28/2023 Negative   11/02/2021 Negative   01/21/2021 Negative   01/11/2021 Negative   11/21/2020 Negative   06/16/2020 Negative       Microbiology labs for the last week  Microbiology Results (last 7 days)       Procedure Component Value Units Date/Time    Urine culture [[id number]]  (Abnormal)  (Susceptibility) Collected: 07/28/23 0541    Order Status: Completed Specimen: Urine Updated: 07/30/23 1313     Urine Culture, Routine KLEBSIELLA PNEUMONIAE  10,000 - 49,999 cfu/ml        KLEBSIELLA AEROGENES  10,000 - 49,999 cfu/ml  Susceptibility pending      Narrative:      Specimen Source->Urine            Reviewed and noted in plan where applicable- Please see chart for full lab data.    Lines/Drains:       Peripheral IV - Single Lumen 07/12/23 1148 20 G;1 3/4 in Left Forearm (Active)   Site Assessment Clean;Dry;Intact 07/16/23 0701   Extremity Assessment Distal to IV No abnormal discoloration 07/16/23 0701   Line Status No blood return;Flushed;Saline locked 07/16/23 0701   Dressing Status Dry;Clean;Intact 07/16/23 0701   Dressing Intervention Integrity maintained 07/16/23 0701   Dressing Change Due 07/16/23 07/16/23 0701   Site Change Due 07/16/23 07/16/23 0701   Reason Not Rotated Not due 07/16/23 0701   Number of days: 3            Peripheral IV - Single Lumen 07/14/23 1540 20 G;1 3/4 in Right Upper Arm (Active)   Site Assessment Intact;Clean;Dry 07/16/23 0701   Extremity Assessment Distal to IV No abnormal discoloration 07/16/23 0701   Line Status Blood return noted;Flushed;Saline locked 07/16/23 0701   Dressing Status Dry;Intact;Clean 07/16/23 0701   Dressing Intervention Integrity maintained 07/16/23  "0701   Dressing Change Due 07/18/23 07/16/23 0701   Site Change Due 07/18/23 07/16/23 0701   Reason Not Rotated Not due 07/16/23 0701   Number of days: 1            NG/OG Tube 07/13/23 1311 [place] sump 18 Fr. Right nostril (Active)   Placement Check placement verified by aspirate characteristics 07/16/23 0701   Tolerance no signs/symptoms of discomfort 07/16/23 0701   Securement secured to nostril center w/ adhesive device 07/16/23 0701   Clamp Status/Tolerance unclamped 07/16/23 0701   Suction Setting/Drainage Method suction at the bedside 07/16/23 0701   Insertion Site Appearance no redness, warmth, tenderness, skin breakdown, drainage 07/16/23 0701   Flush/Irrigation flushed w/;water 07/16/23 0502   Feeding Type continuous;by pump 07/16/23 0701   Feeding Action feeding continued 07/16/23 0701   Current Rate (mL/hr) 50 mL/hr 07/16/23 0701   Goal Rate (mL/hr) 50 mL/hr 07/16/23 0701   Intake (mL) 20 mL 07/15/23 0800   Water Bolus (mL) 250 mL 07/16/23 0501   Rate Formula Tube Feeding (mL/hr) 20 mL/hr 07/14/23 0400   Formula Name Isosource 1.5 07/16/23 0701   Intake (mL) - Formula Tube Feeding 50 07/16/23 0700   Residual Amount (ml) 30 ml 07/15/23 2337   Number of days: 2            Urethral Catheter 07/05/23 1332 Double-lumen (Active)   Site Assessment Clean;Intact 07/16/23 0701   Collection Container Urimeter 07/16/23 0701   Securement Method secured to top of thigh w/ adhesive device 07/16/23 0701   Catheter Care Performed yes 07/16/23 0701   Reason for Continuing Urinary Catheterization Non-healing sacral/perineal wound 07/16/23 0701   CAUTI Prevention Bundle Securement Device in place with 1" slack;Intact seal between catheter & drainage tubing;Drainage bag/urimeter off the floor;Sheeting clip in use;No dependent loops or kinks;Drainage bag/urimeter not overfilled (<2/3 full);Drainage bag/urimeter below bladder 07/16/23 0701   Output (mL) 75 mL 07/16/23 0738   Number of days: 10            Fecal Incontinence "  07/09/23 2300 (Active)   $ Fecal Management Supplies Fecal Management System (Supply) 07/14/23 1954   Application fecal incontinence  in place 07/16/23 0701   Drainage Method attached to drainage bag 07/16/23 0701   Securement to gravity 07/16/23 0701   Skin cleansed, skin barrier applied 07/16/23 0701   Tolerance no signs/symptoms of discomfort 07/16/23 0701   Stool (mL) 200 mL 07/16/23 0501   Number of days: 6       Imaging  ECG Results              EKG 12-lead (Final result)  Result time 07/05/23 13:56:05      Final result by Interface, Lab In [facility] (07/05/23 13:56:05)               Narrative:    Test Reason : E87.5,    Vent. Rate : 103 BPM     Atrial Rate : 103 BPM     P-R Int : 132 ms          QRS Dur : 076 ms      QT Int : 342 ms       P-R-T Axes : 073 054 084 degrees     QTc Int : 448 ms    Age and gender specific analysis  Sinus tachycardia  Low voltage QRS  Low anterior forces and R wave progression  Possibly acute STEMI    ACUTE MI / STEMI    Abnormal ECG  When compared with ECG of 05-JUL-2023 11:07,  No significant change was found  Confirmed by Abimael CLEMENTS MD ([id number]) on 7/5/2023 1:55:55 PM    Referred By: [email or web address]   SELF           Confirmed By:Abimael CLEMENTS MD                                   EKG 12-lead (Final result)  Result time 07/05/23 14:01:06      Final result by Interface, Lab In [facility] (07/05/23 14:01:06)               Narrative:    Test Reason : K92.2,    Vent. Rate : 096 BPM     Atrial Rate : 096 BPM     P-R Int : 114 ms          QRS Dur : 066 ms      QT Int : 352 ms       P-R-T Axes : 078 090 065 degrees     QTc Int : 444 ms    Normal sinus rhythm  Vertical axis  Otherwise normal ECG  When compared with ECG of 27-JUN-2023 12:05,  T wave inversion no longer evident in Anterior leads  Confirmed by Cameron Hodge MD ([id number]) on 7/5/2023 2:01:00 PM    Referred By: System System           Confirmed By:Cameron Hodge MD                                  Results for orders placed  during the hospital encounter of 05/28/23    Echo    Interpretation Summary  · The left ventricle is normal in size with hyperdynamic systolic function. The estimated ejection fraction is 70%.  · Normal right ventricular size with normal right ventricular systolic function.  · Normal left ventricular diastolic function.  · Mild left atrial enlargement.  · Mechanically ventilated; cannot use inferior caval vein diameter to estimate central venous pressure.  · Trivial pericardial effusion.  · There is a left pleural effusion.      X-Ray Chest AP Portable  Narrative: EXAMINATION:  XR CHEST AP PORTABLE    CLINICAL HISTORY:  Fever;    TECHNIQUE:  Single frontal view of the chest was performed.    COMPARISON:  No 07/16/2023 ne    FINDINGS:  Mild cardiomegaly.  Mild edema.  Small ill-defined opacities noted adjacent to the left hilum similar to the previous study.  Opacification at the left lung base probably atelectatic changes and  smaller amount of pleural effusion.  Impression: No significant changes    Electronically signed by: Americo Graf MD  Date:    07/25/2023  Time:    12:17      Labs, Imaging, EKG and Diagnostic results from 7/31/2023 were reviewed.    Medications:  Medication list was reviewed and changes noted under Assessment/Plan.  No current facility-administered medications on file prior to encounter.     Current Outpatient Medications on File Prior to Encounter   Medication Sig Dispense Refill    ARIPiprazole (ABILIFY) 20 MG Tab Take 5 mg by mouth once daily.      folic acid (FOLVITE) 1 MG tablet Take 1 tablet (1 mg total) by mouth once daily. (Patient taking differently: Take 1 mg by mouth every evening.) 30 tablet 2    furosemide (LASIX) 20 MG tablet Take 20 mg by mouth once daily.      lactulose (CHRONULAC) 10 gram/15 mL solution Take 10 g by mouth 3 (three) times daily.      lithium carbonate 150 MG capsule Take 1 capsule (150 mg total) by mouth every evening. (Patient taking differently: Take 150 mg  by mouth 2 (two) times a day.) 30 capsule 11    magnesium oxide (MAG-OX) 400 mg (241.3 mg magnesium) tablet Take by mouth once daily.      pantoprazole (PROTONIX) 40 MG tablet Take 40 mg by mouth once daily.      propranoloL (INDERAL) 40 MG tablet Take 40 mg by mouth once daily.      QUEtiapine (SEROQUEL) 300 MG Tab Take 100 mg by mouth every evening.      sodium bicarbonate 650 MG tablet Take 650 mg by mouth Daily.      spironolactone (ALDACTONE) 50 MG tablet Take 1 tablet (50 mg total) by mouth once daily. 90 tablet 3    zonisamide (ZONEGRAN) 100 MG Cap Take 100 mg by mouth once daily.      levETIRAcetam (KEPPRA) 1000 MG tablet Take 1,000 mg by mouth 2 (two) times daily.      OLANZapine (ZYPREXA) 10 MG tablet Take 1 tablet (10 mg total) by mouth every evening. (Patient not taking: Reported on 7/6/2023) 30 tablet 11    rifAXIMin (XIFAXAN) 550 mg Tab Take 1 tablet (550 mg total) by mouth 2 (two) times daily. (Patient not taking: Reported on 7/6/2023) 180 tablet 3     Scheduled Medications:  albuterol-ipratropium, 3 mL, Nebulization, Q6H WAKE  ciprofloxacin HCl, 500 mg, Oral, Q12H  lactulose, 30 g, Oral, TID  levetiracetam, 1,000 mg, Oral, BID  lithium, 300 mg, Oral, QHS  OLANZapine, 5 mg, Oral, QHS  pantoprazole, 40 mg, Oral, BID  rifAXImin, 550 mg, Oral, BID  sodium bicarbonate, 650 mg, Oral, BID      PRN: dextrose 10%, dextrose 10%, glucagon (human recombinant), insulin aspart U-100, oxyCODONE, sodium chloride 0.9%  Infusions:       Estimated Creatinine Clearance: 133.5 mL/min (A) (based on SCr of 0.4 mg/dL (L)).    Assessment/Plan:      * Gastrointestinal hemorrhage associated with duodenal ulcer    as  above        Urinary retention  7/27  2 episodes. s/p straight cath   7/28 Saldaña catheter placed for urinary retention         Irritant contact dermatitis due to fecal, urinary or dual incontinence  wound care eval      Dysphagia  7/18  MBS today -started puree nectar thick liquids.    7/29 SLP recs - Mechanical  soft, Thin  liquids     Hypoxia   7/16 sats 92% on 5L of NC.  CX ray - Detrimental changes since the previous examination including interval increase in pulmonary vascularity and bilateral diffuse interstitial pulmonary parenchymal opacification, progression of abnormal opacification at the left lung base, and development of small right-sided pleural effusion.   findings would be consistent with congestive heart failure.PT/OT recs SNF. BNP , procalcitonin and Echo.   7/27 Sats 97% on RA    H. pylori infection    positive serology for H pylori, begin triple therapy with clarithromycin,amoxicillin, and PPI for 14 days       Hematochezia    - GI EGD clipped duodenal ulcers for IR reference  - Transfuse blood products for active bleeding   - trend CBC and follow  - IR embolized GDA for duodenal hyperemia   -- GI re evaluated site of duodenal ulcers, no noted active bleeding  -- Continue PPI BID for 8 weeks, then once daily after that  --Pt had positive serology for H pylori, had triple therapy with clarithromycin,amoxicillin, and PPI for 14 days      Acute blood loss anemia     - Maintain IV access with 2 large bore Ivs  - Intravascular resuscitation/support with IVFs   - Hold all NSAIDs and anticoagulants, unless contraindicated.  - Please correct any coagulopathy with platelets and FFP for goal of platelets >50K and INR <2.0  - Please notify GI team if there is significant change in patient's clinical status  - To date, patient has required at least 21 units of pRBCs to maintain Hgb >7     7/16     Patient's with macrocytic anemia.. Hemoglobin stable. Etiology likely due to acute blood loss.  Current CBC reviewed-    Recent Labs   Lab 07/29/23  0346 07/30/23  0557 07/31/23  0444   HGB 8.4* 8.2* 8.8*         Component Value Date/Time     (H) 07/31/2023 0444    RDW SEE COMMENT 07/31/2023 0444    IRON 132 05/18/2023 1527    FERRITIN 110 05/18/2023 1527    RETIC 3.9 (H) 06/05/2023 0935    FOLATE 11.5 07/17/2023  0619    [id number] 07/17/2023 0619    OCCULTBLOOD Negative 09/19/2015 0137     Monitor CBC and transfuse if H/H drops below 7/21.        Retroperitoneal bleed  Retroperitoneum: No significant adenopathy.  Similar sized left retroperitoneal hematoma measuring 11 x 9 cm (series 5, image 100; previously 11 x 9 cm).  The left iliacus collection also measures similar to prior at 5.4 cm (series 5, image 127; previously 5.6 cm).  Layering hyperdensity within these collections suggesting different ages in blood products.  No significant volume active extravasation into these collections  - CT-A demonstrated stable retroperitoneal hematoma. No need for intervention at this time.          Supratherapeutic INR  patient with INR   Recent Labs   Lab 07/23/23  0434   INR 1.5*   . INR is supratherapeutic. secondary to coagulopathy from liver disease.monitor      Hypokalemia  replaced       UTI (urinary tract infection)  7/17 Saldaña removed. UA +. UC pending. started on IV ceftriaxone.  7/18 UC -YEAST 10,000 - 49,999 cfu/ml Identification pending No other significant isolate  7/28 continues with confusion. UA + . UC pending. continue ceftriaxone. no fistula found on sacral exam with wound care.   7/29   UC - GRAM NEGATIVE BILLY 10,000 - 49,999 cfu/ml  Identification and susceptibility pending     7/31 UC with klebsialla penumoniae and Aerogenes resisant to ceftriaxone. started on ciprofloxacin .    Bipolar 1 disorder     - Consult psych regarding Bipolar 1 disorder history  7/27 Sister wanted her started back on lithium but psychiatry recommended  increasing Zyprexa 7.5mg QHS. EKG to monitor Qtc.Discontinue zyprexa if Qtc >480.  EKG QTC 493ms.  Zyprexa swtiched to 5mg HS  7/28  started  lithium 300 mg  nightly due to concern for manic episode       Hepatic encephalopathy   ammonia 190s on admit -->90s . AAOX 1. no lactulose give due to GI bleed  repeat ammonia. Hepatolog eval  with     MELD 3.0: 19 at 7/25/2023  2:48 AM  MELD-Na:  15 at 7/25/2023  2:48 AM  Calculated from:  Serum Creatinine: 0.3 mg/dL (Using min of 1 mg/dL) at 7/25/2023  2:48 AM  Serum Sodium: 143 mmol/L (Using max of 137 mmol/L) at 7/25/2023  2:48 AM  Total Bilirubin: 3.1 mg/dL at 7/25/2023  2:48 AM  Serum Albumin: 1.8 g/dL at 7/25/2023  2:48 AM  INR(ratio): 1.5 at 7/23/2023  4:34 AM  Age at listing (hypothetical): 58 years  Sex: Female at 7/25/2023  2:48 AM  7/16 Ammonia 48 WNL. AAOX 1. Hepatology consulted for cirrhosis.    7/17  Hepatology eval. resumed lactulose.  7/18  hydroxyzine  PRN  discontinued  due to it not being safe in delirium  7/29 ammonia elevated at 70. lactulose increased to 30g TID.  started rifaximin BID     Severe protein-calorie malnutrition  Nutrition consulted. Most recent weight and BMI monitored-     Measurements:  Wt Readings from Last 1 Encounters:   07/27/23 61.2 kg (135 lb)   Body mass index is 24.69 kg/m².    Patient has been screened and assessed by RD.    Malnutrition Type:  Context: social/environmental circumstances  Level: severe    Malnutrition Characteristic Summary:  Energy Intake (Malnutrition): less than or equal to 75% for greater than or equal to 1 month  Subcutaneous Fat (Malnutrition): severe depletion  Muscle Mass (Malnutrition): severe depletion      History of seizure  on Keppra       Thrombocytopenia  with cirrhosis   Patient with thrombocytopenia   Recent Labs   Lab 07/29/23  0346 07/30/23  0557 07/31/23  0444   PLT 61* 55* 58*   . Platelet counts stable  .monitor      Cirrhosis, Laennec's  alcoholic cirrhosis  MELD 3.0: 19 at 7/25/2023  2:48 AM  MELD-Na: 15 at 7/25/2023  2:48 AM  Calculated from:  Serum Creatinine: 0.3 mg/dL (Using min of 1 mg/dL) at 7/25/2023  2:48 AM  Serum Sodium: 143 mmol/L (Using max of 137 mmol/L) at 7/25/2023  2:48 AM  Total Bilirubin: 3.1 mg/dL at 7/25/2023  2:48 AM  Serum Albumin: 1.8 g/dL at 7/25/2023  2:48 AM  INR(ratio): 1.5 at 7/23/2023  4:34 AM  Age at listing (hypothetical): 58 years  Sex:  Female at 7/25/2023  2:48 AM     No results for input(s): AMMONIA in the last 168 hours.  Strict input /output monitor, daily weights        VTE Risk Mitigation (From admission, onward)           Ordered     Place sequential compression device  Until discontinued         07/14/23 1024     Reason for No Pharmacological VTE Prophylaxis  Once        Question:  Reasons:  Answer:  Active Bleeding    07/05/23 1334     IP VTE HIGH RISK PATIENT  Once         07/05/23 1334                    Discharge Planning   BRAYAN: 8/3/2023     Code Status: DNR   Is the patient medically ready for discharge?: No    Reason for patient still in hospital (select all that apply): Treatment  Discharge Plan A: Skilled Nursing Facility   Discharge Delays: None known at this time              Seth Noyola MD  Department of Hospital Medicine   [facility] - Telemetry Stepdown

## 2023-08-01 LAB
ALBUMIN SERPL BCP-MCNC: 1.8 G/DL (ref 3.5–5.2)
ALP SERPL-CCNC: 123 U/L (ref 55–135)
ALT SERPL W/O P-5'-P-CCNC: 13 U/L (ref 10–44)
AMMONIA PLAS-SCNC: 61 UMOL/L (ref 10–50)
ANION GAP SERPL CALC-SCNC: 5 MMOL/L (ref 8–16)
ANISOCYTOSIS BLD QL SMEAR: SLIGHT
APTT PPP: 42.7 SEC (ref 21–32)
AST SERPL-CCNC: 30 U/L (ref 10–40)
BASOPHILS # BLD AUTO: 0.02 K/UL (ref 0–0.2)
BASOPHILS NFR BLD: 0.7 % (ref 0–1.9)
BILIRUB SERPL-MCNC: 2.6 MG/DL (ref 0.1–1)
BUN SERPL-MCNC: 3 MG/DL (ref 6–20)
BURR CELLS BLD QL SMEAR: ABNORMAL
CA-I BLDV-SCNC: 1.24 MMOL/L (ref 1.06–1.42)
CA-I BLDV-SCNC: 1.24 MMOL/L (ref 1.06–1.42)
CALCIUM SERPL-MCNC: 7.8 MG/DL (ref 8.7–10.5)
CHLORIDE SERPL-SCNC: 114 MMOL/L (ref 95–110)
CO2 SERPL-SCNC: 19 MMOL/L (ref 23–29)
CREAT SERPL-MCNC: 0.4 MG/DL (ref 0.5–1.4)
DIFFERENTIAL METHOD: ABNORMAL
EOSINOPHIL # BLD AUTO: 0.2 K/UL (ref 0–0.5)
EOSINOPHIL NFR BLD: 6 % (ref 0–8)
ERYTHROCYTE [DISTWIDTH] IN BLOOD BY AUTOMATED COUNT: 24.2 % (ref 11.5–14.5)
EST. GFR  (NO RACE VARIABLE): >60 ML/MIN/1.73 M^2
GLUCOSE SERPL-MCNC: 116 MG/DL (ref 70–110)
HCT VFR BLD AUTO: 25.9 % (ref 37–48.5)
HGB BLD-MCNC: 8.2 G/DL (ref 12–16)
HYPOCHROMIA BLD QL SMEAR: ABNORMAL
IMM GRANULOCYTES # BLD AUTO: 0 K/UL (ref 0–0.04)
IMM GRANULOCYTES NFR BLD AUTO: 0 % (ref 0–0.5)
LYMPHOCYTES # BLD AUTO: 0.7 K/UL (ref 1–4.8)
LYMPHOCYTES NFR BLD: 23.5 % (ref 18–48)
MAGNESIUM SERPL-MCNC: 1.6 MG/DL (ref 1.6–2.6)
MCH RBC QN AUTO: 33.3 PG (ref 27–31)
MCHC RBC AUTO-ENTMCNC: 31.7 G/DL (ref 32–36)
MCV RBC AUTO: 105 FL (ref 82–98)
MONOCYTES # BLD AUTO: 0.4 K/UL (ref 0.3–1)
MONOCYTES NFR BLD: 12.8 % (ref 4–15)
NEUTROPHILS # BLD AUTO: 1.6 K/UL (ref 1.8–7.7)
NEUTROPHILS NFR BLD: 57 % (ref 38–73)
NRBC BLD-RTO: 0 /100 WBC
OVALOCYTES BLD QL SMEAR: ABNORMAL
PLATELET # BLD AUTO: 62 K/UL (ref 150–450)
PLATELET BLD QL SMEAR: ABNORMAL
PMV BLD AUTO: 10.2 FL (ref 9.2–12.9)
POCT GLUCOSE: 118 MG/DL (ref 70–110)
POCT GLUCOSE: 149 MG/DL (ref 70–110)
POCT GLUCOSE: 177 MG/DL (ref 70–110)
POCT GLUCOSE: 92 MG/DL (ref 70–110)
POIKILOCYTOSIS BLD QL SMEAR: SLIGHT
POLYCHROMASIA BLD QL SMEAR: ABNORMAL
POTASSIUM SERPL-SCNC: 3.4 MMOL/L (ref 3.5–5.1)
PROT SERPL-MCNC: 4.5 G/DL (ref 6–8.4)
RBC # BLD AUTO: 2.46 M/UL (ref 4–5.4)
SODIUM SERPL-SCNC: 138 MMOL/L (ref 136–145)
WBC # BLD AUTO: 2.81 K/UL (ref 3.9–12.7)

## 2023-08-01 PROCEDURE — 94761 N-INVAS EAR/PLS OXIMETRY MLT: CPT

## 2023-08-01 PROCEDURE — 36415 COLL VENOUS BLD VENIPUNCTURE: CPT

## 2023-08-01 PROCEDURE — 80053 COMPREHEN METABOLIC PANEL: CPT

## 2023-08-01 PROCEDURE — 99232 PR SUBSEQUENT HOSPITAL CARE,LEVL II: ICD-10-PCS | Mod: ,,, | Performed by: PSYCHIATRY & NEUROLOGY

## 2023-08-01 PROCEDURE — 97530 THERAPEUTIC ACTIVITIES: CPT

## 2023-08-01 PROCEDURE — 25000003 PHARM REV CODE 250: Performed by: HOSPITALIST

## 2023-08-01 PROCEDURE — 99232 SBSQ HOSP IP/OBS MODERATE 35: CPT | Mod: ,,, | Performed by: PSYCHIATRY & NEUROLOGY

## 2023-08-01 PROCEDURE — 82140 ASSAY OF AMMONIA: CPT | Performed by: HOSPITALIST

## 2023-08-01 PROCEDURE — 82330 ASSAY OF CALCIUM: CPT | Mod: 91

## 2023-08-01 PROCEDURE — 99900035 HC TECH TIME PER 15 MIN (STAT)

## 2023-08-01 PROCEDURE — 83735 ASSAY OF MAGNESIUM: CPT

## 2023-08-01 PROCEDURE — 99233 PR SUBSEQUENT HOSPITAL CARE,LEVL III: ICD-10-PCS | Mod: ,,, | Performed by: HOSPITALIST

## 2023-08-01 PROCEDURE — 97535 SELF CARE MNGMENT TRAINING: CPT

## 2023-08-01 PROCEDURE — 25000242 PHARM REV CODE 250 ALT 637 W/ HCPCS: Performed by: HOSPITALIST

## 2023-08-01 PROCEDURE — 27000221 HC OXYGEN, UP TO 24 HOURS

## 2023-08-01 PROCEDURE — 94640 AIRWAY INHALATION TREATMENT: CPT

## 2023-08-01 PROCEDURE — 85730 THROMBOPLASTIN TIME PARTIAL: CPT

## 2023-08-01 PROCEDURE — 99233 SBSQ HOSP IP/OBS HIGH 50: CPT | Mod: ,,, | Performed by: HOSPITALIST

## 2023-08-01 PROCEDURE — 85025 COMPLETE CBC W/AUTO DIFF WBC: CPT

## 2023-08-01 PROCEDURE — 20600001 HC STEP DOWN PRIVATE ROOM

## 2023-08-01 RX ORDER — OLANZAPINE 5 MG/1
5 TABLET ORAL NIGHTLY
Qty: 30 TABLET | Refills: 11 | Status: ON HOLD | OUTPATIENT
Start: 2023-08-01 | End: 2023-09-21 | Stop reason: SDUPTHER

## 2023-08-01 RX ORDER — LITHIUM CARBONATE 300 MG/1
300 TABLET, FILM COATED, EXTENDED RELEASE ORAL NIGHTLY
Qty: 30 TABLET | Refills: 11 | Status: ON HOLD | OUTPATIENT
Start: 2023-08-01 | End: 2023-09-21 | Stop reason: SDUPTHER

## 2023-08-01 RX ORDER — ACETAMINOPHEN 500 MG
500 TABLET ORAL ONCE
Status: COMPLETED | OUTPATIENT
Start: 2023-08-01 | End: 2023-08-01

## 2023-08-01 RX ORDER — LACTULOSE 10 G/15ML
30 SOLUTION ORAL 3 TIMES DAILY
Qty: 4050 ML | Refills: 2 | Status: ON HOLD | OUTPATIENT
Start: 2023-08-01 | End: 2023-09-05 | Stop reason: SDUPTHER

## 2023-08-01 RX ORDER — PANTOPRAZOLE SODIUM 40 MG/1
40 FOR SUSPENSION ORAL 2 TIMES DAILY
Qty: 60 PACKET | Refills: 11 | Status: ON HOLD | OUTPATIENT
Start: 2023-08-01 | End: 2023-12-08 | Stop reason: HOSPADM

## 2023-08-01 RX ORDER — LEVETIRACETAM 100 MG/ML
1000 SOLUTION ORAL 2 TIMES DAILY
Qty: 600 ML | Refills: 11 | Status: SHIPPED | OUTPATIENT
Start: 2023-08-01 | End: 2024-07-31

## 2023-08-01 RX ORDER — CIPROFLOXACIN 500 MG/1
500 TABLET ORAL EVERY 12 HOURS
Qty: 4 TABLET | Refills: 0 | Status: SHIPPED | OUTPATIENT
Start: 2023-08-01 | End: 2023-08-03

## 2023-08-01 RX ADMIN — IPRATROPIUM BROMIDE AND ALBUTEROL SULFATE 3 ML: 2.5; .5 SOLUTION RESPIRATORY (INHALATION) at 07:08

## 2023-08-01 RX ADMIN — LITHIUM CARBONATE 300 MG: 300 TABLET, FILM COATED, EXTENDED RELEASE ORAL at 09:08

## 2023-08-01 RX ADMIN — POTASSIUM BICARBONATE 40 MEQ: 391 TABLET, EFFERVESCENT ORAL at 01:08

## 2023-08-01 RX ADMIN — ACETAMINOPHEN 500 MG: 500 TABLET ORAL at 01:08

## 2023-08-01 RX ADMIN — RIFAXIMIN 550 MG: 550 TABLET ORAL at 09:08

## 2023-08-01 RX ADMIN — CIPROFLOXACIN HYDROCHLORIDE 500 MG: 500 TABLET, FILM COATED ORAL at 09:08

## 2023-08-01 RX ADMIN — SODIUM BICARBONATE 650 MG TABLET 650 MG: at 09:08

## 2023-08-01 RX ADMIN — LACTULOSE 30 G: 20 SOLUTION ORAL at 09:08

## 2023-08-01 RX ADMIN — IPRATROPIUM BROMIDE AND ALBUTEROL SULFATE 3 ML: 2.5; .5 SOLUTION RESPIRATORY (INHALATION) at 01:08

## 2023-08-01 RX ADMIN — OLANZAPINE 5 MG: 5 TABLET, FILM COATED ORAL at 09:08

## 2023-08-01 RX ADMIN — PANTOPRAZOLE SODIUM 40 MG: 40 GRANULE, DELAYED RELEASE ORAL at 09:08

## 2023-08-01 RX ADMIN — LEVETIRACETAM 1000 MG: 500 SOLUTION ORAL at 09:08

## 2023-08-01 RX ADMIN — IPRATROPIUM BROMIDE AND ALBUTEROL SULFATE 3 ML: 2.5; .5 SOLUTION RESPIRATORY (INHALATION) at 08:08

## 2023-08-01 NOTE — PLAN OF CARE
Problem: Infection  Goal: Absence of Infection Signs and Symptoms  Outcome: Ongoing, Progressing     Problem: Adult Inpatient Plan of Care  Goal: Plan of Care Review  Outcome: Ongoing, Progressing  Goal: Optimal Comfort and Wellbeing  Outcome: Ongoing, Progressing   Patient vital signs stable.  Patient slept well at night.  Safety measures ensured. Call light within reach.

## 2023-08-01 NOTE — PT/OT/SLP PROGRESS
Physical Therapy Treatment    Patient Name:  Marely Hamilton   MRN:  [id number]    Recommendations:     Discharge Recommendations: nursing facility, skilled  Discharge Equipment Recommendations: to be determined by next level of care  Barriers to discharge: None    Assessment:     Marely Hamilton is a 58 y.o. female admitted with a medical diagnosis of Gastrointestinal hemorrhage associated with duodenal ulcer.  She presents with the following impairments/functional limitations: weakness, impaired endurance, impaired self care skills, impaired functional mobility, gait instability, impaired balance, impaired cognition, decreased coordination, decreased upper extremity function, decreased lower extremity function Pt. Cooperative but reluctant to get OOB. Pt. wanted to sit on EOB to eat lunch.    Rehab Prognosis: Fair; patient would benefit from acute skilled PT services to address these deficits and reach maximum level of function.    Recent Surgery: Procedure(s) (LRB):  EGD (ESOPHAGOGASTRODUODENOSCOPY) (N/A) 21 Days Post-Op    Plan:     During this hospitalization, patient to be seen 3 x/week to address the identified rehab impairments via therapeutic activities, therapeutic exercises, neuromuscular re-education and progress toward the following goals:    Plan of Care Expires:  08/10/23    Subjective     Chief Complaint: weakness  Patient/Family Comments/goals: pt. Agreeable to PT  Pain/Comfort:  Pain Rating 1: 0/10  Pain Rating Post-Intervention 1: 0/10      Objective:     Communicated with nursing prior to session.  Patient found supine with baugh catheter, telemetry upon PT entry to room.     General Precautions: Standard, fall  Orthopedic Precautions: N/A  Braces: N/A  Respiratory Status: Nasal cannula, flow 2 L/min     Functional Mobility:  Bed Mobility:     Rolling Right: maximal assistance  Scooting: maximal assistance  Supine to Sit: maximal assistance  Sit to Supine: maximal assistance  Transfers:     Sit to  Stand:  total assistance with hand-held assist  Balance: fair->poor sitting      AM-PAC 6 CLICK MOBILITY  Turning over in bed (including adjusting bedclothes, sheets and blankets)?: 2  Sitting down on and standing up from a chair with arms (e.g., wheelchair, bedside commode, etc.): 1  Moving from lying on back to sitting on the side of the bed?: 2  Moving to and from a bed to a chair (including a wheelchair)?: 1  Need to walk in hospital room?: 1  Climbing 3-5 steps with a railing?: 1  Basic Mobility Total Score: 8       Treatment & Education:  Discussed pt.'s progress, goals, and POC. Pt. Performed sitting balance/tolerance on EOB while eating her lunch with OT assist.     Patient left supine with all lines intact and call button in reach..    GOALS:   Multidisciplinary Problems       Physical Therapy Goals          Problem: Physical Therapy    Goal Priority Disciplines Outcome Goal Variances Interventions   Physical Therapy Goal     PT, PT/OT Ongoing, Progressing     Description: Goals to be met by: 8/10/23     Patient will increase functional independence with mobility by performin. Supine to sit with Moderate Assistance  2. Rolling to Left  with Minimal Assistance.  3. Sit to stand transfer with Moderate Assistance  4. Bed to chair transfer with Maximum Assistance   5. Sitting at edge of bed x10 minutes with Contact Guard Assistance to perform functional activities.   6. Lower extremity exercise program x10 reps per handout, with assistance as needed to improve strength for functional activities.                          Time Tracking:     PT Received On: 23  PT Start Time: 1347     PT Stop Time: 1404  PT Total Time (min): 17 min     Billable Minutes: Therapeutic Activity 17    Treatment Type: Treatment  PT/PTA: PT     Number of PTA visits since last PT visit: 0     2023

## 2023-08-01 NOTE — PROGRESS NOTES
CONSULTATION LIAISON PSYCHIATRY PROGRESS NOTE    Patient Name: Marely Hamilton  MRN: [id number]  Patient Class: IP- Inpatient  Admission Date: 7/5/2023  Attending Physician: Seth Noyola MD      SUBJECTIVE:   Marely Hamilton is a 58 y.o. female with past psychiatric history of bipolar disorder, alcohol use disorder & past pertinent medical history of seizure disorder, alcohol induced hepatic cirrhosis presents to the ED/admitted to the hospital for Gastrointestinal hemorrhage associated with duodenal ulcer. Patient presented from SNF (when she experienced alida blood per rectum per chart review), for which she was recently discharged to when she presented to the hospital on for hepatic encephalopathy c/b retroperitoneal bleed (s/p ICV filter and IR embolization).     Psychiatry consulted for  medication management       Today, patient stated that she felt better. She discussed the things that she ate this morning and last night. Also, called and spoke with patient's sister Zaria while at bedside. Patient spoke to her sister over the phone and had an appropriate, linear, and organized conversation.    Collateral:  Spoke with Zaria at [phone]. Zaria stated that she is currently in [place] but she last spoke to the patient on Friday, July 28. Zaria stated that the patient was last at baseline about 2 months ago prior to hospitalization. She sated that typically the patient is completely oriented, drives a car, cooks, and cleans. Per sister, when she was initially admitted, her psych medications were stopped and she had a UTI and has not been herself since that time. Today, Zaria stated that the patient sounds much better than when she talked to her on Friday. Zaria remains hopeful about her progress.       OBJECTIVE:  General Appearance: dressed in hospital garb, appears older than stated age, thin and gaunt  Behavior: cooperative, friendly  Involuntary Movements and Motor Activity: no  tremors  Gait and Station: unable to assess - patient lying down or seated  Speech and Language: fluent English, soft, monotone, talkative  Mood: Unable to assess  Affect: calm  Thought Process and Associations: bizarre, tangential, +loosening of associations  Thought Content and Perceptions:: + delusions  Sensorium and Orientation: oriented to person and place and year  Recent and Remote Memory:  impaired due to mental status  Attention and Concentration: attentive to conversation, easily distractible  Fund of Knowledge: Unable to Formally Assess  Insight: impaired due to patient's medical condition  Judgment: impaired due to patient' medical condition    CAM ICU positive? no      ASSESSMENT & RECOMMENDATIONS   Bipolar Disorder  R/o delirium, likely due to UTI in setting of +UA, on antibiotics now     PSYCH MEDICATIONS  Scheduled: Continue lithium 300 mg nightly and Continue zyprexa 5 mg nightly  Does not appear to be requiring PRNs at this time, in future if patient does become agitated recommend Zyprexa 5 mg PO/IM.   Monitor QTc with use of zyprexa.      DELIRIUM PRECAUTION BEHAVIOR MANAGEMENT  PLEASE utilize CHEMICAL restraints with PRN meds first for agitation. Minimize use of PHYSICAL restraints OR have periods of being out of physical restraints if possible.  Keep window shades open and room lit during day and room dim at night in order to promote normal sleep-wake cycles  Encourage family at bedside. [place] patient often to situation, location, date.  Continue to Limit or Discontinue use of Narcotics, Benzos and Anti-cholinergic medications as they may worsen delirium.  Continue medical workup for causative etiology of Delirium.      RISK ASSESSMENT  NO NEED FOR PEC patient NOT in any imminent danger of hurting self or others and not gravely disabled.      FOLLOW UP  Will follow up while in house     DISPOSITION - once medically cleared:   Defer to medical team    Please contact ON CALL psychiatry service  (24/7) for any acute issues that may arise.    Dr. Mehdi Aguilera   Psychiatry  [facility]  8/1/2023 9:33 AM        --------------------------------------------------------------------------------------------------------------------------------------------------------------------------------------------------------------------------------------    CONTINUED OBJECTIVE clinical data & findings reviewed and noted for above decision making    Current Medications:   Scheduled Meds:    albuterol-ipratropium  3 mL Nebulization Q6H WAKE    ciprofloxacin HCl  500 mg Oral Q12H    lactulose  30 g Oral TID    levetiracetam  1,000 mg Oral BID    lithium  300 mg Oral QHS    OLANZapine  5 mg Oral QHS    pantoprazole  40 mg Oral BID    rifAXImin  550 mg Oral BID    sodium bicarbonate  650 mg Oral BID     PRN Meds: dextrose 10%, dextrose 10%, glucagon (human recombinant), insulin aspart U-100, oxyCODONE, sodium chloride 0.9%    Allergies:   Review of patient's allergies indicates:   Allergen Reactions    Sulfa (sulfonamide antibiotics) Rash    Codeine Nausea And Vomiting       Vitals  Vitals:    08/01/23 0803   BP:    Pulse: 76   Resp: (!) 22   Temp:        Labs/Imaging/Studies:  Recent Results (from the past 24 hour(s))   Calcium, ionized    Collection Time: 07/31/23  3:48 PM   Result Value Ref Range    Ionized Calcium 1.14 1.06 - 1.42 mmol/L   POCT glucose    Collection Time: 07/31/23  4:34 PM   Result Value Ref Range    POCT Glucose 169 (H) 70 - 110 mg/dL   POCT glucose    Collection Time: 08/01/23 12:56 AM   Result Value Ref Range    POCT Glucose 177 (H) 70 - 110 mg/dL   Ammonia    Collection Time: 08/01/23  5:00 AM   Result Value Ref Range    Ammonia 61 (H) 10 - 50 umol/L   APTT    Collection Time: 08/01/23  5:01 AM   Result Value Ref Range    aPTT 42.7 (H) 21.0 - 32.0 sec   Magnesium    Collection Time: 08/01/23  5:01 AM   Result Value Ref Range    Magnesium 1.6 1.6 - 2.6 mg/dL   Calcium, ionized     Collection Time: 08/01/23  5:01 AM   Result Value Ref Range    Ionized Calcium 1.24 1.06 - 1.42 mmol/L   CBC Auto Differential    Collection Time: 08/01/23  5:01 AM   Result Value Ref Range    WBC 2.81 (L) 3.90 - 12.70 K/uL    RBC 2.46 (L) 4.00 - 5.40 M/uL    Hemoglobin 8.2 (L) 12.0 - 16.0 g/dL    Hematocrit 25.9 (L) 37.0 - 48.5 %     (H) 82 - 98 fL    MCH 33.3 (H) 27.0 - 31.0 pg    MCHC 31.7 (L) 32.0 - 36.0 g/dL    RDW 24.2 (H) 11.5 - 14.5 %    Platelets 62 (L) 150 - 450 K/uL    MPV 10.2 9.2 - 12.9 fL    Immature Granulocytes 0.0 0.0 - 0.5 %    Gran # (ANC) 1.6 (L) 1.8 - 7.7 K/uL    Immature Grans (Abs) 0.00 0.00 - 0.04 K/uL    Lymph # 0.7 (L) 1.0 - 4.8 K/uL    Mono # 0.4 0.3 - 1.0 K/uL    Eos # 0.2 0.0 - 0.5 K/uL    Baso # 0.02 0.00 - 0.20 K/uL    nRBC 0 0 /100 WBC    Gran % 57.0 38.0 - 73.0 %    Lymph % 23.5 18.0 - 48.0 %    Mono % 12.8 4.0 - 15.0 %    Eosinophil % 6.0 0.0 - 8.0 %    Basophil % 0.7 0.0 - 1.9 %    Platelet Estimate Decreased (A)     Aniso Slight     Poik Slight     Poly Occasional     Hypo Occasional     Ovalocytes Occasional     David Cells Occasional     Differential Method Automated    Comprehensive Metabolic Panel    Collection Time: 08/01/23  5:01 AM   Result Value Ref Range    Sodium 138 136 - 145 mmol/L    Potassium 3.4 (L) 3.5 - 5.1 mmol/L    Chloride 114 (H) 95 - 110 mmol/L    CO2 19 (L) 23 - 29 mmol/L    Glucose 116 (H) 70 - 110 mg/dL    BUN 3 (L) 6 - 20 mg/dL    Creatinine 0.4 (L) 0.5 - 1.4 mg/dL    Calcium 7.8 (L) 8.7 - 10.5 mg/dL    Total Protein 4.5 (L) 6.0 - 8.4 g/dL    Albumin 1.8 (L) 3.5 - 5.2 g/dL    Total Bilirubin 2.6 (H) 0.1 - 1.0 mg/dL    Alkaline Phosphatase 123 55 - 135 U/L    AST 30 10 - 40 U/L    ALT 13 10 - 44 U/L    eGFR >60.0 >60 mL/min/1.73 m^2    Anion Gap 5 (L) 8 - 16 mmol/L   POCT glucose    Collection Time: 08/01/23  6:22 AM   Result Value Ref Range    POCT Glucose 118 (H) 70 - 110 mg/dL     Imaging Results              CTA Acute GI [place], Abdomen and Pelvis  (Final result)  Result time 07/05/23 17:15:19      Final result by Mumtaz Garsia MD (07/05/23 17:15:19)                   Impression:      Images are degraded by significant streak and motion artifacts.    Stable large left retroperitoneal hematoma and left iliacus hematoma.  No contrast extravasation within the hematomas or bowel to indicate active arterial bleed.    Hepatic cirrhosis.  Diffuse wall thickening of the stomach and colon, which can be seen in the setting of hepatic dysfunction.  However, superimposed inflammatory processes are not excluded.    Multiple, nondilated, distended fluid-filled loops of small bowel without a definite transition point.  Stool and air seen within the colon, this is suggestive of ileus.    Small left pleural effusion with associated compressive atelectasis.    Cholelithiasis.    Cardiomegaly.    Electronically signed by resident: Emiliano Mcmahon  Date:    07/05/2023  Time:    16:29    Electronically signed by: Mumtaz Garsia MD  Date:    07/05/2023  Time:    17:15               Narrative:    EXAMINATION:  CTA ACUTE GI BLEED, ABDOMEN AND PELVIS    CLINICAL HISTORY:  GI bleed;    TECHNIQUE:  Initial noncontrast  images were obtained of the abdomen and pelvis.  CT axial angiography images were then obtained from the lung bases to the pubic symphysis following the intravenous administration of 100 of Omnipaque 350 with delayed images obtained per GI bleeding protocol.  Sagittal and coronal reformats were provided.    COMPARISON:  CTA GI bleed 06/13/2023    FINDINGS:  Images are degraded by significant streak and motion artifacts.    Lungs: Interval decrease in size of the small right pleural effusion with associated compressive atelectasis.  Resolution of the left pleural effusion.    Heart: Enlarged.  No pericardial effusion.    Liver: Nodular contour of the liver.  No focal abnormality.    Gallbladder: Multiple calcified gallstones.  No pericholecystic inflammatory  changes.    Bile ducts: No intrahepatic or extrahepatic biliary ductal dilatation.    Spleen: Normal size.    Pancreas: No mass. No ductal dilatation. No peripancreatic fat stranding.    Adrenals: No significant abnormality    Renal/Ureters: Bilateral cortical thinning.  No hydronephrosis.  Proximal ureters are normal caliber.  The distal ureters are difficult to evaluate due to streak artifact.  Bladder is decompressed with Saldaña catheter in place.    Reproductive: Uterus appears without significant abnormality.  No adnexal mass, noting limited evaluation due to significant streak artifact.    Stomach/Bowel: Stomach is distended with ingested contents with thickened rugal folds.  Small bowel is distended with multiple nondilated fluid-filled loops.  No transition point.  Appendix is normal.  Diffuse wall thickening throughout the colon with mild stool burden.  Diffuse wall thickening of the rectum.  No contrast extravasation to indicate active arterial bleed.    Peritoneum: Stable large left retroperitoneal hematoma measuring 11.5 x 7.8 x 12.6 cm.  No contrast extravasation to indicate active bleed within the hematoma.  Additional smaller hematoma anterior to the left iliacus muscle, which appears stable compared to prior CTA.  No free fluid. No intraperitoneal free air.    Lymph Nodes: Multiple prominent mesenteric and retroperitoneal lymph nodes.    Vasculature: Abdominal aorta tapers normally.  Mild atherosclerosis of the abdominal aorta and its branches.    Bones: Bony mineralization is diminished.  Left hip arthroplasty.  Generalized body wall edema.    Soft Tissues: No significant abnormality.                                       CT Head Without Contrast (Final result)  Result time 07/05/23 15:10:53      Final result by Viktor Desouza MD (07/05/23 15:10:53)                   Impression:      No evidence of acute intracranial pathology.    Volume loss again identified.    Electronically signed by resident:  Kenney Alison  Date:    07/05/2023  Time:    14:45    Electronically signed by: Viktor Desouza  Date:    07/05/2023  Time:    15:10               Narrative:    EXAMINATION:  CT HEAD WITHOUT CONTRAST    CLINICAL HISTORY:  Mental status change, unknown cause;    TECHNIQUE:  Low dose axial CT images obtained throughout the head without the use of intravenous contrast.  Axial, sagittal and coronal reconstructions were performed.    COMPARISON:  CT head 06/22/2023    FINDINGS:  Intracranial compartment:    Volume loss without evidence of hydrocephalus.    No parenchymal  hemorrhage, edema, mass effect or major vascular distribution infarct.    Decreased attenuation in the periventricular white matter is nonspecific but may reflect mild chronic small vessel ischemic change vs other encephalopathy.    No extra-axial blood or fluid collections.    Skull/extracranial contents (limited evaluation):    No displaced calvarial fracture.    The visualized sinuses and mastoid air cells are clear.                                       X-Ray Chest AP Portable (Final result)  Result time 07/05/23 13:09:11      Final result by Americo Reyes MD (07/05/23 13:09:11)                   Impression:      No significant detrimental interval change in the appearance of the chest since 06/25/2023 is appreciated, with significant improvement as noted above.  No post procedure pneumothorax.      Electronically signed by: Americo Reyes MD  Date:    07/05/2023  Time:    13:09               Narrative:    EXAMINATION:  XR CHEST AP PORTABLE    TECHNIQUE:  One view was obtained.  Note is made of the fact that the thorax is obscured to some extent by opacities external to the patient, particularly defibrillator pads.    COMPARISON:  Comparison is made to the most recent prior chest radiograph of 06/25/2023.    FINDINGS:  Endotracheal tube has been placed, its tip lying just superior to the apex of the aortic arch, well above the katharina.  Left jugular  origin vascular catheter is now seen, its tip in the superior vena cava near the junction of the right and left brachiocephalic veins.  Allowing for magnification of the cardiomediastinal silhouette related to projection, the heart is not significantly enlarged.  Opacity in both inferior hemithoraces seen on the previous examination has resolved, consistent with clearing of airspace consolidation in both mid/lower lung zones and resolution of previously present bilateral pleural fluid.  Lung zones are currently clear and free of significant airspace consolidation or volume loss.  No pleural fluid of any substantial volume is seen on either side.  No pneumothorax.

## 2023-08-01 NOTE — PROGRESS NOTES
Jimmy Phillip - Telemetry [facility]  Progress Note    Patient Name: Marely Hamilton  MRN: [id number]  Patient Class: IP- Inpatient   Admission Date: 7/5/2023  Length of Stay: 27 days  Attending Physician: Seth Noyola MD  Primary Care Provider: Viktor Ross MD        Subjective:     Principal Problem:Gastrointestinal hemorrhage associated with duodenal ulcer        HPI:  Marely Hamilton is a 58 y.o. female with history of alcoholic cirrhosis, seizure disorder, DVT and IJ thrombus with recent admission for large retroperitoneal hemorrhage requiring IR embolization and IVC filter placement who presents for hematochezia. Onset this morning at [facility], alida blood per rectum per chart, sent to ED. Initially normotensive but then severe hypotension prompted initiation of levophed and intubation. Hgb 6.8, 2u pRBC given + 1u FFP ordered. Hgb 8.5 on 7/2. On levo and vaso currently. K 6.6 but renal function at baseline. Repeat pending. No prior EGDs on record.          Overview/Hospital Course:    Patient was seen at bedside, hematochezia still present post op by IR. Patient has required many transfusions of pRBCs and platelets due to ongoing down trending of Hgb. No clear source of ongoing hemorrhage by imaging. Patient has continued to require pressors to maintain MAP >65. Discussed with GI and IR regarding active bleeding post op, IR indicated there is not much else to do from their end bc they embolized a significant part of GDA. Pt is off of pressor requirement and not actively bleeding. GI re evaluated pt via EGD and found no sources of active bleeding. Pt is extubated and currently on BIPAP requirement due to de saturation in the 80's. Pt is to be seen by speech and PT/OT. Clear mucinous secretions via suction, chest physiotherapy, and cough assist device. Nebs to help with breathing as well. Pt is off BIPAP and currently on comfort flow. Triple therapy (amoxicillin, clarithromycin) started for 14 days  to treat for positive H pylori serology. Pt also had a NG tube placed so we may begin tube feedings. CxR, EKG, and ABG displayed no reason for tachycardia. Pt becomes anxious when seen by different provider teams. Remove art line and consult for midline placement. Continue to wean comfort flow as tolerated. Ensure pt is working with PT/OT/Speech for continuous improvement. Consult psych regarding patient history of bipolar disorder.         Interval History/Significant Events: Wean comfort flow as tolerated. Ensure patient is working with PT/OT/Speech for continuous improvement. Consult psych regarding psych history     7/16   history of alcoholic cirrhosis, seizure disorder, DVT and IJ thrombus with recent admission for large retroperitoneal hemorrhage requiring IR embolization and IVC filter placement who presents for hematochezia. s/p GI and IR in which they clipped (GI) and embolized (IR) possible sources of duodenal ulcer bleeding.  Hb stable  s/p extubation. sats 92% on 5L of NC.  CX ray - Detrimental changes since the previous examination including interval increase in pulmonary vascularity and bilateral diffuse interstitial pulmonary parenchymal opacification, progression of abnormal opacification at the left lung base, and development of small right-sided pleural effusion.   findings would be consistent with congestive heart failure.PT/OT recs SNF. BNP , procalcitonin and Echo.  SLP recs NPO .  On NGT feeds. psychiatry following for bipolar disorders.  Recs Zyprexa 5 mg QHS . Ammonia 48 WNL. AAOX 1. Hepatology consulted for cirrhosis. UA ordered   7/17 Saldaña removed. UA +. UC pending. started on IV ceftriaxone. ammonia at 59. on B/L UE mittens as pulled of NGT. Hepatology eval. resumed lactulose . discussed with sister and updated clinical status   7/18  MBS today - started puree nectar thick liquids.  on oxygen 3L via NC.   wean  as tolerated . UC -YEAST 10,000 - 49,999 cfu/ml Identification pending No  other significant isolate. hydroxyzine  PRN discontinued  due to it not being safe in delirium. oriented to person, hospital and year   7/19 SLP recs - tolerating Puree Diet - IDDSI Level 4, Mildly thick/Nectar thick liquids - IDDSI Level 2 .stool output 600ml/ monitor on lactulose . improved mentation AAOX 3  7/26 Await NH placement. Little improvement in functional status. Tunneled fistula seen near rectum. Low grade temp.  7/26 K replaced. Await NH placement. Little improvement in functional status. Tunneled fistula seen near rectum. Low grade temp. Consulted psych for recs concerning discharge.   7/27 Sister wanted her started back on lithium but psychiatry recommended  increasing Zyprexa 7.5mg QHS. EKG to monitor QtcDiscontinue zyprexa if Qtc >480. EKG QTC 493ms.  Zyprexa resumed at  zyprexa 5 nightly per psychiatry . Urinary retention this AM s/p straight cath   7/28 continues with confusion. UA + . UC pending. continue ceftriaxone. no fistula found on scaral exam with wound care.  Repeat EKG today to monitor Qtc. Saldaña catheter placed for urinary retention .  started  lithium 300 mg  nightly due to concern for manic episode  7/29 SLP recs - Mechanical soft, Thin . UC - GRAM NEGATIVE BILLY 10,000 - 49,999 cfu/ml  Identification and susceptibility pending  .ammonia elevated at 70. lactulose increased to 30g TID .started rifaximin BID   7/30 BMX 1 documented . pending mental status improvement   7/31 UC with klebsialla penumoniae and Aerogenes resisant to ceftriaxone. started on ciprofloxacin . sodium bicarbonate for bicarb of 16   8/1 K replaced. ammonia trending down . QTc  458ms . improved mentation today. oriented to date and month           Review of Systems:   Pain scale:   Constitutional:  fever,  chills, headache, vision loss, hearing loss, weight loss, Generalized weakness, falls, loss of smell, loss of taste, poor appetite,  sore throat  Respiratory: cough, shortness of breath.   Cardiovascular: chest  pain, dizziness, palpitations, orthopnea, swelling of feet, syncope  Gastrointestinal: nausea, vomiting, abdominal pain, diarrhea, black stool,  blood in stool, change in bowel habits  Genitourinary: hematuria, dysuria, urgency, frequency  Integument/Breast: rash,  pruritis  Hematologic/Lymphatic: easy bruising, lymphadenopathy  Musculoskeletal: arthralgias , myalgias, back pain, neck pain, knee pain  Neurological: confusion, seizures, tremors, slurred speech  Behavioral/Psych:  depression, anxiety, auditory or visual hallucinations     OBJECTIVE:     Physical Exam:  Body mass index is 24.69 kg/m².    Constitutional: Appears well-developed and well-nourished.   Head: Normocephalic and atraumatic. + icterus  Neck: Normal range of motion. Neck supple.   Cardiovascular: Normal heart rate.  Regular heart rhythm.  Pulmonary/Chest: Effort normal.   Abdominal: No distension.  No tenderness.  baugh catheter   Musculoskeletal: Normal range of motion.   Neurological: Alert and  oriented to person, hospital follows simple commands .not oriented to situation  Skin: Skin is warm and dry. ehcymoses on the upper chest    Psychiatric: Normal mood and affect. Behavior is normal.                  Vital Signs  Temp: 98.2 °F (36.8 °C) (08/01/23 1318)  Pulse: 76 (08/01/23 1338)  Resp: 16 (08/01/23 1338)  BP: 115/63 (08/01/23 1147)  SpO2: 97 % (08/01/23 1338)     24 Hour VS Range    Temp:  [97.2 °F (36.2 °C)-98.8 °F (37.1 °C)]   Pulse:  [76-82]   Resp:  [16-22]   BP: (105-129)/(55-63)   SpO2:  [97 %-100 %]     Intake/Output Summary (Last 24 hours) at 8/1/2023 1413  Last data filed at 8/1/2023 0647  Gross per 24 hour   Intake no documentation   Output 1350 ml   Net -1350 ml         I/O This Shift:  No intake/output data recorded.    Wt Readings from Last 3 Encounters:   07/28/23 61.2 kg (135 lb)   07/03/23 44.7 kg (98 lb 8.7 oz)   06/29/23 59.9 kg (132 lb 0.9 oz)       I have personally reviewed the vitals and recorded Intake/Output  "    Laboratory/Diagnostic Data:    CBC/Anemia Labs: Coags:    Recent Labs   Lab 07/30/23  0557 07/31/23  0444 08/01/23  0501   WBC 2.95* 2.18* 2.81*   HGB 8.2* 8.8* 8.2*   HCT 25.8* 29.0* 25.9*   PLT 55* 58* 62*   * 107* 105*   RDW SEE COMMENT SEE COMMENT 24.2*    Recent Labs   Lab 07/30/23  0557 07/31/23  0444 08/01/23  0501   APTT 38.5* 39.3* 42.7*        Chemistries: ABG:   Recent Labs   Lab 07/30/23  0557 07/31/23  0444 08/01/23  0501    138 138   K 3.6 3.6 3.4*   * 116* 114*   CO2 18* 16* 19*   BUN 3* 3* 3*   CREATININE 0.4* 0.4* 0.4*   CALCIUM 7.7* 7.9* 7.8*   PROT 4.7* 4.8* 4.5*   BILITOT 3.1* 3.2* 2.6*   ALKPHOS 95 102 123   ALT 10 13 13   AST 29 32 30   MG 1.6 1.6 1.6    No results for input(s): "PH", "PCO2", "PO2", "HCO3", "POCSATURATED", "BE" in the last 168 hours.       POCT Glucose: HbA1c:    Recent Labs   Lab 07/30/23  2346 07/31/23  0614 07/31/23  1634 08/01/23  0056 08/01/23  0622 08/01/23  1145   POCTGLUCOSE 134* 89 169* 177* 118* 149*    Hemoglobin A1C   Date Value Ref Range Status   05/29/2023 <4.0 (A) 4.0 - 5.6 % Final     Comment:     ADA Screening Guidelines:  5.7-6.4%  Consistent with prediabetes  >or=6.5%  Consistent with diabetes    High levels of fetal hemoglobin interfere with the HbA1C  assay. Heterozygous hemoglobin variants (HbS, HgC, etc)do  not significantly interfere with this assay.   However, presence of multiple variants may affect accuracy.  Falsely low HA1c results may be observed in patients   with clinical conditions that shorten erythrocyte   life span or decrease mean erythrocyte age.  HA1c   may not accurately reflect glycemic control when   clinical conditions that affect erythrocyte survival   are present. Fructosamine may be used as an alternate  measurement of glycemic control.     01/22/2021 4.1 4.0 - 5.6 % Final     Comment:     ADA Screening Guidelines:  5.7-6.4%  Consistent with prediabetes  >or=6.5%  Consistent with diabetes  High levels of fetal " "hemoglobin interfere with the HbA1C  assay. Heterozygous hemoglobin variants (HbS, HgC, etc)do  not significantly interfere with this assay.   However, presence of multiple variants may affect accuracy.     12/10/2020 4.6 4.0 - 5.6 % Final     Comment:     ADA Screening Guidelines:  5.7-6.4%  Consistent with prediabetes  >or=6.5%  Consistent with diabetes  High levels of fetal hemoglobin interfere with the HbA1C  assay. Heterozygous hemoglobin variants (HbS, HgC, etc)do  not significantly interfere with this assay.   However, presence of multiple variants may affect accuracy.          Cardiac Enzymes: Ejection Fractions:    No results for input(s): "CPK", "CPKMB", "MB", "TROPONINI" in the last 72 hours. EF   Date Value Ref Range Status   07/16/2023 65 % Final   06/03/2023 70 % Final          No results for input(s): "COLORU", "APPEARANCEUA", "PHUR", "SPECGRAV", "PROTEINUA", "GLUCUA", "KETONESU", "BILIRUBINUA", "OCCULTUA", "NITRITE", "UROBILINOGEN", "LEUKOCYTESUR", "RBCUA", "WBCUA", "BACTERIA", "SQUAMEPITHEL", "HYALINECASTS" in the last 48 hours.    Invalid input(s): "WRIGHTSUR"        Procalcitonin (ng/mL)   Date Value   06/16/2023 0.26 (H)     Lactate (Lactic Acid) (mmol/L)   Date Value   07/16/2023 1.4   07/16/2023 1.4   07/06/2023 1.5   07/05/2023 8.0 (HH)   06/11/2023 6.2 (HH)     BNP (pg/mL)   Date Value   07/16/2023 174 (H)     No results found for: "CRP", "SEDRATE"  D-Dimer (mg/L FEU)   Date Value   07/07/2023 2.22 (H)     Ferritin (ng/mL)   Date Value   05/18/2023 110   10/23/2015 412 (H)   09/19/2015 599 (H)   09/19/2015 599 (H)   07/23/2015 551 (H)   06/17/2015 612 (H)     LD (U/L)   Date Value   06/05/2023 249   05/31/2023 426 (H)   09/19/2015 280 (H)     Troponin I (ng/mL)   Date Value   06/13/2023 0.029 (H)   06/13/2023 0.030 (H)   05/18/2023 0.035 (H)   05/18/2023 0.037 (H)   05/05/2023 <0.006   01/25/2023 <0.006     CPK (U/L)   Date Value   05/05/2023 47   01/25/2023 27   06/14/2020 23 (L)     CK " (U/L)   Date Value   04/15/2023 98     Results for orders placed or performed during the hospital encounter of 05/18/23   Vitamin D   Result Value Ref Range    Vit D, 25-Hydroxy 15 (L) 30 - 96 ng/mL     SARS-CoV2 (COVID-19) Qualitative PCR (no units)   Date Value   07/26/2023 Not Detected   06/30/2023 Not Detected   02/14/2023 Not Detected     SARS-CoV-2 RNA, Amplification, Qual (no units)   Date Value   05/28/2023 Negative   11/02/2021 Negative   01/21/2021 Negative   01/11/2021 Negative   11/21/2020 Negative   06/16/2020 Negative       Microbiology labs for the last week  Microbiology Results (last 7 days)       Procedure Component Value Units Date/Time    Urine culture [[id number]]  (Abnormal)  (Susceptibility) Collected: 07/28/23 0541    Order Status: Completed Specimen: Urine Updated: 07/31/23 1555     Urine Culture, Routine KLEBSIELLA PNEUMONIAE  10,000 - 49,999 cfu/ml        KLEBSIELLA AEROGENES  10,000 - 49,999 cfu/ml      Narrative:      Specimen Source->Urine            Reviewed and noted in plan where applicable- Please see chart for full lab data.    Lines/Drains:       Peripheral IV - Single Lumen 07/12/23 1148 20 G;1 3/4 in Left Forearm (Active)   Site Assessment Clean;Dry;Intact 07/16/23 0701   Extremity Assessment Distal to IV No abnormal discoloration 07/16/23 0701   Line Status No blood return;Flushed;Saline locked 07/16/23 0701   Dressing Status Dry;Clean;Intact 07/16/23 0701   Dressing Intervention Integrity maintained 07/16/23 0701   Dressing Change Due 07/16/23 07/16/23 0701   Site Change Due 07/16/23 07/16/23 0701   Reason Not Rotated Not due 07/16/23 0701   Number of days: 3            Peripheral IV - Single Lumen 07/14/23 1540 20 G;1 3/4 in Right Upper Arm (Active)   Site Assessment Intact;Clean;Dry 07/16/23 0701   Extremity Assessment Distal to IV No abnormal discoloration 07/16/23 0701   Line Status Blood return noted;Flushed;Saline locked 07/16/23 0701   Dressing Status Dry;Intact;Clean  "07/16/23 0701   Dressing Intervention Integrity maintained 07/16/23 0701   Dressing Change Due 07/18/23 07/16/23 0701   Site Change Due 07/18/23 07/16/23 0701   Reason Not Rotated Not due 07/16/23 0701   Number of days: 1            NG/OG Tube 07/13/23 1311 [place] sump 18 Fr. Right nostril (Active)   Placement Check placement verified by aspirate characteristics 07/16/23 0701   Tolerance no signs/symptoms of discomfort 07/16/23 0701   Securement secured to nostril center w/ adhesive device 07/16/23 0701   Clamp Status/Tolerance unclamped 07/16/23 0701   Suction Setting/Drainage Method suction at the bedside 07/16/23 0701   Insertion Site Appearance no redness, warmth, tenderness, skin breakdown, drainage 07/16/23 0701   Flush/Irrigation flushed w/;water 07/16/23 0502   Feeding Type continuous;by pump 07/16/23 0701   Feeding Action feeding continued 07/16/23 0701   Current Rate (mL/hr) 50 mL/hr 07/16/23 0701   Goal Rate (mL/hr) 50 mL/hr 07/16/23 0701   Intake (mL) 20 mL 07/15/23 0800   Water Bolus (mL) 250 mL 07/16/23 0501   Rate Formula Tube Feeding (mL/hr) 20 mL/hr 07/14/23 0400   Formula Name Isosource 1.5 07/16/23 0701   Intake (mL) - Formula Tube Feeding 50 07/16/23 0700   Residual Amount (ml) 30 ml 07/15/23 2337   Number of days: 2            Urethral Catheter 07/05/23 1332 Double-lumen (Active)   Site Assessment Clean;Intact 07/16/23 0701   Collection Container Urimeter 07/16/23 0701   Securement Method secured to top of thigh w/ adhesive device 07/16/23 0701   Catheter Care Performed yes 07/16/23 0701   Reason for Continuing Urinary Catheterization Non-healing sacral/perineal wound 07/16/23 0701   CAUTI Prevention Bundle Securement Device in place with 1" slack;Intact seal between catheter & drainage tubing;Drainage bag/urimeter off the floor;Sheeting clip in use;No dependent loops or kinks;Drainage bag/urimeter not overfilled (<2/3 full);Drainage bag/urimeter below bladder 07/16/23 0701   Output (mL) 75 mL " 07/16/23 0738   Number of days: 10            Fecal Incontinence  07/09/23 2300 (Active)   $ Fecal Management Supplies Fecal Management System (Supply) 07/14/23 1954   Application fecal incontinence  in place 07/16/23 0701   Drainage Method attached to drainage bag 07/16/23 0701   Securement to gravity 07/16/23 0701   Skin cleansed, skin barrier applied 07/16/23 0701   Tolerance no signs/symptoms of discomfort 07/16/23 0701   Stool (mL) 200 mL 07/16/23 0501   Number of days: 6       Imaging  ECG Results              EKG 12-lead (Final result)  Result time 07/05/23 13:56:05      Final result by Interface, Lab In [facility] (07/05/23 13:56:05)               Narrative:    Test Reason : E87.5,    Vent. Rate : 103 BPM     Atrial Rate : 103 BPM     P-R Int : 132 ms          QRS Dur : 076 ms      QT Int : 342 ms       P-R-T Axes : 073 054 084 degrees     QTc Int : 448 ms    Age and gender specific analysis  Sinus tachycardia  Low voltage QRS  Low anterior forces and R wave progression  Possibly acute STEMI    ACUTE MI / STEMI    Abnormal ECG  When compared with ECG of 05-JUL-2023 11:07,  No significant change was found  Confirmed by Abimael CLEMENTS MD ([id number]) on 7/5/2023 1:55:55 PM    Referred By: [email or web address]   SELF           Confirmed By:Abimael CLEMENTS MD                                   EKG 12-lead (Final result)  Result time 07/05/23 14:01:06      Final result by Interface, Lab In [facility] (07/05/23 14:01:06)               Narrative:    Test Reason : K92.2,    Vent. Rate : 096 BPM     Atrial Rate : 096 BPM     P-R Int : 114 ms          QRS Dur : 066 ms      QT Int : 352 ms       P-R-T Axes : 078 090 065 degrees     QTc Int : 444 ms    Normal sinus rhythm  Vertical axis  Otherwise normal ECG  When compared with ECG of 27-JUN-2023 12:05,  T wave inversion no longer evident in Anterior leads  Confirmed by Cameron Hodge MD ([id number]) on 7/5/2023 2:01:00 PM    Referred By: System System           Confirmed By:Cameron Hodge  MD                                  Results for orders placed during the hospital encounter of 05/28/23    Echo    Interpretation Summary  · The left ventricle is normal in size with hyperdynamic systolic function. The estimated ejection fraction is 70%.  · Normal right ventricular size with normal right ventricular systolic function.  · Normal left ventricular diastolic function.  · Mild left atrial enlargement.  · Mechanically ventilated; cannot use inferior caval vein diameter to estimate central venous pressure.  · Trivial pericardial effusion.  · There is a left pleural effusion.      X-Ray Chest AP Portable  Narrative: EXAMINATION:  XR CHEST AP PORTABLE    CLINICAL HISTORY:  Fever;    TECHNIQUE:  Single frontal view of the chest was performed.    COMPARISON:  No 07/16/2023 ne    FINDINGS:  Mild cardiomegaly.  Mild edema.  Small ill-defined opacities noted adjacent to the left hilum similar to the previous study.  Opacification at the left lung base probably atelectatic changes and  smaller amount of pleural effusion.  Impression: No significant changes    Electronically signed by: Americo Graf MD  Date:    07/25/2023  Time:    12:17      Labs, Imaging, EKG and Diagnostic results from 8/1/2023 were reviewed.    Medications:  Medication list was reviewed and changes noted under Assessment/Plan.  No current facility-administered medications on file prior to encounter.     Current Outpatient Medications on File Prior to Encounter   Medication Sig Dispense Refill    ARIPiprazole (ABILIFY) 20 MG Tab Take 5 mg by mouth once daily.      folic acid (FOLVITE) 1 MG tablet Take 1 tablet (1 mg total) by mouth once daily. (Patient taking differently: Take 1 mg by mouth every evening.) 30 tablet 2    furosemide (LASIX) 20 MG tablet Take 20 mg by mouth once daily.      lactulose (CHRONULAC) 10 gram/15 mL solution Take 10 g by mouth 3 (three) times daily.      lithium carbonate 150 MG capsule Take 1 capsule (150 mg total) by  mouth every evening. (Patient taking differently: Take 150 mg by mouth 2 (two) times a day.) 30 capsule 11    magnesium oxide (MAG-OX) 400 mg (241.3 mg magnesium) tablet Take by mouth once daily.      pantoprazole (PROTONIX) 40 MG tablet Take 40 mg by mouth once daily.      propranoloL (INDERAL) 40 MG tablet Take 40 mg by mouth once daily.      QUEtiapine (SEROQUEL) 300 MG Tab Take 100 mg by mouth every evening.      sodium bicarbonate 650 MG tablet Take 650 mg by mouth Daily.      spironolactone (ALDACTONE) 50 MG tablet Take 1 tablet (50 mg total) by mouth once daily. 90 tablet 3    zonisamide (ZONEGRAN) 100 MG Cap Take 100 mg by mouth once daily.      levETIRAcetam (KEPPRA) 1000 MG tablet Take 1,000 mg by mouth 2 (two) times daily.      OLANZapine (ZYPREXA) 10 MG tablet Take 1 tablet (10 mg total) by mouth every evening. (Patient not taking: Reported on 7/6/2023) 30 tablet 11    rifAXIMin (XIFAXAN) 550 mg Tab Take 1 tablet (550 mg total) by mouth 2 (two) times daily. (Patient not taking: Reported on 7/6/2023) 180 tablet 3     Scheduled Medications:  albuterol-ipratropium, 3 mL, Nebulization, Q6H WAKE  ciprofloxacin HCl, 500 mg, Oral, Q12H  lactulose, 30 g, Oral, TID  levetiracetam, 1,000 mg, Oral, BID  lithium, 300 mg, Oral, QHS  OLANZapine, 5 mg, Oral, QHS  pantoprazole, 40 mg, Oral, BID  rifAXImin, 550 mg, Oral, BID  sodium bicarbonate, 650 mg, Oral, BID      PRN: dextrose 10%, dextrose 10%, glucagon (human recombinant), insulin aspart U-100, oxyCODONE, sodium chloride 0.9%  Infusions:       Estimated Creatinine Clearance: 133.5 mL/min (A) (based on SCr of 0.4 mg/dL (L)).    Assessment/Plan:      * Gastrointestinal hemorrhage associated with duodenal ulcer    as  above        Urinary retention  7/27  2 episodes. s/p straight cath   7/28 Saldaña catheter placed for urinary retention         Irritant contact dermatitis due to fecal, urinary or dual incontinence  wound care eval      Dysphagia  7/18  MBS today  -started puree nectar thick liquids.    7/29 SLP recs - Mechanical soft, Thin  liquids     Hypoxia   7/16 sats 92% on 5L of NC.  CX ray - Detrimental changes since the previous examination including interval increase in pulmonary vascularity and bilateral diffuse interstitial pulmonary parenchymal opacification, progression of abnormal opacification at the left lung base, and development of small right-sided pleural effusion.   findings would be consistent with congestive heart failure.PT/OT recs SNF. BNP , procalcitonin and Echo.   7/27 Sats 97% on RA    H. pylori infection    positive serology for H pylori, begin triple therapy with clarithromycin,amoxicillin, and PPI for 14 days       Hematochezia    - GI EGD clipped duodenal ulcers for IR reference  - Transfuse blood products for active bleeding   - trend CBC and follow  - IR embolized GDA for duodenal hyperemia   -- GI re evaluated site of duodenal ulcers, no noted active bleeding  -- Continue PPI BID for 8 weeks, then once daily after that  --Pt had positive serology for H pylori, had triple therapy with clarithromycin,amoxicillin, and PPI for 14 days      Acute blood loss anemia     - Maintain IV access with 2 large bore Ivs  - Intravascular resuscitation/support with IVFs   - Hold all NSAIDs and anticoagulants, unless contraindicated.  - Please correct any coagulopathy with platelets and FFP for goal of platelets >50K and INR <2.0  - Please notify GI team if there is significant change in patient's clinical status  - To date, patient has required at least 21 units of pRBCs to maintain Hgb >7     7/16     Patient's with macrocytic anemia.. Hemoglobin stable. Etiology likely due to acute blood loss.  Current CBC reviewed-    Recent Labs   Lab 07/29/23  0346 07/30/23  0557 07/31/23  0444   HGB 8.4* 8.2* 8.8*         Component Value Date/Time     (H) 07/31/2023 0444    RDW SEE COMMENT 07/31/2023 0444    IRON 132 05/18/2023 1527    FERRITIN 110 05/18/2023  1527    RETIC 3.9 (H) 06/05/2023 0935    FOLATE 11.5 07/17/2023 0619    [id number] 07/17/2023 0619    OCCULTBLOOD Negative 09/19/2015 0137     Monitor CBC and transfuse if H/H drops below 7/21.        Retroperitoneal bleed  Retroperitoneum: No significant adenopathy.  Similar sized left retroperitoneal hematoma measuring 11 x 9 cm (series 5, image 100; previously 11 x 9 cm).  The left iliacus collection also measures similar to prior at 5.4 cm (series 5, image 127; previously 5.6 cm).  Layering hyperdensity within these collections suggesting different ages in blood products.  No significant volume active extravasation into these collections  - CT-A demonstrated stable retroperitoneal hematoma. No need for intervention at this time.          Supratherapeutic INR  patient with INR   Recent Labs   Lab 07/23/23  0434   INR 1.5*   . INR is supratherapeutic. secondary to coagulopathy from liver disease.monitor      Hypokalemia  replaced       UTI (urinary tract infection)  7/17 Saldaña removed. UA +. UC pending. started on IV ceftriaxone.  7/18 UC -YEAST 10,000 - 49,999 cfu/ml Identification pending No other significant isolate  7/28 continues with confusion. UA + . UC pending. continue ceftriaxone. no fistula found on sacral exam with wound care.   7/29   UC - GRAM NEGATIVE BILLY 10,000 - 49,999 cfu/ml  Identification and susceptibility pending     7/31 UC with klebsialla penumoniae and Aerogenes resisant to ceftriaxone. started on ciprofloxacin .    Bipolar 1 disorder     - Consult psych regarding Bipolar 1 disorder history  7/27 Sister wanted her started back on lithium but psychiatry recommended  increasing Zyprexa 7.5mg QHS. EKG to monitor Qtc.Discontinue zyprexa if Qtc >480.  EKG QTC 493ms.  Zyprexa swtiched to 5mg HS  7/28  started  lithium 300 mg  nightly due to concern for manic episode       Hepatic encephalopathy   ammonia 190s on admit -->90s . AAOX 1. no lactulose give due to GI bleed  repeat ammonia.  Hepatolog eval  with     MELD 3.0: 19 at 7/25/2023  2:48 AM  MELD-Na: 15 at 7/25/2023  2:48 AM  Calculated from:  Serum Creatinine: 0.3 mg/dL (Using min of 1 mg/dL) at 7/25/2023  2:48 AM  Serum Sodium: 143 mmol/L (Using max of 137 mmol/L) at 7/25/2023  2:48 AM  Total Bilirubin: 3.1 mg/dL at 7/25/2023  2:48 AM  Serum Albumin: 1.8 g/dL at 7/25/2023  2:48 AM  INR(ratio): 1.5 at 7/23/2023  4:34 AM  Age at listing (hypothetical): 58 years  Sex: Female at 7/25/2023  2:48 AM  7/16 Ammonia 48 WNL. AAOX 1. Hepatology consulted for cirrhosis.    7/17  Hepatology eval. resumed lactulose.  7/18  hydroxyzine  PRN  discontinued  due to it not being safe in delirium  7/29 ammonia elevated at 70. lactulose increased to 30g TID.  started rifaximin BID     Severe protein-calorie malnutrition  Nutrition consulted. Most recent weight and BMI monitored-     Measurements:  Wt Readings from Last 1 Encounters:   07/27/23 61.2 kg (135 lb)   Body mass index is 24.69 kg/m².    Patient has been screened and assessed by RD.    Malnutrition Type:  Context: social/environmental circumstances  Level: severe    Malnutrition Characteristic Summary:  Energy Intake (Malnutrition): less than or equal to 75% for greater than or equal to 1 month  Subcutaneous Fat (Malnutrition): severe depletion  Muscle Mass (Malnutrition): severe depletion      History of seizure  on Keppra       Thrombocytopenia  with cirrhosis   Patient with thrombocytopenia   Recent Labs   Lab 07/29/23  0346 07/30/23  0557 07/31/23  0444   PLT 61* 55* 58*   . Platelet counts stable  .monitor      Cirrhosis, Laennec's  alcoholic cirrhosis  MELD 3.0: 19 at 7/25/2023  2:48 AM  MELD-Na: 15 at 7/25/2023  2:48 AM  Calculated from:  Serum Creatinine: 0.3 mg/dL (Using min of 1 mg/dL) at 7/25/2023  2:48 AM  Serum Sodium: 143 mmol/L (Using max of 137 mmol/L) at 7/25/2023  2:48 AM  Total Bilirubin: 3.1 mg/dL at 7/25/2023  2:48 AM  Serum Albumin: 1.8 g/dL at 7/25/2023  2:48 AM  INR(ratio): 1.5  at 7/23/2023  4:34 AM  Age at listing (hypothetical): 58 years  Sex: Female at 7/25/2023  2:48 AM     No results for input(s): AMMONIA in the last 168 hours.  Strict input /output monitor, daily weights        VTE Risk Mitigation (From admission, onward)           Ordered     Place sequential compression device  Until discontinued         07/14/23 1024     Reason for No Pharmacological VTE Prophylaxis  Once        Question:  Reasons:  Answer:  Active Bleeding    07/05/23 1334     IP VTE HIGH RISK PATIENT  Once         07/05/23 1334                    Discharge Planning   BRAYAN: 8/3/2023     Code Status: DNR   Is the patient medically ready for discharge?: No    Reason for patient still in hospital (select all that apply): Treatment  Discharge Plan A: Skilled Nursing Facility   Discharge Delays: None known at this time              Seth Noyola MD  Department of Hospital Medicine   [facility] - Telemetry Stepdown

## 2023-08-01 NOTE — PLAN OF CARE
[facility] - Tele[facility] Stepdown  Discharge Reassessment    Primary Care Provider: Viktor Ross MD    Expected Discharge Date: 8/3/2023    Reassessment (most recent)       Discharge Reassessment - 08/01/23 1620          Discharge Reassessment    Assessment Type Discharge Planning Reassessment     Did the patient's condition or plan change since previous assessment? No     Discharge Plan discussed with: Sibling     Communicated BRAYAN with patient/caregiver Yes     Discharge Plan A Skilled Nursing Facility     Discharge Plan B Rehab     DME Needed Upon Discharge  none     Transition of Care Barriers None     Why the patient remains in the hospital Requires continued medical care        Post-Acute Status    Post-Acute Authorization Placement     Post-Acute Placement Status Pending medical clearance/testing   & updated insurance auth    Discharge Delays None known at this time                 [facility] in admissions, they can accept patient on lithium    EMMANUEL HagenW  [facility]  Ext. [phone]

## 2023-08-01 NOTE — PLAN OF CARE
Problem: Infection  Goal: Absence of Infection Signs and Symptoms  Outcome: Ongoing, Progressing     Problem: Adult Inpatient Plan of Care  Goal: Plan of Care Review  Outcome: Ongoing, Progressing  Goal: Patient-Specific Goal (Individualized)  Outcome: Ongoing, Progressing  Goal: Absence of Hospital-Acquired Illness or Injury  Outcome: Ongoing, Progressing  Goal: Optimal Comfort and Wellbeing  Outcome: Ongoing, Progressing    Patient alert and oriented x 4. No distress noted at this time. No fall this shift. Call light within reach. Will continue to follow.

## 2023-08-01 NOTE — PT/OT/SLP PROGRESS
"Occupational Therapy  Co- Treatment  Co-treatment with PT performed for patient safety, education, and facilitation of treatment to maximize activity tolerance and progression towards goals from two skilled therapy disciplines.    Name: Marely Hamilton  MRN: [id number]  Admitting Diagnosis:  Gastrointestinal hemorrhage associated with duodenal ulcer  21 Days Post-Op    Recommendations:     Discharge Recommendations: nursing facility, skilled  Discharge Equipment Recommendations:  to be determined by next level of care  Barriers to discharge:  Inaccessible home environment, Decreased caregiver support    Assessment:     Marely Hamilton is a 58 y.o. female with a medical diagnosis of Gastrointestinal hemorrhage associated with duodenal ulcer.  She presents agreeable to EOB activity and completed dynamic sitting balance activity (self-feeding) with minimal assistance for activity and balance. Performance deficits affecting function are weakness, impaired endurance, impaired self care skills, impaired functional mobility, impaired cardiopulmonary response to activity, decreased lower extremity function, decreased upper extremity function, decreased safety awareness, edema, impaired cognition, decreased coordination.     Rehab Prognosis:  Good; patient would benefit from acute skilled OT services to address these deficits and reach maximum level of function.       Plan:     Patient to be seen 3 x/week to address the above listed problems via self-care/home management, therapeutic activities, therapeutic exercises, neuromuscular re-education  Plan of Care Expires: 08/11/23  Plan of Care Reviewed with: patient    Subjective     Chief Complaint: difficulty self-feeding  Patient/Family Comments/goals: "I'm not standing with you"  Pain/Comfort:  Pain Rating 1: 0/10  Pain Rating Post-Intervention 1: 0/10    Objective:     Communicated with: nursing prior to session.  Patient found HOB elevated with baugh catheter, telemetry upon " OT entry to room.    General Precautions: Standard, fall, aspiration    Orthopedic Precautions:N/A  Braces: N/A  Respiratory Status: Nasal cannula, flow 2 L/min     Occupational Performance:     Bed Mobility:    Patient completed Supine to Sit with maximal assistance and 2 persons  Patient completed Sit to Supine with maximal assistance; assistance with LE only due to patient's intense desire to return to bed  Patient sat EOB with overall minimal assistance, progressing to contact guard assistance at times during session    Functional Mobility/Transfers:  Patient completed Sit <> Stand Transfer with total assistance  with  no assistive device     Activities of Daily Living:  Feeding:  minimum assistance to self-feed; patient required assistance with scooping mechanical soft food with R hand    AMPAC 6 Click ADL: 11    Treatment & Education:  Noted edema in R hand and encouraging hand fine motor exercises to reduce edema. Patient completed 5 hand pumps during session.     Patient educated on:   -purpose of OT and OT POC  -facilitation and education on proper body mechanics, energy conservation, and safety  -importance of early mobility and out of bed activities with staff assist  -overall benefits of therapy     All questions answered within OT scope and to patient's satisfaction    Patient left HOB elevated with all lines intact, call button in reach, and nursing notified    GOALS:   Multidisciplinary Problems       Occupational Therapy Goals          Problem: Occupational Therapy    Goal Priority Disciplines Outcome Interventions   Occupational Therapy Goal     OT, PT/OT Ongoing, Progressing    Description: Goals to be met by: 7/28/2023     Patient will increase functional independence with ADLs by performing:    UE Dressing with Minimal Assistance.  LE Dressing with Moderate Assistance.  Grooming while EOB with Minimal Assistance.  Toileting from bedside commode with Maximum Assistance for hygiene and clothing  management.   Supine to sit with Moderate Assistance.  Stand pivot transfers with Maximum Assistance with or without AD.  Toilet transfer to bedside commode with Maximum Assistance with or without AD.                         Time Tracking:     OT Date of Treatment: 08/01/23  OT Start Time: 1347  OT Stop Time: 1402  OT Total Time (min): 15 min    Billable Minutes:Self Care/Home Management 15    OT/DC: OT          8/1/2023

## 2023-08-02 LAB
ALBUMIN SERPL BCP-MCNC: 1.8 G/DL (ref 3.5–5.2)
ALP SERPL-CCNC: 130 U/L (ref 55–135)
ALT SERPL W/O P-5'-P-CCNC: 11 U/L (ref 10–44)
ANION GAP SERPL CALC-SCNC: 8 MMOL/L (ref 8–16)
APTT PPP: 42.4 SEC (ref 21–32)
AST SERPL-CCNC: 32 U/L (ref 10–40)
BASOPHILS # BLD AUTO: 0.02 K/UL (ref 0–0.2)
BASOPHILS NFR BLD: 0.8 % (ref 0–1.9)
BILIRUB SERPL-MCNC: 3.2 MG/DL (ref 0.1–1)
BUN SERPL-MCNC: <3 MG/DL (ref 6–20)
CA-I BLDV-SCNC: 0.9 MMOL/L (ref 1.06–1.42)
CA-I BLDV-SCNC: 1.22 MMOL/L (ref 1.06–1.42)
CALCIUM SERPL-MCNC: 7.5 MG/DL (ref 8.7–10.5)
CHLORIDE SERPL-SCNC: 114 MMOL/L (ref 95–110)
CO2 SERPL-SCNC: 18 MMOL/L (ref 23–29)
CREAT SERPL-MCNC: 0.4 MG/DL (ref 0.5–1.4)
DIFFERENTIAL METHOD: ABNORMAL
EOSINOPHIL # BLD AUTO: 0.2 K/UL (ref 0–0.5)
EOSINOPHIL NFR BLD: 6.4 % (ref 0–8)
ERYTHROCYTE [DISTWIDTH] IN BLOOD BY AUTOMATED COUNT: 23.4 % (ref 11.5–14.5)
EST. GFR  (NO RACE VARIABLE): >60 ML/MIN/1.73 M^2
GLUCOSE SERPL-MCNC: 101 MG/DL (ref 70–110)
HCT VFR BLD AUTO: 28.9 % (ref 37–48.5)
HGB BLD-MCNC: 8.7 G/DL (ref 12–16)
IMM GRANULOCYTES # BLD AUTO: 0.01 K/UL (ref 0–0.04)
IMM GRANULOCYTES NFR BLD AUTO: 0.4 % (ref 0–0.5)
LITHIUM SERPL-SCNC: 0.1 MMOL/L (ref 0.6–1.2)
LYMPHOCYTES # BLD AUTO: 0.6 K/UL (ref 1–4.8)
LYMPHOCYTES NFR BLD: 24.4 % (ref 18–48)
MAGNESIUM SERPL-MCNC: 1.6 MG/DL (ref 1.6–2.6)
MCH RBC QN AUTO: 32 PG (ref 27–31)
MCHC RBC AUTO-ENTMCNC: 30.1 G/DL (ref 32–36)
MCV RBC AUTO: 106 FL (ref 82–98)
MONOCYTES # BLD AUTO: 0.3 K/UL (ref 0.3–1)
MONOCYTES NFR BLD: 10 % (ref 4–15)
NEUTROPHILS # BLD AUTO: 1.5 K/UL (ref 1.8–7.7)
NEUTROPHILS NFR BLD: 58 % (ref 38–73)
NRBC BLD-RTO: 0 /100 WBC
PLATELET # BLD AUTO: 60 K/UL (ref 150–450)
PMV BLD AUTO: 10.9 FL (ref 9.2–12.9)
POCT GLUCOSE: 101 MG/DL (ref 70–110)
POCT GLUCOSE: 118 MG/DL (ref 70–110)
POCT GLUCOSE: 131 MG/DL (ref 70–110)
POTASSIUM SERPL-SCNC: 4.1 MMOL/L (ref 3.5–5.1)
PROT SERPL-MCNC: 4.7 G/DL (ref 6–8.4)
RBC # BLD AUTO: 2.72 M/UL (ref 4–5.4)
SODIUM SERPL-SCNC: 140 MMOL/L (ref 136–145)
WBC # BLD AUTO: 2.5 K/UL (ref 3.9–12.7)

## 2023-08-02 PROCEDURE — 99233 PR SUBSEQUENT HOSPITAL CARE,LEVL III: ICD-10-PCS | Mod: ,,, | Performed by: HOSPITALIST

## 2023-08-02 PROCEDURE — 83735 ASSAY OF MAGNESIUM: CPT

## 2023-08-02 PROCEDURE — 97530 THERAPEUTIC ACTIVITIES: CPT

## 2023-08-02 PROCEDURE — 99233 SBSQ HOSP IP/OBS HIGH 50: CPT | Mod: ,,, | Performed by: HOSPITALIST

## 2023-08-02 PROCEDURE — 80053 COMPREHEN METABOLIC PANEL: CPT

## 2023-08-02 PROCEDURE — 94640 AIRWAY INHALATION TREATMENT: CPT

## 2023-08-02 PROCEDURE — 27000221 HC OXYGEN, UP TO 24 HOURS

## 2023-08-02 PROCEDURE — 99232 PR SUBSEQUENT HOSPITAL CARE,LEVL II: ICD-10-PCS | Mod: GC,,, | Performed by: PSYCHIATRY & NEUROLOGY

## 2023-08-02 PROCEDURE — 85730 THROMBOPLASTIN TIME PARTIAL: CPT

## 2023-08-02 PROCEDURE — 25000242 PHARM REV CODE 250 ALT 637 W/ HCPCS: Performed by: HOSPITALIST

## 2023-08-02 PROCEDURE — 82330 ASSAY OF CALCIUM: CPT | Mod: 91

## 2023-08-02 PROCEDURE — 99900035 HC TECH TIME PER 15 MIN (STAT)

## 2023-08-02 PROCEDURE — 94761 N-INVAS EAR/PLS OXIMETRY MLT: CPT

## 2023-08-02 PROCEDURE — 80178 ASSAY OF LITHIUM: CPT

## 2023-08-02 PROCEDURE — 85025 COMPLETE CBC W/AUTO DIFF WBC: CPT

## 2023-08-02 PROCEDURE — 20600001 HC STEP DOWN PRIVATE ROOM

## 2023-08-02 PROCEDURE — 99232 SBSQ HOSP IP/OBS MODERATE 35: CPT | Mod: GC,,, | Performed by: PSYCHIATRY & NEUROLOGY

## 2023-08-02 PROCEDURE — 25000003 PHARM REV CODE 250: Performed by: HOSPITALIST

## 2023-08-02 RX ADMIN — CIPROFLOXACIN HYDROCHLORIDE 500 MG: 500 TABLET, FILM COATED ORAL at 08:08

## 2023-08-02 RX ADMIN — LEVETIRACETAM 1000 MG: 500 SOLUTION ORAL at 08:08

## 2023-08-02 RX ADMIN — LEVETIRACETAM 1000 MG: 500 SOLUTION ORAL at 09:08

## 2023-08-02 RX ADMIN — IPRATROPIUM BROMIDE AND ALBUTEROL SULFATE 3 ML: 2.5; .5 SOLUTION RESPIRATORY (INHALATION) at 08:08

## 2023-08-02 RX ADMIN — OLANZAPINE 5 MG: 5 TABLET, FILM COATED ORAL at 08:08

## 2023-08-02 RX ADMIN — CIPROFLOXACIN HYDROCHLORIDE 500 MG: 500 TABLET, FILM COATED ORAL at 09:08

## 2023-08-02 RX ADMIN — SODIUM BICARBONATE 650 MG TABLET 650 MG: at 09:08

## 2023-08-02 RX ADMIN — PANTOPRAZOLE SODIUM 40 MG: 40 GRANULE, DELAYED RELEASE ORAL at 09:08

## 2023-08-02 RX ADMIN — LACTULOSE 30 G: 20 SOLUTION ORAL at 09:08

## 2023-08-02 RX ADMIN — PANTOPRAZOLE SODIUM 40 MG: 40 GRANULE, DELAYED RELEASE ORAL at 08:08

## 2023-08-02 RX ADMIN — RIFAXIMIN 550 MG: 550 TABLET ORAL at 09:08

## 2023-08-02 RX ADMIN — LITHIUM CARBONATE 300 MG: 300 TABLET, FILM COATED, EXTENDED RELEASE ORAL at 08:08

## 2023-08-02 RX ADMIN — LACTULOSE 30 G: 20 SOLUTION ORAL at 08:08

## 2023-08-02 RX ADMIN — IPRATROPIUM BROMIDE AND ALBUTEROL SULFATE 3 ML: 2.5; .5 SOLUTION RESPIRATORY (INHALATION) at 09:08

## 2023-08-02 RX ADMIN — RIFAXIMIN 550 MG: 550 TABLET ORAL at 08:08

## 2023-08-02 RX ADMIN — IPRATROPIUM BROMIDE AND ALBUTEROL SULFATE 3 ML: 2.5; .5 SOLUTION RESPIRATORY (INHALATION) at 02:08

## 2023-08-02 RX ADMIN — SODIUM BICARBONATE 650 MG TABLET 650 MG: at 08:08

## 2023-08-02 NOTE — MED STUDENT SUBJECTIVE & OBJECTIVE
CONSULTATION LIAISON PSYCHIATRY PROGRESS NOTE    Patient Name: Marely Hamilton  MRN: [id number]  Patient Class: IP- Inpatient  Admission Date: 7/5/2023  Attending Physician: Seth Noyola MD      SUBJECTIVE:   Marely Hamilton is a 58 y.o. female with past psychiatric history of *** & past pertinent medical history of *** presents to the ED/admitted to the hospital for Gastrointestinal hemorrhage associated with duodenal ulcer    Psychiatry consulted for ***    Interval history:  Patient states she has been eating well and was able to sleep 6 hours overnight. During the interview, patient was easily distracted and starting reading things from TV and made sentences that did not make sense.        OBJECTIVE:    General Appearance: dressed in hospital garb, appears older than stated age, thin and gaunt  Behavior: cooperative, friendly  Involuntary Movements and Motor Activity: no tremors  Gait and Station: unable to assess - patient lying down or seated  Speech and Language: fluent English, soft, monotone, talkative  Mood: Unable to assess  Affect: calm  Thought Process and Associations: bizarre, tangential, +loosening of associations  Thought Content and Perceptions:: + delusions  Sensorium and Orientation: oriented to person and place and year  Recent and Remote Memory:  impaired due to mental status  Attention and Concentration: attentive to conversation, easily distractible      ASSESSMENT & RECOMMENDATIONS   (Please list each relevant SPECIFIC psychiatric DSMV or medical diagnosis and recs for it under the listing DO NOT WRITE AN IMPRESSION)    PSYCH MEDICATIONS  Scheduled  PRN  None needed    RISK ASSESSMENT  NEEDS PEC for SI/HI or grave disability & NEEDS 1:1 sitter  NO NEED FOR PEC vs. UNCONTESTED    FOLLOW UP  Will follow up while in house  Will sign off. Patient can follow up with ***/outpatient mental health provider. Resources provided in patient's discharge instructions.    DISPOSITION - once medically  cleared:  Seek involuntary inpatient psychiatric admission for stabilization of acute psychiatric symptoms and a safe disposition plan is enacted. The patient &/or their family was informed that the patient will be transferred to an inpatient unit per ED placement team.   OR  Patient may be discharged home with next of kin with outpatient psychaitric follow up/rehab.   OR  Defer to medical team    Please contact ON CALL psychiatry service (24/7) for any acute issues that may arise.    Dr. Ousmane Rivas   Psychiatry  [facility]  8/2/2023 10:28 AM        --------------------------------------------------------------------------------------------------------------------------------------------------------------------------------------------------------------------------------------    CONTINUED OBJECTIVE clinical data & findings reviewed and noted for above decision making    Current Medications:   Scheduled Meds:    albuterol-ipratropium  3 mL Nebulization Q6H WAKE    ciprofloxacin HCl  500 mg Oral Q12H    lactulose  30 g Oral TID    levetiracetam  1,000 mg Oral BID    lithium  300 mg Oral QHS    OLANZapine  5 mg Oral QHS    pantoprazole  40 mg Oral BID    rifAXImin  550 mg Oral BID    sodium bicarbonate  650 mg Oral BID     PRN Meds: dextrose 10%, dextrose 10%, glucagon (human recombinant), insulin aspart U-100, oxyCODONE, sodium chloride 0.9%    Allergies:   Review of patient's allergies indicates:   Allergen Reactions    Sulfa (sulfonamide antibiotics) Rash    Codeine Nausea And Vomiting       Vitals  Vitals:    08/02/23 0945   BP:    Pulse: 79   Resp: 18   Temp:        Labs/Imaging/Studies:  Recent Results (from the past 24 hour(s))   POCT glucose    Collection Time: 08/01/23 11:45 AM   Result Value Ref Range    POCT Glucose 149 (H) 70 - 110 mg/dL   Calcium, ionized    Collection Time: 08/01/23  3:51 PM   Result Value Ref Range    Ionized Calcium 1.24 1.06 - 1.42 mmol/L   POCT glucose     Collection Time: 08/02/23 12:34 AM   Result Value Ref Range    POCT Glucose 131 (H) 70 - 110 mg/dL   APTT    Collection Time: 08/02/23  5:04 AM   Result Value Ref Range    aPTT 42.4 (H) 21.0 - 32.0 sec   Magnesium    Collection Time: 08/02/23  5:04 AM   Result Value Ref Range    Magnesium 1.6 1.6 - 2.6 mg/dL   Calcium, ionized    Collection Time: 08/02/23  5:04 AM   Result Value Ref Range    Ionized Calcium 1.22 1.06 - 1.42 mmol/L   CBC Auto Differential    Collection Time: 08/02/23  5:04 AM   Result Value Ref Range    WBC 2.50 (L) 3.90 - 12.70 K/uL    RBC 2.72 (L) 4.00 - 5.40 M/uL    Hemoglobin 8.7 (L) 12.0 - 16.0 g/dL    Hematocrit 28.9 (L) 37.0 - 48.5 %     (H) 82 - 98 fL    MCH 32.0 (H) 27.0 - 31.0 pg    MCHC 30.1 (L) 32.0 - 36.0 g/dL    RDW 23.4 (H) 11.5 - 14.5 %    Platelets 60 (L) 150 - 450 K/uL    MPV 10.9 9.2 - 12.9 fL    Immature Granulocytes 0.4 0.0 - 0.5 %    Gran # (ANC) 1.5 (L) 1.8 - 7.7 K/uL    Immature Grans (Abs) 0.01 0.00 - 0.04 K/uL    Lymph # 0.6 (L) 1.0 - 4.8 K/uL    Mono # 0.3 0.3 - 1.0 K/uL    Eos # 0.2 0.0 - 0.5 K/uL    Baso # 0.02 0.00 - 0.20 K/uL    nRBC 0 0 /100 WBC    Gran % 58.0 38.0 - 73.0 %    Lymph % 24.4 18.0 - 48.0 %    Mono % 10.0 4.0 - 15.0 %    Eosinophil % 6.4 0.0 - 8.0 %    Basophil % 0.8 0.0 - 1.9 %    Differential Method Automated    Comprehensive Metabolic Panel    Collection Time: 08/02/23  5:04 AM   Result Value Ref Range    Sodium 140 136 - 145 mmol/L    Potassium 4.1 3.5 - 5.1 mmol/L    Chloride 114 (H) 95 - 110 mmol/L    CO2 18 (L) 23 - 29 mmol/L    Glucose 101 70 - 110 mg/dL    BUN <3 (L) 6 - 20 mg/dL    Creatinine 0.4 (L) 0.5 - 1.4 mg/dL    Calcium 7.5 (L) 8.7 - 10.5 mg/dL    Total Protein 4.7 (L) 6.0 - 8.4 g/dL    Albumin 1.8 (L) 3.5 - 5.2 g/dL    Total Bilirubin 3.2 (H) 0.1 - 1.0 mg/dL    Alkaline Phosphatase 130 55 - 135 U/L    AST 32 10 - 40 U/L    ALT 11 10 - 44 U/L    eGFR >60.0 >60 mL/min/1.73 m^2    Anion Gap 8 8 - 16 mmol/L   POCT glucose    Collection  Time: 08/02/23  5:33 AM   Result Value Ref Range    POCT Glucose 118 (H) 70 - 110 mg/dL     Imaging Results              CTA Acute GI [place], Abdomen and Pelvis (Final result)  Result time 07/05/23 17:15:19      Final result by Mumtaz Garsia MD (07/05/23 17:15:19)                   Impression:      Images are degraded by significant streak and motion artifacts.    Stable large left retroperitoneal hematoma and left iliacus hematoma.  No contrast extravasation within the hematomas or bowel to indicate active arterial bleed.    Hepatic cirrhosis.  Diffuse wall thickening of the stomach and colon, which can be seen in the setting of hepatic dysfunction.  However, superimposed inflammatory processes are not excluded.    Multiple, nondilated, distended fluid-filled loops of small bowel without a definite transition point.  Stool and air seen within the colon, this is suggestive of ileus.    Small left pleural effusion with associated compressive atelectasis.    Cholelithiasis.    Cardiomegaly.    Electronically signed by resident: Emiliano Mcmahon  Date:    07/05/2023  Time:    16:29    Electronically signed by: Mumtaz Garsia MD  Date:    07/05/2023  Time:    17:15               Narrative:    EXAMINATION:  CTA ACUTE GI BLEED, ABDOMEN AND PELVIS    CLINICAL HISTORY:  GI bleed;    TECHNIQUE:  Initial noncontrast  images were obtained of the abdomen and pelvis.  CT axial angiography images were then obtained from the lung bases to the pubic symphysis following the intravenous administration of 100 of Omnipaque 350 with delayed images obtained per GI bleeding protocol.  Sagittal and coronal reformats were provided.    COMPARISON:  CTA GI bleed 06/13/2023    FINDINGS:  Images are degraded by significant streak and motion artifacts.    Lungs: Interval decrease in size of the small right pleural effusion with associated compressive atelectasis.  Resolution of the left pleural effusion.    Heart: Enlarged.  No  pericardial effusion.    Liver: Nodular contour of the liver.  No focal abnormality.    Gallbladder: Multiple calcified gallstones.  No pericholecystic inflammatory changes.    Bile ducts: No intrahepatic or extrahepatic biliary ductal dilatation.    Spleen: Normal size.    Pancreas: No mass. No ductal dilatation. No peripancreatic fat stranding.    Adrenals: No significant abnormality    Renal/Ureters: Bilateral cortical thinning.  No hydronephrosis.  Proximal ureters are normal caliber.  The distal ureters are difficult to evaluate due to streak artifact.  Bladder is decompressed with Saldaña catheter in place.    Reproductive: Uterus appears without significant abnormality.  No adnexal mass, noting limited evaluation due to significant streak artifact.    Stomach/Bowel: Stomach is distended with ingested contents with thickened rugal folds.  Small bowel is distended with multiple nondilated fluid-filled loops.  No transition point.  Appendix is normal.  Diffuse wall thickening throughout the colon with mild stool burden.  Diffuse wall thickening of the rectum.  No contrast extravasation to indicate active arterial bleed.    Peritoneum: Stable large left retroperitoneal hematoma measuring 11.5 x 7.8 x 12.6 cm.  No contrast extravasation to indicate active bleed within the hematoma.  Additional smaller hematoma anterior to the left iliacus muscle, which appears stable compared to prior CTA.  No free fluid. No intraperitoneal free air.    Lymph Nodes: Multiple prominent mesenteric and retroperitoneal lymph nodes.    Vasculature: Abdominal aorta tapers normally.  Mild atherosclerosis of the abdominal aorta and its branches.    Bones: Bony mineralization is diminished.  Left hip arthroplasty.  Generalized body wall edema.    Soft Tissues: No significant abnormality.                                       CT Head Without Contrast (Final result)  Result time 07/05/23 15:10:53      Final result by Viktor Desouza MD  (07/05/23 15:10:53)                   Impression:      No evidence of acute intracranial pathology.    Volume loss again identified.    Electronically signed by resident: Kenney Rosas  Date:    07/05/2023  Time:    14:45    Electronically signed by: Viktor Desouza  Date:    07/05/2023  Time:    15:10               Narrative:    EXAMINATION:  CT HEAD WITHOUT CONTRAST    CLINICAL HISTORY:  Mental status change, unknown cause;    TECHNIQUE:  Low dose axial CT images obtained throughout the head without the use of intravenous contrast.  Axial, sagittal and coronal reconstructions were performed.    COMPARISON:  CT head 06/22/2023    FINDINGS:  Intracranial compartment:    Volume loss without evidence of hydrocephalus.    No parenchymal  hemorrhage, edema, mass effect or major vascular distribution infarct.    Decreased attenuation in the periventricular white matter is nonspecific but may reflect mild chronic small vessel ischemic change vs other encephalopathy.    No extra-axial blood or fluid collections.    Skull/extracranial contents (limited evaluation):    No displaced calvarial fracture.    The visualized sinuses and mastoid air cells are clear.                                       X-Ray Chest AP Portable (Final result)  Result time 07/05/23 13:09:11      Final result by Americo Reyes MD (07/05/23 13:09:11)                   Impression:      No significant detrimental interval change in the appearance of the chest since 06/25/2023 is appreciated, with significant improvement as noted above.  No post procedure pneumothorax.      Electronically signed by: Americo Reyes MD  Date:    07/05/2023  Time:    13:09               Narrative:    EXAMINATION:  XR CHEST AP PORTABLE    TECHNIQUE:  One view was obtained.  Note is made of the fact that the thorax is obscured to some extent by opacities external to the patient, particularly defibrillator pads.    COMPARISON:  Comparison is made to the most recent prior chest  radiograph of 06/25/2023.    FINDINGS:  Endotracheal tube has been placed, its tip lying just superior to the apex of the aortic arch, well above the katharina.  Left jugular origin vascular catheter is now seen, its tip in the superior vena cava near the junction of the right and left brachiocephalic veins.  Allowing for magnification of the cardiomediastinal silhouette related to projection, the heart is not significantly enlarged.  Opacity in both inferior hemithoraces seen on the previous examination has resolved, consistent with clearing of airspace consolidation in both mid/lower lung zones and resolution of previously present bilateral pleural fluid.  Lung zones are currently clear and free of significant airspace consolidation or volume loss.  No pleural fluid of any substantial volume is seen on either side.  No pneumothorax.

## 2023-08-02 NOTE — PT/OT/SLP PROGRESS
"Physical Therapy Treatment    Patient Name:  Marely Hamilton   MRN:  [id number]    Recommendations:     Discharge Recommendations: nursing facility, skilled  Discharge Equipment Recommendations: to be determined by next level of care  Barriers to discharge: None    Assessment:     Marely Hamilton is a 58 y.o. female admitted with a medical diagnosis of Gastrointestinal hemorrhage associated with duodenal ulcer.  She presents with the following impairments/functional limitations: weakness, impaired endurance, impaired self care skills, impaired functional mobility, gait instability, impaired balance, impaired cognition, decreased coordination, decreased upper extremity function, decreased lower extremity function. Patient continues to greatly be limited by cognitive status impacting mobility progression. At present visit, patient became agitated and aggressive during attempted supine to sit, grabbed therapist's forearm with both hands attempting to twist therapist's wrist, then proceeded to pull at therapist's glove. Patient was able to instantly be subdued with conversation and redirection. Patient declined further mobility attempts and was left up in bed eating lunch with a pleasant affect. Continue to recommend discharge to skilled nursing facility if patient remains participatory during subsequent therapy visits.      Rehab Prognosis: Fair; patient would benefit from acute skilled PT services to address these deficits and reach maximum level of function.    Recent Surgery: Procedure(s) (LRB):  EGD (ESOPHAGOGASTRODUODENOSCOPY) (N/A) 22 Days Post-Op    Plan:     During this hospitalization, patient to be seen 3 x/week to address the identified rehab impairments via therapeutic activities, therapeutic exercises, neuromuscular re-education and progress toward the following goals:    Plan of Care Expires:  08/10/23    Subjective     Chief Complaint: Nasal canula discomfort   Patient/Family Comments/goals: "I'm feeling " "better!" Patient reports she is ready for therapy.   Pain/Comfort:  Pain Rating 1: 10/10  Location - Side 1: Bilateral  Location - Orientation 1: medial  Location 1: nose (discomfort from NC)  Pain Addressed 1: Distraction, Reposition  Pain Rating Post-Intervention 1: 0/10      Objective:     Communicated with RN prior to session.  Patient found HOB elevated with baugh catheter, telemetry, oxygen upon PT entry to room.     General Precautions: Standard, fall  Orthopedic Precautions: N/A  Braces: N/A  Respiratory Status: Nasal cannula, flow 2 L/min     Functional Mobility:  Bed Mobility     Rolling Right: minimum assistance at LE and trunk. Verbal and tactile cues for sequencing.     Cognition:   Affect: agitated, aggressive, pleasant, cooperative  Attention: attending to task without distraction   Command Following: follows 1 step commands 50% of the time  Communication:     AM-PAC 6 CLICK MOBILITY  Turning over in bed (including adjusting bedclothes, sheets and blankets)?: 2  Sitting down on and standing up from a chair with arms (e.g., wheelchair, bedside commode, etc.): 1  Moving from lying on back to sitting on the side of the bed?: 2  Moving to and from a bed to a chair (including a wheelchair)?: 1  Need to walk in hospital room?: 1  Climbing 3-5 steps with a railing?: 1  Basic Mobility Total Score: 8       Treatment & Education:  Education: patient educated on POC, need for therapy, safety with mobility, discharge recommendations and equipment recommendations. Patient verbalized understanding of all topics.     Patient left with bed in chair position with all lines intact, call button in reach, and RN notified. Set up patient's lunch tray per patient request.     GOALS:   Multidisciplinary Problems       Physical Therapy Goals          Problem: Physical Therapy    Goal Priority Disciplines Outcome Goal Variances Interventions   Physical Therapy Goal     PT, PT/OT Ongoing, Progressing     Description: Goals to " be met by: 8/10/23     Patient will increase functional independence with mobility by performin. Supine to sit with Moderate Assistance  2. Rolling to Left  with Minimal Assistance.  3. Sit to stand transfer with Moderate Assistance  4. Bed to chair transfer with Maximum Assistance   5. Sitting at edge of bed x10 minutes with Contact Guard Assistance to perform functional activities.   6. Lower extremity exercise program x10 reps per handout, with assistance as needed to improve strength for functional activities.                          Time Tracking:     PT Received On: 23  PT Start Time: 1246     PT Stop Time: 1304  PT Total Time (min): 18 min     Billable Minutes: Therapeutic Activity 18 minutes    Treatment Type: Treatment  PT/PTA: PT     Number of PTA visits since last PT visit: 0     2023

## 2023-08-02 NOTE — PLAN OF CARE
08/02/23 1331   Post-Acute Status   Post-Acute Authorization Placement   Post-Acute Placement Status Pending payor review/awaiting authorization (if required)     BRAYAN Thursday 8/3/23. Per Rere in admissions at [facility], they will submit for an updated auth.    Mirela Cintron LCSW  [facility]  Ext. [phone]

## 2023-08-02 NOTE — PLAN OF CARE
Problem: Adult Inpatient Plan of Care  Goal: Plan of Care Review  Outcome: Ongoing, Progressing  Goal: Patient-Specific Goal (Individualized)  Outcome: Ongoing, Progressing  Goal: Absence of Hospital-Acquired Illness or Injury  Outcome: Ongoing, Progressing  Goal: Optimal Comfort and Wellbeing  Outcome: Ongoing, Progressing   Vital signs stable. AOX4. Patient slept well last night. Safety measures ensured. Call light within reach. Bed in low position.

## 2023-08-02 NOTE — PROGRESS NOTES
Jimmy Phillip - Telemetry [facility]  Progress Note    Patient Name: Marely Hamilton  MRN: [id number]  Patient Class: IP- Inpatient   Admission Date: 7/5/2023  Length of Stay: 28 days  Attending Physician: Seth Noyola MD  Primary Care Provider: Viktor Ross MD        Subjective:     Principal Problem:Gastrointestinal hemorrhage associated with duodenal ulcer        HPI:  Marely Hamilton is a 58 y.o. female with history of alcoholic cirrhosis, seizure disorder, DVT and IJ thrombus with recent admission for large retroperitoneal hemorrhage requiring IR embolization and IVC filter placement who presents for hematochezia. Onset this morning at [facility], alida blood per rectum per chart, sent to ED. Initially normotensive but then severe hypotension prompted initiation of levophed and intubation. Hgb 6.8, 2u pRBC given + 1u FFP ordered. Hgb 8.5 on 7/2. On levo and vaso currently. K 6.6 but renal function at baseline. Repeat pending. No prior EGDs on record.          Overview/Hospital Course:    Patient was seen at bedside, hematochezia still present post op by IR. Patient has required many transfusions of pRBCs and platelets due to ongoing down trending of Hgb. No clear source of ongoing hemorrhage by imaging. Patient has continued to require pressors to maintain MAP >65. Discussed with GI and IR regarding active bleeding post op, IR indicated there is not much else to do from their end bc they embolized a significant part of GDA. Pt is off of pressor requirement and not actively bleeding. GI re evaluated pt via EGD and found no sources of active bleeding. Pt is extubated and currently on BIPAP requirement due to de saturation in the 80's. Pt is to be seen by speech and PT/OT. Clear mucinous secretions via suction, chest physiotherapy, and cough assist device. Nebs to help with breathing as well. Pt is off BIPAP and currently on comfort flow. Triple therapy (amoxicillin, clarithromycin) started for 14 days  to treat for positive H pylori serology. Pt also had a NG tube placed so we may begin tube feedings. CxR, EKG, and ABG displayed no reason for tachycardia. Pt becomes anxious when seen by different provider teams. Remove art line and consult for midline placement. Continue to wean comfort flow as tolerated. Ensure pt is working with PT/OT/Speech for continuous improvement. Consult psych regarding patient history of bipolar disorder.         Interval History/Significant Events: Wean comfort flow as tolerated. Ensure patient is working with PT/OT/Speech for continuous improvement. Consult psych regarding psych history     7/16   history of alcoholic cirrhosis, seizure disorder, DVT and IJ thrombus with recent admission for large retroperitoneal hemorrhage requiring IR embolization and IVC filter placement who presents for hematochezia. s/p GI and IR in which they clipped (GI) and embolized (IR) possible sources of duodenal ulcer bleeding.  Hb stable  s/p extubation. sats 92% on 5L of NC.  CX ray - Detrimental changes since the previous examination including interval increase in pulmonary vascularity and bilateral diffuse interstitial pulmonary parenchymal opacification, progression of abnormal opacification at the left lung base, and development of small right-sided pleural effusion.   findings would be consistent with congestive heart failure.PT/OT recs SNF. BNP , procalcitonin and Echo.  SLP recs NPO .  On NGT feeds. psychiatry following for bipolar disorders.  Recs Zyprexa 5 mg QHS . Ammonia 48 WNL. AAOX 1. Hepatology consulted for cirrhosis. UA ordered   7/17 Saldaña removed. UA +. UC pending. started on IV ceftriaxone. ammonia at 59. on B/L UE mittens as pulled of NGT. Hepatology eval. resumed lactulose . discussed with sister and updated clinical status   7/18  MBS today - started puree nectar thick liquids.  on oxygen 3L via NC.   wean  as tolerated . UC -YEAST 10,000 - 49,999 cfu/ml Identification pending No  other significant isolate. hydroxyzine  PRN discontinued  due to it not being safe in delirium. oriented to person, hospital and year   7/19 SLP recs - tolerating Puree Diet - IDDSI Level 4, Mildly thick/Nectar thick liquids - IDDSI Level 2 .stool output 600ml/ monitor on lactulose . improved mentation AAOX 3  7/26 Await NH placement. Little improvement in functional status. Tunneled fistula seen near rectum. Low grade temp.  7/26 K replaced. Await NH placement. Little improvement in functional status. Tunneled fistula seen near rectum. Low grade temp. Consulted psych for recs concerning discharge.   7/27 Sister wanted her started back on lithium but psychiatry recommended  increasing Zyprexa 7.5mg QHS. EKG to monitor QtcDiscontinue zyprexa if Qtc >480. EKG QTC 493ms.  Zyprexa resumed at  zyprexa 5 nightly per psychiatry . Urinary retention this AM s/p straight cath   7/28 continues with confusion. UA + . UC pending. continue ceftriaxone. no fistula found on scaral exam with wound care.  Repeat EKG today to monitor Qtc. Saldaña catheter placed for urinary retention .  started  lithium 300 mg  nightly due to concern for manic episode  7/29 SLP recs - Mechanical soft, Thin . UC - GRAM NEGATIVE BILLY 10,000 - 49,999 cfu/ml  Identification and susceptibility pending  .ammonia elevated at 70. lactulose increased to 30g TID .started rifaximin BID   7/30 BMX 1 documented . pending mental status improvement   7/31 UC with klebsialla penumoniae and Aerogenes resisant to ceftriaxone. started on ciprofloxacin . sodium bicarbonate for bicarb of 16   8/1 K replaced. ammonia trending down . QTc  458ms . improved mentation today. oriented to date and month   8/2 Improving mentation . 3 BMs yesterday           Review of Systems:   Pain scale:   Constitutional:  fever,  chills, headache, vision loss, hearing loss, weight loss, Generalized weakness, falls, loss of smell, loss of taste, poor appetite,  sore throat  Respiratory: cough,  shortness of breath.   Cardiovascular: chest pain, dizziness, palpitations, orthopnea, swelling of feet, syncope  Gastrointestinal: nausea, vomiting, abdominal pain, diarrhea, black stool,  blood in stool, change in bowel habits  Genitourinary: hematuria, dysuria, urgency, frequency  Integument/Breast: rash,  pruritis  Hematologic/Lymphatic: easy bruising, lymphadenopathy  Musculoskeletal: arthralgias , myalgias, back pain, neck pain, knee pain  Neurological: confusion, seizures, tremors, slurred speech  Behavioral/Psych:  depression, anxiety, auditory or visual hallucinations     OBJECTIVE:     Physical Exam:  Body mass index is 24.69 kg/m².    Constitutional: Appears well-developed and well-nourished.   Head: Normocephalic and atraumatic. + icterus  Neck: Normal range of motion. Neck supple.   Cardiovascular: Normal heart rate.  Regular heart rhythm.  Pulmonary/Chest: Effort normal.   Abdominal: No distension.  No tenderness.  baugh catheter   Musculoskeletal: Normal range of motion.   Neurological: Alert and  oriented to person, hospital follows simple commands .not oriented to situation  Skin: Skin is warm and dry. ehcymoses on the upper chest    Psychiatric: Normal mood and affect. Behavior is normal.                  Vital Signs  Temp: 97.7 °F (36.5 °C) (08/02/23 1154)  Pulse: 84 (08/02/23 1456)  Resp: 18 (08/02/23 1456)  BP: (Abnormal) 101/59 (08/02/23 1154)  SpO2: 98 % (08/02/23 1456)     24 Hour VS Range    Temp:  [97.5 °F (36.4 °C)-98.8 °F (37.1 °C)]   Pulse:  [79-87]   Resp:  [16-24]   BP: (101-121)/(55-59)   SpO2:  [96 %-99 %]     Intake/Output Summary (Last 24 hours) at 8/2/2023 1510  Last data filed at 8/2/2023 0523  Gross per 24 hour   Intake 500 ml   Output 800 ml   Net -300 ml         I/O This Shift:  No intake/output data recorded.    Wt Readings from Last 3 Encounters:   07/28/23 61.2 kg (135 lb)   07/03/23 44.7 kg (98 lb 8.7 oz)   06/29/23 59.9 kg (132 lb 0.9 oz)       I have personally reviewed  "the vitals and recorded Intake/Output     Laboratory/Diagnostic Data:    CBC/Anemia Labs: Coags:    Recent Labs   Lab 07/31/23 0444 08/01/23  0501 08/02/23  0504   WBC 2.18* 2.81* 2.50*   HGB 8.8* 8.2* 8.7*   HCT 29.0* 25.9* 28.9*   PLT 58* 62* 60*   * 105* 106*   RDW SEE COMMENT 24.2* 23.4*    Recent Labs   Lab 07/31/23 0444 08/01/23  0501 08/02/23  0504   APTT 39.3* 42.7* 42.4*        Chemistries: ABG:   Recent Labs   Lab 07/31/23 0444 08/01/23  0501 08/02/23  0504    138 140   K 3.6 3.4* 4.1   * 114* 114*   CO2 16* 19* 18*   BUN 3* 3* <3*   CREATININE 0.4* 0.4* 0.4*   CALCIUM 7.9* 7.8* 7.5*   PROT 4.8* 4.5* 4.7*   BILITOT 3.2* 2.6* 3.2*   ALKPHOS 102 123 130   ALT 13 13 11   AST 32 30 32   MG 1.6 1.6 1.6    No results for input(s): "PH", "PCO2", "PO2", "HCO3", "POCSATURATED", "BE" in the last 168 hours.       POCT Glucose: HbA1c:    Recent Labs   Lab 08/01/23  0056 08/01/23  0622 08/01/23  1145 08/02/23  0034 08/02/23  0533 08/02/23  1155   POCTGLUCOSE 177* 118* 149* 131* 118* 101    Hemoglobin A1C   Date Value Ref Range Status   05/29/2023 <4.0 (A) 4.0 - 5.6 % Final     Comment:     ADA Screening Guidelines:  5.7-6.4%  Consistent with prediabetes  >or=6.5%  Consistent with diabetes    High levels of fetal hemoglobin interfere with the HbA1C  assay. Heterozygous hemoglobin variants (HbS, HgC, etc)do  not significantly interfere with this assay.   However, presence of multiple variants may affect accuracy.  Falsely low HA1c results may be observed in patients   with clinical conditions that shorten erythrocyte   life span or decrease mean erythrocyte age.  HA1c   may not accurately reflect glycemic control when   clinical conditions that affect erythrocyte survival   are present. Fructosamine may be used as an alternate  measurement of glycemic control.     01/22/2021 4.1 4.0 - 5.6 % Final     Comment:     ADA Screening Guidelines:  5.7-6.4%  Consistent with prediabetes  >or=6.5%  Consistent " "with diabetes  High levels of fetal hemoglobin interfere with the HbA1C  assay. Heterozygous hemoglobin variants (HbS, HgC, etc)do  not significantly interfere with this assay.   However, presence of multiple variants may affect accuracy.     12/10/2020 4.6 4.0 - 5.6 % Final     Comment:     ADA Screening Guidelines:  5.7-6.4%  Consistent with prediabetes  >or=6.5%  Consistent with diabetes  High levels of fetal hemoglobin interfere with the HbA1C  assay. Heterozygous hemoglobin variants (HbS, HgC, etc)do  not significantly interfere with this assay.   However, presence of multiple variants may affect accuracy.          Cardiac Enzymes: Ejection Fractions:    No results for input(s): "CPK", "CPKMB", "MB", "TROPONINI" in the last 72 hours. EF   Date Value Ref Range Status   07/16/2023 65 % Final   06/03/2023 70 % Final          No results for input(s): "COLORU", "APPEARANCEUA", "PHUR", "SPECGRAV", "PROTEINUA", "GLUCUA", "KETONESU", "BILIRUBINUA", "OCCULTUA", "NITRITE", "UROBILINOGEN", "LEUKOCYTESUR", "RBCUA", "WBCUA", "BACTERIA", "SQUAMEPITHEL", "HYALINECASTS" in the last 48 hours.    Invalid input(s): "WRIGHTSUR"        Procalcitonin (ng/mL)   Date Value   06/16/2023 0.26 (H)     Lactate (Lactic Acid) (mmol/L)   Date Value   07/16/2023 1.4   07/16/2023 1.4   07/06/2023 1.5   07/05/2023 8.0 (HH)   06/11/2023 6.2 (HH)     BNP (pg/mL)   Date Value   07/16/2023 174 (H)     No results found for: "CRP", "SEDRATE"  D-Dimer (mg/L FEU)   Date Value   07/07/2023 2.22 (H)     Ferritin (ng/mL)   Date Value   05/18/2023 110   10/23/2015 412 (H)   09/19/2015 599 (H)   09/19/2015 599 (H)   07/23/2015 551 (H)   06/17/2015 612 (H)     LD (U/L)   Date Value   06/05/2023 249   05/31/2023 426 (H)   09/19/2015 280 (H)     Troponin I (ng/mL)   Date Value   06/13/2023 0.029 (H)   06/13/2023 0.030 (H)   05/18/2023 0.035 (H)   05/18/2023 0.037 (H)   05/05/2023 <0.006   01/25/2023 <0.006     CPK (U/L)   Date Value   05/05/2023 47 "   01/25/2023 27   06/14/2020 23 (L)     CK (U/L)   Date Value   04/15/2023 98     Results for orders placed or performed during the hospital encounter of 05/18/23   Vitamin D   Result Value Ref Range    Vit D, 25-Hydroxy 15 (L) 30 - 96 ng/mL     SARS-CoV2 (COVID-19) Qualitative PCR (no units)   Date Value   07/26/2023 Not Detected   06/30/2023 Not Detected   02/14/2023 Not Detected     SARS-CoV-2 RNA, Amplification, Qual (no units)   Date Value   05/28/2023 Negative   11/02/2021 Negative   01/21/2021 Negative   01/11/2021 Negative   11/21/2020 Negative   06/16/2020 Negative       Microbiology labs for the last week  Microbiology Results (last 7 days)       Procedure Component Value Units Date/Time    Urine culture [[id number]]  (Abnormal)  (Susceptibility) Collected: 07/28/23 0541    Order Status: Completed Specimen: Urine Updated: 07/31/23 1555     Urine Culture, Routine KLEBSIELLA PNEUMONIAE  10,000 - 49,999 cfu/ml        KLEBSIELLA AEROGENES  10,000 - 49,999 cfu/ml      Narrative:      Specimen Source->Urine            Reviewed and noted in plan where applicable- Please see chart for full lab data.    Lines/Drains:       Peripheral IV - Single Lumen 07/12/23 1148 20 G;1 3/4 in Left Forearm (Active)   Site Assessment Clean;Dry;Intact 07/16/23 0701   Extremity Assessment Distal to IV No abnormal discoloration 07/16/23 0701   Line Status No blood return;Flushed;Saline locked 07/16/23 0701   Dressing Status Dry;Clean;Intact 07/16/23 0701   Dressing Intervention Integrity maintained 07/16/23 0701   Dressing Change Due 07/16/23 07/16/23 0701   Site Change Due 07/16/23 07/16/23 0701   Reason Not Rotated Not due 07/16/23 0701   Number of days: 3            Peripheral IV - Single Lumen 07/14/23 1540 20 G;1 3/4 in Right Upper Arm (Active)   Site Assessment Intact;Clean;Dry 07/16/23 0701   Extremity Assessment Distal to IV No abnormal discoloration 07/16/23 0701   Line Status Blood return noted;Flushed;Saline locked 07/16/23  "0701   Dressing Status Dry;Intact;Clean 07/16/23 0701   Dressing Intervention Integrity maintained 07/16/23 0701   Dressing Change Due 07/18/23 07/16/23 0701   Site Change Due 07/18/23 07/16/23 0701   Reason Not Rotated Not due 07/16/23 0701   Number of days: 1            NG/OG Tube 07/13/23 1311 Blandon sump 18 Fr. Right nostril (Active)   Placement Check placement verified by aspirate characteristics 07/16/23 0701   Tolerance no signs/symptoms of discomfort 07/16/23 0701   Securement secured to nostril center w/ adhesive device 07/16/23 0701   Clamp Status/Tolerance unclamped 07/16/23 0701   Suction Setting/Drainage Method suction at the bedside 07/16/23 0701   Insertion Site Appearance no redness, warmth, tenderness, skin breakdown, drainage 07/16/23 0701   Flush/Irrigation flushed w/;water 07/16/23 0502   Feeding Type continuous;by pump 07/16/23 0701   Feeding Action feeding continued 07/16/23 0701   Current Rate (mL/hr) 50 mL/hr 07/16/23 0701   Goal Rate (mL/hr) 50 mL/hr 07/16/23 0701   Intake (mL) 20 mL 07/15/23 0800   Water Bolus (mL) 250 mL 07/16/23 0501   Rate Formula Tube Feeding (mL/hr) 20 mL/hr 07/14/23 0400   Formula Name Isosource 1.5 07/16/23 0701   Intake (mL) - Formula Tube Feeding 50 07/16/23 0700   Residual Amount (ml) 30 ml 07/15/23 2337   Number of days: 2            Urethral Catheter 07/05/23 1332 Double-lumen (Active)   Site Assessment Clean;Intact 07/16/23 0701   Collection Container Urimeter 07/16/23 0701   Securement Method secured to top of thigh w/ adhesive device 07/16/23 0701   Catheter Care Performed yes 07/16/23 0701   Reason for Continuing Urinary Catheterization Non-healing sacral/perineal wound 07/16/23 0701   CAUTI Prevention Bundle Securement Device in place with 1" slack;Intact seal between catheter & drainage tubing;Drainage bag/urimeter off the floor;Sheeting clip in use;No dependent loops or kinks;Drainage bag/urimeter not overfilled (<2/3 full);Drainage bag/urimeter below " bladder 07/16/23 0701   Output (mL) 75 mL 07/16/23 0738   Number of days: 10            Fecal Incontinence  07/09/23 2300 (Active)   $ Fecal Management Supplies Fecal Management System (Supply) 07/14/23 1954   Application fecal incontinence  in place 07/16/23 0701   Drainage Method attached to drainage bag 07/16/23 0701   Securement to gravity 07/16/23 0701   Skin cleansed, skin barrier applied 07/16/23 0701   Tolerance no signs/symptoms of discomfort 07/16/23 0701   Stool (mL) 200 mL 07/16/23 0501   Number of days: 6       Imaging  ECG Results              EKG 12-lead (Final result)  Result time 07/05/23 13:56:05      Final result by Interface, Lab In [facility] (07/05/23 13:56:05)               Narrative:    Test Reason : E87.5,    Vent. Rate : 103 BPM     Atrial Rate : 103 BPM     P-R Int : 132 ms          QRS Dur : 076 ms      QT Int : 342 ms       P-R-T Axes : 073 054 084 degrees     QTc Int : 448 ms    Age and gender specific analysis  Sinus tachycardia  Low voltage QRS  Low anterior forces and R wave progression  Possibly acute STEMI    ACUTE MI / STEMI    Abnormal ECG  When compared with ECG of 05-JUL-2023 11:07,  No significant change was found  Confirmed by Abimael CLEMENTS MD ([id number]) on 7/5/2023 1:55:55 PM    Referred By: [email or web address]   SELF           Confirmed By:Abimael CLEMENTS MD                                   EKG 12-lead (Final result)  Result time 07/05/23 14:01:06      Final result by Interface, Lab In [facility] (07/05/23 14:01:06)               Narrative:    Test Reason : K92.2,    Vent. Rate : 096 BPM     Atrial Rate : 096 BPM     P-R Int : 114 ms          QRS Dur : 066 ms      QT Int : 352 ms       P-R-T Axes : 078 090 065 degrees     QTc Int : 444 ms    Normal sinus rhythm  Vertical axis  Otherwise normal ECG  When compared with ECG of 27-JUN-2023 12:05,  T wave inversion no longer evident in Anterior leads  Confirmed by Camerno Hodge MD ([id number]) on 7/5/2023 2:01:00 PM    Referred By: System  System           Confirmed By:Cameron Hodge MD                                  Results for orders placed during the hospital encounter of 05/28/23    Echo    Interpretation Summary  · The left ventricle is normal in size with hyperdynamic systolic function. The estimated ejection fraction is 70%.  · Normal right ventricular size with normal right ventricular systolic function.  · Normal left ventricular diastolic function.  · Mild left atrial enlargement.  · Mechanically ventilated; cannot use inferior caval vein diameter to estimate central venous pressure.  · Trivial pericardial effusion.  · There is a left pleural effusion.      X-Ray Chest AP Portable  Narrative: EXAMINATION:  XR CHEST AP PORTABLE    CLINICAL HISTORY:  Fever;    TECHNIQUE:  Single frontal view of the chest was performed.    COMPARISON:  No 07/16/2023 ne    FINDINGS:  Mild cardiomegaly.  Mild edema.  Small ill-defined opacities noted adjacent to the left hilum similar to the previous study.  Opacification at the left lung base probably atelectatic changes and  smaller amount of pleural effusion.  Impression: No significant changes    Electronically signed by: Americo Graf MD  Date:    07/25/2023  Time:    12:17      Labs, Imaging, EKG and Diagnostic results from 8/2/2023 were reviewed.    Medications:  Medication list was reviewed and changes noted under Assessment/Plan.  No current facility-administered medications on file prior to encounter.     No current outpatient medications on file prior to encounter.     Scheduled Medications:  albuterol-ipratropium, 3 mL, Nebulization, Q6H WAKE  ciprofloxacin HCl, 500 mg, Oral, Q12H  lactulose, 30 g, Oral, TID  levetiracetam, 1,000 mg, Oral, BID  lithium, 300 mg, Oral, QHS  OLANZapine, 5 mg, Oral, QHS  pantoprazole, 40 mg, Oral, BID  rifAXImin, 550 mg, Oral, BID  sodium bicarbonate, 650 mg, Oral, BID      PRN: dextrose 10%, dextrose 10%, glucagon (human recombinant), insulin aspart U-100, oxyCODONE,  sodium chloride 0.9%  Infusions:       Estimated Creatinine Clearance: 133.5 mL/min (A) (based on SCr of 0.4 mg/dL (L)).    Assessment/Plan:      * Gastrointestinal hemorrhage associated with duodenal ulcer    as  above        Urinary retention  7/27  2 episodes. s/p straight cath   7/28 Saldaña catheter placed for urinary retention         Irritant contact dermatitis due to fecal, urinary or dual incontinence  wound care eval      Dysphagia  7/18  MBS today -started puree nectar thick liquids.    7/29 SLP recs - Mechanical soft, Thin  liquids     Hypoxia   7/16 sats 92% on 5L of NC.  CX ray - Detrimental changes since the previous examination including interval increase in pulmonary vascularity and bilateral diffuse interstitial pulmonary parenchymal opacification, progression of abnormal opacification at the left lung base, and development of small right-sided pleural effusion.   findings would be consistent with congestive heart failure.PT/OT recs SNF. BNP , procalcitonin and Echo.   7/27 Sats 97% on RA    H. pylori infection    positive serology for H pylori, begin triple therapy with clarithromycin,amoxicillin, and PPI for 14 days       Hematochezia    - GI EGD clipped duodenal ulcers for IR reference  - Transfuse blood products for active bleeding   - trend CBC and follow  - IR embolized GDA for duodenal hyperemia   -- GI re evaluated site of duodenal ulcers, no noted active bleeding  -- Continue PPI BID for 8 weeks, then once daily after that  --Pt had positive serology for H pylori, had triple therapy with clarithromycin,amoxicillin, and PPI for 14 days      Acute blood loss anemia     - Maintain IV access with 2 large bore Ivs  - Intravascular resuscitation/support with IVFs   - Hold all NSAIDs and anticoagulants, unless contraindicated.  - Please correct any coagulopathy with platelets and FFP for goal of platelets >50K and INR <2.0  - Please notify GI team if there is significant change in patient's  clinical status  - To date, patient has required at least 21 units of pRBCs to maintain Hgb >7     7/16     Patient's with macrocytic anemia.. Hemoglobin stable. Etiology likely due to acute blood loss.  Current CBC reviewed-    Recent Labs   Lab 07/31/23  0444 08/01/23  0501 08/02/23  0504   HGB 8.8* 8.2* 8.7*         Component Value Date/Time     (H) 08/02/2023 0504    RDW 23.4 (H) 08/02/2023 0504    IRON 132 05/18/2023 1527    FERRITIN 110 05/18/2023 1527    RETIC 3.9 (H) 06/05/2023 0935    FOLATE 11.5 07/17/2023 0619    [id number] 07/17/2023 0619    OCCULTBLOOD Negative 09/19/2015 0137     Monitor CBC and transfuse if H/H drops below 7/21.        Retroperitoneal bleed  Retroperitoneum: No significant adenopathy.  Similar sized left retroperitoneal hematoma measuring 11 x 9 cm (series 5, image 100; previously 11 x 9 cm).  The left iliacus collection also measures similar to prior at 5.4 cm (series 5, image 127; previously 5.6 cm).  Layering hyperdensity within these collections suggesting different ages in blood products.  No significant volume active extravasation into these collections  - CT-A demonstrated stable retroperitoneal hematoma. No need for intervention at this time.          Supratherapeutic INR  patient with INR   Recent Labs   Lab 07/23/23  0434   INR 1.5*   . INR is supratherapeutic. secondary to coagulopathy from liver disease.monitor      Hypokalemia  replaced       UTI (urinary tract infection)  7/17 Saldaña removed. UA +. UC pending. started on IV ceftriaxone.  7/18 UC -YEAST 10,000 - 49,999 cfu/ml Identification pending No other significant isolate  7/28 continues with confusion. UA + . UC pending. continue ceftriaxone. no fistula found on sacral exam with wound care.   7/29   UC - GRAM NEGATIVE BILLY 10,000 - 49,999 cfu/ml  Identification and susceptibility pending     7/31 UC with klebsialla penumoniae and Aerogenes resisant to ceftriaxone. started on ciprofloxacin .    Bipolar 1  disorder     - Consult psych regarding Bipolar 1 disorder history  7/27 Sister wanted her started back on lithium but psychiatry recommended  increasing Zyprexa 7.5mg QHS. EKG to monitor Qtc.Discontinue zyprexa if Qtc >480.  EKG QTC 493ms.  Zyprexa swtiched to 5mg HS  7/28  started  lithium 300 mg  nightly due to concern for manic episode       Hepatic encephalopathy   ammonia 190s on admit -->90s . AAOX 1. no lactulose give due to GI bleed  repeat ammonia. Hepatolog eval  with     MELD 3.0: 19 at 7/25/2023  2:48 AM  MELD-Na: 15 at 7/25/2023  2:48 AM  Calculated from:  Serum Creatinine: 0.3 mg/dL (Using min of 1 mg/dL) at 7/25/2023  2:48 AM  Serum Sodium: 143 mmol/L (Using max of 137 mmol/L) at 7/25/2023  2:48 AM  Total Bilirubin: 3.1 mg/dL at 7/25/2023  2:48 AM  Serum Albumin: 1.8 g/dL at 7/25/2023  2:48 AM  INR(ratio): 1.5 at 7/23/2023  4:34 AM  Age at listing (hypothetical): 58 years  Sex: Female at 7/25/2023  2:48 AM  7/16 Ammonia 48 WNL. AAOX 1. Hepatology consulted for cirrhosis.    7/17  Hepatology eval. resumed lactulose.  7/18  hydroxyzine  PRN  discontinued  due to it not being safe in delirium  7/29 ammonia elevated at 70. lactulose increased to 30g TID.  started rifaximin BID     Severe protein-calorie malnutrition  Nutrition consulted. Most recent weight and BMI monitored-     Measurements:  Wt Readings from Last 1 Encounters:   07/27/23 61.2 kg (135 lb)   Body mass index is 24.69 kg/m².    Patient has been screened and assessed by RD.    Malnutrition Type:  Context: social/environmental circumstances  Level: severe    Malnutrition Characteristic Summary:  Energy Intake (Malnutrition): less than or equal to 75% for greater than or equal to 1 month  Subcutaneous Fat (Malnutrition): severe depletion  Muscle Mass (Malnutrition): severe depletion      History of seizure  on Keppra       Thrombocytopenia  with cirrhosis   Patient with thrombocytopenia   Recent Labs   Lab 07/31/23  0444 08/01/23  050  08/02/23  0504   PLT 58* 62* 60*   . Platelet counts stable  .monitor      Cirrhosis, Capri's  alcoholic cirrhosis  MELD 3.0: 19 at 7/25/2023  2:48 AM  MELD-Na: 15 at 7/25/2023  2:48 AM  Calculated from:  Serum Creatinine: 0.3 mg/dL (Using min of 1 mg/dL) at 7/25/2023  2:48 AM  Serum Sodium: 143 mmol/L (Using max of 137 mmol/L) at 7/25/2023  2:48 AM  Total Bilirubin: 3.1 mg/dL at 7/25/2023  2:48 AM  Serum Albumin: 1.8 g/dL at 7/25/2023  2:48 AM  INR(ratio): 1.5 at 7/23/2023  4:34 AM  Age at listing (hypothetical): 58 years  Sex: Female at 7/25/2023  2:48 AM     No results for input(s): AMMONIA in the last 168 hours.  Strict input /output monitor, daily weights        VTE Risk Mitigation (From admission, onward)           Ordered     Place sequential compression device  Until discontinued         07/14/23 1024     Reason for No Pharmacological VTE Prophylaxis  Once        Question:  Reasons:  Answer:  Active Bleeding    07/05/23 1334     IP VTE HIGH RISK PATIENT  Once         07/05/23 1334                    Discharge Planning   BRAYAN: 8/3/2023     Code Status: DNR   Is the patient medically ready for discharge?: No    Reason for patient still in hospital (select all that apply): Treatment  Discharge Plan A: Skilled Nursing Facility   Discharge Delays: None known at this time              Seth Noyola MD  Department of Hospital Medicine   [facility] - Telemetry Stepdown

## 2023-08-02 NOTE — PROGRESS NOTES
"CONSULTATION LIAISON PSYCHIATRY PROGRESS NOTE    Patient Name: Marely Hamilton  MRN: [id number]  Patient Class: IP- Inpatient  Admission Date: 7/5/2023  Attending Physician: Seth Noyola MD      SUBJECTIVE:   Marely Hamilton is a 58 y.o. female with past psychiatric history of bipolar disorder, alcohol use disorder & past pertinent medical history of seizure disorder, alcohol induced hepatic cirrhosis presents to the ED/admitted to the hospital for Gastrointestinal hemorrhage associated with duodenal ulcer. Patient presented from SNF (when she experienced alida blood per rectum per chart review), for which she was recently discharged to when she presented to the hospital on for hepatic encephalopathy c/b retroperitoneal bleed (s/p ICV filter and IR embolization).      Psychiatry consulted for  medication management    Medical Student Evaluation, Ousmane Rivas:  Patient stated that she has been eating well and was able to sleep 6 hours overnight. During the interview, patient was easily distracted and started reading words from the TV and made sentences that did not make sense.     Resident Evaluation:  Today, patient stated that she was feeling better, when asked to clarify, she stated that she was feeling better from her hips to legs and then continued with tangential speech. Patient was conversational but with bizarre statements at time and often read the words off of the TV screen. Patient was oriented to self, year, and month, disoriented to place, stated that she was "in a bath." On reassessment in the afternoon, patient was more conversational and less tangential in her responses.       OBJECTIVE:    Mental Status Exam:  General Appearance: dressed in hospital garb, lying in bed, thin and gaunt  Behavior: cooperative, pleasant, appropriate eye-contact, under good behavioral control  Involuntary Movements and Motor Activity: +tremors, +evidence of tardive dyskinesia  Gait and Station: unable to assess - " "patient lying down or seated  Speech and Language: spontaneous, fluent English, soft, varies in tone  Mood: "better"  Affect: flat, constricted  Thought Process and Associations: tangential, scattered  Thought Content and Perceptions:: no suicidal ideation, no homicidal ideation, no hallucinations  Sensorium and Orientation: alert, oriented to year, oriented to month, Oriented to person, disoriented to place  Recent and Remote Memory: impaired 2/2 medical condition   Attention and Concentration: easily distractible  Fund of Knowledge: grossly intact  Insight: impaired due to medical condition, improving  Judgment:  Impaired due to medical condition, improving    CAM ICU positive? No  CAM-ICU:  Acute change and/or fluctuating course of mental status: No  Inattention (SAVEAHAART): Yes, Inattentive  "Squeeze my hand, only when you hear, the letter 'A'."  Altered Level of Consciousness: No  Disorganized Thinking (Errors >1/6): No  "Will a stone float on water?"  "Are there fish in the sea?"  "Does one pound weigh more than two?"  "Can you use a hammer to pound a nail?"  Command(s):  "Hold up 2 fingers."  "Now do the same thing with the other hand."          ASSESSMENT & RECOMMENDATIONS   Bipolar Disorder with crystal    PSYCH MEDICATIONS  Scheduled: Continue lithium 300 mg nightly and Continue zyprexa 5 mg nightly  Does not appear to be requiring PRNs at this time, in future if patient does become agitated recommend Zyprexa 5 mg PO/IM.   Monitor QTc if patient requires PRN zyprexa.      DELIRIUM PRECAUTION BEHAVIOR MANAGEMENT  PLEASE utilize CHEMICAL restraints with PRN meds first for agitation. Minimize use of PHYSICAL restraints OR have periods of being out of physical restraints if possible.  Keep window shades open and room lit during day and room dim at night in order to promote normal sleep-wake cycles  Encourage family at bedside. [place] patient often to situation, location, date.  Continue to Limit or Discontinue " use of Narcotics, Benzos and Anti-cholinergic medications as they may worsen delirium.  Continue medical workup for causative etiology of Delirium.      RISK ASSESSMENT  NO NEED FOR PEC patient NOT in any imminent danger of hurting self or others and not gravely disabled.      FOLLOW UP  Will follow up while in house     DISPOSITION - once medically cleared:   Defer to medical team         Please contact ON CALL psychiatry service (24/7) for any acute issues that may arise.    I attest to having seen and evaluated the above patient with student, Ousmane Rivas. I have personally reviewed the note, assessment, and plan, and have made necessary adjustments.    Dr. Mehdi Aguilera   Psychiatry  [facility]  8/2/2023 9:52 AM        --------------------------------------------------------------------------------------------------------------------------------------------------------------------------------------------------------------------------------------    CONTINUED OBJECTIVE clinical data & findings reviewed and noted for above decision making    Current Medications:   Scheduled Meds:    albuterol-ipratropium  3 mL Nebulization Q6H WAKE    ciprofloxacin HCl  500 mg Oral Q12H    lactulose  30 g Oral TID    levetiracetam  1,000 mg Oral BID    lithium  300 mg Oral QHS    OLANZapine  5 mg Oral QHS    pantoprazole  40 mg Oral BID    rifAXImin  550 mg Oral BID    sodium bicarbonate  650 mg Oral BID     PRN Meds: dextrose 10%, dextrose 10%, glucagon (human recombinant), insulin aspart U-100, oxyCODONE, sodium chloride 0.9%    Allergies:   Review of patient's allergies indicates:   Allergen Reactions    Sulfa (sulfonamide antibiotics) Rash    Codeine Nausea And Vomiting       Vitals  Vitals:    08/02/23 0945   BP:    Pulse: 79   Resp: 18   Temp:        Labs/Imaging/Studies:  Recent Results (from the past 24 hour(s))   POCT glucose    Collection Time: 08/01/23 11:45 AM   Result Value Ref Range    POCT  Glucose 149 (H) 70 - 110 mg/dL   Calcium, ionized    Collection Time: 08/01/23  3:51 PM   Result Value Ref Range    Ionized Calcium 1.24 1.06 - 1.42 mmol/L   POCT glucose    Collection Time: 08/02/23 12:34 AM   Result Value Ref Range    POCT Glucose 131 (H) 70 - 110 mg/dL   APTT    Collection Time: 08/02/23  5:04 AM   Result Value Ref Range    aPTT 42.4 (H) 21.0 - 32.0 sec   Magnesium    Collection Time: 08/02/23  5:04 AM   Result Value Ref Range    Magnesium 1.6 1.6 - 2.6 mg/dL   Calcium, ionized    Collection Time: 08/02/23  5:04 AM   Result Value Ref Range    Ionized Calcium 1.22 1.06 - 1.42 mmol/L   CBC Auto Differential    Collection Time: 08/02/23  5:04 AM   Result Value Ref Range    WBC 2.50 (L) 3.90 - 12.70 K/uL    RBC 2.72 (L) 4.00 - 5.40 M/uL    Hemoglobin 8.7 (L) 12.0 - 16.0 g/dL    Hematocrit 28.9 (L) 37.0 - 48.5 %     (H) 82 - 98 fL    MCH 32.0 (H) 27.0 - 31.0 pg    MCHC 30.1 (L) 32.0 - 36.0 g/dL    RDW 23.4 (H) 11.5 - 14.5 %    Platelets 60 (L) 150 - 450 K/uL    MPV 10.9 9.2 - 12.9 fL    Immature Granulocytes 0.4 0.0 - 0.5 %    Gran # (ANC) 1.5 (L) 1.8 - 7.7 K/uL    Immature Grans (Abs) 0.01 0.00 - 0.04 K/uL    Lymph # 0.6 (L) 1.0 - 4.8 K/uL    Mono # 0.3 0.3 - 1.0 K/uL    Eos # 0.2 0.0 - 0.5 K/uL    Baso # 0.02 0.00 - 0.20 K/uL    nRBC 0 0 /100 WBC    Gran % 58.0 38.0 - 73.0 %    Lymph % 24.4 18.0 - 48.0 %    Mono % 10.0 4.0 - 15.0 %    Eosinophil % 6.4 0.0 - 8.0 %    Basophil % 0.8 0.0 - 1.9 %    Differential Method Automated    Comprehensive Metabolic Panel    Collection Time: 08/02/23  5:04 AM   Result Value Ref Range    Sodium 140 136 - 145 mmol/L    Potassium 4.1 3.5 - 5.1 mmol/L    Chloride 114 (H) 95 - 110 mmol/L    CO2 18 (L) 23 - 29 mmol/L    Glucose 101 70 - 110 mg/dL    BUN <3 (L) 6 - 20 mg/dL    Creatinine 0.4 (L) 0.5 - 1.4 mg/dL    Calcium 7.5 (L) 8.7 - 10.5 mg/dL    Total Protein 4.7 (L) 6.0 - 8.4 g/dL    Albumin 1.8 (L) 3.5 - 5.2 g/dL    Total Bilirubin 3.2 (H) 0.1 - 1.0 mg/dL     Alkaline Phosphatase 130 55 - 135 U/L    AST 32 10 - 40 U/L    ALT 11 10 - 44 U/L    eGFR >60.0 >60 mL/min/1.73 m^2    Anion Gap 8 8 - 16 mmol/L   POCT glucose    Collection Time: 08/02/23  5:33 AM   Result Value Ref Range    POCT Glucose 118 (H) 70 - 110 mg/dL     Imaging Results              CTA Acute GI [place], Abdomen and Pelvis (Final result)  Result time 07/05/23 17:15:19      Final result by Mumtaz Garsia MD (07/05/23 17:15:19)                   Impression:      Images are degraded by significant streak and motion artifacts.    Stable large left retroperitoneal hematoma and left iliacus hematoma.  No contrast extravasation within the hematomas or bowel to indicate active arterial bleed.    Hepatic cirrhosis.  Diffuse wall thickening of the stomach and colon, which can be seen in the setting of hepatic dysfunction.  However, superimposed inflammatory processes are not excluded.    Multiple, nondilated, distended fluid-filled loops of small bowel without a definite transition point.  Stool and air seen within the colon, this is suggestive of ileus.    Small left pleural effusion with associated compressive atelectasis.    Cholelithiasis.    Cardiomegaly.    Electronically signed by resident: Emiliano Mcmahon  Date:    07/05/2023  Time:    16:29    Electronically signed by: Mumtaz Garsia MD  Date:    07/05/2023  Time:    17:15               Narrative:    EXAMINATION:  CTA ACUTE GI BLEED, ABDOMEN AND PELVIS    CLINICAL HISTORY:  GI bleed;    TECHNIQUE:  Initial noncontrast  images were obtained of the abdomen and pelvis.  CT axial angiography images were then obtained from the lung bases to the pubic symphysis following the intravenous administration of 100 of Omnipaque 350 with delayed images obtained per GI bleeding protocol.  Sagittal and coronal reformats were provided.    COMPARISON:  CTA GI bleed 06/13/2023    FINDINGS:  Images are degraded by significant streak and motion artifacts.    Lungs:  Interval decrease in size of the small right pleural effusion with associated compressive atelectasis.  Resolution of the left pleural effusion.    Heart: Enlarged.  No pericardial effusion.    Liver: Nodular contour of the liver.  No focal abnormality.    Gallbladder: Multiple calcified gallstones.  No pericholecystic inflammatory changes.    Bile ducts: No intrahepatic or extrahepatic biliary ductal dilatation.    Spleen: Normal size.    Pancreas: No mass. No ductal dilatation. No peripancreatic fat stranding.    Adrenals: No significant abnormality    Renal/Ureters: Bilateral cortical thinning.  No hydronephrosis.  Proximal ureters are normal caliber.  The distal ureters are difficult to evaluate due to streak artifact.  Bladder is decompressed with Saldaña catheter in place.    Reproductive: Uterus appears without significant abnormality.  No adnexal mass, noting limited evaluation due to significant streak artifact.    Stomach/Bowel: Stomach is distended with ingested contents with thickened rugal folds.  Small bowel is distended with multiple nondilated fluid-filled loops.  No transition point.  Appendix is normal.  Diffuse wall thickening throughout the colon with mild stool burden.  Diffuse wall thickening of the rectum.  No contrast extravasation to indicate active arterial bleed.    Peritoneum: Stable large left retroperitoneal hematoma measuring 11.5 x 7.8 x 12.6 cm.  No contrast extravasation to indicate active bleed within the hematoma.  Additional smaller hematoma anterior to the left iliacus muscle, which appears stable compared to prior CTA.  No free fluid. No intraperitoneal free air.    Lymph Nodes: Multiple prominent mesenteric and retroperitoneal lymph nodes.    Vasculature: Abdominal aorta tapers normally.  Mild atherosclerosis of the abdominal aorta and its branches.    Bones: Bony mineralization is diminished.  Left hip arthroplasty.  Generalized body wall edema.    Soft Tissues: No significant  abnormality.                                       CT Head Without Contrast (Final result)  Result time 07/05/23 15:10:53      Final result by Viktor Desouza MD (07/05/23 15:10:53)                   Impression:      No evidence of acute intracranial pathology.    Volume loss again identified.    Electronically signed by resident: Kenney Rosas  Date:    07/05/2023  Time:    14:45    Electronically signed by: Viktor Desouza  Date:    07/05/2023  Time:    15:10               Narrative:    EXAMINATION:  CT HEAD WITHOUT CONTRAST    CLINICAL HISTORY:  Mental status change, unknown cause;    TECHNIQUE:  Low dose axial CT images obtained throughout the head without the use of intravenous contrast.  Axial, sagittal and coronal reconstructions were performed.    COMPARISON:  CT head 06/22/2023    FINDINGS:  Intracranial compartment:    Volume loss without evidence of hydrocephalus.    No parenchymal  hemorrhage, edema, mass effect or major vascular distribution infarct.    Decreased attenuation in the periventricular white matter is nonspecific but may reflect mild chronic small vessel ischemic change vs other encephalopathy.    No extra-axial blood or fluid collections.    Skull/extracranial contents (limited evaluation):    No displaced calvarial fracture.    The visualized sinuses and mastoid air cells are clear.                                       X-Ray Chest AP Portable (Final result)  Result time 07/05/23 13:09:11      Final result by Americo Reyes MD (07/05/23 13:09:11)                   Impression:      No significant detrimental interval change in the appearance of the chest since 06/25/2023 is appreciated, with significant improvement as noted above.  No post procedure pneumothorax.      Electronically signed by: Americo Reyes MD  Date:    07/05/2023  Time:    13:09               Narrative:    EXAMINATION:  XR CHEST AP PORTABLE    TECHNIQUE:  One view was obtained.  Note is made of the fact that the thorax is  obscured to some extent by opacities external to the patient, particularly defibrillator pads.    COMPARISON:  Comparison is made to the most recent prior chest radiograph of 06/25/2023.    FINDINGS:  Endotracheal tube has been placed, its tip lying just superior to the apex of the aortic arch, well above the katharina.  Left jugular origin vascular catheter is now seen, its tip in the superior vena cava near the junction of the right and left brachiocephalic veins.  Allowing for magnification of the cardiomediastinal silhouette related to projection, the heart is not significantly enlarged.  Opacity in both inferior hemithoraces seen on the previous examination has resolved, consistent with clearing of airspace consolidation in both mid/lower lung zones and resolution of previously present bilateral pleural fluid.  Lung zones are currently clear and free of significant airspace consolidation or volume loss.  No pleural fluid of any substantial volume is seen on either side.  No pneumothorax.

## 2023-08-02 NOTE — PT/OT/SLP PROGRESS
"Occupational Therapy   Treatment    Name: Marely Hamilton  MRN: [id number]  Admitting Diagnosis:  Gastrointestinal hemorrhage associated with duodenal ulcer  22 Days Post-Op    Recommendations:     Discharge Recommendations: assisted living facility, nursing facility, skilled  Discharge Equipment Recommendations:  other (see comments) (TBD)  Barriers to discharge:  Inaccessible home environment, Decreased caregiver support    Assessment:     Marely Hamilton is a 58 y.o. female with a medical diagnosis of Gastrointestinal hemorrhage associated with duodenal ulcer.  She presents with good participation, improved focus. She continues to be limited by confusion, anxiety, overall weakness, impaired endurance, impaired self care skills, impaired functional mobility, gait instability, impaired balance, impaired cognition, decreased upper extremity function, decreased lower extremity function, pain, impaired coordination, edema.     Rehab Prognosis:  Fair; patient would benefit from acute skilled OT services to address these deficits and reach maximum level of function.       Plan:     Patient to be seen 3 x/week to address the above listed problems via self-care/home management, therapeutic activities, therapeutic exercises  Plan of Care Expires: 08/11/23  Plan of Care Reviewed with: patient    Subjective     Chief Complaint: pain with touch to B LE but could not rate and had difficulty describing  Patient/Family Comments/goals: Pt stated, " my boyfriend plays in a band."  Pain/Comfort:  Pain Rating 1: 0/10  Pain Rating Post-Intervention 1: 0/10    Objective:     Communicated with: Nurse prior to session.  Patient found supine with baugh catheter, oxygen, telemetry, PRAFO upon OT entry to room.    General Precautions: Standard, fall    Orthopedic Precautions:N/A  Braces: N/A  Respiratory Status: Nasal cannula,     Occupational Performance:   Cognition:   Affect: anxious, pleasant, cooperative - She enjoys 70s rock music "   Attention: attending to task for about 5 min  Pt able to answer mostly yes/no questions  50 % of responses nonsensical        Bed Mobility:    Patient completed Supine to Sit with moderate assistance  Patient completed Sit to Supine with moderate assistance       Functional Mobility: Pt sat on edge of bed ~ 1 min, OT asked pt to scoot to edge of bed and place feet on the floor, Pt's anxiety increased and unable to complete. Pt returned to supine and able to calm down quickly with re-direction.    Activities of Daily Living:  Grooming: supervision and set-up  oral hygiene, washed face, and applied chap stick,. completed long sitting in bed, head of bed elevated.       [facility] 6 Click ADL: 11    Treatment & Education:    -Pt performed 3 hand grasp patterns following OT demonstration for about 5 min, to promote cognition and hand coordination for self care and mobility, but lost focus with increased difficulty.     -Pt educated to call for assistance and to transfer with hospital staff only  -Provided education regarding role of OT, POC, & discharge recommendations with pt verbalizing understanding.  Pt had no further questions & when asked whether there were any concerns pt reported none.     Patient left supine with all lines intact, call button in reach, and nurse notified    GOALS:   Multidisciplinary Problems       Occupational Therapy Goals          Problem: Occupational Therapy    Goal Priority Disciplines Outcome Interventions   Occupational Therapy Goal     OT, PT/OT Ongoing, Progressing    Description: Goals to be met by: 7/28/2023     Patient will increase functional independence with ADLs by performing:    UE Dressing with Minimal Assistance.  LE Dressing with Moderate Assistance.  Grooming while EOB with Minimal Assistance.  Toileting from bedside commode with Maximum Assistance for hygiene and clothing management.   Supine to sit with Moderate Assistance.  Stand pivot transfers with Maximum Assistance  with or without AD.  Toilet transfer to bedside commode with Maximum Assistance with or without AD.                         Time Tracking:     OT Date of Treatment: 08/02/23  OT Start Time: 1520  OT Stop Time: 1546  OT Total Time (min): 26 min    Billable Minutes:Therapeutic Activity 26    OT/DC: OT          8/2/2023

## 2023-08-02 NOTE — PLAN OF CARE
Problem: Infection  Goal: Absence of Infection Signs and Symptoms  Outcome: Ongoing, Progressing     Problem: Adult Inpatient Plan of Care  Goal: Plan of Care Review  Outcome: Ongoing, Progressing  Goal: Patient-Specific Goal (Individualized)  Outcome: Ongoing, Progressing  Goal: Absence of Hospital-Acquired Illness or Injury  Outcome: Ongoing, Progressing  Goal: Optimal Comfort and Wellbeing  Outcome: Ongoing, Progressing     Patient VS WNL. No distress at this time. No fall this shift. Call light within reach. Will continue to monitor.

## 2023-08-03 VITALS
OXYGEN SATURATION: 98 % | DIASTOLIC BLOOD PRESSURE: 64 MMHG | BODY MASS INDEX: 24.84 KG/M2 | RESPIRATION RATE: 19 BRPM | SYSTOLIC BLOOD PRESSURE: 119 MMHG | WEIGHT: 135 LBS | TEMPERATURE: 99 F | HEIGHT: 62 IN | HEART RATE: 76 BPM

## 2023-08-03 LAB
POCT GLUCOSE: 111 MG/DL (ref 70–110)
POCT GLUCOSE: 139 MG/DL (ref 70–110)
POCT GLUCOSE: 72 MG/DL (ref 70–110)
SARS-COV-2 RNA RESP QL NAA+PROBE: NOT DETECTED

## 2023-08-03 PROCEDURE — 94761 N-INVAS EAR/PLS OXIMETRY MLT: CPT

## 2023-08-03 PROCEDURE — 27000221 HC OXYGEN, UP TO 24 HOURS

## 2023-08-03 PROCEDURE — 87635 SARS-COV-2 COVID-19 AMP PRB: CPT | Performed by: INTERNAL MEDICINE

## 2023-08-03 PROCEDURE — 94640 AIRWAY INHALATION TREATMENT: CPT

## 2023-08-03 PROCEDURE — 25000003 PHARM REV CODE 250: Performed by: HOSPITALIST

## 2023-08-03 PROCEDURE — 99900035 HC TECH TIME PER 15 MIN (STAT)

## 2023-08-03 PROCEDURE — 20600001 HC STEP DOWN PRIVATE ROOM

## 2023-08-03 PROCEDURE — 99232 PR SUBSEQUENT HOSPITAL CARE,LEVL II: ICD-10-PCS | Mod: GC,,, | Performed by: PSYCHIATRY & NEUROLOGY

## 2023-08-03 PROCEDURE — 99232 SBSQ HOSP IP/OBS MODERATE 35: CPT | Mod: GC,,, | Performed by: PSYCHIATRY & NEUROLOGY

## 2023-08-03 PROCEDURE — 99238 PR HOSPITAL DISCHARGE DAY,<30 MIN: ICD-10-PCS | Mod: ,,, | Performed by: INTERNAL MEDICINE

## 2023-08-03 PROCEDURE — 25000242 PHARM REV CODE 250 ALT 637 W/ HCPCS: Performed by: HOSPITALIST

## 2023-08-03 PROCEDURE — 99238 HOSP IP/OBS DSCHRG MGMT 30/<: CPT | Mod: ,,, | Performed by: INTERNAL MEDICINE

## 2023-08-03 RX ADMIN — LEVETIRACETAM 1000 MG: 500 SOLUTION ORAL at 09:08

## 2023-08-03 RX ADMIN — PANTOPRAZOLE SODIUM 40 MG: 40 GRANULE, DELAYED RELEASE ORAL at 09:08

## 2023-08-03 RX ADMIN — RIFAXIMIN 550 MG: 550 TABLET ORAL at 09:08

## 2023-08-03 RX ADMIN — CIPROFLOXACIN HYDROCHLORIDE 500 MG: 500 TABLET, FILM COATED ORAL at 09:08

## 2023-08-03 RX ADMIN — IPRATROPIUM BROMIDE AND ALBUTEROL SULFATE 3 ML: 2.5; .5 SOLUTION RESPIRATORY (INHALATION) at 07:08

## 2023-08-03 RX ADMIN — IPRATROPIUM BROMIDE AND ALBUTEROL SULFATE 3 ML: 2.5; .5 SOLUTION RESPIRATORY (INHALATION) at 02:08

## 2023-08-03 RX ADMIN — LACTULOSE 30 G: 20 SOLUTION ORAL at 09:08

## 2023-08-03 NOTE — PROGRESS NOTES
"CONSULTATION LIAISON PSYCHIATRY PROGRESS NOTE    Patient Name: Marely Hamilton  MRN: [id number]  Patient Class: IP- Inpatient  Admission Date: 7/5/2023  Attending Physician: Jermain Coates MD      SUBJECTIVE:   Marely Hamilton is a 58 y.o. female with past psychiatric history of bipolar disorder, alcohol use disorder & past pertinent medical history of seizure disorder, alcohol induced hepatic cirrhosis presents to the ED/admitted to the hospital for Gastrointestinal hemorrhage associated with duodenal ulcer. Patient presented from SNF (when she experienced alida blood per rectum per chart review), for which she was recently discharged to when she presented to the hospital on for hepatic encephalopathy c/b retroperitoneal bleed (s/p ICV filter and IR embolization).     Psychiatry consulted for  medication management    Today, patient was seated in bed eating pudding. She stated that she was feeling better. She denied SI/HI/AVH. She was conversational with appropriate eye contact. She acknowledged that she was in a hospital with plans to discharge today. Patient was more linear than previous days, still with occasional bizarre statements.       OBJECTIVE:    Mental Status Exam:  General Appearance: dressed in hospital garb, lying in bed, thin and gaunt  Behavior: cooperative, pleasant, appropriate eye-contact, under good behavioral control  Involuntary Movements and Motor Activity: +tremors, +evidence of tardive dyskinesia  Gait and Station: unable to assess - patient lying down or seated  Speech and Language: spontaneous, fluent English, soft, varies in tone  Mood: "better"  Affect: flat, constricted  Thought Process and Associations: tangential  Thought Content and Perceptions:: no suicidal ideation, no homicidal ideation, no auditory hallucinations, no visual hallucinations  Sensorium and Orientation: oriented to person and place, oriented to year  Recent and Remote Memory: mild impairments noted 2/2 medical " condition  Attention and Concentration: attentive to conversation  Fund of Knowledge: Unable to Formally Assess  Insight:  Improving, impaired due to medical condition  Judgment: Improving, impaired due to medical condition    CAM ICU positive? no      ASSESSMENT & RECOMMENDATIONS   Bipolar disorder, current or most recent episode manic with psychotic feature    PSYCH MEDICATIONS  Scheduled: Continue lithium 300 mg nightly and Continue zyprexa 5 mg nightly  Does not appear to be requiring PRNs at this time, in future if patient does become agitated recommend Zyprexa 5 mg PO/IM.   Monitor QTc if patient requires PRN zyprexa.      DELIRIUM PRECAUTION BEHAVIOR MANAGEMENT  PLEASE utilize CHEMICAL restraints with PRN meds first for agitation. Minimize use of PHYSICAL restraints OR have periods of being out of physical restraints if possible.  Keep window shades open and room lit during day and room dim at night in order to promote normal sleep-wake cycles  Encourage family at bedside. [place] patient often to situation, location, date.  Continue to Limit or Discontinue use of Narcotics, Benzos and Anti-cholinergic medications as they may worsen delirium.  Continue medical workup for causative etiology of Delirium.      RISK ASSESSMENT  NO NEED FOR PEC patient NOT in any imminent danger of hurting self or others and not gravely disabled.      FOLLOW UP  Will follow up while in house     DISPOSITION - once medically cleared:   Defer to medical team    Please contact ON CALL psychiatry service (24/7) for any acute issues that may arise.    Dr. Mehdi VACA Psychiatry  [facility]  8/3/2023 10:02 AM        --------------------------------------------------------------------------------------------------------------------------------------------------------------------------------------------------------------------------------------    CONTINUED OBJECTIVE clinical data & findings reviewed and noted for  above decision making    Current Medications:   Scheduled Meds:    albuterol-ipratropium  3 mL Nebulization Q6H WAKE    ciprofloxacin HCl  500 mg Oral Q12H    lactulose  30 g Oral TID    levetiracetam  1,000 mg Oral BID    lithium  300 mg Oral QHS    OLANZapine  5 mg Oral QHS    pantoprazole  40 mg Oral BID    rifAXImin  550 mg Oral BID     PRN Meds: dextrose 10%, dextrose 10%, glucagon (human recombinant), insulin aspart U-100, oxyCODONE, sodium chloride 0.9%    Allergies:   Review of patient's allergies indicates:   Allergen Reactions    Sulfa (sulfonamide antibiotics) Rash    Codeine Nausea And Vomiting       Vitals  Vitals:    08/03/23 0758   BP: (!) 116/58   Pulse: 77   Resp: 18   Temp: 98.5 °F (36.9 °C)       Labs/Imaging/Studies:  Recent Results (from the past 24 hour(s))   POCT glucose    Collection Time: 08/02/23 11:55 AM   Result Value Ref Range    POCT Glucose 101 70 - 110 mg/dL   Calcium, ionized    Collection Time: 08/02/23  7:37 PM   Result Value Ref Range    Ionized Calcium 0.90 (L) 1.06 - 1.42 mmol/L   Lithium level    Collection Time: 08/02/23  7:37 PM   Result Value Ref Range    Lithium Level 0.1 (L) 0.6 - 1.2 mmol/L   POCT glucose    Collection Time: 08/03/23 12:07 AM   Result Value Ref Range    POCT Glucose 111 (H) 70 - 110 mg/dL     Imaging Results              CTA Acute GI [place], Abdomen and Pelvis (Final result)  Result time 07/05/23 17:15:19      Final result by Mumtaz Garsia MD (07/05/23 17:15:19)                   Impression:      Images are degraded by significant streak and motion artifacts.    Stable large left retroperitoneal hematoma and left iliacus hematoma.  No contrast extravasation within the hematomas or bowel to indicate active arterial bleed.    Hepatic cirrhosis.  Diffuse wall thickening of the stomach and colon, which can be seen in the setting of hepatic dysfunction.  However, superimposed inflammatory processes are not excluded.    Multiple, nondilated, distended  fluid-filled loops of small bowel without a definite transition point.  Stool and air seen within the colon, this is suggestive of ileus.    Small left pleural effusion with associated compressive atelectasis.    Cholelithiasis.    Cardiomegaly.    Electronically signed by resident: Emiliano Mcmahon  Date:    07/05/2023  Time:    16:29    Electronically signed by: Mumtaz Garsia MD  Date:    07/05/2023  Time:    17:15               Narrative:    EXAMINATION:  CTA ACUTE GI BLEED, ABDOMEN AND PELVIS    CLINICAL HISTORY:  GI bleed;    TECHNIQUE:  Initial noncontrast  images were obtained of the abdomen and pelvis.  CT axial angiography images were then obtained from the lung bases to the pubic symphysis following the intravenous administration of 100 of Omnipaque 350 with delayed images obtained per GI bleeding protocol.  Sagittal and coronal reformats were provided.    COMPARISON:  CTA GI bleed 06/13/2023    FINDINGS:  Images are degraded by significant streak and motion artifacts.    Lungs: Interval decrease in size of the small right pleural effusion with associated compressive atelectasis.  Resolution of the left pleural effusion.    Heart: Enlarged.  No pericardial effusion.    Liver: Nodular contour of the liver.  No focal abnormality.    Gallbladder: Multiple calcified gallstones.  No pericholecystic inflammatory changes.    Bile ducts: No intrahepatic or extrahepatic biliary ductal dilatation.    Spleen: Normal size.    Pancreas: No mass. No ductal dilatation. No peripancreatic fat stranding.    Adrenals: No significant abnormality    Renal/Ureters: Bilateral cortical thinning.  No hydronephrosis.  Proximal ureters are normal caliber.  The distal ureters are difficult to evaluate due to streak artifact.  Bladder is decompressed with Saldaña catheter in place.    Reproductive: Uterus appears without significant abnormality.  No adnexal mass, noting limited evaluation due to significant streak  artifact.    Stomach/Bowel: Stomach is distended with ingested contents with thickened rugal folds.  Small bowel is distended with multiple nondilated fluid-filled loops.  No transition point.  Appendix is normal.  Diffuse wall thickening throughout the colon with mild stool burden.  Diffuse wall thickening of the rectum.  No contrast extravasation to indicate active arterial bleed.    Peritoneum: Stable large left retroperitoneal hematoma measuring 11.5 x 7.8 x 12.6 cm.  No contrast extravasation to indicate active bleed within the hematoma.  Additional smaller hematoma anterior to the left iliacus muscle, which appears stable compared to prior CTA.  No free fluid. No intraperitoneal free air.    Lymph Nodes: Multiple prominent mesenteric and retroperitoneal lymph nodes.    Vasculature: Abdominal aorta tapers normally.  Mild atherosclerosis of the abdominal aorta and its branches.    Bones: Bony mineralization is diminished.  Left hip arthroplasty.  Generalized body wall edema.    Soft Tissues: No significant abnormality.                                       CT Head Without Contrast (Final result)  Result time 07/05/23 15:10:53      Final result by Viktor Desouza MD (07/05/23 15:10:53)                   Impression:      No evidence of acute intracranial pathology.    Volume loss again identified.    Electronically signed by resident: Kenney Rosas  Date:    07/05/2023  Time:    14:45    Electronically signed by: Viktor Desouza  Date:    07/05/2023  Time:    15:10               Narrative:    EXAMINATION:  CT HEAD WITHOUT CONTRAST    CLINICAL HISTORY:  Mental status change, unknown cause;    TECHNIQUE:  Low dose axial CT images obtained throughout the head without the use of intravenous contrast.  Axial, sagittal and coronal reconstructions were performed.    COMPARISON:  CT head 06/22/2023    FINDINGS:  Intracranial compartment:    Volume loss without evidence of hydrocephalus.    No parenchymal  hemorrhage,  edema, mass effect or major vascular distribution infarct.    Decreased attenuation in the periventricular white matter is nonspecific but may reflect mild chronic small vessel ischemic change vs other encephalopathy.    No extra-axial blood or fluid collections.    Skull/extracranial contents (limited evaluation):    No displaced calvarial fracture.    The visualized sinuses and mastoid air cells are clear.                                       X-Ray Chest AP Portable (Final result)  Result time 07/05/23 13:09:11      Final result by Americo Reyes MD (07/05/23 13:09:11)                   Impression:      No significant detrimental interval change in the appearance of the chest since 06/25/2023 is appreciated, with significant improvement as noted above.  No post procedure pneumothorax.      Electronically signed by: Americo Reyes MD  Date:    07/05/2023  Time:    13:09               Narrative:    EXAMINATION:  XR CHEST AP PORTABLE    TECHNIQUE:  One view was obtained.  Note is made of the fact that the thorax is obscured to some extent by opacities external to the patient, particularly defibrillator pads.    COMPARISON:  Comparison is made to the most recent prior chest radiograph of 06/25/2023.    FINDINGS:  Endotracheal tube has been placed, its tip lying just superior to the apex of the aortic arch, well above the katharina.  Left jugular origin vascular catheter is now seen, its tip in the superior vena cava near the junction of the right and left brachiocephalic veins.  Allowing for magnification of the cardiomediastinal silhouette related to projection, the heart is not significantly enlarged.  Opacity in both inferior hemithoraces seen on the previous examination has resolved, consistent with clearing of airspace consolidation in both mid/lower lung zones and resolution of previously present bilateral pleural fluid.  Lung zones are currently clear and free of significant airspace consolidation or volume loss.  No  pleural fluid of any substantial volume is seen on either side.  No pneumothorax.

## 2023-08-03 NOTE — PROGRESS NOTES
[facility] - Telemetry Stepdown  Wound Care    Patient Name:  Marely Hamilton   MRN:  [id number]  Date: 8/3/2023  Diagnosis: Gastrointestinal hemorrhage associated with duodenal ulcer    History:     Past Medical History:   Diagnosis Date    Addiction to drug     Alcohol abuse     Alcohol abuse, in remission 6/15/2015    14.5 weeks ago; AA weekly    Anemia     Anxiety 6/15/2015    Behavioral problem     Bipolar disorder     Bipolar disorder in remission 6/15/2015    Cirrhosis, Laennec's 6/15/2015    Depression     Encounter for blood transfusion     Epistaxis 6/15/2015    Fatigue     Glaucoma     Hematuria     Hepatic encephalopathy 6/15/2015    Hepatic enlargement     History of psychiatric care     History of psychiatric hospitalization     History of seizure 6/15/2015    1    hx of intentional Tylenol overdose 6/15/2015    2005- situational and hx of bipolar    Hx of psychiatric care     Macrocytic anemia 9/18/2015    6 units PRBC since June 2015    Jeana     Osteoarthritis     Other ascites 6/15/2015    Psychiatric exam requested by authority     Psychiatric problem     Psychosis 9/26/2019    Renal disorder     Seizures     Self-harming behavior     Suicide attempt     Therapy     Thrombocytopenia 6/15/2015       Social History     Socioeconomic History    Marital status: Single   Occupational History    Occupation: Disabled     Employer: DISABLED   Tobacco Use    Smoking status: Never    Smokeless tobacco: Never   Substance and Sexual Activity    Alcohol use: Not Currently     Comment: hx of ETOH abuse with cirrhosis of liver    Drug use: No    Sexual activity: Not Currently   Other Topics Concern    Patient feels they ought to cut down on drinking/drug use No    Patient annoyed by others criticizing their drinking/drug use No    Patient has felt bad or guilty about drinking/drug use No    Patient has had a drink/used drugs as an eye opener in the AM No   Social History Narrative    Pt has 1 older and 1 younger  sister.  Her father committed suicide when she was 18.  She has a [employer] degree and worked as an , but she has been disabled since age 40.  She never  and has no children.  She is not dating and claims no current friends.  She currently lives with her mother along with her pet dog.  She denies any hobbies and says she is not Bahai.     Social Determinants of Health     Financial Resource Strain: Unknown (5/29/2023)    Overall Financial Resource Strain (CARDIA)     Difficulty of Paying Living Expenses: Patient refused   Food Insecurity: Unknown (5/29/2023)    Hunger Vital Sign     Worried About Running Out of Food in the Last Year: Patient refused     Ran Out of Food in the Last Year: Patient refused   Transportation Needs: Unknown (5/29/2023)    PRAPARE - Transportation     Lack of Transportation (Medical): Patient refused     Lack of Transportation (Non-Medical): Patient refused   Physical Activity: Unknown (5/29/2023)    Exercise Vital Sign     Days of Exercise per Week: Patient refused     Minutes of Exercise per Session: Patient refused   Stress: Unknown (5/29/2023)    [place] [employer] [place] [employer] - Occupational Stress Questionnaire     Feeling of Stress : Patient refused   Recent Concern: Stress - Stress Concern Present (5/19/2023)    [employer] [employer] [place] [employer] - Occupational Stress Questionnaire     Feeling of Stress : To some extent   Social Connections: Unknown (5/29/2023)    Social Connection and Isolation Panel [NHANES]     Frequency of Communication with Friends and Family: Patient refused     Frequency of Social Gatherings with Friends and Family: Patient refused     Attends Religion Services: Patient refused     Active Member of Clubs or Organizations: Patient refused     Attends Club or Organization Meetings: Patient refused     Marital Status: Patient refused   Housing Stability: Unknown (5/29/2023)    Housing Stability Vital Sign     Unable to Pay for Housing  in the Last Year: Patient refused     Number of Places Lived in the Last Year: 1     Unstable Housing in the Last Year: Patient refused       Precautions:     Allergies as of 07/05/2023 - Reviewed 07/05/2023   Allergen Reaction Noted    Sulfa (sulfonamide antibiotics) Rash 11/26/2014    Codeine Nausea And Vomiting 04/26/2018       [facility] Assessment Details/Treatment   Patient seen for wound care consultation.   Reviewed chart for this encounter.   See Flow Sheet for findings.     The sacrum has a healing partial thickness skin loss from moisture associated dermatitis. The wound bed is pink and dry. The periwound is intact, pink, dry and blanchable.     RECOMMENDATIONS:  - Sacrum, buttocks and perineum: bedside nursing to cleanse with coloplast wipes or soap and water, pat dry and apply triad BID and PRN  - Turning every 2 hours  - Heel protecting boots  - Immerse mattress  - Nursing to maintain pressure injury prevention interventions     08/03/23 1153        Altered Skin Integrity 06/29/23 1523 Sacral spine Moisture associated dermatitis   Date First Assessed/Time First Assessed: 06/29/23 1523   Altered Skin Integrity Present on Admission - Did Patient arrive to the hospital with altered skin?: suspected hospital acquired  Location: Sacral spine  Primary Wound Type: Moisture associated ...   Wound Image    Dressing Appearance Dry;Intact   Drainage Amount None   Appearance Pink;Dry;Other (see comments)  (blanchable)   Tissue loss description Partial thickness   Periwound Area Intact;Dry;Pink;Other (see comments)  (blanchable)   Dressing Foam         Recommendations made to primary team for above plan via secure chat. Orders placed. Wound care to follow-up as needed.     08/03/2023

## 2023-08-03 NOTE — PLAN OF CARE
[facility] - Telemetry Stepdown  Discharge Final Note    Primary Care Provider: Viktor Ross MD    Expected Discharge Date: 8/3/2023    Final Discharge Note (most recent)       Final Note - 08/03/23 1413          Final Note    Assessment Type Final Discharge Note     Anticipated Discharge Disposition Skilled Nursing Facility   [facility]       Post-Acute Status    Post-Acute Authorization Placement     Post-Acute Placement Status Set-up Complete/Auth obtained   [facility] SNF    Discharge Delays None known at this time                     Important Message from Medicare  Important Message from Medicare regarding Discharge Appeal Rights: Given to patient/caregiver, Explained to patient/caregiver, Signed/date by patient/caregiver     Date IMM was signed: 08/03/23  Time IMM was signed: 1028    Mirela Cintron LCSW [facility]  Ext. [phone]

## 2023-08-03 NOTE — PLAN OF CARE
Sent updated SNF orders to [facility] in [facility]. Per Rere in admissions at [facility], updated insurance auth is pending. Escalated insurance auth request to leadership. Covid test result is pending. Updated patient's sister, Zaria.    1:37 PM  Sent covid test result to [facility] in [facility] and notified Rere who is verifying if they have received insurance auth from [facility].    2:10 PM   08/03/23 1410   Post-Acute Status   Post-Acute Authorization Placement   Post-Acute Placement Status Set-up Complete/Auth obtained   Per Rere at [facility], nurse can call report to [phone], patient will go to room 812A, and SW can schedule transportation. Updated RN and MD.    [facility] arranged stretcher transport via [facility]. Requested  time is 3:00 PM. Requested  time does not guarantee arrival time.    Updated patient's sister, Zaria.    Mirela Cintron, LCSW [facility]  Ext. [phone]

## 2023-08-03 NOTE — SUBJECTIVE & OBJECTIVE
Interval History: MS seems to be at new baseline, will dc to NH today.    Review of Systems  Objective:     Vital Signs (Most Recent):  Temp: 98.5 °F (36.9 °C) (08/03/23 0758)  Pulse: 77 (08/03/23 0758)  Resp: 18 (08/03/23 0758)  BP: (!) 116/58 (08/03/23 0758)  SpO2: 98 % (08/03/23 0758) Vital Signs (24h Range):  Temp:  [97.7 °F (36.5 °C)-98.8 °F (37.1 °C)] 98.5 °F (36.9 °C)  Pulse:  [75-96] 77  Resp:  [16-20] 18  SpO2:  [94 %-100 %] 98 %  BP: (101-131)/(58-79) 116/58     Weight: 61.2 kg (135 lb)  Body mass index is 24.69 kg/m².    Intake/Output Summary (Last 24 hours) at 8/3/2023 1006  Last data filed at 8/3/2023 0646  Gross per 24 hour   Intake 540 ml   Output 800 ml   Net -260 ml         Physical Exam  Constitutional:       Appearance: She is ill-appearing and toxic-appearing.   HENT:      Head: Normocephalic.   Eyes:      General: Scleral icterus present.   Neck:      Thyroid: No thyroid mass or thyroid tenderness.      Vascular: No carotid bruit or hepatojugular reflux.   Cardiovascular:      Rate and Rhythm: Normal rate.      Pulses: Decreased pulses.      Heart sounds: Heart sounds are distant.   Chest:      Chest wall: No mass, lacerations or deformity.   Breasts:     Right: No swelling.      Left: No swelling.   Abdominal:      General: Abdomen is flat.      Palpations: Abdomen is rigid.      Hernia: No hernia is present.   Genitourinary:     Vagina: Normal.   Musculoskeletal:      Cervical back: Rigidity present.   Lymphadenopathy:      Cervical: No cervical adenopathy.   Skin:     Coloration: Skin is jaundiced.      Findings: Ecchymosis present.   Neurological:      Mental Status: She is unresponsive.             Significant Labs: All pertinent labs within the past 24 hours have been reviewed.  BMP:   Recent Labs   Lab 08/02/23  0504         K 4.1   *   CO2 18*   BUN <3*   CREATININE 0.4*   CALCIUM 7.5*   MG 1.6       Significant Imaging: I have reviewed all pertinent imaging  results/findings within the past 24 hours.

## 2023-08-03 NOTE — NURSING
Removed patient PIV. Patient verbalized understanding of discharge instructions.SW arranged stretcher transport via [facility].

## 2023-08-03 NOTE — PROGRESS NOTES
[facility] - Telemetry [facility] Medicine  Progress Note    Patient Name: Marely Hamilton  MRN: [id number]  Patient Class: IP- Inpatient   Admission Date: 7/5/2023  Length of Stay: 29 days  Attending Physician: Jermain Coates MD  Primary Care Provider: Viktor Ross MD        Subjective:     Principal Problem:Gastrointestinal hemorrhage associated with duodenal ulcer  Acute Condition: MS johnson        HPI:  Marely Hamilton is a 58 y.o. female with history of alcoholic cirrhosis, seizure disorder, DVT and IJ thrombus with recent admission for large retroperitoneal hemorrhage requiring IR embolization and IVC filter placement who presents for hematochezia. Onset this morning at [facility], alida blood per rectum per chart, sent to ED. Initially normotensive but then severe hypotension prompted initiation of levophed and intubation. Hgb 6.8, 2u pRBC given + 1u FFP ordered. Hgb 8.5 on 7/2. On levo and vaso currently. K 6.6 but renal function at baseline. Repeat pending. No prior EGDs on record.          Overview/Hospital Course:    Patient was seen at bedside, hematochezia still present post op by IR. Patient has required many transfusions of pRBCs and platelets due to ongoing down trending of Hgb. No clear source of ongoing hemorrhage by imaging. Patient has continued to require pressors to maintain MAP >65. Discussed with GI and IR regarding active bleeding post op, IR indicated there is not much else to do from their end bc they embolized a significant part of GDA. Pt is off of pressor requirement and not actively bleeding. GI re evaluated pt via EGD and found no sources of active bleeding. Pt is extubated and currently on BIPAP requirement due to de saturation in the 80's. Pt is to be seen by speech and PT/OT. Clear mucinous secretions via suction, chest physiotherapy, and cough assist device. Nebs to help with breathing as well. Pt is off BIPAP and currently on comfort flow. Triple therapy (amoxicillin,  clarithromycin) started for 14 days to treat for positive H pylori serology. Pt also had a NG tube placed so we may begin tube feedings. CxR, EKG, and ABG displayed no reason for tachycardia. Pt becomes anxious when seen by different provider teams. Remove art line and consult for midline placement. Continue to wean comfort flow as tolerated. Ensure pt is working with PT/OT/Speech for continuous improvement. Consult psych regarding patient history of bipolar disorder.         Interval History/Significant Events: Wean comfort flow as tolerated. Ensure patient is working with PT/OT/Speech for continuous improvement. Consult psych regarding psych history     7/16   history of alcoholic cirrhosis, seizure disorder, DVT and IJ thrombus with recent admission for large retroperitoneal hemorrhage requiring IR embolization and IVC filter placement who presents for hematochezia. s/p GI and IR in which they clipped (GI) and embolized (IR) possible sources of duodenal ulcer bleeding.  Hb stable  s/p extubation. sats 92% on 5L of NC.  CX ray - Detrimental changes since the previous examination including interval increase in pulmonary vascularity and bilateral diffuse interstitial pulmonary parenchymal opacification, progression of abnormal opacification at the left lung base, and development of small right-sided pleural effusion.   findings would be consistent with congestive heart failure.PT/OT recs SNF. BNP , procalcitonin and Echo.  SLP recs NPO .  On NGT feeds. psychiatry following for bipolar disorders.  Recs Zyprexa 5 mg QHS . Ammonia 48 WNL. AAOX 1. Hepatology consulted for cirrhosis. UA ordered   7/17 Saldaña removed. UA +. UC pending. started on IV ceftriaxone. ammonia at 59. on B/L UE mittens as pulled of NGT. Hepatology eval. resumed lactulose . discussed with sister and updated clinical status   7/18  MBS today - started puree nectar thick liquids.  on oxygen 3L via NC.   wean  as tolerated . UC -YEAST 10,000 - 49,999  cfu/ml Identification pending No other significant isolate. hydroxyzine  PRN discontinued  due to it not being safe in delirium. oriented to person, hospital and year   7/19 SLP recs - tolerating Puree Diet - IDDSI Level 4, Mildly thick/Nectar thick liquids - IDDSI Level 2 .stool output 600ml/ monitor on lactulose . improved mentation AAOX 3  7/26 Await NH placement. Little improvement in functional status. Tunneled fistula seen near rectum. Low grade temp.  7/26 K replaced. Await NH placement. Little improvement in functional status. Tunneled fistula seen near rectum. Low grade temp. Consulted psych for recs concerning discharge.   7/27 Sister wanted her started back on lithium but psychiatry recommended  increasing Zyprexa 7.5mg QHS. EKG to monitor QtcDiscontinue zyprexa if Qtc >480. EKG QTC 493ms.  Zyprexa resumed at  zyprexa 5 nightly per psychiatry . Urinary retention this AM s/p straight cath   7/28 continues with confusion. UA + . UC pending. continue ceftriaxone. no fistula found on scaral exam with wound care.  Repeat EKG today to monitor Qtc. Saldaña catheter placed for urinary retention .  started  lithium 300 mg  nightly due to concern for manic episode  7/29 SLP recs - Mechanical soft, Thin . UC - GRAM NEGATIVE BLILY 10,000 - 49,999 cfu/ml  Identification and susceptibility pending  .ammonia elevated at 70. lactulose increased to 30g TID .started rifaximin BID   7/30 BMX 1 documented . pending mental status improvement   7/31 UC with klebsialla penumoniae and Aerogenes resisant to ceftriaxone. started on ciprofloxacin . sodium bicarbonate for bicarb of 16   8/1 K replaced. ammonia trending down . QTc  458ms . improved mentation today. oriented to date and month   8/2 Improving mentation . 3 BMs yesterday         Interval History: MS seems to be at new baseline, will dc to NH today.    Review of Systems  Objective:     Vital Signs (Most Recent):  Temp: 98.5 °F (36.9 °C) (08/03/23 0758)  Pulse: 77 (08/03/23  0758)  Resp: 18 (08/03/23 0758)  BP: (!) 116/58 (08/03/23 0758)  SpO2: 98 % (08/03/23 0758) Vital Signs (24h Range):  Temp:  [97.7 °F (36.5 °C)-98.8 °F (37.1 °C)] 98.5 °F (36.9 °C)  Pulse:  [75-96] 77  Resp:  [16-20] 18  SpO2:  [94 %-100 %] 98 %  BP: (101-131)/(58-79) 116/58     Weight: 61.2 kg (135 lb)  Body mass index is 24.69 kg/m².    Intake/Output Summary (Last 24 hours) at 8/3/2023 1006  Last data filed at 8/3/2023 0646  Gross per 24 hour   Intake 540 ml   Output 800 ml   Net -260 ml         Physical Exam  Constitutional:       Appearance: She is ill-appearing and toxic-appearing.   HENT:      Head: Normocephalic.   Eyes:      General: Scleral icterus present.   Neck:      Thyroid: No thyroid mass or thyroid tenderness.      Vascular: No carotid bruit or hepatojugular reflux.   Cardiovascular:      Rate and Rhythm: Normal rate.      Pulses: Decreased pulses.      Heart sounds: Heart sounds are distant.   Chest:      Chest wall: No mass, lacerations or deformity.   Breasts:     Right: No swelling.      Left: No swelling.   Abdominal:      General: Abdomen is flat.      Palpations: Abdomen is rigid.      Hernia: No hernia is present.   Genitourinary:     Vagina: Normal.   Musculoskeletal:      Cervical back: Rigidity present.   Lymphadenopathy:      Cervical: No cervical adenopathy.   Skin:     Coloration: Skin is jaundiced.      Findings: Ecchymosis present.   Neurological:      Mental Status: She is unresponsive.             Significant Labs: All pertinent labs within the past 24 hours have been reviewed.  BMP:   Recent Labs   Lab 08/02/23  0504         K 4.1   *   CO2 18*   BUN <3*   CREATININE 0.4*   CALCIUM 7.5*   MG 1.6       Significant Imaging: I have reviewed all pertinent imaging results/findings within the past 24 hours.      Assessment/Plan:      * Gastrointestinal hemorrhage associated with duodenal ulcer    as  above        Urinary retention  7/27  2 episodes. s/p straight cath    7/28 Saldaña catheter placed for urinary retention         Irritant contact dermatitis due to fecal, urinary or dual incontinence  wound care eval      Dysphagia  7/18  MBS today -started puree nectar thick liquids.    7/29 SLP recs - Mechanical soft, Thin  liquids     Hypoxia   7/16 sats 92% on 5L of NC.  CX ray - Detrimental changes since the previous examination including interval increase in pulmonary vascularity and bilateral diffuse interstitial pulmonary parenchymal opacification, progression of abnormal opacification at the left lung base, and development of small right-sided pleural effusion.   findings would be consistent with congestive heart failure.PT/OT recs SNF. BNP , procalcitonin and Echo.   7/27 Sats 97% on RA    H. pylori infection    positive serology for H pylori, begin triple therapy with clarithromycin,amoxicillin, and PPI for 14 days       Hematochezia    - GI EGD clipped duodenal ulcers for IR reference  - Transfuse blood products for active bleeding   - trend CBC and follow  - IR embolized GDA for duodenal hyperemia   -- GI re evaluated site of duodenal ulcers, no noted active bleeding  -- Continue PPI BID for 8 weeks, then once daily after that  --Pt had positive serology for H pylori, had triple therapy with clarithromycin,amoxicillin, and PPI for 14 days      Acute blood loss anemia     - Maintain IV access with 2 large bore Ivs  - Intravascular resuscitation/support with IVFs   - Hold all NSAIDs and anticoagulants, unless contraindicated.  - Please correct any coagulopathy with platelets and FFP for goal of platelets >50K and INR <2.0  - Please notify GI team if there is significant change in patient's clinical status  - To date, patient has required at least 21 units of pRBCs to maintain Hgb >7     7/16     Patient's with macrocytic anemia.. Hemoglobin stable. Etiology likely due to acute blood loss.  Current CBC reviewed-    Recent Labs   Lab 07/31/23  0444 08/01/23  1590  08/02/23  0504   HGB 8.8* 8.2* 8.7*         Component Value Date/Time     (H) 08/02/2023 0504    RDW 23.4 (H) 08/02/2023 0504    IRON 132 05/18/2023 1527    FERRITIN 110 05/18/2023 1527    RETIC 3.9 (H) 06/05/2023 0935    FOLATE 11.5 07/17/2023 0619    [id number] 947 07/17/2023 0619    OCCULTBLOOD Negative 09/19/2015 0137     Monitor CBC and transfuse if H/H drops below 7/21.        Retroperitoneal bleed  Retroperitoneum: No significant adenopathy.  Similar sized left retroperitoneal hematoma measuring 11 x 9 cm (series 5, image 100; previously 11 x 9 cm).  The left iliacus collection also measures similar to prior at 5.4 cm (series 5, image 127; previously 5.6 cm).  Layering hyperdensity within these collections suggesting different ages in blood products.  No significant volume active extravasation into these collections  - CT-A demonstrated stable retroperitoneal hematoma. No need for intervention at this time.          Supratherapeutic INR  patient with INR   Recent Labs   Lab 07/23/23  0434   INR 1.5*   . INR is supratherapeutic. secondary to coagulopathy from liver disease.monitor      Hypokalemia  replaced       UTI (urinary tract infection)  7/17 Saldaña removed. UA +. UC pending. started on IV ceftriaxone.  7/18 UC -YEAST 10,000 - 49,999 cfu/ml Identification pending No other significant isolate  7/28 continues with confusion. UA + . UC pending. continue ceftriaxone. no fistula found on sacral exam with wound care.   7/29   UC - GRAM NEGATIVE BILLY 10,000 - 49,999 cfu/ml  Identification and susceptibility pending     7/31 UC with klebsialla penumoniae and Aerogenes resisant to ceftriaxone. started on ciprofloxacin .    Bipolar 1 disorder     - Consult psych regarding Bipolar 1 disorder history  7/27 Sister wanted her started back on lithium but psychiatry recommended  increasing Zyprexa 7.5mg QHS. EKG to monitor Qtc.Discontinue zyprexa if Qtc >480.  EKG QTC 493ms.  Zyprexa swtiched to 5mg HS  7/28   started  lithium 300 mg  nightly due to concern for manic episode       Hepatic encephalopathy   ammonia 190s on admit -->90s . AAOX 1. no lactulose give due to GI bleed  repeat ammonia. Hepatolog eval  with     MELD 3.0: 19 at 7/25/2023  2:48 AM  MELD-Na: 15 at 7/25/2023  2:48 AM  Calculated from:  Serum Creatinine: 0.3 mg/dL (Using min of 1 mg/dL) at 7/25/2023  2:48 AM  Serum Sodium: 143 mmol/L (Using max of 137 mmol/L) at 7/25/2023  2:48 AM  Total Bilirubin: 3.1 mg/dL at 7/25/2023  2:48 AM  Serum Albumin: 1.8 g/dL at 7/25/2023  2:48 AM  INR(ratio): 1.5 at 7/23/2023  4:34 AM  Age at listing (hypothetical): 58 years  Sex: Female at 7/25/2023  2:48 AM  7/16 Ammonia 48 WNL. AAOX 1. Hepatology consulted for cirrhosis.    7/17  Hepatology eval. resumed lactulose.  7/18  hydroxyzine  PRN  discontinued  due to it not being safe in delirium  7/29 ammonia elevated at 70. lactulose increased to 30g TID.  started rifaximin BID     Severe protein-calorie malnutrition  Nutrition consulted. Most recent weight and BMI monitored-     Measurements:  Wt Readings from Last 1 Encounters:   07/27/23 61.2 kg (135 lb)   Body mass index is 24.69 kg/m².    Patient has been screened and assessed by RD.    Malnutrition Type:  Context: social/environmental circumstances  Level: severe    Malnutrition Characteristic Summary:  Energy Intake (Malnutrition): less than or equal to 75% for greater than or equal to 1 month  Subcutaneous Fat (Malnutrition): severe depletion  Muscle Mass (Malnutrition): severe depletion      History of seizure  on Keppra       Thrombocytopenia  with cirrhosis   Patient with thrombocytopenia   Recent Labs   Lab 07/31/23  0444 08/01/23  0501 08/02/23  0504   PLT 58* 62* 60*   . Platelet counts stable  .monitor      Cirrhosis, Capri's  alcoholic cirrhosis  MELD 3.0: 19 at 7/25/2023  2:48 AM  MELD-Na: 15 at 7/25/2023  2:48 AM  Calculated from:  Serum Creatinine: 0.3 mg/dL (Using min of 1 mg/dL) at 7/25/2023  2:48  AM  Serum Sodium: 143 mmol/L (Using max of 137 mmol/L) at 7/25/2023  2:48 AM  Total Bilirubin: 3.1 mg/dL at 7/25/2023  2:48 AM  Serum Albumin: 1.8 g/dL at 7/25/2023  2:48 AM  INR(ratio): 1.5 at 7/23/2023  4:34 AM  Age at listing (hypothetical): 58 years  Sex: Female at 7/25/2023  2:48 AM     No results for input(s): AMMONIA in the last 168 hours.  Strict input /output monitor, daily weights        VTE Risk Mitigation (From admission, onward)         Ordered     Place sequential compression device  Until discontinued         07/14/23 1024     Reason for No Pharmacological VTE Prophylaxis  Once        Question:  Reasons:  Answer:  Active Bleeding    07/05/23 1334     IP VTE HIGH RISK PATIENT  Once         07/05/23 1334                Discharge Planning   BRAYAN: 8/3/2023     Code Status: DNR   Is the patient medically ready for discharge?: No    Reason for patient still in hospital (select all that apply): Patient unstable  Discharge Plan A: Skilled Nursing Facility   Discharge Delays: None known at this time              Jermain Coates MD  Department of Hospital Medicine   [facility] - Telemetry Stepdown
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
## 2023-08-03 NOTE — PT/OT/SLP PROGRESS
Occupational Therapy      Patient Name:  Marely Hamilton   MRN:  [id number]    Patient not seen today secondary to OT POC met.  Will follow-up according to POC.    8/3/2023    DUYEN Mack/BRIEN

## 2023-08-03 NOTE — NURSING
Pt refused 0600 blood glucose. Pulled hand away from nurse when attempting finger stick and became agitated.

## 2023-08-03 NOTE — PLAN OF CARE
CHW met with patient/family at bedside. Patient experience rounding completed and reviewed the following.     Do you know your discharge plan? Yes,    If yes, what is the plan? SNF     Have you discussed your needs and preferences with your SW/CM? Yes    If you are discharging home, do you have help at home? SNF    Do you think you will need help additional at home at discharge? SNF     Do you currently have difficulty keeping up with bills, affording medicine or buying food? SNF    Assigned SW/CM notified of any patient/family needs or concerns. Appropriate resources provided to address patient's needs.

## 2023-08-10 NOTE — ADDENDUM NOTE
Addendum  created 08/10/23 1536 by Celestina Ortega DO    Clinical Note Signed, Intraprocedure Blocks edited, SmartForm saved

## 2023-08-14 ENCOUNTER — HOSPITAL ENCOUNTER (INPATIENT)
Facility: HOSPITAL | Age: 58
LOS: 4 days | Discharge: NURSING, REHAB OR LONG-TERM CARE | DRG: 871 | End: 2023-08-18
Attending: EMERGENCY MEDICINE | Admitting: FAMILY MEDICINE
Payer: MEDICARE

## 2023-08-14 DIAGNOSIS — K65.2 SBP (SPONTANEOUS BACTERIAL PERITONITIS): Primary | ICD-10-CM

## 2023-08-14 DIAGNOSIS — E88.09 HYPOALBUMINEMIA: ICD-10-CM

## 2023-08-14 DIAGNOSIS — A41.9 SEPSIS: ICD-10-CM

## 2023-08-14 DIAGNOSIS — A41.9 SEVERE SEPSIS: ICD-10-CM

## 2023-08-14 DIAGNOSIS — D69.6 THROMBOCYTOPENIA: ICD-10-CM

## 2023-08-14 DIAGNOSIS — R07.9 CHEST PAIN: ICD-10-CM

## 2023-08-14 DIAGNOSIS — D64.9 ANEMIA, UNSPECIFIED TYPE: ICD-10-CM

## 2023-08-14 DIAGNOSIS — K76.9 LIVER DISEASE, CHRONIC, WITH CIRRHOSIS: ICD-10-CM

## 2023-08-14 DIAGNOSIS — R78.81 GRAM-NEGATIVE BACTEREMIA: ICD-10-CM

## 2023-08-14 DIAGNOSIS — R18.8 OTHER ASCITES: ICD-10-CM

## 2023-08-14 DIAGNOSIS — J81.1 PULMONARY EDEMA: ICD-10-CM

## 2023-08-14 DIAGNOSIS — E72.20 HYPERAMMONEMIA: ICD-10-CM

## 2023-08-14 DIAGNOSIS — D72.819 LEUKOPENIA, UNSPECIFIED TYPE: ICD-10-CM

## 2023-08-14 DIAGNOSIS — G93.40 ACUTE ENCEPHALOPATHY: ICD-10-CM

## 2023-08-14 DIAGNOSIS — K70.30 CIRRHOSIS, LAENNEC'S: ICD-10-CM

## 2023-08-14 DIAGNOSIS — D68.9 COAGULOPATHY: ICD-10-CM

## 2023-08-14 DIAGNOSIS — R41.0 CONFUSION: ICD-10-CM

## 2023-08-14 DIAGNOSIS — R65.20 SEVERE SEPSIS: ICD-10-CM

## 2023-08-14 DIAGNOSIS — N30.00 ACUTE CYSTITIS WITHOUT HEMATURIA: ICD-10-CM

## 2023-08-14 DIAGNOSIS — K74.60 LIVER DISEASE, CHRONIC, WITH CIRRHOSIS: ICD-10-CM

## 2023-08-14 DIAGNOSIS — K76.82 HEPATIC ENCEPHALOPATHY: ICD-10-CM

## 2023-08-14 PROBLEM — R41.82 AMS (ALTERED MENTAL STATUS): Status: ACTIVE | Noted: 2023-08-14

## 2023-08-14 PROBLEM — J18.9 PNEUMONIA: Status: ACTIVE | Noted: 2023-08-14

## 2023-08-14 LAB
ALBUMIN SERPL BCP-MCNC: 1.9 G/DL (ref 3.5–5.2)
ALP SERPL-CCNC: 119 U/L (ref 55–135)
ALT SERPL W/O P-5'-P-CCNC: 12 U/L (ref 10–44)
AMMONIA PLAS-SCNC: 55 UMOL/L (ref 10–50)
ANION GAP SERPL CALC-SCNC: 6 MMOL/L (ref 8–16)
ANISOCYTOSIS BLD QL SMEAR: SLIGHT
APTT PPP: 38.4 SEC (ref 21–32)
AST SERPL-CCNC: 28 U/L (ref 10–40)
BACTERIA #/AREA URNS HPF: ABNORMAL /HPF
BASOPHILS # BLD AUTO: 0.01 K/UL (ref 0–0.2)
BASOPHILS NFR BLD: 0.3 % (ref 0–1.9)
BILIRUB SERPL-MCNC: 4.9 MG/DL (ref 0.1–1)
BILIRUB UR QL STRIP: NEGATIVE
BUN SERPL-MCNC: 8 MG/DL (ref 6–20)
BURR CELLS BLD QL SMEAR: ABNORMAL
CALCIUM SERPL-MCNC: 7.7 MG/DL (ref 8.7–10.5)
CHLORIDE SERPL-SCNC: 108 MMOL/L (ref 95–110)
CLARITY UR: ABNORMAL
CO2 SERPL-SCNC: 18 MMOL/L (ref 23–29)
COLOR UR: YELLOW
CREAT SERPL-MCNC: 0.5 MG/DL (ref 0.5–1.4)
CRP SERPL-MCNC: 50.6 MG/L (ref 0–8.2)
DIFFERENTIAL METHOD: ABNORMAL
EOSINOPHIL # BLD AUTO: 0 K/UL (ref 0–0.5)
EOSINOPHIL NFR BLD: 0.3 % (ref 0–8)
ERYTHROCYTE [DISTWIDTH] IN BLOOD BY AUTOMATED COUNT: 18.2 % (ref 11.5–14.5)
EST. GFR  (NO RACE VARIABLE): >60 ML/MIN/1.73 M^2
GLUCOSE SERPL-MCNC: 112 MG/DL (ref 70–110)
GLUCOSE UR QL STRIP: NEGATIVE
HCT VFR BLD AUTO: 28 % (ref 37–48.5)
HGB BLD-MCNC: 9 G/DL (ref 12–16)
HGB UR QL STRIP: ABNORMAL
HYALINE CASTS #/AREA URNS LPF: 0 /LPF
IMM GRANULOCYTES # BLD AUTO: 0.03 K/UL (ref 0–0.04)
IMM GRANULOCYTES NFR BLD AUTO: 0.9 % (ref 0–0.5)
INFLUENZA A, MOLECULAR: NEGATIVE
INFLUENZA B, MOLECULAR: NEGATIVE
INR PPP: 1.9 (ref 0.8–1.2)
KETONES UR QL STRIP: NEGATIVE
LACTATE SERPL-SCNC: 2.4 MMOL/L (ref 0.5–2.2)
LEUKOCYTE ESTERASE UR QL STRIP: ABNORMAL
LIPASE SERPL-CCNC: 18 U/L (ref 4–60)
LYMPHOCYTES # BLD AUTO: 0.3 K/UL (ref 1–4.8)
LYMPHOCYTES NFR BLD: 7.2 % (ref 18–48)
MAGNESIUM SERPL-MCNC: 1.8 MG/DL (ref 1.6–2.6)
MCH RBC QN AUTO: 34 PG (ref 27–31)
MCHC RBC AUTO-ENTMCNC: 32.1 G/DL (ref 32–36)
MCV RBC AUTO: 106 FL (ref 82–98)
MICROSCOPIC COMMENT: ABNORMAL
MONOCYTES # BLD AUTO: 0.2 K/UL (ref 0.3–1)
MONOCYTES NFR BLD: 6 % (ref 4–15)
NEUTROPHILS # BLD AUTO: 3 K/UL (ref 1.8–7.7)
NEUTROPHILS NFR BLD: 85.3 % (ref 38–73)
NITRITE UR QL STRIP: POSITIVE
NRBC BLD-RTO: 0 /100 WBC
PH UR STRIP: 7 [PH] (ref 5–8)
PLATELET # BLD AUTO: 58 K/UL (ref 150–450)
PLATELET BLD QL SMEAR: ABNORMAL
PMV BLD AUTO: 10.3 FL (ref 9.2–12.9)
POTASSIUM SERPL-SCNC: 3.5 MMOL/L (ref 3.5–5.1)
PROCALCITONIN SERPL IA-MCNC: 0.64 NG/ML
PROT SERPL-MCNC: 5.6 G/DL (ref 6–8.4)
PROT UR QL STRIP: ABNORMAL
PROTHROMBIN TIME: 20.3 SEC (ref 9–12.5)
RBC # BLD AUTO: 2.65 M/UL (ref 4–5.4)
RBC #/AREA URNS HPF: 9 /HPF (ref 0–4)
SARS-COV-2 RDRP RESP QL NAA+PROBE: NEGATIVE
SCHISTOCYTES BLD QL SMEAR: ABNORMAL
SODIUM SERPL-SCNC: 132 MMOL/L (ref 136–145)
SP GR UR STRIP: 1.01 (ref 1–1.03)
SPECIMEN SOURCE: NORMAL
TOXIC GRANULES BLD QL SMEAR: PRESENT
TSH SERPL DL<=0.005 MIU/L-ACNC: 0.94 UIU/ML (ref 0.4–4)
URN SPEC COLLECT METH UR: ABNORMAL
UROBILINOGEN UR STRIP-ACNC: NEGATIVE EU/DL
WBC # BLD AUTO: 3.21 K/UL (ref 3.9–12.7)
WBC #/AREA URNS HPF: 87 /HPF (ref 0–5)
WBC TOXIC VACUOLES BLD QL SMEAR: PRESENT
YEAST URNS QL MICRO: ABNORMAL

## 2023-08-14 PROCEDURE — 87040 BLOOD CULTURE FOR BACTERIA: CPT | Mod: 59 | Performed by: EMERGENCY MEDICINE

## 2023-08-14 PROCEDURE — 99223 PR INITIAL HOSPITAL CARE,LEVL III: ICD-10-PCS | Mod: ,,, | Performed by: PHYSICIAN ASSISTANT

## 2023-08-14 PROCEDURE — 83735 ASSAY OF MAGNESIUM: CPT | Performed by: EMERGENCY MEDICINE

## 2023-08-14 PROCEDURE — 87502 INFLUENZA DNA AMP PROBE: CPT

## 2023-08-14 PROCEDURE — 63600175 PHARM REV CODE 636 W HCPCS: Performed by: EMERGENCY MEDICINE

## 2023-08-14 PROCEDURE — 84145 PROCALCITONIN (PCT): CPT | Performed by: EMERGENCY MEDICINE

## 2023-08-14 PROCEDURE — 87502 INFLUENZA DNA AMP PROBE: CPT | Performed by: EMERGENCY MEDICINE

## 2023-08-14 PROCEDURE — 63600175 PHARM REV CODE 636 W HCPCS: Performed by: FAMILY MEDICINE

## 2023-08-14 PROCEDURE — 96365 THER/PROPH/DIAG IV INF INIT: CPT

## 2023-08-14 PROCEDURE — 87088 URINE BACTERIA CULTURE: CPT | Performed by: EMERGENCY MEDICINE

## 2023-08-14 PROCEDURE — 83690 ASSAY OF LIPASE: CPT | Performed by: FAMILY MEDICINE

## 2023-08-14 PROCEDURE — 87086 URINE CULTURE/COLONY COUNT: CPT | Performed by: EMERGENCY MEDICINE

## 2023-08-14 PROCEDURE — 93005 ELECTROCARDIOGRAM TRACING: CPT

## 2023-08-14 PROCEDURE — 83605 ASSAY OF LACTIC ACID: CPT | Performed by: EMERGENCY MEDICINE

## 2023-08-14 PROCEDURE — 86140 C-REACTIVE PROTEIN: CPT | Performed by: EMERGENCY MEDICINE

## 2023-08-14 PROCEDURE — 85025 COMPLETE CBC W/AUTO DIFF WBC: CPT | Performed by: EMERGENCY MEDICINE

## 2023-08-14 PROCEDURE — A4216 STERILE WATER/SALINE, 10 ML: HCPCS | Performed by: FAMILY MEDICINE

## 2023-08-14 PROCEDURE — 81000 URINALYSIS NONAUTO W/SCOPE: CPT | Performed by: EMERGENCY MEDICINE

## 2023-08-14 PROCEDURE — 93010 EKG 12-LEAD: ICD-10-PCS | Mod: ,,, | Performed by: INTERNAL MEDICINE

## 2023-08-14 PROCEDURE — 93010 ELECTROCARDIOGRAM REPORT: CPT | Mod: ,,, | Performed by: INTERNAL MEDICINE

## 2023-08-14 PROCEDURE — 99223 1ST HOSP IP/OBS HIGH 75: CPT | Mod: ,,, | Performed by: PHYSICIAN ASSISTANT

## 2023-08-14 PROCEDURE — 85610 PROTHROMBIN TIME: CPT | Performed by: EMERGENCY MEDICINE

## 2023-08-14 PROCEDURE — 94761 N-INVAS EAR/PLS OXIMETRY MLT: CPT

## 2023-08-14 PROCEDURE — 87186 SC STD MICRODIL/AGAR DIL: CPT | Performed by: EMERGENCY MEDICINE

## 2023-08-14 PROCEDURE — U0002 COVID-19 LAB TEST NON-CDC: HCPCS | Performed by: EMERGENCY MEDICINE

## 2023-08-14 PROCEDURE — 25000003 PHARM REV CODE 250: Performed by: EMERGENCY MEDICINE

## 2023-08-14 PROCEDURE — 87154 CUL TYP ID BLD PTHGN 6+ TRGT: CPT | Performed by: EMERGENCY MEDICINE

## 2023-08-14 PROCEDURE — 99285 EMERGENCY DEPT VISIT HI MDM: CPT | Mod: 25

## 2023-08-14 PROCEDURE — C9113 INJ PANTOPRAZOLE SODIUM, VIA: HCPCS | Performed by: FAMILY MEDICINE

## 2023-08-14 PROCEDURE — 80053 COMPREHEN METABOLIC PANEL: CPT | Performed by: EMERGENCY MEDICINE

## 2023-08-14 PROCEDURE — 11000001 HC ACUTE MED/SURG PRIVATE ROOM

## 2023-08-14 PROCEDURE — 25000003 PHARM REV CODE 250: Performed by: FAMILY MEDICINE

## 2023-08-14 PROCEDURE — 85730 THROMBOPLASTIN TIME PARTIAL: CPT | Performed by: EMERGENCY MEDICINE

## 2023-08-14 PROCEDURE — 82140 ASSAY OF AMMONIA: CPT | Performed by: EMERGENCY MEDICINE

## 2023-08-14 PROCEDURE — 87077 CULTURE AEROBIC IDENTIFY: CPT | Mod: 59 | Performed by: EMERGENCY MEDICINE

## 2023-08-14 PROCEDURE — 84443 ASSAY THYROID STIM HORMONE: CPT | Performed by: EMERGENCY MEDICINE

## 2023-08-14 PROCEDURE — 36415 COLL VENOUS BLD VENIPUNCTURE: CPT | Performed by: FAMILY MEDICINE

## 2023-08-14 RX ORDER — PANTOPRAZOLE SODIUM 40 MG/10ML
40 INJECTION, POWDER, LYOPHILIZED, FOR SOLUTION INTRAVENOUS 2 TIMES DAILY
Status: DISCONTINUED | OUTPATIENT
Start: 2023-08-14 | End: 2023-08-18 | Stop reason: HOSPADM

## 2023-08-14 RX ORDER — ACETAMINOPHEN 325 MG/1
650 TABLET ORAL EVERY 4 HOURS PRN
Status: DISCONTINUED | OUTPATIENT
Start: 2023-08-14 | End: 2023-08-18 | Stop reason: HOSPADM

## 2023-08-14 RX ORDER — LACTULOSE 10 G/15ML
30 SOLUTION ORAL
Status: COMPLETED | OUTPATIENT
Start: 2023-08-14 | End: 2023-08-14

## 2023-08-14 RX ORDER — SODIUM CHLORIDE 0.9 % (FLUSH) 0.9 %
10 SYRINGE (ML) INJECTION EVERY 8 HOURS
Status: DISCONTINUED | OUTPATIENT
Start: 2023-08-14 | End: 2023-08-18 | Stop reason: HOSPADM

## 2023-08-14 RX ORDER — IBUPROFEN 200 MG
16 TABLET ORAL
Status: DISCONTINUED | OUTPATIENT
Start: 2023-08-14 | End: 2023-08-18 | Stop reason: HOSPADM

## 2023-08-14 RX ORDER — AMOXICILLIN 250 MG
1 CAPSULE ORAL 2 TIMES DAILY
Status: DISCONTINUED | OUTPATIENT
Start: 2023-08-14 | End: 2023-08-18 | Stop reason: HOSPADM

## 2023-08-14 RX ORDER — SIMETHICONE 80 MG
1 TABLET,CHEWABLE ORAL 4 TIMES DAILY PRN
Status: DISCONTINUED | OUTPATIENT
Start: 2023-08-14 | End: 2023-08-18 | Stop reason: HOSPADM

## 2023-08-14 RX ORDER — POLYETHYLENE GLYCOL 3350 17 G/17G
17 POWDER, FOR SOLUTION ORAL DAILY
Status: DISCONTINUED | OUTPATIENT
Start: 2023-08-15 | End: 2023-08-18 | Stop reason: HOSPADM

## 2023-08-14 RX ORDER — LITHIUM CARBONATE 150 MG/1
300 CAPSULE ORAL NIGHTLY
Status: DISCONTINUED | OUTPATIENT
Start: 2023-08-14 | End: 2023-08-18 | Stop reason: HOSPADM

## 2023-08-14 RX ORDER — GLUCAGON 1 MG
1 KIT INJECTION
Status: DISCONTINUED | OUTPATIENT
Start: 2023-08-14 | End: 2023-08-18 | Stop reason: HOSPADM

## 2023-08-14 RX ORDER — LANOLIN ALCOHOL/MO/W.PET/CERES
1 CREAM (GRAM) TOPICAL DAILY
COMMUNITY
Start: 2023-04-18 | End: 2023-08-14

## 2023-08-14 RX ORDER — QUETIAPINE FUMARATE 100 MG/1
1 TABLET, FILM COATED ORAL NIGHTLY
COMMUNITY
Start: 2023-04-10 | End: 2023-08-14

## 2023-08-14 RX ORDER — FERROUS SULFATE 325(65) MG
325 TABLET ORAL DAILY
COMMUNITY

## 2023-08-14 RX ORDER — IBUPROFEN 200 MG
200 TABLET ORAL
Status: COMPLETED | OUTPATIENT
Start: 2023-08-14 | End: 2023-08-14

## 2023-08-14 RX ORDER — IBUPROFEN 400 MG/1
400 TABLET ORAL EVERY 6 HOURS PRN
Status: DISCONTINUED | OUTPATIENT
Start: 2023-08-14 | End: 2023-08-18 | Stop reason: HOSPADM

## 2023-08-14 RX ORDER — ONDANSETRON 8 MG/1
8 TABLET, ORALLY DISINTEGRATING ORAL EVERY 8 HOURS PRN
Status: DISCONTINUED | OUTPATIENT
Start: 2023-08-14 | End: 2023-08-18 | Stop reason: HOSPADM

## 2023-08-14 RX ORDER — NALOXONE HCL 0.4 MG/ML
0.02 VIAL (ML) INJECTION
Status: DISCONTINUED | OUTPATIENT
Start: 2023-08-14 | End: 2023-08-18 | Stop reason: HOSPADM

## 2023-08-14 RX ORDER — IBUPROFEN 200 MG
24 TABLET ORAL
Status: DISCONTINUED | OUTPATIENT
Start: 2023-08-14 | End: 2023-08-18 | Stop reason: HOSPADM

## 2023-08-14 RX ORDER — LACTULOSE 10 G/15ML
30 SOLUTION ORAL 3 TIMES DAILY
Status: DISCONTINUED | OUTPATIENT
Start: 2023-08-14 | End: 2023-08-18 | Stop reason: HOSPADM

## 2023-08-14 RX ORDER — LEVETIRACETAM 100 MG/ML
1000 SOLUTION ORAL 2 TIMES DAILY
Status: DISCONTINUED | OUTPATIENT
Start: 2023-08-14 | End: 2023-08-18 | Stop reason: HOSPADM

## 2023-08-14 RX ORDER — PROPRANOLOL HYDROCHLORIDE 20 MG/1
20 TABLET ORAL
COMMUNITY
Start: 2023-04-26 | End: 2023-08-14

## 2023-08-14 RX ORDER — MAG HYDROX/ALUMINUM HYD/SIMETH 200-200-20
30 SUSPENSION, ORAL (FINAL DOSE FORM) ORAL 4 TIMES DAILY PRN
Status: DISCONTINUED | OUTPATIENT
Start: 2023-08-14 | End: 2023-08-18 | Stop reason: HOSPADM

## 2023-08-14 RX ADMIN — CEFTRIAXONE SODIUM 1 G: 1 INJECTION, POWDER, FOR SOLUTION INTRAMUSCULAR; INTRAVENOUS at 06:08

## 2023-08-14 RX ADMIN — LITHIUM CARBONATE 300 MG: 150 CAPSULE, GELATIN COATED ORAL at 09:08

## 2023-08-14 RX ADMIN — LACTULOSE 30 G: 20 SOLUTION ORAL at 09:08

## 2023-08-14 RX ADMIN — RIFAXIMIN 550 MG: 550 TABLET ORAL at 09:08

## 2023-08-14 RX ADMIN — CEFTRIAXONE SODIUM 2 G: 2 INJECTION, POWDER, FOR SOLUTION INTRAMUSCULAR; INTRAVENOUS at 10:08

## 2023-08-14 RX ADMIN — IBUPROFEN 200 MG: 200 TABLET, FILM COATED ORAL at 09:08

## 2023-08-14 RX ADMIN — PANTOPRAZOLE SODIUM 40 MG: 40 INJECTION, POWDER, LYOPHILIZED, FOR SOLUTION INTRAVENOUS at 09:08

## 2023-08-14 RX ADMIN — Medication 10 ML: at 09:08

## 2023-08-14 RX ADMIN — LEVETIRACETAM 1000 MG: 500 SOLUTION ORAL at 09:08

## 2023-08-14 RX ADMIN — DOCUSATE SODIUM AND SENNOSIDES 1 TABLET: 8.6; 5 TABLET, FILM COATED ORAL at 09:08

## 2023-08-14 NOTE — ED NOTES
Assumed care of patient at this time. Patient incontinent of stool. Large bowel movement noted. Patient cleaned and new brief applied. Saldaña care completed. Rectal temp obtained and EDP made aware. Patient alert and confused. 3+ swelling noted to all extremities. Pt abdomen distended and round. Denies ever having paracentesis. Pt vitally stable. Updated on care plan. Safety intact. Call light in reach. Wctm.

## 2023-08-14 NOTE — HPI
Marely Hamilton is a 59 y/o F with a PMH of anemia alcohol abuse, bipolar disorder depression, fatigue, history of seizure, osteoarthritis, thrombocytopenia brought in by EMS from [facility] with complaints of confusion with patient home O2 not in place, there is associated abdominal pain, chills and then 101.4 fever when she arrived in the ED.   Na 132, K 3.5, Bun/Cr 8/0.5, , wbc 3.21, H/H 9.0/28, PLT 58, INR 1.9, Lactate 2.4, Ca 7.7, Alb 1.9, CXR Nonspecific bibasilar opacities could relate to atelectasis, aspiration or pneumonia

## 2023-08-14 NOTE — PHARMACY MED REC
"  [facility]           Pharmacy  Admission Medication History     The home medication history was taken by Jovita Rangel.      Medication history obtained from Medications listed below were obtained from: Nursing home    Based on information gathered for medication list, you may go to "Admission" then "Reconcile Home Medications" tabs to review and/or act upon those items.     The home medication list has been updated by the Pharmacy department.   Please read ALL comments highlighted in yellow.   Please address this information as you see fit.    Feel free to contact us if you have any questions or require assistance.    The medications listed below were removed from the home medication list.  Please reorder if appropriate:    Patient reports NOT TAKING the following medication(s):  Inderal 20mg  Thiamine 100mg  Seroquel 100mg      No current facility-administered medications on file prior to encounter.     Current Outpatient Medications on File Prior to Encounter   Medication Sig Dispense Refill    ferrous sulfate (FEOSOL) 325 mg (65 mg iron) Tab tablet Take 325 mg by mouth once daily.      lactulose (CHRONULAC) 20 gram/30 mL Soln Take 45 mLs (30 g total) by mouth 3 (three) times daily. 4050 mL 2    levetiracetam 500 mg/5 mL (5 mL) Soln Take 10 mLs (1,000 mg total) by mouth 2 (two) times daily. 600 mL 11    lithium (LITHOBID) 300 MG CR tablet Take 1 tablet (300 mg total) by mouth every evening. 30 tablet 11    OLANZapine (ZYPREXA) 5 MG tablet Take 1 tablet (5 mg total) by mouth every evening. 30 tablet 11    pantoprazole (PROTONIX) 40 mg suspension Take 1 packet (40 mg total) by mouth 2 (two) times daily. 60 packet 11    rifAXIMin (XIFAXAN) 550 mg Tab Take 1 tablet (550 mg total) by mouth 2 (two) times daily. 180 tablet 3       Please address this information as you see fit.  Feel free to contact us if you have any questions or require assistance.    Jovita" [place]  [phone]                .

## 2023-08-14 NOTE — CONSULTS
Paracentesis Consult Note  Interventional Radiology      Reason for Consult: paracentesis    SUBJECTIVE:     Chief Complaint:  SOB    Inpatient consult to Interventional Radiology  Consult performed by: Soraya Dorantes PA-C  Consult ordered by: Zoë Sanchez MD           History of Present Illness:  Marely Hamilton is a 58 y.o. 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  Interventional Radiology has been consulted for image guided diagnostic and therapeutic paracentesis.  Pt has not had paracentesis in the past. She is currently febrile. The pt is hemodynamically stable.     Review of Systems   Unable to perform ROS: Mental status change       Scheduled Meds:  Continuous Infusions:  PRN Meds:    Review of patient's allergies indicates:   Allergen Reactions    Sulfa (sulfonamide antibiotics) Rash    Codeine Nausea And Vomiting       Past Medical History:   Diagnosis Date    Addiction to drug     Alcohol abuse     Alcohol abuse, in remission 6/15/2015    14.5 weeks ago; AA weekly    Anemia     Anxiety 6/15/2015    Behavioral problem     Bipolar disorder     Bipolar disorder in remission 6/15/2015    Cirrhosis, Laennec's 6/15/2015    Depression     Encounter for blood transfusion     Epistaxis 6/15/2015    Fatigue     Glaucoma     Hematuria     Hepatic encephalopathy 6/15/2015    Hepatic enlargement     History of psychiatric care     History of psychiatric hospitalization     History of seizure 6/15/2015    1    hx of intentional Tylenol overdose 6/15/2015    2005- situational and hx of bipolar    Hx of psychiatric care     Macrocytic anemia 9/18/2015    6 units PRBC since June 2015    Jeana     Osteoarthritis     Other ascites 6/15/2015    Psychiatric exam requested by authority     Psychiatric problem      Psychosis 9/26/2019    Renal disorder     Seizures     Self-harming behavior     Suicide attempt     Therapy     Thrombocytopenia 6/15/2015     Past Surgical History:   Procedure Laterality Date    COSMETIC SURGERY      EMBOLIZATION N/A 6/11/2023    Procedure: EMBOLIZATION, BLOOD VESSEL;  Surgeon: Debbie Surgeon;  Location: [facility];  Service: Anesthesiology;  Laterality: N/A;    ESOPHAGOGASTRODUODENOSCOPY      ESOPHAGOGASTRODUODENOSCOPY N/A 7/6/2023    Procedure: EGD (ESOPHAGOGASTRODUODENOSCOPY);  Surgeon: Bernice Guillen MD;  Location: [facility] ([address] [place] [address]);  Service: Endoscopy;  Laterality: N/A;    ESOPHAGOGASTRODUODENOSCOPY N/A 7/11/2023    Procedure: EGD (ESOPHAGOGASTRODUODENOSCOPY);  Surgeon: Rangel Broussard MD;  Location: [facility] ([address] [place] [address]);  Service: Endoscopy;  Laterality: N/A;     Family History   Problem Relation Age of Onset    Alcohol abuse Father     Suicide Father     Bipolar disorder Father     Alcohol abuse Sister     Hypertension Mother     Alcohol abuse Paternal Grandfather     Drug abuse Neg Hx     Dementia Neg Hx      Social History     Tobacco Use    Smoking status: Never    Smokeless tobacco: Never   Substance Use Topics    Alcohol use: Not Currently     Comment: hx of ETOH abuse with cirrhosis of liver    Drug use: No       OBJECTIVE:     Vital Signs (Most Recent)  Temp: 100.1 °F (37.8 °C) (08/14/23 1043)  Pulse: 91 (08/14/23 1302)  Resp: 13 (08/14/23 1302)  BP: (!) 118/55 (08/14/23 1302)  SpO2: 100 % (08/14/23 1302)    Physical Exam:  Physical Exam  Vitals and nursing note reviewed.   Constitutional:       Appearance: Normal appearance. She is ill-appearing.   HENT:      Head: Normocephalic and atraumatic.      Right Ear: External ear normal.      Left Ear: External ear normal.      Nose: Nose normal.   Eyes:      General: Scleral icterus present.      Extraocular Movements: Extraocular movements intact.   Cardiovascular:      Rate and Rhythm: Tachycardia present.   Pulmonary:       Effort: Pulmonary effort is normal.      Comments: On 2L baseline  Abdominal:      General: Abdomen is flat. There is distension.   Musculoskeletal:      Cervical back: Normal range of motion and neck supple.      Right lower leg: Edema present.      Left lower leg: Edema present.   Skin:     General: Skin is warm and dry.   Neurological:      Mental Status: She is alert. She is disoriented.         Laboratory  I have reviewed all pertinent lab results within the past 24 hours.  CBC:   Recent Labs   Lab 08/14/23 0913   WBC 3.21*   RBC 2.65*   HGB 9.0*   HCT 28.0*   PLT 58*   *   MCH 34.0*   MCHC 32.1     CMP:   Recent Labs   Lab 08/14/23 0913   *   CALCIUM 7.7*   ALBUMIN 1.9*   PROT 5.6*   *   K 3.5   CO2 18*      BUN 8   CREATININE 0.5   ALKPHOS 119   ALT 12   AST 28   BILITOT 4.9*     Coagulation:   Recent Labs   Lab 08/14/23 0913   LABPROT 20.3*   INR 1.9*   APTT 38.4*       ASA/Mallampati  ASA: 3  Mallampati: n/a    Imaging:  Recent imaging studies including CT a/p on 06/04/2023 which was independently reviewed by Soraya Dorantes PA-C.     ASSESSMENT/PLAN:     Assessment:  58 y.o. 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 Pt has not had paracentesis in the past.     The procedure, rationale for and against, goals of care (diagnosis), expectations and risks (including but not limited to, pain, bleeding, infection, damage to nearby structures, need for additional procedures), potential benefits, and alternatives were discussed with the patient's sister Zaria.  All questions were answered to the best of my abilities.  The patient's sister wishes to speak to the primary team prior to proceeding with paracentesis.      Plan:  Pt refused  paracentesis and noted refused US in ED to assess for fluid.    Pt DOES NOT need to be NPO.  Anticoagulation history reviewed.  Coagulation labs reviewed.  Spoke with next of kin sister Zaria.  Zaria would like to speak with primary team before proceeding with paracentesis.  Notified primary team.  Will contact Zaria again tomorrow morning.    Thank you for the consult. Please contact with questions via [employer] secure chat.    Soraya Dorantes PA-C  Interventional Radiology

## 2023-08-14 NOTE — ASSESSMENT & PLAN NOTE
-UA positive  -Urine culture on 08/14/2023= pending   -Rocephin IV daily empirically x5 days.  Ordered on 08/14/2023

## 2023-08-14 NOTE — ED NOTES
Pt arrived via EMS from [facility] awake, alert and pleasantly confused.  Pt was found to have low oxygen this morning while not wearing her nasal canula. EMS reports sat was in the 80's. Pt on 2 liters nc with a sat of 100% while in room 10. Pt arrived with a baugh catheter in place draining clear yellow urine to a bedside drainage bag. Pt denies any complaints on arrival. Plan of care reviewed, call bell within reach.

## 2023-08-14 NOTE — Clinical Note
Diagnosis: Sepsis [959595]   Admitting Provider:: NIRMALA BERNAL [[id number]]   Future Attending Provider: NIRMALA BERNAL [[id number]]   Reason for IP Medical Treatment  (Clinical interventions that can only be accomplished in the IP setting? ) :: IV abx, monitor serial CBCs, Chem, blood cultures   I certify that Inpatient services for greater than or equal to 2 midnights are medically necessary:: Yes   Plans for Post-Acute care--if anticipated (pick the single best option):: D. Skilled Nursing Placement   Special Needs:: Fall Risk [15]

## 2023-08-14 NOTE — SUBJECTIVE & OBJECTIVE
Past Medical History:   Diagnosis Date    Addiction to drug     Alcohol abuse     Alcohol abuse, in remission 6/15/2015    14.5 weeks ago; AA weekly    Anemia     Anxiety 6/15/2015    Behavioral problem     Bipolar disorder     Bipolar disorder in remission 6/15/2015    Cirrhosis, Laennec's 6/15/2015    Depression     Encounter for blood transfusion     Epistaxis 6/15/2015    Fatigue     Glaucoma     Hematuria     Hepatic encephalopathy 6/15/2015    Hepatic enlargement     History of psychiatric care     History of psychiatric hospitalization     History of seizure 6/15/2015    1    hx of intentional Tylenol overdose 6/15/2015    2005- situational and hx of bipolar    Hx of psychiatric care     Macrocytic anemia 9/18/2015    6 units PRBC since June 2015    Jeana     Osteoarthritis     Other ascites 6/15/2015    Psychiatric exam requested by authority     Psychiatric problem     Psychosis 9/26/2019    Renal disorder     Seizures     Self-harming behavior     Suicide attempt     Therapy     Thrombocytopenia 6/15/2015       Past Surgical History:   Procedure Laterality Date    COSMETIC SURGERY      EMBOLIZATION N/A 6/11/2023    Procedure: EMBOLIZATION, BLOOD VESSEL;  Surgeon: Debbie Surgeon;  Location: [facility];  Service: Anesthesiology;  Laterality: N/A;    ESOPHAGOGASTRODUODENOSCOPY      ESOPHAGOGASTRODUODENOSCOPY N/A 7/6/2023    Procedure: EGD (ESOPHAGOGASTRODUODENOSCOPY);  Surgeon: Bernice Guillen MD;  Location: [address]);  Service: Endoscopy;  Laterality: N/A;    ESOPHAGOGASTRODUODENOSCOPY N/A 7/11/2023    Procedure: EGD (ESOPHAGOGASTRODUODENOSCOPY);  Surgeon: Rangel Broussard MD;  Location: [address]);  Service: Endoscopy;  Laterality: N/A;       Review of patient's allergies indicates:   Allergen Reactions    Sulfa (sulfonamide antibiotics) Rash    Codeine Nausea And Vomiting       No current facility-administered medications on file prior to encounter.     Current Outpatient Medications on  File Prior to Encounter   Medication Sig    ferrous sulfate (FEOSOL) 325 mg (65 mg iron) Tab tablet Take 325 mg by mouth once daily.    lactulose (CHRONULAC) 20 gram/30 mL Soln Take 45 mLs (30 g total) by mouth 3 (three) times daily.    levetiracetam 500 mg/5 mL (5 mL) Soln Take 10 mLs (1,000 mg total) by mouth 2 (two) times daily.    lithium (LITHOBID) 300 MG CR tablet Take 1 tablet (300 mg total) by mouth every evening.    OLANZapine (ZYPREXA) 5 MG tablet Take 1 tablet (5 mg total) by mouth every evening.    pantoprazole (PROTONIX) 40 mg suspension Take 1 packet (40 mg total) by mouth 2 (two) times daily.    rifAXIMin (XIFAXAN) 550 mg Tab Take 1 tablet (550 mg total) by mouth 2 (two) times daily.    [DISCONTINUED] propranoloL (INDERAL) 20 MG tablet Take 20 mg by mouth.    [DISCONTINUED] QUEtiapine (SEROQUEL) 100 MG Tab Take 1 tablet by mouth every evening.    [DISCONTINUED] thiamine 100 MG tablet Take 1 tablet by mouth once daily.     Family History       Problem Relation (Age of Onset)    Alcohol abuse Father, Sister, Paternal Grandfather    Bipolar disorder Father    Hypertension Mother    Suicide Father          Tobacco Use    Smoking status: Never    Smokeless tobacco: Never   Substance and Sexual Activity    Alcohol use: Not Currently     Comment: hx of ETOH abuse with cirrhosis of liver    Drug use: No    Sexual activity: Not Currently     Review of Systems   Unable to perform ROS: Acuity of condition     Objective:     Vital Signs (Most Recent):  Temp: 98.8 °F (37.1 °C) (08/14/23 1702)  Pulse: 87 (08/14/23 1702)  Resp: 14 (08/14/23 1702)  BP: (!) 114/53 (08/14/23 1702)  SpO2: 100 % (08/14/23 1702) Vital Signs (24h Range):  Temp:  [98.8 °F (37.1 °C)-101.4 °F (38.6 °C)] 98.8 °F (37.1 °C)  Pulse:  [] 87  Resp:  [12-18] 14  SpO2:  [97 %-100 %] 100 %  BP: (107-148)/(53-68) 114/53     Weight: 52.2 kg (115 lb)  Body mass index is 21.03 kg/m².     Physical Exam  Constitutional:       General: She is in acute  "distress.      Appearance: She is ill-appearing.   HENT:      Head: Normocephalic and atraumatic.      Nose: Nose normal.   Eyes:      General: Scleral icterus present.      Extraocular Movements: Extraocular movements intact.      Pupils: Pupils are equal, round, and reactive to light.   Cardiovascular:      Rate and Rhythm: Normal rate.      Pulses: Normal pulses.   Pulmonary:      Breath sounds: No rales.   Abdominal:      General: Bowel sounds are normal. There is distension.      Palpations: Abdomen is soft.      Tenderness: There is abdominal tenderness. There is guarding.   Musculoskeletal:         General: Normal range of motion.      Cervical back: Normal range of motion.      Right lower leg: Edema present.      Left lower leg: Edema present.   Skin:     General: Skin is warm.      Capillary Refill: Capillary refill takes less than 2 seconds.   Neurological:      Mental Status: She is alert. She is disoriented.   Psychiatric:         Mood and Affect: Mood normal.         Behavior: Behavior normal.              CRANIAL NERVES     CN III, IV, VI   Pupils are equal, round, and reactive to light.       Significant Labs: A1C:   Recent Labs   Lab 05/29/23  0656   HGBA1C <4.0*     ABGs: No results for input(s): "PH", "PCO2", "HCO3", "POCSATURATED", "BE", "TOTALHB", "COHB", "METHB", "O2HB", "POCFIO2", "PO2" in the last 48 hours.  Blood Culture: No results for input(s): "LABBLOO" in the last 48 hours.  CBC:   Recent Labs   Lab 08/14/23  0913   WBC 3.21*   HGB 9.0*   HCT 28.0*   PLT 58*     CMP:   Recent Labs   Lab 08/14/23  0913   *   K 3.5      CO2 18*   *   BUN 8   CREATININE 0.5   CALCIUM 7.7*   PROT 5.6*   ALBUMIN 1.9*   BILITOT 4.9*   ALKPHOS 119   AST 28   ALT 12   ANIONGAP 6*     Lactic Acid:   Recent Labs   Lab 08/14/23  0913   LACTATE 2.4*     Lipase: No results for input(s): "LIPASE" in the last 48 hours.  Lipid Panel: No results for input(s): "CHOL", "HDL", "LDLCALC", "TRIG", "CHOLHDL" " "in the last 48 hours.  Magnesium:   Recent Labs   Lab 08/14/23  0913   MG 1.8     Prealbumin: No results for input(s): "PREALBUMIN" in the last 48 hours.  Troponin: No results for input(s): "TROPONINI", "TROPONINIHS" in the last 48 hours.  TSH:   Recent Labs   Lab 08/14/23  0913   TSH 0.942     Urine Culture: No results for input(s): "LABURIN" in the last 48 hours.  Urine Studies:   Recent Labs   Lab 08/14/23  0846   COLORU Yellow   APPEARANCEUA Cloudy*   PHUR 7.0   SPECGRAV 1.010   PROTEINUA 1+*   GLUCUA Negative   KETONESU Negative   BILIRUBINUA Negative   OCCULTUA 3+*   NITRITE Positive*   UROBILINOGEN Negative   LEUKOCYTESUR 3+*   RBCUA 9*   WBCUA 87*   BACTERIA Many*   HYALINECASTS 0       Significant Imaging: I have reviewed all pertinent imaging results/findings within the past 24 hours.  "

## 2023-08-14 NOTE — ED NOTES
Contacted lab for second set of blood cultures. IR contacted in regards to paracentesis. Pt updated on care plan. Medicated per MAR. Provided with cool rags and ice pack per EDP VU. Pt repositioned for comfort. Safety intact. Wctm.

## 2023-08-14 NOTE — ED NOTES
Pt refused ultrasound. This nurse educated pt as to why ultrasound was important, but pt still refused to have it.

## 2023-08-14 NOTE — ASSESSMENT & PLAN NOTE
Bipolar disorder, manic  On Zyprexa and Olanzapine- on hold due to confusion on admit  Continue Lithium

## 2023-08-14 NOTE — ASSESSMENT & PLAN NOTE
Abdominal pain  Lactate 2.4  Blood cx on 08/14/2023= g stain with Gram-negative rods.  Await final results and sensitivities  CXT

## 2023-08-14 NOTE — H&P
[facility] Medicine  History & Physical    Patient Name: Marely Hamilton  MRN: [id number]  Patient Class: IP- Inpatient  Admission Date: 8/14/2023  Attending Physician: Zoë Sanchez MD  Primary Care Provider: Viktor Ross MD         Patient information was obtained from ER records.     Subjective:     Principal Problem:SBP (spontaneous bacterial peritonitis)    Chief Complaint:   Chief Complaint   Patient presents with    Shortness of Breath     Pt arrived to the ED via [facility] from [facility]. Nursing home staff reports the pt was found with resp distress around 0600 this morning. Pt typically wears 2L of O2 and was found without O2 in place. Baseline confusion but appears more confused than normal per nursing home staff.        HPI: Marely Hamilton is a 57 y/o F with a PMH of anemia alcohol abuse, bipolar disorder depression, fatigue, history of seizure, osteoarthritis, thrombocytopenia brought in by EMS from [facility] with complaints of confusion with patient home O2 not in place, there is associated abdominal pain, chills and then 101.4 fever when she arrived in the ED.   Na 132, K 3.5, Bun/Cr 8/0.5, , wbc 3.21, H/H 9.0/28, PLT 58, INR 1.9, Lactate 2.4, Ca 7.7, Alb 1.9, CXR Nonspecific bibasilar opacities could relate to atelectasis, aspiration or pneumonia      Past Medical History:   Diagnosis Date    Addiction to drug     Alcohol abuse     Alcohol abuse, in remission 6/15/2015    14.5 weeks ago; AA weekly    Anemia     Anxiety 6/15/2015    Behavioral problem     Bipolar disorder     Bipolar disorder in remission 6/15/2015    Cirrhosis, Laennec's 6/15/2015    Depression     Encounter for blood transfusion     Epistaxis 6/15/2015    Fatigue     Glaucoma     Hematuria     Hepatic encephalopathy 6/15/2015    Hepatic enlargement     History of psychiatric care     History of psychiatric hospitalization     History of seizure 6/15/2015    1    hx of intentional  Tylenol overdose 6/15/2015    2005- situational and hx of bipolar    Hx of psychiatric care     Macrocytic anemia 9/18/2015    6 units PRBC since June 2015    Jeana     Osteoarthritis     Other ascites 6/15/2015    Psychiatric exam requested by authority     Psychiatric problem     Psychosis 9/26/2019    Renal disorder     Seizures     Self-harming behavior     Suicide attempt     Therapy     Thrombocytopenia 6/15/2015       Past Surgical History:   Procedure Laterality Date    COSMETIC SURGERY      EMBOLIZATION N/A 6/11/2023    Procedure: EMBOLIZATION, BLOOD VESSEL;  Surgeon: Debbie Surgeon;  Location: [facility];  Service: Anesthesiology;  Laterality: N/A;    ESOPHAGOGASTRODUODENOSCOPY      ESOPHAGOGASTRODUODENOSCOPY N/A 7/6/2023    Procedure: EGD (ESOPHAGOGASTRODUODENOSCOPY);  Surgeon: Bernice Guillen MD;  Location: [facility] (2ND FLR);  Service: Endoscopy;  Laterality: N/A;    ESOPHAGOGASTRODUODENOSCOPY N/A 7/11/2023    Procedure: EGD (ESOPHAGOGASTRODUODENOSCOPY);  Surgeon: Rangel Broussard MD;  Location: [facility] ([facility] FLR);  Service: Endoscopy;  Laterality: N/A;       Review of patient's allergies indicates:   Allergen Reactions    Sulfa (sulfonamide antibiotics) Rash    Codeine Nausea And Vomiting       No current facility-administered medications on file prior to encounter.     Current Outpatient Medications on File Prior to Encounter   Medication Sig    ferrous sulfate (FEOSOL) 325 mg (65 mg iron) Tab tablet Take 325 mg by mouth once daily.    lactulose (CHRONULAC) 20 gram/30 mL Soln Take 45 mLs (30 g total) by mouth 3 (three) times daily.    levetiracetam 500 mg/5 mL (5 mL) Soln Take 10 mLs (1,000 mg total) by mouth 2 (two) times daily.    lithium (LITHOBID) 300 MG CR tablet Take 1 tablet (300 mg total) by mouth every evening.    OLANZapine (ZYPREXA) 5 MG tablet Take 1 tablet (5 mg total) by mouth every evening.    pantoprazole (PROTONIX) 40 mg suspension Take 1 packet (40 mg total) by mouth 2 (two) times  daily.    rifAXIMin (XIFAXAN) 550 mg Tab Take 1 tablet (550 mg total) by mouth 2 (two) times daily.    [DISCONTINUED] propranoloL (INDERAL) 20 MG tablet Take 20 mg by mouth.    [DISCONTINUED] QUEtiapine (SEROQUEL) 100 MG Tab Take 1 tablet by mouth every evening.    [DISCONTINUED] thiamine 100 MG tablet Take 1 tablet by mouth once daily.     Family History       Problem Relation (Age of Onset)    Alcohol abuse Father, Sister, Paternal Grandfather    Bipolar disorder Father    Hypertension Mother    Suicide Father          Tobacco Use    Smoking status: Never    Smokeless tobacco: Never   Substance and Sexual Activity    Alcohol use: Not Currently     Comment: hx of ETOH abuse with cirrhosis of liver    Drug use: No    Sexual activity: Not Currently     Review of Systems   Unable to perform ROS: Acuity of condition     Objective:     Vital Signs (Most Recent):  Temp: 98.8 °F (37.1 °C) (08/14/23 1702)  Pulse: 87 (08/14/23 1702)  Resp: 14 (08/14/23 1702)  BP: (!) 114/53 (08/14/23 1702)  SpO2: 100 % (08/14/23 1702) Vital Signs (24h Range):  Temp:  [98.8 °F (37.1 °C)-101.4 °F (38.6 °C)] 98.8 °F (37.1 °C)  Pulse:  [] 87  Resp:  [12-18] 14  SpO2:  [97 %-100 %] 100 %  BP: (107-148)/(53-68) 114/53     Weight: 52.2 kg (115 lb)  Body mass index is 21.03 kg/m².     Physical Exam  Constitutional:       General: She is in acute distress.      Appearance: She is ill-appearing.   HENT:      Head: Normocephalic and atraumatic.      Nose: Nose normal.   Eyes:      General: Scleral icterus present.      Extraocular Movements: Extraocular movements intact.      Pupils: Pupils are equal, round, and reactive to light.   Cardiovascular:      Rate and Rhythm: Normal rate.      Pulses: Normal pulses.   Pulmonary:      Breath sounds: No rales.   Abdominal:      General: Bowel sounds are normal. There is distension.      Palpations: Abdomen is soft.      Tenderness: There is abdominal tenderness. There is guarding.   Musculoskeletal:     "     General: Normal range of motion.      Cervical back: Normal range of motion.      Right lower leg: Edema present.      Left lower leg: Edema present.   Skin:     General: Skin is warm.      Capillary Refill: Capillary refill takes less than 2 seconds.   Neurological:      Mental Status: She is alert. She is disoriented.   Psychiatric:         Mood and Affect: Mood normal.         Behavior: Behavior normal.              CRANIAL NERVES     CN III, IV, VI   Pupils are equal, round, and reactive to light.       Significant Labs: A1C:   Recent Labs   Lab 05/29/23  0656   HGBA1C <4.0*     ABGs: No results for input(s): "PH", "PCO2", "HCO3", "POCSATURATED", "BE", "TOTALHB", "COHB", "METHB", "O2HB", "POCFIO2", "PO2" in the last 48 hours.  Blood Culture: No results for input(s): "LABBLOO" in the last 48 hours.  CBC:   Recent Labs   Lab 08/14/23  0913   WBC 3.21*   HGB 9.0*   HCT 28.0*   PLT 58*     CMP:   Recent Labs   Lab 08/14/23  0913   *   K 3.5      CO2 18*   *   BUN 8   CREATININE 0.5   CALCIUM 7.7*   PROT 5.6*   ALBUMIN 1.9*   BILITOT 4.9*   ALKPHOS 119   AST 28   ALT 12   ANIONGAP 6*     Lactic Acid:   Recent Labs   Lab 08/14/23  0913   LACTATE 2.4*     Lipase: No results for input(s): "LIPASE" in the last 48 hours.  Lipid Panel: No results for input(s): "CHOL", "HDL", "LDLCALC", "TRIG", "CHOLHDL" in the last 48 hours.  Magnesium:   Recent Labs   Lab 08/14/23  0913   MG 1.8     Prealbumin: No results for input(s): "PREALBUMIN" in the last 48 hours.  Troponin: No results for input(s): "TROPONINI", "TROPONINIHS" in the last 48 hours.  TSH:   Recent Labs   Lab 08/14/23  0913   TSH 0.942     Urine Culture: No results for input(s): "LABURIN" in the last 48 hours.  Urine Studies:   Recent Labs   Lab 08/14/23  0846   COLORU Yellow   APPEARANCEUA Cloudy*   PHUR 7.0   SPECGRAV 1.010   PROTEINUA 1+*   GLUCUA Negative   KETONESU Negative   BILIRUBINUA Negative   OCCULTUA 3+*   NITRITE Positive* "   UROBILINOGEN Negative   LEUKOCYTESUR 3+*   RBCUA 9*   WBCUA 87*   BACTERIA Many*   HYALINECASTS 0       Significant Imaging: I have reviewed all pertinent imaging results/findings within the past 24 hours.    Assessment/Plan:     * SBP (spontaneous bacterial peritonitis)  Abdominal pain  Lactate 2.4  Blood cx  CXT      AMS (altered mental status)  NH3 - 55  Continue Lactulose      Pneumonia  CXR Nonspecific bibasilar opacities could relate to atelectasis, aspiration or pneumonia  CXT      Gastrointestinal hemorrhage associated with duodenal ulcer  Continue PPI      UTI (urinary tract infection)  UA positive  Ceftriaxone pending urine cx       Bipolar 1 disorder, depressed, severe  Bipolar disorder, manic  On Zyprexa and Olanzapine- on hold due to confusion on admit  Continue Lithium      History of seizure  Keppra     Ascites  consult IR for diagnostic paracentesis  Ascitic ample analysis pending  CXT    Thrombocytopenia  Due to chronic liver disease      Cirrhosis, Laennec's  Alcohol cirrhosis  Continue Lactulose  Rifaximin   Monitor electrolyte        VTE Risk Mitigation (From admission, onward)           Ordered     IP VTE HIGH RISK PATIENT  Once         08/14/23 1827     Place sequential compression device  Until discontinued         08/14/23 1827                               Zoë Sanchez MD  Department of Hospital Medicine  [place] - Telemetry

## 2023-08-14 NOTE — ED PROVIDER NOTES
"Encounter Date: 8/14/2023       History     Chief Complaint   Patient presents with    Shortness of Breath     Pt arrived to the ED via [facility] from [facility]. Nursing home staff reports the pt was found with resp distress around 0600 this morning. Pt typically wears 2L of O2 and was found without O2 in place. Baseline confusion but appears more confused than normal per nursing home staff.     HPI  This is a 58 y.o. female who has a past medical history of Addiction to drug, Alcohol abuse, Alcohol abuse, in remission (6/15/2015), Anemia, Anxiety (6/15/2015), Behavioral problem, Bipolar disorder, Bipolar disorder in remission (6/15/2015), Cirrhosis, Laennec's (6/15/2015), Depression, Encounter for blood transfusion, Epistaxis (6/15/2015), Fatigue, Glaucoma, Hematuria, Hepatic encephalopathy (6/15/2015), Hepatic enlargement, History of psychiatric care, History of psychiatric hospitalization, History of seizure (6/15/2015), hx of intentional Tylenol overdose (6/15/2015), psychiatric care, Macrocytic anemia (9/18/2015), Jeana, Osteoarthritis, Other ascites (6/15/2015), Psychiatric exam requested by authority, Psychiatric problem, Psychosis (9/26/2019), Renal disorder, Seizures, Self-harming behavior, Suicide attempt, Therapy, and Thrombocytopenia (6/15/2015).     The patient presents to the Emergency Department with respiratory distress via EMS.  Patient sent from [facility], patient found without oxygen in place, normally wearing 2 L of O2.  They also report patient is more confused than normal per nursing home staff.     When patient asked what is going on, she stated I got all these wires on me that is the problem.  The she mentioned, "Do you see this barcode on my arm? That's my boyfriend's Social Security number and he's a neurosurgeon." Then when asked what I would do wih that information, she states, "You could get all of these wires off of me!"    She endorses abdominal pain, chills but " denies any shortness of breath, chest discomfort, headache or dizziness, fever.  States that stools are loose due to lactulose.        Review of patient's allergies indicates:   Allergen Reactions    Sulfa (sulfonamide antibiotics) Rash    Codeine Nausea And Vomiting     Past Medical History:   Diagnosis Date    Addiction to drug     Alcohol abuse     Alcohol abuse, in remission 6/15/2015    14.5 weeks ago; AA weekly    Anemia     Anxiety 6/15/2015    Behavioral problem     Bipolar disorder     Bipolar disorder in remission 6/15/2015    Cirrhosis, Laennec's 6/15/2015    Depression     Encounter for blood transfusion     Epistaxis 6/15/2015    Fatigue     Glaucoma     Hematuria     Hepatic encephalopathy 6/15/2015    Hepatic enlargement     History of psychiatric care     History of psychiatric hospitalization     History of seizure 6/15/2015    1    hx of intentional Tylenol overdose 6/15/2015    2005- situational and hx of bipolar    Hx of psychiatric care     Macrocytic anemia 9/18/2015    6 units PRBC since June 2015    Jeana     Osteoarthritis     Other ascites 6/15/2015    Psychiatric exam requested by authority     Psychiatric problem     Psychosis 9/26/2019    Renal disorder     Seizures     Self-harming behavior     Suicide attempt     Therapy     Thrombocytopenia 6/15/2015     Past Surgical History:   Procedure Laterality Date    COSMETIC SURGERY      EMBOLIZATION N/A 6/11/2023    Procedure: EMBOLIZATION, BLOOD VESSEL;  Surgeon: Debbie Surgeon;  Location: [facility];  Service: Anesthesiology;  Laterality: N/A;    ESOPHAGOGASTRODUODENOSCOPY      ESOPHAGOGASTRODUODENOSCOPY N/A 7/6/2023    Procedure: EGD (ESOPHAGOGASTRODUODENOSCOPY);  Surgeon: Bernice Guillen MD;  Location: [address]);  Service: Endoscopy;  Laterality: N/A;    ESOPHAGOGASTRODUODENOSCOPY N/A 7/11/2023    Procedure: EGD (ESOPHAGOGASTRODUODENOSCOPY);  Surgeon: Rangel Broussard MD;  Location: [address]);  Service: Endoscopy;   Laterality: N/A;     Family History   Problem Relation Age of Onset    Alcohol abuse Father     Suicide Father     Bipolar disorder Father     Alcohol abuse Sister     Hypertension Mother     Alcohol abuse Paternal Grandfather     Drug abuse Neg Hx     Dementia Neg Hx      Social History     Tobacco Use    Smoking status: Never    Smokeless tobacco: Never   Substance Use Topics    Alcohol use: Not Currently     Comment: hx of ETOH abuse with cirrhosis of liver    Drug use: No     Review of Systems   Unable to perform ROS: Mental status change       Physical Exam     Initial Vitals [08/14/23 0731]   BP Pulse Resp Temp SpO2   133/68 93 12 99.5 °F (37.5 °C) 98 %      MAP       --         Physical Exam    Nursing note and vitals reviewed.  Constitutional: She appears well-developed and well-nourished. She is not diaphoretic. She appears distressed.   HENT:   Head: Normocephalic and atraumatic.   Dry oropharynx   Eyes: Scleral icterus is present.   +pallor   Cardiovascular:  Normal rate, regular rhythm and intact distal pulses.           Pulmonary/Chest: No respiratory distress.   Abdominal: She exhibits distension. There is abdominal tenderness (generalized).   +ascites There is guarding.   Musculoskeletal:         General: Edema (3+) present.     Neurological: She is alert. She has normal strength. GCS score is 15. GCS eye subscore is 4. GCS verbal subscore is 5. GCS motor subscore is 6.   Oriented x2   Skin: Skin is warm and dry. Capillary refill takes less than 2 seconds. No rash noted. No erythema.   Psychiatric: She has a normal mood and affect.         ED Course   Procedures    Medical Decision Making  This is an emergent evaluation of a 58 y.o.female patient with presentation of delirium, abdominal swelling, jaundice, history of chronic cirrhosis.  Patient also reportedly short winded however was without her home oxygen.  Patient also hot to touch.      Initial differentials include but are not limited to:  sepsis, SBP, UTI, pneumonia, acidosis, dehydration, electrolyte abnormality.     Plan:  Septic workup including lactate, blood cultures, IV antibiotics, CBC, CMP, COVID, flu chest x-ray.  Will consult IR for diagnostic paracentesis      Problems Addressed:  Confusion: complicated acute illness or injury  Pulmonary edema: chronic illness or injury with exacerbation, progression, or side effects of treatment  Sepsis: complicated acute illness or injury with systemic symptoms that poses a threat to life or bodily functions    Amount and/or Complexity of Data Reviewed  Labs: ordered. Decision-making details documented in ED Course.  Radiology: ordered and independent interpretation performed.     Details: See below  ECG/medicine tests: ordered and independent interpretation performed. Decision-making details documented in ED Course.  Discussion of management or test interpretation with external provider(s): Case discussed with Dr. Morales, hospitalist, will admit to her service.    Risk  OTC drugs.  Prescription drug management.  Decision regarding hospitalization.  Risk Details: Patient requires admission for sepsis, likely SBP based upon abdominal fullness, tenderness with cirrhosis.  No evidence of pneumonia at this time or UTI.  Patient may have intra-abdominal infection otherwise specified, however will have hospitalist follow paracentesis labs.     Patient also managed with lactulose here for possible hepatic encephalopathy.        This patient does have evidence of infective focus  My overall impression is sepsis.  Source: Abdominal  Antibiotics given-   Antibiotics (72h ago, onward)      None          Latest lactate reviewed-  Recent Labs   Lab 08/14/23  0913   LACTATE 2.4*     Organ dysfunction indicated by Encephalopathy    Fluid challenge Not needed - patient is not hypotensive      Post- resuscitation assessment No - Post resuscitation assessment not needed       Will Not start Pressors- Levophed for MAP of  65  Source control achieved by: n/a, abx only.      Labs Reviewed   CBC W/ AUTO DIFFERENTIAL - Abnormal; Notable for the following components:       Result Value    WBC 3.21 (*)     RBC 2.65 (*)     Hemoglobin 9.0 (*)     Hematocrit 28.0 (*)      (*)     MCH 34.0 (*)     RDW 18.2 (*)     Platelets 58 (*)     Immature Granulocytes 0.9 (*)     Lymph # 0.3 (*)     Mono # 0.2 (*)     Gran % 85.3 (*)     Lymph % 7.2 (*)     Platelet Estimate Decreased (*)     All other components within normal limits   COMPREHENSIVE METABOLIC PANEL - Abnormal; Notable for the following components:    Sodium 132 (*)     CO2 18 (*)     Glucose 112 (*)     Calcium 7.7 (*)     Total Protein 5.6 (*)     Albumin 1.9 (*)     Total Bilirubin 4.9 (*)     Anion Gap 6 (*)     All other components within normal limits   LACTIC ACID, PLASMA - Abnormal; Notable for the following components:    Lactate (Lactic Acid) 2.4 (*)     All other components within normal limits   URINALYSIS, REFLEX TO URINE CULTURE - Abnormal; Notable for the following components:    Appearance, UA Cloudy (*)     Protein, UA 1+ (*)     Occult Blood UA 3+ (*)     Nitrite, UA Positive (*)     Leukocytes, UA 3+ (*)     All other components within normal limits    Narrative:     Specimen Source->Urine   AMMONIA - Abnormal; Notable for the following components:    Ammonia 55 (*)     All other components within normal limits   PROTIME-INR - Abnormal; Notable for the following components:    Prothrombin Time 20.3 (*)     INR 1.9 (*)     All other components within normal limits   APTT - Abnormal; Notable for the following components:    aPTT 38.4 (*)     All other components within normal limits   C-REACTIVE PROTEIN - Abnormal; Notable for the following components:    CRP 50.6 (*)     All other components within normal limits   PROCALCITONIN - Abnormal; Notable for the following components:    Procalcitonin 0.64 (*)     All other components within normal limits   URINALYSIS  MICROSCOPIC - Abnormal; Notable for the following components:    RBC, UA 9 (*)     WBC, UA 87 (*)     Bacteria Many (*)     Yeast, UA Rare (*)     All other components within normal limits    Narrative:     Specimen Source->Urine   INFLUENZA A & B BY MOLECULAR   CULTURE, BLOOD   CULTURE, BLOOD   CULTURE, BODY FLUID - BACTEC   GRAM STAIN   CULTURE, URINE   TSH   SARS-COV-2 RNA AMPLIFICATION, QUAL   MAGNESIUM   WBC & DIFF, BODY FLUID   FREEZE AND HOLD -    ALBUMIN, PERITONEAL, PLEURAL FLUID OR FRED DRAINAGE, IN-HOUSE   AMYLASE, PERITONEAL, PLEURAL FLUID OR FRED DRAINAGE, IN-HOUSE   LACTIC ACID, PLASMA          Imaging Results              X-Ray Chest AP Portable (Final result)  Result time 08/14/23 09:54:22      Final result by Mumtaz Penny MD (08/14/23 09:54:22)                   Impression:      Nonspecific bibasilar opacities could relate to atelectasis, aspiration or pneumonia.    Interstitial coarsening appears mostly chronic, noting that a superimposed degree of acute pulmonary vascular congestion/interstitial edema is not excluded.    Question small right pleural effusion.      Electronically signed by: Mumtaz Penny  Date:    08/14/2023  Time:    09:54               Narrative:    EXAMINATION:  XR CHEST AP PORTABLE    CLINICAL HISTORY:  Chronic pulmonary edema    TECHNIQUE:  Single frontal view of the chest was performed.    COMPARISON:  Chest radiograph performed 07/25/2023, 12:03 hours    FINDINGS:  Monitoring leads over the chest.  Grossly unchanged cardiomediastinal contours.  Nonspecific basilar and retrocardiac opacities.  Interstitial coarsening, as seen previously on 07/25/2023 study.    Question small layering right pleural effusion.  No evident pneumothorax.    No acute findings the visualized abdomen.  Osseous and soft tissue structures appear without definite acute change.                                    X-Rays:   Independently Interpreted Readings:   Chest X-Ray: Increased vascular  markings consistent with CHF are present.  Cardiomegaly present.     Medications   lactulose 20 gram/30 mL solution Soln 30 g (30 g Oral Given 8/14/23 0950)   ibuprofen tablet 200 mg (200 mg Oral Given 8/14/23 0949)   cefTRIAXone (ROCEPHIN) 2 g in dextrose 5 % in water (D5W) 100 mL IVPB (MB+) (0 g Intravenous Stopped 8/14/23 1042)                 ED Course as of 08/14/23 1513   Mon Aug 14, 2023   0847 EKG: NSR at 87 bpm, nl axis, nl intervals, no hypertrophy, no ST-T changes as read by me (Dr. Bean).  [NP]   1006 Procalcitonin(!): 0.64 [NP]   1006 TSH: 0.942 [NP]   1006 aPTT(!): 38.4 [NP]   1006 INR(!): 1.9 [NP]   1006 CRP(!): 50.6 [NP]   1006 WBC(!): 3.21 [NP]   1006 Hemoglobin(!): 9.0 [NP]   1006 Hematocrit(!): 28.0 [NP]   1006 Platelets(!): 58 [NP]   1006 Magnesium: 1.8 [NP]   1006 Sodium(!): 132 [NP]   1006 Potassium: 3.5 [NP]   1006 Chloride: 108 [NP]   1006 CO2(!): 18 [NP]   1006 Glucose(!): 112 [NP]   1006 BUN: 8 [NP]   1006 Creatinine: 0.5 [NP]   1006 Calcium(!): 7.7 [NP]   1006 Albumin(!): 1.9 [NP]   1006 BILIRUBIN TOTAL(!): 4.9 [NP]   1006 Lactate, Jamie(!): 2.4 [NP]   1006 Ammonia(!): 55 [NP]   1006 Influenza A, Molecular: Negative [NP]   1006 Influenza B, Molecular: Negative [NP]   1006 SARS-CoV-2 RNA, Amplification, Qual: Negative [NP]      ED Course User Index  [NP] Mikey Bean MD          Attending Critical Care:   Critical Care Times:   ==============================================================  · Total Critical Care Time - exclusive of procedural time: 35 minutes.  ==============================================================  Critical Care Comments: Critical Care time was inclusive of direct patient care, review of previous records, interpretation of labs, imaging and ekg, as well as discussion of my impression and plan of care with the patient, family and other clinicians/consultants. This time is exclusive of any separate billable procedures and of treating other patients. Critical care  was required for this patient who presented with sepsis, possible SBP, encephalopathic, requiring my emergent evaluation and management in the emergency department.            Clinical Impression:   Final diagnoses:  [R41.0] Confusion  [J81.1] Pulmonary edema  [A41.9] Sepsis  [G93.40] Acute encephalopathy  [K74.60, K76.9] Liver disease, chronic, with cirrhosis  [K65.2] SBP (spontaneous bacterial peritonitis) (Primary)  [D69.6] Thrombocytopenia  [K70.30] Cirrhosis, Laennec's  [E72.20] Hyperammonemia  [D68.9] Coagulopathy  [K76.82] Hepatic encephalopathy  [D64.9] Anemia, unspecified type  [D72.819] Leukopenia, unspecified type  [E88.09] Hypoalbuminemia        ED Disposition Condition    Admit Stable                Mikey Bean MD  08/14/23 8909

## 2023-08-14 NOTE — ED NOTES
Report received from NEHAL Her . Pt AOX4 and appears to be resting comfortably. No distress noted.

## 2023-08-15 PROBLEM — R60.9 EDEMA: Status: ACTIVE | Noted: 2023-08-15

## 2023-08-15 PROBLEM — F31.9 BIPOLAR 1 DISORDER: Status: RESOLVED | Noted: 2020-06-09 | Resolved: 2023-08-15

## 2023-08-15 LAB
ACANTHOCYTES BLD QL SMEAR: PRESENT
ACINETOBACTER CALCOACETICUS/BAUMANNII COMPLEX: NOT DETECTED
ALBUMIN SERPL BCP-MCNC: 1.7 G/DL (ref 3.5–5.2)
ALP SERPL-CCNC: 87 U/L (ref 55–135)
ALT SERPL W/O P-5'-P-CCNC: 13 U/L (ref 10–44)
ANION GAP SERPL CALC-SCNC: 10 MMOL/L (ref 8–16)
ANISOCYTOSIS BLD QL SMEAR: ABNORMAL
AST SERPL-CCNC: 25 U/L (ref 10–40)
BACTEROIDES FRAGILIS: NOT DETECTED
BASOPHILS # BLD AUTO: 0.02 K/UL (ref 0–0.2)
BASOPHILS NFR BLD: 0.5 % (ref 0–1.9)
BILIRUB SERPL-MCNC: 3.9 MG/DL (ref 0.1–1)
BUN SERPL-MCNC: 9 MG/DL (ref 6–20)
CALCIUM SERPL-MCNC: 7.5 MG/DL (ref 8.7–10.5)
CANDIDA ALBICANS: NOT DETECTED
CANDIDA AURIS: NOT DETECTED
CANDIDA GLABRATA: NOT DETECTED
CANDIDA KRUSEI: NOT DETECTED
CANDIDA PARAPSILOSIS: NOT DETECTED
CANDIDA TROPICALIS: NOT DETECTED
CHLORIDE SERPL-SCNC: 110 MMOL/L (ref 95–110)
CO2 SERPL-SCNC: 14 MMOL/L (ref 23–29)
CREAT SERPL-MCNC: 0.5 MG/DL (ref 0.5–1.4)
CRYPTOCOCCUS NEOFORMANS/GATTII: NOT DETECTED
CTX-M GENE: NOT DETECTED
DIFFERENTIAL METHOD: ABNORMAL
ENTEROBACTER CLOACAE COMPLEX: NOT DETECTED
ENTEROBACTERALES: ABNORMAL
ENTEROCOCCUS FAECALIS: NOT DETECTED
ENTEROCOCCUS FAECIUM: NOT DETECTED
EOSINOPHIL # BLD AUTO: 0 K/UL (ref 0–0.5)
EOSINOPHIL NFR BLD: 0.5 % (ref 0–8)
ERYTHROCYTE [DISTWIDTH] IN BLOOD BY AUTOMATED COUNT: 17.4 % (ref 11.5–14.5)
ESCHERICHIA COLI: DETECTED
EST. GFR  (NO RACE VARIABLE): >60 ML/MIN/1.73 M^2
GLUCOSE SERPL-MCNC: 114 MG/DL (ref 70–110)
HAEMOPHILUS INFLUENZAE: NOT DETECTED
HCT VFR BLD AUTO: 26.3 % (ref 37–48.5)
HGB BLD-MCNC: 8.1 G/DL (ref 12–16)
HYPOCHROMIA BLD QL SMEAR: ABNORMAL
IMM GRANULOCYTES # BLD AUTO: 0.02 K/UL (ref 0–0.04)
IMM GRANULOCYTES NFR BLD AUTO: 0.5 % (ref 0–0.5)
IMP GENE: NOT DETECTED
KLEBSIELLA AEROGENES: NOT DETECTED
KLEBSIELLA OXYTOCA: NOT DETECTED
KLEBSIELLA PNEUMONIAE GROUP: NOT DETECTED
KPC: NOT DETECTED
LACTATE SERPL-SCNC: 3.1 MMOL/L (ref 0.5–2.2)
LISTERIA MONOCYTOGENES: NOT DETECTED
LYMPHOCYTES # BLD AUTO: 0.2 K/UL (ref 1–4.8)
LYMPHOCYTES NFR BLD: 6.2 % (ref 18–48)
MAGNESIUM SERPL-MCNC: 1.9 MG/DL (ref 1.6–2.6)
MCH RBC QN AUTO: 34.8 PG (ref 27–31)
MCHC RBC AUTO-ENTMCNC: 30.8 G/DL (ref 32–36)
MCR-1: NOT DETECTED
MCV RBC AUTO: 113 FL (ref 82–98)
MEC A/C AND MREJ (MRSA): NOT DETECTED
MEC A/C: NOT DETECTED
MONOCYTES # BLD AUTO: 0.2 K/UL (ref 0.3–1)
MONOCYTES NFR BLD: 5.4 % (ref 4–15)
NDM GENE: NOT DETECTED
NEISSERIA MENINGITIDIS: NOT DETECTED
NEUTROPHILS # BLD AUTO: 3.4 K/UL (ref 1.8–7.7)
NEUTROPHILS NFR BLD: 86.9 % (ref 38–73)
NRBC BLD-RTO: 0 /100 WBC
OXA-48-LIKE: NOT DETECTED
PHOSPHATE SERPL-MCNC: 2.5 MG/DL (ref 2.7–4.5)
PLATELET # BLD AUTO: 55 K/UL (ref 150–450)
PLATELET BLD QL SMEAR: ABNORMAL
PMV BLD AUTO: 11.6 FL (ref 9.2–12.9)
POIKILOCYTOSIS BLD QL SMEAR: SLIGHT
POLYCHROMASIA BLD QL SMEAR: ABNORMAL
POTASSIUM SERPL-SCNC: 3.6 MMOL/L (ref 3.5–5.1)
PROT SERPL-MCNC: 5.1 G/DL (ref 6–8.4)
PROTEUS SPECIES: NOT DETECTED
PSEUDOMONAS AERUGINOSA: NOT DETECTED
RBC # BLD AUTO: 2.33 M/UL (ref 4–5.4)
SALMONELLA SP: NOT DETECTED
SCHISTOCYTES BLD QL SMEAR: PRESENT
SERRATIA MARCESCENS: NOT DETECTED
SODIUM SERPL-SCNC: 134 MMOL/L (ref 136–145)
STAPHYLOCOCCUS AUREUS: NOT DETECTED
STAPHYLOCOCCUS EPIDERMIDIS: NOT DETECTED
STAPHYLOCOCCUS LUGDUNESIS: NOT DETECTED
STAPHYLOCOCCUS SPECIES: NOT DETECTED
STENOTROPHOMONAS MALTOPHILIA: NOT DETECTED
STREPTOCOCCUS AGALACTIAE: NOT DETECTED
STREPTOCOCCUS PNEUMONIAE: NOT DETECTED
STREPTOCOCCUS PYOGENES: NOT DETECTED
STREPTOCOCCUS SPECIES: NOT DETECTED
VAN A/B: NOT DETECTED
VIM GENE: NOT DETECTED
WBC # BLD AUTO: 3.87 K/UL (ref 3.9–12.7)

## 2023-08-15 PROCEDURE — A4216 STERILE WATER/SALINE, 10 ML: HCPCS | Performed by: FAMILY MEDICINE

## 2023-08-15 PROCEDURE — 83735 ASSAY OF MAGNESIUM: CPT | Performed by: FAMILY MEDICINE

## 2023-08-15 PROCEDURE — 84100 ASSAY OF PHOSPHORUS: CPT | Performed by: FAMILY MEDICINE

## 2023-08-15 PROCEDURE — 99223 PR INITIAL HOSPITAL CARE,LEVL III: ICD-10-PCS | Mod: ,,, | Performed by: INTERNAL MEDICINE

## 2023-08-15 PROCEDURE — 85025 COMPLETE CBC W/AUTO DIFF WBC: CPT | Performed by: FAMILY MEDICINE

## 2023-08-15 PROCEDURE — 36415 COLL VENOUS BLD VENIPUNCTURE: CPT | Performed by: INTERNAL MEDICINE

## 2023-08-15 PROCEDURE — 80053 COMPREHEN METABOLIC PANEL: CPT | Performed by: FAMILY MEDICINE

## 2023-08-15 PROCEDURE — 94799 UNLISTED PULMONARY SVC/PX: CPT

## 2023-08-15 PROCEDURE — 25000003 PHARM REV CODE 250: Performed by: INTERNAL MEDICINE

## 2023-08-15 PROCEDURE — 99223 1ST HOSP IP/OBS HIGH 75: CPT | Mod: ,,, | Performed by: INTERNAL MEDICINE

## 2023-08-15 PROCEDURE — 99233 PR SUBSEQUENT HOSPITAL CARE,LEVL III: ICD-10-PCS | Mod: ,,, | Performed by: PHYSICIAN ASSISTANT

## 2023-08-15 PROCEDURE — 36415 COLL VENOUS BLD VENIPUNCTURE: CPT | Performed by: FAMILY MEDICINE

## 2023-08-15 PROCEDURE — 63600175 PHARM REV CODE 636 W HCPCS: Performed by: INTERNAL MEDICINE

## 2023-08-15 PROCEDURE — C9113 INJ PANTOPRAZOLE SODIUM, VIA: HCPCS | Performed by: FAMILY MEDICINE

## 2023-08-15 PROCEDURE — 63600175 PHARM REV CODE 636 W HCPCS: Performed by: FAMILY MEDICINE

## 2023-08-15 PROCEDURE — 25000003 PHARM REV CODE 250: Performed by: FAMILY MEDICINE

## 2023-08-15 PROCEDURE — 83605 ASSAY OF LACTIC ACID: CPT | Performed by: INTERNAL MEDICINE

## 2023-08-15 PROCEDURE — 94761 N-INVAS EAR/PLS OXIMETRY MLT: CPT

## 2023-08-15 PROCEDURE — 99233 SBSQ HOSP IP/OBS HIGH 50: CPT | Mod: ,,, | Performed by: PHYSICIAN ASSISTANT

## 2023-08-15 PROCEDURE — 11000001 HC ACUTE MED/SURG PRIVATE ROOM

## 2023-08-15 RX ORDER — SODIUM,POTASSIUM PHOSPHATES 280-250MG
2 POWDER IN PACKET (EA) ORAL ONCE
Status: COMPLETED | OUTPATIENT
Start: 2023-08-15 | End: 2023-08-15

## 2023-08-15 RX ADMIN — RIFAXIMIN 550 MG: 550 TABLET ORAL at 09:08

## 2023-08-15 RX ADMIN — CEFTRIAXONE SODIUM 2 G: 2 INJECTION, POWDER, FOR SOLUTION INTRAMUSCULAR; INTRAVENOUS at 06:08

## 2023-08-15 RX ADMIN — LITHIUM CARBONATE 300 MG: 150 CAPSULE, GELATIN COATED ORAL at 09:08

## 2023-08-15 RX ADMIN — LEVETIRACETAM 1000 MG: 500 SOLUTION ORAL at 09:08

## 2023-08-15 RX ADMIN — POLYETHYLENE GLYCOL 3350 17 G: 17 POWDER, FOR SOLUTION ORAL at 09:08

## 2023-08-15 RX ADMIN — LACTULOSE 30 G: 20 SOLUTION ORAL at 09:08

## 2023-08-15 RX ADMIN — POTASSIUM & SODIUM PHOSPHATES POWDER PACK 280-160-250 MG 2 PACKET: 280-160-250 PACK at 03:08

## 2023-08-15 RX ADMIN — DOCUSATE SODIUM AND SENNOSIDES 1 TABLET: 8.6; 5 TABLET, FILM COATED ORAL at 09:08

## 2023-08-15 RX ADMIN — Medication 10 ML: at 05:08

## 2023-08-15 RX ADMIN — LACTULOSE 30 G: 20 SOLUTION ORAL at 03:08

## 2023-08-15 RX ADMIN — Medication 10 ML: at 03:08

## 2023-08-15 RX ADMIN — PANTOPRAZOLE SODIUM 40 MG: 40 INJECTION, POWDER, LYOPHILIZED, FOR SOLUTION INTRAVENOUS at 09:08

## 2023-08-15 RX ADMIN — Medication 10 ML: at 10:08

## 2023-08-15 NOTE — PROGRESS NOTES
[facility]  Progress Note    Patient Name: Marely Hamilton  MRN: [id number]  Patient Class: IP- Inpatient   Admission Date: 8/14/2023  Length of Stay: 1 days  Attending Physician: Franci Stringer MD  Primary Care Provider: Viktor Ross MD        Subjective:     Principal Problem:SBP (spontaneous bacterial peritonitis)        HPI:  Marely Hamilton is a 59 y/o F with a PMH of anemia alcohol abuse, bipolar disorder depression, fatigue, history of seizure, osteoarthritis, thrombocytopenia brought in by EMS from [facility] with complaints of confusion with patient home O2 not in place, there is associated abdominal pain, chills and then 101.4 fever when she arrived in the ED.   Na 132, K 3.5, Bun/Cr 8/0.5, , wbc 3.21, H/H 9.0/28, PLT 58, INR 1.9, Lactate 2.4, Ca 7.7, Alb 1.9, CXR Nonspecific bibasilar opacities could relate to atelectasis, aspiration or pneumonia      Overview/Hospital Course:  No notes on file    Interval History:  Patient awake alert and in no acute distress.  Remains afebrile.  Denies chest pain shortness in bed.  Only complains of abdominal distention at this time.  Patient seen and assessed along with  by bedside.  Called patient's sister Zaria after obtaining patient's permission and discussed case with her over the phone.  Discussed her platelet values and INR and also discussed my conversation with IR nurse practitioner Soraya.  IR is comfortable in performing a paracentesis as long as platelet counts are above 50,000 and INR is less than 3.  I conveyed this to Ms. Enciso.  Ms. Enciso is agreeable to paracentesis.  I then called Soraya and informed her of Ms. Enciso's decision as well.  Soraya will be calling Mr. Enciso to obtain consents    Review of Systems   Constitutional:  Negative for activity change, fatigue and fever.   Respiratory:  Negative for cough and shortness of breath.    Cardiovascular:  Negative for chest pain and leg  swelling.   Gastrointestinal:  Positive for abdominal distention. Negative for nausea and vomiting.   All other systems reviewed and are negative.    Objective:     Vital Signs (Most Recent):  Temp: 98.5 °F (36.9 °C) (08/15/23 1128)  Pulse: 99 (08/15/23 1222)  Resp: 18 (08/15/23 1128)  BP: (!) 113/53 (08/15/23 1128)  SpO2: 95 % (08/15/23 1128) Vital Signs (24h Range):  Temp:  [97.6 °F (36.4 °C)-98.8 °F (37.1 °C)] 98.5 °F (36.9 °C)  Pulse:  [76-99] 99  Resp:  [13-20] 18  SpO2:  [94 %-100 %] 95 %  BP: (113-121)/(53-59) 113/53     Weight: 52.2 kg (115 lb 1.3 oz)  Body mass index is 21.05 kg/m².    Intake/Output Summary (Last 24 hours) at 8/15/2023 1224  Last data filed at 8/15/2023 0615  Gross per 24 hour   Intake --   Output 750 ml   Net -750 ml         Physical Exam  Constitutional:       General: She is not in acute distress.     Appearance: Normal appearance.   HENT:      Head: Normocephalic.      Nose: Nose normal.      Mouth/Throat:      Mouth: Mucous membranes are moist.   Eyes:      Pupils: Pupils are equal, round, and reactive to light.   Cardiovascular:      Rate and Rhythm: Normal rate and regular rhythm.      Pulses: Normal pulses.      Heart sounds: Normal heart sounds.   Pulmonary:      Effort: Pulmonary effort is normal. No respiratory distress.      Breath sounds: Normal breath sounds.   Abdominal:      General: Bowel sounds are normal. There is distension.   Musculoskeletal:         General: No deformity.      Right lower leg: Edema present.      Left lower leg: Edema present.   Skin:     General: Skin is warm and dry.   Neurological:      General: No focal deficit present.      Mental Status: She is alert and oriented to person, place, and time.      Comments: Generalized weakness +   Psychiatric:         Mood and Affect: Mood normal.             Significant Labs: All pertinent labs within the past 24 hours have been reviewed.    Significant Imaging: I have reviewed all pertinent imaging  results/findings within the past 24 hours.      Assessment/Plan:      * SBP (spontaneous bacterial peritonitis)  Abdominal pain  -afebrile   -no leukocytosis  -Lactate on admit= 2.4  -monitor lactic acidosis  -Blood cx on 08/14/2023= g stain with Gram-negative rods.  Await final results and sensitivities  -blood PCR on 08/14/2023= E coli and Enterobacterales  -Rocephin IV daily empirically.  Ordered on 08/14/2023  -ID consulted= follow recommendations      Pneumonia  -O2 sats mid 90s on 2 L nasal cannula   - chest x-ray on 08/14/2023=Nonspecific bibasilar opacities could relate to atelectasis, aspiration or pneumonia. Interstitial coarsening appears mostly chronic, noting that a superimposed degree of acute pulmonary vascular congestion/interstitial edema is not excluded. question small right pleural effusion.  -continue antibiotics        UTI (urinary tract infection)  -UA positive  -Urine culture on 08/14/2023= presumed E coli.  Await final culture sensitivities  -Rocephin IV daily empirically.  Ordered on 08/14/2023        Thrombocytopenia  -Due to chronic liver disease  -monitor platelets  -consider transfusion of 1 unit platelets if platelet count less than 10,000       Edema  -bilateral lower extremity edema likely due to cirrhosis   -bilateral lower extremity venous ultrasound on 08/15/2023= pending         Ascites  -consult IR for diagnostic paracentesis  -discussed case with patient and patient's sister Zaria on 08/15/2023 and they are both agreeable for paracentesis  -abdominal paracentesis on 09/15/2023 by IR= pending   -ascitic fluid culture and analysis on 08/15/2023= pending      AMS (altered mental status)  -Due to cirrhosis and hyperammonemia  -Improved.  Patient is awake alert and oriented x3 currently  -ammonia level on 08/14/2023= 55 (elevated)  -Continue Lactulose p.o. t.i.d.  -continue rifaximin      Cirrhosis, Laennec's  Alcohol cirrhosis  Continue Lactulose  Rifaximin   Monitor  electrolyte      Gastrointestinal hemorrhage associated with duodenal ulcer  Continue PPI      Alcoholic cirrhosis        Bipolar 1 disorder, depressed, severe  Bipolar disorder, manic  -stable   -On Zyprexa and Olanzapine- on hold due to confusion on admit  -Continue Lithium      History of seizure  -stable   -continue Keppra     VTE Risk Mitigation (From admission, onward)         Ordered     IP VTE HIGH RISK PATIENT  Once         08/14/23 1827     Place sequential compression device  Until discontinued         08/14/23 1827                Discharge Planning   BRAYAN:      Code Status: Full Code   Is the patient medically ready for discharge?:     Reason for patient still in hospital (select all that apply): Patient trending condition, Treatment and Consult recommendations             Franci Stringer MD  Department of Hospital Medicine   [facility] - Telemetry

## 2023-08-15 NOTE — PROCEDURES
During ultrasound evaluation, insufficient ascites identified for safe paracentesis. No paracentesis performed.      Soraya Dorantes PA-C

## 2023-08-15 NOTE — ASSESSMENT & PLAN NOTE
-Due to cirrhosis and hyperammonemia  -Improved.  Patient is awake alert and oriented x3 currently  -ammonia level on 08/14/2023= 55 (elevated)  -Continue Lactulose p.o. t.i.d.  -continue rifaximin

## 2023-08-15 NOTE — PLAN OF CARE
[facility] - Telemetry  Initial Discharge Assessment       Primary Care Provider: Viktor Ross MD    Admission Diagnosis: Hepatic encephalopathy [K76.82]  Coagulopathy [D68.9]  Confusion [R41.0]  Cirrhosis, Laennec's [K70.30]  Hyperammonemia [E72.20]  Hypoalbuminemia [E88.09]  Pulmonary edema [J81.1]  Thrombocytopenia [D69.6]  SBP (spontaneous bacterial peritonitis) [K65.2]  Acute encephalopathy [G93.40]  Liver disease, chronic, with cirrhosis [K74.60, K76.9]  Sepsis [A41.9]  Anemia, unspecified type [D64.9]  Leukopenia, unspecified type [D72.819]    Admission Date: 8/14/2023  Expected Discharge Date: 8/18/2023    Consult: GI, ID, dty, & PT/OT    Payor: [employer] MANAGED MEDICARE / Plan: [employer] MEDICARE HMO / Product Type: Capitation /     Extended Emergency Contact Information  Primary Emergency Contact: Zaria Hamilton   [facility]  Home Phone: [phone]  Relation: [facility]  Secondary Emergency Contact: Bridget Hamilton  Home Phone: [phone]  Relation: Sister    Discharge Plan A: (P) Skilled Nursing Facility  Discharge Plan B: (P) New Nursing Home placement - [facility] care facility      [facility] - [place] - [address]  [address]  [place] [address]  Phone: [phone] Fax: [phone]      Initial Assessment (most recent)       Adult Discharge Assessment - 08/15/23 1305          Discharge Assessment    Assessment Type Discharge Planning Assessment     Confirmed/corrected address, phone number and insurance Yes     Confirmed Demographics Correct on Facesheet     Source of Information patient     Communicated BRAYAN with patient/caregiver Date not available/Unable to determine     Facility Arrived From: [facility]     Prior to hospitilization cognitive status: Alert/Oriented (P)      Current cognitive status: Alert/Oriented (P)      Walking or Climbing Stairs ambulation difficulty, requires equipment (P)      Dressing/Bathing bathing  difficulty, requires equipment (P)      Equipment Currently Used at Home wheelchair;grab bar (P)      Readmission within 30 days? No (P)      Patient currently being followed by outpatient case management? No (P)      Do you currently have service(s) that help you manage your care at home? No (P)      Do you take prescription medications? Yes (P)      Do you have prescription coverage? Yes (P)      Do you have any problems affording any of your prescribed medications? No (P)      Is the patient taking medications as prescribed? yes (P)      How do you get to doctors appointments? public transportation (P)      Are you on dialysis? No (P)      Do you take coumadin? No (P)      DME Needed Upon Discharge  other (see comments) (P)    tbd    Discharge Plan discussed with: Patient (P)      Discharge Plan A Skilled Nursing Facility (P)      Discharge Plan B New Nursing Home placement - halfway care facility (P)         Physical Activity    On average, how many days per week do you engage in moderate to strenuous exercise (like a brisk walk)? 0 days (P)      On average, how many minutes do you engage in exercise at this level? 0 min (P)         Financial Resource Strain    How hard is it for you to pay for the very basics like food, housing, medical care, and heating? Not hard at all (P)         Housing Stability    In the last 12 months, was there a time when you were not able to pay the mortgage or rent on time? No (P)      In the last 12 months, was there a time when you did not have a steady place to sleep or slept in a shelter (including now)? No (P)         Transportation Needs    In the past 12 months, has lack of transportation kept you from medical appointments or from getting medications? No (P)      In the past 12 months, has lack of transportation kept you from meetings, work, or from getting things needed for daily living? No (P)         Food Insecurity    Within the past 12 months, you worried that your food  would run out before you got the money to buy more. Never true (P)      Within the past 12 months, the food you bought just didn't last and you didn't have money to get more. Never true (P)         Stress    Do you feel stress - tense, restless, nervous, or anxious, or unable to sleep at night because your mind is troubled all the time - these days? Very much (P)         Social Connections    How often do you attend Caodaism or Jew services? Never (P)      Do you belong to any clubs or organizations such as Caodaism groups, unions, fraternal or athletic groups, or school groups? No (P)      How often do you attend meetings of the clubs or organizations you belong to? Never (P)      Are you , , , , never , or living with a partner? Never  (P)         Alcohol Use    Q1: How often do you have a drink containing alcohol? Never (P)      Q2: How many drinks containing alcohol do you have on a typical day when you are drinking? Patient does not drink (P)      Q3: How often do you have six or more drinks on one occasion? Never (P)                    0953  Referral sent to [facility] via [facility].      1305  Patient resting quietly in bed when CM rounded. No family present. Patient was admitted with spontaneous bacterial peritonitis & is being followed by GI, ID, & dty. Pt scheduled to have a BLE US done today.     Patient was admitted from [facility], has equipment to assist with ADLs, & will need assistance with transportation at time of discharge. Pt denied a paracentesis yesterday but is agreeable today.    CM updated patient's whiteboard with CM name & contact information.     6898  Message sent to the pt's sister, Zaria Hamilton ([phone]), requesting a return call to discuss the pt's discharge status. Awaiting response.     Pt accepted to return to [facility]. PT/OT order entered. Awaiting note with discharge recs.      9663  CM received a return call from Zaria. Pt's sister stated that the pt is aware that her mother recently  & that Zaria would like the pt to return to [facility] when medically stable to discharge. Zaria stated that she lives in [place], but is planning to come stay with the pt in [place] for a few weeks after the pt discharges from [facility] before moving the pt to an Assisted Living Facility either in [place] or [place].       Will continue to follow.

## 2023-08-15 NOTE — PLAN OF CARE
Recommendation:  1. Initiate diet when medically acceptable.   2. Monitor weight/labs.   3. Monitor need for supplements.   4. RD to follow to monitor nutrition status    Goals:  Diet will be started by RD follow up  Nutrition Goal Status: new

## 2023-08-15 NOTE — ASSESSMENT & PLAN NOTE
Abdominal pain  -afebrile   -no leukocytosis  -Lactate on admit= 2.4  -monitor lactic acidosis  -Blood cx on 08/14/2023= g stain with Gram-negative rods.  Await final results and sensitivities  -blood PCR on 08/14/2023= E coli and Enterobacterales  -Rocephin IV daily empirically.  Ordered on 08/14/2023  -ID consulted= follow recommendations

## 2023-08-15 NOTE — CONSULTS
Paracentesis Consult Note  Interventional Radiology      Reason for Consult: paracentesis    SUBJECTIVE:     Chief Complaint:  SOB    History of Present Illness:  Marely Hamilton is a 58 y.o. 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  Interventional Radiology has been consulted for image guided diagnostic and therapeutic paracentesis.  Pt has not had paracentesis in the past. She is currently afebrile. The pt is hemodynamically stable.     Pt declined paracentesis and abdominal US 8/14.  Pt's sister requested to speak with primary team before providing phone consent for paracentesis.       Review of Systems   Unable to perform ROS: Mental status change       Scheduled Meds:   cefTRIAXone (ROCEPHIN) IVPB  1 g Intravenous Q24H    lactulose  30 g Oral TID    levetiracetam  1,000 mg Oral BID    lithium carbonate  300 mg Oral QHS    pantoprazole  40 mg Intravenous BID    polyethylene glycol  17 g Oral Daily    rifAXIMin  550 mg Oral BID    senna-docusate 8.6-50 mg  1 tablet Oral BID    sodium chloride 0.9%  10 mL Intravenous Q8H     Continuous Infusions:  PRN Meds:acetaminophen, aluminum-magnesium hydroxide-simethicone, glucagon (human recombinant), glucose, glucose, ibuprofen, naloxone, ondansetron, simethicone    Review of patient's allergies indicates:   Allergen Reactions    Sulfa (sulfonamide antibiotics) Rash    Codeine Nausea And Vomiting       Past Medical History:   Diagnosis Date    Addiction to drug     Alcohol abuse     Alcohol abuse, in remission 6/15/2015    14.5 weeks ago; AA weekly    Anemia     Anxiety 6/15/2015    Behavioral problem     Bipolar disorder     Bipolar disorder in remission 6/15/2015    Cirrhosis, Laennec's 6/15/2015    Depression     Encounter for blood transfusion     Epistaxis 6/15/2015     Fatigue     Glaucoma     Hematuria     Hepatic encephalopathy 6/15/2015    Hepatic enlargement     History of psychiatric care     History of psychiatric hospitalization     History of seizure 6/15/2015    1    hx of intentional Tylenol overdose 6/15/2015    2005- situational and hx of bipolar    Hx of psychiatric care     Macrocytic anemia 9/18/2015    6 units PRBC since June 2015    [facility]     Osteoarthritis     Other ascites 6/15/2015    Psychiatric exam requested by authority     Psychiatric problem     Psychosis 9/26/2019    Renal disorder     Seizures     Self-harming behavior     Suicide attempt     Therapy     Thrombocytopenia 6/15/2015     Past Surgical History:   Procedure Laterality Date    COSMETIC SURGERY      EMBOLIZATION N/A 6/11/2023    Procedure: EMBOLIZATION, BLOOD VESSEL;  Surgeon: Debbie Surgeon;  Location: [facility];  Service: Anesthesiology;  Laterality: N/A;    ESOPHAGOGASTRODUODENOSCOPY      ESOPHAGOGASTRODUODENOSCOPY N/A 7/6/2023    Procedure: EGD (ESOPHAGOGASTRODUODENOSCOPY);  Surgeon: Bernice Guillen MD;  Location: [facility] ([address] [place] [address]);  Service: Endoscopy;  Laterality: N/A;    ESOPHAGOGASTRODUODENOSCOPY N/A 7/11/2023    Procedure: EGD (ESOPHAGOGASTRODUODENOSCOPY);  Surgeon: Rangel Broussard MD;  Location: [facility] ([address] [place] [address]);  Service: Endoscopy;  Laterality: N/A;     Family History   Problem Relation Age of Onset    Alcohol abuse Father     Suicide Father     Bipolar disorder Father     Alcohol abuse Sister     Hypertension Mother     Alcohol abuse Paternal Grandfather     Drug abuse Neg Hx     Dementia Neg Hx      Social History     Tobacco Use    Smoking status: Never    Smokeless tobacco: Never   Substance Use Topics    Alcohol use: Not Currently     Comment: hx of ETOH abuse with cirrhosis of liver    Drug use: No       OBJECTIVE:     Vital Signs (Most Recent)  Temp: 98.4 °F (36.9 °C) (08/15/23 0745)  Pulse: 89 (08/15/23 0745)  Resp: 18 (08/15/23 0745)  BP: (!) 117/56 (08/15/23  0745)  SpO2: 96 % (08/15/23 0745)    Physical Exam:  Physical Exam  Vitals and nursing note reviewed.   Constitutional:       Appearance: Normal appearance. She is ill-appearing.   HENT:      Head: Normocephalic and atraumatic.      Right Ear: External ear normal.      Left Ear: External ear normal.      Nose: Nose normal.   Eyes:      General: Scleral icterus present.      Extraocular Movements: Extraocular movements intact.   Cardiovascular:      Rate and Rhythm: Normal rate.   Pulmonary:      Effort: Pulmonary effort is normal.      Comments: On RA  Abdominal:      General: Abdomen is flat. There is distension.   Musculoskeletal:      Cervical back: Normal range of motion and neck supple.      Right lower leg: Edema present.      Left lower leg: Edema present.   Skin:     General: Skin is warm and dry.   Neurological:      Mental Status: She is alert. She is disoriented.      Comments: Waxing and waning, incorrect year first visit.  2nd visit AA x 3.  Knows sister Zaria         Laboratory  I have reviewed all pertinent lab results within the past 24 hours.  CBC:   Recent Labs   Lab 08/15/23  0450   WBC 3.87*   RBC 2.33*   HGB 8.1*   HCT 26.3*   PLT 55*   *   MCH 34.8*   MCHC 30.8*     CMP:   Recent Labs   Lab 08/15/23  0450   *   CALCIUM 7.5*   ALBUMIN 1.7*   PROT 5.1*   *   K 3.6   CO2 14*      BUN 9   CREATININE 0.5   ALKPHOS 87   ALT 13   AST 25   BILITOT 3.9*     Coagulation:   Recent Labs   Lab 08/14/23  0913   LABPROT 20.3*   INR 1.9*   APTT 38.4*       ASA/Mallampati  ASA: 3  Mallampati: n/a    Imaging:  Recent imaging studies including  CXR  on 8/14/23 which was independently reviewed by Soraya Dorantes PA-C.     ASSESSMENT/PLAN:     Assessment:  58 y.o. female with a PMHx of Alcohol abuse, in remission (6/15/2015), Anemia, Anxiety (6/15/2015), Behavioral problem, Bipolar disorder, Bipolar disorder in remission (6/15/2015), Cirrhosis, Laennec's (6/15/2015), who has been referred  to IR for image guided  diagnostic and therapeutic  paracentesis. Pt has not had paracentesis in the past.      The procedure, rationale for and against, goals of care (diagnosis), expectations and risks (including but not limited to, pain, bleeding, infection, damage to nearby structures, need for additional procedures), potential benefits, and alternatives were discussed with the patient's sister Zaria.  All questions were answered to the best of my abilities.  The patient's sister wishes to speak to the primary team prior to proceeding with paracentesis.        Plan:  Pt agreeable to paracentesis today however cannot consent for herself.  Pt's sister Zaria wishes to speak with primary team prior to providing consent.  Please contact IR after speaking with family.  Pt DOES NOT need to be NPO.  Anticoagulation history reviewed.  Coagulation labs reviewed.  Plts >50k and INR<3.0, can proceed.       Thank you for the consult. Please contact with questions via [employer] secure chat.     Soraya Dorantes PA-C  Interventional Radiology

## 2023-08-15 NOTE — SUBJECTIVE & OBJECTIVE
Interval History:  Patient awake alert and in no acute distress.  Remains afebrile.  Denies chest pain shortness in bed.  Only complains of abdominal distention at this time.  Patient seen and assessed along with  by bedside.  Called patient's sister Zaria after obtaining patient's permission and discussed case with her over the phone.  Discussed her platelet values and INR and also discussed my conversation with IR nurse practitioner Soraya.  IR is comfortable in performing a paracentesis as long as platelet counts are above 50,000 and INR is less than 3.  I conveyed this to Ms. Enciso.  Ms. Enciso is agreeable to paracentesis.  I then called Soraya and informed her of Ms. Enciso's decision as well.  Soraya will be calling Mr. Enciso to obtain consents    Review of Systems   Constitutional:  Negative for activity change, fatigue and fever.   Respiratory:  Negative for cough and shortness of breath.    Cardiovascular:  Negative for chest pain and leg swelling.   Gastrointestinal:  Positive for abdominal distention. Negative for nausea and vomiting.   All other systems reviewed and are negative.    Objective:     Vital Signs (Most Recent):  Temp: 98.5 °F (36.9 °C) (08/15/23 1128)  Pulse: 99 (08/15/23 1222)  Resp: 18 (08/15/23 1128)  BP: (!) 113/53 (08/15/23 1128)  SpO2: 95 % (08/15/23 1128) Vital Signs (24h Range):  Temp:  [97.6 °F (36.4 °C)-98.8 °F (37.1 °C)] 98.5 °F (36.9 °C)  Pulse:  [76-99] 99  Resp:  [13-20] 18  SpO2:  [94 %-100 %] 95 %  BP: (113-121)/(53-59) 113/53     Weight: 52.2 kg (115 lb 1.3 oz)  Body mass index is 21.05 kg/m².    Intake/Output Summary (Last 24 hours) at 8/15/2023 1224  Last data filed at 8/15/2023 0615  Gross per 24 hour   Intake --   Output 750 ml   Net -750 ml         Physical Exam  Constitutional:       General: She is not in acute distress.     Appearance: Normal appearance.   HENT:      Head: Normocephalic.      Nose: Nose normal.      Mouth/Throat:      Mouth: Mucous  membranes are moist.   Eyes:      Pupils: Pupils are equal, round, and reactive to light.   Cardiovascular:      Rate and Rhythm: Normal rate and regular rhythm.      Pulses: Normal pulses.      Heart sounds: Normal heart sounds.   Pulmonary:      Effort: Pulmonary effort is normal. No respiratory distress.      Breath sounds: Normal breath sounds.   Abdominal:      General: Bowel sounds are normal. There is distension.   Musculoskeletal:         General: No deformity.      Right lower leg: Edema present.      Left lower leg: Edema present.   Skin:     General: Skin is warm and dry.   Neurological:      General: No focal deficit present.      Mental Status: She is alert and oriented to person, place, and time.      Comments: Generalized weakness +   Psychiatric:         Mood and Affect: Mood normal.             Significant Labs: All pertinent labs within the past 24 hours have been reviewed.    Significant Imaging: I have reviewed all pertinent imaging results/findings within the past 24 hours.

## 2023-08-15 NOTE — ASSESSMENT & PLAN NOTE
-O2 sats mid 90s on 2 L nasal cannula   - chest x-ray on 08/14/2023=Nonspecific bibasilar opacities could relate to atelectasis, aspiration or pneumonia. Interstitial coarsening appears mostly chronic, noting that a superimposed degree of acute pulmonary vascular congestion/interstitial edema is not excluded. question small right pleural effusion.  -continue antibiotics

## 2023-08-15 NOTE — CONSULTS
"  [facility]  [facility]  Consult Note    SUMMARY     Recommendations    Recommendation:  1. Initiate diet when medically acceptable.   2. Monitor weight/labs.   3. Monitor need for supplements.   4. RD to follow to monitor nutrition status    Goals:  Diet will be started by RD follow up  Nutrition Goal Status: new  Communication of RD Recs: reviewed with RN    Assessment and Plan  Nutrition Problem  Inadequate energy intake    Related to (etiology):   Dx/hx    Signs and Symptoms (as evidenced by):   NPO     Interventions:  Collaboration with other providers    Nutrition Diagnosis Status:   New      Malnutrition Assessment             Weight Loss (Malnutrition):  (12% x 1 month)                         Reason for Assessment    Reason For Assessment: consult (unintentional weight loss)  Diagnosis:  (spontaneous bacterial peritonitis)  Relevant Medical History: glaucoma, OA, bipolar disorder, depression, seizures, suicide attempt, cirrhosis, anxiety, alcohol abuse  General Information Comments: Pt NPO. Admitted from [facility]. Francisco 13-skin intact. Noted 15lb weight loss x 1 month. Pt asleep at visit-unable to assess NFPE  Nutrition Discharge Planning: d/c needs to be determined    Nutrition Risk Screen    Nutrition Risk Screen: unintentional loss of 10 lbs or more in the past 2 months    Nutrition/Diet History    Food Preferences: no Alevism or cultural food prefs identified  Factors Affecting Nutritional Intake: NPO    Anthropometrics    Temp: 98.4 °F (36.9 °C)  Height Method: Stated  Height: 5' 2" (157.5 cm)  Height (inches): 62 in  Weight Method: Bed Scale  Weight: 52.2 kg (115 lb 1.3 oz)  Weight (lb): 115.08 lb  Ideal Body Weight (IBW), Female: 110 lb  % Ideal Body Weight, Female (lb): 104.62 %  BMI (Calculated): 21  BMI Grade: 18.5-24.9 - normal  Usual Body Weight (UBW), k.4 kg ()  % Usual Body Weight: 88.06  % Weight Change From Usual Weight: -12.12 %   "     Lab/Procedures/Meds    Pertinent Labs Reviewed: reviewed  Pertinent Labs Comments: Na 134L, Glu 114H, Ca 7.5L, Phos 2.5L, Alb 1.7L  Pertinent Medications Reviewed: reviewed  Pertinent Medications Comments: rocephin, lactulose, lithium, pantoprazole, senna    Physical Findings/Assessment         Estimated/Assessed Needs    Weight Used For Calorie Calculations: 52.2 kg (115 lb 1.3 oz)  Energy Calorie Requirements (kcal): 3836-3984 (30-35 kcal/kg)  Energy Need Method: Kcal/kg  Protein Requirements: 62g (1.2g/kg)  Weight Used For Protein Calculations: 52.2 kg (115 lb 1.3 oz)     Estimated Fluid Requirement Method: RDA Method  RDA Method (mL): 1566         Nutrition Prescription Ordered    Current Diet Order: NPO    Evaluation of Received Nutrient/Fluid Intake    I/O: 0/750  Energy Calories Required: not meeting needs  Protein Required: not meeting needs  Fluid Required: not meeting needs  Comments: LBM 8/14  % Intake of Estimated Energy Needs: Other: NPO  % Meal Intake: NPO    Nutrition Risk  Level of Risk/Frequency of Follow-up:  (2xweekly)     Monitor and Evaluation  Food and Nutrient Intake: food and beverage intake  Food and Nutrient Adminstration: diet order  Physical Activity and Function: nutrition-related ADLs and IADLs  Anthropometric Measurements: weight  Biochemical Data, Medical Tests and Procedures: electrolyte and renal panel  Nutrition-Focused Physical Findings: overall appearance     Nutrition Follow-Up  RD Follow-up?: Yes

## 2023-08-15 NOTE — ASSESSMENT & PLAN NOTE
-Due to chronic liver disease  -monitor platelets  -consider transfusion of 1 unit platelets if platelet count less than 10,000

## 2023-08-15 NOTE — ASSESSMENT & PLAN NOTE
-bilateral lower extremity edema likely due to cirrhosis   -bilateral lower extremity venous ultrasound on 08/15/2023= pending

## 2023-08-15 NOTE — CONSULTS
[facility] - Telemetry  Gastroenterology  Consult Note    Patient Name: Marely Hamilton  MRN: [id number]  Admission Date: 8/14/2023  Hospital Length of Stay: 1 days  Code Status: Full Code   Attending Provider: Franci Stringer MD   Consulting Provider: Kishor Walsh MD  Primary Care Physician: Viktor Ross MD  Principal Problem:SBP (spontaneous bacterial peritonitis)    Inpatient consult to Gastroenterology  Consult performed by: Kishor Walsh MD  Consult ordered by: Phong Jean DO      Gastroenterology  Consult performed by: Terrence Arriola MD  Consult ordered by: Zoë Sanchez MD        Subjective:     HPI:  The patient is a 58-year-old female with a past medical history of alcohol use disorder, bipolar disorder, alcoholic cirrhosis who presents to [facility] from [facility] due to witnessed shortness of breath at rest and worsened encephalopathy from baseline. History is difficult to obtain and supplemented per chart review and hospital staff. Per chart review, the patient complained of abdominal pain, fever, chills. On interview, the patient has no acute complaints. She denies chills, fever, abdominal pain, shortness of breath, swelling. She verbalizes her frustration with not being able to eat.      Past Medical History:   Diagnosis Date    Addiction to drug     Alcohol abuse     Alcohol abuse, in remission 6/15/2015    14.5 weeks ago; AA weekly    Anemia     Anxiety 6/15/2015    Behavioral problem     Bipolar disorder     Bipolar disorder in remission 6/15/2015    Cirrhosis, Laennec's 6/15/2015    Depression     Encounter for blood transfusion     Epistaxis 6/15/2015    Fatigue     Glaucoma     Hematuria     Hepatic encephalopathy 6/15/2015    Hepatic enlargement     History of psychiatric care     History of psychiatric hospitalization     History of seizure 6/15/2015    1    hx of intentional Tylenol overdose 6/15/2015    2005- situational and hx  of bipolar    Hx of psychiatric care     Macrocytic anemia 9/18/2015    6 units PRBC since June 2015    Jeana     Osteoarthritis     Other ascites 6/15/2015    Psychiatric exam requested by authority     Psychiatric problem     Psychosis 9/26/2019    Renal disorder     Seizures     Self-harming behavior     Suicide attempt     Therapy     Thrombocytopenia 6/15/2015       Past Surgical History:   Procedure Laterality Date    COSMETIC SURGERY      EMBOLIZATION N/A 6/11/2023    Procedure: EMBOLIZATION, BLOOD VESSEL;  Surgeon: Dbebie Surgeon;  Location: [facility];  Service: Anesthesiology;  Laterality: N/A;    ESOPHAGOGASTRODUODENOSCOPY      ESOPHAGOGASTRODUODENOSCOPY N/A 7/6/2023    Procedure: EGD (ESOPHAGOGASTRODUODENOSCOPY);  Surgeon: Bernice Guillen MD;  [facility]  Service: Endoscopy;  Laterality: N/A;    ESOPHAGOGASTRODUODENOSCOPY N/A 7/11/2023    Procedure: EGD (ESOPHAGOGASTRODUODENOSCOPY);  Surgeon: Rangel Broussard MD;  [facility]  Service: Endoscopy;  Laterality: N/A;       Review of patient's allergies indicates:   Allergen Reactions    Sulfa (sulfonamide antibiotics) Rash    Codeine Nausea And Vomiting     Family History       Problem Relation (Age of Onset)    Alcohol abuse Father, Sister, Paternal Grandfather    Bipolar disorder Father    Hypertension Mother    Suicide Father          Tobacco Use    Smoking status: Never    Smokeless tobacco: Never   Substance and Sexual Activity    Alcohol use: Not Currently     Comment: hx of ETOH abuse with cirrhosis of liver    Drug use: No    Sexual activity: Not Currently     Review of Systems   Constitutional:  Positive for chills and fever.   Gastrointestinal:  Positive for abdominal distention and abdominal pain. Negative for blood in stool, constipation, nausea and vomiting.   Musculoskeletal:  Negative for neck pain and neck stiffness.   Skin:  Negative for pallor and rash.   Neurological:  Negative for dizziness and  light-headedness.   Psychiatric/Behavioral:  Positive for confusion. Negative for agitation.      Objective:     Vital Signs (Most Recent):  Temp: 98.5 °F (36.9 °C) (08/15/23 1128)  Pulse: 99 (08/15/23 1222)  Resp: 18 (08/15/23 1128)  BP: (!) 113/53 (08/15/23 1128)  SpO2: 95 % (08/15/23 1128) Vital Signs (24h Range):  Temp:  [97.6 °F (36.4 °C)-98.8 °F (37.1 °C)] 98.5 °F (36.9 °C)  Pulse:  [76-99] 99  Resp:  [13-20] 18  SpO2:  [94 %-100 %] 95 %  BP: (113-121)/(53-59) 113/53     Weight: 52.2 kg (115 lb 1.3 oz) (08/15/23 1059)  Body mass index is 21.05 kg/m².      Intake/Output Summary (Last 24 hours) at 8/15/2023 1313  Last data filed at 8/15/2023 0615  Gross per 24 hour   Intake --   Output 750 ml   Net -750 ml       Lines/Drains/Airways       Peripheral Intravenous Line  Duration                  External Jugular IV 08/14/23 0913 1 day         Peripheral IV - Single Lumen 08/14/23 0914 18 G Left 1 day                     Physical Exam  Constitutional:       General: She is not in acute distress.  HENT:      Head: Normocephalic and atraumatic.      Right Ear: External ear normal.      Left Ear: External ear normal.      Nose: Nose normal. No congestion.      Mouth/Throat:      Mouth: Mucous membranes are moist.      Pharynx: Oropharynx is clear.   Eyes:      General: No scleral icterus.        Right eye: No discharge.         Left eye: No discharge.      Extraocular Movements: Extraocular movements intact.   Cardiovascular:      Rate and Rhythm: Normal rate and regular rhythm.      Heart sounds: Murmur heard.   Pulmonary:      Effort: Pulmonary effort is normal. No respiratory distress.   Abdominal:      General: Bowel sounds are normal. There is distension.      Tenderness: There is no abdominal tenderness.      Comments: Slightly distended abdomen. Nontender to palpation in all 4 quadrants   Musculoskeletal:      Right lower leg: Edema present.      Left lower leg: Edema present.      Comments: +2 pitting edema to  level of mid thigh   Skin:     General: Skin is warm and dry.      Findings: No rash.   Neurological:      General: No focal deficit present.      Mental Status: She is alert and oriented to person, place, and time.          Significant Labs:  CBC:   Recent Labs   Lab 08/14/23  0913 08/15/23  0450   WBC 3.21* 3.87*   HGB 9.0* 8.1*   HCT 28.0* 26.3*   PLT 58* 55*     CMP:   Recent Labs   Lab 08/15/23  0450   *   CALCIUM 7.5*   ALBUMIN 1.7*   PROT 5.1*   *   K 3.6   CO2 14*      BUN 9   CREATININE 0.5   ALKPHOS 87   ALT 13   AST 25   BILITOT 3.9*     Coagulation:   Recent Labs   Lab 08/14/23 0913   INR 1.9*   APTT 38.4*       Significant Imaging:  CXR: I have reviewed all results within the past 24 hours and my personal findings are:  no large effusion or consolidation appreciated, possible prominent pulmonary vascular congestion    Assessment/Plan:     GI  Alcoholic cirrhosis  Patient with hx of decompensated alcoholic cirrhosis, has followed [facility] and [facility] in the past. Per chart review, denied liver transplant in 2015 due to malnutrition. Per note in July 2023, will corodinate re-evaluation for lvier tranpslant in outaptient setting. No hx of paracentesis. No signs of active GI bleeding at this time. MELD 22. Child Raymundo class C.     Plan:  - Continue home spironolactone, lasix  - Continue home lactulose, titrate to 3 bowel movements per day  - Introduce 2000 mg sodium daily restriction  - Followup with outpatient [facility] upon discharge     Ascites  - Agree with diagnostic paracentesis   - Please obtain the following labs: WBC with diff, cell count, protein, albumin, Gram stain with culture, LDH  - Will followup paracentesis studies   - Antibiotic recommendations pending results        Thank you for your consult. I will follow-up with patient. Please contact us if you have any additional questions.    Kishor Walsh MD  Gastroenterology  [facility] - Telemetry

## 2023-08-15 NOTE — ASSESSMENT & PLAN NOTE
-UA positive  -Urine culture on 08/14/2023= presumed E coli.  Await final culture sensitivities  -Rocephin IV daily empirically.  Ordered on 08/14/2023

## 2023-08-15 NOTE — HPI
The patient is a 58-year-old female with a past medical history of alcohol use disorder, bipolar disorder, alcoholic cirrhosis who presents to [facility] from [facility] due to witnessed shortness of breath at rest and worsened encephalopathy from baseline. History is difficult to obtain and supplemented per chart review and hospital staff. Per chart review, the patient complained of abdominal pain, fever, chills. On interview, the patient has no acute complaints. She denies chills, fever, abdominal pain, shortness of breath, swelling. She verbalizes her frustration with not being able to eat.

## 2023-08-15 NOTE — ASSESSMENT & PLAN NOTE
-consult IR for diagnostic paracentesis  -discussed case with patient and patient's sister Zaria on 08/15/2023 and they are both agreeable for paracentesis  -abdominal paracentesis on 09/15/2023 by IR= pending   -ascitic fluid culture and analysis on 08/15/2023= pending

## 2023-08-15 NOTE — ASSESSMENT & PLAN NOTE
- Agree with diagnostic paracentesis   - Please obtain the following labs: WBC with diff, cell count, protein, albumin, Gram stain with culture, LDH  - Will followup paracentesis studies   - Antibiotic recommendations pending results

## 2023-08-15 NOTE — ASSESSMENT & PLAN NOTE
Bipolar disorder, manic  -stable   -On Zyprexa and Olanzapine- on hold due to confusion on admit  -Continue Lithium

## 2023-08-15 NOTE — ASSESSMENT & PLAN NOTE
Patient with hx of decompensated alcoholic cirrhosis, has followed [facility] and [facility] in the past. Per chart review, denied liver transplant in 2015 due to malnutrition. Per note in July 2023, will corodinate re-evaluation for lvier tranpslant in outaptient setting. No hx of paracentesis. No signs of active GI bleeding at this time. MELD 22. Child Raymundo class C.     Plan:  - Continue home spironolactone, lasix  - Continue home lactulose, titrate to 3 bowel movements per day  - Introduce 2000 mg sodium daily restriction  - Followup with outpatient [facility] upon discharge

## 2023-08-15 NOTE — SUBJECTIVE & OBJECTIVE
Past Medical History:   Diagnosis Date    Addiction to drug     Alcohol abuse     Alcohol abuse, in remission 6/15/2015    14.5 weeks ago; AA weekly    Anemia     Anxiety 6/15/2015    Behavioral problem     Bipolar disorder     Bipolar disorder in remission 6/15/2015    Cirrhosis, Laennec's 6/15/2015    Depression     Encounter for blood transfusion     Epistaxis 6/15/2015    Fatigue     Glaucoma     Hematuria     Hepatic encephalopathy 6/15/2015    Hepatic enlargement     History of psychiatric care     History of psychiatric hospitalization     History of seizure 6/15/2015    1    hx of intentional Tylenol overdose 6/15/2015    2005- situational and hx of bipolar    Hx of psychiatric care     Macrocytic anemia 9/18/2015    6 units PRBC since June 2015    Jeana     Osteoarthritis     Other ascites 6/15/2015    Psychiatric exam requested by authority     Psychiatric problem     Psychosis 9/26/2019    Renal disorder     Seizures     Self-harming behavior     Suicide attempt     Therapy     Thrombocytopenia 6/15/2015       Past Surgical History:   Procedure Laterality Date    COSMETIC SURGERY      EMBOLIZATION N/A 6/11/2023    Procedure: EMBOLIZATION, BLOOD VESSEL;  Surgeon: Debbie Surgeon;  Location: [facility];  Service: Anesthesiology;  Laterality: N/A;    ESOPHAGOGASTRODUODENOSCOPY      ESOPHAGOGASTRODUODENOSCOPY N/A 7/6/2023    Procedure: EGD (ESOPHAGOGASTRODUODENOSCOPY);  Surgeon: Bernice Guillen MD;  Location: [address]);  Service: Endoscopy;  Laterality: N/A;    ESOPHAGOGASTRODUODENOSCOPY N/A 7/11/2023    Procedure: EGD (ESOPHAGOGASTRODUODENOSCOPY);  Surgeon: Rangel Broussard MD;  Location: [address]);  Service: Endoscopy;  Laterality: N/A;       Review of patient's allergies indicates:   Allergen Reactions    Sulfa (sulfonamide antibiotics) Rash    Codeine Nausea And Vomiting     Family History       Problem Relation (Age of Onset)    Alcohol abuse Father, Sister, Paternal Grandfather     Bipolar disorder Father    Hypertension Mother    Suicide Father          Tobacco Use    Smoking status: Never    Smokeless tobacco: Never   Substance and Sexual Activity    Alcohol use: Not Currently     Comment: hx of ETOH abuse with cirrhosis of liver    Drug use: No    Sexual activity: Not Currently     Review of Systems   Constitutional:  Positive for chills and fever.   Gastrointestinal:  Positive for abdominal distention and abdominal pain. Negative for blood in stool, constipation, nausea and vomiting.   Musculoskeletal:  Negative for neck pain and neck stiffness.   Skin:  Negative for pallor and rash.   Neurological:  Negative for dizziness and light-headedness.   Psychiatric/Behavioral:  Positive for confusion. Negative for agitation.      Objective:     Vital Signs (Most Recent):  Temp: 98.5 °F (36.9 °C) (08/15/23 1128)  Pulse: 99 (08/15/23 1222)  Resp: 18 (08/15/23 1128)  BP: (!) 113/53 (08/15/23 1128)  SpO2: 95 % (08/15/23 1128) Vital Signs (24h Range):  Temp:  [97.6 °F (36.4 °C)-98.8 °F (37.1 °C)] 98.5 °F (36.9 °C)  Pulse:  [76-99] 99  Resp:  [13-20] 18  SpO2:  [94 %-100 %] 95 %  BP: (113-121)/(53-59) 113/53     Weight: 52.2 kg (115 lb 1.3 oz) (08/15/23 1059)  Body mass index is 21.05 kg/m².      Intake/Output Summary (Last 24 hours) at 8/15/2023 1313  Last data filed at 8/15/2023 0615  Gross per 24 hour   Intake --   Output 750 ml   Net -750 ml       Lines/Drains/Airways       Peripheral Intravenous Line  Duration                  External Jugular IV 08/14/23 0913 1 day         Peripheral IV - Single Lumen 08/14/23 0914 18 G Left 1 day                     Physical Exam  Constitutional:       General: She is not in acute distress.  HENT:      Head: Normocephalic and atraumatic.      Right Ear: External ear normal.      Left Ear: External ear normal.      Nose: Nose normal. No congestion.      Mouth/Throat:      Mouth: Mucous membranes are moist.      Pharynx: Oropharynx is clear.   Eyes:      General:  No scleral icterus.        Right eye: No discharge.         Left eye: No discharge.      Extraocular Movements: Extraocular movements intact.   Cardiovascular:      Rate and Rhythm: Normal rate and regular rhythm.      Heart sounds: Murmur heard.   Pulmonary:      Effort: Pulmonary effort is normal. No respiratory distress.   Abdominal:      General: Bowel sounds are normal. There is distension.      Tenderness: There is no abdominal tenderness.      Comments: Slightly distended abdomen. Nontender to palpation in all 4 quadrants   Musculoskeletal:      Right lower leg: Edema present.      Left lower leg: Edema present.      Comments: +2 pitting edema to level of mid thigh   Skin:     General: Skin is warm and dry.      Findings: No rash.   Neurological:      General: No focal deficit present.      Mental Status: She is alert and oriented to person, place, and time.          Significant Labs:  CBC:   Recent Labs   Lab 08/14/23  0913 08/15/23  0450   WBC 3.21* 3.87*   HGB 9.0* 8.1*   HCT 28.0* 26.3*   PLT 58* 55*     CMP:   Recent Labs   Lab 08/15/23  0450   *   CALCIUM 7.5*   ALBUMIN 1.7*   PROT 5.1*   *   K 3.6   CO2 14*      BUN 9   CREATININE 0.5   ALKPHOS 87   ALT 13   AST 25   BILITOT 3.9*     Coagulation:   Recent Labs   Lab 08/14/23 0913   INR 1.9*   APTT 38.4*       Significant Imaging:  CXR: I have reviewed all results within the past 24 hours and my personal findings are:  no large effusion or consolidation appreciated, possible prominent pulmonary vascular congestion

## 2023-08-16 PROBLEM — I82.501 CHRONIC DEEP VEIN THROMBOSIS (DVT) OF RIGHT LOWER EXTREMITY: Status: ACTIVE | Noted: 2023-08-16

## 2023-08-16 PROBLEM — R78.81 GRAM-NEGATIVE BACTEREMIA: Status: ACTIVE | Noted: 2023-08-15

## 2023-08-16 PROBLEM — A41.9 SEVERE SEPSIS: Status: ACTIVE | Noted: 2023-08-16

## 2023-08-16 PROBLEM — R65.20 SEVERE SEPSIS: Status: ACTIVE | Noted: 2023-08-16

## 2023-08-16 LAB
ALBUMIN SERPL BCP-MCNC: 1.7 G/DL (ref 3.5–5.2)
ALP SERPL-CCNC: 117 U/L (ref 55–135)
ALT SERPL W/O P-5'-P-CCNC: 12 U/L (ref 10–44)
ANION GAP SERPL CALC-SCNC: 8 MMOL/L (ref 8–16)
AST SERPL-CCNC: 33 U/L (ref 10–40)
AV INDEX (PROSTH): 0.51
AV MEAN GRADIENT: 12 MMHG
AV PEAK GRADIENT: 22 MMHG
AV VALVE AREA BY VELOCITY RATIO: 1.5 CM²
AV VALVE AREA: 1.5 CM²
AV VELOCITY RATIO: 0.51
BACTERIA UR CULT: ABNORMAL
BASOPHILS # BLD AUTO: 0.01 K/UL (ref 0–0.2)
BASOPHILS NFR BLD: 0.2 % (ref 0–1.9)
BILIRUB SERPL-MCNC: 2.5 MG/DL (ref 0.1–1)
BSA FOR ECHO PROCEDURE: 1.51 M2
BUN SERPL-MCNC: 7 MG/DL (ref 6–20)
CALCIUM SERPL-MCNC: 7.4 MG/DL (ref 8.7–10.5)
CHLORIDE SERPL-SCNC: 111 MMOL/L (ref 95–110)
CO2 SERPL-SCNC: 15 MMOL/L (ref 23–29)
CREAT SERPL-MCNC: 0.6 MG/DL (ref 0.5–1.4)
CV ECHO LV RWT: 0.54 CM
DIFFERENTIAL METHOD: ABNORMAL
DOP CALC AO PEAK VEL: 2.35 M/S
DOP CALC AO VTI: 42.4 CM
DOP CALC LVOT AREA: 3 CM2
DOP CALC LVOT DIAMETER: 1.94 CM
DOP CALC LVOT PEAK VEL: 1.19 M/S
DOP CALC LVOT STROKE VOLUME: 63.52 CM3
DOP CALC MV VTI: 29 CM
DOP CALCLVOT PEAK VEL VTI: 21.5 CM
E WAVE DECELERATION TIME: 186.85 MSEC
E/A RATIO: 1.24
E/E' RATIO: 5.83 M/S
ECHO LV POSTERIOR WALL: 1.32 CM (ref 0.6–1.1)
EJECTION FRACTION: 70 %
EOSINOPHIL # BLD AUTO: 0.2 K/UL (ref 0–0.5)
EOSINOPHIL NFR BLD: 4 % (ref 0–8)
ERYTHROCYTE [DISTWIDTH] IN BLOOD BY AUTOMATED COUNT: 17.2 % (ref 11.5–14.5)
EST. GFR  (NO RACE VARIABLE): >60 ML/MIN/1.73 M^2
FRACTIONAL SHORTENING: 41 % (ref 28–44)
GLUCOSE SERPL-MCNC: 144 MG/DL (ref 70–110)
HCT VFR BLD AUTO: 24.8 % (ref 37–48.5)
HGB BLD-MCNC: 7.9 G/DL (ref 12–16)
IMM GRANULOCYTES # BLD AUTO: 0.02 K/UL (ref 0–0.04)
IMM GRANULOCYTES NFR BLD AUTO: 0.4 % (ref 0–0.5)
INTERVENTRICULAR SEPTUM: 0.87 CM (ref 0.6–1.1)
LA MAJOR: 5.96 CM
LA MINOR: 5.12 CM
LA WIDTH: 3.5 CM
LEFT ATRIUM SIZE: 3.46 CM
LEFT ATRIUM VOLUME INDEX MOD: 26.9 ML/M2
LEFT ATRIUM VOLUME INDEX: 37.5 ML/M2
LEFT ATRIUM VOLUME MOD: 40.68 CM3
LEFT ATRIUM VOLUME: 56.7 CM3
LEFT INTERNAL DIMENSION IN SYSTOLE: 2.84 CM (ref 2.1–4)
LEFT VENTRICLE DIASTOLIC VOLUME INDEX: 73.02 ML/M2
LEFT VENTRICLE DIASTOLIC VOLUME: 110.26 ML
LEFT VENTRICLE MASS INDEX: 130 G/M2
LEFT VENTRICLE SYSTOLIC VOLUME INDEX: 20.3 ML/M2
LEFT VENTRICLE SYSTOLIC VOLUME: 30.6 ML
LEFT VENTRICULAR INTERNAL DIMENSION IN DIASTOLE: 4.85 CM (ref 3.5–6)
LEFT VENTRICULAR MASS: 195.98 G
LV LATERAL E/E' RATIO: 5.1 M/S
LV SEPTAL E/E' RATIO: 6.8 M/S
LVOT MG: 3.02 MMHG
LVOT MV: 0.83 CM/S
LYMPHOCYTES # BLD AUTO: 0.4 K/UL (ref 1–4.8)
LYMPHOCYTES NFR BLD: 9.9 % (ref 18–48)
MAGNESIUM SERPL-MCNC: 1.9 MG/DL (ref 1.6–2.6)
MCH RBC QN AUTO: 34.6 PG (ref 27–31)
MCHC RBC AUTO-ENTMCNC: 31.9 G/DL (ref 32–36)
MCV RBC AUTO: 109 FL (ref 82–98)
MONOCYTES # BLD AUTO: 0.3 K/UL (ref 0.3–1)
MONOCYTES NFR BLD: 7 % (ref 4–15)
MV MEAN GRADIENT: 2 MMHG
MV PEAK A VEL: 0.82 M/S
MV PEAK E VEL: 1.02 M/S
MV PEAK GRADIENT: 5 MMHG
MV STENOSIS PRESSURE HALF TIME: 59.07 MS
MV VALVE AREA BY CONTINUITY EQUATION: 2.19 CM2
MV VALVE AREA P 1/2 METHOD: 3.72 CM2
NEUTROPHILS # BLD AUTO: 3.5 K/UL (ref 1.8–7.7)
NEUTROPHILS NFR BLD: 78.5 % (ref 38–73)
NRBC BLD-RTO: 0 /100 WBC
PHOSPHATE SERPL-MCNC: 1.8 MG/DL (ref 2.7–4.5)
PISA MRMAX VEL: 4.8 M/S
PISA TR MAX VEL: 2.43 M/S
PLATELET # BLD AUTO: 52 K/UL (ref 150–450)
PMV BLD AUTO: 10.8 FL (ref 9.2–12.9)
POTASSIUM SERPL-SCNC: 3.3 MMOL/L (ref 3.5–5.1)
PROT SERPL-MCNC: 5.1 G/DL (ref 6–8.4)
RA MAJOR: 4.95 CM
RA PRESSURE ESTIMATED: 3 MMHG
RBC # BLD AUTO: 2.28 M/UL (ref 4–5.4)
RIGHT VENTRICULAR END-DIASTOLIC DIMENSION: 2.38 CM
RV TB RVSP: 5 MMHG
RV TISSUE DOPPLER FREE WALL SYSTOLIC VELOCITY 1 (APICAL 4 CHAMBER VIEW): 24.53 CM/S
SODIUM SERPL-SCNC: 134 MMOL/L (ref 136–145)
TDI LATERAL: 0.2 M/S
TDI SEPTAL: 0.15 M/S
TDI: 0.18 M/S
TR MAX PG: 24 MMHG
TRICUSPID ANNULAR PLANE SYSTOLIC EXCURSION: 3.49 CM
TV REST PULMONARY ARTERY PRESSURE: 27 MMHG
WBC # BLD AUTO: 4.46 K/UL (ref 3.9–12.7)
Z-SCORE OF LEFT VENTRICULAR DIMENSION IN END DIASTOLE: 0.88
Z-SCORE OF LEFT VENTRICULAR DIMENSION IN END SYSTOLE: 0.23

## 2023-08-16 PROCEDURE — 99232 SBSQ HOSP IP/OBS MODERATE 35: CPT | Mod: ,,, | Performed by: INTERNAL MEDICINE

## 2023-08-16 PROCEDURE — 97110 THERAPEUTIC EXERCISES: CPT

## 2023-08-16 PROCEDURE — 97530 THERAPEUTIC ACTIVITIES: CPT

## 2023-08-16 PROCEDURE — 99232 PR SUBSEQUENT HOSPITAL CARE,LEVL II: ICD-10-PCS | Mod: ,,, | Performed by: INTERNAL MEDICINE

## 2023-08-16 PROCEDURE — 25000003 PHARM REV CODE 250: Performed by: FAMILY MEDICINE

## 2023-08-16 PROCEDURE — 80053 COMPREHEN METABOLIC PANEL: CPT | Performed by: FAMILY MEDICINE

## 2023-08-16 PROCEDURE — 97165 OT EVAL LOW COMPLEX 30 MIN: CPT

## 2023-08-16 PROCEDURE — 83735 ASSAY OF MAGNESIUM: CPT | Performed by: FAMILY MEDICINE

## 2023-08-16 PROCEDURE — C9113 INJ PANTOPRAZOLE SODIUM, VIA: HCPCS | Performed by: FAMILY MEDICINE

## 2023-08-16 PROCEDURE — 11000001 HC ACUTE MED/SURG PRIVATE ROOM

## 2023-08-16 PROCEDURE — 94799 UNLISTED PULMONARY SVC/PX: CPT

## 2023-08-16 PROCEDURE — 84100 ASSAY OF PHOSPHORUS: CPT | Performed by: FAMILY MEDICINE

## 2023-08-16 PROCEDURE — 25000003 PHARM REV CODE 250: Performed by: INTERNAL MEDICINE

## 2023-08-16 PROCEDURE — 97161 PT EVAL LOW COMPLEX 20 MIN: CPT

## 2023-08-16 PROCEDURE — A4216 STERILE WATER/SALINE, 10 ML: HCPCS | Performed by: FAMILY MEDICINE

## 2023-08-16 PROCEDURE — 63600175 PHARM REV CODE 636 W HCPCS: Performed by: FAMILY MEDICINE

## 2023-08-16 PROCEDURE — 36415 COLL VENOUS BLD VENIPUNCTURE: CPT | Performed by: FAMILY MEDICINE

## 2023-08-16 PROCEDURE — 94761 N-INVAS EAR/PLS OXIMETRY MLT: CPT

## 2023-08-16 PROCEDURE — 99900035 HC TECH TIME PER 15 MIN (STAT)

## 2023-08-16 PROCEDURE — 85025 COMPLETE CBC W/AUTO DIFF WBC: CPT | Performed by: FAMILY MEDICINE

## 2023-08-16 RX ADMIN — LEVETIRACETAM 1000 MG: 500 SOLUTION ORAL at 08:08

## 2023-08-16 RX ADMIN — LEVETIRACETAM 1000 MG: 500 SOLUTION ORAL at 11:08

## 2023-08-16 RX ADMIN — DOCUSATE SODIUM AND SENNOSIDES 1 TABLET: 8.6; 5 TABLET, FILM COATED ORAL at 08:08

## 2023-08-16 RX ADMIN — PANTOPRAZOLE SODIUM 40 MG: 40 INJECTION, POWDER, LYOPHILIZED, FOR SOLUTION INTRAVENOUS at 11:08

## 2023-08-16 RX ADMIN — RIFAXIMIN 550 MG: 550 TABLET ORAL at 08:08

## 2023-08-16 RX ADMIN — LACTULOSE 30 G: 20 SOLUTION ORAL at 11:08

## 2023-08-16 RX ADMIN — DOCUSATE SODIUM AND SENNOSIDES 1 TABLET: 8.6; 5 TABLET, FILM COATED ORAL at 11:08

## 2023-08-16 RX ADMIN — POTASSIUM PHOSPHATE, MONOBASIC AND POTASSIUM PHOSPHATE, DIBASIC 20 MMOL: 224; 236 INJECTION, SOLUTION, CONCENTRATE INTRAVENOUS at 03:08

## 2023-08-16 RX ADMIN — Medication 10 ML: at 08:08

## 2023-08-16 RX ADMIN — POLYETHYLENE GLYCOL 3350 17 G: 17 POWDER, FOR SOLUTION ORAL at 11:08

## 2023-08-16 RX ADMIN — LITHIUM CARBONATE 300 MG: 150 CAPSULE, GELATIN COATED ORAL at 08:08

## 2023-08-16 RX ADMIN — PANTOPRAZOLE SODIUM 40 MG: 40 INJECTION, POWDER, LYOPHILIZED, FOR SOLUTION INTRAVENOUS at 08:08

## 2023-08-16 RX ADMIN — Medication 10 ML: at 02:08

## 2023-08-16 RX ADMIN — RIFAXIMIN 550 MG: 550 TABLET ORAL at 11:08

## 2023-08-16 NOTE — PT/OT/SLP EVAL
Occupational Therapy   Evaluation    Name: Marely Hamilton  MRN: [id number]  Admitting Diagnosis: Severe sepsis  Recent Surgery: * No surgery found *      Recommendations:     Discharge Recommendations: other (see comments) (moderate intensity therapy)  Discharge Equipment Recommendations:  to be determined by next level of care  Barriers to discharge:  Other (Comment) (Pt requires increased level of assistance)    Assessment:     Marely Hamilton is a 58 y.o. female with a medical diagnosis of Severe sepsis.  She presents with The primary encounter diagnosis was SBP (spontaneous bacterial peritonitis). Diagnoses of Confusion, Pulmonary edema, Sepsis, Acute encephalopathy, Liver disease, chronic, with cirrhosis, Thrombocytopenia, Cirrhosis, Laennec's, Hyperammonemia, Coagulopathy, Hepatic encephalopathy, Anemia, unspecified type, Leukopenia, unspecified type, Hypoalbuminemia, Chest pain, Gram-negative bacteremia [R78.81], Other ascites [R18.8], Acute cystitis without hematuria [N30.00], and Severe sepsis were also pertinent to this visit. Performance deficits affecting function: weakness, impaired functional mobility, impaired cognition, decreased safety awareness, decreased coordination, decreased upper extremity function, decreased lower extremity function, impaired self care skills, impaired balance, impaired endurance, gait instability, pain, impaired skin, decreased ROM, edema, impaired coordination.      Pt would benefit from cont OT services in order to maximize functional independence. Recommending moderate intensity therapy upon d/c. Pt agreeable to sitting EOB this date. EOB O2 92%, , /57. Pt Aox3. C/o pain to buttocks; spoke with nsg about recommendation for waffle overlay on mattress. Will progress as able.     Rehab Prognosis: Fair; patient would benefit from acute skilled OT services to address these deficits and reach maximum level of function.       Plan:     Patient to be seen 3 x/week to  address the above listed problems via therapeutic activities, therapeutic exercises, self-care/home management  Plan of Care Expires: 23  Plan of Care Reviewed with: patient    Subjective     Chief Complaint: Pain in buttocks  Patient/Family Comments/goals: Pt agreeable to sit EOB    Occupational Profile:  Patient admitted to hospital from SNF at [facility]  Prior to admission, patients level of function was assisted with bed mobility and transfers with min/modA, has not been able to walk as yet; assisted with bathing, dressing; able to feed self and groom.  Equipment used at home: wheelchair.  DME per SNF  Upon discharge, patient will have assistance from NH staff     .    Pain/Comfort:  Pain Rating 1: 3/10  Location 1:  (buttocks)  Pain Addressed 1: Reposition, Cessation of Activity, Distraction, Nurse notified    Patients cultural, spiritual, Jewish conflicts given the current situation: no    Objective:     Communicated with: nsg prior to session.  Patient found HOB elevated with bed alarm, baugh catheter, telemetry upon OT entry to room.    General Precautions: Standard, fall  Orthopedic Precautions: N/A  Braces: N/A  Respiratory Status: Room air    Occupational Performance:    Bed Mobility:    Patient completed Scooting/Bridging with maximal assistance and 2 persons laterally along EOB with use of draw sheet  Patient completed Supine to Sit with moderate assistance and 2 persons  Patient completed Sit to Supine with moderate assistance and 2 persons    Functional Mobility/Transfers:  NT pt requesting to return to supine    Activities of Daily Living:  Grooming: stand by assistance seated EOB to wash face    Cognitive/Visual Perceptual:  Cognitive/Psychosocial Skills:     -       Oriented to: Name, , hospital, year & month, confused to reason for being in hospital   -       Follows Commands/attention:Follows one-step commands and Follows two-step commands  -       Safety  awareness/insight to disability: impaired   -       Mood/Affect/Coping skills/emotional control: Cooperative    Physical Exam:  BUE decreased shoulder flex noted, grossly 3/5 BUE strength    AMPAC 6 Click ADL:  AMPA Total Score: 15    Treatment & Education:  Pt would benefit from cont OT services in order to maximize functional independence.   Pt agreeable to sitting EOB this date. EOB O2 92%, , /57. Pt Aox3.   C/o pain to buttocks; spoke with nsg about recommendation for waffle overlay on mattress.   Will progress as able.     Patient left HOB elevated with all lines intact, call button in reach, bed alarm on, and nsg notified    GOALS:   Multidisciplinary Problems       Occupational Therapy Goals          Problem: Occupational Therapy    Goal Priority Disciplines Outcome Interventions   Occupational Therapy Goal     OT, PT/OT Ongoing, Progressing    Description: Goals to be met by: 9/16/2023     Patient will increase functional independence with ADLs by performing:    UE Dressing with Set-up Assistance.  Grooming while seated with Set-up Assistance.  Toileting from bedside commode with Minimal Assistance for hygiene and clothing management.   Supine to sit with Minimal Assistance.  Step transfer with Moderate Assistance & appropriate AD.  Toilet transfer to bedside commode with Moderate Assistance & appropriate AD.                         History:     Past Medical History:   Diagnosis Date    Addiction to drug     Alcohol abuse     Alcohol abuse, in remission 6/15/2015    14.5 weeks ago; AA weekly    Anemia     Anxiety 6/15/2015    Behavioral problem     Bipolar disorder     Bipolar disorder in remission 6/15/2015    Cirrhosis, Laennec's 6/15/2015    Depression     Encounter for blood transfusion     Epistaxis 6/15/2015    Fatigue     Glaucoma     Hematuria     Hepatic encephalopathy 6/15/2015    Hepatic enlargement     History of psychiatric care     History of psychiatric hospitalization     History  of seizure 6/15/2015    1    hx of intentional Tylenol overdose 6/15/2015    2005- situational and hx of bipolar    Hx of psychiatric care     Macrocytic anemia 9/18/2015    6 units PRBC since June 2015    Jeana     Osteoarthritis     Other ascites 6/15/2015    Psychiatric exam requested by authority     Psychiatric problem     Psychosis 9/26/2019    Renal disorder     Seizures     Self-harming behavior     Suicide attempt     Therapy     Thrombocytopenia 6/15/2015         Past Surgical History:   Procedure Laterality Date    COSMETIC SURGERY      EMBOLIZATION N/A 6/11/2023    Procedure: EMBOLIZATION, BLOOD VESSEL;  Surgeon: Debbie Surgeon;  Location: [facility];  Service: Anesthesiology;  Laterality: N/A;    ESOPHAGOGASTRODUODENOSCOPY      ESOPHAGOGASTRODUODENOSCOPY N/A 7/6/2023    Procedure: EGD (ESOPHAGOGASTRODUODENOSCOPY);  Surgeon: Bernice Guillen MD;  Location: [facility] ([address] [place] [address]);  Service: Endoscopy;  Laterality: N/A;    ESOPHAGOGASTRODUODENOSCOPY N/A 7/11/2023    Procedure: EGD (ESOPHAGOGASTRODUODENOSCOPY);  Surgeon: Rangel Broussard MD;  Location: [address]);  Service: Endoscopy;  Laterality: N/A;       Time Tracking:     OT Date of Treatment: 08/16/23  OT Start Time: 1025  OT Stop Time: 1056  OT Total Time (min): 31 min with PT    Billable Minutes:Evaluation 8  Therapeutic Activity 23 8/16/2023

## 2023-08-16 NOTE — PLAN OF CARE
Pt would benefit from cont OT services in order to maximize functional independence. Recommending moderate intensity therapy upon d/c. Pt agreeable to sitting EOB this date. EOB O2 92%, , /57. Pt Aox3. C/o pain to buttocks; spoke with nsg about recommendation for waffle overlay on mattress. Will progress as able.     Problem: Occupational Therapy  Goal: Occupational Therapy Goal  Description: Goals to be met by: 9/16/2023     Patient will increase functional independence with ADLs by performing:    UE Dressing with Set-up Assistance.  Grooming while seated with Set-up Assistance.  Toileting from bedside commode with Minimal Assistance for hygiene and clothing management.   Supine to sit with Minimal Assistance.  Step transfer with Moderate Assistance & appropriate AD.  Toilet transfer to bedside commode with Moderate Assistance & appropriate AD.    Outcome: Ongoing, Progressing

## 2023-08-16 NOTE — PLAN OF CARE
0800  Updated notes sent to [facility] via [employer]. Awaiting response.     0930  Patient resting quietly in bed when CM participated in SIBR with Dr Stringer, MARINA Coulter & nurse Pam. Patient was admitted with spontaneous bacterial peritonitis & continues to be followed by GI, ID, dy, & PT/OT. Pt scheduled to have a renal US done today. Cx results pending.       Will continue to follow.

## 2023-08-16 NOTE — ASSESSMENT & PLAN NOTE
Patient with hx of decompensated alcoholic cirrhosis, has followed [facility] and [facility] in the past. Per chart review, denied liver transplant in 2015 due to malnutrition. Per note in July 2023, will corodinate re-evaluation for lvier tranpslant in outaptient setting. No hx of paracentesis. No signs of active GI bleeding at this time. MELD 22. Child Raymundo class C. Went for IR para yesterday. No pocket found.     Plan:  - Continue w/u ongoing fever, SBP less likely   - Continue home lactulose, titrate to 3 bowel movements per day  - Followup with outpatient [facility] upon discharge

## 2023-08-16 NOTE — CONSULTS
[facility] - Telemetry  Infectious Disease  Consult Note    Patient Name: Marely Hamilton  MRN: [id number]  Admission Date: 8/14/2023  Hospital Length of Stay: 2 days  Attending Physician: Franci Stringer MD  Primary Care Provider: Viktor Ross MD     Isolation Status: No active isolations    Patient information was obtained from patient, past medical records, ER records and primary team.      Inpatient consult to Infectious Diseases  Consult performed by: Sukh Giraldo MD  Consult ordered by: Franci Stringer MD  Reason for consult: GNR bacteremia with UTI        Assessment/Plan:     Neuro  AMS (altered mental status)  See GNR bacteremia    Pulmonary  Pneumonia  See GNR bacteremia    Renal/  UTI (urinary tract infection)  Urine cx with Ecoli  Sensitive to all except cipro and levo follow blood cx Ecoli    ID  Gram-negative bacteremia  57 y/o F with a PMH of anemia,  alcohol abuse with advanced liver disease with alcoholic cirrhosis (was being considered a candidate for LT at one point 2015), bipolar disorder depression, history of seizure, osteoarthritis, thrombocytopenia brought in by EMS from [facility] on 8/14/23 with complaints of worsening confusion worse than baseline with abdominal pain, chills and then 101.4 fever when she arrived in the ED.   Na 132, K 3.5, Bun/Cr 8/0.5, , wbc 3.21, H/H 9.0/28, PLT 58, INR 1.9, Lactate elevated 2.4, Ca 7.7, Alb 1.9, CXR Nonspecific bibasilar opacities could relate to atelectasis, aspiration or pneumonia. UA with pyuria with WBC above 80 in UA   Pt was admitted for possible PNA, UTI and SBP. GI and IR consulted. IR attempted paracentesis 8/15/23 but not enough ascitic fluid. b/l LE venous doppler with chronic DVT  Later Ucx with ecoli R to cipro and levo and blood cx 4/4 bottles 8/14/23 with GNR/Ecoli per BCID  Recommendations:  -Continue ceftriaxone  -Follow blood cx  -Would recommend renal US  Will follow    GI  Ascites  See bacteremia.      Thank you for  your consult. I will follow-up with patient. Please contact us if you have any additional questions.    Sukh Giraldo MD  Infectious Diseases  [place] - Telemetry    Subjective:     Principal Problem: SBP (spontaneous bacterial peritonitis)    HPI:  59 y/o F with a PMH of anemia,  alcohol abuse with advanced liver disease with alcoholic cirrhosis (was being considered a candidate for LT at one point 2015), bipolar disorder depression, history of seizure, osteoarthritis, thrombocytopenia brought in by EMS from [facility] on 8/14/23 with complaints of worsening confusion worse than baseline with abdominal pain, chills and then 101.4 fever when she arrived in the ED.   Na 132, K 3.5, Bun/Cr 8/0.5, , wbc 3.21, H/H 9.0/28, PLT 58, INR 1.9, Lactate elevated 2.4, Ca 7.7, Alb 1.9, CXR Nonspecific bibasilar opacities could relate to atelectasis, aspiration or pneumonia. UA with pyuria with WBC above 80 in UA   Pt was admitted for possible PNA, UTI and SBP. GI and IR consulted. IR attempted paracentesis 8/15/23 but not enough ascitic fluid, b/l LE venous doppler with chronic DVT  Later Ucx with ecoli and blood cx 4/4 bottles 8/14/23 with GNR      Past Medical History:   Diagnosis Date    Addiction to drug     Alcohol abuse     Alcohol abuse, in remission 6/15/2015    14.5 weeks ago; AA weekly    Anemia     Anxiety 6/15/2015    Behavioral problem     Bipolar disorder     Bipolar disorder in remission 6/15/2015    Cirrhosis, Laennec's 6/15/2015    Depression     Encounter for blood transfusion     Epistaxis 6/15/2015    Fatigue     Glaucoma     Hematuria     Hepatic encephalopathy 6/15/2015    Hepatic enlargement     History of psychiatric care     History of psychiatric hospitalization     History of seizure 6/15/2015    1    hx of intentional Tylenol overdose 6/15/2015    2005- situational and hx of bipolar    Hx of psychiatric care     Macrocytic anemia 9/18/2015    6 units PRBC since June  2015    Jeana     Osteoarthritis     Other ascites 6/15/2015    Psychiatric exam requested by authority     Psychiatric problem     Psychosis 9/26/2019    Renal disorder     Seizures     Self-harming behavior     Suicide attempt     Therapy     Thrombocytopenia 6/15/2015       Past Surgical History:   Procedure Laterality Date    COSMETIC SURGERY      EMBOLIZATION N/A 6/11/2023    Procedure: EMBOLIZATION, BLOOD VESSEL;  Surgeon: Debbie Surgeon;  Location: [facility];  Service: Anesthesiology;  Laterality: N/A;    ESOPHAGOGASTRODUODENOSCOPY      ESOPHAGOGASTRODUODENOSCOPY N/A 7/6/2023    Procedure: EGD (ESOPHAGOGASTRODUODENOSCOPY);  Surgeon: Bernice Guillen MD;  Location: [facility] (2ND FLR);  Service: Endoscopy;  Laterality: N/A;    ESOPHAGOGASTRODUODENOSCOPY N/A 7/11/2023    Procedure: EGD (ESOPHAGOGASTRODUODENOSCOPY);  Surgeon: Rangel Broussard MD;  [facility]  Service: Endoscopy;  Laterality: N/A;       Review of patient's allergies indicates:   Allergen Reactions    Sulfa (sulfonamide antibiotics) Rash    Codeine Nausea And Vomiting       Medications:  Medications Prior to Admission   Medication Sig    ferrous sulfate (FEOSOL) 325 mg (65 mg iron) Tab tablet Take 325 mg by mouth once daily.    lactulose (CHRONULAC) 20 gram/30 mL Soln Take 45 mLs (30 g total) by mouth 3 (three) times daily.    levetiracetam 500 mg/5 mL (5 mL) Soln Take 10 mLs (1,000 mg total) by mouth 2 (two) times daily.    lithium (LITHOBID) 300 MG CR tablet Take 1 tablet (300 mg total) by mouth every evening.    OLANZapine (ZYPREXA) 5 MG tablet Take 1 tablet (5 mg total) by mouth every evening.    pantoprazole (PROTONIX) 40 mg suspension Take 1 packet (40 mg total) by mouth 2 (two) times daily.    rifAXIMin (XIFAXAN) 550 mg Tab Take 1 tablet (550 mg total) by mouth 2 (two) times daily.     Antibiotics (From admission, onward)      Start     Stop Route Frequency Ordered    08/15/23 1830  cefTRIAXone  (ROCEPHIN) 2 g in dextrose 5 % in water (D5W) 100 mL IVPB (MB+)         -- IV Every 24 hours (non-standard times) 08/15/23 1435    08/14/23 2100  rifAXIMin tablet 550 mg         -- Oral 2 times daily 08/14/23 1827          Antifungals (From admission, onward)      None          Antivirals (From admission, onward)      None             Immunization History   Administered Date(s) Administered    COVID-19, MRNA, LN-S, PF (MODERNA FULL 0.5 ML DOSE) 04/08/2021, 05/06/2021, 01/26/2022    PPD Test 09/20/2019, 07/21/2023       Family History       Problem Relation (Age of Onset)    Alcohol abuse Father, Sister, Paternal Grandfather    Bipolar disorder Father    Hypertension Mother    Suicide Father          Social History     Socioeconomic History    Marital status: Single   Occupational History    Occupation: Disabled     Employer: DISABLED   Tobacco Use    Smoking status: Never    Smokeless tobacco: Never   Substance and Sexual Activity    Alcohol use: Not Currently     Comment: hx of ETOH abuse with cirrhosis of liver    Drug use: No    Sexual activity: Not Currently   Other Topics Concern    Patient feels they ought to cut down on drinking/drug use No    Patient annoyed by others criticizing their drinking/drug use No    Patient has felt bad or guilty about drinking/drug use No    Patient has had a drink/used drugs as an eye opener in the AM No   Social History Narrative    Pt has 1 older and 1 younger sister.  Her father committed suicide when she was 18.  She has a [employer] degree and worked as an , but she has been disabled since age 40.  She never  and has no children.  She is not dating and claims no current friends.  She currently lives with her mother along with her pet dog.  She denies any hobbies and says she is not Anabaptism.     Social Determinants of Health     Financial Resource Strain: Low Risk  (8/15/2023)    Overall Financial Resource Strain (CARDIA)     Difficulty of Paying Living  Expenses: Not hard at all   Food Insecurity: No Food Insecurity (8/15/2023)    Hunger Vital Sign     Worried About Running Out of Food in the Last Year: Never true     Ran Out of Food in the Last Year: Never true   Transportation Needs: No Transportation Needs (8/15/2023)    PRAPARE - Transportation     Lack of Transportation (Medical): No     Lack of Transportation (Non-Medical): No   Physical Activity: Inactive (8/15/2023)    Exercise Vital Sign     Days of Exercise per Week: 0 days     Minutes of Exercise per Session: 0 min   Stress: Stress Concern Present (8/15/2023)    [employer] - Occupational Stress Questionnaire     Feeling of Stress : Very much   Social Connections: Unknown (8/15/2023)    Social Connection and Isolation Panel [NHANES]     Frequency of Communication with Friends and Family: Patient refused     Frequency of Social Gatherings with Friends and Family: Patient refused     Attends Anabaptist Services: Never     Active Member of Clubs or Organizations: No     Attends Club or Organization Meetings: Never     Marital Status: Never    Housing Stability: Low Risk  (8/15/2023)    Housing Stability Vital Sign     Unable to Pay for Housing in the Last Year: No     Number of Places Lived in the Last Year: 1     Unstable Housing in the Last Year: No     Review of Systems   Constitutional:  Positive for activity change, appetite change and fatigue. Negative for chills.   HENT:  Negative for congestion, mouth sores and nosebleeds.    Eyes:  Negative for discharge and itching.   Respiratory:  Negative for apnea and chest tightness.    Cardiovascular:  Negative for chest pain.   Gastrointestinal:  Positive for abdominal distention, abdominal pain and nausea. Negative for diarrhea and vomiting.   Genitourinary:  Positive for dysuria. Negative for difficulty urinating.   Musculoskeletal:  Negative for arthralgias and joint swelling.   Skin:  Negative for wound.    Neurological:  Negative for dizziness.   Psychiatric/Behavioral:  Negative for agitation and behavioral problems.    All other systems reviewed and are negative.    Objective:     Vital Signs (Most Recent):  Temp: 97 °F (36.1 °C) (08/16/23 1135)  Pulse: 85 (08/16/23 1204)  Resp: 18 (08/16/23 1135)  BP: (!) 116/56 (08/16/23 1135)  SpO2: 96 % (08/16/23 1135) Vital Signs (24h Range):  Temp:  [96.3 °F (35.7 °C)-100.3 °F (37.9 °C)] 97 °F (36.1 °C)  Pulse:  [79-93] 85  Resp:  [16-19] 18  SpO2:  [92 %-96 %] 96 %  BP: (106-130)/(55-62) 116/56     Weight: 52.2 kg (115 lb 1.3 oz)  Body mass index is 21.05 kg/m².    Estimated Creatinine Clearance: 81.8 mL/min (based on SCr of 0.6 mg/dL).     Physical Exam  Vitals and nursing note reviewed.   Constitutional:       General: She is not in acute distress.     Appearance: She is ill-appearing. She is not toxic-appearing.      Comments: Thin lean with temporal wasting, pale looking   HENT:      Head: Atraumatic.      Nose: Nose normal. No rhinorrhea.      Mouth/Throat:      Mouth: Mucous membranes are moist.      Pharynx: No posterior oropharyngeal erythema.   Eyes:      General:         Right eye: No discharge.         Left eye: No discharge.      Extraocular Movements: Extraocular movements intact.   Cardiovascular:      Rate and Rhythm: Normal rate and regular rhythm.      Heart sounds: Murmur heard.   Pulmonary:      Effort: Pulmonary effort is normal. No respiratory distress.      Breath sounds: Normal breath sounds. No wheezing.   Abdominal:      General: There is distension.      Palpations: Abdomen is soft.      Tenderness: There is no abdominal tenderness.   Musculoskeletal:         General: Swelling present. No tenderness.      Cervical back: Normal range of motion and neck supple.      Right lower leg: Edema present.      Left lower leg: Edema present.   Skin:     General: Skin is warm.      Findings: No erythema or rash.   Neurological:      General: No focal deficit  present.      Mental Status: She is alert and oriented to person, place, and time.   Psychiatric:         Mood and Affect: Mood normal.         Behavior: Behavior normal.          Significant Labs: CBC:   Recent Labs   Lab 08/15/23  0450 08/16/23  0447   WBC 3.87* 4.46   HGB 8.1* 7.9*   HCT 26.3* 24.8*   PLT 55* 52*     CMP:   Recent Labs   Lab 08/15/23  0450 08/16/23  0447   * 134*   K 3.6 3.3*    111*   CO2 14* 15*   * 144*   BUN 9 7   CREATININE 0.5 0.6   CALCIUM 7.5* 7.4*   PROT 5.1* 5.1*   ALBUMIN 1.7* 1.7*   BILITOT 3.9* 2.5*   ALKPHOS 87 117   AST 25 33   ALT 13 12   ANIONGAP 10 8     Microbiology Results (last 7 days)       Procedure Component Value Units Date/Time    Blood culture x two cultures. Draw prior to antibiotics. [[id number]]  (Abnormal) Collected: 08/14/23 1019    Order Status: Completed Specimen: Blood Updated: 08/16/23 1232     Blood Culture, Routine Gram stain aer bottle: Gram negative rods      Gram stain enoc bottle: Gram negative rods      Results called to and read back by:Marsha Messina Rn 08/15/2023 00:50      ESCHERICHIA COLI  For susceptibility see order #[id number]      Narrative:      Aerobic and anaerobic    Blood culture x two cultures. Draw prior to antibiotics. [[id number]]  (Abnormal) Collected: 08/14/23 0954    Order Status: Completed Specimen: Blood from Peripheral, Jugular, External Left Updated: 08/16/23 1232     Blood Culture, Routine Gram stain aer bottle: Gram negative rods      Gram stain enoc bottle: Gram negative rods      Positive results previously called 08/15/2023      ESCHERICHIA COLI  Susceptibility pending      Narrative:      Aerobic and anaerobic    Urine culture [[id number]]  (Abnormal)  (Susceptibility) Collected: 08/14/23 0846    Order Status: Completed Specimen: Urine Updated: 08/16/23 0956     Urine Culture, Routine ESCHERICHIA COLI  >100,000 cfu/ml      Narrative:      Specimen Source->Urine    Rapid Organism ID by PCR (from Blood  culture) [[id number]]  (Abnormal) Collected: 08/14/23 1019    Order Status: Completed Updated: 08/15/23 0117     Enterococcus faecalis Not Detected     Enterococcus faecium Not Detected     Listeria monocytogenes Not Detected     Staphylococcus spp. Not Detected     Staphylococcus aureus Not Detected     Staphylococcus epidermidis Not Detected     Staphylococcus lugdunensis Not Detected     Streptococcus species Not Detected     Streptococcus agalactiae Not Detected     Streptococcus pneumoniae Not Detected     Streptococcus pyogenes Not Detected     Acinetobacter calcoaceticus/baumannii complex Not Detected     Bacteroides fragilis Not Detected     Enterobacterales See species for ID     Enterobacter cloacae complex Not Detected     Escherichia coli Detected     Klebsiella aerogenes Not Detected     Klebsiella oxytoca Not Detected     Klebsiella pneumoniae group Not Detected     Proteus Not Detected     Salmonella sp Not Detected     Serratia marcescens Not Detected     Haemophilus influenzae Not Detected     Neisseria meningtidis Not Detected     Pseudomonas aeruginosa Not Detected     Stenotrophomonas maltophilia Not Detected     Candida albicans Not Detected     Candida auris Not Detected     Candida glabrata Not Detected     Candida krusei Not Detected     Candida parapsilosis Not Detected     Candida tropicalis Not Detected     Cryptococcus neoformans/gattii Not Detected     CTX-M (ESBL ) Not Detected     IMP (Carbapenem resistant) Not Detected     KPC resistance gene (Carbapenem resistant) Not Detected     mcr-1  Not Detected     mec A/C  Not Detected     mec A/C and MREJ (MRSA) gene Not Detected     NDM (Carbapenem resistant) Not Detected     OXA-48-like (Carbapenem resistant) Not Detected     van A/B (VRE gene) Not Detected     VIM (Carbapenem resistant) Not Detected    Narrative:      Aerobic and anaerobic    Culture, Body Fluid - Bactec [[id number]]     Order Status: No result Specimen: Body Fluid  from Peritoneal Fluid     Gram stain [[id number]]     Order Status: No result Specimen: Body Fluid from Peritoneal Fluid     Influenza A & B by Molecular [[id number]] Collected: 08/14/23 0849    Order Status: Completed Specimen: Nasopharyngeal Swab Updated: 08/14/23 0932     Influenza A, Molecular Negative     Influenza B, Molecular Negative     Flu A & B Source Nasal swab          All pertinent labs within the past 24 hours have been reviewed.    Significant Imaging: I have reviewed all pertinent imaging results/findings within the past 24 hours.

## 2023-08-16 NOTE — SUBJECTIVE & OBJECTIVE
Interval History:  Patient awake alert and oriented in no acute distress.  Remains afebrile.  No complaints from patient today.  Denies chest pain, shortness of breath, abdominal pain , nausea vomiting.  Patient seen and assessed along with  by bedside.  Plan of care discussed with patient    Review of Systems   Constitutional:  Negative for activity change, fatigue and fever.   Respiratory:  Negative for cough and shortness of breath.    Cardiovascular:  Negative for chest pain and leg swelling.   Gastrointestinal:  Negative for abdominal pain, nausea and vomiting.   All other systems reviewed and are negative.    Objective:     Vital Signs (Most Recent):  Temp: 97 °F (36.1 °C) (08/16/23 1135)  Pulse: 85 (08/16/23 1204)  Resp: 18 (08/16/23 1135)  BP: (!) 116/56 (08/16/23 1135)  SpO2: 96 % (08/16/23 1135) Vital Signs (24h Range):  Temp:  [96.3 °F (35.7 °C)-100.3 °F (37.9 °C)] 97 °F (36.1 °C)  Pulse:  [79-93] 85  Resp:  [16-19] 18  SpO2:  [92 %-96 %] 96 %  BP: (106-130)/(55-62) 116/56     Weight: 52.2 kg (115 lb 1.3 oz)  Body mass index is 21.05 kg/m².    Intake/Output Summary (Last 24 hours) at 8/16/2023 1300  Last data filed at 8/16/2023 0521  Gross per 24 hour   Intake --   Output 700 ml   Net -700 ml         Physical Exam  Constitutional:       General: She is not in acute distress.     Appearance: Normal appearance.   HENT:      Head: Normocephalic and atraumatic.      Mouth/Throat:      Mouth: Mucous membranes are moist.   Eyes:      Pupils: Pupils are equal, round, and reactive to light.   Cardiovascular:      Rate and Rhythm: Normal rate and regular rhythm.      Pulses: Normal pulses.      Heart sounds: Normal heart sounds.   Pulmonary:      Effort: Pulmonary effort is normal. No respiratory distress.      Breath sounds: Normal breath sounds.   Abdominal:      General: Bowel sounds are normal. There is distension.      Tenderness: There is no abdominal tenderness.   Musculoskeletal:      Right  lower leg: Edema present.      Left lower leg: Edema present.   Skin:     General: Skin is warm and dry.   Neurological:      General: No focal deficit present.      Mental Status: She is alert and oriented to person, place, and time.      Comments: Generalized weakness   Psychiatric:         Mood and Affect: Mood normal.             Significant Labs: All pertinent labs within the past 24 hours have been reviewed.    Significant Imaging: I have reviewed all pertinent imaging results/findings within the past 24 hours.

## 2023-08-16 NOTE — ASSESSMENT & PLAN NOTE
-Due to chronic liver disease  -monitor platelets= decrease  -consider transfusion of 1 unit platelets if platelet count less than 10,000

## 2023-08-16 NOTE — PLAN OF CARE
Problem: Physical Therapy  Goal: Physical Therapy Goal  Description: Goals to be met by: 2023     Patient will increase functional independence with mobility by performin. Supine to sit with MInimal Assistance  2. Sit to supine with MInimal Assistance  3. Sit to stand transfer with Moderate Assistance using Rolling Walker  4. Bed to chair transfer with Moderate Assistance using Rolling Walker  5. Gait  x 20 feet with Moderate Assistance using Rolling Walker.   6. Stand for 3 minutes with Minimal Assistance and Moderate Assistance using Rolling Walker  7. Lower extremity exercise program x10 reps with assistance as needed    Outcome: Ongoing, Progressing   Patient evaluated to patient tolerance with OT; pt A&O x 3; pt c/o pain to buttocks; pt able to sit EOB with SBA and O2 sats 92%  /57; spoke with nurse about obtaining waffle mattress; pt would benefit from cont PT services in order to maximize functional independence; recommend moderate intensity therapy upon discharge from hospital; will progress as tolerated.

## 2023-08-16 NOTE — PROGRESS NOTES
[facility]  Progress Note    Patient Name: Marely Hamilton  MRN: [id number]  Patient Class: IP- Inpatient   Admission Date: 8/14/2023  Length of Stay: 2 days  Attending Physician: Franci Stringer MD  Primary Care Provider: Viktor Ross MD        Subjective:     Principal Problem:Severe sepsis        HPI:  Marely Hamilton is a 57 y/o F with a PMH of anemia alcohol abuse, bipolar disorder depression, fatigue, history of seizure, osteoarthritis, thrombocytopenia brought in by EMS from [facility] with complaints of confusion with patient home O2 not in place, there is associated abdominal pain, chills and then 101.4 fever when she arrived in the ED.   Na 132, K 3.5, Bun/Cr 8/0.5, , wbc 3.21, H/H 9.0/28, PLT 58, INR 1.9, Lactate 2.4, Ca 7.7, Alb 1.9, CXR Nonspecific bibasilar opacities could relate to atelectasis, aspiration or pneumonia      Overview/Hospital Course:  No notes on file    Interval History:  Patient awake alert and oriented in no acute distress.  Remains afebrile.  No complaints from patient today.  Denies chest pain, shortness of breath, abdominal pain , nausea vomiting.  Patient seen and assessed along with  by bedside.  Plan of care discussed with patient    Review of Systems   Constitutional:  Negative for activity change, fatigue and fever.   Respiratory:  Negative for cough and shortness of breath.    Cardiovascular:  Negative for chest pain and leg swelling.   Gastrointestinal:  Negative for abdominal pain, nausea and vomiting.   All other systems reviewed and are negative.    Objective:     Vital Signs (Most Recent):  Temp: 97 °F (36.1 °C) (08/16/23 1135)  Pulse: 85 (08/16/23 1204)  Resp: 18 (08/16/23 1135)  BP: (!) 116/56 (08/16/23 1135)  SpO2: 96 % (08/16/23 1135) Vital Signs (24h Range):  Temp:  [96.3 °F (35.7 °C)-100.3 °F (37.9 °C)] 97 °F (36.1 °C)  Pulse:  [79-93] 85  Resp:  [16-19] 18  SpO2:  [92 %-96 %] 96 %  BP: (106-130)/(55-62) 116/56      Weight: 52.2 kg (115 lb 1.3 oz)  Body mass index is 21.05 kg/m².    Intake/Output Summary (Last 24 hours) at 8/16/2023 1300  Last data filed at 8/16/2023 0521  Gross per 24 hour   Intake --   Output 700 ml   Net -700 ml         Physical Exam  Constitutional:       General: She is not in acute distress.     Appearance: Normal appearance.   HENT:      Head: Normocephalic and atraumatic.      Mouth/Throat:      Mouth: Mucous membranes are moist.   Eyes:      Pupils: Pupils are equal, round, and reactive to light.   Cardiovascular:      Rate and Rhythm: Normal rate and regular rhythm.      Pulses: Normal pulses.      Heart sounds: Normal heart sounds.   Pulmonary:      Effort: Pulmonary effort is normal. No respiratory distress.      Breath sounds: Normal breath sounds.   Abdominal:      General: Bowel sounds are normal. There is distension.      Tenderness: There is no abdominal tenderness.   Musculoskeletal:      Right lower leg: Edema present.      Left lower leg: Edema present.   Skin:     General: Skin is warm and dry.   Neurological:      General: No focal deficit present.      Mental Status: She is alert and oriented to person, place, and time.      Comments: Generalized weakness   Psychiatric:         Mood and Affect: Mood normal.             Significant Labs: All pertinent labs within the past 24 hours have been reviewed.    Significant Imaging: I have reviewed all pertinent imaging results/findings within the past 24 hours.      Assessment/Plan:      * Severe sepsis  This patient does have evidence of infective focus  My overall impression is sepsis.  Source: Urinary Tract  Antibiotics given-   Antibiotics (72h ago, onward)    Start     Stop Route Frequency Ordered    08/15/23 1830  cefTRIAXone (ROCEPHIN) 2 g in dextrose 5 % in water (D5W) 100 mL IVPB (MB+)         -- IV Every 24 hours (non-standard times) 08/15/23 1435    08/14/23 2100  rifAXIMin tablet 550 mg         -- Oral 2 times daily 08/14/23 1827         Latest lactate reviewed-  Recent Labs   Lab 08/14/23  0913 08/15/23  1257   LACTATE 2.4* 3.1*     Organ dysfunction indicated by Encephalopathy and Thrombocytopenia     Will Not start Pressors- Levophed for MAP of 65  Source control achieved by:  Rocephin IV    -ID consulted= appreciate input and follow recommendations  -echocardiogram on 08/16/2023= pending    Gram-negative bacteremia        SBP (spontaneous bacterial peritonitis)  -unlikely SBP as no significant ascitic fluid seen  -SBP ruled out        UTI (urinary tract infection)  -afebrile   -no leukocytosis  -Lactate on admit= 2.4  -monitor lactic acidosis  -Blood cx on 08/14/2023= E coli.  Sensitive to Rocephin.  -blood PCR on 08/14/2023= E coli and Enterobacterales  -Urine culture on 08/14/2023= E coli.  Sensitive to Rocephin  -renal ultrasound per ID recommendations on 08/16/2023= pending  -Rocephin IV daily based on sensitivity to E colii.  Ordered on 08/14/2023  -ID consulted= appreciate input follow recommendations        Pneumonia  -O2 sats mid 90s on 2 L nasal cannula   - chest x-ray on 08/14/2023=Nonspecific bibasilar opacities could relate to atelectasis, aspiration or pneumonia. Interstitial coarsening appears mostly chronic, noting that a superimposed degree of acute pulmonary vascular congestion/interstitial edema is not excluded. question small right pleural effusion.  -continue antibiotics        Thrombocytopenia  -Due to chronic liver disease  -monitor platelets= decrease  -consider transfusion of 1 unit platelets if platelet count less than 10,000       Edema  -bilateral lower extremity edema likely due to cirrhosis           Ascites  -consult IR for diagnostic paracentesis  -discussed case with patient and patient's sister Zaria on 08/15/2023 and they are both agreeable for paracentesis  -abdominal paracentesis on 09/15/2023 by IR= attempted but no significant ascites seen per radiology  -ascitic fluid culture and analysis on  08/15/2023= no significant fluid collected      AMS (altered mental status)  -resolved.  Patient is awake alert orient x3 currently  -Due to cirrhosis and hyperammonemia  -Improved.  Patient is awake alert and oriented x3 currently  -ammonia level on 08/14/2023= 55 (elevated)  -Continue Lactulose p.o. t.i.d.  -continue rifaximin      Gastrointestinal hemorrhage associated with duodenal ulcer  Continue PPI      Acute blood loss anemia  -no overt bleeding seen or reported   -monitor H&H= stable range but with decrease  -FOB T= pending   -PPI bid  -consider transfusion 1 unit PRBC if hemoglobin less than 7  -GI consulted= appreciate input and follow recommendations      Cirrhosis, Laennec's  Alcohol cirrhosis  Continue Lactulose  Rifaximin   Monitor electrolyte      Chronic deep vein thrombosis (DVT) of right lower extremity  -bilateral lower extremity venous ultrasound on 08/15/2023= Chronic DVT of the right common femoral and saphenofemoral junction. Bilateral inguinal ascites  -IVC filter in place (patient had IVC filter placed on recent hospitalization )  -patient not a candidate for anticoagulation due to severe thrombocytopenia      Alcoholic cirrhosis        Acute encephalopathy  -resolved.  Patient is awake alert orient x3      Bipolar 1 disorder, depressed, severe  Bipolar disorder, manic  -stable   -On Zyprexa and Olanzapine- on hold due to confusion on admit  -Continue Lithium      History of seizure  -stable   -continue Keppra     VTE Risk Mitigation (From admission, onward)         Ordered     IP VTE HIGH RISK PATIENT  Once         08/14/23 1827     Place sequential compression device  Until discontinued         08/14/23 1827                Discharge Planning   BRAYAN: 8/18/2023     Code Status: Full Code   Is the patient medically ready for discharge?:     Reason for patient still in hospital (select all that apply): Patient trending condition, Treatment and Consult recommendations  Discharge Plan A: Skilled  Nursing Facility          Franci Stringer MD  Department of Hospital Medicine   [place] - Telemetry

## 2023-08-16 NOTE — DISCHARGE SUMMARY
[facility] - Tele[facility]  Discharge Summary      Patient Name: Marely Hamilton  MRN: [id number]  BUTCH: [id number]  Patient Class: IP- Inpatient  Admission Date: 7/5/2023  Hospital Length of Stay: 29 days  Discharge Date and Time: 8/3/2023  3:40 PM  Attending Physician: No att. providers found   Discharging Provider: Jermain Coates MD  Primary Care Provider: Viktor Ross MD  Hospital Medicine Team: [facility] HOSP MED R Jermain Coates MD  Primary Care Team: [facility] MED R    HPI:   Marely Hamilton is a 58 y.o. female with history of alcoholic cirrhosis, seizure disorder, DVT and IJ thrombus with recent admission for large retroperitoneal hemorrhage requiring IR embolization and IVC filter placement who presents for hematochezia. Onset this morning at [facility], alida blood per rectum per chart, sent to ED. Initially normotensive but then severe hypotension prompted initiation of levophed and intubation. Hgb 6.8, 2u pRBC given + 1u FFP ordered. Hgb 8.5 on 7/2. On levo and vaso currently. K 6.6 but renal function at baseline. Repeat pending. No prior EGDs on record.          Procedure(s) (LRB):  EGD (ESOPHAGOGASTRODUODENOSCOPY) (N/A)      Hospital Course:     Patient was seen at bedside, hematochezia still present post op by IR. Patient has required many transfusions of pRBCs and platelets due to ongoing down trending of Hgb. No clear source of ongoing hemorrhage by imaging. Patient has continued to require pressors to maintain MAP >65. Discussed with GI and IR regarding active bleeding post op, IR indicated there is not much else to do from their end bc they embolized a significant part of GDA. Pt is off of pressor requirement and not actively bleeding. GI re evaluated pt via EGD and found no sources of active bleeding. Pt is extubated and currently on BIPAP requirement due to de saturation in the 80's. Pt is to be seen by speech and PT/OT. Clear mucinous secretions via suction, chest physiotherapy, and  cough assist device. Nebs to help with breathing as well. Pt is off BIPAP and currently on comfort flow. Triple therapy (amoxicillin, clarithromycin) started for 14 days to treat for positive H pylori serology. Pt also had a NG tube placed so we may begin tube feedings. CxR, EKG, and ABG displayed no reason for tachycardia. Pt becomes anxious when seen by different provider teams. Remove art line and consult for midline placement. Continue to wean comfort flow as tolerated. Ensure pt is working with PT/OT/Speech for continuous improvement. Consult psych regarding patient history of bipolar disorder.         Interval History/Significant Events: Wean comfort flow as tolerated. Ensure patient is working with PT/OT/Speech for continuous improvement. Consult psych regarding psych history     7/16   history of alcoholic cirrhosis, seizure disorder, DVT and IJ thrombus with recent admission for large retroperitoneal hemorrhage requiring IR embolization and IVC filter placement who presents for hematochezia. s/p GI and IR in which they clipped (GI) and embolized (IR) possible sources of duodenal ulcer bleeding.  Hb stable  s/p extubation. sats 92% on 5L of NC.  CX ray - Detrimental changes since the previous examination including interval increase in pulmonary vascularity and bilateral diffuse interstitial pulmonary parenchymal opacification, progression of abnormal opacification at the left lung base, and development of small right-sided pleural effusion.   findings would be consistent with congestive heart failure.PT/OT recs SNF. BNP , procalcitonin and Echo.  SLP recs NPO .  On NGT feeds. psychiatry following for bipolar disorders.  Recs Zyprexa 5 mg QHS . Ammonia 48 WNL. AAOX 1. Hepatology consulted for cirrhosis. UA ordered   7/17 Saldaña removed. UA +. UC pending. started on IV ceftriaxone. ammonia at 59. on B/L UE mittens as pulled of NGT. Hepatology eval. resumed lactulose . discussed with sister and updated clinical  status   7/18  MBS today - started puree nectar thick liquids.  on oxygen 3L via NC.   wean  as tolerated . UC -YEAST 10,000 - 49,999 cfu/ml Identification pending No other significant isolate. hydroxyzine  PRN discontinued  due to it not being safe in delirium. oriented to person, hospital and year   7/19 SLP recs - tolerating Puree Diet - IDDSI Level 4, Mildly thick/Nectar thick liquids - IDDSI Level 2 .stool output 600ml/ monitor on lactulose . improved mentation AAOX 3  7/26 Await NH placement. Little improvement in functional status. Tunneled fistula seen near rectum. Low grade temp.  7/26 K replaced. Await NH placement. Little improvement in functional status. Tunneled fistula seen near rectum. Low grade temp. Consulted psych for recs concerning discharge.   7/27 Sister wanted her started back on lithium but psychiatry recommended  increasing Zyprexa 7.5mg QHS. EKG to monitor QtcDiscontinue zyprexa if Qtc >480. EKG QTC 493ms.  Zyprexa resumed at  zyprexa 5 nightly per psychiatry . Urinary retention this AM s/p straight cath   7/28 continues with confusion. UA + . UC pending. continue ceftriaxone. no fistula found on scaral exam with wound care.  Repeat EKG today to monitor Qtc. Saldaña catheter placed for urinary retention .  started  lithium 300 mg  nightly due to concern for manic episode  7/29 SLP recs - Mechanical soft, Thin . UC - GRAM NEGATIVE BILLY 10,000 - 49,999 cfu/ml  Identification and susceptibility pending  .ammonia elevated at 70. lactulose increased to 30g TID .started rifaximin BID   7/30 BMX 1 documented . pending mental status improvement   7/31 UC with klebsialla penumoniae and Aerogenes resisant to ceftriaxone. started on ciprofloxacin . sodium bicarbonate for bicarb of 16   8/1 K replaced. ammonia trending down . QTc  458ms . improved mentation today. oriented to date and month   8/2 Improving mentation . 3 BMs yesterday     Will dc to NH. Overall prognosis discussed with sister.          Goals of Care Treatment Preferences:  Code Status: Full Code      Consults:   Consults (From admission, onward)        Status Ordering Provider     Inpatient consult to Midline team  Once        Provider:  (Not yet assigned)    Completed AMISHA SHELLEY     Inpatient consult to Psychiatry  Once        Provider:  (Not yet assigned)    Completed JUVENTINO CASTILLO     Inpatient consult to Midline team  Once        Provider:  (Not yet assigned)    Completed HAYLEY SALDANA     Inpatient consult to Hepatology  Once        Provider:  (Not yet assigned)    Completed AMISHA SHELLEY     Inpatient consult to Psychiatry  Once        Provider:  (Not yet assigned)    Completed RODOLFO SAMEDIOR     Inpatient consult to Midline team  Once        Provider:  (Not yet assigned)    Completed BAILEYD, SAMER     Inpatient consult to Midline team  Once        Provider:  (Not yet assigned)    Completed KUSH RENYOLDS     Inpatient consult to Interventional Radiology  Once        Provider:  (Not yet assigned)    Completed KUSH REYNOLDS     Inpatient consult to Registered Dietitian/Nutritionist  Once        Provider:  (Not yet assigned)    Completed KAREN CHACON     Inpatient consult to Gastroenterology  Once        Provider:  (Not yet assigned)    Completed KUSH REYNOLDS     Inpatient consult to Critical Care Medicine  Once        Provider:  (Not yet assigned)    Completed SUSAN HARP     Inpatient consult to Gastroenterology  Once        Provider:  (Not yet assigned)    Completed SUSAN HARP          No new Assessment & Plan notes have been filed under this hospital service since the last note was generated.  Service: Hospital Medicine    Final Active Diagnoses:    Diagnosis Date Noted POA    PRINCIPAL PROBLEM:  Gastrointestinal hemorrhage associated with duodenal ulcer [K26.4] 07/07/2023 Yes    Urinary retention [R33.9] 07/27/2023 Yes    Dysphagia [R13.10] 07/18/2023 Yes    Irritant contact dermatitis due to fecal,  urinary or dual incontinence [L24.A2] 07/18/2023 Yes    Hypoxia [R09.02] 07/16/2023 Yes    H. pylori infection [A04.8] 07/13/2023 Unknown    Hematochezia [K92.1] 07/05/2023 Yes    Acute blood loss anemia [D62] 06/30/2023 Yes    Retroperitoneal bleed [R58] 06/11/2023 Yes    Supratherapeutic INR [R79.1] 02/09/2023 Unknown    UTI (urinary tract infection) [N39.0] 11/03/2021 Unknown    Hypokalemia [E87.6] 11/03/2021 Yes    Severe protein-calorie malnutrition [E43] 10/11/2015 Yes    Hepatic encephalopathy [K76.82] 10/11/2015 Yes    Thrombocytopenia [D69.6] 06/15/2015 Yes    Cirrhosis, Laennec's [K70.30] 06/15/2015 Yes    History of seizure [Z87.898] 06/15/2015 Yes      Problems Resolved During this Admission:    Diagnosis Date Noted Date Resolved POA    Hypophosphatemia [E83.39] 05/26/2023 07/16/2023 Yes    Bipolar 1 disorder [F31.9] 06/09/2020 08/15/2023 Yes       Discharged Condition: good    Disposition: Skilled Nursing Facility    Follow Up:    Patient Instructions:   No discharge procedures on file.    Significant Diagnostic Studies: Labs:   BMP:   Recent Labs   Lab 08/15/23  0450 08/16/23  0447   * 144*   * 134*   K 3.6 3.3*    111*   CO2 14* 15*   BUN 9 7   CREATININE 0.5 0.6   CALCIUM 7.5* 7.4*   MG 1.9 1.9       Pending Diagnostic Studies:     Procedure Component Value Units Date/Time    Calcium, ionized [[id number]] Collected: 07/07/23 1614    Order Status: Sent Lab Status: In process Updated: 07/07/23 1614    Specimen: Blood     Magnesium [[id number]] Collected: 07/09/23 0440    Order Status: Sent Lab Status: In process Updated: 07/09/23 0441    Specimen: Blood          Medications:  Reconciled Home Medications:      Medication List      START taking these medications    levetiracetam 500 mg/5 mL (5 mL) Soln  Take 10 mLs (1,000 mg total) by mouth 2 (two) times daily.  Replaces: levETIRAcetam 1000 MG tablet     lithium 300 MG CR tablet  Commonly known as: LITHOBID  Take 1 tablet  (300 mg total) by mouth every evening.  Replaces: lithium carbonate 150 MG capsule     pantoprazole 40 mg suspension  Commonly known as: PROTONIX  Take 1 packet (40 mg total) by mouth 2 (two) times daily.  Replaces: pantoprazole 40 MG tablet        CHANGE how you take these medications    lactulose 20 gram/30 mL Soln  Commonly known as: CHRONULAC  Take 45 mLs (30 g total) by mouth 3 (three) times daily.  What changed:   · medication strength  · how much to take     OLANZapine 5 MG tablet  Commonly known as: ZyPREXA  Take 1 tablet (5 mg total) by mouth every evening.  What changed:   · medication strength  · how much to take        CONTINUE taking these medications    rifAXIMin 550 mg Tab  Commonly known as: XIFAXAN  Take 1 tablet (550 mg total) by mouth 2 (two) times daily.        STOP taking these medications    ARIPiprazole 20 MG Tab  Commonly known as: ABILIFY     folic acid 1 MG tablet  Commonly known as: FOLVITE     furosemide 20 MG tablet  Commonly known as: LASIX     levETIRAcetam 1000 MG tablet  Commonly known as: KEPPRA  Replaced by: levetiracetam 500 mg/5 mL (5 mL) Soln     lithium carbonate 150 MG capsule  Replaced by: lithium 300 MG CR tablet     magnesium oxide 400 mg (241.3 mg magnesium) tablet  Commonly known as: MAG-OX     pantoprazole 40 MG tablet  Commonly known as: PROTONIX  Replaced by: pantoprazole 40 mg suspension     propranoloL 40 MG tablet  Commonly known as: INDERAL     QUEtiapine 300 MG Tab  Commonly known as: SEROQUEL     sodium bicarbonate 650 MG tablet     spironolactone 50 MG tablet  Commonly known as: ALDACTONE     zonisamide 100 MG Cap  Commonly known as: ZONEGRAN        ASK your doctor about these medications    ciprofloxacin HCl 500 MG tablet  Commonly known as: CIPRO  Take 1 tablet (500 mg total) by mouth every 12 (twelve) hours. for 2 days  Ask about: Should I take this medication?            Indwelling Lines/Drains at time of discharge:   Lines/Drains/Airways     Drain  Duration                 Urethral Catheter -- days                Time spent on the discharge of patient: 15 minutes         Jermain Coates MD  Department of Hospital Medicine  [facility] - Telemetry Stepdown

## 2023-08-16 NOTE — SUBJECTIVE & OBJECTIVE
Subjective:     Interval History: NAEON. Continues to spike fevers. No discernible pocket was found by IR for paracentesis yesterday.  Reports feeling better.    Review of Systems   Constitutional:  Positive for chills and fever.   Gastrointestinal:  Positive for abdominal distention and abdominal pain. Negative for blood in stool, constipation, nausea and vomiting.   Musculoskeletal:  Negative for neck pain and neck stiffness.   Skin:  Negative for pallor and rash.   Neurological:  Negative for dizziness and light-headedness.   Psychiatric/Behavioral:  Positive for confusion. Negative for agitation.      Objective:     Vital Signs (Most Recent):  Temp: (!) 100.5 °F (38.1 °C) (08/16/23 1553)  Pulse: 83 (08/16/23 1605)  Resp: 18 (08/16/23 1553)  BP: (!) 118/57 (08/16/23 1553)  SpO2: 95 % (08/16/23 1553) Vital Signs (24h Range):  Temp:  [96.3 °F (35.7 °C)-100.5 °F (38.1 °C)] 100.5 °F (38.1 °C)  Pulse:  [79-89] 83  Resp:  [16-18] 18  SpO2:  [92 %-96 %] 95 %  BP: (106-130)/(55-62) 118/57     Weight: 52.2 kg (115 lb) (08/16/23 1204)  Body mass index is 21.03 kg/m².      Intake/Output Summary (Last 24 hours) at 8/16/2023 1638  Last data filed at 8/16/2023 0521  Gross per 24 hour   Intake --   Output 700 ml   Net -700 ml       Lines/Drains/Airways       Drain  Duration                  Urethral Catheter -- days              Peripheral Intravenous Line  Duration                  External Jugular IV 08/14/23 0913 2 days         Peripheral IV - Single Lumen 08/14/23 0914 18 G Left 2 days                     Physical Exam  Constitutional:       General: She is not in acute distress.  HENT:      Head: Normocephalic and atraumatic.      Right Ear: External ear normal.      Left Ear: External ear normal.      Nose: Nose normal. No congestion.      Mouth/Throat:      Mouth: Mucous membranes are moist.      Pharynx: Oropharynx is clear.   Eyes:      General: No scleral icterus.        Right eye: No discharge.         Left eye:  No discharge.      Extraocular Movements: Extraocular movements intact.   Cardiovascular:      Rate and Rhythm: Normal rate and regular rhythm.      Heart sounds: Murmur heard.   Pulmonary:      Effort: Pulmonary effort is normal. No respiratory distress.   Abdominal:      General: Bowel sounds are normal. There is distension.      Tenderness: There is no abdominal tenderness.      Comments: Slightly distended abdomen. Nontender to palpation in all 4 quadrants   Musculoskeletal:      Right lower leg: Edema present.      Left lower leg: Edema present.      Comments: +2 pitting edema to level of mid thigh   Skin:     General: Skin is warm and dry.      Findings: No rash.   Neurological:      General: No focal deficit present.      Mental Status: She is alert and oriented to person, place, and time.          Significant Labs:  Recent Lab Results         08/16/23  1417   08/16/23  0447        Albumin   1.7       Alkaline Phosphatase   117       ALT   12       Anion Gap   8       Ao peak fran 2.35         Ao VTI 42.40         AST   33       AV valve area 1.50         CHRISTINA by Velocity Ratio 1.50         AV mean gradient 12         AV index (prosthetic) 0.51         AV peak gradient 22         AV Velocity Ratio 0.51         Baso #   0.01       Basophil %   0.2       BILIRUBIN TOTAL   2.5  Comment: For infants and newborns, interpretation of results should be based  on gestational age, weight and in agreement with clinical  observations.    Premature Infant recommended reference ranges:  Up to 24 hours.............<8.0 mg/dL  Up to 48 hours............<12.0 mg/dL  3-5 days..................<15.0 mg/dL  6-29 days.................<15.0 mg/dL         BSA 1.51         BUN   7       Calcium   7.4       Chloride   111       CO2   15       Creatinine   0.6       Left Ventricle Relative Wall Thickness 0.54         Differential Method   Automated       E/A ratio 1.24         E/E' ratio 5.83         eGFR   >60       EF 70         Eos #    0.2       Eosinophil %   4.0       E wave deceleration time 186.85         FS 41         Glucose   144       Gran # (ANC)   3.5       Gran %   78.5       Hematocrit   24.8       Hemoglobin   7.9       Immature Grans (Abs)   0.02  Comment: Mild elevation in immature granulocytes is non specific and   can be seen in a variety of conditions including stress response,   acute inflammation, trauma and pregnancy. Correlation with other   laboratory and clinical findings is essential.         Immature Granulocytes   0.4       IVSd 0.87         LA WIDTH 3.5         Left Atrium Major Axis 5.96         Left Atrium Minor Axis 5.12         LA size 3.46         LA volume 56.70          40.68         LA vol index 37.5         LA Volume Index (Mod) 26.9         LVOT area 3.0         LV LATERAL E/E' RATIO 5.10         LV SEPTAL E/E' RATIO 6.80         LV EDV .26         LV Diastolic Volume Index 73.02         LVIDd 4.85         LVIDs 2.84         LV mass 195.98         LV Mass Index 130         Left Ventricular Outflow Tract Mean Gradient 3.02         Left Ventricular Outflow Tract Mean Velocity 0.83         LVOT diameter 1.94         LVOT peak delgado 1.19         LVOT stroke volume 63.52         LVOT peak VTI 21.50         LV ESV BP 30.60         LV Systolic Volume Index 20.3         Lymph #   0.4       Lymph %   9.9       Magnesium   1.9       MCH   34.6       MCHC   31.9       MCV   109       Mean e' 0.18         Mono #   0.3       Mono %   7.0       MPV   10.8       Mr max delgado 4.80         MV valve area p 1/2 method 3.72         MV valve area by continuity eq 2.19         MV mean gradient 2         MV peak gradient 5         MV Peak A Delgado 0.82         MV Peak E Delgado 1.02         MV stenosis pressure 1/2 time 59.07         MV VTI 29.0         nRBC   0       Phosphorus   1.8       Platelets   52       Potassium   3.3       PROTEIN TOTAL   5.1       Posterior Wall 1.32         RA Major Axis 4.95         Est. RA pres 3          RBC   2.28       RDW   17.2       RV S' 24.53         RV TB RVSP 5         RVDD 2.38         Sodium   134       TAPSE 3.49         TDI SEPTAL 0.15         TDI LATERAL 0.20         Triscuspid Valve Regurgitation Peak Gradient 24         TR Max Delgado 2.43         TV resting pulmonary artery pressure 27         WBC   4.46       ZLVIDD 0.88         ZLVIDS 0.23                   Significant Imaging:  Imaging results within the past 24 hours have been reviewed.

## 2023-08-16 NOTE — HPI
57 y/o F with a PMH of anemia,  alcohol abuse with advanced liver disease with alcoholic cirrhosis (was being considered a candidate for LT at one point 2015), bipolar disorder depression, history of seizure, osteoarthritis, thrombocytopenia brought in by EMS from [facility] on 8/14/23 with complaints of worsening confusion worse than baseline with abdominal pain, chills and then 101.4 fever when she arrived in the ED.   Na 132, K 3.5, Bun/Cr 8/0.5, , wbc 3.21, H/H 9.0/28, PLT 58, INR 1.9, Lactate elevated 2.4, Ca 7.7, Alb 1.9, CXR Nonspecific bibasilar opacities could relate to atelectasis, aspiration or pneumonia. UA with pyuria with WBC above 80 in UA   Pt was admitted for possible PNA, UTI and SBP. GI and IR consulted. IR attempted paracentesis 8/15/23 but not enough ascitic fluid, b/l LE venous doppler with chronic DVT  Later Ucx with ecoli and blood cx 4/4 bottles 8/14/23 with GNR    8/17 = admit blood cx also now Ecoli with sens to CFTX

## 2023-08-16 NOTE — SUBJECTIVE & OBJECTIVE
Past Medical History:   Diagnosis Date    Addiction to drug     Alcohol abuse     Alcohol abuse, in remission 6/15/2015    14.5 weeks ago; AA weekly    Anemia     Anxiety 6/15/2015    Behavioral problem     Bipolar disorder     Bipolar disorder in remission 6/15/2015    Cirrhosis, Laennec's 6/15/2015    Depression     Encounter for blood transfusion     Epistaxis 6/15/2015    Fatigue     Glaucoma     Hematuria     Hepatic encephalopathy 6/15/2015    Hepatic enlargement     History of psychiatric care     History of psychiatric hospitalization     History of seizure 6/15/2015    1    hx of intentional Tylenol overdose 6/15/2015    2005- situational and hx of bipolar    Hx of psychiatric care     Macrocytic anemia 9/18/2015    6 units PRBC since June 2015    Jeana     Osteoarthritis     Other ascites 6/15/2015    Psychiatric exam requested by authority     Psychiatric problem     Psychosis 9/26/2019    Renal disorder     Seizures     Self-harming behavior     Suicide attempt     Therapy     Thrombocytopenia 6/15/2015       Past Surgical History:   Procedure Laterality Date    COSMETIC SURGERY      EMBOLIZATION N/A 6/11/2023    Procedure: EMBOLIZATION, BLOOD VESSEL;  Surgeon: Debbie Surgeon;  Location: [facility];  Service: Anesthesiology;  Laterality: N/A;    ESOPHAGOGASTRODUODENOSCOPY      ESOPHAGOGASTRODUODENOSCOPY N/A 7/6/2023    Procedure: EGD (ESOPHAGOGASTRODUODENOSCOPY);  Surgeon: Bernice Guillen MD;  Location: [address]);  Service: Endoscopy;  Laterality: N/A;    ESOPHAGOGASTRODUODENOSCOPY N/A 7/11/2023    Procedure: EGD (ESOPHAGOGASTRODUODENOSCOPY);  Surgeon: Rangel Broussard MD;  Location: [address]);  Service: Endoscopy;  Laterality: N/A;       Review of patient's allergies indicates:   Allergen Reactions    Sulfa (sulfonamide antibiotics) Rash    Codeine Nausea And Vomiting       Medications:  Medications Prior to Admission   Medication Sig    ferrous sulfate (FEOSOL) 325 mg (65 mg iron)  Tab tablet Take 325 mg by mouth once daily.    lactulose (CHRONULAC) 20 gram/30 mL Soln Take 45 mLs (30 g total) by mouth 3 (three) times daily.    levetiracetam 500 mg/5 mL (5 mL) Soln Take 10 mLs (1,000 mg total) by mouth 2 (two) times daily.    lithium (LITHOBID) 300 MG CR tablet Take 1 tablet (300 mg total) by mouth every evening.    OLANZapine (ZYPREXA) 5 MG tablet Take 1 tablet (5 mg total) by mouth every evening.    pantoprazole (PROTONIX) 40 mg suspension Take 1 packet (40 mg total) by mouth 2 (two) times daily.    rifAXIMin (XIFAXAN) 550 mg Tab Take 1 tablet (550 mg total) by mouth 2 (two) times daily.     Antibiotics (From admission, onward)      Start     Stop Route Frequency Ordered    08/15/23 1830  cefTRIAXone (ROCEPHIN) 2 g in dextrose 5 % in water (D5W) 100 mL IVPB (MB+)         -- IV Every 24 hours (non-standard times) 08/15/23 1435    08/14/23 2100  rifAXIMin tablet 550 mg         -- Oral 2 times daily 08/14/23 1827          Antifungals (From admission, onward)      None          Antivirals (From admission, onward)      None             Immunization History   Administered Date(s) Administered    COVID-19, MRNA, LN-S, PF (MODERNA FULL 0.5 ML DOSE) 04/08/2021, 05/06/2021, 01/26/2022    PPD Test 09/20/2019, 07/21/2023       Family History       Problem Relation (Age of Onset)    Alcohol abuse Father, Sister, Paternal Grandfather    Bipolar disorder Father    Hypertension Mother    Suicide Father          Social History     Socioeconomic History    Marital status: Single   Occupational History    Occupation: Disabled     Employer: DISABLED   Tobacco Use    Smoking status: Never    Smokeless tobacco: Never   Substance and Sexual Activity    Alcohol use: Not Currently     Comment: hx of ETOH abuse with cirrhosis of liver    Drug use: No    Sexual activity: Not Currently   Other Topics Concern    Patient feels they ought to cut down on drinking/drug use No    Patient annoyed by others criticizing their  drinking/drug use No    Patient has felt bad or guilty about drinking/drug use No    Patient has had a drink/used drugs as an eye opener in the AM No   Social History Narrative    Pt has 1 older and 1 younger sister.  Her father committed suicide when she was 18.  She has a [employer] degree and worked as an , but she has been disabled since age 40.  She never  and has no children.  She is not dating and claims no current friends.  She currently lives with her mother along with her pet dog.  She denies any hobbies and says she is not Sabianist.     Social Determinants of Health     Financial Resource Strain: Low Risk  (8/15/2023)    Overall Financial Resource Strain (CARDIA)     Difficulty of Paying Living Expenses: Not hard at all   Food Insecurity: No Food Insecurity (8/15/2023)    Hunger Vital Sign     Worried About Running Out of Food in the Last Year: Never true     Ran Out of Food in the Last Year: Never true   Transportation Needs: No Transportation Needs (8/15/2023)    PRAPARE - Transportation     Lack of Transportation (Medical): No     Lack of Transportation (Non-Medical): No   Physical Activity: Inactive (8/15/2023)    Exercise Vital Sign     Days of Exercise per Week: 0 days     Minutes of Exercise per Session: 0 min   Stress: Stress Concern Present (8/15/2023)    [employer] [place] [employer] - Occupational Stress Questionnaire     Feeling of Stress : Very much   Social Connections: Unknown (8/15/2023)    Social Connection and Isolation Panel [NHANES]     Frequency of Communication with Friends and Family: Patient refused     Frequency of Social Gatherings with Friends and Family: Patient refused     Attends Bahai Services: Never     Active Member of Clubs or Organizations: No     Attends Club or Organization Meetings: Never     Marital Status: Never    Housing Stability: Low Risk  (8/15/2023)    Housing Stability Vital Sign     Unable to Pay for Housing in the Last Year: No      Number of Places Lived in the Last Year: 1     Unstable Housing in the Last Year: No     Review of Systems   Constitutional:  Positive for activity change, appetite change and fatigue. Negative for chills.   HENT:  Negative for congestion, mouth sores and nosebleeds.    Eyes:  Negative for discharge and itching.   Respiratory:  Negative for apnea and chest tightness.    Cardiovascular:  Negative for chest pain.   Gastrointestinal:  Positive for abdominal distention, abdominal pain and nausea. Negative for diarrhea and vomiting.   Genitourinary:  Positive for dysuria. Negative for difficulty urinating.   Musculoskeletal:  Negative for arthralgias and joint swelling.   Skin:  Negative for wound.   Neurological:  Negative for dizziness.   Psychiatric/Behavioral:  Negative for agitation and behavioral problems.    All other systems reviewed and are negative.    Objective:     Vital Signs (Most Recent):  Temp: 97 °F (36.1 °C) (08/16/23 1135)  Pulse: 85 (08/16/23 1204)  Resp: 18 (08/16/23 1135)  BP: (!) 116/56 (08/16/23 1135)  SpO2: 96 % (08/16/23 1135) Vital Signs (24h Range):  Temp:  [96.3 °F (35.7 °C)-100.3 °F (37.9 °C)] 97 °F (36.1 °C)  Pulse:  [79-93] 85  Resp:  [16-19] 18  SpO2:  [92 %-96 %] 96 %  BP: (106-130)/(55-62) 116/56     Weight: 52.2 kg (115 lb 1.3 oz)  Body mass index is 21.05 kg/m².    Estimated Creatinine Clearance: 81.8 mL/min (based on SCr of 0.6 mg/dL).     Physical Exam  Vitals and nursing note reviewed.   Constitutional:       General: She is not in acute distress.     Appearance: She is ill-appearing. She is not toxic-appearing.      Comments: Thin lean with temporal wasting, pale looking   HENT:      Head: Atraumatic.      Nose: Nose normal. No rhinorrhea.      Mouth/Throat:      Mouth: Mucous membranes are moist.      Pharynx: No posterior oropharyngeal erythema.   Eyes:      General:         Right eye: No discharge.         Left eye: No discharge.      Extraocular Movements: Extraocular  movements intact.   Cardiovascular:      Rate and Rhythm: Normal rate and regular rhythm.      Heart sounds: Murmur heard.   Pulmonary:      Effort: Pulmonary effort is normal. No respiratory distress.      Breath sounds: Normal breath sounds. No wheezing.   Abdominal:      General: There is distension.      Palpations: Abdomen is soft.      Tenderness: There is no abdominal tenderness.   Musculoskeletal:         General: Swelling present. No tenderness.      Cervical back: Normal range of motion and neck supple.      Right lower leg: Edema present.      Left lower leg: Edema present.   Skin:     General: Skin is warm.      Findings: No erythema or rash.   Neurological:      General: No focal deficit present.      Mental Status: She is alert and oriented to person, place, and time.   Psychiatric:         Mood and Affect: Mood normal.         Behavior: Behavior normal.          Significant Labs: CBC:   Recent Labs   Lab 08/15/23  0450 08/16/23  0447   WBC 3.87* 4.46   HGB 8.1* 7.9*   HCT 26.3* 24.8*   PLT 55* 52*     CMP:   Recent Labs   Lab 08/15/23  0450 08/16/23  0447   * 134*   K 3.6 3.3*    111*   CO2 14* 15*   * 144*   BUN 9 7   CREATININE 0.5 0.6   CALCIUM 7.5* 7.4*   PROT 5.1* 5.1*   ALBUMIN 1.7* 1.7*   BILITOT 3.9* 2.5*   ALKPHOS 87 117   AST 25 33   ALT 13 12   ANIONGAP 10 8     Microbiology Results (last 7 days)       Procedure Component Value Units Date/Time    Blood culture x two cultures. Draw prior to antibiotics. [[id number]]  (Abnormal) Collected: 08/14/23 1019    Order Status: Completed Specimen: Blood Updated: 08/16/23 1232     Blood Culture, Routine Gram stain aer bottle: Gram negative rods      Gram stain enoc bottle: Gram negative rods      Results called to and read back by:Marsha Messina Rn 08/15/2023 00:50      ESCHERICHIA COLI  For susceptibility see order #[id number]      Narrative:      Aerobic and anaerobic    Blood culture x two cultures. Draw prior to antibiotics.  [[id number]]  (Abnormal) Collected: 08/14/23 0954    Order Status: Completed Specimen: Blood from Peripheral, Jugular, External Left Updated: 08/16/23 1232     Blood Culture, Routine Gram stain aer bottle: Gram negative rods      Gram stain enoc bottle: Gram negative rods      Positive results previously called 08/15/2023      ESCHERICHIA COLI  Susceptibility pending      Narrative:      Aerobic and anaerobic    Urine culture [[id number]]  (Abnormal)  (Susceptibility) Collected: 08/14/23 0846    Order Status: Completed Specimen: Urine Updated: 08/16/23 0956     Urine Culture, Routine ESCHERICHIA COLI  >100,000 cfu/ml      Narrative:      Specimen Source->Urine    Rapid Organism ID by PCR (from Blood culture) [[id number]]  (Abnormal) Collected: 08/14/23 1019    Order Status: Completed Updated: 08/15/23 0117     Enterococcus faecalis Not Detected     Enterococcus faecium Not Detected     Listeria monocytogenes Not Detected     Staphylococcus spp. Not Detected     Staphylococcus aureus Not Detected     Staphylococcus epidermidis Not Detected     Staphylococcus lugdunensis Not Detected     Streptococcus species Not Detected     Streptococcus agalactiae Not Detected     Streptococcus pneumoniae Not Detected     Streptococcus pyogenes Not Detected     Acinetobacter calcoaceticus/baumannii complex Not Detected     Bacteroides fragilis Not Detected     Enterobacterales See species for ID     Enterobacter cloacae complex Not Detected     Escherichia coli Detected     Klebsiella aerogenes Not Detected     Klebsiella oxytoca Not Detected     Klebsiella pneumoniae group Not Detected     Proteus Not Detected     Salmonella sp Not Detected     Serratia marcescens Not Detected     Haemophilus influenzae Not Detected     Neisseria meningtidis Not Detected     Pseudomonas aeruginosa Not Detected     Stenotrophomonas maltophilia Not Detected     Candida albicans Not Detected     Candida auris Not Detected     Candida glabrata Not  Detected     Candida krusei Not Detected     Candida parapsilosis Not Detected     Candida tropicalis Not Detected     Cryptococcus neoformans/gattii Not Detected     CTX-M (ESBL ) Not Detected     IMP (Carbapenem resistant) Not Detected     KPC resistance gene (Carbapenem resistant) Not Detected     mcr-1  Not Detected     mec A/C  Not Detected     mec A/C and MREJ (MRSA) gene Not Detected     NDM (Carbapenem resistant) Not Detected     OXA-48-like (Carbapenem resistant) Not Detected     van A/B (VRE gene) Not Detected     VIM (Carbapenem resistant) Not Detected    Narrative:      Aerobic and anaerobic    Culture, Body Fluid - Bactec [[id number]]     Order Status: No result Specimen: Body Fluid from Peritoneal Fluid     Gram stain [[id number]]     Order Status: No result Specimen: Body Fluid from Peritoneal Fluid     Influenza A & B by Molecular [[id number]] Collected: 08/14/23 0849    Order Status: Completed Specimen: Nasopharyngeal Swab Updated: 08/14/23 0932     Influenza A, Molecular Negative     Influenza B, Molecular Negative     Flu A & B Source Nasal swab          All pertinent labs within the past 24 hours have been reviewed.    Significant Imaging: I have reviewed all pertinent imaging results/findings within the past 24 hours.

## 2023-08-16 NOTE — ASSESSMENT & PLAN NOTE
-resolved.  Patient is awake alert orient x3 currently  -Due to cirrhosis and hyperammonemia  -Improved.  Patient is awake alert and oriented x3 currently  -ammonia level on 08/14/2023= 55 (elevated)  -Continue Lactulose p.o. t.i.d.  -continue rifaximin

## 2023-08-16 NOTE — PROGRESS NOTES
[facility] - Telemetry  Gastroenterology  Progress Note    Patient Name: Marely Hamilton  MRN: [id number]  Admission Date: 8/14/2023  Hospital Length of Stay: 2 days  Code Status: Full Code   Attending Provider: Franci Stringer MD  Consulting Provider: Terrence Arriola MD  Primary Care Physician: Viktor Ross MD  Principal Problem: Severe sepsis        Subjective:     Interval History: NAEON. Continues to spike fevers. No discernible pocket was found by IR for paracentesis yesterday.  Reports feeling better.    Review of Systems   Constitutional:  Positive for chills and fever.   Gastrointestinal:  Positive for abdominal distention and abdominal pain. Negative for blood in stool, constipation, nausea and vomiting.   Musculoskeletal:  Negative for neck pain and neck stiffness.   Skin:  Negative for pallor and rash.   Neurological:  Negative for dizziness and light-headedness.   Psychiatric/Behavioral:  Positive for confusion. Negative for agitation.      Objective:     Vital Signs (Most Recent):  Temp: (!) 100.5 °F (38.1 °C) (08/16/23 1553)  Pulse: 83 (08/16/23 1605)  Resp: 18 (08/16/23 1553)  BP: (!) 118/57 (08/16/23 1553)  SpO2: 95 % (08/16/23 1553) Vital Signs (24h Range):  Temp:  [96.3 °F (35.7 °C)-100.5 °F (38.1 °C)] 100.5 °F (38.1 °C)  Pulse:  [79-89] 83  Resp:  [16-18] 18  SpO2:  [92 %-96 %] 95 %  BP: (106-130)/(55-62) 118/57     Weight: 52.2 kg (115 lb) (08/16/23 1204)  Body mass index is 21.03 kg/m².      Intake/Output Summary (Last 24 hours) at 8/16/2023 1638  Last data filed at 8/16/2023 0521  Gross per 24 hour   Intake --   Output 700 ml   Net -700 ml       Lines/Drains/Airways       Drain  Duration                  Urethral Catheter -- days              Peripheral Intravenous Line  Duration                  External Jugular IV 08/14/23 0913 2 days         Peripheral IV - Single Lumen 08/14/23 0914 18 G Left 2 days                     Physical Exam  Constitutional:       General: She is not in acute  distress.  HENT:      Head: Normocephalic and atraumatic.      Right Ear: External ear normal.      Left Ear: External ear normal.      Nose: Nose normal. No congestion.      Mouth/Throat:      Mouth: Mucous membranes are moist.      Pharynx: Oropharynx is clear.   Eyes:      General: No scleral icterus.        Right eye: No discharge.         Left eye: No discharge.      Extraocular Movements: Extraocular movements intact.   Cardiovascular:      Rate and Rhythm: Normal rate and regular rhythm.      Heart sounds: Murmur heard.   Pulmonary:      Effort: Pulmonary effort is normal. No respiratory distress.   Abdominal:      General: Bowel sounds are normal. There is distension.      Tenderness: There is no abdominal tenderness.      Comments: Slightly distended abdomen. Nontender to palpation in all 4 quadrants   Musculoskeletal:      Right lower leg: Edema present.      Left lower leg: Edema present.      Comments: +2 pitting edema to level of mid thigh   Skin:     General: Skin is warm and dry.      Findings: No rash.   Neurological:      General: No focal deficit present.      Mental Status: She is alert and oriented to person, place, and time.          Significant Labs:  Recent Lab Results         08/16/23  1417   08/16/23  0447        Albumin   1.7       Alkaline Phosphatase   117       ALT   12       Anion Gap   8       Ao peak fran 2.35         Ao VTI 42.40         AST   33       AV valve area 1.50         CHRISTINA by Velocity Ratio 1.50         AV mean gradient 12         AV index (prosthetic) 0.51         AV peak gradient 22         AV Velocity Ratio 0.51         Baso #   0.01       Basophil %   0.2       BILIRUBIN TOTAL   2.5  Comment: For infants and newborns, interpretation of results should be based  on gestational age, weight and in agreement with clinical  observations.    Premature Infant recommended reference ranges:  Up to 24 hours.............<8.0 mg/dL  Up to 48 hours............<12.0 mg/dL  3-5  days..................<15.0 mg/dL  6-29 days.................<15.0 mg/dL         BSA 1.51         BUN   7       Calcium   7.4       Chloride   111       CO2   15       Creatinine   0.6       Left Ventricle Relative Wall Thickness 0.54         Differential Method   Automated       E/A ratio 1.24         E/E' ratio 5.83         eGFR   >60       EF 70         Eos #   0.2       Eosinophil %   4.0       E wave deceleration time 186.85         FS 41         Glucose   144       Gran # (ANC)   3.5       Gran %   78.5       Hematocrit   24.8       Hemoglobin   7.9       Immature Grans (Abs)   0.02  Comment: Mild elevation in immature granulocytes is non specific and   can be seen in a variety of conditions including stress response,   acute inflammation, trauma and pregnancy. Correlation with other   laboratory and clinical findings is essential.         Immature Granulocytes   0.4       IVSd 0.87         LA WIDTH 3.5         Left Atrium Major Axis 5.96         Left Atrium Minor Axis 5.12         LA size 3.46         LA volume 56.70          40.68         LA vol index 37.5         LA Volume Index (Mod) 26.9         LVOT area 3.0         LV LATERAL E/E' RATIO 5.10         LV SEPTAL E/E' RATIO 6.80         LV EDV .26         LV Diastolic Volume Index 73.02         LVIDd 4.85         LVIDs 2.84         LV mass 195.98         LV Mass Index 130         Left Ventricular Outflow Tract Mean Gradient 3.02         Left Ventricular Outflow Tract Mean Velocity 0.83         LVOT diameter 1.94         LVOT peak fran 1.19         LVOT stroke volume 63.52         LVOT peak VTI 21.50         LV ESV BP 30.60         LV Systolic Volume Index 20.3         Lymph #   0.4       Lymph %   9.9       Magnesium   1.9       MCH   34.6       MCHC   31.9       MCV   109       Mean e' 0.18         Mono #   0.3       Mono %   7.0       MPV   10.8       Mr max fran 4.80         MV valve area p 1/2 method 3.72         MV valve area by continuity eq  2.19         MV mean gradient 2         MV peak gradient 5         MV Peak A Delgado 0.82         MV Peak E Delgado 1.02         MV stenosis pressure 1/2 time 59.07         MV VTI 29.0         nRBC   0       Phosphorus   1.8       Platelets   52       Potassium   3.3       PROTEIN TOTAL   5.1       Posterior Wall 1.32         RA Major Axis 4.95         Est. RA pres 3         RBC   2.28       RDW   17.2       RV S' 24.53         RV TB RVSP 5         RVDD 2.38         Sodium   134       TAPSE 3.49         TDI SEPTAL 0.15         TDI LATERAL 0.20         Triscuspid Valve Regurgitation Peak Gradient 24         TR Max Delgado 2.43         TV resting pulmonary artery pressure 27         WBC   4.46       ZLVIDD 0.88         ZLVIDS 0.23                   Significant Imaging:  Imaging results within the past 24 hours have been reviewed.    Assessment/Plan:     GI  Alcoholic cirrhosis  Patient with hx of decompensated alcoholic cirrhosis, has followed [facility] and [facility] in the past. Per chart review, denied liver transplant in 2015 due to malnutrition. Per note in July 2023, will corodinate re-evaluation for lvier tranpslant in outaptient setting. No hx of paracentesis. No signs of active GI bleeding at this time. MELD 22. Child Raymundo class C. Went for IR para yesterday. No pocket found.     Plan:  - Continue w/u ongoing fever, SBP less likely   - Continue home lactulose, titrate to 3 bowel movements per day  - Followup with outpatient [facility] upon discharge         Thank you for your consult. I will sign off. Please contact us if you have any additional questions.    Terrence Arriola MD  Gastroenterology  [facility] - Telemetry

## 2023-08-16 NOTE — ASSESSMENT & PLAN NOTE
This patient does have evidence of infective focus  My overall impression is sepsis.  Source: Urinary Tract  Antibiotics given-   Antibiotics (72h ago, onward)    Start     Stop Route Frequency Ordered    08/15/23 1830  cefTRIAXone (ROCEPHIN) 2 g in dextrose 5 % in water (D5W) 100 mL IVPB (MB+)         -- IV Every 24 hours (non-standard times) 08/15/23 1435    08/14/23 2100  rifAXIMin tablet 550 mg         -- Oral 2 times daily 08/14/23 1827        Latest lactate reviewed-  Recent Labs   Lab 08/14/23  0913 08/15/23  1257   LACTATE 2.4* 3.1*     Organ dysfunction indicated by Encephalopathy and Thrombocytopenia     Will Not start Pressors- Levophed for MAP of 65  Source control achieved by:  Rocephin IV    -ID consulted= appreciate input and follow recommendations  -echocardiogram on 08/16/2023= pending

## 2023-08-16 NOTE — ASSESSMENT & PLAN NOTE
-afebrile   -no leukocytosis  -Lactate on admit= 2.4  -monitor lactic acidosis  -Blood cx on 08/14/2023= E coli.  Sensitive to Rocephin.  -blood PCR on 08/14/2023= E coli and Enterobacterales  -Urine culture on 08/14/2023= E coli.  Sensitive to Rocephin  -renal ultrasound per ID recommendations on 08/16/2023= pending  -Rocephin IV daily based on sensitivity to E colii.  Ordered on 08/14/2023  -ID consulted= appreciate input follow recommendations

## 2023-08-16 NOTE — ASSESSMENT & PLAN NOTE
57 y/o F with a PMH of anemia,  alcohol abuse with advanced liver disease with alcoholic cirrhosis (was being considered a candidate for LT at one point 2015), bipolar disorder depression, history of seizure, osteoarthritis, thrombocytopenia brought in by EMS from [facility] on 8/14/23 with complaints of worsening confusion worse than baseline with abdominal pain, chills and then 101.4 fever when she arrived in the ED.   Na 132, K 3.5, Bun/Cr 8/0.5, , wbc 3.21, H/H 9.0/28, PLT 58, INR 1.9, Lactate elevated 2.4, Ca 7.7, Alb 1.9, CXR Nonspecific bibasilar opacities could relate to atelectasis, aspiration or pneumonia. UA with pyuria with WBC above 80 in UA   Pt was admitted for possible PNA, UTI and SBP. GI and IR consulted. IR attempted paracentesis 8/15/23 but not enough ascitic fluid. b/l LE venous doppler with chronic DVT  Later Ucx with ecoli R to cipro and levo and blood cx 4/4 bottles 8/14/23 with GNR/Ecoli per BCID  Recommendations:  -Continue ceftriaxone  -Follow blood cx  -Would recommend renal US  Will follow

## 2023-08-16 NOTE — ASSESSMENT & PLAN NOTE
-bilateral lower extremity venous ultrasound on 08/15/2023= Chronic DVT of the right common femoral and saphenofemoral junction. Bilateral inguinal ascites  -IVC filter in place (patient had IVC filter placed on recent hospitalization )  -patient not a candidate for anticoagulation due to severe thrombocytopenia

## 2023-08-16 NOTE — ASSESSMENT & PLAN NOTE
-no overt bleeding seen or reported   -monitor H&H= stable range but with decrease  -FOB T= pending   -PPI bid  -consider transfusion 1 unit PRBC if hemoglobin less than 7  -GI consulted= appreciate input and follow recommendations

## 2023-08-17 LAB
ALBUMIN SERPL BCP-MCNC: 2 G/DL (ref 3.5–5.2)
ALP SERPL-CCNC: 168 U/L (ref 55–135)
ALT SERPL W/O P-5'-P-CCNC: 21 U/L (ref 10–44)
ANION GAP SERPL CALC-SCNC: 12 MMOL/L (ref 8–16)
AST SERPL-CCNC: 54 U/L (ref 10–40)
BACTERIA BLD CULT: ABNORMAL
BASOPHILS # BLD AUTO: 0.01 K/UL (ref 0–0.2)
BASOPHILS NFR BLD: 0.2 % (ref 0–1.9)
BILIRUB SERPL-MCNC: 4.2 MG/DL (ref 0.1–1)
BUN SERPL-MCNC: 7 MG/DL (ref 6–20)
CALCIUM SERPL-MCNC: 7.6 MG/DL (ref 8.7–10.5)
CHLORIDE SERPL-SCNC: 106 MMOL/L (ref 95–110)
CO2 SERPL-SCNC: 12 MMOL/L (ref 23–29)
CREAT SERPL-MCNC: 0.6 MG/DL (ref 0.5–1.4)
DIFFERENTIAL METHOD: ABNORMAL
EOSINOPHIL # BLD AUTO: 0 K/UL (ref 0–0.5)
EOSINOPHIL NFR BLD: 0.2 % (ref 0–8)
ERYTHROCYTE [DISTWIDTH] IN BLOOD BY AUTOMATED COUNT: 16.8 % (ref 11.5–14.5)
EST. GFR  (NO RACE VARIABLE): >60 ML/MIN/1.73 M^2
GLUCOSE SERPL-MCNC: 146 MG/DL (ref 70–110)
HCT VFR BLD AUTO: 27.6 % (ref 37–48.5)
HGB BLD-MCNC: 9.3 G/DL (ref 12–16)
IMM GRANULOCYTES # BLD AUTO: 0.12 K/UL (ref 0–0.04)
IMM GRANULOCYTES NFR BLD AUTO: 2.4 % (ref 0–0.5)
LYMPHOCYTES # BLD AUTO: 0.3 K/UL (ref 1–4.8)
LYMPHOCYTES NFR BLD: 6.3 % (ref 18–48)
MAGNESIUM SERPL-MCNC: 1.8 MG/DL (ref 1.6–2.6)
MCH RBC QN AUTO: 35 PG (ref 27–31)
MCHC RBC AUTO-ENTMCNC: 33.7 G/DL (ref 32–36)
MCV RBC AUTO: 104 FL (ref 82–98)
MONOCYTES # BLD AUTO: 0.3 K/UL (ref 0.3–1)
MONOCYTES NFR BLD: 6.5 % (ref 4–15)
NEUTROPHILS # BLD AUTO: 4.3 K/UL (ref 1.8–7.7)
NEUTROPHILS NFR BLD: 84.4 % (ref 38–73)
NRBC BLD-RTO: 0 /100 WBC
PHOSPHATE SERPL-MCNC: 2 MG/DL (ref 2.7–4.5)
PLATELET # BLD AUTO: 65 K/UL (ref 150–450)
PMV BLD AUTO: 11.6 FL (ref 9.2–12.9)
POTASSIUM SERPL-SCNC: 3.3 MMOL/L (ref 3.5–5.1)
PROT SERPL-MCNC: 6.1 G/DL (ref 6–8.4)
RBC # BLD AUTO: 2.66 M/UL (ref 4–5.4)
SODIUM SERPL-SCNC: 130 MMOL/L (ref 136–145)
WBC # BLD AUTO: 5.07 K/UL (ref 3.9–12.7)

## 2023-08-17 PROCEDURE — 84100 ASSAY OF PHOSPHORUS: CPT | Performed by: FAMILY MEDICINE

## 2023-08-17 PROCEDURE — 36415 COLL VENOUS BLD VENIPUNCTURE: CPT | Performed by: FAMILY MEDICINE

## 2023-08-17 PROCEDURE — 83735 ASSAY OF MAGNESIUM: CPT | Performed by: FAMILY MEDICINE

## 2023-08-17 PROCEDURE — 63600175 PHARM REV CODE 636 W HCPCS: Performed by: INTERNAL MEDICINE

## 2023-08-17 PROCEDURE — 11000001 HC ACUTE MED/SURG PRIVATE ROOM

## 2023-08-17 PROCEDURE — C1751 CATH, INF, PER/CENT/MIDLINE: HCPCS

## 2023-08-17 PROCEDURE — 63600175 PHARM REV CODE 636 W HCPCS: Performed by: FAMILY MEDICINE

## 2023-08-17 PROCEDURE — 25000003 PHARM REV CODE 250: Performed by: FAMILY MEDICINE

## 2023-08-17 PROCEDURE — 25000003 PHARM REV CODE 250: Performed by: INTERNAL MEDICINE

## 2023-08-17 PROCEDURE — 63600175 PHARM REV CODE 636 W HCPCS: Performed by: NURSE PRACTITIONER

## 2023-08-17 PROCEDURE — 36410 VNPNXR 3YR/> PHY/QHP DX/THER: CPT

## 2023-08-17 PROCEDURE — 80053 COMPREHEN METABOLIC PANEL: CPT | Performed by: FAMILY MEDICINE

## 2023-08-17 PROCEDURE — 94799 UNLISTED PULMONARY SVC/PX: CPT

## 2023-08-17 PROCEDURE — C9113 INJ PANTOPRAZOLE SODIUM, VIA: HCPCS | Performed by: FAMILY MEDICINE

## 2023-08-17 PROCEDURE — 85025 COMPLETE CBC W/AUTO DIFF WBC: CPT | Performed by: FAMILY MEDICINE

## 2023-08-17 PROCEDURE — 99900035 HC TECH TIME PER 15 MIN (STAT)

## 2023-08-17 PROCEDURE — A4216 STERILE WATER/SALINE, 10 ML: HCPCS | Performed by: FAMILY MEDICINE

## 2023-08-17 RX ORDER — SODIUM CHLORIDE 9 MG/ML
INJECTION, SOLUTION INTRAVENOUS
Status: DISCONTINUED | OUTPATIENT
Start: 2023-08-17 | End: 2023-08-18 | Stop reason: HOSPADM

## 2023-08-17 RX ORDER — POTASSIUM CHLORIDE 20 MEQ/1
40 TABLET, EXTENDED RELEASE ORAL ONCE
Status: COMPLETED | OUTPATIENT
Start: 2023-08-17 | End: 2023-08-17

## 2023-08-17 RX ORDER — MUPIROCIN 20 MG/G
OINTMENT TOPICAL 2 TIMES DAILY
Status: DISCONTINUED | OUTPATIENT
Start: 2023-08-17 | End: 2023-08-18 | Stop reason: HOSPADM

## 2023-08-17 RX ORDER — HALOPERIDOL 5 MG/ML
5 INJECTION INTRAMUSCULAR ONCE
Status: COMPLETED | OUTPATIENT
Start: 2023-08-17 | End: 2023-08-17

## 2023-08-17 RX ADMIN — POLYETHYLENE GLYCOL 3350 17 G: 17 POWDER, FOR SOLUTION ORAL at 10:08

## 2023-08-17 RX ADMIN — MUPIROCIN: 20 OINTMENT TOPICAL at 09:08

## 2023-08-17 RX ADMIN — LACTULOSE 30 G: 20 SOLUTION ORAL at 09:08

## 2023-08-17 RX ADMIN — PANTOPRAZOLE SODIUM 40 MG: 40 INJECTION, POWDER, LYOPHILIZED, FOR SOLUTION INTRAVENOUS at 09:08

## 2023-08-17 RX ADMIN — MUPIROCIN: 20 OINTMENT TOPICAL at 10:08

## 2023-08-17 RX ADMIN — LACTULOSE 30 G: 20 SOLUTION ORAL at 02:08

## 2023-08-17 RX ADMIN — SODIUM CHLORIDE: 9 INJECTION, SOLUTION INTRAVENOUS at 06:08

## 2023-08-17 RX ADMIN — Medication 10 ML: at 09:08

## 2023-08-17 RX ADMIN — LEVETIRACETAM 1000 MG: 500 SOLUTION ORAL at 10:08

## 2023-08-17 RX ADMIN — RIFAXIMIN 550 MG: 550 TABLET ORAL at 10:08

## 2023-08-17 RX ADMIN — LACTULOSE 30 G: 20 SOLUTION ORAL at 10:08

## 2023-08-17 RX ADMIN — DOCUSATE SODIUM AND SENNOSIDES 1 TABLET: 8.6; 5 TABLET, FILM COATED ORAL at 10:08

## 2023-08-17 RX ADMIN — DOCUSATE SODIUM AND SENNOSIDES 1 TABLET: 8.6; 5 TABLET, FILM COATED ORAL at 09:08

## 2023-08-17 RX ADMIN — PANTOPRAZOLE SODIUM 40 MG: 40 INJECTION, POWDER, LYOPHILIZED, FOR SOLUTION INTRAVENOUS at 10:08

## 2023-08-17 RX ADMIN — POTASSIUM CHLORIDE 40 MEQ: 1500 TABLET, EXTENDED RELEASE ORAL at 04:08

## 2023-08-17 RX ADMIN — RIFAXIMIN 550 MG: 550 TABLET ORAL at 09:08

## 2023-08-17 RX ADMIN — Medication 10 ML: at 02:08

## 2023-08-17 RX ADMIN — CEFTRIAXONE SODIUM 2 G: 2 INJECTION, POWDER, FOR SOLUTION INTRAMUSCULAR; INTRAVENOUS at 06:08

## 2023-08-17 RX ADMIN — LEVETIRACETAM 1000 MG: 500 SOLUTION ORAL at 09:08

## 2023-08-17 RX ADMIN — LITHIUM CARBONATE 300 MG: 150 CAPSULE, GELATIN COATED ORAL at 09:08

## 2023-08-17 RX ADMIN — HALOPERIDOL LACTATE 5 MG: 5 INJECTION, SOLUTION INTRAMUSCULAR at 03:08

## 2023-08-17 NOTE — ADDENDUM NOTE
Addendum  created 08/17/23 1423 by Lydia Reynaga MD    Attestation recorded in Intraprocedure, Intraprocedure Attestations filed       negative

## 2023-08-17 NOTE — PROGRESS NOTES
[facility] Medicine  Progress Note    Patient Name: Marely Hamilton  MRN: [id number]  Patient Class: IP- Inpatient   Admission Date: 8/14/2023  Length of Stay: 3 days  Attending Physician: Franci Stringer MD  Primary Care Provider: Viktor Ross MD        Subjective:     Principal Problem:Severe sepsis        HPI:  Marely Hamilton is a 57 y/o F with a PMH of anemia alcohol abuse, bipolar disorder depression, fatigue, history of seizure, osteoarthritis, thrombocytopenia brought in by EMS from [facility] with complaints of confusion with patient home O2 not in place, there is associated abdominal pain, chills and then 101.4 fever when she arrived in the ED.   Na 132, K 3.5, Bun/Cr 8/0.5, , wbc 3.21, H/H 9.0/28, PLT 58, INR 1.9, Lactate 2.4, Ca 7.7, Alb 1.9, CXR Nonspecific bibasilar opacities could relate to atelectasis, aspiration or pneumonia      Overview/Hospital Course:  No notes on file    Interval History:  Patient seen and examined.  Lying in bed in no acute distress.  Denies any complaints at all at this time.  States she feels better.  Id recommendations appreciated.   working on discharge planning    Review of Systems   Constitutional:  Negative for activity change, fatigue and fever.   Respiratory:  Negative for cough and shortness of breath.    Cardiovascular:  Negative for chest pain and leg swelling.   Gastrointestinal:  Negative for abdominal pain, nausea and vomiting.   All other systems reviewed and are negative.    Objective:     Vital Signs (Most Recent):  Temp: 98.9 °F (37.2 °C) (08/17/23 1134)  Pulse: 81 (08/17/23 1134)  Resp: 19 (08/17/23 1134)  BP: (!) 119/57 (08/17/23 1134)  SpO2: (!) 92 % (08/17/23 1203) Vital Signs (24h Range):  Temp:  [97.8 °F (36.6 °C)-100.7 °F (38.2 °C)] 98.9 °F (37.2 °C)  Pulse:  [] 81  Resp:  [18-24] 19  SpO2:  [91 %-95 %] 92 %  BP: (108-142)/(53-65) 119/57     Weight: 56 kg (123 lb 7.3 oz)  Body mass index is 22.58  kg/m².    Intake/Output Summary (Last 24 hours) at 8/17/2023 1523  Last data filed at 8/17/2023 1323  Gross per 24 hour   Intake 665 ml   Output 150 ml   Net 515 ml         Physical Exam  Constitutional:       General: She is not in acute distress.  HENT:      Head: Normocephalic.      Nose: Nose normal.      Mouth/Throat:      Mouth: Mucous membranes are moist.   Eyes:      Pupils: Pupils are equal, round, and reactive to light.   Cardiovascular:      Rate and Rhythm: Normal rate and regular rhythm.      Pulses: Normal pulses.      Heart sounds: Normal heart sounds.   Pulmonary:      Effort: Pulmonary effort is normal. No respiratory distress.      Breath sounds: Normal breath sounds.   Abdominal:      General: Bowel sounds are normal. There is distension.      Tenderness: There is no abdominal tenderness.   Musculoskeletal:      Right lower leg: Edema present.      Left lower leg: Edema present.   Skin:     General: Skin is warm.   Neurological:      General: No focal deficit present.      Mental Status: She is alert and oriented to person, place, and time.      Comments: Generalized weakness +   Psychiatric:         Mood and Affect: Mood normal.             Significant Labs: All pertinent labs within the past 24 hours have been reviewed.    Significant Imaging: I have reviewed all pertinent imaging results/findings within the past 24 hours.      Assessment/Plan:      * Severe sepsis  This patient does have evidence of infective focus  My overall impression is sepsis.  Source: Urinary Tract  Antibiotics given-   Antibiotics (72h ago, onward)    Start     Stop Route Frequency Ordered    08/17/23 0900  mupirocin 2 % ointment         08/22/23 0859 Nasl 2 times daily 08/17/23 0140    08/15/23 1830  cefTRIAXone (ROCEPHIN) 2 g in dextrose 5 % in water (D5W) 100 mL IVPB (MB+)         -- IV Every 24 hours (non-standard times) 08/15/23 1435    08/14/23 2100  rifAXIMin tablet 550 mg         -- Oral 2 times daily 08/14/23  1827        Latest lactate reviewed-  Recent Labs   Lab 08/15/23  1257   LACTATE 3.1*     Organ dysfunction indicated by Encephalopathy and Thrombocytopenia     Will Not start Pressors- Levophed for MAP of 65  Source control achieved by:  Rocephin IV    -ID consulted= appreciate input and follow recommendations  -echocardiogram on 08/16/2023:    Left Ventricle: The left ventricle is normal in size. Normal wall thickness. Normal wall motion. There is normal systolic function. Ejection fraction by visual approximation is 70%. There is normal diastolic function.    Left Atrium: Left atrium is mildly dilated.    Right Ventricle: Normal right ventricular cavity size. Wall thickness is normal. Right ventricle wall motion  is normal. Systolic function is normal.    Aortic Valve: There is mild aortic valve sclerosis. There is mild stenosis. Aortic valve area by VTI is 1.50 cm². Aortic valve peak velocity is 2.35 m/s. Mean gradient is 12 mmHg. The dimensionless index is 0.51.    IVC/SVC: Normal venous pressure at 3 mmHg.      -ID recommends ceftriaxone 2gm IV daily for total of 2 wks from 8/14 to 8/28/23     Gram-negative bacteremia        SBP (spontaneous bacterial peritonitis)  -unlikely SBP as no significant ascitic fluid seen  -SBP ruled out        UTI (urinary tract infection)  -afebrile   -no leukocytosis  -Lactate on admit= 2.4  -monitor lactic acidosis  -Blood cx on 08/14/2023= E coli.  Sensitive to Rocephin.  -blood PCR on 08/14/2023= E coli and Enterobacterales  -Urine culture on 08/14/2023= E coli.  Sensitive to Rocephin  -renal ultrasound per ID recommendations on 08/16/2023= pending  -Rocephin IV daily based on sensitivity to E colii.  Ordered on 08/14/2023  -ID consulted= appreciate input follow recommendations  -ID recommends ceftriaxone 2gm IV daily for total of 2 wks from 8/14 to 8/28/23   -ordered midline placement for IV antibiotics on 08/17/2023     consulted for discharge  planning        Pneumonia  -O2 sats mid 90s on 2 L nasal cannula   - chest x-ray on 08/14/2023=Nonspecific bibasilar opacities could relate to atelectasis, aspiration or pneumonia. Interstitial coarsening appears mostly chronic, noting that a superimposed degree of acute pulmonary vascular congestion/interstitial edema is not excluded. question small right pleural effusion.  -continue antibiotics        Thrombocytopenia  -Due to chronic liver disease  -monitor platelets= decrease  -consider transfusion of 1 unit platelets if platelet count less than 10,000       Edema  -bilateral lower extremity edema likely due to cirrhosis           Ascites  -consult IR for diagnostic paracentesis  -discussed case with patient and patient's sister Zaria on 08/15/2023 and they are both agreeable for paracentesis  -abdominal paracentesis on 09/15/2023 by IR= attempted but no significant ascites seen per radiology  -ascitic fluid culture and analysis on 08/15/2023= no significant fluid collected      AMS (altered mental status)  -resolved.  Patient is awake alert orient x3 currently  -Due to cirrhosis and hyperammonemia  -Improved.  Patient is awake alert and oriented x3 currently  -ammonia level on 08/14/2023= 55 (elevated)  -Continue Lactulose p.o. t.i.d.  -continue rifaximin      Gastrointestinal hemorrhage associated with duodenal ulcer  Continue PPI      Acute blood loss anemia  -no overt bleeding seen or reported   -monitor H&H= stable range but with decrease  -FOB T= pending   -PPI bid  -consider transfusion 1 unit PRBC if hemoglobin less than 7  -GI consulted= appreciate input and follow recommendations      Cirrhosis, Laennec's  Alcohol cirrhosis  Continue Lactulose  Rifaximin   Monitor electrolyte      Chronic deep vein thrombosis (DVT) of right lower extremity  -bilateral lower extremity venous ultrasound on 08/15/2023= Chronic DVT of the right common femoral and saphenofemoral junction. Bilateral inguinal ascites  -IVC  filter in place (patient had IVC filter placed on recent hospitalization )  -patient not a candidate for anticoagulation due to severe thrombocytopenia      Alcoholic cirrhosis        Acute encephalopathy  -resolved.  Patient is awake alert orient x3      Bipolar 1 disorder, depressed, severe  Bipolar disorder, manic  -stable   -On Zyprexa and Olanzapine- on hold due to confusion on admit  -Continue Lithium      History of seizure  -stable   -continue Keppra       VTE Risk Mitigation (From admission, onward)         Ordered     IP VTE HIGH RISK PATIENT  Once         08/14/23 1827     Place sequential compression device  Until discontinued         08/14/23 1827                Discharge Planning   BRAYAN: 8/18/2023     Code Status: Full Code   Is the patient medically ready for discharge?:     Reason for patient still in hospital (select all that apply): Treatment and Pending disposition  Discharge Plan A: Skilled Nursing Facility        Dc to SNF when accepted      Franci Stringer MD  Department of Hospital Medicine   [place] - Telemetry

## 2023-08-17 NOTE — SUBJECTIVE & OBJECTIVE
Interval History: pt comfortbale laying in bed, hungry eating lunch, no fevers, no N/V/D, denies any abdominal pain    Review of Systems   Constitutional:  Positive for activity change, appetite change and fatigue. Negative for chills.   HENT:  Negative for congestion, mouth sores and nosebleeds.    Eyes:  Negative for discharge and itching.   Respiratory:  Negative for apnea and chest tightness.    Cardiovascular:  Negative for chest pain.   Gastrointestinal:  Positive for abdominal distention and nausea. Negative for abdominal pain, diarrhea and vomiting.   Genitourinary:  Positive for dysuria. Negative for difficulty urinating.   Musculoskeletal:  Negative for arthralgias and joint swelling.   Skin:  Negative for wound.   Neurological:  Negative for dizziness.   Psychiatric/Behavioral:  Negative for agitation and behavioral problems.    All other systems reviewed and are negative.    Objective:     Vital Signs (Most Recent):  Temp: 98.9 °F (37.2 °C) (08/17/23 1134)  Pulse: 81 (08/17/23 1134)  Resp: 19 (08/17/23 1134)  BP: (!) 119/57 (08/17/23 1134)  SpO2: (!) 92 % (08/17/23 1203) Vital Signs (24h Range):  Temp:  [97.8 °F (36.6 °C)-100.7 °F (38.2 °C)] 98.9 °F (37.2 °C)  Pulse:  [] 81  Resp:  [18-24] 19  SpO2:  [91 %-95 %] 92 %  BP: (108-142)/(53-65) 119/57     Weight: 56 kg (123 lb 7.3 oz)  Body mass index is 22.58 kg/m².    Estimated Creatinine Clearance: 81.8 mL/min (based on SCr of 0.6 mg/dL).     Physical Exam  Vitals and nursing note reviewed.   Constitutional:       General: She is not in acute distress.     Appearance: She is ill-appearing. She is not toxic-appearing.      Comments: Thin lean with temporal wasting, pale looking   HENT:      Head: Atraumatic.      Nose: Nose normal. No rhinorrhea.      Mouth/Throat:      Mouth: Mucous membranes are moist.      Pharynx: No posterior oropharyngeal erythema.   Eyes:      General:         Right eye: No discharge.         Left eye: No discharge.       Extraocular Movements: Extraocular movements intact.   Cardiovascular:      Rate and Rhythm: Normal rate and regular rhythm.      Heart sounds: Murmur heard.   Pulmonary:      Effort: Pulmonary effort is normal. No respiratory distress.      Breath sounds: Normal breath sounds. No wheezing.   Abdominal:      General: There is distension.      Palpations: Abdomen is soft.      Tenderness: There is no abdominal tenderness.   Musculoskeletal:         General: Swelling present. No tenderness.      Cervical back: Normal range of motion and neck supple.      Right lower leg: Edema present.      Left lower leg: Edema present.   Skin:     General: Skin is warm.      Findings: No erythema or rash.   Neurological:      General: No focal deficit present.      Mental Status: She is alert and oriented to person, place, and time.   Psychiatric:         Mood and Affect: Mood normal.         Behavior: Behavior normal.          Significant Labs: CBC:   Recent Labs   Lab 08/16/23 0447 08/17/23 0333   WBC 4.46 5.07   HGB 7.9* 9.3*   HCT 24.8* 27.6*   PLT 52* 65*     CMP:   Recent Labs   Lab 08/16/23 0447 08/17/23 0333   * 130*   K 3.3* 3.3*   * 106   CO2 15* 12*   * 146*   BUN 7 7   CREATININE 0.6 0.6   CALCIUM 7.4* 7.6*   PROT 5.1* 6.1   ALBUMIN 1.7* 2.0*   BILITOT 2.5* 4.2*   ALKPHOS 117 168*   AST 33 54*   ALT 12 21   ANIONGAP 8 12     Microbiology Results (last 7 days)       Procedure Component Value Units Date/Time    Blood culture x two cultures. Draw prior to antibiotics. [[id number]]  (Abnormal) Collected: 08/14/23 1019    Order Status: Completed Specimen: Blood Updated: 08/17/23 0917     Blood Culture, Routine Gram stain aer bottle: Gram negative rods      Gram stain enoc bottle: Gram negative rods      Results called to and read back by:Marsha Messina Rn 08/15/2023 00:50      ESCHERICHIA COLI  For susceptibility see order #[id number]      Narrative:      Aerobic and anaerobic    Blood culture x two  cultures. Draw prior to antibiotics. [[id number]]  (Abnormal)  (Susceptibility) Collected: 08/14/23 0954    Order Status: Completed Specimen: Blood from Peripheral, Jugular, External Left Updated: 08/17/23 0917     Blood Culture, Routine Gram stain aer bottle: Gram negative rods      Gram stain enoc bottle: Gram negative rods      Positive results previously called 08/15/2023      ESCHERICHIA COLI    Narrative:      Aerobic and anaerobic    Urine culture [[id number]]  (Abnormal)  (Susceptibility) Collected: 08/14/23 0846    Order Status: Completed Specimen: Urine Updated: 08/16/23 0956     Urine Culture, Routine ESCHERICHIA COLI  >100,000 cfu/ml      Narrative:      Specimen Source->Urine    Rapid Organism ID by PCR (from Blood culture) [[id number]]  (Abnormal) Collected: 08/14/23 1019    Order Status: Completed Updated: 08/15/23 0117     Enterococcus faecalis Not Detected     Enterococcus faecium Not Detected     Listeria monocytogenes Not Detected     Staphylococcus spp. Not Detected     Staphylococcus aureus Not Detected     Staphylococcus epidermidis Not Detected     Staphylococcus lugdunensis Not Detected     Streptococcus species Not Detected     Streptococcus agalactiae Not Detected     Streptococcus pneumoniae Not Detected     Streptococcus pyogenes Not Detected     Acinetobacter calcoaceticus/baumannii complex Not Detected     Bacteroides fragilis Not Detected     Enterobacterales See species for ID     Enterobacter cloacae complex Not Detected     Escherichia coli Detected     Klebsiella aerogenes Not Detected     Klebsiella oxytoca Not Detected     Klebsiella pneumoniae group Not Detected     Proteus Not Detected     Salmonella sp Not Detected     Serratia marcescens Not Detected     Haemophilus influenzae Not Detected     Neisseria meningtidis Not Detected     Pseudomonas aeruginosa Not Detected     Stenotrophomonas maltophilia Not Detected     Candida albicans Not Detected     Candida auris Not Detected      Mandi glabrata Not Detected     Candida krusei Not Detected     Candida parapsilosis Not Detected     Candida tropicalis Not Detected     Cryptococcus neoformans/gattii Not Detected     CTX-M (ESBL ) Not Detected     IMP (Carbapenem resistant) Not Detected     KPC resistance gene (Carbapenem resistant) Not Detected     mcr-1  Not Detected     mec A/C  Not Detected     mec A/C and MREJ (MRSA) gene Not Detected     NDM (Carbapenem resistant) Not Detected     OXA-48-like (Carbapenem resistant) Not Detected     van A/B (VRE gene) Not Detected     VIM (Carbapenem resistant) Not Detected    Narrative:      Aerobic and anaerobic    Culture, Body Fluid - Bactec [[id number]]     Order Status: No result Specimen: Body Fluid from Peritoneal Fluid     Gram stain [[id number]]     Order Status: No result Specimen: Body Fluid from Peritoneal Fluid     Influenza A & B by Molecular [[id number]] Collected: 08/14/23 0849    Order Status: Completed Specimen: Nasopharyngeal Swab Updated: 08/14/23 0932     Influenza A, Molecular Negative     Influenza B, Molecular Negative     Flu A & B Source Nasal swab          All pertinent labs within the past 24 hours have been reviewed.    Significant Imaging: I have reviewed all pertinent imaging results/findings within the past 24 hours.

## 2023-08-17 NOTE — PT/OT/SLP PROGRESS
Physical Therapy      Patient Name:  Marely Hamilton   MRN:  [id number]    Patient not seen today secondary to Other (Comment) (midline catheter being placed). Will follow-up as able.  8/17/2023

## 2023-08-17 NOTE — PLAN OF CARE
Problem: Adult Inpatient Plan of Care  Goal: Plan of Care Review  Outcome: Ongoing, Progressing  Goal: Patient-Specific Goal (Individualized)  Outcome: Ongoing, Progressing  Goal: Absence of Hospital-Acquired Illness or Injury  Outcome: Ongoing, Progressing  Goal: Optimal Comfort and Wellbeing  Outcome: Ongoing, Progressing  Goal: Readiness for Transition of Care  Outcome: Ongoing, Progressing     Problem: Fluid and Electrolyte Imbalance (Acute Kidney Injury/Impairment)  Goal: Fluid and Electrolyte Balance  Outcome: Ongoing, Progressing     Problem: Skin Injury Risk Increased  Goal: Skin Health and Integrity  Outcome: Ongoing, Progressing     Problem: Alcohol Withdrawal  Goal: Alcohol Withdrawal Symptom Control  Outcome: Ongoing, Not Progressing     Problem: Behavior Regulation Impairment (Psychotic Signs/Symptoms)  Goal: Improved Behavioral Control (Psychotic Signs/Symptoms)  Outcome: Ongoing, Not Progressing

## 2023-08-17 NOTE — ASSESSMENT & PLAN NOTE
This patient does have evidence of infective focus  My overall impression is sepsis.  Source: Urinary Tract  Antibiotics given-   Antibiotics (72h ago, onward)    Start     Stop Route Frequency Ordered    08/17/23 0900  mupirocin 2 % ointment         08/22/23 0859 Nasl 2 times daily 08/17/23 0140    08/15/23 1830  cefTRIAXone (ROCEPHIN) 2 g in dextrose 5 % in water (D5W) 100 mL IVPB (MB+)         -- IV Every 24 hours (non-standard times) 08/15/23 1435    08/14/23 2100  rifAXIMin tablet 550 mg         -- Oral 2 times daily 08/14/23 1827        Latest lactate reviewed-  Recent Labs   Lab 08/15/23  1257   LACTATE 3.1*     Organ dysfunction indicated by Encephalopathy and Thrombocytopenia     Will Not start Pressors- Levophed for MAP of 65  Source control achieved by:  Rocephin IV    -ID consulted= appreciate input and follow recommendations  -echocardiogram on 08/16/2023:    Left Ventricle: The left ventricle is normal in size. Normal wall thickness. Normal wall motion. There is normal systolic function. Ejection fraction by visual approximation is 70%. There is normal diastolic function.    Left Atrium: Left atrium is mildly dilated.    Right Ventricle: Normal right ventricular cavity size. Wall thickness is normal. Right ventricle wall motion  is normal. Systolic function is normal.    Aortic Valve: There is mild aortic valve sclerosis. There is mild stenosis. Aortic valve area by VTI is 1.50 cm². Aortic valve peak velocity is 2.35 m/s. Mean gradient is 12 mmHg. The dimensionless index is 0.51.    IVC/SVC: Normal venous pressure at 3 mmHg.      -ID recommends ceftriaxone 2gm IV daily for total of 2 wks from 8/14 to 8/28/23

## 2023-08-17 NOTE — PT/OT/SLP PROGRESS
Occupational Therapy      Patient Name:  Marely Hamilton   MRN:  [id number]    Patient not seen today secondary to Other (Comment) (Pt having midline placed). Will follow-up next tx date.    8/17/2023

## 2023-08-17 NOTE — NURSING
RN secure germania Patton NP regarding patient severe tremors, dyspnea at rest and hostility.     2042: RN messaged on call provider to inform patient is currently reporting SOB with 02 SATs 90%.    0131: RN secure germania Schmitt NP regarding patient increase anxiety, hostility and disruptive behavior. RN will continue plan of care.    0135: RN received medication orders, and RN will administer when verified.    0305: RN administered a one dose of Haldol to treat tremors, and severe anxiety.     0327: RN noticed patient IV hanging out of left side of neck. RN inserted new IV line, and patient tolerated well.    End Shift: Patient has pulled off telemetry monitor throughout the, and removed IV.

## 2023-08-17 NOTE — ASSESSMENT & PLAN NOTE
59 y/o F with a PMH of anemia,  alcohol abuse with advanced liver disease with alcoholic cirrhosis (was being considered a candidate for LT at one point 2015), bipolar disorder depression, history of seizure, osteoarthritis, thrombocytopenia brought in by EMS from [facility] on [facility]/14/23 with complaints of worsening confusion worse than baseline with abdominal pain, chills and then 101.4 fever when she arrived in the ED.   Na 132, K 3.5, Bun/Cr 8/0.5, , wbc 3.21, H/H 9.0/28, PLT 58, INR 1.9, Lactate elevated 2.4, Ca 7.7, Alb 1.9, CXR Nonspecific bibasilar opacities could relate to atelectasis, aspiration or pneumonia. UA with pyuria with WBC above 80 in UA   Pt was admitted for possible PNA, UTI and SBP. GI and IR consulted. IR attempted paracentesis 8/15/23 but not enough ascitic fluid. b/l LE venous doppler with chronic DVT  Later Ucx with ecoli R to cipro and levo and blood cx 4/4 bottles 8/14/23 with GNR/Ecoli per BCID and cx with Ecoli also with sensitivities are back  Recommendations:  -Continue ceftriaxone 2gm IV daily for total of 2 wks from 8/14 to 8/28/23   Weekly labs can be done CBC, CMP can be faxed to Dr Giraldo at [phone]  -Blood cx with Ecoli also sensitive to CFTX  -Renal US normal and TTE results noted no vegetations  Follow up in clinic with GI and PCP. No need of ID clinic follow up right now  ID will sign off

## 2023-08-17 NOTE — PT/OT/SLP EVAL
Physical Therapy Evaluation    Patient Name:  Marely Hamilton   MRN:  [id number]    Recommendations:     Discharge Recommendations:  (moderate intensity therapy)   Discharge Equipment Recommendations: to be determined by next level of care   Barriers to discharge:  increased level of assistance required    Assessment:     Marely Hamilton is a 58 y.o. female admitted with a medical diagnosis of Severe sepsis.  She presents with the following impairments/functional limitations: weakness, impaired endurance, impaired functional mobility, impaired self care skills, impaired cognition, decreased safety awareness, decreased coordination, decreased lower extremity function, decreased upper extremity function, impaired balance, gait instability, pain, decreased ROM, edema Patient evaluated to patient tolerance with OT; pt A&O x 3; pt c/o pain to buttocks; pt able to sit EOB with SBA and O2 sats 92%  /57; spoke with nurse about obtaining waffle mattress; pt would benefit from cont PT services in order to maximize functional independence; recommend moderate intensity therapy upon discharge from hospital; will progress as tolerated..    Rehab Prognosis: Fair; patient would benefit from acute skilled PT services to address these deficits and reach maximum level of function.    Recent Surgery: * No surgery found *      Plan:     During this hospitalization, patient to be seen 3 x/week to address the identified rehab impairments via gait training, therapeutic activities, therapeutic exercises, neuromuscular re-education and progress toward the following goals:    Plan of Care Expires:  09/16/23    Subjective     Chief Complaint: could I have some ice water  Patient/Family Comments/goals: I do the arm bike x 15 min and the leg bike for 15 min  Pain/Comfort:  Pain Rating 1: 3/10  Location 1:  (buttocks)  Pain Addressed 1: Reposition, Distraction, Cessation of Activity, Nurse notified    Patients cultural, spiritual,  Sikh conflicts given the current situation: no    Living Environment:  Patient admitted to hospital from SNF at [facility]  Prior to admission, patients level of function was assisted with bed mobility and transfers with min/modA, has not been able to walk as yet; assisted with bathing, dressing; able to feed self and groom.  Equipment used at home: wheelchair.  DME per SNF  Upon discharge, patient will have assistance from NH staff     .    Objective:     Communicated with nurse prior to session.  Patient found HOB elevated with bed alarm, baugh catheter, telemetry  upon PT entry to room.    General Precautions: Standard, fall  Orthopedic Precautions:N/A   Braces: N/A  Respiratory Status: Room air    Exams:  Cognitive Exam:  Patient is oriented to Person, Place, Time, Situation, and follows one and two step commands  Gross Motor Coordination:  slow movement 2/2 weakness  Postural Exam:  Patient presented with the following abnormalities:    -       Rounded shoulders  -       Forward head  Skin Integrity/Edema:      -       Skin integrity: Bruising of L buttock, abrasion mid thoracic, L lateral distal leg; thin, fragile  -       Edema: moderate to severe pitting edema B feet R>L, mild to mod pitting edema B legs, moderate edema L flank, minimal edema R flank  BLE ROM: ROM 50% df<>pf, remaining WFL passively  BLE Strength: df/pf~2+, knee ext~3+ to 4-, knee fl~2-, hip fl R~3-, hip fl L~2+, hip abd/add~2-    Functional Mobility:  Bed Mobility:     Scooting: maximal assistance, of 2 persons, and laterally along EOB with use of draw sheet  Supine to Sit: moderate assistance and of 2 persons  Sit to Supine: moderate assistance and of 2 persons      AM-PAC 6 CLICK MOBILITY  Total Score:9       Treatment & Education:  Patient educated on role of PT/POC; co-eval with OT in anticipation of lack of activity tolerance and increased assistance needed; pt with increased assist with bed mobility, increased  time/effort and VCs for technique as described above; pt sat at EOB with SBA and performed AROM ex in supine/seated with assistance; EOB O2 92%, , /57; pt c/o pain in buttocks; returned to supine with HOB elevated; spoke with nurse about placing mattress overlay.    Patient left HOB elevated with all lines intact, call button in reach, bed alarm on, and nurse notified.    GOALS:   Multidisciplinary Problems       Physical Therapy Goals          Problem: Physical Therapy    Goal Priority Disciplines Outcome Goal Variances Interventions   Physical Therapy Goal     PT, PT/OT Ongoing, Progressing     Description: Goals to be met by: 2023     Patient will increase functional independence with mobility by performin. Supine to sit with MInimal Assistance  2. Sit to supine with MInimal Assistance  3. Sit to stand transfer with Moderate Assistance using Rolling Walker  4. Bed to chair transfer with Moderate Assistance using Rolling Walker  5. Gait  x 20 feet with Moderate Assistance using Rolling Walker.   6. Stand for 3 minutes with Minimal Assistance and Moderate Assistance using Rolling Walker  7. Lower extremity exercise program x10 reps with assistance as needed                         History:     Past Medical History:   Diagnosis Date    Addiction to drug     Alcohol abuse     Alcohol abuse, in remission 6/15/2015    14.5 weeks ago; AA weekly    Anemia     Anxiety 6/15/2015    Behavioral problem     Bipolar disorder     Bipolar disorder in remission 6/15/2015    Cirrhosis, Laennec's 6/15/2015    Depression     Encounter for blood transfusion     Epistaxis 6/15/2015    Fatigue     Glaucoma     Hematuria     Hepatic encephalopathy 6/15/2015    Hepatic enlargement     History of psychiatric care     History of psychiatric hospitalization     History of seizure 6/15/2015    1    hx of intentional Tylenol overdose 6/15/2015    2005- situational and hx of bipolar    Hx of psychiatric care      Macrocytic anemia 9/18/2015    6 units PRBC since June 2015    Jeana     Osteoarthritis     Other ascites 6/15/2015    Psychiatric exam requested by authority     Psychiatric problem     Psychosis 9/26/2019    Renal disorder     Seizures     Self-harming behavior     Suicide attempt     Therapy     Thrombocytopenia 6/15/2015       Past Surgical History:   Procedure Laterality Date    COSMETIC SURGERY      EMBOLIZATION N/A 6/11/2023    Procedure: EMBOLIZATION, BLOOD VESSEL;  Surgeon: Debbie Surgeon;  Location: [facility];  Service: Anesthesiology;  Laterality: N/A;    ESOPHAGOGASTRODUODENOSCOPY      ESOPHAGOGASTRODUODENOSCOPY N/A 7/6/2023    Procedure: EGD (ESOPHAGOGASTRODUODENOSCOPY);  Surgeon: Bernice Guillen MD;  Location: [facility] ([address] [place] [address]);  Service: Endoscopy;  Laterality: N/A;    ESOPHAGOGASTRODUODENOSCOPY N/A 7/11/2023    Procedure: EGD (ESOPHAGOGASTRODUODENOSCOPY);  Surgeon: Rangel Broussard MD;  Location: [facility] ([address] [place] [address]);  Service: Endoscopy;  Laterality: N/A;       Time Tracking:     PT Received On: 08/16/23  PT Start Time: 1025     PT Stop Time: 1056  PT Total Time (min): 31 min     Billable Minutes: Evaluation 8, Therapeutic Activity 12, and Therapeutic Exercise 11      08/16/2023

## 2023-08-17 NOTE — ASSESSMENT & PLAN NOTE
-consult IR for diagnostic paracentesis  -discussed case with patient and patient's sister Zaria on 08/15/2023 and they are both agreeable for paracentesis  -abdominal paracentesis on 09/15/2023 by IR= attempted but no significant ascites seen per radiology  -ascitic fluid culture and analysis on 08/15/2023= no significant fluid collected

## 2023-08-17 NOTE — NURSING
0900- Pt reoriented to room and medical equipment. Tele box placed on patient at this time.     1015-Pt resting in bed with tele box wires in hand and box in water pitcher. Attempted to dry telebox and replace. Box appears to not turn on at this time. MD notified. Awaiting new orders.

## 2023-08-17 NOTE — ASSESSMENT & PLAN NOTE
-afebrile   -no leukocytosis  -Lactate on admit= 2.4  -monitor lactic acidosis  -Blood cx on 08/14/2023= E coli.  Sensitive to Rocephin.  -blood PCR on 08/14/2023= E coli and Enterobacterales  -Urine culture on 08/14/2023= E coli.  Sensitive to Rocephin  -renal ultrasound per ID recommendations on 08/16/2023= pending  -Rocephin IV daily based on sensitivity to E colii.  Ordered on 08/14/2023  -ID consulted= appreciate input follow recommendations  -ID recommends ceftriaxone 2gm IV daily for total of 2 wks from 8/14 to 8/28/23   -ordered midline placement for IV antibiotics on 08/17/2023     consulted for discharge planning

## 2023-08-17 NOTE — PLAN OF CARE
1115  MARINA was informed by Dr Stringer that the pt will need ceftriazone 2G IV QD x2 weeks following discharge. Awaiting order for midline placement.     1245  Updated notes sent to [facility]. CM informed SNF of above & requested that ins auth be submitted. Awaiting response.    1325  CM received confirmation from Rere ([phone]) w/[facility] that they will submit for insurance authorization for the pt's return to SNF.    1335  CM informed the pt's sister, Zaria Hamilton ([phone]), via phone of the discharge status. Zaria verbalized understanding & agreement.     1425  Pt resting quietly in bed when CM rounded. No family at the bedside. CM informed the pt of above. Pt verbalized understanding.     1540  CM was informed by Ambreen w/[facility] that ins auth has been obtained. Awaiting midline placement. Message sent to Dr Stringer informing of above & requested SNF orders early tomorrow.       Will continue to follow.

## 2023-08-17 NOTE — ASSESSMENT & PLAN NOTE
-stable   -continue Keppra    Azelaic Acid Counseling: Patient counseled that medicine may cause skin irritation and to avoid applying near the eyes.  In the event of skin irritation, the patient was advised to reduce the amount of the drug applied or use it less frequently.   The patient verbalized understanding of the proper use and possible adverse effects of azelaic acid.  All of the patient's questions and concerns were addressed.

## 2023-08-17 NOTE — PROGRESS NOTES
[facility] - Telemetry  Infectious Disease  Progress Note    Patient Name: Marely Hamilton  MRN: [id number]  Admission Date: 8/14/2023  Length of Stay: 3 days  Attending Physician: Franci Stringer MD  Primary Care Provider: Viktor Ross MD    Isolation Status: No active isolations  Assessment/Plan:      Neuro  AMS (altered mental status)  See GNR bacteremia  Improving    Pulmonary  Pneumonia  See GNR bacteremia/Ecoli    Renal/  UTI (urinary tract infection)  Urine cx with Ecoli  Sensitive to all except cipro and levo  Blood cx Ecoli also senstive to CFTX    ID  Gram-negative bacteremia  59 y/o F with a PMH of anemia,  alcohol abuse with advanced liver disease with alcoholic cirrhosis (was being considered a candidate for LT at one point 2015), bipolar disorder depression, history of seizure, osteoarthritis, thrombocytopenia brought in by EMS from [facility] on 8/14/23 with complaints of worsening confusion worse than baseline with abdominal pain, chills and then 101.4 fever when she arrived in the ED.   Na 132, K 3.5, Bun/Cr 8/0.5, , wbc 3.21, H/H 9.0/28, PLT 58, INR 1.9, Lactate elevated 2.4, Ca 7.7, Alb 1.9, CXR Nonspecific bibasilar opacities could relate to atelectasis, aspiration or pneumonia. UA with pyuria with WBC above 80 in UA   Pt was admitted for possible PNA, UTI and SBP. GI and IR consulted. IR attempted paracentesis 8/15/23 but not enough ascitic fluid. b/l LE venous doppler with chronic DVT  Later Ucx with ecoli R to cipro and levo and blood cx 4/4 bottles 8/14/23 with GNR/Ecoli per BCID and cx with Ecoli also with sensitivities are back  Recommendations:  -Continue ceftriaxone 2gm IV daily for total of 2 wks from 8/14 to 8/28/23   Weekly labs can be done CBC, CMP can be faxed to Dr Giraldo at [phone]  -Blood cx with Ecoli also sensitive to CFTX  -Renal US normal and TTE results noted no vegetations  Follow up in clinic with GI and PCP. No need of ID clinic follow up right now  ID will  sign off    GI  Ascites  See bacteremia. Agree with GI unlikely SBP        Thank you for your consult. I will sign off. Please contact us if you have any additional questions.    Sukh Giraldo MD  Infectious Diseases  [place] [facility] - Telemetry    Subjective:     Principal Problem:Severe sepsis    HPI:  57 y/o F with a PMH of anemia,  alcohol abuse with advanced liver disease with alcoholic cirrhosis (was being considered a candidate for LT at one point 2015), bipolar disorder depression, history of seizure, osteoarthritis, thrombocytopenia brought in by EMS from [facility] on 8/14/23 with complaints of worsening confusion worse than baseline with abdominal pain, chills and then 101.4 fever when she arrived in the ED.   Na 132, K 3.5, Bun/Cr 8/0.5, , wbc 3.21, H/H 9.0/28, PLT 58, INR 1.9, Lactate elevated 2.4, Ca 7.7, Alb 1.9, CXR Nonspecific bibasilar opacities could relate to atelectasis, aspiration or pneumonia. UA with pyuria with WBC above 80 in UA   Pt was admitted for possible PNA, UTI and SBP. GI and IR consulted. IR attempted paracentesis 8/15/23 but not enough ascitic fluid, b/l LE venous doppler with chronic DVT  Later Ucx with ecoli and blood cx 4/4 bottles 8/14/23 with GNR    8/17 = admit blood cx also now Ecoli with sens to CFTX    Interval History: pt comfortbale laying in bed, hungry eating lunch, no fevers, no N/V/D, denies any abdominal pain    Review of Systems   Constitutional:  Positive for activity change, appetite change and fatigue. Negative for chills.   HENT:  Negative for congestion, mouth sores and nosebleeds.    Eyes:  Negative for discharge and itching.   Respiratory:  Negative for apnea and chest tightness.    Cardiovascular:  Negative for chest pain.   Gastrointestinal:  Positive for abdominal distention and nausea. Negative for abdominal pain, diarrhea and vomiting.   Genitourinary:  Positive for dysuria. Negative for difficulty urinating.   Musculoskeletal:  Negative for  arthralgias and joint swelling.   Skin:  Negative for wound.   Neurological:  Negative for dizziness.   Psychiatric/Behavioral:  Negative for agitation and behavioral problems.    All other systems reviewed and are negative.    Objective:     Vital Signs (Most Recent):  Temp: 98.9 °F (37.2 °C) (08/17/23 1134)  Pulse: 81 (08/17/23 1134)  Resp: 19 (08/17/23 1134)  BP: (!) 119/57 (08/17/23 1134)  SpO2: (!) 92 % (08/17/23 1203) Vital Signs (24h Range):  Temp:  [97.8 °F (36.6 °C)-100.7 °F (38.2 °C)] 98.9 °F (37.2 °C)  Pulse:  [] 81  Resp:  [18-24] 19  SpO2:  [91 %-95 %] 92 %  BP: (108-142)/(53-65) 119/57     Weight: 56 kg (123 lb 7.3 oz)  Body mass index is 22.58 kg/m².    Estimated Creatinine Clearance: 81.8 mL/min (based on SCr of 0.6 mg/dL).     Physical Exam  Vitals and nursing note reviewed.   Constitutional:       General: She is not in acute distress.     Appearance: She is ill-appearing. She is not toxic-appearing.      Comments: Thin lean with temporal wasting, pale looking   HENT:      Head: Atraumatic.      Nose: Nose normal. No rhinorrhea.      Mouth/Throat:      Mouth: Mucous membranes are moist.      Pharynx: No posterior oropharyngeal erythema.   Eyes:      General:         Right eye: No discharge.         Left eye: No discharge.      Extraocular Movements: Extraocular movements intact.   Cardiovascular:      Rate and Rhythm: Normal rate and regular rhythm.      Heart sounds: Murmur heard.   Pulmonary:      Effort: Pulmonary effort is normal. No respiratory distress.      Breath sounds: Normal breath sounds. No wheezing.   Abdominal:      General: There is distension.      Palpations: Abdomen is soft.      Tenderness: There is no abdominal tenderness.   Musculoskeletal:         General: Swelling present. No tenderness.      Cervical back: Normal range of motion and neck supple.      Right lower leg: Edema present.      Left lower leg: Edema present.   Skin:     General: Skin is warm.      Findings:  No erythema or rash.   Neurological:      General: No focal deficit present.      Mental Status: She is alert and oriented to person, place, and time.   Psychiatric:         Mood and Affect: Mood normal.         Behavior: Behavior normal.          Significant Labs: CBC:   Recent Labs   Lab 08/16/23 0447 08/17/23 0333   WBC 4.46 5.07   HGB 7.9* 9.3*   HCT 24.8* 27.6*   PLT 52* 65*     CMP:   Recent Labs   Lab 08/16/23 0447 08/17/23 0333   * 130*   K 3.3* 3.3*   * 106   CO2 15* 12*   * 146*   BUN 7 7   CREATININE 0.6 0.6   CALCIUM 7.4* 7.6*   PROT 5.1* 6.1   ALBUMIN 1.7* 2.0*   BILITOT 2.5* 4.2*   ALKPHOS 117 168*   AST 33 54*   ALT 12 21   ANIONGAP 8 12     Microbiology Results (last 7 days)       Procedure Component Value Units Date/Time    Blood culture x two cultures. Draw prior to antibiotics. [[id number]]  (Abnormal) Collected: 08/14/23 1019    Order Status: Completed Specimen: Blood Updated: 08/17/23 0917     Blood Culture, Routine Gram stain aer bottle: Gram negative rods      Gram stain enoc bottle: Gram negative rods      Results called to and read back by:Marsha Messina Rn 08/15/2023 00:50      ESCHERICHIA COLI  For susceptibility see order #[id number]      Narrative:      Aerobic and anaerobic    Blood culture x two cultures. Draw prior to antibiotics. [[id number]]  (Abnormal)  (Susceptibility) Collected: 08/14/23 0954    Order Status: Completed Specimen: Blood from Peripheral, Jugular, External Left Updated: 08/17/23 0917     Blood Culture, Routine Gram stain aer bottle: Gram negative rods      Gram stain enoc bottle: Gram negative rods      Positive results previously called 08/15/2023      ESCHERICHIA COLI    Narrative:      Aerobic and anaerobic    Urine culture [[id number]]  (Abnormal)  (Susceptibility) Collected: 08/14/23 0846    Order Status: Completed Specimen: Urine Updated: 08/16/23 0956     Urine Culture, Routine ESCHERICHIA COLI  >100,000 cfu/ml      Narrative:       Specimen Source->Urine    Rapid Organism ID by PCR (from Blood culture) [[id number]]  (Abnormal) Collected: 08/14/23 1019    Order Status: Completed Updated: 08/15/23 0117     Enterococcus faecalis Not Detected     Enterococcus faecium Not Detected     Listeria monocytogenes Not Detected     Staphylococcus spp. Not Detected     Staphylococcus aureus Not Detected     Staphylococcus epidermidis Not Detected     Staphylococcus lugdunensis Not Detected     Streptococcus species Not Detected     Streptococcus agalactiae Not Detected     Streptococcus pneumoniae Not Detected     Streptococcus pyogenes Not Detected     Acinetobacter calcoaceticus/baumannii complex Not Detected     Bacteroides fragilis Not Detected     Enterobacterales See species for ID     Enterobacter cloacae complex Not Detected     Escherichia coli Detected     Klebsiella aerogenes Not Detected     Klebsiella oxytoca Not Detected     Klebsiella pneumoniae group Not Detected     Proteus Not Detected     Salmonella sp Not Detected     Serratia marcescens Not Detected     Haemophilus influenzae Not Detected     Neisseria meningtidis Not Detected     Pseudomonas aeruginosa Not Detected     Stenotrophomonas maltophilia Not Detected     Candida albicans Not Detected     Candida auris Not Detected     Candida glabrata Not Detected     Candida krusei Not Detected     Candida parapsilosis Not Detected     Candida tropicalis Not Detected     Cryptococcus neoformans/gattii Not Detected     CTX-M (ESBL ) Not Detected     IMP (Carbapenem resistant) Not Detected     KPC resistance gene (Carbapenem resistant) Not Detected     mcr-1  Not Detected     mec A/C  Not Detected     mec A/C and MREJ (MRSA) gene Not Detected     NDM (Carbapenem resistant) Not Detected     OXA-48-like (Carbapenem resistant) Not Detected     van A/B (VRE gene) Not Detected     VIM (Carbapenem resistant) Not Detected    Narrative:      Aerobic and anaerobic    Culture, Body Fluid - Bactec  [[id number]]     Order Status: No result Specimen: Body Fluid from Peritoneal Fluid     Gram stain [[id number]]     Order Status: No result Specimen: Body Fluid from Peritoneal Fluid     Influenza A & B by Molecular [[id number]] Collected: 08/14/23 0849    Order Status: Completed Specimen: Nasopharyngeal Swab Updated: 08/14/23 0932     Influenza A, Molecular Negative     Influenza B, Molecular Negative     Flu A & B Source Nasal swab          All pertinent labs within the past 24 hours have been reviewed.    Significant Imaging: I have reviewed all pertinent imaging results/findings within the past 24 hours.

## 2023-08-17 NOTE — SUBJECTIVE & OBJECTIVE
Interval History:  Patient seen and examined.  Lying in bed in no acute distress.  Denies any complaints at all at this time.  States she feels better.  Id recommendations appreciated.   working on discharge planning    Review of Systems   Constitutional:  Negative for activity change, fatigue and fever.   Respiratory:  Negative for cough and shortness of breath.    Cardiovascular:  Negative for chest pain and leg swelling.   Gastrointestinal:  Negative for abdominal pain, nausea and vomiting.   All other systems reviewed and are negative.    Objective:     Vital Signs (Most Recent):  Temp: 98.9 °F (37.2 °C) (08/17/23 1134)  Pulse: 81 (08/17/23 1134)  Resp: 19 (08/17/23 1134)  BP: (!) 119/57 (08/17/23 1134)  SpO2: (!) 92 % (08/17/23 1203) Vital Signs (24h Range):  Temp:  [97.8 °F (36.6 °C)-100.7 °F (38.2 °C)] 98.9 °F (37.2 °C)  Pulse:  [] 81  Resp:  [18-24] 19  SpO2:  [91 %-95 %] 92 %  BP: (108-142)/(53-65) 119/57     Weight: 56 kg (123 lb 7.3 oz)  Body mass index is 22.58 kg/m².    Intake/Output Summary (Last 24 hours) at 8/17/2023 1523  Last data filed at 8/17/2023 1323  Gross per 24 hour   Intake 665 ml   Output 150 ml   Net 515 ml         Physical Exam  Constitutional:       General: She is not in acute distress.  HENT:      Head: Normocephalic.      Nose: Nose normal.      Mouth/Throat:      Mouth: Mucous membranes are moist.   Eyes:      Pupils: Pupils are equal, round, and reactive to light.   Cardiovascular:      Rate and Rhythm: Normal rate and regular rhythm.      Pulses: Normal pulses.      Heart sounds: Normal heart sounds.   Pulmonary:      Effort: Pulmonary effort is normal. No respiratory distress.      Breath sounds: Normal breath sounds.   Abdominal:      General: Bowel sounds are normal. There is distension.      Tenderness: There is no abdominal tenderness.   Musculoskeletal:      Right lower leg: Edema present.      Left lower leg: Edema present.   Skin:     General: Skin is  warm.   Neurological:      General: No focal deficit present.      Mental Status: She is alert and oriented to person, place, and time.      Comments: Generalized weakness +   Psychiatric:         Mood and Affect: Mood normal.             Significant Labs: All pertinent labs within the past 24 hours have been reviewed.    Significant Imaging: I have reviewed all pertinent imaging results/findings within the past 24 hours.

## 2023-08-17 NOTE — PLAN OF CARE
Problem: Adult Inpatient Plan of Care  Goal: Plan of Care Review  Outcome: Ongoing, Progressing  Goal: Patient-Specific Goal (Individualized)  Outcome: Ongoing, Progressing     Problem: Fluid and Electrolyte Imbalance (Acute Kidney Injury/Impairment)  Goal: Fluid and Electrolyte Balance  Outcome: Ongoing, Progressing     Problem: Oral Intake Inadequate (Acute Kidney Injury/Impairment)  Goal: Optimal Nutrition Intake  Outcome: Ongoing, Progressing     Problem: Skin Injury Risk Increased  Goal: Skin Health and Integrity  Outcome: Ongoing, Progressing     Problem: Alcohol Withdrawal  Goal: Alcohol Withdrawal Symptom Control  Outcome: Ongoing, Progressing     Pt remained AAOx4 with VSS and NADN. Mood remained flat and cooperative throughout day. Frequent rounding completed, baugh care completed and safety precautions maintained. Will continue to monitor for any changes.

## 2023-08-18 VITALS
OXYGEN SATURATION: 94 % | WEIGHT: 123.44 LBS | BODY MASS INDEX: 22.71 KG/M2 | SYSTOLIC BLOOD PRESSURE: 127 MMHG | DIASTOLIC BLOOD PRESSURE: 67 MMHG | HEIGHT: 62 IN | TEMPERATURE: 98 F | RESPIRATION RATE: 19 BRPM | HEART RATE: 80 BPM

## 2023-08-18 LAB — AMMONIA PLAS-SCNC: 56 UMOL/L (ref 10–50)

## 2023-08-18 PROCEDURE — 25000003 PHARM REV CODE 250: Performed by: FAMILY MEDICINE

## 2023-08-18 PROCEDURE — 97110 THERAPEUTIC EXERCISES: CPT

## 2023-08-18 PROCEDURE — 97530 THERAPEUTIC ACTIVITIES: CPT

## 2023-08-18 PROCEDURE — 25000003 PHARM REV CODE 250: Performed by: INTERNAL MEDICINE

## 2023-08-18 PROCEDURE — C9113 INJ PANTOPRAZOLE SODIUM, VIA: HCPCS | Performed by: FAMILY MEDICINE

## 2023-08-18 PROCEDURE — 36415 COLL VENOUS BLD VENIPUNCTURE: CPT | Performed by: INTERNAL MEDICINE

## 2023-08-18 PROCEDURE — 63600175 PHARM REV CODE 636 W HCPCS: Performed by: FAMILY MEDICINE

## 2023-08-18 PROCEDURE — 94761 N-INVAS EAR/PLS OXIMETRY MLT: CPT

## 2023-08-18 PROCEDURE — 82140 ASSAY OF AMMONIA: CPT | Performed by: INTERNAL MEDICINE

## 2023-08-18 PROCEDURE — 97530 THERAPEUTIC ACTIVITIES: CPT | Mod: CO

## 2023-08-18 RX ORDER — POTASSIUM CHLORIDE 20 MEQ/1
40 TABLET, EXTENDED RELEASE ORAL ONCE
Status: COMPLETED | OUTPATIENT
Start: 2023-08-18 | End: 2023-08-18

## 2023-08-18 RX ORDER — CEFTRIAXONE 1 G/1
2 INJECTION, POWDER, FOR SOLUTION INTRAMUSCULAR; INTRAVENOUS DAILY
Qty: 20 G | Refills: 0
Start: 2023-08-18 | End: 2023-08-28

## 2023-08-18 RX ORDER — LORAZEPAM 1 MG/1
1 TABLET ORAL ONCE
Status: COMPLETED | OUTPATIENT
Start: 2023-08-18 | End: 2023-08-18

## 2023-08-18 RX ORDER — AMOXICILLIN 250 MG
1 CAPSULE ORAL 2 TIMES DAILY
Qty: 6 TABLET | Refills: 0
Start: 2023-08-18 | End: 2023-08-21

## 2023-08-18 RX ORDER — MUPIROCIN 20 MG/G
OINTMENT TOPICAL 2 TIMES DAILY
Qty: 1 G | Refills: 0
Start: 2023-08-18 | End: 2023-08-21

## 2023-08-18 RX ADMIN — RIFAXIMIN 550 MG: 550 TABLET ORAL at 10:08

## 2023-08-18 RX ADMIN — LACTULOSE 30 G: 20 SOLUTION ORAL at 10:08

## 2023-08-18 RX ADMIN — PANTOPRAZOLE SODIUM 40 MG: 40 INJECTION, POWDER, LYOPHILIZED, FOR SOLUTION INTRAVENOUS at 10:08

## 2023-08-18 RX ADMIN — LORAZEPAM 1 MG: 1 TABLET ORAL at 07:08

## 2023-08-18 RX ADMIN — POTASSIUM CHLORIDE 40 MEQ: 1500 TABLET, EXTENDED RELEASE ORAL at 10:08

## 2023-08-18 RX ADMIN — LEVETIRACETAM 1000 MG: 500 SOLUTION ORAL at 10:08

## 2023-08-18 RX ADMIN — MUPIROCIN: 20 OINTMENT TOPICAL at 10:08

## 2023-08-18 RX ADMIN — LACTULOSE 30 G: 20 SOLUTION ORAL at 03:08

## 2023-08-18 NOTE — PT/OT/SLP PROGRESS
"Occupational Therapy   Treatment    Name: Marely Hamilton  MRN: [id number]  Admitting Diagnosis:  Severe sepsis       Recommendations:     Discharge Recommendations:  (moderate intensity therapy)  Discharge Equipment Recommendations:  to be determined by next level of care  Barriers to discharge:  None    Assessment:     Marely Hamilton is a 58 y.o. female with a medical diagnosis of Severe sepsis.  Performance deficits affecting function are weakness, impaired endurance, impaired self care skills, decreased lower extremity function, impaired functional mobility, gait instability, impaired balance, impaired cognition, decreased coordination, decreased upper extremity function, decreased safety awareness, pain, edema, impaired skin, decreased ROM, impaired coordination.     Rehab Prognosis:  Fair; patient would benefit from acute skilled OT services to address these deficits and reach maximum level of function.       Plan:     Patient to be seen 3 x/week to address the above listed problems via self-care/home management, therapeutic activities, therapeutic exercises  Plan of Care Expires: 09/16/23  Plan of Care Reviewed with: patient    Subjective     Chief Complaint: "I feel ok"  Patient/Family Comments/goals: return to PLOF  Pain/Comfort:  Pain Rating 1: 0/10  Pain Rating Post-Intervention 1: 0/10    Objective:     Communicated with: nursePam prior to session.  Patient found HOB elevated with bed alarm, telemetry, baugh catheter upon OT entry to room.    General Precautions: Standard, fall    Orthopedic Precautions:N/A  Braces: N/A  Respiratory Status: Room air    Bed Mobility:    Patient completed Scooting/Bridging with ModA to EOB; Dep x 2 to HOB  Patient completed Supine to Sit with maximal assistance and 2 persons  Patient completed Sit to Supine with maximal assistance and 2 persons     Activities of Daily Living:  Grooming: stand by assistance to wash face    [facility] 6 Click ADL: 14    Treatment & " Education:  -Educated on purpose/role of OT  -Increased time, effort and VCs for all tasks/transitions  -Sat EOB x ~10 mins /c SBA-ModA (during BLE TE, increased balance assist required)  -After 10 mins patient returned self quickly to R SL and rested (about ~5 mins) before returning to sitting for another ~8 mins  -WBing activities in various planes, incl fwd WSing over BLEs  -Unable to scoot laterally, so returned to bed level and scooted via drawsheet  -Pillow placed between knees for proper alignment and comfort    Patient left right sidelying with all lines intact, call button in reach, and bed alarm on    GOALS:   Multidisciplinary Problems       Occupational Therapy Goals          Problem: Occupational Therapy    Goal Priority Disciplines Outcome Interventions   Occupational Therapy Goal     OT, PT/OT Ongoing, Progressing    Description: Goals to be met by: 9/16/2023     Patient will increase functional independence with ADLs by performing:    UE Dressing with Set-up Assistance.  Grooming while seated with Set-up Assistance.  Toileting from bedside commode with Minimal Assistance for hygiene and clothing management.   Supine to sit with Minimal Assistance.  Step transfer with Moderate Assistance & appropriate AD.  Toilet transfer to bedside commode with Moderate Assistance & appropriate AD.                         Time Tracking:     OT Date of Treatment: 08/18/23  OT Start Time: 1311  OT Stop Time: 1341  OT Total Time (min): 30 min Overlap with PT for portions of session due to complex nature of patient and for safety with mobility to decrease fall risk for patient and caregiver injury requiring two skilled therapists to provide different interventions.    Billable Minutes:Therapeutic Activity 30    OT/DC: DC     Number of DC visits since last OT visit: 1    8/18/2023

## 2023-08-18 NOTE — HOSPITAL COURSE
Marely Hamilton is a 57 y/o F with a PMH of anemia alcohol abuse, bipolar disorder depression, fatigue, history of seizure, osteoarthritis, thrombocytopenia brought in by EMS from [facility] with complaints of confusion with patient home O2 not in place, there is associated abdominal pain, chills and then 101.4 fever when she arrived in the ED.   Na 132, K 3.5, Bun/Cr 8/0.5, , wbc 3.21, H/H 9.0/28, PLT 58, INR 1.9, Lactate 2.4, Ca 7.7, Alb 1.9, CXR Nonspecific bibasilar opacities could relate to atelectasis, aspiration or pneumonia    * Severe sepsis  This patient does have evidence of infective focus  Overall impression is sepsis.  Source: Urinary Tract  Antibiotics given-             Antibiotics (72h ago, onward)     Start     Stop Route Frequency Ordered     08/17/23 0900   mupirocin 2 % ointment         08/22/23 0859 Nasl 2 times daily 08/17/23 0140     08/15/23 1830   cefTRIAXone (ROCEPHIN) 2 g in dextrose 5 % in water (D5W) 100 mL IVPB (MB+)         -- IV Every 24 hours (non-standard times) 08/15/23 1435     08/14/23 2100   rifAXIMin tablet 550 mg         -- Oral 2 times daily 08/14/23 1827          Latest lactate reviewed-      Recent Labs   Lab 08/15/23  1257   LACTATE 3.1*      Organ dysfunction indicated by Encephalopathy and Thrombocytopenia      Will Not start Pressors- Levophed for MAP of 65  Source control achieved by:  Rocephin IV     -ID consulted= appreciate input and follow recommendations  -echocardiogram on 08/16/2023:    Left Ventricle: The left ventricle is normal in size. Normal wall thickness. Normal wall motion. There is normal systolic function. Ejection fraction by visual approximation is 70%. There is normal diastolic function.    Left Atrium: Left atrium is mildly dilated.    Right Ventricle: Normal right ventricular cavity size. Wall thickness is normal. Right ventricle wall motion  is normal. Systolic function is normal.    Aortic Valve: There is mild aortic valve sclerosis.  There is mild stenosis. Aortic valve area by VTI is 1.50 cm². Aortic valve peak velocity is 2.35 m/s. Mean gradient is 12 mmHg. The dimensionless index is 0.51.    IVC/SVC: Normal venous pressure at 3 mmHg.        -ID recommends ceftriaxone 2gm IV daily for total of 2 wks from 8/14 to 8/28/23      Gram-negative bacteremia           SBP (spontaneous bacterial peritonitis)  -unlikely SBP as no significant ascitic fluid seen  -SBP ruled out           UTI (urinary tract infection)  -afebrile   -no leukocytosis  -Lactate on admit= 2.4  -monitor lactic acidosis  -Blood cx on 08/14/2023= E coli.  Sensitive to Rocephin.  -blood PCR on 08/14/2023= E coli and Enterobacterales  -Urine culture on 08/14/2023= E coli.  Sensitive to Rocephin  -renal ultrasound per ID recommendations on 08/16/2023= pending  -Rocephin IV daily based on sensitivity to E colii.  Ordered on 08/14/2023  -ID consulted= appreciate input follow recommendations  -ID recommends ceftriaxone 2gm IV daily for total of 2 wks from 8/14 to 8/28/23   -ordered midline placement for IV antibiotics on 08/17/2023      consulted for discharge planning           Pneumonia  -weaned off supplemental oxygen  - chest x-ray on 08/14/2023=Nonspecific bibasilar opacities could relate to atelectasis, aspiration or pneumonia. Interstitial coarsening appears mostly chronic, noting that a superimposed degree of acute pulmonary vascular congestion/interstitial edema is not excluded. question small right pleural effusion.  Continue to monitor  -continue antibiotics           Thrombocytopenia  -Due to chronic liver disease  -monitor platelets= decrease  -consider transfusion of 1 unit platelets if platelet count less than 10,000         Edema  -bilateral lower extremity edema likely due to cirrhosis               Ascites  -consult IR for diagnostic paracentesis  -discussed case with patient and patient's sister Zaria on 08/15/2023 and they are both agreeable for  paracentesis  -abdominal paracentesis on 08/15/2023 by IR= attempted but no significant ascites seen per radiology(per notes)  -ascitic fluid culture and analysis on 08/15/2023= no significant fluid collected        AMS (altered mental status)  -resolved.  Patient is awake alert orient x3 currently  -Due to cirrhosis and hyperammonemia  -Improved.  Patient is awake alert and oriented x3 currently  -ammonia level on 08/14/2023= 55 (elevated)  -Continue Lactulose p.o. t.i.d.  -continue rifaximin        Gastrointestinal hemorrhage associated with duodenal ulcer  Continue PPI        Acute blood loss anemia  -no overt bleeding seen or reported   -monitor H&H= stable range but with decrease  -FOB T= pending   -PPI bid  -consider transfusion 1 unit PRBC if hemoglobin less than 7  -GI consulted= appreciate input and follow recommendations        Cirrhosis, Laennec's  Alcohol cirrhosis  Continue Lactulose  Rifaximin   Monitor electrolyte        Chronic deep vein thrombosis (DVT) of right lower extremity  -bilateral lower extremity venous ultrasound on 08/15/2023= Chronic DVT of the right common femoral and saphenofemoral junction. Bilateral inguinal ascites  -IVC filter in place (patient had IVC filter placed on recent hospitalization )  -patient not a candidate for anticoagulation due to severe thrombocytopenia        Alcoholic cirrhosis           Acute encephalopathy  -resolved.  Patient is awake alert orient        Bipolar 1 disorder, depressed, severe  Bipolar disorder, manic  -stable   -On Zyprexa and Olanzapine- on hold due to confusion on admit  -Continue Lithium        History of seizure  -stable   -continue Keppra             VTE Risk Mitigation (From admission, onward)           Ordered       IP VTE HIGH RISK PATIENT  Once         08/14/23 1827       Place sequential compression device  Until discontinued         08/14/23 1827              Patient was examined in her bed.  She is jaundiced, but calm and has no  complaints.  She also has mild abdominal distention.  She denies chest pain, palpitation, shortness of breath, GI/ symptoms.  She has midline in place.  Patient will be discharged today with strict instructions to follow up with physician within 3-5 days.

## 2023-08-18 NOTE — DISCHARGE SUMMARY
[facility] Medicine  Discharge Summary      Patient Name: Marely Hamilton  MRN: [id number]  BUTCH: [id number]  Patient Class: IP- Inpatient  Admission Date: 8/14/2023  Hospital Length of Stay: 4 days  Discharge Date and Time: No discharge date for patient encounter.  Attending Physician: Driss Umaña MD   Discharging Provider: Driss Umaña MD  Primary Care Provider: Viktor Ross MD    Primary Care Team: Networked reference to record PCT     HPI:   Marely Hamilton is a 59 y/o F with a PMH of anemia alcohol abuse, bipolar disorder depression, fatigue, history of seizure, osteoarthritis, thrombocytopenia brought in by EMS from [facility] with complaints of confusion with patient home O2 not in place, there is associated abdominal pain, chills and then 101.4 fever when she arrived in the ED.   Na 132, K 3.5, Bun/Cr 8/0.5, , wbc 3.21, H/H 9.0/28, PLT 58, INR 1.9, Lactate 2.4, Ca 7.7, Alb 1.9, CXR Nonspecific bibasilar opacities could relate to atelectasis, aspiration or pneumonia      * No surgery found *      Hospital Course:   Marely Hamilton is a 59 y/o F with a PMH of anemia alcohol abuse, bipolar disorder depression, fatigue, history of seizure, osteoarthritis, thrombocytopenia brought in by EMS from [facility] with complaints of confusion with patient home O2 not in place, there is associated abdominal pain, chills and then 101.4 fever when she arrived in the ED.   Na 132, K 3.5, Bun/Cr 8/0.5, , wbc 3.21, H/H 9.0/28, PLT 58, INR 1.9, Lactate 2.4, Ca 7.7, Alb 1.9, CXR Nonspecific bibasilar opacities could relate to atelectasis, aspiration or pneumonia    * Severe sepsis  This patient does have evidence of infective focus  Overall impression is sepsis.  Source: Urinary Tract  Antibiotics given-             Antibiotics (72h ago, onward)     Start     Stop Route Frequency Ordered     08/17/23 0900   mupirocin 2 % ointment         08/22/23 0859 Nasl 2 times daily 08/17/23 0140      08/15/23 1830   cefTRIAXone (ROCEPHIN) 2 g in dextrose 5 % in water (D5W) 100 mL IVPB (MB+)         -- IV Every 24 hours (non-standard times) 08/15/23 1435     08/14/23 2100   rifAXIMin tablet 550 mg         -- Oral 2 times daily 08/14/23 1827          Latest lactate reviewed-      Recent Labs   Lab 08/15/23  1257   LACTATE 3.1*      Organ dysfunction indicated by Encephalopathy and Thrombocytopenia      Will Not start Pressors- Levophed for MAP of 65  Source control achieved by:  Rocephin IV     -ID consulted= appreciate input and follow recommendations  -echocardiogram on 08/16/2023:    Left Ventricle: The left ventricle is normal in size. Normal wall thickness. Normal wall motion. There is normal systolic function. Ejection fraction by visual approximation is 70%. There is normal diastolic function.    Left Atrium: Left atrium is mildly dilated.    Right Ventricle: Normal right ventricular cavity size. Wall thickness is normal. Right ventricle wall motion  is normal. Systolic function is normal.    Aortic Valve: There is mild aortic valve sclerosis. There is mild stenosis. Aortic valve area by VTI is 1.50 cm². Aortic valve peak velocity is 2.35 m/s. Mean gradient is 12 mmHg. The dimensionless index is 0.51.    IVC/SVC: Normal venous pressure at 3 mmHg.        -ID recommends ceftriaxone 2gm IV daily for total of 2 wks from 8/14 to 8/28/23      Gram-negative bacteremia           SBP (spontaneous bacterial peritonitis)  -unlikely SBP as no significant ascitic fluid seen  -SBP ruled out           UTI (urinary tract infection)  -afebrile   -no leukocytosis  -Lactate on admit= 2.4  -monitor lactic acidosis  -Blood cx on 08/14/2023= E coli.  Sensitive to Rocephin.  -blood PCR on 08/14/2023= E coli and Enterobacterales  -Urine culture on 08/14/2023= E coli.  Sensitive to Rocephin  -renal ultrasound per ID recommendations on 08/16/2023= pending  -Rocephin IV daily based on sensitivity to E colii.  Ordered on  08/14/2023  -ID consulted= appreciate input follow recommendations  -ID recommends ceftriaxone 2gm IV daily for total of 2 wks from 8/14 to 8/28/23   -ordered midline placement for IV antibiotics on 08/17/2023      consulted for discharge planning           Pneumonia  -weaned off supplemental oxygen  - chest x-ray on 08/14/2023=Nonspecific bibasilar opacities could relate to atelectasis, aspiration or pneumonia. Interstitial coarsening appears mostly chronic, noting that a superimposed degree of acute pulmonary vascular congestion/interstitial edema is not excluded. question small right pleural effusion.  Continue to monitor  -continue antibiotics           Thrombocytopenia  -Due to chronic liver disease  -monitor platelets= decrease  -consider transfusion of 1 unit platelets if platelet count less than 10,000         Edema  -bilateral lower extremity edema likely due to cirrhosis               Ascites  -consult IR for diagnostic paracentesis  -discussed case with patient and patient's sister Zaria on 08/15/2023 and they are both agreeable for paracentesis  -abdominal paracentesis on 08/15/2023 by IR= attempted but no significant ascites seen per radiology(per notes)  -ascitic fluid culture and analysis on 08/15/2023= no significant fluid collected        AMS (altered mental status)  -resolved.  Patient is awake alert orient x3 currently  -Due to cirrhosis and hyperammonemia  -Improved.  Patient is awake alert and oriented x3 currently  -ammonia level on 08/14/2023= 55 (elevated)  -Continue Lactulose p.o. t.i.d.  -continue rifaximin        Gastrointestinal hemorrhage associated with duodenal ulcer  Continue PPI        Acute blood loss anemia  -no overt bleeding seen or reported   -monitor H&H= stable range but with decrease  -FOB T= pending   -PPI bid  -consider transfusion 1 unit PRBC if hemoglobin less than 7  -GI consulted= appreciate input and follow recommendations        Cirrhosis,  Capri's  Alcohol cirrhosis  Continue Lactulose  Rifaximin   Monitor electrolyte        Chronic deep vein thrombosis (DVT) of right lower extremity  -bilateral lower extremity venous ultrasound on 08/15/2023= Chronic DVT of the right common femoral and saphenofemoral junction. Bilateral inguinal ascites  -IVC filter in place (patient had IVC filter placed on recent hospitalization )  -patient not a candidate for anticoagulation due to severe thrombocytopenia        Alcoholic cirrhosis           Acute encephalopathy  -resolved.  Patient is awake alert orient        Bipolar 1 disorder, depressed, severe  Bipolar disorder, manic  -stable   -On Zyprexa and Olanzapine- on hold due to confusion on admit  -Continue Lithium        History of seizure  -stable   -continue Keppra             VTE Risk Mitigation (From admission, onward)           Ordered       IP VTE HIGH RISK PATIENT  Once         08/14/23 1827       Place sequential compression device  Until discontinued         08/14/23 1827              Patient was examined in her bed.  She is jaundiced, but calm and has no complaints.  She also has mild abdominal distention.  She denies chest pain, palpitation, shortness of breath, GI/ symptoms.  She has midline in place.  Patient will be discharged today with strict instructions to follow up with physician within 3-5 days.       Goals of Care Treatment Preferences:  Code Status: Full Code      Consults:   Consults (From admission, onward)        Status Ordering Provider     Inpatient consult to Midline team  Once        Provider:  (Not yet assigned)    Acknowledged SHARRON MIRANDA     Inpatient consult to Social Work  Once        Provider:  (Not yet assigned)    Completed SHARRON MIRANDA     Inpatient consult to Infectious Diseases  Once        Provider:  Sukh Giraldo MD    Completed SHARRON MIRANDA     Inpatient consult to Registered Dietitian/Nutritionist  Once        Provider:  (Not yet assigned)    Completed  INNOCENT-ITUAH, NIRMALA N.     IP consult to case management  Once        Provider:  (Not yet assigned)    Completed INNOCENT-ITUAH, NIRMALA N.     Gastroenterology  Once        Provider:  (Not yet assigned)    Completed INNOCENT-ITUAH, NIRMALA N.     Interventional Radiology  Once        Provider:  (Not yet assigned)    Completed INNOCENT-ITUAH, NIRMALA N.     Inpatient consult to Interventional Radiology  Once        Provider:  (Not yet assigned)    Completed TONIA VIGIL     Inpatient consult to Interventional Radiology  Once        Provider:  (Not yet assigned)    Completed INNOCENT-ITUAH, NIRMALA N.          No new Assessment & Plan notes have been filed under this hospital service since the last note was generated.  Service: Hospital Medicine    Final Active Diagnoses:    Diagnosis Date Noted POA    PRINCIPAL PROBLEM:  Severe sepsis [A41.9, R65.20] 08/16/2023 Yes    Chronic deep vein thrombosis (DVT) of right lower extremity [I82.501] 08/16/2023 Yes    Edema [R60.9] 08/15/2023 Yes    Gram-negative bacteremia [R78.81] 08/15/2023 Yes    SBP (spontaneous bacterial peritonitis) [K65.2] 08/14/2023 Yes    Pneumonia [J18.9] 08/14/2023 Yes    AMS (altered mental status) [R41.82] 08/14/2023 Yes    Gastrointestinal hemorrhage associated with duodenal ulcer [K26.4] 07/07/2023 Yes    Acute blood loss anemia [D62] 06/30/2023 Yes    UTI (urinary tract infection) [N39.0] 11/03/2021 Yes    Acute encephalopathy [G93.40] 11/21/2020 Yes    Bipolar 1 disorder, depressed, severe [F31.4]  Yes    Alcoholic cirrhosis [K70.30] 04/27/2018 Yes    Cirrhosis, Laennec's [K70.30] 06/15/2015 Yes    History of seizure [Z87.898] 06/15/2015 Yes    Thrombocytopenia [D69.6] 06/15/2015 Yes    Ascites [R18.8] 06/15/2015 Yes      Problems Resolved During this Admission:    Diagnosis Date Noted Date Resolved POA    Bipolar 1 disorder [F31.9] 06/09/2020 08/15/2023 Yes       Discharged Condition: stable    Disposition: Skilled Nursing  Facility    Follow Up:   Follow-up Information     Viktor Ross MD Follow up on 8/22/2023.    Specialty: Family Medicine  Why: at 8:30 AM; hospital follow up appointment with Dr Herb Brito  Contact information:  [address] [address]  [phone]             Analisa Alvarado MD Follow up on 10/10/2023.    Specialties: Transplant, Hepatology, Gastroenterology  Why: at 3:30pm; hepatology hospital follow up appointment; patient will be notified if a sooner appointment becomes available  Contact information:  [address]  [place] [address]  [phone]                       Patient Instructions:      Ambulatory referral/consult to Hepatology   Standing Status: Future   Referral Priority: Routine Referral Type: Consultation   Referral Reason: Specialty Services Required   Requested Specialty: Hepatology   Number of Visits Requested: 1       Significant Diagnostic Studies: N/A    Pending Diagnostic Studies:     Procedure Component Value Units Date/Time    Lactic acid, plasma #2 [[id number]]     Order Status: Sent Lab Status: No result     Specimen: Blood          Medications:  Reconciled Home Medications:      Medication List      START taking these medications    cefTRIAXone 1 gram injection  Commonly known as: ROCEPHIN  Inject 2 g into the vein once daily. for 10 days     mupirocin 2 % ointment  Commonly known as: BACTROBAN  by Nasal route 2 (two) times daily. for 3 days     senna-docusate 8.6-50 mg 8.6-50 mg per tablet  Commonly known as: PERICOLACE  Take 1 tablet by mouth 2 (two) times daily. for 3 days        CONTINUE taking these medications    ferrous sulfate 325 mg (65 mg iron) Tab tablet  Commonly known as: FEOSOL  Take 325 mg by mouth once daily.     lactulose 20 gram/30 mL Soln  Commonly known as: CHRONULAC  Take 45 mLs (30 g total) by mouth 3 (three) times daily.     levetiracetam 500 mg/5 mL (5 mL) Soln  Take 10 mLs (1,000 mg total) by mouth 2 (two) times daily.     lithium  300 MG CR tablet  Commonly known as: LITHOBID  Take 1 tablet (300 mg total) by mouth every evening.     OLANZapine 5 MG tablet  Commonly known as: ZyPREXA  Take 1 tablet (5 mg total) by mouth every evening.     pantoprazole 40 mg suspension  Commonly known as: PROTONIX  Take 1 packet (40 mg total) by mouth 2 (two) times daily.     rifAXIMin 550 mg Tab  Commonly known as: XIFAXAN  Take 1 tablet (550 mg total) by mouth 2 (two) times daily.            Indwelling Lines/Drains at time of discharge:   Lines/Drains/Airways     Drain  Duration                Urethral Catheter -- days                Time spent on the discharge of patient: 50 minutes         Driss Umaña MD  Department of Hospital Medicine  [facility]

## 2023-08-18 NOTE — PLAN OF CARE
Problem: Adult Inpatient Plan of Care  Goal: Plan of Care Review  Outcome: Ongoing, Progressing  Goal: Patient-Specific Goal (Individualized)  Outcome: Ongoing, Progressing  Goal: Absence of Hospital-Acquired Illness or Injury  Outcome: Ongoing, Progressing  Goal: Optimal Comfort and Wellbeing  Outcome: Ongoing, Progressing  Goal: Readiness for Transition of Care  Outcome: Ongoing, Progressing     Problem: Fluid and Electrolyte Imbalance (Acute Kidney Injury/Impairment)  Goal: Fluid and Electrolyte Balance  Outcome: Ongoing, Progressing     Problem: Oral Intake Inadequate (Acute Kidney Injury/Impairment)  Goal: Optimal Nutrition Intake  Outcome: Ongoing, Progressing     Problem: Skin Injury Risk Increased  Goal: Skin Health and Integrity  Outcome: Ongoing, Progressing

## 2023-08-18 NOTE — PLAN OF CARE
"0940  Message sent to Dr Umaña informing that ins auth has been obtained for the pt's discharge to [facility] & midline was placed. CM requested SNF orders. Awaiting response.     0945  Message sent to the schedulers requesting a hospfu appts with Dr Viktor Ross (PCP) & Hep. Awaiting response.     1045  CM was informed by  Shavon of a hospfu appts scheduled for the patient with Dr Herb Pierceo28/22/2023 on 0830 & Dr Analisa Alvarado (hep)on 10/10/2023 at 1530. Information added to the patient's discharge paperwork. Transportation packet left at the nurse's station.     1110  Patient resting quietly in bed when CM participated in SIBR with Dr Umaña. LUE midline noted. MD will check the pt's ammonia level prior to dc today.      1300  CM informed Dr Umaña of ammonia level 56 today. Awaiting SNF orders.     1518  SNF orders sent to [facility] via [facility]. Awaiting response.     1530  CM was informed by Rere robison/[facility] that the pt will be admitted to room 812A today & requested that report be called to the [address] [place] [address] nurse at [phone]. Ambulance transportation scheduled with requested pickup at 1700.    1535  Message sent to nurse Orozco, virtual nurse Teresa, pharmacist Reese informing that the pt is cleared to discharge, of transportation scheduled, & requesting that Pam call report.     1540   informed the pt's sister, Zaria Hamilton ([phone]), via phone that the pt will discharge to [facility] SNF today. Zaria verbalized understanding, agreement, & appreciation. Telephone consent obtained from Zaria. "Pt Choice" form placed in the pt's chart.       Will continue to follow.   "

## 2023-08-18 NOTE — NURSING
Patient alert and anxious at shift change. Patient educated on fall risk, call light, medications, and verbalize understanding. Patient's has mild tremors this tonight, but willing to take medications. RN medicated per MAR, and tolerated well. RN will continue plan of care.

## 2023-08-18 NOTE — DISCHARGE INSTRUCTIONS
"[facility] [[email or web address]] Transfer Orders        Admit to: SNF    Diagnoses:   Active Hospital Problems    Diagnosis  POA    *Severe sepsis [A41.9, R65.20]  Yes    Chronic deep vein thrombosis (DVT) of right lower extremity [I82.501]  Yes    Edema [R60.9]  Yes    Gram-negative bacteremia [R78.81]  Yes    SBP (spontaneous bacterial peritonitis) [K65.2]  Yes    Pneumonia [J18.9]  Yes    AMS (altered mental status) [R41.82]  Yes    Gastrointestinal hemorrhage associated with duodenal ulcer [K26.4]  Yes    Acute blood loss anemia [D62]  Yes    UTI (urinary tract infection) [N39.0]  Yes    Acute encephalopathy [G93.40]  Yes    Bipolar 1 disorder, depressed, severe [F31.4]  Yes    Alcoholic cirrhosis [K70.30]  Yes     Last Assessment & Plan:   Formatting of this note might be different from the original.  This is reportedly due to tylenol OD and alcohol abuse per her sister. She has severe disease with elevated INR and MELD score of 25. She is at her baseline per her PCP. She was continued on rifaximin and encouraged to take lactulose. She was started on propranolol temporary for significant tachycardia. She is reportedly followed by Dr. Colt PATTEN, at [facility].  Formatting of this note might be different from the original.  Last Assessment & Plan:   Formatting of this note might be different from the original.  This is reportedly due to tylenol OD and alcohol abuse per her sister. She has severe disease with elevated INR and MELD score of 25. She is at her baseline per her PCP. She was continued on rifaximin and encouraged to take lactulose. She was started on propranolol temporary for significant tachycardia. She is reportedly followed by Dr. Colt PATTEN, at [facility].      Cirrhosis, Laennec's [K70.30]  Yes    History of seizure [Z87.898]  Yes     One seizure 2013- "low blood sugar from a starvation diet"      Thrombocytopenia [D69.6]  Yes    Ascites [R18.8]  Yes      Resolved Hospital Problems    " Diagnosis Date Resolved POA    Bipolar 1 disorder [F31.9] 08/15/2023 Yes     Allergies:   Review of patient's allergies indicates:   Allergen Reactions    Sulfa (sulfonamide antibiotics) Rash    Codeine Nausea And Vomiting       Code Status: FULL CODE    Vitals: Routine       Diet: cardiac diet    Activity: Activity as tolerated    Nursing Precautions: Fall    Bed/Surface:     Consults: PT to evaluate and treat- 3 times a week and OT to evaluate and treat-   times a week    Oxygen: room air    Dialysis: Patient is not on dialysis.     Labs: First blood draw on 08/23/2023.               CBC, CMP, ESR, CRP, Ammonia, LFT. Draw labs weekly and can be faxed to Dr Giraldo at [phone]  Pending Diagnostic Studies:       Procedure Component Value Units Date/Time    Lactic acid, plasma #2 [[id number]]     Order Status: Sent Lab Status: No result     Specimen: Blood           Imaging:     Miscellaneous Care:       IV Access: Mid-line     Medications: Discontinue all previous medication orders, if any. See new list below.  Current Discharge Medication List        CONTINUE these medications which have NOT CHANGED    Details   ferrous sulfate (FEOSOL) 325 mg (65 mg iron) Tab tablet Take 325 mg by mouth once daily.      lactulose (CHRONULAC) 20 gram/30 mL Soln Take 45 mLs (30 g total) by mouth 3 (three) times daily.  Qty: 4050 mL, Refills: 2      levetiracetam 500 mg/5 mL (5 mL) Soln Take 10 mLs (1,000 mg total) by mouth 2 (two) times daily.  Qty: 600 mL, Refills: 11      lithium (LITHOBID) 300 MG CR tablet Take 1 tablet (300 mg total) by mouth every evening.  Qty: 30 tablet, Refills: 11      OLANZapine (ZYPREXA) 5 MG tablet Take 1 tablet (5 mg total) by mouth every evening.  Qty: 30 tablet, Refills: 11      pantoprazole (PROTONIX) 40 mg suspension Take 1 packet (40 mg total) by mouth 2 (two) times daily.  Qty: 60 packet, Refills: 11      rifAXIMin (XIFAXAN) 550 mg Tab Take 1 tablet (550 mg total) by mouth 2 (two) times  daily.  Qty: 180 tablet, Refills: 3           Follow up:    Follow-up Information       Viktor Ross MD Follow up on 8/22/2023.    Specialty: Family Medicine  Why: at 8:30 AM; hospital follow up appointment with Dr Herb Brito  Contact information:  [address] [address]  [phone]               Analisa Alvarado MD Follow up on 10/10/2023.    Specialties: Transplant, Hepatology, Gastroenterology  Why: at 3:30pm; hepatology hospital follow up appointment; patient will be notified if a sooner appointment becomes available  Contact information:  [address]  [facility] [address]  [phone]                               Immunizations Administered as of 8/18/2023       Name Date Dose VIS Date Route Exp Date    COVID-19, MRNA, LN-S, PF (Moderna) 1/26/2022 0.5 mL -- Intramuscular --    Site: Right arm     Lot: [id number]     COVID-19, MRNA, LN-S, PF (Moderna) 5/6/2021 11:23 AM 0.5 mL 12/19/2020 Intramuscular 10/2/2021    Site: Right deltoid     Given By: Radha Chan PharmD     : Moderna [employer]     Lot: [id number]     COVID-19, MRNA, LN-S, PF (Moderna) 4/8/2021 11:16 AM 0.5 mL 12/19/2020 Intramuscular 9/17/2021    Site: Right deltoid     Given By: Beatris Friend     : Moderna [employer]     Lot: [id number]             COVID dose 4 pending    Some patients may experience side effects after vaccination.  These may include fever, headache, muscle or joint aches.  Most symptoms resolve with 24-48 hours and do not require urgent medical evaluation unless they persist for more than 72 hours or symptoms are concerning for an unrelated medical condition.          Driss Umaña MD

## 2023-08-18 NOTE — PROGRESS NOTES
"[facility] Transfer Orders        Admit to: SNF    Diagnoses:   Active Hospital Problems    Diagnosis  POA    *Severe sepsis [A41.9, R65.20]  Yes    Chronic deep vein thrombosis (DVT) of right lower extremity [I82.501]  Yes    Edema [R60.9]  Yes    Gram-negative bacteremia [R78.81]  Yes    SBP (spontaneous bacterial peritonitis) [K65.2]  Yes    Pneumonia [J18.9]  Yes    AMS (altered mental status) [R41.82]  Yes    Gastrointestinal hemorrhage associated with duodenal ulcer [K26.4]  Yes    Acute blood loss anemia [D62]  Yes    UTI (urinary tract infection) [N39.0]  Yes    Acute encephalopathy [G93.40]  Yes    Bipolar 1 disorder, depressed, severe [F31.4]  Yes    Alcoholic cirrhosis [K70.30]  Yes     Last Assessment & Plan:   Formatting of this note might be different from the original.  This is reportedly due to tylenol OD and alcohol abuse per her sister. She has severe disease with elevated INR and MELD score of 25. She is at her baseline per her PCP. She was continued on rifaximin and encouraged to take lactulose. She was started on propranolol temporary for significant tachycardia. She is reportedly followed by Dr. Colt PATTEN, at [facility].  Formatting of this note might be different from the original.  Last Assessment & Plan:   Formatting of this note might be different from the original.  This is reportedly due to tylenol OD and alcohol abuse per her sister. She has severe disease with elevated INR and MELD score of 25. She is at her baseline per her PCP. She was continued on rifaximin and encouraged to take lactulose. She was started on propranolol temporary for significant tachycardia. She is reportedly followed by Dr. Colt PATTNE, at [facility].      Cirrhosis, Laennec's [K70.30]  Yes    History of seizure [Z87.898]  Yes     One seizure 2013- "low blood sugar from a starvation diet"      Thrombocytopenia [D69.6]  Yes    Ascites [R18.8]  Yes      Resolved Hospital Problems    " Diagnosis Date Resolved POA    Bipolar 1 disorder [F31.9] 08/15/2023 Yes     Allergies:   Review of patient's allergies indicates:   Allergen Reactions    Sulfa (sulfonamide antibiotics) Rash    Codeine Nausea And Vomiting       Code Status: FULL CODE    Vitals: Routine       Diet: cardiac diet    Activity: Activity as tolerated    Nursing Precautions: Fall    Bed/Surface:     Consults: PT to evaluate and treat- 3 times a week and OT to evaluate and treat-  times a week    Oxygen: room air    Dialysis: Patient is not on dialysis.     Labs: First blood draw on 08/23/2023.               CBC, CMP, ESR, CRP, Ammonia, LFT. Draw labs weekly and can be faxed to Dr Giraldo at [phone]  Pending Diagnostic Studies:       Procedure Component Value Units Date/Time    Lactic acid, plasma #2 [[id number]]     Order Status: Sent Lab Status: No result     Specimen: Blood           Imaging:     Miscellaneous Care:   Saldaña care as per protocol    IV Access: Mid-line care as per protocol    Recommendations:  -Continue ceftriaxone 2gm IV daily for total of 2 wks from 8/14 to 8/28/23   Weekly labs can be done CBC, CMP can be faxed to Dr Giraldo at [phone]     Medications: Discontinue all previous medication orders, if any. See new list below.  Current Discharge Medication List        CONTINUE these medications which have NOT CHANGED    Details   ferrous sulfate (FEOSOL) 325 mg (65 mg iron) Tab tablet Take 325 mg by mouth once daily.      lactulose (CHRONULAC) 20 gram/30 mL Soln Take 45 mLs (30 g total) by mouth 3 (three) times daily.  Qty: 4050 mL, Refills: 2      levetiracetam 500 mg/5 mL (5 mL) Soln Take 10 mLs (1,000 mg total) by mouth 2 (two) times daily.  Qty: 600 mL, Refills: 11      lithium (LITHOBID) 300 MG CR tablet Take 1 tablet (300 mg total) by mouth every evening.  Qty: 30 tablet, Refills: 11      OLANZapine (ZYPREXA) 5 MG tablet Take 1 tablet (5 mg total) by mouth every evening.  Qty: 30 tablet, Refills: 11       pantoprazole (PROTONIX) 40 mg suspension Take 1 packet (40 mg total) by mouth 2 (two) times daily.  Qty: 60 packet, Refills: 11      rifAXIMin (XIFAXAN) 550 mg Tab Take 1 tablet (550 mg total) by mouth 2 (two) times daily.  Qty: 180 tablet, Refills: 3           Follow up:    Follow-up Information       Viktor Ross MD Follow up on 8/22/2023.    Specialty: Family Medicine  Why: at 8:30 AM; hospital follow up appointment with Dr Herb Brito  Contact information:  [address] [facility] [address]  [place] [address]  [phone]               Analisa Alvarado MD Follow up on 10/10/2023.    Specialties: Transplant, Hepatology, Gastroenterology  Why: at 3:30pm; hepatology hospital follow up appointment; patient will be notified if a sooner appointment becomes available  Contact information:  [address]  [facility] [address]  [phone]                               Immunizations Administered as of 8/18/2023       Name Date Dose VIS Date Route Exp Date    COVID-19, MRNA, LN-S, PF (Moderna) 1/26/2022 0.5 mL -- Intramuscular --    Site: Right arm     Lot: [id number]     COVID-19, MRNA, LN-S, PF (Moderna) 5/6/2021 11:23 AM 0.5 mL 12/19/2020 Intramuscular 10/2/2021    Site: Right deltoid     Given By: Radha Chan PharmD     : Moderna [employer]     Lot: [id number]     COVID-19, MRNA, LN-S, PF (Moderna) 4/8/2021 11:16 AM 0.5 mL 12/19/2020 Intramuscular 9/17/2021    Site: Right deltoid     Given By: Beatris Friend     : Moderna [employer]     Lot: [id number]             COVID dose 4 pending    Some patients may experience side effects after vaccination.  These may include fever, headache, muscle or joint aches.  Most symptoms resolve with 24-48 hours and do not require urgent medical evaluation unless they persist for more than 72 hours or symptoms are concerning for an unrelated medical condition.          Driss Umaña MD

## 2023-08-19 NOTE — PLAN OF CARE
[facility] - Telemetry  Discharge Final Note    Primary Care Provider: Viktor Ross MD    Expected Discharge Date: 8/18/2023    Final Discharge Note (most recent)       Final Note - 08/19/23 0733          Final Note    Assessment Type Final Discharge Note (P)      Anticipated Discharge Disposition Skilled Nursing Facility (P)    [facility]       Post-Acute Status    Post-Acute Authorization Placement (P)      Post-Acute Placement Status Set-up Complete/Auth obtained (P)    [facility]    Discharge Delays PFC Arranged Transportation (P)                        Contact Info       Viktor Ross MD   Specialty: Family Medicine   Relationship: PCP - General    [address] [place] [address] [address]  [facility] [address]   Phone: [phone]       Next Steps: Follow up on 8/22/2023    Instructions: at 8:30 AM; hospital follow up appointment with Analisa Campo MD   Specialty: Transplant, Hepatology, Gastroenterology    [address] [facility] [address]   Phone: [phone]       Next Steps: Follow up on 10/10/2023    Instructions: at 3:30pm; hepatology hospital follow up appointment; patient will be notified if a sooner appointment becomes available

## 2023-08-19 NOTE — PT/OT/SLP PROGRESS
"Physical Therapy Treatment    Patient Name:  Marely Hamilton   MRN:  [id number]    Recommendations:     Discharge Recommendations:  (moderate intensity therapy)  Discharge Equipment Recommendations: to be determined by next level of care  Barriers to discharge: None    Assessment:     Marely Hamilton is a 58 y.o. female admitted with a medical diagnosis of Severe sepsis.  She presents with the following impairments/functional limitations: weakness, impaired endurance, impaired self care skills, impaired functional mobility, gait instability, impaired balance, decreased safety awareness, decreased lower extremity function, decreased upper extremity function, pain, edema, impaired skin, decreased ROM, impaired coordination, impaired cognition, decreased coordination, impaired joint extensibility     Rehab Prognosis: Fair; patient would benefit from acute skilled PT services to address these deficits and reach maximum level of function.    Recent Surgery: * No surgery found *      Plan:     During this hospitalization, patient to be seen 3 x/week to address the identified rehab impairments via gait training, therapeutic activities, therapeutic exercises, neuromuscular re-education and progress toward the following goals:    Plan of Care Expires:  09/16/23    Subjective     Chief Complaint: "I feel OK."  Patient/Family Comments/goals: return to PLOF  Pain/Comfort:  Pain Rating 1: 0/10  Pain Rating Post-Intervention 1: 0/10      Objective:     Communicated with nurse prior to session.  Patient found HOB elevated with bed alarm, telemetry, baugh catheter upon PT entry to room.     General Precautions: Standard, fall  Orthopedic Precautions: N/A  Braces: N/A  Respiratory Status: Room air     Functional Mobility:  Bed Mobility:     Scooting: moderate assistance and to EOB; dep A x 2 to HOB  Supine to Sit: maximal assistance and of 2 persons  Sit to Supine: maximal assistance and of 2 persons      AM-PAC 6 CLICK " MOBILITY  Turning over in bed (including adjusting bedclothes, sheets and blankets)?: 3  Sitting down on and standing up from a chair with arms (e.g., wheelchair, bedside commode, etc.): 1  Moving from lying on back to sitting on the side of the bed?: 2  Moving to and from a bed to a chair (including a wheelchair)?: 1  Need to walk in hospital room?: 1  Climbing 3-5 steps with a railing?: 1  Basic Mobility Total Score: 9       Treatment & Education:  Co-tx with OT in anticipation of lack of activity tolerance and increased assistance needed for safety; Patient performed all transitions and activities with increased time/effort and VCs for technique; pt sup to sit with maxA x 2 to sit EOB; pt sat ~10 min with SBA->modA (increased balance assist required during BLE therex-APs x 20, ankle circles x 10, heel slides x 10, seated FAQ x 10, foot presses into pillow x 10; after 10 min, pt returned herself to R sidelying and rested ~5 min before returning to sit for another ~8 min; pt performed WBing activities through UEs and LEs with feet on pillow and trunk leans/stretches; pt unable to scoot laterally, therefore, returned to sidelying and scooted with dep A x 2 via drawsheet; pillow placed between LEs for comfort/proper alignment and pt remained in sidelying.    Patient left right sidelying with all lines intact, call button in reach, and bed alarm on..    GOALS:   Multidisciplinary Problems       Physical Therapy Goals          Problem: Physical Therapy    Goal Priority Disciplines Outcome Goal Variances Interventions   Physical Therapy Goal     PT, PT/OT Ongoing, Progressing     Description: Goals to be met by: 2023     Patient will increase functional independence with mobility by performin. Supine to sit with MInimal Assistance  2. Sit to supine with MInimal Assistance  3. Sit to stand transfer with Moderate Assistance using Rolling Walker  4. Bed to chair transfer with Moderate Assistance using Rolling  Walker  5. Gait  x 20 feet with Moderate Assistance using Rolling Walker.   6. Stand for 3 minutes with Minimal Assistance and Moderate Assistance using Rolling Walker  7. Lower extremity exercise program x10 reps with assistance as needed                         Time Tracking:     PT Received On: 08/18/23  PT Start Time: 1311     PT Stop Time: 1341  PT Total Time (min): 30 min     Billable Minutes: Therapeutic Activity 15 Therapeutic Exercise       PT/PTA: PT     Number of PTA visits since last PT visit: 0     08/18/2023

## 2023-08-19 NOTE — PLAN OF CARE
Problem: Physical Therapy  Goal: Physical Therapy Goal  Description: Goals to be met by: 2023     Patient will increase functional independence with mobility by performin. Supine to sit with MInimal Assistance  2. Sit to supine with MInimal Assistance  3. Sit to stand transfer with Moderate Assistance using Rolling Walker  4. Bed to chair transfer with Moderate Assistance using Rolling Walker  5. Gait  x 20 feet with Moderate Assistance using Rolling Walker.   6. Stand for 3 minutes with Minimal Assistance and Moderate Assistance using Rolling Walker  7. Lower extremity exercise program x10 reps with assistance as needed    Outcome: Ongoing, Progressing   Patient performed all transitions and activities with increased time/effort and VCs for technique; pt sup to sit with maxA x 2 to sit EOB; pt sat ~10 min with SBA->modA (increased balance assist required during BLE therex; after 10 min, pt returned herself to R sidelying and rested ~5 min before returning to sit for another ~8 min; pt performed WBing activities through UEs and LEs with feet on pillow; pt unable to scoot laterally, therefore, returned to sidelying and scooted with dep A x 2 via drawsheet; pillow placed between LEs for comfort/proper alignment and pt remained in sidelying; will cont to progress as tolerated.

## 2023-08-30 NOTE — ASSESSMENT & PLAN NOTE
Continue lactulose   See Non-convulsive status epilepticus   Perineural Invasion (For Histology - Be Specific If Possible): absent

## 2023-08-31 ENCOUNTER — HOSPITAL ENCOUNTER (INPATIENT)
Facility: HOSPITAL | Age: 58
LOS: 6 days | Discharge: NURSING, REHAB OR LONG-TERM CARE | DRG: 441 | End: 2023-09-07
Attending: EMERGENCY MEDICINE | Admitting: STUDENT IN AN ORGANIZED HEALTH CARE EDUCATION/TRAINING PROGRAM
Payer: MEDICARE

## 2023-08-31 DIAGNOSIS — K70.30 LAENNEC'S CIRRHOSIS: ICD-10-CM

## 2023-08-31 DIAGNOSIS — R06.02 SHORTNESS OF BREATH: ICD-10-CM

## 2023-08-31 DIAGNOSIS — R14.0 ABDOMINAL DISTENTION: ICD-10-CM

## 2023-08-31 DIAGNOSIS — K76.82 HEPATIC ENCEPHALOPATHY: Primary | ICD-10-CM

## 2023-08-31 DIAGNOSIS — E72.20 HYPERAMMONEMIA: ICD-10-CM

## 2023-08-31 LAB
ALBUMIN SERPL BCP-MCNC: 1.7 G/DL (ref 3.5–5.2)
ALP SERPL-CCNC: 184 U/L (ref 55–135)
ALT SERPL W/O P-5'-P-CCNC: 12 U/L (ref 10–44)
AMMONIA PLAS-SCNC: 79 UMOL/L (ref 10–50)
ANION GAP SERPL CALC-SCNC: 8 MMOL/L (ref 8–16)
APTT PPP: 33.4 SEC (ref 21–32)
AST SERPL-CCNC: 33 U/L (ref 10–40)
BACTERIA #/AREA URNS AUTO: ABNORMAL /HPF
BASOPHILS # BLD AUTO: 0.02 K/UL (ref 0–0.2)
BASOPHILS NFR BLD: 0.8 % (ref 0–1.9)
BILIRUB SERPL-MCNC: 3 MG/DL (ref 0.1–1)
BILIRUB UR QL STRIP: NEGATIVE
BNP SERPL-MCNC: 79 PG/ML (ref 0–99)
BUN SERPL-MCNC: 4 MG/DL (ref 6–20)
CALCIUM SERPL-MCNC: 7.7 MG/DL (ref 8.7–10.5)
CHLORIDE SERPL-SCNC: 115 MMOL/L (ref 95–110)
CLARITY UR REFRACT.AUTO: ABNORMAL
CO2 SERPL-SCNC: 19 MMOL/L (ref 23–29)
COLOR UR AUTO: YELLOW
CREAT SERPL-MCNC: 0.5 MG/DL (ref 0.5–1.4)
DIFFERENTIAL METHOD: ABNORMAL
EOSINOPHIL # BLD AUTO: 0.1 K/UL (ref 0–0.5)
EOSINOPHIL NFR BLD: 3.8 % (ref 0–8)
ERYTHROCYTE [DISTWIDTH] IN BLOOD BY AUTOMATED COUNT: 17.1 % (ref 11.5–14.5)
EST. GFR  (NO RACE VARIABLE): >60 ML/MIN/1.73 M^2
GLUCOSE SERPL-MCNC: 146 MG/DL (ref 70–110)
GLUCOSE UR QL STRIP: NEGATIVE
HCT VFR BLD AUTO: 24.7 % (ref 37–48.5)
HGB BLD-MCNC: 8 G/DL (ref 12–16)
HGB UR QL STRIP: ABNORMAL
HYALINE CASTS UR QL AUTO: 0 /LPF
IMM GRANULOCYTES # BLD AUTO: 0.01 K/UL (ref 0–0.04)
IMM GRANULOCYTES NFR BLD AUTO: 0.4 % (ref 0–0.5)
INR PPP: 1.8 (ref 0.8–1.2)
KETONES UR QL STRIP: NEGATIVE
LEUKOCYTE ESTERASE UR QL STRIP: ABNORMAL
LYMPHOCYTES # BLD AUTO: 0.4 K/UL (ref 1–4.8)
LYMPHOCYTES NFR BLD: 16.7 % (ref 18–48)
MAGNESIUM SERPL-MCNC: 1.4 MG/DL (ref 1.6–2.6)
MCH RBC QN AUTO: 34.9 PG (ref 27–31)
MCHC RBC AUTO-ENTMCNC: 32.4 G/DL (ref 32–36)
MCV RBC AUTO: 108 FL (ref 82–98)
MICROSCOPIC COMMENT: ABNORMAL
MONOCYTES # BLD AUTO: 0.3 K/UL (ref 0.3–1)
MONOCYTES NFR BLD: 10.5 % (ref 4–15)
NEUTROPHILS # BLD AUTO: 1.6 K/UL (ref 1.8–7.7)
NEUTROPHILS NFR BLD: 67.8 % (ref 38–73)
NITRITE UR QL STRIP: NEGATIVE
NON-SQ EPI CELLS #/AREA URNS AUTO: 3 /HPF
NRBC BLD-RTO: 0 /100 WBC
PH UR STRIP: 6 [PH] (ref 5–8)
PLATELET # BLD AUTO: 63 K/UL (ref 150–450)
PMV BLD AUTO: 10.1 FL (ref 9.2–12.9)
POTASSIUM SERPL-SCNC: 2.5 MMOL/L (ref 3.5–5.1)
PROT SERPL-MCNC: 5.2 G/DL (ref 6–8.4)
PROT UR QL STRIP: ABNORMAL
PROTHROMBIN TIME: 18.2 SEC (ref 9–12.5)
RBC # BLD AUTO: 2.29 M/UL (ref 4–5.4)
RBC #/AREA URNS AUTO: 99 /HPF (ref 0–4)
SODIUM SERPL-SCNC: 142 MMOL/L (ref 136–145)
SP GR UR STRIP: 1.02 (ref 1–1.03)
SQUAMOUS #/AREA URNS AUTO: 3 /HPF
TROPONIN I SERPL DL<=0.01 NG/ML-MCNC: <0.006 NG/ML (ref 0–0.03)
URN SPEC COLLECT METH UR: ABNORMAL
WBC # BLD AUTO: 2.39 K/UL (ref 3.9–12.7)
WBC #/AREA URNS AUTO: >100 /HPF (ref 0–5)
WBC CLUMPS UR QL AUTO: ABNORMAL
YEAST UR QL AUTO: ABNORMAL

## 2023-08-31 PROCEDURE — 85730 THROMBOPLASTIN TIME PARTIAL: CPT

## 2023-08-31 PROCEDURE — 93005 ELECTROCARDIOGRAM TRACING: CPT

## 2023-08-31 PROCEDURE — 84484 ASSAY OF TROPONIN QUANT: CPT | Performed by: EMERGENCY MEDICINE

## 2023-08-31 PROCEDURE — 82140 ASSAY OF AMMONIA: CPT | Performed by: EMERGENCY MEDICINE

## 2023-08-31 PROCEDURE — 83880 ASSAY OF NATRIURETIC PEPTIDE: CPT | Performed by: EMERGENCY MEDICINE

## 2023-08-31 PROCEDURE — 93010 ELECTROCARDIOGRAM REPORT: CPT | Mod: ,,, | Performed by: INTERNAL MEDICINE

## 2023-08-31 PROCEDURE — 81001 URINALYSIS AUTO W/SCOPE: CPT

## 2023-08-31 PROCEDURE — 51702 INSERT TEMP BLADDER CATH: CPT

## 2023-08-31 PROCEDURE — 80053 COMPREHEN METABOLIC PANEL: CPT | Performed by: EMERGENCY MEDICINE

## 2023-08-31 PROCEDURE — 85025 COMPLETE CBC W/AUTO DIFF WBC: CPT | Performed by: EMERGENCY MEDICINE

## 2023-08-31 PROCEDURE — G0378 HOSPITAL OBSERVATION PER HR: HCPCS

## 2023-08-31 PROCEDURE — 87086 URINE CULTURE/COLONY COUNT: CPT

## 2023-08-31 PROCEDURE — 25000003 PHARM REV CODE 250

## 2023-08-31 PROCEDURE — 83735 ASSAY OF MAGNESIUM: CPT | Performed by: EMERGENCY MEDICINE

## 2023-08-31 PROCEDURE — 63600175 PHARM REV CODE 636 W HCPCS

## 2023-08-31 PROCEDURE — 85610 PROTHROMBIN TIME: CPT

## 2023-08-31 PROCEDURE — 99285 EMERGENCY DEPT VISIT HI MDM: CPT | Mod: 25

## 2023-08-31 PROCEDURE — 93010 EKG 12-LEAD: ICD-10-PCS | Mod: ,,, | Performed by: INTERNAL MEDICINE

## 2023-08-31 PROCEDURE — P9612 CATHETERIZE FOR URINE SPEC: HCPCS

## 2023-08-31 RX ORDER — ONDANSETRON 2 MG/ML
4 INJECTION INTRAMUSCULAR; INTRAVENOUS EVERY 8 HOURS PRN
Status: DISCONTINUED | OUTPATIENT
Start: 2023-09-01 | End: 2023-09-08 | Stop reason: HOSPADM

## 2023-08-31 RX ORDER — IBUPROFEN 200 MG
16 TABLET ORAL
Status: DISCONTINUED | OUTPATIENT
Start: 2023-09-01 | End: 2023-09-08 | Stop reason: HOSPADM

## 2023-08-31 RX ORDER — LACTULOSE 10 G/15ML
10 SOLUTION ORAL
Status: COMPLETED | OUTPATIENT
Start: 2023-08-31 | End: 2023-08-31

## 2023-08-31 RX ORDER — PROCHLORPERAZINE EDISYLATE 5 MG/ML
5 INJECTION INTRAMUSCULAR; INTRAVENOUS EVERY 6 HOURS PRN
Status: DISCONTINUED | OUTPATIENT
Start: 2023-09-01 | End: 2023-09-08 | Stop reason: HOSPADM

## 2023-08-31 RX ORDER — LACTULOSE 10 G/15ML
30 SOLUTION ORAL
Status: COMPLETED | OUTPATIENT
Start: 2023-08-31 | End: 2023-08-31

## 2023-08-31 RX ORDER — SODIUM CHLORIDE 0.9 % (FLUSH) 0.9 %
10 SYRINGE (ML) INJECTION
Status: DISCONTINUED | OUTPATIENT
Start: 2023-09-01 | End: 2023-09-08 | Stop reason: HOSPADM

## 2023-08-31 RX ORDER — NALOXONE HCL 0.4 MG/ML
0.02 VIAL (ML) INJECTION
Status: DISCONTINUED | OUTPATIENT
Start: 2023-09-01 | End: 2023-09-08 | Stop reason: HOSPADM

## 2023-08-31 RX ORDER — POTASSIUM CHLORIDE 20 MEQ/1
40 TABLET, EXTENDED RELEASE ORAL
Status: COMPLETED | OUTPATIENT
Start: 2023-09-01 | End: 2023-09-01

## 2023-08-31 RX ORDER — IBUPROFEN 200 MG
24 TABLET ORAL
Status: DISCONTINUED | OUTPATIENT
Start: 2023-09-01 | End: 2023-09-08 | Stop reason: HOSPADM

## 2023-08-31 RX ORDER — MAGNESIUM SULFATE HEPTAHYDRATE 40 MG/ML
2 INJECTION, SOLUTION INTRAVENOUS
Status: COMPLETED | OUTPATIENT
Start: 2023-08-31 | End: 2023-08-31

## 2023-08-31 RX ORDER — TALC
6 POWDER (GRAM) TOPICAL NIGHTLY PRN
Status: DISCONTINUED | OUTPATIENT
Start: 2023-09-01 | End: 2023-09-08 | Stop reason: HOSPADM

## 2023-08-31 RX ADMIN — LACTULOSE 30 G: 20 SOLUTION ORAL at 06:08

## 2023-08-31 RX ADMIN — MAGNESIUM SULFATE 2 G: 2 INJECTION INTRAVENOUS at 08:08

## 2023-08-31 RX ADMIN — LACTULOSE 10 G: 20 SOLUTION ORAL at 05:08

## 2023-08-31 RX ADMIN — POTASSIUM BICARBONATE 20 MEQ: 391 TABLET, EFFERVESCENT ORAL at 07:08

## 2023-08-31 NOTE — PROVIDER PROGRESS NOTES - EMERGENCY DEPT.
"Encounter Date: 8/31/2023    ED Physician Progress Notes        Physician Note:   Medical screening exam completed.  I have conducted a focused provider triage encounter, findings are as follows:    Brief history of present illness:  Sent in for shortness of breath, high ammonia levels and constipation.  Patient is complaining of bilateral feet pain as well as pain in her but    -------------------------              08/31/23                    1424        -------------------------   BP:         100/62        Pulse:        84          Resp:         18          Temp:   98.6 °F (37 °C)   TempSrc:     Oral         SpO2:         96%         Height: 5' 2" (1.575 m)  -------------------------    Pertinent physical exam:  NAD, tried appearing     Brief workup plan:  Screening labs.     Preliminary workup initiated; this workup will be continued and followed by the physician or advanced practice provider that is assigned to the patient when roomed.          "

## 2023-08-31 NOTE — ED PROVIDER NOTES
"Encounter Date: 8/31/2023       History     Chief Complaint   Patient presents with    Shortness of Breath     Arrives via EMS from [facility] for SOB, has high ammonia levels, constipated. Pitting edema to BLE. On 2L NC      58 year old female with PMH as noted below presents with abdominal distention beginning two weeks ago. Pt reports she has been at [facility] for two weeks, and has had abdominal distention since arriving at [facility]. Pt attributes this to the salty, fatty foods she is eating there. Pt denies chest pain, SOB, abdominal pain, dysuria, constipation, diarrhea. Pt reports her last bowel movement was yesterday and that it "came out in the urine tube and they told me it was disgusting.'" On examination in ED, baugh catheter draining what appears to be urine only, no signs of fecal material in baugh or in urine bag. Pt reports history of anorexia, but no documentation of such a diagnosis. Nursing staff spoke with family on the phone, who state pt has not had a diagnosis of anorexia previously.        Review of patient's allergies indicates:   Allergen Reactions    Sulfa (sulfonamide antibiotics) Rash    Codeine Nausea And Vomiting     Past Medical History:   Diagnosis Date    Addiction to drug     Alcohol abuse     Alcohol abuse, in remission 6/15/2015    14.5 weeks ago; AA weekly    Anemia     Anxiety 6/15/2015    Behavioral problem     Bipolar disorder     Bipolar disorder in remission 6/15/2015    Cirrhosis, Laennec's 6/15/2015    Depression     Encounter for blood transfusion     Epistaxis 6/15/2015    Fatigue     Glaucoma     Hematuria     Hepatic encephalopathy 6/15/2015    Hepatic enlargement     History of psychiatric care     History of psychiatric hospitalization     History of seizure 6/15/2015    1    hx of intentional Tylenol overdose 6/15/2015    2005- situational and hx of bipolar    Hx of psychiatric care     Macrocytic anemia 9/18/2015    6 units PRBC since June 2015    Jeana  "    Osteoarthritis     Other ascites 6/15/2015    Psychiatric exam requested by authority     Psychiatric problem     Psychosis 9/26/2019    Renal disorder     Seizures     Self-harming behavior     Suicide attempt     Therapy     Thrombocytopenia 6/15/2015     Past Surgical History:   Procedure Laterality Date    COSMETIC SURGERY      EMBOLIZATION N/A 6/11/2023    Procedure: EMBOLIZATION, BLOOD VESSEL;  Surgeon: Debbie Surgeon;  Location: [facility];  Service: Anesthesiology;  Laterality: N/A;    ESOPHAGOGASTRODUODENOSCOPY      ESOPHAGOGASTRODUODENOSCOPY N/A 7/6/2023    Procedure: EGD (ESOPHAGOGASTRODUODENOSCOPY);  Surgeon: Bernice Guillen MD;  [facility]  Service: Endoscopy;  Laterality: N/A;    ESOPHAGOGASTRODUODENOSCOPY N/A 7/11/2023    Procedure: EGD (ESOPHAGOGASTRODUODENOSCOPY);  Surgeon: Rangel Broussard MD;  [facility]  Service: Endoscopy;  Laterality: N/A;     Family History   Problem Relation Age of Onset    Alcohol abuse Father     Suicide Father     Bipolar disorder Father     Alcohol abuse Sister     Hypertension Mother     Alcohol abuse Paternal Grandfather     Drug abuse Neg Hx     Dementia Neg Hx      Social History     Tobacco Use    Smoking status: Never    Smokeless tobacco: Never   Substance Use Topics    Alcohol use: Not Currently     Comment: hx of ETOH abuse with cirrhosis of liver    Drug use: No     Review of Systems  See HPI    Physical Exam     Initial Vitals [08/31/23 1424]   BP Pulse Resp Temp SpO2   100/62 84 18 98.6 °F (37 °C) 96 %      MAP       --         Physical Exam    Nursing note and vitals reviewed.  Cardiovascular:  Normal rate and regular rhythm.           Pulmonary/Chest: Breath sounds normal. She has no wheezes. She has no rales.   Abdominal: She exhibits distension. There is no abdominal tenderness. There is no rebound and no guarding.   Genitourinary:    Genitourinary Comments: Catheter in place draining urine. 6 cm x 8 cm area of  erythema with crusting over the left medial thigh.       Skin: Skin is warm and dry.   jaundiced         ED Course   Procedures  Labs Reviewed   CBC W/ AUTO DIFFERENTIAL - Abnormal; Notable for the following components:       Result Value    WBC 2.39 (*)     RBC 2.29 (*)     Hemoglobin 8.0 (*)     Hematocrit 24.7 (*)      (*)     MCH 34.9 (*)     RDW 17.1 (*)     Platelets 63 (*)     Gran # (ANC) 1.6 (*)     Lymph # 0.4 (*)     Lymph % 16.7 (*)     All other components within normal limits   COMPREHENSIVE METABOLIC PANEL - Abnormal; Notable for the following components:    Potassium 2.5 (*)     Chloride 115 (*)     CO2 19 (*)     Glucose 146 (*)     BUN 4 (*)     Calcium 7.7 (*)     Total Protein 5.2 (*)     Albumin 1.7 (*)     Total Bilirubin 3.0 (*)     Alkaline Phosphatase 184 (*)     All other components within normal limits   AMMONIA - Abnormal; Notable for the following components:    Ammonia 79 (*)     All other components within normal limits   APTT - Abnormal; Notable for the following components:    aPTT 33.4 (*)     All other components within normal limits   PROTIME-INR - Abnormal; Notable for the following components:    Prothrombin Time 18.2 (*)     INR 1.8 (*)     All other components within normal limits   MAGNESIUM - Abnormal; Notable for the following components:    Magnesium 1.4 (*)     All other components within normal limits    Narrative:     ADD ON MAGNESIUM PER DR RICKI FONTAINE/ORDER# [id number] @ 19:04   URINALYSIS, REFLEX TO URINE CULTURE - Abnormal; Notable for the following components:    Appearance, UA Hazy (*)     Protein, UA 1+ (*)     Occult Blood UA 3+ (*)     Leukocytes, UA 3+ (*)     All other components within normal limits    Narrative:     Specimen Source->Urine   URINALYSIS MICROSCOPIC - Abnormal; Notable for the following components:    RBC, UA 99 (*)     WBC, UA >100 (*)     WBC Clumps, UA Occasional (*)     Bacteria Many (*)     Yeast, UA Few (*)     Non-Squam Epith 3 (*)      All other components within normal limits    Narrative:     Specimen Source->Urine   CULTURE, URINE   CULTURE, BLOOD   CULTURE, BLOOD   B-TYPE NATRIURETIC PEPTIDE   TROPONIN I   MAGNESIUM     EKG Readings: (Independently Interpreted)   Initial Reading: No STEMI. Previous EKG: Compared with most recent EKG Previous EKG Date: 8/14/23. Rhythm: Normal Sinus Rhythm.       Imaging Results              X-Ray Chest AP Portable (Final result)  Result time 08/31/23 19:59:19      Final result by Willis Loomis MD (08/31/23 19:59:19)                   Impression:      1. Coarse interstitial attenuation could reflect minimal edema noting no large focal consolidation.      Electronically signed by: Willis Loomis MD  Date:    08/31/2023  Time:    19:59               Narrative:    EXAMINATION:  XR CHEST AP PORTABLE    CLINICAL HISTORY:  Shortness of breath    TECHNIQUE:  Single frontal view of the chest was performed.    COMPARISON:  08/14/2023    FINDINGS:  The cardiomediastinal silhouette is not enlarged, magnified by technique.  There is elevation of the right hemidiaphragm..  There is obscuration of the right costophrenic angle, may reflect small effusion versus atelectasis..  The trachea is midline.  The lungs are symmetrically expanded bilaterally with mildly coarse interstitial attenuation.  No large focal consolidation seen.  There is no pneumothorax.  The osseous structures are remarkable for degenerative change.  Vascular coils project over the right upper quadrant.  There is suspected cholelithiasis..                                       Medications   potassium chloride SA CR tablet 40 mEq (has no administration in time range)   sodium chloride 0.9% flush 10 mL (has no administration in time range)   melatonin tablet 6 mg (has no administration in time range)   ondansetron injection 4 mg (has no administration in time range)   prochlorperazine injection Soln 5 mg (has no administration in time range)    naloxone 0.4 mg/mL injection 0.02 mg (has no administration in time range)   glucose chewable tablet 16 g (has no administration in time range)   glucose chewable tablet 24 g (has no administration in time range)   lactulose 20 gram/30 mL solution Soln 10 g (10 g Oral Given 8/31/23 1757)   lactulose 20 gram/30 mL solution Soln 30 g (30 g Oral Given 8/31/23 1802)   potassium bicarbonate disintegrating tablet 20 mEq (20 mEq Oral Given 8/31/23 1912)   magnesium sulfate 2g in water 50mL IVPB (premix) (0 g Intravenous Stopped 8/31/23 2235)     Medical Decision Making  58 year old female with PMH of alcoholic liver cirrhosis presents with abdominal distention in the setting of elevated ammonia.    Problems Addressed:  Abdominal distention:     Details: Distention likely 2/2 alcoholic cirrhosis. No abdominal tenderness, guarding, rebound, or other peritoneal signs. No fever. No concerns for SBP at this time.  Hepatic encephalopathy:     Details: Pt with confabulations on further interview. Reports history of anorexia, which family denies. Pt given lactulose and admitted for encephalopathy.  Hyperammonemia:     Details: Elevated ammonia level in ED. On further evaluation, pt confabulating. Given lactulose.  Laennec's cirrhosis:     Details: History of Laennec's cirrhosis.    Amount and/or Complexity of Data Reviewed  Independent Historian:      Details: Family denies any history of anorexia.  Labs: ordered.     Details: Ammonia elevated.  PTT, PT, INR elevated, but lower than previous lab work over the last three weeks.  Hgb low, in line with previous lab work over the last three weeks.  Potassium 2.5. Magnesium 1.4. Repleted with oral potassium and magnesium.  Bilirubin and alkaline phosphatase elevated.  Radiology: ordered.  ECG/medicine tests: ordered and independent interpretation performed. Decision-making details documented in ED Course.     Details: See EKG section.    Risk  Prescription drug management.  Risk  Details: Pt with multiple home prescription medications, including lactulose, which pt received in ED.              Attending Attestation:   Physician Attestation Statement for Resident:  As the supervising MD   Physician Attestation Statement: I have personally seen and examined this patient.   I agree with the above history.  -:   As the supervising MD I agree with the above PE.     As the supervising MD I agree with the above treatment, course, plan, and disposition.   -: Patient with known cirrhosis on lactulose presents with waxing and waning confusion.  Confused during my interview.  Additional lactulose given.  Will admit to medicine for management of hepatic encephalopathy.  Discussed with hospital medicine.                   ED Course as of 08/31/23 2351   Thu Aug 31, 2023   1817 EKG 12-lead  EKG shows normal sinus rhythm and no acute ischemia per my independent interpretation.     [DC]      ED Course User Index  [DC] Driss Ames MD                    Clinical Impression:   Final diagnoses:  [R14.0] Abdominal distention  [E72.20] Hyperammonemia  [K70.30] Laennec's cirrhosis  [R06.02] Shortness of breath  [K76.82] Hepatic encephalopathy (Primary)        ED Disposition Condition    Observation Stable                Dave Greene MD  Resident  08/31/23 0281       Driss Ames MD  09/01/23 9482

## 2023-08-31 NOTE — ED NOTES
Patient identifiers for Marely Hamilton 58 y.o. female checked and correct.  Chief Complaint   Patient presents with    Shortness of Breath     Arrives via EMS from [facility] for SOB, has high ammonia levels, constipated. Pitting edema to BLE. On 2L NC      Past Medical History:   Diagnosis Date    Addiction to drug     Alcohol abuse     Alcohol abuse, in remission 6/15/2015    14.5 weeks ago; AA weekly    Anemia     Anxiety 6/15/2015    Behavioral problem     Bipolar disorder     Bipolar disorder in remission 6/15/2015    Cirrhosis, Laennec's 6/15/2015    Depression     Encounter for blood transfusion     Epistaxis 6/15/2015    Fatigue     Glaucoma     Hematuria     Hepatic encephalopathy 6/15/2015    Hepatic enlargement     History of psychiatric care     History of psychiatric hospitalization     History of seizure 6/15/2015    1    hx of intentional Tylenol overdose 6/15/2015    2005- situational and hx of bipolar    Hx of psychiatric care     Macrocytic anemia 9/18/2015    6 units PRBC since June 2015    Jeana     Osteoarthritis     Other ascites 6/15/2015    Psychiatric exam requested by authority     Psychiatric problem     Psychosis 9/26/2019    Renal disorder     Seizures     Self-harming behavior     Suicide attempt     Therapy     Thrombocytopenia 6/15/2015     Allergies reported:   Review of patient's allergies indicates:   Allergen Reactions    Sulfa (sulfonamide antibiotics) Rash    Codeine Nausea And Vomiting         LOC: Patient is awake, alert, and aware of environment with an appropriate affect. Patient is oriented x 4 and speaking appropriately.  APPEARANCE: Patient resting comfortably and in no acute distress. Patient is clean and well groomed, patient's clothing is properly fastened.  HEENT: - JVD, + midline trach  SKIN: The skin is warm and dry. Patient has normal skin turgor and moist mucus membranes.   MUSKULOSKELETAL: Patient is moving all extremities well, no obvious deformities noted.  Pulses intact.   RESPIRATORY: Airway is open and patent. Respirations are spontaneous and non-labored with normal effort and rate. = CBBS. Arrived on 2lpm O2 via NC. Endorses SOB  CARDIAC: Patient has a normal rate and rhythm. 84 on cardiac monitor. Bilateral LE edema. + 2 bilateral pedal and radial pulses, < 3 s cap refill.  ABDOMEN: Very distended abdomen. Soft and tender upon palpation. Pt arrives with baugh catheter.  NEUROLOGICAL: pupils 4 mm, PERRL. Facial expression is symmetrical. Hand grasps are equal bilaterally. Normal sensation in all extremities when touched with finger.

## 2023-08-31 NOTE — Clinical Note
Diagnosis: Abdominal distention [865830]   Future Attending Provider: CRESENCIO EVANS [[id number]]   Admitting Provider:: CRESENCIO EVANS [[id number]]

## 2023-08-31 NOTE — ED TRIAGE NOTES
57 y/o F presents to ER with c/c SOB. Pt states that her abd has become very distended with increasing SOB for 2 weeks since she was placed in [facility]. Pt denies c/p, N/V/D, fevers and chills. Pt has h/x anorexia and EtOH.

## 2023-09-01 PROBLEM — N30.00 ACUTE CYSTITIS: Status: ACTIVE | Noted: 2023-09-01

## 2023-09-01 LAB
ALBUMIN SERPL BCP-MCNC: 1.9 G/DL (ref 3.5–5.2)
ALP SERPL-CCNC: 165 U/L (ref 55–135)
ALT SERPL W/O P-5'-P-CCNC: 16 U/L (ref 10–44)
ANION GAP SERPL CALC-SCNC: 11 MMOL/L (ref 8–16)
AST SERPL-CCNC: 38 U/L (ref 10–40)
BASOPHILS # BLD AUTO: 0.02 K/UL (ref 0–0.2)
BASOPHILS NFR BLD: 0.9 % (ref 0–1.9)
BILIRUB SERPL-MCNC: 4.3 MG/DL (ref 0.1–1)
BUN SERPL-MCNC: 5 MG/DL (ref 6–20)
CALCIUM SERPL-MCNC: 7.9 MG/DL (ref 8.7–10.5)
CHLORIDE SERPL-SCNC: 114 MMOL/L (ref 95–110)
CO2 SERPL-SCNC: 18 MMOL/L (ref 23–29)
CREAT SERPL-MCNC: 0.5 MG/DL (ref 0.5–1.4)
DIFFERENTIAL METHOD: ABNORMAL
EOSINOPHIL # BLD AUTO: 0.1 K/UL (ref 0–0.5)
EOSINOPHIL NFR BLD: 4.2 % (ref 0–8)
ERYTHROCYTE [DISTWIDTH] IN BLOOD BY AUTOMATED COUNT: 17.2 % (ref 11.5–14.5)
EST. GFR  (NO RACE VARIABLE): >60 ML/MIN/1.73 M^2
GLUCOSE SERPL-MCNC: 106 MG/DL (ref 70–110)
HCT VFR BLD AUTO: 28.4 % (ref 37–48.5)
HGB BLD-MCNC: 8.8 G/DL (ref 12–16)
IMM GRANULOCYTES # BLD AUTO: 0 K/UL (ref 0–0.04)
IMM GRANULOCYTES NFR BLD AUTO: 0 % (ref 0–0.5)
LYMPHOCYTES # BLD AUTO: 0.6 K/UL (ref 1–4.8)
LYMPHOCYTES NFR BLD: 26.6 % (ref 18–48)
MAGNESIUM SERPL-MCNC: 1.8 MG/DL (ref 1.6–2.6)
MCH RBC QN AUTO: 34.1 PG (ref 27–31)
MCHC RBC AUTO-ENTMCNC: 31 G/DL (ref 32–36)
MCV RBC AUTO: 110 FL (ref 82–98)
MONOCYTES # BLD AUTO: 0.3 K/UL (ref 0.3–1)
MONOCYTES NFR BLD: 12.6 % (ref 4–15)
NEUTROPHILS # BLD AUTO: 1.2 K/UL (ref 1.8–7.7)
NEUTROPHILS NFR BLD: 55.7 % (ref 38–73)
NRBC BLD-RTO: 0 /100 WBC
PLATELET # BLD AUTO: 67 K/UL (ref 150–450)
PMV BLD AUTO: 9.7 FL (ref 9.2–12.9)
POTASSIUM SERPL-SCNC: 3 MMOL/L (ref 3.5–5.1)
PROT SERPL-MCNC: 5.6 G/DL (ref 6–8.4)
RBC # BLD AUTO: 2.58 M/UL (ref 4–5.4)
SODIUM SERPL-SCNC: 143 MMOL/L (ref 136–145)
WBC # BLD AUTO: 2.14 K/UL (ref 3.9–12.7)

## 2023-09-01 PROCEDURE — 85025 COMPLETE CBC W/AUTO DIFF WBC: CPT | Performed by: HOSPITALIST

## 2023-09-01 PROCEDURE — 20600001 HC STEP DOWN PRIVATE ROOM

## 2023-09-01 PROCEDURE — 25000003 PHARM REV CODE 250: Performed by: HOSPITALIST

## 2023-09-01 PROCEDURE — 25000003 PHARM REV CODE 250: Performed by: STUDENT IN AN ORGANIZED HEALTH CARE EDUCATION/TRAINING PROGRAM

## 2023-09-01 PROCEDURE — 80053 COMPREHEN METABOLIC PANEL: CPT | Performed by: HOSPITALIST

## 2023-09-01 PROCEDURE — 87040 BLOOD CULTURE FOR BACTERIA: CPT | Mod: 59 | Performed by: HOSPITALIST

## 2023-09-01 PROCEDURE — 36415 COLL VENOUS BLD VENIPUNCTURE: CPT | Performed by: HOSPITALIST

## 2023-09-01 PROCEDURE — 83735 ASSAY OF MAGNESIUM: CPT | Performed by: HOSPITALIST

## 2023-09-01 PROCEDURE — 99222 PR INITIAL HOSPITAL CARE,LEVL II: ICD-10-PCS | Mod: ,,, | Performed by: HOSPITALIST

## 2023-09-01 PROCEDURE — 99222 1ST HOSP IP/OBS MODERATE 55: CPT | Mod: ,,, | Performed by: HOSPITALIST

## 2023-09-01 PROCEDURE — 63600175 PHARM REV CODE 636 W HCPCS: Performed by: HOSPITALIST

## 2023-09-01 PROCEDURE — 51702 INSERT TEMP BLADDER CATH: CPT

## 2023-09-01 RX ORDER — OLANZAPINE 5 MG/1
5 TABLET ORAL NIGHTLY
Status: DISCONTINUED | OUTPATIENT
Start: 2023-09-01 | End: 2023-09-01

## 2023-09-01 RX ORDER — PANTOPRAZOLE SODIUM 40 MG/1
40 FOR SUSPENSION ORAL 2 TIMES DAILY
Status: DISCONTINUED | OUTPATIENT
Start: 2023-09-01 | End: 2023-09-08 | Stop reason: HOSPADM

## 2023-09-01 RX ORDER — POTASSIUM CHLORIDE 20 MEQ/1
40 TABLET, EXTENDED RELEASE ORAL ONCE
Status: COMPLETED | OUTPATIENT
Start: 2023-09-01 | End: 2023-09-01

## 2023-09-01 RX ORDER — ALPRAZOLAM 0.25 MG/1
0.25 TABLET ORAL 2 TIMES DAILY
Status: ON HOLD | COMMUNITY
End: 2023-09-05 | Stop reason: SDUPTHER

## 2023-09-01 RX ORDER — OLANZAPINE 5 MG/1
5 TABLET ORAL EVERY 6 HOURS PRN
Status: DISCONTINUED | OUTPATIENT
Start: 2023-09-01 | End: 2023-09-08 | Stop reason: HOSPADM

## 2023-09-01 RX ORDER — LITHIUM CARBONATE 300 MG/1
300 TABLET, FILM COATED, EXTENDED RELEASE ORAL NIGHTLY
Status: DISCONTINUED | OUTPATIENT
Start: 2023-09-01 | End: 2023-09-08 | Stop reason: HOSPADM

## 2023-09-01 RX ORDER — LEVETIRACETAM 100 MG/ML
1000 SOLUTION ORAL 2 TIMES DAILY
Status: DISCONTINUED | OUTPATIENT
Start: 2023-09-01 | End: 2023-09-08 | Stop reason: HOSPADM

## 2023-09-01 RX ORDER — LACTULOSE 10 G/15ML
30 SOLUTION ORAL 3 TIMES DAILY
Status: DISCONTINUED | OUTPATIENT
Start: 2023-09-01 | End: 2023-09-08 | Stop reason: HOSPADM

## 2023-09-01 RX ORDER — FUROSEMIDE 20 MG/1
20 TABLET ORAL DAILY
Status: DISCONTINUED | OUTPATIENT
Start: 2023-09-01 | End: 2023-09-08 | Stop reason: HOSPADM

## 2023-09-01 RX ORDER — FUROSEMIDE 20 MG/1
20 TABLET ORAL DAILY
Status: ON HOLD | COMMUNITY
End: 2023-12-08 | Stop reason: HOSPADM

## 2023-09-01 RX ADMIN — CEFTRIAXONE 2 G: 2 INJECTION, POWDER, FOR SOLUTION INTRAMUSCULAR; INTRAVENOUS at 02:09

## 2023-09-01 RX ADMIN — RIFAXIMIN 550 MG: 550 TABLET ORAL at 08:09

## 2023-09-01 RX ADMIN — LITHIUM CARBONATE 300 MG: 300 TABLET, FILM COATED, EXTENDED RELEASE ORAL at 08:09

## 2023-09-01 RX ADMIN — FUROSEMIDE 20 MG: 20 TABLET ORAL at 09:09

## 2023-09-01 RX ADMIN — RIFAXIMIN 550 MG: 550 TABLET ORAL at 09:09

## 2023-09-01 RX ADMIN — POTASSIUM CHLORIDE 40 MEQ: 1500 TABLET, EXTENDED RELEASE ORAL at 04:09

## 2023-09-01 RX ADMIN — LITHIUM CARBONATE 300 MG: 300 TABLET, FILM COATED, EXTENDED RELEASE ORAL at 04:09

## 2023-09-01 RX ADMIN — POTASSIUM CHLORIDE 40 MEQ: 1500 TABLET, EXTENDED RELEASE ORAL at 02:09

## 2023-09-01 RX ADMIN — LACTULOSE 30 G: 20 SOLUTION ORAL at 03:09

## 2023-09-01 RX ADMIN — POTASSIUM CHLORIDE 40 MEQ: 1500 TABLET, EXTENDED RELEASE ORAL at 09:09

## 2023-09-01 RX ADMIN — LEVETIRACETAM 1000 MG: 100 SOLUTION ORAL at 08:09

## 2023-09-01 RX ADMIN — LEVETIRACETAM 1000 MG: 100 SOLUTION ORAL at 09:09

## 2023-09-01 RX ADMIN — PANTOPRAZOLE SODIUM 40 MG: 40 GRANULE, DELAYED RELEASE ORAL at 08:09

## 2023-09-01 RX ADMIN — LACTULOSE 30 G: 20 SOLUTION ORAL at 08:09

## 2023-09-01 RX ADMIN — LACTULOSE 30 G: 20 SOLUTION ORAL at 09:09

## 2023-09-01 RX ADMIN — PANTOPRAZOLE SODIUM 40 MG: 40 GRANULE, DELAYED RELEASE ORAL at 09:09

## 2023-09-01 NOTE — H&P
"[facility] - Telemetry [facility] Medicine  History & Physical    Patient Name: Marely Hamilton  MRN: [id number]  Patient Class: OP- Observation  Admission Date: 8/31/2023  Attending Physician: Pancho Harris MD   Primary Care Provider: Viktor Ross MD         Patient information was obtained from patient, past medical records and ER records.     Subjective:     Principal Problem:Hepatic encephalopathy    Chief Complaint:   Chief Complaint   Patient presents with    Shortness of Breath     Arrives via EMS from [facility] for SOB, has high ammonia levels, constipated. Pitting edema to BLE. On 2L NC         HPI: 59 yo F with PMHx alcoholic cirrhosis, recurrent GI bleeds, and chronic LE DVT s/p IVC filter placement who presents to the ED from [facility] for reported "shortness of breath, high ammonia levels and constipation x a few days". Pt. Mildly confused at time of my interview, so chronology of events is limited, but the patietn reports that she has had worsening swelling in her feet and abdomen as well as some SOB. She believes the fluid in her abdomen and legs is causing her to feel weak and SOB. She also has had constipation despite being on lactulose for HE. She denies any fevers, chills, nausea, or vomiting. Of note, pt. Recently admitted to [facility] for E.coli bacteremia thought to be related to a UTI. ID recommended midline placement for ceftriaxone 2gm IV daily for total of 2 wks from 8/14 to 8/28/23. Pt. Reports that she completed this and her midline was removed.         Past Medical History:   Diagnosis Date    Addiction to drug     Alcohol abuse     Alcohol abuse, in remission 6/15/2015    14.5 weeks ago; AA weekly    Anemia     Anxiety 6/15/2015    Behavioral problem     Bipolar disorder     Bipolar disorder in remission 6/15/2015    Cirrhosis, Laennec's 6/15/2015    Depression     Encounter for blood transfusion     Epistaxis 6/15/2015    Fatigue     Glaucoma     Hematuria  "    Hepatic encephalopathy 6/15/2015    Hepatic enlargement     History of psychiatric care     History of psychiatric hospitalization     History of seizure 6/15/2015    1    hx of intentional Tylenol overdose 6/15/2015    2005- situational and hx of bipolar    Hx of psychiatric care     Macrocytic anemia 9/18/2015    6 units PRBC since June 2015    Jeana     Osteoarthritis     Other ascites 6/15/2015    Psychiatric exam requested by authority     Psychiatric problem     Psychosis 9/26/2019    Renal disorder     Seizures     Self-harming behavior     Suicide attempt     Therapy     Thrombocytopenia 6/15/2015       Past Surgical History:   Procedure Laterality Date    COSMETIC SURGERY      EMBOLIZATION N/A 6/11/2023    Procedure: EMBOLIZATION, BLOOD VESSEL;  Surgeon: Debbie Surgeon;  Location: [facility];  Service: Anesthesiology;  Laterality: N/A;    ESOPHAGOGASTRODUODENOSCOPY      ESOPHAGOGASTRODUODENOSCOPY N/A 7/6/2023    Procedure: EGD (ESOPHAGOGASTRODUODENOSCOPY);  Surgeon: Bernice Guillen MD;  Location: [address]);  Service: Endoscopy;  Laterality: N/A;    ESOPHAGOGASTRODUODENOSCOPY N/A 7/11/2023    Procedure: EGD (ESOPHAGOGASTRODUODENOSCOPY);  Surgeon: Rangel Broussard MD;  Location: [address]);  Service: Endoscopy;  Laterality: N/A;       Review of patient's allergies indicates:   Allergen Reactions    Sulfa (sulfonamide antibiotics) Rash    Codeine Nausea And Vomiting       No current facility-administered medications on file prior to encounter.     Current Outpatient Medications on File Prior to Encounter   Medication Sig    ALPRAZolam (XANAX) 0.25 MG tablet Take 0.25 mg by mouth 2 (two) times a day.    ferrous sulfate (FEOSOL) 325 mg (65 mg iron) Tab tablet Take 325 mg by mouth once daily.    furosemide (LASIX) 20 MG tablet Take 20 mg by mouth once daily.    lactulose (CHRONULAC) 20 gram/30 mL Soln Take 45 mLs (30 g total) by mouth 3 (three) times daily.     levetiracetam 500 mg/5 mL (5 mL) Soln Take 10 mLs (1,000 mg total) by mouth 2 (two) times daily.    lithium (LITHOBID) 300 MG CR tablet Take 1 tablet (300 mg total) by mouth every evening.    OLANZapine (ZYPREXA) 5 MG tablet Take 1 tablet (5 mg total) by mouth every evening.    pantoprazole (PROTONIX) 40 mg suspension Take 1 packet (40 mg total) by mouth 2 (two) times daily.    rifAXIMin (XIFAXAN) 550 mg Tab Take 1 tablet (550 mg total) by mouth 2 (two) times daily.     Family History       Problem Relation (Age of Onset)    Alcohol abuse Father, Sister, Paternal Grandfather    Bipolar disorder Father    Hypertension Mother    Suicide Father          Tobacco Use    Smoking status: Never    Smokeless tobacco: Never   Substance and Sexual Activity    Alcohol use: Not Currently     Comment: hx of ETOH abuse with cirrhosis of liver    Drug use: No    Sexual activity: Not Currently     Review of Systems   Constitutional:  Negative for activity change, appetite change, chills, fever and unexpected weight change.   HENT:  Negative for congestion and sore throat.    Respiratory:  Positive for shortness of breath. Negative for cough.    Cardiovascular:  Negative for chest pain, palpitations and leg swelling.   Gastrointestinal:  Positive for abdominal distention. Negative for abdominal pain, blood in stool, constipation, diarrhea, nausea and vomiting.   Genitourinary:  Negative for difficulty urinating, dysuria and hematuria.   Musculoskeletal:  Positive for arthralgias. Negative for myalgias.   Skin:  Positive for color change. Negative for rash.   Neurological:  Negative for dizziness, tremors and seizures.     Objective:     Vital Signs (Most Recent):  Temp: 98.2 °F (36.8 °C) (09/01/23 0102)  Pulse: 84 (09/01/23 0102)  Resp: 19 (09/01/23 0102)  BP: (!) 143/65 (09/01/23 0102)  SpO2: 97 % (09/01/23 0102) Vital Signs (24h Range):  Temp:  [98.2 °F (36.8 °C)-98.6 °F (37 °C)] 98.2 °F (36.8 °C)  Pulse:  [83-88]  84  Resp:  [14-19] 19  SpO2:  [95 %-97 %] 97 %  BP: (100-143)/(60-65) 143/65        Body mass index is 22.58 kg/m².     Physical Exam  Vitals reviewed.   Constitutional:       General: She is not in acute distress.     Appearance: She is well-developed.   HENT:      Head: Normocephalic and atraumatic.   Eyes:      General: Scleral icterus present.      Extraocular Movements: Extraocular movements intact.      Pupils: Pupils are equal, round, and reactive to light.   Neck:      Vascular: No JVD.      Trachea: No tracheal deviation.   Cardiovascular:      Rate and Rhythm: Normal rate and regular rhythm.      Heart sounds: No murmur heard.     No friction rub. No gallop.   Pulmonary:      Effort: No respiratory distress.      Breath sounds: Normal breath sounds. No wheezing or rales.   Abdominal:      General: Bowel sounds are normal. There is distension.      Palpations: Abdomen is soft. There is no mass.      Tenderness: There is no abdominal tenderness.   Musculoskeletal:         General: No deformity.      Cervical back: Neck supple.      Right lower leg: Edema present.      Left lower leg: Edema present.   Lymphadenopathy:      Cervical: No cervical adenopathy.   Skin:     General: Skin is warm and dry.      Findings: No rash.   Neurological:      Mental Status: She is alert and oriented to person, place, and time.              CRANIAL NERVES     CN III, IV, VI   Pupils are equal, round, and reactive to light.       Significant Labs: All pertinent labs within the past 24 hours have been reviewed.    Significant Imaging: I have reviewed all pertinent imaging results/findings within the past 24 hours.    Assessment/Plan:     * Hepatic encephalopathy  -Pt. With worsening confusion, reported to be constipated, likely not taking adequate dose of lactulose  -Ammonia elevated to 79, will treat with lactulose and ammonia. No signs of infection now, but f/u full sepsis work-up with U/a, blood cultures, CXR given pt. Recent  diagnosis of E.coli bacteremia      Acute cystitis  U/A with >100 WBC, many bacteria. UTI suspected to be source of recent bactermia, will treat with ceftriaxone, f/u urine and blood cultures      Chronic deep vein thrombosis (DVT) of right lower extremity  --bilateral lower extremity venous ultrasound on 08/15/2023= Chronic DVT of the right common femoral and saphenofemoral junction. Bilateral inguinal ascites  -IVC filter in place (patient had IVC filter placed on recent hospitalization )  -patient not a candidate for anticoagulation due to recurrent GI bleeding and severe thrombocytopenia          Alcoholic cirrhosis  MELD 3.0: 21 at 8/31/2023  5:08 PM  MELD-Na: 17 at 8/31/2023  5:08 PM  Calculated from:  Serum Creatinine: 0.5 mg/dL (Using min of 1 mg/dL) at 8/31/2023  5:08 PM  Serum Sodium: 142 mmol/L (Using max of 137 mmol/L) at 8/31/2023  5:08 PM  Total Bilirubin: 3.0 mg/dL at 8/31/2023  5:08 PM  Serum Albumin: 1.7 g/dL at 8/31/2023  5:08 PM  INR(ratio): 1.8 at 8/31/2023  5:08 PM  Age at listing (hypothetical): 58 years  Sex: Female at 8/31/2023  5:08 PM          Hypokalemia  -K+ 2.5, replace PRN along with Mg and f/u repeat levels. Monitor on telemetry      History of seizure  -Continue home keppra      Thrombocytopenia  -Plts 64, 2/2 chronic liver disease, no acute issues. Monitor for signs of bleeding      VTE Risk Mitigation (From admission, onward)         Ordered     IP VTE HIGH RISK PATIENT  Once         08/31/23 2324     Place sequential compression device  Until discontinued         08/31/23 2324                   On 09/01/2023, patient should be placed in hospital observation services under my care.        Remy Rivera MD  Department of Hospital Medicine  [facility] - Telemetry Stepdown

## 2023-09-01 NOTE — HPI
"57 yo F with PMHx alcoholic cirrhosis, recurrent GI bleeds, and chronic LE DVT s/p IVC filter placement who presents to the ED from [facility] for reported "shortness of breath, high ammonia levels and constipation x a few days". Pt. Mildly confused at time of my interview, so chronology of events is limited, but the patietn reports that she has had worsening swelling in her feet and abdomen as well as some SOB. She believes the fluid in her abdomen and legs is causing her to feel weak and SOB. She also has had constipation despite being on lactulose for HE. She denies any fevers, chills, nausea, or vomiting. Of note, pt. Recently admitted to [facility] for E.coli bacteremia thought to be related to a UTI. ID recommended midline placement for ceftriaxone 2gm IV daily for total of 2 wks from 8/14 to 8/28/23. Pt. Reports that she completed this and her midline was removed.     "

## 2023-09-01 NOTE — ASSESSMENT & PLAN NOTE
--bilateral lower extremity venous ultrasound on 08/15/2023= Chronic DVT of the right common femoral and saphenofemoral junction. Bilateral inguinal ascites  -IVC filter in place (patient had IVC filter placed on recent hospitalization )  -patient not a candidate for anticoagulation due to recurrent GI bleeding and severe thrombocytopenia

## 2023-09-01 NOTE — ASSESSMENT & PLAN NOTE
MELD 3.0: 21 at 8/31/2023  5:08 PM  MELD-Na: 17 at 8/31/2023  5:08 PM  Calculated from:  Serum Creatinine: 0.5 mg/dL (Using min of 1 mg/dL) at 8/31/2023  5:08 PM  Serum Sodium: 142 mmol/L (Using max of 137 mmol/L) at 8/31/2023  5:08 PM  Total Bilirubin: 3.0 mg/dL at 8/31/2023  5:08 PM  Serum Albumin: 1.7 g/dL at 8/31/2023  5:08 PM  INR(ratio): 1.8 at 8/31/2023  5:08 PM  Age at listing (hypothetical): 58 years  Sex: Female at 8/31/2023  5:08 PM

## 2023-09-01 NOTE — ED TRIAGE NOTES
Marely Hamilton, a 58 y.o. female presents to the ED w/ complaint of SOB. From [facility] for SOB, hx of high ammonia levels abd is constipated. Edema noted to bilateral extremities, on 2L NS    Adult Physical Assessment  LOC: Marely Hamilton, 58 y.o. female verified via two identifiers.  The patient is awake, alert, oriented and speaking appropriately at this time.  APPEARANCE: Patient resting comfortably and appears to be in no acute distress at this time. Patient is unkempt and thin, patient's clothing is properly fastened.  SKIN:The skin is warm and dry, color consistent with ethnicity/ some jaundice noted, patient has normal skin turgor and moist mucus membranes, skin intact, no breakdown or brusing noted.  MUSCULOSKELETAL: Patient moving all extremities well, no obvious swelling or deformities noted.  RESPIRATORY: Airway is open and patent, respirations are spontaneous, patient has a normal effort and rate, no accessory muscle use noted.  CARDIAC: Patient has a normal rate and rhythm, no periphreal edema noted in any extremity, capillary refill < 3 seconds in all extremities. Patient reports to ED for SOB, 2L NC at baseline  ABDOMEN: Soft and non tender to palpation, no abdominal distention noted. Bowel sounds present in all four quadrants. Patient has a hx of high ammonia levels, hx of constipation  NEUROLOGIC: Eyes open spontaneously, behavior appropriate to situation, follows commands, facial expression symmetrical, bilateral hand grasp equal and even, purposeful motor response noted, normal sensation in all extremities when touched with a finger.      Triage note:  Chief Complaint   Patient presents with    Shortness of Breath     Arrives via EMS from [facility] for SOB, has high ammonia levels, constipated. Pitting edema to BLE. On 2L NC      Review of patient's allergies indicates:   Allergen Reactions    Sulfa (sulfonamide antibiotics) Rash    Codeine Nausea And Vomiting     Past Medical  History:   Diagnosis Date    Addiction to drug     Alcohol abuse     Alcohol abuse, in remission 6/15/2015    14.5 weeks ago; AA weekly    Anemia     Anxiety 6/15/2015    Behavioral problem     Bipolar disorder     Bipolar disorder in remission 6/15/2015    Cirrhosis, Laennec's 6/15/2015    Depression     Encounter for blood transfusion     Epistaxis 6/15/2015    Fatigue     Glaucoma     Hematuria     Hepatic encephalopathy 6/15/2015    Hepatic enlargement     History of psychiatric care     History of psychiatric hospitalization     History of seizure 6/15/2015    1    hx of intentional Tylenol overdose 6/15/2015    2005- situational and hx of bipolar    Hx of psychiatric care     Macrocytic anemia 9/18/2015    6 units PRBC since June 2015    Jeana     Osteoarthritis     Other ascites 6/15/2015    Psychiatric exam requested by authority     Psychiatric problem     Psychosis 9/26/2019    Renal disorder     Seizures     Self-harming behavior     Suicide attempt     Therapy     Thrombocytopenia 6/15/2015

## 2023-09-01 NOTE — CARE UPDATE
Continue lactulose with goal BM >3/day. UA infectious, urine culture pending. Patient denies UTI symptoms. K 3.0 with am labs, repleting. No other complaints at this time. Continue current POC.

## 2023-09-01 NOTE — NURSING
Patient arrived to unit at 0030, Awake and alert, able to make needs known to staff. Answers questions appropriately, but periods of confusion noted r/t Bipolar Dx. AROM to FRANK extremities,PROM to BLE edema +3 pitting, floated on pillows. ABD distended, bowel sounds active, Saldaña placed by ED r/t skin integrity, photos captured and wound care consulted. No apparent distress noted, will continue to follow current plan of care.

## 2023-09-01 NOTE — PLAN OF CARE
[facility] - Telemetry Stepdown  Initial Discharge Assessment       Primary Care Provider: Viktor Ross MD    Admission Diagnosis: Hepatic encephalopathy [K76.82]  Shortness of breath [R06.02]  Abdominal distention [R14.0]  Hyperammonemia [E72.20]  Laennec's cirrhosis [K70.30]    Admission Date: 8/31/2023  Expected Discharge Date: 9/5/2023    Transition of Care Barriers: None    Payor: [employer] MEDICARE / Plan: [employer] MEDICARE HMO / Product Type: Capitation /     Extended Emergency Contact Information  Primary Emergency Contact: Zaria Hamilton DC [facility]  Home Phone: [phone]  Relation: Sister  Secondary Emergency Contact: Bridget Hamilton           [place], [place]  Home Phone: [phone]  Relation: Sister    Discharge Plan A: Skilled Nursing Facility  Discharge Plan B: New Nursing Home placement - care home care facility      [facility] [place] [facility] [address]  [address]  [place] [address]  Phone: [phone] Fax: [phone]      Initial Assessment (most recent)       Adult Discharge Assessment - 09/01/23 1327          Discharge Assessment    Assessment Type Discharge Planning Assessment     Source of Information patient;health record     Reason For Admission Hepatic encephalopathy     People in Home alone     Facility Arrived From: [facility]     Prior to hospitilization cognitive status: --   Confused at times    Current cognitive status: --   Confused at times    Walking or Climbing Stairs ambulation difficulty, requires equipment     Equipment Currently Used at Home wheelchair     Readmission within 30 days? Yes   Recently discharged from [facility] on 8/18    Patient currently being followed by outpatient case management? No     Do you currently have service(s) that help you manage your care at home? No     Do you take prescription medications? Yes     Do you have prescription coverage? Yes     Do you have any  problems affording any of your prescribed medications? No     Is the patient taking medications as prescribed? yes     Are you on dialysis? No     Do you take coumadin? No     DME Needed Upon Discharge  other (see comments)   TBD    Discharge Plan discussed with: Patient     Transition of Care Barriers None     Discharge Plan A Skilled Nursing Facility     Discharge Plan B New Nursing Home placement - intermediate care facility                     Call placed to patient's sister Zaria ([phone]) to discuss her sister's discharge plan. Zaria informed this CM that Ms. Hamilton is currently staying at [facility]. She would like her sister to return to the facility when she is medically stable.    Jyoti Jones RN  Ext [phone]

## 2023-09-01 NOTE — SUBJECTIVE & OBJECTIVE
Past Medical History:   Diagnosis Date    Addiction to drug     Alcohol abuse     Alcohol abuse, in remission 6/15/2015    14.5 weeks ago; AA weekly    Anemia     Anxiety 6/15/2015    Behavioral problem     Bipolar disorder     Bipolar disorder in remission 6/15/2015    Cirrhosis, Laennec's 6/15/2015    Depression     Encounter for blood transfusion     Epistaxis 6/15/2015    Fatigue     Glaucoma     Hematuria     Hepatic encephalopathy 6/15/2015    Hepatic enlargement     History of psychiatric care     History of psychiatric hospitalization     History of seizure 6/15/2015    1    hx of intentional Tylenol overdose 6/15/2015    2005- situational and hx of bipolar    Hx of psychiatric care     Macrocytic anemia 9/18/2015    6 units PRBC since June 2015    Jeana     Osteoarthritis     Other ascites 6/15/2015    Psychiatric exam requested by authority     Psychiatric problem     Psychosis 9/26/2019    Renal disorder     Seizures     Self-harming behavior     Suicide attempt     Therapy     Thrombocytopenia 6/15/2015       Past Surgical History:   Procedure Laterality Date    COSMETIC SURGERY      EMBOLIZATION N/A 6/11/2023    Procedure: EMBOLIZATION, BLOOD VESSEL;  Surgeon: Debbie Surgeon;  Location: [facility];  Service: Anesthesiology;  Laterality: N/A;    ESOPHAGOGASTRODUODENOSCOPY      ESOPHAGOGASTRODUODENOSCOPY N/A 7/6/2023    Procedure: EGD (ESOPHAGOGASTRODUODENOSCOPY);  Surgeon: Bernice Guillen MD;  Location: [address]);  Service: Endoscopy;  Laterality: N/A;    ESOPHAGOGASTRODUODENOSCOPY N/A 7/11/2023    Procedure: EGD (ESOPHAGOGASTRODUODENOSCOPY);  Surgeon: Rangel Broussard MD;  Location: [address]);  Service: Endoscopy;  Laterality: N/A;       Review of patient's allergies indicates:   Allergen Reactions    Sulfa (sulfonamide antibiotics) Rash    Codeine Nausea And Vomiting       No current facility-administered medications on file prior to encounter.     Current Outpatient Medications on  File Prior to Encounter   Medication Sig    ALPRAZolam (XANAX) 0.25 MG tablet Take 0.25 mg by mouth 2 (two) times a day.    ferrous sulfate (FEOSOL) 325 mg (65 mg iron) Tab tablet Take 325 mg by mouth once daily.    furosemide (LASIX) 20 MG tablet Take 20 mg by mouth once daily.    lactulose (CHRONULAC) 20 gram/30 mL Soln Take 45 mLs (30 g total) by mouth 3 (three) times daily.    levetiracetam 500 mg/5 mL (5 mL) Soln Take 10 mLs (1,000 mg total) by mouth 2 (two) times daily.    lithium (LITHOBID) 300 MG CR tablet Take 1 tablet (300 mg total) by mouth every evening.    OLANZapine (ZYPREXA) 5 MG tablet Take 1 tablet (5 mg total) by mouth every evening.    pantoprazole (PROTONIX) 40 mg suspension Take 1 packet (40 mg total) by mouth 2 (two) times daily.    rifAXIMin (XIFAXAN) 550 mg Tab Take 1 tablet (550 mg total) by mouth 2 (two) times daily.     Family History       Problem Relation (Age of Onset)    Alcohol abuse Father, Sister, Paternal Grandfather    Bipolar disorder Father    Hypertension Mother    Suicide Father          Tobacco Use    Smoking status: Never    Smokeless tobacco: Never   Substance and Sexual Activity    Alcohol use: Not Currently     Comment: hx of ETOH abuse with cirrhosis of liver    Drug use: No    Sexual activity: Not Currently     Review of Systems   Constitutional:  Negative for activity change, appetite change, chills, fever and unexpected weight change.   HENT:  Negative for congestion and sore throat.    Respiratory:  Positive for shortness of breath. Negative for cough.    Cardiovascular:  Negative for chest pain, palpitations and leg swelling.   Gastrointestinal:  Positive for abdominal distention. Negative for abdominal pain, blood in stool, constipation, diarrhea, nausea and vomiting.   Genitourinary:  Negative for difficulty urinating, dysuria and hematuria.   Musculoskeletal:  Positive for arthralgias. Negative for myalgias.   Skin:  Positive for color change. Negative for rash.    Neurological:  Negative for dizziness, tremors and seizures.     Objective:     Vital Signs (Most Recent):  Temp: 98.2 °F (36.8 °C) (09/01/23 0102)  Pulse: 84 (09/01/23 0102)  Resp: 19 (09/01/23 0102)  BP: (!) 143/65 (09/01/23 0102)  SpO2: 97 % (09/01/23 0102) Vital Signs (24h Range):  Temp:  [98.2 °F (36.8 °C)-98.6 °F (37 °C)] 98.2 °F (36.8 °C)  Pulse:  [83-88] 84  Resp:  [14-19] 19  SpO2:  [95 %-97 %] 97 %  BP: (100-143)/(60-65) 143/65        Body mass index is 22.58 kg/m².     Physical Exam  Vitals reviewed.   Constitutional:       General: She is not in acute distress.     Appearance: She is well-developed.   HENT:      Head: Normocephalic and atraumatic.   Eyes:      General: Scleral icterus present.      Extraocular Movements: Extraocular movements intact.      Pupils: Pupils are equal, round, and reactive to light.   Neck:      Vascular: No JVD.      Trachea: No tracheal deviation.   Cardiovascular:      Rate and Rhythm: Normal rate and regular rhythm.      Heart sounds: No murmur heard.     No friction rub. No gallop.   Pulmonary:      Effort: No respiratory distress.      Breath sounds: Normal breath sounds. No wheezing or rales.   Abdominal:      General: Bowel sounds are normal. There is distension.      Palpations: Abdomen is soft. There is no mass.      Tenderness: There is no abdominal tenderness.   Musculoskeletal:         General: No deformity.      Cervical back: Neck supple.      Right lower leg: Edema present.      Left lower leg: Edema present.   Lymphadenopathy:      Cervical: No cervical adenopathy.   Skin:     General: Skin is warm and dry.      Findings: No rash.   Neurological:      Mental Status: She is alert and oriented to person, place, and time.              CRANIAL NERVES     CN III, IV, VI   Pupils are equal, round, and reactive to light.       Significant Labs: All pertinent labs within the past 24 hours have been reviewed.    Significant Imaging: I have reviewed all pertinent  imaging results/findings within the past 24 hours.

## 2023-09-01 NOTE — PHARMACY MED REC
"Admission Medication History     The home medication history was taken by Lucia Anguiano.    You may go to "Admission" then "Reconcile Home Medications" tabs to review and/or act upon these items.     The home medication list has been updated by the Pharmacy department.   Please read ALL comments highlighted in yellow.   Please address this information as you see fit.    Feel free to contact us if you have any questions or require assistance.        Current Outpatient Medications on File Prior to Encounter   Medication Sig    ALPRAZolam (XANAX) 0.25 MG tablet   Take 0.25 mg by mouth 2 (two) times a day.    ferrous sulfate (FEOSOL) 325 mg (65 mg iron) Tab tablet   Take 325 mg by mouth once daily.    furosemide (LASIX) 20 MG tablet   Take 20 mg by mouth once daily.    lactulose (CHRONULAC) 20 gram/30 mL Soln   Take 45 mLs (30 g total) by mouth 3 (three) times daily.    levetiracetam 500 mg/5 mL (5 mL) Soln   Take 10 mLs (1,000 mg total) by mouth 2 (two) times daily.    lithium (LITHOBID) 300 MG CR tablet   Take 1 tablet (300 mg total) by mouth every evening.    OLANZapine (ZYPREXA) 5 MG tablet   Take 1 tablet (5 mg total) by mouth every evening.    pantoprazole (PROTONIX) 40 mg suspension   Take 1 packet (40 mg total) by mouth 2 (two) times daily.    rifAXIMin (XIFAXAN) 550 mg Tab   Take 1 tablet (550 mg total) by mouth 2 (two) times daily.       Lucia Anguiano  EXT [id number]                  .          "
[Negative] : Genitourinary

## 2023-09-01 NOTE — NURSING
Nurses Note -- 4 Eyes      9/1/2023   6:37 AM      Skin assessed during: Admit      [] No Altered Skin Integrity Present    []Prevention Measures Documented      [x] Yes- Altered Skin Integrity Present or Discovered   [] LDA Added if Not in Epic (Describe Wound)   [x] New Altered Skin Integrity was Present on Admit and Documented in LDA   [] Wound Image Taken    Wound Care Consulted? Yes    Attending Nurse:  Brijesh Mace RN    Second RN/Staff Member:  Lynda Aden RN

## 2023-09-01 NOTE — ASSESSMENT & PLAN NOTE
U/A with >100 WBC, many bacteria. UTI suspected to be source of recent bactermia, will treat with ceftriaxone, f/u urine and blood cultures

## 2023-09-01 NOTE — ASSESSMENT & PLAN NOTE
-Pt. With worsening confusion, reported to be constipated, likely not taking adequate dose of lactulose  -Ammonia elevated to 79, will treat with lactulose and ammonia. No signs of infection now, but f/u full sepsis work-up with U/a, blood cultures, CXR given pt. Recent diagnosis of E.coli bacteremia

## 2023-09-02 LAB
ALBUMIN SERPL BCP-MCNC: 1.9 G/DL (ref 3.5–5.2)
ALP SERPL-CCNC: 170 U/L (ref 55–135)
ALT SERPL W/O P-5'-P-CCNC: 17 U/L (ref 10–44)
ANION GAP SERPL CALC-SCNC: 9 MMOL/L (ref 8–16)
AST SERPL-CCNC: 40 U/L (ref 10–40)
BACTERIA UR CULT: NORMAL
BACTERIA UR CULT: NORMAL
BASOPHILS # BLD AUTO: 0.02 K/UL (ref 0–0.2)
BASOPHILS NFR BLD: 0.6 % (ref 0–1.9)
BILIRUB SERPL-MCNC: 3.5 MG/DL (ref 0.1–1)
BUN SERPL-MCNC: 6 MG/DL (ref 6–20)
CALCIUM SERPL-MCNC: 8 MG/DL (ref 8.7–10.5)
CHLORIDE SERPL-SCNC: 111 MMOL/L (ref 95–110)
CO2 SERPL-SCNC: 17 MMOL/L (ref 23–29)
CREAT SERPL-MCNC: 0.5 MG/DL (ref 0.5–1.4)
DIFFERENTIAL METHOD: ABNORMAL
EOSINOPHIL # BLD AUTO: 0.1 K/UL (ref 0–0.5)
EOSINOPHIL NFR BLD: 2.2 % (ref 0–8)
ERYTHROCYTE [DISTWIDTH] IN BLOOD BY AUTOMATED COUNT: 17.1 % (ref 11.5–14.5)
EST. GFR  (NO RACE VARIABLE): >60 ML/MIN/1.73 M^2
GLUCOSE SERPL-MCNC: 131 MG/DL (ref 70–110)
HCT VFR BLD AUTO: 25.1 % (ref 37–48.5)
HGB BLD-MCNC: 8.1 G/DL (ref 12–16)
IMM GRANULOCYTES # BLD AUTO: 0.03 K/UL (ref 0–0.04)
IMM GRANULOCYTES NFR BLD AUTO: 0.8 % (ref 0–0.5)
LYMPHOCYTES # BLD AUTO: 0.7 K/UL (ref 1–4.8)
LYMPHOCYTES NFR BLD: 18.5 % (ref 18–48)
MCH RBC QN AUTO: 34.8 PG (ref 27–31)
MCHC RBC AUTO-ENTMCNC: 32.3 G/DL (ref 32–36)
MCV RBC AUTO: 108 FL (ref 82–98)
MONOCYTES # BLD AUTO: 0.5 K/UL (ref 0.3–1)
MONOCYTES NFR BLD: 13.3 % (ref 4–15)
NEUTROPHILS # BLD AUTO: 2.3 K/UL (ref 1.8–7.7)
NEUTROPHILS NFR BLD: 64.6 % (ref 38–73)
NRBC BLD-RTO: 0 /100 WBC
PLATELET # BLD AUTO: 64 K/UL (ref 150–450)
PMV BLD AUTO: 10.5 FL (ref 9.2–12.9)
POTASSIUM SERPL-SCNC: 3.6 MMOL/L (ref 3.5–5.1)
PROT SERPL-MCNC: 5.5 G/DL (ref 6–8.4)
RBC # BLD AUTO: 2.33 M/UL (ref 4–5.4)
SODIUM SERPL-SCNC: 137 MMOL/L (ref 136–145)
WBC # BLD AUTO: 3.62 K/UL (ref 3.9–12.7)

## 2023-09-02 PROCEDURE — 80053 COMPREHEN METABOLIC PANEL: CPT | Performed by: HOSPITALIST

## 2023-09-02 PROCEDURE — 36415 COLL VENOUS BLD VENIPUNCTURE: CPT | Performed by: HOSPITALIST

## 2023-09-02 PROCEDURE — 63600175 PHARM REV CODE 636 W HCPCS: Performed by: HOSPITALIST

## 2023-09-02 PROCEDURE — 25000003 PHARM REV CODE 250: Performed by: HOSPITALIST

## 2023-09-02 PROCEDURE — 97530 THERAPEUTIC ACTIVITIES: CPT

## 2023-09-02 PROCEDURE — 51702 INSERT TEMP BLADDER CATH: CPT

## 2023-09-02 PROCEDURE — 20600001 HC STEP DOWN PRIVATE ROOM

## 2023-09-02 PROCEDURE — 99232 PR SUBSEQUENT HOSPITAL CARE,LEVL II: ICD-10-PCS | Mod: ,,, | Performed by: STUDENT IN AN ORGANIZED HEALTH CARE EDUCATION/TRAINING PROGRAM

## 2023-09-02 PROCEDURE — 97165 OT EVAL LOW COMPLEX 30 MIN: CPT

## 2023-09-02 PROCEDURE — 99232 SBSQ HOSP IP/OBS MODERATE 35: CPT | Mod: ,,, | Performed by: STUDENT IN AN ORGANIZED HEALTH CARE EDUCATION/TRAINING PROGRAM

## 2023-09-02 PROCEDURE — 85025 COMPLETE CBC W/AUTO DIFF WBC: CPT | Performed by: HOSPITALIST

## 2023-09-02 RX ADMIN — LITHIUM CARBONATE 300 MG: 300 TABLET, FILM COATED, EXTENDED RELEASE ORAL at 09:09

## 2023-09-02 RX ADMIN — LACTULOSE 30 G: 20 SOLUTION ORAL at 03:09

## 2023-09-02 RX ADMIN — CEFTRIAXONE 2 G: 2 INJECTION, POWDER, FOR SOLUTION INTRAMUSCULAR; INTRAVENOUS at 03:09

## 2023-09-02 RX ADMIN — LEVETIRACETAM 1000 MG: 100 SOLUTION ORAL at 09:09

## 2023-09-02 RX ADMIN — PANTOPRAZOLE SODIUM 40 MG: 40 GRANULE, DELAYED RELEASE ORAL at 09:09

## 2023-09-02 RX ADMIN — PANTOPRAZOLE SODIUM 40 MG: 40 GRANULE, DELAYED RELEASE ORAL at 08:09

## 2023-09-02 RX ADMIN — RIFAXIMIN 550 MG: 550 TABLET ORAL at 09:09

## 2023-09-02 RX ADMIN — LEVETIRACETAM 1000 MG: 100 SOLUTION ORAL at 08:09

## 2023-09-02 RX ADMIN — LACTULOSE 30 G: 20 SOLUTION ORAL at 08:09

## 2023-09-02 RX ADMIN — RIFAXIMIN 550 MG: 550 TABLET ORAL at 08:09

## 2023-09-02 RX ADMIN — FUROSEMIDE 20 MG: 20 TABLET ORAL at 08:09

## 2023-09-02 NOTE — ASSESSMENT & PLAN NOTE
Nutrition Problem:  Severe Protein-Calorie Malnutrition  Malnutrition in the context of Social/Environmental Circumstances    Related to (etiology):  Inability to consume sufficient energy    Signs and Symptoms (as evidenced by):  Body Fat Depletion: moderate and severe depletion of orbitals and triceps   Muscle Mass Depletion: moderate and severe depletion of temples, clavicle region and scapular region   Weight Loss: 8% x 2-3 months    Interventions(treatment strategy):  Collaboration of nutrition care w/ other providers  [facility]    Nutrition Diagnosis Status:  New/Continues

## 2023-09-02 NOTE — PT/OT/SLP EVAL
Occupational Therapy   Evaluation    Name: Marely Hamilton  MRN: [id number]  Admitting Diagnosis: Hepatic encephalopathy  Recent Surgery: * No surgery found *      Recommendations:     Discharge Recommendations:  (return to NH)  Discharge Equipment Recommendations:  to be determined by next level of care  Barriers to discharge:  Decreased caregiver support    Assessment:     Marely Hamilton is a 58 y.o. female with a medical diagnosis of Hepatic encephalopathy.  She presents with decreased independence with ADL's. Performance deficits affecting function: weakness, impaired endurance, impaired self care skills, impaired functional mobility, impaired balance, decreased coordination, decreased upper extremity function, decreased lower extremity function, decreased safety awareness, edema.      Rehab Prognosis: Fair; patient would benefit from acute skilled OT services to address these deficits and reach maximum level of function.       Plan:     Patient to be seen 3 x/week to address the above listed problems via self-care/home management, therapeutic activities, therapeutic exercises, neuromuscular re-education, cognitive retraining  Plan of Care Expires: 10/02/23  Plan of Care Reviewed with: patient    Subjective     Chief Complaint: fear of falling  Patient/Family Comments/goals: pt reported that she is very scared of falling.    Occupational Profile:  Living Environment: Pt resides currently at [facility].  Previously pt resided with her mother (recently passed away) in a 1 story house with no steps to enter.  Pt reports being Modified independent with ADL's & ambulation with RW at the NH.  Pt is normally an active  & is disabled.  Pt enjoys drawing. Pt has a walk-in shower at the NH to use.  Equipment Used at Home: walker, rolling, shower chair  Assistance upon Discharge: NH staff    Pain/Comfort:  Pain Rating 1: 0/10  Pain Rating Post-Intervention 1: 0/10    Patients cultural, spiritual, Uatsdin  conflicts given the current situation: no    Objective:     Communicated with: RN prior to session.  Patient found supine with baugh catheter, telemetry (no family present) upon OT entry to room.    General Precautions: Standard, fall  Orthopedic Precautions: N/A  Braces: N/A  Respiratory Status: Room air    Occupational Performance:    Bed Mobility:    Patient completed Rolling/Turning to Left with  moderate assistance  Patient completed Rolling/Turning to Right with moderate assistance  Patient completed Scooting/Bridging with maximal assistance forward on EOB; dependent drawsheet transfers up HOB  Patient completed Supine to Sit with maximal assistance  Patient completed Sit to Supine with moderate assistance    Functional Mobility/Transfers:  Pt declined performing despite education on techniques & safety during mobility.    Activities of Daily Living:  Feeding:  set up (A) while seated in bed with HOB elevated    Upper Body Dressing: minimum assistance donning gown around back while seated EOB  Lower Body Dressing: total assistance donning socks seated EOB    Cognitive/Visual Perceptual:  Cognitive/Psychosocial Skills:     -       Oriented to: Person, Place, Time, and Situation   -       Follows Commands/attention:Follows one-step commands  -       Safety awareness/insight to disability: impaired     Physical Exam:  Edema:  Moderate BLE  Sensation: ? Impaired per pt report in BUE to light touch in various areas (other areas of BUE intact)  Dominant hand:    -       right  Upper Extremity Range of Motion:  BUE WFL except 90* shoulder flexion  Upper Extremity Strength: BUE 3+/5 except 3-/5 shoulder flexion   Strength: BUE fair    AMPAC 6 Click ADL:  AMPAC Total Score: 13    Treatment & Education:  Provided education on safety.  Provided extensive education regarding technique & safety of techniques for transfers with OT however pt declined standing despite encouragement.  Pt required SBA-CGA for postural  control while seated EOB.  Provided verbal & physical cues to facilitate postural control. Provided education regarding role of OT, POC, & discharge recommendations with pt verbalizing understanding.  Pt had no further questions & when asked whether there were any concerns pt reported none.        Patient left supine with all lines intact, call button in reach, bed alarm on, and RN notified    GOALS:   Multidisciplinary Problems       Occupational Therapy Goals          Problem: Occupational Therapy    Goal Priority Disciplines Outcome Interventions   Occupational Therapy Goal     OT, PT/OT Ongoing, Progressing    Description: Goals to be met by: 9/25     Patient will increase functional independence with ADLs by performing:    UE Dressing with Stand-by Assistance.  LE Dressing with Maximum Assistance.  Grooming while seated with Set-up Assistance.  Toileting from bedside commode with Moderate Assistance for hygiene and clothing management.   Rolling to Bilateral with Stand-by Assistance.   Supine to sit with Minimal Assistance.  Stand pivot transfers with Moderate Assistance.                         History:     Past Medical History:   Diagnosis Date    Addiction to drug     Alcohol abuse     Alcohol abuse, in remission 6/15/2015    14.5 weeks ago; AA weekly    Anemia     Anxiety 6/15/2015    Behavioral problem     Bipolar disorder     Bipolar disorder in remission 6/15/2015    Cirrhosis, Laennec's 6/15/2015    Depression     Encounter for blood transfusion     Epistaxis 6/15/2015    Fatigue     Glaucoma     Hematuria     Hepatic encephalopathy 6/15/2015    Hepatic enlargement     History of psychiatric care     History of psychiatric hospitalization     History of seizure 6/15/2015    1    hx of intentional Tylenol overdose 6/15/2015    2005- situational and hx of bipolar    Hx of psychiatric care     Macrocytic anemia 9/18/2015    6 units PRBC since June 2015    Jeana     Osteoarthritis     Other ascites  6/15/2015    Psychiatric exam requested by authority     Psychiatric problem     Psychosis 9/26/2019    Renal disorder     Seizures     Self-harming behavior     Suicide attempt     Therapy     Thrombocytopenia 6/15/2015         Past Surgical History:   Procedure Laterality Date    COSMETIC SURGERY      EMBOLIZATION N/A 6/11/2023    Procedure: EMBOLIZATION, BLOOD VESSEL;  Surgeon: Debbie Surgeon;  Location: [facility];  Service: Anesthesiology;  Laterality: N/A;    ESOPHAGOGASTRODUODENOSCOPY      ESOPHAGOGASTRODUODENOSCOPY N/A 7/6/2023    Procedure: EGD (ESOPHAGOGASTRODUODENOSCOPY);  Surgeon: Bernice Guillen MD;  Location: [address]);  Service: Endoscopy;  Laterality: N/A;    ESOPHAGOGASTRODUODENOSCOPY N/A 7/11/2023    Procedure: EGD (ESOPHAGOGASTRODUODENOSCOPY);  Surgeon: Rangel Broussard MD;  Location: [address]);  Service: Endoscopy;  Laterality: N/A;       Time Tracking:     OT Date of Treatment: 09/02/23  OT Start Time: 1158  OT Stop Time: 1227  OT Total Time (min): 29 min    Billable Minutes:Evaluation 15  Therapeutic Activity 14    9/2/2023

## 2023-09-02 NOTE — CONSULTS
[facility] - Telemetry Stepdown  Adult Nutrition  Consult Note    SUMMARY     Recommendations    1. Liberalize diet to Low Na & add Boost Plus ONS TID.  2. RD to monitor & follow-up.    Goals: Meet % EEN, EPN by RD f/u date  Nutrition Goal Status: new  Communication of RD Recs: other (comment) (POC)    Assessment and Plan    Severe protein-calorie malnutrition    Nutrition Problem:  Severe Protein-Calorie Malnutrition  Malnutrition in the context of Social/Environmental Circumstances    Related to (etiology):  Inability to consume sufficient energy    Signs and Symptoms (as evidenced by):  Body Fat Depletion: moderate and severe depletion of orbitals and triceps   Muscle Mass Depletion: moderate and severe depletion of temples, clavicle region and scapular region   Weight Loss: 8% x 2-3 months    Interventions(treatment strategy):  Collaboration of nutrition care w/ other providers  ONS    Nutrition Diagnosis Status:  New/Continues    Malnutrition Assessment    Malnutrition Context: social/environmental circumstances  Malnutrition Level: severe    Weight Loss (Malnutrition): greater than 7.5% in 3 months  Subcutaneous Fat (Malnutrition): severe depletion  Muscle Mass (Malnutrition): severe depletion     Reason for Assessment    Reason For Assessment: consult  Diagnosis: other (see comments) (Encephalopathy)  Relevant Medical History: Alcoholic cirrhosis  Interdisciplinary Rounds: did not attend    General Information Comments: Presents from NH; resting at time of visit this AM. Information obtained from RD assessments in July/August. Pt diagnosed w/ severe malnutrition 2/2 physical exam & weight loss (weight ranges from 103-135# in 2023). RD feels this diagnosis continues - please see PES statement for details. Visual NFPE complete today.  Nutrition Discharge Planning: Adequate PO intake    Nutrition/Diet History    Factors Affecting Nutritional Intake: None identified at this time    Anthropometrics    Temp:  "98.2 °F (36.8 °C)  Height Method: Stated  Height: 5' 2.5" (158.8 cm)  Height (inches): 62.5 in  Weight Method: Bed Scale  Weight: 56.6 kg (124 lb 12.5 oz)  Weight (lb): 124.78 lb  Ideal Body Weight (IBW), Female: 112.5 lb  % Ideal Body Weight, Female (lb): 110.92 %  BMI (Calculated): 22.4  BMI Grade: 18.5-24.9 - normal  Usual Body Weight (UBW), k.4 kg  % Usual Body Weight: 92.38  % Weight Change From Usual Weight: -7.82 %    Lab/Procedures/Meds    Pertinent Labs Reviewed: reviewed  Pertinent Medications Reviewed: reviewed  Pertinent Medications Comments: Lactulose    Estimated/Assessed Needs    Weight Used For Calorie Calculations: 56.6 kg (124 lb 12.5 oz)    Energy Calorie Requirements (kcal):  kcal/d  Energy Need Method: Kcal/kg (35 kcal/kg)    Protein Requirements: 74 g/d (1.3 g/kg)  Weight Used For Protein Calculations: 56.6 kg (124 lb 12.5 oz)    Estimated Fluid Requirement Method: other (see comments) (Per MD or 1 mL/kcal)  RDA Method (mL):     Nutrition Prescription Ordered    Current Diet Order: Cardiac    Evaluation of Received Nutrient/Fluid Intake    I/O: -1L since admit    Comments: LBM: 9/    Tolerance: tolerating    Nutrition Risk    Level of Risk/Frequency of Follow-up:  (1x/week)     Monitor and Evaluation    Food and Nutrient Intake: food and beverage intake, energy intake  Food and Nutrient Adminstration: diet order  Physical Activity and Function: nutrition-related ADLs and IADLs  Anthropometric Measurements: weight, weight change  Biochemical Data, Medical Tests and Procedures: glucose/endocrine profile, inflammatory profile, lipid profile, gastrointestinal profile, electrolyte and renal panel     Nutrition Follow-Up    RD Follow-up?: Yes    "

## 2023-09-02 NOTE — PLAN OF CARE
Problem: Occupational Therapy  Goal: Occupational Therapy Goal  Description: Goals to be met by: 9/25     Patient will increase functional independence with ADLs by performing:    UE Dressing with Stand-by Assistance.  LE Dressing with Maximum Assistance.  Grooming while seated with Set-up Assistance.  Toileting from bedside commode with Moderate Assistance for hygiene and clothing management.   Rolling to Bilateral with Stand-by Assistance.   Supine to sit with Minimal Assistance.  Stand pivot transfers with Moderate Assistance.    Outcome: Ongoing, Progressing     OT eval completed.

## 2023-09-02 NOTE — PLAN OF CARE
Recommendations     1. Liberalize diet to Low Na & add Boost Plus ONS TID.  2. RD to monitor & follow-up.     Goals: Meet % EEN, EPN by RD f/u date  Nutrition Goal Status: new  Communication of RD Recs: other (comment) (POC)

## 2023-09-02 NOTE — ASSESSMENT & PLAN NOTE
-Pt. With worsening confusion, reported to be constipated, likely not taking adequate dose of lactulose  -Ammonia elevated to 79 on presentation   - Continue lactulose and ammonia.   -Infectious work up neg so far

## 2023-09-02 NOTE — PLAN OF CARE
Patient Awake and Alert, able yo make needs known to staff. ABD distended, brijesh excoriation noted, barrier cream applied, large BM over night x2. Saldaña cath in place. No O2 at this time. No apparent distress noted, will continue to follow current plan of care.       Problem: Adult Inpatient Plan of Care  Goal: Plan of Care Review  Outcome: Ongoing, Progressing  Goal: Patient-Specific Goal (Individualized)  Outcome: Ongoing, Progressing  Goal: Absence of Hospital-Acquired Illness or Injury  Outcome: Ongoing, Progressing  Goal: Optimal Comfort and Wellbeing  Outcome: Ongoing, Progressing     Problem: Infection  Goal: Absence of Infection Signs and Symptoms  Outcome: Ongoing, Progressing     Problem: Bleeding (Sepsis/Septic Shock)  Goal: Absence of Bleeding  Outcome: Ongoing, Progressing     Problem: Fluid Imbalance (Pneumonia)  Goal: Fluid Balance  Outcome: Ongoing, Progressing     Problem: Impaired Wound Healing  Goal: Optimal Wound Healing  Outcome: Ongoing, Progressing     Problem: Skin Injury Risk Increased  Goal: Skin Health and Integrity  Outcome: Ongoing, Progressing

## 2023-09-02 NOTE — SUBJECTIVE & OBJECTIVE
Interval History: Patient awake, oriented x 3. Per RN, patient had 2 BM since am. Continue lactulose and rifaximin. Awaiting return to [facility]     Review of Systems  Objective:     Vital Signs (Most Recent):  Temp: 98.2 °F (36.8 °C) (09/02/23 0858)  Pulse: 90 (09/02/23 0858)  Resp: 16 (09/02/23 0858)  BP: (!) 144/65 (09/02/23 0858)  SpO2: (!) 93 % (09/02/23 0858) Vital Signs (24h Range):  Temp:  [98.2 °F (36.8 °C)-99.7 °F (37.6 °C)] 98.2 °F (36.8 °C)  Pulse:  [] 90  Resp:  [16-20] 16  SpO2:  [91 %-97 %] 93 %  BP: (136-163)/(65-77) 144/65     Weight: 56.6 kg (124 lb 12.5 oz)  Body mass index is 22.46 kg/m².    Intake/Output Summary (Last 24 hours) at 9/2/2023 1128  Last data filed at 9/2/2023 0633  Gross per 24 hour   Intake 397.38 ml   Output 1700 ml   Net -1302.62 ml         Physical Exam  Vitals reviewed.   Constitutional:       General: She is not in acute distress.     Appearance: She is well-developed.   HENT:      Head: Normocephalic and atraumatic.   Eyes:      General: Scleral icterus present.      Extraocular Movements: Extraocular movements intact.      Pupils: Pupils are equal, round, and reactive to light.   Neck:      Vascular: No JVD.      Trachea: No tracheal deviation.   Cardiovascular:      Rate and Rhythm: Normal rate and regular rhythm.      Heart sounds: No murmur heard.     No friction rub. No gallop.   Pulmonary:      Effort: No respiratory distress.      Breath sounds: Normal breath sounds. No wheezing or rales.   Abdominal:      General: Bowel sounds are normal. There is distension.      Palpations: Abdomen is soft. There is no mass.      Tenderness: There is no abdominal tenderness.   Musculoskeletal:         General: No deformity.      Cervical back: Neck supple.      Right lower leg: Edema present.      Left lower leg: Edema present.   Lymphadenopathy:      Cervical: No cervical adenopathy.   Skin:     General: Skin is warm and dry.      Findings: No rash.   Neurological:      Mental  Status: She is alert and oriented to person, place, and time.

## 2023-09-02 NOTE — PROGRESS NOTES
"[facility] - Telemetry [facility] Medicine  Progress Note    Patient Name: Marely Hamilton  MRN: [id number]  Patient Class: IP- Inpatient   Admission Date: 8/31/2023  Length of Stay: 1 days  Attending Physician: Pancho Harris MD  Primary Care Provider: Viktor Ross MD        Subjective:     Principal Problem:Hepatic encephalopathy        HPI:  57 yo F with PMHx alcoholic cirrhosis, recurrent GI bleeds, and chronic LE DVT s/p IVC filter placement who presents to the ED from [facility] for reported "shortness of breath, high ammonia levels and constipation x a few days". Pt. Mildly confused at time of my interview, so chronology of events is limited, but the patietn reports that she has had worsening swelling in her feet and abdomen as well as some SOB. She believes the fluid in her abdomen and legs is causing her to feel weak and SOB. She also has had constipation despite being on lactulose for HE. She denies any fevers, chills, nausea, or vomiting. Of note, pt. Recently admitted to [facility] for E.coli bacteremia thought to be related to a UTI. ID recommended midline placement for ceftriaxone 2gm IV daily for total of 2 wks from 8/14 to 8/28/23. Pt. Reports that she completed this and her midline was removed.         Overview/Hospital Course:  No notes on file    Interval History: Patient awake, oriented x 3. Per RN, patient had 2 BM since am. Continue lactulose and rifaximin. Awaiting return to [facility]     Review of Systems  Objective:     Vital Signs (Most Recent):  Temp: 98.2 °F (36.8 °C) (09/02/23 0858)  Pulse: 90 (09/02/23 0858)  Resp: 16 (09/02/23 0858)  BP: (!) 144/65 (09/02/23 0858)  SpO2: (!) 93 % (09/02/23 0858) Vital Signs (24h Range):  Temp:  [98.2 °F (36.8 °C)-99.7 °F (37.6 °C)] 98.2 °F (36.8 °C)  Pulse:  [] 90  Resp:  [16-20] 16  SpO2:  [91 %-97 %] 93 %  BP: (136-163)/(65-77) 144/65     Weight: 56.6 kg (124 lb 12.5 oz)  Body mass index is 22.46 kg/m².    Intake/Output Summary (Last 24 hours) " at 9/2/2023 1128  Last data filed at 9/2/2023 0633  Gross per 24 hour   Intake 397.38 ml   Output 1700 ml   Net -1302.62 ml         Physical Exam  Vitals reviewed.   Constitutional:       General: She is not in acute distress.     Appearance: She is well-developed.   HENT:      Head: Normocephalic and atraumatic.   Eyes:      General: Scleral icterus present.      Extraocular Movements: Extraocular movements intact.      Pupils: Pupils are equal, round, and reactive to light.   Neck:      Vascular: No JVD.      Trachea: No tracheal deviation.   Cardiovascular:      Rate and Rhythm: Normal rate and regular rhythm.      Heart sounds: No murmur heard.     No friction rub. No gallop.   Pulmonary:      Effort: No respiratory distress.      Breath sounds: Normal breath sounds. No wheezing or rales.   Abdominal:      General: Bowel sounds are normal. There is distension.      Palpations: Abdomen is soft. There is no mass.      Tenderness: There is no abdominal tenderness.   Musculoskeletal:         General: No deformity.      Cervical back: Neck supple.      Right lower leg: Edema present.      Left lower leg: Edema present.   Lymphadenopathy:      Cervical: No cervical adenopathy.   Skin:     General: Skin is warm and dry.      Findings: No rash.   Neurological:      Mental Status: She is alert and oriented to person, place, and time.                 Assessment/Plan:      * Hepatic encephalopathy  -Pt. With worsening confusion, reported to be constipated, likely not taking adequate dose of lactulose  -Ammonia elevated to 79 on presentation   - Continue lactulose and ammonia.   -Infectious work up neg so far     Acute cystitis  U/A with >100 WBC, many bacteria. UTI suspected to be source of recent bactermia, will treat with ceftriaxone, f/u urine and blood cultures      Chronic deep vein thrombosis (DVT) of right lower extremity  --bilateral lower extremity venous ultrasound on 08/15/2023= Chronic DVT of the right common  femoral and saphenofemoral junction. Bilateral inguinal ascites  -IVC filter in place (patient had IVC filter placed on recent hospitalization )  -patient not a candidate for anticoagulation due to recurrent GI bleeding and severe thrombocytopenia          Alcoholic cirrhosis  MELD 3.0: 21 at 8/31/2023  5:08 PM  MELD-Na: 17 at 8/31/2023  5:08 PM  Calculated from:  Serum Creatinine: 0.5 mg/dL (Using min of 1 mg/dL) at 8/31/2023  5:08 PM  Serum Sodium: 142 mmol/L (Using max of 137 mmol/L) at 8/31/2023  5:08 PM  Total Bilirubin: 3.0 mg/dL at 8/31/2023  5:08 PM  Serum Albumin: 1.7 g/dL at 8/31/2023  5:08 PM  INR(ratio): 1.8 at 8/31/2023  5:08 PM  Age at listing (hypothetical): 58 years  Sex: Female at 8/31/2023  5:08 PM          Hypokalemia  -K+ 2.5, replace PRN along with Mg and f/u repeat levels. Monitor on telemetry      History of seizure  -Continue home keppra      Thrombocytopenia  -Plts 64, 2/2 chronic liver disease, no acute issues. Monitor for signs of bleeding      VTE Risk Mitigation (From admission, onward)         Ordered     IP VTE HIGH RISK PATIENT  Once         08/31/23 2324     Place sequential compression device  Until discontinued         08/31/23 2324                Discharge Planning   BRAYAN: 9/5/2023     Code Status: Full Code   Is the patient medically ready for discharge?:     Reason for patient still in hospital (select all that apply): Patient trending condition  Discharge Plan A: Skilled Nursing Facility                  Pancho Harris MD  Department of Hospital Medicine   [facility] - Telemetry Stepdown

## 2023-09-03 LAB
ALBUMIN SERPL BCP-MCNC: 1.7 G/DL (ref 3.5–5.2)
ALP SERPL-CCNC: 147 U/L (ref 55–135)
ALT SERPL W/O P-5'-P-CCNC: 15 U/L (ref 10–44)
ANION GAP SERPL CALC-SCNC: 5 MMOL/L (ref 8–16)
ANISOCYTOSIS BLD QL SMEAR: SLIGHT
AST SERPL-CCNC: 40 U/L (ref 10–40)
BASOPHILS NFR BLD: 0 % (ref 0–1.9)
BILIRUB SERPL-MCNC: 3.8 MG/DL (ref 0.1–1)
BUN SERPL-MCNC: 4 MG/DL (ref 6–20)
BURR CELLS BLD QL SMEAR: ABNORMAL
CALCIUM SERPL-MCNC: 7.5 MG/DL (ref 8.7–10.5)
CHLORIDE SERPL-SCNC: 108 MMOL/L (ref 95–110)
CO2 SERPL-SCNC: 21 MMOL/L (ref 23–29)
CREAT SERPL-MCNC: 0.4 MG/DL (ref 0.5–1.4)
DIFFERENTIAL METHOD: ABNORMAL
EOSINOPHIL NFR BLD: 6 % (ref 0–8)
ERYTHROCYTE [DISTWIDTH] IN BLOOD BY AUTOMATED COUNT: 17.2 % (ref 11.5–14.5)
EST. GFR  (NO RACE VARIABLE): >60 ML/MIN/1.73 M^2
GLUCOSE SERPL-MCNC: 82 MG/DL (ref 70–110)
HCT VFR BLD AUTO: 25.5 % (ref 37–48.5)
HGB BLD-MCNC: 8.2 G/DL (ref 12–16)
HYPOCHROMIA BLD QL SMEAR: ABNORMAL
IMM GRANULOCYTES # BLD AUTO: ABNORMAL K/UL (ref 0–0.04)
IMM GRANULOCYTES NFR BLD AUTO: ABNORMAL % (ref 0–0.5)
LYMPHOCYTES NFR BLD: 12 % (ref 18–48)
MCH RBC QN AUTO: 34.9 PG (ref 27–31)
MCHC RBC AUTO-ENTMCNC: 32.2 G/DL (ref 32–36)
MCV RBC AUTO: 109 FL (ref 82–98)
MONOCYTES NFR BLD: 4 % (ref 4–15)
NEUTROPHILS NFR BLD: 78 % (ref 38–73)
NRBC BLD-RTO: 0 /100 WBC
PLATELET # BLD AUTO: 52 K/UL (ref 150–450)
PLATELET BLD QL SMEAR: ABNORMAL
PMV BLD AUTO: 10.3 FL (ref 9.2–12.9)
POIKILOCYTOSIS BLD QL SMEAR: SLIGHT
POLYCHROMASIA BLD QL SMEAR: ABNORMAL
POTASSIUM SERPL-SCNC: 3.1 MMOL/L (ref 3.5–5.1)
PROT SERPL-MCNC: 5.1 G/DL (ref 6–8.4)
RBC # BLD AUTO: 2.35 M/UL (ref 4–5.4)
SODIUM SERPL-SCNC: 134 MMOL/L (ref 136–145)
WBC # BLD AUTO: 1.76 K/UL (ref 3.9–12.7)

## 2023-09-03 PROCEDURE — 36415 COLL VENOUS BLD VENIPUNCTURE: CPT | Performed by: HOSPITALIST

## 2023-09-03 PROCEDURE — 51702 INSERT TEMP BLADDER CATH: CPT

## 2023-09-03 PROCEDURE — 85027 COMPLETE CBC AUTOMATED: CPT | Performed by: HOSPITALIST

## 2023-09-03 PROCEDURE — 20600001 HC STEP DOWN PRIVATE ROOM

## 2023-09-03 PROCEDURE — 99232 SBSQ HOSP IP/OBS MODERATE 35: CPT | Mod: ,,, | Performed by: STUDENT IN AN ORGANIZED HEALTH CARE EDUCATION/TRAINING PROGRAM

## 2023-09-03 PROCEDURE — 99232 PR SUBSEQUENT HOSPITAL CARE,LEVL II: ICD-10-PCS | Mod: ,,, | Performed by: STUDENT IN AN ORGANIZED HEALTH CARE EDUCATION/TRAINING PROGRAM

## 2023-09-03 PROCEDURE — 25000003 PHARM REV CODE 250: Performed by: HOSPITALIST

## 2023-09-03 PROCEDURE — 80053 COMPREHEN METABOLIC PANEL: CPT | Performed by: HOSPITALIST

## 2023-09-03 PROCEDURE — 85007 BL SMEAR W/DIFF WBC COUNT: CPT | Performed by: HOSPITALIST

## 2023-09-03 PROCEDURE — 25000003 PHARM REV CODE 250: Performed by: STUDENT IN AN ORGANIZED HEALTH CARE EDUCATION/TRAINING PROGRAM

## 2023-09-03 RX ORDER — POTASSIUM CHLORIDE 20 MEQ/1
40 TABLET, EXTENDED RELEASE ORAL ONCE
Status: COMPLETED | OUTPATIENT
Start: 2023-09-03 | End: 2023-09-03

## 2023-09-03 RX ADMIN — PANTOPRAZOLE SODIUM 40 MG: 40 GRANULE, DELAYED RELEASE ORAL at 09:09

## 2023-09-03 RX ADMIN — LACTULOSE 30 G: 20 SOLUTION ORAL at 03:09

## 2023-09-03 RX ADMIN — LITHIUM CARBONATE 300 MG: 300 TABLET, FILM COATED, EXTENDED RELEASE ORAL at 09:09

## 2023-09-03 RX ADMIN — RIFAXIMIN 550 MG: 550 TABLET ORAL at 09:09

## 2023-09-03 RX ADMIN — LEVETIRACETAM 1000 MG: 100 SOLUTION ORAL at 09:09

## 2023-09-03 RX ADMIN — POTASSIUM CHLORIDE 40 MEQ: 1500 TABLET, EXTENDED RELEASE ORAL at 10:09

## 2023-09-03 NOTE — SUBJECTIVE & OBJECTIVE
Interval History: Patient awake, oriented x 3.No new complaints. Continue lactulose and rifaximin. Awaiting return to [facility]     Review of Systems  Objective:     Vital Signs (Most Recent):  Temp: 98.2 °F (36.8 °C) (09/03/23 0749)  Pulse: 86 (09/03/23 0749)  Resp: 18 (09/03/23 0749)  BP: 123/60 (09/03/23 0749)  SpO2: 95 % (09/03/23 0749) Vital Signs (24h Range):  Temp:  [98.2 °F (36.8 °C)-98.9 °F (37.2 °C)] 98.2 °F (36.8 °C)  Pulse:  [86-96] 86  Resp:  [16-19] 18  SpO2:  [94 %-97 %] 95 %  BP: (119-133)/(6-60) 123/60     Weight: 56.6 kg (124 lb 12.5 oz)  Body mass index is 22.46 kg/m².  No intake or output data in the 24 hours ending 09/03/23 1154        Physical Exam  Vitals reviewed.   Constitutional:       General: She is not in acute distress.     Appearance: She is well-developed.   HENT:      Head: Normocephalic and atraumatic.   Eyes:      General: Scleral icterus present.      Extraocular Movements: Extraocular movements intact.      Pupils: Pupils are equal, round, and reactive to light.   Neck:      Vascular: No JVD.      Trachea: No tracheal deviation.   Cardiovascular:      Rate and Rhythm: Normal rate and regular rhythm.      Heart sounds: No murmur heard.     No friction rub. No gallop.   Pulmonary:      Effort: No respiratory distress.      Breath sounds: Normal breath sounds. No wheezing or rales.   Abdominal:      General: Bowel sounds are normal. There is distension.      Palpations: Abdomen is soft. There is no mass.      Tenderness: There is no abdominal tenderness.   Musculoskeletal:         General: No deformity.      Cervical back: Neck supple.      Right lower leg: Edema present.      Left lower leg: Edema present.   Lymphadenopathy:      Cervical: No cervical adenopathy.   Skin:     General: Skin is warm and dry.      Findings: No rash.   Neurological:      Mental Status: She is alert and oriented to person, place, and time.

## 2023-09-03 NOTE — PROGRESS NOTES
"[facility] - Telemetry [facility] Medicine  Progress Note    Patient Name: Marely Hamilton  MRN: [id number]  Patient Class: IP- Inpatient   Admission Date: 8/31/2023  Length of Stay: 2 days  Attending Physician: Pancho Harris MD  Primary Care Provider: Viktor Ross MD        Subjective:     Principal Problem:Hepatic encephalopathy        HPI:  59 yo F with PMHx alcoholic cirrhosis, recurrent GI bleeds, and chronic LE DVT s/p IVC filter placement who presents to the ED from [facility] for reported "shortness of breath, high ammonia levels and constipation x a few days". Pt. Mildly confused at time of my interview, so chronology of events is limited, but the patietn reports that she has had worsening swelling in her feet and abdomen as well as some SOB. She believes the fluid in her abdomen and legs is causing her to feel weak and SOB. She also has had constipation despite being on lactulose for HE. She denies any fevers, chills, nausea, or vomiting. Of note, pt. Recently admitted to [facility] for E.coli bacteremia thought to be related to a UTI. ID recommended midline placement for ceftriaxone 2gm IV daily for total of 2 wks from 8/14 to 8/28/23. Pt. Reports that she completed this and her midline was removed.         Overview/Hospital Course:  No notes on file    Interval History: Patient awake, oriented x 3.No new complaints. Continue lactulose and rifaximin. Awaiting return to SNF     Review of Systems  Objective:     Vital Signs (Most Recent):  Temp: 98.2 °F (36.8 °C) (09/03/23 0749)  Pulse: 86 (09/03/23 0749)  Resp: 18 (09/03/23 0749)  BP: 123/60 (09/03/23 0749)  SpO2: 95 % (09/03/23 0749) Vital Signs (24h Range):  Temp:  [98.2 °F (36.8 °C)-98.9 °F (37.2 °C)] 98.2 °F (36.8 °C)  Pulse:  [86-96] 86  Resp:  [16-19] 18  SpO2:  [94 %-97 %] 95 %  BP: (119-133)/(6-60) 123/60     Weight: 56.6 kg (124 lb 12.5 oz)  Body mass index is 22.46 kg/m².  No intake or output data in the 24 hours ending 09/03/23 1154      "   Physical Exam  Vitals reviewed.   Constitutional:       General: She is not in acute distress.     Appearance: She is well-developed.   HENT:      Head: Normocephalic and atraumatic.   Eyes:      General: Scleral icterus present.      Extraocular Movements: Extraocular movements intact.      Pupils: Pupils are equal, round, and reactive to light.   Neck:      Vascular: No JVD.      Trachea: No tracheal deviation.   Cardiovascular:      Rate and Rhythm: Normal rate and regular rhythm.      Heart sounds: No murmur heard.     No friction rub. No gallop.   Pulmonary:      Effort: No respiratory distress.      Breath sounds: Normal breath sounds. No wheezing or rales.   Abdominal:      General: Bowel sounds are normal. There is distension.      Palpations: Abdomen is soft. There is no mass.      Tenderness: There is no abdominal tenderness.   Musculoskeletal:         General: No deformity.      Cervical back: Neck supple.      Right lower leg: Edema present.      Left lower leg: Edema present.   Lymphadenopathy:      Cervical: No cervical adenopathy.   Skin:     General: Skin is warm and dry.      Findings: No rash.   Neurological:      Mental Status: She is alert and oriented to person, place, and time.                 Assessment/Plan:      * Hepatic encephalopathy  -Pt. With worsening confusion, reported to be constipated, likely not taking adequate dose of lactulose  -Ammonia elevated to 79 on presentation   - Continue lactulose and ammonia.   -Infectious work up neg so far     Acute cystitis  U/A with >100 WBC, many bacteria. UTI suspected to be source of recent bactermia, will treat with ceftriaxone, f/u urine and blood cultures      Chronic deep vein thrombosis (DVT) of right lower extremity  --bilateral lower extremity venous ultrasound on 08/15/2023= Chronic DVT of the right common femoral and saphenofemoral junction. Bilateral inguinal ascites  -IVC filter in place (patient had IVC filter placed on recent  hospitalization )  -patient not a candidate for anticoagulation due to recurrent GI bleeding and severe thrombocytopenia          Alcoholic cirrhosis  MELD 3.0: 21 at 8/31/2023  5:08 PM  MELD-Na: 17 at 8/31/2023  5:08 PM  Calculated from:  Serum Creatinine: 0.5 mg/dL (Using min of 1 mg/dL) at 8/31/2023  5:08 PM  Serum Sodium: 142 mmol/L (Using max of 137 mmol/L) at 8/31/2023  5:08 PM  Total Bilirubin: 3.0 mg/dL at 8/31/2023  5:08 PM  Serum Albumin: 1.7 g/dL at 8/31/2023  5:08 PM  INR(ratio): 1.8 at 8/31/2023  5:08 PM  Age at listing (hypothetical): 58 years  Sex: Female at 8/31/2023  5:08 PM          Hypokalemia  -K+ 2.5, replace PRN along with Mg and f/u repeat levels. Monitor on telemetry      Severe protein-calorie malnutrition  Nutrition consulted. Most recent weight and BMI monitored-     Measurements:  Wt Readings from Last 1 Encounters:   09/02/23 56.6 kg (124 lb 12.5 oz)   Body mass index is 22.46 kg/m².    Patient has been screened and assessed by RD.    Malnutrition Type:  Context: social/environmental circumstances  Level: severe    Malnutrition Characteristic Summary:  Weight Loss (Malnutrition): greater than 7.5% in 3 months  Subcutaneous Fat (Malnutrition): severe depletion  Muscle Mass (Malnutrition): severe depletion    Interventions/Recommendations (treatment strategy):  1. Liberalize diet to Low Na & add Boost Plus ONS. 2. RD to monitor & follow-up.    History of seizure  -Continue home keppra      Thrombocytopenia  -Plts 63, 2/2 chronic liver disease, no acute issues. Monitor for signs of bleeding      VTE Risk Mitigation (From admission, onward)         Ordered     IP VTE HIGH RISK PATIENT  Once         08/31/23 2324     Place sequential compression device  Until discontinued         08/31/23 2324                Discharge Planning   BRAYAN: 9/5/2023     Code Status: Full Code   Is the patient medically ready for discharge?: Yes    Reason for patient still in hospital (select all that apply): Patient  trending condition  Discharge Plan A: Skilled Nursing Facility                  Pancho Harris MD  Department of Hospital Medicine   [facility] - Telemetry Stepdown

## 2023-09-03 NOTE — NURSING
Went into pt's room to place boots on and turn her and administer her rocephin 2G, but pt refused everything. She wouldn't even allow herself to be turned. I tried to reason with her, but she was very adamant about refusing everything.

## 2023-09-03 NOTE — ASSESSMENT & PLAN NOTE
Nutrition consulted. Most recent weight and BMI monitored-     Measurements:  Wt Readings from Last 1 Encounters:   09/02/23 56.6 kg (124 lb 12.5 oz)   Body mass index is 22.46 kg/m².    Patient has been screened and assessed by RD.    Malnutrition Type:  Context: social/environmental circumstances  Level: severe    Malnutrition Characteristic Summary:  Weight Loss (Malnutrition): greater than 7.5% in 3 months  Subcutaneous Fat (Malnutrition): severe depletion  Muscle Mass (Malnutrition): severe depletion    Interventions/Recommendations (treatment strategy):  1. Liberalize diet to Low Na & add Boost Plus ONS. 2. RD to monitor & follow-up.

## 2023-09-03 NOTE — PLAN OF CARE
Pt's personality completely changed over the coarse of the night. She refused antibiotics, rufused to be turned, and began to curse at the nurse and throw her calllight.      Problem: Adult Inpatient Plan of Care  Goal: Plan of Care Review  Outcome: Ongoing, Progressing  Goal: Patient-Specific Goal (Individualized)  Outcome: Ongoing, Progressing  Goal: Absence of Hospital-Acquired Illness or Injury  Outcome: Ongoing, Progressing  Goal: Optimal Comfort and Wellbeing  Outcome: Ongoing, Progressing  Goal: Readiness for Transition of Care  Outcome: Ongoing, Progressing     Problem: Bleeding (Sepsis/Septic Shock)  Goal: Absence of Bleeding  Outcome: Ongoing, Progressing     Problem: Glycemic Control Impaired (Sepsis/Septic Shock)  Goal: Blood Glucose Level Within Desired Range  Outcome: Ongoing, Progressing     Problem: Infection Progression (Sepsis/Septic Shock)  Goal: Absence of Infection Signs and Symptoms  Outcome: Ongoing, Progressing     Problem: Nutrition Impaired (Sepsis/Septic Shock)  Goal: Optimal Nutrition Intake  Outcome: Ongoing, Progressing

## 2023-09-04 PROCEDURE — 25000003 PHARM REV CODE 250: Performed by: HOSPITALIST

## 2023-09-04 PROCEDURE — 20600001 HC STEP DOWN PRIVATE ROOM

## 2023-09-04 PROCEDURE — 99232 SBSQ HOSP IP/OBS MODERATE 35: CPT | Mod: ,,, | Performed by: STUDENT IN AN ORGANIZED HEALTH CARE EDUCATION/TRAINING PROGRAM

## 2023-09-04 PROCEDURE — 25000003 PHARM REV CODE 250: Performed by: STUDENT IN AN ORGANIZED HEALTH CARE EDUCATION/TRAINING PROGRAM

## 2023-09-04 PROCEDURE — 99232 PR SUBSEQUENT HOSPITAL CARE,LEVL II: ICD-10-PCS | Mod: ,,, | Performed by: STUDENT IN AN ORGANIZED HEALTH CARE EDUCATION/TRAINING PROGRAM

## 2023-09-04 RX ORDER — POTASSIUM CHLORIDE 20 MEQ/1
40 TABLET, EXTENDED RELEASE ORAL ONCE
Status: COMPLETED | OUTPATIENT
Start: 2023-09-04 | End: 2023-09-04

## 2023-09-04 RX ADMIN — POTASSIUM CHLORIDE 40 MEQ: 1500 TABLET, EXTENDED RELEASE ORAL at 10:09

## 2023-09-04 RX ADMIN — LITHIUM CARBONATE 300 MG: 300 TABLET, FILM COATED, EXTENDED RELEASE ORAL at 08:09

## 2023-09-04 RX ADMIN — FUROSEMIDE 20 MG: 20 TABLET ORAL at 10:09

## 2023-09-04 RX ADMIN — PANTOPRAZOLE SODIUM 40 MG: 40 GRANULE, DELAYED RELEASE ORAL at 10:09

## 2023-09-04 RX ADMIN — PANTOPRAZOLE SODIUM 40 MG: 40 GRANULE, DELAYED RELEASE ORAL at 08:09

## 2023-09-04 RX ADMIN — LEVETIRACETAM 1000 MG: 100 SOLUTION ORAL at 10:09

## 2023-09-04 RX ADMIN — LEVETIRACETAM 1000 MG: 100 SOLUTION ORAL at 08:09

## 2023-09-04 RX ADMIN — RIFAXIMIN 550 MG: 550 TABLET ORAL at 08:09

## 2023-09-04 RX ADMIN — RIFAXIMIN 550 MG: 550 TABLET ORAL at 10:09

## 2023-09-04 NOTE — PROGRESS NOTES
"[facility] - Telemetry [facility] Medicine  Progress Note    Patient Name: Marely Hamilton  MRN: [id number]  Patient Class: IP- Inpatient   Admission Date: 8/31/2023  Length of Stay: 3 days  Attending Physician: Pancho Harris MD  Primary Care Provider: Viktor Ross MD        Subjective:     Principal Problem:Hepatic encephalopathy        HPI:  59 yo F with PMHx alcoholic cirrhosis, recurrent GI bleeds, and chronic LE DVT s/p IVC filter placement who presents to the ED from [facility] for reported "shortness of breath, high ammonia levels and constipation x a few days". Pt. Mildly confused at time of my interview, so chronology of events is limited, but the patietn reports that she has had worsening swelling in her feet and abdomen as well as some SOB. She believes the fluid in her abdomen and legs is causing her to feel weak and SOB. She also has had constipation despite being on lactulose for HE. She denies any fevers, chills, nausea, or vomiting. Of note, pt. Recently admitted to [facility] for E.coli bacteremia thought to be related to a UTI. ID recommended midline placement for ceftriaxone 2gm IV daily for total of 2 wks from 8/14 to 8/28/23. Pt. Reports that she completed this and her midline was removed.         Overview/Hospital Course:  No notes on file    Interval History: Patient awake, oriented x 3.No new complaints today. Refusing wound acre for sacral wounds. Continue lactulose and rifaximin. Awaiting return to [facility]     Review of Systems  Objective:     Vital Signs (Most Recent):  Temp: 99.6 °F (37.6 °C) (09/04/23 1130)  Pulse: 89 (09/04/23 1130)  Resp: 18 (09/04/23 1130)  BP: (!) 124/58 (09/04/23 1130)  SpO2: 96 % (09/04/23 1130) Vital Signs (24h Range):  Temp:  [96.2 °F (35.7 °C)-100 °F (37.8 °C)] 99.6 °F (37.6 °C)  Pulse:  [77-93] 89  Resp:  [15-19] 18  SpO2:  [91 %-98 %] 96 %  BP: (113-137)/(55-63) 124/58     Weight: 56.6 kg (124 lb 12.5 oz)  Body mass index is 22.46 kg/m².    Intake/Output " Summary (Last 24 hours) at 9/4/2023 1142  Last data filed at 9/4/2023 1137  Gross per 24 hour   Intake --   Output 2300 ml   Net -2300 ml           Physical Exam  Vitals reviewed.   Constitutional:       General: She is not in acute distress.     Appearance: She is well-developed.   HENT:      Head: Normocephalic and atraumatic.   Eyes:      General: Scleral icterus present.      Extraocular Movements: Extraocular movements intact.      Pupils: Pupils are equal, round, and reactive to light.   Neck:      Vascular: No JVD.      Trachea: No tracheal deviation.   Cardiovascular:      Rate and Rhythm: Normal rate and regular rhythm.      Heart sounds: No murmur heard.     No friction rub. No gallop.   Pulmonary:      Effort: No respiratory distress.      Breath sounds: Normal breath sounds. No wheezing or rales.   Abdominal:      General: Bowel sounds are normal. There is distension.      Palpations: Abdomen is soft. There is no mass.      Tenderness: There is no abdominal tenderness.   Musculoskeletal:         General: No deformity.      Cervical back: Neck supple.      Right lower leg: Edema present.      Left lower leg: Edema present.   Lymphadenopathy:      Cervical: No cervical adenopathy.   Skin:     General: Skin is warm and dry.      Findings: No rash.   Neurological:      Mental Status: She is alert and oriented to person, place, and time.                 Assessment/Plan:      * Hepatic encephalopathy  -Pt. With worsening confusion, reported to be constipated, likely not taking adequate dose of lactulose  -Ammonia elevated to 79 on presentation   - Continue lactulose and ammonia.   -Infectious work up neg so far     Acute cystitis  U/A with >100 WBC, many bacteria. UTI suspected to be source of recent bactermia, will treat with ceftriaxone, f/u urine and blood cultures      Chronic deep vein thrombosis (DVT) of right lower extremity  --bilateral lower extremity venous ultrasound on 08/15/2023= Chronic DVT of  the right common femoral and saphenofemoral junction. Bilateral inguinal ascites  -IVC filter in place (patient had IVC filter placed on recent hospitalization )  -patient not a candidate for anticoagulation due to recurrent GI bleeding and severe thrombocytopenia          Alcoholic cirrhosis  MELD 3.0: 21 at 8/31/2023  5:08 PM  MELD-Na: 17 at 8/31/2023  5:08 PM  Calculated from:  Serum Creatinine: 0.5 mg/dL (Using min of 1 mg/dL) at 8/31/2023  5:08 PM  Serum Sodium: 142 mmol/L (Using max of 137 mmol/L) at 8/31/2023  5:08 PM  Total Bilirubin: 3.0 mg/dL at 8/31/2023  5:08 PM  Serum Albumin: 1.7 g/dL at 8/31/2023  5:08 PM  INR(ratio): 1.8 at 8/31/2023  5:08 PM  Age at listing (hypothetical): 58 years  Sex: Female at 8/31/2023  5:08 PM          Hypokalemia  -K+ 2.5, replace PRN along with Mg and f/u repeat levels. Monitor on telemetry      Severe protein-calorie malnutrition  Nutrition consulted. Most recent weight and BMI monitored-     Measurements:  Wt Readings from Last 1 Encounters:   09/02/23 56.6 kg (124 lb 12.5 oz)   Body mass index is 22.46 kg/m².    Patient has been screened and assessed by RD.    Malnutrition Type:  Context: social/environmental circumstances  Level: severe    Malnutrition Characteristic Summary:  Weight Loss (Malnutrition): greater than 7.5% in 3 months  Subcutaneous Fat (Malnutrition): severe depletion  Muscle Mass (Malnutrition): severe depletion    Interventions/Recommendations (treatment strategy):  1. Liberalize diet to Low Na & add Boost Plus ONS. 2. RD to monitor & follow-up.    History of seizure  -Continue home keppra      Thrombocytopenia  -Plts 63, 2/2 chronic liver disease, no acute issues. Monitor for signs of bleeding      VTE Risk Mitigation (From admission, onward)         Ordered     IP VTE HIGH RISK PATIENT  Once         08/31/23 2324     Place sequential compression device  Until discontinued         08/31/23 2324                Discharge Planning   BRAYAN: 9/5/2023     Code  Status: Full Code   Is the patient medically ready for discharge?: Yes    Reason for patient still in hospital (select all that apply): Patient trending condition  Discharge Plan A: Skilled Nursing Facility                  Pancho Harris MD  Department of Hospital Medicine   [facility] - Telemetry Stepdown

## 2023-09-04 NOTE — PLAN OF CARE
Problem: Adult Inpatient Plan of Care  Goal: Plan of Care Review  Outcome: Ongoing, Progressing  Goal: Patient-Specific Goal (Individualized)  Outcome: Ongoing, Progressing  Goal: Absence of Hospital-Acquired Illness or Injury  Outcome: Ongoing, Progressing  Goal: Optimal Comfort and Wellbeing  Outcome: Ongoing, Progressing  Goal: Readiness for Transition of Care  Outcome: Ongoing, Progressing     Problem: Infection  Goal: Absence of Infection Signs and Symptoms  Outcome: Ongoing, Progressing     Problem: Adjustment to Illness (Sepsis/Septic Shock)  Goal: Optimal Coping  Outcome: Ongoing, Progressing     Problem: Bleeding (Sepsis/Septic Shock)  Goal: Absence of Bleeding  Outcome: Ongoing, Progressing     Problem: Glycemic Control Impaired (Sepsis/Septic Shock)  Goal: Blood Glucose Level Within Desired Range  Outcome: Ongoing, Progressing     Problem: Infection Progression (Sepsis/Septic Shock)  Goal: Absence of Infection Signs and Symptoms  Outcome: Ongoing, Progressing     Problem: Nutrition Impaired (Sepsis/Septic Shock)  Goal: Optimal Nutrition Intake  Outcome: Ongoing, Progressing     Problem: Fluid Imbalance (Pneumonia)  Goal: Fluid Balance  Outcome: Ongoing, Progressing     Problem: Infection (Pneumonia)  Goal: Resolution of Infection Signs and Symptoms  Outcome: Ongoing, Progressing     Problem: Respiratory Compromise (Pneumonia)  Goal: Effective Oxygenation and Ventilation  Outcome: Ongoing, Progressing     Problem: Impaired Wound Healing  Goal: Optimal Wound Healing  Outcome: Ongoing, Progressing

## 2023-09-04 NOTE — SUBJECTIVE & OBJECTIVE
Interval History: Patient awake, oriented x 3.No new complaints today. Refusing wound acre for sacral wounds. Continue lactulose and rifaximin. Awaiting return to [facility]     Review of Systems  Objective:     Vital Signs (Most Recent):  Temp: 99.6 °F (37.6 °C) (09/04/23 1130)  Pulse: 89 (09/04/23 1130)  Resp: 18 (09/04/23 1130)  BP: (!) 124/58 (09/04/23 1130)  SpO2: 96 % (09/04/23 1130) Vital Signs (24h Range):  Temp:  [96.2 °F (35.7 °C)-100 °F (37.8 °C)] 99.6 °F (37.6 °C)  Pulse:  [77-93] 89  Resp:  [15-19] 18  SpO2:  [91 %-98 %] 96 %  BP: (113-137)/(55-63) 124/58     Weight: 56.6 kg (124 lb 12.5 oz)  Body mass index is 22.46 kg/m².    Intake/Output Summary (Last 24 hours) at 9/4/2023 1142  Last data filed at 9/4/2023 1137  Gross per 24 hour   Intake --   Output 2300 ml   Net -2300 ml           Physical Exam  Vitals reviewed.   Constitutional:       General: She is not in acute distress.     Appearance: She is well-developed.   HENT:      Head: Normocephalic and atraumatic.   Eyes:      General: Scleral icterus present.      Extraocular Movements: Extraocular movements intact.      Pupils: Pupils are equal, round, and reactive to light.   Neck:      Vascular: No JVD.      Trachea: No tracheal deviation.   Cardiovascular:      Rate and Rhythm: Normal rate and regular rhythm.      Heart sounds: No murmur heard.     No friction rub. No gallop.   Pulmonary:      Effort: No respiratory distress.      Breath sounds: Normal breath sounds. No wheezing or rales.   Abdominal:      General: Bowel sounds are normal. There is distension.      Palpations: Abdomen is soft. There is no mass.      Tenderness: There is no abdominal tenderness.   Musculoskeletal:         General: No deformity.      Cervical back: Neck supple.      Right lower leg: Edema present.      Left lower leg: Edema present.   Lymphadenopathy:      Cervical: No cervical adenopathy.   Skin:     General: Skin is warm and dry.      Findings: No rash.   Neurological:       Mental Status: She is alert and oriented to person, place, and time.

## 2023-09-04 NOTE — CONSULTS
Patient consult for wound care to the sacral area, when attempt to assess the patient states that there's nothing wrong with her sacral and then she refuse. Wound care signing off please re consult if need.

## 2023-09-05 LAB
ALBUMIN SERPL BCP-MCNC: 1.7 G/DL (ref 3.5–5.2)
ALP SERPL-CCNC: 155 U/L (ref 55–135)
ALT SERPL W/O P-5'-P-CCNC: 16 U/L (ref 10–44)
ANION GAP SERPL CALC-SCNC: 6 MMOL/L (ref 8–16)
AST SERPL-CCNC: 40 U/L (ref 10–40)
BASOPHILS # BLD AUTO: 0 K/UL (ref 0–0.2)
BASOPHILS NFR BLD: 0 % (ref 0–1.9)
BILIRUB SERPL-MCNC: 3.5 MG/DL (ref 0.1–1)
BUN SERPL-MCNC: 5 MG/DL (ref 6–20)
CALCIUM SERPL-MCNC: 7.8 MG/DL (ref 8.7–10.5)
CHLORIDE SERPL-SCNC: 108 MMOL/L (ref 95–110)
CO2 SERPL-SCNC: 20 MMOL/L (ref 23–29)
CREAT SERPL-MCNC: 0.5 MG/DL (ref 0.5–1.4)
DIFFERENTIAL METHOD: ABNORMAL
EOSINOPHIL # BLD AUTO: 0.1 K/UL (ref 0–0.5)
EOSINOPHIL NFR BLD: 4.9 % (ref 0–8)
ERYTHROCYTE [DISTWIDTH] IN BLOOD BY AUTOMATED COUNT: 16.7 % (ref 11.5–14.5)
EST. GFR  (NO RACE VARIABLE): >60 ML/MIN/1.73 M^2
GLUCOSE SERPL-MCNC: 127 MG/DL (ref 70–110)
HCT VFR BLD AUTO: 27 % (ref 37–48.5)
HGB BLD-MCNC: 8.5 G/DL (ref 12–16)
IMM GRANULOCYTES # BLD AUTO: 0.01 K/UL (ref 0–0.04)
IMM GRANULOCYTES NFR BLD AUTO: 0.5 % (ref 0–0.5)
INR PPP: 1.7 (ref 0.8–1.2)
LYMPHOCYTES # BLD AUTO: 0.5 K/UL (ref 1–4.8)
LYMPHOCYTES NFR BLD: 26.5 % (ref 18–48)
MCH RBC QN AUTO: 35 PG (ref 27–31)
MCHC RBC AUTO-ENTMCNC: 31.5 G/DL (ref 32–36)
MCV RBC AUTO: 111 FL (ref 82–98)
MONOCYTES # BLD AUTO: 0.2 K/UL (ref 0.3–1)
MONOCYTES NFR BLD: 13 % (ref 4–15)
NEUTROPHILS # BLD AUTO: 1 K/UL (ref 1.8–7.7)
NEUTROPHILS NFR BLD: 55.1 % (ref 38–73)
NRBC BLD-RTO: 0 /100 WBC
PLATELET # BLD AUTO: 54 K/UL (ref 150–450)
PMV BLD AUTO: 10.6 FL (ref 9.2–12.9)
POTASSIUM SERPL-SCNC: 3.2 MMOL/L (ref 3.5–5.1)
PROT SERPL-MCNC: 5.4 G/DL (ref 6–8.4)
PROTHROMBIN TIME: 17.4 SEC (ref 9–12.5)
RBC # BLD AUTO: 2.43 M/UL (ref 4–5.4)
SODIUM SERPL-SCNC: 134 MMOL/L (ref 136–145)
WBC # BLD AUTO: 1.85 K/UL (ref 3.9–12.7)

## 2023-09-05 PROCEDURE — 97162 PT EVAL MOD COMPLEX 30 MIN: CPT

## 2023-09-05 PROCEDURE — 36415 COLL VENOUS BLD VENIPUNCTURE: CPT | Performed by: STUDENT IN AN ORGANIZED HEALTH CARE EDUCATION/TRAINING PROGRAM

## 2023-09-05 PROCEDURE — 25000003 PHARM REV CODE 250: Performed by: HOSPITALIST

## 2023-09-05 PROCEDURE — 85025 COMPLETE CBC W/AUTO DIFF WBC: CPT | Performed by: STUDENT IN AN ORGANIZED HEALTH CARE EDUCATION/TRAINING PROGRAM

## 2023-09-05 PROCEDURE — 80053 COMPREHEN METABOLIC PANEL: CPT | Performed by: STUDENT IN AN ORGANIZED HEALTH CARE EDUCATION/TRAINING PROGRAM

## 2023-09-05 PROCEDURE — 20600001 HC STEP DOWN PRIVATE ROOM

## 2023-09-05 PROCEDURE — 99231 PR SUBSEQUENT HOSPITAL CARE,LEVL I: ICD-10-PCS | Mod: ,,, | Performed by: STUDENT IN AN ORGANIZED HEALTH CARE EDUCATION/TRAINING PROGRAM

## 2023-09-05 PROCEDURE — 99231 SBSQ HOSP IP/OBS SF/LOW 25: CPT | Mod: ,,, | Performed by: STUDENT IN AN ORGANIZED HEALTH CARE EDUCATION/TRAINING PROGRAM

## 2023-09-05 PROCEDURE — 85610 PROTHROMBIN TIME: CPT | Performed by: STUDENT IN AN ORGANIZED HEALTH CARE EDUCATION/TRAINING PROGRAM

## 2023-09-05 PROCEDURE — 97530 THERAPEUTIC ACTIVITIES: CPT

## 2023-09-05 RX ORDER — ALPRAZOLAM 0.25 MG/1
0.25 TABLET ORAL 2 TIMES DAILY PRN
Status: ON HOLD
Start: 2023-09-05 | End: 2023-12-08 | Stop reason: SDUPTHER

## 2023-09-05 RX ORDER — LACTULOSE 10 G/15ML
30 SOLUTION ORAL 3 TIMES DAILY
Qty: 4050 ML | Refills: 2 | Status: ON HOLD
Start: 2023-09-05 | End: 2023-12-08 | Stop reason: HOSPADM

## 2023-09-05 RX ADMIN — LEVETIRACETAM 1000 MG: 100 SOLUTION ORAL at 09:09

## 2023-09-05 RX ADMIN — RIFAXIMIN 550 MG: 550 TABLET ORAL at 09:09

## 2023-09-05 RX ADMIN — PANTOPRAZOLE SODIUM 40 MG: 40 GRANULE, DELAYED RELEASE ORAL at 09:09

## 2023-09-05 RX ADMIN — LACTULOSE 30 G: 20 SOLUTION ORAL at 09:09

## 2023-09-05 RX ADMIN — FUROSEMIDE 20 MG: 20 TABLET ORAL at 09:09

## 2023-09-05 RX ADMIN — LITHIUM CARBONATE 300 MG: 300 TABLET, FILM COATED, EXTENDED RELEASE ORAL at 09:09

## 2023-09-05 NOTE — PT/OT/SLP PROGRESS
"Occupational Therapy   Treatment    Name: Marely Hamilton  MRN: [id number]  Admitting Diagnosis:  Hepatic encephalopathy       Recommendations:     Discharge Recommendations: nursing facility, skilled  Discharge Equipment Recommendations:  to be determined by next level of care  Barriers to discharge:  Decreased caregiver support    Assessment:     Marely Hamilton is a 58 y.o. female with a medical diagnosis of Hepatic encephalopathy.  She presents with limited session on this date.  Pt continues to require (A) with all activities. Performance deficits affecting function are weakness, impaired endurance, impaired self care skills, impaired functional mobility, impaired balance, impaired cognition, decreased coordination, decreased safety awareness, decreased lower extremity function, decreased upper extremity function.     Rehab Prognosis:  Fair; patient would benefit from acute skilled OT services to address these deficits and reach maximum level of function.       Plan:     Patient to be seen 3 x/week to address the above listed problems via self-care/home management, therapeutic activities, therapeutic exercises, neuromuscular re-education, cognitive retraining  Plan of Care Expires: 10/02/23  Plan of Care Reviewed with: patient    Subjective     Chief Complaint: pain in buttock region during toileting due to wounds.  Patient/Family Comments/goals: "Don't touch me (during cleaning)."  Pain/Comfort:  Pain Rating 1:  (did not rate)  Location 1:  (buttocks during toileting)  Pain Addressed 1: Reposition, Distraction, Nurse notified, Cessation of Activity  Pain Rating Post-Intervention 1:  (did not rate)    Objective:     Communicated with: RN prior to session.  Patient found supine with telemetry, baugh catheter (no family present) upon OT entry to room.    General Precautions: Standard, fall    Orthopedic Precautions:N/A  Braces: N/A  Respiratory Status: Room air     Occupational Performance:     Bed Mobility:  "   Patient completed Rolling/Turning to Left with  maximal assistance x2 trials  Patient completed Rolling/Turning to Right with maximal assistance  Patient completed Scooting/Bridging with total assistance up HOB while supine    Activities of Daily Living:  Toileting: total assistance while supine to BM incontinence      [facility] 6 Click ADL: 13    Treatment & Education:  Attempted supine to sit however upon attempt noted that pt was soiled with BM.  Provided hygiene for cleaning of pt while supine, pt allowed herself to be cleaning ~75% then adamantly refused further cleaning of buttock or perianal region.  OT attempted to discussed importance of maintain hygiene post BM due to significant wounds to buttock however pt continued to refuse.  Notified RN of pt refusal as well as IV found off pt in bed at start of session.    Provided passive stretch to left ankle for dorsiflexion x 3 trials & attempted for right ankle however pt reported it hurt and requested OT stop.  Therefore OT did not perform further.  Pt requested therapy session to end at this point.  RN notified of all above. Pt had no further questions & when asked whether there were any concerns pt reported none.      Patient left supine with all lines intact, call button in reach, bed alarm on, RN notified, and RN present    GOALS:   Multidisciplinary Problems       Occupational Therapy Goals          Problem: Occupational Therapy    Goal Priority Disciplines Outcome Interventions   Occupational Therapy Goal     OT, PT/OT Ongoing, Progressing    Description: Goals to be met by: 9/25     Patient will increase functional independence with ADLs by performing:    UE Dressing with Stand-by Assistance.  LE Dressing with Maximum Assistance.  Grooming while seated with Set-up Assistance.  Toileting from bedside commode with Moderate Assistance for hygiene and clothing management.   Rolling to Bilateral with Stand-by Assistance.   Supine to sit with Minimal  Assistance.  Stand pivot transfers with Moderate Assistance.                         Time Tracking:     OT Date of Treatment: 09/05/23  OT Start Time: 1054  OT Stop Time: 1110  OT Total Time (min): 16 min    Billable Minutes:Therapeutic Activity 16    OT/DC: OT          9/5/2023

## 2023-09-05 NOTE — PLAN OF CARE
Problem: Occupational Therapy  Goal: Occupational Therapy Goal  Description: Goals to be met by: 9/25     Patient will increase functional independence with ADLs by performing:    UE Dressing with Stand-by Assistance.  LE Dressing with Maximum Assistance.  Grooming while seated with Set-up Assistance.  Toileting from bedside commode with Moderate Assistance for hygiene and clothing management.   Rolling to Bilateral with Stand-by Assistance.   Supine to sit with Minimal Assistance.  Stand pivot transfers with Moderate Assistance.    Outcome: Ongoing, Progressing     Goals remain appropriate

## 2023-09-05 NOTE — PLAN OF CARE
Problem: Adult Inpatient Plan of Care  Goal: Plan of Care Review  Outcome: Ongoing, Progressing  Goal: Patient-Specific Goal (Individualized)  Outcome: Ongoing, Progressing  Goal: Absence of Hospital-Acquired Illness or Injury  Outcome: Ongoing, Progressing  Goal: Optimal Comfort and Wellbeing  Outcome: Ongoing, Progressing  Goal: Readiness for Transition of Care  Outcome: Ongoing, Progressing     Problem: Infection  Goal: Absence of Infection Signs and Symptoms  Outcome: Ongoing, Progressing     Problem: Adjustment to Illness (Sepsis/Septic Shock)  Goal: Optimal Coping  Outcome: Ongoing, Progressing     Problem: Bleeding (Sepsis/Septic Shock)  Goal: Absence of Bleeding  Outcome: Ongoing, Progressing     Problem: Glycemic Control Impaired (Sepsis/Septic Shock)  Goal: Blood Glucose Level Within Desired Range  Outcome: Ongoing, Progressing     Problem: Infection Progression (Sepsis/Septic Shock)  Goal: Absence of Infection Signs and Symptoms  Outcome: Ongoing, Progressing     Problem: Nutrition Impaired (Sepsis/Septic Shock)  Goal: Optimal Nutrition Intake  Outcome: Ongoing, Progressing     Problem: Fluid Imbalance (Pneumonia)  Goal: Fluid Balance  Outcome: Ongoing, Progressing     Problem: Infection (Pneumonia)  Goal: Resolution of Infection Signs and Symptoms  Outcome: Ongoing, Progressing     Problem: Respiratory Compromise (Pneumonia)  Goal: Effective Oxygenation and Ventilation  Outcome: Ongoing, Progressing     Problem: Impaired Wound Healing  Goal: Optimal Wound Healing  Outcome: Ongoing, Progressing     Problem: Skin Injury Risk Increased  Goal: Skin Health and Integrity  Outcome: Ongoing, Progressing     AA0x3. On RA. VSS. Pt refused lactulose order. No c/o pain or discomfort. No acute changes noted. Call light in hand. Safety precaution maintained. Care is ongoing.

## 2023-09-05 NOTE — PLAN OF CARE
[facility] - Telemetry Stepdown  Discharge Reassessment    Primary Care Provider: Viktor Ross MD    Expected Discharge Date: 9/6/2023    Reassessment (most recent)       Discharge Reassessment - 09/05/23 1519          Discharge Reassessment    Assessment Type Discharge Planning Reassessment     Did the patient's condition or plan change since previous assessment? No     Discharge Plan discussed with: Patient     Communicated RBAYAN with patient/caregiver Yes     Discharge Plan A Skilled Nursing Facility     Discharge Plan B New Nursing Home placement - [facility]     DME Needed Upon Discharge  none     Transition of Care Barriers No family/friends to help        Post-Acute Status    Post-Acute Authorization Placement     Post-Acute Placement Status Referrals Sent                   Jyoti Jones RN  Ext [phone]

## 2023-09-05 NOTE — PROGRESS NOTES
"[facility] - Telemetry [facility] Medicine  Progress Note    Patient Name: Marely Hamilton  MRN: [id number]  Patient Class: IP- Inpatient   Admission Date: 8/31/2023  Length of Stay: 4 days  Attending Physician: Pancho Harris MD  Primary Care Provider: Viktor Ross MD        Subjective:     Principal Problem:Hepatic encephalopathy        HPI:  57 yo F with PMHx alcoholic cirrhosis, recurrent GI bleeds, and chronic LE DVT s/p IVC filter placement who presents to the ED from [facility] for reported "shortness of breath, high ammonia levels and constipation x a few days". Pt. Mildly confused at time of my interview, so chronology of events is limited, but the patietn reports that she has had worsening swelling in her feet and abdomen as well as some SOB. She believes the fluid in her abdomen and legs is causing her to feel weak and SOB. She also has had constipation despite being on lactulose for HE. She denies any fevers, chills, nausea, or vomiting. Of note, pt. Recently admitted to [facility] for E.coli bacteremia thought to be related to a UTI. ID recommended midline placement for ceftriaxone 2gm IV daily for total of 2 wks from 8/14 to 8/28/23. Pt. Reports that she completed this and her midline was removed.         Overview/Hospital Course:  No notes on file    Interval History: Patient awake, oriented x 3.No new complaints today. Refusing wound care for sacral wounds. Continue lactulose and rifaximin. Pending placement to SNF     Review of Systems  Objective:     Vital Signs (Most Recent):  Temp: 98.4 °F (36.9 °C) (09/05/23 1117)  Pulse: 91 (09/05/23 1117)  Resp: 18 (09/05/23 1117)  BP: (!) 115/59 (09/05/23 1117)  SpO2: 95 % (09/05/23 1117) Vital Signs (24h Range):  Temp:  [98 °F (36.7 °C)-99.4 °F (37.4 °C)] 98.4 °F (36.9 °C)  Pulse:  [79-92] 91  Resp:  [16-18] 18  SpO2:  [91 %-96 %] 95 %  BP: (115-127)/(56-65) 115/59     Weight: 56.6 kg (124 lb 12.5 oz)  Body mass index is 22.46 kg/m².    Intake/Output " Summary (Last 24 hours) at 9/5/2023 1347  Last data filed at 9/5/2023 1341  Gross per 24 hour   Intake 360 ml   Output 2400 ml   Net -2040 ml           Physical Exam  Vitals reviewed.   Constitutional:       General: She is not in acute distress.     Appearance: She is well-developed.   HENT:      Head: Normocephalic and atraumatic.   Eyes:      General: Scleral icterus present.      Extraocular Movements: Extraocular movements intact.      Pupils: Pupils are equal, round, and reactive to light.   Neck:      Vascular: No JVD.      Trachea: No tracheal deviation.   Cardiovascular:      Rate and Rhythm: Normal rate and regular rhythm.      Heart sounds: No murmur heard.     No friction rub. No gallop.   Pulmonary:      Effort: No respiratory distress.      Breath sounds: Normal breath sounds. No wheezing or rales.   Abdominal:      General: Bowel sounds are normal. There is distension.      Palpations: Abdomen is soft. There is no mass.      Tenderness: There is no abdominal tenderness.   Musculoskeletal:         General: No deformity.      Cervical back: Neck supple.      Right lower leg: Edema present.      Left lower leg: Edema present.   Lymphadenopathy:      Cervical: No cervical adenopathy.   Skin:     General: Skin is warm and dry.      Findings: No rash.   Neurological:      Mental Status: She is alert and oriented to person, place, and time.                 Assessment/Plan:      * Hepatic encephalopathy  -Pt. With worsening confusion, reported to be constipated, likely not taking adequate dose of lactulose  -Ammonia elevated to 79 on presentation   - Continue lactulose and ammonia.   -Infectious work up neg so far     Acute cystitis  U/A with >100 WBC, many bacteria. UTI suspected to be source of recent bactermia, will treat with ceftriaxone, f/u urine and blood cultures      Chronic deep vein thrombosis (DVT) of right lower extremity  --bilateral lower extremity venous ultrasound on 08/15/2023= Chronic DVT  of the right common femoral and saphenofemoral junction. Bilateral inguinal ascites  -IVC filter in place (patient had IVC filter placed on recent hospitalization )  -patient not a candidate for anticoagulation due to recurrent GI bleeding and severe thrombocytopenia          Alcoholic cirrhosis  MELD 3.0: 21 at 8/31/2023  5:08 PM  MELD-Na: 17 at 8/31/2023  5:08 PM  Calculated from:  Serum Creatinine: 0.5 mg/dL (Using min of 1 mg/dL) at 8/31/2023  5:08 PM  Serum Sodium: 142 mmol/L (Using max of 137 mmol/L) at 8/31/2023  5:08 PM  Total Bilirubin: 3.0 mg/dL at 8/31/2023  5:08 PM  Serum Albumin: 1.7 g/dL at 8/31/2023  5:08 PM  INR(ratio): 1.8 at 8/31/2023  5:08 PM  Age at listing (hypothetical): 58 years  Sex: Female at 8/31/2023  5:08 PM          Hypokalemia  -K+ 2.5, replace PRN along with Mg and f/u repeat levels. Monitor on telemetry      Severe protein-calorie malnutrition  Nutrition consulted. Most recent weight and BMI monitored-     Measurements:  Wt Readings from Last 1 Encounters:   09/02/23 56.6 kg (124 lb 12.5 oz)   Body mass index is 22.46 kg/m².    Patient has been screened and assessed by RD.    Malnutrition Type:  Context: social/environmental circumstances  Level: severe    Malnutrition Characteristic Summary:  Weight Loss (Malnutrition): greater than 7.5% in 3 months  Subcutaneous Fat (Malnutrition): severe depletion  Muscle Mass (Malnutrition): severe depletion    Interventions/Recommendations (treatment strategy):  1. Liberalize diet to Low Na & add Boost Plus ONS. 2. RD to monitor & follow-up.    History of seizure  -Continue home keppra      Thrombocytopenia  -Plts 63, 2/2 chronic liver disease, no acute issues. Monitor for signs of bleeding      VTE Risk Mitigation (From admission, onward)         Ordered     IP VTE HIGH RISK PATIENT  Once         08/31/23 2324     Place sequential compression device  Until discontinued         08/31/23 2324                Discharge Planning   BRAYAN: 9/6/2023      Code Status: Full Code   Is the patient medically ready for discharge?: Yes    Reason for patient still in hospital (select all that apply): Pending disposition  Discharge Plan A: Skilled Nursing Facility                  Pancho Harris MD  Department of Hospital Medicine   [facility] - Telemetry Stepdown

## 2023-09-05 NOTE — NURSING
Med team L aware pt refused Rehab therapy and to be bath and placement of IV access. Patient being verbally abusive to staff, and behavior is not easily redirected.

## 2023-09-05 NOTE — PLAN OF CARE
Problem: Physical Therapy  Goal: Physical Therapy Goal  Description: Goals to be met by: 2023     Patient will increase functional independence with mobility by performin. Supine to sit with Contact Guard Assistance  2. Sit to supine with Contact Guard Assistance  3. Sit to stand transfer with Contact Guard Assistance  4. Bed to chair transfer with Contact Guard Assistance using Rolling Walker  5. Gait  x 10 feet with Contact Guard Assistance using Rolling Walker.   6. Lower extremity exercise program x15 reps per handout, with supervision    Outcome: Ongoing, Progressing

## 2023-09-05 NOTE — PLAN OF CARE
"   NURSING HOME ORDERS    09/05/2023  [facility]  [facility] - TELEMETRY STEPDOWN  [address]  [facility] [address]  Dept: [phone]  Loc: [phone]     Admit to Nursing Home:  Skilled Nursing Facility    Diagnoses:  Active Hospital Problems    Diagnosis  POA    *Hepatic encephalopathy [K76.82]  Yes    Acute cystitis [N30.00]  Unknown    Chronic deep vein thrombosis (DVT) of right lower extremity [I82.501]  Yes    Hypokalemia [E87.6]  Yes    Alcoholic cirrhosis [K70.30]  Yes     Last Assessment & Plan:   Formatting of this note might be different from the original.  This is reportedly due to tylenol OD and alcohol abuse per her sister. She has severe disease with elevated INR and MELD score of 25. She is at her baseline per her PCP. She was continued on rifaximin and encouraged to take lactulose. She was started on propranolol temporary for significant tachycardia. She is reportedly followed by Dr. Colt PATTEN, at [facility].  Formatting of this note might be different from the original.  Last Assessment & Plan:   Formatting of this note might be different from the original.  This is reportedly due to tylenol OD and alcohol abuse per her sister. She has severe disease with elevated INR and MELD score of 25. She is at her baseline per her PCP. She was continued on rifaximin and encouraged to take lactulose. She was started on propranolol temporary for significant tachycardia. She is reportedly followed by Dr. Colt PATTEN, at [facility].      Severe protein-calorie malnutrition [E43]  Yes    History of seizure [Z87.898]  Yes     One seizure 2013- "low blood sugar from a starvation diet"      Thrombocytopenia [D69.6]  Yes      Resolved Hospital Problems   No resolved problems to display.       Patient is homebound due to:  Hepatic encephalopathy    Allergies:  Review of patient's allergies indicates:   Allergen Reactions    Sulfa (sulfonamide antibiotics) Rash    " Codeine Nausea And Vomiting       Vitals:  Routine    Diet: 2 gram sodium diet    Activities:   Activity as tolerated    Goals of Care Treatment Preferences:  Code Status: Full Code          Nursing Precautions:  Fall and Pressure ulcer prevention    Consults:   PT to evaluate and treat- 5 times a week and OT to evaluate and treat- 5 times a week     Wound care: Routine wound care of sacral wounds      Medications: Discontinue all previous medication orders, if any. See new list below.     Medication List        CHANGE how you take these medications      ALPRAZolam 0.25 MG tablet  Commonly known as: XANAX  Take 1 tablet (0.25 mg total) by mouth 2 (two) times daily as needed for Anxiety.  What changed:   when to take this  reasons to take this     lactulose 20 gram/30 mL Soln  Commonly known as: CHRONULAC  Take 45 mLs (30 g total) by mouth 3 (three) times daily. TITRATE TO 3-4 BOWEL MOVEMENTS DAILY.  What changed: additional instructions            CONTINUE taking these medications      ferrous sulfate 325 mg (65 mg iron) Tab tablet  Commonly known as: FEOSOL  Take 325 mg by mouth once daily.     furosemide 20 MG tablet  Commonly known as: LASIX  Take 20 mg by mouth once daily.     levetiracetam 500 mg/5 mL (5 mL) Soln  Take 10 mLs (1,000 mg total) by mouth 2 (two) times daily.     lithium 300 MG CR tablet  Commonly known as: LITHOBID  Take 1 tablet (300 mg total) by mouth every evening.     OLANZapine 5 MG tablet  Commonly known as: ZyPREXA  Take 1 tablet (5 mg total) by mouth every evening.     pantoprazole 40 mg suspension  Commonly known as: PROTONIX  Take 1 packet (40 mg total) by mouth 2 (two) times daily.     rifAXIMin 550 mg Tab  Commonly known as: XIFAXAN  Take 1 tablet (550 mg total) by mouth 2 (two) times daily.                Immunizations Administered as of 9/5/2023       Name Date Dose VIS Date Route Exp Date    COVID-19, MRNA, LN-S, PF (Moderna) 1/26/2022 0.5 mL -- Intramuscular --    Site: Right arm      Lot: [id number]     COVID-19, MRNA, LN-S, PF (Moderna) 5/6/2021 11:23 AM 0.5 mL 12/19/2020 Intramuscular 10/2/2021    Site: Right deltoid     Given By: Radha Chan PharmD     : Modern[employer]     Lot: [id number]     COVID-19, MRNA, LN-S, PF (Moderna) 4/8/2021 11:16 AM 0.5 mL 12/19/2020 Intramuscular 9/17/2021    Site: Right deltoid     Given By: Beatris Friend     : Modern[employer]     Lot: [id number]                 Some patients may experience side effects after vaccination.  These may include fever, headache, muscle or joint aches.  Most symptoms resolve with 24-48 hours and do not require urgent medical evaluation unless they persist for more than 72 hours or symptoms are concerning for an unrelated medical condition.          _________________________________  Pancho Harris MD  09/05/2023

## 2023-09-05 NOTE — SUBJECTIVE & OBJECTIVE
Interval History: Patient awake, oriented x 3.No new complaints today. Refusing wound care for sacral wounds. Continue lactulose and rifaximin. Pending placement to [facility]     Review of Systems  Objective:     Vital Signs (Most Recent):  Temp: 98.4 °F (36.9 °C) (09/05/23 1117)  Pulse: 91 (09/05/23 1117)  Resp: 18 (09/05/23 1117)  BP: (!) 115/59 (09/05/23 1117)  SpO2: 95 % (09/05/23 1117) Vital Signs (24h Range):  Temp:  [98 °F (36.7 °C)-99.4 °F (37.4 °C)] 98.4 °F (36.9 °C)  Pulse:  [79-92] 91  Resp:  [16-18] 18  SpO2:  [91 %-96 %] 95 %  BP: (115-127)/(56-65) 115/59     Weight: 56.6 kg (124 lb 12.5 oz)  Body mass index is 22.46 kg/m².    Intake/Output Summary (Last 24 hours) at 9/5/2023 1347  Last data filed at 9/5/2023 1341  Gross per 24 hour   Intake 360 ml   Output 2400 ml   Net -2040 ml           Physical Exam  Vitals reviewed.   Constitutional:       General: She is not in acute distress.     Appearance: She is well-developed.   HENT:      Head: Normocephalic and atraumatic.   Eyes:      General: Scleral icterus present.      Extraocular Movements: Extraocular movements intact.      Pupils: Pupils are equal, round, and reactive to light.   Neck:      Vascular: No JVD.      Trachea: No tracheal deviation.   Cardiovascular:      Rate and Rhythm: Normal rate and regular rhythm.      Heart sounds: No murmur heard.     No friction rub. No gallop.   Pulmonary:      Effort: No respiratory distress.      Breath sounds: Normal breath sounds. No wheezing or rales.   Abdominal:      General: Bowel sounds are normal. There is distension.      Palpations: Abdomen is soft. There is no mass.      Tenderness: There is no abdominal tenderness.   Musculoskeletal:         General: No deformity.      Cervical back: Neck supple.      Right lower leg: Edema present.      Left lower leg: Edema present.   Lymphadenopathy:      Cervical: No cervical adenopathy.   Skin:     General: Skin is warm and dry.      Findings: No rash.    Neurological:      Mental Status: She is alert and oriented to person, place, and time.

## 2023-09-05 NOTE — PT/OT/SLP EVAL
Physical Therapy Evaluation    Patient Name:  Marely Hamilton   MRN:  [id number]    Recommendations:     Discharge Recommendations: nursing facility, skilled   Discharge Equipment Recommendations: to be determined by next level of care   Barriers to discharge: None    Assessment:     Marely Hamilton is a 58 y.o. female admitted with a medical diagnosis of Hepatic encephalopathy.  She presents with the following impairments/functional limitations: weakness, impaired endurance, impaired self care skills, impaired functional mobility, gait instability, impaired balance, impaired cognition, decreased coordination, decreased upper extremity function, decreased lower extremity function, decreased safety awareness, edema, pain, impaired skin Pt. cooperative and tolerated treatment fairly well, but only agreeable to sitting EOB at this time.    Rehab Prognosis: Fair; patient would benefit from acute skilled PT services to address these deficits and reach maximum level of function.    Recent Surgery: * No surgery found *      Plan:     During this hospitalization, patient to be seen 3 x/week to address the identified rehab impairments via gait training, therapeutic activities, therapeutic exercises and progress toward the following goals:    Plan of Care Expires:  10/05/23    Subjective     Chief Complaint: buttock pain  Patient/Family Comments/goals: pt. Agreeable to PT with encouragement  Pain/Comfort:  Pain Rating 1:  (pt. did not rate)    Patients cultural, spiritual, Mandaeism conflicts given the current situation: no    Living Environment:  Pt. Admitted from SNF.  Prior to admission, patients level of function was indep. prior to SNF admission.  Equipment used at home: walker, rolling, shower chair.  Upon discharge, patient will need to have assistance.    Objective:     Communicated with nursing prior to session.  Patient found supine with baugh catheter, peripheral IV  upon PT entry to room.    General Precautions:  Standard, fall  Orthopedic Precautions:N/A   Braces: N/A  Respiratory Status: Room air    Exams:  RLE ROM: WFL  RLE Strength: unable to accurately test  LLE ROM: WFL  LLE Strength: unable to accurately test    Functional Mobility:  Bed Mobility:     Rolling Right: moderate assistance  Scooting: moderate assistance  Supine to Sit: moderate assistance  Sit to Supine: minimum assistance  Balance: fair sitting      AM-PAC 6 CLICK MOBILITY  Total Score:9       Treatment & Education:  Discussed therapy needs, goals, and POC. Pt. Performed sitting balance/tolerance along EOB ~4 min. before laying down due to buttock pain    Patient left right sidelying with all lines intact and call button in reach.    GOALS:   Multidisciplinary Problems       Physical Therapy Goals          Problem: Physical Therapy    Goal Priority Disciplines Outcome Goal Variances Interventions   Physical Therapy Goal     PT, PT/OT Ongoing, Progressing     Description: Goals to be met by: 2023     Patient will increase functional independence with mobility by performin. Supine to sit with Contact Guard Assistance  2. Sit to supine with Contact Guard Assistance  3. Sit to stand transfer with Contact Guard Assistance  4. Bed to chair transfer with Contact Guard Assistance using Rolling Walker  5. Gait  x 10 feet with Contact Guard Assistance using Rolling Walker.   6. Lower extremity exercise program x15 reps per handout, with supervision                         History:     Past Medical History:   Diagnosis Date    Addiction to drug     Alcohol abuse     Alcohol abuse, in remission 6/15/2015    14.5 weeks ago; AA weekly    Anemia     Anxiety 6/15/2015    Behavioral problem     Bipolar disorder     Bipolar disorder in remission 6/15/2015    Cirrhosis, Laennec's 6/15/2015    Depression     Encounter for blood transfusion     Epistaxis 6/15/2015    Fatigue     Glaucoma     Hematuria     Hepatic encephalopathy 6/15/2015    Hepatic enlargement      History of psychiatric care     History of psychiatric hospitalization     History of seizure 6/15/2015    1    hx of intentional Tylenol overdose 6/15/2015    2005- situational and hx of bipolar    Hx of psychiatric care     Macrocytic anemia 9/18/2015    6 units PRBC since June 2015    Jeana     Osteoarthritis     Other ascites 6/15/2015    Psychiatric exam requested by authority     Psychiatric problem     Psychosis 9/26/2019    Renal disorder     Seizures     Self-harming behavior     Suicide attempt     Therapy     Thrombocytopenia 6/15/2015       Past Surgical History:   Procedure Laterality Date    COSMETIC SURGERY      EMBOLIZATION N/A 6/11/2023    Procedure: EMBOLIZATION, BLOOD VESSEL;  Surgeon: Debbie Surgeon;  Location: [facility];  Service: Anesthesiology;  Laterality: N/A;    ESOPHAGOGASTRODUODENOSCOPY      ESOPHAGOGASTRODUODENOSCOPY N/A 7/6/2023    Procedure: EGD (ESOPHAGOGASTRODUODENOSCOPY);  Surgeon: Bernice Guillen MD;  Location: [facility] ([address] [place] [address]);  Service: Endoscopy;  Laterality: N/A;    ESOPHAGOGASTRODUODENOSCOPY N/A 7/11/2023    Procedure: EGD (ESOPHAGOGASTRODUODENOSCOPY);  Surgeon: Rangel Broussard MD;  Location: [facility] ([address] [place] [address]);  Service: Endoscopy;  Laterality: N/A;       Time Tracking:     PT Received On: 09/05/23  PT Start Time: 1356     PT Stop Time: 1408  PT Total Time (min): 12 min     Billable Minutes: Evaluation 12      09/05/2023

## 2023-09-05 NOTE — PLAN OF CARE
Problem: Adult Inpatient Plan of Care  Goal: Plan of Care Review  Outcome: Ongoing, Progressing  Goal: Patient-Specific Goal (Individualized)  Outcome: Ongoing, Progressing  Goal: Absence of Hospital-Acquired Illness or Injury  Outcome: Ongoing, Progressing  Intervention: Identify and Manage Fall Risk  Flowsheets (Taken 9/5/2023 1929)  Safety Promotion/Fall Prevention:   assistive device/personal item within reach   bed alarm set   Fall Risk reviewed with patient/family   Fall Risk signage in place   side rails raised x 3   instructed to call staff for mobility  Goal: Optimal Comfort and Wellbeing  Outcome: Ongoing, Progressing  Goal: Readiness for Transition of Care  Outcome: Ongoing, Progressing     Problem: Nutrition Impaired (Sepsis/Septic Shock)  Goal: Optimal Nutrition Intake  Outcome: Ongoing, Progressing     Problem: Skin Injury Risk Increased  Goal: Skin Health and Integrity  Outcome: Ongoing, Progressing     POC reviewed. Address questions and concerns. AAOX2. VVS. Patient was verbal abusive to staff.  Refused staff apply barrier cream or baugh care and she throw tray on floor. Frequent safety checks performed. Bed in lowest position.  Call light in reach.   soft

## 2023-09-05 NOTE — PLAN OF CARE
09/05/23 0914   Post-Acute Status   Post-Acute Authorization Placement   Post-Acute Placement Status Referrals Sent     Referral sent to [facility] via [facility].    Jyoti Jones RN  Ext [phone]

## 2023-09-06 LAB
BACTERIA BLD CULT: NORMAL
BACTERIA BLD CULT: NORMAL

## 2023-09-06 PROCEDURE — 20600001 HC STEP DOWN PRIVATE ROOM

## 2023-09-06 PROCEDURE — 25000003 PHARM REV CODE 250: Performed by: HOSPITALIST

## 2023-09-06 PROCEDURE — 25000003 PHARM REV CODE 250: Performed by: STUDENT IN AN ORGANIZED HEALTH CARE EDUCATION/TRAINING PROGRAM

## 2023-09-06 PROCEDURE — 99232 SBSQ HOSP IP/OBS MODERATE 35: CPT | Mod: ,,, | Performed by: STUDENT IN AN ORGANIZED HEALTH CARE EDUCATION/TRAINING PROGRAM

## 2023-09-06 PROCEDURE — 99232 PR SUBSEQUENT HOSPITAL CARE,LEVL II: ICD-10-PCS | Mod: ,,, | Performed by: STUDENT IN AN ORGANIZED HEALTH CARE EDUCATION/TRAINING PROGRAM

## 2023-09-06 RX ORDER — POTASSIUM CHLORIDE 20 MEQ/1
40 TABLET, EXTENDED RELEASE ORAL ONCE
Status: COMPLETED | OUTPATIENT
Start: 2023-09-06 | End: 2023-09-06

## 2023-09-06 RX ADMIN — LACTULOSE 30 G: 20 SOLUTION ORAL at 09:09

## 2023-09-06 RX ADMIN — FUROSEMIDE 20 MG: 20 TABLET ORAL at 09:09

## 2023-09-06 RX ADMIN — LITHIUM CARBONATE 300 MG: 300 TABLET, FILM COATED, EXTENDED RELEASE ORAL at 09:09

## 2023-09-06 RX ADMIN — PANTOPRAZOLE SODIUM 40 MG: 40 GRANULE, DELAYED RELEASE ORAL at 09:09

## 2023-09-06 RX ADMIN — OLANZAPINE 5 MG: 5 TABLET, FILM COATED ORAL at 09:09

## 2023-09-06 RX ADMIN — LEVETIRACETAM 1000 MG: 100 SOLUTION ORAL at 09:09

## 2023-09-06 RX ADMIN — RIFAXIMIN 550 MG: 550 TABLET ORAL at 09:09

## 2023-09-06 RX ADMIN — POTASSIUM CHLORIDE 40 MEQ: 1500 TABLET, EXTENDED RELEASE ORAL at 09:09

## 2023-09-06 NOTE — PLAN OF CARE
Message sent to [facility] via [employer] stating this CM has submitted for [employer] authorization for SNF services. Will continue to follow.    Jyoti Jones RN  Ext [phone]

## 2023-09-06 NOTE — PLAN OF CARE
Problem: Adult Inpatient Plan of Care  Goal: Plan of Care Review  Outcome: Ongoing, Progressing  Goal: Patient-Specific Goal (Individualized)  Outcome: Ongoing, Progressing  Goal: Absence of Hospital-Acquired Illness or Injury  Outcome: Ongoing, Progressing     Problem: Infection  Goal: Absence of Infection Signs and Symptoms  Outcome: Ongoing, Progressing     Problem: Adjustment to Illness (Sepsis/Septic Shock)  Goal: Optimal Coping  Outcome: Ongoing, Progressing     Problem: Bleeding (Sepsis/Septic Shock)  Goal: Absence of Bleeding  Outcome: Ongoing, Progressing     Problem: Glycemic Control Impaired (Sepsis/Septic Shock)  Goal: Blood Glucose Level Within Desired Range  Outcome: Ongoing, Progressing     Problem: Infection Progression (Sepsis/Septic Shock)  Goal: Absence of Infection Signs and Symptoms  Outcome: Ongoing, Progressing     Problem: Nutrition Impaired (Sepsis/Septic Shock)  Goal: Optimal Nutrition Intake  Outcome: Ongoing, Progressing     Problem: Fluid Imbalance (Pneumonia)  Goal: Fluid Balance  Outcome: Ongoing, Progressing     Problem: Infection (Pneumonia)  Goal: Resolution of Infection Signs and Symptoms  Outcome: Ongoing, Progressing     Problem: Respiratory Compromise (Pneumonia)  Goal: Effective Oxygenation and Ventilation  Outcome: Ongoing, Progressing     Problem: Impaired Wound Healing  Goal: Optimal Wound Healing  Outcome: Ongoing, Progressing     Problem: Skin Injury Risk Increased  Goal: Skin Health and Integrity  Outcome: Ongoing, Progressing      Pt has refused vitals twice in shift. Charge nurse informed. Md informed as well. Educated on importance of vitals. Pt continues to yell at RN and PCT. Monitoring.

## 2023-09-06 NOTE — SUBJECTIVE & OBJECTIVE
Interval History: Patient awake, oriented x 3.No new complaints today. Refusing wound care for sacral wounds. Continue lactulose and rifaximin. Awaiting  placement to [facility]     Review of Systems  Objective:     Vital Signs (Most Recent):  Temp: 98.4 °F (36.9 °C) (09/06/23 0756)  Pulse: 99 (09/06/23 0756)  Resp: 20 (09/06/23 0756)  BP: (!) 158/71 (09/06/23 0756)  SpO2: (!) 90 % (09/06/23 0756) Vital Signs (24h Range):  Temp:  [98.4 °F (36.9 °C)-99.2 °F (37.3 °C)] 98.4 °F (36.9 °C)  Pulse:  [95-99] 99  Resp:  [17-20] 20  SpO2:  [90 %-96 %] 90 %  BP: (135-158)/(62-95) 158/71     Weight: 56.6 kg (124 lb 12.5 oz)  Body mass index is 22.46 kg/m².    Intake/Output Summary (Last 24 hours) at 9/6/2023 1153  Last data filed at 9/6/2023 0943  Gross per 24 hour   Intake 480 ml   Output 2250 ml   Net -1770 ml           Physical Exam  Vitals reviewed.   Constitutional:       General: She is not in acute distress.     Appearance: She is well-developed.   HENT:      Head: Normocephalic and atraumatic.   Eyes:      General: Scleral icterus present.      Extraocular Movements: Extraocular movements intact.      Pupils: Pupils are equal, round, and reactive to light.   Neck:      Vascular: No JVD.      Trachea: No tracheal deviation.   Cardiovascular:      Rate and Rhythm: Normal rate and regular rhythm.      Heart sounds: No murmur heard.     No friction rub. No gallop.   Pulmonary:      Effort: No respiratory distress.      Breath sounds: Normal breath sounds. No wheezing or rales.   Abdominal:      General: Bowel sounds are normal. There is distension.      Palpations: Abdomen is soft. There is no mass.      Tenderness: There is no abdominal tenderness.   Musculoskeletal:         General: No deformity.      Cervical back: Neck supple.      Right lower leg: Edema present.      Left lower leg: Edema present.   Lymphadenopathy:      Cervical: No cervical adenopathy.   Skin:     General: Skin is warm and dry.      Findings: No rash.    Neurological:      Mental Status: She is alert and oriented to person, place, and time.

## 2023-09-06 NOTE — PROGRESS NOTES
"[facility] - Telemetry [facility] Medicine  Progress Note    Patient Name: Marely Hamilton  MRN: [id number]  Patient Class: IP- Inpatient   Admission Date: 8/31/2023  Length of Stay: 5 days  Attending Physician: Pancho Harris MD  Primary Care Provider: Viktor Ross MD        Subjective:     Principal Problem:Hepatic encephalopathy        HPI:  59 yo F with PMHx alcoholic cirrhosis, recurrent GI bleeds, and chronic LE DVT s/p IVC filter placement who presents to the ED from [facility] for reported "shortness of breath, high ammonia levels and constipation x a few days". Pt. Mildly confused at time of my interview, so chronology of events is limited, but the patietn reports that she has had worsening swelling in her feet and abdomen as well as some SOB. She believes the fluid in her abdomen and legs is causing her to feel weak and SOB. She also has had constipation despite being on lactulose for HE. She denies any fevers, chills, nausea, or vomiting. Of note, pt. Recently admitted to [facility] for E.coli bacteremia thought to be related to a UTI. ID recommended midline placement for ceftriaxone 2gm IV daily for total of 2 wks from 8/14 to 8/28/23. Pt. Reports that she completed this and her midline was removed.         Overview/Hospital Course:  59 yo F with PMHx alcoholic cirrhosis, recurrent GI bleeds, and chronic LE DVT s/p IVC filter placement who presents to the ED from [facility] for confusion, admitted for HE. Mentation back to baseline with lactulose. Patient came from [facility] care facility. Now awaiting placement in SNF        Physical Exam  Vitals reviewed.   Constitutional:       General: She is not in acute distress.     Appearance: She is well-developed.   HENT:      Head: Normocephalic and atraumatic.   Eyes:      General: Scleral icterus present.      Extraocular Movements: Extraocular movements intact.      Pupils: Pupils are equal, round, and reactive to light.   Neck:      " Vascular: No JVD.      Trachea: No tracheal deviation.   Cardiovascular:      Rate and Rhythm: Normal rate and regular rhythm.      Heart sounds: No murmur heard.     No friction rub. No gallop.   Pulmonary:      Effort: No respiratory distress.      Breath sounds: Normal breath sounds. No wheezing or rales.   Abdominal:      General: Bowel sounds are normal. There is distension.      Palpations: Abdomen is soft. There is no mass.      Tenderness: There is no abdominal tenderness.   Musculoskeletal:         General: No deformity.      Cervical back: Neck supple.      Right lower leg: Edema present.      Left lower leg: Edema present.   Lymphadenopathy:      Cervical: No cervical adenopathy.   Skin:     General: Skin is warm and dry.      Findings: No rash.   Neurological:      Mental Status: She is alert and oriented to person, place, and time.          Assessment/Plan:      * Hepatic encephalopathy  -Pt. With worsening confusion, reported to be constipated, likely not taking adequate dose of lactulose  -Ammonia elevated to 79 on presentation   - Continue lactulose and ammonia.   -Infectious work up neg so far     Chronic deep vein thrombosis (DVT) of right lower extremity  --bilateral lower extremity venous ultrasound on 08/15/2023= Chronic DVT of the right common femoral and saphenofemoral junction. Bilateral inguinal ascites  -IVC filter in place (patient had IVC filter placed on recent hospitalization )  -patient not a candidate for anticoagulation due to recurrent GI bleeding and severe thrombocytopenia       Alcoholic cirrhosis  MELD 3.0: 21 at 8/31/2023  5:08 PM  MELD-Na: 17 at 8/31/2023  5:08 PM  Calculated from:  Serum Creatinine: 0.5 mg/dL (Using min of 1 mg/dL) at 8/31/2023  5:08 PM  Serum Sodium: 142 mmol/L (Using max of 137 mmol/L) at 8/31/2023  5:08 PM  Total Bilirubin: 3.0 mg/dL at 8/31/2023  5:08 PM  Serum Albumin: 1.7 g/dL at 8/31/2023  5:08 PM  INR(ratio): 1.8 at 8/31/2023  5:08 PM  Age at listing  (hypothetical): 58 years  Sex: Female at 8/31/2023  5:08 P      Severe protein-calorie malnutrition  Nutrition consulted. Most recent weight and BMI monitored-     Measurements:  Wt Readings from Last 1 Encounters:   09/02/23 56.6 kg (124 lb 12.5 oz)   Body mass index is 22.46 kg/m².    Patient has been screened and assessed by RD.    Malnutrition Type:  Context: social/environmental circumstances  Level: severe    Malnutrition Characteristic Summary:  Weight Loss (Malnutrition): greater than 7.5% in 3 months  Subcutaneous Fat (Malnutrition): severe depletion  Muscle Mass (Malnutrition): severe depletion    Interventions/Recommendations (treatment strategy):  1. Liberalize diet to Low Na & add Boost Plus ONS. 2. RD to monitor & follow-up.    History of seizure  -Continue home keppra      Thrombocytopenia  -Plts 63, 2/2 chronic liver disease, no acute issues. Monitor for signs of bleeding      VTE Risk Mitigation (From admission, onward)           Ordered     IP VTE HIGH RISK PATIENT  Once         08/31/23 2324     Place sequential compression device  Until discontinued         08/31/23 2324                    Discharge Planning   BRAYAN: 9/6/2023     Code Status: Full Code   Is the patient medically ready for discharge?: Yes    Reason for patient still in hospital (select all that apply): Patient trending condition  Discharge Plan A: Skilled Nursing Facility                  Pancho Harris MD  Department of Hospital Medicine   [facility] - Telemetry Stepdown

## 2023-09-06 NOTE — PT/OT/SLP PROGRESS
"Occupational Therapy      Patient Name:  Marely Hamilton   MRN:  [id number]    OT attempted  x2 this date. At 1215 & 1330.  Patient not seen today secondary to Pt increasingly agitated upon Pm return with  Pt yelling "Don't touch me" repeatedly  . Will follow-up as appropriate.    9/6/2023  Marce Snell OTR/L    "

## 2023-09-06 NOTE — HOSPITAL COURSE
59 yo F with PMHx alcoholic cirrhosis, recurrent GI bleeds, and chronic LE DVT s/p IVC filter placement who presents to the ED from [facility] for confusion, admitted for HE. Mentation back to baseline with lactulose. Patient came from long-term care facility. Now awaiting placement in SNF

## 2023-09-06 NOTE — PLAN OF CARE
Problem: Adult Inpatient Plan of Care  Goal: Plan of Care Review  Outcome: Ongoing, Progressing  Goal: Patient-Specific Goal (Individualized)  Outcome: Ongoing, Progressing  Goal: Absence of Hospital-Acquired Illness or Injury  Outcome: Ongoing, Progressing  Goal: Optimal Comfort and Wellbeing  Outcome: Ongoing, Progressing  Goal: Readiness for Transition of Care  Outcome: Ongoing, Progressing     Problem: Infection  Goal: Absence of Infection Signs and Symptoms  Outcome: Ongoing, Progressing     Problem: Adjustment to Illness (Sepsis/Septic Shock)  Goal: Optimal Coping  Outcome: Ongoing, Progressing     Problem: Bleeding (Sepsis/Septic Shock)  Goal: Absence of Bleeding  Outcome: Ongoing, Progressing     Problem: Nutrition Impaired (Sepsis/Septic Shock)  Goal: Optimal Nutrition Intake  Outcome: Ongoing, Progressing     Problem: Impaired Wound Healing  Goal: Optimal Wound Healing  Outcome: Ongoing, Progressing     Problem: Skin Injury Risk Increased  Goal: Skin Health and Integrity  Outcome: Ongoing, Progressing  Intervention: Optimize Skin Protection  Flowsheets (Taken 9/6/2023 1829)  Pressure Reduction Techniques:   frequent weight shift encouraged   positioned off wounds  Skin Protection:   adhesive use limited   incontinence pads utilized   protective footwear used   skin sealant/moisture barrier applied   tubing/devices free from skin contact     POC reviewed. AAOX2. VVS. Pt was more cooperative this shift and tolerate meals well. No s/s of distress or discomfort. Call light in reach. Bed in lowest position.

## 2023-09-07 VITALS
HEART RATE: 92 BPM | BODY MASS INDEX: 22.11 KG/M2 | WEIGHT: 124.75 LBS | RESPIRATION RATE: 18 BRPM | SYSTOLIC BLOOD PRESSURE: 138 MMHG | DIASTOLIC BLOOD PRESSURE: 68 MMHG | OXYGEN SATURATION: 93 % | TEMPERATURE: 99 F | HEIGHT: 63 IN

## 2023-09-07 PROCEDURE — 25000003 PHARM REV CODE 250: Performed by: HOSPITALIST

## 2023-09-07 PROCEDURE — 97110 THERAPEUTIC EXERCISES: CPT

## 2023-09-07 PROCEDURE — 99233 PR SUBSEQUENT HOSPITAL CARE,LEVL III: ICD-10-PCS | Mod: ,,, | Performed by: HOSPITALIST

## 2023-09-07 PROCEDURE — 99233 SBSQ HOSP IP/OBS HIGH 50: CPT | Mod: ,,, | Performed by: HOSPITALIST

## 2023-09-07 RX ADMIN — RIFAXIMIN 550 MG: 550 TABLET ORAL at 08:09

## 2023-09-07 RX ADMIN — LEVETIRACETAM 1000 MG: 100 SOLUTION ORAL at 08:09

## 2023-09-07 RX ADMIN — LACTULOSE 30 G: 20 SOLUTION ORAL at 08:09

## 2023-09-07 RX ADMIN — PANTOPRAZOLE SODIUM 40 MG: 40 GRANULE, DELAYED RELEASE ORAL at 08:09

## 2023-09-07 RX ADMIN — PANTOPRAZOLE SODIUM 40 MG: 40 GRANULE, DELAYED RELEASE ORAL at 09:09

## 2023-09-07 RX ADMIN — RIFAXIMIN 550 MG: 550 TABLET ORAL at 09:09

## 2023-09-07 RX ADMIN — FUROSEMIDE 20 MG: 20 TABLET ORAL at 08:09

## 2023-09-07 RX ADMIN — LEVETIRACETAM 1000 MG: 100 SOLUTION ORAL at 09:09

## 2023-09-07 RX ADMIN — LITHIUM CARBONATE 300 MG: 300 TABLET, FILM COATED, EXTENDED RELEASE ORAL at 09:09

## 2023-09-07 NOTE — PT/OT/SLP PROGRESS
Physical Therapy      Patient Name:  Marely Hamilton   MRN:  [id number]    Patient not seen today secondary to Other (Comment) (Discharge orders in place with  time scheduled at 2:00 pm). Will follow-up on next scheduled visit if not discharged from hospital.

## 2023-09-07 NOTE — SUBJECTIVE & OBJECTIVE
Interval History:   9/7: patient appears at her baseline mental status.     Review of Systems   Constitutional:  Negative for activity change, appetite change, chills, fever and unexpected weight change.   HENT:  Negative for congestion and sore throat.    Respiratory:  Positive for shortness of breath. Negative for cough.    Cardiovascular:  Negative for chest pain, palpitations and leg swelling.   Gastrointestinal:  Positive for abdominal distention. Negative for abdominal pain, blood in stool, constipation, diarrhea, nausea and vomiting.   Genitourinary:  Negative for difficulty urinating, dysuria and hematuria.   Musculoskeletal:  Positive for arthralgias. Negative for myalgias.   Skin:  Positive for color change. Negative for rash.   Neurological:  Negative for dizziness, tremors and seizures.     Objective:     Vital Signs (Most Recent):  Temp: 98.3 °F (36.8 °C) (09/07/23 1155)  Pulse: 74 (09/07/23 1155)  Resp: 18 (09/07/23 1155)  BP: (!) 149/69 (09/07/23 1155)  SpO2: (!) 94 % (09/07/23 1155) Vital Signs (24h Range):  Temp:  [98.1 °F (36.7 °C)-99.5 °F (37.5 °C)] 98.3 °F (36.8 °C)  Pulse:  [74-93] 74  Resp:  [16-18] 18  SpO2:  [94 %-98 %] 94 %  BP: (124-149)/(60-69) 149/69     Weight: 56.6 kg (124 lb 12.5 oz)  Body mass index is 22.46 kg/m².    Intake/Output Summary (Last 24 hours) at 9/7/2023 1419  Last data filed at 9/6/2023 1844  Gross per 24 hour   Intake --   Output 1200 ml   Net -1200 ml         Physical Exam  Vitals reviewed.   Constitutional:       General: She is not in acute distress.     Appearance: She is well-developed.   HENT:      Head: Normocephalic and atraumatic.   Eyes:      General: Scleral icterus present.      Extraocular Movements: Extraocular movements intact.      Pupils: Pupils are equal, round, and reactive to light.   Neck:      Vascular: No JVD.      Trachea: No tracheal deviation.   Cardiovascular:      Rate and Rhythm: Normal rate and regular rhythm.      Heart sounds: No murmur  heard.     No friction rub. No gallop.   Pulmonary:      Effort: No respiratory distress.      Breath sounds: Normal breath sounds. No wheezing or rales.   Abdominal:      General: Bowel sounds are normal. There is distension.      Palpations: Abdomen is soft. There is no mass.      Tenderness: There is no abdominal tenderness.   Musculoskeletal:         General: No deformity.      Cervical back: Neck supple.      Right lower leg: Edema present.      Left lower leg: Edema present.   Lymphadenopathy:      Cervical: No cervical adenopathy.   Skin:     General: Skin is warm and dry.      Findings: No rash.   Neurological:      Mental Status: She is alert and oriented to person, place, and time.             Significant Labs: All pertinent labs within the past 24 hours have been reviewed.    Significant Imaging: I have reviewed all pertinent imaging results/findings within the past 24 hours.

## 2023-09-07 NOTE — PT/OT/SLP PROGRESS
Occupational Therapy   Treatment    Name: Marely Hamilton  MRN: [id number]  Admitting Diagnosis:  Hepatic encephalopathy       Recommendations:     Discharge Recommendations: nursing facility, skilled  Discharge Equipment Recommendations:  to be determined by next level of care  Barriers to discharge:  Decreased caregiver support    Assessment:     Marely Hamilton is a 58 y.o. female with a medical diagnosis of Hepatic encephalopathy.  She presents with fair participation and motivation. Patient with improved participation on this date. Performance deficits affecting function are weakness, impaired endurance, impaired self care skills, impaired functional mobility, impaired balance, impaired cognition, decreased upper extremity function, decreased lower extremity function, decreased safety awareness, pain, edema, impaired skin.     Rehab Prognosis:  Fair; patient would benefit from acute skilled OT services to address these deficits and reach maximum level of function.       Plan:     Patient to be seen 3 x/week to address the above listed problems via self-care/home management, therapeutic activities, therapeutic exercises, cognitive retraining, neuromuscular re-education  Plan of Care Expires: 10/02/23  Plan of Care Reviewed with: patient    Subjective     Chief Complaint: none stated  Patient/Family Comments/goals: When patient asked if she wanted to brush her teeth she said she would love to.    Pain/Comfort:  Pain Rating 1: 0/10  Pain Rating Post-Intervention 1: 0/10    Objective:     Communicated with: RN prior to session.  Patient found supine with peripheral IV, baugh catheter, bed alarm (no family present) upon OT entry to room.    General Precautions: Standard, fall    Orthopedic Precautions:N/A  Braces: N/A  Respiratory Status: Room air     Occupational Performance:     Activities of Daily Living:  Grooming: set up assistance with brushing teeth and washing face while seated in bed with HOB elevated.        [facility] 6 Click ADL: 13    Treatment & Education:  Patient performed AROM with BUE in 3 planes of motion x2 sets x10 reps each while supine with HOB elevated. Patient declined EOB activities on this date. Patient with bright mood and affect, no agitation during session on this date. Pt had no further questions & when asked whether there were any concerns pt reported none.      Patient left supine with all lines intact, call button in reach, bed alarm on, and RN notified    GOALS:   Multidisciplinary Problems       Occupational Therapy Goals          Problem: Occupational Therapy    Goal Priority Disciplines Outcome Interventions   Occupational Therapy Goal     OT, PT/OT Ongoing, Progressing    Description: Goals to be met by: 9/25     Patient will increase functional independence with ADLs by performing:    UE Dressing with Stand-by Assistance.  LE Dressing with Maximum Assistance.  Grooming while seated with Set-up Assistance.  Toileting from bedside commode with Moderate Assistance for hygiene and clothing management.   Rolling to Bilateral with Stand-by Assistance.   Supine to sit with Minimal Assistance.  Stand pivot transfers with Moderate Assistance.                         Time Tracking:     OT Date of Treatment: 09/07/23  OT Start Time: 1045  OT Stop Time: 1058  OT Total Time (min): 13 min    Billable Minutes:Therapeutic Exercise 13    OT/DC: OT          9/7/2023

## 2023-09-07 NOTE — PLAN OF CARE
[facility] - Telemetry Stepdown  Discharge Final Note    Primary Care Provider: Viktor Ross MD    Expected Discharge Date: 9/7/2023      Future Appointments   Date Time Provider Department Center   10/10/2023  3:30 PM Analisa Alvarado MD [facility] HEPAT [facility]          Final Discharge Note (most recent)       Final Note - 09/07/23 1318          Final Note    Assessment Type Final Discharge Note     Anticipated Discharge Disposition Skilled Nursing Facility     What phone number can be called within the next 1-3 days to see how you are doing after discharge? [phone]     Hospital Resources/Appts/Education Provided Appointments scheduled and added to AVS        Post-Acute Status    Post-Acute Authorization Placement     Post-Acute Placement Status Set-up Complete/Auth obtained   [facility] ([facility])                    Important Message from Medicare             Contact Info       [facility]    [address] [place] [id number]   Phone: [phone]       Next Steps: Follow up            Jyoti Jones RN  Ext [phone]

## 2023-09-07 NOTE — PROGRESS NOTES
"[facility] - Telemetry [facility] Medicine  Progress Note    Patient Name: Marely Hamilton  MRN: [id number]  Patient Class: IP- Inpatient   Admission Date: 8/31/2023  Length of Stay: 6 days  Attending Physician: Lenka Ortiz MD  Primary Care Provider: Viktor Ross MD        Subjective:     Principal Problem:Hepatic encephalopathy        HPI:  59 yo F with PMHx alcoholic cirrhosis, recurrent GI bleeds, and chronic LE DVT s/p IVC filter placement who presents to the ED from [facility] for reported "shortness of breath, high ammonia levels and constipation x a few days". Pt. Mildly confused at time of my interview, so chronology of events is limited, but the patietn reports that she has had worsening swelling in her feet and abdomen as well as some SOB. She believes the fluid in her abdomen and legs is causing her to feel weak and SOB. She also has had constipation despite being on lactulose for HE. She denies any fevers, chills, nausea, or vomiting. Of note, pt. Recently admitted to [facility] for E.coli bacteremia thought to be related to a UTI. ID recommended midline placement for ceftriaxone 2gm IV daily for total of 2 wks from 8/14 to 8/28/23. Pt. Reports that she completed this and her midline was removed.         Overview/Hospital Course:  59 yo F with PMHx alcoholic cirrhosis, recurrent GI bleeds, and chronic LE DVT s/p IVC filter placement who presents to the ED from [facility] for confusion, admitted for HE. Mentation back to baseline with lactulose. Patient came from assisted care facility. Now awaiting placement in SNF       Interval History:   9/7: patient appears at her baseline mental status.     Review of Systems   Constitutional:  Negative for activity change, appetite change, chills, fever and unexpected weight change.   HENT:  Negative for congestion and sore throat.    Respiratory:  Positive for shortness of breath. Negative for cough.    Cardiovascular:  Negative for chest " pain, palpitations and leg swelling.   Gastrointestinal:  Positive for abdominal distention. Negative for abdominal pain, blood in stool, constipation, diarrhea, nausea and vomiting.   Genitourinary:  Negative for difficulty urinating, dysuria and hematuria.   Musculoskeletal:  Positive for arthralgias. Negative for myalgias.   Skin:  Positive for color change. Negative for rash.   Neurological:  Negative for dizziness, tremors and seizures.     Objective:     Vital Signs (Most Recent):  Temp: 98.3 °F (36.8 °C) (09/07/23 1155)  Pulse: 74 (09/07/23 1155)  Resp: 18 (09/07/23 1155)  BP: (!) 149/69 (09/07/23 1155)  SpO2: (!) 94 % (09/07/23 1155) Vital Signs (24h Range):  Temp:  [98.1 °F (36.7 °C)-99.5 °F (37.5 °C)] 98.3 °F (36.8 °C)  Pulse:  [74-93] 74  Resp:  [16-18] 18  SpO2:  [94 %-98 %] 94 %  BP: (124-149)/(60-69) 149/69     Weight: 56.6 kg (124 lb 12.5 oz)  Body mass index is 22.46 kg/m².    Intake/Output Summary (Last 24 hours) at 9/7/2023 1419  Last data filed at 9/6/2023 1844  Gross per 24 hour   Intake --   Output 1200 ml   Net -1200 ml         Physical Exam  Vitals reviewed.   Constitutional:       General: She is not in acute distress.     Appearance: She is well-developed.   HENT:      Head: Normocephalic and atraumatic.   Eyes:      General: Scleral icterus present.      Extraocular Movements: Extraocular movements intact.      Pupils: Pupils are equal, round, and reactive to light.   Neck:      Vascular: No JVD.      Trachea: No tracheal deviation.   Cardiovascular:      Rate and Rhythm: Normal rate and regular rhythm.      Heart sounds: No murmur heard.     No friction rub. No gallop.   Pulmonary:      Effort: No respiratory distress.      Breath sounds: Normal breath sounds. No wheezing or rales.   Abdominal:      General: Bowel sounds are normal. There is distension.      Palpations: Abdomen is soft. There is no mass.      Tenderness: There is no abdominal tenderness.   Musculoskeletal:         General:  No deformity.      Cervical back: Neck supple.      Right lower leg: Edema present.      Left lower leg: Edema present.   Lymphadenopathy:      Cervical: No cervical adenopathy.   Skin:     General: Skin is warm and dry.      Findings: No rash.   Neurological:      Mental Status: She is alert and oriented to person, place, and time.             Significant Labs: All pertinent labs within the past 24 hours have been reviewed.    Significant Imaging: I have reviewed all pertinent imaging results/findings within the past 24 hours.      Assessment/Plan:      * Hepatic encephalopathy  -Pt. With worsening confusion, reported to be constipated, likely not taking adequate dose of lactulose  -Ammonia elevated to 79 on presentation   - Continue lactulose and ammonia.   -Infectious work up neg so far     Acute cystitis  U/A with >100 WBC, many bacteria. UTI suspected to be source of recent bactermia, will treat with ceftriaxone, f/u urine and blood cultures      Chronic deep vein thrombosis (DVT) of right lower extremity  --bilateral lower extremity venous ultrasound on 08/15/2023= Chronic DVT of the right common femoral and saphenofemoral junction. Bilateral inguinal ascites  -IVC filter in place (patient had IVC filter placed on recent hospitalization )  -patient not a candidate for anticoagulation due to recurrent GI bleeding and severe thrombocytopenia          Alcoholic cirrhosis  MELD 3.0: 21 at 8/31/2023  5:08 PM  MELD-Na: 17 at 8/31/2023  5:08 PM  Calculated from:  Serum Creatinine: 0.5 mg/dL (Using min of 1 mg/dL) at 8/31/2023  5:08 PM  Serum Sodium: 142 mmol/L (Using max of 137 mmol/L) at 8/31/2023  5:08 PM  Total Bilirubin: 3.0 mg/dL at 8/31/2023  5:08 PM  Serum Albumin: 1.7 g/dL at 8/31/2023  5:08 PM  INR(ratio): 1.8 at 8/31/2023  5:08 PM  Age at listing (hypothetical): 58 years  Sex: Female at 8/31/2023  5:08 PM          Hypokalemia  -K+ 2.5, replace PRN along with Mg and f/u repeat levels. Monitor on  telemetry      Severe protein-calorie malnutrition  Nutrition consulted. Most recent weight and BMI monitored-     Measurements:  Wt Readings from Last 1 Encounters:   09/02/23 56.6 kg (124 lb 12.5 oz)   Body mass index is 22.46 kg/m².    Patient has been screened and assessed by RD.    Malnutrition Type:  Context: social/environmental circumstances  Level: severe    Malnutrition Characteristic Summary:  Weight Loss (Malnutrition): greater than 7.5% in 3 months  Subcutaneous Fat (Malnutrition): severe depletion  Muscle Mass (Malnutrition): severe depletion    Interventions/Recommendations (treatment strategy):  1. Liberalize diet to Low Na & add Boost Plus ONS. 2. RD to monitor & follow-up.    History of seizure  -Continue home keppra      Thrombocytopenia  -Plts 63, 2/2 chronic liver disease, no acute issues. Monitor for signs of bleeding      VTE Risk Mitigation (From admission, onward)         Ordered     IP VTE HIGH RISK PATIENT  Once         08/31/23 2324     Place sequential compression device  Until discontinued         08/31/23 2324                Discharge Planning   BRAYAN: 9/7/2023     Code Status: Full Code   Is the patient medically ready for discharge?: Yes    Reason for patient still in hospital (select all that apply): Patient trending condition  Discharge Plan A: Skilled Nursing Facility                  Lenka Ortiz MD  Department of Hospital Medicine   [facility] - Telemetry Stepdown

## 2023-09-07 NOTE — PLAN OF CARE
[employer] approved skilled services. Call placed to [facility] ([phone]) to inform them of insurance authorization. SNF orders requested and sent to [facility] via [facility]. Will continue to follow.    13:00PM  Message received from [facility] informing this CM that they are ready to accept Ms. Hamilton. She will be returning to . W/C van transportation arranged for an estmated pick-up time of 2:00PM. Nurse to call report to ([phone]).    Call placed to patient's sister Zaria ([phone]) to inform her of above conversation. She expressed understanding.    Jyoti Jones RN  Ext [phone]

## 2023-09-07 NOTE — PLAN OF CARE
Problem: Occupational Therapy  Goal: Occupational Therapy Goal  Description: Goals to be met by: 9/25     Patient will increase functional independence with ADLs by performing:    UE Dressing with Stand-by Assistance.  LE Dressing with Maximum Assistance.  Grooming while seated with Set-up Assistance.  Toileting from bedside commode with Moderate Assistance for hygiene and clothing management.   Rolling to Bilateral with Stand-by Assistance.   Supine to sit with Minimal Assistance.  Stand pivot transfers with Moderate Assistance.    Outcome: Ongoing, Progressing   Goals remain appropriate.

## 2023-09-07 NOTE — PLAN OF CARE
POC discussed with pt. Pt voiced understanding of plan, no incidents during shift. Bed in lowest position, call light in reach

## 2023-09-07 NOTE — PLAN OF CARE
"   [facility]     Department of Hospital Medicine     [address] [place] [address]     [phone] after hours  [phone] fax       NURSING HOME ORDERS    Patient Name: Marely Hamilton  YOB: [birth year]    Admit to Nursing Home:       Skilled Bed      Full code                                              Diagnoses:  Active Hospital Problems    Diagnosis  POA    *Hepatic encephalopathy [K76.82]  Yes    Acute cystitis [N30.00]  Unknown    Chronic deep vein thrombosis (DVT) of right lower extremity [I82.501]  Yes    Hypokalemia [E87.6]  Yes    Alcoholic cirrhosis [K70.30]  Yes     Last Assessment & Plan:   Formatting of this note might be different from the original.  This is reportedly due to tylenol OD and alcohol abuse per her sister. She has severe disease with elevated INR and MELD score of 25. She is at her baseline per her PCP. She was continued on rifaximin and encouraged to take lactulose. She was started on propranolol temporary for significant tachycardia. She is reportedly followed by Dr. Colt PATTEN, at [facility].  Formatting of this note might be different from the original.  Last Assessment & Plan:   Formatting of this note might be different from the original.  This is reportedly due to tylenol OD and alcohol abuse per her sister. She has severe disease with elevated INR and MELD score of 25. She is at her baseline per her PCP. She was continued on rifaximin and encouraged to take lactulose. She was started on propranolol temporary for significant tachycardia. She is reportedly followed by Dr. Colt PATTEN, at [facility].      Severe protein-calorie malnutrition [E43]  Yes    History of seizure [Z87.898]  Yes     One seizure 2013- "low blood sugar from a starvation diet"      Thrombocytopenia [D69.6]  Yes      Resolved Hospital Problems   No resolved problems to display.       Patient is homebound due to:  " Hepatic encephalopathy    Allergies:  Review of patient's allergies indicates:   Allergen Reactions    Sulfa (sulfonamide antibiotics) Rash    Codeine Nausea And Vomiting       Vitals:     Every shift (Skilled Nursing patients)    Diet: fluid restriction: 1800 and 2 gram sodium diet                  Acitivities:  - Up in a chair each morning as tolerated  - Ambulate with assistance to bathroom  - Scheduled walks once each shift (every 8 hours)  - May ambulate independently  - May use walker, cane, or self-propelled wheelchair       - Weight bearing: as tolerated    LABS:  Per facility protocol    Nursing Precautions:    - Aspiration precautions:             - Total assistance with meals            -  Upright 90 degrees befor during and after meals             -  Suction at bedside          - Fall precautions per nursing home protocol   - Seizure precaution per snf protocol   - Decubitus precautions:        -  for positioning   - Pressure reducing foam mattress   - Turn patient every two hours. Use wedge pillows to anchor patient    CONSULTS:      Physical Therapy to evaluate and treat     Occupational Therapy to evaluate and treat     Speech Therapy  to evaluate and treat     Nutrition to evaluate and recommend diet     Psychiatry to evaluate and follow patients for delirium                                             healthcare power of , long term social planninng    MISCELLANEOUS CARE:      Routine Skin for Bedridden Patients:  Apply moisture barrier cream to all    skin folds and wet areas in perineal area daily and after baths and                           all bowel movements.    Medications: Discontinue all previous medication orders, if any. See new list below.     Current Discharge Medication List        CONTINUE these medications which have CHANGED    Details   lactulose (CHRONULAC) 20 gram/30 mL Soln Take 45 mLs (30 g total) by mouth 3 (three) times daily. TITRATE TO  3-4 BOWEL MOVEMENTS DAILY.  Qty: 4050 mL, Refills: 2           CONTINUE these medications which have NOT CHANGED    Details   ferrous sulfate (FEOSOL) 325 mg (65 mg iron) Tab tablet Take 325 mg by mouth once daily.      furosemide (LASIX) 20 MG tablet Take 20 mg by mouth once daily.      levetiracetam 500 mg/5 mL (5 mL) Soln Take 10 mLs (1,000 mg total) by mouth 2 (two) times daily.  Qty: 600 mL, Refills: 11      lithium (LITHOBID) 300 MG CR tablet Take 1 tablet (300 mg total) by mouth every evening.  Qty: 30 tablet, Refills: 11      OLANZapine (ZYPREXA) 5 MG tablet Take 1 tablet (5 mg total) by mouth every evening.  Qty: 30 tablet, Refills: 11      pantoprazole (PROTONIX) 40 mg suspension Take 1 packet (40 mg total) by mouth 2 (two) times daily.  Qty: 60 packet, Refills: 11      rifAXIMin (XIFAXAN) 550 mg Tab Take 1 tablet (550 mg total) by mouth 2 (two) times daily.  Qty: 180 tablet, Refills: 3                 _________________________________  Lenka Ortiz MD  09/07/2023

## 2023-09-12 ENCOUNTER — HOSPITAL ENCOUNTER (INPATIENT)
Facility: HOSPITAL | Age: 58
LOS: 3 days | Discharge: NURSING, REHAB OR LONG-TERM CARE | DRG: 177 | End: 2023-09-15
Attending: EMERGENCY MEDICINE | Admitting: HOSPITALIST
Payer: MEDICARE

## 2023-09-12 DIAGNOSIS — U07.1 COVID-19 VIRUS INFECTION: Primary | ICD-10-CM

## 2023-09-12 DIAGNOSIS — Z51.5 PALLIATIVE CARE ENCOUNTER: ICD-10-CM

## 2023-09-12 DIAGNOSIS — R53.81 DEBILITY: ICD-10-CM

## 2023-09-12 DIAGNOSIS — Z71.89 ADVANCE CARE PLANNING: ICD-10-CM

## 2023-09-12 DIAGNOSIS — R06.02 SOB (SHORTNESS OF BREATH): ICD-10-CM

## 2023-09-12 DIAGNOSIS — G93.41 METABOLIC ENCEPHALOPATHY: ICD-10-CM

## 2023-09-12 LAB
ALBUMIN SERPL BCP-MCNC: 1.7 G/DL (ref 3.5–5.2)
ALLENS TEST: ABNORMAL
ALLENS TEST: ABNORMAL
ALP SERPL-CCNC: 143 U/L (ref 55–135)
ALT SERPL W/O P-5'-P-CCNC: 14 U/L (ref 10–44)
AMMONIA PLAS-SCNC: 57 UMOL/L (ref 10–50)
ANION GAP SERPL CALC-SCNC: 9 MMOL/L (ref 8–16)
APTT PPP: 26.8 SEC (ref 21–32)
AST SERPL-CCNC: 39 U/L (ref 10–40)
BACTERIA #/AREA URNS AUTO: ABNORMAL /HPF
BASOPHILS # BLD AUTO: 0.02 K/UL (ref 0–0.2)
BASOPHILS NFR BLD: 0.6 % (ref 0–1.9)
BILIRUB SERPL-MCNC: 3.5 MG/DL (ref 0.1–1)
BILIRUB UR QL STRIP: NEGATIVE
BNP SERPL-MCNC: 90 PG/ML (ref 0–99)
BUN SERPL-MCNC: 6 MG/DL (ref 6–20)
CALCIUM SERPL-MCNC: 8 MG/DL (ref 8.7–10.5)
CHLORIDE SERPL-SCNC: 115 MMOL/L (ref 95–110)
CK SERPL-CCNC: 46 U/L (ref 20–180)
CLARITY UR REFRACT.AUTO: ABNORMAL
CO2 SERPL-SCNC: 18 MMOL/L (ref 23–29)
COLOR UR AUTO: YELLOW
CREAT SERPL-MCNC: 0.6 MG/DL (ref 0.5–1.4)
D DIMER PPP IA.FEU-MCNC: 6.55 MG/L FEU
DIFFERENTIAL METHOD: ABNORMAL
EOSINOPHIL # BLD AUTO: 0.1 K/UL (ref 0–0.5)
EOSINOPHIL NFR BLD: 2.7 % (ref 0–8)
ERYTHROCYTE [DISTWIDTH] IN BLOOD BY AUTOMATED COUNT: 15.9 % (ref 11.5–14.5)
ERYTHROCYTE [SEDIMENTATION RATE] IN BLOOD BY PHOTOMETRIC METHOD: 7 MM/HR (ref 0–36)
EST. GFR  (NO RACE VARIABLE): >60 ML/MIN/1.73 M^2
FERRITIN SERPL-MCNC: 422 NG/ML (ref 20–300)
GLUCOSE SERPL-MCNC: 129 MG/DL (ref 70–110)
GLUCOSE UR QL STRIP: NEGATIVE
HCO3 UR-SCNC: 21.2 MMOL/L (ref 24–28)
HCT VFR BLD AUTO: 25.7 % (ref 37–48.5)
HGB BLD-MCNC: 8.4 G/DL (ref 12–16)
HGB UR QL STRIP: ABNORMAL
HYALINE CASTS UR QL AUTO: 0 /LPF
IMM GRANULOCYTES # BLD AUTO: 0.02 K/UL (ref 0–0.04)
IMM GRANULOCYTES NFR BLD AUTO: 0.6 % (ref 0–0.5)
INR PPP: 1.7 (ref 0.8–1.2)
KETONES UR QL STRIP: NEGATIVE
LACTATE SERPL-SCNC: 1.9 MMOL/L (ref 0.5–2.2)
LDH SERPL L TO P-CCNC: 2.78 MMOL/L (ref 0.5–2.2)
LDH SERPL L TO P-CCNC: 400 U/L (ref 110–260)
LEUKOCYTE ESTERASE UR QL STRIP: ABNORMAL
LYMPHOCYTES # BLD AUTO: 0.5 K/UL (ref 1–4.8)
LYMPHOCYTES NFR BLD: 14.9 % (ref 18–48)
MAGNESIUM SERPL-MCNC: 1.6 MG/DL (ref 1.6–2.6)
MCH RBC QN AUTO: 35.3 PG (ref 27–31)
MCHC RBC AUTO-ENTMCNC: 32.7 G/DL (ref 32–36)
MCV RBC AUTO: 108 FL (ref 82–98)
MICROSCOPIC COMMENT: ABNORMAL
MONOCYTES # BLD AUTO: 0.3 K/UL (ref 0.3–1)
MONOCYTES NFR BLD: 10.1 % (ref 4–15)
NEUTROPHILS # BLD AUTO: 2.4 K/UL (ref 1.8–7.7)
NEUTROPHILS NFR BLD: 71.1 % (ref 38–73)
NITRITE UR QL STRIP: NEGATIVE
NRBC BLD-RTO: 0 /100 WBC
PCO2 BLDA: 32 MMHG (ref 35–45)
PH SMN: 7.43 [PH] (ref 7.35–7.45)
PH UR STRIP: 7 [PH] (ref 5–8)
PLATELET # BLD AUTO: 67 K/UL (ref 150–450)
PMV BLD AUTO: 9 FL (ref 9.2–12.9)
PO2 BLDA: 38 MMHG (ref 40–60)
POC BE: -3 MMOL/L
POC SATURATED O2: 74 % (ref 95–100)
POC TCO2: 22 MMOL/L (ref 24–29)
POTASSIUM SERPL-SCNC: 3.2 MMOL/L (ref 3.5–5.1)
PROCALCITONIN SERPL IA-MCNC: 0.14 NG/ML
PROT SERPL-MCNC: 5.6 G/DL (ref 6–8.4)
PROT UR QL STRIP: ABNORMAL
PROTHROMBIN TIME: 17.8 SEC (ref 9–12.5)
RBC # BLD AUTO: 2.38 M/UL (ref 4–5.4)
RBC #/AREA URNS AUTO: 24 /HPF (ref 0–4)
SAMPLE: ABNORMAL
SAMPLE: ABNORMAL
SARS-COV-2 RDRP RESP QL NAA+PROBE: POSITIVE
SITE: ABNORMAL
SITE: ABNORMAL
SODIUM SERPL-SCNC: 142 MMOL/L (ref 136–145)
SP GR UR STRIP: 1.01 (ref 1–1.03)
SQUAMOUS #/AREA URNS AUTO: 2 /HPF
TROPONIN I SERPL DL<=0.01 NG/ML-MCNC: 0.01 NG/ML (ref 0–0.03)
TROPONIN I SERPL DL<=0.01 NG/ML-MCNC: <0.006 NG/ML (ref 0–0.03)
URN SPEC COLLECT METH UR: ABNORMAL
WBC # BLD AUTO: 3.35 K/UL (ref 3.9–12.7)
WBC #/AREA URNS AUTO: 95 /HPF (ref 0–5)
WBC CLUMPS UR QL AUTO: ABNORMAL

## 2023-09-12 PROCEDURE — 81001 URINALYSIS AUTO W/SCOPE: CPT | Performed by: PHYSICIAN ASSISTANT

## 2023-09-12 PROCEDURE — 80053 COMPREHEN METABOLIC PANEL: CPT | Performed by: PHYSICIAN ASSISTANT

## 2023-09-12 PROCEDURE — 82550 ASSAY OF CK (CPK): CPT | Performed by: FAMILY MEDICINE

## 2023-09-12 PROCEDURE — 99900035 HC TECH TIME PER 15 MIN (STAT)

## 2023-09-12 PROCEDURE — 87040 BLOOD CULTURE FOR BACTERIA: CPT | Performed by: PHYSICIAN ASSISTANT

## 2023-09-12 PROCEDURE — 83880 ASSAY OF NATRIURETIC PEPTIDE: CPT | Performed by: PHYSICIAN ASSISTANT

## 2023-09-12 PROCEDURE — 85610 PROTHROMBIN TIME: CPT | Performed by: FAMILY MEDICINE

## 2023-09-12 PROCEDURE — 63600175 PHARM REV CODE 636 W HCPCS: Mod: JZ,TB | Performed by: FAMILY MEDICINE

## 2023-09-12 PROCEDURE — U0002 COVID-19 LAB TEST NON-CDC: HCPCS | Performed by: PHYSICIAN ASSISTANT

## 2023-09-12 PROCEDURE — 83615 LACTATE (LD) (LDH) ENZYME: CPT | Performed by: FAMILY MEDICINE

## 2023-09-12 PROCEDURE — 99285 EMERGENCY DEPT VISIT HI MDM: CPT | Mod: 25

## 2023-09-12 PROCEDURE — 93005 ELECTROCARDIOGRAM TRACING: CPT

## 2023-09-12 PROCEDURE — 85730 THROMBOPLASTIN TIME PARTIAL: CPT | Performed by: FAMILY MEDICINE

## 2023-09-12 PROCEDURE — 87184 SC STD DISK METHOD PER PLATE: CPT | Performed by: PHYSICIAN ASSISTANT

## 2023-09-12 PROCEDURE — 84145 PROCALCITONIN (PCT): CPT | Performed by: FAMILY MEDICINE

## 2023-09-12 PROCEDURE — 83605 ASSAY OF LACTIC ACID: CPT | Performed by: FAMILY MEDICINE

## 2023-09-12 PROCEDURE — 27000207 HC ISOLATION

## 2023-09-12 PROCEDURE — 82803 BLOOD GASES ANY COMBINATION: CPT

## 2023-09-12 PROCEDURE — 82140 ASSAY OF AMMONIA: CPT | Performed by: PHYSICIAN ASSISTANT

## 2023-09-12 PROCEDURE — 25000003 PHARM REV CODE 250: Performed by: FAMILY MEDICINE

## 2023-09-12 PROCEDURE — 93010 ELECTROCARDIOGRAM REPORT: CPT | Mod: ,,, | Performed by: INTERNAL MEDICINE

## 2023-09-12 PROCEDURE — 20600001 HC STEP DOWN PRIVATE ROOM

## 2023-09-12 PROCEDURE — 87077 CULTURE AEROBIC IDENTIFY: CPT | Mod: 59 | Performed by: PHYSICIAN ASSISTANT

## 2023-09-12 PROCEDURE — 93010 EKG 12-LEAD: ICD-10-PCS | Mod: ,,, | Performed by: INTERNAL MEDICINE

## 2023-09-12 PROCEDURE — 82728 ASSAY OF FERRITIN: CPT | Performed by: FAMILY MEDICINE

## 2023-09-12 PROCEDURE — 85379 FIBRIN DEGRADATION QUANT: CPT | Performed by: FAMILY MEDICINE

## 2023-09-12 PROCEDURE — 83735 ASSAY OF MAGNESIUM: CPT | Performed by: PHYSICIAN ASSISTANT

## 2023-09-12 PROCEDURE — 83605 ASSAY OF LACTIC ACID: CPT

## 2023-09-12 PROCEDURE — 87086 URINE CULTURE/COLONY COUNT: CPT | Performed by: PHYSICIAN ASSISTANT

## 2023-09-12 PROCEDURE — 85025 COMPLETE CBC W/AUTO DIFF WBC: CPT | Performed by: PHYSICIAN ASSISTANT

## 2023-09-12 PROCEDURE — 87186 SC STD MICRODIL/AGAR DIL: CPT | Performed by: PHYSICIAN ASSISTANT

## 2023-09-12 PROCEDURE — 84484 ASSAY OF TROPONIN QUANT: CPT | Mod: 91 | Performed by: FAMILY MEDICINE

## 2023-09-12 PROCEDURE — 63700000 PHARM REV CODE 250 ALT 637 W/O HCPCS: Performed by: FAMILY MEDICINE

## 2023-09-12 PROCEDURE — 87154 CUL TYP ID BLD PTHGN 6+ TRGT: CPT | Performed by: PHYSICIAN ASSISTANT

## 2023-09-12 PROCEDURE — 84484 ASSAY OF TROPONIN QUANT: CPT | Performed by: PHYSICIAN ASSISTANT

## 2023-09-12 PROCEDURE — 85652 RBC SED RATE AUTOMATED: CPT | Performed by: FAMILY MEDICINE

## 2023-09-12 PROCEDURE — 87088 URINE BACTERIA CULTURE: CPT | Performed by: PHYSICIAN ASSISTANT

## 2023-09-12 RX ORDER — PROCHLORPERAZINE EDISYLATE 5 MG/ML
5 INJECTION INTRAMUSCULAR; INTRAVENOUS EVERY 6 HOURS PRN
Status: DISCONTINUED | OUTPATIENT
Start: 2023-09-12 | End: 2023-09-15 | Stop reason: HOSPADM

## 2023-09-12 RX ORDER — LITHIUM CARBONATE 300 MG/1
300 TABLET, FILM COATED, EXTENDED RELEASE ORAL NIGHTLY
Status: DISCONTINUED | OUTPATIENT
Start: 2023-09-12 | End: 2023-09-15 | Stop reason: HOSPADM

## 2023-09-12 RX ORDER — NALOXONE HCL 0.4 MG/ML
0.02 VIAL (ML) INJECTION
Status: DISCONTINUED | OUTPATIENT
Start: 2023-09-12 | End: 2023-09-15 | Stop reason: HOSPADM

## 2023-09-12 RX ORDER — LACTULOSE 10 G/15ML
30 SOLUTION ORAL 3 TIMES DAILY
Status: DISCONTINUED | OUTPATIENT
Start: 2023-09-12 | End: 2023-09-15 | Stop reason: HOSPADM

## 2023-09-12 RX ORDER — IBUPROFEN 200 MG
16 TABLET ORAL
Status: DISCONTINUED | OUTPATIENT
Start: 2023-09-12 | End: 2023-09-15 | Stop reason: HOSPADM

## 2023-09-12 RX ORDER — GUAIFENESIN/DEXTROMETHORPHAN 100-10MG/5
10 SYRUP ORAL EVERY 4 HOURS PRN
Status: DISCONTINUED | OUTPATIENT
Start: 2023-09-12 | End: 2023-09-15 | Stop reason: HOSPADM

## 2023-09-12 RX ORDER — AZITHROMYCIN 250 MG/1
500 TABLET, FILM COATED ORAL
Status: COMPLETED | OUTPATIENT
Start: 2023-09-12 | End: 2023-09-14

## 2023-09-12 RX ORDER — TALC
6 POWDER (GRAM) TOPICAL NIGHTLY PRN
Status: DISCONTINUED | OUTPATIENT
Start: 2023-09-12 | End: 2023-09-15 | Stop reason: HOSPADM

## 2023-09-12 RX ORDER — ONDANSETRON 2 MG/ML
4 INJECTION INTRAMUSCULAR; INTRAVENOUS EVERY 8 HOURS PRN
Status: DISCONTINUED | OUTPATIENT
Start: 2023-09-12 | End: 2023-09-15 | Stop reason: HOSPADM

## 2023-09-12 RX ORDER — IBUPROFEN 200 MG
24 TABLET ORAL
Status: DISCONTINUED | OUTPATIENT
Start: 2023-09-12 | End: 2023-09-15 | Stop reason: HOSPADM

## 2023-09-12 RX ORDER — SODIUM CHLORIDE 0.9 % (FLUSH) 0.9 %
10 SYRINGE (ML) INJECTION
Status: DISCONTINUED | OUTPATIENT
Start: 2023-09-12 | End: 2023-09-15 | Stop reason: HOSPADM

## 2023-09-12 RX ORDER — PANTOPRAZOLE SODIUM 40 MG/1
40 FOR SUSPENSION ORAL 2 TIMES DAILY
Status: DISCONTINUED | OUTPATIENT
Start: 2023-09-12 | End: 2023-09-15 | Stop reason: HOSPADM

## 2023-09-12 RX ORDER — ASCORBIC ACID 500 MG
500 TABLET ORAL 2 TIMES DAILY
Status: DISCONTINUED | OUTPATIENT
Start: 2023-09-12 | End: 2023-09-15 | Stop reason: HOSPADM

## 2023-09-12 RX ORDER — ACETAMINOPHEN 500 MG
500 TABLET ORAL EVERY 6 HOURS PRN
Status: DISCONTINUED | OUTPATIENT
Start: 2023-09-12 | End: 2023-09-15 | Stop reason: HOSPADM

## 2023-09-12 RX ORDER — FUROSEMIDE 20 MG/1
20 TABLET ORAL DAILY
Status: DISCONTINUED | OUTPATIENT
Start: 2023-09-13 | End: 2023-09-15 | Stop reason: HOSPADM

## 2023-09-12 RX ORDER — ALBUTEROL SULFATE 90 UG/1
2 AEROSOL, METERED RESPIRATORY (INHALATION) EVERY 6 HOURS
Status: DISCONTINUED | OUTPATIENT
Start: 2023-09-13 | End: 2023-09-15 | Stop reason: HOSPADM

## 2023-09-12 RX ORDER — GLUCAGON 1 MG
1 KIT INJECTION
Status: DISCONTINUED | OUTPATIENT
Start: 2023-09-12 | End: 2023-09-15 | Stop reason: HOSPADM

## 2023-09-12 RX ORDER — LEVETIRACETAM 100 MG/ML
1000 SOLUTION ORAL 2 TIMES DAILY
Status: DISCONTINUED | OUTPATIENT
Start: 2023-09-12 | End: 2023-09-15 | Stop reason: HOSPADM

## 2023-09-12 RX ORDER — OLANZAPINE 5 MG/1
5 TABLET ORAL EVERY 6 HOURS PRN
Status: DISCONTINUED | OUTPATIENT
Start: 2023-09-12 | End: 2023-09-15 | Stop reason: HOSPADM

## 2023-09-12 RX ADMIN — RIFAXIMIN 550 MG: 550 TABLET ORAL at 11:09

## 2023-09-12 RX ADMIN — LITHIUM CARBONATE 300 MG: 300 TABLET, FILM COATED, EXTENDED RELEASE ORAL at 11:09

## 2023-09-12 RX ADMIN — PANTOPRAZOLE SODIUM 40 MG: 40 GRANULE, DELAYED RELEASE ORAL at 11:09

## 2023-09-12 RX ADMIN — OXYCODONE HYDROCHLORIDE AND ACETAMINOPHEN 500 MG: 500 TABLET ORAL at 11:09

## 2023-09-12 RX ADMIN — AZITHROMYCIN 500 MG: 250 TABLET, FILM COATED ORAL at 11:09

## 2023-09-12 RX ADMIN — CEFTRIAXONE 1 G: 1 INJECTION, POWDER, FOR SOLUTION INTRAMUSCULAR; INTRAVENOUS at 11:09

## 2023-09-12 RX ADMIN — LEVETIRACETAM 1000 MG: 100 SOLUTION ORAL at 11:09

## 2023-09-12 RX ADMIN — LACTULOSE 30 G: 20 SOLUTION ORAL at 11:09

## 2023-09-12 RX ADMIN — REMDESIVIR 200 MG: 100 INJECTION, POWDER, LYOPHILIZED, FOR SOLUTION INTRAVENOUS at 11:09

## 2023-09-12 NOTE — ED PROVIDER NOTES
Encounter Date: 9/12/2023       History     Chief Complaint   Patient presents with    Shortness of Breath     Arrives via EMS from [facility], c/o SOB since this morning. Had portable chest x ray and sent here for r/o pneumonia. On 2L NC      The history is provided by the patient and medical records. No  was used.     Marely Hamilton is a 58 y.o. female with medical history of ETOH Cirrhosis, Hepatic encephalopathy, Recurrent GI bleed, Chronic LE DVT s/p IVC filter, E coli bacteremia presenting to the ED with the chief complaint of shortness of breath.     Limited history from patient 2/2 confusion. Presents via EMS from [facility] for concerns of PNA. CXR today showed RLL opacities. I called nursing home who state patient is confused and hallucinates at her baseline. They state she appeared SOB earlier today which led to her getting a CXR. No reported fever, chest pain, abdominal pain, vomiting, urinary or bowel movement changes. No oxygen use at home.     Review of patient's allergies indicates:   Allergen Reactions    Sulfa (sulfonamide antibiotics) Rash    Codeine Nausea And Vomiting     Past Medical History:   Diagnosis Date    Addiction to drug     Alcohol abuse     Alcohol abuse, in remission 6/15/2015    14.5 weeks ago; AA weekly    Anemia     Anxiety 6/15/2015    Behavioral problem     Bipolar disorder     Bipolar disorder in remission 6/15/2015    Cirrhosis, Laennec's 6/15/2015    Depression     Encounter for blood transfusion     Epistaxis 6/15/2015    Fatigue     Glaucoma     Hematuria     Hepatic encephalopathy 6/15/2015    Hepatic enlargement     History of psychiatric care     History of psychiatric hospitalization     History of seizure 6/15/2015    1    hx of intentional Tylenol overdose 6/15/2015    2005- situational and hx of bipolar    Hx of psychiatric care     Macrocytic anemia 9/18/2015    6 units PRBC since June 2015    [facility]     Osteoarthritis     Other ascites 6/15/2015     Psychiatric exam requested by authority     Psychiatric problem     Psychosis 9/26/2019    Renal disorder     Seizures     Self-harming behavior     Suicide attempt     Therapy     Thrombocytopenia 6/15/2015     Past Surgical History:   Procedure Laterality Date    COSMETIC SURGERY      EMBOLIZATION N/A 6/11/2023    Procedure: EMBOLIZATION, BLOOD VESSEL;  Surgeon: Debbie Surgeon;  [facility]: [facility];  Service: Anesthesiology;  Laterality: N/A;    ESOPHAGOGASTRODUODENOSCOPY      ESOPHAGOGASTRODUODENOSCOPY N/A 7/6/2023    Procedure: EGD (ESOPHAGOGASTRODUODENOSCOPY);  Surgeon: Bernice Guillen MD;  Location: [facility] ([address] [place] [address]);  Service: Endoscopy;  Laterality: N/A;    ESOPHAGOGASTRODUODENOSCOPY N/A 7/11/2023    Procedure: EGD (ESOPHAGOGASTRODUODENOSCOPY);  Surgeon: Rangel Broussard MD;  [facility]  Service: Endoscopy;  Laterality: N/A;     Family History   Problem Relation Age of Onset    Alcohol abuse Father     Suicide Father     Bipolar disorder Father     Alcohol abuse Sister     Hypertension Mother     Alcohol abuse Paternal Grandfather     Drug abuse Neg Hx     Dementia Neg Hx      Social History     Tobacco Use    Smoking status: Never    Smokeless tobacco: Never   Substance Use Topics    Alcohol use: Not Currently     Comment: hx of ETOH abuse with cirrhosis of liver    Drug use: No     Review of Systems   Unable to perform ROS: Mental status change     Physical Exam     Initial Vitals [09/12/23 1610]   BP Pulse Resp Temp SpO2   (!) 131/52 75 20 98.2 °F (36.8 °C) 100 %      MAP       --         Physical Exam    Constitutional: She appears well-developed and well-nourished. She is not diaphoretic. No distress.   HENT:   Head: Normocephalic and atraumatic.   Eyes: EOM are normal. Pupils are equal, round, and reactive to light.   Neck: Neck supple.   Normal range of motion.  Cardiovascular:  Normal rate and regular rhythm.           Pulmonary/Chest: No respiratory distress.   POx 92-94%    Genitourinary:    Genitourinary Comments: Stage 2 pressure ulceration to lower back and inner glutes     Musculoskeletal:         General: Normal range of motion.      Cervical back: Normal range of motion and neck supple.     Neurological: She is alert and oriented to person, place, and time.   Alert. Oriented to person. Hallucinating that she is at home with her dead mother and dead dog. Follows commands.    Skin: Skin is warm and dry. No rash noted.   Jaundiced       ED Course   Procedures  Labs Reviewed   CBC W/ AUTO DIFFERENTIAL - Abnormal; Notable for the following components:       Result Value    WBC 3.35 (*)     RBC 2.38 (*)     Hemoglobin 8.4 (*)     Hematocrit 25.7 (*)      (*)     MCH 35.3 (*)     RDW 15.9 (*)     Platelets 67 (*)     MPV 9.0 (*)     Immature Granulocytes 0.6 (*)     Lymph # 0.5 (*)     Lymph % 14.9 (*)     All other components within normal limits   COMPREHENSIVE METABOLIC PANEL - Abnormal; Notable for the following components:    Potassium 3.2 (*)     Chloride 115 (*)     CO2 18 (*)     Glucose 129 (*)     Calcium 8.0 (*)     Total Protein 5.6 (*)     Albumin 1.7 (*)     Total Bilirubin 3.5 (*)     Alkaline Phosphatase 143 (*)     All other components within normal limits   AMMONIA - Abnormal; Notable for the following components:    Ammonia 57 (*)     All other components within normal limits   SARS-COV-2 RNA AMPLIFICATION, QUAL - Abnormal; Notable for the following components:    SARS-CoV-2 RNA, Amplification, Qual Positive (*)     All other components within normal limits   ISTAT PROCEDURE - Abnormal; Notable for the following components:    POC PCO2 32.0 (*)     POC PO2 38 (*)     POC HCO3 21.2 (*)     POC SATURATED O2 74 (*)     POC TCO2 22 (*)     All other components within normal limits   ISTAT LACTATE - Abnormal; Notable for the following components:    POC Lactate 2.78 (*)     All other components within normal limits   CULTURE, BLOOD   CULTURE, BLOOD   MAGNESIUM    TROPONIN I   B-TYPE NATRIURETIC PEPTIDE   URINALYSIS, REFLEX TO URINE CULTURE          Imaging Results              X-Ray Chest AP Portable (Final result)  Result time 09/12/23 18:53:21      Final result by Willis Loomis MD (09/12/23 18:53:21)                   Impression:      1. Interstitial findings may reflect edema noting possible small right pleural effusion.  There is bandlike atelectasis projected over the right lower lung zone.      Electronically signed by: Willis Loomis MD  Date:    09/12/2023  Time:    18:53               Narrative:    EXAMINATION:  XR CHEST AP PORTABLE    CLINICAL HISTORY:  Shortness of breath    TECHNIQUE:  Single frontal view of the chest was performed.    COMPARISON:  08/31/2023    FINDINGS:  The cardiomediastinal silhouette is prominent, similar to the previous exam noting magnification by technique..  There is obscuration of the right costophrenic angle suggesting effusion or pleural thickening..  The trachea is midline.  The lungs are symmetrically expanded bilaterally with coarse interstitial attenuation bilaterally.  There is bandlike atelectasis projected over the right lower lung zone..  There is no pneumothorax.  The osseous structures are remarkable for degenerative changes..                                       Medications - No data to display  Medical Decision Making  58 y.o. female with medical history of ETOH Cirrhosis, Hepatic encephalopathy, Recurrent GI bleed, Chronic LE DVT s/p IVC filter, E coli bacteremia presenting to the ED from [facility] for concerns of PNA on outpatient CXR.     DDx includes but not limited to pneumonia, bacteremia, sepsis, metabolic encephalopathy, hepatic encephalopathy, liver failure, viral syndrome    Problems Addressed:  COVID-19 virus infection: complicated acute illness or injury with systemic symptoms  Metabolic encephalopathy: complicated acute illness or injury    Amount and/or Complexity of Data Reviewed  Independent  Historian: EMS  Labs: ordered. Decision-making details documented in ED Course.  Radiology: ordered.  Discussion of management or test interpretation with external provider(s): Hospital Medicine - decision regarding admission    Risk  Decision regarding hospitalization.               ED Course as of 09/12/23 2015   Tue Sep 12, 2023   2007 POC Lactate(!): 2.78  Elevated likely from baseline liver disease [BA]   2008 CXR showing possible small right pleural effusion. No focal consoldiation. [BA]   2008 SARS-CoV-2 RNA, Amplification, Qual(!): Positive [BA]   2008 Ammonia(!): 57  Appears to be her baseline.  [BA]   2008 Creatinine: 0.6 [BA]   2008 Troponin I: 0.009 [BA]   2008 BNP: 90 [BA]   2014 Discussed with , will admit for COVID management and metabolic encephalopathy. Currently satting 95% on RA. I have discussed the care of this patient with my supervising physician.  [BA]      ED Course User Index  [BA] Jamar Irving PA-C                    Clinical Impression:   Final diagnoses:  [R06.02] SOB (shortness of breath)  [U07.1] COVID-19 virus infection (Primary)  [G93.41] Metabolic encephalopathy        ED Disposition Condition    Admit Stable                Jamar Irving PA-C  09/12/23 2015

## 2023-09-12 NOTE — PHYSICIAN QUERY
PT Name: Marely Hamilton  MR #: [id number]     DOCUMENTATION CLARIFICATION      CDS/: Saima Gudino RN, CDI            Contact information:[email or web address]     This form is a permanent document in the medical record.     Query Date: September 12, 2023    Dear Provider,  By submitting this query, we are merely seeking further clarification of documentation.  Please utilize your independent clinical judgment when addressing the question(s) below.     The Medical Record contains the following:    Supporting Clinical Findings Location in Medical Record   Of note, pt. Recently admitted to [facility] for E.coli bacteremia thought to be related to a UTI. ID recommended midline placement for ceftriaxone 2gm IV daily for total of 2 wks from 8/14 to 8/28/23. Pt. Reports that she completed this and her midline was removed.    Acute cystitis  U/A with >100 WBC, many bacteria. UTI suspected to be source of recent bactermia, will treat with ceftriaxone, f/u urine and blood cultures   H&P 9/1   Pt denies chest pain, SOB, abdominal pain, dysuria, constipation, diarrhea.   ED provider note 8/31   Urine Culture, Routine Multiple organisms isolated. None in predominance.  Repeat if clinically necessary.     Blood Culture, Routine No growth after 5 days.    Micro 8/31      Micro 9/1   cefTRIAXone (ROCEPHIN) 2 g in dextrose 5 % in water (D5W) 100 mL IVPB   Starts/Ends: 09/01/23 0230 - 09/03/23 0913   MAR     Please clarify if the ___Acute cystitis____ diagnosis has been:    [  x] Ruled In   [  ] Ruled Out   [  ] Still to be ruled out at the time of discharge   [  ] Other/Clarification of findings (please specify): _______________    [   ] Clinically undetermined       Please document in your progress notes daily for the duration of treatment, until resolved, and include in your discharge summary.    Form No. [id number]

## 2023-09-12 NOTE — ED TRIAGE NOTES
Marely Hamilton, a 58 y.o. female presents to the ED w/ complaint of SOB, pt stated it started this morning.     Triage note:  Chief Complaint   Patient presents with    Shortness of Breath     Arrives via EMS from [facility], c/o BENJAMIN since this morning. Had portable chest x ray and sent here for r/o pneumonia. On 2L NC      Review of patient's allergies indicates:   Allergen Reactions    Sulfa (sulfonamide antibiotics) Rash    Codeine Nausea And Vomiting     Past Medical History:   Diagnosis Date    Addiction to drug     Alcohol abuse     Alcohol abuse, in remission 6/15/2015    14.5 weeks ago; AA weekly    Anemia     Anxiety 6/15/2015    Behavioral problem     Bipolar disorder     Bipolar disorder in remission 6/15/2015    Cirrhosis, Rajeev's 6/15/2015    Depression     Encounter for blood transfusion     Epistaxis 6/15/2015    Fatigue     Glaucoma     Hematuria     Hepatic encephalopathy 6/15/2015    Hepatic enlargement     History of psychiatric care     History of psychiatric hospitalization     History of seizure 6/15/2015    1    hx of intentional Tylenol overdose 6/15/2015    2005- situational and hx of bipolar    Hx of psychiatric care     Macrocytic anemia 9/18/2015    6 units PRBC since June 2015    Jeana     Osteoarthritis     Other ascites 6/15/2015    Psychiatric exam requested by authority     Psychiatric problem     Psychosis 9/26/2019    Renal disorder     Seizures     Self-harming behavior     Suicide attempt     Therapy     Thrombocytopenia 6/15/2015

## 2023-09-13 PROBLEM — U07.1 PNEUMONIA DUE TO COVID-19 VIRUS: Status: ACTIVE | Noted: 2023-09-13

## 2023-09-13 PROBLEM — R53.81 DEBILITY: Status: ACTIVE | Noted: 2023-09-13

## 2023-09-13 PROBLEM — Z71.89 ADVANCE CARE PLANNING: Status: ACTIVE | Noted: 2023-09-13

## 2023-09-13 PROBLEM — J12.82 PNEUMONIA DUE TO COVID-19 VIRUS: Status: ACTIVE | Noted: 2023-09-13

## 2023-09-13 PROBLEM — Z51.5 PALLIATIVE CARE ENCOUNTER: Status: ACTIVE | Noted: 2023-09-13

## 2023-09-13 LAB
ALBUMIN SERPL BCP-MCNC: 1.7 G/DL (ref 3.5–5.2)
ALP SERPL-CCNC: 128 U/L (ref 55–135)
ALT SERPL W/O P-5'-P-CCNC: 14 U/L (ref 10–44)
ANION GAP SERPL CALC-SCNC: 8 MMOL/L (ref 8–16)
AST SERPL-CCNC: 38 U/L (ref 10–40)
BASOPHILS # BLD AUTO: 0.02 K/UL (ref 0–0.2)
BASOPHILS # BLD AUTO: 0.02 K/UL (ref 0–0.2)
BASOPHILS NFR BLD: 0.8 % (ref 0–1.9)
BASOPHILS NFR BLD: 0.8 % (ref 0–1.9)
BILIRUB SERPL-MCNC: 3.3 MG/DL (ref 0.1–1)
BUN SERPL-MCNC: 6 MG/DL (ref 6–20)
CALCIUM SERPL-MCNC: 7.6 MG/DL (ref 8.7–10.5)
CHLORIDE SERPL-SCNC: 115 MMOL/L (ref 95–110)
CO2 SERPL-SCNC: 20 MMOL/L (ref 23–29)
CREAT SERPL-MCNC: 0.5 MG/DL (ref 0.5–1.4)
DIFFERENTIAL METHOD: ABNORMAL
DIFFERENTIAL METHOD: ABNORMAL
EOSINOPHIL # BLD AUTO: 0.1 K/UL (ref 0–0.5)
EOSINOPHIL # BLD AUTO: 0.1 K/UL (ref 0–0.5)
EOSINOPHIL NFR BLD: 4.6 % (ref 0–8)
EOSINOPHIL NFR BLD: 4.6 % (ref 0–8)
ERYTHROCYTE [DISTWIDTH] IN BLOOD BY AUTOMATED COUNT: 15.7 % (ref 11.5–14.5)
ERYTHROCYTE [DISTWIDTH] IN BLOOD BY AUTOMATED COUNT: 15.7 % (ref 11.5–14.5)
EST. GFR  (NO RACE VARIABLE): >60 ML/MIN/1.73 M^2
GLUCOSE SERPL-MCNC: 101 MG/DL (ref 70–110)
HCT VFR BLD AUTO: 24.2 % (ref 37–48.5)
HCT VFR BLD AUTO: 24.2 % (ref 37–48.5)
HGB BLD-MCNC: 7.8 G/DL (ref 12–16)
HGB BLD-MCNC: 7.8 G/DL (ref 12–16)
IMM GRANULOCYTES # BLD AUTO: 0.01 K/UL (ref 0–0.04)
IMM GRANULOCYTES # BLD AUTO: 0.01 K/UL (ref 0–0.04)
IMM GRANULOCYTES NFR BLD AUTO: 0.4 % (ref 0–0.5)
IMM GRANULOCYTES NFR BLD AUTO: 0.4 % (ref 0–0.5)
INR PPP: 1.7 (ref 0.8–1.2)
LITHIUM SERPL-SCNC: 0.5 MMOL/L (ref 0.6–1.2)
LYMPHOCYTES # BLD AUTO: 0.4 K/UL (ref 1–4.8)
LYMPHOCYTES # BLD AUTO: 0.4 K/UL (ref 1–4.8)
LYMPHOCYTES NFR BLD: 16.5 % (ref 18–48)
LYMPHOCYTES NFR BLD: 16.5 % (ref 18–48)
MCH RBC QN AUTO: 35.1 PG (ref 27–31)
MCH RBC QN AUTO: 35.1 PG (ref 27–31)
MCHC RBC AUTO-ENTMCNC: 32.2 G/DL (ref 32–36)
MCHC RBC AUTO-ENTMCNC: 32.2 G/DL (ref 32–36)
MCV RBC AUTO: 109 FL (ref 82–98)
MCV RBC AUTO: 109 FL (ref 82–98)
MONOCYTES # BLD AUTO: 0.2 K/UL (ref 0.3–1)
MONOCYTES # BLD AUTO: 0.2 K/UL (ref 0.3–1)
MONOCYTES NFR BLD: 9.2 % (ref 4–15)
MONOCYTES NFR BLD: 9.2 % (ref 4–15)
NEUTROPHILS # BLD AUTO: 1.8 K/UL (ref 1.8–7.7)
NEUTROPHILS # BLD AUTO: 1.8 K/UL (ref 1.8–7.7)
NEUTROPHILS NFR BLD: 68.5 % (ref 38–73)
NEUTROPHILS NFR BLD: 68.5 % (ref 38–73)
NRBC BLD-RTO: 0 /100 WBC
NRBC BLD-RTO: 0 /100 WBC
PLATELET # BLD AUTO: 58 K/UL (ref 150–450)
PLATELET # BLD AUTO: 58 K/UL (ref 150–450)
PMV BLD AUTO: 9.7 FL (ref 9.2–12.9)
PMV BLD AUTO: 9.7 FL (ref 9.2–12.9)
POCT GLUCOSE: 122 MG/DL (ref 70–110)
POCT GLUCOSE: 203 MG/DL (ref 70–110)
POTASSIUM SERPL-SCNC: 2.9 MMOL/L (ref 3.5–5.1)
PROT SERPL-MCNC: 5.3 G/DL (ref 6–8.4)
PROTHROMBIN TIME: 18.1 SEC (ref 9–12.5)
RBC # BLD AUTO: 2.22 M/UL (ref 4–5.4)
RBC # BLD AUTO: 2.22 M/UL (ref 4–5.4)
SODIUM SERPL-SCNC: 143 MMOL/L (ref 136–145)
WBC # BLD AUTO: 2.6 K/UL (ref 3.9–12.7)
WBC # BLD AUTO: 2.6 K/UL (ref 3.9–12.7)

## 2023-09-13 PROCEDURE — 97165 OT EVAL LOW COMPLEX 30 MIN: CPT

## 2023-09-13 PROCEDURE — 94761 N-INVAS EAR/PLS OXIMETRY MLT: CPT

## 2023-09-13 PROCEDURE — 99223 1ST HOSP IP/OBS HIGH 75: CPT | Mod: ,,, | Performed by: CLINICAL NURSE SPECIALIST

## 2023-09-13 PROCEDURE — 63700000 PHARM REV CODE 250 ALT 637 W/O HCPCS: Performed by: FAMILY MEDICINE

## 2023-09-13 PROCEDURE — 27000207 HC ISOLATION

## 2023-09-13 PROCEDURE — 36415 COLL VENOUS BLD VENIPUNCTURE: CPT | Performed by: FAMILY MEDICINE

## 2023-09-13 PROCEDURE — 63600175 PHARM REV CODE 636 W HCPCS: Performed by: HOSPITALIST

## 2023-09-13 PROCEDURE — 20600001 HC STEP DOWN PRIVATE ROOM

## 2023-09-13 PROCEDURE — 97161 PT EVAL LOW COMPLEX 20 MIN: CPT

## 2023-09-13 PROCEDURE — 25000242 PHARM REV CODE 250 ALT 637 W/ HCPCS: Performed by: FAMILY MEDICINE

## 2023-09-13 PROCEDURE — 25000003 PHARM REV CODE 250: Performed by: FAMILY MEDICINE

## 2023-09-13 PROCEDURE — 63600175 PHARM REV CODE 636 W HCPCS: Performed by: FAMILY MEDICINE

## 2023-09-13 PROCEDURE — 99900035 HC TECH TIME PER 15 MIN (STAT)

## 2023-09-13 PROCEDURE — 99223 1ST HOSP IP/OBS HIGH 75: CPT | Mod: ,,, | Performed by: FAMILY MEDICINE

## 2023-09-13 PROCEDURE — 85025 COMPLETE CBC W/AUTO DIFF WBC: CPT | Performed by: FAMILY MEDICINE

## 2023-09-13 PROCEDURE — 94640 AIRWAY INHALATION TREATMENT: CPT

## 2023-09-13 PROCEDURE — 80053 COMPREHEN METABOLIC PANEL: CPT | Performed by: FAMILY MEDICINE

## 2023-09-13 PROCEDURE — 99223 PR INITIAL HOSPITAL CARE,LEVL III: ICD-10-PCS | Mod: ,,, | Performed by: CLINICAL NURSE SPECIALIST

## 2023-09-13 PROCEDURE — 85610 PROTHROMBIN TIME: CPT | Performed by: FAMILY MEDICINE

## 2023-09-13 PROCEDURE — 25000003 PHARM REV CODE 250: Performed by: HOSPITALIST

## 2023-09-13 PROCEDURE — 27100098 HC SPACER

## 2023-09-13 PROCEDURE — 99223 PR INITIAL HOSPITAL CARE,LEVL III: ICD-10-PCS | Mod: ,,, | Performed by: FAMILY MEDICINE

## 2023-09-13 PROCEDURE — 80178 ASSAY OF LITHIUM: CPT | Performed by: FAMILY MEDICINE

## 2023-09-13 RX ADMIN — MICONAZOLE NITRATE: 20 OINTMENT TOPICAL at 08:09

## 2023-09-13 RX ADMIN — PANTOPRAZOLE SODIUM 40 MG: 40 GRANULE, DELAYED RELEASE ORAL at 08:09

## 2023-09-13 RX ADMIN — ALBUTEROL SULFATE 2 PUFF: 108 INHALANT RESPIRATORY (INHALATION) at 02:09

## 2023-09-13 RX ADMIN — LACTULOSE 30 G: 20 SOLUTION ORAL at 08:09

## 2023-09-13 RX ADMIN — POTASSIUM BICARBONATE 50 MEQ: 978 TABLET, EFFERVESCENT ORAL at 11:09

## 2023-09-13 RX ADMIN — ACETAMINOPHEN 500 MG: 500 TABLET ORAL at 09:09

## 2023-09-13 RX ADMIN — FUROSEMIDE 20 MG: 20 TABLET ORAL at 11:09

## 2023-09-13 RX ADMIN — RIFAXIMIN 550 MG: 550 TABLET ORAL at 08:09

## 2023-09-13 RX ADMIN — CEFTRIAXONE 1 G: 1 INJECTION, POWDER, FOR SOLUTION INTRAMUSCULAR; INTRAVENOUS at 08:09

## 2023-09-13 RX ADMIN — Medication 6 MG: at 09:09

## 2023-09-13 RX ADMIN — ALBUTEROL SULFATE 2 PUFF: 108 INHALANT RESPIRATORY (INHALATION) at 09:09

## 2023-09-13 RX ADMIN — LEVETIRACETAM 1000 MG: 100 SOLUTION ORAL at 08:09

## 2023-09-13 RX ADMIN — OXYCODONE HYDROCHLORIDE AND ACETAMINOPHEN 500 MG: 500 TABLET ORAL at 11:09

## 2023-09-13 RX ADMIN — RIFAXIMIN 550 MG: 550 TABLET ORAL at 11:09

## 2023-09-13 RX ADMIN — THERA TABS 1 TABLET: TAB at 11:09

## 2023-09-13 RX ADMIN — LITHIUM CARBONATE 300 MG: 300 TABLET, FILM COATED, EXTENDED RELEASE ORAL at 08:09

## 2023-09-13 RX ADMIN — AZITHROMYCIN 500 MG: 250 TABLET, FILM COATED ORAL at 09:09

## 2023-09-13 RX ADMIN — LACTULOSE 30 G: 20 SOLUTION ORAL at 11:09

## 2023-09-13 RX ADMIN — DEXAMETHASONE 6 MG: 4 TABLET ORAL at 11:09

## 2023-09-13 RX ADMIN — ALBUTEROL SULFATE 2 PUFF: 108 INHALANT RESPIRATORY (INHALATION) at 12:09

## 2023-09-13 RX ADMIN — LACTULOSE 30 G: 20 SOLUTION ORAL at 03:09

## 2023-09-13 RX ADMIN — OXYCODONE HYDROCHLORIDE AND ACETAMINOPHEN 500 MG: 500 TABLET ORAL at 08:09

## 2023-09-13 RX ADMIN — LEVETIRACETAM 1000 MG: 100 SOLUTION ORAL at 11:09

## 2023-09-13 RX ADMIN — PANTOPRAZOLE SODIUM 40 MG: 40 GRANULE, DELAYED RELEASE ORAL at 11:09

## 2023-09-13 NOTE — ASSESSMENT & PLAN NOTE
Severe protein-calorie malnutrition     Nutrition Problem:  Severe Protein-Calorie Malnutrition  Malnutrition in the context of Social/Environmental Circumstances     Related to (etiology):  Inability to consume sufficient energy     Signs and Symptoms (as evidenced by):  Body Fat Depletion: moderate and severe depletion of orbitals and triceps   Muscle Mass Depletion: moderate and severe depletion of temples, clavicle region and scapular region   Weight Loss: 8% x 2-3 months     Interventions(treatment strategy):  Collaboration of nutrition care w/ other providers  [facility]     Nutrition Diagnosis Status:  New/Continues

## 2023-09-13 NOTE — PT/OT/SLP EVAL
"Occupational Therapy   Evaluation    Co-eval with PT to have 2 skilled therapists present to safely assess pt's functional mobility.     Name: Marely Hamilton  MRN: [id number]  Admitting Diagnosis: Pneumonia due to COVID-19 virus  Recent Surgery: * No surgery found *      Recommendations:     Discharge Recommendations: nursing facility, skilled  Discharge Equipment Recommendations:  to be determined by next level of care  Barriers to discharge:  Other (Comment) (increased assistance required for ADLs and mobility)    Assessment:     Marely Hamilton is a 58 y.o. female with a medical diagnosis of Pneumonia due to COVID-19 virus.   Pt had poor tolerance of assessment due to pain.  She was confused, disoriented, and agitated.  Pt requires significant assistance for ADLs and mobility.  Due to her current level of function compared to her PLOF, return to SNF recommended at d/c for maximal pt gains in functional independence.  She presents with the following.  Performance deficits affecting function: weakness, impaired endurance, impaired self care skills, impaired functional mobility, gait instability, impaired balance, impaired cognition, edema, decreased lower extremity function, decreased ROM, pain.      Rehab Prognosis: Fair; patient would benefit from acute skilled OT services to address these deficits and reach maximum level of function.       Plan:     Patient to be seen 3 x/week to address the above listed problems via self-care/home management, therapeutic exercises, therapeutic activities  Plan of Care Expires: 10/13/23  Plan of Care Reviewed with: patient    Subjective   "My mother's hair always looks good.  Mine doesn't."  Chief Complaint: LE pain during mobility  Patient/Family Comments/goals: "No smoking!  No smoking!"    Occupational Profile: (per chart and pt, who's a questionable historian)  Living Environment: Per chart, pt resides at [facility].  She initially said she lives with her mother.   Previous " level of function: (A) for ADLs and mobility.  Pt said she tries to walk but doesn't receive OT/PT.   Roles and Routines: NH resident, daughter  Equipment Used at Home: walker, rolling, wheelchair  Assistance upon Discharge: SNF staff     Pain/Comfort:  Pain Rating 1: other (see comments) (not rated)  Location - Side 1: Bilateral  Location - Orientation 1: generalized  Location 1:  (leg during mobility)  Pain Addressed 1: Nurse notified, Cessation of Activity, Distraction, Reposition  Pain Rating Post-Intervention 1: 0/10    Patients cultural, spiritual, Mandaeism conflicts given the current situation: no    Objective:     Communicated with: nurse and PT prior to session.  Patient found HOB elevated with peripheral IV, baugh catheter upon OT entry to room.    General Precautions: Standard, fall, contact, airborne, droplet  Orthopedic Precautions: N/A  Braces: N/A  Respiratory Status: Room air    Occupational Performance:    Bed Mobility:    Pt verbalized agreement with sitting EOB.  However, once therapists removed pillows and blankets from underneath her legs, she screamed in pain.  After a break, as therapists began moving pt's legs toward EOB, she again screamed in pain and refused to continue.    Functional Mobility/Transfers:  Not performed - pt refusal due to pain    Activities of Daily Living:  Lower Body Dressing: total assistance to corey and doff non-skid sock while supine.  Sock removed due to pain.    Cognitive/Visual Perceptual:  Cognitive/Psychosocial Skills:     -       Oriented to: Person and year but not to place   -       Follows Commands/attention:Easily distracted and Follows some one-step commands  -       Communication: clear/fluent  -       Mood/Affect/Coping skills/emotional control: Agitated    Physical Exam:  Upper Extremity Range of Motion:  unable to formally assess due to pt's pain and confusion  Upper Extremity Strength: unable to formally assess due to pt's pain and confusion    - Pt's  BLE were very swollen.     AMPAC 6 Click ADL:  AMPAC Total Score: 11    Treatment & Education:  Pt edu on role of OT, POC, safety when performing self care tasks, benefit of performing EOB/OOB activity, and safety when performing functional transfers and mobility.  - Self care tasks completed-- as noted above      Patient left HOB elevated with all lines intact, call button in reach, bed alarm on, and nurse notified    GOALS:   Multidisciplinary Problems       Occupational Therapy Goals          Problem: Occupational Therapy    Goal Priority Disciplines Outcome Interventions   Occupational Therapy Goal     OT, PT/OT Ongoing, Progressing    Description: Goals to be met by: 9/27/2023     Patient will increase functional independence with ADLs by performing:    UE Dressing with Minimal Assistance.  LE Dressing with Moderate Assistance.  Grooming while EOB with Minimal Assistance.  Toileting from bedside commode with Maximum Assistance for hygiene and clothing management.   Supine to sit with Minimal Assistance.  Stand pivot transfer with Maximum Assistance using LRAD as needed.  Toilet transfer to toilet with Maximum Assistance using LRAD as needed.                         History:     Past Medical History:   Diagnosis Date    Addiction to drug     Alcohol abuse     Alcohol abuse, in remission 6/15/2015    14.5 weeks ago; AA weekly    Anemia     Anxiety 6/15/2015    Behavioral problem     Bipolar disorder     Bipolar disorder in remission 6/15/2015    Cirrhosis, Laennec's 6/15/2015    Depression     Encounter for blood transfusion     Epistaxis 6/15/2015    Fatigue     Glaucoma     Hematuria     Hepatic encephalopathy 6/15/2015    Hepatic enlargement     History of psychiatric care     History of psychiatric hospitalization     History of seizure 6/15/2015    1    hx of intentional Tylenol overdose 6/15/2015    2005- situational and hx of bipolar    Hx of psychiatric care     Macrocytic anemia 9/18/2015    6 units PRBC  since June 2015    Jeana     Osteoarthritis     Other ascites 6/15/2015    Psychiatric exam requested by authority     Psychiatric problem     Psychosis 9/26/2019    Renal disorder     Seizures     Self-harming behavior     Suicide attempt     Therapy     Thrombocytopenia 6/15/2015         Past Surgical History:   Procedure Laterality Date    COSMETIC SURGERY      EMBOLIZATION N/A 6/11/2023    Procedure: EMBOLIZATION, BLOOD VESSEL;  Surgeon: Debbie Surgeon;  Location: [facility];  Service: Anesthesiology;  Laterality: N/A;    ESOPHAGOGASTRODUODENOSCOPY      ESOPHAGOGASTRODUODENOSCOPY N/A 7/6/2023    Procedure: EGD (ESOPHAGOGASTRODUODENOSCOPY);  Surgeon: Bernice Guillen MD;  Location: [address]);  Service: Endoscopy;  Laterality: N/A;    ESOPHAGOGASTRODUODENOSCOPY N/A 7/11/2023    Procedure: EGD (ESOPHAGOGASTRODUODENOSCOPY);  Surgeon: Rangel Broussard MD;  Location: [address]);  Service: Endoscopy;  Laterality: N/A;       Time Tracking:     OT Date of Treatment: 09/13/23  OT Start Time: 1552  OT Stop Time: 1604  OT Total Time (min): 12 min    Billable Minutes:Evaluation 12 min    9/13/2023

## 2023-09-13 NOTE — H&P
[facility] - Telemetry [facility] Medicine  History & Physical    Patient Name: Marely Hamilton  MRN: [id number]  Patient Class: IP- Inpatient  Admission Date: 9/12/2023  Attending Physician: Lenka Ortiz MD   Primary Care Provider: Viktor Ross MD         Patient information was obtained from patient, past medical records and ER records.     Subjective:     Principal Problem:Pneumonia due to COVID-19 virus    Chief Complaint:   Chief Complaint   Patient presents with    Shortness of Breath     Arrives via EMS from [facility], c/o SOB since this morning. Had portable chest x ray and sent here for r/o pneumonia. On 2L NC         HPI: Marely Hamilton is a 58 y.o. female with medical history of ETOH Cirrhosis, Hepatic encephalopathy, Recurrent GI bleed, Chronic LE DVT s/p IVC filter, E coli bacteremia presenting to the ED with the chief complaint of shortness of breath. Patient with recent admission 08/31 to 09/07 for hepatic encephalopathy and UTI. Discharged in stable condition to NH facility where she presents from today.      Limited history from patient 2/2 confusion. Presents via EMS from [facility] for concerns of PNA. CXR today showed RLL opacities. I called nursing home who state patient is confused and hallucinates at her baseline. They state she appeared SOB earlier today which led to her getting a CXR. No reported fever, chest pain, abdominal pain, vomiting, urinary or bowel movement changes. No oxygen use at home.     In the ED patient afebrile and hemodynamically stable with hypoxia at rest improved on 2L NC. COVID positive. Ammonia elevated and UA concerning for UTI. Patient admitted to the care of medicine for futher evaluation and management.       Past Medical History:   Diagnosis Date    Addiction to drug     Alcohol abuse     Alcohol abuse, in remission 6/15/2015    14.5 weeks ago; AA weekly    Anemia     Anxiety 6/15/2015    Behavioral problem     Bipolar disorder     Bipolar  disorder in remission 6/15/2015    Cirrhosis, Laeendy's 6/15/2015    Depression     Encounter for blood transfusion     Epistaxis 6/15/2015    Fatigue     Glaucoma     Hematuria     Hepatic encephalopathy 6/15/2015    Hepatic enlargement     History of psychiatric care     History of psychiatric hospitalization     History of seizure 6/15/2015    1    hx of intentional Tylenol overdose 6/15/2015    2005- situational and hx of bipolar    Hx of psychiatric care     Macrocytic anemia 9/18/2015    6 units PRBC since June 2015    Jeana     Osteoarthritis     Other ascites 6/15/2015    Psychiatric exam requested by authority     Psychiatric problem     Psychosis 9/26/2019    Renal disorder     Seizures     Self-harming behavior     Suicide attempt     Therapy     Thrombocytopenia 6/15/2015       Past Surgical History:   Procedure Laterality Date    COSMETIC SURGERY      EMBOLIZATION N/A 6/11/2023    Procedure: EMBOLIZATION, BLOOD VESSEL;  Surgeon: Debbie Surgeon;  Location[facility];  Service: Anesthesiology;  Laterality: N/A;    ESOPHAGOGASTRODUODENOSCOPY      ESOPHAGOGASTRODUODENOSCOPY N/A 7/6/2023    Procedure: EGD (ESOPHAGOGASTRODUODENOSCOPY);  Surgeon: Bernice Guillen MD;  Location: [address]);  Service: Endoscopy;  Laterality: N/A;    ESOPHAGOGASTRODUODENOSCOPY N/A 7/11/2023    Procedure: EGD (ESOPHAGOGASTRODUODENOSCOPY);  Surgeon: Rangel Broussard MD;  Location: [address]);  Service: Endoscopy;  Laterality: N/A;       Review of patient's allergies indicates:   Allergen Reactions    Sulfa (sulfonamide antibiotics) Rash    Codeine Nausea And Vomiting       No current facility-administered medications on file prior to encounter.     Current Outpatient Medications on File Prior to Encounter   Medication Sig    ALPRAZolam (XANAX) 0.25 MG tablet Take 1 tablet (0.25 mg total) by mouth 2 (two) times daily as needed for Anxiety.    ferrous sulfate (FEOSOL) 325 mg (65 mg  iron) Tab tablet Take 325 mg by mouth once daily.    furosemide (LASIX) 20 MG tablet Take 20 mg by mouth once daily.    lactulose (CHRONULAC) 20 gram/30 mL Soln Take 45 mLs (30 g total) by mouth 3 (three) times daily. TITRATE TO 3-4 BOWEL MOVEMENTS DAILY.    levetiracetam 500 mg/5 mL (5 mL) Soln Take 10 mLs (1,000 mg total) by mouth 2 (two) times daily.    lithium (LITHOBID) 300 MG CR tablet Take 1 tablet (300 mg total) by mouth every evening.    OLANZapine (ZYPREXA) 5 MG tablet Take 1 tablet (5 mg total) by mouth every evening.    pantoprazole (PROTONIX) 40 mg suspension Take 1 packet (40 mg total) by mouth 2 (two) times daily.    rifAXIMin (XIFAXAN) 550 mg Tab Take 1 tablet (550 mg total) by mouth 2 (two) times daily.     Family History       Problem Relation (Age of Onset)    Alcohol abuse Father, Sister, Paternal Grandfather    Bipolar disorder Father    Hypertension Mother    Suicide Father          Tobacco Use    Smoking status: Never    Smokeless tobacco: Never   Substance and Sexual Activity    Alcohol use: Not Currently     Comment: hx of ETOH abuse with cirrhosis of liver    Drug use: No    Sexual activity: Not Currently     Review of Systems   Unable to perform ROS: Mental status change     Objective:     Vital Signs (Most Recent):  Temp: 98.7 °F (37.1 °C) (09/12/23 2240)  Pulse: 82 (09/13/23 0028)  Resp: 18 (09/13/23 0028)  BP: (!) 146/70 (09/12/23 2240)  SpO2: (!) 94 % (09/13/23 0028) Vital Signs (24h Range):  Temp:  [98.2 °F (36.8 °C)-98.7 °F (37.1 °C)] 98.7 °F (37.1 °C)  Pulse:  [70-94] 82  Resp:  [11-20] 18  SpO2:  [91 %-100 %] 94 %  BP: (131-167)/(52-84) 146/70     Weight: 57.4 kg (126 lb 8.7 oz)  Body mass index is 23.15 kg/m².     Physical Exam  Constitutional:       General: She is not in acute distress.     Appearance: She is ill-appearing and diaphoretic. She is not toxic-appearing.   HENT:      Head: Normocephalic and atraumatic.      Nose: Nose normal.   Eyes:      General:  Scleral icterus present.      Extraocular Movements: Extraocular movements intact.      Pupils: Pupils are equal, round, and reactive to light.   Cardiovascular:      Rate and Rhythm: Regular rhythm. Tachycardia present.   Pulmonary:      Effort: Pulmonary effort is normal. No respiratory distress.      Breath sounds: Rales present. No wheezing.   Abdominal:      General: Abdomen is flat. There is distension.      Palpations: Abdomen is soft.      Tenderness: There is no abdominal tenderness. There is no guarding.   Musculoskeletal:         General: Normal range of motion.      Cervical back: Normal range of motion and neck supple. No rigidity.      Right lower leg: Edema present.      Left lower leg: Edema present.   Skin:     General: Skin is warm.      Coloration: Skin is not jaundiced or pale.   Neurological:      General: No focal deficit present.      Mental Status: She is alert. She is disoriented.              CRANIAL NERVES     CN III, IV, VI   Pupils are equal, round, and reactive to light.       Significant Labs: All pertinent labs within the past 24 hours have been reviewed.  CBC:   Recent Labs   Lab 09/12/23  1843   WBC 3.35*   HGB 8.4*   HCT 25.7*   PLT 67*     CMP:   Recent Labs   Lab 09/12/23  1843      K 3.2*   *   CO2 18*   *   BUN 6   CREATININE 0.6   CALCIUM 8.0*   PROT 5.6*   ALBUMIN 1.7*   BILITOT 3.5*   ALKPHOS 143*   AST 39   ALT 14   ANIONGAP 9     Cardiac Markers:   Recent Labs   Lab 09/12/23  1843   BNP 90     Lactic Acid:   Recent Labs   Lab 09/12/23  2156   LACTATE 1.9     Urine Studies:   Recent Labs   Lab 09/12/23  2124   COLORU Yellow   APPEARANCEUA Hazy*   PHUR 7.0   SPECGRAV 1.010   PROTEINUA 1+*   GLUCUA Negative   KETONESU Negative   BILIRUBINUA Negative   OCCULTUA 2+*   NITRITE Negative   LEUKOCYTESUR 3+*   RBCUA 24*   WBCUA 95*   BACTERIA Few*   SQUAMEPITHEL 2   HYALINECASTS 0       Significant Imaging: I have reviewed all pertinent imaging results/findings  within the past 24 hours.    Assessment/Plan:     * Pneumonia due to COVID-19 virus  Patient is identified as Severe COVID-19 based on hypoxemia with O2 saturations <94% on room air or on ambulation   Initiate standard COVID protocols; COVID-19 testing Collection Date: 8/3/2023 Collection Time:  10:45 AM ,Infection Control notification  and isolation- respiratory, contact and droplet per protocol    Diagnostics: CBC, CMP, Procalcitonin, Ferritin, CRP, Blood Culture x2 and Portable CXR    Management: Initiate targeted therapy with Remdesivir, 200mg IV x1, followed by 100mg IV daily x5 days total and Dexamethasone PO/IV 6mg daily x10 days, Maintain oxygen saturations 92-96% via Nasal Cannula 2 LPM and monitor with continuous/intermittent pulse oximetry.  and Inhaled bronchodilators as needed for shortness of breath.    Advance Care Planning  Current advance care plan has not been discussed with patient/family/POA and patient currently wishes Full Code.    Acute cystitis  - ceftriaxone and azithro as above for COVID PNA with concern for superimposed infection  - follow up urine culture      Deep vein thrombosis (DVT) of femoral vein of right lower extremity  - bilateral lower extremity venous ultrasound on 08/15/2023= Chronic DVT of the right common femoral and saphenofemoral junction. Bilateral inguinal ascites  -IVC filter in place (patient had IVC filter placed on recent hospitalization )  -patient not a candidate for anticoagulation due to recurrent GI bleeding and severe thrombocytopenia    Bipolar 1 disorder, depressed, severe  - resume home lithium  - lithium level in am      Hepatic encephalopathy  - lactulose 30g TID  - rifaximin BID  - reportedly with baseline confusion. Alert to person. Intermittently aware of being in hospital.       Chronic liver failure  MELD 3.0: 21 at 9/12/2023  9:56 PM  MELD-Na: 17 at 9/12/2023  9:56 PM  Calculated from:  Serum Creatinine: 0.6 mg/dL (Using min of 1 mg/dL) at 9/12/2023   6:43 PM  Serum Sodium: 142 mmol/L (Using max of 137 mmol/L) at 9/12/2023  6:43 PM  Total Bilirubin: 3.5 mg/dL at 9/12/2023  6:43 PM  Serum Albumin: 1.7 g/dL at 9/12/2023  6:43 PM  INR(ratio): 1.7 at 9/12/2023  9:56 PM  Age at listing (hypothetical): 58 years  Sex: Female at 9/12/2023  9:56 PM          History of seizure  - continue home keppra        VTE Risk Mitigation (From admission, onward)    None                     Jamar Etienne MD  Department of Hospital Medicine  [facility] - Telemetry Stepdown

## 2023-09-13 NOTE — SUBJECTIVE & OBJECTIVE
Interval History: Pt admitted with COVID pneumonia    Past Medical History:   Diagnosis Date    Addiction to drug     Alcohol abuse     Alcohol abuse, in remission 6/15/2015    14.5 weeks ago; AA weekly    Anemia     Anxiety 6/15/2015    Behavioral problem     Bipolar disorder     Bipolar disorder in remission 6/15/2015    Cirrhosis, Laennec's 6/15/2015    Depression     Encounter for blood transfusion     Epistaxis 6/15/2015    Fatigue     Glaucoma     Hematuria     Hepatic encephalopathy 6/15/2015    Hepatic enlargement     History of psychiatric care     History of psychiatric hospitalization     History of seizure 6/15/2015    1    hx of intentional Tylenol overdose 6/15/2015    2005- situational and hx of bipolar    Hx of psychiatric care     Macrocytic anemia 9/18/2015    6 units PRBC since June 2015    Jeana     Osteoarthritis     Other ascites 6/15/2015    Psychiatric exam requested by authority     Psychiatric problem     Psychosis 9/26/2019    Renal disorder     Seizures     Self-harming behavior     Suicide attempt     Therapy     Thrombocytopenia 6/15/2015       Past Surgical History:   Procedure Laterality Date    COSMETIC SURGERY      EMBOLIZATION N/A 6/11/2023    Procedure: EMBOLIZATION, BLOOD VESSEL;  Surgeon: Debbie Surgeon;  Location: [facility];  Service: Anesthesiology;  Laterality: N/A;    ESOPHAGOGASTRODUODENOSCOPY      ESOPHAGOGASTRODUODENOSCOPY N/A 7/6/2023    Procedure: EGD (ESOPHAGOGASTRODUODENOSCOPY);  Surgeon: Bernice Guillen MD;  Location: [address]);  Service: Endoscopy;  Laterality: N/A;    ESOPHAGOGASTRODUODENOSCOPY N/A 7/11/2023    Procedure: EGD (ESOPHAGOGASTRODUODENOSCOPY);  Surgeon: Rangel Broussard MD;  Location: [address]);  Service: Endoscopy;  Laterality: N/A;       Review of patient's allergies indicates:   Allergen Reactions    Sulfa (sulfonamide antibiotics) Rash    Codeine Nausea And Vomiting       Medications:  Continuous Infusions:  Scheduled Meds:    albuterol  2 puff Inhalation Q6H    ascorbic acid (vitamin C)  500 mg Oral BID    azithromycin  500 mg Oral Q24H    cefTRIAXone (ROCEPHIN) IVPB  1 g Intravenous Q24H    dexAMETHasone  6 mg Oral Daily    furosemide  20 mg Oral Daily    lactulose  30 g Oral TID    levetiracetam  1,000 mg Oral BID    lithium  300 mg Oral QHS    multivitamin  1 tablet Oral Daily    pantoprazole  40 mg Oral BID    potassium bicarbonate  50 mEq Oral Q4H    [START ON 9/14/2023] remdesivir infusion  100 mg Intravenous Daily    rifAXIMin  550 mg Oral BID     PRN Meds:acetaminophen, dextromethorphan-guaiFENesin  mg/5 ml, dextrose 10%, dextrose 10%, glucagon (human recombinant), glucose, glucose, melatonin, naloxone, OLANZapine, ondansetron, ondansetron, prochlorperazine, sodium chloride 0.9%    Family History       Problem Relation (Age of Onset)    Alcohol abuse Father, Sister, Paternal Grandfather    Bipolar disorder Father    Hypertension Mother    Suicide Father          Tobacco Use    Smoking status: Never    Smokeless tobacco: Never   Substance and Sexual Activity    Alcohol use: Not Currently     Comment: hx of ETOH abuse with cirrhosis of liver    Drug use: No    Sexual activity: Not Currently       Review of Systems  Objective:     Vital Signs (Most Recent):  Temp: 98.2 °F (36.8 °C) (09/13/23 0850)  Pulse: 80 (09/13/23 0905)  Resp: 18 (09/13/23 0905)  BP: 130/62 (09/13/23 0850)  SpO2: 95 % (09/13/23 0905) Vital Signs (24h Range):  Temp:  [98 °F (36.7 °C)-98.7 °F (37.1 °C)] 98.2 °F (36.8 °C)  Pulse:  [70-94] 80  Resp:  [11-20] 18  SpO2:  [91 %-100 %] 95 %  BP: (125-167)/(52-84) 130/62     Weight: 57.4 kg (126 lb 8.7 oz)  Body mass index is 23.15 kg/m².       Physical Exam  Constitutional:       General: She is not in acute distress.     Appearance: She is normal weight.   HENT:      Head: Normocephalic and atraumatic.   Pulmonary:      Effort: Pulmonary effort is normal. No respiratory distress.   Musculoskeletal:      Cervical  back: Normal range of motion.      Comments: Moving extremities   Skin:     General: Skin is warm and dry.   Neurological:      Mental Status: She is alert.      Comments: Oriented to person, place   Psychiatric:         Attention and Perception: Attention normal.         Speech: Speech normal.         Behavior: Behavior is cooperative.            Review of Symptoms      Symptom Assessment (ESAS 0-10 Scale)  Pain:  0  Dyspnea:  0  Anxiety:  0  Nausea:  0  Depression:  0  Anorexia:  0  Fatigue:  0  Insomnia:  0  Restlessness:  0  Agitation:  0         Performance Status:  40    Living Arrangements:  Lives in nursing home    Psychosocial/Cultural:   See Palliative Psychosocial Note: No  Pt is in SNF at [facility]. Pt has two sisters, Zaria and Bridget. Zaria lives in [place] and Bridget lives in [place]. Pt's mother is .   **Primary  to Follow**  Palliative Care  Consult: No        Advance Care Planning   Advance Directives:   Living Will: No    Do Not Resuscitate Status: No    Medical Power of : No    Agent's Name:  Pt's sisters are NOK. Call Zaria first.    Decision Making:  Patient answered questions and Family answered questions  Goals of Care: The patient and family endorses that what is most important right now is to focus on continuing medical management.            Significant Labs: All pertinent labs within the past 24 hours have been reviewed.  CBC:   Recent Labs   Lab 23  0642   WBC 2.60*  2.60*   HGB 7.8*  7.8*   HCT 24.2*  24.2*   *  109*   PLT 58*  58*     BMP:  Recent Labs   Lab 23  1843 23  0642   * 101    143   K 3.2* 2.9*   * 115*   CO2 18* 20*   BUN 6 6   CREATININE 0.6 0.5   CALCIUM 8.0* 7.6*   MG 1.6  --      LFT:  Lab Results   Component Value Date    AST 38 2023    GGT 33 2023    ALKPHOS 128 2023    BILITOT 3.3 (H) 2023     Albumin:   Albumin   Date Value Ref Range  Status   09/13/2023 1.7 (L) 3.5 - 5.2 g/dL Final     Protein:   Total Protein   Date Value Ref Range Status   09/13/2023 5.3 (L) 6.0 - 8.4 g/dL Final     Lactic acid:   Lab Results   Component Value Date    LACTATE 1.9 09/12/2023    LACTATE 3.1 (H) 08/15/2023       Significant Imaging: I have reviewed all pertinent imaging results/findings within the past 24 hours.

## 2023-09-13 NOTE — CONSULTS
[facility] - Telemetry Stepdown  Adult Nutrition  Consult Note    SUMMARY     Recommendations    1. Liberalize diet to Low Na & add Boost Plus ONS TID.  2. RD to monitor & follow-up.    Goals: Meet % EEN, EPN by RD f/u date  Nutrition Goal Status: new  Communication of RD Recs: reviewed with RN    Assessment and Plan    Severe protein-calorie malnutrition     Nutrition Problem:  Severe Protein-Calorie Malnutrition  Malnutrition in the context of Social/Environmental Circumstances     Related to (etiology):  Inability to consume sufficient energy     Signs and Symptoms (as evidenced by):  Body Fat Depletion: moderate and severe depletion of orbitals and triceps   Muscle Mass Depletion: moderate and severe depletion of temples, clavicle region and scapular region   Weight Loss: 8% x 2-3 months     Interventions(treatment strategy):  Collaboration of nutrition care w/ other providers  ONS     Nutrition Diagnosis Status:  New/Continues    Malnutrition Assessment    Malnutrition Context: social/environmental circumstances  Malnutrition Level: severe     Weight Loss (Malnutrition): greater than 7.5% in 3 months  Subcutaneous Fat (Malnutrition): severe depletion  Muscle Mass (Malnutrition): severe depletion     Reason for Assessment    Reason For Assessment: consult  Diagnosis: other (see comments) (PNA 2/2 COVID-19)  Relevant Medical History: Etoh cirrhosis  Interdisciplinary Rounds: did not attend    General Information Comments: Presents from NH - Information obtained from RD assessments in July/August; pt disoriented. Pt diagnosed w/ severe malnutrition 2/2 physical exam & weight loss (weight ranges from 103-135# in 2023). RD feels this diagnosis continues - please see PES statement for details. Visual NFPE complete 9/2. Pt also w/ +3 edema. Noted palliative care now following.  Nutrition Discharge Planning: Adequate PO intake    Nutrition/Diet History    Factors Affecting Nutritional Intake: decreased  "appetite    Anthropometrics    Temp: 98.2 °F (36.8 °C)  Height Method: Stated  Height: 5' 2" (157.5 cm)  Height (inches): 62 in  Weight Method: Bed Scale  Weight: 57.4 kg (126 lb 8.7 oz)  Weight (lb): 126.55 lb  Ideal Body Weight (IBW), Female: 110 lb  % Ideal Body Weight, Female (lb): 115.05 %  BMI (Calculated): 23.1  BMI Grade: 18.5-24.9 - normal    Lab/Procedures/Meds    Pertinent Labs Reviewed: reviewed  Pertinent Medications Reviewed: reviewed  Pertinent Medications Comments: MVI    Estimated/Assessed Needs    Weight Used For Calorie Calculations: 57.4 kg (126 lb 8.7 oz)    Energy Calorie Requirements (kcal): 1722 kcal/d  Energy Need Method: Kcal/kg (30 kcal/kg)    Protein Requirements: 75 g/d (1.3 g/kg)  Weight Used For Protein Calculations: 57.4 kg (126 lb 8.7 oz)    Estimated Fluid Requirement Method: other (see comments) (Per MD or 1 mL/kcal)  RDA Method (mL): 1722    Nutrition Prescription Ordered    Current Diet Order: Cardiac    Evaluation of Received Nutrient/Fluid Intake    I/O: -    Comments: LBM: 9/12    Tolerance: tolerating    Nutrition Risk    Level of Risk/Frequency of Follow-up:  (1x/week)     Monitor and Evaluation    Food and Nutrient Intake: food and beverage intake, energy intake  Food and Nutrient Adminstration: diet order  Physical Activity and Function: nutrition-related ADLs and IADLs  Anthropometric Measurements: weight, weight change  Biochemical Data, Medical Tests and Procedures: inflammatory profile, lipid profile, glucose/endocrine profile, gastrointestinal profile, electrolyte and renal panel     Nutrition Follow-Up    RD Follow-up?: Yes    "

## 2023-09-13 NOTE — ASSESSMENT & PLAN NOTE
- ceftriaxone and azithro as above for COVID PNA with concern for superimposed infection  - follow up urine culture

## 2023-09-13 NOTE — PLAN OF CARE
[facility] - Telemetry Stepdown  Initial Discharge Assessment       Primary Care Provider: Viktor Ross MD    Admission Diagnosis: Metabolic encephalopathy [G93.41]  SOB (shortness of breath) [R06.02]  COVID-19 virus infection [U07.1]    Admission Date: 9/12/2023  Expected Discharge Date: 9/18/2023    Transition of Care Barriers: None    Payor: HUMANA MANAGED MEDICARE / Plan: HUMANA MEDICARE HMO / Product Type: Capitation /     Extended Emergency Contact Information  Primary Emergency Contact: Zaria Hamilton DC [facility]  Home Phone: [phone]  Relation: Sister  Secondary Emergency Contact: Bridget Hamilton           [place], [place]  Home Phone: [phone]  Relation: Sister    Discharge Plan A: Skilled Nursing Facility  Discharge Plan B: New Nursing Home placement - snf care facility      [facility] - [place] - [address]  [address]  [place] [address]  Phone: [phone] Fax: [phone]      Initial Assessment (most recent)       Adult Discharge Assessment - 09/13/23 1310          Discharge Assessment    Assessment Type Discharge Planning Assessment     Source of Information family;health record     Reason For Admission Covid (+)     People in Home --   [facility] (SNF)    Do you have help at home or someone to help you manage your care at home? Yes     Prior to hospitilization cognitive status: --   Confused at times    Current cognitive status: Unable to Assess     Walking or Climbing Stairs ambulation difficulty, requires equipment     Equipment Currently Used at Home walker, rolling     Readmission within 30 days? Yes     Patient currently being followed by outpatient case management? No     Do you currently have service(s) that help you manage your care at home? No     Do you take prescription medications? Yes     Do you have prescription coverage? Yes     Do you have any problems affording any of your prescribed  medications? No     Is the patient taking medications as prescribed? yes     Are you on dialysis? No     Do you take coumadin? No     DME Needed Upon Discharge  other (see comments)   TBD    Transition of Care Barriers None     Discharge Plan A Skilled Nursing Facility     Discharge Plan B New Nursing Home placement - shelter care facility                   Jyoti Jones RN  Ext [phone]

## 2023-09-13 NOTE — ASSESSMENT & PLAN NOTE
- bilateral lower extremity venous ultrasound on 08/15/2023= Chronic DVT of the right common femoral and saphenofemoral junction. Bilateral inguinal ascites  -IVC filter in place (patient had IVC filter placed on recent hospitalization )  -patient not a candidate for anticoagulation due to recurrent GI bleeding and severe thrombocytopenia   0

## 2023-09-13 NOTE — HPI
Marely Hamilton is a 58 y.o. female with medical history of ETOH Cirrhosis, Hepatic encephalopathy, Recurrent GI bleed, Chronic LE DVT s/p IVC filter, E coli bacteremia presenting to the ED with the chief complaint of shortness of breath. Patient with recent admission 08/31 to 09/07 for hepatic encephalopathy and UTI. Discharged in stable condition to NH facility where she presents from today.      Limited history from patient 2/2 confusion. Presents via EMS from [facility] for concerns of PNA. CXR today showed RLL opacities. I called nursing home who state patient is confused and hallucinates at her baseline. They state she appeared SOB earlier today which led to her getting a CXR. No reported fever, chest pain, abdominal pain, vomiting, urinary or bowel movement changes. No oxygen use at home.     In the ED patient afebrile and hemodynamically stable with hypoxia at rest improved on 2L NC. COVID positive. Ammonia elevated and UA concerning for UTI. Patient admitted to the care of medicine for futher evaluation and management.

## 2023-09-13 NOTE — ASSESSMENT & PLAN NOTE
Patient is identified as Severe COVID-19 based on hypoxemia with O2 saturations <94% on room air or on ambulation   Initiate standard COVID protocols; COVID-19 testing Collection Date: 8/3/2023 Collection Time:  10:45 AM ,Infection Control notification  and isolation- respiratory, contact and droplet per protocol    Diagnostics: CBC, CMP, Procalcitonin, Ferritin, CRP, Blood Culture x2 and Portable CXR    Management: Initiate targeted therapy with Remdesivir, 200mg IV x1, followed by 100mg IV daily x5 days total and Dexamethasone PO/IV 6mg daily x10 days, Maintain oxygen saturations 92-96% via Nasal Cannula 2 LPM and monitor with continuous/intermittent pulse oximetry.  and Inhaled bronchodilators as needed for shortness of breath.    Advance Care Planning  Current advance care plan has not been discussed with patient/family/POA and patient currently wishes Full Code.

## 2023-09-13 NOTE — NURSING
Nurses Note -- 4 Eyes      9/12/2023   11:52 PM      Skin assessed during: Admit      [] No Altered Skin Integrity Present    []Prevention Measures Documented      [x] Yes- Altered Skin Integrity Present or Discovered   [] LDA Added if Not in Epic (Describe Wound)   [x] New Altered Skin Integrity was Present on Admit and Documented in LDA   [x] Wound Image Taken    Wound Care Consulted? Yes    Attending Nurse:  Billy Rangel RN/Staff Member:  Deloris

## 2023-09-13 NOTE — ASSESSMENT & PLAN NOTE
- lactulose 30g TID  - rifaximin BID  - reportedly with baseline confusion. Alert to person. Intermittently aware of being in hospital.

## 2023-09-13 NOTE — CONSULTS
[facility] - Telemetry Stepdown  Wound Care    Patient Name:  Marely Hamilton   MRN:  [id number]  Date: 9/13/2023  Diagnosis: Pneumonia due to COVID-19 virus    History:     Past Medical History:   Diagnosis Date    Addiction to drug     Alcohol abuse     Alcohol abuse, in remission 6/15/2015    14.5 weeks ago; AA weekly    Anemia     Anxiety 6/15/2015    Behavioral problem     Bipolar disorder     Bipolar disorder in remission 6/15/2015    Cirrhosis, Laennec's 6/15/2015    Depression     Encounter for blood transfusion     Epistaxis 6/15/2015    Fatigue     Glaucoma     Hematuria     Hepatic encephalopathy 6/15/2015    Hepatic enlargement     History of psychiatric care     History of psychiatric hospitalization     History of seizure 6/15/2015    1    hx of intentional Tylenol overdose 6/15/2015    2005- situational and hx of bipolar    Hx of psychiatric care     Macrocytic anemia 9/18/2015    6 units PRBC since June 2015    Jeana     Osteoarthritis     Other ascites 6/15/2015    Psychiatric exam requested by authority     Psychiatric problem     Psychosis 9/26/2019    Renal disorder     Seizures     Self-harming behavior     Suicide attempt     Therapy     Thrombocytopenia 6/15/2015       Social History     Socioeconomic History    Marital status: Single   Occupational History    Occupation: Disabled     Employer: DISABLED   Tobacco Use    Smoking status: Never    Smokeless tobacco: Never   Substance and Sexual Activity    Alcohol use: Not Currently     Comment: hx of ETOH abuse with cirrhosis of liver    Drug use: No    Sexual activity: Not Currently   Other Topics Concern    Patient feels they ought to cut down on drinking/drug use No    Patient annoyed by others criticizing their drinking/drug use No    Patient has felt bad or guilty about drinking/drug use No    Patient has had a drink/used drugs as an eye opener in the AM No   Social History Narrative    Pt has 1 older and 1 younger sister.  Her father committed  suicide when she was 18.  She has a [employer] degree and worked as an , but she has been disabled since age 40.  She never  and has no children.  She is not dating and claims no current friends.  She currently lives with her mother along with her pet dog.  She denies any hobbies and says she is not Gnosticist.     Social Determinants of Health     Financial Resource Strain: Low Risk  (8/15/2023)    Overall Financial Resource Strain (CARDIA)     Difficulty of Paying Living Expenses: Not hard at all   Food Insecurity: No Food Insecurity (8/15/2023)    Hunger Vital Sign     Worried About Running Out of Food in the Last Year: Never true     Ran Out of Food in the Last Year: Never true   Transportation Needs: No Transportation Needs (8/15/2023)    PRAPARE - Transportation     Lack of Transportation (Medical): No     Lack of Transportation (Non-Medical): No   Physical Activity: Inactive (8/15/2023)    Exercise Vital Sign     Days of Exercise per Week: 0 days     Minutes of Exercise per Session: 0 min   Stress: Stress Concern Present (8/15/2023)    Citizen of [place] of Occupational Health - Occupational Stress Questionnaire     Feeling of Stress : Very much   Social Connections: Unknown (8/15/2023)    Social Connection and Isolation Panel [NHANES]     Frequency of Communication with Friends and Family: Patient refused     Frequency of Social Gatherings with Friends and Family: Patient refused     Attends Taoist Services: Never     Active Member of Clubs or Organizations: No     Attends Club or Organization Meetings: Never     Marital Status: Never    Housing Stability: Low Risk  (8/15/2023)    Housing Stability Vital Sign     Unable to Pay for Housing in the Last Year: No     Number of Places Lived in the Last Year: 1     Unstable Housing in the Last Year: No       Precautions:     Allergies as of 09/12/2023 - Reviewed 09/12/2023   Allergen Reaction Noted    Sulfa (sulfonamide antibiotics) Rash 11/26/2014     Codeine Nausea And Vomiting 04/26/2018       [facility] Assessment Details/Treatment   Patient seen for wound care consultation.   Reviewed chart for this encounter.   See Flow Sheet for findings.    Patient laying in bed and agreeable to assessment. The sacrum, buttocks and lower back are dry with scattered red petechiae with fungal appearance. The perineum is excoriated, pink and moist. Patient has a urinary cathter and is incontinent of stool. She is unable to change positions in the bed independently.      RECOMMENDATIONS:  - Sacrum, buttocks and perineum: bedside nursing to cleanse with soap and water, pat dry and apply antifungal (miconazole) ointment BID and PRN; no dressings nor briefs  - Turning every 2 hours  - Heel protecting boots  - Waffle mattress overlay   - Nursing to maintain pressure injury prevention interventions      09/13/23 1130        Altered Skin Integrity 06/03/23 1300  Groin Moisture associated dermatitis   Date First Assessed/Time First Assessed: 06/03/23 1300   Altered Skin Integrity Present on Admission - Did Patient arrive to the hospital with altered skin?: yes  Side: (c)   Location: Groin  Is this injury device related?: No  Primary Wound Type: Bobby...   Dressing Appearance Open to air   Drainage Amount None   Appearance [place];Moist   Periwound Area Intact;Moist   Periwound Care Moisture barrier applied        Altered Skin Integrity 06/29/23 1523 Sacral spine Moisture associated dermatitis   Date First Assessed/Time First Assessed: 06/29/23 1523   Altered Skin Integrity Present on Admission - Did Patient arrive to the hospital with altered skin?: suspected hospital acquired  Location: Sacral spine  Primary Wound Type: Moisture associated ...   Wound Image    Dressing Appearance Open to air   Drainage Amount Scant   Drainage Characteristics/Odor Serosanguineous   Appearance Pink;Red;Other (see comments);Dry  (fungal apperance)   Periwound Area Intact;Dry   Care Cleansed with:;Soap and water        Recommendations made to primary team for above plan via secure chat. Orders placed. Wound care will follow-up as needed.     09/13/2023

## 2023-09-13 NOTE — CONSULTS
[facility] - Telemetry Stepdown  Palliative Medicine  Consult Note    Patient Name: Marely Hamilton  MRN: [id number]  Admission Date: 9/12/2023  Hospital Length of Stay: 1 days  Code Status: Full Code   Attending Provider: Lenka Ortiz MD  Consulting Provider: MIKA Urban  Primary Care Physician: Viktor Ross MD  Principal Problem:Pneumonia due to COVID-19 virus    Patient information was obtained from patient and primary team.      Inpatient consult to Palliative Care  Consult performed by: Lety Aceves CNS  Consult ordered by: Lenka Ortiz MD        Assessment/Plan:     Palliative Care  Palliative care encounter  Impression: Pt is a 57 y/o female admitted with COVID/PNA. Pt has hx of ETOH Cirrhosis, Hepatic encephalopathy, Recurrent GI bleed, Chronic LE DVT s/p IVC filter, E coli bacteremia presenting to the ED with the chief complaint of shortness of breath. Pt is alert, oriented to person, place, situation somewhat. No SOB noted. No distress noted. Pt is Full code.     Reason for consult:  ACP, communicated with Dr. Ortiz.     Goals of care/ACP: Met with pt. No family in room. Pt is alert and able to answer questions. Pt reports she is living at [facility] at this time. Pt reports she uses a wheelchair. Per pt her sister, Zaria lives in [place] and her sister, Michaela lives in [place]. Per pt, please call Zaria first. Pt is aware of her liver issues and that she was admitted for PNA. Pt reports she makes sure to take her lithium everyday at 600 and has been abstinent from alcohol for 8 years.   Called and spoke to pt's sister, Zaria who confirmed above info. Per Zaria, pt was in SNF at [facility]. Per Zaria, she was hopeful pt would be able to live in assisted living but is aware of her decline and need for 24 hour care. Plan is to take pt back to [place] by plane when pt stable and able to make flight. Goal at this time is to continue medical management.      Code status discussed. Pt to remain full code. Sister is aware that code status can be changed if pt starts to decline further.     Symptom management:     Bipolar disease:   Pt on Lithium.   Pt reports she takes meds everyday at 6pm.     Pt has no complaints of SOB, pain, nausea.     Debility:   PT/OT consulted to evaluate and treat.   Pt was at [facility] in SNF.     Plan:   Goal is for pt to move to [place] where sister Zaria live. Zaria is aware pt needs 24 hour care.         Thank you for your consult. I will follow-up with patient. Please contact us if you have any additional questions.    Subjective:     HPI:   Pt is a 58 y.o. female with medical history of ETOH Cirrhosis, Hepatic encephalopathy, Recurrent GI bleed, Chronic LE DVT s/p IVC filter, E coli bacteremia presenting to the ED with the chief complaint of shortness of breath. Patient with recent admission 08/31 to 09/07 for hepatic encephalopathy and UTI. Discharged in stable condition to NH facility where she presented from today.      Per cahrt review, limited history from patient 2/2 confusion. Presented via EMS from [facility] for concerns of PNA. CXR today showed RLL opacities. I called nursing home who stated patient is confused and hallucinates at her baseline. They state she appeared SOB earlier today which led to her getting a CXR. No reported fever, chest pain, abdominal pain, vomiting, urinary or bowel movement changes. No oxygen use at home.      In the ED patient afebrile and hemodynamically stable with hypoxia at rest improved on 2L NC. COVID positive. Ammonia elevated and UA concerning for UTI. Patient admitted to the care of medicine for futher evaluation and management.          Hospital Course:  No notes on file    Interval History: Pt admitted with COVID pneumonia    Past Medical History:   Diagnosis Date    Addiction to drug     Alcohol abuse     Alcohol abuse, in remission 6/15/2015    14.5 weeks ago; AA weekly     Anemia     Anxiety 6/15/2015    Behavioral problem     Bipolar disorder     Bipolar disorder in remission 6/15/2015    Cirrhosis, Laennec's 6/15/2015    Depression     Encounter for blood transfusion     Epistaxis 6/15/2015    Fatigue     Glaucoma     Hematuria     Hepatic encephalopathy 6/15/2015    Hepatic enlargement     History of psychiatric care     History of psychiatric hospitalization     History of seizure 6/15/2015    1    hx of intentional Tylenol overdose 6/15/2015    2005- situational and hx of bipolar    Hx of psychiatric care     Macrocytic anemia 9/18/2015    6 units PRBC since June 2015    Jeana     Osteoarthritis     Other ascites 6/15/2015    Psychiatric exam requested by authority     Psychiatric problem     Psychosis 9/26/2019    Renal disorder     Seizures     Self-harming behavior     Suicide attempt     Therapy     Thrombocytopenia 6/15/2015       Past Surgical History:   Procedure Laterality Date    COSMETIC SURGERY      EMBOLIZATION N/A 6/11/2023    Procedure: EMBOLIZATION, BLOOD VESSEL;  Surgeon: Debbie Surgeon;  Location: [facility];  Service: Anesthesiology;  Laterality: N/A;    ESOPHAGOGASTRODUODENOSCOPY      ESOPHAGOGASTRODUODENOSCOPY N/A 7/6/2023    Procedure: EGD (ESOPHAGOGASTRODUODENOSCOPY);  Surgeon: Bernice Guillen MD;  Location: [facility] ([address] [place] [address]);  Service: Endoscopy;  Laterality: N/A;    ESOPHAGOGASTRODUODENOSCOPY N/A 7/11/2023    Procedure: EGD (ESOPHAGOGASTRODUODENOSCOPY);  Surgeon: Rangel Broussard MD;  Location: [address]);  Service: Endoscopy;  Laterality: N/A;       Review of patient's allergies indicates:   Allergen Reactions    Sulfa (sulfonamide antibiotics) Rash    Codeine Nausea And Vomiting       Medications:  Continuous Infusions:  Scheduled Meds:   albuterol  2 puff Inhalation Q6H    ascorbic acid (vitamin C)  500 mg Oral BID    azithromycin  500 mg Oral Q24H    cefTRIAXone (ROCEPHIN) IVPB  1 g Intravenous  Q24H    dexAMETHasone  6 mg Oral Daily    furosemide  20 mg Oral Daily    lactulose  30 g Oral TID    levetiracetam  1,000 mg Oral BID    lithium  300 mg Oral QHS    multivitamin  1 tablet Oral Daily    pantoprazole  40 mg Oral BID    potassium bicarbonate  50 mEq Oral Q4H    [START ON 9/14/2023] remdesivir infusion  100 mg Intravenous Daily    rifAXIMin  550 mg Oral BID     PRN Meds:acetaminophen, dextromethorphan-guaiFENesin  mg/5 ml, dextrose 10%, dextrose 10%, glucagon (human recombinant), glucose, glucose, melatonin, naloxone, OLANZapine, ondansetron, ondansetron, prochlorperazine, sodium chloride 0.9%    Family History       Problem Relation (Age of Onset)    Alcohol abuse Father, Sister, Paternal Grandfather    Bipolar disorder Father    Hypertension Mother    Suicide Father          Tobacco Use    Smoking status: Never    Smokeless tobacco: Never   Substance and Sexual Activity    Alcohol use: Not Currently     Comment: hx of ETOH abuse with cirrhosis of liver    Drug use: No    Sexual activity: Not Currently       Review of Systems  Objective:     Vital Signs (Most Recent):  Temp: 98.2 °F (36.8 °C) (09/13/23 0850)  Pulse: 80 (09/13/23 0905)  Resp: 18 (09/13/23 0905)  BP: 130/62 (09/13/23 0850)  SpO2: 95 % (09/13/23 0905) Vital Signs (24h Range):  Temp:  [98 °F (36.7 °C)-98.7 °F (37.1 °C)] 98.2 °F (36.8 °C)  Pulse:  [70-94] 80  Resp:  [11-20] 18  SpO2:  [91 %-100 %] 95 %  BP: (125-167)/(52-84) 130/62     Weight: 57.4 kg (126 lb 8.7 oz)  Body mass index is 23.15 kg/m².       Physical Exam  Constitutional:       General: She is not in acute distress.     Appearance: She is normal weight.   HENT:      Head: Normocephalic and atraumatic.   Pulmonary:      Effort: Pulmonary effort is normal. No respiratory distress.   Musculoskeletal:      Cervical back: Normal range of motion.      Comments: Moving extremities   Skin:     General: Skin is warm and dry.   Neurological:      Mental Status: She  is alert.      Comments: Oriented to person, place   Psychiatric:         Attention and Perception: Attention normal.         Speech: Speech normal.         Behavior: Behavior is cooperative.            Review of Symptoms      Symptom Assessment (ESAS 0-10 Scale)  Pain:  0  Dyspnea:  0  Anxiety:  0  Nausea:  0  Depression:  0  Anorexia:  0  Fatigue:  0  Insomnia:  0  Restlessness:  0  Agitation:  0         Performance Status:  40    Living Arrangements:  Lives in nursing home    Psychosocial/Cultural:   See Palliative Psychosocial Note: No  Pt is in SNF at [facility]. Pt has two sisters, Zaria and Bridget. Zaria lives in [place] and Bridget lives in [place]. Pt's mother is .   **Primary  to Follow**  Palliative Care  Consult: No        Advance Care Planning  Advance Directives:   Living Will: No    Do Not Resuscitate Status: No    Medical Power of : No    Agent's Name:  Pt's sisters are NOK. Call Zaria first.    Decision Making:  Patient answered questions and Family answered questions  Goals of Care: The patient and family endorses that what is most important right now is to focus on continuing medical management.            Significant Labs: All pertinent labs within the past 24 hours have been reviewed.  CBC:   Recent Labs   Lab 23  0642   WBC 2.60*  2.60*   HGB 7.8*  7.8*   HCT 24.2*  24.2*   *  109*   PLT 58*  58*     BMP:  Recent Labs   Lab 23  1843 23  0642   * 101    143   K 3.2* 2.9*   * 115*   CO2 18* 20*   BUN 6 6   CREATININE 0.6 0.5   CALCIUM 8.0* 7.6*   MG 1.6  --      LFT:  Lab Results   Component Value Date    AST 38 2023    GGT 33 2023    ALKPHOS 128 2023    BILITOT 3.3 (H) 2023     Albumin:   Albumin   Date Value Ref Range Status   2023 1.7 (L) 3.5 - 5.2 g/dL Final     Protein:   Total Protein   Date Value Ref Range Status   2023 5.3 (L) 6.0 - 8.4 g/dL Final      Lactic acid:   Lab Results   Component Value Date    LACTATE 1.9 09/12/2023    LACTATE 3.1 (H) 08/15/2023       Significant Imaging: I have reviewed all pertinent imaging results/findings within the past 24 hours.        I spent a total of 75 minutes on the day of the visit. This includes face to face time in discussion of goals of care, symptom assessment, coordination of care and emotional support.  This also includes non-face to face time preparing to see the patient (eg, review of tests/imaging), obtaining and/or reviewing separately obtained history, documenting clinical information in the electronic or other health record, independently interpreting results and communicating results to the patient/family/caregiver, or care coordinator.    Lety Aceves, CNS  Palliative Medicine  [facility] - Telemetry Stepdown

## 2023-09-13 NOTE — PLAN OF CARE
Problem: Adult Inpatient Plan of Care  Goal: Plan of Care Review  Outcome: Ongoing, Progressing  Goal: Patient-Specific Goal (Individualized)  Outcome: Ongoing, Progressing  Goal: Absence of Hospital-Acquired Illness or Injury  Outcome: Ongoing, Progressing  Goal: Optimal Comfort and Wellbeing  Outcome: Ongoing, Progressing  Goal: Readiness for Transition of Care  Outcome: Ongoing, Progressing     Problem: Infection  Goal: Absence of Infection Signs and Symptoms  Outcome: Ongoing, Progressing     Problem: Adjustment to Illness (Sepsis/Septic Shock)  Goal: Optimal Coping  Outcome: Ongoing, Progressing     Problem: Bleeding (Sepsis/Septic Shock)  Goal: Absence of Bleeding  Outcome: Ongoing, Progressing     Problem: Glycemic Control Impaired (Sepsis/Septic Shock)  Goal: Blood Glucose Level Within Desired Range  Outcome: Ongoing, Progressing     Problem: Infection Progression (Sepsis/Septic Shock)  Goal: Absence of Infection Signs and Symptoms  Outcome: Ongoing, Progressing     Problem: Nutrition Impaired (Sepsis/Septic Shock)  Goal: Optimal Nutrition Intake  Outcome: Ongoing, Progressing     Problem: Fluid Imbalance (Pneumonia)  Goal: Fluid Balance  Outcome: Ongoing, Progressing     Problem: Infection (Pneumonia)  Goal: Resolution of Infection Signs and Symptoms  Outcome: Ongoing, Progressing     Problem: Respiratory Compromise (Pneumonia)  Goal: Effective Oxygenation and Ventilation  Outcome: Ongoing, Progressing     Problem: Impaired Wound Healing  Goal: Optimal Wound Healing  Outcome: Ongoing, Progressing     Problem: Skin Injury Risk Increased  Goal: Skin Health and Integrity  Outcome: Ongoing, Progressing     Problem: Coping Ineffective  Goal: Effective Coping  Outcome: Ongoing, Progressing

## 2023-09-13 NOTE — PLAN OF CARE
Problem: Adult Inpatient Plan of Care  Goal: Plan of Care Review  Outcome: Ongoing, Progressing  Goal: Patient-Specific Goal (Individualized)  Outcome: Ongoing, Progressing  Goal: Absence of Hospital-Acquired Illness or Injury  9/13/2023 0533 by Billy Merrill RN  Outcome: Ongoing, Progressing  9/13/2023 0532 by Billy Merrill RN  Outcome: Ongoing, Progressing  Goal: Optimal Comfort and Wellbeing  Outcome: Ongoing, Progressing     Problem: Adult Inpatient Plan of Care  Goal: Plan of Care Review  Outcome: Ongoing, Progressing  Goal: Patient-Specific Goal (Individualized)  Outcome: Ongoing, Progressing  Goal: Absence of Hospital-Acquired Illness or Injury  9/13/2023 0533 by Billy Merrill RN  Outcome: Ongoing, Progressing  9/13/2023 0532 by Billy Merrill RN  Outcome: Ongoing, Progressing  Goal: Optimal Comfort and Wellbeing  Outcome: Ongoing, Progressing  Pt remained awake all night. VS WNL. No ss of pain or distress. Call light within reach. Bed in low position. Bed alarm on. Saldaña care completed. Will continue to monitor and pass on care to oncoming nurse.

## 2023-09-13 NOTE — ASSESSMENT & PLAN NOTE
Impression: Pt is a 59 y/o female admitted with COVID/PNA. Pt has hx of ETOH Cirrhosis, Hepatic encephalopathy, Recurrent GI bleed, Chronic LE DVT s/p IVC filter, E coli bacteremia presenting to the ED with the chief complaint of shortness of breath. Pt is alert, oriented to person, place, situation somewhat. No SOB noted. No distress noted. Pt is Full code.     Reason for consult:  ACP, communicated with Dr. Ortiz.     Goals of care/ACP: Met with pt. No family in room. Pt is alert and able to answer questions. Pt reports she is living at [facility] at this time. Pt reports she uses a wheelchair. Per pt her sister, Zaria lives in [place] and her sister, Michaela lives in [place]. Per pt, please call Zaria first. Pt is aware of her liver issues and that she was admitted for PNA. Pt reports she makes sure to take her lithium everyday at 600 and has been abstinent from alcohol for 8 years.   Called and spoke to pt's sister, Zaria who confirmed above info. Per Zaria, pt was in SNF at [facility]. Per Zaria, she was hopeful pt would be able to live in assisted living but is aware of her decline and need for 24 hour care. Plan is to take pt back to [place] by plane when pt stable and able to make flight. Goal at this time is to continue medical management.     Code status discussed. Pt to remain full code. Sister is aware that code status can be changed if pt starts to decline further.     Symptom management:     Bipolar disease:   Pt on Lithium.   Pt reports she takes meds everyday at 6pm.     Pt has no complaints of SOB, pain, nausea.     Debility:   PT/OT consulted to evaluate and treat.   Pt was at [facility] in SNF.     Plan:   Goal is for pt to move to [place] where sister Zaria live. Zaria is aware pt needs 24 hour care.

## 2023-09-13 NOTE — HPI
Pt is a 58 y.o. female with medical history of ETOH Cirrhosis, Hepatic encephalopathy, Recurrent GI bleed, Chronic LE DVT s/p IVC filter, E coli bacteremia presenting to the ED with the chief complaint of shortness of breath. Patient with recent admission 08/31 to 09/07 for hepatic encephalopathy and UTI. Discharged in stable condition to NH facility where she presented from today.      Per cahrt review, limited history from patient 2/2 confusion. Presented via EMS from [facility] for concerns of PNA. CXR today showed RLL opacities. I called nursing home who stated patient is confused and hallucinates at her baseline. They state she appeared SOB earlier today which led to her getting a CXR. No reported fever, chest pain, abdominal pain, vomiting, urinary or bowel movement changes. No oxygen use at home.      In the ED patient afebrile and hemodynamically stable with hypoxia at rest improved on 2L NC. COVID positive. Ammonia elevated and UA concerning for UTI. Patient admitted to the care of medicine for futher evaluation and management.

## 2023-09-13 NOTE — PLAN OF CARE
Recommendations     1. Liberalize diet to Low Na & add Boost Plus ONS TID.  2. RD to monitor & follow-up.     Goals: Meet % EEN, EPN by RD f/u date  Nutrition Goal Status: new  Communication of RD Recs: reviewed with RN

## 2023-09-13 NOTE — PLAN OF CARE
Problem: Physical Therapy  Goal: Physical Therapy Goal  Description: Goals to be met by: 10/13/23     Patient will increase functional independence with mobility by performin. Supine to sit with MInimal Assistance  2. Sit to supine with MInimal Assistance  3. Sit to stand transfer with Minimal Assistance  4. Bed to chair transfer with Minimal Assistance using SQPTor SPT  5. Sitting at edge of bed x8 minutes with Contact Guard Assistance    MACRINA sutherland 2023

## 2023-09-13 NOTE — PT/OT/SLP EVAL
"Physical Therapy Evaluation    Patient Name:  Marely Hamilton   MRN:  [id number]    Recommendations:     Discharge Recommendations: nursing facility, skilled   Discharge Equipment Recommendations: hospital bed, lift device, wheelchair   Barriers to discharge:  requires assistance for mobility and care    Assessment:     Marely Hamilton is a 58 y.o. female admitted with a medical diagnosis of Pneumonia due to COVID-19 virus.  She presents with the following impairments/functional limitations: impaired endurance, weakness, impaired balance, gait instability, pain, impaired functional mobility, decreased lower extremity function, decreased upper extremity function, impaired fine motor, impaired skin, decreased ROM, impaired cognition Patient from SNF. LEs w/ marked edema; patient confused; refused to sit EOB after initially agreeing, saying "No, no, no" and unable to tolerate LE assessment/ ROM.    Rehab Prognosis: Fair; patient would benefit from acute skilled PT services to address these deficits and reach maximum level of function.    Recent Surgery: * No surgery found *      Plan:     During this hospitalization, patient to be seen 1 x/week to address the identified rehab impairments via gait training, therapeutic activities, therapeutic exercises and progress toward the following goals:    Plan of Care Expires:  10/13/23    Subjective     Chief Complaint: "Hair needs to be fluffed; she smokes cigars"  Patient/Family Comments/goals: agreeable to therapy, but then resisting  Pain/Comfort:  Pain Rating 1:  (not rated)  Location - Side 1: Bilateral  Location 1: leg (pain w/ touch and movement)  Pain Addressed 1: Reposition, Distraction, Nurse notified  Pain Rating Post-Intervention 1: 0/10    Patients cultural, spiritual, Restorationist conflicts given the current situation: no    Living Environment:  Patient from SNF at NH. Prior to NH, she was able to walk w/ RW and CGA.  Prior to admission, patients level of function " "was uncertain; patient poor historian.  Equipment used at home: walker, rolling.  DME owned (not currently used):  uncertain .  Upon discharge, patient will have assistance from NH staff.    Objective:   Therapy evaluation completed with Occupational Therapist to best establish plan of care for acute setting and to provide skilled treatment.    Communicated with nurse prior to session.  Patient found HOB elevated with peripheral IV (telesitter)  upon PT entry to room.    General Precautions: Standard, fall, droplet, airborne, contact  Orthopedic Precautions:N/A   Braces: N/A  Respiratory Status: Room air    Exams:  Cognitive Exam:  Patient is oriented to Person and , knew year was   Gross Motor Coordination:  decreased  Postural Exam:  Patient presented with the following abnormalities: unable to fully assess, patient unable to sit EOB. BLEs flexed and feet plantarflexed and increased edema"}  Skin Integrity/Edema:    marked edema in B feet, increased sensitivity to touch  RLE ROM: unable to fully assess LEs d/t patient c/o pain w/ touch and assisted movement: feet in plantarflexion, patient tolerated some ROM and has partial passive ROM of knee and hip  RLE Strength: no active movement observed  LLE ROM: unable to assess  LLE Strength: no active movement observed    Functional Mobility:  Bed Mobility:  patient assisted w/ rolling further to left and therapists initiated assisting patient to EOB and w/ patient began shouting "no, no , no" and effort terminated  Transfers:  unable to progress to transfer d/t decreased tolerance for mobility.      AM-PAC 6 CLICK MOBILITY  Total Score:7       Treatment & Education:  Patient educated on PT POC, no evidence of learning  Patient repositioned w/ pillows for LE pressure relief    Patient left HOB elevated with all lines intact, call button in reach, and nurse notified. Telesitter and bed alarm    GOALS:   Multidisciplinary Problems       Physical Therapy Goals       "    Problem: Physical Therapy    Goal Priority Disciplines Outcome Goal Variances Interventions   Physical Therapy Goal     PT, PT/OT Ongoing, Progressing     Description: Goals to be met by: 10/13/23     Patient will increase functional independence with mobility by performin. Supine to sit with MInimal Assistance  2. Sit to supine with MInimal Assistance  3. Sit to stand transfer with Minimal Assistance  4. Bed to chair transfer with Minimal Assistance using SQPTor SPT  5. Sitting at edge of bed x8 minutes with Contact Guard Assistance                         History:     Past Medical History:   Diagnosis Date    Addiction to drug     Alcohol abuse     Alcohol abuse, in remission 6/15/2015    14.5 weeks ago; AA weekly    Anemia     Anxiety 6/15/2015    Behavioral problem     Bipolar disorder     Bipolar disorder in remission 6/15/2015    Cirrhosis, Laennec's 6/15/2015    Depression     Encounter for blood transfusion     Epistaxis 6/15/2015    Fatigue     Glaucoma     Hematuria     Hepatic encephalopathy 6/15/2015    Hepatic enlargement     History of psychiatric care     History of psychiatric hospitalization     History of seizure 6/15/2015    1    hx of intentional Tylenol overdose 6/15/2015    2005- situational and hx of bipolar    Hx of psychiatric care     Macrocytic anemia 2015    6 units PRBC since 2015    Jeana     Osteoarthritis     Other ascites 6/15/2015    Psychiatric exam requested by authority     Psychiatric problem     Psychosis 2019    Renal disorder     Seizures     Self-harming behavior     Suicide attempt     Therapy     Thrombocytopenia 6/15/2015       Past Surgical History:   Procedure Laterality Date    COSMETIC SURGERY      EMBOLIZATION N/A 2023    Procedure: EMBOLIZATION, BLOOD VESSEL;  Surgeon: Debbie Surgeon;  Location: [facility];  Service: Anesthesiology;  Laterality: N/A;    ESOPHAGOGASTRODUODENOSCOPY      ESOPHAGOGASTRODUODENOSCOPY N/A 2023    Procedure:  EGD (ESOPHAGOGASTRODUODENOSCOPY);  Surgeon: Bernice Guillen MD;  Location: [facility] ([address] [place] [address]);  Service: Endoscopy;  Laterality: N/A;    ESOPHAGOGASTRODUODENOSCOPY N/A 7/11/2023    Procedure: EGD (ESOPHAGOGASTRODUODENOSCOPY);  Surgeon: Rangel Broussard MD;  Location: [address]);  Service: Endoscopy;  Laterality: N/A;       Time Tracking:     PT Received On: 09/13/23  PT Start Time: 1552     PT Stop Time: 1608  PT Total Time (min): 16 min     Billable Minutes: Evaluation 16      09/13/2023

## 2023-09-13 NOTE — PLAN OF CARE
Problem: Occupational Therapy  Goal: Occupational Therapy Goal  Description: Goals to be met by: 9/27/2023     Patient will increase functional independence with ADLs by performing:    UE Dressing with Minimal Assistance.  LE Dressing with Moderate Assistance.  Grooming while EOB with Minimal Assistance.  Toileting from bedside commode with Maximum Assistance for hygiene and clothing management.   Supine to sit with Minimal Assistance.  Stand pivot transfer with Maximum Assistance using LRAD as needed.  Toilet transfer to toilet with Maximum Assistance using LRAD as needed.    Outcome: Ongoing, Progressing     Pt evaluated and OT goals established.

## 2023-09-13 NOTE — ASSESSMENT & PLAN NOTE
MELD 3.0: 21 at 9/12/2023  9:56 PM  MELD-Na: 17 at 9/12/2023  9:56 PM  Calculated from:  Serum Creatinine: 0.6 mg/dL (Using min of 1 mg/dL) at 9/12/2023  6:43 PM  Serum Sodium: 142 mmol/L (Using max of 137 mmol/L) at 9/12/2023  6:43 PM  Total Bilirubin: 3.5 mg/dL at 9/12/2023  6:43 PM  Serum Albumin: 1.7 g/dL at 9/12/2023  6:43 PM  INR(ratio): 1.7 at 9/12/2023  9:56 PM  Age at listing (hypothetical): 58 years  Sex: Female at 9/12/2023  9:56 PM

## 2023-09-13 NOTE — PHYSICIAN QUERY
"PT Name: Marely Hamilton  MR #: [id number]    DOCUMENTATION CLARIFICATION     CDS/: Saima Gudino RN, CDI            Contact information:[email or web address]     This form is a permanent document in the medical record.     Query Date: September 13, 2023    By submitting this query, we are merely seeking further clarification of documentation. Please utilize your independent clinical judgment when addressing the question(s) below.    Supporting Clinical Findings Location in Medical Record   Acute cystitis  U/A with >100 WBC, many bacteria. UTI suspected to be source of recent bactermia, will treat with ceftriaxone, f/u urine and blood cultures    Of note, pt. Recently admitted to [facility] for E.coli bacteremia thought to be related to a UTI. ID recommended midline placement for ceftriaxone 2gm IV daily for total of 2 wks from 8/14 to 8/28/23. Pt. Reports that she completed this and her midline was removed.   H&P 9/1   Pt reports her last bowel movement was yesterday and that it "came out in the urine tube and they told me it was disgusting.'" On examination in ED, baugh catheter draining what appears to be urine only, no signs of fecal material in baugh or in urine bag.    ED provider note 8/31       Provider, please clarify if there is any clinical correlation between _____Acute cystitis______ and ____Foley catheter__________.           Are the conditions:      [  ] Due to or associated with each other   [ x ] Unrelated to each other   [  ] Other explanation (Please Specify): ______________   [  ] Clinically Undetermined                                                                               Please document in your progress notes daily for the duration of treatment until resolved and include in your discharge summary.                                                                                                             "

## 2023-09-13 NOTE — SUBJECTIVE & OBJECTIVE
Past Medical History:   Diagnosis Date    Addiction to drug     Alcohol abuse     Alcohol abuse, in remission 6/15/2015    14.5 weeks ago; AA weekly    Anemia     Anxiety 6/15/2015    Behavioral problem     Bipolar disorder     Bipolar disorder in remission 6/15/2015    Cirrhosis, Laennec's 6/15/2015    Depression     Encounter for blood transfusion     Epistaxis 6/15/2015    Fatigue     Glaucoma     Hematuria     Hepatic encephalopathy 6/15/2015    Hepatic enlargement     History of psychiatric care     History of psychiatric hospitalization     History of seizure 6/15/2015    1    hx of intentional Tylenol overdose 6/15/2015    2005- situational and hx of bipolar    Hx of psychiatric care     Macrocytic anemia 9/18/2015    6 units PRBC since June 2015    Jeana     Osteoarthritis     Other ascites 6/15/2015    Psychiatric exam requested by authority     Psychiatric problem     Psychosis 9/26/2019    Renal disorder     Seizures     Self-harming behavior     Suicide attempt     Therapy     Thrombocytopenia 6/15/2015       Past Surgical History:   Procedure Laterality Date    COSMETIC SURGERY      EMBOLIZATION N/A 6/11/2023    Procedure: EMBOLIZATION, BLOOD VESSEL;  Surgeon: Debbie Surgeon;  Location: [facility];  Service: Anesthesiology;  Laterality: N/A;    ESOPHAGOGASTRODUODENOSCOPY      ESOPHAGOGASTRODUODENOSCOPY N/A 7/6/2023    Procedure: EGD (ESOPHAGOGASTRODUODENOSCOPY);  Surgeon: Bernice Guillen MD;  Location: [address]);  Service: Endoscopy;  Laterality: N/A;    ESOPHAGOGASTRODUODENOSCOPY N/A 7/11/2023    Procedure: EGD (ESOPHAGOGASTRODUODENOSCOPY);  Surgeon: Rangel Broussard MD;  Location: [address]);  Service: Endoscopy;  Laterality: N/A;       Review of patient's allergies indicates:   Allergen Reactions    Sulfa (sulfonamide antibiotics) Rash    Codeine Nausea And Vomiting       No current facility-administered medications on file prior to encounter.     Current Outpatient Medications on  File Prior to Encounter   Medication Sig    ALPRAZolam (XANAX) 0.25 MG tablet Take 1 tablet (0.25 mg total) by mouth 2 (two) times daily as needed for Anxiety.    ferrous sulfate (FEOSOL) 325 mg (65 mg iron) Tab tablet Take 325 mg by mouth once daily.    furosemide (LASIX) 20 MG tablet Take 20 mg by mouth once daily.    lactulose (CHRONULAC) 20 gram/30 mL Soln Take 45 mLs (30 g total) by mouth 3 (three) times daily. TITRATE TO 3-4 BOWEL MOVEMENTS DAILY.    levetiracetam 500 mg/5 mL (5 mL) Soln Take 10 mLs (1,000 mg total) by mouth 2 (two) times daily.    lithium (LITHOBID) 300 MG CR tablet Take 1 tablet (300 mg total) by mouth every evening.    OLANZapine (ZYPREXA) 5 MG tablet Take 1 tablet (5 mg total) by mouth every evening.    pantoprazole (PROTONIX) 40 mg suspension Take 1 packet (40 mg total) by mouth 2 (two) times daily.    rifAXIMin (XIFAXAN) 550 mg Tab Take 1 tablet (550 mg total) by mouth 2 (two) times daily.     Family History       Problem Relation (Age of Onset)    Alcohol abuse Father, Sister, Paternal Grandfather    Bipolar disorder Father    Hypertension Mother    Suicide Father          Tobacco Use    Smoking status: Never    Smokeless tobacco: Never   Substance and Sexual Activity    Alcohol use: Not Currently     Comment: hx of ETOH abuse with cirrhosis of liver    Drug use: No    Sexual activity: Not Currently     Review of Systems   Unable to perform ROS: Mental status change     Objective:     Vital Signs (Most Recent):  Temp: 98.7 °F (37.1 °C) (09/12/23 2240)  Pulse: 82 (09/13/23 0028)  Resp: 18 (09/13/23 0028)  BP: (!) 146/70 (09/12/23 2240)  SpO2: (!) 94 % (09/13/23 0028) Vital Signs (24h Range):  Temp:  [98.2 °F (36.8 °C)-98.7 °F (37.1 °C)] 98.7 °F (37.1 °C)  Pulse:  [70-94] 82  Resp:  [11-20] 18  SpO2:  [91 %-100 %] 94 %  BP: (131-167)/(52-84) 146/70     Weight: 57.4 kg (126 lb 8.7 oz)  Body mass index is 23.15 kg/m².     Physical Exam  Constitutional:       General: She is not in acute  distress.     Appearance: She is ill-appearing and diaphoretic. She is not toxic-appearing.   HENT:      Head: Normocephalic and atraumatic.      Nose: Nose normal.   Eyes:      General: Scleral icterus present.      Extraocular Movements: Extraocular movements intact.      Pupils: Pupils are equal, round, and reactive to light.   Cardiovascular:      Rate and Rhythm: Regular rhythm. Tachycardia present.   Pulmonary:      Effort: Pulmonary effort is normal. No respiratory distress.      Breath sounds: Rales present. No wheezing.   Abdominal:      General: Abdomen is flat. There is distension.      Palpations: Abdomen is soft.      Tenderness: There is no abdominal tenderness. There is no guarding.   Musculoskeletal:         General: Normal range of motion.      Cervical back: Normal range of motion and neck supple. No rigidity.      Right lower leg: Edema present.      Left lower leg: Edema present.   Skin:     General: Skin is warm.      Coloration: Skin is not jaundiced or pale.   Neurological:      General: No focal deficit present.      Mental Status: She is alert. She is disoriented.              CRANIAL NERVES     CN III, IV, VI   Pupils are equal, round, and reactive to light.       Significant Labs: All pertinent labs within the past 24 hours have been reviewed.  CBC:   Recent Labs   Lab 09/12/23  1843   WBC 3.35*   HGB 8.4*   HCT 25.7*   PLT 67*     CMP:   Recent Labs   Lab 09/12/23  1843      K 3.2*   *   CO2 18*   *   BUN 6   CREATININE 0.6   CALCIUM 8.0*   PROT 5.6*   ALBUMIN 1.7*   BILITOT 3.5*   ALKPHOS 143*   AST 39   ALT 14   ANIONGAP 9     Cardiac Markers:   Recent Labs   Lab 09/12/23  1843   BNP 90     Lactic Acid:   Recent Labs   Lab 09/12/23  2156   LACTATE 1.9     Urine Studies:   Recent Labs   Lab 09/12/23 2124   COLORU Yellow   APPEARANCEUA Hazy*   PHUR 7.0   SPECGRAV 1.010   PROTEINUA 1+*   GLUCUA Negative   KETONESU Negative   BILIRUBINUA Negative   OCCULTUA 2+*   NITRITE  Negative   LEUKOCYTESUR 3+*   RBCUA 24*   WBCUA 95*   BACTERIA Few*   SQUAMEPITHEL 2   HYALINECASTS 0       Significant Imaging: I have reviewed all pertinent imaging results/findings within the past 24 hours.

## 2023-09-14 LAB
ALBUMIN SERPL BCP-MCNC: 1.7 G/DL (ref 3.5–5.2)
ALP SERPL-CCNC: 141 U/L (ref 55–135)
ALT SERPL W/O P-5'-P-CCNC: 13 U/L (ref 10–44)
ANION GAP SERPL CALC-SCNC: 9 MMOL/L (ref 8–16)
AST SERPL-CCNC: 35 U/L (ref 10–40)
BASOPHILS # BLD AUTO: 0 K/UL (ref 0–0.2)
BASOPHILS # BLD AUTO: 0 K/UL (ref 0–0.2)
BASOPHILS NFR BLD: 0 % (ref 0–1.9)
BASOPHILS NFR BLD: 0 % (ref 0–1.9)
BILIRUB SERPL-MCNC: 3 MG/DL (ref 0.1–1)
BUN SERPL-MCNC: 8 MG/DL (ref 6–20)
CALCIUM SERPL-MCNC: 7.8 MG/DL (ref 8.7–10.5)
CHLORIDE SERPL-SCNC: 114 MMOL/L (ref 95–110)
CO2 SERPL-SCNC: 20 MMOL/L (ref 23–29)
CREAT SERPL-MCNC: 0.5 MG/DL (ref 0.5–1.4)
DIFFERENTIAL METHOD: ABNORMAL
DIFFERENTIAL METHOD: ABNORMAL
EOSINOPHIL # BLD AUTO: 0 K/UL (ref 0–0.5)
EOSINOPHIL # BLD AUTO: 0 K/UL (ref 0–0.5)
EOSINOPHIL NFR BLD: 0 % (ref 0–8)
EOSINOPHIL NFR BLD: 0 % (ref 0–8)
ERYTHROCYTE [DISTWIDTH] IN BLOOD BY AUTOMATED COUNT: 15.6 % (ref 11.5–14.5)
ERYTHROCYTE [DISTWIDTH] IN BLOOD BY AUTOMATED COUNT: 15.6 % (ref 11.5–14.5)
EST. GFR  (NO RACE VARIABLE): >60 ML/MIN/1.73 M^2
GLUCOSE SERPL-MCNC: 142 MG/DL (ref 70–110)
HCT VFR BLD AUTO: 25 % (ref 37–48.5)
HCT VFR BLD AUTO: 25 % (ref 37–48.5)
HGB BLD-MCNC: 8.1 G/DL (ref 12–16)
HGB BLD-MCNC: 8.1 G/DL (ref 12–16)
IMM GRANULOCYTES # BLD AUTO: 0.02 K/UL (ref 0–0.04)
IMM GRANULOCYTES # BLD AUTO: 0.02 K/UL (ref 0–0.04)
IMM GRANULOCYTES NFR BLD AUTO: 0.6 % (ref 0–0.5)
IMM GRANULOCYTES NFR BLD AUTO: 0.6 % (ref 0–0.5)
INR PPP: 1.8 (ref 0.8–1.2)
LDH SERPL L TO P-CCNC: 387 U/L (ref 110–260)
LYMPHOCYTES # BLD AUTO: 0.5 K/UL (ref 1–4.8)
LYMPHOCYTES # BLD AUTO: 0.5 K/UL (ref 1–4.8)
LYMPHOCYTES NFR BLD: 14.1 % (ref 18–48)
LYMPHOCYTES NFR BLD: 14.1 % (ref 18–48)
MCH RBC QN AUTO: 35.8 PG (ref 27–31)
MCH RBC QN AUTO: 35.8 PG (ref 27–31)
MCHC RBC AUTO-ENTMCNC: 32.4 G/DL (ref 32–36)
MCHC RBC AUTO-ENTMCNC: 32.4 G/DL (ref 32–36)
MCV RBC AUTO: 111 FL (ref 82–98)
MCV RBC AUTO: 111 FL (ref 82–98)
MONOCYTES # BLD AUTO: 0.2 K/UL (ref 0.3–1)
MONOCYTES # BLD AUTO: 0.2 K/UL (ref 0.3–1)
MONOCYTES NFR BLD: 5 % (ref 4–15)
MONOCYTES NFR BLD: 5 % (ref 4–15)
NEUTROPHILS # BLD AUTO: 2.7 K/UL (ref 1.8–7.7)
NEUTROPHILS # BLD AUTO: 2.7 K/UL (ref 1.8–7.7)
NEUTROPHILS NFR BLD: 80.3 % (ref 38–73)
NEUTROPHILS NFR BLD: 80.3 % (ref 38–73)
NRBC BLD-RTO: 0 /100 WBC
NRBC BLD-RTO: 0 /100 WBC
PLATELET # BLD AUTO: 69 K/UL (ref 150–450)
PLATELET # BLD AUTO: 69 K/UL (ref 150–450)
PMV BLD AUTO: 9.4 FL (ref 9.2–12.9)
PMV BLD AUTO: 9.4 FL (ref 9.2–12.9)
POCT GLUCOSE: 165 MG/DL (ref 70–110)
POCT GLUCOSE: 183 MG/DL (ref 70–110)
POTASSIUM SERPL-SCNC: 3.3 MMOL/L (ref 3.5–5.1)
PROT SERPL-MCNC: 5.4 G/DL (ref 6–8.4)
PROTHROMBIN TIME: 18.2 SEC (ref 9–12.5)
RBC # BLD AUTO: 2.26 M/UL (ref 4–5.4)
RBC # BLD AUTO: 2.26 M/UL (ref 4–5.4)
SODIUM SERPL-SCNC: 143 MMOL/L (ref 136–145)
WBC # BLD AUTO: 3.41 K/UL (ref 3.9–12.7)
WBC # BLD AUTO: 3.41 K/UL (ref 3.9–12.7)

## 2023-09-14 PROCEDURE — 83615 LACTATE (LD) (LDH) ENZYME: CPT | Performed by: FAMILY MEDICINE

## 2023-09-14 PROCEDURE — 63700000 PHARM REV CODE 250 ALT 637 W/O HCPCS: Performed by: FAMILY MEDICINE

## 2023-09-14 PROCEDURE — 94640 AIRWAY INHALATION TREATMENT: CPT

## 2023-09-14 PROCEDURE — 85379 FIBRIN DEGRADATION QUANT: CPT | Performed by: FAMILY MEDICINE

## 2023-09-14 PROCEDURE — 85610 PROTHROMBIN TIME: CPT | Performed by: FAMILY MEDICINE

## 2023-09-14 PROCEDURE — 85025 COMPLETE CBC W/AUTO DIFF WBC: CPT | Performed by: FAMILY MEDICINE

## 2023-09-14 PROCEDURE — 99900035 HC TECH TIME PER 15 MIN (STAT)

## 2023-09-14 PROCEDURE — 20600001 HC STEP DOWN PRIVATE ROOM

## 2023-09-14 PROCEDURE — 82728 ASSAY OF FERRITIN: CPT | Performed by: FAMILY MEDICINE

## 2023-09-14 PROCEDURE — 94761 N-INVAS EAR/PLS OXIMETRY MLT: CPT

## 2023-09-14 PROCEDURE — 27000207 HC ISOLATION

## 2023-09-14 PROCEDURE — 99232 SBSQ HOSP IP/OBS MODERATE 35: CPT | Mod: ,,, | Performed by: STUDENT IN AN ORGANIZED HEALTH CARE EDUCATION/TRAINING PROGRAM

## 2023-09-14 PROCEDURE — 63600175 PHARM REV CODE 636 W HCPCS: Performed by: HOSPITALIST

## 2023-09-14 PROCEDURE — 25000003 PHARM REV CODE 250: Performed by: FAMILY MEDICINE

## 2023-09-14 PROCEDURE — 80053 COMPREHEN METABOLIC PANEL: CPT | Performed by: FAMILY MEDICINE

## 2023-09-14 PROCEDURE — 25000003 PHARM REV CODE 250: Performed by: HOSPITALIST

## 2023-09-14 PROCEDURE — 99232 PR SUBSEQUENT HOSPITAL CARE,LEVL II: ICD-10-PCS | Mod: ,,, | Performed by: STUDENT IN AN ORGANIZED HEALTH CARE EDUCATION/TRAINING PROGRAM

## 2023-09-14 PROCEDURE — 36415 COLL VENOUS BLD VENIPUNCTURE: CPT | Performed by: FAMILY MEDICINE

## 2023-09-14 PROCEDURE — 63600175 PHARM REV CODE 636 W HCPCS: Performed by: FAMILY MEDICINE

## 2023-09-14 RX ADMIN — PANTOPRAZOLE SODIUM 40 MG: 40 GRANULE, DELAYED RELEASE ORAL at 09:09

## 2023-09-14 RX ADMIN — ACETAMINOPHEN 500 MG: 500 TABLET ORAL at 11:09

## 2023-09-14 RX ADMIN — LACTULOSE 30 G: 20 SOLUTION ORAL at 04:09

## 2023-09-14 RX ADMIN — MICONAZOLE NITRATE: 20 OINTMENT TOPICAL at 09:09

## 2023-09-14 RX ADMIN — Medication 6 MG: at 09:09

## 2023-09-14 RX ADMIN — CEFTRIAXONE 1 G: 1 INJECTION, POWDER, FOR SOLUTION INTRAMUSCULAR; INTRAVENOUS at 06:09

## 2023-09-14 RX ADMIN — REMDESIVIR 100 MG: 100 INJECTION, POWDER, LYOPHILIZED, FOR SOLUTION INTRAVENOUS at 11:09

## 2023-09-14 RX ADMIN — OXYCODONE HYDROCHLORIDE AND ACETAMINOPHEN 500 MG: 500 TABLET ORAL at 09:09

## 2023-09-14 RX ADMIN — DEXAMETHASONE 6 MG: 4 TABLET ORAL at 09:09

## 2023-09-14 RX ADMIN — ALBUTEROL SULFATE 2 PUFF: 108 INHALANT RESPIRATORY (INHALATION) at 07:09

## 2023-09-14 RX ADMIN — RIFAXIMIN 550 MG: 550 TABLET ORAL at 09:09

## 2023-09-14 RX ADMIN — AZITHROMYCIN 500 MG: 250 TABLET, FILM COATED ORAL at 09:09

## 2023-09-14 RX ADMIN — ALBUTEROL SULFATE 2 PUFF: 108 INHALANT RESPIRATORY (INHALATION) at 09:09

## 2023-09-14 RX ADMIN — FUROSEMIDE 20 MG: 20 TABLET ORAL at 09:09

## 2023-09-14 RX ADMIN — LACTULOSE 30 G: 20 SOLUTION ORAL at 09:09

## 2023-09-14 RX ADMIN — THERA TABS 1 TABLET: TAB at 09:09

## 2023-09-14 RX ADMIN — LEVETIRACETAM 1000 MG: 100 SOLUTION ORAL at 09:09

## 2023-09-14 RX ADMIN — ALBUTEROL SULFATE 2 PUFF: 108 INHALANT RESPIRATORY (INHALATION) at 12:09

## 2023-09-14 RX ADMIN — LITHIUM CARBONATE 300 MG: 300 TABLET, FILM COATED, EXTENDED RELEASE ORAL at 09:09

## 2023-09-14 RX ADMIN — OLANZAPINE 5 MG: 5 TABLET, FILM COATED ORAL at 11:09

## 2023-09-14 NOTE — NURSING
Nurses Note -- 4 Eyes      9/13/2023   7:39 PM      Skin assessed during: Q Shift Change      [] No Altered Skin Integrity Present    []Prevention Measures Documented      [x] Yes- Altered Skin Integrity Present or Discovered   [] LDA Added if Not in Epic (Describe Wound)   [] New Altered Skin Integrity was Present on Admit and Documented in LDA   [] Wound Image Taken    Wound Care Consulted? No    Attending Nurse:  Billy Rangel RN/Staff Member:  NEHAL Viveros

## 2023-09-14 NOTE — CLINICAL REVIEW
IP Sepsis Screen (most recent)       Sepsis Screen (IP) - 09/14/23 6897       Is the patient's history or complaint suggestive of a possible infection? Yes  -CB    Are there at least two of the following signs and symptoms present? Yes  -CB    Sepsis signs/symptoms - Tachypnea Tachypnea     >20  -CB    Sepsis signs/symptoms - WBC WBC < 4,000 or WBC > 12,000  -CB    Are any of the following organ dysfunction criteria present and not considered to be due to a chronic condition? Yes   cirrhosis -CB    Organ Dysfunction Criteria - O2 O2 Saturation < 95% on room air  -CB    Initiate Sepsis Protocol No  -CB    Reason sepsis not considered Pt. receiving appropriate management  -CB              User Key  (r) = Recorded By, (t) = Taken By, (c) = Cosigned By      Initials Name    Latisha Figueroa RN

## 2023-09-14 NOTE — SUBJECTIVE & OBJECTIVE
Interval History: Patient reports improvement in breathing and cough. On RA now. Does not use oxygen at baseline. Receiving 2/3 dose of Remdesivir today    Review of Systems  Objective:     Vital Signs (Most Recent):  Temp: 98.2 °F (36.8 °C) (09/14/23 1155)  Pulse: 80 (09/14/23 0817)  Resp: 18 (09/14/23 0817)  BP: 131/62 (09/14/23 0817)  SpO2: 95 % (09/14/23 0817) Vital Signs (24h Range):  Temp:  [98 °F (36.7 °C)-99.2 °F (37.3 °C)] 98.2 °F (36.8 °C)  Pulse:  [74-89] 80  Resp:  [16-20] 18  SpO2:  [91 %-95 %] 95 %  BP: (125-138)/(60-65) 131/62     Weight: 57.4 kg (126 lb 8.7 oz)  Body mass index is 23.15 kg/m².    Intake/Output Summary (Last 24 hours) at 9/14/2023 1206  Last data filed at 9/14/2023 0534  Gross per 24 hour   Intake --   Output 700 ml   Net -700 ml         Physical Exam  Constitutional:       Appearance: She is ill-appearing.   Cardiovascular:      Rate and Rhythm: Normal rate.   Pulmonary:      Effort: Pulmonary effort is normal.      Breath sounds: Normal breath sounds. No wheezing.   Abdominal:      General: Abdomen is flat.      Palpations: Abdomen is soft.   Skin:     General: Skin is warm and dry.   Neurological:      Mental Status: She is alert and oriented to person, place, and time. Mental status is at baseline.   Psychiatric:         Mood and Affect: Mood normal.

## 2023-09-14 NOTE — PLAN OF CARE
09/14/23 1250   Post-Acute Status   Post-Acute Authorization Placement   Post-Acute Placement Status Referrals Sent     CM informed that patient is expected to be stable for discharge tomorrow. Referral sent to [facility] via [facility].    14:50PM  Call placed to [facility] ([phone]) and this CM confirmed that Ms. Hamilton would be able to return to the facility. She will be placed in an isolation room due to her Covid status. This CM submitted to [facility] for insurance authorization. Will continue to follow.    Jyoti Jones RN  Ext [phone]

## 2023-09-14 NOTE — PLAN OF CARE
Problem: Adult Inpatient Plan of Care  Goal: Plan of Care Review  Outcome: Ongoing, Progressing  Goal: Patient-Specific Goal (Individualized)  Outcome: Ongoing, Progressing  Goal: Absence of Hospital-Acquired Illness or Injury  Outcome: Ongoing, Progressing  Goal: Optimal Comfort and Wellbeing  Outcome: Ongoing, Progressing   Pt rested a little. VS WNL. No ss of pain or distress. Remains confused with repetition of random words and phrases. Call light within reach. Bed in low position. Saldaña care completed. Bed alarm on. Avasys in room. Will continue to monitor and pass on care to oncoming nurse.

## 2023-09-14 NOTE — ASSESSMENT & PLAN NOTE
Patient is identified as Severe COVID-19 based on hypoxemia with O2 saturations <94% on room air or on ambulation   Initiate standard COVID protocols; COVID-19 testing Collection Date: 8/3/2023 Collection Time:  10:45 AM ,Infection Control notification  and isolation- respiratory, contact and droplet per protocol    Diagnostics: CBC, CMP, Procalcitonin, Ferritin, CRP, Blood Culture x2 and Portable CXR    Management: Initiate targeted therapy with Remdesivir, 200mg IV x1, followed by 100mg IV daily x3 days total and Dexamethasone PO/IV 6mg daily x10 days, Maintain oxygen saturations 92-96% via Nasal Cannula 2 LPM and monitor with continuous/intermittent pulse oximetry.  and Inhaled bronchodilators as needed for shortness of breath.    Advance Care Planning  Current advance care plan has not been discussed with patient/family/POA and patient currently wishes Full Code.

## 2023-09-15 VITALS
DIASTOLIC BLOOD PRESSURE: 76 MMHG | BODY MASS INDEX: 23.29 KG/M2 | OXYGEN SATURATION: 95 % | HEIGHT: 62 IN | TEMPERATURE: 98 F | WEIGHT: 126.56 LBS | RESPIRATION RATE: 18 BRPM | SYSTOLIC BLOOD PRESSURE: 138 MMHG | HEART RATE: 80 BPM

## 2023-09-15 DIAGNOSIS — U07.1 COVID-19 VIRUS DETECTED: ICD-10-CM

## 2023-09-15 LAB
ALBUMIN SERPL BCP-MCNC: 1.8 G/DL (ref 3.5–5.2)
ALP SERPL-CCNC: 187 U/L (ref 55–135)
ALT SERPL W/O P-5'-P-CCNC: 16 U/L (ref 10–44)
ANION GAP SERPL CALC-SCNC: 11 MMOL/L (ref 8–16)
AST SERPL-CCNC: 41 U/L (ref 10–40)
BASOPHILS # BLD AUTO: 0 K/UL (ref 0–0.2)
BASOPHILS # BLD AUTO: 0 K/UL (ref 0–0.2)
BASOPHILS NFR BLD: 0 % (ref 0–1.9)
BASOPHILS NFR BLD: 0 % (ref 0–1.9)
BILIRUB SERPL-MCNC: 2.8 MG/DL (ref 0.1–1)
BUN SERPL-MCNC: 8 MG/DL (ref 6–20)
CALCIUM SERPL-MCNC: 7.6 MG/DL (ref 8.7–10.5)
CHLORIDE SERPL-SCNC: 114 MMOL/L (ref 95–110)
CO2 SERPL-SCNC: 14 MMOL/L (ref 23–29)
CREAT SERPL-MCNC: 0.6 MG/DL (ref 0.5–1.4)
D DIMER PPP IA.FEU-MCNC: 6.62 MG/L FEU
DIFFERENTIAL METHOD: ABNORMAL
DIFFERENTIAL METHOD: ABNORMAL
EOSINOPHIL # BLD AUTO: 0 K/UL (ref 0–0.5)
EOSINOPHIL # BLD AUTO: 0 K/UL (ref 0–0.5)
EOSINOPHIL NFR BLD: 0.6 % (ref 0–8)
EOSINOPHIL NFR BLD: 0.6 % (ref 0–8)
ERYTHROCYTE [DISTWIDTH] IN BLOOD BY AUTOMATED COUNT: 15.6 % (ref 11.5–14.5)
ERYTHROCYTE [DISTWIDTH] IN BLOOD BY AUTOMATED COUNT: 15.6 % (ref 11.5–14.5)
EST. GFR  (NO RACE VARIABLE): >60 ML/MIN/1.73 M^2
FERRITIN SERPL-MCNC: 405 NG/ML (ref 20–300)
GLUCOSE SERPL-MCNC: 147 MG/DL (ref 70–110)
HCT VFR BLD AUTO: 26 % (ref 37–48.5)
HCT VFR BLD AUTO: 26 % (ref 37–48.5)
HGB BLD-MCNC: 8.5 G/DL (ref 12–16)
HGB BLD-MCNC: 8.5 G/DL (ref 12–16)
IMM GRANULOCYTES # BLD AUTO: 0.05 K/UL (ref 0–0.04)
IMM GRANULOCYTES # BLD AUTO: 0.05 K/UL (ref 0–0.04)
IMM GRANULOCYTES NFR BLD AUTO: 0.8 % (ref 0–0.5)
IMM GRANULOCYTES NFR BLD AUTO: 0.8 % (ref 0–0.5)
INR PPP: 1.8 (ref 0.8–1.2)
LYMPHOCYTES # BLD AUTO: 0.7 K/UL (ref 1–4.8)
LYMPHOCYTES # BLD AUTO: 0.7 K/UL (ref 1–4.8)
LYMPHOCYTES NFR BLD: 11.1 % (ref 18–48)
LYMPHOCYTES NFR BLD: 11.1 % (ref 18–48)
MCH RBC QN AUTO: 35.1 PG (ref 27–31)
MCH RBC QN AUTO: 35.1 PG (ref 27–31)
MCHC RBC AUTO-ENTMCNC: 32.7 G/DL (ref 32–36)
MCHC RBC AUTO-ENTMCNC: 32.7 G/DL (ref 32–36)
MCV RBC AUTO: 107 FL (ref 82–98)
MCV RBC AUTO: 107 FL (ref 82–98)
MONOCYTES # BLD AUTO: 0.5 K/UL (ref 0.3–1)
MONOCYTES # BLD AUTO: 0.5 K/UL (ref 0.3–1)
MONOCYTES NFR BLD: 7.6 % (ref 4–15)
MONOCYTES NFR BLD: 7.6 % (ref 4–15)
NEUTROPHILS # BLD AUTO: 5.3 K/UL (ref 1.8–7.7)
NEUTROPHILS # BLD AUTO: 5.3 K/UL (ref 1.8–7.7)
NEUTROPHILS NFR BLD: 79.9 % (ref 38–73)
NEUTROPHILS NFR BLD: 79.9 % (ref 38–73)
NRBC BLD-RTO: 0 /100 WBC
NRBC BLD-RTO: 0 /100 WBC
PLATELET # BLD AUTO: 80 K/UL (ref 150–450)
PLATELET # BLD AUTO: 80 K/UL (ref 150–450)
PMV BLD AUTO: 9.1 FL (ref 9.2–12.9)
PMV BLD AUTO: 9.1 FL (ref 9.2–12.9)
POCT GLUCOSE: 190 MG/DL (ref 70–110)
POTASSIUM SERPL-SCNC: 3.1 MMOL/L (ref 3.5–5.1)
PROT SERPL-MCNC: 5.6 G/DL (ref 6–8.4)
PROTHROMBIN TIME: 18.5 SEC (ref 9–12.5)
RBC # BLD AUTO: 2.42 M/UL (ref 4–5.4)
RBC # BLD AUTO: 2.42 M/UL (ref 4–5.4)
SODIUM SERPL-SCNC: 139 MMOL/L (ref 136–145)
WBC # BLD AUTO: 6.6 K/UL (ref 3.9–12.7)
WBC # BLD AUTO: 6.6 K/UL (ref 3.9–12.7)

## 2023-09-15 PROCEDURE — 99239 HOSP IP/OBS DSCHRG MGMT >30: CPT | Mod: ,,, | Performed by: STUDENT IN AN ORGANIZED HEALTH CARE EDUCATION/TRAINING PROGRAM

## 2023-09-15 PROCEDURE — 63600175 PHARM REV CODE 636 W HCPCS: Mod: JZ,TB | Performed by: FAMILY MEDICINE

## 2023-09-15 PROCEDURE — 1111F PR DISCHARGE MEDS RECONCILED W/ CURRENT OUTPATIENT MED LIST: ICD-10-PCS | Mod: CPTII,,, | Performed by: STUDENT IN AN ORGANIZED HEALTH CARE EDUCATION/TRAINING PROGRAM

## 2023-09-15 PROCEDURE — 94761 N-INVAS EAR/PLS OXIMETRY MLT: CPT

## 2023-09-15 PROCEDURE — 25000003 PHARM REV CODE 250: Performed by: FAMILY MEDICINE

## 2023-09-15 PROCEDURE — 36415 COLL VENOUS BLD VENIPUNCTURE: CPT | Performed by: FAMILY MEDICINE

## 2023-09-15 PROCEDURE — 97110 THERAPEUTIC EXERCISES: CPT

## 2023-09-15 PROCEDURE — 94640 AIRWAY INHALATION TREATMENT: CPT

## 2023-09-15 PROCEDURE — 99900035 HC TECH TIME PER 15 MIN (STAT)

## 2023-09-15 PROCEDURE — 80053 COMPREHEN METABOLIC PANEL: CPT | Performed by: FAMILY MEDICINE

## 2023-09-15 PROCEDURE — 25000003 PHARM REV CODE 250: Performed by: STUDENT IN AN ORGANIZED HEALTH CARE EDUCATION/TRAINING PROGRAM

## 2023-09-15 PROCEDURE — 63600175 PHARM REV CODE 636 W HCPCS: Performed by: HOSPITALIST

## 2023-09-15 PROCEDURE — 99239 PR HOSPITAL DISCHARGE DAY,>30 MIN: ICD-10-PCS | Mod: ,,, | Performed by: STUDENT IN AN ORGANIZED HEALTH CARE EDUCATION/TRAINING PROGRAM

## 2023-09-15 PROCEDURE — 25000003 PHARM REV CODE 250: Performed by: HOSPITALIST

## 2023-09-15 PROCEDURE — 85025 COMPLETE CBC W/AUTO DIFF WBC: CPT | Performed by: FAMILY MEDICINE

## 2023-09-15 PROCEDURE — 85610 PROTHROMBIN TIME: CPT | Performed by: FAMILY MEDICINE

## 2023-09-15 PROCEDURE — 97535 SELF CARE MNGMENT TRAINING: CPT

## 2023-09-15 PROCEDURE — 1111F DSCHRG MED/CURRENT MED MERGE: CPT | Mod: CPTII,,, | Performed by: STUDENT IN AN ORGANIZED HEALTH CARE EDUCATION/TRAINING PROGRAM

## 2023-09-15 RX ORDER — CEFPODOXIME PROXETIL 100 MG/1
200 TABLET, FILM COATED ORAL EVERY 12 HOURS
Start: 2023-09-15 | End: 2023-09-17

## 2023-09-15 RX ADMIN — OXYCODONE HYDROCHLORIDE AND ACETAMINOPHEN 500 MG: 500 TABLET ORAL at 09:09

## 2023-09-15 RX ADMIN — ALBUTEROL SULFATE 2 PUFF: 108 INHALANT RESPIRATORY (INHALATION) at 12:09

## 2023-09-15 RX ADMIN — POTASSIUM BICARBONATE 50 MEQ: 978 TABLET, EFFERVESCENT ORAL at 09:09

## 2023-09-15 RX ADMIN — DEXAMETHASONE 6 MG: 4 TABLET ORAL at 09:09

## 2023-09-15 RX ADMIN — LACTULOSE 30 G: 20 SOLUTION ORAL at 09:09

## 2023-09-15 RX ADMIN — FUROSEMIDE 20 MG: 20 TABLET ORAL at 09:09

## 2023-09-15 RX ADMIN — ALBUTEROL SULFATE 2 PUFF: 108 INHALANT RESPIRATORY (INHALATION) at 09:09

## 2023-09-15 RX ADMIN — CEFTRIAXONE 1 G: 1 INJECTION, POWDER, FOR SOLUTION INTRAMUSCULAR; INTRAVENOUS at 12:09

## 2023-09-15 RX ADMIN — REMDESIVIR 100 MG: 100 INJECTION, POWDER, LYOPHILIZED, FOR SOLUTION INTRAVENOUS at 09:09

## 2023-09-15 RX ADMIN — PANTOPRAZOLE SODIUM 40 MG: 40 GRANULE, DELAYED RELEASE ORAL at 09:09

## 2023-09-15 RX ADMIN — LEVETIRACETAM 1000 MG: 100 SOLUTION ORAL at 09:09

## 2023-09-15 RX ADMIN — THERA TABS 1 TABLET: TAB at 09:09

## 2023-09-15 RX ADMIN — MICONAZOLE NITRATE: 20 OINTMENT TOPICAL at 09:09

## 2023-09-15 RX ADMIN — ALBUTEROL SULFATE 2 PUFF: 108 INHALANT RESPIRATORY (INHALATION) at 02:09

## 2023-09-15 RX ADMIN — RIFAXIMIN 550 MG: 550 TABLET ORAL at 09:09

## 2023-09-15 NOTE — DISCHARGE SUMMARY
[facility] - Telemetry [facility] Medicine  Discharge Summary      Patient Name: Marely Hamilton  MRN: [id number]  BUTCH: [id number]  Patient Class: IP- Inpatient  Admission Date: 9/12/2023  Hospital Length of Stay: 3 days  Discharge Date and Time:  09/15/2023 2:01 PM  Attending Physician: Pancho Harris MD   Discharging Provider: Pancho Harris MD  Primary Care Provider: Viktor Ross MD  [facility] Medicine Team: [facility]OSP MED L Pancho Harris MD  Primary Care Team: [facility] MED     HPI:   Marely Hamilton is a 58 y.o. female with medical history of ETOH Cirrhosis, Hepatic encephalopathy, Recurrent GI bleed, Chronic LE DVT s/p IVC filter, E coli bacteremia presenting to the ED with the chief complaint of shortness of breath. Patient with recent admission 08/31 to 09/07 for hepatic encephalopathy and UTI. Discharged in stable condition to NH facility where she presents from today.      Limited history from patient 2/2 confusion. Presents via EMS from Replaced by [facility] for concerns of PNA. CXR today showed RLL opacities. I called nursing home who state patient is confused and hallucinates at her baseline. They state she appeared SOB earlier today which led to her getting a CXR. No reported fever, chest pain, abdominal pain, vomiting, urinary or bowel movement changes. No oxygen use at home.     In the ED patient afebrile and hemodynamically stable with hypoxia at rest improved on 2L NC. COVID positive. Ammonia elevated and UA concerning for UTI. Patient admitted to the care of medicine for futher evaluation and management.       * No surgery found *      Hospital Course:   * Pneumonia due to COVID-19 virus  Received 3 days of IV remdesivir and dexamethosone.  On RA. Denies SOB and cough.   Empiric CTX (to cover for superimposed bacterial PNA)     Positive urine cx  - Patient denies symptoms of UTI  -Received empiric CTX (primary to cover for superimposed bacterial PNA)     Deep vein thrombosis (DVT) of femoral vein of  right lower extremity  - bilateral lower extremity venous ultrasound on 08/15/2023= Chronic DVT of the right common femoral and saphenofemoral junction. Bilateral inguinal ascites  -IVC filter in place (patient had IVC filter placed on recent hospitalization )  -patient not a candidate for anticoagulation due to recurrent GI bleeding and severe thrombocytopenia     Bipolar 1 disorder, depressed, severe  -  home lithium    Hepatic encephalopathy  - lactulose 30g TID  - rifaximin BID  - Awake, AnO x 3.      Chronic liver failure  MELD 3.0: 21 at 9/12/2023  9:56 PM  MELD-Na: 17 at 9/12/2023  9:56 PM  Calculated from:  Serum Creatinine: 0.6 mg/dL (Using min of 1 mg/dL) at 9/12/2023  6:43 PM  Serum Sodium: 142 mmol/L (Using max of 137 mmol/L) at 9/12/2023  6:43 PM  Total Bilirubin: 3.5 mg/dL at 9/12/2023  6:43 PM  Serum Albumin: 1.7 g/dL at 9/12/2023  6:43 PM  INR(ratio): 1.7 at 9/12/2023  9:56 PM  Age at listing (hypothetical): 58 years  Sex: Female at 9/12/2023  9:56 PM     History of seizure  - continue home keppra       Patient is currently medically and HDS. She is being d/c to SNF. Plan of care discussed with patient and sister Perla (over a phone call), verbalized understanding. All questions were answered.         Goals of Care Treatment Preferences:  Code Status: Full Code    Health care agent: Pt's sisters are NOK. Call Zaria .  Health care agent number: No value filed.                   Consults:   Consults (From admission, onward)        Status Ordering Provider     Inpatient consult to Registered Dietitian/Nutritionist  Once        Provider:  (Not yet assigned)    Completed GEORGE SWANSON     Inpatient consult to Palliative Care  Once        Provider:  (Not yet assigned)    Completed GEORGE SWANSON     Inpatient consult to Registered Dietitian/Nutritionist  Once        Provider:  (Not yet assigned)    Completed GEORGE SWANSON     IP consult to case management  Once        Provider:  (Not  yet assigned)    Acknowledged GEORGE SWANSON          No new Assessment & Plan notes have been filed under this hospital service since the last note was generated.  Service: Hospital Medicine    Final Active Diagnoses:    Diagnosis Date Noted POA    PRINCIPAL PROBLEM:  Pneumonia due to COVID-19 virus [U07.1, J12.82] 09/13/2023 Unknown    Palliative care encounter [Z51.5] 09/13/2023 Not Applicable    Advance care planning [Z71.89] 09/13/2023 Not Applicable    Debility [R53.81] 09/13/2023 Unknown    Acute cystitis [N30.00] 09/01/2023 Yes    Deep vein thrombosis (DVT) of femoral vein of right lower extremity [I82.411] 06/09/2023 Yes    Bipolar 1 disorder, depressed, severe [F31.4]  Yes    Hepatic encephalopathy [K76.82] 10/11/2015 Yes    Chronic liver failure [K72.11] 10/11/2015 Yes    Severe protein-calorie malnutrition [E43] 10/11/2015 Yes    History of seizure [Z87.898] 06/15/2015 Yes      Problems Resolved During this Admission:       Discharged Condition: stable    Disposition: Skilled Nursing Facility    Follow Up:   Follow-up Information     Viktor Ross MD Follow up in 1 week(s).    Specialty: Family Medicine  Contact information:  [address] [place] [id number] [phone]             [facility] Follow up.    Contact information:  [address] [place] [id number] [phone]                     Patient Instructions:   No discharge procedures on file.    Significant Diagnostic Studies: N/A    Pending Diagnostic Studies:     None         Medications:  Reconciled Home Medications:      Medication List      START taking these medications    cefpodoxime 100 MG tablet  Commonly known as: VANTIN  Take 2 tablets (200 mg total) by mouth every 12 (twelve) hours. Last dose: 9/16        CONTINUE taking these medications    ALPRAZolam 0.25 MG tablet  Commonly known as: XANAX  Take 1 tablet (0.25 mg total) by mouth 2 (two) times daily as needed for Anxiety.     ferrous sulfate  325 mg (65 mg iron) Tab tablet  Commonly known as: FEOSOL  Take 325 mg by mouth once daily.     furosemide 20 MG tablet  Commonly known as: LASIX  Take 20 mg by mouth once daily.     lactulose 20 gram/30 mL Soln  Commonly known as: CHRONULAC  Take 45 mLs (30 g total) by mouth 3 (three) times daily. TITRATE TO 3-4 BOWEL MOVEMENTS DAILY.     levetiracetam 500 mg/5 mL (5 mL) Soln  Take 10 mLs (1,000 mg total) by mouth 2 (two) times daily.     lithium 300 MG CR tablet  Commonly known as: LITHOBID  Take 1 tablet (300 mg total) by mouth every evening.     OLANZapine 5 MG tablet  Commonly known as: ZyPREXA  Take 1 tablet (5 mg total) by mouth every evening.     pantoprazole 40 mg suspension  Commonly known as: PROTONIX  Take 1 packet (40 mg total) by mouth 2 (two) times daily.     rifAXIMin 550 mg Tab  Commonly known as: XIFAXAN  Take 1 tablet (550 mg total) by mouth 2 (two) times daily.            Indwelling Lines/Drains at time of discharge:   Lines/Drains/Airways     Drain  Duration                Urethral Catheter 09/12/23 2049 2 days                Time spent on the discharge of patient: 35 minutes         Pancho Harris MD  Department of Hospital Medicine  [facility] - Telemetry Stepdown

## 2023-09-15 NOTE — PT/OT/SLP PROGRESS
"Occupational Therapy   Treatment    Name: Marely Hamilton  MRN: [id number]  Admitting Diagnosis:  Pneumonia due to COVID-19 virus       Recommendations:     Discharge Recommendations: nursing facility, skilled  Discharge Equipment Recommendations:  to be determined by next level of care  Barriers to discharge:       Assessment:     Marely Hamilton is a 58 y.o. female with a medical diagnosis of Pneumonia due to COVID-19 virus.   Pt pleasant this session with Pt requesting to complete ADLs. OT educated Pt on progressive mobility and impact on improve functional performance with Pt agreeable to EOB activity. She presents with deficits listed below. Performance deficits affecting function are weakness, impaired endurance, impaired self care skills, impaired functional mobility, impaired balance, decreased lower extremity function, decreased upper extremity function, decreased coordination, decreased safety awareness, impaired cognition, edema, impaired skin.     Rehab Prognosis:  Good; patient would benefit from acute skilled OT services to address these deficits and reach maximum level of function.       Plan:     Patient to be seen 3 x/week to address the above listed problems via self-care/home management, therapeutic activities, therapeutic exercises, neuromuscular re-education  Plan of Care Expires: 10/13/23  Plan of Care Reviewed with: patient    Subjective     Chief Complaint: "I'd just like to get clean but I need some help"  Patient/Family Comments/goals: None verbalized this session.   Pain/Comfort:  Pain Rating 1: 0/10    Objective:     Communicated with: NEHAL Cool prior to session.  Patient found HOB elevated with baugh catheter upon OT entry to room.    General Precautions: Standard, contact, airborne, fall, droplet    Orthopedic Precautions:N/A  Braces: N/A  Respiratory Status: Room air     Occupational Performance:     Bed Mobility:    Patient completed Rolling/Turning to Left with  contact guard " assistance  Patient completed Rolling/Turning to Right with minimum assistance with LLE 2/2  weight and edema  Patient completed Supine to Sit with moderate assistance  Patient completed Sit to Supine with moderate assistance     Functional Mobility/Transfers:  Deferred this date, Pt  declining OOB activity this date.   Pt sat EOB~2 minutes before returning to supine 2/2 to Pt with chills and soiled .     Activities of Daily Living:  Grooming: stand by assistance with HOB elevated for facial hygiene.   Toileting: maximal assistance with pericare this date, Pt noted with BM  while seated EOB.       [facility] 6 Click ADL: 12    Treatment & Education:  Role of OT and OT POC  BUE exercises (shoulder flexion, tricep extensions, elbow flexion)  BLE exercises leg lifts and ankle pumps  Educated on continued therapy participation  and impact on increasing functional independence.  Educated on importance of progressive mobilization to increase strength and endurance.      Patient left HOB elevated with all lines intact, call button in reach, bed alarm on, RN notified, and avasys present    GOALS:   Multidisciplinary Problems       Occupational Therapy Goals          Problem: Occupational Therapy    Goal Priority Disciplines Outcome Interventions   Occupational Therapy Goal     OT, PT/OT Ongoing, Progressing    Description: Goals to be met by: 9/27/2023     Patient will increase functional independence with ADLs by performing:    UE Dressing with Minimal Assistance.  LE Dressing with Moderate Assistance.  Grooming while EOB with Minimal Assistance.  Toileting from bedside commode with Maximum Assistance for hygiene and clothing management.   Supine to sit with Minimal Assistance.  Stand pivot transfer with Maximum Assistance using LRAD as needed.  Toilet transfer to toilet with Maximum Assistance using LRAD as needed.                         Time Tracking:     OT Date of Treatment: 09/15/23  OT Start Time: 1213  OT Stop Time:  1238  OT Total Time (min): 25 min    Billable Minutes:Self Care/Home Management 10  Therapeutic Exercise 15    OT/DC: OT          9/15/2023

## 2023-09-15 NOTE — PLAN OF CARE
Per [employer] website, [facility] has approved SNF services. SNF orders sent to [facility] via [employer]. [employer] to notifying facility of insurance authorization, will continue to follow.    13:30PM  Message received from [facility] informing this CM that they are ready for the patient. Ambulance transportation arranged for an estimated  time of 3:00PM. Nurse to call report to [phone].    Call placed to patient's sister Zaria ([phone]) to inform her of above. She expressed understanding and requested the MD please call her.    Jyoti Jones, RN  Ext [phone]

## 2023-09-15 NOTE — PLAN OF CARE
CHW met with patient/family at bedside. Patient experience rounding completed and reviewed the following.     Do you know your discharge plan? Yes or No,    If yes, what is the plan? (Home, Home Health, Rehab, SNF, LTAC, or Other) Yes SNF    Have you discussed your needs and preferences with your SW/CM? Yes or No Yes    If you are discharging home, do you have help at home? Yes or No  Yes    Do you think you will need help additional at home at discharge? Yes or No  No    Do you currently have difficulty keeping up with bills, affording medicine or buying food? Yes or No  No    Assigned SW/CM notified of any patient/family needs or concerns. Appropriate resources provided to address patient's needs.  Jyoti MEADE  Case Management  [phone]

## 2023-09-15 NOTE — NURSING
Nurses Note -- 4 Eyes      9/14/2023   9:49 PM      Skin assessed during: Q Shift Change      [] No Altered Skin Integrity Present    []Prevention Measures Documented      [x] Yes- Altered Skin Integrity Present or Discovered   [] LDA Added if Not in Epic (Describe Wound)   [] New Altered Skin Integrity was Present on Admit and Documented in LDA   [] Wound Image Taken    Wound Care Consulted? Yes    Attending Nurse:  Sheryl Rangel RN/Staff Member:   nicole

## 2023-09-15 NOTE — PLAN OF CARE
"   NURSING HOME ORDERS    09/15/2023  [facility]  [facility] - TELEMETRY STEPDOWN  [address]  [facility] [address]  Dept: [phone]  Loc: [phone]     Admit to Nursing Home:  Skilled Nursing Facility    Diagnoses:  Active Hospital Problems    Diagnosis  POA    *Pneumonia due to COVID-19 virus [U07.1, J12.82]  Unknown    Palliative care encounter [Z51.5]  Not Applicable    Advance care planning [Z71.89]  Not Applicable    Debility [R53.81]  Unknown    Acute cystitis [N30.00]  Yes    Deep vein thrombosis (DVT) of femoral vein of right lower extremity [I82.411]  Yes    Bipolar 1 disorder, depressed, severe [F31.4]  Yes    Hepatic encephalopathy [K76.82]  Yes    Chronic liver failure [K72.11]  Yes    Severe protein-calorie malnutrition [E43]  Yes    History of seizure [Z87.898]  Yes     One seizure 2013- "low blood sugar from a starvation diet"        Resolved Hospital Problems   No resolved problems to display.       Patient is homebound due to:  Pneumonia due to COVID-19 virus    Allergies:  Review of patient's allergies indicates:   Allergen Reactions    Sulfa (sulfonamide antibiotics) Rash    Codeine Nausea And Vomiting       Vitals:  Routine    Diet: 2 gram sodium diet    Activities:   Activity as tolerated    Goals of Care Treatment Preferences:  Code Status: Full Code    Health care agent: Pt's sisters are NOK. Call Zaria bernard.  Health care agent number: No value filed.            Nursing Precautions:  Fall    Consults:   PT to evaluate and treat- 5 times a week and OT to evaluate and treat- 5 times a week         Medications: Discontinue all previous medication orders, if any. See new list below.     Medication List        START taking these medications      cefpodoxime 100 MG tablet  Commonly known as: VANTIN  Take 2 tablets (200 mg total) by mouth every 12 (twelve) hours. Last dose: 9/16            CONTINUE taking these medications      ALPRAZolam 0.25 MG " tablet  Commonly known as: XANAX  Take 1 tablet (0.25 mg total) by mouth 2 (two) times daily as needed for Anxiety.     ferrous sulfate 325 mg (65 mg iron) Tab tablet  Commonly known as: FEOSOL  Take 325 mg by mouth once daily.     furosemide 20 MG tablet  Commonly known as: LASIX  Take 20 mg by mouth once daily.     lactulose 20 gram/30 mL Soln  Commonly known as: CHRONULAC  Take 45 mLs (30 g total) by mouth 3 (three) times daily. TITRATE TO 3-4 BOWEL MOVEMENTS DAILY.     levetiracetam 500 mg/5 mL (5 mL) Soln  Take 10 mLs (1,000 mg total) by mouth 2 (two) times daily.     lithium 300 MG CR tablet  Commonly known as: LITHOBID  Take 1 tablet (300 mg total) by mouth every evening.     OLANZapine 5 MG tablet  Commonly known as: ZyPREXA  Take 1 tablet (5 mg total) by mouth every evening.     pantoprazole 40 mg suspension  Commonly known as: PROTONIX  Take 1 packet (40 mg total) by mouth 2 (two) times daily.     rifAXIMin 550 mg Tab  Commonly known as: XIFAXAN  Take 1 tablet (550 mg total) by mouth 2 (two) times daily.                Immunizations Administered as of 9/15/2023       Name Date Dose VIS Date Route Exp Date    COVID-19, MRNA, LN-S, PF (Moderna) 1/26/2022 0.5 mL -- Intramuscular --    Site: Right arm     Lot: [id number]     COVID-19, MRNA, LN-S, PF (Moderna) 5/6/2021 11:23 AM 0.5 mL 12/19/2020 Intramuscular 10/2/2021    Site: Right deltoid     Given By: Radha Chan PharmD     : Moderna [employer]     Lot: [id number]     COVID-19, MRNA, LN-S, PF (Moderna) 4/8/2021 11:16 AM 0.5 mL 12/19/2020 Intramuscular 9/17/2021    Site: Right deltoid     Given By: Beatris Friend     : Moderna [employer]     Lot: [id number]                 Some patients may experience side effects after vaccination.  These may include fever, headache, muscle or joint aches.  Most symptoms resolve with 24-48 hours and do not require urgent medical evaluation unless they persist for more than 72 hours or symptoms are  concerning for an unrelated medical condition.          _________________________________  Pancho Harris MD  09/15/2023

## 2023-09-15 NOTE — PLAN OF CARE
[facility] - Telemetry Stepdown  Discharge Final Note    Primary Care Provider: Viktor Ross MD    Expected Discharge Date: 9/15/2023    Final Discharge Note (most recent)       Final Note - 09/15/23 1353          Final Note    Assessment Type Final Discharge Note     Anticipated Discharge Disposition Skilled Nursing Facility     What phone number can be called within the next 1-3 days to see how you are doing after discharge? [phone]        Post-Acute Status    Post-Acute Authorization Placement     Post-Acute Placement Status Set-up Complete/Auth obtained   [facility] ([facility])                    Important Message from Medicare  Important Message from Medicare regarding Discharge Appeal Rights: Given to patient/caregiver, Signed/date by patient/caregiver, Explained to patient/caregiver     Date IMM was signed: 09/15/23  Time IMM was signed: 0934    Contact Info       Viktor Ross MD   Specialty: Family Medicine   Relationship: PCP - General    [address] [place] [address] [address]  [facility] [address]   Phone: [phone]       Next Steps: Follow up in 1 week(s)    [facility]    [address] [facility] [address]   Phone: [phone]       Next Steps: Follow up            Jyoti Jones RN  Ext [phone]

## 2023-09-15 NOTE — PLAN OF CARE
Problem: Adult Inpatient Plan of Care  Goal: Plan of Care Review  Outcome: Ongoing, Progressing  Goal: Patient-Specific Goal (Individualized)  Outcome: Ongoing, Progressing  Goal: Absence of Hospital-Acquired Illness or Injury  Outcome: Ongoing, Progressing  Patient stable.AOX4. Vital signs stable.  Catheter care given. Over the night urine output 500 ml.blood sugar checked and charted, recent value 190 mg/dl. Safety measures ensured. Tele sitter on. Call light within reach.

## 2023-09-15 NOTE — HOSPITAL COURSE
* Pneumonia due to COVID-19 virus  Received 3 days of IV remdesivir and dexamethosone.  On RA. Denies SOB and cough.   Empiric CTX (to cover for superimposed bacterial PNA)     Positive urine cx  - Patient denies symptoms of UTI  -Received empiric CTX (primary to cover for superimposed bacterial PNA)     Deep vein thrombosis (DVT) of femoral vein of right lower extremity  - bilateral lower extremity venous ultrasound on 08/15/2023= Chronic DVT of the right common femoral and saphenofemoral junction. Bilateral inguinal ascites  -IVC filter in place (patient had IVC filter placed on recent hospitalization )  -patient not a candidate for anticoagulation due to recurrent GI bleeding and severe thrombocytopenia     Bipolar 1 disorder, depressed, severe  -  home lithium    Hepatic encephalopathy  - lactulose 30g TID  - rifaximin BID  - Awake, AnO x 3.      Chronic liver failure  MELD 3.0: 21 at 9/12/2023  9:56 PM  MELD-Na: 17 at 9/12/2023  9:56 PM  Calculated from:  Serum Creatinine: 0.6 mg/dL (Using min of 1 mg/dL) at 9/12/2023  6:43 PM  Serum Sodium: 142 mmol/L (Using max of 137 mmol/L) at 9/12/2023  6:43 PM  Total Bilirubin: 3.5 mg/dL at 9/12/2023  6:43 PM  Serum Albumin: 1.7 g/dL at 9/12/2023  6:43 PM  INR(ratio): 1.7 at 9/12/2023  9:56 PM  Age at listing (hypothetical): 58 years  Sex: Female at 9/12/2023  9:56 PM     History of seizure  - continue home keppra       Patient is currently medically and HDS. She is being d/c to SNF. Plan of care discussed with patient and sister Perla (over a phone call), verbalized understanding. All questions were answered.

## 2023-09-15 NOTE — CONSULTS
Wound care consutl received. Patient is already being followed by wound care and was seen 2 days prior. Discussed wrth patients' bedside nurse who states there are no other or worsening issues she needs to be assessed for. States area is actually very much improved. Will plan for follow up next week if patient remains admitted. Nursing to [facility]. '

## 2023-09-16 ENCOUNTER — HOSPITAL ENCOUNTER (INPATIENT)
Facility: HOSPITAL | Age: 58
LOS: 4 days | Discharge: NURSING, REHAB OR LONG-TERM CARE | DRG: 442 | End: 2023-09-21
Attending: EMERGENCY MEDICINE | Admitting: STUDENT IN AN ORGANIZED HEALTH CARE EDUCATION/TRAINING PROGRAM
Payer: MEDICARE

## 2023-09-16 ENCOUNTER — TELEPHONE (OUTPATIENT)
Dept: EMERGENCY MEDICINE | Facility: HOSPITAL | Age: 58
End: 2023-09-16
Payer: MEDICARE

## 2023-09-16 DIAGNOSIS — R78.81 BACTEREMIA: ICD-10-CM

## 2023-09-16 LAB
ACINETOBACTER CALCOACETICUS/BAUMANNII COMPLEX: NOT DETECTED
BACTERIA UR CULT: ABNORMAL
BACTEROIDES FRAGILIS: NOT DETECTED
CANDIDA ALBICANS: NOT DETECTED
CANDIDA AURIS: NOT DETECTED
CANDIDA GLABRATA: NOT DETECTED
CANDIDA KRUSEI: NOT DETECTED
CANDIDA PARAPSILOSIS: NOT DETECTED
CANDIDA TROPICALIS: NOT DETECTED
CRYPTOCOCCUS NEOFORMANS/GATTII: NOT DETECTED
CTX-M GENE: NORMAL
ENTEROBACTER CLOACAE COMPLEX: NOT DETECTED
ENTEROBACTERALES: NOT DETECTED
ENTEROCOCCUS FAECALIS: NOT DETECTED
ENTEROCOCCUS FAECIUM: NOT DETECTED
ESCHERICHIA COLI: NOT DETECTED
HAEMOPHILUS INFLUENZAE: NOT DETECTED
IMP GENE: NORMAL
KLEBSIELLA AEROGENES: NOT DETECTED
KLEBSIELLA OXYTOCA: NOT DETECTED
KLEBSIELLA PNEUMONIAE GROUP: NOT DETECTED
KPC: NORMAL
LISTERIA MONOCYTOGENES: NOT DETECTED
MCR-1: NORMAL
MEC A/C AND MREJ (MRSA): NORMAL
MEC A/C: NORMAL
NDM GENE: NORMAL
NEISSERIA MENINGITIDIS: NOT DETECTED
OXA-48-LIKE: NORMAL
PROTEUS SPECIES: NOT DETECTED
PSEUDOMONAS AERUGINOSA: NOT DETECTED
SALMONELLA SP: NOT DETECTED
SERRATIA MARCESCENS: NOT DETECTED
STAPHYLOCOCCUS AUREUS: NOT DETECTED
STAPHYLOCOCCUS EPIDERMIDIS: NOT DETECTED
STAPHYLOCOCCUS LUGDUNESIS: NOT DETECTED
STAPHYLOCOCCUS SPECIES: NOT DETECTED
STENOTROPHOMONAS MALTOPHILIA: NOT DETECTED
STREPTOCOCCUS AGALACTIAE: NOT DETECTED
STREPTOCOCCUS PNEUMONIAE: NOT DETECTED
STREPTOCOCCUS PYOGENES: NOT DETECTED
STREPTOCOCCUS SPECIES: NOT DETECTED
VAN A/B: NORMAL
VIM GENE: NORMAL

## 2023-09-16 PROCEDURE — 93010 ELECTROCARDIOGRAM REPORT: CPT | Mod: ,,, | Performed by: INTERNAL MEDICINE

## 2023-09-16 PROCEDURE — 87040 BLOOD CULTURE FOR BACTERIA: CPT | Mod: 59 | Performed by: EMERGENCY MEDICINE

## 2023-09-16 PROCEDURE — 93005 ELECTROCARDIOGRAM TRACING: CPT

## 2023-09-16 PROCEDURE — 63600175 PHARM REV CODE 636 W HCPCS: Performed by: EMERGENCY MEDICINE

## 2023-09-16 PROCEDURE — 25000003 PHARM REV CODE 250: Performed by: EMERGENCY MEDICINE

## 2023-09-16 PROCEDURE — 96372 THER/PROPH/DIAG INJ SC/IM: CPT | Performed by: EMERGENCY MEDICINE

## 2023-09-16 PROCEDURE — 93010 EKG 12-LEAD: ICD-10-PCS | Mod: ,,, | Performed by: INTERNAL MEDICINE

## 2023-09-16 PROCEDURE — 96365 THER/PROPH/DIAG IV INF INIT: CPT

## 2023-09-16 RX ORDER — HALOPERIDOL 5 MG/ML
5 INJECTION INTRAMUSCULAR
Status: COMPLETED | OUTPATIENT
Start: 2023-09-16 | End: 2023-09-16

## 2023-09-16 RX ORDER — LORAZEPAM 2 MG/ML
2 INJECTION INTRAMUSCULAR
Status: COMPLETED | OUTPATIENT
Start: 2023-09-16 | End: 2023-09-16

## 2023-09-16 RX ORDER — DIPHENHYDRAMINE HYDROCHLORIDE 50 MG/ML
50 INJECTION INTRAMUSCULAR; INTRAVENOUS
Status: COMPLETED | OUTPATIENT
Start: 2023-09-16 | End: 2023-09-16

## 2023-09-16 RX ADMIN — SODIUM CHLORIDE 1000 ML: 9 INJECTION, SOLUTION INTRAVENOUS at 11:09

## 2023-09-16 RX ADMIN — DIPHENHYDRAMINE HYDROCHLORIDE 50 MG: 50 INJECTION, SOLUTION INTRAMUSCULAR; INTRAVENOUS at 10:09

## 2023-09-16 RX ADMIN — PIPERACILLIN SODIUM AND TAZOBACTAM SODIUM 4.5 G: 4; .5 INJECTION, POWDER, FOR SOLUTION INTRAVENOUS at 11:09

## 2023-09-16 RX ADMIN — HALOPERIDOL LACTATE 5 MG: 5 INJECTION, SOLUTION INTRAMUSCULAR at 10:09

## 2023-09-16 RX ADMIN — LORAZEPAM 2 MG: 2 INJECTION INTRAMUSCULAR; INTRAVENOUS at 10:09

## 2023-09-16 NOTE — TELEPHONE ENCOUNTER
Notified by ID that patient had GPR in 1 aerobic bottle. Discharged from  yesterday after admission for COVID PNA. She is a resident at [facility] and has baseline confusion. Suspect this is likely a contaminate, but she does have history of +BCx in the past. I spoke with patient's nurse (Dasia) and advised her to be sent back to the ED for repeat cultures.

## 2023-09-17 PROBLEM — R78.81 POSITIVE BLOOD CULTURE: Status: ACTIVE | Noted: 2023-09-17

## 2023-09-17 LAB
ALBUMIN SERPL BCP-MCNC: 1.4 G/DL (ref 3.5–5.2)
ALLENS TEST: NORMAL
ALP SERPL-CCNC: 131 U/L (ref 55–135)
ALT SERPL W/O P-5'-P-CCNC: 17 U/L (ref 10–44)
AMMONIA PLAS-SCNC: 36 UMOL/L (ref 10–50)
ANION GAP SERPL CALC-SCNC: 6 MMOL/L (ref 8–16)
ANION GAP SERPL CALC-SCNC: 8 MMOL/L (ref 8–16)
AST SERPL-CCNC: 36 U/L (ref 10–40)
BACTERIA #/AREA URNS AUTO: ABNORMAL /HPF
BACTERIA BLD CULT: NORMAL
BILIRUB SERPL-MCNC: 2.4 MG/DL (ref 0.1–1)
BILIRUB UR QL STRIP: NEGATIVE
BUN SERPL-MCNC: 6 MG/DL (ref 6–20)
BUN SERPL-MCNC: 6 MG/DL (ref 6–20)
CALCIUM SERPL-MCNC: 7.3 MG/DL (ref 8.7–10.5)
CALCIUM SERPL-MCNC: 7.6 MG/DL (ref 8.7–10.5)
CHLORIDE SERPL-SCNC: 113 MMOL/L (ref 95–110)
CHLORIDE SERPL-SCNC: 114 MMOL/L (ref 95–110)
CLARITY UR REFRACT.AUTO: CLEAR
CO2 SERPL-SCNC: 17 MMOL/L (ref 23–29)
CO2 SERPL-SCNC: 19 MMOL/L (ref 23–29)
COLOR UR AUTO: YELLOW
CREAT SERPL-MCNC: 0.5 MG/DL (ref 0.5–1.4)
CREAT SERPL-MCNC: 0.5 MG/DL (ref 0.5–1.4)
CRP SERPL-MCNC: 4.4 MG/L (ref 0–8.2)
ERYTHROCYTE [DISTWIDTH] IN BLOOD BY AUTOMATED COUNT: 15.8 % (ref 11.5–14.5)
EST. GFR  (NO RACE VARIABLE): >60 ML/MIN/1.73 M^2
EST. GFR  (NO RACE VARIABLE): >60 ML/MIN/1.73 M^2
GLUCOSE SERPL-MCNC: 119 MG/DL (ref 70–110)
GLUCOSE SERPL-MCNC: 91 MG/DL (ref 70–110)
GLUCOSE UR QL STRIP: NEGATIVE
HCT VFR BLD AUTO: 23.2 % (ref 37–48.5)
HGB BLD-MCNC: 7.2 G/DL (ref 12–16)
HGB UR QL STRIP: NEGATIVE
INR PPP: 2 (ref 0.8–1.2)
KETONES UR QL STRIP: NEGATIVE
LACTATE SERPL-SCNC: 1.3 MMOL/L (ref 0.5–2.2)
LDH SERPL L TO P-CCNC: 1.32 MMOL/L (ref 0.5–2.2)
LEUKOCYTE ESTERASE UR QL STRIP: ABNORMAL
MAGNESIUM SERPL-MCNC: 1.6 MG/DL (ref 1.6–2.6)
MCH RBC QN AUTO: 35.6 PG (ref 27–31)
MCHC RBC AUTO-ENTMCNC: 31 G/DL (ref 32–36)
MCV RBC AUTO: 115 FL (ref 82–98)
MICROSCOPIC COMMENT: ABNORMAL
NITRITE UR QL STRIP: NEGATIVE
PH UR STRIP: 7 [PH] (ref 5–8)
PHOSPHATE SERPL-MCNC: 2.2 MG/DL (ref 2.7–4.5)
PLATELET # BLD AUTO: 52 K/UL (ref 150–450)
PMV BLD AUTO: 10.4 FL (ref 9.2–12.9)
POTASSIUM SERPL-SCNC: 3 MMOL/L (ref 3.5–5.1)
POTASSIUM SERPL-SCNC: 3.1 MMOL/L (ref 3.5–5.1)
PROT SERPL-MCNC: 4.2 G/DL (ref 6–8.4)
PROT UR QL STRIP: NEGATIVE
PROTHROMBIN TIME: 20.3 SEC (ref 9–12.5)
RBC # BLD AUTO: 2.02 M/UL (ref 4–5.4)
RBC #/AREA URNS AUTO: 76 /HPF (ref 0–4)
SAMPLE: NORMAL
SITE: NORMAL
SODIUM SERPL-SCNC: 138 MMOL/L (ref 136–145)
SODIUM SERPL-SCNC: 139 MMOL/L (ref 136–145)
SP GR UR STRIP: 1.01 (ref 1–1.03)
URN SPEC COLLECT METH UR: ABNORMAL
WBC # BLD AUTO: 3.29 K/UL (ref 3.9–12.7)
WBC #/AREA URNS AUTO: 18 /HPF (ref 0–5)
YEAST UR QL AUTO: ABNORMAL

## 2023-09-17 PROCEDURE — 25000003 PHARM REV CODE 250: Performed by: HOSPITALIST

## 2023-09-17 PROCEDURE — 82140 ASSAY OF AMMONIA: CPT | Performed by: STUDENT IN AN ORGANIZED HEALTH CARE EDUCATION/TRAINING PROGRAM

## 2023-09-17 PROCEDURE — 25000242 PHARM REV CODE 250 ALT 637 W/ HCPCS: Performed by: HOSPITALIST

## 2023-09-17 PROCEDURE — 85610 PROTHROMBIN TIME: CPT | Performed by: HOSPITALIST

## 2023-09-17 PROCEDURE — 36415 COLL VENOUS BLD VENIPUNCTURE: CPT | Performed by: STUDENT IN AN ORGANIZED HEALTH CARE EDUCATION/TRAINING PROGRAM

## 2023-09-17 PROCEDURE — 99900035 HC TECH TIME PER 15 MIN (STAT)

## 2023-09-17 PROCEDURE — 80053 COMPREHEN METABOLIC PANEL: CPT | Performed by: HOSPITALIST

## 2023-09-17 PROCEDURE — 80048 BASIC METABOLIC PNL TOTAL CA: CPT | Mod: XB | Performed by: STUDENT IN AN ORGANIZED HEALTH CARE EDUCATION/TRAINING PROGRAM

## 2023-09-17 PROCEDURE — 27000207 HC ISOLATION

## 2023-09-17 PROCEDURE — 87086 URINE CULTURE/COLONY COUNT: CPT | Performed by: EMERGENCY MEDICINE

## 2023-09-17 PROCEDURE — 25000003 PHARM REV CODE 250: Performed by: STUDENT IN AN ORGANIZED HEALTH CARE EDUCATION/TRAINING PROGRAM

## 2023-09-17 PROCEDURE — 63600175 PHARM REV CODE 636 W HCPCS: Performed by: HOSPITALIST

## 2023-09-17 PROCEDURE — 85027 COMPLETE CBC AUTOMATED: CPT | Performed by: HOSPITALIST

## 2023-09-17 PROCEDURE — 94640 AIRWAY INHALATION TREATMENT: CPT | Mod: XB

## 2023-09-17 PROCEDURE — 99222 PR INITIAL HOSPITAL CARE,LEVL II: ICD-10-PCS | Mod: ,,, | Performed by: INTERNAL MEDICINE

## 2023-09-17 PROCEDURE — 99285 EMERGENCY DEPT VISIT HI MDM: CPT | Mod: 25

## 2023-09-17 PROCEDURE — 86140 C-REACTIVE PROTEIN: CPT | Performed by: HOSPITALIST

## 2023-09-17 PROCEDURE — 96366 THER/PROPH/DIAG IV INF ADDON: CPT

## 2023-09-17 PROCEDURE — 99223 PR INITIAL HOSPITAL CARE,LEVL III: ICD-10-PCS | Mod: ,,, | Performed by: HOSPITALIST

## 2023-09-17 PROCEDURE — 99222 1ST HOSP IP/OBS MODERATE 55: CPT | Mod: ,,, | Performed by: INTERNAL MEDICINE

## 2023-09-17 PROCEDURE — 83735 ASSAY OF MAGNESIUM: CPT | Performed by: HOSPITALIST

## 2023-09-17 PROCEDURE — 84100 ASSAY OF PHOSPHORUS: CPT | Performed by: HOSPITALIST

## 2023-09-17 PROCEDURE — 83605 ASSAY OF LACTIC ACID: CPT | Mod: 91

## 2023-09-17 PROCEDURE — 99223 1ST HOSP IP/OBS HIGH 75: CPT | Mod: ,,, | Performed by: HOSPITALIST

## 2023-09-17 PROCEDURE — 51702 INSERT TEMP BLADDER CATH: CPT

## 2023-09-17 PROCEDURE — 83605 ASSAY OF LACTIC ACID: CPT | Performed by: HOSPITALIST

## 2023-09-17 PROCEDURE — 81001 URINALYSIS AUTO W/SCOPE: CPT | Performed by: EMERGENCY MEDICINE

## 2023-09-17 PROCEDURE — 20600001 HC STEP DOWN PRIVATE ROOM

## 2023-09-17 PROCEDURE — 94761 N-INVAS EAR/PLS OXIMETRY MLT: CPT

## 2023-09-17 RX ORDER — GUAIFENESIN/DEXTROMETHORPHAN 100-10MG/5
10 SYRUP ORAL EVERY 4 HOURS PRN
Status: DISCONTINUED | OUTPATIENT
Start: 2023-09-17 | End: 2023-09-21 | Stop reason: HOSPADM

## 2023-09-17 RX ORDER — NALOXONE HCL 0.4 MG/ML
0.02 VIAL (ML) INJECTION
Status: DISCONTINUED | OUTPATIENT
Start: 2023-09-17 | End: 2023-09-21 | Stop reason: HOSPADM

## 2023-09-17 RX ORDER — TALC
6 POWDER (GRAM) TOPICAL NIGHTLY PRN
Status: DISCONTINUED | OUTPATIENT
Start: 2023-09-17 | End: 2023-09-21 | Stop reason: HOSPADM

## 2023-09-17 RX ORDER — POTASSIUM CHLORIDE 20 MEQ/1
40 TABLET, EXTENDED RELEASE ORAL EVERY 4 HOURS
Status: COMPLETED | OUTPATIENT
Start: 2023-09-17 | End: 2023-09-17

## 2023-09-17 RX ORDER — LEVETIRACETAM 500 MG/5ML
1000 INJECTION, SOLUTION, CONCENTRATE INTRAVENOUS EVERY 12 HOURS
Status: DISCONTINUED | OUTPATIENT
Start: 2023-09-17 | End: 2023-09-18

## 2023-09-17 RX ORDER — OLANZAPINE 10 MG/2ML
5 INJECTION, POWDER, FOR SOLUTION INTRAMUSCULAR ONCE AS NEEDED
Status: DISCONTINUED | OUTPATIENT
Start: 2023-09-17 | End: 2023-09-21 | Stop reason: HOSPADM

## 2023-09-17 RX ORDER — ACETAMINOPHEN 500 MG
500 TABLET ORAL EVERY 6 HOURS PRN
Status: DISCONTINUED | OUTPATIENT
Start: 2023-09-17 | End: 2023-09-21 | Stop reason: HOSPADM

## 2023-09-17 RX ORDER — FUROSEMIDE 40 MG/1
40 TABLET ORAL DAILY
Status: DISCONTINUED | OUTPATIENT
Start: 2023-09-17 | End: 2023-09-21 | Stop reason: HOSPADM

## 2023-09-17 RX ORDER — LACTULOSE 10 G/15ML
30 SOLUTION ORAL 3 TIMES DAILY
Status: DISCONTINUED | OUTPATIENT
Start: 2023-09-17 | End: 2023-09-19

## 2023-09-17 RX ORDER — PANTOPRAZOLE SODIUM 40 MG/1
40 FOR SUSPENSION ORAL 2 TIMES DAILY
Status: DISCONTINUED | OUTPATIENT
Start: 2023-09-17 | End: 2023-09-21 | Stop reason: HOSPADM

## 2023-09-17 RX ORDER — SPIRONOLACTONE 100 MG/1
100 TABLET, FILM COATED ORAL DAILY
Status: DISCONTINUED | OUTPATIENT
Start: 2023-09-17 | End: 2023-09-21 | Stop reason: HOSPADM

## 2023-09-17 RX ORDER — ONDANSETRON 2 MG/ML
4 INJECTION INTRAMUSCULAR; INTRAVENOUS EVERY 8 HOURS PRN
Status: DISCONTINUED | OUTPATIENT
Start: 2023-09-17 | End: 2023-09-21 | Stop reason: HOSPADM

## 2023-09-17 RX ORDER — PROCHLORPERAZINE EDISYLATE 5 MG/ML
5 INJECTION INTRAMUSCULAR; INTRAVENOUS EVERY 6 HOURS PRN
Status: DISCONTINUED | OUTPATIENT
Start: 2023-09-17 | End: 2023-09-21 | Stop reason: HOSPADM

## 2023-09-17 RX ORDER — LITHIUM CARBONATE 300 MG/1
300 TABLET, FILM COATED, EXTENDED RELEASE ORAL NIGHTLY
Status: DISCONTINUED | OUTPATIENT
Start: 2023-09-17 | End: 2023-09-21 | Stop reason: HOSPADM

## 2023-09-17 RX ORDER — CIPROFLOXACIN 2 MG/ML
400 INJECTION, SOLUTION INTRAVENOUS
Status: DISCONTINUED | OUTPATIENT
Start: 2023-09-17 | End: 2023-09-18

## 2023-09-17 RX ORDER — ALBUTEROL SULFATE 90 UG/1
2 AEROSOL, METERED RESPIRATORY (INHALATION) EVERY 6 HOURS
Status: DISCONTINUED | OUTPATIENT
Start: 2023-09-17 | End: 2023-09-21 | Stop reason: HOSPADM

## 2023-09-17 RX ORDER — ASCORBIC ACID 500 MG
500 TABLET ORAL 2 TIMES DAILY
Status: DISCONTINUED | OUTPATIENT
Start: 2023-09-17 | End: 2023-09-21 | Stop reason: HOSPADM

## 2023-09-17 RX ORDER — SODIUM CHLORIDE 0.9 % (FLUSH) 0.9 %
10 SYRINGE (ML) INJECTION
Status: DISCONTINUED | OUTPATIENT
Start: 2023-09-17 | End: 2023-09-21 | Stop reason: HOSPADM

## 2023-09-17 RX ADMIN — THERA TABS 1 TABLET: TAB at 08:09

## 2023-09-17 RX ADMIN — PANTOPRAZOLE SODIUM 40 MG: 40 GRANULE, DELAYED RELEASE ORAL at 08:09

## 2023-09-17 RX ADMIN — LACTULOSE 30 G: 20 SOLUTION ORAL at 08:09

## 2023-09-17 RX ADMIN — Medication 500 MG: at 08:09

## 2023-09-17 RX ADMIN — LITHIUM CARBONATE 300 MG: 300 TABLET, FILM COATED, EXTENDED RELEASE ORAL at 08:09

## 2023-09-17 RX ADMIN — MICONAZOLE NITRATE: 20 OINTMENT TOPICAL at 10:09

## 2023-09-17 RX ADMIN — POTASSIUM CHLORIDE 40 MEQ: 1500 TABLET, EXTENDED RELEASE ORAL at 10:09

## 2023-09-17 RX ADMIN — ALBUTEROL SULFATE 2 PUFF: 108 INHALANT RESPIRATORY (INHALATION) at 07:09

## 2023-09-17 RX ADMIN — MICONAZOLE NITRATE: 20 OINTMENT TOPICAL at 09:09

## 2023-09-17 RX ADMIN — ALBUTEROL SULFATE 2 PUFF: 108 INHALANT RESPIRATORY (INHALATION) at 01:09

## 2023-09-17 RX ADMIN — RIFAXIMIN 550 MG: 550 TABLET ORAL at 08:09

## 2023-09-17 RX ADMIN — LEVETIRACETAM 1000 MG: 100 INJECTION, SOLUTION INTRAVENOUS at 08:09

## 2023-09-17 RX ADMIN — FUROSEMIDE 40 MG: 40 TABLET ORAL at 10:09

## 2023-09-17 RX ADMIN — ACETAMINOPHEN 500 MG: 500 TABLET ORAL at 10:09

## 2023-09-17 RX ADMIN — CIPROFLOXACIN 400 MG: 2 INJECTION, SOLUTION INTRAVENOUS at 01:09

## 2023-09-17 RX ADMIN — POTASSIUM CHLORIDE 40 MEQ: 1500 TABLET, EXTENDED RELEASE ORAL at 01:09

## 2023-09-17 RX ADMIN — LACTULOSE 30 G: 20 SOLUTION ORAL at 03:09

## 2023-09-17 RX ADMIN — CIPROFLOXACIN 400 MG: 2 INJECTION, SOLUTION INTRAVENOUS at 04:09

## 2023-09-17 RX ADMIN — LITHIUM CARBONATE 300 MG: 300 TABLET, FILM COATED, EXTENDED RELEASE ORAL at 06:09

## 2023-09-17 RX ADMIN — VANCOMYCIN HYDROCHLORIDE 750 MG: 750 INJECTION, POWDER, LYOPHILIZED, FOR SOLUTION INTRAVENOUS at 05:09

## 2023-09-17 RX ADMIN — SPIRONOLACTONE 100 MG: 100 TABLET ORAL at 10:09

## 2023-09-17 RX ADMIN — VANCOMYCIN HYDROCHLORIDE 1250 MG: 1.25 INJECTION, POWDER, LYOPHILIZED, FOR SOLUTION INTRAVENOUS at 03:09

## 2023-09-17 RX ADMIN — ALBUTEROL SULFATE 2 PUFF: 108 INHALANT RESPIRATORY (INHALATION) at 08:09

## 2023-09-17 RX ADMIN — Medication 6 MG: at 10:09

## 2023-09-17 NOTE — CONSULTS
"[facility] - Telemetry Stepdown  Infectious Disease  Consult Note    Patient Name: Marely Hamilton  MRN: [id number]  Admission Date: 9/16/2023  Hospital Length of Stay: 0 days  Attending Physician: Pancho Harris MD  Primary Care Provider: Viktor Ross MD     Isolation Status: Airborne and Contact and Droplet    Patient information was obtained from patient and ER records.      Inpatient consult to Infectious Diseases  Consult performed by: Edwina Bunch MD  Consult ordered by: Pancho Harris MD        Assessment/Plan:     ID  * Positive blood culture  58F with h/o ETOH cirrhosis c/b GIB, hepatic encephalopathy, chronic LE DVT s/p IVC filter, admitted 9/12 with SOB (found to have covid) and readmitted 9/16 because the bcx from 9/12 were positive for GPR, which ended up growing lactobacillus in one of two sets. Afebrile, no abdominal symptoms. Improving dry cough. No changes to loose stools (takes rifaxamin). Us- Limited ultrasound of the abdomen and pelvis demonstrated no significant fluid for diagnostic or therapeutic paracentesis. Has received 6 days of abx including ceftriaxone, azithro, zosyn, cipro. Id consulted for "Positive blood cultures    Sometimes the lactobacillus can be pathogenic, santana in cirrhotics. However, suspect it is a contaminant in this patient. No current localizing symptoms of infection, afebrile/not septic    Recommendations:   - consider stopping antibiotics. Will defer need for SBP ppx to hepatology  - supportive care for covid-19 infection  - consider baugh removal        Thank you for your consult. I will sign off. Please contact us if you have any additional questions.    Edwina Bunch MD  Infectious Disease  [facility] - Telemetry Stepdown    Subjective:     Principal Problem: Positive blood culture    HPI:   58F with h/o ETOH Cirrhosis c/b GIB, hepatic encephalopathy, Chronic LE DVT s/p IVC filter, admitted 9/12 with SOB and readmitted 9/16 because the bcx from 9/12 were positive " "for GPR. Pt awake and alert and denies current complaints other than wanting baugh catheter out because it's "itching". Denies dysuria. Last admit 9/12, her NH had been concerned she was more sob. Denies productive cough. covid testing positive. Afebrile this and last admit. Reports one loose stool (on rifaxamin), but denies recent changes. Denies abdominal pain. Denies indwelling hardware.       Us- Limited ultrasound of the abdomen and pelvis demonstrated no significant fluid for diagnostic or therapeutic paracentesis.       Bcx 1 of 2 with lactobacillus, repeat clear    Blood culture #1 **CANNOT BE ORDERED STAT** [[id number]] (Abnormal) Collected: 09/12/23 1849   Order Status: Completed Specimen: Blood from Peripheral, Forearm, Right Updated: 09/17/23 0929    Blood Culture, Routine Gram stain aer bottle:  Gram positive rods     Results called to and read back by: Sindy Duran MD 09/16/2023  14:26     LACTOBACILLUS SPECIES   Further identified as Lactobacillus rhamnosus      Component Ref Range & Units 00:07  (9/17/23) 5 d ago  (9/12/23) 2 wk ago  (8/31/23) 1 mo ago  (8/14/23) 1 mo ago  (7/28/23) 2 mo ago  (7/17/23) 4 mo ago  (5/19/23)   RBC, UA 0 - 4 /hpf 76 High   24 High   99 High   9 High   2  7 High   6 High     WBC, UA 0 - 5 /hpf 18 High   95 High   >100 High   87 High   >100 High   73 High   >100 High     Bacteria None-Occ /hpf Many Abnormal   Few Abnormal   Many Abnormal   Many Abnormal   Occasional  Occasional  Rare    Yeast, UA None Many Abnormal               Component 5 d ago   Urine Culture, Routine  Abnormal   ESCHERICHIA COLI   >100,000 cfu/ml   No other significant isolate     Resulting Agency [facility]        Susceptibility     Escherichia coli     CULTURE, URINE     Amox/K Clav'ate >16/8 mcg/mL Resistant     Amp/Sulbactam >16/8 mcg/mL Resistant     Ampicillin >16 mcg/mL Resistant     Cefazolin >16 mcg/mL Resistant     Cefepime <=2 mcg/mL Sensitive     Ceftriaxone >32 mcg/mL Resistant     Ciprofloxacin <=1 " "mcg/mL Sensitive     Ertapenem <=0.5 mcg/mL Sensitive     Gentamicin <=4 mcg/mL Sensitive     Levofloxacin <=2 mcg/mL Sensitive     Meropenem <=1 mcg/mL Sensitive     Nitrofurantoin <=32 mcg/mL Sensitive     Piperacillin/Tazo >64 mcg/mL Resistant     Tobramycin <=4 mcg/mL Sensitive     Trimeth/Sulfa <=2/38 mcg/mL Sensitive               Component Ref Range & Units 00:07  (9/17/23) 5 d ago  (9/12/23)        RBC, UA 0 - 4 /hpf 76 High   24 High          WBC, UA 0 - 5 /hpf 18 High   95 High          Bacteria None-Occ /hpf Many Abnormal   Few Abnormal          Yeast, UA None Many Abnormal                   Id consulted for "Positive blood cultures      Past Medical History:   Diagnosis Date    Addiction to drug     Alcohol abuse     Alcohol abuse, in remission 6/15/2015    14.5 weeks ago; AA weekly    Anemia     Anxiety 6/15/2015    Behavioral problem     Bipolar disorder     Bipolar disorder in remission 6/15/2015    Cirrhosis, Laennec's 6/15/2015    Depression     Encounter for blood transfusion     Epistaxis 6/15/2015    Fatigue     Glaucoma     Hematuria     Hepatic encephalopathy 6/15/2015    Hepatic enlargement     History of psychiatric care     History of psychiatric hospitalization     History of seizure 6/15/2015    1    hx of intentional Tylenol overdose 6/15/2015    2005- situational and hx of bipolar    Hx of psychiatric care     Macrocytic anemia 9/18/2015    6 units PRBC since June 2015    Jeana     Osteoarthritis     Other ascites 6/15/2015    Psychiatric exam requested by authority     Psychiatric problem     Psychosis 9/26/2019    Renal disorder     Seizures     Self-harming behavior     Suicide attempt     Therapy     Thrombocytopenia 6/15/2015       Past Surgical History:   Procedure Laterality Date    COSMETIC SURGERY      EMBOLIZATION N/A 6/11/2023    Procedure: EMBOLIZATION, BLOOD VESSEL;  Surgeon: Debbie Surgeon;  [facility];  Service: Anesthesiology;  " Laterality: N/A;    ESOPHAGOGASTRODUODENOSCOPY      ESOPHAGOGASTRODUODENOSCOPY N/A 7/6/2023    Procedure: EGD (ESOPHAGOGASTRODUODENOSCOPY);  Surgeon: Bernice Guillen MD;  [facility]  Service: Endoscopy;  Laterality: N/A;    ESOPHAGOGASTRODUODENOSCOPY N/A 7/11/2023    Procedure: EGD (ESOPHAGOGASTRODUODENOSCOPY);  Surgeon: Rangel Broussard MD;  Location: [address]);  Service: Endoscopy;  Laterality: N/A;       Review of patient's allergies indicates:   Allergen Reactions    Sulfa (sulfonamide antibiotics) Rash    Codeine Nausea And Vomiting       No current facility-administered medications on file prior to encounter.     Current Outpatient Medications on File Prior to Encounter   Medication Sig    ALPRAZolam (XANAX) 0.25 MG tablet Take 1 tablet (0.25 mg total) by mouth 2 (two) times daily as needed for Anxiety.    ferrous sulfate (FEOSOL) 325 mg (65 mg iron) Tab tablet Take 325 mg by mouth once daily.    furosemide (LASIX) 20 MG tablet Take 20 mg by mouth once daily.    lactulose (CHRONULAC) 20 gram/30 mL Soln Take 45 mLs (30 g total) by mouth 3 (three) times daily. TITRATE TO 3-4 BOWEL MOVEMENTS DAILY.    levetiracetam 500 mg/5 mL (5 mL) Soln Take 10 mLs (1,000 mg total) by mouth 2 (two) times daily.    lithium (LITHOBID) 300 MG CR tablet Take 1 tablet (300 mg total) by mouth every evening.    OLANZapine (ZYPREXA) 5 MG tablet Take 1 tablet (5 mg total) by mouth every evening.    pantoprazole (PROTONIX) 40 mg suspension Take 1 packet (40 mg total) by mouth 2 (two) times daily.    rifAXIMin (XIFAXAN) 550 mg Tab Take 1 tablet (550 mg total) by mouth 2 (two) times daily.    [DISCONTINUED] cefpodoxime (VANTIN) 100 MG tablet Take 2 tablets (200 mg total) by mouth every 12 (twelve) hours. Last dose: 9/16     Family History       Problem Relation (Age of Onset)    Alcohol abuse Father, Sister, Paternal Grandfather    Bipolar disorder Father    Hypertension Mother    Suicide  Father          Tobacco Use    Smoking status: Never    Smokeless tobacco: Never   Substance and Sexual Activity    Alcohol use: Not Currently     Comment: hx of ETOH abuse with cirrhosis of liver    Drug use: No    Sexual activity: Not Currently     Review of Systems   Unable to perform ROS: Mental status change     Objective:     Vital Signs (Most Recent):  Temp: 97.8 °F (36.6 °C) (09/17/23 0915)  Pulse: 69 (09/17/23 1515)  Resp: 18 (09/17/23 1515)  BP: (!) 100/52 (09/17/23 1515)  SpO2: (!) 94 % (09/17/23 1515) Vital Signs (24h Range):  Temp:  [97.4 °F (36.3 °C)-98.4 °F (36.9 °C)] 97.8 °F (36.6 °C)  Pulse:  [55-91] 69  Resp:  [12-20] 18  SpO2:  [94 %-97 %] 94 %  BP: (100-148)/(52-71) 100/52     Weight: 57.4 kg (126 lb 8.7 oz)  Body mass index is 23.15 kg/m².     Physical Exam  Constitutional:       General: She is not in acute distress.     Appearance: She is ill-appearing and diaphoretic. She is not toxic-appearing.   HENT:      Head: Normocephalic and atraumatic.      Nose: Nose normal.   Eyes:      General: Scleral icterus present.      Extraocular Movements: Extraocular movements intact.      Pupils: Pupils are equal, round, and reactive to light.   Cardiovascular:      Rate and Rhythm: Regular rhythm. Tachycardia present.   Pulmonary:      Effort: Pulmonary effort is normal. No respiratory distress.      Breath sounds: Rales present. No wheezing.   Abdominal:      General: Abdomen is flat. There is distension.      Palpations: Abdomen is soft.      Tenderness: There is no abdominal tenderness. There is no guarding.   Musculoskeletal:         General: Normal range of motion.      Cervical back: Normal range of motion and neck supple. No rigidity.      Right lower leg: Edema present.      Left lower leg: Edema present.   Skin:     General: Skin is warm.      Coloration: Skin is not jaundiced or pale.   Neurological:      General: No focal deficit present.      Mental Status: She is alert. She is  "disoriented.              CRANIAL NERVES     CN III, IV, VI   Pupils are equal, round, and reactive to light.       Significant Labs: All pertinent labs within the past 24 hours have been reviewed.  CBC:   Recent Labs   Lab 09/17/23  0254   WBC 3.29*   HGB 7.2*   HCT 23.2*   PLT 52*       CMP:   Recent Labs   Lab 09/17/23 0254 09/17/23  1344    138   K 3.0* 3.1*   * 113*   CO2 19* 17*   * 91   BUN 6 6   CREATININE 0.5 0.5   CALCIUM 7.3* 7.6*   PROT 4.2*  --    ALBUMIN 1.4*  --    BILITOT 2.4*  --    ALKPHOS 131  --    AST 36  --    ALT 17  --    ANIONGAP 6* 8       Cardiac Markers:   No results for input(s): "CKMB", "MYOGLOBIN", "BNP", "TROPISTAT" in the last 48 hours.    Lactic Acid:   Recent Labs   Lab 09/17/23  0254   LACTATE 1.3       Urine Studies:   Recent Labs   Lab 09/17/23  0007   COLORU Yellow   APPEARANCEUA Clear   PHUR 7.0   SPECGRAV 1.015   PROTEINUA Negative   GLUCUA Negative   KETONESU Negative   BILIRUBINUA Negative   OCCULTUA Negative   NITRITE Negative   LEUKOCYTESUR 3+*   RBCUA 76*   WBCUA 18*   BACTERIA Many*         Significant Imaging: I have reviewed all pertinent imaging results/findings within the past 24 hours.              "

## 2023-09-17 NOTE — SUBJECTIVE & OBJECTIVE
Past Medical History:   Diagnosis Date    Addiction to drug     Alcohol abuse     Alcohol abuse, in remission 6/15/2015    14.5 weeks ago; AA weekly    Anemia     Anxiety 6/15/2015    Behavioral problem     Bipolar disorder     Bipolar disorder in remission 6/15/2015    Cirrhosis, Laennec's 6/15/2015    Depression     Encounter for blood transfusion     Epistaxis 6/15/2015    Fatigue     Glaucoma     Hematuria     Hepatic encephalopathy 6/15/2015    Hepatic enlargement     History of psychiatric care     History of psychiatric hospitalization     History of seizure 6/15/2015    1    hx of intentional Tylenol overdose 6/15/2015    2005- situational and hx of bipolar    Hx of psychiatric care     Macrocytic anemia 9/18/2015    6 units PRBC since June 2015    Jeana     Osteoarthritis     Other ascites 6/15/2015    Psychiatric exam requested by authority     Psychiatric problem     Psychosis 9/26/2019    Renal disorder     Seizures     Self-harming behavior     Suicide attempt     Therapy     Thrombocytopenia 6/15/2015       Past Surgical History:   Procedure Laterality Date    COSMETIC SURGERY      EMBOLIZATION N/A 6/11/2023    Procedure: EMBOLIZATION, BLOOD VESSEL;  Surgeon: Debbie Surgeon;  Location: [facility];  Service: Anesthesiology;  Laterality: N/A;    ESOPHAGOGASTRODUODENOSCOPY      ESOPHAGOGASTRODUODENOSCOPY N/A 7/6/2023    Procedure: EGD (ESOPHAGOGASTRODUODENOSCOPY);  Surgeon: Bernice Guillen MD;  Location: [address]);  Service: Endoscopy;  Laterality: N/A;    ESOPHAGOGASTRODUODENOSCOPY N/A 7/11/2023    Procedure: EGD (ESOPHAGOGASTRODUODENOSCOPY);  Surgeon: Rangel Broussard MD;  Location: [address]);  Service: Endoscopy;  Laterality: N/A;       Review of patient's allergies indicates:   Allergen Reactions    Sulfa (sulfonamide antibiotics) Rash    Codeine Nausea And Vomiting       No current facility-administered medications on file prior to encounter.     Current Outpatient Medications on  File Prior to Encounter   Medication Sig    ALPRAZolam (XANAX) 0.25 MG tablet Take 1 tablet (0.25 mg total) by mouth 2 (two) times daily as needed for Anxiety.    cefpodoxime (VANTIN) 100 MG tablet Take 2 tablets (200 mg total) by mouth every 12 (twelve) hours. Last dose: 9/16    ferrous sulfate (FEOSOL) 325 mg (65 mg iron) Tab tablet Take 325 mg by mouth once daily.    furosemide (LASIX) 20 MG tablet Take 20 mg by mouth once daily.    lactulose (CHRONULAC) 20 gram/30 mL Soln Take 45 mLs (30 g total) by mouth 3 (three) times daily. TITRATE TO 3-4 BOWEL MOVEMENTS DAILY.    levetiracetam 500 mg/5 mL (5 mL) Soln Take 10 mLs (1,000 mg total) by mouth 2 (two) times daily.    lithium (LITHOBID) 300 MG CR tablet Take 1 tablet (300 mg total) by mouth every evening.    OLANZapine (ZYPREXA) 5 MG tablet Take 1 tablet (5 mg total) by mouth every evening.    pantoprazole (PROTONIX) 40 mg suspension Take 1 packet (40 mg total) by mouth 2 (two) times daily.    rifAXIMin (XIFAXAN) 550 mg Tab Take 1 tablet (550 mg total) by mouth 2 (two) times daily.     Family History       Problem Relation (Age of Onset)    Alcohol abuse Father, Sister, Paternal Grandfather    Bipolar disorder Father    Hypertension Mother    Suicide Father          Tobacco Use    Smoking status: Never    Smokeless tobacco: Never   Substance and Sexual Activity    Alcohol use: Not Currently     Comment: hx of ETOH abuse with cirrhosis of liver    Drug use: No    Sexual activity: Not Currently     Review of Systems   Unable to perform ROS: Acuity of condition     Objective:     Vital Signs (Most Recent):  Temp: 98.4 °F (36.9 °C) (09/16/23 1909)  Pulse: 71 (09/16/23 2302)  Resp: 14 (09/16/23 2302)  BP: (!) 116/56 (09/16/23 2302)  SpO2: 95 % (09/16/23 2302) Vital Signs (24h Range):  Temp:  [98.4 °F (36.9 °C)] 98.4 °F (36.9 °C)  Pulse:  [71-91] 71  Resp:  [14-20] 14  SpO2:  [95 %-97 %] 95 %  BP: (116-148)/(56-71) 116/56     Weight: 57.4 kg (126 lb 8.7 oz)  Body mass  "index is 23.15 kg/m².     Physical Exam  Vitals and nursing note reviewed.   Constitutional:       General: She is not in acute distress.     Appearance: She is ill-appearing.   Eyes:      General: No scleral icterus.  Cardiovascular:      Rate and Rhythm: Normal rate and regular rhythm.      Heart sounds: No murmur heard.  Pulmonary:      Effort: Pulmonary effort is normal. No respiratory distress.      Breath sounds: No wheezing.      Comments: Limited anterior exam  Musculoskeletal:      Right lower le+ Pitting Edema present.      Left lower le+ Pitting Edema present.      Comments: Pitting edema into dependent bilateral thighs   Skin:     General: Skin is warm and dry.      Coloration: Skin is pale.   Neurological:      Mental Status: She is lethargic.   Psychiatric:         Cognition and Memory: Cognition is impaired. Memory is impaired.                Significant Labs: All pertinent labs within the past 24 hours have been reviewed.  CBC:   Recent Labs   Lab 09/15/23  0308   WBC 6.60  6.60   HGB 8.5*  8.5*   HCT 26.0*  26.0*   PLT 80*  80*     CMP:   Recent Labs   Lab 09/15/23  0308      K 3.1*   *   CO2 14*   *   BUN 8   CREATININE 0.6   CALCIUM 7.6*   PROT 5.6*   ALBUMIN 1.8*   BILITOT 2.8*   ALKPHOS 187*   AST 41*   ALT 16   ANIONGAP 11     Coagulation:   Recent Labs   Lab 09/15/23  0308   INR 1.8*     Lactic Acid: No results for input(s): "LACTATE" in the last 48 hours.    Significant Imaging: I have reviewed all pertinent imaging results/findings within the past 24 hours.    XR CHEST AP PORTABLE     CLINICAL HISTORY:  Sepsis;     TECHNIQUE:  Single frontal view of the chest was performed.     COMPARISON:  None     FINDINGS:  The cardiomediastinal silhouette is prominent, similar to the previous exam noting magnification by technique.  There is elevation of the right hemidiaphragm..  There is no pleural effusion.  The trachea is midline.  The lungs are symmetrically expanded " bilaterally with coarse interstitial attenuation bilaterally..  No large focal consolidation seen.  There is no pneumothorax.  The osseous structures are remarkable for degenerative change noting osteopenia..     Impression:     1. Interstitial findings may reflect edema, no large focal consolidation.        Electronically signed by: Willis Loomis MD  Date:                                            09/16/2023  Time:                                           19:33           Exam Ended: 09/16/23 19:30

## 2023-09-17 NOTE — PLAN OF CARE
SW attempted to meet with patient at bedside to complete assessment without success.  SW attempted to contact patient's emergency contacts without success.  CM will follow up.       09/17/23 4272   Discharge Assessment   Assessment Type Discharge Planning Assessment

## 2023-09-17 NOTE — ED NOTES
Resumed care of patient and took report from nurse. Patient is in the ED due to having an abnormal result, patient was recently Covid-19 positive and pneumonia. Patient has not been cooperative which has delayed patient care. Medication was given to patient so that it can help her calm down so that MD can try for a PIV via ultrasound.

## 2023-09-17 NOTE — ED NOTES
RN was able to get blood sample but was unsuccessful at gaining PIV access via US IV. Pt states she does not want to be stuck again and is refusing further attempts at this time. MD notified.

## 2023-09-17 NOTE — CARE UPDATE
Patient drowsy but arousable on command. Will obtain ammonia level. Added lasix 40mg PO and spironolactone 100mg PO for volume overload. Blood cx 9/12 growing GPR in 1/4 bottles susp pending. Repeat blood cx obtained on 9/16. Consulted ID. Repleted hypokalemia with KCL. Rest of POC per HnP from 9/17.

## 2023-09-17 NOTE — ASSESSMENT & PLAN NOTE
Whe patient is awake and alert - restart Lactulose TID     She has significant peripheral edema.  - Home medications have Furosemide 20mg    -Recommend starting patient on Furosemide 40mg and Spironolactone 100mg or   Furosemide 20m/Spironolactone 50mg as oral diuretisc for management of peripheral edema/volume overload in cirrhosis.   She can't take PO safely now - start when she can.

## 2023-09-17 NOTE — NURSING
Nurses Note -- 4 Eyes      9/17/2023   11:47 AM      Skin assessed during: Admit      [] No Altered Skin Integrity Present    []Prevention Measures Documented      [x] Yes- Altered Skin Integrity Present or Discovered   [x] LDA Added if Not in Epic (Describe Wound)   [] New Altered Skin Integrity was Present on Admit and Documented in LDA   [] Wound Image Taken    Wound Care Consulted? Yes    Attending Nurse:  Karin Rangel RN/Staff Member:   sondra

## 2023-09-17 NOTE — PROVIDER PROGRESS NOTES - EMERGENCY DEPT.
Encounter Date: 9/16/2023    ED Physician Progress Notes        Physician Note:   I received this patient in sign out from the previous team.  I have discussed the patient's history and presentation in the ED with Dr. Ames  The patient is a 58 y.o. female who presented to the ED with Abnormal Lab (From [facility]. Pt treated for pneumonia and covid yesterday from [facility] [place] [facility]. Per Nursing home staff, pt's GP called saying she had an abnormal lab and to come to ED for retesting. )    Significant history and PE findings:  Patient from nursing [place] treated for pneumonia with Gram-positive rods in blood cultures  Significant diagnostic results:  Interstitial findings on chest x-ray without focal consolidation  Pending studies and/or consultations:  Labs and admission  Disposition:  Will continue to monitor, update patient on results of testing and determine appropriate additional treatment for the duration of stay in ED.  Pertinent lab and imaging findings are below.  Usama Harkins DO 11:49 PM 9/16/2023    11:51 PM  Difficult IV access.  Placed by resident.  Awaiting admission.    12:11 AM  Admitted to   Broad spectrum antibiotics ordered previously by other team

## 2023-09-17 NOTE — H&P
[facility] - Emergency Dept  [facility] Medicine  History & Physical    Patient Name: Marely Hamilton  MRN: [id number]  Patient Class: IP- Inpatient  Admission Date: 9/16/2023  Attending Physician: Pancho Harris MD [facility]  Admitting Physician: Fernando Anderson MD  Primary Care Provider: Viktor Ross MD         Patient information was obtained from patient, past medical records and ER records.     Subjective:     Principal Problem:Positive blood culture    Chief Complaint:   Chief Complaint   Patient presents with    Abnormal Lab     From [facility]. Pt treated for pneumonia and covid yesterday from [facility]. Per Nursing home staff, pt's GP called saying she had an abnormal lab and to come to ED for retesting.         HPI:     Per ED note  58-year-old female, with history of alcoholic cirrhosis, hepatic encephalopathy, recurrent GI bleed, chronic DVT status post IVC filter, sent to the ED from nursing home for positive blood culture.  Patient was recently discharged from the hospital on August 15, for COVID-19 pneumonia.  Her blood culture returned positive today with Gram-positive, shortness in back to the hospital for further management.  Patient complain of diarrhea no vomiting, abdominal pain, shortness of breath or fever. Patient has an indwelling catheter, but she doesn't why she needed or when was the last time it was changed.    Patient cannot provide history she is sleeping and difficult to wake secondary to receipt of chemical restraint in ED, she required chemical restraint - benadryl, haldol, ativan IM injections in order for nursing staff to place IV    She has received 1 dose of Zosyn.  Vancomycin ordered-pending administration    1 of 4 blood cultures from 9/14 - Right arm grew Gram Positive Leonel      Past Medical History:   Diagnosis Date    Addiction to drug     Alcohol abuse     Alcohol abuse, in remission 6/15/2015    14.5 weeks ago; AA weekly    Anemia     Anxiety 6/15/2015    Behavioral  problem     Bipolar disorder     Bipolar disorder in remission 6/15/2015    Cirrhosis, Laennec's 6/15/2015    Depression     Encounter for blood transfusion     Epistaxis 6/15/2015    Fatigue     Glaucoma     Hematuria     Hepatic encephalopathy 6/15/2015    Hepatic enlargement     History of psychiatric care     History of psychiatric hospitalization     History of seizure 6/15/2015    1    hx of intentional Tylenol overdose 6/15/2015    2005- situational and hx of bipolar    Hx of psychiatric care     Macrocytic anemia 9/18/2015    6 units PRBC since June 2015    Jeana     Osteoarthritis     Other ascites 6/15/2015    Psychiatric exam requested by authority     Psychiatric problem     Psychosis 9/26/2019    Renal disorder     Seizures     Self-harming behavior     Suicide attempt     Therapy     Thrombocytopenia 6/15/2015       Past Surgical History:   Procedure Laterality Date    COSMETIC SURGERY      EMBOLIZATION N/A 6/11/2023    Procedure: EMBOLIZATION, BLOOD VESSEL;  Surgeon: Debbie Surgeon;  Location: [facility];  Service: Anesthesiology;  Laterality: N/A;    ESOPHAGOGASTRODUODENOSCOPY      ESOPHAGOGASTRODUODENOSCOPY N/A 7/6/2023    Procedure: EGD (ESOPHAGOGASTRODUODENOSCOPY);  Surgeon: Bernice Guillen MD;  Location: [address]);  Service: Endoscopy;  Laterality: N/A;    ESOPHAGOGASTRODUODENOSCOPY N/A 7/11/2023    Procedure: EGD (ESOPHAGOGASTRODUODENOSCOPY);  Surgeon: Rangel Broussard MD;  Location: [address]);  Service: Endoscopy;  Laterality: N/A;       Review of patient's allergies indicates:   Allergen Reactions    Sulfa (sulfonamide antibiotics) Rash    Codeine Nausea And Vomiting       No current facility-administered medications on file prior to encounter.     Current Outpatient Medications on File Prior to Encounter   Medication Sig    ALPRAZolam (XANAX) 0.25 MG tablet Take 1 tablet (0.25 mg total) by mouth 2 (two) times daily as needed for Anxiety.     cefpodoxime (VANTIN) 100 MG tablet Take 2 tablets (200 mg total) by mouth every 12 (twelve) hours. Last dose: 9/16    ferrous sulfate (FEOSOL) 325 mg (65 mg iron) Tab tablet Take 325 mg by mouth once daily.    furosemide (LASIX) 20 MG tablet Take 20 mg by mouth once daily.    lactulose (CHRONULAC) 20 gram/30 mL Soln Take 45 mLs (30 g total) by mouth 3 (three) times daily. TITRATE TO 3-4 BOWEL MOVEMENTS DAILY.    levetiracetam 500 mg/5 mL (5 mL) Soln Take 10 mLs (1,000 mg total) by mouth 2 (two) times daily.    lithium (LITHOBID) 300 MG CR tablet Take 1 tablet (300 mg total) by mouth every evening.    OLANZapine (ZYPREXA) 5 MG tablet Take 1 tablet (5 mg total) by mouth every evening.    pantoprazole (PROTONIX) 40 mg suspension Take 1 packet (40 mg total) by mouth 2 (two) times daily.    rifAXIMin (XIFAXAN) 550 mg Tab Take 1 tablet (550 mg total) by mouth 2 (two) times daily.     Family History       Problem Relation (Age of Onset)    Alcohol abuse Father, Sister, Paternal Grandfather    Bipolar disorder Father    Hypertension Mother    Suicide Father          Tobacco Use    Smoking status: Never    Smokeless tobacco: Never   Substance and Sexual Activity    Alcohol use: Not Currently     Comment: hx of ETOH abuse with cirrhosis of liver    Drug use: No    Sexual activity: Not Currently     Review of Systems   Unable to perform ROS: Acuity of condition     Objective:     Vital Signs (Most Recent):  Temp: 98.4 °F (36.9 °C) (09/16/23 1909)  Pulse: 71 (09/16/23 2302)  Resp: 14 (09/16/23 2302)  BP: (!) 116/56 (09/16/23 2302)  SpO2: 95 % (09/16/23 2302) Vital Signs (24h Range):  Temp:  [98.4 °F (36.9 °C)] 98.4 °F (36.9 °C)  Pulse:  [71-91] 71  Resp:  [14-20] 14  SpO2:  [95 %-97 %] 95 %  BP: (116-148)/(56-71) 116/56     Weight: 57.4 kg (126 lb 8.7 oz)  Body mass index is 23.15 kg/m².     Physical Exam  Vitals and nursing note reviewed.   Constitutional:       General: She is not in acute distress.      "Appearance: She is ill-appearing.   Eyes:      General: No scleral icterus.  Cardiovascular:      Rate and Rhythm: Normal rate and regular rhythm.      Heart sounds: No murmur heard.  Pulmonary:      Effort: Pulmonary effort is normal. No respiratory distress.      Breath sounds: No wheezing.      Comments: Limited anterior exam  Musculoskeletal:      Right lower le+ Pitting Edema present.      Left lower le+ Pitting Edema present.      Comments: Pitting edema into dependent bilateral thighs   Skin:     General: Skin is warm and dry.      Coloration: Skin is pale.   Neurological:      Mental Status: She is lethargic.   Psychiatric:         Cognition and Memory: Cognition is impaired. Memory is impaired.                Significant Labs: All pertinent labs within the past 24 hours have been reviewed.  CBC:   Recent Labs   Lab 09/15/23  030   WBC 6.60  6.60   HGB 8.5*  8.5*   HCT 26.0*  26.0*   PLT 80*  80*     CMP:   Recent Labs   Lab 09/15/23  030      K 3.1*   *   CO2 14*   *   BUN 8   CREATININE 0.6   CALCIUM 7.6*   PROT 5.6*   ALBUMIN 1.8*   BILITOT 2.8*   ALKPHOS 187*   AST 41*   ALT 16   ANIONGAP 11     Coagulation:   Recent Labs   Lab 09/15/23  0308   INR 1.8*     Lactic Acid: No results for input(s): "LACTATE" in the last 48 hours.    Significant Imaging: I have reviewed all pertinent imaging results/findings within the past 24 hours.    XR CHEST AP PORTABLE     CLINICAL HISTORY:  Sepsis;     TECHNIQUE:  Single frontal view of the chest was performed.     COMPARISON:  None     FINDINGS:  The cardiomediastinal silhouette is prominent, similar to the previous exam noting magnification by technique.  There is elevation of the right hemidiaphragm..  There is no pleural effusion.  The trachea is midline.  The lungs are symmetrically expanded bilaterally with coarse interstitial attenuation bilaterally..  No large focal consolidation seen.  There is no pneumothorax.  The osseous " structures are remarkable for degenerative change noting osteopenia..     Impression:     1. Interstitial findings may reflect edema, no large focal consolidation.        Electronically signed by: Willis Loomis MD  Date:                                            09/16/2023  Time:                                           19:33           Exam Ended: 09/16/23 19:30           Assessment/Plan:     * Positive blood culture  F/u repeat blood cultures   -start on vancomycin   -Hold any further zosyn     No speciation noted on initial culture, given 1 of 4 may be contaminant      Pneumonia due to COVID-19 virus  -Patient recently admitted and had received remdesivir and dexamethasone while inpatient.   -currently patient is on room air w/o O2 requirement.    -Repeat CXR does not show new or worsening airspace disease      Infection Control notification  and isolation- respiratory, contact and droplet per protocol    Diagnostics: CBC, CMP, CRP, Blood Culture x2 and Portable CXR    Management: Inhaled bronchodilators as needed for shortness of breath.    Advance Care Planning  Current advance care plan has not been discussed with patient/family/POA and patient currently wishes Full Code.  (as per prior admission, patient can't participate tonight)     Acute cystitis  She was diagnosed with cystitis at last admit on 9/12.   Her urine culture had Multi-drug resistant E.Coli,  Patient received 4 doses of ceftriaxone.   -E.coli was RESISTANT to Ceftriaxone     -Based on Culture/Sensitivity - Give 3 days of Ciprofloxacin.       Chronic liver failure  Whe patient is awake and alert - restart Lactulose TID     She has significant peripheral edema.  - Home medications have Furosemide 20mg    -Recommend starting patient on Furosemide 40mg and Spironolactone 100mg or   Furosemide 20m/Spironolactone 50mg as oral diuretisc for management of peripheral edema/volume overload in cirrhosis.   She can't take PO safely now - start when she  can.      Hepatic encephalopathy  Resume home Oral lactulose when patient safe to take oral medications.       History of seizure  Restart home keppra - will order as IV for now - switch to Po when appropriate.       Bipolar 1 disorder, depressed, severe  Resume lithium when appropriate.       Deep vein thrombosis (DVT) of femoral vein of right lower extremity  S/p IVC filter placement for hx of bilateral VTE  -hx of recurrent GI bleeding and thrombocytopenia - patient is not anticoagulation candidate.         VTE Risk Mitigation (From admission, onward)    None                     Fernando Anderson MD  Department of Hospital Medicine  [facility] - Emergency Dept

## 2023-09-17 NOTE — ASSESSMENT & PLAN NOTE
-Patient recently admitted and had received remdesivir and dexamethasone while inpatient.   -currently patient is on room air w/o O2 requirement.    -Repeat CXR does not show new or worsening airspace disease      Infection Control notification  and isolation- respiratory, contact and droplet per protocol    Diagnostics: CBC, CMP, CRP, Blood Culture x2 and Portable CXR    Management: Inhaled bronchodilators as needed for shortness of breath.    Advance Care Planning  Current advance care plan has not been discussed with patient/family/POA and patient currently wishes Full Code.   (as per prior admission, patient can't participate tonight)

## 2023-09-17 NOTE — HPI
Per ED note  58-year-old female, with history of alcoholic cirrhosis, hepatic encephalopathy, recurrent GI bleed, chronic DVT status post IVC filter, sent to the ED from nursing home for positive blood culture.  Patient was recently discharged from the hospital on August 15, for COVID-19 pneumonia.  Her blood culture returned positive today with Gram-positive, shortness in back to the hospital for further management.  Patient complain of diarrhea no vomiting, abdominal pain, shortness of breath or fever. Patient has an indwelling catheter, but she doesn't why she needed or when was the last time it was changed.    Patient cannot provide history she is sleeping and difficult to wake secondary to receipt of chemical restraint in ED, she required chemical restraint - benadryl, haldol, ativan IM injections in order for nursing staff to place IV    She has received 1 dose of Zosyn.  Vancomycin ordered-pending administration    1 of 4 blood cultures from 9/14 - Right arm grew Gram Positive Leonel

## 2023-09-17 NOTE — ED NOTES
Assumed care of pt. Pt resting in semifowlers position sleeping. Easily awakened to verbal stimuli. Denies needs    Patient identifiers verified and correct for salvador joe  LOC: The patient is oriented x3 after awakening Appropriate affect; answers questions appropriately  APPEARANCE: Patient appears comfortable and in no acute distress, patient is clean and well groomed.  SKIN: The skin is warm and dry, color consistent with ethnicity, patient has normal skin turgor and moist mucus membranes, skin intact, pt denies breakdown or bruising  MUSCULOSKELETAL: Patient moving all extremities spontaneously, no edema noted. Ambulates with steady gait  RESPIRATORY: Airway is open and patent, respirations are spontaneous, patient has a normal effort and rate, no accessory muscle use noted, pt remains on continuous pulse ox with O2 sats noted at 97% on room air  CARDIAC: pt remains on cardiac monitor. Denies chest pain. NSR noted on monitor  GASTRO: soft and non-tender to palpation. Denies n/v/d/abd pain  : baugh catheter in place  NEURO: Pt opens eyes spontaneously, behavior appropriate to situation, follows commands, facial expression symmetrical, bilateral hand grasp equal and even, purposeful motor response noted, very slow speaking/ quiet

## 2023-09-17 NOTE — ED PROVIDER NOTES
Encounter Date: 9/16/2023       History     Chief Complaint   Patient presents with    Abnormal Lab     From [facility]. Pt treated for pneumonia and covid yesterday [place] [facility] Per Nursing home staff, pt's GP called saying she had an abnormal lab and to come to ED for retesting.      HPI  58-year-old female, with history of alcoholic cirrhosis, hepatic encephalopathy, recurrent GI bleed, chronic DVT status post IVC filter, sent to the ED from nursing home for positive blood culture.  Patient was recently discharged from the hospital on August 15, for COVID-19 pneumonia.  Her blood culture returned positive today with Gram-positive, shortness in back to the hospital for further management.  Patient complain of diarrhea no vomiting, abdominal pain, shortness of breath or fever. Patient has an indwelling catheter, but she doesn't why she needed or when was the last time it was changed.  Review of patient's allergies indicates:   Allergen Reactions    Sulfa (sulfonamide antibiotics) Rash    Codeine Nausea And Vomiting     Past Medical History:   Diagnosis Date    Addiction to drug     Alcohol abuse     Alcohol abuse, in remission 6/15/2015    14.5 weeks ago; AA weekly    Anemia     Anxiety 6/15/2015    Behavioral problem     Bipolar disorder     Bipolar disorder in remission 6/15/2015    Cirrhosis, Laennec's 6/15/2015    Depression     Encounter for blood transfusion     Epistaxis 6/15/2015    Fatigue     Glaucoma     Hematuria     Hepatic encephalopathy 6/15/2015    Hepatic enlargement     History of psychiatric care     History of psychiatric hospitalization     History of seizure 6/15/2015    1    hx of intentional Tylenol overdose 6/15/2015    2005- situational and hx of bipolar    Hx of psychiatric care     Macrocytic anemia 9/18/2015    6 units PRBC since June 2015    Jeana     Osteoarthritis     Other ascites 6/15/2015    Psychiatric exam requested by authority     Psychiatric problem     Psychosis 9/26/2019     Renal disorder     Seizures     Self-harming behavior     Suicide attempt     Therapy     Thrombocytopenia 6/15/2015     Past Surgical History:   Procedure Laterality Date    COSMETIC SURGERY      EMBOLIZATION N/A 6/11/2023    Procedure: EMBOLIZATION, BLOOD VESSEL;  Surgeon: Debbie Surgeon;  Location: [facility];  Service: Anesthesiology;  Laterality: N/A;    ESOPHAGOGASTRODUODENOSCOPY      ESOPHAGOGASTRODUODENOSCOPY N/A 7/6/2023    Procedure: EGD (ESOPHAGOGASTRODUODENOSCOPY);  Surgeon: Bernice Guillen MD;  Location: [facility] ([address] [place] [address]);  Service: Endoscopy;  Laterality: N/A;    ESOPHAGOGASTRODUODENOSCOPY N/A 7/11/2023    Procedure: EGD (ESOPHAGOGASTRODUODENOSCOPY);  Surgeon: aRngel Broussard MD;  [facility]  Service: Endoscopy;  Laterality: N/A;     Family History   Problem Relation Age of Onset    Alcohol abuse Father     Suicide Father     Bipolar disorder Father     Alcohol abuse Sister     Hypertension Mother     Alcohol abuse Paternal Grandfather     Drug abuse Neg Hx     Dementia Neg Hx      Social History     Tobacco Use    Smoking status: Never    Smokeless tobacco: Never   Substance Use Topics    Alcohol use: Not Currently     Comment: hx of ETOH abuse with cirrhosis of liver    Drug use: No     Review of Systems   Unable to perform ROS: Mental status change       Physical Exam     Initial Vitals [09/16/23 1909]   BP Pulse Resp Temp SpO2   (!) 148/71 91 18 98.4 °F (36.9 °C) 95 %      MAP       --         Physical Exam    Nursing note and vitals reviewed.  Constitutional: She appears well-developed. No distress.   HENT:   Head: Normocephalic and atraumatic.   Mouth/Throat: Oropharynx is clear and moist.   Eyes: Conjunctivae and EOM are normal.   Neck: No JVD present.   Normal range of motion.  Cardiovascular:  Normal rate, regular rhythm, normal heart sounds and intact distal pulses.           Pulmonary/Chest: Breath sounds normal. No respiratory distress.   Abdominal: Abdomen is soft.  She exhibits no distension. There is no abdominal tenderness.   Musculoskeletal:         General: No tenderness or edema. Normal range of motion.      Cervical back: Normal range of motion.     Neurological: She is alert and oriented to person, place, and time. She has normal strength. No cranial nerve deficit.   Intermittent confusion, uncooperative   Skin: Skin is warm and dry. Capillary refill takes less than 2 seconds.   Psychiatric: Her affect is angry.         ED Course   Procedures  Labs Reviewed   URINALYSIS, REFLEX TO URINE CULTURE - Abnormal; Notable for the following components:       Result Value    Leukocytes, UA 3+ (*)     All other components within normal limits    Narrative:     Specimen Source->Urine   CBC WITHOUT DIFFERENTIAL - Abnormal; Notable for the following components:    WBC 3.29 (*)     RBC 2.02 (*)     Hemoglobin 7.2 (*)     Hematocrit 23.2 (*)      (*)     MCH 35.6 (*)     MCHC 31.0 (*)     RDW 15.8 (*)     Platelets 52 (*)     All other components within normal limits   COMPREHENSIVE METABOLIC PANEL - Abnormal; Notable for the following components:    Potassium 3.0 (*)     Chloride 114 (*)     CO2 19 (*)     Glucose 119 (*)     Calcium 7.3 (*)     Total Protein 4.2 (*)     Albumin 1.4 (*)     Total Bilirubin 2.4 (*)     Anion Gap 6 (*)     All other components within normal limits   PHOSPHORUS - Abnormal; Notable for the following components:    Phosphorus 2.2 (*)     All other components within normal limits   PROTIME-INR - Abnormal; Notable for the following components:    Prothrombin Time 20.3 (*)     INR 2.0 (*)     All other components within normal limits   URINALYSIS MICROSCOPIC - Abnormal; Notable for the following components:    RBC, UA 76 (*)     WBC, UA 18 (*)     Bacteria Many (*)     Yeast, UA Many (*)     All other components within normal limits    Narrative:     Specimen Source->Urine   CULTURE, BLOOD    Narrative:     Aerobic and anaerobic   CULTURE, BLOOD     Narrative:     Aerobic and anaerobic   CULTURE, URINE   MAGNESIUM   LACTIC ACID, PLASMA   C-REACTIVE PROTEIN   CBC W/ AUTO DIFFERENTIAL   COMPREHENSIVE METABOLIC PANEL   AMMONIA   COMPREHENSIVE METABOLIC PANEL   ISTAT LACTATE     EKG Readings: (Independently Interpreted)   Initial Reading: No STEMI. Previous EKG: Compared with most recent EKG Rhythm: Normal Sinus Rhythm. Heart Rate: 77. Ectopy: No Ectopy. Conduction: Normal. ST Segments: Normal ST Segments. T Waves: Normal. Axis: Normal. Clinical Impression: Normal Sinus Rhythm     ECG Results              EKG 12-lead (Final result)  Result time 09/17/23 14:50:46      Final result by Interface, Lab In [facility] (09/17/23 14:50:46)                   Narrative:    Test Reason : R78.81,    Vent. Rate : 077 BPM     Atrial Rate : 077 BPM     P-R Int : 138 ms          QRS Dur : 082 ms      QT Int : 412 ms       P-R-T Axes : 044 064 080 degrees     QTc Int : 466 ms    Normal sinus rhythm  Nonspecific T wave abnormality  Prolonged QT  Abnormal ECG  When compared with ECG of 12-SEP-2023 16:19,  No significant change was found  Confirmed by Abimael CLEMENTS MD ([id number]) on 9/17/2023 2:50:39 PM    Referred By: [email or web address]   SELF           Confirmed By:Abimael CLEMENTS MD                                  Imaging Results              X-Ray Chest AP Portable (Final result)  Result time 09/16/23 19:33:04      Final result by Willis Loomis MD (09/16/23 19:33:04)                   Impression:      1. Interstitial findings may reflect edema, no large focal consolidation.      Electronically signed by: Willis Loomis MD  Date:    09/16/2023  Time:    19:33               Narrative:    EXAMINATION:  XR CHEST AP PORTABLE    CLINICAL HISTORY:  Sepsis;    TECHNIQUE:  Single frontal view of the chest was performed.    COMPARISON:  None    FINDINGS:  The cardiomediastinal silhouette is prominent, similar to the previous exam noting magnification by technique.  There is elevation of the right  hemidiaphragm..  There is no pleural effusion.  The trachea is midline.  The lungs are symmetrically expanded bilaterally with coarse interstitial attenuation bilaterally..  No large focal consolidation seen.  There is no pneumothorax.  The osseous structures are remarkable for degenerative change noting osteopenia..                                       Medications   sodium chloride 0.9% flush 10 mL (has no administration in time range)   melatonin tablet 6 mg (has no administration in time range)   vancomycin 750 mg in dextrose 5 % (D5W) 250 mL IVPB (Vial-Mate) (has no administration in time range)   ciprofloxacin (CIPRO)400mg/200ml D5W IVPB 400 mg ( Intravenous Canceled Entry 9/17/23 1530)   albuterol inhaler 2 puff (2 puffs Inhalation Given 9/17/23 1342)   ascorbic acid (vitamin C) tablet 500 mg (500 mg Oral Given 9/17/23 0836)   lactulose 20 gram/30 mL solution Soln 30 g (30 g Oral Given 9/17/23 1500)   levETIRAcetam injection 1,000 mg (1,000 mg Intravenous Given 9/17/23 0840)   lithium CR tablet 300 mg (300 mg Oral Given 9/17/23 0632)   miconazole nitrate 2% ointment ( Topical (Top) Given 9/17/23 1000)   multivitamin tablet (1 tablet Oral Given 9/17/23 0836)   pantoprazole suspension 40 mg (40 mg Oral Given 9/17/23 0835)   rifAXIMin tablet 550 mg (550 mg Oral Given 9/17/23 0835)   acetaminophen tablet 500 mg (has no administration in time range)   dextromethorphan-guaiFENesin  mg/5 ml liquid 10 mL (has no administration in time range)   naloxone 0.4 mg/mL injection 0.02 mg (has no administration in time range)   OLANZapine injection 5 mg (has no administration in time range)   prochlorperazine injection Soln 5 mg (has no administration in time range)   ondansetron injection 4 mg (has no administration in time range)   furosemide tablet 40 mg (40 mg Oral Given 9/17/23 1010)   spironolactone tablet 100 mg (100 mg Oral Given 9/17/23 1000)   piperacillin-tazobactam (ZOSYN) 4.5 g in dextrose 5 % in water  "(D5W) 100 mL IVPB (MB+) (0 g Intravenous Stopped 9/17/23 0232)   sodium chloride 0.9% bolus 1,000 mL 1,000 mL (0 mLs Intravenous Stopped 9/17/23 0233)   LORazepam injection 2 mg (2 mg Intramuscular Given 9/16/23 2249)   diphenhydrAMINE injection 50 mg (50 mg Intramuscular Given 9/16/23 2255)   haloperidol lactate injection 5 mg (5 mg Intramuscular Given 9/16/23 2255)   vancomycin 1,250 mg in dextrose 5 % (D5W) 250 mL IVPB (Vial-Mate) (0 mg Intravenous Stopped 9/17/23 0455)   potassium chloride SA CR tablet 40 mEq (40 mEq Oral Given 9/17/23 1345)     Medical Decision Making  58-year-old female, with history of alcoholic cirrhosis, hepatic encephalopathy, recurrent GI bleed, chronic DVT status post IVC filter, sent to the ED from nursing home for positive blood culture.  Patient appear to have intermittent confusion, no focal neurological deficit, hemodynamically stable afebrile.    Differential including but not limited to bacteremia, pneumonia, UTI, hepatic encephalopathy, electrolyte derangement    Patient became aggressive chill nursing staff, unable to obtain IV, however, patient is unable to explain why she does not need the IV, only scream out "no."Patient does not appear to have capacity to make her own decision.  She is given chemical restraint.  IV and labs obtained after.  Will start fluid resuscitation, and broad-spectrum antibiotics.  Will admit patient to Hospital Medicine for further management of her bacteremia.    Amount and/or Complexity of Data Reviewed  External Data Reviewed: labs and notes.  Labs: ordered. Decision-making details documented in ED Course.  Radiology: ordered and independent interpretation performed. Decision-making details documented in ED Course.  ECG/medicine tests: ordered and independent interpretation performed. Decision-making details documented in ED Course.    Risk  OTC drugs.  Prescription drug management.  Decision regarding hospitalization.              Attending " Attestation:   Physician Attestation Statement for Resident:  As the supervising MD   Physician Attestation Statement: I have personally seen and examined this patient.   I agree with the above history.  -:   As the supervising MD I agree with the above PE.     As the supervising MD I agree with the above treatment, course, plan, and disposition.                    ED Course as of 09/17/23 1604   Sat Sep 16, 2023   2010 X-Ray Chest AP Portable  Increased interstitial markings per my independent interpretation.     [DC]   Sun Sep 17, 2023   0015 Sign-out to Dr. Harkins, will follow-up with labs, anticipate admit to Hospital Medicine for further management of her bacteremia. [NC]      ED Course User Index  [DC] Driss Ames MD  [NC] Kevin Rollins MD          Critical Care   Date: 09/17/2023  Performed by: Driss Ames MD   Authorized by: Driss Ames MD    Total critical care time (exclusive of procedural time) : 45 minutes  Critical care was necessary to treat or prevent imminent or life-threatening deterioration of the following conditions:  AMS requiring chemical restraint            Clinical Impression:   Final diagnoses:  [R78.81] Bacteremia        ED Disposition Condition    Admit Stable                Kevin Rollins MD  Resident  09/17/23 0015       Driss Ames MD  09/17/23 1605

## 2023-09-17 NOTE — ASSESSMENT & PLAN NOTE
F/u repeat blood cultures   -start on vancomycin   -Hold any further zosyn     No speciation noted on initial culture, given 1 of 4 may be contaminant

## 2023-09-17 NOTE — HPI
"58F with h/o ETOH Cirrhosis c/b GIB, hepatic encephalopathy, Chronic LE DVT s/p IVC filter, admitted 9/12 with SOB and readmitted 9/16 because the bcx from 9/12 were positive for GPR. Pt awake and alert and denies current complaints other than wanting baugh catheter out because it's "itching". Denies dysuria. Last admit 9/12, her NH had been concerned she was more sob. Denies productive cough. covid testing positive. Afebrile this and last admit. Reports one loose stool (on rifaxamin), but denies recent changes. Denies abdominal pain. Denies indwelling hardware.       Us- Limited ultrasound of the abdomen and pelvis demonstrated no significant fluid for diagnostic or therapeutic paracentesis.       Bcx 1 of 2 with lactobacillus, repeat clear    Blood culture #1 **CANNOT BE ORDERED STAT** [[id number]] (Abnormal) Collected: 09/12/23 9856   Order Status: Completed Specimen: Blood from Peripheral, Forearm, Right Updated: 09/17/23 0929    Blood Culture, Routine Gram stain aer bottle:  Gram positive rods     Results called to and read back by: Sindy Duran MD 09/16/2023  14:26     LACTOBACILLUS SPECIES   Further identified as Lactobacillus rhamnosus      Component Ref Range & Units 00:07  (9/17/23) 5 d ago  (9/12/23) 2 wk ago  (8/31/23) 1 mo ago  (8/14/23) 1 mo ago  (7/28/23) 2 mo ago  (7/17/23) 4 mo ago  (5/19/23)   RBC, UA 0 - 4 /hpf 76 High   24 High   99 High   9 High   2  7 High   6 High     WBC, UA 0 - 5 /hpf 18 High   95 High   >100 High   87 High   >100 High   73 High   >100 High     Bacteria None-Occ /hpf Many Abnormal   Few Abnormal   Many Abnormal   Many Abnormal   Occasional  Occasional  Rare    Yeast, UA None Many Abnormal               Component 5 d ago   Urine Culture, Routine  Abnormal   ESCHERICHIA COLI   >100,000 cfu/ml   No other significant isolate     Resulting Agency [facility]        Susceptibility     Escherichia coli     CULTURE, URINE     Amox/K Clav'ate >16/8 mcg/mL Resistant     Amp/Sulbactam >16/8 " "mcg/mL Resistant     Ampicillin >16 mcg/mL Resistant     Cefazolin >16 mcg/mL Resistant     Cefepime <=2 mcg/mL Sensitive     Ceftriaxone >32 mcg/mL Resistant     Ciprofloxacin <=1 mcg/mL Sensitive     Ertapenem <=0.5 mcg/mL Sensitive     Gentamicin <=4 mcg/mL Sensitive     Levofloxacin <=2 mcg/mL Sensitive     Meropenem <=1 mcg/mL Sensitive     Nitrofurantoin <=32 mcg/mL Sensitive     Piperacillin/Tazo >64 mcg/mL Resistant     Tobramycin <=4 mcg/mL Sensitive     Trimeth/Sulfa <=2/38 mcg/mL Sensitive               Component Ref Range & Units 00:07  (9/17/23) 5 d ago  (9/12/23)        RBC, UA 0 - 4 /hpf 76 High   24 High          WBC, UA 0 - 5 /hpf 18 High   95 High          Bacteria None-Occ /hpf Many Abnormal   Few Abnormal          Yeast, UA None Many Abnormal                   Id consulted for "Positive blood cultures  "

## 2023-09-17 NOTE — ASSESSMENT & PLAN NOTE
She was diagnosed with cystitis at last admit on 9/12.   Her urine culture had Multi-drug resistant E.Coli,  Patient received 4 doses of ceftriaxone.   -E.coli was RESISTANT to Ceftriaxone     -Based on Culture/Sensitivity - Give 3 days of Ciprofloxacin.

## 2023-09-17 NOTE — ED NOTES
Sat at 90 degree angle to take medications. Pt swallowed one pill at a time and water without difficulty. Will remain upright for at least 30 minutes

## 2023-09-17 NOTE — ASSESSMENT & PLAN NOTE
"58F with h/o ETOH cirrhosis c/b GIB, hepatic encephalopathy, chronic LE DVT s/p IVC filter, admitted 9/12 with SOB (found to have covid) and readmitted 9/16 because the bcx from 9/12 were positive for GPR, which ended up growing lactobacillus in one of two sets. Afebrile, no abdominal symptoms. Improving dry cough. No changes to loose stools (takes rifaxamin). Us- Limited ultrasound of the abdomen and pelvis demonstrated no significant fluid for diagnostic or therapeutic paracentesis. Has received 6 days of abx including ceftriaxone, azithro, zosyn, cipro. Id consulted for "Positive blood cultures    Sometimes the lactobacillus can be pathogenic, santana in cirrhotics. However, suspect it is a contaminant in this patient. No current localizing symptoms of infection, afebrile/not septic    Recommendations:   - consider stopping antibiotics. Will defer need for SBP ppx to hepatology  - supportive care for covid-19 infection  - consider baugh removal  "

## 2023-09-17 NOTE — ASSESSMENT & PLAN NOTE
S/p IVC filter placement for hx of bilateral VTE  -hx of recurrent GI bleeding and thrombocytopenia - patient is not anticoagulation candidate.

## 2023-09-17 NOTE — ED NOTES
Pt requesting a call to her sister, Zaria Trujillo, to get in touch with her . Pt's sister #: [phone]

## 2023-09-17 NOTE — ED TRIAGE NOTES
Marely Hamilton, a 58 y.o. female presents to the ED w/ complaint of abnormal lab    Coming from NH to be re-tested for abnormal lab.     Triage note:  Chief Complaint   Patient presents with    Abnormal Lab     From [facility]. Pt treated for pneumonia and covid yesterday [place] [facility] Per Nursing home staff, pt's GP called saying she had an abnormal lab and to come to ED for retesting.      Review of patient's allergies indicates:   Allergen Reactions    Sulfa (sulfonamide antibiotics) Rash    Codeine Nausea And Vomiting     Past Medical History:   Diagnosis Date    Addiction to drug     Alcohol abuse     Alcohol abuse, in remission 6/15/2015    14.5 weeks ago; AA weekly    Anemia     Anxiety 6/15/2015    Behavioral problem     Bipolar disorder     Bipolar disorder in remission 6/15/2015    Cirrhosis, 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    Depression     Encounter for blood transfusion     Epistaxis 6/15/2015    Fatigue     Glaucoma     Hematuria     Hepatic encephalopathy 6/15/2015    Hepatic enlargement     History of psychiatric care     History of psychiatric hospitalization     History of seizure 6/15/2015    1    hx of intentional Tylenol overdose 6/15/2015    2005- situational and hx of bipolar    Hx of psychiatric care     Macrocytic anemia 9/18/2015    6 units PRBC since June 2015    Jeana     Osteoarthritis     Other ascites 6/15/2015    Psychiatric exam requested by authority     Psychiatric problem     Psychosis 9/26/2019    Renal disorder     Seizures     Self-harming behavior     Suicide attempt     Therapy     Thrombocytopenia 6/15/2015

## 2023-09-17 NOTE — PROGRESS NOTES
Pharmacokinetic Initial Assessment: IV Vancomycin    Assessment/Plan:    Initiate intravenous vancomycin with loading dose of 1250 mg once followed by a maintenance dose of vancomycin 750mg IV every 12 hours  Desired empiric serum trough concentration is 15 to 20 mcg/mL  Draw vancomycin trough level 60 min prior to fourth dose on 9/18 at approximately 1400  Pharmacy will continue to follow and monitor vancomycin.      Please contact pharmacy at extension [phone] with any questions regarding this assessment.     Thank you for the consult,   Americo DIOR Jami       Patient brief summary:  Marely Hamilton is a 58 y.o. female initiated on antimicrobial therapy with IV Vancomycin for treatment of suspected bacteremia    Drug Allergies:   Review of patient's allergies indicates:   Allergen Reactions    Sulfa (sulfonamide antibiotics) Rash    Codeine Nausea And Vomiting       Actual Body Weight:   57.4kg    Renal Function:   Estimated Creatinine Clearance: 81.8 mL/min (based on SCr of 0.6 mg/dL).,     Dialysis Method (if applicable):  N/A    CBC (last 72 hours):  Recent Labs   Lab Result Units 09/14/23  0658 09/15/23  0308   WBC K/uL 3.41*  3.41* 6.60  6.60   Hemoglobin g/dL 8.1*  8.1* 8.5*  8.5*   Hematocrit % 25.0*  25.0* 26.0*  26.0*   Platelets K/uL 69*  69* 80*  80*   Gran % % 80.3*  80.3* 79.9*  79.9*   Lymph % % 14.1*  14.1* 11.1*  11.1*   Mono % % 5.0  5.0 7.6  7.6   Eosinophil % % 0.0  0.0 0.6  0.6   Basophil % % 0.0  0.0 0.0  0.0   Differential Method  Automated  Automated Automated  Automated       Metabolic Panel (last 72 hours):  Recent Labs   Lab Result Units 09/14/23  0658 09/15/23  0308   Sodium mmol/L 143 139   Potassium mmol/L 3.3* 3.1*   Chloride mmol/L 114* 114*   CO2 mmol/L 20* 14*   Glucose mg/dL 142* 147*   BUN mg/dL 8 8   Creatinine mg/dL 0.5 0.6   Albumin g/dL 1.7* 1.8*   Total Bilirubin mg/dL 3.0* 2.8*   Alkaline Phosphatase U/L 141* 187*   AST U/L 35 41*   ALT U/L 13 16  "      Drug levels (last 3 results):  No results for input(s): "VANCOMYCINRA", "VANCORANDOM", "VANCOMYCINPE", "VANCOPEAK", "VANCOMYCINTR", "VANCOTROUGH" in the last 72 hours.    Microbiologic Results:  Microbiology Results (last 7 days)       Procedure Component Value Units Date/Time    Blood culture x two cultures. Draw prior to antibiotics. [[id number]] Collected: 09/16/23 2209    Order Status: Sent Specimen: Blood from Peripheral, Hand, Right Updated: 09/16/23 2220    Blood culture x two cultures. Draw prior to antibiotics. [[id number]] Collected: 09/16/23 2208    Order Status: Sent Specimen: Blood from Peripheral, Antecubital, Right Updated: 09/16/23 2220            "

## 2023-09-17 NOTE — ED NOTES
"Pt is refusing PIV placement, she states "I do not want an IV, take that needle out immediately." MD notified. MD will speak to pt.   "

## 2023-09-18 LAB
BACTERIA BLD CULT: ABNORMAL
BACTERIA UR CULT: NORMAL
BACTERIA UR CULT: NORMAL
POCT GLUCOSE: 304 MG/DL (ref 70–110)

## 2023-09-18 PROCEDURE — 25000003 PHARM REV CODE 250: Performed by: STUDENT IN AN ORGANIZED HEALTH CARE EDUCATION/TRAINING PROGRAM

## 2023-09-18 PROCEDURE — 25000003 PHARM REV CODE 250: Performed by: HOSPITALIST

## 2023-09-18 PROCEDURE — 27100098 HC SPACER

## 2023-09-18 PROCEDURE — 99900035 HC TECH TIME PER 15 MIN (STAT)

## 2023-09-18 PROCEDURE — 94761 N-INVAS EAR/PLS OXIMETRY MLT: CPT

## 2023-09-18 PROCEDURE — 63600175 PHARM REV CODE 636 W HCPCS: Performed by: HOSPITALIST

## 2023-09-18 PROCEDURE — 94640 AIRWAY INHALATION TREATMENT: CPT

## 2023-09-18 PROCEDURE — 20600001 HC STEP DOWN PRIVATE ROOM

## 2023-09-18 PROCEDURE — 99232 SBSQ HOSP IP/OBS MODERATE 35: CPT | Mod: ,,, | Performed by: STUDENT IN AN ORGANIZED HEALTH CARE EDUCATION/TRAINING PROGRAM

## 2023-09-18 PROCEDURE — 27000207 HC ISOLATION

## 2023-09-18 PROCEDURE — 99232 PR SUBSEQUENT HOSPITAL CARE,LEVL II: ICD-10-PCS | Mod: ,,, | Performed by: STUDENT IN AN ORGANIZED HEALTH CARE EDUCATION/TRAINING PROGRAM

## 2023-09-18 RX ORDER — LANOLIN ALCOHOL/MO/W.PET/CERES
400 CREAM (GRAM) TOPICAL ONCE
Status: COMPLETED | OUTPATIENT
Start: 2023-09-18 | End: 2023-09-18

## 2023-09-18 RX ORDER — POTASSIUM CHLORIDE 20 MEQ/1
40 TABLET, EXTENDED RELEASE ORAL ONCE
Status: COMPLETED | OUTPATIENT
Start: 2023-09-18 | End: 2023-09-18

## 2023-09-18 RX ORDER — CIPROFLOXACIN 500 MG/1
500 TABLET ORAL EVERY 12 HOURS
Status: COMPLETED | OUTPATIENT
Start: 2023-09-18 | End: 2023-09-19

## 2023-09-18 RX ORDER — LEVETIRACETAM 100 MG/ML
1000 SOLUTION ORAL 2 TIMES DAILY
Status: DISCONTINUED | OUTPATIENT
Start: 2023-09-18 | End: 2023-09-21 | Stop reason: HOSPADM

## 2023-09-18 RX ADMIN — Medication 400 MG: at 09:09

## 2023-09-18 RX ADMIN — RIFAXIMIN 550 MG: 550 TABLET ORAL at 09:09

## 2023-09-18 RX ADMIN — FUROSEMIDE 40 MG: 40 TABLET ORAL at 09:09

## 2023-09-18 RX ADMIN — ALBUTEROL SULFATE 2 PUFF: 108 INHALANT RESPIRATORY (INHALATION) at 07:09

## 2023-09-18 RX ADMIN — THERA TABS 1 TABLET: TAB at 09:09

## 2023-09-18 RX ADMIN — POTASSIUM CHLORIDE 40 MEQ: 1500 TABLET, EXTENDED RELEASE ORAL at 09:09

## 2023-09-18 RX ADMIN — LEVETIRACETAM 1000 MG: 100 SOLUTION ORAL at 08:09

## 2023-09-18 RX ADMIN — PANTOPRAZOLE SODIUM 40 MG: 40 GRANULE, DELAYED RELEASE ORAL at 08:09

## 2023-09-18 RX ADMIN — MICONAZOLE NITRATE: 20 OINTMENT TOPICAL at 08:09

## 2023-09-18 RX ADMIN — ALBUTEROL SULFATE 2 PUFF: 108 INHALANT RESPIRATORY (INHALATION) at 08:09

## 2023-09-18 RX ADMIN — CIPROFLOXACIN 500 MG: 500 TABLET, FILM COATED ORAL at 08:09

## 2023-09-18 RX ADMIN — LITHIUM CARBONATE 300 MG: 300 TABLET, FILM COATED, EXTENDED RELEASE ORAL at 08:09

## 2023-09-18 RX ADMIN — ACETAMINOPHEN 500 MG: 500 TABLET ORAL at 08:09

## 2023-09-18 RX ADMIN — VANCOMYCIN HYDROCHLORIDE 750 MG: 750 INJECTION, POWDER, LYOPHILIZED, FOR SOLUTION INTRAVENOUS at 03:09

## 2023-09-18 RX ADMIN — Medication 500 MG: at 08:09

## 2023-09-18 RX ADMIN — Medication 6 MG: at 08:09

## 2023-09-18 RX ADMIN — CIPROFLOXACIN 400 MG: 2 INJECTION, SOLUTION INTRAVENOUS at 02:09

## 2023-09-18 RX ADMIN — ALBUTEROL SULFATE 2 PUFF: 108 INHALANT RESPIRATORY (INHALATION) at 01:09

## 2023-09-18 RX ADMIN — SPIRONOLACTONE 100 MG: 100 TABLET ORAL at 09:09

## 2023-09-18 RX ADMIN — LEVETIRACETAM 1000 MG: 100 INJECTION, SOLUTION INTRAVENOUS at 09:09

## 2023-09-18 RX ADMIN — PANTOPRAZOLE SODIUM 40 MG: 40 GRANULE, DELAYED RELEASE ORAL at 09:09

## 2023-09-18 RX ADMIN — RIFAXIMIN 550 MG: 550 TABLET ORAL at 08:09

## 2023-09-18 RX ADMIN — Medication 500 MG: at 09:09

## 2023-09-18 NOTE — CONSULTS
[facility] - Telemetry Stepdown  Wound Care    Patient Name:  Marely Hamilton   MRN:  [id number]  Date: 9/18/2023  Diagnosis: Positive blood culture    History:     Past Medical History:   Diagnosis Date    Addiction to drug     Alcohol abuse     Alcohol abuse, in remission 6/15/2015    14.5 weeks ago; AA weekly    Anemia     Anxiety 6/15/2015    Behavioral problem     Bipolar disorder     Bipolar disorder in remission 6/15/2015    Cirrhosis, Laennec's 6/15/2015    Depression     Encounter for blood transfusion     Epistaxis 6/15/2015    Fatigue     Glaucoma     Hematuria     Hepatic encephalopathy 6/15/2015    Hepatic enlargement     History of psychiatric care     History of psychiatric hospitalization     History of seizure 6/15/2015    1    hx of intentional Tylenol overdose 6/15/2015    2005- situational and hx of bipolar    Hx of psychiatric care     Macrocytic anemia 9/18/2015    6 units PRBC since June 2015    Jeana     Osteoarthritis     Other ascites 6/15/2015    Psychiatric exam requested by authority     Psychiatric problem     Psychosis 9/26/2019    Renal disorder     Seizures     Self-harming behavior     Suicide attempt     Therapy     Thrombocytopenia 6/15/2015       Social History     Socioeconomic History    Marital status: Single   Occupational History    Occupation: Disabled     Employer: DISABLED   Tobacco Use    Smoking status: Never    Smokeless tobacco: Never   Substance and Sexual Activity    Alcohol use: Not Currently     Comment: hx of ETOH abuse with cirrhosis of liver    Drug use: No    Sexual activity: Not Currently   Other Topics Concern    Patient feels they ought to cut down on drinking/drug use No    Patient annoyed by others criticizing their drinking/drug use No    Patient has felt bad or guilty about drinking/drug use No    Patient has had a drink/used drugs as an eye opener in the AM No   Social History Narrative    Pt has 1 older and 1 younger sister.  Her father committed suicide  Spoke with patient, explained next year schedule is not out and will defer order to November 2020    when she was 18.  She has a [employer] degree and worked as an , but she has been disabled since age 40.  She never  and has no children.  She is not dating and claims no current friends.  She currently lives with her mother along with her pet dog.  She denies any hobbies and says she is not [employer].     Social Determinants of Health     Financial Resource Strain: Low Risk  (8/15/2023)    Overall Financial Resource Strain (CARDIA)     Difficulty of Paying Living Expenses: Not hard at all   Food Insecurity: No Food Insecurity (8/15/2023)    Hunger Vital Sign     Worried About Running Out of Food in the Last Year: Never true     Ran Out of Food in the Last Year: Never true   Transportation Needs: No Transportation Needs (8/15/2023)    PRAPARE - Transportation     Lack of Transportation (Medical): No     Lack of Transportation (Non-Medical): No   Physical Activity: Inactive (8/15/2023)    Exercise Vital Sign     Days of Exercise per Week: 0 days     Minutes of Exercise per Session: 0 min   Stress: Stress Concern Present (8/15/2023)    [employer] - Occupational Stress Questionnaire     Feeling of Stress : Very much   Social Connections: Unknown (8/15/2023)    Social Connection and Isolation Panel [NHANES]     Frequency of Communication with Friends and Family: Patient refused     Frequency of Social Gatherings with Friends and Family: Patient refused     Attends Congregational Services: Never     Active Member of Clubs or Organizations: No     Attends Club or Organization Meetings: Never     Marital Status: Never    Housing Stability: Low Risk  (8/15/2023)    Housing Stability Vital Sign     Unable to Pay for Housing in the Last Year: No     Number of Places Lived in the Last Year: 1     Unstable Housing in the Last Year: No       Precautions:     Allergies as of 09/16/2023 - Reviewed 09/16/2023   Allergen Reaction Noted    Sulfa (sulfonamide antibiotics) Rash 11/26/2014     Codeine Nausea And Vomiting 04/26/2018       [facility] Assessment Details/Treatment   Patient seen for wound care consultation.   Reviewed chart for this encounter.   See Flow Sheet for findings.    Patient laying in bed and agreeable to assessment. The sacrum, buttocks, and perineum are intact, pink, moist and blanchable with fungal appearence. Miconazole ointment applied. Patient is unable to change positions in the bed independently and is incontinent of stool and urine. She has a urethral catheter.     RECOMMENDATIONS:  - Sacrum, buttocks, perineum, and groin: bedside nursing to cleanse with soap and water, pat dry and apply miconazole ointment BID and PRN  - Turning every 2 hours  - Heel protecting boots  - Waffle mattress overlay   - Nursing to maintain pressure injury prevention interventions        09/18/23 1206        Altered Skin Integrity 06/03/23 1300  Groin Moisture associated dermatitis   Date First Assessed/Time First Assessed: 06/03/23 1300   Altered Skin Integrity Present on Admission - Did Patient arrive to the hospital with altered skin?: yes  Side: (c)   Location: Groin  Is this injury device related?: No  Primary Wound Type: Bobby...   Dressing Appearance Open to air   Drainage Amount None   Appearance Intact;Pink;Moist   Periwound Area Intact;Moist   Care Cleansed with:;Sterile normal saline   Dressing Applied;Other (comment)  (antifungal oinment/miconazole ointment)   Periwound Care Moisture barrier applied        Altered Skin Integrity 06/29/23 1523 Sacral spine Moisture associated dermatitis   Date First Assessed/Time First Assessed: 06/29/23 1523   Altered Skin Integrity Present on Admission - Did Patient arrive to the hospital with altered skin?: suspected hospital acquired  Location: Sacral spine  Primary Wound Type: Moisture associated ...   Wound Image    Dressing Appearance Open to air   Drainage Amount None   Appearance Intact;Pink;Moist   Periwound Area Intact;Moist   Care Cleansed  with:;Soap and water   Dressing Applied;Other (comment)  (antifungal/miconazole ointment)   Periwound Care Moisture barrier applied       Recommendations made to primary team for above plan via secure chat. Orders placed. Wound care will follow-up as needed.     09/18/2023

## 2023-09-18 NOTE — SUBJECTIVE & OBJECTIVE
Interval History: bcx from  growing lactobacillus in one of two sets. ID following, suspect contaminant. Repeat blood cx pending. D/c vanc. Continue cipro for now. D/c [facility]      Review of Systems  Objective:     Vital Signs (Most Recent):  Temp: 98.1 °F (36.7 °C) (23 1601)  Pulse: 92 (23 1601)  Resp: 18 (23 1601)  BP: (!) 141/67 (23 1601)  SpO2: (!) 91 % (23 1601) Vital Signs (24h Range):  Temp:  [97.6 °F (36.4 °C)-98.1 °F (36.7 °C)] 98.1 °F (36.7 °C)  Pulse:  [77-93] 92  Resp:  [16-18] 18  SpO2:  [91 %-100 %] 91 %  BP: (105-141)/(55-67) 141/67     Weight: 57.4 kg (126 lb 8.7 oz)  Body mass index is 23.15 kg/m².    Intake/Output Summary (Last 24 hours) at 2023 1613  Last data filed at 2023 0623  Gross per 24 hour   Intake --   Output 2575 ml   Net -2575 ml         Physical Exam  Vitals and nursing note reviewed.   Constitutional:       General: She is not in acute distress.     Appearance: She is ill-appearing.   Eyes:      General: No scleral icterus.  Cardiovascular:      Rate and Rhythm: Normal rate and regular rhythm.      Heart sounds: No murmur heard.  Pulmonary:      Effort: Pulmonary effort is normal. No respiratory distress.      Breath sounds: No wheezing.      Comments: Limited anterior exam  Musculoskeletal:      Right lower le+ Pitting Edema present.      Left lower le+ Pitting Edema present.      Comments: Pitting edema into dependent bilateral thighs   Skin:     General: Skin is warm and dry.      Coloration: Skin is pale.   Neurological:      Mental Status: She is lethargic.   Psychiatric:         Cognition and Memory: Cognition is impaired. Memory is impaired.

## 2023-09-18 NOTE — CONSULTS
Therapy with vancomycin complete and/or consult discontinued by provider. Pharmacy will sign off, please re-consult as needed.    Poonam Lutz, PharmD, BCPS  [phone]

## 2023-09-18 NOTE — ASSESSMENT & PLAN NOTE
Bcx from 9/12 growing lactobacillus in one of two sets. ID following, suspect contaminant.   Repeat blood cx pending.   D/c vanc. Continue cipro for now.   D/c baugh cath

## 2023-09-18 NOTE — PLAN OF CARE
[facility] - Telemetry Stepdown  Initial Discharge Assessment       Primary Care Provider: Viktor Ross MD    Admission Diagnosis: Bacteremia [R78.81]    Admission Date: 9/16/2023  Expected Discharge Date: 9/20/2023    Transition of Care Barriers: None    Payor: HUMA[employer] MANAGED MEDICARE / Plan: HU[employer]NA MEDICARE HMO / Product Type: Capitation /     Extended Emergency Contact Information  Primary Emergency Contact: JoyZaria DC [facility]  Home Phone: [phone]  Relation: Sister  Secondary Emergency Contact: JoyBridget           [place], [place]  Home Phone: [phone]  Relation: Sister    Discharge Plan A: Skilled Nursing Facility  Discharge Plan B: Return to Nursing Home      [facility] - [facility] [place] - [id number]  [address]  [place] [id number]  Phone: [phone] Fax: [phone]      Initial Assessment (most recent)       Adult Discharge Assessment - 09/18/23 1412          Discharge Assessment    Assessment Type Discharge Planning Assessment     Source of Information health record     Reason For Admission Bacteremia     [facility] in Home facility resident     Do you expect to return to your current living situation? Yes     Prior to hospitilization cognitive status: --   Pt is confused at times.    Current cognitive status: --   Pt is confused at times.    Walking or Climbing Stairs ambulation difficulty, requires equipment     Equipment Currently Used at Home walker, rolling     Readmission within 30 days? Yes   Recently discharged on 9/15.    Patient currently being followed by outpatient case management? No     Do you currently have service(s) that help you manage your care at home? No     Do you take prescription medications? Yes     Do you have prescription coverage? Yes     Do you have any problems affording any of your prescribed medications? No     Is the patient taking medications as prescribed? yes     Are you on dialysis? No     Do  you take coumadin? No     DME Needed Upon Discharge  none     Transition of Care Barriers None     Discharge Plan A Skilled Nursing Facility     Discharge Plan B Return to Nursing Home                   Jyoti Jones RN  Ext [phone]

## 2023-09-18 NOTE — NURSING
Nurses Note -- 4 Eyes      9/17/2023    7:10 PM      Skin assessed during: Q Shift Change      [] No Altered Skin Integrity Present    []Prevention Measures Documented      [x] Yes- Altered Skin Integrity Present or Discovered   [] LDA Added if Not in Epic (Describe Wound)   [] New Altered Skin Integrity was Present on Admit and Documented in LDA   [] Wound Image Taken    Wound Care Consulted? No    Attending Nurse:  Evangelina Rangel RN/Staff Member:   Karin CALVERT

## 2023-09-18 NOTE — NURSING
Nurses Note -- 4 Eyes      9/18/2023   7:30 AM      Skin assessed during: Q Shift Change      [] No Altered Skin Integrity Present    []Prevention Measures Documented      [x] Yes- Altered Skin Integrity Present or Discovered   [] LDA Added if Not in Epic (Describe Wound)   [] New Altered Skin Integrity was Present on Admit and Documented in LDA   [] Wound Image Taken    Wound Care Consulted? No    Attending Nurse:  Karin Rangel RN/Staff Member:   Evangelina CALVERT

## 2023-09-18 NOTE — PROGRESS NOTES
Pharmacist Intervention IV to PO Note    Marely Hamilton is a 58 y.o. female being treated with IV medication ciprofloxacin    Patient Data:    Vital Signs (Most Recent):  Temp: 98 °F (36.7 °C) (09/18/23 0911)  Pulse: 88 (09/18/23 0911)  Resp: 16 (09/18/23 0911)  BP: (!) 131/59 (09/18/23 0932)  SpO2: (!) 91 % (09/18/23 0911) Vital Signs (72h Range):  Temp:  [97.4 °F (36.3 °C)-98.4 °F (36.9 °C)]   Pulse:  [55-91]   Resp:  [12-20]   BP: (100-148)/(52-76)   SpO2:  [91 %-100 %]      CBC:  Recent Labs   Lab 09/14/23  0658 09/15/23  0308 09/17/23  0254   WBC 3.41*  3.41* 6.60  6.60 3.29*   RBC 2.26*  2.26* 2.42*  2.42* 2.02*   HGB 8.1*  8.1* 8.5*  8.5* 7.2*   HCT 25.0*  25.0* 26.0*  26.0* 23.2*   PLT 69*  69* 80*  80* 52*   *  111* 107*  107* 115*   MCH 35.8*  35.8* 35.1*  35.1* 35.6*   MCHC 32.4  32.4 32.7  32.7 31.0*     CMP:     Recent Labs   Lab 09/14/23  0658 09/15/23  0308 09/17/23  0254 09/17/23  1344   * 147* 119* 91   CALCIUM 7.8* 7.6* 7.3* 7.6*   ALBUMIN 1.7* 1.8* 1.4*  --    PROT 5.4* 5.6* 4.2*  --     139 139 138   K 3.3* 3.1* 3.0* 3.1*   CO2 20* 14* 19* 17*   * 114* 114* 113*   BUN 8 8 6 6   CREATININE 0.5 0.6 0.5 0.5   ALKPHOS 141* 187* 131  --    ALT 13 16 17  --    AST 35 41* 36  --    BILITOT 3.0* 2.8* 2.4*  --        Dietary Orders:  Diet Orders            Diet Low Sodium, 2gm: Low Sodium, 2gm starting at 09/17 1453            Based on the following criteria, this patient qualifies for intravenous to oral conversion:  [x] The patients gastrointestinal tract is functioning (tolerating medications via oral or enteral route for 24 hours and tolerating food or enteral feeds for 24 hours).  [x] The patient is hemodynamically stable for 24 hours (heart rate <100 beats per minute, systolic blood pressure >99 mm Hg, and respiratory rate <20 breaths per minute).  [x] The patient shows clinical improvement (afebrile for at least 24 hours and white blood cell count  downtrending or normalized). Additionally, the patient must be non-neutropenic (absolute neutrophil count >500 cells/mm3).  [x] For antimicrobials, the patient has received IV therapy for at least 24 hours.    Cipro 400mg IV q12h was changed to cipro 500mg PO q12h    Poonam Lutz, PharmD, BCPS  [phone]

## 2023-09-18 NOTE — HOSPITAL COURSE
bcx from 9/12 growing lactobacillus in one of two sets. IID consulted, suspect contaminant. Repeat blood negative D/c vanc. Continue cipro x 3 days for UTI. D/c baugh cath. Refusing lactulose.

## 2023-09-18 NOTE — PROGRESS NOTES
[facility] - Tele[facility]  Progress Note    Patient Name: Marely Hamilton  MRN: [id number]  Patient Class: IP- Inpatient   Admission Date: 9/16/2023  Length of Stay: 1 days  Attending Physician: Pancho Harris MD  Primary Care Provider: Viktor Ross MD        Subjective:     Principal Problem:Positive blood culture        HPI:      Per ED note  58-year-old female, with history of alcoholic cirrhosis, hepatic encephalopathy, recurrent GI bleed, chronic DVT status post IVC filter, sent to the ED from nursing home for positive blood culture.  Patient was recently discharged from the hospital on August 15, for COVID-19 pneumonia.  Her blood culture returned positive today with Gram-positive, shortness in back to the hospital for further management.  Patient complain of diarrhea no vomiting, abdominal pain, shortness of breath or fever. Patient has an indwelling catheter, but she doesn't why she needed or when was the last time it was changed.    Patient cannot provide history she is sleeping and difficult to wake secondary to receipt of chemical restraint in ED, she required chemical restraint - benadryl, haldol, ativan IM injections in order for nursing staff to place IV    She has received 1 dose of Zosyn.  Vancomycin ordered-pending administration    1 of 4 blood cultures from 9/14 - Right arm grew Gram Positive Leonel      Overview/Hospital Course:  bcx from 9/12 growing lactobacillus in one of two sets. ID following, suspect contaminant. Repeat blood cx pending. D/c vanc. Continue cipro for now. D/c baugh cath. Added PO lasix and aldactone for volume overload.        Interval History: bcx from 9/12 growing lactobacillus in one of two sets. ID following, suspect contaminant. Repeat blood cx pending. D/c vanc. Continue cipro for now. D/c baugh cath      Review of Systems  Objective:     Vital Signs (Most Recent):  Temp: 98.1 °F (36.7 °C) (09/18/23 1601)  Pulse: 92 (09/18/23 1601)  Resp: 18  (23 1601)  BP: (!) 141/67 (23 1601)  SpO2: (!) 91 % (23 1601) Vital Signs (24h Range):  Temp:  [97.6 °F (36.4 °C)-98.1 °F (36.7 °C)] 98.1 °F (36.7 °C)  Pulse:  [77-93] 92  Resp:  [16-18] 18  SpO2:  [91 %-100 %] 91 %  BP: (105-141)/(55-67) 141/67     Weight: 57.4 kg (126 lb 8.7 oz)  Body mass index is 23.15 kg/m².    Intake/Output Summary (Last 24 hours) at 2023 1613  Last data filed at 2023 0623  Gross per 24 hour   Intake --   Output 2575 ml   Net -2575 ml         Physical Exam  Vitals and nursing note reviewed.   Constitutional:       General: She is not in acute distress.     Appearance: She is ill-appearing.   Eyes:      General: No scleral icterus.  Cardiovascular:      Rate and Rhythm: Normal rate and regular rhythm.      Heart sounds: No murmur heard.  Pulmonary:      Effort: Pulmonary effort is normal. No respiratory distress.      Breath sounds: No wheezing.      Comments: Limited anterior exam  Musculoskeletal:      Right lower le+ Pitting Edema present.      Left lower le+ Pitting Edema present.      Comments: Pitting edema into dependent bilateral thighs   Skin:     General: Skin is warm and dry.      Coloration: Skin is pale.   Neurological:      Mental Status: She is lethargic.   Psychiatric:         Cognition and Memory: Cognition is impaired. Memory is impaired.                   Assessment/Plan:      * Positive blood culture  Bcx from  growing lactobacillus in one of two sets. ID following, suspect contaminant.   Repeat blood cx pending.   D/c vanc. Continue cipro for now.   D/c baugh cath          Pneumonia due to COVID-19 virus  -Patient recently admitted and had received remdesivir and dexamethasone while inpatient.   -currently patient is on room air w/o O2 requirement.    -Repeat CXR does not show new or worsening airspace disease      Infection Control notification  and isolation- respiratory, contact and droplet per protocol    Diagnostics: CBC, CMP,  CRP, Blood Culture x2 and Portable CXR    Management: Inhaled bronchodilators as needed for shortness of breath.    Advance Care Planning  Current advance care plan has not been discussed with patient/family/POA and patient currently wishes Full Code.  (as per prior admission, patient can't participate tonight)     Acute cystitis  She was diagnosed with cystitis at last admit on 9/12.   Her urine culture had Multi-drug resistant E.Coli,  Patient received 4 doses of ceftriaxone.   -E.coli was RESISTANT to Ceftriaxone     -Based on Culture/Sensitivity - Give 3 days of Ciprofloxacin.       Deep vein thrombosis (DVT) of femoral vein of right lower extremity  S/p IVC filter placement for hx of bilateral VTE  -hx of recurrent GI bleeding and thrombocytopenia - patient is not anticoagulation candidate.       Bipolar 1 disorder, depressed, severe  Resume lithium when appropriate.       Hepatic encephalopathy  Resume home Oral lactulose when patient safe to take oral medications.       Chronic liver failure  Whe patient is awake and alert - restart Lactulose TID     She has significant peripheral edema.  - Home medications have Furosemide 20mg    -Recommend starting patient on Furosemide 40mg and Spironolactone 100mg or   Furosemide 20m/Spironolactone 50mg as oral diuretisc for management of peripheral edema/volume overload in cirrhosis.   She can't take PO safely now - start when she can.      History of seizure  Restart home keppra - will order as IV for now - switch to Po when appropriate.         VTE Risk Mitigation (From admission, onward)    None          Discharge Planning   BRAYAN: 9/20/2023     Code Status: Full Code   Is the patient medically ready for discharge?: No    Reason for patient still in hospital (select all that apply): Patient trending condition  Discharge Plan A: Skilled Nursing Facility                  Pancho Harris MD  Department of Hospital Medicine   [facility] - Telemetry Stepdown

## 2023-09-19 PROCEDURE — 99233 SBSQ HOSP IP/OBS HIGH 50: CPT | Mod: ,,, | Performed by: INTERNAL MEDICINE

## 2023-09-19 PROCEDURE — 94761 N-INVAS EAR/PLS OXIMETRY MLT: CPT

## 2023-09-19 PROCEDURE — 27000207 HC ISOLATION

## 2023-09-19 PROCEDURE — 94640 AIRWAY INHALATION TREATMENT: CPT | Mod: XB

## 2023-09-19 PROCEDURE — 25000003 PHARM REV CODE 250: Performed by: STUDENT IN AN ORGANIZED HEALTH CARE EDUCATION/TRAINING PROGRAM

## 2023-09-19 PROCEDURE — 97165 OT EVAL LOW COMPLEX 30 MIN: CPT

## 2023-09-19 PROCEDURE — 99900035 HC TECH TIME PER 15 MIN (STAT)

## 2023-09-19 PROCEDURE — 20600001 HC STEP DOWN PRIVATE ROOM

## 2023-09-19 PROCEDURE — 97163 PT EVAL HIGH COMPLEX 45 MIN: CPT

## 2023-09-19 PROCEDURE — 25000003 PHARM REV CODE 250: Performed by: HOSPITALIST

## 2023-09-19 PROCEDURE — 99233 PR SUBSEQUENT HOSPITAL CARE,LEVL III: ICD-10-PCS | Mod: ,,, | Performed by: INTERNAL MEDICINE

## 2023-09-19 RX ORDER — LACTULOSE 10 G/15ML
30 SOLUTION ORAL
Status: DISCONTINUED | OUTPATIENT
Start: 2023-09-19 | End: 2023-09-20

## 2023-09-19 RX ADMIN — PANTOPRAZOLE SODIUM 40 MG: 40 GRANULE, DELAYED RELEASE ORAL at 08:09

## 2023-09-19 RX ADMIN — Medication 6 MG: at 08:09

## 2023-09-19 RX ADMIN — ALBUTEROL SULFATE 2 PUFF: 108 INHALANT RESPIRATORY (INHALATION) at 01:09

## 2023-09-19 RX ADMIN — MICONAZOLE NITRATE: 20 OINTMENT TOPICAL at 08:09

## 2023-09-19 RX ADMIN — CIPROFLOXACIN 500 MG: 500 TABLET, FILM COATED ORAL at 08:09

## 2023-09-19 RX ADMIN — Medication 500 MG: at 08:09

## 2023-09-19 RX ADMIN — ALBUTEROL SULFATE 2 PUFF: 108 INHALANT RESPIRATORY (INHALATION) at 08:09

## 2023-09-19 RX ADMIN — ALBUTEROL SULFATE 2 PUFF: 108 INHALANT RESPIRATORY (INHALATION) at 02:09

## 2023-09-19 RX ADMIN — THERA TABS 1 TABLET: TAB at 08:09

## 2023-09-19 RX ADMIN — LEVETIRACETAM 1000 MG: 100 SOLUTION ORAL at 08:09

## 2023-09-19 RX ADMIN — FUROSEMIDE 40 MG: 40 TABLET ORAL at 08:09

## 2023-09-19 RX ADMIN — LITHIUM CARBONATE 300 MG: 300 TABLET, FILM COATED, EXTENDED RELEASE ORAL at 08:09

## 2023-09-19 RX ADMIN — RIFAXIMIN 550 MG: 550 TABLET ORAL at 08:09

## 2023-09-19 RX ADMIN — ACETAMINOPHEN 500 MG: 500 TABLET ORAL at 08:09

## 2023-09-19 RX ADMIN — LEVETIRACETAM 1000 MG: 100 SOLUTION ORAL at 09:09

## 2023-09-19 RX ADMIN — SPIRONOLACTONE 100 MG: 100 TABLET ORAL at 09:09

## 2023-09-19 RX ADMIN — ALBUTEROL SULFATE 2 PUFF: 108 INHALANT RESPIRATORY (INHALATION) at 07:09

## 2023-09-19 NOTE — PLAN OF CARE
Problem: Physical Therapy  Goal: Physical Therapy Goal  Description: Goals to be met by: 10/16/23     Patient will increase functional independence with mobility by performin. Supine to sit with Labette  2. Sit to supine with Labette  3. Sit to stand transfer with Maximum Assistance  4. Bed to chair stand pivot transfer with Maximum Assistance using No Assistive Device  5. Increased functional strength to WFL for bilateral LE.    Outcome: Ongoing, Progressing     PT eval completed and goals established. Pt will begin PT POC, progressing as tolerated.

## 2023-09-19 NOTE — PLAN OF CARE
09/19/23 1547   Post-Acute Status   Post-Acute Authorization Placement   Post-Acute Placement Status Referrals Sent     Referral sent to [facility] via [facility].    Jyoti Jones RN  Ext [phone]

## 2023-09-19 NOTE — NURSING
Nurses Note -- 4 Eyes      9/18/2023   7:30 PM      Skin assessed during: Q Shift Change      [] No Altered Skin Integrity Present    []Prevention Measures Documented      [x] Yes- Altered Skin Integrity Present or Discovered   [] LDA Added if Not in Epic (Describe Wound)   [] New Altered Skin Integrity was Present on Admit and Documented in LDA   [] Wound Image Taken    Wound Care Consulted? No    Attending Nurse:  Evangelina Rangel RN/Staff Member:   Karin CALVERT

## 2023-09-19 NOTE — PT/OT/SLP EVAL
"Physical Therapy Co-Evaluation with OT     Patient Name:  Marely Hamilton   MRN:  [id number]    Recommendations:     Discharge Recommendations: nursing facility, skilled   Discharge Equipment Recommendations: none   Barriers to discharge: None    Assessment:     Marely Hamilton is a 58 y.o. female admitted with a medical diagnosis of Positive blood culture.  She presents with the following impairments/functional limitations: weakness, impaired functional mobility, decreased lower extremity function. Patient performed bed mobility with maximal assistance of 2. Recommending discharge to SNF to address deficits and reduce caregiver burden.   Co-evaluation with OT performed due to patient complexity and deficits, requiring two skilled therapists to appropriately and safely assess patient's strength and endurance while facilitating functional tasks in addition to accommodating for patient's activity tolerance.      Rehab Prognosis: Good; patient would benefit from acute skilled PT services to address these deficits and reach maximum level of function.    Recent Surgery: * No surgery found *      Plan:     During this hospitalization, patient to be seen 3 x/week to address the identified rehab impairments via gait training, therapeutic activities, therapeutic exercises, neuromuscular re-education, wheelchair management/training and progress toward the following goals:    Plan of Care Expires:  10/19/23    Subjective     Chief Complaint: back/butt pain when transferring   Patient/Family Comments/goals: "my legs look a lot better!" Re: BLE edema.  Pain/Comfort:  Pain Rating 1: 0/10  Pain Rating Post-Intervention 1: 0/10    Patients cultural, spiritual, Oriental orthodox conflicts given the current situation: no    Living Environment:  Patient  Prior to admission, patients level of function was maximal assistance for transfers from bed to wheelchair at nursing home (patient physically lifted from bed to chair), no out of bed " mobility. Prior to this, patient was more independent with activity at home.  Equipment used at home: wheelchair (at nursing home).  DME owned (not currently used): none.  Upon discharge, patient will have assistance from nursing home.    Objective:     Communicated with RN prior to session.  Patient found HOB elevated with baugh catheter, telemetry  upon PT entry to room.    General Precautions: Standard, fall, contact, droplet, airborne  Orthopedic Precautions:N/A   Braces: N/A  Respiratory Status: Room air    Exams:  Cognitive Exam:  Patient is oriented to Person, Place, Time, and Situation  Sensation:    -       Intact  Skin Integrity/Edema:      -       Edema: Pitting bilateral LE  RLE ROM: WNL  RLE Strength: 3+/5 knee flexion, ankle DF  LLE ROM: WNL  LLE Strength: 3+/5 knee flexion, ankle DF    Functional Mobility:  Bed Mobility:     Scooting: maximal assistance  Supine to Sit: maximal assistance and of 2 persons  Sit to Supine: maximal assistance and of 2 persons    Balance: EOB balance SBA    AM-PAC 6 CLICK MOBILITY  Total Score:10       Treatment & Education:  Education: patient educated on POC, need for therapy, safety with mobility, discharge recommendations and equipment recommendations. Patient verbalized understanding of all topics.      Patient left HOB elevated with all lines intact and call button in reach.    GOALS:   Multidisciplinary Problems       Physical Therapy Goals          Problem: Physical Therapy    Goal Priority Disciplines Outcome Goal Variances Interventions   Physical Therapy Goal     PT, PT/OT Ongoing, Progressing     Description: Goals to be met by: 10/16/23     Patient will increase functional independence with mobility by performin. Supine to sit with [place]  2. Sit to supine with [place]  3. Sit to stand transfer with Maximum Assistance  4. Bed to chair stand pivot transfer with Maximum Assistance using No Assistive Device  5. Increased functional strength to  WFL for bilateral LE.                         History:     Past Medical History:   Diagnosis Date    Addiction to drug     Alcohol abuse     Alcohol abuse, in remission 6/15/2015    14.5 weeks ago; AA weekly    Anemia     Anxiety 6/15/2015    Behavioral problem     Bipolar disorder     Bipolar disorder in remission 6/15/2015    Cirrhosis, Laennec's 6/15/2015    Depression     Encounter for blood transfusion     Epistaxis 6/15/2015    Fatigue     Glaucoma     Hematuria     Hepatic encephalopathy 6/15/2015    Hepatic enlargement     History of psychiatric care     History of psychiatric hospitalization     History of seizure 6/15/2015    1    hx of intentional Tylenol overdose 6/15/2015    2005- situational and hx of bipolar    Hx of psychiatric care     Macrocytic anemia 9/18/2015    6 units PRBC since June 2015    [place]ana     Osteoarthritis     Other ascites 6/15/2015    Psychiatric exam requested by authority     Psychiatric problem     Psychosis 9/26/2019    Renal disorder     Seizures     Self-harming behavior     Suicide attempt     Therapy     Thrombocytopenia 6/15/2015       Past Surgical History:   Procedure Laterality Date    COSMETIC SURGERY      EMBOLIZATION N/A 6/11/2023    Procedure: EMBOLIZATION, BLOOD VESSEL;  Surgeon: Debbie Surgeon;  [facility];  Service: Anesthesiology;  Laterality: N/A;    ESOPHAGOGASTRODUODENOSCOPY      ESOPHAGOGASTRODUODENOSCOPY N/A 7/6/2023    Procedure: EGD (ESOPHAGOGASTRODUODENOSCOPY);  Surgeon: Bernice Guillen MD;  Location: [address]);  Service: Endoscopy;  Laterality: N/A;    ESOPHAGOGASTRODUODENOSCOPY N/A 7/11/2023    Procedure: EGD (ESOPHAGOGASTRODUODENOSCOPY);  Surgeon: Rangel Broussard MD;  Location: [address]);  Service: Endoscopy;  Laterality: N/A;       Time Tracking:     PT Received On: 09/19/23  PT Start Time: 0904     PT Stop Time: 0923  PT Total Time (min): 19 min     Billable Minutes: Evaluation 19 minutes      09/19/2023

## 2023-09-19 NOTE — DISCHARGE SUMMARY
"DISCHARGE SUMMARY  Hospital Medicine    Team: [facility]    Patient Name: Marely Hamilton  YOB: 1965    Admit Date: 8/31/2023    Discharge Date: 9/7/2023    Discharge Attending Physician: Lenka Ortiz     Admitting Resident    Diagnoses:  Active Hospital Problems    Diagnosis  POA    *Hepatic encephalopathy [K76.82]  Yes    Acute cystitis [N30.00]  Yes    Chronic deep vein thrombosis (DVT) of right lower extremity [I82.501]  Yes    Hypokalemia [E87.6]  Yes    Alcoholic cirrhosis [K70.30]  Yes     Last Assessment & Plan:   Formatting of this note might be different from the original.  This is reportedly due to tylenol OD and alcohol abuse per her sister. She has severe disease with elevated INR and MELD score of 25. She is at her baseline per her PCP. She was continued on rifaximin and encouraged to take lactulose. She was started on propranolol temporary for significant tachycardia. She is reportedly followed by Dr. Colt PATTEN, at [facility].  Formatting of this note might be different from the original.  Last Assessment & Plan:   Formatting of this note might be different from the original.  This is reportedly due to tylenol OD and alcohol abuse per her sister. She has severe disease with elevated INR and MELD score of 25. She is at her baseline per her PCP. She was continued on rifaximin and encouraged to take lactulose. She was started on propranolol temporary for significant tachycardia. She is reportedly followed by Dr. Colt PATTEN, at [facility].      Severe protein-calorie malnutrition [E43]  Yes    History of seizure [Z87.898]  Yes     One seizure 2013- "low blood sugar from a starvation diet"      Thrombocytopenia [D69.6]  Yes      Resolved Hospital Problems   No resolved problems to display.       Discharged Condition: admit problems have stabilized     HOSPITAL COURSE:      Initial Presentation:    57 yo F with PMHx alcoholic cirrhosis, recurrent GI bleeds, and chronic " "LE DVT s/p IVC filter placement who presents to the ED from [facility] for reported "shortness of breath, high ammonia levels and constipation x a few days". Pt. Mildly confused at time of my interview, so chronology of events is limited, but the patietn reports that she has had worsening swelling in her feet and abdomen as well as some SOB. She believes the fluid in her abdomen and legs is causing her to feel weak and SOB. She also has had constipation despite being on lactulose for HE. She denies any fevers, chills, nausea, or vomiting. Of note, pt. Recently admitted to [facility] for E.coli bacteremia thought to be related to a UTI. ID recommended midline placement for ceftriaxone 2gm IV daily for total of 2 wks from 8/14 to 8/28/23. Pt. Reports that she completed this and her midline was removed.           Overview/Hospital Course:  57 yo F with PMHx alcoholic cirrhosis, recurrent GI bleeds, and chronic LE DVT s/p IVC filter placement who presents to the ED from [facility] for confusion, admitted for HE. Mentation back to baseline with lactulose. Patient came from snf care facility. Now awaiting placement in SNF        Course of Principle Problem for Admission:    Hepatic encephalopathy resolved  -Pt. With worsening confusion, reported to be constipated, likely not taking adequate dose of lactulose  -Ammonia elevated to 79 on presentation   - Continue lactulose and ammonia.   -Infectious work up neg so far     Alcoholic cirrhosis  MELD 3.0: 21 at 8/31/2023  5:08 PM  MELD-Na: 17 at 8/31/2023  5:08 PM  Calculated from:  Serum Creatinine: 0.5 mg/dL (Using min of 1 mg/dL) at 8/31/2023  5:08 PM  Serum Sodium: 142 mmol/L (Using max of 137 mmol/L) at 8/31/2023  5:08 PM  Total Bilirubin: 3.0 mg/dL at 8/31/2023  5:08 PM  Serum Albumin: 1.7 g/dL at 8/31/2023  5:08 PM  INR(ratio): 1.8 at 8/31/2023  5:08 PM  Age at listing (hypothetical): 58 years  Sex: Female at 8/31/2023  5:08 PM    Other Medical " Problems Addressed in the Hospital:    Acute cystitis  U/A with >100 WBC, many bacteria. UTI suspected to be source of recent bactermia, will treat with ceftriaxone,     CONSULTS: hepatology     Last CBC/BMP/HgbA1c (if applicable):  Recent Results (from the past 336 hour(s))   CBC with Automated Differential    Collection Time: 09/15/23  3:08 AM   Result Value Ref Range    WBC 6.60 3.90 - 12.70 K/uL    Hemoglobin 8.5 (L) 12.0 - 16.0 g/dL    Hematocrit 26.0 (L) 37.0 - 48.5 %    Platelets 80 (L) 150 - 450 K/uL   CBC Auto Differential    Collection Time: 09/15/23  3:08 AM   Result Value Ref Range    WBC 6.60 3.90 - 12.70 K/uL    Hemoglobin 8.5 (L) 12.0 - 16.0 g/dL    Hematocrit 26.0 (L) 37.0 - 48.5 %    Platelets 80 (L) 150 - 450 K/uL   CBC with Automated Differential    Collection Time: 09/14/23  6:58 AM   Result Value Ref Range    WBC 3.41 (L) 3.90 - 12.70 K/uL    Hemoglobin 8.1 (L) 12.0 - 16.0 g/dL    Hematocrit 25.0 (L) 37.0 - 48.5 %    Platelets 69 (L) 150 - 450 K/uL     Recent Results (from the past 336 hour(s))   Basic metabolic panel    Collection Time: 09/17/23  1:44 PM   Result Value Ref Range    Sodium 138 136 - 145 mmol/L    Potassium 3.1 (L) 3.5 - 5.1 mmol/L    Chloride 113 (H) 95 - 110 mmol/L    CO2 17 (L) 23 - 29 mmol/L    BUN 6 6 - 20 mg/dL    Creatinine 0.5 0.5 - 1.4 mg/dL    Calcium 7.6 (L) 8.7 - 10.5 mg/dL    Anion Gap 8 8 - 16 mmol/L     Lab Results   Component Value Date    HGBA1C <4.0 (A) 05/29/2023     Disposition:  SNF      Future Scheduled Appointments:  Future Appointments   Date Time Provider Department Center   10/10/2023  3:30 PM Analisa Alvarado MD [facility] HEPAT [facility]       Follow-up Plans from This Hospitalization:  Hepatology     Discharge Medication List:        Medication List        CHANGE how you take these medications      ALPRAZolam 0.25 MG tablet  Commonly known as: XANAX  Take 1 tablet (0.25 mg total) by mouth 2 (two) times daily as needed for Anxiety.  What changed:   when to  take this  reasons to take this     lactulose 20 gram/30 mL Soln  Commonly known as: CHRONULAC  Take 45 mLs (30 g total) by mouth 3 (three) times daily. TITRATE TO 3-4 BOWEL MOVEMENTS DAILY.  What changed: additional instructions            CONTINUE taking these medications      ferrous sulfate 325 mg (65 mg iron) Tab tablet  Commonly known as: FEOSOL     furosemide 20 MG tablet  Commonly known as: LASIX     levetiracetam 500 mg/5 mL (5 mL) Soln  Take 10 mLs (1,000 mg total) by mouth 2 (two) times daily.     lithium 300 MG CR tablet  Commonly known as: LITHOBID  Take 1 tablet (300 mg total) by mouth every evening.     OLANZapine 5 MG tablet  Commonly known as: ZyPREXA  Take 1 tablet (5 mg total) by mouth every evening.     pantoprazole 40 mg suspension  Commonly known as: PROTONIX  Take 1 packet (40 mg total) by mouth 2 (two) times daily.     rifAXIMin 550 mg Tab  Commonly known as: XIFAXAN  Take 1 tablet (550 mg total) by mouth 2 (two) times daily.               Where to Get Your Medications        Information about where to get these medications is not yet available    Ask your nurse or doctor about these medications  ALPRAZolam 0.25 MG tablet  lactulose 20 gram/30 mL Soln         Patient Instructions:  No discharge procedures on file.    Signing Physician:  Lenka Ortiz MD

## 2023-09-19 NOTE — PLAN OF CARE
Problem: Occupational Therapy  Goal: Occupational Therapy Goal  Description: Goals to be met by: 10/19     Patient will increase functional independence with ADLs by performing:    UE Dressing with Stand-by Assistance.  LE Dressing with Moderate Assistance.  Grooming while seated with Set-up Assistance.  Toileting from bedside commode with Moderate Assistance for hygiene and clothing management.   Sitting at edge of bed x15 minutes with Supervision.  Rolling to Bilateral with Supervision.   Supine to sit with SBA.  Stand pivot transfers with Moderate Assistance.    Outcome: Ongoing, Progressing     OT eval completed.

## 2023-09-19 NOTE — PT/OT/SLP EVAL
Occupational Therapy   Co-Evaluation    Name: Marely Hamilton  MRN: [id number]  Admitting Diagnosis: Positive blood culture  Recent Surgery: * No surgery found *      Recommendations:     Discharge Recommendations: nursing facility, skilled  Discharge Equipment Recommendations:  to be determined by next level of care  Barriers to discharge:   (increased (A) required)    Assessment:     Marely Hamilton is a 58 y.o. female with a medical diagnosis of Positive blood culture.  She presents with decreased independence with ADL's. Performance deficits affecting function: weakness, impaired endurance, impaired self care skills, impaired functional mobility, impaired balance, impaired cognition, decreased upper extremity function, decreased lower extremity function, decreased safety awareness, pain.  Co-evaluation/treatment performed due to patient's multiple deficits potentially requiring two skilled therapists to safely assess patient's ability to complete ADLs and functional mobility in addition to accommodating for patient's activity tolerance/willingness to participate in multiple sessions a day while in acute care setting.      Rehab Prognosis: Fair; patient would benefit from acute skilled OT services to address these deficits and reach maximum level of function.       Plan:     Patient to be seen 3 x/week to address the above listed problems via self-care/home management, therapeutic activities, therapeutic exercises, neuromuscular re-education, cognitive retraining  Plan of Care Expires: 10/19/23  Plan of Care Reviewed with: patient    Subjective     Chief Complaint: none stated  Patient/Family Comments/goals: Pt reported that she hopes to be able to get into a wheelchair.    Occupational Profile:  Living Environment: Pt is currently at [facility] for therapy.  Prior to admit several months ago pt was able to perform ADL's however since June 2023 has required (A) with all ADL's & transfers.  Pt reported that they  just dependently lift her at the NH.  Pt does not drive & is disabled.  Pt enjoys drawing.  Equipment Used at Home: wheelchair, walker, rolling  Assistance upon Discharge: NH staff    Pain/Comfort:  Pain Rating 1: 0/10  Pain Rating Post-Intervention 1: 0/10    Patients cultural, spiritual, Latter-day conflicts given the current situation: no    Objective:     Communicated with: RN prior to session.  Patient found supine with telemetry, baugh catheter (no family present) upon OT entry to room.    General Precautions: Standard, fall, droplet, contact, airborne  Orthopedic Precautions: N/A  Braces: N/A    Occupational Performance:    Bed Mobility:    Patient completed Scooting/Bridging with total assistance forward on EOB; drawsheet transfer up [facility] while supine  Patient completed Supine to Sit with maximal assistance  Patient completed Sit to Supine with maximal assistance    Activities of Daily Living:  Upper Body Dressing: minimum assistance with donning gown around back while seated EOB    Cognitive/Visual Perceptual:  Cognitive/Psychosocial Skills:     -       Oriented to: Person, Place, Time, and Situation   -       Follows Commands/attention:Follows one-step commands  -       Communication: clear/fluent  -       Safety awareness/insight to disability: impaired     Physical Exam:  Sensation:    -       Intact  Dominant hand:    -       right  Upper Extremity Range of Motion:  BUE WFL  Upper Extremity Strength: 3+/5 BUE grossly   Strength: fair BUE    AMPAC 6 Click ADL:  AMPAC Total Score: 13    Treatment & Education:  Provided education regarding role of OT, POC, & discharge recommendations with pt  verbalizing understanding.  Pt had no further questions & when asked whether there were any concerns pt reported none.      Patient left supine with all lines intact, call button in reach, bed alarm on, and RN notified    GOALS:   Multidisciplinary Problems       Occupational Therapy Goals          Problem:  Occupational Therapy    Goal Priority Disciplines Outcome Interventions   Occupational Therapy Goal     OT, PT/OT Ongoing, Progressing    Description: Goals to be met by: 10/19     Patient will increase functional independence with ADLs by performing:    UE Dressing with Stand-by Assistance.  LE Dressing with Moderate Assistance.  Grooming while seated with Set-up Assistance.  Toileting from bedside commode with Moderate Assistance for hygiene and clothing management.   Sitting at edge of bed x15 minutes with Supervision.  Rolling to Bilateral with Supervision.   Supine to sit with SBA.  Stand pivot transfers with Moderate Assistance.                         History:     Past Medical History:   Diagnosis Date    Addiction to drug     Alcohol abuse     Alcohol abuse, in remission 6/15/2015    14.5 weeks ago; AA weekly    Anemia     Anxiety 6/15/2015    Behavioral problem     Bipolar disorder     Bipolar disorder in remission 6/15/2015    Cirrhosis, Laennec's 6/15/2015    Depression     Encounter for blood transfusion     Epistaxis 6/15/2015    Fatigue     Glaucoma     Hematuria     Hepatic encephalopathy 6/15/2015    Hepatic enlargement     History of psychiatric care     History of psychiatric hospitalization     History of seizure 6/15/2015    1    hx of intentional Tylenol overdose 6/15/2015    2005- situational and hx of bipolar    Hx of psychiatric care     Macrocytic anemia 9/18/2015    6 units PRBC since June 2015    Jaena     Osteoarthritis     Other ascites 6/15/2015    Psychiatric exam requested by authority     Psychiatric problem     Psychosis 9/26/2019    Renal disorder     Seizures     Self-harming behavior     Suicide attempt     Therapy     Thrombocytopenia 6/15/2015         Past Surgical History:   Procedure Laterality Date    COSMETIC SURGERY      EMBOLIZATION N/A 6/11/2023    Procedure: EMBOLIZATION, BLOOD VESSEL;  Surgeon: Debbie Surgeon;  Location: [facility];  Service: Anesthesiology;  Laterality:  N/A;    ESOPHAGOGASTRODUODENOSCOPY      ESOPHAGOGASTRODUODENOSCOPY N/A 7/6/2023    Procedure: EGD (ESOPHAGOGASTRODUODENOSCOPY);  Surgeon: Bernice Guillen MD;  Location: [facility] ([address] [place] [address]);  Service: Endoscopy;  Laterality: N/A;    ESOPHAGOGASTRODUODENOSCOPY N/A 7/11/2023    Procedure: EGD (ESOPHAGOGASTRODUODENOSCOPY);  Surgeon: Rangel Broussard MD;  Location: [facility] ([address] [place] [address]);  Service: Endoscopy;  Laterality: N/A;       Time Tracking:     OT Date of Treatment: 09/19/23  OT Start Time: 0904  OT Stop Time: 0923  OT Total Time (min): 19 min    Billable Minutes:Evaluation 19 9/19/2023

## 2023-09-20 PROCEDURE — 20600001 HC STEP DOWN PRIVATE ROOM

## 2023-09-20 PROCEDURE — 94640 AIRWAY INHALATION TREATMENT: CPT

## 2023-09-20 PROCEDURE — 94761 N-INVAS EAR/PLS OXIMETRY MLT: CPT

## 2023-09-20 PROCEDURE — 25000003 PHARM REV CODE 250: Performed by: INTERNAL MEDICINE

## 2023-09-20 PROCEDURE — 25000003 PHARM REV CODE 250: Performed by: HOSPITALIST

## 2023-09-20 PROCEDURE — 99233 PR SUBSEQUENT HOSPITAL CARE,LEVL III: ICD-10-PCS | Mod: ,,, | Performed by: INTERNAL MEDICINE

## 2023-09-20 PROCEDURE — 25000003 PHARM REV CODE 250: Performed by: STUDENT IN AN ORGANIZED HEALTH CARE EDUCATION/TRAINING PROGRAM

## 2023-09-20 PROCEDURE — 99233 SBSQ HOSP IP/OBS HIGH 50: CPT | Mod: ,,, | Performed by: INTERNAL MEDICINE

## 2023-09-20 RX ORDER — LACTULOSE 10 G/15ML
30 SOLUTION ORAL 3 TIMES DAILY
Status: DISCONTINUED | OUTPATIENT
Start: 2023-09-20 | End: 2023-09-21 | Stop reason: HOSPADM

## 2023-09-20 RX ADMIN — ALBUTEROL SULFATE 2 PUFF: 108 INHALANT RESPIRATORY (INHALATION) at 12:09

## 2023-09-20 RX ADMIN — LEVETIRACETAM 1000 MG: 100 SOLUTION ORAL at 08:09

## 2023-09-20 RX ADMIN — THERA TABS 1 TABLET: TAB at 08:09

## 2023-09-20 RX ADMIN — RIFAXIMIN 550 MG: 550 TABLET ORAL at 08:09

## 2023-09-20 RX ADMIN — Medication 500 MG: at 08:09

## 2023-09-20 RX ADMIN — MICONAZOLE NITRATE: 20 OINTMENT TOPICAL at 09:09

## 2023-09-20 RX ADMIN — LITHIUM CARBONATE 300 MG: 300 TABLET, FILM COATED, EXTENDED RELEASE ORAL at 08:09

## 2023-09-20 RX ADMIN — PANTOPRAZOLE SODIUM 40 MG: 40 GRANULE, DELAYED RELEASE ORAL at 08:09

## 2023-09-20 RX ADMIN — ALBUTEROL SULFATE 2 PUFF: 108 INHALANT RESPIRATORY (INHALATION) at 07:09

## 2023-09-20 RX ADMIN — SPIRONOLACTONE 100 MG: 100 TABLET ORAL at 08:09

## 2023-09-20 RX ADMIN — FUROSEMIDE 40 MG: 40 TABLET ORAL at 08:09

## 2023-09-20 RX ADMIN — LACTULOSE 30 G: 20 SOLUTION ORAL at 05:09

## 2023-09-20 RX ADMIN — LACTULOSE 30 G: 20 SOLUTION ORAL at 08:09

## 2023-09-20 NOTE — ASSESSMENT & PLAN NOTE
Bcx from 9/12 growing lactobacillus in one of two sets. ID following, suspect contaminant.   Repeat blood cx pending.   D/c vanc. Continue cipro for now.

## 2023-09-20 NOTE — ASSESSMENT & PLAN NOTE
Resume oral lactulose for >3 BMs a day  Encephalopathy improved  Not compliant with medication - change to lactulose 30 g TID  UTI treated  resolved

## 2023-09-20 NOTE — NURSING
Nurses Note -- 4 Eyes      9/20/2023   8:39 AM      Skin assessed during: Q Shift Change      [] No Altered Skin Integrity Present    []Prevention Measures Documented      [x] Yes- Altered Skin Integrity Present or Discovered   [] LDA Added if Not in Epic (Describe Wound)   [] New Altered Skin Integrity was Present on Admit and Documented in LDA   [] Wound Image Taken    Wound Care Consulted? No    Attending Nurse:  Agusto Garsia RN    Second RN/Staff Member:  Evangelina Huerta RN

## 2023-09-20 NOTE — PLAN OF CARE
Message sent to [facility] informing them that patient is expected to be stable for discharge tomorrow morning. This CM submitted to [facility] for insurance authorization. Will continue to follow.    Jyoti Jones RN  Ext [phone]

## 2023-09-20 NOTE — PROGRESS NOTES
Hospital Medicine  Progress note    Team: [facility] [place] [facility] Renee Arciniega MD  Admit Date: 9/16/2023    Principal Problem:  Positive blood culture    Interval hx:  No new events reported. No new complaints    ROS  Respiratory: neg for cough neg for shortness of breath  Cardiovascular: neg for chest pain neg for palpitations  Gastrointestinal: neg for nausea neg for vomiting, neg for abdominal pain neg for diarrhea neg for constipation   Behavioral/Psych: neg for depression neg for anxiety    PEx  Temp:  [97.5 °F (36.4 °C)-98.9 °F (37.2 °C)]   Pulse:  []   Resp:  [14-18]   BP: (105-125)/(53-60)   SpO2:  [92 %-96 %]     Intake/Output Summary (Last 24 hours) at 9/19/2023 2301  Last data filed at 9/19/2023 2012  Gross per 24 hour   Intake --   Output 4200 ml   Net -4200 ml     General Appearance: no acute distress, WD, thin, ill-appearing  Heart: regular rate and rhythm, no heave  Respiratory: Normal respiratory effort, symmetric excursion, bilateral vesicular breath sounds   Abdomen: Soft, non-tender; bowel sounds active  Skin: intact, no rash, no ulcers  Neurologic:  No focal numbness or weakness  Mental status: Alert, oriented x 4, affect appropriate     Recent Labs   Lab 09/14/23  0658 09/15/23  0308 09/17/23  0254   WBC 3.41*  3.41* 6.60  6.60 3.29*   HGB 8.1*  8.1* 8.5*  8.5* 7.2*   HCT 25.0*  25.0* 26.0*  26.0* 23.2*   PLT 69*  69* 80*  80* 52*     Recent Labs   Lab 09/15/23  0308 09/17/23  0254 09/17/23  1344    139 138   K 3.1* 3.0* 3.1*   * 114* 113*   CO2 14* 19* 17*   BUN 8 6 6   CREATININE 0.6 0.5 0.5   * 119* 91   CALCIUM 7.6* 7.3* 7.6*   MG  --  1.6  --    PHOS  --  2.2*  --      Recent Labs   Lab 09/14/23  0658 09/15/23  0308 09/17/23  0254   ALKPHOS 141* 187* 131   ALT 13 16 17   AST 35 41* 36   ALBUMIN 1.7* 1.8* 1.4*   PROT 5.4* 5.6* 4.2*   BILITOT 3.0* 2.8* 2.4*   INR 1.8* 1.8* 2.0*        Recent Labs   Lab 09/13/23  1227 09/13/23  2051 09/14/23  1222 09/14/23  1647  09/14/23  2141 09/18/23  1202   POCTGLUCOSE 122* 203* 165* 183* 190* 304*       Scheduled Meds:   albuterol  2 puff Inhalation Q6H    ascorbic acid (vitamin C)  500 mg Oral BID    furosemide  40 mg Oral Daily    lactulose  30 g Oral Q4H While awake    levetiracetam  1,000 mg Oral BID    lithium  300 mg Oral QHS    miconazole nitrate 2%   Topical (Top) BID    multivitamin  1 tablet Oral Daily    pantoprazole  40 mg Oral BID    rifAXIMin  550 mg Oral BID    spironolactone  100 mg Oral Daily     Continuous Infusions:  As Needed:  acetaminophen, dextromethorphan-guaiFENesin  mg/5 ml, melatonin, naloxone, OLANZapine, ondansetron, prochlorperazine, sodium chloride 0.9%    Assessment and Plan  / Problems managed today    * Positive blood culture  Bcx from 9/12 growing lactobacillus in one of two sets. ID following, suspect contaminant.   Repeat blood cx pending.   D/c vanc. Continue cipro for now.    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       Pneumonia due to COVID-19 virus  -Patient recently admitted and had received remdesivir and dexamethasone while inpatient.   -currently patient is on room air w/o O2 requirement.    -Repeat CXR does not show new or worsening airspace disease      Infection Control notification  and isolation- respiratory, contact and droplet per protocol    Diagnostics: CBC, CMP, CRP, Blood Culture x2 and Portable CXR    Management: Inhaled bronchodilators as needed for shortness of breath.    Advance Care Planning  Current advance care plan has not been discussed with patient/family/POA and patient currently wishes Full Code.  (as per prior admission, patient can't participate tonight)     Acute cystitis  She was diagnosed with  cystitis at last admit on 9/12.   Her urine culture had Multi-drug resistant E.Coli,  Patient received 4 doses of ceftriaxone.   -E.coli was RESISTANT to Ceftriaxone     -Based on Culture/Sensitivity - Give 3 days of Ciprofloxacin.       Urinary retention  Continue chronic baugh catheter    Bipolar 1 disorder, depressed, severe  Resume lithium when appropriate.       Chronic liver failure  Whe patient is awake and alert - restart Lactulose TID     She has significant peripheral edema.  - Home medications have Furosemide 20mg    -Recommend starting patient on Furosemide 40mg and Spironolactone 100mg or   Furosemide 20m/Spironolactone 50mg as oral diuretisc for management of peripheral edema/volume overload in cirrhosis.   She can't take PO safely now - start when she can.      History of seizure  Restart home keppra - will order as IV for now - switch to Po when appropriate.       Discharge Planning   BRAYAN: 9/20/2023     Code Status: Full Code   Is the patient medically ready for discharge?: No    Reason for patient still in hospital (select all that apply): Patient trending condition and Treatment  Discharge Plan A: Skilled Nursing Facility        Diet:  low sodium  GI PPx: protonix  DVT PPx:  scd  Airways: room air  Wounds: moisture associated dermatitis of sacrum    Goals of Care:  Return to prior functional status     Time (minutes) spent in care of the patient including review of tests, flow sheets and notes since last visit, face to face contact, placing orders, communicating with consultants if needed, care coordination, and documentation: 35 min.    Renee Arciniega MD

## 2023-09-20 NOTE — NURSING
Nurses Note -- 4 Eyes      9/19/2023   7:20 PM      Skin assessed during: Q Shift Change      [] No Altered Skin Integrity Present    []Prevention Measures Documented      [x] Yes- Altered Skin Integrity Present or Discovered   [] LDA Added if Not in Epic (Describe Wound)   [] New Altered Skin Integrity was Present on Admit and Documented in LDA   [] Wound Image Taken    Wound Care Consulted? No    Attending Nurse:  Evangelina Rangel RN/Staff Member:  Karin CALVERT

## 2023-09-20 NOTE — PLAN OF CARE
Problem: Adult Inpatient Plan of Care  Goal: Plan of Care Review  Outcome: Ongoing, Progressing  Goal: Patient-Specific Goal (Individualized)  Outcome: Ongoing, Progressing  Goal: Absence of Hospital-Acquired Illness or Injury  Outcome: Ongoing, Progressing  Goal: Optimal Comfort and Wellbeing  Outcome: Ongoing, Progressing  Goal: Readiness for Transition of Care  Outcome: Ongoing, Progressing     Problem: Infection  Goal: Absence of Infection Signs and Symptoms  Outcome: Ongoing, Progressing     Problem: Impaired Wound Healing  Goal: Optimal Wound Healing  Outcome: Ongoing, Progressing     Problem: Adjustment to Illness (Sepsis/Septic Shock)  Goal: Optimal Coping  Outcome: Ongoing, Progressing     Problem: Bleeding (Sepsis/Septic Shock)  Goal: Absence of Bleeding  Outcome: Ongoing, Progressing     Problem: Glycemic Control Impaired (Sepsis/Septic Shock)  Goal: Blood Glucose Level Within Desired Range  Outcome: Ongoing, Progressing     Problem: Infection Progression (Sepsis/Septic Shock)  Goal: Absence of Infection Signs and Symptoms  Outcome: Ongoing, Progressing     Problem: Nutrition Impaired (Sepsis/Septic Shock)  Goal: Optimal Nutrition Intake  Outcome: Ongoing, Progressing     Problem: Fluid Imbalance (Pneumonia)  Goal: Fluid Balance  Outcome: Ongoing, Progressing     Problem: Infection (Pneumonia)  Goal: Resolution of Infection Signs and Symptoms  Outcome: Ongoing, Progressing     Problem: Respiratory Compromise (Pneumonia)  Goal: Effective Oxygenation and Ventilation  Outcome: Ongoing, Progressing     Problem: Skin Injury Risk Increased  Goal: Skin Health and Integrity  Outcome: Ongoing, Progressing     Pt AAOx4. No complaints of pain or discomfort. Routine meds administered. Safety measures maintained. Isolation precautions maintained. Bed alarm set. Call light within reach. Monitoring ongoing.

## 2023-09-21 VITALS
TEMPERATURE: 100 F | SYSTOLIC BLOOD PRESSURE: 127 MMHG | DIASTOLIC BLOOD PRESSURE: 58 MMHG | RESPIRATION RATE: 16 BRPM | HEART RATE: 99 BPM | WEIGHT: 126.56 LBS | OXYGEN SATURATION: 97 % | BODY MASS INDEX: 23.15 KG/M2

## 2023-09-21 LAB
BACTERIA BLD CULT: NORMAL
BACTERIA BLD CULT: NORMAL

## 2023-09-21 PROCEDURE — 94640 AIRWAY INHALATION TREATMENT: CPT

## 2023-09-21 PROCEDURE — 94761 N-INVAS EAR/PLS OXIMETRY MLT: CPT

## 2023-09-21 PROCEDURE — 97110 THERAPEUTIC EXERCISES: CPT

## 2023-09-21 PROCEDURE — 1111F DSCHRG MED/CURRENT MED MERGE: CPT | Mod: CPTII,,, | Performed by: HOSPITALIST

## 2023-09-21 PROCEDURE — 25000003 PHARM REV CODE 250: Performed by: INTERNAL MEDICINE

## 2023-09-21 PROCEDURE — 1111F PR DISCHARGE MEDS RECONCILED W/ CURRENT OUTPATIENT MED LIST: ICD-10-PCS | Mod: CPTII,,, | Performed by: HOSPITALIST

## 2023-09-21 PROCEDURE — 99239 PR HOSPITAL DISCHARGE DAY,>30 MIN: ICD-10-PCS | Mod: ,,, | Performed by: HOSPITALIST

## 2023-09-21 PROCEDURE — 25000003 PHARM REV CODE 250: Performed by: HOSPITALIST

## 2023-09-21 PROCEDURE — 99239 HOSP IP/OBS DSCHRG MGMT >30: CPT | Mod: ,,, | Performed by: HOSPITALIST

## 2023-09-21 PROCEDURE — 25000003 PHARM REV CODE 250: Performed by: STUDENT IN AN ORGANIZED HEALTH CARE EDUCATION/TRAINING PROGRAM

## 2023-09-21 RX ORDER — SPIRONOLACTONE 100 MG/1
100 TABLET, FILM COATED ORAL DAILY
Qty: 30 TABLET | Refills: 11 | Status: ON HOLD | OUTPATIENT
Start: 2023-09-21 | End: 2023-12-08 | Stop reason: HOSPADM

## 2023-09-21 RX ORDER — LITHIUM CARBONATE 300 MG/1
300 TABLET, FILM COATED, EXTENDED RELEASE ORAL NIGHTLY
Qty: 30 TABLET | Refills: 11 | Status: ON HOLD | OUTPATIENT
Start: 2023-09-21 | End: 2023-10-18 | Stop reason: HOSPADM

## 2023-09-21 RX ORDER — OLANZAPINE 5 MG/1
5 TABLET ORAL NIGHTLY
Qty: 30 TABLET | Refills: 11 | Status: ON HOLD | OUTPATIENT
Start: 2023-09-21 | End: 2023-12-08 | Stop reason: SDUPTHER

## 2023-09-21 RX ADMIN — ALBUTEROL SULFATE 2 PUFF: 108 INHALANT RESPIRATORY (INHALATION) at 09:09

## 2023-09-21 RX ADMIN — Medication 500 MG: at 09:09

## 2023-09-21 RX ADMIN — SPIRONOLACTONE 100 MG: 100 TABLET ORAL at 09:09

## 2023-09-21 RX ADMIN — PANTOPRAZOLE SODIUM 40 MG: 40 GRANULE, DELAYED RELEASE ORAL at 09:09

## 2023-09-21 RX ADMIN — LEVETIRACETAM 1000 MG: 100 SOLUTION ORAL at 09:09

## 2023-09-21 RX ADMIN — THERA TABS 1 TABLET: TAB at 09:09

## 2023-09-21 RX ADMIN — LACTULOSE 30 G: 20 SOLUTION ORAL at 09:09

## 2023-09-21 RX ADMIN — FUROSEMIDE 40 MG: 40 TABLET ORAL at 09:09

## 2023-09-21 RX ADMIN — RIFAXIMIN 550 MG: 550 TABLET ORAL at 09:09

## 2023-09-21 RX ADMIN — MICONAZOLE NITRATE: 20 OINTMENT TOPICAL at 11:09

## 2023-09-21 RX ADMIN — ALBUTEROL SULFATE 2 PUFF: 108 INHALANT RESPIRATORY (INHALATION) at 01:09

## 2023-09-21 NOTE — PLAN OF CARE
Insurance authorization obtained from [facility] for SNF services[facility]    SNF orders and prescriptions sent to [facility] via [facility]. Dr. Ortiz and patient's nurse notified of above. Will continue to follow.    10:25AM  Call received from Ambreen at [facility] informing this CM that [facility] has contacted them with the insurance authorization and they are ready for the patient. Ambulance transportation arranged for an estimated pick-up time of 12:00PM. She will be going to [id number]. The nurse can call report to ([phone]).    11:50AM  Call placed to patient's sister Zaria ([phone]) to inform of above conversation. Zaria expressed understanding.    Jyoti Jones RN  Ext [phone]

## 2023-09-21 NOTE — PLAN OF CARE
Problem: Adult Inpatient Plan of Care  Goal: Absence of Hospital-Acquired Illness or Injury  Outcome: Ongoing, Progressing  Goal: Optimal Comfort and Wellbeing  Outcome: Ongoing, Progressing     Problem: Infection  Goal: Absence of Infection Signs and Symptoms  Outcome: Ongoing, Progressing     Problem: Impaired Wound Healing  Goal: Optimal Wound Healing  Outcome: Ongoing, Progressing     Problem: Adjustment to Illness (Sepsis/Septic Shock)  Goal: Optimal Coping  Outcome: Ongoing, Progressing     Problem: Infection Progression (Sepsis/Septic Shock)  Goal: Absence of Infection Signs and Symptoms  Outcome: Ongoing, Progressing

## 2023-09-21 NOTE — NURSING
Removed PIV. Patient verbalized understanding of discharge instructions.Ambulance transported patient.

## 2023-09-21 NOTE — ASSESSMENT & PLAN NOTE
She was diagnosed with cystitis at last admit on 9/12.   Her urine culture had Multi-drug resistant E.Coli,  Patient received 4 doses of ceftriaxone.   -E.coli was RESISTANT to Ceftriaxone     -Based on Culture/Sensitivity - Given 3 days of Ciprofloxacin.

## 2023-09-21 NOTE — NURSING
Nurses Note -- 4 Eyes      9/21/2023   8:44 AM      Skin assessed during: Q Shift Change      [] No Altered Skin Integrity Present    []Prevention Measures Documented      [x] Yes- Altered Skin Integrity Present or Discovered   [] LDA Added if Not in Epic (Describe Wound)   [] New Altered Skin Integrity was Present on Admit and Documented in LDA   [x] Wound Image Taken    Wound Care Consulted? Yes    Attending Nurse:  NEHAL Colmenares    Second RN/Staff Member:  NEHAL Good

## 2023-09-21 NOTE — ASSESSMENT & PLAN NOTE
-Patient recently admitted and had received remdesivir and dexamethasone while inpatient.   -currently patient is on room air w/o O2 requirement.    -Repeat CXR does not show new or worsening airspace disease      Infection Control notification  and isolation- respiratory, contact and droplet per protocol    Diagnostics: CBC, CMP, CRP, Blood Culture x2 and Portable CXR    Management: Inhaled bronchodilators as needed for shortness of breath.    Advance Care Planning  Current advance care plan has not been discussed with patient/family/POA and patient currently wishes Full Code.   (as per prior admission, patient can't participate tonight)     Can stop isolation as she is 5 days from positive test

## 2023-09-21 NOTE — PT/OT/SLP PROGRESS
Occupational Therapy   Treatment    Name: Marely Hamilton  MRN: [id number]  Admitting Diagnosis:  Hepatic encephalopathy       Recommendations:     Discharge Recommendations: nursing facility, skilled  Discharge Equipment Recommendations:  to be determined by next level of care  Barriers to discharge:  Decreased caregiver support    Assessment:     Marely Hamilton is a 58 y.o. female with a medical diagnosis of Hepatic encephalopathy.  She presents with fair participation and motivation.  Pt fairly confused with multiple disoriented statements during session. Performance deficits affecting function are weakness, impaired endurance, impaired functional mobility, impaired self care skills, impaired balance, impaired cognition, decreased safety awareness, decreased lower extremity function, decreased upper extremity function, pain, edema.     Rehab Prognosis:  Fair; patient would benefit from acute skilled OT services to address these deficits and reach maximum level of function.       Plan:     Patient to be seen 3 x/week to address the above listed problems via self-care/home management, therapeutic activities, therapeutic exercises, neuromuscular re-education, cognitive retraining  Plan of Care Expires: 10/19/23  Plan of Care Reviewed with: patient    Subjective     Chief Complaint: buttock pain  Patient/Family Comments/goals: Pt reported that she was keeping track of the weather because she has a date coming up with a man in [place].  Pain/Comfort:  Pain Rating 1:  (did not rate)  Location 1:  (buttocks)  Pain Addressed 1: Reposition, Distraction, Cessation of Activity  Pain Rating Post-Intervention 1:  (did not rate)    Objective:     Communicated with: RN prior to session.  Patient found supine with telemetry, baugh catheter, bed alarm (no family present) upon OT entry to room.    General Precautions: Standard, fall, droplet, contact, airborne    Orthopedic Precautions:N/A  Braces: N/A  Respiratory Status: Room  air     Occupational Performance:     Bed Mobility:    Patient completed Scooting/Bridging with maximal assistance scooting forward on EOB; dependent drawsheet transfer up HOB while supine  Patient completed Supine to Sit with maximal assistance  Patient completed Sit to Supine with moderate assistance     [facility] 6 Click ADL: 13    Treatment & Education:  Pt performed AROM exercises with BUE in 2 planes x 10 reps each while seated EOB then laid self back in bed spontaneously.  Pt required SBA-CGA for postural control while seated EOB.  Provided verbal & physical cues to facilitate postural control. Pt with several statements during session demonstrating confusion/disorientation therefore provided redirections as needed.  Pt had no further questions & when asked whether there were any concerns pt reported none.        Patient left supine with all lines intact, call button in reach, bed alarm on, RN notified, and RN present    GOALS:   Multidisciplinary Problems       Occupational Therapy Goals          Problem: Occupational Therapy    Goal Priority Disciplines Outcome Interventions   Occupational Therapy Goal     OT, PT/OT Ongoing, Progressing    Description: Goals to be met by: 10/19     Patient will increase functional independence with ADLs by performing:    UE Dressing with Stand-by Assistance.  LE Dressing with Moderate Assistance.  Grooming while seated with Set-up Assistance.  Toileting from bedside commode with Moderate Assistance for hygiene and clothing management.   Sitting at edge of bed x15 minutes with Supervision.  Rolling to Bilateral with Supervision.   Supine to sit with SBA.  Stand pivot transfers with Moderate Assistance.                         Time Tracking:     OT Date of Treatment: 09/21/23  OT Start Time: 1202  OT Stop Time: 1218  OT Total Time (min): 16 min    Billable Minutes:Therapeutic Exercise 16    OT/DC: OT          9/21/2023

## 2023-09-21 NOTE — PROGRESS NOTES
Hospital Medicine  Progress note    [place] [facility] [place] [facility] Renee Arcin[place] MD  Admit Date: 9/16/2023    Principal Problem:  Positive blood culture    Interval hx:  No new events reported. No new complaints    ROS  Respiratory: neg for cough neg for shortness of breath  Cardiovascular: neg for chest pain neg for palpitations  Gastrointestinal: neg for nausea neg for vomiting, neg for abdominal pain neg for diarrhea neg for constipation   Behavioral/Psych: neg for depression neg for anxiety    PEx  Temp:  [98.1 °F (36.7 °C)-99.5 °F (37.5 °C)]   Pulse:  []   Resp:  [17-18]   BP: (115-141)/(56-65)   SpO2:  [93 %-97 %]     Intake/Output Summary (Last 24 hours) at 9/20/2023 1922  Last data filed at 9/20/2023 1857  Gross per 24 hour   Intake --   Output 5625 ml   Net -5625 ml       General Appearance: no acute distress, WD, thin, ill-appearing  Heart: regular rate and rhythm, no heave  Respiratory: Normal respiratory effort, symmetric excursion, bilateral vesicular breath sounds   Abdomen: Soft, non-tender; bowel sounds active  Skin: intact, no rash, no ulcers  Neurologic:  No focal numbness or weakness  Mental status: Alert, oriented x 4, affect appropriate     Recent Labs   Lab 09/14/23  0658 09/15/23  0308 09/17/23  0254   WBC 3.41*  3.41* 6.60  6.60 3.29*   HGB 8.1*  8.1* 8.5*  8.5* 7.2*   HCT 25.0*  25.0* 26.0*  26.0* 23.2*   PLT 69*  69* 80*  80* 52*       Recent Labs   Lab 09/15/23  0308 09/17/23  0254 09/17/23  1344    139 138   K 3.1* 3.0* 3.1*   * 114* 113*   CO2 14* 19* 17*   BUN 8 6 6   CREATININE 0.6 0.5 0.5   * 119* 91   CALCIUM 7.6* 7.3* 7.6*   MG  --  1.6  --    PHOS  --  2.2*  --        Recent Labs   Lab 09/14/23  0658 09/15/23  0308 09/17/23  0254   ALKPHOS 141* 187* 131   ALT 13 16 17   AST 35 41* 36   ALBUMIN 1.7* 1.8* 1.4*   PROT 5.4* 5.6* 4.2*   BILITOT 3.0* 2.8* 2.4*   INR 1.8* 1.8* 2.0*          Recent Labs   Lab 09/13/23 2051 09/14/23  1222 09/14/23  1647  09/14/23  2141 09/18/23  1202   POCTGLUCOSE 203* 165* 183* 190* 304*         Scheduled Meds:   albuterol  2 puff Inhalation Q6H    ascorbic acid (vitamin C)  500 mg Oral BID    furosemide  40 mg Oral Daily    lactulose  30 g Oral Q4H While awake    levetiracetam  1,000 mg Oral BID    lithium  300 mg Oral QHS    miconazole nitrate 2%   Topical (Top) BID    multivitamin  1 tablet Oral Daily    pantoprazole  40 mg Oral BID    rifAXIMin  550 mg Oral BID    spironolactone  100 mg Oral Daily     Continuous Infusions:  As Needed:  acetaminophen, dextromethorphan-guaiFENesin  mg/5 ml, melatonin, naloxone, OLANZapine, ondansetron, prochlorperazine, sodium chloride 0.9%    Assessment and Plan  / Problems managed today    * Hepatic encephalopathy  Resume oral lactulose for >3 BMs a day  Encephalopathy improved  Not compliant with medication - change to lactulose 30 g TID  UTI treated  resolved    Positive blood culture  Bcx from 9/12 growing lactobacillus in one of two sets. ID following, suspect contaminant.   Repeat blood cx pending.   D/c vanc. Continue cipro for now.    Acute cystitis  She was diagnosed with cystitis at last admit on 9/12.   Her urine culture had Multi-drug resistant E.Coli,  Patient received 4 doses of ceftriaxone.   -E.coli was RESISTANT to Ceftriaxone     -Based on Culture/Sensitivity - Given 3 days of Ciprofloxacin.     Pneumonia due to COVID-19 virus  -Patient recently admitted and had received remdesivir and dexamethasone while inpatient.   -currently patient is on room air w/o O2 requirement.    -Repeat CXR does not show new or worsening airspace disease      Infection Control notification  and isolation- respiratory, contact and droplet per protocol    Diagnostics: CBC, CMP, CRP, Blood Culture x2 and Portable CXR    Management: Inhaled bronchodilators as needed for shortness of breath.    Advance Care Planning  Current advance care plan has not been discussed with patient/family/POA and  patient currently wishes Full Code.  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       Urinary retention  Continue chronic baugh catheter    Bipolar 1 disorder, depressed, severe  Resume lithium when appropriate.       Chronic liver failure  Whe patient is awake and alert - restart Lactulose TID     She has significant peripheral edema.  - Home medications have Furosemide 20mg    -Recommend starting patient on Furosemide 40mg and Spironolactone 100mg or   Furosemide 20m/Spironolactone 50mg as oral diuretisc for management of peripheral edema/volume overload in cirrhosis.   She can't take PO safely now - start when she can.      History of seizure  Restart home keppra - will order as IV for now - switch to Po when appropriate.       Discharge Planning   BRAYAN: 9/21/2023     Code Status: Full Code   Is the patient medically ready for discharge?: No    Reason for patient still in hospital (select all that apply): Patient trending condition and Treatment  Discharge Plan A: Skilled Nursing Facility        Diet:  low sodium  GI PPx: protonix  DVT PPx:  scd  Airways: room air  Wounds: moisture associated dermatitis of sacrum    Goals of Care:  Return to prior functional status     Time (minutes) spent in care of the patient including review of tests, flow sheets and notes since last visit, face to face contact, placing orders, communicating with consultants if needed, care coordination, and documentation: 35 min.    Renee Arciniega MD

## 2023-09-21 NOTE — NURSING
Nurses Note -- 4 Eyes      9/21/2023   2:35 AM      Skin assessed during: Q Shift Change      [x] No Altered Skin Integrity Present    []Prevention Measures Documented      [] Yes- Altered Skin Integrity Present or Discovered   [] LDA Added if Not in Epic (Describe Wound)   [] New Altered Skin Integrity was Present on Admit and Documented in LDA   [] Wound Image Taken    Wound Care Consulted? No    Attending Nurse:  Florentino Rangel RN/Staff Member: NEHAL Liz

## 2023-09-21 NOTE — PLAN OF CARE
[facility] - Telemetry Stepdown  Discharge Final Note    Primary Care Provider: Viktor Ross MD    Expected Discharge Date: 9/21/2023      Future Appointments   Date Time Provider Department Center   10/10/2023  3:30 PM [facility]          Final Discharge Note (most recent)       Final Note - 09/21/23 1126          Final Note    Assessment Type Final Discharge Note     Anticipated Discharge Disposition Skilled Nursing Facility     What phone number can be called within the next 1-3 days to see how you are doing after discharge? [phone]     Hospital Resources/Appts/Education Provided Appointments scheduled and added to AVS        Post-Acute Status    Post-Acute Authorization Placement     Post-Acute Placement Status Set-up Complete/Auth obtained   [facility]    Discharge Delays None known at this time                     Important Message from Medicare  Important Message from Medicare regarding Discharge Appeal Rights: Given to patient/caregiver, Explained to patient/caregiver, Signed/date by patient/caregiver     Date IMM was signed: 09/21/23  Time IMM was signed: 0859    Contact Info       Viktor Ross MD   Specialty: Family Medicine   Relationship: PCP - General    [address] [place] [address] [address]  [facility] [address]   Phone: [phone]       Next Steps: Schedule an appointment as soon as possible for a visit    [address] [address]   Phone: [phone]       Next Steps: Follow up            Jyoti Jones RN  Ext [phone]

## 2023-09-21 NOTE — PLAN OF CARE
Problem: Occupational Therapy  Goal: Occupational Therapy Goal  Description: Goals to be met by: 10/19     Patient will increase functional independence with ADLs by performing:    UE Dressing with Stand-by Assistance.  LE Dressing with Moderate Assistance.  Grooming while seated with Set-up Assistance.  Toileting from bedside commode with Moderate Assistance for hygiene and clothing management.   Sitting at edge of bed x15 minutes with Supervision.  Rolling to Bilateral with Supervision.   Supine to sit with SBA.  Stand pivot transfers with Moderate Assistance.    Outcome: Ongoing, Progressing     Goals remain appropriate

## 2023-09-21 NOTE — PLAN OF CARE
"   [facility]     Department of Hospital Medicine     [facility] [place] [address]     [phone] after hours  [phone] fax       NURSING HOME ORDERS    Patient Name: Marely Hamilton  YOB: [birth year]    Admit to Nursing Home:  SNF       Full code       Diagnoses:  Active Hospital Problems    Diagnosis  POA    *Hepatic encephalopathy [K76.82]  Yes    Positive blood culture [R78.81]  Yes    Pneumonia due to COVID-19 virus [U07.1, J12.82]  Yes    Acute cystitis [N30.00]  Yes    Urinary retention [R33.9]  Yes    Deep vein thrombosis (DVT) of femoral vein of right lower extremity [I82.411]  Yes    Bipolar 1 disorder, depressed, severe [F31.4]  Yes    Chronic liver failure [K72.11]  Yes    History of seizure [Z87.898]  Yes     One seizure 2013- "low blood sugar from a starvation diet"        Resolved Hospital Problems   No resolved problems to display.       Patient is homebound due to:  Hepatic encephalopathy    Allergies:  Review of patient's allergies indicates:   Allergen Reactions    Sulfa (sulfonamide antibiotics) Rash    Codeine Nausea And Vomiting       Vitals:      Routine, once monthly         Diet: 2 gram sodium diet                  Acitivities:  - Up in a chair each morning as tolerated  - Ambulate with assistance to bathroom  - Scheduled walks once each shift (every 8 hours)  - May ambulate independently  - May use walker, cane, or self-propelled wheelchair       - Weight bearing: as tolerated    LABS:  Per facility protocol    Nursing Precautions:       - Aspiration precautions:             - Total assistance with meals            -  Upright 90 degrees befor during and after meals             -  Suction at bedside          - Fall precautions per nursing home protocol   - Seizure precaution per skilled nursing protocol   - Decubitus precautions:        -  for positioning   - Pressure reducing foam mattress   - Turn patient " every two hours. Use wedge pillows to anchor patient    CONSULTS:        Physical Therapy to evaluate and treat     Occupational Therapy to evaluate and treat     Speech Therapy  to evaluate and treat     Nutrition to evaluate and recommend diet     Psychiatry to evaluate and follow patients for delirium      evaluate and follow patient    MISCELLANEOUS CARE:   Saldaña Care: Empty Saldaña bag every shift.  Change Saldaña every month     Routine Skin for Bedridden Patients:  Apply moisture barrier cream to all    skin folds and wet areas in perineal area daily and after baths and                           all bowel movements.    Medications: Discontinue all previous medication orders, if any. See new list below.       Wound care  Sacrum, buttocks and perineum: bedside nursing to cleanse with soap and water, pat dry and apply antifungal (miconazole) ointment BID and PRN; no dressings nor briefs            Follow Up Appointments:  Future Appointments   Date Time Provider Department Center   10/10/2023  3:30 PM Analisa Alvarado MD [facility]       Allergies:  Review of patient's allergies indicates:   Allergen Reactions    Sulfa (sulfonamide antibiotics) Rash    Codeine Nausea And Vomiting       Medications: Review discharge medications with patient and family and provide education.      Current Discharge Medication List        START taking these medications    Details   miconazole nitrate 2% (MICOTIN) 2 % Oint Apply topically 2 (two) times daily.  Refills: 0      spironolactone (ALDACTONE) 100 MG tablet Take 1 tablet (100 mg total) by mouth once daily.  Qty: 30 tablet, Refills: 11           CONTINUE these medications which have NOT CHANGED    Details   ALPRAZolam (XANAX) 0.25 MG tablet Take 1 tablet (0.25 mg total) by mouth 2 (two) times daily as needed for Anxiety.      ferrous sulfate (FEOSOL) 325 mg (65 mg iron) Tab tablet Take 325 mg by mouth once daily.      furosemide (LASIX) 20 MG tablet Take 20  mg by mouth once daily.      lactulose (CHRONULAC) 20 gram/30 mL Soln Take 45 mLs (30 g total) by mouth 3 (three) times daily. TITRATE TO 3-4 BOWEL MOVEMENTS DAILY.  Qty: 4050 mL, Refills: 2      levetiracetam 500 mg/5 mL (5 mL) Soln Take 10 mLs (1,000 mg total) by mouth 2 (two) times daily.  Qty: 600 mL, Refills: 11      lithium (LITHOBID) 300 MG CR tablet Take 1 tablet (300 mg total) by mouth every evening.  Qty: 30 tablet, Refills: 11      OLANZapine (ZYPREXA) 5 MG tablet Take 1 tablet (5 mg total) by mouth every evening.  Qty: 30 tablet, Refills: 11      pantoprazole (PROTONIX) 40 mg suspension Take 1 packet (40 mg total) by mouth 2 (two) times daily.  Qty: 60 packet, Refills: 11      rifAXIMin (XIFAXAN) 550 mg Tab Take 1 tablet (550 mg total) by mouth 2 (two) times daily.  Qty: 180 tablet, Refills: 3                  _________________________________  Lenka Ortiz MD  09/21/2023

## 2023-09-28 NOTE — DISCHARGE SUMMARY
"DISCHARGE SUMMARY  Hospital Medicine    Team: [facility]    Patient Name: Marely Hamilton  YOB: 1965    Admit Date: 9/16/2023    Discharge Date: 9/21/2023    Discharge Attending Physician: Lenka Ortiz    Admitting Resident    Diagnoses:  Active Hospital Problems    Diagnosis  POA    *Hepatic encephalopathy [K76.82]  Yes    Positive blood culture [R78.81]  Yes    Pneumonia due to COVID-19 virus [U07.1, J12.82]  Yes    Acute cystitis [N30.00]  Yes    Urinary retention [R33.9]  Yes    Deep vein thrombosis (DVT) of femoral vein of right lower extremity [I82.411]  Yes    Bipolar 1 disorder, depressed, severe [F31.4]  Yes    Chronic liver failure [K72.11]  Yes    History of seizure [Z87.898]  Yes     One seizure 2013- "low blood sugar from a starvation diet"        Resolved Hospital Problems   No resolved problems to display.       Discharged Condition: admit problems have stabilized       HOSPITAL COURSE:      Initial Presentation:    58-year-old female, with history of alcoholic cirrhosis, hepatic encephalopathy, recurrent GI bleed, chronic DVT status post IVC filter, sent to the ED from nursing home for positive blood culture.  Patient was recently discharged from the hospital on August 15, for COVID-19 pneumonia.  Her blood culture returned positive today with Gram-positive, shortness in back to the hospital for further management.  Patient complain of diarrhea no vomiting, abdominal pain, shortness of breath or fever. Patient has an indwelling catheter, but she doesn't why she needed or when was the last time it was changed.     Patient cannot provide history she is sleeping and difficult to wake secondary to receipt of chemical restraint in ED, she required chemical restraint - benadryl, haldol, ativan IM injections in order for nursing staff to place IV     She has received 1 dose of Zosyn.  Vancomycin ordered-pending administration     1 of 4 blood cultures from 9/14 - Right arm grew Gram " Positive Leonel       Course of Principle Problem for Admission:    Hepatic encephalopathy  Resume oral lactulose for >3 BMs a day  Encephalopathy improved  Not compliant with medication - change to lactulose 30 g TID  UTI treated    Acute cystitis  She was diagnosed with cystitis at last admit on 9/12.   Her urine culture had Multi-drug resistant E.Coli,  Patient received 4 doses of ceftriaxone.   -E.coli was RESISTANT to Ceftriaxone      -Based on Culture/Sensitivity - Given 3 days of Ciprofloxacin    Positive blood culture  Bcx from 9/12 growing lactobacillus in one of two sets. ID following, suspect contaminant.   Other Medical Problems Addressed in the Hospital:  Pneumonia due to COVID-19 virus  -Patient recently admitted and had received remdesivir and dexamethasone while inpatient.   -currently patient is on room air w/o O2 requirement.       CONSULTS: none    Last CBC/BMP/HgbA1c (if applicable):  Recent Results (from the past 336 hour(s))   CBC with Automated Differential    Collection Time: 09/15/23  3:08 AM   Result Value Ref Range    WBC 6.60 3.90 - 12.70 K/uL    Hemoglobin 8.5 (L) 12.0 - 16.0 g/dL    Hematocrit 26.0 (L) 37.0 - 48.5 %    Platelets 80 (L) 150 - 450 K/uL   CBC Auto Differential    Collection Time: 09/15/23  3:08 AM   Result Value Ref Range    WBC 6.60 3.90 - 12.70 K/uL    Hemoglobin 8.5 (L) 12.0 - 16.0 g/dL    Hematocrit 26.0 (L) 37.0 - 48.5 %    Platelets 80 (L) 150 - 450 K/uL     Recent Results (from the past 336 hour(s))   Basic metabolic panel    Collection Time: 09/17/23  1:44 PM   Result Value Ref Range    Sodium 138 136 - 145 mmol/L    Potassium 3.1 (L) 3.5 - 5.1 mmol/L    Chloride 113 (H) 95 - 110 mmol/L    CO2 17 (L) 23 - 29 mmol/L    BUN 6 6 - 20 mg/dL    Creatinine 0.5 0.5 - 1.4 mg/dL    Calcium 7.6 (L) 8.7 - 10.5 mg/dL    Anion Gap 8 8 - 16 mmol/L     Lab Results   Component Value Date    HGBA1C <4.0 (A) 05/29/2023       Disposition: SNF      Future Scheduled Appointments:  Future  Appointments   Date Time Provider Department Center   10/10/2023  3:30 PM Analisa Alvarado MD [facility]       Follow-up Plans from This Hospitalization:  Hepatology     Discharge Medication List:        Medication List        START taking these medications      miconazole nitrate 2% 2 % Oint  Commonly known as: MICOTIN  Apply topically 2 (two) times daily.     spironolactone 100 MG tablet  Commonly known as: ALDACTONE  Take 1 tablet (100 mg total) by mouth once daily.            CONTINUE taking these medications      ALPRAZolam 0.25 MG tablet  Commonly known as: XANAX  Take 1 tablet (0.25 mg total) by mouth 2 (two) times daily as needed for Anxiety.     ferrous sulfate 325 mg (65 mg iron) Tab tablet  Commonly known as: FEOSOL     furosemide 20 MG tablet  Commonly known as: LASIX     lactulose 20 gram/30 mL Soln  Commonly known as: CHRONULAC  Take 45 mLs (30 g total) by mouth 3 (three) times daily. TITRATE TO 3-4 BOWEL MOVEMENTS DAILY.     levetiracetam 500 mg/5 mL (5 mL) Soln  Take 10 mLs (1,000 mg total) by mouth 2 (two) times daily.     lithium 300 MG CR tablet  Commonly known as: LITHOBID  Take 1 tablet (300 mg total) by mouth every evening.     OLANZapine 5 MG tablet  Commonly known as: ZyPREXA  Take 1 tablet (5 mg total) by mouth every evening.     pantoprazole 40 mg suspension  Commonly known as: PROTONIX  Take 1 packet (40 mg total) by mouth 2 (two) times daily.     rifAXIMin 550 mg Tab  Commonly known as: XIFAXAN  Take 1 tablet (550 mg total) by mouth 2 (two) times daily.               Where to Get Your Medications        These medications were sent to [facility] - [facility] - [address]  [address], [place] [address]      Phone: [phone]   spironolactone 100 MG tablet       You can get these medications from any pharmacy    Bring a paper prescription for each of these medications  lithium 300 MG CR tablet  OLANZapine 5 MG tablet        Information about where to get these medications is not yet available    Ask your nurse or doctor about these medications  miconazole nitrate 2% 2 % Oint         Patient Instructions:  No discharge procedures on file.    Signing Physician:  Lenka Ortiz MD

## 2023-10-10 ENCOUNTER — HOSPITAL ENCOUNTER (INPATIENT)
Facility: HOSPITAL | Age: 58
LOS: 4 days | Discharge: HOME | DRG: 698 | End: 2023-10-18
Attending: EMERGENCY MEDICINE | Admitting: STUDENT IN AN ORGANIZED HEALTH CARE EDUCATION/TRAINING PROGRAM
Payer: MEDICARE

## 2023-10-10 DIAGNOSIS — R41.82 AMS (ALTERED MENTAL STATUS): ICD-10-CM

## 2023-10-10 DIAGNOSIS — K74.60 HEPATIC CIRRHOSIS, UNSPECIFIED HEPATIC CIRRHOSIS TYPE, UNSPECIFIED WHETHER ASCITES PRESENT: Primary | ICD-10-CM

## 2023-10-10 LAB
ALBUMIN SERPL BCP-MCNC: 2.6 G/DL (ref 3.5–5.2)
ALP SERPL-CCNC: 157 U/L (ref 55–135)
ALT SERPL W/O P-5'-P-CCNC: 30 U/L (ref 10–44)
AMMONIA PLAS-SCNC: 52 UMOL/L (ref 10–50)
ANION GAP SERPL CALC-SCNC: 8 MMOL/L (ref 8–16)
APAP SERPL-MCNC: <3 UG/ML (ref 10–20)
AST SERPL-CCNC: 57 U/L (ref 10–40)
BASOPHILS # BLD AUTO: 0.02 K/UL (ref 0–0.2)
BASOPHILS NFR BLD: 0.6 % (ref 0–1.9)
BILIRUB SERPL-MCNC: 3.9 MG/DL (ref 0.1–1)
BUN SERPL-MCNC: 14 MG/DL (ref 6–20)
CALCIUM SERPL-MCNC: 10.7 MG/DL (ref 8.7–10.5)
CHLORIDE SERPL-SCNC: 108 MMOL/L (ref 95–110)
CO2 SERPL-SCNC: 20 MMOL/L (ref 23–29)
CREAT SERPL-MCNC: 0.5 MG/DL (ref 0.5–1.4)
DIFFERENTIAL METHOD: ABNORMAL
EOSINOPHIL # BLD AUTO: 0.2 K/UL (ref 0–0.5)
EOSINOPHIL NFR BLD: 5.2 % (ref 0–8)
ERYTHROCYTE [DISTWIDTH] IN BLOOD BY AUTOMATED COUNT: 14.6 % (ref 11.5–14.5)
EST. GFR  (NO RACE VARIABLE): >60 ML/MIN/1.73 M^2
ETHANOL SERPL-MCNC: <10 MG/DL
GLUCOSE SERPL-MCNC: 89 MG/DL (ref 70–110)
HCT VFR BLD AUTO: 33.5 % (ref 37–48.5)
HGB BLD-MCNC: 11.4 G/DL (ref 12–16)
IMM GRANULOCYTES # BLD AUTO: 0.01 K/UL (ref 0–0.04)
IMM GRANULOCYTES NFR BLD AUTO: 0.3 % (ref 0–0.5)
LYMPHOCYTES # BLD AUTO: 0.9 K/UL (ref 1–4.8)
LYMPHOCYTES NFR BLD: 26.4 % (ref 18–48)
MCH RBC QN AUTO: 35.5 PG (ref 27–31)
MCHC RBC AUTO-ENTMCNC: 34 G/DL (ref 32–36)
MCV RBC AUTO: 104 FL (ref 82–98)
MONOCYTES # BLD AUTO: 0.4 K/UL (ref 0.3–1)
MONOCYTES NFR BLD: 11.3 % (ref 4–15)
NEUTROPHILS # BLD AUTO: 1.8 K/UL (ref 1.8–7.7)
NEUTROPHILS NFR BLD: 56.2 % (ref 38–73)
NRBC BLD-RTO: 0 /100 WBC
PLATELET # BLD AUTO: 87 K/UL (ref 150–450)
PMV BLD AUTO: 9.1 FL (ref 9.2–12.9)
POTASSIUM SERPL-SCNC: 4.4 MMOL/L (ref 3.5–5.1)
PROT SERPL-MCNC: 6.7 G/DL (ref 6–8.4)
RBC # BLD AUTO: 3.21 M/UL (ref 4–5.4)
SODIUM SERPL-SCNC: 136 MMOL/L (ref 136–145)
TSH SERPL DL<=0.005 MIU/L-ACNC: 0.12 UIU/ML (ref 0.4–4)
WBC # BLD AUTO: 3.26 K/UL (ref 3.9–12.7)

## 2023-10-10 PROCEDURE — 93010 ELECTROCARDIOGRAM REPORT: CPT | Mod: ,,, | Performed by: INTERNAL MEDICINE

## 2023-10-10 PROCEDURE — 96365 THER/PROPH/DIAG IV INF INIT: CPT

## 2023-10-10 PROCEDURE — 93005 ELECTROCARDIOGRAM TRACING: CPT

## 2023-10-10 PROCEDURE — 85025 COMPLETE CBC W/AUTO DIFF WBC: CPT | Performed by: EMERGENCY MEDICINE

## 2023-10-10 PROCEDURE — 84439 ASSAY OF FREE THYROXINE: CPT | Performed by: EMERGENCY MEDICINE

## 2023-10-10 PROCEDURE — 96361 HYDRATE IV INFUSION ADD-ON: CPT

## 2023-10-10 PROCEDURE — 82140 ASSAY OF AMMONIA: CPT | Performed by: EMERGENCY MEDICINE

## 2023-10-10 PROCEDURE — 99285 EMERGENCY DEPT VISIT HI MDM: CPT | Mod: 25

## 2023-10-10 PROCEDURE — 93010 EKG 12-LEAD: ICD-10-PCS | Mod: ,,, | Performed by: INTERNAL MEDICINE

## 2023-10-10 PROCEDURE — 63600175 PHARM REV CODE 636 W HCPCS: Performed by: EMERGENCY MEDICINE

## 2023-10-10 PROCEDURE — 80053 COMPREHEN METABOLIC PANEL: CPT | Performed by: EMERGENCY MEDICINE

## 2023-10-10 PROCEDURE — 84443 ASSAY THYROID STIM HORMONE: CPT | Performed by: EMERGENCY MEDICINE

## 2023-10-10 PROCEDURE — 51702 INSERT TEMP BLADDER CATH: CPT

## 2023-10-10 PROCEDURE — 80143 DRUG ASSAY ACETAMINOPHEN: CPT | Performed by: EMERGENCY MEDICINE

## 2023-10-10 PROCEDURE — 96372 THER/PROPH/DIAG INJ SC/IM: CPT | Performed by: EMERGENCY MEDICINE

## 2023-10-10 PROCEDURE — 80178 ASSAY OF LITHIUM: CPT | Performed by: EMERGENCY MEDICINE

## 2023-10-10 PROCEDURE — 82077 ASSAY SPEC XCP UR&BREATH IA: CPT | Performed by: EMERGENCY MEDICINE

## 2023-10-10 RX ORDER — DROPERIDOL 2.5 MG/ML
5 INJECTION, SOLUTION INTRAMUSCULAR; INTRAVENOUS ONCE
Status: COMPLETED | OUTPATIENT
Start: 2023-10-11 | End: 2023-10-11

## 2023-10-10 RX ORDER — DROPERIDOL 2.5 MG/ML
5 INJECTION, SOLUTION INTRAMUSCULAR; INTRAVENOUS ONCE
Status: DISCONTINUED | OUTPATIENT
Start: 2023-10-11 | End: 2023-10-10

## 2023-10-10 RX ORDER — OLANZAPINE 10 MG/2ML
5 INJECTION, POWDER, FOR SOLUTION INTRAMUSCULAR ONCE AS NEEDED
Status: COMPLETED | OUTPATIENT
Start: 2023-10-10 | End: 2023-10-10

## 2023-10-10 RX ADMIN — OLANZAPINE 5 MG: 10 INJECTION, POWDER, FOR SOLUTION INTRAMUSCULAR at 10:10

## 2023-10-10 NOTE — Clinical Note
Diagnosis: AMS (altered mental status) [1579716]   Future Attending Provider: NICOLAS HUDSON [[id number]]   Is the patient being sent to ED Observation?: No   Admitting Provider:: MORIAH ENAMORADO [[id number]]

## 2023-10-11 PROBLEM — Z97.8 CHRONIC INDWELLING FOLEY CATHETER: Status: ACTIVE | Noted: 2023-10-11

## 2023-10-11 PROBLEM — Z87.440 HISTORY OF RECURRENT UTIS: Status: ACTIVE | Noted: 2023-10-11

## 2023-10-11 PROBLEM — Z95.828 PRESENCE OF IVC FILTER: Status: ACTIVE | Noted: 2023-10-11

## 2023-10-11 PROBLEM — D63.8 ANEMIA OF CHRONIC DISEASE: Status: ACTIVE | Noted: 2023-06-30

## 2023-10-11 LAB
AMPHET+METHAMPHET UR QL: NEGATIVE
BACTERIA #/AREA URNS AUTO: ABNORMAL /HPF
BARBITURATES UR QL SCN>200 NG/ML: NEGATIVE
BENZODIAZ UR QL SCN>200 NG/ML: ABNORMAL
BILIRUB UR QL STRIP: NEGATIVE
BZE UR QL SCN: NEGATIVE
CANNABINOIDS UR QL SCN: NEGATIVE
CLARITY UR REFRACT.AUTO: ABNORMAL
COLOR UR AUTO: YELLOW
CREAT UR-MCNC: 58 MG/DL (ref 15–325)
GLUCOSE UR QL STRIP: NEGATIVE
HGB UR QL STRIP: ABNORMAL
HYALINE CASTS UR QL AUTO: 0 /LPF
INR PPP: 1.6 (ref 0.8–1.2)
KETONES UR QL STRIP: NEGATIVE
LEUKOCYTE ESTERASE UR QL STRIP: ABNORMAL
LITHIUM SERPL-SCNC: 0.1 MMOL/L (ref 0.6–1.2)
METHADONE UR QL SCN>300 NG/ML: NEGATIVE
MICROSCOPIC COMMENT: ABNORMAL
NITRITE UR QL STRIP: NEGATIVE
OPIATES UR QL SCN: NEGATIVE
PCP UR QL SCN>25 NG/ML: NEGATIVE
PH UR STRIP: 6 [PH] (ref 5–8)
PROT UR QL STRIP: ABNORMAL
PROTHROMBIN TIME: 16.8 SEC (ref 9–12.5)
RBC #/AREA URNS AUTO: >100 /HPF (ref 0–4)
SP GR UR STRIP: 1.02 (ref 1–1.03)
SQUAMOUS #/AREA URNS AUTO: 0 /HPF
T4 FREE SERPL-MCNC: 1.12 NG/DL (ref 0.71–1.51)
TOXICOLOGY INFORMATION: ABNORMAL
URN SPEC COLLECT METH UR: ABNORMAL
WBC #/AREA URNS AUTO: >100 /HPF (ref 0–5)
WBC CLUMPS UR QL AUTO: ABNORMAL

## 2023-10-11 PROCEDURE — 87086 URINE CULTURE/COLONY COUNT: CPT | Performed by: EMERGENCY MEDICINE

## 2023-10-11 PROCEDURE — 25000003 PHARM REV CODE 250: Performed by: NURSE PRACTITIONER

## 2023-10-11 PROCEDURE — 63600175 PHARM REV CODE 636 W HCPCS: Performed by: STUDENT IN AN ORGANIZED HEALTH CARE EDUCATION/TRAINING PROGRAM

## 2023-10-11 PROCEDURE — 80307 DRUG TEST PRSMV CHEM ANLYZR: CPT | Performed by: EMERGENCY MEDICINE

## 2023-10-11 PROCEDURE — 99223 PR INITIAL HOSPITAL CARE,LEVL III: ICD-10-PCS | Mod: ,,, | Performed by: NURSE PRACTITIONER

## 2023-10-11 PROCEDURE — 96365 THER/PROPH/DIAG IV INF INIT: CPT

## 2023-10-11 PROCEDURE — G0378 HOSPITAL OBSERVATION PER HR: HCPCS

## 2023-10-11 PROCEDURE — 96361 HYDRATE IV INFUSION ADD-ON: CPT

## 2023-10-11 PROCEDURE — 85610 PROTHROMBIN TIME: CPT | Performed by: NURSE PRACTITIONER

## 2023-10-11 PROCEDURE — 87186 SC STD MICRODIL/AGAR DIL: CPT | Performed by: EMERGENCY MEDICINE

## 2023-10-11 PROCEDURE — 81001 URINALYSIS AUTO W/SCOPE: CPT | Performed by: EMERGENCY MEDICINE

## 2023-10-11 PROCEDURE — 51702 INSERT TEMP BLADDER CATH: CPT

## 2023-10-11 PROCEDURE — 99223 1ST HOSP IP/OBS HIGH 75: CPT | Mod: ,,, | Performed by: NURSE PRACTITIONER

## 2023-10-11 PROCEDURE — 25000003 PHARM REV CODE 250: Performed by: STUDENT IN AN ORGANIZED HEALTH CARE EDUCATION/TRAINING PROGRAM

## 2023-10-11 PROCEDURE — 96372 THER/PROPH/DIAG INJ SC/IM: CPT | Performed by: STUDENT IN AN ORGANIZED HEALTH CARE EDUCATION/TRAINING PROGRAM

## 2023-10-11 PROCEDURE — 87077 CULTURE AEROBIC IDENTIFY: CPT | Performed by: EMERGENCY MEDICINE

## 2023-10-11 PROCEDURE — 87088 URINE BACTERIA CULTURE: CPT | Performed by: EMERGENCY MEDICINE

## 2023-10-11 PROCEDURE — 25500020 PHARM REV CODE 255: Performed by: EMERGENCY MEDICINE

## 2023-10-11 RX ORDER — LEVETIRACETAM 100 MG/ML
1000 SOLUTION ORAL 2 TIMES DAILY
Status: DISCONTINUED | OUTPATIENT
Start: 2023-10-11 | End: 2023-10-18 | Stop reason: HOSPADM

## 2023-10-11 RX ORDER — FUROSEMIDE 20 MG/1
20 TABLET ORAL DAILY
Status: DISCONTINUED | OUTPATIENT
Start: 2023-10-11 | End: 2023-10-16

## 2023-10-11 RX ORDER — OLANZAPINE 2.5 MG/1
5 TABLET ORAL NIGHTLY
Status: DISCONTINUED | OUTPATIENT
Start: 2023-10-11 | End: 2023-10-18 | Stop reason: HOSPADM

## 2023-10-11 RX ORDER — SPIRONOLACTONE 25 MG/1
100 TABLET ORAL DAILY
Status: DISCONTINUED | OUTPATIENT
Start: 2023-10-11 | End: 2023-10-16

## 2023-10-11 RX ORDER — LACTULOSE 10 G/15ML
30 SOLUTION ORAL 3 TIMES DAILY
Status: DISCONTINUED | OUTPATIENT
Start: 2023-10-11 | End: 2023-10-13

## 2023-10-11 RX ORDER — ALPRAZOLAM 0.25 MG/1
0.25 TABLET ORAL 2 TIMES DAILY PRN
Status: DISCONTINUED | OUTPATIENT
Start: 2023-10-11 | End: 2023-10-18 | Stop reason: HOSPADM

## 2023-10-11 RX ORDER — LITHIUM CARBONATE 300 MG/1
300 TABLET, FILM COATED, EXTENDED RELEASE ORAL NIGHTLY
Status: DISCONTINUED | OUTPATIENT
Start: 2023-10-11 | End: 2023-10-17

## 2023-10-11 RX ORDER — ACETAMINOPHEN 325 MG/1
650 TABLET ORAL EVERY 4 HOURS PRN
Status: DISCONTINUED | OUTPATIENT
Start: 2023-10-11 | End: 2023-10-18 | Stop reason: HOSPADM

## 2023-10-11 RX ORDER — SODIUM CHLORIDE 0.9 % (FLUSH) 0.9 %
10 SYRINGE (ML) INJECTION
Status: DISCONTINUED | OUTPATIENT
Start: 2023-10-11 | End: 2023-10-18 | Stop reason: HOSPADM

## 2023-10-11 RX ORDER — LANOLIN ALCOHOL/MO/W.PET/CERES
1 CREAM (GRAM) TOPICAL DAILY
Status: DISCONTINUED | OUTPATIENT
Start: 2023-10-11 | End: 2023-10-18 | Stop reason: HOSPADM

## 2023-10-11 RX ORDER — ONDANSETRON 8 MG/1
8 TABLET, ORALLY DISINTEGRATING ORAL EVERY 8 HOURS PRN
Status: DISCONTINUED | OUTPATIENT
Start: 2023-10-11 | End: 2023-10-18 | Stop reason: HOSPADM

## 2023-10-11 RX ORDER — PANTOPRAZOLE SODIUM 40 MG/1
40 FOR SUSPENSION ORAL 2 TIMES DAILY
Status: DISCONTINUED | OUTPATIENT
Start: 2023-10-11 | End: 2023-10-18 | Stop reason: HOSPADM

## 2023-10-11 RX ORDER — ONDANSETRON 2 MG/ML
4 INJECTION INTRAMUSCULAR; INTRAVENOUS EVERY 8 HOURS PRN
Status: DISCONTINUED | OUTPATIENT
Start: 2023-10-11 | End: 2023-10-18 | Stop reason: HOSPADM

## 2023-10-11 RX ADMIN — LITHIUM CARBONATE 300 MG: 300 TABLET, FILM COATED, EXTENDED RELEASE ORAL at 10:10

## 2023-10-11 RX ADMIN — LACTULOSE 30 G: 20 SOLUTION ORAL at 08:10

## 2023-10-11 RX ADMIN — OLANZAPINE 5 MG: 5 TABLET, FILM COATED ORAL at 08:10

## 2023-10-11 RX ADMIN — SODIUM CHLORIDE, POTASSIUM CHLORIDE, SODIUM LACTATE AND CALCIUM CHLORIDE 1000 ML: 600; 310; 30; 20 INJECTION, SOLUTION INTRAVENOUS at 05:10

## 2023-10-11 RX ADMIN — RIFAXIMIN 550 MG: 550 TABLET ORAL at 08:10

## 2023-10-11 RX ADMIN — CEFTRIAXONE 1 G: 1 INJECTION, POWDER, FOR SOLUTION INTRAMUSCULAR; INTRAVENOUS at 10:10

## 2023-10-11 RX ADMIN — SPIRONOLACTONE 100 MG: 25 TABLET ORAL at 11:10

## 2023-10-11 RX ADMIN — FUROSEMIDE 20 MG: 20 TABLET ORAL at 11:10

## 2023-10-11 RX ADMIN — PANTOPRAZOLE SODIUM 40 MG: 40 GRANULE, DELAYED RELEASE ORAL at 08:10

## 2023-10-11 RX ADMIN — LEVETIRACETAM 1000 MG: 100 SOLUTION ORAL at 11:10

## 2023-10-11 RX ADMIN — LEVETIRACETAM 1000 MG: 100 SOLUTION ORAL at 08:10

## 2023-10-11 RX ADMIN — LACTULOSE 30 G: 20 SOLUTION ORAL at 02:10

## 2023-10-11 RX ADMIN — RIFAXIMIN 550 MG: 550 TABLET ORAL at 11:10

## 2023-10-11 RX ADMIN — IOHEXOL 75 ML: 350 INJECTION, SOLUTION INTRAVENOUS at 07:10

## 2023-10-11 RX ADMIN — DROPERIDOL 5 MG: 2.5 INJECTION, SOLUTION INTRAMUSCULAR; INTRAVENOUS at 12:10

## 2023-10-11 RX ADMIN — FERROUS SULFATE TAB 325 MG (65 MG ELEMENTAL FE) 1 EACH: 325 (65 FE) TAB at 11:10

## 2023-10-11 RX ADMIN — PANTOPRAZOLE SODIUM 40 MG: 40 GRANULE, DELAYED RELEASE ORAL at 11:10

## 2023-10-11 NOTE — ED NOTES
Called CT @ [id number], spoke w/ Delphine who states will be ready in about 30 mins. DVC maintained, sitter present.

## 2023-10-11 NOTE — PLAN OF CARE
[facility] - Emergency Dept  Discharge Assessment    Primary Care Provider: Viktor Ross MD     Pt to return to [facility] on d/c    No further post acute services required    Discharge Assessment (most recent)       BRIEF DISCHARGE ASSESSMENT - 10/11/23 8049          Discharge Planning    Assessment Type Discharge Planning Brief Assessment (P)      Resource/Environmental Concerns none (P)      Support Systems Family members;Other (Comment) (P)    facility staff    Current Living Arrangements extended care facility (P)      Care Facility Name [facility] (P)      Patient/Family Anticipates Transition to long-term care facility (P)      Patient/Family Anticipated Services at Transition none (P)      DME Needed Upon Discharge  none (P)      Discharge Plan A Return to nursing home (P)      Discharge Plan B Return to Nursing Home (P)                      Chelsea Villafana CD, MSW, LMSW, RSW   Case Management  [facility]  Email: [email or web address]
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
## 2023-10-11 NOTE — ASSESSMENT & PLAN NOTE
-chronic  -PEC'd in ED >>> continue  -Psychiatry consult pending   -med and dose adjustments per Psych  -monitor
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
## 2023-10-11 NOTE — SUBJECTIVE & OBJECTIVE
Past Medical History:   Diagnosis Date    Addiction to drug     Alcohol abuse     Alcohol abuse, in remission 6/15/2015    14.5 weeks ago; AA weekly    Anemia     Anxiety 6/15/2015    Behavioral problem     Bipolar disorder     Bipolar disorder in remission 6/15/2015    Cirrhosis, Laennec's 6/15/2015    Depression     Encounter for blood transfusion     Epistaxis 6/15/2015    Fatigue     Glaucoma     Hematuria     Hepatic encephalopathy 6/15/2015    Hepatic enlargement     History of psychiatric care     History of psychiatric hospitalization     History of seizure 6/15/2015    1    hx of intentional Tylenol overdose 6/15/2015    2005- situational and hx of bipolar    Hx of psychiatric care     Macrocytic anemia 9/18/2015    6 units PRBC since June 2015    Jeana     Osteoarthritis     Other ascites 6/15/2015    Psychiatric exam requested by authority     Psychiatric problem     Psychosis 9/26/2019    Renal disorder     Seizures     Self-harming behavior     Suicide attempt     Therapy     Thrombocytopenia 6/15/2015       Past Surgical History:   Procedure Laterality Date    COSMETIC SURGERY      EMBOLIZATION N/A 6/11/2023    Procedure: EMBOLIZATION, BLOOD VESSEL;  Surgeon: Debbie Surgeon;  Location: [facility];  Service: Anesthesiology;  Laterality: N/A;    ESOPHAGOGASTRODUODENOSCOPY      ESOPHAGOGASTRODUODENOSCOPY N/A 7/6/2023    Procedure: EGD (ESOPHAGOGASTRODUODENOSCOPY);  Surgeon: Bernice Guillen MD;  Location: [address]);  Service: Endoscopy;  Laterality: N/A;    ESOPHAGOGASTRODUODENOSCOPY N/A 7/11/2023    Procedure: EGD (ESOPHAGOGASTRODUODENOSCOPY);  Surgeon: Rangel Broussard MD;  Location: [address]);  Service: Endoscopy;  Laterality: N/A;       Review of patient's allergies indicates:   Allergen Reactions    Sulfa (sulfonamide antibiotics) Rash    Codeine Nausea And Vomiting       No current facility-administered medications on file prior to encounter.     Current Outpatient Medications on  File Prior to Encounter   Medication Sig    ALPRAZolam (XANAX) 0.25 MG tablet Take 1 tablet (0.25 mg total) by mouth 2 (two) times daily as needed for Anxiety.    ferrous sulfate (FEOSOL) 325 mg (65 mg iron) Tab tablet Take 325 mg by mouth once daily.    furosemide (LASIX) 20 MG tablet Take 20 mg by mouth once daily.    lactulose (CHRONULAC) 20 gram/30 mL Soln Take 45 mLs (30 g total) by mouth 3 (three) times daily. TITRATE TO 3-4 BOWEL MOVEMENTS DAILY.    levetiracetam 500 mg/5 mL (5 mL) Soln Take 10 mLs (1,000 mg total) by mouth 2 (two) times daily.    lithium (LITHOBID) 300 MG CR tablet Take 1 tablet (300 mg total) by mouth every evening.    miconazole nitrate 2% (MICOTIN) 2 % Oint Apply topically 2 (two) times daily.    OLANZapine (ZYPREXA) 5 MG tablet Take 1 tablet (5 mg total) by mouth every evening.    pantoprazole (PROTONIX) 40 mg suspension Take 1 packet (40 mg total) by mouth 2 (two) times daily.    rifAXIMin (XIFAXAN) 550 mg Tab Take 1 tablet (550 mg total) by mouth 2 (two) times daily.    spironolactone (ALDACTONE) 100 MG tablet Take 1 tablet (100 mg total) by mouth once daily.     Family History       Problem Relation (Age of Onset)    Alcohol abuse Father, Sister, Paternal Grandfather    Bipolar disorder Father    Hypertension Mother    Suicide Father          Tobacco Use    Smoking status: Never    Smokeless tobacco: Never   Substance and Sexual Activity    Alcohol use: Not Currently     Comment: hx of ETOH abuse with cirrhosis of liver    Drug use: No    Sexual activity: Not Currently     Review of Systems   Constitutional:  Negative for activity change, appetite change, chills, diaphoresis, fatigue and fever.   HENT:  Negative for congestion and sore throat.    Respiratory:  Negative for cough, chest tightness, shortness of breath and wheezing.    Cardiovascular:  Negative for chest pain, palpitations and leg swelling.   Gastrointestinal:  Positive for abdominal distention and diarrhea. Negative for  abdominal pain, constipation, nausea and vomiting.   Genitourinary:  Negative for difficulty urinating, dysuria and flank pain.   Musculoskeletal:         +foot drop   Neurological:  Negative for dizziness, syncope, weakness, light-headedness and headaches.       Objective:     Vital Signs (Most Recent):  Temp: 97.1 °F (36.2 °C) (10/11/23 1030)  Pulse: 66 (10/11/23 1030)  Resp: 16 (10/11/23 1030)  BP: (!) 112/58 (10/11/23 1030)  SpO2: 96 % (10/11/23 1030) Vital Signs (24h Range):  Temp:  [97.1 °F (36.2 °C)-98.8 °F (37.1 °C)] 97.1 °F (36.2 °C)  Pulse:  [65-78] 66  Resp:  [16-18] 16  SpO2:  [96 %-98 %] 96 %  BP: (100-113)/(53-69) 112/58     Weight: 57.2 kg (126 lb)  Body mass index is 23.05 kg/m².     Physical Exam  Vitals and nursing note reviewed.   Constitutional:       General: She is not in acute distress.     Appearance: Normal appearance. She is well-developed and normal weight. She is ill-appearing (chronically ill appearing). She is not toxic-appearing.   HENT:      Head: Normocephalic and atraumatic.      Mouth/Throat:      Dentition: Normal dentition.   Eyes:      General: Lids are normal. Scleral icterus present.      Extraocular Movements: Extraocular movements intact.      Conjunctiva/sclera: Conjunctivae normal.   Cardiovascular:      Rate and Rhythm: Normal rate and regular rhythm.   Pulmonary:      Effort: Pulmonary effort is normal.      Breath sounds: Normal breath sounds.   Abdominal:      General: Bowel sounds are normal. There is distension (mild).      Palpations: Abdomen is soft.      Tenderness: There is no abdominal tenderness.   Musculoskeletal:      Cervical back: Neck supple.      Right lower leg: No edema.      Left lower leg: No edema.      Comments: Bilateral foot drop noted.    Skin:     General: Skin is warm and dry.      Coloration: Skin is jaundiced (pale jaundice).   Neurological:      Mental Status: She is alert and oriented to person, place, and time.             Significant  "Labs: All pertinent labs within the past 24 hours have been reviewed.  CBC:   Recent Labs   Lab 10/10/23  2237   WBC 3.26*   HGB 11.4*   HCT 33.5*   PLT 87*     CMP:   Recent Labs   Lab 10/10/23  2237      K 4.4      CO2 20*   GLU 89   BUN 14   CREATININE 0.5   CALCIUM 10.7*   PROT 6.7   ALBUMIN 2.6*   BILITOT 3.9*   ALKPHOS 157*   AST 57*   ALT 30   ANIONGAP 8     Coagulation: No results for input(s): "PT", "INR", "APTT" in the last 48 hours.  TSH:   Recent Labs   Lab 10/10/23  2237   TSH 0.115*     Urine Studies:   Recent Labs   Lab 10/11/23  0250   COLORU Yellow   APPEARANCEUA Hazy*   PHUR 6.0   SPECGRAV 1.020   PROTEINUA 1+*   GLUCUA Negative   KETONESU Negative   BILIRUBINUA Negative   OCCULTUA 3+*   NITRITE Negative   LEUKOCYTESUR 3+*   RBCUA >100*   WBCUA >100*   BACTERIA Moderate*   SQUAMEPITHEL 0   HYALINECASTS 0       Significant Imaging: I have reviewed all pertinent imaging results/findings within the past 24 hours.  "

## 2023-10-11 NOTE — ED NOTES
Nurses Note -- 4 Eyes      10/11/2023   9:11 AM      Skin assessed during: Admit      [x] No Altered Skin Integrity Present    []Prevention Measures Documented      [] Yes- Altered Skin Integrity Present or Discovered   [] LDA Added if Not in Epic (Describe Wound)   [] New Altered Skin Integrity was Present on Admit and Documented in LDA   [] Wound Image Taken    Wound Care Consulted? No    Attending Nurse:  Yung Rangel RN/Staff Member:   Sarah

## 2023-10-11 NOTE — PROVIDER PROGRESS NOTES - EMERGENCY DEPT.
Encounter Date: 10/10/2023    ED Physician Progress Notes            Received patient at signout.    59 y/o F presenting with agitation.  Received multiple doses  of medicine for agitation.  Pending workup completion.    -PEC already in place.  Labs with no leukocytosis  -normal electrolytes, normal creatinine.  Urinalysis notable for definite UTI.  -Added on Rocephin for coverage.  -there is likely some component of hepatic encephalopathy.  CT abdomen without any acute findings.  -Admitted to . Psych consult placed.

## 2023-10-11 NOTE — ED NOTES
Patient is lying supine awake. She is oriented to person, place, year, current events, situation. She states that her  has a $99 million lawsuit against a physician who misdiagnosed him. She states that he remained in the hospital for 2-1/2 yrs.w/ COVID. She frequently cries about the death of her mother May 11th & her dog  of starvation. Her sclera appears mildly yellow/jaundice. She has a moderate sized purple bruise to the (L) side of her neck. Her arms have scattered bruises. She is maintained on DVC for safety, sitter present. The patient accepted a cup of ice & water.

## 2023-10-11 NOTE — ED NOTES
Report received from NEHAL Muniz. Care assumed at this time. Pt is resting comfortably in ED stretcher, and is awake, alert. Respirations are even and unlabored, no distress noted at this time. Pt oriented to room and educated on use of call bell. Fall risk reviewed with patient and understanding verbalized. Explanation of wait time and plan of care update provided to patient and patient's family at bedside. Pt verbalized understanding and all questions and concerns addressed. Pt was offered restroom assistance and denies need to void at this time. Pt remains on continuous cardiac monitor, automated BP cuff cycling Q30 minutes, and continuous pulse oximeter. Pt denies pain at this time and verbalizes no further needs. SR raised x2, bed locked and in lowest position. Pt encouraged to call for assistance using call bell when needed and verbalized understanding.

## 2023-10-11 NOTE — ED NOTES
Pt refusing to change baugh catheter and IV start. Pt starting to get aggressive & and agitated. Discussed with MD Spencer and awaiting medication orders.

## 2023-10-11 NOTE — ED NOTES
Assumed pt care, she is lying on the stretcher resting NAD noted. Pt dressed in paper scrubs, IVF infusing to gravity and indwelling urinary catheter in place. Pt has a signed PEC at the nursing station, EDT outside of the room DVC maintained.

## 2023-10-11 NOTE — ED NOTES
Patient's demeanor has remained pleasant. She engages easily. She is offering no complaints @ present. The bed remains in the lowest locked position w/ both side rails in the upright position for safety. DVC maintained for safety, sitter present.

## 2023-10-11 NOTE — ASSESSMENT & PLAN NOTE
Acute metabolic encephalopathy  Hepatic encephalopathy  History of recurrent UTIs  Urinary retention  Chronic indwelling baugh catheter  -increased agitation and aggression, refusing home medications in setting of UTI and likely chronic hepatic encephalopathy  -at admission HDS and afebrile  -admission labs:   -CBC with WBC 3, H/H 11.4/33.5, platelets 87 (all at or better than baseline). Chemistry with stable renal function, glucose 89, K 4.4, alk phose 157, TBili 3.9, AST 57, ALT 30 (all similar to baseline). Ammonia 52. TSH 0.115/FT4 1.12. PT/INR pending. Lithium level 0.1. Serum acetaminophen <3.0. Serum alcohol <10. UDS + benzos. UA concerning for infection.  -admission micro:   -Urine culture in process >>> please follow   -admission diagnostics:   -CT A/P with cirrhosis with signs of portal hypertension including splenomegaly and portosystemic collaterals; cholelithiasis; decreased 9.8 cm left retroperitoneal collection, which could represent a hematoma or abscess; large colonic stool burden; no bowel obstruction or inflammation.  -PEC'd in ED >>> continue  -Psychiatry consult pending  -initiated on rocephin for UTI >> continue   -tailor/de-escalate as appropriate   -continue home lithium, zyprexa, and PRN alprazolam   -med and dose adjustments per Psych  -continue home lactulose, rifaximin, and spironolactone   -dose/medication adjustment as appropriate  -delirium and fall precautions  -trend labs, address/replete electrolytes as indicated  -PRN supportive care as indicated  -monitor

## 2023-10-11 NOTE — ASSESSMENT & PLAN NOTE
Hepatic encephalopathy  Alcoholic cirrhosis  Alcohol abuse, in remission   Anemia of chronic disease  Thrombocytopenia  -chronic liver disease with LFTs at baseline on admission  -MELD 20  -PEC'd in ED >>> continue  -Psychiatry consult pending  -continue home lithium, zyprexa, and PRN alprazolam   -med and dose adjustments per Psych  -continue home lactulose, rifaximin, and spironolactone   -dose/medication adjustment as appropriate  -trend labs, address/replete electrolytes as indicated  -monitor

## 2023-10-11 NOTE — PROVIDER PROGRESS NOTES - EMERGENCY DEPT.
Encounter Date: 10/10/2023    ED Physician Progress Notes        ED Physician Hand-off Note:    ED Course: I assumed care of patient from off-going ED physician, Dr. Eddy.  Briefly, Patient is presented for aggressive behavior to herself. At the time of signout plan was pending work up for possible AMS, UA.    -refusing meds at facility  -alcoholic cirrhosis  -indwelling baugh catheter, chronic bl LE weakness   -work up for infectious/metabolic encephalopathy  -EKG with NSR, rate 81,   -patient agitated while attempting to exchange baugh to work up for UTI given hx of complicated UTI, indwelling baugh, was given olanzapine 1 hour prior, will proceed with 5 droperidol  -RUQ tenderness on abdominal exam, with increased bili, alk phos, may be obstruction vs chronic cirrhosis, CTAP obtained to work up for infection vs obstruction  -PEC in place for change in behavior at nursing home, agitation, refusal to take psychiatric meds, currently denying SI/HI/AVH, has been calm and cooperative following droperidol     Disposition: medical clearance for psych hospitalization vs. medical admission    Remainder of care signed out to incoming ED team    Final diagnoses:  None
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
## 2023-10-11 NOTE — ED NOTES
The patient drank a cup of iced Orange Juice. She remains free of any complaints. DVC maintained for safety.

## 2023-10-11 NOTE — ED NOTES
Patient identifiers verified and correct for Marely Hamilton  LOC: The patient is awake, alert and aware of environment with an appropriate affect, the patient is oriented x 3 and speaking appropriately.   APPEARANCE: Patient appears comfortable and in no acute distress, patient is clean and well groomed.  SKIN: The skin is warm and dry, color consistent with ethnicity, patient has normal skin turgor and moist mucus membranes, skin intact, no breakdown or bruising noted.   MUSCULOSKELETAL: Patient moving all extremities spontaneously, no swelling noted. Foot drop noted. Patient reports bilateral foot pain  RESPIRATORY: Airway is open and patent, respirations are spontaneous, patient has a normal effort and rate, no accessory muscle use noted, O2 sats noted at 96% on room air.  CARDIAC: Denies chest pain  GASTRO: Soft and non tender to palpation, no distention noted, normoactive bowel sounds present in all four quadrants. Pt states bowel movements have been regular.  : Pt denies any pain or frequency with urination.  NEURO: Pt opens eyes spontaneously, behavior appropriate to situation, follows commands, facial expression symmetrical, bilateral hand grasp equal and even, purposeful motor response noted, normal sensation in all extremities when touched with a finger.

## 2023-10-11 NOTE — PHARMACY MED REC
"Admission Medication History     The home medication history was taken by Lucia Anguiano.    You may go to "Admission" then "Reconcile Home Medications" tabs to review and/or act upon these items.     The home medication list has been updated by the Pharmacy department.   Please read ALL comments highlighted in yellow.   Please address this information as you see fit.    Feel free to contact us if you have any questions or require assistance.        Current Outpatient Medications on File Prior to Encounter   Medication Sig    ALPRAZolam (XANAX) 0.25 MG tablet   Give 1 tablet (0.25 mg total) by mouth 2 (two) times daily as needed for Anxiety.    ferrous sulfate (FEOSOL) 325 mg (65 mg iron) Tab tablet   Give 325 mg by mouth once daily.    furosemide (LASIX) 20 MG tablet   Give 20 mg by mouth once daily.    lactulose (CHRONULAC) 20 gram/30 mL Soln     Give 45 mLs (30 g total) by mouth 3 (three) times daily. TITRATE TO 3-4 BOWEL MOVEMENTS DAILY.    levetiracetam 500 mg/5 mL (5 mL) Soln   Give 10 mLs (1,000 mg total) by mouth 2 (two) times daily.    lithium (LITHOBID) 300 MG CR tablet   Give 1 tablet (300 mg total) by mouth every evening.    miconazole nitrate 2% (MICOTIN) 2 % Oint Apply topically 2 (two) times daily to face as needed        OLANZapine (ZYPREXA) 5 MG tablet   Give 1 tablet (5 mg total) by mouth every evening.    pantoprazole (PROTONIX) 40 mg suspension   Give 1 packet (40 mg total) by mouth 2 (two) times daily.    rifAXIMin (XIFAXAN) 550 mg Tab   Give 1 tablet (550 mg total) by mouth 2 (two) times daily.    spironolactone (ALDACTONE) 100 MG tablet   Give 1 tablet (100 mg total) by mouth once daily.       Lucia Anguiano  EXT [id number]                  .          "
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
## 2023-10-12 PROBLEM — E83.42 HYPOMAGNESEMIA: Status: ACTIVE | Noted: 2023-10-12

## 2023-10-12 LAB
ALBUMIN SERPL BCP-MCNC: 2.2 G/DL (ref 3.5–5.2)
ALP SERPL-CCNC: 167 U/L (ref 55–135)
ALT SERPL W/O P-5'-P-CCNC: 37 U/L (ref 10–44)
ANION GAP SERPL CALC-SCNC: 6 MMOL/L (ref 8–16)
AST SERPL-CCNC: 70 U/L (ref 10–40)
BASOPHILS # BLD AUTO: 0.02 K/UL (ref 0–0.2)
BASOPHILS NFR BLD: 0.6 % (ref 0–1.9)
BILIRUB SERPL-MCNC: 3.1 MG/DL (ref 0.1–1)
BUN SERPL-MCNC: 10 MG/DL (ref 6–20)
CALCIUM SERPL-MCNC: 9 MG/DL (ref 8.7–10.5)
CHLORIDE SERPL-SCNC: 105 MMOL/L (ref 95–110)
CO2 SERPL-SCNC: 20 MMOL/L (ref 23–29)
CREAT SERPL-MCNC: 0.6 MG/DL (ref 0.5–1.4)
DIFFERENTIAL METHOD: ABNORMAL
EOSINOPHIL # BLD AUTO: 0.2 K/UL (ref 0–0.5)
EOSINOPHIL NFR BLD: 6.6 % (ref 0–8)
ERYTHROCYTE [DISTWIDTH] IN BLOOD BY AUTOMATED COUNT: 15.3 % (ref 11.5–14.5)
EST. GFR  (NO RACE VARIABLE): >60 ML/MIN/1.73 M^2
GLUCOSE SERPL-MCNC: 96 MG/DL (ref 70–110)
HCT VFR BLD AUTO: 29.2 % (ref 37–48.5)
HGB BLD-MCNC: 9.9 G/DL (ref 12–16)
IMM GRANULOCYTES # BLD AUTO: 0.01 K/UL (ref 0–0.04)
IMM GRANULOCYTES NFR BLD AUTO: 0.3 % (ref 0–0.5)
LYMPHOCYTES # BLD AUTO: 1 K/UL (ref 1–4.8)
LYMPHOCYTES NFR BLD: 28.4 % (ref 18–48)
MAGNESIUM SERPL-MCNC: 1.5 MG/DL (ref 1.6–2.6)
MCH RBC QN AUTO: 36 PG (ref 27–31)
MCHC RBC AUTO-ENTMCNC: 33.9 G/DL (ref 32–36)
MCV RBC AUTO: 106 FL (ref 82–98)
MONOCYTES # BLD AUTO: 0.4 K/UL (ref 0.3–1)
MONOCYTES NFR BLD: 11 % (ref 4–15)
NEUTROPHILS # BLD AUTO: 1.8 K/UL (ref 1.8–7.7)
NEUTROPHILS NFR BLD: 53.1 % (ref 38–73)
NRBC BLD-RTO: 0 /100 WBC
PLATELET # BLD AUTO: 113 K/UL (ref 150–450)
PMV BLD AUTO: 9.8 FL (ref 9.2–12.9)
POTASSIUM SERPL-SCNC: 4.8 MMOL/L (ref 3.5–5.1)
PROT SERPL-MCNC: 5.6 G/DL (ref 6–8.4)
RBC # BLD AUTO: 2.75 M/UL (ref 4–5.4)
SODIUM SERPL-SCNC: 131 MMOL/L (ref 136–145)
WBC # BLD AUTO: 3.35 K/UL (ref 3.9–12.7)

## 2023-10-12 PROCEDURE — 96368 THER/DIAG CONCURRENT INF: CPT

## 2023-10-12 PROCEDURE — 63600175 PHARM REV CODE 636 W HCPCS: Performed by: NURSE PRACTITIONER

## 2023-10-12 PROCEDURE — 63600175 PHARM REV CODE 636 W HCPCS: Performed by: HOSPITALIST

## 2023-10-12 PROCEDURE — 99233 SBSQ HOSP IP/OBS HIGH 50: CPT | Mod: 95,,, | Performed by: HOSPITALIST

## 2023-10-12 PROCEDURE — 96366 THER/PROPH/DIAG IV INF ADDON: CPT

## 2023-10-12 PROCEDURE — 83735 ASSAY OF MAGNESIUM: CPT | Performed by: NURSE PRACTITIONER

## 2023-10-12 PROCEDURE — 96367 TX/PROPH/DG ADDL SEQ IV INF: CPT

## 2023-10-12 PROCEDURE — 99233 PR SUBSEQUENT HOSPITAL CARE,LEVL III: ICD-10-PCS | Mod: 95,,, | Performed by: HOSPITALIST

## 2023-10-12 PROCEDURE — 25000003 PHARM REV CODE 250: Performed by: HOSPITALIST

## 2023-10-12 PROCEDURE — G0378 HOSPITAL OBSERVATION PER HR: HCPCS

## 2023-10-12 PROCEDURE — 80053 COMPREHEN METABOLIC PANEL: CPT | Performed by: NURSE PRACTITIONER

## 2023-10-12 PROCEDURE — 85025 COMPLETE CBC W/AUTO DIFF WBC: CPT | Performed by: NURSE PRACTITIONER

## 2023-10-12 PROCEDURE — 36415 COLL VENOUS BLD VENIPUNCTURE: CPT | Performed by: NURSE PRACTITIONER

## 2023-10-12 PROCEDURE — 25000003 PHARM REV CODE 250: Performed by: NURSE PRACTITIONER

## 2023-10-12 RX ORDER — MAGNESIUM SULFATE HEPTAHYDRATE 40 MG/ML
2 INJECTION, SOLUTION INTRAVENOUS ONCE
Status: COMPLETED | OUTPATIENT
Start: 2023-10-12 | End: 2023-10-12

## 2023-10-12 RX ORDER — LANOLIN ALCOHOL/MO/W.PET/CERES
400 CREAM (GRAM) TOPICAL DAILY
Status: DISCONTINUED | OUTPATIENT
Start: 2023-10-13 | End: 2023-10-18 | Stop reason: HOSPADM

## 2023-10-12 RX ADMIN — FUROSEMIDE 20 MG: 20 TABLET ORAL at 08:10

## 2023-10-12 RX ADMIN — LEVETIRACETAM 1000 MG: 100 SOLUTION ORAL at 08:10

## 2023-10-12 RX ADMIN — RIFAXIMIN 550 MG: 550 TABLET ORAL at 08:10

## 2023-10-12 RX ADMIN — FERROUS SULFATE TAB 325 MG (65 MG ELEMENTAL FE) 1 EACH: 325 (65 FE) TAB at 08:10

## 2023-10-12 RX ADMIN — LEVETIRACETAM 1000 MG: 100 SOLUTION ORAL at 09:10

## 2023-10-12 RX ADMIN — PANTOPRAZOLE SODIUM 40 MG: 40 GRANULE, DELAYED RELEASE ORAL at 08:10

## 2023-10-12 RX ADMIN — RIFAXIMIN 550 MG: 550 TABLET ORAL at 09:10

## 2023-10-12 RX ADMIN — VANCOMYCIN HYDROCHLORIDE 1000 MG: 1 INJECTION, POWDER, LYOPHILIZED, FOR SOLUTION INTRAVENOUS at 12:10

## 2023-10-12 RX ADMIN — ACETAMINOPHEN 650 MG: 325 TABLET ORAL at 03:10

## 2023-10-12 RX ADMIN — CEFTRIAXONE 1 G: 1 INJECTION, POWDER, FOR SOLUTION INTRAMUSCULAR; INTRAVENOUS at 09:10

## 2023-10-12 RX ADMIN — LITHIUM CARBONATE 300 MG: 300 TABLET, FILM COATED, EXTENDED RELEASE ORAL at 09:10

## 2023-10-12 RX ADMIN — PANTOPRAZOLE SODIUM 40 MG: 40 GRANULE, DELAYED RELEASE ORAL at 09:10

## 2023-10-12 RX ADMIN — LACTULOSE 30 G: 20 SOLUTION ORAL at 09:10

## 2023-10-12 RX ADMIN — SPIRONOLACTONE 100 MG: 25 TABLET ORAL at 08:10

## 2023-10-12 RX ADMIN — MAGNESIUM SULFATE HEPTAHYDRATE 2 G: 40 INJECTION, SOLUTION INTRAVENOUS at 12:10

## 2023-10-12 RX ADMIN — OLANZAPINE 5 MG: 5 TABLET, FILM COATED ORAL at 09:10

## 2023-10-12 NOTE — ED NOTES
"The patient is cleaned of a moderate sized formed stool. She is engaging w/ a blunted affect & describes her mood as "good." She denies S/HI, A/VH. She is able to turn from side to side. She mentions a few times, " I have real bad foot drop." She states that she's unable to move her BLE. PROM to BLE. She states that she was receiving PT but it was stopped. She was asked if she could lift her legs & she was able to do so. She is encouraged to lift her BLE while awake even if it's once or twice every hour. Will encourage AROM to BLE. She is positioned to her (R) lateral w/ a pillow support. NAD, DVC maintained for safety.  "

## 2023-10-12 NOTE — ED NOTES
The patient is talking on the unit phone w/ her sister, Zaria who lives in [facility].

## 2023-10-12 NOTE — ED NOTES
900cc nida colored urine emptied from the Saldaña drainage bag. She is currently resting w/ her eyes closed & rise/fall of chest.. NAD, DVC maintained for safety.

## 2023-10-12 NOTE — PROGRESS NOTES
"Pharmacokinetic Initial Assessment: IV Vancomycin    Assessment/Plan:    -Vancomycin initiated for UTI (Enterococcus spp).  -Goal 10-15 mcg/mL.  -Renal function stable.  -Start 1000 mg IV Q12H.  -Trough on 10/13 @ 12:00.    Pharmacy will continue to follow and monitor vancomycin.      Please contact pharmacy at extension [phone] with any questions regarding this assessment.     Thank you for the consult,   Jered Son       Patient brief summary:  Marely Hamilton is a 58 y.o. female initiated on antimicrobial therapy with IV Vancomycin for treatment of suspected urinary tract infection    Drug Allergies:   Review of patient's allergies indicates:   Allergen Reactions    Sulfa (sulfonamide antibiotics) Rash    Codeine Nausea And Vomiting       Actual Body Weight:   57.2 kg    Renal Function:   Estimated Creatinine Clearance: 81.8 mL/min (based on SCr of 0.6 mg/dL).,     Dialysis Method (if applicable):  N/A    CBC (last 72 hours):  Recent Labs   Lab Result Units 10/10/23  2237 10/12/23  0326   WBC K/uL 3.26* 3.35*   Hemoglobin g/dL 11.4* 9.9*   Hematocrit % 33.5* 29.2*   Platelets K/uL 87* 113*   Gran % % 56.2 53.1   Lymph % % 26.4 28.4   Mono % % 11.3 11.0   Eosinophil % % 5.2 6.6   Basophil % % 0.6 0.6   Differential Method  Automated Automated       Metabolic Panel (last 72 hours):  Recent Labs   Lab Result Units 10/10/23  2237 10/11/23  0250 10/12/23  0326   Sodium mmol/L 136  --  131*   Potassium mmol/L 4.4  --  4.8   Chloride mmol/L 108  --  105   CO2 mmol/L 20*  --  20*   Glucose mg/dL 89  --  96   Glucose, UA   --  Negative  --    BUN mg/dL 14  --  10   Creatinine mg/dL 0.5  --  0.6   Creatinine, Urine mg/dL  --  58.0  --    Albumin g/dL 2.6*  --  2.2*   Total Bilirubin mg/dL 3.9*  --  3.1*   Alkaline Phosphatase U/L 157*  --  167*   AST U/L 57*  --  70*   ALT U/L 30  --  37   Magnesium mg/dL  --   --  1.5*       Drug levels (last 3 results):  No results for input(s): "VANCOMYCINRA", "VANCORANDOM", " ""VANCOMYCINPE", "VANCOPEAK", "VANCOMYCINTR", "VANCOTROUGH" in the last 72 hours.    Microbiologic Results:  Microbiology Results (last 7 days)       Procedure Component Value Units Date/Time    Urine culture [[id number]]  (Abnormal) Collected: 10/11/23 0250    Order Status: Completed Specimen: Urine Updated: 10/12/23 0749     Urine Culture, Routine ENTEROCOCCUS SPECIES  50,000 - 99,999 cfu/ml  Identification and susceptibility pending      Narrative:      Specimen Source->Urine            "

## 2023-10-12 NOTE — PROGRESS NOTES
10/12/23 1000   WOCN Assessment   WOCN Total Time (mins) 20   Visit Date 10/12/23   Visit Time 1000   Consult Type New   WOCN Speciality Wound   Intervention assessed;changed;applied;chart review;coordination of care;orders        Altered Skin Integrity 10/11/23 2200 Sacral spine   Date First Assessed/Time First Assessed: 10/11/23 2200   Location: Sacral spine   Wound Image    Description of Altered Skin Integrity Intact skin with non-blanchable redness of localized area   Drainage Amount None   Red (%), Wound Tissue Color 100 %     [facility] Surg  Wound Care    Patient Name:  Marely Hamilton   MRN:  [id number]  Date: 10/12/2023  Diagnosis: Acute cystitis    History:     Past Medical History:   Diagnosis Date    Addiction to drug     Alcohol abuse     Alcohol abuse, in remission 6/15/2015    14.5 weeks ago; AA weekly    Anemia     Anxiety 6/15/2015    Behavioral problem     Bipolar disorder     Bipolar disorder in remission 6/15/2015    Cirrhosis, Laennec's 6/15/2015    Depression     Encounter for blood transfusion     Epistaxis 6/15/2015    Fatigue     Glaucoma     Hematuria     Hepatic encephalopathy 6/15/2015    Hepatic enlargement     History of psychiatric care     History of psychiatric hospitalization     History of seizure 6/15/2015    1    hx of intentional Tylenol overdose 6/15/2015    2005- situational and hx of bipolar    Hx of psychiatric care     Macrocytic anemia 9/18/2015    6 units PRBC since June 2015    Jeana     Osteoarthritis     Other ascites 6/15/2015    Psychiatric exam requested by authority     Psychiatric problem     Psychosis 9/26/2019    Renal disorder     Seizures     Self-harming behavior     Suicide attempt     Therapy     Thrombocytopenia 6/15/2015       Social History     Socioeconomic History    Marital status: Single   Occupational History    Occupation: Disabled     Employer: DISABLED   Tobacco Use    Smoking status: Never    Smokeless tobacco: Never   Substance and Sexual  Activity    Alcohol use: Not Currently     Comment: hx of ETOH abuse with cirrhosis of liver    Drug use: No    Sexual activity: Not Currently   Other Topics Concern    Patient feels they ought to cut down on drinking/drug use No    Patient annoyed by others criticizing their drinking/drug use No    Patient has felt bad or guilty about drinking/drug use No    Patient has had a drink/used drugs as an eye opener in the AM No   Social History Narrative    Pt has 1 older and 1 younger sister.  Her father committed suicide when she was 18.  She has a [employer] degree and worked as an , but she has been disabled since age 40.  She never  and has no children.  She is not dating and claims no current friends.  She currently lives with her mother along with her pet dog.  She denies any hobbies and says she is not Buddhism.     Social Determinants of Health     Financial Resource Strain: Low Risk  (8/15/2023)    Overall Financial Resource Strain (CARDIA)     Difficulty of Paying Living Expenses: Not hard at all   Food Insecurity: No Food Insecurity (8/15/2023)    Hunger Vital Sign     Worried About Running Out of Food in the Last Year: Never true     Ran Out of Food in the Last Year: Never true   Transportation Needs: No Transportation Needs (8/15/2023)    PRAPARE - Transportation     Lack of Transportation (Medical): No     Lack of Transportation (Non-Medical): No   Physical Activity: Inactive (8/15/2023)    Exercise Vital Sign     Days of Exercise per Week: 0 days     Minutes of Exercise per Session: 0 min   Stress: Stress Concern Present (8/15/2023)    [place] [employer] - Occupational Stress Questionnaire     Feeling of Stress : Very much   Social Connections: Unknown (8/15/2023)    Social Connection and Isolation Panel [NHANES]     Frequency of Communication with Friends and Family: Patient refused     Frequency of Social Gatherings with Friends and Family: Patient refused     Attends  [employer] Services: Never     Active Member of Clubs or Organizations: No     Attends Club or Organization Meetings: Never     Marital Status: Never    Housing Stability: Low Risk  (8/15/2023)    Housing Stability Vital Sign     Unable to Pay for Housing in the Last Year: No     Number of Places Lived in the Last Year: 1     Unstable Housing in the Last Year: No       Precautions:     Allergies as of 10/10/2023 - Reviewed 10/10/2023   Allergen Reaction Noted    Sulfa (sulfonamide antibiotics) Rash 11/26/2014    Codeine Nausea And Vomiting 04/26/2018       WOC Assessment Details/Treatment   Patient consult for wound care to sacral without any broken skin, 100% redness. Treatment to cleanse sacral area with soap and water pat dry apply a foam sacral border. Every two days and prn. Patient  need assistance to reposition herself, a waffle mattress , and purple wedge ordered.    Recommendations made to primary team for  the above Orders placed.     10/12/2023

## 2023-10-12 NOTE — PLAN OF CARE
Problem: Infection  Goal: Absence of Infection Signs and Symptoms  Outcome: Ongoing, Progressing     Problem: Adult Inpatient Plan of Care  Goal: Plan of Care Review  Outcome: Ongoing, Progressing  Goal: Patient-Specific Goal (Individualized)  Outcome: Ongoing, Progressing  Goal: Absence of Hospital-Acquired Illness or Injury  Outcome: Ongoing, Progressing  Goal: Optimal Comfort and Wellbeing  Outcome: Ongoing, Progressing  Goal: Readiness for Transition of Care  Outcome: Ongoing, Progressing     Problem: Adjustment to Illness (Sepsis/Septic Shock)  Goal: Optimal Coping  Outcome: Ongoing, Progressing     Problem: Bleeding (Sepsis/Septic Shock)  Goal: Absence of Bleeding  Outcome: Ongoing, Progressing     Problem: Glycemic Control Impaired (Sepsis/Septic Shock)  Goal: Blood Glucose Level Within Desired Range  Outcome: Ongoing, Progressing     Problem: Infection Progression (Sepsis/Septic Shock)  Goal: Absence of Infection Signs and Symptoms  Outcome: Ongoing, Progressing     Problem: Nutrition Impaired (Sepsis/Septic Shock)  Goal: Optimal Nutrition Intake  Outcome: Ongoing, Progressing     Problem: Fluid Imbalance (Pneumonia)  Goal: Fluid Balance  Outcome: Ongoing, Progressing     Problem: Infection (Pneumonia)  Goal: Resolution of Infection Signs and Symptoms  Outcome: Ongoing, Progressing     Problem: Respiratory Compromise (Pneumonia)  Goal: Effective Oxygenation and Ventilation  Outcome: Ongoing, Progressing     Problem: Skin Injury Risk Increased  Goal: Skin Health and Integrity  Outcome: Ongoing, Progressing     Problem: Impaired Wound Healing  Goal: Optimal Wound Healing  Outcome: Ongoing, Progressing  Antibiotics changed from rocephin to vancomycin.

## 2023-10-12 NOTE — ED NOTES
"The patient is easily agitated while taking her medications. She begins by stating that she's been here for 2 days & she should be getting all of her medications. She is focused on a supplement that she takes which is not yet ordered. She completed a cup of water w/ her meds & when nearly completed w/ all of her meds she ran out of water but Apple Juice was available. The patient folded her arms stating, " you didn't give me any water." Another cup of water was obtained & patient accepted all of her meds. DVC is maintained for safety.  "

## 2023-10-12 NOTE — NURSING
Nurses Note -- 4 Eyes      10/11/2023   11:16 PM      Skin assessed during: Admit      [] No Altered Skin Integrity Present    []Prevention Measures Documented      [x] Yes- Altered Skin Integrity Present or Discovered   [] LDA Added if Not in Epic (Describe Wound)   [x] New Altered Skin Integrity was Present on Admit and Documented in LDA   [] Wound Image Taken    Wound Care Consulted? Yes    Attending Nurse:  Emiliano Rangel RN/Staff Member:   NEHAL Bear

## 2023-10-12 NOTE — ED NOTES
"Marely has completed a serving of Apple Sauce & is requesting, " I'll take that pudding." Appears comfortable. NAD, DVC maintained for safety.  "

## 2023-10-13 PROBLEM — Z16.21 VRE (VANCOMYCIN-RESISTANT ENTEROCOCCI) INFECTION: Status: ACTIVE | Noted: 2023-10-13

## 2023-10-13 PROBLEM — N39.0 URINARY TRACT INFECTION ASSOCIATED WITH INDWELLING URETHRAL CATHETER: Status: ACTIVE | Noted: 2023-09-01

## 2023-10-13 PROBLEM — T83.511A URINARY TRACT INFECTION ASSOCIATED WITH INDWELLING URETHRAL CATHETER: Status: ACTIVE | Noted: 2023-09-01

## 2023-10-13 PROBLEM — A49.1 VRE (VANCOMYCIN-RESISTANT ENTEROCOCCI) INFECTION: Status: ACTIVE | Noted: 2023-10-13

## 2023-10-13 LAB
ALBUMIN SERPL BCP-MCNC: 2.4 G/DL (ref 3.5–5.2)
ALP SERPL-CCNC: 209 U/L (ref 55–135)
ALT SERPL W/O P-5'-P-CCNC: 39 U/L (ref 10–44)
ANION GAP SERPL CALC-SCNC: 6 MMOL/L (ref 8–16)
APTT PPP: 30.8 SEC (ref 21–32)
AST SERPL-CCNC: 70 U/L (ref 10–40)
BACTERIA UR CULT: ABNORMAL
BACTERIA UR CULT: ABNORMAL
BASOPHILS # BLD AUTO: 0.02 K/UL (ref 0–0.2)
BASOPHILS NFR BLD: 0.6 % (ref 0–1.9)
BILIRUB SERPL-MCNC: 3.1 MG/DL (ref 0.1–1)
BUN SERPL-MCNC: 12 MG/DL (ref 6–20)
CALCIUM SERPL-MCNC: 9.1 MG/DL (ref 8.7–10.5)
CHLORIDE SERPL-SCNC: 105 MMOL/L (ref 95–110)
CO2 SERPL-SCNC: 19 MMOL/L (ref 23–29)
CREAT SERPL-MCNC: 0.7 MG/DL (ref 0.5–1.4)
DIFFERENTIAL METHOD: ABNORMAL
EOSINOPHIL # BLD AUTO: 0.1 K/UL (ref 0–0.5)
EOSINOPHIL NFR BLD: 4 % (ref 0–8)
ERYTHROCYTE [DISTWIDTH] IN BLOOD BY AUTOMATED COUNT: 15.2 % (ref 11.5–14.5)
EST. GFR  (NO RACE VARIABLE): >60 ML/MIN/1.73 M^2
GLUCOSE SERPL-MCNC: 86 MG/DL (ref 70–110)
HCT VFR BLD AUTO: 30.6 % (ref 37–48.5)
HGB BLD-MCNC: 10.1 G/DL (ref 12–16)
IMM GRANULOCYTES # BLD AUTO: 0.02 K/UL (ref 0–0.04)
IMM GRANULOCYTES NFR BLD AUTO: 0.6 % (ref 0–0.5)
INR PPP: 1.5 (ref 0.8–1.2)
LYMPHOCYTES # BLD AUTO: 0.8 K/UL (ref 1–4.8)
LYMPHOCYTES NFR BLD: 25 % (ref 18–48)
MAGNESIUM SERPL-MCNC: 2.1 MG/DL (ref 1.6–2.6)
MCH RBC QN AUTO: 35.2 PG (ref 27–31)
MCHC RBC AUTO-ENTMCNC: 33 G/DL (ref 32–36)
MCV RBC AUTO: 107 FL (ref 82–98)
MONOCYTES # BLD AUTO: 0.4 K/UL (ref 0.3–1)
MONOCYTES NFR BLD: 11.1 % (ref 4–15)
NEUTROPHILS # BLD AUTO: 1.9 K/UL (ref 1.8–7.7)
NEUTROPHILS NFR BLD: 58.7 % (ref 38–73)
NRBC BLD-RTO: 0 /100 WBC
PHOSPHATE SERPL-MCNC: 3 MG/DL (ref 2.7–4.5)
PLATELET # BLD AUTO: 76 K/UL (ref 150–450)
PMV BLD AUTO: 9.4 FL (ref 9.2–12.9)
POTASSIUM SERPL-SCNC: 4.6 MMOL/L (ref 3.5–5.1)
PROT SERPL-MCNC: 6.2 G/DL (ref 6–8.4)
PROTHROMBIN TIME: 15.3 SEC (ref 9–12.5)
RBC # BLD AUTO: 2.87 M/UL (ref 4–5.4)
SODIUM SERPL-SCNC: 130 MMOL/L (ref 136–145)
WBC # BLD AUTO: 3.24 K/UL (ref 3.9–12.7)

## 2023-10-13 PROCEDURE — G0378 HOSPITAL OBSERVATION PER HR: HCPCS

## 2023-10-13 PROCEDURE — 83735 ASSAY OF MAGNESIUM: CPT | Performed by: HOSPITALIST

## 2023-10-13 PROCEDURE — 96366 THER/PROPH/DIAG IV INF ADDON: CPT

## 2023-10-13 PROCEDURE — 94761 N-INVAS EAR/PLS OXIMETRY MLT: CPT

## 2023-10-13 PROCEDURE — 25000003 PHARM REV CODE 250: Performed by: HOSPITALIST

## 2023-10-13 PROCEDURE — 63600175 PHARM REV CODE 636 W HCPCS: Performed by: HOSPITALIST

## 2023-10-13 PROCEDURE — 25000003 PHARM REV CODE 250: Performed by: NURSE PRACTITIONER

## 2023-10-13 PROCEDURE — 36415 COLL VENOUS BLD VENIPUNCTURE: CPT | Performed by: HOSPITALIST

## 2023-10-13 PROCEDURE — 85025 COMPLETE CBC W/AUTO DIFF WBC: CPT | Performed by: HOSPITALIST

## 2023-10-13 PROCEDURE — 80053 COMPREHEN METABOLIC PANEL: CPT | Performed by: HOSPITALIST

## 2023-10-13 PROCEDURE — 99499 NO LOS: ICD-10-PCS | Mod: ,,, | Performed by: PHYSICIAN ASSISTANT

## 2023-10-13 PROCEDURE — 99223 PR INITIAL HOSPITAL CARE,LEVL III: ICD-10-PCS | Mod: ,,, | Performed by: INTERNAL MEDICINE

## 2023-10-13 PROCEDURE — 99233 PR SUBSEQUENT HOSPITAL CARE,LEVL III: ICD-10-PCS | Mod: 95,,, | Performed by: HOSPITALIST

## 2023-10-13 PROCEDURE — 99223 1ST HOSP IP/OBS HIGH 75: CPT | Mod: ,,, | Performed by: INTERNAL MEDICINE

## 2023-10-13 PROCEDURE — 99499 UNLISTED E&M SERVICE: CPT | Mod: ,,, | Performed by: PHYSICIAN ASSISTANT

## 2023-10-13 PROCEDURE — 84100 ASSAY OF PHOSPHORUS: CPT | Performed by: HOSPITALIST

## 2023-10-13 PROCEDURE — 99233 SBSQ HOSP IP/OBS HIGH 50: CPT | Mod: 95,,, | Performed by: HOSPITALIST

## 2023-10-13 PROCEDURE — 85610 PROTHROMBIN TIME: CPT | Performed by: HOSPITALIST

## 2023-10-13 PROCEDURE — 85730 THROMBOPLASTIN TIME PARTIAL: CPT | Performed by: HOSPITALIST

## 2023-10-13 RX ORDER — LACTULOSE 10 G/15ML
30 SOLUTION ORAL DAILY
Status: DISCONTINUED | OUTPATIENT
Start: 2023-10-14 | End: 2023-10-18 | Stop reason: HOSPADM

## 2023-10-13 RX ORDER — LINEZOLID 600 MG/1
600 TABLET, FILM COATED ORAL EVERY 12 HOURS
Status: COMPLETED | OUTPATIENT
Start: 2023-10-13 | End: 2023-10-15

## 2023-10-13 RX ORDER — LINEZOLID 600 MG/1
600 TABLET, FILM COATED ORAL EVERY 12 HOURS
Status: DISCONTINUED | OUTPATIENT
Start: 2023-10-13 | End: 2023-10-13

## 2023-10-13 RX ORDER — GABAPENTIN 300 MG/1
300 CAPSULE ORAL ONCE
Status: COMPLETED | OUTPATIENT
Start: 2023-10-13 | End: 2023-10-13

## 2023-10-13 RX ADMIN — GABAPENTIN 300 MG: 300 CAPSULE ORAL at 09:10

## 2023-10-13 RX ADMIN — THERA TABS 1 TABLET: TAB at 08:10

## 2023-10-13 RX ADMIN — FUROSEMIDE 20 MG: 20 TABLET ORAL at 08:10

## 2023-10-13 RX ADMIN — LINEZOLID 600 MG: 600 TABLET, FILM COATED ORAL at 08:10

## 2023-10-13 RX ADMIN — FERROUS SULFATE TAB 325 MG (65 MG ELEMENTAL FE) 1 EACH: 325 (65 FE) TAB at 08:10

## 2023-10-13 RX ADMIN — LINEZOLID 600 MG: 600 TABLET, FILM COATED ORAL at 11:10

## 2023-10-13 RX ADMIN — RIFAXIMIN 550 MG: 550 TABLET ORAL at 08:10

## 2023-10-13 RX ADMIN — SPIRONOLACTONE 100 MG: 25 TABLET ORAL at 08:10

## 2023-10-13 RX ADMIN — PANTOPRAZOLE SODIUM 40 MG: 40 GRANULE, DELAYED RELEASE ORAL at 08:10

## 2023-10-13 RX ADMIN — LEVETIRACETAM 1000 MG: 100 SOLUTION ORAL at 08:10

## 2023-10-13 RX ADMIN — LACTULOSE 30 G: 20 SOLUTION ORAL at 08:10

## 2023-10-13 RX ADMIN — OLANZAPINE 5 MG: 5 TABLET, FILM COATED ORAL at 08:10

## 2023-10-13 RX ADMIN — LITHIUM CARBONATE 300 MG: 300 TABLET, FILM COATED, EXTENDED RELEASE ORAL at 08:10

## 2023-10-13 RX ADMIN — VANCOMYCIN HYDROCHLORIDE 1000 MG: 1 INJECTION, POWDER, LYOPHILIZED, FOR SOLUTION INTRAVENOUS at 10:10

## 2023-10-13 RX ADMIN — Medication 400 MG: at 08:10

## 2023-10-13 NOTE — PLAN OF CARE
Problem: Adult Inpatient Plan of Care  Goal: Plan of Care Review  Outcome: Ongoing, Progressing  Goal: Absence of Hospital-Acquired Illness or Injury  Outcome: Ongoing, Progressing     Problem: Adjustment to Illness (Sepsis/Septic Shock)  Goal: Optimal Coping  Outcome: Ongoing, Progressing     Problem: Bleeding (Sepsis/Septic Shock)  Goal: Absence of Bleeding  Outcome: Ongoing, Progressing     Problem: Glycemic Control Impaired (Sepsis/Septic Shock)  Goal: Blood Glucose Level Within Desired Range  Outcome: Ongoing, Progressing     Problem: Nutrition Impaired (Sepsis/Septic Shock)  Goal: Optimal Nutrition Intake  Outcome: Ongoing, Progressing     Problem: Skin Injury Risk Increased  Goal: Skin Health and Integrity  Outcome: Ongoing, Progressing     Problem: Violence Risk or Actual  Goal: Anger and Impulse Control  Outcome: Ongoing, Progressing     Problem: Infection  Goal: Absence of Infection Signs and Symptoms  Outcome: Ongoing, Not Progressing     Problem: Adult Inpatient Plan of Care  Goal: Patient-Specific Goal (Individualized)  Outcome: Ongoing, Not Progressing  Goal: Optimal Comfort and Wellbeing  Outcome: Ongoing, Not Progressing  Goal: Readiness for Transition of Care  Outcome: Ongoing, Not Progressing     Problem: Infection Progression (Sepsis/Septic Shock)  Goal: Absence of Infection Signs and Symptoms  Outcome: Ongoing, Not Progressing     Problem: Infection (Pneumonia)  Goal: Resolution of Infection Signs and Symptoms  Outcome: Ongoing, Not Progressing     Problem: Impaired Wound Healing  Goal: Optimal Wound Healing  Outcome: Ongoing, Not Progressing   Pt is A,A,O x 4 . Stable V/S. Pt slept between care. Pt turned in bed independently and pillows used. Incontinence care done as needed . Sacral wound are done and foam dressing changed, sacral area looks red but no breakdown. Pt C/O sacral pain 8/10 . MD notified and pt given gabapentin one dose as MD ordered. Saldaña care done. IV vancomycin discontinued  and pt started on po antibiotic today. Pt continued to be CEC and sitter remained at bedside all the time. Pt denied suicidal ideation during the shift.

## 2023-10-13 NOTE — ASSESSMENT & PLAN NOTE
Patient has Abnormal Magnesium: hypomagnesemia. Will continue to monitor electrolytes closely. Will replace the affected electrolytes and repeat labs to be done after interventions completed. The patient's magnesium results have been reviewed and are listed below.  Recent Labs   Lab 10/12/23  0326   MG 1.5*      -mag sulfate 2g IV x1 today, then oral magnesium 400 qd   -repeat level with morning labs.

## 2023-10-13 NOTE — CONSULTS
VANCOMYCIN DOSING BY PHARMACY DISCONTINUATION NOTE    Marely Hamilton is a 58 y.o. female who had been consulted for vancomycin dosing.    The pharmacy consult for vancomycin dosing has been discontinued.     Vancomycin Dosing by Pharmacy Consult will sign-off. Please reconsult if necessary. Thank you for allowing us to participate in this patient's care.     Jered Son, PharmD, BCPS  Ext. [phone]

## 2023-10-13 NOTE — ASSESSMENT & PLAN NOTE
Acute metabolic encephalopathy - RESOLVED  Hepatic encephalopathy - RESOLVED  History of recurrent UTIs with current admission for enterococcal UTI   Chronic indwelling baugh catheter for urinary retention  -increased agitation and aggression, refusing home medications    -HDS and afebrile on admit  -admission labs:   -CBC with WBC 3, H/H 11.4/33.5, platelets 87 (all at or better than baseline). Chemistry with stable renal function, glucose 89, K 4.4, alk phose 157, TBili 3.9, AST 57, ALT 30 (all similar to baseline). Ammonia 52. TSH 0.115/FT4 1.12. PT/INR pending. Lithium level 0.1. Serum acetaminophen <3.0. Serum alcohol <10. UDS + benzos. UA concerning for infection.   -admission micro: enterococcus spp isolated in urine - > discontinued Rocephin, Vanc started with pharmacy to dose/monitor   -admission diagnostics:   -CT A/P with cirrhosis with signs of portal hypertension including splenomegaly and portosystemic collaterals; cholelithiasis; decreased 9.8 cm left retroperitoneal collection, which could represent a hematoma or abscess; large colonic stool burden; no bowel obstruction or inflammation.  -PEC'd in ED >>> continue  -Psychiatry evaluation    -continue home lithium, zyprexa, and PRN alprazolam   -med and dose adjustments per Psych  -continue home lactulose, rifaximin, and spironolactone  -delirium and fall precautions

## 2023-10-13 NOTE — CONSULTS
[facility]  Infectious Disease  Consult Note    Patient Name: Marely Hamilton  MRN: [id number]  Admission Date: 10/10/2023  Hospital Length of Stay: 1 days  Attending Physician: Kena Esqueda MD  Primary Care Provider: Viktor Ross MD     Isolation Status: No active isolations    Patient information was obtained from patient and past medical records.      Inpatient consult to Infectious Diseases  Consult performed by: Aliyah Pettit PA-C  Consult ordered by: Kena Esqueda MD        Assessment/Plan:     ID  VRE (vancomycin-resistant Enterococci) infection  · Continue Linezolid 600 mg PO BID x 3 days for cystitis  · Consider baugh removal   · ID will sign off.        Thank you for your consult. I will sign off. Please contact us if you have any additional questions.    Aliyah Pettit PA-C  Infectious Disease  [facility]    Subjective:     Principal Problem: Acute cystitis    HPI: Ms. 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 ID consulted for VRE UTI recommendations.    She reports her roommate said rude things to her and it upset her. Mentally she is feeling better today.  Prior to admission she reports having burning with urination. Denies baugh at her facility, though had one on admit. She has been on antibiotics and feels better. Denies fevers. Denies flank pain.    In the ED HDS and afebrile. Ua positive. CT A/P with cirrhosis with signs of portal hypertension including splenomegaly and portosystemic collaterals; cholelithiasis; decreased 9.8 cm left retroperitoneal collection, large colonic stool  burden; no bowel obstruction or inflammation. No hydronephrosis or stones. Urine cx + VRE.         Past Medical History:   Diagnosis Date    Addiction to drug     Alcohol abuse     Alcohol abuse, in remission 6/15/2015    14.5 weeks ago; AA weekly    Anemia     Anxiety 6/15/2015    Behavioral problem     Bipolar disorder     Bipolar disorder in remission 6/15/2015    Cirrhosis, Laennec's 6/15/2015    Depression     Encounter for blood transfusion     Epistaxis 6/15/2015    Fatigue     Glaucoma     Hematuria     Hepatic encephalopathy 6/15/2015    Hepatic enlargement     History of psychiatric care     History of psychiatric hospitalization     History of seizure 6/15/2015    1    hx of intentional Tylenol overdose 6/15/2015    2005- situational and hx of bipolar    Hx of psychiatric care     Macrocytic anemia 9/18/2015    6 units PRBC since June 2015    Jeana     Osteoarthritis     Other ascites 6/15/2015    Psychiatric exam requested by authority     Psychiatric problem     Psychosis 9/26/2019    Renal disorder     Seizures     Self-harming behavior     Suicide attempt     Therapy     Thrombocytopenia 6/15/2015       Past Surgical History:   Procedure Laterality Date    COSMETIC SURGERY      EMBOLIZATION N/A 6/11/2023    Procedure: EMBOLIZATION, BLOOD VESSEL;  Surgeon: Debbie Surgeon;  Location: [facility];  Service: Anesthesiology;  Laterality: N/A;    ESOPHAGOGASTRODUODENOSCOPY      ESOPHAGOGASTRODUODENOSCOPY N/A 7/6/2023    Procedure: EGD (ESOPHAGOGASTRODUODENOSCOPY);  Surgeon: Bernice Guillen MD;  Location: [address]);  Service: Endoscopy;  Laterality: N/A;    ESOPHAGOGASTRODUODENOSCOPY N/A 7/11/2023    Procedure: EGD (ESOPHAGOGASTRODUODENOSCOPY);  Surgeon: Rangel Broussard MD;  Location: [address]);  Service: Endoscopy;  Laterality: N/A;       Review of patient's allergies indicates:   Allergen Reactions    Sulfa (sulfonamide antibiotics) Rash     Codeine Nausea And Vomiting       Medications:  Medications Prior to Admission   Medication Sig    ALPRAZolam (XANAX) 0.25 MG tablet Take 1 tablet (0.25 mg total) by mouth 2 (two) times daily as needed for Anxiety.    ferrous sulfate (FEOSOL) 325 mg (65 mg iron) Tab tablet Take 325 mg by mouth once daily.    furosemide (LASIX) 20 MG tablet Take 20 mg by mouth once daily.    lactulose (CHRONULAC) 20 gram/30 mL Soln Take 45 mLs (30 g total) by mouth 3 (three) times daily. TITRATE TO 3-4 BOWEL MOVEMENTS DAILY.    levetiracetam 500 mg/5 mL (5 mL) Soln Take 10 mLs (1,000 mg total) by mouth 2 (two) times daily.    lithium (LITHOBID) 300 MG CR tablet Take 1 tablet (300 mg total) by mouth every evening.    miconazole nitrate 2% (MICOTIN) 2 % Oint Apply topically 2 (two) times daily. (Patient taking differently: Apply topically 2 (two) times daily. To face as needed)    OLANZapine (ZYPREXA) 5 MG tablet Take 1 tablet (5 mg total) by mouth every evening.    pantoprazole (PROTONIX) 40 mg suspension Take 1 packet (40 mg total) by mouth 2 (two) times daily.    rifAXIMin (XIFAXAN) 550 mg Tab Take 1 tablet (550 mg total) by mouth 2 (two) times daily.    spironolactone (ALDACTONE) 100 MG tablet Take 1 tablet (100 mg total) by mouth once daily.     Antibiotics (From admission, onward)      Start     Stop Route Frequency Ordered    10/13/23 1130  linezolid tablet 600 mg         10/18/23 0859 Oral Every 12 hours 10/13/23 1021    10/11/23 1200  rifAXIMin tablet 550 mg         -- Oral 2 times daily 10/11/23 1048          Antifungals (From admission, onward)      None          Antivirals (From admission, onward)      None             Immunization History   Administered Date(s) Administered    COVID-19, MRNA, LN-S, PF (MODERNA FULL 0.5 ML DOSE) 04/08/2021, 05/06/2021, 01/26/2022    PPD Test 09/20/2019, 07/21/2023       Family History       Problem Relation (Age of Onset)    Alcohol abuse Father, Sister, Paternal Grandfather     Bipolar disorder Father    Hypertension Mother    Suicide Father          Social History     Socioeconomic History    Marital status: Single   Occupational History    Occupation: Disabled     Employer: DISABLED   Tobacco Use    Smoking status: Never    Smokeless tobacco: Never   Substance and Sexual Activity    Alcohol use: Not Currently     Comment: hx of ETOH abuse with cirrhosis of liver    Drug use: No    Sexual activity: Not Currently   Other Topics Concern    Patient feels they ought to cut down on drinking/drug use No    Patient annoyed by others criticizing their drinking/drug use No    Patient has felt bad or guilty about drinking/drug use No    Patient has had a drink/used drugs as an eye opener in the AM No   Social History Narrative    Pt has 1 older and 1 younger sister.  Her father committed suicide when she was 18.  She has a [employer] degree and worked as an , but she has been disabled since age 40.  She never  and has no children.  She is not dating and claims no current friends.  She currently lives with her mother along with her pet dog.  She denies any hobbies and says she is not Worship.     Social Determinants of Health     Financial Resource Strain: Low Risk  (8/15/2023)    Overall Financial Resource Strain (CARDIA)     Difficulty of Paying Living Expenses: Not hard at all   Food Insecurity: No Food Insecurity (8/15/2023)    Hunger Vital Sign     Worried About Running Out of Food in the Last Year: Never true     Ran Out of Food in the Last Year: Never true   Transportation Needs: No Transportation Needs (8/15/2023)    PRAPARE - Transportation     Lack of Transportation (Medical): No     Lack of Transportation (Non-Medical): No   Physical Activity: Inactive (8/15/2023)    Exercise Vital Sign     Days of Exercise per Week: 0 days     Minutes of Exercise per Session: 0 min   Stress: Stress Concern Present (8/15/2023)    [employer] [employer] [place] [employer] -  Occupational Stress Questionnaire     Feeling of Stress : Very much   Social Connections: Unknown (8/15/2023)    Social Connection and Isolation Panel [NHANES]     Frequency of Communication with Friends and Family: Patient refused     Frequency of Social Gatherings with Friends and Family: Patient refused     Attends Church Services: Never     Active Member of Clubs or Organizations: No     Attends Club or Organization Meetings: Never     Marital Status: Never    Housing Stability: Low Risk  (8/15/2023)    Housing Stability Vital Sign     Unable to Pay for Housing in the Last Year: No     Number of Places Lived in the Last Year: 1     Unstable Housing in the Last Year: No     Review of Systems   Constitutional:  Negative for chills and fever.   HENT:  Negative for congestion.    Respiratory:  Negative for cough, chest tightness and shortness of breath.    Cardiovascular:  Negative for chest pain.   Gastrointestinal:  Negative for abdominal pain, constipation, diarrhea, nausea and vomiting.   Genitourinary:  Positive for difficulty urinating and dysuria. Negative for flank pain.   Psychiatric/Behavioral:  Negative for agitation and confusion.    All other systems reviewed and are negative.    Objective:     Vital Signs (Most Recent):  Temp: 99.9 °F (37.7 °C) (10/13/23 1044)  Pulse: 85 (10/13/23 1044)  Resp: 18 (10/13/23 1044)  BP: 136/60 (10/13/23 1044)  SpO2: 99 % (10/13/23 1044) Vital Signs (24h Range):  Temp:  [97.4 °F (36.3 °C)-99.9 °F (37.7 °C)] 99.9 °F (37.7 °C)  Pulse:  [73-97] 85  Resp:  [16-18] 18  SpO2:  [94 %-99 %] 99 %  BP: (108-136)/(51-62) 136/60     Weight: 57.2 kg (126 lb)  Body mass index is 23.05 kg/m².    Estimated Creatinine Clearance: 70.1 mL/min (based on SCr of 0.7 mg/dL).     Physical Exam  Constitutional:       General: She is not in acute distress.     Appearance: Normal appearance. She is well-developed. She is not ill-appearing or diaphoretic.   HENT:      Head:  Normocephalic and atraumatic.      Right Ear: External ear normal.      Left Ear: External ear normal.      Nose: Nose normal.   Eyes:      General: No scleral icterus.        Right eye: No discharge.         Left eye: No discharge.      Extraocular Movements: Extraocular movements intact.      Conjunctiva/sclera: Conjunctivae normal.   Pulmonary:      Effort: Pulmonary effort is normal. No respiratory distress.      Breath sounds: No stridor.   Musculoskeletal:         General: Normal range of motion.   Skin:     Findings: No erythema or rash.   Neurological:      General: No focal deficit present.      Mental Status: She is alert and oriented to person, place, and time. Mental status is at baseline.      Cranial Nerves: No cranial nerve deficit.   Psychiatric:         Mood and Affect: Mood normal.         Behavior: Behavior normal.         Thought Content: Thought content normal.         Judgment: Judgment normal.          Significant Labs: Blood Culture:   Recent Labs   Lab 09/01/23  0054 09/12/23  1844 09/12/23  1849 09/16/23  2208 09/16/23 2209   LABBLOO No growth after 5 days.  No growth after 5 days. No growth after 5 days. Gram stain aer bottle:  Gram positive rods  Results called to and read back by: Sindy Duran MD 09/16/2023  14:26  LACTOBACILLUS SPECIES  Further identified as Lactobacillus rhamnosus  * No growth after 5 days. No growth after 5 days.     BMP:   Recent Labs   Lab 10/13/23  0455   GLU 86   *   K 4.6      CO2 19*   BUN 12   CREATININE 0.7   CALCIUM 9.1   MG 2.1     CBC:   Recent Labs   Lab 10/12/23  0326 10/13/23  0455   WBC 3.35* 3.24*   HGB 9.9* 10.1*   HCT 29.2* 30.6*   * 76*     CMP:   Recent Labs   Lab 10/12/23  0326 10/13/23  0455   * 130*   K 4.8 4.6    105   CO2 20* 19*   GLU 96 86   BUN 10 12   CREATININE 0.6 0.7   CALCIUM 9.0 9.1   PROT 5.6* 6.2   ALBUMIN 2.2* 2.4*   BILITOT 3.1* 3.1*   ALKPHOS 167* 209*   AST 70* 70*   ALT 37 39   ANIONGAP 6* 6*  "    Fungus Culture (Blood or Bone Marrow): No results for input(s): "FUNGUSCULTUR" in the last 4320 hours.  Microbiology Results (last 7 days)       Procedure Component Value Units Date/Time    Urine culture [[id number]]  (Abnormal)  (Susceptibility) Collected: 10/11/23 0250    Order Status: Completed Specimen: Urine Updated: 10/13/23 1355     Urine Culture, Routine ENTEROCOCCUS FAECIUM VRE  50,000 - 99,999 cfu/ml        CANDIDA TROPICALIS  10,000 - 49,999 cfu/ml  Treatment of asymptomatic candiduria is not recommended (except for   specific populations). Candida isolated in the urine typically   represents colonization. If an indwelling urinary catheter is present  it should be removed or replaced.      Narrative:      Specimen Source->Urine          Procalcitonin: No results for input(s): "PROCAL" in the last 48 hours.  Quantiferon: No results for input(s): "NIL", "TBAG", "TBAGNIL", "MITOGENNIL", "TBGOLD" in the last 48 hours.  Respiratory Culture: No results for input(s): "GSRESP", "RESPIRATORYC" in the last 4320 hours.  Urine Culture:   Recent Labs   Lab 08/14/23 0846 08/31/23 2159 09/12/23 2124 09/17/23 0007 10/11/23  0250   LABURIN ESCHERICHIA COLI  >100,000 cfu/ml  * Multiple organisms isolated. None in predominance.  Repeat if  clinically necessary. ESCHERICHIA COLI  >100,000 cfu/ml  No other significant isolate  * Multiple organisms isolated. None in predominance.  Repeat if  clinically necessary. ENTEROCOCCUS FAECIUM VRE  50,000 - 99,999 cfu/ml  *  CANDIDA TROPICALIS  10,000 - 49,999 cfu/ml  Treatment of asymptomatic candiduria is not recommended (except for   specific populations). Candida isolated in the urine typically   represents colonization. If an indwelling urinary catheter is present  it should be removed or replaced.  *     Urine Studies:   Recent Labs   Lab 08/14/23  0846 08/31/23  2159 10/11/23  0250   COLORU Yellow   < > Yellow   APPEARANCEUA Cloudy*   < > Hazy*   PHUR 7.0   < > 6.0 " "  SPECGRAV 1.010   < > 1.020   PROTEINUA 1+*   < > 1+*   GLUCUA Negative   < > Negative   KETONESU Negative   < > Negative   BILIRUBINUA Negative   < > Negative   OCCULTUA 3+*   < > 3+*   NITRITE Positive*   < > Negative   UROBILINOGEN Negative  --   --    LEUKOCYTESUR 3+*   < > 3+*   RBCUA 9*   < > >100*   WBCUA 87*   < > >100*   BACTERIA Many*   < > Moderate*   SQUAMEPITHEL  --    < > 0   HYALINECASTS 0   < > 0    < > = values in this interval not displayed.     Wound Culture: No results for input(s): "LABAERO" in the last 4320 hours.  Recent Lab Results         10/13/23  0455        Albumin 2.4              ALT 39       Anion Gap 6       aPTT 30.8  Comment: Refer to local heparin nomogram for intensity/dose specific   therapeutic   range.         AST 70       Baso # 0.02       Basophil % 0.6       BILIRUBIN TOTAL 3.1  Comment: For infants and newborns, interpretation of results should be based  on gestational age, weight and in agreement with clinical  observations.    Premature Infant recommended reference ranges:  Up to 24 hours.............<8.0 mg/dL  Up to 48 hours............<12.0 mg/dL  3-5 days..................<15.0 mg/dL  6-29 days.................<15.0 mg/dL         BUN 12       Calcium 9.1       Chloride 105       CO2 19       Creatinine 0.7       Differential Method Automated       eGFR >60.0       Eos # 0.1       Eosinophil % 4.0       Glucose 86       Gran # (ANC) 1.9       Gran % 58.7       Hematocrit 30.6       Hemoglobin 10.1       Immature Grans (Abs) 0.02  Comment: Mild elevation in immature granulocytes is non specific and   can be seen in a variety of conditions including stress response,   acute inflammation, trauma and pregnancy. Correlation with other   laboratory and clinical findings is essential.         Immature Granulocytes 0.6       INR 1.5  Comment: Coumadin Therapy:  2.0 - 3.0 for INR for all indicators except mechanical heart valves  and antiphospholipid syndromes which " should use 2.5 - 3.5.         Lymph # 0.8       Lymph % 25.0       Magnesium  2.1       MCH 35.2       MCHC 33.0              Mono # 0.4       Mono % 11.1       MPV 9.4       nRBC 0       Phosphorus Level 3.0       Platelet Count 76       Potassium 4.6       PROTEIN TOTAL 6.2       Protime 15.3       RBC 2.87       RDW 15.2       Sodium 130       WBC 3.24               Significant Imaging:     Imaging Results              CT Abdomen Pelvis With Contrast (Final result)  Result time 10/11/23 09:19:53      Final result by Heriberto Claudio MD (10/11/23 09:19:53)                   Impression:      Cirrhosis with signs of portal hypertension including splenomegaly and portosystemic collaterals.    Cholelithiasis.    Decreased 9.8 cm left retroperitoneal collection, which could represent a hematoma or abscess.  The previous collection in the left iliacus muscle has resolved.    Large colonic stool burden.  No bowel obstruction or inflammation.    Other findings as described.      Electronically signed by: Heriberto Claudio  Date:    10/11/2023  Time:    09:19               Narrative:    EXAMINATION:  CT ABDOMEN PELVIS WITH CONTRAST    CLINICAL HISTORY:  Abdominal abscess/infection suspected;    TECHNIQUE:  Low dose axial images, sagittal and coronal reformations were obtained from the lung bases to the pubic symphysis following the IV administration of 75 mL of Omnipaque 350 .  Oral contrast was not given.    COMPARISON:  Multiple CTs, most recent 07/10/2023    FINDINGS:  LUNG BASES: Unremarkable.    HEPATOBILIARY: Cirrhotic liver morphology.  No focal hepatic lesions.  The gallbladder is collapsed around multiple gallstones.  No biliary ductal dilatation.    SPLEEN: Splenomegaly measuring 13.0 cm.    PANCREAS: No focal masses or ductal dilatation.    ADRENALS: No adrenal nodules.    KIDNEYS/URETERS: No hydronephrosis or stones.  There are subcentimeter hypodensities in both kidneys, which are too small to characterize  but unchanged.    BLADDER/PELVIC ORGANS: Bladder is decompressed around a Saldaña catheter.  Uterus is unremarkable.  No adnexal mass.    PERITONEUM / RETROPERITONEUM: Diffuse mesenteric fat stranding.  No free fluid.  There is a 9.8 x 5.4 x 4.0 cm peripherally enhancing fluid collection in the left retroperitoneum (2:93), previously 15.1 x 10.8 x 9.4 cm.  The previous collection within the left iliacus muscle has resolved.  No free air.    LYMPH NODES: No lymphadenopathy.    VESSELS: Mild atherosclerosis.  There are embolization coils in the gastroduodenal artery.  Metal artifact degrades evaluation of adjacent structures.  Infrarenal IVC filter in place.  There are venous collaterals including esophageal varices and enlarged superior mesenteric and right gonadal veins.  Portal veins are patent.    GI TRACT: Large colonic stool burden.  No evidence of bowel obstruction or inflammation.  Normal appendix.    BONES AND SOFT TISSUES: Anasarca.  Generalized muscle atrophy.  Diffuse osteopenia.  Postoperative changes from left total hip arthroplasty.  No acute fracture or focal lesion.

## 2023-10-13 NOTE — HPI
Ms. 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 ID consulted for VRE UTI recommendations.    She reports her roommate said rude things to her and it upset her. Mentally she is feeling better today.  Prior to admission she reports having burning with urination. Denies baugh at her facility, though had one on admit. She has been on antibiotics and feels better. Denies fevers. Denies flank pain.    In the ED HDS and afebrile. Ua positive. CT A/P with cirrhosis with signs of portal hypertension including splenomegaly and portosystemic collaterals; cholelithiasis; decreased 9.8 cm left retroperitoneal collection, large colonic stool burden; no bowel obstruction or inflammation. No hydronephrosis or stones. Urine cx + VRE.

## 2023-10-13 NOTE — NURSING
Pt agitated. Threatened to remove IV and refused IV vanc. Dr. Ortiz notified. No new orders, will continue to monitor.

## 2023-10-13 NOTE — CONSULTS
"PharmD Consult Received for:    1) Medication Reconciliation: Completed 10/11 by Lucia Anguiano. Please see note titled "Pharmacy Med Rec."    Additional inquiry regarding patient's glaucoma medications: Contacted patient's listed Ophthalmology clinic. Patient has not not been in clinic recently. No recent glaucoma medications at home pharmacy.    Thank you for the consult. Please reach out with any questions or concerns.    Jered Son, Yogesh, BCPS  Ext. [phone]      "

## 2023-10-13 NOTE — ASSESSMENT & PLAN NOTE
-no acute issues/concerns this admit  -continue home Keppra  -seizure precautions  -monitor and replace magnesium - target >2

## 2023-10-13 NOTE — ASSESSMENT & PLAN NOTE
RIJ thrombus, RUE DVT and RLE femoral DVT diagnosed in June 2023  S/p infrarenal IVC insertion 6/13/23  Not a candidate for anticoagulation due to recurrent GIB and chronic thrombocytopenia  Bilateral lower extremity venous ultrasound on 08/15/2023= Chronic DVT of the right common femoral and saphenofemoral junction. Bilateral inguinal ascites  SCDs

## 2023-10-13 NOTE — ASSESSMENT & PLAN NOTE
· Continue Linezolid 600 mg PO BID x 3 days for cystitis  · Consider baugh removal   · ID will sign off.
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
## 2023-10-13 NOTE — ASSESSMENT & PLAN NOTE
Hepatic encephalopathy  Alcoholic cirrhosis  Alcohol abuse, in remission   Anemia of chronic disease  Thrombocytopenia  -chronic liver disease with LFTs at baseline on admission  -MELD 20  -PEC'd in ED >>> continue  -Psychiatry consult pending  -continue home lithium, zyprexa, and PRN alprazolam   -med and dose adjustments per Psych  -continue home lactulose, rifaximin, and spironolactone   -dose/medication adjustment as appropriate  -trend labs, address/replete electrolytes as indicated  -add multivitamins

## 2023-10-13 NOTE — SUBJECTIVE & OBJECTIVE
Interval History: calm, cooperative and lucid today. Abx changed to Vancomycin to cover enterococcal UTI.    Review of Systems   Constitutional:  Negative for chills and fever.   HENT:  Negative for congestion.    Respiratory:  Negative for cough, chest tightness and shortness of breath.    Cardiovascular:  Negative for chest pain.   Gastrointestinal:  Negative for abdominal pain, constipation, diarrhea, nausea and vomiting.   Genitourinary:  Negative for flank pain.   All other systems reviewed and are negative.    Objective:     Vital Signs (Most Recent):  Temp: 98.7 °F (37.1 °C) (10/12/23 1522)  Pulse: 94 (10/12/23 1522)  Resp: 18 (10/12/23 1522)  BP: (!) 108/53 (10/12/23 1522)  SpO2: 96 % (10/12/23 1522) Vital Signs (24h Range):  Temp:  [97.6 °F (36.4 °C)-98.7 °F (37.1 °C)] 98.7 °F (37.1 °C)  Pulse:  [80-94] 94  Resp:  [18] 18  SpO2:  [96 %-100 %] 96 %  BP: ()/(52-59) 108/53     Weight: 57.2 kg (126 lb)  Body mass index is 23.05 kg/m².    Intake/Output Summary (Last 24 hours) at 10/12/2023 1922  Last data filed at 10/12/2023 1800  Gross per 24 hour   Intake --   Output 1501 ml   Net -1501 ml         Physical Exam  Vitals and nursing note reviewed.   Constitutional:       General: She is awake. She is not in acute distress.     Appearance: She is underweight. She is not ill-appearing, toxic-appearing or diaphoretic.   HENT:      Head: Normocephalic and atraumatic.   Eyes:      General: No scleral icterus.     Extraocular Movements: Extraocular movements intact.   Cardiovascular:      Rate and Rhythm: Normal rate and regular rhythm.      Pulses: Normal pulses.   Pulmonary:      Effort: Pulmonary effort is normal. No respiratory distress.   Abdominal:      General: Abdomen is flat. Bowel sounds are normal.      Palpations: Abdomen is soft.      Tenderness: There is no abdominal tenderness. There is no guarding.   Genitourinary:     Comments: Indwelling baugh catheter - draining clear yellow  urine  Musculoskeletal:         General: No swelling.      Right lower leg: No edema.      Left lower leg: No edema.   Skin:     General: Skin is warm.      Coloration: Skin is not jaundiced.   Neurological:      General: No focal deficit present.      Mental Status: She is alert and oriented to person, place, and time.   Psychiatric:         Behavior: Behavior normal. Behavior is cooperative.         Thought Content: Thought content normal.             Significant Labs: All pertinent labs within the past 24 hours have been reviewed.  CBC:   Recent Labs   Lab 10/10/23  2237 10/12/23  0326   WBC 3.26* 3.35*   HGB 11.4* 9.9*   HCT 33.5* 29.2*   PLT 87* 113*     CMP:   Recent Labs   Lab 10/10/23  2237 10/12/23  0326    131*   K 4.4 4.8    105   CO2 20* 20*   GLU 89 96   BUN 14 10   CREATININE 0.5 0.6   CALCIUM 10.7* 9.0   PROT 6.7 5.6*   ALBUMIN 2.6* 2.2*   BILITOT 3.9* 3.1*   ALKPHOS 157* 167*   AST 57* 70*   ALT 30 37   ANIONGAP 8 6*     Magnesium:   Recent Labs   Lab 10/12/23  0326   MG 1.5*     Urine Culture:   Recent Labs   Lab 10/11/23  0250   LABURIN ENTEROCOCCUS SPECIES  50,000 - 99,999 cfu/ml  Identification and susceptibility pending  *       Significant Imaging: I have reviewed all pertinent imaging results/findings within the past 24 hours.

## 2023-10-13 NOTE — ASSESSMENT & PLAN NOTE
Hepatic encephalopathy  Alcohol abuse, in remission   Anemia of chronic disease  Thrombocytopenia  -chronic liver disease with LFTs at baseline on admission  -MELD 20  -PEC'd in ED >>> continue  -Psychiatry consult pending  -continue home lithium, zyprexa, and PRN alprazolam   -med and dose adjustments per Psych  -continue home lactulose, rifaximin, spironolactone + furosemide    -dose/medication adjustment as appropriate  -trend labs, address/replete electrolytes as indicated  -add multivitamins

## 2023-10-13 NOTE — SUBJECTIVE & OBJECTIVE
Past Medical History:   Diagnosis Date    Addiction to drug     Alcohol abuse     Alcohol abuse, in remission 6/15/2015    14.5 weeks ago; AA weekly    Anemia     Anxiety 6/15/2015    Behavioral problem     Bipolar disorder     Bipolar disorder in remission 6/15/2015    Cirrhosis, Laennec's 6/15/2015    Depression     Encounter for blood transfusion     Epistaxis 6/15/2015    Fatigue     Glaucoma     Hematuria     Hepatic encephalopathy 6/15/2015    Hepatic enlargement     History of psychiatric care     History of psychiatric hospitalization     History of seizure 6/15/2015    1    hx of intentional Tylenol overdose 6/15/2015    2005- situational and hx of bipolar    Hx of psychiatric care     Macrocytic anemia 9/18/2015    6 units PRBC since June 2015    Jeana     Osteoarthritis     Other ascites 6/15/2015    Psychiatric exam requested by authority     Psychiatric problem     Psychosis 9/26/2019    Renal disorder     Seizures     Self-harming behavior     Suicide attempt     Therapy     Thrombocytopenia 6/15/2015       Past Surgical History:   Procedure Laterality Date    COSMETIC SURGERY      EMBOLIZATION N/A 6/11/2023    Procedure: EMBOLIZATION, BLOOD VESSEL;  Surgeon: Debbie Surgeon;  Location: [facility];  Service: Anesthesiology;  Laterality: N/A;    ESOPHAGOGASTRODUODENOSCOPY      ESOPHAGOGASTRODUODENOSCOPY N/A 7/6/2023    Procedure: EGD (ESOPHAGOGASTRODUODENOSCOPY);  Surgeon: Bernice Guillen MD;  Location: [address]);  Service: Endoscopy;  Laterality: N/A;    ESOPHAGOGASTRODUODENOSCOPY N/A 7/11/2023    Procedure: EGD (ESOPHAGOGASTRODUODENOSCOPY);  Surgeon: Rangel Broussard MD;  Location: [address]);  Service: Endoscopy;  Laterality: N/A;       Review of patient's allergies indicates:   Allergen Reactions    Sulfa (sulfonamide antibiotics) Rash    Codeine Nausea And Vomiting       Medications:  Medications Prior to Admission   Medication Sig    ALPRAZolam (XANAX) 0.25 MG tablet Take 1 tablet  (0.25 mg total) by mouth 2 (two) times daily as needed for Anxiety.    ferrous sulfate (FEOSOL) 325 mg (65 mg iron) Tab tablet Take 325 mg by mouth once daily.    furosemide (LASIX) 20 MG tablet Take 20 mg by mouth once daily.    lactulose (CHRONULAC) 20 gram/30 mL Soln Take 45 mLs (30 g total) by mouth 3 (three) times daily. TITRATE TO 3-4 BOWEL MOVEMENTS DAILY.    levetiracetam 500 mg/5 mL (5 mL) Soln Take 10 mLs (1,000 mg total) by mouth 2 (two) times daily.    lithium (LITHOBID) 300 MG CR tablet Take 1 tablet (300 mg total) by mouth every evening.    miconazole nitrate 2% (MICOTIN) 2 % Oint Apply topically 2 (two) times daily. (Patient taking differently: Apply topically 2 (two) times daily. To face as needed)    OLANZapine (ZYPREXA) 5 MG tablet Take 1 tablet (5 mg total) by mouth every evening.    pantoprazole (PROTONIX) 40 mg suspension Take 1 packet (40 mg total) by mouth 2 (two) times daily.    rifAXIMin (XIFAXAN) 550 mg Tab Take 1 tablet (550 mg total) by mouth 2 (two) times daily.    spironolactone (ALDACTONE) 100 MG tablet Take 1 tablet (100 mg total) by mouth once daily.     Antibiotics (From admission, onward)      Start     Stop Route Frequency Ordered    10/13/23 1130  linezolid tablet 600 mg         10/18/23 0859 Oral Every 12 hours 10/13/23 1021    10/11/23 1200  rifAXIMin tablet 550 mg         -- Oral 2 times daily 10/11/23 1048          Antifungals (From admission, onward)      None          Antivirals (From admission, onward)      None             Immunization History   Administered Date(s) Administered    COVID-19, MRNA, LN-S, PF (MODERNA FULL 0.5 ML DOSE) 04/08/2021, 05/06/2021, 01/26/2022    PPD Test 09/20/2019, 07/21/2023       Family History       Problem Relation (Age of Onset)    Alcohol abuse Father, Sister, Paternal Grandfather    Bipolar disorder Father    Hypertension Mother    Suicide Father          Social History     Socioeconomic History    Marital status: Single   Occupational  History    Occupation: Disabled     Employer: DISABLED   Tobacco Use    Smoking status: Never    Smokeless tobacco: Never   Substance and Sexual Activity    Alcohol use: Not Currently     Comment: hx of ETOH abuse with cirrhosis of liver    Drug use: No    Sexual activity: Not Currently   Other Topics Concern    Patient feels they ought to cut down on drinking/drug use No    Patient annoyed by others criticizing their drinking/drug use No    Patient has felt bad or guilty about drinking/drug use No    Patient has had a drink/used drugs as an eye opener in the AM No   Social History Narrative    Pt has 1 older and 1 younger sister.  Her father committed suicide when she was 18.  She has a [employer] degree and worked as an , but she has been disabled since age 40.  She never  and has no children.  She is not dating and claims no current friends.  She currently lives with her mother along with her pet dog.  She denies any hobbies and says she is not Denominational.     Social Determinants of Health     Financial Resource Strain: Low Risk  (8/15/2023)    Overall Financial Resource Strain (CARDIA)     Difficulty of Paying Living Expenses: Not hard at all   Food Insecurity: No Food Insecurity (8/15/2023)    Hunger Vital Sign     Worried About Running Out of Food in the Last Year: Never true     Ran Out of Food in the Last Year: Never true   Transportation Needs: No Transportation Needs (8/15/2023)    PRAPARE - Transportation     Lack of Transportation (Medical): No     Lack of Transportation (Non-Medical): No   Physical Activity: Inactive (8/15/2023)    Exercise Vital Sign     Days of Exercise per Week: 0 days     Minutes of Exercise per Session: 0 min   Stress: Stress Concern Present (8/15/2023)    [employer] - Occupational Stress Questionnaire     Feeling of Stress : Very much   Social Connections: Unknown (8/15/2023)    Social Connection and Isolation Panel [NHANES]     Frequency of  Communication with Friends and Family: Patient refused     Frequency of Social Gatherings with Friends and Family: Patient refused     Attends Roman [employer] Services: Never     Active Member of Clubs or Organizations: No     Attends Club or Organization Meetings: Never     Marital Status: Never    Housing Stability: Low Risk  (8/15/2023)    Housing Stability Vital Sign     Unable to Pay for Housing in the Last Year: No     Number of Places Lived in the Last Year: 1     Unstable Housing in the Last Year: No     Review of Systems   Constitutional:  Negative for chills and fever.   HENT:  Negative for congestion.    Respiratory:  Negative for cough, chest tightness and shortness of breath.    Cardiovascular:  Negative for chest pain.   Gastrointestinal:  Negative for abdominal pain, constipation, diarrhea, nausea and vomiting.   Genitourinary:  Positive for difficulty urinating and dysuria. Negative for flank pain.   Psychiatric/Behavioral:  Negative for agitation and confusion.    All other systems reviewed and are negative.    Objective:     Vital Signs (Most Recent):  Temp: 99.9 °F (37.7 °C) (10/13/23 1044)  Pulse: 85 (10/13/23 1044)  Resp: 18 (10/13/23 1044)  BP: 136/60 (10/13/23 1044)  SpO2: 99 % (10/13/23 1044) Vital Signs (24h Range):  Temp:  [97.4 °F (36.3 °C)-99.9 °F (37.7 °C)] 99.9 °F (37.7 °C)  Pulse:  [73-97] 85  Resp:  [16-18] 18  SpO2:  [94 %-99 %] 99 %  BP: (108-136)/(51-62) 136/60     Weight: 57.2 kg (126 lb)  Body mass index is 23.05 kg/m².    Estimated Creatinine Clearance: 70.1 mL/min (based on SCr of 0.7 mg/dL).     Physical Exam  Constitutional:       General: She is not in acute distress.     Appearance: Normal appearance. She is well-developed. She is not ill-appearing or diaphoretic.   HENT:      Head: Normocephalic and atraumatic.      Right Ear: External ear normal.      Left Ear: External ear normal.      Nose: Nose normal.   Eyes:      General: No scleral icterus.        Right eye: No  "discharge.         Left eye: No discharge.      Extraocular Movements: Extraocular movements intact.      Conjunctiva/sclera: Conjunctivae normal.   Pulmonary:      Effort: Pulmonary effort is normal. No respiratory distress.      Breath sounds: No stridor.   Musculoskeletal:         General: Normal range of motion.   Skin:     Findings: No erythema or rash.   Neurological:      General: No focal deficit present.      Mental Status: She is alert and oriented to person, place, and time. Mental status is at baseline.      Cranial Nerves: No cranial nerve deficit.   Psychiatric:         Mood and Affect: Mood normal.         Behavior: Behavior normal.         Thought Content: Thought content normal.         Judgment: Judgment normal.          Significant Labs: Blood Culture:   Recent Labs   Lab 09/01/23  0054 09/12/23  1844 09/12/23  1849 09/16/23 2208 09/16/23 2209   LABBLOO No growth after 5 days.  No growth after 5 days. No growth after 5 days. Gram stain aer bottle:  Gram positive rods  Results called to and read back by: Sindy Duran MD 09/16/2023  14:26  LACTOBACILLUS SPECIES  Further identified as Lactobacillus rhamnosus  * No growth after 5 days. No growth after 5 days.     BMP:   Recent Labs   Lab 10/13/23  0455   GLU 86   *   K 4.6      CO2 19*   BUN 12   CREATININE 0.7   CALCIUM 9.1   MG 2.1     CBC:   Recent Labs   Lab 10/12/23  0326 10/13/23  0455   WBC 3.35* 3.24*   HGB 9.9* 10.1*   HCT 29.2* 30.6*   * 76*     CMP:   Recent Labs   Lab 10/12/23  0326 10/13/23  0455   * 130*   K 4.8 4.6    105   CO2 20* 19*   GLU 96 86   BUN 10 12   CREATININE 0.6 0.7   CALCIUM 9.0 9.1   PROT 5.6* 6.2   ALBUMIN 2.2* 2.4*   BILITOT 3.1* 3.1*   ALKPHOS 167* 209*   AST 70* 70*   ALT 37 39   ANIONGAP 6* 6*     Fungus Culture (Blood or Bone Marrow): No results for input(s): "FUNGUSCULTUR" in the last 4320 hours.  Microbiology Results (last 7 days)       Procedure Component Value Units Date/Time    " "Urine culture [[id number]]  (Abnormal)  (Susceptibility) Collected: 10/11/23 0250    Order Status: Completed Specimen: Urine Updated: 10/13/23 1355     Urine Culture, Routine ENTEROCOCCUS FAECIUM VRE  50,000 - 99,999 cfu/ml        CANDIDA TROPICALIS  10,000 - 49,999 cfu/ml  Treatment of asymptomatic candiduria is not recommended (except for   specific populations). Candida isolated in the urine typically   represents colonization. If an indwelling urinary catheter is present  it should be removed or replaced.      Narrative:      Specimen Source->Urine          Procalcitonin: No results for input(s): "PROCAL" in the last 48 hours.  Quantiferon: No results for input(s): "NIL", "TBAG", "TBAGNIL", "MITOGENNIL", "TBGOLD" in the last 48 hours.  Respiratory Culture: No results for input(s): "GSRESP", "RESPIRATORYC" in the last 4320 hours.  Urine Culture:   Recent Labs   Lab 08/14/23 0846 08/31/23 2159 09/12/23 2124 09/17/23 0007 10/11/23  0250   LABURIN ESCHERICHIA COLI  >100,000 cfu/ml  * Multiple organisms isolated. None in predominance.  Repeat if  clinically necessary. ESCHERICHIA COLI  >100,000 cfu/ml  No other significant isolate  * Multiple organisms isolated. None in predominance.  Repeat if  clinically necessary. ENTEROCOCCUS FAECIUM VRE  50,000 - 99,999 cfu/ml  *  CANDIDA TROPICALIS  10,000 - 49,999 cfu/ml  Treatment of asymptomatic candiduria is not recommended (except for   specific populations). Candida isolated in the urine typically   represents colonization. If an indwelling urinary catheter is present  it should be removed or replaced.  *     Urine Studies:   Recent Labs   Lab 08/14/23  0846 08/31/23  2159 10/11/23  0250   COLORU Yellow   < > Yellow   APPEARANCEUA Cloudy*   < > Hazy*   PHUR 7.0   < > 6.0   SPECGRAV 1.010   < > 1.020   PROTEINUA 1+*   < > 1+*   GLUCUA Negative   < > Negative   KETONESU Negative   < > Negative   BILIRUBINUA Negative   < > Negative   OCCULTUA 3+*   < > 3+*   NITRITE " "Positive*   < > Negative   UROBILINOGEN Negative  --   --    LEUKOCYTESUR 3+*   < > 3+*   RBCUA 9*   < > >100*   WBCUA 87*   < > >100*   BACTERIA Many*   < > Moderate*   SQUAMEPITHEL  --    < > 0   HYALINECASTS 0   < > 0    < > = values in this interval not displayed.     Wound Culture: No results for input(s): "LABAERO" in the last 4320 hours.  Recent Lab Results         10/13/23  0455        Albumin 2.4              ALT 39       Anion Gap 6       aPTT 30.8  Comment: Refer to local heparin nomogram for intensity/dose specific   therapeutic   range.         AST 70       Baso # 0.02       Basophil % 0.6       BILIRUBIN TOTAL 3.1  Comment: For infants and newborns, interpretation of results should be based  on gestational age, weight and in agreement with clinical  observations.    Premature Infant recommended reference ranges:  Up to 24 hours.............<8.0 mg/dL  Up to 48 hours............<12.0 mg/dL  3-5 days..................<15.0 mg/dL  6-29 days.................<15.0 mg/dL         BUN 12       Calcium 9.1       Chloride 105       CO2 19       Creatinine 0.7       Differential Method Automated       eGFR >60.0       Eos # 0.1       Eosinophil % 4.0       Glucose 86       Gran # (ANC) 1.9       Gran % 58.7       Hematocrit 30.6       Hemoglobin 10.1       Immature Grans (Abs) 0.02  Comment: Mild elevation in immature granulocytes is non specific and   can be seen in a variety of conditions including stress response,   acute inflammation, trauma and pregnancy. Correlation with other   laboratory and clinical findings is essential.         Immature Granulocytes 0.6       INR 1.5  Comment: Coumadin Therapy:  2.0 - 3.0 for INR for all indicators except mechanical heart valves  and antiphospholipid syndromes which should use 2.5 - 3.5.         Lymph # 0.8       Lymph % 25.0       Magnesium  2.1       MCH 35.2       MCHC 33.0              Mono # 0.4       Mono % 11.1       MPV 9.4       nRBC 0       " Phosphorus Level 3.0       Platelet Count 76       Potassium 4.6       PROTEIN TOTAL 6.2       Protime 15.3       RBC 2.87       RDW 15.2       Sodium 130       WBC 3.24               Significant Imaging:     Imaging Results              CT Abdomen Pelvis With Contrast (Final result)  Result time 10/11/23 09:19:53      Final result by Heriberto Claudio MD (10/11/23 09:19:53)                   Impression:      Cirrhosis with signs of portal hypertension including splenomegaly and portosystemic collaterals.    Cholelithiasis.    Decreased 9.8 cm left retroperitoneal collection, which could represent a hematoma or abscess.  The previous collection in the left iliacus muscle has resolved.    Large colonic stool burden.  No bowel obstruction or inflammation.    Other findings as described.      Electronically signed by: Heriberto Claudio  Date:    10/11/2023  Time:    09:19               Narrative:    EXAMINATION:  CT ABDOMEN PELVIS WITH CONTRAST    CLINICAL HISTORY:  Abdominal abscess/infection suspected;    TECHNIQUE:  Low dose axial images, sagittal and coronal reformations were obtained from the lung bases to the pubic symphysis following the IV administration of 75 mL of Omnipaque 350 .  Oral contrast was not given.    COMPARISON:  Multiple CTs, most recent 07/10/2023    FINDINGS:  LUNG BASES: Unremarkable.    HEPATOBILIARY: Cirrhotic liver morphology.  No focal hepatic lesions.  The gallbladder is collapsed around multiple gallstones.  No biliary ductal dilatation.    SPLEEN: Splenomegaly measuring 13.0 cm.    PANCREAS: No focal masses or ductal dilatation.    ADRENALS: No adrenal nodules.    KIDNEYS/URETERS: No hydronephrosis or stones.  There are subcentimeter hypodensities in both kidneys, which are too small to characterize but unchanged.    BLADDER/PELVIC ORGANS: Bladder is decompressed around a Saldaña catheter.  Uterus is unremarkable.  No adnexal mass.    PERITONEUM / RETROPERITONEUM: Diffuse mesenteric fat  stranding.  No free fluid.  There is a 9.8 x 5.4 x 4.0 cm peripherally enhancing fluid collection in the left retroperitoneum (2:93), previously 15.1 x 10.8 x 9.4 cm.  The previous collection within the left iliacus muscle has resolved.  No free air.    LYMPH NODES: No lymphadenopathy.    VESSELS: Mild atherosclerosis.  There are embolization coils in the gastroduodenal artery.  Metal artifact degrades evaluation of adjacent structures.  Infrarenal IVC filter in place.  There are venous collaterals including esophageal varices and enlarged superior mesenteric and right gonadal veins.  Portal veins are patent.    GI TRACT: Large colonic stool burden.  No evidence of bowel obstruction or inflammation.  Normal appendix.    BONES AND SOFT TISSUES: Anasarca.  Generalized muscle atrophy.  Diffuse osteopenia.  Postoperative changes from left total hip arthroplasty.  No acute fracture or focal lesion.

## 2023-10-14 LAB
ALBUMIN SERPL BCP-MCNC: 2.4 G/DL (ref 3.5–5.2)
ALP SERPL-CCNC: 182 U/L (ref 55–135)
ALT SERPL W/O P-5'-P-CCNC: 36 U/L (ref 10–44)
ANION GAP SERPL CALC-SCNC: 7 MMOL/L (ref 8–16)
AST SERPL-CCNC: 61 U/L (ref 10–40)
BASOPHILS # BLD AUTO: 0.02 K/UL (ref 0–0.2)
BASOPHILS NFR BLD: 0.7 % (ref 0–1.9)
BILIRUB SERPL-MCNC: 3.6 MG/DL (ref 0.1–1)
BUN SERPL-MCNC: 11 MG/DL (ref 6–20)
CALCIUM SERPL-MCNC: 8.7 MG/DL (ref 8.7–10.5)
CHLORIDE SERPL-SCNC: 105 MMOL/L (ref 95–110)
CO2 SERPL-SCNC: 18 MMOL/L (ref 23–29)
CREAT SERPL-MCNC: 0.5 MG/DL (ref 0.5–1.4)
DIFFERENTIAL METHOD: ABNORMAL
EOSINOPHIL # BLD AUTO: 0.1 K/UL (ref 0–0.5)
EOSINOPHIL NFR BLD: 5 % (ref 0–8)
ERYTHROCYTE [DISTWIDTH] IN BLOOD BY AUTOMATED COUNT: 15.5 % (ref 11.5–14.5)
EST. GFR  (NO RACE VARIABLE): >60 ML/MIN/1.73 M^2
GLUCOSE SERPL-MCNC: 85 MG/DL (ref 70–110)
HCT VFR BLD AUTO: 30.8 % (ref 37–48.5)
HGB BLD-MCNC: 10.5 G/DL (ref 12–16)
IMM GRANULOCYTES # BLD AUTO: 0.01 K/UL (ref 0–0.04)
IMM GRANULOCYTES NFR BLD AUTO: 0.4 % (ref 0–0.5)
LYMPHOCYTES # BLD AUTO: 0.7 K/UL (ref 1–4.8)
LYMPHOCYTES NFR BLD: 24.1 % (ref 18–48)
MAGNESIUM SERPL-MCNC: 1.8 MG/DL (ref 1.6–2.6)
MCH RBC QN AUTO: 36 PG (ref 27–31)
MCHC RBC AUTO-ENTMCNC: 34.1 G/DL (ref 32–36)
MCV RBC AUTO: 106 FL (ref 82–98)
MONOCYTES # BLD AUTO: 0.4 K/UL (ref 0.3–1)
MONOCYTES NFR BLD: 12.6 % (ref 4–15)
NEUTROPHILS # BLD AUTO: 1.6 K/UL (ref 1.8–7.7)
NEUTROPHILS NFR BLD: 57.2 % (ref 38–73)
NRBC BLD-RTO: 0 /100 WBC
PLATELET # BLD AUTO: 76 K/UL (ref 150–450)
PMV BLD AUTO: 9.6 FL (ref 9.2–12.9)
POTASSIUM SERPL-SCNC: 4.5 MMOL/L (ref 3.5–5.1)
PROT SERPL-MCNC: 6.2 G/DL (ref 6–8.4)
RBC # BLD AUTO: 2.92 M/UL (ref 4–5.4)
SODIUM SERPL-SCNC: 130 MMOL/L (ref 136–145)
WBC # BLD AUTO: 2.78 K/UL (ref 3.9–12.7)

## 2023-10-14 PROCEDURE — 83735 ASSAY OF MAGNESIUM: CPT | Performed by: HOSPITALIST

## 2023-10-14 PROCEDURE — 90792 PSYCH DIAG EVAL W/MED SRVCS: CPT | Mod: ,,, | Performed by: PSYCHIATRY & NEUROLOGY

## 2023-10-14 PROCEDURE — 99232 PR SUBSEQUENT HOSPITAL CARE,LEVL II: ICD-10-PCS | Mod: 95,,, | Performed by: HOSPITALIST

## 2023-10-14 PROCEDURE — 85025 COMPLETE CBC W/AUTO DIFF WBC: CPT | Performed by: HOSPITALIST

## 2023-10-14 PROCEDURE — 36415 COLL VENOUS BLD VENIPUNCTURE: CPT | Performed by: HOSPITALIST

## 2023-10-14 PROCEDURE — 25000003 PHARM REV CODE 250: Performed by: HOSPITALIST

## 2023-10-14 PROCEDURE — 25000003 PHARM REV CODE 250: Performed by: NURSE PRACTITIONER

## 2023-10-14 PROCEDURE — 80053 COMPREHEN METABOLIC PANEL: CPT | Performed by: HOSPITALIST

## 2023-10-14 PROCEDURE — 99232 SBSQ HOSP IP/OBS MODERATE 35: CPT | Mod: 95,,, | Performed by: HOSPITALIST

## 2023-10-14 PROCEDURE — 90792 PR PSYCHIATRIC DIAGNOSTIC EVALUATION W/MEDICAL SERVICES: ICD-10-PCS | Mod: ,,, | Performed by: PSYCHIATRY & NEUROLOGY

## 2023-10-14 PROCEDURE — G0378 HOSPITAL OBSERVATION PER HR: HCPCS

## 2023-10-14 RX ORDER — SODIUM BICARBONATE 325 MG/1
650 TABLET ORAL
Status: DISCONTINUED | OUTPATIENT
Start: 2023-10-14 | End: 2023-10-15

## 2023-10-14 RX ORDER — TIMOLOL MALEATE 5 MG/ML
1 SOLUTION/ DROPS OPHTHALMIC 2 TIMES DAILY
Status: DISCONTINUED | OUTPATIENT
Start: 2023-10-14 | End: 2023-10-18 | Stop reason: HOSPADM

## 2023-10-14 RX ORDER — BRIMONIDINE TARTRATE AND TIMOLOL MALEATE 2; 5 MG/ML; MG/ML
1 SOLUTION OPHTHALMIC 2 TIMES DAILY
COMMUNITY

## 2023-10-14 RX ORDER — BRIMONIDINE TARTRATE 1.5 MG/ML
1 SOLUTION/ DROPS OPHTHALMIC 2 TIMES DAILY
Status: DISCONTINUED | OUTPATIENT
Start: 2023-10-14 | End: 2023-10-18 | Stop reason: HOSPADM

## 2023-10-14 RX ADMIN — SODIUM BICARBONATE 650 MG: 325 TABLET ORAL at 12:10

## 2023-10-14 RX ADMIN — RIFAXIMIN 550 MG: 550 TABLET ORAL at 08:10

## 2023-10-14 RX ADMIN — LEVETIRACETAM 1000 MG: 100 SOLUTION ORAL at 08:10

## 2023-10-14 RX ADMIN — ACETAMINOPHEN 650 MG: 325 TABLET ORAL at 08:10

## 2023-10-14 RX ADMIN — TIMOLOL MALEATE 1 DROP: 5 SOLUTION/ DROPS OPHTHALMIC at 10:10

## 2023-10-14 RX ADMIN — Medication 400 MG: at 08:10

## 2023-10-14 RX ADMIN — PANTOPRAZOLE SODIUM 40 MG: 40 GRANULE, DELAYED RELEASE ORAL at 08:10

## 2023-10-14 RX ADMIN — LINEZOLID 600 MG: 600 TABLET, FILM COATED ORAL at 08:10

## 2023-10-14 RX ADMIN — ACETAMINOPHEN 650 MG: 325 TABLET ORAL at 12:10

## 2023-10-14 RX ADMIN — LITHIUM CARBONATE 300 MG: 300 TABLET, FILM COATED, EXTENDED RELEASE ORAL at 08:10

## 2023-10-14 RX ADMIN — BRIMONIDINE TARTRATE 1 DROP: 1.5 SOLUTION OPHTHALMIC at 10:10

## 2023-10-14 RX ADMIN — LACTULOSE 30 G: 20 SOLUTION ORAL at 08:10

## 2023-10-14 RX ADMIN — SPIRONOLACTONE 100 MG: 25 TABLET ORAL at 08:10

## 2023-10-14 RX ADMIN — OLANZAPINE 5 MG: 5 TABLET, FILM COATED ORAL at 08:10

## 2023-10-14 RX ADMIN — ALPRAZOLAM 0.25 MG: 0.25 TABLET ORAL at 08:10

## 2023-10-14 RX ADMIN — FERROUS SULFATE TAB 325 MG (65 MG ELEMENTAL FE) 1 EACH: 325 (65 FE) TAB at 08:10

## 2023-10-14 RX ADMIN — FUROSEMIDE 20 MG: 20 TABLET ORAL at 08:10

## 2023-10-14 RX ADMIN — THERA TABS 1 TABLET: TAB at 08:10

## 2023-10-14 NOTE — ASSESSMENT & PLAN NOTE
Chronic thrombocytopenia associated with cirrhosis.  plts 87K on admit-> 76K today  Cbc daily while on Zyvox.  SCDs, no chemical ppx 2/2 h/o recurrent GIB.

## 2023-10-14 NOTE — ASSESSMENT & PLAN NOTE
Patient has Abnormal Magnesium: hypomagnesemia. Will continue to monitor electrolytes closely. Will replace the affected electrolytes and repeat labs to be done after interventions completed. The patient's magnesium results have been reviewed and are listed below.  Recent Labs   Lab 10/13/23  0455   MG 2.1      10/12: mag sulfate 2g IV x1, then oral magnesium 400 qd   -repeat level with morning labs.

## 2023-10-14 NOTE — ASSESSMENT & PLAN NOTE
Acute metabolic encephalopathy - RESOLVED  Hepatic encephalopathy - RESOLVED  History of recurrent UTIs with current admission for VRE  Chronic indwelling baugh catheter for urinary retention  -increased agitation and aggression, refusing home medications    -HDS and afebrile on admit  -admission labs:   -CBC with WBC 3, H/H 11.4/33.5, platelets 87 (all at or better than baseline). Chemistry with stable renal function, glucose 89, K 4.4, alk phose 157, TBili 3.9, AST 57, ALT 30 (all similar to baseline). Ammonia 52. TSH 0.115/FT4 1.12. PT/INR pending. Lithium level 0.1. Serum acetaminophen <3.0. Serum alcohol <10. UDS + benzos. UA concerning for infection.   -admission micro: VRE+, Zyvox PO started with ID consulted-> 3 day rx. Monitor cbc daily while admitted.  -admission diagnostics:   -CT A/P with cirrhosis with signs of portal hypertension including splenomegaly and portosystemic collaterals; cholelithiasis; decreased 9.8 cm left retroperitoneal collection, which could represent a hematoma or abscess; large colonic stool burden; no bowel obstruction or inflammation.  -PEC'd in ED -> continue PEC hold due to grave disability,  evaluation tomorrow. CM informed and d/w Psychiatry resident on call  -continue home lithium, zyprexa, and PRN alprazolam   -med and dose adjustments per Psych  -continue home lactulose- reduce dose to 30g daily (goal 2-3 soft bms daily), rifaximin, and spironolactone  -delirium and fall precautions

## 2023-10-14 NOTE — PLAN OF CARE
Problem: Adult Inpatient Plan of Care  Goal: Plan of Care Review  Outcome: Ongoing, Progressing  Goal: Patient-Specific Goal (Individualized)  Outcome: Ongoing, Progressing  Goal: Absence of Hospital-Acquired Illness or Injury  Outcome: Ongoing, Progressing  Goal: Optimal Comfort and Wellbeing  Outcome: Ongoing, Progressing  Goal: Readiness for Transition of Care  Outcome: Ongoing, Progressing   Pt aao x 4. VS remain stable. Pt remains free from falls and injuries. Questions and concerns addressed and answered. Medication compliance. Bed low, wheels locked, side rails up x 2, call light within reach.  1:1 sitter remains at bedside per PEC protocol.
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
## 2023-10-15 LAB
ALBUMIN SERPL BCP-MCNC: 2.2 G/DL (ref 3.5–5.2)
ALP SERPL-CCNC: 190 U/L (ref 55–135)
ALT SERPL W/O P-5'-P-CCNC: 32 U/L (ref 10–44)
ANION GAP SERPL CALC-SCNC: 6 MMOL/L (ref 8–16)
AST SERPL-CCNC: 57 U/L (ref 10–40)
BASOPHILS # BLD AUTO: 0.02 K/UL (ref 0–0.2)
BASOPHILS NFR BLD: 0.6 % (ref 0–1.9)
BILIRUB SERPL-MCNC: 3.6 MG/DL (ref 0.1–1)
BUN SERPL-MCNC: 14 MG/DL (ref 6–20)
CALCIUM SERPL-MCNC: 8.6 MG/DL (ref 8.7–10.5)
CHLORIDE SERPL-SCNC: 107 MMOL/L (ref 95–110)
CO2 SERPL-SCNC: 16 MMOL/L (ref 23–29)
CREAT SERPL-MCNC: 0.6 MG/DL (ref 0.5–1.4)
DIFFERENTIAL METHOD: ABNORMAL
EOSINOPHIL # BLD AUTO: 0.2 K/UL (ref 0–0.5)
EOSINOPHIL NFR BLD: 4.3 % (ref 0–8)
ERYTHROCYTE [DISTWIDTH] IN BLOOD BY AUTOMATED COUNT: 15.1 % (ref 11.5–14.5)
EST. GFR  (NO RACE VARIABLE): >60 ML/MIN/1.73 M^2
GLUCOSE SERPL-MCNC: 163 MG/DL (ref 70–110)
HCT VFR BLD AUTO: 31.3 % (ref 37–48.5)
HGB BLD-MCNC: 10.2 G/DL (ref 12–16)
IMM GRANULOCYTES # BLD AUTO: 0 K/UL (ref 0–0.04)
IMM GRANULOCYTES NFR BLD AUTO: 0 % (ref 0–0.5)
LYMPHOCYTES # BLD AUTO: 0.6 K/UL (ref 1–4.8)
LYMPHOCYTES NFR BLD: 17.9 % (ref 18–48)
MAGNESIUM SERPL-MCNC: 2 MG/DL (ref 1.6–2.6)
MCH RBC QN AUTO: 35.3 PG (ref 27–31)
MCHC RBC AUTO-ENTMCNC: 32.6 G/DL (ref 32–36)
MCV RBC AUTO: 108 FL (ref 82–98)
MONOCYTES # BLD AUTO: 0.3 K/UL (ref 0.3–1)
MONOCYTES NFR BLD: 8.4 % (ref 4–15)
NEUTROPHILS # BLD AUTO: 2.4 K/UL (ref 1.8–7.7)
NEUTROPHILS NFR BLD: 68.8 % (ref 38–73)
NRBC BLD-RTO: 0 /100 WBC
PLATELET # BLD AUTO: 80 K/UL (ref 150–450)
PMV BLD AUTO: 9.3 FL (ref 9.2–12.9)
POTASSIUM SERPL-SCNC: 5.4 MMOL/L (ref 3.5–5.1)
PROT SERPL-MCNC: 5.7 G/DL (ref 6–8.4)
RBC # BLD AUTO: 2.89 M/UL (ref 4–5.4)
SODIUM SERPL-SCNC: 129 MMOL/L (ref 136–145)
WBC # BLD AUTO: 3.47 K/UL (ref 3.9–12.7)

## 2023-10-15 PROCEDURE — 99232 SBSQ HOSP IP/OBS MODERATE 35: CPT | Mod: 95,,, | Performed by: HOSPITALIST

## 2023-10-15 PROCEDURE — 80053 COMPREHEN METABOLIC PANEL: CPT | Performed by: HOSPITALIST

## 2023-10-15 PROCEDURE — 25000003 PHARM REV CODE 250: Performed by: HOSPITALIST

## 2023-10-15 PROCEDURE — 99232 PR SUBSEQUENT HOSPITAL CARE,LEVL II: ICD-10-PCS | Mod: 95,,, | Performed by: HOSPITALIST

## 2023-10-15 PROCEDURE — 83735 ASSAY OF MAGNESIUM: CPT | Performed by: HOSPITALIST

## 2023-10-15 PROCEDURE — 36415 COLL VENOUS BLD VENIPUNCTURE: CPT | Performed by: HOSPITALIST

## 2023-10-15 PROCEDURE — 85025 COMPLETE CBC W/AUTO DIFF WBC: CPT | Performed by: HOSPITALIST

## 2023-10-15 PROCEDURE — 25000003 PHARM REV CODE 250: Performed by: NURSE PRACTITIONER

## 2023-10-15 PROCEDURE — 99232 PR SUBSEQUENT HOSPITAL CARE,LEVL II: ICD-10-PCS | Mod: ,,, | Performed by: PSYCHIATRY & NEUROLOGY

## 2023-10-15 PROCEDURE — 99232 SBSQ HOSP IP/OBS MODERATE 35: CPT | Mod: ,,, | Performed by: PSYCHIATRY & NEUROLOGY

## 2023-10-15 PROCEDURE — 11000001 HC ACUTE MED/SURG PRIVATE ROOM

## 2023-10-15 RX ADMIN — PANTOPRAZOLE SODIUM 40 MG: 40 GRANULE, DELAYED RELEASE ORAL at 08:10

## 2023-10-15 RX ADMIN — SODIUM BICARBONATE 650 MG: 325 TABLET ORAL at 12:10

## 2023-10-15 RX ADMIN — THERA TABS 1 TABLET: TAB at 08:10

## 2023-10-15 RX ADMIN — FERROUS SULFATE TAB 325 MG (65 MG ELEMENTAL FE) 1 EACH: 325 (65 FE) TAB at 08:10

## 2023-10-15 RX ADMIN — TIMOLOL MALEATE 1 DROP: 5 SOLUTION/ DROPS OPHTHALMIC at 08:10

## 2023-10-15 RX ADMIN — BRIMONIDINE TARTRATE 1 DROP: 1.5 SOLUTION OPHTHALMIC at 08:10

## 2023-10-15 RX ADMIN — SODIUM ZIRCONIUM CYCLOSILICATE 10 G: 10 POWDER, FOR SUSPENSION ORAL at 05:10

## 2023-10-15 RX ADMIN — Medication 400 MG: at 08:10

## 2023-10-15 RX ADMIN — TIMOLOL MALEATE 1 DROP: 5 SOLUTION/ DROPS OPHTHALMIC at 12:10

## 2023-10-15 RX ADMIN — LITHIUM CARBONATE 300 MG: 300 TABLET, FILM COATED, EXTENDED RELEASE ORAL at 08:10

## 2023-10-15 RX ADMIN — RIFAXIMIN 550 MG: 550 TABLET ORAL at 08:10

## 2023-10-15 RX ADMIN — LINEZOLID 600 MG: 600 TABLET, FILM COATED ORAL at 08:10

## 2023-10-15 RX ADMIN — SODIUM BICARBONATE 650 MG: 325 TABLET ORAL at 05:10

## 2023-10-15 RX ADMIN — SPIRONOLACTONE 100 MG: 25 TABLET ORAL at 08:10

## 2023-10-15 RX ADMIN — LEVETIRACETAM 1000 MG: 100 SOLUTION ORAL at 08:10

## 2023-10-15 RX ADMIN — BRIMONIDINE TARTRATE 1 DROP: 1.5 SOLUTION OPHTHALMIC at 12:10

## 2023-10-15 RX ADMIN — OLANZAPINE 5 MG: 5 TABLET, FILM COATED ORAL at 08:10

## 2023-10-15 RX ADMIN — FUROSEMIDE 20 MG: 20 TABLET ORAL at 08:10

## 2023-10-15 NOTE — CONSULTS
"CONSULTATION LIAISON PSYCHIATRY INITIAL EVALUATION    Patient Name: Marely Hamilton  MRN: [id number]  Patient Class: OP- Observation  Admission Date: 10/10/2023  Attending Physician: Kena Esqueda MD      HPI:   Marely Hamilton is a 58 y.o. female with past psychiatric history of bipolar disorder, alcohol use disorder & past pertinent medical history of seizure disorder, alcohol induced hepatic cirrhosis presents to the ED/admitted to the hospital for Urinary tract infection associated with indwelling urethral catheter    Psychiatry consulted for "PEC'd for combative behavior - needs psych eval"    On psych exam, prior to interview patient resting comfortably in bed. Patient alert, oriented, and amenable to interview at this time. Denies any acute complaints or concerns. Regarding prior refusal of vancomycin and lactulose, patient reported that she was in so much pain 2/2 her catheter that she was unable to participate in meaningful conversation. Reports that since she is no longer in such pain she is amenable to receiving medications. When asked about prior aggressive behavior demonstrated toward staff (previous notes reports patient was aggressive w/ resident while in ED), patient appeared remorseful and apologetic stating "I didn't mean to hurt anybody."    Denies thoughts of self harm, or harm towards others. States that prior to admission she was residing at [facility].       Collateral with patient's permission:   Spoke with patients Zaria andrews @ [phone]    States that she last spoke to patient on day of admission and states that she sounded altered at the time. Reports eventual plan to move patient to [facility] where sister is located, so that she can reside in a nursing home while being closer to family. States that she requires 24 hour care at baseline and is unable to care for herself. Collateral denies any questions at this time, but is interested in being contacted " "prior to discharge.      Medical Review of Systems:  Pertinent items are noted in HPI.    Psychiatric Review of Systems (is patient experiencing or having changes in):  sleep: no  appetite: no  weight: no  energy/anergy: yes  interest/pleasure/anhedonia: no  somatic symptoms: yes  libido: no  anxiety/panic: no  guilty/hopelessness: no  concentration: no  Jeana:no  Psychosis: no  Trauma: no  S.I.B.s/risky behavior: no    Past Psychiatric History:  Previous Medication Trials: yes  Previous Psychiatric Hospitalizations: yes, multiple   Previous Suicide Attempts: yes  History of Violence: denies   Outpatient Psychiatrist: yes, Dr Coates  Family Psychiatric History: yes, states father w/ unclear history bipolar disorder    Substance Abuse History (with emphasis over the last 12 months):  Recreational Drugs:  denies  Use of Alcohol:  reports history of heavy alcohol consumption with resultant alcohol induced cirrhosis, reports being "clean and sober", denies current consumption    Tobacco Use:no  Rehab History:yes    Social History:  Marital Status: not   Children: 0  Employment Status/Info: on disability, previously worked as    :no  Education: post college graduate work or degree, reports receiving EDIN from [facility] Ed: no  Housing Status: [facility]  Access to gun: no  Psychosocial Stressors: family, health, and drug and alcohol  Functioning Relationships: good support system    Legal History:  Past Charges/Incarcerations: Reports DUI  Pending charges:no    Mental Status Exam:  General Appearance: appears stated age, well developed and nourished, adequately groomed and appropriately dressed, in no acute distress  Behavior: normal; cooperative; reasonably friendly, pleasant, and polite; appropriate eye-contact; under good behavioral control  Involuntary Movements and Motor Activity: no abnormal involuntary movements noted; no tics, no tremors, no akathisia, no dystonia, no " "evidence of tardive dyskinesia; no psychomotor agitation or retardation  Gait and Station: unable to assess - patient lying down or seated  Speech and Language: slowed, monotone  Mood: "Fine"  Affect: blunted  Thought Process and Associations: linear, goal-directed, abstract (proverbs & similarities)  Thought Content and Perceptions:: no suicidal ideation, no homicidal ideation, no auditory hallucinations, no visual hallucinations  Sensorium and Orientation: oriented fully (to person, place, and time)  Recent and Remote Memory: grossly intact  Attention and Concentration: grossly intact, attentive to the conversation and not readily distractible  Fund of Knowledge: intact  Insight: intact  Judgment: intact    CAM ICU positive? no      ASSESSMENT & RECOMMENDATIONS     BIPOLAR I  PSYCH MEDICATIONS  Scheduled: Recommend continuing currently scheduled lithium  mg, Zyprexa 5 mg QHS  Recommend obtaining repeat lithium level 10/15/23, last Li level: 0.1 on 10/10/23  PRN: Not requiring PRN medications at this time.    DELIRIUM  DELIRIUM BEHAVIOR MANAGEMENT  PLEASE utilize CHEMICAL restraints with PRN meds first for agitation. Minimize use of PHYSICAL restraints OR have periods of being out of physical restraints if possible.  Keep window shades open and room lit during day and room dim at night in order to promote normal sleep-wake cycles  Encourage family at bedside. [place] patient often to situation, location, date.  Continue to Limit or Discontinue use of Narcotics, Benzos and Anti-cholinergic medications as they may worsen delirium.  Continue medical workup for causative etiology of Delirium.     RISK ASSESSMENT  NO NEED FOR PEC patient NOT in any imminent danger of hurting self or others and not gravely disabled.   However patient requiring 24 hour care at baseline per patients family, if patient were to decide to AMA prior to adequate dispo being arranged, recommend low threshold for PEC.     FOLLOW UP  Will " follow up while in house    DISPOSITION - once medically cleared:   Defer to medical team. Patient previously living at [facility]. Patients sister Zaria ([phone]) wishes to updated prior to discharge.     Please contact ON CALL psychiatry service (24/7) for any acute issues that may arise.    Dr. Jose Fraga  Psychiatry, PGY-II  CL Psychiatry  [facility]  10/14/2023 10:09 PM        --------------------------------------------------------------------------------------------------------------------------------------------------------------------------------------------------------------------------------------    CONTINUED HISTORY & OBJECTIVE clinical data & findings reviewed and noted for above decision making    Past Medical/Surgical History:   Past Medical History:   Diagnosis Date    Addiction to drug     Alcohol abuse     Alcohol abuse, in remission 6/15/2015    14.5 weeks ago; AA weekly    Anemia     Anxiety 6/15/2015    Behavioral problem     Bipolar disorder     Bipolar disorder in remission 6/15/2015    Cirrhosis, Laennec's 6/15/2015    Depression     Encounter for blood transfusion     Epistaxis 6/15/2015    Fatigue     Glaucoma     Hematuria     Hepatic encephalopathy 6/15/2015    Hepatic enlargement     History of psychiatric care     History of psychiatric hospitalization     History of seizure 6/15/2015    1    hx of intentional Tylenol overdose 6/15/2015    2005- situational and hx of bipolar    Hx of psychiatric care     Macrocytic anemia 9/18/2015    6 units PRBC since June 2015    Jeana     Osteoarthritis     Other ascites 6/15/2015    Psychiatric exam requested by authority     Psychiatric problem     Psychosis 9/26/2019    Renal disorder     Seizures     Self-harming behavior     Suicide attempt     Therapy     Thrombocytopenia 6/15/2015     Past Surgical History:   Procedure Laterality Date    COSMETIC SURGERY      EMBOLIZATION N/A 6/11/2023    Procedure: EMBOLIZATION,  BLOOD VESSEL;  Surgeon: Debbie Surgeon;  [facility]: [facility];  Service: Anesthesiology;  Laterality: N/A;    ESOPHAGOGASTRODUODENOSCOPY      ESOPHAGOGASTRODUODENOSCOPY N/A 7/6/2023    Procedure: EGD (ESOPHAGOGASTRODUODENOSCOPY);  Surgeon: Bernice Guillen MD;  Location: [facility] (2ND FLR);  Service: Endoscopy;  Laterality: N/A;    ESOPHAGOGASTRODUODENOSCOPY N/A 7/11/2023    Procedure: EGD (ESOPHAGOGASTRODUODENOSCOPY);  Surgeon: Rangel Broussard MD;  Location: [facility] (2ND FLR);  Service: Endoscopy;  Laterality: N/A;       Current Medications:   Scheduled Meds:    brimonidine 0.15 % OPTH DROP  1 drop Both Eyes BID    And    timolol maleate 0.5%  1 drop Both Eyes BID    ferrous sulfate  1 tablet Oral Daily    furosemide  20 mg Oral Daily    lactulose  30 g Oral Daily    levetiracetam  1,000 mg Oral BID    linezolid  600 mg Oral Q12H    lithium  300 mg Oral QHS    magnesium oxide  400 mg Oral Daily    multivitamin  1 tablet Oral Daily    OLANZapine  5 mg Oral QHS    pantoprazole  40 mg Oral BID    rifAXIMin  550 mg Oral BID    sodium bicarbonate  650 mg Oral TID AC    spironolactone  100 mg Oral Daily     PRN Meds: acetaminophen, ALPRAZolam, ondansetron, ondansetron, sodium chloride 0.9%    Allergies:   Review of patient's allergies indicates:   Allergen Reactions    Sulfa (sulfonamide antibiotics) Rash    Codeine Nausea And Vomiting       Vitals  Vitals:    10/14/23 1922   BP: (!) 110/59   Pulse: 105   Resp: 18   Temp: 98.9 °F (37.2 °C)       Labs/Imaging/Studies:  Recent Results (from the past 24 hour(s))   CBC Auto Differential    Collection Time: 10/14/23  6:09 AM   Result Value Ref Range    WBC 2.78 (L) 3.90 - 12.70 K/uL    RBC 2.92 (L) 4.00 - 5.40 M/uL    Hemoglobin 10.5 (L) 12.0 - 16.0 g/dL    Hematocrit 30.8 (L) 37.0 - 48.5 %     (H) 82 - 98 fL    MCH 36.0 (H) 27.0 - 31.0 pg    MCHC 34.1 32.0 - 36.0 g/dL    RDW 15.5 (H) 11.5 - 14.5 %    Platelets 76 (L) 150 - 450 K/uL    MPV 9.6 9.2 - 12.9 fL     Immature Granulocytes 0.4 0.0 - 0.5 %    Gran # (ANC) 1.6 (L) 1.8 - 7.7 K/uL    Immature Grans (Abs) 0.01 0.00 - 0.04 K/uL    Lymph # 0.7 (L) 1.0 - 4.8 K/uL    Mono # 0.4 0.3 - 1.0 K/uL    Eos # 0.1 0.0 - 0.5 K/uL    Baso # 0.02 0.00 - 0.20 K/uL    nRBC 0 0 /100 WBC    Gran % 57.2 38.0 - 73.0 %    Lymph % 24.1 18.0 - 48.0 %    Mono % 12.6 4.0 - 15.0 %    Eosinophil % 5.0 0.0 - 8.0 %    Basophil % 0.7 0.0 - 1.9 %    Differential Method Automated    Comprehensive metabolic panel    Collection Time: 10/14/23  6:09 AM   Result Value Ref Range    Sodium 130 (L) 136 - 145 mmol/L    Potassium 4.5 3.5 - 5.1 mmol/L    Chloride 105 95 - 110 mmol/L    CO2 18 (L) 23 - 29 mmol/L    Glucose 85 70 - 110 mg/dL    BUN 11 6 - 20 mg/dL    Creatinine 0.5 0.5 - 1.4 mg/dL    Calcium 8.7 8.7 - 10.5 mg/dL    Total Protein 6.2 6.0 - 8.4 g/dL    Albumin 2.4 (L) 3.5 - 5.2 g/dL    Total Bilirubin 3.6 (H) 0.1 - 1.0 mg/dL    Alkaline Phosphatase 182 (H) 55 - 135 U/L    AST 61 (H) 10 - 40 U/L    ALT 36 10 - 44 U/L    eGFR >60.0 >60 mL/min/1.73 m^2    Anion Gap 7 (L) 8 - 16 mmol/L   Magnesium    Collection Time: 10/14/23  6:09 AM   Result Value Ref Range    Magnesium 1.8 1.6 - 2.6 mg/dL     Imaging Results              CT Abdomen Pelvis With Contrast (Final result)  Result time 10/11/23 09:19:53      Final result by Heriberto Claudio MD (10/11/23 09:19:53)                   Impression:      Cirrhosis with signs of portal hypertension including splenomegaly and portosystemic collaterals.    Cholelithiasis.    Decreased 9.8 cm left retroperitoneal collection, which could represent a hematoma or abscess.  The previous collection in the left iliacus muscle has resolved.    Large colonic stool burden.  No bowel obstruction or inflammation.    Other findings as described.      Electronically signed by: Heriberto Claudio  Date:    10/11/2023  Time:    09:19               Narrative:    EXAMINATION:  CT ABDOMEN PELVIS WITH CONTRAST    CLINICAL HISTORY:  Abdominal  abscess/infection suspected;    TECHNIQUE:  Low dose axial images, sagittal and coronal reformations were obtained from the lung bases to the pubic symphysis following the IV administration of 75 mL of Omnipaque 350 .  Oral contrast was not given.    COMPARISON:  Multiple CTs, most recent 07/10/2023    FINDINGS:  LUNG BASES: Unremarkable.    HEPATOBILIARY: Cirrhotic liver morphology.  No focal hepatic lesions.  The gallbladder is collapsed around multiple gallstones.  No biliary ductal dilatation.    SPLEEN: Splenomegaly measuring 13.0 cm.    PANCREAS: No focal masses or ductal dilatation.    ADRENALS: No adrenal nodules.    KIDNEYS/URETERS: No hydronephrosis or stones.  There are subcentimeter hypodensities in both kidneys, which are too small to characterize but unchanged.    BLADDER/PELVIC ORGANS: Bladder is decompressed around a Saldaña catheter.  Uterus is unremarkable.  No adnexal mass.    PERITONEUM / RETROPERITONEUM: Diffuse mesenteric fat stranding.  No free fluid.  There is a 9.8 x 5.4 x 4.0 cm peripherally enhancing fluid collection in the left retroperitoneum (2:93), previously 15.1 x 10.8 x 9.4 cm.  The previous collection within the left iliacus muscle has resolved.  No free air.    LYMPH NODES: No lymphadenopathy.    VESSELS: Mild atherosclerosis.  There are embolization coils in the gastroduodenal artery.  Metal artifact degrades evaluation of adjacent structures.  Infrarenal IVC filter in place.  There are venous collaterals including esophageal varices and enlarged superior mesenteric and right gonadal veins.  Portal veins are patent.    GI TRACT: Large colonic stool burden.  No evidence of bowel obstruction or inflammation.  Normal appendix.    BONES AND SOFT TISSUES: Anasarca.  Generalized muscle atrophy.  Diffuse osteopenia.  Postoperative changes from left total hip arthroplasty.  No acute fracture or focal lesion.

## 2023-10-15 NOTE — NURSING
Nurses Note -- 4 Eyes      10/15/2023   5:38 AM      Skin assessed during: Q Shift Change      [] No Altered Skin Integrity Present    []Prevention Measures Documented      [x] Yes- Altered Skin Integrity Present or Discovered   [x] LDA Added if Not in Epic (Describe Wound)   [x] New Altered Skin Integrity was Present on Admit and Documented in LDA   [x] Wound Image Taken    Wound Care Consulted? Yes    Attending Nurse:  Fabian Rangel RN/Staff Member:  Nunu Ríos

## 2023-10-15 NOTE — SUBJECTIVE & OBJECTIVE
Interval History: no significant nursing events reported to me from overnight. Waiting on Psych evaluation. CEC'd 10/12.     Review of Systems   Constitutional:  Negative for chills and fever.   Respiratory:  Negative for cough and shortness of breath.    Cardiovascular:  Negative for chest pain.   Gastrointestinal:  Negative for diarrhea, nausea and vomiting.   All other systems reviewed and are negative.    Objective:         Physical Exam  Vitals and nursing note reviewed.   Constitutional:       General: She is not in acute distress.     Appearance: She is not toxic-appearing.   HENT:      Head: Normocephalic and atraumatic.   Eyes:      Extraocular Movements: Extraocular movements intact.   Pulmonary:      Effort: Pulmonary effort is normal. No respiratory distress.   Musculoskeletal:      Right lower leg: No edema.      Left lower leg: No edema.   Skin:     Coloration: Skin is not jaundiced.   Neurological:      General: No focal deficit present.      Mental Status: She is alert and oriented to person, place, and time.   Psychiatric:         Behavior: Behavior normal.             Significant Labs: All pertinent labs within the past 24 hours have been reviewed.  CBC:   Recent Labs   Lab 10/14/23  0609   WBC 2.78*   HGB 10.5*   HCT 30.8*   PLT 76*     CMP:   Recent Labs   Lab 10/14/23  0609   *   K 4.5      CO2 18*   GLU 85   BUN 11   CREATININE 0.5   CALCIUM 8.7   PROT 6.2   ALBUMIN 2.4*   BILITOT 3.6*   ALKPHOS 182*   AST 61*   ALT 36   ANIONGAP 7*     Magnesium:   Recent Labs   Lab 10/14/23  0609   MG 1.8       Significant Imaging: I have reviewed all pertinent imaging results/findings within the past 24 hours.

## 2023-10-15 NOTE — ASSESSMENT & PLAN NOTE
Acute metabolic encephalopathy - RESOLVED  Hepatic encephalopathy - RESOLVED  History of recurrent UTIs with current admission for VRE  Chronic indwelling baugh catheter for urinary retention  -increased agitation and aggression, refusing home medications    -HDS and afebrile on admit  -admission labs:   -CBC with WBC 3, H/H 11.4/33.5, platelets 87 (all at or better than baseline). Chemistry with stable renal function, glucose 89, K 4.4, alk phose 157, TBili 3.9, AST 57, ALT 30 (all similar to baseline). Ammonia 52. TSH 0.115/FT4 1.12. PT/INR pending. Lithium level 0.1. Serum acetaminophen <3.0. Serum alcohol <10. UDS + benzos. UA concerning for infection.   -admission micro: VRE+, Zyvox PO started with ID consulted-> 3 day rx. Monitor cbc daily while admitted.  -admission diagnostics:   -CT A/P with cirrhosis with signs of portal hypertension including splenomegaly and portosystemic collaterals; cholelithiasis; decreased 9.8 cm left retroperitoneal collection, which could represent a hematoma or abscess; large colonic stool burden; no bowel obstruction or inflammation.  -PEC'd in ED -> continue PEC hold due to grave disability, CEC'd 10/12. d/w Psychiatry resident on call  -continue home lithium, zyprexa, and PRN alprazolam   -med and dose adjustments per Psych  -continue home lactulose- reduce dose to 30g daily (goal 2-3 soft bms daily), rifaximin, and spironolactone  -delirium and fall precautions

## 2023-10-15 NOTE — PLAN OF CARE
Problem: Infection  Goal: Absence of Infection Signs and Symptoms  Outcome: Ongoing, Progressing     Problem: Adult Inpatient Plan of Care  Goal: Plan of Care Review  Outcome: Ongoing, Progressing  Goal: Patient-Specific Goal (Individualized)  Outcome: Ongoing, Progressing  Goal: Absence of Hospital-Acquired Illness or Injury  Outcome: Ongoing, Progressing  Goal: Optimal Comfort and Wellbeing  Outcome: Ongoing, Progressing  Goal: Readiness for Transition of Care  Outcome: Ongoing, Progressing     Problem: Adjustment to Illness (Sepsis/Septic Shock)  Goal: Optimal Coping  Outcome: Ongoing, Progressing     Problem: Bleeding (Sepsis/Septic Shock)  Goal: Absence of Bleeding  Outcome: Ongoing, Progressing     Problem: Glycemic Control Impaired (Sepsis/Septic Shock)  Goal: Blood Glucose Level Within Desired Range  Outcome: Ongoing, Progressing     Problem: Skin Injury Risk Increased  Goal: Skin Health and Integrity  Outcome: Ongoing, Progressing     Problem: Impaired Wound Healing  Goal: Optimal Wound Healing  Outcome: Ongoing, Progressing     Problem: Violence Risk or Actual  Goal: Anger and Impulse Control  Outcome: Ongoing, Progressing     Problem: Nutrition Impaired (Sepsis/Septic Shock)  Goal: Optimal Nutrition Intake  Outcome: Ongoing, Not Progressing     No significant changes on our shift. Patient remained free from fall and was been cooperative for the whole shift.
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
## 2023-10-15 NOTE — PROGRESS NOTES
"CONSULTATION LIAISON PSYCHIATRY PROGRESS NOTE    Patient Name: Marely Hamilton  MRN: [id number]  Patient Class: OP- Observation  Admission Date: 10/10/2023  Attending Physician: Kena Esqueda MD      SUBJECTIVE:   Marely Hamilton is a 58 y.o. female with past psychiatric history of bipolar disorder, alcohol use disorder & past pertinent medical history of seizure disorder, alcohol induced hepatic cirrhosis presents to the ED/admitted to the hospital for Urinary tract infection associated with indwelling urethral catheter     Psychiatry consulted for "PEC'd for combative behavior - needs psych eval"     On psych exam, prior to interview patient resting comfortably in bed. Patient reported she slept on and off last night but overall was doing well. Talked to patient about her plan when she leaves here. She endorsed that she plans to go home to live with her . Hen redirected about living with sister patient stated "that is fine too". Then discussed returning to NH before able to go with sister patient stated she would go to a NH but refuses to go back to [facility]. Patient denied any other issues.        OBJECTIVE:    Mental Status Exam:  General Appearance: appears stated age, well developed and nourished, adequately groomed and appropriately dressed, in no acute distress  Behavior: normal; cooperative; reasonably friendly, pleasant, and polite; appropriate eye-contact; under good behavioral control  Involuntary Movements and Motor Activity: no abnormal involuntary movements noted; no tics, no tremors, no akathisia, no dystonia, no evidence of tardive dyskinesia; no psychomotor agitation or retardation  Gait and Station: unable to assess - patient lying down or seated  Speech and Language: slowed, monotone  Mood: "Fine"  Affect: blunted  Thought Process and Associations: linear, goal-directed, abstract (proverbs & similarities)  Thought Content and Perceptions:: no suicidal ideation, no homicidal ideation, no " auditory hallucinations, no visual hallucinations  Sensorium and Orientation: oriented fully (to person, place, and time)  Recent and Remote Memory: grossly intact  Attention and Concentration: grossly intact, attentive to the conversation and not readily distractible  Fund of Knowledge: intact  Insight: intact  Judgment: intact    CAM ICU positive? no      ASSESSMENT & RECOMMENDATIONS      BIPOLAR I  PSYCH MEDICATIONS  Scheduled: Recommend continuing currently scheduled lithium  mg, Zyprexa 5 mg QHS  Recommend obtaining repeat lithium level 10/15/23, last Li level: 0.1 on 10/10/23  Recommend holding lasix while hyponatremic as patient refused her sodium bicarb as the lasix and lithium can both cause hyponatremia. However, would not want to stop lithium and disrupt her mood stability.   PRN: Not requiring PRN medications at this time.     DELIRIUM  DELIRIUM BEHAVIOR MANAGEMENT  PLEASE utilize  PRN meds first for agitation. Minimize use of PHYSICAL restraints OR have periods of being out of physical restraints if possible.  Keep window shades open and room lit during day and room dim at night in order to promote normal sleep-wake cycles  Encourage family at bedside. [place] patient often to situation, location, date.  Continue to Limit or Discontinue use of Narcotics, Benzos and Anti-cholinergic medications as they may worsen delirium.  Continue medical workup for causative etiology of Delirium.      RISK ASSESSMENT  NO NEED FOR PEC patient NOT in any imminent danger of hurting self or others and not gravely disabled.   However patient requiring 24 hour care at baseline per patients family, if patient were to decide to AMA prior to adequate dispo being arranged, recommend low threshold for PEC.      FOLLOW UP  Will follow up while in house     DISPOSITION - once medically cleared:   Defer to medical team. Patient previously living at [facility]. Patients sister Zaria ([phone]) wishes to updated prior to  discharge    Please contact ON CALL psychiatry service (24/7) for any acute issues that may arise.    Dr. Mary Lou Jenkins   Psychiatry  [facility]  10/15/2023 7:15 AM        --------------------------------------------------------------------------------------------------------------------------------------------------------------------------------------------------------------------------------------    CONTINUED OBJECTIVE clinical data & findings reviewed and noted for above decision making    Current Medications:   Scheduled Meds:    brimonidine 0.15 % OPTH DROP  1 drop Both Eyes BID    And    timolol maleate 0.5%  1 drop Both Eyes BID    ferrous sulfate  1 tablet Oral Daily    furosemide  20 mg Oral Daily    lactulose  30 g Oral Daily    levetiracetam  1,000 mg Oral BID    linezolid  600 mg Oral Q12H    lithium  300 mg Oral QHS    magnesium oxide  400 mg Oral Daily    multivitamin  1 tablet Oral Daily    OLANZapine  5 mg Oral QHS    pantoprazole  40 mg Oral BID    rifAXIMin  550 mg Oral BID    sodium bicarbonate  650 mg Oral TID AC    spironolactone  100 mg Oral Daily     PRN Meds: acetaminophen, ALPRAZolam, ondansetron, ondansetron, sodium chloride 0.9%    Allergies:   Review of patient's allergies indicates:   Allergen Reactions    Sulfa (sulfonamide antibiotics) Rash    Codeine Nausea And Vomiting       Vitals  Vitals:    10/15/23 0514   BP: (!) 103/59   Pulse: 72   Resp: 18   Temp: 97.7 °F (36.5 °C)       Labs/Imaging/Studies:  No results found for this or any previous visit (from the past 24 hour(s)).  Imaging Results              CT Abdomen Pelvis With Contrast (Final result)  Result time 10/11/23 09:19:53      Final result by Heriberto Claudio MD (10/11/23 09:19:53)                   Impression:      Cirrhosis with signs of portal hypertension including splenomegaly and portosystemic collaterals.    Cholelithiasis.    Decreased 9.8 cm left retroperitoneal collection, which could  represent a hematoma or abscess.  The previous collection in the left iliacus muscle has resolved.    Large colonic stool burden.  No bowel obstruction or inflammation.    Other findings as described.      Electronically signed by: Heriberto Claudio  Date:    10/11/2023  Time:    09:19               Narrative:    EXAMINATION:  CT ABDOMEN PELVIS WITH CONTRAST    CLINICAL HISTORY:  Abdominal abscess/infection suspected;    TECHNIQUE:  Low dose axial images, sagittal and coronal reformations were obtained from the lung bases to the pubic symphysis following the IV administration of 75 mL of Omnipaque 350 .  Oral contrast was not given.    COMPARISON:  Multiple CTs, most recent 07/10/2023    FINDINGS:  LUNG BASES: Unremarkable.    HEPATOBILIARY: Cirrhotic liver morphology.  No focal hepatic lesions.  The gallbladder is collapsed around multiple gallstones.  No biliary ductal dilatation.    SPLEEN: Splenomegaly measuring 13.0 cm.    PANCREAS: No focal masses or ductal dilatation.    ADRENALS: No adrenal nodules.    KIDNEYS/URETERS: No hydronephrosis or stones.  There are subcentimeter hypodensities in both kidneys, which are too small to characterize but unchanged.    BLADDER/PELVIC ORGANS: Bladder is decompressed around a Saldaña catheter.  Uterus is unremarkable.  No adnexal mass.    PERITONEUM / RETROPERITONEUM: Diffuse mesenteric fat stranding.  No free fluid.  There is a 9.8 x 5.4 x 4.0 cm peripherally enhancing fluid collection in the left retroperitoneum (2:93), previously 15.1 x 10.8 x 9.4 cm.  The previous collection within the left iliacus muscle has resolved.  No free air.    LYMPH NODES: No lymphadenopathy.    VESSELS: Mild atherosclerosis.  There are embolization coils in the gastroduodenal artery.  Metal artifact degrades evaluation of adjacent structures.  Infrarenal IVC filter in place.  There are venous collaterals including esophageal varices and enlarged superior mesenteric and right gonadal veins.  Portal  veins are patent.    GI TRACT: Large colonic stool burden.  No evidence of bowel obstruction or inflammation.  Normal appendix.    BONES AND SOFT TISSUES: Anasarca.  Generalized muscle atrophy.  Diffuse osteopenia.  Postoperative changes from left total hip arthroplasty.  No acute fracture or focal lesion.

## 2023-10-16 LAB
ALBUMIN SERPL BCP-MCNC: 2.2 G/DL (ref 3.5–5.2)
ALP SERPL-CCNC: 172 U/L (ref 55–135)
ALT SERPL W/O P-5'-P-CCNC: 30 U/L (ref 10–44)
ANION GAP SERPL CALC-SCNC: 6 MMOL/L (ref 8–16)
AST SERPL-CCNC: 50 U/L (ref 10–40)
BASOPHILS # BLD AUTO: 0.02 K/UL (ref 0–0.2)
BASOPHILS NFR BLD: 0.7 % (ref 0–1.9)
BILIRUB SERPL-MCNC: 3.9 MG/DL (ref 0.1–1)
BUN SERPL-MCNC: 15 MG/DL (ref 6–20)
CALCIUM SERPL-MCNC: 8.4 MG/DL (ref 8.7–10.5)
CHLORIDE SERPL-SCNC: 105 MMOL/L (ref 95–110)
CHLORIDE UR-SCNC: 73 MMOL/L (ref 25–200)
CO2 SERPL-SCNC: 18 MMOL/L (ref 23–29)
CREAT SERPL-MCNC: 0.5 MG/DL (ref 0.5–1.4)
DIFFERENTIAL METHOD: ABNORMAL
EOSINOPHIL # BLD AUTO: 0.2 K/UL (ref 0–0.5)
EOSINOPHIL NFR BLD: 5.7 % (ref 0–8)
ERYTHROCYTE [DISTWIDTH] IN BLOOD BY AUTOMATED COUNT: 15.2 % (ref 11.5–14.5)
EST. GFR  (NO RACE VARIABLE): >60 ML/MIN/1.73 M^2
GLUCOSE SERPL-MCNC: 91 MG/DL (ref 70–110)
HCT VFR BLD AUTO: 30.9 % (ref 37–48.5)
HGB BLD-MCNC: 10.3 G/DL (ref 12–16)
IMM GRANULOCYTES # BLD AUTO: 0.01 K/UL (ref 0–0.04)
IMM GRANULOCYTES NFR BLD AUTO: 0.3 % (ref 0–0.5)
LITHIUM SERPL-SCNC: 0.3 MMOL/L (ref 0.6–1.2)
LYMPHOCYTES # BLD AUTO: 0.6 K/UL (ref 1–4.8)
LYMPHOCYTES NFR BLD: 21.3 % (ref 18–48)
MAGNESIUM SERPL-MCNC: 1.8 MG/DL (ref 1.6–2.6)
MCH RBC QN AUTO: 35.2 PG (ref 27–31)
MCHC RBC AUTO-ENTMCNC: 33.3 G/DL (ref 32–36)
MCV RBC AUTO: 106 FL (ref 82–98)
MONOCYTES # BLD AUTO: 0.4 K/UL (ref 0.3–1)
MONOCYTES NFR BLD: 12.5 % (ref 4–15)
NEUTROPHILS # BLD AUTO: 1.8 K/UL (ref 1.8–7.7)
NEUTROPHILS NFR BLD: 59.5 % (ref 38–73)
NRBC BLD-RTO: 0 /100 WBC
OSMOLALITY SERPL: 291 MOSM/KG (ref 275–295)
OSMOLALITY UR: 302 MOSM/KG (ref 50–1200)
PLATELET # BLD AUTO: 73 K/UL (ref 150–450)
PMV BLD AUTO: 9.6 FL (ref 9.2–12.9)
POTASSIUM SERPL-SCNC: 5.1 MMOL/L (ref 3.5–5.1)
PROT SERPL-MCNC: 5.7 G/DL (ref 6–8.4)
RBC # BLD AUTO: 2.93 M/UL (ref 4–5.4)
SODIUM SERPL-SCNC: 129 MMOL/L (ref 136–145)
SODIUM UR-SCNC: 81 MMOL/L (ref 20–250)
WBC # BLD AUTO: 2.96 K/UL (ref 3.9–12.7)

## 2023-10-16 PROCEDURE — 83935 ASSAY OF URINE OSMOLALITY: CPT | Performed by: HOSPITALIST

## 2023-10-16 PROCEDURE — 85025 COMPLETE CBC W/AUTO DIFF WBC: CPT | Performed by: HOSPITALIST

## 2023-10-16 PROCEDURE — 25000003 PHARM REV CODE 250: Performed by: NURSE PRACTITIONER

## 2023-10-16 PROCEDURE — 80178 ASSAY OF LITHIUM: CPT | Performed by: HOSPITALIST

## 2023-10-16 PROCEDURE — 25000003 PHARM REV CODE 250: Performed by: HOSPITALIST

## 2023-10-16 PROCEDURE — 11000001 HC ACUTE MED/SURG PRIVATE ROOM

## 2023-10-16 PROCEDURE — 99233 SBSQ HOSP IP/OBS HIGH 50: CPT | Mod: 95,,, | Performed by: HOSPITALIST

## 2023-10-16 PROCEDURE — 36415 COLL VENOUS BLD VENIPUNCTURE: CPT | Performed by: HOSPITALIST

## 2023-10-16 PROCEDURE — 94761 N-INVAS EAR/PLS OXIMETRY MLT: CPT

## 2023-10-16 PROCEDURE — 27000207 HC ISOLATION

## 2023-10-16 PROCEDURE — 83930 ASSAY OF BLOOD OSMOLALITY: CPT | Performed by: HOSPITALIST

## 2023-10-16 PROCEDURE — 82436 ASSAY OF URINE CHLORIDE: CPT | Performed by: HOSPITALIST

## 2023-10-16 PROCEDURE — 80053 COMPREHEN METABOLIC PANEL: CPT | Performed by: HOSPITALIST

## 2023-10-16 PROCEDURE — 99233 PR SUBSEQUENT HOSPITAL CARE,LEVL III: ICD-10-PCS | Mod: 95,,, | Performed by: HOSPITALIST

## 2023-10-16 PROCEDURE — 84300 ASSAY OF URINE SODIUM: CPT | Performed by: HOSPITALIST

## 2023-10-16 PROCEDURE — 83735 ASSAY OF MAGNESIUM: CPT | Performed by: HOSPITALIST

## 2023-10-16 RX ORDER — FUROSEMIDE 40 MG/1
40 TABLET ORAL DAILY
Status: DISCONTINUED | OUTPATIENT
Start: 2023-10-17 | End: 2023-10-16

## 2023-10-16 RX ADMIN — RIFAXIMIN 550 MG: 550 TABLET ORAL at 09:10

## 2023-10-16 RX ADMIN — LEVETIRACETAM 1000 MG: 100 SOLUTION ORAL at 08:10

## 2023-10-16 RX ADMIN — PANTOPRAZOLE SODIUM 40 MG: 40 GRANULE, DELAYED RELEASE ORAL at 09:10

## 2023-10-16 RX ADMIN — TIMOLOL MALEATE 1 DROP: 5 SOLUTION/ DROPS OPHTHALMIC at 08:10

## 2023-10-16 RX ADMIN — BRIMONIDINE TARTRATE 1 DROP: 1.5 SOLUTION OPHTHALMIC at 09:10

## 2023-10-16 RX ADMIN — FERROUS SULFATE TAB 325 MG (65 MG ELEMENTAL FE) 1 EACH: 325 (65 FE) TAB at 09:10

## 2023-10-16 RX ADMIN — LACTULOSE 30 G: 20 SOLUTION ORAL at 09:10

## 2023-10-16 RX ADMIN — LEVETIRACETAM 1000 MG: 100 SOLUTION ORAL at 09:10

## 2023-10-16 RX ADMIN — FUROSEMIDE 20 MG: 20 TABLET ORAL at 09:10

## 2023-10-16 RX ADMIN — SPIRONOLACTONE 100 MG: 25 TABLET ORAL at 09:10

## 2023-10-16 RX ADMIN — OLANZAPINE 5 MG: 5 TABLET, FILM COATED ORAL at 08:10

## 2023-10-16 RX ADMIN — ALPRAZOLAM 0.25 MG: 0.25 TABLET ORAL at 08:10

## 2023-10-16 RX ADMIN — Medication 400 MG: at 09:10

## 2023-10-16 RX ADMIN — PANTOPRAZOLE SODIUM 40 MG: 40 GRANULE, DELAYED RELEASE ORAL at 08:10

## 2023-10-16 RX ADMIN — TIMOLOL MALEATE 1 DROP: 5 SOLUTION/ DROPS OPHTHALMIC at 09:10

## 2023-10-16 RX ADMIN — BRIMONIDINE TARTRATE 1 DROP: 1.5 SOLUTION OPHTHALMIC at 08:10

## 2023-10-16 RX ADMIN — LITHIUM CARBONATE 300 MG: 300 TABLET, FILM COATED, EXTENDED RELEASE ORAL at 08:10

## 2023-10-16 RX ADMIN — THERA TABS 1 TABLET: TAB at 09:10

## 2023-10-16 RX ADMIN — RIFAXIMIN 550 MG: 550 TABLET ORAL at 08:10

## 2023-10-16 NOTE — PLAN OF CARE
Problem: Infection  Goal: Absence of Infection Signs and Symptoms  Outcome: Ongoing, Progressing     Problem: Adult Inpatient Plan of Care  Goal: Plan of Care Review  Outcome: Ongoing, Progressing  Goal: Patient-Specific Goal (Individualized)  Outcome: Ongoing, Progressing  Goal: Absence of Hospital-Acquired Illness or Injury  Outcome: Ongoing, Progressing  Goal: Optimal Comfort and Wellbeing  Outcome: Ongoing, Progressing  Goal: Readiness for Transition of Care  Outcome: Ongoing, Progressing     Problem: Adjustment to Illness (Sepsis/Septic Shock)  Goal: Optimal Coping  Outcome: Ongoing, Progressing     Problem: Bleeding (Sepsis/Septic Shock)  Goal: Absence of Bleeding  Outcome: Ongoing, Progressing     Problem: Glycemic Control Impaired (Sepsis/Septic Shock)  Goal: Blood Glucose Level Within Desired Range  Outcome: Ongoing, Progressing     Problem: Infection Progression (Sepsis/Septic Shock)  Goal: Absence of Infection Signs and Symptoms  Outcome: Ongoing, Progressing     Problem: Nutrition Impaired (Sepsis/Septic Shock)  Goal: Optimal Nutrition Intake  Outcome: Ongoing, Progressing     Problem: Fluid Imbalance (Pneumonia)  Goal: Fluid Balance  Outcome: Ongoing, Progressing     Problem: Infection (Pneumonia)  Goal: Resolution of Infection Signs and Symptoms  Outcome: Ongoing, Progressing     Problem: Respiratory Compromise (Pneumonia)  Goal: Effective Oxygenation and Ventilation  Outcome: Ongoing, Progressing     Problem: Skin Injury Risk Increased  Goal: Skin Health and Integrity  Outcome: Ongoing, Progressing     Problem: Impaired Wound Healing  Goal: Optimal Wound Healing  Outcome: Ongoing, Progressing     Problem: Violence Risk or Actual  Goal: Anger and Impulse Control  Outcome: Ongoing, Progressing

## 2023-10-16 NOTE — PLAN OF CARE
Spoke with patient's sister in [place] to discuss discharge plan for patient. Patient's sister states that she would like for patient to return back to [facility] with plans in the future of her relocating the patient to live with her. Informed [facility] of patient's return and [facility].       NORMA Blanton  Case Management  [phone]

## 2023-10-16 NOTE — PROGRESS NOTES
"CONSULTATION LIAISON PSYCHIATRY PROGRESS NOTE    Patient Name: Marely Hamilton  MRN: [id number]  Patient Class: IP- Inpatient  Admission Date: 10/10/2023  Attending Physician: Kena Esqueda MD      SUBJECTIVE:   Marely Hamilton is a 58 y.o. female with past psychiatric history of bipolar disorder, alcohol use disorder & past pertinent medical history of seizure disorder, alcohol induced hepatic cirrhosis presents to the ED/admitted to the hospital for Urinary tract infection associated with indwelling urethral catheter     Psychiatry consulted for "PEC'd for combative behavior - needs psych eval"     Patient did not require any behavioral PRN overnight. On psych exam, prior to interview patient resting comfortably in bed. She is calm and cooperative throughout exam. Oriented to person, place, date  and president. Denies SI/HI/AVH. When asked why she is in the hospital, states, "I get manic." States she is feeling better. Voices she does not want to return to nursing home, and instead says she is going home on discharge with "a private nurse." She otherwise has no further complaints. States she is eating and sleeping well.     OBJECTIVE:    Mental Status Exam:  General Appearance: appears stated age, well developed and nourished, adequately groomed and appropriately dressed, in no acute distress  Behavior: normal; cooperative; reasonably friendly, pleasant, and polite; appropriate eye-contact; under good behavioral control  Involuntary Movements and Motor Activity: no abnormal involuntary movements noted; no tics, no tremors, no akathisia, no dystonia, no evidence of tardive dyskinesia; no psychomotor agitation or retardation  Gait and Station: unable to assess - patient lying down or seated  Speech and Language: slowed, monotone  Mood: "Fine"  Affect: constricted  Thought Process and Associations: linear, goal-directed, abstract (proverbs & similarities)  Thought Content and Perceptions:: no suicidal ideation, no " homicidal ideation, no auditory hallucinations, no visual hallucinations  Sensorium and Orientation: oriented fully (to person, place, and time)  Recent and Remote Memory: grossly intact  Attention and Concentration: grossly intact, attentive to the conversation and not readily distractible  Fund of Knowledge: intact  Insight: intact  Judgment: intact    CAM ICU positive? no      ASSESSMENT & RECOMMENDATIONS      BIPOLAR I  PSYCH MEDICATIONS  Scheduled: Recommend continuing currently scheduled lithium  mg, Zyprexa 5 mg QHS  Recommend obtaining repeat lithium level 10/15/23, last Li level: 0.1 on 10/10/23  Recommend holding lasix while hyponatremic as patient refused her sodium bicarb as the lasix and lithium can both cause hyponatremia. However, would not want to stop lithium and disrupt her mood stability.   PRN: Not requiring PRN medications at this time.     DELIRIUM  DELIRIUM BEHAVIOR MANAGEMENT  PLEASE utilize  PRN meds first for agitation. Minimize use of PHYSICAL restraints OR have periods of being out of physical restraints if possible.  Keep window shades open and room lit during day and room dim at night in order to promote normal sleep-wake cycles  Encourage family at bedside. Scammon patient often to situation, location, date.  Continue to Limit or Discontinue use of Narcotics, Benzos and Anti-cholinergic medications as they may worsen delirium.  Continue medical workup for causative etiology of Delirium.      RISK ASSESSMENT  NO NEED FOR PEC patient NOT in any imminent danger of hurting self or others and not gravely disabled.   However patient requiring 24 hour care at baseline per patients family, if patient were to decide to AMA prior to adequate dispo being arranged, recommend low threshold for PEC.      FOLLOW UP  Will follow up while in house     DISPOSITION - once medically cleared:   Defer to medical team. Patient previously living at [facility]. Patients sister Zaria ([phone])  wishes to updated prior to discharge    Please contact ON CALL psychiatry service (24/7) for any acute issues that may arise.    Dr. Cameron Torrez  [facility] PGY2  CL Psychiatry  [facility]  10/16/2023 7:15 AM        --------------------------------------------------------------------------------------------------------------------------------------------------------------------------------------------------------------------------------------    CONTINUED OBJECTIVE clinical data & findings reviewed and noted for above decision making    Current Medications:   Scheduled Meds:    brimonidine 0.15 % OPTH DROP  1 drop Both Eyes BID    And    timolol maleate 0.5%  1 drop Both Eyes BID    ferrous sulfate  1 tablet Oral Daily    [START ON 10/17/2023] furosemide  40 mg Oral Daily    lactulose  30 g Oral Daily    levetiracetam  1,000 mg Oral BID    lithium  300 mg Oral QHS    magnesium oxide  400 mg Oral Daily    multivitamin  1 tablet Oral Daily    OLANZapine  5 mg Oral QHS    pantoprazole  40 mg Oral BID    rifAXIMin  550 mg Oral BID    spironolactone  100 mg Oral Daily     PRN Meds: acetaminophen, ALPRAZolam, ondansetron, ondansetron, sodium chloride 0.9%    Allergies:   Review of patient's allergies indicates:   Allergen Reactions    Sulfa (sulfonamide antibiotics) Rash    Codeine Nausea And Vomiting       Vitals  Vitals:    10/16/23 1059   BP: (!) 97/54   Pulse: 68   Resp: 16   Temp: 98.3 °F (36.8 °C)       Labs/Imaging/Studies:  Recent Results (from the past 24 hour(s))   CBC Auto Differential    Collection Time: 10/16/23  4:56 AM   Result Value Ref Range    WBC 2.96 (L) 3.90 - 12.70 K/uL    RBC 2.93 (L) 4.00 - 5.40 M/uL    Hemoglobin 10.3 (L) 12.0 - 16.0 g/dL    Hematocrit 30.9 (L) 37.0 - 48.5 %     (H) 82 - 98 fL    MCH 35.2 (H) 27.0 - 31.0 pg    MCHC 33.3 32.0 - 36.0 g/dL    RDW 15.2 (H) 11.5 - 14.5 %    Platelets 73 (L) 150 - 450 K/uL    MPV 9.6 9.2 - 12.9 fL    Immature  Granulocytes 0.3 0.0 - 0.5 %    Gran # (ANC) 1.8 1.8 - 7.7 K/uL    Immature Grans (Abs) 0.01 0.00 - 0.04 K/uL    Lymph # 0.6 (L) 1.0 - 4.8 K/uL    Mono # 0.4 0.3 - 1.0 K/uL    Eos # 0.2 0.0 - 0.5 K/uL    Baso # 0.02 0.00 - 0.20 K/uL    nRBC 0 0 /100 WBC    Gran % 59.5 38.0 - 73.0 %    Lymph % 21.3 18.0 - 48.0 %    Mono % 12.5 4.0 - 15.0 %    Eosinophil % 5.7 0.0 - 8.0 %    Basophil % 0.7 0.0 - 1.9 %    Differential Method Automated    Comprehensive metabolic panel    Collection Time: 10/16/23  4:56 AM   Result Value Ref Range    Sodium 129 (L) 136 - 145 mmol/L    Potassium 5.1 3.5 - 5.1 mmol/L    Chloride 105 95 - 110 mmol/L    CO2 18 (L) 23 - 29 mmol/L    Glucose 91 70 - 110 mg/dL    BUN 15 6 - 20 mg/dL    Creatinine 0.5 0.5 - 1.4 mg/dL    Calcium 8.4 (L) 8.7 - 10.5 mg/dL    Total Protein 5.7 (L) 6.0 - 8.4 g/dL    Albumin 2.2 (L) 3.5 - 5.2 g/dL    Total Bilirubin 3.9 (H) 0.1 - 1.0 mg/dL    Alkaline Phosphatase 172 (H) 55 - 135 U/L    AST 50 (H) 10 - 40 U/L    ALT 30 10 - 44 U/L    eGFR >60.0 >60 mL/min/1.73 m^2    Anion Gap 6 (L) 8 - 16 mmol/L   Magnesium    Collection Time: 10/16/23  4:56 AM   Result Value Ref Range    Magnesium 1.8 1.6 - 2.6 mg/dL     Imaging Results              CT Abdomen Pelvis With Contrast (Final result)  Result time 10/11/23 09:19:53      Final result by Heriberto Claudio MD (10/11/23 09:19:53)                   Impression:      Cirrhosis with signs of portal hypertension including splenomegaly and portosystemic collaterals.    Cholelithiasis.    Decreased 9.8 cm left retroperitoneal collection, which could represent a hematoma or abscess.  The previous collection in the left iliacus muscle has resolved.    Large colonic stool burden.  No bowel obstruction or inflammation.    Other findings as described.      Electronically signed by: Heriberto Claudio  Date:    10/11/2023  Time:    09:19               Narrative:    EXAMINATION:  CT ABDOMEN PELVIS WITH CONTRAST    CLINICAL HISTORY:  Abdominal  abscess/infection suspected;    TECHNIQUE:  Low dose axial images, sagittal and coronal reformations were obtained from the lung bases to the pubic symphysis following the IV administration of 75 mL of Omnipaque 350 .  Oral contrast was not given.    COMPARISON:  Multiple CTs, most recent 07/10/2023    FINDINGS:  LUNG BASES: Unremarkable.    HEPATOBILIARY: Cirrhotic liver morphology.  No focal hepatic lesions.  The gallbladder is collapsed around multiple gallstones.  No biliary ductal dilatation.    SPLEEN: Splenomegaly measuring 13.0 cm.    PANCREAS: No focal masses or ductal dilatation.    ADRENALS: No adrenal nodules.    KIDNEYS/URETERS: No hydronephrosis or stones.  There are subcentimeter hypodensities in both kidneys, which are too small to characterize but unchanged.    BLADDER/PELVIC ORGANS: Bladder is decompressed around a Saldaña catheter.  Uterus is unremarkable.  No adnexal mass.    PERITONEUM / RETROPERITONEUM: Diffuse mesenteric fat stranding.  No free fluid.  There is a 9.8 x 5.4 x 4.0 cm peripherally enhancing fluid collection in the left retroperitoneum (2:93), previously 15.1 x 10.8 x 9.4 cm.  The previous collection within the left iliacus muscle has resolved.  No free air.    LYMPH NODES: No lymphadenopathy.    VESSELS: Mild atherosclerosis.  There are embolization coils in the gastroduodenal artery.  Metal artifact degrades evaluation of adjacent structures.  Infrarenal IVC filter in place.  There are venous collaterals including esophageal varices and enlarged superior mesenteric and right gonadal veins.  Portal veins are patent.    GI TRACT: Large colonic stool burden.  No evidence of bowel obstruction or inflammation.  Normal appendix.    BONES AND SOFT TISSUES: Anasarca.  Generalized muscle atrophy.  Diffuse osteopenia.  Postoperative changes from left total hip arthroplasty.  No acute fracture or focal lesion.
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
## 2023-10-16 NOTE — PLAN OF CARE
[facility] - Med Surg  Discharge Reassessment    Primary Care Provider: Viktor Ross MD    Expected Discharge Date: 10/17/2023      Patient remains inpatient due to continued need for medical management. Patient continues to have altered mental status with dose adjustments to meds. Patient also receiving IV antibiotics. Discharge Plan A and Plan B have been determined by review of patient's clinical status, future medical and therapeutic needs, and coverage/benefits for post-acute care in coordination with multidisciplinary team members.   Reassessment (most recent)       Discharge Reassessment - 10/16/23 0862          Discharge Reassessment    Assessment Type Discharge Planning Reassessment (P)      Discharge Plan discussed with: Sibling (P)      Communicated BRAYAN with patient/caregiver Yes (P)      Discharge Plan A Return to nursing home (P)    Intermediate Care Facility    Discharge Plan B Return to Nursing Home (P)      DME Needed Upon Discharge  none (P)      Why the patient remains in the hospital Requires continued medical care (P)         Post-Acute Status    Discharge Delays None known at this time (P)                      NORMA Blanton  Case Management  [phone]

## 2023-10-16 NOTE — PLAN OF CARE
Problem: Infection  Goal: Absence of Infection Signs and Symptoms  Outcome: Ongoing, Progressing     Problem: Adult Inpatient Plan of Care  Goal: Plan of Care Review  Outcome: Ongoing, Progressing  Goal: Patient-Specific Goal (Individualized)  Outcome: Ongoing, Progressing  Goal: Absence of Hospital-Acquired Illness or Injury  Outcome: Ongoing, Progressing  Goal: Optimal Comfort and Wellbeing  Outcome: Ongoing, Progressing  Goal: Readiness for Transition of Care  Outcome: Ongoing, Progressing     Problem: Adjustment to Illness (Sepsis/Septic Shock)  Goal: Optimal Coping  Outcome: Ongoing, Progressing     Problem: Bleeding (Sepsis/Septic Shock)  Goal: Absence of Bleeding  Outcome: Ongoing, Progressing     Problem: Glycemic Control Impaired (Sepsis/Septic Shock)  Goal: Blood Glucose Level Within Desired Range  Outcome: Ongoing, Progressing     Problem: Infection Progression (Sepsis/Septic Shock)  Goal: Absence of Infection Signs and Symptoms  Outcome: Ongoing, Progressing     Problem: Nutrition Impaired (Sepsis/Septic Shock)  Goal: Optimal Nutrition Intake  Outcome: Ongoing, Progressing     Problem: Fluid Imbalance (Pneumonia)  Goal: Fluid Balance  Outcome: Ongoing, Progressing     Problem: Infection (Pneumonia)  Goal: Resolution of Infection Signs and Symptoms  Outcome: Ongoing, Progressing     Problem: Respiratory Compromise (Pneumonia)  Goal: Effective Oxygenation and Ventilation  Outcome: Ongoing, Progressing     Problem: Skin Injury Risk Increased  Goal: Skin Health and Integrity  Outcome: Ongoing, Progressing     Problem: Impaired Wound Healing  Goal: Optimal Wound Healing  Outcome: Ongoing, Progressing     Problem: Violence Risk or Actual  Goal: Anger and Impulse Control  Outcome: Ongoing, Progressing     No significant changes on our shift.

## 2023-10-17 LAB
ALBUMIN SERPL BCP-MCNC: 2.1 G/DL (ref 3.5–5.2)
ALP SERPL-CCNC: 174 U/L (ref 55–135)
ALT SERPL W/O P-5'-P-CCNC: 28 U/L (ref 10–44)
ANION GAP SERPL CALC-SCNC: 4 MMOL/L (ref 8–16)
AST SERPL-CCNC: 49 U/L (ref 10–40)
BASOPHILS # BLD AUTO: 0.02 K/UL (ref 0–0.2)
BASOPHILS NFR BLD: 0.8 % (ref 0–1.9)
BILIRUB SERPL-MCNC: 2.7 MG/DL (ref 0.1–1)
BUN SERPL-MCNC: 21 MG/DL (ref 6–20)
CALCIUM SERPL-MCNC: 8.7 MG/DL (ref 8.7–10.5)
CHLORIDE SERPL-SCNC: 109 MMOL/L (ref 95–110)
CO2 SERPL-SCNC: 19 MMOL/L (ref 23–29)
CREAT SERPL-MCNC: 0.6 MG/DL (ref 0.5–1.4)
DIFFERENTIAL METHOD: ABNORMAL
EOSINOPHIL # BLD AUTO: 0.2 K/UL (ref 0–0.5)
EOSINOPHIL NFR BLD: 6.4 % (ref 0–8)
ERYTHROCYTE [DISTWIDTH] IN BLOOD BY AUTOMATED COUNT: 15 % (ref 11.5–14.5)
EST. GFR  (NO RACE VARIABLE): >60 ML/MIN/1.73 M^2
GLUCOSE SERPL-MCNC: 91 MG/DL (ref 70–110)
HCT VFR BLD AUTO: 30 % (ref 37–48.5)
HGB BLD-MCNC: 10.3 G/DL (ref 12–16)
IMM GRANULOCYTES # BLD AUTO: 0.01 K/UL (ref 0–0.04)
IMM GRANULOCYTES NFR BLD AUTO: 0.4 % (ref 0–0.5)
LYMPHOCYTES # BLD AUTO: 0.7 K/UL (ref 1–4.8)
LYMPHOCYTES NFR BLD: 26.7 % (ref 18–48)
MAGNESIUM SERPL-MCNC: 1.9 MG/DL (ref 1.6–2.6)
MCH RBC QN AUTO: 35.5 PG (ref 27–31)
MCHC RBC AUTO-ENTMCNC: 34.3 G/DL (ref 32–36)
MCV RBC AUTO: 103 FL (ref 82–98)
MONOCYTES # BLD AUTO: 0.4 K/UL (ref 0.3–1)
MONOCYTES NFR BLD: 14.3 % (ref 4–15)
NEUTROPHILS # BLD AUTO: 1.4 K/UL (ref 1.8–7.7)
NEUTROPHILS NFR BLD: 51.4 % (ref 38–73)
NRBC BLD-RTO: 0 /100 WBC
OSMOLALITY SERPL: 295 MOSM/KG (ref 275–295)
PLATELET # BLD AUTO: 69 K/UL (ref 150–450)
PMV BLD AUTO: 9.5 FL (ref 9.2–12.9)
POTASSIUM SERPL-SCNC: 4.5 MMOL/L (ref 3.5–5.1)
PROT SERPL-MCNC: 5.8 G/DL (ref 6–8.4)
RBC # BLD AUTO: 2.9 M/UL (ref 4–5.4)
SODIUM SERPL-SCNC: 132 MMOL/L (ref 136–145)
WBC # BLD AUTO: 2.66 K/UL (ref 3.9–12.7)

## 2023-10-17 PROCEDURE — 80053 COMPREHEN METABOLIC PANEL: CPT | Performed by: HOSPITALIST

## 2023-10-17 PROCEDURE — 25000003 PHARM REV CODE 250: Performed by: NURSE PRACTITIONER

## 2023-10-17 PROCEDURE — 83930 ASSAY OF BLOOD OSMOLALITY: CPT | Performed by: HOSPITALIST

## 2023-10-17 PROCEDURE — 97162 PT EVAL MOD COMPLEX 30 MIN: CPT

## 2023-10-17 PROCEDURE — 97535 SELF CARE MNGMENT TRAINING: CPT

## 2023-10-17 PROCEDURE — 83735 ASSAY OF MAGNESIUM: CPT | Performed by: HOSPITALIST

## 2023-10-17 PROCEDURE — 99233 PR SUBSEQUENT HOSPITAL CARE,LEVL III: ICD-10-PCS | Mod: 95,,, | Performed by: HOSPITALIST

## 2023-10-17 PROCEDURE — 85025 COMPLETE CBC W/AUTO DIFF WBC: CPT | Performed by: HOSPITALIST

## 2023-10-17 PROCEDURE — 36415 COLL VENOUS BLD VENIPUNCTURE: CPT | Performed by: HOSPITALIST

## 2023-10-17 PROCEDURE — 99233 SBSQ HOSP IP/OBS HIGH 50: CPT | Mod: 95,,, | Performed by: HOSPITALIST

## 2023-10-17 PROCEDURE — 97166 OT EVAL MOD COMPLEX 45 MIN: CPT

## 2023-10-17 PROCEDURE — 25000003 PHARM REV CODE 250: Performed by: HOSPITALIST

## 2023-10-17 PROCEDURE — 97530 THERAPEUTIC ACTIVITIES: CPT

## 2023-10-17 PROCEDURE — 27000207 HC ISOLATION

## 2023-10-17 PROCEDURE — 11000001 HC ACUTE MED/SURG PRIVATE ROOM

## 2023-10-17 RX ORDER — LITHIUM CARBONATE 300 MG/1
600 TABLET, FILM COATED, EXTENDED RELEASE ORAL NIGHTLY
Status: DISCONTINUED | OUTPATIENT
Start: 2023-10-18 | End: 2023-10-18 | Stop reason: HOSPADM

## 2023-10-17 RX ADMIN — Medication 400 MG: at 09:10

## 2023-10-17 RX ADMIN — LEVETIRACETAM 1000 MG: 100 SOLUTION ORAL at 09:10

## 2023-10-17 RX ADMIN — RIFAXIMIN 550 MG: 550 TABLET ORAL at 09:10

## 2023-10-17 RX ADMIN — TIMOLOL MALEATE 1 DROP: 5 SOLUTION/ DROPS OPHTHALMIC at 09:10

## 2023-10-17 RX ADMIN — THERA TABS 1 TABLET: TAB at 09:10

## 2023-10-17 RX ADMIN — PANTOPRAZOLE SODIUM 40 MG: 40 GRANULE, DELAYED RELEASE ORAL at 09:10

## 2023-10-17 RX ADMIN — BRIMONIDINE TARTRATE 1 DROP: 1.5 SOLUTION OPHTHALMIC at 09:10

## 2023-10-17 RX ADMIN — FERROUS SULFATE TAB 325 MG (65 MG ELEMENTAL FE) 1 EACH: 325 (65 FE) TAB at 09:10

## 2023-10-17 NOTE — PT/OT/SLP EVAL
"Occupational Therapy   Evaluation and Tx    Name: Marely Hamilton  MRN: [id number]  Admitting Diagnosis: Urinary tract infection associated with indwelling urethral catheter  Recent Surgery: * No surgery found *      Recommendations:     Discharge Recommendations: Low Intensity Therapy  Discharge Equipment Recommendations:  bedside commode  Barriers to discharge:  Other (Comment) (increased skilled (A) rquired)    Assessment:     Marely Hamilton is a 58 y.o. female with a medical diagnosis of Urinary tract infection associated with indwelling urethral catheter.  She presents with the following performance deficits affecting function: weakness, impaired endurance, impaired self care skills, gait instability, impaired functional mobility, impaired balance, decreased safety awareness, decreased lower extremity function, decreased upper extremity function, impaired cognition.    Pt presents pleasant, eager to mobilize with therapy. Pt demo ability to perform bed mobility and upper body dressing with light assistance. Pt able to tolerate several sit to stand trials with RW, however further mobility limited by increased agitation. Ample education provided on the role of therapy in regaining strength in order to return to PLOF  but pt refused. Pt adamantly refused to return safe in bed, however eventually agreeable and returned with bed alarm on and sitter at bedside. Pt continues to benefit from skilled OT services in order to increase functional mobility and ADL performance at PLOF.    Rehab Prognosis: Good; patient would benefit from acute skilled OT services to address these deficits and reach maximum level of function.       Plan:     Patient to be seen 3 x/week to address the above listed problems via self-care/home management, therapeutic activities, therapeutic exercises, neuromuscular re-education  Plan of Care Expires: 11/16/23  Plan of Care Reviewed with:      Subjective   "Y'all are useless. Get out!"   Chief " "Complaint: Weakness   Patient/Family Comments/goals: Go home to personal nurse     Occupational Profile:  Living Environment: Pt admitted from [facility]. Pt reporting "I'm not returning there. I have a personal nurse waiting for me at home."   Previous level of function: Mod I with RW for household distances with w/c utilized for longer community distances. Pt reports she was (I) with ADLs.   Roles and Routines: JAMIL  Equipment Used at Home: wheelchair, walker, rolling  Assistance upon Discharge: Facility staff     Pain/Comfort:  Pain Rating 1: other (see comments) (none reported)  Pain Rating Post-Intervention 2: other (see comments) (non reported)    Patients cultural, spiritual, Tenriism conflicts given the current situation: no    Objective:     Communicated with: RN prior to session.  Patient found HOB elevated with perineural catheter upon OT entry to room.    General Precautions: Standard, fall, contact (PEC)  Orthopedic Precautions: N/A  Braces: N/A  Respiratory Status: Room air    Occupational Performance:    Bed Mobility:    Patient completed Supine to Sit with contact guard assistance  Patient completed Sit to Supine with contact guard assistance    Functional Mobility/Transfers:  Patient completed Sit <> Stand Transfer with minimum assistance  with  rolling walker   Functional Mobility: Not performed 2/2 pt refusing due to her BLE weakness. Pt given ample education from therapist, aide, and RN in the importance of mobility to aide in strengthening. However, continued to refuse and become more increasingly agitated.     Activities of Daily Living:  Upper Body Dressing: minimum assistance to don/doff paper scub top  Lower Body Dressing: moderate assistance to don paper scrub pants   Toileting: pt with indwelling catheter      Cognitive/Visual Perceptual:  Cognitive/Psychosocial Skills:     -       Follows Commands/attention:Easily distracted and Follows one-step commands  -       Communication: " clear/fluent  -       Safety awareness/insight to disability: impaired   Visual/Perceptual:      -JAMIL    Physical Exam:  Balance:    -           Static sitting balance: SPV EOB  - Dynamic sitting balance: SPV EOB   - Static standing balance: Min A with RW  - Dynamic standing balance:  Deferred   Postural examination/scapula alignment:    -       Rounded shoulders  Skin integrity: Visible skin intact  Edema:  None noted  Upper Extremity Range of Motion:     -       Right Upper Extremity: WFL  -       Left Upper Extremity: WFL   Strength:    -       Right Upper Extremity: WFL  -       Left Upper Extremity: WFL    AMPAC 6 Click ADL:  AMPAC Total Score: 18    Treatment & Education:  Performed x2 sit to stand transfers with RW. Able to tolerate ~15 sec in static standing before abruptly sitting down    Pt educated on:   Role of OT, POC, and d/c planning.   Safe transfer techniques and proper body mechanics for fall prevention and improved independence with functional transfers   Importance of OOB activities to increase endurance and tolerance for increased participation in daily ADLs.   Utilizing the call bell to request for assistance with all functional mobility to ensure safety during hospital stay.      Pt  verbalized understanding and all questions were addressed within the scope of OT.       Patient left HOB elevated with all lines intact, call button in reach, bed alarm on, RN notified, and sitter present    GOALS:   Multidisciplinary Problems       Occupational Therapy Goals          Problem: Occupational Therapy    Goal Priority Disciplines Outcome Interventions   Occupational Therapy Goal     OT, PT/OT Ongoing, Progressing    Description: Goals to be met by: 10/31/2023     Patient will increase functional independence with ADLs by performing:    UE Dressing with Supervision.  LE Dressing with Stand-by Assistance.  Grooming while standing at sink with Stand-by Assistance.  Toileting from bedside commode with  Stand-by Assistance for hygiene and clothing management.   Step transfer with RW and Stand-by Assistance  Toilet transfer to bedside commode with Stand-by Assistance.                           History:     Past Medical History:   Diagnosis Date    Addiction to drug     Alcohol abuse     Alcohol abuse, in remission 6/15/2015    14.5 weeks ago; AA weekly    Anemia     Anxiety 6/15/2015    Behavioral problem     Bipolar disorder     Bipolar disorder in remission 6/15/2015    Cirrhosis, Laennec's 6/15/2015    Depression     Encounter for blood transfusion     Epistaxis 6/15/2015    Fatigue     Glaucoma     Hematuria     Hepatic encephalopathy 6/15/2015    Hepatic enlargement     History of psychiatric care     History of psychiatric hospitalization     History of seizure 6/15/2015    1    hx of intentional Tylenol overdose 6/15/2015    2005- situational and hx of bipolar    Hx of psychiatric care     Macrocytic anemia 9/18/2015    6 units PRBC since June 2015    Jeana     Osteoarthritis     Other ascites 6/15/2015    Psychiatric exam requested by authority     Psychiatric problem     Psychosis 9/26/2019    Renal disorder     Seizures     Self-harming behavior     Suicide attempt     Therapy     Thrombocytopenia 6/15/2015         Past Surgical History:   Procedure Laterality Date    COSMETIC SURGERY      EMBOLIZATION N/A 6/11/2023    Procedure: EMBOLIZATION, BLOOD VESSEL;  Surgeon: Debbie Surgeon;  Location: [facility];  Service: Anesthesiology;  Laterality: N/A;    ESOPHAGOGASTRODUODENOSCOPY      ESOPHAGOGASTRODUODENOSCOPY N/A 7/6/2023    Procedure: EGD (ESOPHAGOGASTRODUODENOSCOPY);  Surgeon: Bernice Guillen MD;  Location: [address]);  Service: Endoscopy;  Laterality: N/A;    ESOPHAGOGASTRODUODENOSCOPY N/A 7/11/2023    Procedure: EGD (ESOPHAGOGASTRODUODENOSCOPY);  Surgeon: Rangel Broussard MD;  Location: [address]);  Service: Endoscopy;  Laterality: N/A;       Time Tracking:     OT Date of Treatment:  10/17/23  OT Start Time: 1411  OT Stop Time: 1438  OT Total Time (min): 27 min    Billable Minutes:Evaluation 15  Self Care/Home Management 12    10/17/2023  Co-evaluation/treatment performed due to patient's multiple deficits requiring two skilled therapists to appropriately and safely assess patient's strength, endurance, functional mobility, and ADL performance while facilitating functional tasks in addition to accommodating for patient's activity tolerance and medical acuity.      None known

## 2023-10-17 NOTE — ASSESSMENT & PLAN NOTE
Patient has Abnormal Magnesium: hypomagnesemia. Will continue to monitor electrolytes closely. Will replace the affected electrolytes and repeat labs to be done after interventions completed. The patient's magnesium results have been reviewed and are listed below.  Recent Labs   Lab 10/16/23  0456   MG 1.8      10/12: mag sulfate 2g IV x1, then oral magnesium 400 qd   -repeat level with morning labs.

## 2023-10-17 NOTE — PLAN OF CARE
PT Evaluation completed and PT POC established.    Problem: Physical Therapy  Goal: Physical Therapy Goal  Description: Goals to be met by: 2023     Patient will increase functional independence with mobility by performin. Supine to sit with Modified [place]  2. Sit to supine with Modified [place]  3. Sit to stand transfer with Supervision  4. Bed to chair transfer with Supervision using LRAD  5. Gait  x 50 feet with Contact Guard Assistance using LRAD.   6. Wheelchair propulsion x100 feet with Supervision using bilateral lower extremities    Outcome: Ongoing, Progressing

## 2023-10-17 NOTE — ASSESSMENT & PLAN NOTE
Patient has hyponatremia which is uncontrolled,We will aim to correct the sodium by 4-6mEq in 24 hours. We will monitor sodium Daily. The hyponatremia is due to Medications: lithium + Lasix (combo can increase lithium levels) . We will obtain the following studies: Urine sodium, urine osmolality, serum osmolality, lithium level. We will treat the hyponatremia with Fluid restriction of:  1 liter per day and Removal of offending medications. The patient's sodium results have been reviewed and are listed below.  Recent Labs   Lab 10/16/23  0456   *     Na 136 on admit and gradually dropping 131-> 130-> 129  Clinically euvolemic and no concern for polydipsia.  Psychiatry recs acknowledged. Lithium + Lasix combo can increase lithium blood levels-> lasix stopped, placed on 1L fluid restriction until normalized. Check urine+ serum osm.

## 2023-10-17 NOTE — ASSESSMENT & PLAN NOTE
Acute metabolic encephalopathy - RESOLVED  Hepatic encephalopathy - RESOLVED  History of recurrent UTIs with current admission for VRE  Chronic indwelling baugh catheter for urinary retention  -increased agitation and aggression, refusing home medications    -HDS and afebrile on admit  -admission labs:   -CBC with WBC 3, H/H 11.4/33.5, platelets 87 (all at or better than baseline). Chemistry with stable renal function, glucose 89, K 4.4, alk phose 157, TBili 3.9, AST 57, ALT 30 (all similar to baseline). Ammonia 52. TSH 0.115/FT4 1.12. PT/INR pending. Lithium level 0.1. Serum acetaminophen <3.0. Serum alcohol <10. UDS + benzos. UA concerning for infection.   -admission micro: VRE+, Zyvox PO started with ID consulted-> 3 day rx. Monitor cbc daily while admitted.  -admission diagnostics:   -CT A/P with cirrhosis with signs of portal hypertension including splenomegaly and portosystemic collaterals; cholelithiasis; decreased 9.8 cm left retroperitoneal collection, which could represent a hematoma or abscess; large colonic stool burden; no bowel obstruction or inflammation.  -PEC'd in ED -> continue PEC hold due to grave disability, CEC'd 10/12. Psychiatry evaluation - latest recs reviewed.  -continue home lithium, zyprexa, and PRN alprazolam   -med and dose adjustments per Psych  -continue home lactulose- reduced dose to 30g daily (goal 2-3 soft bms daily), rifaximin  -spironolactone and furosemide discontinued 10/16 due to low bp and hyponatremia.  -delirium and fall precautions
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
## 2023-10-17 NOTE — PLAN OF CARE
PT eval complete, plan of care established    2023    Problem: Physical Therapy  Goal: Physical Therapy Goal  Description: Goals to be met by: 2023     Patient will increase functional independence with mobility by performin. Supine to sit with moderate assistance  2. Sit to supine with moderate assistance  3. Sit to stand transfer with moderate assistance  4. Bed to chair transfer with maximal assistance using LRAD as needed  5. Gait  x 5 feet with maximal assistance using LRAD as needed  6. Sitting at edge of bed x8 minutes with Stand-by Assistance  7. Lower extremity exercise program x10 reps per handout, with independence   Outcome: Ongoing, Progressing      MEDICATIONS  (STANDING):  chlorhexidine 4% Liquid 1 Application(s) Topical <User Schedule>  enoxaparin Injectable 40 milliGRAM(s) SubCutaneous every 24 hours  folic acid Injectable 1 milliGRAM(s) IV Push daily  influenza   Vaccine 0.5 milliLiter(s) IntraMuscular once  insulin lispro (ADMELOG) corrective regimen sliding scale   SubCutaneous every 6 hours  pantoprazole  Injectable 40 milliGRAM(s) IV Push daily  piperacillin/tazobactam IVPB.. 3.375 Gram(s) IV Intermittent every 8 hours  thiamine Injectable 100 milliGRAM(s) IV Push daily    MEDICATIONS  (PRN):  LORazepam   Injectable 0.5 milliGRAM(s) IV Push every 4 hours PRN Anxiety

## 2023-10-17 NOTE — PT/OT/SLP EVAL
"Physical Therapy Co-Evaluation    Patient Name:  Marely Hamilton   MRN:  [id number]    Recommendations:     Discharge Recommendations: Low Intensity Therapy   Discharge Equipment Recommendations: bedside commode   Barriers to discharge: None    Assessment:     Marely Hamilton is a 58 y.o. female admitted with a medical diagnosis of Urinary tract infection associated with indwelling urethral catheter.  She presents with the following impairments/functional limitations: weakness, impaired endurance, impaired functional mobility, gait instability, impaired balance, decreased upper extremity function, decreased lower extremity function, decreased safety awareness.    Intermittently agitated, poor safety awareness, and poor insight to mobility deficits. Pt tolerated 2 reps STS with 1 person assist however unable to tolerate prolong standing <15s. Pt declined amb stating "my feet feel heavy, I cant do it right now". End of session pt sat EOB ~15 min adamantly declining returning to bed. With max encouragement, pt returned semi-reclined with alarm on. Pt will benefit from skilled PT services while in house in order to address the aforementioned deficits.      Rehab Prognosis: Good; patient would benefit from acute skilled PT services to address these deficits and reach maximum level of function.    Recent Surgery: * No surgery found *      Plan:     During this hospitalization, patient to be seen 3 x/week to address the identified rehab impairments via gait training, therapeutic activities, therapeutic exercises, wheelchair management/training, neuromuscular re-education and progress toward the following goals:    Plan of Care Expires:  11/17/23    Subjective     "I need to get into a wheelchair to go home. I hired a personal nurse to take care of me"    Pain/Comfort:  Pain Rating 1: 0/10    Patients cultural, spiritual, Orthodoxy conflicts given the current situation: no    Living Environment:  Per EMR, pt from [facility]. Pt " "stated "I'm not returning there. I have a private place with private nurse"  Prior to admission, patients level of function was mod I RW, w/c for long distances.  Equipment used at home: wheelchair, walker, rolling.  DME owned (not currently used): none.  Upon discharge, patient will have assistance from staff.    Objective:     Communicated with RN prior to session.  Patient found HOB elevated with baugh catheter  upon PT entry to room.    General Precautions: Standard, fall, contact  Orthopedic Precautions:N/A   Braces: N/A  Respiratory Status: Room air    Exams:  RLE ROM: WFL  RLE Strength: grossly 3/5  LLE ROM: WFL  LLE Strength: grossly 3/5    Functional Mobility:  Bed Mobility:     Supine to Sit: contact guard assistance  Sit to Supine: contact guard assistance  Transfers:     Sit to stand: minimal assist  with RW  Balance:   Good sitting balance  Fair static standing balance      AM-PAC 6 CLICK MOBILITY  Total Score:16       Treatment & Education:  2 reps STS, <15s static stand  Sat EOB supvn  Donned pants     Educated pt on PT role/POC  Educated pt on importance of OOB activity and daily ambulation   Pt educated on proper body mechanics, safety techniques, and energy conservation with PT facilitation and cueing throughout session   Pt verbalized understanding      Patient left HOB elevated with all lines intact, call button in reach, RN notified, and OT present.    GOALS:   Multidisciplinary Problems       Physical Therapy Goals          Problem: Physical Therapy    Goal Priority Disciplines Outcome Goal Variances Interventions   Physical Therapy Goal     PT, PT/OT Ongoing, Progressing     Description: Goals to be met by: 2023     Patient will increase functional independence with mobility by performin. Supine to sit with Modified [place]  2. Sit to supine with Modified [place]  3. Sit to stand transfer with Supervision  4. Bed to chair transfer with Supervision using LRAD  5. Gait  x " 50 feet with Contact Guard Assistance using LRAD.   6. Wheelchair propulsion x100 feet with Supervision using bilateral lower extremities                         History:     Past Medical History:   Diagnosis Date    Addiction to drug     Alcohol abuse     Alcohol abuse, in remission 6/15/2015    14.5 weeks ago; AA weekly    Anemia     Anxiety 6/15/2015    Behavioral problem     Bipolar disorder     Bipolar disorder in remission 6/15/2015    Cirrhosis, Laennec's 6/15/2015    Depression     Encounter for blood transfusion     Epistaxis 6/15/2015    Fatigue     Glaucoma     Hematuria     Hepatic encephalopathy 6/15/2015    Hepatic enlargement     History of psychiatric care     History of psychiatric hospitalization     History of seizure 6/15/2015    1    hx of intentional Tylenol overdose 6/15/2015    2005- situational and hx of bipolar    Hx of psychiatric care     Macrocytic anemia 9/18/2015    6 units PRBC since June 2015    Jeana     Osteoarthritis     Other ascites 6/15/2015    Psychiatric exam requested by authority     Psychiatric problem     Psychosis 9/26/2019    Renal disorder     Seizures     Self-harming behavior     Suicide attempt     Therapy     Thrombocytopenia 6/15/2015       Past Surgical History:   Procedure Laterality Date    COSMETIC SURGERY      EMBOLIZATION N/A 6/11/2023    Procedure: EMBOLIZATION, BLOOD VESSEL;  Surgeon: Debbie Surgeon;  Location: [facility];  Service: Anesthesiology;  Laterality: N/A;    ESOPHAGOGASTRODUODENOSCOPY      ESOPHAGOGASTRODUODENOSCOPY N/A 7/6/2023    Procedure: EGD (ESOPHAGOGASTRODUODENOSCOPY);  Surgeon: Bernice Guillen MD;  Location: [address]);  Service: Endoscopy;  Laterality: N/A;    ESOPHAGOGASTRODUODENOSCOPY N/A 7/11/2023    Procedure: EGD (ESOPHAGOGASTRODUODENOSCOPY);  Surgeon: Rangel Broussard MD;  Location: [address]);  Service: Endoscopy;  Laterality: N/A;       Time Tracking:     PT Received On: 10/17/23  PT Start Time: 1411     PT Stop  Time: 1438  PT Total Time (min): 27 min     Billable Minutes: Evaluation 10 and Therapeutic Activity 15    Co-treatment performed due to patient's multiple deficits requiring two skilled therapists to appropriately and safely assess patient's strength and endurance while facilitating functional tasks in addition to accommodating for patient's activity tolerance.       10/17/2023

## 2023-10-17 NOTE — PLAN OF CARE
Problem: Infection  Goal: Absence of Infection Signs and Symptoms  Outcome: Ongoing, Progressing     Problem: Adult Inpatient Plan of Care  Goal: Plan of Care Review  Outcome: Ongoing, Progressing  Goal: Patient-Specific Goal (Individualized)  Outcome: Ongoing, Progressing  Goal: Absence of Hospital-Acquired Illness or Injury  Outcome: Ongoing, Progressing  Goal: Optimal Comfort and Wellbeing  Outcome: Ongoing, Progressing  Goal: Readiness for Transition of Care  Outcome: Ongoing, Progressing     Problem: Adjustment to Illness (Sepsis/Septic Shock)  Goal: Optimal Coping  Outcome: Ongoing, Progressing     Problem: Bleeding (Sepsis/Septic Shock)  Goal: Absence of Bleeding  Outcome: Ongoing, Progressing     Problem: Glycemic Control Impaired (Sepsis/Septic Shock)  Goal: Blood Glucose Level Within Desired Range  Outcome: Ongoing, Progressing     Problem: Infection Progression (Sepsis/Septic Shock)  Goal: Absence of Infection Signs and Symptoms  Outcome: Ongoing, Progressing     Problem: Nutrition Impaired (Sepsis/Septic Shock)  Goal: Optimal Nutrition Intake  Outcome: Ongoing, Progressing     Problem: Fluid Imbalance (Pneumonia)  Goal: Fluid Balance  Outcome: Ongoing, Progressing     Problem: Infection (Pneumonia)  Goal: Resolution of Infection Signs and Symptoms  Outcome: Ongoing, Progressing     Problem: Respiratory Compromise (Pneumonia)  Goal: Effective Oxygenation and Ventilation  Outcome: Ongoing, Progressing     Problem: Skin Injury Risk Increased  Goal: Skin Health and Integrity  Outcome: Ongoing, Progressing     Problem: Impaired Wound Healing  Goal: Optimal Wound Healing  Outcome: Ongoing, Progressing     Problem: Violence Risk or Actual  Goal: Anger and Impulse Control  Outcome: Ongoing, Progressing       No significant changes on our shift. She just wants go home today and as per her, she hired a nurse at home to take care of her.

## 2023-10-17 NOTE — PROGRESS NOTES
"CONSULTATION LIAISON PSYCHIATRY PROGRESS NOTE    Patient Name: Marely Hamilton  MRN: [id number]  Patient Class: IP- Inpatient  Admission Date: 10/10/2023  Attending Physician: Kena Esqueda MD      SUBJECTIVE:   Marely Hamilton is a 58 y.o. female with past psychiatric history of bipolar disorder, alcohol use disorder & past pertinent medical history of seizure disorder, alcohol induced hepatic cirrhosis presents to the ED/admitted to the hospital for Urinary tract infection associated with indwelling urethral catheter.     Psychiatry consulted for "PEC'd for combative behavior - needs psych eval"     Patient received Xanax 0.25 mg PRN @ 2030 overnight for anxiety.   Lithium level 10/16/23: 0.3.    On psych exam, patient observed sitting in bed eating applesauce and does not appear to be in any acute distress. She is calm, cooperative, and pleassant throughout the exam. She reports feeling "better" and when prompted elaborates that she has an appetite and is sleeping well. She reports coming to the hospital because she "felt manic" but reports "that is over now." When asked how she knows she is no longer manic she endorses resolution of rapid/pressured speech and racing thoughts. She denies SI/HI/AVH. Denies any adverse effects from Lithium and reports a previous dose of 600 mg. She is discharge focussed and reporting plans to return home with home health nurse.     OBJECTIVE:    Mental Status Exam:  General Appearance: appears stated age, well developed and nourished, adequately groomed and appropriately dressed, in no acute distress  Behavior: normal; cooperative; reasonably friendly, pleasant, and polite; appropriate eye-contact; under good behavioral control  Involuntary Movements and Motor Activity: no abnormal involuntary movements noted; no tics, no tremors, no akathisia, no dystonia, no evidence of tardive dyskinesia; no psychomotor agitation or retardation  Gait and Station: unable to assess - patient " "lying down or seated  Speech and Language: slowed, monotone  Mood: "good"  Affect: constricted  Thought Process and Associations: linear, goal-directed, abstract (proverbs & similarities)  Thought Content and Perceptions:: no suicidal ideation, no homicidal ideation, no auditory hallucinations, no visual hallucinations  Sensorium and Orientation: oriented fully (to person, place, and time)  Recent and Remote Memory: grossly intact  Attention and Concentration: grossly intact, attentive to the conversation and not readily distractible  Fund of Knowledge: intact  Insight: intact  Judgment: intact    CAM ICU positive? no      ASSESSMENT & RECOMMENDATIONS      BIPOLAR I  PSYCH MEDICATIONS  Scheduled: Recommend increasing Lithium to 600 mg nightly and continuing Zyprexa 5 mg QHS  Li level: 0.3 on 10/16/23  Li level: 0.1 on 10/10/23  Recommend holding lasix while hyponatremic as patient refused her sodium bicarb as the lasix and lithium can both cause hyponatremia. However, would not want to stop lithium and disrupt her mood stability.   PRN: Not requiring PRN medications at this time.     DELIRIUM  DELIRIUM BEHAVIOR MANAGEMENT  PLEASE utilize  PRN meds first for agitation. Minimize use of PHYSICAL restraints OR have periods of being out of physical restraints if possible.  Keep window shades open and room lit during day and room dim at night in order to promote normal sleep-wake cycles  Encourage family at bedside. [place] patient often to situation, location, date.  Continue to Limit or Discontinue use of Narcotics, Benzos and Anti-cholinergic medications as they may worsen delirium.  Continue medical workup for causative etiology of Delirium.      RISK ASSESSMENT  NO NEED FOR PEC patient NOT in any imminent danger of hurting self or others and not gravely disabled.   However patient requiring 24 hour care at baseline per patients family, if patient were to decide to AMA prior to adequate dispo being arranged, recommend " low threshold for PEC.      FOLLOW UP  Will follow up while in house     DISPOSITION - once medically cleared:   Defer to medical team. Patient previously living at [facility]. Patients sister Zaria ([phone]) wishes to updated prior to discharge    Please contact ON CALL psychiatry service (24/7) for any acute issues that may arise.    Dr. Mary Tyson  [facility] PGY2  CL Psychiatry  [facility]  10/17/2023 7:15 AM        --------------------------------------------------------------------------------------------------------------------------------------------------------------------------------------------------------------------------------------    CONTINUED OBJECTIVE clinical data & findings reviewed and noted for above decision making    Current Medications:   Scheduled Meds:    brimonidine 0.15 % OPTH DROP  1 drop Both Eyes BID    And    timolol maleate 0.5%  1 drop Both Eyes BID    ferrous sulfate  1 tablet Oral Daily    lactulose  30 g Oral Daily    levetiracetam  1,000 mg Oral BID    lithium  300 mg Oral QHS    magnesium oxide  400 mg Oral Daily    multivitamin  1 tablet Oral Daily    OLANZapine  5 mg Oral QHS    pantoprazole  40 mg Oral BID    rifAXIMin  550 mg Oral BID     PRN Meds: acetaminophen, ALPRAZolam, ondansetron, ondansetron, sodium chloride 0.9%    Allergies:   Review of patient's allergies indicates:   Allergen Reactions    Sulfa (sulfonamide antibiotics) Rash    Codeine Nausea And Vomiting       Vitals  Vitals:    10/17/23 0737   BP: (!) 106/57   Pulse: 61   Resp:    Temp:        Labs/Imaging/Studies:  Recent Results (from the past 24 hour(s))   Osmolality, urine    Collection Time: 10/16/23  5:00 PM   Result Value Ref Range    Osmolality, Urine 302 50 - 1200 mOsm/kg   Sodium, urine, random    Collection Time: 10/16/23  5:00 PM   Result Value Ref Range    Sodium, Urine 81 20 - 250 mmol/L   Chloride, urine, random    Collection Time: 10/16/23  5:00 PM    Result Value Ref Range    Chloride, Urine 73 25 - 200 mmol/L   Osmolality, Serum    Collection Time: 10/16/23  5:11 PM   Result Value Ref Range    Osmolality 291 275 - 295 mOsm/kg   Lithium level    Collection Time: 10/16/23  8:43 PM   Result Value Ref Range    Lithium Level 0.3 (L) 0.6 - 1.2 mmol/L   CBC Auto Differential    Collection Time: 10/17/23  4:01 AM   Result Value Ref Range    WBC 2.66 (L) 3.90 - 12.70 K/uL    RBC 2.90 (L) 4.00 - 5.40 M/uL    Hemoglobin 10.3 (L) 12.0 - 16.0 g/dL    Hematocrit 30.0 (L) 37.0 - 48.5 %     (H) 82 - 98 fL    MCH 35.5 (H) 27.0 - 31.0 pg    MCHC 34.3 32.0 - 36.0 g/dL    RDW 15.0 (H) 11.5 - 14.5 %    Platelets 69 (L) 150 - 450 K/uL    MPV 9.5 9.2 - 12.9 fL    Immature Granulocytes 0.4 0.0 - 0.5 %    Gran # (ANC) 1.4 (L) 1.8 - 7.7 K/uL    Immature Grans (Abs) 0.01 0.00 - 0.04 K/uL    Lymph # 0.7 (L) 1.0 - 4.8 K/uL    Mono # 0.4 0.3 - 1.0 K/uL    Eos # 0.2 0.0 - 0.5 K/uL    Baso # 0.02 0.00 - 0.20 K/uL    nRBC 0 0 /100 WBC    Gran % 51.4 38.0 - 73.0 %    Lymph % 26.7 18.0 - 48.0 %    Mono % 14.3 4.0 - 15.0 %    Eosinophil % 6.4 0.0 - 8.0 %    Basophil % 0.8 0.0 - 1.9 %    Differential Method Automated    Comprehensive metabolic panel    Collection Time: 10/17/23  4:01 AM   Result Value Ref Range    Sodium 132 (L) 136 - 145 mmol/L    Potassium 4.5 3.5 - 5.1 mmol/L    Chloride 109 95 - 110 mmol/L    CO2 19 (L) 23 - 29 mmol/L    Glucose 91 70 - 110 mg/dL    BUN 21 (H) 6 - 20 mg/dL    Creatinine 0.6 0.5 - 1.4 mg/dL    Calcium 8.7 8.7 - 10.5 mg/dL    Total Protein 5.8 (L) 6.0 - 8.4 g/dL    Albumin 2.1 (L) 3.5 - 5.2 g/dL    Total Bilirubin 2.7 (H) 0.1 - 1.0 mg/dL    Alkaline Phosphatase 174 (H) 55 - 135 U/L    AST 49 (H) 10 - 40 U/L    ALT 28 10 - 44 U/L    eGFR >60.0 >60 mL/min/1.73 m^2    Anion Gap 4 (L) 8 - 16 mmol/L   Osmolality, Serum    Collection Time: 10/17/23  4:01 AM   Result Value Ref Range    Osmolality 295 275 - 295 mOsm/kg   Magnesium    Collection Time: 10/17/23   4:01 AM   Result Value Ref Range    Magnesium 1.9 1.6 - 2.6 mg/dL     Imaging Results              CT Abdomen Pelvis With Contrast (Final result)  Result time 10/11/23 09:19:53      Final result by Heriberto Claudio MD (10/11/23 09:19:53)                   Impression:      Cirrhosis with signs of portal hypertension including splenomegaly and portosystemic collaterals.    Cholelithiasis.    Decreased 9.8 cm left retroperitoneal collection, which could represent a hematoma or abscess.  The previous collection in the left iliacus muscle has resolved.    Large colonic stool burden.  No bowel obstruction or inflammation.    Other findings as described.      Electronically signed by: Heriberto Claudio  Date:    10/11/2023  Time:    09:19               Narrative:    EXAMINATION:  CT ABDOMEN PELVIS WITH CONTRAST    CLINICAL HISTORY:  Abdominal abscess/infection suspected;    TECHNIQUE:  Low dose axial images, sagittal and coronal reformations were obtained from the lung bases to the pubic symphysis following the IV administration of 75 mL of Omnipaque 350 .  Oral contrast was not given.    COMPARISON:  Multiple CTs, most recent 07/10/2023    FINDINGS:  LUNG BASES: Unremarkable.    HEPATOBILIARY: Cirrhotic liver morphology.  No focal hepatic lesions.  The gallbladder is collapsed around multiple gallstones.  No biliary ductal dilatation.    SPLEEN: Splenomegaly measuring 13.0 cm.    PANCREAS: No focal masses or ductal dilatation.    ADRENALS: No adrenal nodules.    KIDNEYS/URETERS: No hydronephrosis or stones.  There are subcentimeter hypodensities in both kidneys, which are too small to characterize but unchanged.    BLADDER/PELVIC ORGANS: Bladder is decompressed around a Saldaña catheter.  Uterus is unremarkable.  No adnexal mass.    PERITONEUM / RETROPERITONEUM: Diffuse mesenteric fat stranding.  No free fluid.  There is a 9.8 x 5.4 x 4.0 cm peripherally enhancing fluid collection in the left retroperitoneum (2:93), previously  15.1 x 10.8 x 9.4 cm.  The previous collection within the left iliacus muscle has resolved.  No free air.    LYMPH NODES: No lymphadenopathy.    VESSELS: Mild atherosclerosis.  There are embolization coils in the gastroduodenal artery.  Metal artifact degrades evaluation of adjacent structures.  Infrarenal IVC filter in place.  There are venous collaterals including esophageal varices and enlarged superior mesenteric and right gonadal veins.  Portal veins are patent.    GI TRACT: Large colonic stool burden.  No evidence of bowel obstruction or inflammation.  Normal appendix.    BONES AND SOFT TISSUES: Anasarca.  Generalized muscle atrophy.  Diffuse osteopenia.  Postoperative changes from left total hip arthroplasty.  No acute fracture or focal lesion.

## 2023-10-17 NOTE — HOSPITAL COURSE
Encephalopathy work up to look for reversible causes revealed VRE UTI - completed 3d course of Zyvox per ID's recs. Essential meds resumed. Non-compliant behavior observed, refusing essential medications such as lactulose which is necessary for ammonia clearance. She also had an episode of agitated behavior, refusing Vanc.  CEC'd 10/12. Psychiatry consulted to help with managing her bipolar medications. Her sodium level is also declining, attributed to lithium-lasix interaction. Psychiatry recommended holding lasix until sodium level is improved, lithium level repeated - dose increased to 600 daily at the time of discharge. CEC discontinued. Medically stable for discharge back to NH.

## 2023-10-17 NOTE — SUBJECTIVE & OBJECTIVE
Interval History: hyponatremia, dropping bp. No new symptoms.     Review of Systems   All other systems reviewed and are negative.    Objective:     Vital Signs (Most Recent):  Temp: 98.4 °F (36.9 °C) (10/16/23 1934)  Pulse: 73 (10/16/23 1934)  Resp: 18 (10/16/23 1934)  BP: (!) 96/55 (10/16/23 1934)  SpO2: 95 % (10/16/23 1934) Vital Signs (24h Range):  Temp:  [96.1 °F (35.6 °C)-98.5 °F (36.9 °C)] 98.4 °F (36.9 °C)  Pulse:  [62-73] 73  Resp:  [16-20] 18  SpO2:  [93 %-97 %] 95 %  BP: ()/(51-60) 96/55     Weight: 57.2 kg (126 lb)  Body mass index is 23.05 kg/m².    Intake/Output Summary (Last 24 hours) at 10/16/2023 2022  Last data filed at 10/16/2023 0547  Gross per 24 hour   Intake --   Output 550 ml   Net -550 ml         Physical Exam  Vitals and nursing note reviewed.   Constitutional:       General: She is sleeping.   Neurological:      Mental Status: She is easily aroused.             Significant Labs: All pertinent labs within the past 24 hours have been reviewed.  CBC:   Recent Labs   Lab 10/15/23  0844 10/16/23  0456   WBC 3.47* 2.96*   HGB 10.2* 10.3*   HCT 31.3* 30.9*   PLT 80* 73*     CMP:   Recent Labs   Lab 10/15/23  0844 10/16/23  0456   * 129*   K 5.4* 5.1    105   CO2 16* 18*   * 91   BUN 14 15   CREATININE 0.6 0.5   CALCIUM 8.6* 8.4*   PROT 5.7* 5.7*   ALBUMIN 2.2* 2.2*   BILITOT 3.6* 3.9*   ALKPHOS 190* 172*   AST 57* 50*   ALT 32 30   ANIONGAP 6* 6*       Significant Imaging: I have reviewed all pertinent imaging results/findings within the past 24 hours.

## 2023-10-17 NOTE — PLAN OF CARE
OT evaluation completed. OT POC and goals established.     Problem: Occupational Therapy  Goal: Occupational Therapy Goal  Description: Goals to be met by: 10/31/2023     Patient will increase functional independence with ADLs by performing:    UE Dressing with Supervision.  LE Dressing with Stand-by Assistance.  Grooming while standing at sink with Stand-by Assistance.  Toileting from bedside commode with Stand-by Assistance for hygiene and clothing management.   Step transfer with RW and Stand-by Assistance  Toilet transfer to bedside commode with Stand-by Assistance.      Outcome: Ongoing, Progressing

## 2023-10-18 VITALS
HEART RATE: 70 BPM | WEIGHT: 126 LBS | OXYGEN SATURATION: 94 % | RESPIRATION RATE: 17 BRPM | TEMPERATURE: 99 F | DIASTOLIC BLOOD PRESSURE: 56 MMHG | HEIGHT: 62 IN | SYSTOLIC BLOOD PRESSURE: 116 MMHG | BODY MASS INDEX: 23.19 KG/M2

## 2023-10-18 LAB
ALBUMIN SERPL BCP-MCNC: 2.2 G/DL (ref 3.5–5.2)
ALP SERPL-CCNC: 156 U/L (ref 55–135)
ALT SERPL W/O P-5'-P-CCNC: 27 U/L (ref 10–44)
ANION GAP SERPL CALC-SCNC: 4 MMOL/L (ref 8–16)
AST SERPL-CCNC: 47 U/L (ref 10–40)
BASOPHILS # BLD AUTO: 0.01 K/UL (ref 0–0.2)
BASOPHILS NFR BLD: 0.3 % (ref 0–1.9)
BILIRUB SERPL-MCNC: 2.7 MG/DL (ref 0.1–1)
BUN SERPL-MCNC: 22 MG/DL (ref 6–20)
CALCIUM SERPL-MCNC: 8.5 MG/DL (ref 8.7–10.5)
CHLORIDE SERPL-SCNC: 111 MMOL/L (ref 95–110)
CO2 SERPL-SCNC: 17 MMOL/L (ref 23–29)
CREAT SERPL-MCNC: 0.5 MG/DL (ref 0.5–1.4)
DIFFERENTIAL METHOD: ABNORMAL
EOSINOPHIL # BLD AUTO: 0.1 K/UL (ref 0–0.5)
EOSINOPHIL NFR BLD: 3.3 % (ref 0–8)
ERYTHROCYTE [DISTWIDTH] IN BLOOD BY AUTOMATED COUNT: 14.9 % (ref 11.5–14.5)
EST. GFR  (NO RACE VARIABLE): >60 ML/MIN/1.73 M^2
GLUCOSE SERPL-MCNC: 179 MG/DL (ref 70–110)
HCT VFR BLD AUTO: 28.5 % (ref 37–48.5)
HGB BLD-MCNC: 9.8 G/DL (ref 12–16)
IMM GRANULOCYTES # BLD AUTO: 0 K/UL (ref 0–0.04)
IMM GRANULOCYTES NFR BLD AUTO: 0 % (ref 0–0.5)
LYMPHOCYTES # BLD AUTO: 0.6 K/UL (ref 1–4.8)
LYMPHOCYTES NFR BLD: 18.3 % (ref 18–48)
MAGNESIUM SERPL-MCNC: 1.9 MG/DL (ref 1.6–2.6)
MCH RBC QN AUTO: 36.4 PG (ref 27–31)
MCHC RBC AUTO-ENTMCNC: 34.4 G/DL (ref 32–36)
MCV RBC AUTO: 106 FL (ref 82–98)
MONOCYTES # BLD AUTO: 0.4 K/UL (ref 0.3–1)
MONOCYTES NFR BLD: 12 % (ref 4–15)
NEUTROPHILS # BLD AUTO: 2 K/UL (ref 1.8–7.7)
NEUTROPHILS NFR BLD: 66.1 % (ref 38–73)
NRBC BLD-RTO: 0 /100 WBC
OSMOLALITY SERPL: 296 MOSM/KG (ref 275–295)
PLATELET # BLD AUTO: 61 K/UL (ref 150–450)
PMV BLD AUTO: 8.9 FL (ref 9.2–12.9)
POTASSIUM SERPL-SCNC: 3.3 MMOL/L (ref 3.5–5.1)
PROT SERPL-MCNC: 5.7 G/DL (ref 6–8.4)
RBC # BLD AUTO: 2.69 M/UL (ref 4–5.4)
SODIUM SERPL-SCNC: 132 MMOL/L (ref 136–145)
WBC # BLD AUTO: 3 K/UL (ref 3.9–12.7)

## 2023-10-18 PROCEDURE — 80053 COMPREHEN METABOLIC PANEL: CPT | Performed by: HOSPITALIST

## 2023-10-18 PROCEDURE — 25000003 PHARM REV CODE 250: Performed by: NURSE PRACTITIONER

## 2023-10-18 PROCEDURE — 36415 COLL VENOUS BLD VENIPUNCTURE: CPT | Performed by: HOSPITALIST

## 2023-10-18 PROCEDURE — 85025 COMPLETE CBC W/AUTO DIFF WBC: CPT | Performed by: HOSPITALIST

## 2023-10-18 PROCEDURE — 99239 PR HOSPITAL DISCHARGE DAY,>30 MIN: ICD-10-PCS | Mod: ,,, | Performed by: HOSPITALIST

## 2023-10-18 PROCEDURE — 25000003 PHARM REV CODE 250: Performed by: HOSPITALIST

## 2023-10-18 PROCEDURE — 83735 ASSAY OF MAGNESIUM: CPT | Performed by: HOSPITALIST

## 2023-10-18 PROCEDURE — 1111F DSCHRG MED/CURRENT MED MERGE: CPT | Mod: CPTII,,, | Performed by: HOSPITALIST

## 2023-10-18 PROCEDURE — 99239 HOSP IP/OBS DSCHRG MGMT >30: CPT | Mod: ,,, | Performed by: HOSPITALIST

## 2023-10-18 PROCEDURE — 1111F PR DISCHARGE MEDS RECONCILED W/ CURRENT OUTPATIENT MED LIST: ICD-10-PCS | Mod: CPTII,,, | Performed by: HOSPITALIST

## 2023-10-18 PROCEDURE — 83930 ASSAY OF BLOOD OSMOLALITY: CPT | Performed by: HOSPITALIST

## 2023-10-18 RX ORDER — LITHIUM CARBONATE 300 MG/1
600 TABLET, FILM COATED, EXTENDED RELEASE ORAL NIGHTLY
Qty: 60 TABLET | Refills: 0 | Status: ON HOLD | OUTPATIENT
Start: 2023-10-18 | End: 2023-12-08 | Stop reason: HOSPADM

## 2023-10-18 RX ADMIN — BRIMONIDINE TARTRATE 1 DROP: 1.5 SOLUTION OPHTHALMIC at 12:10

## 2023-10-18 RX ADMIN — RIFAXIMIN 550 MG: 550 TABLET ORAL at 12:10

## 2023-10-18 RX ADMIN — TIMOLOL MALEATE 1 DROP: 5 SOLUTION/ DROPS OPHTHALMIC at 12:10

## 2023-10-18 RX ADMIN — POTASSIUM BICARBONATE 50 MEQ: 978 TABLET, EFFERVESCENT ORAL at 09:10

## 2023-10-18 RX ADMIN — OLANZAPINE 5 MG: 5 TABLET, FILM COATED ORAL at 12:10

## 2023-10-18 NOTE — ASSESSMENT & PLAN NOTE
Acute metabolic encephalopathy - RESOLVED  Hepatic encephalopathy - RESOLVED  History of recurrent UTIs with current admission for VRE - completed Zyvox  Chronic indwelling baugh catheter for urinary retention  -increased agitation and aggression, refusing home medications    -HDS and afebrile on admit  -admission labs:   -CBC with WBC 3, H/H 11.4/33.5, platelets 87 (all at or better than baseline). Chemistry with stable renal function, glucose 89, K 4.4, alk phose 157, TBili 3.9, AST 57, ALT 30 (all similar to baseline). Ammonia 52. TSH 0.115/FT4 1.12. PT/INR pending. Lithium level 0.1. Serum acetaminophen <3.0. Serum alcohol <10. UDS + benzos. UA concerning for infection.   -admission micro: VRE+, Zyvox PO started with ID consulted-> 3 day rx. Monitor cbc daily while admitted.  -admission diagnostics:   -CT A/P with cirrhosis with signs of portal hypertension including splenomegaly and portosystemic collaterals; cholelithiasis; decreased 9.8 cm left retroperitoneal collection, which could represent a hematoma or abscess; large colonic stool burden; no bowel obstruction or inflammation.  -PEC'd in ED -> continue PEC hold due to grave disability, CEC'd 10/12. Psychiatry evaluation - latest recs reviewed. PEC discontinued 10/16.  -continue home lithium, zyprexa, and PRN alprazolam   -med and dose adjustments per Psych  -continue home lactulose- reduced dose to 30g daily (goal 2-3 soft bms daily), rifaximin  -spironolactone and furosemide discontinued 10/16 due to low bp and hyponatremia.  -delirium and fall precautions

## 2023-10-18 NOTE — ASSESSMENT & PLAN NOTE
Patient has hyponatremia which is uncontrolled,We will aim to correct the sodium by 4-6mEq in 24 hours. We will monitor sodium Daily. The hyponatremia is due to Medications: lithium + Lasix (combo can increase lithium levels) . We will obtain the following studies: Urine sodium, urine osmolality, serum osmolality, lithium level. We will treat the hyponatremia with Fluid restriction of:  1 liter per day and Removal of offending medications. The patient's sodium results have been reviewed and are listed below.  Recent Labs   Lab 10/17/23  0401   *     Na 136 on admit and gradually dropping 131-> 130-> 129  Clinically euvolemic and no concern for polydipsia.  Psychiatry recs acknowledged. Lithium + Lasix combo can increase lithium blood levels-> lasix stopped, placed on 1L fluid restriction until normalized. Check urine+ serum osm - completed.  Continue holding Lasix until sodium levels have normalized.  Lithium dose adjustments per psych.

## 2023-10-18 NOTE — PLAN OF CARE
[facility] - Med Surg  Discharge Final Note    Primary Care Provider: Viktor Ross MD    Expected Discharge Date: 10/18/2023      Patient discharged back to [facility] via ambulance. No post acute needs.   Final Discharge Note (most recent)       Final Note - 10/18/23 1450          Final Note    Assessment Type Final Discharge Note (P)      Anticipated Discharge Disposition Intermediate Care Facility for residential or Supportive Care (P)         Post-Acute Status    Discharge Delays None known at this time (P)                      Important Message from Medicare  Important Message from Medicare regarding Discharge Appeal Rights: Given to patient/caregiver, Explained to patient/caregiver, Signed/date by patient/caregiver     Date IMM was signed: 10/17/23  Time IMM was signed: 1058    Contact Info       Viktor Ross MD   Specialty: Family Medicine   Relationship: PCP - General    [address] [place], [place] [id number]   Phone: [phone]       Next Steps: Go on 10/19/2023    Instructions: Hospital follow up at 8:00 am            NORMA Blanton  Case Management  [phone]
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
## 2023-10-18 NOTE — PLAN OF CARE
APPOINTMENT:    Patient Appointment(s) scheduled with  Viktor Ross MD, on Thursday Oct 19, 2023 at 8:00 am

## 2023-10-18 NOTE — SUBJECTIVE & OBJECTIVE
"Interval History: PEC discontinued, sodium improved to 132 this morning, uncooperative and angry today, stating that her feet hurts from "fluid buildup" since stopping lasix and aldactone. She asked to walk, so pt/ot consult ordered, but patient refused to work with them because of her feet hurting. She has no peripheral edema on evaluation. She has told staff that she is signing AMA paperwork, although she lives in a nursing home and gravely disabled, requiring assistance with adls ATC.     Review of Systems   Constitutional:  Negative for chills and fever.   Respiratory:  Negative for shortness of breath.    Gastrointestinal:  Negative for abdominal distention, abdominal pain, constipation, diarrhea, nausea and vomiting.   All other systems reviewed and are negative.    Objective:     Vital Signs (Most Recent):  Temp: 98.2 °F (36.8 °C) (10/17/23 1531)  Pulse: 70 (10/17/23 1531)  Resp: 18 (10/17/23 1531)  BP: (!) 103/57 (10/17/23 1531)  SpO2: (!) 94 % (10/17/23 1531) Vital Signs (24h Range):  Temp:  [97.6 °F (36.4 °C)-98.2 °F (36.8 °C)] 98.2 °F (36.8 °C)  Pulse:  [60-71] 70  Resp:  [16-18] 18  SpO2:  [94 %-97 %] 94 %  BP: ()/(53-58) 103/57     Weight: 57.2 kg (126 lb)  Body mass index is 23.05 kg/m².    Intake/Output Summary (Last 24 hours) at 10/17/2023 2152  Last data filed at 10/17/2023 0610  Gross per 24 hour   Intake --   Output 550 ml   Net -550 ml         Physical Exam  Vitals and nursing note reviewed.   Constitutional:       General: She is awake. She is not in acute distress.     Appearance: She is cachectic. She is ill-appearing.   HENT:      Head: Normocephalic and atraumatic.   Eyes:      General: No scleral icterus.     Extraocular Movements: Extraocular movements intact.   Cardiovascular:      Rate and Rhythm: Normal rate and regular rhythm.   Pulmonary:      Effort: Pulmonary effort is normal.   Abdominal:      General: Abdomen is flat. Bowel sounds are normal. There is no distension.      " Palpations: Abdomen is soft.      Tenderness: There is no abdominal tenderness. There is no guarding or rebound.   Musculoskeletal:      Right lower leg: No edema.      Left lower leg: No edema.   Skin:     General: Skin is warm.   Neurological:      Mental Status: She is alert. Mental status is at baseline.      Cranial Nerves: No cranial nerve deficit.   Psychiatric:         Mood and Affect: Affect is blunt.         Speech: Speech normal.         Behavior: Behavior is uncooperative.             Significant Labs: All pertinent labs within the past 24 hours have been reviewed.  CBC:   Recent Labs   Lab 10/16/23  0456 10/17/23  0401   WBC 2.96* 2.66*   HGB 10.3* 10.3*   HCT 30.9* 30.0*   PLT 73* 69*     CMP:   Recent Labs   Lab 10/16/23  0456 10/17/23  0401   * 132*   K 5.1 4.5    109   CO2 18* 19*   GLU 91 91   BUN 15 21*   CREATININE 0.5 0.6   CALCIUM 8.4* 8.7   PROT 5.7* 5.8*   ALBUMIN 2.2* 2.1*   BILITOT 3.9* 2.7*   ALKPHOS 172* 174*   AST 50* 49*   ALT 30 28   ANIONGAP 6* 4*       Significant Imaging: I have reviewed all pertinent imaging results/findings within the past 24 hours.

## 2023-10-18 NOTE — PLAN OF CARE
NURSING HOME ORDERS    10/18/2023  [facility]  [address] [address]  Dept: [phone]  Loc: [phone]     Admit to Nursing Home:  Intermediate Care Facili*    Diagnoses:  Active Hospital Problems    Diagnosis  POA    *Urinary tract infection associated with indwelling urethral catheter [T83.511A, N39.0]  Yes     Priority: 23     VRE (vancomycin-resistant Enterococci) infection [A49.1, Z16.21]  Yes     Priority: 24     Hypomagnesemia [E83.42]  No    Chronic indwelling Saldaña catheter [Z97.8]  Not Applicable    Presence of IVC filter [Z95.828]  Not Applicable    Chronic deep vein thrombosis (DVT) of right lower extremity [I82.501]  Yes    Urinary retention [R33.9]  Yes    Anemia of chronic disease [D63.8]  Yes    Acute metabolic encephalopathy [G93.41]  Yes    Bipolar 1 disorder, depressed, severe [F31.4]  Yes    Alcoholic cirrhosis [K70.30]  Yes     Last Assessment & Plan:   Formatting of this note might be different from the original.  This is reportedly due to tylenol OD and alcohol abuse per her sister. She has severe disease with elevated INR and MELD score of 25. She is at her baseline per her PCP. She was continued on rifaximin and encouraged to take lactulose. She was started on propranolol temporary for significant tachycardia. She is reportedly followed by Dr. Colt PATTEN, at [facility].  Formatting of this note might be different from the original.  Last Assessment & Plan:   Formatting of this note might be different from the original.  This is reportedly due to tylenol OD and alcohol abuse per her sister. She has severe disease with elevated INR and MELD score of 25. She is at her baseline per her PCP. She was continued on rifaximin and encouraged to take lactulose. She was started on propranolol temporary for significant tachycardia. She is reportedly followed by Dr. Colt PATTEN, at [facility].      Hepatic encephalopathy  "[K76.82]  Yes    History of seizure [Z87.898]  Yes     One seizure 2013- "low blood sugar from a starvation diet"      Thrombocytopenia [D69.6]  Yes    Hyponatremia [E87.1]  No      Resolved Hospital Problems   No resolved problems to display.       Patient is homebound due to:  Urinary tract infection associated with indwelling urethral catheter    Allergies:  Review of patient's allergies indicates:   Allergen Reactions    Sulfa (sulfonamide antibiotics) Rash    Codeine Nausea And Vomiting       Vitals:  Routine    Diet: regular diet and fluid restriction: 1.5L free water restriction    Activities:   Activity as tolerated    Goals of Care Treatment Preferences:  Code Status: Full Code    Health care agent: Pt's sisters are NOK. Call Zaria first.  Health care agent number: No value filed.                   Labs: BMP qweekly for 2 weeks - report abnormal labs to PCP    Nursing Precautions:  Fall, Seizure, and Pressure ulcer prevention    Consults:   PT to evaluate and treat- 3 times a week, OT to evaluate and treat- 3 times a week, and Nutrition to evaluate and recommend diet     Miscellaneous Care: Routine Skin for Bedridden Patients:  Apply moisture barrier cream to all                 Medications: Discontinue all previous medication orders, if any. See new list below.     Medication List        CHANGE how you take these medications      lithium 300 MG CR tablet  Commonly known as: LITHOBID  Take 2 tablets (600 mg total) by mouth every evening.  What changed: how much to take            CONTINUE taking these medications      ALPRAZolam 0.25 MG tablet  Commonly known as: XANAX  Take 1 tablet (0.25 mg total) by mouth 2 (two) times daily as needed for Anxiety.     brimonidine-timoloL 0.2-0.5 % Drop  Commonly known as: COMBIGAN  Place 1 drop into both eyes 2 (two) times a day.     ferrous sulfate 325 mg (65 mg iron) Tab tablet  Commonly known as: FEOSOL  Take 325 mg by mouth once daily.     furosemide 20 MG " tablet  Commonly known as: LASIX  Take 20 mg by mouth once daily.     lactulose 20 gram/30 mL Soln  Commonly known as: CHRONULAC  Take 45 mLs (30 g total) by mouth 3 (three) times daily. TITRATE TO 3-4 BOWEL MOVEMENTS DAILY.     levetiracetam 500 mg/5 mL (5 mL) Soln  Take 10 mLs (1,000 mg total) by mouth 2 (two) times daily.     miconazole nitrate 2% 2 % Oint  Commonly known as: MICOTIN  Apply topically 2 (two) times daily.     OLANZapine 5 MG tablet  Commonly known as: ZyPREXA  Take 1 tablet (5 mg total) by mouth every evening.     pantoprazole 40 mg suspension  Commonly known as: PROTONIX  Take 1 packet (40 mg total) by mouth 2 (two) times daily.     rifAXIMin 550 mg Tab  Commonly known as: XIFAXAN  Take 1 tablet (550 mg total) by mouth 2 (two) times daily.     spironolactone 100 MG tablet  Commonly known as: ALDACTONE  Take 1 tablet (100 mg total) by mouth once daily.                Immunizations Administered as of 10/18/2023       Name Date Dose VIS Date Route Exp Date    COVID-19, MRNA, LN-S, PF (Moderna) 1/26/2022 0.5 mL -- Intramuscular --    Site: Right arm     Lot: [id number]     COVID-19, MRNA, LN-S, PF (Moderna) 5/6/2021 11:23 AM 0.5 mL 12/19/2020 Intramuscular 10/2/2021    Site: Right deltoid     Given By: Radha Chan PharmD     : Modern[employer]     Lot: [id number]     COVID-19, MRNA, LN-S, PF (Moderna) 4/8/2021 11:16 AM 0.5 mL 12/19/2020 Intramuscular 9/17/2021    Site: Right deltoid     Given By: Beatris Friend     : Modern[employer]     Lot: [id number]             This patient has had both covid vaccinations    Some patients may experience side effects after vaccination.  These may include fever, headache, muscle or joint aches.  Most symptoms resolve with 24-48 hours and do not require urgent medical evaluation unless they persist for more than 72 hours or symptoms are concerning for an unrelated medical condition.          _________________________________  Kena Esqueda  MD  10/18/2023

## 2023-10-18 NOTE — PLAN OF CARE
Transportation is set up for 12:30 pm for patient to return back to [facility]. Patient's sister Zaria was notified. Nurse aware and has called in report to facility.       NORMA Blanton  Case Management  [phone]

## 2023-10-18 NOTE — PROGRESS NOTES
"CONSULTATION LIAISON PSYCHIATRY PROGRESS NOTE    Patient Name: Marely Hamilton  MRN: [id number]  Patient Class: IP- Inpatient  Admission Date: 10/10/2023  Attending Physician: Kena Esqueda MD      SUBJECTIVE:   Marely Hamilton is a 58 y.o. female with past psychiatric history of bipolar disorder, alcohol use disorder & past pertinent medical history of seizure disorder, alcohol induced hepatic cirrhosis presents to the ED/admitted to the hospital for Urinary tract infection associated with indwelling urethral catheter.     Psychiatry consulted for "PEC'd for combative behavior - needs psych eval"     Patient refused Lithium overnight but compliant with scheduled Zyprexa in the early morning around 0017. No psychotropic PRNs received overnight. Per PT/OT notes, patient agitated during sessions.     This morning, patient evaluated at bedside and food observed strewn across the bed. Patient uses a profanity to describe her current mood and states this is because she wants grits and butter. She requests multiple food items throughout exam and becomes agitated when the interviewer continues to attempt to interview. She reports she received the Lithium overnight but that she will not take it again until she is discharged. She denies any adverse effects from medications. Interview terminated per patient request.     OBJECTIVE:    Mental Status Exam:  General Appearance: appears stated age, well developed and nourished, adequately groomed and appropriately dressed, in no acute distress  Behavior: agitated; uncooperative; appropriate eye-contact; under good behavioral control  Involuntary Movements and Motor Activity: no abnormal involuntary movements noted; no tics, no tremors, no akathisia, no dystonia, no evidence of tardive dyskinesia; no psychomotor agitation or retardation  Gait and Station: unable to assess - patient lying down or seated  Speech and Language: lowed, profane  Mood: "shitty"  Affect: dysphoric, " irritable  Thought Process and Associations: linear, goal-directed, abstract (proverbs & similarities)  Thought Content and Perceptions:: no suicidal ideation, no homicidal ideation, no auditory hallucinations, no visual hallucinations  Sensorium and Orientation: oriented fully (to person, place, and time)  Recent and Remote Memory: grossly intact  Attention and Concentration: grossly intact, attentive to the conversation and not readily distractible  Fund of Knowledge: intact  Insight: limited  Judgment: limited    CAM ICU positive? no      ASSESSMENT & RECOMMENDATIONS      BIPOLAR I  PSYCH MEDICATIONS  Scheduled: Recommend Lithium 600 mg nightly and continuing Zyprexa 5 mg QHS  Li level: 0.3 on 10/16/23  Li level: 0.1 on 10/10/23  Recommend holding lasix while hyponatremic as patient refused her sodium bicarb as the lasix and lithium can both cause hyponatremia. However, would not want to stop lithium and disrupt her mood stability.   PRN: Not requiring PRN medications at this time.     DELIRIUM  DELIRIUM BEHAVIOR MANAGEMENT  PLEASE utilize  PRN meds first for agitation. Minimize use of PHYSICAL restraints OR have periods of being out of physical restraints if possible.  Keep window shades open and room lit during day and room dim at night in order to promote normal sleep-wake cycles  Encourage family at bedside. [place] patient often to situation, location, date.  Continue to Limit or Discontinue use of Narcotics, Benzos and Anti-cholinergic medications as they may worsen delirium.  Continue medical workup for causative etiology of Delirium.      RISK ASSESSMENT  NO NEED FOR PEC patient NOT in any imminent danger of hurting self or others and not gravely disabled.   However patient requiring 24 hour care at baseline per patients family, if patient were to decide to AMA prior to adequate dispo being arranged, recommend low threshold for PEC.      FOLLOW UP  Will follow up while in house     DISPOSITION - once  medically cleared:   Defer to medical team. Patient previously living at [facility]. Patients sister Zaria ([phone]) wishes to updated prior to discharge    Please contact ON CALL psychiatry service (24/7) for any acute issues that may arise.    Dr. Mary Tyson  [facility] PGY2  CL Psychiatry  [facility]  10/18/2023 7:15 AM        --------------------------------------------------------------------------------------------------------------------------------------------------------------------------------------------------------------------------------------    CONTINUED OBJECTIVE clinical data & findings reviewed and noted for above decision making    Current Medications:   Scheduled Meds:    brimonidine 0.15 % OPTH DROP  1 drop Both Eyes BID    And    timolol maleate 0.5%  1 drop Both Eyes BID    ferrous sulfate  1 tablet Oral Daily    lactulose  30 g Oral Daily    levetiracetam  1,000 mg Oral BID    lithium  600 mg Oral QHS    magnesium oxide  400 mg Oral Daily    multivitamin  1 tablet Oral Daily    OLANZapine  5 mg Oral QHS    pantoprazole  40 mg Oral BID    rifAXIMin  550 mg Oral BID     PRN Meds: acetaminophen, ALPRAZolam, ondansetron, ondansetron, sodium chloride 0.9%    Allergies:   Review of patient's allergies indicates:   Allergen Reactions    Sulfa (sulfonamide antibiotics) Rash    Codeine Nausea And Vomiting       Vitals  Vitals:    10/18/23 0725   BP: (!) 117/58   Pulse: 67   Resp: 18   Temp: 97.1 °F (36.2 °C)       Labs/Imaging/Studies:  Recent Results (from the past 24 hour(s))   CBC Auto Differential    Collection Time: 10/18/23  3:39 AM   Result Value Ref Range    WBC 3.00 (L) 3.90 - 12.70 K/uL    RBC 2.69 (L) 4.00 - 5.40 M/uL    Hemoglobin 9.8 (L) 12.0 - 16.0 g/dL    Hematocrit 28.5 (L) 37.0 - 48.5 %     (H) 82 - 98 fL    MCH 36.4 (H) 27.0 - 31.0 pg    MCHC 34.4 32.0 - 36.0 g/dL    RDW 14.9 (H) 11.5 - 14.5 %    Platelets 61 (L) 150 - 450 K/uL    MPV  8.9 (L) 9.2 - 12.9 fL    Immature Granulocytes 0.0 0.0 - 0.5 %    Gran # (ANC) 2.0 1.8 - 7.7 K/uL    Immature Grans (Abs) 0.00 0.00 - 0.04 K/uL    Lymph # 0.6 (L) 1.0 - 4.8 K/uL    Mono # 0.4 0.3 - 1.0 K/uL    Eos # 0.1 0.0 - 0.5 K/uL    Baso # 0.01 0.00 - 0.20 K/uL    nRBC 0 0 /100 WBC    Gran % 66.1 38.0 - 73.0 %    Lymph % 18.3 18.0 - 48.0 %    Mono % 12.0 4.0 - 15.0 %    Eosinophil % 3.3 0.0 - 8.0 %    Basophil % 0.3 0.0 - 1.9 %    Differential Method Automated    Comprehensive metabolic panel    Collection Time: 10/18/23  3:39 AM   Result Value Ref Range    Sodium 132 (L) 136 - 145 mmol/L    Potassium 3.3 (L) 3.5 - 5.1 mmol/L    Chloride 111 (H) 95 - 110 mmol/L    CO2 17 (L) 23 - 29 mmol/L    Glucose 179 (H) 70 - 110 mg/dL    BUN 22 (H) 6 - 20 mg/dL    Creatinine 0.5 0.5 - 1.4 mg/dL    Calcium 8.5 (L) 8.7 - 10.5 mg/dL    Total Protein 5.7 (L) 6.0 - 8.4 g/dL    Albumin 2.2 (L) 3.5 - 5.2 g/dL    Total Bilirubin 2.7 (H) 0.1 - 1.0 mg/dL    Alkaline Phosphatase 156 (H) 55 - 135 U/L    AST 47 (H) 10 - 40 U/L    ALT 27 10 - 44 U/L    eGFR >60.0 >60 mL/min/1.73 m^2    Anion Gap 4 (L) 8 - 16 mmol/L   Magnesium    Collection Time: 10/18/23  3:39 AM   Result Value Ref Range    Magnesium 1.9 1.6 - 2.6 mg/dL   Osmolality, Serum    Collection Time: 10/18/23  3:39 AM   Result Value Ref Range    Osmolality 296 (H) 275 - 295 mOsm/kg     Imaging Results              CT Abdomen Pelvis With Contrast (Final result)  Result time 10/11/23 09:19:53      Final result by Heriberto Claudio MD (10/11/23 09:19:53)                   Impression:      Cirrhosis with signs of portal hypertension including splenomegaly and portosystemic collaterals.    Cholelithiasis.    Decreased 9.8 cm left retroperitoneal collection, which could represent a hematoma or abscess.  The previous collection in the left iliacus muscle has resolved.    Large colonic stool burden.  No bowel obstruction or inflammation.    Other findings as  described.      Electronically signed by: Heriberto Claudio  Date:    10/11/2023  Time:    09:19               Narrative:    EXAMINATION:  CT ABDOMEN PELVIS WITH CONTRAST    CLINICAL HISTORY:  Abdominal abscess/infection suspected;    TECHNIQUE:  Low dose axial images, sagittal and coronal reformations were obtained from the lung bases to the pubic symphysis following the IV administration of 75 mL of Omnipaque 350 .  Oral contrast was not given.    COMPARISON:  Multiple CTs, most recent 07/10/2023    FINDINGS:  LUNG BASES: Unremarkable.    HEPATOBILIARY: Cirrhotic liver morphology.  No focal hepatic lesions.  The gallbladder is collapsed around multiple gallstones.  No biliary ductal dilatation.    SPLEEN: Splenomegaly measuring 13.0 cm.    PANCREAS: No focal masses or ductal dilatation.    ADRENALS: No adrenal nodules.    KIDNEYS/URETERS: No hydronephrosis or stones.  There are subcentimeter hypodensities in both kidneys, which are too small to characterize but unchanged.    BLADDER/PELVIC ORGANS: Bladder is decompressed around a Saldaña catheter.  Uterus is unremarkable.  No adnexal mass.    PERITONEUM / RETROPERITONEUM: Diffuse mesenteric fat stranding.  No free fluid.  There is a 9.8 x 5.4 x 4.0 cm peripherally enhancing fluid collection in the left retroperitoneum (2:93), previously 15.1 x 10.8 x 9.4 cm.  The previous collection within the left iliacus muscle has resolved.  No free air.    LYMPH NODES: No lymphadenopathy.    VESSELS: Mild atherosclerosis.  There are embolization coils in the gastroduodenal artery.  Metal artifact degrades evaluation of adjacent structures.  Infrarenal IVC filter in place.  There are venous collaterals including esophageal varices and enlarged superior mesenteric and right gonadal veins.  Portal veins are patent.    GI TRACT: Large colonic stool burden.  No evidence of bowel obstruction or inflammation.  Normal appendix.    BONES AND SOFT TISSUES: Anasarca.  Generalized muscle atrophy.   Diffuse osteopenia.  Postoperative changes from left total hip arthroplasty.  No acute fracture or focal lesion.

## 2023-10-20 NOTE — ASSESSMENT & PLAN NOTE
Home regimen: lithium and olanzapine-> resumed  -lithium level ordered 10/16/23 -> dose increased from 300 to 600 daily at discharge  -CEC discontinued  -Psychiatry's recs appreciated

## 2023-10-20 NOTE — ASSESSMENT & PLAN NOTE
Acute metabolic encephalopathy - RESOLVED  Hepatic encephalopathy - RESOLVED  History of recurrent UTIs with current admission for VRE - completed Zyvox  Chronic indwelling baugh catheter for urinary retention  -increased agitation and aggression, refusing home medications    -HDS and afebrile on admit  -admission labs:   -CBC with WBC 3, H/H 11.4/33.5, platelets 87 (all at or better than baseline). Chemistry with stable renal function, glucose 89, K 4.4, alk phose 157, TBili 3.9, AST 57, ALT 30 (all similar to baseline). Ammonia 52. TSH 0.115/FT4 1.12. PT/INR pending. Lithium level 0.1. Serum acetaminophen <3.0. Serum alcohol <10. UDS + benzos. UA concerning for infection.   -admission micro: VRE+, Zyvox PO started with ID consulted-> 3 day rx completed   -admission diagnostics:   -CT A/P with cirrhosis with signs of portal hypertension including splenomegaly and portosystemic collaterals; cholelithiasis; decreased 9.8 cm left retroperitoneal collection, which could represent a hematoma or abscess; large colonic stool burden; no bowel obstruction or inflammation.  -PEC'd in ED -> CEC'd 10/12. Psychiatry evaluation - latest recs reviewed. PEC discontinued 10/16.  -continue home lithium, zyprexa, and PRN alprazolam   -med and dose adjustments per Psych -> lithium dose increased to 600 mg at discharge  -continue home lactulose- reduced dose to 30g daily (goal 2-3 soft bms daily), rifaximin  -spironolactone and furosemide discontinued 10/16 due to low bp and hyponatremia -> BP and sodium levels improved, can resume at discharge  -delirium and fall precautions

## 2023-10-20 NOTE — ASSESSMENT & PLAN NOTE
"Patient has Abnormal Magnesium: hypomagnesemia. Will continue to monitor electrolytes closely. Will replace the affected electrolytes and repeat labs to be done after interventions completed. The patient's magnesium results have been reviewed and are listed below.  No results for input(s): "MG" in the last 24 hours.   10/12: mag sulfate 2g IV x1, then oral magnesium 400 qd     "
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
## 2023-11-14 NOTE — SUBJECTIVE & OBJECTIVE
Past Medical History:   Diagnosis Date    Addiction to drug     Alcohol abuse     Alcohol abuse, in remission 6/15/2015    14.5 weeks ago; AA weekly    Anemia     Anxiety 6/15/2015    Behavioral problem     Bipolar disorder     Bipolar disorder in remission 6/15/2015    Cirrhosis, Laennec's 6/15/2015    Depression     Encounter for blood transfusion     Epistaxis 6/15/2015    Fatigue     Glaucoma     Hematuria     Hepatic encephalopathy 6/15/2015    Hepatic enlargement     History of psychiatric care     History of psychiatric hospitalization     History of seizure 6/15/2015    1    hx of intentional Tylenol overdose 6/15/2015    2005- situational and hx of bipolar    Hx of psychiatric care     Macrocytic anemia 9/18/2015    6 units PRBC since June 2015    Jeana     Osteoarthritis     Other ascites 6/15/2015    Psychiatric exam requested by authority     Psychiatric problem     Psychosis 9/26/2019    Renal disorder     Seizures     Self-harming behavior     Suicide attempt     Therapy     Thrombocytopenia 6/15/2015       Past Surgical History:   Procedure Laterality Date    COSMETIC SURGERY      EMBOLIZATION N/A 6/11/2023    Procedure: EMBOLIZATION, BLOOD VESSEL;  Surgeon: Debbie Surgeon;  Location: [facility];  Service: Anesthesiology;  Laterality: N/A;    ESOPHAGOGASTRODUODENOSCOPY      ESOPHAGOGASTRODUODENOSCOPY N/A 7/6/2023    Procedure: EGD (ESOPHAGOGASTRODUODENOSCOPY);  Surgeon: Bernice Guillen MD;  Location: [address]);  Service: Endoscopy;  Laterality: N/A;    ESOPHAGOGASTRODUODENOSCOPY N/A 7/11/2023    Procedure: EGD (ESOPHAGOGASTRODUODENOSCOPY);  Surgeon: Rangel Broussard MD;  Location: [address]);  Service: Endoscopy;  Laterality: N/A;       Review of patient's allergies indicates:   Allergen Reactions    Sulfa (sulfonamide antibiotics) Rash    Codeine Nausea And Vomiting       No current facility-administered medications on file prior to encounter.     Current Outpatient Medications on  File Prior to Encounter   Medication Sig    ALPRAZolam (XANAX) 0.25 MG tablet Take 1 tablet (0.25 mg total) by mouth 2 (two) times daily as needed for Anxiety.    ferrous sulfate (FEOSOL) 325 mg (65 mg iron) Tab tablet Take 325 mg by mouth once daily.    furosemide (LASIX) 20 MG tablet Take 20 mg by mouth once daily.    lactulose (CHRONULAC) 20 gram/30 mL Soln Take 45 mLs (30 g total) by mouth 3 (three) times daily. TITRATE TO 3-4 BOWEL MOVEMENTS DAILY.    levetiracetam 500 mg/5 mL (5 mL) Soln Take 10 mLs (1,000 mg total) by mouth 2 (two) times daily.    lithium (LITHOBID) 300 MG CR tablet Take 1 tablet (300 mg total) by mouth every evening.    OLANZapine (ZYPREXA) 5 MG tablet Take 1 tablet (5 mg total) by mouth every evening.    pantoprazole (PROTONIX) 40 mg suspension Take 1 packet (40 mg total) by mouth 2 (two) times daily.    rifAXIMin (XIFAXAN) 550 mg Tab Take 1 tablet (550 mg total) by mouth 2 (two) times daily.    [DISCONTINUED] cefpodoxime (VANTIN) 100 MG tablet Take 2 tablets (200 mg total) by mouth every 12 (twelve) hours. Last dose: 9/16     Family History       Problem Relation (Age of Onset)    Alcohol abuse Father, Sister, Paternal Grandfather    Bipolar disorder Father    Hypertension Mother    Suicide Father          Tobacco Use    Smoking status: Never    Smokeless tobacco: Never   Substance and Sexual Activity    Alcohol use: Not Currently     Comment: hx of ETOH abuse with cirrhosis of liver    Drug use: No    Sexual activity: Not Currently     Review of Systems   Unable to perform ROS: Mental status change     Objective:     Vital Signs (Most Recent):  Temp: 97.8 °F (36.6 °C) (09/17/23 0915)  Pulse: 69 (09/17/23 1515)  Resp: 18 (09/17/23 1515)  BP: (!) 100/52 (09/17/23 1515)  SpO2: (!) 94 % (09/17/23 1515) Vital Signs (24h Range):  Temp:  [97.4 °F (36.3 °C)-98.4 °F (36.9 °C)] 97.8 °F (36.6 °C)  Pulse:  [55-91] 69  Resp:  [12-20] 18  SpO2:  [94 %-97 %] 94 %  BP: (100-148)/(52-71) 100/52  "    Weight: 57.4 kg (126 lb 8.7 oz)  Body mass index is 23.15 kg/m².     Physical Exam  Constitutional:       General: She is not in acute distress.     Appearance: She is ill-appearing and diaphoretic. She is not toxic-appearing.   HENT:      Head: Normocephalic and atraumatic.      Nose: Nose normal.   Eyes:      General: Scleral icterus present.      Extraocular Movements: Extraocular movements intact.      Pupils: Pupils are equal, round, and reactive to light.   Cardiovascular:      Rate and Rhythm: Regular rhythm. Tachycardia present.   Pulmonary:      Effort: Pulmonary effort is normal. No respiratory distress.      Breath sounds: Rales present. No wheezing.   Abdominal:      General: Abdomen is flat. There is distension.      Palpations: Abdomen is soft.      Tenderness: There is no abdominal tenderness. There is no guarding.   Musculoskeletal:         General: Normal range of motion.      Cervical back: Normal range of motion and neck supple. No rigidity.      Right lower leg: Edema present.      Left lower leg: Edema present.   Skin:     General: Skin is warm.      Coloration: Skin is not jaundiced or pale.   Neurological:      General: No focal deficit present.      Mental Status: She is alert. She is disoriented.              CRANIAL NERVES     CN III, IV, VI   Pupils are equal, round, and reactive to light.       Significant Labs: All pertinent labs within the past 24 hours have been reviewed.  CBC:   Recent Labs   Lab 09/17/23  0254   WBC 3.29*   HGB 7.2*   HCT 23.2*   PLT 52*       CMP:   Recent Labs   Lab 09/17/23  0254 09/17/23  1344    138   K 3.0* 3.1*   * 113*   CO2 19* 17*   * 91   BUN 6 6   CREATININE 0.5 0.5   CALCIUM 7.3* 7.6*   PROT 4.2*  --    ALBUMIN 1.4*  --    BILITOT 2.4*  --    ALKPHOS 131  --    AST 36  --    ALT 17  --    ANIONGAP 6* 8       Cardiac Markers:   No results for input(s): "CKMB", "MYOGLOBIN", "BNP", "TROPISTAT" in the last 48 hours.    Lactic Acid: "   Recent Labs   Lab 09/17/23  0254   LACTATE 1.3       Urine Studies:   Recent Labs   Lab 09/17/23  0007   COLORU Yellow   APPEARANCEUA Clear   PHUR 7.0   SPECGRAV 1.015   PROTEINUA Negative   GLUCUA Negative   KETONESU Negative   BILIRUBINUA Negative   OCCULTUA Negative   NITRITE Negative   LEUKOCYTESUR 3+*   RBCUA 76*   WBCUA 18*   BACTERIA Many*         Significant Imaging: I have reviewed all pertinent imaging results/findings within the past 24 hours.   Calm

## 2023-11-20 PROBLEM — R65.20 SEVERE SEPSIS: Status: RESOLVED | Noted: 2023-08-16 | Resolved: 2023-11-20

## 2023-11-20 PROBLEM — J18.9 PNEUMONIA: Status: RESOLVED | Noted: 2023-08-14 | Resolved: 2023-11-20

## 2023-11-20 PROBLEM — N39.0 UTI (URINARY TRACT INFECTION): Status: RESOLVED | Noted: 2021-11-03 | Resolved: 2023-11-20

## 2023-11-20 PROBLEM — K26.4 GASTROINTESTINAL HEMORRHAGE ASSOCIATED WITH DUODENAL ULCER: Status: RESOLVED | Noted: 2023-07-07 | Resolved: 2023-11-20

## 2023-11-20 PROBLEM — A41.9 SEVERE SEPSIS: Status: RESOLVED | Noted: 2023-08-16 | Resolved: 2023-11-20

## 2023-11-29 ENCOUNTER — HOSPITAL ENCOUNTER (INPATIENT)
Facility: HOSPITAL | Age: 58
LOS: 9 days | DRG: 689 | End: 2023-12-08
Attending: STUDENT IN AN ORGANIZED HEALTH CARE EDUCATION/TRAINING PROGRAM | Admitting: STUDENT IN AN ORGANIZED HEALTH CARE EDUCATION/TRAINING PROGRAM
Payer: MEDICARE

## 2023-11-29 DIAGNOSIS — E21.3 HYPERPARATHYROIDISM: Primary | ICD-10-CM
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
## 2023-11-29 DIAGNOSIS — R07.9 CHEST PAIN: ICD-10-CM

## 2023-11-29 PROBLEM — N30.00 ACUTE CYSTITIS WITHOUT HEMATURIA: Status: ACTIVE | Noted: 2023-11-29

## 2023-11-29 LAB
25(OH)D3+25(OH)D2 SERPL-MCNC: 11 NG/ML (ref 30–96)
ALBUMIN SERPL BCP-MCNC: 2.4 G/DL (ref 3.5–5.2)
ALBUMIN SERPL BCP-MCNC: 2.5 G/DL (ref 3.5–5.2)
ALBUMIN SERPL BCP-MCNC: 2.9 G/DL (ref 3.5–5.2)
ALP SERPL-CCNC: 232 U/L (ref 55–135)
ALT SERPL W/O P-5'-P-CCNC: 109 U/L (ref 10–44)
AMMONIA PLAS-SCNC: 51 UMOL/L (ref 10–50)
ANION GAP SERPL CALC-SCNC: 4 MMOL/L (ref 8–16)
ANION GAP SERPL CALC-SCNC: 5 MMOL/L (ref 8–16)
ANION GAP SERPL CALC-SCNC: 8 MMOL/L (ref 8–16)
AST SERPL-CCNC: 169 U/L (ref 10–40)
BACTERIA #/AREA URNS AUTO: ABNORMAL /HPF
BASOPHILS # BLD AUTO: 0.03 K/UL (ref 0–0.2)
BASOPHILS NFR BLD: 0.3 % (ref 0–1.9)
BILIRUB SERPL-MCNC: 4.8 MG/DL (ref 0.1–1)
BILIRUB UR QL STRIP: NEGATIVE
BNP SERPL-MCNC: 17 PG/ML (ref 0–99)
BUN SERPL-MCNC: 27 MG/DL (ref 6–20)
BUN SERPL-MCNC: 28 MG/DL (ref 6–20)
BUN SERPL-MCNC: 37 MG/DL (ref 6–20)
CALCIUM SERPL-MCNC: 12.1 MG/DL (ref 8.7–10.5)
CALCIUM SERPL-MCNC: 12.1 MG/DL (ref 8.7–10.5)
CALCIUM SERPL-MCNC: 14.2 MG/DL (ref 8.7–10.5)
CHLORIDE SERPL-SCNC: 116 MMOL/L (ref 95–110)
CHLORIDE SERPL-SCNC: 117 MMOL/L (ref 95–110)
CHLORIDE SERPL-SCNC: 117 MMOL/L (ref 95–110)
CK SERPL-CCNC: 8 U/L (ref 20–180)
CLARITY UR REFRACT.AUTO: ABNORMAL
CO2 SERPL-SCNC: 21 MMOL/L (ref 23–29)
CO2 SERPL-SCNC: 23 MMOL/L (ref 23–29)
CO2 SERPL-SCNC: 25 MMOL/L (ref 23–29)
COLOR UR AUTO: YELLOW
CREAT SERPL-MCNC: 0.8 MG/DL (ref 0.5–1.4)
CREAT SERPL-MCNC: 0.8 MG/DL (ref 0.5–1.4)
CREAT SERPL-MCNC: 1.4 MG/DL (ref 0.5–1.4)
DIFFERENTIAL METHOD: ABNORMAL
EOSINOPHIL # BLD AUTO: 0.5 K/UL (ref 0–0.5)
EOSINOPHIL NFR BLD: 5.9 % (ref 0–8)
ERYTHROCYTE [DISTWIDTH] IN BLOOD BY AUTOMATED COUNT: 15 % (ref 11.5–14.5)
EST. GFR  (NO RACE VARIABLE): 43.9 ML/MIN/1.73 M^2
EST. GFR  (NO RACE VARIABLE): >60 ML/MIN/1.73 M^2
EST. GFR  (NO RACE VARIABLE): >60 ML/MIN/1.73 M^2
GLUCOSE SERPL-MCNC: 96 MG/DL (ref 70–110)
GLUCOSE SERPL-MCNC: 98 MG/DL (ref 70–110)
GLUCOSE SERPL-MCNC: 99 MG/DL (ref 70–110)
GLUCOSE UR QL STRIP: NEGATIVE
HCT VFR BLD AUTO: 44.2 % (ref 37–48.5)
HGB BLD-MCNC: 14.5 G/DL (ref 12–16)
HGB UR QL STRIP: NEGATIVE
IMM GRANULOCYTES # BLD AUTO: 0.02 K/UL (ref 0–0.04)
IMM GRANULOCYTES NFR BLD AUTO: 0.2 % (ref 0–0.5)
INR PPP: 1.9 (ref 0.8–1.2)
KETONES UR QL STRIP: NEGATIVE
LACTATE SERPL-SCNC: 2.3 MMOL/L (ref 0.5–2.2)
LEUKOCYTE ESTERASE UR QL STRIP: ABNORMAL
LIPASE SERPL-CCNC: 69 U/L (ref 4–60)
LITHIUM SERPL-SCNC: 1.6 MMOL/L (ref 0.6–1.2)
LYMPHOCYTES # BLD AUTO: 1.4 K/UL (ref 1–4.8)
LYMPHOCYTES NFR BLD: 15.5 % (ref 18–48)
MAGNESIUM SERPL-MCNC: 2.4 MG/DL (ref 1.6–2.6)
MCH RBC QN AUTO: 36.6 PG (ref 27–31)
MCHC RBC AUTO-ENTMCNC: 32.8 G/DL (ref 32–36)
MCV RBC AUTO: 112 FL (ref 82–98)
MICROSCOPIC COMMENT: ABNORMAL
MONOCYTES # BLD AUTO: 0.9 K/UL (ref 0.3–1)
MONOCYTES NFR BLD: 10.1 % (ref 4–15)
NEUTROPHILS # BLD AUTO: 6.3 K/UL (ref 1.8–7.7)
NEUTROPHILS NFR BLD: 68 % (ref 38–73)
NITRITE UR QL STRIP: NEGATIVE
NRBC BLD-RTO: 0 /100 WBC
PH UR STRIP: 7 [PH] (ref 5–8)
PHOSPHATE SERPL-MCNC: 2.5 MG/DL (ref 2.7–4.5)
PHOSPHATE SERPL-MCNC: 2.6 MG/DL (ref 2.7–4.5)
PLATELET # BLD AUTO: 106 K/UL (ref 150–450)
PMV BLD AUTO: 9.5 FL (ref 9.2–12.9)
POCT GLUCOSE: 125 MG/DL (ref 70–110)
POTASSIUM SERPL-SCNC: 3.7 MMOL/L (ref 3.5–5.1)
POTASSIUM SERPL-SCNC: 4.2 MMOL/L (ref 3.5–5.1)
POTASSIUM SERPL-SCNC: 4.9 MMOL/L (ref 3.5–5.1)
PROT SERPL-MCNC: 7.2 G/DL (ref 6–8.4)
PROT UR QL STRIP: NEGATIVE
PROTHROMBIN TIME: 19.8 SEC (ref 9–12.5)
PTH-INTACT SERPL-MCNC: 115.1 PG/ML (ref 9–77)
RBC # BLD AUTO: 3.96 M/UL (ref 4–5.4)
RBC #/AREA URNS AUTO: 2 /HPF (ref 0–4)
SODIUM SERPL-SCNC: 145 MMOL/L (ref 136–145)
SODIUM SERPL-SCNC: 145 MMOL/L (ref 136–145)
SODIUM SERPL-SCNC: 146 MMOL/L (ref 136–145)
SP GR UR STRIP: 1.01 (ref 1–1.03)
SQUAMOUS #/AREA URNS AUTO: 3 /HPF
TROPONIN I SERPL DL<=0.01 NG/ML-MCNC: 0.01 NG/ML (ref 0–0.03)
TSH SERPL DL<=0.005 MIU/L-ACNC: 0.53 UIU/ML (ref 0.4–4)
URN SPEC COLLECT METH UR: ABNORMAL
WBC # BLD AUTO: 9.19 K/UL (ref 3.9–12.7)
WBC #/AREA URNS AUTO: 67 /HPF (ref 0–5)

## 2023-11-29 PROCEDURE — 82140 ASSAY OF AMMONIA: CPT | Performed by: STUDENT IN AN ORGANIZED HEALTH CARE EDUCATION/TRAINING PROGRAM

## 2023-11-29 PROCEDURE — 25000003 PHARM REV CODE 250: Performed by: PHYSICIAN ASSISTANT

## 2023-11-29 PROCEDURE — 82306 VITAMIN D 25 HYDROXY: CPT | Performed by: STUDENT IN AN ORGANIZED HEALTH CARE EDUCATION/TRAINING PROGRAM

## 2023-11-29 PROCEDURE — 83880 ASSAY OF NATRIURETIC PEPTIDE: CPT | Performed by: STUDENT IN AN ORGANIZED HEALTH CARE EDUCATION/TRAINING PROGRAM

## 2023-11-29 PROCEDURE — 96361 HYDRATE IV INFUSION ADD-ON: CPT

## 2023-11-29 PROCEDURE — 63600175 PHARM REV CODE 636 W HCPCS: Mod: JG | Performed by: PHYSICIAN ASSISTANT

## 2023-11-29 PROCEDURE — 25000003 PHARM REV CODE 250: Performed by: STUDENT IN AN ORGANIZED HEALTH CARE EDUCATION/TRAINING PROGRAM

## 2023-11-29 PROCEDURE — 87040 BLOOD CULTURE FOR BACTERIA: CPT | Performed by: STUDENT IN AN ORGANIZED HEALTH CARE EDUCATION/TRAINING PROGRAM

## 2023-11-29 PROCEDURE — 83605 ASSAY OF LACTIC ACID: CPT | Performed by: STUDENT IN AN ORGANIZED HEALTH CARE EDUCATION/TRAINING PROGRAM

## 2023-11-29 PROCEDURE — 84484 ASSAY OF TROPONIN QUANT: CPT | Performed by: STUDENT IN AN ORGANIZED HEALTH CARE EDUCATION/TRAINING PROGRAM

## 2023-11-29 PROCEDURE — 63600175 PHARM REV CODE 636 W HCPCS: Performed by: STUDENT IN AN ORGANIZED HEALTH CARE EDUCATION/TRAINING PROGRAM

## 2023-11-29 PROCEDURE — 93010 ELECTROCARDIOGRAM REPORT: CPT | Mod: ,,, | Performed by: INTERNAL MEDICINE

## 2023-11-29 PROCEDURE — 87086 URINE CULTURE/COLONY COUNT: CPT | Performed by: STUDENT IN AN ORGANIZED HEALTH CARE EDUCATION/TRAINING PROGRAM

## 2023-11-29 PROCEDURE — 93010 EKG 12-LEAD: ICD-10-PCS | Mod: ,,, | Performed by: INTERNAL MEDICINE

## 2023-11-29 PROCEDURE — 82550 ASSAY OF CK (CPK): CPT | Performed by: STUDENT IN AN ORGANIZED HEALTH CARE EDUCATION/TRAINING PROGRAM

## 2023-11-29 PROCEDURE — 80069 RENAL FUNCTION PANEL: CPT | Performed by: STUDENT IN AN ORGANIZED HEALTH CARE EDUCATION/TRAINING PROGRAM

## 2023-11-29 PROCEDURE — 51798 US URINE CAPACITY MEASURE: CPT

## 2023-11-29 PROCEDURE — 93005 ELECTROCARDIOGRAM TRACING: CPT

## 2023-11-29 PROCEDURE — 11000001 HC ACUTE MED/SURG PRIVATE ROOM

## 2023-11-29 PROCEDURE — 83735 ASSAY OF MAGNESIUM: CPT | Performed by: STUDENT IN AN ORGANIZED HEALTH CARE EDUCATION/TRAINING PROGRAM

## 2023-11-29 PROCEDURE — 83690 ASSAY OF LIPASE: CPT | Performed by: STUDENT IN AN ORGANIZED HEALTH CARE EDUCATION/TRAINING PROGRAM

## 2023-11-29 PROCEDURE — 85610 PROTHROMBIN TIME: CPT | Performed by: PHYSICIAN ASSISTANT

## 2023-11-29 PROCEDURE — 81001 URINALYSIS AUTO W/SCOPE: CPT | Performed by: STUDENT IN AN ORGANIZED HEALTH CARE EDUCATION/TRAINING PROGRAM

## 2023-11-29 PROCEDURE — 85025 COMPLETE CBC W/AUTO DIFF WBC: CPT | Performed by: STUDENT IN AN ORGANIZED HEALTH CARE EDUCATION/TRAINING PROGRAM

## 2023-11-29 PROCEDURE — 80053 COMPREHEN METABOLIC PANEL: CPT | Performed by: STUDENT IN AN ORGANIZED HEALTH CARE EDUCATION/TRAINING PROGRAM

## 2023-11-29 PROCEDURE — 36415 COLL VENOUS BLD VENIPUNCTURE: CPT | Performed by: STUDENT IN AN ORGANIZED HEALTH CARE EDUCATION/TRAINING PROGRAM

## 2023-11-29 PROCEDURE — 99285 EMERGENCY DEPT VISIT HI MDM: CPT | Mod: 25

## 2023-11-29 PROCEDURE — 83970 ASSAY OF PARATHORMONE: CPT | Performed by: STUDENT IN AN ORGANIZED HEALTH CARE EDUCATION/TRAINING PROGRAM

## 2023-11-29 PROCEDURE — 80178 ASSAY OF LITHIUM: CPT | Performed by: PHYSICIAN ASSISTANT

## 2023-11-29 PROCEDURE — 96365 THER/PROPH/DIAG IV INF INIT: CPT

## 2023-11-29 PROCEDURE — 99222 1ST HOSP IP/OBS MODERATE 55: CPT | Mod: GC,,, | Performed by: INTERNAL MEDICINE

## 2023-11-29 PROCEDURE — 84443 ASSAY THYROID STIM HORMONE: CPT | Performed by: STUDENT IN AN ORGANIZED HEALTH CARE EDUCATION/TRAINING PROGRAM

## 2023-11-29 PROCEDURE — 99222 PR INITIAL HOSPITAL CARE,LEVL II: ICD-10-PCS | Mod: GC,,, | Performed by: INTERNAL MEDICINE

## 2023-11-29 RX ORDER — POLYETHYLENE GLYCOL 3350 17 G/17G
17 POWDER, FOR SOLUTION ORAL DAILY PRN
Status: DISCONTINUED | OUTPATIENT
Start: 2023-11-29 | End: 2023-12-08 | Stop reason: HOSPADM

## 2023-11-29 RX ORDER — LITHIUM CARBONATE 300 MG/1
600 TABLET, FILM COATED, EXTENDED RELEASE ORAL NIGHTLY
Status: DISCONTINUED | OUTPATIENT
Start: 2023-11-29 | End: 2023-12-02

## 2023-11-29 RX ORDER — CALCITONIN SALMON 200 [USP'U]/ML
4 INJECTION, SOLUTION INTRAMUSCULAR; SUBCUTANEOUS 2 TIMES DAILY
Status: DISCONTINUED | OUTPATIENT
Start: 2023-11-29 | End: 2023-11-29

## 2023-11-29 RX ORDER — SODIUM CHLORIDE 9 MG/ML
INJECTION, SOLUTION INTRAVENOUS CONTINUOUS
Status: DISCONTINUED | OUTPATIENT
Start: 2023-11-29 | End: 2023-12-01

## 2023-11-29 RX ORDER — CHOLECALCIFEROL (VITAMIN D3) 25 MCG
2000 TABLET ORAL DAILY
Status: DISCONTINUED | OUTPATIENT
Start: 2023-11-29 | End: 2023-12-08 | Stop reason: HOSPADM

## 2023-11-29 RX ORDER — PANTOPRAZOLE SODIUM 40 MG/1
40 FOR SUSPENSION ORAL 2 TIMES DAILY
Status: DISCONTINUED | OUTPATIENT
Start: 2023-11-29 | End: 2023-12-08 | Stop reason: HOSPADM

## 2023-11-29 RX ORDER — SIMETHICONE 80 MG
1 TABLET,CHEWABLE ORAL 4 TIMES DAILY PRN
Status: DISCONTINUED | OUTPATIENT
Start: 2023-11-29 | End: 2023-12-08 | Stop reason: HOSPADM

## 2023-11-29 RX ORDER — ACETAMINOPHEN 325 MG/1
650 TABLET ORAL EVERY 4 HOURS PRN
Status: DISCONTINUED | OUTPATIENT
Start: 2023-11-29 | End: 2023-12-08 | Stop reason: HOSPADM

## 2023-11-29 RX ORDER — LANOLIN ALCOHOL/MO/W.PET/CERES
1 CREAM (GRAM) TOPICAL DAILY
Status: DISCONTINUED | OUTPATIENT
Start: 2023-11-29 | End: 2023-12-08 | Stop reason: HOSPADM

## 2023-11-29 RX ORDER — ENOXAPARIN SODIUM 100 MG/ML
30 INJECTION SUBCUTANEOUS EVERY 24 HOURS
Status: DISCONTINUED | OUTPATIENT
Start: 2023-11-29 | End: 2023-11-29

## 2023-11-29 RX ORDER — CALCITONIN SALMON 200 [USP'U]/ML
4 INJECTION, SOLUTION INTRAMUSCULAR; SUBCUTANEOUS 2 TIMES DAILY
Status: COMPLETED | OUTPATIENT
Start: 2023-11-29 | End: 2023-11-30

## 2023-11-29 RX ORDER — BISACODYL 10 MG
10 SUPPOSITORY, RECTAL RECTAL DAILY PRN
Status: DISCONTINUED | OUTPATIENT
Start: 2023-11-29 | End: 2023-12-08 | Stop reason: HOSPADM

## 2023-11-29 RX ORDER — MAG HYDROX/ALUMINUM HYD/SIMETH 200-200-20
30 SUSPENSION, ORAL (FINAL DOSE FORM) ORAL 4 TIMES DAILY PRN
Status: DISCONTINUED | OUTPATIENT
Start: 2023-11-29 | End: 2023-12-08 | Stop reason: HOSPADM

## 2023-11-29 RX ORDER — SODIUM CHLORIDE 0.9 % (FLUSH) 0.9 %
5 SYRINGE (ML) INJECTION
Status: DISCONTINUED | OUTPATIENT
Start: 2023-11-29 | End: 2023-12-08 | Stop reason: HOSPADM

## 2023-11-29 RX ORDER — LACTULOSE 10 G/15ML
30 SOLUTION ORAL 3 TIMES DAILY
Status: DISCONTINUED | OUTPATIENT
Start: 2023-11-29 | End: 2023-12-08 | Stop reason: HOSPADM

## 2023-11-29 RX ORDER — FUROSEMIDE 20 MG/1
20 TABLET ORAL DAILY
Status: DISCONTINUED | OUTPATIENT
Start: 2023-11-29 | End: 2023-12-01

## 2023-11-29 RX ORDER — OLANZAPINE 2.5 MG/1
5 TABLET ORAL NIGHTLY
Status: DISCONTINUED | OUTPATIENT
Start: 2023-11-29 | End: 2023-12-08 | Stop reason: HOSPADM

## 2023-11-29 RX ORDER — SPIRONOLACTONE 25 MG/1
100 TABLET ORAL DAILY
Status: DISCONTINUED | OUTPATIENT
Start: 2023-11-29 | End: 2023-12-01

## 2023-11-29 RX ORDER — TALC
6 POWDER (GRAM) TOPICAL NIGHTLY PRN
Status: DISCONTINUED | OUTPATIENT
Start: 2023-11-29 | End: 2023-12-08 | Stop reason: HOSPADM

## 2023-11-29 RX ORDER — LEVETIRACETAM 100 MG/ML
1000 SOLUTION ORAL 2 TIMES DAILY
Status: DISCONTINUED | OUTPATIENT
Start: 2023-11-29 | End: 2023-12-08 | Stop reason: HOSPADM

## 2023-11-29 RX ORDER — ONDANSETRON 8 MG/1
8 TABLET, ORALLY DISINTEGRATING ORAL EVERY 8 HOURS PRN
Status: DISCONTINUED | OUTPATIENT
Start: 2023-11-29 | End: 2023-12-08 | Stop reason: HOSPADM

## 2023-11-29 RX ADMIN — FERROUS SULFATE TAB EC 325 MG (65 MG FE EQUIVALENT) 1 EACH: 325 (65 FE) TABLET DELAYED RESPONSE at 08:11

## 2023-11-29 RX ADMIN — SPIRONOLACTONE 100 MG: 25 TABLET ORAL at 09:11

## 2023-11-29 RX ADMIN — SODIUM CHLORIDE: 9 INJECTION, SOLUTION INTRAVENOUS at 04:11

## 2023-11-29 RX ADMIN — SODIUM CHLORIDE, POTASSIUM CHLORIDE, SODIUM LACTATE AND CALCIUM CHLORIDE 1000 ML: 600; 310; 30; 20 INJECTION, SOLUTION INTRAVENOUS at 05:11

## 2023-11-29 RX ADMIN — CEFTRIAXONE SODIUM 1 G: 1 INJECTION, POWDER, FOR SOLUTION INTRAMUSCULAR; INTRAVENOUS at 04:11

## 2023-11-29 RX ADMIN — SODIUM CHLORIDE: 9 INJECTION, SOLUTION INTRAVENOUS at 10:11

## 2023-11-29 RX ADMIN — RIFAXIMIN 550 MG: 550 TABLET ORAL at 08:11

## 2023-11-29 RX ADMIN — CALCITONIN SALMON 162 UNITS: 200 INJECTION, SOLUTION INTRAMUSCULAR; SUBCUTANEOUS at 07:11

## 2023-11-29 RX ADMIN — RIFAXIMIN 550 MG: 550 TABLET ORAL at 10:11

## 2023-11-29 RX ADMIN — PANTOPRAZOLE SODIUM 40 MG: 40 GRANULE, DELAYED RELEASE ORAL at 10:11

## 2023-11-29 RX ADMIN — LACTULOSE 30 G: 20 SOLUTION ORAL at 03:11

## 2023-11-29 RX ADMIN — LITHIUM CARBONATE 600 MG: 300 TABLET, FILM COATED, EXTENDED RELEASE ORAL at 10:11

## 2023-11-29 RX ADMIN — LEVETIRACETAM 1000 MG: 500 SOLUTION ORAL at 08:11

## 2023-11-29 RX ADMIN — OLANZAPINE 5 MG: 2.5 TABLET, FILM COATED ORAL at 10:11

## 2023-11-29 RX ADMIN — FUROSEMIDE 20 MG: 20 TABLET ORAL at 08:11

## 2023-11-29 RX ADMIN — CALCITONIN SALMON 162 UNITS: 200 INJECTION, SOLUTION INTRAMUSCULAR; SUBCUTANEOUS at 11:11

## 2023-11-29 RX ADMIN — LEVETIRACETAM 1000 MG: 500 SOLUTION ORAL at 10:11

## 2023-11-29 RX ADMIN — CHOLECALCIFEROL TAB 25 MCG (1000 UNIT) 2000 UNITS: 25 TAB at 04:11

## 2023-11-29 RX ADMIN — PANTOPRAZOLE SODIUM 40 MG: 40 GRANULE, DELAYED RELEASE ORAL at 08:11

## 2023-11-29 RX ADMIN — SODIUM CHLORIDE, POTASSIUM CHLORIDE, SODIUM LACTATE AND CALCIUM CHLORIDE 1000 ML: 600; 310; 30; 20 INJECTION, SOLUTION INTRAVENOUS at 04:11

## 2023-11-29 RX ADMIN — LACTULOSE 30 G: 20 SOLUTION ORAL at 10:11

## 2023-11-29 RX ADMIN — LACTULOSE 30 G: 20 SOLUTION ORAL at 08:11

## 2023-11-29 NOTE — ASSESSMENT & PLAN NOTE
Acute cystitis    Patient presents from NH with acute mentation change in the setting of hypercalcemia and UTI.  - endocrinology consulted  - continue ceftriaxone  - follow cultures

## 2023-11-29 NOTE — SUBJECTIVE & OBJECTIVE
Past Medical History:   Diagnosis Date    Addiction to drug     Alcohol abuse     Alcohol abuse, in remission 6/15/2015    14.5 weeks ago; AA weekly    Anemia     Anxiety 6/15/2015    Behavioral problem     Bipolar disorder     Bipolar disorder in remission 6/15/2015    Cirrhosis, Laennec's 6/15/2015    Depression     Encounter for blood transfusion     Epistaxis 6/15/2015    Fatigue     Glaucoma     Hematuria     Hepatic encephalopathy 6/15/2015    Hepatic enlargement     History of psychiatric care     History of psychiatric hospitalization     History of seizure 6/15/2015    1    hx of intentional Tylenol overdose 6/15/2015    2005- situational and hx of bipolar    Hx of psychiatric care     Macrocytic anemia 9/18/2015    6 units PRBC since June 2015    Jeana     Osteoarthritis     Other ascites 6/15/2015    Psychiatric exam requested by authority     Psychiatric problem     Psychosis 9/26/2019    Renal disorder     Seizures     Self-harming behavior     Suicide attempt     Therapy     Thrombocytopenia 6/15/2015       Past Surgical History:   Procedure Laterality Date    COSMETIC SURGERY      EMBOLIZATION N/A 6/11/2023    Procedure: EMBOLIZATION, BLOOD VESSEL;  Surgeon: Debbie Surgeon;  Location: [facility];  Service: Anesthesiology;  Laterality: N/A;    ESOPHAGOGASTRODUODENOSCOPY      ESOPHAGOGASTRODUODENOSCOPY N/A 7/6/2023    Procedure: EGD (ESOPHAGOGASTRODUODENOSCOPY);  Surgeon: Bernice Guillen MD;  Location: [address]);  Service: Endoscopy;  Laterality: N/A;    ESOPHAGOGASTRODUODENOSCOPY N/A 7/11/2023    Procedure: EGD (ESOPHAGOGASTRODUODENOSCOPY);  Surgeon: Rangel Broussard MD;  Location: [address]);  Service: Endoscopy;  Laterality: N/A;       Review of patient's allergies indicates:   Allergen Reactions    Sulfa (sulfonamide antibiotics) Rash    Codeine Nausea And Vomiting       No current facility-administered medications on file prior to encounter.     Current Outpatient Medications on  File Prior to Encounter   Medication Sig    ALPRAZolam (XANAX) 0.25 MG tablet Take 1 tablet (0.25 mg total) by mouth 2 (two) times daily as needed for Anxiety.    brimonidine-timoloL (COMBIGAN) 0.2-0.5 % Drop Place 1 drop into both eyes 2 (two) times a day.    ferrous sulfate (FEOSOL) 325 mg (65 mg iron) Tab tablet Take 325 mg by mouth once daily.    furosemide (LASIX) 20 MG tablet Take 20 mg by mouth once daily.    lactulose (CHRONULAC) 20 gram/30 mL Soln Take 45 mLs (30 g total) by mouth 3 (three) times daily. TITRATE TO 3-4 BOWEL MOVEMENTS DAILY.    levetiracetam 500 mg/5 mL (5 mL) Soln Take 10 mLs (1,000 mg total) by mouth 2 (two) times daily.    lithium (LITHOBID) 300 MG CR tablet Take 2 tablets (600 mg total) by mouth every evening.    miconazole nitrate 2% (MICOTIN) 2 % Oint Apply topically 2 (two) times daily. (Patient taking differently: Apply topically 2 (two) times daily. To face as needed)    OLANZapine (ZYPREXA) 5 MG tablet Take 1 tablet (5 mg total) by mouth every evening.    pantoprazole (PROTONIX) 40 mg suspension Take 1 packet (40 mg total) by mouth 2 (two) times daily.    rifAXIMin (XIFAXAN) 550 mg Tab Take 1 tablet (550 mg total) by mouth 2 (two) times daily.    spironolactone (ALDACTONE) 100 MG tablet Take 1 tablet (100 mg total) by mouth once daily.     Family History       Problem Relation (Age of Onset)    Alcohol abuse Father, Sister, Paternal Grandfather    Bipolar disorder Father    Hypertension Mother    Suicide Father          Tobacco Use    Smoking status: Never    Smokeless tobacco: Never   Substance and Sexual Activity    Alcohol use: Not Currently     Comment: hx of ETOH abuse with cirrhosis of liver    Drug use: No    Sexual activity: Not Currently     Review of Systems   Constitutional:  Positive for activity change and chills. Negative for fever.   Respiratory:  Negative for cough, chest tightness and shortness of breath.    Cardiovascular:  Negative for chest pain, palpitations  and leg swelling.   Gastrointestinal:  Negative for abdominal pain, constipation, diarrhea, nausea and vomiting.   Genitourinary:  Positive for frequency. Negative for dysuria and hematuria.   Musculoskeletal:  Positive for arthralgias. Negative for back pain, gait problem and neck pain.   Skin:  Negative for rash and wound.   Neurological:  Positive for syncope and light-headedness. Negative for dizziness.   Psychiatric/Behavioral:  Negative for agitation and confusion. The patient is not nervous/anxious.      Objective:     Vital Signs (Most Recent):  Temp: 98.7 °F (37.1 °C) (11/29/23 0700)  Pulse: 76 (11/29/23 0900)  Resp: 14 (11/29/23 0900)  BP: 118/61 (11/29/23 0900)  SpO2: 100 % (11/29/23 0900) Vital Signs (24h Range):  Temp:  [97.2 °F (36.2 °C)-98.7 °F (37.1 °C)] 98.7 °F (37.1 °C)  Pulse:  [74-92] 76  Resp:  [11-24] 14  SpO2:  [91 %-100 %] 100 %  BP: (108-131)/(58-70) 118/61     Weight: 40.5 kg (89 lb 4.6 oz)  Body mass index is 16.33 kg/m².     Physical Exam  Vitals and nursing note reviewed.   Constitutional:       General: She is sleeping. She is not in acute distress.     Appearance: She is cachectic.   HENT:      Head: Normocephalic and atraumatic.      Mouth/Throat:      Mouth: Mucous membranes are dry.      Pharynx: No oropharyngeal exudate.   Eyes:      Conjunctiva/sclera: Conjunctivae normal.      Pupils: Pupils are equal, round, and reactive to light.   Cardiovascular:      Rate and Rhythm: Normal rate and regular rhythm.      Heart sounds: Normal heart sounds.   Pulmonary:      Effort: Pulmonary effort is normal. No respiratory distress.      Breath sounds: Normal breath sounds. No wheezing.   Abdominal:      General: Bowel sounds are normal. There is no distension.      Palpations: Abdomen is soft.      Tenderness: There is no abdominal tenderness.   Musculoskeletal:         General: No tenderness. Normal range of motion.      Cervical back: Normal range of motion and neck supple.    Lymphadenopathy:      Cervical: No cervical adenopathy.   Skin:     General: Skin is warm and dry.      Capillary Refill: Capillary refill takes less than 2 seconds.      Findings: Ecchymosis present. No rash.   Neurological:      Mental Status: She is oriented to person, place, and time.      Cranial Nerves: No cranial nerve deficit.      Sensory: No sensory deficit.      Motor: Weakness (bilateral lower extremities) present.      Coordination: Coordination normal.      Comments: Poor participation   Psychiatric:         Behavior: Behavior normal.         Thought Content: Thought content normal.         Judgment: Judgment normal.              CRANIAL NERVES     CN III, IV, VI   Pupils are equal, round, and reactive to light.       Significant Labs: All pertinent labs within the past 24 hours have been reviewed.    Significant Imaging: I have reviewed all pertinent imaging results/findings within the past 24 hours.

## 2023-11-29 NOTE — ASSESSMENT & PLAN NOTE
Nutrition consulted. Most recent weight and BMI monitored-     Measurements:  Wt Readings from Last 1 Encounters:   11/29/23 40.5 kg (89 lb 4.6 oz)   Body mass index is 16.33 kg/m².  - dietary consulted, history       Interventions/Recommendations (treatment strategy):   - boost TID

## 2023-11-29 NOTE — ASSESSMENT & PLAN NOTE
Patient with known Cirrhosis. Co-morbidities are present and inclusive of hepatic encephalopathy and malnutrition.  MELD-Na score calculated; MELD 3.0: 25 at 11/29/2023  8:21 AM  MELD-Na: 23 at 11/29/2023  8:21 AM  Calculated from:  Serum Creatinine: 1.4 mg/dL at 11/29/2023  3:29 AM  Serum Sodium: 146 mmol/L (Using max of 137 mmol/L) at 11/29/2023  3:29 AM  Total Bilirubin: 4.8 mg/dL at 11/29/2023  3:29 AM  Serum Albumin: 2.9 g/dL at 11/29/2023  3:29 AM  INR(ratio): 1.9 at 11/29/2023  8:21 AM  Age at listing (hypothetical): 58 years  Sex: Female at 11/29/2023  8:21 AM      Continue chronic meds. Will avoid any hepatotoxic meds, and monitor CBC/CMP/INR for synthetic function.

## 2023-11-29 NOTE — CONSULTS
[facility] Dept  Endocrinology  Consult Note    Consult Requested by: Sarbjit Tavarez MD   Reason for admit: Acute metabolic encephalopathy    HISTORY OF PRESENT ILLNESS:  Marely Hamilton is a 58-year-old female with a reported medical history of EtOH cirrhosis, bipolar disorder, anemia, thrombocytopenia, cholelithiasis, DVT with IVC filter in place, GI bleed, ESBL UTI history and seizure disorder.  She was sent from her nursing facility after a fall with altered mentation, cough and urinary frequency.  Difficult historian and she is alert but not oriented on initial assessment.  Laboratory workup included elevated calcium levels and PTH also elevated.  Known history of bipolar disorder with use of Lithium.  Has had prior labs concerning for a pattern of primary hyperparathyroidism in the past.  Never evaluated by endocrinology.  She denies history of fractures or kidney stones.  Also denies ever being told of elevated calcium levels or abnormal parathyroid function in the past.      Regarding Hypercalcemia and/or Primary Hyperparathyroidism:    - Notable for albumin corrected calcium elevations on labs intermittently since 2015  - PTH has been inappropriately normal or mildly elevated since at least 2020    She had similar labs on admission in May 2023 with calcium acutely elevated and at that time Reclast and calcitonin were given with improvement in serum calcium and actually a slightly low serum calcium when corrected for albumin in the days/weeks following that.  Thoughts were that dehydration and supra-therapeutic lithium levels were the cause.  She was hydrated on that admission and plans were to remove lithium and pursue other therapies with psychiatry.       Lab Results   Component Value Date    .1 (H) 11/29/2023    PTH 84.1 (H) 05/18/2023    PTH 41.0 06/03/2020    CALCIUM 14.2 (HH) 11/29/2023    CALCIUM 8.5 (L) 10/18/2023    CALCIUM 8.7 10/17/2023    ALBUMIN 2.9 (L) 11/29/2023     ALBUMIN 2.2 (L) 10/18/2023    ALBUMIN 2.1 (L) 10/17/2023    PHOS 3.0 10/13/2023    PHOS 2.2 (L) 09/17/2023    PHOS 2.0 (L) 08/17/2023    ALKPHOS 232 (H) 11/29/2023    TSH 0.530 11/29/2023    MYPHROHI22YE 11 (L) 11/29/2023    ESTGFRAFRICA SEE COMMENT 06/03/2023    EGFRNONAA SEE COMMENT 06/03/2023     - In addition to above labs, lithium level also noted to be elevated on admission    - Daily intake of calcium:  None  - Daily intake of vitamin D: None    - Taking lithium or hydrochlorothiazide: yes     - Most recent DEXA:  2015  IMPRESSION:    Low bone mass. The estimated 10 year probability of hip fracture is 0.4% and of a major osteoporotic fracture 3.5%, respectively, using FRAX.    - If PHPT suspected, surgical indications:    No   Yes  []    [x]  Nephrolithiasis  (CTA from 7/2023 showed possible kidney stones versus calcifications)  []    []  Hypercalcuria (never completed)  []    [x]  Impaired renal function (GFR less than 60 mL/minute)  [x]    []  Osteoporosis (BDS less than -2.5, fragility fracture, or FRAX [>20% or >3% respectively])  []    [x]  Serum calcium level greater than or equal to 11.5      Likely not a surgical candidate despite the above given her co-morbidities and such    - Current symptoms:  Altered mentation, depression hx (on lithium), elevated lipase (no abdominal pain currently).       Medications and/or Treatments Impacting Glycemic Control:  Immunotherapy:    Immunosuppressants       None          Steroids:   Hormones (From admission, onward)      Start     Stop Route Frequency Ordered    11/29/23 0844  melatonin tablet 6 mg         -- Oral Nightly PRN 11/29/23 0746    11/29/23 0622  calcitonin injection 162 Units         12/01/23 0859 SubQ 2 times daily 11/29/23 0622          Pressors:    Autonomic Drugs (From admission, onward)      None            (Not in a hospital admission)      Current Facility-Administered Medications   Medication Dose Route Frequency Provider Last Rate Last Admin     acetaminophen tablet 650 mg  650 mg Oral Q4H PRN Montrell Pineda Jr., PA-C        aluminum-magnesium hydroxide-simethicone 200-200-20 mg/5 mL suspension 30 mL  30 mL Oral QID PRN Montrell Pineda Jr., PA-C        bisacodyL suppository 10 mg  10 mg Rectal Daily PRN Montrell Pineda Jr., PA-C        calcitonin injection 162 Units  4 Units/kg Subcutaneous BID Montrell Pineda Jr., PA-C   162 Units at 11/29/23 0752    [START ON 11/30/2023] cefTRIAXone (ROCEPHIN) 1 g in dextrose 5 % in water (D5W) 100 mL IVPB (MB+)  1 g Intravenous Q24H Montrell Pineda Jr., PA-C        ferrous sulfate tablet 1 each  1 tablet Oral Daily Montrell Pineda Jr., PA-C   1 each at 11/29/23 0807    furosemide tablet 20 mg  20 mg Oral Daily Montrell Pineda Jr., PA-C   20 mg at 11/29/23 0807    lactulose 20 gram/30 mL solution Soln 30 g  30 g Oral TID Montrell Pineda Jr., PA-C   30 g at 11/29/23 0813    levetiracetam 500 mg/5 mL (5 mL) liquid Soln 1,000 mg  1,000 mg Oral BID Montrell Pineda Jr., PA-C   1,000 mg at 11/29/23 0812    lithium CR tablet 600 mg  600 mg Oral QHS Montrell Pineda Jr., PA-C        melatonin tablet 6 mg  6 mg Oral Nightly PRN Montrell Pineda Jr., PA-C        OLANZapine tablet 5 mg  5 mg Oral QHS Montrell Pineda Jr., PA-C        ondansetron disintegrating tablet 8 mg  8 mg Oral Q8H PRN Montrell Pineda Jr., PA-C        pantoprazole suspension 40 mg  40 mg Oral BID Montrell Pineda Jr., PA-C   40 mg at 11/29/23 0810    polyethylene glycol packet 17 g  17 g Oral Daily PRN Montrell Pineda Jr., PA-C        rifAXIMin tablet 550 mg  550 mg Oral BID Montrell Pineda Jr., PA-C   550 mg at 11/29/23 0807    simethicone chewable tablet 80 mg  1 tablet Oral QID PRN Montrell Pineda Jr., PA-C        sodium chloride 0.9% flush 5 mL  5 mL Intravenous PRN Montrell Pineda Jr., PA-C        spironolactone tablet 100 mg  100 mg Oral Daily Montrell Pineda  , IRVIN   100 mg at 11/29/23 0906    vitamin D 1000 units tablet 2,000 Units  2,000 Units Oral Daily Eriberto Cesar, DO         Current Outpatient Medications   Medication Sig Dispense Refill    ALPRAZolam (XANAX) 0.25 MG tablet Take 1 tablet (0.25 mg total) by mouth 2 (two) times daily as needed for Anxiety.      brimonidine-timoloL (COMBIGAN) 0.2-0.5 % Drop Place 1 drop into both eyes 2 (two) times a day.      ferrous sulfate (FEOSOL) 325 mg (65 mg iron) Tab tablet Take 325 mg by mouth once daily.      furosemide (LASIX) 20 MG tablet Take 20 mg by mouth once daily.      lactulose (CHRONULAC) 20 gram/30 mL Soln Take 45 mLs (30 g total) by mouth 3 (three) times daily. TITRATE TO 3-4 BOWEL MOVEMENTS DAILY. 4050 mL 2    levetiracetam 500 mg/5 mL (5 mL) Soln Take 10 mLs (1,000 mg total) by mouth 2 (two) times daily. 600 mL 11    lithium (LITHOBID) 300 MG CR tablet Take 2 tablets (600 mg total) by mouth every evening. 60 tablet 0    miconazole nitrate 2% (MICOTIN) 2 % Oint Apply topically 2 (two) times daily. (Patient taking differently: Apply topically 2 (two) times daily. To face as needed)  0    OLANZapine (ZYPREXA) 5 MG tablet Take 1 tablet (5 mg total) by mouth every evening. 30 tablet 11    pantoprazole (PROTONIX) 40 mg suspension Take 1 packet (40 mg total) by mouth 2 (two) times daily. 60 packet 11    rifAXIMin (XIFAXAN) 550 mg Tab Take 1 tablet (550 mg total) by mouth 2 (two) times daily. 180 tablet 3    spironolactone (ALDACTONE) 100 MG tablet Take 1 tablet (100 mg total) by mouth once daily. 30 tablet 11       Interval HPI:   Overnight events: Alert at this time but not oriented.  Confused on reviewing history and unable to give relevant info with regards to her hydration.  Denies any recent nausea/vomiting or recent decrease in oral intake.  No abdominal pain.      ROS:  Altered mentation limits full ROS    Labs Reviewed and Include:  Lab Results   Component Value Date    WBC 9.19 11/29/2023    HGB  "14.5 11/29/2023    HCT 44.2 11/29/2023     (H) 11/29/2023     (L) 11/29/2023       Recent Labs   Lab 11/29/23  0329   GLU 99   CALCIUM 14.2*   ALBUMIN 2.9*   PROT 7.2   *   K 4.9   CO2 25   *   BUN 37*   CREATININE 1.4   ALKPHOS 232*   *   *   BILITOT 4.8*     Lab Results   Component Value Date    HGBA1C <4.0 (A) 05/29/2023       Nutritional status:   Body mass index is 16.33 kg/m².  Lab Results   Component Value Date    ALBUMIN 2.9 (L) 11/29/2023    ALBUMIN 2.2 (L) 10/18/2023    ALBUMIN 2.1 (L) 10/17/2023     No results found for: "PREALBUMIN"    Estimated Creatinine Clearance: 28.3 mL/min (based on SCr of 1.4 mg/dL).      PHYSICAL EXAMINATION:  Vitals:    11/29/23 0900   BP: 118/61   Pulse: 76   Resp: 14   Temp:      Body mass index is 16.33 kg/m².     Physical Exam  Vitals reviewed.   Constitutional:       Appearance: She is ill-appearing.   Eyes:      Extraocular Movements: Extraocular movements intact.   Cardiovascular:      Rate and Rhythm: Normal rate.      Pulses: Normal pulses.   Pulmonary:      Effort: Pulmonary effort is normal.   Abdominal:      General: Abdomen is flat.      Palpations: Abdomen is soft.   Musculoskeletal:      Comments: Thin appearing.     Neurological:      Mental Status: She is alert. She is disoriented.        .     ASSESSMENT and PLAN:    Psychiatric  Bipolar 1 disorder, depressed, severe  Long standing Bipolar disorder.  Lithium has been in use since at least 2015 and since that time serum calcium levels have intermittently been 10 or higher.  Now with supra-therapeutic lithium level.  Possibility for 4 gland parathyroid hyperplasia from long standing lithium use.        Renal/  Hypercalcemia  Review of labs show a pattern have shown prior concerns for elevated or normal PTH in the setting of elevated serum calcium.  Likely primary hyperparathyroidism as a factor but also use of chronic lithium can play a small role as well.  Dehydration " suspected with her acute presentation and recent reported increase urinary frequency.     Therapy given so far has included 2 liters LR bolus and calcitonin x 1    Recommendations:  - Hydration as tolerated, prefer 150-200 ml/hr at first (normal saline preferred).  Can use PRN Lasix or for volume overload (goal urine output would be 100-150 ml/hr).  Defer to primary on rate of IV fluids tolerated.  - Agree with calcitonin use in the first 48 hours  - Consider Bisphosphonate therapy if not responding to hydration over the next 24 hours, not needed just yet  - Monitor with renal function panel every 6-8 hours for now      Endocrine  Hyperparathyroidism  Labs have showed a pattern consistent with primary hyperparathyroidism since 2020.  PTH again elevated in setting of hypercalcemia.  Lithium can be playing a role in calcium and PTH elevations but with degree of hypercalcemia dehydration suspected.  Based on degree of hypercalcemia, renal dysfunction and possible history of kidney stones surgical intervention can be considered if medical management and hydration is not successful.  Should have repeat DXA performed outpatient to evaluate for underlying osteoporosis with inclusion of distal radius.  She refused neck ultrasound this admission to evaluate for parathyroid adenomas but also is unlikely to be a surgical candidate should an adenoma be found.        Severe protein-calorie malnutrition  Low BMI with dehydration likely playing a role in current hypercalcemia.         Eriberto eCsar, DO  Endocrinology

## 2023-11-29 NOTE — HPI
Patient is a 58-year-old woman with EtOH cirrhosis, bipolar disorder, anemia, thrombocytopenia, cholelithiasis, DVT with IVC filter in place, GI bleed, ESBL UTI history, seizure disorder, presenting from nursing [facility] with encephalopathy with unwitnessed fall. She was found down at her nursing home. Per chart review patient is normally A&O x3 1 month prior.  She does not take blood thinners. Patient denies any pain at this time. She reports she has been urinating frequently though difficult to ascertain history. She reports the last thing she remembers today before she arrived to the hospital was using the bathroom. She denies recent illness/fever/chills/nausea/vomiting/diarrhea/constipation.

## 2023-11-29 NOTE — SUBJECTIVE & OBJECTIVE
"Interval HPI:   Overnight events: No acute events noted from overnight.  Calcium has been down trending with IV fluids and calcitonin dosing x 2.        Labs Reviewed and Include:  Lab Results   Component Value Date    WBC 9.19 11/29/2023    HGB 14.5 11/29/2023    HCT 44.2 11/29/2023     (H) 11/29/2023     (L) 11/29/2023       Recent Labs   Lab 11/29/23  0329   GLU 99   CALCIUM 14.2*   ALBUMIN 2.9*   PROT 7.2   *   K 4.9   CO2 25   *   BUN 37*   CREATININE 1.4   ALKPHOS 232*   *   *   BILITOT 4.8*     Lab Results   Component Value Date    HGBA1C <4.0 (A) 05/29/2023       Nutritional status:   Body mass index is 16.33 kg/m².  Lab Results   Component Value Date    ALBUMIN 2.9 (L) 11/29/2023    ALBUMIN 2.2 (L) 10/18/2023    ALBUMIN 2.1 (L) 10/17/2023     No results found for: "PREALBUMIN"    Estimated Creatinine Clearance: 28.3 mL/min (based on SCr of 1.4 mg/dL).      PHYSICAL EXAMINATION:  Vitals:    11/29/23 0900   BP: 118/61   Pulse: 76   Resp: 14   Temp:      Body mass index is 16.33 kg/m².     Physical Exam  Vitals reviewed.   Constitutional:       Appearance: She is ill-appearing.   Eyes:      Extraocular Movements: Extraocular movements intact.   Cardiovascular:      Rate and Rhythm: Normal rate.      Pulses: Normal pulses.   Pulmonary:      Effort: Pulmonary effort is normal.   Abdominal:      General: Abdomen is flat.      Palpations: Abdomen is soft.   Musculoskeletal:      Comments: Thin appearing.     Neurological:      Mental Status: She is alert. She is disoriented.          "

## 2023-11-29 NOTE — H&P
[facility] - Emergency Dept  [facility] Medicine  History & Physical    Patient Name: Marely Hamilton  MRN: [id number]  Patient Class: IP- Inpatient  Admission Date: 11/29/2023  Attending Physician: Sarbjit Tavarez MD   Primary Care Provider: Viktor Ross MD         Patient information was obtained from patient, past medical records, and ER records.     Subjective:     Principal Problem:Acute metabolic encephalopathy    Chief Complaint:   Chief Complaint   Patient presents with    Fall     From [facility], unwitnessed fall, contusion on L cheek and reports pain to L hip, -blood thinner, -LOC, nurse at NH gave xanax to pt PTA        HPI: Patient is a 58-year-old woman with EtOH cirrhosis, bipolar disorder, anemia, thrombocytopenia, cholelithiasis, DVT with IVC filter in place, GI bleed, ESBL UTI history, seizure disorder, presenting from nursing home [facility] with encephalopathy with unwitnessed fall. She was found down at her nursing home. Per chart review patient is normally A&O x3 1 month prior.  She does not take blood thinners. Patient denies any pain at this time. She reports she has been urinating frequently though difficult to ascertain history. She reports the last thing she remembers today before she arrived to the hospital was using the bathroom. She denies recent illness/fever/chills/nausea/vomiting/diarrhea/constipation.    In the ED, vitals stable. Intake labs remarkable for Na 146, BUN 37, Cr 1.4, Calcium 14.2 (corrected 15), , Tbili 4.8, , , Ammonia 51, lipase 69, lactic 2.3, UA with 67 WBCs. She was given 2L IVF, calcitonin, ceftriaxone and admitted for further management.     Past Medical History:   Diagnosis Date    Addiction to drug     Alcohol abuse     Alcohol abuse, in remission 6/15/2015    14.5 weeks ago; AA weekly    Anemia     Anxiety 6/15/2015    Behavioral problem     Bipolar disorder     Bipolar disorder in remission 6/15/2015    Cirrhosis, Laennec's 6/15/2015     Depression     Encounter for blood transfusion     Epistaxis 6/15/2015    Fatigue     Glaucoma     Hematuria     Hepatic encephalopathy 6/15/2015    Hepatic enlargement     History of psychiatric care     History of psychiatric hospitalization     History of seizure 6/15/2015    1    hx of intentional Tylenol overdose 6/15/2015    2005- situational and hx of bipolar    Hx of psychiatric care     Macrocytic anemia 9/18/2015    6 units PRBC since June 2015    Jeana     Osteoarthritis     Other ascites 6/15/2015    Psychiatric exam requested by authority     Psychiatric problem     Psychosis 9/26/2019    Renal disorder     Seizures     Self-harming behavior     Suicide attempt     Therapy     Thrombocytopenia 6/15/2015       Past Surgical History:   Procedure Laterality Date    COSMETIC SURGERY      EMBOLIZATION N/A 6/11/2023    Procedure: EMBOLIZATION, BLOOD VESSEL;  Surgeon: Debbie Surgeon;  Location: [facility];  Service: Anesthesiology;  Laterality: N/A;    ESOPHAGOGASTRODUODENOSCOPY      ESOPHAGOGASTRODUODENOSCOPY N/A 7/6/2023    Procedure: EGD (ESOPHAGOGASTRODUODENOSCOPY);  Surgeon: Bernice Guillen MD;  [facility]  Service: Endoscopy;  Laterality: N/A;    ESOPHAGOGASTRODUODENOSCOPY N/A 7/11/2023    Procedure: EGD (ESOPHAGOGASTRODUODENOSCOPY);  Surgeon: Rangel Broussard MD;  Location: [facility] ([facility]  Service: Endoscopy;  Laterality: N/A;       Review of patient's allergies indicates:   Allergen Reactions    Sulfa (sulfonamide antibiotics) Rash    Codeine Nausea And Vomiting       No current facility-administered medications on file prior to encounter.     Current Outpatient Medications on File Prior to Encounter   Medication Sig    ALPRAZolam (XANAX) 0.25 MG tablet Take 1 tablet (0.25 mg total) by mouth 2 (two) times daily as needed for Anxiety.    brimonidine-timoloL (COMBIGAN) 0.2-0.5 % Drop Place 1 drop into both eyes 2 (two) times a day.    ferrous sulfate (FEOSOL) 325 mg (65 mg  iron) Tab tablet Take 325 mg by mouth once daily.    furosemide (LASIX) 20 MG tablet Take 20 mg by mouth once daily.    lactulose (CHRONULAC) 20 gram/30 mL Soln Take 45 mLs (30 g total) by mouth 3 (three) times daily. TITRATE TO 3-4 BOWEL MOVEMENTS DAILY.    levetiracetam 500 mg/5 mL (5 mL) Soln Take 10 mLs (1,000 mg total) by mouth 2 (two) times daily.    lithium (LITHOBID) 300 MG CR tablet Take 2 tablets (600 mg total) by mouth every evening.    miconazole nitrate 2% (MICOTIN) 2 % Oint Apply topically 2 (two) times daily. (Patient taking differently: Apply topically 2 (two) times daily. To face as needed)    OLANZapine (ZYPREXA) 5 MG tablet Take 1 tablet (5 mg total) by mouth every evening.    pantoprazole (PROTONIX) 40 mg suspension Take 1 packet (40 mg total) by mouth 2 (two) times daily.    rifAXIMin (XIFAXAN) 550 mg Tab Take 1 tablet (550 mg total) by mouth 2 (two) times daily.    spironolactone (ALDACTONE) 100 MG tablet Take 1 tablet (100 mg total) by mouth once daily.     Family History       Problem Relation (Age of Onset)    Alcohol abuse Father, Sister, Paternal Grandfather    Bipolar disorder Father    Hypertension Mother    Suicide Father          Tobacco Use    Smoking status: Never    Smokeless tobacco: Never   Substance and Sexual Activity    Alcohol use: Not Currently     Comment: hx of ETOH abuse with cirrhosis of liver    Drug use: No    Sexual activity: Not Currently     Review of Systems   Constitutional:  Positive for activity change and chills. Negative for fever.   Respiratory:  Negative for cough, chest tightness and shortness of breath.    Cardiovascular:  Negative for chest pain, palpitations and leg swelling.   Gastrointestinal:  Negative for abdominal pain, constipation, diarrhea, nausea and vomiting.   Genitourinary:  Positive for frequency. Negative for dysuria and hematuria.   Musculoskeletal:  Positive for arthralgias. Negative for back pain, gait problem and neck pain.   Skin:   Negative for rash and wound.   Neurological:  Positive for syncope and light-headedness. Negative for dizziness.   Psychiatric/Behavioral:  Negative for agitation and confusion. The patient is not nervous/anxious.      Objective:     Vital Signs (Most Recent):  Temp: 98.7 °F (37.1 °C) (11/29/23 0700)  Pulse: 76 (11/29/23 0900)  Resp: 14 (11/29/23 0900)  BP: 118/61 (11/29/23 0900)  SpO2: 100 % (11/29/23 0900) Vital Signs (24h Range):  Temp:  [97.2 °F (36.2 °C)-98.7 °F (37.1 °C)] 98.7 °F (37.1 °C)  Pulse:  [74-92] 76  Resp:  [11-24] 14  SpO2:  [91 %-100 %] 100 %  BP: (108-131)/(58-70) 118/61     Weight: 40.5 kg (89 lb 4.6 oz)  Body mass index is 16.33 kg/m².     Physical Exam  Vitals and nursing note reviewed.   Constitutional:       General: She is sleeping. She is not in acute distress.     Appearance: She is cachectic.   HENT:      Head: Normocephalic and atraumatic.      Mouth/Throat:      Mouth: Mucous membranes are dry.      Pharynx: No oropharyngeal exudate.   Eyes:      Conjunctiva/sclera: Conjunctivae normal.      Pupils: Pupils are equal, round, and reactive to light.   Cardiovascular:      Rate and Rhythm: Normal rate and regular rhythm.      Heart sounds: Normal heart sounds.   Pulmonary:      Effort: Pulmonary effort is normal. No respiratory distress.      Breath sounds: Normal breath sounds. No wheezing.   Abdominal:      General: Bowel sounds are normal. There is no distension.      Palpations: Abdomen is soft.      Tenderness: There is no abdominal tenderness.   Musculoskeletal:         General: No tenderness. Normal range of motion.      Cervical back: Normal range of motion and neck supple.   Lymphadenopathy:      Cervical: No cervical adenopathy.   Skin:     General: Skin is warm and dry.      Capillary Refill: Capillary refill takes less than 2 seconds.      Findings: Ecchymosis present. No rash.   Neurological:      Mental Status: She is oriented to person, place, and time.      Cranial Nerves:  No cranial nerve deficit.      Sensory: No sensory deficit.      Motor: Weakness (bilateral lower extremities) present.      Coordination: Coordination normal.      Comments: Poor participation   Psychiatric:         Behavior: Behavior normal.         Thought Content: Thought content normal.         Judgment: Judgment normal.              CRANIAL NERVES     CN III, IV, VI   Pupils are equal, round, and reactive to light.       Significant Labs: All pertinent labs within the past 24 hours have been reviewed.    Significant Imaging: I have reviewed all pertinent imaging results/findings within the past 24 hours.  Assessment/Plan:     * Acute metabolic encephalopathy  Acute cystitis    Patient presents from NH with acute mentation change in the setting of hypercalcemia and UTI.  - endocrinology consulted  - continue ceftriaxone  - follow cultures      Hypercalcemia  The patient has hypercalcemia that is currently uncontrolled. The patient has the following symptoms due to their hypercalcemia: polydypsia and/or polyuria, weakness, and encephalopathy. The hypercalcemia is likely due to Hyperparathyroidism. We will obtain the following labs to work up the hypercalcemia: PTH   PTH, Intact   Date Value Ref Range Status   11/29/2023 115.1 (H) 9.0 - 77.0 pg/mL Final    . We will treat the hypercalcemia with: IV fluids ordered at a rate of 100 ml/hr and Calcitonin. Their latest calcium has been reviewed and is listed below.  Ionized Calcium   Date Value Ref Range Status   08/02/2023 0.90 (L) 1.06 - 1.42 mmol/L Final     - endocrine consulted and following    Body mass index (BMI) less than 19  - BMI 16.33  - dietary consulted      Fall  - CTH, XR pelvis unremarkable  - no bony tenderness      Presence of IVC filter  - history of bleeds  - holding AC      Bipolar 1 disorder, depressed, severe  - continue olanzapine 5mg qhs  - continue lithium CR 600mg qhs  - lithium level pending      Severe protein-calorie  malnutrition  Nutrition consulted. Most recent weight and BMI monitored-     Measurements:  Wt Readings from Last 1 Encounters:   11/29/23 40.5 kg (89 lb 4.6 oz)   Body mass index is 16.33 kg/m².  - dietary consulted, history       Interventions/Recommendations (treatment strategy):   - boost TID      Thrombocytopenia  Patient was found to have thrombocytopenia, the likely etiology is secondary to cirrhosis/portal hypertension, will monitor the platelets Daily. Will transfuse if platelet count is <10k. Hold DVT prophylaxis if platelets are <50k. The patient's platelet results have been reviewed and are listed below.  Recent Labs   Lab 11/29/23  0329   *         Cirrhosis, Capri's  Patient with known Cirrhosis. Co-morbidities are present and inclusive of hepatic encephalopathy and malnutrition.  MELD-Na score calculated; MELD 3.0: 25 at 11/29/2023  8:21 AM  MELD-Na: 23 at 11/29/2023  8:21 AM  Calculated from:  Serum Creatinine: 1.4 mg/dL at 11/29/2023  3:29 AM  Serum Sodium: 146 mmol/L (Using max of 137 mmol/L) at 11/29/2023  3:29 AM  Total Bilirubin: 4.8 mg/dL at 11/29/2023  3:29 AM  Serum Albumin: 2.9 g/dL at 11/29/2023  3:29 AM  INR(ratio): 1.9 at 11/29/2023  8:21 AM  Age at listing (hypothetical): 58 years  Sex: Female at 11/29/2023  8:21 AM      Continue chronic meds. Will avoid any hepatotoxic meds, and monitor CBC/CMP/INR for synthetic function.       VTE Risk Mitigation (From admission, onward)           Ordered     IP VTE HIGH RISK PATIENT  Once         11/29/23 0746                      Montrell Pineda PA-C  Department of Hospital Medicine  [facility] - Emergency Dept

## 2023-11-29 NOTE — ED PROVIDER NOTES
Encounter Date: 11/29/2023       History     Chief Complaint   Patient presents with    Fall     From [facility], unwitnessed fall, contusion on L cheek and reports pain to L hip, -blood thinner, -LOC, nurse at NH gave xanax to pt PTA     HPI  58-year-old woman with EtOH cirrhosis, bipolar disorder, anemia, thrombocytopenia, cholelithiasis, DVT with IVC filter in place, GI bleed, ESBL UTI history, seizure disorder, presenting from nursing home [facility] with altered mental status, cough, urinary frequency, was found down after an unwitnessed fall.  Per chart review patient is normally A&O x3 1 month prior.  Does not take blood thinners    Patient denies any pain at this time, reports she is been urinating frequently though difficult to ascertain history.  Reports the last thing she remembers today before she arrived to the hospital was using the bathroom.  Denies recent illness/fever/chills/nausea/vomiting/diarrhea/constipation.    Review of patient's allergies indicates:   Allergen Reactions    Sulfa (sulfonamide antibiotics) Rash    Codeine Nausea And Vomiting     Past Medical History:   Diagnosis Date    Addiction to drug     Alcohol abuse     Alcohol abuse, in remission 6/15/2015    14.5 weeks ago; AA weekly    Anemia     Anxiety 6/15/2015    Behavioral problem     Bipolar disorder     Bipolar disorder in remission 6/15/2015    Cirrhosis, Laennec's 6/15/2015    Depression     Encounter for blood transfusion     Epistaxis 6/15/2015    Fatigue     Glaucoma     Hematuria     Hepatic encephalopathy 6/15/2015    Hepatic enlargement     History of psychiatric care     History of psychiatric hospitalization     History of seizure 6/15/2015    1    hx of intentional Tylenol overdose 6/15/2015    2005- situational and hx of bipolar    Hx of psychiatric care     Macrocytic anemia 9/18/2015    6 units PRBC since June 2015    Jeana     Osteoarthritis     Other ascites 6/15/2015    Psychiatric exam requested by authority      Psychiatric problem     Psychosis 9/26/2019    Renal disorder     Seizures     Self-harming behavior     Suicide attempt     Therapy     Thrombocytopenia 6/15/2015     Past Surgical History:   Procedure Laterality Date    COSMETIC SURGERY      EMBOLIZATION N/A 6/11/2023    Procedure: EMBOLIZATION, BLOOD VESSEL;  Surgeon: Debbie Surgeon;  Location: [facility];  Service: Anesthesiology;  Laterality: N/A;    ESOPHAGOGASTRODUODENOSCOPY      ESOPHAGOGASTRODUODENOSCOPY N/A 7/6/2023    Procedure: EGD (ESOPHAGOGASTRODUODENOSCOPY);  Surgeon: Bernice Guillen MD;  Location: [address]);  Service: Endoscopy;  Laterality: N/A;    ESOPHAGOGASTRODUODENOSCOPY N/A 7/11/2023    Procedure: EGD (ESOPHAGOGASTRODUODENOSCOPY);  Surgeon: Rangel Broussard MD;  Location: [address]);  Service: Endoscopy;  Laterality: N/A;     Family History   Problem Relation Age of Onset    Alcohol abuse Father     Suicide Father     Bipolar disorder Father     Alcohol abuse Sister     Hypertension Mother     Alcohol abuse Paternal Grandfather     Drug abuse Neg Hx     Dementia Neg Hx      Social History     Tobacco Use    Smoking status: Never    Smokeless tobacco: Never   Substance Use Topics    Alcohol use: Not Currently     Comment: hx of ETOH abuse with cirrhosis of liver    Drug use: No     Review of Systems    Physical Exam     Initial Vitals [11/29/23 0249]   BP Pulse Resp Temp SpO2   108/67 84 16 97.4 °F (36.3 °C) 95 %      MAP       --         Physical Exam    Nursing note and vitals reviewed.  Constitutional: No distress.   Cachectic, jaundice   HENT:   Head: Atraumatic.   Mouth/Throat: Mucous membranes are normal.   Dry mucous membranes   Eyes: Conjunctivae and EOM are normal. Right conjunctiva is not injected. Left conjunctiva is not injected. No scleral icterus.   Chronic appearing pupillary shape abnormalities bilaterally, conjunctiva quiet bl   Neck: Neck supple.    Full passive range of motion without pain.      Cardiovascular:  S1 normal, S2 normal, normal heart sounds and normal pulses.           No murmur heard.  Pulses:       Radial pulses are 2+ on the right side and 2+ on the left side.   Pulmonary/Chest: Effort normal. No respiratory distress.   Abdominal: Abdomen is soft. She exhibits no distension. There is no abdominal tenderness.   Flat, soft, nontender to diffuse palpation, no Tang's sign   Musculoskeletal:         General: Normal range of motion.      Cervical back: Full passive range of motion without pain and neck supple.      Comments: Moving all extremities to command, decreased strength in bilateral lower extremities     Neurological: She is alert. No cranial nerve deficit. Gait normal.   AOx1   Skin: Skin is warm. No ecchymosis and no rash noted.   Scattered ecchymoses across upper extremities          ED Course   Procedures  Labs Reviewed   CBC W/ AUTO DIFFERENTIAL - Abnormal; Notable for the following components:       Result Value    RBC 3.96 (*)      (*)     MCH 36.6 (*)     RDW 15.0 (*)     Platelets 106 (*)     Lymph % 15.5 (*)     All other components within normal limits   COMPREHENSIVE METABOLIC PANEL - Abnormal; Notable for the following components:    Sodium 146 (*)     Chloride 117 (*)     BUN 37 (*)     Calcium 14.2 (*)     Albumin 2.9 (*)     Total Bilirubin 4.8 (*)     Alkaline Phosphatase 232 (*)      (*)      (*)     eGFR 43.9 (*)     Anion Gap 4 (*)     All other components within normal limits   LACTIC ACID, PLASMA - Abnormal; Notable for the following components:    Lactate (Lactic Acid) 2.3 (*)     All other components within normal limits   LIPASE - Abnormal; Notable for the following components:    Lipase 69 (*)     All other components within normal limits   URINALYSIS, REFLEX TO URINE CULTURE - Abnormal; Notable for the following components:    Appearance, UA Hazy (*)     Leukocytes, UA 3+ (*)     All other components within normal limits    Narrative:      Specimen Source->Urine   URINALYSIS MICROSCOPIC - Abnormal; Notable for the following components:    WBC, UA 67 (*)     Bacteria Many (*)     All other components within normal limits    Narrative:     Specimen Source->Urine   AMMONIA - Abnormal; Notable for the following components:    Ammonia 51 (*)     All other components within normal limits   CK - Abnormal; Notable for the following components:    CPK 8 (*)     All other components within normal limits    Narrative:     ADD ON CPK PER DR CHARLINE TALAVERA/ORDER# [id number] @ 4:41AM   PROTIME-INR - Abnormal; Notable for the following components:    Prothrombin Time 19.8 (*)     INR 1.9 (*)     All other components within normal limits   LITHIUM LEVEL - Abnormal; Notable for the following components:    Lithium Level 1.6 (*)     All other components within normal limits   PTH, INTACT - Abnormal; Notable for the following components:    PTH, Intact 115.1 (*)     All other components within normal limits    Narrative:     Collection Site:->Peripheral Vein   VITAMIN D - Abnormal; Notable for the following components:    Vit D, 25-Hydroxy 11 (*)     All other components within normal limits    Narrative:     Collection Site:->Peripheral Vein   CULTURE, URINE   CULTURE, BLOOD   CULTURE, BLOOD   MAGNESIUM   TROPONIN I   TSH   B-TYPE NATRIURETIC PEPTIDE    Narrative:     ADD ON CPK PER DR CHARLINE TALAVERA/ORDER# [id number] @ 4:41AM   CK          Imaging Results              X-Ray Pelvis Routine AP (Final result)  Result time 11/29/23 06:20:52      Final result by Mumtaz Penny MD (11/29/23 06:20:52)                   Impression:      Noting limitations above, no convincing evidence of acute displaced fracture or dislocation.      Electronically signed by: Mumtaz Penny  Date:    11/29/2023  Time:    06:20               Narrative:    EXAMINATION:  XR PELVIS ROUTINE AP    CLINICAL HISTORY:  fall;    TECHNIQUE:  AP view of the pelvis was  performed.    COMPARISON:  None.    FINDINGS:  Examination limited by suboptimal patient positioning and overlying bowel gas and stool.    Noting this, no definite evidence of acute displaced fracture or dislocation is identified.  Status post left total hip arthroplasty.  No definite hardware complication single provided view.    Partially imaged IVC filter.    Degenerative findings are noted involving the spine, sacroiliac joints, pubic symphysis, and right hip joint.  Moderate to large volume colonic stool partially imaged.    Phleboliths.    No definite radiopaque foreign body.                                       X-Ray Chest AP Portable (Final result)  Result time 11/29/23 05:29:50      Final result by Rangel Floyd MD (11/29/23 05:29:50)                   Impression:      As above.      Electronically signed by: Rangel Floyd MD  Date:    11/29/2023  Time:    05:29               Narrative:    EXAMINATION:  XR CHEST AP PORTABLE    CLINICAL HISTORY:  cough;    TECHNIQUE:  Single frontal view of the chest was performed.    COMPARISON:  09/16/2023    FINDINGS:  Cardiac monitoring leads overlie the chest.  The cardiac silhouette is not significantly enlarged.  There is stable mild elevation of the right hemidiaphragm.  There is probable subsegmental atelectasis or scarring at the right lung base.  The remaining lungs demonstrate chronic coarse interstitial attenuation.  No new large confluent airspace consolidation appreciated.  No significant volume of pleural fluid or pneumothorax identified.  The visualized osseous structures demonstrate mild degenerative changes.  Postsurgical changes are noted of the right upper abdomen as well as possible cholelithiasis.                                       CT Head Without Contrast (Final result)  Result time 11/29/23 04:28:42      Final result by Jered Mandujano MD (11/29/23 04:28:42)                   Impression:      No evidence of acute intracranial  abnormality.    Electronically signed by resident: Ce Vela  Date:    11/29/2023  Time:    04:19    Electronically signed by: Jered Mandujnao MD  Date:    11/29/2023  Time:    04:28               Narrative:    EXAMINATION:  CT HEAD WITHOUT CONTRAST    CLINICAL HISTORY:  Head trauma, abnormal mental status (Age 19-64y);    TECHNIQUE:  Low dose axial CT images obtained throughout the head without the use of intravenous contrast.  Axial, sagittal and coronal reconstructions were performed.    COMPARISON:  CT 07/05/2023, 06/22/2023    FINDINGS:  Mild generalized cerebral volume loss with compensatory prominence of the ventricles and sulci.  There is patchy hypoattenuation through the supratentorial white matter which is nonspecific but may represent chronic microvascular ischemic changes. No parenchymal mass effect, midline shift, hemorrhage, edema or major vascular distribution infarct.    Ventricles and sulci are normal in size for age without evidence of hydrocephalus.    No extra-axial blood or fluid collections.    No displaced calvarial fracture.    Mild mucosal thickening of the paranasal sinuses.  The mastoid air cells are clear.                                       Medications   calcitonin injection 162 Units (162 Units Subcutaneous Given 11/29/23 0752)   ferrous sulfate tablet 1 each (1 each Oral Given 11/29/23 0807)   furosemide tablet 20 mg (20 mg Oral Given 11/29/23 0807)   lactulose 20 gram/30 mL solution Soln 30 g (30 g Oral Given 11/29/23 0813)   levetiracetam 500 mg/5 mL (5 mL) liquid Soln 1,000 mg (1,000 mg Oral Given 11/29/23 0812)   OLANZapine tablet 5 mg (has no administration in time range)   pantoprazole suspension 40 mg (40 mg Oral Given 11/29/23 0810)   rifAXIMin tablet 550 mg (550 mg Oral Given 11/29/23 0807)   spironolactone tablet 100 mg (100 mg Oral Given 11/29/23 0906)   sodium chloride 0.9% flush 5 mL (has no administration in time range)   melatonin tablet 6 mg (has no  administration in time range)   ondansetron disintegrating tablet 8 mg (has no administration in time range)   polyethylene glycol packet 17 g (has no administration in time range)   bisacodyL suppository 10 mg (has no administration in time range)   simethicone chewable tablet 80 mg (has no administration in time range)   aluminum-magnesium hydroxide-simethicone 200-200-20 mg/5 mL suspension 30 mL (has no administration in time range)   acetaminophen tablet 650 mg (has no administration in time range)   lithium CR tablet 600 mg (has no administration in time range)   cefTRIAXone (ROCEPHIN) 1 g in dextrose 5 % in water (D5W) 100 mL IVPB (MB+) (has no administration in time range)   lactated ringers bolus 1,000 mL (0 mLs Intravenous Stopped 11/29/23 0516)   cefTRIAXone (ROCEPHIN) 1 g in dextrose 5 % in water (D5W) 100 mL IVPB (MB+) (0 g Intravenous Stopped 11/29/23 0511)   lactated ringers bolus 1,000 mL (0 mLs Intravenous Stopped 11/29/23 0615)     Medical Decision Making  58-year-old woman with EtOH cirrhosis, bipolar disorder, anemia, thrombocytopenia, cholelithiasis, DVT with IVC filter in place, GI bleed, ESBL UTI history, seizure disorder, [facility] with altered mental status, cough, urinary frequency, was found down after an unwitnessed fall.     Differential includes:  Seizure/postictal state versus UTI/pneumonia versus encephalopathy versus intracranial hemorrhage versus anemia vs hypercalcemia  Elevated LFTs may be 2/2 cirrhosis, nontender abdomen on exam, less concern for cholecystitis/choledocholithiasis.  Significant hypercalcemia 14.2 (corrected to 15 given hypoalbuminemia), given 2 L IVF, mild hypernatremia. AMS may be due to UTI vs hypercalcemia. Given ceftriaxone in ED, blood cx/ucx obtained. No signs of sepsis at this time, afebrile with no leukocytosis (though WBC increased from baseline), BP and HR normal.    Admitted to hospital medicine for hypercalcemia, AMS, UTI,  discussed with hospitalist.              Amount and/or Complexity of Data Reviewed  Labs: ordered.  Radiology: ordered.    Risk  Decision regarding hospitalization.               ED Course as of 11/29/23 1003   Wed Nov 29, 2023   0437 Per chart review is normally oriented A&O x3 at baseline, consistently A&O x1 today on exam [LF]   0511 Nontender abdomen to palpation, no Tang's tenderness [LF]      ED Course User Index  [LF] Esperanza pSencer MD                           Clinical Impression:  Final diagnoses:  [W19.XXXA] Fall          ED Disposition Condition    Admit                 Esperanza Spencer MD  11/29/23 1008

## 2023-11-29 NOTE — CONSULTS
[facility] - [facility] Dept  Endocrinology  Consult Note    Consult Requested by: Sarbjit Tavarez MD   Reason for admit: Acute metabolic encephalopathy    HISTORY OF PRESENT ILLNESS:  Marely Hamilton is a 58-year-old female with a reported medical history of EtOH cirrhosis, bipolar disorder, anemia, thrombocytopenia, cholelithiasis, DVT with IVC filter in place, GI bleed, ESBL UTI history and seizure disorder.  She was sent from her nursing facility after a fall with altered mentation, cough and urinary frequency.  Difficult historian and she is alert but not oriented on initial assessment.  Laboratory workup included elevated calcium levels and PTH also elevated.  Known history of bipolar disorder with use of Lithium.  Has had prior labs concerning for a pattern of primary hyperparathyroidism in the past.  Never evaluated by endocrinology.  She denies history of fractures or kidney stones.  Also denies ever being told of elevated calcium levels or abnormal parathyroid function in the past.      Regarding Hypercalcemia and/or Primary Hyperparathyroidism:    - Notable for albumin corrected calcium elevations on labs intermittently since 2015  - PTH has been inappropriately normal or mildly elevated since at least 2020    She had similar labs on admission in May 2023 with calcium acutely elevated and at that time Reclast and calcitonin were given with improvement in serum calcium and actually a slightly low serum calcium when corrected for albumin in the days/weeks following that.  Thoughts were that dehydration and supra-therapeutic lithium levels were the cause.  She was hydrated on that admission and plans were to remove lithium and pursue other therapies with psychiatry.       Lab Results   Component Value Date    .1 (H) 11/29/2023    PTH 84.1 (H) 05/18/2023    PTH 41.0 06/03/2020    CALCIUM 14.2 (HH) 11/29/2023    CALCIUM 8.5 (L) 10/18/2023    CALCIUM 8.7 10/17/2023    ALBUMIN 2.9 (L) 11/29/2023     ALBUMIN 2.2 (L) 10/18/2023    ALBUMIN 2.1 (L) 10/17/2023    PHOS 3.0 10/13/2023    PHOS 2.2 (L) 09/17/2023    PHOS 2.0 (L) 08/17/2023    ALKPHOS 232 (H) 11/29/2023    TSH 0.530 11/29/2023    [id number] 11 (L) 11/29/2023    ESTGFRAFRICA SEE COMMENT 06/03/2023    EGFRNONAA SEE COMMENT 06/03/2023     - In addition to above labs, lithium level also noted to be elevated on admission    - Daily intake of calcium:  None  - Daily intake of vitamin D: None    - Taking lithium or hydrochlorothiazide: yes     - Most recent DEXA:  2015  IMPRESSION:    Low bone mass. The estimated 10 year probability of hip fracture is 0.4% and of a major osteoporotic fracture 3.5%, respectively, using FRAX.    - If PHPT suspected, surgical indications:    No   Yes  []    [x]  Nephrolithiasis  (CTA from 7/2023 showed possible kidney stones versus calcifications)  []    []  Hypercalcuria (never completed)  []    [x]  Impaired renal function (GFR less than 60 mL/minute)  [x]    []  Osteoporosis (BDS less than -2.5, fragility fracture, or FRAX [>20% or >3% respectively])  []    [x]  Serum calcium level greater than or equal to 11.5      Likely not a surgical candidate despite the above given her co-morbidities and such    - Current symptoms:  Altered mentation, depression hx (on lithium), elevated lipase (no abdominal pain currently).       Medications and/or Treatments Impacting Glycemic Control:  Immunotherapy:    Immunosuppressants       None          Steroids:   Hormones (From admission, onward)      Start     Stop Route Frequency Ordered    11/29/23 0844  melatonin tablet 6 mg         -- Oral Nightly PRN 11/29/23 0746    11/29/23 0622  calcitonin injection 162 Units         12/01/23 0859 SubQ 2 times daily 11/29/23 0622          Pressors:    Autonomic Drugs (From admission, onward)      None            (Not in a hospital admission)      Current Facility-Administered Medications   Medication Dose Route Frequency Provider Last Rate Last Admin     0.9%  NaCl infusion   Intravenous Continuous Sarbjit Tavarez MD        acetaminophen tablet 650 mg  650 mg Oral Q4H PRN Montrell Pineda Jr., PA-C        aluminum-magnesium hydroxide-simethicone 200-200-20 mg/5 mL suspension 30 mL  30 mL Oral QID PRN Montrell Pineda Jr., PA-C        bisacodyL suppository 10 mg  10 mg Rectal Daily PRN Montrell Pineda Jr., PA-C        calcitonin injection 162 Units  4 Units/kg Subcutaneous BID Montrell Pineda Jr., PA-C   162 Units at 11/29/23 0752    [START ON 11/30/2023] cefTRIAXone (ROCEPHIN) 1 g in dextrose 5 % in water (D5W) 100 mL IVPB (MB+)  1 g Intravenous Q24H Montrell Pineda Jr., PA-C        ferrous sulfate tablet 1 each  1 tablet Oral Daily Montrell Pineda Jr., PA-C   1 each at 11/29/23 0807    furosemide tablet 20 mg  20 mg Oral Daily Montrell Pineda Jr., PA-C   20 mg at 11/29/23 0807    lactulose 20 gram/30 mL solution Soln 30 g  30 g Oral TID Montrell Pineda Jr., PA-C   30 g at 11/29/23 0813    levetiracetam 500 mg/5 mL (5 mL) liquid Soln 1,000 mg  1,000 mg Oral BID Montrell Pineda Jr., PA-C   1,000 mg at 11/29/23 0812    lithium CR tablet 600 mg  600 mg Oral QHS Montrell Pineda Jr., PA-C        melatonin tablet 6 mg  6 mg Oral Nightly PRN Montrell Pineda Jr., PA-C        OLANZapine tablet 5 mg  5 mg Oral QHS Montrell Pineda Jr., PA-C        ondansetron disintegrating tablet 8 mg  8 mg Oral Q8H PRN Montrell Pineda Jr., PA-C        pantoprazole suspension 40 mg  40 mg Oral BID Montrell Pineda Jr., PA-C   40 mg at 11/29/23 0810    polyethylene glycol packet 17 g  17 g Oral Daily PRN Montrell Pineda Jr., IRVIN        rifAXIMin tablet 550 mg  550 mg Oral BID Montrell Pineda Jr., IRVIN   550 mg at 11/29/23 0807    simethicone chewable tablet 80 mg  1 tablet Oral QID PRN Montrell Pineda Jr., IRVIN        sodium chloride 0.9% flush 5 mL  5 mL Intravenous PRN Montrell Pineda Jr., IRVIN         spironolactone tablet 100 mg  100 mg Oral Daily Montrell Pineda Jr., PA-C   100 mg at 11/29/23 0906    vitamin D 1000 units tablet 2,000 Units  2,000 Units Oral Daily Eriberto Cesar, DO         Current Outpatient Medications   Medication Sig Dispense Refill    ALPRAZolam (XANAX) 0.25 MG tablet Take 1 tablet (0.25 mg total) by mouth 2 (two) times daily as needed for Anxiety.      brimonidine-timoloL (COMBIGAN) 0.2-0.5 % Drop Place 1 drop into both eyes 2 (two) times a day.      ferrous sulfate (FEOSOL) 325 mg (65 mg iron) Tab tablet Take 325 mg by mouth once daily.      furosemide (LASIX) 20 MG tablet Take 20 mg by mouth once daily.      lactulose (CHRONULAC) 20 gram/30 mL Soln Take 45 mLs (30 g total) by mouth 3 (three) times daily. TITRATE TO 3-4 BOWEL MOVEMENTS DAILY. 4050 mL 2    levetiracetam 500 mg/5 mL (5 mL) Soln Take 10 mLs (1,000 mg total) by mouth 2 (two) times daily. 600 mL 11    lithium (LITHOBID) 300 MG CR tablet Take 2 tablets (600 mg total) by mouth every evening. 60 tablet 0    miconazole nitrate 2% (MICOTIN) 2 % Oint Apply topically 2 (two) times daily. (Patient taking differently: Apply topically 2 (two) times daily. To face as needed)  0    OLANZapine (ZYPREXA) 5 MG tablet Take 1 tablet (5 mg total) by mouth every evening. 30 tablet 11    pantoprazole (PROTONIX) 40 mg suspension Take 1 packet (40 mg total) by mouth 2 (two) times daily. 60 packet 11    rifAXIMin (XIFAXAN) 550 mg Tab Take 1 tablet (550 mg total) by mouth 2 (two) times daily. 180 tablet 3    spironolactone (ALDACTONE) 100 MG tablet Take 1 tablet (100 mg total) by mouth once daily. 30 tablet 11       No new subjective & objective note has been filed under this hospital service since the last note was generated.  .     Interval HPI:   Overnight events: Alert at this time but not oriented.  Confused on reviewing history and unable to give relevant info with regards to her hydration.  Denies any recent nausea/vomiting or  "recent decrease in oral intake.  No abdominal pain.       ROS:  Altered mentation limits full ROS     Labs Reviewed and Include:        Lab Results   Component Value Date     WBC 9.19 11/29/2023     HGB 14.5 11/29/2023     HCT 44.2 11/29/2023      (H) 11/29/2023      (L) 11/29/2023             Recent Labs   Lab 11/29/23  0329   GLU 99   CALCIUM 14.2*   ALBUMIN 2.9*   PROT 7.2   *   K 4.9   CO2 25   *   BUN 37*   CREATININE 1.4   ALKPHOS 232*   *   *   BILITOT 4.8*            Lab Results   Component Value Date     HGBA1C <4.0 (A) 05/29/2023         Nutritional status:   Body mass index is 16.33 kg/m².        Lab Results   Component Value Date     ALBUMIN 2.9 (L) 11/29/2023     ALBUMIN 2.2 (L) 10/18/2023     ALBUMIN 2.1 (L) 10/17/2023      No results found for: "PREALBUMIN"     Estimated Creatinine Clearance: 28.3 mL/min (based on SCr of 1.4 mg/dL).        PHYSICAL EXAMINATION:      Vitals:     11/29/23 0900   BP: 118/61   Pulse: 76   Resp: 14   Temp:        Body mass index is 16.33 kg/m².     Physical Exam  Vitals reviewed.   Constitutional:       Appearance: She is ill-appearing.   Eyes:      Extraocular Movements: Extraocular movements intact.   Cardiovascular:      Rate and Rhythm: Normal rate.      Pulses: Normal pulses.   Pulmonary:      Effort: Pulmonary effort is normal.   Abdominal:      General: Abdomen is flat.      Palpations: Abdomen is soft.   Musculoskeletal:      Comments: Thin appearing.     Neurological:      Mental Status: She is alert. She is disoriented.         ASSESSMENT and PLAN:     Psychiatric  Bipolar 1 disorder, depressed, severe  Long standing Bipolar disorder.  Lithium has been in use since at least 2015 and since that time serum calcium levels have intermittently been 10 or higher.  Now with supra-therapeutic lithium level.  Possibility for 4 gland parathyroid hyperplasia from long standing lithium use.          Renal/  Hypercalcemia  Review of " labs show a pattern have shown prior concerns for elevated or normal PTH in the setting of elevated serum calcium.  Likely primary hyperparathyroidism as a factor but also use of chronic lithium can play a small role as well.  Dehydration suspected with her acute presentation and recent reported increase urinary frequency.      Therapy given so far has included 2 liters LR bolus and calcitonin x 1     Recommendations:  - Hydration as tolerated, prefer 150-200 ml/hr at first (normal saline preferred).  Can use PRN Lasix or for volume overload (goal urine output would be 100-150 ml/hr).  Defer to primary on rate of IV fluids tolerated.  - Agree with calcitonin use in the first 48 hours  - Consider Bisphosphonate therapy if not responding to hydration over the next 24 hours, not needed just yet  - Monitor with renal function panel every 6-8 hours for now        Endocrine  Hyperparathyroidism  Labs have showed a pattern consistent with primary hyperparathyroidism since 2020.  PTH again elevated in setting of hypercalcemia.  Lithium can be playing a role in calcium and PTH elevations but with degree of hypercalcemia dehydration suspected.  Based on degree of hypercalcemia, renal dysfunction and possible history of kidney stones surgical intervention can be considered if medical management and hydration is not successful.  Should have repeat DXA performed outpatient to evaluate for underlying osteoporosis with inclusion of distal radius.  She refused neck ultrasound this admission to evaluate for parathyroid adenomas but also is unlikely to be a surgical candidate should an adenoma be found.          Severe protein-calorie malnutrition  Low BMI with dehydration likely playing a role in current hypercalcemia.            Eriberto Cesar, DO  Endocrinology

## 2023-11-29 NOTE — ASSESSMENT & PLAN NOTE
Patient was found to have thrombocytopenia, the likely etiology is secondary to cirrhosis/portal hypertension, will monitor the platelets Daily. Will transfuse if platelet count is <10k. Hold DVT prophylaxis if platelets are <50k. The patient's platelet results have been reviewed and are listed below.  Recent Labs   Lab 11/29/23  0329   *

## 2023-11-29 NOTE — ASSESSMENT & PLAN NOTE
Long standing Bipolar disorder.  Lithium has been in use since at least 2015 and since that time serum calcium levels have intermittently been 10 or higher.  Now with supra-therapeutic lithium level this admission.  Possibility of 4 gland parathyroid hyperplasia from long standing lithium use.

## 2023-11-29 NOTE — ASSESSMENT & PLAN NOTE
Labs have showed a pattern consistent with primary hyperparathyroidism since 2020.  PTH again elevated in setting of hypercalcemia.  Lithium can be playing a role in calcium and PTH elevations but with degree of hypercalcemia dehydration suspected.  Based on degree of hypercalcemia, renal dysfunction and possible history of kidney stones surgical intervention can be considered if medical management and hydration is not successful.  Should have repeat DXA performed outpatient to evaluate for underlying osteoporosis with inclusion of distal radius.  She refused neck ultrasound this admission to evaluate for parathyroid adenomas but also is unlikely to be a surgical candidate should an adenoma be found.  May be able to consider Sensipar if not stable with hydration alone.

## 2023-11-29 NOTE — ASSESSMENT & PLAN NOTE
The patient has hypercalcemia that is currently uncontrolled. The patient has the following symptoms due to their hypercalcemia: polydypsia and/or polyuria, weakness, and encephalopathy. The hypercalcemia is likely due to Hyperparathyroidism. We will obtain the following labs to work up the hypercalcemia: PTH   PTH, Intact   Date Value Ref Range Status   11/29/2023 115.1 (H) 9.0 - 77.0 pg/mL Final    . We will treat the hypercalcemia with: IV fluids ordered at a rate of 100 ml/hr and Calcitonin. Their latest calcium has been reviewed and is listed below.  Ionized Calcium   Date Value Ref Range Status   08/02/2023 0.90 (L) 1.06 - 1.42 mmol/L Final     - endocrine consulted and following

## 2023-11-29 NOTE — ED NOTES
Marely Hamilton, a 58 y.o. female presents to the ED w/ complaint of unwitnessed fall. Report of contusion to L cheek, none noted on arrival.     Triage note:  Chief Complaint   Patient presents with    Fall     From [facility], unwitnessed fall, contusion on L cheek and reports pain to L hip, -blood thinner, -LOC, nurse at NH gave xanax to pt PTA     Review of patient's allergies indicates:   Allergen Reactions    Sulfa (sulfonamide antibiotics) Rash    Codeine Nausea And Vomiting     Past Medical History:   Diagnosis Date    Addiction to drug     Alcohol abuse     Alcohol abuse, in remission 6/15/2015    14.5 weeks ago; AA weekly    Anemia     Anxiety 6/15/2015    Behavioral problem     Bipolar disorder     Bipolar disorder in remission 6/15/2015    Cirrhosis, 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    Depression     Encounter for blood transfusion     Epistaxis 6/15/2015    Fatigue     Glaucoma     Hematuria     Hepatic encephalopathy 6/15/2015    Hepatic enlargement     History of psychiatric care     History of psychiatric hospitalization     History of seizure 6/15/2015    1    hx of intentional Tylenol overdose 6/15/2015    2005- situational and hx of bipolar    Hx of psychiatric care     Macrocytic anemia 9/18/2015    6 units PRBC since June 2015    [facility]     Osteoarthritis     Other ascites 6/15/2015    Psychiatric exam requested by authority     Psychiatric problem     Psychosis 9/26/2019    Renal disorder     Seizures     Self-harming behavior     Suicide attempt     Therapy     Thrombocytopenia 6/15/2015

## 2023-11-29 NOTE — ED NOTES
Nurses Note -- 4 Eyes      11/29/2023   7:42 AM      Skin assessed during: Q Shift Change      [] No Altered Skin Integrity Present    []Prevention Measures Documented      [x] Yes- Altered Skin Integrity Present or Discovered   [] LDA Added if Not in Epic (Describe Wound)   [] New Altered Skin Integrity was Present on Admit and Documented in LDA   [x] Wound Image Taken    Wound Care Consulted? No    Attending Nurse:  Gustavo Rangel RN/Staff Member:  Chandana

## 2023-11-29 NOTE — PROGRESS NOTES
Pharmacist Renal Dose Adjustment Note    Marely Hamilton is a 58 y.o. female being treated with the medication Enoxaparin    Patient Data:    Vital Signs (Most Recent):  Temp: 98.7 °F (37.1 °C) (11/29/23 0700)  Pulse: 74 (11/29/23 0730)  Resp: 17 (11/29/23 0730)  BP: 124/70 (11/29/23 0730)  SpO2: 99 % (11/29/23 0730) Vital Signs (72h Range):  Temp:  [97.2 °F (36.2 °C)-98.7 °F (37.1 °C)]   Pulse:  [74-92]   Resp:  [11-24]   BP: (108-126)/(58-70)   SpO2:  [91 %-99 %]      Recent Labs   Lab 11/29/23  0329   CREATININE 1.4     Serum creatinine: 1.4 mg/dL 11/29/23 0329  Estimated creatinine clearance: 28.3 mL/min    Enoxaparin 40 mg daily will be changed to Enoxaparin 30 mg daily    Pharmacist's Name: Alexander Mayorga  Pharmacist's Extension: [phone]

## 2023-11-29 NOTE — ED NOTES
Care assumed from Roselyn CALVERT    Patient resting in stretcher and is in NAD at this time. Pt is awake alert and oriented to person and place, VSS. Pt denies pain at this time. Pt updated on POC. Bed low and locked, SR up x2, call bell in patient reach. Pt remains on continuous cardiac monitor, continuous pulse ox, and auto BP cuff. Pt incontinent of urine on assessment. Patient gown and linen changed socks provided. Patient placed on external female cath    Patient identifiers verified and correct for Marely Hamilton   LOC: The patient is awake, alert and aware of environment with an appropriate affect, the patient is oriented to person and place .   APPEARANCE: Patient appears comfortable and in no acute distress, patient is clean and well groomed.  SKIN: Scattered bruising noted. Redness noted to sacrum. Meplex applied   MUSCULOSKELETAL: Patient moving all extremities spontaneously, no swelling noted.  RESPIRATORY: Airway is open and patent, respirations are spontaneous, patient has a normal effort and rate, no accessory muscle use noted, pt placed on continuous pulse ox with O2 sats noted at 95% on 2L NC  CARDIAC: Pt placed on cardiac monitor. Denies chest pain HR 77  GASTRO: Soft and non tender to palpation, no distention noted, normoactive bowel sounds present in all four quadrants. Pt states bowel movements have been regular.  : Pt denies any pain or frequency with urination.  NEURO: Pt opens eyes spontaneously, behavior appropriate to situation, follows commands, facial expression symmetrical, bilateral hand grasp equal and even, purposeful motor response noted, normal sensation in all extremities when touched with a finger.

## 2023-11-29 NOTE — ASSESSMENT & PLAN NOTE
Review of labs show a pattern have shown prior concerns for elevated or normal PTH in the setting of elevated serum calcium.  Likely primary hyperparathyroidism as a factor but also use of chronic lithium can play a small role as well.  Dehydration suspected with her acute presentation and recent reported increase urinary frequency.     Therapy given so far has included 2 liters LR bolus and calcitonin x 2  Continue IV NS since admission    Serum calcium down trending, now 11.7 when corrected for albumin    Recommendations:  - Hydration as tolerated, Now IV NS at 150 ml/hr.  Can use PRN Lasix or for volume overload (goal urine output would be 100-150 ml/hr).   - Agree with calcitonin use in the first 48 hours  - Can consider Bisphosphonate therapy if worsening, for now can hold.   - Monitor with renal function panel every 8-12 hours for now  - Given low Vitamin D, started 2000 IU cholecalciferol on 11/29/23

## 2023-11-29 NOTE — HPI
Marely Hamilton is a 58-year-old female with a reported medical history of EtOH cirrhosis, bipolar disorder, anemia, thrombocytopenia, cholelithiasis, DVT with IVC filter in place, GI bleed, ESBL UTI history and seizure disorder.  She was sent from her nursing facility after a fall with altered mentation, cough and urinary frequency.  Difficult historian and she is alert but not oriented on initial assessment.  Laboratory workup included elevated calcium levels and PTH also elevated.  Known history of bipolar disorder with use of Lithium.  Has had prior labs concerning for a pattern of primary hyperparathyroidism in the past.  Never evaluated by endocrinology.  She denies history of fractures or kidney stones.  Also denies ever being told of elevated calcium levels or abnormal parathyroid function in the past.      Regarding Hypercalcemia and/or Primary Hyperparathyroidism:    - Notable for albumin corrected calcium elevations on labs intermittently since 2015  - PTH has been inappropriately normal or mildly elevated since at least 2020    She had similar labs on admission in May 2023 with calcium acutely elevated and at that time Reclast and calcitonin were given with improvement in serum calcium and actually a slightly low serum calcium when corrected for albumin in the days/weeks following that.  Thoughts were that dehydration and supra-therapeutic lithium levels were the cause.  She was hydrated on that admission and plans were to remove lithium and pursue other therapies with psychiatry.       Lab Results   Component Value Date    .1 (H) 11/29/2023    PTH 84.1 (H) 05/18/2023    PTH 41.0 06/03/2020    CALCIUM 14.2 (HH) 11/29/2023    CALCIUM 8.5 (L) 10/18/2023    CALCIUM 8.7 10/17/2023    ALBUMIN 2.9 (L) 11/29/2023    ALBUMIN 2.2 (L) 10/18/2023    ALBUMIN 2.1 (L) 10/17/2023    PHOS 3.0 10/13/2023    PHOS 2.2 (L) 09/17/2023    PHOS 2.0 (L) 08/17/2023    ALKPHOS 232 (H) 11/29/2023    TSH 0.530 11/29/2023     [id number] 11 (L) 11/29/2023    ESTGFRAFRICA SEE COMMENT 06/03/2023    EGFRNONAA SEE COMMENT 06/03/2023     - In addition to above labs, lithium level also noted to be elevated on admission    - Daily intake of calcium:  None  - Daily intake of vitamin D: None    - Taking lithium or hydrochlorothiazide: yes     - Most recent DEXA:  2015  IMPRESSION:    Low bone mass. The estimated 10 year probability of hip fracture is 0.4% and of a major osteoporotic fracture 3.5%, respectively, using FRAX.    - If PHPT suspected, surgical indications:    No   Yes  []    [x]  Nephrolithiasis  (CTA from 7/2023 showed possible kidney stones versus calcifications)  []    []  Hypercalcuria (never completed)  []    [x]  Impaired renal function (GFR less than 60 mL/minute)  [x]    []  Osteoporosis (BDS less than -2.5, fragility fracture, or FRAX [>20% or >3% respectively])  []    [x]  Serum calcium level greater than or equal to 11.5      Likely not a surgical candidate despite the above given her co-morbidities and such    - Current symptoms:  Altered mentation, depression hx (on lithium), elevated lipase (no abdominal pain currently).

## 2023-11-30 LAB
ALBUMIN SERPL BCP-MCNC: 1.9 G/DL (ref 3.5–5.2)
ALBUMIN SERPL BCP-MCNC: 1.9 G/DL (ref 3.5–5.2)
ALBUMIN SERPL BCP-MCNC: 2 G/DL (ref 3.5–5.2)
ALBUMIN SERPL BCP-MCNC: 2.2 G/DL (ref 3.5–5.2)
ALP SERPL-CCNC: 160 U/L (ref 55–135)
ALT SERPL W/O P-5'-P-CCNC: 72 U/L (ref 10–44)
ANION GAP SERPL CALC-SCNC: 3 MMOL/L (ref 8–16)
ANION GAP SERPL CALC-SCNC: 4 MMOL/L (ref 8–16)
ANION GAP SERPL CALC-SCNC: 4 MMOL/L (ref 8–16)
ANION GAP SERPL CALC-SCNC: 7 MMOL/L (ref 8–16)
AST SERPL-CCNC: 108 U/L (ref 10–40)
BASOPHILS # BLD AUTO: 0.01 K/UL (ref 0–0.2)
BASOPHILS NFR BLD: 0.2 % (ref 0–1.9)
BILIRUB SERPL-MCNC: 5.5 MG/DL (ref 0.1–1)
BUN SERPL-MCNC: 15 MG/DL (ref 6–20)
BUN SERPL-MCNC: 23 MG/DL (ref 6–20)
BUN SERPL-MCNC: 23 MG/DL (ref 6–20)
BUN SERPL-MCNC: 25 MG/DL (ref 6–20)
CALCIUM SERPL-MCNC: 10 MG/DL (ref 8.7–10.5)
CALCIUM SERPL-MCNC: 10.6 MG/DL (ref 8.7–10.5)
CALCIUM SERPL-MCNC: 11.3 MG/DL (ref 8.7–10.5)
CALCIUM SERPL-MCNC: 9.5 MG/DL (ref 8.7–10.5)
CHLORIDE SERPL-SCNC: 115 MMOL/L (ref 95–110)
CHLORIDE SERPL-SCNC: 118 MMOL/L (ref 95–110)
CHLORIDE SERPL-SCNC: 119 MMOL/L (ref 95–110)
CHLORIDE SERPL-SCNC: 121 MMOL/L (ref 95–110)
CO2 SERPL-SCNC: 16 MMOL/L (ref 23–29)
CO2 SERPL-SCNC: 19 MMOL/L (ref 23–29)
CO2 SERPL-SCNC: 20 MMOL/L (ref 23–29)
CO2 SERPL-SCNC: 22 MMOL/L (ref 23–29)
CREAT SERPL-MCNC: 0.7 MG/DL (ref 0.5–1.4)
CREAT SERPL-MCNC: 0.8 MG/DL (ref 0.5–1.4)
DIFFERENTIAL METHOD: ABNORMAL
EOSINOPHIL # BLD AUTO: 0.4 K/UL (ref 0–0.5)
EOSINOPHIL NFR BLD: 7.1 % (ref 0–8)
ERYTHROCYTE [DISTWIDTH] IN BLOOD BY AUTOMATED COUNT: 14.6 % (ref 11.5–14.5)
EST. GFR  (NO RACE VARIABLE): >60 ML/MIN/1.73 M^2
GLUCOSE SERPL-MCNC: 112 MG/DL (ref 70–110)
GLUCOSE SERPL-MCNC: 150 MG/DL (ref 70–110)
GLUCOSE SERPL-MCNC: 89 MG/DL (ref 70–110)
GLUCOSE SERPL-MCNC: 99 MG/DL (ref 70–110)
HCT VFR BLD AUTO: 31.2 % (ref 37–48.5)
HGB BLD-MCNC: 10 G/DL (ref 12–16)
IMM GRANULOCYTES # BLD AUTO: 0.01 K/UL (ref 0–0.04)
IMM GRANULOCYTES NFR BLD AUTO: 0.2 % (ref 0–0.5)
LYMPHOCYTES # BLD AUTO: 0.8 K/UL (ref 1–4.8)
LYMPHOCYTES NFR BLD: 14.9 % (ref 18–48)
MAGNESIUM SERPL-MCNC: 1.6 MG/DL (ref 1.6–2.6)
MCH RBC QN AUTO: 36 PG (ref 27–31)
MCHC RBC AUTO-ENTMCNC: 32.1 G/DL (ref 32–36)
MCV RBC AUTO: 112 FL (ref 82–98)
MONOCYTES # BLD AUTO: 0.4 K/UL (ref 0.3–1)
MONOCYTES NFR BLD: 7.1 % (ref 4–15)
NEUTROPHILS # BLD AUTO: 3.6 K/UL (ref 1.8–7.7)
NEUTROPHILS NFR BLD: 70.5 % (ref 38–73)
NRBC BLD-RTO: 0 /100 WBC
PHOSPHATE SERPL-MCNC: 2 MG/DL (ref 2.7–4.5)
PHOSPHATE SERPL-MCNC: 2.1 MG/DL (ref 2.7–4.5)
PHOSPHATE SERPL-MCNC: 2.3 MG/DL (ref 2.7–4.5)
PHOSPHATE SERPL-MCNC: 2.4 MG/DL (ref 2.7–4.5)
PLATELET # BLD AUTO: 53 K/UL (ref 150–450)
PMV BLD AUTO: 9.4 FL (ref 9.2–12.9)
POTASSIUM SERPL-SCNC: 3.3 MMOL/L (ref 3.5–5.1)
POTASSIUM SERPL-SCNC: 3.5 MMOL/L (ref 3.5–5.1)
POTASSIUM SERPL-SCNC: 3.7 MMOL/L (ref 3.5–5.1)
POTASSIUM SERPL-SCNC: 3.7 MMOL/L (ref 3.5–5.1)
PROT SERPL-MCNC: 4.8 G/DL (ref 6–8.4)
RBC # BLD AUTO: 2.78 M/UL (ref 4–5.4)
SODIUM SERPL-SCNC: 140 MMOL/L (ref 136–145)
SODIUM SERPL-SCNC: 142 MMOL/L (ref 136–145)
SODIUM SERPL-SCNC: 142 MMOL/L (ref 136–145)
SODIUM SERPL-SCNC: 144 MMOL/L (ref 136–145)
WBC # BLD AUTO: 5.04 K/UL (ref 3.9–12.7)

## 2023-11-30 PROCEDURE — 25000003 PHARM REV CODE 250: Performed by: STUDENT IN AN ORGANIZED HEALTH CARE EDUCATION/TRAINING PROGRAM

## 2023-11-30 PROCEDURE — 99232 SBSQ HOSP IP/OBS MODERATE 35: CPT | Mod: GC,,, | Performed by: INTERNAL MEDICINE

## 2023-11-30 PROCEDURE — 84100 ASSAY OF PHOSPHORUS: CPT | Performed by: STUDENT IN AN ORGANIZED HEALTH CARE EDUCATION/TRAINING PROGRAM

## 2023-11-30 PROCEDURE — 99232 PR SUBSEQUENT HOSPITAL CARE,LEVL II: ICD-10-PCS | Mod: GC,,, | Performed by: INTERNAL MEDICINE

## 2023-11-30 PROCEDURE — 83735 ASSAY OF MAGNESIUM: CPT | Performed by: STUDENT IN AN ORGANIZED HEALTH CARE EDUCATION/TRAINING PROGRAM

## 2023-11-30 PROCEDURE — 21400001 HC TELEMETRY ROOM

## 2023-11-30 PROCEDURE — 80053 COMPREHEN METABOLIC PANEL: CPT | Performed by: PHYSICIAN ASSISTANT

## 2023-11-30 PROCEDURE — 80069 RENAL FUNCTION PANEL: CPT | Mod: 91 | Performed by: STUDENT IN AN ORGANIZED HEALTH CARE EDUCATION/TRAINING PROGRAM

## 2023-11-30 PROCEDURE — 51798 US URINE CAPACITY MEASURE: CPT

## 2023-11-30 PROCEDURE — 85025 COMPLETE CBC W/AUTO DIFF WBC: CPT | Performed by: PHYSICIAN ASSISTANT

## 2023-11-30 PROCEDURE — 63600175 PHARM REV CODE 636 W HCPCS: Mod: JG | Performed by: PHYSICIAN ASSISTANT

## 2023-11-30 PROCEDURE — 25000003 PHARM REV CODE 250: Performed by: PHYSICIAN ASSISTANT

## 2023-11-30 PROCEDURE — 36415 COLL VENOUS BLD VENIPUNCTURE: CPT | Performed by: PHYSICIAN ASSISTANT

## 2023-11-30 PROCEDURE — 63600175 PHARM REV CODE 636 W HCPCS: Performed by: STUDENT IN AN ORGANIZED HEALTH CARE EDUCATION/TRAINING PROGRAM

## 2023-11-30 PROCEDURE — 36415 COLL VENOUS BLD VENIPUNCTURE: CPT | Performed by: STUDENT IN AN ORGANIZED HEALTH CARE EDUCATION/TRAINING PROGRAM

## 2023-11-30 RX ORDER — SODIUM,POTASSIUM PHOSPHATES 280-250MG
2 POWDER IN PACKET (EA) ORAL ONCE
Status: COMPLETED | OUTPATIENT
Start: 2023-11-30 | End: 2023-11-30

## 2023-11-30 RX ORDER — CINACALCET 30 MG/1
30 TABLET, FILM COATED ORAL 2 TIMES DAILY WITH MEALS
Status: DISCONTINUED | OUTPATIENT
Start: 2023-11-30 | End: 2023-12-05

## 2023-11-30 RX ADMIN — FERROUS SULFATE TAB EC 325 MG (65 MG FE EQUIVALENT) 1 EACH: 325 (65 FE) TABLET DELAYED RESPONSE at 08:11

## 2023-11-30 RX ADMIN — OLANZAPINE 5 MG: 2.5 TABLET, FILM COATED ORAL at 09:11

## 2023-11-30 RX ADMIN — RIFAXIMIN 550 MG: 550 TABLET ORAL at 09:11

## 2023-11-30 RX ADMIN — LACTULOSE 30 G: 20 SOLUTION ORAL at 03:11

## 2023-11-30 RX ADMIN — LEVETIRACETAM 1000 MG: 500 SOLUTION ORAL at 09:11

## 2023-11-30 RX ADMIN — SPIRONOLACTONE 100 MG: 25 TABLET ORAL at 08:11

## 2023-11-30 RX ADMIN — PANTOPRAZOLE SODIUM 40 MG: 40 GRANULE, DELAYED RELEASE ORAL at 08:11

## 2023-11-30 RX ADMIN — LACTULOSE 30 G: 20 SOLUTION ORAL at 09:11

## 2023-11-30 RX ADMIN — CINACALCET 30 MG: 30 TABLET, FILM COATED ORAL at 06:11

## 2023-11-30 RX ADMIN — FUROSEMIDE 20 MG: 20 TABLET ORAL at 08:11

## 2023-11-30 RX ADMIN — Medication 6 MG: at 09:11

## 2023-11-30 RX ADMIN — CALCITONIN SALMON 162 UNITS: 200 INJECTION, SOLUTION INTRAMUSCULAR; SUBCUTANEOUS at 09:11

## 2023-11-30 RX ADMIN — CEFTRIAXONE 1 G: 1 INJECTION, POWDER, FOR SOLUTION INTRAMUSCULAR; INTRAVENOUS at 06:11

## 2023-11-30 RX ADMIN — PANTOPRAZOLE SODIUM 40 MG: 40 GRANULE, DELAYED RELEASE ORAL at 09:11

## 2023-11-30 RX ADMIN — CALCITONIN SALMON 162 UNITS: 200 INJECTION, SOLUTION INTRAMUSCULAR; SUBCUTANEOUS at 12:11

## 2023-11-30 RX ADMIN — LACTULOSE 30 G: 20 SOLUTION ORAL at 08:11

## 2023-11-30 RX ADMIN — LITHIUM CARBONATE 600 MG: 300 TABLET, FILM COATED, EXTENDED RELEASE ORAL at 09:11

## 2023-11-30 RX ADMIN — CHOLECALCIFEROL TAB 25 MCG (1000 UNIT) 2000 UNITS: 25 TAB at 08:11

## 2023-11-30 RX ADMIN — SODIUM CHLORIDE: 9 INJECTION, SOLUTION INTRAVENOUS at 07:11

## 2023-11-30 RX ADMIN — SODIUM CHLORIDE: 9 INJECTION, SOLUTION INTRAVENOUS at 04:11

## 2023-11-30 RX ADMIN — POTASSIUM & SODIUM PHOSPHATES POWDER PACK 280-160-250 MG 2 PACKET: 280-160-250 PACK at 03:11

## 2023-11-30 RX ADMIN — LEVETIRACETAM 1000 MG: 500 SOLUTION ORAL at 08:11

## 2023-11-30 RX ADMIN — RIFAXIMIN 550 MG: 550 TABLET ORAL at 08:11

## 2023-11-30 NOTE — NURSING
Pt remained free from injuries within the shift. Pt remained alert but still disoriented. Turned pt every 2hrs, well tolerated. Fall prevention protocol maintained. All needs attended.

## 2023-11-30 NOTE — PROGRESS NOTES
[facility] Medicine  Progress Note    Patient Name: Marely Hamilton  MRN: [id number]  Patient Class: IP- Inpatient   Admission Date: 11/29/2023  Length of Stay: 1 days  Attending Physician: Lavell Chatterjee*  Primary Care Provider: Viktor Ross MD        Subjective:     Principal Problem:Acute metabolic encephalopathy        HPI:  Patient is a 58-year-old woman with EtOH cirrhosis, bipolar disorder, anemia, thrombocytopenia, cholelithiasis, DVT with IVC filter in place, GI bleed, ESBL UTI history, seizure disorder, presenting from nursing [facility] with encephalopathy with unwitnessed fall. She was found down at her nursing home. Per chart review patient is normally A&O x3 1 month prior.  She does not take blood thinners. Patient denies any pain at this time. She reports she has been urinating frequently though difficult to ascertain history. She reports the last thing she remembers today before she arrived to the hospital was using the bathroom. She denies recent illness/fever/chills/nausea/vomiting/diarrhea/constipation.       Overview/Hospital Course:  In the ED, vitals stable. Intake labs remarkable for Na 146, BUN 37, Cr 1.4, Calcium 14.2 (corrected 15), , Tbili 4.8, , , Ammonia 51, lipase 69, lactic 2.3, UA with 67 WBCs. She was given 2L IVF, calcitonin, ceftriaxone and admitted for further management.    Interval History: No acute events. Oriented only to self. Able to answer a few simple questions this am which is reportedly improvement in mental status.   Urine culture pending  Ca improving    Review of Systems  Objective:     Vital Signs (Most Recent):  Temp: 97.3 °F (36.3 °C) (11/30/23 1226)  Pulse: 76 (11/30/23 1226)  Resp: 16 (11/30/23 1226)  BP: (!) 110/56 (11/30/23 1226)  SpO2: 96 % (11/30/23 1226) Vital Signs (24h Range):  Temp:  [96.1 °F (35.6 °C)-98.5 °F (36.9 °C)] 97.3 °F (36.3 °C)  Pulse:  [64-76] 76  Resp:  [16-20] 16  SpO2:  [93 %-98 %] 96  %  BP: (110-131)/(56-65) 110/56     Weight: 58.5 kg (129 lb)  Body mass index is 23.59 kg/m².    Intake/Output Summary (Last 24 hours) at 11/30/2023 1438  Last data filed at 11/30/2023 1226  Gross per 24 hour   Intake 360 ml   Output 702 ml   Net -342 ml         Physical Exam  Vitals and nursing note reviewed.   Constitutional:       General: She is sleeping. She is not in acute distress.     Appearance: She is cachectic.   HENT:      Head: Normocephalic and atraumatic.      Mouth/Throat:      Mouth: Mucous membranes are dry.      Pharynx: No oropharyngeal exudate.   Eyes:      Conjunctiva/sclera: Conjunctivae normal.      Pupils: Pupils are equal, round, and reactive to light.   Cardiovascular:      Rate and Rhythm: Normal rate and regular rhythm.      Heart sounds: Normal heart sounds.   Pulmonary:      Effort: Pulmonary effort is normal. No respiratory distress.      Breath sounds: Normal breath sounds. No wheezing.   Abdominal:      General: Bowel sounds are normal. There is no distension.      Palpations: Abdomen is soft.      Tenderness: There is no abdominal tenderness.   Musculoskeletal:         General: No tenderness. Normal range of motion.      Cervical back: Normal range of motion and neck supple.   Lymphadenopathy:      Cervical: No cervical adenopathy.   Skin:     General: Skin is warm and dry.      Capillary Refill: Capillary refill takes less than 2 seconds.      Findings: Ecchymosis present. No rash.   Neurological:      Mental Status: She is disoriented.      Comments: Poor participation             Significant Labs: All pertinent labs within the past 24 hours have been reviewed.    Significant Imaging: I have reviewed all pertinent imaging results/findings within the past 24 hours.    Assessment/Plan:      * Acute metabolic encephalopathy  Acute cystitis  Hypercalcemia    Patient presents from NH with acute mentation change in the setting of hypercalcemia and UTI.  - continue ceftriaxone  - follow  cultures      Body mass index (BMI) less than 19  - BMI 16.33  - dietary consulted      Fall  - CTH, XR pelvis unremarkable  - no bony tenderness      Presence of IVC filter  - history of bleeds  - not on AC      Hypercalcemia  The patient has hypercalcemia that is currently uncontrolled. The patient has the following symptoms due to their hypercalcemia: polydypsia and/or polyuria, weakness, and encephalopathy. The hypercalcemia is likely due to Hyperparathyroidism. We will obtain the following labs to work up the hypercalcemia: PTH   PTH, Intact   Date Value Ref Range Status   11/29/2023 115.1 (H) 9.0 - 77.0 pg/mL Final    . We will treat the hypercalcemia with: IV fluids and Calcitonin. Their latest calcium has been reviewed and is listed below.    Ionized Calcium   Date Value Ref Range Status   08/02/2023 0.90 (L) 1.06 - 1.42 mmol/L Final     - endocrine consulted and following    Bipolar 1 disorder, depressed, severe  - continue olanzapine 5mg qhs  - continue lithium CR 600mg qhs  - lithium level pending      Severe protein-calorie malnutrition  Nutrition consulted. Most recent weight and BMI monitored-     Measurements:  Wt Readings from Last 1 Encounters:   11/29/23 40.5 kg (89 lb 4.6 oz)   Body mass index is 16.33 kg/m².  - dietary consulted, history       Interventions/Recommendations (treatment strategy):   - boost TID      Thrombocytopenia  Patient was found to have thrombocytopenia, the likely etiology is secondary to cirrhosis/portal hypertension, will monitor the platelets Daily. Will transfuse if platelet count is <10k. Hold DVT prophylaxis if platelets are <50k. The patient's platelet results have been reviewed and are listed below.  Recent Labs   Lab 11/29/23  0329   *         Cirrhosis, Capri's  Patient with known Cirrhosis. Co-morbidities are present and inclusive of hepatic encephalopathy and malnutrition.  MELD-Na score calculated; MELD 3.0: 24 at 11/30/2023  7:50 AM  MELD-Na: 20 at  11/30/2023  7:50 AM  Calculated from:  Serum Creatinine: 0.7 mg/dL (Using min of 1 mg/dL) at 11/30/2023  7:50 AM  Serum Sodium: 142 mmol/L (Using max of 137 mmol/L) at 11/30/2023  7:50 AM  Total Bilirubin: 5.5 mg/dL at 11/30/2023  4:28 AM  Serum Albumin: 1.9 g/dL at 11/30/2023  7:50 AM  INR(ratio): 1.9 at 11/29/2023  8:21 AM  Age at listing (hypothetical): 58 years  Sex: Female at 11/30/2023  7:50 AM      Continue chronic meds. Will avoid any hepatotoxic meds, and monitor CBC/CMP/INR for synthetic function.   Continue lactulose      VTE Risk Mitigation (From admission, onward)           Ordered     IP VTE HIGH RISK PATIENT  Once         11/29/23 0746                    Discharge Planning   BRAYAN: 12/1/2023     Code Status: Full Code   Is the patient medically ready for discharge?: No    Reason for patient still in hospital (select all that apply): Patient trending condition, Treatment, and Consult recommendations  Discharge Plan A: Return to nursing home        Lavell Chatterjee MD  Department of Hospital Medicine   [facility] Surg

## 2023-11-30 NOTE — HOSPITAL COURSE
In the ED, vitals stable. Intake labs remarkable for Na 146, BUN 37, Cr 1.4, Calcium 14.2 (corrected 15), , Tbili 4.8, , , Ammonia 51, lipase 69, lactic 2.3, UA with 67 WBCs. She was given 2L IVF, calcitonin, ceftriaxone and admitted for further management. Continued on lactulose for treatment of HE, rocephin for treatment of UTI (completed). She was started on IVF and calcitonin. Endocrine consulted for hypercalcemia. Hyperparathyroid hypercalcemia. Lithium level elevated. Psychiatry consulted and lithium stopped. Calcium downtrended with these interventions. By 12/1, patient hypocalcemic. Lithium restarted 12/5. Correct calcium borderline low. Plan to continue lithium at 300mg daily. Patient likely at new mental baseline. She is not oriented to place, time, or situation. She is aware of this confusion. Patient stable for discharge 12/8 back to nursing facility.

## 2023-11-30 NOTE — ASSESSMENT & PLAN NOTE
The patient has hypercalcemia that is currently uncontrolled. The patient has the following symptoms due to their hypercalcemia: polydypsia and/or polyuria, weakness, and encephalopathy. The hypercalcemia is likely due to Hyperparathyroidism. We will obtain the following labs to work up the hypercalcemia: PTH   PTH, Intact   Date Value Ref Range Status   11/29/2023 115.1 (H) 9.0 - 77.0 pg/mL Final    . We will treat the hypercalcemia with: IV fluids and Calcitonin. Their latest calcium has been reviewed and is listed below.    Ionized Calcium   Date Value Ref Range Status   08/02/2023 0.90 (L) 1.06 - 1.42 mmol/L Final     - endocrine consulted and following

## 2023-11-30 NOTE — ASSESSMENT & PLAN NOTE
Patient with known Cirrhosis. Co-morbidities are present and inclusive of hepatic encephalopathy and malnutrition.  MELD-Na score calculated; MELD 3.0: 24 at 11/30/2023  7:50 AM  MELD-Na: 20 at 11/30/2023  7:50 AM  Calculated from:  Serum Creatinine: 0.7 mg/dL (Using min of 1 mg/dL) at 11/30/2023  7:50 AM  Serum Sodium: 142 mmol/L (Using max of 137 mmol/L) at 11/30/2023  7:50 AM  Total Bilirubin: 5.5 mg/dL at 11/30/2023  4:28 AM  Serum Albumin: 1.9 g/dL at 11/30/2023  7:50 AM  INR(ratio): 1.9 at 11/29/2023  8:21 AM  Age at listing (hypothetical): 58 years  Sex: Female at 11/30/2023  7:50 AM      Continue chronic meds. Will avoid any hepatotoxic meds, and monitor CBC/CMP/INR for synthetic function.   Continue lactulose

## 2023-11-30 NOTE — PLAN OF CARE
Recommendations     1. Recommend liberalizing diet to Regular for added variety to likely increase PO intake, fluid per MD.   2. Recommend Boost Plus/VHC with all meals for additional calories/PRO.   3. Recommend MVI + B-complex vitamin supplementation.   4. Recommend daily weights for accuracy.   5. RD to monitor and follow-up.     Goals: Meet % EEN/EPN by follow-up date.  Nutrition Goal Status: new  Communication of RD Recs: other (comment) (POC)

## 2023-11-30 NOTE — PLAN OF CARE
[facility] - Med Surg  Initial Discharge Assessment       Primary Care Provider: Viktor Ross MD    Admission Diagnosis: Fall [W19.XXXA]  Chest pain [R07.9]    Admission Date: 11/29/2023  Expected Discharge Date: 12/1/2023    Transition of Care Barriers: (P) None    Payor: [employer] MANAGED MEDICARE / Plan: [employer] MEDICARE HMO / Product Type: Capitation /     Extended Emergency Contact Information  Primary Emergency Contact: Zaria Hamilton  [place] [facility]  Home Phone: [phone]  Relation: Sister  Secondary Emergency Contact: Bridget Hamilton           [place], [place]  Home Phone: [phone]  Relation: Sister    Discharge Plan A: (P) Return to nursing home  Discharge Plan B: (P) Return to Nursing Home      [facility] [address]  [address]  [place] [address]  Phone: [phone] Fax: [phone]      CM went to patient's room to discuss discharge planning. Patient was asleep in bed  and per notes have some confusion. Called patient's sister Zaria Hamilton [phone] to conduct assessment. Patient lives at [facility] in [place]. Per patient's sister, prior to hospital admission patient required assistance x one person with ADLs and uses a wheelchair to get around. Will continue to follow for post acute needs.   Discharge Plan A and Plan B have been determined by review of patient's clinical status, future medical and therapeutic needs, and coverage/benefits for post-acute care in coordination with multidisciplinary team members.  Initial Assessment (most recent)       Adult Discharge Assessment - 11/30/23 1331          Discharge Assessment    Assessment Type Discharge Planning Assessment (P)      Confirmed/corrected address, phone number and insurance Yes (P)      Confirmed Demographics Correct on Facesheet (P)      Source of Information family (P)      Communicated BRAYAN with patient/caregiver Date not  available/Unable to determine (P)      Reason For Admission Acute Metabolic Encephalopathy (P)      People in Home facility resident (P)      Facility Arrived From: [facility] (P)      Do you expect to return to your current living situation? Yes (P)      Do you have help at home or someone to help you manage your care at home? Yes (P)      Who are your caregiver(s) and their phone number(s)? Facility Staff (P)      Prior to hospitilization cognitive status: -- (P)    Unable to assess    Current cognitive status: -- (P)    Unable to assess    Walking or Climbing Stairs ambulation difficulty, requires equipment (P)      Mobility Management wheelchair (P)      Dressing/Bathing bathing difficulty, assistance 1 person (P)      Home Accessibility wheelchair accessible (P)      Equipment Currently Used at Home wheelchair (P)      Readmission within 30 days? No (P)      Patient currently being followed by outpatient case management? No (P)      Do you currently have service(s) that help you manage your care at home? No (P)      Do you take prescription medications? Yes (P)      Do you have prescription coverage? Yes (P)      Coverage [employer] (P)      Do you have any problems affording any of your prescribed medications? No (P)      Is the patient taking medications as prescribed? yes (P)      Who is going to help you get home at discharge? [facility] staff (P)      How do you get to doctors appointments? other (see comments) (P)    [facility] transportation    Are you on dialysis? No (P)      Do you take coumadin? No (P)      DME Needed Upon Discharge  none (P)      Discharge Plan discussed with: Sibling (P)      Name(s) and Number(s) Zaria Joy [phone] (P)      Transition of Care Barriers None (P)      Discharge Plan A Return to nursing home (P)      Discharge Plan B Return to Nursing Home (P)         Physical Activity    On average, how many days per week do you engage in moderate to  strenuous exercise (like a brisk walk)? 0 days (P)      On average, how many minutes do you engage in exercise at this level? 0 min (P)         Financial Resource Strain    How hard is it for you to pay for the very basics like food, housing, medical care, and heating? Not hard at all (P)         Housing Stability    In the last 12 months, was there a time when you were not able to pay the mortgage or rent on time? No (P)      In the last 12 months, how many places have you lived? 1 (P)      In the last 12 months, was there a time when you did not have a steady place to sleep or slept in a shelter (including now)? No (P)         Transportation Needs    In the past 12 months, has lack of transportation kept you from medical appointments or from getting medications? No (P)      In the past 12 months, has lack of transportation kept you from meetings, work, or from getting things needed for daily living? No (P)         Food Insecurity    Within the past 12 months, you worried that your food would run out before you got the money to buy more. Never true (P)      Within the past 12 months, the food you bought just didn't last and you didn't have money to get more. Never true (P)         Social Connections    In a typical week, how many times do you talk on the phone with family, friends, or neighbors? More than three times a week (P)      How often do you attend Protestant or Latter-day services? Never (P)      Do you belong to any clubs or organizations such as Protestant groups, unions, fraternal or athletic groups, or school groups? No (P)      How often do you attend meetings of the clubs or organizations you belong to? Never (P)      Are you , , , , never , or living with a partner? Never  (P)         Alcohol Use    Q1: How often do you have a drink containing alcohol? Never (P)      Q2: How many drinks containing alcohol do you have on a typical day when you are drinking? Patient  does not drink (P)      Q3: How often do you have six or more drinks on one occasion? Never (P)                             NORMA Blanton  Case Management  [phone]

## 2023-11-30 NOTE — CONSULTS
[facility]  Adult Nutrition  Consult Note    SUMMARY     Recommendations    1. Recommend liberalizing diet to Regular for added variety to likely increase PO intake, fluid per MD.   2. Recommend Boost Plus/VHC with all meals for additional calories/PRO.   3. Recommend MVI + B-complex vitamin supplementation.   4. Recommend daily weights for accuracy.   5. RD to monitor and follow-up.    Goals: Meet % EEN/EPN by follow-up date.  Nutrition Goal Status: new  Communication of RD Recs: other (comment) (POC)    Assessment and Plan    Endocrine  Severe protein-calorie malnutrition  Malnutrition Type:  Context: chronic illness  Level: severe    Related to (etiology):   Decreased ability to consume sufficient energy    Signs and Symptoms (as evidenced by):   AMS     Malnutrition Characteristic Summary:  Weight Loss (Malnutrition): other (see comments) (29% x 1 month)  Energy Intake (Malnutrition): less than 75% for greater than or equal to 3 months  Subcutaneous Fat (Malnutrition): severe depletion  Muscle Mass (Malnutrition): severe depletion      Interventions/Recommendations (treatment strategy):  1. Recommend liberalizing diet to Regular for added variety to likely increase PO intake, fluid per MD. 2. Recommend Boost Plus/VHC with all meals for additional calories/PRO. 3. Recommend MVI + B-complex vitamin supplementation. 4. Recommend daily weights for accuracy. 5. RD to monitor and follow-up.    Collaboration of nutrition care with other providers     Nutrition Diagnosis Status:   New         Malnutrition Assessment  Malnutrition Context: chronic illness  Malnutrition Level: severe  Skin (Micronutrient): bruised, turgor reduced, thinned  Nails (Micronutrient): clubbed, nail ends curved  Hair/Scalp (Micronutrient): plucked easily, dull, lusterless, brittle  Eyes (Micronutrient): none  Extraoral (Micronutrient): none  Gums (Micronutrient): none  Lips/Mucous Membranes (Micronutrient):  (dry,  cracked)  Teeth (Micronutrient): carries, mottled, yellow-brown pigment  Tongue (Micronutrient): none  Neck/Chest (Micronutrient): bony prominence, muscle wasting, subcutaneous fat loss  Musculoskeletal/Lower Extremities: muscle control poor, subcutaneous fat loss, muscle wasting   Micronutrient Evaluation Summary: suspected deficiency   Weight Loss (Malnutrition): other (see comments) (29% x 1 month)  Energy Intake (Malnutrition): less than 75% for greater than or equal to 3 months  Subcutaneous Fat (Malnutrition): severe depletion  Muscle Mass (Malnutrition): severe depletion   Orbital Region (Subcutaneous Fat Loss): severe depletion  Upper Arm Region (Subcutaneous Fat Loss): severe depletion  Thoracic and Lumbar Region: severe depletion   Gully Region (Muscle Loss): severe depletion  Clavicle Bone Region (Muscle Loss): severe depletion  Clavicle and Acromion Bone Region (Muscle Loss): severe depletion  Scapular Bone Region (Muscle Loss): severe depletion  Dorsal Hand (Muscle Loss): severe depletion  Patellar Region (Muscle Loss): severe depletion  Anterior Thigh Region (Muscle Loss): severe depletion  Posterior Calf Region (Muscle Loss): severe depletion                 Reason for Assessment    Reason For Assessment: consult  Diagnosis: other (see comments) (Acute metabolic encephalopathy)  Relevant Medical History: alcohol abuse, anemia, depression, suicide attempt, crystal  Interdisciplinary Rounds: did not attend  General Information Comments: RD consulted for malnutrition. Pt is disoriented at this time. NFPE performed 11/30. Patient unable to recall last solid meal/BM. Patient states 's drank 5 marcia/day. No c/o N/V/D. Likely constipated. Denies difficulty chewing swallowing.  Nutrition Discharge Planning: Pending Clinical Course    Nutrition Risk Screen    Nutrition Risk Screen: difficulty chewing/swallowing    Nutrition/Diet History    Patient Reported Diet/Restrictions/Preferences: general  Spiritual,  "Cultural Beliefs, Mandaen Practices, Values that Affect Care: no    Anthropometrics    Temp: 97.3 °F (36.3 °C)  Height: 5' 2" (157.5 cm)  Height (inches): 62 in  Weight Method: Bed Scale  Weight: 58.5 kg (129 lb)  Weight (lb): 129 lb  Ideal Body Weight (IBW), Female: 110 lb  % Ideal Body Weight, Female (lb): 117.27 %  BMI (Calculated): 23.6       Lab/Procedures/Meds    Pertinent Labs Reviewed: reviewed  Pertinent Labs Comments: Cl 119, CO2 19, BUN 23, Ca 10.6, P 2.4, ALp 160, TP 4.8, TBili 5.5, , ALT 72  Pertinent Medications Reviewed: reviewed  Pertinent Medications Comments: calcitonin, ferrous sulfate, lasix, lactulose, keppra, lithium, olanzapine, pantoprazole, spironolactone, vit D, NaCl      Estimated/Assessed Needs    Weight Used For Calorie Calculations: 58.5 kg (129 lb)  Energy Calorie Requirements (kcal): 2048 kcal/d (35 kcal/kg)  Energy Need Method: Kcal/kg  Protein Requirements: 88 g/d (1.5 g/kg)  Weight Used For Protein Calculations: 58.5 kg (129 lb)        RDA Method (mL): 2048         Nutrition Prescription Ordered    Current Diet Order: Cardiac  Nutrition Order Comments: 1500 mL FR    Evaluation of Received Nutrient/Fluid Intake    I/O: -700 mL  Comments: LBM: unknown  % Intake of Estimated Energy Needs: 75 - 100 %  % Meal Intake: 0 - 25 %    Nutrition Risk    Level of Risk/Frequency of Follow-up: high (2x/ week)       Monitor and Evaluation    Food and Nutrient Intake: energy intake, food and beverage intake  Food and Nutrient Adminstration: diet order  Knowledge/Beliefs/Attitudes: food and nutrition knowledge/skill, beliefs and attitudes  Physical Activity and Function: nutrition-related ADLs and IADLs, factors affecting access to physical activity  Anthropometric Measurements: weight, weight change, body mass index  Biochemical Data, Medical Tests and Procedures: electrolyte and renal panel, gastrointestinal profile, glucose/endocrine profile, inflammatory profile, lipid " profile  Nutrition-Focused Physical Findings: overall appearance, extremities, muscles and bones, head and eyes, skin       Nutrition Follow-Up    RD Follow-up?: Yes    Cholo Blackwell Registration Eligible, Provisional LDN

## 2023-11-30 NOTE — NURSING
Nurses Note -- 4 Eyes      11/29/2023   10:48 PM      Skin assessed during: Admit      [] No Altered Skin Integrity Present    []Prevention Measures Documented      [x] Yes- Altered Skin Integrity Present or Discovered   [x] LDA Added if Not in Epic (Describe Wound)   [x] New Altered Skin Integrity was Present on Admit and Documented in LDA   [x] Wound Image Taken    Wound Care Consulted? Yes    Attending Nurse:  Quirino Peter RN    Second RN/Staff Member: Pooja Arango RN

## 2023-11-30 NOTE — PROGRESS NOTES
11/30/23 1130   WOCN Assessment   WOCN Total Time (mins) 30   Visit Date 11/30/23   Visit Time 1130   Consult Type New   WOCN Speciality Wound   Intervention assessed;changed;applied;chart review;coordination of care;orders        Altered Skin Integrity 10/11/23 2200 Sacral spine   Date First Assessed/Time First Assessed: 10/11/23 2200   Location: Sacral spine   Wound Image    Description of Altered Skin Integrity Intact skin with non-blanchable redness of localized area   Dressing Appearance Clean;Dry;Intact   Drainage Amount None   Red (%), Wound Tissue Color 100 %   Periwound Area Intact     [facility] Surg  Wound Care    Patient Name:  Marely Hamilton   MRN:  [id number]  Date: 11/30/2023  Diagnosis: Acute metabolic encephalopathy    History:     Past Medical History:   Diagnosis Date    Addiction to drug     Alcohol abuse     Alcohol abuse, in remission 6/15/2015    14.5 weeks ago; AA weekly    Anemia     Anxiety 6/15/2015    Behavioral problem     Bipolar disorder     Bipolar disorder in remission 6/15/2015    Cirrhosis, Laennec's 6/15/2015    Depression     Encounter for blood transfusion     Epistaxis 6/15/2015    Fatigue     Glaucoma     Hematuria     Hepatic encephalopathy 6/15/2015    Hepatic enlargement     History of psychiatric care     History of psychiatric hospitalization     History of seizure 6/15/2015    1    hx of intentional Tylenol overdose 6/15/2015    2005- situational and hx of bipolar    Hx of psychiatric care     Macrocytic anemia 9/18/2015    6 units PRBC since June 2015    Jeana     Osteoarthritis     Other ascites 6/15/2015    Psychiatric exam requested by authority     Psychiatric problem     Psychosis 9/26/2019    Renal disorder     Seizures     Self-harming behavior     Suicide attempt     Therapy     Thrombocytopenia 6/15/2015       Social History     Socioeconomic History    Marital status: Single   Occupational History    Occupation: Disabled     Employer: DISABLED   Tobacco  Use    Smoking status: Never    Smokeless tobacco: Never   Substance and Sexual Activity    Alcohol use: Not Currently     Comment: hx of ETOH abuse with cirrhosis of liver    Drug use: No    Sexual activity: Not Currently   Other Topics Concern    Patient feels they ought to cut down on drinking/drug use No    Patient annoyed by others criticizing their drinking/drug use No    Patient has felt bad or guilty about drinking/drug use No    Patient has had a drink/used drugs as an eye opener in the AM No   Social History Narrative    Pt has 1 older and 1 younger sister.  Her father committed suicide when she was 18.  She has a [employer] degree and worked as an , but she has been disabled since age 40.  She never  and has no children.  She is not dating and claims no current friends.  She currently lives with her mother along with her pet dog.  She denies any hobbies and says she is not Baptism.     Social Determinants of Health     Financial Resource Strain: Low Risk  (8/15/2023)    Overall Financial Resource Strain (CARDIA)     Difficulty of Paying Living Expenses: Not hard at all   Food Insecurity: No Food Insecurity (8/15/2023)    Hunger Vital Sign     Worried About Running Out of Food in the Last Year: Never true     Ran Out of Food in the Last Year: Never true   Transportation Needs: No Transportation Needs (8/15/2023)    PRAPARE - Transportation     Lack of Transportation (Medical): No     Lack of Transportation (Non-Medical): No   Physical Activity: Inactive (8/15/2023)    Exercise Vital Sign     Days of Exercise per Week: 0 days     Minutes of Exercise per Session: 0 min   Stress: Stress Concern Present (8/15/2023)    [employer] [place] [employer] - Occupational Stress Questionnaire     Feeling of Stress : Very much   Social Connections: Unknown (8/15/2023)    Social Connection and Isolation Panel [NHANES]     Frequency of Communication with Friends and Family: Patient refused     Frequency  of Social Gatherings with Friends and Family: Patient refused     Attends Adventism Services: Never     Active Member of Clubs or Organizations: No     Attends Club or Organization Meetings: Never     Marital Status: Never    Housing Stability: Low Risk  (8/15/2023)    Housing Stability Vital Sign     Unable to Pay for Housing in the Last Year: No     Number of Places Lived in the Last Year: 1     Unstable Housing in the Last Year: No       Precautions:     Allergies as of 11/29/2023 - Reviewed 11/29/2023   Allergen Reaction Noted    Sulfa (sulfonamide antibiotics) Rash 11/26/2014    Codeine Nausea And Vomiting 04/26/2018       [facility] Assessment Details/Treatment   Patient consult for wound care to sacral area with 100% redness without any skin opening. Patient admitted into facility with sacral skin issues. Patient able to reposition herself while in bed, but need reminder to turn herself. A waffle mattress ordered, patient have no problem lying on her side, she always lying in fetal position from one side or another. The treatment to the sacral area to cleanse with soap and water pat dry apply a foam sacral border, the sacral sacral border change done every two days and prn for saturation of urine or stool.    Recommendations made to primary team for  the above  Orders placed.     11/30/2023

## 2023-11-30 NOTE — PROGRESS NOTES
[facility] - Med Surg  Endocrinology  Progress Note    Admit Date: 11/29/2023     Marely Hamilton is a 58-year-old female with a reported medical history of EtOH cirrhosis, bipolar disorder, anemia, thrombocytopenia, cholelithiasis, DVT with IVC filter in place, GI bleed, ESBL UTI history and seizure disorder.  She was sent from her nursing facility after a fall with altered mentation, cough and urinary frequency.  Difficult historian and she is alert but not oriented on initial assessment.  Laboratory workup included elevated calcium levels and PTH also elevated.  Known history of bipolar disorder with use of Lithium.  Has had prior labs concerning for a pattern of primary hyperparathyroidism in the past.  Never evaluated by endocrinology.  She denies history of fractures or kidney stones.  Also denies ever being told of elevated calcium levels or abnormal parathyroid function in the past.      Regarding Hypercalcemia and/or Primary Hyperparathyroidism:    - Notable for albumin corrected calcium elevations on labs intermittently since 2015  - PTH has been inappropriately normal or mildly elevated since at least 2020    She had similar labs on admission in May 2023 with calcium acutely elevated and at that time Reclast and calcitonin were given with improvement in serum calcium and actually a slightly low serum calcium when corrected for albumin in the days/weeks following that.  Thoughts were that dehydration and supra-therapeutic lithium levels were the cause.  She was hydrated on that admission and plans were to remove lithium and pursue other therapies with psychiatry.       Lab Results   Component Value Date    .1 (H) 11/29/2023    PTH 84.1 (H) 05/18/2023    PTH 41.0 06/03/2020    CALCIUM 14.2 (HH) 11/29/2023    CALCIUM 8.5 (L) 10/18/2023    CALCIUM 8.7 10/17/2023    ALBUMIN 2.9 (L) 11/29/2023    ALBUMIN 2.2 (L) 10/18/2023    ALBUMIN 2.1 (L) 10/17/2023    PHOS 3.0 10/13/2023    PHOS 2.2 (L) 09/17/2023     PHOS 2.0 (L) 08/17/2023    ALKPHOS 232 (H) 11/29/2023    TSH 0.530 11/29/2023    [id number] 11 (L) 11/29/2023    ESTGFRAFRICA SEE COMMENT 06/03/2023    EGFRNONAA SEE COMMENT 06/03/2023     - In addition to above labs, lithium level also noted to be elevated on admission    - Daily intake of calcium:  None  - Daily intake of vitamin D: None    - Taking lithium or hydrochlorothiazide: yes     - Most recent DEXA:  2015  IMPRESSION:    Low bone mass. The estimated 10 year probability of hip fracture is 0.4% and of a major osteoporotic fracture 3.5%, respectively, using FRAX.    - If PHPT suspected, surgical indications:    No   Yes  []    [x]  Nephrolithiasis  (CTA from 7/2023 showed possible kidney stones versus calcifications)  []    []  Hypercalcuria (never completed)  []    [x]  Impaired renal function (GFR less than 60 mL/minute)  [x]    []  Osteoporosis (BDS less than -2.5, fragility fracture, or FRAX [>20% or >3% respectively])  []    [x]  Serum calcium level greater than or equal to 11.5      Likely not a surgical candidate despite the above given her co-morbidities and such    - Current symptoms:  Altered mentation, depression hx (on lithium), elevated lipase (no abdominal pain currently).       Interval HPI:   Overnight events: No acute events noted from overnight.  Calcium has been down trending with IV fluids and calcitonin dosing x 2.        Labs Reviewed and Include:  Lab Results   Component Value Date    WBC 9.19 11/29/2023    HGB 14.5 11/29/2023    HCT 44.2 11/29/2023     (H) 11/29/2023     (L) 11/29/2023       Recent Labs   Lab 11/29/23  0329   GLU 99   CALCIUM 14.2*   ALBUMIN 2.9*   PROT 7.2   *   K 4.9   CO2 25   *   BUN 37*   CREATININE 1.4   ALKPHOS 232*   *   *   BILITOT 4.8*     Lab Results   Component Value Date    HGBA1C <4.0 (A) 05/29/2023       Nutritional status:   Body mass index is 16.33 kg/m².  Lab Results   Component Value Date    ALBUMIN 2.9 (L)  "11/29/2023    ALBUMIN 2.2 (L) 10/18/2023    ALBUMIN 2.1 (L) 10/17/2023     No results found for: "PREALBUMIN"    Estimated Creatinine Clearance: 28.3 mL/min (based on SCr of 1.4 mg/dL).      PHYSICAL EXAMINATION:  Vitals:    11/29/23 0900   BP: 118/61   Pulse: 76   Resp: 14   Temp:      Body mass index is 16.33 kg/m².     Physical Exam  Vitals reviewed.   Constitutional:       Appearance: She is ill-appearing.   Eyes:      Extraocular Movements: Extraocular movements intact.   Cardiovascular:      Rate and Rhythm: Normal rate.      Pulses: Normal pulses.   Pulmonary:      Effort: Pulmonary effort is normal.   Abdominal:      General: Abdomen is flat.      Palpations: Abdomen is soft.   Musculoskeletal:      Comments: Thin appearing.     Neurological:      Mental Status: She is alert. She is disoriented.            ASSESSMENT and PLAN    Psychiatric  Bipolar 1 disorder, depressed, severe  Long standing Bipolar disorder.  Lithium has been in use since at least 2015 and since that time serum calcium levels have intermittently been 10 or higher.  Now with supra-therapeutic lithium level this admission.  Possibility of 4 gland parathyroid hyperplasia from long standing lithium use.        Renal/  Hypercalcemia  Review of labs show a pattern have shown prior concerns for elevated or normal PTH in the setting of elevated serum calcium.  Likely primary hyperparathyroidism as a factor but also use of chronic lithium can play a small role as well.  Dehydration suspected with her acute presentation and recent reported increase urinary frequency.     Therapy given so far has included 2 liters LR bolus and calcitonin x 2  Continue IV NS since admission    Serum calcium down trending, now 11.7 when corrected for albumin    Recommendations:  - Hydration as tolerated, Now IV NS at 150 ml/hr.  Can use PRN Lasix or for volume overload (goal urine output would be 100-150 ml/hr).   - Agree with calcitonin use in the first 48 hours  - " Can consider Bisphosphonate therapy if worsening, for now can hold.   - Monitor with renal function panel every 8-12 hours for now  - Given low Vitamin D, started 2000 IU cholecalciferol on 11/29/23      Endocrine  Hyperparathyroidism  Labs have showed a pattern consistent with primary hyperparathyroidism since 2020.  PTH again elevated in setting of hypercalcemia.  Lithium can be playing a role in calcium and PTH elevations but with degree of hypercalcemia dehydration suspected.  Based on degree of hypercalcemia, renal dysfunction and possible history of kidney stones surgical intervention can be considered if medical management and hydration is not successful.  Should have repeat DXA performed outpatient to evaluate for underlying osteoporosis with inclusion of distal radius.  She refused neck ultrasound this admission to evaluate for parathyroid adenomas but also is unlikely to be a surgical candidate should an adenoma be found.  May be able to consider Sensipar if not stable with hydration alone.        Severe protein-calorie malnutrition  Low BMI with dehydration likely playing a role in current hypercalcemia.  Hydration ongoing.          Eriberto Cesar, DO  Endocrinology

## 2023-11-30 NOTE — NURSING
Pt received from, alert, disoriented to place, time, situation. Due medication taken orally, pills crushed, thin liquids taken without signs of aspiration.

## 2023-11-30 NOTE — ASSESSMENT & PLAN NOTE
Malnutrition Type:  Context: chronic illness  Level: severe    Related to (etiology):   Decreased ability to consume sufficient energy    Signs and Symptoms (as evidenced by):   AMS     Malnutrition Characteristic Summary:  Weight Loss (Malnutrition): other (see comments) (29% x 1 month)  Energy Intake (Malnutrition): less than 75% for greater than or equal to 3 months  Subcutaneous Fat (Malnutrition): severe depletion  Muscle Mass (Malnutrition): severe depletion      Interventions/Recommendations (treatment strategy):  1. Recommend liberalizing diet to Regular for added variety to likely increase PO intake, fluid per MD. 2. Recommend Boost Plus/VHC with all meals for additional calories/PRO. 3. Recommend MVI + B-complex vitamin supplementation. 4. Recommend daily weights for accuracy. 5. RD to monitor and follow-up.    Collaboration of nutrition care with other providers     Nutrition Diagnosis Status:   New

## 2023-11-30 NOTE — ASSESSMENT & PLAN NOTE
Acute cystitis  Hypercalcemia    Patient presents from NH with acute mentation change in the setting of hypercalcemia and UTI.  - continue ceftriaxone  - follow cultures

## 2023-11-30 NOTE — SUBJECTIVE & OBJECTIVE
Interval History: No acute events. Oriented only to self. Able to answer a few simple questions this am which is reportedly improvement in mental status.   Urine culture pending  Ca improving    Review of Systems  Objective:     Vital Signs (Most Recent):  Temp: 97.3 °F (36.3 °C) (11/30/23 1226)  Pulse: 76 (11/30/23 1226)  Resp: 16 (11/30/23 1226)  BP: (!) 110/56 (11/30/23 1226)  SpO2: 96 % (11/30/23 1226) Vital Signs (24h Range):  Temp:  [96.1 °F (35.6 °C)-98.5 °F (36.9 °C)] 97.3 °F (36.3 °C)  Pulse:  [64-76] 76  Resp:  [16-20] 16  SpO2:  [93 %-98 %] 96 %  BP: (110-131)/(56-65) 110/56     Weight: 58.5 kg (129 lb)  Body mass index is 23.59 kg/m².    Intake/Output Summary (Last 24 hours) at 11/30/2023 1438  Last data filed at 11/30/2023 1226  Gross per 24 hour   Intake 360 ml   Output 702 ml   Net -342 ml         Physical Exam  Vitals and nursing note reviewed.   Constitutional:       General: She is sleeping. She is not in acute distress.     Appearance: She is cachectic.   HENT:      Head: Normocephalic and atraumatic.      Mouth/Throat:      Mouth: Mucous membranes are dry.      Pharynx: No oropharyngeal exudate.   Eyes:      Conjunctiva/sclera: Conjunctivae normal.      Pupils: Pupils are equal, round, and reactive to light.   Cardiovascular:      Rate and Rhythm: Normal rate and regular rhythm.      Heart sounds: Normal heart sounds.   Pulmonary:      Effort: Pulmonary effort is normal. No respiratory distress.      Breath sounds: Normal breath sounds. No wheezing.   Abdominal:      General: Bowel sounds are normal. There is no distension.      Palpations: Abdomen is soft.      Tenderness: There is no abdominal tenderness.   Musculoskeletal:         General: No tenderness. Normal range of motion.      Cervical back: Normal range of motion and neck supple.   Lymphadenopathy:      Cervical: No cervical adenopathy.   Skin:     General: Skin is warm and dry.      Capillary Refill: Capillary refill takes less than 2  seconds.      Findings: Ecchymosis present. No rash.   Neurological:      Mental Status: She is disoriented.      Comments: Poor participation             Significant Labs: All pertinent labs within the past 24 hours have been reviewed.    Significant Imaging: I have reviewed all pertinent imaging results/findings within the past 24 hours.

## 2023-11-30 NOTE — PLAN OF CARE
Problem: Violence Risk or Actual  Goal: Anger and Impulse Control  Outcome: Ongoing, Progressing  Intervention: Minimize Safety Risk  Flowsheets (Taken 11/30/2023 0239)  Sensory Stimulation Regulation:   quiet environment promoted   lighting decreased  Enhanced Safety Measures: bed alarm set     Problem: Adult Inpatient Plan of Care  Goal: Plan of Care Review  Outcome: Ongoing, Progressing  Flowsheets (Taken 11/30/2023 0239)  Plan of Care Reviewed With: patient  Goal: Patient-Specific Goal (Individualized)  Outcome: Ongoing, Progressing  Goal: Optimal Comfort and Wellbeing  Outcome: Ongoing, Progressing  Intervention: Monitor Pain and Promote Comfort  Flowsheets (Taken 11/30/2023 0239)  Pain Management Interventions: around-the-clock dosing utilized     Problem: Impaired Wound Healing  Goal: Optimal Wound Healing  Outcome: Ongoing, Progressing  Intervention: Promote Wound Healing  Flowsheets (Taken 11/30/2023 0239)  Pain Management Interventions: around-the-clock dosing utilized     Problem: Impaired Wound Healing  Goal: Optimal Wound Healing  Intervention: Promote Wound Healing  Flowsheets (Taken 11/30/2023 0239)  Pain Management Interventions: around-the-clock dosing utilized     Problem: Skin Injury Risk Increased  Goal: Skin Health and Integrity  Outcome: Ongoing, Progressing  Intervention: Optimize Skin Protection  Flowsheets (Taken 11/30/2023 0239)  Head of Bed (HOB) Positioning: HOB elevated

## 2023-11-30 NOTE — ED NOTES
Pt placed on telemetry box [id number] . Rhythm and rate confirmed by telemetry technician: NSR at a rate of 64

## 2023-12-01 LAB
ALBUMIN SERPL BCP-MCNC: 2.1 G/DL (ref 3.5–5.2)
ALBUMIN SERPL BCP-MCNC: 2.2 G/DL (ref 3.5–5.2)
ALBUMIN SERPL BCP-MCNC: 2.2 G/DL (ref 3.5–5.2)
ALBUMIN SERPL BCP-MCNC: 2.3 G/DL (ref 3.5–5.2)
ALBUMIN SERPL BCP-MCNC: 2.4 G/DL (ref 3.5–5.2)
ALBUMIN SERPL BCP-MCNC: 2.4 G/DL (ref 3.5–5.2)
ALP SERPL-CCNC: 177 U/L (ref 55–135)
ALP SERPL-CCNC: 181 U/L (ref 55–135)
ALP SERPL-CCNC: 186 U/L (ref 55–135)
ALP SERPL-CCNC: 193 U/L (ref 55–135)
ALT SERPL W/O P-5'-P-CCNC: 102 U/L (ref 10–44)
ALT SERPL W/O P-5'-P-CCNC: 107 U/L (ref 10–44)
ALT SERPL W/O P-5'-P-CCNC: 108 U/L (ref 10–44)
ALT SERPL W/O P-5'-P-CCNC: 95 U/L (ref 10–44)
ANION GAP SERPL CALC-SCNC: 5 MMOL/L (ref 8–16)
ANION GAP SERPL CALC-SCNC: 7 MMOL/L (ref 8–16)
ANION GAP SERPL CALC-SCNC: 7 MMOL/L (ref 8–16)
ANION GAP SERPL CALC-SCNC: 9 MMOL/L (ref 8–16)
AST SERPL-CCNC: 146 U/L (ref 10–40)
AST SERPL-CCNC: 161 U/L (ref 10–40)
AST SERPL-CCNC: 171 U/L (ref 10–40)
AST SERPL-CCNC: 192 U/L (ref 10–40)
BACTERIA UR CULT: NORMAL
BACTERIA UR CULT: NORMAL
BASOPHILS # BLD AUTO: 0 K/UL (ref 0–0.2)
BASOPHILS NFR BLD: 0 % (ref 0–1.9)
BILIRUB SERPL-MCNC: 3.8 MG/DL (ref 0.1–1)
BILIRUB SERPL-MCNC: 4.4 MG/DL (ref 0.1–1)
BILIRUB SERPL-MCNC: 4.8 MG/DL (ref 0.1–1)
BILIRUB SERPL-MCNC: 5.4 MG/DL (ref 0.1–1)
BUN SERPL-MCNC: 11 MG/DL (ref 6–20)
BUN SERPL-MCNC: 11 MG/DL (ref 6–20)
BUN SERPL-MCNC: 12 MG/DL (ref 6–20)
BUN SERPL-MCNC: 13 MG/DL (ref 6–20)
BUN SERPL-MCNC: 13 MG/DL (ref 6–20)
BUN SERPL-MCNC: 8 MG/DL (ref 6–20)
CALCIUM SERPL-MCNC: 10.1 MG/DL (ref 8.7–10.5)
CALCIUM SERPL-MCNC: 10.5 MG/DL (ref 8.7–10.5)
CALCIUM SERPL-MCNC: 8.3 MG/DL (ref 8.7–10.5)
CALCIUM SERPL-MCNC: 9.5 MG/DL (ref 8.7–10.5)
CALCIUM SERPL-MCNC: 9.8 MG/DL (ref 8.7–10.5)
CALCIUM SERPL-MCNC: 9.8 MG/DL (ref 8.7–10.5)
CHLORIDE SERPL-SCNC: 115 MMOL/L (ref 95–110)
CHLORIDE SERPL-SCNC: 123 MMOL/L (ref 95–110)
CHLORIDE SERPL-SCNC: 126 MMOL/L (ref 95–110)
CHLORIDE SERPL-SCNC: 126 MMOL/L (ref 95–110)
CHLORIDE SERPL-SCNC: 127 MMOL/L (ref 95–110)
CHLORIDE SERPL-SCNC: 128 MMOL/L (ref 95–110)
CO2 SERPL-SCNC: 13 MMOL/L (ref 23–29)
CO2 SERPL-SCNC: 19 MMOL/L (ref 23–29)
CO2 SERPL-SCNC: 19 MMOL/L (ref 23–29)
CO2 SERPL-SCNC: 20 MMOL/L (ref 23–29)
CO2 SERPL-SCNC: 20 MMOL/L (ref 23–29)
CO2 SERPL-SCNC: 21 MMOL/L (ref 23–29)
CREAT SERPL-MCNC: 0.7 MG/DL (ref 0.5–1.4)
CREAT SERPL-MCNC: 0.8 MG/DL (ref 0.5–1.4)
DIFFERENTIAL METHOD: ABNORMAL
EOSINOPHIL # BLD AUTO: 0.1 K/UL (ref 0–0.5)
EOSINOPHIL NFR BLD: 1.1 % (ref 0–8)
ERYTHROCYTE [DISTWIDTH] IN BLOOD BY AUTOMATED COUNT: 14.7 % (ref 11.5–14.5)
EST. GFR  (NO RACE VARIABLE): >60 ML/MIN/1.73 M^2
GLUCOSE SERPL-MCNC: 118 MG/DL (ref 70–110)
GLUCOSE SERPL-MCNC: 127 MG/DL (ref 70–110)
GLUCOSE SERPL-MCNC: 139 MG/DL (ref 70–110)
GLUCOSE SERPL-MCNC: 139 MG/DL (ref 70–110)
GLUCOSE SERPL-MCNC: 145 MG/DL (ref 70–110)
GLUCOSE SERPL-MCNC: 175 MG/DL (ref 70–110)
HCT VFR BLD AUTO: 34.7 % (ref 37–48.5)
HGB BLD-MCNC: 11.3 G/DL (ref 12–16)
IMM GRANULOCYTES # BLD AUTO: 0.01 K/UL (ref 0–0.04)
IMM GRANULOCYTES NFR BLD AUTO: 0.2 % (ref 0–0.5)
LYMPHOCYTES # BLD AUTO: 0.4 K/UL (ref 1–4.8)
LYMPHOCYTES NFR BLD: 8.2 % (ref 18–48)
MAGNESIUM SERPL-MCNC: 1.3 MG/DL (ref 1.6–2.6)
MAGNESIUM SERPL-MCNC: 2.1 MG/DL (ref 1.6–2.6)
MCH RBC QN AUTO: 36.5 PG (ref 27–31)
MCHC RBC AUTO-ENTMCNC: 32.6 G/DL (ref 32–36)
MCV RBC AUTO: 112 FL (ref 82–98)
MONOCYTES # BLD AUTO: 0.4 K/UL (ref 0.3–1)
MONOCYTES NFR BLD: 9 % (ref 4–15)
NEUTROPHILS # BLD AUTO: 3.8 K/UL (ref 1.8–7.7)
NEUTROPHILS NFR BLD: 81.5 % (ref 38–73)
NRBC BLD-RTO: 0 /100 WBC
PHOSPHATE SERPL-MCNC: 1.2 MG/DL (ref 2.7–4.5)
PHOSPHATE SERPL-MCNC: 1.8 MG/DL (ref 2.7–4.5)
PHOSPHATE SERPL-MCNC: 2.2 MG/DL (ref 2.7–4.5)
PHOSPHATE SERPL-MCNC: 2.3 MG/DL (ref 2.7–4.5)
PLATELET # BLD AUTO: 57 K/UL (ref 150–450)
PMV BLD AUTO: 9.6 FL (ref 9.2–12.9)
POTASSIUM SERPL-SCNC: 3.2 MMOL/L (ref 3.5–5.1)
POTASSIUM SERPL-SCNC: 3.4 MMOL/L (ref 3.5–5.1)
POTASSIUM SERPL-SCNC: 3.6 MMOL/L (ref 3.5–5.1)
POTASSIUM SERPL-SCNC: 3.7 MMOL/L (ref 3.5–5.1)
PROT SERPL-MCNC: 5.4 G/DL (ref 6–8.4)
PROT SERPL-MCNC: 5.5 G/DL (ref 6–8.4)
PROT SERPL-MCNC: 5.6 G/DL (ref 6–8.4)
PROT SERPL-MCNC: 5.8 G/DL (ref 6–8.4)
RBC # BLD AUTO: 3.1 M/UL (ref 4–5.4)
SODIUM SERPL-SCNC: 137 MMOL/L (ref 136–145)
SODIUM SERPL-SCNC: 147 MMOL/L (ref 136–145)
SODIUM SERPL-SCNC: 151 MMOL/L (ref 136–145)
SODIUM SERPL-SCNC: 151 MMOL/L (ref 136–145)
SODIUM SERPL-SCNC: 154 MMOL/L (ref 136–145)
SODIUM SERPL-SCNC: 155 MMOL/L (ref 136–145)
WBC # BLD AUTO: 4.66 K/UL (ref 3.9–12.7)

## 2023-12-01 PROCEDURE — 99232 PR SUBSEQUENT HOSPITAL CARE,LEVL II: ICD-10-PCS | Mod: GC,,, | Performed by: INTERNAL MEDICINE

## 2023-12-01 PROCEDURE — 85025 COMPLETE CBC W/AUTO DIFF WBC: CPT | Performed by: PHYSICIAN ASSISTANT

## 2023-12-01 PROCEDURE — 25000003 PHARM REV CODE 250: Performed by: PHYSICIAN ASSISTANT

## 2023-12-01 PROCEDURE — 36415 COLL VENOUS BLD VENIPUNCTURE: CPT

## 2023-12-01 PROCEDURE — 83735 ASSAY OF MAGNESIUM: CPT | Mod: 91

## 2023-12-01 PROCEDURE — 25000003 PHARM REV CODE 250: Performed by: STUDENT IN AN ORGANIZED HEALTH CARE EDUCATION/TRAINING PROGRAM

## 2023-12-01 PROCEDURE — 80069 RENAL FUNCTION PANEL: CPT | Performed by: STUDENT IN AN ORGANIZED HEALTH CARE EDUCATION/TRAINING PROGRAM

## 2023-12-01 PROCEDURE — 84100 ASSAY OF PHOSPHORUS: CPT | Performed by: STUDENT IN AN ORGANIZED HEALTH CARE EDUCATION/TRAINING PROGRAM

## 2023-12-01 PROCEDURE — 63600175 PHARM REV CODE 636 W HCPCS: Performed by: STUDENT IN AN ORGANIZED HEALTH CARE EDUCATION/TRAINING PROGRAM

## 2023-12-01 PROCEDURE — 99232 SBSQ HOSP IP/OBS MODERATE 35: CPT | Mod: GC,,, | Performed by: INTERNAL MEDICINE

## 2023-12-01 PROCEDURE — 83735 ASSAY OF MAGNESIUM: CPT | Performed by: STUDENT IN AN ORGANIZED HEALTH CARE EDUCATION/TRAINING PROGRAM

## 2023-12-01 PROCEDURE — 84100 ASSAY OF PHOSPHORUS: CPT | Mod: 91

## 2023-12-01 PROCEDURE — 80053 COMPREHEN METABOLIC PANEL: CPT | Mod: 91

## 2023-12-01 PROCEDURE — 80069 RENAL FUNCTION PANEL: CPT | Mod: 91 | Performed by: STUDENT IN AN ORGANIZED HEALTH CARE EDUCATION/TRAINING PROGRAM

## 2023-12-01 PROCEDURE — 63600175 PHARM REV CODE 636 W HCPCS: Performed by: PHYSICIAN ASSISTANT

## 2023-12-01 PROCEDURE — 36415 COLL VENOUS BLD VENIPUNCTURE: CPT | Performed by: STUDENT IN AN ORGANIZED HEALTH CARE EDUCATION/TRAINING PROGRAM

## 2023-12-01 PROCEDURE — 21400001 HC TELEMETRY ROOM

## 2023-12-01 PROCEDURE — 80053 COMPREHEN METABOLIC PANEL: CPT | Performed by: PHYSICIAN ASSISTANT

## 2023-12-01 RX ORDER — SODIUM CHLORIDE 450 MG/100ML
INJECTION, SOLUTION INTRAVENOUS CONTINUOUS
Status: DISCONTINUED | OUTPATIENT
Start: 2023-12-01 | End: 2023-12-02

## 2023-12-01 RX ORDER — MAGNESIUM SULFATE HEPTAHYDRATE 40 MG/ML
2 INJECTION, SOLUTION INTRAVENOUS ONCE
Status: COMPLETED | OUTPATIENT
Start: 2023-12-01 | End: 2023-12-01

## 2023-12-01 RX ADMIN — CINACALCET 30 MG: 30 TABLET, FILM COATED ORAL at 09:12

## 2023-12-01 RX ADMIN — MAGNESIUM SULFATE HEPTAHYDRATE 2 G: 40 INJECTION, SOLUTION INTRAVENOUS at 09:12

## 2023-12-01 RX ADMIN — CINACALCET 30 MG: 30 TABLET, FILM COATED ORAL at 06:12

## 2023-12-01 RX ADMIN — LACTULOSE 30 G: 20 SOLUTION ORAL at 09:12

## 2023-12-01 RX ADMIN — SODIUM CHLORIDE: 4.5 INJECTION, SOLUTION INTRAVENOUS at 11:12

## 2023-12-01 RX ADMIN — PANTOPRAZOLE SODIUM 40 MG: 40 GRANULE, DELAYED RELEASE ORAL at 09:12

## 2023-12-01 RX ADMIN — LEVETIRACETAM 1000 MG: 500 SOLUTION ORAL at 09:12

## 2023-12-01 RX ADMIN — LACTULOSE 30 G: 20 SOLUTION ORAL at 02:12

## 2023-12-01 RX ADMIN — CEFTRIAXONE 1 G: 1 INJECTION, POWDER, FOR SOLUTION INTRAMUSCULAR; INTRAVENOUS at 06:12

## 2023-12-01 RX ADMIN — LITHIUM CARBONATE 600 MG: 300 TABLET, FILM COATED, EXTENDED RELEASE ORAL at 09:12

## 2023-12-01 RX ADMIN — CHOLECALCIFEROL TAB 25 MCG (1000 UNIT) 2000 UNITS: 25 TAB at 09:12

## 2023-12-01 RX ADMIN — RIFAXIMIN 550 MG: 550 TABLET ORAL at 09:12

## 2023-12-01 RX ADMIN — FERROUS SULFATE TAB EC 325 MG (65 MG FE EQUIVALENT) 1 EACH: 325 (65 FE) TABLET DELAYED RESPONSE at 09:12

## 2023-12-01 RX ADMIN — OLANZAPINE 5 MG: 2.5 TABLET, FILM COATED ORAL at 09:12

## 2023-12-01 NOTE — ASSESSMENT & PLAN NOTE
Patient has Abnormal Magnesium: hypomagnesemia. Will continue to monitor electrolytes closely. Will replace the affected electrolytes and repeat labs to be done after interventions completed. The patient's magnesium results have been reviewed and are listed below.  Recent Labs   Lab 12/01/23  1210   MG 2.1

## 2023-12-01 NOTE — ASSESSMENT & PLAN NOTE
Patient Education     Sinusitis (Antibiotic Treatment)    The sinuses are air-filled spaces within the bones of the face. They connect to the inside of the nose. Sinusitis is an inflammation of the tissue that lines the sinuses. Sinusitis can occur during a cold. It can also happen due to allergies to pollens and other particles in the air. Sinusitis can cause symptoms of sinus congestion and a feeling of fullness. A sinus infection causes fever, headache, and facial pain. There is often green or yellow fluid draining from the nose or into the back of the throat (post-nasal drip). You have been given antibiotics to treat this condition.  Home care    Take the full course of antibiotics as instructed. Do not stop taking them, even when you feel better.    Drink plenty of water, hot tea, and other liquids. This may help thin nasal mucus. It also may help your sinuses drain fluids.    Heat may help soothe painful areas of your face. Use a towel soaked in hot water. Or,  the shower and direct the warm spray onto your face. Using a vaporizer along with a menthol rub at night may also help soothe symptoms.     An expectorant with guaifenesin may help thin nasal mucus and help your sinuses drain fluids.    You can use an over-the-counter decongestant, unless a similar medicine was prescribed to you. Nasal sprays work the fastest. Use one that contains phenylephrine or oxymetazoline. First blow your nose gently. Then use the spray. Do not use these medicines more often than directed on the label. If you do, your symptoms may get worse. You may also take pills that contain pseudoephedrine. Don t use products that combine multiple medicines. This is because side effects may be increased. Read labels. You can also ask the pharmacist for help. (People with high blood pressure should not use decongestants. They can raise blood pressure.)    Over-the-counter antihistamines may help if allergies contributed to your  Patient has hypernatremia which is controlled. The hypernatremia is due to Dehydration. We will aim to correct the sodium by 8-10mEq in 24 hours. We will correct their hypernatremia with Select IV fluids: D5W/0.45 NaCl at a rate of 75 ml/hr. The patient's sodium results have been reviewed and are listed below.  Recent Labs   Lab 12/01/23  1210   *   Hold lasix and spironolactone   sinusitis.      Do not use nasal rinses or irrigation during an acute sinus infection, unless your healthcare provider tells you to. Rinsing may spread the infection to other areas in your sinuses.    Use acetaminophen or ibuprofen to control pain, unless another pain medicine was prescribed to you. If you have chronic liver or kidney disease or ever had a stomach ulcer, talk with your healthcare provider before using these medicines. (Aspirin should never be taken by anyone under age 18 who is ill with a fever. It may cause severe liver damage.)    Don't smoke. This can make symptoms worse.  Follow-up care  Follow up with your healthcare provider or our staff if you are better in 1 week.  When to seek medical advice  Call your healthcare provider if any of these occur:    Facial pain or headache that gets worse    Stiff neck    Unusual drowsiness or confusion    Swelling of your forehead or eyelids    Vision problems, such as blurred or double vision    Fever of 100.4 F (38 C) or higher, or as directed by your healthcare provider    Seizure    Breathing problems    Symptoms don't go away in 10 days  Prevention  Here are steps you can take to help prevent an infection:    Keep good hand washing habits.    Don t have close contact with people who have sore throats, colds, or other upper respiratory infections.    Don t smoke, and stay away from secondhand smoke.    Stay up to date with of your vaccines.  Date Last Reviewed: 11/1/2017 2000-2018 [employer]. [address] [place] [address], [place] [address]. All rights reserved. This information is not intended as a substitute for professional medical care. Always follow your healthcare professional's instructions.

## 2023-12-01 NOTE — PLAN OF CARE
APPOINTMENT:    Patient Appointment(s) scheduled with Viktor Ross MD  Wednesday Dec 6, 2023 hospital follow up at 10:30 am

## 2023-12-01 NOTE — ASSESSMENT & PLAN NOTE
Patient with known Cirrhosis. Co-morbidities are present and inclusive of hepatic encephalopathy and malnutrition.  MELD-Na score calculated; MELD 3.0: 22 at 12/1/2023 12:10 PM  MELD-Na: 19 at 12/1/2023 12:10 PM  Calculated from:  Serum Creatinine: 0.8 mg/dL (Using min of 1 mg/dL) at 12/1/2023 12:10 PM  Serum Sodium: 147 mmol/L (Using max of 137 mmol/L) at 12/1/2023 12:10 PM  Total Bilirubin: 4.4 mg/dL at 12/1/2023 12:10 PM  Serum Albumin: 2.3 g/dL at 12/1/2023 12:10 PM  INR(ratio): 1.9 at 11/29/2023  8:21 AM  Age at listing (hypothetical): 58 years  Sex: Female at 12/1/2023 12:10 PM      Continue chronic meds. Will avoid any hepatotoxic meds, and monitor CBC/CMP/INR for synthetic function.   Continue lactulose  Continue Rifaximin

## 2023-12-01 NOTE — CARE UPDATE
"RAPID RESPONSE NURSE CHART REVIEW        Chart Reviewed: 12/01/2023, 7:35 AM    MRN: [id number]  Bed: 640/640 A    Dx: Acute metabolic encephalopathy    Marely Hamilton has a past medical history of Addiction to drug, Alcohol abuse, Alcohol abuse, in remission, Anemia, Anxiety, Behavioral problem, Bipolar disorder, Bipolar disorder in remission, Cirrhosis, Laennec's, Depression, Encounter for blood transfusion, Epistaxis, Fatigue, Glaucoma, Hematuria, Hepatic encephalopathy, Hepatic enlargement, History of psychiatric care, History of psychiatric hospitalization, History of seizure, hx of intentional Tylenol overdose, psychiatric care, Macrocytic anemia, Jeana, Osteoarthritis, Other ascites, Psychiatric exam requested by authority, Psychiatric problem, Psychosis, Renal disorder, Seizures, Self-harming behavior, Suicide attempt, Therapy, and Thrombocytopenia.    Last VS: BP (!) 108/55 (BP Location: Left arm, Patient Position: Lying)   Pulse 90   Temp 97.7 °F (36.5 °C)   Resp 16   Ht 5' 2" (1.575 m)   Wt 58.5 kg (129 lb)   SpO2 95%   BMI 23.59 kg/m²     24H Vital Sign Range:  Temp:  [97.3 °F (36.3 °C)-98.7 °F (37.1 °C)]   Pulse:  [67-96]   Resp:  [16-18]   BP: (108-128)/(55-69)   SpO2:  [94 %-96 %]     Level of Consciousness (AVPU): responds to voice    Recent Labs     11/29/23  0329 11/30/23  0428 12/01/23  0209   WBC 9.19 5.04 4.66   HGB 14.5 10.0* 11.3*   HCT 44.2 31.2* 34.7*   * 53* 57*       Recent Labs     11/29/23  0329 11/29/23  1349 11/30/23  0428 11/30/23  0750 11/30/23  1918 12/01/23  0003 12/01/23  0209   *   < > 142   < > 144 155* 154*   K 4.9   < > 3.7   < > 3.3* 3.7 3.6   *   < > 119*   < > 121* 127* 128*   CO2 25   < > 19*   < > 16* 21* 19*   BUN 37*   < > 23*   < > 15 12 11   CREATININE 1.4   < > 0.7   < > 0.7 0.7 0.7   GLU 99   < > 89   < > 150* 118* 127*   PHOS  --    < > 2.4*   < > 2.0* 2.3* 2.2*   MG 2.4  --  1.6  --   --   --  1.3*    < > = values in this interval not " "displayed.        No results for input(s): "PH", "PCO2", "PO2", "HCO3", "POCSATURATED", "BE" in the last 72 hours.     OXYGEN:  Flow (L/min): 2          MEWS score: 1    MD Chatterjee  contacted for K 3.6, Mg 1.3, and phos 2.2. Pt to receive lasix and lactulose today. Reports will order electrolyte replacements. No additional concerns verbalized at this time. Instructed to call [facility] for further concerns or assistance.    Martha Che RN        "

## 2023-12-01 NOTE — CARE UPDATE
RAPID RESPONSE NURSE PROACTIVE ROUNDING NOTE       Time of Visit: 215    Admit Date: 2023  LOS: 2  Code Status: Full Code   Date of Visit: 2023  : 1965  Age: 58 y.o.  Sex: female  Race: White  Bed: 640/640 A:   MRN: [id number]  Was the patient discharged from an ICU this admission? No   Was the patient discharged from a PACU within last 24 hours? No   Did the patient receive conscious sedation/general anesthesia in last 24 hours? No  Was the patient in the ED within the past 24 hours? No  Was the patient on NIPPV within the past 24 hours? No   Attending Physician: Lavell Chatterjee*  Primary Service: [facility]   Time spent at the bedside: < 15 min    SITUATION    Notified by NIKKI.  Reason for alert: acute lab abnormalities  Called to evaluate the patient for Circulatory    BACKGROUND     Why is the patient in the hospital?: Acute metabolic encephalopathy    Patient has a past medical history of Addiction to drug, Alcohol abuse, Alcohol abuse, in remission, Anemia, Anxiety, Behavioral problem, Bipolar disorder, Bipolar disorder in remission, Cirrhosis, Laennec's, Depression, Encounter for blood transfusion, Epistaxis, Fatigue, Glaucoma, Hematuria, Hepatic encephalopathy, Hepatic enlargement, History of psychiatric care, History of psychiatric hospitalization, History of seizure, hx of intentional Tylenol overdose, psychiatric care, Macrocytic anemia, Jeana, Osteoarthritis, Other ascites, Psychiatric exam requested by authority, Psychiatric problem, Psychosis, Renal disorder, Seizures, Self-harming behavior, Suicide attempt, Therapy, and Thrombocytopenia.    Last Vitals:  Temp: 98.7 °F (37.1 °C) (358)  Pulse: 89 (358)  Resp: 18 (151)  BP: 128/61 (358)  SpO2: 96 % (358)    24 Hours Vitals Range:  Temp:  [97.3 °F (36.3 °C)-98.7 °F (37.1 °C)]   Pulse:  [67-96]   Resp:  [16-18]   BP: (110-128)/(56-69)   SpO2:  [94 %-96 %]     Labs:  Recent Labs      "11/29/23  0329 11/30/23  0428 12/01/23  0209   WBC 9.19 5.04 4.66   HGB 14.5 10.0* 11.3*   HCT 44.2 31.2* 34.7*   * 53* 57*       Recent Labs     11/29/23  0329 11/29/23  1349 11/30/23  0428 11/30/23  0750 11/30/23  1918 12/01/23  0003 12/01/23  0209   *   < > 142   < > 144 155* 154*   K 4.9   < > 3.7   < > 3.3* 3.7 3.6   *   < > 119*   < > 121* 127* 128*   CO2 25   < > 19*   < > 16* 21* 19*   BUN 37*   < > 23*   < > 15 12 11   CREATININE 1.4   < > 0.7   < > 0.7 0.7 0.7   GLU 99   < > 89   < > 150* 118* 127*   PHOS  --    < > 2.4*   < > 2.0* 2.3* 2.2*   MG 2.4  --  1.6  --   --   --  1.3*    < > = values in this interval not displayed.        No results for input(s): "PH", "PCO2", "PO2", "HCO3", "POCSATURATED", "BE" in the last 72 hours.     ASSESSMENT    Physical Exam  Constitutional:       Appearance: She is cachectic. She is ill-appearing.   Cardiovascular:      Rate and Rhythm: Normal rate.      Pulses: Normal pulses.   Pulmonary:      Effort: Pulmonary effort is normal.      Breath sounds: Normal breath sounds.   Skin:     General: Skin is cool and dry.      Capillary Refill: Capillary refill takes less than 2 seconds.      Coloration: Skin is jaundiced and pale.      Findings: Bruising present.   Neurological:      Mental Status: She is lethargic and disoriented.      GCS: GCS eye subscore is 3. GCS verbal subscore is 4. GCS motor subscore is 6.         Notified by FABIOLA PLUNKETT of patient's acute lab abnormalities, Na 155 and Cl 127. At this point suspect contamination d/t patient having NS 0.9% infusing at 150cc/hr, fluids paused for now, labs just redrawn at 0209 and sent     INTERVENTIONS    The patient was seen for Medical problem. Staff concerns included critical lab abnormality. The following interventions were performed: continuous pulse ox monitoring continued and STAT CMP, CBC, Mag drawn and sent.    RECOMMENDATIONS    Follow up with lab results  Leave fluids paused until results come " back  Continue to monitor VS, neuro status closely    PROVIDER ESCALATION    Yes/No  Yes    Orders received and case discussed with  KIARRA Storm.    Disposition: Remain in room 640.    FOLLOW-UP    bedside RNMandy, charge RN Chema  updated on plan of care. Instructed to call the Rapid Response NurseLEXII, [place] at [phone] for additional questions or concerns.

## 2023-12-01 NOTE — ASSESSMENT & PLAN NOTE
Acute cystitis  Hypercalcemia  Hepatic encephalopathy  Patient presents from NH with acute mentation change in the setting of hypercalcemia and UTI.  - continue lactulose and rifaximin  - continue ceftriaxone  - follow cultures  - hyperCa management as below

## 2023-12-01 NOTE — PROGRESS NOTES
[facility] - Med Surg  Endocrinology  Progress Note    Admit Date: 11/29/2023     Marely Hamilton is a 58-year-old female with a reported medical history of EtOH cirrhosis, bipolar disorder, anemia, thrombocytopenia, cholelithiasis, DVT with IVC filter in place, GI bleed, ESBL UTI history and seizure disorder.  She was sent from her nursing facility after a fall with altered mentation, cough and urinary frequency.  Difficult historian and she is alert but not oriented on initial assessment.  Laboratory workup included elevated calcium levels and PTH also elevated.  Known history of bipolar disorder with use of Lithium.  Has had prior labs concerning for a pattern of primary hyperparathyroidism in the past.  Never evaluated by endocrinology.  She denies history of fractures or kidney stones.  Also denies ever being told of elevated calcium levels or abnormal parathyroid function in the past.      Regarding Hypercalcemia and/or Primary Hyperparathyroidism:    - Notable for albumin corrected calcium elevations on labs intermittently since 2015  - PTH has been inappropriately normal or mildly elevated since at least 2020    She had similar labs on admission in May 2023 with calcium acutely elevated and at that time Reclast and calcitonin were given with improvement in serum calcium and actually a slightly low serum calcium when corrected for albumin in the days/weeks following that.  Thoughts were that dehydration and supra-therapeutic lithium levels were the cause.  She was hydrated on that admission and plans were to remove lithium and pursue other therapies with psychiatry.       Lab Results   Component Value Date    .1 (H) 11/29/2023    PTH 84.1 (H) 05/18/2023    PTH 41.0 06/03/2020    CALCIUM 14.2 (HH) 11/29/2023    CALCIUM 8.5 (L) 10/18/2023    CALCIUM 8.7 10/17/2023    ALBUMIN 2.9 (L) 11/29/2023    ALBUMIN 2.2 (L) 10/18/2023    ALBUMIN 2.1 (L) 10/17/2023    PHOS 3.0 10/13/2023    PHOS 2.2 (L) 09/17/2023     "PHOS 2.0 (L) 08/17/2023    ALKPHOS 232 (H) 11/29/2023    TSH 0.530 11/29/2023    [id number] 11 (L) 11/29/2023    ESTGFRAFRICA SEE COMMENT 06/03/2023    EGFRNONAA SEE COMMENT 06/03/2023     - In addition to above labs, lithium level also noted to be elevated on admission    - Daily intake of calcium:  None  - Daily intake of vitamin D: None    - Taking lithium or hydrochlorothiazide: yes     - Most recent DEXA:  2015  IMPRESSION:    Low bone mass. The estimated 10 year probability of hip fracture is 0.4% and of a major osteoporotic fracture 3.5%, respectively, using FRAX.    - If PHPT suspected, surgical indications:    No   Yes  []    [x]  Nephrolithiasis  (CTA from 7/2023 showed possible kidney stones versus calcifications)  []    []  Hypercalcuria (never completed)  []    [x]  Impaired renal function (GFR less than 60 mL/minute)  [x]    []  Osteoporosis (BDS less than -2.5, fragility fracture, or FRAX [>20% or >3% respectively])  []    [x]  Serum calcium level greater than or equal to 11.5      Likely not a surgical candidate despite the above given her co-morbidities and such    - Current symptoms:  Altered mentation, depression hx (on lithium), elevated lipase (no abdominal pain currently).       Interval HPI:   Overnight events:  No acute events overnight but labs did show elevation in sodium and chloride.  IV fluids paused as a result.  Overall down trending in calcium seen.  Started on Cinacalcet in past 24 hours.  Remains confused on exam this morning.      BP (!) 108/55 (BP Location: Left arm, Patient Position: Lying)   Pulse 90   Temp 97.7 °F (36.5 °C)   Resp 16   Ht 5' 2" (1.575 m)   Wt 58.5 kg (129 lb)   SpO2 95%   BMI 23.59 kg/m²     Labs Reviewed and Include    Recent Labs   Lab 12/01/23  0209   *   CALCIUM 10.1   ALBUMIN 2.4*   PROT 5.8*   *   K 3.6   CO2 19*   *   BUN 11   CREATININE 0.7   ALKPHOS 193*   *   *   BILITOT 5.4*     Lab Results   Component Value " "Date    WBC 4.66 12/01/2023    HGB 11.3 (L) 12/01/2023    HCT 34.7 (L) 12/01/2023     (H) 12/01/2023    PLT 57 (L) 12/01/2023     Recent Labs   Lab 11/29/23  0329   TSH 0.530     Lab Results   Component Value Date    HGBA1C <4.0 (A) 05/29/2023       Nutritional status:   Body mass index is 23.59 kg/m².  Lab Results   Component Value Date    ALBUMIN 2.4 (L) 12/01/2023    ALBUMIN 2.4 (L) 12/01/2023    ALBUMIN 1.9 (L) 11/30/2023     No results found for: "PREALBUMIN"    Estimated Creatinine Clearance: 70.1 mL/min (based on SCr of 0.7 mg/dL).    Accu-Checks  Recent Labs     11/29/23 2104   POCTGLUCOSE 125*       Current Medications and/or Treatments Impacting Glycemic Control  Immunotherapy:    Immunosuppressants       None          Steroids:   Hormones (From admission, onward)      Start     Stop Route Frequency Ordered    11/29/23 0844  melatonin tablet 6 mg         -- Oral Nightly PRN 11/29/23 0746          Pressors:    Autonomic Drugs (From admission, onward)      None          Hyperglycemia/Diabetes Medications:   Antihyperglycemics (From admission, onward)      None            ASSESSMENT and PLAN    Psychiatric  Bipolar 1 disorder, depressed, severe  Long standing Bipolar disorder.  Lithium has been in use since at least 2015 and since that time serum calcium levels have intermittently been 10 or higher.  Now with supra-therapeutic lithium level this admission.  Possibility of 4 gland parathyroid hyperplasia from long standing lithium use.        Renal/  Hypercalcemia  Review of labs show a pattern have shown prior concerns for elevated or normal PTH in the setting of elevated serum calcium.  Likely primary hyperparathyroidism as a factor but also use of chronic lithium can play a small role as well.  Dehydration suspected with her acute presentation and recent reported increase urinary frequency.     Therapy given so far has included 2 liters LR bolus and doses of calcitonin  IV fluids with NS at first " but paused in past 24 hours due to worsening in Na and Cl    Serum calcium down trending, now 11.4 when corrected for albumin    Recommendations:  - Hydration as tolerated by mouth for now, can consider hypotonic fluids IV as well but given improvement may be able to do PO alone   - Given low Vitamin D, started 2000 IU cholecalciferol on 11/29/23  - Continue Cinacalcet 30 mg BID (started on 11/30/23)  - Monitor with renal function panel 1-2 times a day for now  - Can consider Bisphosphonate therapy if worsening, for now can hold.    Endocrine  Hyperparathyroidism  Labs have showed a pattern consistent with primary hyperparathyroidism since 2020.  PTH again elevated in setting of hypercalcemia.  Lithium can be playing a role in calcium and PTH elevations but with degree of hypercalcemia dehydration suspected.  Based on degree of hypercalcemia, renal dysfunction and possible history of kidney stones surgical intervention can be considered if medical management and hydration is not successful.  Should have repeat DXA performed outpatient to evaluate for underlying osteoporosis with inclusion of distal radius.  She refused neck ultrasound this admission to evaluate for parathyroid adenomas but also is unlikely to be a surgical candidate should an adenoma be found.      Severe protein-calorie malnutrition  Low BMI with dehydration likely playing a role in current hypercalcemia.  Also a component of immobility possible.  Hydration initially but now paused due to lab abnormalities.          Eriberto Cesar, DO  Endocrinology

## 2023-12-01 NOTE — ASSESSMENT & PLAN NOTE
Labs have showed a pattern consistent with primary hyperparathyroidism since 2020.  PTH again elevated in setting of hypercalcemia.  Lithium can be playing a role in calcium and PTH elevations but with degree of hypercalcemia dehydration suspected.  Based on degree of hypercalcemia, renal dysfunction and possible history of kidney stones surgical intervention can be considered if medical management and hydration is not successful.  Should have repeat DXA performed outpatient to evaluate for underlying osteoporosis with inclusion of distal radius.  She refused neck ultrasound this admission to evaluate for parathyroid adenomas but also is unlikely to be a surgical candidate should an adenoma be found.

## 2023-12-01 NOTE — RESPIRATORY THERAPY
RAPID RESPONSE RESPIRATORY CHART CHECK       Chart check completed. Please call [phone] for further concerns or assistance.

## 2023-12-01 NOTE — ASSESSMENT & PLAN NOTE
Review of labs show a pattern have shown prior concerns for elevated or normal PTH in the setting of elevated serum calcium.  Likely primary hyperparathyroidism as a factor but also use of chronic lithium can play a small role as well.  Dehydration suspected with her acute presentation and recent reported increase urinary frequency.     Therapy given so far has included 2 liters LR bolus and doses of calcitonin  IV fluids with NS at first but paused in past 24 hours due to worsening in Na and Cl    Serum calcium down trending, now 11.4 when corrected for albumin    Recommendations:  - Hydration as tolerated by mouth for now, can consider hypotonic fluids IV as well but given improvement may be able to do PO alone   - Given low Vitamin D, started 2000 IU cholecalciferol on 11/29/23  - Continue Cinacalcet 30 mg BID (started on 11/30/23)  - Monitor with renal function panel 1-2 times a day for now  - Can consider Bisphosphonate therapy if worsening, for now can hold.

## 2023-12-01 NOTE — PROGRESS NOTES
[facility] Medicine  Progress Note    Patient Name: Marely Hamilton  MRN: [id number]  Patient Class: IP- Inpatient   Admission Date: 11/29/2023  Length of Stay: 2 days  Attending Physician: Lavell Chatterjee*  Primary Care Provider: Viktor Ross MD        Subjective:     Principal Problem:Acute metabolic encephalopathy        HPI:  Patient is a 58-year-old woman with EtOH cirrhosis, bipolar disorder, anemia, thrombocytopenia, cholelithiasis, DVT with IVC filter in place, GI bleed, ESBL UTI history, seizure disorder, presenting from nursing [facility] with encephalopathy with unwitnessed fall. She was found down at her nursing home. Per chart review patient is normally A&O x3 1 month prior.  She does not take blood thinners. Patient denies any pain at this time. She reports she has been urinating frequently though difficult to ascertain history. She reports the last thing she remembers today before she arrived to the hospital was using the bathroom. She denies recent illness/fever/chills/nausea/vomiting/diarrhea/constipation.       Overview/Hospital Course:  In the ED, vitals stable. Intake labs remarkable for Na 146, BUN 37, Cr 1.4, Calcium 14.2 (corrected 15), , Tbili 4.8, , , Ammonia 51, lipase 69, lactic 2.3, UA with 67 WBCs. She was given 2L IVF, calcitonin, ceftriaxone and admitted for further management.    Interval History: Answering some questions appropriately but remains disoriented  Na up-trended yesterday 154  Started on hypotonic fluids  Hold lasix and spironolactone    Review of Systems  Objective:     Vital Signs (Most Recent):  Temp: 97 °F (36.1 °C) (12/01/23 1026)  Pulse: 72 (12/01/23 1108)  Resp: 16 (12/01/23 1026)  BP: 118/79 (12/01/23 1026)  SpO2: 95 % (12/01/23 1026) Vital Signs (24h Range):  Temp:  [97 °F (36.1 °C)-98.7 °F (37.1 °C)] 97 °F (36.1 °C)  Pulse:  [67-96] 72  Resp:  [16-18] 16  SpO2:  [94 %-96 %] 95 %  BP: (108-128)/(55-79) 118/79      Weight: 58.5 kg (129 lb)  Body mass index is 23.59 kg/m².    Intake/Output Summary (Last 24 hours) at 12/1/2023 1413  Last data filed at 12/1/2023 1344  Gross per 24 hour   Intake 4617.96 ml   Output 1151 ml   Net 3466.96 ml         Physical Exam  Vitals and nursing note reviewed.   Constitutional:       General: She is sleeping. She is not in acute distress.     Appearance: She is cachectic.   HENT:      Mouth/Throat:      Mouth: Mucous membranes are dry.   Cardiovascular:      Rate and Rhythm: Normal rate and regular rhythm.      Heart sounds: Normal heart sounds.   Pulmonary:      Effort: Pulmonary effort is normal. No respiratory distress.      Breath sounds: Normal breath sounds. No wheezing.   Abdominal:      General: Bowel sounds are normal. There is no distension.      Palpations: Abdomen is soft.      Tenderness: There is no abdominal tenderness.   Musculoskeletal:         General: No tenderness. Normal range of motion.      Cervical back: Normal range of motion and neck supple.   Lymphadenopathy:      Cervical: No cervical adenopathy.   Skin:     General: Skin is warm and dry.      Capillary Refill: Capillary refill takes less than 2 seconds.      Findings: Ecchymosis present. No rash.   Neurological:      Mental Status: She is disoriented.      Comments: Poor participation             Significant Labs: All pertinent labs within the past 24 hours have been reviewed.    Significant Imaging: I have reviewed all pertinent imaging results/findings within the past 24 hours.    Assessment/Plan:      * Acute metabolic encephalopathy  Acute cystitis  Hypercalcemia  Hepatic encephalopathy  Patient presents from NH with acute mentation change in the setting of hypercalcemia and UTI.  - continue lactulose and rifaximin  - continue ceftriaxone  - follow cultures  - hyperCa management as below      Body mass index (BMI) less than 19  - BMI 16.33  - dietary consulted      Fall  - CTH, XR pelvis unremarkable  - no  bony tenderness      Hypomagnesemia  Patient has Abnormal Magnesium: hypomagnesemia. Will continue to monitor electrolytes closely. Will replace the affected electrolytes and repeat labs to be done after interventions completed. The patient's magnesium results have been reviewed and are listed below.  Recent Labs   Lab 12/01/23  1210   MG 2.1        Presence of IVC filter  - history of bleeds  - not on AC      Hypercalcemia  The patient has hypercalcemia that is currently uncontrolled. The patient has the following symptoms due to their hypercalcemia: polydypsia and/or polyuria, weakness, and encephalopathy. The hypercalcemia is likely due to Hyperparathyroidism. We will obtain the following labs to work up the hypercalcemia: PTH   PTH, Intact   Date Value Ref Range Status   11/29/2023 115.1 (H) 9.0 - 77.0 pg/mL Final    . We will treat the hypercalcemia with: IV fluids and Calcitonin. Their latest calcium has been reviewed and is listed below.    Ionized Calcium   Date Value Ref Range Status   08/02/2023 0.90 (L) 1.06 - 1.42 mmol/L Final     - endocrine consulted and following    Hypernatremia  Patient has hypernatremia which is controlled. The hypernatremia is due to Dehydration. We will aim to correct the sodium by 8-10mEq in 24 hours. We will correct their hypernatremia with Select IV fluids: D5W/0.45 NaCl at a rate of 75 ml/hr. The patient's sodium results have been reviewed and are listed below.  Recent Labs   Lab 12/01/23  1210   *   Hold lasix and spironolactone    Bipolar 1 disorder, depressed, severe  - continue olanzapine 5mg qhs  - continue lithium CR 600mg qhs  - lithium level pending      Severe protein-calorie malnutrition  Nutrition consulted. Most recent weight and BMI monitored-     Measurements:  Wt Readings from Last 1 Encounters:   11/29/23 40.5 kg (89 lb 4.6 oz)   Body mass index is 16.33 kg/m².  - dietary consulted, history       Interventions/Recommendations (treatment strategy):   -  boost TID      Thrombocytopenia  Patient was found to have thrombocytopenia, the likely etiology is secondary to cirrhosis/portal hypertension, will monitor the platelets Daily. Will transfuse if platelet count is <10k. Hold DVT prophylaxis if platelets are <50k. The patient's platelet results have been reviewed and are listed below.  Recent Labs   Lab 11/29/23  0329   *         Cirrhosis, Capri's  Patient with known Cirrhosis. Co-morbidities are present and inclusive of hepatic encephalopathy and malnutrition.  MELD-Na score calculated; MELD 3.0: 22 at 12/1/2023 12:10 PM  MELD-Na: 19 at 12/1/2023 12:10 PM  Calculated from:  Serum Creatinine: 0.8 mg/dL (Using min of 1 mg/dL) at 12/1/2023 12:10 PM  Serum Sodium: 147 mmol/L (Using max of 137 mmol/L) at 12/1/2023 12:10 PM  Total Bilirubin: 4.4 mg/dL at 12/1/2023 12:10 PM  Serum Albumin: 2.3 g/dL at 12/1/2023 12:10 PM  INR(ratio): 1.9 at 11/29/2023  8:21 AM  Age at listing (hypothetical): 58 years  Sex: Female at 12/1/2023 12:10 PM      Continue chronic meds. Will avoid any hepatotoxic meds, and monitor CBC/CMP/INR for synthetic function.   Continue lactulose  Continue Rifaximin      VTE Risk Mitigation (From admission, onward)           Ordered     IP VTE HIGH RISK PATIENT  Once         11/29/23 0746                    Discharge Planning   BRAYAN: 12/1/2023     Code Status: Full Code   Is the patient medically ready for discharge?: No    Reason for patient still in hospital (select all that apply): Patient trending condition and Treatment  Discharge Plan A: Return to nursing home        Lavell Chatterjee MD  Department of Hospital Medicine   [facility]

## 2023-12-01 NOTE — ASSESSMENT & PLAN NOTE
Low BMI with dehydration likely playing a role in current hypercalcemia.  Also a component of immobility possible.  Hydration initially but now paused due to lab abnormalities.

## 2023-12-01 NOTE — SUBJECTIVE & OBJECTIVE
"Interval HPI:   Overnight events:  No acute events overnight but labs did show elevation in sodium and chloride.  IV fluids paused as a result.  Overall down trending in calcium seen.  Started on Cinacalcet in past 24 hours.      BP (!) 108/55 (BP Location: Left arm, Patient Position: Lying)   Pulse 90   Temp 97.7 °F (36.5 °C)   Resp 16   Ht 5' 2" (1.575 m)   Wt 58.5 kg (129 lb)   SpO2 95%   BMI 23.59 kg/m²     Labs Reviewed and Include    Recent Labs   Lab 12/01/23  0209   *   CALCIUM 10.1   ALBUMIN 2.4*   PROT 5.8*   *   K 3.6   CO2 19*   *   BUN 11   CREATININE 0.7   ALKPHOS 193*   *   *   BILITOT 5.4*     Lab Results   Component Value Date    WBC 4.66 12/01/2023    HGB 11.3 (L) 12/01/2023    HCT 34.7 (L) 12/01/2023     (H) 12/01/2023    PLT 57 (L) 12/01/2023     Recent Labs   Lab 11/29/23  0329   TSH 0.530     Lab Results   Component Value Date    HGBA1C <4.0 (A) 05/29/2023       Nutritional status:   Body mass index is 23.59 kg/m².  Lab Results   Component Value Date    ALBUMIN 2.4 (L) 12/01/2023    ALBUMIN 2.4 (L) 12/01/2023    ALBUMIN 1.9 (L) 11/30/2023     No results found for: "PREALBUMIN"    Estimated Creatinine Clearance: 70.1 mL/min (based on SCr of 0.7 mg/dL).    Accu-Checks  Recent Labs     11/29/23 2104   POCTGLUCOSE 125*       Current Medications and/or Treatments Impacting Glycemic Control  Immunotherapy:    Immunosuppressants       None          Steroids:   Hormones (From admission, onward)      Start     Stop Route Frequency Ordered    11/29/23 0844  melatonin tablet 6 mg         -- Oral Nightly PRN 11/29/23 0746          Pressors:    Autonomic Drugs (From admission, onward)      None          Hyperglycemia/Diabetes Medications:   Antihyperglycemics (From admission, onward)      None          "

## 2023-12-01 NOTE — SUBJECTIVE & OBJECTIVE
Interval History: Answering some questions appropriately but remains disoriented  Na up-trended yesterday 154  Started on hypotonic fluids  Hold lasix and spironolactone    Review of Systems  Objective:     Vital Signs (Most Recent):  Temp: 97 °F (36.1 °C) (12/01/23 1026)  Pulse: 72 (12/01/23 1108)  Resp: 16 (12/01/23 1026)  BP: 118/79 (12/01/23 1026)  SpO2: 95 % (12/01/23 1026) Vital Signs (24h Range):  Temp:  [97 °F (36.1 °C)-98.7 °F (37.1 °C)] 97 °F (36.1 °C)  Pulse:  [67-96] 72  Resp:  [16-18] 16  SpO2:  [94 %-96 %] 95 %  BP: (108-128)/(55-79) 118/79     Weight: 58.5 kg (129 lb)  Body mass index is 23.59 kg/m².    Intake/Output Summary (Last 24 hours) at 12/1/2023 1413  Last data filed at 12/1/2023 1344  Gross per 24 hour   Intake 4617.96 ml   Output 1151 ml   Net 3466.96 ml         Physical Exam  Vitals and nursing note reviewed.   Constitutional:       General: She is sleeping. She is not in acute distress.     Appearance: She is cachectic.   HENT:      Mouth/Throat:      Mouth: Mucous membranes are dry.   Cardiovascular:      Rate and Rhythm: Normal rate and regular rhythm.      Heart sounds: Normal heart sounds.   Pulmonary:      Effort: Pulmonary effort is normal. No respiratory distress.      Breath sounds: Normal breath sounds. No wheezing.   Abdominal:      General: Bowel sounds are normal. There is no distension.      Palpations: Abdomen is soft.      Tenderness: There is no abdominal tenderness.   Musculoskeletal:         General: No tenderness. Normal range of motion.      Cervical back: Normal range of motion and neck supple.   Lymphadenopathy:      Cervical: No cervical adenopathy.   Skin:     General: Skin is warm and dry.      Capillary Refill: Capillary refill takes less than 2 seconds.      Findings: Ecchymosis present. No rash.   Neurological:      Mental Status: She is disoriented.      Comments: Poor participation             Significant Labs: All pertinent labs within the past 24 hours have  been reviewed.    Significant Imaging: I have reviewed all pertinent imaging results/findings within the past 24 hours.

## 2023-12-01 NOTE — PLAN OF CARE
Problem: Violence Risk or Actual  Goal: Anger and Impulse Control  Outcome: Ongoing, Not Progressing     Problem: Adult Inpatient Plan of Care  Goal: Plan of Care Review  Outcome: Ongoing, Not Progressing  Goal: Patient-Specific Goal (Individualized)  Outcome: Ongoing, Not Progressing  Goal: Absence of Hospital-Acquired Illness or Injury  Outcome: Ongoing, Not Progressing  Goal: Optimal Comfort and Wellbeing  Outcome: Ongoing, Not Progressing  Goal: Readiness for Transition of Care  Outcome: Ongoing, Not Progressing     Problem: Infection  Goal: Absence of Infection Signs and Symptoms  Outcome: Ongoing, Not Progressing     Problem: Impaired Wound Healing  Goal: Optimal Wound Healing  Outcome: Ongoing, Not Progressing     Problem: Skin Injury Risk Increased  Goal: Skin Health and Integrity  Outcome: Ongoing, Not Progressing

## 2023-12-01 NOTE — PLAN OF CARE
Problem: Violence Risk or Actual  Goal: Anger and Impulse Control  Outcome: Ongoing, Progressing     Problem: Adult Inpatient Plan of Care  Goal: Plan of Care Review  Outcome: Ongoing, Progressing  Goal: Patient-Specific Goal (Individualized)  Outcome: Ongoing, Progressing  Goal: Absence of Hospital-Acquired Illness or Injury  Outcome: Ongoing, Progressing  Goal: Optimal Comfort and Wellbeing  Outcome: Ongoing, Progressing  Goal: Readiness for Transition of Care  Outcome: Ongoing, Progressing     Problem: Infection  Goal: Absence of Infection Signs and Symptoms  Outcome: Ongoing, Progressing     Problem: Impaired Wound Healing  Goal: Optimal Wound Healing  Outcome: Ongoing, Progressing     Problem: Skin Injury Risk Increased  Goal: Skin Health and Integrity  Outcome: Ongoing, Progressing     Patient rested comfortably with no significant changes during the shift, remains free from falls and injuries. Side rails up x2, bed low and locked, call light and personal belongings within reach. VS remain stable and respirations even and unlabored. Pt with frail appearance.  No grimace, cries or other signs of distress. Questions and concerns addressed and answered. Medication compliance. Pt remains on continuous cardiac monitoring. HOB and pillows adjusted for comfort. Pt repositioned and continues on waffle mattress. Heels elevated off bed. Continues on IV fluids. Reviewed importance of Safety barriers and calling for assistance prn. Telesitter at bedside.

## 2023-12-02 LAB
ALBUMIN SERPL BCP-MCNC: 2 G/DL (ref 3.5–5.2)
ALBUMIN SERPL BCP-MCNC: 2.1 G/DL (ref 3.5–5.2)
ALP SERPL-CCNC: 173 U/L (ref 55–135)
ALP SERPL-CCNC: 176 U/L (ref 55–135)
ALP SERPL-CCNC: 183 U/L (ref 55–135)
ALP SERPL-CCNC: 187 U/L (ref 55–135)
ALT SERPL W/O P-5'-P-CCNC: 105 U/L (ref 10–44)
ALT SERPL W/O P-5'-P-CCNC: 107 U/L (ref 10–44)
ALT SERPL W/O P-5'-P-CCNC: 108 U/L (ref 10–44)
ALT SERPL W/O P-5'-P-CCNC: 108 U/L (ref 10–44)
ANION GAP SERPL CALC-SCNC: 6 MMOL/L (ref 8–16)
ANION GAP SERPL CALC-SCNC: 9 MMOL/L (ref 8–16)
AST SERPL-CCNC: 158 U/L (ref 10–40)
AST SERPL-CCNC: 160 U/L (ref 10–40)
AST SERPL-CCNC: 166 U/L (ref 10–40)
AST SERPL-CCNC: 181 U/L (ref 10–40)
BASOPHILS # BLD AUTO: 0.03 K/UL (ref 0–0.2)
BASOPHILS NFR BLD: 0.6 % (ref 0–1.9)
BILIRUB SERPL-MCNC: 3.5 MG/DL (ref 0.1–1)
BILIRUB SERPL-MCNC: 3.9 MG/DL (ref 0.1–1)
BILIRUB SERPL-MCNC: 4.1 MG/DL (ref 0.1–1)
BILIRUB SERPL-MCNC: 4.3 MG/DL (ref 0.1–1)
BUN SERPL-MCNC: 6 MG/DL (ref 6–20)
BUN SERPL-MCNC: 6 MG/DL (ref 6–20)
BUN SERPL-MCNC: 7 MG/DL (ref 6–20)
BUN SERPL-MCNC: 8 MG/DL (ref 6–20)
CALCIUM SERPL-MCNC: 7.8 MG/DL (ref 8.7–10.5)
CALCIUM SERPL-MCNC: 7.9 MG/DL (ref 8.7–10.5)
CALCIUM SERPL-MCNC: 8.1 MG/DL (ref 8.7–10.5)
CALCIUM SERPL-MCNC: 8.2 MG/DL (ref 8.7–10.5)
CHLORIDE SERPL-SCNC: 111 MMOL/L (ref 95–110)
CHLORIDE SERPL-SCNC: 112 MMOL/L (ref 95–110)
CHLORIDE SERPL-SCNC: 114 MMOL/L (ref 95–110)
CHLORIDE SERPL-SCNC: 114 MMOL/L (ref 95–110)
CO2 SERPL-SCNC: 13 MMOL/L (ref 23–29)
CO2 SERPL-SCNC: 16 MMOL/L (ref 23–29)
CO2 SERPL-SCNC: 17 MMOL/L (ref 23–29)
CO2 SERPL-SCNC: 18 MMOL/L (ref 23–29)
CREAT SERPL-MCNC: 0.5 MG/DL (ref 0.5–1.4)
CREAT SERPL-MCNC: 0.6 MG/DL (ref 0.5–1.4)
CREAT SERPL-MCNC: 0.7 MG/DL (ref 0.5–1.4)
CREAT SERPL-MCNC: 0.7 MG/DL (ref 0.5–1.4)
DIFFERENTIAL METHOD: ABNORMAL
EOSINOPHIL # BLD AUTO: 0.4 K/UL (ref 0–0.5)
EOSINOPHIL NFR BLD: 7.7 % (ref 0–8)
ERYTHROCYTE [DISTWIDTH] IN BLOOD BY AUTOMATED COUNT: 14.8 % (ref 11.5–14.5)
EST. GFR  (NO RACE VARIABLE): >60 ML/MIN/1.73 M^2
GLUCOSE SERPL-MCNC: 132 MG/DL (ref 70–110)
GLUCOSE SERPL-MCNC: 154 MG/DL (ref 70–110)
GLUCOSE SERPL-MCNC: 202 MG/DL (ref 70–110)
GLUCOSE SERPL-MCNC: 99 MG/DL (ref 70–110)
HCT VFR BLD AUTO: 30.2 % (ref 37–48.5)
HGB BLD-MCNC: 10.4 G/DL (ref 12–16)
IMM GRANULOCYTES # BLD AUTO: 0.07 K/UL (ref 0–0.04)
IMM GRANULOCYTES NFR BLD AUTO: 1.5 % (ref 0–0.5)
LITHIUM SERPL-SCNC: 0.7 MMOL/L (ref 0.6–1.2)
LYMPHOCYTES # BLD AUTO: 0.9 K/UL (ref 1–4.8)
LYMPHOCYTES NFR BLD: 18.5 % (ref 18–48)
MAGNESIUM SERPL-MCNC: 1.1 MG/DL (ref 1.6–2.6)
MCH RBC QN AUTO: 37.8 PG (ref 27–31)
MCHC RBC AUTO-ENTMCNC: 34.4 G/DL (ref 32–36)
MCV RBC AUTO: 110 FL (ref 82–98)
MONOCYTES # BLD AUTO: 0.3 K/UL (ref 0.3–1)
MONOCYTES NFR BLD: 7 % (ref 4–15)
NEUTROPHILS # BLD AUTO: 3 K/UL (ref 1.8–7.7)
NEUTROPHILS NFR BLD: 64.7 % (ref 38–73)
NRBC BLD-RTO: 0 /100 WBC
PHOSPHATE SERPL-MCNC: 1.8 MG/DL (ref 2.7–4.5)
PLATELET # BLD AUTO: ABNORMAL K/UL (ref 150–450)
PMV BLD AUTO: ABNORMAL FL (ref 9.2–12.9)
POTASSIUM SERPL-SCNC: 2.8 MMOL/L (ref 3.5–5.1)
POTASSIUM SERPL-SCNC: 3.1 MMOL/L (ref 3.5–5.1)
POTASSIUM SERPL-SCNC: 3.5 MMOL/L (ref 3.5–5.1)
POTASSIUM SERPL-SCNC: 3.6 MMOL/L (ref 3.5–5.1)
PROT SERPL-MCNC: 5.1 G/DL (ref 6–8.4)
PROT SERPL-MCNC: 5.2 G/DL (ref 6–8.4)
RBC # BLD AUTO: 2.75 M/UL (ref 4–5.4)
SODIUM SERPL-SCNC: 136 MMOL/L (ref 136–145)
SODIUM SERPL-SCNC: 136 MMOL/L (ref 136–145)
SODIUM SERPL-SCNC: 138 MMOL/L (ref 136–145)
SODIUM SERPL-SCNC: 138 MMOL/L (ref 136–145)
WBC # BLD AUTO: 4.7 K/UL (ref 3.9–12.7)

## 2023-12-02 PROCEDURE — 80178 ASSAY OF LITHIUM: CPT | Performed by: STUDENT IN AN ORGANIZED HEALTH CARE EDUCATION/TRAINING PROGRAM

## 2023-12-02 PROCEDURE — 63600175 PHARM REV CODE 636 W HCPCS: Performed by: PHYSICIAN ASSISTANT

## 2023-12-02 PROCEDURE — 99222 1ST HOSP IP/OBS MODERATE 55: CPT | Mod: ,,, | Performed by: PSYCHIATRY & NEUROLOGY

## 2023-12-02 PROCEDURE — 63600175 PHARM REV CODE 636 W HCPCS: Performed by: STUDENT IN AN ORGANIZED HEALTH CARE EDUCATION/TRAINING PROGRAM

## 2023-12-02 PROCEDURE — 85025 COMPLETE CBC W/AUTO DIFF WBC: CPT | Performed by: PHYSICIAN ASSISTANT

## 2023-12-02 PROCEDURE — 99222 PR INITIAL HOSPITAL CARE,LEVL II: ICD-10-PCS | Mod: ,,, | Performed by: PSYCHIATRY & NEUROLOGY

## 2023-12-02 PROCEDURE — 84100 ASSAY OF PHOSPHORUS: CPT | Performed by: STUDENT IN AN ORGANIZED HEALTH CARE EDUCATION/TRAINING PROGRAM

## 2023-12-02 PROCEDURE — 25000003 PHARM REV CODE 250: Performed by: STUDENT IN AN ORGANIZED HEALTH CARE EDUCATION/TRAINING PROGRAM

## 2023-12-02 PROCEDURE — 80053 COMPREHEN METABOLIC PANEL: CPT | Mod: 91 | Performed by: STUDENT IN AN ORGANIZED HEALTH CARE EDUCATION/TRAINING PROGRAM

## 2023-12-02 PROCEDURE — 83735 ASSAY OF MAGNESIUM: CPT | Performed by: STUDENT IN AN ORGANIZED HEALTH CARE EDUCATION/TRAINING PROGRAM

## 2023-12-02 PROCEDURE — 80053 COMPREHEN METABOLIC PANEL: CPT | Mod: 91

## 2023-12-02 PROCEDURE — 25000003 PHARM REV CODE 250: Performed by: PHYSICIAN ASSISTANT

## 2023-12-02 PROCEDURE — 36415 COLL VENOUS BLD VENIPUNCTURE: CPT

## 2023-12-02 PROCEDURE — 21400001 HC TELEMETRY ROOM

## 2023-12-02 RX ORDER — SODIUM,POTASSIUM PHOSPHATES 280-250MG
2 POWDER IN PACKET (EA) ORAL ONCE
Status: COMPLETED | OUTPATIENT
Start: 2023-12-02 | End: 2023-12-02

## 2023-12-02 RX ORDER — MAGNESIUM SULFATE HEPTAHYDRATE 40 MG/ML
2 INJECTION, SOLUTION INTRAVENOUS ONCE
Status: COMPLETED | OUTPATIENT
Start: 2023-12-02 | End: 2023-12-02

## 2023-12-02 RX ADMIN — FERROUS SULFATE TAB EC 325 MG (65 MG FE EQUIVALENT) 1 EACH: 325 (65 FE) TABLET DELAYED RESPONSE at 08:12

## 2023-12-02 RX ADMIN — PANTOPRAZOLE SODIUM 40 MG: 40 GRANULE, DELAYED RELEASE ORAL at 08:12

## 2023-12-02 RX ADMIN — LACTULOSE 30 G: 20 SOLUTION ORAL at 08:12

## 2023-12-02 RX ADMIN — LEVETIRACETAM 1000 MG: 500 SOLUTION ORAL at 08:12

## 2023-12-02 RX ADMIN — RIFAXIMIN 550 MG: 550 TABLET ORAL at 08:12

## 2023-12-02 RX ADMIN — CHOLECALCIFEROL TAB 25 MCG (1000 UNIT) 2000 UNITS: 25 TAB at 08:12

## 2023-12-02 RX ADMIN — CEFTRIAXONE 1 G: 1 INJECTION, POWDER, FOR SOLUTION INTRAMUSCULAR; INTRAVENOUS at 06:12

## 2023-12-02 RX ADMIN — CINACALCET 30 MG: 30 TABLET, FILM COATED ORAL at 08:12

## 2023-12-02 RX ADMIN — ONDANSETRON 8 MG: 8 TABLET, ORALLY DISINTEGRATING ORAL at 02:12

## 2023-12-02 RX ADMIN — MAGNESIUM SULFATE HEPTAHYDRATE 2 G: 40 INJECTION, SOLUTION INTRAVENOUS at 08:12

## 2023-12-02 RX ADMIN — POTASSIUM & SODIUM PHOSPHATES POWDER PACK 280-160-250 MG 2 PACKET: 280-160-250 PACK at 08:12

## 2023-12-02 RX ADMIN — CINACALCET 30 MG: 30 TABLET, FILM COATED ORAL at 05:12

## 2023-12-02 RX ADMIN — LACTULOSE 30 G: 20 SOLUTION ORAL at 02:12

## 2023-12-02 RX ADMIN — OLANZAPINE 5 MG: 2.5 TABLET, FILM COATED ORAL at 08:12

## 2023-12-02 NOTE — SUBJECTIVE & OBJECTIVE
Interval History: Pt alert but tearful this am  Na downtrended to 138 s/p hypotonic fluids  Ca wnl  Psychiatry consulted due to supratherapeutic lithium levels in the setting of recurrent hyperparathyroid hypercalcemia     Review of Systems  Objective:     Vital Signs (Most Recent):  Temp: 98.2 °F (36.8 °C) (12/02/23 1101)  Pulse: 76 (12/02/23 1101)  Resp: 18 (12/02/23 1101)  BP: 128/61 (12/02/23 1101)  SpO2: (!) 93 % (12/02/23 1101) Vital Signs (24h Range):  Temp:  [96.4 °F (35.8 °C)-98.2 °F (36.8 °C)] 98.2 °F (36.8 °C)  Pulse:  [63-86] 76  Resp:  [16-18] 18  SpO2:  [93 %-99 %] 93 %  BP: (109-128)/(56-64) 128/61     Weight: 58.5 kg (129 lb)  Body mass index is 23.59 kg/m².    Intake/Output Summary (Last 24 hours) at 12/2/2023 1314  Last data filed at 12/2/2023 0615  Gross per 24 hour   Intake 540 ml   Output 1650 ml   Net -1110 ml         Physical Exam  Vitals and nursing note reviewed.   Constitutional:       General: She is sleeping. She is not in acute distress.     Appearance: She is cachectic.   Cardiovascular:      Rate and Rhythm: Normal rate and regular rhythm.      Heart sounds: Normal heart sounds.   Pulmonary:      Effort: Pulmonary effort is normal. No respiratory distress.      Breath sounds: Normal breath sounds. No wheezing.   Abdominal:      General: Bowel sounds are normal. There is no distension.      Palpations: Abdomen is soft.      Tenderness: There is no abdominal tenderness.   Musculoskeletal:         General: No tenderness. Normal range of motion.      Cervical back: Normal range of motion and neck supple.   Lymphadenopathy:      Cervical: No cervical adenopathy.   Skin:     General: Skin is warm and dry.      Capillary Refill: Capillary refill takes less than 2 seconds.      Findings: Ecchymosis present. No rash.   Neurological:      Mental Status: She is disoriented.      Comments: Poor participation             Significant Labs: All pertinent labs within the past 24 hours have been  reviewed.    Significant Imaging: I have reviewed all pertinent imaging results/findings within the past 24 hours.

## 2023-12-02 NOTE — PROGRESS NOTES
[facility]  Progress Note    Patient Name: Marely Hamilton  MRN: [id number]  Patient Class: IP- Inpatient   Admission Date: 11/29/2023  Length of Stay: 3 days  Attending Physician: Lavell Chatterjee*  Primary Care Provider: Viktor Ross MD        Subjective:     Principal Problem:Acute metabolic encephalopathy        HPI:  Patient is a 58-year-old woman with EtOH cirrhosis, bipolar disorder, anemia, thrombocytopenia, cholelithiasis, DVT with IVC filter in place, GI bleed, ESBL UTI history, seizure disorder, presenting from nursing [facility] with encephalopathy with unwitnessed fall. She was found down at her nursing home. Per chart review patient is normally A&O x3 1 month prior.  She does not take blood thinners. Patient denies any pain at this time. She reports she has been urinating frequently though difficult to ascertain history. She reports the last thing she remembers today before she arrived to the hospital was using the bathroom. She denies recent illness/fever/chills/nausea/vomiting/diarrhea/constipation.       Overview/Hospital Course:  In the ED, vitals stable. Intake labs remarkable for Na 146, BUN 37, Cr 1.4, Calcium 14.2 (corrected 15), , Tbili 4.8, , , Ammonia 51, lipase 69, lactic 2.3, UA with 67 WBCs. She was given 2L IVF, calcitonin, ceftriaxone and admitted for further management.    Interval History: Pt alert but tearful this am  Na downtrended to 138 s/p hypotonic fluids  Ca wnl  Psychiatry consulted due to supratherapeutic lithium levels in the setting of recurrent hyperparathyroid hypercalcemia     Review of Systems  Objective:     Vital Signs (Most Recent):  Temp: 98.2 °F (36.8 °C) (12/02/23 1101)  Pulse: 76 (12/02/23 1101)  Resp: 18 (12/02/23 1101)  BP: 128/61 (12/02/23 1101)  SpO2: (!) 93 % (12/02/23 1101) Vital Signs (24h Range):  Temp:  [96.4 °F (35.8 °C)-98.2 °F (36.8 °C)] 98.2 °F (36.8 °C)  Pulse:  [63-86] 76  Resp:  [16-18]  18  SpO2:  [93 %-99 %] 93 %  BP: (109-128)/(56-64) 128/61     Weight: 58.5 kg (129 lb)  Body mass index is 23.59 kg/m².    Intake/Output Summary (Last 24 hours) at 12/2/2023 1314  Last data filed at 12/2/2023 0615  Gross per 24 hour   Intake 540 ml   Output 1650 ml   Net -1110 ml         Physical Exam  Vitals and nursing note reviewed.   Constitutional:       General: She is sleeping. She is not in acute distress.     Appearance: She is cachectic.   Cardiovascular:      Rate and Rhythm: Normal rate and regular rhythm.      Heart sounds: Normal heart sounds.   Pulmonary:      Effort: Pulmonary effort is normal. No respiratory distress.      Breath sounds: Normal breath sounds. No wheezing.   Abdominal:      General: Bowel sounds are normal. There is no distension.      Palpations: Abdomen is soft.      Tenderness: There is no abdominal tenderness.   Musculoskeletal:         General: No tenderness. Normal range of motion.      Cervical back: Normal range of motion and neck supple.   Lymphadenopathy:      Cervical: No cervical adenopathy.   Skin:     General: Skin is warm and dry.      Capillary Refill: Capillary refill takes less than 2 seconds.      Findings: Ecchymosis present. No rash.   Neurological:      Mental Status: She is disoriented.      Comments: Poor participation             Significant Labs: All pertinent labs within the past 24 hours have been reviewed.    Significant Imaging: I have reviewed all pertinent imaging results/findings within the past 24 hours.    Assessment/Plan:      * Acute metabolic encephalopathy  Acute cystitis  Hypercalcemia  Hepatic encephalopathy  Patient presents from NH with acute mentation change in the setting of hypercalcemia and UTI.  - continue lactulose and rifaximin  - continue ceftriaxone  - follow cultures  - hyperCa management as below      Body mass index (BMI) less than 19  - BMI 16.33  - dietary consulted      Fall  - CTH, XR pelvis unremarkable  - no bony  tenderness      Hypomagnesemia  Patient has Abnormal Magnesium: hypomagnesemia. Will continue to monitor electrolytes closely. Will replace the affected electrolytes and repeat labs to be done after interventions completed. The patient's magnesium results have been reviewed and are listed below.  Recent Labs   Lab 12/01/23  1210   MG 2.1        Presence of IVC filter  - history of bleeds  - not on AC      Hypercalcemia  The patient has hypercalcemia that is currently uncontrolled. The patient has the following symptoms due to their hypercalcemia: polydypsia and/or polyuria, weakness, and encephalopathy. The hypercalcemia is likely due to Hyperparathyroidism. We will obtain the following labs to work up the hypercalcemia: PTH   PTH, Intact   Date Value Ref Range Status   11/29/2023 115.1 (H) 9.0 - 77.0 pg/mL Final    . We will treat the hypercalcemia with: IV fluids and Calcitonin. Their latest calcium has been reviewed and is listed below.    Ionized Calcium   Date Value Ref Range Status   08/02/2023 0.90 (L) 1.06 - 1.42 mmol/L Final     - endocrine consulted and following    Hypernatremia  Patient has hypernatremia which is controlled. The hypernatremia is due to Dehydration. We will aim to correct the sodium by 8-10mEq in 24 hours. We will correct their hypernatremia with Select IV fluids: D5W/0.45 NaCl at a rate of 75 ml/hr. The patient's sodium results have been reviewed and are listed below.  Recent Labs   Lab 12/02/23  1136        Hold lasix and spironolactone    Bipolar 1 disorder, depressed, severe  - continue olanzapine 5mg qhs  - lithium level increased 1.6  - concern for recurrent hyperparathyroid hypercalcemia secondary to lithium  - psych consulted. Lithium dc. Repeat lithium level. Pt PEC per psych recommendations for concern for SI      Severe protein-calorie malnutrition  Nutrition consulted. Most recent weight and BMI monitored-     Measurements:  Wt Readings from Last 1 Encounters:   11/29/23  40.5 kg (89 lb 4.6 oz)   Body mass index is 16.33 kg/m².  - dietary consulted, history       Interventions/Recommendations (treatment strategy):   - boost TID      Thrombocytopenia  Patient was found to have thrombocytopenia, the likely etiology is secondary to cirrhosis/portal hypertension, will monitor the platelets Daily. Will transfuse if platelet count is <10k. Hold DVT prophylaxis if platelets are <50k. The patient's platelet results have been reviewed and are listed below.  Recent Labs   Lab 11/29/23  0329   *         Cirrhosis, Capri's  Patient with known Cirrhosis. Co-morbidities are present and inclusive of hepatic encephalopathy and malnutrition.  MELD-Na score calculated; MELD 3.0: 22 at 12/1/2023 12:10 PM  MELD-Na: 19 at 12/1/2023 12:10 PM  Calculated from:  Serum Creatinine: 0.8 mg/dL (Using min of 1 mg/dL) at 12/1/2023 12:10 PM  Serum Sodium: 147 mmol/L (Using max of 137 mmol/L) at 12/1/2023 12:10 PM  Total Bilirubin: 4.4 mg/dL at 12/1/2023 12:10 PM  Serum Albumin: 2.3 g/dL at 12/1/2023 12:10 PM  INR(ratio): 1.9 at 11/29/2023  8:21 AM  Age at listing (hypothetical): 58 years  Sex: Female at 12/1/2023 12:10 PM      Continue chronic meds. Will avoid any hepatotoxic meds, and monitor CBC/CMP/INR for synthetic function.   Continue lactulose  Continue Rifaximin      VTE Risk Mitigation (From admission, onward)           Ordered     IP VTE HIGH RISK PATIENT  Once         11/29/23 0746                    Discharge Planning   BRAYAN: 12/2/2023     Code Status: Full Code   Is the patient medically ready for discharge?: No    Reason for patient still in hospital (select all that apply): Patient trending condition and Treatment  Discharge Plan A: Return to nursing home          Lavell Chatterjee MD  Department of Hospital Medicine   [facility] - [facility]

## 2023-12-02 NOTE — PLAN OF CARE
Problem: Violence Risk or Actual  Goal: Anger and Impulse Control  Outcome: Ongoing, Progressing  Intervention: Minimize Safety Risk  Flowsheets (Taken 12/2/2023 0955)  Behavior Management:   behavioral plan developed   boundaries reinforced  Sensory Stimulation Regulation: quiet environment promoted  Enhanced Safety Measures: monitored by video  Intervention: Promote Self-Control  Flowsheets (Taken 12/2/2023 0955)  Supportive Measures: active listening utilized  Environmental Support: calm environment promoted     Problem: Adult Inpatient Plan of Care  Goal: Plan of Care Review  Outcome: Ongoing, Progressing  Goal: Patient-Specific Goal (Individualized)  Outcome: Ongoing, Progressing  Flowsheets (Taken 12/2/2023 0955)  Anxieties, Fears or Concerns: none  Individualized Care Needs: turn q 2 hours  Goal: Absence of Hospital-Acquired Illness or Injury  Outcome: Ongoing, Progressing  Intervention: Identify and Manage Fall Risk  Flowsheets (Taken 12/2/2023 0955)  Safety Promotion/Fall Prevention:   assistive device/personal item within reach   side rails raised x 2   medications reviewed  Intervention: Prevent Skin Injury  Flowsheets (Taken 12/2/2023 0955)  Body Position: left  Skin Protection:   adhesive use limited   tubing/devices free from skin contact  Intervention: Prevent and Manage VTE (Venous Thromboembolism) Risk  Flowsheets (Taken 12/2/2023 0955)  Activity Management: Arm raise - L1  VTE Prevention/Management:   bleeding precations maintained   bleeding risk assessed   ROM (active) performed  Range of Motion: active ROM (range of motion) encouraged  Goal: Optimal Comfort and Wellbeing  Outcome: Ongoing, Progressing  Intervention: Monitor Pain and Promote Comfort  Flowsheets (Taken 12/2/2023 0955)  Pain Management Interventions: pain management plan reviewed with patient/caregiver

## 2023-12-02 NOTE — CONSULTS
"CONSULTATION LIAISON PSYCHIATRY INITIAL EVALUATION    Patient Name: Marely Hamilton  MRN: [id number]  Patient Class: IP- Inpatient  Admission Date: 11/29/2023  Attending Physician: Lavell Chatterjee*      HPI:   Marely Hamilton is a 58 y.o. female with past psychiatric history of bipolar disorder, alcohol use disorder & past pertinent medical history of seizure disorder, alcohol induced hepatic cirrhosis  presenting from [facility] with encephalopathy with unwitnessed fall.    Psychiatry consulted for bipolar on lithium, presented with hypercalcemia and supratherapeutic lithium level, has history of this, decrease dose vs dc?     On psych exam,  the patient was notably somnolent  . Patient was oriented to self and place but disoriented to time. Hard to have conversation with patient but she reported passive SI and stated she felt safe here with no plans to end her life. Patient reports she has only been on lithium and does not think she has been on anything else. When asked about AH she reported "god says he loves me".  When seen by staff later in the am she reports SI and has thoughts of ending her life in the hospital. Also endorsed "voices telling me to kill myself".Patient does not have a plan but now does not feel safe. She denied any other symptoms. Denied HI/VH.     Per chart review,   - Notable for albumin corrected calcium elevations on labs intermittently since 2015  - PTH has been inappropriately normal or mildly elevated since at least 2020     She had similar labs on admission in May 2023 with calcium acutely elevated and at that time Reclast and calcitonin were given with improvement in serum calcium and actually a slightly low serum calcium when corrected for albumin in the days/weeks following that.  Thoughts were that dehydration and supra-therapeutic lithium levels were the cause.  She was hydrated on that admission and lithium was stopped and patient was started on abilify 5 mg. She " "was eventually titrated up to 20 mg and then stopped.In June,  lithium 150mg was resumed after speaking to patient's outpatient provider who reported that patient has severe episodes of crystal and decompensates rapidly previously after conferring with outpt provider. And she was discharged on Lithium 150mg qhs and Zyprexa 5mg qhs upon discharge to SNF and managed by outpt provider. She was restarted on 300 mg of lithium on 7/28 by the  psychiatry team and continued the zyprexa nightly. The patient was tolerating the lithium well at 300 mg and it seemed to target her mood symptoms but was increased to 600  mg at discharge.     Medical Review of Systems:  Pertinent items are noted in HPI.    Psychiatric Review of Systems (is patient experiencing or having changes in):  See HPI for SI. Attempted to do a full ROS but unable to assess due to patient's acute AMS / Encephalopathy     Per chart review as patient unable to fully participate  Past Psychiatric History:  Previous Medication Trials: yes; see above   Previous Psychiatric Hospitalizations: yes, multiple   Previous Suicide Attempts: yes  History of Violence: denies   Outpatient Psychiatrist: yes, Dr Coates  Family Psychiatric History: yes, states father w/ unclear history bipolar disorder     Substance Abuse History (with emphasis over the last 12 months):  Recreational Drugs:  denies  Use of Alcohol:  reports history of heavy alcohol consumption with resultant alcohol induced cirrhosis, reports being "clean and sober", denies current consumption    Tobacco Use:no  Rehab History:yes     Social History:  Marital Status: not   Children: 0  Employment Status/Info: on disability, previously worked as    :no  Education: post college graduate work or degree, reports receiving EDIN from [facility] Ed: no  Housing Status: [facility]  Access to gun: no  Psychosocial Stressors: family, health, and drug and alcohol  Functioning " Relationships: good support system     Legal History:  Past Charges/Incarcerations: Reports DUI  Pending charges:no    Mental Status Exam:  Behavior/Cooperation:  somnolent, psychomotor retardation, eye contact minimal  Speech:  slowed, increased latency of response, soft, paucity   Language: grossly intact with spontaneous speech  Mood: Attempted but unable to assess due to acute encephalopathy   Affect:  Blunted   Associations: intermittent TRISTAN  Thought Process: Confused / Illogical  Thought Content: Attempted but unable to assess due to acute encephalopathy   Sensorium: Delirium/Somnolence  Alert and Oriented: to person and place ; disoriented to city, month, and situation   Memory: Attempted but unable to assess due to acute encephalopathy   Attention/concentration: Impaired / Limited. Unable to spell w-[id number]-r-l-d & d-l-r-o-w.   Similarities: Impaired / Limited (difference between apple and orange?)  Abstract reasoning: Impaired / Limited   Fund of Knowledge: Attempted but unable to assess due to acute encephalopathy   Insight: Impaired / Limited   Judgment: Impaired / Limited     CAM ICU positive? yes      ASSESSMENT & RECOMMENDATIONS   Hx of Hepatic Encephalopathy   Hx of Acute Metabolic Encephalopathy    Delirium due to Medical Condition (Metabolic Encephalopathy) Without Behavioral   Bipolar Affective Disorder- treated with lithium   -R/O lithium induced secondary hyperparathyroidism   Alcohol Use Disorder, Severe, Dependence, In Sustained Remission        BIPOLAR I/II  PSYCH MEDICATIONS  Recommend stopping 600mg lithium and obtaining a lithium level now. Would also consider normal saline infusion to decrease lithium level.  Continue zyprexa 5 mg nightly   PRN haldol 2mg PO/IV q8 hrs PRN for non-redirectable agitation. If she requires a PRN please obtain EKG and monitor for Qtc changes       DELIRIUM  DELIRIUM BEHAVIOR MANAGEMENT  PLEASE utilize PRN meds first for agitation. Minimize use of PHYSICAL restraints  OR have periods of being out of physical restraints if possible.  Keep window shades open and room lit during day and room dim at night in order to promote normal sleep-wake cycles  Encourage family at bedside. [place] patient often to situation, location, date.  Continue to Limit or Discontinue use of Narcotics, Benzos and Anti-cholinergic medications as they may worsen delirium.  Continue medical workup for causative etiology of Delirium.     RISK ASSESSMENT  NEEDS PEC because patient is in imminent danger of hurting self or others and is gravely disabled. & NEEDS 1:1 sitter    FOLLOW UP  Will follow up while in house    DISPOSITION - once medically cleared:   Seek involuntary inpatient psychiatric admission for stabilization of acute psychiatric symptoms and a safe disposition plan is enacted. The patient &/or their family was informed that the patient will be transferred to an inpatient unit per ED/primary placement team. Can reassess daily.     Please contact ON CALL psychiatry service (24/7) for any acute issues that may arise.    Dr. Mary Lou VACA Psychiatry  [facility]  12/2/2023 10:04 AM        --------------------------------------------------------------------------------------------------------------------------------------------------------------------------------------------------------------------------------------    CONTINUED HISTORY & OBJECTIVE clinical data & findings reviewed and noted for above decision making    Past Medical/Surgical History:   Past Medical History:   Diagnosis Date    Addiction to drug     Alcohol abuse     Alcohol abuse, in remission 6/15/2015    14.5 weeks ago; AA weekly    Anemia     Anxiety 6/15/2015    Behavioral problem     Bipolar disorder     Bipolar disorder in remission 6/15/2015    Cirrhosis, Laennec's 6/15/2015    Depression     Encounter for blood transfusion     Epistaxis 6/15/2015    Fatigue     Glaucoma     Hematuria     Hepatic  encephalopathy 6/15/2015    Hepatic enlargement     History of psychiatric care     History of psychiatric hospitalization     History of seizure 6/15/2015    1    hx of intentional Tylenol overdose 6/15/2015    2005- situational and hx of bipolar    Hx of psychiatric care     Macrocytic anemia 9/18/2015    6 units PRBC since June 2015    Jeana     Osteoarthritis     Other ascites 6/15/2015    Psychiatric exam requested by authority     Psychiatric problem     Psychosis 9/26/2019    Renal disorder     Seizures     Self-harming behavior     Suicide attempt     Therapy     Thrombocytopenia 6/15/2015     Past Surgical History:   Procedure Laterality Date    COSMETIC SURGERY      EMBOLIZATION N/A 6/11/2023    Procedure: EMBOLIZATION, BLOOD VESSEL;  Surgeon: Debbie Surgeon;  Location: [facility];  Service: Anesthesiology;  Laterality: N/A;    ESOPHAGOGASTRODUODENOSCOPY      ESOPHAGOGASTRODUODENOSCOPY N/A 7/6/2023    Procedure: EGD (ESOPHAGOGASTRODUODENOSCOPY);  Surgeon: Bernice Guillen MD;  Location: [address]);  Service: Endoscopy;  Laterality: N/A;    ESOPHAGOGASTRODUODENOSCOPY N/A 7/11/2023    Procedure: EGD (ESOPHAGOGASTRODUODENOSCOPY);  Surgeon: Rangel Broussard MD;  Location: [address]);  Service: Endoscopy;  Laterality: N/A;       Current Medications:   Scheduled Meds:    cefTRIAXone (ROCEPHIN) IVPB  1 g Intravenous Q24H    cinacalcet  30 mg Oral BID WM    ferrous sulfate  1 tablet Oral Daily    lactulose  30 g Oral TID    levetiracetam  1,000 mg Oral BID    lithium  600 mg Oral QHS    magnesium sulfate IVPB  2 g Intravenous Once    OLANZapine  5 mg Oral QHS    pantoprazole  40 mg Oral BID    rifAXIMin  550 mg Oral BID    vitamin D  2,000 Units Oral Daily     PRN Meds: acetaminophen, aluminum-magnesium hydroxide-simethicone, bisacodyL, melatonin, ondansetron, polyethylene glycol, simethicone, sodium chloride 0.9%    Allergies:   Review of patient's allergies indicates:   Allergen Reactions     Sulfa (sulfonamide antibiotics) Rash    Codeine Nausea And Vomiting       Vitals  Vitals:    12/02/23 0835   BP: (!) 119/59   Pulse: 73   Resp: 16   Temp: 96.4 °F (35.8 °C)       Labs/Imaging/Studies:  Recent Results (from the past 24 hour(s))   Comprehensive metabolic panel    Collection Time: 12/01/23 12:10 PM   Result Value Ref Range    Sodium 147 (H) 136 - 145 mmol/L    Potassium 3.4 (L) 3.5 - 5.1 mmol/L    Chloride 123 (H) 95 - 110 mmol/L    CO2 19 (L) 23 - 29 mmol/L    Glucose 145 (H) 70 - 110 mg/dL    BUN 11 6 - 20 mg/dL    Creatinine 0.8 0.5 - 1.4 mg/dL    Calcium 9.5 8.7 - 10.5 mg/dL    Total Protein 5.6 (L) 6.0 - 8.4 g/dL    Albumin 2.3 (L) 3.5 - 5.2 g/dL    Total Bilirubin 4.4 (H) 0.1 - 1.0 mg/dL    Alkaline Phosphatase 186 (H) 55 - 135 U/L     (H) 10 - 40 U/L     (H) 10 - 44 U/L    eGFR >60.0 >60 mL/min/1.73 m^2    Anion Gap 5 (L) 8 - 16 mmol/L   Phosphorus    Collection Time: 12/01/23 12:10 PM   Result Value Ref Range    Phosphorus 1.2 (L) 2.7 - 4.5 mg/dL   Magnesium    Collection Time: 12/01/23 12:10 PM   Result Value Ref Range    Magnesium 2.1 1.6 - 2.6 mg/dL   Comprehensive metabolic panel    Collection Time: 12/01/23  7:17 PM   Result Value Ref Range    Sodium 137 136 - 145 mmol/L    Potassium 3.2 (L) 3.5 - 5.1 mmol/L    Chloride 115 (H) 95 - 110 mmol/L    CO2 13 (L) 23 - 29 mmol/L    Glucose 175 (H) 70 - 110 mg/dL    BUN 8 6 - 20 mg/dL    Creatinine 0.7 0.5 - 1.4 mg/dL    Calcium 8.3 (L) 8.7 - 10.5 mg/dL    Total Protein 5.4 (L) 6.0 - 8.4 g/dL    Albumin 2.1 (L) 3.5 - 5.2 g/dL    Total Bilirubin 3.8 (H) 0.1 - 1.0 mg/dL    Alkaline Phosphatase 177 (H) 55 - 135 U/L     (H) 10 - 40 U/L     (H) 10 - 44 U/L    eGFR >60.0 >60 mL/min/1.73 m^2    Anion Gap 9 8 - 16 mmol/L   Comprehensive metabolic panel    Collection Time: 12/01/23 11:16 PM   Result Value Ref Range    Sodium 136 136 - 145 mmol/L    Potassium 3.5 3.5 - 5.1 mmol/L    Chloride 114 (H) 95 - 110 mmol/L    CO2 13 (L)  23 - 29 mmol/L    Glucose 202 (H) 70 - 110 mg/dL    BUN 8 6 - 20 mg/dL    Creatinine 0.7 0.5 - 1.4 mg/dL    Calcium 7.8 (L) 8.7 - 10.5 mg/dL    Total Protein 5.2 (L) 6.0 - 8.4 g/dL    Albumin 2.1 (L) 3.5 - 5.2 g/dL    Total Bilirubin 3.9 (H) 0.1 - 1.0 mg/dL    Alkaline Phosphatase 176 (H) 55 - 135 U/L     (H) 10 - 40 U/L     (H) 10 - 44 U/L    eGFR >60.0 >60 mL/min/1.73 m^2    Anion Gap 9 8 - 16 mmol/L   CBC with Automated Differential    Collection Time: 12/02/23  5:34 AM   Result Value Ref Range    WBC 4.70 3.90 - 12.70 K/uL    RBC 2.75 (L) 4.00 - 5.40 M/uL    Hemoglobin 10.4 (L) 12.0 - 16.0 g/dL    Hematocrit 30.2 (L) 37.0 - 48.5 %     (H) 82 - 98 fL    MCH 37.8 (H) 27.0 - 31.0 pg    MCHC 34.4 32.0 - 36.0 g/dL    RDW 14.8 (H) 11.5 - 14.5 %   Magnesium    Collection Time: 12/02/23  5:34 AM   Result Value Ref Range    Magnesium 1.1 (L) 1.6 - 2.6 mg/dL   Phosphorus    Collection Time: 12/02/23  5:34 AM   Result Value Ref Range    Phosphorus 1.8 (L) 2.7 - 4.5 mg/dL   Comprehensive metabolic panel    Collection Time: 12/02/23  5:34 AM   Result Value Ref Range    Sodium 136 136 - 145 mmol/L    Potassium 3.6 3.5 - 5.1 mmol/L    Chloride 114 (H) 95 - 110 mmol/L    CO2 16 (L) 23 - 29 mmol/L    Glucose 99 70 - 110 mg/dL    BUN 7 6 - 20 mg/dL    Creatinine 0.5 0.5 - 1.4 mg/dL    Calcium 7.9 (L) 8.7 - 10.5 mg/dL    Total Protein 5.1 (L) 6.0 - 8.4 g/dL    Albumin 2.0 (L) 3.5 - 5.2 g/dL    Total Bilirubin 4.3 (H) 0.1 - 1.0 mg/dL    Alkaline Phosphatase 173 (H) 55 - 135 U/L     (H) 10 - 40 U/L     (H) 10 - 44 U/L    eGFR >60.0 >60 mL/min/1.73 m^2    Anion Gap 6 (L) 8 - 16 mmol/L     Imaging Results              X-Ray Pelvis Routine AP (Final result)  Result time 11/29/23 06:20:52      Final result by Mumtaz Penny MD (11/29/23 06:20:52)                   Impression:      Noting limitations above, no convincing evidence of acute displaced fracture or dislocation.      Electronically  signed by: Mumtaz Penny  Date:    11/29/2023  Time:    06:20               Narrative:    EXAMINATION:  XR PELVIS ROUTINE AP    CLINICAL HISTORY:  fall;    TECHNIQUE:  AP view of the pelvis was performed.    COMPARISON:  None.    FINDINGS:  Examination limited by suboptimal patient positioning and overlying bowel gas and stool.    Noting this, no definite evidence of acute displaced fracture or dislocation is identified.  Status post left total hip arthroplasty.  No definite hardware complication single provided view.    Partially imaged IVC filter.    Degenerative findings are noted involving the spine, sacroiliac joints, pubic symphysis, and right hip joint.  Moderate to large volume colonic stool partially imaged.    Phleboliths.    No definite radiopaque foreign body.                                       X-Ray Chest AP Portable (Final result)  Result time 11/29/23 05:29:50      Final result by Rangel Floyd MD (11/29/23 05:29:50)                   Impression:      As above.      Electronically signed by: Rangel Floyd MD  Date:    11/29/2023  Time:    05:29               Narrative:    EXAMINATION:  XR CHEST AP PORTABLE    CLINICAL HISTORY:  cough;    TECHNIQUE:  Single frontal view of the chest was performed.    COMPARISON:  09/16/2023    FINDINGS:  Cardiac monitoring leads overlie the chest.  The cardiac silhouette is not significantly enlarged.  There is stable mild elevation of the right hemidiaphragm.  There is probable subsegmental atelectasis or scarring at the right lung base.  The remaining lungs demonstrate chronic coarse interstitial attenuation.  No new large confluent airspace consolidation appreciated.  No significant volume of pleural fluid or pneumothorax identified.  The visualized osseous structures demonstrate mild degenerative changes.  Postsurgical changes are noted of the right upper abdomen as well as possible cholelithiasis.                                       CT Head Without  Contrast (Final result)  Result time 11/29/23 04:28:42      Final result by Jered Mandujano MD (11/29/23 04:28:42)                   Impression:      No evidence of acute intracranial abnormality.    Electronically signed by resident: Ce Vela  Date:    11/29/2023  Time:    04:19    Electronically signed by: Jered Mandujano MD  Date:    11/29/2023  Time:    04:28               Narrative:    EXAMINATION:  CT HEAD WITHOUT CONTRAST    CLINICAL HISTORY:  Head trauma, abnormal mental status (Age 19-64y);    TECHNIQUE:  Low dose axial CT images obtained throughout the head without the use of intravenous contrast.  Axial, sagittal and coronal reconstructions were performed.    COMPARISON:  CT 07/05/2023, 06/22/2023    FINDINGS:  Mild generalized cerebral volume loss with compensatory prominence of the ventricles and sulci.  There is patchy hypoattenuation through the supratentorial white matter which is nonspecific but may represent chronic microvascular ischemic changes. No parenchymal mass effect, midline shift, hemorrhage, edema or major vascular distribution infarct.    Ventricles and sulci are normal in size for age without evidence of hydrocephalus.    No extra-axial blood or fluid collections.    No displaced calvarial fracture.    Mild mucosal thickening of the paranasal sinuses.  The mastoid air cells are clear.

## 2023-12-02 NOTE — ASSESSMENT & PLAN NOTE
Patient has hypernatremia which is controlled. The hypernatremia is due to Dehydration. We will aim to correct the sodium by 8-10mEq in 24 hours. We will correct their hypernatremia with Select IV fluids: D5W/0.45 NaCl at a rate of 75 ml/hr. The patient's sodium results have been reviewed and are listed below.  Recent Labs   Lab 12/02/23  1136        Hold lasix and spironolactone

## 2023-12-02 NOTE — ASSESSMENT & PLAN NOTE
- continue olanzapine 5mg qhs  - lithium level increased 1.6  - concern for recurrent hyperparathyroid hypercalcemia secondary to lithium  - psych consulted. Lithium dc. Repeat lithium level. Pt PEC per psych recommendations for concern for SI

## 2023-12-02 NOTE — PLAN OF CARE
Problem: Violence Risk or Actual  Goal: Anger and Impulse Control  Outcome: Ongoing, Progressing  Intervention: Minimize Safety Risk  Flowsheets (Taken 12/2/2023 0502)  Behavior Management: behavioral plan developed  Sensory Stimulation Regulation: quiet environment promoted  Enhanced Safety Measures: monitored by video     Problem: Adult Inpatient Plan of Care  Goal: Plan of Care Review  Outcome: Ongoing, Progressing  Goal: Patient-Specific Goal (Individualized)  Outcome: Ongoing, Progressing  Goal: Absence of Hospital-Acquired Illness or Injury  Outcome: Ongoing, Progressing  Intervention: Identify and Manage Fall Risk  Flowsheets (Taken 12/2/2023 0502)  Safety Promotion/Fall Prevention:   assistive device/personal item within reach   medications reviewed   room near unit station   lighting adjusted  Intervention: Prevent Skin Injury  Flowsheets (Taken 12/2/2023 0502)  Skin Protection:   adhesive use limited   tubing/devices free from skin contact  Intervention: Prevent and Manage VTE (Venous Thromboembolism) Risk  Flowsheets (Taken 12/2/2023 0502)  VTE Prevention/Management: bleeding risk assessed  Goal: Optimal Comfort and Wellbeing  Outcome: Ongoing, Progressing  Goal: Readiness for Transition of Care  Outcome: Ongoing, Progressing     Problem: Infection  Goal: Absence of Infection Signs and Symptoms  Outcome: Ongoing, Progressing     Problem: Impaired Wound Healing  Goal: Optimal Wound Healing  Outcome: Ongoing, Progressing     Problem: Skin Injury Risk Increased  Goal: Skin Health and Integrity  Outcome: Ongoing, Progressing  Intervention: Optimize Skin Protection  Flowsheets (Taken 12/2/2023 0502)  Pressure Reduction Devices: specialty bed utilized  Skin Protection:   adhesive use limited   tubing/devices free from skin contact  Head of Bed (HOB) Positioning: HOB at 30-45 degrees

## 2023-12-03 LAB
ALBUMIN SERPL BCP-MCNC: 2 G/DL (ref 3.5–5.2)
ALP SERPL-CCNC: 183 U/L (ref 55–135)
ALT SERPL W/O P-5'-P-CCNC: 101 U/L (ref 10–44)
ANION GAP SERPL CALC-SCNC: 9 MMOL/L (ref 8–16)
AST SERPL-CCNC: 145 U/L (ref 10–40)
BILIRUB SERPL-MCNC: 3.6 MG/DL (ref 0.1–1)
BUN SERPL-MCNC: 7 MG/DL (ref 6–20)
CALCIUM SERPL-MCNC: 7.4 MG/DL (ref 8.7–10.5)
CHLORIDE SERPL-SCNC: 109 MMOL/L (ref 95–110)
CO2 SERPL-SCNC: 17 MMOL/L (ref 23–29)
CREAT SERPL-MCNC: 0.7 MG/DL (ref 0.5–1.4)
EST. GFR  (NO RACE VARIABLE): >60 ML/MIN/1.73 M^2
GLUCOSE SERPL-MCNC: 200 MG/DL (ref 70–110)
MAGNESIUM SERPL-MCNC: 1.2 MG/DL (ref 1.6–2.6)
PHOSPHATE SERPL-MCNC: 1.7 MG/DL (ref 2.7–4.5)
POTASSIUM SERPL-SCNC: 2.9 MMOL/L (ref 3.5–5.1)
PROT SERPL-MCNC: 4.8 G/DL (ref 6–8.4)
SODIUM SERPL-SCNC: 135 MMOL/L (ref 136–145)

## 2023-12-03 PROCEDURE — 25000003 PHARM REV CODE 250: Performed by: PHYSICIAN ASSISTANT

## 2023-12-03 PROCEDURE — 63600175 PHARM REV CODE 636 W HCPCS: Performed by: PHYSICIAN ASSISTANT

## 2023-12-03 PROCEDURE — 99232 SBSQ HOSP IP/OBS MODERATE 35: CPT | Mod: ,,, | Performed by: PSYCHIATRY & NEUROLOGY

## 2023-12-03 PROCEDURE — 63600175 PHARM REV CODE 636 W HCPCS: Performed by: STUDENT IN AN ORGANIZED HEALTH CARE EDUCATION/TRAINING PROGRAM

## 2023-12-03 PROCEDURE — 84100 ASSAY OF PHOSPHORUS: CPT | Performed by: STUDENT IN AN ORGANIZED HEALTH CARE EDUCATION/TRAINING PROGRAM

## 2023-12-03 PROCEDURE — 99232 PR SUBSEQUENT HOSPITAL CARE,LEVL II: ICD-10-PCS | Mod: ,,, | Performed by: PSYCHIATRY & NEUROLOGY

## 2023-12-03 PROCEDURE — 21400001 HC TELEMETRY ROOM

## 2023-12-03 PROCEDURE — 36415 COLL VENOUS BLD VENIPUNCTURE: CPT | Performed by: STUDENT IN AN ORGANIZED HEALTH CARE EDUCATION/TRAINING PROGRAM

## 2023-12-03 PROCEDURE — 25000003 PHARM REV CODE 250: Performed by: STUDENT IN AN ORGANIZED HEALTH CARE EDUCATION/TRAINING PROGRAM

## 2023-12-03 PROCEDURE — 83735 ASSAY OF MAGNESIUM: CPT | Performed by: STUDENT IN AN ORGANIZED HEALTH CARE EDUCATION/TRAINING PROGRAM

## 2023-12-03 PROCEDURE — 80053 COMPREHEN METABOLIC PANEL: CPT | Performed by: STUDENT IN AN ORGANIZED HEALTH CARE EDUCATION/TRAINING PROGRAM

## 2023-12-03 RX ORDER — MAGNESIUM SULFATE HEPTAHYDRATE 40 MG/ML
2 INJECTION, SOLUTION INTRAVENOUS ONCE
Status: COMPLETED | OUTPATIENT
Start: 2023-12-03 | End: 2023-12-03

## 2023-12-03 RX ADMIN — LACTULOSE 30 G: 20 SOLUTION ORAL at 02:12

## 2023-12-03 RX ADMIN — LACTULOSE 30 G: 20 SOLUTION ORAL at 09:12

## 2023-12-03 RX ADMIN — FERROUS SULFATE TAB EC 325 MG (65 MG FE EQUIVALENT) 1 EACH: 325 (65 FE) TABLET DELAYED RESPONSE at 08:12

## 2023-12-03 RX ADMIN — CHOLECALCIFEROL TAB 25 MCG (1000 UNIT) 2000 UNITS: 25 TAB at 08:12

## 2023-12-03 RX ADMIN — LACTULOSE 30 G: 20 SOLUTION ORAL at 08:12

## 2023-12-03 RX ADMIN — POTASSIUM BICARBONATE 40 MEQ: 391 TABLET, EFFERVESCENT ORAL at 02:12

## 2023-12-03 RX ADMIN — PANTOPRAZOLE SODIUM 40 MG: 40 GRANULE, DELAYED RELEASE ORAL at 09:12

## 2023-12-03 RX ADMIN — POTASSIUM BICARBONATE 40 MEQ: 391 TABLET, EFFERVESCENT ORAL at 09:12

## 2023-12-03 RX ADMIN — RIFAXIMIN 550 MG: 550 TABLET ORAL at 08:12

## 2023-12-03 RX ADMIN — PANTOPRAZOLE SODIUM 40 MG: 40 GRANULE, DELAYED RELEASE ORAL at 08:12

## 2023-12-03 RX ADMIN — CEFTRIAXONE 1 G: 1 INJECTION, POWDER, FOR SOLUTION INTRAMUSCULAR; INTRAVENOUS at 08:12

## 2023-12-03 RX ADMIN — CINACALCET 30 MG: 30 TABLET, FILM COATED ORAL at 04:12

## 2023-12-03 RX ADMIN — MAGNESIUM SULFATE HEPTAHYDRATE 2 G: 40 INJECTION, SOLUTION INTRAVENOUS at 09:12

## 2023-12-03 RX ADMIN — OLANZAPINE 5 MG: 2.5 TABLET, FILM COATED ORAL at 09:12

## 2023-12-03 RX ADMIN — LEVETIRACETAM 1000 MG: 500 SOLUTION ORAL at 09:12

## 2023-12-03 RX ADMIN — RIFAXIMIN 550 MG: 550 TABLET ORAL at 09:12

## 2023-12-03 RX ADMIN — CINACALCET 30 MG: 30 TABLET, FILM COATED ORAL at 08:12

## 2023-12-03 RX ADMIN — LEVETIRACETAM 1000 MG: 500 SOLUTION ORAL at 08:12

## 2023-12-03 NOTE — NURSING
Nurses Note -- 4 Eyes      12/3/2023   12:36 AM      Skin assessed during: Daily Assessment      [] No Altered Skin Integrity Present    []Prevention Measures Documented      [x] Yes- Altered Skin Integrity Present or Discovered   [] LDA Added if Not in Epic (Describe Wound)   [x] New Altered Skin Integrity was Present on Admit and Documented in LDA   [] Wound Image Taken    Wound Care Consulted? No    Attending Nurse:  Nisha Rangel RN/Staff Member: Mandy

## 2023-12-03 NOTE — PROGRESS NOTES
"CONSULTATION LIAISON PSYCHIATRY PROGRESS NOTE    Patient Name: Marely Hamilton  MRN: [id number]  Patient Class: IP- Inpatient  Admission Date: 11/29/2023  Attending Physician: Lavell Chatterjee*      SUBJECTIVE:   Marely Hamilton is a 58 y.o. female with past psychiatric history of bipolar disorder, alcohol use disorder & past pertinent medical history of seizure disorder, alcohol induced hepatic cirrhosis  presenting from [facility] with encephalopathy with unwitnessed fall.  She was admitted to the hospital for Acute metabolic encephalopathy.    Psychiatry consulted for bipolar on lithium, presented with hypercalcemia and supratherapeutic lithium level, has history of this, decrease dose vs dc?      Today, patient seen at bedside awake/alert and does not appear to be in any acute distress. She is oriented to time but disoriented to place, stating that she is [facility]. She reports poor sleep overnight but states that her energy is fair today. She is able to report that she has not been receiving home Lithium for the past couple of days. She reports Lithium helps her to "stay calm" when she is at the NH but denies feeling like she needs Lithium at this time. She denies SI at this time. She denies HI, AVH, or paranoia. She reports being "bored" at this time but denies additional complaints or concerns.     OBJECTIVE:    Mental Status Exam:  General Appearance: appears older than stated age, thin and gaunt, disheveled, bruising or arms bilaterally  Behavior: cooperative, appropriate eye-contact, under good behavioral control  Involuntary Movements and Motor Activity: no abnormal involuntary movements noted; no tics, no tremors, no akathisia, no dystonia, no evidence of tardive dyskinesia; no psychomotor agitation or retardation  Gait and Station: unable to assess - patient lying down or seated  Speech and Language: normal rate, normal rhythm, normal volume, monotone  Mood: "good"  Affect: " flat  Thought Process and Associations: intact; linear, goal-directed, organized, and logical; no loosening of associations noted  Thought Content and Perceptions:: no suicidal or homicidal ideation, no auditory or visual hallucinations, no paranoid ideation, no ideas of reference, no evidence of delusions or psychosis  Sensorium and Orientation: oriented to year, oriented to month, oriented to person  Recent and Remote Memory: grossly intact  Attention and Concentration: grossly intact, attentive to conversation, not distractible  Fund of Knowledge: intact, vocabulary appropriate  Insight: limited, limited/partial awareness of illness  Judgment: adequate, behavior is adequate/appropriate to circumstances, compliant with health provider's recommendations and instructions    CAM ICU positive? no      ASSESSMENT & RECOMMENDATIONS   Hx of Hepatic Encephalopathy   Hx of Acute Metabolic Encephalopathy    Delirium due to Medical Condition (Metabolic Encephalopathy) Without Behavioral Disturbance, improving  Bipolar Affective Disorder- treated with lithium   -R/O lithium induced secondary hyperparathyroidism   Alcohol Use Disorder, Severe, Dependence, In Sustained Remission         BIPOLAR I/II  PSYCH MEDICATIONS  Recommend continuing to hold home Lithium and monitor.   Li level 12/2: 0.7  Continue zyprexa 5 mg nightly   PRN haldol 2mg PO/IV q8 hrs PRN for non-redirectable agitation. If she requires a PRN please obtain EKG and monitor for Qtc changes   EKG 11/29 Qtc: 389     DELIRIUM  DELIRIUM BEHAVIOR MANAGEMENT  PLEASE utilize PRN meds first for agitation. Minimize use of PHYSICAL restraints OR have periods of being out of physical restraints if possible.  Keep window shades open and room lit during day and room dim at night in order to promote normal sleep-wake cycles  Encourage family at bedside. [place] patient often to situation, location, date.  Continue to Limit or Discontinue use of Narcotics, Benzos and  Anti-cholinergic medications as they may worsen delirium.  Continue medical workup for causative etiology of Delirium.      RISK ASSESSMENT  Continue PEC at this time because patient is in imminent danger of hurting self or others and is gravely disabled. & NEEDS 1:1 sitter     FOLLOW UP  Will follow up while in house     DISPOSITION - once medically cleared:   Will continue to evaluate need for inpatient psychiatric placement daily.     Please contact ON CALL psychiatry service (24/7) for any acute issues that may arise.    Dr. Mary Tyson   Psychiatry  [facility]  12/3/2023 8:06 AM        --------------------------------------------------------------------------------------------------------------------------------------------------------------------------------------------------------------------------------------    CONTINUED OBJECTIVE clinical data & findings reviewed and noted for above decision making    Current Medications:   Scheduled Meds:    cefTRIAXone (ROCEPHIN) IVPB  1 g Intravenous Q24H    cinacalcet  30 mg Oral BID WM    ferrous sulfate  1 tablet Oral Daily    lactulose  30 g Oral TID    levetiracetam  1,000 mg Oral BID    OLANZapine  5 mg Oral QHS    pantoprazole  40 mg Oral BID    rifAXIMin  550 mg Oral BID    vitamin D  2,000 Units Oral Daily     PRN Meds: acetaminophen, aluminum-magnesium hydroxide-simethicone, bisacodyL, melatonin, ondansetron, polyethylene glycol, simethicone, sodium chloride 0.9%    Allergies:   Review of patient's allergies indicates:   Allergen Reactions    Sulfa (sulfonamide antibiotics) Rash    Codeine Nausea And Vomiting       Vitals  Vitals:    12/03/23 0744   BP: (!) 119/59   Pulse: 80   Resp: 16   Temp: 97.3 °F (36.3 °C)       Labs/Imaging/Studies:  Recent Results (from the past 24 hour(s))   Comprehensive metabolic panel    Collection Time: 12/02/23 11:36 AM   Result Value Ref Range    Sodium 138 136 - 145 mmol/L    Potassium 3.1 (L) 3.5 - 5.1  mmol/L    Chloride 112 (H) 95 - 110 mmol/L    CO2 17 (L) 23 - 29 mmol/L    Glucose 132 (H) 70 - 110 mg/dL    BUN 6 6 - 20 mg/dL    Creatinine 0.6 0.5 - 1.4 mg/dL    Calcium 8.2 (L) 8.7 - 10.5 mg/dL    Total Protein 5.1 (L) 6.0 - 8.4 g/dL    Albumin 2.1 (L) 3.5 - 5.2 g/dL    Total Bilirubin 4.1 (H) 0.1 - 1.0 mg/dL    Alkaline Phosphatase 183 (H) 55 - 135 U/L     (H) 10 - 40 U/L     (H) 10 - 44 U/L    eGFR >60.0 >60 mL/min/1.73 m^2    Anion Gap 9 8 - 16 mmol/L   Lithium level    Collection Time: 12/02/23  4:54 PM   Result Value Ref Range    Lithium Level 0.7 0.6 - 1.2 mmol/L   Comprehensive metabolic panel    Collection Time: 12/02/23  4:54 PM   Result Value Ref Range    Sodium 138 136 - 145 mmol/L    Potassium 2.8 (L) 3.5 - 5.1 mmol/L    Chloride 111 (H) 95 - 110 mmol/L    CO2 18 (L) 23 - 29 mmol/L    Glucose 154 (H) 70 - 110 mg/dL    BUN 6 6 - 20 mg/dL    Creatinine 0.7 0.5 - 1.4 mg/dL    Calcium 8.1 (L) 8.7 - 10.5 mg/dL    Total Protein 5.1 (L) 6.0 - 8.4 g/dL    Albumin 2.1 (L) 3.5 - 5.2 g/dL    Total Bilirubin 3.5 (H) 0.1 - 1.0 mg/dL    Alkaline Phosphatase 187 (H) 55 - 135 U/L     (H) 10 - 40 U/L     (H) 10 - 44 U/L    eGFR >60.0 >60 mL/min/1.73 m^2    Anion Gap 9 8 - 16 mmol/L   Magnesium    Collection Time: 12/03/23  5:38 AM   Result Value Ref Range    Magnesium 1.2 (L) 1.6 - 2.6 mg/dL   Phosphorus    Collection Time: 12/03/23  5:38 AM   Result Value Ref Range    Phosphorus 1.7 (L) 2.7 - 4.5 mg/dL     Imaging Results              X-Ray Pelvis Routine AP (Final result)  Result time 11/29/23 06:20:52      Final result by Mumtaz Penny MD (11/29/23 06:20:52)                   Impression:      Noting limitations above, no convincing evidence of acute displaced fracture or dislocation.      Electronically signed by: Mumtaz Penny  Date:    11/29/2023  Time:    06:20               Narrative:    EXAMINATION:  XR PELVIS ROUTINE AP    CLINICAL HISTORY:  fall;    TECHNIQUE:  AP view of  the pelvis was performed.    COMPARISON:  None.    FINDINGS:  Examination limited by suboptimal patient positioning and overlying bowel gas and stool.    Noting this, no definite evidence of acute displaced fracture or dislocation is identified.  Status post left total hip arthroplasty.  No definite hardware complication single provided view.    Partially imaged IVC filter.    Degenerative findings are noted involving the spine, sacroiliac joints, pubic symphysis, and right hip joint.  Moderate to large volume colonic stool partially imaged.    Phleboliths.    No definite radiopaque foreign body.                                       X-Ray Chest AP Portable (Final result)  Result time 11/29/23 05:29:50      Final result by Rangel Floyd MD (11/29/23 05:29:50)                   Impression:      As above.      Electronically signed by: Rangel Floyd MD  Date:    11/29/2023  Time:    05:29               Narrative:    EXAMINATION:  XR CHEST AP PORTABLE    CLINICAL HISTORY:  cough;    TECHNIQUE:  Single frontal view of the chest was performed.    COMPARISON:  09/16/2023    FINDINGS:  Cardiac monitoring leads overlie the chest.  The cardiac silhouette is not significantly enlarged.  There is stable mild elevation of the right hemidiaphragm.  There is probable subsegmental atelectasis or scarring at the right lung base.  The remaining lungs demonstrate chronic coarse interstitial attenuation.  No new large confluent airspace consolidation appreciated.  No significant volume of pleural fluid or pneumothorax identified.  The visualized osseous structures demonstrate mild degenerative changes.  Postsurgical changes are noted of the right upper abdomen as well as possible cholelithiasis.                                       CT Head Without Contrast (Final result)  Result time 11/29/23 04:28:42      Final result by Jered Mandujano MD (11/29/23 04:28:42)                   Impression:      No evidence of acute  intracranial abnormality.    Electronically signed by resident: Ce Vela  Date:    11/29/2023  Time:    04:19    Electronically signed by: Jered Mandujano MD  Date:    11/29/2023  Time:    04:28               Narrative:    EXAMINATION:  CT HEAD WITHOUT CONTRAST    CLINICAL HISTORY:  Head trauma, abnormal mental status (Age 19-64y);    TECHNIQUE:  Low dose axial CT images obtained throughout the head without the use of intravenous contrast.  Axial, sagittal and coronal reconstructions were performed.    COMPARISON:  CT 07/05/2023, 06/22/2023    FINDINGS:  Mild generalized cerebral volume loss with compensatory prominence of the ventricles and sulci.  There is patchy hypoattenuation through the supratentorial white matter which is nonspecific but may represent chronic microvascular ischemic changes. No parenchymal mass effect, midline shift, hemorrhage, edema or major vascular distribution infarct.    Ventricles and sulci are normal in size for age without evidence of hydrocephalus.    No extra-axial blood or fluid collections.    No displaced calvarial fracture.    Mild mucosal thickening of the paranasal sinuses.  The mastoid air cells are clear.

## 2023-12-03 NOTE — SUBJECTIVE & OBJECTIVE
Interval History: Patient more alert and conversant this am. Some confusion but answering some questions appropriately .     Review of Systems  Objective:     Vital Signs (Most Recent):  Temp: 96.4 °F (35.8 °C) (12/03/23 0816)  Pulse: 73 (12/03/23 1134)  Resp: 16 (12/03/23 0816)  BP: (!) 121/59 (12/03/23 0816)  SpO2: 96 % (12/03/23 0816) Vital Signs (24h Range):  Temp:  [96.4 °F (35.8 °C)-99.1 °F (37.3 °C)] 96.4 °F (35.8 °C)  Pulse:  [] 73  Resp:  [16] 16  SpO2:  [96 %-98 %] 96 %  BP: (118-128)/(58-61) 121/59     Weight: 58.5 kg (129 lb)  Body mass index is 23.59 kg/m².    Intake/Output Summary (Last 24 hours) at 12/3/2023 1220  Last data filed at 12/3/2023 1131  Gross per 24 hour   Intake 960 ml   Output 2426 ml   Net -1466 ml         Physical Exam  Vitals and nursing note reviewed.   Constitutional:       General: She is sleeping. She is not in acute distress.     Appearance: She is cachectic.   Cardiovascular:      Rate and Rhythm: Normal rate and regular rhythm.      Heart sounds: Normal heart sounds.   Pulmonary:      Effort: Pulmonary effort is normal. No respiratory distress.      Breath sounds: Normal breath sounds. No wheezing.   Abdominal:      General: Bowel sounds are normal. There is no distension.      Palpations: Abdomen is soft.      Tenderness: There is no abdominal tenderness.   Musculoskeletal:         General: No tenderness. Normal range of motion.      Cervical back: Normal range of motion and neck supple.   Lymphadenopathy:      Cervical: No cervical adenopathy.   Skin:     General: Skin is warm and dry.      Capillary Refill: Capillary refill takes less than 2 seconds.      Findings: Ecchymosis present. No rash.   Neurological:      Mental Status: She is disoriented.             Significant Labs: All pertinent labs within the past 24 hours have been reviewed.    Significant Imaging: I have reviewed all pertinent imaging results/findings within the past 24 hours.

## 2023-12-03 NOTE — PLAN OF CARE
Problem: Violence Risk or Actual  Goal: Anger and Impulse Control  Outcome: Ongoing, Progressing     Problem: Adult Inpatient Plan of Care  Goal: Plan of Care Review  Outcome: Ongoing, Progressing  Goal: Patient-Specific Goal (Individualized)  Outcome: Ongoing, Progressing  Goal: Absence of Hospital-Acquired Illness or Injury  Outcome: Ongoing, Progressing  Intervention: Identify and Manage Fall Risk  Flowsheets (Taken 12/3/2023 1025)  Safety Promotion/Fall Prevention:   assistive device/personal item within reach   medications reviewed   side rails raised x 2   /camera at bedside  Goal: Optimal Comfort and Wellbeing  Outcome: Ongoing, Progressing  Intervention: Monitor Pain and Promote Comfort  Flowsheets (Taken 12/3/2023 1025)  Pain Management Interventions: care clustered  Intervention: Provide Person-Centered Care  Flowsheets (Taken 12/3/2023 1025)  Trust Relationship/Rapport:   care explained   choices provided     Problem: Infection  Goal: Absence of Infection Signs and Symptoms  Outcome: Ongoing, Progressing  Intervention: Prevent or Manage Infection  Flowsheets (Taken 12/3/2023 1025)  Fever Reduction/Comfort Measures: lightweight bedding  Infection Management: aseptic technique maintained  Isolation Precautions:   protective   precautions maintained     Problem: Skin Injury Risk Increased  Goal: Skin Health and Integrity  Outcome: Ongoing, Progressing  Intervention: Optimize Skin Protection  Flowsheets (Taken 12/3/2023 1025)  Pressure Reduction Devices: positioning supports utilized  Head of Bed (HOB) Positioning: HOB elevated

## 2023-12-03 NOTE — PROGRESS NOTES
[facility] Medicine  Progress Note    Patient Name: Marely Hamilton  MRN: [id number]  Patient Class: IP- Inpatient   Admission Date: 11/29/2023  Length of Stay: 4 days  Attending Physician: Lavell Chatterjee*  Primary Care Provider: Viktor Ross MD        Subjective:     Principal Problem:Acute metabolic encephalopathy        HPI:  Patient is a 58-year-old woman with EtOH cirrhosis, bipolar disorder, anemia, thrombocytopenia, cholelithiasis, DVT with IVC filter in place, GI bleed, ESBL UTI history, seizure disorder, presenting from nursing [facility] with encephalopathy with unwitnessed fall. She was found down at her nursing home. Per chart review patient is normally A&O x3 1 month prior.  She does not take blood thinners. Patient denies any pain at this time. She reports she has been urinating frequently though difficult to ascertain history. She reports the last thing she remembers today before she arrived to the hospital was using the bathroom. She denies recent illness/fever/chills/nausea/vomiting/diarrhea/constipation.       Overview/Hospital Course:  In the ED, vitals stable. Intake labs remarkable for Na 146, BUN 37, Cr 1.4, Calcium 14.2 (corrected 15), , Tbili 4.8, , , Ammonia 51, lipase 69, lactic 2.3, UA with 67 WBCs. She was given 2L IVF, calcitonin, ceftriaxone and admitted for further management.    Interval History: Patient more alert and conversant this am. Some confusion but answering some questions appropriately .     Review of Systems  Objective:     Vital Signs (Most Recent):  Temp: 96.4 °F (35.8 °C) (12/03/23 0816)  Pulse: 73 (12/03/23 1134)  Resp: 16 (12/03/23 0816)  BP: (!) 121/59 (12/03/23 0816)  SpO2: 96 % (12/03/23 0816) Vital Signs (24h Range):  Temp:  [96.4 °F (35.8 °C)-99.1 °F (37.3 °C)] 96.4 °F (35.8 °C)  Pulse:  [] 73  Resp:  [16] 16  SpO2:  [96 %-98 %] 96 %  BP: (118-128)/(58-61) 121/59     Weight: 58.5 kg (129 lb)  Body mass  index is 23.59 kg/m².    Intake/Output Summary (Last 24 hours) at 12/3/2023 1220  Last data filed at 12/3/2023 1131  Gross per 24 hour   Intake 960 ml   Output 2426 ml   Net -1466 ml         Physical Exam  Vitals and nursing note reviewed.   Constitutional:       General: She is sleeping. She is not in acute distress.     Appearance: She is cachectic.   Cardiovascular:      Rate and Rhythm: Normal rate and regular rhythm.      Heart sounds: Normal heart sounds.   Pulmonary:      Effort: Pulmonary effort is normal. No respiratory distress.      Breath sounds: Normal breath sounds. No wheezing.   Abdominal:      General: Bowel sounds are normal. There is no distension.      Palpations: Abdomen is soft.      Tenderness: There is no abdominal tenderness.   Musculoskeletal:         General: No tenderness. Normal range of motion.      Cervical back: Normal range of motion and neck supple.   Lymphadenopathy:      Cervical: No cervical adenopathy.   Skin:     General: Skin is warm and dry.      Capillary Refill: Capillary refill takes less than 2 seconds.      Findings: Ecchymosis present. No rash.   Neurological:      Mental Status: She is disoriented.             Significant Labs: All pertinent labs within the past 24 hours have been reviewed.    Significant Imaging: I have reviewed all pertinent imaging results/findings within the past 24 hours.    Assessment/Plan:      * Acute metabolic encephalopathy  Acute cystitis  Hypercalcemia  Hepatic encephalopathy  Patient presents from NH with acute mentation change in the setting of hypercalcemia and UTI.  - continue lactulose and rifaximin  - continue ceftriaxone  - follow cultures  - hyperCa management as below      Body mass index (BMI) less than 19  - BMI 16.33  - dietary consulted      Fall  - CTH, XR pelvis unremarkable  - no bony tenderness      Hypomagnesemia  Patient has Abnormal Magnesium: hypomagnesemia. Will continue to monitor electrolytes closely. Will replace  the affected electrolytes and repeat labs to be done after interventions completed. The patient's magnesium results have been reviewed and are listed below.  Recent Labs   Lab 12/01/23  1210   MG 2.1        Presence of IVC filter  - history of bleeds  - not on AC      Hypercalcemia  The patient has hypercalcemia that is currently uncontrolled. The patient has the following symptoms due to their hypercalcemia: polydypsia and/or polyuria, weakness, and encephalopathy. The hypercalcemia is likely due to Hyperparathyroidism. We will obtain the following labs to work up the hypercalcemia: PTH   PTH, Intact   Date Value Ref Range Status   11/29/2023 115.1 (H) 9.0 - 77.0 pg/mL Final    . We will treat the hypercalcemia with: IV fluids and Calcitonin. Their latest calcium has been reviewed and is listed below.    Ionized Calcium   Date Value Ref Range Status   08/02/2023 0.90 (L) 1.06 - 1.42 mmol/L Final     - endocrine consulted and following    Hypernatremia  Patient has hypernatremia which is controlled. The hypernatremia is due to Dehydration. We will aim to correct the sodium by 8-10mEq in 24 hours. We will correct their hypernatremia with Select IV fluids: D5W/0.45 NaCl at a rate of 75 ml/hr. The patient's sodium results have been reviewed and are listed below.  Recent Labs   Lab 12/02/23  1136        Hold lasix and spironolactone    Bipolar 1 disorder, depressed, severe  - continue olanzapine 5mg qhs  - lithium level increased 1.6  - concern for recurrent hyperparathyroid hypercalcemia secondary to lithium  - psych consulted. Lithium dc. Repeat lithium level. Pt PEC per psych recommendations for concern for SI      Severe protein-calorie malnutrition  Nutrition consulted. Most recent weight and BMI monitored-     Measurements:  Wt Readings from Last 1 Encounters:   11/29/23 40.5 kg (89 lb 4.6 oz)   Body mass index is 16.33 kg/m².  - dietary consulted, history       Interventions/Recommendations (treatment  strategy):   - boost TID      Thrombocytopenia  Patient was found to have thrombocytopenia, the likely etiology is secondary to cirrhosis/portal hypertension, will monitor the platelets Daily. Will transfuse if platelet count is <10k. Hold DVT prophylaxis if platelets are <50k. The patient's platelet results have been reviewed and are listed below.  Recent Labs   Lab 11/29/23  0329   *         Cirrhosis, Capri's  Patient with known Cirrhosis. Co-morbidities are present and inclusive of hepatic encephalopathy and malnutrition.  MELD-Na score calculated; MELD 3.0: 22 at 12/1/2023 12:10 PM  MELD-Na: 19 at 12/1/2023 12:10 PM  Calculated from:  Serum Creatinine: 0.8 mg/dL (Using min of 1 mg/dL) at 12/1/2023 12:10 PM  Serum Sodium: 147 mmol/L (Using max of 137 mmol/L) at 12/1/2023 12:10 PM  Total Bilirubin: 4.4 mg/dL at 12/1/2023 12:10 PM  Serum Albumin: 2.3 g/dL at 12/1/2023 12:10 PM  INR(ratio): 1.9 at 11/29/2023  8:21 AM  Age at listing (hypothetical): 58 years  Sex: Female at 12/1/2023 12:10 PM      Continue chronic meds. Will avoid any hepatotoxic meds, and monitor CBC/CMP/INR for synthetic function.   Continue lactulose  Continue Rifaximin      VTE Risk Mitigation (From admission, onward)           Ordered     IP VTE HIGH RISK PATIENT  Once         11/29/23 0746                    Discharge Planning   BRAYAN: 12/2/2023     Code Status: Full Code   Is the patient medically ready for discharge?: No    Reason for patient still in hospital (select all that apply): Patient trending condition, Treatment, and Consult recommendations  Discharge Plan A: Return to nursing home        Lavell Chatterjee MD  Department of Hospital Medicine   [facility]

## 2023-12-03 NOTE — PLAN OF CARE
Problem: Violence Risk or Actual  Goal: Anger and Impulse Control  Outcome: Ongoing, Progressing  Intervention: Minimize Safety Risk  Flowsheets (Taken 12/2/2023 2346)  Behavior Management: behavioral plan developed  Sensory Stimulation Regulation: lighting decreased  Enhanced Safety Measures:   monitored by video    at bedside     Problem: Adult Inpatient Plan of Care  Goal: Plan of Care Review  Outcome: Ongoing, Progressing  Goal: Patient-Specific Goal (Individualized)  Outcome: Ongoing, Progressing  Goal: Absence of Hospital-Acquired Illness or Injury  Outcome: Ongoing, Progressing  Intervention: Identify and Manage Fall Risk  Flowsheets (Taken 12/2/2023 2346)  Safety Promotion/Fall Prevention:   side rails raised x 2   assistive device/personal item within reach  Intervention: Prevent Skin Injury  Flowsheets (Taken 12/2/2023 2346)  Body Position: turned  Skin Protection: skin sealant/moisture barrier applied  Goal: Optimal Comfort and Wellbeing  Outcome: Ongoing, Progressing  Intervention: Monitor Pain and Promote Comfort  Flowsheets (Taken 12/2/2023 2346)  Pain Management Interventions: care clustered  Goal: Readiness for Transition of Care  Outcome: Ongoing, Progressing     Problem: Infection  Goal: Absence of Infection Signs and Symptoms  Outcome: Ongoing, Progressing     Problem: Impaired Wound Healing  Goal: Optimal Wound Healing  Outcome: Ongoing, Progressing     Problem: Skin Injury Risk Increased  Goal: Skin Health and Integrity  Outcome: Ongoing, Progressing

## 2023-12-04 LAB
ALBUMIN SERPL BCP-MCNC: 2 G/DL (ref 3.5–5.2)
ALP SERPL-CCNC: 258 U/L (ref 55–135)
ALT SERPL W/O P-5'-P-CCNC: 106 U/L (ref 10–44)
ANION GAP SERPL CALC-SCNC: 5 MMOL/L (ref 8–16)
AST SERPL-CCNC: 141 U/L (ref 10–40)
BACTERIA BLD CULT: NORMAL
BACTERIA BLD CULT: NORMAL
BILIRUB SERPL-MCNC: 3.2 MG/DL (ref 0.1–1)
BUN SERPL-MCNC: 8 MG/DL (ref 6–20)
CALCIUM SERPL-MCNC: 7.6 MG/DL (ref 8.7–10.5)
CHLORIDE SERPL-SCNC: 107 MMOL/L (ref 95–110)
CO2 SERPL-SCNC: 22 MMOL/L (ref 23–29)
CREAT SERPL-MCNC: 0.7 MG/DL (ref 0.5–1.4)
EST. GFR  (NO RACE VARIABLE): >60 ML/MIN/1.73 M^2
GLUCOSE SERPL-MCNC: 120 MG/DL (ref 70–110)
MAGNESIUM SERPL-MCNC: 1.5 MG/DL (ref 1.6–2.6)
PHOSPHATE SERPL-MCNC: 1.4 MG/DL (ref 2.7–4.5)
POTASSIUM SERPL-SCNC: 4.8 MMOL/L (ref 3.5–5.1)
PROT SERPL-MCNC: 5 G/DL (ref 6–8.4)
SODIUM SERPL-SCNC: 134 MMOL/L (ref 136–145)

## 2023-12-04 PROCEDURE — 36415 COLL VENOUS BLD VENIPUNCTURE: CPT | Performed by: STUDENT IN AN ORGANIZED HEALTH CARE EDUCATION/TRAINING PROGRAM

## 2023-12-04 PROCEDURE — 63600175 PHARM REV CODE 636 W HCPCS: Performed by: STUDENT IN AN ORGANIZED HEALTH CARE EDUCATION/TRAINING PROGRAM

## 2023-12-04 PROCEDURE — 25000003 PHARM REV CODE 250: Performed by: STUDENT IN AN ORGANIZED HEALTH CARE EDUCATION/TRAINING PROGRAM

## 2023-12-04 PROCEDURE — 80053 COMPREHEN METABOLIC PANEL: CPT | Performed by: STUDENT IN AN ORGANIZED HEALTH CARE EDUCATION/TRAINING PROGRAM

## 2023-12-04 PROCEDURE — 83735 ASSAY OF MAGNESIUM: CPT | Performed by: STUDENT IN AN ORGANIZED HEALTH CARE EDUCATION/TRAINING PROGRAM

## 2023-12-04 PROCEDURE — 25000003 PHARM REV CODE 250: Performed by: PHYSICIAN ASSISTANT

## 2023-12-04 PROCEDURE — 84100 ASSAY OF PHOSPHORUS: CPT | Performed by: STUDENT IN AN ORGANIZED HEALTH CARE EDUCATION/TRAINING PROGRAM

## 2023-12-04 PROCEDURE — 21400001 HC TELEMETRY ROOM

## 2023-12-04 RX ORDER — SODIUM,POTASSIUM PHOSPHATES 280-250MG
2 POWDER IN PACKET (EA) ORAL
Status: COMPLETED | OUTPATIENT
Start: 2023-12-04 | End: 2023-12-05

## 2023-12-04 RX ORDER — MAGNESIUM SULFATE HEPTAHYDRATE 40 MG/ML
2 INJECTION, SOLUTION INTRAVENOUS ONCE
Status: COMPLETED | OUTPATIENT
Start: 2023-12-04 | End: 2023-12-04

## 2023-12-04 RX ADMIN — LACTULOSE 30 G: 20 SOLUTION ORAL at 03:12

## 2023-12-04 RX ADMIN — CINACALCET 30 MG: 30 TABLET, FILM COATED ORAL at 06:12

## 2023-12-04 RX ADMIN — RIFAXIMIN 550 MG: 550 TABLET ORAL at 08:12

## 2023-12-04 RX ADMIN — LEVETIRACETAM 1000 MG: 500 SOLUTION ORAL at 08:12

## 2023-12-04 RX ADMIN — MAGNESIUM SULFATE HEPTAHYDRATE 2 G: 40 INJECTION, SOLUTION INTRAVENOUS at 01:12

## 2023-12-04 RX ADMIN — CINACALCET 30 MG: 30 TABLET, FILM COATED ORAL at 05:12

## 2023-12-04 RX ADMIN — CHOLECALCIFEROL TAB 25 MCG (1000 UNIT) 2000 UNITS: 25 TAB at 08:12

## 2023-12-04 RX ADMIN — POTASSIUM & SODIUM PHOSPHATES POWDER PACK 280-160-250 MG 2 PACKET: 280-160-250 PACK at 05:12

## 2023-12-04 RX ADMIN — LACTULOSE 30 G: 20 SOLUTION ORAL at 08:12

## 2023-12-04 RX ADMIN — OLANZAPINE 5 MG: 2.5 TABLET, FILM COATED ORAL at 08:12

## 2023-12-04 RX ADMIN — PANTOPRAZOLE SODIUM 40 MG: 40 GRANULE, DELAYED RELEASE ORAL at 08:12

## 2023-12-04 RX ADMIN — POTASSIUM & SODIUM PHOSPHATES POWDER PACK 280-160-250 MG 2 PACKET: 280-160-250 PACK at 01:12

## 2023-12-04 RX ADMIN — FERROUS SULFATE TAB EC 325 MG (65 MG FE EQUIVALENT) 1 EACH: 325 (65 FE) TABLET DELAYED RESPONSE at 08:12

## 2023-12-04 NOTE — PROGRESS NOTES
[facility] Medicine  Progress Note    Patient Name: Marely Hamilton  MRN: [id number]  Patient Class: IP- Inpatient   Admission Date: 11/29/2023  Length of Stay: 5 days  Attending Physician: Lavell Chatterjee*  Primary Care Provider: Viktor Ross MD        Subjective:     Principal Problem:Acute metabolic encephalopathy        HPI:  Patient is a 58-year-old woman with EtOH cirrhosis, bipolar disorder, anemia, thrombocytopenia, cholelithiasis, DVT with IVC filter in place, GI bleed, ESBL UTI history, seizure disorder, presenting from nursing [facility] with encephalopathy with unwitnessed fall. She was found down at her nursing home. Per chart review patient is normally A&O x3 1 month prior.  She does not take blood thinners. Patient denies any pain at this time. She reports she has been urinating frequently though difficult to ascertain history. She reports the last thing she remembers today before she arrived to the hospital was using the bathroom. She denies recent illness/fever/chills/nausea/vomiting/diarrhea/constipation.       Overview/Hospital Course:  In the ED, vitals stable. Intake labs remarkable for Na 146, BUN 37, Cr 1.4, Calcium 14.2 (corrected 15), , Tbili 4.8, , , Ammonia 51, lipase 69, lactic 2.3, UA with 67 WBCs. She was given 2L IVF, calcitonin, ceftriaxone and admitted for further management.    Interval History: No acute events. Still with intermittent confusion    Review of Systems  Objective:     Vital Signs (Most Recent):  Temp: 98.7 °F (37.1 °C) (12/04/23 1106)  Pulse: 97 (12/04/23 1114)  Resp: 18 (12/04/23 1106)  BP: 121/67 (12/04/23 1106)  SpO2: 95 % (12/04/23 1106) Vital Signs (24h Range):  Temp:  [96.8 °F (36 °C)-99.2 °F (37.3 °C)] 98.7 °F (37.1 °C)  Pulse:  [] 97  Resp:  [16-18] 18  SpO2:  [90 %-98 %] 95 %  BP: (110-131)/(55-67) 121/67     Weight: 58.5 kg (129 lb)  Body mass index is 23.59 kg/m².    Intake/Output Summary (Last 24  hours) at 12/4/2023 1204  Last data filed at 12/4/2023 0600  Gross per 24 hour   Intake 475 ml   Output 900 ml   Net -425 ml         Physical Exam  Vitals and nursing note reviewed.   Constitutional:       General: She is sleeping. She is not in acute distress.     Appearance: She is cachectic.   Cardiovascular:      Rate and Rhythm: Normal rate and regular rhythm.      Heart sounds: Normal heart sounds.   Pulmonary:      Effort: Pulmonary effort is normal. No respiratory distress.      Breath sounds: Normal breath sounds. No wheezing.   Abdominal:      General: Bowel sounds are normal. There is no distension.      Palpations: Abdomen is soft.      Tenderness: There is no abdominal tenderness.   Musculoskeletal:         General: No tenderness. Normal range of motion.      Cervical back: Normal range of motion and neck supple.   Lymphadenopathy:      Cervical: No cervical adenopathy.   Skin:     General: Skin is warm and dry.      Capillary Refill: Capillary refill takes less than 2 seconds.      Findings: Ecchymosis present. No rash.   Neurological:      Mental Status: She is disoriented.             Significant Labs: All pertinent labs within the past 24 hours have been reviewed.    Significant Imaging: I have reviewed all pertinent imaging results/findings within the past 24 hours.    Assessment/Plan:      * Acute metabolic encephalopathy  Acute cystitis  Hypercalcemia  Hepatic encephalopathy  Patient presents from NH with acute mentation change in the setting of hypercalcemia and UTI.  - continue lactulose and rifaximin  - continue ceftriaxone  - follow cultures  - hyperCa management as below      Body mass index (BMI) less than 19  - BMI 16.33  - dietary consulted      Fall  - CTH, XR pelvis unremarkable  - no bony tenderness      Hypomagnesemia  Patient has Abnormal Magnesium: hypomagnesemia. Will continue to monitor electrolytes closely. Will replace the affected electrolytes and repeat labs to be done after  interventions completed. The patient's magnesium results have been reviewed and are listed below.  Recent Labs   Lab 12/01/23  1210   MG 2.1        Presence of IVC filter  - history of bleeds  - not on AC      Hypercalcemia  The patient has hypercalcemia that is currently uncontrolled. The patient has the following symptoms due to their hypercalcemia: polydypsia and/or polyuria, weakness, and encephalopathy. The hypercalcemia is likely due to Hyperparathyroidism. We will obtain the following labs to work up the hypercalcemia: PTH   PTH, Intact   Date Value Ref Range Status   11/29/2023 115.1 (H) 9.0 - 77.0 pg/mL Final    . We will treat the hypercalcemia with: IV fluids and Calcitonin. Their latest calcium has been reviewed and is listed below.    Ionized Calcium   Date Value Ref Range Status   08/02/2023 0.90 (L) 1.06 - 1.42 mmol/L Final     - endocrine consulted and following    Hypernatremia  Patient has hypernatremia which is controlled. The hypernatremia is due to Dehydration. We will aim to correct the sodium by 8-10mEq in 24 hours. We will correct their hypernatremia with Select IV fluids: D5W/0.45 NaCl at a rate of 75 ml/hr. The patient's sodium results have been reviewed and are listed below.  Recent Labs   Lab 12/02/23  1136        Hold lasix and spironolactone    Bipolar 1 disorder, depressed, severe  - continue olanzapine 5mg qhs  - lithium level increased 1.6  - concern for recurrent hyperparathyroid hypercalcemia secondary to lithium  - psych consulted. Lithium dc. Repeat lithium level. Pt PEC per psych recommendations for concern for SI      Severe protein-calorie malnutrition  Nutrition consulted. Most recent weight and BMI monitored-     Measurements:  Wt Readings from Last 1 Encounters:   11/29/23 40.5 kg (89 lb 4.6 oz)   Body mass index is 16.33 kg/m².  - dietary consulted, history       Interventions/Recommendations (treatment strategy):   - boost TID      Thrombocytopenia  Patient was  found to have thrombocytopenia, the likely etiology is secondary to cirrhosis/portal hypertension, will monitor the platelets Daily. Will transfuse if platelet count is <10k. Hold DVT prophylaxis if platelets are <50k. The patient's platelet results have been reviewed and are listed below.  Recent Labs   Lab 11/29/23  0329   *         Cirrhosis, Capri's  Patient with known Cirrhosis. Co-morbidities are present and inclusive of hepatic encephalopathy and malnutrition.  MELD-Na score calculated; MELD 3.0: 22 at 12/1/2023 12:10 PM  MELD-Na: 19 at 12/1/2023 12:10 PM  Calculated from:  Serum Creatinine: 0.8 mg/dL (Using min of 1 mg/dL) at 12/1/2023 12:10 PM  Serum Sodium: 147 mmol/L (Using max of 137 mmol/L) at 12/1/2023 12:10 PM  Total Bilirubin: 4.4 mg/dL at 12/1/2023 12:10 PM  Serum Albumin: 2.3 g/dL at 12/1/2023 12:10 PM  INR(ratio): 1.9 at 11/29/2023  8:21 AM  Age at listing (hypothetical): 58 years  Sex: Female at 12/1/2023 12:10 PM      Continue chronic meds. Will avoid any hepatotoxic meds, and monitor CBC/CMP/INR for synthetic function.   Continue lactulose  Continue Rifaximin      VTE Risk Mitigation (From admission, onward)           Ordered     IP VTE HIGH RISK PATIENT  Once         11/29/23 0746                    Discharge Planning   BRAYAN: 12/5/2023     Code Status: Full Code   Is the patient medically ready for discharge?: No    Reason for patient still in hospital (select all that apply): Patient trending condition, Treatment, and Consult recommendations  Discharge Plan A: Return to nursing home          Lavell Chatterjee MD  Department of Hospital Medicine   [facility] Surg

## 2023-12-04 NOTE — PLAN OF CARE
Problem: Violence Risk or Actual  Goal: Anger and Impulse Control  Outcome: Ongoing, Progressing  Intervention: Minimize Safety Risk  Flowsheets (Taken 12/4/2023 0939)  Behavior Management:   behavioral plan reviewed   boundaries reinforced  Sensory Stimulation Regulation: care clustered  Enhanced Safety Measures:   monitored by video    at bedside     Problem: Adult Inpatient Plan of Care  Goal: Plan of Care Review  Outcome: Ongoing, Progressing  Goal: Patient-Specific Goal (Individualized)  Outcome: Ongoing, Progressing  Goal: Absence of Hospital-Acquired Illness or Injury  Outcome: Ongoing, Progressing  Intervention: Identify and Manage Fall Risk  Flowsheets (Taken 12/4/2023 0939)  Safety Promotion/Fall Prevention:   assistive device/personal item within reach   medications reviewed   side rails raised x 2  Intervention: Prevent and Manage VTE (Venous Thromboembolism) Risk  Flowsheets (Taken 12/4/2023 0939)  Activity Management: Rolling - L1  VTE Prevention/Management:   bleeding precations maintained   bleeding risk assessed   ROM (active) performed  Range of Motion: active ROM (range of motion) encouraged  Goal: Optimal Comfort and Wellbeing  Outcome: Ongoing, Progressing  Intervention: Monitor Pain and Promote Comfort  Flowsheets (Taken 12/4/2023 0939)  Pain Management Interventions:   care clustered   pain management plan reviewed with patient/caregiver  Intervention: Provide Person-Centered Care  Flowsheets (Taken 12/4/2023 0939)  Trust Relationship/Rapport:   care explained   choices provided     Problem: Infection  Goal: Absence of Infection Signs and Symptoms  Outcome: Ongoing, Progressing  Intervention: Prevent or Manage Infection  Flowsheets (Taken 12/4/2023 0939)  Isolation Precautions:   protective   precautions maintained

## 2023-12-04 NOTE — SUBJECTIVE & OBJECTIVE
Interval History: No acute events. Still with intermittent confusion    Review of Systems  Objective:     Vital Signs (Most Recent):  Temp: 98.7 °F (37.1 °C) (12/04/23 1106)  Pulse: 97 (12/04/23 1114)  Resp: 18 (12/04/23 1106)  BP: 121/67 (12/04/23 1106)  SpO2: 95 % (12/04/23 1106) Vital Signs (24h Range):  Temp:  [96.8 °F (36 °C)-99.2 °F (37.3 °C)] 98.7 °F (37.1 °C)  Pulse:  [] 97  Resp:  [16-18] 18  SpO2:  [90 %-98 %] 95 %  BP: (110-131)/(55-67) 121/67     Weight: 58.5 kg (129 lb)  Body mass index is 23.59 kg/m².    Intake/Output Summary (Last 24 hours) at 12/4/2023 1204  Last data filed at 12/4/2023 0600  Gross per 24 hour   Intake 475 ml   Output 900 ml   Net -425 ml         Physical Exam  Vitals and nursing note reviewed.   Constitutional:       General: She is sleeping. She is not in acute distress.     Appearance: She is cachectic.   Cardiovascular:      Rate and Rhythm: Normal rate and regular rhythm.      Heart sounds: Normal heart sounds.   Pulmonary:      Effort: Pulmonary effort is normal. No respiratory distress.      Breath sounds: Normal breath sounds. No wheezing.   Abdominal:      General: Bowel sounds are normal. There is no distension.      Palpations: Abdomen is soft.      Tenderness: There is no abdominal tenderness.   Musculoskeletal:         General: No tenderness. Normal range of motion.      Cervical back: Normal range of motion and neck supple.   Lymphadenopathy:      Cervical: No cervical adenopathy.   Skin:     General: Skin is warm and dry.      Capillary Refill: Capillary refill takes less than 2 seconds.      Findings: Ecchymosis present. No rash.   Neurological:      Mental Status: She is disoriented.             Significant Labs: All pertinent labs within the past 24 hours have been reviewed.    Significant Imaging: I have reviewed all pertinent imaging results/findings within the past 24 hours.

## 2023-12-04 NOTE — PROGRESS NOTES
"CONSULTATION LIAISON PSYCHIATRY PROGRESS NOTE    Patient Name: Marely Hamilton  MRN: [id number]  Patient Class: IP- Inpatient  Admission Date: 11/29/2023  Attending Physician: Lavell Chatterjee*      SUBJECTIVE:   Marely Hamilton is a 58 y.o. female with past psychiatric history of bipolar disorder, alcohol use disorder & past pertinent medical history of seizure disorder, alcohol induced hepatic cirrhosis presents to the ED/admitted to the hospital for AMS in setting of recent unwitnessed fall at nursing home ([facility]). [facility] Psychiatry consulted for agitation/AMS.      No acute events overnight. Pt compliant with scheduled medications, including Zyprexa 5mg. No PRNs required for non-redirectable agitation overnight or this morning. Labs reviewed. Lithium level normalized, AYESHA resolved. Completed CTX regimen for UTI.   This morning, patient remains altered in mentation. Reports mood is "good," denies SI.   When asked how she feels, she states several times that she is still confused, though reports "It's getting better." Oriented to place, city, year, day of week, situation, but not month. Initially reports living in a house in [place], then later acknowledges living at [facility]. Concentration is poor, unable to complete any MSE tests of concentration (WORLD forwards/reverse, months of year in reverse, [facility]).   CAM-B positive.     OBJECTIVE:    Mental Status Exam:  General Appearance: appears stated age, well developed and nourished, adequately groomed and appropriately dressed, in no acute distress  Behavior: agitated; uncooperative; appropriate eye-contact; under good behavioral control  Involuntary Movements and Motor Activity: no abnormal involuntary movements noted; no tics, no tremors, no akathisia, no dystonia, no evidence of tardive dyskinesia; no psychomotor agitation or retardation  Gait and Station: unable to assess - patient lying down or seated  Speech and Language: lowed, profane  Mood: " ""Better.. I slept well"  Affect: dysphoric   Thought Process and Associations: linear, goal-directed, abstract (proverbs & similarities)  Thought Content and Perceptions:: no suicidal ideation, no homicidal ideation, no auditory hallucinations, no visual hallucinations  Sensorium and Orientation: oriented fully (to person, place, and year, day of week but not month  Recent and Remote Memory: grossly intact  Attention and Concentration: Poor, fluctuant (failed SAVEAHAART, WORLD, Months in reverse)  Fund of Knowledge: Not assessed  Insight: limited  Judgment: limited    CAM ICU positive? no      ASSESSMENT & RECOMMENDATIONS   -Delirium   -Bipolar 1 Disorder     PSYCH MEDICATIONS  Scheduled:   Recommend hold Lithium 600 mg nightly and continuing Zyprexa 5 mg QHS  PRN haldol 2mg PO/IV q8 hrs PRN for non-redirectable agitation. If she requires a PRN please obtain EKG and monitor for Qtc changes   EKG 11/29 Qtc: 389  Lithium Hx:  Li level 0.7 on 12/2/23  Li level 1.6 on 11/29/23  Li level: 0.3 on 10/16/23  Li level: 0.1 on 10/10/23    DELIRIUM  DELIRIUM BEHAVIOR MANAGEMENT  PLEASE utilize  PRN meds first for agitation. Minimize use of PHYSICAL restraints OR have periods of being out of physical restraints if possible.  Keep window shades open and room lit during day and room dim at night in order to promote normal sleep-wake cycles  Encourage family at bedside. [place] patient often to situation, location, date.  Continue to Limit or Discontinue use of Narcotics, Benzos and Anti-cholinergic medications as they may worsen delirium.  Continue medical workup for causative etiology of Delirium.      RISK ASSESSMENT  NO NEED FOR PEC patient NOT in any imminent danger of hurting self or others and not gravely disabled; patient is actively delirious and therefore assumed to lack decision making capacity to leave AMA  However patient requiring 24 hour care at baseline per patients family, if patient were to decide to AMA prior to " adequate dispo being arranged, recommend low threshold for PEC.      FOLLOW UP  Will follow up while in house     DISPOSITION - once medically cleared:   Defer to medical team. Patient previously living at [facility]. Patients sister Zaria ([phone]) wishes to updated prior to discharge    Please contact ON CALL psychiatry service (24/7) for any acute issues that may arise.    Dr. Trent Matthew Wiedemann  [facility] PGY2  [facility] Psychiatry  [facility]  12/4/2023 7:15 AM        --------------------------------------------------------------------------------------------------------------------------------------------------------------------------------------------------------------------------------------    CONTINUED OBJECTIVE clinical data & findings reviewed and noted for above decision making    Current Medications:   Scheduled Meds:    cinacalcet  30 mg Oral BID WM    ferrous sulfate  1 tablet Oral Daily    lactulose  30 g Oral TID    levetiracetam  1,000 mg Oral BID    OLANZapine  5 mg Oral QHS    pantoprazole  40 mg Oral BID    rifAXIMin  550 mg Oral BID    vitamin D  2,000 Units Oral Daily     PRN Meds: acetaminophen, aluminum-magnesium hydroxide-simethicone, bisacodyL, melatonin, ondansetron, polyethylene glycol, simethicone, sodium chloride 0.9%    Allergies:   Review of patient's allergies indicates:   Allergen Reactions    Sulfa (sulfonamide antibiotics) Rash    Codeine Nausea And Vomiting       Vitals  Vitals:    12/04/23 0714   BP: (!) 131/55   Pulse: 95   Resp: 18   Temp: 98.5 °F (36.9 °C)       Labs/Imaging/Studies:  Recent Results (from the past 24 hour(s))   Magnesium    Collection Time: 12/04/23  4:39 AM   Result Value Ref Range    Magnesium 1.5 (L) 1.6 - 2.6 mg/dL   Phosphorus    Collection Time: 12/04/23  4:39 AM   Result Value Ref Range    Phosphorus 1.4 (L) 2.7 - 4.5 mg/dL   Comprehensive Metabolic Panel    Collection Time: 12/04/23  4:39 AM   Result Value Ref  Range    Sodium 134 (L) 136 - 145 mmol/L    Potassium 4.8 3.5 - 5.1 mmol/L    Chloride 107 95 - 110 mmol/L    CO2 22 (L) 23 - 29 mmol/L    Glucose 120 (H) 70 - 110 mg/dL    BUN 8 6 - 20 mg/dL    Creatinine 0.7 0.5 - 1.4 mg/dL    Calcium 7.6 (L) 8.7 - 10.5 mg/dL    Total Protein 5.0 (L) 6.0 - 8.4 g/dL    Albumin 2.0 (L) 3.5 - 5.2 g/dL    Total Bilirubin 3.2 (H) 0.1 - 1.0 mg/dL    Alkaline Phosphatase 258 (H) 55 - 135 U/L     (H) 10 - 40 U/L     (H) 10 - 44 U/L    eGFR >60.0 >60 mL/min/1.73 m^2    Anion Gap 5 (L) 8 - 16 mmol/L     Imaging Results              X-Ray Pelvis Routine AP (Final result)  Result time 11/29/23 06:20:52      Final result by Mumtaz Penny MD (11/29/23 06:20:52)                   Impression:      Noting limitations above, no convincing evidence of acute displaced fracture or dislocation.      Electronically signed by: Mumtaz Penny  Date:    11/29/2023  Time:    06:20               Narrative:    EXAMINATION:  XR PELVIS ROUTINE AP    CLINICAL HISTORY:  fall;    TECHNIQUE:  AP view of the pelvis was performed.    COMPARISON:  None.    FINDINGS:  Examination limited by suboptimal patient positioning and overlying bowel gas and stool.    Noting this, no definite evidence of acute displaced fracture or dislocation is identified.  Status post left total hip arthroplasty.  No definite hardware complication single provided view.    Partially imaged IVC filter.    Degenerative findings are noted involving the spine, sacroiliac joints, pubic symphysis, and right hip joint.  Moderate to large volume colonic stool partially imaged.    Phleboliths.    No definite radiopaque foreign body.                                       X-Ray Chest AP Portable (Final result)  Result time 11/29/23 05:29:50      Final result by Rangel Floyd MD (11/29/23 05:29:50)                   Impression:      As above.      Electronically signed by: Rangel Floyd MD  Date:    11/29/2023  Time:    05:29                Narrative:    EXAMINATION:  XR CHEST AP PORTABLE    CLINICAL HISTORY:  cough;    TECHNIQUE:  Single frontal view of the chest was performed.    COMPARISON:  09/16/2023    FINDINGS:  Cardiac monitoring leads overlie the chest.  The cardiac silhouette is not significantly enlarged.  There is stable mild elevation of the right hemidiaphragm.  There is probable subsegmental atelectasis or scarring at the right lung base.  The remaining lungs demonstrate chronic coarse interstitial attenuation.  No new large confluent airspace consolidation appreciated.  No significant volume of pleural fluid or pneumothorax identified.  The visualized osseous structures demonstrate mild degenerative changes.  Postsurgical changes are noted of the right upper abdomen as well as possible cholelithiasis.                                       CT Head Without Contrast (Final result)  Result time 11/29/23 04:28:42      Final result by Jered Mandujano MD (11/29/23 04:28:42)                   Impression:      No evidence of acute intracranial abnormality.    Electronically signed by resident: Ce Vela  Date:    11/29/2023  Time:    04:19    Electronically signed by: Jered Mandujano MD  Date:    11/29/2023  Time:    04:28               Narrative:    EXAMINATION:  CT HEAD WITHOUT CONTRAST    CLINICAL HISTORY:  Head trauma, abnormal mental status (Age 19-64y);    TECHNIQUE:  Low dose axial CT images obtained throughout the head without the use of intravenous contrast.  Axial, sagittal and coronal reconstructions were performed.    COMPARISON:  CT 07/05/2023, 06/22/2023    FINDINGS:  Mild generalized cerebral volume loss with compensatory prominence of the ventricles and sulci.  There is patchy hypoattenuation through the supratentorial white matter which is nonspecific but may represent chronic microvascular ischemic changes. No parenchymal mass effect, midline shift, hemorrhage, edema or major vascular distribution  infarct.    Ventricles and sulci are normal in size for age without evidence of hydrocephalus.    No extra-axial blood or fluid collections.    No displaced calvarial fracture.    Mild mucosal thickening of the paranasal sinuses.  The mastoid air cells are clear.

## 2023-12-05 LAB
ALBUMIN SERPL BCP-MCNC: 2 G/DL (ref 3.5–5.2)
ALP SERPL-CCNC: 207 U/L (ref 55–135)
ALT SERPL W/O P-5'-P-CCNC: 84 U/L (ref 10–44)
ANION GAP SERPL CALC-SCNC: 8 MMOL/L (ref 8–16)
AST SERPL-CCNC: 103 U/L (ref 10–40)
BASOPHILS # BLD AUTO: 0.03 K/UL (ref 0–0.2)
BASOPHILS NFR BLD: 0.7 % (ref 0–1.9)
BILIRUB SERPL-MCNC: 4.3 MG/DL (ref 0.1–1)
BUN SERPL-MCNC: 8 MG/DL (ref 6–20)
CA-I BLDV-SCNC: 1 MMOL/L (ref 1.06–1.42)
CALCIUM SERPL-MCNC: 6.8 MG/DL (ref 8.7–10.5)
CHLORIDE SERPL-SCNC: 105 MMOL/L (ref 95–110)
CO2 SERPL-SCNC: 20 MMOL/L (ref 23–29)
CREAT SERPL-MCNC: 0.6 MG/DL (ref 0.5–1.4)
DIFFERENTIAL METHOD: ABNORMAL
EOSINOPHIL # BLD AUTO: 0.1 K/UL (ref 0–0.5)
EOSINOPHIL NFR BLD: 2.7 % (ref 0–8)
ERYTHROCYTE [DISTWIDTH] IN BLOOD BY AUTOMATED COUNT: 17.2 % (ref 11.5–14.5)
EST. GFR  (NO RACE VARIABLE): >60 ML/MIN/1.73 M^2
GLUCOSE SERPL-MCNC: 168 MG/DL (ref 70–110)
HCT VFR BLD AUTO: 28.1 % (ref 37–48.5)
HGB BLD-MCNC: 9.2 G/DL (ref 12–16)
IMM GRANULOCYTES # BLD AUTO: 0.02 K/UL (ref 0–0.04)
IMM GRANULOCYTES NFR BLD AUTO: 0.5 % (ref 0–0.5)
LYMPHOCYTES # BLD AUTO: 0.6 K/UL (ref 1–4.8)
LYMPHOCYTES NFR BLD: 13.8 % (ref 18–48)
MAGNESIUM SERPL-MCNC: 1.3 MG/DL (ref 1.6–2.6)
MCH RBC QN AUTO: 36.4 PG (ref 27–31)
MCHC RBC AUTO-ENTMCNC: 32.7 G/DL (ref 32–36)
MCV RBC AUTO: 111 FL (ref 82–98)
MONOCYTES # BLD AUTO: 0.4 K/UL (ref 0.3–1)
MONOCYTES NFR BLD: 9.4 % (ref 4–15)
NEUTROPHILS # BLD AUTO: 3 K/UL (ref 1.8–7.7)
NEUTROPHILS NFR BLD: 72.9 % (ref 38–73)
NRBC BLD-RTO: 0 /100 WBC
PHOSPHATE SERPL-MCNC: 3 MG/DL (ref 2.7–4.5)
PLATELET # BLD AUTO: 50 K/UL (ref 150–450)
PMV BLD AUTO: 10 FL (ref 9.2–12.9)
POTASSIUM SERPL-SCNC: 4.3 MMOL/L (ref 3.5–5.1)
PROT SERPL-MCNC: 4.7 G/DL (ref 6–8.4)
RBC # BLD AUTO: 2.53 M/UL (ref 4–5.4)
SODIUM SERPL-SCNC: 133 MMOL/L (ref 136–145)
WBC # BLD AUTO: 4.13 K/UL (ref 3.9–12.7)

## 2023-12-05 PROCEDURE — 25000003 PHARM REV CODE 250: Performed by: PHYSICIAN ASSISTANT

## 2023-12-05 PROCEDURE — 21400001 HC TELEMETRY ROOM

## 2023-12-05 PROCEDURE — 25000003 PHARM REV CODE 250: Performed by: STUDENT IN AN ORGANIZED HEALTH CARE EDUCATION/TRAINING PROGRAM

## 2023-12-05 PROCEDURE — 36415 COLL VENOUS BLD VENIPUNCTURE: CPT | Performed by: STUDENT IN AN ORGANIZED HEALTH CARE EDUCATION/TRAINING PROGRAM

## 2023-12-05 PROCEDURE — 80053 COMPREHEN METABOLIC PANEL: CPT | Performed by: STUDENT IN AN ORGANIZED HEALTH CARE EDUCATION/TRAINING PROGRAM

## 2023-12-05 PROCEDURE — 63600175 PHARM REV CODE 636 W HCPCS: Performed by: STUDENT IN AN ORGANIZED HEALTH CARE EDUCATION/TRAINING PROGRAM

## 2023-12-05 PROCEDURE — 84100 ASSAY OF PHOSPHORUS: CPT | Performed by: STUDENT IN AN ORGANIZED HEALTH CARE EDUCATION/TRAINING PROGRAM

## 2023-12-05 PROCEDURE — 82330 ASSAY OF CALCIUM: CPT | Performed by: STUDENT IN AN ORGANIZED HEALTH CARE EDUCATION/TRAINING PROGRAM

## 2023-12-05 PROCEDURE — 83735 ASSAY OF MAGNESIUM: CPT | Performed by: STUDENT IN AN ORGANIZED HEALTH CARE EDUCATION/TRAINING PROGRAM

## 2023-12-05 PROCEDURE — 85025 COMPLETE CBC W/AUTO DIFF WBC: CPT | Performed by: STUDENT IN AN ORGANIZED HEALTH CARE EDUCATION/TRAINING PROGRAM

## 2023-12-05 RX ORDER — MAGNESIUM SULFATE HEPTAHYDRATE 40 MG/ML
2 INJECTION, SOLUTION INTRAVENOUS ONCE
Status: COMPLETED | OUTPATIENT
Start: 2023-12-05 | End: 2023-12-05

## 2023-12-05 RX ORDER — LITHIUM CARBONATE 300 MG/1
300 TABLET, FILM COATED, EXTENDED RELEASE ORAL NIGHTLY
Status: DISCONTINUED | OUTPATIENT
Start: 2023-12-05 | End: 2023-12-08 | Stop reason: HOSPADM

## 2023-12-05 RX ADMIN — RIFAXIMIN 550 MG: 550 TABLET ORAL at 08:12

## 2023-12-05 RX ADMIN — OLANZAPINE 5 MG: 2.5 TABLET, FILM COATED ORAL at 08:12

## 2023-12-05 RX ADMIN — LACTULOSE 30 G: 20 SOLUTION ORAL at 08:12

## 2023-12-05 RX ADMIN — CHOLECALCIFEROL TAB 25 MCG (1000 UNIT) 2000 UNITS: 25 TAB at 08:12

## 2023-12-05 RX ADMIN — FERROUS SULFATE TAB EC 325 MG (65 MG FE EQUIVALENT) 1 EACH: 325 (65 FE) TABLET DELAYED RESPONSE at 08:12

## 2023-12-05 RX ADMIN — LACTULOSE 30 G: 20 SOLUTION ORAL at 04:12

## 2023-12-05 RX ADMIN — PANTOPRAZOLE SODIUM 40 MG: 40 GRANULE, DELAYED RELEASE ORAL at 08:12

## 2023-12-05 RX ADMIN — LITHIUM CARBONATE 300 MG: 300 TABLET, FILM COATED, EXTENDED RELEASE ORAL at 08:12

## 2023-12-05 RX ADMIN — CINACALCET 30 MG: 30 TABLET, FILM COATED ORAL at 08:12

## 2023-12-05 RX ADMIN — MAGNESIUM SULFATE HEPTAHYDRATE 2 G: 40 INJECTION, SOLUTION INTRAVENOUS at 08:12

## 2023-12-05 RX ADMIN — LEVETIRACETAM 1000 MG: 500 SOLUTION ORAL at 08:12

## 2023-12-05 RX ADMIN — POTASSIUM & SODIUM PHOSPHATES POWDER PACK 280-160-250 MG 2 PACKET: 280-160-250 PACK at 08:12

## 2023-12-05 NOTE — PLAN OF CARE
[facility] - Med Surg  Discharge Reassessment    Primary Care Provider: Viktor Ross MD    Expected Discharge Date: 12/5/2023      Patient remains inpatient due to continued need for medical management. Patient continues to be PEC'd. Patient also requiring electrolyte replacement. Will continue to follow for post acute needs. Once medically ready, patient will return to [facility].   Discharge Plan A and Plan B have been determined by review of patient's clinical status, future medical and therapeutic needs, and coverage/benefits for post-acute care in coordination with multidisciplinary team members.  Reassessment (most recent)       Discharge Reassessment - 12/05/23 1404          Discharge Reassessment    Assessment Type Discharge Planning Reassessment (P)      Did the patient's condition or plan change since previous assessment? Yes (P)      Discharge Plan discussed with: Sibling (P)      Communicated BRAYAN with patient/caregiver Date not available/Unable to determine (P)      Discharge Plan A Return to nursing home (P)      Discharge Plan B Return to Nursing Home (P)      DME Needed Upon Discharge  none (P)      Transition of Care Barriers None (P)      Why the patient remains in the hospital Requires continued medical care (P)         Post-Acute Status    Discharge Delays None known at this time (P)                        NORMA Blanton  Case Management  [phone]

## 2023-12-05 NOTE — PLAN OF CARE
Pt able to answer questions and make needs know. Pt able to take medications without difficulty. Pt refused Lactolose medication. Pt is NSR, at 78 on monitor.   Sitter present at bedside. Pt oriented to place,time and situation.

## 2023-12-05 NOTE — PROGRESS NOTES
[facility] Medicine  Progress Note    Patient Name: Marely Hamilton  MRN: [id number]  Patient Class: IP- Inpatient   Admission Date: 11/29/2023  Length of Stay: 6 days  Attending Physician: Lavell Chatterjee*  Primary Care Provider: Viktor Ross MD        Subjective:     Principal Problem:Acute metabolic encephalopathy        HPI:  Patient is a 58-year-old woman with EtOH cirrhosis, bipolar disorder, anemia, thrombocytopenia, cholelithiasis, DVT with IVC filter in place, GI bleed, ESBL UTI history, seizure disorder, presenting from nursing [facility] with encephalopathy with unwitnessed fall. She was found down at her nursing home. Per chart review patient is normally A&O x3 1 month prior.  She does not take blood thinners. Patient denies any pain at this time. She reports she has been urinating frequently though difficult to ascertain history. She reports the last thing she remembers today before she arrived to the hospital was using the bathroom. She denies recent illness/fever/chills/nausea/vomiting/diarrhea/constipation.       Overview/Hospital Course:  In the ED, vitals stable. Intake labs remarkable for Na 146, BUN 37, Cr 1.4, Calcium 14.2 (corrected 15), , Tbili 4.8, , , Ammonia 51, lipase 69, lactic 2.3, UA with 67 WBCs. She was given 2L IVF, calcitonin, ceftriaxone and admitted for further management. Continued on lactulose for treatment of HE, rocephin for treatment of UTI. She was started on IVF and calcitonin. Endocrine consulted for hypercalcemia. Hyperparathyroid hypercalcemia. Lithium level elevated. Psychiatry consulted and lithium dc. Calcium improved.     Interval History: No acute events, pt improving. Called patient sister and discussed plan of care. Sensipar held for hypocalcemia.     Plan to re-initiate lithium at lower dose tonight per psychiatry.     Review of Systems  Objective:     Vital Signs (Most Recent):  Temp: 99 °F (37.2 °C) (12/05/23  1101)  Pulse: 90 (12/05/23 1455)  Resp: 18 (12/05/23 1101)  BP: (!) 120/57 (12/05/23 1101)  SpO2: 96 % (12/05/23 1101) Vital Signs (24h Range):  Temp:  [97 °F (36.1 °C)-99 °F (37.2 °C)] 99 °F (37.2 °C)  Pulse:  [81-98] 90  Resp:  [14-18] 18  SpO2:  [95 %-96 %] 96 %  BP: (105-143)/(56-75) 120/57     Weight: 58.5 kg (129 lb)  Body mass index is 23.59 kg/m².  No intake or output data in the 24 hours ending 12/05/23 1610      Physical Exam  Vitals and nursing note reviewed.   Constitutional:       General: She is sleeping. She is not in acute distress.     Appearance: She is cachectic.   Cardiovascular:      Rate and Rhythm: Normal rate and regular rhythm.      Heart sounds: Normal heart sounds.   Pulmonary:      Effort: Pulmonary effort is normal. No respiratory distress.      Breath sounds: Normal breath sounds. No wheezing.   Abdominal:      General: Bowel sounds are normal. There is no distension.      Palpations: Abdomen is soft.      Tenderness: There is no abdominal tenderness.   Musculoskeletal:         General: No tenderness. Normal range of motion.      Cervical back: Normal range of motion and neck supple.   Lymphadenopathy:      Cervical: No cervical adenopathy.   Skin:     General: Skin is warm and dry.      Capillary Refill: Capillary refill takes less than 2 seconds.      Findings: Ecchymosis present. No rash.   Neurological:      Mental Status: She is disoriented.             Significant Labs: All pertinent labs within the past 24 hours have been reviewed.    Significant Imaging: I have reviewed all pertinent imaging results/findings within the past 24 hours.    Assessment/Plan:      * Acute metabolic encephalopathy  Acute cystitis  Hypercalcemia  Hepatic encephalopathy  Patient presents from NH with acute mentation change in the setting of hypercalcemia and UTI.  - continue lactulose and rifaximin  - continue ceftriaxone  - follow cultures  - hyperCa management as below      Body mass index (BMI) less  than 19  - BMI 16.33  - dietary consulted      Fall  - CTH, XR pelvis unremarkable  - no bony tenderness      Hypomagnesemia  Patient has Abnormal Magnesium: hypomagnesemia. Will continue to monitor electrolytes closely. Will replace the affected electrolytes and repeat labs to be done after interventions completed. The patient's magnesium results have been reviewed and are listed below.  Recent Labs   Lab 12/01/23  1210   MG 2.1        Presence of IVC filter  - history of bleeds  - not on AC      Hypercalcemia  The patient has hypercalcemia that is currently uncontrolled. The patient has the following symptoms due to their hypercalcemia: polydypsia and/or polyuria, weakness, and encephalopathy. The hypercalcemia is likely due to Hyperparathyroidism. We will obtain the following labs to work up the hypercalcemia: PTH   PTH, Intact   Date Value Ref Range Status   11/29/2023 115.1 (H) 9.0 - 77.0 pg/mL Final    . We will treat the hypercalcemia with: IV fluids and Calcitonin. Their latest calcium has been reviewed and is listed below.    Ionized Calcium   Date Value Ref Range Status   12/05/2023 1.00 (L) 1.06 - 1.42 mmol/L Final     - endocrine consulted and following  - started on sensipar    Hypernatremia  Patient has hypernatremia which is controlled. The hypernatremia is due to Dehydration. We will aim to correct the sodium by 8-10mEq in 24 hours. We will correct their hypernatremia with Select IV fluids: D5W/0.45 NaCl at a rate of 75 ml/hr. The patient's sodium results have been reviewed and are listed below.  Recent Labs   Lab 12/02/23  1136        Hold lasix and spironolactone    Bipolar 1 disorder, depressed, severe  - continue olanzapine 5mg qhs  - lithium level increased 1.6  - concern for recurrent hyperparathyroid hypercalcemia secondary to lithium  - psych consulted. Lithium dc. Repeat lithium level. Pt PEC per psych recommendations for concern for SI    - will resume lithium at 300 qhs  12/5        Severe protein-calorie malnutrition  Nutrition consulted. Most recent weight and BMI monitored-     Measurements:  Wt Readings from Last 1 Encounters:   11/29/23 40.5 kg (89 lb 4.6 oz)   Body mass index is 16.33 kg/m².  - dietary consulted, history       Interventions/Recommendations (treatment strategy):   - boost TID      Thrombocytopenia  Patient was found to have thrombocytopenia, the likely etiology is secondary to cirrhosis/portal hypertension, will monitor the platelets Daily. Will transfuse if platelet count is <10k. Hold DVT prophylaxis if platelets are <50k. The patient's platelet results have been reviewed and are listed below.  Recent Labs   Lab 11/29/23  0329   *         Cirrhosis, Capri's  Patient with known Cirrhosis. Co-morbidities are present and inclusive of hepatic encephalopathy and malnutrition.  MELD-Na score calculated; MELD 3.0: 22 at 12/1/2023 12:10 PM  MELD-Na: 19 at 12/1/2023 12:10 PM  Calculated from:  Serum Creatinine: 0.8 mg/dL (Using min of 1 mg/dL) at 12/1/2023 12:10 PM  Serum Sodium: 147 mmol/L (Using max of 137 mmol/L) at 12/1/2023 12:10 PM  Total Bilirubin: 4.4 mg/dL at 12/1/2023 12:10 PM  Serum Albumin: 2.3 g/dL at 12/1/2023 12:10 PM  INR(ratio): 1.9 at 11/29/2023  8:21 AM  Age at listing (hypothetical): 58 years  Sex: Female at 12/1/2023 12:10 PM      Continue chronic meds. Will avoid any hepatotoxic meds, and monitor CBC/CMP/INR for synthetic function.   Continue lactulose  Continue Rifaximin      VTE Risk Mitigation (From admission, onward)           Ordered     IP VTE HIGH RISK PATIENT  Once         11/29/23 0746                    Discharge Planning   BRAYAN: 12/6/2023     Code Status: Full Code   Is the patient medically ready for discharge?: No    Reason for patient still in hospital (select all that apply): Patient trending condition, Treatment, and Consult recommendations  Discharge Plan A: Return to nursing home   Discharge Delays: None known at this  time      Lavell Chatterjee MD  Department of Hospital Medicine   [facility]

## 2023-12-05 NOTE — PLAN OF CARE
Problem: Violence Risk or Actual  Goal: Anger and Impulse Control  Outcome: Ongoing, Progressing     Problem: Adult Inpatient Plan of Care  Goal: Plan of Care Review  Outcome: Ongoing, Progressing  Goal: Patient-Specific Goal (Individualized)  Outcome: Ongoing, Progressing  Goal: Absence of Hospital-Acquired Illness or Injury  Outcome: Ongoing, Progressing  Goal: Optimal Comfort and Wellbeing  Outcome: Ongoing, Progressing  Goal: Readiness for Transition of Care  Outcome: Ongoing, Progressing     Problem: Infection  Goal: Absence of Infection Signs and Symptoms  Outcome: Ongoing, Progressing     Problem: Impaired Wound Healing  Goal: Optimal Wound Healing  Outcome: Ongoing, Progressing     Problem: Skin Injury Risk Increased  Goal: Skin Health and Integrity  Outcome: Ongoing, Progressing     Problem: Suicide Risk  Goal: Absence of Self-Harm  Outcome: Ongoing, Progressing  Critical calcium called, MD at nursing station when call received, corrected calcium 8.4, stopping cinacalcet.  Psychiatry recommending CEC to be rescinded, primary notified.  Magnesium replaced as well today.  Patient without complaints throughout the shift.

## 2023-12-05 NOTE — NURSING
I secured text the PA on call, Gladys Rucker PA-C, and informed her that pt refused Lactulose last night with her night medication. KIARRA Graham, stated that shw will pass this information on to the day team.

## 2023-12-05 NOTE — ASSESSMENT & PLAN NOTE
The patient has hypercalcemia that is currently uncontrolled. The patient has the following symptoms due to their hypercalcemia: polydypsia and/or polyuria, weakness, and encephalopathy. The hypercalcemia is likely due to Hyperparathyroidism. We will obtain the following labs to work up the hypercalcemia: PTH   PTH, Intact   Date Value Ref Range Status   11/29/2023 115.1 (H) 9.0 - 77.0 pg/mL Final    . We will treat the hypercalcemia with: IV fluids and Calcitonin. Their latest calcium has been reviewed and is listed below.    Ionized Calcium   Date Value Ref Range Status   12/05/2023 1.00 (L) 1.06 - 1.42 mmol/L Final     - endocrine consulted and following  - started on sensipar

## 2023-12-05 NOTE — ASSESSMENT & PLAN NOTE
- continue olanzapine 5mg qhs  - lithium level increased 1.6  - concern for recurrent hyperparathyroid hypercalcemia secondary to lithium  - psych consulted. Lithium dc. Repeat lithium level. Pt PEC per psych recommendations for concern for SI    - will resume lithium at 300 qhs 12/5

## 2023-12-05 NOTE — SUBJECTIVE & OBJECTIVE
Interval History: No acute events, pt improving. Called patient sister and discussed plan of care. Sensipar held for hypocalcemia.     Plan to re-initiate lithium at lower dose tonight per psychiatry.     Review of Systems  Objective:     Vital Signs (Most Recent):  Temp: 99 °F (37.2 °C) (12/05/23 1101)  Pulse: 90 (12/05/23 1455)  Resp: 18 (12/05/23 1101)  BP: (!) 120/57 (12/05/23 1101)  SpO2: 96 % (12/05/23 1101) Vital Signs (24h Range):  Temp:  [97 °F (36.1 °C)-99 °F (37.2 °C)] 99 °F (37.2 °C)  Pulse:  [81-98] 90  Resp:  [14-18] 18  SpO2:  [95 %-96 %] 96 %  BP: (105-143)/(56-75) 120/57     Weight: 58.5 kg (129 lb)  Body mass index is 23.59 kg/m².  No intake or output data in the 24 hours ending 12/05/23 1610      Physical Exam  Vitals and nursing note reviewed.   Constitutional:       General: She is sleeping. She is not in acute distress.     Appearance: She is cachectic.   Cardiovascular:      Rate and Rhythm: Normal rate and regular rhythm.      Heart sounds: Normal heart sounds.   Pulmonary:      Effort: Pulmonary effort is normal. No respiratory distress.      Breath sounds: Normal breath sounds. No wheezing.   Abdominal:      General: Bowel sounds are normal. There is no distension.      Palpations: Abdomen is soft.      Tenderness: There is no abdominal tenderness.   Musculoskeletal:         General: No tenderness. Normal range of motion.      Cervical back: Normal range of motion and neck supple.   Lymphadenopathy:      Cervical: No cervical adenopathy.   Skin:     General: Skin is warm and dry.      Capillary Refill: Capillary refill takes less than 2 seconds.      Findings: Ecchymosis present. No rash.   Neurological:      Mental Status: She is disoriented.             Significant Labs: All pertinent labs within the past 24 hours have been reviewed.    Significant Imaging: I have reviewed all pertinent imaging results/findings within the past 24 hours.

## 2023-12-05 NOTE — PROGRESS NOTES
"CONSULTATION LIAISON PSYCHIATRY PROGRESS NOTE    Patient Name: Marely Hamilton  MRN: [id number]  Patient Class: IP- Inpatient  Admission Date: 11/29/2023  Attending Physician: Lavell Chatterjee*      SUBJECTIVE:   Marely Hamilton is a 58 y.o. female with past psychiatric history of bipolar disorder, alcohol use disorder & past pertinent medical history of seizure disorder, alcohol induced hepatic cirrhosis presents to the ED/admitted to the hospital for AMS in setting of recent unwitnessed fall at nursing home ([facility]). [facility] Psychiatry consulted for agitation/AMS.      No acute events overnight. Pt compliant with most scheduled medications, including Zyprexa 5mg. Refused lactulose overnight. No PRNs required for non-redirectable agitation overnight or this morning. Labs reviewed. Calcium 6.8. Senispar being held.   This morning, patient is noted to have pulled out her IV, reported to me that she felt it was uncomfortable. New IV in place, advised pt to inform nurse if IV is uncomfortable and not to pull it out, discussed associated risks.  She remains altered in mentation. Reports mood is "not good," states didn't sleep well last night. Continues to report feeling confused.  Is oriented to person, place, year, month, time of month (states date is Dec 2, re-oriented to correct date), situation.   No SI, HI. No AVH. No objective evidence of psychosis on interview.  +tremulous, as on previous interviews    Collateral:  Spoke with patient's sister, Zaria, who confirms hx of Bipolar disorder. Confirms that patient did take lithium and that lithium was the only medication that seemed to be effective in managing her bipolar symptoms. Zaria feels that the patient would benefit from lithium being restarted as soon as it is safe to do so, states "without it she wont sleep and will become manic."     Spoke with patient's outpatient psychiatrist, Dr. Coates, who confirms patient is a patient of his, though last saw over " "a year ago. Does have hx of Bipolar Disorder, seems to respond significantly to lithium even at lower doses, though other medications have not been as effective in treating her symptoms.       OBJECTIVE:    Mental Status Exam:  General Appearance: appears stated age, well developed and nourished, adequately groomed and appropriately dressed, in no acute distress  Behavior: agitated; uncooperative; appropriate eye-contact; under good behavioral control  Involuntary Movements and Motor Activity: no abnormal involuntary movements noted; no tics, no tremors, no akathisia, no dystonia, no evidence of tardive dyskinesia; no psychomotor agitation or retardation  Gait and Station: unable to assess - patient lying down or seated  Speech and Language: lowed, profane  Mood: "not good"  Affect: dysphoric   Thought Process and Associations: linear, goal-directed, abstract (proverbs & similarities)  Thought Content and Perceptions:: no suicidal ideation, no homicidal ideation, no auditory hallucinations, no visual hallucinations  Sensorium and Orientation: oriented fully (to person, place, and year, day of week but not month  Recent and Remote Memory: grossly intact  Attention and Concentration: Poor, fluctuant (failed SAVEAART, WORLD, Months in reverse)  Fund of Knowledge: Not assessed  Insight: limited  Judgment: limited    CAM ICU positive? no      ASSESSMENT & RECOMMENDATIONS   -Delirium   -Bipolar 1 Disorder     PSYCH MEDICATIONS  Scheduled:   Restart Lithium 300mg nightly---will monitor calcium  Benefit outweighs risk, 2 sources report lithium is only medication that works for patient, has severe mood episodes in the absence of lithium  Continue Zyprexa 5 mg QHS  PRN haldol 2mg PO/IV q8 hrs PRN for non-redirectable agitation. If she requires a PRN please obtain EKG and monitor for Qtc changes   EKG 11/29 Qtc: 389  Lithium Hx:  Li level 0.7 on 12/2/23  Li level 1.6 on 11/29/23  Li level: 0.3 on 10/16/23  Li level: 0.1 on " 10/10/23    DELIRIUM  DELIRIUM BEHAVIOR MANAGEMENT  PLEASE utilize  PRN meds first for agitation. Minimize use of PHYSICAL restraints OR have periods of being out of physical restraints if possible.  Keep window shades open and room lit during day and room dim at night in order to promote normal sleep-wake cycles  Encourage family at bedside. [place]ige[place] patient often to situation, location, date.  Continue to Limit or Discontinue use of Narcotics, Benzos and Anti-cholinergic medications as they may worsen delirium.  Continue medical workup for causative etiology of Delirium.      RISK ASSESSMENT  Continue CEC, as patient is gravely disabled at this time  FOLLOW UP  Will follow up while in house     DISPOSITION - once medically cleared:   Defer to medical team. Patient previously living at [facility]. Patients sister Zaria ([phone]) wishes to updated prior to discharge    Please contact ON CALL psychiatry service (24/7) for any acute issues that may arise.    Dr. Trent Matthew Wiedemann  \[facility]\""-[facility] PGY2   Psychiatry  [facility]  12/5/2023 7:15 AM        --------------------------------------------------------------------------------------------------------------------------------------------------------------------------------------------------------------------------------------    CONTINUED OBJECTIVE clinical data & findings reviewed and noted for above decision making    Current Medications:   Scheduled Meds:    ferrous sulfate  1 tablet Oral Daily    lactulose  30 g Oral TID    levetiracetam  1,000 mg Oral BID    OLANZapine  5 mg Oral QHS    pantoprazole  40 mg Oral BID    rifAXIMin  550 mg Oral BID    vitamin D  2,000 Units Oral Daily     PRN Meds: acetaminophen, aluminum-magnesium hydroxide-simethicone, bisacodyL, melatonin, ondansetron, polyethylene glycol, simethicone, sodium chloride 0.9%    Allergies:   Review of patient's allergies indicates:   Allergen Reactions     Sulfa (sulfonamide antibiotics) Rash    Codeine Nausea And Vomiting       Vitals  Vitals:    12/05/23 1104   BP:    Pulse: 82   Resp:    Temp:        Labs/Imaging/Studies:  Recent Results (from the past 24 hour(s))   Magnesium    Collection Time: 12/05/23  5:32 AM   Result Value Ref Range    Magnesium 1.3 (L) 1.6 - 2.6 mg/dL   Phosphorus    Collection Time: 12/05/23  5:32 AM   Result Value Ref Range    Phosphorus 3.0 2.7 - 4.5 mg/dL   CBC auto differential    Collection Time: 12/05/23  9:09 AM   Result Value Ref Range    WBC 4.13 3.90 - 12.70 K/uL    RBC 2.53 (L) 4.00 - 5.40 M/uL    Hemoglobin 9.2 (L) 12.0 - 16.0 g/dL    Hematocrit 28.1 (L) 37.0 - 48.5 %     (H) 82 - 98 fL    MCH 36.4 (H) 27.0 - 31.0 pg    MCHC 32.7 32.0 - 36.0 g/dL    RDW 17.2 (H) 11.5 - 14.5 %    Platelets 50 (L) 150 - 450 K/uL    MPV 10.0 9.2 - 12.9 fL    Immature Granulocytes 0.5 0.0 - 0.5 %    Gran # (ANC) 3.0 1.8 - 7.7 K/uL    Immature Grans (Abs) 0.02 0.00 - 0.04 K/uL    Lymph # 0.6 (L) 1.0 - 4.8 K/uL    Mono # 0.4 0.3 - 1.0 K/uL    Eos # 0.1 0.0 - 0.5 K/uL    Baso # 0.03 0.00 - 0.20 K/uL    nRBC 0 0 /100 WBC    Gran % 72.9 38.0 - 73.0 %    Lymph % 13.8 (L) 18.0 - 48.0 %    Mono % 9.4 4.0 - 15.0 %    Eosinophil % 2.7 0.0 - 8.0 %    Basophil % 0.7 0.0 - 1.9 %    Differential Method Automated    Comprehensive metabolic panel    Collection Time: 12/05/23  9:09 AM   Result Value Ref Range    Sodium 133 (L) 136 - 145 mmol/L    Potassium 4.3 3.5 - 5.1 mmol/L    Chloride 105 95 - 110 mmol/L    CO2 20 (L) 23 - 29 mmol/L    Glucose 168 (H) 70 - 110 mg/dL    BUN 8 6 - 20 mg/dL    Creatinine 0.6 0.5 - 1.4 mg/dL    Calcium 6.8 (LL) 8.7 - 10.5 mg/dL    Total Protein 4.7 (L) 6.0 - 8.4 g/dL    Albumin 2.0 (L) 3.5 - 5.2 g/dL    Total Bilirubin 4.3 (H) 0.1 - 1.0 mg/dL    Alkaline Phosphatase 207 (H) 55 - 135 U/L     (H) 10 - 40 U/L    ALT 84 (H) 10 - 44 U/L    eGFR >60.0 >60 mL/min/1.73 m^2    Anion Gap 8 8 - 16 mmol/L   Calcium, ionized     Collection Time: 12/05/23 10:26 AM   Result Value Ref Range    Ionized Calcium 1.00 (L) 1.06 - 1.42 mmol/L     Imaging Results              X-Ray Pelvis Routine AP (Final result)  Result time 11/29/23 06:20:52      Final result by Mumtaz Penny MD (11/29/23 06:20:52)                   Impression:      Noting limitations above, no convincing evidence of acute displaced fracture or dislocation.      Electronically signed by: Mumtaz Penny  Date:    11/29/2023  Time:    06:20               Narrative:    EXAMINATION:  XR PELVIS ROUTINE AP    CLINICAL HISTORY:  fall;    TECHNIQUE:  AP view of the pelvis was performed.    COMPARISON:  None.    FINDINGS:  Examination limited by suboptimal patient positioning and overlying bowel gas and stool.    Noting this, no definite evidence of acute displaced fracture or dislocation is identified.  Status post left total hip arthroplasty.  No definite hardware complication single provided view.    Partially imaged IVC filter.    Degenerative findings are noted involving the spine, sacroiliac joints, pubic symphysis, and right hip joint.  Moderate to large volume colonic stool partially imaged.    Phleboliths.    No definite radiopaque foreign body.                                       X-Ray Chest AP Portable (Final result)  Result time 11/29/23 05:29:50      Final result by Rangel Floyd MD (11/29/23 05:29:50)                   Impression:      As above.      Electronically signed by: Rangel Floyd MD  Date:    11/29/2023  Time:    05:29               Narrative:    EXAMINATION:  XR CHEST AP PORTABLE    CLINICAL HISTORY:  cough;    TECHNIQUE:  Single frontal view of the chest was performed.    COMPARISON:  09/16/2023    FINDINGS:  Cardiac monitoring leads overlie the chest.  The cardiac silhouette is not significantly enlarged.  There is stable mild elevation of the right hemidiaphragm.  There is probable subsegmental atelectasis or scarring at the right lung base.  The  remaining lungs demonstrate chronic coarse interstitial attenuation.  No new large confluent airspace consolidation appreciated.  No significant volume of pleural fluid or pneumothorax identified.  The visualized osseous structures demonstrate mild degenerative changes.  Postsurgical changes are noted of the right upper abdomen as well as possible cholelithiasis.                                       CT Head Without Contrast (Final result)  Result time 11/29/23 04:28:42      Final result by Jered Mandujano MD (11/29/23 04:28:42)                   Impression:      No evidence of acute intracranial abnormality.    Electronically signed by resident: Ce Vela  Date:    11/29/2023  Time:    04:19    Electronically signed by: Jered Mandujano MD  Date:    11/29/2023  Time:    04:28               Narrative:    EXAMINATION:  CT HEAD WITHOUT CONTRAST    CLINICAL HISTORY:  Head trauma, abnormal mental status (Age 19-64y);    TECHNIQUE:  Low dose axial CT images obtained throughout the head without the use of intravenous contrast.  Axial, sagittal and coronal reconstructions were performed.    COMPARISON:  CT 07/05/2023, 06/22/2023    FINDINGS:  Mild generalized cerebral volume loss with compensatory prominence of the ventricles and sulci.  There is patchy hypoattenuation through the supratentorial white matter which is nonspecific but may represent chronic microvascular ischemic changes. No parenchymal mass effect, midline shift, hemorrhage, edema or major vascular distribution infarct.    Ventricles and sulci are normal in size for age without evidence of hydrocephalus.    No extra-axial blood or fluid collections.    No displaced calvarial fracture.    Mild mucosal thickening of the paranasal sinuses.  The mastoid air cells are clear.

## 2023-12-06 LAB
ALBUMIN SERPL BCP-MCNC: 1.9 G/DL (ref 3.5–5.2)
ALP SERPL-CCNC: 224 U/L (ref 55–135)
ALT SERPL W/O P-5'-P-CCNC: 72 U/L (ref 10–44)
ANION GAP SERPL CALC-SCNC: 5 MMOL/L (ref 8–16)
AST SERPL-CCNC: 90 U/L (ref 10–40)
BILIRUB SERPL-MCNC: 3.6 MG/DL (ref 0.1–1)
BUN SERPL-MCNC: 11 MG/DL (ref 6–20)
CALCIUM SERPL-MCNC: 6.8 MG/DL (ref 8.7–10.5)
CHLORIDE SERPL-SCNC: 104 MMOL/L (ref 95–110)
CO2 SERPL-SCNC: 21 MMOL/L (ref 23–29)
CREAT SERPL-MCNC: 0.5 MG/DL (ref 0.5–1.4)
EST. GFR  (NO RACE VARIABLE): >60 ML/MIN/1.73 M^2
GLUCOSE SERPL-MCNC: 107 MG/DL (ref 70–110)
MAGNESIUM SERPL-MCNC: 1.3 MG/DL (ref 1.6–2.6)
PHOSPHATE SERPL-MCNC: 2.7 MG/DL (ref 2.7–4.5)
POTASSIUM SERPL-SCNC: 4.4 MMOL/L (ref 3.5–5.1)
PROT SERPL-MCNC: 4.6 G/DL (ref 6–8.4)
SODIUM SERPL-SCNC: 130 MMOL/L (ref 136–145)

## 2023-12-06 PROCEDURE — 80053 COMPREHEN METABOLIC PANEL: CPT | Performed by: STUDENT IN AN ORGANIZED HEALTH CARE EDUCATION/TRAINING PROGRAM

## 2023-12-06 PROCEDURE — 84100 ASSAY OF PHOSPHORUS: CPT | Performed by: STUDENT IN AN ORGANIZED HEALTH CARE EDUCATION/TRAINING PROGRAM

## 2023-12-06 PROCEDURE — 36415 COLL VENOUS BLD VENIPUNCTURE: CPT | Performed by: STUDENT IN AN ORGANIZED HEALTH CARE EDUCATION/TRAINING PROGRAM

## 2023-12-06 PROCEDURE — 83735 ASSAY OF MAGNESIUM: CPT | Performed by: STUDENT IN AN ORGANIZED HEALTH CARE EDUCATION/TRAINING PROGRAM

## 2023-12-06 PROCEDURE — 21400001 HC TELEMETRY ROOM

## 2023-12-06 PROCEDURE — 63600175 PHARM REV CODE 636 W HCPCS: Performed by: STUDENT IN AN ORGANIZED HEALTH CARE EDUCATION/TRAINING PROGRAM

## 2023-12-06 PROCEDURE — 25000003 PHARM REV CODE 250: Performed by: PHYSICIAN ASSISTANT

## 2023-12-06 RX ORDER — MAGNESIUM SULFATE HEPTAHYDRATE 40 MG/ML
2 INJECTION, SOLUTION INTRAVENOUS
Status: COMPLETED | OUTPATIENT
Start: 2023-12-06 | End: 2023-12-06

## 2023-12-06 RX ADMIN — MAGNESIUM SULFATE HEPTAHYDRATE 2 G: 40 INJECTION, SOLUTION INTRAVENOUS at 12:12

## 2023-12-06 RX ADMIN — LEVETIRACETAM 1000 MG: 500 SOLUTION ORAL at 11:12

## 2023-12-06 RX ADMIN — LACTULOSE 30 G: 20 SOLUTION ORAL at 03:12

## 2023-12-06 RX ADMIN — LEVETIRACETAM 1000 MG: 500 SOLUTION ORAL at 08:12

## 2023-12-06 RX ADMIN — MAGNESIUM SULFATE HEPTAHYDRATE 2 G: 40 INJECTION, SOLUTION INTRAVENOUS at 10:12

## 2023-12-06 NOTE — PROGRESS NOTES
[facility]  Progress Note    Patient Name: Marely Hamilton  MRN: [id number]  Patient Class: IP- Inpatient   Admission Date: 11/29/2023  Length of Stay: 7 days  Attending Physician: Lavell Chatterjee*  Primary Care Provider: Viktor Ross MD        Subjective:     Principal Problem:Acute metabolic encephalopathy        HPI:  Patient is a 58-year-old woman with EtOH cirrhosis, bipolar disorder, anemia, thrombocytopenia, cholelithiasis, DVT with IVC filter in place, GI bleed, ESBL UTI history, seizure disorder, presenting from nursing [facility] with encephalopathy with unwitnessed fall. She was found down at her nursing home. Per chart review patient is normally A&O x3 1 month prior.  She does not take blood thinners. Patient denies any pain at this time. She reports she has been urinating frequently though difficult to ascertain history. She reports the last thing she remembers today before she arrived to the hospital was using the bathroom. She denies recent illness/fever/chills/nausea/vomiting/diarrhea/constipation.       Overview/Hospital Course:  In the ED, vitals stable. Intake labs remarkable for Na 146, BUN 37, Cr 1.4, Calcium 14.2 (corrected 15), , Tbili 4.8, , , Ammonia 51, lipase 69, lactic 2.3, UA with 67 WBCs. She was given 2L IVF, calcitonin, ceftriaxone and admitted for further management. Continued on lactulose for treatment of HE, rocephin for treatment of UTI. She was started on IVF and calcitonin. Endocrine consulted for hypercalcemia. Hyperparathyroid hypercalcemia. Lithium level elevated. Psychiatry consulted and lithium dc. Calcium improved.     Interval History: No acute events. Pt reports feeling confused this am. Oriented to self. Lithium was resumed last night per psychiatry recs. Remains hypocalcemic so continuing to hold sensipar.    Review of Systems  Objective:     Vital Signs (Most Recent):  Temp: 97.7 °F (36.5 °C) (12/06/23  1100)  Pulse: 78 (12/06/23 1100)  Resp: 16 (12/06/23 1100)  BP: (!) 120/57 (12/06/23 1100)  SpO2: 96 % (12/06/23 1100) Vital Signs (24h Range):  Temp:  [97.7 °F (36.5 °C)-99.7 °F (37.6 °C)] 97.7 °F (36.5 °C)  Pulse:  [] 78  Resp:  [16-18] 16  SpO2:  [92 %-96 %] 96 %  BP: (105-127)/(53-58) 120/57     Weight: 58.5 kg (129 lb)  Body mass index is 23.59 kg/m².    Intake/Output Summary (Last 24 hours) at 12/6/2023 1503  Last data filed at 12/6/2023 1215  Gross per 24 hour   Intake 440 ml   Output 1250 ml   Net -810 ml         Physical Exam  Vitals and nursing note reviewed.   Constitutional:       General: She is sleeping. She is not in acute distress.     Appearance: She is cachectic.   Cardiovascular:      Rate and Rhythm: Normal rate and regular rhythm.      Heart sounds: Normal heart sounds.   Pulmonary:      Effort: Pulmonary effort is normal. No respiratory distress.      Breath sounds: Normal breath sounds. No wheezing.   Abdominal:      General: Bowel sounds are normal. There is no distension.      Palpations: Abdomen is soft.      Tenderness: There is no abdominal tenderness.   Musculoskeletal:         General: No tenderness. Normal range of motion.      Cervical back: Normal range of motion and neck supple.   Lymphadenopathy:      Cervical: No cervical adenopathy.   Skin:     General: Skin is warm and dry.      Capillary Refill: Capillary refill takes less than 2 seconds.      Findings: Ecchymosis present. No rash.   Neurological:      Mental Status: She is disoriented.             Significant Labs: All pertinent labs within the past 24 hours have been reviewed.    Significant Imaging: I have reviewed all pertinent imaging results/findings within the past 24 hours.    Assessment/Plan:      * Acute metabolic encephalopathy  Acute cystitis  Hypercalcemia  Hepatic encephalopathy  Patient presents from NH with acute mentation change in the setting of hypercalcemia and UTI.  - continue lactulose and  rifaximin  - continue ceftriaxone - completed course  - follow cultures  - hyperCa management as below      Body mass index (BMI) less than 19  - BMI 16.33  - dietary consulted      Fall  - CTH, XR pelvis unremarkable  - no bony tenderness      Hypomagnesemia  Patient has Abnormal Magnesium: hypomagnesemia. Will continue to monitor electrolytes closely. Will replace the affected electrolytes and repeat labs to be done after interventions completed. The patient's magnesium results have been reviewed and are listed below.  Recent Labs   Lab 12/01/23  1210   MG 2.1        Presence of IVC filter  - history of bleeds  - not on AC      Hypercalcemia  The patient has hypercalcemia that is currently uncontrolled. The patient has the following symptoms due to their hypercalcemia: polydypsia and/or polyuria, weakness, and encephalopathy. The hypercalcemia is likely due to Hyperparathyroidism. We will obtain the following labs to work up the hypercalcemia: PTH   PTH, Intact   Date Value Ref Range Status   11/29/2023 115.1 (H) 9.0 - 77.0 pg/mL Final    . We will treat the hypercalcemia with: IV fluids and Calcitonin. Their latest calcium has been reviewed and is listed below.    Ionized Calcium   Date Value Ref Range Status   12/05/2023 1.00 (L) 1.06 - 1.42 mmol/L Final     - endocrine consulted and following  - started on sensipar - held for hypocalcemia    Hypernatremia  Patient has hypernatremia which is controlled. The hypernatremia is due to Dehydration. We will aim to correct the sodium by 8-10mEq in 24 hours. We will correct their hypernatremia with Select IV fluids: D5W/0.45 NaCl at a rate of 75 ml/hr. The patient's sodium results have been reviewed and are listed below.  Recent Labs   Lab 12/02/23  1136        Hold lasix and spironolactone    Bipolar 1 disorder, depressed, severe  - continue olanzapine 5mg qhs  - lithium level increased 1.6  - concern for recurrent hyperparathyroid hypercalcemia secondary to  lithium  - psych consulted. Lithium dc. Repeat lithium level. Pt PEC per psych recommendations for concern for SI    - will resume lithium at 300 qhs 12/5        Severe protein-calorie malnutrition  Nutrition consulted. Most recent weight and BMI monitored-     Measurements:  Wt Readings from Last 1 Encounters:   11/29/23 40.5 kg (89 lb 4.6 oz)   Body mass index is 16.33 kg/m².  - dietary consulted, history       Interventions/Recommendations (treatment strategy):   - boost TID      Thrombocytopenia  Patient was found to have thrombocytopenia, the likely etiology is secondary to cirrhosis/portal hypertension, will monitor the platelets Daily. Will transfuse if platelet count is <10k. Hold DVT prophylaxis if platelets are <50k. The patient's platelet results have been reviewed and are listed below.  Recent Labs   Lab 11/29/23  0329   *         Cirrhosis, Capri's  Patient with known Cirrhosis. Co-morbidities are present and inclusive of hepatic encephalopathy and malnutrition.  MELD-Na score calculated; MELD 3.0: 22 at 12/1/2023 12:10 PM  MELD-Na: 19 at 12/1/2023 12:10 PM  Calculated from:  Serum Creatinine: 0.8 mg/dL (Using min of 1 mg/dL) at 12/1/2023 12:10 PM  Serum Sodium: 147 mmol/L (Using max of 137 mmol/L) at 12/1/2023 12:10 PM  Total Bilirubin: 4.4 mg/dL at 12/1/2023 12:10 PM  Serum Albumin: 2.3 g/dL at 12/1/2023 12:10 PM  INR(ratio): 1.9 at 11/29/2023  8:21 AM  Age at listing (hypothetical): 58 years  Sex: Female at 12/1/2023 12:10 PM      Continue chronic meds. Will avoid any hepatotoxic meds, and monitor CBC/CMP/INR for synthetic function.   Continue lactulose  Continue Rifaximin      VTE Risk Mitigation (From admission, onward)           Ordered     IP VTE HIGH RISK PATIENT  Once         11/29/23 0746                    Discharge Planning   BRAYAN: 12/7/2023     Code Status: Full Code   Is the patient medically ready for discharge?: No    Reason for patient still in hospital (select all that apply):  Patient trending condition, Treatment, and Consult recommendations  Discharge Plan A: Return to nursing home   Discharge Delays: None known at this time      Lavell Chatterjee MD  Department of Hospital Medicine   [facility]

## 2023-12-06 NOTE — PROGRESS NOTES
"CONSULTATION LIAISON PSYCHIATRY PROGRESS NOTE    Patient Name: Marely Hamilton  MRN: [id number]  Patient Class: IP- Inpatient  Admission Date: 11/29/2023  Attending Physician: Lavell Chatterjee*      SUBJECTIVE:   Marely Hamilton is a 58 y.o. female with past psychiatric history of bipolar disorder, alcohol use disorder & past pertinent medical history of seizure disorder, alcohol induced hepatic cirrhosis presents to the ED/admitted to the hospital for AMS in setting of recent unwitnessed fall at nursing home ([facility]).  Psychiatry consulted for agitation/AMS.      No acute events overnight. Lithium restarted. Compliant with lithium and Zyprexa, compliant with nightly non-psychiatric medications, though refusing some of her AM medications this morning. No PRNs required overnight for non-redirectable agitation.  Patient seen this morning. She is alert and oriented to person, place, year, month, date, but not to day of week, not  fully oriented as to the reason she was hospitalized from [facility]. Is aware that she was at [facility] prior to presenting to [facility], re-oriented to situation.    She is able to spell WORLD forwards but refuses to attempt backwards, also reports unable to state months of the year in reverse when prompted to. States she feels "scared" regarding where she will go after the hospital, discussed likely dispo plan pending improvement in her medical problems.   Pt voices "not being on my psychiatric medications" and worries about this, re-oriented patient to medication regimen, discussed restarting lithium last night, continuing Zyprexa since her admission.   Informed pt that I reached out to her sister regarding her status.   Mood is "not good," denies SI.        Collateral (12/5/23):  Spoke with patient's sister, Zaria, who confirms hx of Bipolar disorder. Confirms that patient did take lithium and that lithium was the only medication that seemed to be effective in managing her bipolar " "symptoms. Zaria feels that the patient would benefit from lithium being restarted as soon as it is safe to do so, states "without it she wont sleep and will become manic."   Regarding function, prior to 30 day hospitalization in July, patient was fully functional and able to tend to ADLs (drive a car, lived independently, managed expenses). Was hospitalized at [facility] requiring intubation, discharged after 29 days in early August.  Zaria is concerned the patient's cognitive baseline may have changed since this hospitalization, appears more confused most days, unclear if this is new baseline.    Spoke with patient's outpatient psychiatrist, Dr. Coates, who confirms patient is a patient of his, though last saw over a year ago. Does have hx of Bipolar Disorder, seems to respond significantly to lithium even at lower doses, though other medications have not been as effective in treating her symptoms.       OBJECTIVE:    Mental Status Exam:  General Appearance: appears stated age, well developed and nourished, adequately groomed and appropriately dressed, in no acute distress  Behavior: agitated; uncooperative; appropriate eye-contact; under good behavioral control  Involuntary Movements and Motor Activity: no abnormal involuntary movements noted; no tics, no tremors, no akathisia, no dystonia, no evidence of tardive dyskinesia; no psychomotor agitation or retardation  Gait and Station: unable to assess - patient lying down or seated  Speech and Language: lowed, profane  Mood: "not good"  Affect: dysphoric   Thought Process and Associations: linear, goal-directed, abstract (proverbs & similarities)  Thought Content and Perceptions:: no suicidal ideation, no homicidal ideation, no auditory hallucinations, no visual hallucinations  Sensorium and Orientation: oriented fully (to person, place, and year, day of week but not month  Recent and Remote Memory: grossly intact  Attention and Concentration: Poor, fluctuant (failed " FAITH, WORLD, Months in reverse)  Fund of Knowledge: Not assessed  Insight: limited  Judgment: limited    CAM ICU positive? no      ASSESSMENT & RECOMMENDATIONS   -Delirium   -Bipolar 1 Disorder   -R/o neurocognitive disorder---sister reports possible change in cognitive baseline following recent hospitalizations    PSYCH MEDICATIONS  Scheduled:   Restart Lithium 300mg nightly---will monitor calcium, unchanged today---primary holding Senispar  Benefit outweighs risk, 2 sources report lithium is only medication that works for patient, has severe mood episodes in the absence of lithium  Continue Zyprexa 5 mg QHS  PRN haldol 2mg PO/IV q8 hrs PRN for non-redirectable agitation. If she requires a PRN please obtain EKG and monitor for Qtc changes   EKG 11/29 Qtc: 389  Lithium Hx:  Li level 0.7 on 12/2/23  Li level 1.6 on 11/29/23  Li level: 0.3 on 10/16/23  Li level: 0.1 on 10/10/23    DELIRIUM  DELIRIUM BEHAVIOR MANAGEMENT  PLEASE utilize  PRN meds first for agitation. Minimize use of PHYSICAL restraints OR have periods of being out of physical restraints if possible.  Keep window shades open and room lit during day and room dim at night in order to promote normal sleep-wake cycles  Encourage family at bedside. [place] patient often to situation, location, date.  Continue to Limit or Discontinue use of Narcotics, Benzos and Anti-cholinergic medications as they may worsen delirium.  Continue medical workup for causative etiology of Delirium.      RISK ASSESSMENT  Continue CEC, as patient is gravely disabled at this time; can plan to rescind once medically clear for discharge    FOLLOW UP  Will follow up while in house     DISPOSITION - once medically cleared:   Defer to medical team. Patient previously living at [facility]. Patients sister Zaria ([phone]) wishes to updated prior to discharge    Please contact ON CALL psychiatry service (24/7) for any acute issues that may arise.    Dr. Roman Multani  [place]  [employer] PGY2  CL Psychiatry  [facility]  12/6/2023 7:15 AM        --------------------------------------------------------------------------------------------------------------------------------------------------------------------------------------------------------------------------------------    CONTINUED OBJECTIVE clinical data & findings reviewed and noted for above decision making    Current Medications:   Scheduled Meds:    ferrous sulfate  1 tablet Oral Daily    lactulose  30 g Oral TID    levetiracetam  1,000 mg Oral BID    lithium  300 mg Oral QHS    magnesium sulfate IVPB  2 g Intravenous Q2H    OLANZapine  5 mg Oral QHS    pantoprazole  40 mg Oral BID    rifAXIMin  550 mg Oral BID    vitamin D  2,000 Units Oral Daily     PRN Meds: acetaminophen, aluminum-magnesium hydroxide-simethicone, bisacodyL, melatonin, ondansetron, polyethylene glycol, simethicone, sodium chloride 0.9%    Allergies:   Review of patient's allergies indicates:   Allergen Reactions    Sulfa (sulfonamide antibiotics) Rash    Codeine Nausea And Vomiting       Vitals  Vitals:    12/06/23 0737   BP: (!) 127/58   Pulse: 88   Resp: 18   Temp: 98.3 °F (36.8 °C)       Labs/Imaging/Studies:  Recent Results (from the past 24 hour(s))   Calcium, ionized    Collection Time: 12/05/23 10:26 AM   Result Value Ref Range    Ionized Calcium 1.00 (L) 1.06 - 1.42 mmol/L   Magnesium    Collection Time: 12/06/23  3:52 AM   Result Value Ref Range    Magnesium 1.3 (L) 1.6 - 2.6 mg/dL   Phosphorus    Collection Time: 12/06/23  3:52 AM   Result Value Ref Range    Phosphorus 2.7 2.7 - 4.5 mg/dL   Comprehensive Metabolic Panel    Collection Time: 12/06/23  3:52 AM   Result Value Ref Range    Sodium 130 (L) 136 - 145 mmol/L    Potassium 4.4 3.5 - 5.1 mmol/L    Chloride 104 95 - 110 mmol/L    CO2 21 (L) 23 - 29 mmol/L    Glucose 107 70 - 110 mg/dL    BUN 11 6 - 20 mg/dL    Creatinine 0.5 0.5 - 1.4 mg/dL    Calcium 6.8  (LL) 8.7 - 10.5 mg/dL    Total Protein 4.6 (L) 6.0 - 8.4 g/dL    Albumin 1.9 (L) 3.5 - 5.2 g/dL    Total Bilirubin 3.6 (H) 0.1 - 1.0 mg/dL    Alkaline Phosphatase 224 (H) 55 - 135 U/L    AST 90 (H) 10 - 40 U/L    ALT 72 (H) 10 - 44 U/L    eGFR >60.0 >60 mL/min/1.73 m^2    Anion Gap 5 (L) 8 - 16 mmol/L     Imaging Results              X-Ray Pelvis Routine AP (Final result)  Result time 11/29/23 06:20:52      Final result by Mumtaz Penny MD (11/29/23 06:20:52)                   Impression:      Noting limitations above, no convincing evidence of acute displaced fracture or dislocation.      Electronically signed by: Mumtaz Penny  Date:    11/29/2023  Time:    06:20               Narrative:    EXAMINATION:  XR PELVIS ROUTINE AP    CLINICAL HISTORY:  fall;    TECHNIQUE:  AP view of the pelvis was performed.    COMPARISON:  None.    FINDINGS:  Examination limited by suboptimal patient positioning and overlying bowel gas and stool.    Noting this, no definite evidence of acute displaced fracture or dislocation is identified.  Status post left total hip arthroplasty.  No definite hardware complication single provided view.    Partially imaged IVC filter.    Degenerative findings are noted involving the spine, sacroiliac joints, pubic symphysis, and right hip joint.  Moderate to large volume colonic stool partially imaged.    Phleboliths.    No definite radiopaque foreign body.                                       X-Ray Chest AP Portable (Final result)  Result time 11/29/23 05:29:50      Final result by Rangel Floyd MD (11/29/23 05:29:50)                   Impression:      As above.      Electronically signed by: Rangel Floyd MD  Date:    11/29/2023  Time:    05:29               Narrative:    EXAMINATION:  XR CHEST AP PORTABLE    CLINICAL HISTORY:  cough;    TECHNIQUE:  Single frontal view of the chest was performed.    COMPARISON:  09/16/2023    FINDINGS:  Cardiac monitoring leads overlie the chest.   The cardiac silhouette is not significantly enlarged.  There is stable mild elevation of the right hemidiaphragm.  There is probable subsegmental atelectasis or scarring at the right lung base.  The remaining lungs demonstrate chronic coarse interstitial attenuation.  No new large confluent airspace consolidation appreciated.  No significant volume of pleural fluid or pneumothorax identified.  The visualized osseous structures demonstrate mild degenerative changes.  Postsurgical changes are noted of the right upper abdomen as well as possible cholelithiasis.                                       CT Head Without Contrast (Final result)  Result time 11/29/23 04:28:42      Final result by Jered Mandujano MD (11/29/23 04:28:42)                   Impression:      No evidence of acute intracranial abnormality.    Electronically signed by resident: Ce Vela  Date:    11/29/2023  Time:    04:19    Electronically signed by: Jered Mandujano MD  Date:    11/29/2023  Time:    04:28               Narrative:    EXAMINATION:  CT HEAD WITHOUT CONTRAST    CLINICAL HISTORY:  Head trauma, abnormal mental status (Age 19-64y);    TECHNIQUE:  Low dose axial CT images obtained throughout the head without the use of intravenous contrast.  Axial, sagittal and coronal reconstructions were performed.    COMPARISON:  CT 07/05/2023, 06/22/2023    FINDINGS:  Mild generalized cerebral volume loss with compensatory prominence of the ventricles and sulci.  There is patchy hypoattenuation through the supratentorial white matter which is nonspecific but may represent chronic microvascular ischemic changes. No parenchymal mass effect, midline shift, hemorrhage, edema or major vascular distribution infarct.    Ventricles and sulci are normal in size for age without evidence of hydrocephalus.    No extra-axial blood or fluid collections.    No displaced calvarial fracture.    Mild mucosal thickening of the paranasal sinuses.  The mastoid air  cells are clear.

## 2023-12-06 NOTE — SUBJECTIVE & OBJECTIVE
Interval History: No acute events. Pt reports feeling confused this am. Oriented to self. Lithium was resumed last night per psychiatry recs. Remains hypocalcemic so continuing to hold sensipar.    Review of Systems  Objective:     Vital Signs (Most Recent):  Temp: 97.7 °F (36.5 °C) (12/06/23 1100)  Pulse: 78 (12/06/23 1100)  Resp: 16 (12/06/23 1100)  BP: (!) 120/57 (12/06/23 1100)  SpO2: 96 % (12/06/23 1100) Vital Signs (24h Range):  Temp:  [97.7 °F (36.5 °C)-99.7 °F (37.6 °C)] 97.7 °F (36.5 °C)  Pulse:  [] 78  Resp:  [16-18] 16  SpO2:  [92 %-96 %] 96 %  BP: (105-127)/(53-58) 120/57     Weight: 58.5 kg (129 lb)  Body mass index is 23.59 kg/m².    Intake/Output Summary (Last 24 hours) at 12/6/2023 1503  Last data filed at 12/6/2023 1215  Gross per 24 hour   Intake 440 ml   Output 1250 ml   Net -810 ml         Physical Exam  Vitals and nursing note reviewed.   Constitutional:       General: She is sleeping. She is not in acute distress.     Appearance: She is cachectic.   Cardiovascular:      Rate and Rhythm: Normal rate and regular rhythm.      Heart sounds: Normal heart sounds.   Pulmonary:      Effort: Pulmonary effort is normal. No respiratory distress.      Breath sounds: Normal breath sounds. No wheezing.   Abdominal:      General: Bowel sounds are normal. There is no distension.      Palpations: Abdomen is soft.      Tenderness: There is no abdominal tenderness.   Musculoskeletal:         General: No tenderness. Normal range of motion.      Cervical back: Normal range of motion and neck supple.   Lymphadenopathy:      Cervical: No cervical adenopathy.   Skin:     General: Skin is warm and dry.      Capillary Refill: Capillary refill takes less than 2 seconds.      Findings: Ecchymosis present. No rash.   Neurological:      Mental Status: She is disoriented.             Significant Labs: All pertinent labs within the past 24 hours have been reviewed.    Significant Imaging: I have reviewed all pertinent  imaging results/findings within the past 24 hours.

## 2023-12-06 NOTE — PLAN OF CARE
Problem: Adult Inpatient Plan of Care  Goal: Plan of Care Review  Outcome: Ongoing, Progressing  Flowsheets (Taken 12/6/2023 0056)  Plan of Care Reviewed With: patient  Goal: Optimal Comfort and Wellbeing  Outcome: Ongoing, Progressing  Intervention: Monitor Pain and Promote Comfort  Flowsheets (Taken 12/6/2023 0056)  Pain Management Interventions:   position adjusted   medication offered     Problem: Impaired Wound Healing  Goal: Optimal Wound Healing  Outcome: Ongoing, Progressing  Intervention: Promote Wound Healing  Flowsheets (Taken 12/6/2023 0056)  Activity Management:   Arm raise - L1   Rolling - L1  Pain Management Interventions:   position adjusted   medication offered     Problem: Suicide Risk  Goal: Absence of Self-Harm  Outcome: Ongoing, Progressing  Intervention: Assess Risk to Self and Maintain Safety  Flowsheets (Taken 12/6/2023 0056)  Self-Harm Prevention:   environmental self-harm risks assessed   environment modified for self-harm risk   observed one-to-one

## 2023-12-06 NOTE — NURSING
Critical lab reported over to MD, also informed of patient refusal of taking morning meds and would like to speak to kyleigh IBARRA.

## 2023-12-06 NOTE — NURSING
Wound care done this morning as per wound care nurse recommendation.  Pt remained free from injuries within the shift. Sitter remained at the bedside. No complains. All needs attended.

## 2023-12-06 NOTE — ASSESSMENT & PLAN NOTE
Acute cystitis  Hypercalcemia  Hepatic encephalopathy  Patient presents from NH with acute mentation change in the setting of hypercalcemia and UTI.  - continue lactulose and rifaximin  - continue ceftriaxone - completed course  - follow cultures  - hyperCa management as below

## 2023-12-06 NOTE — ASSESSMENT & PLAN NOTE
The patient has hypercalcemia that is currently uncontrolled. The patient has the following symptoms due to their hypercalcemia: polydypsia and/or polyuria, weakness, and encephalopathy. The hypercalcemia is likely due to Hyperparathyroidism. We will obtain the following labs to work up the hypercalcemia: PTH   PTH, Intact   Date Value Ref Range Status   11/29/2023 115.1 (H) 9.0 - 77.0 pg/mL Final    . We will treat the hypercalcemia with: IV fluids and Calcitonin. Their latest calcium has been reviewed and is listed below.    Ionized Calcium   Date Value Ref Range Status   12/05/2023 1.00 (L) 1.06 - 1.42 mmol/L Final     - endocrine consulted and following  - started on sensipar - held for hypocalcemia

## 2023-12-06 NOTE — PLAN OF CARE
Problem: Violence Risk or Actual  Goal: Anger and Impulse Control  Outcome: Ongoing, Progressing     Problem: Impaired Wound Healing  Goal: Optimal Wound Healing  Outcome: Ongoing, Progressing     Problem: Suicide Risk  Goal: Absence of Self-Harm  Outcome: Ongoing, Progressing     Patient aaox1-2. Denies any pain or discomfort. Calm and corporative. Safety sitter remains at bedside. No abnormal behavior noted. No signs of wanting to harm self noted. Will continue to monitor.

## 2023-12-07 PROBLEM — E83.51 HYPOCALCEMIA: Status: ACTIVE | Noted: 2023-12-07

## 2023-12-07 LAB
ALBUMIN SERPL BCP-MCNC: 1.8 G/DL (ref 3.5–5.2)
ALP SERPL-CCNC: 192 U/L (ref 55–135)
ALT SERPL W/O P-5'-P-CCNC: 62 U/L (ref 10–44)
ANION GAP SERPL CALC-SCNC: 5 MMOL/L (ref 8–16)
AST SERPL-CCNC: 78 U/L (ref 10–40)
BILIRUB DIRECT SERPL-MCNC: 1.5 MG/DL (ref 0.1–0.3)
BILIRUB SERPL-MCNC: 3.4 MG/DL (ref 0.1–1)
BUN SERPL-MCNC: 9 MG/DL (ref 6–20)
CALCIUM SERPL-MCNC: 6.9 MG/DL (ref 8.7–10.5)
CHLORIDE SERPL-SCNC: 105 MMOL/L (ref 95–110)
CO2 SERPL-SCNC: 23 MMOL/L (ref 23–29)
CREAT SERPL-MCNC: 0.5 MG/DL (ref 0.5–1.4)
EST. GFR  (NO RACE VARIABLE): >60 ML/MIN/1.73 M^2
GLUCOSE SERPL-MCNC: 119 MG/DL (ref 70–110)
MAGNESIUM SERPL-MCNC: 1.4 MG/DL (ref 1.6–2.6)
PHOSPHATE SERPL-MCNC: 2.6 MG/DL (ref 2.7–4.5)
POTASSIUM SERPL-SCNC: 4.2 MMOL/L (ref 3.5–5.1)
PROT SERPL-MCNC: 4.5 G/DL (ref 6–8.4)
SODIUM SERPL-SCNC: 133 MMOL/L (ref 136–145)

## 2023-12-07 PROCEDURE — 80048 BASIC METABOLIC PNL TOTAL CA: CPT | Performed by: STUDENT IN AN ORGANIZED HEALTH CARE EDUCATION/TRAINING PROGRAM

## 2023-12-07 PROCEDURE — 63600175 PHARM REV CODE 636 W HCPCS: Performed by: STUDENT IN AN ORGANIZED HEALTH CARE EDUCATION/TRAINING PROGRAM

## 2023-12-07 PROCEDURE — 80076 HEPATIC FUNCTION PANEL: CPT | Performed by: STUDENT IN AN ORGANIZED HEALTH CARE EDUCATION/TRAINING PROGRAM

## 2023-12-07 PROCEDURE — 36415 COLL VENOUS BLD VENIPUNCTURE: CPT | Performed by: STUDENT IN AN ORGANIZED HEALTH CARE EDUCATION/TRAINING PROGRAM

## 2023-12-07 PROCEDURE — 21400001 HC TELEMETRY ROOM

## 2023-12-07 PROCEDURE — 84100 ASSAY OF PHOSPHORUS: CPT | Performed by: STUDENT IN AN ORGANIZED HEALTH CARE EDUCATION/TRAINING PROGRAM

## 2023-12-07 PROCEDURE — 25000003 PHARM REV CODE 250: Performed by: STUDENT IN AN ORGANIZED HEALTH CARE EDUCATION/TRAINING PROGRAM

## 2023-12-07 PROCEDURE — 83735 ASSAY OF MAGNESIUM: CPT | Performed by: STUDENT IN AN ORGANIZED HEALTH CARE EDUCATION/TRAINING PROGRAM

## 2023-12-07 PROCEDURE — 25000003 PHARM REV CODE 250: Performed by: PHYSICIAN ASSISTANT

## 2023-12-07 RX ORDER — MAGNESIUM SULFATE HEPTAHYDRATE 40 MG/ML
2 INJECTION, SOLUTION INTRAVENOUS
Status: COMPLETED | OUTPATIENT
Start: 2023-12-07 | End: 2023-12-07

## 2023-12-07 RX ADMIN — MAGNESIUM SULFATE HEPTAHYDRATE 2 G: 40 INJECTION, SOLUTION INTRAVENOUS at 12:12

## 2023-12-07 RX ADMIN — LEVETIRACETAM 1000 MG: 500 SOLUTION ORAL at 08:12

## 2023-12-07 RX ADMIN — ACETAMINOPHEN 650 MG: 325 TABLET ORAL at 09:12

## 2023-12-07 RX ADMIN — LEVETIRACETAM 1000 MG: 500 SOLUTION ORAL at 09:12

## 2023-12-07 RX ADMIN — MAGNESIUM SULFATE HEPTAHYDRATE 2 G: 40 INJECTION, SOLUTION INTRAVENOUS at 02:12

## 2023-12-07 RX ADMIN — ACETAMINOPHEN 650 MG: 325 TABLET ORAL at 02:12

## 2023-12-07 NOTE — ASSESSMENT & PLAN NOTE
Patient has hypocalcemia due to  iatrogenic after treatment for hypercalcemia   which is currently uncontrolled, and has been confirmed with ionized and/or corrected calcium.  The latest calcium labs have been reviewed and are listed below.  Recent Labs   Lab 12/07/23  0752   CALCIUM 6.9*   - replacement on hold currently as patient recently with hypercalcemia  - lithium restarted 12/5  - reconsult endocrine as patient also with hypomagnesemia

## 2023-12-07 NOTE — PLAN OF CARE
Problem: Violence Risk or Actual  Goal: Anger and Impulse Control  Outcome: Ongoing, Progressing     Problem: Adult Inpatient Plan of Care  Goal: Plan of Care Review  Outcome: Ongoing, Progressing  Goal: Patient-Specific Goal (Individualized)  Outcome: Ongoing, Progressing  Goal: Absence of Hospital-Acquired Illness or Injury  Outcome: Ongoing, Progressing  Goal: Optimal Comfort and Wellbeing  Outcome: Ongoing, Progressing  Goal: Readiness for Transition of Care  Outcome: Ongoing, Progressing     Problem: Infection  Goal: Absence of Infection Signs and Symptoms  Outcome: Ongoing, Progressing     Problem: Impaired Wound Healing  Goal: Optimal Wound Healing  Outcome: Ongoing, Progressing     Problem: Skin Injury Risk Increased  Goal: Skin Health and Integrity  Outcome: Ongoing, Progressing     Problem: Suicide Risk  Goal: Absence of Self-Harm  Outcome: Ongoing, Progressing

## 2023-12-07 NOTE — PROGRESS NOTES
[facility]  Progress Note    Patient Name: Marely Hamilton  MRN: [id number]  Patient Class: IP- Inpatient   Admission Date: 11/29/2023  Length of Stay: 8 days  Attending Physician: Nathaly Roberto MD  Primary Care Provider: Viktor Ross MD        Subjective:     Principal Problem:Acute metabolic encephalopathy        HPI:  Patient is a 58-year-old woman with EtOH cirrhosis, bipolar disorder, anemia, thrombocytopenia, cholelithiasis, DVT with IVC filter in place, GI bleed, ESBL UTI history, seizure disorder, presenting from nursing [facility] with encephalopathy with unwitnessed fall. She was found down at her nursing home. Per chart review patient is normally A&O x3 1 month prior.  She does not take blood thinners. Patient denies any pain at this time. She reports she has been urinating frequently though difficult to ascertain history. She reports the last thing she remembers today before she arrived to the hospital was using the bathroom. She denies recent illness/fever/chills/nausea/vomiting/diarrhea/constipation.       Overview/Hospital Course:  In the ED, vitals stable. Intake labs remarkable for Na 146, BUN 37, Cr 1.4, Calcium 14.2 (corrected 15), , Tbili 4.8, , , Ammonia 51, lipase 69, lactic 2.3, UA with 67 WBCs. She was given 2L IVF, calcitonin, ceftriaxone and admitted for further management. Continued on lactulose for treatment of HE, rocephin for treatment of UTI. She was started on IVF and calcitonin. Endocrine consulted for hypercalcemia. Hyperparathyroid hypercalcemia. Lithium level elevated. Psychiatry consulted and lithium stopped. Calcium downtrended with these interventions. By 12/1, patient hypocalcemic. Lithium restarted 12/5. Psych continuing to follow.     Interval History: Patient disoriented this morning. Only oriented to self. Minimal improvement in calcium after restarting lithium.      Review of Systems  Denies chest pain, SOB, n/v, c/d.      Objective:     Vital Signs (Most Recent):  Temp: 98.3 °F (36.8 °C) (12/07/23 1039)  Pulse: 76 (12/07/23 1117)  Resp: 16 (12/07/23 1039)  BP: 119/66 (12/07/23 1039)  SpO2: (!) 92 % (12/07/23 1039) Vital Signs (24h Range):  Temp:  [98.1 °F (36.7 °C)-99.1 °F (37.3 °C)] 98.3 °F (36.8 °C)  Pulse:  [70-90] 76  Resp:  [16-20] 16  SpO2:  [90 %-96 %] 92 %  BP: (101-132)/(56-66) 119/66     Weight: 58.5 kg (129 lb)  Body mass index is 23.59 kg/m².    Intake/Output Summary (Last 24 hours) at 12/7/2023 1412  Last data filed at 12/7/2023 1218  Gross per 24 hour   Intake 822 ml   Output 700 ml   Net 122 ml         Physical Exam  Vitals and nursing note reviewed.   Constitutional:       General: She is not in acute distress.     Appearance: She is cachectic.   Cardiovascular:      Rate and Rhythm: Normal rate and regular rhythm.      Heart sounds: Normal heart sounds.   Pulmonary:      Effort: Pulmonary effort is normal. No respiratory distress.      Breath sounds: Normal breath sounds. No wheezing.   Abdominal:      General: Bowel sounds are normal. There is no distension.      Palpations: Abdomen is soft.      Tenderness: There is no abdominal tenderness.   Musculoskeletal:         General: No tenderness. Normal range of motion.      Cervical back: Normal range of motion and neck supple.   Lymphadenopathy:      Cervical: No cervical adenopathy.   Skin:     General: Skin is warm and dry.      Capillary Refill: Capillary refill takes less than 2 seconds.      Findings: Ecchymosis present. No rash.   Neurological:      Mental Status: She is alert. She is disoriented and confused.      Comments: Oriented to person.   Psychiatric:         Mood and Affect: Affect is flat.             Significant Labs: All pertinent labs within the past 24 hours have been reviewed.  CMP:   Recent Labs   Lab 12/06/23  0352 12/07/23  0752   * 133*   K 4.4 4.2    105   CO2 21* 23    119*   BUN 11 9   CREATININE 0.5 0.5   CALCIUM 6.8*  6.9*   PROT 4.6* 4.5*   ALBUMIN 1.9* 1.8*   BILITOT 3.6* 3.4*   ALKPHOS 224* 192*   AST 90* 78*   ALT 72* 62*   ANIONGAP 5* 5*       Significant Imaging: I have reviewed all pertinent imaging results/findings within the past 24 hours.    Assessment/Plan:      * Acute metabolic encephalopathy  Acute cystitis  Hypercalcemia  Hepatic encephalopathy  Patient presents from NH with acute mentation change in the setting of hypercalcemia and UTI. Concern current mentation may be new baseline. Psych following.  - continue lactulose and rifaximin  - continue ceftriaxone - completed course      Bipolar 1 disorder, depressed, severe  Pt CEC per psych recommendations for concern for SI  Lithium initially stopped on presentation due to concern for hyperparathyroid hypercalcemia secondary to lithium. Lithium restarted 12/5 as it is the only medications that works for the patient.   - psych following  - continue lithium  - continue olanzapine 5mg qhs      Hypocalcemia  Patient has hypocalcemia due to  iatrogenic after treatment for hypercalcemia   which is currently uncontrolled, and has been confirmed with ionized and/or corrected calcium.  The latest calcium labs have been reviewed and are listed below.  Recent Labs   Lab 12/07/23  0752   CALCIUM 6.9*   - replacement on hold currently as patient recently with hypercalcemia  - lithium restarted 12/5          Hypercalcemia  The patient has hypercalcemia that is currently controlled. The patient has the following symptoms due to their hypercalcemia: polydypsia and/or polyuria, weakness, and encephalopathy. The hypercalcemia is likely due to Hyperparathyroidism. We will obtain the following labs to work up the hypercalcemia: PTH (115). Patient treated with IV fluids, calcitonin, and sensipar - now on hold due to hypocalcemia  - endocrine consulted on presentation, no longer following    Hypomagnesemia  Patient has Abnormal Magnesium: hypomagnesemia. Will continue to monitor  electrolytes closely. Will replace the affected electrolytes and repeat labs to be done after interventions completed. The patient's magnesium results have been reviewed and are listed below.  Recent Labs   Lab 12/07/23  0752   MG 1.4*          Severe protein-calorie malnutrition  Nutrition consulted. Most recent weight and BMI monitored-     Measurements:  Wt Readings from Last 1 Encounters:   11/29/23 40.5 kg (89 lb 4.6 oz)   Body mass index is 16.33 kg/m².  - dietary consulted, history       Interventions/Recommendations (treatment strategy):   - boost TID      Body mass index (BMI) less than 19  - BMI 16.33  - dietary consulted      Fall  - CTH, XR pelvis unremarkable  - no bony tenderness      Presence of IVC filter  - history of bleeds  - not on AC      Hypernatremia  Resolved  Patient has hypernatremia which is controlled. The hypernatremia is due to Dehydration. We will aim to correct the sodium by 8-10mEq in 24 hours. We will correct their hypernatremia with Select IV fluids: D5W/0.45 NaCl at a rate of 75 ml/hr. The patient's sodium results have been reviewed and are listed below.  Recent Labs   Lab 12/07/23  0752   *     Hold lasix and spironolactone    Thrombocytopenia  Patient was found to have thrombocytopenia, the likely etiology is secondary to cirrhosis/portal hypertension, will monitor the platelets Daily. Will transfuse if platelet count is <10k. Hold DVT prophylaxis if platelets are <50k. The patient's platelet results have been reviewed and are listed below.  Recent Labs   Lab 11/29/23  0329   *         Cirrhosis, Capri's  Patient with known Cirrhosis. Co-morbidities are present and inclusive of hepatic encephalopathy and malnutrition.  MELD-Na score calculated; MELD 3.0: 22 at 12/1/2023 12:10 PM  MELD-Na: 19 at 12/1/2023 12:10 PM  Calculated from:  Serum Creatinine: 0.8 mg/dL (Using min of 1 mg/dL) at 12/1/2023 12:10 PM  Serum Sodium: 147 mmol/L (Using max of 137 mmol/L) at  12/1/2023 12:10 PM  Total Bilirubin: 4.4 mg/dL at 12/1/2023 12:10 PM  Serum Albumin: 2.3 g/dL at 12/1/2023 12:10 PM  INR(ratio): 1.9 at 11/29/2023  8:21 AM  Age at listing (hypothetical): 58 years  Sex: Female at 12/1/2023 12:10 PM      Continue chronic meds. Will avoid any hepatotoxic meds, and monitor CBC/CMP/INR for synthetic function.   Continue lactulose  Continue Rifaximin      VTE Risk Mitigation (From admission, onward)           Ordered     IP VTE HIGH RISK PATIENT  Once         11/29/23 0746                    Discharge Planning   BRAYAN: 12/8/2023     Code Status: Full Code   Is the patient medically ready for discharge?: No    Reason for patient still in hospital (select all that apply): Patient trending condition  Discharge Plan A: Return to nursing home   Discharge Delays: None known at this time              Nathaly Roberto MD  Department of Hospital Medicine   [facility] - Med Surg

## 2023-12-07 NOTE — SUBJECTIVE & OBJECTIVE
Interval History: Patient disoriented this morning. Only oriented to self. Minimal improvement in calcium after restarting lithium.      Review of Systems  Denies chest pain, SOB, n/v, c/d.     Objective:     Vital Signs (Most Recent):  Temp: 98.3 °F (36.8 °C) (12/07/23 1039)  Pulse: 76 (12/07/23 1117)  Resp: 16 (12/07/23 1039)  BP: 119/66 (12/07/23 1039)  SpO2: (!) 92 % (12/07/23 1039) Vital Signs (24h Range):  Temp:  [98.1 °F (36.7 °C)-99.1 °F (37.3 °C)] 98.3 °F (36.8 °C)  Pulse:  [70-90] 76  Resp:  [16-20] 16  SpO2:  [90 %-96 %] 92 %  BP: (101-132)/(56-66) 119/66     Weight: 58.5 kg (129 lb)  Body mass index is 23.59 kg/m².    Intake/Output Summary (Last 24 hours) at 12/7/2023 1412  Last data filed at 12/7/2023 1218  Gross per 24 hour   Intake 822 ml   Output 700 ml   Net 122 ml         Physical Exam  Vitals and nursing note reviewed.   Constitutional:       General: She is not in acute distress.     Appearance: She is cachectic.   Cardiovascular:      Rate and Rhythm: Normal rate and regular rhythm.      Heart sounds: Normal heart sounds.   Pulmonary:      Effort: Pulmonary effort is normal. No respiratory distress.      Breath sounds: Normal breath sounds. No wheezing.   Abdominal:      General: Bowel sounds are normal. There is no distension.      Palpations: Abdomen is soft.      Tenderness: There is no abdominal tenderness.   Musculoskeletal:         General: No tenderness. Normal range of motion.      Cervical back: Normal range of motion and neck supple.   Lymphadenopathy:      Cervical: No cervical adenopathy.   Skin:     General: Skin is warm and dry.      Capillary Refill: Capillary refill takes less than 2 seconds.      Findings: Ecchymosis present. No rash.   Neurological:      Mental Status: She is alert. She is disoriented and confused.      Comments: Oriented to person.   Psychiatric:         Mood and Affect: Affect is flat.             Significant Labs: All pertinent labs within the past 24 hours  have been reviewed.  CMP:   Recent Labs   Lab 12/06/23  0352 12/07/23  0752   * 133*   K 4.4 4.2    105   CO2 21* 23    119*   BUN 11 9   CREATININE 0.5 0.5   CALCIUM 6.8* 6.9*   PROT 4.6* 4.5*   ALBUMIN 1.9* 1.8*   BILITOT 3.6* 3.4*   ALKPHOS 224* 192*   AST 90* 78*   ALT 72* 62*   ANIONGAP 5* 5*       Significant Imaging: I have reviewed all pertinent imaging results/findings within the past 24 hours.

## 2023-12-07 NOTE — ASSESSMENT & PLAN NOTE
Resolved  Patient has hypernatremia which is controlled. The hypernatremia is due to Dehydration. We will aim to correct the sodium by 8-10mEq in 24 hours. We will correct their hypernatremia with Select IV fluids: D5W/0.45 NaCl at a rate of 75 ml/hr. The patient's sodium results have been reviewed and are listed below.  Recent Labs   Lab 12/07/23  0752   *     Hold lasix and spironolactone

## 2023-12-07 NOTE — PROGRESS NOTES
[facility] Med S[facility]  Adult Nutrition  Progress Note    SUMMARY       Recommendations    1. Continue Regular Diet. Continue to encourage PO intake. Consider providing assistance at all meals.   2. Continue Boost Plus/VHC OR Ensure Enlive with all meals for additional calories/PRO.   3. Recommend appetite stimulant.   4. Recommend MVI + B-complex w/ vitamin C.   5. Continue daily weights.   6. RD to monitor and follow-up.    Goals: Meet % EEN/EPN by follow-up date.  Nutrition Goal Status: progressing towards goal  Communication of RD Recs: other (comment) (POC)    Assessment and Plan    Endocrine  Severe protein-calorie malnutrition  Malnutrition Type:  Context: chronic illness  Level: severe    Related to (etiology):   Decreased ability to consume sufficient energy    Signs and Symptoms (as evidenced by):   AMS     Malnutrition Characteristic Summary:  Weight Loss (Malnutrition): other (see comments) (29% x 1 month)  Energy Intake (Malnutrition): less than 75% for greater than or equal to 3 months  Subcutaneous Fat (Malnutrition): severe depletion  Muscle Mass (Malnutrition): severe depletion      Interventions/Recommendations (treatment strategy):  Collaboration of nutrition care with other providers     Nutrition Diagnosis Status:   Continues         Malnutrition Assessment  Malnutrition Context: chronic illness  Malnutrition Level: severe  Skin (Micronutrient): bruised, turgor reduced, thinned  Nails (Micronutrient): clubbed, nail ends curved  Hair/Scalp (Micronutrient): plucked easily, dull, lusterless, brittle  Eyes (Micronutrient): none  Extraoral (Micronutrient): none  Gums (Micronutrient): none  Lips/Mucous Membranes (Micronutrient):  (dry, cracked)  Teeth (Micronutrient): carries, mottled, yellow-brown pigment  Tongue (Micronutrient): none  Neck/Chest (Micronutrient): bony prominence, muscle wasting, subcutaneous fat loss  Musculoskeletal/Lower Extremities: muscle control poor, subcutaneous fat loss,  muscle wasting   Micronutrient Evaluation Summary: suspected deficiency   Weight Loss (Malnutrition): other (see comments) (29% x 1 month)  Energy Intake (Malnutrition): less than 75% for greater than or equal to 3 months  Subcutaneous Fat (Malnutrition): severe depletion  Muscle Mass (Malnutrition): severe depletion   Orbital Region (Subcutaneous Fat Loss): severe depletion  Upper Arm Region (Subcutaneous Fat Loss): severe depletion  Thoracic and Lumbar Region: severe depletion   [place] Region (Muscle Loss): severe depletion  Clavicle Bone Region (Muscle Loss): severe depletion  Clavicle and Acromion Bone Region (Muscle Loss): severe depletion  Scapular Bone Region (Muscle Loss): severe depletion  Dorsal Hand (Muscle Loss): severe depletion  Patellar Region (Muscle Loss): severe depletion  Anterior Thigh Region (Muscle Loss): severe depletion  Posterior Calf Region (Muscle Loss): severe depletion                 Reason for Assessment    Reason For Assessment: RD follow-up  Diagnosis: other (see comments) (Acute metabolic encephalopathy)  Relevant Medical History: alcohol abuse, anemia, depression, suicide attempt, crystal  Interdisciplinary Rounds: did not attend  General Information Comments: RD follow-up. Patient resting during RD visit. Spoke with sitter patient eating very little but drinks supplement. While on floor patient tray was delivered patient may benefit from assistance being provided at meals, poor mucsle control noted. While in the room patient would not give eye contact or say anything to me. PO intake varies per chart.  Nutrition Discharge Planning: Pending Clinical Course    Nutrition Risk Screen    Nutrition Risk Screen: no indicators present    Nutrition/Diet History    Patient Reported Diet/Restrictions/Preferences: general  Spiritual, Cultural Beliefs, Protestant Practices, Values that Affect Care: no  Food Allergies: NKFA  Factors Affecting Nutritional Intake: impaired cognitive status/motor  "control    Anthropometrics    Temp: 98.4 °F (36.9 °C)  Height: 5' 2" (157.5 cm)  Height (inches): 62 in  Weight Method: Bed Scale  Weight: 58.5 kg (129 lb)  Weight (lb): 129 lb  Ideal Body Weight (IBW), Female: 110 lb  % Ideal Body Weight, Female (lb): 117.27 %  BMI (Calculated): 23.6       Lab/Procedures/Meds    Pertinent Labs Reviewed: reviewed  Pertinent Labs Comments: Na 133, Glu 119, Ca 6.9, P 2.6, Mg 1.4, , TP 4.5, TBili 3.4, AST 78, ALT 62  Pertinent Medications Reviewed: reviewed  Pertinent Medications Comments: calcitonin, ferrous sulfate, lasix, lactulose, keppra, lithium, olanzapine, pantoprazole, spironolactone, vit D, NaCl, lithium, magnesium sulfate, rifaximin      Estimated/Assessed Needs    Weight Used For Calorie Calculations: 58.5 kg (129 lb)  Energy Calorie Requirements (kcal): 2048 kcal/d (35 kcal/kg)  Energy Need Method: Kcal/kg  Protein Requirements: 88 g/d (1.5 g/kg)  Weight Used For Protein Calculations: 58.5 kg (129 lb)        RDA Method (mL): 2048         Nutrition Prescription Ordered    Current Diet Order: Regular  Oral Nutrition Supplement: Boost Plus (All meals)    Evaluation of Received Nutrient/Fluid Intake    I/O: -560 mL  Comments: LBM: 11/4  % Intake of Estimated Energy Needs: 75 - 100 %  % Meal Intake: 50 - 75 %    Nutrition Risk    Level of Risk/Frequency of Follow-up: high (2x/ week)     Monitor and Evaluation    Food and Nutrient Intake: energy intake, food and beverage intake  Food and Nutrient Adminstration: diet order  Knowledge/Beliefs/Attitudes: food and nutrition knowledge/skill, beliefs and attitudes  Physical Activity and Function: nutrition-related ADLs and IADLs, factors affecting access to physical activity  Anthropometric Measurements: weight, weight change, body mass index  Biochemical Data, Medical Tests and Procedures: electrolyte and renal panel, gastrointestinal profile, glucose/endocrine profile, inflammatory profile, lipid profile  Nutrition-Focused " Physical Findings: overall appearance, extremities, muscles and bones, head and eyes, skin     Nutrition Follow-Up    RD Follow-up?: Yes    Cholo Blackwell Registration Eligible, Provisional LDN

## 2023-12-07 NOTE — ASSESSMENT & PLAN NOTE
Patient has Abnormal Magnesium: hypomagnesemia. Will continue to monitor electrolytes closely. Will replace the affected electrolytes and repeat labs to be done after interventions completed. The patient's magnesium results have been reviewed and are listed below.  Recent Labs   Lab 12/07/23  0752   MG 1.4*

## 2023-12-07 NOTE — ASSESSMENT & PLAN NOTE
Acute cystitis  Hypercalcemia  Hepatic encephalopathy  Patient presents from NH with acute mentation change in the setting of hypercalcemia and UTI. Concern current mentation may be new baseline. Psych following.  - continue lactulose and rifaximin  - continue ceftriaxone - completed course

## 2023-12-07 NOTE — ASSESSMENT & PLAN NOTE
The patient has hypercalcemia that is currently controlled. The patient has the following symptoms due to their hypercalcemia: polydypsia and/or polyuria, weakness, and encephalopathy. The hypercalcemia is likely due to Hyperparathyroidism. We will obtain the following labs to work up the hypercalcemia: PTH (115). Patient treated with IV fluids, calcitonin, and sensipar - now on hold due to hypocalcemia  - endocrine consulted on presentation, no longer following

## 2023-12-07 NOTE — PLAN OF CARE
Recommendations     1. Continue Regular Diet. Continue to encourage PO intake. Consider providing assistance at all meals.   2. Continue Boost Plus/VHC OR Ensure Enlive with all meals for additional calories/PRO.   3. Recommend appetite stimulant.   4. Recommend MVI + B-complex w/ vitamin C.   5. Continue daily weights.   6. RD to monitor and follow-up.     Goals: Meet % EEN/EPN by follow-up date.  Nutrition Goal Status: progressing towards goal  Communication of RD Recs: other (comment) (POC)

## 2023-12-07 NOTE — ASSESSMENT & PLAN NOTE
Pt CEC per psych recommendations for concern for SI  Lithium initially stopped on presentation due to concern for hyperparathyroid hypercalcemia secondary to lithium. Lithium restarted 12/5 as it is the only medications that works for the patient.   - psych following  - continue lithium  - continue olanzapine 5mg qhs

## 2023-12-07 NOTE — PROGRESS NOTES
"CONSULTATION LIAISON PSYCHIATRY PROGRESS NOTE    Patient Name: Marely Hamilton  MRN: [id number]  Patient Class: IP- Inpatient  Admission Date: 11/29/2023  Attending Physician: Nathaly Roberto MD      SUBJECTIVE:   Marely Hamilton is a 58 y.o. female with past psychiatric history of bipolar disorder, alcohol use disorder & past pertinent medical history of seizure disorder, alcohol induced hepatic cirrhosis presents to the ED/admitted to the hospital for AMS in setting of recent unwitnessed fall at nursing home ([facility]). [facility] Psychiatry consulted for agitation/AMS.      No acute events overnight. Pt refused Lithium and Zyprexa overnight.  VSS. No PRN's required for non-redirectable agitation.  Patient seen this morning, cognitively appears unchanged from previous few days. Reports feeling confused this morning. Oriented to person, place, year, month, time of month (early December), previous residence ([facility] for SNF); poorly oriented to situation and reason for hospitalization. Re-oriented patient.  Mood is sad, dysphoric. Reports feeling "like I'm deteriorating." Continues to voice anxiety regarding next steps, such as where she'll go after hospitalization. Discussed that these would be addressed by her hospital teams and .  She does not know why she refused her psychiatric medications overnight. Slept well. Discussed importance of taking her psychiatric medications to treat her mood disorder.  Labs reviewed, calcium minimally improved to 6.9.  No SI.         Collateral (12/5/23):  Spoke with patient's sister, Zaria, who confirms hx of Bipolar disorder. Confirms that patient did take lithium and that lithium was the only medication that seemed to be effective in managing her bipolar symptoms. Zaria feels that the patient would benefit from lithium being restarted as soon as it is safe to do so, states "without it she wont sleep and will become manic."   Regarding function, prior to 30 day " "hospitalization in July, patient was fully functional and able to tend to ADLs (drive a car, lived independently, managed expenses). Was hospitalized at [facility] requiring intubation, discharged after 29 days in early August.  Zaria is concerned the patient's cognitive baseline may have changed since this hospitalization, appears more confused most days, unclear if this is new baseline.    Spoke with patient's outpatient psychiatrist, Dr. Coates, who confirms patient is a patient of his, though last saw over a year ago. Does have hx of Bipolar Disorder, seems to respond significantly to lithium even at lower doses, though other medications have not been as effective in treating her symptoms.       OBJECTIVE:    Mental Status Exam:  General Appearance: appears stated age, well developed and nourished, adequately groomed and appropriately dressed, in no acute distress  Behavior: agitated; uncooperative; appropriate eye-contact; under good behavioral control  Involuntary Movements and Motor Activity: no abnormal involuntary movements noted; no tics, no tremors, no akathisia, no dystonia, no evidence of tardive dyskinesia; no psychomotor agitation or retardation  Gait and Station: unable to assess - patient lying down or seated  Speech and Language: lowed, profane  Mood: "I feel like I'm deteriorating"  Affect: dysphoric   Thought Process and Associations: linear, goal-directed, abstract (proverbs & similarities)  Thought Content and Perceptions:: no suicidal ideation, no homicidal ideation, no auditory hallucinations, no visual hallucinations  Sensorium and Orientation: oriented fully (to person, place, and year, day of week but not month  Recent and Remote Memory: grossly intact  Attention and Concentration: Poor, fluctuant (failed SAVEAHAART, \[facility]", Months in reverse)  Fund of Knowledge: Not assessed  Insight: limited  Judgment: limited    CAM ICU positive? no      ASSESSMENT & RECOMMENDATIONS   -Delirium   -Bipolar 1 " Disorder   -R/o neurocognitive disorder---sister reports change in cognitive baseline following recent prolonged hospitalization in July    PSYCH MEDICATIONS  Scheduled:   Continue Lithium 300mg nightly---will monitor calcium, mildly improved today though refused lithium overnight---primary holding Senispar  Benefit outweighs risk, 2 sources report lithium is only medication that works for patient, has severe mood episodes in the absence of lithium  Continue Zyprexa 5 mg QHS  Add PO thiamine, folate supplementation  Consider rechecking vitamin B1 (last 5/23), B12 (7/23), folate (7/23)---previously unremarkable  PRN haldol 2mg PO/IV q8 hrs PRN for non-redirectable agitation. If she requires a PRN please obtain EKG and monitor for Qtc changes   EKG 11/29 Qtc: 389  Lithium Hx:  Li level 0.7 on 12/2/23  Li level 1.6 on 11/29/23  Li level: 0.3 on 10/16/23  Li level: 0.1 on 10/10/23    DELIRIUM  DELIRIUM BEHAVIOR MANAGEMENT  PLEASE utilize  PRN meds first for agitation. Minimize use of PHYSICAL restraints OR have periods of being out of physical restraints if possible.  Keep window shades open and room lit during day and room dim at night in order to promote normal sleep-wake cycles  Encourage family at bedside. [place] patient often to situation, location, date.  Continue to Limit or Discontinue use of Narcotics, Benzos and Anti-cholinergic medications as they may worsen delirium.  Continue medical workup for causative etiology of Delirium.      RISK ASSESSMENT  Continue CEC, as patient is gravely disabled at this time; can plan to rescind once medically clear for discharge    FOLLOW UP  Will follow up while in house     DISPOSITION - once medically cleared:   Defer to medical team. Patient previously living at [facility]. Patients sister Zaria ([phone]) wishes to updated prior to discharge    Please contact ON CALL psychiatry service (24/7) for any acute issues that may arise.    Dr. Roman Multani  [facility]  [facility] PGY2  [facility] Psychiatry  [facility]  12/7/2023 7:15 AM        --------------------------------------------------------------------------------------------------------------------------------------------------------------------------------------------------------------------------------------    CONTINUED OBJECTIVE clinical data & findings reviewed and noted for above decision making    Current Medications:   Scheduled Meds:    ferrous sulfate  1 tablet Oral Daily    lactulose  30 g Oral TID    levetiracetam  1,000 mg Oral BID    lithium  300 mg Oral QHS    magnesium sulfate IVPB  2 g Intravenous Q2H    OLANZapine  5 mg Oral QHS    pantoprazole  40 mg Oral BID    rifAXIMin  550 mg Oral BID    vitamin D  2,000 Units Oral Daily     PRN Meds: acetaminophen, aluminum-magnesium hydroxide-simethicone, bisacodyL, melatonin, ondansetron, polyethylene glycol, simethicone, sodium chloride 0.9%    Allergies:   Review of patient's allergies indicates:   Allergen Reactions    Sulfa (sulfonamide antibiotics) Rash    Codeine Nausea And Vomiting       Vitals  Vitals:    12/07/23 1117   BP:    Pulse: 76   Resp:    Temp:        Labs/Imaging/Studies:  Recent Results (from the past 24 hour(s))   Magnesium    Collection Time: 12/07/23  7:52 AM   Result Value Ref Range    Magnesium 1.4 (L) 1.6 - 2.6 mg/dL   Phosphorus    Collection Time: 12/07/23  7:52 AM   Result Value Ref Range    Phosphorus 2.6 (L) 2.7 - 4.5 mg/dL   Basic Metabolic Panel    Collection Time: 12/07/23  7:52 AM   Result Value Ref Range    Sodium 133 (L) 136 - 145 mmol/L    Potassium 4.2 3.5 - 5.1 mmol/L    Chloride 105 95 - 110 mmol/L    CO2 23 23 - 29 mmol/L    Glucose 119 (H) 70 - 110 mg/dL    BUN 9 6 - 20 mg/dL    Creatinine 0.5 0.5 - 1.4 mg/dL    Calcium 6.9 (LL) 8.7 - 10.5 mg/dL    Anion Gap 5 (L) 8 - 16 mmol/L    eGFR >60.0 >60 mL/min/1.73 m^2   Hepatic Function Panel    Collection Time: 12/07/23  7:52 AM   Result  Value Ref Range    Total Protein 4.5 (L) 6.0 - 8.4 g/dL    Albumin 1.8 (L) 3.5 - 5.2 g/dL    Total Bilirubin 3.4 (H) 0.1 - 1.0 mg/dL    Bilirubin, Direct 1.5 (H) 0.1 - 0.3 mg/dL    AST 78 (H) 10 - 40 U/L    ALT 62 (H) 10 - 44 U/L    Alkaline Phosphatase 192 (H) 55 - 135 U/L     Imaging Results              X-Ray Pelvis Routine AP (Final result)  Result time 11/29/23 06:20:52      Final result by Mumtaz Penny MD (11/29/23 06:20:52)                   Impression:      Noting limitations above, no convincing evidence of acute displaced fracture or dislocation.      Electronically signed by: Mumtaz Penny  Date:    11/29/2023  Time:    06:20               Narrative:    EXAMINATION:  XR PELVIS ROUTINE AP    CLINICAL HISTORY:  fall;    TECHNIQUE:  AP view of the pelvis was performed.    COMPARISON:  None.    FINDINGS:  Examination limited by suboptimal patient positioning and overlying bowel gas and stool.    Noting this, no definite evidence of acute displaced fracture or dislocation is identified.  Status post left total hip arthroplasty.  No definite hardware complication single provided view.    Partially imaged IVC filter.    Degenerative findings are noted involving the spine, sacroiliac joints, pubic symphysis, and right hip joint.  Moderate to large volume colonic stool partially imaged.    Phleboliths.    No definite radiopaque foreign body.                                       X-Ray Chest AP Portable (Final result)  Result time 11/29/23 05:29:50      Final result by Rangel Floyd MD (11/29/23 05:29:50)                   Impression:      As above.      Electronically signed by: Rangel Floyd MD  Date:    11/29/2023  Time:    05:29               Narrative:    EXAMINATION:  XR CHEST AP PORTABLE    CLINICAL HISTORY:  cough;    TECHNIQUE:  Single frontal view of the chest was performed.    COMPARISON:  09/16/2023    FINDINGS:  Cardiac monitoring leads overlie the chest.  The cardiac silhouette is not  significantly enlarged.  There is stable mild elevation of the right hemidiaphragm.  There is probable subsegmental atelectasis or scarring at the right lung base.  The remaining lungs demonstrate chronic coarse interstitial attenuation.  No new large confluent airspace consolidation appreciated.  No significant volume of pleural fluid or pneumothorax identified.  The visualized osseous structures demonstrate mild degenerative changes.  Postsurgical changes are noted of the right upper abdomen as well as possible cholelithiasis.                                       CT Head Without Contrast (Final result)  Result time 11/29/23 04:28:42      Final result by Jered Mandujano MD (11/29/23 04:28:42)                   Impression:      No evidence of acute intracranial abnormality.    Electronically signed by resident: Ce Vela  Date:    11/29/2023  Time:    04:19    Electronically signed by: Jered Mandujano MD  Date:    11/29/2023  Time:    04:28               Narrative:    EXAMINATION:  CT HEAD WITHOUT CONTRAST    CLINICAL HISTORY:  Head trauma, abnormal mental status (Age 19-64y);    TECHNIQUE:  Low dose axial CT images obtained throughout the head without the use of intravenous contrast.  Axial, sagittal and coronal reconstructions were performed.    COMPARISON:  CT 07/05/2023, 06/22/2023    FINDINGS:  Mild generalized cerebral volume loss with compensatory prominence of the ventricles and sulci.  There is patchy hypoattenuation through the supratentorial white matter which is nonspecific but may represent chronic microvascular ischemic changes. No parenchymal mass effect, midline shift, hemorrhage, edema or major vascular distribution infarct.    Ventricles and sulci are normal in size for age without evidence of hydrocephalus.    No extra-axial blood or fluid collections.    No displaced calvarial fracture.    Mild mucosal thickening of the paranasal sinuses.  The mastoid air cells are clear.

## 2023-12-07 NOTE — PLAN OF CARE
Problem: Violence Risk or Actual  Goal: Anger and Impulse Control  Outcome: Ongoing, Progressing  Intervention: Minimize Safety Risk  Flowsheets (Taken 12/7/2023 0014)  Behavior Management:   security enhancements provided   behavioral plan developed  Sensory Stimulation Regulation:   quiet environment promoted   lighting decreased   auditory stimulation minimized  Enhanced Safety Measures:  at bedside  Intervention: Promote Self-Control  Flowsheets (Taken 12/7/2023 0014)  Supportive Measures:   active listening utilized   relaxation techniques promoted     Problem: Adult Inpatient Plan of Care  Goal: Optimal Comfort and Wellbeing  Outcome: Ongoing, Progressing  Intervention: Monitor Pain and Promote Comfort  Flowsheets (Taken 12/7/2023 0014)  Pain Management Interventions:   medication offered   position adjusted   quiet environment facilitated     Problem: Impaired Wound Healing  Goal: Optimal Wound Healing  Outcome: Ongoing, Progressing  Intervention: Promote Wound Healing  Flowsheets (Taken 12/7/2023 0014)  Activity Management: Rolling - L1  Pain Management Interventions:   medication offered   position adjusted   quiet environment facilitated     Problem: Suicide Risk  Goal: Absence of Self-Harm  Outcome: Ongoing, Progressing  Intervention: Assess Risk to Self and Maintain Safety  Flowsheets (Taken 12/7/2023 0014)  Behavior Management:   security enhancements provided   behavioral plan developed  Self-Harm Prevention: environmental self-harm risks assessed

## 2023-12-08 VITALS
SYSTOLIC BLOOD PRESSURE: 110 MMHG | TEMPERATURE: 97 F | HEIGHT: 62 IN | HEART RATE: 88 BPM | DIASTOLIC BLOOD PRESSURE: 57 MMHG | WEIGHT: 129 LBS | RESPIRATION RATE: 17 BRPM | OXYGEN SATURATION: 93 % | BODY MASS INDEX: 23.74 KG/M2

## 2023-12-08 LAB
ALBUMIN SERPL BCP-MCNC: 1.8 G/DL (ref 3.5–5.2)
ALP SERPL-CCNC: 213 U/L (ref 55–135)
ALT SERPL W/O P-5'-P-CCNC: 60 U/L (ref 10–44)
ANION GAP SERPL CALC-SCNC: 6 MMOL/L (ref 8–16)
AST SERPL-CCNC: 79 U/L (ref 10–40)
BILIRUB SERPL-MCNC: 3.7 MG/DL (ref 0.1–1)
BUN SERPL-MCNC: 10 MG/DL (ref 6–20)
CA-I BLDV-SCNC: 1 MMOL/L (ref 1.06–1.42)
CALCIUM SERPL-MCNC: 7 MG/DL (ref 8.7–10.5)
CHLORIDE SERPL-SCNC: 105 MMOL/L (ref 95–110)
CO2 SERPL-SCNC: 24 MMOL/L (ref 23–29)
CREAT SERPL-MCNC: 0.4 MG/DL (ref 0.5–1.4)
EST. GFR  (NO RACE VARIABLE): >60 ML/MIN/1.73 M^2
GLUCOSE SERPL-MCNC: 83 MG/DL (ref 70–110)
MAGNESIUM SERPL-MCNC: 1.6 MG/DL (ref 1.6–2.6)
PHOSPHATE SERPL-MCNC: 3 MG/DL (ref 2.7–4.5)
POTASSIUM SERPL-SCNC: 4.6 MMOL/L (ref 3.5–5.1)
PROT SERPL-MCNC: 4.5 G/DL (ref 6–8.4)
SODIUM SERPL-SCNC: 135 MMOL/L (ref 136–145)

## 2023-12-08 PROCEDURE — 82330 ASSAY OF CALCIUM: CPT | Performed by: STUDENT IN AN ORGANIZED HEALTH CARE EDUCATION/TRAINING PROGRAM

## 2023-12-08 PROCEDURE — 83735 ASSAY OF MAGNESIUM: CPT | Performed by: STUDENT IN AN ORGANIZED HEALTH CARE EDUCATION/TRAINING PROGRAM

## 2023-12-08 PROCEDURE — 25000003 PHARM REV CODE 250: Performed by: STUDENT IN AN ORGANIZED HEALTH CARE EDUCATION/TRAINING PROGRAM

## 2023-12-08 PROCEDURE — 25000003 PHARM REV CODE 250: Performed by: PHYSICIAN ASSISTANT

## 2023-12-08 PROCEDURE — 36415 COLL VENOUS BLD VENIPUNCTURE: CPT | Performed by: STUDENT IN AN ORGANIZED HEALTH CARE EDUCATION/TRAINING PROGRAM

## 2023-12-08 PROCEDURE — 80053 COMPREHEN METABOLIC PANEL: CPT | Performed by: STUDENT IN AN ORGANIZED HEALTH CARE EDUCATION/TRAINING PROGRAM

## 2023-12-08 PROCEDURE — 84100 ASSAY OF PHOSPHORUS: CPT | Performed by: STUDENT IN AN ORGANIZED HEALTH CARE EDUCATION/TRAINING PROGRAM

## 2023-12-08 RX ORDER — LANOLIN ALCOHOL/MO/W.PET/CERES
100 CREAM (GRAM) TOPICAL DAILY
Qty: 30 TABLET | Refills: 0 | Status: SHIPPED | OUTPATIENT
Start: 2023-12-08 | End: 2024-01-05

## 2023-12-08 RX ORDER — LACTULOSE 10 G/15ML
30 SOLUTION ORAL; RECTAL 3 TIMES DAILY
Qty: 4050 ML | Refills: 0 | Status: ON HOLD | OUTPATIENT
Start: 2023-12-08 | End: 2024-01-17

## 2023-12-08 RX ORDER — LITHIUM CARBONATE 300 MG/1
300 TABLET, FILM COATED, EXTENDED RELEASE ORAL NIGHTLY
Qty: 30 TABLET | Refills: 6 | Status: ON HOLD | OUTPATIENT
Start: 2023-12-08 | End: 2024-01-17

## 2023-12-08 RX ORDER — OLANZAPINE 5 MG/1
5 TABLET ORAL NIGHTLY
Qty: 30 TABLET | Refills: 6 | Status: ON HOLD | OUTPATIENT
Start: 2023-12-08 | End: 2024-01-17

## 2023-12-08 RX ORDER — PANTOPRAZOLE SODIUM 40 MG/1
40 FOR SUSPENSION ORAL 2 TIMES DAILY
Qty: 60 PACKET | Refills: 11 | Status: SHIPPED | OUTPATIENT
Start: 2023-12-08 | End: 2024-12-07

## 2023-12-08 RX ORDER — ALPRAZOLAM 0.25 MG/1
0.25 TABLET ORAL 2 TIMES DAILY PRN
Qty: 60 TABLET | Refills: 0 | Status: ON HOLD | OUTPATIENT
Start: 2023-12-08 | End: 2024-01-17

## 2023-12-08 RX ORDER — LITHIUM CARBONATE 300 MG/1
300 TABLET, FILM COATED, EXTENDED RELEASE ORAL NIGHTLY
Qty: 30 TABLET | Refills: 11 | Status: SHIPPED | OUTPATIENT
Start: 2023-12-08 | End: 2023-12-08 | Stop reason: SDUPTHER

## 2023-12-08 RX ORDER — CHOLECALCIFEROL (VITAMIN D3) 50 MCG
2000 TABLET ORAL DAILY
Start: 2023-12-09 | End: 2024-01-05

## 2023-12-08 RX ADMIN — RIFAXIMIN 550 MG: 550 TABLET ORAL at 09:12

## 2023-12-08 RX ADMIN — FERROUS SULFATE TAB EC 325 MG (65 MG FE EQUIVALENT) 1 EACH: 325 (65 FE) TABLET DELAYED RESPONSE at 09:12

## 2023-12-08 RX ADMIN — PANTOPRAZOLE SODIUM 40 MG: 40 GRANULE, DELAYED RELEASE ORAL at 09:12

## 2023-12-08 RX ADMIN — CHOLECALCIFEROL TAB 25 MCG (1000 UNIT) 2000 UNITS: 25 TAB at 09:12

## 2023-12-08 RX ADMIN — LACTULOSE 30 G: 20 SOLUTION ORAL at 09:12

## 2023-12-08 RX ADMIN — LEVETIRACETAM 1000 MG: 500 SOLUTION ORAL at 09:12

## 2023-12-08 NOTE — PLAN OF CARE
[facility] - Med Surg  Discharge Final Note    Primary Care Provider: Viktor Ross MD    Expected Discharge Date: 12/8/2023      Patient returned to [facility] via wheelchair van.  Discharge Plan A and Plan B have been determined by review of patient's clinical status, future medical and therapeutic needs, and coverage/benefits for post-acute care in coordination with multidisciplinary team members.    Final Discharge Note (most recent)       Final Note - 12/08/23 1617          Final Note    Assessment Type Final Discharge Note (P)      Anticipated Discharge Disposition Intermediate Care Facility for senior living or Supportive Care (P)         Post-Acute Status    Discharge Delays None known at this time (P)                      Important Message from Medicare  Important Message from Medicare regarding Discharge Appeal Rights: Given to patient/caregiver, Explained to patient/caregiver, Signed/date by patient/caregiver     Date IMM was signed: 12/04/23  Time IMM was signed: 1051    Contact Info       Viktor Ross MD   Specialty: Family Medicine   Relationship: PCP - General    [facility] JED DOSHI [address] [place], [place] [address] [address]   Phone: [phone]       Next Steps: Go on 12/6/2023    Instructions: hospital follow up at 10:30 am            NORMA Blanton  Case Management  [phone]

## 2023-12-08 NOTE — PLAN OF CARE
Problem: Violence Risk or Actual  Goal: Anger and Impulse Control  12/8/2023 1542 by Bossman Grijalva RN  Outcome: Adequate for Care Transition  12/8/2023 1134 by Bossman Grijalva RN  Outcome: Ongoing, Progressing     Problem: Adult Inpatient Plan of Care  Goal: Plan of Care Review  12/8/2023 1542 by Bossman Grijalva RN  Outcome: Adequate for Care Transition  12/8/2023 1134 by Bossman Grijalva RN  Outcome: Ongoing, Progressing  Goal: Patient-Specific Goal (Individualized)  12/8/2023 1542 by Bossman Grijalva RN  Outcome: Adequate for Care Transition  12/8/2023 1134 by Bossman Grijalva RN  Outcome: Ongoing, Progressing  Goal: Absence of Hospital-Acquired Illness or Injury  12/8/2023 1542 by Bossman Grijalva RN  Outcome: Adequate for Care Transition  12/8/2023 1134 by Bossman Grijalva RN  Outcome: Ongoing, Progressing  Goal: Optimal Comfort and Wellbeing  12/8/2023 1542 by Bossman Grijalva RN  Outcome: Adequate for Care Transition  12/8/2023 1134 by Bossman Grijalva RN  Outcome: Ongoing, Progressing  Goal: Readiness for Transition of Care  12/8/2023 1542 by Bossman Grijalva RN  Outcome: Adequate for Care Transition  12/8/2023 1134 by Bossman Grijalva RN  Outcome: Ongoing, Progressing     Problem: Infection  Goal: Absence of Infection Signs and Symptoms  12/8/2023 1542 by Bossman Grijalva RN  Outcome: Adequate for Care Transition  12/8/2023 1134 by Bossman Grijalva RN  Outcome: Ongoing, Progressing     Problem: Impaired Wound Healing  Goal: Optimal Wound Healing  12/8/2023 1542 by Bossman Grijalva RN  Outcome: Adequate for Care Transition  12/8/2023 1134 by Bossman Grijalva RN  Outcome: Ongoing, Progressing     Problem: Skin Injury Risk Increased  Goal: Skin Health and Integrity  12/8/2023 1542 by Bossman Grijalva RN  Outcome: Adequate for Care Transition  12/8/2023 1134 by Bossman Grijalva RN  Outcome: Ongoing, Progressing     Problem: Suicide Risk  Goal: Absence of Self-Harm  12/8/2023 1542 by Bossman Grijalva, RN  Outcome: Adequate  for Care Transition  12/8/2023 1134 by Bossman Grijalva, RN  Outcome: Ongoing, Progressing

## 2023-12-08 NOTE — NURSING
Pt refused nightly medications except her anticonvulsant (levetractem). Mariah scales N.P notified.

## 2023-12-08 NOTE — NURSING
Report called to [facility] Nurse [facility], no concerns raised at this time, pt iv removed and prepped for discharge.

## 2023-12-08 NOTE — PLAN OF CARE
Set up transportation for 4:30 and informed patient's nurse that report can be called in to [phone] to the nurse on the 900 keating. Patient going to room 910.       NORMA Blanton  Case Management  [phone]

## 2023-12-08 NOTE — PLAN OF CARE
Sent over nursing home orders via [employer]. Awaiting hard scripts and then will set up transpot.      NORMA Blanton  Case Management  [phone]

## 2023-12-08 NOTE — PLAN OF CARE
Problem: Violence Risk or Actual  Goal: Anger and Impulse Control  Outcome: Ongoing, Progressing     Problem: Adult Inpatient Plan of Care  Goal: Plan of Care Review  Outcome: Ongoing, Progressing  Goal: Patient-Specific Goal (Individualized)  Outcome: Ongoing, Progressing  Goal: Absence of Hospital-Acquired Illness or Injury  Outcome: Ongoing, Progressing  Intervention: Identify and Manage Fall Risk  Flowsheets (Taken 12/8/2023 0331)  Safety Promotion/Fall Prevention:   assistive device/personal item within reach   room near unit station   lighting adjusted   side rails raised x 2  Intervention: Prevent Skin Injury  Flowsheets (Taken 12/8/2023 0331)  Body Position: position changed independently  Skin Protection:   tubing/devices free from skin contact   adhesive use limited  Goal: Optimal Comfort and Wellbeing  Outcome: Ongoing, Progressing  Goal: Readiness for Transition of Care  Outcome: Ongoing, Progressing

## 2023-12-08 NOTE — PROGRESS NOTES
"CONSULTATION LIAISON PSYCHIATRY PROGRESS NOTE    Patient Name: Marely Hamilton  MRN: [id number]  Patient Class: IP- Inpatient  Admission Date: 11/29/2023  Attending Physician: Nathaly Roberto MD      SUBJECTIVE:   Marely Hamilton is a 58 y.o. female with past psychiatric history of bipolar disorder, alcohol use disorder & past pertinent medical history of seizure disorder, alcohol induced hepatic cirrhosis presents to the ED/admitted to the hospital for AMS in setting of recent unwitnessed fall at nursing home ([facility]). [facility] consulted for agitation/AMS.      No acute events overnight. Pt refused Lithium and Zyprexa overnight, in addition to other non-psychiatric medications. No PRNs required for non-redirectable agitation.  VSS this am. Calcium trending upward slowly, 7.0 this am.    This morning, she reports feeling confused as per previous interviews. Orientation remains unchanged, oriented to person, place, month, year, date. Remains somewhat disoriented to situation, states "my sister had me dropped here" when asked about situation surrounding admission. Mood remains dysphoric, patient reports needing her psychiatric medications. I have informed her that they are ordered, though she continues to refuse them. Pt states reason for refusal is that they were "given to me with pudding instead of a glass of cold water." Discussed asking her nurse for a cup of water if she would like to take them with water and it is not present. She is understanding. Still limited participation in regard to tests of concentration. Able to spell WORLD forwards but refuses to do so in reverse, citing feeling confused. No SI.  Discussed continuing current medications, she reports understanding.         Collateral (12/5/23):  Spoke with patient's sister, Zaria, who confirms hx of Bipolar disorder. Confirms that patient did take lithium and that lithium was the only medication that seemed to be effective in managing her bipolar " "symptoms. Zaria feels that the patient would benefit from lithium being restarted as soon as it is safe to do so, states "without it she wont sleep and will become manic."   Regarding function, prior to 30 day hospitalization in July, patient was fully functional and able to tend to ADLs (drive a car, lived independently, managed expenses). Was hospitalized at [facility] requiring intubation, discharged after 29 days in early August.  Zaria is concerned the patient's cognitive baseline may have changed since this hospitalization, appears more confused most days, unclear if this is new baseline.    Spoke with patient's outpatient psychiatrist, Dr. Coates, who confirms patient is a patient of his, though last saw over a year ago. Does have hx of Bipolar Disorder, seems to respond significantly to lithium even at lower doses, though other medications have not been as effective in treating her symptoms.       OBJECTIVE:    Mental Status Exam:  General Appearance: appears stated age, well developed and nourished, adequately groomed and appropriately dressed, in no acute distress  Behavior: agitated; uncooperative; appropriate eye-contact; under good behavioral control  Involuntary Movements and Motor Activity: no abnormal involuntary movements noted; no tics, no tremors, no akathisia, no dystonia, no evidence of tardive dyskinesia; no psychomotor agitation or retardation  Gait and Station: unable to assess - patient lying down or seated  Speech and Language: lowed, profane  Mood: "I feel like I'm deteriorating"  Affect: dysphoric   Thought Process and Associations: linear, goal-directed, abstract (proverbs & similarities)  Thought Content and Perceptions:: no suicidal ideation, no homicidal ideation, no auditory hallucinations, no visual hallucinations  Sensorium and Orientation: oriented fully (to person, place, and year, day of week but not month  Recent and Remote Memory: grossly intact  Attention and Concentration: " Poor, fluctuant (failed [facility], Months in reverse)  Fund of Knowledge: Not assessed  Insight: limited  Judgment: limited    CAM ICU positive? no      ASSESSMENT & RECOMMENDATIONS   -Delirium, resolved  -Bipolar 1 Disorder   -Unspecified Neurocognitive Disorder, suspect pt is at new cognitive baseline; sister also reported concern for change in cognitive baseline following prolonged hospitalization in July     PSYCH MEDICATIONS  Scheduled:   Continue Lithium 300mg nightly---will monitor calcium, mildly improved today though refused lithium overnight---primary holding Senispar  Benefit outweighs risk, 2 sources report lithium is only medication that works for patient, has severe mood episodes in the absence of lithium  Continue Zyprexa 5 mg QHS  Consider  PO thiamine, folate supplementation  Consider rechecking vitamin B1 (last 5/23), B12 (7/23), folate (7/23)---previously unremarkable  PRN haldol 2mg PO/IV q8 hrs PRN for non-redirectable agitation. If she requires a PRN please obtain EKG and monitor for Qtc changes   EKG 11/29 Qtc: 389  Lithium Hx:  Li level 0.7 on 12/2/23  Li level 1.6 on 11/29/23  Li level: 0.3 on 10/16/23  Li level: 0.1 on 10/10/23    DELIRIUM  DELIRIUM BEHAVIOR MANAGEMENT  PLEASE utilize  PRN meds first for agitation. Minimize use of PHYSICAL restraints OR have periods of being out of physical restraints if possible.  Keep window shades open and room lit during day and room dim at night in order to promote normal sleep-wake cycles  Encourage family at bedside. [place] patient often to situation, location, date.  Continue to Limit or Discontinue use of Narcotics, Benzos and Anti-cholinergic medications as they may worsen delirium.  Continue medical workup for causative etiology of Delirium.      RISK ASSESSMENT  Continue CEC, can plan to rescind once medically clear for discharge    FOLLOW UP  Will follow up while in house     DISPOSITION - once medically cleared:   Defer to medical  team. Patient previously living at [facility]. Patients sister Zaria ([phone]) wishes to updated prior to discharge    Please contact ON CALL psychiatry service (24/7) for any acute issues that may arise.    Dr. Trent Matthew Wiedemann  [facility] PGY2  [facility] Psych[facility]  [facility]  12/8/2023 7:15 AM        --------------------------------------------------------------------------------------------------------------------------------------------------------------------------------------------------------------------------------------    CONTINUED OBJECTIVE clinical data & findings reviewed and noted for above decision making    Current Medications:   Scheduled Meds:    ferrous sulfate  1 tablet Oral Daily    lactulose  30 g Oral TID    levetiracetam  1,000 mg Oral BID    lithium  300 mg Oral QHS    OLANZapine  5 mg Oral QHS    pantoprazole  40 mg Oral BID    rifAXIMin  550 mg Oral BID    vitamin D  2,000 Units Oral Daily     PRN Meds: acetaminophen, aluminum-magnesium hydroxide-simethicone, bisacodyL, melatonin, ondansetron, polyethylene glycol, simethicone, sodium chloride 0.9%    Allergies:   Review of patient's allergies indicates:   Allergen Reactions    Sulfa (sulfonamide antibiotics) Rash    Codeine Nausea And Vomiting       Vitals  Vitals:    12/08/23 0659   BP: (!) 94/50   Pulse: 79   Resp: 18   Temp: 98.4 °F (36.9 °C)       Labs/Imaging/Studies:  Recent Results (from the past 24 hour(s))   Magnesium    Collection Time: 12/08/23  4:39 AM   Result Value Ref Range    Magnesium 1.6 1.6 - 2.6 mg/dL   Phosphorus    Collection Time: 12/08/23  4:39 AM   Result Value Ref Range    Phosphorus 3.0 2.7 - 4.5 mg/dL   Comprehensive Metabolic Panel    Collection Time: 12/08/23  4:39 AM   Result Value Ref Range    Sodium 135 (L) 136 - 145 mmol/L    Potassium 4.6 3.5 - 5.1 mmol/L    Chloride 105 95 - 110 mmol/L    CO2 24 23 - 29 mmol/L    Glucose 83 70 - 110 mg/dL    BUN 10 6 - 20 mg/dL     Creatinine 0.4 (L) 0.5 - 1.4 mg/dL    Calcium 7.0 (L) 8.7 - 10.5 mg/dL    Total Protein 4.5 (L) 6.0 - 8.4 g/dL    Albumin 1.8 (L) 3.5 - 5.2 g/dL    Total Bilirubin 3.7 (H) 0.1 - 1.0 mg/dL    Alkaline Phosphatase 213 (H) 55 - 135 U/L    AST 79 (H) 10 - 40 U/L    ALT 60 (H) 10 - 44 U/L    eGFR >60.0 >60 mL/min/1.73 m^2    Anion Gap 6 (L) 8 - 16 mmol/L   Calcium, ionized    Collection Time: 12/08/23  4:39 AM   Result Value Ref Range    Ionized Calcium 1.00 (L) 1.06 - 1.42 mmol/L     Imaging Results              X-Ray Pelvis Routine AP (Final result)  Result time 11/29/23 06:20:52      Final result by Mumtaz Penny MD (11/29/23 06:20:52)                   Impression:      Noting limitations above, no convincing evidence of acute displaced fracture or dislocation.      Electronically signed by: Mumtaz Penny  Date:    11/29/2023  Time:    06:20               Narrative:    EXAMINATION:  XR PELVIS ROUTINE AP    CLINICAL HISTORY:  fall;    TECHNIQUE:  AP view of the pelvis was performed.    COMPARISON:  None.    FINDINGS:  Examination limited by suboptimal patient positioning and overlying bowel gas and stool.    Noting this, no definite evidence of acute displaced fracture or dislocation is identified.  Status post left total hip arthroplasty.  No definite hardware complication single provided view.    Partially imaged IVC filter.    Degenerative findings are noted involving the spine, sacroiliac joints, pubic symphysis, and right hip joint.  Moderate to large volume colonic stool partially imaged.    Phleboliths.    No definite radiopaque foreign body.                                       X-Ray Chest AP Portable (Final result)  Result time 11/29/23 05:29:50      Final result by Rangel Floyd MD (11/29/23 05:29:50)                   Impression:      As above.      Electronically signed by: Rangel Floyd MD  Date:    11/29/2023  Time:    05:29               Narrative:    EXAMINATION:  XR CHEST AP  PORTABLE    CLINICAL HISTORY:  cough;    TECHNIQUE:  Single frontal view of the chest was performed.    COMPARISON:  09/16/2023    FINDINGS:  Cardiac monitoring leads overlie the chest.  The cardiac silhouette is not significantly enlarged.  There is stable mild elevation of the right hemidiaphragm.  There is probable subsegmental atelectasis or scarring at the right lung base.  The remaining lungs demonstrate chronic coarse interstitial attenuation.  No new large confluent airspace consolidation appreciated.  No significant volume of pleural fluid or pneumothorax identified.  The visualized osseous structures demonstrate mild degenerative changes.  Postsurgical changes are noted of the right upper abdomen as well as possible cholelithiasis.                                       CT Head Without Contrast (Final result)  Result time 11/29/23 04:28:42      Final result by Jered Mandujano MD (11/29/23 04:28:42)                   Impression:      No evidence of acute intracranial abnormality.    Electronically signed by resident: Ce Vela  Date:    11/29/2023  Time:    04:19    Electronically signed by: Jered Mandujano MD  Date:    11/29/2023  Time:    04:28               Narrative:    EXAMINATION:  CT HEAD WITHOUT CONTRAST    CLINICAL HISTORY:  Head trauma, abnormal mental status (Age 19-64y);    TECHNIQUE:  Low dose axial CT images obtained throughout the head without the use of intravenous contrast.  Axial, sagittal and coronal reconstructions were performed.    COMPARISON:  CT 07/05/2023, 06/22/2023    FINDINGS:  Mild generalized cerebral volume loss with compensatory prominence of the ventricles and sulci.  There is patchy hypoattenuation through the supratentorial white matter which is nonspecific but may represent chronic microvascular ischemic changes. No parenchymal mass effect, midline shift, hemorrhage, edema or major vascular distribution infarct.    Ventricles and sulci are normal in size for age  without evidence of hydrocephalus.    No extra-axial blood or fluid collections.    No displaced calvarial fracture.    Mild mucosal thickening of the paranasal sinuses.  The mastoid air cells are clear.

## 2023-12-08 NOTE — DISCHARGE SUMMARY
[facility] Medicine  Discharge Summary      Patient Name: Marely Hamilton  MRN: [id number]  BUTCH: [id number]  Patient Class: IP- Inpatient  Admission Date: 11/29/2023  Hospital Length of Stay: 9 days  Discharge Date and Time: 12/8/2023  6:05 PM  Attending Physician: Nathaly Roberto MD   Discharging Provider: Nathaly Roberto MD  Primary Care Provider: Viktor Ross MD  [facility] Medicine Team: [facility] Nathaly Roberto MD  Primary Care Team: [facility]    HPI:   Patient is a 58-year-old woman with EtOH cirrhosis, bipolar disorder, anemia, thrombocytopenia, cholelithiasis, DVT with IVC filter in place, GI bleed, ESBL UTI history, seizure disorder, [facility] with encephalopathy with unwitnessed fall. She was found down at her nursing home. Per chart review patient is normally A&O x3 1 month prior.  She does not take blood thinners. Patient denies any pain at this time. She reports she has been urinating frequently though difficult to ascertain history. She reports the last thing she remembers today before she arrived to the hospital was using the bathroom. She denies recent illness/fever/chills/nausea/vomiting/diarrhea/constipation.       * No surgery found *      Hospital Course:   In the ED, vitals stable. Intake labs remarkable for Na 146, BUN 37, Cr 1.4, Calcium 14.2 (corrected 15), , Tbili 4.8, , , Ammonia 51, lipase 69, lactic 2.3, UA with 67 WBCs. She was given 2L IVF, calcitonin, ceftriaxone and admitted for further management. Continued on lactulose for treatment of HE, rocephin for treatment of UTI (completed). She was started on IVF and calcitonin. Endocrine consulted for hypercalcemia. Hyperparathyroid hypercalcemia. Lithium level elevated. Psychiatry consulted and lithium stopped. Calcium downtrended with these interventions. By 12/1, patient hypocalcemic. Lithium restarted 12/5. Correct calcium borderline low. Plan to continue  lithium at 300mg daily. Patient likely at new mental baseline. She is not oriented to place, time, or situation. She is aware of this confusion. Patient stable for discharge 12/8 back to nursing facility.      Goals of Care Treatment Preferences:  Code Status: Full Code    Health care agent: Pt's sisters are SARAH. Call Zaria bernard.  Health care agent number: No value filed.                   Consults:   Consults (From admission, onward)          Status Ordering Provider     Inpatient consult to Psychiatry  Once        Provider:  (Not yet assigned)    Completed [facility]     Inpatient consult to PICC team ([email or web address])  Once        Provider:  (Not yet assigned)    Completed [facility]     Inpatient consult to Registered Dietitian/Nutritionist  Once        Provider:  (Not yet assigned)    Completed DARCY DUNAWAY JR     Inpatient consult to Endocrinology  Once        Provider:  (Not yet assigned)    Completed DARCY DUNAWAY JR            Neuro  * Acute metabolic encephalopathy  Acute cystitis  Hypercalcemia  Hepatic encephalopathy  Patient presents from NH with acute mentation change in the setting of hypercalcemia and UTI. Concern current mentation may be new baseline. Psych following.  - continue lactulose and rifaximin  - continue ceftriaxone - completed course      Psychiatric  Bipolar 1 disorder, depressed, severe  Pt CEC per psych recommendations for concern for SI on presentation  Lithium initially stopped on presentation due to concern for hyperparathyroid hypercalcemia secondary to lithium. Lithium restarted 12/5 as it is the only medications that works for the patient.   - continue lithium 300mg  - continue olanzapine 5mg qhs      Renal/  Hypernatremia  Resolved  Patient has hypernatremia which is controlled. The hypernatremia is due to Dehydration. We will aim to correct the sodium by 8-10mEq in 24 hours. We will correct their hypernatremia with Select IV  fluids: D5W/0.45 NaCl at a rate of 75 ml/hr. The patient's sodium results have been reviewed and are listed below.  Recent Labs   Lab 12/08/23  0439   *     Hold lasix and spironolactone    Hypomagnesemia  Patient has Abnormal Magnesium: hypomagnesemia. Will continue to monitor electrolytes closely. Will replace the affected electrolytes and repeat labs to be done after interventions completed. The patient's magnesium results have been reviewed and are listed below.  Recent Labs   Lab 12/08/23  0439   MG 1.6        Hypocalcemia  Patient has hypocalcemia due to  iatrogenic after treatment for hypercalcemia   which is currently controlled, and has been confirmed with ionized and/or corrected calcium.  The latest calcium labs have been reviewed and are listed below.    - lithium restarted 12/5        Hypercalcemia  The patient has hypercalcemia that is currently controlled. The patient has the following symptoms due to their hypercalcemia: polydypsia and/or polyuria, weakness, and encephalopathy. The hypercalcemia is likely due to Hyperparathyroidism. We will obtain the following labs to work up the hypercalcemia: PTH (115). Patient treated with IV fluids, calcitonin, and sensipar - now on hold due to hypocalcemia  - endocrine consulted on presentation, no longer following    Hematology  Presence of IVC filter  - history of bleeds  - not on AC      Thrombocytopenia  Patient was found to have thrombocytopenia, the likely etiology is secondary to cirrhosis/portal hypertension, will monitor the platelets Daily. Will transfuse if platelet count is <10k. Hold DVT prophylaxis if platelets are <50k. The patient's platelet results have been reviewed and are listed below.          Endocrine  Body mass index (BMI) less than 19  - BMI 16.33  - dietary consulted      Severe protein-calorie malnutrition  Nutrition consulted. Most recent weight and BMI monitored-     Measurements:  Wt Readings from Last 1 Encounters:    11/30/23 58.5 kg (129 lb)   Body mass index is 23.59 kg/m².  - dietary consulted, history  Weight Loss (Malnutrition): other (see comments) (29% x 1 month)  Energy Intake (Malnutrition): less than 75% for greater than or equal to 3 months  Subcutaneous Fat (Malnutrition): severe depletion  Muscle Mass (Malnutrition): severe depletion    Interventions/Recommendations (treatment strategy):  1. Continue Regular Diet. Continue to encourage PO intake. Consider providing assistance at all meals. 2. Continue Boost Plus/VHC OR Ensure Enlive with all meals for additional calories/PRO. 3. Recommend appetite stimulant. 4. Recommend MVI + B-complex w/ vitamin C. 5. Continue daily weights. 6. RD to monitor and follow-up.- boost TID      GI  Cirrhosis, Capri's  Patient with known Cirrhosis. Co-morbidities are present and inclusive of hepatic encephalopathy and malnutrition.  MELD-Na score calculated; MELD 3.0: 22 at 12/1/2023 11:16 PM  MELD-Na: 20 at 12/1/2023 11:16 PM  Calculated from:  Serum Creatinine: 0.7 mg/dL (Using min of 1 mg/dL) at 12/1/2023 11:16 PM  Serum Sodium: 136 mmol/L at 12/1/2023 11:16 PM  Total Bilirubin: 3.9 mg/dL at 12/1/2023 11:16 PM  Serum Albumin: 2.1 g/dL at 12/1/2023 11:16 PM  INR(ratio): 1.9 at 11/29/2023  8:21 AM  Age at listing (hypothetical): 58 years  Sex: Female at 12/1/2023 11:16 PM      Continue chronic meds. Will avoid any hepatotoxic meds, and monitor CBC/CMP/INR for synthetic function.   Continue lactulose  Continue Rifaximin    Orthopedic  Fall  - CTH, XR pelvis unremarkable  - no bony tenderness        Final Active Diagnoses:    Diagnosis Date Noted POA    PRINCIPAL PROBLEM:  Acute metabolic encephalopathy [G93.41] 05/19/2023 Yes    Bipolar 1 disorder, depressed, severe [F31.4]  Yes    Hypocalcemia [E83.51] 12/07/2023 No    Hypercalcemia [E83.52] 05/19/2023 Yes    Hypomagnesemia [E83.42] 10/12/2023 No    Hyperparathyroidism [E21.3] 11/29/2023 Yes    Severe protein-calorie malnutrition  [E43] 10/11/2015 Yes    Fall [W19.XXXA] 11/29/2023 Yes    Body mass index (BMI) less than 19 [Z68.1] 11/29/2023 Not Applicable    Acute cystitis without hematuria [N30.00] 11/29/2023 Yes    Presence of IVC filter [Z95.828] 10/11/2023 Not Applicable    Hypernatremia [E87.0] 05/19/2023 Yes    Hepatic encephalopathy [K76.82] 10/11/2015 Yes    Thrombocytopenia [D69.6] 06/15/2015 Yes    Cirrhosis, Laennec's [K70.30] 06/15/2015 Yes      Problems Resolved During this Admission:       Discharged Condition: fair    Disposition: Another Health Care Inst*    Follow Up:   Follow-up Information       Viktor Ross MD. Go on 12/6/2023.    Specialty: Family Medicine  Why: hospital follow up at 10:30 am  Contact information:  [address]  [place] [id number] [phone]                           Patient Instructions:      Diet Adult Regular     Order Specific Question Answer Comments   Protein restriction, if any: High Protein    Fluid restriction: Fluid - 1500mL      Notify your health care provider if you experience any of the following:  temperature >100.4     Notify your health care provider if you experience any of the following:  persistent nausea and vomiting or diarrhea     Notify your health care provider if you experience any of the following:  severe uncontrolled pain     Notify your health care provider if you experience any of the following:  redness, tenderness, or signs of infection (pain, swelling, redness, odor or green/yellow discharge around incision site)     Notify your health care provider if you experience any of the following:  difficulty breathing or increased cough     Notify your health care provider if you experience any of the following:  severe persistent headache     Notify your health care provider if you experience any of the following:  worsening rash     Notify your health care provider if you experience any of the following:  persistent dizziness, light-headedness, or visual  disturbances     Notify your health care provider if you experience any of the following:  increased confusion or weakness     Activity as tolerated       Significant Diagnostic Studies: Labs: BMP:   Recent Labs   Lab 12/07/23  0752 12/08/23  0439   * 83   * 135*   K 4.2 4.6    105   CO2 23 24   BUN 9 10   CREATININE 0.5 0.4*   CALCIUM 6.9* 7.0*   MG 1.4* 1.6     Microbiology: Urine Culture    Lab Results   Component Value Date    LABURIN  11/29/2023     Multiple organisms isolated. None in predominance.  Repeat if    LABURIN clinically necessary. 11/29/2023       Pending Diagnostic Studies:       None           Medications:  Reconciled Home Medications:      Medication List        START taking these medications      cholecalciferol (vitamin D3) 50 mcg (2,000 unit) Tab  Commonly known as: VITAMIN D3  Take 1 tablet (2,000 Units total) by mouth once daily.  Start taking on: December 9, 2023     thiamine 100 MG tablet  Take 1 tablet (100 mg total) by mouth once daily.            CHANGE how you take these medications      lactulose 10 gram/15 mL solution  Commonly known as: CHRONULAC  Take 45 mLs (30 g total) by mouth 3 (three) times daily.  What changed:   medication strength  additional instructions     lithium 300 MG CR tablet  Commonly known as: LITHOBID  Take 1 tablet (300 mg total) by mouth every evening.  What changed: how much to take     miconazole nitrate 2% 2 % Oint  Commonly known as: MICOTIN  Apply topically 2 (two) times daily.  What changed: additional instructions            CONTINUE taking these medications      ALPRAZolam 0.25 MG tablet  Commonly known as: XANAX  Take 1 tablet (0.25 mg total) by mouth 2 (two) times daily as needed for Anxiety.     brimonidine-timoloL 0.2-0.5 % Drop  Commonly known as: COMBIGAN  Place 1 drop into both eyes 2 (two) times a day.     ferrous sulfate 325 mg (65 mg iron) Tab tablet  Commonly known as: FEOSOL  Take 325 mg by mouth once daily.      levetiracetam 500 mg/5 mL (5 mL) Soln  Take 10 mLs (1,000 mg total) by mouth 2 (two) times daily.     OLANZapine 5 MG tablet  Commonly known as: ZyPREXA  Take 1 tablet (5 mg total) by mouth every evening.     pantoprazole 40 mg suspension  Commonly known as: PROTONIX  Take 1 packet (40 mg total) by mouth 2 (two) times daily.     rifAXIMin 550 mg Tab  Commonly known as: XIFAXAN  Take 1 tablet (550 mg total) by mouth 2 (two) times daily.            STOP taking these medications      furosemide 20 MG tablet  Commonly known as: LASIX     spironolactone 100 MG tablet  Commonly known as: ALDACTONE              Indwelling Lines/Drains at time of discharge:   Lines/Drains/Airways       Drain  Duration             Female External Urinary Catheter 11/29/23 0715 9 days                    Time spent on the discharge of patient: 45 minutes         Nathaly Roberto MD  Department of Hospital Medicine  [facility]

## 2023-12-08 NOTE — PLAN OF CARE
NURSING HOME ORDERS    12/08/2023  [facility] - MED SURG  [address] [address]  Dept: [phone]  Loc: [phone]     Admit to Nursing Home:      Diagnoses:  Active Hospital Problems    Diagnosis  POA    *Acute metabolic encephalopathy [G93.41]  Yes     Priority: 1 - High    Bipolar 1 disorder, depressed, severe [F31.4]  Yes     Priority: 2     Hypocalcemia [E83.51]  No     Priority: 3     Hypercalcemia [E83.52]  Yes     Priority: 3     Hypomagnesemia [E83.42]  No     Priority: 4     Hyperparathyroidism [E21.3]  Yes     Priority: 9     Severe protein-calorie malnutrition [E43]  Yes     Priority: 11     Fall [W19.XXXA]  Yes    Body mass index (BMI) less than 19 [Z68.1]  Not Applicable    Acute cystitis without hematuria [N30.00]  Yes    Presence of IVC filter [Z95.828]  Not Applicable    Hypernatremia [E87.0]  Yes    Hepatic encephalopathy [K76.82]  Yes    Thrombocytopenia [D69.6]  Yes    Cirrhosis, Laennec's [K70.30]  Yes      Resolved Hospital Problems   No resolved problems to display.       Patient is homebound due to:  Acute metabolic encephalopathy    Allergies:  Review of patient's allergies indicates:   Allergen Reactions    Sulfa (sulfonamide antibiotics) Rash    Codeine Nausea And Vomiting       Vitals:  Once weekly    Diet: regular diet with boost plus/VHC or ensure enlive with all meals, 1.5L free water restriction    Activities:   Activity as tolerated    Goals of Care Treatment Preferences:  Code Status: Full Code    Health care agent: Pt's sisters are NOK. Call [employer].  Health care agent number: No value filed.                   Labs:  none    Nursing Precautions:  Fall, Seizure, and Pressure ulcer prevention    Consults:   Nutrition to evaluate and recommend diet     Miscellaneous Care: Routine Skin for Bedridden Patients:  Apply moisture barrier cream to all                    Diabetes Care:  none      Medications: Discontinue all  previous medication orders, if any. See new list below.     Medication List        START taking these medications      cholecalciferol (vitamin D3) 50 mcg (2,000 unit) Tab  Commonly known as: VITAMIN D3  Take 1 tablet (2,000 Units total) by mouth once daily.  Start taking on: December 9, 2023     thiamine 100 MG tablet  Take 1 tablet (100 mg total) by mouth once daily.            CHANGE how you take these medications      lactulose 20 gram/30 mL Soln  Commonly known as: CHRONULAC  Take 45 mLs (30 g total) by mouth 3 (three) times daily.  What changed: additional instructions     lithium 300 MG CR tablet  Commonly known as: LITHOBID  Take 1 tablet (300 mg total) by mouth every evening.  What changed: how much to take     miconazole nitrate 2% 2 % Oint  Commonly known as: MICOTIN  Apply topically 2 (two) times daily.  What changed: additional instructions            CONTINUE taking these medications      ALPRAZolam 0.25 MG tablet  Commonly known as: XANAX  Take 1 tablet (0.25 mg total) by mouth 2 (two) times daily as needed for Anxiety.     brimonidine-timoloL 0.2-0.5 % Drop  Commonly known as: COMBIGAN  Place 1 drop into both eyes 2 (two) times a day.     ferrous sulfate 325 mg (65 mg iron) Tab tablet  Commonly known as: FEOSOL  Take 325 mg by mouth once daily.     levetiracetam 500 mg/5 mL (5 mL) Soln  Take 10 mLs (1,000 mg total) by mouth 2 (two) times daily.     OLANZapine 5 MG tablet  Commonly known as: ZyPREXA  Take 1 tablet (5 mg total) by mouth every evening.     pantoprazole 40 mg suspension  Commonly known as: PROTONIX  Take 1 packet (40 mg total) by mouth 2 (two) times daily.     rifAXIMin 550 mg Tab  Commonly known as: XIFAXAN  Take 1 tablet (550 mg total) by mouth 2 (two) times daily.            STOP taking these medications      furosemide 20 MG tablet  Commonly known as: LASIX     spironolactone 100 MG tablet  Commonly known as: ALDACTONE                Immunizations Administered as of 12/8/2023        Name Date Dose VIS Date Route Exp Date    COVID-19, MRNA, LN-S, PF (Moderna) 1/26/2022 0.5 mL -- Intramuscular --    Site: Right arm     Lot: [id number]     COVID-19, MRNA, LN-S, PF (Moderna) 5/6/2021 11:23 AM 0.5 mL 12/19/2020 Intramuscular 10/2/2021    Site: Right deltoid     Given By: Radha Chan PharmD     : Modern[employer]     Lot: [id number]     COVID-19, MRNA, LN-S, PF (Moderna) 4/8/2021 11:16 AM 0.5 mL 12/19/2020 Intramuscular 9/17/2021    Site: Right deltoid     Given By: Beatris Friend     : Modern[employer]     Lot: [id number]             This patient has had both covid vaccinations    Some patients may experience side effects after vaccination.  These may include fever, headache, muscle or joint aches.  Most symptoms resolve with 24-48 hours and do not require urgent medical evaluation unless they persist for more than 72 hours or symptoms are concerning for an unrelated medical condition.          _________________________________  Nathaly Roberto MD  12/08/2023

## 2023-12-09 NOTE — ASSESSMENT & PLAN NOTE
Patient was found to have thrombocytopenia, the likely etiology is secondary to cirrhosis/portal hypertension, will monitor the platelets Daily. Will transfuse if platelet count is <10k. Hold DVT prophylaxis if platelets are <50k. The patient's platelet results have been reviewed and are listed below.

## 2023-12-09 NOTE — ASSESSMENT & PLAN NOTE
Patient has hypocalcemia due to  iatrogenic after treatment for hypercalcemia   which is currently controlled, and has been confirmed with ionized and/or corrected calcium.  The latest calcium labs have been reviewed and are listed below.    - lithium restarted 12/5

## 2023-12-09 NOTE — ASSESSMENT & PLAN NOTE
Patient with known Cirrhosis. Co-morbidities are present and inclusive of hepatic encephalopathy and malnutrition.  MELD-Na score calculated; MELD 3.0: 22 at 12/1/2023 11:16 PM  MELD-Na: 20 at 12/1/2023 11:16 PM  Calculated from:  Serum Creatinine: 0.7 mg/dL (Using min of 1 mg/dL) at 12/1/2023 11:16 PM  Serum Sodium: 136 mmol/L at 12/1/2023 11:16 PM  Total Bilirubin: 3.9 mg/dL at 12/1/2023 11:16 PM  Serum Albumin: 2.1 g/dL at 12/1/2023 11:16 PM  INR(ratio): 1.9 at 11/29/2023  8:21 AM  Age at listing (hypothetical): 58 years  Sex: Female at 12/1/2023 11:16 PM      Continue chronic meds. Will avoid any hepatotoxic meds, and monitor CBC/CMP/INR for synthetic function.   Continue lactulose  Continue Rifaximin

## 2023-12-09 NOTE — ASSESSMENT & PLAN NOTE
Resolved  Patient has hypernatremia which is controlled. The hypernatremia is due to Dehydration. We will aim to correct the sodium by 8-10mEq in 24 hours. We will correct their hypernatremia with Select IV fluids: D5W/0.45 NaCl at a rate of 75 ml/hr. The patient's sodium results have been reviewed and are listed below.  Recent Labs   Lab 12/08/23  0439   *     Hold lasix and spironolactone

## 2023-12-09 NOTE — ASSESSMENT & PLAN NOTE
Nutrition consulted. Most recent weight and BMI monitored-     Measurements:  Wt Readings from Last 1 Encounters:   11/30/23 58.5 kg (129 lb)   Body mass index is 23.59 kg/m².  - dietary consulted, history  Weight Loss (Malnutrition): other (see comments) (29% x 1 month)  Energy Intake (Malnutrition): less than 75% for greater than or equal to 3 months  Subcutaneous Fat (Malnutrition): severe depletion  Muscle Mass (Malnutrition): severe depletion    Interventions/Recommendations (treatment strategy):  1. Continue Regular Diet. Continue to encourage PO intake. Consider providing assistance at all meals. 2. Continue Boost Plus/VHC OR Ensure Enlive with all meals for additional calories/PRO. 3. Recommend appetite stimulant. 4. Recommend MVI + B-complex w/ vitamin C. 5. Continue daily weights. 6. RD to monitor and follow-up.- boost TID

## 2023-12-09 NOTE — ASSESSMENT & PLAN NOTE
Pt CEC per psych recommendations for concern for SI on presentation  Lithium initially stopped on presentation due to concern for hyperparathyroid hypercalcemia secondary to lithium. Lithium restarted 12/5 as it is the only medications that works for the patient.   - continue lithium 300mg  - continue olanzapine 5mg qhs

## 2023-12-09 NOTE — ASSESSMENT & PLAN NOTE
Patient has Abnormal Magnesium: hypomagnesemia. Will continue to monitor electrolytes closely. Will replace the affected electrolytes and repeat labs to be done after interventions completed. The patient's magnesium results have been reviewed and are listed below.  Recent Labs   Lab 12/08/23  0439   MG 1.6

## 2023-12-25 PROBLEM — U07.1 PNEUMONIA DUE TO COVID-19 VIRUS: Status: RESOLVED | Noted: 2023-09-13 | Resolved: 2023-12-25

## 2023-12-25 PROBLEM — J12.82 PNEUMONIA DUE TO COVID-19 VIRUS: Status: RESOLVED | Noted: 2023-09-13 | Resolved: 2023-12-25

## 2024-01-04 ENCOUNTER — HOSPITAL ENCOUNTER (INPATIENT)
Facility: HOSPITAL | Age: 59
LOS: 12 days | Discharge: HOSPICE | DRG: 432 | End: 2024-01-17
Attending: EMERGENCY MEDICINE | Admitting: STUDENT IN AN ORGANIZED HEALTH CARE EDUCATION/TRAINING PROGRAM
Payer: MEDICARE

## 2024-01-04 DIAGNOSIS — A49.1 VRE (VANCOMYCIN-RESISTANT ENTEROCOCCI) INFECTION: ICD-10-CM

## 2024-01-04 DIAGNOSIS — R09.02 HYPOXIA: ICD-10-CM

## 2024-01-04 DIAGNOSIS — K74.60 DECOMPENSATED HEPATIC CIRRHOSIS: ICD-10-CM

## 2024-01-04 DIAGNOSIS — R06.02 SHORTNESS OF BREATH: ICD-10-CM

## 2024-01-04 DIAGNOSIS — K72.90 LIVER FAILURE: Primary | ICD-10-CM

## 2024-01-04 DIAGNOSIS — R53.1 WEAKNESS: ICD-10-CM

## 2024-01-04 DIAGNOSIS — K72.90 DECOMPENSATED HEPATIC CIRRHOSIS: ICD-10-CM

## 2024-01-04 DIAGNOSIS — K72.00 ACUTE LIVER FAILURE WITHOUT HEPATIC COMA: ICD-10-CM

## 2024-01-04 DIAGNOSIS — Z71.89 ACP (ADVANCE CARE PLANNING): ICD-10-CM

## 2024-01-04 DIAGNOSIS — F31.4 BIPOLAR 1 DISORDER, DEPRESSED, SEVERE: ICD-10-CM

## 2024-01-04 DIAGNOSIS — Z51.5 PALLIATIVE CARE ENCOUNTER: ICD-10-CM

## 2024-01-04 DIAGNOSIS — K70.31 ASCITES DUE TO ALCOHOLIC CIRRHOSIS: ICD-10-CM

## 2024-01-04 DIAGNOSIS — J96.01 ACUTE HYPOXIC RESPIRATORY FAILURE: ICD-10-CM

## 2024-01-04 DIAGNOSIS — Z16.21 VRE (VANCOMYCIN-RESISTANT ENTEROCOCCI) INFECTION: ICD-10-CM

## 2024-01-04 DIAGNOSIS — J90 PLEURAL EFFUSION: ICD-10-CM

## 2024-01-04 PROCEDURE — 99285 EMERGENCY DEPT VISIT HI MDM: CPT

## 2024-01-04 PROCEDURE — 82962 GLUCOSE BLOOD TEST: CPT

## 2024-01-04 NOTE — Clinical Note
Diagnosis: Liver failure [863037]   Future Attending Provider: MARCOS RUIZ [[id number]]   Admitting Provider:: MARCOS RUIZ [[id number]]   Special Needs:: No Special Needs [1]

## 2024-01-05 PROBLEM — J96.01 ACUTE HYPOXIC RESPIRATORY FAILURE: Status: ACTIVE | Noted: 2024-01-05

## 2024-01-05 LAB
ALBUMIN SERPL BCP-MCNC: 1.7 G/DL (ref 3.5–5.2)
ALBUMIN SERPL BCP-MCNC: 1.8 G/DL (ref 3.5–5.2)
ALLENS TEST: ABNORMAL
ALP SERPL-CCNC: 195 U/L (ref 55–135)
ALP SERPL-CCNC: 200 U/L (ref 55–135)
ALT SERPL W/O P-5'-P-CCNC: 41 U/L (ref 10–44)
ALT SERPL W/O P-5'-P-CCNC: 41 U/L (ref 10–44)
AMMONIA PLAS-SCNC: 114 UMOL/L (ref 10–50)
AMMONIA PLAS-SCNC: 120 UMOL/L (ref 10–50)
ANION GAP SERPL CALC-SCNC: 7 MMOL/L (ref 8–16)
ANION GAP SERPL CALC-SCNC: 7 MMOL/L (ref 8–16)
APTT PPP: 32.2 SEC (ref 21–32)
AST SERPL-CCNC: 102 U/L (ref 10–40)
AST SERPL-CCNC: 107 U/L (ref 10–40)
BACTERIA #/AREA URNS AUTO: ABNORMAL /HPF
BASOPHILS # BLD AUTO: 0.02 K/UL (ref 0–0.2)
BASOPHILS # BLD AUTO: 0.02 K/UL (ref 0–0.2)
BASOPHILS NFR BLD: 0.6 % (ref 0–1.9)
BASOPHILS NFR BLD: 0.6 % (ref 0–1.9)
BILIRUB DIRECT SERPL-MCNC: 7.4 MG/DL (ref 0.1–0.3)
BILIRUB SERPL-MCNC: 12.1 MG/DL (ref 0.1–1)
BILIRUB SERPL-MCNC: 12.2 MG/DL (ref 0.1–1)
BILIRUB UR QL STRIP: ABNORMAL
BNP SERPL-MCNC: 48 PG/ML (ref 0–99)
BUN SERPL-MCNC: 6 MG/DL (ref 6–20)
BUN SERPL-MCNC: 7 MG/DL (ref 6–20)
CALCIUM SERPL-MCNC: 7.8 MG/DL (ref 8.7–10.5)
CALCIUM SERPL-MCNC: 7.9 MG/DL (ref 8.7–10.5)
CHLORIDE SERPL-SCNC: 113 MMOL/L (ref 95–110)
CHLORIDE SERPL-SCNC: 113 MMOL/L (ref 95–110)
CLARITY UR REFRACT.AUTO: CLEAR
CO2 SERPL-SCNC: 22 MMOL/L (ref 23–29)
CO2 SERPL-SCNC: 22 MMOL/L (ref 23–29)
COLOR UR AUTO: ABNORMAL
CREAT SERPL-MCNC: 0.4 MG/DL (ref 0.5–1.4)
CREAT SERPL-MCNC: 0.4 MG/DL (ref 0.5–1.4)
DIFFERENTIAL METHOD BLD: ABNORMAL
DIFFERENTIAL METHOD BLD: ABNORMAL
EOSINOPHIL # BLD AUTO: 0.1 K/UL (ref 0–0.5)
EOSINOPHIL # BLD AUTO: 0.2 K/UL (ref 0–0.5)
EOSINOPHIL NFR BLD: 3.8 % (ref 0–8)
EOSINOPHIL NFR BLD: 4.4 % (ref 0–8)
ERYTHROCYTE [DISTWIDTH] IN BLOOD BY AUTOMATED COUNT: 15.9 % (ref 11.5–14.5)
ERYTHROCYTE [DISTWIDTH] IN BLOOD BY AUTOMATED COUNT: 15.9 % (ref 11.5–14.5)
EST. GFR  (NO RACE VARIABLE): >60 ML/MIN/1.73 M^2
EST. GFR  (NO RACE VARIABLE): >60 ML/MIN/1.73 M^2
GLUCOSE SERPL-MCNC: 77 MG/DL (ref 70–110)
GLUCOSE SERPL-MCNC: 89 MG/DL (ref 70–110)
GLUCOSE UR QL STRIP: NEGATIVE
GRAM STN SPEC: NORMAL
GRAM STN SPEC: NORMAL
HCO3 UR-SCNC: 24.2 MMOL/L (ref 24–28)
HCT VFR BLD AUTO: 25.6 % (ref 37–48.5)
HCT VFR BLD AUTO: 27.7 % (ref 37–48.5)
HCT VFR BLD CALC: 26 %PCV (ref 36–54)
HGB BLD-MCNC: 8.7 G/DL (ref 12–16)
HGB BLD-MCNC: 9.2 G/DL (ref 12–16)
HGB UR QL STRIP: NEGATIVE
IMM GRANULOCYTES # BLD AUTO: 0.02 K/UL (ref 0–0.04)
IMM GRANULOCYTES # BLD AUTO: 0.03 K/UL (ref 0–0.04)
IMM GRANULOCYTES NFR BLD AUTO: 0.6 % (ref 0–0.5)
IMM GRANULOCYTES NFR BLD AUTO: 0.9 % (ref 0–0.5)
INR PPP: 1.8 (ref 0.8–1.2)
INR PPP: 1.8 (ref 0.8–1.2)
KETONES UR QL STRIP: NEGATIVE
LACTATE SERPL-SCNC: 1 MMOL/L (ref 0.5–2.2)
LEUKOCYTE ESTERASE UR QL STRIP: ABNORMAL
LITHIUM SERPL-SCNC: 0.6 MMOL/L (ref 0.6–1.2)
LYMPHOCYTES # BLD AUTO: 0.5 K/UL (ref 1–4.8)
LYMPHOCYTES # BLD AUTO: 0.6 K/UL (ref 1–4.8)
LYMPHOCYTES NFR BLD: 14.8 % (ref 18–48)
LYMPHOCYTES NFR BLD: 16.9 % (ref 18–48)
MCH RBC QN AUTO: 37.9 PG (ref 27–31)
MCH RBC QN AUTO: 38.5 PG (ref 27–31)
MCHC RBC AUTO-ENTMCNC: 33.2 G/DL (ref 32–36)
MCHC RBC AUTO-ENTMCNC: 34 G/DL (ref 32–36)
MCV RBC AUTO: 113 FL (ref 82–98)
MCV RBC AUTO: 114 FL (ref 82–98)
MICROSCOPIC COMMENT: ABNORMAL
MONOCYTES # BLD AUTO: 0.4 K/UL (ref 0.3–1)
MONOCYTES # BLD AUTO: 0.4 K/UL (ref 0.3–1)
MONOCYTES NFR BLD: 10.7 % (ref 4–15)
MONOCYTES NFR BLD: 11 % (ref 4–15)
NEUTROPHILS # BLD AUTO: 2.2 K/UL (ref 1.8–7.7)
NEUTROPHILS # BLD AUTO: 2.3 K/UL (ref 1.8–7.7)
NEUTROPHILS NFR BLD: 66.5 % (ref 38–73)
NEUTROPHILS NFR BLD: 69.2 % (ref 38–73)
NITRITE UR QL STRIP: POSITIVE
NRBC BLD-RTO: 0 /100 WBC
NRBC BLD-RTO: 0 /100 WBC
PCO2 BLDA: 32.8 MMHG (ref 35–45)
PH SMN: 7.48 [PH] (ref 7.35–7.45)
PH UR STRIP: 6 [PH] (ref 5–8)
PLATELET # BLD AUTO: 42 K/UL (ref 150–450)
PLATELET # BLD AUTO: 47 K/UL (ref 150–450)
PMV BLD AUTO: 9.3 FL (ref 9.2–12.9)
PMV BLD AUTO: 9.8 FL (ref 9.2–12.9)
PO2 BLDA: 45 MMHG (ref 40–60)
POC BE: 1 MMOL/L
POC SATURATED O2: 85 % (ref 95–100)
POC TCO2: 25 MMOL/L (ref 24–29)
POCT GLUCOSE: 144 MG/DL (ref 70–110)
POTASSIUM SERPL-SCNC: 3.5 MMOL/L (ref 3.5–5.1)
POTASSIUM SERPL-SCNC: 3.5 MMOL/L (ref 3.5–5.1)
PROT SERPL-MCNC: 4.7 G/DL (ref 6–8.4)
PROT SERPL-MCNC: 4.8 G/DL (ref 6–8.4)
PROT UR QL STRIP: NEGATIVE
PROTHROMBIN TIME: 18.5 SEC (ref 9–12.5)
PROTHROMBIN TIME: 18.9 SEC (ref 9–12.5)
RBC # BLD AUTO: 2.26 M/UL (ref 4–5.4)
RBC # BLD AUTO: 2.43 M/UL (ref 4–5.4)
RBC #/AREA URNS AUTO: 1 /HPF (ref 0–4)
SAMPLE: ABNORMAL
SARS-COV-2 RDRP RESP QL NAA+PROBE: NEGATIVE
SITE: ABNORMAL
SODIUM SERPL-SCNC: 142 MMOL/L (ref 136–145)
SODIUM SERPL-SCNC: 142 MMOL/L (ref 136–145)
SP GR UR STRIP: 1.01 (ref 1–1.03)
SQUAMOUS #/AREA URNS AUTO: 1 /HPF
TROPONIN I SERPL DL<=0.01 NG/ML-MCNC: 0.01 NG/ML (ref 0–0.03)
URN SPEC COLLECT METH UR: ABNORMAL
WBC # BLD AUTO: 3.18 K/UL (ref 3.9–12.7)
WBC # BLD AUTO: 3.38 K/UL (ref 3.9–12.7)
WBC #/AREA URNS AUTO: 2 /HPF (ref 0–5)

## 2024-01-05 PROCEDURE — 0WJFXZZ INSPECTION OF ABDOMINAL WALL, EXTERNAL APPROACH: ICD-10-PCS | Performed by: RADIOLOGY

## 2024-01-05 PROCEDURE — 82140 ASSAY OF AMMONIA: CPT | Performed by: EMERGENCY MEDICINE

## 2024-01-05 PROCEDURE — 93005 ELECTROCARDIOGRAM TRACING: CPT

## 2024-01-05 PROCEDURE — 80053 COMPREHEN METABOLIC PANEL: CPT | Mod: 91 | Performed by: EMERGENCY MEDICINE

## 2024-01-05 PROCEDURE — 85730 THROMBOPLASTIN TIME PARTIAL: CPT | Performed by: STUDENT IN AN ORGANIZED HEALTH CARE EDUCATION/TRAINING PROGRAM

## 2024-01-05 PROCEDURE — 82248 BILIRUBIN DIRECT: CPT | Performed by: STUDENT IN AN ORGANIZED HEALTH CARE EDUCATION/TRAINING PROGRAM

## 2024-01-05 PROCEDURE — 80053 COMPREHEN METABOLIC PANEL: CPT | Performed by: STUDENT IN AN ORGANIZED HEALTH CARE EDUCATION/TRAINING PROGRAM

## 2024-01-05 PROCEDURE — 87116 MYCOBACTERIA CULTURE: CPT | Performed by: STUDENT IN AN ORGANIZED HEALTH CARE EDUCATION/TRAINING PROGRAM

## 2024-01-05 PROCEDURE — 82803 BLOOD GASES ANY COMBINATION: CPT

## 2024-01-05 PROCEDURE — 20600001 HC STEP DOWN PRIVATE ROOM

## 2024-01-05 PROCEDURE — 82140 ASSAY OF AMMONIA: CPT | Mod: 91 | Performed by: STUDENT IN AN ORGANIZED HEALTH CARE EDUCATION/TRAINING PROGRAM

## 2024-01-05 PROCEDURE — 83615 LACTATE (LD) (LDH) ENZYME: CPT | Performed by: STUDENT IN AN ORGANIZED HEALTH CARE EDUCATION/TRAINING PROGRAM

## 2024-01-05 PROCEDURE — 89051 BODY FLUID CELL COUNT: CPT | Performed by: STUDENT IN AN ORGANIZED HEALTH CARE EDUCATION/TRAINING PROGRAM

## 2024-01-05 PROCEDURE — 84484 ASSAY OF TROPONIN QUANT: CPT | Performed by: EMERGENCY MEDICINE

## 2024-01-05 PROCEDURE — 87205 SMEAR GRAM STAIN: CPT | Performed by: STUDENT IN AN ORGANIZED HEALTH CARE EDUCATION/TRAINING PROGRAM

## 2024-01-05 PROCEDURE — 87075 CULTR BACTERIA EXCEPT BLOOD: CPT | Performed by: STUDENT IN AN ORGANIZED HEALTH CARE EDUCATION/TRAINING PROGRAM

## 2024-01-05 PROCEDURE — 25000003 PHARM REV CODE 250: Performed by: STUDENT IN AN ORGANIZED HEALTH CARE EDUCATION/TRAINING PROGRAM

## 2024-01-05 PROCEDURE — 83880 ASSAY OF NATRIURETIC PEPTIDE: CPT | Performed by: EMERGENCY MEDICINE

## 2024-01-05 PROCEDURE — 82042 OTHER SOURCE ALBUMIN QUAN EA: CPT | Performed by: STUDENT IN AN ORGANIZED HEALTH CARE EDUCATION/TRAINING PROGRAM

## 2024-01-05 PROCEDURE — 0W993ZZ DRAINAGE OF RIGHT PLEURAL CAVITY, PERCUTANEOUS APPROACH: ICD-10-PCS | Performed by: RADIOLOGY

## 2024-01-05 PROCEDURE — 80178 ASSAY OF LITHIUM: CPT | Performed by: STUDENT IN AN ORGANIZED HEALTH CARE EDUCATION/TRAINING PROGRAM

## 2024-01-05 PROCEDURE — 85025 COMPLETE CBC W/AUTO DIFF WBC: CPT | Mod: 91 | Performed by: EMERGENCY MEDICINE

## 2024-01-05 PROCEDURE — 85025 COMPLETE CBC W/AUTO DIFF WBC: CPT | Performed by: STUDENT IN AN ORGANIZED HEALTH CARE EDUCATION/TRAINING PROGRAM

## 2024-01-05 PROCEDURE — U0002 COVID-19 LAB TEST NON-CDC: HCPCS | Performed by: STUDENT IN AN ORGANIZED HEALTH CARE EDUCATION/TRAINING PROGRAM

## 2024-01-05 PROCEDURE — 87206 SMEAR FLUORESCENT/ACID STAI: CPT | Performed by: STUDENT IN AN ORGANIZED HEALTH CARE EDUCATION/TRAINING PROGRAM

## 2024-01-05 PROCEDURE — 81001 URINALYSIS AUTO W/SCOPE: CPT | Performed by: STUDENT IN AN ORGANIZED HEALTH CARE EDUCATION/TRAINING PROGRAM

## 2024-01-05 PROCEDURE — 87070 CULTURE OTHR SPECIMN AEROBIC: CPT | Performed by: STUDENT IN AN ORGANIZED HEALTH CARE EDUCATION/TRAINING PROGRAM

## 2024-01-05 PROCEDURE — 63600175 PHARM REV CODE 636 W HCPCS: Performed by: STUDENT IN AN ORGANIZED HEALTH CARE EDUCATION/TRAINING PROGRAM

## 2024-01-05 PROCEDURE — 85610 PROTHROMBIN TIME: CPT | Performed by: STUDENT IN AN ORGANIZED HEALTH CARE EDUCATION/TRAINING PROGRAM

## 2024-01-05 PROCEDURE — 85610 PROTHROMBIN TIME: CPT | Mod: 91 | Performed by: EMERGENCY MEDICINE

## 2024-01-05 PROCEDURE — 83605 ASSAY OF LACTIC ACID: CPT | Performed by: EMERGENCY MEDICINE

## 2024-01-05 PROCEDURE — 84157 ASSAY OF PROTEIN OTHER: CPT | Performed by: STUDENT IN AN ORGANIZED HEALTH CARE EDUCATION/TRAINING PROGRAM

## 2024-01-05 PROCEDURE — 99900035 HC TECH TIME PER 15 MIN (STAT)

## 2024-01-05 PROCEDURE — 80321 ALCOHOLS BIOMARKERS 1OR 2: CPT | Performed by: STUDENT IN AN ORGANIZED HEALTH CARE EDUCATION/TRAINING PROGRAM

## 2024-01-05 PROCEDURE — 82800 BLOOD PH: CPT

## 2024-01-05 RX ORDER — INSULIN ASPART 100 [IU]/ML
0-5 INJECTION, SOLUTION INTRAVENOUS; SUBCUTANEOUS
Status: DISCONTINUED | OUTPATIENT
Start: 2024-01-05 | End: 2024-01-18 | Stop reason: HOSPADM

## 2024-01-05 RX ORDER — IBUPROFEN 200 MG
16 TABLET ORAL
Status: DISCONTINUED | OUTPATIENT
Start: 2024-01-05 | End: 2024-01-18 | Stop reason: HOSPADM

## 2024-01-05 RX ORDER — THIAMINE HCL 100 MG
100 TABLET ORAL DAILY
Status: DISCONTINUED | OUTPATIENT
Start: 2024-01-05 | End: 2024-01-11

## 2024-01-05 RX ORDER — FUROSEMIDE 20 MG/1
20 TABLET ORAL DAILY
Status: ON HOLD | COMMUNITY
Start: 2023-12-11 | End: 2024-01-17 | Stop reason: HOSPADM

## 2024-01-05 RX ORDER — PANTOPRAZOLE SODIUM 40 MG/1
40 FOR SUSPENSION ORAL 2 TIMES DAILY
Status: DISCONTINUED | OUTPATIENT
Start: 2024-01-05 | End: 2024-01-11

## 2024-01-05 RX ORDER — OLANZAPINE 2.5 MG/1
5 TABLET ORAL NIGHTLY
Status: DISCONTINUED | OUTPATIENT
Start: 2024-01-05 | End: 2024-01-08

## 2024-01-05 RX ORDER — SPIRONOLACTONE 100 MG/1
100 TABLET, FILM COATED ORAL DAILY
COMMUNITY

## 2024-01-05 RX ORDER — LIDOCAINE HYDROCHLORIDE 10 MG/ML
INJECTION INFILTRATION; PERINEURAL
Status: COMPLETED | OUTPATIENT
Start: 2024-01-05 | End: 2024-01-05

## 2024-01-05 RX ORDER — SODIUM CHLORIDE 0.9 % (FLUSH) 0.9 %
10 SYRINGE (ML) INJECTION
Status: DISCONTINUED | OUTPATIENT
Start: 2024-01-05 | End: 2024-01-18 | Stop reason: HOSPADM

## 2024-01-05 RX ORDER — GLUCAGON 1 MG
1 KIT INJECTION
Status: DISCONTINUED | OUTPATIENT
Start: 2024-01-05 | End: 2024-01-18 | Stop reason: HOSPADM

## 2024-01-05 RX ORDER — FLUOXETINE HYDROCHLORIDE 20 MG/1
20 CAPSULE ORAL DAILY
COMMUNITY
Start: 2023-12-19 | End: 2024-01-05

## 2024-01-05 RX ORDER — ALPRAZOLAM 0.25 MG/1
0.25 TABLET ORAL 2 TIMES DAILY PRN
Status: DISCONTINUED | OUTPATIENT
Start: 2024-01-05 | End: 2024-01-18 | Stop reason: HOSPADM

## 2024-01-05 RX ORDER — CHOLECALCIFEROL (VITAMIN D3) 25 MCG
2000 TABLET ORAL DAILY
Status: DISCONTINUED | OUTPATIENT
Start: 2024-01-05 | End: 2024-01-11

## 2024-01-05 RX ORDER — IBUPROFEN 200 MG
24 TABLET ORAL
Status: DISCONTINUED | OUTPATIENT
Start: 2024-01-05 | End: 2024-01-18 | Stop reason: HOSPADM

## 2024-01-05 RX ORDER — LEVETIRACETAM 100 MG/ML
1000 SOLUTION ORAL 2 TIMES DAILY
Status: DISCONTINUED | OUTPATIENT
Start: 2024-01-05 | End: 2024-01-11

## 2024-01-05 RX ORDER — LACTULOSE 10 G/15ML
30 SOLUTION ORAL 3 TIMES DAILY
Status: DISCONTINUED | OUTPATIENT
Start: 2024-01-05 | End: 2024-01-09

## 2024-01-05 RX ORDER — LITHIUM CARBONATE 300 MG/1
300 TABLET, FILM COATED, EXTENDED RELEASE ORAL NIGHTLY
Status: DISCONTINUED | OUTPATIENT
Start: 2024-01-05 | End: 2024-01-18 | Stop reason: HOSPADM

## 2024-01-05 RX ADMIN — LEVETIRACETAM 1000 MG: 100 SOLUTION ORAL at 08:01

## 2024-01-05 RX ADMIN — Medication 100 MG: at 11:01

## 2024-01-05 RX ADMIN — OLANZAPINE 5 MG: 5 TABLET, FILM COATED ORAL at 04:01

## 2024-01-05 RX ADMIN — LACTULOSE 30 G: 20 SOLUTION ORAL at 08:01

## 2024-01-05 RX ADMIN — PANTOPRAZOLE SODIUM 40 MG: 40 GRANULE, DELAYED RELEASE ORAL at 11:01

## 2024-01-05 RX ADMIN — OLANZAPINE 5 MG: 5 TABLET, FILM COATED ORAL at 09:01

## 2024-01-05 RX ADMIN — LIDOCAINE HYDROCHLORIDE 10 ML: 10 INJECTION, SOLUTION INFILTRATION; PERINEURAL at 10:01

## 2024-01-05 RX ADMIN — LITHIUM CARBONATE 300 MG: 300 TABLET, FILM COATED, EXTENDED RELEASE ORAL at 09:01

## 2024-01-05 RX ADMIN — CEFTRIAXONE 1 G: 1 INJECTION, POWDER, FOR SOLUTION INTRAMUSCULAR; INTRAVENOUS at 04:01

## 2024-01-05 RX ADMIN — CHOLECALCIFEROL TAB 25 MCG (1000 UNIT) 2000 UNITS: 25 TAB at 11:01

## 2024-01-05 RX ADMIN — LACTULOSE 30 G: 20 SOLUTION ORAL at 03:01

## 2024-01-05 RX ADMIN — LITHIUM CARBONATE 300 MG: 300 TABLET, FILM COATED, EXTENDED RELEASE ORAL at 06:01

## 2024-01-05 RX ADMIN — LEVETIRACETAM 1000 MG: 100 SOLUTION ORAL at 11:01

## 2024-01-05 RX ADMIN — RIFAXIMIN 550 MG: 550 TABLET ORAL at 09:01

## 2024-01-05 RX ADMIN — RIFAXIMIN 550 MG: 550 TABLET ORAL at 11:01

## 2024-01-05 RX ADMIN — LACTULOSE 30 G: 20 SOLUTION ORAL at 11:01

## 2024-01-05 NOTE — ED TRIAGE NOTES
Marely Hamilton, a 58 y.o. female presents to the ED w/ complaint of     Triage note:  Chief Complaint   Patient presents with    Ascites     From [facility] for abd distention, BLE swelling, and jaudice. Hx of cirrhosis.      Review of patient's allergies indicates:   Allergen Reactions    Sulfa (sulfonamide antibiotics) Rash    Codeine Nausea And Vomiting     Past Medical History:   Diagnosis Date    Addiction to drug     Alcohol abuse     Alcohol abuse, in remission 6/15/2015    14.5 weeks ago; AA weekly    Anemia     Anxiety 6/15/2015    Behavioral problem     Bipolar disorder     Bipolar disorder in remission 6/15/2015    Cirrhosis, Laenn's 6/15/2015    Depression     Encounter for blood transfusion     Epistaxis 6/15/2015    Fatigue     Glaucoma     Hematuria     Hepatic encephalopathy 6/15/2015    Hepatic enlargement     History of psychiatric care     History of psychiatric hospitalization     History of seizure 6/15/2015    1    hx of intentional Tylenol overdose 6/15/2015    2005- situational and hx of bipolar    Hx of psychiatric care     Macrocytic anemia 9/18/2015    6 units PRBC since June 2015    Jeana     Osteoarthritis     Other ascites 6/15/2015    Psychiatric exam requested by authority     Psychiatric problem     Psychosis 9/26/2019    Renal disorder     Seizures     Self-harming behavior     Suicide attempt     Therapy     Thrombocytopenia 6/15/2015

## 2024-01-05 NOTE — ASSESSMENT & PLAN NOTE
Patient with Hypoxic Respiratory failure which is Acute on chronic.  she is not on home oxygen. Supplemental oxygen was provided and noted improvement in her O2 sats.  -CXR with likely compressive atelectasis due to ascites.

## 2024-01-05 NOTE — CONSULTS
[facility]  Hepatology  Consult Note    Patient Name: Marely Hamilton  MRN: [id number]  Admission Date: 1/4/2024  Hospital Length of Stay: 0 days  Code Status: Full Code   Attending Provider:  Dr. Alvarado  Consulting Provider: Steve Lloyd MD  Primary Care Physician: Viktor Ross MD  Principal Problem:Acute liver failure without hepatic coma    Consults  Subjective:     HPI: Marely Hamilton is a 58 y.o. female with history of EtOH cirrhosis, bipolar disorder, DVT with IVC filter in place, prior GI bleeds, ESBL UTI history, and seizure disorder. She presented to the ER from her nursing home for abdominal distension, peripheral edema, and jaundice. She was hypoxic on room air to the 80s, which improved with supplemental oxygen. Chest x-ray revealed a moderate-to-large pleural effusion with compressive atelectasis. Intake labs remarkable for Na 142, Cr 0.4, Tbili 12.1, and INR 1.8. Thoracentesis was performed and 650 cc pleural fluid removed. No safe window could be found for paracentesis.     Hepatology consulted for management of decompensated cirrhosis.       Pertinent past hx:  Was first known to us in May 2023 as a transfer from [facility] for management of hepatic encephalopathy following initial admission on 05/18 for acute onset of encephalopathy associated with UTI secondary to Proteus, hypernatremia, and hypercalcemia. Had a retroperitoneal bleed in June 2023, s./p IR embolization of left lumbar and internal iliac branches. She was seen by our GI team for melena in July 2023. EGD on 7/6/23 showed esophageal ulcer, no varices, non-bleeding gastric ulcer and duodenal ulcer with oozing (clip placed). PETH in July was negative. In early December 2023, she was admitted after a fall at her nursing home. Was found to have hypercalcemia & UTI which were treated.  On her previous admission, psychiatry was consulted for bipolar disorder and agitation. They recommended continuing lithium &  Zyprexa. 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 Has been declined for a liver transplant twice - for frailty, non-compliance and comorbidities.       Review of Systems   Constitutional:  Positive for malaise/fatigue and weight loss.   Eyes:         Yellowing of eyes   Respiratory:  Negative for cough and hemoptysis.    Cardiovascular:  Positive for leg swelling. Negative for chest pain.   Gastrointestinal:  Negative for blood in stool.   Skin:         Yellowing of skin   Neurological:  Negative for focal weakness and seizures.   Psychiatric/Behavioral:  Negative for substance abuse. The patient is nervous/anxious.         Objective:     Vitals:    01/05/24 0515   BP: (!) 107/59   Pulse: 90   Resp: 12   Temp:          Constitutional: thin,  alert, ill appearing, but non-toxic, pale, and well developed. Disheveled appearance.   HENT: Head: Normal, normocephalic, atraumatic.  Eyes: conjunctiva clear and sclera icteric  GI: soft, non-tender, without masses or organomegaly, nondistended, normal bowel sounds, without guarding, and without rebound  Musculoskeletal: no muscular tenderness noted. Diffuse sarcopenia noted.   Skin: jaundice present  Neurological: alert, oriented x3  speech normal in context and clarity  memory intact grossly  Psychiatric: Flat affect      Significant Labs:  Recent Labs   Lab 01/05/24  0050 01/05/24  0359   HGB 8.7* 9.2*       Lab Results   Component Value Date    WBC 3.18 (L) 01/05/2024    HGB 9.2 (L) 01/05/2024    HCT 27.7 (L) 01/05/2024     (H) 01/05/2024    PLT 47 (L) 01/05/2024       Lab Results   Component Value Date     01/05/2024    K 3.5 01/05/2024     (H) 01/05/2024    CO2 22 (L) 01/05/2024    BUN 7 01/05/2024    CREATININE 0.4 (L) 01/05/2024    CALCIUM 7.9 (L) 01/05/2024    ANIONGAP 7 (L) 01/05/2024    ESTGFRAFRICA SEE COMMENT 06/03/2023    EGFRNONAA SEE COMMENT 06/03/2023       Lab Results   Component Value Date    ALT  41 01/05/2024     (H) 01/05/2024    GGT 33 06/17/2023    ALKPHOS 195 (H) 01/05/2024    BILITOT 12.2 (H) 01/05/2024       Lab Results   Component Value Date    INR 1.8 (H) 01/05/2024    INR 1.8 (H) 01/05/2024    INR 1.9 (H) 11/29/2023       Significant Imaging:  Reviewed pertinent radiology findings.       Assessment/Plan:     Marely Hamilton is a 58 y.o. female with history of EtOH cirrhosis, bipolar disorder, DVT with IVC filter in place, prior GI bleeds, ESBL UTI history, and seizure disorder. She presented to the ER from her nursing home for abdominal distension, peripheral edema, and jaundice. Found to have pleural effusion; thora performed. Has had multiple admissions in the past year for HE, UTI, falls, and GI bleeding (EGD on 7/6/23 showed esophageal ulcer, no varices, non-bleeding gastric ulcer and duodenal ulcer with oozing). Extremely frail and ill appearing on exam. Has been declined for a liver transplant twice - for frailty, non-compliance and comorbidities.     Problem List:  Decompensated cirrhosis 2/2 EtOH use (c/b HE and ascites)  Hx of bleeding gastric and duodenal ulcers  EtOH use disorder  Bipolar disorder      MELD 3.0: 27 at 1/5/2024  3:59 AM  MELD-Na: 22 at 1/5/2024  3:59 AM  Calculated from:  Serum Creatinine: 0.4 mg/dL (Using min of 1 mg/dL) at 1/5/2024  3:59 AM  Serum Sodium: 142 mmol/L (Using max of 137 mmol/L) at 1/5/2024  3:59 AM  Total Bilirubin: 12.2 mg/dL at 1/5/2024  3:59 AM  Serum Albumin: 1.8 g/dL at 1/5/2024  3:59 AM  INR(ratio): 1.8 at 1/5/2024  3:59 AM  Age at listing (hypothetical): 58 years  Sex: Female at 1/5/2024  3:59 AM        Recommendations:  - No plans for inpatient liver tx eval given frailty, social situation, and medical/psychiatric comorbidities.   - Full septic workup (blood cx, urine cx, pleural fluid studies).  - Thoracentesis with pleural fluid studies to r/o infection.Continue empiric abx in interim.    - Continue lactulose 15 gm TID & titrate till 3-4  soft BM achieved daily. Continue Xifaxin 550 mg BID.   - Continue home psychiatric medications per psych recs.    - Avoid sedating drugs like opiates and BZDs, if possible.   - Low Na, high protein diet.   - For frailty: Continue PT, OT, and Nutrition.    - Daily CBC, CMP, INR. Follow PET.     Thank you for involving us in the care of Marely Hamilton. Please call with any additional questions, concerns or changes in the patient's clinical status. We will continue to follow.     Steve Lloyd MD  Gastroenterology Fellow PGY V  [facility]

## 2024-01-05 NOTE — PROGRESS NOTES
During the consenting process patient reports that her sister makes medical decisions for her and declined signed the consent form.     After the primary team notified the patient's family of her hospitalization, consent was obtain from sister Zaria.     Zaria did confirm that she does make medical decisions for patient.     Italia Caban PA-C  Interventional Radiology  [phone]

## 2024-01-05 NOTE — ED NOTES
Patient identifiers for Marely Hamilton 58 y.o. female checked and correct.  Chief Complaint   Patient presents with    Ascites     From [facility] for abd distention, BLE swelling, and jaudice. Hx of cirrhosis.      Past Medical History:   Diagnosis Date    Addiction to drug     Alcohol abuse     Alcohol abuse, in remission 6/15/2015    14.5 weeks ago; AA weekly    Anemia     Anxiety 6/15/2015    Behavioral problem     Bipolar disorder     Bipolar disorder in remission 6/15/2015    Cirrhosis, Laennec's 6/15/2015    Depression     Encounter for blood transfusion     Epistaxis 6/15/2015    Fatigue     Glaucoma     Hematuria     Hepatic encephalopathy 6/15/2015    Hepatic enlargement     History of psychiatric care     History of psychiatric hospitalization     History of seizure 6/15/2015    1    hx of intentional Tylenol overdose 6/15/2015    2005- situational and hx of bipolar    Hx of psychiatric care     Macrocytic anemia 9/18/2015    6 units PRBC since June 2015    Jeana     Osteoarthritis     Other ascites 6/15/2015    Psychiatric exam requested by authority     Psychiatric problem     Psychosis 9/26/2019    Renal disorder     Seizures     Self-harming behavior     Suicide attempt     Therapy     Thrombocytopenia 6/15/2015     Allergies reported:   Review of patient's allergies indicates:   Allergen Reactions    Sulfa (sulfonamide antibiotics) Rash    Codeine Nausea And Vomiting         HEENT: Denies vision changes. Denies ear drainage or hearing loss. No c/o nasal drainage. Denies dysphagia or voice changes.   Appearance: Pt awake, alert & oriented to person, place & time. Pt in no acute distress at present time. Pt is clean and well groomed with clothes appropriately fastened.   Skin: Skin warm, dry & intact. Color consistent with ethnicity. Mucous membranes moist. No breakdown or brusing noted.   Musculoskeletal: Patient moving all extremities well, no obvious swelling or deformities noted.    Respiratory: Respirations spontaneous, even, and non-labored. Visible chest rise noted. Airway is open and patent. No accessory muscle use noted.   Neurologic: Sensation is intact. Speech is clear and appropriate. Eyes open spontaneously, behavior appropriate to situation, follows commands, facial expression symmetrical, bilateral hand grasp equal and even, purposeful motor response noted.  Cardiac: All peripheral pulses present. No Bilateral lower extremity edema. Cap refill is <3 seconds.  Abdomen: Distended abdomen, rigid upon palpation.    : Pt voids independently, denies dysuria, hematuria, frequency.

## 2024-01-05 NOTE — CONSULTS
"Hospital Medicine Pharmacy Consult Note    PharmD Consult Received For:     Pharmacy to perform an admission medication history and reconciliation  You may go to "Admission" then "Reconcile Home Medications" tabs to review and/or act upon these items.   The home medication list has been updated by the Pharmacy department.   Please address this information as you see fit.      Medications listed below were obtained from: [facility]  Current Outpatient Medications on File Prior to Encounter   Medication Sig    ALPRAZolam (XANAX) 0.25 MG tablet Take 1 tablet (0.25 mg total) by mouth 2 (two) times daily as needed for Anxiety.    brimonidine-timoloL (COMBIGAN) 0.2-0.5 % Drop Place 1 drop into both eyes 2 (two) times a day.    ferrous sulfate (FEOSOL) 325 mg (65 mg iron) Tab tablet Take 325 mg by mouth once daily.    furosemide (LASIX) 20 MG tablet Take 20 mg by mouth once daily.    lactulose (CHRONULAC) 10 gram/15 mL solution Take 45 mLs (30 g total) by mouth 3 (three) times daily.    levetiracetam 500 mg/5 mL (5 mL) Soln Take 10 mLs (1,000 mg total) by mouth 2 (two) times daily.    lithium (LITHOBID) 300 MG CR tablet Take 1 tablet (300 mg total) by mouth every evening. (Patient taking differently: Take 600 mg by mouth every evening.)    miconazole nitrate 2% (MICOTIN) 2 % Oint Apply topically 2 (two) times daily.    OLANZapine (ZYPREXA) 5 MG tablet Take 1 tablet (5 mg total) by mouth every evening.    pantoprazole (PROTONIX) 40 mg suspension Take 1 packet (40 mg total) by mouth 2 (two) times daily.    rifAXIMin (XIFAXAN) 550 mg Tab Take 1 tablet (550 mg total) by mouth 2 (two) times daily.    spironolactone (ALDACTONE) 100 MG tablet Take 100 mg by mouth once daily.     Potential issues to be addressed PRIOR TO DISCHARGE  Lithium was reduced from 600 mg to 300 mg qHS at last hospitalization in December 2023, however, the 600 mg dose remains on the [facility] MAR provided 1/5/24.     Renal Dose Adjustments  Medications " reviewed, no dose adjustments needed    Pharmacy will sign-off, please re-consult as needed    Thank you for the consult,  Evangelina Luis  [place] [phone]    **Note: Consults are reviewed Monday-Friday 7:00am-3:30pm. The above recommendations are only suggested. The recommendations should be considered in conjunction with all patient factors.**

## 2024-01-05 NOTE — PLAN OF CARE
Patient presents for US-guided paracentesis. Patient is awake and alert. See VS Flowsheet. Awaiting evaluation by provider and procedure consent.      Patient states that she does not make her own medical decisions and defers to her sister Zaria.

## 2024-01-05 NOTE — PROGRESS NOTES
Initial US imaging of the abdomen was performed. Small volume ascites was noted in the right lower quadrant, but a safe percutaneous window could not be identified despite patient repositioning. Paracentesis not performed.     Italia Caban PA-C  Interventional Radiology  [phone]

## 2024-01-05 NOTE — PROCEDURES
Radiology Post-Procedure Note    Pre Op Diagnosis: Right Pleural Effusion   Post Op Diagnosis: Same    Procedure: Ultrasound Guided Right thoracentesis.     Procedure performed by: Antolin Moses MD and Italia Caban PA-C    Written Informed Consent Obtained: Yes (consent obtained from sister Zaria)  Specimen Removed: YES (650 mL yellow, clear)  Estimated Blood Loss: Minimal    Findings:   Successful Right thoracentesis.   Patient tolerated procedure well.    Italia Caban PA-C  Interventional Radiology  [phone]

## 2024-01-05 NOTE — ED NOTES
Daughter Zaria updated via phone regarding pt status and plan of care, verbalizes understanding. Would like to be notified when fluid labs result or if there is a change in status or findings.

## 2024-01-05 NOTE — HPI
Patient is a 58-year-old woman with past medical history significant for alcoholic cirrhosis, bipolar disorder, chronic anemia, thrombocytopenia, cholelithiasis, DVT with IVC filter in place, prior GI bleeds, ESBL UTI history, seizure disorder presenting from [facility] for evaluation of abdominal distension, peripheral edema, and jaundice.     Patient denies fever, chills, or abdominal pain. Patient was recently admitted in early December for hepatic encephalopathy.   Patient denies any pain at this time. She reports she has been urinating frequently though difficult to obtain history.   She denies recent illness/fever/chills/nausea/vomiting/diarrhea/constipation.  Patient has not been taking lactulose. Reports compliance with psychiatric medications and A&Ox3 but reports she can't take the lactulose due to not tolerating the sun.    Hospital Course:   In the ED, vitals stable but hypoxic on room air to the 80s, which improved with supplemental oxygen.  Presentation consistent with decompensated liver failure, meld score 22.    She was sent in by her nursing home facility for mild abdominal distention as well as significant jaundice.    Chest x-ray revealed a moderate-to-large pleural effusion with compressive atelectasis. Likely from her ascites. Her abdomen is soft, nontender.      Intake labs remarkable for Na 142, BUN 6, Cr 0.4, Calcium 7.8, , Tbili 12.1, , ALT 41, Ammonia 114, lactic 1.0.     Will admit for decompensated cirrhosis. Continue on lactulose for treatment of HE, rocephin for SBP ppx. Hepatology and IR consulted for thoracentesis and further recommendations.   Patient is oriented to person, place, time, and situation but with some delusions.

## 2024-01-05 NOTE — SUBJECTIVE & OBJECTIVE
Past Medical History:   Diagnosis Date    Addiction to drug     Alcohol abuse     Alcohol abuse, in remission 6/15/2015    14.5 weeks ago; AA weekly    Anemia     Anxiety 6/15/2015    Behavioral problem     Bipolar disorder     Bipolar disorder in remission 6/15/2015    Cirrhosis, Laennec's 6/15/2015    Depression     Encounter for blood transfusion     Epistaxis 6/15/2015    Fatigue     Glaucoma     Hematuria     Hepatic encephalopathy 6/15/2015    Hepatic enlargement     History of psychiatric care     History of psychiatric hospitalization     History of seizure 6/15/2015    1    hx of intentional Tylenol overdose 6/15/2015    2005- situational and hx of bipolar    Hx of psychiatric care     Macrocytic anemia 9/18/2015    6 units PRBC since June 2015    Jeana     Osteoarthritis     Other ascites 6/15/2015    Psychiatric exam requested by authority     Psychiatric problem     Psychosis 9/26/2019    Renal disorder     Seizures     Self-harming behavior     Suicide attempt     Therapy     Thrombocytopenia 6/15/2015     Past Surgical History:   Procedure Laterality Date    COSMETIC SURGERY      EMBOLIZATION N/A 6/11/2023    Procedure: EMBOLIZATION, BLOOD VESSEL;  Surgeon: Debbie Surgeon;  Location: [facility];  Service: Anesthesiology;  Laterality: N/A;    ESOPHAGOGASTRODUODENOSCOPY      ESOPHAGOGASTRODUODENOSCOPY N/A 7/6/2023    Procedure: EGD (ESOPHAGOGASTRODUODENOSCOPY);  Surgeon: Bernice Guillen MD;  Location: [address]);  Service: Endoscopy;  Laterality: N/A;    ESOPHAGOGASTRODUODENOSCOPY N/A 7/11/2023    Procedure: EGD (ESOPHAGOGASTRODUODENOSCOPY);  Surgeon: Rangel Broussard MD;  Location: [address]);  Service: Endoscopy;  Laterality: N/A;     Review of patient's allergies indicates:   Allergen Reactions    Sulfa (sulfonamide antibiotics) Rash    Codeine Nausea And Vomiting     No current facility-administered medications on file prior to encounter.     Current Outpatient Medications on File  Prior to Encounter   Medication Sig    ALPRAZolam (XANAX) 0.25 MG tablet Take 1 tablet (0.25 mg total) by mouth 2 (two) times daily as needed for Anxiety.    brimonidine-timoloL (COMBIGAN) 0.2-0.5 % Drop Place 1 drop into both eyes 2 (two) times a day.    ferrous sulfate (FEOSOL) 325 mg (65 mg iron) Tab tablet Take 325 mg by mouth once daily.    lactulose (CHRONULAC) 10 gram/15 mL solution Take 45 mLs (30 g total) by mouth 3 (three) times daily.    levetiracetam 500 mg/5 mL (5 mL) Soln Take 10 mLs (1,000 mg total) by mouth 2 (two) times daily.    lithium (LITHOBID) 300 MG CR tablet Take 1 tablet (300 mg total) by mouth every evening.    miconazole nitrate 2% (MICOTIN) 2 % Oint Apply topically 2 (two) times daily.    OLANZapine (ZYPREXA) 5 MG tablet Take 1 tablet (5 mg total) by mouth every evening.    pantoprazole (PROTONIX) 40 mg suspension Take 1 packet (40 mg total) by mouth 2 (two) times daily.    rifAXIMin (XIFAXAN) 550 mg Tab Take 1 tablet (550 mg total) by mouth 2 (two) times daily.    thiamine 100 MG tablet Take 1 tablet (100 mg total) by mouth once daily.    vitamin D (VITAMIN D3) 50 mcg (2,000 unit) Tab Take 1 tablet (2,000 Units total) by mouth once daily.     Tobacco Use    Smoking status: Never    Smokeless tobacco: Never   Substance and Sexual Activity    Alcohol use: Not Currently     Comment: hx of ETOH abuse with cirrhosis of liver    Drug use: No    Sexual activity: Not Currently     Review of Systems   Constitutional:  Negative for activity change, chills and fever.   HENT:  Negative for congestion.    Eyes:  Negative for visual disturbance.   Respiratory:  Positive for shortness of breath. Negative for cough and chest tightness.    Cardiovascular:  Negative for chest pain, palpitations and leg swelling.   Gastrointestinal:  Positive for abdominal distention. Negative for abdominal pain, blood in stool, constipation, diarrhea, nausea and vomiting.   Genitourinary:  Negative for dysuria, frequency,  hematuria and urgency.   Musculoskeletal:  Negative for arthralgias, back pain, gait problem and neck pain.   Skin:  Positive for color change. Negative for rash and wound.   Neurological:  Positive for tremors. Negative for dizziness, seizures, syncope, facial asymmetry, speech difficulty and light-headedness.   Psychiatric/Behavioral:  Negative for agitation and confusion. The patient is not nervous/anxious.      Objective:     Vital Signs (Most Recent):  Temp: 99.2 °F (37.3 °C) (01/04/24 2133)  Pulse: 88 (01/05/24 0045)  Resp: 12 (01/05/24 0045)  BP: (!) 117/57 (01/05/24 0045)  SpO2: 95 % (01/05/24 0045) Vital Signs (24h Range):  Temp:  [99.2 °F (37.3 °C)] 99.2 °F (37.3 °C)  Pulse:  [88] 88  Resp:  [12-14] 12  SpO2:  [95 %-97 %] 95 %  BP: (114-117)/(57) 117/57     There is no height or weight on file to calculate BMI.    Physical Exam  Vitals reviewed.   Constitutional:       General: She is not in acute distress.     Appearance: She is cachectic. She is ill-appearing.   HENT:      Head: Normocephalic and atraumatic.      Mouth/Throat:      Mouth: Mucous membranes are dry.      Pharynx: No oropharyngeal exudate.   Eyes:      Extraocular Movements: Extraocular movements intact.      Conjunctiva/sclera: Conjunctivae normal.      Pupils: Pupils are equal, round, and reactive to light.   Cardiovascular:      Rate and Rhythm: Normal rate and regular rhythm.      Heart sounds: Normal heart sounds. No murmur heard.  Pulmonary:      Effort: Pulmonary effort is normal. No respiratory distress.      Breath sounds: No wheezing or rales.   Abdominal:      General: Bowel sounds are normal. There is distension.      Palpations: Abdomen is soft.      Tenderness: There is no abdominal tenderness. There is no right CVA tenderness, left CVA tenderness or guarding.   Musculoskeletal:         General: No tenderness. Normal range of motion.      Cervical back: Normal range of motion and neck supple.   Lymphadenopathy:      Cervical:  No cervical adenopathy.   Skin:     General: Skin is warm and dry.      Capillary Refill: Capillary refill takes less than 2 seconds.      Coloration: Skin is jaundiced.      Findings: Ecchymosis present. No rash.   Neurological:      General: No focal deficit present.      Mental Status: She is oriented to person, place, and time.      Motor: Weakness (bilateral lower extremities) present.      Comments: Poor participation   Psychiatric:         Behavior: Behavior is cooperative.         Thought Content: Thought content normal.         Judgment: Judgment normal.         CRANIAL NERVES     CN III, IV, VI   Pupils are equal, round, and reactive to light.    Significant Labs: All pertinent labs within the past 24 hours have been reviewed.  Bilirubin:   Recent Labs   Lab 12/06/23  0352 12/07/23  0752 12/08/23  0439 01/05/24  0050   BILIDIR  --  1.5*  --   --    BILITOT 3.6* 3.4* 3.7* 12.1*     CBC:   Recent Labs   Lab 01/05/24  0050 01/05/24  0100   WBC 3.38*  --    HGB 8.7*  --    HCT 25.6* 26*   PLT 42*  --      CMP:   Recent Labs   Lab 01/05/24  0050      K 3.5   *   CO2 22*   GLU 89   BUN 6   CREATININE 0.4*   CALCIUM 7.8*   PROT 4.7*   ALBUMIN 1.7*   BILITOT 12.1*   ALKPHOS 200*   *   ALT 41   ANIONGAP 7*     Coagulation:   Recent Labs   Lab 01/05/24  0050   INR 1.8*     Lactic Acid:   Recent Labs   Lab 01/05/24  0050   LACTATE 1.0     Significant Imaging: I have reviewed all pertinent imaging results/findings within the past 24 hours.

## 2024-01-05 NOTE — ED PROVIDER NOTES
Encounter Date: 1/4/2024       History     Chief Complaint   Patient presents with    Ascites     From [facility] for abd distention, BLE swelling, and jaudice. Hx of cirrhosis.      HPI    This is a 58-year-old female with multiple medical problems including bipolar disorder, history of alcohol abuse, liver failure with ascites, currently in [facility], he was sent in for evaluation of abdominal distension lower leg swelling and jaundice.  Patient denies fever chills or abdominal pain.    Review of patient's allergies indicates:   Allergen Reactions    Sulfa (sulfonamide antibiotics) Rash    Codeine Nausea And Vomiting     Past Medical History:   Diagnosis Date    Addiction to drug     Alcohol abuse     Alcohol abuse, in remission 6/15/2015    14.5 weeks ago; AA weekly    Anemia     Anxiety 6/15/2015    Behavioral problem     Bipolar disorder     Bipolar disorder in remission 6/15/2015    Cirrhosis, Laennec's 6/15/2015    Depression     Encounter for blood transfusion     Epistaxis 6/15/2015    Fatigue     Glaucoma     Hematuria     Hepatic encephalopathy 6/15/2015    Hepatic enlargement     History of psychiatric care     History of psychiatric hospitalization     History of seizure 6/15/2015    1    hx of intentional Tylenol overdose 6/15/2015    2005- situational and hx of bipolar    Hx of psychiatric care     Macrocytic anemia 9/18/2015    6 units PRBC since June 2015    Jeana     Osteoarthritis     Other ascites 6/15/2015    Psychiatric exam requested by authority     Psychiatric problem     Psychosis 9/26/2019    Renal disorder     Seizures     Self-harming behavior     Suicide attempt     Therapy     Thrombocytopenia 6/15/2015     Past Surgical History:   Procedure Laterality Date    COSMETIC SURGERY      EMBOLIZATION N/A 6/11/2023    Procedure: EMBOLIZATION, BLOOD VESSEL;  Surgeon: Debbie Surgeon;  Location: [facility];  Service: Anesthesiology;  Laterality: N/A;     ESOPHAGOGASTRODUODENOSCOPY      ESOPHAGOGASTRODUODENOSCOPY N/A 7/6/2023    Procedure: EGD (ESOPHAGOGASTRODUODENOSCOPY);  Surgeon: Bernice Guillen MD;  Location: [facility] ([address] [place] [address]);  Service: Endoscopy;  Laterality: N/A;    ESOPHAGOGASTRODUODENOSCOPY N/A 7/11/2023    Procedure: EGD (ESOPHAGOGASTRODUODENOSCOPY);  Surgeon: Rangel Broussard MD;  Location: [facility] ([facility]  Service: Endoscopy;  Laterality: N/A;     Family History   Problem Relation Age of Onset    Alcohol abuse Father     Suicide Father     Bipolar disorder Father     Alcohol abuse Sister     Hypertension Mother     Alcohol abuse Paternal Grandfather     Drug abuse Neg Hx     Dementia Neg Hx      Social History     Tobacco Use    Smoking status: Never    Smokeless tobacco: Never   Substance Use Topics    Alcohol use: Not Currently     Comment: hx of ETOH abuse with cirrhosis of liver    Drug use: No     Review of Systems   Constitutional:  Positive for fatigue. Negative for fever.   All other systems reviewed and are negative.      Physical Exam     Initial Vitals [01/04/24 2133]   BP Pulse Resp Temp SpO2   (!) 114/57 88 14 99.2 °F (37.3 °C) 97 %      MAP       --         Physical Exam    Nursing note and vitals reviewed.  Constitutional:   Elderly, chronically ill-appearing, jaundiced   HENT:   Head: Normocephalic and atraumatic.   Eyes: Pupils are equal, round, and reactive to light. Scleral icterus is present.   Neck:   Normal range of motion.  Cardiovascular:  Normal rate and regular rhythm.           Pulmonary/Chest: Breath sounds normal. No respiratory distress.   Abdominal: Abdomen is soft.   Mild abdominal distention, no significant pain or discomfort on palpation   Musculoskeletal:      Cervical back: Normal range of motion.      Comments: Chronic wasting of extremities     Neurological: She is alert.   Skin: Skin is warm and dry.         ED Course   Procedures  Labs Reviewed   CBC W/ AUTO DIFFERENTIAL - Abnormal; Notable for the  following components:       Result Value    WBC 3.38 (*)     RBC 2.26 (*)     Hemoglobin 8.7 (*)     Hematocrit 25.6 (*)      (*)     MCH 38.5 (*)     RDW 15.9 (*)     Platelets 42 (*)     Immature Granulocytes 0.9 (*)     Lymph # 0.6 (*)     Lymph % 16.9 (*)     All other components within normal limits   COMPREHENSIVE METABOLIC PANEL - Abnormal; Notable for the following components:    Chloride 113 (*)     CO2 22 (*)     Creatinine 0.4 (*)     Calcium 7.8 (*)     Total Protein 4.7 (*)     Albumin 1.7 (*)     Total Bilirubin 12.1 (*)     Alkaline Phosphatase 200 (*)      (*)     Anion Gap 7 (*)     All other components within normal limits   PROTIME-INR - Abnormal; Notable for the following components:    Prothrombin Time 18.9 (*)     INR 1.8 (*)     All other components within normal limits   AMMONIA - Abnormal; Notable for the following components:    Ammonia 114 (*)     All other components within normal limits   COMPREHENSIVE METABOLIC PANEL - Abnormal; Notable for the following components:    Chloride 113 (*)     CO2 22 (*)     Creatinine 0.4 (*)     Calcium 7.9 (*)     Total Protein 4.8 (*)     Albumin 1.8 (*)     Total Bilirubin 12.2 (*)     Alkaline Phosphatase 195 (*)      (*)     Anion Gap 7 (*)     All other components within normal limits   BILIRUBIN, DIRECT - Abnormal; Notable for the following components:    Bilirubin, Direct 7.4 (*)     All other components within normal limits   CBC W/ AUTO DIFFERENTIAL - Abnormal; Notable for the following components:    WBC 3.18 (*)     RBC 2.43 (*)     Hemoglobin 9.2 (*)     Hematocrit 27.7 (*)      (*)     MCH 37.9 (*)     RDW 15.9 (*)     Platelets 47 (*)     Immature Granulocytes 0.6 (*)     Lymph # 0.5 (*)     Lymph % 14.8 (*)     All other components within normal limits   PROTIME-INR - Abnormal; Notable for the following components:    Prothrombin Time 18.5 (*)     INR 1.8 (*)     All other components within normal limits   APTT  "- Abnormal; Notable for the following components:    aPTT 32.2 (*)     All other components within normal limits   ISTAT PROCEDURE - Abnormal; Notable for the following components:    POC PH 7.476 (*)     POC PCO2 32.8 (*)     POC Hematocrit 26 (*)     All other components within normal limits   CULTURE, AEROBIC  (SPECIFY SOURCE)   CULTURE, ANAEROBIC   GRAM STAIN   AFB CULTURE & SMEAR   LACTIC ACID, PLASMA   TROPONIN I   B-TYPE NATRIURETIC PEPTIDE   LITHIUM LEVEL   URINALYSIS, REFLEX TO URINE CULTURE   WBC & DIFF, BODY FLUID   POCT GLUCOSE, HAND-HELD DEVICE          Imaging Results              X-Ray Chest AP Portable (Final result)  Result time 01/05/24 01:28:46      Final result by Marvin Urban MD (01/05/24 01:28:46)                   Impression:      Moderate to large right pleural effusion with passive atelectasis or airspace disease in the right mid and lower lung zone.    Mild enlargement of the cardiomediastinal silhouette.      Electronically signed by: Marvin Urban MD  Date:    01/05/2024  Time:    01:28               Narrative:    EXAMINATION:  XR CHEST AP PORTABLE    CLINICAL HISTORY:  Provided history is "  Hypoxemia".    TECHNIQUE:  One view of the chest.    COMPARISON:  11/29/2023.    FINDINGS:  Cardiac wires overlie the chest.  Exam quality is limited by suboptimal positioning.  Cardiomediastinal silhouette is magnified by portable technique and is likely mildly enlarged.  There is a moderate to large right pleural effusion with passive atelectasis or airspace disease in the right mid and lower lung zone.  Left lung is grossly clear.  No large left pleural effusion.  No distinct pneumothorax.                                       Medications   lactulose 20 gram/30 mL solution Soln 30 g (has no administration in time range)   ALPRAZolam tablet 0.25 mg (has no administration in time range)   OLANZapine tablet 5 mg (5 mg Oral Given 1/5/24 7460)   lithium CR tablet 300 mg (has no administration " in time range)   levetiracetam 500 mg/5 mL (5 mL) liquid Soln 1,000 mg (has no administration in time range)   pantoprazole suspension 40 mg (has no administration in time range)   rifAXIMin tablet 550 mg (has no administration in time range)   thiamine tablet 100 mg (has no administration in time range)   vitamin D 1000 units tablet 2,000 Units (has no administration in time range)   sodium chloride 0.9% flush 10 mL (has no administration in time range)   insulin aspart U-100 pen 0-5 Units (has no administration in time range)   glucose chewable tablet 16 g (has no administration in time range)   glucose chewable tablet 24 g (has no administration in time range)   glucagon (human recombinant) injection 1 mg (has no administration in time range)   cefTRIAXone (ROCEPHIN) 1 g in dextrose 5 % in water (D5W) 100 mL IVPB (MB+) (1 g Intravenous New Bag 1/5/24 7999)     Medical Decision Making  This is a 58-year-old female multiple medical problems including previous history of polysubstance abuse, bipolar, liver disease, ascites presents the ER for evaluation of concern of worsening jaundice abdominal distention lower leg swelling.  Patient denies any complaints.  She was significantly jaundice on exam.  Her abdomen is soft nontender, no peritoneal signs.  Differential includes decompensated liver failure, electrolyte abnormality, infection, other cause.  Will plan blood work observation symptomatic support low threshold for admission.    Amount and/or Complexity of Data Reviewed  Labs: ordered. Decision-making details documented in ED Course.  Radiology: ordered. Decision-making details documented in ED Course.               ED Course as of 01/05/24 0505   Fri Jan 05, 2024   0204 CBC W/ AUTO DIFFERENTIAL(!) [SE]   0205 Protime-INR(!) [SE]   0205 Comp. Metabolic Panel(!) [SE]   0205 ISTAT PROCEDURE(!) [SE]   0205 Brain natriuretic peptide [SE]   0205 Troponin I [SE]   0205 Lactic acid, plasma [SE]   0205 X-Ray Chest AP  Portable [SE]   0205 Resting in bed no acute distress.  Labs imaging obtained and reviewed.  Large pleural effusion noted, likely the source of hypoxia and shortness of breath.  Patient decompensated liver failure with elevated liver enzymes and bilirubin, meld score 22.  Will plan admission [SE]      ED Course User Index  [SE] Yazmin aWddell MD                             Clinical Impression:  Final diagnoses:  [R53.1] Weakness  [R09.02] Hypoxia  [K72.90] Liver failure (Primary)  [J90] Pleural effusion          ED Disposition Condition    Observation Stable                Yazmin Waddell MD  01/05/24 6279

## 2024-01-05 NOTE — ASSESSMENT & PLAN NOTE
Patient with chronic alcoholic cirrhosis. Presenting with decompensation (increased ascites, Bili 12, ammonia 114). Continue to monitor liver function while treating underlying condition. Daily labs.  -consult IR for therapeutic thoracentesis as moderate pleural effusion likely cause of shortness of breath. Consider paracentesis but no positive fluid wave on exam.  -trend labs  -restart lactulose that patient has not been taking  -consult hepatology for recs, Tbili significantly elevated compared to prior  -MELD-NA 22. Rocephin ppx, de-escalate if no clinical concern for SBP. Continue home rifampin.

## 2024-01-05 NOTE — PLAN OF CARE
[facility] - Emergency Dept  Discharge Assessment    Primary Care Provider: Viktor Ross MD     Pt is a nursing home resident of [facility] and will return on d/c     Pt to return to nursing home care when ready    Discharge Assessment (most recent)       BRIEF DISCHARGE ASSESSMENT - 01/05/24 1121          Discharge Planning    Assessment Type Discharge Planning Brief Assessment (P)      Resource/Environmental Concerns none (P)      Support Systems Family members;Other (Comment) (P)    NH staff    Equipment Currently Used at Home none (P)      Current Living Arrangements residential facility (P)      Care Facility Name [facility] (P)      Patient/Family Anticipates Transition to long-term care facility (P)      Patient/Family Anticipated Services at Transition none (P)      DME Needed Upon Discharge  none (P)      Discharge Plan A Return to nursing home (P)      Discharge Plan B Return to Nursing Home (P)                      Chelsea Villafana CD, MSW, LMSW, RSW   Case Management  [facility]  Email: [email or web address]

## 2024-01-05 NOTE — ED NOTES
Pt to FLEX room 4 from IR. Upon arrival, pt noticeably jaundiced. Lethargic, oriented x3 on 4L NC. VSS. Morning meds being given, labs sent. Will continue to monitor.

## 2024-01-05 NOTE — H&P
Inpatient Radiology Pre-procedure Note    History of Present Illness:  Marely Hamilton is a 58 y.o. female who presents for US guided paracentesis and RIGHT thoracentesis.    Admission H&P reviewed.  Past Medical History:   Diagnosis Date    Addiction to drug     Alcohol abuse     Alcohol abuse, in remission 6/15/2015    14.5 weeks ago; AA weekly    Anemia     Anxiety 6/15/2015    Behavioral problem     Bipolar disorder     Bipolar disorder in remission 6/15/2015    Cirrhosis, Laennec's 6/15/2015    Depression     Encounter for blood transfusion     Epistaxis 6/15/2015    Fatigue     Glaucoma     Hematuria     Hepatic encephalopathy 6/15/2015    Hepatic enlargement     History of psychiatric care     History of psychiatric hospitalization     History of seizure 6/15/2015    1    hx of intentional Tylenol overdose 6/15/2015    2005- situational and hx of bipolar    Hx of psychiatric care     Macrocytic anemia 9/18/2015    6 units PRBC since June 2015    Jeana     Osteoarthritis     Other ascites 6/15/2015    Psychiatric exam requested by authority     Psychiatric problem     Psychosis 9/26/2019    Renal disorder     Seizures     Self-harming behavior     Suicide attempt     Therapy     Thrombocytopenia 6/15/2015     Past Surgical History:   Procedure Laterality Date    COSMETIC SURGERY      EMBOLIZATION N/A 6/11/2023    Procedure: EMBOLIZATION, BLOOD VESSEL;  Surgeon: Debbie Surgeon;  Location: [facility];  Service: Anesthesiology;  Laterality: N/A;    ESOPHAGOGASTRODUODENOSCOPY      ESOPHAGOGASTRODUODENOSCOPY N/A 7/6/2023    Procedure: EGD (ESOPHAGOGASTRODUODENOSCOPY);  Surgeon: Bernice Guillen MD;  Location: [address]);  Service: Endoscopy;  Laterality: N/A;    ESOPHAGOGASTRODUODENOSCOPY N/A 7/11/2023    Procedure: EGD (ESOPHAGOGASTRODUODENOSCOPY);  Surgeon: Rangel Broussard MD;  Location: [address]);  Service: Endoscopy;  Laterality: N/A;       Review of Systems:   As documented in primary team  H&P    Home Meds:   Prior to Admission medications    Medication Sig Start Date End Date Taking? Authorizing Provider   ALPRAZolam (XANAX) 0.25 MG tablet Take 1 tablet (0.25 mg total) by mouth 2 (two) times daily as needed for Anxiety. 12/8/23 1/7/24  Nathaly Roberto MD   brimonidine-timoloL (COMBIGAN) 0.2-0.5 % Drop Place 1 drop into both eyes 2 (two) times a day.    Provider, Historical   ferrous sulfate (FEOSOL) 325 mg (65 mg iron) Tab tablet Take 325 mg by mouth once daily.    Provider, Historical   lactulose (CHRONULAC) 10 gram/15 mL solution Take 45 mLs (30 g total) by mouth 3 (three) times daily. 12/8/23 1/7/24  Nathaly Roberto MD   levetiracetam 500 mg/5 mL (5 mL) Soln Take 10 mLs (1,000 mg total) by mouth 2 (two) times daily. 8/1/23 7/31/24  Seth Noyola MD   lithium (LITHOBID) 300 MG CR tablet Take 1 tablet (300 mg total) by mouth every evening. 12/8/23 7/5/24  Nathaly Roberto MD   miconazole nitrate 2% (MICOTIN) 2 % Oint Apply topically 2 (two) times daily. 9/21/23   Lenka Ortiz MD   OLANZapine (ZYPREXA) 5 MG tablet Take 1 tablet (5 mg total) by mouth every evening. 12/8/23 12/7/24  Nathaly Roberto MD   pantoprazole (PROTONIX) 40 mg suspension Take 1 packet (40 mg total) by mouth 2 (two) times daily. 12/8/23 12/7/24  Nathaly Roberto MD   rifAXIMin (XIFAXAN) 550 mg Tab Take 1 tablet (550 mg total) by mouth 2 (two) times daily. 8/1/23   Seth Noyola MD   thiamine 100 MG tablet Take 1 tablet (100 mg total) by mouth once daily. 12/8/23 1/7/24  Nathaly Roberto MD   vitamin D (VITAMIN D3) 50 mcg (2,000 unit) Tab Take 1 tablet (2,000 Units total) by mouth once daily. 12/9/23   Natahly Roberto MD     Scheduled Meds:    cefTRIAXone (ROCEPHIN) IVPB  1 g Intravenous Q24H    lactulose  30 g Oral TID    levetiracetam  1,000 mg Oral BID    lithium  300 mg Oral QHS    OLANZapine  5 mg Oral QHS    pantoprazole  40 mg Oral BID    rifAXIMin  550 mg Oral BID    thiamine  100 mg Oral Daily     vitamin D  2,000 Units Oral Daily     Continuous Infusions:   PRN Meds:ALPRAZolam, glucagon (human recombinant), glucose, glucose, insulin aspart U-100, sodium chloride 0.9%  Anticoagulants/Antiplatelets: no anticoagulation    Allergies:   Review of patient's allergies indicates:   Allergen Reactions    Sulfa (sulfonamide antibiotics) Rash    Codeine Nausea And Vomiting     Sedation Hx: have not been any systemic reactions    Vitals:  Temp: 99.2 °F (37.3 °C) (01/04/24 2133)  Pulse: 88 (01/05/24 0911)  Resp: 18 (01/05/24 0911)  BP: (!) 124/58 (01/05/24 0911)  SpO2: (!) 93 % (01/05/24 0911)     Physical Exam:  ASA: 2  Mallampati: n/a    General: no acute distress  Mental Status: alert and oriented to person, place and time  HEENT: normocephalic, atraumatic  Chest: unlabored breathing  Heart: regular heart rate  Abdomen: distended  Extremity: moves all extremities    Plan: US guided paracentesis and right thoracentesis.     Sedation Plan: local    Italia Caban PA-C  Interventional Radiology  [phone]

## 2024-01-05 NOTE — ASSESSMENT & PLAN NOTE
Continue home regimen: lithium, olanzapine, keppra.  -lithium level pending  -A&Ox3 but some delusions when asked why she's not on her lactulose.

## 2024-01-05 NOTE — PLAN OF CARE
Patient tolerated right thoracentesis well. VSS. 650ml pleural fluid drained. Specimens collected and sent to lab as ordered. Pending chest xray.

## 2024-01-05 NOTE — PLAN OF CARE
Provider assessed patient and found no safe fluid pocket for paracentesis and/or lab collection. Paracentesis not performed. Will assess with ultrasound for thoracentesis.

## 2024-01-06 PROBLEM — K74.60 DECOMPENSATED HEPATIC CIRRHOSIS: Status: ACTIVE | Noted: 2024-01-06

## 2024-01-06 PROBLEM — K72.00 ACUTE LIVER FAILURE WITHOUT HEPATIC COMA: Status: RESOLVED | Noted: 2023-06-03 | Resolved: 2024-01-06

## 2024-01-06 PROBLEM — K72.90 DECOMPENSATED HEPATIC CIRRHOSIS: Status: ACTIVE | Noted: 2024-01-06

## 2024-01-06 LAB
ALBUMIN FLD-MCNC: 0.4 G/DL
ALBUMIN SERPL BCP-MCNC: 1.7 G/DL (ref 3.5–5.2)
ALP SERPL-CCNC: 190 U/L (ref 55–135)
ALT SERPL W/O P-5'-P-CCNC: 34 U/L (ref 10–44)
ANION GAP SERPL CALC-SCNC: 9 MMOL/L (ref 8–16)
APPEARANCE FLD: CLEAR
APTT PPP: 34.2 SEC (ref 21–32)
AST SERPL-CCNC: 82 U/L (ref 10–40)
BASOPHILS # BLD AUTO: 0.02 K/UL (ref 0–0.2)
BASOPHILS NFR BLD: 0.6 % (ref 0–1.9)
BILIRUB SERPL-MCNC: 15 MG/DL (ref 0.1–1)
BODY FLD TYPE: NORMAL
BODY FLUID SOURCE, LDH: NORMAL
BUN SERPL-MCNC: 7 MG/DL (ref 6–20)
CALCIUM SERPL-MCNC: 7.8 MG/DL (ref 8.7–10.5)
CHLORIDE SERPL-SCNC: 114 MMOL/L (ref 95–110)
CO2 SERPL-SCNC: 21 MMOL/L (ref 23–29)
COLOR FLD: YELLOW
CREAT SERPL-MCNC: 0.3 MG/DL (ref 0.5–1.4)
DIFFERENTIAL METHOD BLD: ABNORMAL
EOSINOPHIL # BLD AUTO: 0.2 K/UL (ref 0–0.5)
EOSINOPHIL NFR BLD: 4.4 % (ref 0–8)
ERYTHROCYTE [DISTWIDTH] IN BLOOD BY AUTOMATED COUNT: 16 % (ref 11.5–14.5)
EST. GFR  (NO RACE VARIABLE): >60 ML/MIN/1.73 M^2
GLUCOSE SERPL-MCNC: 126 MG/DL (ref 70–110)
HCT VFR BLD AUTO: 25.6 % (ref 37–48.5)
HGB BLD-MCNC: 8.6 G/DL (ref 12–16)
IMM GRANULOCYTES # BLD AUTO: 0.04 K/UL (ref 0–0.04)
IMM GRANULOCYTES NFR BLD AUTO: 1.2 % (ref 0–0.5)
INR PPP: 1.8 (ref 0.8–1.2)
LDH FLD L TO P-CCNC: 76 U/L
LYMPHOCYTES # BLD AUTO: 0.4 K/UL (ref 1–4.8)
LYMPHOCYTES NFR BLD: 10.2 % (ref 18–48)
MAGNESIUM SERPL-MCNC: 1.8 MG/DL (ref 1.6–2.6)
MCH RBC QN AUTO: 37.7 PG (ref 27–31)
MCHC RBC AUTO-ENTMCNC: 33.6 G/DL (ref 32–36)
MCV RBC AUTO: 112 FL (ref 82–98)
MONOCYTES # BLD AUTO: 0.3 K/UL (ref 0.3–1)
MONOCYTES NFR BLD: 9.4 % (ref 4–15)
MONOS+MACROS NFR FLD MANUAL: 96 %
NEUTROPHILS # BLD AUTO: 2.5 K/UL (ref 1.8–7.7)
NEUTROPHILS NFR BLD: 74.2 % (ref 38–73)
NEUTROPHILS NFR FLD MANUAL: 4 %
NRBC BLD-RTO: 0 /100 WBC
PHOSPHATE SERPL-MCNC: 2.5 MG/DL (ref 2.7–4.5)
PLATELET # BLD AUTO: 42 K/UL (ref 150–450)
PMV BLD AUTO: 9.1 FL (ref 9.2–12.9)
POCT GLUCOSE: 116 MG/DL (ref 70–110)
POCT GLUCOSE: 162 MG/DL (ref 70–110)
POCT GLUCOSE: 183 MG/DL (ref 70–110)
POCT GLUCOSE: 199 MG/DL (ref 70–110)
POTASSIUM SERPL-SCNC: 3 MMOL/L (ref 3.5–5.1)
PROT SERPL-MCNC: 4.5 G/DL (ref 6–8.4)
PROTHROMBIN TIME: 19 SEC (ref 9–12.5)
RBC # BLD AUTO: 2.28 M/UL (ref 4–5.4)
SODIUM SERPL-SCNC: 144 MMOL/L (ref 136–145)
SPECIMEN SOURCE: NORMAL
WBC # BLD AUTO: 3.42 K/UL (ref 3.9–12.7)
WBC # FLD: 60 /CU MM

## 2024-01-06 PROCEDURE — 99232 SBSQ HOSP IP/OBS MODERATE 35: CPT | Mod: GC,,, | Performed by: INTERNAL MEDICINE

## 2024-01-06 PROCEDURE — 20600001 HC STEP DOWN PRIVATE ROOM

## 2024-01-06 PROCEDURE — 25000003 PHARM REV CODE 250: Performed by: STUDENT IN AN ORGANIZED HEALTH CARE EDUCATION/TRAINING PROGRAM

## 2024-01-06 PROCEDURE — 80053 COMPREHEN METABOLIC PANEL: CPT | Performed by: STUDENT IN AN ORGANIZED HEALTH CARE EDUCATION/TRAINING PROGRAM

## 2024-01-06 PROCEDURE — 85610 PROTHROMBIN TIME: CPT | Performed by: STUDENT IN AN ORGANIZED HEALTH CARE EDUCATION/TRAINING PROGRAM

## 2024-01-06 PROCEDURE — 83735 ASSAY OF MAGNESIUM: CPT | Performed by: STUDENT IN AN ORGANIZED HEALTH CARE EDUCATION/TRAINING PROGRAM

## 2024-01-06 PROCEDURE — 84100 ASSAY OF PHOSPHORUS: CPT | Performed by: STUDENT IN AN ORGANIZED HEALTH CARE EDUCATION/TRAINING PROGRAM

## 2024-01-06 PROCEDURE — 63600175 PHARM REV CODE 636 W HCPCS: Performed by: STUDENT IN AN ORGANIZED HEALTH CARE EDUCATION/TRAINING PROGRAM

## 2024-01-06 PROCEDURE — 85730 THROMBOPLASTIN TIME PARTIAL: CPT | Performed by: STUDENT IN AN ORGANIZED HEALTH CARE EDUCATION/TRAINING PROGRAM

## 2024-01-06 PROCEDURE — 85025 COMPLETE CBC W/AUTO DIFF WBC: CPT | Performed by: STUDENT IN AN ORGANIZED HEALTH CARE EDUCATION/TRAINING PROGRAM

## 2024-01-06 PROCEDURE — 87086 URINE CULTURE/COLONY COUNT: CPT | Performed by: STUDENT IN AN ORGANIZED HEALTH CARE EDUCATION/TRAINING PROGRAM

## 2024-01-06 PROCEDURE — P9047 ALBUMIN (HUMAN), 25%, 50ML: HCPCS | Mod: JZ,JG | Performed by: STUDENT IN AN ORGANIZED HEALTH CARE EDUCATION/TRAINING PROGRAM

## 2024-01-06 PROCEDURE — 36415 COLL VENOUS BLD VENIPUNCTURE: CPT | Performed by: STUDENT IN AN ORGANIZED HEALTH CARE EDUCATION/TRAINING PROGRAM

## 2024-01-06 RX ORDER — FOLIC ACID 1 MG/1
1 TABLET ORAL DAILY
Status: DISCONTINUED | OUTPATIENT
Start: 2024-01-06 | End: 2024-01-11

## 2024-01-06 RX ORDER — POTASSIUM CHLORIDE 20 MEQ/1
40 TABLET, EXTENDED RELEASE ORAL EVERY 4 HOURS
Status: COMPLETED | OUTPATIENT
Start: 2024-01-06 | End: 2024-01-06

## 2024-01-06 RX ORDER — ALBUMIN HUMAN 250 G/1000ML
25 SOLUTION INTRAVENOUS ONCE
Status: COMPLETED | OUTPATIENT
Start: 2024-01-06 | End: 2024-01-06

## 2024-01-06 RX ORDER — MAGNESIUM SULFATE HEPTAHYDRATE 40 MG/ML
2 INJECTION, SOLUTION INTRAVENOUS ONCE
Status: COMPLETED | OUTPATIENT
Start: 2024-01-06 | End: 2024-01-06

## 2024-01-06 RX ADMIN — CEFTRIAXONE 1 G: 1 INJECTION, POWDER, FOR SOLUTION INTRAMUSCULAR; INTRAVENOUS at 04:01

## 2024-01-06 RX ADMIN — LITHIUM CARBONATE 300 MG: 300 TABLET, FILM COATED, EXTENDED RELEASE ORAL at 09:01

## 2024-01-06 RX ADMIN — ALPRAZOLAM 0.25 MG: 0.25 TABLET ORAL at 05:01

## 2024-01-06 RX ADMIN — OLANZAPINE 5 MG: 5 TABLET, FILM COATED ORAL at 09:01

## 2024-01-06 RX ADMIN — POTASSIUM CHLORIDE 40 MEQ: 1500 TABLET, EXTENDED RELEASE ORAL at 03:01

## 2024-01-06 RX ADMIN — ALBUMIN (HUMAN) 25 G: 12.5 SOLUTION INTRAVENOUS at 01:01

## 2024-01-06 RX ADMIN — LEVETIRACETAM 1000 MG: 100 SOLUTION ORAL at 08:01

## 2024-01-06 RX ADMIN — MAGNESIUM SULFATE HEPTAHYDRATE 2 G: 40 INJECTION, SOLUTION INTRAVENOUS at 10:01

## 2024-01-06 RX ADMIN — Medication 100 MG: at 08:01

## 2024-01-06 RX ADMIN — PANTOPRAZOLE SODIUM 40 MG: 40 GRANULE, DELAYED RELEASE ORAL at 09:01

## 2024-01-06 RX ADMIN — LACTULOSE 30 G: 20 SOLUTION ORAL at 09:01

## 2024-01-06 RX ADMIN — FOLIC ACID 1 MG: 1 TABLET ORAL at 09:01

## 2024-01-06 RX ADMIN — RIFAXIMIN 550 MG: 550 TABLET ORAL at 08:01

## 2024-01-06 RX ADMIN — LEVETIRACETAM 1000 MG: 100 SOLUTION ORAL at 09:01

## 2024-01-06 RX ADMIN — CEFTRIAXONE 1 G: 1 INJECTION, POWDER, FOR SOLUTION INTRAMUSCULAR; INTRAVENOUS at 10:01

## 2024-01-06 RX ADMIN — PANTOPRAZOLE SODIUM 40 MG: 40 GRANULE, DELAYED RELEASE ORAL at 08:01

## 2024-01-06 RX ADMIN — POTASSIUM CHLORIDE 40 MEQ: 1500 TABLET, EXTENDED RELEASE ORAL at 09:01

## 2024-01-06 RX ADMIN — RIFAXIMIN 550 MG: 550 TABLET ORAL at 09:01

## 2024-01-06 RX ADMIN — CHOLECALCIFEROL TAB 25 MCG (1000 UNIT) 2000 UNITS: 25 TAB at 08:01

## 2024-01-06 NOTE — ASSESSMENT & PLAN NOTE
Malnutrition Type:  Context: chronic illness  Level: severe    Related to (etiology):   ESLD    Signs and Symptoms (as evidenced by):   Moderate to severe muscle/fat wasting, dull and thinning hair, spoon nails, reduced hand      Malnutrition Characteristic Summary:  Subcutaneous Fat (Malnutrition):  (moderate to severe)  Muscle Mass (Malnutrition):  (moderate to severe)  Hand  Strength, Left (Malnutrition): reduced  Hand  Strength, Right (Malnutrition): reduced      Interventions/Recommendations (treatment strategy):  1.) Recommend continuing Low Na diet, fluid per MD. 2.) Recommend Boost Plus (or Boost equivalent) BID to help optimize PRO/Kcal intake (32% of the FR). 3.) Continue to provide folic acid and thiamine- consider adding MVI. RD to monitor PO intake, skin, labs, wt.    Nutrition Diagnosis Status:   New

## 2024-01-06 NOTE — PROGRESS NOTES
Hepatology Progress Note    Marely Hamilton is a 58 y.o. female admitted to hospital 1/4/2024 (Hospital Day: 3) due to Acute liver failure without hepatic coma.     Interval History  Patient seen this AM - Aox3 but weak. She was eating breakfast without assistance. She was refusing her lactulose and potassium - convinced her to be compliant with her medications.     S/P thoracentesis on 1/5/23 - 650cc fluid removed.   Objective  Temp:  [97.6 °F (36.4 °C)-99.3 °F (37.4 °C)] 97.6 °F (36.4 °C) (01/06 1134)  Pulse:  [70-85] 85 (01/06 1139)  BP: (119-149)/(63-73) 149/70 (01/06 1134)  Resp:  [17-18] 17 (01/06 1134)  SpO2:  [93 %-99 %] 98 % (01/06 1134)    General: Alert, Oriented x3, ill appearing but in no acute distress  Neurologic: Asterixis absent  Abdomen: Non-distended. Normal tympany. Soft. Non-tender. No peritoneal signs.    Laboratory    Lab Results   Component Value Date    WBC 3.42 (L) 01/06/2024    HGB 8.6 (L) 01/06/2024    HCT 25.6 (L) 01/06/2024     (H) 01/06/2024    PLT 42 (L) 01/06/2024       Lab Results   Component Value Date     01/06/2024    K 3.0 (L) 01/06/2024     (H) 01/06/2024    CO2 21 (L) 01/06/2024    BUN 7 01/06/2024    CREATININE 0.3 (L) 01/06/2024    CALCIUM 7.8 (L) 01/06/2024       Lab Results   Component Value Date    ALBUMIN 1.7 (L) 01/06/2024    ALT 34 01/06/2024    AST 82 (H) 01/06/2024    GGT 33 06/17/2023    ALKPHOS 190 (H) 01/06/2024    BILITOT 15.0 (H) 01/06/2024       Lab Results   Component Value Date    INR 1.8 (H) 01/06/2024    INR 1.8 (H) 01/05/2024    INR 1.8 (H) 01/05/2024       MELD 3.0: 28 at 1/6/2024  6:23 AM  MELD-Na: 23 at 1/6/2024  6:23 AM  Calculated from:  Serum Creatinine: 0.3 mg/dL (Using min of 1 mg/dL) at 1/6/2024  6:23 AM  Serum Sodium: 144 mmol/L (Using max of 137 mmol/L) at 1/6/2024  6:23 AM  Total Bilirubin: 15.0 mg/dL at 1/6/2024  6:23 AM  Serum Albumin: 1.7 g/dL at 1/6/2024  6:23 AM  INR(ratio): 1.8 at 1/6/2024  6:23 AM  Age at listing  (hypothetical): 58 years  Sex: Female at 1/6/2024  6:23 AM      Assessment  Marely Hamilton is a 58 y.o. female with history of EtOH cirrhosis, bipolar disorder, DVT with IVC filter in place, prior GI bleeds, ESBL UTI history, and seizure disorder. She presented to the ER from her nursing home for abdominal distension, peripheral edema, and jaundice. Found to have pleural effusion; thora performed. Has had multiple admissions in the past year for HE, UTI, falls, and GI bleeding (EGD on 7/6/23 showed esophageal ulcer, no varices, non-bleeding gastric ulcer and duodenal ulcer with oozing). Extremely frail and ill appearing on exam. Has been declined for a liver transplant twice - for frailty, non-compliance and comorbidities. S/P thoracentesis on 1/5/23 - 650cc fluid removed. No plans for inpatient liver tx eval given frailty, social situation, and medical/psychiatric comorbidities.       Problem List:  Decompensated cirrhosis 2/2 EtOH use (c/b HE and ascites)  Hx of bleeding gastric and duodenal ulcers  EtOH use disorder  Bipolar disorder  Medication non-compliance    Plan  - Consult palliative care for GOC discussion.   - Await results of pleural fluid studies and culture.   - Continue lactulose 15 gm TID & titrate till 3-4 soft BM achieved daily. Continue Xifaxin 550 mg BID.   - Continue home psychiatric medications per psych recs.    - Avoid sedating drugs like opiates and BZDs, if possible.   - Low Na, high protein diet.   - For frailty: Continue PT, OT, and Nutrition.    - please obtain daily CBC, BMP, LFT, INR  - Plan of care was discussed with primary team    Thank you for involving us in the care of Marely Hamilton. Please call with any additional concerns or questions.    Steve Lloyd MD, PGY-V  Gastroenterology Fellow  [facility]

## 2024-01-06 NOTE — PLAN OF CARE
Patient is seen in emergency room after her thoracentesis.  Paracentesis could not be attempted given no discernible pocket.  Patient on 3 L oxygen on nasal cannula.  She is alert and oriented x2.  Denies any acute distress.  Urethral suction catheter in place with good urine output.    Seen by hepatology and discuss patient's care given her frailty and declining status we will not be considered for inpatient transplant.    Cultures for sepsis were obtained and continued on antibiotics.    Appreciate pharmacy medical reconciliation.  Continue psychiatric medications.

## 2024-01-06 NOTE — CONSULTS
[facility] - Transplant Stepdown  Adult Nutrition  Consult Note    SUMMARY     Recommendations    1.) Recommend continuing Low Na diet, fluid per MD.      - may consider to discontinue diet and change to Regular to help increase PO intake    2.) Recommend Boost Plus (or Boost equivalent) BID to help optimize PRO/Kcal intake (32% of the FR).     3.) Continue to provide folic acid and thiamine- consider adding MVI.     4.) RD to monitor PO intake, skin, labs, wt.    Goals: to meet % of EEN/EPN by next RD f/u  Nutrition Goal Status: new  Communication of RD Recs:  (POC)    Assessment and Plan    Endocrine  Severe malnutrition  Malnutrition Type:  Context: chronic illness  Level: severe    Related to (etiology):   ESLD    Signs and Symptoms (as evidenced by):   Moderate to severe muscle/fat wasting, dull and thinning hair, spoon nails, reduced hand      Malnutrition Characteristic Summary:  Subcutaneous Fat (Malnutrition):  (moderate to severe)  Muscle Mass (Malnutrition):  (moderate to severe)  Hand  Strength, Left (Malnutrition): reduced  Hand  Strength, Right (Malnutrition): reduced      Interventions/Recommendations (treatment strategy):  1.) Recommend continuing Low Na diet, fluid per MD. 2.) Recommend Boost Plus (or Boost equivalent) BID to help optimize PRO/Kcal intake (32% of the FR). 3.) Continue to provide folic acid and thiamine- consider adding MVI. RD to monitor PO intake, skin, labs, wt.    Nutrition Diagnosis Status:   New       Malnutrition Assessment  Malnutrition Context: chronic illness  Malnutrition Level: severe  Skin (Micronutrient): yellow  Nails (Micronutrient): nail ends curved, spoon-shaped  Hair/Scalp (Micronutrient): dull, fine, sparse, scaly/flaky   Micronutrient Evaluation Summary: suspected deficiency  Micronutrient Evaluation Comments: B- vitamin deficiency   Subcutaneous Fat (Malnutrition):  (moderate to severe)  Muscle Mass (Malnutrition):  (moderate to severe)  Hand  " Strength, Left (Malnutrition): reduced  Hand  Strength, Right (Malnutrition): reduced   Orbital Region (Subcutaneous Fat Loss): severe depletion  Upper Arm Region (Subcutaneous Fat Loss): severe depletion  Thoracic and Lumbar Region: moderate depletion   Buddhism Region (Muscle Loss): severe depletion  Clavicle Bone Region (Muscle Loss): moderate depletion  Clavicle and Acromion Bone Region (Muscle Loss): moderate depletion  Scapular Bone Region (Muscle Loss): moderate depletion  Dorsal Hand (Muscle Loss): severe depletion     Reason for Assessment    Reason For Assessment: consult  Diagnosis: liver disease (Acute liver failure without hepatic coma)  Relevant Medical History: alcoholic cirrhosis, bipolar disorder, chronic anemia, thrombocytopenia, cholelithiasis, DVT, h/o GI bleeds  Interdisciplinary Rounds: did not attend    General Information Comments: RD consulted for malnutrition. Pt denies n/v/d/c. Pt endorses fair appetite, consuming around 25-50% of the meals provided. Pt states that #, #. Mild edema present. Wt has appeared stable x 12 mo. NFPE performed on 1/6: RD feels pt does meet the criteria of severe malnutrition in the context of chronic illness. Please see PES for details.    Nutrition Discharge Planning: adequate PO intake and ONS of choice    Nutrition Risk Screen    Nutrition Risk Screen: no indicators present    Nutrition/Diet History    Patient Reported Diet/Restrictions/Preferences: general  Typical Food/Fluid Intake: 3 meals per day  Spiritual, Cultural Beliefs, Jewish Practices, Values that Affect Care: no  Supplemental Drinks or Food Habits: Boost Original (x1/day)    Anthropometrics    Temp: 97.6 °F (36.4 °C)  Height: 5' 3" (160 cm)  Height (inches): 63 in  Weight Method: Bed Scale  Weight: 58.1 kg (128 lb 1.4 oz)  Weight (lb): 128.09 lb  Ideal Body Weight (IBW), Female: 115 lb  % Ideal Body Weight, Female (lb): 111.38 %  BMI (Calculated): 22.7  BMI Grade: 18.5-24.9 " - normal    Lab/Procedures/Meds    Pertinent Labs Reviewed: reviewed  Pertinent Labs Comments: K: 3.0, cr: 0.3, gluc: 126, Ca: 7.8, phos: 2.5, ALP: 190, PRO: 4.5, alb: 1.7, tbili: 15.0, AST: 82    Pertinent Medications Reviewed: reviewed  Pertinent Medications Comments: Albumin, abx, folic acid, lactulose, lithium, Mg sulfate, olanzapine, pantoprazole, KCl, thiamine, VitD    Estimated/Assessed Needs    Weight Used For Calorie Calculations: 58.1 kg (128 lb 1.4 oz)  Energy Calorie Requirements (kcal): 1452- 1743 kcal  Energy Need Method: Kcal/kg (25-30 kcal/kg)    Protein Requirements: 70g (1.2g/kg)  Weight Used For Protein Calculations: 58.1 kg (128 lb 1.4 oz)    Fluid Requirements (mL): 1ml/1kcal or per MD  Estimated Fluid Requirement Method: RDA Method  RDA Method (mL): 1452    Nutrition Prescription Ordered    Current Diet Order: Low Na diet (1.5L FR)    Evaluation of Received Nutrient/Fluid Intake    I/O: -410ml since admit  Energy Calories Required: not meeting needs  Protein Required: not meeting needs  Fluid Required:  (as per MD)  Comments: LBM 1/5  Tolerance: tolerating  % Intake of Estimated Energy Needs: 25 - 50 %  % Meal Intake: 25 - 50 %    Nutrition Risk    Level of Risk/Frequency of Follow-up:  (RD to f/u x1/week)     Monitor and Evaluation    Food and Nutrient Intake: energy intake, food and beverage intake  Food and Nutrient Adminstration: diet order  Knowledge/Beliefs/Attitudes: beliefs and attitudes  Physical Activity and Function: nutrition-related ADLs and IADLs  Anthropometric Measurements: weight, weight change, body mass index  Biochemical Data, Medical Tests and Procedures: electrolyte and renal panel, glucose/endocrine profile, inflammatory profile, lipid profile  Nutrition-Focused Physical Findings: overall appearance, skin     Nutrition Follow-Up    RD Follow-up?: Yes

## 2024-01-06 NOTE — NURSING
Pt admitted to room via stretcher aaox4 and vswnl. Pt is bedbound and needs assist to turn. Pt incontinent stool and urine. Incontinence care maintained. Pt skin intact. Bed in low position and callbell within reach. 2liters n/c maintained with sats >95%. Neuro intact just weak hand . Telemetry maintained NSR. Jaundice and ascites noted with trace edema BLE. Fingernails overgrown on fingers.  Left buttock pink blanchable, barrier ointment applied.

## 2024-01-06 NOTE — CONSULTS
Please see consult note dated 1/5/24 by this note writer.     Steve Lloyd MD  PGY-V, Gastroenterology & Hepatology

## 2024-01-06 NOTE — PLAN OF CARE
Recommendations     1.) Recommend continuing Low Na diet, fluid per MD.                  - may consider to discontinue diet and change to Regular to help increase PO intake     2.) Recommend Boost Plus (or Boost equivalent) BID to help optimize PRO/Kcal intake (32% of the FR).      3.) Continue to provide folic acid and thiamine- consider adding MVI.      4.) RD to monitor PO intake, skin, labs, wt.     Goals: to meet % of EEN/EPN by next RD f/u  Nutrition Goal Status: new  Communication of RD Recs:  (POC)

## 2024-01-07 PROBLEM — E87.6 HYPOKALEMIA: Status: RESOLVED | Noted: 2021-11-03 | Resolved: 2024-01-07

## 2024-01-07 LAB
ALBUMIN SERPL BCP-MCNC: 2.3 G/DL (ref 3.5–5.2)
ALP SERPL-CCNC: 196 U/L (ref 55–135)
ALT SERPL W/O P-5'-P-CCNC: 33 U/L (ref 10–44)
ANION GAP SERPL CALC-SCNC: 5 MMOL/L (ref 8–16)
APTT PPP: 33.6 SEC (ref 21–32)
AST SERPL-CCNC: 71 U/L (ref 10–40)
BACTERIA UR CULT: NORMAL
BACTERIA UR CULT: NORMAL
BASOPHILS # BLD AUTO: 0.01 K/UL (ref 0–0.2)
BASOPHILS NFR BLD: 0.2 % (ref 0–1.9)
BILIRUB SERPL-MCNC: 18.4 MG/DL (ref 0.1–1)
BUN SERPL-MCNC: 7 MG/DL (ref 6–20)
CALCIUM SERPL-MCNC: 8.6 MG/DL (ref 8.7–10.5)
CHLORIDE SERPL-SCNC: 116 MMOL/L (ref 95–110)
CO2 SERPL-SCNC: 23 MMOL/L (ref 23–29)
CREAT SERPL-MCNC: 0.4 MG/DL (ref 0.5–1.4)
DIFFERENTIAL METHOD BLD: ABNORMAL
EOSINOPHIL # BLD AUTO: 0.1 K/UL (ref 0–0.5)
EOSINOPHIL NFR BLD: 2.7 % (ref 0–8)
ERYTHROCYTE [DISTWIDTH] IN BLOOD BY AUTOMATED COUNT: 16.3 % (ref 11.5–14.5)
EST. GFR  (NO RACE VARIABLE): >60 ML/MIN/1.73 M^2
FOLATE SERPL-MCNC: 23.8 NG/ML (ref 4–24)
GLUCOSE SERPL-MCNC: 135 MG/DL (ref 70–110)
HCT VFR BLD AUTO: 27.5 % (ref 37–48.5)
HGB BLD-MCNC: 9.1 G/DL (ref 12–16)
IMM GRANULOCYTES # BLD AUTO: 0.05 K/UL (ref 0–0.04)
IMM GRANULOCYTES NFR BLD AUTO: 1.1 % (ref 0–0.5)
INR PPP: 1.8 (ref 0.8–1.2)
LYMPHOCYTES # BLD AUTO: 0.3 K/UL (ref 1–4.8)
LYMPHOCYTES NFR BLD: 6.7 % (ref 18–48)
MAGNESIUM SERPL-MCNC: 2 MG/DL (ref 1.6–2.6)
MCH RBC QN AUTO: 37.8 PG (ref 27–31)
MCHC RBC AUTO-ENTMCNC: 33.1 G/DL (ref 32–36)
MCV RBC AUTO: 114 FL (ref 82–98)
MONOCYTES # BLD AUTO: 0.4 K/UL (ref 0.3–1)
MONOCYTES NFR BLD: 8.3 % (ref 4–15)
NEUTROPHILS # BLD AUTO: 3.6 K/UL (ref 1.8–7.7)
NEUTROPHILS NFR BLD: 81 % (ref 38–73)
NRBC BLD-RTO: 0 /100 WBC
PHOSPHATE SERPL-MCNC: 1.7 MG/DL (ref 2.7–4.5)
PLATELET # BLD AUTO: 45 K/UL (ref 150–450)
PMV BLD AUTO: 10.1 FL (ref 9.2–12.9)
POCT GLUCOSE: 121 MG/DL (ref 70–110)
POCT GLUCOSE: 132 MG/DL (ref 70–110)
POCT GLUCOSE: 139 MG/DL (ref 70–110)
POCT GLUCOSE: 98 MG/DL (ref 70–110)
POTASSIUM SERPL-SCNC: 4.4 MMOL/L (ref 3.5–5.1)
PROT SERPL-MCNC: 5.2 G/DL (ref 6–8.4)
PROTHROMBIN TIME: 19.1 SEC (ref 9–12.5)
RBC # BLD AUTO: 2.41 M/UL (ref 4–5.4)
SODIUM SERPL-SCNC: 144 MMOL/L (ref 136–145)
VIT B12 SERPL-MCNC: 1345 PG/ML (ref 210–950)
WBC # BLD AUTO: 4.48 K/UL (ref 3.9–12.7)

## 2024-01-07 PROCEDURE — 25000003 PHARM REV CODE 250: Performed by: STUDENT IN AN ORGANIZED HEALTH CARE EDUCATION/TRAINING PROGRAM

## 2024-01-07 PROCEDURE — 85610 PROTHROMBIN TIME: CPT | Performed by: STUDENT IN AN ORGANIZED HEALTH CARE EDUCATION/TRAINING PROGRAM

## 2024-01-07 PROCEDURE — 36415 COLL VENOUS BLD VENIPUNCTURE: CPT | Performed by: STUDENT IN AN ORGANIZED HEALTH CARE EDUCATION/TRAINING PROGRAM

## 2024-01-07 PROCEDURE — 82607 VITAMIN B-12: CPT | Performed by: STUDENT IN AN ORGANIZED HEALTH CARE EDUCATION/TRAINING PROGRAM

## 2024-01-07 PROCEDURE — 97165 OT EVAL LOW COMPLEX 30 MIN: CPT

## 2024-01-07 PROCEDURE — 97161 PT EVAL LOW COMPLEX 20 MIN: CPT

## 2024-01-07 PROCEDURE — 82746 ASSAY OF FOLIC ACID SERUM: CPT | Performed by: STUDENT IN AN ORGANIZED HEALTH CARE EDUCATION/TRAINING PROGRAM

## 2024-01-07 PROCEDURE — 36415 COLL VENOUS BLD VENIPUNCTURE: CPT | Mod: XB | Performed by: STUDENT IN AN ORGANIZED HEALTH CARE EDUCATION/TRAINING PROGRAM

## 2024-01-07 PROCEDURE — 97535 SELF CARE MNGMENT TRAINING: CPT

## 2024-01-07 PROCEDURE — 85025 COMPLETE CBC W/AUTO DIFF WBC: CPT | Performed by: STUDENT IN AN ORGANIZED HEALTH CARE EDUCATION/TRAINING PROGRAM

## 2024-01-07 PROCEDURE — 97530 THERAPEUTIC ACTIVITIES: CPT

## 2024-01-07 PROCEDURE — 84100 ASSAY OF PHOSPHORUS: CPT | Performed by: STUDENT IN AN ORGANIZED HEALTH CARE EDUCATION/TRAINING PROGRAM

## 2024-01-07 PROCEDURE — 85730 THROMBOPLASTIN TIME PARTIAL: CPT | Performed by: STUDENT IN AN ORGANIZED HEALTH CARE EDUCATION/TRAINING PROGRAM

## 2024-01-07 PROCEDURE — 83735 ASSAY OF MAGNESIUM: CPT | Performed by: STUDENT IN AN ORGANIZED HEALTH CARE EDUCATION/TRAINING PROGRAM

## 2024-01-07 PROCEDURE — 80053 COMPREHEN METABOLIC PANEL: CPT | Performed by: STUDENT IN AN ORGANIZED HEALTH CARE EDUCATION/TRAINING PROGRAM

## 2024-01-07 PROCEDURE — 63600175 PHARM REV CODE 636 W HCPCS: Performed by: STUDENT IN AN ORGANIZED HEALTH CARE EDUCATION/TRAINING PROGRAM

## 2024-01-07 PROCEDURE — 20600001 HC STEP DOWN PRIVATE ROOM

## 2024-01-07 RX ORDER — SODIUM,POTASSIUM PHOSPHATES 280-250MG
1 POWDER IN PACKET (EA) ORAL ONCE
Status: COMPLETED | OUTPATIENT
Start: 2024-01-07 | End: 2024-01-07

## 2024-01-07 RX ORDER — SODIUM CHLORIDE, SODIUM LACTATE, POTASSIUM CHLORIDE, CALCIUM CHLORIDE 600; 310; 30; 20 MG/100ML; MG/100ML; MG/100ML; MG/100ML
INJECTION, SOLUTION INTRAVENOUS CONTINUOUS
Status: ACTIVE | OUTPATIENT
Start: 2024-01-07 | End: 2024-01-08

## 2024-01-07 RX ADMIN — CEFTRIAXONE 2 G: 2 INJECTION, POWDER, FOR SOLUTION INTRAMUSCULAR; INTRAVENOUS at 05:01

## 2024-01-07 RX ADMIN — SODIUM CHLORIDE, POTASSIUM CHLORIDE, SODIUM LACTATE AND CALCIUM CHLORIDE: 600; 310; 30; 20 INJECTION, SOLUTION INTRAVENOUS at 04:01

## 2024-01-07 RX ADMIN — LEVETIRACETAM 1000 MG: 100 SOLUTION ORAL at 10:01

## 2024-01-07 RX ADMIN — RIFAXIMIN 550 MG: 550 TABLET ORAL at 10:01

## 2024-01-07 RX ADMIN — CHOLECALCIFEROL TAB 25 MCG (1000 UNIT) 2000 UNITS: 25 TAB at 08:01

## 2024-01-07 RX ADMIN — PANTOPRAZOLE SODIUM 40 MG: 40 GRANULE, DELAYED RELEASE ORAL at 10:01

## 2024-01-07 RX ADMIN — LEVETIRACETAM 1000 MG: 100 SOLUTION ORAL at 08:01

## 2024-01-07 RX ADMIN — LACTULOSE 30 G: 20 SOLUTION ORAL at 10:01

## 2024-01-07 RX ADMIN — POTASSIUM & SODIUM PHOSPHATES POWDER PACK 280-160-250 MG 1 PACKET: 280-160-250 PACK at 12:01

## 2024-01-07 RX ADMIN — PANTOPRAZOLE SODIUM 40 MG: 40 GRANULE, DELAYED RELEASE ORAL at 08:01

## 2024-01-07 RX ADMIN — LACTULOSE 30 G: 20 SOLUTION ORAL at 08:01

## 2024-01-07 RX ADMIN — RIFAXIMIN 550 MG: 550 TABLET ORAL at 08:01

## 2024-01-07 RX ADMIN — LACTULOSE 30 G: 20 SOLUTION ORAL at 02:01

## 2024-01-07 RX ADMIN — OLANZAPINE 5 MG: 5 TABLET, FILM COATED ORAL at 10:01

## 2024-01-07 RX ADMIN — FOLIC ACID 1 MG: 1 TABLET ORAL at 08:01

## 2024-01-07 RX ADMIN — THERA TABS 1 TABLET: TAB at 08:01

## 2024-01-07 RX ADMIN — LITHIUM CARBONATE 300 MG: 300 TABLET, FILM COATED, EXTENDED RELEASE ORAL at 10:01

## 2024-01-07 RX ADMIN — Medication 100 MG: at 08:01

## 2024-01-07 NOTE — ASSESSMENT & PLAN NOTE
Patient has hypokalemia which is Acute on Chronic and currently controlled. Most recent potassium levels reviewed-   Lab Results   Component Value Date    K 4.4 01/07/2024   . Will continue potassium replacement per protocol and recheck repeat levels after replacement completed.

## 2024-01-07 NOTE — PLAN OF CARE
Pt engaged fairly in therapy evaluation, required assistance throughout session to participate.     Problem: Occupational Therapy  Goal: Occupational Therapy Goal  Description: Goals to be met by: 2/7/24     Patient will increase functional independence with ADLs by performing:    UE Dressing with Moderate Assistance.  LE Dressing with Maximum Assistance.  Grooming while EOB with Moderate Assistance.  Toileting from bedside commode with Maximum Assistance for hygiene and clothing management.   Rolling to Bilateral with Minimal Assistance.   Supine to sit with Moderate Assistance.  Step transfer with Maximum Assistance  Toilet transfer to bedside commode with Maximum Assistance.    Outcome: Ongoing, Progressing

## 2024-01-07 NOTE — PT/OT/SLP EVAL
Occupational Therapy  Co -  Evaluation and Co - Treatment    Co-evaluation/treatment performed due to patient's multiple deficits requiring two skilled therapists to appropriately and safely assess patient's strength and endurance while facilitating functional tasks in addition to accommodating for patient's activity tolerance.       Name: Marely Hamilton  MRN: [id number]  Admitting Diagnosis: Decompensated hepatic cirrhosis  Recent Surgery: * No surgery found *      Recommendations:     Discharge Recommendations: Low Intensity Therapy  Discharge Equipment Recommendations:  none  Barriers to discharge:   increased skilled A needed    Assessment:     Marely Hamilton is a 58 y.o. female with a medical diagnosis of Decompensated hepatic cirrhosis.  She presents with performance deficits affecting function: weakness, impaired self care skills, impaired balance, impaired functional mobility, impaired cognition, decreased coordination, decreased upper extremity function, decreased lower extremity function, pain, decreased safety awareness, impaired fine motor, impaired skin, impaired cardiopulmonary response to activity, impaired coordination, decreased ROM.  Pt engaged fairly in therapy session this date, limited by pain, fatigue, and weakness.  Pt encountered soiled and required brijesh-care prior to sitting EOB. While sitting EOB, pt required significant assistance to sit upright, as she preferred to lean to the R side and return to supine. Patient currently demonstrates a need for low intensity therapy on a scheduled basis secondary to a decline in functional status due to illness.      Rehab Prognosis: Fair; patient would benefit from acute skilled OT services to address these deficits and reach maximum level of function.       Plan:     Patient to be seen 3 x/week to address the above listed problems via self-care/home management, therapeutic activities, therapeutic exercises  Plan of Care Expires: 02/07/24  Plan of Care  "Reviewed with:      Subjective     Chief Complaint: "I don't know if I can get up"   Patient/Family Comments/goals: Get better, return home     Occupational Profile:  Living Environment: Pt lives at [facility]  Previous level of function: requires A with ADLs/mobility. Uses wc for mobility  Equipment Used at Home: wheelchair, walker, rolling  Assistance upon Discharge: NH staff    Pain/Comfort:  Pain Rating 1:  not rated but groaned with movement  Pain Addressed 1: Reposition, Distraction  Pain Rating Post-Intervention 1:  not rated    Patients cultural, spiritual, Muslim conflicts given the current situation: no    Objective:     Communicated with: RN prior to session.  Patient found supine with telemetry, pulse ox (continuous), peripheral IV upon OT entry to room.    General Precautions: Standard, fall  Orthopedic Precautions: N/A  Braces: N/A  Respiratory Status: Room air    Occupational Performance:    Bed Mobility:    Patient completed Rolling/Turning to Left with  total assistance  Patient completed Rolling/Turning to Right with total assistance  Patient completed Scooting/Bridging with dependent  Patient completed Supine to Sit with total assistance  Patient completed Sit to Supine with total assistance  Pt sat EOB ~6min requiring max A to sit upright as pt attempted to return to bed     Functional Mobility/Transfers:  Did not complete 2* safety    Activities of Daily Living:  Upper Body Dressing: total assistance doffing soiled gown and donning fresh gown  Toileting: total assistance completing posterior/anterior brijesh care    Cognitive/Visual Perceptual:  Cognitive/Psychosocial Skills:     -       Oriented to: AO x self   -       Follows Commands/attention:Follows one-step commands  -       Communication: soft voice/mumbled  -       Memory: Poor immediate recall  -       Safety awareness/insight to disability: impaired   -       Mood/Affect/Coping skills/emotional control: Appropriate to " situation  Visual/Perceptual:      -Intact      Physical Exam:  Balance:    -       Sitting EOB: Impaired  Postural examination/scapula alignment:    -       Rounded shoulders  Skin integrity: jaundiced  Edema:  None noted  Sensation:    -       Intact  Motor Planning:    -       Impaired  Dominant hand:    -       Right  Neurological: Impaired    AMPAC 6 Click ADL:  AMPAC Total Score: 12    Treatment & Education:  Pt educated on role of OT, POC, and goals for therapy.    POC was dicussed with patient/caregiver, who was included in its development and is in agreement with the identified goals and treatment plan.   Patient and family aware of patient's deficits and therapy progression.   Time provided for therapeutic counseling and discussion of health disposition.   Educated on importance of EOB/OOB mobility, maintaining routine, sitting up in chair, and maximizing independence with ADLs during admission   Pt completed ADLs and functional mobility for treatment session as noted above   Pt/caregiver verbalized understanding and expressed no further concerns/questions.  Updated communication board with level of assist required       Patient left supine with all lines intact, call button in reach, bed alarm on, and RN notified    GOALS:   Multidisciplinary Problems       Occupational Therapy Goals          Problem: Occupational Therapy    Goal Priority Disciplines Outcome Interventions   Occupational Therapy Goal     OT, PT/OT Ongoing, Progressing    Description: Goals to be met by: 2/7/24     Patient will increase functional independence with ADLs by performing:    UE Dressing with Moderate Assistance.  LE Dressing with Maximum Assistance.  Grooming while EOB with Moderate Assistance.  Toileting from bedside commode with Maximum Assistance for hygiene and clothing management.   Rolling to Bilateral with Minimal Assistance.   Supine to sit with Moderate Assistance.  Step transfer with Maximum Assistance  Toilet  transfer to bedside commode with Maximum Assistance.                         History:     Past Medical History:   Diagnosis Date    Addiction to drug     Alcohol abuse     Alcohol abuse, in remission 6/15/2015    14.5 weeks ago; AA weekly    Anemia     Anxiety 6/15/2015    Behavioral problem     Bipolar disorder     Bipolar disorder in remission 6/15/2015    Cirrhosis, Laennec's 6/15/2015    Depression     Encounter for blood transfusion     Epistaxis 6/15/2015    Fatigue     Glaucoma     Hematuria     Hepatic encephalopathy 6/15/2015    Hepatic enlargement     History of psychiatric care     History of psychiatric hospitalization     History of seizure 6/15/2015    1    hx of intentional Tylenol overdose 6/15/2015    2005- situational and hx of bipolar    Hx of psychiatric care     Macrocytic anemia 9/18/2015    6 units PRBC since June 2015    [facility]ana     Osteoarthritis     Other ascites 6/15/2015    Psychiatric exam requested by authority     Psychiatric problem     Psychosis 9/26/2019    Renal disorder     Seizures     Self-harming behavior     Suicide attempt     Therapy     Thrombocytopenia 6/15/2015         Past Surgical History:   Procedure Laterality Date    COSMETIC SURGERY      EMBOLIZATION N/A 6/11/2023    Procedure: EMBOLIZATION, BLOOD VESSEL;  Surgeon: Debbie Surgeon;  Location: [facility];  Service: Anesthesiology;  Laterality: N/A;    ESOPHAGOGASTRODUODENOSCOPY      ESOPHAGOGASTRODUODENOSCOPY N/A 7/6/2023    Procedure: EGD (ESOPHAGOGASTRODUODENOSCOPY);  Surgeon: Bernice Guillen MD;  Location: [address]);  Service: Endoscopy;  Laterality: N/A;    ESOPHAGOGASTRODUODENOSCOPY N/A 7/11/2023    Procedure: EGD (ESOPHAGOGASTRODUODENOSCOPY);  Surgeon: Rangel Broussard MD;  Location: [address]);  Service: Endoscopy;  Laterality: N/A;       Time Tracking:     OT Date of Treatment: 01/07/24  OT Start Time: 0820  OT Stop Time: 0836  OT Total Time (min): 16 min    Billable Minutes:Evaluation 8  Self  Care/Home Management 8    1/7/2024

## 2024-01-07 NOTE — ASSESSMENT & PLAN NOTE
Patient with known Cirrhosis with Child's class C. Co-morbidities are present and inclusive of ascites, malnutrition, anemia/pancytopenia, and Pleural effusion .  MELD-Na score calculated; MELD 3.0: 28 at 1/6/2024  6:23 AM  MELD-Na: 23 at 1/6/2024  6:23 AM  Calculated from:  Serum Creatinine: 0.3 mg/dL (Using min of 1 mg/dL) at 1/6/2024  6:23 AM  Serum Sodium: 144 mmol/L (Using max of 137 mmol/L) at 1/6/2024  6:23 AM  Total Bilirubin: 15.0 mg/dL at 1/6/2024  6:23 AM  Serum Albumin: 1.7 g/dL at 1/6/2024  6:23 AM  INR(ratio): 1.8 at 1/6/2024  6:23 AM  Age at listing (hypothetical): 58 years  Sex: Female at 1/6/2024  6:23 AM      Continue chronic meds. Etiology likely ETOH. Will avoid any hepatotoxic meds, and monitor CBC/CMP/INR for synthetic function.     Presenting with decompensation (increased ascites, Bili 12, ammonia 114).  Status post thoracentesis of 650 mL.  Awaiting cultures.  Could not find a pocket for paracentesis.  Continue Rocephin for now  -restart lactulose as she has been noncompliant  Appreciate hepatology recommendations.  Recommendations to include physical therapy occupational therapy, palliative care discussions for goals of care.  She has not a candidate for transplantation given frailty, poor social support and noncompliance with medication

## 2024-01-07 NOTE — ASSESSMENT & PLAN NOTE
Patient has hypokalemia which is Acute on Chronic and currently controlled. Most recent potassium levels reviewed-   Lab Results   Component Value Date    K 3.0 (L) 01/06/2024   . Will continue potassium replacement per protocol and recheck repeat levels after replacement completed.

## 2024-01-07 NOTE — ASSESSMENT & PLAN NOTE
Patient with known Cirrhosis with Child's class C. Co-morbidities are present and inclusive of ascites, malnutrition, anemia/pancytopenia, and Pleural effusion .  MELD-Na score calculated; MELD 3.0: 28 at 1/7/2024  6:35 AM  MELD-Na: 24 at 1/7/2024  6:35 AM  Calculated from:  Serum Creatinine: 0.4 mg/dL (Using min of 1 mg/dL) at 1/7/2024  6:35 AM  Serum Sodium: 144 mmol/L (Using max of 137 mmol/L) at 1/7/2024  6:35 AM  Total Bilirubin: 18.4 mg/dL at 1/7/2024  6:35 AM  Serum Albumin: 2.3 g/dL at 1/7/2024  6:35 AM  INR(ratio): 1.8 at 1/7/2024  6:35 AM  Age at listing (hypothetical): 58 years  Sex: Female at 1/7/2024  6:35 AM      Continue chronic meds. Etiology likely ETOH. Will avoid any hepatotoxic meds, and monitor CBC/CMP/INR for synthetic function.     Presenting with decompensation (increased ascites, Bili 12, ammonia 114).  Status post thoracentesis of 650 mL.  Awaiting cultures.  Could not find a pocket for paracentesis.  Continue Rocephin for now  -restart lactulose as she has been noncompliant  Appreciate hepatology recommendations.  Recommendations to include physical therapy occupational therapy, palliative care discussions for goals of care.  She has not a candidate for transplantation given frailty, poor social support and noncompliance with medication

## 2024-01-07 NOTE — PROGRESS NOTES
[facility] - Transplant [facility] Medicine  Progress Note    Patient Name: Marely Hamilton  MRN: [id number]  Patient Class: IP- Inpatient   Admission Date: 1/4/2024  Length of Stay: 1 days  Attending Physician: Kaylee Chavez MD  Primary Care Provider: Viktor Ross MD        Subjective:     Principal Problem:Decompensated hepatic cirrhosis        HPI:  Patient is a 58-year-old woman with past medical history significant for alcoholic cirrhosis, bipolar disorder, chronic anemia, thrombocytopenia, cholelithiasis, DVT with IVC filter in place, prior GI bleeds, ESBL UTI history, seizure disorder presenting from nursing [facility] for evaluation of abdominal distension, peripheral edema, and jaundice.     Patient denies fever, chills, or abdominal pain. Patient was recently admitted in early December for hepatic encephalopathy.   Patient denies any pain at this time. She reports she has been urinating frequently though difficult to obtain history.   She denies recent illness/fever/chills/nausea/vomiting/diarrhea/constipation.  Patient has not been taking lactulose. Reports compliance with psychiatric medications and A&Ox3 but reports she can't take the lactulose due to not tolerating the sun.    Hospital Course:   In the ED, vitals stable but hypoxic on room air to the 80s, which improved with supplemental oxygen.  Presentation consistent with decompensated liver failure, meld score 22.    She was sent in by her nursing home facility for mild abdominal distention as well as significant jaundice.    Chest x-ray revealed a moderate-to-large pleural effusion with compressive atelectasis. Likely from her ascites. Her abdomen is soft, nontender.      Intake labs remarkable for Na 142, BUN 6, Cr 0.4, Calcium 7.8, , Tbili 12.1, , ALT 41, Ammonia 114, lactic 1.0.     Will admit for decompensated cirrhosis. Continue on lactulose for treatment of HE, rocephin for SBP ppx. Hepatology and IR consulted for  thoracentesis and further recommendations.   Patient is oriented to person, place, time, and situation but with some delusions.     Overview/Hospital Course:  No notes on file    Interval History:   Mentation improved from yesterday.  Oxygen requirements decreased to 2 L after thoracentesis.  Has been incontinent to stool and urine.  Overall she is alert and oriented x2.  Discussed if okay with palliative care consultation and clarified goals.  She herself thinks she is only okay with invasive lines and pressor support however is not of the favor to have chest compressions, intubation or defibrillation in case of emergency however she will discuss with the sister and is okay to discuss with palliative.    Review of Systems   Constitutional:  Negative for activity change, chills and fever.   HENT:  Negative for congestion.    Eyes:  Negative for visual disturbance.   Respiratory:  Positive for shortness of breath. Negative for cough and chest tightness.    Cardiovascular:  Negative for chest pain, palpitations and leg swelling.   Gastrointestinal:  Positive for abdominal distention. Negative for abdominal pain, blood in stool, constipation, diarrhea, nausea and vomiting.   Genitourinary:  Negative for dysuria, frequency, hematuria and urgency.   Musculoskeletal:  Negative for arthralgias, back pain, gait problem and neck pain.   Skin:  Positive for color change. Negative for rash and wound.   Neurological:  Positive for tremors. Negative for dizziness, seizures, syncope, facial asymmetry, speech difficulty and light-headedness.   Psychiatric/Behavioral:  Negative for agitation and confusion. The patient is not nervous/anxious.      Objective:     Vital Signs (Most Recent):  Temp: 96.3 °F (35.7 °C) (01/06/24 1601)  Pulse: 91 (01/06/24 1601)  Resp: 19 (01/06/24 1601)  BP: (!) 155/73 (01/06/24 1601)  SpO2: 97 % (01/06/24 1601) Vital Signs (24h Range):  Temp:  [96.3 °F (35.7 °C)-98.8 °F (37.1 °C)] 96.3 °F (35.7  °C)  Pulse:  [70-93] 91  Resp:  [17-19] 19  SpO2:  [93 %-98 %] 97 %  BP: (119-155)/(63-73) 155/73     Body mass index is 22.69 kg/m².    Physical Exam  Vitals reviewed.   Constitutional:       General: She is not in acute distress.     Appearance: She is cachectic. She is ill-appearing.   HENT:      Head: Normocephalic and atraumatic.      Mouth/Throat:      Mouth: Mucous membranes are dry.      Pharynx: No oropharyngeal exudate.   Eyes:      General: Scleral icterus present.      Extraocular Movements: Extraocular movements intact.      Conjunctiva/sclera: Conjunctivae normal.   Cardiovascular:      Rate and Rhythm: Normal rate and regular rhythm.      Heart sounds: Normal heart sounds. No murmur heard.  Pulmonary:      Effort: Pulmonary effort is normal. No respiratory distress.      Breath sounds: No wheezing or rales.   Abdominal:      General: Bowel sounds are normal. There is distension.      Palpations: Abdomen is soft.      Tenderness: There is no abdominal tenderness. There is no guarding.   Musculoskeletal:         General: No tenderness. Normal range of motion.      Cervical back: Normal range of motion and neck supple.   Lymphadenopathy:      Cervical: No cervical adenopathy.   Skin:     General: Skin is warm and dry.      Capillary Refill: Capillary refill takes less than 2 seconds.      Coloration: Skin is jaundiced.      Findings: No rash.   Neurological:      General: No focal deficit present.      Mental Status: She is alert and oriented to person, place, and time.      Motor: Weakness (bilateral lower extremities) present.      Comments: Poor participation   Psychiatric:         Behavior: Behavior is cooperative.         Thought Content: Thought content normal.         Judgment: Judgment normal.        Significant Labs: All pertinent labs within the past 24 hours have been reviewed.  Significant Imaging: I have reviewed all pertinent imaging results/findings within the past 24  hours.      Assessment/Plan:      * Decompensated hepatic cirrhosis  Patient with known Cirrhosis with Child's class C. Co-morbidities are present and inclusive of ascites, malnutrition, anemia/pancytopenia, and Pleural effusion .  MELD-Na score calculated; MELD 3.0: 28 at 1/6/2024  6:23 AM  MELD-Na: 23 at 1/6/2024  6:23 AM  Calculated from:  Serum Creatinine: 0.3 mg/dL (Using min of 1 mg/dL) at 1/6/2024  6:23 AM  Serum Sodium: 144 mmol/L (Using max of 137 mmol/L) at 1/6/2024  6:23 AM  Total Bilirubin: 15.0 mg/dL at 1/6/2024  6:23 AM  Serum Albumin: 1.7 g/dL at 1/6/2024  6:23 AM  INR(ratio): 1.8 at 1/6/2024  6:23 AM  Age at listing (hypothetical): 58 years  Sex: Female at 1/6/2024  6:23 AM      Continue chronic meds. Etiology likely ETOH. Will avoid any hepatotoxic meds, and monitor CBC/CMP/INR for synthetic function.     Presenting with decompensation (increased ascites, Bili 12, ammonia 114).  Status post thoracentesis of 650 mL.  Awaiting cultures.  Could not find a pocket for paracentesis.  Continue Rocephin for now  -restart lactulose as she has been noncompliant  Appreciate hepatology recommendations.  Recommendations to include physical therapy occupational therapy, palliative care discussions for goals of care.  She has not a candidate for transplantation given frailty, poor social support and noncompliance with medication    Acute hypoxic respiratory failure  Patient with Hypoxic Respiratory failure which is Acute on chronic.  she is not on home oxygen. Supplemental oxygen was provided and noted improvement in her O2 sats.  -chest x-ray with right-sided pleural effusion.  Status post thoracentesis and 650 mL of fluid drained.  Awaiting cultures.  Oxygen requirements decreased to 2 L at this time.  Continue to monitor    Presence of IVC filter  Chronic IVC filter as she is at risk for bleeding and had history of DVTs      Hypokalemia  Patient has hypokalemia which is Acute on Chronic and currently  controlled. Most recent potassium levels reviewed-   Lab Results   Component Value Date    K 3.0 (L) 01/06/2024   . Will continue potassium replacement per protocol and recheck repeat levels after replacement completed.     Bipolar 1 disorder, depressed, severe  Continue home regimen: lithium, olanzapine, keppra.  -lithium level pending  -A&Ox3 but some delusions when asked why she's not on her lactulose.      Severe malnutrition  Nutrition consulted. Most recent weight and BMI monitored-     Measurements:  Wt Readings from Last 1 Encounters:   01/06/24 58.1 kg (128 lb 1.4 oz)   Body mass index is 22.69 kg/m².    Patient has been screened and assessed by RD.    Malnutrition Type:  Context: chronic illness  Level: severe    Malnutrition Characteristic Summary:  Subcutaneous Fat (Malnutrition):  (moderate to severe)  Muscle Mass (Malnutrition):  (moderate to severe)  Hand  Strength, Left (Malnutrition): reduced  Hand  Strength, Right (Malnutrition): reduced    Interventions/Recommendations (treatment strategy):  1.) Recommend continuing Low Na diet, fluid per MD. 2.) Recommend Boost Plus (or Boost equivalent) BID to help optimize PRO/Kcal intake (32% of the FR). 3.) Continue to provide folic acid and thiamine- consider adding MVI. RD to monitor PO intake, skin, labs, wt.      Macrocytic anemia  Patient's anemia is currently controlled. Has not received any PRBCs to date. Etiology likely d/t chronic disease due to Chronic liver disease  Current CBC reviewed-   Lab Results   Component Value Date    HGB 8.6 (L) 01/06/2024    HCT 25.6 (L) 01/06/2024     Monitor serial CBC and transfuse if patient becomes hemodynamically unstable, symptomatic or H/H drops below 7.    She is on chronic thiamine and folate.  We will add multivitamin    Ascites  Mild ascites seen on imaging however no pocket seen on attempt for paracentesis.        VTE Risk Mitigation (From admission, onward)           Ordered     Reason for No  Pharmacological VTE Prophylaxis  Once        Question:  Reasons:  Answer:  Risk of Bleeding    01/05/24 0317     IP VTE HIGH RISK PATIENT  Once         01/05/24 0317     Place sequential compression device  Until discontinued         01/05/24 0317                    Discharge Planning   BRAYAN: 1/9/2024     Code Status: Full Code   Is the patient medically ready for discharge?:     Reason for patient still in hospital (select all that apply): Patient trending condition, Treatment, Consult recommendations, and PT / OT recommendations  Discharge Plan A: Return to nursing home                  Kaylee Chavez MD  Department of Hospital Medicine   [facility] - Transplant Stepdown

## 2024-01-07 NOTE — SUBJECTIVE & OBJECTIVE
Interval History:   Alert however more disoriented today only oriented to self. Oxygen requirements decreased to 2 L after thoracentesis.  Has been incontinent to stool and urine.      She is pleasantly disoriented and talks about calculator and going out to the office and she is currently at the office and overall has delusions/hallucinations.  Confirmed with ancillary staff and they also confirmed that she has this paranoia and does not trust or eat any food here and ask and refuses medications and has to ask where it came from etc. she is on multiple psychiatric medications and has a history.  We will check with Psychiatry.    Attempted to call the sister however could not reach we will discuss again.      Per goals of care discussion yesterday with patient when she was oriented x2,  She herself thinks she is only okay with invasive lines and pressor support however is not of the favor to have chest compressions, intubation or defibrillation in case of emergency however she will discuss with the sister and is okay to discuss with palliative.  When discussed with sister she had said that she will respect her wishes however she would like to talk to her.    Review of Systems   Constitutional:  Negative for activity change, chills and fever.   HENT:  Negative for congestion.    Eyes:  Negative for visual disturbance.   Respiratory:  Positive for shortness of breath. Negative for cough and chest tightness.    Cardiovascular:  Negative for chest pain, palpitations and leg swelling.   Gastrointestinal:  Positive for abdominal distention. Negative for abdominal pain, blood in stool, constipation, diarrhea, nausea and vomiting.   Genitourinary:  Negative for dysuria, frequency, hematuria and urgency.   Musculoskeletal:  Negative for arthralgias, back pain, gait problem and neck pain.   Skin:  Positive for color change. Negative for rash and wound.   Neurological:  Positive for tremors. Negative for dizziness, seizures,  syncope, facial asymmetry, speech difficulty and light-headedness.   Psychiatric/Behavioral:  Positive for hallucinations. Negative for agitation and confusion. The patient is not nervous/anxious.      Objective:     Vital Signs (Most Recent):  Temp: 97.2 °F (36.2 °C) (01/07/24 1210)  Pulse: 94 (01/07/24 1501)  Resp: 20 (01/07/24 1210)  BP: (!) 156/72 (01/07/24 1210)  SpO2: 97 % (01/07/24 1210) Vital Signs (24h Range):  Temp:  [97.2 °F (36.2 °C)-98.5 °F (36.9 °C)] 97.2 °F (36.2 °C)  Pulse:  [86-98] 94  Resp:  [16-20] 20  SpO2:  [95 %-97 %] 97 %  BP: (128-156)/(69-74) 156/72     Body mass index is 22.69 kg/m².    Physical Exam  Vitals reviewed.   Constitutional:       General: She is not in acute distress.     Appearance: She is cachectic. She is ill-appearing.   HENT:      Head: Normocephalic and atraumatic.      Mouth/Throat:      Mouth: Mucous membranes are dry.      Pharynx: No oropharyngeal exudate.   Eyes:      General: Scleral icterus present.      Extraocular Movements: Extraocular movements intact.      Conjunctiva/sclera: Conjunctivae normal.   Cardiovascular:      Rate and Rhythm: Normal rate and regular rhythm.      Heart sounds: Normal heart sounds. No murmur heard.  Pulmonary:      Effort: Pulmonary effort is normal. No respiratory distress.      Breath sounds: No wheezing or rales.   Abdominal:      General: Bowel sounds are normal. There is distension.      Palpations: Abdomen is soft.      Tenderness: There is no abdominal tenderness. There is no guarding.   Musculoskeletal:         General: No tenderness. Normal range of motion.      Cervical back: Normal range of motion and neck supple.   Lymphadenopathy:      Cervical: No cervical adenopathy.   Skin:     General: Skin is warm and dry.      Capillary Refill: Capillary refill takes less than 2 seconds.      Coloration: Skin is jaundiced.      Findings: No rash.   Neurological:      Mental Status: She is alert. She is disoriented.      Motor:  Weakness: bilateral lower extremities.      Comments: Poor participation   Psychiatric:         Attention and Perception: She perceives visual hallucinations.         Behavior: Behavior is cooperative.         Thought Content: Thought content is paranoid and delusional.        Significant Labs: All pertinent labs within the past 24 hours have been reviewed.  Significant Imaging: I have reviewed all pertinent imaging results/findings within the past 24 hours.

## 2024-01-07 NOTE — ASSESSMENT & PLAN NOTE
Nutrition consulted. Most recent weight and BMI monitored-     Measurements:  Wt Readings from Last 1 Encounters:   01/06/24 58.1 kg (128 lb 1.4 oz)   Body mass index is 22.69 kg/m².    Patient has been screened and assessed by RD.    Malnutrition Type:  Context: chronic illness  Level: severe    Malnutrition Characteristic Summary:  Subcutaneous Fat (Malnutrition):  (moderate to severe)  Muscle Mass (Malnutrition):  (moderate to severe)  Hand  Strength, Left (Malnutrition): reduced  Hand  Strength, Right (Malnutrition): reduced    Interventions/Recommendations (treatment strategy):  1.) Recommend continuing Low Na diet, fluid per MD. 2.) Recommend Boost Plus (or Boost equivalent) BID to help optimize PRO/Kcal intake (32% of the FR). 3.) Continue to provide folic acid and thiamine- consider adding MVI. RD to monitor PO intake, skin, labs, wt.

## 2024-01-07 NOTE — PLAN OF CARE
Patient AAOx3; disoriented to place and/or situation (variable). VSS on 2L NC. Afebrile. NSR on Tele. Pt denied pain this shift. Skin intact w/ right labia swelling +2 and excoriation. Incontinent x2. Multiple BM noted this shift. Bed bound w/ multiple assist. Bed locked and lowered, call bell in reach, nonskid socks on when OOB. Reviewed plan of care and fall precautions w/ pt- pt verbalized understanding, however requires repeated reinforcing. Reminded to call for assistance. Standard precautions maintained.

## 2024-01-07 NOTE — ASSESSMENT & PLAN NOTE
Patient's anemia is currently controlled. Has not received any PRBCs to date. Etiology likely d/t chronic disease due to Chronic liver disease  Current CBC reviewed-   Lab Results   Component Value Date    HGB 9.1 (L) 01/07/2024    HCT 27.5 (L) 01/07/2024     Monitor serial CBC and transfuse if patient becomes hemodynamically unstable, symptomatic or H/H drops below 7.    She is on chronic thiamine and folate.  We will add multivitamin

## 2024-01-07 NOTE — PLAN OF CARE
"  Problem: Violence Risk or Actual  Goal: Anger and Impulse Control  Outcome: Ongoing, Progressing     Problem: Adult Inpatient Plan of Care  Goal: Plan of Care Review  Outcome: Ongoing, Progressing  Goal: Patient-Specific Goal (Individualized)  Outcome: Ongoing, Progressing  Goal: Absence of Hospital-Acquired Illness or Injury  Outcome: Ongoing, Progressing  Goal: Optimal Comfort and Wellbeing  Outcome: Ongoing, Progressing  Goal: Readiness for Transition of Care  Outcome: Ongoing, Progressing     Problem: Impaired Wound Healing  Goal: Optimal Wound Healing  Outcome: Ongoing, Progressing  Pt mentation waxing waning this shift. Refused lactulose. Accepted oral potassium replacement. Incontinent of bowel and bladder. Pt had 4 large BM's this shift. Straight cath for urine specimen. Pt with acute episode of increased confusion reporting that she had to leave and that she "shouldn't be here". Reassurance offered and prn dose of xanax administered with effective results.     "

## 2024-01-07 NOTE — PLAN OF CARE
Problem: Physical Therapy  Goal: Physical Therapy Goal  Description: PT evaluation complete - patient requiring total assistance; plans to return to long-term NH placement.  Outcome: Met

## 2024-01-07 NOTE — ASSESSMENT & PLAN NOTE
Patient with Hypoxic Respiratory failure which is Acute on chronic.  she is not on home oxygen. Supplemental oxygen was provided and noted improvement in her O2 sats.  -chest x-ray with right-sided pleural effusion.  Status post thoracentesis and 650 mL of fluid drained.  Awaiting cultures.  Oxygen requirements decreased to 2 L at this time.  Continue to monitor

## 2024-01-07 NOTE — ASSESSMENT & PLAN NOTE
She is pleasantly disoriented and talks about calculator and going out to the office and she is currently at the office and overall has delusions/hallucinations.  Confirmed with ancillary staff and they also confirmed that she has this paranoia and does not trust or eat any food here and ask and refuses medications and has to ask where it came from etc. she is on multiple psychiatric medications and has a history.  We will check with Psychiatry.     Attempted to call the sister however could not reach we will discuss again.       Per goals of care discussion yesterday with patient when she was oriented x2,  She herself thinks she is only okay with invasive lines and pressor support however is not of the favor to have chest compressions, intubation or defibrillation in case of emergency however she will discuss with the sister and is okay to discuss with palliative.  When discussed with sister she had said that she will respect her wishes however she would like to talk to her

## 2024-01-07 NOTE — ASSESSMENT & PLAN NOTE
Continue home regimen: lithium, olanzapine, keppra.  -lithium level pending  -alert however some delusions when asked why she's not on her lactulose.  -nurses also confirmed that she is paranoid about her medications and diet    We will request Psychiatry consultation

## 2024-01-07 NOTE — ASSESSMENT & PLAN NOTE
Patient has Abnormal Phosphorus: hypophosphatemia. Will continue to monitor electrolytes closely. Will replace the affected electrolytes and repeat labs to be done after interventions completed. The patient's phosphorus results have been reviewed and are listed below.  Recent Labs   Lab 01/08/24  0627   PHOS 2.5*

## 2024-01-07 NOTE — ASSESSMENT & PLAN NOTE
Nutrition consulted. Most recent weight and BMI monitored-     Measurements:  Wt Readings from Last 1 Encounters:   01/06/24 58.1 kg (128 lb 1.4 oz)   Body mass index is 22.69 kg/m².    Patient has been screened and assessed by RD.    Malnutrition Type:  Context: chronic illness  Level: severe    Malnutrition Characteristic Summary:  Subcutaneous Fat (Malnutrition):  (moderate to severe)  Muscle Mass (Malnutrition):  (moderate to severe)  Hand  Strength, Left (Malnutrition): reduced  Hand  Strength, Right (Malnutrition): reduced    Interventions/Recommendations (treatment strategy):  1.) Recommend continuing Low Na diet, fluid per MD. 2.) Recommend Boost Plus (or Boost equivalent) BID to help optimize PRO/Kcal intake (32% of the FR). 3.) Continue to provide folic acid and thiamine- consider adding MVI. RD to monitor PO intake, skin, labs, wt.    Oral intake remains reduced and mouth is dry.  We will give gentle hydration.

## 2024-01-07 NOTE — PROGRESS NOTES
[facility] - Transplant [facility] Medicine  Progress Note    Patient Name: Marely Hamilton  MRN: [id number]  Patient Class: IP- Inpatient   Admission Date: 1/4/2024  Length of Stay: 2 days  Attending Physician: Kaylee Chavez MD  Primary Care Provider: Viktor Ross MD        Subjective:     Principal Problem:Decompensated hepatic cirrhosis        HPI:  Patient is a 58-year-old woman with past medical history significant for alcoholic cirrhosis, bipolar disorder, chronic anemia, thrombocytopenia, cholelithiasis, DVT with IVC filter in place, prior GI bleeds, ESBL UTI history, seizure disorder presenting from nursing [facility] for evaluation of abdominal distension, peripheral edema, and jaundice.     Patient denies fever, chills, or abdominal pain. Patient was recently admitted in early December for hepatic encephalopathy.   Patient denies any pain at this time. She reports she has been urinating frequently though difficult to obtain history.   She denies recent illness/fever/chills/nausea/vomiting/diarrhea/constipation.  Patient has not been taking lactulose. Reports compliance with psychiatric medications and A&Ox3 but reports she can't take the lactulose due to not tolerating the sun.    Hospital Course:   In the ED, vitals stable but hypoxic on room air to the 80s, which improved with supplemental oxygen.  Presentation consistent with decompensated liver failure, meld score 22.    She was sent in by her nursing home facility for mild abdominal distention as well as significant jaundice.    Chest x-ray revealed a moderate-to-large pleural effusion with compressive atelectasis. Likely from her ascites. Her abdomen is soft, nontender.      Intake labs remarkable for Na 142, BUN 6, Cr 0.4, Calcium 7.8, , Tbili 12.1, , ALT 41, Ammonia 114, lactic 1.0.     Will admit for decompensated cirrhosis. Continue on lactulose for treatment of HE, rocephin for SBP ppx. Hepatology and IR consulted for  thoracentesis and further recommendations.   Patient is oriented to person, place, time, and situation but with some delusions.     Overview/Hospital Course:  No notes on file    Interval History:   Alert however more disoriented today only oriented to self. Oxygen requirements decreased to 2 L after thoracentesis.  Has been incontinent to stool and urine.      She is pleasantly disoriented and talks about calculator and going out to the office and she is currently at the office and overall has delusions/hallucinations.  Confirmed with ancillary staff and they also confirmed that she has this paranoia and does not trust or eat any food here and ask and refuses medications and has to ask where it came from etc. she is on multiple psychiatric medications and has a history.  We will check with Psychiatry.    Attempted to call the sister however could not reach we will discuss again.      Per goals of care discussion yesterday with patient when she was oriented x2,  She herself thinks she is only okay with invasive lines and pressor support however is not of the favor to have chest compressions, intubation or defibrillation in case of emergency however she will discuss with the sister and is okay to discuss with palliative.  When discussed with sister she had said that she will respect her wishes however she would like to talk to her.    Review of Systems   Constitutional:  Negative for activity change, chills and fever.   HENT:  Negative for congestion.    Eyes:  Negative for visual disturbance.   Respiratory:  Positive for shortness of breath. Negative for cough and chest tightness.    Cardiovascular:  Negative for chest pain, palpitations and leg swelling.   Gastrointestinal:  Positive for abdominal distention. Negative for abdominal pain, blood in stool, constipation, diarrhea, nausea and vomiting.   Genitourinary:  Negative for dysuria, frequency, hematuria and urgency.   Musculoskeletal:  Negative for arthralgias,  back pain, gait problem and neck pain.   Skin:  Positive for color change. Negative for rash and wound.   Neurological:  Positive for tremors. Negative for dizziness, seizures, syncope, facial asymmetry, speech difficulty and light-headedness.   Psychiatric/Behavioral:  Positive for hallucinations. Negative for agitation and confusion. The patient is not nervous/anxious.      Objective:     Vital Signs (Most Recent):  Temp: 97.2 °F (36.2 °C) (01/07/24 1210)  Pulse: 94 (01/07/24 1501)  Resp: 20 (01/07/24 1210)  BP: (!) 156/72 (01/07/24 1210)  SpO2: 97 % (01/07/24 1210) Vital Signs (24h Range):  Temp:  [97.2 °F (36.2 °C)-98.5 °F (36.9 °C)] 97.2 °F (36.2 °C)  Pulse:  [86-98] 94  Resp:  [16-20] 20  SpO2:  [95 %-97 %] 97 %  BP: (128-156)/(69-74) 156/72     Body mass index is 22.69 kg/m².    Physical Exam  Vitals reviewed.   Constitutional:       General: She is not in acute distress.     Appearance: She is cachectic. She is ill-appearing.   HENT:      Head: Normocephalic and atraumatic.      Mouth/Throat:      Mouth: Mucous membranes are dry.      Pharynx: No oropharyngeal exudate.   Eyes:      General: Scleral icterus present.      Extraocular Movements: Extraocular movements intact.      Conjunctiva/sclera: Conjunctivae normal.   Cardiovascular:      Rate and Rhythm: Normal rate and regular rhythm.      Heart sounds: Normal heart sounds. No murmur heard.  Pulmonary:      Effort: Pulmonary effort is normal. No respiratory distress.      Breath sounds: No wheezing or rales.   Abdominal:      General: Bowel sounds are normal. There is distension.      Palpations: Abdomen is soft.      Tenderness: There is no abdominal tenderness. There is no guarding.   Musculoskeletal:         General: No tenderness. Normal range of motion.      Cervical back: Normal range of motion and neck supple.   Lymphadenopathy:      Cervical: No cervical adenopathy.   Skin:     General: Skin is warm and dry.      Capillary Refill: Capillary refill  takes less than 2 seconds.      Coloration: Skin is jaundiced.      Findings: No rash.   Neurological:      Mental Status: She is alert. She is disoriented.      Motor: Weakness: bilateral lower extremities.      Comments: Poor participation   Psychiatric:         Attention and Perception: She perceives visual hallucinations.         Behavior: Behavior is cooperative.         Thought Content: Thought content is paranoid and delusional.        Significant Labs: All pertinent labs within the past 24 hours have been reviewed.  Significant Imaging: I have reviewed all pertinent imaging results/findings within the past 24 hours.    Assessment/Plan:      * Decompensated hepatic cirrhosis  Patient with known Cirrhosis with Child's class C. Co-morbidities are present and inclusive of ascites, malnutrition, anemia/pancytopenia, and Pleural effusion .  MELD-Na score calculated; MELD 3.0: 28 at 1/7/2024  6:35 AM  MELD-Na: 24 at 1/7/2024  6:35 AM  Calculated from:  Serum Creatinine: 0.4 mg/dL (Using min of 1 mg/dL) at 1/7/2024  6:35 AM  Serum Sodium: 144 mmol/L (Using max of 137 mmol/L) at 1/7/2024  6:35 AM  Total Bilirubin: 18.4 mg/dL at 1/7/2024  6:35 AM  Serum Albumin: 2.3 g/dL at 1/7/2024  6:35 AM  INR(ratio): 1.8 at 1/7/2024  6:35 AM  Age at listing (hypothetical): 58 years  Sex: Female at 1/7/2024  6:35 AM      Continue chronic meds. Etiology likely ETOH. Will avoid any hepatotoxic meds, and monitor CBC/CMP/INR for synthetic function.     Presenting with decompensation (increased ascites, Bili 12, ammonia 114).  Status post thoracentesis of 650 mL.  Awaiting cultures.  Could not find a pocket for paracentesis.  Continue Rocephin for now  -restart lactulose as she has been noncompliant  Appreciate hepatology recommendations.  Recommendations to include physical therapy occupational therapy, palliative care discussions for goals of care.  She has not a candidate for transplantation given frailty, poor social support and  noncompliance with medication    Acute hypoxic respiratory failure  Patient with Hypoxic Respiratory failure which is Acute on chronic.  she is not on home oxygen. Supplemental oxygen was provided and noted improvement in her O2 sats.  -chest x-ray with right-sided pleural effusion.  Status post thoracentesis and 650 mL of fluid drained.  Awaiting cultures.  Oxygen requirements decreased to 2 L at this time.  Continue to monitor    Presence of IVC filter  Chronic IVC filter as she is at risk for bleeding and had history of DVTs      Goals of care, counseling/discussion  She is pleasantly disoriented and talks about calculator and going out to the office and she is currently at the office and overall has delusions/hallucinations.  Confirmed with ancillary staff and they also confirmed that she has this paranoia and does not trust or eat any food here and ask and refuses medications and has to ask where it came from etc. she is on multiple psychiatric medications and has a history.  We will check with Psychiatry.     Attempted to call the sister however could not reach we will discuss again.       Per goals of care discussion yesterday with patient when she was oriented x2,  She herself thinks she is only okay with invasive lines and pressor support however is not of the favor to have chest compressions, intubation or defibrillation in case of emergency however she will discuss with the sister and is okay to discuss with palliative.  When discussed with sister she had said that she will respect her wishes however she would like to talk to her    Bipolar 1 disorder, depressed, severe  Continue home regimen: lithium, olanzapine, keppra.  -lithium level pending  -alert however some delusions when asked why she's not on her lactulose.  -nurses also confirmed that she is paranoid about her medications and diet    We will request Psychiatry consultation      Severe malnutrition  Nutrition consulted. Most recent weight and  BMI monitored-     Measurements:  Wt Readings from Last 1 Encounters:   01/06/24 58.1 kg (128 lb 1.4 oz)   Body mass index is 22.69 kg/m².    Patient has been screened and assessed by RD.    Malnutrition Type:  Context: chronic illness  Level: severe    Malnutrition Characteristic Summary:  Subcutaneous Fat (Malnutrition):  (moderate to severe)  Muscle Mass (Malnutrition):  (moderate to severe)  Hand  Strength, Left (Malnutrition): reduced  Hand  Strength, Right (Malnutrition): reduced    Interventions/Recommendations (treatment strategy):  1.) Recommend continuing Low Na diet, fluid per MD. 2.) Recommend Boost Plus (or Boost equivalent) BID to help optimize PRO/Kcal intake (32% of the FR). 3.) Continue to provide folic acid and thiamine- consider adding MVI. RD to monitor PO intake, skin, labs, wt.      Macrocytic anemia  Patient's anemia is currently controlled. Has not received any PRBCs to date. Etiology likely d/t chronic disease due to Chronic liver disease  Current CBC reviewed-   Lab Results   Component Value Date    HGB 9.1 (L) 01/07/2024    HCT 27.5 (L) 01/07/2024     Monitor serial CBC and transfuse if patient becomes hemodynamically unstable, symptomatic or H/H drops below 7.    She is on chronic thiamine and folate.  We will add multivitamin    Ascites  Mild ascites seen on imaging however no pocket seen on attempt for paracentesis.        VTE Risk Mitigation (From admission, onward)           Ordered     Reason for No Pharmacological VTE Prophylaxis  Once        Question:  Reasons:  Answer:  Risk of Bleeding    01/05/24 0317     IP VTE HIGH RISK PATIENT  Once         01/05/24 0317     Place sequential compression device  Until discontinued         01/05/24 0317                    Discharge Planning   BRAYAN: 1/9/2024     Code Status: Full Code   Is the patient medically ready for discharge?: No    Reason for patient still in hospital (select all that apply): Treatment and Consult  recommendations  Discharge Plan A: Return to nursing home                  Kaylee Chavez MD  Department of Hospital Medicine   [facility] - Transplant Stepdown

## 2024-01-07 NOTE — ASSESSMENT & PLAN NOTE
Patient's anemia is currently controlled. Has not received any PRBCs to date. Etiology likely d/t chronic disease due to Chronic liver disease  Current CBC reviewed-   Lab Results   Component Value Date    HGB 8.6 (L) 01/06/2024    HCT 25.6 (L) 01/06/2024     Monitor serial CBC and transfuse if patient becomes hemodynamically unstable, symptomatic or H/H drops below 7.    She is on chronic thiamine and folate.  We will add multivitamin

## 2024-01-07 NOTE — SUBJECTIVE & OBJECTIVE
Interval History:   Mentation improved from yesterday.  Oxygen requirements decreased to 2 L after thoracentesis.  Has been incontinent to stool and urine.  Overall she is alert and oriented x2.  Discussed if okay with palliative care consultation and clarified goals.  She herself thinks she is only okay with invasive lines and pressor support however is not of the favor to have chest compressions, intubation or defibrillation in case of emergency however she will discuss with the sister and is okay to discuss with palliative.    Review of Systems   Constitutional:  Negative for activity change, chills and fever.   HENT:  Negative for congestion.    Eyes:  Negative for visual disturbance.   Respiratory:  Positive for shortness of breath. Negative for cough and chest tightness.    Cardiovascular:  Negative for chest pain, palpitations and leg swelling.   Gastrointestinal:  Positive for abdominal distention. Negative for abdominal pain, blood in stool, constipation, diarrhea, nausea and vomiting.   Genitourinary:  Negative for dysuria, frequency, hematuria and urgency.   Musculoskeletal:  Negative for arthralgias, back pain, gait problem and neck pain.   Skin:  Positive for color change. Negative for rash and wound.   Neurological:  Positive for tremors. Negative for dizziness, seizures, syncope, facial asymmetry, speech difficulty and light-headedness.   Psychiatric/Behavioral:  Negative for agitation and confusion. The patient is not nervous/anxious.      Objective:     Vital Signs (Most Recent):  Temp: 96.3 °F (35.7 °C) (01/06/24 1601)  Pulse: 91 (01/06/24 1601)  Resp: 19 (01/06/24 1601)  BP: (!) 155/73 (01/06/24 1601)  SpO2: 97 % (01/06/24 1601) Vital Signs (24h Range):  Temp:  [96.3 °F (35.7 °C)-98.8 °F (37.1 °C)] 96.3 °F (35.7 °C)  Pulse:  [70-93] 91  Resp:  [17-19] 19  SpO2:  [93 %-98 %] 97 %  BP: (119-155)/(63-73) 155/73     Body mass index is 22.69 kg/m².    Physical Exam  Vitals reviewed.   Constitutional:        General: She is not in acute distress.     Appearance: She is cachectic. She is ill-appearing.   HENT:      Head: Normocephalic and atraumatic.      Mouth/Throat:      Mouth: Mucous membranes are dry.      Pharynx: No oropharyngeal exudate.   Eyes:      General: Scleral icterus present.      Extraocular Movements: Extraocular movements intact.      Conjunctiva/sclera: Conjunctivae normal.   Cardiovascular:      Rate and Rhythm: Normal rate and regular rhythm.      Heart sounds: Normal heart sounds. No murmur heard.  Pulmonary:      Effort: Pulmonary effort is normal. No respiratory distress.      Breath sounds: No wheezing or rales.   Abdominal:      General: Bowel sounds are normal. There is distension.      Palpations: Abdomen is soft.      Tenderness: There is no abdominal tenderness. There is no guarding.   Musculoskeletal:         General: No tenderness. Normal range of motion.      Cervical back: Normal range of motion and neck supple.   Lymphadenopathy:      Cervical: No cervical adenopathy.   Skin:     General: Skin is warm and dry.      Capillary Refill: Capillary refill takes less than 2 seconds.      Coloration: Skin is jaundiced.      Findings: No rash.   Neurological:      General: No focal deficit present.      Mental Status: She is alert and oriented to person, place, and time.      Motor: Weakness (bilateral lower extremities) present.      Comments: Poor participation   Psychiatric:         Behavior: Behavior is cooperative.         Thought Content: Thought content normal.         Judgment: Judgment normal.        Significant Labs: All pertinent labs within the past 24 hours have been reviewed.  Significant Imaging: I have reviewed all pertinent imaging results/findings within the past 24 hours.

## 2024-01-07 NOTE — PT/OT/SLP EVAL
"Physical Therapy Evaluation & Discharge    Patient Name:  Marely Hamilton   MRN:  [id number]  Admit Date: 1/4/2024  Admitting Diagnosis:  Decompensated hepatic cirrhosis  Length of Stay: 2 days  Recent Surgery: * No surgery found *      Recommendations:     Discharge Recommendations:  No therapy indicated at discharge  Discharge Equipment Recommendations: none   Barriers to discharge: None    Assessment:     Marely Hamilton is a 58 y.o. female admitted with a medical diagnosis of Decompensated hepatic cirrhosis. Medical history includes seizures and bipolar disorder. Today she was able to perform all functional mobility with total assistance. At this time, patient is at their functional baseline and acute PT services are not indicated. PT will discontinue orders. See detailed evaluation below:    Plan:     Discharge PT orders. Please continue to encourage patient mobility.    Subjective   Communicated with RN prior to session.  Patient found HOB elevated upon PT entry to room, agreeable to evaluation.     Chief Complaint: Ascites (From [facility] for abd distention, BLE swelling, and jaudice. Hx of cirrhosis. )    Patient/Family Comments/goals: none state  Pain/Comfort:  Pain Rating 1: 0/10  Pain Rating Post-Intervention 1: 0/10    Living Environment:  Patient is a longterm resident at [facility].     Prior Level of Function:   Patient requires assistance with mobility & with ADLs. Patient uses DME as follows: wheelchair, walker, rolling.       Patient reports they will have assistance from NH staff upon discharge.    Objective:   Patient found with: telemetry, pulse ox (continuous), peripheral IV     General Precautions: Standard, fall   Orthopedic Precautions:N/A   Braces: N/A   Oxygen Device: Room Air  Vitals: /74   Pulse 90   Temp 98.1 °F (36.7 °C)   Resp 16   Ht 5' 3" (1.6 m)   Wt 58.1 kg (128 lb 1.4 oz)   SpO2 96%   BMI 22.69 kg/m²     Exams:  Cognition:   Alert and " Cooperative  AxOx1 - oriented to self only  Command following: able to follow simple commands with max cueing ~50% of the time  Fluency: clear/fluent  Hearing: Intact  Vision:  Intact visual fields  Skin Integrity: impaired - new onset redness to hip  Sensation: intact  Coordination: impaired  LE Strength:  L Lower Extremity: grossly 2/5  R Lower Extremity: grossly 2/5  LE ROM:  L Lower Extremity: limited knee extension  R Lower Extremity: limited knee extension      Outcome Measures:  AM-PAC 6 CLICK MOBILITY  Turning over in bed (including adjusting bedclothes, sheets and blankets)?: 2  Sitting down on and standing up from a chair with arms (e.g., wheelchair, bedside commode, etc.): 1  Moving from lying on back to sitting on the side of the bed?: 2  Moving to and from a bed to a chair (including a wheelchair)?: 1  Need to walk in hospital room?: 1  Climbing 3-5 steps with a railing?: 1  Basic Mobility Total Score: 8     Functional Mobility:    Bed Mobility:  Rolling/Turning to Left: total assistance  Rolling/Turning to Right: total assistance  Scooting to HOB via drawsheet: dependent  Supine to Sit: total assistance  Scooting anteriorly to EOB to have both feet planted on floor: total assistance  Sit to Supine: total assistance    Sitting Balance at Edge of Bed:  Assistance Level Required: Total Assistance  Postural deviations noted: rounded shoulders, forward head, R lateral lean  Resistant to sitting    Education:   Patient was educated on continued safe mobility throughout this admission.       Patient left HOB elevated with all lines intact, call button in reach, and RN notified.    GOALS:   Multidisciplinary Problems       Physical Therapy Goals       Not on file              Multidisciplinary Problems (Resolved)          Problem: Physical Therapy    Goal Priority Disciplines Outcome Goal Variances Interventions   Physical Therapy Goal   (Resolved)     PT, PT/OT Met     Description: PT evaluation complete -  patient requiring total assistance; plans to return to assisted NH placement.                       History:     Past Medical History:   Diagnosis Date    Addiction to drug     Alcohol abuse     Alcohol abuse, in remission 6/15/2015    14.5 weeks ago; AA weekly    Anemia     Anxiety 6/15/2015    Behavioral problem     Bipolar disorder     Bipolar disorder in remission 6/15/2015    Cirrhosis, Laennec's 6/15/2015    Depression     Encounter for blood transfusion     Epistaxis 6/15/2015    Fatigue     Glaucoma     Hematuria     Hepatic encephalopathy 6/15/2015    Hepatic enlargement     History of psychiatric care     History of psychiatric hospitalization     History of seizure 6/15/2015    1    hx of intentional Tylenol overdose 6/15/2015    2005- situational and hx of bipolar    Hx of psychiatric care     Macrocytic anemia 9/18/2015    6 units PRBC since June 2015    Jeana     Osteoarthritis     Other ascites 6/15/2015    Psychiatric exam requested by authority     Psychiatric problem     Psychosis 9/26/2019    Renal disorder     Seizures     Self-harming behavior     Suicide attempt     Therapy     Thrombocytopenia 6/15/2015       Past Surgical History:   Procedure Laterality Date    COSMETIC SURGERY      EMBOLIZATION N/A 6/11/2023    Procedure: EMBOLIZATION, BLOOD VESSEL;  Surgeon: Debbie Surgeon;  Location: [facility];  Service: Anesthesiology;  Laterality: N/A;    ESOPHAGOGASTRODUODENOSCOPY      ESOPHAGOGASTRODUODENOSCOPY N/A 7/6/2023    Procedure: EGD (ESOPHAGOGASTRODUODENOSCOPY);  Surgeon: Bernice Guillen MD;  Location: [address]);  Service: Endoscopy;  Laterality: N/A;    ESOPHAGOGASTRODUODENOSCOPY N/A 7/11/2023    Procedure: EGD (ESOPHAGOGASTRODUODENOSCOPY);  Surgeon: Rangel Broussard MD;  Location: [address]);  Service: Endoscopy;  Laterality: N/A;       Time Tracking:     PT Received On: 01/07/24  PT Start Time: 0840     PT Stop Time: 0856  PT Total Time (min): 16 min     Additional staff  present: OT - Co-evaluation with OT due to patient complexity and inability to tolerate multiple sessions due to poor activity tolerance. Patient required two skilled therapists to ensure safe mobilization.    Billable Minutes: Evaluation 8 and Therapeutic Activity 8    Aliyah Huber, PT, DPT  1/7/2024

## 2024-01-08 PROBLEM — Z71.89 ACP (ADVANCE CARE PLANNING): Status: ACTIVE | Noted: 2024-01-08

## 2024-01-08 LAB
ALBUMIN SERPL BCP-MCNC: 1.9 G/DL (ref 3.5–5.2)
ALP SERPL-CCNC: 155 U/L (ref 55–135)
ALT SERPL W/O P-5'-P-CCNC: 27 U/L (ref 10–44)
ANION GAP SERPL CALC-SCNC: 4 MMOL/L (ref 8–16)
AST SERPL-CCNC: 56 U/L (ref 10–40)
BACTERIA SPEC AEROBE CULT: NO GROWTH
BASOPHILS # BLD AUTO: 0.01 K/UL (ref 0–0.2)
BASOPHILS NFR BLD: 0.3 % (ref 0–1.9)
BILIRUB SERPL-MCNC: 17.3 MG/DL (ref 0.1–1)
BUN SERPL-MCNC: 8 MG/DL (ref 6–20)
CALCIUM SERPL-MCNC: 8.7 MG/DL (ref 8.7–10.5)
CHLORIDE SERPL-SCNC: 120 MMOL/L (ref 95–110)
CLINICAL BIOCHEMIST REVIEW: NORMAL
CO2 SERPL-SCNC: 20 MMOL/L (ref 23–29)
CREAT SERPL-MCNC: 0.4 MG/DL (ref 0.5–1.4)
DIFFERENTIAL METHOD BLD: ABNORMAL
EOSINOPHIL # BLD AUTO: 0.1 K/UL (ref 0–0.5)
EOSINOPHIL NFR BLD: 3.5 % (ref 0–8)
ERYTHROCYTE [DISTWIDTH] IN BLOOD BY AUTOMATED COUNT: 17.1 % (ref 11.5–14.5)
EST. GFR  (NO RACE VARIABLE): >60 ML/MIN/1.73 M^2
GLUCOSE SERPL-MCNC: 109 MG/DL (ref 70–110)
HCT VFR BLD AUTO: 24.2 % (ref 37–48.5)
HGB BLD-MCNC: 7.6 G/DL (ref 12–16)
IMM GRANULOCYTES # BLD AUTO: 0.03 K/UL (ref 0–0.04)
IMM GRANULOCYTES NFR BLD AUTO: 0.8 % (ref 0–0.5)
INR PPP: 2 (ref 0.8–1.2)
LYMPHOCYTES # BLD AUTO: 0.5 K/UL (ref 1–4.8)
LYMPHOCYTES NFR BLD: 12.9 % (ref 18–48)
MAGNESIUM SERPL-MCNC: 1.8 MG/DL (ref 1.6–2.6)
MCH RBC QN AUTO: 37.6 PG (ref 27–31)
MCHC RBC AUTO-ENTMCNC: 31.4 G/DL (ref 32–36)
MCV RBC AUTO: 120 FL (ref 82–98)
MONOCYTES # BLD AUTO: 0.3 K/UL (ref 0.3–1)
MONOCYTES NFR BLD: 9.1 % (ref 4–15)
NEUTROPHILS # BLD AUTO: 2.7 K/UL (ref 1.8–7.7)
NEUTROPHILS NFR BLD: 73.4 % (ref 38–73)
NRBC BLD-RTO: 0 /100 WBC
PHOSPHATE SERPL-MCNC: 2.5 MG/DL (ref 2.7–4.5)
PLATELET # BLD AUTO: 42 K/UL (ref 150–450)
PLPETH BLD-MCNC: <10 NG/ML
PMV BLD AUTO: 10.2 FL (ref 9.2–12.9)
POCT GLUCOSE: 126 MG/DL (ref 70–110)
POCT GLUCOSE: 91 MG/DL (ref 70–110)
POPETH BLD-MCNC: <10 NG/ML
POTASSIUM SERPL-SCNC: 3.9 MMOL/L (ref 3.5–5.1)
PROT FLD-MCNC: <1 G/DL
PROT SERPL-MCNC: 4.5 G/DL (ref 6–8.4)
PROTHROMBIN TIME: 20.4 SEC (ref 9–12.5)
RBC # BLD AUTO: 2.02 M/UL (ref 4–5.4)
SODIUM SERPL-SCNC: 144 MMOL/L (ref 136–145)
SPECIMEN SOURCE: NORMAL
WBC # BLD AUTO: 3.73 K/UL (ref 3.9–12.7)

## 2024-01-08 PROCEDURE — 80053 COMPREHEN METABOLIC PANEL: CPT | Performed by: STUDENT IN AN ORGANIZED HEALTH CARE EDUCATION/TRAINING PROGRAM

## 2024-01-08 PROCEDURE — 85610 PROTHROMBIN TIME: CPT | Performed by: STUDENT IN AN ORGANIZED HEALTH CARE EDUCATION/TRAINING PROGRAM

## 2024-01-08 PROCEDURE — 63600175 PHARM REV CODE 636 W HCPCS: Performed by: STUDENT IN AN ORGANIZED HEALTH CARE EDUCATION/TRAINING PROGRAM

## 2024-01-08 PROCEDURE — 25000003 PHARM REV CODE 250: Performed by: STUDENT IN AN ORGANIZED HEALTH CARE EDUCATION/TRAINING PROGRAM

## 2024-01-08 PROCEDURE — 90792 PSYCH DIAG EVAL W/MED SRVCS: CPT | Mod: GC,,, | Performed by: PSYCHIATRY & NEUROLOGY

## 2024-01-08 PROCEDURE — 99223 1ST HOSP IP/OBS HIGH 75: CPT | Mod: ,,,

## 2024-01-08 PROCEDURE — 85025 COMPLETE CBC W/AUTO DIFF WBC: CPT | Performed by: STUDENT IN AN ORGANIZED HEALTH CARE EDUCATION/TRAINING PROGRAM

## 2024-01-08 PROCEDURE — 36415 COLL VENOUS BLD VENIPUNCTURE: CPT | Performed by: STUDENT IN AN ORGANIZED HEALTH CARE EDUCATION/TRAINING PROGRAM

## 2024-01-08 PROCEDURE — 94761 N-INVAS EAR/PLS OXIMETRY MLT: CPT

## 2024-01-08 PROCEDURE — 27000221 HC OXYGEN, UP TO 24 HOURS

## 2024-01-08 PROCEDURE — 83735 ASSAY OF MAGNESIUM: CPT | Performed by: STUDENT IN AN ORGANIZED HEALTH CARE EDUCATION/TRAINING PROGRAM

## 2024-01-08 PROCEDURE — 84100 ASSAY OF PHOSPHORUS: CPT | Performed by: STUDENT IN AN ORGANIZED HEALTH CARE EDUCATION/TRAINING PROGRAM

## 2024-01-08 PROCEDURE — 20600001 HC STEP DOWN PRIVATE ROOM

## 2024-01-08 RX ORDER — OLANZAPINE 2.5 MG/1
2.5 TABLET ORAL NIGHTLY
Status: DISCONTINUED | OUTPATIENT
Start: 2024-01-08 | End: 2024-01-11

## 2024-01-08 RX ORDER — SODIUM CHLORIDE 9 MG/ML
INJECTION, SOLUTION INTRAVENOUS CONTINUOUS
Status: DISCONTINUED | OUTPATIENT
Start: 2024-01-08 | End: 2024-01-09

## 2024-01-08 RX ADMIN — LACTULOSE 30 G: 20 SOLUTION ORAL at 03:01

## 2024-01-08 RX ADMIN — SODIUM CHLORIDE: 9 INJECTION, SOLUTION INTRAVENOUS at 09:01

## 2024-01-08 RX ADMIN — LITHIUM CARBONATE 300 MG: 300 TABLET, FILM COATED, EXTENDED RELEASE ORAL at 08:01

## 2024-01-08 RX ADMIN — RIFAXIMIN 550 MG: 550 TABLET ORAL at 08:01

## 2024-01-08 RX ADMIN — PANTOPRAZOLE SODIUM 40 MG: 40 GRANULE, DELAYED RELEASE ORAL at 08:01

## 2024-01-08 RX ADMIN — CHOLECALCIFEROL TAB 25 MCG (1000 UNIT) 2000 UNITS: 25 TAB at 08:01

## 2024-01-08 RX ADMIN — THERA TABS 1 TABLET: TAB at 08:01

## 2024-01-08 RX ADMIN — LEVETIRACETAM 1000 MG: 100 SOLUTION ORAL at 08:01

## 2024-01-08 RX ADMIN — LACTULOSE 30 G: 20 SOLUTION ORAL at 08:01

## 2024-01-08 RX ADMIN — Medication 100 MG: at 08:01

## 2024-01-08 RX ADMIN — OLANZAPINE 2.5 MG: 2.5 TABLET, FILM COATED ORAL at 08:01

## 2024-01-08 RX ADMIN — FOLIC ACID 1 MG: 1 TABLET ORAL at 08:01

## 2024-01-08 RX ADMIN — CEFTRIAXONE 2 G: 2 INJECTION, POWDER, FOR SOLUTION INTRAMUSCULAR; INTRAVENOUS at 05:01

## 2024-01-08 NOTE — NURSING
Plan of care reviewed on am rounds, tmax 100.3  other VS stable.Tele ST ( low 100's) wbc 3  anemia stable plts 42.INR 2 TB 17.3 Skin/sclera jaundiced. Oriented to person only. Incontinent of urine/stool with Lactulose continued. Abd u/s in progress, NPO since bfast.Turned/repositioned every 2 hours.Palliative care following ( see note ) DNR status discussed but no order yet placed. Fall, seizure, aspiration precautions maintained.

## 2024-01-08 NOTE — HPI
HPI: Per Chart Review: Patient is a 58-year-old woman with past medical history significant for alcoholic cirrhosis, bipolar disorder, chronic anemia, thrombocytopenia, cholelithiasis, DVT with IVC filter in place, prior GI bleeds, ESBL UTI history, seizure disorder presenting from nursing home [facility] for evaluation of abdominal distension, peripheral edema, and jaundice.      Patient denies fever, chills, or abdominal pain. Patient was recently admitted in early December for hepatic encephalopathy.   Patient denies any pain at this time. She reports she has been urinating frequently though difficult to obtain history.   She denies recent illness/fever/chills/nausea/vomiting/diarrhea/constipation.  Patient has not been taking lactulose. Reports compliance with psychiatric medications and A&Ox3 but reports she can't take the lactulose due to not tolerating the sun.    Hospital Course:   In the ED, vitals stable but hypoxic on room air to the 80s, which improved with supplemental oxygen.  Presentation consistent with decompensated liver failure, meld score 22.    She was sent in by her nursing home facility for mild abdominal distention as well as significant jaundice.    Chest x-ray revealed a moderate-to-large pleural effusion with compressive atelectasis. Likely from her ascites. Her abdomen is soft, nontender.       Intake labs remarkable for Na 142, BUN 6, Cr 0.4, Calcium 7.8, , Tbili 12.1, , ALT 41, Ammonia 114, lactic 1.0.      Will admit for decompensated cirrhosis. Continue on lactulose for treatment of HE, rocephin for SBP ppx. Hepatology and IR consulted for thoracentesis and further recommendations.   Patient is oriented to person, place, time, and situation but with some delusions.

## 2024-01-08 NOTE — CONSULTS
[facility]  Palliative Medicine  Consult Note    Patient Name: Marely Hamilton  MRN: [id number]  Admission Date: 1/4/2024  Hospital Length of Stay: 3 days  Code Status: Full Code   Attending Provider: Kaylee Chavez MD  Consulting Provider: MIKA Ponce  Primary Care Physician: Viktor Ross MD  Principal Problem:Decompensated hepatic cirrhosis    Patient information was obtained from relative(s) and primary team.      Consults  Assessment/Plan:     Palliative Care  Palliative care encounter  Impression: Marely Hamilton is a 59 y/o female with multiple hospitalizations over the past year who presents from the NH in which she resides. Pt with history of alcoholic cirrhosis, bipolar disorder, chronic anemia, thrombocytopenia, cholelithiasis, DVT with IVC filter in place, prior GI bleeds, ESBL UTI history, seizure disorder. Per chart review pt has been declined for a liver Tx twice for frailty, non-compliance, an combordidities. This morning she is jaundiced and awake, oriented only to person. She does state that Zaria (sister) is who to contact to make decisions. She does not appear to be in any acute distress and does not show any s/s of pain or labored breathing. She denies pain.     Reason for Consult: Per communication with Dr. Chavez, MALI and ACP needed.     Advance Care Planning  Code Status  In light of the patients advanced and life limiting illness,I engaged the the family in a voluntary conversation about the patient's preferences for care  at the very end of life. The patient wishes to have a natural, peaceful death.  Along those lines, the patient does not wish to have CPR or other invasive treatments performed when her heart and/or breathing stops. I communicated to the family that a DNR order would be placed in her medical record to reflect this preference.  I spent a total of 25 minutes engaging the patient in this advance care planning discussion.        GOC: As pt was only  oriented to self upon my visit this am, I called and spoke with sister Zaria ([phone]) who lives in [facility]. We discussed her advanced liver disease and lack of candidacy for transplant. Sister was surprised to hear this, and I have asked primary team to reach out for prognostication. I did explain liver disease and continued progression without transplant availability. Zaria did hope to [place], but we discussed travel is unlikely at this time. Zaria shared that she wishes to fulfill her sisters wishes in any way possible and therefore agreed to DNR status, per conversation MD had with pt over the weekend. I notified Dr. Chavez of sisters request to place DNR. Zaria has been unable to talk to her sister and we arranged a time to make this phone call that works with her schedule tomorrow morning.     MPOA: Per chart review, pt has requested that Zaria be her decision maker if she could not. Zaria state that she has MPOA, copy to be placed in chart.     Symptoms   Pt denies pain.   No other symptoms noted at this time.     Recommendations  -Primary/hepatology to speak with sister re/ prognosis.  -DNR to be placed by Dr. Chavez.  - I will facilitate phone call w/ Zaria at  tomorrow to provide an opportunity for sisters to talk, if pt able.         Thank you for your consult. I will follow-up with patient. Please contact us if you have any additional questions.    Subjective:     HPI:   HPI: Per Chart Review: Patient is a 58-year-old woman with past medical history significant for alcoholic cirrhosis, bipolar disorder, chronic anemia, thrombocytopenia, cholelithiasis, DVT with IVC filter in place, prior GI bleeds, ESBL UTI history, seizure disorder presenting from [facility] for evaluation of abdominal distension, peripheral edema, and jaundice.      Patient denies fever, chills, or abdominal pain. Patient was recently admitted in early December for hepatic  encephalopathy.   Patient denies any pain at this time. She reports she has been urinating frequently though difficult to obtain history.   She denies recent illness/fever/chills/nausea/vomiting/diarrhea/constipation.  Patient has not been taking lactulose. Reports compliance with psychiatric medications and A&Ox3 but reports she can't take the lactulose due to not tolerating the sun.    Hospital Course:   In the ED, vitals stable but hypoxic on room air to the 80s, which improved with supplemental oxygen.  Presentation consistent with decompensated liver failure, meld score 22.    She was sent in by her nursing home facility for mild abdominal distention as well as significant jaundice.    Chest x-ray revealed a moderate-to-large pleural effusion with compressive atelectasis. Likely from her ascites. Her abdomen is soft, nontender.       Intake labs remarkable for Na 142, BUN 6, Cr 0.4, Calcium 7.8, , Tbili 12.1, , ALT 41, Ammonia 114, lactic 1.0.      Will admit for decompensated cirrhosis. Continue on lactulose for treatment of HE, rocephin for SBP ppx. Hepatology and IR consulted for thoracentesis and further recommendations.   Patient is oriented to person, place, time, and situation but with some delusions.        Hospital Course:  No notes on file      Medications:  Continuous Infusions:  Scheduled Meds:   cefTRIAXone (ROCEPHIN) IVPB  2 g Intravenous Q24H    folic acid  1 mg Oral Daily    lactulose  30 g Oral TID    levetiracetam  1,000 mg Oral BID    lithium  300 mg Oral QHS    multivitamin  1 tablet Oral Daily    OLANZapine  5 mg Oral QHS    pantoprazole  40 mg Oral BID    rifAXIMin  550 mg Oral BID    thiamine  100 mg Oral Daily    vitamin D  2,000 Units Oral Daily     PRN Meds:ALPRAZolam, glucagon (human recombinant), glucose, glucose, insulin aspart U-100, sodium chloride 0.9%    Objective:     Vital Signs (Most Recent):  Temp: 98.6 °F (37 °C) (01/08/24 1148)  Pulse: 104 (01/08/24  1148)  Resp: 18 (01/08/24 1148)  BP: 128/60 (01/08/24 1148)  SpO2: (!) 94 % (01/08/24 1148) Vital Signs (24h Range):  Temp:  [97.2 °F (36.2 °C)-100.3 °F (37.9 °C)] 98.6 °F (37 °C)  Pulse:  [] 104  Resp:  [17-20] 18  SpO2:  [92 %-97 %] 94 %  BP: (118-156)/(60-82) 128/60     Weight: 58.1 kg (128 lb 1.4 oz)  Body mass index is 22.69 kg/m².       Physical Exam  Constitutional:       Appearance: She is ill-appearing.   Abdominal:      General: There is distension.   Skin:     Coloration: Skin is jaundiced.   Neurological:      Comments: Awake, oriented to person only.             Review of Symptoms      Symptom Assessment (ESAS 0-10 Scale)  Pain:  0  Dyspnea:  0  Anxiety:  0  Nausea:  0  Depression:  0  Anorexia:  0  Fatigue:  0  Insomnia:  0  Restlessness:  0  Agitation:  0  Unable to complete assessment due to Acuity of condition         Pain Assessment in Advanced Demential Scale (PAINAD)   Breathing - Independent of vocalization:  0  Negative vocalization:  0  Facial expression:  0  Body language:  0  Consolability:  0  Total:  0    Performance Status:  50    Living Arrangements:  Lives in nursing home    Psychosocial/Cultural:   See Palliative Psychosocial Note: Yes  Lives in [facility] after ICU stay in 2023. Sister is decision maker who lives in [facility].   **Primary  to Follow**  Palliative Care  Consult: Yes        Advance Care Planning  Advance Directives:   Living Will: No    LaPOST: No    Do Not Resuscitate Status: Yes (to be filled out today per primary MD.)    Medical Power of : Documentation in chart states that pt wishes her sister Zaria to make decisions..      Decision Making:  Family answered questions and Patient unable to communicate due to disease severity/cognitive impairment  Goals of Care: The family endorses that what is most important right now is to focus on improvement in condition but with limits to invasive therapies    Accordingly, we have  decided that the best plan to meet the patient's goals includes continuing with treatment while making pt a DNR also at this time.          Significant Labs: CBC:   Recent Labs   Lab 01/07/24  0635 01/08/24  0626   WBC 4.48 3.73*   HGB 9.1* 7.6*   HCT 27.5* 24.2*   PLT 45* 42*     CMP:   Recent Labs   Lab 01/07/24  0635 01/08/24  0627    144   K 4.4 3.9   * 120*   CO2 23 20*   * 109   BUN 7 8   CREATININE 0.4* 0.4*   CALCIUM 8.6* 8.7   PROT 5.2* 4.5*   ALBUMIN 2.3* 1.9*   BILITOT 18.4* 17.3*   ALKPHOS 196* 155*   AST 71* 56*   ALT 33 27   ANIONGAP 5* 4*     CBC:   Recent Labs   Lab 01/08/24 0626   WBC 3.73*   HGB 7.6*   HCT 24.2*   *   PLT 42*     BMP:  Recent Labs   Lab 01/08/24 0627         K 3.9   *   CO2 20*   BUN 8   CREATININE 0.4*   CALCIUM 8.7   MG 1.8     LFT:  Lab Results   Component Value Date    AST 56 (H) 01/08/2024    GGT 33 06/17/2023    ALKPHOS 155 (H) 01/08/2024    BILITOT 17.3 (H) 01/08/2024     Albumin:   Albumin   Date Value Ref Range Status   01/08/2024 1.9 (L) 3.5 - 5.2 g/dL Final     Protein:   Total Protein   Date Value Ref Range Status   01/08/2024 4.5 (L) 6.0 - 8.4 g/dL Final     Lactic acid:   Lab Results   Component Value Date    LACTATE 1.0 01/05/2024    LACTATE 2.3 (H) 11/29/2023       Significant Imaging: I have reviewed all pertinent imaging results/findings within the past 24 hours.    I spent a total of 75 minutes on the day of the visit. This includes face to face time in discussion of goals of care, symptom assessment, coordination of care and emotional support.  This also includes non-face to face time preparing to see the patient (eg, review of tests/imaging), obtaining and/or reviewing separately obtained history, documenting clinical information in the electronic or other health record, independently interpreting results and communicating results to the patient/family/caregiver, or care coordinator.    Catalina Brunner,  CNS  Palliative Medicine  [facility] Trans[facility] Step[facility]

## 2024-01-08 NOTE — TREATMENT PLAN
Hepatology Treatment Plan    Marely Hamilton is a 58 y.o. female admitted to hospital 1/4/2024 (Hospital Day: 5) due to Decompensated hepatic cirrhosis.     Interval History    Tbili uptrending  AF, HDS  Palliative to see today    Objective  Temp:  [97.5 °F (36.4 °C)-100.3 °F (37.9 °C)] 98.6 °F (37 °C) (01/08 1148)  Pulse:  [] 104 (01/08 1148)  BP: (118-152)/(60-82) 128/60 (01/08 1148)  Resp:  [17-20] 18 (01/08 1148)  SpO2:  [92 %-95 %] 94 % (01/08 1148)      Laboratory    Lab Results   Component Value Date    WBC 3.73 (L) 01/08/2024    HGB 7.6 (L) 01/08/2024    HCT 24.2 (L) 01/08/2024     (H) 01/08/2024    PLT 42 (L) 01/08/2024       Lab Results   Component Value Date     01/08/2024    K 3.9 01/08/2024     (H) 01/08/2024    CO2 20 (L) 01/08/2024    BUN 8 01/08/2024    CREATININE 0.4 (L) 01/08/2024    CALCIUM 8.7 01/08/2024       Lab Results   Component Value Date    ALBUMIN 1.9 (L) 01/08/2024    ALT 27 01/08/2024    AST 56 (H) 01/08/2024    GGT 33 06/17/2023    ALKPHOS 155 (H) 01/08/2024    BILITOT 17.3 (H) 01/08/2024       Lab Results   Component Value Date    INR 2.0 (H) 01/08/2024    INR 1.8 (H) 01/07/2024    INR 1.8 (H) 01/06/2024       MELD 3.0: 30 at 1/8/2024  6:27 AM  MELD-Na: 25 at 1/8/2024  6:27 AM  Calculated from:  Serum Creatinine: 0.4 mg/dL (Using min of 1 mg/dL) at 1/8/2024  6:27 AM  Serum Sodium: 144 mmol/L (Using max of 137 mmol/L) at 1/8/2024  6:27 AM  Total Bilirubin: 17.3 mg/dL at 1/8/2024  6:27 AM  Serum Albumin: 1.9 g/dL at 1/8/2024  6:27 AM  INR(ratio): 2.0 at 1/8/2024  6:27 AM  Age at listing (hypothetical): 58 years  Sex: Female at 1/8/2024  6:27 AM      Assessment  Marely Hamilton is a 58 y.o. female with history of EtOH cirrhosis, bipolar disorder, DVT with IVC filter in place, prior GI bleeds, ESBL UTI history, and seizure disorder. She presented to the ER from her nursing home for abdominal distension, peripheral edema, and jaundice. Found to have pleural  effusion; thora performed. Has had multiple admissions in the past year for HE, UTI, falls, and GI bleeding (EGD on 7/6/23 showed esophageal ulcer, no varices, non-bleeding gastric ulcer and duodenal ulcer with oozing). Extremely frail and ill appearing on exam. Has been declined for a liver transplant twice - for frailty, non-compliance and comorbidities. S/P thoracentesis on 1/5/23 - 650cc fluid removed. No plans for inpatient liver tx eval given frailty, social situation, and medical/psychiatric comorbidities.       Problem List:  Decompensated cirrhosis 2/2 EtOH use (c/b HE and ascites)  Hx of bleeding gastric and duodenal ulcers  EtOH use disorder  Bipolar disorder  Medication non-compliance    Plan  - Consult palliative care for GOC discussion.   - Added serum LDH and pleural fluid total protein to evaluate pleural effusion  - Obtain liver US w doppler to rule out biliary obstruction. Noted uptrending bilirubin.  - Continue lactulose 15 gm TID & titrate till 3-4 soft BM achieved daily. Continue Xifaxin 550 mg BID  - Continue home psychiatric medications per psych recs  - Avoid sedating drugs like opiates and BZDs, if possible.   - Low Na, high protein diet.   - For frailty: Continue PT, OT, and Nutrition.    - please obtain daily CBC, BMP, LFT, INR  - Plan of care was discussed with primary team    Thank you for involving us in the care of Marely Hamilton. Please call with any additional concerns or questions.    Roberto Faith  Gastroenterology and Hepatology Fellow, PGY-V

## 2024-01-08 NOTE — RESPIRATORY THERAPY
RAPID RESPONSE RESPIRATORY CHART CHECK       Chart check completed.  Please call [phone] for further concerns or assistance.

## 2024-01-08 NOTE — CONSULTS
CONSULTATION LIAISON PSYCHIATRY INITIAL EVALUATION    Patient Name: Marely Hamilton  MRN: [id number]  Patient Class: IP- Inpatient  Admission Date: 1/4/2024  Attending Physician: Kaylee Chavez MD      HPI:   Marely Hamilton is a 58 y.o. female with past psychiatric history of bipolar disorder, alcohol use disorder & past pertinent medical history of alcoholic cirrhosis, chronic anemia, thrombocytopenia, cholelithiasis, DVT with IVC filter in place, prior GI bleeds, ESBL UTI history, seizure disorder presents to the ED/admitted to the hospital for Decompensated hepatic cirrhosis     Per Primary:  In the ED, vitals stable but hypoxic on room air to the 80s, which improved with supplemental oxygen.  Presentation consistent with decompensated liver failure, meld score 22.    She was sent in by her nursing home facility for mild abdominal distention as well as significant jaundice.    Chest x-ray revealed a moderate-to-large pleural effusion with compressive atelectasis. Likely from her ascites. Her abdomen is soft, nontender.       Intake labs remarkable for Na 142, BUN 6, Cr 0.4, Calcium 7.8, , Tbili 12.1, , ALT 41, Ammonia 114, lactic 1.0.      Will admit for decompensated cirrhosis. Continue on lactulose for treatment of HE, rocephin for SBP ppx. Hepatology and IR consulted for thoracentesis and further recommendations.   Patient is oriented to person, place, time, and situation but with some delusions.      Alert however more disoriented today only oriented to self. Oxygen requirements decreased to 2 L after thoracentesis.  Has been incontinent to stool and urine.       She is pleasantly disoriented and talks about calculator and going out to the office and she is currently at the office and overall has delusions/hallucinations.  Confirmed with ancillary staff and they also confirmed that she has this paranoia and does not trust or eat any food here and ask and refuses medications and has to ask where it  came from etc. she is on multiple psychiatric medications and has a history.  We will check with Psychiatry.     Attempted to call the sister however could not reach we will discuss again.       Per goals of care discussion yesterday with patient when she was oriented x2,  She herself thinks she is only okay with invasive lines and pressor support however is not of the favor to have chest compressions, intubation or defibrillation in case of emergency however she will discuss with the sister and is okay to discuss with palliative.  When discussed with sister she had said that she will respect her wishes however she would like to talk to her.     Chart Review:   11/29/23-12/8/23: Admitted from nursing home [facility] with encephalopathy with unwitnessed fall. Psychiatry consulted for recommendations concerning home lithium given patient's presentation with hypercalcemia and supratherapeutic lithium level. Obtained collateral from patient's sister, Zaria, and outpatient psychiatrist, Dr. Coates, on 12/5/23 with both sources confirming that lithium is only medication that works for patient and she has severe mood episodes in absence of lithium. Recommended continuing lithium 300 mg nightly and zyprexa 5 mg nightly.   Notable for albumin corrected calcium elevations on labs intermittently since 2015  PTH has been inappropriately normal or mildly elevated since at least 2020  She had similar labs on admission in May 2023 with calcium acutely elevated and at that time Reclast and calcitonin were given with improvement in serum calcium and actually a slightly low serum calcium when corrected for albumin in the days/weeks following that.  Thoughts were that dehydration and supra-therapeutic lithium levels were the cause.  She was hydrated on that admission and lithium was stopped and patient was started on abilify 5 mg. She was eventually titrated up to 20 mg and then stopped.In June,  lithium 150mg was resumed after  "speaking to patient's outpatient provider who reported that patient has severe episodes of crystal and decompensates rapidly previously after conferring with outpt provider. And she was discharged on Lithium 150mg qhs and Zyprexa 5mg qhs upon discharge to SNF and managed by outpt provider. She was restarted on 300 mg of lithium on 7/28 by the  psychiatry team and continued the zyprexa nightly. The patient was tolerating the lithium well at 300 mg and it seemed to target her mood symptoms but was increased to 600  mg at discharge.     Psychiatry consulted for "extensive psych history, polypharmacy, paranoia & delusions/hallucinations"     Overnight Resident Interview:  On psych exam, patient alert but oriented only to self. Names place as "[facility]", year as 2004. Easily distractible, not attentive to conversation. Interview limited. Patient unwilling/unable to answer when asked about events leading to hospitalization, home medications, support system, etc. States things were going "bad" at nursing home but unwilling/unable to elaborate.      Morning Interview:  On psych exam, patient sitting up in bed, agreeable to interview. Patient with blunted affect, and becomes intermittently unresponsive at times during interview, staring at provider, States she feels "bad," today, though when asked what is bothering her, she responds, "I don't know." She denies any pain. She is oriented to person and January 2024, but is unable to states where she is or what type of building she is in. Reports history of bipolar, though states she has not been taking Lithium. Denies SI/HI/AVH. Otherwise, could not obtain any further meaningful information from patient, so interview terminated. She replies 'no' to all psych ROS questions.     Medical Review of Systems:  Pertinent items are noted in HPI.    Psychiatric Review of Systems (is patient experiencing or having changes in):  sleep: no  appetite: no  weight: no  energy/anergy: " "no  interest/pleasure/anhedonia: no  anxiety/panic: no  guilty/hopelessness: no  concentration: no  Jeana:no  Psychosis: no  Trauma: no  S.I.B.s/risky behavior: no    History obtained via chart review and updated as appropriate.      Past Psychiatric History:  Previous Medication Trials: yes, Abilify, Lithium, Zyprexa  Previous Psychiatric Hospitalizations:yes, multiple   Previous Suicide Attempts: yes  History of Violence: denied  Outpatient Psychiatrist: yes, Dr Coates  Family Psychiatric History: yes, states father w/ unclear history bipolar disorder     Substance Abuse History (with emphasis over the last 12 months):  Recreational Drugs:  denies  Use of Alcohol:  reports history of heavy alcohol consumption with resultant alcohol induced cirrhosis, reports being "clean and sober", denies current consumption    Tobacco Use:no  Rehab History:yes     Social History:  Marital Status: not   Children: 0  Employment Status/Info: on disability, previously worked as    :no  Education: post college graduate work or degree, reports receiving EDIN from [facility] Ed: no  Housing Status: [facility]  Access to gun: no  Psychosocial Stressors: family, health, and drug and alcohol  Functioning Relationships: good support system     Legal History:  Past Charges/Incarcerations: Reports DUI  Pending charges:no    Mental Status Exam:  General Appearance: dressed in hospital garb, in no acute distress, jaundice, thin and gaunt   Behavior: minimal responses, restless and fidgety  Involuntary Movements and Motor Activity: +tremors  Gait and Station: unable to assess - patient lying down or seated  Speech and Language: responds to questions minimally/briefly, poor effort given during testing, paucity of speech   Mood: "bad"  Affect: blunted  Thought Process and Associations: difficult to assess due to poverty of thought  Thought Content and Perceptions:: Denies SI/HI/AVH  Sensorium and " Orientation: alert, oriented to person only  Recent and Remote Memory: Unable to  Formally Assess  Attention and Concentration: inattentive to conversation, easily distractible  Fund of Knowledge: Unable to Formally Assess  Insight: poor awareness of illness  Judgment: impaired due to impaired insight    CAM ICU positive? yes      ASSESSMENT & RECOMMENDATIONS   Delirium  Bipolar 1 Disorder    Unspecified Neurocognitive Disorder     Bipolar 1 Disorder   PSYCH MEDICATIONS  Scheduled   - Continue home Lithium 300 mg nightly   - Continue home Zyprexa 5 mg nightly     Per previous psychiatry consult in December 2023, patient has long history of Bipolar Disorder, effectively treated with Lithium. As such, would be hesitant to adjust Lithium at this time and risk mood disturbance. If concern for polypharmacy, may consider decreasing Zyprexa to 2.5 mg nightly.     DELIRIUM  DELIRIUM BEHAVIOR MANAGEMENT  PLEASE utilize CHEMICAL restraints with PRN meds first for agitation. Minimize use of PHYSICAL restraints OR have periods of being out of physical restraints if possible.  Keep window shades open and room lit during day and room dim at night in order to promote normal sleep-wake cycles  Encourage family at bedside. [place] patient often to situation, location, date.  Continue to Limit or Discontinue use of Narcotics, Benzos and Anti-cholinergic medications as they may worsen delirium.  Continue medical workup for causative etiology of Delirium.       RISK ASSESSMENT  NO NEED FOR PEC patient NOT in any imminent danger of hurting self or others and not gravely disabled.     FOLLOW UP  Will follow up while in house    DISPOSITION - once medically cleared:    Defer to medical team    Please contact ON CALL psychiatry service (24/7) for any acute issues that may arise.    Dr. Cameron VACA Psychiatry  [facility]  1/8/2024 11:23  AM        --------------------------------------------------------------------------------------------------------------------------------------------------------------------------------------------------------------------------------------    CONTINUED HISTORY & OBJECTIVE clinical data & findings reviewed and noted for above decision making    Past Medical/Surgical History:   Past Medical History:   Diagnosis Date    Addiction to drug     Alcohol abuse     Alcohol abuse, in remission 6/15/2015    14.5 weeks ago; AA weekly    Anemia     Anxiety 6/15/2015    Behavioral problem     Bipolar disorder     Bipolar disorder in remission 6/15/2015    Cirrhosis, Laennec's 6/15/2015    Depression     Encounter for blood transfusion     Epistaxis 6/15/2015    Fatigue     Glaucoma     Hematuria     Hepatic encephalopathy 6/15/2015    Hepatic enlargement     History of psychiatric care     History of psychiatric hospitalization     History of seizure 6/15/2015    1    hx of intentional Tylenol overdose 6/15/2015    2005- situational and hx of bipolar    Hx of psychiatric care     Macrocytic anemia 9/18/2015    6 units PRBC since June 2015    Jeana     Osteoarthritis     Other ascites 6/15/2015    Psychiatric exam requested by authority     Psychiatric problem     Psychosis 9/26/2019    Renal disorder     Seizures     Self-harming behavior     Suicide attempt     Therapy     Thrombocytopenia 6/15/2015     Past Surgical History:   Procedure Laterality Date    COSMETIC SURGERY      EMBOLIZATION N/A 6/11/2023    Procedure: EMBOLIZATION, BLOOD VESSEL;  Surgeon: Debbie Surgeon;  Location: [facility];  Service: Anesthesiology;  Laterality: N/A;    ESOPHAGOGASTRODUODENOSCOPY      ESOPHAGOGASTRODUODENOSCOPY N/A 7/6/2023    Procedure: EGD (ESOPHAGOGASTRODUODENOSCOPY);  Surgeon: Bernice Guillen MD;  Location: [facility] ([address] [place] [address]);  Service: Endoscopy;  Laterality: N/A;    ESOPHAGOGASTRODUODENOSCOPY N/A 7/11/2023    Procedure: EGD  (ESOPHAGOGASTRODUODENOSCOPY);  Surgeon: Rangel Broussard MD;  Location: [facility] ([address] [place] [address]);  Service: Endoscopy;  Laterality: N/A;       Current Medications:   Scheduled Meds:    cefTRIAXone (ROCEPHIN) IVPB  2 g Intravenous Q24H    folic acid  1 mg Oral Daily    lactulose  30 g Oral TID    levetiracetam  1,000 mg Oral BID    lithium  300 mg Oral QHS    multivitamin  1 tablet Oral Daily    OLANZapine  5 mg Oral QHS    pantoprazole  40 mg Oral BID    rifAXIMin  550 mg Oral BID    thiamine  100 mg Oral Daily    vitamin D  2,000 Units Oral Daily     PRN Meds: ALPRAZolam, glucagon (human recombinant), glucose, glucose, insulin aspart U-100, sodium chloride 0.9%    Allergies:   Review of patient's allergies indicates:   Allergen Reactions    Sulfa (sulfonamide antibiotics) Rash    Codeine Nausea And Vomiting       Vitals  Vitals:    01/08/24 1115   BP:    Pulse: 102   Resp:    Temp:        Labs/Imaging/Studies:  Recent Results (from the past 24 hour(s))   POCT glucose    Collection Time: 01/07/24 12:23 PM   Result Value Ref Range    POCT Glucose 132 (H) 70 - 110 mg/dL   POCT glucose    Collection Time: 01/07/24  5:06 PM   Result Value Ref Range    POCT Glucose 139 (H) 70 - 110 mg/dL   POCT glucose    Collection Time: 01/07/24 10:57 PM   Result Value Ref Range    POCT Glucose 98 70 - 110 mg/dL   CBC with Automated Differential    Collection Time: 01/08/24  6:26 AM   Result Value Ref Range    WBC 3.73 (L) 3.90 - 12.70 K/uL    RBC 2.02 (L) 4.00 - 5.40 M/uL    Hemoglobin 7.6 (L) 12.0 - 16.0 g/dL    Hematocrit 24.2 (L) 37.0 - 48.5 %     (H) 82 - 98 fL    MCH 37.6 (H) 27.0 - 31.0 pg    MCHC 31.4 (L) 32.0 - 36.0 g/dL    RDW 17.1 (H) 11.5 - 14.5 %    Platelets 42 (L) 150 - 450 K/uL    MPV 10.2 9.2 - 12.9 fL    Immature Granulocytes 0.8 (H) 0.0 - 0.5 %    Gran # (ANC) 2.7 1.8 - 7.7 K/uL    Immature Grans (Abs) 0.03 0.00 - 0.04 K/uL    Lymph # 0.5 (L) 1.0 - 4.8 K/uL    Mono # 0.3 0.3 - 1.0 K/uL    Eos # 0.1 0.0 - 0.5 K/uL     Baso # 0.01 0.00 - 0.20 K/uL    nRBC 0 0 /100 WBC    Gran % 73.4 (H) 38.0 - 73.0 %    Lymph % 12.9 (L) 18.0 - 48.0 %    Mono % 9.1 4.0 - 15.0 %    Eosinophil % 3.5 0.0 - 8.0 %    Basophil % 0.3 0.0 - 1.9 %    Differential Method Automated    Comprehensive Metabolic Panel (CMP)    Collection Time: 01/08/24  6:27 AM   Result Value Ref Range    Sodium 144 136 - 145 mmol/L    Potassium 3.9 3.5 - 5.1 mmol/L    Chloride 120 (H) 95 - 110 mmol/L    CO2 20 (L) 23 - 29 mmol/L    Glucose 109 70 - 110 mg/dL    BUN 8 6 - 20 mg/dL    Creatinine 0.4 (L) 0.5 - 1.4 mg/dL    Calcium 8.7 8.7 - 10.5 mg/dL    Total Protein 4.5 (L) 6.0 - 8.4 g/dL    Albumin 1.9 (L) 3.5 - 5.2 g/dL    Total Bilirubin 17.3 (H) 0.1 - 1.0 mg/dL    Alkaline Phosphatase 155 (H) 55 - 135 U/L    AST 56 (H) 10 - 40 U/L    ALT 27 10 - 44 U/L    eGFR >60.0 >60 mL/min/1.73 m^2    Anion Gap 4 (L) 8 - 16 mmol/L   PT/INR    Collection Time: 01/08/24  6:27 AM   Result Value Ref Range    Prothrombin Time 20.4 (H) 9.0 - 12.5 sec    INR 2.0 (H) 0.8 - 1.2   Magnesium    Collection Time: 01/08/24  6:27 AM   Result Value Ref Range    Magnesium 1.8 1.6 - 2.6 mg/dL   Phosphorus    Collection Time: 01/08/24  6:27 AM   Result Value Ref Range    Phosphorus 2.5 (L) 2.7 - 4.5 mg/dL   POCT glucose    Collection Time: 01/08/24  8:42 AM   Result Value Ref Range    POCT Glucose 91 70 - 110 mg/dL     Imaging Results              X-Ray Chest AP Portable (Final result)  Result time 01/05/24 11:28:08      Final result by Americo Reyes MD (01/05/24 11:28:08)                   Impression:      As above      Electronically signed by: Americo Reyes MD  Date:    01/05/2024  Time:    11:28               Narrative:    EXAMINATION:  XR CHEST AP PORTABLE    CLINICAL HISTORY:  s/p Right thoracentesis. Rule out pneumothorax.;    TECHNIQUE:  One view    COMPARISON:  Comparison is made to 01/05/2024 at 01:07.    FINDINGS:  Opacity in the right hemithorax present on the prior examination is no longer  observed, relating to essentially complete interval removal of pleural fluid from the right hemithorax following thoracentesis.  No post procedure pneumothorax.  No significant detrimental interval change since 01/05/2024 13:07 is appreciated.                                       IR Thoracentesis with Imaging (Final result)  Result time 01/05/24 12:45:31      Final result by Antolin Moses MD (01/05/24 12:45:31)                   Impression:      Successful ultrasound-guided right thoracentesis with drainage of 0.65 liters of serous fluid.    Plan:    Resume care by clinical team.    _______________________________________________________________      Electronically signed by: Antolin Moses MD  Date:    01/05/2024  Time:    12:45               Narrative:    EXAMINATION:  Ultrasound-guided thoracentesis    Procedural Personnel    Attending physician(s): Josué    Fellow physician(s): None    Resident physician(s): None    Advanced practice provider(s): None    Pre-procedure diagnosis: Right pleural effusion    Post-procedure diagnosis: Same    Indication: Shortness of breath    Complications: No immediate complications.    TECHNIQUE:  - Limited thoracic ultrasound    - Ultrasound-guided thoracentesis    FINDINGS:  Pre-procedure    Consent: Informed consent for the procedure was obtained and time-out was performed prior to the procedure.    Preparation: The site was prepared and draped using maximal sterile barrier technique including cutaneous antisepsis.    Anesthesia/sedation    Level of anesthesia/sedation: No sedation    Limited thoracic ultrasound    Limited thoracic ultrasound was performed using a curved transducer. A safe window for thoracentesis was identified.    Right hemithorax findings: Small pleural effusion    Thoracentesis    Local anesthesia was administered. The pleural space was accessed and fluid return confirmed position. The fluid was drained.    Real-time ultrasound guidance used:  "Yes    Catheter placed: 6F [id number]    Closure    The catheter was removed. A sterile bandage was applied.    Post-drainage hemithorax findings: Not performed    Additional Details    Additional description of procedure: None    Equipment details: None    Specimens removed: Pleural fluid    Estimated blood loss (mL): Less than 10    Standardized report: [email or web address]_[email or web address]    Attestation    Signer name: Josué    I attest that I was present for the entire procedure. I reviewed the stored images and agree with the report as written.                                       IR US Abdomen Limited (Final result)  Result time 01/05/24 12:47:45   Procedure changed from IR Paracentesis with Imaging     Final result by Antolin Moses MD (01/05/24 12:47:45)                   Impression:      No significant volume ascites amenable to therapeutic paracentesis.      Electronically signed by: Antolin Moses MD  Date:    01/05/2024  Time:    12:47               Narrative:    EXAMINATION:  IR US ABDOMEN LIMITED    CLINICAL HISTORY:  decompensated cirrhosis;.    TECHNIQUE:  Limited abdominal ultrasound    COMPARISON:  None    FINDINGS:  No significant volume of ascites that would be amenable to therapeutic paracentesis.                                       X-Ray Chest AP Portable (Final result)  Result time 01/05/24 01:28:46      Final result by Marvin Urban MD (01/05/24 01:28:46)                   Impression:      Moderate to large right pleural effusion with passive atelectasis or airspace disease in the right mid and lower lung zone.    Mild enlargement of the cardiomediastinal silhouette.      Electronically signed by: Marvin Urban MD  Date:    01/05/2024  Time:    01:28               Narrative:    EXAMINATION:  XR CHEST AP PORTABLE    CLINICAL HISTORY:  Provided history is "  Hypoxemia".    TECHNIQUE:  One view of the chest.    COMPARISON:  11/29/2023.    FINDINGS:  Cardiac wires overlie the chest.  " Exam quality is limited by suboptimal positioning.  Cardiomediastinal silhouette is magnified by portable technique and is likely mildly enlarged.  There is a moderate to large right pleural effusion with passive atelectasis or airspace disease in the right mid and lower lung zone.  Left lung is grossly clear.  No large left pleural effusion.  No distinct pneumothorax.

## 2024-01-08 NOTE — ASSESSMENT & PLAN NOTE
Impression: Marely Hamilton is a 57 y/o female with multiple hospitalizations over the past year who presents from the NH in which she resides. Pt with history of alcoholic cirrhosis, bipolar disorder, chronic anemia, thrombocytopenia, cholelithiasis, DVT with IVC filter in place, prior GI bleeds, ESBL UTI history, seizure disorder. Per chart review pt has been declined for a liver Tx twice for frailty, non-compliance, an combordidities. This morning she is jaundiced and awake, oriented only to person. She does state that Zaria (sister) is who to contact to make decisions. She does not appear to be in any acute distress and does not show any s/s of pain or labored breathing. She denies pain.     Reason for Consult: Per communication with Dr. Chavez, GOEARNEST and ACP needed.     Advance Care Planning   Code Status  In light of the patients advanced and life limiting illness,I engaged the the family in a voluntary conversation about the patient's preferences for care  at the very end of life. The patient wishes to have a natural, peaceful death.  Along those lines, the patient does not wish to have CPR or other invasive treatments performed when her heart and/or breathing stops. I communicated to the family that a DNR order would be placed in her medical record to reflect this preference.  I spent a total of 25 minutes engaging the patient in this advance care planning discussion.        GOC: As pt was only oriented to self upon my visit this am, I called and spoke with sister Zaria ([phone]) who lives in [facility]. We discussed her advanced liver disease and lack of candidacy for transplant. Sister was surprised to hear this, and I have asked primary team to reach out for prognostication. I did explain liver disease and continued progression without transplant availability. Zaria did hope [place], but we discussed travel is unlikely at this time. Zaria shared that she wishes to  fulfill her sisters wishes in any way possible and therefore agreed to DNR status, per conversation MD had with pt over the weekend. I notified Dr. Chavez of sisters request to place DNR. Zaria has been unable to talk to her sister and we arranged a time to make this phone call that works with her schedule tomorrow morning.     MPOA: Per chart review, pt has requested that Zaria be her decision maker if she could not. Zaria state that she has MPOA, copy to be placed in chart.     Symptoms   Pt denies pain.   No other symptoms noted at this time.     Recommendations  -Primary/hepatology to speak with sister re/ prognosis.  -DNR to be placed by Dr. Chavez.  - I will facilitate phone call w/ Zaria at  tomorrow to provide an opportunity for sisters to talk, if pt able.

## 2024-01-08 NOTE — PLAN OF CARE
Patient AAOx2; disoriented to place and situation. VSS on 2L NC. Afebrile. NSR on Tele. Pt denied pain this shift. IVF d/c'd this am. Right labia swelling +2 and perineum excoriation noted- cream applied. Incontinent x2. Multiple BM noted this shift. Bed bound w/ multiple assist. Bed locked and lowered, call bell in reach, nonskid socks on while in bed. Reviewed plan of care and fall precautions w/ pt- pt verbalized understanding, however requires repeated reinforcing. Reminded to call for assistance. Bed alarm set. Standard precautions maintained. Frequent rounding done. Psych consulted.

## 2024-01-08 NOTE — SUBJECTIVE & OBJECTIVE
Medications:  Continuous Infusions:  Scheduled Meds:   cefTRIAXone (ROCEPHIN) IVPB  2 g Intravenous Q24H    folic acid  1 mg Oral Daily    lactulose  30 g Oral TID    levetiracetam  1,000 mg Oral BID    lithium  300 mg Oral QHS    multivitamin  1 tablet Oral Daily    OLANZapine  5 mg Oral QHS    pantoprazole  40 mg Oral BID    rifAXIMin  550 mg Oral BID    thiamine  100 mg Oral Daily    vitamin D  2,000 Units Oral Daily     PRN Meds:ALPRAZolam, glucagon (human recombinant), glucose, glucose, insulin aspart U-100, sodium chloride 0.9%    Objective:     Vital Signs (Most Recent):  Temp: 98.6 °F (37 °C) (01/08/24 1148)  Pulse: 104 (01/08/24 1148)  Resp: 18 (01/08/24 1148)  BP: 128/60 (01/08/24 1148)  SpO2: (!) 94 % (01/08/24 1148) Vital Signs (24h Range):  Temp:  [97.2 °F (36.2 °C)-100.3 °F (37.9 °C)] 98.6 °F (37 °C)  Pulse:  [] 104  Resp:  [17-20] 18  SpO2:  [92 %-97 %] 94 %  BP: (118-156)/(60-82) 128/60     Weight: 58.1 kg (128 lb 1.4 oz)  Body mass index is 22.69 kg/m².       Physical Exam  Constitutional:       Appearance: She is ill-appearing.   Abdominal:      General: There is distension.   Skin:     Coloration: Skin is jaundiced.   Neurological:      Comments: Awake, oriented to person only.             Review of Symptoms      Symptom Assessment (ESAS 0-10 Scale)  Pain:  0  Dyspnea:  0  Anxiety:  0  Nausea:  0  Depression:  0  Anorexia:  0  Fatigue:  0  Insomnia:  0  Restlessness:  0  Agitation:  0  Unable to complete assessment due to Acuity of condition         Pain Assessment in Advanced Demential Scale (PAINAD)   Breathing - Independent of vocalization:  0  Negative vocalization:  0  Facial expression:  0  Body language:  0  Consolability:  0  Total:  0    Performance Status:  50    Living Arrangements:  Lives in nursing home    Psychosocial/Cultural:   See Palliative Psychosocial Note: Yes  Lives in [facility] after ICU stay in 2023. Sister is decision maker who lives in [facility]    **Primary  to Follow**  Palliative Care  Consult: Yes        Advance Care Planning   Advance Directives:   Living Will: No    LaPOST: No    Do Not Resuscitate Status: Yes (to be filled out today per primary MD.)    Medical Power of : Documentation in chart states that pt wishes her sister Zaria to make decisions..      Decision Making:  Family answered questions and Patient unable to communicate due to disease severity/cognitive impairment  Goals of Care: The family endorses that what is most important right now is to focus on improvement in condition but with limits to invasive therapies    Accordingly, we have decided that the best plan to meet the patient's goals includes continuing with treatment while making pt a DNR also at this time.          Significant Labs: CBC:   Recent Labs   Lab 01/07/24 0635 01/08/24 0626   WBC 4.48 3.73*   HGB 9.1* 7.6*   HCT 27.5* 24.2*   PLT 45* 42*     CMP:   Recent Labs   Lab 01/07/24 0635 01/08/24 0627    144   K 4.4 3.9   * 120*   CO2 23 20*   * 109   BUN 7 8   CREATININE 0.4* 0.4*   CALCIUM 8.6* 8.7   PROT 5.2* 4.5*   ALBUMIN 2.3* 1.9*   BILITOT 18.4* 17.3*   ALKPHOS 196* 155*   AST 71* 56*   ALT 33 27   ANIONGAP 5* 4*     CBC:   Recent Labs   Lab 01/08/24 0626   WBC 3.73*   HGB 7.6*   HCT 24.2*   *   PLT 42*     BMP:  Recent Labs   Lab 01/08/24 0627         K 3.9   *   CO2 20*   BUN 8   CREATININE 0.4*   CALCIUM 8.7   MG 1.8     LFT:  Lab Results   Component Value Date    AST 56 (H) 01/08/2024    GGT 33 06/17/2023    ALKPHOS 155 (H) 01/08/2024    BILITOT 17.3 (H) 01/08/2024     Albumin:   Albumin   Date Value Ref Range Status   01/08/2024 1.9 (L) 3.5 - 5.2 g/dL Final     Protein:   Total Protein   Date Value Ref Range Status   01/08/2024 4.5 (L) 6.0 - 8.4 g/dL Final     Lactic acid:   Lab Results   Component Value Date    LACTATE 1.0 01/05/2024    LACTATE 2.3 (H) 11/29/2023        Significant Imaging: I have reviewed all pertinent imaging results/findings within the past 24 hours.

## 2024-01-09 PROBLEM — J69.0 ASPIRATION PNEUMONIA OF RIGHT LUNG: Status: ACTIVE | Noted: 2023-08-14

## 2024-01-09 PROBLEM — E87.8 ELECTROLYTE ABNORMALITY: Status: RESOLVED | Noted: 2023-05-26 | Resolved: 2024-01-09

## 2024-01-09 PROBLEM — T17.928A ASPIRATION OF FOOD: Status: ACTIVE | Noted: 2024-01-09
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
## 2024-01-09 LAB
ALBUMIN SERPL BCP-MCNC: 1.9 G/DL (ref 3.5–5.2)
ALLENS TEST: ABNORMAL
ALP SERPL-CCNC: 135 U/L (ref 55–135)
ALT SERPL W/O P-5'-P-CCNC: 26 U/L (ref 10–44)
ANION GAP SERPL CALC-SCNC: 6 MMOL/L (ref 8–16)
ANISOCYTOSIS BLD QL SMEAR: SLIGHT
AST SERPL-CCNC: 52 U/L (ref 10–40)
BASOPHILS # BLD AUTO: 0.01 K/UL (ref 0–0.2)
BASOPHILS NFR BLD: 0.2 % (ref 0–1.9)
BILIRUB SERPL-MCNC: 19.3 MG/DL (ref 0.1–1)
BUN SERPL-MCNC: 9 MG/DL (ref 6–20)
BURR CELLS BLD QL SMEAR: ABNORMAL
CALCIUM SERPL-MCNC: 8.8 MG/DL (ref 8.7–10.5)
CHLORIDE SERPL-SCNC: 122 MMOL/L (ref 95–110)
CO2 SERPL-SCNC: 20 MMOL/L (ref 23–29)
CREAT SERPL-MCNC: 0.4 MG/DL (ref 0.5–1.4)
DELSYS: ABNORMAL
DIFFERENTIAL METHOD BLD: ABNORMAL
EOSINOPHIL # BLD AUTO: 0 K/UL (ref 0–0.5)
EOSINOPHIL NFR BLD: 0 % (ref 0–8)
ERYTHROCYTE [DISTWIDTH] IN BLOOD BY AUTOMATED COUNT: 16.7 % (ref 11.5–14.5)
EST. GFR  (NO RACE VARIABLE): >60 ML/MIN/1.73 M^2
FIO2: 36
FLOW: 4
GLUCOSE SERPL-MCNC: 134 MG/DL (ref 70–110)
HCO3 UR-SCNC: 21.6 MMOL/L (ref 24–28)
HCT VFR BLD AUTO: 25.2 % (ref 37–48.5)
HGB BLD-MCNC: 7.8 G/DL (ref 12–16)
IMM GRANULOCYTES # BLD AUTO: 0.01 K/UL (ref 0–0.04)
IMM GRANULOCYTES NFR BLD AUTO: 0.2 % (ref 0–0.5)
INR PPP: 1.9 (ref 0.8–1.2)
LDH SERPL L TO P-CCNC: 351 U/L (ref 110–260)
LYMPHOCYTES # BLD AUTO: 0.2 K/UL (ref 1–4.8)
LYMPHOCYTES NFR BLD: 4 % (ref 18–48)
MAGNESIUM SERPL-MCNC: 1.8 MG/DL (ref 1.6–2.6)
MCH RBC QN AUTO: 37.9 PG (ref 27–31)
MCHC RBC AUTO-ENTMCNC: 31 G/DL (ref 32–36)
MCV RBC AUTO: 122 FL (ref 82–98)
MODE: ABNORMAL
MONOCYTES # BLD AUTO: 0.2 K/UL (ref 0.3–1)
MONOCYTES NFR BLD: 4.2 % (ref 4–15)
NEUTROPHILS # BLD AUTO: 4.1 K/UL (ref 1.8–7.7)
NEUTROPHILS NFR BLD: 91.4 % (ref 38–73)
NRBC BLD-RTO: 0 /100 WBC
PCO2 BLDA: 32.2 MMHG (ref 35–45)
PH SMN: 7.43 [PH] (ref 7.35–7.45)
PHOSPHATE SERPL-MCNC: 3 MG/DL (ref 2.7–4.5)
PLATELET # BLD AUTO: 42 K/UL (ref 150–450)
PLATELET BLD QL SMEAR: ABNORMAL
PMV BLD AUTO: 10.2 FL (ref 9.2–12.9)
PO2 BLDA: 77 MMHG (ref 80–100)
POC BE: -3 MMOL/L
POC SATURATED O2: 96 % (ref 95–100)
POC TCO2: 23 MMOL/L (ref 23–27)
POIKILOCYTOSIS BLD QL SMEAR: SLIGHT
POLYCHROMASIA BLD QL SMEAR: ABNORMAL
POTASSIUM SERPL-SCNC: 3.5 MMOL/L (ref 3.5–5.1)
PROT SERPL-MCNC: 4.5 G/DL (ref 6–8.4)
PROTHROMBIN TIME: 20.1 SEC (ref 9–12.5)
RBC # BLD AUTO: 2.06 M/UL (ref 4–5.4)
SAMPLE: ABNORMAL
SITE: ABNORMAL
SODIUM SERPL-SCNC: 148 MMOL/L (ref 136–145)
WBC # BLD AUTO: 4.53 K/UL (ref 3.9–12.7)

## 2024-01-09 PROCEDURE — 85610 PROTHROMBIN TIME: CPT | Performed by: STUDENT IN AN ORGANIZED HEALTH CARE EDUCATION/TRAINING PROGRAM

## 2024-01-09 PROCEDURE — 80177 DRUG SCRN QUAN LEVETIRACETAM: CPT | Performed by: STUDENT IN AN ORGANIZED HEALTH CARE EDUCATION/TRAINING PROGRAM

## 2024-01-09 PROCEDURE — 84100 ASSAY OF PHOSPHORUS: CPT | Performed by: STUDENT IN AN ORGANIZED HEALTH CARE EDUCATION/TRAINING PROGRAM

## 2024-01-09 PROCEDURE — 85025 COMPLETE CBC W/AUTO DIFF WBC: CPT | Performed by: STUDENT IN AN ORGANIZED HEALTH CARE EDUCATION/TRAINING PROGRAM

## 2024-01-09 PROCEDURE — 63600175 PHARM REV CODE 636 W HCPCS: Performed by: STUDENT IN AN ORGANIZED HEALTH CARE EDUCATION/TRAINING PROGRAM

## 2024-01-09 PROCEDURE — 97535 SELF CARE MNGMENT TRAINING: CPT

## 2024-01-09 PROCEDURE — 80053 COMPREHEN METABOLIC PANEL: CPT | Performed by: STUDENT IN AN ORGANIZED HEALTH CARE EDUCATION/TRAINING PROGRAM

## 2024-01-09 PROCEDURE — 63600175 PHARM REV CODE 636 W HCPCS: Performed by: HOSPITALIST

## 2024-01-09 PROCEDURE — 94761 N-INVAS EAR/PLS OXIMETRY MLT: CPT | Mod: XB

## 2024-01-09 PROCEDURE — 99223 1ST HOSP IP/OBS HIGH 75: CPT | Mod: GC,,, | Performed by: PSYCHIATRY & NEUROLOGY

## 2024-01-09 PROCEDURE — 36600 WITHDRAWAL OF ARTERIAL BLOOD: CPT

## 2024-01-09 PROCEDURE — 25000003 PHARM REV CODE 250: Performed by: STUDENT IN AN ORGANIZED HEALTH CARE EDUCATION/TRAINING PROGRAM

## 2024-01-09 PROCEDURE — 97530 THERAPEUTIC ACTIVITIES: CPT

## 2024-01-09 PROCEDURE — 99233 SBSQ HOSP IP/OBS HIGH 50: CPT | Mod: ,,,

## 2024-01-09 PROCEDURE — 84630 ASSAY OF ZINC: CPT | Performed by: STUDENT IN AN ORGANIZED HEALTH CARE EDUCATION/TRAINING PROGRAM

## 2024-01-09 PROCEDURE — 27000646 HC AEROBIKA DEVICE

## 2024-01-09 PROCEDURE — 83615 LACTATE (LD) (LDH) ENZYME: CPT | Performed by: STUDENT IN AN ORGANIZED HEALTH CARE EDUCATION/TRAINING PROGRAM

## 2024-01-09 PROCEDURE — 27000221 HC OXYGEN, UP TO 24 HOURS

## 2024-01-09 PROCEDURE — 20600001 HC STEP DOWN PRIVATE ROOM

## 2024-01-09 PROCEDURE — 82803 BLOOD GASES ANY COMBINATION: CPT

## 2024-01-09 PROCEDURE — 94664 DEMO&/EVAL PT USE INHALER: CPT

## 2024-01-09 PROCEDURE — 99900035 HC TECH TIME PER 15 MIN (STAT)

## 2024-01-09 PROCEDURE — 82525 ASSAY OF COPPER: CPT | Performed by: STUDENT IN AN ORGANIZED HEALTH CARE EDUCATION/TRAINING PROGRAM

## 2024-01-09 PROCEDURE — 83735 ASSAY OF MAGNESIUM: CPT | Performed by: STUDENT IN AN ORGANIZED HEALTH CARE EDUCATION/TRAINING PROGRAM

## 2024-01-09 PROCEDURE — 92610 EVALUATE SWALLOWING FUNCTION: CPT

## 2024-01-09 RX ORDER — FUROSEMIDE 10 MG/ML
20 INJECTION INTRAMUSCULAR; INTRAVENOUS ONCE
Status: COMPLETED | OUTPATIENT
Start: 2024-01-09 | End: 2024-01-09

## 2024-01-09 RX ORDER — ONDANSETRON HYDROCHLORIDE 2 MG/ML
4 INJECTION, SOLUTION INTRAVENOUS EVERY 6 HOURS PRN
Status: DISCONTINUED | OUTPATIENT
Start: 2024-01-09 | End: 2024-01-18 | Stop reason: HOSPADM

## 2024-01-09 RX ORDER — PROCHLORPERAZINE EDISYLATE 5 MG/ML
5 INJECTION INTRAMUSCULAR; INTRAVENOUS EVERY 6 HOURS PRN
Status: DISCONTINUED | OUTPATIENT
Start: 2024-01-09 | End: 2024-01-18 | Stop reason: HOSPADM

## 2024-01-09 RX ORDER — LACTULOSE 10 G/15ML
20 SOLUTION ORAL 3 TIMES DAILY
Status: DISCONTINUED | OUTPATIENT
Start: 2024-01-09 | End: 2024-01-18 | Stop reason: HOSPADM

## 2024-01-09 RX ORDER — FUROSEMIDE 20 MG/1
20 TABLET ORAL DAILY
Status: DISCONTINUED | OUTPATIENT
Start: 2024-01-10 | End: 2024-01-10

## 2024-01-09 RX ADMIN — FOLIC ACID 1 MG: 1 TABLET ORAL at 08:01

## 2024-01-09 RX ADMIN — LITHIUM CARBONATE 300 MG: 300 TABLET, FILM COATED, EXTENDED RELEASE ORAL at 08:01

## 2024-01-09 RX ADMIN — LEVETIRACETAM 1000 MG: 100 SOLUTION ORAL at 08:01

## 2024-01-09 RX ADMIN — THERA TABS 1 TABLET: TAB at 08:01

## 2024-01-09 RX ADMIN — PANTOPRAZOLE SODIUM 40 MG: 40 GRANULE, DELAYED RELEASE ORAL at 08:01

## 2024-01-09 RX ADMIN — Medication 100 MG: at 08:01

## 2024-01-09 RX ADMIN — OLANZAPINE 2.5 MG: 2.5 TABLET, FILM COATED ORAL at 08:01

## 2024-01-09 RX ADMIN — LACTULOSE 20 G: 20 SOLUTION ORAL at 08:01

## 2024-01-09 RX ADMIN — CEFTRIAXONE 2 G: 2 INJECTION, POWDER, FOR SOLUTION INTRAMUSCULAR; INTRAVENOUS at 05:01

## 2024-01-09 RX ADMIN — RIFAXIMIN 550 MG: 550 TABLET ORAL at 08:01

## 2024-01-09 RX ADMIN — PIPERACILLIN SODIUM AND TAZOBACTAM SODIUM 4.5 G: 4; .5 INJECTION, POWDER, FOR SOLUTION INTRAVENOUS at 04:01

## 2024-01-09 RX ADMIN — LACTULOSE 20 G: 20 SOLUTION ORAL at 03:01

## 2024-01-09 RX ADMIN — PIPERACILLIN SODIUM AND TAZOBACTAM SODIUM 4.5 G: 4; .5 INJECTION, POWDER, FOR SOLUTION INTRAVENOUS at 09:01

## 2024-01-09 RX ADMIN — CHOLECALCIFEROL TAB 25 MCG (1000 UNIT) 2000 UNITS: 25 TAB at 08:01

## 2024-01-09 RX ADMIN — ONDANSETRON 4 MG: 2 INJECTION INTRAMUSCULAR; INTRAVENOUS at 01:01

## 2024-01-09 RX ADMIN — FUROSEMIDE 20 MG: 10 INJECTION, SOLUTION INTRAMUSCULAR; INTRAVENOUS at 08:01

## 2024-01-09 RX ADMIN — LACTULOSE 30 G: 20 SOLUTION ORAL at 08:01

## 2024-01-09 NOTE — SUBJECTIVE & OBJECTIVE
Interval History:   Alert however more disoriented today only oriented to self.  She thought I was her sister suzy and she was at [employer].     She had aspiration even overnight and briefly required increased oxygent. Given IV lasix and breathing treatments. Abx excalated to Zosyn.     Discussed with sister again today and she is rather very optimistic that she may turn around. She is coming on Saturday.     Review of Systems   Unable to perform ROS: Mental status change   Constitutional:  Positive for appetite change. Negative for activity change and chills.   HENT:          Desquamation of lips   Respiratory:          Nasal canula in place   Gastrointestinal:         Incontinence of stools   Genitourinary:         Urinary incontinence   Skin:  Positive for color change.   Neurological:  Positive for tremors. Negative for speech difficulty.   Psychiatric/Behavioral:  Positive for confusion.      Objective:     Vital Signs (Most Recent):  Temp: 99.7 °F (37.6 °C) (01/09/24 1220)  Pulse: 90 (01/09/24 1220)  Resp: 16 (01/09/24 1220)  BP: (!) 144/66 (01/09/24 1220)  SpO2: (!) 94 % (01/09/24 1220) Vital Signs (24h Range):  Temp:  [97.5 °F (36.4 °C)-100.3 °F (37.9 °C)] 99.7 °F (37.6 °C)  Pulse:  [] 90  Resp:  [14-18] 16  SpO2:  [88 %-99 %] 94 %  BP: (116-176)/(57-80) 144/66     Body mass index is 21.95 kg/m².    Physical Exam  Vitals reviewed.   Constitutional:       General: She is not in acute distress.     Appearance: She is cachectic. She is ill-appearing.   HENT:      Head: Normocephalic and atraumatic.      Mouth/Throat:      Mouth: Mucous membranes are dry.      Pharynx: No oropharyngeal exudate.      Comments: Desquamation of lips  Eyes:      General: Scleral icterus present.      Extraocular Movements: Extraocular movements intact.   Cardiovascular:      Rate and Rhythm: Normal rate and regular rhythm.      Heart sounds: Normal heart sounds. No murmur heard.  Pulmonary:      Effort: Pulmonary effort is  normal. No respiratory distress.      Breath sounds: No wheezing.   Abdominal:      General: Bowel sounds are normal. There is no distension.      Palpations: Abdomen is soft.      Tenderness: There is no abdominal tenderness. There is no guarding.   Musculoskeletal:         General: No tenderness. Normal range of motion.      Cervical back: Normal range of motion and neck supple.   Lymphadenopathy:      Cervical: No cervical adenopathy.   Skin:     General: Skin is warm and dry.      Capillary Refill: Capillary refill takes less than 2 seconds.      Coloration: Skin is jaundiced.      Findings: No rash.   Neurological:      Mental Status: She is alert. She is disoriented.      Motor: Weakness: bilateral lower extremities.      Comments: Poor participation   Psychiatric:         Attention and Perception: She perceives visual hallucinations.         Behavior: Behavior is cooperative.         Thought Content: Thought content is delusional.        Significant Labs: All pertinent labs within the past 24 hours have been reviewed.  Significant Imaging: I have reviewed all pertinent imaging results/findings within the past 24 hours.

## 2024-01-09 NOTE — NURSING
Pt with nausea and vomiting. Vomited x 1 consistent with pudding that she had earlier with her night time meds. Dr. Anderson made aware. PRN antiemetics ordered - zofran administered.     Dr. Anderson to bedside to assess. One time dose 20mg lasix IVP ordered for 9am. CPT q6h ordered. IVF d/c.

## 2024-01-09 NOTE — PLAN OF CARE
"- Admitted 1/4 from care facility with decompensated cirrhosis. Per notes, pt is not a candidate for liver transplant.   - Pt's mentation waxing and waning overnight. During initial assessment last night, pt only able to state her name. A few hours later, pt was able to state her name, state that she was in a hospital in [place], and correctly identified the year and current [place] president. This morning pt will only state "no" to all questions.   - VSS with T max 99.3.   - O2 demands increased O/N, refer to previous notes. ABG and chest x-ray completed. One time dose lasix ordered for 0900. Maintaining > 90% on 3L.   - New consults placed to SLP overnight after coughing assessed following administration of PO meds. Diet changed to minced and moist. Aspiration precautions followed. Refer to previous notes.   - Palliative, hepatology, psychiatry, wound care following. Pt's code status updated to DNR. Plan for another meeting with palliative care this morning, per notes.   - Liver U/S completed yesterday per hepatology recommendation.   - Remains incontinent of stool and urine. Refer to flowsheets for output totals.   - N/V x 1 O/N improved with PRN zofran.   - Sacrum remains reddened, barrier cream applied.   - Bed alarm set, frequent turning and weight repositioning, room near station.   "

## 2024-01-09 NOTE — SUBJECTIVE & OBJECTIVE
Medications:  Continuous Infusions:  Scheduled Meds:   folic acid  1 mg Oral Daily    lactulose  30 g Oral TID    levetiracetam  1,000 mg Oral BID    lithium  300 mg Oral QHS    multivitamin  1 tablet Oral Daily    OLANZapine  2.5 mg Oral QHS    pantoprazole  40 mg Oral BID    piperacillin-tazobactam (Zosyn) IV (PEDS and ADULTS) (extended infusion is not appropriate)  4.5 g Intravenous Q8H    rifAXIMin  550 mg Oral BID    thiamine  100 mg Oral Daily    vitamin D  2,000 Units Oral Daily     PRN Meds:ALPRAZolam, glucagon (human recombinant), glucose, glucose, insulin aspart U-100, ondansetron, prochlorperazine, sodium chloride 0.9%    Objective:     Vital Signs (Most Recent):  Temp: 99.1 °F (37.3 °C) (01/09/24 0738)  Pulse: 97 (01/09/24 0738)  Resp: 14 (01/09/24 0738)  BP: (!) 120/58 (01/09/24 0738)  SpO2: 95 % (01/09/24 0738) Vital Signs (24h Range):  Temp:  [97.5 °F (36.4 °C)-100.3 °F (37.9 °C)] 99.1 °F (37.3 °C)  Pulse:  [] 97  Resp:  [14-18] 14  SpO2:  [88 %-99 %] 95 %  BP: (116-176)/(57-80) 120/58     Weight: 56.2 kg (123 lb 14.4 oz)  Body mass index is 21.95 kg/m².       Physical Exam  Constitutional:       Appearance: She is ill-appearing.   Abdominal:      General: There is distension.   Skin:     Coloration: Skin is jaundiced.   Neurological:      Comments: Awake, oriented to person only.             Review of Symptoms      Symptom Assessment (ESAS 0-10 Scale)  Pain:  0  Dyspnea:  0  Anxiety:  0  Nausea:  0  Depression:  0  Anorexia:  0  Fatigue:  0  Insomnia:  0  Restlessness:  0  Agitation:  0 due to Acuity of condition         Pain Assessment in Advanced Demential Scale (PAINAD)   Breathing - Independent of vocalization:  0  Negative vocalization:  0  Facial expression:  0  Body language:  0  Consolability:  0  Total:  0    Performance Status:  50    Living Arrangements:  Lives in nursing home    Psychosocial/Cultural:   See Palliative Psychosocial Note: Yes  Lives in [facility] after ICU stay in  2023. Sister is decision maker who lives in [facility].   **Primary  to Follow**  Palliative Care  Consult: Yes        Advance Care Planning   Advance Directives:   Living Will: No    LaPOST: No    Do Not Resuscitate Status: Yes    Medical Power of : Documentation in chart states that pt wishes her sister Zaria to make decisions..      Decision Making:  Family answered questions and Patient unable to communicate due to disease severity/cognitive impairment  Goals of Care: What is most important right now is to focus on improvement in condition but with limits to invasive therapies. Accordingly, we have decided that the best plan to meet the patient's goals includes continuing with treatment.         Significant Labs: CBC:   Recent Labs   Lab 01/08/24  0626 01/09/24  0652   WBC 3.73* 4.53   HGB 7.6* 7.8*   HCT 24.2* 25.2*   PLT 42* 42*       CMP:   Recent Labs   Lab 01/08/24  0627 01/09/24  0652    148*   K 3.9 3.5   * 122*   CO2 20* 20*    134*   BUN 8 9   CREATININE 0.4* 0.4*   CALCIUM 8.7 8.8   PROT 4.5* 4.5*   ALBUMIN 1.9* 1.9*   BILITOT 17.3* 19.3*   ALKPHOS 155* 135   AST 56* 52*   ALT 27 26   ANIONGAP 4* 6*       CBC:   Recent Labs   Lab 01/09/24 0652   WBC 4.53   HGB 7.8*   HCT 25.2*   *   PLT 42*       BMP:  Recent Labs   Lab 01/09/24  0652   *   *   K 3.5   *   CO2 20*   BUN 9   CREATININE 0.4*   CALCIUM 8.8   MG 1.8       LFT:  Lab Results   Component Value Date    AST 52 (H) 01/09/2024    GGT 33 06/17/2023    ALKPHOS 135 01/09/2024    BILITOT 19.3 (H) 01/09/2024     Albumin:   Albumin   Date Value Ref Range Status   01/09/2024 1.9 (L) 3.5 - 5.2 g/dL Final     Protein:   Total Protein   Date Value Ref Range Status   01/09/2024 4.5 (L) 6.0 - 8.4 g/dL Final     Lactic acid:   Lab Results   Component Value Date    LACTATE 1.0 01/05/2024    LACTATE 2.3 (H) 11/29/2023       Significant Imaging: I have reviewed all pertinent  imaging results/findings within the past 24 hours.

## 2024-01-09 NOTE — ASSESSMENT & PLAN NOTE
Continue home regimen: lithium, olanzapine  -lithium level pending  -alert however some delusions when asked why she's not on her lactulose.  -nurses also confirmed that she is paranoid about her medications and diet    Appreciate psychiatry recommendations we will decrease Zyprexa.

## 2024-01-09 NOTE — ASSESSMENT & PLAN NOTE
Patient's anemia is currently controlled. Has not received any PRBCs to date. Etiology likely d/t chronic disease due to Chronic liver disease  Current CBC reviewed-   Lab Results   Component Value Date    HGB 7.6 (L) 01/08/2024    HCT 24.2 (L) 01/08/2024     Monitor serial CBC and transfuse if patient becomes hemodynamically unstable, symptomatic or H/H drops below 7.    She is on chronic thiamine and folate.  We will add multivitamin

## 2024-01-09 NOTE — ASSESSMENT & PLAN NOTE
"Impression: Marely Hamilton is a 57 y/o female with multiple hospitalizations over the past year who presents from the NH in which she resides. Pt with history of alcoholic cirrhosis, bipolar disorder, chronic anemia, thrombocytopenia, cholelithiasis, DVT with IVC filter in place, prior GI bleeds, ESBL UTI history, seizure disorder. Per chart review pt has been declined for a liver Tx twice for frailty, non-compliance, an combordidities. This morning she is jaundiced and awake, oriented only to person. She does state that Zaria (sister) is who to contact to make decisions. She does not appear to be in any acute distress and does not show any s/s of pain or labored breathing. She denies pain.     Reason for Consult: Per communication with Dr. Chavez, GO and ACP needed.     Advance Care Planning   Code Status  In light of the patients advanced and life limiting illness,I engaged the the family in a voluntary conversation about the patient's preferences for care  at the very end of life. The patient wishes to have a natural, peaceful death.  Along those lines, the patient does not wish to have CPR or other invasive treatments performed when her heart and/or breathing stops. I communicated to the family that a DNR order would be placed in her medical record to reflect this preference.  I spent a total of 25 minutes engaging the patient in this advance care planning discussion.        GOC:1/9/24: Per my conversation/plan with sister ,Zaria, yesterday, I called her from the bedside this am with pt. present so that we could all be present for conversation. This morning pt is awake and oriented to person and intermittently oriented to time and place throughout our conversation. Zaria stressed to pt that she wanted to follow her wishes and asked pt if she was ok with DNR status. Pt replied "yes".   We discussed that pt continues to have intermittent periods of orientation, including during my time this morning. I " "reiterated to pt and sister that there are no options left for treatment of liver cirrhosis, which will continue to to manifest as progressive decline, leading to end of life. Dr. Chavez also spoke with sister yesterday explaining the same. Sister remains hopeful that pt will "pull thru again, like she has so many times before". Sister understands that pt is not able to move to [place] to live near her and is looking into other placement, as pt did not like [facility]. Sister is planning to visit this weekend to make further arrangements.     1/8/24: As pt was only oriented to self upon my visit this am, I called and spoke with sister Zaria ([phone]) who lives in [facility]. We discussed her advanced liver disease and lack of candidacy for transplant. Sister was surprised to hear this, and I have asked primary team to reach out for prognostication. I did explain liver disease and continued progression without transplant availability. Zaria did hope to get pt to .. nearer to her, but we discussed travel is unlikely at this time. Zaria shared that she wishes to fulfill her sisters wishes in any way possible and therefore agreed to DNR status, per conversation MD had with pt over the weekend. I notified Dr. Chavez of sisters request to place DNR. Zaria has been unable to talk to her sister and we arranged a time to make this phone call that works with her schedule tomorrow morning.     MPOA: Per chart review, pt has requested that Zaria be her decision maker if she could not. Zaria state that she has MPOA, copy to be placed in chart.     Symptoms   Pt denies pain.   No other symptoms noted at this time.     Recommendations  -Continue to monitor for s/s of pain/ discomfort  -Will ask Derrell IBARRA here this weekend to reach out to sister in person, as she may understand better upon seeing frailty and decline in pt.  "

## 2024-01-09 NOTE — SUBJECTIVE & OBJECTIVE
Interval History:   Alert however more disoriented today only oriented to self.  Stable oxygen requirements decreased to 2 L after thoracentesis.  Has been incontinent to stool and urine.      Evaluated by psychiatry and agree with plan and continuation of the same medicines.  Decrease Zyprexa to 2.5 mg nightly.    Palliative Care had a discussion with sister Zaria and she agreed to DNR status however continued to treatment.  Another meeting planned for tomorrow morning.  We will change code status    Paracentesis were attempted however for paracentesis.    Review of Systems   Unable to perform ROS: Mental status change   Constitutional:  Positive for appetite change. Negative for activity change and chills.   HENT:          Desquamation of lips   Respiratory:          Nasal canula in place   Gastrointestinal:         Incontinence of stools   Genitourinary:         Urinary incontinence   Skin:  Positive for color change.   Neurological:  Positive for tremors. Negative for speech difficulty.   Psychiatric/Behavioral:  Positive for confusion.      Objective:     Vital Signs (Most Recent):  Temp: 100.3 °F (37.9 °C) (01/08/24 1548)  Pulse: 109 (01/08/24 1548)  Resp: 18 (01/08/24 1548)  BP: 130/62 (01/08/24 1548)  SpO2: 95 % (01/08/24 1548) Vital Signs (24h Range):  Temp:  [97.5 °F (36.4 °C)-100.3 °F (37.9 °C)] 100.3 °F (37.9 °C)  Pulse:  [] 109  Resp:  [18-20] 18  SpO2:  [93 %-95 %] 95 %  BP: (118-131)/(60-82) 130/62     Body mass index is 22.69 kg/m².    Physical Exam  Vitals reviewed.   Constitutional:       General: She is not in acute distress.     Appearance: She is cachectic. She is ill-appearing.   HENT:      Head: Normocephalic and atraumatic.      Mouth/Throat:      Mouth: Mucous membranes are dry.      Pharynx: No oropharyngeal exudate.      Comments: Desquamation of lips  Eyes:      General: Scleral icterus present.      Extraocular Movements: Extraocular movements intact.   Cardiovascular:      Rate  and Rhythm: Normal rate and regular rhythm.      Heart sounds: Normal heart sounds. No murmur heard.  Pulmonary:      Effort: Pulmonary effort is normal. No respiratory distress.      Breath sounds: No wheezing.   Abdominal:      General: Bowel sounds are normal. There is no distension.      Palpations: Abdomen is soft.      Tenderness: There is no abdominal tenderness. There is no guarding.   Musculoskeletal:         General: No tenderness. Normal range of motion.      Cervical back: Normal range of motion and neck supple.   Lymphadenopathy:      Cervical: No cervical adenopathy.   Skin:     General: Skin is warm and dry.      Capillary Refill: Capillary refill takes less than 2 seconds.      Coloration: Skin is jaundiced.      Findings: No rash.   Neurological:      Mental Status: She is alert. She is disoriented.      Motor: Weakness: bilateral lower extremities.      Comments: Poor participation   Psychiatric:         Attention and Perception: She perceives visual hallucinations.         Behavior: Behavior is cooperative.         Thought Content: Thought content is delusional.        Significant Labs: All pertinent labs within the past 24 hours have been reviewed.  Significant Imaging: I have reviewed all pertinent imaging results/findings within the past 24 hours.

## 2024-01-09 NOTE — NURSING
Pt took her meds crushed in pudding, but is now having a nonproductive cough. She did not have the cough tonight until after she took her meds and ate the pudding. She was saturating 90% on 0.5L before taking her medication. Saturation increased immediately to 92% when O2 supply increased to 1.5L via NC. Currently saturating 94% on 1.5L. Dr. Anderson made aware of situation. Diet modified to minced and moist low sodium and thin with 1.5L FR. SLP consulted. Aspiration precautions ordered.

## 2024-01-09 NOTE — HOSPITAL COURSE
Palliative care has been helping coordinate with sister. She is now DNR/DNI.     Follow nutrition goals and recommendations however follow aspiration precautions.     Appreciate psychiatry recommendations. Continue lithium and and decreased zyprexa.     Will discuss with neurology for her Keppra.     Escalated antibiotic coverage from rocephin to zosyn due to aspiration overnight on 1/9/24. Diet changed to pureed.     Jose evaluating EEG to r/o seizures, asked if can wean keppra. Keppra level is pending.

## 2024-01-09 NOTE — PROGRESS NOTES
[facility] - Transplant Stepdown  Palliative Medicine  Progress Note    Patient Name: Marely Hamilton  MRN: [id number]  Admission Date: 1/4/2024  Hospital Length of Stay: 4 days  Code Status: DNR   Attending Provider: Kaylee Chavez MD  Consulting Provider: MIKA Ponce  Primary Care Physician: Viktor Ross MD  Principal Problem:Decompensated hepatic cirrhosis    Patient information was obtained from relative(s) and primary team.      Assessment/Plan:     Palliative Care  Palliative care encounter  Impression: Marely Hamilton is a 59 y/o female with multiple hospitalizations over the past year who presents from the NH in which she resides. Pt with history of alcoholic cirrhosis, bipolar disorder, chronic anemia, thrombocytopenia, cholelithiasis, DVT with IVC filter in place, prior GI bleeds, ESBL UTI history, seizure disorder. Per chart review pt has been declined for a liver Tx twice for frailty, non-compliance, an combordidities. This morning she is jaundiced and awake, oriented only to person. She does state that Zaria (sister) is who to contact to make decisions. She does not appear to be in any acute distress and does not show any s/s of pain or labored breathing. She denies pain.     Reason for Consult: Per communication with Dr. Chavez, [facility] and ACP needed.     Advance Care Planning  Code Status  In light of the patients advanced and life limiting illness,I engaged the the family in a voluntary conversation about the patient's preferences for care  at the very end of life. The patient wishes to have a natural, peaceful death.  Along those lines, the patient does not wish to have CPR or other invasive treatments performed when her heart and/or breathing stops. I communicated to the family that a DNR order would be placed in her medical record to reflect this preference.  I spent a total of 25 minutes engaging the patient in this advance care planning discussion.        GOC:1/9/24: Per my conversation/plan  "with sister ,Zaria, yesterday, I called her from the bedside this am with pt. present so that we could all be present for conversation. This morning pt is awake and oriented to person and intermittently oriented to time and place throughout our conversation. Zaria stressed to pt that she wanted to follow her wishes and asked pt if she was ok with DNR status. Pt replied "yes".   We discussed that pt continues to have intermittent periods of orientation, including during my time this morning. I reiterated to pt and sister that there are no options left for treatment of liver cirrhosis, which will continue to to manifest as progressive decline, leading to end of life. Dr. Chavez also spoke with sister yesterday explaining the same. Sister remains hopeful that pt will "pull thru again, like she has so many times before". Sister understands that pt is not able to move to [place] to live near her and is looking into other placement, as pt did not like [facility]. Sister is planning to visit this weekend to make further arrangements.     1/8/24: As pt was only oriented to self upon my visit this am, I called and spoke with sister Zaria ([phone]) who lives in [facility]. We discussed her advanced liver disease and lack of candidacy for transplant. Sister was surprised to hear this, and I have asked primary team to reach out for prognostication. I did explain liver disease and continued progression without transplant availability. Zraia did hope to get pt to [place].. nearer to her, but we discussed travel is unlikely at this time. Zaria shared that she wishes to fulfill her sisters wishes in any way possible and therefore agreed to DNR status, per conversation MD had with pt over the weekend. I notified Dr. Chavez of sisters request to place DNR. Zaria has been unable to talk to her sister and we arranged a time to make this phone call that works with her schedule tomorrow morning.     MPOA: Per chart review, " pt has requested that Zaria be her decision maker if she could not. Zaria state that she has MPOA, copy to be placed in chart.     Symptoms   Pt denies pain.   No other symptoms noted at this time.     Recommendations  -Continue to monitor for s/s of pain/ discomfort  -Will ask Derrell IBARRA here this weekend to reach out to sister in person, as she may understand better upon seeing frailty and decline in pt.        I will follow-up with patient. Please contact us if you have any additional questions.    Subjective:     Chief Complaint:   Chief Complaint   Patient presents with    Ascites     From [facility] for abd distention, BLE swelling, and jaudice. Hx of cirrhosis.        HPI:   HPI: Per Chart Review: Patient is a 58-year-old woman with past medical history significant for alcoholic cirrhosis, bipolar disorder, chronic anemia, thrombocytopenia, cholelithiasis, DVT with IVC filter in place, prior GI bleeds, ESBL UTI history, seizure disorder presenting from nursing [facility] for evaluation of abdominal distension, peripheral edema, and jaundice.      Patient denies fever, chills, or abdominal pain. Patient was recently admitted in early December for hepatic encephalopathy.   Patient denies any pain at this time. She reports she has been urinating frequently though difficult to obtain history.   She denies recent illness/fever/chills/nausea/vomiting/diarrhea/constipation.  Patient has not been taking lactulose. Reports compliance with psychiatric medications and A&Ox3 but reports she can't take the lactulose due to not tolerating the sun.    Hospital Course:   In the ED, vitals stable but hypoxic on room air to the 80s, which improved with supplemental oxygen.  Presentation consistent with decompensated liver failure, meld score 22.    She was sent in by her nursing home facility for mild abdominal distention as well as significant jaundice.    Chest x-ray revealed a moderate-to-large pleural  effusion with compressive atelectasis. Likely from her ascites. Her abdomen is soft, nontender.       Intake labs remarkable for Na 142, BUN 6, Cr 0.4, Calcium 7.8, , Tbili 12.1, , ALT 41, Ammonia 114, lactic 1.0.      Will admit for decompensated cirrhosis. Continue on lactulose for treatment of HE, rocephin for SBP ppx. Hepatology and IR consulted for thoracentesis and further recommendations.   Patient is oriented to person, place, time, and situation but with some delusions.        Hospital Course:  No notes on file      Medications:  Continuous Infusions:  Scheduled Meds:   folic acid  1 mg Oral Daily    lactulose  30 g Oral TID    levetiracetam  1,000 mg Oral BID    lithium  300 mg Oral QHS    multivitamin  1 tablet Oral Daily    OLANZapine  2.5 mg Oral QHS    pantoprazole  40 mg Oral BID    piperacillin-tazobactam (Zosyn) IV (PEDS and ADULTS) (extended infusion is not appropriate)  4.5 g Intravenous Q8H    rifAXIMin  550 mg Oral BID    thiamine  100 mg Oral Daily    vitamin D  2,000 Units Oral Daily     PRN Meds:ALPRAZolam, glucagon (human recombinant), glucose, glucose, insulin aspart U-100, ondansetron, prochlorperazine, sodium chloride 0.9%    Objective:     Vital Signs (Most Recent):  Temp: 99.1 °F (37.3 °C) (01/09/24 0738)  Pulse: 97 (01/09/24 0738)  Resp: 14 (01/09/24 0738)  BP: (!) 120/58 (01/09/24 0738)  SpO2: 95 % (01/09/24 0738) Vital Signs (24h Range):  Temp:  [97.5 °F (36.4 °C)-100.3 °F (37.9 °C)] 99.1 °F (37.3 °C)  Pulse:  [] 97  Resp:  [14-18] 14  SpO2:  [88 %-99 %] 95 %  BP: (116-176)/(57-80) 120/58     Weight: 56.2 kg (123 lb 14.4 oz)  Body mass index is 21.95 kg/m².       Physical Exam  Constitutional:       Appearance: She is ill-appearing.   Abdominal:      General: There is distension.   Skin:     Coloration: Skin is jaundiced.   Neurological:      Comments: Awake, oriented to person only.             Review of Symptoms      Symptom Assessment (ESAS 0-10 Scale)  Pain:   0  Dyspnea:  0  Anxiety:  0  Nausea:  0  Depression:  0  Anorexia:  0  Fatigue:  0  Insomnia:  0  Restlessness:  0  Agitation:  0 due to Acuity of condition         Pain Assessment in Advanced Demential Scale (PAINAD)   Breathing - Independent of vocalization:  0  Negative vocalization:  0  Facial expression:  0  Body language:  0  Consolability:  0  Total:  0    Performance Status:  50    Living Arrangements:  Lives in nursing home    Psychosocial/Cultural:   See Palliative Psychosocial Note: Yes  Lives in [facility] after ICU stay in 2023. Sister is decision maker who lives in [facility]   **Primary  to Follow**  Palliative Care  Consult: Yes        Advance Care Planning  Advance Directives:   Living Will: No    LaPOST: No    Do Not Resuscitate Status: Yes    Medical Power of : Documentation in chart states that pt wishes her sister Zaria to make decisions..      Decision Making:  Family answered questions and Patient unable to communicate due to disease severity/cognitive impairment  Goals of Care: What is most important right now is to focus on improvement in condition but with limits to invasive therapies. Accordingly, we have decided that the best plan to meet the patient's goals includes continuing with treatment.         Significant Labs: CBC:   Recent Labs   Lab 01/08/24 0626 01/09/24  0652   WBC 3.73* 4.53   HGB 7.6* 7.8*   HCT 24.2* 25.2*   PLT 42* 42*       CMP:   Recent Labs   Lab 01/08/24  0627 01/09/24  0652    148*   K 3.9 3.5   * 122*   CO2 20* 20*    134*   BUN 8 9   CREATININE 0.4* 0.4*   CALCIUM 8.7 8.8   PROT 4.5* 4.5*   ALBUMIN 1.9* 1.9*   BILITOT 17.3* 19.3*   ALKPHOS 155* 135   AST 56* 52*   ALT 27 26   ANIONGAP 4* 6*       CBC:   Recent Labs   Lab 01/09/24  0652   WBC 4.53   HGB 7.8*   HCT 25.2*   *   PLT 42*       BMP:  Recent Labs   Lab 01/09/24 0652   *   *   K 3.5   *   CO2 20*   BUN 9   CREATININE  0.4*   CALCIUM 8.8   MG 1.8       LFT:  Lab Results   Component Value Date    AST 52 (H) 01/09/2024    GGT 33 06/17/2023    ALKPHOS 135 01/09/2024    BILITOT 19.3 (H) 01/09/2024     Albumin:   Albumin   Date Value Ref Range Status   01/09/2024 1.9 (L) 3.5 - 5.2 g/dL Final     Protein:   Total Protein   Date Value Ref Range Status   01/09/2024 4.5 (L) 6.0 - 8.4 g/dL Final     Lactic acid:   Lab Results   Component Value Date    LACTATE 1.0 01/05/2024    LACTATE 2.3 (H) 11/29/2023       Significant Imaging: I have reviewed all pertinent imaging results/findings within the past 24 hours.    Catalina Brunner, CNS  Palliative Medicine  [facility] - Transplant Stepdown

## 2024-01-09 NOTE — HPI
This is a 58 year-old female with a complex past medical history including alcoholic cirrhosis (complicated by anemia, thrombocytopenia, hepatic encephalopathy, medication non-compliance), DVT, retroperitoneal hemorrhage, bleeding duodenal ulcer, ESBL UTI, seizure disorder, bipolar disorder. She has been hospitalized multiple times in 2023: 8/31-9/7--hepatic encephalopathy; 8/14-8/18--UTI with sepsis and encephalopathy; 7/15-8/3--bleeding duodenal ulcer requiring IR/GI intervention, hospital course complicated by treatment resistant UTI; 5/28-6/30--extended hospital stay, presented for hepatic encephalopathy vs non-convulsive status epilepticus, transferred from OSH, was intubated and put on propofol however EEGs generally demonstrated triphasic waves, she was taken off propofol and her AED regimen was simplified from Keppra, zonisamide, and Vimpat to just Keppra, discharged only on Keppra 1000mg BID. Current hospitalization is again for decompensated cirrhosis and hepatic encephalopathy in setting of presumed medication non-compliance. Baseline is unknown per primary team, however she was alert and oriented to person and sometimes to place. Per primary team, mentation waxes and wanes. However, for the last three days, they note that she has more frequently been oriented only to person. Additionally has been exhibiting a generalized tremor, which per primary is new. Had an aspiration event on 1/8/24, and antibiotics were escalated from Rocephin (SBP ppx) to Zosyn. Ammonia level has been high this admission (114, 115). She is currently on 1g Keppra BID, Lithium 300mg QHS, Zyprexa 2.5mg QHS, lactulose and rifaximin, thiamine supplementation, and Zosyn as stated above. Primary team has involved palliative this admission. Neurology consulted due to mental status consistently being worse than at time of admission, along with generalized tremor, questioning if EEG and/or adjustments in AED regimen are needed.

## 2024-01-09 NOTE — NURSING
Pt now requiring 4L via NC to maintain saturation > 90% versus previous 1.5L. Continuous pulse ox applied. Dr. Anderson made aware. STAT ABG and STAT chest xray ordered. Respiratory and xray tech made aware of new orders.

## 2024-01-09 NOTE — PROGRESS NOTES
[facility] - Transplant [facility] [place] Medicine  Progress Note    Patient Name: Marely Hamilton  MRN: [id number]  Patient Class: IP- Inpatient   Admission Date: 1/4/2024  Length of Stay: 4 days  Attending Physician: Kaylee Chavez MD  Primary Care Provider: Viktor Ross MD        Subjective:     Principal Problem:Decompensated hepatic cirrhosis        HPI:  Patient is a 58-year-old woman with past medical history significant for alcoholic cirrhosis, bipolar disorder, chronic anemia, thrombocytopenia, cholelithiasis, DVT with IVC filter in place, prior GI bleeds, ESBL UTI history, seizure disorder presenting from nursing [facility] for evaluation of abdominal distension, peripheral edema, and jaundice.     Patient denies fever, chills, or abdominal pain. Patient was recently admitted in early December for hepatic encephalopathy.   Patient denies any pain at this time. She reports she has been urinating frequently though difficult to obtain history.   She denies recent illness/fever/chills/nausea/vomiting/diarrhea/constipation.  Patient has not been taking lactulose. Reports compliance with psychiatric medications and A&Ox3 but reports she can't take the lactulose due to not tolerating the sun.    Hospital Course:   In the ED, vitals stable but hypoxic on room air to the 80s, which improved with supplemental oxygen.  Presentation consistent with decompensated liver failure, meld score 22.    She was sent in by her nursing home facility for mild abdominal distention as well as significant jaundice.    Chest x-ray revealed a moderate-to-large pleural effusion with compressive atelectasis. Likely from her ascites. Her abdomen is soft, nontender.      Intake labs remarkable for Na 142, BUN 6, Cr 0.4, Calcium 7.8, , Tbili 12.1, , ALT 41, Ammonia 114, lactic 1.0.     Will admit for decompensated cirrhosis. Continue on lactulose for treatment of HE, rocephin for SBP ppx. Hepatology and IR consulted for  thoracentesis and further recommendations.   Patient is oriented to person, place, time, and situation but with some delusions.     Overview/Hospital Course:  Palliative care has been helping coordinate with sister. She is now DNR/DNI.     Follow nutrition goals and recommendations however follow aspiration precautions.     Appreciate psychiatry recommendations. Continue lithium and and decreased zyprexa.     Will discuss with neurology for her Keppra.     Escalated antibiotic coverage from rocephin to zosyn due to aspiration overnight on 1/9/24.       Interval History:   Alert however more disoriented today only oriented to self.  She thought I was her sister suzy and she was at [employer].     She had aspiration even overnight and briefly required increased oxygent. Given IV lasix and breathing treatments. Abx excalated to Zosyn.     Discussed with sister again today and she is rather very optimistic that she may turn around. She is coming on Saturday.     Review of Systems   Unable to perform ROS: Mental status change   Constitutional:  Positive for appetite change. Negative for activity change and chills.   HENT:          Desquamation of lips   Respiratory:          Nasal canula in place   Gastrointestinal:         Incontinence of stools   Genitourinary:         Urinary incontinence   Skin:  Positive for color change.   Neurological:  Positive for tremors. Negative for speech difficulty.   Psychiatric/Behavioral:  Positive for confusion.      Objective:     Vital Signs (Most Recent):  Temp: 99.7 °F (37.6 °C) (01/09/24 1220)  Pulse: 90 (01/09/24 1220)  Resp: 16 (01/09/24 1220)  BP: (!) 144/66 (01/09/24 1220)  SpO2: (!) 94 % (01/09/24 1220) Vital Signs (24h Range):  Temp:  [97.5 °F (36.4 °C)-100.3 °F (37.9 °C)] 99.7 °F (37.6 °C)  Pulse:  [] 90  Resp:  [14-18] 16  SpO2:  [88 %-99 %] 94 %  BP: (116-176)/(57-80) 144/66     Body mass index is 21.95 kg/m².    Physical Exam  Vitals reviewed.   Constitutional:        General: She is not in acute distress.     Appearance: She is cachectic. She is ill-appearing.   HENT:      Head: Normocephalic and atraumatic.      Mouth/Throat:      Mouth: Mucous membranes are dry.      Pharynx: No oropharyngeal exudate.      Comments: Desquamation of lips  Eyes:      General: Scleral icterus present.      Extraocular Movements: Extraocular movements intact.   Cardiovascular:      Rate and Rhythm: Normal rate and regular rhythm.      Heart sounds: Normal heart sounds. No murmur heard.  Pulmonary:      Effort: Pulmonary effort is normal. No respiratory distress.      Breath sounds: No wheezing.   Abdominal:      General: Bowel sounds are normal. There is no distension.      Palpations: Abdomen is soft.      Tenderness: There is no abdominal tenderness. There is no guarding.   Musculoskeletal:         General: No tenderness. Normal range of motion.      Cervical back: Normal range of motion and neck supple.   Lymphadenopathy:      Cervical: No cervical adenopathy.   Skin:     General: Skin is warm and dry.      Capillary Refill: Capillary refill takes less than 2 seconds.      Coloration: Skin is jaundiced.      Findings: No rash.   Neurological:      Mental Status: She is alert. She is disoriented.      Motor: Weakness: bilateral lower extremities.      Comments: Poor participation   Psychiatric:         Attention and Perception: She perceives visual hallucinations.         Behavior: Behavior is cooperative.         Thought Content: Thought content is delusional.        Significant Labs: All pertinent labs within the past 24 hours have been reviewed.  Significant Imaging: I have reviewed all pertinent imaging results/findings within the past 24 hours.    Assessment/Plan:      * Decompensated hepatic cirrhosis  Patient with known Cirrhosis with Child's class C. Co-morbidities are present and inclusive of ascites, malnutrition, anemia/pancytopenia, and Pleural effusion .  MELD-Na score calculated;  MELD 3.0: 30 at 1/9/2024  6:52 AM  MELD-Na: 25 at 1/9/2024  6:52 AM  Calculated from:  Serum Creatinine: 0.4 mg/dL (Using min of 1 mg/dL) at 1/9/2024  6:52 AM  Serum Sodium: 148 mmol/L (Using max of 137 mmol/L) at 1/9/2024  6:52 AM  Total Bilirubin: 19.3 mg/dL at 1/9/2024  6:52 AM  Serum Albumin: 1.9 g/dL at 1/9/2024  6:52 AM  INR(ratio): 1.9 at 1/9/2024  6:52 AM  Age at listing (hypothetical): 58 years  Sex: Female at 1/9/2024  6:52 AM      Continue chronic meds. Etiology likely ETOH. Will avoid any hepatotoxic meds, and monitor CBC/CMP/INR for synthetic function.     Presenting with decompensation (increased ascites, Bili 12, ammonia 114).  Status post thoracentesis of 650 mL.  Awaiting cultures.  Could not find a pocket for paracentesis    -restart lactulose as she has been noncompliant  Appreciate hepatology recommendations.      Recommendations to include physical therapy occupational therapy, palliative care discussions for goals of care.  She has not a candidate for transplantation given frailty, poor social support and noncompliance with medication    Acute hypoxic respiratory failure  Patient with Hypoxic Respiratory failure which is Acute on chronic.  she is not on home oxygen. Supplemental oxygen was provided and noted improvement in her O2 sats.  -chest x-ray with right-sided pleural effusion.  Status post thoracentesis and 650 mL of fluid drained.  Awaiting cultures.  Oxygen requirements decreased to 2 L at this time.  Continue to monitor    Now with aspiration PNA. Conitnue zosyn   SLP eval and aspiration precautions.     Presence of IVC filter  Chronic IVC filter as she is at risk for bleeding and had history of DVTs      Goals of care, counseling/discussion  Appreciate palliative care facilitation with goals of care conversation.  Patient is now DNR and other family meeting scheduled for tomorrow.    I also called and spoke to sister Zaria and she is on board and wants us to continue treating her  for encephalopathy and infection and continue psychiatric medications.    Palliative care encounter  Appreciate palliative care recommendations and meeting with family that is her sister.  Family in agreement to make her DNR.  Documentation in place.  Code status updated    Another meeting with sister by palliative. More meetings needed, sister to come her pablo Saturday from [place]      Aspiration pneumonia of right lung  As above  Now on zosyn         Acute metabolic encephalopathy  2/2 cirrhosis - non compliant with Lactulose, continue lactulose.   Polypharmacy - adjusted medications. Decreased Zyprexa.   SBP could not be ruled out as no pocket for ascites, now has aspiration and R sided on CXR, escalate Abx to zosyn   Poor nutrition. Added MVI. Ensure adequate nutrition   Severe debility   Seizure history, on keppra BID, asked neurology to weigh in and if EEG needed or if adjustment to keppra dose.     Palliative care facilitating family meetings. Sister to arrive from [place] on saturday          Bipolar I disorder, most recent episode depressed  Continue home regimen: lithium, olanzapine  -lithium level pending  -alert however some delusions when asked why she's not on her lactulose.  -nurses also confirmed that she is paranoid about her medications and diet    Appreciate psychiatry recommendations we will decrease Zyprexa.      Seizure  History of documented seizures since 2015.  Most recent encounter in June 2023 where she was on 1 g b.i.d. of Keppra.  Continued on Keppra 1 g b.i.d..  Can consider checking an EEG ensure no seizures if she stays confused and verify with Neurology.      Severe malnutrition  Nutrition consulted. Most recent weight and BMI monitored-     Measurements:  Wt Readings from Last 1 Encounters:   01/06/24 58.1 kg (128 lb 1.4 oz)   Body mass index is 22.69 kg/m².    Patient has been screened and assessed by RD.    Malnutrition Type:  Context: chronic illness  Level: severe    Malnutrition  Characteristic Summary:  Subcutaneous Fat (Malnutrition):  (moderate to severe)  Muscle Mass (Malnutrition):  (moderate to severe)  Hand  Strength, Left (Malnutrition): reduced  Hand  Strength, Right (Malnutrition): reduced    Interventions/Recommendations (treatment strategy):  1.) Recommend continuing Low Na diet, fluid per MD. 2.) Recommend Boost Plus (or Boost equivalent) BID to help optimize PRO/Kcal intake (32% of the FR). 3.) Continue to provide folic acid and thiamine- consider adding MVI. RD to monitor PO intake, skin, labs, wt.    Oral intake remains reduced and mouth is dry.  We will give gentle hydration.    Macrocytic anemia  Patient's anemia is currently controlled. Has not received any PRBCs to date. Etiology likely d/t chronic disease due to Chronic liver disease  Current CBC reviewed-   Lab Results   Component Value Date    HGB 7.8 (L) 01/09/2024    HCT 25.2 (L) 01/09/2024     Monitor serial CBC and transfuse if patient becomes hemodynamically unstable, symptomatic or H/H drops below 7.    She is on chronic thiamine and folate.  We will add multivitamin    Ascites  Mild ascites seen on imaging however no pocket seen on attempt for paracentesis on 1/8/24        VTE Risk Mitigation (From admission, onward)           Ordered     Reason for No Pharmacological VTE Prophylaxis  Once        Question:  Reasons:  Answer:  Risk of Bleeding    01/05/24 0317     IP VTE HIGH RISK PATIENT  Once         01/05/24 0317     Place sequential compression device  Until discontinued         01/05/24 0317                    Discharge Planning   BRAYAN: 1/11/2024     Code Status: DNR   Is the patient medically ready for discharge?: No    Reason for patient still in hospital (select all that apply): Patient new problem, Laboratory test, Treatment, and Consult recommendations  Discharge Plan A: Return to nursing home                  Kaylee Chavez MD  Department of Hospital Medicine   [facility] - Transplant Stepdown

## 2024-01-09 NOTE — ASSESSMENT & PLAN NOTE
Patient's anemia is currently controlled. Has not received any PRBCs to date. Etiology likely d/t chronic disease due to Chronic liver disease  Current CBC reviewed-   Lab Results   Component Value Date    HGB 7.8 (L) 01/09/2024    HCT 25.2 (L) 01/09/2024     Monitor serial CBC and transfuse if patient becomes hemodynamically unstable, symptomatic or H/H drops below 7.    She is on chronic thiamine and folate.  We will add multivitamin

## 2024-01-09 NOTE — ASSESSMENT & PLAN NOTE
Patient with Hypoxic Respiratory failure which is Acute on chronic.  she is not on home oxygen. Supplemental oxygen was provided and noted improvement in her O2 sats.  -chest x-ray with right-sided pleural effusion.  Status post thoracentesis and 650 mL of fluid drained.  Awaiting cultures.  Oxygen requirements decreased to 2 L at this time.  Continue to monitor    Now with aspiration PNA. Conitnue zosyn   SLP eval and aspiration precautions.

## 2024-01-09 NOTE — ASSESSMENT & PLAN NOTE
History of documented seizures since 2015.  Most recent encounter in June 2023 where she was on 1 g b.i.d. of Keppra.  Continued on Keppra 1 g b.i.d..  Can consider checking an EEG ensure no seizures if she stays confused and verify with Neurology.

## 2024-01-09 NOTE — PT/OT/SLP PROGRESS
Occupational Therapy   Treatment    Name: Marely Hamilton  MRN: [id number]  Admitting Diagnosis:  Decompensated hepatic cirrhosis       Recommendations:     Discharge Recommendations: Low Intensity Therapy  Discharge Equipment Recommendations:  none  Barriers to discharge:       Assessment:     Marely Hamilton is a 58 y.o. female with a medical diagnosis of Decompensated hepatic cirrhosis.  She presents with continued AMS but was pleasant and followed commands appropriately. Pt was oriented to place and time. Performance deficits affecting function are weakness, impaired endurance, impaired cognition, decreased coordination, impaired self care skills, impaired functional mobility, gait instability, decreased safety awareness, impaired balance, decreased lower extremity function, decreased upper extremity function.     Rehab Prognosis:  Fair; patient would benefit from acute skilled OT services to address these deficits and reach maximum level of function.       Plan:     Patient to be seen 3 x/week to address the above listed problems via self-care/home management, therapeutic activities, therapeutic exercises, neuromuscular re-education  Plan of Care Expires: 02/07/24  Plan of Care Reviewed with: patient    Subjective     Pain/Comfort:  Pain Rating 1: 0/10    Objective:     Communicated with: rn prior to session.  Patient found supine with peripheral IV, telemetry upon OT entry to room.    General Precautions: Standard, fall    Orthopedic Precautions:N/A  Braces: N/A  Respiratory Status: Room air     Occupational Performance:     Bed Mobility:    Patient completed Scooting/Bridging with maximal assistance  Patient completed Supine to Sit with stand by assistance  Patient completed Sit to Supine with minimum assistance     Functional Mobility/Transfers:  Patient completed Sit <> Stand Transfer with total assistance  with  no assistive device   Functional Mobility: Unable to initiate steps due to  debility.    Activities of Daily Living:  Grooming: contact guard assistance washed face sitting EOB.  Upper Body Dressing: maximal assistance  Lower Body Dressing: total assistance    AMPAC 6 Click ADL: 14    Treatment & Education:  Pt sat EOB 12 minutes with SBA.  Completed elbow, shoulder, knee and hip AROM, x 10 reps.  Discussed OT POC.    Patient left supine with all lines intact, call button in reach, and bed alarm on    GOALS:   Multidisciplinary Problems       Occupational Therapy Goals          Problem: Occupational Therapy    Goal Priority Disciplines Outcome Interventions   Occupational Therapy Goal     OT, PT/OT Ongoing, Progressing    Description: Goals to be met by: 2/7/24     Patient will increase functional independence with ADLs by performing:    UE Dressing with Moderate Assistance.  LE Dressing with Maximum Assistance.   Grooming while EOB with Moderate Assistance. Met 1/9  Revised to Min A in standing.  Toileting from bedside commode with Maximum Assistance for hygiene and clothing management.   Rolling to Bilateral with Minimal Assistance.   Supine to sit with Moderate Assistance. Met 1/9  Revised to (S).  Step transfer with Maximum Assistance  Toilet transfer to bedside commode with Maximum Assistance.                         Time Tracking:     OT Date of Treatment: 01/09/24  OT Start Time: 1038  OT Stop Time: 1101  OT Total Time (min): 23 min    Billable Minutes:Self Care/Home Management 11  Therapeutic Activity 12    OT/DC: OT          1/9/2024

## 2024-01-09 NOTE — ASSESSMENT & PLAN NOTE
58 year-old female with a complex past medical history including alcoholic cirrhosis complicated by acute decompensated liver failure, hepatic cirrhosis, anemia and thrombocytopenia further complicated by GI bleed, retroperitoneal bleed. Additional history includes seizure disorder and bipolar disorder. Multiple prior hospitalizations within the last year for encephalopathy (either hepatic and/or in setting of sepsis or other major medical events). Notably was hospitalized from 5/28/23 to 6/30/23 for hepatic encephalopathy vs non-convulsive status epilepticus: EEGs demonstrated predominantly triphasics/encephalopathy--AED regimen was de-escalated from Zonisamide, Vimpat and Keppra to Keppra 1000mg BID (was discharged on this). Currently hospitalized for acute decompensated cirrhosis and hepatic encephalopathy (also with pleural effusion: likely hepatic hydrothorax) in setting of presumed non-compliance with home lactulose. Baseline unknown per primary, however was reportedly alert and oriented to person and place on admission. Mentation has waxed/waned but primary says that she has more consistently been oriented only to person for the last three days. Has also been exhibiting generalized tremor, which per primary team is new. Hospital stay has been further complicated by aspiration event on 1/8/23, requiring escalation of antibiotics: Rocephin (SBP ppx) to Zosyn. Neurology consulted due to tremor, encephalopathy, questioning whether EEG or AED adjustment is necessary. On labs, ammonia elevated, Lithium level borderline low, Keppra level in process (ordered after admission). Physical exam is notable for profound jaundice, asterixis (hands/feet). She is oriented to person/place at time of evaluation. Attention is poor, but does follow commands: has more difficulty with multi-step commands. No clonus, Babinski downgoing. No gaze deviation, automatisms, convulsions observed at bedside. With this in mind, doubt that  encephalopathy is due to seizure: more likely due to toxic-metabolic +/- infection (recent aspiration event). Further agree that malnutrition/debility also likely contributes (ie has poor physiologic reserve). Likely also has poor neurologic reserve in setting of recurrent episodes of hepatic encephalopathy requiring multiple hospitalizations, making it more difficult for her to tolerate toxic-metabolic, etc. abnormalities. Regardless, in setting of history of seizure disorder, will still need to rule out with EEG.    Recommendations  -continuous EEG (ordered)  -keep Keppra at 1000mg BID  ->follow up Keppra level  -please continue to treat toxic/metabolic and infectious abnormalities  -will attempt to contact family regarding baseline mental status  -neurology will continue to follow  -contact us with questions/concerns

## 2024-01-09 NOTE — PLAN OF CARE
Problem: Occupational Therapy  Goal: Occupational Therapy Goal  Description: Goals to be met by: 2/7/24     Patient will increase functional independence with ADLs by performing:    UE Dressing with Moderate Assistance.  LE Dressing with Maximum Assistance.   Grooming while EOB with Moderate Assistance. Met 1/9  Revised to Min A in standing.  Toileting from bedside commode with Maximum Assistance for hygiene and clothing management.   Rolling to Bilateral with Minimal Assistance.   Supine to sit with Moderate Assistance. Met 1/9  Revised to (S).  Step transfer with Maximum Assistance  Toilet transfer to bedside commode with Maximum Assistance.    1/9/2024 1408 by Erwin Wilkins LOTR  Outcome: Ongoing, Progressing  1/9/2024 1407 by Erwin Wilkins LOTR  Outcome: Ongoing, Progressing

## 2024-01-09 NOTE — TREATMENT PLAN
Hepatology Treatment Plan    Marely Hamilton is a 58 y.o. female admitted to hospital 1/4/2024 (Hospital Day: 6) due to Decompensated hepatic cirrhosis.     Interval History    Concern for aspiration event last night  Required 5L O2 overnight, down to 3L this morning  Waxing/waning mentation overnight      Objective  Temp:  [97.5 °F (36.4 °C)-100.3 °F (37.9 °C)] 99.1 °F (37.3 °C) (01/09 0738)  Pulse:  [] 97 (01/09 0738)  BP: (116-176)/(57-80) 120/58 (01/09 0738)  Resp:  [14-18] 14 (01/09 0738)  SpO2:  [88 %-99 %] 95 % (01/09 0738)      Laboratory    Lab Results   Component Value Date    WBC 4.53 01/09/2024    HGB 7.8 (L) 01/09/2024    HCT 25.2 (L) 01/09/2024     (H) 01/09/2024    PLT 42 (L) 01/09/2024       Lab Results   Component Value Date     (H) 01/09/2024    K 3.5 01/09/2024     (H) 01/09/2024    CO2 20 (L) 01/09/2024    BUN 9 01/09/2024    CREATININE 0.4 (L) 01/09/2024    CALCIUM 8.8 01/09/2024       Lab Results   Component Value Date    ALBUMIN 1.9 (L) 01/09/2024    ALT 26 01/09/2024    AST 52 (H) 01/09/2024    GGT 33 06/17/2023    ALKPHOS 135 01/09/2024    BILITOT 19.3 (H) 01/09/2024       Lab Results   Component Value Date    INR 1.9 (H) 01/09/2024    INR 2.0 (H) 01/08/2024    INR 1.8 (H) 01/07/2024       MELD 3.0: 30 at 1/9/2024  6:52 AM  MELD-Na: 25 at 1/9/2024  6:52 AM  Calculated from:  Serum Creatinine: 0.4 mg/dL (Using min of 1 mg/dL) at 1/9/2024  6:52 AM  Serum Sodium: 148 mmol/L (Using max of 137 mmol/L) at 1/9/2024  6:52 AM  Total Bilirubin: 19.3 mg/dL at 1/9/2024  6:52 AM  Serum Albumin: 1.9 g/dL at 1/9/2024  6:52 AM  INR(ratio): 1.9 at 1/9/2024  6:52 AM  Age at listing (hypothetical): 58 years  Sex: Female at 1/9/2024  6:52 AM      Assessment  Marely Hamilton is a 58 y.o. female with history of EtOH cirrhosis, bipolar disorder, DVT with IVC filter in place, prior GI bleeds, ESBL UTI history, and seizure disorder. She presented to the ER from her nursing home for  abdominal distension, peripheral edema, and jaundice. Found to have pleural effusion; thora performed. Has had multiple admissions in the past year for HE, UTI, falls, and GI bleeding (EGD on 7/6/23 showed esophageal ulcer, no varices, non-bleeding gastric ulcer and duodenal ulcer with oozing). Extremely frail and ill appearing on exam. Has been declined for a liver transplant twice - for frailty, non-compliance and comorbidities. S/P thoracentesis on 1/5/23 - 650cc fluid removed. No plans for inpatient liver tx eval given frailty, social situation, and medical/psychiatric comorbidities.         Problem List:  Decompensated cirrhosis 2/2 EtOH use (c/b HE and ascites)  Hx of bleeding gastric and duodenal ulcers  EtOH use disorder  Bipolar disorder  Medication non-compliance    Plan  - Appreciate palliative care involvement  - Pleural effusion transudative based on fluid studies. Consistent with hepatic hydrothorax.  - Agree with continuing diuresis, IV lasix on 1/9  - Continue lactulose 15 gm TID & titrate till 3-4 soft BM achieved daily. Continue Xifaxin 550 mg BID  - Continue home psychiatric medications per psych recs  - Avoid sedating drugs like opiates and BZDs, if possible.   - Low Na, high protein diet.   - For frailty: Continue PT, OT, and Nutrition.    - please obtain daily CBC, BMP, LFT, INR  - Plan of care was discussed with primary team    Thank you for involving us in the care of Marely Hamilton. Please call with any additional concerns or questions.    Roberto Faith  Gastroenterology and Hepatology Fellow, PGY-V

## 2024-01-09 NOTE — CONSULTS
[facility]  Neurology  Consult Note    Patient Name: Marely Hamilton  MRN: [id number]  Admission Date: 1/4/2024  Hospital Length of Stay: 4 days  Code Status: DNR   Attending Provider: Kaylee Chavez MD   Consulting Provider: Sarmad Burnette DO  Primary Care Physician: Viktor Ross MD  Principal Problem:Decompensated hepatic cirrhosis    Inpatient consult to Neurology  Consult performed by: Sarmad Burnette DO  Consult ordered by: Kaylee Chavez MD         Subjective:     Chief Complaint:  encephalopathy--seizure work-up/treatment     HPI:   This is a 58 year-old female with a complex past medical history including alcoholic cirrhosis (complicated by anemia, thrombocytopenia, hepatic encephalopathy, medication non-compliance), DVT, retroperitoneal hemorrhage, bleeding duodenal ulcer, ESBL UTI, seizure disorder, bipolar disorder. She has been hospitalized multiple times in 2023: 8/31-9/7--hepatic encephalopathy; 8/14-8/18--UTI with sepsis and encephalopathy; 7/15-8/3--bleeding duodenal ulcer requiring IR/GI intervention, hospital course complicated by treatment resistant UTI; 5/28-6/30--extended hospital stay, presented for hepatic encephalopathy vs non-convulsive status epilepticus, transferred from OSH, was intubated and put on propofol however EEGs generally demonstrated triphasic waves, she was taken off propofol and her AED regimen was simplified from Keppra, zonisamide, and Vimpat to just Keppra, discharged only on Keppra 1000mg BID. Current hospitalization is again for decompensated cirrhosis and hepatic encephalopathy in setting of presumed medication non-compliance. Baseline is unknown per primary team, however she was alert and oriented to person and sometimes to place on admission. Per primary team, mentation waxes and wanes. However, for the last three days, they note that she has more frequently been oriented only to person. Additionally has been exhibiting a generalized tremor, which per  primary is new. Had an aspiration event on 1/8/24, and antibiotics were escalated from Rocephin (SBP ppx) to Zosyn. Ammonia level has been high this admission (114, 115). She is currently on 1g Keppra BID, Lithium 300mg QHS, Zyprexa 2.5mg QHS, lactulose and rifaximin, thiamine supplementation, and Zosyn as stated above. Primary team has involved palliative this admission. Neurology consulted due to mental status consistently being worse than at time of admission, along with generalized tremor, questioning if EEG and/or adjustments in AED regimen are needed.     Past Medical History:   Diagnosis Date    Addiction to drug     Alcohol abuse     Alcohol abuse, in remission 6/15/2015    14.5 weeks ago; AA weekly    Anemia     Anxiety 6/15/2015    Behavioral problem     Bipolar disorder     Bipolar disorder in remission 6/15/2015    Cirrhosis, Laennec's 6/15/2015    Depression     Encounter for blood transfusion     Epistaxis 6/15/2015    Fatigue     Glaucoma     Hematuria     Hepatic encephalopathy 6/15/2015    Hepatic enlargement     History of psychiatric care     History of psychiatric hospitalization     History of seizure 6/15/2015    1    hx of intentional Tylenol overdose 6/15/2015    2005- situational and hx of bipolar    Hx of psychiatric care     Macrocytic anemia 9/18/2015    6 units PRBC since June 2015    [facility]     Osteoarthritis     Other ascites 6/15/2015    Psychiatric exam requested by authority     Psychiatric problem     Psychosis 9/26/2019    Renal disorder     Seizures     Self-harming behavior     Suicide attempt     Therapy     Thrombocytopenia 6/15/2015       Past Surgical History:   Procedure Laterality Date    COSMETIC SURGERY      EMBOLIZATION N/A 6/11/2023    Procedure: EMBOLIZATION, BLOOD VESSEL;  Surgeon: Debbie Surgeon;  Location: [facility];  Service: Anesthesiology;  Laterality: N/A;    ESOPHAGOGASTRODUODENOSCOPY      ESOPHAGOGASTRODUODENOSCOPY N/A 7/6/2023    Procedure: EGD  (ESOPHAGOGASTRODUODENOSCOPY);  Surgeon: Bernice Guillen MD;  Location: [facility] ([address] [place] [address]);  Service: Endoscopy;  Laterality: N/A;    ESOPHAGOGASTRODUODENOSCOPY N/A 7/11/2023    Procedure: EGD (ESOPHAGOGASTRODUODENOSCOPY);  Surgeon: Rangel Broussard MD;  Location: [address]);  Service: Endoscopy;  Laterality: N/A;       Review of patient's allergies indicates:   Allergen Reactions    Sulfa (sulfonamide antibiotics) Rash    Codeine Nausea And Vomiting       Current Neurological Medications: see below.    No current facility-administered medications on file prior to encounter.     Current Outpatient Medications on File Prior to Encounter   Medication Sig    ALPRAZolam (XANAX) 0.25 MG tablet Take 1 tablet (0.25 mg total) by mouth 2 (two) times daily as needed for Anxiety.    brimonidine-timoloL (COMBIGAN) 0.2-0.5 % Drop Place 1 drop into both eyes 2 (two) times a day.    ferrous sulfate (FEOSOL) 325 mg (65 mg iron) Tab tablet Take 325 mg by mouth once daily.    furosemide (LASIX) 20 MG tablet Take 20 mg by mouth once daily.    levetiracetam 500 mg/5 mL (5 mL) Soln Take 10 mLs (1,000 mg total) by mouth 2 (two) times daily.    lithium (LITHOBID) 300 MG CR tablet Take 1 tablet (300 mg total) by mouth every evening. (Patient taking differently: Take 600 mg by mouth every evening.)    miconazole nitrate 2% (MICOTIN) 2 % Oint Apply topically 2 (two) times daily.    OLANZapine (ZYPREXA) 5 MG tablet Take 1 tablet (5 mg total) by mouth every evening.    pantoprazole (PROTONIX) 40 mg suspension Take 1 packet (40 mg total) by mouth 2 (two) times daily.    rifAXIMin (XIFAXAN) 550 mg Tab Take 1 tablet (550 mg total) by mouth 2 (two) times daily.    spironolactone (ALDACTONE) 100 MG tablet Take 100 mg by mouth once daily.     Family History       Problem Relation (Age of Onset)    Alcohol abuse Father, Sister, Paternal Grandfather    Bipolar disorder Father    Hypertension Mother    Suicide Father          Tobacco Use     "Smoking status: Never    Smokeless tobacco: Never   Substance and Sexual Activity    Alcohol use: Not Currently     Comment: hx of ETOH abuse with cirrhosis of liver    Drug use: No    Sexual activity: Not Currently     Review of Systems   Unable to perform ROS: Mental status change     Objective:     Vital Signs (Most Recent):  Temp: 99.7 °F (37.6 °C) (01/09/24 1220)  Pulse: 90 (01/09/24 1220)  Resp: 16 (01/09/24 1220)  BP: (!) 144/66 (01/09/24 1220)  SpO2: (!) 94 % (01/09/24 1220) Vital Signs (24h Range):  Temp:  [97.5 °F (36.4 °C)-100.3 °F (37.9 °C)] 99.7 °F (37.6 °C)  Pulse:  [] 90  Resp:  [14-18] 16  SpO2:  [88 %-99 %] 94 %  BP: (116-176)/(57-80) 144/66     Weight: 56.2 kg (123 lb 14.4 oz)  Body mass index is 21.95 kg/m².     Physical Exam  Vitals and nursing note reviewed.   Constitutional:       Appearance: She is ill-appearing.   HENT:      Head: Normocephalic and atraumatic.   Eyes:      General: Scleral icterus present.   Cardiovascular:      Pulses: Normal pulses.      Heart sounds: Normal heart sounds.   Pulmonary:      Effort: Pulmonary effort is normal.   Skin:     Coloration: Skin is jaundiced.     Neurological Exam:  MENTAL STATUS  Level of consciousness: alert  Orientation: oriented to person and place ("I am in the hospital") only  Attention: poor  Concentration: poor  Speech: normal    No forced gaze deviation. Follows commands despite poor concentration/attention. No convulsions. No automatisms observed.    CRANIAL NERVES  CN II: visual fields full to confrontation  CN III, IV, VI: PERRL, EOMI  CN VII: facial expression symmetric and full  CN IX, X: symmetric palate elevation; phonation normal  CN XI: shoulder shrug and head turn intact bilaterally  CN XII: tongue midline, no deviation upon protrusion    MOTOR EXAM  Muscle bulk: reduced  Pronator drift: absent    Strength - Upper Extremities   Arm abduction Elbow flexion Elbow extension   Right 4/5 4/5 4/5   Left 4/5 4/5 4/5     Strength - " "Lower Extremities   Hip flexion Knee flexion Knee extension   Right 3/5 3/5 3/5   Left 3/5 3/5 3/5     REFLEXES   Biceps Brachioradialis Patellar Achilles   Right +3 +3 +2 +2   Left +3 +3 +2 +2     Planter reflex: down-going bilaterally. No clonus.    SENSORY EXAM  Light touch: patient reports "I cannot feel anywhere." Withdraws to pain throughout all 4 extremities.    COORDINATION  Tremor: asterixis, generalized, can see in hands and feet  Gait: deferred              Significant Labs: All pertinent lab results from the past 24 hours have been reviewed.    Significant Imaging: I have reviewed all pertinent imaging results/findings within the past 24 hours.  Assessment and Plan:     Acute metabolic encephalopathy  58 year-old female with a complex past medical history including alcoholic cirrhosis complicated by acute decompensated liver failure, anemia and thrombocytopenia further complicated by GI bleed, retroperitoneal bleed. Additional history includes seizure disorder and bipolar disorder. Multiple prior hospitalizations within the last year for encephalopathy (either hepatic and/or in setting of sepsis or other major medical events). Notably was hospitalized from 5/28/23 to 6/30/23 for hepatic encephalopathy vs non-convulsive status epilepticus: EEGs demonstrated predominantly triphasics/encephalopathy--AED regimen was de-escalated from Zonisamide, Vimpat and Keppra to Keppra 1000mg BID (was discharged on this). Currently hospitalized for acute decompensated cirrhosis and hepatic encephalopathy (also with pleural effusion: likely hepatic hydrothorax) in setting of presumed non-compliance with home lactulose. Baseline unknown per primary, however was reportedly alert and oriented to person and place on admission. Mentation has waxed/waned but primary says that she has more consistently been oriented only to person for the last three days. Has also been exhibiting generalized tremor, which per primary team is " new. Hospital stay has been further complicated by aspiration event on 1/8/23, requiring escalation of antibiotics: Rocephin (SBP ppx) to Zosyn. Neurology consulted due to tremor, encephalopathy, questioning whether EEG or AED adjustment is necessary. On labs, ammonia elevated, Lithium level borderline low, Keppra level in process (ordered after admission). Physical exam is notable for profound jaundice, asterixis (hands/feet). She is oriented to person/place at time of evaluation. Attention is poor, but does follow commands: has more difficulty with multi-step commands. No clonus, Babinski downgoing. No gaze deviation, automatisms, convulsions observed at bedside. With this in mind, doubt that encephalopathy is due to seizure: more likely due to toxic-metabolic +/- infection (recent aspiration event). Further agree that malnutrition/debility also likely contributes (ie has poor physiologic reserve). Likely also has poor neurologic reserve in setting of recurrent episodes of hepatic encephalopathy requiring multiple hospitalizations, making it more difficult for her to tolerate toxic-metabolic, etc. abnormalities. Regardless, in setting of history of seizure disorder, will still need to rule out seizure with EEG.    Recommendations  -continuous EEG (ordered)  -keep Keppra at 1000mg BID  ->follow up Keppra level  -please continue to treat toxic/metabolic and infectious abnormalities  -please avoid neurotoxic antibiotics such as cefepime or carbapenems  -will attempt to contact family regarding baseline mental status  -neurology will continue to follow  -contact us with questions/concerns        VTE Risk Mitigation (From admission, onward)           Ordered     Reason for No Pharmacological VTE Prophylaxis  Once        Question:  Reasons:  Answer:  Risk of Bleeding    01/05/24 0317     IP VTE HIGH RISK PATIENT  Once         01/05/24 0317     Place sequential compression device  Until discontinued         01/05/24 0317                     Thank you for your consult. I will follow-up with patient. Please contact us if you have any additional questions.    Sarmad Burnette, DO  Neurology  [facility]

## 2024-01-09 NOTE — ASSESSMENT & PLAN NOTE
Appreciate palliative care recommendations and meeting with family that is her sister.  Family in agreement to make her DNR.  Documentation in place.  We will change code status.  Planned for another meeting tomorrow morning.

## 2024-01-09 NOTE — SIGNIFICANT EVENT
Nursing messaged tonight  Pt taking meds with pudding and had bout of coughing/choking aspiration suspected    She has had increasd O2 requirement over the night from 1L to 5L     CXR obtained now worsening Right mid/lower airspace disase concern aspiration, also ?if effusion returning    Pt sleeping not tachypneic or in respiratory distress    Plan  -chest pT for aspiration  -SLP reconsult  -diet changed to level 5 minced/moist  -Lasix IV for AM  - will need supportive care to optimize pulm function in setting of presumed aspiration    Active Hospital Problems    Diagnosis  POA    *Decompensated hepatic cirrhosis [K72.90, K74.60]  Yes    Aspiration of food [T17.928A, W44.F3XA]  No     Priority: 1 - High    Acute hypoxic respiratory failure [J96.01]  Yes     Priority: 1 - High    Bipolar 1 disorder, depressed, severe [F31.4]  Yes     Priority: 7     ACP (advance care planning) [Z71.89]  Not Applicable    Presence of IVC filter [Z95.828]  Not Applicable    Goals of care, counseling/discussion [Z71.89]  Not Applicable    Palliative care encounter [Z51.5]  Not Applicable    Electrolyte abnormality [E87.8]  Yes    Seizure [R56.9]  Yes     In responsive to benzo withdrawl      Severe malnutrition [E43]  Yes    Macrocytic anemia [D53.9]  Yes     6 units PRBC since June 2015      Ascites [R18.8]  Yes      Resolved Hospital Problems    Diagnosis Date Resolved POA    Acute liver failure without hepatic coma [K72.00] 01/06/2024 Yes    Hypokalemia [E87.6] 01/07/2024 Yes

## 2024-01-09 NOTE — PROGRESS NOTES
[facility] Medicine  Progress Note    Patient Name: Marely Hamilton  MRN: [id number]  Patient Class: IP- Inpatient   Admission Date: 1/4/2024  Length of Stay: 3 days  Attending Physician: Kaylee Chavez MD  Primary Care Provider: Viktor Ross MD        Subjective:     Principal Problem:Decompensated hepatic cirrhosis        HPI:  Patient is a 58-year-old woman with past medical history significant for alcoholic cirrhosis, bipolar disorder, chronic anemia, thrombocytopenia, cholelithiasis, DVT with IVC filter in place, prior GI bleeds, ESBL UTI history, seizure disorder presenting from nursing [facility] for evaluation of abdominal distension, peripheral edema, and jaundice.     Patient denies fever, chills, or abdominal pain. Patient was recently admitted in early December for hepatic encephalopathy.   Patient denies any pain at this time. She reports she has been urinating frequently though difficult to obtain history.   She denies recent illness/fever/chills/nausea/vomiting/diarrhea/constipation.  Patient has not been taking lactulose. Reports compliance with psychiatric medications and A&Ox3 but reports she can't take the lactulose due to not tolerating the sun.    Hospital Course:   In the ED, vitals stable but hypoxic on room air to the 80s, which improved with supplemental oxygen.  Presentation consistent with decompensated liver failure, meld score 22.    She was sent in by her nursing home facility for mild abdominal distention as well as significant jaundice.    Chest x-ray revealed a moderate-to-large pleural effusion with compressive atelectasis. Likely from her ascites. Her abdomen is soft, nontender.      Intake labs remarkable for Na 142, BUN 6, Cr 0.4, Calcium 7.8, , Tbili 12.1, , ALT 41, Ammonia 114, lactic 1.0.     Will admit for decompensated cirrhosis. Continue on lactulose for treatment of HE, rocephin for SBP ppx. Hepatology and IR consulted for  thoracentesis and further recommendations.   Patient is oriented to person, place, time, and situation but with some delusions.     Overview/Hospital Course:  No notes on file    Interval History:   Alert however more disoriented today only oriented to self.  Stable oxygen requirements decreased to 2 L after thoracentesis.  Has been incontinent to stool and urine.      Evaluated by psychiatry and agree with plan and continuation of the same medicines.  Decrease Zyprexa to 2.5 mg nightly.    Palliative Care had a discussion with sister Zaria and she agreed to DNR status however continued to treatment.  Another meeting planned for tomorrow morning.  We will change code status    Paracentesis were attempted however for paracentesis.    Review of Systems   Unable to perform ROS: Mental status change   Constitutional:  Positive for appetite change. Negative for activity change and chills.   HENT:          Desquamation of lips   Respiratory:          Nasal canula in place   Gastrointestinal:         Incontinence of stools   Genitourinary:         Urinary incontinence   Skin:  Positive for color change.   Neurological:  Positive for tremors. Negative for speech difficulty.   Psychiatric/Behavioral:  Positive for confusion.      Objective:     Vital Signs (Most Recent):  Temp: 100.3 °F (37.9 °C) (01/08/24 1548)  Pulse: 109 (01/08/24 1548)  Resp: 18 (01/08/24 1548)  BP: 130/62 (01/08/24 1548)  SpO2: 95 % (01/08/24 1548) Vital Signs (24h Range):  Temp:  [97.5 °F (36.4 °C)-100.3 °F (37.9 °C)] 100.3 °F (37.9 °C)  Pulse:  [] 109  Resp:  [18-20] 18  SpO2:  [93 %-95 %] 95 %  BP: (118-131)/(60-82) 130/62     Body mass index is 22.69 kg/m².    Physical Exam  Vitals reviewed.   Constitutional:       General: She is not in acute distress.     Appearance: She is cachectic. She is ill-appearing.   HENT:      Head: Normocephalic and atraumatic.      Mouth/Throat:      Mouth: Mucous membranes are dry.      Pharynx: No  oropharyngeal exudate.      Comments: Desquamation of lips  Eyes:      General: Scleral icterus present.      Extraocular Movements: Extraocular movements intact.   Cardiovascular:      Rate and Rhythm: Normal rate and regular rhythm.      Heart sounds: Normal heart sounds. No murmur heard.  Pulmonary:      Effort: Pulmonary effort is normal. No respiratory distress.      Breath sounds: No wheezing.   Abdominal:      General: Bowel sounds are normal. There is no distension.      Palpations: Abdomen is soft.      Tenderness: There is no abdominal tenderness. There is no guarding.   Musculoskeletal:         General: No tenderness. Normal range of motion.      Cervical back: Normal range of motion and neck supple.   Lymphadenopathy:      Cervical: No cervical adenopathy.   Skin:     General: Skin is warm and dry.      Capillary Refill: Capillary refill takes less than 2 seconds.      Coloration: Skin is jaundiced.      Findings: No rash.   Neurological:      Mental Status: She is alert. She is disoriented.      Motor: Weakness: bilateral lower extremities.      Comments: Poor participation   Psychiatric:         Attention and Perception: She perceives visual hallucinations.         Behavior: Behavior is cooperative.         Thought Content: Thought content is delusional.        Significant Labs: All pertinent labs within the past 24 hours have been reviewed.  Significant Imaging: I have reviewed all pertinent imaging results/findings within the past 24 hours.    Assessment/Plan:      * Decompensated hepatic cirrhosis  Patient with known Cirrhosis with Child's class C. Co-morbidities are present and inclusive of ascites, malnutrition, anemia/pancytopenia, and Pleural effusion .  MELD-Na score calculated; MELD 3.0: 30 at 1/8/2024  6:27 AM  MELD-Na: 25 at 1/8/2024  6:27 AM  Calculated from:  Serum Creatinine: 0.4 mg/dL (Using min of 1 mg/dL) at 1/8/2024  6:27 AM  Serum Sodium: 144 mmol/L (Using max of 137 mmol/L) at  1/8/2024  6:27 AM  Total Bilirubin: 17.3 mg/dL at 1/8/2024  6:27 AM  Serum Albumin: 1.9 g/dL at 1/8/2024  6:27 AM  INR(ratio): 2.0 at 1/8/2024  6:27 AM  Age at listing (hypothetical): 58 years  Sex: Female at 1/8/2024  6:27 AM      Continue chronic meds. Etiology likely ETOH. Will avoid any hepatotoxic meds, and monitor CBC/CMP/INR for synthetic function.     Presenting with decompensation (increased ascites, Bili 12, ammonia 114).  Status post thoracentesis of 650 mL.  Awaiting cultures.  Could not find a pocket for paracentesis.  Continue Rocephin for now    -restart lactulose as she has been noncompliant  Appreciate hepatology recommendations.      Recommendations to include physical therapy occupational therapy, palliative care discussions for goals of care.  She has not a candidate for transplantation given frailty, poor social support and noncompliance with medication    Acute hypoxic respiratory failure  Patient with Hypoxic Respiratory failure which is Acute on chronic.  she is not on home oxygen. Supplemental oxygen was provided and noted improvement in her O2 sats.  -chest x-ray with right-sided pleural effusion.  Status post thoracentesis and 650 mL of fluid drained.  Awaiting cultures.  Oxygen requirements decreased to 2 L at this time.  Continue to monitor    Presence of IVC filter  Chronic IVC filter as she is at risk for bleeding and had history of DVTs      Goals of care, counseling/discussion  Appreciate palliative care facilitation with goals of care conversation.  Patient is now DNR and other family meeting scheduled for tomorrow.    I also called and spoke to sister Zaria and she is on board and wants us to continue treating her for encephalopathy and infection and continue psychiatric medications.    Palliative care encounter  Appreciate palliative care recommendations and meeting with family that is her sister.  Family in agreement to make her DNR.  Documentation in place.  We will change  code status.  Planned for another meeting tomorrow morning.      Electrolyte abnormality  Patient has Abnormal Phosphorus: hypophosphatemia. Will continue to monitor electrolytes closely. Will replace the affected electrolytes and repeat labs to be done after interventions completed. The patient's phosphorus results have been reviewed and are listed below.  Recent Labs   Lab 01/08/24  0627   PHOS 2.5*            Bipolar 1 disorder, depressed, severe  Continue home regimen: lithium, olanzapine  -lithium level pending  -alert however some delusions when asked why she's not on her lactulose.  -nurses also confirmed that she is paranoid about her medications and diet    Appreciate psychiatry recommendations we will decrease Zyprexa.      Seizure  History of documented seizures since 2015.  Most recent encounter in June 2023 where she was on 1 g b.i.d. of Keppra.  Continued on Keppra 1 g b.i.d..  Can consider checking an EEG ensure no seizures if she stays confused and verify with Neurology.      Severe malnutrition  Nutrition consulted. Most recent weight and BMI monitored-     Measurements:  Wt Readings from Last 1 Encounters:   01/06/24 58.1 kg (128 lb 1.4 oz)   Body mass index is 22.69 kg/m².    Patient has been screened and assessed by RD.    Malnutrition Type:  Context: chronic illness  Level: severe    Malnutrition Characteristic Summary:  Subcutaneous Fat (Malnutrition):  (moderate to severe)  Muscle Mass (Malnutrition):  (moderate to severe)  Hand  Strength, Left (Malnutrition): reduced  Hand  Strength, Right (Malnutrition): reduced    Interventions/Recommendations (treatment strategy):  1.) Recommend continuing Low Na diet, fluid per MD. 2.) Recommend Boost Plus (or Boost equivalent) BID to help optimize PRO/Kcal intake (32% of the FR). 3.) Continue to provide folic acid and thiamine- consider adding MVI. RD to monitor PO intake, skin, labs, wt.    Oral intake remains reduced and mouth is dry.  We will  give gentle hydration.    Macrocytic anemia  Patient's anemia is currently controlled. Has not received any PRBCs to date. Etiology likely d/t chronic disease due to Chronic liver disease  Current CBC reviewed-   Lab Results   Component Value Date    HGB 7.6 (L) 01/08/2024    HCT 24.2 (L) 01/08/2024     Monitor serial CBC and transfuse if patient becomes hemodynamically unstable, symptomatic or H/H drops below 7.    She is on chronic thiamine and folate.  We will add multivitamin    Ascites  Mild ascites seen on imaging however no pocket seen on attempt for paracentesis.        VTE Risk Mitigation (From admission, onward)           Ordered     Reason for No Pharmacological VTE Prophylaxis  Once        Question:  Reasons:  Answer:  Risk of Bleeding    01/05/24 0317     IP VTE HIGH RISK PATIENT  Once         01/05/24 0317     Place sequential compression device  Until discontinued         01/05/24 0317                    Discharge Planning   BRAYAN: 1/11/2024     Code Status: DNR   Is the patient medically ready for discharge?: No    Reason for patient still in hospital (select all that apply): Laboratory test, Treatment, and Consult recommendations  Discharge Plan A: Return to nursing home                  Kaylee Chavez MD  Department of Hospital Medicine   [facility]

## 2024-01-09 NOTE — ASSESSMENT & PLAN NOTE
Appreciate palliative care facilitation with goals of care conversation.  Patient is now DNR and other family meeting scheduled for tomorrow.    I also called and spoke to sister Zaria and she is on board and wants us to continue treating her for encephalopathy and infection and continue psychiatric medications.

## 2024-01-09 NOTE — ASSESSMENT & PLAN NOTE
2/2 cirrhosis - non compliant with Lactulose, continue lactulose.   Polypharmacy - adjusted medications. Decreased Zyprexa.   SBP could not be ruled out as no pocket for ascites, now has aspiration and R sided on CXR, escalate Abx to zosyn   Poor nutrition. Added MVI. Ensure adequate nutrition   Severe debility   Seizure history, on keppra BID, asked neurology to weigh in and if EEG needed or if adjustment to keppra dose.     Palliative care facilitating family meetings. Sister to arrive from [place] on saturday

## 2024-01-09 NOTE — ASSESSMENT & PLAN NOTE
Patient with known Cirrhosis with Child's class C. Co-morbidities are present and inclusive of ascites, malnutrition, anemia/pancytopenia, and Pleural effusion .  MELD-Na score calculated; MELD 3.0: 30 at 1/9/2024  6:52 AM  MELD-Na: 25 at 1/9/2024  6:52 AM  Calculated from:  Serum Creatinine: 0.4 mg/dL (Using min of 1 mg/dL) at 1/9/2024  6:52 AM  Serum Sodium: 148 mmol/L (Using max of 137 mmol/L) at 1/9/2024  6:52 AM  Total Bilirubin: 19.3 mg/dL at 1/9/2024  6:52 AM  Serum Albumin: 1.9 g/dL at 1/9/2024  6:52 AM  INR(ratio): 1.9 at 1/9/2024  6:52 AM  Age at listing (hypothetical): 58 years  Sex: Female at 1/9/2024  6:52 AM      Continue chronic meds. Etiology likely ETOH. Will avoid any hepatotoxic meds, and monitor CBC/CMP/INR for synthetic function.     Presenting with decompensation (increased ascites, Bili 12, ammonia 114).  Status post thoracentesis of 650 mL.  Awaiting cultures.  Could not find a pocket for paracentesis    -restart lactulose as she has been noncompliant  Appreciate hepatology recommendations.      Recommendations to include physical therapy occupational therapy, palliative care discussions for goals of care.  She has not a candidate for transplantation given frailty, poor social support and noncompliance with medication

## 2024-01-09 NOTE — SUBJECTIVE & OBJECTIVE
Past Medical History:   Diagnosis Date    Addiction to drug     Alcohol abuse     Alcohol abuse, in remission 6/15/2015    14.5 weeks ago; AA weekly    Anemia     Anxiety 6/15/2015    Behavioral problem     Bipolar disorder     Bipolar disorder in remission 6/15/2015    Cirrhosis, Laennec's 6/15/2015    Depression     Encounter for blood transfusion     Epistaxis 6/15/2015    Fatigue     Glaucoma     Hematuria     Hepatic encephalopathy 6/15/2015    Hepatic enlargement     History of psychiatric care     History of psychiatric hospitalization     History of seizure 6/15/2015    1    hx of intentional Tylenol overdose 6/15/2015    2005- situational and hx of bipolar    Hx of psychiatric care     Macrocytic anemia 9/18/2015    6 units PRBC since June 2015    Jeana     Osteoarthritis     Other ascites 6/15/2015    Psychiatric exam requested by authority     Psychiatric problem     Psychosis 9/26/2019    Renal disorder     Seizures     Self-harming behavior     Suicide attempt     Therapy     Thrombocytopenia 6/15/2015       Past Surgical History:   Procedure Laterality Date    COSMETIC SURGERY      EMBOLIZATION N/A 6/11/2023    Procedure: EMBOLIZATION, BLOOD VESSEL;  Surgeon: Debbie Surgeon;  [facility]: [facility];  Service: Anesthesiology;  Laterality: N/A;    ESOPHAGOGASTRODUODENOSCOPY      ESOPHAGOGASTRODUODENOSCOPY N/A 7/6/2023    Procedure: EGD (ESOPHAGOGASTRODUODENOSCOPY);  Surgeon: Bernice Guillen MD;  Location: [address]);  Service: Endoscopy;  Laterality: N/A;    ESOPHAGOGASTRODUODENOSCOPY N/A 7/11/2023    Procedure: EGD (ESOPHAGOGASTRODUODENOSCOPY);  Surgeon: Rangel Broussard MD;  Location: [address]);  Service: Endoscopy;  Laterality: N/A;       Review of patient's allergies indicates:   Allergen Reactions    Sulfa (sulfonamide antibiotics) Rash    Codeine Nausea And Vomiting       Current Neurological Medications: see below.    No current facility-administered medications on file prior to  encounter.     Current Outpatient Medications on File Prior to Encounter   Medication Sig    ALPRAZolam (XANAX) 0.25 MG tablet Take 1 tablet (0.25 mg total) by mouth 2 (two) times daily as needed for Anxiety.    brimonidine-timoloL (COMBIGAN) 0.2-0.5 % Drop Place 1 drop into both eyes 2 (two) times a day.    ferrous sulfate (FEOSOL) 325 mg (65 mg iron) Tab tablet Take 325 mg by mouth once daily.    furosemide (LASIX) 20 MG tablet Take 20 mg by mouth once daily.    levetiracetam 500 mg/5 mL (5 mL) Soln Take 10 mLs (1,000 mg total) by mouth 2 (two) times daily.    lithium (LITHOBID) 300 MG CR tablet Take 1 tablet (300 mg total) by mouth every evening. (Patient taking differently: Take 600 mg by mouth every evening.)    miconazole nitrate 2% (MICOTIN) 2 % Oint Apply topically 2 (two) times daily.    OLANZapine (ZYPREXA) 5 MG tablet Take 1 tablet (5 mg total) by mouth every evening.    pantoprazole (PROTONIX) 40 mg suspension Take 1 packet (40 mg total) by mouth 2 (two) times daily.    rifAXIMin (XIFAXAN) 550 mg Tab Take 1 tablet (550 mg total) by mouth 2 (two) times daily.    spironolactone (ALDACTONE) 100 MG tablet Take 100 mg by mouth once daily.     Family History       Problem Relation (Age of Onset)    Alcohol abuse Father, Sister, Paternal Grandfather    Bipolar disorder Father    Hypertension Mother    Suicide Father          Tobacco Use    Smoking status: Never    Smokeless tobacco: Never   Substance and Sexual Activity    Alcohol use: Not Currently     Comment: hx of ETOH abuse with cirrhosis of liver    Drug use: No    Sexual activity: Not Currently     Review of Systems   Unable to perform ROS: Mental status change     Objective:     Vital Signs (Most Recent):  Temp: 99.7 °F (37.6 °C) (01/09/24 1220)  Pulse: 90 (01/09/24 1220)  Resp: 16 (01/09/24 1220)  BP: (!) 144/66 (01/09/24 1220)  SpO2: (!) 94 % (01/09/24 1220) Vital Signs (24h Range):  Temp:  [97.5 °F (36.4 °C)-100.3 °F (37.9 °C)] 99.7 °F (37.6  "°C)  Pulse:  [] 90  Resp:  [14-18] 16  SpO2:  [88 %-99 %] 94 %  BP: (116-176)/(57-80) 144/66     Weight: 56.2 kg (123 lb 14.4 oz)  Body mass index is 21.95 kg/m².     Physical Exam  Vitals and nursing note reviewed.   Constitutional:       Appearance: She is ill-appearing.   HENT:      Head: Normocephalic and atraumatic.   Eyes:      General: Scleral icterus present.   Cardiovascular:      Pulses: Normal pulses.      Heart sounds: Normal heart sounds.   Pulmonary:      Effort: Pulmonary effort is normal.   Skin:     Coloration: Skin is jaundiced.     Neurological Exam:  MENTAL STATUS  Level of consciousness: alert  Orientation: oriented to person and place ("I am in the hospital") only  Attention: poor  Concentration: poor  Speech: normal    No forced gaze deviation. Follows commands despite poor concentration/attention. No convulsions. No automatisms observed.    CRANIAL NERVES  CN II: visual fields full to confrontation  CN III, IV, VI: PERRL, EOMI  CN VII: facial expression symmetric and full  CN IX, X: symmetric palate elevation; phonation normal  CN XI: shoulder shrug and head turn intact bilaterally  CN XII: tongue midline, no deviation upon protrusion    MOTOR EXAM  Muscle bulk: reduced  Pronator drift: absent    Strength - Upper Extremities   Arm abduction Elbow flexion Elbow extension   Right 4/5 4/5 4/5   Left 4/5 4/5 4/5     Strength - Lower Extremities   Hip flexion Knee flexion Knee extension   Right 3/5 3/5 3/5   Left 3/5 3/5 3/5     REFLEXES   Biceps Brachioradialis Patellar Achilles   Right +3 +3 +2 +2   Left +3 +3 +2 +2     Planter reflex: down-going bilaterally. No clonus.    SENSORY EXAM  Light touch: patient reports "I cannot feel anywhere." Withdraws to pain throughout all 4 extremities.    COORDINATION  Tremor: asterixis, generalized, can see in hands and feet  Gait: deferred              Significant Labs: All pertinent lab results from the past 24 hours have been reviewed.    Significant " Imaging: I have reviewed all pertinent imaging results/findings within the past 24 hours.

## 2024-01-09 NOTE — ASSESSMENT & PLAN NOTE
Appreciate palliative care recommendations and meeting with family that is her sister.  Family in agreement to make her DNR.  Documentation in place.  Code status updated    Another meeting with sister by palliative. More meetings needed, sister to come her pablo Saturday from [place]

## 2024-01-09 NOTE — PLAN OF CARE
10:47 AM  The SW received a secure chat from [facility] stating that this patient's sister is looking for another placement. The SW contacted the patient's sister and explained to her the policy regarding changing nursing facility due to preference. The patient's sister informed the SW that she was trying to get her sister into [facility] in [place], [place]. The SW tried to explain the criteria of a assisted living program. The patient's sister informed the SW that she was been speaking to the representatives closer to her and they told her that her sister would have the supports needed. The SW informed the patient's sister that a MICHAEL would have to be signed in order to release her sister's information to [facility]. The patient's sister reported that she was going to get in contact with the representatives from the [facility].    The SW provided the patient's care team a update regarding the above information.     Logan Modi LMSW  Case Management [facility]

## 2024-01-09 NOTE — PT/OT/SLP EVAL
Speech Language Pathology Evaluation  Bedside Swallow    Patient Name:  Marely Hamilton   MRN:  [id number]  Admitting Diagnosis: Decompensated hepatic cirrhosis    Recommendations:                 General Recommendations:   Monitor diet tolerance   Diet recommendations:  Minced & Moist Diet - IDDSI Level 5, Thin liquids - IDDSI Level 0   Aspiration Precautions: 1 bite/sip at a time, Alternating bites/sips, Feed only when awake/alert, HOB to 90 degrees, Meds crushed in puree, Small bites/sips, and Standard aspiration precautions   General Precautions: Standard, aspiration, fall  Communication strategies:  go to room if call light pushed    Assessment:     Marely Hamilton is a 58 y.o. female with an SLP diagnosis of Dysphagia.     History:     Past Medical History:   Diagnosis Date    Addiction to drug     Alcohol abuse     Alcohol abuse, in remission 6/15/2015    14.5 weeks ago; AA weekly    Anemia     Anxiety 6/15/2015    Behavioral problem     Bipolar disorder     Bipolar disorder in remission 6/15/2015    Cirrhosis, Laennec's 6/15/2015    Depression     Encounter for blood transfusion     Epistaxis 6/15/2015    Fatigue     Glaucoma     Hematuria     Hepatic encephalopathy 6/15/2015    Hepatic enlargement     History of psychiatric care     History of psychiatric hospitalization     History of seizure 6/15/2015    1    hx of intentional Tylenol overdose 6/15/2015    2005- situational and hx of bipolar    Hx of psychiatric care     Macrocytic anemia 9/18/2015    6 units PRBC since June 2015    Jeana     Osteoarthritis     Other ascites 6/15/2015    Psychiatric exam requested by authority     Psychiatric problem     Psychosis 9/26/2019    Renal disorder     Seizures     Self-harming behavior     Suicide attempt     Therapy     Thrombocytopenia 6/15/2015     Past Surgical History:   Procedure Laterality Date    COSMETIC SURGERY      EMBOLIZATION N/A 6/11/2023    Procedure: EMBOLIZATION, BLOOD VESSEL;  Surgeon: Debbie  "Surgeon;  Location: [facility];  Service: Anesthesiology;  Laterality: N/A;    ESOPHAGOGASTRODUODENOSCOPY      ESOPHAGOGASTRODUODENOSCOPY N/A 7/6/2023    Procedure: EGD (ESOPHAGOGASTRODUODENOSCOPY);  Surgeon: Bernice Guillen MD;  Location: [facility] (2ND FLR);  Service: Endoscopy;  Laterality: N/A;    ESOPHAGOGASTRODUODENOSCOPY N/A 7/11/2023    Procedure: EGD (ESOPHAGOGASTRODUODENOSCOPY);  Surgeon: Rangel Broussard MD;  Location: [facility] ENDO (2ND FLR);  Service: Endoscopy;  Laterality: N/A;     Principal Problem:Acute liver failure without hepatic coma     Chief Complaint:        Chief Complaint   Patient presents with    Ascites       From [facility] for abd distention, BLE swelling, and jaudice. Hx of cirrhosis.       HPI: "Patient is a 58-year-old woman with past medical history significant for alcoholic cirrhosis, bipolar disorder, chronic anemia, thrombocytopenia, cholelithiasis, DVT with IVC filter in place, prior GI bleeds, ESBL UTI history, seizure disorder presenting from [facility] for evaluation of abdominal distension, peripheral edema, and jaundice. "    Prior Intubation HX:  none    Modified Barium Swallow: 7/18/23: "Patient demonstrates mild Oropharyngeal  dysphagia characterized by high risk for aspiration with thin liquids and residue with purees. Cognitive status and impulsivity also increase pt aspiration risk during meals. Safest diet at this time appears nectar thickened liquids and purees, with meds crushed in puree. "    Chest X-Rays: 1/9:"New/worse right greater than left pulmonary opacities suggestive of aspiration given reported clinical concern for aspiration.  Small right pleural effusion is also suspected. "    Prior diet: minced and moist/thin liquids    Subjective     Spoke with RN prior to session who reports pt had coughing episode when taking medications whole with sips of water last night. Pt awake/alert and agreeable to ST.     Pain/Comfort:  Pain Rating 1: " 0/10  Pain Rating Post-Intervention 1: 0/10    Respiratory Status: Nasal cannula, flow 3 L/min    Objective:     Oral Musculature Evaluation  Oral Musculature: general weakness  Dentition: present and adequate  Secretion Management: adequate  Mucosal Quality: adequate  Mandibular Strength and Mobility: WFL  Oral Labial Strength and Mobility: functional seal  Lingual Strength and Mobility: impaired strength, impaired left lateral movement, impaired right lateral movement, impaired protrusion  Volitional Cough: weak  Volitional Swallow: present  Voice Prior to PO Intake: clear    Bedside Swallow Eval:   Consistencies Assessed:  Thin liquids ice chip x3, 4 oz from cup rim and straw- single sips   Puree tsp x  Solids cracker x1/2      Oral Phase:   Prolonged mastication  Oral residue  Slow oral transit time    Pharyngeal Phase:   no overt clinical signs/symptoms of aspiration  no overt clinical signs/symptoms of pharyngeal dysphagia  Double swallow with larger bites/sips    Compensatory Strategies  Small bites/sips    Treatment: HOB raised to 90 degrees. Pt able to self present with cues and assist. She tolerated small bites/sips without gia overt s/sx of airway compromise, though given generalized weakness and recent choking episode, would recommend she continue her current minced and moist solids and thin liquids diet with medications crushed in puree. Education provided re: role of SLP, diet recs, swallow precs, s/s aspiration and POC.  Pt verbalized understanding though requires ongoing reinforcement. RN updated post session.  SLP will continue to follow.    Goals:   Multidisciplinary Problems       SLP Goals          Problem: SLP    Goal Priority Disciplines Outcome   SLP Goal     SLP Ongoing, Progressing   Description: Speech Language Pathology Goals  Goals expected to be met by 1/23    1. Pt will tolerate least restrictive PO diet without any overt s/sx of airway compromise.                              Plan:      Patient to be seen:  3 x/week   Plan of Care expires:  02/08/24  Plan of Care reviewed with:  patient   SLP Follow-Up:  Yes       Discharge recommendations:    Low intensity   Barriers to Discharge:  Level of Skilled Assistance Needed      Time Tracking:     SLP Treatment Date:   01/09/24  Speech Start Time:  1017  Speech Stop Time:  1030     Speech Total Time (min):  13 min    Billable Minutes: Eval Swallow and Oral Function 5 and Self Care/Home Management Training 8    01/09/2024

## 2024-01-09 NOTE — ASSESSMENT & PLAN NOTE
Patient with known Cirrhosis with Child's class C. Co-morbidities are present and inclusive of ascites, malnutrition, anemia/pancytopenia, and Pleural effusion .  MELD-Na score calculated; MELD 3.0: 30 at 1/8/2024  6:27 AM  MELD-Na: 25 at 1/8/2024  6:27 AM  Calculated from:  Serum Creatinine: 0.4 mg/dL (Using min of 1 mg/dL) at 1/8/2024  6:27 AM  Serum Sodium: 144 mmol/L (Using max of 137 mmol/L) at 1/8/2024  6:27 AM  Total Bilirubin: 17.3 mg/dL at 1/8/2024  6:27 AM  Serum Albumin: 1.9 g/dL at 1/8/2024  6:27 AM  INR(ratio): 2.0 at 1/8/2024  6:27 AM  Age at listing (hypothetical): 58 years  Sex: Female at 1/8/2024  6:27 AM      Continue chronic meds. Etiology likely ETOH. Will avoid any hepatotoxic meds, and monitor CBC/CMP/INR for synthetic function.     Presenting with decompensation (increased ascites, Bili 12, ammonia 114).  Status post thoracentesis of 650 mL.  Awaiting cultures.  Could not find a pocket for paracentesis.  Continue Rocephin for now    -restart lactulose as she has been noncompliant  Appreciate hepatology recommendations.      Recommendations to include physical therapy occupational therapy, palliative care discussions for goals of care.  She has not a candidate for transplantation given frailty, poor social support and noncompliance with medication

## 2024-01-09 NOTE — PLAN OF CARE
Patient orientation waxes and wanes. Patient currently only oriented to self.   Speech and occupational therapy consulted and evaluated patient.   Follow commands appropriately.   Palliative care is following.   Hepatology following.   PO lactulose continued per order.   Patient is incontinent of urine and bowels.   20 mg IV lasix push administered per order.   Sacral redness, cream applied, wound care following.   Frequent weight shifts. Q2h turns  Patient eating 50-75% of meals with assistance.   Bed is in lowest position with wheels locked.   Call light remains within reach.   Plan of care is ongoing.

## 2024-01-10 LAB
ALBUMIN SERPL BCP-MCNC: 1.8 G/DL (ref 3.5–5.2)
ALP SERPL-CCNC: 136 U/L (ref 55–135)
ALT SERPL W/O P-5'-P-CCNC: 25 U/L (ref 10–44)
ANION GAP SERPL CALC-SCNC: 6 MMOL/L (ref 8–16)
ANION GAP SERPL CALC-SCNC: 7 MMOL/L (ref 8–16)
ANISOCYTOSIS BLD QL SMEAR: SLIGHT
AST SERPL-CCNC: 47 U/L (ref 10–40)
BASOPHILS # BLD AUTO: 0 K/UL (ref 0–0.2)
BASOPHILS NFR BLD: 0 % (ref 0–1.9)
BILIRUB SERPL-MCNC: 18.1 MG/DL (ref 0.1–1)
BUN SERPL-MCNC: 10 MG/DL (ref 6–20)
BUN SERPL-MCNC: 11 MG/DL (ref 6–20)
CALCIUM SERPL-MCNC: 8.5 MG/DL (ref 8.7–10.5)
CALCIUM SERPL-MCNC: 8.8 MG/DL (ref 8.7–10.5)
CHLORIDE SERPL-SCNC: 116 MMOL/L (ref 95–110)
CHLORIDE SERPL-SCNC: 119 MMOL/L (ref 95–110)
CO2 SERPL-SCNC: 21 MMOL/L (ref 23–29)
CO2 SERPL-SCNC: 24 MMOL/L (ref 23–29)
CREAT SERPL-MCNC: 0.4 MG/DL (ref 0.5–1.4)
CREAT SERPL-MCNC: 0.5 MG/DL (ref 0.5–1.4)
DACRYOCYTES BLD QL SMEAR: ABNORMAL
DIFFERENTIAL METHOD BLD: ABNORMAL
EOSINOPHIL # BLD AUTO: 0 K/UL (ref 0–0.5)
EOSINOPHIL NFR BLD: 0.9 % (ref 0–8)
ERYTHROCYTE [DISTWIDTH] IN BLOOD BY AUTOMATED COUNT: 15.9 % (ref 11.5–14.5)
EST. GFR  (NO RACE VARIABLE): >60 ML/MIN/1.73 M^2
EST. GFR  (NO RACE VARIABLE): >60 ML/MIN/1.73 M^2
GLUCOSE SERPL-MCNC: 144 MG/DL (ref 70–110)
GLUCOSE SERPL-MCNC: 89 MG/DL (ref 70–110)
HCT VFR BLD AUTO: 25.9 % (ref 37–48.5)
HGB BLD-MCNC: 8 G/DL (ref 12–16)
IMM GRANULOCYTES # BLD AUTO: 0.02 K/UL (ref 0–0.04)
IMM GRANULOCYTES NFR BLD AUTO: 0.4 % (ref 0–0.5)
INR PPP: 2.4 (ref 0.8–1.2)
LYMPHOCYTES # BLD AUTO: 0.4 K/UL (ref 1–4.8)
LYMPHOCYTES NFR BLD: 8.4 % (ref 18–48)
MAGNESIUM SERPL-MCNC: 1.9 MG/DL (ref 1.6–2.6)
MCH RBC QN AUTO: 38.5 PG (ref 27–31)
MCHC RBC AUTO-ENTMCNC: 30.9 G/DL (ref 32–36)
MCV RBC AUTO: 125 FL (ref 82–98)
MONOCYTES # BLD AUTO: 0.4 K/UL (ref 0.3–1)
MONOCYTES NFR BLD: 8.4 % (ref 4–15)
NEUTROPHILS # BLD AUTO: 3.7 K/UL (ref 1.8–7.7)
NEUTROPHILS NFR BLD: 81.9 % (ref 38–73)
NRBC BLD-RTO: 0 /100 WBC
OSMOLALITY SERPL: 308 MOSM/KG (ref 275–295)
OVALOCYTES BLD QL SMEAR: ABNORMAL
PHOSPHATE SERPL-MCNC: 2.2 MG/DL (ref 2.7–4.5)
PLATELET # BLD AUTO: 47 K/UL (ref 150–450)
PLATELET BLD QL SMEAR: ABNORMAL
PMV BLD AUTO: 9 FL (ref 9.2–12.9)
POIKILOCYTOSIS BLD QL SMEAR: SLIGHT
POLYCHROMASIA BLD QL SMEAR: ABNORMAL
POTASSIUM SERPL-SCNC: 2.9 MMOL/L (ref 3.5–5.1)
POTASSIUM SERPL-SCNC: 2.9 MMOL/L (ref 3.5–5.1)
PROT SERPL-MCNC: 4.6 G/DL (ref 6–8.4)
PROTHROMBIN TIME: 24 SEC (ref 9–12.5)
RBC # BLD AUTO: 2.08 M/UL (ref 4–5.4)
SODIUM SERPL-SCNC: 146 MMOL/L (ref 136–145)
SODIUM SERPL-SCNC: 147 MMOL/L (ref 136–145)
WBC # BLD AUTO: 4.5 K/UL (ref 3.9–12.7)

## 2024-01-10 PROCEDURE — 80048 BASIC METABOLIC PNL TOTAL CA: CPT | Mod: XB | Performed by: STUDENT IN AN ORGANIZED HEALTH CARE EDUCATION/TRAINING PROGRAM

## 2024-01-10 PROCEDURE — 85610 PROTHROMBIN TIME: CPT | Performed by: STUDENT IN AN ORGANIZED HEALTH CARE EDUCATION/TRAINING PROGRAM

## 2024-01-10 PROCEDURE — 95720 EEG PHY/QHP EA INCR W/VEEG: CPT | Mod: ,,, | Performed by: PSYCHIATRY & NEUROLOGY

## 2024-01-10 PROCEDURE — 99900035 HC TECH TIME PER 15 MIN (STAT)

## 2024-01-10 PROCEDURE — 25000003 PHARM REV CODE 250: Performed by: STUDENT IN AN ORGANIZED HEALTH CARE EDUCATION/TRAINING PROGRAM

## 2024-01-10 PROCEDURE — 80053 COMPREHEN METABOLIC PANEL: CPT | Performed by: STUDENT IN AN ORGANIZED HEALTH CARE EDUCATION/TRAINING PROGRAM

## 2024-01-10 PROCEDURE — 20600001 HC STEP DOWN PRIVATE ROOM

## 2024-01-10 PROCEDURE — 95714 VEEG EA 12-26 HR UNMNTR: CPT

## 2024-01-10 PROCEDURE — 92526 ORAL FUNCTION THERAPY: CPT

## 2024-01-10 PROCEDURE — 94761 N-INVAS EAR/PLS OXIMETRY MLT: CPT

## 2024-01-10 PROCEDURE — 27000646 HC AEROBIKA DEVICE

## 2024-01-10 PROCEDURE — 27000221 HC OXYGEN, UP TO 24 HOURS

## 2024-01-10 PROCEDURE — 95700 EEG CONT REC W/VID EEG TECH: CPT

## 2024-01-10 PROCEDURE — 83735 ASSAY OF MAGNESIUM: CPT | Performed by: STUDENT IN AN ORGANIZED HEALTH CARE EDUCATION/TRAINING PROGRAM

## 2024-01-10 PROCEDURE — 36415 COLL VENOUS BLD VENIPUNCTURE: CPT | Mod: XB | Performed by: STUDENT IN AN ORGANIZED HEALTH CARE EDUCATION/TRAINING PROGRAM

## 2024-01-10 PROCEDURE — 4A10X4Z MONITORING OF CENTRAL NERVOUS ELECTRICAL ACTIVITY, EXTERNAL APPROACH: ICD-10-PCS | Performed by: PSYCHIATRY & NEUROLOGY

## 2024-01-10 PROCEDURE — 83930 ASSAY OF BLOOD OSMOLALITY: CPT | Performed by: STUDENT IN AN ORGANIZED HEALTH CARE EDUCATION/TRAINING PROGRAM

## 2024-01-10 PROCEDURE — 97535 SELF CARE MNGMENT TRAINING: CPT

## 2024-01-10 PROCEDURE — 85025 COMPLETE CBC W/AUTO DIFF WBC: CPT | Performed by: STUDENT IN AN ORGANIZED HEALTH CARE EDUCATION/TRAINING PROGRAM

## 2024-01-10 PROCEDURE — P9045 ALBUMIN (HUMAN), 5%, 250 ML: HCPCS | Mod: JG | Performed by: STUDENT IN AN ORGANIZED HEALTH CARE EDUCATION/TRAINING PROGRAM

## 2024-01-10 PROCEDURE — 63600175 PHARM REV CODE 636 W HCPCS: Performed by: STUDENT IN AN ORGANIZED HEALTH CARE EDUCATION/TRAINING PROGRAM

## 2024-01-10 PROCEDURE — 99233 SBSQ HOSP IP/OBS HIGH 50: CPT | Mod: GC,,, | Performed by: PSYCHIATRY & NEUROLOGY

## 2024-01-10 PROCEDURE — 36415 COLL VENOUS BLD VENIPUNCTURE: CPT | Performed by: STUDENT IN AN ORGANIZED HEALTH CARE EDUCATION/TRAINING PROGRAM

## 2024-01-10 PROCEDURE — 94664 DEMO&/EVAL PT USE INHALER: CPT

## 2024-01-10 PROCEDURE — 94668 MNPJ CHEST WALL SBSQ: CPT

## 2024-01-10 PROCEDURE — 84100 ASSAY OF PHOSPHORUS: CPT | Performed by: STUDENT IN AN ORGANIZED HEALTH CARE EDUCATION/TRAINING PROGRAM

## 2024-01-10 RX ORDER — POTASSIUM CHLORIDE 7.45 MG/ML
10 INJECTION INTRAVENOUS
Status: COMPLETED | OUTPATIENT
Start: 2024-01-10 | End: 2024-01-11

## 2024-01-10 RX ORDER — ALBUMIN HUMAN 50 G/1000ML
25 SOLUTION INTRAVENOUS ONCE
Status: DISCONTINUED | OUTPATIENT
Start: 2024-01-10 | End: 2024-01-10 | Stop reason: SDUPTHER

## 2024-01-10 RX ORDER — SODIUM,POTASSIUM PHOSPHATES 280-250MG
1 POWDER IN PACKET (EA) ORAL ONCE
Status: COMPLETED | OUTPATIENT
Start: 2024-01-10 | End: 2024-01-10

## 2024-01-10 RX ORDER — POTASSIUM CHLORIDE 7.45 MG/ML
20 INJECTION INTRAVENOUS ONCE
Status: COMPLETED | OUTPATIENT
Start: 2024-01-10 | End: 2024-01-10

## 2024-01-10 RX ORDER — POTASSIUM CHLORIDE 20 MEQ/1
40 TABLET, EXTENDED RELEASE ORAL EVERY 4 HOURS
Status: COMPLETED | OUTPATIENT
Start: 2024-01-10 | End: 2024-01-10

## 2024-01-10 RX ORDER — ALBUMIN HUMAN 50 G/1000ML
12.5 SOLUTION INTRAVENOUS ONCE
Status: COMPLETED | OUTPATIENT
Start: 2024-01-10 | End: 2024-01-10

## 2024-01-10 RX ADMIN — LACTULOSE 20 G: 20 SOLUTION ORAL at 08:01

## 2024-01-10 RX ADMIN — PANTOPRAZOLE SODIUM 40 MG: 40 GRANULE, DELAYED RELEASE ORAL at 08:01

## 2024-01-10 RX ADMIN — THERA TABS 1 TABLET: TAB at 08:01

## 2024-01-10 RX ADMIN — RIFAXIMIN 550 MG: 550 TABLET ORAL at 08:01

## 2024-01-10 RX ADMIN — POTASSIUM & SODIUM PHOSPHATES POWDER PACK 280-160-250 MG 1 PACKET: 280-160-250 PACK at 10:01

## 2024-01-10 RX ADMIN — PIPERACILLIN SODIUM AND TAZOBACTAM SODIUM 4.5 G: 4; .5 INJECTION, POWDER, FOR SOLUTION INTRAVENOUS at 05:01

## 2024-01-10 RX ADMIN — FUROSEMIDE 20 MG: 20 TABLET ORAL at 08:01

## 2024-01-10 RX ADMIN — LEVETIRACETAM 1000 MG: 100 SOLUTION ORAL at 08:01

## 2024-01-10 RX ADMIN — PIPERACILLIN SODIUM AND TAZOBACTAM SODIUM 4.5 G: 4; .5 INJECTION, POWDER, FOR SOLUTION INTRAVENOUS at 12:01

## 2024-01-10 RX ADMIN — CHOLECALCIFEROL TAB 25 MCG (1000 UNIT) 2000 UNITS: 25 TAB at 08:01

## 2024-01-10 RX ADMIN — POTASSIUM CHLORIDE 40 MEQ: 1500 TABLET, EXTENDED RELEASE ORAL at 03:01

## 2024-01-10 RX ADMIN — POTASSIUM CHLORIDE 20 MEQ: 7.46 INJECTION, SOLUTION INTRAVENOUS at 11:01

## 2024-01-10 RX ADMIN — FOLIC ACID 1 MG: 1 TABLET ORAL at 08:01

## 2024-01-10 RX ADMIN — POTASSIUM CHLORIDE 40 MEQ: 1500 TABLET, EXTENDED RELEASE ORAL at 11:01

## 2024-01-10 RX ADMIN — LACTULOSE 20 G: 20 SOLUTION ORAL at 03:01

## 2024-01-10 RX ADMIN — LITHIUM CARBONATE 300 MG: 300 TABLET, FILM COATED, EXTENDED RELEASE ORAL at 08:01

## 2024-01-10 RX ADMIN — POTASSIUM CHLORIDE 10 MEQ: 7.46 INJECTION, SOLUTION INTRAVENOUS at 10:01

## 2024-01-10 RX ADMIN — POTASSIUM CHLORIDE 10 MEQ: 7.46 INJECTION, SOLUTION INTRAVENOUS at 11:01

## 2024-01-10 RX ADMIN — PIPERACILLIN SODIUM AND TAZOBACTAM SODIUM 4.5 G: 4; .5 INJECTION, POWDER, FOR SOLUTION INTRAVENOUS at 08:01

## 2024-01-10 RX ADMIN — Medication 100 MG: at 08:01

## 2024-01-10 RX ADMIN — OLANZAPINE 2.5 MG: 2.5 TABLET, FILM COATED ORAL at 08:01

## 2024-01-10 RX ADMIN — ALBUMIN (HUMAN) 12.5 G: 12.5 SOLUTION INTRAVENOUS at 08:01

## 2024-01-10 NOTE — HOSPITAL COURSE
1/10/24: on EEG, no seizures thus far; mentation continues to wax/wane--she is alert and oriented x0 this morning. Remains on 1g Keppra BID.  1/11/24: EEG demonstrates generalized slowing consistent with encephalopathy; alert and oriented to person this morning; able to move all four extremities and follow commands; some abnormal facial movements (brijesh-oral) observed.

## 2024-01-10 NOTE — ASSESSMENT & PLAN NOTE
58 year-old female with a complex past medical history including alcoholic cirrhosis complicated by acute decompensated liver failure, hepatic cirrhosis, anemia and thrombocytopenia further complicated by GI bleed, retroperitoneal bleed. Additional history includes seizure disorder and bipolar disorder. Multiple prior hospitalizations within the last year for encephalopathy (either hepatic and/or in setting of sepsis or other major medical events). Notably was hospitalized from 5/28/23 to 6/30/23 for hepatic encephalopathy vs non-convulsive status epilepticus: EEGs demonstrated predominantly triphasics/encephalopathy--AED regimen was de-escalated from Zonisamide, Vimpat and Keppra to Keppra 1000mg BID (was discharged on this). Currently hospitalized for acute decompensated cirrhosis and hepatic encephalopathy (also with pleural effusion: likely hepatic hydrothorax) in setting of presumed non-compliance with home lactulose. Baseline unknown per primary, however was reportedly alert and oriented to person and place on admission. Mentation has waxed/waned but primary says that she has more consistently been oriented only to person for the last three days. Has also been exhibiting generalized tremor, which per primary team is new. Hospital stay has been further complicated by aspiration event on 1/8/23, requiring escalation of antibiotics: Rocephin (SBP ppx) to Zosyn. Neurology consulted due to tremor, encephalopathy, questioning whether EEG or AED adjustment is necessary. On labs, ammonia elevated, Lithium level borderline low, Keppra level in process (ordered after admission). Physical exam is notable for profound jaundice, asterixis (hands/feet). She is oriented to person/place at time of evaluation. Attention is poor, but does follow commands: has more difficulty with multi-step commands. No clonus, Babinski downgoing. No gaze deviation, automatisms, convulsions observed at bedside. With this in mind, doubt that  encephalopathy is due to seizure: more likely due to toxic-metabolic +/- infection (recent aspiration event). Further agree that malnutrition/debility also likely contributes (ie has poor physiologic reserve). Likely also has poor neurologic reserve in setting of recurrent episodes of hepatic encephalopathy requiring multiple hospitalizations, making it more difficult for her to tolerate toxic-metabolic, etc. abnormalities. Regardless, in setting of history of seizure disorder, will still need to rule out with EEG.    Exam continues to wax and wane. On EEG as of morning of 1/10/23. No seizure activity thus far per preliminary report.    Recommendations  -on continuous EEG: follow-up formal report  -keep Keppra at 1000mg BID  ->follow up Keppra level  -please continue to treat toxic/metabolic and infectious abnormalities  -neurology will continue to follow  -contact us with questions/concerns

## 2024-01-10 NOTE — SUBJECTIVE & OBJECTIVE
Subjective:     Interval History: 1/10/24: on EEG, no seizures thus far; mentation continues to wax/wane--she is alert and oriented x0 this morning. Remains on 1g Keppra BID.    Current Neurological Medications: see below.    Current Facility-Administered Medications   Medication Dose Route Frequency Provider Last Rate Last Admin    ALPRAZolam tablet 0.25 mg  0.25 mg Oral BID PRN Robert Mock MD   0.25 mg at 01/06/24 1743    folic acid tablet 1 mg  1 mg Oral Daily Kaylee Chavez MD   1 mg at 01/10/24 0857    furosemide tablet 20 mg  20 mg Oral Daily Kaylee Chavez MD   20 mg at 01/10/24 0857    glucagon (human recombinant) injection 1 mg  1 mg Intramuscular PRN Robert Mock MD        glucose chewable tablet 16 g  16 g Oral PRN Robert Mock MD        glucose chewable tablet 24 g  24 g Oral PRN Robert Mock MD        insulin aspart U-100 pen 0-5 Units  0-5 Units Subcutaneous QID (AC + HS) PRN Robert Mock MD        lactulose 20 gram/30 mL solution Soln 20 g  20 g Oral TID Kaylee Chavez MD   20 g at 01/10/24 0857    levetiracetam 500 mg/5 mL (5 mL) liquid Soln 1,000 mg  1,000 mg Oral BID Robert Mock MD   1,000 mg at 01/10/24 0857    lithium CR tablet 300 mg  300 mg Oral QHS Robert Mock MD   300 mg at 01/09/24 2044    multivitamin tablet  1 tablet Oral Daily Kaylee Chavez MD   1 tablet at 01/10/24 0857    OLANZapine tablet 2.5 mg  2.5 mg Oral QHS Kaylee Chavez MD   2.5 mg at 01/09/24 2044    ondansetron injection 4 mg  4 mg Intravenous Q6H PRN Fernando Anderson MD   4 mg at 01/09/24 0135    pantoprazole suspension 40 mg  40 mg Oral BID Robert Mock MD   40 mg at 01/10/24 0857    piperacillin-tazobactam (ZOSYN) 4.5 g in dextrose 5 % in water (D5W) 100 mL IVPB (MB+)  4.5 g Intravenous Q8H Kaylee Chavez MD   Stopped at 01/10/24 1257    potassium chloride SA CR tablet 40 mEq  40 mEq Oral Q4H Kaylee Chavez MD   40 mEq at 01/10/24 1120    prochlorperazine injection Soln 5 mg  5 mg  Intravenous Q6H PRN Fernando Anderson MD        rifAXIMin tablet 550 mg  550 mg Oral BID Robert Mock MD   550 mg at 01/10/24 0857    sodium chloride 0.9% flush 10 mL  10 mL Intravenous PRN Robert Mock MD        thiamine tablet 100 mg  100 mg Oral Daily Robert Mock MD   100 mg at 01/10/24 0857    vitamin D 1000 units tablet 2,000 Units  2,000 Units Oral Daily Robert Mock MD   2,000 Units at 01/10/24 0857       Review of Systems   Unable to perform ROS: Mental status change     Objective:     Vital Signs (Most Recent):  Temp: 99.3 °F (37.4 °C) (01/10/24 1110)  Pulse: 82 (01/10/24 1148)  Resp: 14 (01/10/24 1110)  BP: (!) 120/59 (01/10/24 1110)  SpO2: 95 % (01/10/24 1110) Vital Signs (24h Range):  Temp:  [98.5 °F (36.9 °C)-99.3 °F (37.4 °C)] 99.3 °F (37.4 °C)  Pulse:  [75-96] 82  Resp:  [14-18] 14  SpO2:  [93 %-99 %] 95 %  BP: ()/(52-61) 120/59     Weight: 58.1 kg (128 lb 1.4 oz)  Body mass index is 22.69 kg/m².     Physical Exam   Physical Exam  Vitals and nursing note reviewed.   Constitutional:       Appearance: She is ill-appearing.   HENT:      Head: Normocephalic and atraumatic.   Eyes:      General: Scleral icterus present.   Cardiovascular:      Pulses: Normal pulses.      Heart sounds: Normal heart sounds.   Pulmonary:      Effort: Pulmonary effort is normal.   Skin:     Coloration: Skin is jaundiced.      Neurological Exam:  MENTAL STATUS  Level of consciousness: alert  Orientation: oriented x0  Attention: poor  Concentration: poor  Speech: normal     No forced gaze deviation. Does not follow commands. No convulsions. No automatisms observed.     CRANIAL NERVES  CN II: blinks to threat  CN III, IV, VI: PERRL  CN VII: facial expression symmetric and full    Unable to follow commands or answer questions for remainder of exam     MOTOR EXAM  Muscle bulk: reduced throughout     Strength: unable to assess today due to inability to follow commands.     REFLEXES    Biceps  Brachioradialis Patellar Achilles   Right +3 +3 +2 +2   Left +3 +3 +2 +2      Planter reflex: down-going bilaterally. No clonus.     SENSORY EXAM  Withdraws to pain throughout all 4 extremities.     COORDINATION  Tremor: asterixis, generalized, can see in hands and feet while in repose  Gait: deferred       Significant Labs: All pertinent lab results from the past 24 hours have been reviewed.    Significant Imaging: I have reviewed all pertinent imaging results/findings within the past 24 hours.

## 2024-01-10 NOTE — PROGRESS NOTES
[facility] - [facility] Stepdown  Neurology  Progress Note    Patient Name: Marely Hamilton  MRN: [id number]  Admission Date: 1/4/2024  Hospital Length of Stay: 5 days  Code Status: DNR   Attending Provider: Kaylee Chavez MD  Primary Care Physician: Viktor Ross MD   Principal Problem:Decompensated hepatic cirrhosis    HPI:   This is a 58 year-old female with a complex past medical history including alcoholic cirrhosis (complicated by anemia, thrombocytopenia, hepatic encephalopathy, medication non-compliance), DVT, retroperitoneal hemorrhage, bleeding duodenal ulcer, ESBL UTI, seizure disorder, bipolar disorder. She has been hospitalized multiple times in 2023: 8/31-9/7--hepatic encephalopathy; 8/14-8/18--UTI with sepsis and encephalopathy; 7/15-8/3--bleeding duodenal ulcer requiring IR/GI intervention, hospital course complicated by treatment resistant UTI; 5/28-6/30--extended hospital stay, presented for hepatic encephalopathy vs non-convulsive status epilepticus, transferred from OSH, was intubated and put on propofol however EEGs generally demonstrated triphasic waves, she was taken off propofol and her AED regimen was simplified from Keppra, zonisamide, and Vimpat to just Keppra, discharged only on Keppra 1000mg BID. Current hospitalization is again for decompensated cirrhosis and hepatic encephalopathy in setting of presumed medication non-compliance. Baseline is unknown per primary team, however she was alert and oriented to person and sometimes to place. Per primary team, mentation waxes and wanes. However, for the last three days, they note that she has more frequently been oriented only to person. Additionally has been exhibiting a generalized tremor, which per primary is new. Had an aspiration event on 1/8/24, and antibiotics were escalated from Rocephin (SBP ppx) to Zosyn. Ammonia level has been high this admission (114, 115). She is currently on 1g Keppra BID, Lithium 300mg QHS, Zyprexa 2.5mg QHS,  lactulose and rifaximin, thiamine supplementation, and Zosyn as stated above. Primary team has involved palliative this admission. Neurology consulted due to mental status consistently being worse than at time of admission, along with generalized tremor, questioning if EEG and/or adjustments in AED regimen are needed.    Overview/Hospital Course:  1/10/24: on EEG, no seizures thus far; mentation continues to wax/wane--she is alert and oriented x0 this morning. Remains on 1g Keppra BID.        Subjective:     Interval History: 1/10/24: on EEG, no seizures thus far; mentation continues to wax/wane--she is alert and oriented x0 this morning. Remains on 1g Keppra BID.    Current Neurological Medications: see below.    Current Facility-Administered Medications   Medication Dose Route Frequency Provider Last Rate Last Admin    ALPRAZolam tablet 0.25 mg  0.25 mg Oral BID PRN Robert Mock MD   0.25 mg at 01/06/24 1743    folic acid tablet 1 mg  1 mg Oral Daily Kaylee Chavez MD   1 mg at 01/10/24 0857    furosemide tablet 20 mg  20 mg Oral Daily Kaylee Chavez MD   20 mg at 01/10/24 0857    glucagon (human recombinant) injection 1 mg  1 mg Intramuscular PRN Robert Mock MD        glucose chewable tablet 16 g  16 g Oral PRN Robert Mock MD        glucose chewable tablet 24 g  24 g Oral PRN Robert Mock MD        insulin aspart U-100 pen 0-5 Units  0-5 Units Subcutaneous QID (AC + HS) PRN Robert Mock MD        lactulose 20 gram/30 mL solution Soln 20 g  20 g Oral TID Kaylee Chavez MD   20 g at 01/10/24 0857    levetiracetam 500 mg/5 mL (5 mL) liquid Soln 1,000 mg  1,000 mg Oral BID Robert Mock MD   1,000 mg at 01/10/24 0857    lithium CR tablet 300 mg  300 mg Oral QHS Robert Mock MD   300 mg at 01/09/24 2044    multivitamin tablet  1 tablet Oral Daily Kaylee Chavez MD   1 tablet at 01/10/24 0857    OLANZapine tablet 2.5 mg  2.5 mg Oral QHS Kaylee Chavez MD   2.5 mg at 01/09/24 3767     ondansetron injection 4 mg  4 mg Intravenous Q6H PRN Fernando Anderson MD   4 mg at 01/09/24 0135    pantoprazole suspension 40 mg  40 mg Oral BID Robert Mock MD   40 mg at 01/10/24 0857    piperacillin-tazobactam (ZOSYN) 4.5 g in dextrose 5 % in water (D5W) 100 mL IVPB (MB+)  4.5 g Intravenous Q8H Kaylee Chavez MD   Stopped at 01/10/24 1257    potassium chloride SA CR tablet 40 mEq  40 mEq Oral Q4H Kaylee Chavez MD   40 mEq at 01/10/24 1120    prochlorperazine injection Soln 5 mg  5 mg Intravenous Q6H PRN Fernando Anderson MD        rifAXIMin tablet 550 mg  550 mg Oral BID Robert Mock MD   550 mg at 01/10/24 0857    sodium chloride 0.9% flush 10 mL  10 mL Intravenous PRN Robert Mock MD        thiamine tablet 100 mg  100 mg Oral Daily Robert Mock MD   100 mg at 01/10/24 0857    vitamin D 1000 units tablet 2,000 Units  2,000 Units Oral Daily Robert Mock MD   2,000 Units at 01/10/24 0857       Review of Systems   Unable to perform ROS: Mental status change     Objective:     Vital Signs (Most Recent):  Temp: 99.3 °F (37.4 °C) (01/10/24 1110)  Pulse: 82 (01/10/24 1148)  Resp: 14 (01/10/24 1110)  BP: (!) 120/59 (01/10/24 1110)  SpO2: 95 % (01/10/24 1110) Vital Signs (24h Range):  Temp:  [98.5 °F (36.9 °C)-99.3 °F (37.4 °C)] 99.3 °F (37.4 °C)  Pulse:  [75-96] 82  Resp:  [14-18] 14  SpO2:  [93 %-99 %] 95 %  BP: ()/(52-61) 120/59     Weight: 58.1 kg (128 lb 1.4 oz)  Body mass index is 22.69 kg/m².     Physical Exam   Physical Exam  Vitals and nursing note reviewed.   Constitutional:       Appearance: She is ill-appearing.   HENT:      Head: Normocephalic and atraumatic.   Eyes:      General: Scleral icterus present.   Cardiovascular:      Pulses: Normal pulses.      Heart sounds: Normal heart sounds.   Pulmonary:      Effort: Pulmonary effort is normal.   Skin:     Coloration: Skin is jaundiced.      Neurological Exam:  MENTAL STATUS  Level of consciousness: alert  Orientation:  oriented x0  Attention: poor  Concentration: poor  Speech: normal     No forced gaze deviation. Does not follow commands. No convulsions. No automatisms observed.     CRANIAL NERVES  CN II: blinks to threat  CN III, IV, VI: PERRL  CN VII: facial expression symmetric and full    Unable to follow commands or answer questions for remainder of exam     MOTOR EXAM  Muscle bulk: reduced throughout     Strength: unable to assess today due to inability to follow commands.     REFLEXES    Biceps Brachioradialis Patellar Achilles   Right +3 +3 +2 +2   Left +3 +3 +2 +2      Planter reflex: down-going bilaterally. No clonus.     SENSORY EXAM  Withdraws to pain throughout all 4 extremities.     COORDINATION  Tremor: asterixis, generalized, can see in hands and feet while in repose  Gait: deferred       Significant Labs: All pertinent lab results from the past 24 hours have been reviewed.    Significant Imaging: I have reviewed all pertinent imaging results/findings within the past 24 hours.  Assessment and Plan:     Acute metabolic encephalopathy  58 year-old female with a complex past medical history including alcoholic cirrhosis complicated by acute decompensated liver failure, hepatic cirrhosis, anemia and thrombocytopenia further complicated by GI bleed, retroperitoneal bleed. Additional history includes seizure disorder and bipolar disorder. Multiple prior hospitalizations within the last year for encephalopathy (either hepatic and/or in setting of sepsis or other major medical events). Notably was hospitalized from 5/28/23 to 6/30/23 for hepatic encephalopathy vs non-convulsive status epilepticus: EEGs demonstrated predominantly triphasics/encephalopathy--AED regimen was de-escalated from Zonisamide, Vimpat and Keppra to Keppra 1000mg BID (was discharged on this). Currently hospitalized for acute decompensated cirrhosis and hepatic encephalopathy (also with pleural effusion: likely hepatic hydrothorax) in setting of  presumed non-compliance with home lactulose. Baseline unknown per primary, however was reportedly alert and oriented to person and place on admission. Mentation has waxed/waned but primary says that she has more consistently been oriented only to person for the last three days. Has also been exhibiting generalized tremor, which per primary team is new. Hospital stay has been further complicated by aspiration event on 1/8/23, requiring escalation of antibiotics: Rocephin (SBP ppx) to Zosyn. Neurology consulted due to tremor, encephalopathy, questioning whether EEG or AED adjustment is necessary. On labs, ammonia elevated, Lithium level borderline low, Keppra level in process (ordered after admission). Physical exam is notable for profound jaundice, asterixis (hands/feet). She is oriented to person/place at time of evaluation. Attention is poor, but does follow commands: has more difficulty with multi-step commands. No clonus, Babinski downgoing. No gaze deviation, automatisms, convulsions observed at bedside. With this in mind, doubt that encephalopathy is due to seizure: more likely due to toxic-metabolic +/- infection (recent aspiration event). Further agree that malnutrition/debility also likely contributes (ie has poor physiologic reserve). Likely also has poor neurologic reserve in setting of recurrent episodes of hepatic encephalopathy requiring multiple hospitalizations, making it more difficult for her to tolerate toxic-metabolic, etc. abnormalities. Regardless, in setting of history of seizure disorder, will still need to rule out with EEG.    Exam continues to wax and wane. On EEG as of morning of 1/10/23. No seizure activity thus far per preliminary report.    Recommendations  -on continuous EEG: follow-up formal report  -keep Keppra at 1000mg BID  ->follow up Keppra level  -please continue to treat toxic/metabolic and infectious abnormalities  -neurology will continue to follow  -contact us with  questions/concerns        VTE Risk Mitigation (From admission, onward)           Ordered     Reason for No Pharmacological VTE Prophylaxis  Once        Question:  Reasons:  Answer:  Risk of Bleeding    01/05/24 0317     IP VTE HIGH RISK PATIENT  Once         01/05/24 0317     Place sequential compression device  Until discontinued         01/05/24 0317                    Sarmad Burnette DO  Neurology  [facility] - Transplant Stepdown

## 2024-01-10 NOTE — PROGRESS NOTES
"[facility] PROGRESS NOTE    Patient Name: Marely Hamilton  MRN: [id number]  Patient Class: IP- Inpatient  Admission Date: 1/4/2024  Attending Physician: Kaylee Chavez MD      SUBJECTIVE:   Marely Hamilton is a 58 y.o. female with past psychiatric history of bipolar disorder, alcohol use disorder & past pertinent medical history of alcoholic cirrhosis, chronic anemia, thrombocytopenia, cholelithiasis, DVT with IVC filter in place, prior GI bleeds, ESBL UTI history, seizure disorder presents to the ED/admitted to the hospital for Decompensated hepatic cirrhosis     Psychiatry consulted for "extensive psych history, polypharmacy, paranoia & delusions/hallucinations"     Patient laying in bed during psychiatric examination today, minimally responsive to questions. Reports not sleeping well, but otherwise with limited responses to any questions asked by provider. Does appear to be tracking with eyes, though appears to nod off routinely in the middle of questioning. Interview eventually terminated as no further meaningful information was able to be obtained.        OBJECTIVE:    Mental Status Exam:  General Appearance: dressed in hospital garb, in no acute distress, jaundice, thin and gaunt   Behavior: minimal responses, restless and fidgety  Involuntary Movements and Motor Activity: +tremors  Gait and Station: unable to assess - patient lying down or seated  Speech and Language: responds to questions minimally/briefly, poor effort given during testing, paucity of speech   Mood: "bad"  Affect: blunted  Thought Process and Associations: difficult to assess due to poverty of thought  Thought Content and Perceptions:: Denies SI/HI/AVH  Sensorium and Orientation: oriented to person only  Recent and Remote Memory: Unable to  Formally Assess  Attention and Concentration: inattentive to conversation, easily distractible  Fund of Knowledge: Unable to Formally Assess  Insight: poor awareness of illness  Judgment: " impaired due to impaired insight    CAM ICU positive? yes      ASSESSMENT & RECOMMENDATIONS   Acute Metabolic Encephalopathy   Bipolar 1 Disorder, depressed  Decompensated Hepatic Cirrhosis     Unspecified Neurocognitive Disorder      Bipolar 1 Disorder   PSYCH MEDICATIONS  Scheduled              - Continue home Lithium 300 mg nightly              - Continue home Zyprexa 5 mg nightly      Per previous psychiatry consult in December 2023, patient has long history of Bipolar Disorder, effectively treated with Lithium. As such, would be hesitant to adjust Lithium at this time and risk mood disturbance. If concern for polypharmacy, may consider decreasing Zyprexa to 2.5 mg nightly.      DELIRIUM  DELIRIUM BEHAVIOR MANAGEMENT  PLEASE utilize CHEMICAL restraints with PRN meds first for agitation. Minimize use of PHYSICAL restraints OR have periods of being out of physical restraints if possible.  Keep window shades open and room lit during day and room dim at night in order to promote normal sleep-wake cycles  Encourage family at bedside. [place] patient often to situation, location, date.  Continue to Limit or Discontinue use of Narcotics, Benzos and Anti-cholinergic medications as they may worsen delirium.  Continue medical workup for causative etiology of Delirium.         RISK ASSESSMENT  NO NEED FOR PEC patient NOT in any imminent danger of hurting self or others and not gravely disabled.      FOLLOW UP  Will sign off.      DISPOSITION - once medically cleared:    Defer to medical team    Please contact ON CALL psychiatry service (24/7) for any acute issues that may arise.      Cameron Torrez MD  [facility], PGY-II      --------------------------------------------------------------------------------------------------------------------------------------------------------------------------------------------------------------------------------------    CONTINUED OBJECTIVE clinical data & findings reviewed  and noted for above decision-making    Current Medications:   Scheduled Meds:    folic acid  1 mg Oral Daily    furosemide  20 mg Oral Daily    lactulose  20 g Oral TID    levetiracetam  1,000 mg Oral BID    lithium  300 mg Oral QHS    multivitamin  1 tablet Oral Daily    OLANZapine  2.5 mg Oral QHS    pantoprazole  40 mg Oral BID    piperacillin-tazobactam (Zosyn) IV (PEDS and ADULTS) (extended infusion is not appropriate)  4.5 g Intravenous Q8H    rifAXIMin  550 mg Oral BID    thiamine  100 mg Oral Daily    vitamin D  2,000 Units Oral Daily     PRN Meds: ALPRAZolam, glucagon (human recombinant), glucose, glucose, insulin aspart U-100, ondansetron, prochlorperazine, sodium chloride 0.9%    Allergies:   Review of patient's allergies indicates:   Allergen Reactions    Sulfa (sulfonamide antibiotics) Rash    Codeine Nausea And Vomiting       Vitals  Vitals:    01/10/24 1516   BP: 127/64   Pulse: 78   Resp: 14   Temp: 98.9 °F (37.2 °C)       Labs/Imaging/Studies:  Recent Results (from the past 24 hour(s))   Comprehensive Metabolic Panel (CMP)    Collection Time: 01/10/24  5:57 AM   Result Value Ref Range    Sodium 147 (H) 136 - 145 mmol/L    Potassium 2.9 (L) 3.5 - 5.1 mmol/L    Chloride 119 (H) 95 - 110 mmol/L    CO2 21 (L) 23 - 29 mmol/L    Glucose 144 (H) 70 - 110 mg/dL    BUN 11 6 - 20 mg/dL    Creatinine 0.5 0.5 - 1.4 mg/dL    Calcium 8.8 8.7 - 10.5 mg/dL    Total Protein 4.6 (L) 6.0 - 8.4 g/dL    Albumin 1.8 (L) 3.5 - 5.2 g/dL    Total Bilirubin 18.1 (H) 0.1 - 1.0 mg/dL    Alkaline Phosphatase 136 (H) 55 - 135 U/L    AST 47 (H) 10 - 40 U/L    ALT 25 10 - 44 U/L    eGFR >60.0 >60 mL/min/1.73 m^2    Anion Gap 7 (L) 8 - 16 mmol/L   CBC with Automated Differential    Collection Time: 01/10/24  5:57 AM   Result Value Ref Range    WBC 4.50 3.90 - 12.70 K/uL    RBC 2.08 (L) 4.00 - 5.40 M/uL    Hemoglobin 8.0 (L) 12.0 - 16.0 g/dL    Hematocrit 25.9 (L) 37.0 - 48.5 %     (H) 82 - 98 fL    MCH 38.5 (H) 27.0 - 31.0  pg    MCHC 30.9 (L) 32.0 - 36.0 g/dL    RDW 15.9 (H) 11.5 - 14.5 %    Platelets 47 (L) 150 - 450 K/uL    MPV 9.0 (L) 9.2 - 12.9 fL    Immature Granulocytes 0.4 0.0 - 0.5 %    Gran # (ANC) 3.7 1.8 - 7.7 K/uL    Immature Grans (Abs) 0.02 0.00 - 0.04 K/uL    Lymph # 0.4 (L) 1.0 - 4.8 K/uL    Mono # 0.4 0.3 - 1.0 K/uL    Eos # 0.0 0.0 - 0.5 K/uL    Baso # 0.00 0.00 - 0.20 K/uL    nRBC 0 0 /100 WBC    Gran % 81.9 (H) 38.0 - 73.0 %    Lymph % 8.4 (L) 18.0 - 48.0 %    Mono % 8.4 4.0 - 15.0 %    Eosinophil % 0.9 0.0 - 8.0 %    Basophil % 0.0 0.0 - 1.9 %    Platelet Estimate Decreased (A)     Aniso Slight     Poik Slight     Poly Occasional     Ovalocytes Occasional     Tear Drop Cells Occasional     Differential Method Automated    PT/INR    Collection Time: 01/10/24  5:57 AM   Result Value Ref Range    Prothrombin Time 24.0 (H) 9.0 - 12.5 sec    INR 2.4 (H) 0.8 - 1.2   Magnesium    Collection Time: 01/10/24  5:57 AM   Result Value Ref Range    Magnesium 1.9 1.6 - 2.6 mg/dL   Phosphorus    Collection Time: 01/10/24  5:57 AM   Result Value Ref Range    Phosphorus 2.2 (L) 2.7 - 4.5 mg/dL     Imaging Results              X-Ray Chest AP Portable (Final result)  Result time 01/05/24 11:28:08      Final result by Americo Reyes MD (01/05/24 11:28:08)                   Impression:      As above      Electronically signed by: Americo Reyes MD  Date:    01/05/2024  Time:    11:28               Narrative:    EXAMINATION:  XR CHEST AP PORTABLE    CLINICAL HISTORY:  s/p Right thoracentesis. Rule out pneumothorax.;    TECHNIQUE:  One view    COMPARISON:  Comparison is made to 01/05/2024 at 01:07.    FINDINGS:  Opacity in the right hemithorax present on the prior examination is no longer observed, relating to essentially complete interval removal of pleural fluid from the right hemithorax following thoracentesis.  No post procedure pneumothorax.  No significant detrimental interval change since 01/05/2024 13:07 is appreciated.                                        IR Thoracentesis with Imaging (Final result)  Result time 01/05/24 12:45:31      Final result by Antolin Moses MD (01/05/24 12:45:31)                   Impression:      Successful ultrasound-guided right thoracentesis with drainage of 0.65 liters of serous fluid.    Plan:    Resume care by clinical team.    _______________________________________________________________      Electronically signed by: Antolin Moses MD  Date:    01/05/2024  Time:    12:45               Narrative:    EXAMINATION:  Ultrasound-guided thoracentesis    Procedural Personnel    Attending physician(s): Josué    Fellow physician(s): None    Resident physician(s): None    Advanced practice provider(s): None    Pre-procedure diagnosis: Right pleural effusion    Post-procedure diagnosis: Same    Indication: Shortness of breath    Complications: No immediate complications.    TECHNIQUE:  - Limited thoracic ultrasound    - Ultrasound-guided thoracentesis    FINDINGS:  Pre-procedure    Consent: Informed consent for the procedure was obtained and time-out was performed prior to the procedure.    Preparation: The site was prepared and draped using maximal sterile barrier technique including cutaneous antisepsis.    Anesthesia/sedation    Level of anesthesia/sedation: No sedation    Limited thoracic ultrasound    Limited thoracic ultrasound was performed using a curved transducer. A safe window for thoracentesis was identified.    Right hemithorax findings: Small pleural effusion    Thoracentesis    Local anesthesia was administered. The pleural space was accessed and fluid return confirmed position. The fluid was drained.    Real-time ultrasound guidance used: Yes    Catheter placed: 6F [id number]    Closure    The catheter was removed. A sterile bandage was applied.    Post-drainage hemithorax findings: Not performed    Additional Details    Additional description of procedure: None    Equipment details:  "None    Specimens removed: Pleural fluid    Estimated blood loss (mL): Less than 10    Standardized report: [email or web address]_[email or web address]    Attestation    Signer name: Josué    I attest that I was present for the entire procedure. I reviewed the stored images and agree with the report as written.                                       IR US Abdomen Limited (Final result)  Result time 01/05/24 12:47:45   Procedure changed from IR Paracentesis with Imaging     Final result by Antolin Moses MD (01/05/24 12:47:45)                   Impression:      No significant volume ascites amenable to therapeutic paracentesis.      Electronically signed by: Antolin Moses MD  Date:    01/05/2024  Time:    12:47               Narrative:    EXAMINATION:  IR US ABDOMEN LIMITED    CLINICAL HISTORY:  decompensated cirrhosis;.    TECHNIQUE:  Limited abdominal ultrasound    COMPARISON:  None    FINDINGS:  No significant volume of ascites that would be amenable to therapeutic paracentesis.                                       X-Ray Chest AP Portable (Final result)  Result time 01/05/24 01:28:46      Final result by Marvin Urban MD (01/05/24 01:28:46)                   Impression:      Moderate to large right pleural effusion with passive atelectasis or airspace disease in the right mid and lower lung zone.    Mild enlargement of the cardiomediastinal silhouette.      Electronically signed by: Marvin Urban MD  Date:    01/05/2024  Time:    01:28               Narrative:    EXAMINATION:  XR CHEST AP PORTABLE    CLINICAL HISTORY:  Provided history is "  Hypoxemia".    TECHNIQUE:  One view of the chest.    COMPARISON:  11/29/2023.    FINDINGS:  Cardiac wires overlie the chest.  Exam quality is limited by suboptimal positioning.  Cardiomediastinal silhouette is magnified by portable technique and is likely mildly enlarged.  There is a moderate to large right pleural effusion with passive atelectasis or airspace disease in the right " mid and lower lung zone.  Left lung is grossly clear.  No large left pleural effusion.  No distinct pneumothorax.

## 2024-01-10 NOTE — PROCEDURES
PRELIM EEG REPORT    First 45 minutes of EEG reviewed. Background shows 9 Hz posterior dominant alpha rhythm. No potentially epileptiform, seizure activity or triphasics seen during this portion of the recording. Brief sleep was captured with normal sleep architecture.     Will remain on EEG monitoring. Full EEG report to follow.

## 2024-01-10 NOTE — PLAN OF CARE
Pt has call bell in reach, non slip socks on, and bedrails up x2.  Pt encouraged to wash hands.  She is on iv antibiotics, zosyn.  I am checking her neuro function q4 hrs.  The bed alarm is activated.  Pt is incontinent. I am checking her throughout the shift.  Pt meds are being crushed and put in pudding. The liquid meds are given through the straw.   She is on tele monitoring.  She is a total assist ambulating.  Barrier cream being applied to buttocks.

## 2024-01-10 NOTE — PT/OT/SLP PROGRESS
Speech Language Pathology Treatment    Patient Name:  Marely Hamilton   MRN:  [id number]  Admitting Diagnosis: Decompensated hepatic cirrhosis    Recommendations:                 General Recommendations:  Dysphagia therapy  Diet recommendations:  Puree Diet - IDDSI Level 4, Liquid Diet Level: Thin liquids - IDDSI Level 0   Aspiration Precautions: 1 bite/sip at a time, Alternating bites/sips, Assistance with meals, Feed only when awake/alert, HOB to 90 degrees, Meds crushed in puree, Monitor for s/s of aspiration, Small bites/sips, stop feeding if the pt has a change in status, and Strict aspiration precautions   General Precautions: Standard, pureed diet  Communication strategies:  provide increased time to answer and go to room if call light pushed    Assessment:     Marely Hamilton is a 58 y.o. female with an SLP diagnosis of Dysphagia. SLP cautiously downgrading the pt's diet to pureed/thin 2/2 change in respiratory status during PO trials.     Subjective     RN cleared pt for ongoing dysphagia therapy. RN reports pt tolerated her meds crushed in puree this morning.     Pt awake/alert.    Pain/Comfort:  Pain Rating 1: 0/10  Pain Rating Post-Intervention 1: 0/10    Respiratory Status: Nasal cannula, flow 2 L/min    Objective:     Has the patient been evaluated by SLP for swallowing?   Yes  Keep patient NPO? No        Pt seen for ongoing dysphagia therapy. Pt seen with EEG in place. She was awake/alert and agreeable to PO trials of minced/moist consistencies and thin liquids from her lunch tray. PCT entered the room and reported that the pt ate ~50% of her breakfast tray this morning. HOB raised for safety precautions. Pt drank ~5 oz of water via straw sips and ate x7 bites of minced/moist chicken, mashed potatoes, and broccoli. Pt with slow bolus formation and a-p transit time. Mild oral residue noted though cleared with liquid wash. Multiple swallows noted following PO trials of minced/moist consistencies.  Following last PO trial of minced/moist solids, pt seen with a change in status c/b o2 sats dropping to the low 40's. PO trials discontinued and RN notified of change in respiratory status. Charge nurse entered the room. Following a few minutes, pt's o2 sats increased to 90-91%. SLP provided education to the pt regarding s/s of aspiration, recs for diet downgrade due to status change, aspiration precautions, and POC. Pt in agreement. Medical team updated regarding diet downgrade upon exiting the room. SLP will continue to follow to ensure diet tolerance as well as diet advancement when appropriate.     Goals:   Multidisciplinary Problems       SLP Goals          Problem: SLP    Goal Priority Disciplines Outcome   SLP Goal     SLP Ongoing, Progressing   Description: Speech Language Pathology Goals  Goals expected to be met by 1/23    1. Pt will tolerate least restrictive PO diet without any overt s/sx of airway compromise.                                Plan:     Patient to be seen:  3 x/week   Plan of Care expires:  02/08/24  Plan of Care reviewed with:  patient   SLP Follow-Up:  Yes       Discharge recommendations:  Low Intensity Therapy   Barriers to Discharge:  Level of Skilled Assistance Needed      Time Tracking:     SLP Treatment Date:   01/10/24  Speech Start Time:  1327  Speech Stop Time:  1347     Speech Total Time (min):  20 min    Billable Minutes: Treatment Swallowing Dysfunction 10 and Self Care/Home Management Training 10    01/10/2024

## 2024-01-10 NOTE — PLAN OF CARE
·Patient orientation only oriented to self.   ·Speech and occupational therapy consulted and evaluated patient.   ·Follow commands appropriately.   ·Palliative care is following.   ·Hepatology following.   ·PO lactulose continued per order.   ·Patient is incontinent of urine and bowels.   ·Sacral redness, cream applied, wound care following.   ·Q2h turns  ·Patient eating meals with assistance.   ·Bed is in lowest position with wheels locked. Call light remains within reach.

## 2024-01-11 ENCOUNTER — DOCUMENTATION ONLY (OUTPATIENT)
Dept: NEUROLOGY | Facility: HOSPITAL | Age: 59
End: 2024-01-11
Payer: MEDICARE

## 2024-01-11 LAB
ALBUMIN SERPL BCP-MCNC: 1.9 G/DL (ref 3.5–5.2)
ALP SERPL-CCNC: 130 U/L (ref 55–135)
ALT SERPL W/O P-5'-P-CCNC: 23 U/L (ref 10–44)
ANION GAP SERPL CALC-SCNC: 5 MMOL/L (ref 8–16)
AST SERPL-CCNC: 45 U/L (ref 10–40)
BASOPHILS # BLD AUTO: 0 K/UL (ref 0–0.2)
BASOPHILS NFR BLD: 0 % (ref 0–1.9)
BILIRUB SERPL-MCNC: 17.2 MG/DL (ref 0.1–1)
BUN SERPL-MCNC: 10 MG/DL (ref 6–20)
CALCIUM SERPL-MCNC: 8.5 MG/DL (ref 8.7–10.5)
CHLORIDE SERPL-SCNC: 122 MMOL/L (ref 95–110)
CHLORIDE UR-SCNC: <20 MMOL/L (ref 25–200)
CO2 SERPL-SCNC: 22 MMOL/L (ref 23–29)
CREAT SERPL-MCNC: 0.4 MG/DL (ref 0.5–1.4)
CREAT UR-MCNC: 36 MG/DL (ref 15–325)
DIFFERENTIAL METHOD BLD: ABNORMAL
EOSINOPHIL # BLD AUTO: 0.2 K/UL (ref 0–0.5)
EOSINOPHIL NFR BLD: 5.9 % (ref 0–8)
ERYTHROCYTE [DISTWIDTH] IN BLOOD BY AUTOMATED COUNT: 15.9 % (ref 11.5–14.5)
EST. GFR  (NO RACE VARIABLE): >60 ML/MIN/1.73 M^2
GLUCOSE SERPL-MCNC: 114 MG/DL (ref 70–110)
HCT VFR BLD AUTO: 24.4 % (ref 37–48.5)
HGB BLD-MCNC: 7.8 G/DL (ref 12–16)
IMM GRANULOCYTES # BLD AUTO: 0.01 K/UL (ref 0–0.04)
IMM GRANULOCYTES NFR BLD AUTO: 0.3 % (ref 0–0.5)
INR PPP: 2 (ref 0.8–1.2)
LEVETIRACETAM SERPL-MCNC: 48.6 UG/ML (ref 3–60)
LYMPHOCYTES # BLD AUTO: 0.3 K/UL (ref 1–4.8)
LYMPHOCYTES NFR BLD: 7.8 % (ref 18–48)
MAGNESIUM SERPL-MCNC: 1.8 MG/DL (ref 1.6–2.6)
MCH RBC QN AUTO: 38 PG (ref 27–31)
MCHC RBC AUTO-ENTMCNC: 32 G/DL (ref 32–36)
MCV RBC AUTO: 119 FL (ref 82–98)
MONOCYTES # BLD AUTO: 0.3 K/UL (ref 0.3–1)
MONOCYTES NFR BLD: 7.5 % (ref 4–15)
NEUTROPHILS # BLD AUTO: 2.9 K/UL (ref 1.8–7.7)
NEUTROPHILS NFR BLD: 78.5 % (ref 38–73)
NRBC BLD-RTO: 0 /100 WBC
OSMOLALITY UR: 333 MOSM/KG (ref 50–1200)
PHOSPHATE SERPL-MCNC: 1.8 MG/DL (ref 2.7–4.5)
PLATELET # BLD AUTO: 43 K/UL (ref 150–450)
PMV BLD AUTO: 10 FL (ref 9.2–12.9)
POTASSIUM SERPL-SCNC: 3.6 MMOL/L (ref 3.5–5.1)
PROT SERPL-MCNC: 4.4 G/DL (ref 6–8.4)
PROTHROMBIN TIME: 20.7 SEC (ref 9–12.5)
RBC # BLD AUTO: 2.05 M/UL (ref 4–5.4)
SODIUM SERPL-SCNC: 149 MMOL/L (ref 136–145)
SODIUM UR-SCNC: <10 MMOL/L (ref 20–250)
UUN UR-MCNC: 444 MG/DL (ref 140–1050)
WBC # BLD AUTO: 3.74 K/UL (ref 3.9–12.7)

## 2024-01-11 PROCEDURE — 84300 ASSAY OF URINE SODIUM: CPT | Performed by: STUDENT IN AN ORGANIZED HEALTH CARE EDUCATION/TRAINING PROGRAM

## 2024-01-11 PROCEDURE — 80053 COMPREHEN METABOLIC PANEL: CPT | Performed by: STUDENT IN AN ORGANIZED HEALTH CARE EDUCATION/TRAINING PROGRAM

## 2024-01-11 PROCEDURE — 25000003 PHARM REV CODE 250: Performed by: STUDENT IN AN ORGANIZED HEALTH CARE EDUCATION/TRAINING PROGRAM

## 2024-01-11 PROCEDURE — C9113 INJ PANTOPRAZOLE SODIUM, VIA: HCPCS | Performed by: HOSPITALIST

## 2024-01-11 PROCEDURE — 84540 ASSAY OF URINE/UREA-N: CPT | Performed by: STUDENT IN AN ORGANIZED HEALTH CARE EDUCATION/TRAINING PROGRAM

## 2024-01-11 PROCEDURE — 20600001 HC STEP DOWN PRIVATE ROOM

## 2024-01-11 PROCEDURE — 82436 ASSAY OF URINE CHLORIDE: CPT | Performed by: STUDENT IN AN ORGANIZED HEALTH CARE EDUCATION/TRAINING PROGRAM

## 2024-01-11 PROCEDURE — 83935 ASSAY OF URINE OSMOLALITY: CPT | Performed by: STUDENT IN AN ORGANIZED HEALTH CARE EDUCATION/TRAINING PROGRAM

## 2024-01-11 PROCEDURE — 63600175 PHARM REV CODE 636 W HCPCS: Performed by: HOSPITALIST

## 2024-01-11 PROCEDURE — 82570 ASSAY OF URINE CREATININE: CPT | Performed by: STUDENT IN AN ORGANIZED HEALTH CARE EDUCATION/TRAINING PROGRAM

## 2024-01-11 PROCEDURE — 36415 COLL VENOUS BLD VENIPUNCTURE: CPT | Performed by: STUDENT IN AN ORGANIZED HEALTH CARE EDUCATION/TRAINING PROGRAM

## 2024-01-11 PROCEDURE — 63600175 PHARM REV CODE 636 W HCPCS: Performed by: STUDENT IN AN ORGANIZED HEALTH CARE EDUCATION/TRAINING PROGRAM

## 2024-01-11 PROCEDURE — 84100 ASSAY OF PHOSPHORUS: CPT | Performed by: STUDENT IN AN ORGANIZED HEALTH CARE EDUCATION/TRAINING PROGRAM

## 2024-01-11 PROCEDURE — 85025 COMPLETE CBC W/AUTO DIFF WBC: CPT | Performed by: STUDENT IN AN ORGANIZED HEALTH CARE EDUCATION/TRAINING PROGRAM

## 2024-01-11 PROCEDURE — 83735 ASSAY OF MAGNESIUM: CPT | Performed by: STUDENT IN AN ORGANIZED HEALTH CARE EDUCATION/TRAINING PROGRAM

## 2024-01-11 PROCEDURE — 92526 ORAL FUNCTION THERAPY: CPT

## 2024-01-11 PROCEDURE — 25000003 PHARM REV CODE 250: Performed by: HOSPITALIST

## 2024-01-11 PROCEDURE — 85610 PROTHROMBIN TIME: CPT | Performed by: STUDENT IN AN ORGANIZED HEALTH CARE EDUCATION/TRAINING PROGRAM

## 2024-01-11 RX ORDER — OLANZAPINE 5 MG/1
5 TABLET, ORALLY DISINTEGRATING ORAL NIGHTLY
Status: DISCONTINUED | OUTPATIENT
Start: 2024-01-11 | End: 2024-01-18 | Stop reason: HOSPADM

## 2024-01-11 RX ORDER — PANTOPRAZOLE SODIUM 40 MG/10ML
40 INJECTION, POWDER, LYOPHILIZED, FOR SOLUTION INTRAVENOUS DAILY
Status: DISCONTINUED | OUTPATIENT
Start: 2024-01-11 | End: 2024-01-18 | Stop reason: HOSPADM

## 2024-01-11 RX ORDER — LEVETIRACETAM 500 MG/5ML
1000 INJECTION, SOLUTION, CONCENTRATE INTRAVENOUS EVERY 12 HOURS
Status: DISCONTINUED | OUTPATIENT
Start: 2024-01-11 | End: 2024-01-17

## 2024-01-11 RX ORDER — AMOXICILLIN AND CLAVULANATE POTASSIUM 400; 57 MG/5ML; MG/5ML
400 POWDER, FOR SUSPENSION ORAL EVERY 12 HOURS
Status: DISCONTINUED | OUTPATIENT
Start: 2024-01-11 | End: 2024-01-12

## 2024-01-11 RX ADMIN — POTASSIUM CHLORIDE 10 MEQ: 7.46 INJECTION, SOLUTION INTRAVENOUS at 01:01

## 2024-01-11 RX ADMIN — OLANZAPINE 5 MG: 5 TABLET, ORALLY DISINTEGRATING ORAL at 08:01

## 2024-01-11 RX ADMIN — PIPERACILLIN SODIUM AND TAZOBACTAM SODIUM 4.5 G: 4; .5 INJECTION, POWDER, FOR SOLUTION INTRAVENOUS at 09:01

## 2024-01-11 RX ADMIN — THERA TABS 1 TABLET: TAB at 09:01

## 2024-01-11 RX ADMIN — LEVETIRACETAM 1000 MG: 100 SOLUTION ORAL at 09:01

## 2024-01-11 RX ADMIN — FOLIC ACID 1 MG: 5 INJECTION, SOLUTION INTRAMUSCULAR; INTRAVENOUS; SUBCUTANEOUS at 02:01

## 2024-01-11 RX ADMIN — PANTOPRAZOLE SODIUM 40 MG: 40 GRANULE, DELAYED RELEASE ORAL at 09:01

## 2024-01-11 RX ADMIN — Medication 100 MG: at 09:01

## 2024-01-11 RX ADMIN — PIPERACILLIN SODIUM AND TAZOBACTAM SODIUM 4.5 G: 4; .5 INJECTION, POWDER, FOR SOLUTION INTRAVENOUS at 02:01

## 2024-01-11 RX ADMIN — RIFAXIMIN 550 MG: 550 TABLET ORAL at 09:01

## 2024-01-11 RX ADMIN — POTASSIUM CHLORIDE 10 MEQ: 7.46 INJECTION, SOLUTION INTRAVENOUS at 12:01

## 2024-01-11 RX ADMIN — CHOLECALCIFEROL TAB 25 MCG (1000 UNIT) 2000 UNITS: 25 TAB at 09:01

## 2024-01-11 RX ADMIN — THIAMINE HYDROCHLORIDE 100 MG: 100 INJECTION, SOLUTION INTRAMUSCULAR; INTRAVENOUS at 01:01

## 2024-01-11 RX ADMIN — LACTULOSE 20 G: 20 SOLUTION ORAL at 08:01

## 2024-01-11 RX ADMIN — AMOXICILLIN AND CLAVULANATE POTASSIUM 400 MG: 400; 57 POWDER, FOR SUSPENSION ORAL at 08:01

## 2024-01-11 RX ADMIN — LACTULOSE 20 G: 20 SOLUTION ORAL at 09:01

## 2024-01-11 RX ADMIN — LITHIUM CARBONATE 300 MG: 300 TABLET, FILM COATED, EXTENDED RELEASE ORAL at 08:01

## 2024-01-11 RX ADMIN — PANTOPRAZOLE SODIUM 40 MG: 40 INJECTION, POWDER, FOR SOLUTION INTRAVENOUS at 03:01

## 2024-01-11 RX ADMIN — FOLIC ACID 1 MG: 1 TABLET ORAL at 09:01

## 2024-01-11 RX ADMIN — LEVETIRACETAM 1000 MG: 100 INJECTION INTRAVENOUS at 08:01

## 2024-01-11 RX ADMIN — LACTULOSE 20 G: 20 SOLUTION ORAL at 03:01

## 2024-01-11 RX ADMIN — RIFAXIMIN 550 MG: 550 TABLET ORAL at 08:01

## 2024-01-11 NOTE — SUBJECTIVE & OBJECTIVE
Subjective:     Interval History: see hospital course.    Current Neurological Medications: see below.    Current Facility-Administered Medications   Medication Dose Route Frequency Provider Last Rate Last Admin    ALPRAZolam tablet 0.25 mg  0.25 mg Oral BID PRN Robert Mock MD   0.25 mg at 01/06/24 1743    amoxicillin-clavulanate 400-57 mg/5 mL suspension 400 mg  400 mg Oral Q12H Lenka Ortiz MD        folic acid 1 mg in dextrose 5 % (D5W) 100 mL IVPB  1 mg Intravenous Daily Lenka Ortiz MD        glucagon (human recombinant) injection 1 mg  1 mg Intramuscular PRN Robert Mock MD        glucose chewable tablet 16 g  16 g Oral PRN Robert Mock MD        glucose chewable tablet 24 g  24 g Oral PRN Robert Mock MD        insulin aspart U-100 pen 0-5 Units  0-5 Units Subcutaneous QID (AC + HS) PRN Robert Mock MD        lactulose 20 gram/30 mL solution Soln 20 g  20 g Oral TID Kaylee Chavez MD   20 g at 01/11/24 0956    levETIRAcetam injection 1,000 mg  1,000 mg Intravenous Q12H Lenka Ortiz MD        lithium CR tablet 300 mg  300 mg Oral QHS Robert Mock MD   300 mg at 01/10/24 2045    OLANZapine zydis disintegrating tablet 5 mg  5 mg Oral QHS Lenka Ortiz MD        ondansetron injection 4 mg  4 mg Intravenous Q6H PRN Fernando Anderson MD   4 mg at 01/09/24 0135    pantoprazole injection 40 mg  40 mg Intravenous Daily Lenka Ortiz MD        prochlorperazine injection Soln 5 mg  5 mg Intravenous Q6H PRN Fernando Anderson MD        rifAXIMin tablet 550 mg  550 mg Oral BID Robert Mock MD   550 mg at 01/11/24 0955    sodium chloride 0.9% flush 10 mL  10 mL Intravenous PRN Robert Mock MD        thiamine (B-1) 100 mg in dextrose 5 % (D5W) 100 mL IVPB  100 mg Intravenous Daily Lenka Ortiz MD           Review of Systems   Unable to perform ROS: Mental status change     Objective:     Vital Signs (Most Recent):  Temp: 98.4 °F (36.9 °C)  (01/11/24 1146)  Pulse: 67 (01/11/24 1146)  Resp: 14 (01/11/24 1146)  BP: (!) 119/58 (01/11/24 1146)  SpO2: 100 % (01/11/24 1146) Vital Signs (24h Range):  Temp:  [97.2 °F (36.2 °C)-98.9 °F (37.2 °C)] 98.4 °F (36.9 °C)  Pulse:  [64-80] 67  Resp:  [14-18] 14  SpO2:  [96 %-100 %] 100 %  BP: (104-129)/(51-72) 119/58     Weight: 58.1 kg (128 lb 1.4 oz)  Body mass index is 22.69 kg/m².     Physical Exam   Physical Exam  Vitals and nursing note reviewed.   Constitutional:       Appearance: She is ill-appearing.   HENT:      Head: Normocephalic and atraumatic.   Eyes:      General: Scleral icterus present.   Cardiovascular:      Pulses: Normal pulses.      Heart sounds: Normal heart sounds.   Pulmonary:      Effort: Pulmonary effort is normal.   Skin:     Coloration: Skin is jaundiced.      Neurological Exam:  MENTAL STATUS  Level of consciousness: alert  Orientation: oriented x0  Attention: poor  Concentration: poor  Speech: normal     Follows commands. No forced gaze deviation. No convulsions. Displays intermittent abnormal brijesh-oral movements.     CRANIAL NERVES  CN II: blinks to threat  CN III, IV, VI: PERRL  CN VII: facial expression symmetric and full       MOTOR EXAM  Muscle bulk: reduced throughout     Strength: 4-/5 bilateral upper extremities, 2 to 3-/5 bilateral lower extremities     REFLEXES    Biceps Brachioradialis Patellar Achilles   Right +3 +3 +2 +2   Left +3 +3 +2 +2      Planter reflex: down-going bilaterally. No clonus.     SENSORY EXAM  Withdraws to pain throughout all 4 extremities.     COORDINATION  Tremor: asterixis, generalized, can see in hands and feet while in repose  Gait: deferred       Significant Labs: All pertinent lab results from the past 24 hours have been reviewed.    Significant Imaging: I have reviewed all pertinent imaging results/findings within the past 24 hours.

## 2024-01-11 NOTE — ASSESSMENT & PLAN NOTE
Patient has hypernatremia which is controlled. The hypernatremia is due to  Poor PO intake and Lasix . We will aim to correct the sodium by 8-10mEq in 24 hours. We will correct their hypernatremia with Select IV fluids: 5% albumin 12.5gms . The patient's sodium results have been reviewed and are listed below.  Recent Labs   Lab 01/10/24  0557   *     Will check Urine lytes

## 2024-01-11 NOTE — SUBJECTIVE & OBJECTIVE
Interval History:   1/11: Patient extremely weak. Will try and avoid oral medications. EEG without seizure. DNR    Review of Systems   Unable to perform ROS: Mental status change   Constitutional:  Positive for appetite change. Negative for activity change and chills.   HENT:          Desquamation of lips   Respiratory:          Nasal canula in place   Gastrointestinal:         Incontinence of stools   Genitourinary:         Urinary incontinence   Skin:  Positive for color change.   Neurological:  Positive for tremors. Negative for speech difficulty.   Psychiatric/Behavioral:  Positive for confusion.      Objective:     Vital Signs (Most Recent):  Temp: 98.4 °F (36.9 °C) (01/11/24 1146)  Pulse: 67 (01/11/24 1146)  Resp: 14 (01/11/24 1146)  BP: (!) 119/58 (01/11/24 1146)  SpO2: 100 % (01/11/24 1146) Vital Signs (24h Range):  Temp:  [97.2 °F (36.2 °C)-98.9 °F (37.2 °C)] 98.4 °F (36.9 °C)  Pulse:  [64-80] 67  Resp:  [14-18] 14  SpO2:  [96 %-100 %] 100 %  BP: (104-129)/(51-72) 119/58     Weight: 58.1 kg (128 lb 1.4 oz)  Body mass index is 22.69 kg/m².    Intake/Output Summary (Last 24 hours) at 1/11/2024 1346  Last data filed at 1/11/2024 0600  Gross per 24 hour   Intake 1185.75 ml   Output 40 ml   Net 1145.75 ml         Physical Exam  Vitals reviewed.   Constitutional:       General: She is not in acute distress.     Appearance: She is cachectic. She is ill-appearing.   HENT:      Head: Normocephalic and atraumatic.      Mouth/Throat:      Mouth: Mucous membranes are dry.      Pharynx: No oropharyngeal exudate.      Comments: Desquamation of lips  Eyes:      General: Scleral icterus present.      Extraocular Movements: Extraocular movements intact.   Cardiovascular:      Rate and Rhythm: Normal rate and regular rhythm.      Heart sounds: Normal heart sounds. No murmur heard.  Pulmonary:      Effort: Pulmonary effort is normal. No respiratory distress.      Breath sounds: No wheezing.   Abdominal:      General: Bowel  sounds are normal. There is no distension.      Palpations: Abdomen is soft.      Tenderness: There is no abdominal tenderness. There is no guarding.   Musculoskeletal:         General: No tenderness. Normal range of motion.      Cervical back: Normal range of motion and neck supple.   Lymphadenopathy:      Cervical: No cervical adenopathy.   Skin:     General: Skin is warm and dry.      Capillary Refill: Capillary refill takes less than 2 seconds.      Coloration: Skin is jaundiced.      Findings: No rash.   Neurological:      Mental Status: She is alert. She is disoriented.      Motor: Weakness: bilateral lower extremities.      Comments: Poor participation   Psychiatric:         Attention and Perception: She perceives visual hallucinations.         Behavior: Behavior is cooperative.         Thought Content: Thought content is delusional.             Significant Labs: All pertinent labs within the past 24 hours have been reviewed.    Significant Imaging: I have reviewed all pertinent imaging results/findings within the past 24 hours.

## 2024-01-11 NOTE — ASSESSMENT & PLAN NOTE
Patient with known Cirrhosis with Child's class C. Co-morbidities are present and inclusive of ascites, malnutrition, anemia/pancytopenia, and Pleural effusion .  MELD-Na score calculated; MELD 3.0: 32 at 1/10/2024  5:57 AM  MELD-Na: 27 at 1/10/2024  5:57 AM  Calculated from:  Serum Creatinine: 0.5 mg/dL (Using min of 1 mg/dL) at 1/10/2024  5:57 AM  Serum Sodium: 147 mmol/L (Using max of 137 mmol/L) at 1/10/2024  5:57 AM  Total Bilirubin: 18.1 mg/dL at 1/10/2024  5:57 AM  Serum Albumin: 1.8 g/dL at 1/10/2024  5:57 AM  INR(ratio): 2.4 at 1/10/2024  5:57 AM  Age at listing (hypothetical): 58 years  Sex: Female at 1/10/2024  5:57 AM      Continue chronic meds. Etiology likely ETOH. Will avoid any hepatotoxic meds, and monitor CBC/CMP/INR for synthetic function.     Presenting with decompensation (increased ascites, Bili 12, ammonia 114).  Status post thoracentesis of 650 mL.  Awaiting cultures.  Could not find a pocket for paracentesis    -restart lactulose as she has been noncompliant  Appreciate hepatology recommendations.      Recommendations to include physical therapy occupational therapy, palliative care discussions for goals of care.  She is not a candidate for transplantation given frailty, poor social support and noncompliance with medication

## 2024-01-11 NOTE — PROGRESS NOTES
[facility]  Neurology  Progress Note    Patient Name: Marely Hamilton  MRN: [id number]  Admission Date: 1/4/2024  Hospital Length of Stay: 6 days  Code Status: DNR   Attending Provider: Lenka Ortiz MD  Primary Care Physician: Viktor Ross MD   Principal Problem:Decompensated hepatic cirrhosis    HPI:   This is a 58 year-old female with a complex past medical history including alcoholic cirrhosis (complicated by anemia, thrombocytopenia, hepatic encephalopathy, medication non-compliance), DVT, retroperitoneal hemorrhage, bleeding duodenal ulcer, ESBL UTI, seizure disorder, bipolar disorder. She has been hospitalized multiple times in 2023: 8/31-9/7--hepatic encephalopathy; 8/14-8/18--UTI with sepsis and encephalopathy; 7/15-8/3--bleeding duodenal ulcer requiring IR/GI intervention, hospital course complicated by treatment resistant UTI; 5/28-6/30--extended hospital stay, presented for hepatic encephalopathy vs non-convulsive status epilepticus, transferred from OSH, was intubated and put on propofol however EEGs generally demonstrated triphasic waves, she was taken off propofol and her AED regimen was simplified from Keppra, zonisamide, and Vimpat to just Keppra, discharged only on Keppra 1000mg BID. Current hospitalization is again for decompensated cirrhosis and hepatic encephalopathy in setting of presumed medication non-compliance. Baseline is unknown per primary team, however she was alert and oriented to person and sometimes to place. Per primary team, mentation waxes and wanes. However, for the last three days, they note that she has more frequently been oriented only to person. Additionally has been exhibiting a generalized tremor, which per primary is new. Had an aspiration event on 1/8/24, and antibiotics were escalated from Rocephin (SBP ppx) to Zosyn. Ammonia level has been high this admission (114, 115). She is currently on 1g Keppra BID, Lithium 300mg QHS, Zyprexa 2.5mg QHS,  lactulose and rifaximin, thiamine supplementation, and Zosyn as stated above. Primary team has involved palliative this admission. Neurology consulted due to mental status consistently being worse than at time of admission, along with generalized tremor, questioning if EEG and/or adjustments in AED regimen are needed.    Overview/Hospital Course:  1/10/24: on EEG, no seizures thus far; mentation continues to wax/wane--she is alert and oriented x0 this morning. Remains on 1g Keppra BID.  1/11/24: EEG demonstrates generalized slowing consistent with encephalopathy; alert and oriented to person this morning; able to move all four extremities and follow commands; some abnormal facial movements (brijesh-oral) observed.        Subjective:     Interval History: see hospital course.    Current Neurological Medications: see below.    Current Facility-Administered Medications   Medication Dose Route Frequency Provider Last Rate Last Admin    ALPRAZolam tablet 0.25 mg  0.25 mg Oral BID PRN Robert Mock MD   0.25 mg at 01/06/24 1743    amoxicillin-clavulanate 400-57 mg/5 mL suspension 400 mg  400 mg Oral Q12H Lenka Ortiz MD        folic acid 1 mg in dextrose 5 % (D5W) 100 mL IVPB  1 mg Intravenous Daily Lenka Ortiz MD        glucagon (human recombinant) injection 1 mg  1 mg Intramuscular PRN Robert Mock MD        glucose chewable tablet 16 g  16 g Oral PRN Robert Mock MD        glucose chewable tablet 24 g  24 g Oral PRN Robert Mock MD        insulin aspart U-100 pen 0-5 Units  0-5 Units Subcutaneous QID (AC + HS) PRN Robert Mock MD        lactulose 20 gram/30 mL solution Soln 20 g  20 g Oral TID Kaylee Chavez MD   20 g at 01/11/24 0956    levETIRAcetam injection 1,000 mg  1,000 mg Intravenous Q12H Lenka Ortiz MD        lithium CR tablet 300 mg  300 mg Oral QHS Robert Mock MD   300 mg at 01/10/24 2045    OLANZapine zydis disintegrating tablet 5 mg  5 mg Oral QHS Angel  Lenka BERMUDEZ MD        ondansetron injection 4 mg  4 mg Intravenous Q6H PRN Fernando Anderson MD   4 mg at 01/09/24 0135    pantoprazole injection 40 mg  40 mg Intravenous Daily Lenka Ortiz MD        prochlorperazine injection Soln 5 mg  5 mg Intravenous Q6H PRN Fernando Anderson MD        rifAXIMin tablet 550 mg  550 mg Oral BID Robert Mock MD   550 mg at 01/11/24 0955    sodium chloride 0.9% flush 10 mL  10 mL Intravenous PRN Robert Mock MD        thiamine (B-1) 100 mg in dextrose 5 % (D5W) 100 mL IVPB  100 mg Intravenous Daily Lenka Ortiz MD           Review of Systems   Unable to perform ROS: Mental status change     Objective:     Vital Signs (Most Recent):  Temp: 98.4 °F (36.9 °C) (01/11/24 1146)  Pulse: 67 (01/11/24 1146)  Resp: 14 (01/11/24 1146)  BP: (!) 119/58 (01/11/24 1146)  SpO2: 100 % (01/11/24 1146) Vital Signs (24h Range):  Temp:  [97.2 °F (36.2 °C)-98.9 °F (37.2 °C)] 98.4 °F (36.9 °C)  Pulse:  [64-80] 67  Resp:  [14-18] 14  SpO2:  [96 %-100 %] 100 %  BP: (104-129)/(51-72) 119/58     Weight: 58.1 kg (128 lb 1.4 oz)  Body mass index is 22.69 kg/m².     Physical Exam   Physical Exam  Vitals and nursing note reviewed.   Constitutional:       Appearance: She is ill-appearing.   HENT:      Head: Normocephalic and atraumatic.   Eyes:      General: Scleral icterus present.   Cardiovascular:      Pulses: Normal pulses.      Heart sounds: Normal heart sounds.   Pulmonary:      Effort: Pulmonary effort is normal.   Skin:     Coloration: Skin is jaundiced.      Neurological Exam:  MENTAL STATUS  Level of consciousness: alert  Orientation: oriented x0  Attention: poor  Concentration: poor  Speech: normal     Follows commands. No forced gaze deviation. No convulsions. Displays intermittent abnormal brijesh-oral movements.     CRANIAL NERVES  CN II: blinks to threat  CN III, IV, VI: PERRL  CN VII: facial expression symmetric and full       MOTOR EXAM  Muscle bulk: reduced throughout      Strength: 4-/5 bilateral upper extremities, 2 to 3-/5 bilateral lower extremities     REFLEXES    Biceps Brachioradialis Patellar Achilles   Right +3 +3 +2 +2   Left +3 +3 +2 +2      Planter reflex: down-going bilaterally. No clonus.     SENSORY EXAM  Withdraws to pain throughout all 4 extremities.     COORDINATION  Tremor: asterixis, generalized, can see in hands and feet while in repose  Gait: deferred       Significant Labs: All pertinent lab results from the past 24 hours have been reviewed.    Significant Imaging: I have reviewed all pertinent imaging results/findings within the past 24 hours.  Assessment and Plan:     Acute metabolic encephalopathy  58 year-old female with a complex past medical history including alcoholic cirrhosis complicated by acute decompensated liver failure, hepatic cirrhosis, anemia and thrombocytopenia further complicated by GI bleed, retroperitoneal bleed. Additional history includes seizure disorder and bipolar disorder. Multiple prior hospitalizations within the last year for encephalopathy (either hepatic and/or in setting of sepsis or other major medical events). Notably was hospitalized from 5/28/23 to 6/30/23 for hepatic encephalopathy vs non-convulsive status epilepticus: EEGs demonstrated predominantly triphasics/encephalopathy--AED regimen was de-escalated from Zonisamide, Vimpat and Keppra to Keppra 1000mg BID (was discharged on this). Currently hospitalized for acute decompensated cirrhosis and hepatic encephalopathy (also with pleural effusion: likely hepatic hydrothorax) in setting of presumed non-compliance with home lactulose. Baseline unknown per primary, however was reportedly alert and oriented to person and place on admission. Mentation has waxed/waned but primary says that she has more consistently been oriented only to person for the last three days. Has also been exhibiting generalized tremor, which per primary team is new. Hospital stay has been further  complicated by aspiration event on 1/8/23, requiring escalation of antibiotics: Rocephin (SBP ppx) to Zosyn. Neurology consulted due to tremor, encephalopathy, questioning whether EEG or AED adjustment is necessary. On labs, ammonia elevated, Lithium level borderline low, Keppra level in process (ordered after admission). Physical exam is notable for profound jaundice, asterixis (hands/feet). She is oriented to person/place at time of evaluation. Attention is poor, but does follow commands: has more difficulty with multi-step commands. No clonus, Babinski downgoing. No gaze deviation, automatisms, convulsions observed at bedside. With this in mind, doubt that encephalopathy is due to seizure: more likely due to toxic-metabolic +/- infection (recent aspiration event). Further agree that malnutrition/debility also likely contributes (ie has poor physiologic reserve). Likely also has poor neurologic reserve in setting of recurrent episodes of hepatic encephalopathy requiring multiple hospitalizations, making it more difficult for her to tolerate toxic-metabolic, etc. abnormalities. Regardless, in setting of history of seizure disorder, will still need to rule out with EEG. EEG in place on 1/10/24, preliminary report negative for seizure. Remained on EEG overnight.     This morning she is following commands, moves all four extremities, and is oriented to person. Remains poorly oriented to place. EEG demonstrates diffuse slowing consistent with encephalopathy. Does intermittently exhibit abnormal brijesh-oral movements on exam, however is not characteristic of automatisms particularly in setting of negative EEG. Still with marked asterixis on exam. With recurrent episodes of hepatic encephalopathy, whether patient will return to baseline or if new baseline will be established will take time to declare itself.    Recommendations  -discontinue EEG  -keep Keppra at 1000mg BID  ->level within therapeutic range  -please continue  to treat toxic/metabolic and infectious abnormalities (particularly serum sodium)  -please obtain repeat MRI brain w/o contrast prior to discharge  ->for evaluation of abnormal facial/brijesh-oral movements  -outpatient follow-up with neurology  -neurology will sign off  -contact us with questions/concerns        VTE Risk Mitigation (From admission, onward)           Ordered     Reason for No Pharmacological VTE Prophylaxis  Once        Question:  Reasons:  Answer:  Risk of Bleeding    01/05/24 0317     IP VTE HIGH RISK PATIENT  Once         01/05/24 0317     Place sequential compression device  Until discontinued         01/05/24 0317                    Sarmad Burnette, DO  Neurology  [facility]

## 2024-01-11 NOTE — SUBJECTIVE & OBJECTIVE
Medications:  Continuous Infusions:  Scheduled Meds:   amoxicillin-clavulanate  400 mg Oral Q12H    folic acid (FOLVITE) IVPB  1 mg Intravenous Daily    lactulose  20 g Oral TID    levETIRAcetam (Keppra) IV (PEDS and ADULTS)  1,000 mg Intravenous Q12H    lithium  300 mg Oral QHS    OLANZapine zydis  5 mg Oral QHS    pantoprazole  40 mg Intravenous Daily    rifAXIMin  550 mg Oral BID    thiamine (B-1) 100 mg in dextrose 5 % (D5W) 100 mL IVPB  100 mg Intravenous Daily     PRN Meds:ALPRAZolam, glucagon (human recombinant), glucose, glucose, insulin aspart U-100, ondansetron, prochlorperazine, sodium chloride 0.9%    Objective:     Vital Signs (Most Recent):  Temp: 98.3 °F (36.8 °C) (01/11/24 1622)  Pulse: 75 (01/11/24 1622)  Resp: 16 (01/11/24 1146)  BP: 131/62 (01/11/24 1622)  SpO2: 99 % (01/11/24 1622) Vital Signs (24h Range):  Temp:  [97.2 °F (36.2 °C)-98.4 °F (36.9 °C)] 98.3 °F (36.8 °C)  Pulse:  [64-80] 75  Resp:  [16-18] 16  SpO2:  [96 %-100 %] 99 %  BP: (104-131)/(51-72) 131/62     Weight: 58.1 kg (128 lb 1.4 oz)  Body mass index is 22.69 kg/m².       Physical Exam  Constitutional:       Appearance: She is ill-appearing.   Abdominal:      General: There is distension.   Skin:     Coloration: Skin is jaundiced.   Neurological:      Comments: Awake, oriented to person only.             Review of Symptoms      Symptom Assessment (ESAS 0-10 Scale)  Pain:  0  Dyspnea:  0  Anxiety:  0  Nausea:  0  Depression:  0  Anorexia:  0  Fatigue:  0  Insomnia:  0  Restlessness:  0  Agitation:  0 due to Acuity of condition         Pain Assessment in Advanced Demential Scale (PAINAD)   Breathing - Independent of vocalization:  0  Negative vocalization:  0  Facial expression:  0  Body language:  0  Consolability:  0  Total:  0    Performance Status:  50    Living Arrangements:  Lives in nursing home    Psychosocial/Cultural:   See Palliative Psychosocial Note: Yes  [facility] in [facility] after ICU stay in 2023. Sister is decision  maker who lives in [facility].   **Primary  to Follow**  Palliative Care  Consult: Yes        Advance Care Planning   Advance Directives:   Living Will: No    LaPOST: No    Do Not Resuscitate Status: Yes    Medical Power of : Documentation in chart states that pt wishes her sister Zaria to make decisions..      Decision Making:  Family answered questions and Patient unable to communicate due to disease severity/cognitive impairment  Goals of Care: What is most important right now is to focus on improvement in condition but with limits to invasive therapies. Accordingly, we have decided that the best plan to meet the patient's goals includes continuing with treatment.         Significant Labs: CBC:   Recent Labs   Lab 01/10/24  0557 01/11/24 0625   WBC 4.50 3.74*   HGB 8.0* 7.8*   HCT 25.9* 24.4*   PLT 47* 43*       CMP:   Recent Labs   Lab 01/10/24  0557 01/10/24  1947 01/11/24 0625   * 146* 149*   K 2.9* 2.9* 3.6   * 116* 122*   CO2 21* 24 22*   * 89 114*   BUN 11 10 10   CREATININE 0.5 0.4* 0.4*   CALCIUM 8.8 8.5* 8.5*   PROT 4.6*  --  4.4*   ALBUMIN 1.8*  --  1.9*   BILITOT 18.1*  --  17.2*   ALKPHOS 136*  --  130   AST 47*  --  45*   ALT 25  --  23   ANIONGAP 7* 6* 5*       CBC:   Recent Labs   Lab 01/11/24 0625   WBC 3.74*   HGB 7.8*   HCT 24.4*   *   PLT 43*       BMP:  Recent Labs   Lab 01/11/24 0625   *   *   K 3.6   *   CO2 22*   BUN 10   CREATININE 0.4*   CALCIUM 8.5*   MG 1.8       LFT:  Lab Results   Component Value Date    AST 45 (H) 01/11/2024    GGT 33 06/17/2023    ALKPHOS 130 01/11/2024    BILITOT 17.2 (H) 01/11/2024     Albumin:   Albumin   Date Value Ref Range Status   01/11/2024 1.9 (L) 3.5 - 5.2 g/dL Final     Protein:   Total Protein   Date Value Ref Range Status   01/11/2024 4.4 (L) 6.0 - 8.4 g/dL Final     Lactic acid:   Lab Results   Component Value Date    LACTATE 1.0 01/05/2024    LACTATE 2.3 (H)  11/29/2023       Significant Imaging: I have reviewed all pertinent imaging results/findings within the past 24 hours.

## 2024-01-11 NOTE — ASSESSMENT & PLAN NOTE
Impression: Marely Hamilton is a 57 y/o female with multiple hospitalizations over the past year who presents from the NH in which she resides. Pt with history of alcoholic cirrhosis, bipolar disorder, chronic anemia, thrombocytopenia, cholelithiasis, DVT with IVC filter in place, prior GI bleeds, ESBL UTI history, seizure disorder. Per chart review pt has been declined for a liver Tx twice for frailty, non-compliance, an combordidities. This morning she is jaundiced and awake, oriented only to person. She does state that Zaria (sister) is who to contact to make decisions. She does not appear to be in any acute distress and does not show any s/s of pain or labored breathing. She denies pain.     Reason for Consult: Per communication with Dr. Chavez, GO and ACP needed.     Advance Care Planning   Code Status  In light of the patients advanced and life limiting illness,I engaged the the family in a voluntary conversation about the patient's preferences for care  at the very end of life. The patient wishes to have a natural, peaceful death.  Along those lines, the patient does not wish to have CPR or other invasive treatments performed when her heart and/or breathing stops. I communicated to the family that a DNR order would be placed in her medical record to reflect this preference.  I spent a total of 25 minutes engaging the patient in this advance care planning discussion.        GOC:1/11/24: Spoke with Zaria today, she is planning on coming Sunday afternoon. Discussed current issues swallowing, negative seizures on EEG, psych consult to assure appropriate tx of mental health dz, and continued overall decline despite therapies/treatments. Understands that hospice is a very likely option. Sister reports that pt still has room available to her at [facility] if needed but continues to work on approval elsewhere.     1/9/24: Per my conversation/plan with sister ,Zaria, yesterday, I called her from the bedside this am  "with pt. present so that we could all be present for conversation. This morning pt is awake and oriented to person and intermittently oriented to time and place throughout our conversation. Zaria stressed to pt that she wanted to follow her wishes and asked pt if she was ok with DNR status. Pt replied "yes".   We discussed that pt continues to have intermittent periods of orientation, including during my time this morning. I reiterated to pt and sister that there are no options left for treatment of liver cirrhosis, which will continue to to manifest as progressive decline, leading to end of life. Dr. Chavez also spoke with sister yesterday explaining the same. Sister remains hopeful that pt will "pull thru again, like she has so many times before". Sister understands that pt is not able to move to [place] to live near her and is looking into other placement, as pt did not like [facility]. Sister is planning to visit this weekend to make further arrangements.     1/8/24: As pt was only oriented to self upon my visit this am, I called and spoke with sister Zaria ([phone]) who lives in [facility]. We discussed her advanced liver disease and lack of candidacy for transplant. Sister was surprised to hear this, and I have asked primary team to reach out for prognostication. I did explain liver disease and continued progression without transplant availability. Zaria did hope to get pt to [facility] nearer to her, but we discussed travel is unlikely at this time. Zaria shared that she wishes to fulfill her sisters wishes in any way possible and therefore agreed to DNR status, per conversation MD had with pt over the weekend. I notified Dr. Chavez of sisters request to place DNR. Zaria has been unable to talk to her sister and we arranged a time to make this phone call that works with her schedule tomorrow morning.     MPOA: Per chart review, pt has requested that Zaria be her decision maker if she could not. " Zaria state that she has MPOA, copy to be placed in chart.     Symptoms   Pt denies pain.   No other symptoms noted at this time.     Recommendations  -Continue to monitor for s/s of pain/ discomfort  - Sister would benefit from primary care discussion of prognosis in person   Topical Retinoid counseling:  Patient advised to apply a pea-sized amount only at bedtime and wait 30 minutes after washing their face before applying.  If too drying, patient may add a non-comedogenic moisturizer. The patient verbalized understanding of the proper use and possible adverse effects of retinoids.  All of the patient's questions and concerns were addressed.

## 2024-01-11 NOTE — ASSESSMENT & PLAN NOTE
Aspiration even AM of 1/9  Evaluated by SLP, diet changed to minced and now pureed 1/10.   Started Zosyn 1/9

## 2024-01-11 NOTE — PROGRESS NOTES
[facility] - Transplant [facility] Medicine  Progress Note    Patient Name: Marely Hamilton  MRN: [id number]  Patient Class: IP- Inpatient   Admission Date: 1/4/2024  Length of Stay: 6 days  Attending Physician: Lenka Ortiz MD  Primary Care Provider: Viktor Ross MD        Subjective:     Principal Problem:Decompensated hepatic cirrhosis        HPI:  Patient is a 58-year-old woman with past medical history significant for alcoholic cirrhosis, bipolar disorder, chronic anemia, thrombocytopenia, cholelithiasis, DVT with IVC filter in place, prior GI bleeds, ESBL UTI history, seizure disorder presenting from [facility] for evaluation of abdominal distension, peripheral edema, and jaundice.     Patient denies fever, chills, or abdominal pain. Patient was recently admitted in early December for hepatic encephalopathy.   Patient denies any pain at this time. She reports she has been urinating frequently though difficult to obtain history.   She denies recent illness/fever/chills/nausea/vomiting/diarrhea/constipation.  Patient has not been taking lactulose. Reports compliance with psychiatric medications and A&Ox3 but reports she can't take the lactulose due to not tolerating the sun.    Hospital Course:   In the ED, vitals stable but hypoxic on room air to the 80s, which improved with supplemental oxygen.  Presentation consistent with decompensated liver failure, meld score 22.    She was sent in by her nursing home facility for mild abdominal distention as well as significant jaundice.    Chest x-ray revealed a moderate-to-large pleural effusion with compressive atelectasis. Likely from her ascites. Her abdomen is soft, nontender.      Intake labs remarkable for Na 142, BUN 6, Cr 0.4, Calcium 7.8, , Tbili 12.1, , ALT 41, Ammonia 114, lactic 1.0.     Will admit for decompensated cirrhosis. Continue on lactulose for treatment of HE, rocephin for SBP ppx. Hepatology and IR consulted for  thoracentesis and further recommendations.   Patient is oriented to person, place, time, and situation but with some delusions.     Overview/Hospital Course:  Palliative care has been helping coordinate with sister. She is now DNR/DNI.     Follow nutrition goals and recommendations however follow aspiration precautions.     Appreciate psychiatry recommendations. Continue lithium and and decreased zyprexa.     Will discuss with neurology for her Keppra.     Escalated antibiotic coverage from rocephin to zosyn due to aspiration overnight on 1/9/24. Diet changed to pureed.     Jose evaluating EEG to r/o seizures, asked if can wean keppra. Keppra level is pending.        Interval History:   1/11: Patient extremely weak. Will try and avoid oral medications. EEG without seizure. DNR    Review of Systems   Unable to perform ROS: Mental status change   Constitutional:  Positive for appetite change. Negative for activity change and chills.   HENT:          Desquamation of lips   Respiratory:          Nasal canula in place   Gastrointestinal:         Incontinence of stools   Genitourinary:         Urinary incontinence   Skin:  Positive for color change.   Neurological:  Positive for tremors. Negative for speech difficulty.   Psychiatric/Behavioral:  Positive for confusion.      Objective:     Vital Signs (Most Recent):  Temp: 98.4 °F (36.9 °C) (01/11/24 1146)  Pulse: 67 (01/11/24 1146)  Resp: 14 (01/11/24 1146)  BP: (!) 119/58 (01/11/24 1146)  SpO2: 100 % (01/11/24 1146) Vital Signs (24h Range):  Temp:  [97.2 °F (36.2 °C)-98.9 °F (37.2 °C)] 98.4 °F (36.9 °C)  Pulse:  [64-80] 67  Resp:  [14-18] 14  SpO2:  [96 %-100 %] 100 %  BP: (104-129)/(51-72) 119/58     Weight: 58.1 kg (128 lb 1.4 oz)  Body mass index is 22.69 kg/m².    Intake/Output Summary (Last 24 hours) at 1/11/2024 1346  Last data filed at 1/11/2024 0600  Gross per 24 hour   Intake 1185.75 ml   Output 40 ml   Net 1145.75 ml         Physical Exam  Vitals reviewed.    Constitutional:       General: She is not in acute distress.     Appearance: She is cachectic. She is ill-appearing.   HENT:      Head: Normocephalic and atraumatic.      Mouth/Throat:      Mouth: Mucous membranes are dry.      Pharynx: No oropharyngeal exudate.      Comments: Desquamation of lips  Eyes:      General: Scleral icterus present.      Extraocular Movements: Extraocular movements intact.   Cardiovascular:      Rate and Rhythm: Normal rate and regular rhythm.      Heart sounds: Normal heart sounds. No murmur heard.  Pulmonary:      Effort: Pulmonary effort is normal. No respiratory distress.      Breath sounds: No wheezing.   Abdominal:      General: Bowel sounds are normal. There is no distension.      Palpations: Abdomen is soft.      Tenderness: There is no abdominal tenderness. There is no guarding.   Musculoskeletal:         General: No tenderness. Normal range of motion.      Cervical back: Normal range of motion and neck supple.   Lymphadenopathy:      Cervical: No cervical adenopathy.   Skin:     General: Skin is warm and dry.      Capillary Refill: Capillary refill takes less than 2 seconds.      Coloration: Skin is jaundiced.      Findings: No rash.   Neurological:      Mental Status: She is alert. She is disoriented.      Motor: Weakness: bilateral lower extremities.      Comments: Poor participation   Psychiatric:         Attention and Perception: She perceives visual hallucinations.         Behavior: Behavior is cooperative.         Thought Content: Thought content is delusional.             Significant Labs: All pertinent labs within the past 24 hours have been reviewed.    Significant Imaging: I have reviewed all pertinent imaging results/findings within the past 24 hours.    Assessment/Plan:      * Decompensated hepatic cirrhosis  Patient with known Cirrhosis with Child's class C. Co-morbidities are present and inclusive of ascites, malnutrition, anemia/pancytopenia, and Pleural effusion  .  MELD-Na score calculated; MELD 3.0: 32 at 1/10/2024  5:57 AM  MELD-Na: 27 at 1/10/2024  5:57 AM  Calculated from:  Serum Creatinine: 0.5 mg/dL (Using min of 1 mg/dL) at 1/10/2024  5:57 AM  Serum Sodium: 147 mmol/L (Using max of 137 mmol/L) at 1/10/2024  5:57 AM  Total Bilirubin: 18.1 mg/dL at 1/10/2024  5:57 AM  Serum Albumin: 1.8 g/dL at 1/10/2024  5:57 AM  INR(ratio): 2.4 at 1/10/2024  5:57 AM  Age at listing (hypothetical): 58 years  Sex: Female at 1/10/2024  5:57 AM      Continue chronic meds. Etiology likely ETOH. Will avoid any hepatotoxic meds, and monitor CBC/CMP/INR for synthetic function.     Presenting with decompensation (increased ascites, Bili 12, ammonia 114).  Status post thoracentesis of 650 mL.  Awaiting cultures.  Could not find a pocket for paracentesis    -restart lactulose as she has been noncompliant  Appreciate hepatology recommendations.      Recommendations to include physical therapy occupational therapy, palliative care discussions for goals of care.  She is not a candidate for transplantation given frailty, poor social support and noncompliance with medication    Aspiration of food  Event on 1/9.   SLP monitoring and diet changed from minced to pureed 1/10      Acute hypoxic respiratory failure  Patient with Hypoxic Respiratory failure which is Acute on chronic.  she is not on home oxygen. Supplemental oxygen was provided and noted improvement in her O2 sats.    -chest x-ray with right-sided pleural effusion.  Status post thoracentesis and 650 mL of fluid drained.  Awaiting cultures.  Oxygen requirements decreased to 2 L at this time.  Continue to monitor    Now with aspiration PNA. Conitnue zosyn   SLP eval and aspiration precautions.     Presence of IVC filter  Chronic IVC filter as she is at risk for bleeding and had history of DVTs      Goals of care, counseling/discussion  Appreciate palliative care facilitation with goals of care conversation.  Patient is now DNR and other  family meeting scheduled for tomorrow.    I also called and spoke to sister Zaria and she is on board and wants us to continue treating her for encephalopathy and infection and continue psychiatric medications.    Palliative care encounter  Appreciate palliative care recommendations and meeting with family that is her sister.  Family in agreement to make her DNR.  Documentation in place.  Code status updated    Another meeting with sister by palliative. More meetings needed, sister to come her pablo Saturday from [place]      Aspiration pneumonia of right lung  Aspiration even AM of 1/9  Evaluated by SLP, diet changed to minced and now pureed 1/10.   Started Zosyn 1/9        Acute metabolic encephalopathy  2/2 cirrhosis - non compliant with Lactulose, continue lactulose.   Polypharmacy - adjusted medications. Decreased Zyprexa.   SBP could not be ruled out as no pocket for ascites, now has aspiration and R sided on CXR 1/9, escalate Abx to zosyn     Poor nutrition. Added MVI. Ensure adequate nutrition   Severe debility     Seizure history, on keppra BID, asked neurology to weigh in and if EEG needed or if adjustment to keppra dose.     Palliative care facilitating family meetings. Sister to arrive from [place] on saturday          Hypernatremia  Patient has hypernatremia which is controlled. The hypernatremia is due to  Poor PO intake and Lasix . We will aim to correct the sodium by 8-10mEq in 24 hours. We will correct their hypernatremia with Select IV fluids: 5% albumin 12.5gms . The patient's sodium results have been reviewed and are listed below.  Recent Labs   Lab 01/10/24  0557   *     Will check Urine lytes    Bipolar I disorder, most recent episode depressed  Continue home regimen: lithium, olanzapine    Appreciate psychiatry recommendations we will decrease Zyprexa to 2.5mg.      Seizure  History of documented seizures since 2015.  Most recent encounter in June 2023 where she was on 1 g b.i.d. of  Keppra.  Continued on Keppra 1 g b.i.d..  Can consider checking an EEG ensure no seizures if she stays confused and verify with Neurology.  Pending Keppra level      Severe malnutrition  Nutrition consulted. Most recent weight and BMI monitored-     Measurements:  Wt Readings from Last 1 Encounters:   01/10/24 58.1 kg (128 lb 1.4 oz)   Body mass index is 22.69 kg/m².    Patient has been screened and assessed by RD.    Malnutrition Type:  Context: chronic illness  Level: severe    Malnutrition Characteristic Summary:  Subcutaneous Fat (Malnutrition):  (moderate to severe)  Muscle Mass (Malnutrition):  (moderate to severe)  Hand  Strength, Left (Malnutrition): reduced  Hand  Strength, Right (Malnutrition): reduced    Interventions/Recommendations (treatment strategy):  1.) Recommend continuing Low Na diet, fluid per MD. 2.) Recommend Boost Plus (or Boost equivalent) BID to help optimize PRO/Kcal intake (32% of the FR). 3.) Continue to provide folic acid and thiamine- consider adding MVI. RD to monitor PO intake, skin, labs, wt.        Macrocytic anemia  Patient's anemia is currently controlled. Has not received any PRBCs to date. Etiology likely d/t chronic disease due to Chronic liver disease  Current CBC reviewed-   Lab Results   Component Value Date    HGB 8.0 (L) 01/10/2024    HCT 25.9 (L) 01/10/2024     Monitor serial CBC and transfuse if patient becomes hemodynamically unstable, symptomatic or H/H drops below 7.    She is on chronic thiamine and folate.  We will add multivitamin    Ascites  Mild ascites seen on imaging however no pocket seen on attempt for paracentesis on 1/8/24        VTE Risk Mitigation (From admission, onward)           Ordered     Reason for No Pharmacological VTE Prophylaxis  Once        Question:  Reasons:  Answer:  Risk of Bleeding    01/05/24 0317     IP VTE HIGH RISK PATIENT  Once         01/05/24 0317     Place sequential compression device  Until discontinued         01/05/24  0317                    Discharge Planning   BRAYAN: 1/15/2024     Code Status: DNR   Is the patient medically ready for discharge?: No    Reason for patient still in hospital (select all that apply): Patient trending condition  Discharge Plan A: Return to nursing home                  Lenka Ortiz MD  Department of Hospital Medicine   [facility] - Transplant Stepdown

## 2024-01-11 NOTE — ASSESSMENT & PLAN NOTE
History of documented seizures since 2015.  Most recent encounter in June 2023 where she was on 1 g b.i.d. of Keppra.  Continued on Keppra 1 g b.i.d..  Can consider checking an EEG ensure no seizures if she stays confused and verify with Neurology.  Pending Keppra level

## 2024-01-11 NOTE — PROGRESS NOTES
[facility] - Transplant [facility] Medicine  Progress Note    Patient Name: Marely Hamilton  MRN: [id number]  Patient Class: IP- Inpatient   Admission Date: 1/4/2024  Length of Stay: 5 days  Attending Physician: Kaylee Chavez MD  Primary Care Provider: Viktor Ross MD        Subjective:     Principal Problem:Decompensated hepatic cirrhosis        HPI:  Patient is a 58-year-old woman with past medical history significant for alcoholic cirrhosis, bipolar disorder, chronic anemia, thrombocytopenia, cholelithiasis, DVT with IVC filter in place, prior GI bleeds, ESBL UTI history, seizure disorder presenting from nursing [facility] for evaluation of abdominal distension, peripheral edema, and jaundice.     Patient denies fever, chills, or abdominal pain. Patient was recently admitted in early December for hepatic encephalopathy.   Patient denies any pain at this time. She reports she has been urinating frequently though difficult to obtain history.   She denies recent illness/fever/chills/nausea/vomiting/diarrhea/constipation.  Patient has not been taking lactulose. Reports compliance with psychiatric medications and A&Ox3 but reports she can't take the lactulose due to not tolerating the sun.    Hospital Course:   In the ED, vitals stable but hypoxic on room air to the 80s, which improved with supplemental oxygen.  Presentation consistent with decompensated liver failure, meld score 22.    She was sent in by her nursing home facility for mild abdominal distention as well as significant jaundice.    Chest x-ray revealed a moderate-to-large pleural effusion with compressive atelectasis. Likely from her ascites. Her abdomen is soft, nontender.      Intake labs remarkable for Na 142, BUN 6, Cr 0.4, Calcium 7.8, , Tbili 12.1, , ALT 41, Ammonia 114, lactic 1.0.     Will admit for decompensated cirrhosis. Continue on lactulose for treatment of HE, rocephin for SBP ppx. Hepatology and IR consulted for  thoracentesis and further recommendations.   Patient is oriented to person, place, time, and situation but with some delusions.     Overview/Hospital Course:  Palliative care has been helping coordinate with sister. She is now DNR/DNI.     Follow nutrition goals and recommendations however follow aspiration precautions.     Appreciate psychiatry recommendations. Continue lithium and and decreased zyprexa.     Will discuss with neurology for her Keppra.     Escalated antibiotic coverage from rocephin to zosyn due to aspiration overnight on 1/9/24. Diet changed to pureed.     Jose evaluating EEG to r/o seizures, asked if can wean keppra. Keppra level is pending.        Interval History:   Alert however only oriented to self.      EEG started this morning, 45 event reviewed, no seizure noted, keppra level pending.     Diet changed to pureed per SLP eval.     Continued to be incontinent of stool and urine.     Review of Systems   Unable to perform ROS: Mental status change   Constitutional:  Positive for appetite change. Negative for activity change and chills.   HENT:          Desquamation of lips   Respiratory:          Nasal canula in place   Gastrointestinal:         Incontinence of stools   Genitourinary:         Urinary incontinence   Skin:  Positive for color change.   Neurological:  Positive for tremors. Negative for speech difficulty.   Psychiatric/Behavioral:  Positive for confusion.      Objective:     Vital Signs (Most Recent):  Temp: 98.9 °F (37.2 °C) (01/10/24 1516)  Pulse: 77 (01/10/24 1537)  Resp: 14 (01/10/24 1516)  BP: 127/64 (01/10/24 1516)  SpO2: 98 % (01/10/24 1516) Vital Signs (24h Range):  Temp:  [98.5 °F (36.9 °C)-99.3 °F (37.4 °C)] 98.9 °F (37.2 °C)  Pulse:  [] 77  Resp:  [14-18] 14  SpO2:  [93 %-99 %] 98 %  BP: ()/(52-64) 127/64     Body mass index is 22.69 kg/m².    Physical Exam  Vitals reviewed.   Constitutional:       General: She is not in acute distress.     Appearance: She  is cachectic. She is ill-appearing.   HENT:      Head: Normocephalic and atraumatic.      Mouth/Throat:      Mouth: Mucous membranes are dry.      Pharynx: No oropharyngeal exudate.      Comments: Desquamation of lips  Eyes:      General: Scleral icterus present.      Extraocular Movements: Extraocular movements intact.   Cardiovascular:      Rate and Rhythm: Normal rate and regular rhythm.      Heart sounds: Normal heart sounds. No murmur heard.  Pulmonary:      Effort: Pulmonary effort is normal. No respiratory distress.      Breath sounds: No wheezing.   Abdominal:      General: Bowel sounds are normal. There is no distension.      Palpations: Abdomen is soft.      Tenderness: There is no abdominal tenderness. There is no guarding.   Musculoskeletal:         General: No tenderness. Normal range of motion.      Cervical back: Normal range of motion and neck supple.   Lymphadenopathy:      Cervical: No cervical adenopathy.   Skin:     General: Skin is warm and dry.      Capillary Refill: Capillary refill takes less than 2 seconds.      Coloration: Skin is jaundiced.      Findings: No rash.   Neurological:      Mental Status: She is alert. She is disoriented.      Motor: Weakness: bilateral lower extremities.      Comments: Poor participation   Psychiatric:         Attention and Perception: She perceives visual hallucinations.         Behavior: Behavior is cooperative.         Thought Content: Thought content is delusional.        Significant Labs: All pertinent labs within the past 24 hours have been reviewed.  Significant Imaging: I have reviewed all pertinent imaging results/findings within the past 24 hours.    Assessment/Plan:      * Decompensated hepatic cirrhosis  Patient with known Cirrhosis with Child's class C. Co-morbidities are present and inclusive of ascites, malnutrition, anemia/pancytopenia, and Pleural effusion .  MELD-Na score calculated; MELD 3.0: 32 at 1/10/2024  5:57 AM  MELD-Na: 27 at 1/10/2024   5:57 AM  Calculated from:  Serum Creatinine: 0.5 mg/dL (Using min of 1 mg/dL) at 1/10/2024  5:57 AM  Serum Sodium: 147 mmol/L (Using max of 137 mmol/L) at 1/10/2024  5:57 AM  Total Bilirubin: 18.1 mg/dL at 1/10/2024  5:57 AM  Serum Albumin: 1.8 g/dL at 1/10/2024  5:57 AM  INR(ratio): 2.4 at 1/10/2024  5:57 AM  Age at listing (hypothetical): 58 years  Sex: Female at 1/10/2024  5:57 AM      Continue chronic meds. Etiology likely ETOH. Will avoid any hepatotoxic meds, and monitor CBC/CMP/INR for synthetic function.     Presenting with decompensation (increased ascites, Bili 12, ammonia 114).  Status post thoracentesis of 650 mL.  Awaiting cultures.  Could not find a pocket for paracentesis    -restart lactulose as she has been noncompliant  Appreciate hepatology recommendations.      Recommendations to include physical therapy occupational therapy, palliative care discussions for goals of care.  She is not a candidate for transplantation given frailty, poor social support and noncompliance with medication    Aspiration of food  Event on 1/9.   SLP monitoring and diet changed from minced to pureed 1/10      Acute hypoxic respiratory failure  Patient with Hypoxic Respiratory failure which is Acute on chronic.  she is not on home oxygen. Supplemental oxygen was provided and noted improvement in her O2 sats.    -chest x-ray with right-sided pleural effusion.  Status post thoracentesis and 650 mL of fluid drained.  Awaiting cultures.  Oxygen requirements decreased to 2 L at this time.  Continue to monitor    Now with aspiration PNA. Conitnue zosyn   SLP eval and aspiration precautions.     Presence of IVC filter  Chronic IVC filter as she is at risk for bleeding and had history of DVTs      Goals of care, counseling/discussion  Appreciate palliative care facilitation with goals of care conversation.  Patient is now DNR and other family meeting scheduled for tomorrow.    I also called and spoke to sister Zaria and she is  on board and wants us to continue treating her for encephalopathy and infection and continue psychiatric medications.    Palliative care encounter  Appreciate palliative care recommendations and meeting with family that is her sister.  Family in agreement to make her DNR.  Documentation in place.  Code status updated    Another meeting with sister by palliative. More meetings needed, sister to come her pablo Saturday from [place]      Aspiration pneumonia of right lung  Aspiration even AM of 1/9  Evaluated by SLP, diet changed to minced and now pureed 1/10.   Started Zosyn 1/9        Acute metabolic encephalopathy  2/2 cirrhosis - non compliant with Lactulose, continue lactulose.   Polypharmacy - adjusted medications. Decreased Zyprexa.   SBP could not be ruled out as no pocket for ascites, now has aspiration and R sided on CXR 1/9, escalate Abx to zosyn     Poor nutrition. Added MVI. Ensure adequate nutrition   Severe debility     Seizure history, on keppra BID, asked neurology to weigh in and if EEG needed or if adjustment to keppra dose.     Palliative care facilitating family meetings. Sister to arrive from [place] on saturday          Hypernatremia  Patient has hypernatremia which is controlled. The hypernatremia is due to  Poor PO intake and Lasix . We will aim to correct the sodium by 8-10mEq in 24 hours. We will correct their hypernatremia with [facility] IV fluids: 5% albumin 12.5gms . The patient's sodium results have been reviewed and are listed below.  Recent Labs   Lab 01/10/24  0557   *     Will check Urine lytes    Bipolar I disorder, most recent episode depressed  Continue home regimen: lithium, olanzapine    Appreciate psychiatry recommendations we will decrease Zyprexa to 2.5mg.      Seizure  History of documented seizures since 2015.  Most recent encounter in June 2023 where she was on 1 g b.i.d. of Keppra.  Continued on Keppra 1 g b.i.d..  Can consider checking an EEG ensure no seizures if she stays  confused and verify with Neurology.  Pending Keppra level      Severe malnutrition  Nutrition consulted. Most recent weight and BMI monitored-     Measurements:  Wt Readings from Last 1 Encounters:   01/10/24 58.1 kg (128 lb 1.4 oz)   Body mass index is 22.69 kg/m².    Patient has been screened and assessed by RD.    Malnutrition Type:  Context: chronic illness  Level: severe    Malnutrition Characteristic Summary:  Subcutaneous Fat (Malnutrition):  (moderate to severe)  Muscle Mass (Malnutrition):  (moderate to severe)  Hand  Strength, Left (Malnutrition): reduced  Hand  Strength, Right (Malnutrition): reduced    Interventions/Recommendations (treatment strategy):  1.) Recommend continuing Low Na diet, fluid per MD. 2.) Recommend Boost Plus (or Boost equivalent) BID to help optimize PRO/Kcal intake (32% of the FR). 3.) Continue to provide folic acid and thiamine- consider adding MVI. RD to monitor PO intake, skin, labs, wt.        Macrocytic anemia  Patient's anemia is currently controlled. Has not received any PRBCs to date. Etiology likely d/t chronic disease due to Chronic liver disease  Current CBC reviewed-   Lab Results   Component Value Date    HGB 8.0 (L) 01/10/2024    HCT 25.9 (L) 01/10/2024     Monitor serial CBC and transfuse if patient becomes hemodynamically unstable, symptomatic or H/H drops below 7.    She is on chronic thiamine and folate.  We will add multivitamin    Ascites  Mild ascites seen on imaging however no pocket seen on attempt for paracentesis on 1/8/24        VTE Risk Mitigation (From admission, onward)           Ordered     Reason for No Pharmacological VTE Prophylaxis  Once        Question:  Reasons:  Answer:  Risk of Bleeding    01/05/24 0317     IP VTE HIGH RISK PATIENT  Once         01/05/24 0317     Place sequential compression device  Until discontinued         01/05/24 0317                    Discharge Planning   BRAYAN: 1/11/2024     Code Status: DNR   Is the patient  medically ready for discharge?: No    Reason for patient still in hospital (select all that apply): Laboratory test, Treatment, Imaging, and Consult recommendations  Discharge Plan A: Return to nursing home                  Kaylee Chavez MD  Department of Hospital Medicine   [facility] - Transplant Stepdown

## 2024-01-11 NOTE — PLAN OF CARE
[facility] - Transplant Stepdown  Discharge Reassessment    Primary Care Provider: Viktor Ross MD    Expected Discharge Date: 1/15/2024    Reassessment (most recent)       Discharge Reassessment - 01/11/24 1003          Discharge Reassessment    Assessment Type Discharge Planning Reassessment     Discharge Plan discussed with: POA     Communicated BRAYAN with patient/caregiver Date not available/Unable to determine     Discharge Plan A Return to nursing home     Discharge Plan B Assisted Living     DME Needed Upon Discharge  none     Transition of Care Barriers None     Why the patient remains in the hospital Requires continued medical care                     Discharge Plan A and Plan B have been determined by review of patient's clinical status, future medical and therapeutic needs, and coverage/benefits for post-acute care in coordination with multidisciplinary team members.     Logan Modi LMSW  Case Management [facility]

## 2024-01-11 NOTE — CARE UPDATE
RAPID RESPONSE NURSE ROUND       Rounding completed with charge RNKatt for fluctuating HR reports patient returned to baseline. No additional concerns verbalized at this time. Instructed to call [phone] for further concerns or assistance.

## 2024-01-11 NOTE — PLAN OF CARE
Recommendations    If unable to tolerated PO intake and TF warranted: Isosource 1.5 @ goal rate 45ml/hr to provide 1620kcal, 73g protein, 825ml FW  Initiate @ 20ml/hr and increase 5 ml/hr every 4-6 hours as tolerated to reach goal rate     Monitor refeeding syndrome   Supplement with thiamine and vitamin B complex  Monitor serum potassium, magnesium and phosphorus    Rec'd Regular diet as tolerate with texture per SLP + Boost Plus TID    RD to monitor and and follow    Goals: to meet % of EEN/EPn by next RD f/u  Nutrition Goal Status: new  Communication of RD Recs:  (POC)

## 2024-01-11 NOTE — PT/OT/SLP PROGRESS
Speech Language Pathology Treatment    Patient Name:  Marely Hamilton   MRN:  [id number]  Admitting Diagnosis: Decompensated hepatic cirrhosis    Recommendations:                 General Recommendations:  Dysphagia therapy  Diet recommendations:  NPO, NPO   Aspiration Precautions:   Pleasure feedings of ice chips sparingly when awake/participating  Meds crushed in applesauce  General Precautions: Standard, aspiration, fall  Communication strategies:  none    Assessment:     Marely Hamilton is a 58 y.o. female with an SLP diagnosis of Dysphagia. She presents with worsening tolerance of oral trials across most recent SLP sessions with poor levels of alertness and concern for weakened pharyngeal swallow given overall clinical presentation. Pt is not safe for continuation of an oral diet at this time. SLP to continue to follow.      Subjective     Spoke with nursing prior to session. Pt found resting in bed upon SLP entry into room.     Patient goals: none stated     Pain/Comfort:  Pain Rating 1: 0/10    Respiratory Status: Room air    Objective:     Has the patient been evaluated by SLP for swallowing?   Yes  Keep patient NPO? No   Current Respiratory Status:        Pt seen for ongoing dysphagia therapy. EEG in progress throughout session. Pt with poor levels of alertness this date, requiring consistent MOD verbal and tactile cueing to sustain brief periods of minimal acceptable levels of alertness for PO intake. No verbalizations or vocalizations elicited this date. HOB elevated for oral intake. Pt consumed x3 ice chips with increased oral manipulation time though adequate oral clearance. Decreased hyolaryngeal movement noted during palpation of swallows. Bites of applesauce resulted in increasing number of swallows as trials progressed in addition to increased lingual and hyolaryngeal pumping during attempts at subsequent swallows. Extraneous labial and lingual movements noted following PO attempts despite no observed  oral residue. PO trials ultimately discontinued 2/2 high risk for aspiration and worsening lethargy. SLP provided education regarding overall impressions, re-initiation of NPO status, and ongoing SLP POC though pt unable to exhibit understanding this date. Bed left in lowest locked position upon SLP exit. Whiteboard updated.      Goals:   Multidisciplinary Problems       SLP Goals          Problem: SLP    Goal Priority Disciplines Outcome   SLP Goal     SLP Ongoing, Progressing   Description: Speech Language Pathology Goals  Goals expected to be met by 1/23    1. Pt will tolerate least restrictive PO diet without any overt s/sx of airway compromise.                                Plan:     Patient to be seen:  3 x/week   Plan of Care expires:  02/08/24  Plan of Care reviewed with:  patient   SLP Follow-Up:  Yes       Discharge recommendations:   (TBD)   Barriers to Discharge:   NPO without alternative means     Time Tracking:     SLP Treatment Date:   01/11/24  Speech Start Time:  0912  Speech Stop Time:  0923     Speech Total Time (min):  11 min    Billable Minutes: Treatment Swallowing Dysfunction 11      01/11/2024

## 2024-01-11 NOTE — PROGRESS NOTES
EEG Disconnect  PM Check Electrodes had to be fixed.No    Skin Integrity: Normal   No signs of skin breakdown seen during disconnect  Enedina Frey   01/11/2024 1:15 PM

## 2024-01-11 NOTE — ASSESSMENT & PLAN NOTE
58 year-old female with a complex past medical history including alcoholic cirrhosis complicated by acute decompensated liver failure, hepatic cirrhosis, anemia and thrombocytopenia further complicated by GI bleed, retroperitoneal bleed. Additional history includes seizure disorder and bipolar disorder. Multiple prior hospitalizations within the last year for encephalopathy (either hepatic and/or in setting of sepsis or other major medical events). Notably was hospitalized from 5/28/23 to 6/30/23 for hepatic encephalopathy vs non-convulsive status epilepticus: EEGs demonstrated predominantly triphasics/encephalopathy--AED regimen was de-escalated from Zonisamide, Vimpat and Keppra to Keppra 1000mg BID (was discharged on this). Currently hospitalized for acute decompensated cirrhosis and hepatic encephalopathy (also with pleural effusion: likely hepatic hydrothorax) in setting of presumed non-compliance with home lactulose. Baseline unknown per primary, however was reportedly alert and oriented to person and place on admission. Mentation has waxed/waned but primary says that she has more consistently been oriented only to person for the last three days. Has also been exhibiting generalized tremor, which per primary team is new. Hospital stay has been further complicated by aspiration event on 1/8/23, requiring escalation of antibiotics: Rocephin (SBP ppx) to Zosyn. Neurology consulted due to tremor, encephalopathy, questioning whether EEG or AED adjustment is necessary. On labs, ammonia elevated, Lithium level borderline low, Keppra level in process (ordered after admission). Physical exam is notable for profound jaundice, asterixis (hands/feet). She is oriented to person/place at time of evaluation. Attention is poor, but does follow commands: has more difficulty with multi-step commands. No clonus, Babinski downgoing. No gaze deviation, automatisms, convulsions observed at bedside. With this in mind, doubt that  encephalopathy is due to seizure: more likely due to toxic-metabolic +/- infection (recent aspiration event). Further agree that malnutrition/debility also likely contributes (ie has poor physiologic reserve). Likely also has poor neurologic reserve in setting of recurrent episodes of hepatic encephalopathy requiring multiple hospitalizations, making it more difficult for her to tolerate toxic-metabolic, etc. abnormalities. Regardless, in setting of history of seizure disorder, will still need to rule out with EEG. EEG in place on 1/10/24, preliminary report negative for seizure. Remained on EEG overnight.     This morning she is following commands, moves all four extremities, and is oriented to person. Remains poorly oriented to place. EEG demonstrates diffuse slowing consistent with encephalopathy. Does intermittently exhibit abnormal brijesh-oral movements on exam, however is not characteristic of automatisms particularly in setting of negative EEG. Still with marked asterixis on exam. With recurrent episodes of hepatic encephalopathy, whether patient will return to baseline or if new baseline will be established will take time to declare itself.    Recommendations  -discontinue EEG  -keep Keppra at 1000mg BID  ->level within therapeutic range  -please continue to treat toxic/metabolic and infectious abnormalities (particularly serum sodium)  -please obtain repeat MRI brain w/o contrast prior to discharge  ->for evaluation of abnormal facial/brijesh-oral movements  -outpatient follow-up with neurology  -neurology will sign off  -contact us with questions/concerns

## 2024-01-11 NOTE — PLAN OF CARE
· Speech  therapy seen pt and no po intake at this time due to swallowing problems. Dr. Ortiz aware and med's changed to IV  Palliative care is following.   ·Hepatology following.   ·PO lactulose  Held   · Patient is incontinent of urine and bowels.   ·Sacral redness, cream applied, wound care following.   ·Q2h turns  ·Bed is in lowest position with wheels locked. Call light remains within reach.

## 2024-01-11 NOTE — PROCEDURES
Date of service  1/10/24    Introduction  Electroencephalographic (EEG) is recorded with electrodes placed according to the International 10-20 placement system.  Thirty two (32) channels  of digital signal (sampling rate of 512/sec), including T1 and T2, were simultaneously recorded from the scalp and may include EKG, EMG, and/or eye monitors.  Recording band pass was 0.1 to 512 Hz.  Digital video recording of the patient is simultaneously recorded with the EEG.  The patient is instructed to report clinical symptoms which may occur during the recording session.  EEG and video recording are stored and archived in digital format.  Activation procedures, which include photic stimulation, hyperventilation and instructing patients to perform simple tasks, are done in selected patients.    The EEG is displayed on a monitor screen and can be reviewed using different montages.  Computer-assisted analysis is employed to detect spike and electrographic seizure activity.   The entire record is submitted for computer analysis.  The entire recording is visually reviewed, and the times identified by computer analysis as being spikes or seizures are reviewed again.      Compressed spectral analysis (CSA) is also performed on the activity recorded from each individual channel.  This is displayed as a power display of frequencies from 0 to 30 Hz over time.   The CSA is reviewed looking for asymmetries in power between homologous areas of the scalp, then compared with the original EEG recording.      Cell-A-Spot software was also utilized in the review of this study. This software suite analyzes the EEG recording in multiple domains. Coherence and rhythmicity are computed to identify EEG sections which may contain organized seizures. Each channel undergoes analysis to detect the presence of spike and sharp waves which have special and morphological characteristics of epileptic activity. The routine EEG recording is converted from  special into frequency domain. This is then displayed comparing homologous areas to identify areas of significant asymmetry. Algorithm to identify non-cortically generated artifact is used to separate artifact from the EEG.    Recording Times  1/10/24 09:56 - 1/11/24 07:00  (21:15)  1/11/24 07:00-11:28 (4:28)  A total of 25 hours and 43 minutes of EEG/video telemetry was recorded.    Findings  The patient's background consists of 9 Hz posterior dominant alpha rhythm. There is intermittent diffuse 5-7 Hz theta slowing. Of note, there is frequent 6 Hz tremor artifact throughout the recording.     The patient was noted to be awake, drowsy, and fell asleep with poorly formed sleep architecture seen.     Hyperventilation and photic stimulation were not performed.     There is no focality to the slowing.  There are no focal, lateralized, or epileptiform transients.  No electrographic seizures are seen.    EKG demonstrates regular rhythm.    Interpretation  This is an abnormal LTM-EEG due to the presence of diffuse slowing as described above.  These findings are indicative of an encephalopathy, though they are not specific for a particular underlying etiology.

## 2024-01-11 NOTE — PROGRESS NOTES
[facility]  Adult Nutrition  Progress Note    SUMMARY       Recommendations    If unable to tolerated PO intake and TF warranted: Isosource 1.5 @ goal rate 45ml/hr to provide 1620kcal, 73g protein, 825ml FW  Initiate @ 20ml/hr and increase 5 ml/hr every 4-6 hours as tolerated to reach goal rate     Monitor refeeding syndrome   Supplement with thiamine and vitamin B complex  Monitor serum potassium, magnesium and phosphorus    Rec'd Regular diet as tolerate with texture per SLP + Boost Plus TID    RD to monitor and and follow    Goals: to meet % of EEN/EPn by next RD f/u  Nutrition Goal Status: new  Communication of RD Recs:  (POC)    Assessment and Plan    Endocrine  Severe malnutrition  Malnutrition Type:  Context: chronic illness  Level: severe    Related to (etiology):   ESLD    Signs and Symptoms (as evidenced by):   Moderate to severe muscle/fat wasting, dull and thinning hair, spoon nails, reduced hand      Malnutrition Characteristic Summary:  Subcutaneous Fat (Malnutrition):  (moderate to severe)  Muscle Mass (Malnutrition):  (moderate to severe)  Hand  Strength, Left (Malnutrition): reduced  Hand  Strength, Right (Malnutrition): reduced      Interventions/Recommendations (treatment strategy):  Collaboration with other providers    Nutrition Diagnosis Status:   Continue    Malnutrition Assessment  Malnutrition Context: chronic illness  Malnutrition Level: severe  Skin (Micronutrient): yellow  Nails (Micronutrient): nail ends curved, spoon-shaped  Hair/Scalp (Micronutrient): dull, fine, sparse, scaly/flaky   Micronutrient Evaluation Summary: suspected deficiency  Micronutrient Evaluation Comments: B- vitamin deficiency   Subcutaneous Fat (Malnutrition):  (moderate to severe)  Muscle Mass (Malnutrition):  (moderate to severe)  Hand  Strength, Left (Malnutrition): reduced  Hand  Strength, Right (Malnutrition): reduced   Orbital Region (Subcutaneous Fat Loss): severe  "depletion  Upper Arm Region (Subcutaneous Fat Loss): severe depletion  Thoracic and Lumbar Region: moderate depletion   Jainism Region (Muscle Loss): severe depletion  Clavicle Bone Region (Muscle Loss): moderate depletion  Clavicle and Acromion Bone Region (Muscle Loss): moderate depletion  Scapular Bone Region (Muscle Loss): moderate depletion  Dorsal Hand (Muscle Loss): severe depletion     Reason for Assessment    Reason For Assessment: RD follow-up  Diagnosis: liver disease (Acute liver failure without hepatic coma)  Relevant Medical History: alcoholic cirrhosis, bipolar disorder, chronic anemia, thrombocytopenia, cholelithiasis, DVT, h/o GI bleeds  Interdisciplinary Rounds: did not attend    General Information Comments:   RD f/u. Pt appears disoriented and non verbal during RD visits. Worsening tolerance of PO intake, SLP rec'd NPO today. Per previous assessment: Pt states that #, #. Mild edema present. Wt has appeared stable x 12 mo. NFPE performed on 1/6: RD feels pt does meet the criteria of severe malnutrition in the context of chronic illness. Please see PES for details.     Nutrition Discharge Planning: adequate PO intake and ONS of choice    Nutrition Risk Screen    Nutrition Risk Screen: difficulty chewing/swallowing    Nutrition/Diet History    Patient Reported Diet/Restrictions/Preferences: general  Typical Food/Fluid Intake: 3 meals per day  Spiritual, Cultural Beliefs, Worship Practices, Values that Affect Care: no  Supplemental Drinks or Food Habits: Boost Original (x1/day)    Anthropometrics    Temp: 98.4 °F (36.9 °C)  Height: 5' 3" (160 cm)  Height (inches): 63 in  Weight Method: Bed Scale  Weight: 58.1 kg (128 lb 1.4 oz)  Weight (lb): 128.09 lb  Ideal Body Weight (IBW), Female: 115 lb  % Ideal Body Weight, Female (lb): 111.38 %  BMI (Calculated): 22.7  BMI Grade: 18.5-24.9 - normal       Lab/Procedures/Meds    Pertinent Labs Reviewed: reviewed  Pertinent Labs Comments: H/H: " 7.8/24.4, Na 149, Cr 0.4, Tprotein 4.4, albumin 1.9, Tbili 17.2, AST 45, phos 1.8  Pertinent Medications Reviewed: reviewed  Pertinent Medications Comments: folic acid, pantoprazole, thiamine, lactulose    Estimated/Assessed Needs    Weight Used For Calorie Calculations: 58.1 kg (128 lb 1.4 oz)  Energy Calorie Requirements (kcal): 1452- 1743 kcal  Energy Need Method: Kcal/kg (25-30 kcal/kg)  Protein Requirements: 70g (1.2g/kg)  Weight Used For Protein Calculations: 58.1 kg (128 lb 1.4 oz)  Fluid Requirements (mL): 1ml/1kcal or per MD  Estimated Fluid Requirement Method: RDA Method  RDA Method (mL): 1452     Nutrition Prescription Ordered    Current Diet Order: Low Na pureed  Oral Nutrition Supplement: Boost Breeze    Evaluation of Received Nutrient/Fluid Intake    I/O: + 3.9 L since admit  Energy Calories Required: not meeting needs  Protein Required: not meeting needs  Fluid Required:  (as per MD)  Comments: LBM 1/10  Tolerance: tolerating    Nutrition Risk    Level of Risk/Frequency of Follow-up:  (1x/week)     Monitor and Evaluation    Food and Nutrient Intake: energy intake, food and beverage intake  Food and Nutrient Adminstration: diet order  Knowledge/Beliefs/Attitudes: beliefs and attitudes  Physical Activity and Function: nutrition-related ADLs and IADLs  Anthropometric Measurements: weight, weight change, body mass index  Biochemical Data, Medical Tests and Procedures: electrolyte and renal panel, glucose/endocrine profile, inflammatory profile, lipid profile  Nutrition-Focused Physical Findings: overall appearance, skin     Nutrition Follow-Up    RD Follow-up?: Yes

## 2024-01-11 NOTE — ASSESSMENT & PLAN NOTE
Patient's anemia is currently controlled. Has not received any PRBCs to date. Etiology likely d/t chronic disease due to Chronic liver disease  Current CBC reviewed-   Lab Results   Component Value Date    HGB 8.0 (L) 01/10/2024    HCT 25.9 (L) 01/10/2024     Monitor serial CBC and transfuse if patient becomes hemodynamically unstable, symptomatic or H/H drops below 7.    She is on chronic thiamine and folate.  We will add multivitamin

## 2024-01-11 NOTE — ASSESSMENT & PLAN NOTE
Nutrition consulted. Most recent weight and BMI monitored-     Measurements:  Wt Readings from Last 1 Encounters:   01/10/24 58.1 kg (128 lb 1.4 oz)   Body mass index is 22.69 kg/m².    Patient has been screened and assessed by RD.    Malnutrition Type:  Context: chronic illness  Level: severe    Malnutrition Characteristic Summary:  Subcutaneous Fat (Malnutrition):  (moderate to severe)  Muscle Mass (Malnutrition):  (moderate to severe)  Hand  Strength, Left (Malnutrition): reduced  Hand  Strength, Right (Malnutrition): reduced    Interventions/Recommendations (treatment strategy):  1.) Recommend continuing Low Na diet, fluid per MD. 2.) Recommend Boost Plus (or Boost equivalent) BID to help optimize PRO/Kcal intake (32% of the FR). 3.) Continue to provide folic acid and thiamine- consider adding MVI. RD to monitor PO intake, skin, labs, wt.

## 2024-01-11 NOTE — SUBJECTIVE & OBJECTIVE
Interval History:   Alert however only oriented to self.      EEG started this morning, 45 event reviewed, no seizure noted, keppra level pending.     Diet changed to pureed per SLP eval.     Continued to be incontinent of stool and urine.     Review of Systems   Unable to perform ROS: Mental status change   Constitutional:  Positive for appetite change. Negative for activity change and chills.   HENT:          Desquamation of lips   Respiratory:          Nasal canula in place   Gastrointestinal:         Incontinence of stools   Genitourinary:         Urinary incontinence   Skin:  Positive for color change.   Neurological:  Positive for tremors. Negative for speech difficulty.   Psychiatric/Behavioral:  Positive for confusion.      Objective:     Vital Signs (Most Recent):  Temp: 98.9 °F (37.2 °C) (01/10/24 1516)  Pulse: 77 (01/10/24 1537)  Resp: 14 (01/10/24 1516)  BP: 127/64 (01/10/24 1516)  SpO2: 98 % (01/10/24 1516) Vital Signs (24h Range):  Temp:  [98.5 °F (36.9 °C)-99.3 °F (37.4 °C)] 98.9 °F (37.2 °C)  Pulse:  [] 77  Resp:  [14-18] 14  SpO2:  [93 %-99 %] 98 %  BP: ()/(52-64) 127/64     Body mass index is 22.69 kg/m².    Physical Exam  Vitals reviewed.   Constitutional:       General: She is not in acute distress.     Appearance: She is cachectic. She is ill-appearing.   HENT:      Head: Normocephalic and atraumatic.      Mouth/Throat:      Mouth: Mucous membranes are dry.      Pharynx: No oropharyngeal exudate.      Comments: Desquamation of lips  Eyes:      General: Scleral icterus present.      Extraocular Movements: Extraocular movements intact.   Cardiovascular:      Rate and Rhythm: Normal rate and regular rhythm.      Heart sounds: Normal heart sounds. No murmur heard.  Pulmonary:      Effort: Pulmonary effort is normal. No respiratory distress.      Breath sounds: No wheezing.   Abdominal:      General: Bowel sounds are normal. There is no distension.      Palpations: Abdomen is soft.       Tenderness: There is no abdominal tenderness. There is no guarding.   Musculoskeletal:         General: No tenderness. Normal range of motion.      Cervical back: Normal range of motion and neck supple.   Lymphadenopathy:      Cervical: No cervical adenopathy.   Skin:     General: Skin is warm and dry.      Capillary Refill: Capillary refill takes less than 2 seconds.      Coloration: Skin is jaundiced.      Findings: No rash.   Neurological:      Mental Status: She is alert. She is disoriented.      Motor: Weakness: bilateral lower extremities.      Comments: Poor participation   Psychiatric:         Attention and Perception: She perceives visual hallucinations.         Behavior: Behavior is cooperative.         Thought Content: Thought content is delusional.        Significant Labs: All pertinent labs within the past 24 hours have been reviewed.  Significant Imaging: I have reviewed all pertinent imaging results/findings within the past 24 hours.

## 2024-01-11 NOTE — ASSESSMENT & PLAN NOTE
Continue home regimen: lithium, olanzapine    Appreciate psychiatry recommendations we will decrease Zyprexa to 2.5mg.

## 2024-01-11 NOTE — PROGRESS NOTES
[facility] - Transplant Stepdown  Palliative Medicine  Progress Note    Patient Name: Marely Hamilton  MRN: [id number]  Admission Date: 1/4/2024  Hospital Length of Stay: 6 days  Code Status: DNR   Attending Provider: Lenka Ortiz MD  Consulting Provider: MIKA Ponce  Primary Care Physician: Viktor Ross MD  Principal Problem:Decompensated hepatic cirrhosis    Patient information was obtained from relative(s) and primary team.      Assessment/Plan:     Palliative Care  Palliative care encounter  Impression: Marely Hamilton is a 59 y/o female with multiple hospitalizations over the past year who presents from the NH in which she resides. Pt with history of alcoholic cirrhosis, bipolar disorder, chronic anemia, thrombocytopenia, cholelithiasis, DVT with IVC filter in place, prior GI bleeds, ESBL UTI history, seizure disorder. Per chart review pt has been declined for a liver Tx twice for frailty, non-compliance, an combordidities. This morning she is jaundiced and awake, oriented only to person. She does state that Zaria (sister) is who to contact to make decisions. She does not appear to be in any acute distress and does not show any s/s of pain or labored breathing. She denies pain.     Reason for Consult: Per communication with Dr. Chavez, MALI and ACP needed.     Advance Care Planning  Code Status  In light of the patients advanced and life limiting illness,I engaged the the family in a voluntary conversation about the patient's preferences for care  at the very end of life. The patient wishes to have a natural, peaceful death.  Along those lines, the patient does not wish to have CPR or other invasive treatments performed when her heart and/or breathing stops. I communicated to the family that a DNR order would be placed in her medical record to reflect this preference.  I spent a total of 25 minutes engaging the patient in this advance care planning discussion.        GOC:1/11/24: Spoke with Zaria  "today, she is planning on coming Sunday afternoon. Discussed current issues swallowing, negative seizures on EEG, psych consult to assure appropriate tx of mental health dz, and continued overall decline despite therapies/treatments. Understands that hospice is a very likely option. Sister reports that pt still has room available to her at [facility] if needed but continues to work on approval elsewhere.     1/9/24: Per my conversation/plan with sister ,Zaria, yesterday, I called her from the bedside this am with pt. present so that we could all be present for conversation. This morning pt is awake and oriented to person and intermittently oriented to time and place throughout our conversation. Zaria stressed to pt that she wanted to follow her wishes and asked pt if she was ok with DNR status. Pt replied "yes".   We discussed that pt continues to have intermittent periods of orientation, including during my time this morning. I reiterated to pt and sister that there are no options left for treatment of liver cirrhosis, which will continue to to manifest as progressive decline, leading to end of life. Dr. Chavez also spoke with sister yesterday explaining the same. Sister remains hopeful that pt will "pull thru again, like she has so many times before". Sister understands that pt is not able to move to [place] to live near her and is looking into other placement, as pt did not like [facility]. Sister is planning to visit this weekend to make further arrangements.     1/8/24: As pt was only oriented to self upon my visit this am, I called and spoke with sister Zaria ([phone]) who lives in [facility]. We discussed her advanced liver disease and lack of candidacy for transplant. Sister was surprised to hear this, and I have asked primary team to reach out for prognostication. I did explain liver disease and continued progression without transplant availability. Zaria did hope to get pt to [facility]. nearer to " her, but we discussed travel is unlikely at this time. Zaria shared that she wishes to fulfill her sisters wishes in any way possible and therefore agreed to DNR status, per conversation MD had with pt over the weekend. I notified Dr. Chavez of sisters request to place DNR. Zaria has been unable to talk to her sister and we arranged a time to make this phone call that works with her schedule tomorrow morning.     MPOA: Per chart review, pt has requested that Zaria be her decision maker if she could not. Zaria state that she has MPOA, copy to be placed in chart.     Symptoms   Pt denies pain.   No other symptoms noted at this time.     Recommendations  -Continue to monitor for s/s of pain/ discomfort  - Sister would benefit from primary care discussion of prognosis in person        I will follow-up with patient. Please contact us if you have any additional questions.    Subjective:     Chief Complaint:   Chief Complaint   Patient presents with    Ascites     From [facility] for abd distention, BLE swelling, and jaudice. Hx of cirrhosis.        HPI:   HPI: Per Chart Review: Patient is a 58-year-old woman with past medical history significant for alcoholic cirrhosis, bipolar disorder, chronic anemia, thrombocytopenia, cholelithiasis, DVT with IVC filter in place, prior GI bleeds, ESBL UTI history, seizure disorder presenting from nursing home [facility] for evaluation of abdominal distension, peripheral edema, and jaundice.      Patient denies fever, chills, or abdominal pain. Patient was recently admitted in early December for hepatic encephalopathy.   Patient denies any pain at this time. She reports she has been urinating frequently though difficult to obtain history.   She denies recent illness/fever/chills/nausea/vomiting/diarrhea/constipation.  Patient has not been taking lactulose. Reports compliance with psychiatric medications and A&Ox3 but reports she can't take the lactulose due to not  tolerating the sun.    Hospital Course:   In the ED, vitals stable but hypoxic on room air to the 80s, which improved with supplemental oxygen.  Presentation consistent with decompensated liver failure, meld score 22.    She was sent in by her nursing home facility for mild abdominal distention as well as significant jaundice.    Chest x-ray revealed a moderate-to-large pleural effusion with compressive atelectasis. Likely from her ascites. Her abdomen is soft, nontender.       Intake labs remarkable for Na 142, BUN 6, Cr 0.4, Calcium 7.8, , Tbili 12.1, , ALT 41, Ammonia 114, lactic 1.0.      Will admit for decompensated cirrhosis. Continue on lactulose for treatment of HE, rocephin for SBP ppx. Hepatology and IR consulted for thoracentesis and further recommendations.   Patient is oriented to person, place, time, and situation but with some delusions.        Hospital Course:  No notes on file      Medications:  Continuous Infusions:  Scheduled Meds:   amoxicillin-clavulanate  400 mg Oral Q12H    folic acid (FOLVITE) IVPB  1 mg Intravenous Daily    lactulose  20 g Oral TID    levETIRAcetam (Keppra) IV (PEDS and ADULTS)  1,000 mg Intravenous Q12H    lithium  300 mg Oral QHS    OLANZapine zydis  5 mg Oral QHS    pantoprazole  40 mg Intravenous Daily    rifAXIMin  550 mg Oral BID    thiamine (B-1) 100 mg in dextrose 5 % (D5W) 100 mL IVPB  100 mg Intravenous Daily     PRN Meds:ALPRAZolam, glucagon (human recombinant), glucose, glucose, insulin aspart U-100, ondansetron, prochlorperazine, sodium chloride 0.9%    Objective:     Vital Signs (Most Recent):  Temp: 98.3 °F (36.8 °C) (01/11/24 1622)  Pulse: 75 (01/11/24 1622)  Resp: 16 (01/11/24 1146)  BP: 131/62 (01/11/24 1622)  SpO2: 99 % (01/11/24 1622) Vital Signs (24h Range):  Temp:  [97.2 °F (36.2 °C)-98.4 °F (36.9 °C)] 98.3 °F (36.8 °C)  Pulse:  [64-80] 75  Resp:  [16-18] 16  SpO2:  [96 %-100 %] 99 %  BP: (104-131)/(51-72) 131/62     Weight: 58.1 kg (128  lb 1.4 oz)  Body mass index is 22.69 kg/m².       Physical Exam  Constitutional:       Appearance: She is ill-appearing.   Abdominal:      General: There is distension.   Skin:     Coloration: Skin is jaundiced.   Neurological:      Comments: Awake, oriented to person only.             Review of Symptoms      Symptom Assessment (ESAS 0-10 Scale)  Pain:  0  Dyspnea:  0  Anxiety:  0  Nausea:  0  Depression:  0  Anorexia:  0  Fatigue:  0  Insomnia:  0  Restlessness:  0  Agitation:  0 due to Acuity of condition         Pain Assessment in Advanced Demential Scale (PAINAD)   Breathing - Independent of vocalization:  0  Negative vocalization:  0  Facial expression:  0  Body language:  0  Consolability:  0  Total:  0    Performance Status:  50    Living Arrangements:  Lives in nursing home    Psychosocial/Cultural:   See Palliative Psychosocial Note: Yes  Lives in [facility] after ICU stay in 2023. Sister is decision maker who lives in [facility].   **Primary  to Follow**  Palliative Care  Consult: Yes        Advance Care Planning  Advance Directives:   Living Will: No    LaPOST: No    Do Not Resuscitate Status: Yes    Medical Power of : Documentation in chart states that pt wishes her sister Zaria to make decisions..      Decision Making:  Family answered questions and Patient unable to communicate due to disease severity/cognitive impairment  Goals of Care: What is most important right now is to focus on improvement in condition but with limits to invasive therapies. Accordingly, we have decided that the best plan to meet the patient's goals includes continuing with treatment.         Significant Labs: CBC:   Recent Labs   Lab 01/10/24  0557 01/11/24  0625   WBC 4.50 3.74*   HGB 8.0* 7.8*   HCT 25.9* 24.4*   PLT 47* 43*       CMP:   Recent Labs   Lab 01/10/24  0557 01/10/24  1947 01/11/24  0625   * 146* 149*   K 2.9* 2.9* 3.6   * 116* 122*   CO2 21* 24 22*   * 89  114*   BUN 11 10 10   CREATININE 0.5 0.4* 0.4*   CALCIUM 8.8 8.5* 8.5*   PROT 4.6*  --  4.4*   ALBUMIN 1.8*  --  1.9*   BILITOT 18.1*  --  17.2*   ALKPHOS 136*  --  130   AST 47*  --  45*   ALT 25  --  23   ANIONGAP 7* 6* 5*       CBC:   Recent Labs   Lab 01/11/24 0625   WBC 3.74*   HGB 7.8*   HCT 24.4*   *   PLT 43*       BMP:  Recent Labs   Lab 01/11/24 0625   *   *   K 3.6   *   CO2 22*   BUN 10   CREATININE 0.4*   CALCIUM 8.5*   MG 1.8       LFT:  Lab Results   Component Value Date    AST 45 (H) 01/11/2024    GGT 33 06/17/2023    ALKPHOS 130 01/11/2024    BILITOT 17.2 (H) 01/11/2024     Albumin:   Albumin   Date Value Ref Range Status   01/11/2024 1.9 (L) 3.5 - 5.2 g/dL Final     Protein:   Total Protein   Date Value Ref Range Status   01/11/2024 4.4 (L) 6.0 - 8.4 g/dL Final     Lactic acid:   Lab Results   Component Value Date    LACTATE 1.0 01/05/2024    LACTATE 2.3 (H) 11/29/2023       Significant Imaging: I have reviewed all pertinent imaging results/findings within the past 24 hours.    > 50% of  35 min visit spent in chart review, face to face discussion of goals of care, charting, symptom assessment, coordination of care and emotional support   Catalina Brunner, CNS  Palliative Medicine  [facility] - Transplant Stepdown

## 2024-01-12 LAB
ALBUMIN SERPL BCP-MCNC: 1.9 G/DL (ref 3.5–5.2)
ALP SERPL-CCNC: 140 U/L (ref 55–135)
ALT SERPL W/O P-5'-P-CCNC: 24 U/L (ref 10–44)
ANION GAP SERPL CALC-SCNC: 6 MMOL/L (ref 8–16)
AST SERPL-CCNC: 52 U/L (ref 10–40)
BACTERIA SPEC ANAEROBE CULT: NORMAL
BASOPHILS # BLD AUTO: 0.01 K/UL (ref 0–0.2)
BASOPHILS NFR BLD: 0.3 % (ref 0–1.9)
BILIRUB SERPL-MCNC: 18.3 MG/DL (ref 0.1–1)
BUN SERPL-MCNC: 9 MG/DL (ref 6–20)
CALCIUM SERPL-MCNC: 9.2 MG/DL (ref 8.7–10.5)
CHLORIDE SERPL-SCNC: 128 MMOL/L (ref 95–110)
CO2 SERPL-SCNC: 21 MMOL/L (ref 23–29)
COPPER SERPL-MCNC: 808 UG/L (ref 810–1990)
CREAT SERPL-MCNC: 0.4 MG/DL (ref 0.5–1.4)
DIFFERENTIAL METHOD BLD: ABNORMAL
EOSINOPHIL # BLD AUTO: 0.2 K/UL (ref 0–0.5)
EOSINOPHIL NFR BLD: 6 % (ref 0–8)
ERYTHROCYTE [DISTWIDTH] IN BLOOD BY AUTOMATED COUNT: 15.9 % (ref 11.5–14.5)
EST. GFR  (NO RACE VARIABLE): >60 ML/MIN/1.73 M^2
GLUCOSE SERPL-MCNC: 66 MG/DL (ref 70–110)
HCT VFR BLD AUTO: 28.6 % (ref 37–48.5)
HGB BLD-MCNC: 8.9 G/DL (ref 12–16)
IMM GRANULOCYTES # BLD AUTO: 0.01 K/UL (ref 0–0.04)
IMM GRANULOCYTES NFR BLD AUTO: 0.3 % (ref 0–0.5)
INR PPP: 1.8 (ref 0.8–1.2)
LYMPHOCYTES # BLD AUTO: 0.4 K/UL (ref 1–4.8)
LYMPHOCYTES NFR BLD: 10.1 % (ref 18–48)
MAGNESIUM SERPL-MCNC: 2 MG/DL (ref 1.6–2.6)
MCH RBC QN AUTO: 38.2 PG (ref 27–31)
MCHC RBC AUTO-ENTMCNC: 31.1 G/DL (ref 32–36)
MCV RBC AUTO: 123 FL (ref 82–98)
MONOCYTES # BLD AUTO: 0.3 K/UL (ref 0.3–1)
MONOCYTES NFR BLD: 8.5 % (ref 4–15)
NEUTROPHILS # BLD AUTO: 2.7 K/UL (ref 1.8–7.7)
NEUTROPHILS NFR BLD: 74.8 % (ref 38–73)
NRBC BLD-RTO: 0 /100 WBC
PHOSPHATE SERPL-MCNC: 2.2 MG/DL (ref 2.7–4.5)
PLATELET # BLD AUTO: 57 K/UL (ref 150–450)
PMV BLD AUTO: 9.6 FL (ref 9.2–12.9)
POTASSIUM SERPL-SCNC: 3.4 MMOL/L (ref 3.5–5.1)
PROT SERPL-MCNC: 4.7 G/DL (ref 6–8.4)
PROTHROMBIN TIME: 18.6 SEC (ref 9–12.5)
RBC # BLD AUTO: 2.33 M/UL (ref 4–5.4)
SODIUM SERPL-SCNC: 155 MMOL/L (ref 136–145)
WBC # BLD AUTO: 3.66 K/UL (ref 3.9–12.7)

## 2024-01-12 PROCEDURE — C9113 INJ PANTOPRAZOLE SODIUM, VIA: HCPCS | Performed by: HOSPITALIST

## 2024-01-12 PROCEDURE — S5010 5% DEXTROSE AND 0.45% SALINE: HCPCS | Performed by: HOSPITALIST

## 2024-01-12 PROCEDURE — 85025 COMPLETE CBC W/AUTO DIFF WBC: CPT | Performed by: STUDENT IN AN ORGANIZED HEALTH CARE EDUCATION/TRAINING PROGRAM

## 2024-01-12 PROCEDURE — 25000003 PHARM REV CODE 250: Performed by: STUDENT IN AN ORGANIZED HEALTH CARE EDUCATION/TRAINING PROGRAM

## 2024-01-12 PROCEDURE — 83735 ASSAY OF MAGNESIUM: CPT | Performed by: STUDENT IN AN ORGANIZED HEALTH CARE EDUCATION/TRAINING PROGRAM

## 2024-01-12 PROCEDURE — 94668 MNPJ CHEST WALL SBSQ: CPT

## 2024-01-12 PROCEDURE — 85610 PROTHROMBIN TIME: CPT | Performed by: STUDENT IN AN ORGANIZED HEALTH CARE EDUCATION/TRAINING PROGRAM

## 2024-01-12 PROCEDURE — 92526 ORAL FUNCTION THERAPY: CPT

## 2024-01-12 PROCEDURE — 84100 ASSAY OF PHOSPHORUS: CPT | Performed by: STUDENT IN AN ORGANIZED HEALTH CARE EDUCATION/TRAINING PROGRAM

## 2024-01-12 PROCEDURE — 80053 COMPREHEN METABOLIC PANEL: CPT | Performed by: STUDENT IN AN ORGANIZED HEALTH CARE EDUCATION/TRAINING PROGRAM

## 2024-01-12 PROCEDURE — 27000221 HC OXYGEN, UP TO 24 HOURS

## 2024-01-12 PROCEDURE — 25000003 PHARM REV CODE 250: Performed by: HOSPITALIST

## 2024-01-12 PROCEDURE — 36415 COLL VENOUS BLD VENIPUNCTURE: CPT | Performed by: STUDENT IN AN ORGANIZED HEALTH CARE EDUCATION/TRAINING PROGRAM

## 2024-01-12 PROCEDURE — 94761 N-INVAS EAR/PLS OXIMETRY MLT: CPT

## 2024-01-12 PROCEDURE — 63600175 PHARM REV CODE 636 W HCPCS: Performed by: HOSPITALIST

## 2024-01-12 PROCEDURE — 20600001 HC STEP DOWN PRIVATE ROOM

## 2024-01-12 RX ORDER — DEXTROSE MONOHYDRATE AND SODIUM CHLORIDE 5; .45 G/100ML; G/100ML
INJECTION, SOLUTION INTRAVENOUS CONTINUOUS
Status: DISCONTINUED | OUTPATIENT
Start: 2024-01-12 | End: 2024-01-14

## 2024-01-12 RX ORDER — AMOXICILLIN AND CLAVULANATE POTASSIUM 400; 57 MG/5ML; MG/5ML
875 POWDER, FOR SUSPENSION ORAL EVERY 12 HOURS
Status: DISCONTINUED | OUTPATIENT
Start: 2024-01-12 | End: 2024-01-17

## 2024-01-12 RX ORDER — ACETAMINOPHEN 650 MG/20.3ML
650 LIQUID ORAL EVERY 6 HOURS PRN
Status: DISCONTINUED | OUTPATIENT
Start: 2024-01-12 | End: 2024-01-18 | Stop reason: HOSPADM

## 2024-01-12 RX ADMIN — DEXTROSE MONOHYDRATE AND SODIUM CHLORIDE: 5; .45 INJECTION, SOLUTION INTRAVENOUS at 08:01

## 2024-01-12 RX ADMIN — LACTULOSE 20 G: 20 SOLUTION ORAL at 09:01

## 2024-01-12 RX ADMIN — LEVETIRACETAM 1000 MG: 100 INJECTION INTRAVENOUS at 09:01

## 2024-01-12 RX ADMIN — RIFAXIMIN 550 MG: 550 TABLET ORAL at 08:01

## 2024-01-12 RX ADMIN — LITHIUM CARBONATE 300 MG: 300 TABLET, FILM COATED, EXTENDED RELEASE ORAL at 09:01

## 2024-01-12 RX ADMIN — FOLIC ACID 1 MG: 5 INJECTION, SOLUTION INTRAMUSCULAR; INTRAVENOUS; SUBCUTANEOUS at 08:01

## 2024-01-12 RX ADMIN — THIAMINE HYDROCHLORIDE 100 MG: 100 INJECTION, SOLUTION INTRAMUSCULAR; INTRAVENOUS at 08:01

## 2024-01-12 RX ADMIN — RIFAXIMIN 550 MG: 550 TABLET ORAL at 09:01

## 2024-01-12 RX ADMIN — DEXTROSE MONOHYDRATE AND SODIUM CHLORIDE: 5; .45 INJECTION, SOLUTION INTRAVENOUS at 10:01

## 2024-01-12 RX ADMIN — LACTULOSE 20 G: 20 SOLUTION ORAL at 08:01

## 2024-01-12 RX ADMIN — OLANZAPINE 5 MG: 5 TABLET, ORALLY DISINTEGRATING ORAL at 09:01

## 2024-01-12 RX ADMIN — PANTOPRAZOLE SODIUM 40 MG: 40 INJECTION, POWDER, FOR SOLUTION INTRAVENOUS at 08:01

## 2024-01-12 RX ADMIN — AMOXICILLIN AND CLAVULANATE POTASSIUM 875.2 MG: 400; 57 POWDER, FOR SUSPENSION ORAL at 08:01

## 2024-01-12 RX ADMIN — LEVETIRACETAM 1000 MG: 100 INJECTION INTRAVENOUS at 08:01

## 2024-01-12 RX ADMIN — AMOXICILLIN AND CLAVULANATE POTASSIUM 875.2 MG: 400; 57 POWDER, FOR SUSPENSION ORAL at 09:01

## 2024-01-12 NOTE — PT/OT/SLP PROGRESS
Occupational Therapy      Patient Name:  Marely Hamilton   MRN:  [id number]    OT orders D/Cd as pt will be going on hospice upon discharge back to the NH and she is very lethargic / difficult to awaken and unable to participate in therapy.    BALBIR Munoz  1/12/2024

## 2024-01-12 NOTE — PLAN OF CARE
LCSW visited pt at bedside. Pt asleep, appearing comfortable, in no obvious distress. Pt's sister will be coming into town on Sunday, 1/14, to discuss GOC/ACP. LCSW WCTF.     Anisa Frederick, STEPHON  Palliative Medicine

## 2024-01-12 NOTE — PROGRESS NOTES
[facility]plant [facility] Medicine  Progress Note    Patient Name: Marely Hamilton  MRN: [id number]  Patient Class: IP- Inpatient   Admission Date: 1/4/2024  Length of Stay: 7 days  Attending Physician: Lenka Ortiz MD  Primary Care Provider: Viktor Ross MD        Subjective:     Principal Problem:Decompensated hepatic cirrhosis        HPI:  Patient is a 58-year-old woman with past medical history significant for alcoholic cirrhosis, bipolar disorder, chronic anemia, thrombocytopenia, cholelithiasis, DVT with IVC filter in place, prior GI bleeds, ESBL UTI history, seizure disorder presenting from [facility] for evaluation of abdominal distension, peripheral edema, and jaundice.     Patient denies fever, chills, or abdominal pain. Patient was recently admitted in early December for hepatic encephalopathy.   Patient denies any pain at this time. She reports she has been urinating frequently though difficult to obtain history.   She denies recent illness/fever/chills/nausea/vomiting/diarrhea/constipation.  Patient has not been taking lactulose. Reports compliance with psychiatric medications and A&Ox3 but reports she can't take the lactulose due to not tolerating the sun.    Hospital Course:   In the ED, vitals stable but hypoxic on room air to the 80s, which improved with supplemental oxygen.  Presentation consistent with decompensated liver failure, meld score 22.    She was sent in by her nursing home facility for mild abdominal distention as well as significant jaundice.    Chest x-ray revealed a moderate-to-large pleural effusion with compressive atelectasis. Likely from her ascites. Her abdomen is soft, nontender.      Intake labs remarkable for Na 142, BUN 6, Cr 0.4, Calcium 7.8, , Tbili 12.1, , ALT 41, Ammonia 114, lactic 1.0.     Will admit for decompensated cirrhosis. Continue on lactulose for treatment of HE, rocephin for SBP ppx. Hepatology and IR consulted for  thoracentesis and further recommendations.   Patient is oriented to person, place, time, and situation but with some delusions.     Overview/Hospital Course:  Palliative care has been helping coordinate with sister. She is now DNR/DNI.     Follow nutrition goals and recommendations however follow aspiration precautions.     Appreciate psychiatry recommendations. Continue lithium and and decreased zyprexa.     Will discuss with neurology for her Keppra.     Escalated antibiotic coverage from rocephin to zosyn due to aspiration overnight on 1/9/24. Diet changed to pureed.     Jose evaluating EEG to r/o seizures, asked if can wean keppra. Keppra level is pending.        Interval History:   1/12: patient appears to be clinically declining. Hypernatremia present.     Review of Systems   Unable to perform ROS: Mental status change   Constitutional:  Positive for appetite change. Negative for activity change and chills.   HENT:          Desquamation of lips   Respiratory:          Nasal canula in place   Gastrointestinal:         Incontinence of stools   Genitourinary:         Urinary incontinence   Skin:  Positive for color change.   Neurological:  Positive for tremors. Negative for speech difficulty.   Psychiatric/Behavioral:  Positive for confusion.      Objective:     Vital Signs (Most Recent):  Temp: 99.8 °F (37.7 °C) (01/12/24 1523)  Pulse: 80 (01/12/24 1523)  Resp: 16 (01/12/24 1523)  BP: (!) 111/59 (01/12/24 1523)  SpO2: 98 % (01/12/24 1523) Vital Signs (24h Range):  Temp:  [98 °F (36.7 °C)-99.8 °F (37.7 °C)] 99.8 °F (37.7 °C)  Pulse:  [70-84] 80  Resp:  [16-18] 16  SpO2:  [98 %-100 %] 98 %  BP: (111-130)/(56-67) 111/59     Weight: 58.1 kg (128 lb 1.4 oz)  Body mass index is 22.69 kg/m².    Intake/Output Summary (Last 24 hours) at 1/12/2024 1712  Last data filed at 1/12/2024 0616  Gross per 24 hour   Intake 440 ml   Output --   Net 440 ml         Physical Exam  Vitals reviewed.   Constitutional:       General: She  is not in acute distress.     Appearance: She is cachectic. She is ill-appearing.   HENT:      Head: Normocephalic and atraumatic.      Mouth/Throat:      Mouth: Mucous membranes are dry.      Pharynx: No oropharyngeal exudate.      Comments: Desquamation of lips  Eyes:      General: Scleral icterus present.      Extraocular Movements: Extraocular movements intact.   Cardiovascular:      Rate and Rhythm: Normal rate and regular rhythm.      Heart sounds: Normal heart sounds. No murmur heard.  Pulmonary:      Effort: Pulmonary effort is normal. No respiratory distress.      Breath sounds: No wheezing.   Abdominal:      General: Bowel sounds are normal. There is no distension.      Palpations: Abdomen is soft.      Tenderness: There is no abdominal tenderness. There is no guarding.   Musculoskeletal:         General: No tenderness. Normal range of motion.      Cervical back: Normal range of motion and neck supple.   Lymphadenopathy:      Cervical: No cervical adenopathy.   Skin:     General: Skin is warm and dry.      Capillary Refill: Capillary refill takes less than 2 seconds.      Coloration: Skin is jaundiced.      Findings: No rash.   Neurological:      Mental Status: She is alert. She is disoriented.      Motor: Weakness: bilateral lower extremities.      Comments: Poor participation   Psychiatric:         Attention and Perception: She perceives visual hallucinations.         Behavior: Behavior is cooperative.         Thought Content: Thought content is delusional.             Significant Labs: All pertinent labs within the past 24 hours have been reviewed.    Significant Imaging: I have reviewed all pertinent imaging results/findings within the past 24 hours.    Assessment/Plan:      * Decompensated hepatic cirrhosis  Patient with known Cirrhosis with Child's class C. Co-morbidities are present and inclusive of ascites, malnutrition, anemia/pancytopenia, and Pleural effusion .  MELD-Na score calculated; MELD  3.0: 32 at 1/10/2024  5:57 AM  MELD-Na: 27 at 1/10/2024  5:57 AM  Calculated from:  Serum Creatinine: 0.5 mg/dL (Using min of 1 mg/dL) at 1/10/2024  5:57 AM  Serum Sodium: 147 mmol/L (Using max of 137 mmol/L) at 1/10/2024  5:57 AM  Total Bilirubin: 18.1 mg/dL at 1/10/2024  5:57 AM  Serum Albumin: 1.8 g/dL at 1/10/2024  5:57 AM  INR(ratio): 2.4 at 1/10/2024  5:57 AM  Age at listing (hypothetical): 58 years  Sex: Female at 1/10/2024  5:57 AM      Continue chronic meds. Etiology likely ETOH. Will avoid any hepatotoxic meds, and monitor CBC/CMP/INR for synthetic function.     Presenting with decompensation (increased ascites, Bili 12, ammonia 114).  Status post thoracentesis of 650 mL.  Awaiting cultures.  Could not find a pocket for paracentesis    -restart lactulose as she has been noncompliant  Appreciate hepatology recommendations.      Recommendations to include physical therapy occupational therapy, palliative care discussions for goals of care.  She is not a candidate for transplantation given frailty, poor social support and noncompliance with medication    Aspiration of food  Event on 1/9.   SLP monitoring and diet changed from minced to pureed 1/10      Acute hypoxic respiratory failure  Patient with Hypoxic Respiratory failure which is Acute on chronic.  she is not on home oxygen. Supplemental oxygen was provided and noted improvement in her O2 sats.    -chest x-ray with right-sided pleural effusion.  Status post thoracentesis and 650 mL of fluid drained.  Awaiting cultures.  Oxygen requirements decreased to 2 L at this time.  Continue to monitor    Now with aspiration PNA. Conitnue zosyn   SLP eval and aspiration precautions.     Presence of IVC filter  Chronic IVC filter as she is at risk for bleeding and had history of DVTs      Goals of care, counseling/discussion  Appreciate palliative care facilitation with goals of care conversation.  Patient is now DNR and other family meeting scheduled for  tomorrow.    I also called and spoke to sister Zaria and she is on board and wants us to continue treating her for encephalopathy and infection and continue psychiatric medications.    Palliative care encounter  Appreciate palliative care recommendations and meeting with family that is her sister.  Family in agreement to make her DNR.  Documentation in place.  Code status updated    Another meeting with sister by palliative. More meetings needed, sister to come her pablo Saturday from [place]      Aspiration pneumonia of right lung  Aspiration even AM of 1/9  Evaluated by SLP, diet changed to minced and now pureed 1/10.   Started Zosyn 1/9        Acute metabolic encephalopathy  2/2 cirrhosis - non compliant with Lactulose, continue lactulose.   Polypharmacy - adjusted medications. Decreased Zyprexa.   SBP could not be ruled out as no pocket for ascites, now has aspiration and R sided on CXR 1/9, escalate Abx to zosyn     Poor nutrition. Added MVI. Ensure adequate nutrition   Severe debility     Seizure history, on keppra BID, asked neurology to weigh in and if EEG needed or if adjustment to keppra dose.     Palliative care facilitating family meetings. Sister to arrive from [place] on saturday          Hypernatremia  Patient has hypernatremia which is controlled. The hypernatremia is due to  Poor PO intake and Lasix . We will aim to correct the sodium by 8-10mEq in 24 hours. We will correct their hypernatremia with Select IV fluids: 5% albumin 12.5gms . The patient's sodium results have been reviewed and are listed below.  Recent Labs   Lab 01/10/24  0557   *     Will check Urine lytes    Bipolar I disorder, most recent episode depressed  Continue home regimen: lithium, olanzapine    Appreciate psychiatry recommendations we will decrease Zyprexa to 2.5mg.      Seizure  History of documented seizures since 2015.  Most recent encounter in June 2023 where she was on 1 g b.i.d. of Keppra.  Continued on Keppra 1 g  b.i.d..  Can consider checking an EEG ensure no seizures if she stays confused and verify with Neurology.  Pending Keppra level      Severe malnutrition  Nutrition consulted. Most recent weight and BMI monitored-     Measurements:  Wt Readings from Last 1 Encounters:   01/10/24 58.1 kg (128 lb 1.4 oz)   Body mass index is 22.69 kg/m².    Patient has been screened and assessed by RD.    Malnutrition Type:  Context: chronic illness  Level: severe    Malnutrition Characteristic Summary:  Subcutaneous Fat (Malnutrition):  (moderate to severe)  Muscle Mass (Malnutrition):  (moderate to severe)  Hand  Strength, Left (Malnutrition): reduced  Hand  Strength, Right (Malnutrition): reduced    Interventions/Recommendations (treatment strategy):  1.) Recommend continuing Low Na diet, fluid per MD. 2.) Recommend Boost Plus (or Boost equivalent) BID to help optimize PRO/Kcal intake (32% of the FR). 3.) Continue to provide folic acid and thiamine- consider adding MVI. RD to monitor PO intake, skin, labs, wt.        Macrocytic anemia  Patient's anemia is currently controlled. Has not received any PRBCs to date. Etiology likely d/t chronic disease due to Chronic liver disease  Current CBC reviewed-   Lab Results   Component Value Date    HGB 8.0 (L) 01/10/2024    HCT 25.9 (L) 01/10/2024     Monitor serial CBC and transfuse if patient becomes hemodynamically unstable, symptomatic or H/H drops below 7.    She is on chronic thiamine and folate.  We will add multivitamin    Ascites  Mild ascites seen on imaging however no pocket seen on attempt for paracentesis on 1/8/24        VTE Risk Mitigation (From admission, onward)           Ordered     Reason for No Pharmacological VTE Prophylaxis  Once        Question:  Reasons:  Answer:  Risk of Bleeding    01/05/24 0317     IP VTE HIGH RISK PATIENT  Once         01/05/24 0317     Place sequential compression device  Until discontinued         01/05/24 0317                    Discharge  Planning   BRAYAN: 1/16/2024     Code Status: DNR   Is the patient medically ready for discharge?: No    Reason for patient still in hospital (select all that apply): Patient trending condition  Discharge Plan A: Return to nursing home                  Lenka Ortiz MD  Department of Hospital Medicine   [facility] - Transplant Stepdown

## 2024-01-12 NOTE — PT/OT/SLP PROGRESS
"Speech Language Pathology Treatment    Patient Name:  Marely Hamilton   MRN:  [id number]  Admitting Diagnosis: Decompensated hepatic cirrhosis    Recommendations:                 General Recommendations:  Dysphagia therapy  Diet recommendations:  NPO, NPO   Aspiration Precautions:   Pleasure feedings of ice chips sparingly when awake/participating  Meds crushed in applesauce  General Precautions: Standard, aspiration, fall  Communication strategies:  none    Assessment:     Marely Hamilton is a 58 y.o. female with an SLP diagnosis of Dysphagia. She presents with minimal PO intake with oral trials and high risk for aspiration and aspiration-related complications.    Subjective     Spoke with nursing prior to session. Pt found resting in bed upon SLP entry into room.     Patient goals: "That's enough"     Pain/Comfort:  Pain Rating 1: 0/10    Respiratory Status: Room air    Objective:     Has the patient been evaluated by SLP for swallowing?   Yes  Keep patient NPO? Yes   Current Respiratory Status:        Pt seen for ongoing dysphagia therapy. Pt lethargic throughout session but able to sustain adequate levels of alertness for PO intake when given MIN verbal and tactile cueing. Trials of ice chips x3 tolerated without difficulty. 1/2 tsp trials of thin liquids and puree resulted in multiple swallows, lingual pumping, and weakened hyolaryngeal movement noted across all attempts. Upon attempts to continue oral intake, pt stating satiety and declining ongoing trials. SLP provided education regarding overall impressions, pleasure feedings of ice chips, continuation of NPO status, and ongoing SLP POC. Pt verbalized understanding but would continue to benefit from ongoing education.      Goals:   Multidisciplinary Problems       SLP Goals          Problem: SLP    Goal Priority Disciplines Outcome   SLP Goal     SLP Ongoing, Progressing   Description: Speech Language Pathology Goals  Goals expected to be met by 1/23    1. Pt " will tolerate least restrictive PO diet without any overt s/sx of airway compromise.                                Plan:     Patient to be seen:  3 x/week   Plan of Care expires:  02/08/24  Plan of Care reviewed with:  patient   SLP Follow-Up:  Yes       Discharge recommendations:   (TBD)   Barriers to Discharge:  Level of Skilled Assistance Needed      Time Tracking:     SLP Treatment Date:   01/12/24  Speech Start Time:  0915  Speech Stop Time:  0923     Speech Total Time (min):  8 min    Billable Minutes: Treatment Swallowing Dysfunction 8      01/12/2024

## 2024-01-12 NOTE — SUBJECTIVE & OBJECTIVE
Interval History:   1/12: patient appears to be clinically declining. Hypernatremia present.     Review of Systems   Unable to perform ROS: Mental status change   Constitutional:  Positive for appetite change. Negative for activity change and chills.   HENT:          Desquamation of lips   Respiratory:          Nasal canula in place   Gastrointestinal:         Incontinence of stools   Genitourinary:         Urinary incontinence   Skin:  Positive for color change.   Neurological:  Positive for tremors. Negative for speech difficulty.   Psychiatric/Behavioral:  Positive for confusion.      Objective:     Vital Signs (Most Recent):  Temp: 99.8 °F (37.7 °C) (01/12/24 1523)  Pulse: 80 (01/12/24 1523)  Resp: 16 (01/12/24 1523)  BP: (!) 111/59 (01/12/24 1523)  SpO2: 98 % (01/12/24 1523) Vital Signs (24h Range):  Temp:  [98 °F (36.7 °C)-99.8 °F (37.7 °C)] 99.8 °F (37.7 °C)  Pulse:  [70-84] 80  Resp:  [16-18] 16  SpO2:  [98 %-100 %] 98 %  BP: (111-130)/(56-67) 111/59     Weight: 58.1 kg (128 lb 1.4 oz)  Body mass index is 22.69 kg/m².    Intake/Output Summary (Last 24 hours) at 1/12/2024 1712  Last data filed at 1/12/2024 0616  Gross per 24 hour   Intake 440 ml   Output --   Net 440 ml         Physical Exam  Vitals reviewed.   Constitutional:       General: She is not in acute distress.     Appearance: She is cachectic. She is ill-appearing.   HENT:      Head: Normocephalic and atraumatic.      Mouth/Throat:      Mouth: Mucous membranes are dry.      Pharynx: No oropharyngeal exudate.      Comments: Desquamation of lips  Eyes:      General: Scleral icterus present.      Extraocular Movements: Extraocular movements intact.   Cardiovascular:      Rate and Rhythm: Normal rate and regular rhythm.      Heart sounds: Normal heart sounds. No murmur heard.  Pulmonary:      Effort: Pulmonary effort is normal. No respiratory distress.      Breath sounds: No wheezing.   Abdominal:      General: Bowel sounds are normal. There is no  distension.      Palpations: Abdomen is soft.      Tenderness: There is no abdominal tenderness. There is no guarding.   Musculoskeletal:         General: No tenderness. Normal range of motion.      Cervical back: Normal range of motion and neck supple.   Lymphadenopathy:      Cervical: No cervical adenopathy.   Skin:     General: Skin is warm and dry.      Capillary Refill: Capillary refill takes less than 2 seconds.      Coloration: Skin is jaundiced.      Findings: No rash.   Neurological:      Mental Status: She is alert. She is disoriented.      Motor: Weakness: bilateral lower extremities.      Comments: Poor participation   Psychiatric:         Attention and Perception: She perceives visual hallucinations.         Behavior: Behavior is cooperative.         Thought Content: Thought content is delusional.             Significant Labs: All pertinent labs within the past 24 hours have been reviewed.    Significant Imaging: I have reviewed all pertinent imaging results/findings within the past 24 hours.

## 2024-01-12 NOTE — CONSULTS
[facility] - Transplant Stepdown  Wound Care    Patient Name:  Marely Hamilton   MRN:  [id number]  Date: 1/11/2024  Diagnosis: Decompensated hepatic cirrhosis    History:     Past Medical History:   Diagnosis Date    Addiction to drug     Alcohol abuse     Alcohol abuse, in remission 6/15/2015    14.5 weeks ago; AA weekly    Anemia     Anxiety 6/15/2015    Behavioral problem     Bipolar disorder     Bipolar disorder in remission 6/15/2015    Cirrhosis, Laennec's 6/15/2015    Depression     Encounter for blood transfusion     Epistaxis 6/15/2015    Fatigue     Glaucoma     Hematuria     Hepatic encephalopathy 6/15/2015    Hepatic enlargement     History of psychiatric care     History of psychiatric hospitalization     History of seizure 6/15/2015    1    hx of intentional Tylenol overdose 6/15/2015    2005- situational and hx of bipolar    Hx of psychiatric care     Macrocytic anemia 9/18/2015    6 units PRBC since June 2015    Jeana     Osteoarthritis     Other ascites 6/15/2015    Psychiatric exam requested by authority     Psychiatric problem     Psychosis 9/26/2019    Renal disorder     Seizures     Self-harming behavior     Suicide attempt     Therapy     Thrombocytopenia 6/15/2015       Social History     Socioeconomic History    Marital status: Single   Occupational History    Occupation: Disabled     Employer: DISABLED   Tobacco Use    Smoking status: Never    Smokeless tobacco: Never   Substance and Sexual Activity    Alcohol use: Not Currently     Comment: hx of ETOH abuse with cirrhosis of liver    Drug use: No    Sexual activity: Not Currently   Other Topics Concern    Patient feels they ought to cut down on drinking/drug use No    Patient annoyed by others criticizing their drinking/drug use No    Patient has felt bad or guilty about drinking/drug use No    Patient has had a drink/used drugs as an eye opener in the AM No   Social History Narrative    Pt has 1 older and 1 younger sister.  Her father committed  suicide when she was 18.  She has a [employer] degree and worked as an , but she has been disabled since age 40.  She never  and has no children.  She is not dating and claims no current friends.  She currently lives with her mother along with her pet dog.  She denies any hobbies and says she is not Episcopalian.     Social Determinants of Health     Financial Resource Strain: Low Risk  (11/30/2023)    Overall Financial Resource Strain (CARDIA)     Difficulty of Paying Living Expenses: Not hard at all   Food Insecurity: No Food Insecurity (11/30/2023)    Hunger Vital Sign     Worried About Running Out of Food in the Last Year: Never true     Ran Out of Food in the Last Year: Never true   Transportation Needs: No Transportation Needs (11/30/2023)    PRAPARE - Transportation     Lack of Transportation (Medical): No     Lack of Transportation (Non-Medical): No   Physical Activity: Inactive (11/30/2023)    Exercise Vital Sign     Days of Exercise per Week: 0 days     Minutes of Exercise per Session: 0 min   Stress: Stress Concern Present (8/15/2023)    [employer] - Occupational Stress Questionnaire     Feeling of Stress : Very much   Social Connections: Socially Isolated (11/30/2023)    Social Connection and Isolation Panel [NHANES]     Frequency of Communication with Friends and Family: More than three times a week     Frequency of Social Gatherings with Friends and Family: Patient declined     Attends Taoism Services: Never     Active Member of Clubs or Organizations: No     Attends Club or Organization Meetings: Never     Marital Status: Never    Housing Stability: Low Risk  (11/30/2023)    Housing Stability Vital Sign     Unable to Pay for Housing in the Last Year: No     Number of Places Lived in the Last Year: 1     Unstable Housing in the Last Year: No       Precautions:     Allergies as of 01/04/2024 - Reviewed 11/30/2023   Allergen Reaction Noted    Sulfa (sulfonamide  antibiotics) Rash 11/26/2014    Codeine Nausea And Vomiting 04/26/2018       [facility] Assessment Details/Treatment     Patient seen for wound care consultation. -placed by RN for L buttock.    Reviewed chart for this encounter.   See Flow Sheet for findings.    Pt opens eyes to verbal stimuli; however non verbal during visit. Requires max assist to turn and maintain position needed for care. Buttocks remain clear- moisture barrier in place as prevention intervention. Pt incontinent of both urine and stool- clear at this time. B heels intact but with blanchable erythema.- border foam dressings replaced.     RECOMMENDATIONS:  -continue PIP interventions.    Discussed POC with patient and primary nurse.   See EMR for orders     IP skin integrity NIKKI consulted for increased risk of PI.  Bedside nursing to continue care & monitoring.  Bedside nursing to maintain pressure injury prevention interventions.- rec for waffle overlay.  Current documented Francisco score is 11 with a nutrition sub scale score of 2. IP nutrition following.     01/11/24 1730   WOCN Assessment   WOCN Total Time (mins) 30   Visit Date 01/11/24   Visit Time 1730   Consult Type New   WOCN Speciality Wound   Intervention assessed;applied;orders   Skin Interventions   Pressure Reduction Devices heel offloading device utilized;positioning supports utilized;specialty bed utilized  (pending waffle overlay.)   Pressure Reduction Techniques frequent weight shift encouraged;heels elevated off bed;positioned off wounds;pressure points protected   Skin Protection adhesive use limited   Positioning   Body Position turned   Head of Bed (HOB) Positioning HOB elevated   Positioning/Transfer Devices pillows   Pressure Injury Prevention    Check Moisture Management Pad Done   Sacral Foam Dressing Patient incontinent, barrier cream applied   Heel protection technique Pillow   Heel preventative measures Replace   Check Medical Devices Done     Photos taken for reference.   B  heels     sacrum    Will continue to follow as needed and/ or as directed until discharge.    Jing Richards BSN, RN, CWOCN  01/11/2024

## 2024-01-12 NOTE — TREATMENT PLAN
Hepatology Treatment Plan    Marely Hamilton is a 58 y.o. female admitted to hospital 1/4/2024 (Hospital Day: 9) due to Decompensated hepatic cirrhosis.     Interval History    Hypernatremia worsening  Poor PO intake  Palliative following. Sister plans on coming in this weekend      Objective  Temp:  [98 °F (36.7 °C)-98.8 °F (37.1 °C)] 98 °F (36.7 °C) (01/12 0738)  Pulse:  [67-84] 82 (01/12 0810)  BP: (114-131)/(58-67) 128/67 (01/12 0738)  Resp:  [16-18] 18 (01/12 0810)  SpO2:  [99 %-100 %] 100 % (01/12 0810)      Laboratory    Lab Results   Component Value Date    WBC 3.66 (L) 01/12/2024    HGB 8.9 (L) 01/12/2024    HCT 28.6 (L) 01/12/2024     (H) 01/12/2024    PLT 57 (L) 01/12/2024       Lab Results   Component Value Date     (H) 01/12/2024    K 3.4 (L) 01/12/2024     (HH) 01/12/2024    CO2 21 (L) 01/12/2024    BUN 9 01/12/2024    CREATININE 0.4 (L) 01/12/2024    CALCIUM 9.2 01/12/2024       Lab Results   Component Value Date    ALBUMIN 1.9 (L) 01/12/2024    ALT 24 01/12/2024    AST 52 (H) 01/12/2024    GGT 33 06/17/2023    ALKPHOS 140 (H) 01/12/2024    BILITOT 18.3 (H) 01/12/2024       Lab Results   Component Value Date    INR 1.8 (H) 01/12/2024    INR 2.0 (H) 01/11/2024    INR 2.4 (H) 01/10/2024       MELD 3.0: 29 at 1/12/2024  5:59 AM  MELD-Na: 24 at 1/12/2024  5:59 AM  Calculated from:  Serum Creatinine: 0.4 mg/dL (Using min of 1 mg/dL) at 1/12/2024  5:59 AM  Serum Sodium: 155 mmol/L (Using max of 137 mmol/L) at 1/12/2024  5:59 AM  Total Bilirubin: 18.3 mg/dL at 1/12/2024  5:59 AM  Serum Albumin: 1.9 g/dL at 1/12/2024  5:59 AM  INR(ratio): 1.8 at 1/12/2024  5:59 AM  Age at listing (hypothetical): 58 years  Sex: Female at 1/12/2024  5:59 AM      Assessment  Marely Hamilton is a 58 y.o. female with history of EtOH cirrhosis, bipolar disorder, DVT with IVC filter in place, prior GI bleeds, ESBL UTI history, and seizure disorder. She presented to the ER from her nursing home for abdominal  distension, peripheral edema, and jaundice. Found to have pleural effusion; thora performed. Has had multiple admissions in the past year for HE, UTI, falls, and GI bleeding (EGD on 7/6/23 showed esophageal ulcer, no varices, non-bleeding gastric ulcer and duodenal ulcer with oozing). Extremely frail and ill appearing on exam. Has been declined for a liver transplant twice - for frailty, non-compliance and comorbidities. S/P thoracentesis on 1/5/23 - 650cc fluid removed. Transudative. No plans for inpatient liver tx eval given frailty, social situation, and medical/psychiatric comorbidities.         Problem List:  Decompensated cirrhosis 2/2 EtOH use (c/b HE and ascites)  Hx of bleeding gastric and duodenal ulcers  EtOH use disorder  Bipolar disorder  Medication non-compliance    Plan  - Appreciate palliative care involvement  - Replete potassium as hypokalemia can worsen hepatic encephalopathy  - Replete phos  - Start spironolactone 50 mg daily  - Reduce lactulose dosing to avoid worsening hypernatremia. Consider c diff testing if still having diarrhea despite holding lactulose.  - D5 for correction of hypernatremia per primary team  - Continue home psychiatric medications per psych recs  - Avoid sedating drugs like opiates and BZDs, if possible.   - Low Na, high protein diet.   - For frailty: Continue PT, OT, and Nutrition.    - please obtain daily CBC, BMP, LFT, INR  - Plan of care was discussed with primary team    Thank you for involving us in the care of Marely Hamilton. Please call with any additional concerns or questions.    Roberto Faith  Gastroenterology and Hepatology Fellow, PGY-V

## 2024-01-12 NOTE — PROGRESS NOTES
Pharmacist Renal Dose Adjustment Note    Marely Hamilton is a 58 y.o. female being treated with the medication Augmentin    Patient Data:    Vital Signs (Most Recent):  Temp: 98 °F (36.7 °C) (01/12/24 0738)  Pulse: 81 (01/12/24 0738)  Resp: 18 (01/12/24 0738)  BP: 128/67 (01/12/24 0738)  SpO2: 100 % (01/12/24 0738) Vital Signs (72h Range):  Temp:  [97.2 °F (36.2 °C)-99.7 °F (37.6 °C)]   Pulse:  []   Resp:  [14-18]   BP: ()/(51-72)   SpO2:  [93 %-100 %]      Recent Labs   Lab 01/10/24  0557 01/10/24  1947 01/11/24  0625   CREATININE 0.5 0.4* 0.4*     Serum creatinine: 0.4 mg/dL (L) 01/11/24 0625  Estimated creatinine clearance: 128.4 mL/min (A)    Augmentin changed to 875/125 mg q12h    Alfredito Barrera Pharm.D., BCPS  [id number]

## 2024-01-12 NOTE — PLAN OF CARE
Pt has call bell in reach, non slip socks on, and bedrails up x2.  Pt encouraged to wash hands.  She is on iv antibiotics, zosyn.  I am checking her neuro function q4 hrs.  The bed alarm is activated.  Pt is incontinent. I am checking her throughout the shift.  Pt meds are being crushed and put in pudding. The liquid meds are given through the straw.   She is on tele monitoring.  She is a total assist ambulating.  Barrier cream being applied to buttocks

## 2024-01-13 LAB
ALBUMIN SERPL BCP-MCNC: 1.7 G/DL (ref 3.5–5.2)
ALP SERPL-CCNC: 152 U/L (ref 55–135)
ALT SERPL W/O P-5'-P-CCNC: 22 U/L (ref 10–44)
ANION GAP SERPL CALC-SCNC: 5 MMOL/L (ref 8–16)
AST SERPL-CCNC: 46 U/L (ref 10–40)
BASOPHILS # BLD AUTO: 0.01 K/UL (ref 0–0.2)
BASOPHILS NFR BLD: 0.3 % (ref 0–1.9)
BILIRUB SERPL-MCNC: 17.8 MG/DL (ref 0.1–1)
BUN SERPL-MCNC: 7 MG/DL (ref 6–20)
CALCIUM SERPL-MCNC: 8.2 MG/DL (ref 8.7–10.5)
CHLORIDE SERPL-SCNC: 125 MMOL/L (ref 95–110)
CO2 SERPL-SCNC: 19 MMOL/L (ref 23–29)
CREAT SERPL-MCNC: 0.4 MG/DL (ref 0.5–1.4)
DIFFERENTIAL METHOD BLD: ABNORMAL
EOSINOPHIL # BLD AUTO: 0.2 K/UL (ref 0–0.5)
EOSINOPHIL NFR BLD: 5.1 % (ref 0–8)
ERYTHROCYTE [DISTWIDTH] IN BLOOD BY AUTOMATED COUNT: 15.8 % (ref 11.5–14.5)
EST. GFR  (NO RACE VARIABLE): >60 ML/MIN/1.73 M^2
GLUCOSE SERPL-MCNC: 234 MG/DL (ref 70–110)
HCT VFR BLD AUTO: 27.5 % (ref 37–48.5)
HGB BLD-MCNC: 8.4 G/DL (ref 12–16)
IMM GRANULOCYTES # BLD AUTO: 0.06 K/UL (ref 0–0.04)
IMM GRANULOCYTES NFR BLD AUTO: 1.7 % (ref 0–0.5)
INR PPP: 1.9 (ref 0.8–1.2)
LYMPHOCYTES # BLD AUTO: 0.3 K/UL (ref 1–4.8)
LYMPHOCYTES NFR BLD: 8.2 % (ref 18–48)
MAGNESIUM SERPL-MCNC: 1.8 MG/DL (ref 1.6–2.6)
MCH RBC QN AUTO: 38.7 PG (ref 27–31)
MCHC RBC AUTO-ENTMCNC: 30.5 G/DL (ref 32–36)
MCV RBC AUTO: 127 FL (ref 82–98)
MONOCYTES # BLD AUTO: 0.4 K/UL (ref 0.3–1)
MONOCYTES NFR BLD: 9.9 % (ref 4–15)
NEUTROPHILS # BLD AUTO: 2.7 K/UL (ref 1.8–7.7)
NEUTROPHILS NFR BLD: 74.8 % (ref 38–73)
NRBC BLD-RTO: 0 /100 WBC
PHOSPHATE SERPL-MCNC: 1.6 MG/DL (ref 2.7–4.5)
PLATELET # BLD AUTO: 55 K/UL (ref 150–450)
PMV BLD AUTO: 10 FL (ref 9.2–12.9)
POTASSIUM SERPL-SCNC: 2.9 MMOL/L (ref 3.5–5.1)
PROT SERPL-MCNC: 4.4 G/DL (ref 6–8.4)
PROTHROMBIN TIME: 19.3 SEC (ref 9–12.5)
RBC # BLD AUTO: 2.17 M/UL (ref 4–5.4)
SODIUM SERPL-SCNC: 149 MMOL/L (ref 136–145)
WBC # BLD AUTO: 3.54 K/UL (ref 3.9–12.7)

## 2024-01-13 PROCEDURE — 27000221 HC OXYGEN, UP TO 24 HOURS

## 2024-01-13 PROCEDURE — 85025 COMPLETE CBC W/AUTO DIFF WBC: CPT | Performed by: STUDENT IN AN ORGANIZED HEALTH CARE EDUCATION/TRAINING PROGRAM

## 2024-01-13 PROCEDURE — 84100 ASSAY OF PHOSPHORUS: CPT | Performed by: STUDENT IN AN ORGANIZED HEALTH CARE EDUCATION/TRAINING PROGRAM

## 2024-01-13 PROCEDURE — 36415 COLL VENOUS BLD VENIPUNCTURE: CPT | Performed by: STUDENT IN AN ORGANIZED HEALTH CARE EDUCATION/TRAINING PROGRAM

## 2024-01-13 PROCEDURE — 63600175 PHARM REV CODE 636 W HCPCS: Performed by: HOSPITALIST

## 2024-01-13 PROCEDURE — 99900035 HC TECH TIME PER 15 MIN (STAT)

## 2024-01-13 PROCEDURE — C9113 INJ PANTOPRAZOLE SODIUM, VIA: HCPCS | Performed by: HOSPITALIST

## 2024-01-13 PROCEDURE — 94668 MNPJ CHEST WALL SBSQ: CPT

## 2024-01-13 PROCEDURE — 85610 PROTHROMBIN TIME: CPT | Performed by: STUDENT IN AN ORGANIZED HEALTH CARE EDUCATION/TRAINING PROGRAM

## 2024-01-13 PROCEDURE — 20600001 HC STEP DOWN PRIVATE ROOM

## 2024-01-13 PROCEDURE — 25000003 PHARM REV CODE 250: Performed by: STUDENT IN AN ORGANIZED HEALTH CARE EDUCATION/TRAINING PROGRAM

## 2024-01-13 PROCEDURE — 83735 ASSAY OF MAGNESIUM: CPT | Performed by: STUDENT IN AN ORGANIZED HEALTH CARE EDUCATION/TRAINING PROGRAM

## 2024-01-13 PROCEDURE — S5010 5% DEXTROSE AND 0.45% SALINE: HCPCS | Performed by: HOSPITALIST

## 2024-01-13 PROCEDURE — 25000003 PHARM REV CODE 250: Performed by: HOSPITALIST

## 2024-01-13 PROCEDURE — 80053 COMPREHEN METABOLIC PANEL: CPT | Performed by: STUDENT IN AN ORGANIZED HEALTH CARE EDUCATION/TRAINING PROGRAM

## 2024-01-13 PROCEDURE — 94761 N-INVAS EAR/PLS OXIMETRY MLT: CPT

## 2024-01-13 RX ADMIN — RIFAXIMIN 550 MG: 550 TABLET ORAL at 08:01

## 2024-01-13 RX ADMIN — DEXTROSE MONOHYDRATE AND SODIUM CHLORIDE: 5; .45 INJECTION, SOLUTION INTRAVENOUS at 08:01

## 2024-01-13 RX ADMIN — PANTOPRAZOLE SODIUM 40 MG: 40 INJECTION, POWDER, FOR SOLUTION INTRAVENOUS at 08:01

## 2024-01-13 RX ADMIN — DEXTROSE MONOHYDRATE AND SODIUM CHLORIDE: 5; .45 INJECTION, SOLUTION INTRAVENOUS at 04:01

## 2024-01-13 RX ADMIN — AMOXICILLIN AND CLAVULANATE POTASSIUM 875.2 MG: 400; 57 POWDER, FOR SUSPENSION ORAL at 08:01

## 2024-01-13 RX ADMIN — AMOXICILLIN AND CLAVULANATE POTASSIUM 875.2 MG: 400; 57 POWDER, FOR SUSPENSION ORAL at 09:01

## 2024-01-13 RX ADMIN — THIAMINE HYDROCHLORIDE 100 MG: 100 INJECTION, SOLUTION INTRAMUSCULAR; INTRAVENOUS at 09:01

## 2024-01-13 RX ADMIN — FOLIC ACID 1 MG: 5 INJECTION, SOLUTION INTRAMUSCULAR; INTRAVENOUS; SUBCUTANEOUS at 08:01

## 2024-01-13 RX ADMIN — OLANZAPINE 5 MG: 5 TABLET, ORALLY DISINTEGRATING ORAL at 09:01

## 2024-01-13 RX ADMIN — LITHIUM CARBONATE 300 MG: 300 TABLET, FILM COATED, EXTENDED RELEASE ORAL at 09:01

## 2024-01-13 RX ADMIN — LEVETIRACETAM 1000 MG: 100 INJECTION INTRAVENOUS at 09:01

## 2024-01-13 RX ADMIN — RIFAXIMIN 550 MG: 550 TABLET ORAL at 09:01

## 2024-01-13 RX ADMIN — LEVETIRACETAM 1000 MG: 100 INJECTION INTRAVENOUS at 08:01

## 2024-01-13 RX ADMIN — DEXTROSE MONOHYDRATE AND SODIUM CHLORIDE: 5; .45 INJECTION, SOLUTION INTRAVENOUS at 12:01

## 2024-01-13 NOTE — PLAN OF CARE
VSS. Orientation waxes and wanes. Repositioning and weight shifting assistance provided. Purewick in place. Fall precautions maintained, bed alarm set. D5 1/2 infusing 125 mL/hr. On 2L O2 via nasal cannula.

## 2024-01-13 NOTE — SUBJECTIVE & OBJECTIVE
Interval History:   1/13: Clinically worsening. Continue supportive management    Review of Systems   Unable to perform ROS: Mental status change   Constitutional:  Positive for appetite change. Negative for activity change and chills.   HENT:          Desquamation of lips   Respiratory:          Nasal canula in place   Gastrointestinal:         Incontinence of stools   Genitourinary:         Urinary incontinence   Skin:  Positive for color change.   Neurological:  Positive for tremors. Negative for speech difficulty.   Psychiatric/Behavioral:  Positive for confusion.      Objective:     Vital Signs (Most Recent):  Temp: 98.6 °F (37 °C) (01/13/24 0727)  Pulse: 71 (01/13/24 0727)  Resp: 20 (01/13/24 0727)  BP: (!) 149/74 (01/13/24 0727)  SpO2: (!) 93 % (01/13/24 0921) Vital Signs (24h Range):  Temp:  [98 °F (36.7 °C)-99.8 °F (37.7 °C)] 98.6 °F (37 °C)  Pulse:  [61-83] 71  Resp:  [16-20] 20  SpO2:  [93 %-100 %] 93 %  BP: (111-149)/(56-74) 149/74     Weight: 58.1 kg (128 lb 1.4 oz)  Body mass index is 22.69 kg/m².    Intake/Output Summary (Last 24 hours) at 1/13/2024 1038  Last data filed at 1/13/2024 1015  Gross per 24 hour   Intake 180 ml   Output 450 ml   Net -270 ml         Physical Exam  Vitals reviewed.   Constitutional:       General: She is not in acute distress.     Appearance: She is cachectic. She is ill-appearing.   HENT:      Head: Normocephalic and atraumatic.      Mouth/Throat:      Mouth: Mucous membranes are dry.      Pharynx: No oropharyngeal exudate.      Comments: Desquamation of lips  Eyes:      General: Scleral icterus present.      Extraocular Movements: Extraocular movements intact.   Cardiovascular:      Rate and Rhythm: Normal rate and regular rhythm.      Heart sounds: Normal heart sounds. No murmur heard.  Pulmonary:      Effort: Pulmonary effort is normal. No respiratory distress.      Breath sounds: No wheezing.   Abdominal:      General: Bowel sounds are normal. There is no distension.       Palpations: Abdomen is soft.      Tenderness: There is no abdominal tenderness. There is no guarding.   Musculoskeletal:         General: No tenderness. Normal range of motion.      Cervical back: Normal range of motion and neck supple.   Lymphadenopathy:      Cervical: No cervical adenopathy.   Skin:     General: Skin is warm and dry.      Capillary Refill: Capillary refill takes less than 2 seconds.      Coloration: Skin is jaundiced.      Findings: No rash.   Neurological:      Mental Status: She is alert. She is disoriented.      Motor: Weakness: bilateral lower extremities.      Comments: Poor participation   Psychiatric:         Attention and Perception: She perceives visual hallucinations.         Behavior: Behavior is cooperative.         Thought Content: Thought content is delusional.             Significant Labs: All pertinent labs within the past 24 hours have been reviewed.    Significant Imaging: I have reviewed all pertinent imaging results/findings within the past 24 hours.

## 2024-01-13 NOTE — PHYSICIAN QUERY
PT Name: Marely Hamilton  MR #: [id number]     DOCUMENTATION CLARIFICATION     CDS: Denny LEHMAN,RN        Contact information: [email or web address]  This form is a permanent document in the medical record.     Query Date: January 12, 2024    By submitting this query, we are merely seeking further clarification of documentation.  Please utilize your independent clinical judgment when addressing the question(s) below.  The Medical Record contains the following   Indicators   Supporting Clinical Findings Location in Medical Record   x Respiratory failure Acute hypoxic respiratory failure  Patient with Hypoxic Respiratory failure which is Acute on chronic.  she is not on home oxygen. Supplemental oxygen was provided and noted improvement in her O2 sats.         1/9  HM PN   x Subjective Respiratory Signs/Symptoms: SOB, GARZA, Cough, etc.  Positive for shortness of breath     Pt taking meds with pudding and had bout of coughing /choking aspiration suspected   1/5 HM H&P     1/8 HM Significant Event    Objective Respiratory Signs/Symptoms: Respiratory distress, Accessory muscle use, tachypnea, wheezing, etc.     x RR         O2 sat         O2 use  Vital Signs ( 24 h Range):  Resp: 14---->18  SpO2 88%---->99%    Oxygen Use:   She has had increasd O2 requirement over the night from 1L to 5L     1/9 HM PN          1/8 HM Significant Event      x Blood Gas (ABG or VBG) Sample: Arterial   PH:      7.435  PCO2: 32.2  PO2: 77  HCO3: 21.6  Sat O2: 96  Flow 4  Nasal Cannula       1/9 Blood Gas lab   x Hypoxia/Hypercapnia In the ED, vitals stable but hypoxic on room air to the 80s, which improved with supplemental oxygen.  1/9 HM PN    BiPAP/Intubation/Mechanical Ventilation      Home O2, Oxygen Dependence     x Radiology findings   Worsening patchy consolidative and airspace opacities in the right mid and lower lung zone.  Possible small pleural effusion on the right.  Minimal patchy ground-glass opacities in the left lung.   No large left pleural effusion.     Impression:   Position limited study.   New/worse right greater than left pulmonary opacities suggestive of aspiration given reported clinical concern for aspiration.  Small right pleural effusion is also suspected.        1/9 Chest XR   x Acute/Chronic Illness Aspiration pneumonia of right lung, acute metabolic encephalopathy , seizure, severe malnutrition, ascites,   Decompensated hepatic cirrhosis 1/9 HM PN   x Treatment chest x-ray with right-sided pleural effusion. Status post thoracentesis and 650 mL of fluid drained.   Oxygen requirements decreased to 2 L at this time  1/12 HM PN    Other         The clinical guidelines noted are only a system guideline. It does not replace the providers clinical judgment.      [facility] Approved Diagnostic Criteria      Acute Respiratory Failure    Hypoxic: ABG pO2<60 mmHg or O2 sat of <91% on RA   AND/OR   Hypercapnic: ABG pCO2>50 mmHg with pH <7.35   AND   Respiratory symptoms documented (Subjective: SOB; Objective: Tachypnea, respiratory distress, increased work of breathing, unable to speak in complete sentences, labored breathing, use of accessory muscles, RR>26, cyanosis, dyspnea, wheezing, stridor, lethargy)      Chronic Respiratory Failure   Hypoxic: Continuous home oxygen    AND/OR   Hypercapnic: Normal pH with high CO2 (ex. COPD)      Acute on Chronic Respiratory Failure   Hypoxic: ABG pO2>10mmHg below baseline OR ABG pO2<60 mmHg OR SpO2<91% on usual home O2  OR O2>2L/min over baseline home O2   AND/OR   Hypercapnic: ABG pCO2>50 mmHg OR pCO2>10mmHg over baseline and pH <7.35   AND   Respiratory symptoms documented      Acute Respiratory Distress Syndrome (ARDS) - an acute, diffuse, inflammatory form of lung injury. Suspect with progressive dyspnea, hypoxemic respiratory failure, and bilateral alveolar infiltrates on chest imaging within 6 to 72 hours of an inciting event.   Acute Respiratory Distress - Generally describes  less severe respiratory symptoms (tachypnea, in respiratory distress, increased work of breathing, unable to speak in complete sentences, labored breathing, use of accessory muscles, RR> 24, cyanosis, dyspnea, wheezing, stridor, lethargy) without sufficient measurements (pO2, SpO2, pH, and pCO2) to meet criteria for respiratory failure)   Acute Respiratory Insufficiency - Generally describes less severe respiratory symptoms and measurements (pO2, SpO2, pH, and pCO2) not meeting criteria for respiratory failure         Provider, please clarify  the respiratory condition   associated with above clinical findings.   [  x  ] Acute Respiratory Failure with Hypoxia   [    ] No Respiratory Failure, hypoxia only    [    ] Other Respiratory Diagnosis (please specify): _________________     Please document in your progress notes daily for the duration of treatment until resolved and include in your discharge summary.     Form No. [id number]

## 2024-01-13 NOTE — PROGRESS NOTES
[facility] Medicine  Progress Note    Patient Name: Marely Hamilton  MRN: [id number]  Patient Class: IP- Inpatient   Admission Date: 1/4/2024  Length of Stay: 8 days  Attending Physician: Lenka Ortiz MD  Primary Care Provider: Viktor Ross MD        Subjective:     Principal Problem:Decompensated hepatic cirrhosis        HPI:  Patient is a 58-year-old woman with past medical history significant for alcoholic cirrhosis, bipolar disorder, chronic anemia, thrombocytopenia, cholelithiasis, DVT with IVC filter in place, prior GI bleeds, ESBL UTI history, seizure disorder presenting from [facility] for evaluation of abdominal distension, peripheral edema, and jaundice.     Patient denies fever, chills, or abdominal pain. Patient was recently admitted in early December for hepatic encephalopathy.   Patient denies any pain at this time. She reports she has been urinating frequently though difficult to obtain history.   She denies recent illness/fever/chills/nausea/vomiting/diarrhea/constipation.  Patient has not been taking lactulose. Reports compliance with psychiatric medications and A&Ox3 but reports she can't take the lactulose due to not tolerating the sun.    Hospital Course:   In the ED, vitals stable but hypoxic on room air to the 80s, which improved with supplemental oxygen.  Presentation consistent with decompensated liver failure, meld score 22.    She was sent in by her nursing home facility for mild abdominal distention as well as significant jaundice.    Chest x-ray revealed a moderate-to-large pleural effusion with compressive atelectasis. Likely from her ascites. Her abdomen is soft, nontender.      Intake labs remarkable for Na 142, BUN 6, Cr 0.4, Calcium 7.8, , Tbili 12.1, , ALT 41, Ammonia 114, lactic 1.0.     Will admit for decompensated cirrhosis. Continue on lactulose for treatment of HE, rocephin for SBP ppx. Hepatology and IR consulted for  thoracentesis and further recommendations.   Patient is oriented to person, place, time, and situation but with some delusions.     Overview/Hospital Course:  Palliative care has been helping coordinate with sister. She is now DNR/DNI.     Follow nutrition goals and recommendations however follow aspiration precautions.     Appreciate psychiatry recommendations. Continue lithium and and decreased zyprexa.     Will discuss with neurology for her Keppra.     Escalated antibiotic coverage from rocephin to zosyn due to aspiration overnight on 1/9/24. Diet changed to pureed.     Jose evaluating EEG to r/o seizures, asked if can wean keppra. Keppra level is pending.        Interval History:   1/13: Clinically worsening. Continue supportive management    Review of Systems   Unable to perform ROS: Mental status change   Constitutional:  Positive for appetite change. Negative for activity change and chills.   HENT:          Desquamation of lips   Respiratory:          Nasal canula in place   Gastrointestinal:         Incontinence of stools   Genitourinary:         Urinary incontinence   Skin:  Positive for color change.   Neurological:  Positive for tremors. Negative for speech difficulty.   Psychiatric/Behavioral:  Positive for confusion.      Objective:     Vital Signs (Most Recent):  Temp: 98.6 °F (37 °C) (01/13/24 0727)  Pulse: 71 (01/13/24 0727)  Resp: 20 (01/13/24 0727)  BP: (!) 149/74 (01/13/24 0727)  SpO2: (!) 93 % (01/13/24 0921) Vital Signs (24h Range):  Temp:  [98 °F (36.7 °C)-99.8 °F (37.7 °C)] 98.6 °F (37 °C)  Pulse:  [61-83] 71  Resp:  [16-20] 20  SpO2:  [93 %-100 %] 93 %  BP: (111-149)/(56-74) 149/74     Weight: 58.1 kg (128 lb 1.4 oz)  Body mass index is 22.69 kg/m².    Intake/Output Summary (Last 24 hours) at 1/13/2024 1038  Last data filed at 1/13/2024 1015  Gross per 24 hour   Intake 180 ml   Output 450 ml   Net -270 ml         Physical Exam  Vitals reviewed.   Constitutional:       General: She is not in  acute distress.     Appearance: She is cachectic. She is ill-appearing.   HENT:      Head: Normocephalic and atraumatic.      Mouth/Throat:      Mouth: Mucous membranes are dry.      Pharynx: No oropharyngeal exudate.      Comments: Desquamation of lips  Eyes:      General: Scleral icterus present.      Extraocular Movements: Extraocular movements intact.   Cardiovascular:      Rate and Rhythm: Normal rate and regular rhythm.      Heart sounds: Normal heart sounds. No murmur heard.  Pulmonary:      Effort: Pulmonary effort is normal. No respiratory distress.      Breath sounds: No wheezing.   Abdominal:      General: Bowel sounds are normal. There is no distension.      Palpations: Abdomen is soft.      Tenderness: There is no abdominal tenderness. There is no guarding.   Musculoskeletal:         General: No tenderness. Normal range of motion.      Cervical back: Normal range of motion and neck supple.   Lymphadenopathy:      Cervical: No cervical adenopathy.   Skin:     General: Skin is warm and dry.      Capillary Refill: Capillary refill takes less than 2 seconds.      Coloration: Skin is jaundiced.      Findings: No rash.   Neurological:      Mental Status: She is alert. She is disoriented.      Motor: Weakness: bilateral lower extremities.      Comments: Poor participation   Psychiatric:         Attention and Perception: She perceives visual hallucinations.         Behavior: Behavior is cooperative.         Thought Content: Thought content is delusional.             Significant Labs: All pertinent labs within the past 24 hours have been reviewed.    Significant Imaging: I have reviewed all pertinent imaging results/findings within the past 24 hours.    Assessment/Plan:      * Decompensated hepatic cirrhosis  Patient with known Cirrhosis with Child's class C. Co-morbidities are present and inclusive of ascites, malnutrition, anemia/pancytopenia, and Pleural effusion .  MELD-Na score calculated; MELD 3.0: 32 at  1/10/2024  5:57 AM  MELD-Na: 27 at 1/10/2024  5:57 AM  Calculated from:  Serum Creatinine: 0.5 mg/dL (Using min of 1 mg/dL) at 1/10/2024  5:57 AM  Serum Sodium: 147 mmol/L (Using max of 137 mmol/L) at 1/10/2024  5:57 AM  Total Bilirubin: 18.1 mg/dL at 1/10/2024  5:57 AM  Serum Albumin: 1.8 g/dL at 1/10/2024  5:57 AM  INR(ratio): 2.4 at 1/10/2024  5:57 AM  Age at listing (hypothetical): 58 years  Sex: Female at 1/10/2024  5:57 AM      Continue chronic meds. Etiology likely ETOH. Will avoid any hepatotoxic meds, and monitor CBC/CMP/INR for synthetic function.     Presenting with decompensation (increased ascites, Bili 12, ammonia 114).  Status post thoracentesis of 650 mL.  Awaiting cultures.  Could not find a pocket for paracentesis    -restart lactulose as she has been noncompliant  Appreciate hepatology recommendations.      Recommendations to include physical therapy occupational therapy, palliative care discussions for goals of care.  She is not a candidate for transplantation given frailty, poor social support and noncompliance with medication    Aspiration of food  Event on 1/9.   SLP monitoring and diet changed from minced to pureed 1/10      Acute hypoxic respiratory failure  Patient with Hypoxic Respiratory failure which is Acute on chronic.  she is not on home oxygen. Supplemental oxygen was provided and noted improvement in her O2 sats.    -chest x-ray with right-sided pleural effusion.  Status post thoracentesis and 650 mL of fluid drained.  Awaiting cultures.  Oxygen requirements decreased to 2 L at this time.  Continue to monitor    Now with aspiration PNA. Conitnue zosyn   SLP eval and aspiration precautions.     Presence of IVC filter  Chronic IVC filter as she is at risk for bleeding and had history of DVTs      Goals of care, counseling/discussion  Appreciate palliative care facilitation with goals of care conversation.  Patient is now DNR and other family meeting scheduled for tomorrow.    I also  called and spoke to sister Zaria and she is on board and wants us to continue treating her for encephalopathy and infection and continue psychiatric medications.    Palliative care encounter  Appreciate palliative care recommendations and meeting with family that is her sister.  Family in agreement to make her DNR.  Documentation in place.  Code status updated    Another meeting with sister by palliative. More meetings needed, sister to come her pablo Saturday from [place]      Aspiration pneumonia of right lung  Aspiration even AM of 1/9  Evaluated by SLP, diet changed to minced and now pureed 1/10.   Started Zosyn 1/9        Acute metabolic encephalopathy  2/2 cirrhosis - non compliant with Lactulose, continue lactulose.   Polypharmacy - adjusted medications. Decreased Zyprexa.   SBP could not be ruled out as no pocket for ascites, now has aspiration and R sided on CXR 1/9, escalate Abx to zosyn     Poor nutrition. Added MVI. Ensure adequate nutrition   Severe debility     Seizure history, on keppra BID, asked neurology to weigh in and if EEG needed or if adjustment to keppra dose.     Palliative care facilitating family meetings. Sister to arrive from [place] on saturday          Hypernatremia  Patient has hypernatremia which is controlled. The hypernatremia is due to  Poor PO intake and Lasix . We will aim to correct the sodium by 8-10mEq in 24 hours. We will correct their hypernatremia with Select IV fluids: 5% albumin 12.5gms . The patient's sodium results have been reviewed and are listed below.  Recent Labs   Lab 01/10/24  0557   *     Will check Urine lytes    Bipolar I disorder, most recent episode depressed  Continue home regimen: lithium, olanzapine    Appreciate psychiatry recommendations we will decrease Zyprexa to 2.5mg.      Seizure  History of documented seizures since 2015.  Most recent encounter in June 2023 where she was on 1 g b.i.d. of Keppra.  Continued on Keppra 1 g b.i.d..  Can consider checking  an EEG ensure no seizures if she stays confused and verify with Neurology.  Pending Keppra level      Severe malnutrition  Nutrition consulted. Most recent weight and BMI monitored-     Measurements:  Wt Readings from Last 1 Encounters:   01/10/24 58.1 kg (128 lb 1.4 oz)   Body mass index is 22.69 kg/m².    Patient has been screened and assessed by RD.    Malnutrition Type:  Context: chronic illness  Level: severe    Malnutrition Characteristic Summary:  Subcutaneous Fat (Malnutrition):  (moderate to severe)  Muscle Mass (Malnutrition):  (moderate to severe)  Hand  Strength, Left (Malnutrition): reduced  Hand  Strength, Right (Malnutrition): reduced    Interventions/Recommendations (treatment strategy):  1.) Recommend continuing Low Na diet, fluid per MD. 2.) Recommend Boost Plus (or Boost equivalent) BID to help optimize PRO/Kcal intake (32% of the FR). 3.) Continue to provide folic acid and thiamine- consider adding MVI. RD to monitor PO intake, skin, labs, wt.        Macrocytic anemia  Patient's anemia is currently controlled. Has not received any PRBCs to date. Etiology likely d/t chronic disease due to Chronic liver disease  Current CBC reviewed-   Lab Results   Component Value Date    HGB 8.0 (L) 01/10/2024    HCT 25.9 (L) 01/10/2024     Monitor serial CBC and transfuse if patient becomes hemodynamically unstable, symptomatic or H/H drops below 7.    She is on chronic thiamine and folate.  We will add multivitamin    Ascites  Mild ascites seen on imaging however no pocket seen on attempt for paracentesis on 1/8/24        VTE Risk Mitigation (From admission, onward)           Ordered     Reason for No Pharmacological VTE Prophylaxis  Once        Question:  Reasons:  Answer:  Risk of Bleeding    01/05/24 0317     IP VTE HIGH RISK PATIENT  Once         01/05/24 0317     Place sequential compression device  Until discontinued         01/05/24 0317                    Discharge Planning   BRAYAN: 1/16/2024      Code Status: DNR   Is the patient medically ready for discharge?: No    Reason for patient still in hospital (select all that apply): Patient trending condition  Discharge Plan A: Return to nursing home                  Lenka Ortiz MD  Department of Hospital Medicine   [facility] - Transplant Stepdown

## 2024-01-13 NOTE — PLAN OF CARE
Patent disoriented/confused - responding by opening eyes to voice this AM. VSS. Weaned O2 to 1L NC w/ O2 sats >96%. Purewick removed d/t frequent BMs - patient incontinent of bowels and bladder this shift - changed/cleaned PRN and barrier cream applied to sacrum. Patient assisted w/ turning. NPO status continued - PO lactulose held - patient not awake and alert enough to swallow PO lactulose doses - Dr. Ortiz notified and aware. PO pills crushed and given w/ applesauce per SLP recs. Folic acid and thiamine given IVPB per MAR. D5 1/2NS infusing continuously @ 125cc/hr. Palliative care following - plan to meet w/ patient's sister tomorrow 1/14 to discuss options/GOC. DNR status maintained throughout shift. Telemetry d/c'd. Bed in low position. Call light within reach. Bed alarm set.   Retention Suture Bite Size: 3 mm

## 2024-01-14 LAB
ALBUMIN SERPL BCP-MCNC: 1.7 G/DL (ref 3.5–5.2)
ALP SERPL-CCNC: 165 U/L (ref 55–135)
ALT SERPL W/O P-5'-P-CCNC: 22 U/L (ref 10–44)
ANION GAP SERPL CALC-SCNC: 3 MMOL/L (ref 8–16)
AST SERPL-CCNC: 48 U/L (ref 10–40)
BASOPHILS # BLD AUTO: 0.01 K/UL (ref 0–0.2)
BASOPHILS NFR BLD: 0.2 % (ref 0–1.9)
BILIRUB SERPL-MCNC: 19.8 MG/DL (ref 0.1–1)
BUN SERPL-MCNC: 5 MG/DL (ref 6–20)
CALCIUM SERPL-MCNC: 7.9 MG/DL (ref 8.7–10.5)
CHLORIDE SERPL-SCNC: 122 MMOL/L (ref 95–110)
CO2 SERPL-SCNC: 19 MMOL/L (ref 23–29)
CREAT SERPL-MCNC: 0.4 MG/DL (ref 0.5–1.4)
DIFFERENTIAL METHOD BLD: ABNORMAL
EOSINOPHIL # BLD AUTO: 0.3 K/UL (ref 0–0.5)
EOSINOPHIL NFR BLD: 6.2 % (ref 0–8)
ERYTHROCYTE [DISTWIDTH] IN BLOOD BY AUTOMATED COUNT: 15.6 % (ref 11.5–14.5)
EST. GFR  (NO RACE VARIABLE): >60 ML/MIN/1.73 M^2
GLUCOSE SERPL-MCNC: 209 MG/DL (ref 70–110)
HCT VFR BLD AUTO: 29.2 % (ref 37–48.5)
HGB BLD-MCNC: 9.2 G/DL (ref 12–16)
IMM GRANULOCYTES # BLD AUTO: 0.04 K/UL (ref 0–0.04)
IMM GRANULOCYTES NFR BLD AUTO: 1 % (ref 0–0.5)
INR PPP: 1.9 (ref 0.8–1.2)
LYMPHOCYTES # BLD AUTO: 0.4 K/UL (ref 1–4.8)
LYMPHOCYTES NFR BLD: 8.9 % (ref 18–48)
MAGNESIUM SERPL-MCNC: 1.6 MG/DL (ref 1.6–2.6)
MCH RBC QN AUTO: 38 PG (ref 27–31)
MCHC RBC AUTO-ENTMCNC: 31.5 G/DL (ref 32–36)
MCV RBC AUTO: 121 FL (ref 82–98)
MONOCYTES # BLD AUTO: 0.3 K/UL (ref 0.3–1)
MONOCYTES NFR BLD: 7.4 % (ref 4–15)
NEUTROPHILS # BLD AUTO: 3.1 K/UL (ref 1.8–7.7)
NEUTROPHILS NFR BLD: 76.3 % (ref 38–73)
NRBC BLD-RTO: 0 /100 WBC
PHOSPHATE SERPL-MCNC: 1.4 MG/DL (ref 2.7–4.5)
PLATELET # BLD AUTO: 54 K/UL (ref 150–450)
PMV BLD AUTO: 10.1 FL (ref 9.2–12.9)
POTASSIUM SERPL-SCNC: 2.8 MMOL/L (ref 3.5–5.1)
PROT SERPL-MCNC: 4.5 G/DL (ref 6–8.4)
PROTHROMBIN TIME: 19.8 SEC (ref 9–12.5)
RBC # BLD AUTO: 2.42 M/UL (ref 4–5.4)
SODIUM SERPL-SCNC: 144 MMOL/L (ref 136–145)
WBC # BLD AUTO: 4.04 K/UL (ref 3.9–12.7)

## 2024-01-14 PROCEDURE — 80053 COMPREHEN METABOLIC PANEL: CPT | Performed by: STUDENT IN AN ORGANIZED HEALTH CARE EDUCATION/TRAINING PROGRAM

## 2024-01-14 PROCEDURE — C9113 INJ PANTOPRAZOLE SODIUM, VIA: HCPCS | Performed by: HOSPITALIST

## 2024-01-14 PROCEDURE — 36415 COLL VENOUS BLD VENIPUNCTURE: CPT | Performed by: STUDENT IN AN ORGANIZED HEALTH CARE EDUCATION/TRAINING PROGRAM

## 2024-01-14 PROCEDURE — 63600175 PHARM REV CODE 636 W HCPCS: Performed by: HOSPITALIST

## 2024-01-14 PROCEDURE — 99900035 HC TECH TIME PER 15 MIN (STAT)

## 2024-01-14 PROCEDURE — S5010 5% DEXTROSE AND 0.45% SALINE: HCPCS | Performed by: HOSPITALIST

## 2024-01-14 PROCEDURE — 84100 ASSAY OF PHOSPHORUS: CPT | Performed by: STUDENT IN AN ORGANIZED HEALTH CARE EDUCATION/TRAINING PROGRAM

## 2024-01-14 PROCEDURE — 94761 N-INVAS EAR/PLS OXIMETRY MLT: CPT

## 2024-01-14 PROCEDURE — 83735 ASSAY OF MAGNESIUM: CPT | Performed by: STUDENT IN AN ORGANIZED HEALTH CARE EDUCATION/TRAINING PROGRAM

## 2024-01-14 PROCEDURE — 25000003 PHARM REV CODE 250: Performed by: STUDENT IN AN ORGANIZED HEALTH CARE EDUCATION/TRAINING PROGRAM

## 2024-01-14 PROCEDURE — 85025 COMPLETE CBC W/AUTO DIFF WBC: CPT | Performed by: STUDENT IN AN ORGANIZED HEALTH CARE EDUCATION/TRAINING PROGRAM

## 2024-01-14 PROCEDURE — 27000221 HC OXYGEN, UP TO 24 HOURS

## 2024-01-14 PROCEDURE — 20600001 HC STEP DOWN PRIVATE ROOM

## 2024-01-14 PROCEDURE — 85610 PROTHROMBIN TIME: CPT | Performed by: STUDENT IN AN ORGANIZED HEALTH CARE EDUCATION/TRAINING PROGRAM

## 2024-01-14 PROCEDURE — 25000003 PHARM REV CODE 250: Performed by: HOSPITALIST

## 2024-01-14 RX ORDER — DEXTROSE MONOHYDRATE, SODIUM CHLORIDE, AND POTASSIUM CHLORIDE 50; 1.49; 9 G/1000ML; G/1000ML; G/1000ML
INJECTION, SOLUTION INTRAVENOUS CONTINUOUS
Status: DISCONTINUED | OUTPATIENT
Start: 2024-01-14 | End: 2024-01-16

## 2024-01-14 RX ADMIN — LEVETIRACETAM 1000 MG: 100 INJECTION INTRAVENOUS at 08:01

## 2024-01-14 RX ADMIN — AMOXICILLIN AND CLAVULANATE POTASSIUM 875.2 MG: 400; 57 POWDER, FOR SUSPENSION ORAL at 09:01

## 2024-01-14 RX ADMIN — DEXTROSE MONOHYDRATE, SODIUM CHLORIDE, AND POTASSIUM CHLORIDE: 50; 9; 1.49 INJECTION, SOLUTION INTRAVENOUS at 02:01

## 2024-01-14 RX ADMIN — RIFAXIMIN 550 MG: 550 TABLET ORAL at 08:01

## 2024-01-14 RX ADMIN — AMOXICILLIN AND CLAVULANATE POTASSIUM 875.2 MG: 400; 57 POWDER, FOR SUSPENSION ORAL at 08:01

## 2024-01-14 RX ADMIN — LITHIUM CARBONATE 300 MG: 300 TABLET, FILM COATED, EXTENDED RELEASE ORAL at 09:01

## 2024-01-14 RX ADMIN — DEXTROSE MONOHYDRATE AND SODIUM CHLORIDE: 5; .45 INJECTION, SOLUTION INTRAVENOUS at 12:01

## 2024-01-14 RX ADMIN — LEVETIRACETAM 1000 MG: 100 INJECTION INTRAVENOUS at 09:01

## 2024-01-14 RX ADMIN — FOLIC ACID 1 MG: 5 INJECTION, SOLUTION INTRAMUSCULAR; INTRAVENOUS; SUBCUTANEOUS at 08:01

## 2024-01-14 RX ADMIN — RIFAXIMIN 550 MG: 550 TABLET ORAL at 09:01

## 2024-01-14 RX ADMIN — OLANZAPINE 5 MG: 5 TABLET, ORALLY DISINTEGRATING ORAL at 09:01

## 2024-01-14 RX ADMIN — DEXTROSE MONOHYDRATE, SODIUM CHLORIDE, AND POTASSIUM CHLORIDE: 50; 9; 1.49 INJECTION, SOLUTION INTRAVENOUS at 10:01

## 2024-01-14 RX ADMIN — DEXTROSE MONOHYDRATE AND SODIUM CHLORIDE: 5; .45 INJECTION, SOLUTION INTRAVENOUS at 04:01

## 2024-01-14 RX ADMIN — LACTULOSE 20 G: 20 SOLUTION ORAL at 09:01

## 2024-01-14 RX ADMIN — THIAMINE HYDROCHLORIDE 100 MG: 100 INJECTION, SOLUTION INTRAMUSCULAR; INTRAVENOUS at 09:01

## 2024-01-14 RX ADMIN — PANTOPRAZOLE SODIUM 40 MG: 40 INJECTION, POWDER, FOR SOLUTION INTRAVENOUS at 08:01

## 2024-01-14 NOTE — PROGRESS NOTES
[facility] - Transplant [facility] Medicine  Progress Note    Patient Name: Marely Hamilton  MRN: [id number]  Patient Class: IP- Inpatient   Admission Date: 1/4/2024  Length of Stay: 9 days  Attending Physician: Lenka Ortiz MD  Primary Care Provider: Viktor Ross MD        Subjective:     Principal Problem:Decompensated hepatic cirrhosis        HPI:  Patient is a 58-year-old woman with past medical history significant for alcoholic cirrhosis, bipolar disorder, chronic anemia, thrombocytopenia, cholelithiasis, DVT with IVC filter in place, prior GI bleeds, ESBL UTI history, seizure disorder presenting from [facility] for evaluation of abdominal distension, peripheral edema, and jaundice.     Patient denies fever, chills, or abdominal pain. Patient was recently admitted in early December for hepatic encephalopathy.   Patient denies any pain at this time. She reports she has been urinating frequently though difficult to obtain history.   She denies recent illness/fever/chills/nausea/vomiting/diarrhea/constipation.  Patient has not been taking lactulose. Reports compliance with psychiatric medications and A&Ox3 but reports she can't take the lactulose due to not tolerating the sun.    Hospital Course:   In the ED, vitals stable but hypoxic on room air to the 80s, which improved with supplemental oxygen.  Presentation consistent with decompensated liver failure, meld score 22.    She was sent in by her nursing home facility for mild abdominal distention as well as significant jaundice.    Chest x-ray revealed a moderate-to-large pleural effusion with compressive atelectasis. Likely from her ascites. Her abdomen is soft, nontender.      Intake labs remarkable for Na 142, BUN 6, Cr 0.4, Calcium 7.8, , Tbili 12.1, , ALT 41, Ammonia 114, lactic 1.0.     Will admit for decompensated cirrhosis. Continue on lactulose for treatment of HE, rocephin for SBP ppx. Hepatology and IR consulted for  thoracentesis and further recommendations.   Patient is oriented to person, place, time, and situation but with some delusions.     Overview/Hospital Course:  Palliative care has been helping coordinate with sister. She is now DNR/DNI.     Follow nutrition goals and recommendations however follow aspiration precautions.     Appreciate psychiatry recommendations. Continue lithium and and decreased zyprexa.     Will discuss with neurology for her Keppra.     Escalated antibiotic coverage from rocephin to zosyn due to aspiration overnight on 1/9/24. Diet changed to pureed.     Jose evaluating EEG to r/o seizures, asked if can wean keppra. Keppra level is pending.        Interval History:   1/14: Discussed care with sister.     Review of Systems   Unable to perform ROS: Mental status change   Constitutional:  Positive for appetite change. Negative for activity change and chills.   HENT:          Desquamation of lips   Respiratory:          Nasal canula in place   Gastrointestinal:         Incontinence of stools   Genitourinary:         Urinary incontinence   Skin:  Positive for color change.   Neurological:  Positive for tremors. Negative for speech difficulty.   Psychiatric/Behavioral:  Positive for confusion.      Objective:     Vital Signs (Most Recent):  Temp: 97.6 °F (36.4 °C) (01/14/24 1222)  Pulse: 65 (01/14/24 1222)  Resp: 20 (01/14/24 1222)  BP: (!) 117/58 (01/14/24 1222)  SpO2: 97 % (01/14/24 1222) Vital Signs (24h Range):  Temp:  [97.6 °F (36.4 °C)-98.8 °F (37.1 °C)] 97.6 °F (36.4 °C)  Pulse:  [62-75] 65  Resp:  [15-20] 20  SpO2:  [97 %-100 %] 97 %  BP: (111-118)/(57-60) 117/58     Weight: 58.1 kg (128 lb 1.4 oz)  Body mass index is 22.69 kg/m².    Intake/Output Summary (Last 24 hours) at 1/14/2024 1422  Last data filed at 1/14/2024 1231  Gross per 24 hour   Intake 0 ml   Output 170 ml   Net -170 ml         Physical Exam  Vitals reviewed.   Constitutional:       General: She is not in acute distress.      Appearance: She is cachectic. She is ill-appearing.   HENT:      Head: Normocephalic and atraumatic.      Mouth/Throat:      Mouth: Mucous membranes are dry.      Pharynx: No oropharyngeal exudate.      Comments: Desquamation of lips  Eyes:      General: Scleral icterus present.      Extraocular Movements: Extraocular movements intact.   Cardiovascular:      Rate and Rhythm: Normal rate and regular rhythm.      Heart sounds: Normal heart sounds. No murmur heard.  Pulmonary:      Effort: Pulmonary effort is normal. No respiratory distress.      Breath sounds: No wheezing.   Abdominal:      General: Bowel sounds are normal. There is no distension.      Palpations: Abdomen is soft.      Tenderness: There is no abdominal tenderness. There is no guarding.   Musculoskeletal:         General: No tenderness. Normal range of motion.      Cervical back: Normal range of motion and neck supple.   Lymphadenopathy:      Cervical: No cervical adenopathy.   Skin:     General: Skin is warm and dry.      Capillary Refill: Capillary refill takes less than 2 seconds.      Coloration: Skin is jaundiced.      Findings: No rash.   Neurological:      Mental Status: She is alert. She is disoriented.      Motor: Weakness: bilateral lower extremities.      Comments: Poor participation   Psychiatric:         Attention and Perception: She perceives visual hallucinations.         Behavior: Behavior is cooperative.         Thought Content: Thought content is delusional.             Significant Labs: All pertinent labs within the past 24 hours have been reviewed.    Significant Imaging: I have reviewed all pertinent imaging results/findings within the past 24 hours.    Assessment/Plan:      * Decompensated hepatic cirrhosis  Patient with known Cirrhosis with Child's class C. Co-morbidities are present and inclusive of ascites, malnutrition, anemia/pancytopenia, and Pleural effusion .  MELD-Na score calculated; MELD 3.0: 32 at 1/10/2024  5:57  AM  MELD-Na: 27 at 1/10/2024  5:57 AM  Calculated from:  Serum Creatinine: 0.5 mg/dL (Using min of 1 mg/dL) at 1/10/2024  5:57 AM  Serum Sodium: 147 mmol/L (Using max of 137 mmol/L) at 1/10/2024  5:57 AM  Total Bilirubin: 18.1 mg/dL at 1/10/2024  5:57 AM  Serum Albumin: 1.8 g/dL at 1/10/2024  5:57 AM  INR(ratio): 2.4 at 1/10/2024  5:57 AM  Age at listing (hypothetical): 58 years  Sex: Female at 1/10/2024  5:57 AM      Continue chronic meds. Etiology likely ETOH. Will avoid any hepatotoxic meds, and monitor CBC/CMP/INR for synthetic function.     Presenting with decompensation (increased ascites, Bili 12, ammonia 114).  Status post thoracentesis of 650 mL.  Awaiting cultures.  Could not find a pocket for paracentesis    -restart lactulose as she has been noncompliant  Appreciate hepatology recommendations.      Recommendations to include physical therapy occupational therapy, palliative care discussions for goals of care.  She is not a candidate for transplantation given frailty, poor social support and noncompliance with medication    Aspiration of food  Event on 1/9.   SLP monitoring and diet changed from minced to pureed 1/10      Acute hypoxic respiratory failure  Patient with Hypoxic Respiratory failure which is Acute on chronic.  she is not on home oxygen. Supplemental oxygen was provided and noted improvement in her O2 sats.    -chest x-ray with right-sided pleural effusion.  Status post thoracentesis and 650 mL of fluid drained.  Awaiting cultures.  Oxygen requirements decreased to 2 L at this time.  Continue to monitor    Now with aspiration PNA. Conitnue zosyn   SLP eval and aspiration precautions.     Presence of IVC filter  Chronic IVC filter as she is at risk for bleeding and had history of DVTs      Goals of care, counseling/discussion  Appreciate palliative care facilitation with goals of care conversation.  Patient is now DNR and other family meeting scheduled for tomorrow.    I also called and spoke  to sister Zaria and she is on board and wants us to continue treating her for encephalopathy and infection and continue psychiatric medications.    Palliative care encounter  Appreciate palliative care recommendations and meeting with family that is her sister.  Family in agreement to make her DNR.  Documentation in place.  Code status updated    Another meeting with sister by palliative. More meetings needed, sister to come her pablo Saturday from [place]      Aspiration pneumonia of right lung  Aspiration even AM of 1/9  Evaluated by SLP, diet changed to minced and now pureed 1/10.   Started Zosyn 1/9        Acute metabolic encephalopathy  2/2 cirrhosis - non compliant with Lactulose, continue lactulose.   Polypharmacy - adjusted medications. Decreased Zyprexa.   SBP could not be ruled out as no pocket for ascites, now has aspiration and R sided on CXR 1/9, escalate Abx to zosyn     Poor nutrition. Added MVI. Ensure adequate nutrition   Severe debility     Seizure history, on keppra BID, asked neurology to weigh in and if EEG needed or if adjustment to keppra dose.     Palliative care facilitating family meetings. Sister to arrive from [place] on saturday          Hypernatremia  Patient has hypernatremia which is controlled. The hypernatremia is due to  Poor PO intake and Lasix . We will aim to correct the sodium by 8-10mEq in 24 hours. We will correct their hypernatremia with Select IV fluids: 5% albumin 12.5gms . The patient's sodium results have been reviewed and are listed below.  Recent Labs   Lab 01/10/24  0557   *     Will check Urine lytes    Bipolar I disorder, most recent episode depressed  Continue home regimen: lithium, olanzapine    Appreciate psychiatry recommendations we will decrease Zyprexa to 2.5mg.      Seizure  History of documented seizures since 2015.  Most recent encounter in June 2023 where she was on 1 g b.i.d. of Keppra.  Continued on Keppra 1 g b.i.d..  Can consider checking an EEG ensure no  seizures if she stays confused and verify with Neurology.  Pending Keppra level      Severe malnutrition  Nutrition consulted. Most recent weight and BMI monitored-     Measurements:  Wt Readings from Last 1 Encounters:   01/10/24 58.1 kg (128 lb 1.4 oz)   Body mass index is 22.69 kg/m².    Patient has been screened and assessed by RD.    Malnutrition Type:  Context: chronic illness  Level: severe    Malnutrition Characteristic Summary:  Subcutaneous Fat (Malnutrition):  (moderate to severe)  Muscle Mass (Malnutrition):  (moderate to severe)  Hand  Strength, Left (Malnutrition): reduced  Hand  Strength, Right (Malnutrition): reduced    Interventions/Recommendations (treatment strategy):  1.) Recommend continuing Low Na diet, fluid per MD. 2.) Recommend Boost Plus (or Boost equivalent) BID to help optimize PRO/Kcal intake (32% of the FR). 3.) Continue to provide folic acid and thiamine- consider adding MVI. RD to monitor PO intake, skin, labs, wt.        Macrocytic anemia  Patient's anemia is currently controlled. Has not received any PRBCs to date. Etiology likely d/t chronic disease due to Chronic liver disease  Current CBC reviewed-   Lab Results   Component Value Date    HGB 8.0 (L) 01/10/2024    HCT 25.9 (L) 01/10/2024     Monitor serial CBC and transfuse if patient becomes hemodynamically unstable, symptomatic or H/H drops below 7.    She is on chronic thiamine and folate.  We will add multivitamin    Ascites  Mild ascites seen on imaging however no pocket seen on attempt for paracentesis on 1/8/24        VTE Risk Mitigation (From admission, onward)           Ordered     Reason for No Pharmacological VTE Prophylaxis  Once        Question:  Reasons:  Answer:  Risk of Bleeding    01/05/24 0317     IP VTE HIGH RISK PATIENT  Once         01/05/24 0317     Place sequential compression device  Until discontinued         01/05/24 0317                    Discharge Planning   BRAYAN: 1/16/2024     Code Status: DNR    Is the patient medically ready for discharge?: No    Reason for patient still in hospital (select all that apply): Patient trending condition  Discharge Plan A: Return to nursing home                  Lenka Ortiz MD  Department of Hospital Medicine   [facility] - Transplant Stepdown

## 2024-01-14 NOTE — SUBJECTIVE & OBJECTIVE
Interval History:   1/14: Discussed care with sister.     Review of Systems   Unable to perform ROS: Mental status change   Constitutional:  Positive for appetite change. Negative for activity change and chills.   HENT:          Desquamation of lips   Respiratory:          Nasal canula in place   Gastrointestinal:         Incontinence of stools   Genitourinary:         Urinary incontinence   Skin:  Positive for color change.   Neurological:  Positive for tremors. Negative for speech difficulty.   Psychiatric/Behavioral:  Positive for confusion.      Objective:     Vital Signs (Most Recent):  Temp: 97.6 °F (36.4 °C) (01/14/24 1222)  Pulse: 65 (01/14/24 1222)  Resp: 20 (01/14/24 1222)  BP: (!) 117/58 (01/14/24 1222)  SpO2: 97 % (01/14/24 1222) Vital Signs (24h Range):  Temp:  [97.6 °F (36.4 °C)-98.8 °F (37.1 °C)] 97.6 °F (36.4 °C)  Pulse:  [62-75] 65  Resp:  [15-20] 20  SpO2:  [97 %-100 %] 97 %  BP: (111-118)/(57-60) 117/58     Weight: 58.1 kg (128 lb 1.4 oz)  Body mass index is 22.69 kg/m².    Intake/Output Summary (Last 24 hours) at 1/14/2024 1422  Last data filed at 1/14/2024 1231  Gross per 24 hour   Intake 0 ml   Output 170 ml   Net -170 ml         Physical Exam  Vitals reviewed.   Constitutional:       General: She is not in acute distress.     Appearance: She is cachectic. She is ill-appearing.   HENT:      Head: Normocephalic and atraumatic.      Mouth/Throat:      Mouth: Mucous membranes are dry.      Pharynx: No oropharyngeal exudate.      Comments: Desquamation of lips  Eyes:      General: Scleral icterus present.      Extraocular Movements: Extraocular movements intact.   Cardiovascular:      Rate and Rhythm: Normal rate and regular rhythm.      Heart sounds: Normal heart sounds. No murmur heard.  Pulmonary:      Effort: Pulmonary effort is normal. No respiratory distress.      Breath sounds: No wheezing.   Abdominal:      General: Bowel sounds are normal. There is no distension.      Palpations: Abdomen  is soft.      Tenderness: There is no abdominal tenderness. There is no guarding.   Musculoskeletal:         General: No tenderness. Normal range of motion.      Cervical back: Normal range of motion and neck supple.   Lymphadenopathy:      Cervical: No cervical adenopathy.   Skin:     General: Skin is warm and dry.      Capillary Refill: Capillary refill takes less than 2 seconds.      Coloration: Skin is jaundiced.      Findings: No rash.   Neurological:      Mental Status: She is alert. She is disoriented.      Motor: Weakness: bilateral lower extremities.      Comments: Poor participation   Psychiatric:         Attention and Perception: She perceives visual hallucinations.         Behavior: Behavior is cooperative.         Thought Content: Thought content is delusional.             Significant Labs: All pertinent labs within the past 24 hours have been reviewed.    Significant Imaging: I have reviewed all pertinent imaging results/findings within the past 24 hours.

## 2024-01-14 NOTE — PLAN OF CARE
VSS. Orientation waxes and wanes. Repositioning and weight shifting assistance provided. Purewick in place. Fall precautions maintained, bed alarm set. D5 1/2 infusing 125 mL/hr. On 1L O2 via nasal cannula.

## 2024-01-14 NOTE — PLAN OF CARE
Orientation waxes/wanes - patient alert and opens eyes spontaneously, but remains slightly confused. Patient on 1L NC w/ O2 sats >96% throughout shift. Patient incontinent of bowels and bladder. Purewick in place to LIWS w/ dark nida urine noted. D5 1/2NS infusing continuously @ 125cc/hr - order d/c'd. K 2.8 on AM labs - D5 NS w/ 20mEq KCL started and infusing continuously @ 125cc/hr. Per SLP recs - patient remains NPO - okay given to crush PO meds and administer w/ applesauce. A couple ice chips given for pleasure this shift. Holding PO lactulose - okay given per Dr. Ortiz via verbal order. IVBP folic acid and thiamine continued. Patient assisted w/ turning utilizing wedge/pillows. Barrier cream applied to sacrum. Bed in low position. Call light within reach. Bed alarm on. Sister (Zaria) at bedside today - stated she will come back again tomorrow 1/15.    Sister asking to speak w/ MD and palliative care MD. Dr. Ortiz notified and spoke w/ sister over the phone. On-call palliative care MD notified and stated she will call sister after reviewing patient's chart.

## 2024-01-15 LAB
ALBUMIN SERPL BCP-MCNC: 1.6 G/DL (ref 3.5–5.2)
ALP SERPL-CCNC: 173 U/L (ref 55–135)
ALT SERPL W/O P-5'-P-CCNC: 26 U/L (ref 10–44)
ANION GAP SERPL CALC-SCNC: 3 MMOL/L (ref 8–16)
AST SERPL-CCNC: 48 U/L (ref 10–40)
BASOPHILS # BLD AUTO: 0.01 K/UL (ref 0–0.2)
BASOPHILS NFR BLD: 0.3 % (ref 0–1.9)
BILIRUB SERPL-MCNC: 21.4 MG/DL (ref 0.1–1)
BUN SERPL-MCNC: 4 MG/DL (ref 6–20)
CALCIUM SERPL-MCNC: 7.7 MG/DL (ref 8.7–10.5)
CHLORIDE SERPL-SCNC: 124 MMOL/L (ref 95–110)
CO2 SERPL-SCNC: 18 MMOL/L (ref 23–29)
CREAT SERPL-MCNC: 0.4 MG/DL (ref 0.5–1.4)
DIFFERENTIAL METHOD BLD: ABNORMAL
EOSINOPHIL # BLD AUTO: 0.2 K/UL (ref 0–0.5)
EOSINOPHIL NFR BLD: 5.9 % (ref 0–8)
ERYTHROCYTE [DISTWIDTH] IN BLOOD BY AUTOMATED COUNT: 15.8 % (ref 11.5–14.5)
EST. GFR  (NO RACE VARIABLE): >60 ML/MIN/1.73 M^2
GLUCOSE SERPL-MCNC: 196 MG/DL (ref 70–110)
HCT VFR BLD AUTO: 29.3 % (ref 37–48.5)
HGB BLD-MCNC: 9.4 G/DL (ref 12–16)
IMM GRANULOCYTES # BLD AUTO: 0.03 K/UL (ref 0–0.04)
IMM GRANULOCYTES NFR BLD AUTO: 0.8 % (ref 0–0.5)
INR PPP: 2.1 (ref 0.8–1.2)
LYMPHOCYTES # BLD AUTO: 0.4 K/UL (ref 1–4.8)
LYMPHOCYTES NFR BLD: 9.8 % (ref 18–48)
MAGNESIUM SERPL-MCNC: 1.5 MG/DL (ref 1.6–2.6)
MCH RBC QN AUTO: 37.3 PG (ref 27–31)
MCHC RBC AUTO-ENTMCNC: 32.1 G/DL (ref 32–36)
MCV RBC AUTO: 116 FL (ref 82–98)
MONOCYTES # BLD AUTO: 0.4 K/UL (ref 0.3–1)
MONOCYTES NFR BLD: 9 % (ref 4–15)
NEUTROPHILS # BLD AUTO: 2.9 K/UL (ref 1.8–7.7)
NEUTROPHILS NFR BLD: 74.2 % (ref 38–73)
NRBC BLD-RTO: 0 /100 WBC
PHOSPHATE SERPL-MCNC: 1.1 MG/DL (ref 2.7–4.5)
PLATELET # BLD AUTO: 45 K/UL (ref 150–450)
PMV BLD AUTO: 10.2 FL (ref 9.2–12.9)
POTASSIUM SERPL-SCNC: 3.3 MMOL/L (ref 3.5–5.1)
PROT SERPL-MCNC: 4.5 G/DL (ref 6–8.4)
PROTHROMBIN TIME: 21.7 SEC (ref 9–12.5)
RBC # BLD AUTO: 2.52 M/UL (ref 4–5.4)
SODIUM SERPL-SCNC: 145 MMOL/L (ref 136–145)
WBC # BLD AUTO: 3.87 K/UL (ref 3.9–12.7)

## 2024-01-15 PROCEDURE — 84100 ASSAY OF PHOSPHORUS: CPT | Performed by: STUDENT IN AN ORGANIZED HEALTH CARE EDUCATION/TRAINING PROGRAM

## 2024-01-15 PROCEDURE — 63600175 PHARM REV CODE 636 W HCPCS: Performed by: HOSPITALIST

## 2024-01-15 PROCEDURE — 85025 COMPLETE CBC W/AUTO DIFF WBC: CPT | Performed by: STUDENT IN AN ORGANIZED HEALTH CARE EDUCATION/TRAINING PROGRAM

## 2024-01-15 PROCEDURE — 25000003 PHARM REV CODE 250: Performed by: HOSPITALIST

## 2024-01-15 PROCEDURE — C9113 INJ PANTOPRAZOLE SODIUM, VIA: HCPCS | Performed by: HOSPITALIST

## 2024-01-15 PROCEDURE — 20600001 HC STEP DOWN PRIVATE ROOM

## 2024-01-15 PROCEDURE — 36415 COLL VENOUS BLD VENIPUNCTURE: CPT | Performed by: STUDENT IN AN ORGANIZED HEALTH CARE EDUCATION/TRAINING PROGRAM

## 2024-01-15 PROCEDURE — 25000003 PHARM REV CODE 250: Performed by: STUDENT IN AN ORGANIZED HEALTH CARE EDUCATION/TRAINING PROGRAM

## 2024-01-15 PROCEDURE — 83735 ASSAY OF MAGNESIUM: CPT | Performed by: STUDENT IN AN ORGANIZED HEALTH CARE EDUCATION/TRAINING PROGRAM

## 2024-01-15 PROCEDURE — 92526 ORAL FUNCTION THERAPY: CPT

## 2024-01-15 PROCEDURE — 80053 COMPREHEN METABOLIC PANEL: CPT | Performed by: STUDENT IN AN ORGANIZED HEALTH CARE EDUCATION/TRAINING PROGRAM

## 2024-01-15 PROCEDURE — 85610 PROTHROMBIN TIME: CPT | Performed by: STUDENT IN AN ORGANIZED HEALTH CARE EDUCATION/TRAINING PROGRAM

## 2024-01-15 RX ADMIN — RIFAXIMIN 550 MG: 550 TABLET ORAL at 09:01

## 2024-01-15 RX ADMIN — OLANZAPINE 5 MG: 5 TABLET, ORALLY DISINTEGRATING ORAL at 09:01

## 2024-01-15 RX ADMIN — PANTOPRAZOLE SODIUM 40 MG: 40 INJECTION, POWDER, FOR SOLUTION INTRAVENOUS at 07:01

## 2024-01-15 RX ADMIN — LEVETIRACETAM 1000 MG: 100 INJECTION INTRAVENOUS at 07:01

## 2024-01-15 RX ADMIN — AMOXICILLIN AND CLAVULANATE POTASSIUM 875.2 MG: 400; 57 POWDER, FOR SUSPENSION ORAL at 08:01

## 2024-01-15 RX ADMIN — LEVETIRACETAM 1000 MG: 100 INJECTION INTRAVENOUS at 09:01

## 2024-01-15 RX ADMIN — AMOXICILLIN AND CLAVULANATE POTASSIUM 8000 MG: 400; 57 POWDER, FOR SUSPENSION ORAL at 09:01

## 2024-01-15 RX ADMIN — LACTULOSE 20 G: 20 SOLUTION ORAL at 09:01

## 2024-01-15 RX ADMIN — FOLIC ACID 1 MG: 5 INJECTION, SOLUTION INTRAMUSCULAR; INTRAVENOUS; SUBCUTANEOUS at 08:01

## 2024-01-15 RX ADMIN — LACTULOSE 20 G: 20 SOLUTION ORAL at 08:01

## 2024-01-15 RX ADMIN — THIAMINE HYDROCHLORIDE 100 MG: 100 INJECTION, SOLUTION INTRAMUSCULAR; INTRAVENOUS at 07:01

## 2024-01-15 RX ADMIN — LITHIUM CARBONATE 300 MG: 300 TABLET, FILM COATED, EXTENDED RELEASE ORAL at 09:01

## 2024-01-15 RX ADMIN — DEXTROSE MONOHYDRATE, SODIUM CHLORIDE, AND POTASSIUM CHLORIDE: 50; 9; 1.49 INJECTION, SOLUTION INTRAVENOUS at 06:01

## 2024-01-15 RX ADMIN — RIFAXIMIN 550 MG: 550 TABLET ORAL at 08:01

## 2024-01-15 RX ADMIN — DEXTROSE MONOHYDRATE, SODIUM CHLORIDE, AND POTASSIUM CHLORIDE: 50; 9; 1.49 INJECTION, SOLUTION INTRAVENOUS at 01:01

## 2024-01-15 NOTE — PROGRESS NOTES
Chart reviewed: 10 minutes discussing with primary nurse,20 minutes prior to conversation with sister.  Patient awake alert confused.   Wanted me to find the woman in the blue jacket that works for [employer] that was outside of the room.  She wanted to make sure that she was okay.  Reassured her that I would go look.   Lips are dry.    Vital signs reviewed.    Breathing effort is relaxed.    Jaundice obvious.    Advance Care Planning     Date: 01/14/2024    Today a voluntary meeting took place: bedside and then I called sister who was not in the room.    Patient Participation: Patient is able to participate. She told me she wanted to be comfortable and have pain controlled.      Attendees (Name and  Relationship to patient): Health care power of : Sister Zaria Hamilton by phone  [phone]     Staff attendees (Name and  Role): Nisha Burnette MD    ACP Conversation (General): Understanding of current condition I attempted to review what was going on with the patient. Apparently, Zaria had spoke with her primary team. She interrupted and said she wanted to know the difference between palliative and hospice. I informed her. She said she wanted to know how we think someone is dying. I reviewed the usual understanding of end stage liver disease.  I asked how I could best help her. She said she was trying to understand the difference. We talked about home hospice- not acceptable as patient does not have a home or a caregiver.  She wanted her to go to an inpatient facility.  Unfortunately patient at this time does not meet inpatient criteria for symptom management.  She wanted to know why she was not being fed.  Apparently she has had some concern over choking.  I said we can start pleasure feeding.  I also talked about why a feeding tube was not indicated.     Code Status: DNR; status confirmed/order placed in chart     ACP Documents: None    Goals of care: The patient endorses that what is most important  right now is to focus on symptom/pain control. Sister did not answer.    Accordingly, we have decided that the best plan to meet the patient's goals includes  unclear from the sister's perspective.      Recommendations/  Follow-up tasks: Other (specify below) Will need clarification of sister's concerns.       Length of ACP   conversation in minutes: >16 minutes

## 2024-01-15 NOTE — NURSING
"Pt is expressing desire to eat. Requested apple sauce earlier and just asked, "can I eat today?".  She swallowed meds with apple sauce (per speech therapy recommendation) and ate ice chips with RN without any coughing or notable difficulty. Aspiration precautions maintained.   "

## 2024-01-15 NOTE — PLAN OF CARE
Pt aao x2. Mental status waxes and wanes. She has been disoriented to situation. Started on diet today per speech recommendations. She ate about 25% of lunch. Incontinent of urine and stool. Pure wick in place draining dark nida urine. She had 1 large BM so far this shift. Turning her q2 hours and prn. O2 sats have remained >94% on RA. Dr. Ortiz to update sister on plan of care.

## 2024-01-15 NOTE — PT/OT/SLP PROGRESS
Speech Language Pathology Treatment    Patient Name:  Marely Hamilton   MRN:  [id number]  Admitting Diagnosis: Decompensated hepatic cirrhosis    Recommendations:                 General Recommendations:  Dysphagia therapy  Diet recommendations:  Minced & Moist Diet - IDDSI Level 5, Liquid Diet Level: Thin liquids - IDDSI Level 0   Aspiration Precautions: Eliminate distractions, Feed only when awake/alert, Frequent oral care, HOB to 90 degrees, Meds crushed in puree, Meds whole buried in puree, Remain upright 30 minutes post meal, Small bites/sips, and Standard aspiration precautions   General Precautions: Standard, aspiration, fall  Communication strategies:  provide increased time to answer and go to room if call light pushed    Assessment:     Marely Hamilton is a 58 y.o. female with an SLP diagnosis of  dysphagia .  She presents with improved swallow function and tolerance of oral trials this date.    Subjective     Spoke with RN prior to entering pt's room . Pt seen bedside for session. Alert and agreeable to ST.       Pain/Comfort:  Pain Rating 1: 0/10  Pain Rating Post-Intervention 1: 0/10    Respiratory Status: Room air    Objective:     Has the patient been evaluated by SLP for swallowing?   Yes  Keep patient NPO? No   Current Respiratory Status:    room air    Pt seen for ongoing assessment of swallow function. Pt alert and interactive this date. Per chart, she has been requesting food. Pt agreeable to trials of thin liquids via cup and straw, applesauce, and crackers. Oral phase c/b mildly increased oral prep with regular solid, but overall functional. Pharyngeal phase of the swallow unremarkable, with no overt s/sx airway threat noted. Appropriate to advance to minced and moist diet with thin liquids. RN notified. Order requested from team. Provided education re: role of ST, POC, diet recs, and swallow precautions. At end of session, pt remained bedside with call light and all needs in reach. White board  updated.        Goals:   Multidisciplinary Problems       SLP Goals          Problem: SLP    Goal Priority Disciplines Outcome   SLP Goal     SLP Ongoing, Progressing   Description: Speech Language Pathology Goals  Goals expected to be met by 1/23    1. Pt will tolerate least restrictive PO diet without any overt s/sx of airway compromise.                                Plan:     Patient to be seen:  3 x/week   Plan of Care expires:  02/08/24  Plan of Care reviewed with:  patient   SLP Follow-Up:  Yes       Discharge recommendations:  Low Intensity Therapy   Barriers to Discharge:  Level of Skilled Assistance Needed      Time Tracking:     SLP Treatment Date:   01/15/24  Speech Start Time:  0950  Speech Stop Time:  1000     Speech Total Time (min):  10 min    Billable Minutes: Treatment Swallowing Dysfunction 10    01/15/2024

## 2024-01-15 NOTE — PLAN OF CARE
VSS. Orientation waxes and wanes but some confusion noted even when pt is AAOx4. Repositioning and weight shifting assistance provided. Purewick in place. Fall precautions maintained, bed alarm set. Continuous fluids infusing 125 mL/hr:  D5 0.9% NaCl with KCl 20mEq (not supplied on unit, contact pharmacy for refill). Pt removed nasal cannula, SaO2 has remained 95% or greater on room air. Meds given with apple sauce, ice chips provided for pleasure (see speech therapy note from 1/12) ; otherwise NPO. Aspiration precautions maintained.     Call light with reach. Encouraged pt to call nurse for assistance. Pt expressed desire to eat--see previous note. No concerns voiced at this time.

## 2024-01-16 LAB
ALBUMIN SERPL BCP-MCNC: 1.6 G/DL (ref 3.5–5.2)
ALP SERPL-CCNC: 175 U/L (ref 55–135)
ALT SERPL W/O P-5'-P-CCNC: 24 U/L (ref 10–44)
ANION GAP SERPL CALC-SCNC: 4 MMOL/L (ref 8–16)
ANISOCYTOSIS BLD QL SMEAR: SLIGHT
AST SERPL-CCNC: 42 U/L (ref 10–40)
BASOPHILS # BLD AUTO: 0.02 K/UL (ref 0–0.2)
BASOPHILS NFR BLD: 0.4 % (ref 0–1.9)
BILIRUB SERPL-MCNC: 23.6 MG/DL (ref 0.1–1)
BUN SERPL-MCNC: 3 MG/DL (ref 6–20)
BURR CELLS BLD QL SMEAR: ABNORMAL
CALCIUM SERPL-MCNC: 7.4 MG/DL (ref 8.7–10.5)
CHLORIDE SERPL-SCNC: 124 MMOL/L (ref 95–110)
CO2 SERPL-SCNC: 16 MMOL/L (ref 23–29)
CREAT SERPL-MCNC: 0.4 MG/DL (ref 0.5–1.4)
DIFFERENTIAL METHOD BLD: ABNORMAL
EOSINOPHIL # BLD AUTO: 0.2 K/UL (ref 0–0.5)
EOSINOPHIL NFR BLD: 4.3 % (ref 0–8)
ERYTHROCYTE [DISTWIDTH] IN BLOOD BY AUTOMATED COUNT: 16 % (ref 11.5–14.5)
EST. GFR  (NO RACE VARIABLE): >60 ML/MIN/1.73 M^2
GLUCOSE SERPL-MCNC: 211 MG/DL (ref 70–110)
HCT VFR BLD AUTO: 29.4 % (ref 37–48.5)
HGB BLD-MCNC: 9.4 G/DL (ref 12–16)
HYPOCHROMIA BLD QL SMEAR: ABNORMAL
IMM GRANULOCYTES # BLD AUTO: 0.05 K/UL (ref 0–0.04)
IMM GRANULOCYTES NFR BLD AUTO: 1 % (ref 0–0.5)
INR PPP: 2.2 (ref 0.8–1.2)
LYMPHOCYTES # BLD AUTO: 0.5 K/UL (ref 1–4.8)
LYMPHOCYTES NFR BLD: 9.1 % (ref 18–48)
MAGNESIUM SERPL-MCNC: 1.4 MG/DL (ref 1.6–2.6)
MCH RBC QN AUTO: 37.9 PG (ref 27–31)
MCHC RBC AUTO-ENTMCNC: 32 G/DL (ref 32–36)
MCV RBC AUTO: 119 FL (ref 82–98)
MONOCYTES # BLD AUTO: 0.4 K/UL (ref 0.3–1)
MONOCYTES NFR BLD: 7.9 % (ref 4–15)
NEUTROPHILS # BLD AUTO: 4 K/UL (ref 1.8–7.7)
NEUTROPHILS NFR BLD: 77.3 % (ref 38–73)
NRBC BLD-RTO: 0 /100 WBC
OVALOCYTES BLD QL SMEAR: ABNORMAL
PHOSPHATE SERPL-MCNC: <1 MG/DL (ref 2.7–4.5)
PLATELET # BLD AUTO: 49 K/UL (ref 150–450)
PLATELET BLD QL SMEAR: ABNORMAL
PMV BLD AUTO: 10.2 FL (ref 9.2–12.9)
POIKILOCYTOSIS BLD QL SMEAR: SLIGHT
POLYCHROMASIA BLD QL SMEAR: ABNORMAL
POTASSIUM SERPL-SCNC: 3.9 MMOL/L (ref 3.5–5.1)
PROT SERPL-MCNC: 4.4 G/DL (ref 6–8.4)
PROTHROMBIN TIME: 22.1 SEC (ref 9–12.5)
RBC # BLD AUTO: 2.48 M/UL (ref 4–5.4)
SODIUM SERPL-SCNC: 144 MMOL/L (ref 136–145)
TOXIC GRANULES BLD QL SMEAR: PRESENT
WBC # BLD AUTO: 5.17 K/UL (ref 3.9–12.7)

## 2024-01-16 PROCEDURE — 80053 COMPREHEN METABOLIC PANEL: CPT | Performed by: STUDENT IN AN ORGANIZED HEALTH CARE EDUCATION/TRAINING PROGRAM

## 2024-01-16 PROCEDURE — 85610 PROTHROMBIN TIME: CPT | Performed by: STUDENT IN AN ORGANIZED HEALTH CARE EDUCATION/TRAINING PROGRAM

## 2024-01-16 PROCEDURE — 85025 COMPLETE CBC W/AUTO DIFF WBC: CPT | Performed by: STUDENT IN AN ORGANIZED HEALTH CARE EDUCATION/TRAINING PROGRAM

## 2024-01-16 PROCEDURE — 84100 ASSAY OF PHOSPHORUS: CPT | Performed by: STUDENT IN AN ORGANIZED HEALTH CARE EDUCATION/TRAINING PROGRAM

## 2024-01-16 PROCEDURE — 99223 1ST HOSP IP/OBS HIGH 75: CPT | Mod: ,,,

## 2024-01-16 PROCEDURE — 63600175 PHARM REV CODE 636 W HCPCS: Performed by: HOSPITALIST

## 2024-01-16 PROCEDURE — 92526 ORAL FUNCTION THERAPY: CPT

## 2024-01-16 PROCEDURE — C9113 INJ PANTOPRAZOLE SODIUM, VIA: HCPCS | Performed by: HOSPITALIST

## 2024-01-16 PROCEDURE — 25000003 PHARM REV CODE 250: Performed by: STUDENT IN AN ORGANIZED HEALTH CARE EDUCATION/TRAINING PROGRAM

## 2024-01-16 PROCEDURE — 36415 COLL VENOUS BLD VENIPUNCTURE: CPT | Performed by: STUDENT IN AN ORGANIZED HEALTH CARE EDUCATION/TRAINING PROGRAM

## 2024-01-16 PROCEDURE — 83735 ASSAY OF MAGNESIUM: CPT | Performed by: STUDENT IN AN ORGANIZED HEALTH CARE EDUCATION/TRAINING PROGRAM

## 2024-01-16 PROCEDURE — 20600001 HC STEP DOWN PRIVATE ROOM

## 2024-01-16 PROCEDURE — 25000003 PHARM REV CODE 250: Performed by: HOSPITALIST

## 2024-01-16 PROCEDURE — 99233 SBSQ HOSP IP/OBS HIGH 50: CPT | Mod: ,,,

## 2024-01-16 RX ADMIN — LACTULOSE 20 G: 20 SOLUTION ORAL at 08:01

## 2024-01-16 RX ADMIN — ONDANSETRON 4 MG: 2 INJECTION INTRAMUSCULAR; INTRAVENOUS at 09:01

## 2024-01-16 RX ADMIN — AMOXICILLIN AND CLAVULANATE POTASSIUM 875.2 MG: 400; 57 POWDER, FOR SUSPENSION ORAL at 09:01

## 2024-01-16 RX ADMIN — ACETAMINOPHEN 650 MG: 650 SOLUTION ORAL at 10:01

## 2024-01-16 RX ADMIN — AMOXICILLIN AND CLAVULANATE POTASSIUM 875.2 MG: 400; 57 POWDER, FOR SUSPENSION ORAL at 08:01

## 2024-01-16 RX ADMIN — THIAMINE HYDROCHLORIDE 100 MG: 100 INJECTION, SOLUTION INTRAMUSCULAR; INTRAVENOUS at 07:01

## 2024-01-16 RX ADMIN — RIFAXIMIN 550 MG: 550 TABLET ORAL at 08:01

## 2024-01-16 RX ADMIN — DEXTROSE MONOHYDRATE, SODIUM CHLORIDE, AND POTASSIUM CHLORIDE: 50; 9; 1.49 INJECTION, SOLUTION INTRAVENOUS at 02:01

## 2024-01-16 RX ADMIN — LEVETIRACETAM 1000 MG: 100 INJECTION INTRAVENOUS at 09:01

## 2024-01-16 RX ADMIN — FOLIC ACID 1 MG: 5 INJECTION, SOLUTION INTRAMUSCULAR; INTRAVENOUS; SUBCUTANEOUS at 08:01

## 2024-01-16 RX ADMIN — PANTOPRAZOLE SODIUM 40 MG: 40 INJECTION, POWDER, FOR SOLUTION INTRAVENOUS at 08:01

## 2024-01-16 RX ADMIN — RIFAXIMIN 550 MG: 550 TABLET ORAL at 09:01

## 2024-01-16 RX ADMIN — LEVETIRACETAM 1000 MG: 100 INJECTION INTRAVENOUS at 08:01

## 2024-01-16 RX ADMIN — OLANZAPINE 5 MG: 5 TABLET, ORALLY DISINTEGRATING ORAL at 09:01

## 2024-01-16 RX ADMIN — ALPRAZOLAM 0.25 MG: 0.25 TABLET ORAL at 05:01

## 2024-01-16 RX ADMIN — POTASSIUM PHOSPHATE, MONOBASIC AND POTASSIUM PHOSPHATE, DIBASIC 30 MMOL: 224; 236 INJECTION, SOLUTION, CONCENTRATE INTRAVENOUS at 03:01

## 2024-01-16 RX ADMIN — LITHIUM CARBONATE 300 MG: 300 TABLET, FILM COATED, EXTENDED RELEASE ORAL at 09:01

## 2024-01-16 NOTE — PROGRESS NOTES
[facility] - Transplant [facility]  Progress Note    Patient Name: Marely Hamilton  MRN: [id number]  Patient Class: IP- Inpatient   Admission Date: 1/4/2024  Length of Stay: 10 days  Attending Physician: Lenka Ortiz MD  Primary Care Provider: Viktor Ross MD        Subjective:     Principal Problem:Decompensated hepatic cirrhosis        HPI:  Patient is a 58-year-old woman with past medical history significant for alcoholic cirrhosis, bipolar disorder, chronic anemia, thrombocytopenia, cholelithiasis, DVT with IVC filter in place, prior GI bleeds, ESBL UTI history, seizure disorder presenting from [facility] for evaluation of abdominal distension, peripheral edema, and jaundice.     Patient denies fever, chills, or abdominal pain. Patient was recently admitted in early December for hepatic encephalopathy.   Patient denies any pain at this time. She reports she has been urinating frequently though difficult to obtain history.   She denies recent illness/fever/chills/nausea/vomiting/diarrhea/constipation.  Patient has not been taking lactulose. Reports compliance with psychiatric medications and A&Ox3 but reports she can't take the lactulose due to not tolerating the sun.    Hospital Course:   In the ED, vitals stable but hypoxic on room air to the 80s, which improved with supplemental oxygen.  Presentation consistent with decompensated liver failure, meld score 22.    She was sent in by her nursing home facility for mild abdominal distention as well as significant jaundice.    Chest x-ray revealed a moderate-to-large pleural effusion with compressive atelectasis. Likely from her ascites. Her abdomen is soft, nontender.      Intake labs remarkable for Na 142, BUN 6, Cr 0.4, Calcium 7.8, , Tbili 12.1, , ALT 41, Ammonia 114, lactic 1.0.     Will admit for decompensated cirrhosis. Continue on lactulose for treatment of HE, rocephin for SBP ppx. Hepatology and IR consulted for  thoracentesis and further recommendations.   Patient is oriented to person, place, time, and situation but with some delusions.     Overview/Hospital Course:  Palliative care has been helping coordinate with sister. She is now DNR/DNI.     Follow nutrition goals and recommendations however follow aspiration precautions.     Appreciate psychiatry recommendations. Continue lithium and and decreased zyprexa.     Will discuss with neurology for her Keppra.     Escalated antibiotic coverage from rocephin to zosyn due to aspiration overnight on 1/9/24. Diet changed to pureed.     Jose evaluating EEG to r/o seizures, asked if can wean keppra. Keppra level is pending.        Interval History:   1/15: More awake today. Went to bedside but family had already left.       Review of Systems   Unable to perform ROS: Mental status change   Constitutional:  Positive for appetite change. Negative for activity change and chills.   HENT:          Desquamation of lips   Respiratory:          Nasal canula in place   Gastrointestinal:         Incontinence of stools   Genitourinary:         Urinary incontinence   Skin:  Positive for color change.   Neurological:  Positive for tremors. Negative for speech difficulty.   Psychiatric/Behavioral:  Positive for confusion.      Objective:     Vital Signs (Most Recent):  Temp: 97.6 °F (36.4 °C) (01/15/24 1554)  Pulse: 86 (01/15/24 1554)  Resp: 18 (01/15/24 1554)  BP: 129/62 (01/15/24 1554)  SpO2: (!) 94 % (01/15/24 1554) Vital Signs (24h Range):  Temp:  [97.6 °F (36.4 °C)-98.5 °F (36.9 °C)] 97.6 °F (36.4 °C)  Pulse:  [68-86] 86  Resp:  [16-20] 18  SpO2:  [93 %-97 %] 94 %  BP: (106-137)/(52-76) 129/62     Weight: 58.1 kg (128 lb 1.4 oz)  Body mass index is 22.69 kg/m².    Intake/Output Summary (Last 24 hours) at 1/15/2024 1856  Last data filed at 1/15/2024 1800  Gross per 24 hour   Intake 2478.27 ml   Output 450 ml   Net 2028.27 ml         Physical Exam  Vitals reviewed.   Constitutional:        General: She is not in acute distress.     Appearance: She is cachectic. She is ill-appearing.   HENT:      Head: Normocephalic and atraumatic.      Mouth/Throat:      Mouth: Mucous membranes are dry.      Pharynx: No oropharyngeal exudate.      Comments: Desquamation of lips  Eyes:      General: Scleral icterus present.      Extraocular Movements: Extraocular movements intact.   Cardiovascular:      Rate and Rhythm: Normal rate and regular rhythm.      Heart sounds: Normal heart sounds. No murmur heard.  Pulmonary:      Effort: Pulmonary effort is normal. No respiratory distress.      Breath sounds: No wheezing.   Abdominal:      General: Bowel sounds are normal. There is no distension.      Palpations: Abdomen is soft.      Tenderness: There is no abdominal tenderness. There is no guarding.   Musculoskeletal:         General: No tenderness. Normal range of motion.      Cervical back: Normal range of motion and neck supple.   Lymphadenopathy:      Cervical: No cervical adenopathy.   Skin:     General: Skin is warm and dry.      Capillary Refill: Capillary refill takes less than 2 seconds.      Coloration: Skin is jaundiced.      Findings: No rash.   Neurological:      Mental Status: She is alert. She is disoriented.      Motor: Weakness: bilateral lower extremities.      Comments: Poor participation   Psychiatric:         Attention and Perception: She perceives visual hallucinations.         Behavior: Behavior is cooperative.         Thought Content: Thought content is delusional.             Significant Labs: All pertinent labs within the past 24 hours have been reviewed.    Significant Imaging: I have reviewed all pertinent imaging results/findings within the past 24 hours.    Assessment/Plan:      * Decompensated hepatic cirrhosis  Patient with known Cirrhosis with Child's class C. Co-morbidities are present and inclusive of ascites, malnutrition, anemia/pancytopenia, and Pleural effusion .  MELD-Na score  calculated; MELD 3.0: 32 at 1/10/2024  5:57 AM  MELD-Na: 27 at 1/10/2024  5:57 AM  Calculated from:  Serum Creatinine: 0.5 mg/dL (Using min of 1 mg/dL) at 1/10/2024  5:57 AM  Serum Sodium: 147 mmol/L (Using max of 137 mmol/L) at 1/10/2024  5:57 AM  Total Bilirubin: 18.1 mg/dL at 1/10/2024  5:57 AM  Serum Albumin: 1.8 g/dL at 1/10/2024  5:57 AM  INR(ratio): 2.4 at 1/10/2024  5:57 AM  Age at listing (hypothetical): 58 years  Sex: Female at 1/10/2024  5:57 AM      Continue chronic meds. Etiology likely ETOH. Will avoid any hepatotoxic meds, and monitor CBC/CMP/INR for synthetic function.     Presenting with decompensation (increased ascites, Bili 12, ammonia 114).  Status post thoracentesis of 650 mL.  Awaiting cultures.  Could not find a pocket for paracentesis    -restart lactulose as she has been noncompliant  Appreciate hepatology recommendations.      Recommendations to include physical therapy occupational therapy, palliative care discussions for goals of care.  She is not a candidate for transplantation given frailty, poor social support and noncompliance with medication    Aspiration of food  Event on 1/9.   SLP monitoring and diet changed from minced to pureed 1/10      Acute hypoxic respiratory failure  Patient with Hypoxic Respiratory failure which is Acute on chronic.  she is not on home oxygen. Supplemental oxygen was provided and noted improvement in her O2 sats.    -chest x-ray with right-sided pleural effusion.  Status post thoracentesis and 650 mL of fluid drained.  Awaiting cultures.  Oxygen requirements decreased to 2 L at this time.  Continue to monitor    Now with aspiration PNA. Conitnue zosyn   SLP eval and aspiration precautions.     Presence of IVC filter  Chronic IVC filter as she is at risk for bleeding and had history of DVTs      Goals of care, counseling/discussion  Appreciate palliative care facilitation with goals of care conversation.  Patient is now DNR and other family meeting  scheduled for tomorrow.    I also called and spoke to sister Zaria and she is on board and wants us to continue treating her for encephalopathy and infection and continue psychiatric medications.    Palliative care encounter  Appreciate palliative care recommendations and meeting with family that is her sister.  Family in agreement to make her DNR.  Documentation in place.  Code status updated    Another meeting with sister by palliative. More meetings needed, sister to come her pablo Saturday from [place]      Aspiration pneumonia of right lung  Aspiration even AM of 1/9  Evaluated by SLP, diet changed to minced and now pureed 1/10.   Started Zosyn 1/9        Acute metabolic encephalopathy  2/2 cirrhosis - non compliant with Lactulose, continue lactulose.   Polypharmacy - adjusted medications. Decreased Zyprexa.   SBP could not be ruled out as no pocket for ascites, now has aspiration and R sided on CXR 1/9, escalate Abx to zosyn     Poor nutrition. Added MVI. Ensure adequate nutrition   Severe debility     Seizure history, on keppra BID, asked neurology to weigh in and if EEG needed or if adjustment to keppra dose.     Palliative care facilitating family meetings. Sister to arrive from [place] on saturday          Hypernatremia  Patient has hypernatremia which is controlled. The hypernatremia is due to  Poor PO intake and Lasix . We will aim to correct the sodium by 8-10mEq in 24 hours. We will correct their hypernatremia with Select IV fluids: 5% albumin 12.5gms . The patient's sodium results have been reviewed and are listed below.  Recent Labs   Lab 01/10/24  0557   *     Will check Urine lytes    Bipolar I disorder, most recent episode depressed  Continue home regimen: lithium, olanzapine    Appreciate psychiatry recommendations we will decrease Zyprexa to 2.5mg.      Seizure  History of documented seizures since 2015.  Most recent encounter in June 2023 where she was on 1 g b.i.d. of Keppra.  Continued on Keppra  1 g b.i.d..  Can consider checking an EEG ensure no seizures if she stays confused and verify with Neurology.  Pending Keppra level      Severe malnutrition  Nutrition consulted. Most recent weight and BMI monitored-     Measurements:  Wt Readings from Last 1 Encounters:   01/10/24 58.1 kg (128 lb 1.4 oz)   Body mass index is 22.69 kg/m².    Patient has been screened and assessed by RD.    Malnutrition Type:  Context: chronic illness  Level: severe    Malnutrition Characteristic Summary:  Subcutaneous Fat (Malnutrition):  (moderate to severe)  Muscle Mass (Malnutrition):  (moderate to severe)  Hand  Strength, Left (Malnutrition): reduced  Hand  Strength, Right (Malnutrition): reduced    Interventions/Recommendations (treatment strategy):  1.) Recommend continuing Low Na diet, fluid per MD. 2.) Recommend Boost Plus (or Boost equivalent) BID to help optimize PRO/Kcal intake (32% of the FR). 3.) Continue to provide folic acid and thiamine- consider adding MVI. RD to monitor PO intake, skin, labs, wt.        Macrocytic anemia  Patient's anemia is currently controlled. Has not received any PRBCs to date. Etiology likely d/t chronic disease due to Chronic liver disease  Current CBC reviewed-   Lab Results   Component Value Date    HGB 8.0 (L) 01/10/2024    HCT 25.9 (L) 01/10/2024     Monitor serial CBC and transfuse if patient becomes hemodynamically unstable, symptomatic or H/H drops below 7.    She is on chronic thiamine and folate.  We will add multivitamin    Ascites  Mild ascites seen on imaging however no pocket seen on attempt for paracentesis on 1/8/24        VTE Risk Mitigation (From admission, onward)           Ordered     Reason for No Pharmacological VTE Prophylaxis  Once        Question:  Reasons:  Answer:  Risk of Bleeding    01/05/24 0317     IP VTE HIGH RISK PATIENT  Once         01/05/24 0317     Place sequential compression device  Until discontinued         01/05/24 0317                     Discharge Planning   BRAYAN: 1/17/2024     Code Status: DNR   Is the patient medically ready for discharge?: No    Reason for patient still in hospital (select all that apply): Patient trending condition  Discharge Plan A: Return to nursing home                  Lenka Ortiz MD  Department of Hospital Medicine   [facility] - Transplant Stepdown

## 2024-01-16 NOTE — NURSING
Called in room by patient. Pt asking what is going on. She knows she is at hospital but confused as to what is going on. Reviewed plan of care with her-paracentesis and drain placement tomorrow, and going back to nursing home with hospice soon. Pt kept asking if she was going to die tonight and expressed anxiety. She said she felt SOB. O2 sats 95% on RA. 2L of oxygen provided for comfort. Also given xanax at this time for anxiety. Curtains opened.

## 2024-01-16 NOTE — PT/OT/SLP PROGRESS
"Speech Language Pathology Treatment    Patient Name:  Marely Hamilton   MRN:  [id number]  Admitting Diagnosis: Decompensated hepatic cirrhosis    Recommendations:                 General Recommendations:  Dysphagia therapy  Diet recommendations:  Minced & Moist Diet - IDDSI Level 5, Liquid Diet Level: Thin liquids - IDDSI Level 0   Consider oral nutritional supplements such as Boost/Ensure as medical appropriate as pt prefers to drink liquids vs eat solids  Aspiration Precautions: Eliminate distractions, Feed only when awake/alert, Frequent oral care, HOB to 90 degrees, Meds crushed in puree, Meds whole buried in puree, Remain upright 30 minutes post meal, Small bites/sips, and Standard aspiration precautions   General Precautions: Standard, aspiration, fall  Communication strategies:  provide increased time to answer and go to room if call light pushed    Assessment:     Marely Hamilton is a 58 y.o. female with an SLP diagnosis of  dysphagia . She presents with improved swallow function and tolerance of oral trials this date.    Subjective     Spoke with nursing prior to session. Pt found resting in bed upon SLP entry into room.    Patient goals: "Okay"    Pain/Comfort:  Pain Rating 1: 0/10    Respiratory Status: Room air    Objective:     Has the patient been evaluated by SLP for swallowing?   Yes  Keep patient NPO? No   Current Respiratory Status:    room air    Pt seen for ongoing assessment of swallow function. Pt lethargic this date though able to answer basic questions and was agreeable to oral intake of breakfast tray. HOB elevated for all PO intake. Bites of minced and moist eggs and oatmeal completed with increased mastication and AP bolus transport time. MIN oral residue noted across majority of soft solid trials though cleared with liquid wash. Pt consumed multiple, cyclical straw sips of thin liquids amounting to ~12 oz total without evidence of airway compromise. Given ongoing oral phase deficits " impacting swallow efficacy, will opt for continuation of soft solids. SLP provided education regarding overall impressions and ongoing SLP POC. Pt verbalized understanding but would continue to benefit from ongoing education.      Goals:   Multidisciplinary Problems       SLP Goals          Problem: SLP    Goal Priority Disciplines Outcome   SLP Goal     SLP Ongoing, Progressing   Description: Speech Language Pathology Goals  Goals expected to be met by 1/23    1. Pt will tolerate least restrictive PO diet without any overt s/sx of airway compromise.                                Plan:     Patient to be seen:  3 x/week   Plan of Care expires:  02/08/24  Plan of Care reviewed with:  patient   SLP Follow-Up:  Yes       Discharge recommendations:   (TBD)   Barriers to Discharge:  Level of Skilled Assistance Needed      Time Tracking:     SLP Treatment Date:   01/16/24  Speech Start Time:  0903  Speech Stop Time:  0914     Speech Total Time (min):  11 min    Billable Minutes: Treatment Swallowing Dysfunction 11      01/16/2024

## 2024-01-16 NOTE — PROGRESS NOTES
[facility] - Transplant Stepdown  Palliative Medicine  Progress Note    Patient Name: Marely Hamilton  MRN: [id number]  Admission Date: 1/4/2024  Hospital Length of Stay: 11 days  Code Status: DNR   Attending Provider: Lenka Ortiz MD  Consulting Provider: MIKA Ponce  Primary Care Physician: Viktor Ross MD  Principal Problem:Decompensated hepatic cirrhosis    Patient information was obtained from patient, relative(s), and primary team.      Assessment/Plan:     Palliative Care  Palliative care encounter  Impression: Marely Hamilton is a 59 y/o female with multiple hospitalizations over the past year who presents from the NH in which she resides. Pt with history of alcoholic cirrhosis, bipolar disorder, chronic anemia, thrombocytopenia, cholelithiasis, DVT with IVC filter in place, prior GI bleeds, ESBL UTI history, seizure disorder. Per chart review pt has been declined for a liver Tx twice for frailty, non-compliance, an combordidities. This morning she is jaundiced and awake, oriented only to person. She does state that Zaria (sister) is who to contact to make decisions. She does not appear to be in any acute distress and does not show any s/s of pain or labored breathing. She denies pain.     Reason for Consult: Per communication with Dr. Chavez, MALI and ACP needed.     Advance Care Planning  Code Status  In light of the patients advanced and life limiting illness,I engaged the the family in a voluntary conversation about the patient's preferences for care  at the very end of life. The patient wishes to have a natural, peaceful death.  Along those lines, the patient does not wish to have CPR or other invasive treatments performed when her heart and/or breathing stops. I communicated to the family that a DNR order would be placed in her medical record to reflect this preference.  I spent a total of 25 minutes engaging the patient in this advance care planning discussion.        GOC:1/16/24 This  "morning I was able to speak to pt at b/s with sister (Zaria) on the phone. Pt is AAOx3 this am and says "I know my liver is not working and we can't fix it." During this lucid period we spoke about what she wants. Pt told sister "I do not want to be in hospital." Pt verbalizes wanting to leave hospital and go to NH with hospice. She verbalizes understanding that she is dying and does not want to continue coming to hospital. Sister (Zaria) on speaker phone for entire conversation and agrees that she will follow her sisters wishes. Sister spoke with Dr. Ortiz this morning re/a Pleurx drain to manage ascites at NH, and she would like this done to provide comfort before d/c. I went over LAPOST form and importance of clear goals before d/c. Pt is unable to sign, I filled form online and sent it to sister electronically, where she was able to complete signature portion. CM notified of pt/sister desire for hospice information.       1/11/24: Spoke with Zaria today, she is planning on coming Sunday afternoon. Discussed current issues swallowing, negative seizures on EEG, psych consult to assure appropriate tx of mental health dz, and continued overall decline despite therapies/treatments. Understands that hospice is a very likely option. Sister reports that pt still has room available to her at [facility] if needed but continues to work on approval elsewhere.     1/9/24: Per my conversation/plan with sister Zaria, yesterday, I called her from the bedside this am with pt. present so that we could all be present for conversation. This morning pt is awake and oriented to person and intermittently oriented to time and place throughout our conversation. Zaria stressed to pt that she wanted to follow her wishes and asked pt if she was ok with DNR status. Pt replied "yes".   We discussed that pt continues to have intermittent periods of orientation, including during my time this morning. I reiterated to pt and sister " "that there are no options left for treatment of liver cirrhosis, which will continue to to manifest as progressive decline, leading to end of life. Dr. Chavez also spoke with sister yesterday explaining the same. Sister remains hopeful that pt will "pull thru again, like she has so many times before". Sister understands that pt is not able to move to [place] to live near her and is looking into other placement, as pt did not like [facility]. Sister is planning to visit this weekend to make further arrangements.     1/8/24: As pt was only oriented to self upon my visit this am, I called and spoke with sister Zaria ([phone]) who lives in [facility]. We discussed her advanced liver disease and lack of candidacy for transplant. Sister was surprised to hear this, and I have asked primary team to reach out for prognostication. I did explain liver disease and continued progression without transplant availability. Zaria did [place], but we discussed travel is unlikely at this time. Zaria shared that she wishes to fulfill her sisters wishes in any way possible and therefore agreed to DNR status, per conversation MD had with pt over the weekend. I notified Dr. Chavez of sisters request to place DNR. Zaria has been unable to talk to her sister and we arranged a time to make this phone call that works with her schedule tomorrow morning.     MPOA: Per chart review, pt has requested that Zaria be her decision maker if she could not. Zaria state that she has MPOA, copy to be placed in chart.     Symptoms   Pt denies pain.   No other symptoms noted at this time.     Recommendations  -Continue to monitor for s/s of pain/ discomfort  - Pleurx drain  - CM to aid in d/c and hospice referrals         I will follow-up with patient. Please contact us if you have any additional questions.    Subjective:     Chief Complaint:   Chief Complaint   Patient presents with    Ascites     From [facility] for abd distention, BLE swelling, and jaudice. Hx of cirrhosis.        HPI:   HPI: Per Chart Review: Patient is a 58-year-old woman with past medical history significant for alcoholic cirrhosis, bipolar disorder, chronic anemia, thrombocytopenia, cholelithiasis, DVT with IVC filter in place, prior GI bleeds, ESBL UTI history, seizure disorder presenting from [facility] for evaluation of abdominal distension, peripheral edema, and jaundice.      Patient denies fever, chills, or abdominal pain. Patient was recently admitted in early December for hepatic encephalopathy.   Patient denies any pain at this time. She reports she has been urinating frequently though difficult to obtain history.   She denies recent illness/fever/chills/nausea/vomiting/diarrhea/constipation.  Patient has not been taking lactulose. Reports compliance with psychiatric medications and A&Ox3 but reports she can't take the lactulose due to not tolerating the sun.    Hospital Course:   In the ED, vitals stable but hypoxic on room air to the 80s, which improved with supplemental oxygen.  Presentation consistent with decompensated liver failure, meld score 22.    She was sent in by her nursing home facility for mild abdominal distention as well as significant jaundice.    Chest x-ray revealed a moderate-to-large pleural effusion with compressive atelectasis. Likely from her ascites. Her abdomen is soft, nontender.       Intake labs remarkable for Na 142, BUN 6, Cr 0.4, Calcium 7.8, , Tbili 12.1, , ALT 41, Ammonia 114, lactic 1.0.      Will admit for decompensated cirrhosis. Continue on lactulose for treatment of HE, rocephin for SBP ppx. Hepatology and IR consulted for thoracentesis and further recommendations.   Patient is oriented to person, place, time, and situation but with some delusions.        Hospital Course:  No notes on file      Medications:  Continuous Infusions:   dextrose 5 % and 0.9 % NaCl with  KCl 20 mEq 125 mL/hr at 01/15/24 1334     Scheduled Meds:   amoxicillin-clavulanate  875.2 mg Oral Q12H    folic acid (FOLVITE) IVPB  1 mg Intravenous Daily    lactulose  20 g Oral TID    levETIRAcetam (Keppra) IV (PEDS and ADULTS)  1,000 mg Intravenous Q12H    lithium  300 mg Oral QHS    OLANZapine zydis  5 mg Oral QHS    pantoprazole  40 mg Intravenous Daily    rifAXIMin  550 mg Oral BID    thiamine (B-1) 100 mg in dextrose 5 % (D5W) 100 mL IVPB  100 mg Intravenous Daily     PRN Meds:acetaminophen, ALPRAZolam, glucagon (human recombinant), glucose, glucose, insulin aspart U-100, ondansetron, prochlorperazine, sodium chloride 0.9%    Objective:     Vital Signs (Most Recent):  Temp: 98.6 °F (37 °C) (01/16/24 1124)  Pulse: 84 (01/16/24 1124)  Resp: 20 (01/16/24 1124)  BP: (!) 154/67 (01/16/24 1124)  SpO2: (!) 94 % (01/16/24 1124) Vital Signs (24h Range):  Temp:  [97.6 °F (36.4 °C)-98.6 °F (37 °C)] 98.6 °F (37 °C)  Pulse:  [77-86] 84  Resp:  [16-20] 20  SpO2:  [93 %-95 %] 94 %  BP: (111-154)/(57-67) 154/67     Weight: 58.1 kg (128 lb 1.4 oz)  Body mass index is 22.69 kg/m².       Physical Exam  Constitutional:       Appearance: She is ill-appearing.   Abdominal:      General: There is distension.   Skin:     Coloration: Skin is jaundiced.   Neurological:      Mental Status: She is alert.      Comments: Awake, oriented to person only.             Review of Symptoms      Symptom Assessment (ESAS 0-10 Scale)  Pain:  0  Dyspnea:  0  Anxiety:  0  Nausea:  0  Depression:  0  Anorexia:  0  Fatigue:  0  Insomnia:  0  Restlessness:  0  Agitation:  0 due to Acuity of condition         Pain Assessment in Advanced Demential Scale (PAINAD)   Breathing - Independent of vocalization:  0  Negative vocalization:  0  Facial expression:  0  Body language:  0  Consolability:  0  Total:  0    Performance Status:  50    Living Arrangements:  Lives in nursing home    Psychosocial/Cultural:   See Palliative Psychosocial Note: Yes  Lives in  [facility] after ICU stay in 2023. Sister is decision maker who lives in [facility].   **Primary  to Follow**  Palliative Care  Consult: Yes        Advance Care Planning  Advance Directives:   Living Will: No    LaPOST: No    Do Not Resuscitate Status: Yes    Medical Power of : Documentation in chart states that pt wishes her sister Zaria to make decisions..      Decision Making:  Family answered questions and Patient unable to communicate due to disease severity/cognitive impairment  Goals of Care: What is most important right now is to focus on symptom/pain control. Accordingly, we have decided that the best plan to meet the patient's goals includes unclear from the sister's perspective..         Significant Labs: CBC:   Recent Labs   Lab 01/15/24  0611 01/16/24 0615   WBC 3.87* 5.17   HGB 9.4* 9.4*   HCT 29.3* 29.4*   PLT 45* 49*       CMP:   Recent Labs   Lab 01/15/24  0611 01/16/24 0615    144   K 3.3* 3.9   * 124*   CO2 18* 16*   * 211*   BUN 4* 3*   CREATININE 0.4* 0.4*   CALCIUM 7.7* 7.4*   PROT 4.5* 4.4*   ALBUMIN 1.6* 1.6*   BILITOT 21.4* 23.6*   ALKPHOS 173* 175*   AST 48* 42*   ALT 26 24   ANIONGAP 3* 4*       CBC:   Recent Labs   Lab 01/16/24 0615   WBC 5.17   HGB 9.4*   HCT 29.4*   *   PLT 49*       BMP:  Recent Labs   Lab 01/16/24 0615   *      K 3.9   *   CO2 16*   BUN 3*   CREATININE 0.4*   CALCIUM 7.4*   MG 1.4*       LFT:  Lab Results   Component Value Date    AST 42 (H) 01/16/2024    GGT 33 06/17/2023    ALKPHOS 175 (H) 01/16/2024    BILITOT 23.6 (H) 01/16/2024     Albumin:   Albumin   Date Value Ref Range Status   01/16/2024 1.6 (L) 3.5 - 5.2 g/dL Final     Protein:   Total Protein   Date Value Ref Range Status   01/16/2024 4.4 (L) 6.0 - 8.4 g/dL Final     Lactic acid:   Lab Results   Component Value Date    LACTATE 1.0 01/05/2024    LACTATE 2.3 (H) 11/29/2023       Significant Imaging: I have reviewed all  pertinent imaging results/findings within the past 24 hours.    > 50% of  55 min visit spent in chart review, face to face discussion of goals of care,  symptom assessment, coordination of care and emotional support   Catalina Brunner, CNS  Palliative Medicine  [facility] - Transplant Stepdown

## 2024-01-16 NOTE — CONSULTS
Abdominal PleurX Placement Consult Note  Interventional Radiology    Inpatient consult to Interventional Radiology  Consult performed by: Hilary Michaud PA-C  Consult ordered by: Lenka Ortiz MD          SUBJECTIVE:     Chief Complaint:  cirrhosis, ascites, transitioned to hospice    History of Present Illness:  Marely Hamilton is a 58 y.o. female with a PMHx of alcoholic cirrhosis, bipolar disorder, chronic anemia, thrombocytopenia, cholelithiasis, DVT with IVC filter in place  who was admitted on 1/4/24 for cirrhosis. Interventional Radiology has been consulted for abdominal pleurx placement for management of ascites. She had an ultrasound done on 1/8/24 which revealed ascites. Has not had a paracentesis since the ultrasound.     Review of Systems   Constitutional: Negative.    HENT: Negative.     Respiratory: Negative.     Cardiovascular: Negative.    Gastrointestinal: Negative.    Musculoskeletal: Negative.    Skin: Negative.    Neurological: Negative.    Psychiatric/Behavioral: Negative.          Scheduled Meds:   amoxicillin-clavulanate  875.2 mg Oral Q12H    folic acid (FOLVITE) IVPB  1 mg Intravenous Daily    lactulose  20 g Oral TID    levETIRAcetam (Keppra) IV (PEDS and ADULTS)  1,000 mg Intravenous Q12H    lithium  300 mg Oral QHS    OLANZapine zydis  5 mg Oral QHS    pantoprazole  40 mg Intravenous Daily    potassium phosphate IVPB  30 mmol Intravenous Once    rifAXIMin  550 mg Oral BID    thiamine (B-1) 100 mg in dextrose 5 % (D5W) 100 mL IVPB  100 mg Intravenous Daily     Continuous Infusions:   dextrose 5 % and 0.9 % NaCl with KCl 20 mEq 125 mL/hr at 01/16/24 1409     PRN Meds:acetaminophen, ALPRAZolam, glucagon (human recombinant), glucose, glucose, insulin aspart U-100, ondansetron, prochlorperazine, sodium chloride 0.9%    Review of patient's allergies indicates:   Allergen Reactions    Sulfa (sulfonamide antibiotics) Rash    Codeine Nausea And Vomiting       Past Medical History:    Diagnosis Date    Addiction to drug     Alcohol abuse     Alcohol abuse, in remission 6/15/2015    14.5 weeks ago; AA weekly    Anemia     Anxiety 6/15/2015    Behavioral problem     Bipolar disorder     Bipolar disorder in remission 6/15/2015    Cirrhosis, Laennec's 6/15/2015    Depression     Encounter for blood transfusion     Epistaxis 6/15/2015    Fatigue     Glaucoma     Hematuria     Hepatic encephalopathy 6/15/2015    Hepatic enlargement     History of psychiatric care     History of psychiatric hospitalization     History of seizure 6/15/2015    1    hx of intentional Tylenol overdose 6/15/2015    2005- situational and hx of bipolar    Hx of psychiatric care     Macrocytic anemia 9/18/2015    6 units PRBC since June 2015    Jeana     Osteoarthritis     Other ascites 6/15/2015    Psychiatric exam requested by authority     Psychiatric problem     Psychosis 9/26/2019    Renal disorder     Seizures     Self-harming behavior     Suicide attempt     Therapy     Thrombocytopenia 6/15/2015     Past Surgical History:   Procedure Laterality Date    COSMETIC SURGERY      EMBOLIZATION N/A 6/11/2023    Procedure: EMBOLIZATION, BLOOD VESSEL;  Surgeon: Debbie Surgeon;  Location: [facility];  Service: Anesthesiology;  Laterality: N/A;    ESOPHAGOGASTRODUODENOSCOPY      ESOPHAGOGASTRODUODENOSCOPY N/A 7/6/2023    Procedure: EGD (ESOPHAGOGASTRODUODENOSCOPY);  Surgeon: Bernice Guillen MD;  Location: [facility] ([address] [place] [address]);  Service: Endoscopy;  Laterality: N/A;    ESOPHAGOGASTRODUODENOSCOPY N/A 7/11/2023    Procedure: EGD (ESOPHAGOGASTRODUODENOSCOPY);  Surgeon: Rangel Broussard MD;  Location: [facility] ([address] [place] [address]);  Service: Endoscopy;  Laterality: N/A;     Family History   Problem Relation Age of Onset    Alcohol abuse Father     Suicide Father     Bipolar disorder Father     Alcohol abuse Sister     Hypertension Mother     Alcohol abuse Paternal Grandfather     Drug abuse Neg Hx     Dementia Neg Hx      Social History      Tobacco Use    Smoking status: Never    Smokeless tobacco: Never   Substance Use Topics    Alcohol use: Not Currently     Comment: hx of ETOH abuse with cirrhosis of liver    Drug use: No         Anticoagulants/Antiplatelets:   No anticoagulation    OBJECTIVE:     Vital Signs (Most Recent)  Temp: 98.6 °F (37 °C) (01/16/24 1124)  Pulse: 84 (01/16/24 1124)  Resp: 20 (01/16/24 1124)  BP: (!) 154/67 (01/16/24 1124)  SpO2: (!) 94 % (01/16/24 1124)    Physical Exam:   Physical Exam  Constitutional:       Comments: Chronically ill appearing, thin   HENT:      Head: Normocephalic.   Eyes:      General: Scleral icterus present.   Cardiovascular:      Rate and Rhythm: Normal rate.   Pulmonary:      Effort: Pulmonary effort is normal.   Abdominal:      General: There is distension.   Skin:     Coloration: Skin is jaundiced.   Neurological:      Mental Status: Mental status is at baseline.         Laboratory  Lab Results   Component Value Date    INR 2.2 (H) 01/16/2024       Lab Results   Component Value Date    WBC 5.17 01/16/2024    HGB 9.4 (L) 01/16/2024    HCT 29.4 (L) 01/16/2024     (H) 01/16/2024    PLT 49 (L) 01/16/2024      Lab Results   Component Value Date     (H) 01/16/2024     01/16/2024    K 3.9 01/16/2024     (H) 01/16/2024    CO2 16 (L) 01/16/2024    BUN 3 (L) 01/16/2024    CREATININE 0.4 (L) 01/16/2024    CALCIUM 7.4 (L) 01/16/2024    MG 1.4 (L) 01/16/2024    ALT 24 01/16/2024    AST 42 (H) 01/16/2024    ALBUMIN 1.6 (L) 01/16/2024    BILITOT 23.6 (H) 01/16/2024    BILIDIR 7.4 (H) 01/05/2024       ASA/Mallampati  ASA: 3  Mallampati: 3      ASSESSMENT/PLAN:     Assessment:  58 y.o. female with a PMHx of alcoholic cirrhosis, bipolar disorder, chronic anemia, thrombocytopenia, cholelithiasis, DVT with IVC filter in place who has been referred to IR for an abdominal pleurx placement for management of ascites. Pt transitioned to hospice. jacquelyn discussed with ordering team    Plan:  Will  proceed with image guided abdominal pleurx placement on 1/17/24.   Sedation plan - up to moderate  Please keep pt NPO starting at midnight on day of procedure.   Anticoagulation history reviewed.   Coagulation labs reviewed.  Thank you for the consult. IR will continue to follow. Please contact with questions via TBS secure chat.    Hilary Michaud PA-C  Interventional Radiology  1/16/2024

## 2024-01-16 NOTE — ASSESSMENT & PLAN NOTE
"Impression: Marely Hamilton is a 57 y/o female with multiple hospitalizations over the past year who presents from the NH in which she resides. Pt with history of alcoholic cirrhosis, bipolar disorder, chronic anemia, thrombocytopenia, cholelithiasis, DVT with IVC filter in place, prior GI bleeds, ESBL UTI history, seizure disorder. Per chart review pt has been declined for a liver Tx twice for frailty, non-compliance, an combordidities. This morning she is jaundiced and awake, oriented only to person. She does state that Zaria (sister) is who to contact to make decisions. She does not appear to be in any acute distress and does not show any s/s of pain or labored breathing. She denies pain.     Reason for Consult: Per communication with Dr. Chavez, GO and ACP needed.     Advance Care Planning   Code Status  In light of the patients advanced and life limiting illness,I engaged the the family in a voluntary conversation about the patient's preferences for care  at the very end of life. The patient wishes to have a natural, peaceful death.  Along those lines, the patient does not wish to have CPR or other invasive treatments performed when her heart and/or breathing stops. I communicated to the family that a DNR order would be placed in her medical record to reflect this preference.  I spent a total of 25 minutes engaging the patient in this advance care planning discussion.        GOC:1/16/24 This morning I was able to speak to pt at b/s with sister (Zaria) on the phone. Pt is AAOx3 this am and says "I know my liver is not working and we can't fix it." During this lucid period we spoke about what she wants. Pt told sister "I do not want to be in hospital." Pt verbalizes wanting to leave hospital and go to NH with hospice. She verbalizes understanding that she is dying and does not want to continue coming to hospital. Sister (Zaria) on speaker phone for entire conversation and agrees that she will follow her " "sisters wishes. Sister spoke with Dr. Ortiz this morning re/a Pleurx drain to manage ascites at NH, and she would like this done to provide comfort before d/c. I went over LAPOST form and importance of clear goals before d/c. Pt is unable to sign, I filled form online and sent it to sister electronically, where she was able to complete signature portion. CM notified of pt/sister desire for hospice information.       1/11/24: Spoke with Zaria today, she is planning on coming Sunday afternoon. Discussed current issues swallowing, negative seizures on EEG, psych consult to assure appropriate tx of mental health dz, and continued overall decline despite therapies/treatments. Understands that hospice is a very likely option. Sister reports that pt still has room available to her at [facility] if needed but continues to work on approval elsewhere.     1/9/24: Per my conversation/plan with sister ,Zaria, yesterday, I called her from the bedside this am with pt. present so that we could all be present for conversation. This morning pt is awake and oriented to person and intermittently oriented to time and place throughout our conversation. Zaria stressed to pt that she wanted to follow her wishes and asked pt if she was ok with DNR status. Pt replied "yes".   We discussed that pt continues to have intermittent periods of orientation, including during my time this morning. I reiterated to pt and sister that there are no options left for treatment of liver cirrhosis, which will continue to to manifest as progressive decline, leading to end of life. Dr. Chavez also spoke with sister yesterday explaining the same. Sister remains hopeful that pt will "pull thru again, like she has so many times before". Sister understands that pt is not able to move to [place] to live near her and is looking into other placement, as pt did not like [facility]. Sister is planning to visit this weekend to make further arrangements.     1/8/24: " As pt was only oriented to self upon my visit this am, I called and spoke with sister Zaria ([phone]) who lives in [facility]. We discussed her advanced liver disease and lack of candidacy for transplant. Sister was surprised to hear this, and I have asked primary team to reach out for prognostication. I did explain liver disease and continued progression without transplant availability. Zaria did hope [place], but we discussed travel is unlikely at this time. Zaria shared that she wishes to fulfill her sisters wishes in any way possible and therefore agreed to DNR status, per conversation MD had with pt over the weekend. I notified Dr. Chavez of sisters request to place DNR. Zaria has been unable to talk to her sister and we arranged a time to make this phone call that works with her schedule tomorrow morning.     MPOA: Per chart review, pt has requested that Zaria be her decision maker if she could not. Zaria state that she has MPOA, copy to be placed in chart.     Symptoms   Pt denies pain.   No other symptoms noted at this time.     Recommendations  -Continue to monitor for s/s of pain/ discomfort  - Pleurx drain  - CM to aid in d/c and hospice referrals

## 2024-01-16 NOTE — PLAN OF CARE
[facility]  Discharge Reassessment    Primary Care Provider: Viktor Ross MD    Expected Discharge Date: 1/18/2024    Reassessment (most recent)       Discharge Reassessment - 01/16/24 1219          Discharge Reassessment    Assessment Type Discharge Planning Reassessment     Did the patient's condition or plan change since previous assessment? No     Discharge Plan discussed with: Patient     Communicated BRAYAN with patient/caregiver Yes     Discharge Plan A Return to nursing home;Hospice/home     Discharge Plan B Return to Nursing Home;Hospice/home     Transition of Care Barriers None     Why the patient remains in the hospital Requires continued medical care                   Spoke with patients sister via telephone. Patient sister is agreeable with discharge to [facility] with hospice after pleurx drain placement.      Awaiting Pleurx drain placement.     [facility] will be patients hospice agency at [facility].     [facility] has a 3pm cut off time for accepting patients each day.

## 2024-01-16 NOTE — SUBJECTIVE & OBJECTIVE
Interval History:   1/16: Patient wants hospice    Review of Systems   Unable to perform ROS: Mental status change   Constitutional:  Positive for appetite change. Negative for activity change and chills.   HENT:          Desquamation of lips   Respiratory:          Nasal canula in place   Gastrointestinal:         Incontinence of stools   Genitourinary:         Urinary incontinence   Skin:  Positive for color change.   Neurological:  Positive for tremors. Negative for speech difficulty.   Psychiatric/Behavioral:  Positive for confusion.      Objective:     Vital Signs (Most Recent):  Temp: 99 °F (37.2 °C) (01/16/24 1512)  Pulse: 88 (01/16/24 1512)  Resp: 19 (01/16/24 1512)  BP: (!) 120/59 (01/16/24 1512)  SpO2: 95 % (01/16/24 1512) Vital Signs (24h Range):  Temp:  [97.7 °F (36.5 °C)-99 °F (37.2 °C)] 99 °F (37.2 °C)  Pulse:  [77-88] 88  Resp:  [16-20] 19  SpO2:  [93 %-95 %] 95 %  BP: (111-154)/(57-67) 120/59     Weight: 58.1 kg (128 lb 1.4 oz)  Body mass index is 22.69 kg/m².    Intake/Output Summary (Last 24 hours) at 1/16/2024 1734  Last data filed at 1/16/2024 1400  Gross per 24 hour   Intake 4623.05 ml   Output 100 ml   Net 4523.05 ml         Physical Exam  Vitals reviewed.   Constitutional:       General: She is not in acute distress.     Appearance: She is cachectic. She is ill-appearing.   HENT:      Head: Normocephalic and atraumatic.      Mouth/Throat:      Mouth: Mucous membranes are dry.      Pharynx: No oropharyngeal exudate.      Comments: Desquamation of lips  Eyes:      General: Scleral icterus present.      Extraocular Movements: Extraocular movements intact.   Cardiovascular:      Rate and Rhythm: Normal rate and regular rhythm.      Heart sounds: Normal heart sounds. No murmur heard.  Pulmonary:      Effort: Pulmonary effort is normal. No respiratory distress.      Breath sounds: No wheezing.   Abdominal:      General: Bowel sounds are normal. There is no distension.      Palpations: Abdomen is  soft.      Tenderness: There is no abdominal tenderness. There is no guarding.   Musculoskeletal:         General: No tenderness. Normal range of motion.      Cervical back: Normal range of motion and neck supple.   Lymphadenopathy:      Cervical: No cervical adenopathy.   Skin:     General: Skin is warm and dry.      Capillary Refill: Capillary refill takes less than 2 seconds.      Coloration: Skin is jaundiced.      Findings: No rash.   Neurological:      Mental Status: She is alert. She is disoriented.      Motor: Weakness: bilateral lower extremities.      Comments: Poor participation   Psychiatric:         Attention and Perception: She perceives visual hallucinations.         Behavior: Behavior is cooperative.         Thought Content: Thought content is delusional.             Significant Labs: All pertinent labs within the past 24 hours have been reviewed.    Significant Imaging: I have reviewed all pertinent imaging results/findings within the past 24 hours.

## 2024-01-16 NOTE — PLAN OF CARE
Pt aaox4 abd vswnl and no c/o pain. Bed in low position and callbell within reach. Pt incontinent of stool and urine. Purwic removed bc of loose stools. Pt turned frequently. Barrier cream applied. Slin intact to buttocks. D5NS with 20K at 125cc/hr. +3 bilat feet and +2 to bilat legs. Pt bedbound and needs assist to turn.

## 2024-01-16 NOTE — PROGRESS NOTES
[facility]  Adult Nutrition  Progress Note    SUMMARY       Recommendations    Continue Regular diet with texture per SLP  Continue Boost Plus/Breeze TID  Re-consulted RD if alternate nutrition warranted  RD to monitor and follow    Goals: to meet % of EEN/EPn by next RD f/u  Nutrition Goal Status: goal not met  Communication of RD Recs:  (POC)    Assessment and Plan    Endocrine  Severe malnutrition  Malnutrition Type:  Context: chronic illness  Level: severe    Related to (etiology):   ESLD    Signs and Symptoms (as evidenced by):   Moderate to severe muscle/fat wasting, dull and thinning hair, spoon nails, reduced hand      Malnutrition Characteristic Summary:  Subcutaneous Fat (Malnutrition):  (moderate to severe)  Muscle Mass (Malnutrition):  (moderate to severe)  Hand  Strength, Left (Malnutrition): reduced  Hand  Strength, Right (Malnutrition): reduced      Interventions/Recommendations (treatment strategy):  Collaboration with other providers     Nutrition Diagnosis Status:   Continue    Malnutrition Assessment  Malnutrition Context: chronic illness  Malnutrition Level: severe  Skin (Micronutrient): yellow  Nails (Micronutrient): nail ends curved, spoon-shaped  Hair/Scalp (Micronutrient): dull, fine, sparse, scaly/flaky   Micronutrient Evaluation Summary: suspected deficiency  Micronutrient Evaluation Comments: B- vitamin deficiency   Subcutaneous Fat (Malnutrition):  (moderate to severe)  Muscle Mass (Malnutrition):  (moderate to severe)  Hand  Strength, Left (Malnutrition): reduced  Hand  Strength, Right (Malnutrition): reduced   Orbital Region (Subcutaneous Fat Loss): severe depletion  Upper Arm Region (Subcutaneous Fat Loss): severe depletion  Thoracic and Lumbar Region: moderate depletion   Caodaism Region (Muscle Loss): severe depletion  Clavicle Bone Region (Muscle Loss): moderate depletion  Clavicle and Acromion Bone Region (Muscle Loss): moderate  "depletion  Scapular Bone Region (Muscle Loss): moderate depletion  Dorsal Hand (Muscle Loss): severe depletion     Reason for Assessment    Reason For Assessment: RD follow-up  Diagnosis: liver disease (Acute liver failure without hepatic coma)  Relevant Medical History: alcoholic cirrhosis, bipolar disorder, chronic anemia, thrombocytopenia, cholelithiasis, DVT, h/o GI bleeds  Interdisciplinary Rounds: did not attend    General Information Comments:   RD f/u. Pt continue to have poor appetite, tolerating 25% of meals per chart. Drinking some Boost. Pending hospice care. Per previous assessment: Pt states that #, #. Mild edema present. Wt has appeared stable x 12 mo. NFPE performed on 1/6: RD feels pt does meet the criteria of severe malnutrition in the context of chronic illness. Please see PES for details.     Nutrition Discharge Planning: adequate PO intake and ONS of choice    Nutrition Risk Screen    Nutrition Risk Screen: difficulty chewing/swallowing    Nutrition/Diet History    Patient Reported Diet/Restrictions/Preferences: general  Typical Food/Fluid Intake: 3 meals per day  Spiritual, Cultural Beliefs, Scientology Practices, Values that Affect Care: no  Supplemental Drinks or Food Habits: Boost Original (x1/day)    Anthropometrics    Temp: 98.6 °F (37 °C)  Height: 5' 3" (160 cm)  Height (inches): 63 in  Weight Method: Bed Scale  Weight: 58.1 kg (128 lb 1.4 oz)  Weight (lb): 128.09 lb  Ideal Body Weight (IBW), Female: 115 lb  % Ideal Body Weight, Female (lb): 111.38 %  BMI (Calculated): 22.7  BMI Grade: 18.5-24.9 - normal       Lab/Procedures/Meds    Pertinent Labs Reviewed: reviewed  Pertinent Labs Comments: H/H: 9.4/29.4, phos 1.0, glucose 211, BUN 3, Cr 0.4, albumin 1.6, Tbili 23.6, , AST 42, Mg 1.4  Pertinent Medications Reviewed: reviewed  Pertinent Medications Comments: folic acid, pantoprazole, thiamine, lactulose, D5    Physical Findings/Assessment         Estimated/Assessed " Needs    Weight Used For Calorie Calculations: 58.1 kg (128 lb 1.4 oz)  Energy Calorie Requirements (kcal): 1452- 1743 kcal  Energy Need Method: Kcal/kg (25-30 kcal/kg)  Protein Requirements: 70g (1.2g/kg)  Weight Used For Protein Calculations: 58.1 kg (128 lb 1.4 oz)  Fluid Requirements (mL): 1ml/1kcal or per MD  Estimated Fluid Requirement Method: RDA Method  RDA Method (mL): 1452     Nutrition Prescription Ordered    Current Diet Order: level 5  Oral Nutrition Supplement: Boost Breeze    Evaluation of Received Nutrient/Fluid Intake    I/O: + 3.9 L since admit  Energy Calories Required: not meeting needs  Protein Required: not meeting needs  Fluid Required:  (as per MD)  Comments: LBM 1/16  Tolerance: tolerating  % Intake of Estimated Energy Needs: 0 - 25 %  % Meal Intake: 0 - 25 %    Nutrition Risk    Level of Risk/Frequency of Follow-up:  (1x/week)     Monitor and Evaluation    Food and Nutrient Intake: energy intake, food and beverage intake  Food and Nutrient Adminstration: diet order  Knowledge/Beliefs/Attitudes: beliefs and attitudes  Physical Activity and Function: nutrition-related ADLs and IADLs  Anthropometric Measurements: weight, weight change, body mass index  Biochemical Data, Medical Tests and Procedures: electrolyte and renal panel, glucose/endocrine profile, inflammatory profile, lipid profile  Nutrition-Focused Physical Findings: overall appearance, skin     Nutrition Follow-Up    RD Follow-up?: Yes

## 2024-01-16 NOTE — H&P
Please see IR consult note dated 1/16/2024    Hilary Michaud PA-C  Interventional Radiology  Spectra [phone]  1/16/2024

## 2024-01-16 NOTE — CARE UPDATE
"RAPID RESPONSE NURSE CHART REVIEW        Chart Reviewed: 01/16/2024, 11:34 AM    MRN: [id number]  Bed: 56618/53025 A    Dx: Decompensated hepatic cirrhosis    Marely Hamilton has a past medical history of Addiction to drug, Alcohol abuse, Alcohol abuse, in remission, Anemia, Anxiety, Behavioral problem, Bipolar disorder, Bipolar disorder in remission, Cirrhosis, Laennec's, Depression, Encounter for blood transfusion, Epistaxis, Fatigue, Glaucoma, Hematuria, Hepatic encephalopathy, Hepatic enlargement, History of psychiatric care, History of psychiatric hospitalization, History of seizure, hx of intentional Tylenol overdose, psychiatric care, Macrocytic anemia, Jeana, Osteoarthritis, Other ascites, Psychiatric exam requested by authority, Psychiatric problem, Psychosis, Renal disorder, Seizures, Self-harming behavior, Suicide attempt, Therapy, and Thrombocytopenia.    Last VS: BP (!) 154/67 (BP Location: Right arm, Patient Position: Lying)   Pulse 84   Temp 98.6 °F (37 °C)   Resp 20   Ht 5' 3" (1.6 m)   Wt 58.1 kg (128 lb 1.4 oz)   SpO2 (!) 94%   BMI 22.69 kg/m²     24H Vital Sign Range:  Temp:  [97.6 °F (36.4 °C)-98.6 °F (37 °C)]   Pulse:  [77-86]   Resp:  [16-20]   BP: (111-154)/(57-67)   SpO2:  [93 %-95 %]     Level of Consciousness (AVPU): alert    Recent Labs     01/14/24  0719 01/15/24  0611 01/16/24  0615   WBC 4.04 3.87* 5.17   HGB 9.2* 9.4* 9.4*   HCT 29.2* 29.3* 29.4*   PLT 54* 45* 49*       Recent Labs     01/14/24  0719 01/15/24  0611 01/16/24  0615    145 144   K 2.8* 3.3* 3.9   * 124* 124*   CO2 19* 18* 16*   BUN 5* 4* 3*   CREATININE 0.4* 0.4* 0.4*   * 196* 211*   PHOS 1.4* 1.1* <1.0*   MG 1.6 1.5* 1.4*        No results for input(s): "PH", "PCO2", "PO2", "HCO3", "POCSATURATED", "BE" in the last 72 hours.     OXYGEN:  Flow (L/min): 1  Oxygen Concentration (%): 32       MEWS score: 1    bedside RNdaly  contacted for Tachycardia. Reports patient plans to leave with hospice. No " additional concerns verbalized at this time. Instructed to call [phone] for further concerns or assistance.    Eriberto Baird RN

## 2024-01-16 NOTE — PROGRESS NOTES
[facility] Medicine  Progress Note    Patient Name: Marely Hamilton  MRN: [id number]  Patient Class: IP- Inpatient   Admission Date: 1/4/2024  Length of Stay: 11 days  Attending Physician: Lenka Ortiz MD  Primary Care Provider: Viktor Ross MD        Subjective:     Principal Problem:Decompensated hepatic cirrhosis        HPI:  Patient is a 58-year-old woman with past medical history significant for alcoholic cirrhosis, bipolar disorder, chronic anemia, thrombocytopenia, cholelithiasis, DVT with IVC filter in place, prior GI bleeds, ESBL UTI history, seizure disorder presenting from [facility] for evaluation of abdominal distension, peripheral edema, and jaundice.     Patient denies fever, chills, or abdominal pain. Patient was recently admitted in early December for hepatic encephalopathy.   Patient denies any pain at this time. She reports she has been urinating frequently though difficult to obtain history.   She denies recent illness/fever/chills/nausea/vomiting/diarrhea/constipation.  Patient has not been taking lactulose. Reports compliance with psychiatric medications and A&Ox3 but reports she can't take the lactulose due to not tolerating the sun.    Hospital Course:   In the ED, vitals stable but hypoxic on room air to the 80s, which improved with supplemental oxygen.  Presentation consistent with decompensated liver failure, meld score 22.    She was sent in by her nursing home facility for mild abdominal distention as well as significant jaundice.    Chest x-ray revealed a moderate-to-large pleural effusion with compressive atelectasis. Likely from her ascites. Her abdomen is soft, nontender.      Intake labs remarkable for Na 142, BUN 6, Cr 0.4, Calcium 7.8, , Tbili 12.1, , ALT 41, Ammonia 114, lactic 1.0.     Will admit for decompensated cirrhosis. Continue on lactulose for treatment of HE, rocephin for SBP ppx. Hepatology and IR consulted for  thoracentesis and further recommendations.   Patient is oriented to person, place, time, and situation but with some delusions.     Overview/Hospital Course:  Palliative care has been helping coordinate with sister. She is now DNR/DNI.     Follow nutrition goals and recommendations however follow aspiration precautions.     Appreciate psychiatry recommendations. Continue lithium and and decreased zyprexa.     Will discuss with neurology for her Keppra.     Escalated antibiotic coverage from rocephin to zosyn due to aspiration overnight on 1/9/24. Diet changed to pureed.     Jose evaluating EEG to r/o seizures, asked if can wean keppra. Keppra level is pending.        Interval History:   1/16: Patient wants hospice    Review of Systems   Unable to perform ROS: Mental status change   Constitutional:  Positive for appetite change. Negative for activity change and chills.   HENT:          Desquamation of lips   Respiratory:          Nasal canula in place   Gastrointestinal:         Incontinence of stools   Genitourinary:         Urinary incontinence   Skin:  Positive for color change.   Neurological:  Positive for tremors. Negative for speech difficulty.   Psychiatric/Behavioral:  Positive for confusion.      Objective:     Vital Signs (Most Recent):  Temp: 99 °F (37.2 °C) (01/16/24 1512)  Pulse: 88 (01/16/24 1512)  Resp: 19 (01/16/24 1512)  BP: (!) 120/59 (01/16/24 1512)  SpO2: 95 % (01/16/24 1512) Vital Signs (24h Range):  Temp:  [97.7 °F (36.5 °C)-99 °F (37.2 °C)] 99 °F (37.2 °C)  Pulse:  [77-88] 88  Resp:  [16-20] 19  SpO2:  [93 %-95 %] 95 %  BP: (111-154)/(57-67) 120/59     Weight: 58.1 kg (128 lb 1.4 oz)  Body mass index is 22.69 kg/m².    Intake/Output Summary (Last 24 hours) at 1/16/2024 7794  Last data filed at 1/16/2024 1400  Gross per 24 hour   Intake 4623.05 ml   Output 100 ml   Net 4523.05 ml         Physical Exam  Vitals reviewed.   Constitutional:       General: She is not in acute distress.      Appearance: She is cachectic. She is ill-appearing.   HENT:      Head: Normocephalic and atraumatic.      Mouth/Throat:      Mouth: Mucous membranes are dry.      Pharynx: No oropharyngeal exudate.      Comments: Desquamation of lips  Eyes:      General: Scleral icterus present.      Extraocular Movements: Extraocular movements intact.   Cardiovascular:      Rate and Rhythm: Normal rate and regular rhythm.      Heart sounds: Normal heart sounds. No murmur heard.  Pulmonary:      Effort: Pulmonary effort is normal. No respiratory distress.      Breath sounds: No wheezing.   Abdominal:      General: Bowel sounds are normal. There is no distension.      Palpations: Abdomen is soft.      Tenderness: There is no abdominal tenderness. There is no guarding.   Musculoskeletal:         General: No tenderness. Normal range of motion.      Cervical back: Normal range of motion and neck supple.   Lymphadenopathy:      Cervical: No cervical adenopathy.   Skin:     General: Skin is warm and dry.      Capillary Refill: Capillary refill takes less than 2 seconds.      Coloration: Skin is jaundiced.      Findings: No rash.   Neurological:      Mental Status: She is alert. She is disoriented.      Motor: Weakness: bilateral lower extremities.      Comments: Poor participation   Psychiatric:         Attention and Perception: She perceives visual hallucinations.         Behavior: Behavior is cooperative.         Thought Content: Thought content is delusional.             Significant Labs: All pertinent labs within the past 24 hours have been reviewed.    Significant Imaging: I have reviewed all pertinent imaging results/findings within the past 24 hours.    Assessment/Plan:      * Decompensated hepatic cirrhosis  Patient with known Cirrhosis with Child's class C. Co-morbidities are present and inclusive of ascites, malnutrition, anemia/pancytopenia, and Pleural effusion .  MELD-Na score calculated; MELD 3.0: 32 at 1/10/2024  5:57  AM  MELD-Na: 27 at 1/10/2024  5:57 AM  Calculated from:  Serum Creatinine: 0.5 mg/dL (Using min of 1 mg/dL) at 1/10/2024  5:57 AM  Serum Sodium: 147 mmol/L (Using max of 137 mmol/L) at 1/10/2024  5:57 AM  Total Bilirubin: 18.1 mg/dL at 1/10/2024  5:57 AM  Serum Albumin: 1.8 g/dL at 1/10/2024  5:57 AM  INR(ratio): 2.4 at 1/10/2024  5:57 AM  Age at listing (hypothetical): 58 years  Sex: Female at 1/10/2024  5:57 AM      Continue chronic meds. Etiology likely ETOH. Will avoid any hepatotoxic meds, and monitor CBC/CMP/INR for synthetic function.     Presenting with decompensation (increased ascites, Bili 12, ammonia 114).  Status post thoracentesis of 650 mL.  Awaiting cultures.  Could not find a pocket for paracentesis    -restart lactulose as she has been noncompliant  Appreciate hepatology recommendations.      Recommendations to include physical therapy occupational therapy, palliative care discussions for goals of care.  She is not a candidate for transplantation given frailty, poor social support and noncompliance with medication    Aspiration of food  Event on 1/9.   SLP monitoring and diet changed from minced to pureed 1/10      Acute hypoxic respiratory failure  Patient with Hypoxic Respiratory failure which is Acute on chronic.  she is not on home oxygen. Supplemental oxygen was provided and noted improvement in her O2 sats.    -chest x-ray with right-sided pleural effusion.  Status post thoracentesis and 650 mL of fluid drained.  Awaiting cultures.  Oxygen requirements decreased to 2 L at this time.  Continue to monitor    Now with aspiration PNA. Conitnue zosyn   SLP eval and aspiration precautions.     Presence of IVC filter  Chronic IVC filter as she is at risk for bleeding and had history of DVTs      Goals of care, counseling/discussion  Appreciate palliative care facilitation with goals of care conversation.  Patient is now DNR and other family meeting scheduled for tomorrow.    I also called and spoke  to sister Zaria and she is on board and wants us to continue treating her for encephalopathy and infection and continue psychiatric medications.    Palliative care encounter  Appreciate palliative care recommendations and meeting with family that is her sister.  Family in agreement to make her DNR.  Documentation in place.  Code status updated    Another meeting with sister by palliative. More meetings needed, sister to come her pablo Saturday from [place]      Aspiration pneumonia of right lung  Aspiration even AM of 1/9  Evaluated by SLP, diet changed to minced and now pureed 1/10.   Started Zosyn 1/9        Acute metabolic encephalopathy  2/2 cirrhosis - non compliant with Lactulose, continue lactulose.   Polypharmacy - adjusted medications. Decreased Zyprexa.   SBP could not be ruled out as no pocket for ascites, now has aspiration and R sided on CXR 1/9, escalate Abx to zosyn     Poor nutrition. Added MVI. Ensure adequate nutrition   Severe debility     Seizure history, on keppra BID, asked neurology to weigh in and if EEG needed or if adjustment to keppra dose.     Palliative care facilitating family meetings. Sister to arrive from [place] on saturday          Hypernatremia  Patient has hypernatremia which is controlled. The hypernatremia is due to  Poor PO intake and Lasix . We will aim to correct the sodium by 8-10mEq in 24 hours. We will correct their hypernatremia with Select IV fluids: 5% albumin 12.5gms . The patient's sodium results have been reviewed and are listed below.  Recent Labs   Lab 01/10/24  0557   *     Will check Urine lytes    Bipolar I disorder, most recent episode depressed  Continue home regimen: lithium, olanzapine    Appreciate psychiatry recommendations we will decrease Zyprexa to 2.5mg.      Seizure  History of documented seizures since 2015.  Most recent encounter in June 2023 where she was on 1 g b.i.d. of Keppra.  Continued on Keppra 1 g b.i.d..  Can consider checking an EEG ensure no  seizures if she stays confused and verify with Neurology.  Pending Keppra level      Severe malnutrition  Nutrition consulted. Most recent weight and BMI monitored-     Measurements:  Wt Readings from Last 1 Encounters:   01/10/24 58.1 kg (128 lb 1.4 oz)   Body mass index is 22.69 kg/m².    Patient has been screened and assessed by RD.    Malnutrition Type:  Context: chronic illness  Level: severe    Malnutrition Characteristic Summary:  Subcutaneous Fat (Malnutrition):  (moderate to severe)  Muscle Mass (Malnutrition):  (moderate to severe)  Hand  Strength, Left (Malnutrition): reduced  Hand  Strength, Right (Malnutrition): reduced    Interventions/Recommendations (treatment strategy):  1.) Recommend continuing Low Na diet, fluid per MD. 2.) Recommend Boost Plus (or Boost equivalent) BID to help optimize PRO/Kcal intake (32% of the FR). 3.) Continue to provide folic acid and thiamine- consider adding MVI. RD to monitor PO intake, skin, labs, wt.        Macrocytic anemia  Patient's anemia is currently controlled. Has not received any PRBCs to date. Etiology likely d/t chronic disease due to Chronic liver disease  Current CBC reviewed-   Lab Results   Component Value Date    HGB 8.0 (L) 01/10/2024    HCT 25.9 (L) 01/10/2024     Monitor serial CBC and transfuse if patient becomes hemodynamically unstable, symptomatic or H/H drops below 7.    She is on chronic thiamine and folate.  We will add multivitamin    Ascites  Mild ascites seen on imaging however no pocket seen on attempt for paracentesis on 1/8/24        VTE Risk Mitigation (From admission, onward)           Ordered     Reason for No Pharmacological VTE Prophylaxis  Once        Question:  Reasons:  Answer:  Risk of Bleeding    01/05/24 0317     IP VTE HIGH RISK PATIENT  Once         01/05/24 0317     Place sequential compression device  Until discontinued         01/05/24 0317                    Discharge Planning   BRAYAN: 1/17/2024     Code Status: DNR    Is the patient medically ready for discharge?: No    Reason for patient still in hospital (select all that apply): Patient trending condition  Discharge Plan A: Return to nursing home, Hospice/home                  Lenka Ortiz MD  Department of Hospital Medicine   [facility] - Transplant Stepdown

## 2024-01-16 NOTE — TREATMENT PLAN
Hepatology Treatment Plan    Marely Hamilton is a 58 y.o. female admitted to hospital 1/4/2024 (Hospital Day: 13) due to Decompensated hepatic cirrhosis.     Interval History  Patient now wants to go hospice after discussions with palliative care and primary team. IR consulted for PleurX placement.     T bili continues to climb      Objective  Temp:  [97.6 °F (36.4 °C)-98.6 °F (37 °C)] 98.6 °F (37 °C) (01/16 1124)  Pulse:  [77-86] 84 (01/16 1124)  BP: (111-154)/(57-67) 154/67 (01/16 1124)  Resp:  [16-20] 20 (01/16 1124)  SpO2:  [93 %-95 %] 94 % (01/16 1124)      Laboratory    Lab Results   Component Value Date    WBC 5.17 01/16/2024    HGB 9.4 (L) 01/16/2024    HCT 29.4 (L) 01/16/2024     (H) 01/16/2024    PLT 49 (L) 01/16/2024       Lab Results   Component Value Date     01/16/2024    K 3.9 01/16/2024     (H) 01/16/2024    CO2 16 (L) 01/16/2024    BUN 3 (L) 01/16/2024    CREATININE 0.4 (L) 01/16/2024    CALCIUM 7.4 (L) 01/16/2024       Lab Results   Component Value Date    ALBUMIN 1.6 (L) 01/16/2024    ALT 24 01/16/2024    AST 42 (H) 01/16/2024    GGT 33 06/17/2023    ALKPHOS 175 (H) 01/16/2024    BILITOT 23.6 (H) 01/16/2024       Lab Results   Component Value Date    INR 2.2 (H) 01/16/2024    INR 2.1 (H) 01/15/2024    INR 1.9 (H) 01/14/2024       MELD 3.0: 32 at 1/16/2024  6:15 AM  MELD-Na: 27 at 1/16/2024  6:15 AM  Calculated from:  Serum Creatinine: 0.4 mg/dL (Using min of 1 mg/dL) at 1/16/2024  6:15 AM  Serum Sodium: 144 mmol/L (Using max of 137 mmol/L) at 1/16/2024  6:15 AM  Total Bilirubin: 23.6 mg/dL at 1/16/2024  6:15 AM  Serum Albumin: 1.6 g/dL at 1/16/2024  6:15 AM  INR(ratio): 2.2 at 1/16/2024  6:15 AM  Age at listing (hypothetical): 58 years  Sex: Female at 1/16/2024  6:15 AM      Assessment  Marely SAHIL Joy is a 58 y.o. female with history of EtOH cirrhosis, bipolar disorder, DVT with IVC filter in place, prior GI bleeds, ESBL UTI history, and seizure disorder. She presented to the  ER from her nursing home for abdominal distension, peripheral edema, and jaundice. Found to have pleural effusion; thora performed. Has had multiple admissions in the past year for HE, UTI, falls, and GI bleeding (EGD on 7/6/23 showed esophageal ulcer, no varices, non-bleeding gastric ulcer and duodenal ulcer with oozing). Extremely frail and ill appearing on exam. Has been declined for a liver transplant twice - for frailty, non-compliance and comorbidities. S/P thoracentesis on 1/5/23 - 650cc fluid removed. Transudative. No plans for inpatient liver tx eval given frailty, social situation, and medical/psychiatric comorbidities.  Patient opting to go hospice which is appropriate. Prognosis is extremely poor.        Problem List:  Decompensated cirrhosis 2/2 EtOH use (c/b HE and ascites)  Hx of bleeding gastric and duodenal ulcers  EtOH use disorder  Bipolar disorder  Medication non-compliance    Plan  - Comfort care orders.   - Agree with PleurX catheter placement. Hospice is appropriate.  - Plan of care was discussed with primary team    Thank you for involving us in the care of Marely Hamilton. Please call with any additional concerns or questions.    Steve Lloyd MD  Gastroenterology and Hepatology Fellow, PGY-V

## 2024-01-16 NOTE — PLAN OF CARE
Pt aao x3 throughout most of shift. Call bell within reach. Turning her q2 hours and prn. Plan for abd drain placement tomorrow for hospice. Pt's sister was updated on plan per Dr. Ortiz and palliative care nurse. Pt verbalized understanding of plan of care.

## 2024-01-16 NOTE — PROGRESS NOTES
Attempted to get consent from sister Zaria for paracentesis. Sister very upset. Reports that she has been trying to get updates on her sister. Message sent to Dr. Ortiz. Paracentesis on hold.     Italia Caban PA-C  Interventional Radiology  [phone]

## 2024-01-16 NOTE — SUBJECTIVE & OBJECTIVE
Interval History:   1/15: More awake today. Went to bedside but family had already left.       Review of Systems   Unable to perform ROS: Mental status change   Constitutional:  Positive for appetite change. Negative for activity change and chills.   HENT:          Desquamation of lips   Respiratory:          Nasal canula in place   Gastrointestinal:         Incontinence of stools   Genitourinary:         Urinary incontinence   Skin:  Positive for color change.   Neurological:  Positive for tremors. Negative for speech difficulty.   Psychiatric/Behavioral:  Positive for confusion.      Objective:     Vital Signs (Most Recent):  Temp: 97.6 °F (36.4 °C) (01/15/24 1554)  Pulse: 86 (01/15/24 1554)  Resp: 18 (01/15/24 1554)  BP: 129/62 (01/15/24 1554)  SpO2: (!) 94 % (01/15/24 1554) Vital Signs (24h Range):  Temp:  [97.6 °F (36.4 °C)-98.5 °F (36.9 °C)] 97.6 °F (36.4 °C)  Pulse:  [68-86] 86  Resp:  [16-20] 18  SpO2:  [93 %-97 %] 94 %  BP: (106-137)/(52-76) 129/62     Weight: 58.1 kg (128 lb 1.4 oz)  Body mass index is 22.69 kg/m².    Intake/Output Summary (Last 24 hours) at 1/15/2024 1856  Last data filed at 1/15/2024 1800  Gross per 24 hour   Intake 2478.27 ml   Output 450 ml   Net 2028.27 ml         Physical Exam  Vitals reviewed.   Constitutional:       General: She is not in acute distress.     Appearance: She is cachectic. She is ill-appearing.   HENT:      Head: Normocephalic and atraumatic.      Mouth/Throat:      Mouth: Mucous membranes are dry.      Pharynx: No oropharyngeal exudate.      Comments: Desquamation of lips  Eyes:      General: Scleral icterus present.      Extraocular Movements: Extraocular movements intact.   Cardiovascular:      Rate and Rhythm: Normal rate and regular rhythm.      Heart sounds: Normal heart sounds. No murmur heard.  Pulmonary:      Effort: Pulmonary effort is normal. No respiratory distress.      Breath sounds: No wheezing.   Abdominal:      General: Bowel sounds are normal. There  is no distension.      Palpations: Abdomen is soft.      Tenderness: There is no abdominal tenderness. There is no guarding.   Musculoskeletal:         General: No tenderness. Normal range of motion.      Cervical back: Normal range of motion and neck supple.   Lymphadenopathy:      Cervical: No cervical adenopathy.   Skin:     General: Skin is warm and dry.      Capillary Refill: Capillary refill takes less than 2 seconds.      Coloration: Skin is jaundiced.      Findings: No rash.   Neurological:      Mental Status: She is alert. She is disoriented.      Motor: Weakness: bilateral lower extremities.      Comments: Poor participation   Psychiatric:         Attention and Perception: She perceives visual hallucinations.         Behavior: Behavior is cooperative.         Thought Content: Thought content is delusional.             Significant Labs: All pertinent labs within the past 24 hours have been reviewed.    Significant Imaging: I have reviewed all pertinent imaging results/findings within the past 24 hours.

## 2024-01-16 NOTE — SUBJECTIVE & OBJECTIVE
Medications:  Continuous Infusions:   dextrose 5 % and 0.9 % NaCl with KCl 20 mEq 125 mL/hr at 01/15/24 1334     Scheduled Meds:   amoxicillin-clavulanate  875.2 mg Oral Q12H    folic acid (FOLVITE) IVPB  1 mg Intravenous Daily    lactulose  20 g Oral TID    levETIRAcetam (Keppra) IV (PEDS and ADULTS)  1,000 mg Intravenous Q12H    lithium  300 mg Oral QHS    OLANZapine zydis  5 mg Oral QHS    pantoprazole  40 mg Intravenous Daily    rifAXIMin  550 mg Oral BID    thiamine (B-1) 100 mg in dextrose 5 % (D5W) 100 mL IVPB  100 mg Intravenous Daily     PRN Meds:acetaminophen, ALPRAZolam, glucagon (human recombinant), glucose, glucose, insulin aspart U-100, ondansetron, prochlorperazine, sodium chloride 0.9%    Objective:     Vital Signs (Most Recent):  Temp: 98.6 °F (37 °C) (01/16/24 1124)  Pulse: 84 (01/16/24 1124)  Resp: 20 (01/16/24 1124)  BP: (!) 154/67 (01/16/24 1124)  SpO2: (!) 94 % (01/16/24 1124) Vital Signs (24h Range):  Temp:  [97.6 °F (36.4 °C)-98.6 °F (37 °C)] 98.6 °F (37 °C)  Pulse:  [77-86] 84  Resp:  [16-20] 20  SpO2:  [93 %-95 %] 94 %  BP: (111-154)/(57-67) 154/67     Weight: 58.1 kg (128 lb 1.4 oz)  Body mass index is 22.69 kg/m².       Physical Exam  Constitutional:       Appearance: She is ill-appearing.   Abdominal:      General: There is distension.   Skin:     Coloration: Skin is jaundiced.   Neurological:      Mental Status: She is alert.      Comments: Awake, oriented to person only.             Review of Symptoms      Symptom Assessment (ESAS 0-10 Scale)  Pain:  0  Dyspnea:  0  Anxiety:  0  Nausea:  0  Depression:  0  Anorexia:  0  Fatigue:  0  Insomnia:  0  Restlessness:  0  Agitation:  0 due to Acuity of condition         Pain Assessment in Advanced Demential Scale (PAINAD)   Breathing - Independent of vocalization:  0  Negative vocalization:  0  Facial expression:  0  Body language:  0  Consolability:  0  Total:  0    Performance Status:  50    Living Arrangements:  Lives in nursing  home    Psychosocial/Cultural:   See Palliative Psychosocial Note: Yes  Lives in [facility] after ICU stay in 2023. Sister is decision maker who lives in [facility].   **Primary  to Follow**  Palliative Care  Consult: Yes        Advance Care Planning   Advance Directives:   Living Will: No    LaPOST: No    Do Not Resuscitate Status: Yes    Medical Power of : Documentation in chart states that pt wishes her sister Zaria to make decisions..      Decision Making:  Family answered questions and Patient unable to communicate due to disease severity/cognitive impairment  Goals of Care: What is most important right now is to focus on symptom/pain control. Accordingly, we have decided that the best plan to meet the patient's goals includes unclear from the sister's perspective..         Significant Labs: CBC:   Recent Labs   Lab 01/15/24  0611 01/16/24 0615   WBC 3.87* 5.17   HGB 9.4* 9.4*   HCT 29.3* 29.4*   PLT 45* 49*       CMP:   Recent Labs   Lab 01/15/24  0611 01/16/24  0615    144   K 3.3* 3.9   * 124*   CO2 18* 16*   * 211*   BUN 4* 3*   CREATININE 0.4* 0.4*   CALCIUM 7.7* 7.4*   PROT 4.5* 4.4*   ALBUMIN 1.6* 1.6*   BILITOT 21.4* 23.6*   ALKPHOS 173* 175*   AST 48* 42*   ALT 26 24   ANIONGAP 3* 4*       CBC:   Recent Labs   Lab 01/16/24 0615   WBC 5.17   HGB 9.4*   HCT 29.4*   *   PLT 49*       BMP:  Recent Labs   Lab 01/16/24 0615   *      K 3.9   *   CO2 16*   BUN 3*   CREATININE 0.4*   CALCIUM 7.4*   MG 1.4*       LFT:  Lab Results   Component Value Date    AST 42 (H) 01/16/2024    GGT 33 06/17/2023    ALKPHOS 175 (H) 01/16/2024    BILITOT 23.6 (H) 01/16/2024     Albumin:   Albumin   Date Value Ref Range Status   01/16/2024 1.6 (L) 3.5 - 5.2 g/dL Final     Protein:   Total Protein   Date Value Ref Range Status   01/16/2024 4.4 (L) 6.0 - 8.4 g/dL Final     Lactic acid:   Lab Results   Component Value Date    LACTATE 1.0  01/05/2024    LACTATE 2.3 (H) 11/29/2023       Significant Imaging: I have reviewed all pertinent imaging results/findings within the past 24 hours.

## 2024-01-16 NOTE — PROGRESS NOTES
Consent obtained from sister for paracentesis.     Italia Caban PA-C  Interventional Radiology  [phone]

## 2024-01-17 VITALS
HEIGHT: 63 IN | WEIGHT: 128.06 LBS | BODY MASS INDEX: 22.69 KG/M2 | RESPIRATION RATE: 14 BRPM | TEMPERATURE: 98 F | HEART RATE: 74 BPM | DIASTOLIC BLOOD PRESSURE: 50 MMHG | OXYGEN SATURATION: 97 % | SYSTOLIC BLOOD PRESSURE: 101 MMHG

## 2024-01-17 LAB
ALBUMIN FLD-MCNC: <0.4 G/DL
ALBUMIN SERPL BCP-MCNC: 1.6 G/DL (ref 3.5–5.2)
ALP SERPL-CCNC: 197 U/L (ref 55–135)
ALT SERPL W/O P-5'-P-CCNC: 27 U/L (ref 10–44)
ANION GAP SERPL CALC-SCNC: 5 MMOL/L (ref 8–16)
APPEARANCE FLD: CLEAR
AST SERPL-CCNC: 41 U/L (ref 10–40)
BASOPHILS # BLD AUTO: 0.04 K/UL (ref 0–0.2)
BASOPHILS NFR BLD: 0.4 % (ref 0–1.9)
BILIRUB SERPL-MCNC: 29.1 MG/DL (ref 0.1–1)
BODY FLD TYPE: NORMAL
BODY FLUID SOURCE, LDH: NORMAL
BUN SERPL-MCNC: 3 MG/DL (ref 6–20)
CALCIUM SERPL-MCNC: 7.4 MG/DL (ref 8.7–10.5)
CHLORIDE SERPL-SCNC: 119 MMOL/L (ref 95–110)
CO2 SERPL-SCNC: 14 MMOL/L (ref 23–29)
COLOR FLD: YELLOW
CREAT SERPL-MCNC: 0.5 MG/DL (ref 0.5–1.4)
DIFFERENTIAL METHOD BLD: ABNORMAL
EOSINOPHIL # BLD AUTO: 0.2 K/UL (ref 0–0.5)
EOSINOPHIL NFR BLD: 2.2 % (ref 0–8)
EOSINOPHIL NFR FLD MANUAL: 1 %
ERYTHROCYTE [DISTWIDTH] IN BLOOD BY AUTOMATED COUNT: 16.5 % (ref 11.5–14.5)
EST. GFR  (NO RACE VARIABLE): >60 ML/MIN/1.73 M^2
GLUCOSE SERPL-MCNC: 180 MG/DL (ref 70–110)
GRAM STN SPEC: NORMAL
GRAM STN SPEC: NORMAL
HCT VFR BLD AUTO: 29.4 % (ref 37–48.5)
HGB BLD-MCNC: 9.4 G/DL (ref 12–16)
IMM GRANULOCYTES # BLD AUTO: 0.12 K/UL (ref 0–0.04)
IMM GRANULOCYTES NFR BLD AUTO: 1.1 % (ref 0–0.5)
INR PPP: 2.2 (ref 0.8–1.2)
LDH FLD L TO P-CCNC: 40 U/L
LYMPHOCYTES # BLD AUTO: 0.6 K/UL (ref 1–4.8)
LYMPHOCYTES NFR BLD: 5.5 % (ref 18–48)
LYMPHOCYTES NFR FLD MANUAL: 9 %
MAGNESIUM SERPL-MCNC: 1.3 MG/DL (ref 1.6–2.6)
MCH RBC QN AUTO: 37.3 PG (ref 27–31)
MCHC RBC AUTO-ENTMCNC: 32 G/DL (ref 32–36)
MCV RBC AUTO: 117 FL (ref 82–98)
MESOTHL CELL NFR FLD MANUAL: 2 %
MONOCYTES # BLD AUTO: 0.6 K/UL (ref 0.3–1)
MONOCYTES NFR BLD: 6 % (ref 4–15)
MONOS+MACROS NFR FLD MANUAL: 86 %
NEUTROPHILS # BLD AUTO: 9.1 K/UL (ref 1.8–7.7)
NEUTROPHILS NFR BLD: 84.8 % (ref 38–73)
NEUTROPHILS NFR FLD MANUAL: 2 %
NRBC BLD-RTO: 0 /100 WBC
PHOSPHATE SERPL-MCNC: 1.6 MG/DL (ref 2.7–4.5)
PLATELET # BLD AUTO: 69 K/UL (ref 150–450)
PMV BLD AUTO: 9.5 FL (ref 9.2–12.9)
POTASSIUM SERPL-SCNC: 4.2 MMOL/L (ref 3.5–5.1)
PROT FLD-MCNC: <1 G/DL
PROT SERPL-MCNC: 4.6 G/DL (ref 6–8.4)
PROTHROMBIN TIME: 22.5 SEC (ref 9–12.5)
RBC # BLD AUTO: 2.52 M/UL (ref 4–5.4)
SODIUM SERPL-SCNC: 138 MMOL/L (ref 136–145)
SPECIMEN SOURCE: NORMAL
SPECIMEN SOURCE: NORMAL
WBC # BLD AUTO: 10.67 K/UL (ref 3.9–12.7)
WBC # FLD: 10 /CU MM

## 2024-01-17 PROCEDURE — C9113 INJ PANTOPRAZOLE SODIUM, VIA: HCPCS | Performed by: HOSPITALIST

## 2024-01-17 PROCEDURE — 89051 BODY FLUID CELL COUNT: CPT | Performed by: HOSPITALIST

## 2024-01-17 PROCEDURE — 85025 COMPLETE CBC W/AUTO DIFF WBC: CPT | Performed by: STUDENT IN AN ORGANIZED HEALTH CARE EDUCATION/TRAINING PROGRAM

## 2024-01-17 PROCEDURE — 25000003 PHARM REV CODE 250: Performed by: STUDENT IN AN ORGANIZED HEALTH CARE EDUCATION/TRAINING PROGRAM

## 2024-01-17 PROCEDURE — 36415 COLL VENOUS BLD VENIPUNCTURE: CPT | Performed by: STUDENT IN AN ORGANIZED HEALTH CARE EDUCATION/TRAINING PROGRAM

## 2024-01-17 PROCEDURE — 82042 OTHER SOURCE ALBUMIN QUAN EA: CPT | Performed by: HOSPITALIST

## 2024-01-17 PROCEDURE — 85610 PROTHROMBIN TIME: CPT | Performed by: STUDENT IN AN ORGANIZED HEALTH CARE EDUCATION/TRAINING PROGRAM

## 2024-01-17 PROCEDURE — 83735 ASSAY OF MAGNESIUM: CPT | Performed by: STUDENT IN AN ORGANIZED HEALTH CARE EDUCATION/TRAINING PROGRAM

## 2024-01-17 PROCEDURE — 83615 LACTATE (LD) (LDH) ENZYME: CPT | Performed by: HOSPITALIST

## 2024-01-17 PROCEDURE — 80053 COMPREHEN METABOLIC PANEL: CPT | Performed by: STUDENT IN AN ORGANIZED HEALTH CARE EDUCATION/TRAINING PROGRAM

## 2024-01-17 PROCEDURE — 63600175 PHARM REV CODE 636 W HCPCS: Performed by: HOSPITALIST

## 2024-01-17 PROCEDURE — 63600175 PHARM REV CODE 636 W HCPCS: Performed by: INTERNAL MEDICINE

## 2024-01-17 PROCEDURE — 87205 SMEAR GRAM STAIN: CPT | Performed by: HOSPITALIST

## 2024-01-17 PROCEDURE — 25000003 PHARM REV CODE 250: Performed by: HOSPITALIST

## 2024-01-17 PROCEDURE — 87075 CULTR BACTERIA EXCEPT BLOOD: CPT | Performed by: HOSPITALIST

## 2024-01-17 PROCEDURE — 87070 CULTURE OTHR SPECIMN AEROBIC: CPT | Performed by: HOSPITALIST

## 2024-01-17 PROCEDURE — 84157 ASSAY OF PROTEIN OTHER: CPT | Performed by: HOSPITALIST

## 2024-01-17 PROCEDURE — 84100 ASSAY OF PHOSPHORUS: CPT | Performed by: STUDENT IN AN ORGANIZED HEALTH CARE EDUCATION/TRAINING PROGRAM

## 2024-01-17 PROCEDURE — 0W9G30Z DRAINAGE OF PERITONEAL CAVITY WITH DRAINAGE DEVICE, PERCUTANEOUS APPROACH: ICD-10-PCS | Performed by: STUDENT IN AN ORGANIZED HEALTH CARE EDUCATION/TRAINING PROGRAM

## 2024-01-17 RX ORDER — MIDAZOLAM HYDROCHLORIDE 1 MG/ML
INJECTION INTRAMUSCULAR; INTRAVENOUS
Status: COMPLETED | OUTPATIENT
Start: 2024-01-17 | End: 2024-01-17

## 2024-01-17 RX ORDER — LACTULOSE 10 G/15ML
30 SOLUTION ORAL; RECTAL 3 TIMES DAILY
Qty: 4050 ML | Refills: 0 | Status: SHIPPED | OUTPATIENT
Start: 2024-01-17 | End: 2024-02-16

## 2024-01-17 RX ORDER — FENTANYL CITRATE 50 UG/ML
INJECTION, SOLUTION INTRAMUSCULAR; INTRAVENOUS
Status: COMPLETED | OUTPATIENT
Start: 2024-01-17 | End: 2024-01-17

## 2024-01-17 RX ORDER — OLANZAPINE 5 MG/1
5 TABLET ORAL NIGHTLY
Qty: 30 TABLET | Refills: 11 | Status: SHIPPED | OUTPATIENT
Start: 2024-01-17 | End: 2025-01-16

## 2024-01-17 RX ORDER — THIAMINE HCL 100 MG
100 TABLET ORAL DAILY
Status: DISCONTINUED | OUTPATIENT
Start: 2024-01-17 | End: 2024-01-18 | Stop reason: HOSPADM

## 2024-01-17 RX ORDER — ALPRAZOLAM 0.25 MG/1
0.25 TABLET ORAL 2 TIMES DAILY PRN
Qty: 60 TABLET | Refills: 0 | Status: SHIPPED | OUTPATIENT
Start: 2024-01-17 | End: 2024-02-16

## 2024-01-17 RX ORDER — LITHIUM CARBONATE 300 MG/1
300 TABLET, FILM COATED, EXTENDED RELEASE ORAL NIGHTLY
Qty: 30 TABLET | Refills: 6 | Status: SHIPPED | OUTPATIENT
Start: 2024-01-17 | End: 2024-01-17

## 2024-01-17 RX ORDER — LACTULOSE 10 G/15ML
30 SOLUTION ORAL; RECTAL 3 TIMES DAILY
Qty: 4050 ML | Refills: 0 | Status: SHIPPED | OUTPATIENT
Start: 2024-01-17 | End: 2024-01-17

## 2024-01-17 RX ORDER — LEVETIRACETAM 500 MG/1
1000 TABLET ORAL 2 TIMES DAILY
Status: DISCONTINUED | OUTPATIENT
Start: 2024-01-17 | End: 2024-01-18 | Stop reason: HOSPADM

## 2024-01-17 RX ORDER — LANOLIN ALCOHOL/MO/W.PET/CERES
100 CREAM (GRAM) TOPICAL DAILY
Qty: 30 TABLET | Refills: 0 | Status: SHIPPED | OUTPATIENT
Start: 2024-01-17 | End: 2024-02-16

## 2024-01-17 RX ORDER — LITHIUM CARBONATE 300 MG/1
300 TABLET, FILM COATED, EXTENDED RELEASE ORAL NIGHTLY
Qty: 30 TABLET | Refills: 6 | Status: SHIPPED | OUTPATIENT
Start: 2024-01-17 | End: 2024-08-14

## 2024-01-17 RX ADMIN — PANTOPRAZOLE SODIUM 40 MG: 40 INJECTION, POWDER, FOR SOLUTION INTRAVENOUS at 09:01

## 2024-01-17 RX ADMIN — LACTULOSE 20 G: 20 SOLUTION ORAL at 02:01

## 2024-01-17 RX ADMIN — FENTANYL CITRATE 25 MCG: 50 INJECTION, SOLUTION INTRAMUSCULAR; INTRAVENOUS at 11:01

## 2024-01-17 RX ADMIN — MIDAZOLAM HYDROCHLORIDE 0.5 MG: 2 INJECTION, SOLUTION INTRAMUSCULAR; INTRAVENOUS at 11:01

## 2024-01-17 RX ADMIN — Medication 100 MG: at 10:01

## 2024-01-17 RX ADMIN — LEVETIRACETAM 1000 MG: 500 TABLET, FILM COATED ORAL at 12:01

## 2024-01-17 RX ADMIN — ACETAMINOPHEN 650 MG: 650 SOLUTION ORAL at 02:01

## 2024-01-17 RX ADMIN — ONDANSETRON 4 MG: 2 INJECTION INTRAMUSCULAR; INTRAVENOUS at 03:01

## 2024-01-17 RX ADMIN — RIFAXIMIN 550 MG: 550 TABLET ORAL at 12:01

## 2024-01-17 NOTE — SUBJECTIVE & OBJECTIVE
Interval History:   1/17: Pleurex today    Review of Systems   Unable to perform ROS: Mental status change   Constitutional:  Positive for appetite change. Negative for activity change and chills.   HENT:          Desquamation of lips   Respiratory:          Nasal canula in place   Gastrointestinal:         Incontinence of stools   Genitourinary:         Urinary incontinence   Skin:  Positive for color change.   Neurological:  Positive for tremors. Negative for speech difficulty.   Psychiatric/Behavioral:  Positive for confusion.      Objective:     Vital Signs (Most Recent):  Temp: 98 °F (36.7 °C) (01/17/24 1200)  Pulse: 80 (01/17/24 1215)  Resp: 16 (01/17/24 1215)  BP: (!) 118/54 (01/17/24 1215)  SpO2: 98 % (01/17/24 1215) Vital Signs (24h Range):  Temp:  [97.5 °F (36.4 °C)-99 °F (37.2 °C)] 98 °F (36.7 °C)  Pulse:  [73-88] 80  Resp:  [9-75] 16  SpO2:  [95 %-100 %] 98 %  BP: (109-129)/(52-68) 118/54     Weight: 58.1 kg (128 lb 1.4 oz)  Body mass index is 22.69 kg/m².    Intake/Output Summary (Last 24 hours) at 1/17/2024 1239  Last data filed at 1/17/2024 1145  Gross per 24 hour   Intake 2172.39 ml   Output 4600 ml   Net -2427.61 ml         Physical Exam  Vitals reviewed.   Constitutional:       General: She is not in acute distress.     Appearance: She is cachectic. She is ill-appearing.   HENT:      Head: Normocephalic and atraumatic.      Mouth/Throat:      Mouth: Mucous membranes are dry.      Pharynx: No oropharyngeal exudate.      Comments: Desquamation of lips  Eyes:      General: Scleral icterus present.      Extraocular Movements: Extraocular movements intact.   Cardiovascular:      Rate and Rhythm: Normal rate and regular rhythm.      Heart sounds: Normal heart sounds. No murmur heard.  Pulmonary:      Effort: Pulmonary effort is normal. No respiratory distress.      Breath sounds: No wheezing.   Abdominal:      General: Bowel sounds are normal. There is no distension.      Palpations: Abdomen is soft.       Tenderness: There is no abdominal tenderness. There is no guarding.   Musculoskeletal:         General: No tenderness. Normal range of motion.      Cervical back: Normal range of motion and neck supple.   Lymphadenopathy:      Cervical: No cervical adenopathy.   Skin:     General: Skin is warm and dry.      Capillary Refill: Capillary refill takes less than 2 seconds.      Coloration: Skin is jaundiced.      Findings: No rash.   Neurological:      Mental Status: She is alert. She is disoriented.      Motor: Weakness: bilateral lower extremities.      Comments: Poor participation   Psychiatric:         Attention and Perception: She perceives visual hallucinations.         Behavior: Behavior is cooperative.         Thought Content: Thought content is delusional.             Significant Labs: All pertinent labs within the past 24 hours have been reviewed.    Significant Imaging: I have reviewed all pertinent imaging results/findings within the past 24 hours.

## 2024-01-17 NOTE — PLAN OF CARE
Patient will return to [facility] under the care of [facility] once hospice consents completed by patient's sister     [facility] - Transplant Stepdown  Discharge Final Note    Primary Care Provider: Viktor Ross MD    Expected Discharge Date: 1/17/2024    Final Discharge Note (most recent)       Final Note - 01/17/24 1425          Final Note    Assessment Type Final Discharge Note     Anticipated Discharge Disposition Hospice/Medical Facility     What phone number can be called within the next 1-3 days to see how you are doing after discharge? [phone]     Hospital Resources/Appts/Education Provided Provided patient/caregiver with written discharge plan information        Post-Acute Status    Post-Acute Authorization Hospice;Placement     Post-Acute Placement Status Set-up Complete/Auth obtained     Hospice Status Pending post acute provider review/more information requested     Discharge Delays None known at this time                     Important Message from Medicare  Important Message from Medicare regarding Discharge Appeal Rights: Given to patient/caregiver, Signed/date by patient/caregiver     Date IMM was signed: 01/17/24  Time IMM was signed: 1007

## 2024-01-17 NOTE — PLAN OF CARE
01/17/24 1005   Post-Acute Status   Post-Acute Authorization Placement;Hospice   Post-Acute Placement Status Referrals Sent   Hospice Status Referrals Sent     The SW faxed the patient's nursing home orders with hospice to [facility] and [facility] via [facility] for review.    The SW placed a secure chat to the patient's care team.     12:28 PM  The SW contacted the patient's sister and provided her with a update regarding the patient's referral being sent to [facility] and [facility].     The SW contacted [facility] to follow-up regarding the patient's pending referral for Nursing Home with Hospice. The representative stated that [facility] was planning on accepting the patient and then setting up hospice with [facility].  The representative stated that [facility] is slow in admitting their residents. The representative informed the SW that [facility] also works with [facility].  The SW contacted the patient's sister to provide a update. The patient's sister was in agreement with sending out a referral to [facility] and interviewing both in order to obtain an accepting hospice provider.     1:38 PM  The SW contacted [facility] to follow-up regarding this patient's referral. The representative confirmed receipt of the patient's referral. The representative reported that one of their associates has reached out to the patient's family.     The SW tried contacting the patient's sister by phone to follow-up but the patient's sister did not answer her phone. The SW left a message awaiting a call back.     2:16 PM    The SW received a phone call from the patient's sister reported that their preference is  [facility]. The patient's sister reported that she was awaiting documentation from from [facility].     The SW received a phone call from the representative from [facility] stating that  she was emailing the patient's sister the consents for treatment.   The SW provided the care team a update via secure chat.    4:17 PM  The SW contacted [facility] at [phone] to follow-up regarding the patient's pending d/c.  The SW spoke with Cordell with [facility]. Per Cordell, the patient's consent has not consented for hospice treatment. The SW contacted the patient's sister to follow-up regarding the patient's pending d/c. The patient's sister reported that she was reviewing the patient's consents from [facility]. The SW received phone call from Cecilia  with [facility]. Per Cecilia , [facility] is unable to accept this patient until they receives the consents. The SW provided the on call CM team contact information to Cecilia. The SW sent the ON CALL CASE Department and secure chat and sent the patient's case team.          The SW will continue to follow.     Logan Modi LMSW  Case Management [facility]

## 2024-01-17 NOTE — PLAN OF CARE
Pt arrived to 190 for abdominal pluerex. Pt oriented to unit and staff, Pt safely transferred from stretcher to procedural table. Fall risk reviewed and comfort measures utilized with interventions. Safety strap applied, position pillows to minimize pressure points. Blankets applied. Pt prepped and draped utilizing standard sterile technique. Patient placed on continuous monitoring, as required by sedation policy. Timeouts implemented utilizing standard universal time-out per department and facility policy. RN to remain at bedside with continuous monitoring. Pt resting comfortably. Denies pain/discomfort. Will continue to monitor. See flow sheets for monitoring, medication administration, and updates. patient verbalizes understanding.

## 2024-01-17 NOTE — PLAN OF CARE
Sw informed by [facility] rep that Pt's consents are completed for hospice care and Pt can transport to [facility]. Sw to contact nurse and setup transport asap, nurse to call report to [facility] at .

## 2024-01-17 NOTE — PROGRESS NOTES
[facility] - Transplant [facility] Medicine  Progress Note    Patient Name: Marely Hamilton  MRN: [id number]  Patient Class: IP- Inpatient   Admission Date: 1/4/2024  Length of Stay: 12 days  Attending Physician: Lenka Ortiz MD  Primary Care Provider: Viktor Ross MD        Subjective:     Principal Problem:Decompensated hepatic cirrhosis        HPI:  Patient is a 58-year-old woman with past medical history significant for alcoholic cirrhosis, bipolar disorder, chronic anemia, thrombocytopenia, cholelithiasis, DVT with IVC filter in place, prior GI bleeds, ESBL UTI history, seizure disorder presenting from [facility] for evaluation of abdominal distension, peripheral edema, and jaundice.     Patient denies fever, chills, or abdominal pain. Patient was recently admitted in early December for hepatic encephalopathy.   Patient denies any pain at this time. She reports she has been urinating frequently though difficult to obtain history.   She denies recent illness/fever/chills/nausea/vomiting/diarrhea/constipation.  Patient has not been taking lactulose. Reports compliance with psychiatric medications and A&Ox3 but reports she can't take the lactulose due to not tolerating the sun.    Hospital Course:   In the ED, vitals stable but hypoxic on room air to the 80s, which improved with supplemental oxygen.  Presentation consistent with decompensated liver failure, meld score 22.    She was sent in by her nursing home facility for mild abdominal distention as well as significant jaundice.    Chest x-ray revealed a moderate-to-large pleural effusion with compressive atelectasis. Likely from her ascites. Her abdomen is soft, nontender.      Intake labs remarkable for Na 142, BUN 6, Cr 0.4, Calcium 7.8, , Tbili 12.1, , ALT 41, Ammonia 114, lactic 1.0.     Will admit for decompensated cirrhosis. Continue on lactulose for treatment of HE, rocephin for SBP ppx. Hepatology and IR consulted for  thoracentesis and further recommendations.   Patient is oriented to person, place, time, and situation but with some delusions.     Overview/Hospital Course:  Palliative care has been helping coordinate with sister. She is now DNR/DNI.     Follow nutrition goals and recommendations however follow aspiration precautions.     Appreciate psychiatry recommendations. Continue lithium and and decreased zyprexa.     Will discuss with neurology for her Keppra.     Escalated antibiotic coverage from rocephin to zosyn due to aspiration overnight on 1/9/24. Diet changed to pureed.     Jose evaluating EEG to r/o seizures, asked if can wean keppra. Keppra level is pending.        Interval History:   1/17: Pleurex today    Review of Systems   Unable to perform ROS: Mental status change   Constitutional:  Positive for appetite change. Negative for activity change and chills.   HENT:          Desquamation of lips   Respiratory:          Nasal canula in place   Gastrointestinal:         Incontinence of stools   Genitourinary:         Urinary incontinence   Skin:  Positive for color change.   Neurological:  Positive for tremors. Negative for speech difficulty.   Psychiatric/Behavioral:  Positive for confusion.      Objective:     Vital Signs (Most Recent):  Temp: 98 °F (36.7 °C) (01/17/24 1200)  Pulse: 80 (01/17/24 1215)  Resp: 16 (01/17/24 1215)  BP: (!) 118/54 (01/17/24 1215)  SpO2: 98 % (01/17/24 1215) Vital Signs (24h Range):  Temp:  [97.5 °F (36.4 °C)-99 °F (37.2 °C)] 98 °F (36.7 °C)  Pulse:  [73-88] 80  Resp:  [9-75] 16  SpO2:  [95 %-100 %] 98 %  BP: (109-129)/(52-68) 118/54     Weight: 58.1 kg (128 lb 1.4 oz)  Body mass index is 22.69 kg/m².    Intake/Output Summary (Last 24 hours) at 1/17/2024 1239  Last data filed at 1/17/2024 1145  Gross per 24 hour   Intake 2172.39 ml   Output 4600 ml   Net -2427.61 ml         Physical Exam  Vitals reviewed.   Constitutional:       General: She is not in acute distress.     Appearance:  She is cachectic. She is ill-appearing.   HENT:      Head: Normocephalic and atraumatic.      Mouth/Throat:      Mouth: Mucous membranes are dry.      Pharynx: No oropharyngeal exudate.      Comments: Desquamation of lips  Eyes:      General: Scleral icterus present.      Extraocular Movements: Extraocular movements intact.   Cardiovascular:      Rate and Rhythm: Normal rate and regular rhythm.      Heart sounds: Normal heart sounds. No murmur heard.  Pulmonary:      Effort: Pulmonary effort is normal. No respiratory distress.      Breath sounds: No wheezing.   Abdominal:      General: Bowel sounds are normal. There is no distension.      Palpations: Abdomen is soft.      Tenderness: There is no abdominal tenderness. There is no guarding.   Musculoskeletal:         General: No tenderness. Normal range of motion.      Cervical back: Normal range of motion and neck supple.   Lymphadenopathy:      Cervical: No cervical adenopathy.   Skin:     General: Skin is warm and dry.      Capillary Refill: Capillary refill takes less than 2 seconds.      Coloration: Skin is jaundiced.      Findings: No rash.   Neurological:      Mental Status: She is alert. She is disoriented.      Motor: Weakness: bilateral lower extremities.      Comments: Poor participation   Psychiatric:         Attention and Perception: She perceives visual hallucinations.         Behavior: Behavior is cooperative.         Thought Content: Thought content is delusional.             Significant Labs: All pertinent labs within the past 24 hours have been reviewed.    Significant Imaging: I have reviewed all pertinent imaging results/findings within the past 24 hours.    Assessment/Plan:      * Decompensated hepatic cirrhosis  Patient with known Cirrhosis with Child's class C. Co-morbidities are present and inclusive of ascites, malnutrition, anemia/pancytopenia, and Pleural effusion .  MELD-Na score calculated; MELD 3.0: 32 at 1/10/2024  5:57 AM  MELD-Na: 27  at 1/10/2024  5:57 AM  Calculated from:  Serum Creatinine: 0.5 mg/dL (Using min of 1 mg/dL) at 1/10/2024  5:57 AM  Serum Sodium: 147 mmol/L (Using max of 137 mmol/L) at 1/10/2024  5:57 AM  Total Bilirubin: 18.1 mg/dL at 1/10/2024  5:57 AM  Serum Albumin: 1.8 g/dL at 1/10/2024  5:57 AM  INR(ratio): 2.4 at 1/10/2024  5:57 AM  Age at listing (hypothetical): 58 years  Sex: Female at 1/10/2024  5:57 AM      Continue chronic meds. Etiology likely ETOH. Will avoid any hepatotoxic meds, and monitor CBC/CMP/INR for synthetic function.     Presenting with decompensation (increased ascites, Bili 12, ammonia 114).  Status post thoracentesis of 650 mL.  Awaiting cultures.  Could not find a pocket for paracentesis    -restart lactulose as she has been noncompliant  Appreciate hepatology recommendations.      Recommendations to include physical therapy occupational therapy, palliative care discussions for goals of care.  She is not a candidate for transplantation given frailty, poor social support and noncompliance with medication    Aspiration of food  Event on 1/9.   SLP monitoring and diet changed from minced to pureed 1/10      Acute hypoxic respiratory failure  Patient with Hypoxic Respiratory failure which is Acute on chronic.  she is not on home oxygen. Supplemental oxygen was provided and noted improvement in her O2 sats.    -chest x-ray with right-sided pleural effusion.  Status post thoracentesis and 650 mL of fluid drained.  Awaiting cultures.  Oxygen requirements decreased to 2 L at this time.  Continue to monitor    Now with aspiration PNA. Conitnue zosyn   SLP eval and aspiration precautions.     Presence of IVC filter  Chronic IVC filter as she is at risk for bleeding and had history of DVTs      Goals of care, counseling/discussion  Appreciate palliative care facilitation with goals of care conversation.  Patient is now DNR and other family meeting scheduled for tomorrow.    I also called and spoke to sister  Zaria and she is on board and wants us to continue treating her for encephalopathy and infection and continue psychiatric medications.    Palliative care encounter  Appreciate palliative care recommendations and meeting with family that is her sister.  Family in agreement to make her DNR.  Documentation in place.  Code status updated    Another meeting with sister by palliative. More meetings needed, sister to come her pablo Saturday from [place]      Aspiration pneumonia of right lung  Aspiration even AM of 1/9  Evaluated by SLP, diet changed to minced and now pureed 1/10.   Started Zosyn 1/9        Acute metabolic encephalopathy  2/2 cirrhosis - non compliant with Lactulose, continue lactulose.   Polypharmacy - adjusted medications. Decreased Zyprexa.   SBP could not be ruled out as no pocket for ascites, now has aspiration and R sided on CXR 1/9, escalate Abx to zosyn     Poor nutrition. Added MVI. Ensure adequate nutrition   Severe debility     Seizure history, on keppra BID, asked neurology to weigh in and if EEG needed or if adjustment to keppra dose.     Palliative care facilitating family meetings. Sister to arrive from [place] on saturday          Hypernatremia  Patient has hypernatremia which is controlled. The hypernatremia is due to  Poor PO intake and Lasix . We will aim to correct the sodium by 8-10mEq in 24 hours. We will correct their hypernatremia with Select IV fluids: 5% albumin 12.5gms . The patient's sodium results have been reviewed and are listed below.  Recent Labs   Lab 01/10/24  0557   *     Will check Urine lytes    Bipolar I disorder, most recent episode depressed  Continue home regimen: lithium, olanzapine    Appreciate psychiatry recommendations we will decrease Zyprexa to 2.5mg.      Seizure  History of documented seizures since 2015.  Most recent encounter in June 2023 where she was on 1 g b.i.d. of Keppra.  Continued on Keppra 1 g b.i.d..  Can consider checking an EEG ensure no seizures  if she stays confused and verify with Neurology.  Pending Keppra level      Severe malnutrition  Nutrition consulted. Most recent weight and BMI monitored-     Measurements:  Wt Readings from Last 1 Encounters:   01/10/24 58.1 kg (128 lb 1.4 oz)   Body mass index is 22.69 kg/m².    Patient has been screened and assessed by RD.    Malnutrition Type:  Context: chronic illness  Level: severe    Malnutrition Characteristic Summary:  Subcutaneous Fat (Malnutrition):  (moderate to severe)  Muscle Mass (Malnutrition):  (moderate to severe)  Hand  Strength, Left (Malnutrition): reduced  Hand  Strength, Right (Malnutrition): reduced    Interventions/Recommendations (treatment strategy):  1.) Recommend continuing Low Na diet, fluid per MD. 2.) Recommend Boost Plus (or Boost equivalent) BID to help optimize PRO/Kcal intake (32% of the FR). 3.) Continue to provide folic acid and thiamine- consider adding MVI. RD to monitor PO intake, skin, labs, wt.        Macrocytic anemia  Patient's anemia is currently controlled. Has not received any PRBCs to date. Etiology likely d/t chronic disease due to Chronic liver disease  Current CBC reviewed-   Lab Results   Component Value Date    HGB 8.0 (L) 01/10/2024    HCT 25.9 (L) 01/10/2024     Monitor serial CBC and transfuse if patient becomes hemodynamically unstable, symptomatic or H/H drops below 7.    She is on chronic thiamine and folate.  We will add multivitamin    Ascites  Mild ascites seen on imaging however no pocket seen on attempt for paracentesis on 1/8/24        VTE Risk Mitigation (From admission, onward)           Ordered     Reason for No Pharmacological VTE Prophylaxis  Once        Question:  Reasons:  Answer:  Risk of Bleeding    01/05/24 0317     IP VTE HIGH RISK PATIENT  Once         01/05/24 0317     Place sequential compression device  Until discontinued         01/05/24 0317                    Discharge Planning   BRAYAN: 1/17/2024     Code Status: DNR   Is the  patient medically ready for discharge?: No    Reason for patient still in hospital (select all that apply): Patient trending condition  Discharge Plan A: Return to nursing home, Hospice/home                  Lenka Ortiz MD  Department of Hospital Medicine   [facility] - Transplant Stepdown

## 2024-01-17 NOTE — CONSULTS
Attempted to assess patient on 1/16 and 1/17. Patient off the floor today for Pleurx catheter placement. Skin remains intact. Pt remains high risk for skin injury. Continue pressure injury prevention measures. Pt to dc with hospice. Please notify NIKKI with any new concerns.

## 2024-01-17 NOTE — PLAN OF CARE
[facility]     Department of Hospital Medicine     [address] [place] [address]     [phone] after hours  [phone] fax                                   HOSPICE  ORDERS     Patient Name: Marely Hamilton  YOB: [birth year]    Admit to Hospice:  [facility] + hospice    Diagnoses:  Active Hospital Problems    Diagnosis  POA    *Decompensated hepatic cirrhosis [K72.90, K74.60]  Yes    Aspiration of food [T17.928A, W44.F3XA]  No    ACP (advance care planning) [Z71.89]  Not Applicable    Acute hypoxic respiratory failure [J96.01]  Yes    Presence of IVC filter [Z95.828]  Not Applicable    Goals of care, counseling/discussion [Z71.89]  Not Applicable    Palliative care encounter [Z51.5]  Not Applicable    Aspiration pneumonia of right lung [J69.0]  Yes    Acute metabolic encephalopathy [G93.41]  Yes    Hypernatremia [E87.0]  Yes    Bipolar I disorder, most recent episode depressed [F31.30]  Yes    Severe malnutrition [E43]  Yes    Macrocytic anemia [D53.9]  Yes     6 units PRBC since June 2015      Ascites [R18.8]  Yes      Resolved Hospital Problems    Diagnosis Date Resolved POA    Acute liver failure without hepatic coma [K72.00] 01/06/2024 Yes    Electrolyte abnormality [E87.8] 01/09/2024 Yes    Hypokalemia [E87.6] 01/07/2024 Yes       Hospice Qualifying Diagnoses: Liver cirrhosis       Patient has a life expectancy < 6 months due to these conditions.    Vital Signs: Routine per Hospice Protocol.    Allergies:  Review of patient's allergies indicates:   Allergen Reactions    Sulfa (sulfonamide antibiotics) Rash    Codeine Nausea And Vomiting       Diet:  minced and moist diet low sodium      Activities: activity as tolerated    Nursing: Per Hospice Routine    Future Orders:  Hospice Medical Director may dictate new orders for comfortable care measures & sign death certificate.    Medications:         Comfort Care Medications Only      Current  Discharge Medication List        START taking these medications    Details   thiamine 100 MG tablet Take 1 tablet (100 mg total) by mouth once daily.  Qty: 30 tablet, Refills: 0           CONTINUE these medications which have CHANGED    Details   lactulose (CHRONULAC) 10 gram/15 mL solution Take 45 mLs (30 g total) by mouth 3 (three) times daily.  Qty: 4050 mL, Refills: 0      lithium (LITHOBID) 300 MG CR tablet Take 1 tablet (300 mg total) by mouth every evening.  Qty: 30 tablet, Refills: 6           CONTINUE these medications which have NOT CHANGED    Details   brimonidine-timoloL (COMBIGAN) 0.2-0.5 % Drop Place 1 drop into both eyes 2 (two) times a day.      ferrous sulfate (FEOSOL) 325 mg (65 mg iron) Tab tablet Take 325 mg by mouth once daily.      levetiracetam 500 mg/5 mL (5 mL) Soln Take 10 mLs (1,000 mg total) by mouth 2 (two) times daily.  Qty: 600 mL, Refills: 11      OLANZapine (ZYPREXA) 5 MG tablet Take 1 tablet (5 mg total) by mouth every evening.  Qty: 30 tablet, Refills: 6      pantoprazole (PROTONIX) 40 mg suspension Take 1 packet (40 mg total) by mouth 2 (two) times daily.  Qty: 60 packet, Refills: 11      spironolactone (ALDACTONE) 100 MG tablet Take 100 mg by mouth once daily.                      _________________________________  Lenka Ortiz MD  01/17/2024

## 2024-01-17 NOTE — PLAN OF CARE
Pt AAOx4 throughout the shift. VSS, SpO2>95% on 2L NC. Pt NPO since midnight for drain placement 1/17 with IR. Pt with 3+ edema in BLE. Pt incontinent to bowel/urine---see flowsheet for I/O. Pt turned q2h. Tylenol adminstered once PRN for leg pain. Pt remains free from falls/injury, bed in lowest locked position, room near unit station, POC ongoing.

## 2024-01-17 NOTE — PROCEDURES
VIR procedure note        Pre Op Diagnosis: Recurrent ascites  Post Op Diagnosis: Same     Procedure: Peritoneal Pleurx Catheter placement       Procedure performed by:  Siva Schwartz MD /  Kevin IBARRA     Written Informed Consent Obtained: Yes  Specimen Removed: No  Estimated Blood Loss: Minimal     Findings:   Successful placement of 15.5 Fr Pleurx Catheter in the RLQ peritoneal space.      Patient tolerated procedure well.           Siva Schwartz MD  VIR Fellow

## 2024-01-17 NOTE — PLAN OF CARE
Pt tolerated abd pleurex procedure well, removed 4600cc noted, specimen collected and sent to lab, transfer to mpu,report given at bedside, report called to floor nurse

## 2024-01-18 NOTE — PLAN OF CARE
1015-Pt. To pacu, sleepy but arousable. Vss. Denies pain. Dressing to left foot intact. Nasal trumpet in place. 1018-Nasal trumpet removed. 1045-Discharge instructions reviewed w/pts. Wife. She verbalized understanding. 1105-Pt. States ready for discharge. Vss. Denies pain. Dressing to left foot intact. Post op shoe placed to left foot. Pt discharged via wheelchair to car, accompanied by RN. Pt discharged awake and alert, respirations equal and unlabored, skin warm, dry, and intact. Pt and family members' questions and concerns addressed prior to discharge. Report called to [facility]. Transport set up for 1800. Pt oriented with stable VS on room air. Will d/c with pleurex clamped. Supplies to be sent. IV to be removed prior to d/c.

## 2024-01-18 NOTE — NURSING
Transport here to pick pt up to go to [facility]. Day time nurse gave report to nursing home. IV removed.

## 2024-01-20 LAB — BACTERIA SPEC AEROBE CULT: NO GROWTH

## 2024-01-20 NOTE — DISCHARGE SUMMARY
DISCHARGE SUMMARY  Hospital Medicine    Team: [facility]    Patient Name: Marely Hamilton  YOB: 1965    Admit Date: 1/4/2024    Discharge Date: 1/17/2024    Discharge Attending Physician: Lenka Ortiz    Admitting Resident    Diagnoses:  Active Hospital Problems    Diagnosis  POA    *Decompensated hepatic cirrhosis [K72.90, K74.60]  Yes    Aspiration of food [T17.928A, W44.F3XA]  No    ACP (advance care planning) [Z71.89]  Not Applicable    Acute hypoxic respiratory failure [J96.01]  Yes    Presence of IVC filter [Z95.828]  Not Applicable    Goals of care, counseling/discussion [Z71.89]  Not Applicable    Palliative care encounter [Z51.5]  Not Applicable    Aspiration pneumonia of right lung [J69.0]  Yes    Acute metabolic encephalopathy [G93.41]  Yes    Hypernatremia [E87.0]  Yes    Bipolar I disorder, most recent episode depressed [F31.30]  Yes    Severe malnutrition [E43]  Yes    Macrocytic anemia [D53.9]  Yes     6 units PRBC since June 2015      Ascites [R18.8]  Yes      Resolved Hospital Problems    Diagnosis Date Resolved POA    Acute liver failure without hepatic coma [K72.00] 01/06/2024 Yes    Electrolyte abnormality [E87.8] 01/09/2024 Yes    Hypokalemia [E87.6] 01/07/2024 Yes       Discharged Condition: admit problems have stabilized      Patient seen and examined on date of discharge face to face. Time spent 45 min:     Physical Exam  Vitals reviewed.   Constitutional:       General: She is not in acute distress.     Appearance: She is cachectic. She is ill-appearing.   HENT:      Head: Normocephalic and atraumatic.      Mouth/Throat:      Mouth: Mucous membranes are dry.      Pharynx: No oropharyngeal exudate.      Comments: Desquamation of lips  Eyes:      General: Scleral icterus present.      Extraocular Movements: Extraocular movements intact.   Cardiovascular:      Rate and Rhythm: Normal rate and regular rhythm.      Heart sounds: Normal heart sounds. No murmur  heard.  Pulmonary:      Effort: Pulmonary effort is normal. No respiratory distress.      Breath sounds: No wheezing.   Abdominal:      General: Bowel sounds are normal. There is no distension.      Palpations: Abdomen is soft.      Tenderness: There is no abdominal tenderness. There is no guarding.   Musculoskeletal:         General: No tenderness. Normal range of motion.      Cervical back: Normal range of motion and neck supple.   Lymphadenopathy:      Cervical: No cervical adenopathy.   Skin:     General: Skin is warm and dry.      Capillary Refill: Capillary refill takes less than 2 seconds.      Coloration: Skin is jaundiced.      Findings: No rash.   Neurological:      Mental Status: She is alert. She is disoriented.      Motor: Weakness: bilateral lower extremities.      Comments: Poor participation   Psychiatric:         Attention and Perception: She perceives visual hallucinations.         Behavior: Behavior is cooperative.         Thought Content: Thought content is delusional.               HOSPITAL COURSE:      Initial Presentation:    Patient is a 58-year-old woman with past medical history significant for alcoholic cirrhosis, bipolar disorder, chronic anemia, thrombocytopenia, cholelithiasis, DVT with IVC filter in place, prior GI bleeds, ESBL UTI history, seizure disorder presenting from [facility] for evaluation of abdominal distension, peripheral edema, and jaundice.      Patient denies fever, chills, or abdominal pain. Patient was recently admitted in early December for hepatic encephalopathy.   Patient denies any pain at this time. She reports she has been urinating frequently though difficult to obtain history.   She denies recent illness/fever/chills/nausea/vomiting/diarrhea/constipation.  Patient has not been taking lactulose. Reports compliance with psychiatric medications and A&Ox3 but reports she can't take the lactulose due to not tolerating the sun.     Hospital Course:   In the  ED, vitals stable but hypoxic on room air to the 80s, which improved with supplemental oxygen.  Presentation consistent with decompensated liver failure, meld score 22.    She was sent in by her nursing home facility for mild abdominal distention as well as significant jaundice.    Chest x-ray revealed a moderate-to-large pleural effusion with compressive atelectasis. Likely from her ascites. Her abdomen is soft, nontender.       Intake labs remarkable for Na 142, BUN 6, Cr 0.4, Calcium 7.8, , Tbili 12.1, , ALT 41, Ammonia 114, lactic 1.0.      Will admit for decompensated cirrhosis. Continue on lactulose for treatment of HE, rocephin for SBP ppx. Hepatology and IR consulted for thoracentesis and further recommendations.   Patient is oriented to person, place, time, and situation but with some delusions.     Course of Principle Problem for Admission:    Decompensated hepatic cirrhosis  Patient with known Cirrhosis with Child's class C. Co-morbidities are present and inclusive of ascites, malnutrition, anemia/pancytopenia, and Pleural effusion .  MELD-Na score calculated; MELD 3.0: 32 at 1/10/2024  5:57 AM  MELD-Na: 27 at 1/10/2024  5:57 AM  Calculated from:  Serum Creatinine: 0.5 mg/dL (Using min of 1 mg/dL) at 1/10/2024  5:57 AM  Serum Sodium: 147 mmol/L (Using max of 137 mmol/L) at 1/10/2024  5:57 AM  Total Bilirubin: 18.1 mg/dL at 1/10/2024  5:57 AM  Serum Albumin: 1.8 g/dL at 1/10/2024  5:57 AM  INR(ratio): 2.4 at 1/10/2024  5:57 AM  Age at listing (hypothetical): 58 years  Sex: Female at 1/10/2024  5:57 AM        Continue chronic meds. Etiology likely ETOH. Will avoid any hepatotoxic meds, and monitor CBC/CMP/INR for synthetic function.      Presenting with decompensation (increased ascites, Bili 12, ammonia 114).  Status post thoracentesis of 650 mL.  Could not find a pocket for paracentesis     -restart lactulose as she has been noncompliant  Appreciate hepatology recommendations.       She is  not a candidate for transplantation given frailty, poor social support and noncompliance with medication     Transitioned to hospice care      CONSULTS: hepatology     Last CBC/BMP/HgbA1c (if applicable):  Recent Results (from the past 336 hour(s))   CBC with Automated Differential    Collection Time: 01/17/24  6:26 AM   Result Value Ref Range    WBC 10.67 3.90 - 12.70 K/uL    Hemoglobin 9.4 (L) 12.0 - 16.0 g/dL    Hematocrit 29.4 (L) 37.0 - 48.5 %    Platelets 69 (L) 150 - 450 K/uL   CBC with Automated Differential    Collection Time: 01/16/24  6:15 AM   Result Value Ref Range    WBC 5.17 3.90 - 12.70 K/uL    Hemoglobin 9.4 (L) 12.0 - 16.0 g/dL    Hematocrit 29.4 (L) 37.0 - 48.5 %    Platelets 49 (L) 150 - 450 K/uL   CBC with Automated Differential    Collection Time: 01/15/24  6:11 AM   Result Value Ref Range    WBC 3.87 (L) 3.90 - 12.70 K/uL    Hemoglobin 9.4 (L) 12.0 - 16.0 g/dL    Hematocrit 29.3 (L) 37.0 - 48.5 %    Platelets 45 (L) 150 - 450 K/uL     Recent Results (from the past 336 hour(s))   Basic metabolic panel    Collection Time: 01/10/24  7:47 PM   Result Value Ref Range    Sodium 146 (H) 136 - 145 mmol/L    Potassium 2.9 (L) 3.5 - 5.1 mmol/L    Chloride 116 (H) 95 - 110 mmol/L    CO2 24 23 - 29 mmol/L    BUN 10 6 - 20 mg/dL    Creatinine 0.4 (L) 0.5 - 1.4 mg/dL    Calcium 8.5 (L) 8.7 - 10.5 mg/dL    Anion Gap 6 (L) 8 - 16 mmol/L     Lab Results   Component Value Date    HGBA1C <4.0 (A) 05/29/2023       Disposition:  [facility]    Future Scheduled Appointments:  No future appointments.    Follow-up Plans from This Hospitalization:  [facility]    Discharge Medication List:         Medication List        START taking these medications      thiamine 100 MG tablet  Take 1 tablet (100 mg total) by mouth once daily.            CHANGE how you take these medications      lithium 300 MG CR tablet  Commonly known as: LITHOBID  Take 1 tablet (300 mg total) by mouth every evening.  What changed: how much to take             CONTINUE taking these medications      ALPRAZolam 0.25 MG tablet  Commonly known as: XANAX  Take 1 tablet (0.25 mg total) by mouth 2 (two) times daily as needed for Anxiety.     brimonidine-timoloL 0.2-0.5 % Drop  Commonly known as: COMBIGAN     ferrous sulfate 325 mg (65 mg iron) Tab tablet  Commonly known as: FEOSOL     lactulose 10 gram/15 mL solution  Commonly known as: CHRONULAC  Take 45 mLs (30 g total) by mouth 3 (three) times daily.     levetiracetam 500 mg/5 mL (5 mL) Soln  Take 10 mLs (1,000 mg total) by mouth 2 (two) times daily.     OLANZapine 5 MG tablet  Commonly known as: ZyPREXA  Take 1 tablet (5 mg total) by mouth every evening.     pantoprazole 40 mg suspension  Commonly known as: PROTONIX  Take 1 packet (40 mg total) by mouth 2 (two) times daily.     spironolactone 100 MG tablet  Commonly known as: ALDACTONE            STOP taking these medications      furosemide 20 MG tablet  Commonly known as: LASIX     miconazole nitrate 2% 2 % Oint  Commonly known as: MICOTIN     rifAXIMin 550 mg Tab  Commonly known as: XIFAXAN               Where to Get Your Medications        These medications were sent to [facility] [address], [place] [address]      Hours: Mon-Fri 7a-7p, Sat-Sun 10a-4p Phone: [phone]   thiamine 100 MG tablet       These medications were sent to [facility] [address] [address], [facility] [address]      Phone: [phone]   ALPRAZolam 0.25 MG tablet  lactulose 10 gram/15 mL solution  lithium 300 MG CR tablet  OLANZapine 5 MG tablet         Patient Instructions:  No discharge procedures on file.    Signing Physician:  Lenka Ortiz MD

## 2024-01-22 PROBLEM — T83.511A URINARY TRACT INFECTION ASSOCIATED WITH INDWELLING URETHRAL CATHETER: Status: RESOLVED | Noted: 2023-09-01 | Resolved: 2024-01-22

## 2024-01-22 PROBLEM — N39.0 URINARY TRACT INFECTION ASSOCIATED WITH INDWELLING URETHRAL CATHETER: Status: RESOLVED | Noted: 2023-09-01 | Resolved: 2024-01-22

## 2024-01-24 LAB — BACTERIA SPEC ANAEROBE CULT: NORMAL

## 2024-02-09 LAB — ZINC SERPL-MCNC: <25 UG/DL (ref 60–130)

## 2024-02-23 LAB
ACID FAST MOD KINY STN SPEC: NORMAL
MYCOBACTERIUM SPEC QL CULT: NORMAL

## 2024-02-25 NOTE — ASSESSMENT & PLAN NOTE
Likely secondary to hepatic cirrhosis and development of splenomegaly  Hematology consulted, tested negative for HIT    6/11 --> acute hypotension, hypoxia --> hypovolemic shock --> Large R peritoneal hematoma --> IR for class A embo --> s/p IR embolization of left lumbar and internal iliac branch foci of arterial extravasation    Plts continue to trending down despite transfusion, however, no active bleeding. Will transfuse if plt < 25, or < 50 with active bleeding    PLT given   see MDM

## 2024-04-08 PROBLEM — J96.01 ACUTE HYPOXIC RESPIRATORY FAILURE: Status: RESOLVED | Noted: 2024-01-05 | Resolved: 2024-04-08

## 2024-04-15 PROBLEM — J69.0 ASPIRATION PNEUMONIA OF RIGHT LUNG: Status: RESOLVED | Noted: 2023-08-14 | Resolved: 2024-04-15

## 2024-09-02 NOTE — ASSESSMENT & PLAN NOTE
Ammonia WNL  Continue maintenance lactulose    Follow the doxy w/ a full glass of water each time    You'll get a call with any positive results.     Abstinence while in treatment, partners need to be alerted and treated

## 2024-10-03 NOTE — ED NOTES
Needs repeat pap in 1 year   Resting in bed, eyes closed, rise/fall of chest noted. NAD, DVC maintained for safety, sitter present.

## 2024-11-06 NOTE — ASSESSMENT & PLAN NOTE
Cont spironolactone and lasix  Bili downtrending  INR improving   Have a question or concern?    PRO TIP: Program these phone numbers in your cell phone!    for an emergency  call 911 or go to the nearest hospital Especially after 20 weeks of the pregnancy, please remember that  Labor & Delivery is at [facility].  There is no L&D at [facility] ([facility]   [facility] Nurse Care Advice Line  [phone] At any time during your pregnancy,  you can speak to a nurse 24-7.   for non-urgent issues, send us a  message in [facility] Consider calling the Nurse Care Advice Line if it's a weekend or  toward the end of the work-day since  [facility] and phone messages may not be answered for a day or two.   for non-urgent issues, call the clinic  [phone], Option 3    Labor & Delivery  [phone] Starting at 20 weeks of the pregnancy,  you can speak to a nurse on L&D 24-7.       FIRST TRIMESTER  0 - 13+6 weeks    Adapting to Pregnancy: First Trimester   As your body adjusts, you may have to change or limit your daily activities. You'll need more rest. You may also need to use the energy you have more wisely.     Your Changing Body   Almost every part of your body is affected as you adapt to pregnancy. You may not look very pregnant during the first three months, but you are likely to have some common signs of early pregnancy: Nausea, Fatigue, Frequent urination, Mood swings, Bloating of the abdomen, Nipple or breast tenderness, Breast swelling.    It's Not Too Late to Start Good Habits   What matters most is protecting your baby from this moment on. If you smoke, drink alcohol, or use drugs, now is the time to stop. If you need help, talk with your health care provider.   Smoking increases the risk of miscarriage or having a low-birth-weight baby. If you smoke, quit now.   Alcohol and drugs like marijuana have been linked to miscarriage, birth defects, intellectual disability, and low birth weight. Do not drink alcohol or use drugs.    Tips to Relieve Nausea    There's lots more information in your OB Welcome Packet, but here are a few ideas:  Eat small, light meals at frequent intervals.   Get up slowly. Eat a few unsalted crackers before you get out of bed.   Drink water with lemon slices.   Eat an ice pop in your favorite flavor.   Ask your health care provider about taking cam or vitamin B6 for nausea and vomiting.   Talk with your health care provider if you take vitamins that upset your stomach.    Advice for Travel   Talk to your health care provider first, but the second trimester may be the best time for travel. You may be advised to avoid certain trips while you're pregnant. Food and water can be concerns in developing countries. Travel by car is a good choice since you can stop, get out, and stretch (should be done at least every 2 hours). Bring snacks and water along. Fasten the lap belt below your belly, low over your hips. Also be sure to wear the shoulder harness.   We usually recommend no flying beyond 35 completed weeks (35+6).    Intimacy   Unless your health care provider tells you to, there is no reason to stop having sex while you're pregnant. You or your partner may notice changes in desire. Desire may be less in the first trimester, due to nausea and fatigue. In the second trimester, sex may be very enjoyable. The third trimester can be a challenge comfort-wise. Try different positions and see what's best for you.    Baby!  Major organs and nervous system are developing, the heart starts beating, lungs begin development, bones appear, all the little parts start to form (head, face, eyes, ears, arms, fingers, legs, and toes), hair starts to grow, and 20 tooth-buds develop.      OTHER INFORMATION:  If your provider orders labs or other studies, you probably won't hear from us unless something is abnormal.  How we define normal is different for many things in pregnancy.  This includes several of the numbers on a Complete Blood Count (CBC).  If  your result is flagged as abnormal in [facility] but you don't hear from us, it's probably because things are actually normal based on where you are in your pregnancy.

## 2025-03-19 NOTE — PT/OT/SLP PROGRESS
"Occupational Therapy   Treatment/ Discharge Summary    Name: Marely Hamilton  MRN: [id number]  Admit Date: 2/14/2023  Admitting Diagnosis:  Hepatic encephalopathy    General Precautions: Standard, fall   Orthopedic Precautions: N/A   Braces: N/A    Recommendations:     Discharge Recommendations:  home health OT  Level of Assistance Recommended at Discharge: Intermittent assistance for ADL's and homemaking tasks  Discharge Equipment Recommendations:  (TBD)  Barriers to discharge:  Decreased caregiver support    Assessment:     Marely Hamilton is a 58 y.o. female with a medical diagnosis of Hepatic encephalopathy .  She presents with performance deficits affecting function are weakness, impaired endurance, impaired self care skills, impaired functional mobility, gait instability, impaired balance, decreased upper extremity function, decreased lower extremity function, decreased safety awareness, impaired cardiopulmonary response to activity.     Pt participated well during today's session. Pt tolerated tx session without incident and is making progress, however, continues to demonstrate deficits with self care skills, balance, functional mobility, UB strength and endurance. Pt will benefit from continued OT services to progress towards goals.     Rehab Potential is good    Activity tolerance:  Good    Plan:     Patient to be seen 6 x/week to address the above listed problems via self-care/home management, therapeutic activities, therapeutic exercises    Plan of Care Expires: 03/17/23  Plan of Care Reviewed with: patient    Subjective   "I'm happy I'm going home"  Communicated with: Rolando prior to session.  .    Pain/Comfort:  Pain Rating 1: 0/10  Pain Rating Post-Intervention 1: 0/10    Patient's cultural, spiritual, Mosque conflicts given the current situation:  no    Objective:     Patient found HOB elevated upon OT entry to room.    Bed Mobility:    Patient completed Rolling/Turning to Right with modified " independence  Patient completed Scooting/Bridging with modified independence  Patient completed Supine to Sit with modified independence     Functional Mobility/Transfers:  Patient completed Sit <> Stand Transfer with supervision  with  rolling walker   Functional Mobility: Pt ambulated approx ~ 20 ft in room, from EOB to bathroom x 2 with RW and supervision.     Activities of Daily Living:  Feeding:  set up breakfast. Open containers.   Grooming: supervision to perform oral/facial hygiene in stance at sink with RW.   Upper Body Dressing: modified independence to doff/don pullover shirt.  Lower Body Dressing: modified independence to don B socks and pants..     [facility] 6 Click ADL: 21    Treatment & Education:  Pt educated on role of OT, safety while performing functional transfers and self care tasks, and progress towards OT goals.      Patient left sitting edge of bed with call button in reach    GOALS:   Multidisciplinary Problems       Occupational Therapy Goals          Problem: Occupational Therapy    Goal Priority Disciplines Outcome Interventions   Occupational Therapy Goal     OT, PT/OT Ongoing, Progressing    Description: Goals to be met by: 3/17/2023     Patient will increase functional independence with ADLs by performing:    UE Dressing with [place].--MET  LE Dressing with [place]. -- MET  Grooming while standing at sink with Modified [place]. -- MET  Toileting from toilet with Modified [place] for hygiene and clothing management. -- MET  Bathing from  shower chair/bench with Stand-by Assistance. --MET  Supine to sit with [place].  --MET  Step transfer with Modified [place] with RW. -- MET  Toilet transfer to toilet with Modified [place] with RW. --NOT MET                         Time Tracking:     OT Date of Treatment: 02/28/23  OT Start Time: 0752    OT Stop Time: 0807  OT Total Time (min): 15 min    Billable Minutes:Self Care/Home Management 15    2/28/2023   Negative

## 2025-06-05 NOTE — ASSESSMENT & PLAN NOTE
Acute metabolic encephalopathy  58F with EtOH induced hepatic cirrhosis, seizure disorder on outpatient LEV and ZNS and bipolar disorder admitted on 5/28 for persistent encephalopathy. MRI Brain WO was unremarkable for acute neurologic change. EEG w/ frequent left hemispheric electrographic seizures and NCSE. Treated with levetiracetam 1 g BID, lactulose and rifaximin. Hypercalcemia treated by holding lithium. Proteus mirabilis UTI treated with antibiotic course. Improved to where she no epileptiform discharges on EEG x 24.    Rx refill request for prasugrel 10 mg

## 2025-07-29 NOTE — Clinical Note
Bed: 74  Expected date:   Expected time:   Means of arrival:   Comments:  Hospital bed   Diagnosis: Hepatic encephalopathy [572.2.ICD-9-CM]   Admitting Provider:: MASON MARTINEZ III [[id number]]   Future Attending Provider: MASON MARTINEZ III [[id number]]   Reason for IP Medical Treatment  (Clinical interventions that can only be accomplished in the IP setting? ) :: Encephalopathy   Estimated Length of Stay:: 2 midnights   I certify that Inpatient services for greater than or equal to 2 midnights are medically necessary:: Yes   Plans for Post-Acute care--if anticipated (pick the single best option):: A. No post acute care anticipated at this time   Special Needs:: Disoriented [14]